# Patient Record
Sex: MALE | Race: WHITE | Employment: OTHER | URBAN - METROPOLITAN AREA
[De-identification: names, ages, dates, MRNs, and addresses within clinical notes are randomized per-mention and may not be internally consistent; named-entity substitution may affect disease eponyms.]

---

## 2017-01-04 ENCOUNTER — TELEPHONE (OUTPATIENT)
Dept: FAMILY MEDICINE CLINIC | Age: 47
End: 2017-01-04

## 2017-01-04 PROBLEM — K80.20 CALCULUS OF GALLBLADDER WITHOUT CHOLECYSTITIS: Status: ACTIVE | Noted: 2017-01-04

## 2017-01-04 PROBLEM — K80.50 BILIARY COLIC: Status: ACTIVE | Noted: 2017-01-04

## 2017-01-09 ENCOUNTER — CARE COORDINATION (OUTPATIENT)
Dept: CARE COORDINATION | Age: 47
End: 2017-01-09

## 2017-02-02 ENCOUNTER — TELEPHONE (OUTPATIENT)
Dept: CARDIOLOGY CLINIC | Age: 47
End: 2017-02-02

## 2017-02-08 ENCOUNTER — TELEPHONE (OUTPATIENT)
Dept: PULMONOLOGY | Age: 47
End: 2017-02-08

## 2017-02-09 ENCOUNTER — TELEPHONE (OUTPATIENT)
Dept: CARDIOLOGY CLINIC | Age: 47
End: 2017-02-09

## 2017-02-10 ENCOUNTER — CARE COORDINATION (OUTPATIENT)
Dept: CARE COORDINATION | Age: 47
End: 2017-02-10

## 2017-02-10 ENCOUNTER — TELEPHONE (OUTPATIENT)
Dept: CARDIAC REHAB | Age: 47
End: 2017-02-10

## 2017-02-14 ENCOUNTER — OFFICE VISIT (OUTPATIENT)
Dept: CARDIOLOGY CLINIC | Age: 47
End: 2017-02-14

## 2017-02-14 VITALS
DIASTOLIC BLOOD PRESSURE: 70 MMHG | WEIGHT: 224.2 LBS | SYSTOLIC BLOOD PRESSURE: 162 MMHG | BODY MASS INDEX: 33.98 KG/M2 | HEIGHT: 68 IN | OXYGEN SATURATION: 97 % | HEART RATE: 79 BPM

## 2017-02-14 DIAGNOSIS — J44.9 CHRONIC OBSTRUCTIVE PULMONARY DISEASE, UNSPECIFIED COPD TYPE (HCC): ICD-10-CM

## 2017-02-14 DIAGNOSIS — Z99.89 OSA ON CPAP: ICD-10-CM

## 2017-02-14 DIAGNOSIS — Q23.1 BICUSPID AORTIC VALVE: ICD-10-CM

## 2017-02-14 DIAGNOSIS — E78.2 MIXED HYPERLIPIDEMIA: ICD-10-CM

## 2017-02-14 DIAGNOSIS — I50.42 CHRONIC COMBINED SYSTOLIC AND DIASTOLIC CONGESTIVE HEART FAILURE (HCC): Primary | ICD-10-CM

## 2017-02-14 DIAGNOSIS — I11.0 HYPERTENSIVE HEART DISEASE WITH CONGESTIVE HEART FAILURE WITH PRESERVED LEFT VENTRICULAR FUNCTION (HCC): ICD-10-CM

## 2017-02-14 DIAGNOSIS — N18.4 CKD (CHRONIC KIDNEY DISEASE), STAGE 4 (SEVERE): ICD-10-CM

## 2017-02-14 DIAGNOSIS — I25.119 CORONARY ARTERY DISEASE INVOLVING NATIVE CORONARY ARTERY OF NATIVE HEART WITH ANGINA PECTORIS (HCC): ICD-10-CM

## 2017-02-14 DIAGNOSIS — I50.30 HYPERTENSIVE HEART DISEASE WITH CONGESTIVE HEART FAILURE WITH PRESERVED LEFT VENTRICULAR FUNCTION (HCC): ICD-10-CM

## 2017-02-14 DIAGNOSIS — G47.33 OSA ON CPAP: ICD-10-CM

## 2017-02-14 PROCEDURE — 99213 OFFICE O/P EST LOW 20 MIN: CPT | Performed by: NURSE PRACTITIONER

## 2017-02-16 ENCOUNTER — TELEPHONE (OUTPATIENT)
Dept: FAMILY MEDICINE CLINIC | Age: 47
End: 2017-02-16

## 2017-02-17 ENCOUNTER — TELEPHONE (OUTPATIENT)
Dept: FAMILY MEDICINE CLINIC | Age: 47
End: 2017-02-17

## 2017-02-17 PROBLEM — I16.0 HYPERTENSIVE URGENCY: Status: ACTIVE | Noted: 2017-02-17

## 2017-02-17 PROBLEM — R07.89 CHEST PRESSURE: Status: ACTIVE | Noted: 2017-02-17

## 2017-02-20 ENCOUNTER — CARE COORDINATION (OUTPATIENT)
Dept: CARE COORDINATION | Age: 47
End: 2017-02-20

## 2017-02-22 RX ORDER — DILTIAZEM HYDROCHLORIDE 180 MG/1
CAPSULE, EXTENDED RELEASE ORAL
Qty: 90 CAPSULE | Refills: 1 | Status: SHIPPED | OUTPATIENT
Start: 2017-02-22 | End: 2017-03-21 | Stop reason: SDUPTHER

## 2017-02-22 RX ORDER — IRBESARTAN 300 MG/1
TABLET ORAL
Qty: 90 TABLET | Refills: 1 | Status: SHIPPED | OUTPATIENT
Start: 2017-02-22 | End: 2017-03-21 | Stop reason: ALTCHOICE

## 2017-02-24 ENCOUNTER — TELEPHONE (OUTPATIENT)
Dept: FAMILY MEDICINE CLINIC | Age: 47
End: 2017-02-24

## 2017-02-27 ENCOUNTER — TELEPHONE (OUTPATIENT)
Dept: FAMILY MEDICINE CLINIC | Age: 47
End: 2017-02-27

## 2017-02-28 ENCOUNTER — TELEPHONE (OUTPATIENT)
Dept: FAMILY MEDICINE CLINIC | Age: 47
End: 2017-02-28

## 2017-03-07 ENCOUNTER — OFFICE VISIT (OUTPATIENT)
Dept: FAMILY MEDICINE CLINIC | Age: 47
End: 2017-03-07

## 2017-03-07 DIAGNOSIS — R19.7 DIARRHEA, UNSPECIFIED TYPE: Primary | ICD-10-CM

## 2017-03-07 DIAGNOSIS — N18.4 CRF (CHRONIC RENAL FAILURE), STAGE 4 (SEVERE) (HCC): ICD-10-CM

## 2017-03-07 PROBLEM — N18.9 CRF (CHRONIC RENAL FAILURE): Status: ACTIVE | Noted: 2017-03-07

## 2017-03-07 PROCEDURE — 99214 OFFICE O/P EST MOD 30 MIN: CPT | Performed by: FAMILY MEDICINE

## 2017-03-07 RX ORDER — HYDRALAZINE HYDROCHLORIDE 100 MG/1
100 TABLET, FILM COATED ORAL 3 TIMES DAILY
Qty: 90 TABLET | Refills: 1 | Status: SHIPPED | OUTPATIENT
Start: 2017-03-07 | End: 2017-03-21 | Stop reason: SDUPTHER

## 2017-03-07 RX ORDER — FERROUS SULFATE 325(65) MG
325 TABLET ORAL
Qty: 90 TABLET | Refills: 1 | Status: SHIPPED | OUTPATIENT
Start: 2017-03-07 | End: 2018-03-05 | Stop reason: SDUPTHER

## 2017-03-07 RX ORDER — FUROSEMIDE 40 MG/1
40 TABLET ORAL DAILY
Qty: 90 TABLET | Refills: 1 | Status: SHIPPED | OUTPATIENT
Start: 2017-03-07 | End: 2017-03-21 | Stop reason: SDUPTHER

## 2017-03-07 RX ORDER — GABAPENTIN 300 MG/1
300 CAPSULE ORAL 3 TIMES DAILY
Qty: 270 CAPSULE | Refills: 1 | Status: SHIPPED | OUTPATIENT
Start: 2017-03-07 | End: 2017-04-26 | Stop reason: SDUPTHER

## 2017-03-09 VITALS
HEART RATE: 104 BPM | HEIGHT: 69 IN | SYSTOLIC BLOOD PRESSURE: 140 MMHG | DIASTOLIC BLOOD PRESSURE: 92 MMHG | WEIGHT: 211 LBS | BODY MASS INDEX: 31.25 KG/M2 | OXYGEN SATURATION: 97 %

## 2017-03-09 ASSESSMENT — ENCOUNTER SYMPTOMS
CONSTIPATION: 0
ABDOMINAL PAIN: 1
SHORTNESS OF BREATH: 0
NAUSEA: 1
VOMITING: 0
DIARRHEA: 1
FLATUS: 1
BLOOD IN STOOL: 0
BLOATING: 0
CHEST TIGHTNESS: 0
ABDOMINAL DISTENTION: 0
COUGH: 0

## 2017-03-15 ENCOUNTER — TELEPHONE (OUTPATIENT)
Dept: FAMILY MEDICINE CLINIC | Age: 47
End: 2017-03-15

## 2017-03-21 ENCOUNTER — OFFICE VISIT (OUTPATIENT)
Dept: CARDIOLOGY CLINIC | Age: 47
End: 2017-03-21

## 2017-03-21 VITALS
HEART RATE: 92 BPM | DIASTOLIC BLOOD PRESSURE: 60 MMHG | WEIGHT: 215 LBS | OXYGEN SATURATION: 96 % | HEIGHT: 69 IN | BODY MASS INDEX: 31.84 KG/M2 | SYSTOLIC BLOOD PRESSURE: 100 MMHG

## 2017-03-21 DIAGNOSIS — I50.42 CHRONIC COMBINED SYSTOLIC AND DIASTOLIC CONGESTIVE HEART FAILURE (HCC): Primary | ICD-10-CM

## 2017-03-21 DIAGNOSIS — N18.4 CKD (CHRONIC KIDNEY DISEASE), STAGE 4 (SEVERE): ICD-10-CM

## 2017-03-21 DIAGNOSIS — J44.9 CHRONIC OBSTRUCTIVE PULMONARY DISEASE, UNSPECIFIED COPD TYPE (HCC): ICD-10-CM

## 2017-03-21 DIAGNOSIS — E78.2 MIXED HYPERLIPIDEMIA: ICD-10-CM

## 2017-03-21 DIAGNOSIS — I11.0 HYPERTENSIVE HEART DISEASE WITH CONGESTIVE HEART FAILURE WITH PRESERVED LEFT VENTRICULAR FUNCTION (HCC): ICD-10-CM

## 2017-03-21 DIAGNOSIS — G47.33 OSA ON CPAP: ICD-10-CM

## 2017-03-21 DIAGNOSIS — Z99.89 OSA ON CPAP: ICD-10-CM

## 2017-03-21 DIAGNOSIS — Q23.1 BICUSPID AORTIC VALVE: ICD-10-CM

## 2017-03-21 DIAGNOSIS — I50.30 HYPERTENSIVE HEART DISEASE WITH CONGESTIVE HEART FAILURE WITH PRESERVED LEFT VENTRICULAR FUNCTION (HCC): ICD-10-CM

## 2017-03-21 DIAGNOSIS — I25.119 CORONARY ARTERY DISEASE INVOLVING NATIVE CORONARY ARTERY OF NATIVE HEART WITH ANGINA PECTORIS (HCC): ICD-10-CM

## 2017-03-21 PROCEDURE — 99214 OFFICE O/P EST MOD 30 MIN: CPT | Performed by: NURSE PRACTITIONER

## 2017-03-21 RX ORDER — HYDRALAZINE HYDROCHLORIDE 100 MG/1
100 TABLET, FILM COATED ORAL 3 TIMES DAILY
Qty: 270 TABLET | Refills: 1 | Status: ON HOLD | OUTPATIENT
Start: 2017-03-21 | End: 2017-05-23 | Stop reason: HOSPADM

## 2017-03-21 RX ORDER — FUROSEMIDE 40 MG/1
40 TABLET ORAL DAILY PRN
Qty: 90 TABLET | Refills: 1 | Status: SHIPPED | OUTPATIENT
Start: 2017-03-21 | End: 2017-04-26 | Stop reason: SDUPTHER

## 2017-03-21 RX ORDER — ISOSORBIDE MONONITRATE 60 MG/1
60 TABLET, EXTENDED RELEASE ORAL DAILY
Qty: 90 TABLET | Refills: 1 | Status: SHIPPED | OUTPATIENT
Start: 2017-03-21 | End: 2017-05-05 | Stop reason: SDUPTHER

## 2017-03-21 RX ORDER — CARVEDILOL 25 MG/1
25 TABLET ORAL 2 TIMES DAILY WITH MEALS
Qty: 180 TABLET | Refills: 1 | Status: SHIPPED | OUTPATIENT
Start: 2017-03-21 | End: 2017-04-18 | Stop reason: SDUPTHER

## 2017-03-21 RX ORDER — DILTIAZEM HYDROCHLORIDE 180 MG/1
180 CAPSULE, COATED, EXTENDED RELEASE ORAL DAILY
Qty: 90 CAPSULE | Refills: 1 | Status: ON HOLD | OUTPATIENT
Start: 2017-03-21 | End: 2017-05-23 | Stop reason: HOSPADM

## 2017-03-24 ENCOUNTER — HOSPITAL ENCOUNTER (OUTPATIENT)
Dept: OTHER | Age: 47
Discharge: HOME | End: 2017-03-24
Attending: INTERNAL MEDICINE | Admitting: INTERNAL MEDICINE

## 2017-03-24 LAB
ALBUMIN SERPL-MCNC: 3.5 G/DL (ref 3.4–5)
ANION GAP SERPL CALCULATED.3IONS-SCNC: 13 MMOL/L (ref 3–16)
BACTERIA: ABNORMAL /HPF
BILIRUBIN URINE: NEGATIVE
BLOOD, URINE: NEGATIVE
BUN BLDV-MCNC: 48 MG/DL (ref 7–20)
CALCIUM SERPL-MCNC: 8.9 MG/DL (ref 8.3–10.6)
CHLORIDE BLD-SCNC: 98 MMOL/L (ref 99–110)
CLARITY: CLEAR
CO2: 25 MMOL/L (ref 21–32)
COLOR: YELLOW
CREAT SERPL-MCNC: 2.8 MG/DL (ref 0.9–1.3)
CREATININE URINE: 64.8 MG/DL (ref 39–259)
GFR AFRICAN AMERICAN: 29
GFR NON-AFRICAN AMERICAN: 24
GLUCOSE BLD-MCNC: 178 MG/DL (ref 70–99)
GLUCOSE URINE: NEGATIVE MG/DL
HCT VFR BLD CALC: 35 % (ref 40.5–52.5)
HEMOGLOBIN: 11.7 G/DL (ref 13.5–17.5)
KETONES, URINE: NEGATIVE MG/DL
LEUKOCYTE ESTERASE, URINE: NEGATIVE
MAGNESIUM: 2.4 MG/DL (ref 1.8–2.4)
MCH RBC QN AUTO: 29.8 PG (ref 26–34)
MCHC RBC AUTO-ENTMCNC: 33.3 G/DL (ref 31–36)
MCV RBC AUTO: 89.5 FL (ref 80–100)
MICROALBUMIN UR-MCNC: 97.1 MG/DL
MICROALBUMIN/CREAT UR-RTO: 1498.5 MG/G (ref 0–30)
MICROSCOPIC EXAMINATION: YES
NITRITE, URINE: NEGATIVE
PARATHYROID HORMONE INTACT: 58.8 PG/ML (ref 14–72)
PDW BLD-RTO: 14.6 % (ref 12.4–15.4)
PH UA: 5.5
PHOSPHORUS: 4 MG/DL (ref 2.5–4.9)
PLATELET # BLD: 325 K/UL (ref 135–450)
PMV BLD AUTO: 7.9 FL (ref 5–10.5)
POTASSIUM SERPL-SCNC: 4.3 MMOL/L (ref 3.5–5.1)
PROTEIN PROTEIN: 136 MG/DL
PROTEIN UA: 100 MG/DL
RBC # BLD: 3.91 M/UL (ref 4.2–5.9)
RBC UA: ABNORMAL /HPF (ref 0–2)
SODIUM BLD-SCNC: 136 MMOL/L (ref 136–145)
SPECIFIC GRAVITY UA: 1.01
URIC ACID, SERUM: 7.3 MG/DL (ref 3.5–7.2)
UROBILINOGEN, URINE: 0.2 E.U./DL
WBC # BLD: 11 K/UL (ref 4–11)
WBC UA: ABNORMAL /HPF (ref 0–5)

## 2017-03-29 RX ORDER — ISOSORBIDE MONONITRATE 30 MG/1
TABLET, EXTENDED RELEASE ORAL
Qty: 90 TABLET | Refills: 1 | Status: SHIPPED | OUTPATIENT
Start: 2017-03-29 | End: 2017-05-05 | Stop reason: SDUPTHER

## 2017-04-11 ENCOUNTER — TELEPHONE (OUTPATIENT)
Dept: FAMILY MEDICINE CLINIC | Age: 47
End: 2017-04-11

## 2017-04-18 RX ORDER — FUROSEMIDE 40 MG/1
TABLET ORAL
Qty: 90 TABLET | Refills: 1 | OUTPATIENT
Start: 2017-04-18

## 2017-04-19 RX ORDER — CARVEDILOL 25 MG/1
TABLET ORAL
Qty: 180 TABLET | Refills: 1 | Status: ON HOLD | OUTPATIENT
Start: 2017-04-19 | End: 2017-11-25 | Stop reason: HOSPADM

## 2017-04-19 RX ORDER — CLOPIDOGREL BISULFATE 75 MG/1
TABLET ORAL
Qty: 90 TABLET | Refills: 1 | Status: SHIPPED | OUTPATIENT
Start: 2017-04-19 | End: 2018-04-25 | Stop reason: SDUPTHER

## 2017-04-19 RX ORDER — TRAZODONE HYDROCHLORIDE 150 MG/1
TABLET ORAL
Qty: 90 TABLET | Refills: 1 | Status: SHIPPED | OUTPATIENT
Start: 2017-04-19 | End: 2017-06-02

## 2017-04-19 RX ORDER — GABAPENTIN 300 MG/1
CAPSULE ORAL
Qty: 270 CAPSULE | Refills: 1 | OUTPATIENT
Start: 2017-04-19

## 2017-04-19 RX ORDER — CITALOPRAM 20 MG/1
TABLET ORAL
Qty: 90 TABLET | Refills: 1 | Status: SHIPPED | OUTPATIENT
Start: 2017-04-19 | End: 2018-10-08 | Stop reason: SDUPTHER

## 2017-04-26 RX ORDER — FUROSEMIDE 40 MG/1
TABLET ORAL
Qty: 90 TABLET | Refills: 1 | Status: SHIPPED | OUTPATIENT
Start: 2017-04-26 | End: 2017-10-17 | Stop reason: ALTCHOICE

## 2017-04-26 RX ORDER — GABAPENTIN 300 MG/1
CAPSULE ORAL
Qty: 270 CAPSULE | Refills: 1 | Status: ON HOLD | OUTPATIENT
Start: 2017-04-26 | End: 2017-05-24 | Stop reason: HOSPADM

## 2017-05-05 ENCOUNTER — HOSPITAL ENCOUNTER (OUTPATIENT)
Dept: OTHER | Age: 47
Discharge: HOME | End: 2017-05-05
Attending: INTERNAL MEDICINE | Admitting: INTERNAL MEDICINE

## 2017-05-05 ENCOUNTER — OFFICE VISIT (OUTPATIENT)
Dept: CARDIOLOGY CLINIC | Age: 47
End: 2017-05-05

## 2017-05-05 VITALS
HEART RATE: 66 BPM | WEIGHT: 221 LBS | OXYGEN SATURATION: 93 % | BODY MASS INDEX: 32.73 KG/M2 | HEIGHT: 69 IN | SYSTOLIC BLOOD PRESSURE: 124 MMHG | DIASTOLIC BLOOD PRESSURE: 60 MMHG

## 2017-05-05 DIAGNOSIS — I50.30 HYPERTENSIVE HEART DISEASE WITH CONGESTIVE HEART FAILURE WITH PRESERVED LEFT VENTRICULAR FUNCTION (HCC): ICD-10-CM

## 2017-05-05 DIAGNOSIS — E78.2 MIXED HYPERLIPIDEMIA: ICD-10-CM

## 2017-05-05 DIAGNOSIS — J44.9 CHRONIC OBSTRUCTIVE PULMONARY DISEASE, UNSPECIFIED COPD TYPE (HCC): ICD-10-CM

## 2017-05-05 DIAGNOSIS — N18.4 CKD (CHRONIC KIDNEY DISEASE), STAGE 4 (SEVERE): ICD-10-CM

## 2017-05-05 DIAGNOSIS — I11.0 HYPERTENSIVE HEART DISEASE WITH CONGESTIVE HEART FAILURE WITH PRESERVED LEFT VENTRICULAR FUNCTION (HCC): ICD-10-CM

## 2017-05-05 DIAGNOSIS — I50.42 CHRONIC COMBINED SYSTOLIC AND DIASTOLIC CONGESTIVE HEART FAILURE (HCC): Primary | ICD-10-CM

## 2017-05-05 DIAGNOSIS — Z99.89 OSA ON CPAP: ICD-10-CM

## 2017-05-05 DIAGNOSIS — Q23.1 BICUSPID AORTIC VALVE: ICD-10-CM

## 2017-05-05 DIAGNOSIS — I25.119 CORONARY ARTERY DISEASE INVOLVING NATIVE CORONARY ARTERY OF NATIVE HEART WITH ANGINA PECTORIS (HCC): ICD-10-CM

## 2017-05-05 DIAGNOSIS — G47.33 OSA ON CPAP: ICD-10-CM

## 2017-05-05 LAB
ALBUMIN SERPL-MCNC: 3.5 G/DL (ref 3.4–5)
ANION GAP SERPL CALCULATED.3IONS-SCNC: 17 MMOL/L (ref 3–16)
BUN BLDV-MCNC: 42 MG/DL (ref 7–20)
CALCIUM SERPL-MCNC: 8.5 MG/DL (ref 8.3–10.6)
CHLORIDE BLD-SCNC: 104 MMOL/L (ref 99–110)
CO2: 18 MMOL/L (ref 21–32)
CREAT SERPL-MCNC: 2.5 MG/DL (ref 0.9–1.3)
GFR AFRICAN AMERICAN: 33
GFR NON-AFRICAN AMERICAN: 28
GLUCOSE BLD-MCNC: 208 MG/DL (ref 70–99)
PARATHYROID HORMONE INTACT: 64.4 PG/ML (ref 14–72)
PHOSPHORUS: 3.6 MG/DL (ref 2.5–4.9)
POTASSIUM SERPL-SCNC: 4.5 MMOL/L (ref 3.5–5.1)
SODIUM BLD-SCNC: 139 MMOL/L (ref 136–145)
VITAMIN D 25-HYDROXY: 9.5 NG/ML

## 2017-05-05 PROCEDURE — 99213 OFFICE O/P EST LOW 20 MIN: CPT | Performed by: NURSE PRACTITIONER

## 2017-05-05 RX ORDER — ISOSORBIDE MONONITRATE 60 MG/1
60 TABLET, EXTENDED RELEASE ORAL DAILY
Qty: 90 TABLET | Refills: 3 | Status: ON HOLD | OUTPATIENT
Start: 2017-05-05 | End: 2017-11-25 | Stop reason: HOSPADM

## 2017-05-21 PROBLEM — I63.9 CVA (CEREBRAL VASCULAR ACCIDENT) (HCC): Status: ACTIVE | Noted: 2017-05-21

## 2017-05-21 PROBLEM — Z72.0 TOBACCO ABUSE: Status: ACTIVE | Noted: 2017-05-21

## 2017-05-21 PROBLEM — E11.22 CKD STAGE 4 DUE TO TYPE 2 DIABETES MELLITUS (HCC): Status: ACTIVE | Noted: 2017-05-21

## 2017-05-21 PROBLEM — N18.4 CKD STAGE 4 DUE TO TYPE 2 DIABETES MELLITUS (HCC): Status: ACTIVE | Noted: 2017-05-21

## 2017-05-25 ENCOUNTER — CARE COORDINATION (OUTPATIENT)
Dept: CARE COORDINATION | Age: 47
End: 2017-05-25

## 2017-05-25 RX ORDER — LANCETS 30 GAUGE
EACH MISCELLANEOUS
Qty: 100 EACH | Refills: 3 | Status: ON HOLD | OUTPATIENT
Start: 2017-05-25 | End: 2021-04-27 | Stop reason: HOSPADM

## 2017-05-25 RX ORDER — BLOOD-GLUCOSE METER
1 KIT MISCELLANEOUS DAILY
Qty: 1 KIT | Refills: 0 | Status: SHIPPED | OUTPATIENT
Start: 2017-05-25 | End: 2019-08-19 | Stop reason: SDUPTHER

## 2017-05-26 ENCOUNTER — TELEPHONE (OUTPATIENT)
Dept: CARDIOLOGY CLINIC | Age: 47
End: 2017-05-26

## 2017-05-26 ENCOUNTER — TELEPHONE (OUTPATIENT)
Dept: FAMILY MEDICINE CLINIC | Age: 47
End: 2017-05-26

## 2017-05-30 ENCOUNTER — TELEPHONE (OUTPATIENT)
Dept: FAMILY MEDICINE CLINIC | Age: 47
End: 2017-05-30

## 2017-05-30 ENCOUNTER — TELEPHONE (OUTPATIENT)
Dept: CARDIOLOGY CLINIC | Age: 47
End: 2017-05-30

## 2017-05-31 ENCOUNTER — TELEPHONE (OUTPATIENT)
Dept: FAMILY MEDICINE CLINIC | Age: 47
End: 2017-05-31

## 2017-06-02 ENCOUNTER — OFFICE VISIT (OUTPATIENT)
Dept: FAMILY MEDICINE CLINIC | Age: 47
End: 2017-06-02

## 2017-06-02 VITALS
SYSTOLIC BLOOD PRESSURE: 136 MMHG | BODY MASS INDEX: 32.49 KG/M2 | OXYGEN SATURATION: 97 % | TEMPERATURE: 98.3 F | DIASTOLIC BLOOD PRESSURE: 72 MMHG | WEIGHT: 220 LBS | HEART RATE: 92 BPM

## 2017-06-02 DIAGNOSIS — Z72.0 TOBACCO ABUSE: Chronic | ICD-10-CM

## 2017-06-02 DIAGNOSIS — J44.9 CHRONIC OBSTRUCTIVE PULMONARY DISEASE, UNSPECIFIED COPD TYPE (HCC): ICD-10-CM

## 2017-06-02 DIAGNOSIS — I20.8 STABLE ANGINA (HCC): ICD-10-CM

## 2017-06-02 DIAGNOSIS — E11.43 DIABETIC AUTONOMIC NEUROPATHY ASSOCIATED WITH TYPE 2 DIABETES MELLITUS (HCC): ICD-10-CM

## 2017-06-02 DIAGNOSIS — E11.22 CKD STAGE 4 DUE TO TYPE 2 DIABETES MELLITUS (HCC): ICD-10-CM

## 2017-06-02 DIAGNOSIS — I42.9 CARDIOMYOPATHY (HCC): ICD-10-CM

## 2017-06-02 DIAGNOSIS — N18.4 CHRONIC KIDNEY DISEASE (CKD), STAGE IV (SEVERE) (HCC): ICD-10-CM

## 2017-06-02 DIAGNOSIS — E11.21 DIABETIC NEPHROPATHY ASSOCIATED WITH TYPE 2 DIABETES MELLITUS (HCC): ICD-10-CM

## 2017-06-02 DIAGNOSIS — N18.4 CKD STAGE 4 DUE TO TYPE 2 DIABETES MELLITUS (HCC): ICD-10-CM

## 2017-06-02 DIAGNOSIS — I63.9 CEREBROVASCULAR ACCIDENT (CVA), UNSPECIFIED MECHANISM (HCC): Primary | ICD-10-CM

## 2017-06-02 DIAGNOSIS — I73.9 PERIPHERAL VASCULAR DISEASE (HCC): ICD-10-CM

## 2017-06-02 PROCEDURE — 99214 OFFICE O/P EST MOD 30 MIN: CPT | Performed by: FAMILY MEDICINE

## 2017-06-02 RX ORDER — HYDROXYZINE PAMOATE 50 MG/1
50-100 CAPSULE ORAL NIGHTLY
Qty: 60 CAPSULE | Refills: 0 | Status: SHIPPED | OUTPATIENT
Start: 2017-06-02 | End: 2017-09-19 | Stop reason: SDUPTHER

## 2017-06-02 RX ORDER — VARENICLINE TARTRATE 0.5 MG/1
0.5 TABLET, FILM COATED ORAL DAILY
Qty: 30 TABLET | Refills: 2 | Status: SHIPPED | OUTPATIENT
Start: 2017-06-02 | End: 2017-07-11 | Stop reason: SDUPTHER

## 2017-06-05 ENCOUNTER — TELEPHONE (OUTPATIENT)
Dept: FAMILY MEDICINE CLINIC | Age: 47
End: 2017-06-05

## 2017-06-05 ASSESSMENT — ENCOUNTER SYMPTOMS
BLOOD IN STOOL: 0
ANAL BLEEDING: 0
ABDOMINAL PAIN: 0
EYE DISCHARGE: 0
CHEST TIGHTNESS: 0
COUGH: 0
DIARRHEA: 0
SHORTNESS OF BREATH: 0
ABDOMINAL DISTENTION: 0
CONSTIPATION: 0

## 2017-06-13 ENCOUNTER — TELEPHONE (OUTPATIENT)
Dept: FAMILY MEDICINE CLINIC | Age: 47
End: 2017-06-13

## 2017-06-15 ENCOUNTER — TELEPHONE (OUTPATIENT)
Dept: FAMILY MEDICINE CLINIC | Age: 47
End: 2017-06-15

## 2017-07-03 PROCEDURE — 93272 ECG/REVIEW INTERPRET ONLY: CPT | Performed by: INTERNAL MEDICINE

## 2017-07-11 ENCOUNTER — OFFICE VISIT (OUTPATIENT)
Dept: CARDIOLOGY CLINIC | Age: 47
End: 2017-07-11

## 2017-07-11 VITALS
HEIGHT: 69 IN | BODY MASS INDEX: 32.73 KG/M2 | OXYGEN SATURATION: 96 % | HEART RATE: 68 BPM | DIASTOLIC BLOOD PRESSURE: 78 MMHG | SYSTOLIC BLOOD PRESSURE: 126 MMHG | WEIGHT: 221 LBS

## 2017-07-11 DIAGNOSIS — Q23.1 BICUSPID AORTIC VALVE: ICD-10-CM

## 2017-07-11 DIAGNOSIS — I63.231 ARTERIAL ISCHEMIC STROKE, ICA, RIGHT, ACUTE (HCC): ICD-10-CM

## 2017-07-11 DIAGNOSIS — G47.33 OSA ON CPAP: ICD-10-CM

## 2017-07-11 DIAGNOSIS — Z99.89 OSA ON CPAP: ICD-10-CM

## 2017-07-11 DIAGNOSIS — I73.9 PVD (PERIPHERAL VASCULAR DISEASE) (HCC): ICD-10-CM

## 2017-07-11 DIAGNOSIS — J44.9 CHRONIC OBSTRUCTIVE PULMONARY DISEASE, UNSPECIFIED COPD TYPE (HCC): ICD-10-CM

## 2017-07-11 DIAGNOSIS — I25.119 CORONARY ARTERY DISEASE INVOLVING NATIVE CORONARY ARTERY OF NATIVE HEART WITH ANGINA PECTORIS (HCC): ICD-10-CM

## 2017-07-11 DIAGNOSIS — I11.0 HYPERTENSIVE HEART DISEASE WITH CONGESTIVE HEART FAILURE WITH PRESERVED LEFT VENTRICULAR FUNCTION (HCC): ICD-10-CM

## 2017-07-11 DIAGNOSIS — I50.30 HYPERTENSIVE HEART DISEASE WITH CONGESTIVE HEART FAILURE WITH PRESERVED LEFT VENTRICULAR FUNCTION (HCC): ICD-10-CM

## 2017-07-11 DIAGNOSIS — I25.5 ISCHEMIC CARDIOMYOPATHY: ICD-10-CM

## 2017-07-11 DIAGNOSIS — E78.2 MIXED HYPERLIPIDEMIA: ICD-10-CM

## 2017-07-11 DIAGNOSIS — I50.42 CHRONIC COMBINED SYSTOLIC AND DIASTOLIC CONGESTIVE HEART FAILURE (HCC): Primary | ICD-10-CM

## 2017-07-11 DIAGNOSIS — N18.4 CKD (CHRONIC KIDNEY DISEASE), STAGE 4 (SEVERE): ICD-10-CM

## 2017-07-11 PROCEDURE — 99214 OFFICE O/P EST MOD 30 MIN: CPT | Performed by: NURSE PRACTITIONER

## 2017-07-11 RX ORDER — VARENICLINE TARTRATE 1 MG/1
1 TABLET, FILM COATED ORAL 2 TIMES DAILY
Qty: 60 TABLET | Refills: 3 | Status: ON HOLD | OUTPATIENT
Start: 2017-08-11 | End: 2017-10-15 | Stop reason: HOSPADM

## 2017-07-11 RX ORDER — VARENICLINE TARTRATE 25 MG
KIT ORAL
Qty: 1 EACH | Refills: 0 | Status: ON HOLD | OUTPATIENT
Start: 2017-07-11 | End: 2017-10-15 | Stop reason: HOSPADM

## 2017-07-13 RX ORDER — SENNA AND DOCUSATE SODIUM 50; 8.6 MG/1; MG/1
TABLET, FILM COATED ORAL
Qty: 30 TABLET | Refills: 0 | Status: ON HOLD | OUTPATIENT
Start: 2017-07-13 | End: 2017-11-22

## 2017-07-18 DIAGNOSIS — I63.211 CEREBROVASCULAR ACCIDENT (CVA) DUE TO OCCLUSION OF RIGHT VERTEBRAL ARTERY (HCC): ICD-10-CM

## 2017-07-31 ENCOUNTER — TELEPHONE (OUTPATIENT)
Dept: CARDIOLOGY CLINIC | Age: 47
End: 2017-07-31

## 2017-08-04 PROBLEM — R19.7 DIARRHEA: Status: ACTIVE | Noted: 2017-08-04

## 2017-08-11 RX ORDER — DILTIAZEM HYDROCHLORIDE 180 MG/1
CAPSULE, EXTENDED RELEASE ORAL
Qty: 90 CAPSULE | Refills: 1 | Status: SHIPPED | OUTPATIENT
Start: 2017-08-11 | End: 2018-04-12 | Stop reason: SDUPTHER

## 2017-08-11 RX ORDER — IRBESARTAN 300 MG/1
TABLET ORAL
Qty: 90 TABLET | Refills: 1 | Status: ON HOLD | OUTPATIENT
Start: 2017-08-11 | End: 2017-10-15 | Stop reason: HOSPADM

## 2017-09-06 RX ORDER — GABAPENTIN 300 MG/1
CAPSULE ORAL
Qty: 270 CAPSULE | Refills: 0 | OUTPATIENT
Start: 2017-09-06

## 2017-09-06 RX ORDER — FUROSEMIDE 40 MG/1
TABLET ORAL
Qty: 90 TABLET | Refills: 0 | OUTPATIENT
Start: 2017-09-06

## 2017-09-06 RX ORDER — TRAZODONE HYDROCHLORIDE 150 MG/1
TABLET ORAL
Qty: 90 TABLET | Refills: 0 | OUTPATIENT
Start: 2017-09-06

## 2017-09-06 RX ORDER — CARVEDILOL 25 MG/1
TABLET ORAL
Qty: 180 TABLET | Refills: 0 | OUTPATIENT
Start: 2017-09-06

## 2017-09-06 RX ORDER — CLOPIDOGREL BISULFATE 75 MG/1
TABLET ORAL
Qty: 90 TABLET | Refills: 0 | OUTPATIENT
Start: 2017-09-06

## 2017-09-06 RX ORDER — CITALOPRAM 20 MG/1
TABLET ORAL
Qty: 90 TABLET | Refills: 0 | OUTPATIENT
Start: 2017-09-06

## 2017-09-11 ENCOUNTER — HOSPITAL ENCOUNTER (OUTPATIENT)
Dept: OTHER | Age: 47
Discharge: HOME | End: 2017-09-11
Attending: INTERNAL MEDICINE | Admitting: INTERNAL MEDICINE

## 2017-09-11 LAB
BACTERIA: ABNORMAL /HPF
BILIRUBIN URINE: NEGATIVE
BLOOD, URINE: ABNORMAL
CLARITY: CLEAR
COLOR: YELLOW
CREATININE URINE: 48.2 MG/DL (ref 39–259)
EPITHELIAL CELLS, UA: ABNORMAL /HPF
GLUCOSE URINE: 250 MG/DL
KETONES, URINE: NEGATIVE MG/DL
LEUKOCYTE ESTERASE, URINE: NEGATIVE
MICROALBUMIN UR-MCNC: 433.7 MG/DL
MICROALBUMIN/CREAT UR-RTO: 8997.9 MG/G (ref 0–30)
MICROSCOPIC EXAMINATION: YES
NITRITE, URINE: NEGATIVE
PH UA: 6
PROTEIN PROTEIN: 630 MG/DL
PROTEIN UA: >=300 MG/DL
RBC UA: ABNORMAL /HPF (ref 0–2)
SPECIFIC GRAVITY UA: 1.02
URINE TYPE: ABNORMAL
UROBILINOGEN, URINE: 0.2 E.U./DL
WBC UA: ABNORMAL /HPF (ref 0–5)

## 2017-09-12 ENCOUNTER — TELEPHONE (OUTPATIENT)
Dept: FAMILY MEDICINE CLINIC | Age: 47
End: 2017-09-12

## 2017-09-12 LAB
ALBUMIN SERPL-MCNC: 3.3 G/DL (ref 3.4–5)
ANION GAP SERPL CALCULATED.3IONS-SCNC: 13 MMOL/L (ref 3–16)
BUN BLDV-MCNC: 33 MG/DL (ref 7–20)
CALCIUM SERPL-MCNC: 9.1 MG/DL (ref 8.3–10.6)
CHLORIDE BLD-SCNC: 102 MMOL/L (ref 99–110)
CO2: 22 MMOL/L (ref 21–32)
CREAT SERPL-MCNC: 2.5 MG/DL (ref 0.9–1.3)
GFR AFRICAN AMERICAN: 33
GFR NON-AFRICAN AMERICAN: 28
GLUCOSE BLD-MCNC: 192 MG/DL (ref 70–99)
PHOSPHORUS: 4.2 MG/DL (ref 2.5–4.9)
POTASSIUM SERPL-SCNC: 4.6 MMOL/L (ref 3.5–5.1)
SODIUM BLD-SCNC: 137 MMOL/L (ref 136–145)

## 2017-09-19 RX ORDER — HYDROXYZINE PAMOATE 50 MG/1
CAPSULE ORAL
Qty: 60 CAPSULE | Refills: 0 | Status: SHIPPED | OUTPATIENT
Start: 2017-09-19 | End: 2017-10-18 | Stop reason: SDUPTHER

## 2017-10-11 ENCOUNTER — TELEPHONE (OUTPATIENT)
Dept: FAMILY MEDICINE CLINIC | Age: 47
End: 2017-10-11

## 2017-10-11 NOTE — TELEPHONE ENCOUNTER
Patient called and states that he has been trouble breathing for the last 3 days and he has gained 3 pounds yesterday and today. I let Dr. Sol Hampton know what was going on and he wants the patient to go to the ER. Patient is aware of this.

## 2017-10-14 PROBLEM — N18.5 CHRONIC RENAL FAILURE, STAGE 5 (HCC): Status: ACTIVE | Noted: 2017-03-07

## 2017-10-17 ENCOUNTER — OFFICE VISIT (OUTPATIENT)
Dept: CARDIOLOGY CLINIC | Age: 47
End: 2017-10-17

## 2017-10-17 VITALS
DIASTOLIC BLOOD PRESSURE: 64 MMHG | SYSTOLIC BLOOD PRESSURE: 110 MMHG | HEART RATE: 78 BPM | BODY MASS INDEX: 35.98 KG/M2 | OXYGEN SATURATION: 95 % | WEIGHT: 237.4 LBS | HEIGHT: 68 IN

## 2017-10-17 DIAGNOSIS — J44.9 CHRONIC OBSTRUCTIVE PULMONARY DISEASE, UNSPECIFIED COPD TYPE (HCC): ICD-10-CM

## 2017-10-17 DIAGNOSIS — I25.119 CORONARY ARTERY DISEASE INVOLVING NATIVE CORONARY ARTERY OF NATIVE HEART WITH ANGINA PECTORIS (HCC): ICD-10-CM

## 2017-10-17 DIAGNOSIS — Q23.1 BICUSPID AORTIC VALVE: ICD-10-CM

## 2017-10-17 DIAGNOSIS — I50.42 CHRONIC COMBINED SYSTOLIC AND DIASTOLIC CONGESTIVE HEART FAILURE (HCC): Primary | ICD-10-CM

## 2017-10-17 DIAGNOSIS — Z99.89 OSA ON CPAP: ICD-10-CM

## 2017-10-17 DIAGNOSIS — I11.0 HYPERTENSIVE HEART DISEASE WITH CONGESTIVE HEART FAILURE WITH PRESERVED LEFT VENTRICULAR FUNCTION (HCC): ICD-10-CM

## 2017-10-17 DIAGNOSIS — I63.231 ARTERIAL ISCHEMIC STROKE, ICA, RIGHT, ACUTE (HCC): ICD-10-CM

## 2017-10-17 DIAGNOSIS — N18.5 CHRONIC RENAL FAILURE, STAGE 5 (HCC): ICD-10-CM

## 2017-10-17 DIAGNOSIS — I25.5 ISCHEMIC CARDIOMYOPATHY: ICD-10-CM

## 2017-10-17 DIAGNOSIS — E78.2 MIXED HYPERLIPIDEMIA: ICD-10-CM

## 2017-10-17 DIAGNOSIS — G47.33 OSA ON CPAP: ICD-10-CM

## 2017-10-17 DIAGNOSIS — I73.9 PVD (PERIPHERAL VASCULAR DISEASE) (HCC): ICD-10-CM

## 2017-10-17 DIAGNOSIS — I50.30 HYPERTENSIVE HEART DISEASE WITH CONGESTIVE HEART FAILURE WITH PRESERVED LEFT VENTRICULAR FUNCTION (HCC): ICD-10-CM

## 2017-10-17 PROCEDURE — 1036F TOBACCO NON-USER: CPT | Performed by: NURSE PRACTITIONER

## 2017-10-17 PROCEDURE — G8598 ASA/ANTIPLAT THER USED: HCPCS | Performed by: NURSE PRACTITIONER

## 2017-10-17 PROCEDURE — 3023F SPIROM DOC REV: CPT | Performed by: NURSE PRACTITIONER

## 2017-10-17 PROCEDURE — 99214 OFFICE O/P EST MOD 30 MIN: CPT | Performed by: NURSE PRACTITIONER

## 2017-10-17 PROCEDURE — G8484 FLU IMMUNIZE NO ADMIN: HCPCS | Performed by: NURSE PRACTITIONER

## 2017-10-17 PROCEDURE — G8427 DOCREV CUR MEDS BY ELIG CLIN: HCPCS | Performed by: NURSE PRACTITIONER

## 2017-10-17 PROCEDURE — 1111F DSCHRG MED/CURRENT MED MERGE: CPT | Performed by: NURSE PRACTITIONER

## 2017-10-17 PROCEDURE — 3046F HEMOGLOBIN A1C LEVEL >9.0%: CPT | Performed by: NURSE PRACTITIONER

## 2017-10-17 PROCEDURE — G8926 SPIRO NO PERF OR DOC: HCPCS | Performed by: NURSE PRACTITIONER

## 2017-10-17 PROCEDURE — G8417 CALC BMI ABV UP PARAM F/U: HCPCS | Performed by: NURSE PRACTITIONER

## 2017-10-17 RX ORDER — TORSEMIDE 20 MG/1
20 TABLET ORAL DAILY
Qty: 30 TABLET | Refills: 3 | Status: SHIPPED | OUTPATIENT
Start: 2017-10-17 | End: 2017-10-17 | Stop reason: SDUPTHER

## 2017-10-17 RX ORDER — GABAPENTIN 300 MG/1
300 CAPSULE ORAL 3 TIMES DAILY
Status: ON HOLD | COMMUNITY
End: 2017-11-25 | Stop reason: HOSPADM

## 2017-10-17 RX ORDER — TORSEMIDE 20 MG/1
20 TABLET ORAL DAILY
Qty: 90 TABLET | Refills: 3 | Status: ON HOLD | OUTPATIENT
Start: 2017-10-17 | End: 2017-11-08 | Stop reason: HOSPADM

## 2017-10-17 NOTE — PATIENT INSTRUCTIONS
1. Stop the sodium bicarbonate completely  2. Stop the furosemide (Lasix)  3. Start instead torsemide (Demadex) 20 mg once daily in the AM  4. Make sure you are not taking \"sartan\" - irbesartan or losartan or olmesartan  5.  Follow up with me in 2-3 weeks

## 2017-10-17 NOTE — PROGRESS NOTES
insulin glargine (TOUJEO SOLOSTAR) 300 UNIT/ML injection pen Inject 40 Units into the skin nightly 6/2/17  Yes Soren Mcallister MD   Insulin Syringes, Disposable, U-100 1 ML MISC 1 each by Does not apply route daily 6/2/17  Yes Soren Mcallister MD   glucose blood VI test strips (FREESTYLE LITE) strip Daily As needed. 5/25/17  Yes Soren Mcallister MD   glucose monitoring kit (FREESTYLE) monitoring kit 1 kit by Does not apply route daily 5/25/17  Yes Soren Mcallister MD   Lancets MISC Test daily 5/25/17  Yes Soren Mcallister MD   atorvastatin (LIPITOR) 80 MG tablet Take 1 tablet by mouth daily 5/24/17  Yes Rock Cornejo MD   isosorbide mononitrate (IMDUR) 60 MG extended release tablet Take 1 tablet by mouth daily 5/5/17  Yes Antelmo Murphy NP   furosemide (LASIX) 40 MG tablet TAKE 1 TABLET EVERY DAY 4/26/17  Yes Soren Mcallister MD   clopidogrel (PLAVIX) 75 MG tablet TAKE 1 TABLET EVERY DAY 4/19/17  Yes Soren Mcallister MD   citalopram (CELEXA) 20 MG tablet TAKE 1 TABLET EVERY DAY 4/19/17  Yes Soren Mcallister MD   carvedilol (COREG) 25 MG tablet TAKE 1 TABLET TWICE DAILY WITH MEALS 4/19/17  Yes Soren Mcallister MD   cloNIDine (CATAPRES) 0.1 MG/24HR Place 1 patch onto the skin once a week 3/21/17  Yes Antelmo Murphy NP   ferrous sulfate 325 (65 FE) MG tablet Take 1 tablet by mouth daily (with breakfast) 3/7/17  Yes Soren Mcallister MD   fluticasone (FLONASE) 50 MCG/ACT nasal spray 4 sprays by Nasal route daily (2 SPRAYS UP EACH NOSTRIL)  Patient taking differently: 4 sprays by Nasal route as needed (2 SPRAYS UP EACH NOSTRIL) 10/14/16  Yes Soren Mcallister MD   nitroGLYCERIN (NITROSTAT) 0.4 MG SL tablet Place 1 tablet under the tongue every 5 minutes as needed for Chest pain 5/27/15  Yes Thea Beck MD   calcium carbonate (TUMS) 500 MG chewable tablet Take 1 tablet by mouth 3 times daily as needed for Heartburn.    Yes Historical Provider, MD   aspirin 81 MG chewable tablet Take 1 tablet by mouth daily. 5/14/13  Yes Teodora Delcid MD   insulin lispro (HUMALOG) 100 UNIT/ML injection vial **Low Dose Correction Algorithm**  Glucose:  Dose:     No Insulin  140-199  1 Unit  200-249  2 Units  250-299  3 Units  300-349  4 Units  350-399  5 Units  400 and above 6 Units 5/24/17   Jann Gardner MD   lidocaine (LIDODERM) 5 % Place 1 patch onto the skin daily as needed for Pain 12 hours on, 12 hours off. 5/24/17   Con MD Kendra        Allergies:  Review of patient's allergies indicates no known allergies. Review of Systems:   · Constitutional: there has been no unanticipated weight loss. · Eyes: No vision changes  · ENT: No Headaches, no nasal congestion. No mouth sores or sore throat. · Cardiovascular: Reviewed in HPI  · Respiratory: No cough or wheezing, no sputum production. · Gastrointestinal: No abdominal pain, no constipation or diarrhea  · Genitourinary: No dysuria, trouble voiding, or hematuria. · Musculoskeletal:  No weakness or joint complaints. · Integumentary: No rash or pruritis. · Neurological: No numbness or tingling. No weakness. No tremor. · Psychiatric: No anxiety, no depression. · Endocrine:  No excessive thirst or urination. · Hematologic/Lymphatic: No abnormal bruising or bleeding, blood clots or swollen lymph nodes.       Physical Examination:    Vitals:    10/17/17 1500   BP: 110/64   Pulse: 78   SpO2: 95%   Weight: 237 lb 6.4 oz (107.7 kg)   Height: 5' 8\" (1.727 m)        Constitutional and General Appearance: Warm and dry, no apparent distress, normal coloration  HEENT:  Normocephalic, atraumatic  Respiratory:  · Normal excursion and expansion without use of accessory muscles  · Resp Auscultation: Normal breath sounds without dullness  Cardiovascular:  · The apical impulses not displaced  · Heart tones are crisp and normal  · JVP 10 cm H2O  · Regular rate and rhythm, normal s1S2, 3/6 CAROL, no g/r/c  · Peripheral pulses are symmetrical and full  · There is no clubbing, cyanosis of the extremities.   · 1+ edema, R>L  · Pedal Pulses: 2+ and equal     Abdomen:  · No masses or tenderness  · Liver/Spleen: No Abnormalities Noted  Neurological/Psychiatric:  · Alert and oriented in all spheres  · Moves all extremities well  · Exhibits normal gait balance and coordination  · No abnormalities of mood, affect, memory, mentation, or behavior are noted    Lab Data:  CBC:   Lab Results   Component Value Date    WBC 12.4 10/14/2017    WBC 8.3 10/13/2017    WBC 12.2 10/12/2017    RBC 3.29 10/14/2017    RBC 3.22 10/13/2017    RBC 3.37 10/12/2017    HGB 10.2 10/14/2017    HGB 10.1 10/13/2017    HGB 10.3 10/12/2017    HCT 29.7 10/14/2017    HCT 29.5 10/13/2017    HCT 30.8 10/12/2017    MCV 90.3 10/14/2017    MCV 91.5 10/13/2017    MCV 91.3 10/12/2017    RDW 13.5 10/14/2017    RDW 13.8 10/13/2017    RDW 13.5 10/12/2017     10/14/2017     10/13/2017     10/12/2017     BMP:  Lab Results   Component Value Date     10/15/2017     10/14/2017     10/13/2017    K 5.6 10/15/2017    K 4.9 10/14/2017    K 5.0 10/13/2017    CL 93 10/15/2017    CL 97 10/14/2017    CL 99 10/13/2017    CO2 22 10/15/2017    CO2 21 10/14/2017    CO2 18 10/13/2017    PHOS 5.2 10/14/2017    PHOS 6.2 10/13/2017    PHOS 4.6 10/12/2017    BUN 53 10/15/2017    BUN 57 10/14/2017    BUN 55 10/13/2017    CREATININE 3.0 10/15/2017    CREATININE 3.1 10/14/2017    CREATININE 3.1 10/13/2017     BNP:   Lab Results   Component Value Date    PROBNP 22,037 10/11/2017    PROBNP 8,232 02/18/2017    PROBNP 16,037 02/17/2017     Troponin: No components found for: TROPONIN  Lipids:   Lab Results   Component Value Date    TRIG 107 05/22/2017    TRIG 118 01/22/2017    HDL 36 05/22/2017    HDL 40 01/22/2017    LDLCALC 72 05/22/2017    LDLCALC 130 01/22/2017    LDLDIRECT 199 09/04/2014    LDLDIRECT 172 03/07/2014     Cardiac Imaging:  Echo 1/24/2017:  Summary   Left ventricular systolic function is normal with an ejection fraction of 55-60%. No wall motion abnormalities noted. Mild concentric left ventricular hypertrophy. Grade II diastolic dysfunction with elevated filling pressure. Mildly dilated left atrium. Moderate aortic stenosis. Mild aortic regurgitation. Compared to previous study from 10/27/2016, aortic stenosis has not   progressed. R/LHC 1/23/2017:  LM <20%  LAD 30% w/ patent stent  Cx 20-30%  RCA 20-30%  LVEDP 19  RA 8, RV 41/10, PA 45/14/31, W 15      Nonobstructive CAD  Mild elevated right heart pressures      Echo Oct 2016:   Normal left ventricle size with mild concentric left ventricular hypertrophy.   Normal systolic function with an estimated ejection fraction of 55%.   No regional wall motion abnormalities are seen.   Elevated left ventricular diastolic filling pressure ( E/e' 21 ).  The aortic valve is thickened/calcified with decreased leaflet mobility. Cannot exclude a bicuspid valve.  The aortic valve area is calculated at 1.0 cm2 with a max velocity of 3.36 m/sec, maximum pressure gradient of 45 mmHg and a mean pressure gradient of 23 mmHg. This is c/w moderate aortic stenosis. Color flow demonstrates eccentric jet suggesting mild aortic regurgitation.   Trace mitral and tricuspid regurgitation. Systolic pulmonary artery pressure (SPAP) is normal and estimated at 22 mmHg (RA pressure 3 mmHg).   There is mild concentric left ventricular hypertrophy. MPI 6/18/15: There is a very small and mild fixed posterior-basal defect consistent with  fibrosis. There is no evidence for ischemia. Left ventricular function is reduced with and estimated LVEF of 40%. The LV is severely dilated. CATH: 5/26/15, Dr. Fili Hameed  LM <20%   LAD 70% mid.  FFR 0.77  D1 50% prox  Cx 20%  RCA 20%  LVEDP 22mmHg  RA 9, RV 42/10, PA 44/16/31, W 18  PA sat 63%    PTCA LAD  3.0 x 15 PATRICIA  prox portion post 3.5 x 8 NC balloon    DAPT, statin  Resume lasix and ARB at discharge provided Cr stable  Renal fxn will not tolerate higher dose of lasix than 40 daily  Needs smoking cessation, weight loss, exercise  Rec OP sleep eval       Echo 3/26/15:   Left ventricle size is normal.  Mild c LVH. EF 55 % to 60 %. No regional wall motion abnormalities are noted. Normal diastolic filling pattern for age. Mild mitral regurgitation is present. Mildly dilated left atrium by increased left atrial volume. Aortic valve is mildly thickened and calcified. The aortic valve area is calculated at 1.4 cm2 with a maximum gradient of 32 mmHg and a mean gradient of 18 mmHg. This is c/w mild aortic stenosis. Trace aortic regurgitation. Trivial tricuspid regurgitation. Systolic pulmonary artery pressure (SPAP) is normal and estimated at 14 mmHg (RA pressure 3 mm Hg). Echo 09/05/2014:    Normal left ventricle size with mild concentric left ventricular hypertrophy. Normal systolic function with an estimated ejection fraction of 55%. No regional wall motion abnormalities are seen. Diastolic filling parameters suggests grade I diastolic dysfunction. Mitral annular calcification is present. The right coronary cusp of the aortic valve appears appears calcified and thickened with decreased leaflet mobility. A bicuspid aortic valve cannot be excluded. The maximal transaortic velocity is 2.48m/s which gives peak pressure gradient= 25mmHg and mean pressure gradient= 14mmHg which is c/w mild aortic stenosis. Color flow demonstrates eccentric jet suggesting mild aorticregurgitation. Cardiac cath May 2013:  1. Mild non-obstructive CAD  2. Reduced LVEF at 40%  3. Mildly elevated LVEDP at 19    MPI Sept 2013: No definite or large zone of reversible ischemia. Small inferoapical defect on stress and rest may represent normal apical thinning or minimal fibrosis. Assessment:    1. Chronic combined systolic and diastolic congestive heart failure ([address]    2.  Coronary artery disease involving native coronary artery

## 2017-10-19 RX ORDER — HYDROXYZINE PAMOATE 50 MG/1
CAPSULE ORAL
Qty: 60 CAPSULE | Refills: 2 | Status: SHIPPED | OUTPATIENT
Start: 2017-10-19 | End: 2018-03-05 | Stop reason: SDUPTHER

## 2017-10-26 RX ORDER — GABAPENTIN 300 MG/1
CAPSULE ORAL
Qty: 270 CAPSULE | Refills: 1 | Status: SHIPPED | OUTPATIENT
Start: 2017-10-26 | End: 2017-11-08 | Stop reason: HOSPADM

## 2017-11-06 RX ORDER — ALBUTEROL SULFATE 90 UG/1
2 AEROSOL, METERED RESPIRATORY (INHALATION) EVERY 6 HOURS PRN
Qty: 1 INHALER | Refills: 5 | Status: ON HOLD | OUTPATIENT
Start: 2017-11-06 | End: 2018-03-24 | Stop reason: HOSPADM

## 2017-11-09 ENCOUNTER — TELEPHONE (OUTPATIENT)
Dept: CARDIAC REHAB | Age: 47
End: 2017-11-09

## 2017-11-10 ENCOUNTER — TELEPHONE (OUTPATIENT)
Dept: PULMONOLOGY | Age: 47
End: 2017-11-10

## 2017-11-10 NOTE — TELEPHONE ENCOUNTER
----- Message from Johnna Aguirre MD sent at 11/8/2017  1:03 PM EST -----  Follow up with me in 1 month.  DIGNA    AN

## 2017-11-13 NOTE — TELEPHONE ENCOUNTER
Can we reschedule Brady Holman from Tuesday to Monday 11/20/17 at 1:45? He has dialysis on Tuesday, Thursday and Saturday now.     Thanks, [employer]

## 2017-11-22 PROBLEM — Z99.2 ESRD (END STAGE RENAL DISEASE) ON DIALYSIS (HCC): Status: ACTIVE | Noted: 2017-11-22

## 2017-11-22 PROBLEM — M79.602 LEFT ARM PAIN: Status: ACTIVE | Noted: 2017-11-22

## 2017-11-22 PROBLEM — R42 DIZZINESS: Status: ACTIVE | Noted: 2017-11-22

## 2017-11-22 PROBLEM — I95.3 HEMODIALYSIS-ASSOCIATED HYPOTENSION: Status: ACTIVE | Noted: 2017-11-22

## 2017-11-22 PROBLEM — N18.6 ESRD (END STAGE RENAL DISEASE) ON DIALYSIS (HCC): Status: ACTIVE | Noted: 2017-11-22

## 2017-11-25 PROBLEM — I95.2 HYPOTENSION DUE TO DRUGS: Status: ACTIVE | Noted: 2017-11-25

## 2017-11-27 ENCOUNTER — TELEPHONE (OUTPATIENT)
Dept: MEDSURG UNIT | Age: 47
End: 2017-11-27

## 2017-11-27 NOTE — TELEPHONE ENCOUNTER
[address]    SYMPTOM ASSESSMENT: Post discharge follow-up phone call made to patient. Patient denies shortness of breath, chest pain, edema, or difficulty sleeping; stated he has been feeling \"okay\" since discharge. Patient's weight on his home scale immediately following discharge was 221 lbs; patient states his weight yesterday was 222 lbs; patient states he has not yet weighed himself today. Reviewed daily weights and call parameters; encouraged patient to call his nephrologist if his weight continues to increase. Patient states he has had significant urine output since discharge. Denies any acute complaints at the time of the call. MEDICATION REVIEW: Patient was able to fill all prescriptions and states he has been taking medications as prescribed. Reviewed patient medications; no discrepancies identified. FOLLOW-UP APPOINTMENT: Reminded patient of his appointment with Soren Mcallister MD scheduled for tomorrow, November 28 at 10:40 AM. Patient states he is \"working on transportation\" to his appointment. EDUCATION: Educated patient on sodium restriction of < 2,000 mg and fluid restriction of < 2,000 ml, daily weights, follow-up, and medication compliance. Stressed the importance of strictly following sodium and fluid restriction especially now since patient is no longer on dialysis. Patient states he has been \"going out of my way\" to avoid sodium, no longer eats processed foods and is avoiding restaurant meals. Patient states he has been preparing food based on the [address]. Reviewed recommended level of activity. Notified patient to call the doctor post discharge if he experiences shortness of breath, chest pain, swelling, cough, or weight gain or loss of 2-3 pounds in a day/five pounds in a week. Also notified patient to call the doctor if he feels dizzy, increased fatigue, decreased or difficulty urinating.     Pt verbalized understanding; stated he will call the doctor with any questions or signs of symptom worsening. No additional questions at this time. HF resource number made available for non-urgent questions.

## 2017-12-01 ENCOUNTER — OFFICE VISIT (OUTPATIENT)
Dept: FAMILY MEDICINE CLINIC | Age: 47
End: 2017-12-01

## 2017-12-01 VITALS
OXYGEN SATURATION: 98 % | HEART RATE: 84 BPM | BODY MASS INDEX: 32.05 KG/M2 | SYSTOLIC BLOOD PRESSURE: 120 MMHG | DIASTOLIC BLOOD PRESSURE: 76 MMHG | WEIGHT: 217 LBS

## 2017-12-01 DIAGNOSIS — E86.1 HYPOVOLEMIA: ICD-10-CM

## 2017-12-01 DIAGNOSIS — N18.6 END STAGE RENAL DISEASE (HCC): Primary | ICD-10-CM

## 2017-12-01 DIAGNOSIS — R55 SYNCOPE, UNSPECIFIED SYNCOPE TYPE: ICD-10-CM

## 2017-12-01 PROCEDURE — 1111F DSCHRG MED/CURRENT MED MERGE: CPT | Performed by: FAMILY MEDICINE

## 2017-12-01 PROCEDURE — 99496 TRANSJ CARE MGMT HIGH F2F 7D: CPT | Performed by: FAMILY MEDICINE

## 2017-12-01 RX ORDER — ZOLPIDEM TARTRATE 5 MG/1
5 TABLET ORAL NIGHTLY PRN
Qty: 30 TABLET | Refills: 0 | Status: SHIPPED | OUTPATIENT
Start: 2017-12-01 | End: 2018-03-05

## 2017-12-03 NOTE — PROGRESS NOTES
Post-Discharge Transitional Care Management Services      Michael Moralez   YOB: 1970    Date of Office Visit:  12/1/2017  Date of Hospital Admission: 11/24/17  Date of Hospital Discharge: 11/25/17  [facility] Risk Score [risk of hospital readmission >=10  medium risk (chance of readmission ~ 12%) >14  high risk (chance of readmission ~18%)]:Risk Score: 23.75    Care management risk score Rising risk (score 2-5) and Complex Care (Scores >=6): No Risk Score On File       Patient Active Problem List   Diagnosis    Acute respiratory failure with hypoxia and hypercapnia (HCC)    Chronic dCHF (grade 2 LVDD)    Chronic obstructive pulmonary disease ([address]    PVD (peripheral vascular disease) ([address]    Cardiomyopathy ([address]    Sleep apnea    Bicuspid aortic valve    Bilateral hilar adenopathy syndrome    Claudication in peripheral vascular disease ([address]    PVD (peripheral vascular disease) ([address]    Essential hypertension    Diabetic neuropathy (HCC)    Type 2 diabetes, uncontrolled, with neuropathy ([address]    Passive smoke exposure    Depression with anxiety    Pneumonia of right upper lobe due to infectious organism ([address]    DM (diabetes mellitus) ([address]    Hypertensive heart disease with congestive heart failure with preserved left ventricular function ([address]    CHF exacerbation ([address]    Chest pain    Coronary artery disease involving native coronary artery of native heart without angina pectoris    Obesity (BMI 30-39. 9)    ZAINAB on CPAP    Degeneration of lumbar or lumbosacral intervertebral disc    Lumbar radiculopathy    Lumbosacral spondylosis without myelopathy    Biliary colic    Symptomatic cholelithiasis    Pre-operative cardiovascular examination    Gastroparesis due to DM (HCC)    Elevated troponin    Angina, class IV (HCC)    Dyspnea    Elevated brain natriuretic peptide (BNP) level    Dyslipidemia    Non morbid obesity due to excess calories    Tobacco smoking 75 MG tablet  TAKE 1 TABLET EVERY DAY             ferrous sulfate 325 (65 FE) MG tablet  Take 1 tablet by mouth daily (with breakfast)             fluticasone (FLONASE) 50 MCG/ACT nasal spray  4 sprays by Nasal route daily (2 SPRAYS UP EACH NOSTRIL)             gabapentin (NEURONTIN) 100 MG capsule  Take 2 capsules by mouth 3 times daily             glucose blood VI test strips (FREESTYLE LITE) strip  Daily As needed. glucose monitoring kit (FREESTYLE) monitoring kit  1 kit by Does not apply route daily             HYDROcodone-acetaminophen (NORCO)  MG per tablet  TK 1 T PO  Q 8 H PRN             hydrOXYzine (VISTARIL) 50 MG capsule  TAKE 1 TO 2 CAPSULES BY MOUTH EVERY NIGHT             Insulin Syringes, Disposable, U-100 1 ML MISC  1 each by Does not apply route daily             ipratropium-albuterol (DUONEB) 0.5-2.5 (3) MG/3ML SOLN nebulizer solution  Inhale 3 mLs into the lungs every 6 hours as needed for Shortness of Breath             isosorbide mononitrate (IMDUR) 30 MG extended release tablet  Take 1 tablet by mouth daily             Lancets MISC  Test daily             nitroGLYCERIN (NITROSTAT) 0.4 MG SL tablet  Place 1 tablet under the tongue every 5 minutes as needed for Chest pain             torsemide (DEMADEX) 100 MG tablet  Take 1 tablet by mouth daily             traZODone (DESYREL) 150 MG tablet  Take 150 mg by mouth nightly             zolpidem (AMBIEN) 5 MG tablet  Take 1 tablet by mouth nightly as needed for Sleep . Medications marked \"taking\" at this time  Outpatient Prescriptions Marked as Taking for the 12/1/17 encounter (Office Visit) with Jaylyn Garner MD   Medication Sig Dispense Refill    zolpidem (AMBIEN) 5 MG tablet Take 1 tablet by mouth nightly as needed for Sleep .  30 tablet 0    isosorbide mononitrate (IMDUR) 30 MG extended release tablet Take 1 tablet by mouth daily 30 tablet 3    gabapentin (NEURONTIN) 100 MG capsule Take 2 capsules by mouth 3 times daily 90 capsule 3    carvedilol (COREG) 12.5 MG tablet Take 1 tablet by mouth 2 times daily (with meals) 60 tablet 3    HYDROcodone-acetaminophen (NORCO)  MG per tablet TK 1 T PO  Q 8 H PRN  0    torsemide (DEMADEX) 100 MG tablet Take 1 tablet by mouth daily 30 tablet 3    hydrOXYzine (VISTARIL) 50 MG capsule TAKE 1 TO 2 CAPSULES BY MOUTH EVERY NIGHT 60 capsule 2    ipratropium-albuterol (DUONEB) 0.5-2.5 (3) MG/3ML SOLN nebulizer solution Inhale 3 mLs into the lungs every 6 hours as needed for Shortness of Breath 360 mL 1    traZODone (DESYREL) 150 MG tablet Take 150 mg by mouth nightly      CARTIA  MG extended release capsule TAKE 1 CAPSULE EVERY DAY 90 capsule 1    Insulin Syringes, Disposable, U-100 1 ML MISC 1 each by Does not apply route daily 100 each 0    glucose blood VI test strips (FREESTYLE LITE) strip Daily As needed. 100 strip 3    glucose monitoring kit (FREESTYLE) monitoring kit 1 kit by Does not apply route daily 1 kit 0    Lancets MISC Test daily 100 each 3    atorvastatin (LIPITOR) 80 MG tablet Take 1 tablet by mouth daily 30 tablet 3    clopidogrel (PLAVIX) 75 MG tablet TAKE 1 TABLET EVERY DAY 90 tablet 1    citalopram (CELEXA) 20 MG tablet TAKE 1 TABLET EVERY DAY 90 tablet 1    ferrous sulfate 325 (65 FE) MG tablet Take 1 tablet by mouth daily (with breakfast) 90 tablet 1    fluticasone (FLONASE) 50 MCG/ACT nasal spray 4 sprays by Nasal route daily (2 SPRAYS UP EACH NOSTRIL) (Patient taking differently: 4 sprays by Nasal route as needed (2 SPRAYS UP EACH NOSTRIL)) 1 Bottle 3    nitroGLYCERIN (NITROSTAT) 0.4 MG SL tablet Place 1 tablet under the tongue every 5 minutes as needed for Chest pain 25 tablet 0    calcium carbonate (TUMS) 500 MG chewable tablet Take 1 tablet by mouth 3 times daily as needed for Heartburn.  aspirin 81 MG chewable tablet Take 1 tablet by mouth daily.  30 tablet 2        Medications patient taking as of now reconciled against medications ordered at time of hospital discharge     Vitals:    12/01/17 1533   BP: 120/76   Site: Right Arm   Position: Sitting   Pulse: 84   SpO2: 98%   Weight: 217 lb (98.4 kg)     Body mass index is 32.05 kg/m². Wt Readings from Last 3 Encounters:   12/01/17 217 lb (98.4 kg)   11/25/17 218 lb 9.6 oz (99.2 kg)   11/08/17 235 lb 3.7 oz (106.7 kg)     BP Readings from Last 3 Encounters:   12/01/17 120/76   11/25/17 137/65   11/08/17 120/64        Inpatient course: Discharge summary reviewed- see chart. Chief Complaint   Patient [place]   [address] [place] [facility]     Seen 11-24-17. HPI  Review of Systems    Non face to face  following discharge, date last encounter closed (first attempt may have been earlier): *No documented post hospital discharge outreach found in the last 14 days *No documented post hospital discharge outreach found in the last 14 days   There is a phone call from of [facility] nurse documenting this. See note on 11-27-17    Call initiated 2 business days of discharge: Yes    Interval history/Current status: Improved      Physical Exam        Assessment/Plan:  Karlos Nieto was seen today for follow-up from hospital.    Diagnoses and all orders for this visit:    End stage renal disease ([facility]  -     MI DISCHARGE MEDS RECONCILED W/ CURRENT OUTPATIENT MED LIST    Hypovolemia  -     MI DISCHARGE MEDS RECONCILED W/ CURRENT OUTPATIENT MED LIST    Syncope, unspecified syncope type  -     MI DISCHARGE MEDS RECONCILED W/ CURRENT OUTPATIENT MED LIST    Other orders  -     zolpidem (AMBIEN) 5 MG tablet; Take 1 tablet by mouth nightly as needed for Sleep . The patient has had multiple episodes of syncope after hemodialysis. Dr. Rubio is holding his dialysis for now. He is getting follow-up blood work on Monday. His creatinine has been holding stable at 4. Patient is still urinating on his own.     Medical Decision Making: high complexity

## 2018-01-05 RX ORDER — ISOSORBIDE MONONITRATE 30 MG/1
30 TABLET, EXTENDED RELEASE ORAL DAILY
Qty: 90 TABLET | Refills: 1 | Status: SHIPPED | OUTPATIENT
Start: 2018-01-05 | End: 2018-06-19 | Stop reason: ALTCHOICE

## 2018-01-05 NOTE — TELEPHONE ENCOUNTER
Last ov-10/17/17 with DD  Next ov- not scheduled  Last labs-11/24/17 Renal, glucose, CBC,BMP  Last EKG-11/22/17

## 2018-01-12 ENCOUNTER — HOSPITAL ENCOUNTER (OUTPATIENT)
Dept: OTHER | Age: 48
Discharge: HOME | End: 2018-01-12
Attending: INTERNAL MEDICINE | Admitting: INTERNAL MEDICINE

## 2018-01-12 ENCOUNTER — HOSPITAL ENCOUNTER (OUTPATIENT)
Dept: OTHER | Age: 48
Discharge: HOME | End: 2018-01-12
Attending: NURSE PRACTITIONER | Admitting: NURSE PRACTITIONER

## 2018-01-12 DIAGNOSIS — M51.26 DISPLACEMENT OF LUMBAR INTERVERTEBRAL DISC WITHOUT MYELOPATHY: ICD-10-CM

## 2018-01-12 LAB
ALBUMIN SERPL-MCNC: 3.7 G/DL (ref 3.4–5)
ANION GAP SERPL CALCULATED.3IONS-SCNC: 16 MMOL/L (ref 3–16)
BACTERIA: ABNORMAL /HPF
BILIRUBIN URINE: NEGATIVE
BLOOD, URINE: NEGATIVE
BUN BLDV-MCNC: 58 MG/DL (ref 7–20)
CALCIUM SERPL-MCNC: 9.6 MG/DL (ref 8.3–10.6)
CASTS 2: ABNORMAL /LPF
CASTS: ABNORMAL /LPF
CHLORIDE BLD-SCNC: 97 MMOL/L (ref 99–110)
CLARITY: CLEAR
CO2: 27 MMOL/L (ref 21–32)
COLOR: YELLOW
CREAT SERPL-MCNC: 3.8 MG/DL (ref 0.9–1.3)
CREATININE URINE: 93.3 MG/DL (ref 39–259)
GFR AFRICAN AMERICAN: 21
GFR NON-AFRICAN AMERICAN: 17
GLUCOSE BLD-MCNC: 200 MG/DL (ref 70–99)
GLUCOSE URINE: 100 MG/DL
HCT VFR BLD CALC: 35.8 % (ref 40.5–52.5)
HEMOGLOBIN: 12 G/DL (ref 13.5–17.5)
KETONES, URINE: NEGATIVE MG/DL
LEUKOCYTE ESTERASE, URINE: NEGATIVE
MAGNESIUM: 2.3 MG/DL (ref 1.8–2.4)
MCH RBC QN AUTO: 30.5 PG (ref 26–34)
MCHC RBC AUTO-ENTMCNC: 33.6 G/DL (ref 31–36)
MCV RBC AUTO: 90.8 FL (ref 80–100)
MICROALBUMIN UR-MCNC: 389.9 MG/DL
MICROALBUMIN/CREAT UR-RTO: 4179 MG/G (ref 0–30)
MICROSCOPIC EXAMINATION: YES
NITRITE, URINE: NEGATIVE
PARATHYROID HORMONE INTACT: 123 PG/ML (ref 14–72)
PDW BLD-RTO: 13.6 % (ref 12.4–15.4)
PH UA: 6
PHOSPHORUS: 4.5 MG/DL (ref 2.5–4.9)
PLATELET # BLD: 298 K/UL (ref 135–450)
PMV BLD AUTO: 7.9 FL (ref 5–10.5)
POTASSIUM SERPL-SCNC: 4.7 MMOL/L (ref 3.5–5.1)
PROTEIN PROTEIN: 575 MG/DL
PROTEIN UA: >=300 MG/DL
PROTEIN/CREAT RATIO: 6.2 MG/DL
RBC # BLD: 3.94 M/UL (ref 4.2–5.9)
RBC UA: ABNORMAL /HPF (ref 0–2)
SODIUM BLD-SCNC: 140 MMOL/L (ref 136–145)
SPECIFIC GRAVITY UA: 1.02
URIC ACID, SERUM: 7 MG/DL (ref 3.5–7.2)
UROBILINOGEN, URINE: 0.2 E.U./DL
WBC # BLD: 13.3 K/UL (ref 4–11)
WBC UA: ABNORMAL /HPF (ref 0–5)

## 2018-01-19 ENCOUNTER — OFFICE VISIT (OUTPATIENT)
Dept: FAMILY MEDICINE CLINIC | Age: 48
End: 2018-01-19

## 2018-01-19 VITALS
WEIGHT: 217 LBS | TEMPERATURE: 98.3 F | HEIGHT: 69 IN | OXYGEN SATURATION: 100 % | DIASTOLIC BLOOD PRESSURE: 77 MMHG | HEART RATE: 83 BPM | RESPIRATION RATE: 20 BRPM | SYSTOLIC BLOOD PRESSURE: 131 MMHG | BODY MASS INDEX: 32.14 KG/M2

## 2018-01-19 DIAGNOSIS — J44.1 COPD EXACERBATION (HCC): Primary | ICD-10-CM

## 2018-01-19 DIAGNOSIS — M75.42 IMPINGEMENT SYNDROME OF LEFT SHOULDER: ICD-10-CM

## 2018-01-19 PROCEDURE — 1036F TOBACCO NON-USER: CPT | Performed by: FAMILY MEDICINE

## 2018-01-19 PROCEDURE — 3023F SPIROM DOC REV: CPT | Performed by: FAMILY MEDICINE

## 2018-01-19 PROCEDURE — G8598 ASA/ANTIPLAT THER USED: HCPCS | Performed by: FAMILY MEDICINE

## 2018-01-19 PROCEDURE — G8417 CALC BMI ABV UP PARAM F/U: HCPCS | Performed by: FAMILY MEDICINE

## 2018-01-19 PROCEDURE — 99214 OFFICE O/P EST MOD 30 MIN: CPT | Performed by: FAMILY MEDICINE

## 2018-01-19 PROCEDURE — G8926 SPIRO NO PERF OR DOC: HCPCS | Performed by: FAMILY MEDICINE

## 2018-01-19 PROCEDURE — G8427 DOCREV CUR MEDS BY ELIG CLIN: HCPCS | Performed by: FAMILY MEDICINE

## 2018-01-19 PROCEDURE — G8484 FLU IMMUNIZE NO ADMIN: HCPCS | Performed by: FAMILY MEDICINE

## 2018-01-19 RX ORDER — NEPHROCAP 1 MG
CAP ORAL
Refills: 3 | COMMUNITY
Start: 2018-01-15 | End: 2019-05-30 | Stop reason: SDUPTHER

## 2018-01-19 RX ORDER — CYCLOBENZAPRINE HCL 10 MG
TABLET ORAL
Refills: 2 | Status: ON HOLD | COMMUNITY
Start: 2018-01-16 | End: 2019-04-16 | Stop reason: HOSPADM

## 2018-01-19 RX ORDER — LEVOFLOXACIN 500 MG/1
500 TABLET, FILM COATED ORAL DAILY
Qty: 7 TABLET | Refills: 0 | Status: SHIPPED | OUTPATIENT
Start: 2018-01-19 | End: 2018-01-26

## 2018-01-19 RX ORDER — METHYLPREDNISOLONE 4 MG/1
TABLET ORAL
Qty: 1 KIT | Refills: 0 | Status: SHIPPED | OUTPATIENT
Start: 2018-01-19 | End: 2018-01-25

## 2018-01-19 RX ORDER — DEXTROMETHORPHAN HYDROBROMIDE AND PROMETHAZINE HYDROCHLORIDE 15; 6.25 MG/5ML; MG/5ML
5 SYRUP ORAL 4 TIMES DAILY PRN
Qty: 240 ML | Refills: 0 | Status: SHIPPED | OUTPATIENT
Start: 2018-01-19 | End: 2018-01-26

## 2018-01-19 RX ORDER — IRBESARTAN 300 MG/1
TABLET ORAL
Status: ON HOLD | COMMUNITY
Start: 2017-10-20 | End: 2018-03-24 | Stop reason: HOSPADM

## 2018-01-21 ASSESSMENT — ENCOUNTER SYMPTOMS
HEMOPTYSIS: 0
RHINORRHEA: 1
WHEEZING: 1
COUGH: 1
CHEST TIGHTNESS: 1
SPUTUM PRODUCTION: 1
HEARTBURN: 0
TROUBLE SWALLOWING: 0
SHORTNESS OF BREATH: 1
SORE THROAT: 1

## 2018-01-21 ASSESSMENT — COPD QUESTIONNAIRES: COPD: 1

## 2018-02-27 ENCOUNTER — HOSPITAL ENCOUNTER (OUTPATIENT)
Dept: OTHER | Age: 48
Discharge: HOME | End: 2018-02-27
Attending: INTERNAL MEDICINE | Admitting: INTERNAL MEDICINE

## 2018-02-27 LAB
ALBUMIN SERPL-MCNC: 3.5 G/DL (ref 3.4–5)
ANION GAP SERPL CALCULATED.3IONS-SCNC: 17 MMOL/L (ref 3–16)
BUN BLDV-MCNC: 61 MG/DL (ref 7–20)
CALCIUM SERPL-MCNC: 8.4 MG/DL (ref 8.3–10.6)
CHLORIDE BLD-SCNC: 97 MMOL/L (ref 99–110)
CO2: 22 MMOL/L (ref 21–32)
CREAT SERPL-MCNC: 3.6 MG/DL (ref 0.9–1.3)
GFR AFRICAN AMERICAN: 22
GFR NON-AFRICAN AMERICAN: 18
GLUCOSE BLD-MCNC: 273 MG/DL (ref 70–99)
MAGNESIUM: 2.3 MG/DL (ref 1.8–2.4)
PHOSPHORUS: 3.9 MG/DL (ref 2.5–4.9)
POTASSIUM SERPL-SCNC: 3.8 MMOL/L (ref 3.5–5.1)
SODIUM BLD-SCNC: 136 MMOL/L (ref 136–145)

## 2018-03-05 ENCOUNTER — OFFICE VISIT (OUTPATIENT)
Dept: FAMILY MEDICINE CLINIC | Age: 48
End: 2018-03-05

## 2018-03-05 VITALS
BODY MASS INDEX: 31.55 KG/M2 | OXYGEN SATURATION: 97 % | SYSTOLIC BLOOD PRESSURE: 155 MMHG | HEIGHT: 69 IN | TEMPERATURE: 97.8 F | DIASTOLIC BLOOD PRESSURE: 85 MMHG | WEIGHT: 213 LBS | HEART RATE: 90 BPM

## 2018-03-05 DIAGNOSIS — E78.5 DYSLIPIDEMIA: Chronic | ICD-10-CM

## 2018-03-05 DIAGNOSIS — Z72.0 TOBACCO ABUSE: Chronic | ICD-10-CM

## 2018-03-05 DIAGNOSIS — G47.09 OTHER INSOMNIA: ICD-10-CM

## 2018-03-05 DIAGNOSIS — F41.9 ANXIETY: ICD-10-CM

## 2018-03-05 DIAGNOSIS — F32.89 OTHER DEPRESSION: Primary | ICD-10-CM

## 2018-03-05 DIAGNOSIS — I10 ESSENTIAL HYPERTENSION: Chronic | ICD-10-CM

## 2018-03-05 PROCEDURE — 4004F PT TOBACCO SCREEN RCVD TLK: CPT | Performed by: FAMILY MEDICINE

## 2018-03-05 PROCEDURE — G8417 CALC BMI ABV UP PARAM F/U: HCPCS | Performed by: FAMILY MEDICINE

## 2018-03-05 PROCEDURE — G8427 DOCREV CUR MEDS BY ELIG CLIN: HCPCS | Performed by: FAMILY MEDICINE

## 2018-03-05 PROCEDURE — 3046F HEMOGLOBIN A1C LEVEL >9.0%: CPT | Performed by: FAMILY MEDICINE

## 2018-03-05 PROCEDURE — 99214 OFFICE O/P EST MOD 30 MIN: CPT | Performed by: FAMILY MEDICINE

## 2018-03-05 PROCEDURE — G8598 ASA/ANTIPLAT THER USED: HCPCS | Performed by: FAMILY MEDICINE

## 2018-03-05 PROCEDURE — G8482 FLU IMMUNIZE ORDER/ADMIN: HCPCS | Performed by: FAMILY MEDICINE

## 2018-03-05 RX ORDER — CALCITONIN SALMON 200 [IU]/.09ML
SPRAY, METERED NASAL
Refills: 0 | Status: ON HOLD | COMMUNITY
Start: 2018-02-20 | End: 2018-03-24 | Stop reason: HOSPADM

## 2018-03-05 RX ORDER — FERROUS SULFATE 325(65) MG
325 TABLET ORAL
Qty: 90 TABLET | Refills: 1 | Status: SHIPPED | OUTPATIENT
Start: 2018-03-05 | End: 2018-06-20

## 2018-03-05 RX ORDER — HYDROXYZINE PAMOATE 50 MG/1
CAPSULE ORAL
Qty: 60 CAPSULE | Refills: 2 | Status: ON HOLD | OUTPATIENT
Start: 2018-03-05 | End: 2018-03-24 | Stop reason: HOSPADM

## 2018-03-05 RX ORDER — ZOLPIDEM TARTRATE 5 MG/1
5 TABLET ORAL NIGHTLY PRN
Qty: 30 TABLET | Refills: 0 | Status: CANCELLED | OUTPATIENT
Start: 2018-03-05

## 2018-03-05 ASSESSMENT — PATIENT HEALTH QUESTIONNAIRE - PHQ9
1. LITTLE INTEREST OR PLEASURE IN DOING THINGS: 1
SUM OF ALL RESPONSES TO PHQ QUESTIONS 1-9: 2
SUM OF ALL RESPONSES TO PHQ9 QUESTIONS 1 & 2: 2
2. FEELING DOWN, DEPRESSED OR HOPELESS: 1

## 2018-03-05 NOTE — PROGRESS NOTES
Chief Complaint   Patient presents with    Establish Care         HPI      50 y.o. male presents today to establish care. Previous patient of Dr. Hugo Mancilla. Follows with Dr. Oracio Sherman in pain management. Hx of CAD, combined systolic and diastolic CHF and bicuspid aortic valve follows with Cardiology. Follows with Dr. Lidia Parson regarding ESRD on dialysis previously. The patient has had multiple episodes of syncope after hemodialysis. Dr. Lidia Parson is holding his dialysis for now. He is getting follow-up blood work on Monday. His creatinine has been holding stable at 4. Patient is still urinating on his own    Hx of DM. Last A1C 11/2017 5.6. Not currently on medication. Hx of HTN. Did not take his BP medications  today. /85. He is asymptomatic. Hx of HLD on lipitor 80mg . Reports compliance without medication SE. Has insominia has tried trazadone in the past. Also has anxiety and depression and taking celexa. Denies SI/HI    Hx of COPD reports compliance with current regimen. He is still smoking and on review of records has frequent exacerbations. Tobacco use disorder X 30 years. Currently smoking 1/2 ppd.       Patient Active Problem List   Diagnosis    Acute respiratory failure with hypoxia and hypercapnia (HCC)    Chronic dCHF (grade 2 LVDD)    Chronic obstructive pulmonary disease (HCC)    PVD (peripheral vascular disease) ([address]    Cardiomyopathy ([address]    Sleep apnea    Bicuspid aortic valve    Bilateral hilar adenopathy syndrome    Claudication in peripheral vascular disease ([address]    PVD (peripheral vascular disease) ([address]    Essential hypertension    Diabetic neuropathy (HCC)    Type 2 diabetes, uncontrolled, with neuropathy ([address]    Passive smoke exposure    Depression with anxiety    Pneumonia of right upper lobe due to infectious organism ([address]    DM (diabetes mellitus) ([address]    Hypertensive heart disease with congestive heart failure with preserved left ventricular function ([address]    CHF exacerbation ([address]    Chest pain    Coronary artery disease involving native coronary artery of native heart without angina pectoris    Obesity (BMI 30-39. 9)    ZAINAB on CPAP    Degeneration of lumbar or lumbosacral intervertebral disc    Lumbar radiculopathy    Lumbosacral spondylosis without myelopathy    Biliary colic    Symptomatic cholelithiasis    Pre-operative cardiovascular examination    Gastroparesis due to DM ([facility])    Elevated troponin    Angina, class IV ([facility])    Dyspnea    Elevated brain natriuretic peptide (BNP) level    Dyslipidemia    Non morbid obesity due to excess calories    Tobacco smoking    Respiratory distress    Hypoxia    Chest pressure    Hypertensive urgency    Acute on chronic combined systolic and diastolic congestive heart failure (HCC)    Ischemic cardiomyopathy    Chronic renal failure, stage 5 ([facility])    Tobacco abuse    CKD stage 4 due to type 2 diabetes mellitus ([facility])    CVA (cerebral vascular accident) ([address]    Arterial ischemic stroke, ICA, right, acute ([address]    Type 2 diabetes mellitus without complication, without long-term current use of insulin ([address]    HTN (hypertension), benign    ZAINAB (obstructive sleep apnea)    Diarrhea    Pleural effusion    Chronic anemia    Nonrheumatic aortic valve stenosis    Hypervolemia    Hyperkalemia    Morbid obesity ([facility])    Mucus plugging of bronchi    Hemodialysis-associated hypotension    Left arm pain    Dizziness    ESRD (end stage renal disease) on dialysis ([facility])    Hypotension due to drugs    Acute diastolic CHF (congestive heart failure) ([facility])     Past Medical History:   Diagnosis Date    Arthritis     hands and hips    Asthma     Bilateral hilar adenopathy syndrome 6/3/2013    CAD (coronary artery disease)     CHF (congestive heart failure) ([facility])     COPD (chronic obstructive pulmonary disease) ([facility])     CRF (chronic renal failure) 3/7/2017    Depression     Diabetes mellitus [facility]     Emphysema of lung ([facility]     Gastric ulcer     GERD (gastroesophageal reflux disease)     Heart valve problem     Hemod[facility] patient [facility]     Hyperlipidemia     Hypertension     [facility]     Pneumonia     Sleep apnea     Uses CPAP    Stroke ([facility]     TIA (transient ischemic attack)     Unspecified diseases of blood and blood-forming organs        Past Surgical History:   Procedure Laterality Date    COLONOSCOPY      COLONOSCOPY  2/29/2015    WNL    CORONARY ANGIOPLASTY WITH STENT PLACEMENT  05/26/15    CYST REMOVAL  8-14-13    EXCISION CYSTS, NECK X2 AND ABDOMINAL     DIAGNOSTIC CARDIAC CATH LAB PROCEDURE      DIALYSIS FISTULA CREATION Left 10/30/2017    LEFT BRACHIAL CEPHALIC FISTULA    OTHER SURGICAL HISTORY  02/01/2017    laparoscopic cholecystectomy with intraoperative cholangiogram    TONSILLECTOMY      UPPER GASTROINTESTINAL ENDOSCOPY  01/06/2016    UPPER GASTROINTESTINAL ENDOSCOPY  01/29/2017    possible candida, otherwise normal appearing    VASCULAR SURGERY  aprx 2 years ago    2 stents placed, each side of groin     Most Recent Immunizations   Administered Date(s) Administered    Influenza Virus Vaccine 11/20/2015    Influenza, Intradermal, Quadrivalent, Preservative Free 11/16/2016    Influenza, MDCK Quadrivalent, Preservative Free 10/14/2017    Pneumococcal 13-valent Conjugate ([id number]) 10/14/2017    Pneumococcal Polysaccharide ([id number]) 10/07/2014        No current facility-administered medications for this visit. No current outpatient prescriptions on file.      Facility-Administered Medications Ordered in Other Visits   Medication Dose Route Frequency Provider Last Rate Last Dose    aspirin chewable tablet 81 mg  81 mg Oral Daily Yohana Fuller MD        atorvastatin (LIPITOR) tablet 80 mg  80 mg Oral Daily Yohana Fuller MD        calcium carbonate (TUMS) chewable tablet 500 mg  1 tablet Oral TID PRN Adin Hodgkin, MD        diltiazem (CARDIZEM CD) extended release capsule 180 mg  180 mg Oral Daily Adin Hodgkin, MD        carvedilol (COREG) tablet 12.5 mg  12.5 mg Oral BID WC Adin Hodgkin, MD        citalopram (CELEXA) tablet 20 mg  20 mg Oral Daily Adin Hodgkin, MD        clopidogrel (PLAVIX) tablet 75 mg  75 mg Oral Daily Adin Hodgkin, MD        ferrous sulfate tablet 325 mg  325 mg Oral Daily with breakfast Adin Hodgkin, MD        gabapentin (NEURONTIN) capsule 200 mg  200 mg Oral TID Adin Hodgkin, MD        losartan (COZAAR) tablet 100 mg  100 mg Oral Daily Adin Hodgkin, MD        isosorbide mononitrate (IMDUR) extended release tablet 30 mg  30 mg Oral Daily Adin Hodgkin, MD        nitroGLYCERIN (NITROSTAT) SL tablet 0.4 mg  0.4 mg Sublingual Q5 Min PRN Adin Hodgkin, MD        oxyCODONE-acetaminophen (PERCOCET) 7.5-325 MG per tablet 1 tablet  1 tablet Oral Q6H PRN Adin Hodgkin, MD        furosemide (LASIX) injection 60 mg  60 mg Intravenous BID Adin Hodgkin, MD        sodium chloride flush 0.9 % injection 10 mL  10 mL Intravenous 2 times per day Adin Hodgkin, MD        sodium chloride flush 0.9 % injection 10 mL  10 mL Intravenous PRN Adin Hodgkin, MD        acetaminophen (TYLENOL) tablet 650 mg  650 mg Oral Q4H PRN Adin Hodgkin, MD        magnesium hydroxide (MILK OF MAGNESIA) 400 MG/5ML suspension 30 mL  30 mL Oral Daily PRN Adin Hodgkin, MD        ondansetron [facility]) injection 4 mg  4 mg Intravenous Q6H PRN Adin Hodgkin, MD        heparin (porcine) injection 5,000 Units  5,000 Units Subcutaneous 3 times per day Adin Hodgkin, MD   5,000 Units at 03/18/18 0748    lidocaine (LIDODERM) 5 % 1 patch  1 patch Transdermal Daily Jen Treviño MD        ipratropium-albuterol (DUONEB) nebulizer solution 3 mL  1 vial Inhalation Q6H PRN Jen Treviño MD

## 2018-03-15 ENCOUNTER — HOSPITAL ENCOUNTER (OUTPATIENT)
Dept: MRI IMAGING | Age: 48
Discharge: HOME | End: 2018-03-15
Attending: PHYSICAL MEDICINE & REHABILITATION | Admitting: PHYSICAL MEDICINE & REHABILITATION

## 2018-03-15 DIAGNOSIS — S32.009A CLOSED FRACTURE OF LUMBAR VERTEBRA, UNSPECIFIED FRACTURE MORPHOLOGY, UNSPECIFIED LUMBAR VERTEBRAL LEVEL, INITIAL ENCOUNTER (HCC): ICD-10-CM

## 2018-03-15 DIAGNOSIS — S22.008S: ICD-10-CM

## 2018-03-18 PROBLEM — I50.31 ACUTE DIASTOLIC CHF (CONGESTIVE HEART FAILURE) (HCC): Status: ACTIVE | Noted: 2018-03-18

## 2018-03-18 ASSESSMENT — ENCOUNTER SYMPTOMS: SHORTNESS OF BREATH: 0

## 2018-03-26 ENCOUNTER — TELEPHONE (OUTPATIENT)
Dept: CARDIAC REHAB | Age: 48
End: 2018-03-26

## 2018-03-26 ENCOUNTER — TELEPHONE (OUTPATIENT)
Dept: ADMINISTRATIVE | Age: 48
End: 2018-03-26

## 2018-03-27 ENCOUNTER — CLINICAL DOCUMENTATION (OUTPATIENT)
Dept: SPIRITUAL SERVICES | Age: 48
End: 2018-03-27

## 2018-03-28 ENCOUNTER — CLINICAL DOCUMENTATION (OUTPATIENT)
Dept: SPIRITUAL SERVICES | Age: 48
End: 2018-03-28

## 2018-03-30 ENCOUNTER — CLINICAL DOCUMENTATION (OUTPATIENT)
Dept: SPIRITUAL SERVICES | Age: 48
End: 2018-03-30

## 2018-03-30 NOTE — PROGRESS NOTES
3/30/2018 1:30PM    Spiritual Care    Made follow up phone call attempt. Patient answered and shared that he was in dialysis. He agreed that he would like spiritual care follow up. We agreed to a follow up call the week of April 9th in the afternoon. I will call him that week and attempt to connect and offer support.   5 [facility], [address] [place] [address]

## 2018-04-02 ENCOUNTER — TELEPHONE (OUTPATIENT)
Dept: FAMILY MEDICINE CLINIC | Age: 48
End: 2018-04-02

## 2018-04-04 ENCOUNTER — TELEPHONE (OUTPATIENT)
Dept: FAMILY MEDICINE CLINIC | Age: 48
End: 2018-04-04

## 2018-04-05 ENCOUNTER — HOSPITAL ENCOUNTER (OUTPATIENT)
Dept: VASCULAR LAB | Age: 48
Discharge: HOME | End: 2018-04-05
Attending: EMERGENCY MEDICINE | Admitting: EMERGENCY MEDICINE

## 2018-04-12 RX ORDER — DILTIAZEM HYDROCHLORIDE 180 MG/1
CAPSULE, COATED, EXTENDED RELEASE ORAL
Qty: 90 CAPSULE | Refills: 1 | Status: ON HOLD | OUTPATIENT
Start: 2018-04-12 | End: 2018-09-25 | Stop reason: HOSPADM

## 2018-04-16 RX ORDER — CLOPIDOGREL BISULFATE 75 MG/1
75 TABLET ORAL DAILY
Qty: 30 TABLET | Refills: 3 | Status: SHIPPED | OUTPATIENT
Start: 2018-04-16 | End: 2018-06-12 | Stop reason: CLARIF

## 2018-04-16 RX ORDER — CARVEDILOL 12.5 MG/1
12.5 TABLET ORAL 2 TIMES DAILY WITH MEALS
Qty: 60 TABLET | Refills: 1 | Status: SHIPPED | OUTPATIENT
Start: 2018-04-16 | End: 2018-04-25 | Stop reason: SDUPTHER

## 2018-04-16 RX ORDER — HYDROXYZINE PAMOATE 50 MG/1
50 CAPSULE ORAL 3 TIMES DAILY PRN
Qty: 90 CAPSULE | Refills: 0 | OUTPATIENT
Start: 2018-04-16 | End: 2018-05-16

## 2018-04-16 RX ORDER — TORSEMIDE 100 MG/1
100 TABLET ORAL DAILY
Qty: 90 TABLET | Refills: 0 | Status: SHIPPED | OUTPATIENT
Start: 2018-04-16 | End: 2018-04-25 | Stop reason: SDUPTHER

## 2018-04-16 RX ORDER — GLUCOSAMINE HCL/CHONDROITIN SU 500-400 MG
CAPSULE ORAL
Qty: 100 STRIP | Refills: 3 | Status: SHIPPED | OUTPATIENT
Start: 2018-04-16 | End: 2018-04-25 | Stop reason: SDUPTHER

## 2018-04-16 RX ORDER — LANCETS
EACH MISCELLANEOUS
Qty: 300 EACH | Refills: 3 | Status: SHIPPED | OUTPATIENT
Start: 2018-04-16 | End: 2018-04-25 | Stop reason: SDUPTHER

## 2018-04-16 RX ORDER — TRAZODONE HYDROCHLORIDE 150 MG/1
150 TABLET ORAL NIGHTLY
Qty: 90 TABLET | Refills: 0 | Status: SHIPPED | OUTPATIENT
Start: 2018-04-16 | End: 2018-04-25 | Stop reason: SDUPTHER

## 2018-04-16 RX ORDER — GABAPENTIN 100 MG/1
100 CAPSULE ORAL 3 TIMES DAILY
Qty: 90 CAPSULE | Refills: 0 | Status: SHIPPED | OUTPATIENT
Start: 2018-04-16 | End: 2018-06-12 | Stop reason: CLARIF

## 2018-04-16 RX ORDER — DILTIAZEM HYDROCHLORIDE 180 MG/1
180 CAPSULE, EXTENDED RELEASE ORAL DAILY
Qty: 30 CAPSULE | Refills: 1 | Status: SHIPPED | OUTPATIENT
Start: 2018-04-16 | End: 2018-04-25 | Stop reason: SDUPTHER

## 2018-04-16 RX ORDER — TORSEMIDE 20 MG/1
TABLET ORAL
Refills: 3 | COMMUNITY
Start: 2018-04-04 | End: 2018-06-12 | Stop reason: CLARIF

## 2018-04-16 RX ORDER — BLOOD PRESSURE TEST KIT
KIT MISCELLANEOUS
Qty: 300 EACH | Refills: 3 | Status: SHIPPED | OUTPATIENT
Start: 2018-04-16 | End: 2018-04-25 | Stop reason: SDUPTHER

## 2018-04-16 RX ORDER — ATORVASTATIN CALCIUM 40 MG/1
TABLET, FILM COATED ORAL
Refills: 3 | Status: ON HOLD | COMMUNITY
Start: 2018-04-05 | End: 2018-09-25 | Stop reason: HOSPADM

## 2018-04-17 ENCOUNTER — TELEPHONE (OUTPATIENT)
Dept: FAMILY MEDICINE CLINIC | Age: 48
End: 2018-04-17

## 2018-04-18 ENCOUNTER — CLINICAL DOCUMENTATION (OUTPATIENT)
Dept: SPIRITUAL SERVICES | Age: 48
End: 2018-04-18

## 2018-04-25 DIAGNOSIS — G47.09 OTHER INSOMNIA: ICD-10-CM

## 2018-04-25 DIAGNOSIS — F41.9 ANXIETY: ICD-10-CM

## 2018-04-25 RX ORDER — DILTIAZEM HYDROCHLORIDE 180 MG/1
180 CAPSULE, EXTENDED RELEASE ORAL DAILY
Qty: 90 CAPSULE | Refills: 1 | Status: SHIPPED | OUTPATIENT
Start: 2018-04-25 | End: 2018-06-12 | Stop reason: CLARIF

## 2018-04-25 RX ORDER — GLUCOSAMINE HCL/CHONDROITIN SU 500-400 MG
CAPSULE ORAL
Qty: 100 STRIP | Refills: 3 | Status: SHIPPED | OUTPATIENT
Start: 2018-04-25 | End: 2022-02-10

## 2018-04-25 RX ORDER — GABAPENTIN 300 MG/1
CAPSULE ORAL
Qty: 270 CAPSULE | Refills: 1 | OUTPATIENT
Start: 2018-04-25 | End: 2018-07-25

## 2018-04-25 RX ORDER — TORSEMIDE 100 MG/1
100 TABLET ORAL DAILY
Qty: 90 TABLET | Refills: 1 | Status: SHIPPED | OUTPATIENT
Start: 2018-04-25 | End: 2018-06-12 | Stop reason: CLARIF

## 2018-04-25 RX ORDER — CARVEDILOL 12.5 MG/1
12.5 TABLET ORAL 2 TIMES DAILY WITH MEALS
Qty: 180 TABLET | Refills: 1 | Status: ON HOLD | OUTPATIENT
Start: 2018-04-25 | End: 2018-09-25

## 2018-04-25 RX ORDER — BLOOD PRESSURE TEST KIT
KIT MISCELLANEOUS
Qty: 300 EACH | Refills: 3 | Status: SHIPPED | OUTPATIENT
Start: 2018-04-25

## 2018-04-25 RX ORDER — TRAZODONE HYDROCHLORIDE 150 MG/1
150 TABLET ORAL NIGHTLY
Qty: 90 TABLET | Refills: 1 | Status: SHIPPED | OUTPATIENT
Start: 2018-04-25 | End: 2018-11-21 | Stop reason: SDUPTHER

## 2018-04-25 RX ORDER — CLOPIDOGREL BISULFATE 75 MG/1
TABLET ORAL
Qty: 90 TABLET | Refills: 1 | Status: SHIPPED | OUTPATIENT
Start: 2018-04-25 | End: 2018-11-21 | Stop reason: SDUPTHER

## 2018-04-25 RX ORDER — LANCETS
EACH MISCELLANEOUS
Qty: 300 EACH | Refills: 3 | Status: SHIPPED | OUTPATIENT
Start: 2018-04-25 | End: 2019-08-19 | Stop reason: SDUPTHER

## 2018-04-25 RX ORDER — HYDROXYZINE PAMOATE 50 MG/1
CAPSULE ORAL
Qty: 180 CAPSULE | Refills: 1 | OUTPATIENT
Start: 2018-04-25

## 2018-05-04 ENCOUNTER — CLINICAL DOCUMENTATION (OUTPATIENT)
Dept: SPIRITUAL SERVICES | Age: 48
End: 2018-05-04

## 2018-05-16 RX ORDER — HYDROXYZINE PAMOATE 50 MG/1
CAPSULE ORAL
Qty: 180 CAPSULE | Refills: 0 | Status: SHIPPED | OUTPATIENT
Start: 2018-05-16 | End: 2019-06-26

## 2018-06-07 ENCOUNTER — TELEPHONE (OUTPATIENT)
Dept: SURGERY | Age: 48
End: 2018-06-07

## 2018-06-07 ENCOUNTER — INITIAL CONSULT (OUTPATIENT)
Dept: SURGERY | Age: 48
End: 2018-06-07

## 2018-06-07 ENCOUNTER — TELEPHONE (OUTPATIENT)
Dept: CARDIOLOGY CLINIC | Age: 48
End: 2018-06-07

## 2018-06-07 VITALS
WEIGHT: 218.4 LBS | DIASTOLIC BLOOD PRESSURE: 80 MMHG | HEIGHT: 68 IN | SYSTOLIC BLOOD PRESSURE: 143 MMHG | BODY MASS INDEX: 33.1 KG/M2 | HEART RATE: 92 BPM

## 2018-06-07 DIAGNOSIS — N18.5 CHRONIC RENAL FAILURE, STAGE 5 (HCC): Primary | ICD-10-CM

## 2018-06-07 PROCEDURE — 99203 OFFICE O/P NEW LOW 30 MIN: CPT | Performed by: SURGERY

## 2018-06-07 PROCEDURE — G8427 DOCREV CUR MEDS BY ELIG CLIN: HCPCS | Performed by: SURGERY

## 2018-06-07 PROCEDURE — G8417 CALC BMI ABV UP PARAM F/U: HCPCS | Performed by: SURGERY

## 2018-06-07 PROCEDURE — 3017F COLORECTAL CA SCREEN DOC REV: CPT | Performed by: SURGERY

## 2018-06-11 ENCOUNTER — TELEPHONE (OUTPATIENT)
Dept: FAMILY MEDICINE CLINIC | Age: 48
End: 2018-06-11

## 2018-06-12 ENCOUNTER — OFFICE VISIT (OUTPATIENT)
Dept: FAMILY MEDICINE CLINIC | Age: 48
End: 2018-06-12

## 2018-06-12 VITALS
BODY MASS INDEX: 32.84 KG/M2 | HEART RATE: 72 BPM | DIASTOLIC BLOOD PRESSURE: 82 MMHG | WEIGHT: 216 LBS | TEMPERATURE: 98.6 F | SYSTOLIC BLOOD PRESSURE: 164 MMHG

## 2018-06-12 DIAGNOSIS — G70.9 NEUROMUSCULAR DISORDER (HCC): ICD-10-CM

## 2018-06-12 DIAGNOSIS — N18.6 ESRD (END STAGE RENAL DISEASE) (HCC): ICD-10-CM

## 2018-06-12 PROCEDURE — 4004F PT TOBACCO SCREEN RCVD TLK: CPT | Performed by: FAMILY MEDICINE

## 2018-06-12 PROCEDURE — 3017F COLORECTAL CA SCREEN DOC REV: CPT | Performed by: FAMILY MEDICINE

## 2018-06-12 PROCEDURE — G8427 DOCREV CUR MEDS BY ELIG CLIN: HCPCS | Performed by: FAMILY MEDICINE

## 2018-06-12 PROCEDURE — 3045F PR MOST RECENT HEMOGLOBIN A1C LEVEL 7.0-9.0%: CPT | Performed by: FAMILY MEDICINE

## 2018-06-12 PROCEDURE — 2022F DILAT RTA XM EVC RTNOPTHY: CPT | Performed by: FAMILY MEDICINE

## 2018-06-12 PROCEDURE — G8417 CALC BMI ABV UP PARAM F/U: HCPCS | Performed by: FAMILY MEDICINE

## 2018-06-12 PROCEDURE — 99214 OFFICE O/P EST MOD 30 MIN: CPT | Performed by: FAMILY MEDICINE

## 2018-06-12 PROCEDURE — G8598 ASA/ANTIPLAT THER USED: HCPCS | Performed by: FAMILY MEDICINE

## 2018-06-12 RX ORDER — TIZANIDINE 4 MG/1
TABLET ORAL
Refills: 1 | COMMUNITY
Start: 2018-04-24 | End: 2018-06-20

## 2018-06-12 RX ORDER — POLYETHYLENE GLYCOL 3350
GRANULES (GRAM) MISCELLANEOUS
Status: ON HOLD | COMMUNITY
Start: 2018-05-02 | End: 2022-04-27 | Stop reason: HOSPADM

## 2018-06-12 RX ORDER — MULTIVITAMINS WITH FLUORIDE 0.25 MG
TABLET,CHEWABLE ORAL
COMMUNITY
Start: 2018-05-02 | End: 2019-05-30 | Stop reason: SDUPTHER

## 2018-06-19 ENCOUNTER — OFFICE VISIT (OUTPATIENT)
Dept: CARDIOLOGY CLINIC | Age: 48
End: 2018-06-19

## 2018-06-19 DIAGNOSIS — I50.42 CHRONIC COMBINED SYSTOLIC AND DIASTOLIC CONGESTIVE HEART FAILURE (HCC): ICD-10-CM

## 2018-06-19 DIAGNOSIS — E78.2 MIXED HYPERLIPIDEMIA: ICD-10-CM

## 2018-06-19 DIAGNOSIS — I73.9 PVD (PERIPHERAL VASCULAR DISEASE) (HCC): ICD-10-CM

## 2018-06-19 DIAGNOSIS — I11.0 HYPERTENSIVE HEART DISEASE WITH CONGESTIVE HEART FAILURE WITH PRESERVED LEFT VENTRICULAR FUNCTION (HCC): ICD-10-CM

## 2018-06-19 DIAGNOSIS — Z99.2 ESRD (END STAGE RENAL DISEASE) ON DIALYSIS (HCC): ICD-10-CM

## 2018-06-19 DIAGNOSIS — I50.30 HYPERTENSIVE HEART DISEASE WITH CONGESTIVE HEART FAILURE WITH PRESERVED LEFT VENTRICULAR FUNCTION (HCC): ICD-10-CM

## 2018-06-19 DIAGNOSIS — Z01.818 PRE-OP EXAM: Primary | ICD-10-CM

## 2018-06-19 DIAGNOSIS — I25.5 ISCHEMIC CARDIOMYOPATHY: ICD-10-CM

## 2018-06-19 DIAGNOSIS — N18.6 ESRD (END STAGE RENAL DISEASE) ON DIALYSIS (HCC): ICD-10-CM

## 2018-06-19 DIAGNOSIS — I25.10 CORONARY ARTERY DISEASE INVOLVING NATIVE CORONARY ARTERY OF NATIVE HEART WITHOUT ANGINA PECTORIS: ICD-10-CM

## 2018-06-19 DIAGNOSIS — Q23.1 BICUSPID AORTIC VALVE: ICD-10-CM

## 2018-06-19 PROCEDURE — 2022F DILAT RTA XM EVC RTNOPTHY: CPT | Performed by: NURSE PRACTITIONER

## 2018-06-19 PROCEDURE — 93000 ELECTROCARDIOGRAM COMPLETE: CPT | Performed by: NURSE PRACTITIONER

## 2018-06-19 PROCEDURE — G8598 ASA/ANTIPLAT THER USED: HCPCS | Performed by: NURSE PRACTITIONER

## 2018-06-19 PROCEDURE — 3045F PR MOST RECENT HEMOGLOBIN A1C LEVEL 7.0-9.0%: CPT | Performed by: NURSE PRACTITIONER

## 2018-06-19 PROCEDURE — 3017F COLORECTAL CA SCREEN DOC REV: CPT | Performed by: NURSE PRACTITIONER

## 2018-06-19 PROCEDURE — 4004F PT TOBACCO SCREEN RCVD TLK: CPT | Performed by: NURSE PRACTITIONER

## 2018-06-19 PROCEDURE — G8417 CALC BMI ABV UP PARAM F/U: HCPCS | Performed by: NURSE PRACTITIONER

## 2018-06-19 PROCEDURE — G8427 DOCREV CUR MEDS BY ELIG CLIN: HCPCS | Performed by: NURSE PRACTITIONER

## 2018-06-19 PROCEDURE — 99214 OFFICE O/P EST MOD 30 MIN: CPT | Performed by: NURSE PRACTITIONER

## 2018-06-19 RX ORDER — PRAVASTATIN SODIUM 80 MG/1
80 TABLET ORAL DAILY
Qty: 90 TABLET | Refills: 1 | Status: SHIPPED | OUTPATIENT
Start: 2018-06-19 | End: 2018-11-21 | Stop reason: SDUPTHER

## 2018-06-19 NOTE — PROGRESS NOTES
Yes Mateo Vásquez MD   tiZANidine (ZANAFLEX) 4 MG tablet TK 1 T PO Q 8 H PRN 4/24/18   Historical Provider, MD   hydrOXYzine (VISTARIL) 50 MG capsule TAKE 1 TO 2 CAPSULES EVERY NIGHT 5/16/18   Christal Orlando MD   clopidogrel (PLAVIX) 75 MG tablet TAKE 1 TABLET EVERY DAY 4/25/18   Christal Orlando MD   atorvastatin (LIPITOR) 40 MG tablet TK 1 T PO D 4/5/18   Historical Provider, MD   ferrous sulfate 325 (65 Fe) MG tablet Take 1 tablet by mouth daily (with breakfast) 3/5/18   Christal Orlando MD   isosorbide mononitrate (IMDUR) 30 MG extended release tablet Take 1 tablet by mouth daily 1/5/18   JAY Keys - CNP   citalopram (CELEXA) 20 MG tablet TAKE 1 TABLET EVERY DAY 4/19/17   Yadiel Chatman MD        Allergies:  Patient has no known allergies. Review of Systems:   · Constitutional: there has been no unanticipated weight loss. There's been no change in energy level, sleep pattern, or activity level. · Eyes: No visual changes or diplopia. No scleral icterus. · ENT: No Headaches, hearing loss or vertigo. No mouth sores or sore throat. · Cardiovascular: Reviewed in HPI  · Respiratory: No cough or wheezing, no sputum production. No hematemesis. · Gastrointestinal: No abdominal pain, appetite loss, blood in stools. No change in bowel or bladder habits. · Genitourinary: No dysuria, trouble voiding, or hematuria. · Musculoskeletal:  No gait disturbance, weakness or joint complaints. · Integumentary: No rash or pruritis. · Neurological: No headache, diplopia, change in muscle strength, numbness or tingling. No change in gait, balance, coordination, mood, affect, memory, mentation, behavior. · Psychiatric: No anxiety, no depression. · Endocrine: No malaise, fatigue or temperature intolerance. No excessive thirst, fluid intake, or urination. No tremor. · Hematologic/Lymphatic: No abnormal bruising or bleeding, blood clots or swollen lymph nodes.   · Allergic/Immunologic: No nasal congestion or hives. Physical Examination:    Vitals:    06/19/18 1611   BP: (!) 154/80   Site: Right Arm   Position: Sitting   Cuff Size: Medium Adult   Weight: 219 lb 3.2 oz (99.4 kg)   Height: 5' 8\" (1.727 m)        Constitutional and General Appearance: Warm and dry, no apparent distress, normal coloration  HEENT:  Normocephalic, atraumatic  Respiratory:  · Normal excursion and expansion without use of accessory muscles  · Resp Auscultation: Normal breath sounds without dullness  Cardiovascular:  · The apical impulses not displaced  · Heart tones are crisp and normal  · JVP 8 cm H2O  · Regular rate and rhythm, normal S1S2, no m/g/r/c  · Peripheral pulses are symmetrical and full  · There is no clubbing, cyanosis of the extremities.   · Trace ankle edema bilat  · Pedal Pulses: 2+ and equal   Abdomen:  · No masses or tenderness  · Liver/Spleen: No Abnormalities Noted  Neurological/Psychiatric:  · Alert and oriented in all spheres  · Moves all extremities well  · Exhibits normal gait balance and coordination  · No abnormalities of mood, affect, memory, mentation, or behavior are noted    Lab Data:    CBC:   Lab Results   Component Value Date    WBC 16.0 04/10/2018    WBC 15.0 03/30/2018    WBC 17.6 03/24/2018    RBC 3.22 04/10/2018    RBC 3.53 03/30/2018    RBC 3.10 03/24/2018    HGB 9.6 04/10/2018    HGB 10.8 03/30/2018    HGB 9.5 03/24/2018    HCT 29.5 04/10/2018    HCT 31.5 03/30/2018    HCT 27.3 03/24/2018    MCV 91.6 04/10/2018    MCV 89.1 03/30/2018    MCV 88.2 03/24/2018    RDW 14.9 04/10/2018    RDW 14.2 03/30/2018    RDW 14.0 03/24/2018     04/10/2018     03/30/2018     03/24/2018     BMP:  Lab Results   Component Value Date     04/10/2018     03/30/2018     03/24/2018    K 5.1 04/10/2018    K 3.6 03/30/2018    K 6.0 03/24/2018    K 4.3 03/19/2018    CL 94 04/10/2018    CL 96 03/30/2018    CL 87 03/24/2018    CO2 25 04/10/2018    CO2 26 03/30/2018    CO2 20

## 2018-06-19 NOTE — PATIENT INSTRUCTIONS
1. Follow up with Dr. Feliciano Dennis regarding stents in legs and left upper arm AV fistula  2. Okay to proceed with surgery for PD catheter, okay to hold Plavix for 5 days before and after surgery  3. Stop the atorvastatin, start instead pravastatin 80 mg daily instead  4. Continue other current heart medicines  5.  Follow up with me in 6 months

## 2018-06-20 ENCOUNTER — PAT TELEPHONE (OUTPATIENT)
Dept: PREADMISSION TESTING | Age: 48
End: 2018-06-20

## 2018-06-20 VITALS
SYSTOLIC BLOOD PRESSURE: 154 MMHG | WEIGHT: 219.2 LBS | DIASTOLIC BLOOD PRESSURE: 80 MMHG | BODY MASS INDEX: 33.22 KG/M2 | HEIGHT: 68 IN

## 2018-06-20 VITALS — HEIGHT: 68 IN | BODY MASS INDEX: 33.04 KG/M2 | WEIGHT: 218 LBS

## 2018-06-22 ENCOUNTER — OFFICE VISIT (OUTPATIENT)
Dept: VASCULAR SURGERY | Age: 48
End: 2018-06-22

## 2018-06-22 VITALS
BODY MASS INDEX: 32.14 KG/M2 | HEIGHT: 69 IN | DIASTOLIC BLOOD PRESSURE: 80 MMHG | SYSTOLIC BLOOD PRESSURE: 120 MMHG | WEIGHT: 217 LBS

## 2018-06-22 DIAGNOSIS — Z99.2 ENCOUNTER REGARDING VASCULAR ACCESS FOR DIALYSIS FOR ESRD (HCC): Primary | ICD-10-CM

## 2018-06-22 DIAGNOSIS — N18.6 ENCOUNTER REGARDING VASCULAR ACCESS FOR DIALYSIS FOR ESRD (HCC): Primary | ICD-10-CM

## 2018-06-22 PROCEDURE — 4004F PT TOBACCO SCREEN RCVD TLK: CPT | Performed by: SURGERY

## 2018-06-22 PROCEDURE — G8427 DOCREV CUR MEDS BY ELIG CLIN: HCPCS | Performed by: SURGERY

## 2018-06-22 PROCEDURE — 3017F COLORECTAL CA SCREEN DOC REV: CPT | Performed by: SURGERY

## 2018-06-22 PROCEDURE — G8598 ASA/ANTIPLAT THER USED: HCPCS | Performed by: SURGERY

## 2018-06-22 PROCEDURE — 99214 OFFICE O/P EST MOD 30 MIN: CPT | Performed by: SURGERY

## 2018-06-22 PROCEDURE — G8417 CALC BMI ABV UP PARAM F/U: HCPCS | Performed by: SURGERY

## 2018-06-22 RX ORDER — ISOSORBIDE MONONITRATE 30 MG/1
TABLET, EXTENDED RELEASE ORAL
Qty: 90 TABLET | Refills: 1 | Status: SHIPPED | OUTPATIENT
Start: 2018-06-22 | End: 2018-11-28 | Stop reason: SDUPTHER

## 2018-06-26 ENCOUNTER — HOSPITAL ENCOUNTER (OUTPATIENT)
Dept: SURGERY | Age: 48
Discharge: HOME | End: 2018-06-26
Attending: SURGERY | Admitting: SURGERY

## 2018-06-26 VITALS
DIASTOLIC BLOOD PRESSURE: 67 MMHG | SYSTOLIC BLOOD PRESSURE: 141 MMHG | TEMPERATURE: 97.4 F | RESPIRATION RATE: 20 BRPM | BODY MASS INDEX: 31.4 KG/M2 | OXYGEN SATURATION: 99 % | WEIGHT: 212 LBS | HEIGHT: 69 IN | HEART RATE: 85 BPM

## 2018-06-26 DIAGNOSIS — R52 PAIN: ICD-10-CM

## 2018-06-26 LAB
ANION GAP SERPL CALCULATED.3IONS-SCNC: 14 MMOL/L (ref 3–16)
BUN BLDV-MCNC: 31 MG/DL (ref 7–20)
CALCIUM SERPL-MCNC: 9.1 MG/DL (ref 8.3–10.6)
CHLORIDE BLD-SCNC: 96 MMOL/L (ref 99–110)
CO2: 25 MMOL/L (ref 21–32)
CREAT SERPL-MCNC: 3.2 MG/DL (ref 0.9–1.3)
GFR AFRICAN AMERICAN: 25
GFR NON-AFRICAN AMERICAN: 21
GLUCOSE BLD-MCNC: 135 MG/DL (ref 70–99)
GLUCOSE BLD-MCNC: 140 MG/DL (ref 70–99)
GLUCOSE BLD-MCNC: 148 MG/DL (ref 70–99)
PERFORMED ON: ABNORMAL
PERFORMED ON: ABNORMAL
POTASSIUM REFLEX MAGNESIUM: 3.8 MMOL/L (ref 3.5–5.1)
SODIUM BLD-SCNC: 135 MMOL/L (ref 136–145)

## 2018-06-26 PROCEDURE — 49324 LAP INSERT TUNNEL IP CATH: CPT | Performed by: SURGERY

## 2018-06-26 RX ORDER — OXYCODONE HYDROCHLORIDE 5 MG/1
5 TABLET ORAL EVERY 6 HOURS PRN
Qty: 20 TABLET | Refills: 0 | Status: SHIPPED | OUTPATIENT
Start: 2018-06-26 | End: 2018-07-01

## 2018-06-26 RX ORDER — FENTANYL CITRATE 50 UG/ML
50 INJECTION, SOLUTION INTRAMUSCULAR; INTRAVENOUS EVERY 5 MIN PRN
Status: DISCONTINUED | OUTPATIENT
Start: 2018-06-26 | End: 2018-06-27 | Stop reason: HOSPADM

## 2018-06-26 RX ORDER — FENTANYL CITRATE 50 UG/ML
25 INJECTION, SOLUTION INTRAMUSCULAR; INTRAVENOUS EVERY 5 MIN PRN
Status: DISCONTINUED | OUTPATIENT
Start: 2018-06-26 | End: 2018-06-27 | Stop reason: HOSPADM

## 2018-06-26 RX ORDER — MIDAZOLAM HYDROCHLORIDE 1 MG/ML
2 INJECTION INTRAMUSCULAR; INTRAVENOUS ONCE
Status: COMPLETED | OUTPATIENT
Start: 2018-06-26 | End: 2018-06-26

## 2018-06-26 RX ORDER — ONDANSETRON 2 MG/ML
4 INJECTION INTRAMUSCULAR; INTRAVENOUS
Status: COMPLETED | OUTPATIENT
Start: 2018-06-26 | End: 2018-06-26

## 2018-06-26 RX ORDER — OXYCODONE HYDROCHLORIDE AND ACETAMINOPHEN 5; 325 MG/1; MG/1
TABLET ORAL
Status: COMPLETED
Start: 2018-06-26 | End: 2018-06-26

## 2018-06-26 RX ORDER — SODIUM CHLORIDE, SODIUM LACTATE, POTASSIUM CHLORIDE, CALCIUM CHLORIDE 600; 310; 30; 20 MG/100ML; MG/100ML; MG/100ML; MG/100ML
INJECTION, SOLUTION INTRAVENOUS CONTINUOUS
Status: DISCONTINUED | OUTPATIENT
Start: 2018-06-26 | End: 2018-06-27 | Stop reason: HOSPADM

## 2018-06-26 RX ORDER — MIDAZOLAM HYDROCHLORIDE 1 MG/ML
INJECTION INTRAMUSCULAR; INTRAVENOUS
Status: COMPLETED
Start: 2018-06-26 | End: 2018-06-26

## 2018-06-26 RX ORDER — OXYCODONE HYDROCHLORIDE AND ACETAMINOPHEN 5; 325 MG/1; MG/1
1 TABLET ORAL
Status: COMPLETED | OUTPATIENT
Start: 2018-06-26 | End: 2018-06-26

## 2018-06-26 RX ORDER — LIDOCAINE HYDROCHLORIDE 10 MG/ML
1 INJECTION, SOLUTION EPIDURAL; INFILTRATION; INTRACAUDAL; PERINEURAL
Status: ACTIVE | OUTPATIENT
Start: 2018-06-26 | End: 2018-06-26

## 2018-06-26 RX ORDER — DEXAMETHASONE SODIUM PHOSPHATE 4 MG/ML
4 INJECTION, SOLUTION INTRA-ARTICULAR; INTRALESIONAL; INTRAMUSCULAR; INTRAVENOUS; SOFT TISSUE
Status: ACTIVE | OUTPATIENT
Start: 2018-06-26 | End: 2018-06-26

## 2018-06-26 RX ADMIN — ONDANSETRON 4 MG: 2 INJECTION INTRAMUSCULAR; INTRAVENOUS at 13:10

## 2018-06-26 RX ADMIN — Medication 0.25 MG: at 13:31

## 2018-06-26 RX ADMIN — FENTANYL CITRATE 50 MCG: 50 INJECTION, SOLUTION INTRAMUSCULAR; INTRAVENOUS at 13:00

## 2018-06-26 RX ADMIN — OXYCODONE HYDROCHLORIDE AND ACETAMINOPHEN 1 TABLET: 5; 325 TABLET ORAL at 14:36

## 2018-06-26 RX ADMIN — MIDAZOLAM HYDROCHLORIDE 2 MG: 1 INJECTION INTRAMUSCULAR; INTRAVENOUS at 10:45

## 2018-06-26 RX ADMIN — MIDAZOLAM HYDROCHLORIDE 2 MG: 1 INJECTION INTRAMUSCULAR; INTRAVENOUS at 09:55

## 2018-06-26 RX ADMIN — FENTANYL CITRATE 50 MCG: 50 INJECTION, SOLUTION INTRAMUSCULAR; INTRAVENOUS at 13:11

## 2018-06-26 ASSESSMENT — PAIN DESCRIPTION - LOCATION
LOCATION: ABDOMEN
LOCATION: ABDOMEN

## 2018-06-26 ASSESSMENT — PAIN SCALES - GENERAL
PAINLEVEL_OUTOF10: 8
PAINLEVEL_OUTOF10: 6
PAINLEVEL_OUTOF10: 8
PAINLEVEL_OUTOF10: 6
PAINLEVEL_OUTOF10: 8
PAINLEVEL_OUTOF10: 6
PAINLEVEL_OUTOF10: 6

## 2018-06-26 ASSESSMENT — PAIN DESCRIPTION - PROGRESSION
CLINICAL_PROGRESSION: GRADUALLY IMPROVING
CLINICAL_PROGRESSION: GRADUALLY WORSENING

## 2018-06-26 ASSESSMENT — PAIN DESCRIPTION - ORIENTATION: ORIENTATION: ANTERIOR

## 2018-06-26 ASSESSMENT — COPD QUESTIONNAIRES: CAT_SEVERITY: MODERATE

## 2018-06-26 ASSESSMENT — PAIN DESCRIPTION - DESCRIPTORS
DESCRIPTORS: DISCOMFORT
DESCRIPTORS: CONSTANT

## 2018-06-26 ASSESSMENT — PAIN - FUNCTIONAL ASSESSMENT: PAIN_FUNCTIONAL_ASSESSMENT: 0-10

## 2018-06-26 ASSESSMENT — LIFESTYLE VARIABLES: SMOKING_STATUS: 1

## 2018-06-26 ASSESSMENT — PAIN DESCRIPTION - PAIN TYPE
TYPE: SURGICAL PAIN
TYPE: SURGICAL PAIN

## 2018-06-26 ASSESSMENT — PAIN DESCRIPTION - FREQUENCY: FREQUENCY: CONTINUOUS

## 2018-06-26 ASSESSMENT — PAIN DESCRIPTION - ONSET: ONSET: ON-GOING

## 2018-06-28 ENCOUNTER — CLINICAL DOCUMENTATION (OUTPATIENT)
Dept: SPIRITUAL SERVICES | Age: 48
End: 2018-06-28

## 2018-06-29 ENCOUNTER — TELEPHONE (OUTPATIENT)
Dept: SURGERY | Age: 48
End: 2018-06-29

## 2018-07-12 ENCOUNTER — OFFICE VISIT (OUTPATIENT)
Dept: SURGERY | Age: 48
End: 2018-07-12

## 2018-07-12 VITALS
HEART RATE: 94 BPM | SYSTOLIC BLOOD PRESSURE: 81 MMHG | DIASTOLIC BLOOD PRESSURE: 51 MMHG | HEIGHT: 69 IN | WEIGHT: 214.2 LBS | BODY MASS INDEX: 31.73 KG/M2

## 2018-07-12 DIAGNOSIS — N18.5 CHRONIC RENAL FAILURE, STAGE 5 (HCC): Primary | ICD-10-CM

## 2018-07-12 PROCEDURE — 99024 POSTOP FOLLOW-UP VISIT: CPT | Performed by: SURGERY

## 2018-07-12 NOTE — PATIENT INSTRUCTIONS
Patient Education        Stopping Smoking: Care Instructions  Your Care Instructions  Cigarette smokers crave the nicotine in cigarettes. Giving it up is much harder than simply changing a habit. Your body has to stop craving the nicotine. It is hard to quit, but you can do it. There are many tools that people use to quit smoking. You may find that combining tools works best for you. There are several steps to quitting. First you get ready to quit. Then you get support to help you. After that, you learn new skills and behaviors to become a nonsmoker. For many people, a necessary step is getting and using medicine. Your doctor will help you set up the plan that best meets your needs. You may want to attend a smoking cessation program to help you quit smoking. When you choose a program, look for one that has proven success. Ask your doctor for ideas. You will greatly increase your chances of success if you take medicine as well as get counseling or join a cessation program.  Some of the changes you feel when you first quit tobacco are uncomfortable. Your body will miss the nicotine at first, and you may feel short-tempered and grumpy. You may have trouble sleeping or concentrating. Medicine can help you deal with these symptoms. You may struggle with changing your smoking habits and rituals. The last step is the tricky one: Be prepared for the smoking urge to continue for a time. This is a lot to deal with, but keep at it. You will feel better. Follow-up care is a key part of your treatment and safety. Be sure to make and go to all appointments, and call your doctor if you are having problems. It's also a good idea to know your test results and keep a list of the medicines you take. How can you care for yourself at home? · Ask your family, friends, and coworkers for support. You have a better chance of quitting if you have help and support.   · Join a support group, such as Nicotine Anonymous, for people who are nicotine. · Be prepared to keep trying. Most people are not successful the first few times they try to quit. Do not get mad at yourself if you smoke again. Make a list of things you learned and think about when you want to try again, such as next week, next month, or next year. Where can you learn more? Go to https://Secretpeking.Oneflare. org and sign in to your [employer] account. Enter F806 in the Adyuka box to learn more about \"Stopping Smoking: Care Instructions. \"     If you do not have an account, please click on the \"Sign Up Now\" link. Current as of: November 29, 2017  Content Version: 11.6  © 8879-0519 [employer]. Care instructions adapted under license by [facility][note 3] [address] If you have questions about a medical condition or this instruction, always ask your healthcare professional. [address] any warranty or liability for your use of this information.

## 2018-07-12 NOTE — PROGRESS NOTES
HPI: Nursing notes reviewed. Patient is a 47 yo who underwent laparoscopic PD catheter insertion at [facility].   Reports minimal pain and no nausea. Energy is near normal    ROS:  10 point review of systems performed with pertinent positives in HPI    Phys:    Abd - soft, appropriately tender, nondistended, pd catheter secure   Incisions - no erythema or drainage    Assesment: 47 yo s/p laparoscopic PD catheter insertion    Plan: 1. Doing well postop with minimal pain and no nausea   2. No activity limitations   3.   Call with concerns

## 2018-07-24 ENCOUNTER — CLINICAL DOCUMENTATION (OUTPATIENT)
Dept: SPIRITUAL SERVICES | Age: 48
End: 2018-07-24

## 2018-07-30 ENCOUNTER — TELEPHONE (OUTPATIENT)
Dept: SURGERY | Age: 48
End: 2018-07-30

## 2018-07-30 NOTE — TELEPHONE ENCOUNTER
Nurse from [facility] called stating Dr. Nicholson Early would like for Dr. Darlene Martinez to look at PD cath site. Patient has been having some drainage around the site and he noticed a bulge a few inches from the catheter.

## 2018-07-31 ENCOUNTER — CLINICAL DOCUMENTATION (OUTPATIENT)
Dept: SPIRITUAL SERVICES | Age: 48
End: 2018-07-31

## 2018-08-02 ENCOUNTER — OFFICE VISIT (OUTPATIENT)
Dept: SURGERY | Age: 48
End: 2018-08-02

## 2018-08-02 VITALS
HEIGHT: 69 IN | DIASTOLIC BLOOD PRESSURE: 76 MMHG | SYSTOLIC BLOOD PRESSURE: 142 MMHG | WEIGHT: 212.6 LBS | BODY MASS INDEX: 31.49 KG/M2 | HEART RATE: 87 BPM

## 2018-08-02 DIAGNOSIS — N18.5 CHRONIC RENAL FAILURE, STAGE 5 (HCC): Primary | ICD-10-CM

## 2018-08-02 PROCEDURE — 99024 POSTOP FOLLOW-UP VISIT: CPT | Performed by: SURGERY

## 2018-08-02 NOTE — PATIENT INSTRUCTIONS
Patient Education        Stopping Smoking: Care Instructions  Your Care Instructions  Cigarette smokers crave the nicotine in cigarettes. Giving it up is much harder than simply changing a habit. Your body has to stop craving the nicotine. It is hard to quit, but you can do it. There are many tools that people use to quit smoking. You may find that combining tools works best for you. There are several steps to quitting. First you get ready to quit. Then you get support to help you. After that, you learn new skills and behaviors to become a nonsmoker. For many people, a necessary step is getting and using medicine. Your doctor will help you set up the plan that best meets your needs. You may want to attend a smoking cessation program to help you quit smoking. When you choose a program, look for one that has proven success. Ask your doctor for ideas. You will greatly increase your chances of success if you take medicine as well as get counseling or join a cessation program.  Some of the changes you feel when you first quit tobacco are uncomfortable. Your body will miss the nicotine at first, and you may feel short-tempered and grumpy. You may have trouble sleeping or concentrating. Medicine can help you deal with these symptoms. You may struggle with changing your smoking habits and rituals. The last step is the tricky one: Be prepared for the smoking urge to continue for a time. This is a lot to deal with, but keep at it. You will feel better. Follow-up care is a key part of your treatment and safety. Be sure to make and go to all appointments, and call your doctor if you are having problems. It's also a good idea to know your test results and keep a list of the medicines you take. How can you care for yourself at home? · Ask your family, friends, and coworkers for support. You have a better chance of quitting if you have help and support.   · Join a support group, such as Nicotine Anonymous, for people who are nicotine. · Be prepared to keep trying. Most people are not successful the first few times they try to quit. Do not get mad at yourself if you smoke again. Make a list of things you learned and think about when you want to try again, such as next week, next month, or next year. Where can you learn more? Go to https://Twenty20.compepicewvikas.BootstrapLabs. org and sign in to your [employer] account. Enter B381 in the CatchMe! box to learn more about \"Stopping Smoking: Care Instructions. \"     If you do not have an account, please click on the \"Sign Up Now\" link. Current as of: November 29, 2017  Content Version: 11.6  © 6492-4864 [employer]. Care instructions adapted under license by [facility] ([facility]). If you have questions about a medical condition or this instruction, always ask your healthcare professional. [address] any warranty or liability for your use of this information.

## 2018-08-09 ENCOUNTER — APPOINTMENT (OUTPATIENT)
Dept: GENERAL RADIOLOGY | Age: 48
End: 2018-08-09
Payer: MEDICARE

## 2018-08-09 ENCOUNTER — HOSPITAL ENCOUNTER (EMERGENCY)
Age: 48
Discharge: HOME | End: 2018-08-10
Attending: EMERGENCY MEDICINE
Payer: MEDICARE

## 2018-08-09 DIAGNOSIS — I50.9 ACUTE ON CHRONIC CONGESTIVE HEART FAILURE, UNSPECIFIED HEART FAILURE TYPE (HCC): Primary | ICD-10-CM

## 2018-08-09 DIAGNOSIS — Z99.2 HEMODIALYSIS PATIENT (HCC): ICD-10-CM

## 2018-08-09 DIAGNOSIS — N18.5 CHRONIC RENAL FAILURE, STAGE 5 (HCC): ICD-10-CM

## 2018-08-09 LAB
A/G RATIO: 1.3 (ref 1.1–2.2)
ALBUMIN SERPL-MCNC: 4 G/DL (ref 3.4–5)
ALP BLD-CCNC: 76 U/L (ref 40–129)
ALT SERPL-CCNC: 9 U/L (ref 10–40)
ANION GAP SERPL CALCULATED.3IONS-SCNC: 14 MMOL/L (ref 3–16)
AST SERPL-CCNC: 9 U/L (ref 15–37)
BASOPHILS ABSOLUTE: 0.1 K/UL (ref 0–0.2)
BASOPHILS RELATIVE PERCENT: 0.8 %
BILIRUB SERPL-MCNC: 0.3 MG/DL (ref 0–1)
BUN BLDV-MCNC: 38 MG/DL (ref 7–20)
CALCIUM SERPL-MCNC: 9.1 MG/DL (ref 8.3–10.6)
CHLORIDE BLD-SCNC: 93 MMOL/L (ref 99–110)
CO2: 26 MMOL/L (ref 21–32)
CREAT SERPL-MCNC: 3.6 MG/DL (ref 0.9–1.3)
EOSINOPHILS ABSOLUTE: 0.4 K/UL (ref 0–0.6)
EOSINOPHILS RELATIVE PERCENT: 2.9 %
GFR AFRICAN AMERICAN: 22
GFR NON-AFRICAN AMERICAN: 18
GLOBULIN: 3 G/DL
GLUCOSE BLD-MCNC: 186 MG/DL (ref 70–99)
HCT VFR BLD CALC: 32.2 % (ref 40.5–52.5)
HEMOGLOBIN: 10.9 G/DL (ref 13.5–17.5)
LACTIC ACID: 1.1 MMOL/L (ref 0.4–2)
LYMPHOCYTES ABSOLUTE: 1 K/UL (ref 1–5.1)
LYMPHOCYTES RELATIVE PERCENT: 6.4 %
MCH RBC QN AUTO: 29.9 PG (ref 26–34)
MCHC RBC AUTO-ENTMCNC: 33.8 G/DL (ref 31–36)
MCV RBC AUTO: 88.6 FL (ref 80–100)
MONOCYTES ABSOLUTE: 0.7 K/UL (ref 0–1.3)
MONOCYTES RELATIVE PERCENT: 4.8 %
NEUTROPHILS ABSOLUTE: 13 K/UL (ref 1.7–7.7)
NEUTROPHILS RELATIVE PERCENT: 85.1 %
PDW BLD-RTO: 15.4 % (ref 12.4–15.4)
PLATELET # BLD: 343 K/UL (ref 135–450)
PMV BLD AUTO: 7.3 FL (ref 5–10.5)
POTASSIUM SERPL-SCNC: 4.1 MMOL/L (ref 3.5–5.1)
PRO-BNP: ABNORMAL PG/ML (ref 0–124)
RBC # BLD: 3.64 M/UL (ref 4.2–5.9)
SODIUM BLD-SCNC: 133 MMOL/L (ref 136–145)
TOTAL PROTEIN: 7 G/DL (ref 6.4–8.2)
WBC # BLD: 15.3 K/UL (ref 4–11)

## 2018-08-09 PROCEDURE — 94640 AIRWAY INHALATION TREATMENT: CPT

## 2018-08-09 PROCEDURE — 83605 ASSAY OF LACTIC ACID: CPT

## 2018-08-09 PROCEDURE — 71046 X-RAY EXAM CHEST 2 VIEWS: CPT

## 2018-08-09 PROCEDURE — 6360000002 HC RX W HCPCS: Performed by: NURSE PRACTITIONER

## 2018-08-09 PROCEDURE — 94664 DEMO&/EVAL PT USE INHALER: CPT

## 2018-08-09 PROCEDURE — 93005 ELECTROCARDIOGRAM TRACING: CPT | Performed by: NURSE PRACTITIONER

## 2018-08-09 PROCEDURE — 85025 COMPLETE CBC W/AUTO DIFF WBC: CPT

## 2018-08-09 PROCEDURE — 6370000000 HC RX 637 (ALT 250 FOR IP): Performed by: NURSE PRACTITIONER

## 2018-08-09 PROCEDURE — 94761 N-INVAS EAR/PLS OXIMETRY MLT: CPT

## 2018-08-09 PROCEDURE — 96375 TX/PRO/DX INJ NEW DRUG ADDON: CPT

## 2018-08-09 PROCEDURE — 83880 ASSAY OF NATRIURETIC PEPTIDE: CPT

## 2018-08-09 PROCEDURE — 99285 EMERGENCY DEPT VISIT HI MDM: CPT

## 2018-08-09 PROCEDURE — 6370000000 HC RX 637 (ALT 250 FOR IP): Performed by: PHYSICIAN ASSISTANT

## 2018-08-09 PROCEDURE — 96374 THER/PROPH/DIAG INJ IV PUSH: CPT

## 2018-08-09 PROCEDURE — 80053 COMPREHEN METABOLIC PANEL: CPT

## 2018-08-09 RX ORDER — OXYCODONE AND ACETAMINOPHEN 7.5; 325 MG/1; MG/1
1 TABLET ORAL ONCE
Status: COMPLETED | OUTPATIENT
Start: 2018-08-09 | End: 2018-08-09

## 2018-08-09 RX ORDER — HYDROCODONE BITARTRATE AND ACETAMINOPHEN 7.5; 325 MG/1; MG/1
1 TABLET ORAL ONCE
Status: DISCONTINUED | OUTPATIENT
Start: 2018-08-09 | End: 2018-08-09

## 2018-08-09 RX ORDER — IPRATROPIUM BROMIDE AND ALBUTEROL SULFATE 2.5; .5 MG/3ML; MG/3ML
1 SOLUTION RESPIRATORY (INHALATION) ONCE
Status: COMPLETED | OUTPATIENT
Start: 2018-08-09 | End: 2018-08-09

## 2018-08-09 RX ORDER — ALBUTEROL SULFATE 2.5 MG/3ML
5 SOLUTION RESPIRATORY (INHALATION) ONCE
Status: COMPLETED | OUTPATIENT
Start: 2018-08-09 | End: 2018-08-09

## 2018-08-09 RX ORDER — METHYLPREDNISOLONE SODIUM SUCCINATE 125 MG/2ML
125 INJECTION, POWDER, LYOPHILIZED, FOR SOLUTION INTRAMUSCULAR; INTRAVENOUS ONCE
Status: COMPLETED | OUTPATIENT
Start: 2018-08-09 | End: 2018-08-09

## 2018-08-09 RX ORDER — FUROSEMIDE 10 MG/ML
40 INJECTION INTRAMUSCULAR; INTRAVENOUS ONCE
Status: COMPLETED | OUTPATIENT
Start: 2018-08-09 | End: 2018-08-09

## 2018-08-09 RX ADMIN — FUROSEMIDE 40 MG: 10 INJECTION, SOLUTION INTRAMUSCULAR; INTRAVENOUS at 23:39

## 2018-08-09 RX ADMIN — ALBUTEROL SULFATE 5 MG: 2.5 SOLUTION RESPIRATORY (INHALATION) at 21:01

## 2018-08-09 RX ADMIN — METHYLPREDNISOLONE SODIUM SUCCINATE 125 MG: 125 INJECTION, POWDER, FOR SOLUTION INTRAMUSCULAR; INTRAVENOUS at 20:56

## 2018-08-09 RX ADMIN — IPRATROPIUM BROMIDE AND ALBUTEROL SULFATE 1 AMPULE: .5; 3 SOLUTION RESPIRATORY (INHALATION) at 21:01

## 2018-08-09 RX ADMIN — OXYCODONE HYDROCHLORIDE AND ACETAMINOPHEN 1 TABLET: 7.5; 325 TABLET ORAL at 23:01

## 2018-08-09 ASSESSMENT — ENCOUNTER SYMPTOMS
BLOOD IN STOOL: 0
SHORTNESS OF BREATH: 1
DIARRHEA: 0
NAUSEA: 0
CHEST TIGHTNESS: 1
COUGH: 1
ABDOMINAL PAIN: 0
ABDOMINAL DISTENTION: 0
WHEEZING: 1
VOMITING: 0
BACK PAIN: 0
ALLERGIC/IMMUNOLOGIC NEGATIVE: 1
CONSTIPATION: 0
EYES NEGATIVE: 1

## 2018-08-09 ASSESSMENT — PAIN DESCRIPTION - LOCATION: LOCATION: BACK

## 2018-08-09 ASSESSMENT — PAIN DESCRIPTION - PAIN TYPE: TYPE: CHRONIC PAIN

## 2018-08-09 ASSESSMENT — PAIN SCALES - GENERAL
PAINLEVEL_OUTOF10: 8
PAINLEVEL_OUTOF10: 8

## 2018-08-10 VITALS
SYSTOLIC BLOOD PRESSURE: 149 MMHG | DIASTOLIC BLOOD PRESSURE: 79 MMHG | OXYGEN SATURATION: 95 % | WEIGHT: 209 LBS | HEIGHT: 69 IN | RESPIRATION RATE: 22 BRPM | BODY MASS INDEX: 30.96 KG/M2 | TEMPERATURE: 98.8 F | HEART RATE: 88 BPM

## 2018-08-10 LAB
EKG ATRIAL RATE: 89 BPM
EKG DIAGNOSIS: NORMAL
EKG P AXIS: -23 DEGREES
EKG P-R INTERVAL: 150 MS
EKG Q-T INTERVAL: 394 MS
EKG QRS DURATION: 94 MS
EKG QTC CALCULATION (BAZETT): 479 MS
EKG R AXIS: 18 DEGREES
EKG T AXIS: 60 DEGREES
EKG VENTRICULAR RATE: 89 BPM

## 2018-08-10 PROCEDURE — 93010 ELECTROCARDIOGRAM REPORT: CPT | Performed by: INTERNAL MEDICINE

## 2018-08-10 NOTE — ED NOTES
Pt ambulated approx 30ft with pulse ox. SpO2 ranged between 92-95 and pulse ranged between 90-96. RN notified.        Heike Sung  08/09/18 [id number]

## 2018-08-10 NOTE — ED NOTES
Attempted to lay patient back and see how his oxygen did. Pt did not tolerate well at all; immediately asked to be raised back up. Pt family states he never lays down and that he sleeps in a raised position.      Alvaro Carlos RN  08/09/18 [id number]

## 2018-08-10 NOTE — ED NOTES
Bed: 07  Expected date:   Expected time:   Means of arrival:   Comments:  [address], [facility]  08/09/18 2023

## 2018-08-10 NOTE — ED PROVIDER NOTES
[address] [facility]  ED  eMERGENCY dEPARTMENT eNCOUnter        Pt Name: Nick Pacheco  MRN: [id number]  [place] 1970  Date of evaluation: 8/9/2018  Provider: JAY Mcleod Asa - CNP  PCP: Amy Horvath MD  ED Attending: Zainab Harding DO    CHIEF COMPLAINT       Chief Complaint   Patient presents with    Shortness of Breath     started yesterday. States worse when lying down        HISTORY OF PRESENT ILLNESS   (Location/Symptom, Timing/Onset, Context/Setting, Quality, Duration, Modifying Factors, Severity)  Note limiting factors. Nick Pacheco is a 46 y.o. male who presents to the ED with shortness of breath and productive cough since yesterday. States a history of COPD and CHF, but states this is much worse than normal and he is unable to even take a few steps without being short of breath. States he was at dialysis today and the NP prescribed him Doxy for his cough but has only taken one dose of the medication. Patient denies any fevers, nausea, vomiting, chest pain, or abdominal pain. Rates pain a 8/10. Patient states he is a hemodialysis patient with the last treatment today and is supposed to start peritoneal dialysis tomorrow. Nursing Notes were all reviewed and agreed with or any disagreements were addressed  in the HPI. REVIEW OF SYSTEMS    (2-9 systems for level 4, 10 or more for level 5)     Review of Systems   Constitutional: Positive for fatigue. Negative for activity change, appetite change, chills, diaphoresis and fever. HENT: Negative. Eyes: Negative. Respiratory: Positive for cough, chest tightness, shortness of breath and wheezing. Cardiovascular: Negative for chest pain, palpitations and leg swelling. Gastrointestinal: Negative for abdominal distention, abdominal pain, blood in stool, constipation, diarrhea, nausea and vomiting. Endocrine: Negative. Genitourinary: Negative for dysuria, flank pain and hematuria.    Musculoskeletal: DIALYSIS FISTULA CREATION Left 10/30/2017    LEFT BRACHIAL CEPHALIC FISTULA    OTHER SURGICAL HISTORY  02/01/2017    laparoscopic cholecystectomy with intraoperative cholangiogram    OTHER SURGICAL HISTORY  2018    PORT PLACEMENT  - vas cath    OTHER SURGICAL HISTORY Bilateral 06/26/2018    laprascopic peritoneal dialysis catheter placement    TONSILLECTOMY      UPPER GASTROINTESTINAL ENDOSCOPY  01/06/2016    UPPER GASTROINTESTINAL ENDOSCOPY  01/29/2017    possible candida, otherwise normal appearing    VASCULAR SURGERY  aprx 2 years ago    2 stents placed, each side of groin         CURRENT MEDICATIONS       Previous Medications    ACCU-CHEK FASTCLIX LANCETS MISC    TEST 3-4 TIMES DAILY, AS DIRECTED    ALBUTEROL SULFATE  (90 BASE) MCG/ACT INHALER    Inhale 2 puffs into the lungs every 6 hours as needed for Wheezing    ALCOHOL SWABS PADS    USE AS DIRECTED    ASPIRIN 81 MG CHEWABLE TABLET    Take 1 tablet by mouth daily. ATORVASTATIN (LIPITOR) 40 MG TABLET    TK 1 T PO D    B COMPLEX-C-FOLIC ACID (VIRT-CAPS) 1 MG CAPS    TK ONE C PO  QD    BLOOD GLUCOSE MONITORING SUPPL ADAM    USE AS DIRECTED. CALCIUM CARBONATE (TUMS) 500 MG CHEWABLE TABLET    Take 1 tablet by mouth 3 times daily as needed for Heartburn. CARVEDILOL (COREG) 12.5 MG TABLET    Take 1 tablet by mouth 2 times daily (with meals)    CITALOPRAM (CELEXA) 20 MG TABLET    TAKE 1 TABLET EVERY DAY    CLOPIDOGREL (PLAVIX) 75 MG TABLET    TAKE 1 TABLET EVERY DAY    CYCLOBENZAPRINE (FLEXERIL) 10 MG TABLET    TK 1 T PO Q 8 H PRN    DILTIAZEM (CARTIA XT) 180 MG EXTENDED RELEASE CAPSULE    TAKE 1 CAPSULE EVERY DAY    GABAPENTIN (NEURONTIN) 100 MG CAPSULE    Take 2 capsules by mouth 3 times daily    GLUCOSE BLOOD (BLOOD GLUCOSE TEST STRIPS) STRP    TEST 3-4 TIMES DAILY, AS DIRECTED    GLUCOSE BLOOD VI TEST STRIPS (FREESTYLE LITE) STRIP    Daily As needed.     GLUCOSE MONITORING KIT (FREESTYLE) MONITORING KIT    1 kit by Does not apply route daily HYDROXYZINE (VISTARIL) 50 MG CAPSULE    TAKE 1 TO 2 CAPSULES EVERY NIGHT    INSULIN SYRINGES, DISPOSABLE, U-100 1 ML MISC    1 each by Does not apply route daily    IPRATROPIUM-ALBUTEROL (DUONEB) 0.5-2.5 (3) MG/3ML SOLN NEBULIZER SOLUTION    Inhale 3 mLs into the lungs every 6 hours as needed for Shortness of Breath    ISOSORBIDE MONONITRATE (IMDUR) 30 MG EXTENDED RELEASE TABLET    TAKE 1 TABLET EVERY DAY    LANCETS MISC    Test daily    MAGNESIUM HYDROXIDE (MILK OF MAGNESIA PO)        NITROGLYCERIN (NITROSTAT) 0.4 MG SL TABLET    Place 1 tablet under the tongue every 5 minutes as needed for Chest pain    PEDIATRIC MULTIVITAMINS-FL (MULTI VIT/FL) 0.25 MG CHEW        POLYETHYLENE GLYCOL 3350 GRAN        PRAVASTATIN (PRAVACHOL) 80 MG TABLET    Take 1 tablet by mouth daily    TORSEMIDE (DEMADEX) 100 MG TABLET    Take 1 tablet by mouth daily    TRAZODONE (DESYREL) 150 MG TABLET    Take 1 tablet by mouth nightly         ALLERGIES     Morphine    FAMILY HISTORY       Family History   Problem Relation Age of Onset    Diabetes Mother     Heart Disease Father     Kidney Disease Sister     Cancer Sister     Heart Disease Sister     Obesity Sister     Cancer Sister     Heart Disease Sister     Obesity Sister           SOCIAL HISTORY       Social History     Social History    Marital status:      Spouse name: N/A    Number of children: N/A    Years of education: N/A     Social History Main Topics    Smoking status: Current Every Day Smoker     Packs/day: 0.25     Years: 33.00     Types: Cigarettes    Smokeless tobacco: Never Used      Comment: TRYING TO QUIT    Alcohol use No    Drug use: No    Sexual activity: Yes     Partners: Female      Comment:      Other Topics Concern    None     Social History Narrative    None       SCREENINGS             PHYSICAL EXAM    (up to 7 for level 4, 8 or more for level 5)     ED Triage Vitals [08/09/18 2026]   BP Temp Temp src Pulse Resp SpO2 Height Weight AST 9 (*)     All other components within normal limits    Narrative:     Performed at:  [facility]) - [address],  [place], [address] [employer]   Phone [phone] - Abnormal; Notable for the following:     Pro-BNP 30,704 (*)     All other components within normal limits    Narrative:     Performed at:  [facility]) - [address], [address]   Phone [phone]   LACTIC ACID, PLASMA    Narrative:     Performed at:  [facility]) - [address], [facility]   Phone [phone]       All other labs were within normal range or not returned as of this dictation. EKG: All EKG's are interpreted by the Emergency Department Physician who either signs or Co-signs this chart in the absence of a cardiologist.  Please see their note for interpretation of EKG. RADIOLOGY:   Non-plain film images such as CT, Ultrasound and MRI are read by the radiologist. Plain radiographic images are visualized and preliminarily interpreted by the  ED Provider with the below findings:        Interpretation per the Radiologist below, if available at the time of this note:    XR CHEST STANDARD (2 VW)   Final Result   Pulmonary vascular congestion along with hazy opacity within the mid to lower   lungs suggests possible pulmonary edema. Correlate with any clinical   evidence of superimposed pneumonia. No results found.       PROCEDURES   Unless otherwise noted below, none     Procedures    CRITICAL CARE TIME   N/A    CONSULTS:  IP CONSULT TO NEPHROLOGY      EMERGENCY DEPARTMENT COURSE and DIFFERENTIAL DIAGNOSIS/MDM:   Vitals:    Vitals:    08/09/18 2026 08/09/18 2101 08/09/18 2201 08/09/18 2234   BP: (!) 138/104  138/71    Pulse: 93  88    Resp: 22 23 22 22   Temp: 98.8 °F (37.1 °C)      SpO2: 94% 95% 94% 93%   Weight: 209 lb (94.8 kg)      Height: 5' 9\" (1.753 m)

## 2018-08-24 ENCOUNTER — CLINICAL DOCUMENTATION (OUTPATIENT)
Dept: SPIRITUAL SERVICES | Age: 48
End: 2018-08-24

## 2018-09-14 ENCOUNTER — APPOINTMENT (OUTPATIENT)
Dept: GENERAL RADIOLOGY | Age: 48
DRG: 981 | End: 2018-09-14
Payer: MEDICARE

## 2018-09-14 ENCOUNTER — HOSPITAL ENCOUNTER (INPATIENT)
Age: 48
LOS: 11 days | Discharge: HOME HEALTH | DRG: 981 | End: 2018-09-25
Attending: EMERGENCY MEDICINE | Admitting: INTERNAL MEDICINE
Payer: MEDICARE

## 2018-09-14 DIAGNOSIS — Z99.2 ESRD ON PERITONEAL DIALYSIS (HCC): ICD-10-CM

## 2018-09-14 DIAGNOSIS — I50.9 ACUTE ON CHRONIC CONGESTIVE HEART FAILURE, UNSPECIFIED HEART FAILURE TYPE (HCC): Primary | ICD-10-CM

## 2018-09-14 DIAGNOSIS — N18.6 ESRD ON PERITONEAL DIALYSIS (HCC): ICD-10-CM

## 2018-09-14 PROBLEM — I50.41 ACUTE COMBINED SYSTOLIC AND DIASTOLIC CHF, NYHA CLASS 4 (HCC): Status: ACTIVE | Noted: 2018-09-14

## 2018-09-14 LAB
A/G RATIO: 1.3 (ref 1.1–2.2)
ALBUMIN SERPL-MCNC: 3.5 G/DL (ref 3.4–5)
ALP BLD-CCNC: 65 U/L (ref 40–129)
ALT SERPL-CCNC: 15 U/L (ref 10–40)
ANION GAP SERPL CALCULATED.3IONS-SCNC: 12 MMOL/L (ref 3–16)
AST SERPL-CCNC: 9 U/L (ref 15–37)
BASOPHILS ABSOLUTE: 0.1 K/UL (ref 0–0.2)
BASOPHILS RELATIVE PERCENT: 0.5 %
BILIRUB SERPL-MCNC: 0.5 MG/DL (ref 0–1)
BUN BLDV-MCNC: 64 MG/DL (ref 7–20)
CALCIUM SERPL-MCNC: 8.8 MG/DL (ref 8.3–10.6)
CHLORIDE BLD-SCNC: 97 MMOL/L (ref 99–110)
CO2: 28 MMOL/L (ref 21–32)
CREAT SERPL-MCNC: 3.2 MG/DL (ref 0.9–1.3)
EOSINOPHILS ABSOLUTE: 0.4 K/UL (ref 0–0.6)
EOSINOPHILS RELATIVE PERCENT: 2.2 %
GFR AFRICAN AMERICAN: 25
GFR NON-AFRICAN AMERICAN: 21
GLOBULIN: 2.7 G/DL
GLUCOSE BLD-MCNC: 225 MG/DL (ref 70–99)
HCT VFR BLD CALC: 35.4 % (ref 40.5–52.5)
HEMOGLOBIN: 11.6 G/DL (ref 13.5–17.5)
LYMPHOCYTES ABSOLUTE: 1.4 K/UL (ref 1–5.1)
LYMPHOCYTES RELATIVE PERCENT: 8.8 %
MCH RBC QN AUTO: 29.3 PG (ref 26–34)
MCHC RBC AUTO-ENTMCNC: 32.9 G/DL (ref 31–36)
MCV RBC AUTO: 89.1 FL (ref 80–100)
MONOCYTES ABSOLUTE: 0.8 K/UL (ref 0–1.3)
MONOCYTES RELATIVE PERCENT: 5.1 %
NEUTROPHILS ABSOLUTE: 13.3 K/UL (ref 1.7–7.7)
NEUTROPHILS RELATIVE PERCENT: 83.4 %
PDW BLD-RTO: 15.5 % (ref 12.4–15.4)
PLATELET # BLD: 376 K/UL (ref 135–450)
PMV BLD AUTO: 7.4 FL (ref 5–10.5)
POTASSIUM SERPL-SCNC: 3.3 MMOL/L (ref 3.5–5.1)
PRO-BNP: ABNORMAL PG/ML (ref 0–124)
RBC # BLD: 3.97 M/UL (ref 4.2–5.9)
SODIUM BLD-SCNC: 137 MMOL/L (ref 136–145)
TOTAL PROTEIN: 6.2 G/DL (ref 6.4–8.2)
TROPONIN: 0.27 NG/ML
WBC # BLD: 15.9 K/UL (ref 4–11)

## 2018-09-14 PROCEDURE — 6370000000 HC RX 637 (ALT 250 FOR IP): Performed by: INTERNAL MEDICINE

## 2018-09-14 PROCEDURE — 83880 ASSAY OF NATRIURETIC PEPTIDE: CPT

## 2018-09-14 PROCEDURE — 93005 ELECTROCARDIOGRAM TRACING: CPT | Performed by: EMERGENCY MEDICINE

## 2018-09-14 PROCEDURE — 99285 EMERGENCY DEPT VISIT HI MDM: CPT

## 2018-09-14 PROCEDURE — 96374 THER/PROPH/DIAG INJ IV PUSH: CPT

## 2018-09-14 PROCEDURE — 6360000002 HC RX W HCPCS: Performed by: EMERGENCY MEDICINE

## 2018-09-14 PROCEDURE — 84484 ASSAY OF TROPONIN QUANT: CPT

## 2018-09-14 PROCEDURE — 1200000000 HC SEMI PRIVATE

## 2018-09-14 PROCEDURE — 36415 COLL VENOUS BLD VENIPUNCTURE: CPT

## 2018-09-14 PROCEDURE — 71046 X-RAY EXAM CHEST 2 VIEWS: CPT

## 2018-09-14 PROCEDURE — 85025 COMPLETE CBC W/AUTO DIFF WBC: CPT

## 2018-09-14 PROCEDURE — 80053 COMPREHEN METABOLIC PANEL: CPT

## 2018-09-14 RX ORDER — ONDANSETRON 2 MG/ML
4 INJECTION INTRAMUSCULAR; INTRAVENOUS EVERY 6 HOURS PRN
Status: DISCONTINUED | OUTPATIENT
Start: 2018-09-14 | End: 2018-09-25 | Stop reason: HOSPADM

## 2018-09-14 RX ORDER — HEPARIN SODIUM 5000 [USP'U]/ML
5000 INJECTION, SOLUTION INTRAVENOUS; SUBCUTANEOUS EVERY 8 HOURS SCHEDULED
Status: DISCONTINUED | OUTPATIENT
Start: 2018-09-14 | End: 2018-09-25 | Stop reason: HOSPADM

## 2018-09-14 RX ORDER — NICOTINE POLACRILEX 4 MG
15 LOZENGE BUCCAL PRN
Status: DISCONTINUED | OUTPATIENT
Start: 2018-09-14 | End: 2018-09-25 | Stop reason: HOSPADM

## 2018-09-14 RX ORDER — ACETAMINOPHEN 325 MG/1
650 TABLET ORAL EVERY 4 HOURS PRN
Status: DISCONTINUED | OUTPATIENT
Start: 2018-09-14 | End: 2018-09-17

## 2018-09-14 RX ORDER — IPRATROPIUM BROMIDE AND ALBUTEROL SULFATE 2.5; .5 MG/3ML; MG/3ML
1 SOLUTION RESPIRATORY (INHALATION) EVERY 6 HOURS PRN
Status: DISCONTINUED | OUTPATIENT
Start: 2018-09-14 | End: 2018-09-15

## 2018-09-14 RX ORDER — CALCIUM CARBONATE 200(500)MG
1 TABLET,CHEWABLE ORAL 3 TIMES DAILY PRN
Status: DISCONTINUED | OUTPATIENT
Start: 2018-09-14 | End: 2018-09-25 | Stop reason: HOSPADM

## 2018-09-14 RX ORDER — ALBUTEROL SULFATE 90 UG/1
2 AEROSOL, METERED RESPIRATORY (INHALATION) EVERY 6 HOURS PRN
Status: DISCONTINUED | OUTPATIENT
Start: 2018-09-14 | End: 2018-09-25 | Stop reason: HOSPADM

## 2018-09-14 RX ORDER — ATORVASTATIN CALCIUM 40 MG/1
40 TABLET, FILM COATED ORAL NIGHTLY
Status: DISCONTINUED | OUTPATIENT
Start: 2018-09-14 | End: 2018-09-17

## 2018-09-14 RX ORDER — CITALOPRAM 20 MG/1
20 TABLET ORAL DAILY
Status: DISCONTINUED | OUTPATIENT
Start: 2018-09-15 | End: 2018-09-25 | Stop reason: HOSPADM

## 2018-09-14 RX ORDER — OXYCODONE AND ACETAMINOPHEN 7.5; 325 MG/1; MG/1
1 TABLET ORAL EVERY 4 HOURS PRN
Status: ON HOLD | COMMUNITY
End: 2020-05-05 | Stop reason: SDUPTHER

## 2018-09-14 RX ORDER — FUROSEMIDE 10 MG/ML
60 INJECTION INTRAMUSCULAR; INTRAVENOUS 2 TIMES DAILY
Status: DISCONTINUED | OUTPATIENT
Start: 2018-09-15 | End: 2018-09-19

## 2018-09-14 RX ORDER — SODIUM CHLORIDE 0.9 % (FLUSH) 0.9 %
10 SYRINGE (ML) INJECTION EVERY 12 HOURS SCHEDULED
Status: DISCONTINUED | OUTPATIENT
Start: 2018-09-14 | End: 2018-09-25 | Stop reason: HOSPADM

## 2018-09-14 RX ORDER — DEXTROSE MONOHYDRATE 50 MG/ML
100 INJECTION, SOLUTION INTRAVENOUS PRN
Status: DISCONTINUED | OUTPATIENT
Start: 2018-09-14 | End: 2018-09-25 | Stop reason: HOSPADM

## 2018-09-14 RX ORDER — ISOSORBIDE MONONITRATE 30 MG/1
30 TABLET, EXTENDED RELEASE ORAL DAILY
Status: DISCONTINUED | OUTPATIENT
Start: 2018-09-15 | End: 2018-09-25 | Stop reason: HOSPADM

## 2018-09-14 RX ORDER — CARVEDILOL 6.25 MG/1
12.5 TABLET ORAL 2 TIMES DAILY WITH MEALS
Status: DISCONTINUED | OUTPATIENT
Start: 2018-09-15 | End: 2018-09-16

## 2018-09-14 RX ORDER — TRAZODONE HYDROCHLORIDE 50 MG/1
150 TABLET ORAL NIGHTLY
Status: DISCONTINUED | OUTPATIENT
Start: 2018-09-14 | End: 2018-09-25 | Stop reason: HOSPADM

## 2018-09-14 RX ORDER — ASPIRIN 81 MG/1
81 TABLET, CHEWABLE ORAL DAILY
Status: DISCONTINUED | OUTPATIENT
Start: 2018-09-15 | End: 2018-09-18

## 2018-09-14 RX ORDER — FUROSEMIDE 10 MG/ML
40 INJECTION INTRAMUSCULAR; INTRAVENOUS ONCE
Status: COMPLETED | OUTPATIENT
Start: 2018-09-14 | End: 2018-09-14

## 2018-09-14 RX ORDER — DILTIAZEM HYDROCHLORIDE 180 MG/1
180 CAPSULE, COATED, EXTENDED RELEASE ORAL DAILY
Status: DISCONTINUED | OUTPATIENT
Start: 2018-09-15 | End: 2018-09-20

## 2018-09-14 RX ORDER — SODIUM CHLORIDE 0.9 % (FLUSH) 0.9 %
10 SYRINGE (ML) INJECTION PRN
Status: DISCONTINUED | OUTPATIENT
Start: 2018-09-14 | End: 2018-09-25 | Stop reason: HOSPADM

## 2018-09-14 RX ORDER — DEXTROSE MONOHYDRATE 25 G/50ML
12.5 INJECTION, SOLUTION INTRAVENOUS PRN
Status: DISCONTINUED | OUTPATIENT
Start: 2018-09-14 | End: 2018-09-25 | Stop reason: HOSPADM

## 2018-09-14 RX ORDER — CLOPIDOGREL BISULFATE 75 MG/1
75 TABLET ORAL DAILY
Status: DISCONTINUED | OUTPATIENT
Start: 2018-09-15 | End: 2018-09-17

## 2018-09-14 RX ORDER — NITROGLYCERIN 0.4 MG/1
0.4 TABLET SUBLINGUAL EVERY 5 MIN PRN
Status: DISCONTINUED | OUTPATIENT
Start: 2018-09-14 | End: 2018-09-25 | Stop reason: HOSPADM

## 2018-09-14 RX ADMIN — FUROSEMIDE 40 MG: 10 INJECTION, SOLUTION INTRAMUSCULAR; INTRAVENOUS at 22:56

## 2018-09-14 ASSESSMENT — PAIN SCALES - GENERAL: PAINLEVEL_OUTOF10: 9

## 2018-09-15 LAB
ANION GAP SERPL CALCULATED.3IONS-SCNC: 12 MMOL/L (ref 3–16)
BASOPHILS ABSOLUTE: 0.1 K/UL (ref 0–0.2)
BASOPHILS RELATIVE PERCENT: 0.4 %
BUN BLDV-MCNC: 63 MG/DL (ref 7–20)
CALCIUM SERPL-MCNC: 8.3 MG/DL (ref 8.3–10.6)
CHLORIDE BLD-SCNC: 101 MMOL/L (ref 99–110)
CO2: 27 MMOL/L (ref 21–32)
CREAT SERPL-MCNC: 3.2 MG/DL (ref 0.9–1.3)
EOSINOPHILS ABSOLUTE: 0.4 K/UL (ref 0–0.6)
EOSINOPHILS RELATIVE PERCENT: 2.8 %
GFR AFRICAN AMERICAN: 25
GFR NON-AFRICAN AMERICAN: 21
GLUCOSE BLD-MCNC: 274 MG/DL (ref 70–99)
HCT VFR BLD CALC: 33.1 % (ref 40.5–52.5)
HEMOGLOBIN: 11.1 G/DL (ref 13.5–17.5)
LV EF: 43 %
LVEF MODALITY: NORMAL
LYMPHOCYTES ABSOLUTE: 1.3 K/UL (ref 1–5.1)
LYMPHOCYTES RELATIVE PERCENT: 9.5 %
MAGNESIUM: 2.3 MG/DL (ref 1.8–2.4)
MCH RBC QN AUTO: 30.1 PG (ref 26–34)
MCHC RBC AUTO-ENTMCNC: 33.4 G/DL (ref 31–36)
MCV RBC AUTO: 90.1 FL (ref 80–100)
MONOCYTES ABSOLUTE: 1.1 K/UL (ref 0–1.3)
MONOCYTES RELATIVE PERCENT: 8.1 %
NEUTROPHILS ABSOLUTE: 10.8 K/UL (ref 1.7–7.7)
NEUTROPHILS RELATIVE PERCENT: 79.2 %
PDW BLD-RTO: 15.5 % (ref 12.4–15.4)
PLATELET # BLD: 326 K/UL (ref 135–450)
PMV BLD AUTO: 7.9 FL (ref 5–10.5)
POTASSIUM REFLEX MAGNESIUM: 3.2 MMOL/L (ref 3.5–5.1)
RBC # BLD: 3.68 M/UL (ref 4.2–5.9)
SODIUM BLD-SCNC: 140 MMOL/L (ref 136–145)
TROPONIN: 0.24 NG/ML
TROPONIN: 0.26 NG/ML
WBC # BLD: 13.6 K/UL (ref 4–11)

## 2018-09-15 PROCEDURE — 6370000000 HC RX 637 (ALT 250 FOR IP): Performed by: INTERNAL MEDICINE

## 2018-09-15 PROCEDURE — 94640 AIRWAY INHALATION TREATMENT: CPT

## 2018-09-15 PROCEDURE — 94664 DEMO&/EVAL PT USE INHALER: CPT

## 2018-09-15 PROCEDURE — 85025 COMPLETE CBC W/AUTO DIFF WBC: CPT

## 2018-09-15 PROCEDURE — 83735 ASSAY OF MAGNESIUM: CPT

## 2018-09-15 PROCEDURE — 6360000004 HC RX CONTRAST MEDICATION: Performed by: INTERNAL MEDICINE

## 2018-09-15 PROCEDURE — 6360000002 HC RX W HCPCS: Performed by: INTERNAL MEDICINE

## 2018-09-15 PROCEDURE — 99223 1ST HOSP IP/OBS HIGH 75: CPT | Performed by: INTERNAL MEDICINE

## 2018-09-15 PROCEDURE — G0008 ADMIN INFLUENZA VIRUS VAC: HCPCS | Performed by: INTERNAL MEDICINE

## 2018-09-15 PROCEDURE — 2580000003 HC RX 258: Performed by: INTERNAL MEDICINE

## 2018-09-15 PROCEDURE — 84484 ASSAY OF TROPONIN QUANT: CPT

## 2018-09-15 PROCEDURE — 93010 ELECTROCARDIOGRAM REPORT: CPT | Performed by: INTERNAL MEDICINE

## 2018-09-15 PROCEDURE — 90686 IIV4 VACC NO PRSV 0.5 ML IM: CPT | Performed by: INTERNAL MEDICINE

## 2018-09-15 PROCEDURE — 2060000000 HC ICU INTERMEDIATE R&B

## 2018-09-15 PROCEDURE — C8929 TTE W OR WO FOL WCON,DOPPLER: HCPCS | Performed by: INTERNAL MEDICINE

## 2018-09-15 PROCEDURE — 94150 VITAL CAPACITY TEST: CPT

## 2018-09-15 PROCEDURE — 80048 BASIC METABOLIC PNL TOTAL CA: CPT

## 2018-09-15 PROCEDURE — 36415 COLL VENOUS BLD VENIPUNCTURE: CPT

## 2018-09-15 RX ORDER — ZOLPIDEM TARTRATE 5 MG/1
5 TABLET ORAL ONCE
Status: COMPLETED | OUTPATIENT
Start: 2018-09-15 | End: 2018-09-15

## 2018-09-15 RX ORDER — OXYCODONE AND ACETAMINOPHEN 7.5; 325 MG/1; MG/1
1 TABLET ORAL EVERY 4 HOURS PRN
Status: DISCONTINUED | OUTPATIENT
Start: 2018-09-15 | End: 2018-09-25 | Stop reason: HOSPADM

## 2018-09-15 RX ORDER — SODIUM CHLORIDE, SODIUM LACTATE, CALCIUM CHLORIDE, MAGNESIUM CHLORIDE AND DEXTROSE 2.5; 538; 448; 18.3; 5.08 G/100ML; MG/100ML; MG/100ML; MG/100ML; MG/100ML
INJECTION, SOLUTION INTRAPERITONEAL EVERY 4 HOURS
Status: DISCONTINUED | OUTPATIENT
Start: 2018-09-15 | End: 2018-09-18

## 2018-09-15 RX ORDER — ZOLPIDEM TARTRATE 5 MG/1
5 TABLET ORAL NIGHTLY PRN
Status: ON HOLD | COMMUNITY
End: 2018-09-25 | Stop reason: HOSPADM

## 2018-09-15 RX ORDER — IPRATROPIUM BROMIDE AND ALBUTEROL SULFATE 2.5; .5 MG/3ML; MG/3ML
1 SOLUTION RESPIRATORY (INHALATION) EVERY 4 HOURS
Status: DISCONTINUED | OUTPATIENT
Start: 2018-09-15 | End: 2018-09-16

## 2018-09-15 RX ORDER — OXYCODONE AND ACETAMINOPHEN 7.5; 325 MG/1; MG/1
1 TABLET ORAL EVERY 8 HOURS PRN
Status: DISCONTINUED | OUTPATIENT
Start: 2018-09-15 | End: 2018-09-15

## 2018-09-15 RX ADMIN — IPRATROPIUM BROMIDE AND ALBUTEROL SULFATE 3 ML: .5; 3 SOLUTION RESPIRATORY (INHALATION) at 08:23

## 2018-09-15 RX ADMIN — CITALOPRAM HYDROBROMIDE 20 MG: 20 TABLET ORAL at 10:53

## 2018-09-15 RX ADMIN — OXYCODONE HYDROCHLORIDE AND ACETAMINOPHEN 1 TABLET: 7.5; 325 TABLET ORAL at 09:31

## 2018-09-15 RX ADMIN — TRAZODONE HYDROCHLORIDE 150 MG: 50 TABLET ORAL at 22:24

## 2018-09-15 RX ADMIN — CARVEDILOL 12.5 MG: 6.25 TABLET, FILM COATED ORAL at 10:54

## 2018-09-15 RX ADMIN — IPRATROPIUM BROMIDE AND ALBUTEROL SULFATE 3 ML: .5; 3 SOLUTION RESPIRATORY (INHALATION) at 19:53

## 2018-09-15 RX ADMIN — IPRATROPIUM BROMIDE AND ALBUTEROL SULFATE 3 ML: .5; 3 SOLUTION RESPIRATORY (INHALATION) at 16:06

## 2018-09-15 RX ADMIN — IPRATROPIUM BROMIDE AND ALBUTEROL SULFATE 3 ML: .5; 3 SOLUTION RESPIRATORY (INHALATION) at 03:47

## 2018-09-15 RX ADMIN — ASPIRIN 81 MG 81 MG: 81 TABLET ORAL at 10:54

## 2018-09-15 RX ADMIN — Medication 10 ML: at 00:52

## 2018-09-15 RX ADMIN — FUROSEMIDE 60 MG: 10 INJECTION, SOLUTION INTRAMUSCULAR; INTRAVENOUS at 19:22

## 2018-09-15 RX ADMIN — ISOSORBIDE MONONITRATE 30 MG: 30 TABLET, EXTENDED RELEASE ORAL at 10:53

## 2018-09-15 RX ADMIN — ZOLPIDEM TARTRATE 5 MG: 5 TABLET ORAL at 00:50

## 2018-09-15 RX ADMIN — OXYCODONE HYDROCHLORIDE AND ACETAMINOPHEN 1 TABLET: 7.5; 325 TABLET ORAL at 22:24

## 2018-09-15 RX ADMIN — FUROSEMIDE 60 MG: 10 INJECTION, SOLUTION INTRAMUSCULAR; INTRAVENOUS at 10:57

## 2018-09-15 RX ADMIN — OXYCODONE HYDROCHLORIDE AND ACETAMINOPHEN 1 TABLET: 7.5; 325 TABLET ORAL at 00:50

## 2018-09-15 RX ADMIN — PERFLUTREN 2.42 MG: 6.52 INJECTION, SUSPENSION INTRAVENOUS at 11:44

## 2018-09-15 RX ADMIN — OXYCODONE HYDROCHLORIDE AND ACETAMINOPHEN 1 TABLET: 7.5; 325 TABLET ORAL at 15:56

## 2018-09-15 RX ADMIN — ATORVASTATIN CALCIUM 40 MG: 40 TABLET, FILM COATED ORAL at 22:24

## 2018-09-15 RX ADMIN — IPRATROPIUM BROMIDE AND ALBUTEROL SULFATE 3 ML: .5; 3 SOLUTION RESPIRATORY (INHALATION) at 23:47

## 2018-09-15 RX ADMIN — CLOPIDOGREL BISULFATE 75 MG: 75 TABLET ORAL at 10:54

## 2018-09-15 RX ADMIN — TRAZODONE HYDROCHLORIDE 150 MG: 50 TABLET ORAL at 00:50

## 2018-09-15 RX ADMIN — INFLUENZA A VIRUS A/MICHIGAN/45/2015 X-275 (H1N1) ANTIGEN (FORMALDEHYDE INACTIVATED), INFLUENZA A VIRUS A/SINGAPORE/INFIMH-16-0019/2016 IVR-186 (H3N2) ANTIGEN (FORMALDEHYDE INACTIVATED), INFLUENZA B VIRUS B/PHUKET/3073/2013 ANTIGEN (FORMALDEHYDE INACTIVATED), AND INFLUENZA B VIRUS B/MARYLAND/15/2016 BX-69A ANTIGEN (FORMALDEHYDE INACTIVATED) 0.5 ML: 15; 15; 15; 15 INJECTION, SUSPENSION INTRAMUSCULAR at 11:46

## 2018-09-15 RX ADMIN — HEPARIN SODIUM 5000 UNITS: 5000 INJECTION INTRAVENOUS; SUBCUTANEOUS at 14:49

## 2018-09-15 RX ADMIN — CALCIUM CARBONATE 500 MG: 500 TABLET, CHEWABLE ORAL at 23:32

## 2018-09-15 RX ADMIN — HEPARIN SODIUM 5000 UNITS: 5000 INJECTION INTRAVENOUS; SUBCUTANEOUS at 06:23

## 2018-09-15 RX ADMIN — IPRATROPIUM BROMIDE AND ALBUTEROL SULFATE 3 ML: .5; 3 SOLUTION RESPIRATORY (INHALATION) at 00:19

## 2018-09-15 RX ADMIN — CARVEDILOL 12.5 MG: 6.25 TABLET, FILM COATED ORAL at 19:22

## 2018-09-15 RX ADMIN — Medication 10 ML: at 22:25

## 2018-09-15 RX ADMIN — DILTIAZEM HYDROCHLORIDE 180 MG: 180 CAPSULE, COATED, EXTENDED RELEASE ORAL at 10:54

## 2018-09-15 RX ADMIN — Medication 10 ML: at 10:58

## 2018-09-15 RX ADMIN — ATORVASTATIN CALCIUM 40 MG: 40 TABLET, FILM COATED ORAL at 00:50

## 2018-09-15 RX ADMIN — IPRATROPIUM BROMIDE AND ALBUTEROL SULFATE 3 ML: .5; 3 SOLUTION RESPIRATORY (INHALATION) at 12:08

## 2018-09-15 RX ADMIN — HEPARIN SODIUM 5000 UNITS: 5000 INJECTION INTRAVENOUS; SUBCUTANEOUS at 22:30

## 2018-09-15 ASSESSMENT — PAIN DESCRIPTION - ONSET: ONSET: PROGRESSIVE

## 2018-09-15 ASSESSMENT — PAIN DESCRIPTION - PAIN TYPE
TYPE: CHRONIC PAIN

## 2018-09-15 ASSESSMENT — PAIN DESCRIPTION - ORIENTATION
ORIENTATION: LOWER
ORIENTATION: LOWER;MID

## 2018-09-15 ASSESSMENT — PAIN DESCRIPTION - LOCATION
LOCATION: BACK
LOCATION: BACK;HIP;LEG
LOCATION: BACK

## 2018-09-15 ASSESSMENT — ENCOUNTER SYMPTOMS
CONSTIPATION: 0
ABDOMINAL DISTENTION: 0
SHORTNESS OF BREATH: 1
COUGH: 0
BACK PAIN: 0
SORE THROAT: 0
EYE PAIN: 0
EYE REDNESS: 0

## 2018-09-15 ASSESSMENT — PAIN SCALES - GENERAL
PAINLEVEL_OUTOF10: 8
PAINLEVEL_OUTOF10: 9

## 2018-09-15 ASSESSMENT — PAIN DESCRIPTION - FREQUENCY: FREQUENCY: CONTINUOUS

## 2018-09-15 ASSESSMENT — PAIN DESCRIPTION - DESCRIPTORS: DESCRIPTORS: CONSTANT

## 2018-09-15 NOTE — PROGRESS NOTES
Pt takes Percocet 7.5 mg every four to six hours at home. Current orders are for every eight. May this be changed to every four, please? Thank you!  Sent to Dr. Sally Mae
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
## 2018-09-15 NOTE — PROGRESS NOTES
laparoscopic cholecystectomy with intraoperative cholangiogram    OTHER SURGICAL HISTORY  2018    PORT PLACEMENT  - vas cath    OTHER SURGICAL HISTORY Bilateral 06/26/2018    laprascopic peritoneal dialysis catheter placement    TONSILLECTOMY      UPPER GASTROINTESTINAL ENDOSCOPY  01/06/2016    UPPER GASTROINTESTINAL ENDOSCOPY  01/29/2017    possible candida, otherwise normal appearing    VASCULAR SURGERY  aprx 2 years ago    2 stents placed, each side of groin       Level of Consciousness: Alert, Oriented, and Cooperative = 0    Level of Activity: Walking unassisted = 0    Respiratory Pattern: Dyspnea with exertion;Irregular pattern;or RR less than 6 = 2    Breath Sounds: Diminshed bilaterally and/or crackles = 2    Sputum   ,  ,    Cough: Strong, spontaneous, non-productive = 0    Vital Signs   BP (!) 177/91   Pulse 96   Temp 99.2 °F (37.3 °C) (Oral)   Resp 18   Ht 5' 8\" (1.727 m)   Wt 205 lb 6.4 oz (93.2 kg)   SpO2 98%   BMI 31.23 kg/m²   SPO2 (COPD values may differ): Greater than or equal to 92% on room air = 0    Peak Flow (asthma only): not applicable = 0    RSI: 5-6 = Q4hr PRN (every four hours as needed) for dyspnea        Plan       Goals: medication delivery, mobilize retained secretions, volume expansion and improve oxygenation    Patient/caregiver was educated on the proper method of use for Respiratory Care Devices:  Yes      Level of patient/caregiver understanding able to:   [x] Verbalize understanding   [x] Demonstrate understanding       [] Teach back        [] Needs reinforcement       []  No available caregiver               []  Other:     Response to education:  Good     Is patient being placed on Home Treatment Regimen? Yes     Does the patient have everything they need prior to discharge? NA     Comments: Pt assessed and meds reviewed, refused tobacco cessation. Requests HHN Q4h.     Plan of Care: Duoneb Q4h, Albuterol MDI PRN    Electronically signed by Maria Alejandra Allred RCP on 9/15/2018 at 12:22 AM    Respiratory Protocol Guidelines     1. Assessment and treatment by Respiratory Therapy will be initiated for medication and therapeutic interventions upon initiation of aerosolized medication. 2. Physician will be contacted for respiratory rate (RR) greater than 35 breaths per minute. Therapy will be held for heart rate (HR) greater than 140 beats per minute, pending direction from physician. 3. Bronchodilators will be administered via Metered Dose Inhaler (MDI) with spacer when the following criteria are met:  a. Alert and cooperative     b. HR < 140 bpm  c. RR < 30 bpm                d. Can demonstrate a 2-3 second inspiratory hold  4. Bronchodilators will be administered via Hand Held Nebulizer [facility]) to patients when ANY of the following criteria are met  a. Incognizant or uncooperative          b. Patients treated with HHN at Home        c. Unable to demonstrate proper use of MDI with spacer     d. RR > 30 bpm   5. Bronchodilators will be delivered via Metered Dose Inhaler (MDI), HHN, Aerogen to intubated patients on mechanical ventilation. 6. Inhalation medication orders will be delivered and/or substituted as outlined below. Aerosolized Medications Ordering and Administration Guidelines:    1. All Medications will be ordered by a physician, and their frequency and/or modality will be adjusted as defined by the patients Respiratory Severity Index (RSI) score. 2. If the patient does not have documented COPD, consider discontinuing anticholinergics when RSI is less than 9.  3. If the bronchospasm worsens (increased RSI), then the bronchodilator frequency can be increased to a maximum of every 4 hours. If greater than every 4 hours is required, the physician will be contacted. 4. If the bronchospasm improves, the frequency of the bronchodilator can be decreased, based on the patient's RSI, but not less than home treatment regimen frequency.   5. Bronchodilator(s) will be discontinued if patient has a RSI less than 9 and has received no scheduled or as needed treatment for 72  Hrs. Patients Ordered on a Mucolytic Agent:    1. Must always be administered with a bronchodilator. 2. Discontinue if patient experiences worsened bronchospasm, or secretions have lessened to the point that the patient is able to clear them with a cough. Anti-inflammatory and Combination Medications:    1. If the patient lacks prior history of lung disease, is not using inhaled anti-inflammatory medication at home, and lacks wheezing by examination or by history for at least 24 hours, contact physician for possible discontinuation.

## 2018-09-15 NOTE — ED NOTES
During triage, pt reported he still produces urine. Pt states he is unable to void at this time. Pt is drinking a bottle of Sprite.      Rl Streeter RN  09/14/18 [id number]

## 2018-09-15 NOTE — ED NOTES
Bedside report given to Caroline Rojas while pt still in ER. Pt transferred to floor via wheelchair in stable condition.      Jeannine Gallegos RN  09/14/18 0175

## 2018-09-15 NOTE — PROGRESS NOTES

## 2018-09-16 LAB
ANION GAP SERPL CALCULATED.3IONS-SCNC: 12 MMOL/L (ref 3–16)
BUN BLDV-MCNC: 50 MG/DL (ref 7–20)
CALCIUM SERPL-MCNC: 8.4 MG/DL (ref 8.3–10.6)
CHLORIDE BLD-SCNC: 97 MMOL/L (ref 99–110)
CO2: 28 MMOL/L (ref 21–32)
CREAT SERPL-MCNC: 3 MG/DL (ref 0.9–1.3)
GFR AFRICAN AMERICAN: 27
GFR NON-AFRICAN AMERICAN: 22
GLUCOSE BLD-MCNC: 268 MG/DL (ref 70–99)
GLUCOSE BLD-MCNC: 279 MG/DL (ref 70–99)
GLUCOSE BLD-MCNC: 316 MG/DL (ref 70–99)
GLUCOSE BLD-MCNC: 348 MG/DL (ref 70–99)
GLUCOSE BLD-MCNC: 390 MG/DL (ref 70–99)
MAGNESIUM: 2 MG/DL (ref 1.8–2.4)
PERFORMED ON: ABNORMAL
POTASSIUM REFLEX MAGNESIUM: 3.4 MMOL/L (ref 3.5–5.1)
SODIUM BLD-SCNC: 137 MMOL/L (ref 136–145)

## 2018-09-16 PROCEDURE — 6360000002 HC RX W HCPCS: Performed by: INTERNAL MEDICINE

## 2018-09-16 PROCEDURE — 94761 N-INVAS EAR/PLS OXIMETRY MLT: CPT

## 2018-09-16 PROCEDURE — 2060000000 HC ICU INTERMEDIATE R&B

## 2018-09-16 PROCEDURE — 6370000000 HC RX 637 (ALT 250 FOR IP): Performed by: NURSE PRACTITIONER

## 2018-09-16 PROCEDURE — 6370000000 HC RX 637 (ALT 250 FOR IP): Performed by: INTERNAL MEDICINE

## 2018-09-16 PROCEDURE — 94640 AIRWAY INHALATION TREATMENT: CPT

## 2018-09-16 PROCEDURE — 99233 SBSQ HOSP IP/OBS HIGH 50: CPT | Performed by: INTERNAL MEDICINE

## 2018-09-16 PROCEDURE — 2580000003 HC RX 258: Performed by: INTERNAL MEDICINE

## 2018-09-16 PROCEDURE — 80048 BASIC METABOLIC PNL TOTAL CA: CPT

## 2018-09-16 PROCEDURE — 2700000000 HC OXYGEN THERAPY PER DAY

## 2018-09-16 PROCEDURE — 83735 ASSAY OF MAGNESIUM: CPT

## 2018-09-16 PROCEDURE — 3E1M39Z IRRIGATION OF PERITONEAL CAVITY USING DIALYSATE, PERCUTANEOUS APPROACH: ICD-10-PCS | Performed by: INTERNAL MEDICINE

## 2018-09-16 PROCEDURE — 94664 DEMO&/EVAL PT USE INHALER: CPT

## 2018-09-16 PROCEDURE — 94660 CPAP INITIATION&MGMT: CPT

## 2018-09-16 PROCEDURE — 36415 COLL VENOUS BLD VENIPUNCTURE: CPT

## 2018-09-16 RX ORDER — CARVEDILOL 25 MG/1
25 TABLET ORAL 2 TIMES DAILY WITH MEALS
Status: DISCONTINUED | OUTPATIENT
Start: 2018-09-17 | End: 2018-09-19

## 2018-09-16 RX ORDER — CARVEDILOL 6.25 MG/1
18.75 TABLET ORAL 2 TIMES DAILY WITH MEALS
Status: DISCONTINUED | OUTPATIENT
Start: 2018-09-16 | End: 2018-09-16

## 2018-09-16 RX ORDER — LOSARTAN POTASSIUM 25 MG/1
50 TABLET ORAL DAILY
Status: DISCONTINUED | OUTPATIENT
Start: 2018-09-17 | End: 2018-09-20

## 2018-09-16 RX ORDER — LOSARTAN POTASSIUM 25 MG/1
25 TABLET ORAL DAILY
Status: DISCONTINUED | OUTPATIENT
Start: 2018-09-16 | End: 2018-09-16

## 2018-09-16 RX ORDER — PANTOPRAZOLE SODIUM 40 MG/1
40 TABLET, DELAYED RELEASE ORAL
Status: DISCONTINUED | OUTPATIENT
Start: 2018-09-16 | End: 2018-09-25 | Stop reason: HOSPADM

## 2018-09-16 RX ORDER — LOSARTAN POTASSIUM 100 MG/1
100 TABLET ORAL DAILY
Status: DISCONTINUED | OUTPATIENT
Start: 2018-09-17 | End: 2018-09-16

## 2018-09-16 RX ORDER — LOSARTAN POTASSIUM 25 MG/1
50 TABLET ORAL DAILY
Status: DISCONTINUED | OUTPATIENT
Start: 2018-09-17 | End: 2018-09-16

## 2018-09-16 RX ORDER — POTASSIUM CHLORIDE 20 MEQ/1
20 TABLET, EXTENDED RELEASE ORAL 2 TIMES DAILY WITH MEALS
Status: DISCONTINUED | OUTPATIENT
Start: 2018-09-16 | End: 2018-09-19

## 2018-09-16 RX ORDER — CARVEDILOL 25 MG/1
25 TABLET ORAL 2 TIMES DAILY WITH MEALS
Status: DISCONTINUED | OUTPATIENT
Start: 2018-09-16 | End: 2018-09-16

## 2018-09-16 RX ORDER — DOBUTAMINE HYDROCHLORIDE 100 MG/100ML
10 INJECTION INTRAVENOUS CONTINUOUS
Status: DISCONTINUED | OUTPATIENT
Start: 2018-09-16 | End: 2018-09-16 | Stop reason: SDUPTHER

## 2018-09-16 RX ADMIN — CALCIUM CARBONATE 500 MG: 500 TABLET, CHEWABLE ORAL at 19:53

## 2018-09-16 RX ADMIN — FUROSEMIDE 60 MG: 10 INJECTION, SOLUTION INTRAMUSCULAR; INTRAVENOUS at 09:24

## 2018-09-16 RX ADMIN — POTASSIUM CHLORIDE 20 MEQ: 20 TABLET, EXTENDED RELEASE ORAL at 18:32

## 2018-09-16 RX ADMIN — SODIUM CHLORIDE, SODIUM LACTATE, CALCIUM CHLORIDE, MAGNESIUM CHLORIDE AND DEXTROSE: 2.5; 538; 448; 18.3; 5.08 INJECTION, SOLUTION INTRAPERITONEAL at 13:58

## 2018-09-16 RX ADMIN — SODIUM CHLORIDE, SODIUM LACTATE, CALCIUM CHLORIDE, MAGNESIUM CHLORIDE AND DEXTROSE: 2.5; 538; 448; 18.3; 5.08 INJECTION, SOLUTION INTRAPERITONEAL at 18:11

## 2018-09-16 RX ADMIN — PANTOPRAZOLE SODIUM 40 MG: 40 TABLET, DELAYED RELEASE ORAL at 20:37

## 2018-09-16 RX ADMIN — IPRATROPIUM BROMIDE AND ALBUTEROL SULFATE 3 ML: .5; 3 SOLUTION RESPIRATORY (INHALATION) at 04:08

## 2018-09-16 RX ADMIN — INSULIN LISPRO 4 UNITS: 100 INJECTION, SOLUTION INTRAVENOUS; SUBCUTANEOUS at 11:38

## 2018-09-16 RX ADMIN — OXYCODONE HYDROCHLORIDE AND ACETAMINOPHEN 1 TABLET: 7.5; 325 TABLET ORAL at 14:06

## 2018-09-16 RX ADMIN — DILTIAZEM HYDROCHLORIDE 180 MG: 180 CAPSULE, COATED, EXTENDED RELEASE ORAL at 09:22

## 2018-09-16 RX ADMIN — OXYCODONE HYDROCHLORIDE AND ACETAMINOPHEN 1 TABLET: 7.5; 325 TABLET ORAL at 22:22

## 2018-09-16 RX ADMIN — Medication 10 ML: at 09:25

## 2018-09-16 RX ADMIN — INSULIN LISPRO 2 UNITS: 100 INJECTION, SOLUTION INTRAVENOUS; SUBCUTANEOUS at 22:22

## 2018-09-16 RX ADMIN — ASPIRIN 81 MG 81 MG: 81 TABLET ORAL at 09:22

## 2018-09-16 RX ADMIN — CLOPIDOGREL BISULFATE 75 MG: 75 TABLET ORAL at 09:23

## 2018-09-16 RX ADMIN — Medication 10 ML: at 22:22

## 2018-09-16 RX ADMIN — LOSARTAN POTASSIUM 25 MG: 25 TABLET, FILM COATED ORAL at 09:23

## 2018-09-16 RX ADMIN — INSULIN LISPRO 5 UNITS: 100 INJECTION, SOLUTION INTRAVENOUS; SUBCUTANEOUS at 09:31

## 2018-09-16 RX ADMIN — ATORVASTATIN CALCIUM 40 MG: 40 TABLET, FILM COATED ORAL at 22:22

## 2018-09-16 RX ADMIN — IPRATROPIUM BROMIDE AND ALBUTEROL SULFATE 3 ML: .5; 3 SOLUTION RESPIRATORY (INHALATION) at 07:57

## 2018-09-16 RX ADMIN — SODIUM CHLORIDE, SODIUM LACTATE, CALCIUM CHLORIDE, MAGNESIUM CHLORIDE AND DEXTROSE: 2.5; 538; 448; 18.3; 5.08 INJECTION, SOLUTION INTRAPERITONEAL at 22:15

## 2018-09-16 RX ADMIN — SODIUM CHLORIDE, SODIUM LACTATE, CALCIUM CHLORIDE, MAGNESIUM CHLORIDE AND DEXTROSE: 2.5; 538; 448; 18.3; 5.08 INJECTION, SOLUTION INTRAPERITONEAL at 02:13

## 2018-09-16 RX ADMIN — CITALOPRAM HYDROBROMIDE 20 MG: 20 TABLET ORAL at 09:23

## 2018-09-16 RX ADMIN — SODIUM CHLORIDE, SODIUM LACTATE, CALCIUM CHLORIDE, MAGNESIUM CHLORIDE AND DEXTROSE: 2.5; 538; 448; 18.3; 5.08 INJECTION, SOLUTION INTRAPERITONEAL at 10:17

## 2018-09-16 RX ADMIN — ISOSORBIDE MONONITRATE 30 MG: 30 TABLET, EXTENDED RELEASE ORAL at 09:22

## 2018-09-16 RX ADMIN — HEPARIN SODIUM 5000 UNITS: 5000 INJECTION INTRAVENOUS; SUBCUTANEOUS at 22:22

## 2018-09-16 RX ADMIN — INSULIN LISPRO 3 UNITS: 100 INJECTION, SOLUTION INTRAVENOUS; SUBCUTANEOUS at 18:34

## 2018-09-16 RX ADMIN — HEPARIN SODIUM 5000 UNITS: 5000 INJECTION INTRAVENOUS; SUBCUTANEOUS at 14:06

## 2018-09-16 RX ADMIN — SODIUM CHLORIDE, SODIUM LACTATE, CALCIUM CHLORIDE, MAGNESIUM CHLORIDE AND DEXTROSE: 2.5; 538; 448; 18.3; 5.08 INJECTION, SOLUTION INTRAPERITONEAL at 06:00

## 2018-09-16 RX ADMIN — FUROSEMIDE 60 MG: 10 INJECTION, SOLUTION INTRAMUSCULAR; INTRAVENOUS at 18:32

## 2018-09-16 RX ADMIN — OXYCODONE HYDROCHLORIDE AND ACETAMINOPHEN 1 TABLET: 7.5; 325 TABLET ORAL at 09:23

## 2018-09-16 RX ADMIN — TRAZODONE HYDROCHLORIDE 150 MG: 50 TABLET ORAL at 22:22

## 2018-09-16 RX ADMIN — IPRATROPIUM BROMIDE AND ALBUTEROL SULFATE 3 ML: .5; 3 SOLUTION RESPIRATORY (INHALATION) at 12:14

## 2018-09-16 RX ADMIN — CALCIUM CARBONATE 500 MG: 500 TABLET, CHEWABLE ORAL at 07:01

## 2018-09-16 RX ADMIN — HEPARIN SODIUM 5000 UNITS: 5000 INJECTION INTRAVENOUS; SUBCUTANEOUS at 06:42

## 2018-09-16 ASSESSMENT — PAIN DESCRIPTION - LOCATION
LOCATION: BACK
LOCATION: BACK

## 2018-09-16 ASSESSMENT — PAIN DESCRIPTION - PAIN TYPE
TYPE: CHRONIC PAIN
TYPE: CHRONIC PAIN

## 2018-09-16 ASSESSMENT — PAIN SCALES - GENERAL
PAINLEVEL_OUTOF10: 8

## 2018-09-16 ASSESSMENT — PAIN DESCRIPTION - ORIENTATION: ORIENTATION: LOWER

## 2018-09-16 NOTE — PROGRESS NOTES
VSS - afebrile. Pt is alert and oriented x 4 with no history of falls. Assessment completed as charted. Bed is in lowest position with 2/4 bed rails raised, wheels locked and call light within reach - patient wearing non-skid socks and verbalizes understanding to call out for assistance. No further requests at this time. Will continue to monitor.

## 2018-09-16 NOTE — PROGRESS NOTES
Department of Internal Medicine  Nephrology Progress Note        SUBJECTIVE:    We are following this patient for ESRD. The patient was seen and examined; he feels well today with no CP, SOB, nausea or vomiting. ROS: No fever or chills. Social: No family at bedside. Physical Exam:    VITALS:  BP (!) 177/71   Pulse 81   Temp 98 °F (36.7 °C) (Oral)   Resp 16   Ht 5' 8\" (1.727 m)   Wt 205 lb (93 kg)   SpO2 94%   BMI 31.17 kg/m²     General appearance: Seems comfortable, no acute distress. Neck: Trachea midline, thyroid normal.   Lungs:  Non labored breathing, CTA to anterior auscultation. Heart:  S1S2 normal, rub or gallop. + peripheral edema. Abdomen: Soft, non-tender, no organomegaly. Skin: No lesions or rashes, warm to touch. DATA:    CBC:   Lab Results   Component Value Date    WBC 13.6 09/15/2018    RBC 3.68 09/15/2018    HGB 11.1 09/15/2018    HCT 33.1 09/15/2018    MCV 90.1 09/15/2018    MCH 30.1 09/15/2018    MCHC 33.4 09/15/2018    RDW 15.5 09/15/2018     09/15/2018    MPV 7.9 09/15/2018     BMP:    Lab Results   Component Value Date     09/15/2018    K 3.2 09/15/2018     09/15/2018    CO2 27 09/15/2018    BUN 63 09/15/2018    LABALBU 3.5 09/14/2018    CREATININE 3.2 09/15/2018    CALCIUM 8.3 09/15/2018    GFRAA 25 09/15/2018    GFRAA >60 05/17/2013    LABGLOM 21 09/15/2018    GLUCOSE 274 09/15/2018       IMPRESSION/RECOMMENDATIONS:      1- ESRD: We will continue CAPD using 2.5% Dianeal every 4 hours. 2- Leaking Tenckhoff catheter: Pending evaluation by Dr. Antonio Garcia from surgery. 3- Hypokalemia: S/p PRN potassium replacement. 4- HTN: Blood pressure remains elevated, we will increase Coreg and Cozaar for better control. 5- Anemia: Hemoglobin above target for ESRD, hold DAVON and monitor.

## 2018-09-16 NOTE — PROGRESS NOTES
carbohydrate diet. 9. Obstructive sleep apnea treated with CPAP from home. 10. Hypertension continued on home meds. 11. Nicotine dependence:  Declined nicotine replacement therapy at this time.       Advance Directive: Full code, confirmed by patient. DVT prophylaxis with heparin 5,000 unit sub-Q three times per day. Discharge planning: Will require at least another two days inpatient status.       Gurvinder Meraz MD  Rounding Hospitalist

## 2018-09-16 NOTE — PROGRESS NOTES
weight gain of three pounds in a day/five pounds in a week. Also notified patient to call the doctor with dizziness, increased fatigue, decreased or difficulty urinating. Pt verbalized understanding. No additional questions at this time.     Education Time: 30 Minutes

## 2018-09-16 NOTE — PROGRESS NOTES
Cardiovascular Progress Note      Chief Complaint:   Chief Complaint   Patient presents with    Shortness of Breath     x 3 days,      Impression/Recommendations:    Mr. Isabel Guzman is a 46 y.o. male patient last seen in the office in June 2018:      Acute Combined Systolic and Diastolic HF (28-46% by 2/29/88 TTE; Moderate AS (bicuspid aortic valve)) in setting of incomplete dialysis   Elevated troponins, plateau at 0.63, in setting of ESRD and volume overload   CAD (patent LAD stent and mild residual nonobstructive disease by January 2017 Cath)   COPD  ESRD, recently switched to PD   PVD  ZAINAB  DM  Nicotine dependence  HTN, accelerated   HLD        Continue home DAPT/Statin as well as BB/CCB/Long acting nitrate. Discussed dietary indiscretions and tobacco cessation at length. General Surgery for further evaluation of his leaking peritoneal dialysis catheter; will need sustainable means to dialyze and maintain dry weight    EF newly reduced- dobutamine stress echo to assess for low flow low gradient aortic stenosis. Interval History:   Felipe Rios is still not at baseline overnight he feels. On RA without conversational dyspnea. Dwelling. Denies CP. Discussed AS status with newly reduced LVEF. Agreeable to further evaluation.      Tele: SR 70s       Medications:    carvedilol (COREG) tablet 25 mg BID WC   [START ON 9/17/2018] losartan (COZAAR) tablet 50 mg Daily   ipratropium-albuterol (DUONEB) nebulizer solution 3 mL Q4H   dianeal lo-trever 2.5% CAPD Q4H   oxyCODONE-acetaminophen (PERCOCET) 7.5-325 MG per tablet 1 tablet Q4H PRN   albuterol sulfate  (90 Base) MCG/ACT inhaler 2 puff Q6H PRN   aspirin chewable tablet 81 mg Daily   atorvastatin (LIPITOR) tablet 40 mg Nightly   calcium carbonate (TUMS) chewable tablet 500 mg TID PRN   citalopram (CELEXA) tablet 20 mg Daily   clopidogrel (PLAVIX) tablet 75 mg Daily   diltiazem (CARDIZEM CD) extended release capsule 180 mg Daily   isosorbide mononitrate

## 2018-09-16 NOTE — PROGRESS NOTES
Received bedside report for Cynthia Kaur RN; pt is alert and oriented; pt is a PD pt; pt states he has been empty for 1 week and that he was told to not fill. Pt complains of sharp pain in abd. This RN read the notes and nephro and hospitalist both state the do Q4 PD. Pt states he does follow any fluid restrictions and that he does not allow for BS checks and insuline for coverage. Pt was encouraged to allow that process.  BP (!) 186/76   Pulse 77   Temp 97.8 °F (36.6 °C) (Oral)   Resp 18   Ht 5' 8\" (1.727 m)   Wt 205 lb (93 kg)   SpO2 97%   BMI 31.17 kg/m²

## 2018-09-17 LAB
ANION GAP SERPL CALCULATED.3IONS-SCNC: 11 MMOL/L (ref 3–16)
BASOPHILS ABSOLUTE: 0 K/UL (ref 0–0.2)
BASOPHILS RELATIVE PERCENT: 0.4 %
BUN BLDV-MCNC: 43 MG/DL (ref 7–20)
CALCIUM SERPL-MCNC: 8.3 MG/DL (ref 8.3–10.6)
CHLORIDE BLD-SCNC: 95 MMOL/L (ref 99–110)
CO2: 29 MMOL/L (ref 21–32)
CREAT SERPL-MCNC: 3.1 MG/DL (ref 0.9–1.3)
EOSINOPHILS ABSOLUTE: 0.3 K/UL (ref 0–0.6)
EOSINOPHILS RELATIVE PERCENT: 3 %
GFR AFRICAN AMERICAN: 26
GFR NON-AFRICAN AMERICAN: 21
GLUCOSE BLD-MCNC: 207 MG/DL (ref 70–99)
GLUCOSE BLD-MCNC: 235 MG/DL (ref 70–99)
GLUCOSE BLD-MCNC: 277 MG/DL (ref 70–99)
GLUCOSE BLD-MCNC: 278 MG/DL (ref 70–99)
GLUCOSE BLD-MCNC: 300 MG/DL (ref 70–99)
HCT VFR BLD CALC: 31.6 % (ref 40.5–52.5)
HEMOGLOBIN: 10.6 G/DL (ref 13.5–17.5)
LYMPHOCYTES ABSOLUTE: 1.4 K/UL (ref 1–5.1)
LYMPHOCYTES RELATIVE PERCENT: 12.5 %
MAGNESIUM: 1.8 MG/DL (ref 1.8–2.4)
MCH RBC QN AUTO: 30.1 PG (ref 26–34)
MCHC RBC AUTO-ENTMCNC: 33.6 G/DL (ref 31–36)
MCV RBC AUTO: 89.7 FL (ref 80–100)
MONOCYTES ABSOLUTE: 0.9 K/UL (ref 0–1.3)
MONOCYTES RELATIVE PERCENT: 7.6 %
NEUTROPHILS ABSOLUTE: 8.8 K/UL (ref 1.7–7.7)
NEUTROPHILS RELATIVE PERCENT: 76.5 %
PDW BLD-RTO: 15.7 % (ref 12.4–15.4)
PERFORMED ON: ABNORMAL
PLATELET # BLD: 255 K/UL (ref 135–450)
PMV BLD AUTO: 7.8 FL (ref 5–10.5)
POTASSIUM REFLEX MAGNESIUM: 3.2 MMOL/L (ref 3.5–5.1)
RBC # BLD: 3.53 M/UL (ref 4.2–5.9)
SODIUM BLD-SCNC: 135 MMOL/L (ref 136–145)
WBC # BLD: 11.5 K/UL (ref 4–11)

## 2018-09-17 PROCEDURE — 6360000002 HC RX W HCPCS: Performed by: INTERNAL MEDICINE

## 2018-09-17 PROCEDURE — 6370000000 HC RX 637 (ALT 250 FOR IP): Performed by: INTERNAL MEDICINE

## 2018-09-17 PROCEDURE — 6370000000 HC RX 637 (ALT 250 FOR IP): Performed by: HOSPITALIST

## 2018-09-17 PROCEDURE — 99222 1ST HOSP IP/OBS MODERATE 55: CPT | Performed by: SURGERY

## 2018-09-17 PROCEDURE — 2060000000 HC ICU INTERMEDIATE R&B

## 2018-09-17 PROCEDURE — 2700000000 HC OXYGEN THERAPY PER DAY

## 2018-09-17 PROCEDURE — 2580000003 HC RX 258: Performed by: INTERNAL MEDICINE

## 2018-09-17 PROCEDURE — 94761 N-INVAS EAR/PLS OXIMETRY MLT: CPT

## 2018-09-17 PROCEDURE — 6370000000 HC RX 637 (ALT 250 FOR IP): Performed by: NURSE PRACTITIONER

## 2018-09-17 PROCEDURE — 85025 COMPLETE CBC W/AUTO DIFF WBC: CPT

## 2018-09-17 PROCEDURE — 36415 COLL VENOUS BLD VENIPUNCTURE: CPT

## 2018-09-17 PROCEDURE — C8928 TTE W OR W/O FOL W/CON,STRES: HCPCS

## 2018-09-17 PROCEDURE — 94660 CPAP INITIATION&MGMT: CPT

## 2018-09-17 PROCEDURE — 99233 SBSQ HOSP IP/OBS HIGH 50: CPT | Performed by: NURSE PRACTITIONER

## 2018-09-17 PROCEDURE — 80048 BASIC METABOLIC PNL TOTAL CA: CPT

## 2018-09-17 PROCEDURE — 83735 ASSAY OF MAGNESIUM: CPT

## 2018-09-17 RX ORDER — ATROPINE SULFATE 0.1 MG/ML
1 INJECTION INTRAVENOUS ONCE
Status: COMPLETED | OUTPATIENT
Start: 2018-09-17 | End: 2018-09-17

## 2018-09-17 RX ORDER — DOCUSATE SODIUM 100 MG/1
100 CAPSULE, LIQUID FILLED ORAL 2 TIMES DAILY
Status: DISCONTINUED | OUTPATIENT
Start: 2018-09-17 | End: 2018-09-25 | Stop reason: HOSPADM

## 2018-09-17 RX ORDER — POLYETHYLENE GLYCOL 3350 17 G/17G
17 POWDER, FOR SOLUTION ORAL DAILY
Status: DISCONTINUED | OUTPATIENT
Start: 2018-09-17 | End: 2018-09-25 | Stop reason: HOSPADM

## 2018-09-17 RX ORDER — ACETAMINOPHEN 500 MG
1000 TABLET ORAL EVERY 8 HOURS PRN
Status: DISCONTINUED | OUTPATIENT
Start: 2018-09-17 | End: 2018-09-25 | Stop reason: HOSPADM

## 2018-09-17 RX ORDER — PRAVASTATIN SODIUM 40 MG
40 TABLET ORAL NIGHTLY
Status: DISCONTINUED | OUTPATIENT
Start: 2018-09-17 | End: 2018-09-25 | Stop reason: HOSPADM

## 2018-09-17 RX ADMIN — CLOPIDOGREL BISULFATE 75 MG: 75 TABLET ORAL at 11:24

## 2018-09-17 RX ADMIN — CITALOPRAM HYDROBROMIDE 20 MG: 20 TABLET ORAL at 11:24

## 2018-09-17 RX ADMIN — SODIUM CHLORIDE, SODIUM LACTATE, CALCIUM CHLORIDE, MAGNESIUM CHLORIDE AND DEXTROSE: 2.5; 538; 448; 18.3; 5.08 INJECTION, SOLUTION INTRAPERITONEAL at 18:10

## 2018-09-17 RX ADMIN — HEPARIN SODIUM 5000 UNITS: 5000 INJECTION INTRAVENOUS; SUBCUTANEOUS at 21:58

## 2018-09-17 RX ADMIN — SODIUM CHLORIDE, SODIUM LACTATE, CALCIUM CHLORIDE, MAGNESIUM CHLORIDE AND DEXTROSE: 2.5; 538; 448; 18.3; 5.08 INJECTION, SOLUTION INTRAPERITONEAL at 10:54

## 2018-09-17 RX ADMIN — DILTIAZEM HYDROCHLORIDE 180 MG: 180 CAPSULE, COATED, EXTENDED RELEASE ORAL at 11:24

## 2018-09-17 RX ADMIN — ATROPINE SULFATE 1 MG: 0.1 INJECTION, SOLUTION ENDOTRACHEAL; INTRAMUSCULAR; INTRAVENOUS; SUBCUTANEOUS at 09:52

## 2018-09-17 RX ADMIN — FUROSEMIDE 60 MG: 10 INJECTION, SOLUTION INTRAMUSCULAR; INTRAVENOUS at 18:20

## 2018-09-17 RX ADMIN — FUROSEMIDE 60 MG: 10 INJECTION, SOLUTION INTRAMUSCULAR; INTRAVENOUS at 11:21

## 2018-09-17 RX ADMIN — TRAZODONE HYDROCHLORIDE 150 MG: 50 TABLET ORAL at 21:56

## 2018-09-17 RX ADMIN — PRAVASTATIN SODIUM 40 MG: 40 TABLET ORAL at 21:55

## 2018-09-17 RX ADMIN — DOCUSATE SODIUM 100 MG: 100 CAPSULE, LIQUID FILLED ORAL at 21:55

## 2018-09-17 RX ADMIN — SODIUM CHLORIDE, SODIUM LACTATE, CALCIUM CHLORIDE, MAGNESIUM CHLORIDE AND DEXTROSE: 2.5; 538; 448; 18.3; 5.08 INJECTION, SOLUTION INTRAPERITONEAL at 14:19

## 2018-09-17 RX ADMIN — OXYCODONE HYDROCHLORIDE AND ACETAMINOPHEN 1 TABLET: 7.5; 325 TABLET ORAL at 21:55

## 2018-09-17 RX ADMIN — SODIUM CHLORIDE, SODIUM LACTATE, CALCIUM CHLORIDE, MAGNESIUM CHLORIDE AND DEXTROSE: 2.5; 538; 448; 18.3; 5.08 INJECTION, SOLUTION INTRAPERITONEAL at 06:25

## 2018-09-17 RX ADMIN — Medication 10 ML: at 22:18

## 2018-09-17 RX ADMIN — POTASSIUM CHLORIDE 20 MEQ: 20 TABLET, EXTENDED RELEASE ORAL at 18:19

## 2018-09-17 RX ADMIN — ASPIRIN 81 MG 81 MG: 81 TABLET ORAL at 11:24

## 2018-09-17 RX ADMIN — LOSARTAN POTASSIUM 50 MG: 25 TABLET, FILM COATED ORAL at 11:24

## 2018-09-17 RX ADMIN — HEPARIN SODIUM 5000 UNITS: 5000 INJECTION INTRAVENOUS; SUBCUTANEOUS at 14:26

## 2018-09-17 RX ADMIN — Medication 10 ML: at 11:25

## 2018-09-17 RX ADMIN — SODIUM CHLORIDE, SODIUM LACTATE, CALCIUM CHLORIDE, MAGNESIUM CHLORIDE AND DEXTROSE: 2.5; 538; 448; 18.3; 5.08 INJECTION, SOLUTION INTRAPERITONEAL at 02:21

## 2018-09-17 RX ADMIN — INSULIN LISPRO 3 UNITS: 100 INJECTION, SOLUTION INTRAVENOUS; SUBCUTANEOUS at 11:28

## 2018-09-17 RX ADMIN — POTASSIUM CHLORIDE 20 MEQ: 20 TABLET, EXTENDED RELEASE ORAL at 11:24

## 2018-09-17 RX ADMIN — SODIUM CHLORIDE, SODIUM LACTATE, CALCIUM CHLORIDE, MAGNESIUM CHLORIDE AND DEXTROSE: 2.5; 538; 448; 18.3; 5.08 INJECTION, SOLUTION INTRAPERITONEAL at 22:10

## 2018-09-17 RX ADMIN — OXYCODONE HYDROCHLORIDE AND ACETAMINOPHEN 1 TABLET: 7.5; 325 TABLET ORAL at 10:54

## 2018-09-17 RX ADMIN — INSULIN LISPRO 2 UNITS: 100 INJECTION, SOLUTION INTRAVENOUS; SUBCUTANEOUS at 18:22

## 2018-09-17 RX ADMIN — DOBUTAMINE 10 MCG/KG/MIN: 12.5 INJECTION, SOLUTION, CONCENTRATE INTRAVENOUS at 09:15

## 2018-09-17 RX ADMIN — CARVEDILOL 25 MG: 25 TABLET, FILM COATED ORAL at 18:19

## 2018-09-17 RX ADMIN — OXYCODONE HYDROCHLORIDE AND ACETAMINOPHEN 1 TABLET: 7.5; 325 TABLET ORAL at 18:19

## 2018-09-17 RX ADMIN — ISOSORBIDE MONONITRATE 30 MG: 30 TABLET, EXTENDED RELEASE ORAL at 11:24

## 2018-09-17 RX ADMIN — INSULIN LISPRO 1 UNITS: 100 INJECTION, SOLUTION INTRAVENOUS; SUBCUTANEOUS at 21:58

## 2018-09-17 ASSESSMENT — PAIN SCALES - GENERAL
PAINLEVEL_OUTOF10: 8
PAINLEVEL_OUTOF10: 8
PAINLEVEL_OUTOF10: 9
PAINLEVEL_OUTOF10: 7
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
## 2018-09-17 NOTE — PROGRESS NOTES
A Dobutamine echo stress test was completed on this patient as ordered. The patient tolerated the procedure well.

## 2018-09-17 NOTE — PLAN OF CARE
Problem: Serum Glucose Level - Abnormal:  Goal: Ability to maintain appropriate glucose levels will improve  Ability to maintain appropriate glucose levels will improve   Outcome: Ongoing  Reviewed with patient the importance of checking his blood sugars and taking his insulin appropriately. Patient is resistant to home diabetic medications. Further education is needed. Will continue to reinforce.

## 2018-09-17 NOTE — PROGRESS NOTES
09/16/18 2324   NIV Type   NIV Started Yes   Equipment Type v60   Mode CPAP   Mask Type Full face mask   Mask Size Medium   Settings/Measurements   Comfort Level Good   Using Accessory Muscles No   CPAP 10 cmH2O   Resp 14   SpO2 98   FiO2  30 %   Vt Exhaled 661 mL   Mask Leak (lpm) 43 lpm   Skin Protection for O2 Device Yes   Alarm Settings   Alarms On Y   Press Low Alarm 6 cmH2O   High Pressure Alarm 20 cmH2O   Apnea (secs) 20 secs   Resp Rate Low Alarm 45   High Respiratory Rate 6 br/min

## 2018-09-17 NOTE — PROGRESS NOTES
2015   -most recent [address] [facility] Jan 2017 with patent stent   -chest pain not consistent with angina, re-evaluate after diuresis   -elevated troponin but is setting of ESRD not having received any dialysis (HD or PD) for 1 week   -continue ASA, statin, BB   -okay to hold Plavix for 5 days for PD catheter placement (last dose 9/17/18)  ~HTN   -sub-optimal   -coreg dose increased (has not yet received due to dobt stress echo)   -losartan dose increased per nephrology   -monitor  ~ESRD   -transitioned from HD to PD about 1 month ago, PD catheter leaking   -to have TD catheter placed for HD tomorrow   -per nephrology  ~HLD   -atorvastatin changed to pravastatin due to diarrhea  ~Nicotine dependence   -non-smoker X8 days, denies need for replacement or assistance med      Discussed with patient at length results of testing, options of further testing and treatment. At this time he wishes to pursue PD catheter replacement and diuresis. Will re-assess symptoms. EF and aortic valve after diuresis and BP control. Okay to hold plavix for 5 days for PD catheter surgery. Will follow along.     Marta Wiggins, JAY - CNP, 9/17/2018, 3:58 PM

## 2018-09-17 NOTE — PROGRESS NOTES
Hospitalist Progress Note  9/17/2018 1:28 PM  Subjective:   Admit Date: 9/14/2018  PCP: Bertha Shea MD Status: Inpatient  Interval History: Hospital Day: 4, admitted with dyspnea and pulmonary edema. Less dyspnea. Difficulty sleeping without home CPAP. Appetite reduced. Dobutamine stress test pending in the morning. History of present illness:   46 y.o. male with ESRD who had been on hemodialysis since earlier in the year (does not remember exactly) who transitioned to peritoneal dialysis a month ago with increased leaking of dialysis catheter. Renal failure apparently a result of contrast nephropathy and diabetes. Right chest tunneled catheter removed a week ago. He also has a left arm fistula that is not functioning. He experienced progressive dyspnea with swelling over the past week. History of COPD diagnosed five years ago and followed by Dr. Jesu Mix. He quit smoking a week ago after 60 pk/yr history of smoking. He is on multiple inhalers and nebulizer at home but does not remember exactly which ones. Appears to be Duonebs and albuterol MDI. History of type 2 diabetes and he has not been on insulin or any other medication since starting hemodilalysis    DIET CARB CONTROL; Low Sodium (2 GM);  Daily Fluid Restriction: 1500 ml - NPO after midnight  Medications:     carvedilol  25 mg Oral BID WC   losartan  50 mg Oral Daily   ipratropium-albuterol  1 vial Inhalation Q4H   dianeal lo-trever 2.5%   Intraperitoneal Q4H   aspirin  81 mg Oral Daily   atorvastatin  40 mg Oral Nightly   citalopram  20 mg Oral Daily   clopidogrel  75 mg Oral Daily   diltiazem  180 mg Oral Daily   isosorbide mononitrate  30 mg Oral Daily   traZODone  150 mg Oral Nightly   insulin lispro SSI   0-6 Units Subcutaneous TID WC / HS   furosemide  60 mg Intravenous BID (9/14, day #3)   heparin (porcine)  5,000 Units Subcutaneous 3 times per day     Recent Labs      09/14/18   2023  09/15/18   0721  09/17/18   0506   WBC  15.9* No rashes or lesions  HEENT: Head: Normocephalic, no lesions, without obvious abnormality. Neck: no adenopathy, no carotid bruit, no JVD, supple, symmetrical, trachea midline and thyroid not enlarged, symmetric, no tenderness/mass/nodules  Lungs: clear to auscultation bilaterally  Heart: regular rate and rhythm, S1, S2 normal, no murmur, click, rub or gallop  Abdomen: protuberent, left lower quadrant peritoneal dialysis catherter noted, audible bowel sounds  Extremities: 2+ pitting edema to mid shin, neurovascular status intact  Neurologic: Mental status: Alert, oriented, thought content appropriate    Assessment and Plan:   1. Combined systolic and diastolic heart failure, NYHA class 4:  Volume retention due to ESRD and inadequate peritoneal dialysis admitted to inpatient status. Intravenous furosemide 60 mg IV BID and nephrology evaluation initiated. 2. ESRD: Resume 2.5% Dianeal CAPD every 4 hours. Will need to transfer to  for CAPD. Surgery evaluation of leaking Tenckhoff catheter. He has stopped smoking in order to get on the kidney transplant list. Plan for Tunnel cath and changing PD catheter, need to hold Plavix, if OK with Cardiology. 3. Hypokalemia with normal magnesium:  Oral potassium replacement, monitor magnesium and potassium. 4. Chronic anemia of renal failure:  Above target for DAVON. 5. Acute Diastolic Heart Failure (HFpEF) and now heart failure with reduced ejection fraction (40-45%) with moderate aortic stenosis. 6. History of CAD with elevated troponin (patent LAD stent and mild residual nonobstructive disease by January 2017 Cath) continues on aspirin 81 mg, Plavix 75 mg, carvedilol 12.5 mg BID, and atorvastatin 40 mg nightly. Appreciate cardiology evaluation. 7. Aortic stenosis with bicuspid aortic valve. TTE artic valve area is calculated at 0.95 cm2 with a maximum pressure  gradient of 61 mmHg and a mean pressure gradient of 27 mmHg.   CT surgery evaluation pending

## 2018-09-17 NOTE — PROGRESS NOTES
89.7 09/17/2018    MCH 30.1 09/17/2018    MCHC 33.6 09/17/2018    RDW 15.7 09/17/2018     09/17/2018    MPV 7.8 09/17/2018     BMP:    Lab Results   Component Value Date     09/17/2018    K 3.2 09/17/2018    CL 95 09/17/2018    CO2 29 09/17/2018    BUN 43 09/17/2018    LABALBU 3.5 09/14/2018    CREATININE 3.1 09/17/2018    CALCIUM 8.3 09/17/2018    GFRAA 26 09/17/2018    GFRAA >60 05/17/2013    LABGLOM 21 09/17/2018    GLUCOSE 277 09/17/2018       IMPRESSION/RECOMMENDATIONS:      1- ESRD: We will continue CAPD using 2.5% Dianeal every 4 hours--> hold PD   - TDC in am and HD transition   -OP HDU - CCM consulted  2- Leaking Tenckhoff catheter: seen by Dr. Antonio Garcia from surgery and plans for new catheter once off plavix  3- Hypokalemia: S/p PRN potassium replacement. 4- HTN: Blood pressure remains elevated, we will increase Coreg and Cozaar for better control. 5- Anemia: Hemoglobin above target for ESRD, hold DAVON and monitor.

## 2018-09-18 ENCOUNTER — APPOINTMENT (OUTPATIENT)
Dept: INTERVENTIONAL RADIOLOGY/VASCULAR | Age: 48
DRG: 981 | End: 2018-09-18
Payer: MEDICARE

## 2018-09-18 LAB
ALBUMIN SERPL-MCNC: 2.8 G/DL (ref 3.4–5)
ANION GAP SERPL CALCULATED.3IONS-SCNC: 11 MMOL/L (ref 3–16)
BUN BLDV-MCNC: 39 MG/DL (ref 7–20)
CALCIUM SERPL-MCNC: 8.2 MG/DL (ref 8.3–10.6)
CHLORIDE BLD-SCNC: 96 MMOL/L (ref 99–110)
CHOLESTEROL, TOTAL: 118 MG/DL (ref 0–199)
CO2: 28 MMOL/L (ref 21–32)
CREAT SERPL-MCNC: 3.5 MG/DL (ref 0.9–1.3)
GFR AFRICAN AMERICAN: 23
GFR NON-AFRICAN AMERICAN: 19
GLUCOSE BLD-MCNC: 152 MG/DL (ref 70–99)
GLUCOSE BLD-MCNC: 162 MG/DL (ref 70–99)
GLUCOSE BLD-MCNC: 269 MG/DL (ref 70–99)
GLUCOSE BLD-MCNC: 312 MG/DL (ref 70–99)
GLUCOSE BLD-MCNC: 330 MG/DL (ref 70–99)
HAV IGM SER IA-ACNC: NORMAL
HBV SURFACE AB TITR SER: 550.6 MIU/ML
HDLC SERPL-MCNC: 41 MG/DL (ref 40–60)
HEPATITIS B CORE IGM ANTIBODY: NORMAL
HEPATITIS B SURFACE ANTIGEN INTERPRETATION: NORMAL
HEPATITIS C ANTIBODY INTERPRETATION: NORMAL
INR BLD: 1.03 (ref 0.86–1.14)
LDL CHOLESTEROL CALCULATED: 55 MG/DL
PERFORMED ON: ABNORMAL
PHOSPHORUS: 2.8 MG/DL (ref 2.5–4.9)
POTASSIUM SERPL-SCNC: 3.8 MMOL/L (ref 3.5–5.1)
PROTHROMBIN TIME: 11.7 SEC (ref 9.8–13)
SODIUM BLD-SCNC: 135 MMOL/L (ref 136–145)
TRIGL SERPL-MCNC: 110 MG/DL (ref 0–150)
VLDLC SERPL CALC-MCNC: 22 MG/DL

## 2018-09-18 PROCEDURE — 2580000003 HC RX 258: Performed by: INTERNAL MEDICINE

## 2018-09-18 PROCEDURE — 85610 PROTHROMBIN TIME: CPT

## 2018-09-18 PROCEDURE — 6360000002 HC RX W HCPCS: Performed by: INTERNAL MEDICINE

## 2018-09-18 PROCEDURE — 6360000002 HC RX W HCPCS: Performed by: NURSE PRACTITIONER

## 2018-09-18 PROCEDURE — 6370000000 HC RX 637 (ALT 250 FOR IP): Performed by: NURSE PRACTITIONER

## 2018-09-18 PROCEDURE — 99233 SBSQ HOSP IP/OBS HIGH 50: CPT | Performed by: NURSE PRACTITIONER

## 2018-09-18 PROCEDURE — 80074 ACUTE HEPATITIS PANEL: CPT

## 2018-09-18 PROCEDURE — 6370000000 HC RX 637 (ALT 250 FOR IP): Performed by: INTERNAL MEDICINE

## 2018-09-18 PROCEDURE — 99231 SBSQ HOSP IP/OBS SF/LOW 25: CPT | Performed by: SURGERY

## 2018-09-18 PROCEDURE — C1752 CATH,HEMODIALYSIS,SHORT-TERM: HCPCS

## 2018-09-18 PROCEDURE — 6370000000 HC RX 637 (ALT 250 FOR IP): Performed by: HOSPITALIST

## 2018-09-18 PROCEDURE — 2060000000 HC ICU INTERMEDIATE R&B

## 2018-09-18 PROCEDURE — 05HM33Z INSERTION OF INFUSION DEVICE INTO RIGHT INTERNAL JUGULAR VEIN, PERCUTANEOUS APPROACH: ICD-10-PCS | Performed by: RADIOLOGY

## 2018-09-18 PROCEDURE — B543ZZA ULTRASONOGRAPHY OF RIGHT JUGULAR VEINS, GUIDANCE: ICD-10-PCS | Performed by: RADIOLOGY

## 2018-09-18 PROCEDURE — 36556 INSERT NON-TUNNEL CV CATH: CPT

## 2018-09-18 PROCEDURE — 86706 HEP B SURFACE ANTIBODY: CPT

## 2018-09-18 PROCEDURE — 80069 RENAL FUNCTION PANEL: CPT

## 2018-09-18 PROCEDURE — 36415 COLL VENOUS BLD VENIPUNCTURE: CPT

## 2018-09-18 PROCEDURE — 76937 US GUIDE VASCULAR ACCESS: CPT

## 2018-09-18 PROCEDURE — 94660 CPAP INITIATION&MGMT: CPT

## 2018-09-18 PROCEDURE — 80061 LIPID PANEL: CPT

## 2018-09-18 PROCEDURE — 5A1D70Z PERFORMANCE OF URINARY FILTRATION, INTERMITTENT, LESS THAN 6 HOURS PER DAY: ICD-10-PCS | Performed by: INTERNAL MEDICINE

## 2018-09-18 PROCEDURE — 77001 FLUOROGUIDE FOR VEIN DEVICE: CPT

## 2018-09-18 PROCEDURE — 90937 HEMODIALYSIS REPEATED EVAL: CPT

## 2018-09-18 PROCEDURE — P9047 ALBUMIN (HUMAN), 25%, 50ML: HCPCS | Performed by: INTERNAL MEDICINE

## 2018-09-18 RX ORDER — HEPARIN SODIUM 1000 [USP'U]/ML
2600 INJECTION, SOLUTION INTRAVENOUS; SUBCUTANEOUS PRN
Status: DISCONTINUED | OUTPATIENT
Start: 2018-09-18 | End: 2018-09-25 | Stop reason: HOSPADM

## 2018-09-18 RX ORDER — MORPHINE SULFATE 2 MG/ML
2 INJECTION, SOLUTION INTRAMUSCULAR; INTRAVENOUS ONCE
Status: DISCONTINUED | OUTPATIENT
Start: 2018-09-18 | End: 2018-09-18

## 2018-09-18 RX ORDER — FENTANYL CITRATE 50 UG/ML
25 INJECTION, SOLUTION INTRAMUSCULAR; INTRAVENOUS ONCE
Status: COMPLETED | OUTPATIENT
Start: 2018-09-18 | End: 2018-09-18

## 2018-09-18 RX ORDER — ALBUMIN (HUMAN) 12.5 G/50ML
25 SOLUTION INTRAVENOUS PRN
Status: DISCONTINUED | OUTPATIENT
Start: 2018-09-18 | End: 2018-09-25 | Stop reason: HOSPADM

## 2018-09-18 RX ORDER — ASPIRIN 81 MG/1
81 TABLET ORAL DAILY
Status: DISCONTINUED | OUTPATIENT
Start: 2018-09-18 | End: 2018-09-18

## 2018-09-18 RX ORDER — INSULIN GLARGINE 100 [IU]/ML
10 INJECTION, SOLUTION SUBCUTANEOUS NIGHTLY
Status: DISCONTINUED | OUTPATIENT
Start: 2018-09-18 | End: 2018-09-25 | Stop reason: HOSPADM

## 2018-09-18 RX ADMIN — DOCUSATE SODIUM 100 MG: 100 CAPSULE, LIQUID FILLED ORAL at 10:11

## 2018-09-18 RX ADMIN — PRAVASTATIN SODIUM 40 MG: 40 TABLET ORAL at 21:42

## 2018-09-18 RX ADMIN — ASPIRIN 81 MG 81 MG: 81 TABLET ORAL at 10:12

## 2018-09-18 RX ADMIN — INSULIN LISPRO 4 UNITS: 100 INJECTION, SOLUTION INTRAVENOUS; SUBCUTANEOUS at 13:43

## 2018-09-18 RX ADMIN — LOSARTAN POTASSIUM 50 MG: 25 TABLET, FILM COATED ORAL at 10:10

## 2018-09-18 RX ADMIN — SODIUM CHLORIDE, SODIUM LACTATE, CALCIUM CHLORIDE, MAGNESIUM CHLORIDE AND DEXTROSE: 2.5; 538; 448; 18.3; 5.08 INJECTION, SOLUTION INTRAPERITONEAL at 06:00

## 2018-09-18 RX ADMIN — CARVEDILOL 25 MG: 25 TABLET, FILM COATED ORAL at 10:11

## 2018-09-18 RX ADMIN — HEPARIN SODIUM 5000 UNITS: 5000 INJECTION INTRAVENOUS; SUBCUTANEOUS at 06:44

## 2018-09-18 RX ADMIN — DILTIAZEM HYDROCHLORIDE 180 MG: 180 CAPSULE, COATED, EXTENDED RELEASE ORAL at 10:10

## 2018-09-18 RX ADMIN — SODIUM CHLORIDE, SODIUM LACTATE, CALCIUM CHLORIDE, MAGNESIUM CHLORIDE AND DEXTROSE: 2.5; 538; 448; 18.3; 5.08 INJECTION, SOLUTION INTRAPERITONEAL at 02:14

## 2018-09-18 RX ADMIN — SODIUM CHLORIDE, SODIUM LACTATE, CALCIUM CHLORIDE, MAGNESIUM CHLORIDE AND DEXTROSE: 2.5; 538; 448; 18.3; 5.08 INJECTION, SOLUTION INTRAPERITONEAL at 10:10

## 2018-09-18 RX ADMIN — ONDANSETRON HYDROCHLORIDE 4 MG: 2 INJECTION, SOLUTION INTRAMUSCULAR; INTRAVENOUS at 21:46

## 2018-09-18 RX ADMIN — ALBUMIN (HUMAN) 25 G: 0.25 INJECTION, SOLUTION INTRAVENOUS at 17:45

## 2018-09-18 RX ADMIN — INSULIN LISPRO 4 UNITS: 100 INJECTION, SOLUTION INTRAVENOUS; SUBCUTANEOUS at 10:20

## 2018-09-18 RX ADMIN — Medication 10 ML: at 10:26

## 2018-09-18 RX ADMIN — ISOSORBIDE MONONITRATE 30 MG: 30 TABLET, EXTENDED RELEASE ORAL at 10:11

## 2018-09-18 RX ADMIN — ALBUMIN (HUMAN) 25 G: 0.25 INJECTION, SOLUTION INTRAVENOUS at 18:30

## 2018-09-18 RX ADMIN — POLYETHYLENE GLYCOL 3350 17 G: 17 POWDER, FOR SOLUTION ORAL at 10:19

## 2018-09-18 RX ADMIN — CITALOPRAM HYDROBROMIDE 20 MG: 20 TABLET ORAL at 10:11

## 2018-09-18 RX ADMIN — OXYCODONE HYDROCHLORIDE AND ACETAMINOPHEN 1 TABLET: 7.5; 325 TABLET ORAL at 19:03

## 2018-09-18 RX ADMIN — Medication 10 ML: at 21:12

## 2018-09-18 RX ADMIN — PANTOPRAZOLE SODIUM 40 MG: 40 TABLET, DELAYED RELEASE ORAL at 06:44

## 2018-09-18 RX ADMIN — OXYCODONE HYDROCHLORIDE AND ACETAMINOPHEN 1 TABLET: 7.5; 325 TABLET ORAL at 10:27

## 2018-09-18 RX ADMIN — INSULIN GLARGINE 10 UNITS: 100 INJECTION, SOLUTION SUBCUTANEOUS at 21:02

## 2018-09-18 RX ADMIN — HEPARIN SODIUM 2600 UNITS: 1000 INJECTION INTRAVENOUS; SUBCUTANEOUS at 20:00

## 2018-09-18 RX ADMIN — HEPARIN SODIUM 5000 UNITS: 5000 INJECTION INTRAVENOUS; SUBCUTANEOUS at 21:01

## 2018-09-18 RX ADMIN — FENTANYL CITRATE 25 MCG: 50 INJECTION, SOLUTION INTRAMUSCULAR; INTRAVENOUS at 21:43

## 2018-09-18 RX ADMIN — POTASSIUM CHLORIDE 20 MEQ: 20 TABLET, EXTENDED RELEASE ORAL at 10:11

## 2018-09-18 RX ADMIN — TRAZODONE HYDROCHLORIDE 150 MG: 50 TABLET ORAL at 21:42

## 2018-09-18 RX ADMIN — FUROSEMIDE 60 MG: 10 INJECTION, SOLUTION INTRAMUSCULAR; INTRAVENOUS at 10:11

## 2018-09-18 ASSESSMENT — PAIN SCALES - GENERAL
PAINLEVEL_OUTOF10: 10
PAINLEVEL_OUTOF10: 9
PAINLEVEL_OUTOF10: 8
PAINLEVEL_OUTOF10: 9

## 2018-09-18 NOTE — PLAN OF CARE
Problem: Falls - Risk of:  Goal: Will remain free from falls  Will remain free from falls   Outcome: Ongoing  Pt high fall risk. Instructed to use call light before getting out of bed. Call light within reach. Bed in low position. Bed alarm on. Will continue to monitor. Problem: Pain:  Goal: Pain level will decrease  Pain level will decrease   Outcome: Ongoing  Pt states he undestands the 0-10 numeric pain scale. Pt also states that his pain is being adequately treated and will reach out to report changes. Goal: Control of acute pain  Control of acute pain   Pt states he undestands the 0-10 numeric pain scale. Pt also states that his pain is being adequately treated and will reach out to report changes.

## 2018-09-18 NOTE — PROGRESS NOTES
89.7 09/17/2018    MCH 30.1 09/17/2018    MCHC 33.6 09/17/2018    RDW 15.7 09/17/2018     09/17/2018    MPV 7.8 09/17/2018     BMP:    Lab Results   Component Value Date     09/18/2018    K 3.8 09/18/2018    K 3.2 09/17/2018    CL 96 09/18/2018    CO2 28 09/18/2018    BUN 39 09/18/2018    LABALBU 2.8 09/18/2018    CREATININE 3.5 09/18/2018    CALCIUM 8.2 09/18/2018    GFRAA 23 09/18/2018    GFRAA >60 05/17/2013    LABGLOM 19 09/18/2018    GLUCOSE 269 09/18/2018       IMPRESSION/RECOMMENDATIONS:      1- ESRD: We will continue CAPD using 2.5% Dianeal every 4 hours--> hold PD 9/18   - TDC in am and HD transition, will do temp line as pt's last dose of plavix was 9/17   -OP HDU - CCM consulted   -HD for 2.5h on 9/18  2- Leaking Tenckhoff catheter: seen by Dr. Terra Hand from surgery and plans for new catheter once off plavix  3- Hypokalemia: S/p PRN potassium replacement. 4- HTN: Blood pressure remains elevated, we will increase Coreg and Cozaar for better control. 5- Anemia: will start DAVON and monitor.

## 2018-09-18 NOTE — PROGRESS NOTES
[facility]    PATIENT NAME: Jasson Palomares     TODAY'S DATE: 9/18/2018    CHIEF COMPLAINT: none    INTERVAL HISTORY/HPI:    Pt with minimal drainage around PD cath. No abd pain or nausea. No fevers or chills. REVIEW OF SYSTEMS:  Pertinent positives and negatives as per interval history section    OBJECTIVE:  VITALS:  /61   Pulse 69   Temp 98.6 °F (37 °C) (Oral)   Resp 16   Ht 5' 8\" (1.727 m)   Wt 216 lb 12.8 oz (98.3 kg) Comment: dwelling  SpO2 94%   BMI 32.96 kg/m²     INTAKE/OUTPUT:    I/O last 3 completed shifts: In: 47336 [P.O.:1780; HROKA:66558]  Out: 06454 [Urine:580; ZFZGK:08787]  I/O this shift:  In: 2000 [Other:2000]  Out: 2300 [Other:2300]    CONSTITUTIONAL:  awake and alert  ABDOMEN:  normal bowel sounds, soft, non-distended, non-tender, PD cath secure    Data:  CBC:   Recent Labs      09/17/18   0506   WBC  11.5*   HGB  10.6*   HCT  31.6*   PLT  255     BMP:    Recent Labs      09/16/18   1634  09/17/18   0506  09/18/18   0454   NA  137  135*  135*   K  3.4*  3.2*  3.8   CL  97*  95*  96*   CO2  28  29  28   BUN  50*  43*  39*   CREATININE  3.0*  3.1*  3.5*   GLUCOSE  279*  277*  269*     Hepatic: No results for input(s): AST, ALT, ALB, BILITOT, ALKPHOS in the last 72 hours. Mag:      Recent Labs      09/16/18   1634  09/17/18   0506   MG  2.00  1.80      Phos:     Recent Labs      09/18/18   0454   PHOS  2.8      INR:   Recent Labs      09/18/18   1244   INR  1.03       Radiology Review:  *Imaging personally reviewed by me. NA      ASSESSMENT AND PLAN:  45 yo with leaking around PD catheter  1. Continue to hold plavix  2. PD catheter placement after HD catheter placed.     [facility]   [id number]

## 2018-09-18 NOTE — PROGRESS NOTES
Diabetes education provided today:    Nutrition as a mainstream of diabetes therapy. [place] about label reading. Carbs: good carbs and bad carbs, importance of carb counting, incorporation of protein with each meal to reduce Glycemic index, importance of portions, Carb/insulin ratio. Managing high and low sugar readings.

## 2018-09-18 NOTE — PROGRESS NOTES
Vitals:    09/18/18 0809   BP: 122/61   Pulse: 73   Resp: 16   Temp: 98.6 °F (37 °C)   SpO2: 94%     Pt resting quietly in bed alert and oriented. Pt had no acute events overnight. Pt having pain rated 8/10 at this time and denies having further needs. Patient is PD patient and currently dwelling. Bed locked in lowest position, call light within reach, bedside table within reach. Will continue to monitor.

## 2018-09-18 NOTE — CARE COORDINATION
Referral made to [facility] for OP HD Preferable schedule MW,F, afternoons. Will follow for chair time.

## 2018-09-18 NOTE — PROGRESS NOTES
09/18/18 0000   NIV Type   Equipment Type V60   Mode CPAP   Mask Type Full face mask   Mask Size Medium   Settings/Measurements   Comfort Level Good   Using Accessory Muscles No   CPAP 10 cmH2O   Resp 20   SpO2 97   FiO2  97 %   Vt Exhaled 618 mL   Mask Leak (lpm) 31 lpm   Skin Protection for O2 Device Yes   Breath Sounds   Right Upper Lobe Clear;Diminished   Right Middle Lobe Diminished   Right Lower Lobe Diminished   Left Upper Lobe Clear;Diminished   Left Lower Lobe Diminished   Patient Observation   Observations mepilex on nose   Alarm Settings   Alarms On Y   Press Low Alarm 6 cmH2O   High Pressure Alarm 20 cmH2O   Apnea (secs) 20 secs   Resp Rate Low Alarm 6   High Respiratory Rate 45 br/min

## 2018-09-18 NOTE — PROGRESS NOTES
lesions, without obvious abnormality. Neck: no adenopathy, no carotid bruit, no JVD, supple, symmetrical, trachea midline and thyroid not enlarged, symmetric, no tenderness/mass/nodules  Lungs: clear to auscultation bilaterally  Heart: regular rate and rhythm, S1, S2 normal, no murmur, click, rub or gallop  Abdomen: protuberent, left lower quadrant peritoneal dialysis catherter noted, audible bowel sounds  Extremities: 2+ pitting edema to mid shin, neurovascular status intact  Neurologic: Mental status: Alert, oriented, thought content appropriate    Assessment and Plan:   1. Combined systolic and diastolic heart failure, NYHA class 4:  Volume retention due to ESRD and inadequate peritoneal dialysis admitted to inpatient status. Intravenous furosemide 60 mg IV BID and nephrology evaluation initiated. 2. ESRD: Resume 2.5% Dianeal CAPD every 4 hours. Will need to transfer to  for CAPD. Surgery evaluation of leaking Tenckhoff catheter. He has stopped smoking in order to get on the kidney transplant list. Plan for Tunnel cath and changing PD catheter, need to hold Plavix, if OK with Cardiology. Holding Plavix for plan for Tunnel catheter and repositioning of the peritoneal dialysis catheter. 3. Hypokalemia with normal magnesium:  Oral potassium replacement, monitor magnesium and potassium. 4. Chronic anemia of renal failure:  Above target for DAVON. 5. Acute Diastolic Heart Failure (HFpEF) and now heart failure with reduced ejection fraction (40-45%) with moderate aortic stenosis. 6. History of CAD with elevated troponin (patent LAD stent and mild residual nonobstructive disease by January 2017 Cath) continues on aspirin 81 mg, Plavix 75 mg, carvedilol 12.5 mg BID, and atorvastatin 40 mg nightly. Appreciate cardiology evaluation. 7. Aortic stenosis with bicuspid aortic valve.     TTE artic valve area is calculated at 0.95 cm2 with a maximum pressure  gradient of 61 mmHg and a mean pressure gradient of 27

## 2018-09-18 NOTE — BRIEF OP NOTE
Brief Postoperative Note    Jared Ortiz  YOB: 1970  [id number]    Pre-operative Diagnosis: renal failure. Peritoneal dialysis catheter not function. Recent plavix and aspirin so inability to tunnel catheter. Post-operative Diagnosis: Same    Procedure: US and fluoro RIJ temporary hemodialysis catheter placement    Anesthesia: Local    Surgeons/Assistants: Dr. Jordana Milan    Estimated Blood Loss: less than 5ml     Complications: None    Specimens: Was Not Obtained    Findings: 16cm RIJ temporary HD catheter with tip in upper right atirum. Aspirated, flushed and locked with heparin. Okay to use.     Electronically signed by Asif Long MD on 9/18/2018 at 3:51 PM

## 2018-09-18 NOTE — PROGRESS NOTES
potassium chloride  20 mEq Oral BID WC    pantoprazole  40 mg Oral QAM AC    dianeal lo-trever 2.5%   Intraperitoneal Q4H    citalopram  20 mg Oral Daily    diltiazem  180 mg Oral Daily    isosorbide mononitrate  30 mg Oral Daily    traZODone  150 mg Oral Nightly    insulin lispro  0-6 Units Subcutaneous TID WC    insulin lispro  0-3 Units Subcutaneous Nightly    furosemide  60 mg Intravenous BID    sodium chloride flush  10 mL Intravenous 2 times per day    heparin (porcine)  5,000 Units Subcutaneous 3 times per day      dextrose         Lab Data:  CBC:   Recent Labs      09/17/18   0506   WBC  11.5*   HGB  10.6*   PLT  255     BMP:    Recent Labs      09/16/18   1634  09/17/18   0506  09/18/18   0454   NA  137  135*  135*   K  3.4*  3.2*  3.8   CO2  28  29  28   BUN  50*  43*  39*   CREATININE  3.0*  3.1*  3.5*     INR:  No results for input(s): INR in the last 72 hours. BNP:    No results for input(s): PROBNP in the last 72 hours. Cardiac Enzymes:   No results for input(s): TROPONINI in the last 72 hours. Lipids:   Lab Results   Component Value Date    TRIG 107 05/22/2017    TRIG 118 01/22/2017    HDL 36 05/22/2017    HDL 40 01/22/2017    LDLCALC 72 05/22/2017    LDLCALC 130 01/22/2017    LDLDIRECT 199 09/04/2014    LDLDIRECT 172 03/07/2014       Cardiac Imaging:   Dobutamine stress echo 9/17/18:    Baseline nonspecific ST changes. PVC's during infusion. Patient developed bilateral jaw tightness 4/10, likely related to Dobutamine. Symptoms resolved with rest.   No EKG changes of stress induced left ventricular ischemia. Dobutamine stress echo shows normal baseline EF at 55%. No regional wall motion abnormality at rest or post dobutamine infusion. Normal hyperdynamic response to dobutamine stress. Definity contrast is used. Normal dobutamine stress echocardiogram.    Echo 9/15/18: The left ventricular systolic function is mildly reduced with an ejection fraction of 40 - 45 %.     There is 2015   -most recent [address] [facility] Jan 2017 with patent stent   -chest pain not consistent with angina, re-evaluate after diuresis   -elevated troponin but is setting of ESRD not having received any dialysis (HD or PD) for 1 week   -continue ASA, statin, BB   -okay to hold Plavix for 5 days for HD catheter and PD catheter placement (last dose 9/17/18)  ~HTN   -improved   -coreg and losartan dose increased   -monitor  ~ESRD   -transitioned from HD to PD about 1 month ago, PD catheter leaking   -to have TD catheter placed for HD    -per nephrology  ~HLD   -atorvastatin changed to pravastatin due to diarrhea  ~Nicotine dependence   -non-smoker X8 days, denies need for replacement or assistance med    Okay to hold plavix for 5 days for HD +/or PD catheter placement. IR requested aspirin to be held for 5 days prior to HD catheter placement. Recommend continue ASA 81 mg daily. Benefit of continuing low dose aspirin outweighs risk in this low risk procedure.        JAY Saravia - CNP, 9/18/2018, 10:35 AM

## 2018-09-19 LAB
ALBUMIN SERPL-MCNC: 3.2 G/DL (ref 3.4–5)
ANION GAP SERPL CALCULATED.3IONS-SCNC: 9 MMOL/L (ref 3–16)
BUN BLDV-MCNC: 23 MG/DL (ref 7–20)
CALCIUM SERPL-MCNC: 8.3 MG/DL (ref 8.3–10.6)
CHLORIDE BLD-SCNC: 98 MMOL/L (ref 99–110)
CO2: 26 MMOL/L (ref 21–32)
CREAT SERPL-MCNC: 2.9 MG/DL (ref 0.9–1.3)
GFR AFRICAN AMERICAN: 28
GFR NON-AFRICAN AMERICAN: 23
GLUCOSE BLD-MCNC: 143 MG/DL (ref 70–99)
GLUCOSE BLD-MCNC: 145 MG/DL (ref 70–99)
GLUCOSE BLD-MCNC: 177 MG/DL (ref 70–99)
GLUCOSE BLD-MCNC: 184 MG/DL (ref 70–99)
GLUCOSE BLD-MCNC: 227 MG/DL (ref 70–99)
PERFORMED ON: ABNORMAL
PHOSPHORUS: 2.5 MG/DL (ref 2.5–4.9)
POTASSIUM SERPL-SCNC: 4.5 MMOL/L (ref 3.5–5.1)
SODIUM BLD-SCNC: 133 MMOL/L (ref 136–145)

## 2018-09-19 PROCEDURE — 6370000000 HC RX 637 (ALT 250 FOR IP): Performed by: HOSPITALIST

## 2018-09-19 PROCEDURE — 80069 RENAL FUNCTION PANEL: CPT

## 2018-09-19 PROCEDURE — 6360000002 HC RX W HCPCS: Performed by: INTERNAL MEDICINE

## 2018-09-19 PROCEDURE — 6370000000 HC RX 637 (ALT 250 FOR IP): Performed by: INTERNAL MEDICINE

## 2018-09-19 PROCEDURE — 6370000000 HC RX 637 (ALT 250 FOR IP): Performed by: NURSE PRACTITIONER

## 2018-09-19 PROCEDURE — 90937 HEMODIALYSIS REPEATED EVAL: CPT

## 2018-09-19 PROCEDURE — 99232 SBSQ HOSP IP/OBS MODERATE 35: CPT | Performed by: SURGERY

## 2018-09-19 PROCEDURE — 99232 SBSQ HOSP IP/OBS MODERATE 35: CPT | Performed by: NURSE PRACTITIONER

## 2018-09-19 PROCEDURE — 36415 COLL VENOUS BLD VENIPUNCTURE: CPT

## 2018-09-19 PROCEDURE — 2060000000 HC ICU INTERMEDIATE R&B

## 2018-09-19 PROCEDURE — P9047 ALBUMIN (HUMAN), 25%, 50ML: HCPCS | Performed by: INTERNAL MEDICINE

## 2018-09-19 PROCEDURE — 2580000003 HC RX 258: Performed by: INTERNAL MEDICINE

## 2018-09-19 RX ORDER — SENNA PLUS 8.6 MG/1
1 TABLET ORAL NIGHTLY
Status: DISCONTINUED | OUTPATIENT
Start: 2018-09-19 | End: 2018-09-21

## 2018-09-19 RX ORDER — TORSEMIDE 20 MG/1
20 TABLET ORAL DAILY
Status: DISCONTINUED | OUTPATIENT
Start: 2018-09-19 | End: 2018-09-25 | Stop reason: HOSPADM

## 2018-09-19 RX ORDER — CARVEDILOL 6.25 MG/1
12.5 TABLET ORAL 2 TIMES DAILY WITH MEALS
Status: DISCONTINUED | OUTPATIENT
Start: 2018-09-19 | End: 2018-09-20

## 2018-09-19 RX ADMIN — CARVEDILOL 12.5 MG: 6.25 TABLET, FILM COATED ORAL at 18:32

## 2018-09-19 RX ADMIN — POLYETHYLENE GLYCOL 3350 17 G: 17 POWDER, FOR SOLUTION ORAL at 08:56

## 2018-09-19 RX ADMIN — OXYCODONE HYDROCHLORIDE AND ACETAMINOPHEN 1 TABLET: 7.5; 325 TABLET ORAL at 05:53

## 2018-09-19 RX ADMIN — OXYCODONE HYDROCHLORIDE AND ACETAMINOPHEN 1 TABLET: 7.5; 325 TABLET ORAL at 23:32

## 2018-09-19 RX ADMIN — OXYCODONE HYDROCHLORIDE AND ACETAMINOPHEN 1 TABLET: 7.5; 325 TABLET ORAL at 10:00

## 2018-09-19 RX ADMIN — Medication 10 ML: at 21:19

## 2018-09-19 RX ADMIN — ALBUMIN (HUMAN) 25 G: 0.25 INJECTION, SOLUTION INTRAVENOUS at 13:30

## 2018-09-19 RX ADMIN — DILTIAZEM HYDROCHLORIDE 180 MG: 180 CAPSULE, COATED, EXTENDED RELEASE ORAL at 08:55

## 2018-09-19 RX ADMIN — TORSEMIDE 20 MG: 20 TABLET ORAL at 21:19

## 2018-09-19 RX ADMIN — PANTOPRAZOLE SODIUM 40 MG: 40 TABLET, DELAYED RELEASE ORAL at 05:53

## 2018-09-19 RX ADMIN — CARVEDILOL 25 MG: 25 TABLET, FILM COATED ORAL at 08:56

## 2018-09-19 RX ADMIN — CITALOPRAM HYDROBROMIDE 20 MG: 20 TABLET ORAL at 08:55

## 2018-09-19 RX ADMIN — INSULIN GLARGINE 10 UNITS: 100 INJECTION, SOLUTION SUBCUTANEOUS at 21:17

## 2018-09-19 RX ADMIN — PRAVASTATIN SODIUM 40 MG: 40 TABLET ORAL at 21:16

## 2018-09-19 RX ADMIN — ISOSORBIDE MONONITRATE 30 MG: 30 TABLET, EXTENDED RELEASE ORAL at 08:55

## 2018-09-19 RX ADMIN — HEPARIN SODIUM 5000 UNITS: 5000 INJECTION INTRAVENOUS; SUBCUTANEOUS at 05:53

## 2018-09-19 RX ADMIN — HEPARIN SODIUM 5000 UNITS: 5000 INJECTION INTRAVENOUS; SUBCUTANEOUS at 21:17

## 2018-09-19 RX ADMIN — POTASSIUM CHLORIDE 20 MEQ: 20 TABLET, EXTENDED RELEASE ORAL at 08:56

## 2018-09-19 RX ADMIN — ONDANSETRON HYDROCHLORIDE 4 MG: 2 INJECTION, SOLUTION INTRAMUSCULAR; INTRAVENOUS at 11:30

## 2018-09-19 RX ADMIN — TRAZODONE HYDROCHLORIDE 150 MG: 50 TABLET ORAL at 21:16

## 2018-09-19 RX ADMIN — HEPARIN SODIUM 2600 UNITS: 1000 INJECTION INTRAVENOUS; SUBCUTANEOUS at 18:38

## 2018-09-19 RX ADMIN — HEPARIN SODIUM 5000 UNITS: 5000 INJECTION INTRAVENOUS; SUBCUTANEOUS at 18:31

## 2018-09-19 RX ADMIN — DOCUSATE SODIUM 100 MG: 100 CAPSULE, LIQUID FILLED ORAL at 21:16

## 2018-09-19 RX ADMIN — FUROSEMIDE 60 MG: 10 INJECTION, SOLUTION INTRAMUSCULAR; INTRAVENOUS at 08:55

## 2018-09-19 RX ADMIN — OXYCODONE HYDROCHLORIDE AND ACETAMINOPHEN 1 TABLET: 7.5; 325 TABLET ORAL at 18:35

## 2018-09-19 RX ADMIN — LOSARTAN POTASSIUM 50 MG: 25 TABLET, FILM COATED ORAL at 08:56

## 2018-09-19 RX ADMIN — INSULIN LISPRO 1 UNITS: 100 INJECTION, SOLUTION INTRAVENOUS; SUBCUTANEOUS at 21:18

## 2018-09-19 RX ADMIN — Medication 10 ML: at 08:56

## 2018-09-19 RX ADMIN — SENNOSIDES 8.6 MG: 8.6 TABLET, FILM COATED ORAL at 21:16

## 2018-09-19 RX ADMIN — DOCUSATE SODIUM 100 MG: 100 CAPSULE, LIQUID FILLED ORAL at 08:55

## 2018-09-19 ASSESSMENT — PAIN DESCRIPTION - DESCRIPTORS
DESCRIPTORS: THROBBING;SHOOTING;STABBING
DESCRIPTORS: SHOOTING;THROBBING
DESCRIPTORS: SHOOTING;THROBBING

## 2018-09-19 ASSESSMENT — PAIN DESCRIPTION - ONSET
ONSET: OTHER (COMMENT)

## 2018-09-19 ASSESSMENT — PAIN DESCRIPTION - PAIN TYPE
TYPE: ACUTE PAIN;CHRONIC PAIN
TYPE: ACUTE PAIN;CHRONIC PAIN;SURGICAL PAIN
TYPE: CHRONIC PAIN
TYPE: ACUTE PAIN
TYPE: CHRONIC PAIN

## 2018-09-19 ASSESSMENT — PAIN SCALES - GENERAL
PAINLEVEL_OUTOF10: 8
PAINLEVEL_OUTOF10: 8
PAINLEVEL_OUTOF10: 9
PAINLEVEL_OUTOF10: 8
PAINLEVEL_OUTOF10: 9
PAINLEVEL_OUTOF10: 10
PAINLEVEL_OUTOF10: 10
PAINLEVEL_OUTOF10: 3
PAINLEVEL_OUTOF10: 8
PAINLEVEL_OUTOF10: 10

## 2018-09-19 ASSESSMENT — PAIN DESCRIPTION - FREQUENCY
FREQUENCY: CONTINUOUS

## 2018-09-19 ASSESSMENT — PAIN DESCRIPTION - LOCATION
LOCATION: NECK
LOCATION: BACK

## 2018-09-19 ASSESSMENT — PAIN DESCRIPTION - PROGRESSION: CLINICAL_PROGRESSION: NOT CHANGED

## 2018-09-19 NOTE — PROGRESS NOTES
dizziness, increased fatigue, decreased or difficulty urinating. Pt verbalizes understanding. No additional questions at this time.     Education Time: 15 Minutes

## 2018-09-19 NOTE — PROGRESS NOTES
Hospitalist Progress Note  9/19/2018 2:51 PM  Subjective:   Admit Date: 9/14/2018  PCP: Felipe Jj MD Status: Inpatient  Interval History: Hospital Day: 6, admitted with dyspnea and pulmonary edema. Less dyspnea. Difficulty sleeping without home CPAP. Appetite reduced. Plan for temporary hemodialysis catheter, holding  Plavix for tunneled cath and repositioning of peritoneal dialysis. History of present illness:   46 y.o. male with ESRD who had been on hemodialysis since earlier in the year (does not remember exactly) who transitioned to peritoneal dialysis a month ago with increased leaking of dialysis catheter. Renal failure apparently a result of contrast nephropathy and diabetes. Right chest tunneled catheter removed a week ago. He also has a left arm fistula that is not functioning. He experienced progressive dyspnea with swelling over the past week. History of COPD diagnosed five years ago and followed by Dr. Edgar Mcarthur. He quit smoking a week ago after 60 pk/yr history of smoking. He is on multiple inhalers and nebulizer at home but does not remember exactly which ones. Appears to be Duonebs and albuterol MDI. History of type 2 diabetes and he has not been on insulin or any other medication since starting hemodilalysis    DIET CARB CONTROL; Low Sodium (2 GM);  Daily Fluid Restriction: 1500 ml - NPO after midnight  Medications:     carvedilol  25 mg Oral BID WC   losartan  50 mg Oral Daily   ipratropium-albuterol  1 vial Inhalation Q4H   dianeal lo-trever 2.5%   Intraperitoneal Q4H   aspirin  81 mg Oral Daily   atorvastatin  40 mg Oral Nightly   citalopram  20 mg Oral Daily   clopidogrel  75 mg Oral Daily   diltiazem  180 mg Oral Daily   isosorbide mononitrate  30 mg Oral Daily   traZODone  150 mg Oral Nightly   insulin lispro SSI   0-6 Units Subcutaneous TID WC / HS   furosemide  60 mg Intravenous BID (9/14, day #3)   heparin (porcine)  5,000 Units Subcutaneous 3 times per day     Recent Labs      09/17/18   0506   WBC  11.5*   HGB  10.6*   PLT  255   MCV  89.7     Recent Labs      09/17/18   0506  09/18/18   0454  09/19/18   0515   NA  135*  135*  133*   K  3.2*  3.8  4.5   CL  95*  96*  98*   CO2  29  28  26   BUN  43*  39*  23*   CREATININE  3.1*  3.5*  2.9*   GLUCOSE  277*  269*  143*     Troponin T:   No results for input(s): TROPONINI in the last 72 hours. Magnesium: 2.30 m/gdl    Echocardiogram:  The left ventricular systolic function is mildly reduced with an ejection fraction of 40-45%.   There is global hypokinesis of the left ventricle. Normal left ventricular size with mild concentric left ventricular hypertrophy. Grade II diastolic dysfunction with elevated filling pressure. Changes noted from previous echo on 10-. Mild mitral regurgitation. The left atrium is moderately dilated. The aortic valve is thickened/calcified with decreased leaflet mobility consistent with aortic stenosis. The aortic valve area is calculated at 0.95 cm2 with a maximum pressure  gradient of 61 mmHg and a mean pressure gradient of 27 mmHg. This is c/w moderate aortic stenosis. Mild aortic regurgitation. Consider dobutamine stress echo to assess for low flow low gradient aortic stenosis.     PA/lat CXR: Cardiomediastinal silhouette is unchanged. No pneumothorax or effusion. Mild patchy opacities in the left peripheral base. No acute osseous abnormality. Mild left basilar airspace disease could represent atelectasis or pneumonia.      EKG: Normal sinus rhythm. Prolonged QTc 474msec.  hen compared with ECG of 09-AUG-2018 20:57, Sinus rhythm has replaced Ectopic atrial rhythm [facility] [employer]).       Objective:   Vitals:  /76   Pulse 93   Temp 97.7 °F (oral)   Resp 16   Ht 5' 8\" (1.727 m)   Wt 205 lb (93 kg)   SpO2 94%   BMI 31.17 kg/m²   General appearance: alert, appears stated age and cooperative  Skin: Skin color, texture, turgor normal. No rashes or lesions  HEENT: Head: Normocephalic, no lesions, without obvious abnormality. Neck: no adenopathy, no carotid bruit, no JVD, supple, symmetrical, trachea midline and thyroid not enlarged, symmetric, no tenderness/mass/nodules  Lungs: clear to auscultation bilaterally  Heart: regular rate and rhythm, S1, S2 normal, no murmur, click, rub or gallop  Abdomen: protuberent, left lower quadrant peritoneal dialysis catherter noted, audible bowel sounds  Extremities: 2+ pitting edema to mid shin, neurovascular status intact  Neurologic: Mental status: Alert, oriented, thought content appropriate    Assessment and Plan:   1. Combined systolic and diastolic heart failure, NYHA class 4:  Volume retention due to ESRD and inadequate peritoneal dialysis admitted to inpatient status. Intravenous furosemide 60 mg IV BID and nephrology evaluation initiated. 2. ESRD: Resume 2.5% Dianeal CAPD every 4 hours. Will need to transfer to  for CAPD. Surgery evaluation of leaking Tenckhoff catheter. He has stopped smoking in order to get on the kidney transplant list. Plan for Tunnel cath and changing PD catheter, need to hold Plavix, if OK with Cardiology. Holding Plavix for plan for Tunnel catheter and repositioning of the peritoneal dialysis catheter. Patient had a temporary central catheter placed for emergent hemodialysis need. 3. Hypokalemia with normal magnesium:  Oral potassium replacement, monitor magnesium and potassium. 4. Chronic anemia of renal failure:  Above target for DAVON. 5. Acute Diastolic Heart Failure (HFpEF) and now heart failure with reduced ejection fraction (40-45%) with moderate aortic stenosis. 6. History of CAD with elevated troponin (patent LAD stent and mild residual nonobstructive disease by January 2017 Cath) continues on aspirin 81 mg, Plavix 75 mg, carvedilol 12.5 mg BID, and atorvastatin 40 mg nightly. Appreciate cardiology evaluation. 7. Aortic stenosis with bicuspid aortic valve.     TTE artic valve area is calculated at 0.95 cm2 with a maximum pressure  gradient of 61 mmHg and a mean pressure gradient of 27 mmHg. CT surgery evaluation pending dobutamine stress test.   8. Type 2 Diabetes (uncontrolled, HgbA1c 7.4% 3/19/18): Cover with a \"sliding scale\" lispro moderate scale prandial correction insulin. Controlled carbohydrate diet. 9. Obstructive sleep apnea treated with CPAP from home. 10. Hypertension continued on home meds. 11. Nicotine dependence:  Declined nicotine replacement therapy at this time.       Advance Directive: Full code, confirmed by patient. DVT prophylaxis with heparin 5,000 unit sub-Q three times per day. Discharge planning: Will require at least another two days inpatient status.       Rafaela Horton MD  [facility]

## 2018-09-19 NOTE — PROGRESS NOTES
moderate aortic stenosis. Color flow demonstrates eccentric jet suggesting mild aortic regurgitation.   Trace mitral and tricuspid regurgitation. Systolic pulmonary artery pressure (SPAP) is normal and estimated at 22 mmHg (RA pressure 3 mmHg).   There is mild concentric left ventricular hypertrophy. MPI 6/18/15: There is a very small and mild fixed posterior-basal defect consistent with  fibrosis. There is no evidence for ischemia. Left ventricular function is reduced with and estimated LVEF of 40%. The LV is severely dilated. CATH: 5/26/15, Dr. Ghazala Singh  LM <20%   LAD 70% mid. FFR 0.77  D1 50% prox  Cx 20%  RCA 20%  LVEDP 22mmHg  RA 9, RV 42/10, PA 44/16/31, W 18  PA sat 63%    PTCA LAD  3.0 x 15 PATRICIA  prox portion post 3.5 x 8 NC balloon    DAPT, statin  Resume lasix and ARB at discharge provided Cr stable  Renal fxn will not tolerate higher dose of lasix than 40 daily  Needs smoking cessation, weight loss, exercise  Rec OP sleep eval         Principal Problem:    Acute on chronic combined systolic and diastolic congestive heart failure (HCC)  Active Problems:    Acute combined systolic and diastolic CHF, NYHA class 4 (HCC)    Renovascular hypertension    Mixed hyperlipidemia    Cigarette nicotine dependence in remission  Resolved Problems:    * No resolved hospital problems.  *      Plan:  ~CHF, acute on chronic diastolic, new systolic/ LV dysfunction   -remains fluid overloaded- improving   -currently undergoing PD continuously, to transition to HD when able to get HD catheter placed   -also on IV Lasix with fair urine output   -1500 ml fluid restriction, no added salt diet, daily weights, i/o, daily renal panel, CHF nurse consult   ~Aortic stenosis   -moderate by recent echo, to be further evaluated for low flow low gradient   -Dobt stress echo - study not done for AoV evaluation   -can consider repeating dobt stress echo for aortic valve evaluation but doubt significant difference with mild LV dysfunction  ~CAD, s/p PCI to mid LAD 2015   -most recent [facility] Jan 2017 with patent stent   -chest pain not consistent with angina, re-evaluate after diuresis   -elevated troponin but is setting of ESRD not having received any dialysis (HD or PD) for 1 week   -continue ASA, statin, BB   -okay to hold Plavix for 5 days for HD catheter and PD catheter placement (last dose 9/17/18)  ~HTN   -improved and now with hypotension on HD   -coreg and losartan   -monitor  ~ESRD   -transitioned from HD to PD about 1 month ago, PD catheter leaking   -had TD catheter placed for HD    -per nephrology  ~HLD   -atorvastatin changed to pravastatin due to diarrhea  ~Nicotine dependence   -non-smoker X9 days    Okay to hold plavix for 5 days for HD +/or PD catheter placement. IR requested aspirin to be held for 5 days prior to HD catheter placement. Recommend to continue ASA 81 mg daily. Benefit of continuing low dose aspirin outweighs risk in this low risk procedure.      JAY Olguin - CNP, 9/19/2018, 4:36 PM

## 2018-09-19 NOTE — PROGRESS NOTES
[facility] SURGERY    PATIENT NAME: Hema Martinez     TODAY'S DATE: 9/19/2018    CHIEF COMPLAINT: leaking pd cath, edema    INTERVAL HISTORY/HPI:    Pt with BLE edema, pain at HD cath site, mild leakage around PD cath no abd pain. REVIEW OF SYSTEMS:  Pertinent positives and negatives as per interval history section    OBJECTIVE:  VITALS:  /61   Pulse 89   Temp 99.3 °F (37.4 °C) (Oral)   Resp 16   Ht 5' 8\" (1.727 m)   Wt 205 lb 12.8 oz (93.4 kg)   SpO2 92%   BMI 31.29 kg/m²     INTAKE/OUTPUT:    I/O last 3 completed shifts: In: 2880 [P.O.:480; Other:2000]  Out: 5600 [Urine:300; Other:2300]  No intake/output data recorded. CONSTITUTIONAL:  awake and alert  LUNGS:  Respirations easy and unlabored  CARD:  regular rate and rhythm  ABDOMEN:  normal bowel sounds, soft, non-distended, non-tender     Data:  CBC:   Recent Labs      09/17/18   0506   WBC  11.5*   HGB  10.6*   HCT  31.6*   PLT  255     BMP:    Recent Labs      09/17/18   0506  09/18/18   0454  09/19/18   0515   NA  135*  135*  133*   K  3.2*  3.8  4.5   CL  95*  96*  98*   CO2  29  28  26   BUN  43*  39*  23*   CREATININE  3.1*  3.5*  2.9*   GLUCOSE  277*  269*  143*     Hepatic: No results for input(s): AST, ALT, ALB, BILITOT, ALKPHOS in the last 72 hours. Mag:      Recent Labs      09/16/18   1634  09/17/18   0506   MG  2.00  1.80      Phos:     Recent Labs      09/18/18   0454  09/19/18   0515   PHOS  2.8  2.5      INR:   Recent Labs      09/18/18   1244   INR  1.03       Radiology Review:  *Imaging personally reviewed by me. NA      ASSESSMENT AND PLAN:  47 yo with leaking around PD catheter  1. Plan for PD catheter replacement Friday  2.  Continue to hold plavix    [facility]   [id number]

## 2018-09-19 NOTE — PROGRESS NOTES
Department of Internal Medicine  Nephrology Progress Note        We are following this patient for ESRD. Sub/interval history  HD later today,pt c/o tightness on his legs    HD on 9/18 done, c/b by hypotension     ROS: No chest pain/shortness of breath/fever/nausea/vomiting  PSFH: No visitor    Scheduled Meds:   [START ON 9/21/2018] ceFAZolin  2 g Intravenous On Call to OR    senna  1 tablet Oral Nightly    insulin glargine  10 Units Subcutaneous Nightly    pravastatin  40 mg Oral Nightly    polyethylene glycol  17 g Oral Daily    docusate sodium  100 mg Oral BID    carvedilol  25 mg Oral BID WC    losartan  50 mg Oral Daily    potassium chloride  20 mEq Oral BID WC    pantoprazole  40 mg Oral QAM AC    citalopram  20 mg Oral Daily    diltiazem  180 mg Oral Daily    isosorbide mononitrate  30 mg Oral Daily    traZODone  150 mg Oral Nightly    insulin lispro  0-6 Units Subcutaneous TID WC    insulin lispro  0-3 Units Subcutaneous Nightly    furosemide  60 mg Intravenous BID    sodium chloride flush  10 mL Intravenous 2 times per day    heparin (porcine)  5,000 Units Subcutaneous 3 times per day     Continuous Infusions:   dextrose       PRN Meds:.albumin human, heparin (porcine), perflutren lipid microspheres, acetaminophen, oxyCODONE-acetaminophen, albuterol sulfate HFA, calcium carbonate, glucose, dextrose, glucagon (rDNA), dextrose, nitroGLYCERIN, sodium chloride flush, ondansetron    Physical Exam:    VITALS:  /71   Pulse 83   Temp 99.6 °F (37.6 °C) (Oral)   Resp 16   Ht 5' 8\" (1.727 m)   Wt 205 lb 12.8 oz (93.4 kg)   SpO2 92%   BMI 31.29 kg/m²     General appearance: Seems comfortable, no acute distress. Neck: Trachea midline, thyroid normal.   Lungs:  Non labored breathing, CTA to anterior auscultation. Heart:  S1S2 normal, rub or gallop. + peripheral edema. Abdomen: Soft, non-tender, no organomegaly. Skin: No lesions or rashes, warm to touch.      DATA:    CBC:   Lab Results   Component Value Date    WBC 11.5 09/17/2018    RBC 3.53 09/17/2018    HGB 10.6 09/17/2018    HCT 31.6 09/17/2018    MCV 89.7 09/17/2018    MCH 30.1 09/17/2018    MCHC 33.6 09/17/2018    RDW 15.7 09/17/2018     09/17/2018    MPV 7.8 09/17/2018     BMP:    Lab Results   Component Value Date     09/19/2018    K 4.5 09/19/2018    K 3.2 09/17/2018    CL 98 09/19/2018    CO2 26 09/19/2018    BUN 23 09/19/2018    LABALBU 3.2 09/19/2018    CREATININE 2.9 09/19/2018    CALCIUM 8.3 09/19/2018    GFRAA 28 09/19/2018    GFRAA >60 05/17/2013    LABGLOM 23 09/19/2018    GLUCOSE 143 09/19/2018       IMPRESSION/RECOMMENDATIONS:      1- ESRD: We will continue CAPD using 2.5% Dianeal every 4 hours--> hold PD 9/18   -s/p temp line on 9/18 as pt's last dose of plavix was 9/17   -OP HDU - CCM consulted   -HD initiated from 9/18/18   -HD on 9/19 for 4h  2- Leaking Tenckhoff catheter: seen by Dr. Thuy Wharton from surgery and plans for new catheter once off plavix on 9/21/18 noted  3- Hypokalemia: S/p PRN potassium replacement. 4- HTN: bP low, meds adjusted and hold before HD  5- Anemia: DAVON and monitor.

## 2018-09-20 LAB
ALBUMIN SERPL-MCNC: 3.4 G/DL (ref 3.4–5)
ANION GAP SERPL CALCULATED.3IONS-SCNC: 9 MMOL/L (ref 3–16)
BASOPHILS ABSOLUTE: 0.1 K/UL (ref 0–0.2)
BASOPHILS RELATIVE PERCENT: 0.8 %
BUN BLDV-MCNC: 20 MG/DL (ref 7–20)
CALCIUM SERPL-MCNC: 8.3 MG/DL (ref 8.3–10.6)
CHLORIDE BLD-SCNC: 99 MMOL/L (ref 99–110)
CO2: 28 MMOL/L (ref 21–32)
CREAT SERPL-MCNC: 2.6 MG/DL (ref 0.9–1.3)
EOSINOPHILS ABSOLUTE: 0.2 K/UL (ref 0–0.6)
EOSINOPHILS RELATIVE PERCENT: 2.1 %
GFR AFRICAN AMERICAN: 32
GFR NON-AFRICAN AMERICAN: 26
GLUCOSE BLD-MCNC: 144 MG/DL (ref 70–99)
GLUCOSE BLD-MCNC: 153 MG/DL (ref 70–99)
GLUCOSE BLD-MCNC: 159 MG/DL (ref 70–99)
GLUCOSE BLD-MCNC: 207 MG/DL (ref 70–99)
GLUCOSE BLD-MCNC: 214 MG/DL (ref 70–99)
HCT VFR BLD CALC: 27.2 % (ref 40.5–52.5)
HCT VFR BLD CALC: 28.6 % (ref 40.5–52.5)
HEMOGLOBIN: 9 G/DL (ref 13.5–17.5)
HEMOGLOBIN: 9.7 G/DL (ref 13.5–17.5)
LYMPHOCYTES ABSOLUTE: 1.3 K/UL (ref 1–5.1)
LYMPHOCYTES RELATIVE PERCENT: 14 %
MCH RBC QN AUTO: 30.3 PG (ref 26–34)
MCH RBC QN AUTO: 30.8 PG (ref 26–34)
MCHC RBC AUTO-ENTMCNC: 33.2 G/DL (ref 31–36)
MCHC RBC AUTO-ENTMCNC: 33.9 G/DL (ref 31–36)
MCV RBC AUTO: 90.9 FL (ref 80–100)
MCV RBC AUTO: 91.4 FL (ref 80–100)
MONOCYTES ABSOLUTE: 1 K/UL (ref 0–1.3)
MONOCYTES RELATIVE PERCENT: 11.1 %
NEUTROPHILS ABSOLUTE: 6.7 K/UL (ref 1.7–7.7)
NEUTROPHILS RELATIVE PERCENT: 72 %
PDW BLD-RTO: 15.4 % (ref 12.4–15.4)
PDW BLD-RTO: 15.4 % (ref 12.4–15.4)
PERFORMED ON: ABNORMAL
PHOSPHORUS: 2.7 MG/DL (ref 2.5–4.9)
PLATELET # BLD: 136 K/UL (ref 135–450)
PLATELET # BLD: 143 K/UL (ref 135–450)
PMV BLD AUTO: 8.6 FL (ref 5–10.5)
PMV BLD AUTO: 8.7 FL (ref 5–10.5)
POTASSIUM SERPL-SCNC: 4.9 MMOL/L (ref 3.5–5.1)
RBC # BLD: 2.98 M/UL (ref 4.2–5.9)
RBC # BLD: 3.15 M/UL (ref 4.2–5.9)
SODIUM BLD-SCNC: 136 MMOL/L (ref 136–145)
WBC # BLD: 10.8 K/UL (ref 4–11)
WBC # BLD: 9.3 K/UL (ref 4–11)

## 2018-09-20 PROCEDURE — P9047 ALBUMIN (HUMAN), 25%, 50ML: HCPCS | Performed by: INTERNAL MEDICINE

## 2018-09-20 PROCEDURE — 36415 COLL VENOUS BLD VENIPUNCTURE: CPT

## 2018-09-20 PROCEDURE — 6370000000 HC RX 637 (ALT 250 FOR IP): Performed by: INTERNAL MEDICINE

## 2018-09-20 PROCEDURE — 2060000000 HC ICU INTERMEDIATE R&B

## 2018-09-20 PROCEDURE — 94640 AIRWAY INHALATION TREATMENT: CPT

## 2018-09-20 PROCEDURE — 6360000002 HC RX W HCPCS: Performed by: INTERNAL MEDICINE

## 2018-09-20 PROCEDURE — 2700000000 HC OXYGEN THERAPY PER DAY

## 2018-09-20 PROCEDURE — 6370000000 HC RX 637 (ALT 250 FOR IP): Performed by: NURSE PRACTITIONER

## 2018-09-20 PROCEDURE — 6370000000 HC RX 637 (ALT 250 FOR IP): Performed by: HOSPITALIST

## 2018-09-20 PROCEDURE — 94660 CPAP INITIATION&MGMT: CPT

## 2018-09-20 PROCEDURE — 80069 RENAL FUNCTION PANEL: CPT

## 2018-09-20 PROCEDURE — 85027 COMPLETE CBC AUTOMATED: CPT

## 2018-09-20 PROCEDURE — 94761 N-INVAS EAR/PLS OXIMETRY MLT: CPT

## 2018-09-20 PROCEDURE — 99232 SBSQ HOSP IP/OBS MODERATE 35: CPT | Performed by: NURSE PRACTITIONER

## 2018-09-20 PROCEDURE — 85025 COMPLETE CBC W/AUTO DIFF WBC: CPT

## 2018-09-20 PROCEDURE — 94664 DEMO&/EVAL PT USE INHALER: CPT

## 2018-09-20 PROCEDURE — 2580000003 HC RX 258: Performed by: INTERNAL MEDICINE

## 2018-09-20 RX ORDER — HEPARIN SODIUM 1000 [USP'U]/ML
3000 INJECTION, SOLUTION INTRAVENOUS; SUBCUTANEOUS
Status: DISCONTINUED | OUTPATIENT
Start: 2018-09-20 | End: 2018-09-25 | Stop reason: HOSPADM

## 2018-09-20 RX ORDER — POLYETHYLENE GLYCOL 3350 17 G/17G
17 POWDER, FOR SOLUTION ORAL ONCE
Status: COMPLETED | OUTPATIENT
Start: 2018-09-20 | End: 2018-09-20

## 2018-09-20 RX ORDER — CARVEDILOL 6.25 MG/1
6.25 TABLET ORAL 2 TIMES DAILY WITH MEALS
Status: DISCONTINUED | OUTPATIENT
Start: 2018-09-20 | End: 2018-09-25 | Stop reason: HOSPADM

## 2018-09-20 RX ORDER — DILTIAZEM HYDROCHLORIDE 120 MG/1
120 CAPSULE, COATED, EXTENDED RELEASE ORAL DAILY
Status: DISCONTINUED | OUTPATIENT
Start: 2018-09-20 | End: 2018-09-25 | Stop reason: HOSPADM

## 2018-09-20 RX ORDER — LOSARTAN POTASSIUM 25 MG/1
25 TABLET ORAL NIGHTLY
Status: DISCONTINUED | OUTPATIENT
Start: 2018-09-21 | End: 2018-09-24

## 2018-09-20 RX ADMIN — PANTOPRAZOLE SODIUM 40 MG: 40 TABLET, DELAYED RELEASE ORAL at 06:04

## 2018-09-20 RX ADMIN — DILTIAZEM HYDROCHLORIDE 120 MG: 120 CAPSULE, COATED, EXTENDED RELEASE ORAL at 18:00

## 2018-09-20 RX ADMIN — INSULIN LISPRO 1 UNITS: 100 INJECTION, SOLUTION INTRAVENOUS; SUBCUTANEOUS at 22:05

## 2018-09-20 RX ADMIN — POLYETHYLENE GLYCOL (3350) 17 G: 17 POWDER, FOR SOLUTION ORAL at 16:35

## 2018-09-20 RX ADMIN — INSULIN LISPRO 1 UNITS: 100 INJECTION, SOLUTION INTRAVENOUS; SUBCUTANEOUS at 17:50

## 2018-09-20 RX ADMIN — ISOSORBIDE MONONITRATE 30 MG: 30 TABLET, EXTENDED RELEASE ORAL at 18:00

## 2018-09-20 RX ADMIN — Medication 2 PUFF: at 00:28

## 2018-09-20 RX ADMIN — HEPARIN SODIUM 5000 UNITS: 5000 INJECTION INTRAVENOUS; SUBCUTANEOUS at 17:50

## 2018-09-20 RX ADMIN — DOCUSATE SODIUM 100 MG: 100 CAPSULE, LIQUID FILLED ORAL at 09:47

## 2018-09-20 RX ADMIN — HEPARIN SODIUM 2000 UNITS: 1000 INJECTION INTRAVENOUS; SUBCUTANEOUS at 14:01

## 2018-09-20 RX ADMIN — Medication 10 ML: at 22:18

## 2018-09-20 RX ADMIN — OXYCODONE HYDROCHLORIDE AND ACETAMINOPHEN 1 TABLET: 7.5; 325 TABLET ORAL at 09:47

## 2018-09-20 RX ADMIN — CITALOPRAM HYDROBROMIDE 20 MG: 20 TABLET ORAL at 09:47

## 2018-09-20 RX ADMIN — OXYCODONE HYDROCHLORIDE AND ACETAMINOPHEN 1 TABLET: 7.5; 325 TABLET ORAL at 22:05

## 2018-09-20 RX ADMIN — HEPARIN SODIUM 5000 UNITS: 5000 INJECTION INTRAVENOUS; SUBCUTANEOUS at 21:59

## 2018-09-20 RX ADMIN — INSULIN LISPRO 1 UNITS: 100 INJECTION, SOLUTION INTRAVENOUS; SUBCUTANEOUS at 09:51

## 2018-09-20 RX ADMIN — Medication 2 PUFF: at 21:46

## 2018-09-20 RX ADMIN — HEPARIN SODIUM 5000 UNITS: 5000 INJECTION INTRAVENOUS; SUBCUTANEOUS at 06:04

## 2018-09-20 RX ADMIN — OXYCODONE HYDROCHLORIDE AND ACETAMINOPHEN 1 TABLET: 7.5; 325 TABLET ORAL at 18:00

## 2018-09-20 RX ADMIN — ALBUMIN (HUMAN) 25 G: 0.25 INJECTION, SOLUTION INTRAVENOUS at 14:01

## 2018-09-20 RX ADMIN — OXYCODONE HYDROCHLORIDE AND ACETAMINOPHEN 1 TABLET: 7.5; 325 TABLET ORAL at 06:04

## 2018-09-20 RX ADMIN — INSULIN GLARGINE 10 UNITS: 100 INJECTION, SOLUTION SUBCUTANEOUS at 22:05

## 2018-09-20 RX ADMIN — TRAZODONE HYDROCHLORIDE 150 MG: 50 TABLET ORAL at 21:58

## 2018-09-20 RX ADMIN — Medication 10 ML: at 09:40

## 2018-09-20 RX ADMIN — TORSEMIDE 20 MG: 20 TABLET ORAL at 18:00

## 2018-09-20 RX ADMIN — ALBUMIN (HUMAN) 25 G: 0.25 INJECTION, SOLUTION INTRAVENOUS at 16:03

## 2018-09-20 RX ADMIN — DOCUSATE SODIUM 100 MG: 100 CAPSULE, LIQUID FILLED ORAL at 21:58

## 2018-09-20 RX ADMIN — POLYETHYLENE GLYCOL 3350 17 G: 17 POWDER, FOR SOLUTION ORAL at 09:47

## 2018-09-20 RX ADMIN — SENNOSIDES 8.6 MG: 8.6 TABLET, FILM COATED ORAL at 21:58

## 2018-09-20 RX ADMIN — HEPARIN SODIUM 2600 UNITS: 1000 INJECTION INTRAVENOUS; SUBCUTANEOUS at 17:00

## 2018-09-20 RX ADMIN — PRAVASTATIN SODIUM 40 MG: 40 TABLET ORAL at 21:58

## 2018-09-20 RX ADMIN — CALCIUM CARBONATE 500 MG: 500 TABLET, CHEWABLE ORAL at 16:35

## 2018-09-20 RX ADMIN — CARVEDILOL 6.25 MG: 6.25 TABLET, FILM COATED ORAL at 18:00

## 2018-09-20 RX ADMIN — ONDANSETRON HYDROCHLORIDE 4 MG: 2 INJECTION, SOLUTION INTRAMUSCULAR; INTRAVENOUS at 09:40

## 2018-09-20 ASSESSMENT — PAIN SCALES - GENERAL
PAINLEVEL_OUTOF10: 10
PAINLEVEL_OUTOF10: 9
PAINLEVEL_OUTOF10: 10
PAINLEVEL_OUTOF10: 8
PAINLEVEL_OUTOF10: 9
PAINLEVEL_OUTOF10: 10

## 2018-09-20 ASSESSMENT — PAIN DESCRIPTION - LOCATION
LOCATION: BACK;NECK
LOCATION: BACK;NECK

## 2018-09-20 ASSESSMENT — PAIN DESCRIPTION - PAIN TYPE
TYPE: CHRONIC PAIN
TYPE: CHRONIC PAIN

## 2018-09-20 ASSESSMENT — PAIN DESCRIPTION - DESCRIPTORS: DESCRIPTORS: ACHING

## 2018-09-20 NOTE — PROGRESS NOTES
Hospitalist Progress Note  9/20/2018 2:52 PM  Subjective:   Admit Date: 9/14/2018  PCP: Fausto Garrison MD Status: Inpatient  Interval History: Hospital Day: 7, admitted with dyspnea and pulmonary edema. Less dyspnea. Plan for temporary hemodialysis catheter, holding  Plavix for tunneled cath and repositioning of peritoneal dialysis and Tunnel Vas cath placement. History of present illness:   46 y.o. male with ESRD who had been on hemodialysis since earlier in the year (does not remember exactly) who transitioned to peritoneal dialysis a month ago with increased leaking of dialysis catheter. Renal failure apparently a result of contrast nephropathy and diabetes. Right chest tunneled catheter removed a week ago. He also has a left arm fistula that is not functioning. He experienced progressive dyspnea with swelling over the past week. History of COPD diagnosed five years ago and followed by Dr. Odell Wade. He quit smoking a week ago after 60 pk/yr history of smoking. He is on multiple inhalers and nebulizer at home but does not remember exactly which ones. Appears to be Duonebs and albuterol MDI. History of type 2 diabetes and he has not been on insulin or any other medication since starting hemodilalysis    DIET CARB CONTROL; Low Sodium (2 GM);  Daily Fluid Restriction: 1500 ml - NPO after midnight  Medications:     carvedilol  25 mg Oral BID WC   losartan  50 mg Oral Daily   ipratropium-albuterol  1 vial Inhalation Q4H   dianeal lo-trever 2.5%   Intraperitoneal Q4H   aspirin  81 mg Oral Daily   atorvastatin  40 mg Oral Nightly   citalopram  20 mg Oral Daily   clopidogrel  75 mg Oral Daily   diltiazem  180 mg Oral Daily   isosorbide mononitrate  30 mg Oral Daily   traZODone  150 mg Oral Nightly   insulin lispro SSI   0-6 Units Subcutaneous TID WC / HS   furosemide  60 mg Intravenous BID (9/14, day #3)   heparin (porcine)  5,000 Units Subcutaneous 3 times per day     Recent Labs      09/20/18   0505   WBC abnormality. Neck: no adenopathy, no carotid bruit, no JVD, supple, symmetrical, trachea midline and thyroid not enlarged, symmetric, no tenderness/mass/nodules  Lungs: clear to auscultation bilaterally  Heart: regular rate and rhythm, S1, S2 normal, no murmur, click, rub or gallop  Abdomen: protuberent, left lower quadrant peritoneal dialysis catherter noted, audible bowel sounds  Extremities: 2+ pitting edema to mid shin, neurovascular status intact  Neurologic: Mental status: Alert, oriented, thought content appropriate    Assessment and Plan:   1. Combined systolic and diastolic heart failure, NYHA class 4:  Volume retention due to ESRD and inadequate peritoneal dialysis admitted to inpatient status. Intravenous furosemide 60 mg IV BID and nephrology evaluation initiated. 2. ESRD: Resume 2.5% Dianeal CAPD every 4 hours. Will need to transfer to  for CAPD. Surgery evaluation of leaking Tenckhoff catheter. He has stopped smoking in order to get on the kidney transplant list. Plan for Tunnel cath and changing PD catheter, need to hold Plavix, if OK with Cardiology. Holding Plavix for plan for Tunnel catheter and repositioning of the peritoneal dialysis catheter and Tunnel Vas Cath for HD. Patient had a temporary central catheter placed for emergent hemodialysis need. 3. Hypokalemia with normal magnesium:  Oral potassium replacement, monitor magnesium and potassium. 4. Chronic anemia of renal failure:  Above target for DAVON. 5. Acute Diastolic Heart Failure (HFpEF) and now heart failure with reduced ejection fraction (40-45%) with moderate aortic stenosis. 6. History of CAD with elevated troponin (patent LAD stent and mild residual nonobstructive disease by January 2017 Cath) continues on aspirin 81 mg, Plavix 75 mg, carvedilol 12.5 mg BID, and atorvastatin 40 mg nightly. Appreciate cardiology evaluation. 7. Aortic stenosis with bicuspid aortic valve.     TTE artic valve area is calculated at 0.95 cm2

## 2018-09-20 NOTE — PROGRESS NOTES
lesions or rashes, warm to touch. DATA:    CBC:   Lab Results   Component Value Date    WBC 11.5 09/17/2018    RBC 3.53 09/17/2018    HGB 10.6 09/17/2018    HCT 31.6 09/17/2018    MCV 89.7 09/17/2018    MCH 30.1 09/17/2018    MCHC 33.6 09/17/2018    RDW 15.7 09/17/2018     09/17/2018    MPV 7.8 09/17/2018     BMP:    Lab Results   Component Value Date     09/20/2018    K 4.9 09/20/2018    K 3.2 09/17/2018    CL 99 09/20/2018    CO2 28 09/20/2018    BUN 20 09/20/2018    LABALBU 3.4 09/20/2018    CREATININE 2.6 09/20/2018    CALCIUM 8.3 09/20/2018    GFRAA 32 09/20/2018    GFRAA >60 05/17/2013    LABGLOM 26 09/20/2018    GLUCOSE 144 09/20/2018       IMPRESSION/RECOMMENDATIONS:      1- ESRD: We will continue CAPD using 2.5% Dianeal every 4 hours--> stopped PD 9/18   -s/p temp line on 9/18 as pt's last dose of plavix was 9/17, will need TDC- d/w IR nurse   -OP HDU - CCM consulted   -HD initiated from 9/18/18   -HD on 9/20 and then back on 9/21  2- Leaking Tenckhoff catheter: seen by Dr. Birdie Perez from surgery and plans for new catheter once off plavix on 9/21/18 noted  3- Hypokalemia: S/p PRN potassium replacement. 4- HTN: bP low, meds adjusted and hold before HD-decreased coreg, cozaar,   5- Anemia: DAVON and monitor.

## 2018-09-20 NOTE — PROGRESS NOTES
Once per day on Tue Thu Sat    [START ON 9/21/2018] ceFAZolin  2 g Intravenous On Call to OR    senna  1 tablet Oral Nightly    torsemide  20 mg Oral Daily    insulin glargine  10 Units Subcutaneous Nightly    pravastatin  40 mg Oral Nightly    polyethylene glycol  17 g Oral Daily    docusate sodium  100 mg Oral BID    pantoprazole  40 mg Oral QAM AC    citalopram  20 mg Oral Daily    isosorbide mononitrate  30 mg Oral Daily    traZODone  150 mg Oral Nightly    insulin lispro  0-6 Units Subcutaneous TID WC    insulin lispro  0-3 Units Subcutaneous Nightly    sodium chloride flush  10 mL Intravenous 2 times per day    heparin (porcine)  5,000 Units Subcutaneous 3 times per day      dextrose         Lab Data:  CBC:   Recent Labs      09/20/18   0505   WBC  9.3   HGB  9.0*   PLT  143     BMP:    Recent Labs      09/18/18   0454  09/19/18   0515  09/20/18   0505   NA  135*  133*  136   K  3.8  4.5  4.9   CO2  28  26  28   BUN  39*  23*  20   CREATININE  3.5*  2.9*  2.6*     INR:    Recent Labs      09/18/18   1244   INR  1.03     BNP:    No results for input(s): PROBNP in the last 72 hours. Cardiac Enzymes:   No results for input(s): TROPONINI in the last 72 hours. Lipids:   Lab Results   Component Value Date    TRIG 110 09/18/2018    TRIG 107 05/22/2017    HDL 41 09/18/2018    HDL 36 05/22/2017    LDLCALC 55 09/18/2018    LDLCALC 72 05/22/2017    LDLDIRECT 199 09/04/2014    LDLDIRECT 172 03/07/2014       Cardiac Imaging:   Dobutamine stress echo 9/17/18:    Baseline nonspecific ST changes. PVC's during infusion. Patient developed bilateral jaw tightness 4/10, likely related to Dobutamine. Symptoms resolved with rest.   No EKG changes of stress induced left ventricular ischemia. Dobutamine stress echo shows normal baseline EF at 55%. No regional wall motion abnormality at rest or post dobutamine infusion. Normal hyperdynamic response to dobutamine stress. Definity contrast is used.    Normal

## 2018-09-20 NOTE — PROGRESS NOTES
Doc Flowsheet for more detail.      Electronically signed by Miko Farmer, [address],  on 9/20/18 at 4:08 PM    Contact Number: [phone]

## 2018-09-20 NOTE — FLOWSHEET NOTE
Treatment time: 3 hours  Net UF: 3000 ml    Pre weight: 97.3 kg   Post weight: 94.2 kg  EDW: 92.2 kg -PD    Access used: right IJ  Access function: good with  ml/min    Medications or blood products given: Heparin, Albumin given x 2 times    Regular outpatient schedule: T.T.S    Summary of response to treatment: hypotension and Albumin given for BP support.     Copy of dialysis treatment record placed in chart, to be scanned into EMR.     09/20/18 1336 09/20/18 1839   Vital Signs   /62 --    Temp 99.4 °F (37.4 °C) --    Pulse 77 --    Weight 214 lb 8.1 oz (97.3 kg) --    Weight Method Actual;Standing scale --    Percent Weight Change 1.66 --    Post-Hemodialysis Assessment   Post-Treatment Procedures --  Blood returned;Catheter capped, clamped and heparinized x 2 ports   Machine Disinfection Process --  Acid/Vinegar Clean;Heat Disinfect   Rinseback Volume (ml) --  400 ml   Total Liters Processed (l/min) --  60.7 l/min   Dialyzer Clearance --  Moderately streaked   Duration of Treatment (minutes) --  180 minutes   Heparin amount administered during treatment (units) --  2000 units   Hemodialysis Intake (ml) --  600 ml   Hemodialysis Output (ml) --  3600 ml   NET Removed (ml) --  3000 ml   Tolerated Treatment --  Fair   Bilateral Breath Sounds --  Clear;Diminished   RLE Edema Trace Trace   LLE Edema +1;+2;Pitting +1;+2;Pitting

## 2018-09-21 ENCOUNTER — ANESTHESIA EVENT (OUTPATIENT)
Dept: OPERATING ROOM | Age: 48
DRG: 981 | End: 2018-09-21
Payer: MEDICARE

## 2018-09-21 ENCOUNTER — ANESTHESIA (OUTPATIENT)
Dept: OPERATING ROOM | Age: 48
DRG: 981 | End: 2018-09-21
Payer: MEDICARE

## 2018-09-21 VITALS
DIASTOLIC BLOOD PRESSURE: 59 MMHG | TEMPERATURE: 98.6 F | SYSTOLIC BLOOD PRESSURE: 103 MMHG | RESPIRATION RATE: 10 BRPM | OXYGEN SATURATION: 100 %

## 2018-09-21 LAB
ALBUMIN SERPL-MCNC: 3.9 G/DL (ref 3.4–5)
ANION GAP SERPL CALCULATED.3IONS-SCNC: 11 MMOL/L (ref 3–16)
BUN BLDV-MCNC: 21 MG/DL (ref 7–20)
CALCIUM SERPL-MCNC: 8.9 MG/DL (ref 8.3–10.6)
CHLORIDE BLD-SCNC: 99 MMOL/L (ref 99–110)
CO2: 25 MMOL/L (ref 21–32)
CREAT SERPL-MCNC: 2.9 MG/DL (ref 0.9–1.3)
GFR AFRICAN AMERICAN: 28
GFR NON-AFRICAN AMERICAN: 23
GLUCOSE BLD-MCNC: 121 MG/DL (ref 70–99)
GLUCOSE BLD-MCNC: 125 MG/DL (ref 70–99)
GLUCOSE BLD-MCNC: 130 MG/DL (ref 70–99)
GLUCOSE BLD-MCNC: 131 MG/DL (ref 70–99)
GLUCOSE BLD-MCNC: 165 MG/DL (ref 70–99)
GLUCOSE BLD-MCNC: 174 MG/DL (ref 70–99)
PERFORMED ON: ABNORMAL
PHOSPHORUS: 2.9 MG/DL (ref 2.5–4.9)
POTASSIUM SERPL-SCNC: 4.7 MMOL/L (ref 3.5–5.1)
SODIUM BLD-SCNC: 135 MMOL/L (ref 136–145)

## 2018-09-21 PROCEDURE — 6360000002 HC RX W HCPCS: Performed by: INTERNAL MEDICINE

## 2018-09-21 PROCEDURE — 80069 RENAL FUNCTION PANEL: CPT

## 2018-09-21 PROCEDURE — 6370000000 HC RX 637 (ALT 250 FOR IP): Performed by: INTERNAL MEDICINE

## 2018-09-21 PROCEDURE — 1200000000 HC SEMI PRIVATE

## 2018-09-21 PROCEDURE — 7100000001 HC PACU RECOVERY - ADDTL 15 MIN: Performed by: SURGERY

## 2018-09-21 PROCEDURE — 2500000003 HC RX 250 WO HCPCS: Performed by: SURGERY

## 2018-09-21 PROCEDURE — 94660 CPAP INITIATION&MGMT: CPT

## 2018-09-21 PROCEDURE — 2500000003 HC RX 250 WO HCPCS: Performed by: NURSE ANESTHETIST, CERTIFIED REGISTERED

## 2018-09-21 PROCEDURE — 6360000002 HC RX W HCPCS: Performed by: ANESTHESIOLOGY

## 2018-09-21 PROCEDURE — 99232 SBSQ HOSP IP/OBS MODERATE 35: CPT | Performed by: NURSE PRACTITIONER

## 2018-09-21 PROCEDURE — 3700000000 HC ANESTHESIA ATTENDED CARE: Performed by: SURGERY

## 2018-09-21 PROCEDURE — 2709999900 HC NON-CHARGEABLE SUPPLY: Performed by: SURGERY

## 2018-09-21 PROCEDURE — 6370000000 HC RX 637 (ALT 250 FOR IP): Performed by: HOSPITALIST

## 2018-09-21 PROCEDURE — 6370000000 HC RX 637 (ALT 250 FOR IP): Performed by: NURSE PRACTITIONER

## 2018-09-21 PROCEDURE — 0WPG43Z REMOVAL OF INFUSION DEVICE FROM PERITONEAL CAVITY, PERCUTANEOUS ENDOSCOPIC APPROACH: ICD-10-PCS | Performed by: SURGERY

## 2018-09-21 PROCEDURE — 36415 COLL VENOUS BLD VENIPUNCTURE: CPT

## 2018-09-21 PROCEDURE — 3600000004 HC SURGERY LEVEL 4 BASE: Performed by: SURGERY

## 2018-09-21 PROCEDURE — 94640 AIRWAY INHALATION TREATMENT: CPT

## 2018-09-21 PROCEDURE — 6360000002 HC RX W HCPCS: Performed by: SURGERY

## 2018-09-21 PROCEDURE — 7100000000 HC PACU RECOVERY - FIRST 15 MIN: Performed by: SURGERY

## 2018-09-21 PROCEDURE — 2720000010 HC SURG SUPPLY STERILE: Performed by: SURGERY

## 2018-09-21 PROCEDURE — 3700000001 HC ADD 15 MINUTES (ANESTHESIA): Performed by: SURGERY

## 2018-09-21 PROCEDURE — 49324 LAP INSERT TUNNEL IP CATH: CPT | Performed by: SURGERY

## 2018-09-21 PROCEDURE — 2580000003 HC RX 258: Performed by: INTERNAL MEDICINE

## 2018-09-21 PROCEDURE — 3600000014 HC SURGERY LEVEL 4 ADDTL 15MIN: Performed by: SURGERY

## 2018-09-21 PROCEDURE — 0WHG43Z INSERTION OF INFUSION DEVICE INTO PERITONEAL CAVITY, PERCUTANEOUS ENDOSCOPIC APPROACH: ICD-10-PCS | Performed by: SURGERY

## 2018-09-21 PROCEDURE — 2580000003 HC RX 258: Performed by: NURSE ANESTHETIST, CERTIFIED REGISTERED

## 2018-09-21 PROCEDURE — 6360000002 HC RX W HCPCS: Performed by: NURSE ANESTHETIST, CERTIFIED REGISTERED

## 2018-09-21 PROCEDURE — C1750 CATH, HEMODIALYSIS,LONG-TERM: HCPCS | Performed by: SURGERY

## 2018-09-21 PROCEDURE — 49422 REMOVE TUNNELED IP CATH: CPT | Performed by: SURGERY

## 2018-09-21 RX ORDER — OXYCODONE HYDROCHLORIDE AND ACETAMINOPHEN 5; 325 MG/1; MG/1
2 TABLET ORAL PRN
Status: DISCONTINUED | OUTPATIENT
Start: 2018-09-21 | End: 2018-09-21 | Stop reason: HOSPADM

## 2018-09-21 RX ORDER — BISACODYL 10 MG
10 SUPPOSITORY, RECTAL RECTAL DAILY PRN
Status: DISCONTINUED | OUTPATIENT
Start: 2018-09-21 | End: 2018-09-25 | Stop reason: HOSPADM

## 2018-09-21 RX ORDER — ROCURONIUM BROMIDE 10 MG/ML
INJECTION, SOLUTION INTRAVENOUS PRN
Status: DISCONTINUED | OUTPATIENT
Start: 2018-09-21 | End: 2018-09-21 | Stop reason: SDUPTHER

## 2018-09-21 RX ORDER — HYDRALAZINE HYDROCHLORIDE 20 MG/ML
5 INJECTION INTRAMUSCULAR; INTRAVENOUS EVERY 10 MIN PRN
Status: DISCONTINUED | OUTPATIENT
Start: 2018-09-21 | End: 2018-09-21 | Stop reason: HOSPADM

## 2018-09-21 RX ORDER — LABETALOL HYDROCHLORIDE 5 MG/ML
5 INJECTION, SOLUTION INTRAVENOUS EVERY 10 MIN PRN
Status: DISCONTINUED | OUTPATIENT
Start: 2018-09-21 | End: 2018-09-21 | Stop reason: HOSPADM

## 2018-09-21 RX ORDER — MORPHINE SULFATE 4 MG/ML
1 INJECTION, SOLUTION INTRAMUSCULAR; INTRAVENOUS EVERY 5 MIN PRN
Status: DISCONTINUED | OUTPATIENT
Start: 2018-09-21 | End: 2018-09-21 | Stop reason: HOSPADM

## 2018-09-21 RX ORDER — PROMETHAZINE HYDROCHLORIDE 25 MG/ML
6.25 INJECTION, SOLUTION INTRAMUSCULAR; INTRAVENOUS
Status: DISCONTINUED | OUTPATIENT
Start: 2018-09-21 | End: 2018-09-21 | Stop reason: HOSPADM

## 2018-09-21 RX ORDER — SODIUM CHLORIDE, SODIUM LACTATE, POTASSIUM CHLORIDE, CALCIUM CHLORIDE 600; 310; 30; 20 MG/100ML; MG/100ML; MG/100ML; MG/100ML
INJECTION, SOLUTION INTRAVENOUS CONTINUOUS PRN
Status: DISCONTINUED | OUTPATIENT
Start: 2018-09-21 | End: 2018-09-21 | Stop reason: SDUPTHER

## 2018-09-21 RX ORDER — MEPERIDINE HYDROCHLORIDE 50 MG/ML
12.5 INJECTION INTRAMUSCULAR; INTRAVENOUS; SUBCUTANEOUS EVERY 5 MIN PRN
Status: DISCONTINUED | OUTPATIENT
Start: 2018-09-21 | End: 2018-09-21 | Stop reason: HOSPADM

## 2018-09-21 RX ORDER — ONDANSETRON 2 MG/ML
4 INJECTION INTRAMUSCULAR; INTRAVENOUS PRN
Status: DISCONTINUED | OUTPATIENT
Start: 2018-09-21 | End: 2018-09-21 | Stop reason: HOSPADM

## 2018-09-21 RX ORDER — ONDANSETRON 2 MG/ML
INJECTION INTRAMUSCULAR; INTRAVENOUS PRN
Status: DISCONTINUED | OUTPATIENT
Start: 2018-09-21 | End: 2018-09-21 | Stop reason: SDUPTHER

## 2018-09-21 RX ORDER — MORPHINE SULFATE 4 MG/ML
2 INJECTION, SOLUTION INTRAMUSCULAR; INTRAVENOUS EVERY 5 MIN PRN
Status: DISCONTINUED | OUTPATIENT
Start: 2018-09-21 | End: 2018-09-21 | Stop reason: HOSPADM

## 2018-09-21 RX ORDER — FENTANYL CITRATE 50 UG/ML
INJECTION, SOLUTION INTRAMUSCULAR; INTRAVENOUS PRN
Status: DISCONTINUED | OUTPATIENT
Start: 2018-09-21 | End: 2018-09-21 | Stop reason: SDUPTHER

## 2018-09-21 RX ORDER — ALBUTEROL SULFATE 2.5 MG/3ML
2.5 SOLUTION RESPIRATORY (INHALATION) EVERY 6 HOURS PRN
Status: DISCONTINUED | OUTPATIENT
Start: 2018-09-21 | End: 2018-09-25 | Stop reason: HOSPADM

## 2018-09-21 RX ORDER — PROPOFOL 10 MG/ML
INJECTION, EMULSION INTRAVENOUS CONTINUOUS PRN
Status: DISCONTINUED | OUTPATIENT
Start: 2018-09-21 | End: 2018-09-21 | Stop reason: SDUPTHER

## 2018-09-21 RX ORDER — MIDAZOLAM HYDROCHLORIDE 1 MG/ML
INJECTION INTRAMUSCULAR; INTRAVENOUS PRN
Status: DISCONTINUED | OUTPATIENT
Start: 2018-09-21 | End: 2018-09-21 | Stop reason: SDUPTHER

## 2018-09-21 RX ORDER — PROPOFOL 10 MG/ML
INJECTION, EMULSION INTRAVENOUS PRN
Status: DISCONTINUED | OUTPATIENT
Start: 2018-09-21 | End: 2018-09-21 | Stop reason: SDUPTHER

## 2018-09-21 RX ORDER — OXYCODONE HYDROCHLORIDE AND ACETAMINOPHEN 5; 325 MG/1; MG/1
1 TABLET ORAL PRN
Status: DISCONTINUED | OUTPATIENT
Start: 2018-09-21 | End: 2018-09-21 | Stop reason: HOSPADM

## 2018-09-21 RX ORDER — DIPHENHYDRAMINE HYDROCHLORIDE 50 MG/ML
12.5 INJECTION INTRAMUSCULAR; INTRAVENOUS
Status: DISCONTINUED | OUTPATIENT
Start: 2018-09-21 | End: 2018-09-21 | Stop reason: HOSPADM

## 2018-09-21 RX ORDER — SENNA PLUS 8.6 MG/1
1 TABLET ORAL 2 TIMES DAILY
Status: DISCONTINUED | OUTPATIENT
Start: 2018-09-21 | End: 2018-09-25 | Stop reason: HOSPADM

## 2018-09-21 RX ORDER — BUPIVACAINE HYDROCHLORIDE AND EPINEPHRINE 5; 5 MG/ML; UG/ML
INJECTION, SOLUTION PERINEURAL PRN
Status: DISCONTINUED | OUTPATIENT
Start: 2018-09-21 | End: 2018-09-21 | Stop reason: HOSPADM

## 2018-09-21 RX ORDER — LIDOCAINE HYDROCHLORIDE 20 MG/ML
INJECTION, SOLUTION INFILTRATION; PERINEURAL PRN
Status: DISCONTINUED | OUTPATIENT
Start: 2018-09-21 | End: 2018-09-21 | Stop reason: SDUPTHER

## 2018-09-21 RX ADMIN — DOCUSATE SODIUM 100 MG: 100 CAPSULE, LIQUID FILLED ORAL at 21:57

## 2018-09-21 RX ADMIN — INSULIN GLARGINE 10 UNITS: 100 INJECTION, SOLUTION SUBCUTANEOUS at 21:57

## 2018-09-21 RX ADMIN — FENTANYL CITRATE 50 MCG: 50 INJECTION INTRAMUSCULAR; INTRAVENOUS at 11:47

## 2018-09-21 RX ADMIN — CITALOPRAM HYDROBROMIDE 20 MG: 20 TABLET ORAL at 15:12

## 2018-09-21 RX ADMIN — Medication 10 ML: at 22:09

## 2018-09-21 RX ADMIN — PRAVASTATIN SODIUM 40 MG: 40 TABLET ORAL at 21:57

## 2018-09-21 RX ADMIN — DOCUSATE SODIUM 100 MG: 100 CAPSULE, LIQUID FILLED ORAL at 15:12

## 2018-09-21 RX ADMIN — LOSARTAN POTASSIUM 25 MG: 25 TABLET, FILM COATED ORAL at 21:57

## 2018-09-21 RX ADMIN — CALCIUM CARBONATE 500 MG: 500 TABLET, CHEWABLE ORAL at 18:17

## 2018-09-21 RX ADMIN — PROPOFOL 50 MG: 10 INJECTION, EMULSION INTRAVENOUS at 11:45

## 2018-09-21 RX ADMIN — ISOSORBIDE MONONITRATE 30 MG: 30 TABLET, EXTENDED RELEASE ORAL at 15:12

## 2018-09-21 RX ADMIN — CALCIUM CARBONATE 500 MG: 500 TABLET, CHEWABLE ORAL at 09:52

## 2018-09-21 RX ADMIN — LIDOCAINE HYDROCHLORIDE 40 MG: 20 INJECTION, SOLUTION INFILTRATION; PERINEURAL at 11:01

## 2018-09-21 RX ADMIN — SUGAMMADEX 200 MG: 100 INJECTION, SOLUTION INTRAVENOUS at 11:51

## 2018-09-21 RX ADMIN — HYDROMORPHONE HYDROCHLORIDE 0.5 MG: 1 INJECTION, SOLUTION INTRAMUSCULAR; INTRAVENOUS; SUBCUTANEOUS at 12:48

## 2018-09-21 RX ADMIN — SENNOSIDES 8.6 MG: 8.6 TABLET, FILM COATED ORAL at 21:57

## 2018-09-21 RX ADMIN — HEPARIN SODIUM 5000 UNITS: 5000 INJECTION INTRAVENOUS; SUBCUTANEOUS at 21:56

## 2018-09-21 RX ADMIN — ALBUTEROL SULFATE 2.5 MG: 2.5 SOLUTION RESPIRATORY (INHALATION) at 20:14

## 2018-09-21 RX ADMIN — ROCURONIUM BROMIDE 50 MG: 10 SOLUTION INTRAVENOUS at 11:01

## 2018-09-21 RX ADMIN — CARVEDILOL 6.25 MG: 6.25 TABLET, FILM COATED ORAL at 18:12

## 2018-09-21 RX ADMIN — HEPARIN SODIUM 5000 UNITS: 5000 INJECTION INTRAVENOUS; SUBCUTANEOUS at 15:55

## 2018-09-21 RX ADMIN — PROPOFOL 120 MG: 10 INJECTION, EMULSION INTRAVENOUS at 11:01

## 2018-09-21 RX ADMIN — HYDROMORPHONE HYDROCHLORIDE 0.5 MG: 1 INJECTION, SOLUTION INTRAMUSCULAR; INTRAVENOUS; SUBCUTANEOUS at 12:59

## 2018-09-21 RX ADMIN — PROPOFOL 150 MCG/KG/MIN: 10 INJECTION, EMULSION INTRAVENOUS at 11:06

## 2018-09-21 RX ADMIN — ROCURONIUM BROMIDE 20 MG: 10 SOLUTION INTRAVENOUS at 11:45

## 2018-09-21 RX ADMIN — SODIUM CHLORIDE, POTASSIUM CHLORIDE, SODIUM LACTATE AND CALCIUM CHLORIDE: 600; 310; 30; 20 INJECTION, SOLUTION INTRAVENOUS at 10:48

## 2018-09-21 RX ADMIN — OXYCODONE HYDROCHLORIDE AND ACETAMINOPHEN 1 TABLET: 7.5; 325 TABLET ORAL at 21:57

## 2018-09-21 RX ADMIN — POLYETHYLENE GLYCOL 3350 17 G: 17 POWDER, FOR SOLUTION ORAL at 15:12

## 2018-09-21 RX ADMIN — BISACODYL 10 MG: 10 SUPPOSITORY RECTAL at 22:10

## 2018-09-21 RX ADMIN — ROCURONIUM BROMIDE 10 MG: 10 SOLUTION INTRAVENOUS at 11:18

## 2018-09-21 RX ADMIN — CALCIUM CARBONATE 500 MG: 500 TABLET, CHEWABLE ORAL at 22:10

## 2018-09-21 RX ADMIN — PANTOPRAZOLE SODIUM 40 MG: 40 TABLET, DELAYED RELEASE ORAL at 05:20

## 2018-09-21 RX ADMIN — TRAZODONE HYDROCHLORIDE 150 MG: 50 TABLET ORAL at 21:57

## 2018-09-21 RX ADMIN — FENTANYL CITRATE 100 MCG: 50 INJECTION INTRAMUSCULAR; INTRAVENOUS at 11:01

## 2018-09-21 RX ADMIN — Medication 10 ML: at 15:13

## 2018-09-21 RX ADMIN — ONDANSETRON 4 MG: 2 INJECTION INTRAMUSCULAR; INTRAVENOUS at 11:35

## 2018-09-21 RX ADMIN — PHENYLEPHRINE HYDROCHLORIDE 100 MCG: 10 INJECTION INTRAVENOUS at 11:09

## 2018-09-21 RX ADMIN — PHENYLEPHRINE HYDROCHLORIDE 100 MCG: 10 INJECTION INTRAVENOUS at 11:06

## 2018-09-21 RX ADMIN — Medication 2 G: at 11:07

## 2018-09-21 RX ADMIN — TORSEMIDE 20 MG: 20 TABLET ORAL at 15:12

## 2018-09-21 RX ADMIN — PHENYLEPHRINE HYDROCHLORIDE 100 MCG: 10 INJECTION INTRAVENOUS at 11:01

## 2018-09-21 RX ADMIN — PROPOFOL 50 MG: 10 INJECTION, EMULSION INTRAVENOUS at 12:02

## 2018-09-21 RX ADMIN — MIDAZOLAM HYDROCHLORIDE 2 MG: 2 INJECTION, SOLUTION INTRAMUSCULAR; INTRAVENOUS at 10:51

## 2018-09-21 RX ADMIN — OXYCODONE HYDROCHLORIDE AND ACETAMINOPHEN 1 TABLET: 7.5; 325 TABLET ORAL at 15:14

## 2018-09-21 RX ADMIN — INSULIN LISPRO 1 UNITS: 100 INJECTION, SOLUTION INTRAVENOUS; SUBCUTANEOUS at 18:13

## 2018-09-21 ASSESSMENT — PULMONARY FUNCTION TESTS
PIF_VALUE: 19
PIF_VALUE: 35
PIF_VALUE: 1
PIF_VALUE: 40
PIF_VALUE: 26
PIF_VALUE: 25
PIF_VALUE: 19
PIF_VALUE: 39
PIF_VALUE: 38
PIF_VALUE: 39
PIF_VALUE: 25
PIF_VALUE: 1
PIF_VALUE: 2
PIF_VALUE: 19
PIF_VALUE: 19
PIF_VALUE: 32
PIF_VALUE: 25
PIF_VALUE: 1
PIF_VALUE: 27
PIF_VALUE: 19
PIF_VALUE: 30
PIF_VALUE: 16
PIF_VALUE: 1
PIF_VALUE: 19
PIF_VALUE: 1
PIF_VALUE: 20
PIF_VALUE: 36
PIF_VALUE: 19
PIF_VALUE: 4
PIF_VALUE: 20
PIF_VALUE: 19
PIF_VALUE: 39
PIF_VALUE: 8
PIF_VALUE: 41
PIF_VALUE: 29
PIF_VALUE: 33
PIF_VALUE: 39
PIF_VALUE: 19
PIF_VALUE: 1
PIF_VALUE: 20
PIF_VALUE: 5
PIF_VALUE: 21
PIF_VALUE: 19
PIF_VALUE: 19
PIF_VALUE: 30
PIF_VALUE: 24
PIF_VALUE: 19
PIF_VALUE: 25
PIF_VALUE: 39
PIF_VALUE: 20
PIF_VALUE: 19
PIF_VALUE: 19
PIF_VALUE: 39
PIF_VALUE: 6
PIF_VALUE: 20
PIF_VALUE: 35
PIF_VALUE: 1
PIF_VALUE: 26
PIF_VALUE: 19
PIF_VALUE: 24
PIF_VALUE: 27
PIF_VALUE: 20
PIF_VALUE: 19
PIF_VALUE: 39
PIF_VALUE: 19
PIF_VALUE: 40
PIF_VALUE: 19
PIF_VALUE: 39
PIF_VALUE: 19
PIF_VALUE: 6
PIF_VALUE: 19
PIF_VALUE: 20
PIF_VALUE: 7
PIF_VALUE: 19
PIF_VALUE: 21
PIF_VALUE: 24
PIF_VALUE: 25

## 2018-09-21 ASSESSMENT — PAIN SCALES - GENERAL
PAINLEVEL_OUTOF10: 10
PAINLEVEL_OUTOF10: 8
PAINLEVEL_OUTOF10: 8
PAINLEVEL_OUTOF10: 10
PAINLEVEL_OUTOF10: 9
PAINLEVEL_OUTOF10: 5

## 2018-09-21 ASSESSMENT — COPD QUESTIONNAIRES: CAT_SEVERITY: SEVERE

## 2018-09-21 ASSESSMENT — PAIN DESCRIPTION - LOCATION
LOCATION: ABDOMEN
LOCATION: BACK

## 2018-09-21 ASSESSMENT — PAIN DESCRIPTION - PAIN TYPE
TYPE: CHRONIC PAIN
TYPE: SURGICAL PAIN
TYPE: CHRONIC PAIN

## 2018-09-21 ASSESSMENT — PAIN DESCRIPTION - PROGRESSION: CLINICAL_PROGRESSION: NOT CHANGED

## 2018-09-21 ASSESSMENT — ENCOUNTER SYMPTOMS: SHORTNESS OF BREATH: 1

## 2018-09-21 NOTE — PROGRESS NOTES
09/21/18 0057   NIV Type   Equipment Type V60   Mode CPAP   Mask Type Full face mask   Mask Size Medium   Settings/Measurements   Comfort Level Good   Using Accessory Muscles No   CPAP 10 cmH2O   Resp 17   SpO2 98   FiO2  25 %   Vt Exhaled 447 mL   Mask Leak (lpm) 0 lpm   Skin Protection for O2 Device Yes   Breath Sounds   Right Upper Lobe Diminished   Right Middle Lobe Diminished   Right Lower Lobe Diminished   Left Upper Lobe Diminished   Left Lower Lobe Diminished   Alarm Settings   Alarms On Y   Press Low Alarm 6 cmH2O   High Pressure Alarm 20 cmH2O   Apnea (secs) 20 secs   Resp Rate Low Alarm 6   High Respiratory Rate 40 br/min

## 2018-09-21 NOTE — PROGRESS NOTES
Charge nurse Jennyfer Sheriff received report from pacu while I went to e.r. To get another pt. Pt settled into room 432, his previous room, by charge [employer], as well. Spoke with radiology an hour ago, update received then, just received another call from same radiology staff member Monroe Forbes remains the same on the standpoint of the interventionist radiologist. Pt is scheduled for tunneled catheter hd placement Monday morning at 9 a.m. Their recs were the pt stay and get hd tomorrow, have temp cath removed, pt to go home, then return Monday to have tunneled catheter placed and then go straight to hd from there. Will update nephro. Keenan-avinash called back, he is aware of the above, he recs pt will stay until after Monday.

## 2018-09-21 NOTE — PLAN OF CARE
Problem: Serum Glucose Level - Abnormal:  Goal: Ability to maintain appropriate glucose levels will improve  Ability to maintain appropriate glucose levels will improve   Outcome: Ongoing  Pat BS at  was 214,Lispro and Lantus  provided as per parameter,    Problem: OXYGENATION/RESPIRATORY FUNCTION  Goal: Patient will maintain patent airway    Intervention: MONITOR INTAKE AND OUTPUT  Patient's EF (Ejection Fraction) is greater than 40%    Patient has a past medical history of Aortic stenosis; Arthritis; Asthma; Bilateral hilar adenopathy syndrome; CAD (coronary artery disease); CHF (congestive heart failure) ([facility]); COPD (chronic obstructive pulmonary disease) ([facility] CRF (chronic renal failure); Depression; Diabetes mellitus ([facility] Emphysema of lung ([facility] Gastric ulcer; GERD (gastroesophageal reflux disease); Heart valve problem; Hemodialysis patient [facility]); Hyperlipidemia; Hypertension; Malignant hyperthermia; Neuromuscular disorder ([facility] Numbness and tingling in left arm; Pneumonia; PONV (postoperative nausea and vomiting); Prolonged emergence from general anesthesia; Sleep apnea; Stroke [facility]); TIA (transient ischemic attack); and Unspecified diseases of blood and blood-forming organs. Comorbidities reviewed and education provided. Patient and family's stated goal of care: reduce lower extremity edema prior to discharge    Patient's current functional capacity:  Unable to carry on any physical activity without discomfort. Symptoms of heart failure at rest.    Pt resting in bed at this time on room air. Pt denies shortness of breath. Pt with pitting lower extremity edema.  Patient's weights and intake/output reviewed:    Patient Vitals for the past 96 hrs (Last 3 readings):   Weight   09/20/18 1718 207 lb 10.8 oz (94.2 kg)   09/20/18 1336 214 lb 8.1 oz (97.3 kg)   09/20/18 0534 211 lb (95.7 kg)       Intake/Output Summary (Last 24 hours) at 09/21/18 0356  Last data filed at 09/20/18 1803   Gross per 24 hour Intake             1770 ml   Output             3725 ml   Net            -1955 ml       Patient provided with education on CHF signs/symptoms, causes, discharge medications, daily weights, low sodium diet, activity, and follow-up. Notified patient to call the doctor post discharge if patient experiences shortness of breath, chest pain, swelling, cough, or weight gain of three pounds in a day/five pounds in a week. Also notified patient to call the doctor with dizziness, increased fatigue, decreased or difficulty urinating. Pt verbalized understanding. No additional questions at this time.     Education Time: 5 Minutes

## 2018-09-21 NOTE — PROGRESS NOTES
Hospitalist Progress Note  9/21/2018 4:25 PM  Subjective:   Admit Date: 9/14/2018  PCP: Jessica Hitchcock MD Status: Inpatient  Interval History: Hospital Day: 8, admitted with dyspnea and pulmonary edema. Less dyspnea. Plan for temporary hemodialysis catheter, holding  Plavix for tunneled cath and repositioning of peritoneal dialysis and Tunnel Vas cath placement. History of present illness:   46 y.o. male with ESRD who had been on hemodialysis since earlier in the year (does not remember exactly) who transitioned to peritoneal dialysis a month ago with increased leaking of dialysis catheter. Renal failure apparently a result of contrast nephropathy and diabetes. Right chest tunneled catheter removed a week ago. He also has a left arm fistula that is not functioning. He experienced progressive dyspnea with swelling over the past week. History of COPD diagnosed five years ago and followed by Dr. Sowmya Saldivar. He quit smoking a week ago after 60 pk/yr history of smoking. He is on multiple inhalers and nebulizer at home but does not remember exactly which ones. Appears to be Duonebs and albuterol MDI. History of type 2 diabetes and he has not been on insulin or any other medication since starting hemodilalysis      Pt s/p replacement of PD catheter today  Feels wiped out  Planned removal of R temp HD catheter in am    DIET CARB CONTROL; Low Sodium (2 GM);  Daily Fluid Restriction: 1500 ml - NPO after midnight  Medications:     carvedilol  25 mg Oral BID WC   losartan  50 mg Oral Daily   ipratropium-albuterol  1 vial Inhalation Q4H   dianeal lo-trever 2.5%   Intraperitoneal Q4H   aspirin  81 mg Oral Daily   atorvastatin  40 mg Oral Nightly   citalopram  20 mg Oral Daily   clopidogrel  75 mg Oral Daily   diltiazem  180 mg Oral Daily   isosorbide mononitrate  30 mg Oral Daily   traZODone  150 mg Oral Nightly   insulin lispro SSI   0-6 Units Subcutaneous TID WC / HS   furosemide  60 mg Intravenous BID (9/14, day #3)

## 2018-09-21 NOTE — ANESTHESIA POSTPROCEDURE EVALUATION
Department of Anesthesiology  Postprocedure Note    Patient: Marie Cuevas  MRN: [id number]  YOB: 1970  Date of evaluation: 9/21/2018  Time:  2:17 PM     Procedure Summary     Date:  09/21/18 Room / Location:  [facility] [place] OR 06 / [facility] [place] OR    Anesthesia Start:  0051 Anesthesia Stop:  8102    Procedure:  LAPAROSCOPIC PERITONEAL DIALYSIS CATHETER REPLACEMENT (N/A Abdomen) Diagnosis:  (-)    Surgeon:  Amador Talbot MD Responsible Provider:  Celena Acuna MD    Anesthesia Type:  general ASA Status:  3          Anesthesia Type: No value filed. Ernesto Phase I: Ernesto Score: 8    Ernesto Phase II: Ernesto Score: 8    Last vitals: Reviewed and per EMR flowsheets.        Anesthesia Post Evaluation    Patient location during evaluation: PACU  Patient participation: complete - patient participated  Level of consciousness: awake and alert  Pain score: 0  Airway patency: patent  Nausea & Vomiting: no nausea and no vomiting  Complications: no  Cardiovascular status: blood pressure returned to baseline  Respiratory status: acceptable  Hydration status: stable

## 2018-09-21 NOTE — PLAN OF CARE
Problem: Serum Glucose Level - Abnormal:  Intervention: Monitor blood glucose measurement  Pt is jie and med for bg levels wnl      Problem: OXYGENATION/RESPIRATORY FUNCTION  Goal: Patient will maintain patent airway    Intervention: ASSESS BREATH SOUNDS IN ALL LOBES  Patient's EF (Ejection Fraction) is greater than 40%    Patient has a past medical history of Aortic stenosis; Arthritis; Asthma; Bilateral hilar adenopathy syndrome; CAD (coronary artery disease); CHF (congestive heart failure) ([facility]); COPD (chronic obstructive pulmonary disease) ([facility] CRF (chronic renal failure); Depression; Diabetes mellitus ([facility] Emphysema of lung ([facility] Gastric ulcer; GERD (gastroesophageal reflux disease); Heart valve problem; Hemodialysis patient [facility]); Hyperlipidemia; Hypertension; Neuromuscular disorder ([facility] Numbness and tingling in left arm; Pneumonia; PONV (postoperative nausea and vomiting); Prolonged emergence from general anesthesia; Sleep apnea; Stroke [facility]); TIA (transient ischemic attack); and Unspecified diseases of blood and blood-forming organs. Comorbidities reviewed and education provided. Patient and family's stated goal of care: better understand heart failure and disease management prior to discharge    Patient's current functional capacity:  No limitation of physical activity. Ordinary physical activity does not cause undue fatigue, palpitation, dyspnea. Pt resting in bed at this time on room air. Pt denies shortness of breath. Pt with pitting lower extremity edema.  Patient's weights and intake/output reviewed:    Patient Vitals for the past 96 hrs (Last 3 readings):   Weight   09/21/18 0521 210 lb 3.2 oz (95.3 kg)   09/20/18 1718 207 lb 10.8 oz (94.2 kg)   09/20/18 1336 214 lb 8.1 oz (97.3 kg)       Intake/Output Summary (Last 24 hours) at 09/21/18 1857  Last data filed at 09/21/18 1513   Gross per 24 hour   Intake              810 ml   Output                0 ml   Net              810 ml Patient provided with education on CHF signs/symptoms, causes, discharge medications, daily weights, low sodium diet, activity, and follow-up. Notified patient to call the doctor post discharge if patient experiences shortness of breath, chest pain, swelling, cough, or weight gain of three pounds in a day/five pounds in a week. Also notified patient to call the doctor with dizziness, increased fatigue, decreased or difficulty urinating. Pt verbalized understanding. No additional questions at this time.     Education Time: 30 Minutes

## 2018-09-21 NOTE — PROGRESS NOTES
[address] [place] [address] or Facility: [facility] From: [facility] RE: henok reeves 1970 RM: [id number] pt needs bm. requesting suppository or something. on miralax and docusate. had extra miralax yesterday. no success yet. pt uncomfortable. please review and advise ty [facility]4 PROGRESSIVE CARE    Medications added by dr Douglas Dakin crosscover md to aid in elimination of stool. Pt updated and will request meds as he feels is needed. Pt has not had a bm in about 9 days. Pt eats and drinks.

## 2018-09-21 NOTE — PROGRESS NOTES
Bedside report given to [facility]. Skin assessment completed. Loretta Pro
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
## 2018-09-21 NOTE — PLAN OF CARE
-1375 ml       Patient provided with education on CHF signs/symptoms, causes, discharge medications, daily weights, low sodium diet, activity, and follow-up. Notified patient to call the doctor post discharge if patient experiences shortness of breath, chest pain, swelling, cough, or weight gain of three pounds in a day/five pounds in a week. Also notified patient to call the doctor with dizziness, increased fatigue, decreased or difficulty urinating. Pt verbalized understanding. No additional questions at this time.     Education Time: 10 Minutes

## 2018-09-21 NOTE — PROGRESS NOTES
[facility]   Daily Progress Note    Admit Date:  9/14/2018  HPI:    Chief Complaint   Patient presents with    Shortness of Breath     x 3 days,       Admitted with shortness of breath, weight gain and edema. This was associated with chest pain that was not his usual chest pain - starts as a sharp pain then eases up, random, not necessarily with exertion. Troponin's elevated but felt to be due to CKD/ ESRD on PD.  ECG without ischemic changes. Echo with EF drop to 40-45%, moderate AS (bicuspid AoV). Dobutamine stress echo completed with evaluation of wall motion for ischemia, not able to further evaluate AS - poor images, definity used. PD catheter leaking, to be replaced after off Plavix for 5 days. To have TD catheter place with initiation of HD. Subjective:  Mr. Rashard Ag lying in bed, npo for PD catheter placement today. Reports had a \"toothache\" pain in chest yesterday during dialysis but then resolved post.  Denies any chest pain or shortness of breath today. complains of constipation, still no BM, eating okay.      Objective:   /60   Pulse 78   Temp 99.4 °F (37.4 °C)   Resp 16   Ht 5' 8\" (1.727 m)   Wt 210 lb 3.2 oz (95.3 kg)   SpO2 91%   BMI 31.96 kg/m²       Intake/Output Summary (Last 24 hours) at 09/21/18 1006  Last data filed at 09/21/18 1571   Gross per 24 hour   Intake              600 ml   Output             3725 ml   Net            -3125 ml     Wt Readings from Last 3 Encounters:   09/21/18 210 lb 3.2 oz (95.3 kg)   08/09/18 209 lb (94.8 kg)   08/02/18 212 lb 9.6 oz (96.4 kg)       Telemetry: SR 70-80's  NYHA: IV    Physical Exam:  General:  Awake, alert, NAD  Skin:  Warm and dry  Neck:  Line in place  Chest: few scattered wheezes, diminished   Cardiovascular:  RRR, normal S1S2, + 3/6 CAROL, no g/r  Abdomen:  Soft, nontender, +bowel sounds  Extremities:  Trace RLE edema, 1+ LLE edema    Medications:    losartan  25 mg Oral Nightly    carvedilol  6.25 mg Oral infusion. Normal hyperdynamic response to dobutamine stress. Definity contrast is used. Normal dobutamine stress echocardiogram.    Echo 9/15/18: The left ventricular systolic function is mildly reduced with an ejection fraction of 40 - 45 %. There is global hypokinesis of the left ventricle. Normal left ventricular size with mild concentric left ventricular hypertrophy. Grade II diastolic dysfunction with elevated filling pressure. Changes noted from previous echo on 10-. Mild mitral regurgitation. The left atrium is moderately dilated. The aortic valve is thickened/calcified with decreased leaflet mobility consistent with aortic stenosis. The aortic valve area is calculated at 0.95 cm2 with a maximum pressure gradient of 61 mmHg and a mean pressure gradient of 27 mmHg. This is c/w moderate aortic stenosis. Mild aortic regurgitation. Consider dobutamine stress echo to assess for low flow low gradient aortic stenosis. Echo 1/24/2017:  Summary   Left ventricular systolic function is normal with an ejection fraction of   55-60%. No wall motion abnormalities noted. Mild concentric left ventricular hypertrophy. Grade II diastolic dysfunction with elevated filling pressure. Mildly dilated left atrium. Moderate aortic stenosis. Mild aortic regurgitation. Compared to previous study from 10/27/2016, aortic stenosis has not   progressed. R/LHC 1/23/2017:  LM <20%  LAD 30% w/ patent stent  Cx 20-30%  RCA 20-30%  LVEDP 19  RA 8, RV 41/10, PA 45/14/31, W 15      Nonobstructive CAD  Mild elevated right heart pressures      Echo Oct 2016:   Normal left ventricle size with mild concentric left ventricular hypertrophy.   Normal systolic function with an estimated ejection fraction of 55%.   No regional wall motion abnormalities are seen.   Elevated left ventricular diastolic filling pressure ( E/e' 21 ).    The aortic valve is thickened/calcified with decreased leaflet

## 2018-09-21 NOTE — PROGRESS NOTES
Department of Internal Medicine  Nephrology Progress Note        We are following this patient for ESRD. Sub/interval history  New PD catheter on 9/21  C/o constipation    HD on 9/20 for 3h w  3l UF and post wt 94.2 kg , needed iv albumin times 2  HD on 9/19 for 4h w 1.3 l UF and had episodes of low BP  HD on 9/18 done, c/b by hypotension     ROS: No chest pain/shortness of breath/fever  PSFH: No visitor    Scheduled Meds:   losartan  25 mg Oral Nightly    carvedilol  6.25 mg Oral BID WC    diltiazem  120 mg Oral Daily    heparin (porcine)  3,000 Units Intravenous Once per day on Tue Thu Sat    senna  1 tablet Oral Nightly    torsemide  20 mg Oral Daily    insulin glargine  10 Units Subcutaneous Nightly    pravastatin  40 mg Oral Nightly    polyethylene glycol  17 g Oral Daily    docusate sodium  100 mg Oral BID    pantoprazole  40 mg Oral QAM AC    citalopram  20 mg Oral Daily    isosorbide mononitrate  30 mg Oral Daily    traZODone  150 mg Oral Nightly    insulin lispro  0-6 Units Subcutaneous TID WC    insulin lispro  0-3 Units Subcutaneous Nightly    sodium chloride flush  10 mL Intravenous 2 times per day    heparin (porcine)  5,000 Units Subcutaneous 3 times per day     Continuous Infusions:   dextrose       PRN Meds:. HYDROmorphone, HYDROmorphone, morphine, morphine, oxyCODONE-acetaminophen **OR** oxyCODONE-acetaminophen, diphenhydrAMINE, ondansetron, promethazine, labetalol, hydrALAZINE, meperidine, bupivacaine-EPINEPHrine, albumin human, heparin (porcine), perflutren lipid microspheres, acetaminophen, oxyCODONE-acetaminophen, albuterol sulfate HFA, calcium carbonate, glucose, dextrose, glucagon (rDNA), dextrose, nitroGLYCERIN, sodium chloride flush, ondansetron    Physical Exam:    VITALS:  /72   Pulse 72   Temp 98.6 °F (37 °C) (Temporal)   Resp 13   Ht 5' 8\" (1.727 m)   Wt 210 lb 3.2 oz (95.3 kg)   SpO2 96%   BMI 31.96 kg/m²     General appearance: Seems comfortable, no acute distress. Neck: Trachea midline, thyroid normal.   Lungs:  Non labored breathing, CTA to anterior auscultation. Heart:  S1S2 normal, rub or gallop. + peripheral edema. Abdomen: Soft, non-tender, no organomegaly. Skin: No lesions or rashes, warm to touch. DATA:    CBC:   Lab Results   Component Value Date    WBC 10.8 09/20/2018    RBC 3.15 09/20/2018    HGB 9.7 09/20/2018    HCT 28.6 09/20/2018    MCV 90.9 09/20/2018    MCH 30.8 09/20/2018    MCHC 33.9 09/20/2018    RDW 15.4 09/20/2018     09/20/2018    MPV 8.6 09/20/2018     BMP:    Lab Results   Component Value Date     09/21/2018    K 4.7 09/21/2018    K 3.2 09/17/2018    CL 99 09/21/2018    CO2 25 09/21/2018    BUN 21 09/21/2018    LABALBU 3.9 09/21/2018    CREATININE 2.9 09/21/2018    CALCIUM 8.9 09/21/2018    GFRAA 28 09/21/2018    GFRAA >60 05/17/2013    LABGLOM 23 09/21/2018    GLUCOSE 121 09/21/2018       IMPRESSION/RECOMMENDATIONS:      1- ESRD: We will continue CAPD using 2.5% Dianeal every 4 hours--> stopped PD 9/18   -s/p temp line on 9/18 as pt's last dose of plavix was 9/17, will need TDC- d/w IR nurse   -OP HDU -arranged at [facility] on MWF renae 1115   -HD initiated from 9/18/18   -HD on 9/22 and then MWF schedule   -ok to dc pt once his tunneled dialysis catheter is placed and dialysis on 9/22  2- Leaking Tenckhoff catheter: seen by Dr. Ciro Agustin from surgery and new catheter on 9/21/18   3- Hypokalemia: S/p PRN potassium replacement. 4- HTN: bP low, meds adjusted and hold before HD-decreased coreg, cozaar,   5- Anemia: DAVON and monitor.

## 2018-09-22 LAB
ALBUMIN SERPL-MCNC: 3.6 G/DL (ref 3.4–5)
ANION GAP SERPL CALCULATED.3IONS-SCNC: 11 MMOL/L (ref 3–16)
BASOPHILS ABSOLUTE: 0.1 K/UL (ref 0–0.2)
BASOPHILS RELATIVE PERCENT: 0.8 %
BUN BLDV-MCNC: 33 MG/DL (ref 7–20)
CALCIUM SERPL-MCNC: 8.8 MG/DL (ref 8.3–10.6)
CHLORIDE BLD-SCNC: 96 MMOL/L (ref 99–110)
CO2: 24 MMOL/L (ref 21–32)
CREAT SERPL-MCNC: 4 MG/DL (ref 0.9–1.3)
EOSINOPHILS ABSOLUTE: 0.2 K/UL (ref 0–0.6)
EOSINOPHILS RELATIVE PERCENT: 2.2 %
GFR AFRICAN AMERICAN: 19
GFR NON-AFRICAN AMERICAN: 16
GLUCOSE BLD-MCNC: 108 MG/DL (ref 70–99)
GLUCOSE BLD-MCNC: 140 MG/DL (ref 70–99)
GLUCOSE BLD-MCNC: 169 MG/DL (ref 70–99)
GLUCOSE BLD-MCNC: 91 MG/DL (ref 70–99)
HCT VFR BLD CALC: 26.9 % (ref 40.5–52.5)
HEMOGLOBIN: 9 G/DL (ref 13.5–17.5)
LYMPHOCYTES ABSOLUTE: 1.2 K/UL (ref 1–5.1)
LYMPHOCYTES RELATIVE PERCENT: 13 %
MCH RBC QN AUTO: 30.4 PG (ref 26–34)
MCHC RBC AUTO-ENTMCNC: 33.4 G/DL (ref 31–36)
MCV RBC AUTO: 91 FL (ref 80–100)
MONOCYTES ABSOLUTE: 0.7 K/UL (ref 0–1.3)
MONOCYTES RELATIVE PERCENT: 7 %
NEUTROPHILS ABSOLUTE: 7.1 K/UL (ref 1.7–7.7)
NEUTROPHILS RELATIVE PERCENT: 77 %
PDW BLD-RTO: 14.7 % (ref 12.4–15.4)
PERFORMED ON: ABNORMAL
PERFORMED ON: ABNORMAL
PERFORMED ON: NORMAL
PHOSPHORUS: 3.9 MG/DL (ref 2.5–4.9)
PLATELET # BLD: 134 K/UL (ref 135–450)
PMV BLD AUTO: 8.8 FL (ref 5–10.5)
POTASSIUM SERPL-SCNC: 5.2 MMOL/L (ref 3.5–5.1)
RBC # BLD: 2.96 M/UL (ref 4.2–5.9)
SODIUM BLD-SCNC: 131 MMOL/L (ref 136–145)
WBC # BLD: 9.3 K/UL (ref 4–11)

## 2018-09-22 PROCEDURE — 80069 RENAL FUNCTION PANEL: CPT

## 2018-09-22 PROCEDURE — 6370000000 HC RX 637 (ALT 250 FOR IP): Performed by: NURSE PRACTITIONER

## 2018-09-22 PROCEDURE — P9047 ALBUMIN (HUMAN), 25%, 50ML: HCPCS | Performed by: INTERNAL MEDICINE

## 2018-09-22 PROCEDURE — 6360000002 HC RX W HCPCS: Performed by: INTERNAL MEDICINE

## 2018-09-22 PROCEDURE — 36415 COLL VENOUS BLD VENIPUNCTURE: CPT

## 2018-09-22 PROCEDURE — 6370000000 HC RX 637 (ALT 250 FOR IP): Performed by: INTERNAL MEDICINE

## 2018-09-22 PROCEDURE — 99232 SBSQ HOSP IP/OBS MODERATE 35: CPT | Performed by: NURSE PRACTITIONER

## 2018-09-22 PROCEDURE — 94660 CPAP INITIATION&MGMT: CPT

## 2018-09-22 PROCEDURE — 90937 HEMODIALYSIS REPEATED EVAL: CPT

## 2018-09-22 PROCEDURE — 2580000003 HC RX 258: Performed by: INTERNAL MEDICINE

## 2018-09-22 PROCEDURE — 2700000000 HC OXYGEN THERAPY PER DAY

## 2018-09-22 PROCEDURE — 85025 COMPLETE CBC W/AUTO DIFF WBC: CPT

## 2018-09-22 PROCEDURE — 1200000000 HC SEMI PRIVATE

## 2018-09-22 PROCEDURE — 94761 N-INVAS EAR/PLS OXIMETRY MLT: CPT

## 2018-09-22 RX ORDER — FENTANYL CITRATE 50 UG/ML
25 INJECTION, SOLUTION INTRAMUSCULAR; INTRAVENOUS NIGHTLY
Status: COMPLETED | OUTPATIENT
Start: 2018-09-22 | End: 2018-09-24

## 2018-09-22 RX ADMIN — INSULIN GLARGINE 10 UNITS: 100 INJECTION, SOLUTION SUBCUTANEOUS at 21:02

## 2018-09-22 RX ADMIN — HEPARIN SODIUM 2600 UNITS: 1000 INJECTION INTRAVENOUS; SUBCUTANEOUS at 11:54

## 2018-09-22 RX ADMIN — ONDANSETRON HYDROCHLORIDE 4 MG: 2 INJECTION, SOLUTION INTRAMUSCULAR; INTRAVENOUS at 14:26

## 2018-09-22 RX ADMIN — ALBUMIN (HUMAN) 25 G: 0.25 INJECTION, SOLUTION INTRAVENOUS at 08:41

## 2018-09-22 RX ADMIN — PRAVASTATIN SODIUM 40 MG: 40 TABLET ORAL at 20:59

## 2018-09-22 RX ADMIN — FENTANYL CITRATE 25 MCG: 50 INJECTION, SOLUTION INTRAMUSCULAR; INTRAVENOUS at 22:14

## 2018-09-22 RX ADMIN — DARBEPOETIN ALFA 40 MCG: 40 INJECTION, SOLUTION INTRAVENOUS; SUBCUTANEOUS at 11:39

## 2018-09-22 RX ADMIN — Medication 10 ML: at 13:18

## 2018-09-22 RX ADMIN — HEPARIN SODIUM 5000 UNITS: 5000 INJECTION INTRAVENOUS; SUBCUTANEOUS at 21:03

## 2018-09-22 RX ADMIN — OXYCODONE HYDROCHLORIDE AND ACETAMINOPHEN 1 TABLET: 7.5; 325 TABLET ORAL at 21:00

## 2018-09-22 RX ADMIN — TRAZODONE HYDROCHLORIDE 150 MG: 50 TABLET ORAL at 20:58

## 2018-09-22 RX ADMIN — OXYCODONE HYDROCHLORIDE AND ACETAMINOPHEN 1 TABLET: 7.5; 325 TABLET ORAL at 05:46

## 2018-09-22 RX ADMIN — Medication 10 ML: at 20:59

## 2018-09-22 RX ADMIN — ALBUMIN (HUMAN) 25 G: 0.25 INJECTION, SOLUTION INTRAVENOUS at 10:26

## 2018-09-22 RX ADMIN — CARVEDILOL 6.25 MG: 6.25 TABLET, FILM COATED ORAL at 16:48

## 2018-09-22 RX ADMIN — HEPARIN SODIUM 3000 UNITS: 1000 INJECTION INTRAVENOUS; SUBCUTANEOUS at 07:59

## 2018-09-22 RX ADMIN — HEPARIN SODIUM 5000 UNITS: 5000 INJECTION INTRAVENOUS; SUBCUTANEOUS at 05:46

## 2018-09-22 RX ADMIN — OXYCODONE HYDROCHLORIDE AND ACETAMINOPHEN 1 TABLET: 7.5; 325 TABLET ORAL at 13:18

## 2018-09-22 RX ADMIN — PANTOPRAZOLE SODIUM 40 MG: 40 TABLET, DELAYED RELEASE ORAL at 05:46

## 2018-09-22 RX ADMIN — HEPARIN SODIUM 5000 UNITS: 5000 INJECTION INTRAVENOUS; SUBCUTANEOUS at 14:15

## 2018-09-22 RX ADMIN — CITALOPRAM HYDROBROMIDE 20 MG: 20 TABLET ORAL at 13:18

## 2018-09-22 RX ADMIN — LOSARTAN POTASSIUM 25 MG: 25 TABLET, FILM COATED ORAL at 20:58

## 2018-09-22 RX ADMIN — OXYCODONE HYDROCHLORIDE AND ACETAMINOPHEN 1 TABLET: 7.5; 325 TABLET ORAL at 01:33

## 2018-09-22 ASSESSMENT — PAIN SCALES - GENERAL
PAINLEVEL_OUTOF10: 9
PAINLEVEL_OUTOF10: 8
PAINLEVEL_OUTOF10: 9
PAINLEVEL_OUTOF10: 10
PAINLEVEL_OUTOF10: 9
PAINLEVEL_OUTOF10: 7

## 2018-09-22 ASSESSMENT — PAIN DESCRIPTION - PAIN TYPE: TYPE: ACUTE PAIN

## 2018-09-22 ASSESSMENT — PAIN DESCRIPTION - LOCATION: LOCATION: BACK;NECK

## 2018-09-22 NOTE — FLOWSHEET NOTE
09/21/18 2000   Assessment   Charting Type Shift assessment   Neurological   Neuro (WDL) (awake but drowsy)   Level of Consciousness 0   Orientation Level Oriented X4   Language Clear; Appropriate for developmental age   Cognition Appropriate judgement; Appropriate safety awareness; Appropriate attention/concentration; Appropriate for developmental age; Follows commands   [place] Coma Scale   Eye Opening 4   Best Verbal Response 5   Best Motor Response 6   Robert Coma Scale Score 15   HEENT   HEENT (WDL) WDL   Respiratory   Respiratory (WDL) X   Respiratory Pattern Regular   Respiratory Depth Normal   Respiratory Quality/Effort Unlabored   Chest Assessment Chest expansion symmetrical   L Breath Sounds Diminished   R Breath Sounds Diminished   Breath Sounds   Right Upper Lobe Diminished   Right Middle Lobe Diminished   Right Lower Lobe Diminished   Left Upper Lobe Diminished   Left Lower Lobe Diminished   Cough/Sputum   Cough Productive   Cardiac   Cardiac (WDL) WDL   Cardiac Regularity Regular   Heart Sounds S1, S2   Cardiac Rhythm NSR   Cardiac Monitor   Telemetry Monitor On Yes   Telemetry Audible Yes   Telemetry Alarms Set Yes   Telemetry Box Number [id number]   Gastrointestinal   Abdominal (WDL) (PD cath surgical incision)   GI Symptoms Constipation   Nausea Precipitating Factors Medication   Abdomen Inspection Rotund; Soft   RUQ Bowel Sounds Hypoactive   LUQ Bowel Sounds Hypoactive   RLQ Bowel Sounds Hypoactive   LLQ Bowel Sounds Hypoactive   Peripheral Vascular   Peripheral Vascular (WDL) (PPP x 4)   Edema Right lower extremity   RLE Edema Trace   LLE Edema +1;Pitting   Sensation RUE Full sensation   Sensation LUE Numbness;Tingling   Sensation RLE Full sensation;Pain;Tingling   Sensation LLE Numbness;Pain;Tingling   Skin Color/Condition   Skin Color/Condition (WDL) WDL   Skin Integrity   Skin Integrity (WDL) X  (pd cath removed and new one placed)   Skin Integrity Bruising   Location scattered    Skin Integrity Site 2 Skin Integrity Location 2 Other (Comment)  (PD site )   Location 2 Abd   Musculoskeletal   Musculoskeletal (WDL) (post op weakness)   RUE Full movement   LUE Weakness   RL Extremity Swelling;Walking boot   LL Extremity Swelling   Genitourinary   Genitourinary (WDL) X  (no void)   Flank Tenderness No   Suprapubic Tenderness No   Dysuria No   Urine Assessment   Incontinence No   Urine Color Yellow/straw   Anus/Rectum   Anus/Rectum (WDL) WDL   Incision 09/21/18 Abdomen   Date First Assessed/Time First Assessed: 09/21/18 1157   Location: Abdomen   Wound Assessment Clean;Dry; Intact   [place]-wound Assessment Dry;Clean; Intact   Closure Surgical glue   Odor None   Dressing/Treatment Surgical glue   Dressing Status Clean;Dry; Intact   Psychosocial   Psychosocial (WDL) (calm)

## 2018-09-22 NOTE — PLAN OF CARE
Problem: Falls - Risk of:  Goal: Will remain free from falls  Will remain free from falls   Outcome: Ongoing  patient alert to call out for assistance, will continue to monitor for risk of falls on my shift.   Khushi Orozco RN

## 2018-09-22 NOTE — PROGRESS NOTES
Definity contrast is used. Normal dobutamine stress echocardiogram.    Echo 9/15/18: The left ventricular systolic function is mildly reduced with an ejection fraction of 40 - 45 %. There is global hypokinesis of the left ventricle. Normal left ventricular size with mild concentric left ventricular hypertrophy. Grade II diastolic dysfunction with elevated filling pressure. Changes noted from previous echo on 10-. Mild mitral regurgitation. The left atrium is moderately dilated. The aortic valve is thickened/calcified with decreased leaflet mobility consistent with aortic stenosis. The aortic valve area is calculated at 0.95 cm2 with a maximum pressure gradient of 61 mmHg and a mean pressure gradient of 27 mmHg. This is c/w moderate aortic stenosis. Mild aortic regurgitation. Consider dobutamine stress echo to assess for low flow low gradient aortic stenosis. R/LHC 1/23/2017:  LM <20%  LAD 30% w/ patent stent  Cx 20-30%  RCA 20-30%  LVEDP 19  RA 8, RV 41/10, PA 45/14/31, W 15      Nonobstructive CAD  Mild elevated right heart pressures        Assessment and Plan:    1. Acute on chronic combined systolic and diastolic heart failure   - fluid management per dialysis   - continue daily weights, sodium/fluid restriction     2. CAD/chest pain/elevated troponin   - hx stent to LAD (2015)   - elevated troponin likely secondary to renal failure   - chest pain possibly secondary to volume overload, resolved   - normal dobutamine stress echo this admission    - resume asa and Plavix when okay with general surgery (s/p replacement PD cath yesterday)    3. Hypertension   - controlled   - continue current management    4. Aortic stenosis   - bicuspid AV   - moderate on recent echo    5.  ESRD   - recently transitioned for HD to PD    - s/p PD cath replacement yesterday    - nephrology managing      Thank you very much for allowing me to participate in the care of your patient      Jordin Mayer Sedrick, JAY - CNP, 9/22/2018, 1:20 PM

## 2018-09-22 NOTE — PROGRESS NOTES
Pt returned to floor from dialysis. Pt calm, alert and oriented. Pt VS obtained. Pt in bed watching tv. No new orders.

## 2018-09-22 NOTE — PLAN OF CARE
post discharge if patient experiences shortness of breath, chest pain, swelling, cough, or weight gain of three pounds in a day/five pounds in a week. Also notified patient to call the doctor with dizziness, increased fatigue, decreased or difficulty urinating. Pt verbalized understanding. No additional questions at this time.     Education Time: 10 Minutes

## 2018-09-23 LAB
ALBUMIN SERPL-MCNC: 3.7 G/DL (ref 3.4–5)
ANION GAP SERPL CALCULATED.3IONS-SCNC: 11 MMOL/L (ref 3–16)
BUN BLDV-MCNC: 24 MG/DL (ref 7–20)
CALCIUM SERPL-MCNC: 8.4 MG/DL (ref 8.3–10.6)
CHLORIDE BLD-SCNC: 95 MMOL/L (ref 99–110)
CO2: 27 MMOL/L (ref 21–32)
CREAT SERPL-MCNC: 3.6 MG/DL (ref 0.9–1.3)
GFR AFRICAN AMERICAN: 22
GFR NON-AFRICAN AMERICAN: 18
GLUCOSE BLD-MCNC: 108 MG/DL (ref 70–99)
GLUCOSE BLD-MCNC: 114 MG/DL (ref 70–99)
GLUCOSE BLD-MCNC: 153 MG/DL (ref 70–99)
GLUCOSE BLD-MCNC: 156 MG/DL (ref 70–99)
GLUCOSE BLD-MCNC: 198 MG/DL (ref 70–99)
PERFORMED ON: ABNORMAL
PHOSPHORUS: 3.1 MG/DL (ref 2.5–4.9)
POTASSIUM SERPL-SCNC: 4.3 MMOL/L (ref 3.5–5.1)
SODIUM BLD-SCNC: 133 MMOL/L (ref 136–145)

## 2018-09-23 PROCEDURE — 6370000000 HC RX 637 (ALT 250 FOR IP): Performed by: NURSE PRACTITIONER

## 2018-09-23 PROCEDURE — 6360000002 HC RX W HCPCS: Performed by: INTERNAL MEDICINE

## 2018-09-23 PROCEDURE — 2700000000 HC OXYGEN THERAPY PER DAY

## 2018-09-23 PROCEDURE — 6370000000 HC RX 637 (ALT 250 FOR IP): Performed by: INTERNAL MEDICINE

## 2018-09-23 PROCEDURE — 80069 RENAL FUNCTION PANEL: CPT

## 2018-09-23 PROCEDURE — 6370000000 HC RX 637 (ALT 250 FOR IP): Performed by: HOSPITALIST

## 2018-09-23 PROCEDURE — 2580000003 HC RX 258: Performed by: INTERNAL MEDICINE

## 2018-09-23 PROCEDURE — 1200000000 HC SEMI PRIVATE

## 2018-09-23 PROCEDURE — 94660 CPAP INITIATION&MGMT: CPT

## 2018-09-23 PROCEDURE — 99232 SBSQ HOSP IP/OBS MODERATE 35: CPT | Performed by: INTERNAL MEDICINE

## 2018-09-23 PROCEDURE — 94761 N-INVAS EAR/PLS OXIMETRY MLT: CPT

## 2018-09-23 PROCEDURE — 36415 COLL VENOUS BLD VENIPUNCTURE: CPT

## 2018-09-23 RX ADMIN — SENNOSIDES 8.6 MG: 8.6 TABLET, FILM COATED ORAL at 09:00

## 2018-09-23 RX ADMIN — CARVEDILOL 6.25 MG: 6.25 TABLET, FILM COATED ORAL at 18:25

## 2018-09-23 RX ADMIN — HEPARIN SODIUM 5000 UNITS: 5000 INJECTION INTRAVENOUS; SUBCUTANEOUS at 06:16

## 2018-09-23 RX ADMIN — HEPARIN SODIUM 5000 UNITS: 5000 INJECTION INTRAVENOUS; SUBCUTANEOUS at 13:46

## 2018-09-23 RX ADMIN — OXYCODONE HYDROCHLORIDE AND ACETAMINOPHEN 1 TABLET: 7.5; 325 TABLET ORAL at 09:21

## 2018-09-23 RX ADMIN — DILTIAZEM HYDROCHLORIDE 120 MG: 120 CAPSULE, COATED, EXTENDED RELEASE ORAL at 09:00

## 2018-09-23 RX ADMIN — TRAZODONE HYDROCHLORIDE 150 MG: 50 TABLET ORAL at 20:42

## 2018-09-23 RX ADMIN — OXYCODONE HYDROCHLORIDE AND ACETAMINOPHEN 1 TABLET: 7.5; 325 TABLET ORAL at 16:29

## 2018-09-23 RX ADMIN — FENTANYL CITRATE 25 MCG: 50 INJECTION, SOLUTION INTRAMUSCULAR; INTRAVENOUS at 20:53

## 2018-09-23 RX ADMIN — Medication 10 ML: at 09:01

## 2018-09-23 RX ADMIN — CARVEDILOL 6.25 MG: 6.25 TABLET, FILM COATED ORAL at 09:00

## 2018-09-23 RX ADMIN — Medication 10 ML: at 20:43

## 2018-09-23 RX ADMIN — CITALOPRAM HYDROBROMIDE 20 MG: 20 TABLET ORAL at 09:01

## 2018-09-23 RX ADMIN — PANTOPRAZOLE SODIUM 40 MG: 40 TABLET, DELAYED RELEASE ORAL at 06:16

## 2018-09-23 RX ADMIN — INSULIN GLARGINE 10 UNITS: 100 INJECTION, SOLUTION SUBCUTANEOUS at 20:45

## 2018-09-23 RX ADMIN — TORSEMIDE 20 MG: 20 TABLET ORAL at 09:00

## 2018-09-23 RX ADMIN — DOCUSATE SODIUM 100 MG: 100 CAPSULE, LIQUID FILLED ORAL at 09:00

## 2018-09-23 RX ADMIN — HEPARIN SODIUM 5000 UNITS: 5000 INJECTION INTRAVENOUS; SUBCUTANEOUS at 20:55

## 2018-09-23 RX ADMIN — ISOSORBIDE MONONITRATE 30 MG: 30 TABLET, EXTENDED RELEASE ORAL at 09:01

## 2018-09-23 RX ADMIN — OXYCODONE HYDROCHLORIDE AND ACETAMINOPHEN 1 TABLET: 7.5; 325 TABLET ORAL at 20:43

## 2018-09-23 RX ADMIN — LOSARTAN POTASSIUM 25 MG: 25 TABLET, FILM COATED ORAL at 20:43

## 2018-09-23 RX ADMIN — PRAVASTATIN SODIUM 40 MG: 40 TABLET ORAL at 20:42

## 2018-09-23 ASSESSMENT — PAIN SCALES - GENERAL
PAINLEVEL_OUTOF10: 8
PAINLEVEL_OUTOF10: 8
PAINLEVEL_OUTOF10: 6
PAINLEVEL_OUTOF10: 9
PAINLEVEL_OUTOF10: 9

## 2018-09-23 ASSESSMENT — PAIN DESCRIPTION - LOCATION: LOCATION: BACK

## 2018-09-23 NOTE — PROGRESS NOTES
Cardiovascular Progress Note      Chief Complaint:   Chief Complaint   Patient presents with    Shortness of Breath     x 3 days,      Impression/Recommendations:    Acute Combined Systolic and Diastolic HF (53-56% by 7/98/74 TTE; Moderate AS (bicuspid aortic valve))  Elevated troponins, plateau at 8.65, in setting of ESRD and volume overload   CAD (patent LAD stent and mild residual nonobstructive disease by January 2017 Cath)   COPD  ESRD, s/p replacement PD cath   PVD  ZAINAB  DM  Nicotine dependence  HTN, accelerated   HLD        Continue home Statin as well as BB/CCB/Long acting nitrate. Ok to dc pt once his tunneled dialysis catheter is placed tomorrow and DAPT restarted per surgeon's OK. Please call if can be of further assistance. Interval History:  Lebron Cortes has been without chest pain since day 2 and volume better controlled. Temporary HD line in right nexk and new PD cath. Awaiting procedure tomorrow. No complaints. Tele: SR 70s no events  Dobutamine stress echo 9/17/18:    Baseline nonspecific ST changes.   PVC's during infusion.   Patient developed bilateral jaw tightness 4/10, likely related to Dobutamine. Symptoms resolved with rest.   No EKG changes of stress induced left ventricular ischemia.   Dobutamine stress echo shows normal baseline EF at 55%. No regional wall motion abnormality at rest or post dobutamine infusion.    Normal hyperdynamic response to dobutamine stress.  Definity contrast is used.   Normal dobutamine stress echocardiogram.    Medications:    darbepoetin siddharth-polysorbate (ARANESP) injection 40 mcg Weekly - Monday   fentaNYL (SUBLIMAZE) injection 25 mcg Nightly   senna (SENOKOT) tablet 8.6 mg BID   bisacodyl (DULCOLAX) suppository 10 mg Daily PRN   albuterol (PROVENTIL) nebulizer solution 2.5 mg Q6H PRN   losartan (COZAAR) tablet 25 mg Nightly   carvedilol (COREG) tablet 6.25 mg BID WC   diltiazem (CARDIZEM CD) extended release capsule 120 mg Daily   heparin (porcine) GENERAL: Well developed, well nourished, no acute distress  NEUROLOGICAL: Alert and oriented x3  PSYCH: Normal mood and affect   SKIN: Warm and dry, without lesions  HEENT: Normocephalic, atraumatic, Sclera non-icteric, mucous membranes moist  NECK: supple, JVP normal, thyroid not enlarged   CAROTID: Normal upstroke, no bruits  CARDIAC: Normal PMI, regular rate and rhythm, normal S1S2, no murmur, rub  RESPIRATORY: Normal respiratory effort, clear to auscultation bilaterally  EXTREMITIES: No cyanosis, clubbing or edema, palpable pulses bilaterally   MUSCULOSKELETAL: No joint swelling or tenderness, no chest wall tenderness  GASTROINTESTINAL:  soft, non-tender, no bruit      Data Review:    CBC:   Recent Labs      09/20/18   1745  09/22/18   0507   WBC  10.8  9.3   HGB  9.7*  9.0*   HCT  28.6*  26.9*   MCV  90.9  91.0   PLT  136  134*     BMP:   Recent Labs      09/21/18   0506  09/22/18   0507  09/23/18   0432   NA  135*  131*  133*   K  4.7  5.2*  4.3   CL  99  96*  95*   CO2  25  24  27   PHOS  2.9  3.9  3.1   BUN  21*  33*  24*   CREATININE  2.9*  4.0*  3.6*   GFRAA  28*  19*  22*       Vianey Gross D.O., [facility] [place]  Interventional Cardiology     o: [phone]  [facility] [address] [place], [address]      NOTE:  This report was transcribed using voice recognition software. Every effort was made to ensure accuracy; however, inadvertent computerized transcription errors may be present.

## 2018-09-23 NOTE — PROGRESS NOTES
[facility] [address] [place] [address] [facility] and Tereso Bowen performed complete skin assessment on transfer. Assessment revealed no new skin issues. Upon transferring patient to ordered level of care, patients medications were gathered from the following locations and given to receiving nurse during bedside report:      Lock Box in room :     [x] Yes   [] No   Pyxis Bin:       [x] Yes   [] No      Pyxis Refrigerator:      [x] Yes   [] No   Tube System:       [x] Yes   [] No   LDA's documented:  [x] Yes   [] No          The following paperwork was transferred with patient:    Blue medication book:       [x] Yes   [] No      12 hour chart check:       [x] Yes   [] No   Patient belongings:       [x] Yes   [] No      Continuous pulse ox:   [x] Yes   [] No      [x] N/A      Tele assigned to patient and placed on patient prior to transfer. CMU Notified at Transfer: [x] Yes   [] No     Name of [address] [place] [address] Staff:  Mariela____________________________       MD notified via Perfect Serve:   [x] Yes   [] No    Family notified of transfer:    [x] Yes   [] No    Spoke with: Wife at bedside.  ___________________________________

## 2018-09-23 NOTE — PROGRESS NOTES
Hospitalist Progress Note  9/23/2018 1:27 PM  Subjective:   Admit Date: 9/14/2018  PCP: Ofelia Solomon MD  Status: Inpatient  Interval History: Hospital Day: 10, comfortable and slept better overnight with Fentanyl and used CPAP. Off oxygen. He has not been smoking. Does not require a nicotine patch. History of present illness:   48 y. o. male with ESRD who had been on hemodialysis since earlier in the year (does not remember exactly) who transitioned to peritoneal dialysis a month ago with increased leaking of dialysis catheter.  Renal failure apparently a result of contrast nephropathy and diabetes.  Right chest tunneled catheter removed a week ago. [facility] also has a left arm fistula that is not functioning.  He experienced progressive dyspnea with swelling over the past week.  History of COPD diagnosed five years ago and followed by Dr. Kar Jordan quit smoking a week ago after 61 pk/yr history of smoking. [facility] is on multiple inhalers and nebulizer at home but does not remember exactly which ones.  Appears to be Duonebs and albuterol MDI.  History of type 2 diabetes and he has not been on insulin or any other medication since starting hemodilalysis. He is concerned about ability to sleep with CPAP. 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  He received a one time dose of Fentanyl 25 mcg on 9/18 which worked very well. Less dyspnea. No need for supplemental oxygen. Surgery planned for Monday. DIET RENAL; Low Sodium (2 GM);  Daily Fluid Restriction: 1500 ml; Renal, Low Potassium  New peritoneal dialysis catheter (9/21)  Hemodialysis 9/18, 9/19, 9/20, 9/22, pending 9/24 after surgery    Medications:     darbepoetin siddharth  40 mcg Intravenous Weekly - Monday   fentanNYL  25 mcg Intravenous Nightly   senna  1 tablet Oral BID   losartan  25 mg Oral Nightly   carvedilol  6.25 mg Oral BID WC   diltiazem  120 mg Oral Daily   torsemide  20 mg Oral (40-45%) with moderate aortic stenosis.  stable   6. History of CAD with elevated troponin (patent LAD stent and mild residual nonobstructive disease by January 2017 Cath) continues on aspirin 81 mg, Plavix 75 mg, carvedilol 12.5 mg BID, and atorvastatin 40 mg nightly.  Appreciate cardiology evaluation. 7. Aortic stenosis with bicuspid aortic valve.    TTE artic valve area is calculated at 0.95 cm2 with a maximum pressure  gradient of 61 mmHg and a mean pressure gradient of 27 mmHg.  CT surgery evaluation pending dobutamine stress test.   8. Type 2 Diabetes (uncontrolled, HgbA1c 7.4% 3/19/18):  Cover with a \"sliding scale\" lispro moderate scale prandial correction insulin.   Controlled carbohydrate diet. 9. Obstructive sleep apnea treated with CPAP from home. 10. Hypertension continued on home meds. 11. Nicotine dependence:  Declined nicotine replacement therapy at this time.       Advance Directive: Full code, confirmed by patient. DVT prophylaxis with heparin 5,000 unit sub-Q three times per day. Follow platelets, decreasing but stable. Discharge planning: [facility] till possibly Monday for Tunnel Cath placement followed by hemodialysis. Plan for discharge on Tuesday, Sept 26 or possibly late Monday, Sept 25.         Nicole Ott MD  [facility]

## 2018-09-23 NOTE — PROGRESS NOTES
4 Eyes Skin Assessment     The patient is being assess for  Admission    I agree that 2 RN's have performed a thorough Head to Toe Skin Assessment on the patient. ALL assessment sites listed below have been assessed. Areas assessed by both nurses: Carla and Kyara  [x]   Head, Face, and Ears   [x]   Shoulders, Back, and Chest  [x]   Arms, Elbows, and Hands   [x]   Coccyx, Sacrum, and Ischum  [x]   Legs, Feet, and Heels        Does the Patient have Skin Breakdown?   No         Isaac Prevention initiated:  No   Wound Care Orders initiated:  No      [facility] nurse consulted for Pressure Injury (Stage 3,4, Unstageable, DTI, NWPT, and Complex wounds):  No      Nurse 1 eSignature: Electronically signed by Hafsa Irwin RN on 9/23/18 at 5:48 PM    **SHARE this note so that the co-signing nurse is able to place an eSignature**    Nurse 2 eSignature: Electronically signed by Veronica Jack RN on 9/23/18 at 5:58 PM

## 2018-09-23 NOTE — PROGRESS NOTES
Department of Internal Medicine  Nephrology Progress Note        We are following this patient for ESRD.       Sub/interval history  Feels better    HD on 9/22 w 3l UF , 99.6--->95.2 kg; 50 g iv albumin required  HD on 9/20 for 3h w  3l UF and post wt 94.2 kg , needed iv albumin times 2  HD on 9/19 for 4h w 1.3 l UF and had episodes of low BP  HD on 9/18 done, c/b by hypotension     New PD catheter on 9/21    ROS: No chest pain/shortness of breath/fever  PSFH: No visitor    Scheduled Meds:   darbepoetin siddharth-polysorbate  40 mcg Intravenous Weekly - Monday    fentanNYL  25 mcg Intravenous Nightly    senna  1 tablet Oral BID    losartan  25 mg Oral Nightly    carvedilol  6.25 mg Oral BID WC    diltiazem  120 mg Oral Daily    heparin (porcine)  3,000 Units Intravenous Once per day on Tue Thu Sat    torsemide  20 mg Oral Daily    insulin glargine  10 Units Subcutaneous Nightly    pravastatin  40 mg Oral Nightly    polyethylene glycol  17 g Oral Daily    docusate sodium  100 mg Oral BID    pantoprazole  40 mg Oral QAM AC    citalopram  20 mg Oral Daily    isosorbide mononitrate  30 mg Oral Daily    traZODone  150 mg Oral Nightly    insulin lispro  0-6 Units Subcutaneous TID WC    insulin lispro  0-3 Units Subcutaneous Nightly    sodium chloride flush  10 mL Intravenous 2 times per day    heparin (porcine)  5,000 Units Subcutaneous 3 times per day     Continuous Infusions:   dextrose       PRN Meds:.bisacodyl, albuterol, albumin human, heparin (porcine), perflutren lipid microspheres, acetaminophen, oxyCODONE-acetaminophen, albuterol sulfate HFA, calcium carbonate, glucose, dextrose, glucagon (rDNA), dextrose, nitroGLYCERIN, sodium chloride flush, ondansetron    Physical Exam:    VITALS:  BP (!) 142/74   Pulse 79   Temp 99.1 °F (37.3 °C) (Oral)   Resp 16   Ht 5' 9\" (1.753 m)   Wt 211 lb (95.7 kg)   SpO2 (!) 88%   BMI 31.16 kg/m²     General appearance: Seems comfortable, no acute

## 2018-09-23 NOTE — PROGRESS NOTES
4 Eyes Skin Assessment     The patient is being assess for  Admission    I agree that 2 RN's have performed a thorough Head to Toe Skin Assessment on the patient. ALL assessment sites listed below have been assessed. Areas assessed by both nurses:   Carla and Kyara  [x]   Head, Face, and Ears   [x]   Shoulders, Back, and Chest  [x]   Arms, Elbows, and Hands   [x]   Coccyx, Sacrum, and Ischum  [x]   Legs, Feet, and Heels        Does the Patient have Skin Breakdown?   No         Isaac Prevention initiated:  No   Wound Care Orders initiated:  No      [facility] nurse consulted for Pressure Injury (Stage 3,4, Unstageable, DTI, NWPT, and Complex wounds):  No      Nurse 1 eSignature: Electronically signed by Atif Powell RN on 9/23/18 at 5:50 PM    **SHARE this note so that the co-signing nurse is able to place an eSignature**    Nurse 2 eSignature: {Esignature:[id number]}

## 2018-09-23 NOTE — FLOWSHEET NOTE
Pt is up AO, stated he is having some lower back pain. Pt said overall he is feeling okay. VS obtained. Call light is within reach.

## 2018-09-23 NOTE — PLAN OF CARE
signs/symptoms, causes, discharge medications, daily weights, low sodium diet, activity, and follow-up. Notified patient to call the doctor post discharge if patient experiences shortness of breath, chest pain, swelling, cough, or weight gain of three pounds in a day/five pounds in a week. Also notified patient to call the doctor with dizziness, increased fatigue, decreased or difficulty urinating. Pt demonstrates understanding. No additional questions at this time.     Education Time: 10 Minutes

## 2018-09-24 LAB
ALBUMIN SERPL-MCNC: 3.4 G/DL (ref 3.4–5)
ANION GAP SERPL CALCULATED.3IONS-SCNC: 11 MMOL/L (ref 3–16)
BUN BLDV-MCNC: 39 MG/DL (ref 7–20)
CALCIUM SERPL-MCNC: 8.5 MG/DL (ref 8.3–10.6)
CHLORIDE BLD-SCNC: 91 MMOL/L (ref 99–110)
CO2: 26 MMOL/L (ref 21–32)
CREAT SERPL-MCNC: 4.7 MG/DL (ref 0.9–1.3)
GFR AFRICAN AMERICAN: 16
GFR NON-AFRICAN AMERICAN: 13
GLUCOSE BLD-MCNC: 103 MG/DL (ref 70–99)
GLUCOSE BLD-MCNC: 114 MG/DL (ref 70–99)
GLUCOSE BLD-MCNC: 124 MG/DL (ref 70–99)
GLUCOSE BLD-MCNC: 156 MG/DL (ref 70–99)
PERFORMED ON: ABNORMAL
PHOSPHORUS: 3.9 MG/DL (ref 2.5–4.9)
POTASSIUM SERPL-SCNC: 4.3 MMOL/L (ref 3.5–5.1)
SODIUM BLD-SCNC: 128 MMOL/L (ref 136–145)

## 2018-09-24 PROCEDURE — 6360000002 HC RX W HCPCS: Performed by: INTERNAL MEDICINE

## 2018-09-24 PROCEDURE — 80069 RENAL FUNCTION PANEL: CPT

## 2018-09-24 PROCEDURE — 6370000000 HC RX 637 (ALT 250 FOR IP): Performed by: INTERNAL MEDICINE

## 2018-09-24 PROCEDURE — 6370000000 HC RX 637 (ALT 250 FOR IP): Performed by: NURSE PRACTITIONER

## 2018-09-24 PROCEDURE — 36415 COLL VENOUS BLD VENIPUNCTURE: CPT

## 2018-09-24 PROCEDURE — 90937 HEMODIALYSIS REPEATED EVAL: CPT

## 2018-09-24 PROCEDURE — 2580000003 HC RX 258: Performed by: INTERNAL MEDICINE

## 2018-09-24 PROCEDURE — 1200000000 HC SEMI PRIVATE

## 2018-09-24 PROCEDURE — 99232 SBSQ HOSP IP/OBS MODERATE 35: CPT | Performed by: NURSE PRACTITIONER

## 2018-09-24 PROCEDURE — 94660 CPAP INITIATION&MGMT: CPT

## 2018-09-24 RX ADMIN — Medication 10 ML: at 22:21

## 2018-09-24 RX ADMIN — HEPARIN SODIUM 5000 UNITS: 5000 INJECTION INTRAVENOUS; SUBCUTANEOUS at 06:06

## 2018-09-24 RX ADMIN — CARVEDILOL 6.25 MG: 6.25 TABLET, FILM COATED ORAL at 18:56

## 2018-09-24 RX ADMIN — PANTOPRAZOLE SODIUM 40 MG: 40 TABLET, DELAYED RELEASE ORAL at 06:06

## 2018-09-24 RX ADMIN — TRAZODONE HYDROCHLORIDE 150 MG: 50 TABLET ORAL at 22:12

## 2018-09-24 RX ADMIN — OXYCODONE HYDROCHLORIDE AND ACETAMINOPHEN 1 TABLET: 7.5; 325 TABLET ORAL at 18:53

## 2018-09-24 RX ADMIN — OXYCODONE HYDROCHLORIDE AND ACETAMINOPHEN 1 TABLET: 7.5; 325 TABLET ORAL at 06:09

## 2018-09-24 RX ADMIN — DARBEPOETIN ALFA 40 MCG: 40 INJECTION, SOLUTION INTRAVENOUS; SUBCUTANEOUS at 14:16

## 2018-09-24 RX ADMIN — HEPARIN SODIUM 2600 UNITS: 1000 INJECTION INTRAVENOUS; SUBCUTANEOUS at 17:53

## 2018-09-24 RX ADMIN — OXYCODONE HYDROCHLORIDE AND ACETAMINOPHEN 1 TABLET: 7.5; 325 TABLET ORAL at 22:13

## 2018-09-24 RX ADMIN — FENTANYL CITRATE 25 MCG: 50 INJECTION, SOLUTION INTRAMUSCULAR; INTRAVENOUS at 22:59

## 2018-09-24 RX ADMIN — HEPARIN SODIUM 3000 UNITS: 1000 INJECTION INTRAVENOUS; SUBCUTANEOUS at 14:14

## 2018-09-24 RX ADMIN — Medication 10 ML: at 09:00

## 2018-09-24 RX ADMIN — PRAVASTATIN SODIUM 40 MG: 40 TABLET ORAL at 22:12

## 2018-09-24 RX ADMIN — INSULIN GLARGINE 10 UNITS: 100 INJECTION, SOLUTION SUBCUTANEOUS at 22:12

## 2018-09-24 ASSESSMENT — PAIN SCALES - GENERAL
PAINLEVEL_OUTOF10: 10
PAINLEVEL_OUTOF10: 9
PAINLEVEL_OUTOF10: 10
PAINLEVEL_OUTOF10: 9
PAINLEVEL_OUTOF10: 10
PAINLEVEL_OUTOF10: 10
PAINLEVEL_OUTOF10: 5
PAINLEVEL_OUTOF10: 10

## 2018-09-24 ASSESSMENT — PAIN DESCRIPTION - PAIN TYPE
TYPE: CHRONIC PAIN
TYPE: ACUTE PAIN
TYPE: ACUTE PAIN

## 2018-09-24 ASSESSMENT — PAIN DESCRIPTION - DESCRIPTORS
DESCRIPTORS: ACHING

## 2018-09-24 ASSESSMENT — PAIN DESCRIPTION - LOCATION
LOCATION: BACK

## 2018-09-24 ASSESSMENT — PAIN DESCRIPTION - ORIENTATION
ORIENTATION: LOWER

## 2018-09-24 ASSESSMENT — PAIN DESCRIPTION - FREQUENCY
FREQUENCY: CONTINUOUS

## 2018-09-24 NOTE — PLAN OF CARE
Problem: Nutrition  Goal: Optimal nutrition therapy  Outcome: Ongoing  Nutrition Problem: Inadequate oral intake  Intervention: Food and/or Nutrient Delivery: Continue current diet, Continue current ONS  Nutritional Goals: Pt will have PO intakes of 76% or more this admission

## 2018-09-24 NOTE — PROGRESS NOTES
Shift assessment complete, VSS, medications given (see MAR). Pt complained of body pain 9 out of 10 (scale of 0-10). Pt given PRN percocet and nightly fentanyl. Will reassess pt satisfaction of intervention. Pt currently denies any other needs at this time. Call light within reach, wheels locked, side rails up x2.  Will continue to monitor and assess

## 2018-09-24 NOTE — PLAN OF CARE
Patient's EF (Ejection Fraction) is less than 40%    Patient has a past medical history of Aortic stenosis; Arthritis; Asthma; Bilateral hilar adenopathy syndrome; CAD (coronary artery disease); CHF (congestive heart failure) ([facility]); COPD (chronic obstructive pulmonary disease) ([facility] CRF (chronic renal failure); Depression; Diabetes mellitus ([facility] Emphysema of lung ([facility] Gastric ulcer; GERD (gastroesophageal reflux disease); Heart valve problem; Hemodialysis patient [facility]); Hyperlipidemia; Hypertension; Neuromuscular disorder ([facility] Numbness and tingling in left arm; Pneumonia; PONV (postoperative nausea and vomiting); Prolonged emergence from general anesthesia; Sleep apnea; Stroke [facility]); TIA (transient ischemic attack); and Unspecified diseases of blood and blood-forming organs. Comorbidities reviewed and education provided. Patient and family's stated goal of care: reduce lower extremity edema prior to discharge    Patient's current functional capacity:  No limitation of physical activity. Ordinary physical activity does not cause undue fatigue, palpitation, dyspnea. Pt resting in bed at this time on room air. Pt denies shortness of breath. Pt with nonpitting lower extremity edema. Patient's weights and intake/output reviewed:    Patient Vitals for the past 96 hrs (Last 3 readings):   Weight   09/24/18 0609 223 lb 1.7 oz (101.2 kg)   09/23/18 0435 211 lb (95.7 kg)   09/22/18 1223 209 lb 14.1 oz (95.2 kg)       Intake/Output Summary (Last 24 hours) at 09/24/18 1227  Last data filed at 09/24/18 1043   Gross per 24 hour   Intake              840 ml   Output              180 ml   Net              660 ml       Patient provided with education on CHF signs/symptoms, causes, discharge medications, daily weights, low sodium diet, activity, and follow-up.  Notified patient to call the doctor post discharge if patient experiences shortness of breath, chest pain, swelling, cough, or weight gain of three pounds in a

## 2018-09-24 NOTE — PROGRESS NOTES
leak.   New PD catheter placed on 9/24  - He has a nice working left upper arm AVF. Steal symptoms.   Now refusing to let us cannulate it  - Seen on dialysis, now with a temp line awaiting TDC placement     Hypertension:  Low with dialysis, d/c losartan     Anemia of chronic disease-CKD:  Hb at target     Plan/     Seen on HD  UF as tolerated  Needs TDC, then he is ready for discharge  It is very unfortunate that he refuses to let us cannulate a perfectly good fistula ( he did not tell us this prior to getting it placed, back then he did nothing but complain about the [facility] and wanted it out )  -----------------------------  Shruthi Teresa M.D.   [facility]

## 2018-09-24 NOTE — PROGRESS NOTES
Hospitalist Progress Note  9/24/2018 2:41 PM  Subjective:   Admit Date: 9/14/2018  PCP: Giana Rees MD  Status: Inpatient  Interval History: Hospital Day: 11,  with Fentanyl and used CPAP. No chest pain or shortness of breath. Unable to place tunneled dialysis catheter today as he was inadvertently given a dose of scheduled heparin this morning prior to the procedure. History of present illness:   48 y. o. male with ESRD who had been on hemodialysis since earlier in the year (does not remember exactly) who transitioned to peritoneal dialysis a month ago with increased leaking of dialysis catheter.  Renal failure apparently a result of contrast nephropathy and diabetes.  Right chest tunneled catheter removed a week ago. Adam Larios also has a left arm fistula but due to steal syndrome symptoms he has not been allowing them to cannulate it. Adam Larios experienced progressive dyspnea with swelling over the past week.        DIET RENAL; Low Sodium (2 GM);  Daily Fluid Restriction: 1500 ml; Renal, Low Potassium  New peritoneal dialysis catheter (9/21)  Hemodialysis 9/18, 9/19, 9/20, 9/22, pending 9/24 after surgery    Medications:     darbepoetin siddharth  40 mcg Intravenous Weekly - Monday   fentanNYL  25 mcg Intravenous Nightly   senna  1 tablet Oral BID   losartan  25 mg Oral Nightly   carvedilol  6.25 mg Oral BID WC   diltiazem  120 mg Oral Daily   torsemide  20 mg Oral Daily   insulin glargine  10 Units Subcutaneous Nightly   pravastatin  40 mg Oral Nightly   polyethylene glycol  17 g Oral Daily   docusate sodium  100 mg Oral BID   pantoprazole  40 mg Oral QAM AC   citalopram  20 mg Oral Daily   isosorbide mononitrate  30 mg Oral Daily   traZODone  150 mg Oral Nightly   insulin lispro SSI  0-6 Units Subcutaneous TID WC / HS   heparin (porcine)  5,000 Units Subcutaneous 3 times per day     Recent Labs      09/22/18   0507   WBC  9.3   HGB  9.0*   PLT  134*   MCV  91.0     Recent Labs      09/22/18   0507  09/23/18   0432 (patent LAD stent and mild residual nonobstructive disease by January 2017 Cath) continues on aspirin 81 mg, Plavix 75 mg, carvedilol 12.5 mg BID, and atorvastatin 40 mg nightly.  Appreciate cardiology evaluation. 6. Aortic stenosis with bicuspid aortic valve.   Moderate by echo. 7. Type 2 Diabetes (uncontrolled, HgbA1c 7.4% 3/19/18):  Cover with a \"sliding scale\" lispro moderate scale prandial correction insulin.   Controlled carbohydrate diet. 8. Obstructive sleep apnea treated with CPAP from home. 9. Hypertension continued on home meds. 10. Nicotine dependence:  Declined nicotine replacement therapy at this time.       Advance Directive: Full code, confirmed by patient. DVT prophylaxis with heparin 5,000 unit sub-Q three times per day. Discharge planning:  Possible discharge on 9/25/2018 after tunneled dialysis catheter placement.       Alirio Hernandez MD  [facility] Hospitalist

## 2018-09-24 NOTE — PROGRESS NOTES
(g): 84-91    Estimated Intake vs Estimated Needs: Intake Meets Needs    Nutrition Risk Level: Moderate    Nutrition Interventions:   Continue current diet, Continue current ONS  Continued Inpatient Monitoring    Nutrition Evaluation:   · Evaluation: Progressing toward goals   · Goals: Pt will have PO intakes of 76% or more this admission    · Monitoring: Meal Intake, Supplement Intake, Pertinent Labs    See Adult Nutrition Doc Flowsheet for more detail.      Electronically signed by Ata Lopez RD, LD on 9/24/18 at 11:24 AM    Contact Number: [phone]

## 2018-09-24 NOTE — PROGRESS NOTES
Araceli   Daily Progress Note    Admit Date:  9/14/2018  HPI:    Chief Complaint   Patient presents with    Shortness of Breath     x 3 days,       Admitted with shortness of breath, weight gain and edema. This was associated with chest pain that was not his usual chest pain - starts as a sharp pain then eases up, random, not necessarily with exertion. Troponin's elevated but felt to be due to CKD/ ESRD on PD.  ECG without ischemic changes. Echo with EF drop to 40-45%, moderate AS (bicuspid AoV). Dobutamine stress echo completed with evaluation of wall motion for ischemia, not able to further evaluate AS - poor images, definity used. PD catheter replaced on Friday 2/29/69 without complication  Tunneled HD catheter to be placed today but patient received heparin sq (no orders for procedure nor to hold heparin)    Subjective:  Mr. Rashard Ag seen during dialysis, tolerating well. BP stable. He complains of shortness of breath and abdominal distention. Abdomen remains sore since surgery on Friday.      Objective:   BP (!) 93/51   Pulse 71   Temp 96.2 °F (35.7 °C)   Resp 16   Ht 5' 9\" (1.753 m)   Wt 214 lb 11.7 oz (97.4 kg)   SpO2 96%   BMI 31.71 kg/m²       Intake/Output Summary (Last 24 hours) at 09/24/18 1607  Last data filed at 09/24/18 1243   Gross per 24 hour   Intake              920 ml   Output              180 ml   Net              740 ml     Wt Readings from Last 3 Encounters:   09/24/18 214 lb 11.7 oz (97.4 kg)   08/09/18 209 lb (94.8 kg)   08/02/18 212 lb 9.6 oz (96.4 kg)       Telemetry: SR 70's  NYHA: IV    Physical Exam:  General:  Awake, alert, NAD  Skin:  Warm and dry  Neck:  Line in place  Chest: few scattered wheezes, diminished   Cardiovascular:  RRR, normal S1S2, + 3/6 CAROL, no g/r  Abdomen:  Distended, + bs, tender to light palpation  Extremities:  Trace RLE edema, 1+ LLE edema    Medications:    darbepoetin siddharth-polysorbate        darbepoetin siddharth-polysorbate  40 regional wall motion abnormality at rest or post dobutamine infusion. Normal hyperdynamic response to dobutamine stress. Definity contrast is used. Normal dobutamine stress echocardiogram.    Echo 9/15/18: The left ventricular systolic function is mildly reduced with an ejection fraction of 40 - 45 %. There is global hypokinesis of the left ventricle. Normal left ventricular size with mild concentric left ventricular hypertrophy. Grade II diastolic dysfunction with elevated filling pressure. Changes noted from previous echo on 10-. Mild mitral regurgitation. The left atrium is moderately dilated. The aortic valve is thickened/calcified with decreased leaflet mobility consistent with aortic stenosis. The aortic valve area is calculated at 0.95 cm2 with a maximum pressure gradient of 61 mmHg and a mean pressure gradient of 27 mmHg. This is c/w moderate aortic stenosis. Mild aortic regurgitation. Consider dobutamine stress echo to assess for low flow low gradient aortic stenosis. Echo 1/24/2017:  Summary   Left ventricular systolic function is normal with an ejection fraction of   55-60%. No wall motion abnormalities noted. Mild concentric left ventricular hypertrophy. Grade II diastolic dysfunction with elevated filling pressure. Mildly dilated left atrium. Moderate aortic stenosis. Mild aortic regurgitation. Compared to previous study from 10/27/2016, aortic stenosis has not   progressed. R/LHC 1/23/2017:  LM <20%  LAD 30% w/ patent stent  Cx 20-30%  RCA 20-30%  LVEDP 19  RA 8, RV 41/10, PA 45/14/31, W 15      Nonobstructive CAD  Mild elevated right heart pressures      Echo Oct 2016:   Normal left ventricle size with mild concentric left ventricular hypertrophy.   Normal systolic function with an estimated ejection fraction of 55%.   No regional wall motion abnormalities are seen.   Elevated left ventricular diastolic filling pressure ( E/e' 21 ).    The

## 2018-09-24 NOTE — PROGRESS NOTES
09/23/18 2100   NIV Type   NIV Started Yes   Equipment Type V60   Mode CPAP   Mask Type Full face mask   Mask Size Medium   Settings/Measurements   Comfort Level Good   Using Accessory Muscles No   CPAP 10 cmH2O   Resp 17   FiO2  25 %   Vt Exhaled 619 mL   Mask Leak (lpm) 58 lpm   Skin Protection for O2 Device Yes

## 2018-09-24 NOTE — PROGRESS NOTES
09/24/18 0320   NIV Type   Equipment Type V60   Mode CPAP   Mask Type Full face mask   Mask Size Medium   Settings/Measurements   Comfort Level Good   Using Accessory Muscles No   CPAP 10 cmH2O   Resp 17   SpO2 95   FiO2  25 %   Vt Exhaled 379 mL   Mask Leak (lpm) 33 lpm   Skin Protection for O2 Device Yes   Breath Sounds   Right Upper Lobe Diminished   Right Middle Lobe Diminished   Right Lower Lobe Diminished   Left Upper Lobe Diminished   Left Lower Lobe Diminished   Alarm Settings   Alarms On Y   Press Low Alarm 6 cmH2O   High Pressure Alarm 28 cmH2O   Delay Alarm 20 sec(s)   Resp Rate Low Alarm 6   High Respiratory Rate 40 br/min

## 2018-09-24 NOTE — FLOWSHEET NOTE
09/24/18 1300 09/24/18 1800   Treatment   Time On 1317 --    Time Off --  1757   Treatment Goal 2500 --    Observations & Evaluations   Level of Consciousness 0 0   Pulse 71 86   Heart Rhythm Regular Regular   Resp 16 18   Respiratory Quality/Effort Unlabored Unlabored   O2 Device None (Room air) None (Room air)   Vital Signs   BP (!) 93/51 118/68   Temp 96.2 °F (35.7 °C) 96.1 °F (35.6 °C)   Weight 214 lb 11.7 oz (97.4 kg) 209 lb 7 oz (95 kg)   Weight Method Standing scale Estimated   Percent Weight Change -3.75 -2.46   Treatment Initiation   Dialyze Hours 4 --    Post-Hemodialysis Assessment   Rinseback Volume (ml) --  400 ml   Total Liters Processed (l/min) --  78.7 l/min   Dialyzer Clearance --  Lightly streaked   Duration of Treatment (minutes) --  240 minutes   Heparin amount administered during treatment (units) --  3000 units   Hemodialysis Intake (ml) --  100 ml   Hemodialysis Output (ml) --  3000 ml   NET Removed (ml) --  2500 ml   Tolerated Treatment --  Good   Tolerated 4 hour hd tx without difficulty, net UF 2.5 Liters. No albumin needed for BP support. Recd heparin 1000 unit bolus and 500 units per hour throughout tx. Right IJ TDC runs well and at ordered speed but will be replaced with tunneled line tomorrow. Aranspt 40mcg given as ordered. Report to floor RN and tx record placed in chart for scan into the EMR.

## 2018-09-24 NOTE — PROGRESS NOTES
Per notes pt was to have HD in AM through temporary [facility] and [facility] placed afterwards. No orders in place for [facility] placement or for the need to hold heparin. Heparin was administered with morning medications. Radiology called for report on patient as they had them scheduled for [facility] placement at [address] [facility] explained the situation, pt is NPO at this time. Radiology will call day shift RN with the plans for the day. Will sign off care.

## 2018-09-25 ENCOUNTER — HOSPITAL ENCOUNTER (OUTPATIENT)
Dept: INTERVENTIONAL RADIOLOGY/VASCULAR | Age: 48
Discharge: HOME | DRG: 981 | End: 2018-09-25
Payer: MEDICARE

## 2018-09-25 VITALS
RESPIRATION RATE: 18 BRPM | SYSTOLIC BLOOD PRESSURE: 92 MMHG | WEIGHT: 214.8 LBS | BODY MASS INDEX: 31.81 KG/M2 | HEIGHT: 69 IN | DIASTOLIC BLOOD PRESSURE: 56 MMHG | OXYGEN SATURATION: 94 % | HEART RATE: 67 BPM | TEMPERATURE: 97.8 F

## 2018-09-25 LAB
ALBUMIN SERPL-MCNC: 3.5 G/DL (ref 3.4–5)
ANION GAP SERPL CALCULATED.3IONS-SCNC: 10 MMOL/L (ref 3–16)
BUN BLDV-MCNC: 21 MG/DL (ref 7–20)
CALCIUM SERPL-MCNC: 8.6 MG/DL (ref 8.3–10.6)
CHLORIDE BLD-SCNC: 97 MMOL/L (ref 99–110)
CO2: 26 MMOL/L (ref 21–32)
CREAT SERPL-MCNC: 3.4 MG/DL (ref 0.9–1.3)
GFR AFRICAN AMERICAN: 23
GFR NON-AFRICAN AMERICAN: 19
GLUCOSE BLD-MCNC: 92 MG/DL (ref 70–99)
GLUCOSE BLD-MCNC: 97 MG/DL (ref 70–99)
PERFORMED ON: NORMAL
PHOSPHORUS: 2.9 MG/DL (ref 2.5–4.9)
POTASSIUM SERPL-SCNC: 4.7 MMOL/L (ref 3.5–5.1)
SODIUM BLD-SCNC: 133 MMOL/L (ref 136–145)

## 2018-09-25 PROCEDURE — 99232 SBSQ HOSP IP/OBS MODERATE 35: CPT | Performed by: NURSE PRACTITIONER

## 2018-09-25 PROCEDURE — 6370000000 HC RX 637 (ALT 250 FOR IP): Performed by: NURSE PRACTITIONER

## 2018-09-25 PROCEDURE — 97165 OT EVAL LOW COMPLEX 30 MIN: CPT

## 2018-09-25 PROCEDURE — 94761 N-INVAS EAR/PLS OXIMETRY MLT: CPT

## 2018-09-25 PROCEDURE — 2580000003 HC RX 258: Performed by: INTERNAL MEDICINE

## 2018-09-25 PROCEDURE — 94660 CPAP INITIATION&MGMT: CPT

## 2018-09-25 PROCEDURE — G8988 SELF CARE GOAL STATUS: HCPCS

## 2018-09-25 PROCEDURE — 97161 PT EVAL LOW COMPLEX 20 MIN: CPT

## 2018-09-25 PROCEDURE — 6370000000 HC RX 637 (ALT 250 FOR IP): Performed by: INTERNAL MEDICINE

## 2018-09-25 PROCEDURE — 99152 MOD SED SAME PHYS/QHP 5/>YRS: CPT

## 2018-09-25 PROCEDURE — 77001 FLUOROGUIDE FOR VEIN DEVICE: CPT

## 2018-09-25 PROCEDURE — G8979 MOBILITY GOAL STATUS: HCPCS

## 2018-09-25 PROCEDURE — C1881 DIALYSIS ACCESS SYSTEM: HCPCS

## 2018-09-25 PROCEDURE — 94640 AIRWAY INHALATION TREATMENT: CPT

## 2018-09-25 PROCEDURE — 2700000000 HC OXYGEN THERAPY PER DAY

## 2018-09-25 PROCEDURE — 6370000000 HC RX 637 (ALT 250 FOR IP): Performed by: HOSPITALIST

## 2018-09-25 PROCEDURE — 80069 RENAL FUNCTION PANEL: CPT

## 2018-09-25 PROCEDURE — 97535 SELF CARE MNGMENT TRAINING: CPT

## 2018-09-25 PROCEDURE — 94664 DEMO&/EVAL PT USE INHALER: CPT

## 2018-09-25 PROCEDURE — 6360000002 HC RX W HCPCS: Performed by: INTERNAL MEDICINE

## 2018-09-25 PROCEDURE — 97116 GAIT TRAINING THERAPY: CPT

## 2018-09-25 PROCEDURE — 6360000002 HC RX W HCPCS: Performed by: RADIOLOGY

## 2018-09-25 PROCEDURE — 36558 INSERT TUNNELED CV CATH: CPT

## 2018-09-25 PROCEDURE — G8987 SELF CARE CURRENT STATUS: HCPCS

## 2018-09-25 PROCEDURE — 36415 COLL VENOUS BLD VENIPUNCTURE: CPT

## 2018-09-25 PROCEDURE — G8978 MOBILITY CURRENT STATUS: HCPCS

## 2018-09-25 PROCEDURE — 76937 US GUIDE VASCULAR ACCESS: CPT

## 2018-09-25 RX ORDER — MIDAZOLAM HYDROCHLORIDE 5 MG/ML
INJECTION INTRAMUSCULAR; INTRAVENOUS
Status: COMPLETED | OUTPATIENT
Start: 2018-09-25 | End: 2018-09-25

## 2018-09-25 RX ORDER — FENTANYL CITRATE 50 UG/ML
INJECTION, SOLUTION INTRAMUSCULAR; INTRAVENOUS
Status: COMPLETED | OUTPATIENT
Start: 2018-09-25 | End: 2018-09-25

## 2018-09-25 RX ORDER — CLOPIDOGREL BISULFATE 75 MG/1
75 TABLET ORAL DAILY
Status: DISCONTINUED | OUTPATIENT
Start: 2018-09-25 | End: 2018-09-25 | Stop reason: HOSPADM

## 2018-09-25 RX ORDER — ASPIRIN 81 MG/1
81 TABLET ORAL DAILY
Status: DISCONTINUED | OUTPATIENT
Start: 2018-09-25 | End: 2018-09-25 | Stop reason: HOSPADM

## 2018-09-25 RX ORDER — CARVEDILOL 12.5 MG/1
6.25 TABLET ORAL 2 TIMES DAILY WITH MEALS
Qty: 180 TABLET | Refills: 1 | Status: ON HOLD
Start: 2018-09-25 | End: 2018-11-13 | Stop reason: HOSPADM

## 2018-09-25 RX ORDER — INSULIN GLARGINE 100 [IU]/ML
10 INJECTION, SOLUTION SUBCUTANEOUS NIGHTLY
Qty: 3 VIAL | Refills: 3 | Status: SHIPPED | OUTPATIENT
Start: 2018-09-25 | End: 2019-05-30 | Stop reason: SDUPTHER

## 2018-09-25 RX ADMIN — CITALOPRAM HYDROBROMIDE 20 MG: 20 TABLET ORAL at 10:23

## 2018-09-25 RX ADMIN — FENTANYL CITRATE 50 MCG: 50 INJECTION INTRAMUSCULAR; INTRAVENOUS at 08:39

## 2018-09-25 RX ADMIN — ALBUTEROL SULFATE 2.5 MG: 2.5 SOLUTION RESPIRATORY (INHALATION) at 12:30

## 2018-09-25 RX ADMIN — OXYCODONE HYDROCHLORIDE AND ACETAMINOPHEN 1 TABLET: 7.5; 325 TABLET ORAL at 06:00

## 2018-09-25 RX ADMIN — Medication 2 G: at 08:34

## 2018-09-25 RX ADMIN — CARVEDILOL 6.25 MG: 6.25 TABLET, FILM COATED ORAL at 10:22

## 2018-09-25 RX ADMIN — MIDAZOLAM HYDROCHLORIDE 1 MG: 5 INJECTION, SOLUTION INTRAMUSCULAR; INTRAVENOUS at 08:39

## 2018-09-25 RX ADMIN — CLOPIDOGREL BISULFATE 75 MG: 75 TABLET ORAL at 13:15

## 2018-09-25 RX ADMIN — Medication 10 ML: at 10:25

## 2018-09-25 RX ADMIN — ISOSORBIDE MONONITRATE 30 MG: 30 TABLET, EXTENDED RELEASE ORAL at 10:22

## 2018-09-25 RX ADMIN — ASPIRIN 81 MG: 81 TABLET ORAL at 13:15

## 2018-09-25 RX ADMIN — FENTANYL CITRATE 50 MCG: 50 INJECTION INTRAMUSCULAR; INTRAVENOUS at 08:31

## 2018-09-25 RX ADMIN — OXYCODONE HYDROCHLORIDE AND ACETAMINOPHEN 1 TABLET: 7.5; 325 TABLET ORAL at 10:26

## 2018-09-25 RX ADMIN — MIDAZOLAM HYDROCHLORIDE 1 MG: 5 INJECTION, SOLUTION INTRAMUSCULAR; INTRAVENOUS at 08:37

## 2018-09-25 RX ADMIN — MIDAZOLAM HYDROCHLORIDE 1 MG: 5 INJECTION, SOLUTION INTRAMUSCULAR; INTRAVENOUS at 08:31

## 2018-09-25 RX ADMIN — DILTIAZEM HYDROCHLORIDE 120 MG: 120 CAPSULE, COATED, EXTENDED RELEASE ORAL at 10:23

## 2018-09-25 RX ADMIN — TORSEMIDE 20 MG: 20 TABLET ORAL at 08:40

## 2018-09-25 RX ADMIN — FENTANYL CITRATE 50 MCG: 50 INJECTION INTRAMUSCULAR; INTRAVENOUS at 08:37

## 2018-09-25 ASSESSMENT — PAIN SCALES - GENERAL
PAINLEVEL_OUTOF10: 10
PAINLEVEL_OUTOF10: 6

## 2018-09-25 NOTE — PROGRESS NOTES
Progress Note    HISTORY     CC:  Shortness of breath       Subjective/   HPI:  Had TDC placed. No complications. In the past he says it was painful but seems this one is doing better. He is still very hopeful that he will be able to do PD. No edema    ROS:  Constitutional:  No fevers, No Chills  Cardiovascular:  No palpations, no edema  Respiratory:  No wheezing, no cough  Skin:  No rash, no itching  :  No hematuria, no dysuria     Social Hx:    - No family at bedside     Past Medical and Surgical History:  - Reviewed, no changes     EXAM       Objective/     Vitals:    09/25/18 0845 09/25/18 0849 09/25/18 0853 09/25/18 1220   BP: (!) 97/58 (!) 117/59 108/61 (!) 92/56   Pulse: 76 77 77 67   Resp: 12 27 20 18   Temp:    97.8 °F (36.6 °C)   TempSrc:    Oral   SpO2: 91% 92% 92% 94%   Weight:       Height:         24HR INTAKE/OUTPUT:      Intake/Output Summary (Last 24 hours) at 09/25/18 1515  Last data filed at 09/25/18 1130   Gross per 24 hour   Intake              940 ml   Output             3100 ml   Net            -2160 ml     Constitutional:  Alert, awake, no apparent distress  Eyes:  Pupils reactive, sclera clear   Neck:  Normal thyroid, no masses   Cardiovascular:  Regular, no rub  Respiratory:  No distress, no wheezing  Psychiatry:  Appropriate mood/affect, alert  Abdomen: +bs, soft, nt, no masses   Musculoskeletal: no LE edema, no clubbing   Lymphatics:  No LAD in neck, no supraclavicular nodes       MEDICAL DECISION MAKING       Data/  No results for input(s): WBC, HGB, HCT, MCV, PLT in the last 72 hours. Recent Labs      09/23/18   0432  09/24/18   0737  09/25/18   0742   NA  133*  128*  133*   K  4.3  4.3  4.7   CL  95*  91*  97*   CO2  27  26  26   GLUCOSE  108*  114*  92   PHOS  3.1  3.9  2.9   BUN  24*  39*  21*   CREATININE  3.6*  4.7*  3.4*   LABGLOM  18*  13*  19*   GFRAA  22*  16*  23*       Assessment/     ESRD:  - PD catheter placed, initially did well. Then started to leak. New PD catheter placed on 9/24  - He has a nice working left upper arm AVF. Steal symptoms. Now refusing to let us cannulate it  - Has TDC in place, he is ready for discharge     Hypertension:  Low with dialysis, d/c losartan     Anemia of chronic disease-CKD:  Hb at target     Plan/     It is very unfortunate that he refuses to let us cannulate a perfectly good fistula ( he did not tell us this prior to getting it placed, back then he did nothing but complain about the [facility] [place] and wanted it out )  Lei Sees for discharge  Scheduled at [facility], MWF shift. I will follow him there.   Advised to carefully monitor his fluid intake   -----------------------------  Eloise Lee M.D.   [facility]

## 2018-09-25 NOTE — PROGRESS NOTES
Physical Therapy    Facility/Department: [facility] A2 CARD TELEMETRY  Initial Assessment    NAME: Crystal Diamond  : 1970  MRN: [id number]    Date of Service: 2018    Discharge Recommendations:  Home with assist PRN, Home with Home health PT   PT Equipment Recommendations  Equipment Needed: Yes  Mobility Devices: Ceclia Stall: Rollator (4 Wheeled) (recommend 9SK given pt's decreased endurance and need for frequent seated rest breaks while ambulating)    Patient Diagnosis(es): The primary encounter diagnosis was Acute on chronic congestive heart failure, unspecified heart failure type ([facility] A diagnosis of ESRD on peritoneal dialysis [facility]) was also pertinent to this visit. has a past medical history of Aortic stenosis; Arthritis; Asthma; Bilateral hilar adenopathy syndrome; CAD (coronary artery disease); CHF (congestive heart failure) ([facility]); COPD (chronic obstructive pulmonary disease) ([facility] CRF (chronic renal failure); Depression; Diabetes mellitus ([facility].); Emphysema of lung ([facility] Gastric ulcer; GERD (gastroesophageal reflux disease); Heart valve problem; Hemodialysis patient [facility]); Hyperlipidemia; Hypertension; Neuromuscular disorder ([facility] Numbness and tingling in left arm; Pneumonia; PONV (postoperative nausea and vomiting); Prolonged emergence from general anesthesia; Sleep apnea; Stroke [facility]); TIA (transient ischemic attack); and Unspecified diseases of blood and blood-forming organs. has a past surgical history that includes Tonsillectomy; cyst removal (13); Colonoscopy; Coronary angioplasty with stent (05/26/15); vascular surgery (aprx 2 years ago); Colonoscopy (); Upper gastrointestinal endoscopy (2016); Upper gastrointestinal endoscopy (2017); other surgical history (2017); Dialysis fistula creation (Left, 10/30/2017);  Diagnostic Cardiac Cath Lab Procedure; other surgical history (); other surgical history (Bilateral, 2018); and pr lap insertion tunneled intraperitoneal catheter (N/A, 9/21/2018). Restrictions  Restrictions/Precautions  Restrictions/Precautions: General Precautions  Position Activity Restriction  Other position/activity restrictions: Low fall risk per nursing assessment, up as tolerated, telemetry, dialysis fistula LUE, peritoneal dialysis cathether  Vision/Hearing  Vision: Impaired  Vision Exceptions: Wears glasses for reading  Hearing: Within functional limits     Subjective  General  Chart Reviewed: Yes  Patient assessed for rehabilitation services?: Yes  Additional Pertinent Hx: Hx CVA's with residual L-sided weakness  Family / Caregiver Present: No  Referring Practitioner: Dr. Yung Warren  Referral Date : 09/25/18  Diagnosis: Acute-on-chronic CHF  Follows Commands: Within Functional Limits  General Comment  Comments: Pt sitting EOB upon entry of therapy staff  Subjective  Subjective: Pt agreeable to work with PT/OT this morning, pleasant and cooperative. Says he fatigues very quickly with ambulation and requires frequent seated rest breaks when grocery shopping. \"I feel like I need a walker with a seat because I never know when my legs are going to give out. \"  Pain Screening  Patient Currently in Pain: Denies  Intervention List: Patient able to continue with treatment    Orientation  Orientation  Overall Orientation Status: Within Normal Limits    Social/Functional History  Social/Functional History  Lives With: Spouse  Type of Home: House  Home Layout: One level  Home Access: Ramped entrance  Bathroom Shower/Tub: Walk-in shower  Bathroom Toilet: Standard  Bathroom Equipment: Shower chair  Home Equipment:  (no home equipment)  ADL Assistance: Independent (with increased time)  Homemaking Assistance: Independent  Homemaking Responsibilities: Yes  Meal Prep Responsibility: Secondary (small meals)  Laundry Responsibility: No (daughter)  Cleaning Responsibility: No  Shopping Responsibility: Secondary (with wife)  Ambulation Assistance: ambulation. LE's appear increasingly weak, shaky, and unsteady while ambulating short distances in room with need for frequent seated rest breaks. Recommend pt return home with assist PRN from family and home PT to continue to build strength/endurance in home environment. Pt will also require 4WW with seat given decreased endurance and need for frequent seated rest breaks while amb. Treatment Diagnosis: Decreased LE strength and (I) with mobility  Specific instructions for Next Treatment: Progress ther ex and mobility as marcia, amb with 4WW  Prognosis: Good  Decision Making: Low Complexity  Patient Education: Role of PT, benefits of mobility, safety in transfers/amb with RW, home DME & D/C recs - pt verbalizes understanding  REQUIRES PT FOLLOW UP: Yes  Activity Tolerance: Patient Tolerated treatment well;Patient limited by fatigue;Patient limited by endurance       Plan   Times per week: 3-5x/week in acute care  Times per day: Daily  Specific instructions for Next Treatment: Progress ther ex and mobility as marcia, amb with 9HJ  Current Treatment Recommendations: Strengthening, Balance Training, Endurance Training, Functional Mobility Training, Transfer Training, Gait Training, Equipment Evaluation, Education, & procurement  Safety Devices: All fall risk precautions in place, Left in chair, Call light within reach, Chair alarm in place, Nurse notified, Gait belt, Patient at risk for falls    G-Code  PT G-Codes  Functional Assessment Tool Used: AM-PAC Mobility without Stair Climbing  Score: 18  Functional Limitation: Mobility: Walking and moving around  Mobility: Walking and Moving Around Current Status (): At least 20 percent but less than 40 percent impaired, limited or restricted  Mobility: Walking and Moving Around Goal Status ():  At least 1 percent but less than 20 percent impaired, limited or restricted    AM-PAC Score  AM-PAC Inpatient Mobility without Stair Climbing Raw Score : 18  AM-PAC Inpatient without Stair Climbing T-Scale Score : 51.97  Mobility Inpatient CMS 0-100% Score: 23.26  Mobility Inpatient without Stair CMS G-Code Modifier : CJ    Goals  Short term goals  Time Frame for Short term goals: 1 week (unless otherwise specified)  Short term goal 1: Pt will transfer supine <-> sit with (I)  Short term goal 2: Pt will transfer sit <-> stand and bed>chair using RW/4WW with modified(I)  Short term goal 3: Pt will ambulate x 100 feet using RW/4WW with supervision  Short term goal 4: By 9/27/18: Pt will tolerate 12-15 reps BLE exercise for strengthening, endurance, and balance  Patient Goals   Patient goals :  \"To go home and walk better\"       Therapy Time   Individual Concurrent Group Co-treatment   Time In 1050         Time Out 1117         Minutes 27         Timed Code Treatment Minutes: Mónica Feldman [address], [address], [place] #[id number]

## 2018-09-25 NOTE — DISCHARGE SUMMARY
atorvastatin 40 mg nightly. Cardiology following. 5. Aortic stenosis with bicuspid aortic valve.   Moderate by echo. Follow-up with Cardiology   6. Type 2 Diabetes (uncontrolled, HgbA1c 7.4% 3/19/18):  Controlled. Resume home regimen. 7. Obstructive sleep apnea treated with CPAP from home. 8. Hypertension continued on home meds. 9. Nicotine dependence:  Declined nicotine replacement therapy at this time.          Exam:   BP (!) 92/56   Pulse 67   Temp 97.8 °F (36.6 °C) (Oral)   Resp 18   Ht 5' 9\" (1.753 m)   Wt 214 lb 12.8 oz (97.4 kg)   SpO2 94%   BMI 31.72 kg/m²   General appearance: No apparent distress, appears stated age and cooperative. HEENT: Pupils equal, round, and reactive to light. Conjunctivae/corneas clear. Neck: Supple, no jugular venous distention. Trachea midline with full range of motion. Respiratory:  Normal respiratory effort. Clear to auscultation, bilaterally without Rales/Wheezes/Rhonchi. Cardiovascular: Regular rate and rhythm with normal S1/S2 without murmurs, rubs or gallops. Abdomen: new peritoneal dialysis catheter right side, soft, mildly tender in the area of the catheter, normal bowel sounds  Musculoskelatal: No clubbing, cyanosis or edema bilaterally. Full range of motion without deformity. Neurologic:  Neurovascularly intact without any focal sensory/motor deficits. Cranial nerves: II-XII intact, grossly non-focal.  Psychiatric: Alert and oriented, thought content appropriate, normal insight  Skin: Skin color, texture, turgor normal.  No rashes or lesions.   Capillary Refill: Brisk,< 3 seconds   Peripheral Pulses: +2 palpable, equal bilaterally       Patient Discharge Instructions:   FOLLOW-UP APPOINTMENT  A hospital follow-up appointment is scheduled with Cathyann Riedel CNP (associate of Tristan Bagley MD) for Tuesday, October 2 at 2:00 PM.       Discharge Medications:   Discharge Medication List as of 9/25/2018 12:48 PM      START taking these medications Details   insulin glargine (LANTUS) 100 UNIT/ML injection vial Inject 10 Units into the skin nightly, Disp-3 vial, R-3Print      Insulin Syringe-Needle U-100 30G X 1/2\" 0.5 ML MISC DAILY Starting Tue 9/25/2018, Disp-100 each, R-3, Print           Discharge Medication List as of 9/25/2018 12:48 PM      CONTINUE these medications which have CHANGED    Details   carvedilol (COREG) 12.5 MG tablet Take 0.5 tablets by mouth 2 times daily (with meals), Disp-180 tablet, R-1NO PRINT           Discharge Medication List as of 9/25/2018 12:48 PM      CONTINUE these medications which have NOT CHANGED    Details   oxyCODONE-acetaminophen (PERCOCET) 7.5-325 MG per tablet Take 1 tablet by mouth every 4 hours as needed for Pain. Chetna Mansfield Historical Med      isosorbide mononitrate (IMDUR) 30 MG extended release tablet TAKE 1 TABLET EVERY DAY, Disp-90 tablet, R-1Normal      pravastatin (PRAVACHOL) 80 MG tablet Take 1 tablet by mouth daily, Disp-90 tablet, R-1Normal      Magnesium Hydroxide (MILK OF MAGNESIA PO) Historical Med      Pediatric Multivitamins-Fl (MULTI VIT/FL) 0.25 MG CHEW Historical Med      Polyethylene Glycol 3350 GRAN Starting Wed 5/2/2018, Historical Med      hydrOXYzine (VISTARIL) 50 MG capsule TAKE 1 TO 2 CAPSULES EVERY NIGHT, Disp-180 capsule, R-0Normal      traZODone (DESYREL) 150 MG tablet Take 1 tablet by mouth nightly, Disp-90 tablet, R-1Normal      clopidogrel (PLAVIX) 75 MG tablet TAKE 1 TABLET EVERY DAY, Disp-90 tablet, R-1Normal      !! Glucose Blood (BLOOD GLUCOSE TEST STRIPS) STRP TEST 3-4 TIMES DAILY, AS DIRECTED, Disp-100 strip, R-3Normal      !! ACCU-CHEK FASTCLIX LANCETS MISC Disp-300 each, R-3, NormalTEST 3-4 TIMES DAILY, AS DIRECTED      Blood Glucose Monitoring Suppl ADAM Disp-1 Device, R-0, NormalUSE AS DIRECTED.       Alcohol Swabs PADS Disp-300 each, R-3, NormalUSE AS DIRECTED      torsemide (DEMADEX) 100 MG tablet Take 1 tablet by mouth daily, Disp-30 tablet, R-3NO PRINT      B Complex-C-Folic Acid good blood return. The catheter was sutured to the skin. The venotomy site was closed with skin adhesive. The catheter was locked with the appropriate volume of heparin. The patient tolerated the procedure well and there were no immediate complications. FINDINGS: Initial fluoroscopy image demonstrates a temporary hemodialysis catheter in place. Final fluoroscopy images demonstrate a tunneled hemodialysis catheter in place with tip projecting over the right atrium. Successful fluoroscopy guided conversion of a temporary catheter tunneled catheter. The patient currently has a 23 cm tip to cuff catheter in place via the right internal jugular vein. The catheter is ready for immediate use. Ir Nontunneled Vascular Catheter > 5 Years    Result Date: 9/18/2018  PROCEDURE: ULTRASOUND GUIDED VASCULAR ACCESS. FLUOROSCOPY GUIDED PLACEMENT OF A NON-TUNNELED CATHETER. 9/18/2018. HISTORY: ORDERING SYSTEM PROVIDED HISTORY: for hemodialysis TECHNOLOGIST PROVIDED HISTORY: Reason for exam:->for hemodialysis Ordering Physician Provided Reason for Exam: HD Acuity: Unknown Type of Exam: Unknown Additional signs and symptoms: 16cm, 12.5 Fr triple lumen temporary dialysis catheter, Rt IJ, US/Fluoro guidance, 48 seconds of fluoro Relevant Medical/Surgical History: 16cm, 12.5 Fr triple lumen temporary dialysis catheter, Rt IJ, US/Fluoro guidance, 48 seconds of fluoro SEDATION: None. Local only FLUOROSCOPY DOSE AND TYPE OR TIME AND EXPOSURES: 48 seconds, 2 images, 3 mGy TECHNIQUE: Informed consent was obtained after a detailed explanation of the procedure including risks, benefits, and alternatives. Universal protocol was observed. The right neck and chest were prepped and draped in sterile fashion using maximum sterile barrier technique. Local anesthesia was achieved with lidocaine. A micropuncture needle was used to access the right internal jugular vein using ultrasound guidance.   An ultrasound image demonstrating patency of the vein with needle tip located within it. An image was obtained and stored in PACs. A 0.035 guidewire was used to place a 16 cm temporary hemodialysis catheter using fluoroscopic guidance with tip in the right atrium after fascial tract dilation. The catheter flushed easily and there was a good blood return. The catheter was sutured to the skin. The catheter central lumen was locked with heparinized saline and the dialysis lumens were locked with heparin. The patient tolerated the procedure well and there were no immediate complications. FINDINGS: Fluoroscopic image demonstrates the tip of the catheter in the right atrium. Successful ultrasound and fluoroscopy guided right internal jugular 16 cm temporary hemodialysis catheter placement. Consults:     IP CONSULT TO HOSPITALIST  IP CONSULT TO CARDIOLOGY  IP CONSULT TO NEPHROLOGY  IP CONSULT TO GENERAL SURGERY  IP CONSULT TO CASE MANAGEMENT  IP CONSULT TO DIETITIAN  IP CONSULT TO HOME CARE NEEDS    Labs:  For convenience and continuity at follow-up the following most recent labs are provided:    Lab Results   Component Value Date    WBC 9.3 09/22/2018    HGB 9.0 09/22/2018    HCT 26.9 09/22/2018    MCV 91.0 09/22/2018     09/22/2018     09/25/2018    K 4.7 09/25/2018    K 3.2 09/17/2018    CL 97 09/25/2018    CO2 26 09/25/2018    BUN 21 09/25/2018    CREATININE 3.4 09/25/2018    CALCIUM 8.6 09/25/2018    PHOS 2.9 09/25/2018    BNP 72 09/09/2013    TROPONINI 0.24 09/15/2018    ALKPHOS 65 09/14/2018    ALT 15 09/14/2018    AST 9 09/14/2018    BILITOT 0.5 09/14/2018    BILIDIR <0.2 01/26/2017    LABALBU 3.5 09/25/2018    LDLCALC 55 09/18/2018    TRIG 110 09/18/2018    LABA1C 7.4 03/19/2018     Lab Results   Component Value Date    INR 1.03 09/18/2018    INR 1.04 03/30/2018    INR 0.96 03/22/2018         The patient was seen and examined on day of discharge and this discharge summary is in conjunction with any daily progress note from day

## 2018-09-25 NOTE — PRE SEDATION
catheter (N/A, 9/21/2018). Medications:   Scheduled Meds:   Continuous Infusions:   PRN Meds:   Home Meds:   Prior to Admission medications    Medication Sig Start Date End Date Taking? Authorizing Provider   zolpidem (AMBIEN) 5 MG tablet Take 5 mg by mouth nightly as needed for Sleep. Charisse Simmons Historical Provider, MD   oxyCODONE-acetaminophen (PERCOCET) 7.5-325 MG per tablet Take 1 tablet by mouth every 4 hours as needed for Pain. Charisse Simmons     Historical Provider, MD   isosorbide mononitrate (IMDUR) 30 MG extended release tablet TAKE 1 TABLET EVERY DAY 6/22/18   JAY Gibson - CNP   pravastatin (PRAVACHOL) 80 MG tablet Take 1 tablet by mouth daily 6/19/18   JAY Gibson - CNP   Magnesium Hydroxide (MILK OF MAGNESIA PO)  5/2/18   Historical Provider, MD   Pediatric Multivitamins-Fl (MULTI VIT/FL) 0.25 MG CHEW  5/2/18   Historical Provider, MD   Polyethylene Glycol 3350 GRAN  5/2/18   Historical Provider, MD   hydrOXYzine (VISTARIL) 50 MG capsule TAKE 1 TO 2 CAPSULES EVERY NIGHT 5/16/18   Tristan Bagley MD   carvedilol (COREG) 12.5 MG tablet Take 1 tablet by mouth 2 times daily (with meals) 4/25/18   Tristan Bagley MD   traZODone (DESYREL) 150 MG tablet Take 1 tablet by mouth nightly 4/25/18   Tristan Bagley MD   clopidogrel (PLAVIX) 75 MG tablet TAKE 1 TABLET EVERY DAY 4/25/18   Tristan Bagley MD   Glucose Blood (BLOOD GLUCOSE TEST STRIPS) STRP TEST 3-4 TIMES DAILY, AS DIRECTED 4/25/18   Tristan Bagley MD   ACCU-CHEK FASTCLIX LANCETS MISC TEST 3-4 TIMES DAILY, AS DIRECTED 4/25/18   Tristan Bagley MD   Blood Glucose Monitoring Suppl ADAM USE AS DIRECTED. 4/25/18   Tristan Bagley MD   Alcohol Swabs PADS USE AS DIRECTED 4/25/18   Tristan Bagley MD   atorvastatin (LIPITOR) 40 MG tablet TK 1 T PO D 4/5/18   Historical Provider, MD   diltiazem (CARTIA XT) 180 MG extended release capsule TAKE 1 CAPSULE EVERY DAY  Patient taking differently: nightly TAKE 1 CAPSULE EVERY DAY 4/12/18 Marta Wiggins, APRN - CNP   torsemide [facility]) 100 MG tablet Take 1 tablet by mouth daily 3/25/18   Tejal York MD   B Complex-C-Folic Acid (VIRT-CAPS) 1 MG CAPS TK ONE C PO  QD 1/15/18   Historical Provider, MD   cyclobenzaprine (FLEXERIL) 10 MG tablet TK 1 T PO Q 8 H PRN 1/16/18   Historical Provider, MD   albuterol sulfate  (90 Base) MCG/ACT inhaler Inhale 2 puffs into the lungs every 6 hours as needed for Wheezing 11/8/17   Elias Ponce MD   ipratropium-albuterol (DUONEB) 0.5-2.5 (3) MG/3ML SOLN nebulizer solution Inhale 3 mLs into the lungs every 6 hours as needed for Shortness of Breath 10/15/17   No Patel MD   Insulin Syringes, Disposable, U-100 1 ML MISC 1 each by Does not apply route daily 6/2/17   Ximena Cage MD   glucose blood VI test strips (FREESTYLE LITE) strip Daily As needed. 5/25/17   Ximena Cage MD   glucose monitoring kit (FREESTYLE) monitoring kit 1 kit by Does not apply route daily 5/25/17   Ximena Cage MD   Lancets MISC Test daily 5/25/17   Ximena Cage MD   citalopram (CELEXA) 20 MG tablet TAKE 1 TABLET EVERY DAY 4/19/17   Ximena Cage MD   nitroGLYCERIN (NITROSTAT) 0.4 MG SL tablet Place 1 tablet under the tongue every 5 minutes as needed for Chest pain 5/27/15   Angelika Bonilla MD   calcium carbonate (TUMS) 500 MG chewable tablet Take 1 tablet by mouth 3 times daily as needed for Heartburn. Historical Provider, MD   aspirin 81 MG chewable tablet Take 1 tablet by mouth daily. 5/14/13   Aura Isbell MD     Coumadin Use Last 7 Days:  no  Antiplatelet drug therapy use last 7 days: yes - asa/plavix  Other anticoagulant use last 7 days: yes - subq heparin  Additional Medication Information:  n/a      Pre-Sedation Documentation and Exam:   I have reviewed the patient's history and review of systems.     Mallampati Airway Assessment:  Mallampati Class II - (soft palate, fauces & uvula are visible)    Prior History of Anesthesia

## 2018-09-25 NOTE — PROGRESS NOTES
Low fall risk per nursing assessment, up as tolerated, telemetry, dialysis fistula LUE, peritoneal dialysis cathether    Subjective   General  Chart Reviewed: Yes  Patient assessed for rehabilitation services?: Yes  Family / Caregiver Present: No  Referring Practitioner: Cari Sosa MD 9/25/18  Diagnosis: Acute-on-chronic CHF  Subjective  Subjective: Pt. pleasant and agreeable to OT evaluation, interested in a toilet raiser, educated on different types and costs. Patient hoping to have insurance pay for it.   Pain Assessment  Patient Currently in Pain: Denies    Oxygen Therapy  SpO2: 94 %  O2 Device: None (Room air)  Social/Functional History  Social/Functional History  Lives With: Spouse  Type of Home: House  Home Layout: One level  Home Access: Ramped entrance  Bathroom Shower/Tub: Walk-in shower  Bathroom Toilet: Standard  Bathroom Equipment: Shower chair  Home Equipment:  (no home equipment)  ADL Assistance: Independent (with increased time)  Homemaking Assistance: Independent  Homemaking Responsibilities: Yes  Meal Prep Responsibility: Secondary (small meals)  Laundry Responsibility: No (daughter)  Cleaning Responsibility: No  Shopping Responsibility: Secondary (with wife)  Ambulation Assistance: Independent (without device)  Transfer Assistance: Independent  Active : Yes  Occupation: On disability  Type of occupation:   Leisure & Hobbies: watch tv       Objective   Vision: Impaired  Vision Exceptions: Wears glasses for reading  Hearing: Within functional limits    Orientation  Overall Orientation Status: Within Functional Limits  Observation/Palpation  Posture: Good     Balance  Sitting Balance: Supervision  Standing Balance: Contact guard assistance (with LE clothing mgmt, unsteady initially \"My leg feels like its asleep\")  Standing Balance  Sit to stand: Contact guard assistance (up to RW)  Stand to sit: Contact guard assistance    Functional Mobility  Functional - Mobility Device: Rolling

## 2018-09-25 NOTE — PLAN OF CARE
Problem: OXYGENATION/RESPIRATORY FUNCTION  Goal: Patient will maintain patent airway  Patient's EF (Ejection Fraction) is less than 40%    Patient has a past medical history of Aortic stenosis; Arthritis; Asthma; Bilateral hilar adenopathy syndrome; CAD (coronary artery disease); CHF (congestive heart failure) ([facility]); COPD (chronic obstructive pulmonary disease) ([facility] CRF (chronic renal failure); Depression; Diabetes mellitus ([facility] Emphysema of lung ([facility] Gastric ulcer; GERD (gastroesophageal reflux disease); Heart valve problem; Hemodialysis patient [facility]); Hyperlipidemia; Hypertension; Neuromuscular disorder ([facility] Numbness and tingling in left arm; Pneumonia; PONV (postoperative nausea and vomiting); Prolonged emergence from general anesthesia; Sleep apnea; Stroke [facility]); TIA (transient ischemic attack); and Unspecified diseases of blood and blood-forming organs. Comorbidities reviewed and education provided. Patient and family's stated goal of care: be more comfortable prior to discharge    Patient's current functional capacity:  Slight limitation of physical activity. Comfortable at rest. Ordinary physical activity results in fatigue, palpitation, dyspnea. Pt resting in bed at this time on room air. Pt denies shortness of breath. Pt with nonpitting lower extremity edema. Patient's weights and intake/output reviewed:    Patient Vitals for the past 96 hrs (Last 3 readings):   Weight   09/24/18 1800 209 lb 7 oz (95 kg)   09/24/18 1300 214 lb 11.7 oz (97.4 kg)   09/24/18 0609 223 lb 1.7 oz (101.2 kg)       Intake/Output Summary (Last 24 hours) at 09/25/18 0031  Last data filed at 09/24/18 2039   Gross per 24 hour   Intake              900 ml   Output             3150 ml   Net            -2250 ml       Patient provided with education on CHF signs/symptoms, causes, discharge medications, daily weights, low sodium diet, activity, and follow-up.  Notified patient to call the doctor post discharge if patient

## 2018-09-25 NOTE — SEDATION DOCUMENTATION
14.5 Armenian 23 cm tunneled dialysis catheter placed over wire from temporary dialysis catheter. Patient tolerated procedure well. [facility] Report called to [address] [place] [address]. Returned to A2 per bed.

## 2018-09-25 NOTE — PROGRESS NOTES
09/24/18 2309   NIV Type   $NIV $Daily Charge   Equipment Type v60   Mode CPAP   Mask Type Full face mask   Mask Size Medium   Settings/Measurements   Comfort Level Good   Using Accessory Muscles No   CPAP 10 cmH2O   Resp 18   SpO2 96   FiO2  25 %   Vt Exhaled 410 mL   Mask Leak (lpm) 28 lpm   Skin Protection for O2 Device Yes   Alarm Settings   Alarms On Y   Press Low Alarm 6 cmH2O   High Pressure Alarm 28 cmH2O   Resp Rate Low Alarm 6   High Respiratory Rate 40 br/min

## 2018-09-25 NOTE — HOME CARE
Jared Ortiz will require the following home care treatments or therapies: Vital signs, medication compliance and education, PT/OT, wound care, teaching and management of medical conditions, etc.  Home care will be necessary because of Deconditioning, ESRD, CHF. The patient is in agreement to receiving home care.

## 2018-09-25 NOTE — PROGRESS NOTES
Jose Angel Simon was evaluated today and a DME order was entered for a wheeled walker with seat because he requires this to successfully complete daily living tasks of toileting, personal cares and ambulating. A wheeled walker with seat is necessary due to the patient's unsteady gait, upper body weakness, inability to  and ambulation device, ambulating only short distances by pushing a walker, and the need to sit for a short time before resuming ambulation. These tasks cannot be completed with a lesser ambulation device such as a cane, crutch, or standard walker. The need for this equipment was discussed with the patient and he understands and is in agreement.

## 2018-09-26 ENCOUNTER — TELEPHONE (OUTPATIENT)
Dept: CARDIAC REHAB | Age: 48
End: 2018-09-26

## 2018-09-26 LAB
EKG ATRIAL RATE: 88 BPM
EKG DIAGNOSIS: NORMAL
EKG P AXIS: 66 DEGREES
EKG P-R INTERVAL: 152 MS
EKG Q-T INTERVAL: 392 MS
EKG QRS DURATION: 94 MS
EKG QTC CALCULATION (BAZETT): 474 MS
EKG R AXIS: -3 DEGREES
EKG T AXIS: 47 DEGREES
EKG VENTRICULAR RATE: 88 BPM

## 2018-10-02 ENCOUNTER — TELEPHONE (OUTPATIENT)
Dept: FAMILY MEDICINE CLINIC | Age: 48
End: 2018-10-02

## 2018-10-02 NOTE — TELEPHONE ENCOUNTER
Aden Milan is requesting a verbal for OT 1x1w then 2x3w. She is also requesting orders for a HHA  2x4w to assist with personal care.

## 2018-10-04 ENCOUNTER — TELEPHONE (OUTPATIENT)
Dept: CARDIOLOGY CLINIC | Age: 48
End: 2018-10-04

## 2018-10-04 ENCOUNTER — OFFICE VISIT (OUTPATIENT)
Dept: SURGERY | Age: 48
End: 2018-10-04

## 2018-10-04 VITALS
SYSTOLIC BLOOD PRESSURE: 126 MMHG | DIASTOLIC BLOOD PRESSURE: 62 MMHG | WEIGHT: 210.4 LBS | HEIGHT: 68 IN | HEART RATE: 83 BPM | BODY MASS INDEX: 31.89 KG/M2

## 2018-10-04 DIAGNOSIS — N18.5 CHRONIC RENAL FAILURE, STAGE 5 (HCC): Primary | ICD-10-CM

## 2018-10-04 PROCEDURE — 99024 POSTOP FOLLOW-UP VISIT: CPT | Performed by: SURGERY

## 2018-10-04 NOTE — PROGRESS NOTES
HPI: Nursing notes reviewed. Patient reports drainage around catheter stopped several days ago. Pain improving. No fevers or chills. ROS:  10 point review of systems performed with pertinent positives in HPI    Phys:   Abd - soft, appropriately tender, nondistended, PD catheter secure   Incisions - no erythema, no drainage    Assesment: 45 yo s/p lap PD catheter placement    Plan: 1. Drainage around PD catheter likely related to generalized fluid overload. Had ascites at time of placement. 2.  Continue with HD for another two weeks. If drainage around PD catheter remains stopped over the next two weeks then could start using then.

## 2018-10-05 ENCOUNTER — TELEPHONE (OUTPATIENT)
Dept: FAMILY MEDICINE CLINIC | Age: 48
End: 2018-10-05

## 2018-10-08 ENCOUNTER — OFFICE VISIT (OUTPATIENT)
Dept: FAMILY MEDICINE CLINIC | Age: 48
End: 2018-10-08
Payer: MEDICARE

## 2018-10-08 VITALS
BODY MASS INDEX: 32.26 KG/M2 | SYSTOLIC BLOOD PRESSURE: 134 MMHG | DIASTOLIC BLOOD PRESSURE: 82 MMHG | WEIGHT: 212.2 LBS | OXYGEN SATURATION: 98 % | HEART RATE: 75 BPM

## 2018-10-08 DIAGNOSIS — Z12.11 COLON CANCER SCREENING: ICD-10-CM

## 2018-10-08 DIAGNOSIS — F32.A DEPRESSION, UNSPECIFIED DEPRESSION TYPE: ICD-10-CM

## 2018-10-08 DIAGNOSIS — I50.41 ACUTE COMBINED SYSTOLIC AND DIASTOLIC CHF, NYHA CLASS 4 (HCC): Primary | ICD-10-CM

## 2018-10-08 DIAGNOSIS — G62.9 NEUROPATHY: ICD-10-CM

## 2018-10-08 PROCEDURE — 1111F DSCHRG MED/CURRENT MED MERGE: CPT | Performed by: NURSE PRACTITIONER

## 2018-10-08 PROCEDURE — G0444 DEPRESSION SCREEN ANNUAL: HCPCS | Performed by: NURSE PRACTITIONER

## 2018-10-08 PROCEDURE — 99495 TRANSJ CARE MGMT MOD F2F 14D: CPT | Performed by: NURSE PRACTITIONER

## 2018-10-08 RX ORDER — GABAPENTIN 100 MG/1
100 CAPSULE ORAL 3 TIMES DAILY
Qty: 270 CAPSULE | Refills: 0 | Status: SHIPPED | OUTPATIENT
Start: 2018-10-08 | End: 2018-11-19 | Stop reason: SDUPTHER

## 2018-10-08 RX ORDER — CITALOPRAM 40 MG/1
TABLET ORAL
Qty: 90 TABLET | Refills: 1 | Status: SHIPPED | OUTPATIENT
Start: 2018-10-08 | End: 2019-05-30 | Stop reason: SDUPTHER

## 2018-10-08 ASSESSMENT — PATIENT HEALTH QUESTIONNAIRE - PHQ9
9. THOUGHTS THAT YOU WOULD BE BETTER OFF DEAD, OR OF HURTING YOURSELF: 0
2. FEELING DOWN, DEPRESSED OR HOPELESS: 3
3. TROUBLE FALLING OR STAYING ASLEEP: 3
SUM OF ALL RESPONSES TO PHQ QUESTIONS 1-9: 24
10. IF YOU CHECKED OFF ANY PROBLEMS, HOW DIFFICULT HAVE THESE PROBLEMS MADE IT FOR YOU TO DO YOUR WORK, TAKE CARE OF THINGS AT HOME, OR GET ALONG WITH OTHER PEOPLE: 3
SUM OF ALL RESPONSES TO PHQ QUESTIONS 1-9: 24
5. POOR APPETITE OR OVEREATING: 3
4. FEELING TIRED OR HAVING LITTLE ENERGY: 3
6. FEELING BAD ABOUT YOURSELF - OR THAT YOU ARE A FAILURE OR HAVE LET YOURSELF OR YOUR FAMILY DOWN: 3
7. TROUBLE CONCENTRATING ON THINGS, SUCH AS READING THE NEWSPAPER OR WATCHING TELEVISION: 3
SUM OF ALL RESPONSES TO PHQ9 QUESTIONS 1 & 2: 6
1. LITTLE INTEREST OR PLEASURE IN DOING THINGS: 3
8. MOVING OR SPEAKING SO SLOWLY THAT OTHER PEOPLE COULD HAVE NOTICED. OR THE OPPOSITE, BEING SO FIGETY OR RESTLESS THAT YOU HAVE BEEN MOVING AROUND A LOT MORE THAN USUAL: 3

## 2018-10-08 ASSESSMENT — ENCOUNTER SYMPTOMS
COUGH: 0
SHORTNESS OF BREATH: 1

## 2018-10-08 NOTE — PROGRESS NOTES
Post-Discharge Transitional Care Management Services or Hospital Follow Up      John Collier   YOB: 1970    Date of Office Visit:  10/8/2018  Date of Hospital Admission: 9/14/18  Date of Hospital Discharge: 9/25/18  Readmission Risk Score(high >=14%. Medium >=10%):Readmission Risk Score: 47    Care management risk score Rising risk (score 2-5) and Complex Care (Scores >=6): 8     Non face to face  following discharge, date last encounter closed (first attempt may have been earlier): *No documented post hospital discharge outreach found in the last 14 days *No documented post hospital discharge outreach found in the last 14 days    Call initiated 2 business days of discharge: *No response recorded in the last 14 days     Patient Active Problem List   Diagnosis    Acute respiratory failure with hypoxia and hypercapnia (HCC)    Chronic dCHF (grade 2 LVDD)    Chronic obstructive pulmonary disease ([address]    PVD (peripheral vascular disease) ([address]    Cardiomyopathy ([address]    Sleep apnea    Bicuspid aortic valve    Bilateral hilar adenopathy syndrome    Claudication in peripheral vascular disease ([address]    PVD (peripheral vascular disease) ([address]    Essential hypertension    Diabetic neuropathy ([address]    Type 2 diabetes, uncontrolled, with neuropathy ([address]    Passive smoke exposure    Depression with anxiety    Pneumonia of right upper lobe due to infectious organism ([address]    DM (diabetes mellitus) ([address]    Hypertensive heart disease with congestive heart failure with preserved left ventricular function ([address]    CHF exacerbation ([address]    Chest pain    Coronary artery disease involving native coronary artery of native heart with angina pectoris ([address]    Obesity (BMI 30-39. 9)    ZAINAB on CPAP    Degeneration of lumbar or lumbosacral intervertebral disc    Lumbar radiculopathy    Lumbosacral spondylosis without myelopathy    Biliary colic    Symptomatic cholelithiasis    Complex-C-Folic Acid (VIRT-CAPS) 1 MG CAPS  TK ONE C PO  QD             Blood Glucose Monitoring Suppl ADAM  USE AS DIRECTED.             calcium carbonate (TUMS) 500 MG chewable tablet  Take 1 tablet by mouth 3 times daily as needed for Heartburn. carvedilol (COREG) 12.5 MG tablet  Take 0.5 tablets by mouth 2 times daily (with meals)             citalopram (CELEXA) 40 MG tablet  TAKE 1 TABLET EVERY DAY             clopidogrel (PLAVIX) 75 MG tablet  TAKE 1 TABLET EVERY DAY             cyclobenzaprine (FLEXERIL) 10 MG tablet  TK 1 T PO Q 8 H PRN             gabapentin (NEURONTIN) 100 MG capsule  Take 1 capsule by mouth 3 times daily for 90 days. .             Glucose Blood (BLOOD GLUCOSE TEST STRIPS) STRP  TEST 3-4 TIMES DAILY, AS DIRECTED             glucose blood VI test strips (FREESTYLE LITE) strip  Daily As needed. glucose monitoring kit (FREESTYLE) monitoring kit  1 kit by Does not apply route daily             hydrOXYzine (VISTARIL) 50 MG capsule  TAKE 1 TO 2 CAPSULES EVERY NIGHT             insulin glargine (LANTUS) 100 UNIT/ML injection vial  Inject 10 Units into the skin nightly             Insulin Syringe-Needle U-100 30G X 1/2\" 0.5 ML MISC  1 each by Does not apply route daily             ipratropium-albuterol (DUONEB) 0.5-2.5 (3) MG/3ML SOLN nebulizer solution  Inhale 3 mLs into the lungs every 6 hours as needed for Shortness of Breath             isosorbide mononitrate (IMDUR) 30 MG extended release tablet  TAKE 1 TABLET EVERY DAY             Lancets MISC  Test daily             Magnesium Hydroxide (MILK OF MAGNESIA PO)               nitroGLYCERIN (NITROSTAT) 0.4 MG SL tablet  Place 1 tablet under the tongue every 5 minutes as needed for Chest pain             oxyCODONE-acetaminophen (PERCOCET) 7.5-325 MG per tablet  Take 1 tablet by mouth every 4 hours as needed for Pain. .             Pediatric Multivitamins-Fl (MULTI VIT/FL) 0.25 MG CHEW               Polyethylene Glycol 3350

## 2018-10-19 ENCOUNTER — TELEPHONE (OUTPATIENT)
Dept: FAMILY MEDICINE CLINIC | Age: 48
End: 2018-10-19
Payer: MEDICARE

## 2018-10-19 DIAGNOSIS — I13.2 HYPERTENSIVE HEART AND CHRONIC KIDNEY DISEASE WITH HEART FAILURE AND WITH STAGE 5 CHRONIC KIDNEY DISEASE, OR END STAGE RENAL DISEASE (HCC): Primary | ICD-10-CM

## 2018-10-19 PROCEDURE — G0180 MD CERTIFICATION HHA PATIENT: HCPCS | Performed by: FAMILY MEDICINE

## 2018-10-23 ENCOUNTER — TELEPHONE (OUTPATIENT)
Dept: FAMILY MEDICINE CLINIC | Age: 48
End: 2018-10-23

## 2018-10-23 NOTE — TELEPHONE ENCOUNTER
Deandre Unger is calling to report a fall for the patient. Pt was dizzy yesterday and fell getting out of bed. He hurt his L knee, stated it was a little sore yesterday. Today it is a little better, no redness, no open sores. Patient is still having dizziness and lightheaded today. No SOB. Per Celestino Mijares advised the patient he should go to the ER for evaluation. Patient declined and said he will just wait for his appt on Nov 5th.

## 2018-10-24 ENCOUNTER — TELEPHONE (OUTPATIENT)
Dept: FAMILY MEDICINE CLINIC | Age: 48
End: 2018-10-24

## 2018-10-25 ENCOUNTER — OFFICE VISIT (OUTPATIENT)
Dept: SURGERY | Age: 48
End: 2018-10-25

## 2018-10-25 ENCOUNTER — TELEPHONE (OUTPATIENT)
Dept: SURGERY | Age: 48
End: 2018-10-25

## 2018-10-25 VITALS
BODY MASS INDEX: 30.92 KG/M2 | SYSTOLIC BLOOD PRESSURE: 119 MMHG | HEIGHT: 68 IN | WEIGHT: 204 LBS | DIASTOLIC BLOOD PRESSURE: 71 MMHG | HEART RATE: 80 BPM

## 2018-10-25 DIAGNOSIS — N18.5 CHRONIC RENAL FAILURE, STAGE 5 (HCC): Primary | ICD-10-CM

## 2018-10-25 PROCEDURE — 99024 POSTOP FOLLOW-UP VISIT: CPT | Performed by: SURGERY

## 2018-10-25 NOTE — TELEPHONE ENCOUNTER
Patient was seen at Dr. Chani Meneses' office yesterday and BP was good, do you still want to get him into the office before 11/5/18.

## 2018-11-02 ENCOUNTER — TELEPHONE (OUTPATIENT)
Dept: FAMILY MEDICINE CLINIC | Age: 48
End: 2018-11-02

## 2018-11-05 ENCOUNTER — OFFICE VISIT (OUTPATIENT)
Dept: FAMILY MEDICINE CLINIC | Age: 48
End: 2018-11-05
Payer: MEDICARE

## 2018-11-05 VITALS
DIASTOLIC BLOOD PRESSURE: 88 MMHG | WEIGHT: 209 LBS | OXYGEN SATURATION: 97 % | BODY MASS INDEX: 31.78 KG/M2 | SYSTOLIC BLOOD PRESSURE: 142 MMHG | HEART RATE: 78 BPM

## 2018-11-05 DIAGNOSIS — I50.9 CONGESTIVE HEART FAILURE, UNSPECIFIED HF CHRONICITY, UNSPECIFIED HEART FAILURE TYPE (HCC): ICD-10-CM

## 2018-11-05 DIAGNOSIS — J44.9 CHRONIC OBSTRUCTIVE PULMONARY DISEASE, UNSPECIFIED COPD TYPE (HCC): Primary | Chronic | ICD-10-CM

## 2018-11-05 PROCEDURE — 3017F COLORECTAL CA SCREEN DOC REV: CPT | Performed by: NURSE PRACTITIONER

## 2018-11-05 PROCEDURE — G8598 ASA/ANTIPLAT THER USED: HCPCS | Performed by: NURSE PRACTITIONER

## 2018-11-05 PROCEDURE — 4004F PT TOBACCO SCREEN RCVD TLK: CPT | Performed by: NURSE PRACTITIONER

## 2018-11-05 PROCEDURE — 94640 AIRWAY INHALATION TREATMENT: CPT | Performed by: NURSE PRACTITIONER

## 2018-11-05 PROCEDURE — 3023F SPIROM DOC REV: CPT | Performed by: NURSE PRACTITIONER

## 2018-11-05 PROCEDURE — G8926 SPIRO NO PERF OR DOC: HCPCS | Performed by: NURSE PRACTITIONER

## 2018-11-05 PROCEDURE — G8417 CALC BMI ABV UP PARAM F/U: HCPCS | Performed by: NURSE PRACTITIONER

## 2018-11-05 PROCEDURE — G8482 FLU IMMUNIZE ORDER/ADMIN: HCPCS | Performed by: NURSE PRACTITIONER

## 2018-11-05 PROCEDURE — G8427 DOCREV CUR MEDS BY ELIG CLIN: HCPCS | Performed by: NURSE PRACTITIONER

## 2018-11-05 PROCEDURE — 99214 OFFICE O/P EST MOD 30 MIN: CPT | Performed by: NURSE PRACTITIONER

## 2018-11-05 RX ORDER — IPRATROPIUM BROMIDE AND ALBUTEROL SULFATE 2.5; .5 MG/3ML; MG/3ML
1 SOLUTION RESPIRATORY (INHALATION) ONCE
Status: DISCONTINUED | OUTPATIENT
Start: 2018-11-05 | End: 2018-11-09

## 2018-11-05 RX ORDER — IPRATROPIUM BROMIDE AND ALBUTEROL SULFATE 2.5; .5 MG/3ML; MG/3ML
1 SOLUTION RESPIRATORY (INHALATION) ONCE
Status: COMPLETED | OUTPATIENT
Start: 2018-11-05 | End: 2018-11-05

## 2018-11-05 RX ORDER — METHYLPREDNISOLONE 4 MG/1
TABLET ORAL
Qty: 1 KIT | Refills: 0 | Status: ON HOLD | OUTPATIENT
Start: 2018-11-05 | End: 2018-11-13 | Stop reason: HOSPADM

## 2018-11-05 RX ORDER — DOXYCYCLINE HYCLATE 100 MG/1
100 CAPSULE ORAL 2 TIMES DAILY
Qty: 14 CAPSULE | Refills: 0 | Status: ON HOLD | OUTPATIENT
Start: 2018-11-05 | End: 2018-11-13 | Stop reason: HOSPADM

## 2018-11-05 RX ADMIN — IPRATROPIUM BROMIDE AND ALBUTEROL SULFATE 1 AMPULE: 2.5; .5 SOLUTION RESPIRATORY (INHALATION) at 10:13

## 2018-11-05 ASSESSMENT — ENCOUNTER SYMPTOMS
SHORTNESS OF BREATH: 1
CONSTIPATION: 1
RHINORRHEA: 0
SPUTUM PRODUCTION: 1
DIARRHEA: 0
COUGH: 1
CHEST TIGHTNESS: 1
WHEEZING: 1
ABDOMINAL PAIN: 1
BACK PAIN: 1
EYES NEGATIVE: 1
NAUSEA: 0

## 2018-11-05 ASSESSMENT — PATIENT HEALTH QUESTIONNAIRE - PHQ9
SUM OF ALL RESPONSES TO PHQ QUESTIONS 1-9: 0
2. FEELING DOWN, DEPRESSED OR HOPELESS: 0
SUM OF ALL RESPONSES TO PHQ QUESTIONS 1-9: 0
SUM OF ALL RESPONSES TO PHQ9 QUESTIONS 1 & 2: 0
1. LITTLE INTEREST OR PLEASURE IN DOING THINGS: 0

## 2018-11-05 NOTE — PROGRESS NOTES
2018     Candelaria Beyer (:  1970) is a 46 y.o. male, here for evaluation of the following medical concerns:  He is short of breath with talking; worsened over the weekend. He saw his home health on Friday, she didn't think he was severe enough to go. He is Stage 5 renal failure, does dialysis at home. Needs new CPAP machine- stopped working about a month ago. He says it too expensive to see the pulmonologist.   He sees nephrologist, pulmonologist, and cardiologist.   He is not taking diabetic medication daily. Non-compliant. Congestive Heart Failure   Presents for follow-up visit. Associated symptoms include abdominal pain, chest pressure, edema (at socks), fatigue, paroxysmal nocturnal dyspnea, shortness of breath and unexpected weight change. Pertinent negatives include no chest pain (tightness). The symptoms have been worsening. The pain is at a severity of 8/10. The pain is severe. The pain is present in the epigastric region and substernal region. The quality of the pain is described as pressure. The pain does not radiate. Chest pain occurs with exertion and at rest.   Cough   This is a recurrent problem. The current episode started in the past 7 days. The problem has been gradually worsening. The problem occurs every few minutes. The cough is productive of sputum (white). Associated symptoms include chills, a fever (102 yesterday), headaches (different headache yesterday- top of head), shortness of breath and wheezing. Pertinent negatives include no chest pain (tightness), myalgias, rhinorrhea or sweats. The symptoms are aggravated by lying down (movement). Risk factors for lung disease include smoking/tobacco exposure. He has tried body position changes (prescribed medications- using nebulizer every 2-3 hours at home) for the symptoms. The treatment provided mild relief. His past medical history is significant for COPD and emphysema.    Shortness of Breath   This is a recurrent

## 2018-11-06 ENCOUNTER — HOSPITAL ENCOUNTER (OUTPATIENT)
Dept: GENERAL RADIOLOGY | Age: 48
Discharge: HOME | DRG: 291 | End: 2018-11-06
Payer: MEDICARE

## 2018-11-06 DIAGNOSIS — I50.9 CONGESTIVE HEART FAILURE, UNSPECIFIED HF CHRONICITY, UNSPECIFIED HEART FAILURE TYPE (HCC): ICD-10-CM

## 2018-11-06 PROCEDURE — 71046 X-RAY EXAM CHEST 2 VIEWS: CPT

## 2018-11-08 ENCOUNTER — TELEPHONE (OUTPATIENT)
Dept: FAMILY MEDICINE CLINIC | Age: 48
End: 2018-11-08

## 2018-11-09 ENCOUNTER — HOSPITAL ENCOUNTER (INPATIENT)
Age: 48
LOS: 4 days | Discharge: HOME HEALTH | DRG: 291 | End: 2018-11-13
Attending: EMERGENCY MEDICINE | Admitting: INTERNAL MEDICINE
Payer: MEDICARE

## 2018-11-09 ENCOUNTER — APPOINTMENT (OUTPATIENT)
Dept: GENERAL RADIOLOGY | Age: 48
DRG: 291 | End: 2018-11-09
Payer: MEDICARE

## 2018-11-09 DIAGNOSIS — J44.9 CHRONIC OBSTRUCTIVE PULMONARY DISEASE, UNSPECIFIED COPD TYPE (HCC): Chronic | ICD-10-CM

## 2018-11-09 DIAGNOSIS — R06.03 RESPIRATORY DISTRESS: ICD-10-CM

## 2018-11-09 DIAGNOSIS — I50.9 ACUTE ON CHRONIC CONGESTIVE HEART FAILURE, UNSPECIFIED HEART FAILURE TYPE (HCC): Primary | ICD-10-CM

## 2018-11-09 DIAGNOSIS — R06.09 DYSPNEA ON EXERTION: ICD-10-CM

## 2018-11-09 PROBLEM — J96.00 ACUTE RESPIRATORY FAILURE (HCC): Status: ACTIVE | Noted: 2018-11-09

## 2018-11-09 PROBLEM — E87.70 FLUID OVERLOAD: Status: ACTIVE | Noted: 2018-11-09

## 2018-11-09 PROBLEM — Z99.2 PERITONEAL DIALYSIS STATUS (HCC): Status: ACTIVE | Noted: 2018-11-09

## 2018-11-09 PROBLEM — J81.1 PULMONARY EDEMA: Status: ACTIVE | Noted: 2018-11-09

## 2018-11-09 LAB
A/G RATIO: 1.3 (ref 1.1–2.2)
ALBUMIN SERPL-MCNC: 3.8 G/DL (ref 3.4–5)
ALP BLD-CCNC: 60 U/L (ref 40–129)
ALT SERPL-CCNC: 29 U/L (ref 10–40)
ANION GAP SERPL CALCULATED.3IONS-SCNC: 15 MMOL/L (ref 3–16)
AST SERPL-CCNC: 16 U/L (ref 15–37)
BASOPHILS ABSOLUTE: 0 K/UL (ref 0–0.2)
BASOPHILS RELATIVE PERCENT: 0.3 %
BILIRUB SERPL-MCNC: 0.3 MG/DL (ref 0–1)
BUN BLDV-MCNC: 78 MG/DL (ref 7–20)
CALCIUM SERPL-MCNC: 9.3 MG/DL (ref 8.3–10.6)
CHLORIDE BLD-SCNC: 94 MMOL/L (ref 99–110)
CO2: 28 MMOL/L (ref 21–32)
CREAT SERPL-MCNC: 4.6 MG/DL (ref 0.9–1.3)
EKG ATRIAL RATE: 69 BPM
EKG ATRIAL RATE: 79 BPM
EKG DIAGNOSIS: NORMAL
EKG DIAGNOSIS: NORMAL
EKG P AXIS: -29 DEGREES
EKG P AXIS: 82 DEGREES
EKG P-R INTERVAL: 148 MS
EKG P-R INTERVAL: 174 MS
EKG Q-T INTERVAL: 384 MS
EKG Q-T INTERVAL: 464 MS
EKG QRS DURATION: 112 MS
EKG QRS DURATION: 98 MS
EKG QTC CALCULATION (BAZETT): 441 MS
EKG QTC CALCULATION (BAZETT): 497 MS
EKG R AXIS: 30 DEGREES
EKG R AXIS: 95 DEGREES
EKG T AXIS: 36 DEGREES
EKG T AXIS: 42 DEGREES
EKG VENTRICULAR RATE: 69 BPM
EKG VENTRICULAR RATE: 79 BPM
EOSINOPHILS ABSOLUTE: 0.1 K/UL (ref 0–0.6)
EOSINOPHILS RELATIVE PERCENT: 0.4 %
GFR AFRICAN AMERICAN: 16
GFR NON-AFRICAN AMERICAN: 14
GLOBULIN: 2.9 G/DL
GLUCOSE BLD-MCNC: 303 MG/DL (ref 70–99)
GLUCOSE BLD-MCNC: 418 MG/DL (ref 70–99)
GLUCOSE BLD-MCNC: 499 MG/DL (ref 70–99)
GLUCOSE BLD-MCNC: 506 MG/DL (ref 70–99)
GLUCOSE BLD-MCNC: 559 MG/DL (ref 70–99)
HCT VFR BLD CALC: 36.1 % (ref 40.5–52.5)
HEMOGLOBIN: 12.1 G/DL (ref 13.5–17.5)
LACTIC ACID: 1 MMOL/L (ref 0.4–2)
LYMPHOCYTES ABSOLUTE: 0.8 K/UL (ref 1–5.1)
LYMPHOCYTES RELATIVE PERCENT: 5.3 %
MAGNESIUM: 1.8 MG/DL (ref 1.8–2.4)
MAGNESIUM: 1.9 MG/DL (ref 1.8–2.4)
MCH RBC QN AUTO: 30 PG (ref 26–34)
MCHC RBC AUTO-ENTMCNC: 33.6 G/DL (ref 31–36)
MCV RBC AUTO: 89.4 FL (ref 80–100)
MONOCYTES ABSOLUTE: 0.7 K/UL (ref 0–1.3)
MONOCYTES RELATIVE PERCENT: 4.8 %
NEUTROPHILS ABSOLUTE: 13.9 K/UL (ref 1.7–7.7)
NEUTROPHILS RELATIVE PERCENT: 89.2 %
PDW BLD-RTO: 15.5 % (ref 12.4–15.4)
PERFORMED ON: ABNORMAL
PLATELET # BLD: 361 K/UL (ref 135–450)
PMV BLD AUTO: 7.7 FL (ref 5–10.5)
POTASSIUM REFLEX MAGNESIUM: 4.2 MMOL/L (ref 3.5–5.1)
PROCALCITONIN: 0.07 NG/ML (ref 0–0.15)
RBC # BLD: 4.04 M/UL (ref 4.2–5.9)
SODIUM BLD-SCNC: 137 MMOL/L (ref 136–145)
SPECIMEN STATUS: NORMAL
TOTAL PROTEIN: 6.7 G/DL (ref 6.4–8.2)
TROPONIN: 0.02 NG/ML
TROPONIN: 0.03 NG/ML
WBC # BLD: 15.6 K/UL (ref 4–11)

## 2018-11-09 PROCEDURE — 6360000002 HC RX W HCPCS: Performed by: EMERGENCY MEDICINE

## 2018-11-09 PROCEDURE — 94664 DEMO&/EVAL PT USE INHALER: CPT

## 2018-11-09 PROCEDURE — 99285 EMERGENCY DEPT VISIT HI MDM: CPT

## 2018-11-09 PROCEDURE — 96375 TX/PRO/DX INJ NEW DRUG ADDON: CPT

## 2018-11-09 PROCEDURE — 87040 BLOOD CULTURE FOR BACTERIA: CPT

## 2018-11-09 PROCEDURE — 94660 CPAP INITIATION&MGMT: CPT

## 2018-11-09 PROCEDURE — 2580000003 HC RX 258: Performed by: INTERNAL MEDICINE

## 2018-11-09 PROCEDURE — 83605 ASSAY OF LACTIC ACID: CPT

## 2018-11-09 PROCEDURE — 83735 ASSAY OF MAGNESIUM: CPT

## 2018-11-09 PROCEDURE — 6360000002 HC RX W HCPCS: Performed by: INTERNAL MEDICINE

## 2018-11-09 PROCEDURE — 36415 COLL VENOUS BLD VENIPUNCTURE: CPT

## 2018-11-09 PROCEDURE — 84145 PROCALCITONIN (PCT): CPT

## 2018-11-09 PROCEDURE — 2580000003 HC RX 258: Performed by: EMERGENCY MEDICINE

## 2018-11-09 PROCEDURE — 2060000000 HC ICU INTERMEDIATE R&B

## 2018-11-09 PROCEDURE — 6370000000 HC RX 637 (ALT 250 FOR IP): Performed by: INTERNAL MEDICINE

## 2018-11-09 PROCEDURE — 6370000000 HC RX 637 (ALT 250 FOR IP): Performed by: EMERGENCY MEDICINE

## 2018-11-09 PROCEDURE — 93010 ELECTROCARDIOGRAM REPORT: CPT | Performed by: INTERNAL MEDICINE

## 2018-11-09 PROCEDURE — 93005 ELECTROCARDIOGRAM TRACING: CPT | Performed by: INTERNAL MEDICINE

## 2018-11-09 PROCEDURE — 84484 ASSAY OF TROPONIN QUANT: CPT

## 2018-11-09 PROCEDURE — 85025 COMPLETE CBC W/AUTO DIFF WBC: CPT

## 2018-11-09 PROCEDURE — 96374 THER/PROPH/DIAG INJ IV PUSH: CPT

## 2018-11-09 PROCEDURE — 99406 BEHAV CHNG SMOKING 3-10 MIN: CPT

## 2018-11-09 PROCEDURE — 83036 HEMOGLOBIN GLYCOSYLATED A1C: CPT

## 2018-11-09 PROCEDURE — 80053 COMPREHEN METABOLIC PANEL: CPT

## 2018-11-09 PROCEDURE — 99223 1ST HOSP IP/OBS HIGH 75: CPT | Performed by: INTERNAL MEDICINE

## 2018-11-09 PROCEDURE — 93005 ELECTROCARDIOGRAM TRACING: CPT | Performed by: EMERGENCY MEDICINE

## 2018-11-09 PROCEDURE — 94640 AIRWAY INHALATION TREATMENT: CPT

## 2018-11-09 PROCEDURE — 71046 X-RAY EXAM CHEST 2 VIEWS: CPT

## 2018-11-09 RX ORDER — TRAZODONE HYDROCHLORIDE 50 MG/1
150 TABLET ORAL NIGHTLY
Status: DISCONTINUED | OUTPATIENT
Start: 2018-11-09 | End: 2018-11-13 | Stop reason: HOSPADM

## 2018-11-09 RX ORDER — GABAPENTIN 100 MG/1
100 CAPSULE ORAL 3 TIMES DAILY
Status: DISCONTINUED | OUTPATIENT
Start: 2018-11-09 | End: 2018-11-09

## 2018-11-09 RX ORDER — SODIUM CHLORIDE 0.9 % (FLUSH) 0.9 %
10 SYRINGE (ML) INJECTION EVERY 12 HOURS SCHEDULED
Status: DISCONTINUED | OUTPATIENT
Start: 2018-11-09 | End: 2018-11-13 | Stop reason: HOSPADM

## 2018-11-09 RX ORDER — SODIUM CHLORIDE, SODIUM LACTATE, CALCIUM CHLORIDE, MAGNESIUM CHLORIDE AND DEXTROSE 2.5; 538; 448; 18.3; 5.08 G/100ML; MG/100ML; MG/100ML; MG/100ML; MG/100ML
2000 INJECTION, SOLUTION INTRAPERITONEAL EVERY 4 HOURS
Status: DISCONTINUED | OUTPATIENT
Start: 2018-11-09 | End: 2018-11-10

## 2018-11-09 RX ORDER — INSULIN GLARGINE 100 [IU]/ML
16 INJECTION, SOLUTION SUBCUTANEOUS NIGHTLY
Status: DISCONTINUED | OUTPATIENT
Start: 2018-11-09 | End: 2018-11-10 | Stop reason: DRUGHIGH

## 2018-11-09 RX ORDER — DEXTROSE MONOHYDRATE 25 G/50ML
12.5 INJECTION, SOLUTION INTRAVENOUS PRN
Status: DISCONTINUED | OUTPATIENT
Start: 2018-11-09 | End: 2018-11-09 | Stop reason: SDUPTHER

## 2018-11-09 RX ORDER — NICOTINE POLACRILEX 4 MG
15 LOZENGE BUCCAL PRN
Status: DISCONTINUED | OUTPATIENT
Start: 2018-11-09 | End: 2018-11-09 | Stop reason: SDUPTHER

## 2018-11-09 RX ORDER — TORSEMIDE 100 MG/1
100 TABLET ORAL DAILY
Status: DISCONTINUED | OUTPATIENT
Start: 2018-11-09 | End: 2018-11-13 | Stop reason: HOSPADM

## 2018-11-09 RX ORDER — CARVEDILOL 6.25 MG/1
6.25 TABLET ORAL 2 TIMES DAILY WITH MEALS
Status: DISCONTINUED | OUTPATIENT
Start: 2018-11-09 | End: 2018-11-10

## 2018-11-09 RX ORDER — PROMETHAZINE HYDROCHLORIDE 25 MG/ML
12.5 INJECTION, SOLUTION INTRAMUSCULAR; INTRAVENOUS EVERY 6 HOURS PRN
Status: DISCONTINUED | OUTPATIENT
Start: 2018-11-09 | End: 2018-11-10

## 2018-11-09 RX ORDER — HEPARIN SODIUM 5000 [USP'U]/ML
5000 INJECTION, SOLUTION INTRAVENOUS; SUBCUTANEOUS EVERY 8 HOURS SCHEDULED
Status: DISCONTINUED | OUTPATIENT
Start: 2018-11-09 | End: 2018-11-13 | Stop reason: HOSPADM

## 2018-11-09 RX ORDER — IPRATROPIUM BROMIDE AND ALBUTEROL SULFATE 2.5; .5 MG/3ML; MG/3ML
1 SOLUTION RESPIRATORY (INHALATION) ONCE
Status: COMPLETED | OUTPATIENT
Start: 2018-11-09 | End: 2018-11-09

## 2018-11-09 RX ORDER — INSULIN GLARGINE 100 [IU]/ML
10 INJECTION, SOLUTION SUBCUTANEOUS NIGHTLY
Status: DISCONTINUED | OUTPATIENT
Start: 2018-11-09 | End: 2018-11-09

## 2018-11-09 RX ORDER — DEXTROSE MONOHYDRATE 50 MG/ML
100 INJECTION, SOLUTION INTRAVENOUS PRN
Status: DISCONTINUED | OUTPATIENT
Start: 2018-11-09 | End: 2018-11-09 | Stop reason: SDUPTHER

## 2018-11-09 RX ORDER — GABAPENTIN 100 MG/1
100 CAPSULE ORAL NIGHTLY
Status: DISCONTINUED | OUTPATIENT
Start: 2018-11-10 | End: 2018-11-13 | Stop reason: HOSPADM

## 2018-11-09 RX ORDER — CALCIUM CARBONATE 200(500)MG
500 TABLET,CHEWABLE ORAL 3 TIMES DAILY PRN
Status: DISCONTINUED | OUTPATIENT
Start: 2018-11-09 | End: 2018-11-13 | Stop reason: HOSPADM

## 2018-11-09 RX ORDER — CYCLOBENZAPRINE HCL 10 MG
10 TABLET ORAL 3 TIMES DAILY PRN
Status: DISCONTINUED | OUTPATIENT
Start: 2018-11-09 | End: 2018-11-13 | Stop reason: HOSPADM

## 2018-11-09 RX ORDER — DEXAMETHASONE SODIUM PHOSPHATE 4 MG/ML
6 INJECTION, SOLUTION INTRA-ARTICULAR; INTRALESIONAL; INTRAMUSCULAR; INTRAVENOUS; SOFT TISSUE ONCE
Status: COMPLETED | OUTPATIENT
Start: 2018-11-09 | End: 2018-11-09

## 2018-11-09 RX ORDER — DEXTROSE MONOHYDRATE 25 G/50ML
12.5 INJECTION, SOLUTION INTRAVENOUS PRN
Status: DISCONTINUED | OUTPATIENT
Start: 2018-11-09 | End: 2018-11-13 | Stop reason: HOSPADM

## 2018-11-09 RX ORDER — NICOTINE POLACRILEX 4 MG
15 LOZENGE BUCCAL PRN
Status: DISCONTINUED | OUTPATIENT
Start: 2018-11-09 | End: 2018-11-13 | Stop reason: HOSPADM

## 2018-11-09 RX ORDER — OXYCODONE AND ACETAMINOPHEN 7.5; 325 MG/1; MG/1
1 TABLET ORAL EVERY 4 HOURS PRN
Status: DISCONTINUED | OUTPATIENT
Start: 2018-11-09 | End: 2018-11-13 | Stop reason: HOSPADM

## 2018-11-09 RX ORDER — ALBUTEROL SULFATE 90 UG/1
2 AEROSOL, METERED RESPIRATORY (INHALATION) EVERY 6 HOURS PRN
Status: DISCONTINUED | OUTPATIENT
Start: 2018-11-09 | End: 2018-11-13 | Stop reason: HOSPADM

## 2018-11-09 RX ORDER — CLOPIDOGREL BISULFATE 75 MG/1
75 TABLET ORAL DAILY
Status: DISCONTINUED | OUTPATIENT
Start: 2018-11-09 | End: 2018-11-13 | Stop reason: HOSPADM

## 2018-11-09 RX ORDER — IPRATROPIUM BROMIDE AND ALBUTEROL SULFATE 2.5; .5 MG/3ML; MG/3ML
1 SOLUTION RESPIRATORY (INHALATION) EVERY 6 HOURS PRN
Status: DISCONTINUED | OUTPATIENT
Start: 2018-11-09 | End: 2018-11-13 | Stop reason: HOSPADM

## 2018-11-09 RX ORDER — NITROGLYCERIN 0.4 MG/1
0.4 TABLET SUBLINGUAL EVERY 5 MIN PRN
Status: DISCONTINUED | OUTPATIENT
Start: 2018-11-09 | End: 2018-11-13 | Stop reason: HOSPADM

## 2018-11-09 RX ORDER — ONDANSETRON 2 MG/ML
4 INJECTION INTRAMUSCULAR; INTRAVENOUS ONCE
Status: COMPLETED | OUTPATIENT
Start: 2018-11-09 | End: 2018-11-09

## 2018-11-09 RX ORDER — ASPIRIN 81 MG/1
81 TABLET, CHEWABLE ORAL DAILY
Status: DISCONTINUED | OUTPATIENT
Start: 2018-11-09 | End: 2018-11-13 | Stop reason: HOSPADM

## 2018-11-09 RX ORDER — ISOSORBIDE MONONITRATE 30 MG/1
30 TABLET, EXTENDED RELEASE ORAL DAILY
Status: DISCONTINUED | OUTPATIENT
Start: 2018-11-09 | End: 2018-11-13 | Stop reason: HOSPADM

## 2018-11-09 RX ORDER — INSULIN GLARGINE 100 [IU]/ML
10 INJECTION, SOLUTION SUBCUTANEOUS NIGHTLY
Status: DISCONTINUED | OUTPATIENT
Start: 2018-11-09 | End: 2018-11-10

## 2018-11-09 RX ORDER — DEXTROSE MONOHYDRATE 50 MG/ML
100 INJECTION, SOLUTION INTRAVENOUS PRN
Status: DISCONTINUED | OUTPATIENT
Start: 2018-11-09 | End: 2018-11-13 | Stop reason: HOSPADM

## 2018-11-09 RX ORDER — CITALOPRAM 20 MG/1
40 TABLET ORAL DAILY
Status: DISCONTINUED | OUTPATIENT
Start: 2018-11-09 | End: 2018-11-13 | Stop reason: HOSPADM

## 2018-11-09 RX ORDER — ONDANSETRON 2 MG/ML
4 INJECTION INTRAMUSCULAR; INTRAVENOUS EVERY 6 HOURS PRN
Status: DISCONTINUED | OUTPATIENT
Start: 2018-11-09 | End: 2018-11-09 | Stop reason: SINTOL

## 2018-11-09 RX ORDER — PRAVASTATIN SODIUM 80 MG/1
80 TABLET ORAL DAILY
Status: DISCONTINUED | OUTPATIENT
Start: 2018-11-09 | End: 2018-11-13 | Stop reason: HOSPADM

## 2018-11-09 RX ORDER — SODIUM CHLORIDE 0.9 % (FLUSH) 0.9 %
10 SYRINGE (ML) INJECTION PRN
Status: DISCONTINUED | OUTPATIENT
Start: 2018-11-09 | End: 2018-11-13 | Stop reason: HOSPADM

## 2018-11-09 RX ADMIN — PIPERACILLIN SODIUM,TAZOBACTAM SODIUM 2.25 G: 2; .25 INJECTION, POWDER, FOR SOLUTION INTRAVENOUS at 11:30

## 2018-11-09 RX ADMIN — TRAZODONE HYDROCHLORIDE 150 MG: 50 TABLET ORAL at 20:22

## 2018-11-09 RX ADMIN — CARVEDILOL 6.25 MG: 6.25 TABLET, FILM COATED ORAL at 17:20

## 2018-11-09 RX ADMIN — INSULIN LISPRO 18 UNITS: 100 INJECTION, SOLUTION INTRAVENOUS; SUBCUTANEOUS at 17:20

## 2018-11-09 RX ADMIN — HEPARIN SODIUM 5000 UNITS: 5000 INJECTION INTRAVENOUS; SUBCUTANEOUS at 22:04

## 2018-11-09 RX ADMIN — HEPARIN SODIUM 5000 UNITS: 5000 INJECTION INTRAVENOUS; SUBCUTANEOUS at 08:17

## 2018-11-09 RX ADMIN — IPRATROPIUM BROMIDE AND ALBUTEROL SULFATE 1 AMPULE: .5; 3 SOLUTION RESPIRATORY (INHALATION) at 03:13

## 2018-11-09 RX ADMIN — CLOPIDOGREL BISULFATE 75 MG: 75 TABLET ORAL at 08:09

## 2018-11-09 RX ADMIN — INSULIN LISPRO 9 UNITS: 100 INJECTION, SOLUTION INTRAVENOUS; SUBCUTANEOUS at 21:40

## 2018-11-09 RX ADMIN — CITALOPRAM HYDROBROMIDE 40 MG: 20 TABLET ORAL at 08:09

## 2018-11-09 RX ADMIN — PROMETHAZINE HYDROCHLORIDE 12.5 MG: 25 INJECTION INTRAMUSCULAR; INTRAVENOUS at 12:51

## 2018-11-09 RX ADMIN — OXYCODONE AND ACETAMINOPHEN 1 TABLET: 7.5; 325 TABLET ORAL at 20:21

## 2018-11-09 RX ADMIN — OXYCODONE AND ACETAMINOPHEN 1 TABLET: 7.5; 325 TABLET ORAL at 16:08

## 2018-11-09 RX ADMIN — HEPARIN SODIUM 5000 UNITS: 5000 INJECTION INTRAVENOUS; SUBCUTANEOUS at 12:52

## 2018-11-09 RX ADMIN — VANCOMYCIN HYDROCHLORIDE 1000 MG: 1 INJECTION, POWDER, LYOPHILIZED, FOR SOLUTION INTRAVENOUS at 08:10

## 2018-11-09 RX ADMIN — SODIUM CHLORIDE, PRESERVATIVE FREE 10 ML: 5 INJECTION INTRAVENOUS at 20:22

## 2018-11-09 RX ADMIN — ASPIRIN 81 MG 81 MG: 81 TABLET ORAL at 08:09

## 2018-11-09 RX ADMIN — PIPERACILLIN SODIUM,TAZOBACTAM SODIUM 3.38 G: 3; .375 INJECTION, POWDER, FOR SOLUTION INTRAVENOUS at 06:06

## 2018-11-09 RX ADMIN — ONDANSETRON 4 MG: 2 INJECTION INTRAMUSCULAR; INTRAVENOUS at 04:03

## 2018-11-09 RX ADMIN — ISOSORBIDE MONONITRATE 30 MG: 30 TABLET, EXTENDED RELEASE ORAL at 08:10

## 2018-11-09 RX ADMIN — ANTACID TABLETS 500 MG: 500 TABLET, CHEWABLE ORAL at 17:25

## 2018-11-09 RX ADMIN — HYDROMORPHONE HYDROCHLORIDE 1 MG: 1 INJECTION, SOLUTION INTRAMUSCULAR; INTRAVENOUS; SUBCUTANEOUS at 04:03

## 2018-11-09 RX ADMIN — OXYCODONE AND ACETAMINOPHEN 1 TABLET: 7.5; 325 TABLET ORAL at 08:10

## 2018-11-09 RX ADMIN — SODIUM CHLORIDE, SODIUM LACTATE, CALCIUM CHLORIDE, MAGNESIUM CHLORIDE AND DEXTROSE 2000 ML: 2.5; 538; 448; 18.3; 5.08 INJECTION, SOLUTION INTRAPERITONEAL at 16:10

## 2018-11-09 RX ADMIN — CYCLOBENZAPRINE HYDROCHLORIDE 10 MG: 10 TABLET, FILM COATED ORAL at 17:20

## 2018-11-09 RX ADMIN — NITROGLYCERIN 1 INCH: 20 OINTMENT TOPICAL at 03:37

## 2018-11-09 RX ADMIN — SODIUM CHLORIDE, PRESERVATIVE FREE 10 ML: 5 INJECTION INTRAVENOUS at 08:10

## 2018-11-09 RX ADMIN — DEXAMETHASONE SODIUM PHOSPHATE 6 MG: 4 INJECTION, SOLUTION INTRAMUSCULAR; INTRAVENOUS at 03:37

## 2018-11-09 RX ADMIN — PRAVASTATIN SODIUM 80 MG: 80 TABLET ORAL at 08:09

## 2018-11-09 RX ADMIN — CARVEDILOL 6.25 MG: 6.25 TABLET, FILM COATED ORAL at 08:10

## 2018-11-09 RX ADMIN — TORSEMIDE 100 MG: 100 TABLET ORAL at 08:09

## 2018-11-09 RX ADMIN — GABAPENTIN 100 MG: 100 CAPSULE ORAL at 08:10

## 2018-11-09 ASSESSMENT — ENCOUNTER SYMPTOMS
CHEST TIGHTNESS: 1
NAUSEA: 0
WHEEZING: 1
COLOR CHANGE: 0
DIARRHEA: 0
STRIDOR: 0
VOMITING: 0
SHORTNESS OF BREATH: 1
BACK PAIN: 1
COUGH: 1
ABDOMINAL PAIN: 1

## 2018-11-09 ASSESSMENT — PAIN DESCRIPTION - PAIN TYPE
TYPE: ACUTE PAIN
TYPE: CHRONIC PAIN

## 2018-11-09 ASSESSMENT — PAIN DESCRIPTION - DESCRIPTORS: DESCRIPTORS: TIGHTNESS

## 2018-11-09 ASSESSMENT — PAIN SCALES - GENERAL
PAINLEVEL_OUTOF10: 6
PAINLEVEL_OUTOF10: 10
PAINLEVEL_OUTOF10: 10
PAINLEVEL_OUTOF10: 8
PAINLEVEL_OUTOF10: 8
PAINLEVEL_OUTOF10: 6
PAINLEVEL_OUTOF10: 8

## 2018-11-09 ASSESSMENT — PAIN DESCRIPTION - LOCATION
LOCATION: BACK
LOCATION: CHEST
LOCATION: BACK
LOCATION: BACK

## 2018-11-09 ASSESSMENT — PAIN DESCRIPTION - PROGRESSION: CLINICAL_PROGRESSION: NOT CHANGED

## 2018-11-09 ASSESSMENT — PAIN DESCRIPTION - ONSET: ONSET: ON-GOING

## 2018-11-09 ASSESSMENT — PAIN DESCRIPTION - FREQUENCY: FREQUENCY: CONTINUOUS

## 2018-11-09 NOTE — PROGRESS NOTES
Pt set up for PD for an exchange and pt not flowing and needs PD flushed. Dr. Corinne So has been messaged.

## 2018-11-09 NOTE — CONSULTS
cardiomyopathy    Chronic renal failure, stage 5 ([facility]    Tobacco abuse    CKD stage 4 due to type 2 diabetes mellitus ([facility]    CVA (cerebral vascular accident) ([facility]    Arterial ischemic stroke, ICA, right, acute [facility]    Type 2 diabetes mellitus without complication, without long-term current use of insulin ([facility]    HTN (hypertension), benign    ZAINAB (obstructive sleep apnea)    Diarrhea    Pleural effusion    Chronic anemia    Aortic valve stenosis    Hypervolemia    Hyperkalemia    Morbid obesity ([facility])    Mucus plugging of bronchi    Hemodialysis-associated hypotension    Left arm pain    Dizziness    ESRD (end stage renal disease) on dialysis (HCC)    Hypotension due to drugs    Acute diastolic CHF (congestive heart failure) ([facility])    Neuromuscular disorder ([facility])    Acute combined systolic and diastolic CHF, NYHA class 4 ([facility])    Renovascular hypertension    Mixed hyperlipidemia    Cigarette nicotine dependence in remission    [facility] status [facility]    Acute respiratory failure ([facility])    Pulmonary edema    Fluid overload       Allergies:  Morphine     Temp max: 98.3 here, but pt reports 100.7 at home prior to admission. Recent Labs      11/09/18   0255   BUN  78*       Recent Labs      11/09/18   0255   CREATININE  4.6*       Recent Labs      11/09/18   0255   WBC  15.6*         Intake/Output Summary (Last 24 hours) at 11/09/18 1016  Last data filed at 11/09/18 0528   Gross per 24 hour   Intake 0 ml   Output 0 ml   Net 0 ml   Pt makes little to no urine. Culture Date      Source                       Results  11/9 blood cultures ordered, unsure if completed    Ht Readings from Last 1 Encounters:   11/09/18 5' 9\" (1.753 m)        Wt Readings from Last 1 Encounters:   11/09/18 212 lb 14.4 oz (96.6 kg)         Body mass index is 31.44 kg/m². Estimated Creatinine Clearance: 22 mL/min (A) (based on SCr of 4.6 mg/dL (H)).     Goal Trough Level: 15--20 mcg/mL    Assessment/Plan:  Will initiate vancomycin 1000 mg iv for one dose and get trough level in am on 11/10. Changed base to Saline on antibiotics due to patient's glucose readings and being Diabetic. Timing of trough level will be determined based on culture results, renal function, and clinical response. Trough on 11/10 at 10am.     Thank you for the consult. Will continue to follow.     Maximino Chew, PharmD, [address] [employer] [address] [phone]

## 2018-11-09 NOTE — ED PROVIDER NOTES
[address] [facility], [facility] [place]   Phone [phone]   TROPONIN - Abnormal; Notable for the following:     Troponin 0.03 (*)     All other components within normal limits    Narrative:     Performed at:  [address], [facility] [place]   Phone [phone]   CULTURE BLOOD #1   CULTURE BLOOD #2   SAMPLE POSSIBLE BLOOD BANK TESTING    Narrative:     Performed at:  [facility]) [place] [address] [place]   Phone [phone]   LACTIC ACID, PLASMA    Narrative:     Performed at:  [place] [address] [place]   Phone [phone]   MAGNESIUM    Narrative:     Performed at:  [place] [address] [place]   Phone [phone]       All other labs were within normal range or not returned asof this dictation. EKG: All EKG's are interpreted by the Emergency Department Physician who either signs or Co-signs this chart in the absence of a cardiologist.    The Ekg interpreted by me shows  normal sinus rhythm with a rate of 79  Axis is   Normal  QTc is  prolonged QT  Intervals and Durations are unremarkable.       ST Segments: no acute change and nonspecific changes  No significant change from prior EKG dated - 9/14/18  No STEMI           RADIOLOGY:   Non-plain film images such as CT, Ultrasound and MRI are read by the radiologist. [facility] images are visualized and preliminarily interpreted by the  ED Provider with the belowfindings:        Interpretation per the Radiologist below, if available at the time of this note:    XR CHEST STANDARD (2 VW)   Preliminary Result   Increased pulmonary vascular congestion and interstitial opacities suggest   increase of fluid overload from prior exam               PROCEDURES   Unless otherwise noted below, none     Procedures    CRITICAL CARE TIME N/A        CONSULTS: Spoke with Dr. Anahy James at [phone] for admission. IP CONSULT TO HOSPITALIST  IP CONSULT TO RESPIRATORY CARE  IP CONSULT TO NEPHROLOGY    EMERGENCY DEPARTMENT COURSE and DIFFERENTIAL DIAGNOSIS/MDM:   Vitals:    Vitals:    11/09/18 0313 11/09/18 0420 11/09/18 0525 11/09/18 0528   BP:   (!) 152/78    Pulse:   69    Resp: 18 17 15    Temp:   96.4 °F (35.8 °C)    TempSrc:   Axillary    SpO2: 93%  98%    Weight:    212 lb 14.4 oz (96.6 kg)   Height:           Patient was given the following medications:  Medications   vancomycin 1000 mg IVPB in 250 mL D5W addavial (not administered)   ipratropium-albuterol (DUONEB) nebulizer solution 1 ampule (1 ampule Inhalation Given 11/9/18 0313)   nitroglycerin (NITRO-BID) 2 % ointment 1 inch (1 inch Topical Given 11/9/18 0337)   dexamethasone (DECADRON) injection 6 mg (6 mg Intravenous Given 11/9/18 0337)   HYDROmorphone (DILAUDID) injection 1 mg (1 mg Intravenous Given 11/9/18 0403)   ondansetron (ZOFRAN) injection 4 mg (4 mg Intravenous Given 11/9/18 0403)   piperacillin-tazobactam (ZOSYN) 3.375 g in dextrose 5 % 50 mL IVPB (mini-bag) (3.375 g Intravenous New Bag 11/9/18 0606)     Patient was evaluated due to concern for worsening respiratory status associated with distress. X-ray was concerning for congestive heart failure exacerbation versus fluid overload from chronic renal insufficiency. He was placed on BiPAP and this helped greatly with his breathing. Since he had a fever yesterday, I did start him on vancomycin and Zosyn in case there is a component of pneumonia with his shortness of breath. Otherwise, hospitalist plans to call nephrology for urgent dialysis in the hospital.  He is stable for stepdown unit at this time and was in no acute distress while on BiPAP at time of admission. The patient tolerated their visit well. The patient and / or the family were informed of the results of any tests, a time was given to answer questions.     FINAL

## 2018-11-09 NOTE — H&P
APRN - CNP   carvedilol (COREG) 12.5 MG tablet Take 0.5 tablets by mouth 2 times daily (with meals)  Patient taking differently: Take 12.5 mg by mouth 2 times daily (with meals)  9/25/18  Yes Dalia Davis MD   oxyCODONE-acetaminophen (PERCOCET) 7.5-325 MG per tablet Take 1 tablet by mouth every 4 hours as needed for Pain. .   Yes Historical Provider, MD   isosorbide mononitrate (IMDUR) 30 MG extended release tablet TAKE 1 TABLET EVERY DAY 6/22/18  Yes JAY Gonzales CNP   pravastatin (PRAVACHOL) 80 MG tablet Take 1 tablet by mouth daily 6/19/18  Yes JAY Gonzales CNP   Pediatric Multivitamins-Fl (MULTI VIT/FL) 0.25 MG CHEW  5/2/18  Yes Historical Provider, MD   traZODone (DESYREL) 150 MG tablet Take 1 tablet by mouth nightly 4/25/18  Yes Yoseph Canales MD   clopidogrel (PLAVIX) 75 MG tablet TAKE 1 TABLET EVERY DAY 4/25/18  Yes Yoseph Canales MD   torsemide [facility]) 100 MG tablet Take 1 tablet by mouth daily 3/25/18  Yes Kasey Barry MD   B Complex-C-Folic Acid (VIRT-CAPS) 1 MG CAPS TK ONE C PO  QD 1/15/18  Yes Historical Provider, MD   cyclobenzaprine (FLEXERIL) 10 MG tablet TK 1 T PO Q 8 H PRN 1/16/18  Yes Historical Provider, MD   albuterol sulfate  (90 Base) MCG/ACT inhaler Inhale 2 puffs into the lungs every 6 hours as needed for Wheezing 11/8/17  Yes Theresa Galaviz MD   aspirin 81 MG chewable tablet Take 1 tablet by mouth daily. 5/14/13  Yes Judi Davis MD   gabapentin (NEURONTIN) 100 MG capsule Take 1 capsule by mouth 3 times daily for 90 days. . 10/8/18 1/6/19  JAY Gibbs CNP   insulin glargine (LANTUS) 100 UNIT/ML injection vial Inject 10 Units into the skin nightly 9/25/18 10/25/18  Dalia Davis MD   Insulin Syringe-Needle U-100 30G X 1/2\" 0.5 ML MISC 1 each by Does not apply route daily 9/25/18   Dalia Davis MD   Magnesium Hydroxide (MILK OF MAGNESIA PO)  5/2/18   Historical Provider, MD   Polyethylene Glycol 3350 GRAN  5/2/18 Historical Provider, MD   hydrOXYzine (VISTARIL) 50 MG capsule TAKE 1 TO 2 CAPSULES EVERY NIGHT 5/16/18   Rip Manzanares MD   Glucose Blood (BLOOD GLUCOSE TEST STRIPS) STRP TEST 3-4 TIMES DAILY, AS DIRECTED 4/25/18   Rip Manzanares MD   ACCU-CHEK FASTCLIX LANCETS MISC TEST 3-4 TIMES DAILY, AS DIRECTED 4/25/18   Rip Manzanares MD   Blood Glucose Monitoring Suppl ADAM USE AS DIRECTED. 4/25/18   Rip Manzanares MD   Alcohol Swabs PADS USE AS DIRECTED 4/25/18   Rip Manzanares MD   ipratropium-albuterol (DUONEB) 0.5-2.5 (3) MG/3ML SOLN nebulizer solution Inhale 3 mLs into the lungs every 6 hours as needed for Shortness of Breath 10/15/17   Estefani Rosas MD   glucose blood VI test strips (FREESTYLE LITE) strip Daily As needed. 5/25/17   Hemanth Costello MD   glucose monitoring kit (FREESTYLE) monitoring kit 1 kit by Does not apply route daily 5/25/17   Hemanth Costello MD   Lancets MISC Test daily 5/25/17   Hemanth Costello MD   nitroGLYCERIN (NITROSTAT) 0.4 MG SL tablet Place 1 tablet under the tongue every 5 minutes as needed for Chest pain 5/27/15   Gonzalo Pichardo MD   calcium carbonate (TUMS) 500 MG chewable tablet Take 1 tablet by mouth 3 times daily as needed for Heartburn. Historical Provider, MD       Allergies:  Morphine    Social History:      The patient currently lives at home   TOBACCO:   reports that he has been smoking Cigarettes. He has a 8.25 pack-year smoking history. He has never used smokeless tobacco.  ETOH:   reports that he does not drink alcohol. Family History:       Positive as follows:    Family History   Problem Relation Age of Onset    Diabetes Mother     Heart Disease Father     Kidney Disease Sister    Yoselin Cozier Sister     Heart Disease Sister     Obesity Sister     Cancer Sister     Heart Disease Sister     Obesity Sister        REVIEW OF SYSTEMS:  Constipation X5 days which he had been taking laxatives and had BM's yesterday.   Pertinent

## 2018-11-09 NOTE — CARE COORDINATION
[facility]    Writer received notification from CM regarding pt's admission. Pt is active with [facility]. Pt is scheduled to have SN services with [facility] until 11/25, longer if needed. Pt has been dc'ed from PT/OT prior to admission. Writer made aware pt is to have an outpt sleep study and then hopefully will be getting a new CPAP. I will continue to follow patient for needs and arrange for [facility] with [facility] upon [address] home. Should pt dc over the weekend please fax facesheet, order, and CELINE to [facility].      Aurora Bloch RN CTN with [facility]  LPQ:[phone]

## 2018-11-10 LAB
ANION GAP SERPL CALCULATED.3IONS-SCNC: 15 MMOL/L (ref 3–16)
BASOPHILS ABSOLUTE: 0 K/UL (ref 0–0.2)
BASOPHILS RELATIVE PERCENT: 0 %
BUN BLDV-MCNC: 89 MG/DL (ref 7–20)
CALCIUM SERPL-MCNC: 8.6 MG/DL (ref 8.3–10.6)
CHLORIDE BLD-SCNC: 90 MMOL/L (ref 99–110)
CO2: 28 MMOL/L (ref 21–32)
CREAT SERPL-MCNC: 4.6 MG/DL (ref 0.9–1.3)
EOSINOPHILS ABSOLUTE: 0 K/UL (ref 0–0.6)
EOSINOPHILS RELATIVE PERCENT: 0.2 %
GFR AFRICAN AMERICAN: 16
GFR NON-AFRICAN AMERICAN: 14
GLUCOSE BLD-MCNC: 204 MG/DL (ref 70–99)
GLUCOSE BLD-MCNC: 205 MG/DL (ref 70–99)
GLUCOSE BLD-MCNC: 220 MG/DL (ref 70–99)
GLUCOSE BLD-MCNC: 279 MG/DL (ref 70–99)
HCT VFR BLD CALC: 30.8 % (ref 40.5–52.5)
HEMOGLOBIN: 10.7 G/DL (ref 13.5–17.5)
LYMPHOCYTES ABSOLUTE: 0.9 K/UL (ref 1–5.1)
LYMPHOCYTES RELATIVE PERCENT: 7.8 %
MCH RBC QN AUTO: 30.5 PG (ref 26–34)
MCHC RBC AUTO-ENTMCNC: 34.7 G/DL (ref 31–36)
MCV RBC AUTO: 87.9 FL (ref 80–100)
MONOCYTES ABSOLUTE: 0.7 K/UL (ref 0–1.3)
MONOCYTES RELATIVE PERCENT: 6.1 %
NEUTROPHILS ABSOLUTE: 10.1 K/UL (ref 1.7–7.7)
NEUTROPHILS RELATIVE PERCENT: 85.9 %
PDW BLD-RTO: 15.2 % (ref 12.4–15.4)
PERFORMED ON: ABNORMAL
PLATELET # BLD: 287 K/UL (ref 135–450)
PMV BLD AUTO: 8.1 FL (ref 5–10.5)
POTASSIUM REFLEX MAGNESIUM: 3.9 MMOL/L (ref 3.5–5.1)
RBC # BLD: 3.51 M/UL (ref 4.2–5.9)
SODIUM BLD-SCNC: 133 MMOL/L (ref 136–145)
WBC # BLD: 11.8 K/UL (ref 4–11)

## 2018-11-10 PROCEDURE — 6360000002 HC RX W HCPCS: Performed by: INTERNAL MEDICINE

## 2018-11-10 PROCEDURE — 36415 COLL VENOUS BLD VENIPUNCTURE: CPT

## 2018-11-10 PROCEDURE — 2580000003 HC RX 258: Performed by: INTERNAL MEDICINE

## 2018-11-10 PROCEDURE — 3E1M39Z IRRIGATION OF PERITONEAL CAVITY USING DIALYSATE, PERCUTANEOUS APPROACH: ICD-10-PCS | Performed by: INTERNAL MEDICINE

## 2018-11-10 PROCEDURE — 2060000000 HC ICU INTERMEDIATE R&B

## 2018-11-10 PROCEDURE — 94664 DEMO&/EVAL PT USE INHALER: CPT

## 2018-11-10 PROCEDURE — 90937 HEMODIALYSIS REPEATED EVAL: CPT

## 2018-11-10 PROCEDURE — 94640 AIRWAY INHALATION TREATMENT: CPT

## 2018-11-10 PROCEDURE — 6370000000 HC RX 637 (ALT 250 FOR IP): Performed by: INTERNAL MEDICINE

## 2018-11-10 PROCEDURE — 94660 CPAP INITIATION&MGMT: CPT

## 2018-11-10 PROCEDURE — 80048 BASIC METABOLIC PNL TOTAL CA: CPT

## 2018-11-10 PROCEDURE — 99406 BEHAV CHNG SMOKING 3-10 MIN: CPT

## 2018-11-10 PROCEDURE — 85025 COMPLETE CBC W/AUTO DIFF WBC: CPT

## 2018-11-10 PROCEDURE — 99233 SBSQ HOSP IP/OBS HIGH 50: CPT | Performed by: INTERNAL MEDICINE

## 2018-11-10 RX ORDER — INSULIN GLARGINE 100 [IU]/ML
20 INJECTION, SOLUTION SUBCUTANEOUS NIGHTLY
Status: DISCONTINUED | OUTPATIENT
Start: 2018-11-10 | End: 2018-11-13

## 2018-11-10 RX ORDER — SODIUM CHLORIDE, SODIUM LACTATE, CALCIUM CHLORIDE, MAGNESIUM CHLORIDE AND DEXTROSE 2.5; 538; 448; 18.3; 5.08 G/100ML; MG/100ML; MG/100ML; MG/100ML; MG/100ML
2000 INJECTION, SOLUTION INTRAPERITONEAL EVERY 4 HOURS
Status: DISCONTINUED | OUTPATIENT
Start: 2018-11-10 | End: 2018-11-13 | Stop reason: HOSPADM

## 2018-11-10 RX ORDER — LOSARTAN POTASSIUM 25 MG/1
50 TABLET ORAL DAILY
Status: DISCONTINUED | OUTPATIENT
Start: 2018-11-10 | End: 2018-11-13 | Stop reason: HOSPADM

## 2018-11-10 RX ORDER — CARVEDILOL 6.25 MG/1
12.5 TABLET ORAL 2 TIMES DAILY WITH MEALS
Status: DISCONTINUED | OUTPATIENT
Start: 2018-11-10 | End: 2018-11-13 | Stop reason: HOSPADM

## 2018-11-10 RX ORDER — PROMETHAZINE HYDROCHLORIDE 25 MG/ML
25 INJECTION, SOLUTION INTRAMUSCULAR; INTRAVENOUS EVERY 6 HOURS PRN
Status: DISCONTINUED | OUTPATIENT
Start: 2018-11-10 | End: 2018-11-13 | Stop reason: HOSPADM

## 2018-11-10 RX ORDER — HEPARIN SODIUM 1000 [USP'U]/ML
3800 INJECTION, SOLUTION INTRAVENOUS; SUBCUTANEOUS PRN
Status: DISCONTINUED | OUTPATIENT
Start: 2018-11-10 | End: 2018-11-13 | Stop reason: HOSPADM

## 2018-11-10 RX ADMIN — INSULIN LISPRO 6 UNITS: 100 INJECTION, SOLUTION INTRAVENOUS; SUBCUTANEOUS at 13:14

## 2018-11-10 RX ADMIN — ANTACID TABLETS 500 MG: 500 TABLET, CHEWABLE ORAL at 07:53

## 2018-11-10 RX ADMIN — CARVEDILOL 6.25 MG: 6.25 TABLET, FILM COATED ORAL at 08:11

## 2018-11-10 RX ADMIN — CLOPIDOGREL BISULFATE 75 MG: 75 TABLET ORAL at 08:10

## 2018-11-10 RX ADMIN — PROMETHAZINE HYDROCHLORIDE 25 MG: 25 INJECTION INTRAMUSCULAR; INTRAVENOUS at 08:11

## 2018-11-10 RX ADMIN — INSULIN LISPRO 3 UNITS: 100 INJECTION, SOLUTION INTRAVENOUS; SUBCUTANEOUS at 21:55

## 2018-11-10 RX ADMIN — ISOSORBIDE MONONITRATE 30 MG: 30 TABLET, EXTENDED RELEASE ORAL at 08:11

## 2018-11-10 RX ADMIN — TORSEMIDE 100 MG: 100 TABLET ORAL at 08:10

## 2018-11-10 RX ADMIN — HEPARIN SODIUM 5000 UNITS: 5000 INJECTION INTRAVENOUS; SUBCUTANEOUS at 05:24

## 2018-11-10 RX ADMIN — OXYCODONE AND ACETAMINOPHEN 1 TABLET: 7.5; 325 TABLET ORAL at 05:29

## 2018-11-10 RX ADMIN — ASPIRIN 81 MG 81 MG: 81 TABLET ORAL at 08:11

## 2018-11-10 RX ADMIN — INSULIN GLARGINE 20 UNITS: 100 INJECTION, SOLUTION SUBCUTANEOUS at 21:55

## 2018-11-10 RX ADMIN — OXYCODONE AND ACETAMINOPHEN 1 TABLET: 7.5; 325 TABLET ORAL at 23:05

## 2018-11-10 RX ADMIN — TRAZODONE HYDROCHLORIDE 150 MG: 50 TABLET ORAL at 21:53

## 2018-11-10 RX ADMIN — SODIUM CHLORIDE, SODIUM LACTATE, CALCIUM CHLORIDE, MAGNESIUM CHLORIDE AND DEXTROSE 2000 ML: 2.5; 538; 448; 18.3; 5.08 INJECTION, SOLUTION INTRAPERITONEAL at 00:00

## 2018-11-10 RX ADMIN — GABAPENTIN 100 MG: 100 CAPSULE ORAL at 21:53

## 2018-11-10 RX ADMIN — INSULIN LISPRO 9 UNITS: 100 INJECTION, SOLUTION INTRAVENOUS; SUBCUTANEOUS at 08:12

## 2018-11-10 RX ADMIN — PIPERACILLIN SODIUM,TAZOBACTAM SODIUM 2.25 G: 2; .25 INJECTION, POWDER, FOR SOLUTION INTRAVENOUS at 00:31

## 2018-11-10 RX ADMIN — HEPARIN SODIUM 3800 UNITS: 1000 INJECTION INTRAVENOUS; SUBCUTANEOUS at 17:32

## 2018-11-10 RX ADMIN — SODIUM CHLORIDE, PRESERVATIVE FREE 10 ML: 5 INJECTION INTRAVENOUS at 08:12

## 2018-11-10 RX ADMIN — ANTACID TABLETS 500 MG: 500 TABLET, CHEWABLE ORAL at 00:30

## 2018-11-10 RX ADMIN — OXYCODONE AND ACETAMINOPHEN 1 TABLET: 7.5; 325 TABLET ORAL at 13:22

## 2018-11-10 RX ADMIN — OXYCODONE AND ACETAMINOPHEN 1 TABLET: 7.5; 325 TABLET ORAL at 18:37

## 2018-11-10 RX ADMIN — OXYCODONE AND ACETAMINOPHEN 1 TABLET: 7.5; 325 TABLET ORAL at 00:30

## 2018-11-10 RX ADMIN — IPRATROPIUM BROMIDE AND ALBUTEROL SULFATE 3 ML: .5; 3 SOLUTION RESPIRATORY (INHALATION) at 08:50

## 2018-11-10 RX ADMIN — SODIUM CHLORIDE, SODIUM LACTATE, CALCIUM CHLORIDE, MAGNESIUM CHLORIDE AND DEXTROSE 2000 ML: 2.5; 538; 448; 18.3; 5.08 INJECTION, SOLUTION INTRAPERITONEAL at 04:26

## 2018-11-10 RX ADMIN — HEPARIN SODIUM 5000 UNITS: 5000 INJECTION INTRAVENOUS; SUBCUTANEOUS at 13:10

## 2018-11-10 RX ADMIN — PRAVASTATIN SODIUM 80 MG: 80 TABLET ORAL at 08:10

## 2018-11-10 RX ADMIN — CITALOPRAM HYDROBROMIDE 40 MG: 20 TABLET ORAL at 08:10

## 2018-11-10 RX ADMIN — SODIUM CHLORIDE, PRESERVATIVE FREE 10 ML: 5 INJECTION INTRAVENOUS at 22:00

## 2018-11-10 RX ADMIN — HEPARIN SODIUM 5000 UNITS: 5000 INJECTION INTRAVENOUS; SUBCUTANEOUS at 21:56

## 2018-11-10 RX ADMIN — SODIUM CHLORIDE, SODIUM LACTATE, CALCIUM CHLORIDE, MAGNESIUM CHLORIDE AND DEXTROSE 2000 ML: 2.5; 538; 448; 18.3; 5.08 INJECTION, SOLUTION INTRAPERITONEAL at 10:18

## 2018-11-10 ASSESSMENT — PAIN SCALES - GENERAL
PAINLEVEL_OUTOF10: 7
PAINLEVEL_OUTOF10: 0
PAINLEVEL_OUTOF10: 7
PAINLEVEL_OUTOF10: 6
PAINLEVEL_OUTOF10: 9
PAINLEVEL_OUTOF10: 9
PAINLEVEL_OUTOF10: 6
PAINLEVEL_OUTOF10: 0
PAINLEVEL_OUTOF10: 8

## 2018-11-10 ASSESSMENT — PAIN DESCRIPTION - PAIN TYPE
TYPE: CHRONIC PAIN
TYPE: CHRONIC PAIN

## 2018-11-10 ASSESSMENT — PAIN DESCRIPTION - LOCATION
LOCATION: BACK
LOCATION: BACK

## 2018-11-10 NOTE — PROGRESS NOTES
Department of Internal Medicine  Nephrology Progress Note        SUBJECTIVE:    We are following this patient for ESRD management. The patient was seen and examined; he feels well today with no CP, SOB, nausea or vomiting. ROS: No fever or chills. Social: No family at bedside. Physical Exam:    VITALS:  BP (!) 176/82   Pulse 75   Temp 97.4 °F (36.3 °C) (Oral)   Resp 16   Ht 5' 9\" (1.753 m)   Wt 212 lb 14.4 oz (96.6 kg)   SpO2 94%   BMI 31.44 kg/m²     General appearance: Seems comfortable, no acute distress. Neck: Trachea midline, thyroid normal.   Lungs:  Non labored breathing, CTA to anterior auscultation. Heart:  S1S2 normal, rub or gallop. No peripheral edema. Abdomen: Soft, non-tender, no organomegaly. Skin: No lesions or rashes, warm to touch. DATA:    CBC:   Lab Results   Component Value Date    WBC 11.8 11/10/2018    RBC 3.51 11/10/2018    HGB 10.7 11/10/2018    HCT 30.8 11/10/2018    MCV 87.9 11/10/2018    MCH 30.5 11/10/2018    MCHC 34.7 11/10/2018    RDW 15.2 11/10/2018     11/10/2018    MPV 8.1 11/10/2018     BMP:    Lab Results   Component Value Date     11/10/2018    K 3.9 11/10/2018    CL 90 11/10/2018    CO2 28 11/10/2018    BUN 89 11/10/2018    LABALBU 3.8 11/09/2018    CREATININE 4.6 11/10/2018    CALCIUM 8.6 11/10/2018    GFRAA 16 11/10/2018    GFRAA >60 05/17/2013    LABGLOM 14 11/10/2018    GLUCOSE 205 11/10/2018       IMPRESSION/RECOMMENDATIONS:      1. ESRD  -continue CAPD regimen, manual exchanges, 6 exchanges/day, 2L 2.5% Dianeal  -will arrange for 3 hours of hemodialysis today for further fluid removal    2. Dyspnea secondary to pulmonary edema  - will attempt further ultrafiltration with dialysis    3. COPD, ZAINAB  -pulmonary following    4. HTN  -will adjust his antihypertensive regimen

## 2018-11-10 NOTE — PLAN OF CARE
Problem: Pain:  Goal: Pain level will decrease  Pain level will decrease   Outcome: Ongoing  Pt states he undestands the 0-10 numeric pain scale. Pt also states that his pain is being adequately treated and will reach out to report changes. Problem: Falls - Risk of:  Goal: Will remain free from falls  Will remain free from falls   Outcome: Ongoing  Pt high fall risk. Instructed to use call light before getting out of bed. Call light within reach. Bed in low position. Bed alarm on. Will continue to monitor.

## 2018-11-10 NOTE — PROGRESS NOTES
Pt taken off bipap and now on RA. PD currently dwelling. 2000 mls drained and 2000 mls instilled at 0500. VSS. PRN Percocet given for chronic back pain. No other complaints or concerns at this time.

## 2018-11-10 NOTE — PROGRESS NOTES
Vitals:    11/10/18 0752   BP: (!) 176/82   Pulse: 75   Resp: 16   Temp: 97.4 °F (36.3 °C)   SpO2: 94%     Pt sitting quietly in bed alert and oriented. Pt had no acute events overnight. Pt having heartburn pain at this time and denies having further needs. Bed locked in lowest position, call light within reach, bedside table within reach. Will continue to monitor.

## 2018-11-10 NOTE — PROGRESS NOTES
Hospitalist Progress Note      PCP: Yamilet Doe, APRN - CNP    Date of Admission: 11/9/2018    Chief Complaint: SOB     Hospital Course: admitted with acute resp failure due to volume overload. Pt with hx of ESRD on peritoneal HD      Subjective:     Pt used BIPAP for a few hrs last night. SOB fairly unchanged and denied any chest pain.  Currently sating well on RA       Medications:  Reviewed    Infusion Medications    dextrose       Scheduled Medications    dianeal lo-trever 2.5%  2,000 mL Intraperitoneal Q4H    aspirin  81 mg Oral Daily    carvedilol  6.25 mg Oral BID WC    citalopram  40 mg Oral Daily    clopidogrel  75 mg Oral Daily    insulin glargine  10 Units Subcutaneous Nightly    isosorbide mononitrate  30 mg Oral Daily    pravastatin  80 mg Oral Daily    torsemide  100 mg Oral Daily    traZODone  150 mg Oral Nightly    sodium chloride flush  10 mL Intravenous 2 times per day    heparin (porcine)  5,000 Units Subcutaneous 3 times per day    vancomycin (VANCOCIN) intermittent dosing (placeholder)   Other RX Placeholder    piperacillin-tazobactam (ZOSYN) 2.25 g in sodium chloride 0.9% IVPB (mini-bag)  2.25 g Intravenous Q12H    vancomycin 1000mg in  mL  1,000 mg Intravenous Once    gabapentin  100 mg Oral Nightly    insulin lispro  0-18 Units Subcutaneous TID WC    insulin lispro  0-9 Units Subcutaneous Nightly    insulin glargine  16 Units Subcutaneous Nightly    insulin regular  5 Units Subcutaneous Once     PRN Meds: promethazine, albuterol sulfate HFA, calcium carbonate, ipratropium-albuterol, nitroGLYCERIN, oxyCODONE-acetaminophen, sodium chloride flush, magnesium hydroxide, glucose, dextrose, glucagon (rDNA), dextrose, cyclobenzaprine      Intake/Output Summary (Last 24 hours) at 11/10/18 0932  Last data filed at 11/10/18 0626   Gross per 24 hour   Intake             7090 ml   Output             4300 ml   Net             2790 ml       Physical Exam Performed:    BP

## 2018-11-11 LAB
ALBUMIN SERPL-MCNC: 3.1 G/DL (ref 3.4–5)
ANION GAP SERPL CALCULATED.3IONS-SCNC: 13 MMOL/L (ref 3–16)
BASOPHILS ABSOLUTE: 0 K/UL (ref 0–0.2)
BASOPHILS RELATIVE PERCENT: 0.2 %
BUN BLDV-MCNC: 62 MG/DL (ref 7–20)
CALCIUM SERPL-MCNC: 8.5 MG/DL (ref 8.3–10.6)
CHLORIDE BLD-SCNC: 94 MMOL/L (ref 99–110)
CO2: 26 MMOL/L (ref 21–32)
CREAT SERPL-MCNC: 3.9 MG/DL (ref 0.9–1.3)
EOSINOPHILS ABSOLUTE: 0.5 K/UL (ref 0–0.6)
EOSINOPHILS RELATIVE PERCENT: 4.8 %
ESTIMATED AVERAGE GLUCOSE: 194.4 MG/DL
GFR AFRICAN AMERICAN: 20
GFR NON-AFRICAN AMERICAN: 16
GLUCOSE BLD-MCNC: 134 MG/DL (ref 70–99)
GLUCOSE BLD-MCNC: 134 MG/DL (ref 70–99)
GLUCOSE BLD-MCNC: 163 MG/DL (ref 70–99)
GLUCOSE BLD-MCNC: 219 MG/DL (ref 70–99)
GLUCOSE BLD-MCNC: 259 MG/DL (ref 70–99)
HBA1C MFR BLD: 8.4 %
HCT VFR BLD CALC: 32.5 % (ref 40.5–52.5)
HEMOGLOBIN: 11.1 G/DL (ref 13.5–17.5)
LYMPHOCYTES ABSOLUTE: 1.7 K/UL (ref 1–5.1)
LYMPHOCYTES RELATIVE PERCENT: 16.5 %
MCH RBC QN AUTO: 30.5 PG (ref 26–34)
MCHC RBC AUTO-ENTMCNC: 34.1 G/DL (ref 31–36)
MCV RBC AUTO: 89.7 FL (ref 80–100)
MONOCYTES ABSOLUTE: 0.7 K/UL (ref 0–1.3)
MONOCYTES RELATIVE PERCENT: 6.7 %
NEUTROPHILS ABSOLUTE: 7.5 K/UL (ref 1.7–7.7)
NEUTROPHILS RELATIVE PERCENT: 71.8 %
PDW BLD-RTO: 15.2 % (ref 12.4–15.4)
PERFORMED ON: ABNORMAL
PHOSPHORUS: 4.3 MG/DL (ref 2.5–4.9)
PLATELET # BLD: 265 K/UL (ref 135–450)
PMV BLD AUTO: 8.1 FL (ref 5–10.5)
POTASSIUM SERPL-SCNC: 4.1 MMOL/L (ref 3.5–5.1)
RBC # BLD: 3.62 M/UL (ref 4.2–5.9)
SODIUM BLD-SCNC: 133 MMOL/L (ref 136–145)
WBC # BLD: 10.5 K/UL (ref 4–11)

## 2018-11-11 PROCEDURE — 6370000000 HC RX 637 (ALT 250 FOR IP): Performed by: INTERNAL MEDICINE

## 2018-11-11 PROCEDURE — 99233 SBSQ HOSP IP/OBS HIGH 50: CPT | Performed by: INTERNAL MEDICINE

## 2018-11-11 PROCEDURE — 2580000003 HC RX 258: Performed by: INTERNAL MEDICINE

## 2018-11-11 PROCEDURE — 2060000000 HC ICU INTERMEDIATE R&B

## 2018-11-11 PROCEDURE — 6360000002 HC RX W HCPCS: Performed by: INTERNAL MEDICINE

## 2018-11-11 PROCEDURE — 2700000000 HC OXYGEN THERAPY PER DAY

## 2018-11-11 PROCEDURE — 94660 CPAP INITIATION&MGMT: CPT

## 2018-11-11 PROCEDURE — 36415 COLL VENOUS BLD VENIPUNCTURE: CPT

## 2018-11-11 PROCEDURE — 94761 N-INVAS EAR/PLS OXIMETRY MLT: CPT

## 2018-11-11 PROCEDURE — 85025 COMPLETE CBC W/AUTO DIFF WBC: CPT

## 2018-11-11 PROCEDURE — 80069 RENAL FUNCTION PANEL: CPT

## 2018-11-11 RX ORDER — PANTOPRAZOLE SODIUM 40 MG/1
40 TABLET, DELAYED RELEASE ORAL
Status: DISCONTINUED | OUTPATIENT
Start: 2018-11-12 | End: 2018-11-13 | Stop reason: HOSPADM

## 2018-11-11 RX ORDER — POLYETHYLENE GLYCOL 3350 17 G/17G
17 POWDER, FOR SOLUTION ORAL DAILY
Status: DISCONTINUED | OUTPATIENT
Start: 2018-11-11 | End: 2018-11-12 | Stop reason: SDUPTHER

## 2018-11-11 RX ADMIN — OXYCODONE AND ACETAMINOPHEN 1 TABLET: 7.5; 325 TABLET ORAL at 04:36

## 2018-11-11 RX ADMIN — SODIUM CHLORIDE, PRESERVATIVE FREE 10 ML: 5 INJECTION INTRAVENOUS at 22:21

## 2018-11-11 RX ADMIN — CARVEDILOL 12.5 MG: 6.25 TABLET, FILM COATED ORAL at 08:26

## 2018-11-11 RX ADMIN — SODIUM CHLORIDE, PRESERVATIVE FREE 10 ML: 5 INJECTION INTRAVENOUS at 08:29

## 2018-11-11 RX ADMIN — INSULIN LISPRO 3 UNITS: 100 INJECTION, SOLUTION INTRAVENOUS; SUBCUTANEOUS at 21:59

## 2018-11-11 RX ADMIN — OXYCODONE AND ACETAMINOPHEN 1 TABLET: 7.5; 325 TABLET ORAL at 17:21

## 2018-11-11 RX ADMIN — ASPIRIN 81 MG 81 MG: 81 TABLET ORAL at 08:27

## 2018-11-11 RX ADMIN — HEPARIN SODIUM 5000 UNITS: 5000 INJECTION INTRAVENOUS; SUBCUTANEOUS at 22:00

## 2018-11-11 RX ADMIN — POLYETHYLENE GLYCOL 3350 17 G: 17 POWDER, FOR SOLUTION ORAL at 08:27

## 2018-11-11 RX ADMIN — ISOSORBIDE MONONITRATE 30 MG: 30 TABLET, EXTENDED RELEASE ORAL at 08:26

## 2018-11-11 RX ADMIN — SODIUM CHLORIDE, SODIUM LACTATE, CALCIUM CHLORIDE, MAGNESIUM CHLORIDE AND DEXTROSE 2000 ML: 2.5; 538; 448; 18.3; 5.08 INJECTION, SOLUTION INTRAPERITONEAL at 22:02

## 2018-11-11 RX ADMIN — INSULIN LISPRO 3 UNITS: 100 INJECTION, SOLUTION INTRAVENOUS; SUBCUTANEOUS at 17:22

## 2018-11-11 RX ADMIN — PROMETHAZINE HYDROCHLORIDE 25 MG: 25 INJECTION INTRAMUSCULAR; INTRAVENOUS at 11:49

## 2018-11-11 RX ADMIN — PRAVASTATIN SODIUM 80 MG: 80 TABLET ORAL at 08:26

## 2018-11-11 RX ADMIN — ANTACID TABLETS 500 MG: 500 TABLET, CHEWABLE ORAL at 08:25

## 2018-11-11 RX ADMIN — HEPARIN SODIUM 5000 UNITS: 5000 INJECTION INTRAVENOUS; SUBCUTANEOUS at 14:07

## 2018-11-11 RX ADMIN — OXYCODONE AND ACETAMINOPHEN 1 TABLET: 7.5; 325 TABLET ORAL at 08:36

## 2018-11-11 RX ADMIN — CLOPIDOGREL BISULFATE 75 MG: 75 TABLET ORAL at 08:26

## 2018-11-11 RX ADMIN — GABAPENTIN 100 MG: 100 CAPSULE ORAL at 21:54

## 2018-11-11 RX ADMIN — INSULIN GLARGINE 20 UNITS: 100 INJECTION, SOLUTION SUBCUTANEOUS at 22:00

## 2018-11-11 RX ADMIN — CARVEDILOL 12.5 MG: 6.25 TABLET, FILM COATED ORAL at 17:21

## 2018-11-11 RX ADMIN — OXYCODONE AND ACETAMINOPHEN 1 TABLET: 7.5; 325 TABLET ORAL at 21:54

## 2018-11-11 RX ADMIN — CITALOPRAM HYDROBROMIDE 40 MG: 20 TABLET ORAL at 08:26

## 2018-11-11 RX ADMIN — TORSEMIDE 100 MG: 100 TABLET ORAL at 08:26

## 2018-11-11 RX ADMIN — TRAZODONE HYDROCHLORIDE 150 MG: 50 TABLET ORAL at 21:54

## 2018-11-11 RX ADMIN — INSULIN LISPRO 9 UNITS: 100 INJECTION, SOLUTION INTRAVENOUS; SUBCUTANEOUS at 11:28

## 2018-11-11 RX ADMIN — SODIUM CHLORIDE, SODIUM LACTATE, CALCIUM CHLORIDE, MAGNESIUM CHLORIDE AND DEXTROSE 2000 ML: 2.5; 538; 448; 18.3; 5.08 INJECTION, SOLUTION INTRAPERITONEAL at 08:36

## 2018-11-11 RX ADMIN — SODIUM CHLORIDE, SODIUM LACTATE, CALCIUM CHLORIDE, MAGNESIUM CHLORIDE AND DEXTROSE 2000 ML: 2.5; 538; 448; 18.3; 5.08 INJECTION, SOLUTION INTRAPERITONEAL at 12:30

## 2018-11-11 RX ADMIN — LOSARTAN POTASSIUM 50 MG: 25 TABLET, FILM COATED ORAL at 08:26

## 2018-11-11 RX ADMIN — HEPARIN SODIUM 5000 UNITS: 5000 INJECTION INTRAVENOUS; SUBCUTANEOUS at 05:46

## 2018-11-11 RX ADMIN — PROMETHAZINE HYDROCHLORIDE 25 MG: 25 INJECTION INTRAMUSCULAR; INTRAVENOUS at 22:22

## 2018-11-11 RX ADMIN — OXYCODONE AND ACETAMINOPHEN 1 TABLET: 7.5; 325 TABLET ORAL at 12:40

## 2018-11-11 RX ADMIN — SODIUM CHLORIDE, PRESERVATIVE FREE 10 ML: 5 INJECTION INTRAVENOUS at 08:30

## 2018-11-11 RX ADMIN — SODIUM CHLORIDE, SODIUM LACTATE, CALCIUM CHLORIDE, MAGNESIUM CHLORIDE AND DEXTROSE 2000 ML: 2.5; 538; 448; 18.3; 5.08 INJECTION, SOLUTION INTRAPERITONEAL at 17:16

## 2018-11-11 ASSESSMENT — PAIN SCALES - GENERAL
PAINLEVEL_OUTOF10: 8
PAINLEVEL_OUTOF10: 9
PAINLEVEL_OUTOF10: 8
PAINLEVEL_OUTOF10: 8
PAINLEVEL_OUTOF10: 9
PAINLEVEL_OUTOF10: 8

## 2018-11-11 ASSESSMENT — PAIN DESCRIPTION - PAIN TYPE: TYPE: CHRONIC PAIN

## 2018-11-11 ASSESSMENT — PAIN DESCRIPTION - LOCATION: LOCATION: BACK

## 2018-11-11 NOTE — PLAN OF CARE
Problem: Falls - Risk of:  Goal: Will remain free from falls  Will remain free from falls   Outcome: Ongoing  Patient instructed to call if assistance is needed. Call light within reach and patient understands how to use.

## 2018-11-11 NOTE — PROGRESS NOTES
11/11/18 0326   NIV Type   Equipment Type V60   Mode BIPAP   Mask Type Full face mask   Mask Size Medium   Settings/Measurements   Comfort Level Good   Using Accessory Muscles No   IPAP 12 cmH20   EPAP 6 cmH2O   Resp 14   SpO2 95   FiO2  30 %   Vt Exhaled 478 mL   Mask Leak (lpm) 12 lpm   Skin Protection for O2 Device Yes   Breath Sounds   Right Upper Lobe Diminished   Right Middle Lobe Diminished   Right Lower Lobe Diminished   Left Upper Lobe Diminished   Left Lower Lobe Diminished   Alarm Settings   Alarms On Y   Press Low Alarm 6 cmH2O   High Pressure Alarm 30 cmH2O   Delay Alarm 20 sec(s)   Resp Rate Low Alarm 6   High Respiratory Rate 45 br/min

## 2018-11-11 NOTE — PROGRESS NOTES
Department of Internal Medicine  Nephrology Progress Note        SUBJECTIVE:    We are following this patient for ESRD management. The patient was seen and examined; he feels well today with no CP, SOB, nausea or vomiting. ROS: No fever or chills. Social: No family at bedside. Physical Exam:    VITALS:  /78   Pulse 73   Temp 97.7 °F (36.5 °C) (Oral)   Resp 16   Ht 5' 9\" (1.753 m)   Wt 213 lb 12.8 oz (97 kg)   SpO2 94%   BMI 31.57 kg/m²     General appearance: Seems comfortable, no acute distress. Neck: Trachea midline, thyroid normal.   Lungs:  Non labored breathing, CTA to anterior auscultation. Heart:  S1S2 normal, rub or gallop. No peripheral edema. Abdomen: Soft, non-tender, no organomegaly. Skin: No lesions or rashes, warm to touch. DATA:    CBC:   Lab Results   Component Value Date    WBC 10.5 11/11/2018    RBC 3.62 11/11/2018    HGB 11.1 11/11/2018    HCT 32.5 11/11/2018    MCV 89.7 11/11/2018    MCH 30.5 11/11/2018    MCHC 34.1 11/11/2018    RDW 15.2 11/11/2018     11/11/2018    MPV 8.1 11/11/2018     BMP:    Lab Results   Component Value Date     11/11/2018    K 4.1 11/11/2018    K 3.9 11/10/2018    CL 94 11/11/2018    CO2 26 11/11/2018    BUN 62 11/11/2018    LABALBU 3.1 11/11/2018    CREATININE 3.9 11/11/2018    CALCIUM 8.5 11/11/2018    GFRAA 20 11/11/2018    GFRAA >60 05/17/2013    LABGLOM 16 11/11/2018    GLUCOSE 134 11/11/2018       IMPRESSION/RECOMMENDATIONS:      1. ESRD  -continue CAPD regimen, manual exchanges, 6 exchanges/day, 2L 2.5% Dianeal  -s/p 3 hours of hemodialysis yesterday with 3.6 liters of ultrafiltration  -will arrange for another ultrafiltration treatment tomorrow    2. Dyspnea secondary to pulmonary edema    3. COPD, ZAINAB  -pulmonary following    4. HTN  -blood pressure within acceptable range

## 2018-11-11 NOTE — PROGRESS NOTES
Paged on call Nephrologist to specify weather they wanted PD overnight due to patient just returning from HD dialysis. Dr Raciel Grace called this writer back and advised not to do PD overnight due to patient having HD dialysis today.      Electronically signed by Aicha Sterling RN on 11/10/2018 at 8:25 PM

## 2018-11-11 NOTE — PROGRESS NOTES
appearance: No apparent distress, appears stated age and cooperative. HEENT: Pupils equal, round, Conjunctivae/corneas clear. Neck: Supple, with full range of motion. No jugular venous distention. Trachea midline. Respiratory:  Normal respiratory effort. Bibasilar crackles with no overt wheeze . Cardiovascular: Regular rate and rhythm with normal S1/S2 without murmurs, rubs or gallops. Abdomen: Soft,  Mildly distended, minimal tenderness, with normal bowel sounds. Peritoneal catheter in place   Musculoskeletal: No clubbing, cyanosis or edema bilaterally. Skin: Skin color, texture, turgor normal.  No rashes or lesions. Neurologic:  Alert, speech clear with no overt facial droop  Psychiatric: Alert and oriented, thought content appropriate, normal insight  Capillary Refill: Brisk,< 3 seconds   Peripheral Pulses: +2 palpable, equal bilaterally       Labs:   Recent Labs      11/09/18   0255  11/10/18   0426  11/11/18   0734   WBC  15.6*  11.8*  10.5   HGB  12.1*  10.7*  11.1*   HCT  36.1*  30.8*  32.5*   PLT  361  287  265     Recent Labs      11/09/18 0255  11/10/18   0426  11/11/18   0734   NA  137  133*  133*   K  4.2  3.9  4.1   CL  94*  90*  94*   CO2  28  28  26   BUN  78*  89*  62*   CREATININE  4.6*  4.6*  3.9*   CALCIUM  9.3  8.6  8.5   PHOS   --    --   4.3     Recent Labs      11/09/18   0255   AST  16   ALT  29   BILITOT  0.3   ALKPHOS  60     No results for input(s): INR in the last 72 hours.   Recent Labs      11/09/18   0255  11/09/18   1132   TROPONINI  0.03*  0.02*       Urinalysis:      Lab Results   Component Value Date    NITRU Negative 01/12/2018    WBCUA 0-2 01/12/2018    BACTERIA Rare 01/12/2018    RBCUA 3-5 01/12/2018    BLOODU Negative 01/12/2018    SPECGRAV 1.025 01/12/2018    GLUCOSEU 100 01/12/2018       Radiology:  XR CHEST STANDARD (2 VW)   Final Result   Increased pulmonary vascular congestion and interstitial opacities suggest   increase of fluid overload from prior exam.

## 2018-11-12 ENCOUNTER — APPOINTMENT (OUTPATIENT)
Dept: GENERAL RADIOLOGY | Age: 48
DRG: 291 | End: 2018-11-12
Payer: MEDICARE

## 2018-11-12 PROBLEM — I51.89 DIASTOLIC DYSFUNCTION: Status: ACTIVE | Noted: 2018-11-12

## 2018-11-12 PROBLEM — D63.8 ANEMIA OF CHRONIC DISEASE: Status: ACTIVE | Noted: 2018-11-12

## 2018-11-12 LAB
ALBUMIN SERPL-MCNC: 3.2 G/DL (ref 3.4–5)
ANION GAP SERPL CALCULATED.3IONS-SCNC: 9 MMOL/L (ref 3–16)
BUN BLDV-MCNC: 66 MG/DL (ref 7–20)
CALCIUM SERPL-MCNC: 8.4 MG/DL (ref 8.3–10.6)
CHLORIDE BLD-SCNC: 95 MMOL/L (ref 99–110)
CO2: 29 MMOL/L (ref 21–32)
CREAT SERPL-MCNC: 4.2 MG/DL (ref 0.9–1.3)
GFR AFRICAN AMERICAN: 18
GFR NON-AFRICAN AMERICAN: 15
GLUCOSE BLD-MCNC: 118 MG/DL (ref 70–99)
GLUCOSE BLD-MCNC: 160 MG/DL (ref 70–99)
GLUCOSE BLD-MCNC: 184 MG/DL (ref 70–99)
GLUCOSE BLD-MCNC: 224 MG/DL (ref 70–99)
PERFORMED ON: ABNORMAL
PHOSPHORUS: 4 MG/DL (ref 2.5–4.9)
POTASSIUM SERPL-SCNC: 4 MMOL/L (ref 3.5–5.1)
SODIUM BLD-SCNC: 133 MMOL/L (ref 136–145)

## 2018-11-12 PROCEDURE — 90937 HEMODIALYSIS REPEATED EVAL: CPT

## 2018-11-12 PROCEDURE — 94664 DEMO&/EVAL PT USE INHALER: CPT

## 2018-11-12 PROCEDURE — 36415 COLL VENOUS BLD VENIPUNCTURE: CPT

## 2018-11-12 PROCEDURE — 2580000003 HC RX 258: Performed by: INTERNAL MEDICINE

## 2018-11-12 PROCEDURE — 6370000000 HC RX 637 (ALT 250 FOR IP): Performed by: INTERNAL MEDICINE

## 2018-11-12 PROCEDURE — 71045 X-RAY EXAM CHEST 1 VIEW: CPT

## 2018-11-12 PROCEDURE — 94640 AIRWAY INHALATION TREATMENT: CPT

## 2018-11-12 PROCEDURE — 99233 SBSQ HOSP IP/OBS HIGH 50: CPT | Performed by: INTERNAL MEDICINE

## 2018-11-12 PROCEDURE — 2060000000 HC ICU INTERMEDIATE R&B

## 2018-11-12 PROCEDURE — 6370000000 HC RX 637 (ALT 250 FOR IP): Performed by: NURSE PRACTITIONER

## 2018-11-12 PROCEDURE — 94660 CPAP INITIATION&MGMT: CPT

## 2018-11-12 PROCEDURE — 94761 N-INVAS EAR/PLS OXIMETRY MLT: CPT

## 2018-11-12 PROCEDURE — 6360000002 HC RX W HCPCS: Performed by: INTERNAL MEDICINE

## 2018-11-12 PROCEDURE — 2700000000 HC OXYGEN THERAPY PER DAY

## 2018-11-12 PROCEDURE — 80069 RENAL FUNCTION PANEL: CPT

## 2018-11-12 RX ORDER — POLYETHYLENE GLYCOL 3350 17 G/17G
17 POWDER, FOR SOLUTION ORAL DAILY
Status: DISCONTINUED | OUTPATIENT
Start: 2018-11-12 | End: 2018-11-13 | Stop reason: HOSPADM

## 2018-11-12 RX ORDER — IPRATROPIUM BROMIDE AND ALBUTEROL SULFATE 2.5; .5 MG/3ML; MG/3ML
1 SOLUTION RESPIRATORY (INHALATION)
Status: DISCONTINUED | OUTPATIENT
Start: 2018-11-12 | End: 2018-11-13 | Stop reason: HOSPADM

## 2018-11-12 RX ADMIN — CARVEDILOL 12.5 MG: 6.25 TABLET, FILM COATED ORAL at 11:56

## 2018-11-12 RX ADMIN — CITALOPRAM HYDROBROMIDE 40 MG: 20 TABLET ORAL at 11:56

## 2018-11-12 RX ADMIN — IPRATROPIUM BROMIDE AND ALBUTEROL SULFATE 1 AMPULE: .5; 3 SOLUTION RESPIRATORY (INHALATION) at 11:51

## 2018-11-12 RX ADMIN — POLYETHYLENE GLYCOL (3350) 17 G: 17 POWDER, FOR SOLUTION ORAL at 22:26

## 2018-11-12 RX ADMIN — TORSEMIDE 100 MG: 100 TABLET ORAL at 11:55

## 2018-11-12 RX ADMIN — HEPARIN SODIUM 3800 UNITS: 1000 INJECTION INTRAVENOUS; SUBCUTANEOUS at 10:38

## 2018-11-12 RX ADMIN — CARVEDILOL 12.5 MG: 6.25 TABLET, FILM COATED ORAL at 17:40

## 2018-11-12 RX ADMIN — ANTACID TABLETS 500 MG: 500 TABLET, CHEWABLE ORAL at 01:57

## 2018-11-12 RX ADMIN — IPRATROPIUM BROMIDE AND ALBUTEROL SULFATE 1 AMPULE: .5; 3 SOLUTION RESPIRATORY (INHALATION) at 19:34

## 2018-11-12 RX ADMIN — OXYCODONE AND ACETAMINOPHEN 1 TABLET: 7.5; 325 TABLET ORAL at 06:01

## 2018-11-12 RX ADMIN — SODIUM CHLORIDE, SODIUM LACTATE, CALCIUM CHLORIDE, MAGNESIUM CHLORIDE AND DEXTROSE 2000 ML: 2.5; 538; 448; 18.3; 5.08 INJECTION, SOLUTION INTRAPERITONEAL at 05:48

## 2018-11-12 RX ADMIN — INSULIN LISPRO 6 UNITS: 100 INJECTION, SOLUTION INTRAVENOUS; SUBCUTANEOUS at 17:37

## 2018-11-12 RX ADMIN — PRAVASTATIN SODIUM 80 MG: 80 TABLET ORAL at 11:56

## 2018-11-12 RX ADMIN — OXYCODONE AND ACETAMINOPHEN 1 TABLET: 7.5; 325 TABLET ORAL at 22:08

## 2018-11-12 RX ADMIN — SODIUM CHLORIDE, PRESERVATIVE FREE 10 ML: 5 INJECTION INTRAVENOUS at 21:58

## 2018-11-12 RX ADMIN — SODIUM CHLORIDE, SODIUM LACTATE, CALCIUM CHLORIDE, MAGNESIUM CHLORIDE AND DEXTROSE 2000 ML: 2.5; 538; 448; 18.3; 5.08 INJECTION, SOLUTION INTRAPERITONEAL at 21:58

## 2018-11-12 RX ADMIN — CLOPIDOGREL BISULFATE 75 MG: 75 TABLET ORAL at 11:55

## 2018-11-12 RX ADMIN — ASPIRIN 81 MG 81 MG: 81 TABLET ORAL at 11:56

## 2018-11-12 RX ADMIN — INSULIN GLARGINE 20 UNITS: 100 INJECTION, SOLUTION SUBCUTANEOUS at 21:58

## 2018-11-12 RX ADMIN — TRAZODONE HYDROCHLORIDE 150 MG: 50 TABLET ORAL at 21:58

## 2018-11-12 RX ADMIN — SODIUM CHLORIDE, SODIUM LACTATE, CALCIUM CHLORIDE, MAGNESIUM CHLORIDE AND DEXTROSE 2000 ML: 2.5; 538; 448; 18.3; 5.08 INJECTION, SOLUTION INTRAPERITONEAL at 01:28

## 2018-11-12 RX ADMIN — IPRATROPIUM BROMIDE AND ALBUTEROL SULFATE 1 AMPULE: .5; 3 SOLUTION RESPIRATORY (INHALATION) at 15:56

## 2018-11-12 RX ADMIN — OXYCODONE AND ACETAMINOPHEN 1 TABLET: 7.5; 325 TABLET ORAL at 17:40

## 2018-11-12 RX ADMIN — SODIUM CHLORIDE, SODIUM LACTATE, CALCIUM CHLORIDE, MAGNESIUM CHLORIDE AND DEXTROSE 2000 ML: 2.5; 538; 448; 18.3; 5.08 INJECTION, SOLUTION INTRAPERITONEAL at 13:19

## 2018-11-12 RX ADMIN — PROMETHAZINE HYDROCHLORIDE 25 MG: 25 INJECTION INTRAMUSCULAR; INTRAVENOUS at 17:38

## 2018-11-12 RX ADMIN — SODIUM CHLORIDE, SODIUM LACTATE, CALCIUM CHLORIDE, MAGNESIUM CHLORIDE AND DEXTROSE 2000 ML: 2.5; 538; 448; 18.3; 5.08 INJECTION, SOLUTION INTRAPERITONEAL at 17:38

## 2018-11-12 RX ADMIN — HEPARIN SODIUM 5000 UNITS: 5000 INJECTION INTRAVENOUS; SUBCUTANEOUS at 21:57

## 2018-11-12 RX ADMIN — HEPARIN SODIUM 5000 UNITS: 5000 INJECTION INTRAVENOUS; SUBCUTANEOUS at 13:19

## 2018-11-12 RX ADMIN — SODIUM CHLORIDE, PRESERVATIVE FREE 10 ML: 5 INJECTION INTRAVENOUS at 11:57

## 2018-11-12 RX ADMIN — PANTOPRAZOLE SODIUM 40 MG: 40 TABLET, DELAYED RELEASE ORAL at 06:01

## 2018-11-12 RX ADMIN — INSULIN LISPRO 2 UNITS: 100 INJECTION, SOLUTION INTRAVENOUS; SUBCUTANEOUS at 21:58

## 2018-11-12 RX ADMIN — OXYCODONE AND ACETAMINOPHEN 1 TABLET: 7.5; 325 TABLET ORAL at 11:55

## 2018-11-12 RX ADMIN — POLYETHYLENE GLYCOL 3350 17 G: 17 POWDER, FOR SOLUTION ORAL at 11:55

## 2018-11-12 RX ADMIN — HEPARIN SODIUM 5000 UNITS: 5000 INJECTION INTRAVENOUS; SUBCUTANEOUS at 05:48

## 2018-11-12 RX ADMIN — LOSARTAN POTASSIUM 50 MG: 25 TABLET, FILM COATED ORAL at 11:56

## 2018-11-12 RX ADMIN — GABAPENTIN 100 MG: 100 CAPSULE ORAL at 21:58

## 2018-11-12 RX ADMIN — OXYCODONE AND ACETAMINOPHEN 1 TABLET: 7.5; 325 TABLET ORAL at 01:57

## 2018-11-12 RX ADMIN — ISOSORBIDE MONONITRATE 30 MG: 30 TABLET, EXTENDED RELEASE ORAL at 11:56

## 2018-11-12 ASSESSMENT — PAIN SCALES - GENERAL
PAINLEVEL_OUTOF10: 6
PAINLEVEL_OUTOF10: 10
PAINLEVEL_OUTOF10: 9
PAINLEVEL_OUTOF10: 9
PAINLEVEL_OUTOF10: 8
PAINLEVEL_OUTOF10: 9
PAINLEVEL_OUTOF10: 8
PAINLEVEL_OUTOF10: 10
PAINLEVEL_OUTOF10: 10
PAINLEVEL_OUTOF10: 8
PAINLEVEL_OUTOF10: 8

## 2018-11-12 ASSESSMENT — PAIN DESCRIPTION - PAIN TYPE
TYPE: CHRONIC PAIN

## 2018-11-12 ASSESSMENT — PAIN DESCRIPTION - LOCATION
LOCATION: BACK
LOCATION: BACK;HIP
LOCATION: BACK

## 2018-11-12 NOTE — PROGRESS NOTES
Range: 3 - 16     9   GFR Non- Latest Ref Range: >60     15 (A)   GFR  Latest Ref Range: >60     18 (A)   Glucose Latest Ref Range: 70 - 99 mg/dL    160 (H)   POC Glucose Latest Ref Range: 70 - 99 mg/dl 259 (H) 163 (H) 219 (H)    Calcium Latest Ref Range: 8.3 - 10.6 mg/dL    8.4   Phosphorus Latest Ref Range: 2.5 - 4.9 mg/dL    4.0     Results for Garcia Hopson (MRN [id number]) as of 11/12/2018 09:00   Ref. Range 9/22/2018 05:07 11/9/2018 02:55 11/10/2018 04:26 11/11/2018 07:34   WBC Latest Ref Range: 4.0 - 11.0 K/uL 9.3 15.6 (H) 11.8 (H) 10.5   RBC Latest Ref Range: 4.20 - 5.90 M/uL 2.96 (L) 4.04 (L) 3.51 (L) 3.62 (L)   Hemoglobin Quant Latest Ref Range: 13.5 - 17.5 g/dL 9.0 (L) 12.1 (L) 10.7 (L) 11.1 (L)   Hematocrit Latest Ref Range: 40.5 - 52.5 % 26.9 (L) 36.1 (L) 30.8 (L) 32.5 (L)   MCV Latest Ref Range: 80.0 - 100.0 fL 91.0 89.4 87.9 89.7   MCH Latest Ref Range: 26.0 - 34.0 pg 30.4 30.0 30.5 30.5   MCHC Latest Ref Range: 31.0 - 36.0 g/dL 33.4 33.6 34.7 34.1   MPV Latest Ref Range: 5.0 - 10.5 fL 8.8 7.7 8.1 8.1   RDW Latest Ref Range: 12.4 - 15.4 % 14.7 15.5 (H) 15.2 15.2   Platelet Count Latest Ref Range: 135 - 450 K/uL 134 (L) 361 287 265   Neutrophils % Latest Units: % 77.0 89.2 85.9 71.8   Lymphocyte % Latest Units: % 13.0 5.3 7.8 16.5   Monocytes % Latest Units: % 7.0 4.8 6.1 6.7   Eosinophils % Latest Units: % 2.2 0.4 0.2 4.8     Results for Garcia Hopson (MRN [id number]) as of 11/12/2018 09:00   Ref.  Range 11/3/2017 01:55 11/3/2017 05:59 11/3/2017 11:41 11/4/2017 05:35 11/5/2017 06:00   O2 Therapy Unknown    Unknown Unknown   Hemoglobin, Art, Extended Latest Ref Range: 13.5 - 17.5 g/dL    9.3 (L) 8.9 (L)   pH, Arterial Latest Ref Range: 7.350 - 7.450  7.233 (L) 7.264 (L)  7.239 (L) 7.339 (L)   pCO2, Arterial Latest Ref Range: 35.0 - 45.0 mmHg 49 (H) 48 (H)  51.9 (H) 43.6   pO2, Arterial Latest Ref Range: 75.0 - 108.0 mmHg 77 86  43.0 (L) 45.4 (L)   HCO3, Arterial

## 2018-11-12 NOTE — PROGRESS NOTES
From PD exchange 2,275 out, clear, pale, yellow.   [facility]  11/12/2018

## 2018-11-12 NOTE — FLOWSHEET NOTE
Treatment time: 3 hours  Net UF: 4000 ml     Pre weight: 98.9 kg (standing scale)  Post weight: 94.4 kg (same)  EDW: 92.2 kg      Access used: RIJ TDC  Access function: Good     Medications or blood products given: Heparin for HD cath dwells     Regular outpatient schedule: PD     Summary of response to treatment:   Tolerated and completed tx. Report called to Digna.   Copy of dialysis treatment record placed in chart, to be scanned into EMR.     11/12/18 0601 11/12/18 0753 11/12/18 1110   Vital Signs   BP --  124/84 132/69   Temp --  97.8 °F (36.6 °C) 97.7 °F (36.5 °C)   Weight 218 lb 3.2 oz (99 kg) --  208 lb 1.8 oz (94.4 kg)   Weight Method Actual;Standing scale --  Actual;Standing scale   Dry Weight 203 lb 4.2 oz (92.2 kg) --  --

## 2018-11-12 NOTE — PLAN OF CARE
Problem: OXYGENATION/RESPIRATORY FUNCTION  Goal: Patient will maintain patent airway  Outcome: Ongoing  Patient's EF (Ejection Fraction) is greater than 40%    Patient has a past medical history of Aortic stenosis; Arthritis; Asthma; Bilateral hilar adenopathy syndrome; CAD (coronary artery disease); CHF (congestive heart failure) ([facility] Chronic renal failure; COPD (chronic obstructive pulmonary disease) ([facility]); CRF (chronic renal failure); Depression; Diabetes mellitus ([facility] Emphysema of lung ([facility] Gastric ulcer; GERD (gastroesophageal reflux disease); Heart valve problem; Hemodialysis patient [facility]); Hyperlipidemia; Hypertension; Neuromuscular disorder ([facility] Numbness and tingling in left arm; Pneumonia; PONV (postoperative nausea and vomiting); Prolonged emergence from general anesthesia; Sleep apnea; Stroke [facility]); TIA (transient ischemic attack); and Unspecified diseases of blood and blood-forming organs. Comorbidities reviewed and education provided. Patient and family's stated goal of care: reduce shortness of breath prior to discharge    Patient's current functional capacity:  Slight limitation of physical activity. Comfortable at rest. Ordinary physical activity results in fatigue, palpitation, dyspnea. Pt resting in bed at this time on room air. Pt denies shortness of breath. Pt with nonpitting lower extremity edema. Patient's weights and intake/output reviewed:    Patient Vitals for the past 96 hrs (Last 3 readings):   Weight   11/11/18 0436 213 lb 12.8 oz (97 kg)   11/10/18 1713 208 lb 8.9 oz (94.6 kg)   11/10/18 1427 221 lb 9 oz (100.5 kg)       Intake/Output Summary (Last 24 hours) at 11/12/18 0225  Last data filed at 11/12/18 0151   Gross per 24 hour   Intake            01478 ml   Output            47933 ml   Net              850 ml       Patient provided with education on CHF signs/symptoms, causes, discharge medications, daily weights, low sodium diet, activity, and follow-up.  Notified patient

## 2018-11-12 NOTE — CARE COORDINATION
11/12/18 Referral made to [facility] for OP HD at [facility] location. Pt requesting MWF.

## 2018-11-12 NOTE — PROGRESS NOTES
BP (!) 145/77   Pulse 68   Temp 97.8 °F (36.6 °C) (Oral)   Resp 18   Ht 5' 9\" (1.753 m)   Wt 218 lb 3.2 oz (99 kg)   SpO2 93%   BMI 32.22 kg/m²   On room air. Pt resting quietly in bed. Pt denies shortness of breath at this time. Pt complains of chronic back/hip pain \"8\", see MAR for prn percocet given at 601. Transport at bedside. Pt being transported to dialysis. CMU made aware, batteries replaced in tele box.   [facility]  11/12/2018

## 2018-11-12 NOTE — PROGRESS NOTES
BP (!) 141/85   Pulse 75   Temp 97.8 °F (36.6 °C) (Oral)   Resp 18   Ht 5' 9\" (1.753 m)   Wt 208 lb 1.8 oz (94.4 kg)   SpO2 97%   BMI 30.73 kg/m²  on room air. Pt complains of chronic back pain \"10\", prn percocet given per pt request.  Intermittent dyspnea at rest and with exertion. Lungs diminished. NSR on tele. Pt denies any other needs at this time. Bedside table and call light within reach. Pt instructed to call out for needs and assistance. Will continue to monitor.   [facility]  11/12/2018

## 2018-11-12 NOTE — PROGRESS NOTES
kg/m²     General appearance: No apparent distress, appears stated age and cooperative. HEENT: Pupils equal, round, Conjunctivae/corneas clear. Neck: Supple, with full range of motion. No jugular venous distention. Trachea midline. Respiratory:  Normal respiratory effort. Bibasilar crackles with no overt wheeze . Cardiovascular: Regular rate and rhythm with normal S1/S2 without murmurs, rubs or gallops. Abdomen: Soft,  Mildly distended, minimal tenderness, with normal bowel sounds. Peritoneal catheter in place   Musculoskeletal: No clubbing, cyanosis or edema bilaterally. Skin: Skin color, texture, turgor normal.  No rashes or lesions. Neurologic:  Alert, speech clear with no overt facial droop  Psychiatric: Alert and oriented, thought content appropriate, normal insight  Capillary Refill: Brisk,< 3 seconds   Peripheral Pulses: +2 palpable, equal bilaterally       Labs:   Recent Labs      11/10/18   0426  11/11/18   0734   WBC  11.8*  10.5   HGB  10.7*  11.1*   HCT  30.8*  32.5*   PLT  287  265     Recent Labs      11/10/18   0426  11/11/18   0734  11/12/18   0440   NA  133*  133*  133*   K  3.9  4.1  4.0   CL  90*  94*  95*   CO2  28  26  29   BUN  89*  62*  66*   CREATININE  4.6*  3.9*  4.2*   CALCIUM  8.6  8.5  8.4   PHOS   --   4.3  4.0     No results for input(s): AST, ALT, BILIDIR, BILITOT, ALKPHOS in the last 72 hours. No results for input(s): INR in the last 72 hours.   Recent Labs      11/09/18   1132   TROPONINI  0.02*       Urinalysis:      Lab Results   Component Value Date    NITRU Negative 01/12/2018    WBCUA 0-2 01/12/2018    BACTERIA Rare 01/12/2018    RBCUA 3-5 01/12/2018    BLOODU Negative 01/12/2018    SPECGRAV 1.025 01/12/2018    GLUCOSEU 100 01/12/2018       Radiology:  XR CHEST STANDARD (2 VW)   Final Result   Increased pulmonary vascular congestion and interstitial opacities suggest   increase of fluid overload from prior exam.         XR CHEST PORTABLE    (Results Pending)

## 2018-11-13 ENCOUNTER — TELEPHONE (OUTPATIENT)
Dept: PULMONOLOGY | Age: 48
End: 2018-11-13

## 2018-11-13 VITALS
HEART RATE: 75 BPM | WEIGHT: 207.5 LBS | OXYGEN SATURATION: 95 % | BODY MASS INDEX: 30.73 KG/M2 | RESPIRATION RATE: 18 BRPM | DIASTOLIC BLOOD PRESSURE: 68 MMHG | SYSTOLIC BLOOD PRESSURE: 120 MMHG | TEMPERATURE: 98.3 F | HEIGHT: 69 IN

## 2018-11-13 LAB
ALBUMIN SERPL-MCNC: 3.2 G/DL (ref 3.4–5)
ANION GAP SERPL CALCULATED.3IONS-SCNC: 11 MMOL/L (ref 3–16)
BUN BLDV-MCNC: 37 MG/DL (ref 7–20)
CALCIUM SERPL-MCNC: 8.4 MG/DL (ref 8.3–10.6)
CHLORIDE BLD-SCNC: 93 MMOL/L (ref 99–110)
CO2: 27 MMOL/L (ref 21–32)
CREAT SERPL-MCNC: 3.5 MG/DL (ref 0.9–1.3)
GFR AFRICAN AMERICAN: 22
GFR NON-AFRICAN AMERICAN: 19
GLUCOSE BLD-MCNC: 120 MG/DL (ref 70–99)
GLUCOSE BLD-MCNC: 169 MG/DL (ref 70–99)
GLUCOSE BLD-MCNC: 207 MG/DL (ref 70–99)
HCT VFR BLD CALC: 37.8 % (ref 40.5–52.5)
HEMOGLOBIN: 12.3 G/DL (ref 13.5–17.5)
MCH RBC QN AUTO: 29.4 PG (ref 26–34)
MCHC RBC AUTO-ENTMCNC: 32.6 G/DL (ref 31–36)
MCV RBC AUTO: 90.4 FL (ref 80–100)
PDW BLD-RTO: 16 % (ref 12.4–15.4)
PERFORMED ON: ABNORMAL
PERFORMED ON: ABNORMAL
PHOSPHORUS: 3.1 MG/DL (ref 2.5–4.9)
PLATELET # BLD: 246 K/UL (ref 135–450)
PMV BLD AUTO: 8 FL (ref 5–10.5)
POTASSIUM REFLEX MAGNESIUM: 3.9 MMOL/L (ref 3.5–5.1)
POTASSIUM SERPL-SCNC: 3.9 MMOL/L (ref 3.5–5.1)
RBC # BLD: 4.18 M/UL (ref 4.2–5.9)
SODIUM BLD-SCNC: 131 MMOL/L (ref 136–145)
WBC # BLD: 12.9 K/UL (ref 4–11)

## 2018-11-13 PROCEDURE — 85027 COMPLETE CBC AUTOMATED: CPT

## 2018-11-13 PROCEDURE — 80069 RENAL FUNCTION PANEL: CPT

## 2018-11-13 PROCEDURE — 6370000000 HC RX 637 (ALT 250 FOR IP): Performed by: INTERNAL MEDICINE

## 2018-11-13 PROCEDURE — 2580000003 HC RX 258: Performed by: INTERNAL MEDICINE

## 2018-11-13 PROCEDURE — 6360000002 HC RX W HCPCS: Performed by: INTERNAL MEDICINE

## 2018-11-13 PROCEDURE — 6370000000 HC RX 637 (ALT 250 FOR IP): Performed by: NURSE PRACTITIONER

## 2018-11-13 PROCEDURE — 99232 SBSQ HOSP IP/OBS MODERATE 35: CPT | Performed by: INTERNAL MEDICINE

## 2018-11-13 PROCEDURE — 36415 COLL VENOUS BLD VENIPUNCTURE: CPT

## 2018-11-13 PROCEDURE — 94640 AIRWAY INHALATION TREATMENT: CPT

## 2018-11-13 RX ORDER — CARVEDILOL 12.5 MG/1
12.5 TABLET ORAL 2 TIMES DAILY WITH MEALS
Qty: 60 TABLET | Refills: 3 | Status: SHIPPED | OUTPATIENT
Start: 2018-11-13 | End: 2019-05-30 | Stop reason: SDUPTHER

## 2018-11-13 RX ORDER — PANTOPRAZOLE SODIUM 40 MG/1
40 TABLET, DELAYED RELEASE ORAL
Qty: 30 TABLET | Refills: 3 | Status: SHIPPED | OUTPATIENT
Start: 2018-11-14 | End: 2019-03-28 | Stop reason: SDUPTHER

## 2018-11-13 RX ORDER — LOSARTAN POTASSIUM 50 MG/1
50 TABLET ORAL DAILY
Qty: 30 TABLET | Refills: 3 | Status: SHIPPED | OUTPATIENT
Start: 2018-11-14 | End: 2019-05-30 | Stop reason: SDUPTHER

## 2018-11-13 RX ORDER — INSULIN GLARGINE 100 [IU]/ML
25 INJECTION, SOLUTION SUBCUTANEOUS NIGHTLY
Status: DISCONTINUED | OUTPATIENT
Start: 2018-11-13 | End: 2018-11-13 | Stop reason: HOSPADM

## 2018-11-13 RX ADMIN — SODIUM CHLORIDE, SODIUM LACTATE, CALCIUM CHLORIDE, MAGNESIUM CHLORIDE AND DEXTROSE 2000 ML: 2.5; 538; 448; 18.3; 5.08 INJECTION, SOLUTION INTRAPERITONEAL at 01:47

## 2018-11-13 RX ADMIN — SODIUM CHLORIDE, SODIUM LACTATE, CALCIUM CHLORIDE, MAGNESIUM CHLORIDE AND DEXTROSE 2000 ML: 2.5; 538; 448; 18.3; 5.08 INJECTION, SOLUTION INTRAPERITONEAL at 10:02

## 2018-11-13 RX ADMIN — OXYCODONE AND ACETAMINOPHEN 1 TABLET: 7.5; 325 TABLET ORAL at 06:25

## 2018-11-13 RX ADMIN — OXYCODONE AND ACETAMINOPHEN 1 TABLET: 7.5; 325 TABLET ORAL at 10:25

## 2018-11-13 RX ADMIN — PRAVASTATIN SODIUM 80 MG: 80 TABLET ORAL at 08:53

## 2018-11-13 RX ADMIN — PANTOPRAZOLE SODIUM 40 MG: 40 TABLET, DELAYED RELEASE ORAL at 06:25

## 2018-11-13 RX ADMIN — IPRATROPIUM BROMIDE AND ALBUTEROL SULFATE 1 AMPULE: .5; 3 SOLUTION RESPIRATORY (INHALATION) at 07:17

## 2018-11-13 RX ADMIN — SODIUM CHLORIDE, SODIUM LACTATE, CALCIUM CHLORIDE, MAGNESIUM CHLORIDE AND DEXTROSE 2000 ML: 2.5; 538; 448; 18.3; 5.08 INJECTION, SOLUTION INTRAPERITONEAL at 06:26

## 2018-11-13 RX ADMIN — HEPARIN SODIUM 5000 UNITS: 5000 INJECTION INTRAVENOUS; SUBCUTANEOUS at 06:25

## 2018-11-13 RX ADMIN — INSULIN LISPRO 6 UNITS: 100 INJECTION, SOLUTION INTRAVENOUS; SUBCUTANEOUS at 08:57

## 2018-11-13 RX ADMIN — TORSEMIDE 100 MG: 100 TABLET ORAL at 08:52

## 2018-11-13 RX ADMIN — IPRATROPIUM BROMIDE AND ALBUTEROL SULFATE 1 AMPULE: .5; 3 SOLUTION RESPIRATORY (INHALATION) at 11:26

## 2018-11-13 RX ADMIN — POLYETHYLENE GLYCOL (3350) 17 G: 17 POWDER, FOR SOLUTION ORAL at 08:51

## 2018-11-13 RX ADMIN — ANTACID TABLETS 500 MG: 500 TABLET, CHEWABLE ORAL at 08:53

## 2018-11-13 RX ADMIN — CLOPIDOGREL BISULFATE 75 MG: 75 TABLET ORAL at 08:52

## 2018-11-13 RX ADMIN — CARVEDILOL 12.5 MG: 6.25 TABLET, FILM COATED ORAL at 08:53

## 2018-11-13 RX ADMIN — LOSARTAN POTASSIUM 50 MG: 25 TABLET, FILM COATED ORAL at 08:52

## 2018-11-13 RX ADMIN — ASPIRIN 81 MG 81 MG: 81 TABLET ORAL at 08:53

## 2018-11-13 RX ADMIN — CITALOPRAM HYDROBROMIDE 40 MG: 20 TABLET ORAL at 08:52

## 2018-11-13 RX ADMIN — ISOSORBIDE MONONITRATE 30 MG: 30 TABLET, EXTENDED RELEASE ORAL at 08:53

## 2018-11-13 RX ADMIN — SODIUM CHLORIDE, PRESERVATIVE FREE 10 ML: 5 INJECTION INTRAVENOUS at 08:53

## 2018-11-13 RX ADMIN — OXYCODONE AND ACETAMINOPHEN 1 TABLET: 7.5; 325 TABLET ORAL at 02:16

## 2018-11-13 ASSESSMENT — PAIN SCALES - GENERAL
PAINLEVEL_OUTOF10: 8
PAINLEVEL_OUTOF10: 8
PAINLEVEL_OUTOF10: 9
PAINLEVEL_OUTOF10: 10

## 2018-11-13 ASSESSMENT — PAIN DESCRIPTION - LOCATION: LOCATION: BACK

## 2018-11-13 ASSESSMENT — PAIN DESCRIPTION - PAIN TYPE: TYPE: CHRONIC PAIN

## 2018-11-13 NOTE — PROGRESS NOTES
11/12/18 4904   NIV Type   $NIV $Daily Charge   Equipment Type v60   Mode BIPAP   Mask Type Full face mask   Mask Size Medium   Settings/Measurements   Using Accessory Muscles Yes   IPAP 12 cmH20   EPAP 6 cmH2O   Rate Ordered 10   Resp 13   FiO2  30 %   Vt Exhaled 633 mL   Mask Leak (lpm) 12 lpm   Alarm Settings   Alarms On Y   Press Low Alarm 6 cmH2O   High Pressure Alarm 30 cmH2O   Delay Alarm 20 sec(s)   Resp Rate Low Alarm 6   High Respiratory Rate 45 br/min

## 2018-11-13 NOTE — PROGRESS NOTES
Patient discharged to home on this date at 1330. Patient set up for outpatient hemodialysis on 11/15/18. Writer attempted to schedule follow up appointment with Pulmonology for patient ut patient declined. Number given to patient and patient states that he will schedule the appointment himself. Writer escorted patient to outpatient pharmacy and then to await for ride with han.

## 2018-11-13 NOTE — PROGRESS NOTES
exam:->dyspnea Ordering Physician Provided Reason for Exam: worsening dyspnea lasting 1 wk Relevant Medical/Surgical History: CHF FINDINGS: The heart is borderline enlarged but stable. There is mild ill definition of the pulmonary vascularity. There is subtle patchy airspace disease in the left lung base. There are no pleural effusions. There is a right internal jugular dialysis catheter in place. Borderline cardiomegaly and pulmonary venous congestion. Xr Chest Portable    Result Date: 11/12/2018  EXAMINATION: SINGLE XRAY VIEW OF THE CHEST 11/12/2018 5:56 am COMPARISON: 11/09/2018 HISTORY: ORDERING SYSTEM PROVIDED HISTORY: resp failure, pulm edema TECHNOLOGIST PROVIDED HISTORY: Reason for exam:->resp failure, pulm edema Ordering Physician Provided Reason for Exam: resp failure, pulm edema Type of Exam: Subsequent/Follow-up FINDINGS: Right internal jugular central venous catheter is seen. Prominence of pulmonary vascularity again noted. No evidence of significant pleural effusion. Negative for pneumothorax. Cardiac and mediastinal silhouettes are unchanged. Persistent pulmonary vascular congestion. Results for Ana María Zuleta (MRN [id number]) as of 11/13/2018 11:37   Ref.  Range 11/12/2018 04:40 11/12/2018 11:50 11/12/2018 17:12 11/12/2018 21:20 11/13/2018 04:25   Sodium Latest Ref Range: 136 - 145 mmol/L 133 (L)    131 (L)   Potassium Latest Ref Range: 3.5 - 5.1 mmol/L 4.0    3.9   Chloride Latest Ref Range: 99 - 110 mmol/L 95 (L)    93 (L)   CO2 Latest Ref Range: 21 - 32 mmol/L 29    27   BUN Latest Ref Range: 7 - 20 mg/dL 66 (H)    37 (H)   Creatinine Latest Ref Range: 0.9 - 1.3 mg/dL 4.2 (H)    3.5 (H)   Anion Gap Latest Ref Range: 3 - 16  9    11   GFR Non- Latest Ref Range: >60  15 (A)    19 (A)   GFR  Latest Ref Range: >60  18 (A)    22 (A)   Glucose Latest Ref Range: 70 - 99 mg/dL 160 (H)    169 (H)   POC Glucose Latest Ref Range: 70 - 99 mg/dl  118

## 2018-11-13 NOTE — DISCHARGE SUMMARY
ESRD : on peritoneal dialysis . Has dialysis catheter from previous admission. S/p HD on 11/10 and 11/12. Continue fluid removal per nephro     Elevated trop : chronic per chart review likely related to ESRD. Had normal stress dobutamine echo in 9/2018. Denied any chest pain. ACS unlikely. Trop downtrended     IDDM:  BG improved- continue insulin regimen     COPD: stable, does not seem in acute exacerbation as no wheezing currently. Continue bronchodilators PRN.    CAD : denied any chest pain. Continue aspirin, plavix, imdur  NTG PRN      ZAINAB: home CPAP faulty and has not been using it. SW to assist with getting new device at d/c with outpatient f/u with pulm .      Hyperlipidemia: continue statin      Prolonged QTc on EKG : mag normal. QTc 497. Avoid QTc prolonging meds including zofran      GERD : started on protonix         Physical Exam Performed:     /68   Pulse 75   Temp 98.3 °F (36.8 °C) (Oral)   Resp 18   Ht 5' 9\" (1.753 m)   Wt 207 lb 8 oz (94.1 kg)   SpO2 94%   BMI 30.64 kg/m²       General appearance:  No apparent distress, appears stated age and cooperative. HEENT:  Normal cephalic, atraumatic without obvious deformity. Pupils equal, round, and reactive to light. Extra ocular muscles intact. Conjunctivae/corneas clear. Neck: Supple, with full range of motion. No jugular venous distention. Trachea midline. Respiratory:  Normal respiratory effort. Clear to auscultation, bilaterally without Rales/Wheezes/Rhonchi. Cardiovascular:  Regular rate and rhythm with normal S1/S2 without murmurs, rubs or gallops. Abdomen: Soft, non-tender, non-distended with normal bowel sounds. PD catheter in place. Musculoskeletal:  No clubbing, cyanosis or edema bilaterally. Full range of motion without deformity. Skin: Skin color, texture, turgor normal.  No rashes or lesions. Neurologic:  Neurovascularly intact without any focal sensory/motor deficits.  Cranial nerves: II-XII intact, grossly DIRECTED  Qty: 300 each, Refills: 3      Blood Glucose Monitoring Suppl ADAM USE AS DIRECTED. Qty: 1 Device, Refills: 0    Comments: WITH CONTROL SOLUTION. Alcohol Swabs PADS USE AS DIRECTED  Qty: 300 each, Refills: 3      ipratropium-albuterol (DUONEB) 0.5-2.5 (3) MG/3ML SOLN nebulizer solution Inhale 3 mLs into the lungs every 6 hours as needed for Shortness of Breath  Qty: 360 mL, Refills: 1      !! glucose blood VI test strips (FREESTYLE LITE) strip Daily As needed. Qty: 100 strip, Refills: 3      glucose monitoring kit (FREESTYLE) monitoring kit 1 kit by Does not apply route daily  Qty: 1 kit, Refills: 0      !! Lancets MISC Test daily  Qty: 100 each, Refills: 3      nitroGLYCERIN (NITROSTAT) 0.4 MG SL tablet Place 1 tablet under the tongue every 5 minutes as needed for Chest pain  Qty: 25 tablet, Refills: 0      calcium carbonate (TUMS) 500 MG chewable tablet Take 1 tablet by mouth 3 times daily as needed for Heartburn. !! - Potential duplicate medications found. Please discuss with provider. Time Spent on discharge is more than 30 minutes in the examination, evaluation, counseling and review of medications and discharge plan. Signed:    Tsering Richardson MD   11/13/2018      Thank you JAY Sanchez - ILAN for the opportunity to be involved in this patient's care. If you have any questions or concerns please feel free to contact me at [phone].

## 2018-11-13 NOTE — PROGRESS NOTES
Hospitalist Progress Note      PCP: JAY Oleary - CNP    Date of Admission: 11/9/2018    Chief Complaint: SOB     Hospital Course: admitted with acute resp failure due to volume overload. Pt with hx of ESRD on peritoneal dialysis. Subjective:   Lost 11 lbs since yesterday. Feels better.        Medications:  Reviewed    Infusion Medications    dextrose       Scheduled Medications    ipratropium-albuterol  1 ampule Inhalation Q4H WA    polyethylene glycol  17 g Oral Daily    pantoprazole  40 mg Oral QAM AC    dianeal lo-trever 2.5%  2,000 mL Intraperitoneal Q4H    insulin glargine  20 Units Subcutaneous Nightly    carvedilol  12.5 mg Oral BID WC    losartan  50 mg Oral Daily    aspirin  81 mg Oral Daily    citalopram  40 mg Oral Daily    clopidogrel  75 mg Oral Daily    isosorbide mononitrate  30 mg Oral Daily    pravastatin  80 mg Oral Daily    torsemide  100 mg Oral Daily    traZODone  150 mg Oral Nightly    sodium chloride flush  10 mL Intravenous 2 times per day    heparin (porcine)  5,000 Units Subcutaneous 3 times per day    gabapentin  100 mg Oral Nightly    insulin lispro  0-18 Units Subcutaneous TID WC    insulin lispro  0-9 Units Subcutaneous Nightly    insulin regular  5 Units Subcutaneous Once     PRN Meds: promethazine, heparin (porcine), albuterol sulfate HFA, calcium carbonate, ipratropium-albuterol, nitroGLYCERIN, oxyCODONE-acetaminophen, sodium chloride flush, magnesium hydroxide, glucose, dextrose, glucagon (rDNA), dextrose, cyclobenzaprine      Intake/Output Summary (Last 24 hours) at 11/13/18 0832  Last data filed at 11/13/18 0651   Gross per 24 hour   Intake            12316 ml   Output            62650 ml   Net            -5225 ml       Physical Exam Performed:    BP (!) 114/56   Pulse 69   Temp 98.1 °F (36.7 °C) (Oral)   Resp 18   Ht 5' 9\" (1.753 m)   Wt 207 lb 8 oz (94.1 kg)   SpO2 98%   BMI 30.64 kg/m²     General appearance: No apparent distress, Assessment/Plan:    Active Hospital Problems    Diagnosis Date Noted    Tobacco smoking [Z72.0]      Priority: High    Type 2 diabetes, uncontrolled, with neuropathy ([facility] [address] [E11.40, E11.65] 03/04/2014     Priority: High    Diastolic dysfunction [E62.9] 11/12/2018    Anemia of chronic disease [D63.8] 11/12/2018    Peritoneal dialysis status ([facility] [address] [Z99.2] 11/09/2018    Acute respiratory failure ([facility] [J96.00] 11/09/2018    Pulmonary edema [J81.1] 11/09/2018    Fluid overload [E87.70] 11/09/2018    ESRD (end stage renal disease) on dialysis ([facility] [N18.6, Z99.2] 11/22/2017    Aortic valve stenosis [I35.0]     ZAINAB on CPAP [G47.33, Z99.89] 02/11/2016    Cardiomyopathy ([facility] [I42.9] 05/22/2013         Acute resp failure : likely due to vol overload. Pulm assisting. Pneumonia ruled out per pulm, procalcitonin was negative. Empiric abx d/gautam. Sating well on RA. Continue BIPAP QHS.    Acute on chronic systolic CHF : Ef 90-02% on recent echo in 9/2018. On peritoneal dialysis at home and does not make much urine. Nephro assisting. S/p HD on 11/10. Continue fluid removal per nephro. Monitor vol status with strict I's and O's, daily weights       ESRD : on peritoneal dialysis . Has dialysis catheter from previous admission. S/p HD on 11/10 and 11/12. Continue fluid removal per nephro     Elevated trop : chronic per chart review likely related to ESRD. Had normal stress dobutamine echo in 9/2018. Denied any chest pain. ACS unlikely. Trop downtrended     IDDM:  BG improved- continue lantus 20units QHS and high dose SSI     COPD: stable, does not seem in acute exacerbation as no wheezing currently. Continue bronchodilators PRN.    CAD : denied any chest pain. Continue aspirin, plavix, imdur  NTG PRN      ZAINAB: home CPAP faulty and has not been using it.  SW to assist with getting new device at d/c with outpatient f/u with pulm .      Hyperlipidemia: continue statin      Prolonged QTc on EKG : mag normal.

## 2018-11-14 ENCOUNTER — TELEPHONE (OUTPATIENT)
Dept: TELEMETRY | Age: 48
End: 2018-11-14

## 2018-11-14 LAB
BLOOD CULTURE, ROUTINE: NORMAL
CULTURE, BLOOD 2: NORMAL

## 2018-11-14 NOTE — TELEPHONE ENCOUNTER
tolerated.     Paula Cristina HF BSN-RN  Heart Failure Clinical Educator  [facility]  [place]ildardo Byars  [phone]

## 2018-11-19 ENCOUNTER — OFFICE VISIT (OUTPATIENT)
Dept: FAMILY MEDICINE CLINIC | Age: 48
End: 2018-11-19
Payer: MEDICARE

## 2018-11-19 ENCOUNTER — TELEPHONE (OUTPATIENT)
Dept: FAMILY MEDICINE CLINIC | Age: 48
End: 2018-11-19

## 2018-11-19 VITALS
RESPIRATION RATE: 16 BRPM | OXYGEN SATURATION: 98 % | WEIGHT: 215 LBS | HEIGHT: 68 IN | SYSTOLIC BLOOD PRESSURE: 120 MMHG | HEART RATE: 65 BPM | BODY MASS INDEX: 32.58 KG/M2 | DIASTOLIC BLOOD PRESSURE: 60 MMHG

## 2018-11-19 DIAGNOSIS — J96.02 ACUTE RESPIRATORY FAILURE WITH HYPOXIA AND HYPERCAPNIA (HCC): Primary | ICD-10-CM

## 2018-11-19 DIAGNOSIS — I50.43 ACUTE ON CHRONIC COMBINED SYSTOLIC AND DIASTOLIC HEART FAILURE (HCC): ICD-10-CM

## 2018-11-19 DIAGNOSIS — J96.01 ACUTE RESPIRATORY FAILURE WITH HYPOXIA AND HYPERCAPNIA (HCC): Primary | ICD-10-CM

## 2018-11-19 DIAGNOSIS — G62.9 NEUROPATHY: ICD-10-CM

## 2018-11-19 PROCEDURE — 1111F DSCHRG MED/CURRENT MED MERGE: CPT | Performed by: NURSE PRACTITIONER

## 2018-11-19 PROCEDURE — 99495 TRANSJ CARE MGMT MOD F2F 14D: CPT | Performed by: NURSE PRACTITIONER

## 2018-11-19 RX ORDER — GABAPENTIN 100 MG/1
100 CAPSULE ORAL 3 TIMES DAILY
Qty: 270 CAPSULE | Refills: 0 | Status: SHIPPED | OUTPATIENT
Start: 2018-11-19 | End: 2018-12-10 | Stop reason: SDUPTHER

## 2018-11-19 ASSESSMENT — ENCOUNTER SYMPTOMS
ABDOMINAL DISTENTION: 1
WHEEZING: 1
COUGH: 1
SHORTNESS OF BREATH: 1
RHINORRHEA: 1

## 2018-11-19 NOTE — PROGRESS NOTES
6 hours as needed for Wheezing             Alcohol Swabs PADS  USE AS DIRECTED             aspirin 81 MG chewable tablet  Take 1 tablet by mouth daily. B Complex-C-Folic Acid (VIRT-CAPS) 1 MG CAPS  TK ONE C PO  QD             Blood Glucose Monitoring Suppl ADAM  USE AS DIRECTED.             calcium carbonate (TUMS) 500 MG chewable tablet  Take 1 tablet by mouth 3 times daily as needed for Heartburn. carvedilol (COREG) 12.5 MG tablet  Take 1 tablet by mouth 2 times daily (with meals)             citalopram (CELEXA) 40 MG tablet  TAKE 1 TABLET EVERY DAY             clopidogrel (PLAVIX) 75 MG tablet  TAKE 1 TABLET EVERY DAY             cyclobenzaprine (FLEXERIL) 10 MG tablet  TK 1 T PO Q 8 H PRN             gabapentin (NEURONTIN) 100 MG capsule  Take 1 capsule by mouth 3 times daily for 90 days. .             Glucose Blood (BLOOD GLUCOSE TEST STRIPS) STRP  TEST 3-4 TIMES DAILY, AS DIRECTED             glucose blood VI test strips (FREESTYLE LITE) strip  Daily As needed.              glucose monitoring kit (FREESTYLE) monitoring kit  1 kit by Does not apply route daily             hydrOXYzine (VISTARIL) 50 MG capsule  TAKE 1 TO 2 CAPSULES EVERY NIGHT             insulin glargine (LANTUS) 100 UNIT/ML injection vial  Inject 10 Units into the skin nightly             Insulin Syringe-Needle U-100 30G X 1/2\" 0.5 ML MISC  1 each by Does not apply route daily             ipratropium-albuterol (DUONEB) 0.5-2.5 (3) MG/3ML SOLN nebulizer solution  Inhale 3 mLs into the lungs every 6 hours as needed for Shortness of Breath             isosorbide mononitrate (IMDUR) 30 MG extended release tablet  TAKE 1 TABLET EVERY DAY             Lancets MISC  Test daily             losartan (COZAAR) 50 MG tablet  Take 1 tablet by mouth daily             Magnesium Hydroxide (MILK OF MAGNESIA PO)               nitroGLYCERIN (NITROSTAT) 0.4 MG SL tablet  Place 1 tablet under the tongue every 5 minutes as needed for Chest pain             oxyCODONE-acetaminophen (PERCOCET) 7.5-325 MG per tablet  Take 1 tablet by mouth every 4 hours as needed for Pain. .             pantoprazole (PROTONIX) 40 MG tablet  Take 1 tablet by mouth every morning (before breakfast)             Pediatric Multivitamins-Fl (MULTI VIT/FL) 0.25 MG CHEW               Polyethylene Glycol 3350 GRAN               pravastatin (PRAVACHOL) 80 MG tablet  Take 1 tablet by mouth daily             torsemide (DEMADEX) 100 MG tablet  Take 1 tablet by mouth daily             traZODone (DESYREL) 150 MG tablet  Take 1 tablet by mouth nightly                   Medications marked \"taking\" at this time  Outpatient Prescriptions Marked as Taking for the 11/19/18 encounter (Office Visit) with JAY Harrison - CNP   Medication Sig Dispense Refill    gabapentin (NEURONTIN) 100 MG capsule Take 1 capsule by mouth 3 times daily for 90 days. . 270 capsule 0    losartan (COZAAR) 50 MG tablet Take 1 tablet by mouth daily 30 tablet 3    carvedilol (COREG) 12.5 MG tablet Take 1 tablet by mouth 2 times daily (with meals) 60 tablet 3    pantoprazole (PROTONIX) 40 MG tablet Take 1 tablet by mouth every morning (before breakfast) 30 tablet 3    citalopram (CELEXA) 40 MG tablet TAKE 1 TABLET EVERY DAY 90 tablet 1    Insulin Syringe-Needle U-100 30G X 1/2\" 0.5 ML MISC 1 each by Does not apply route daily 100 each 3    oxyCODONE-acetaminophen (PERCOCET) 7.5-325 MG per tablet Take 1 tablet by mouth every 4 hours as needed for Pain. Kurtis November isosorbide mononitrate (IMDUR) 30 MG extended release tablet TAKE 1 TABLET EVERY DAY 90 tablet 1    pravastatin (PRAVACHOL) 80 MG tablet Take 1 tablet by mouth daily 90 tablet 1    Magnesium Hydroxide (MILK OF MAGNESIA PO)       Pediatric Multivitamins-Fl (MULTI VIT/FL) 0.25 MG CHEW       Polyethylene Glycol 3350 GRAN       hydrOXYzine (VISTARIL) 50 MG capsule TAKE 1 TO 2 CAPSULES EVERY NIGHT 180 capsule 0    traZODone (DESYREL) 150 MG tablet Take

## 2018-11-26 RX ORDER — CLOPIDOGREL BISULFATE 75 MG/1
TABLET ORAL
Qty: 90 TABLET | Refills: 1 | Status: SHIPPED | OUTPATIENT
Start: 2018-11-26 | End: 2019-04-09 | Stop reason: SDUPTHER

## 2018-11-26 RX ORDER — TORSEMIDE 100 MG/1
TABLET ORAL
Qty: 90 TABLET | Refills: 1 | Status: SHIPPED | OUTPATIENT
Start: 2018-11-26 | End: 2019-03-27 | Stop reason: SDUPTHER

## 2018-11-26 RX ORDER — TRAZODONE HYDROCHLORIDE 150 MG/1
TABLET ORAL
Qty: 90 TABLET | Refills: 1 | Status: SHIPPED | OUTPATIENT
Start: 2018-11-26 | End: 2019-05-30 | Stop reason: SDUPTHER

## 2018-11-26 RX ORDER — PRAVASTATIN SODIUM 80 MG/1
TABLET ORAL
Qty: 90 TABLET | Refills: 1 | Status: SHIPPED | OUTPATIENT
Start: 2018-11-26 | End: 2019-04-09 | Stop reason: SDUPTHER

## 2018-11-26 RX ORDER — DILTIAZEM HYDROCHLORIDE 180 MG/1
CAPSULE, EXTENDED RELEASE ORAL
Qty: 90 CAPSULE | Refills: 1 | Status: SHIPPED | OUTPATIENT
Start: 2018-11-26 | End: 2019-05-30 | Stop reason: SDUPTHER

## 2018-11-29 RX ORDER — ISOSORBIDE MONONITRATE 30 MG/1
TABLET, EXTENDED RELEASE ORAL
Qty: 90 TABLET | Refills: 1 | Status: SHIPPED | OUTPATIENT
Start: 2018-11-29 | End: 2019-03-27 | Stop reason: SDUPTHER

## 2018-11-30 ENCOUNTER — OFFICE VISIT (OUTPATIENT)
Dept: PULMONOLOGY | Age: 48
End: 2018-11-30
Payer: MEDICARE

## 2018-11-30 VITALS
HEIGHT: 68 IN | SYSTOLIC BLOOD PRESSURE: 165 MMHG | HEART RATE: 92 BPM | OXYGEN SATURATION: 99 % | TEMPERATURE: 98.9 F | RESPIRATION RATE: 18 BRPM | WEIGHT: 208 LBS | BODY MASS INDEX: 31.52 KG/M2 | DIASTOLIC BLOOD PRESSURE: 91 MMHG

## 2018-11-30 DIAGNOSIS — G47.33 OSA (OBSTRUCTIVE SLEEP APNEA): Primary | ICD-10-CM

## 2018-11-30 DIAGNOSIS — F17.200 SMOKER: ICD-10-CM

## 2018-11-30 DIAGNOSIS — Z87.891 PERSONAL HISTORY OF NICOTINE DEPENDENCE: ICD-10-CM

## 2018-11-30 DIAGNOSIS — E66.9 OBESITY (BMI 30-39.9): ICD-10-CM

## 2018-11-30 DIAGNOSIS — J30.89 NON-SEASONAL ALLERGIC RHINITIS, UNSPECIFIED TRIGGER: ICD-10-CM

## 2018-11-30 DIAGNOSIS — J44.9 CHRONIC OBSTRUCTIVE PULMONARY DISEASE, UNSPECIFIED COPD TYPE (HCC): ICD-10-CM

## 2018-11-30 PROCEDURE — 99213 OFFICE O/P EST LOW 20 MIN: CPT | Performed by: INTERNAL MEDICINE

## 2018-11-30 PROCEDURE — 4004F PT TOBACCO SCREEN RCVD TLK: CPT | Performed by: INTERNAL MEDICINE

## 2018-11-30 PROCEDURE — G8598 ASA/ANTIPLAT THER USED: HCPCS | Performed by: INTERNAL MEDICINE

## 2018-11-30 PROCEDURE — 3017F COLORECTAL CA SCREEN DOC REV: CPT | Performed by: INTERNAL MEDICINE

## 2018-11-30 PROCEDURE — 1111F DSCHRG MED/CURRENT MED MERGE: CPT | Performed by: INTERNAL MEDICINE

## 2018-11-30 PROCEDURE — G8482 FLU IMMUNIZE ORDER/ADMIN: HCPCS | Performed by: INTERNAL MEDICINE

## 2018-11-30 PROCEDURE — 3023F SPIROM DOC REV: CPT | Performed by: INTERNAL MEDICINE

## 2018-11-30 PROCEDURE — G8926 SPIRO NO PERF OR DOC: HCPCS | Performed by: INTERNAL MEDICINE

## 2018-11-30 PROCEDURE — G8427 DOCREV CUR MEDS BY ELIG CLIN: HCPCS | Performed by: INTERNAL MEDICINE

## 2018-11-30 PROCEDURE — G8417 CALC BMI ABV UP PARAM F/U: HCPCS | Performed by: INTERNAL MEDICINE
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
## 2018-12-02 ASSESSMENT — ENCOUNTER SYMPTOMS
RHINORRHEA: 1
ABDOMINAL DISTENTION: 1
SHORTNESS OF BREATH: 1

## 2018-12-03 ENCOUNTER — TELEPHONE (OUTPATIENT)
Dept: CASE MANAGEMENT | Age: 48
End: 2018-12-03

## 2018-12-07 ENCOUNTER — TELEPHONE (OUTPATIENT)
Dept: FAMILY MEDICINE CLINIC | Age: 48
End: 2018-12-07
Payer: MEDICARE

## 2018-12-07 DIAGNOSIS — I13.2 HYPERTENSIVE HEART AND CHRONIC KIDNEY DISEASE WITH HEART FAILURE AND WITH STAGE 5 CHRONIC KIDNEY DISEASE, OR END STAGE RENAL DISEASE (HCC): Primary | ICD-10-CM

## 2018-12-07 PROCEDURE — G0180 MD CERTIFICATION HHA PATIENT: HCPCS | Performed by: FAMILY MEDICINE

## 2018-12-10 ENCOUNTER — HOSPITAL ENCOUNTER (OUTPATIENT)
Dept: PULMONOLOGY | Age: 48
Discharge: HOME | End: 2018-12-10
Payer: MEDICARE

## 2018-12-10 ENCOUNTER — HOSPITAL ENCOUNTER (OUTPATIENT)
Dept: CT IMAGING | Age: 48
Discharge: HOME | End: 2018-12-10
Payer: MEDICARE

## 2018-12-10 ENCOUNTER — OFFICE VISIT (OUTPATIENT)
Dept: FAMILY MEDICINE CLINIC | Age: 48
End: 2018-12-10
Payer: MEDICARE

## 2018-12-10 VITALS
WEIGHT: 213 LBS | SYSTOLIC BLOOD PRESSURE: 130 MMHG | OXYGEN SATURATION: 98 % | DIASTOLIC BLOOD PRESSURE: 86 MMHG | BODY MASS INDEX: 32.39 KG/M2 | HEART RATE: 78 BPM

## 2018-12-10 DIAGNOSIS — F17.200 SMOKER: ICD-10-CM

## 2018-12-10 DIAGNOSIS — J44.9 CHRONIC OBSTRUCTIVE PULMONARY DISEASE, UNSPECIFIED COPD TYPE (HCC): ICD-10-CM

## 2018-12-10 DIAGNOSIS — K59.04 CHRONIC IDIOPATHIC CONSTIPATION: ICD-10-CM

## 2018-12-10 DIAGNOSIS — I73.9 PVD (PERIPHERAL VASCULAR DISEASE) (HCC): Chronic | ICD-10-CM

## 2018-12-10 DIAGNOSIS — Z87.891 PERSONAL HISTORY OF NICOTINE DEPENDENCE: ICD-10-CM

## 2018-12-10 DIAGNOSIS — I73.9 CLAUDICATION (HCC): Primary | ICD-10-CM

## 2018-12-10 DIAGNOSIS — I42.9 CARDIOMYOPATHY, UNSPECIFIED TYPE (HCC): ICD-10-CM

## 2018-12-10 DIAGNOSIS — R00.2 PALPITATIONS: ICD-10-CM

## 2018-12-10 DIAGNOSIS — I25.10 CORONARY ARTERY DISEASE INVOLVING NATIVE HEART WITHOUT ANGINA PECTORIS, UNSPECIFIED VESSEL OR LESION TYPE: ICD-10-CM

## 2018-12-10 DIAGNOSIS — G62.9 NEUROPATHY: ICD-10-CM

## 2018-12-10 PROCEDURE — G8598 ASA/ANTIPLAT THER USED: HCPCS | Performed by: FAMILY MEDICINE

## 2018-12-10 PROCEDURE — 3017F COLORECTAL CA SCREEN DOC REV: CPT | Performed by: FAMILY MEDICINE

## 2018-12-10 PROCEDURE — 94618 PULMONARY STRESS TESTING: CPT

## 2018-12-10 PROCEDURE — 4004F PT TOBACCO SCREEN RCVD TLK: CPT | Performed by: FAMILY MEDICINE

## 2018-12-10 PROCEDURE — G8482 FLU IMMUNIZE ORDER/ADMIN: HCPCS | Performed by: FAMILY MEDICINE

## 2018-12-10 PROCEDURE — 94729 DIFFUSING CAPACITY: CPT

## 2018-12-10 PROCEDURE — 3045F PR MOST RECENT HEMOGLOBIN A1C LEVEL 7.0-9.0%: CPT | Performed by: FAMILY MEDICINE

## 2018-12-10 PROCEDURE — 99214 OFFICE O/P EST MOD 30 MIN: CPT | Performed by: FAMILY MEDICINE

## 2018-12-10 PROCEDURE — G8427 DOCREV CUR MEDS BY ELIG CLIN: HCPCS | Performed by: FAMILY MEDICINE

## 2018-12-10 PROCEDURE — G0297 LDCT FOR LUNG CA SCREEN: HCPCS

## 2018-12-10 PROCEDURE — 94010 BREATHING CAPACITY TEST: CPT

## 2018-12-10 PROCEDURE — 1111F DSCHRG MED/CURRENT MED MERGE: CPT | Performed by: FAMILY MEDICINE

## 2018-12-10 PROCEDURE — 2022F DILAT RTA XM EVC RTNOPTHY: CPT | Performed by: FAMILY MEDICINE

## 2018-12-10 PROCEDURE — 94726 PLETHYSMOGRAPHY LUNG VOLUMES: CPT

## 2018-12-10 PROCEDURE — G8417 CALC BMI ABV UP PARAM F/U: HCPCS | Performed by: FAMILY MEDICINE

## 2018-12-10 RX ORDER — GABAPENTIN 100 MG/1
100 CAPSULE ORAL 3 TIMES DAILY
Qty: 270 CAPSULE | Refills: 0 | Status: SHIPPED | OUTPATIENT
Start: 2018-12-10 | End: 2019-03-25

## 2018-12-10 RX ORDER — NITROGLYCERIN 0.4 MG/1
0.4 TABLET SUBLINGUAL EVERY 5 MIN PRN
Qty: 25 TABLET | Refills: 3 | Status: SHIPPED | OUTPATIENT
Start: 2018-12-10 | End: 2019-05-30 | Stop reason: SDUPTHER

## 2018-12-10 RX ORDER — NITROGLYCERIN 0.4 MG/1
0.4 TABLET SUBLINGUAL EVERY 5 MIN PRN
Qty: 25 TABLET | Refills: 3 | Status: SHIPPED | OUTPATIENT
Start: 2018-12-10 | End: 2019-01-07 | Stop reason: SDUPTHER

## 2018-12-10 NOTE — PROGRESS NOTES
He is oriented to person, place, and time. He appears well-developed and well-nourished. HENT:   Head: Normocephalic and atraumatic. Right Ear: External ear normal.   Left Ear: External ear normal.   Nose: Nose normal.   Mouth/Throat: Oropharynx is clear and moist.   Eyes: Pupils are equal, round, and reactive to light. Conjunctivae and EOM are normal.   Neck: Normal range of motion. Neck supple. Cardiovascular: Normal rate, regular rhythm, normal heart sounds and intact distal pulses. Exam reveals no gallop and no friction rub. No murmur heard. Pulmonary/Chest: Effort normal and breath sounds normal. No respiratory distress. He has no wheezes. Abdominal: Soft. Bowel sounds are normal. He exhibits no distension. There is no tenderness. Musculoskeletal: Normal range of motion. He exhibits edema. He exhibits no tenderness. Neurological: He is alert and oriented to person, place, and time. He has normal reflexes. Skin: Skin is warm and dry. Psychiatric: He has a normal mood and affect. His behavior is normal. Judgment and thought content normal.   Nursing note and vitals reviewed. ASSESSMENT/PLAN:  1. Claudication ([address]    - US DESTINEY LIDIA LIMITED 1-2 LEVELS    2. PVD (peripheral vascular disease) ([address]      3. Type 2 diabetes, uncontrolled, with neuropathy ([facility])      4. Cardiomyopathy, unspecified type ([address]      5. Coronary artery disease involving native heart without angina pectoris, unspecified vessel or lesion type    - nitroGLYCERIN (NITROSTAT) 0.4 MG SL tablet; Place 1 tablet under the tongue every 5 minutes as needed for Chest pain up to max of 3 total doses. If no relief after 1 dose, call 911. Dispense: 25 tablet; Refill: 3  - nitroGLYCERIN (NITROSTAT) 0.4 MG SL tablet; Place 1 tablet under the tongue every 5 minutes as needed for Chest pain up to max of 3 total doses. If no relief after 1 dose, call 911. Dispense: 25 tablet; Refill: 3    6.  Palpitations    - Holter Monitor 48 Hour;

## 2018-12-19 ENCOUNTER — HOSPITAL ENCOUNTER (OUTPATIENT)
Dept: NON INVASIVE DIAGNOSTICS | Age: 48
Discharge: HOME | End: 2018-12-19
Payer: MEDICARE

## 2018-12-19 ENCOUNTER — HOSPITAL ENCOUNTER (OUTPATIENT)
Dept: VASCULAR LAB | Age: 48
Discharge: HOME | End: 2018-12-19
Payer: MEDICARE

## 2018-12-19 DIAGNOSIS — R00.2 PALPITATIONS: ICD-10-CM

## 2018-12-19 PROCEDURE — 93225 XTRNL ECG REC<48 HRS REC: CPT

## 2018-12-19 PROCEDURE — 93922 UPR/L XTREMITY ART 2 LEVELS: CPT

## 2018-12-24 ENCOUNTER — HOSPITAL ENCOUNTER (INPATIENT)
Age: 48
LOS: 4 days | Discharge: HOME HEALTH | DRG: 919 | End: 2018-12-28
Attending: EMERGENCY MEDICINE | Admitting: INTERNAL MEDICINE
Payer: MEDICARE

## 2018-12-24 ENCOUNTER — APPOINTMENT (OUTPATIENT)
Dept: GENERAL RADIOLOGY | Age: 48
DRG: 919 | End: 2018-12-24
Payer: MEDICARE

## 2018-12-24 DIAGNOSIS — J44.1 COPD EXACERBATION (HCC): Primary | ICD-10-CM

## 2018-12-24 DIAGNOSIS — I50.9 ACUTE ON CHRONIC CONGESTIVE HEART FAILURE, UNSPECIFIED HEART FAILURE TYPE (HCC): ICD-10-CM

## 2018-12-24 DIAGNOSIS — R77.8 ELEVATED TROPONIN: ICD-10-CM

## 2018-12-24 PROBLEM — R06.02 SOB (SHORTNESS OF BREATH): Status: ACTIVE | Noted: 2018-12-24

## 2018-12-24 LAB
A/G RATIO: 0.9 (ref 1.1–2.2)
ALBUMIN SERPL-MCNC: 3.4 G/DL (ref 3.4–5)
ALP BLD-CCNC: 68 U/L (ref 40–129)
ALT SERPL-CCNC: 12 U/L (ref 10–40)
ANION GAP SERPL CALCULATED.3IONS-SCNC: 15 MMOL/L (ref 3–16)
ANION GAP SERPL CALCULATED.3IONS-SCNC: 15 MMOL/L (ref 3–16)
AST SERPL-CCNC: 29 U/L (ref 15–37)
BASE EXCESS ARTERIAL: 6.8 MMOL/L (ref -3–3)
BASOPHILS ABSOLUTE: 0.1 K/UL (ref 0–0.2)
BASOPHILS RELATIVE PERCENT: 0.6 %
BILIRUB SERPL-MCNC: <0.2 MG/DL (ref 0–1)
BUN BLDV-MCNC: 50 MG/DL (ref 7–20)
BUN BLDV-MCNC: 51 MG/DL (ref 7–20)
CALCIUM SERPL-MCNC: 8.8 MG/DL (ref 8.3–10.6)
CALCIUM SERPL-MCNC: 8.8 MG/DL (ref 8.3–10.6)
CARBOXYHEMOGLOBIN ARTERIAL: 3.3 % (ref 0–1.5)
CHLORIDE BLD-SCNC: 91 MMOL/L (ref 99–110)
CHLORIDE BLD-SCNC: 94 MMOL/L (ref 99–110)
CO2: 28 MMOL/L (ref 21–32)
CO2: 28 MMOL/L (ref 21–32)
CREAT SERPL-MCNC: 3.8 MG/DL (ref 0.9–1.3)
CREAT SERPL-MCNC: 3.9 MG/DL (ref 0.9–1.3)
EKG ATRIAL RATE: 80 BPM
EKG DIAGNOSIS: NORMAL
EKG P AXIS: 84 DEGREES
EKG P-R INTERVAL: 162 MS
EKG Q-T INTERVAL: 432 MS
EKG QRS DURATION: 94 MS
EKG QTC CALCULATION (BAZETT): 498 MS
EKG R AXIS: 17 DEGREES
EKG T AXIS: 80 DEGREES
EKG VENTRICULAR RATE: 80 BPM
EOSINOPHILS ABSOLUTE: 0.6 K/UL (ref 0–0.6)
EOSINOPHILS RELATIVE PERCENT: 3.5 %
GFR AFRICAN AMERICAN: 20
GFR AFRICAN AMERICAN: 20
GFR NON-AFRICAN AMERICAN: 16
GFR NON-AFRICAN AMERICAN: 17
GLOBULIN: 3.6 G/DL
GLUCOSE BLD-MCNC: 146 MG/DL (ref 70–99)
GLUCOSE BLD-MCNC: 147 MG/DL (ref 70–99)
GLUCOSE BLD-MCNC: 200 MG/DL (ref 70–99)
GLUCOSE BLD-MCNC: 260 MG/DL (ref 70–99)
GLUCOSE BLD-MCNC: 306 MG/DL (ref 70–99)
GLUCOSE BLD-MCNC: 454 MG/DL (ref 70–99)
GLUCOSE BLD-MCNC: 465 MG/DL (ref 70–99)
HCO3 ARTERIAL: 30.3 MMOL/L (ref 21–29)
HCT VFR BLD CALC: 38.6 % (ref 40.5–52.5)
HEMOGLOBIN, ART, EXTENDED: 11.8 G/DL (ref 13.5–17.5)
HEMOGLOBIN: 12.5 G/DL (ref 13.5–17.5)
INR BLD: 1.06 (ref 0.86–1.14)
LYMPHOCYTES ABSOLUTE: 1.4 K/UL (ref 1–5.1)
LYMPHOCYTES RELATIVE PERCENT: 8.4 %
MCH RBC QN AUTO: 30 PG (ref 26–34)
MCHC RBC AUTO-ENTMCNC: 32.4 G/DL (ref 31–36)
MCV RBC AUTO: 92.5 FL (ref 80–100)
METHEMOGLOBIN ARTERIAL: 0.4 %
MONOCYTES ABSOLUTE: 0.9 K/UL (ref 0–1.3)
MONOCYTES RELATIVE PERCENT: 5.9 %
NEUTROPHILS ABSOLUTE: 13.1 K/UL (ref 1.7–7.7)
NEUTROPHILS RELATIVE PERCENT: 81.6 %
O2 CONTENT ARTERIAL: 16 ML/DL
O2 SAT, ARTERIAL: 98.4 %
O2 THERAPY: ABNORMAL
PCO2 ARTERIAL: 38.7 MMHG (ref 35–45)
PDW BLD-RTO: 16.3 % (ref 12.4–15.4)
PERFORMED ON: ABNORMAL
PH ARTERIAL: 7.51 (ref 7.35–7.45)
PLATELET # BLD: 312 K/UL (ref 135–450)
PMV BLD AUTO: 7.7 FL (ref 5–10.5)
PO2 ARTERIAL: 189.9 MMHG (ref 75–108)
POTASSIUM REFLEX MAGNESIUM: 3.7 MMOL/L (ref 3.5–5.1)
POTASSIUM SERPL-SCNC: 5.1 MMOL/L (ref 3.5–5.1)
PROTHROMBIN TIME: 12.1 SEC (ref 9.8–13)
RBC # BLD: 4.17 M/UL (ref 4.2–5.9)
SODIUM BLD-SCNC: 134 MMOL/L (ref 136–145)
SODIUM BLD-SCNC: 137 MMOL/L (ref 136–145)
TCO2 ARTERIAL: 31.4 MMOL/L
TOTAL PROTEIN: 7 G/DL (ref 6.4–8.2)
TROPONIN: 0.02 NG/ML
TROPONIN: 0.03 NG/ML
TROPONIN: 0.03 NG/ML
WBC # BLD: 16.1 K/UL (ref 4–11)

## 2018-12-24 PROCEDURE — 71045 X-RAY EXAM CHEST 1 VIEW: CPT

## 2018-12-24 PROCEDURE — 6360000002 HC RX W HCPCS: Performed by: NURSE PRACTITIONER

## 2018-12-24 PROCEDURE — 82803 BLOOD GASES ANY COMBINATION: CPT

## 2018-12-24 PROCEDURE — 90937 HEMODIALYSIS REPEATED EVAL: CPT

## 2018-12-24 PROCEDURE — 2700000000 HC OXYGEN THERAPY PER DAY

## 2018-12-24 PROCEDURE — 85025 COMPLETE CBC W/AUTO DIFF WBC: CPT

## 2018-12-24 PROCEDURE — 36600 WITHDRAWAL OF ARTERIAL BLOOD: CPT

## 2018-12-24 PROCEDURE — 2580000003 HC RX 258: Performed by: INTERNAL MEDICINE

## 2018-12-24 PROCEDURE — 6360000002 HC RX W HCPCS: Performed by: INTERNAL MEDICINE

## 2018-12-24 PROCEDURE — 83036 HEMOGLOBIN GLYCOSYLATED A1C: CPT

## 2018-12-24 PROCEDURE — 2060000000 HC ICU INTERMEDIATE R&B

## 2018-12-24 PROCEDURE — 94660 CPAP INITIATION&MGMT: CPT

## 2018-12-24 PROCEDURE — 94761 N-INVAS EAR/PLS OXIMETRY MLT: CPT

## 2018-12-24 PROCEDURE — 80053 COMPREHEN METABOLIC PANEL: CPT

## 2018-12-24 PROCEDURE — 89051 BODY FLUID CELL COUNT: CPT

## 2018-12-24 PROCEDURE — 6370000000 HC RX 637 (ALT 250 FOR IP): Performed by: INTERNAL MEDICINE

## 2018-12-24 PROCEDURE — 94150 VITAL CAPACITY TEST: CPT

## 2018-12-24 PROCEDURE — 6370000000 HC RX 637 (ALT 250 FOR IP): Performed by: EMERGENCY MEDICINE

## 2018-12-24 PROCEDURE — 94664 DEMO&/EVAL PT USE INHALER: CPT

## 2018-12-24 PROCEDURE — 93010 ELECTROCARDIOGRAM REPORT: CPT | Performed by: INTERNAL MEDICINE

## 2018-12-24 PROCEDURE — 85610 PROTHROMBIN TIME: CPT

## 2018-12-24 PROCEDURE — 5A1D70Z PERFORMANCE OF URINARY FILTRATION, INTERMITTENT, LESS THAN 6 HOURS PER DAY: ICD-10-PCS | Performed by: INTERNAL MEDICINE

## 2018-12-24 PROCEDURE — 93005 ELECTROCARDIOGRAM TRACING: CPT | Performed by: EMERGENCY MEDICINE

## 2018-12-24 PROCEDURE — 96375 TX/PRO/DX INJ NEW DRUG ADDON: CPT

## 2018-12-24 PROCEDURE — 6360000002 HC RX W HCPCS: Performed by: EMERGENCY MEDICINE

## 2018-12-24 PROCEDURE — 94640 AIRWAY INHALATION TREATMENT: CPT

## 2018-12-24 PROCEDURE — 36415 COLL VENOUS BLD VENIPUNCTURE: CPT

## 2018-12-24 PROCEDURE — 96374 THER/PROPH/DIAG INJ IV PUSH: CPT

## 2018-12-24 PROCEDURE — 99285 EMERGENCY DEPT VISIT HI MDM: CPT

## 2018-12-24 PROCEDURE — 84484 ASSAY OF TROPONIN QUANT: CPT

## 2018-12-24 RX ORDER — CARVEDILOL 6.25 MG/1
12.5 TABLET ORAL 2 TIMES DAILY WITH MEALS
Status: DISCONTINUED | OUTPATIENT
Start: 2018-12-24 | End: 2018-12-28 | Stop reason: HOSPADM

## 2018-12-24 RX ORDER — HEPARIN SODIUM 1000 [USP'U]/ML
3800 INJECTION, SOLUTION INTRAVENOUS; SUBCUTANEOUS PRN
Status: DISCONTINUED | OUTPATIENT
Start: 2018-12-24 | End: 2018-12-28 | Stop reason: HOSPADM

## 2018-12-24 RX ORDER — DEXTROSE MONOHYDRATE 25 G/50ML
12.5 INJECTION, SOLUTION INTRAVENOUS PRN
Status: DISCONTINUED | OUTPATIENT
Start: 2018-12-24 | End: 2018-12-28 | Stop reason: HOSPADM

## 2018-12-24 RX ORDER — LOSARTAN POTASSIUM 25 MG/1
50 TABLET ORAL DAILY
Status: DISCONTINUED | OUTPATIENT
Start: 2018-12-24 | End: 2018-12-28 | Stop reason: HOSPADM

## 2018-12-24 RX ORDER — SODIUM CHLORIDE 0.9 % (FLUSH) 0.9 %
10 SYRINGE (ML) INJECTION PRN
Status: DISCONTINUED | OUTPATIENT
Start: 2018-12-24 | End: 2018-12-28 | Stop reason: HOSPADM

## 2018-12-24 RX ORDER — SODIUM CHLORIDE 0.9 % (FLUSH) 0.9 %
10 SYRINGE (ML) INJECTION EVERY 12 HOURS SCHEDULED
Status: DISCONTINUED | OUTPATIENT
Start: 2018-12-24 | End: 2018-12-28 | Stop reason: HOSPADM

## 2018-12-24 RX ORDER — PANTOPRAZOLE SODIUM 40 MG/1
40 TABLET, DELAYED RELEASE ORAL
Status: DISCONTINUED | OUTPATIENT
Start: 2018-12-24 | End: 2018-12-28 | Stop reason: HOSPADM

## 2018-12-24 RX ORDER — CYCLOBENZAPRINE HCL 10 MG
10 TABLET ORAL 3 TIMES DAILY PRN
Status: DISCONTINUED | OUTPATIENT
Start: 2018-12-24 | End: 2018-12-28 | Stop reason: HOSPADM

## 2018-12-24 RX ORDER — ONDANSETRON 2 MG/ML
4 INJECTION INTRAMUSCULAR; INTRAVENOUS EVERY 6 HOURS PRN
Status: DISCONTINUED | OUTPATIENT
Start: 2018-12-24 | End: 2018-12-28 | Stop reason: HOSPADM

## 2018-12-24 RX ORDER — ASPIRIN 325 MG
325 TABLET ORAL ONCE
Status: COMPLETED | OUTPATIENT
Start: 2018-12-24 | End: 2018-12-24

## 2018-12-24 RX ORDER — FUROSEMIDE 10 MG/ML
40 INJECTION INTRAMUSCULAR; INTRAVENOUS ONCE
Status: COMPLETED | OUTPATIENT
Start: 2018-12-24 | End: 2018-12-24

## 2018-12-24 RX ORDER — DILTIAZEM HYDROCHLORIDE 180 MG/1
180 CAPSULE, COATED, EXTENDED RELEASE ORAL DAILY
Status: DISCONTINUED | OUTPATIENT
Start: 2018-12-24 | End: 2018-12-28 | Stop reason: HOSPADM

## 2018-12-24 RX ORDER — ASPIRIN 81 MG/1
81 TABLET, CHEWABLE ORAL DAILY
Status: DISCONTINUED | OUTPATIENT
Start: 2018-12-24 | End: 2018-12-28 | Stop reason: HOSPADM

## 2018-12-24 RX ORDER — TRAZODONE HYDROCHLORIDE 50 MG/1
100 TABLET ORAL NIGHTLY
Status: DISCONTINUED | OUTPATIENT
Start: 2018-12-24 | End: 2018-12-28 | Stop reason: HOSPADM

## 2018-12-24 RX ORDER — INSULIN GLARGINE 100 [IU]/ML
10 INJECTION, SOLUTION SUBCUTANEOUS NIGHTLY
Status: DISCONTINUED | OUTPATIENT
Start: 2018-12-24 | End: 2018-12-28 | Stop reason: HOSPADM

## 2018-12-24 RX ORDER — HEPARIN SODIUM 5000 [USP'U]/ML
5000 INJECTION, SOLUTION INTRAVENOUS; SUBCUTANEOUS EVERY 8 HOURS SCHEDULED
Status: DISCONTINUED | OUTPATIENT
Start: 2018-12-24 | End: 2018-12-28 | Stop reason: HOSPADM

## 2018-12-24 RX ORDER — DEXTROSE MONOHYDRATE 50 MG/ML
100 INJECTION, SOLUTION INTRAVENOUS PRN
Status: DISCONTINUED | OUTPATIENT
Start: 2018-12-24 | End: 2018-12-28 | Stop reason: HOSPADM

## 2018-12-24 RX ORDER — IPRATROPIUM BROMIDE AND ALBUTEROL SULFATE 2.5; .5 MG/3ML; MG/3ML
1 SOLUTION RESPIRATORY (INHALATION)
Status: DISCONTINUED | OUTPATIENT
Start: 2018-12-24 | End: 2018-12-28 | Stop reason: HOSPADM

## 2018-12-24 RX ORDER — TRAZODONE HYDROCHLORIDE 50 MG/1
150 TABLET ORAL NIGHTLY
Status: DISCONTINUED | OUTPATIENT
Start: 2018-12-24 | End: 2018-12-28 | Stop reason: HOSPADM

## 2018-12-24 RX ORDER — PRAVASTATIN SODIUM 40 MG
40 TABLET ORAL DAILY
Status: DISCONTINUED | OUTPATIENT
Start: 2018-12-24 | End: 2018-12-28 | Stop reason: HOSPADM

## 2018-12-24 RX ORDER — OXYCODONE AND ACETAMINOPHEN 7.5; 325 MG/1; MG/1
1 TABLET ORAL EVERY 4 HOURS PRN
Status: DISCONTINUED | OUTPATIENT
Start: 2018-12-24 | End: 2018-12-28 | Stop reason: HOSPADM

## 2018-12-24 RX ORDER — TORSEMIDE 100 MG/1
100 TABLET ORAL DAILY
Status: DISCONTINUED | OUTPATIENT
Start: 2018-12-24 | End: 2018-12-28 | Stop reason: HOSPADM

## 2018-12-24 RX ORDER — IPRATROPIUM BROMIDE AND ALBUTEROL SULFATE 2.5; .5 MG/3ML; MG/3ML
1 SOLUTION RESPIRATORY (INHALATION) ONCE
Status: COMPLETED | OUTPATIENT
Start: 2018-12-24 | End: 2018-12-24

## 2018-12-24 RX ORDER — ISOSORBIDE MONONITRATE 30 MG/1
30 TABLET, EXTENDED RELEASE ORAL DAILY
Status: DISCONTINUED | OUTPATIENT
Start: 2018-12-24 | End: 2018-12-28 | Stop reason: HOSPADM

## 2018-12-24 RX ORDER — GABAPENTIN 100 MG/1
100 CAPSULE ORAL 3 TIMES DAILY
Status: DISCONTINUED | OUTPATIENT
Start: 2018-12-24 | End: 2018-12-26

## 2018-12-24 RX ORDER — METHYLPREDNISOLONE SODIUM SUCCINATE 125 MG/2ML
125 INJECTION, POWDER, LYOPHILIZED, FOR SOLUTION INTRAMUSCULAR; INTRAVENOUS ONCE
Status: COMPLETED | OUTPATIENT
Start: 2018-12-24 | End: 2018-12-24

## 2018-12-24 RX ORDER — SODIUM CHLORIDE, SODIUM LACTATE, CALCIUM CHLORIDE, MAGNESIUM CHLORIDE AND DEXTROSE 2.5; 538; 448; 18.3; 5.08 G/100ML; MG/100ML; MG/100ML; MG/100ML; MG/100ML
2000 INJECTION, SOLUTION INTRAPERITONEAL EVERY 4 HOURS
Status: DISCONTINUED | OUTPATIENT
Start: 2018-12-24 | End: 2018-12-28 | Stop reason: HOSPADM

## 2018-12-24 RX ORDER — NICOTINE POLACRILEX 4 MG
15 LOZENGE BUCCAL PRN
Status: DISCONTINUED | OUTPATIENT
Start: 2018-12-24 | End: 2018-12-28 | Stop reason: HOSPADM

## 2018-12-24 RX ORDER — CITALOPRAM 20 MG/1
40 TABLET ORAL DAILY
Status: DISCONTINUED | OUTPATIENT
Start: 2018-12-24 | End: 2018-12-28 | Stop reason: HOSPADM

## 2018-12-24 RX ORDER — FUROSEMIDE 10 MG/ML
80 INJECTION INTRAMUSCULAR; INTRAVENOUS ONCE
Status: COMPLETED | OUTPATIENT
Start: 2018-12-24 | End: 2018-12-24

## 2018-12-24 RX ORDER — NITROGLYCERIN 0.4 MG/1
0.4 TABLET SUBLINGUAL EVERY 5 MIN PRN
Status: DISCONTINUED | OUTPATIENT
Start: 2018-12-24 | End: 2018-12-28 | Stop reason: HOSPADM

## 2018-12-24 RX ORDER — CLOPIDOGREL BISULFATE 75 MG/1
75 TABLET ORAL DAILY
Status: DISCONTINUED | OUTPATIENT
Start: 2018-12-24 | End: 2018-12-28 | Stop reason: HOSPADM

## 2018-12-24 RX ADMIN — SODIUM CHLORIDE, SODIUM LACTATE, CALCIUM CHLORIDE, MAGNESIUM CHLORIDE AND DEXTROSE 2000 ML: 2.5; 538; 448; 18.3; 5.08 INJECTION, SOLUTION INTRAPERITONEAL at 10:07

## 2018-12-24 RX ADMIN — OXYCODONE HYDROCHLORIDE AND ACETAMINOPHEN 1 TABLET: 7.5; 325 TABLET ORAL at 18:00

## 2018-12-24 RX ADMIN — INSULIN LISPRO 18 UNITS: 100 INJECTION, SOLUTION INTRAVENOUS; SUBCUTANEOUS at 12:02

## 2018-12-24 RX ADMIN — METHYLPREDNISOLONE SODIUM SUCCINATE 125 MG: 125 INJECTION, POWDER, FOR SOLUTION INTRAMUSCULAR; INTRAVENOUS at 02:13

## 2018-12-24 RX ADMIN — Medication 10 ML: at 22:18

## 2018-12-24 RX ADMIN — ASPIRIN 325 MG: 325 TABLET, COATED ORAL at 03:50

## 2018-12-24 RX ADMIN — CARVEDILOL 12.5 MG: 6.25 TABLET, FILM COATED ORAL at 09:06

## 2018-12-24 RX ADMIN — ISOSORBIDE MONONITRATE 30 MG: 30 TABLET, EXTENDED RELEASE ORAL at 09:06

## 2018-12-24 RX ADMIN — GABAPENTIN 100 MG: 100 CAPSULE ORAL at 09:07

## 2018-12-24 RX ADMIN — IPRATROPIUM BROMIDE AND ALBUTEROL SULFATE 1 AMPULE: .5; 3 SOLUTION RESPIRATORY (INHALATION) at 08:22

## 2018-12-24 RX ADMIN — FUROSEMIDE 40 MG: 10 INJECTION, SOLUTION INTRAMUSCULAR; INTRAVENOUS at 03:50

## 2018-12-24 RX ADMIN — OXYCODONE HYDROCHLORIDE AND ACETAMINOPHEN 1 TABLET: 7.5; 325 TABLET ORAL at 04:22

## 2018-12-24 RX ADMIN — IPRATROPIUM BROMIDE AND ALBUTEROL SULFATE 1 AMPULE: .5; 3 SOLUTION RESPIRATORY (INHALATION) at 02:39

## 2018-12-24 RX ADMIN — OXYCODONE HYDROCHLORIDE AND ACETAMINOPHEN 1 TABLET: 7.5; 325 TABLET ORAL at 09:07

## 2018-12-24 RX ADMIN — ONDANSETRON 4 MG: 2 INJECTION INTRAMUSCULAR; INTRAVENOUS at 18:34

## 2018-12-24 RX ADMIN — CYCLOBENZAPRINE HYDROCHLORIDE 10 MG: 10 TABLET, FILM COATED ORAL at 07:57

## 2018-12-24 RX ADMIN — CARVEDILOL 12.5 MG: 6.25 TABLET, FILM COATED ORAL at 17:59

## 2018-12-24 RX ADMIN — IPRATROPIUM BROMIDE AND ALBUTEROL SULFATE 1 AMPULE: .5; 3 SOLUTION RESPIRATORY (INHALATION) at 11:56

## 2018-12-24 RX ADMIN — FUROSEMIDE 80 MG: 10 INJECTION, SOLUTION INTRAMUSCULAR; INTRAVENOUS at 09:23

## 2018-12-24 RX ADMIN — GABAPENTIN 100 MG: 100 CAPSULE ORAL at 22:18

## 2018-12-24 RX ADMIN — PROCHLORPERAZINE EDISYLATE 10 MG: 5 INJECTION INTRAMUSCULAR; INTRAVENOUS at 20:28

## 2018-12-24 RX ADMIN — LOSARTAN POTASSIUM 50 MG: 25 TABLET, FILM COATED ORAL at 09:06

## 2018-12-24 RX ADMIN — ASPIRIN 81 MG 81 MG: 81 TABLET ORAL at 09:06

## 2018-12-24 RX ADMIN — NITROGLYCERIN 1 INCH: 20 OINTMENT TOPICAL at 03:50

## 2018-12-24 RX ADMIN — INSULIN LISPRO 3 UNITS: 100 INJECTION, SOLUTION INTRAVENOUS; SUBCUTANEOUS at 18:00

## 2018-12-24 RX ADMIN — TRAZODONE HYDROCHLORIDE 150 MG: 50 TABLET ORAL at 22:18

## 2018-12-24 RX ADMIN — CLOPIDOGREL BISULFATE 75 MG: 75 TABLET ORAL at 09:06

## 2018-12-24 RX ADMIN — ONDANSETRON 4 MG: 2 INJECTION INTRAMUSCULAR; INTRAVENOUS at 09:31

## 2018-12-24 RX ADMIN — OXYCODONE HYDROCHLORIDE AND ACETAMINOPHEN 1 TABLET: 7.5; 325 TABLET ORAL at 22:17

## 2018-12-24 RX ADMIN — INSULIN GLARGINE 10 UNITS: 100 INJECTION, SOLUTION SUBCUTANEOUS at 22:25

## 2018-12-24 RX ADMIN — PRAVASTATIN SODIUM 40 MG: 40 TABLET ORAL at 09:06

## 2018-12-24 RX ADMIN — CITALOPRAM HYDROBROMIDE 40 MG: 20 TABLET ORAL at 09:06

## 2018-12-24 RX ADMIN — HEPARIN SODIUM 5000 UNITS: 5000 INJECTION INTRAVENOUS; SUBCUTANEOUS at 09:07

## 2018-12-24 RX ADMIN — OXYCODONE HYDROCHLORIDE AND ACETAMINOPHEN 1 TABLET: 7.5; 325 TABLET ORAL at 13:43

## 2018-12-24 RX ADMIN — IPRATROPIUM BROMIDE AND ALBUTEROL SULFATE 1 AMPULE: .5; 3 SOLUTION RESPIRATORY (INHALATION) at 20:59

## 2018-12-24 RX ADMIN — INSULIN LISPRO 6 UNITS: 100 INJECTION, SOLUTION INTRAVENOUS; SUBCUTANEOUS at 22:25

## 2018-12-24 RX ADMIN — DILTIAZEM HYDROCHLORIDE 180 MG: 180 CAPSULE, COATED, EXTENDED RELEASE ORAL at 09:06

## 2018-12-24 RX ADMIN — TORSEMIDE 100 MG: 100 TABLET ORAL at 09:06

## 2018-12-24 RX ADMIN — ALBUTEROL SULFATE 2.5 MG: 2.5 SOLUTION RESPIRATORY (INHALATION) at 02:15

## 2018-12-24 RX ADMIN — HEPARIN SODIUM 5000 UNITS: 5000 INJECTION INTRAVENOUS; SUBCUTANEOUS at 22:18

## 2018-12-24 RX ADMIN — Medication 10 ML: at 09:38

## 2018-12-24 ASSESSMENT — PAIN DESCRIPTION - PAIN TYPE: TYPE: ACUTE PAIN

## 2018-12-24 ASSESSMENT — PAIN SCALES - GENERAL
PAINLEVEL_OUTOF10: 9
PAINLEVEL_OUTOF10: 10
PAINLEVEL_OUTOF10: 8
PAINLEVEL_OUTOF10: 7
PAINLEVEL_OUTOF10: 4
PAINLEVEL_OUTOF10: 10
PAINLEVEL_OUTOF10: 0
PAINLEVEL_OUTOF10: 7

## 2018-12-24 ASSESSMENT — PAIN DESCRIPTION - LOCATION: LOCATION: CHEST

## 2018-12-25 LAB
ALBUMIN SERPL-MCNC: 3.1 G/DL (ref 3.4–5)
ANION GAP SERPL CALCULATED.3IONS-SCNC: 9 MMOL/L (ref 3–16)
APPEARANCE FLUID: CLEAR
BUN BLDV-MCNC: 36 MG/DL (ref 7–20)
CALCIUM SERPL-MCNC: 8.6 MG/DL (ref 8.3–10.6)
CELL COUNT FLUID TYPE: NORMAL
CHLORIDE BLD-SCNC: 97 MMOL/L (ref 99–110)
CLOT EVALUATION: NORMAL
CO2: 27 MMOL/L (ref 21–32)
COLOR FLUID: NORMAL
CREAT SERPL-MCNC: 3.3 MG/DL (ref 0.9–1.3)
ESTIMATED AVERAGE GLUCOSE: 200.1 MG/DL
GFR AFRICAN AMERICAN: 24
GFR NON-AFRICAN AMERICAN: 20
GLUCOSE BLD-MCNC: 166 MG/DL (ref 70–99)
GLUCOSE BLD-MCNC: 185 MG/DL (ref 70–99)
GLUCOSE BLD-MCNC: 193 MG/DL (ref 70–99)
GLUCOSE BLD-MCNC: 225 MG/DL (ref 70–99)
GLUCOSE BLD-MCNC: 225 MG/DL (ref 70–99)
HBA1C MFR BLD: 8.6 %
HCT VFR BLD CALC: 32.1 % (ref 40.5–52.5)
HEMOGLOBIN: 10.4 G/DL (ref 13.5–17.5)
LYMPHOCYTES, BODY FLUID: 2 %
MCH RBC QN AUTO: 30.2 PG (ref 26–34)
MCHC RBC AUTO-ENTMCNC: 32.4 G/DL (ref 31–36)
MCV RBC AUTO: 93.1 FL (ref 80–100)
MONOCYTE, FLUID: 6 %
NEUTROPHIL, FLUID: 92 %
NUCLEATED CELLS FLUID: 320 /CUMM
NUMBER OF CELLS COUNTED FLUID: 100
PDW BLD-RTO: 16.1 % (ref 12.4–15.4)
PERFORMED ON: ABNORMAL
PHOSPHORUS: 4.1 MG/DL (ref 2.5–4.9)
PLATELET # BLD: 223 K/UL (ref 135–450)
PMV BLD AUTO: 8 FL (ref 5–10.5)
POTASSIUM SERPL-SCNC: 4.9 MMOL/L (ref 3.5–5.1)
RBC # BLD: 3.45 M/UL (ref 4.2–5.9)
RBC FLUID: 61 /CUMM
SODIUM BLD-SCNC: 133 MMOL/L (ref 136–145)
WBC # BLD: 12.3 K/UL (ref 4–11)

## 2018-12-25 PROCEDURE — 2580000003 HC RX 258: Performed by: INTERNAL MEDICINE

## 2018-12-25 PROCEDURE — 2060000000 HC ICU INTERMEDIATE R&B

## 2018-12-25 PROCEDURE — 6370000000 HC RX 637 (ALT 250 FOR IP): Performed by: INTERNAL MEDICINE

## 2018-12-25 PROCEDURE — 2700000000 HC OXYGEN THERAPY PER DAY

## 2018-12-25 PROCEDURE — 36415 COLL VENOUS BLD VENIPUNCTURE: CPT

## 2018-12-25 PROCEDURE — 85027 COMPLETE CBC AUTOMATED: CPT

## 2018-12-25 PROCEDURE — 94761 N-INVAS EAR/PLS OXIMETRY MLT: CPT

## 2018-12-25 PROCEDURE — 6360000002 HC RX W HCPCS: Performed by: INTERNAL MEDICINE

## 2018-12-25 PROCEDURE — 6370000000 HC RX 637 (ALT 250 FOR IP): Performed by: NURSE PRACTITIONER

## 2018-12-25 PROCEDURE — 80069 RENAL FUNCTION PANEL: CPT

## 2018-12-25 PROCEDURE — 94640 AIRWAY INHALATION TREATMENT: CPT

## 2018-12-25 RX ORDER — SODIUM CHLORIDE 0.9 % (FLUSH) 0.9 %
SYRINGE (ML) INJECTION
Status: DISPENSED
Start: 2018-12-25 | End: 2018-12-26

## 2018-12-25 RX ORDER — SENNA PLUS 8.6 MG/1
1 TABLET ORAL DAILY
Status: DISCONTINUED | OUTPATIENT
Start: 2018-12-25 | End: 2018-12-28 | Stop reason: HOSPADM

## 2018-12-25 RX ORDER — CALCIUM CARBONATE 200(500)MG
500 TABLET,CHEWABLE ORAL 3 TIMES DAILY PRN
Status: DISCONTINUED | OUTPATIENT
Start: 2018-12-25 | End: 2018-12-28 | Stop reason: HOSPADM

## 2018-12-25 RX ADMIN — ONDANSETRON 4 MG: 2 INJECTION INTRAMUSCULAR; INTRAVENOUS at 08:22

## 2018-12-25 RX ADMIN — HEPARIN SODIUM 5000 UNITS: 5000 INJECTION INTRAVENOUS; SUBCUTANEOUS at 06:03

## 2018-12-25 RX ADMIN — HEPARIN SODIUM 5000 UNITS: 5000 INJECTION INTRAVENOUS; SUBCUTANEOUS at 21:13

## 2018-12-25 RX ADMIN — INSULIN LISPRO 3 UNITS: 100 INJECTION, SOLUTION INTRAVENOUS; SUBCUTANEOUS at 21:13

## 2018-12-25 RX ADMIN — LOSARTAN POTASSIUM 50 MG: 25 TABLET, FILM COATED ORAL at 08:28

## 2018-12-25 RX ADMIN — SODIUM CHLORIDE, SODIUM LACTATE, CALCIUM CHLORIDE, MAGNESIUM CHLORIDE AND DEXTROSE 2000 ML: 2.5; 538; 448; 18.3; 5.08 INJECTION, SOLUTION INTRAPERITONEAL at 18:05

## 2018-12-25 RX ADMIN — CARVEDILOL 12.5 MG: 6.25 TABLET, FILM COATED ORAL at 16:52

## 2018-12-25 RX ADMIN — CITALOPRAM HYDROBROMIDE 40 MG: 20 TABLET ORAL at 08:28

## 2018-12-25 RX ADMIN — Medication 10 ML: at 22:20

## 2018-12-25 RX ADMIN — ISOSORBIDE MONONITRATE 30 MG: 30 TABLET, EXTENDED RELEASE ORAL at 08:28

## 2018-12-25 RX ADMIN — IPRATROPIUM BROMIDE AND ALBUTEROL SULFATE 1 AMPULE: .5; 3 SOLUTION RESPIRATORY (INHALATION) at 09:01

## 2018-12-25 RX ADMIN — INSULIN LISPRO 3 UNITS: 100 INJECTION, SOLUTION INTRAVENOUS; SUBCUTANEOUS at 13:49

## 2018-12-25 RX ADMIN — GABAPENTIN 100 MG: 100 CAPSULE ORAL at 08:28

## 2018-12-25 RX ADMIN — TRAZODONE HYDROCHLORIDE 150 MG: 50 TABLET ORAL at 21:12

## 2018-12-25 RX ADMIN — ASPIRIN 81 MG 81 MG: 81 TABLET ORAL at 08:28

## 2018-12-25 RX ADMIN — ANTACID TABLETS 500 MG: 500 TABLET, CHEWABLE ORAL at 18:37

## 2018-12-25 RX ADMIN — OXYCODONE HYDROCHLORIDE AND ACETAMINOPHEN 1 TABLET: 7.5; 325 TABLET ORAL at 13:48

## 2018-12-25 RX ADMIN — INSULIN LISPRO 3 UNITS: 100 INJECTION, SOLUTION INTRAVENOUS; SUBCUTANEOUS at 08:29

## 2018-12-25 RX ADMIN — LIDOCAINE HYDROCHLORIDE: 20 SOLUTION ORAL; TOPICAL at 23:05

## 2018-12-25 RX ADMIN — Medication 10 ML: at 11:00

## 2018-12-25 RX ADMIN — GABAPENTIN 100 MG: 100 CAPSULE ORAL at 21:12

## 2018-12-25 RX ADMIN — INSULIN LISPRO 6 UNITS: 100 INJECTION, SOLUTION INTRAVENOUS; SUBCUTANEOUS at 17:28

## 2018-12-25 RX ADMIN — DILTIAZEM HYDROCHLORIDE 180 MG: 180 CAPSULE, COATED, EXTENDED RELEASE ORAL at 08:28

## 2018-12-25 RX ADMIN — TORSEMIDE 100 MG: 100 TABLET ORAL at 08:28

## 2018-12-25 RX ADMIN — HEPARIN SODIUM 5000 UNITS: 5000 INJECTION INTRAVENOUS; SUBCUTANEOUS at 16:51

## 2018-12-25 RX ADMIN — CARVEDILOL 12.5 MG: 6.25 TABLET, FILM COATED ORAL at 08:28

## 2018-12-25 RX ADMIN — IPRATROPIUM BROMIDE AND ALBUTEROL SULFATE 1 AMPULE: .5; 3 SOLUTION RESPIRATORY (INHALATION) at 16:38

## 2018-12-25 RX ADMIN — ANTACID TABLETS 500 MG: 500 TABLET, CHEWABLE ORAL at 13:48

## 2018-12-25 RX ADMIN — OXYCODONE HYDROCHLORIDE AND ACETAMINOPHEN 1 TABLET: 7.5; 325 TABLET ORAL at 19:50

## 2018-12-25 RX ADMIN — GABAPENTIN 100 MG: 100 CAPSULE ORAL at 16:52

## 2018-12-25 RX ADMIN — PRAVASTATIN SODIUM 40 MG: 40 TABLET ORAL at 08:28

## 2018-12-25 RX ADMIN — INSULIN GLARGINE 10 UNITS: 100 INJECTION, SOLUTION SUBCUTANEOUS at 23:53

## 2018-12-25 RX ADMIN — SODIUM CHLORIDE, SODIUM LACTATE, CALCIUM CHLORIDE, MAGNESIUM CHLORIDE AND DEXTROSE 2000 ML: 2.5; 538; 448; 18.3; 5.08 INJECTION, SOLUTION INTRAPERITONEAL at 13:55

## 2018-12-25 RX ADMIN — IPRATROPIUM BROMIDE AND ALBUTEROL SULFATE 1 AMPULE: .5; 3 SOLUTION RESPIRATORY (INHALATION) at 13:07

## 2018-12-25 RX ADMIN — CLOPIDOGREL BISULFATE 75 MG: 75 TABLET ORAL at 08:28

## 2018-12-25 RX ADMIN — SODIUM CHLORIDE, SODIUM LACTATE, CALCIUM CHLORIDE, MAGNESIUM CHLORIDE AND DEXTROSE 2000 ML: 2.5; 538; 448; 18.3; 5.08 INJECTION, SOLUTION INTRAPERITONEAL at 22:11

## 2018-12-25 RX ADMIN — PANTOPRAZOLE SODIUM 40 MG: 40 TABLET, DELAYED RELEASE ORAL at 06:02

## 2018-12-25 RX ADMIN — ANTACID TABLETS 500 MG: 500 TABLET, CHEWABLE ORAL at 19:48

## 2018-12-25 ASSESSMENT — PAIN SCALES - GENERAL
PAINLEVEL_OUTOF10: 9
PAINLEVEL_OUTOF10: 8
PAINLEVEL_OUTOF10: 8
PAINLEVEL_OUTOF10: 7
PAINLEVEL_OUTOF10: 7

## 2018-12-26 LAB
ACQUISITION DURATION: NORMAL S
ALBUMIN SERPL-MCNC: 3.5 G/DL (ref 3.4–5)
ANION GAP SERPL CALCULATED.3IONS-SCNC: 12 MMOL/L (ref 3–16)
AVERAGE HEART RATE: 76 BPM
BUN BLDV-MCNC: 55 MG/DL (ref 7–20)
CALCIUM SERPL-MCNC: 9 MG/DL (ref 8.3–10.6)
CHLORIDE BLD-SCNC: 91 MMOL/L (ref 99–110)
CO2: 27 MMOL/L (ref 21–32)
CREAT SERPL-MCNC: 4.1 MG/DL (ref 0.9–1.3)
EKG ATRIAL RATE: 74 BPM
EKG DIAGNOSIS: NORMAL
EKG DIAGNOSIS: NORMAL
EKG P AXIS: 112 DEGREES
EKG P-R INTERVAL: 156 MS
EKG Q-T INTERVAL: 450 MS
EKG QRS DURATION: 100 MS
EKG QTC CALCULATION (BAZETT): 499 MS
EKG R AXIS: 14 DEGREES
EKG T AXIS: 83 DEGREES
EKG VENTRICULAR RATE: 74 BPM
GFR AFRICAN AMERICAN: 19
GFR NON-AFRICAN AMERICAN: 15
GLUCOSE BLD-MCNC: 133 MG/DL (ref 70–99)
GLUCOSE BLD-MCNC: 162 MG/DL (ref 70–99)
GLUCOSE BLD-MCNC: 196 MG/DL (ref 70–99)
GLUCOSE BLD-MCNC: 242 MG/DL (ref 70–99)
GLUCOSE BLD-MCNC: 255 MG/DL (ref 70–99)
HCT VFR BLD CALC: 34.3 % (ref 40.5–52.5)
HEMOGLOBIN: 11.2 G/DL (ref 13.5–17.5)
HOLTER MAX HEART RATE: 93 BPM
HOOKUP DATE: NORMAL
HOOKUP TIME: NORMAL
MAX HEART RATE TIME/DATE: NORMAL
MCH RBC QN AUTO: 30.2 PG (ref 26–34)
MCHC RBC AUTO-ENTMCNC: 32.7 G/DL (ref 31–36)
MCV RBC AUTO: 92.3 FL (ref 80–100)
MIN HEART RATE TIME/DATE: NORMAL
MIN HEART RATE: 64 BPM
NUMBER OF QRS COMPLEXES: NORMAL
NUMBER OF SUPRAVENTRICULAR BEATS IN RUNS: 0
NUMBER OF SUPRAVENTRICULAR COUPLETS: 0
NUMBER OF SUPRAVENTRICULAR ECTOPICS: 246
NUMBER OF SUPRAVENTRICULAR ISOLATED BEATS: 246
NUMBER OF SUPRAVENTRICULAR RUNS: 0
NUMBER OF VENTRICULAR BEATS IN RUNS: 0
NUMBER OF VENTRICULAR BIGEMINAL CYCLES: 14
NUMBER OF VENTRICULAR COUPLETS: 0
NUMBER OF VENTRICULAR ECTOPICS: 173
NUMBER OF VENTRICULAR ISOLATED BEATS: 172
NUMBER OF VENTRICULAR RUNS: 0
PDW BLD-RTO: 16.2 % (ref 12.4–15.4)
PERFORMED ON: ABNORMAL
PHOSPHORUS: 4.3 MG/DL (ref 2.5–4.9)
PLATELET # BLD: 260 K/UL (ref 135–450)
PMV BLD AUTO: 8 FL (ref 5–10.5)
POTASSIUM SERPL-SCNC: 5.2 MMOL/L (ref 3.5–5.1)
RBC # BLD: 3.72 M/UL (ref 4.2–5.9)
SODIUM BLD-SCNC: 130 MMOL/L (ref 136–145)
TROPONIN: 0.03 NG/ML
TROPONIN: 0.04 NG/ML
WBC # BLD: 11.6 K/UL (ref 4–11)

## 2018-12-26 PROCEDURE — 93010 ELECTROCARDIOGRAM REPORT: CPT | Performed by: INTERNAL MEDICINE

## 2018-12-26 PROCEDURE — 94761 N-INVAS EAR/PLS OXIMETRY MLT: CPT

## 2018-12-26 PROCEDURE — 6360000002 HC RX W HCPCS: Performed by: INTERNAL MEDICINE

## 2018-12-26 PROCEDURE — 36415 COLL VENOUS BLD VENIPUNCTURE: CPT

## 2018-12-26 PROCEDURE — 6370000000 HC RX 637 (ALT 250 FOR IP): Performed by: INTERNAL MEDICINE

## 2018-12-26 PROCEDURE — 80069 RENAL FUNCTION PANEL: CPT

## 2018-12-26 PROCEDURE — 90937 HEMODIALYSIS REPEATED EVAL: CPT

## 2018-12-26 PROCEDURE — 87070 CULTURE OTHR SPECIMN AEROBIC: CPT

## 2018-12-26 PROCEDURE — 93005 ELECTROCARDIOGRAM TRACING: CPT | Performed by: NURSE PRACTITIONER

## 2018-12-26 PROCEDURE — 94664 DEMO&/EVAL PT USE INHALER: CPT

## 2018-12-26 PROCEDURE — 6370000000 HC RX 637 (ALT 250 FOR IP): Performed by: NURSE PRACTITIONER

## 2018-12-26 PROCEDURE — 94640 AIRWAY INHALATION TREATMENT: CPT

## 2018-12-26 PROCEDURE — 2580000003 HC RX 258: Performed by: INTERNAL MEDICINE

## 2018-12-26 PROCEDURE — 84484 ASSAY OF TROPONIN QUANT: CPT

## 2018-12-26 PROCEDURE — 2060000000 HC ICU INTERMEDIATE R&B

## 2018-12-26 PROCEDURE — 2700000000 HC OXYGEN THERAPY PER DAY

## 2018-12-26 PROCEDURE — 87205 SMEAR GRAM STAIN: CPT

## 2018-12-26 PROCEDURE — 85027 COMPLETE CBC AUTOMATED: CPT

## 2018-12-26 RX ORDER — NITROGLYCERIN 0.4 MG/1
0.4 TABLET SUBLINGUAL EVERY 5 MIN PRN
Status: DISCONTINUED | OUTPATIENT
Start: 2018-12-26 | End: 2018-12-26 | Stop reason: SDUPTHER

## 2018-12-26 RX ORDER — GABAPENTIN 100 MG/1
100 CAPSULE ORAL DAILY
Status: DISCONTINUED | OUTPATIENT
Start: 2018-12-27 | End: 2018-12-28 | Stop reason: HOSPADM

## 2018-12-26 RX ORDER — SENNA PLUS 8.6 MG/1
1 TABLET ORAL NIGHTLY
Status: DISCONTINUED | OUTPATIENT
Start: 2018-12-26 | End: 2018-12-28 | Stop reason: HOSPADM

## 2018-12-26 RX ADMIN — CITALOPRAM HYDROBROMIDE 40 MG: 20 TABLET ORAL at 09:15

## 2018-12-26 RX ADMIN — OXYCODONE HYDROCHLORIDE AND ACETAMINOPHEN 1 TABLET: 7.5; 325 TABLET ORAL at 21:44

## 2018-12-26 RX ADMIN — HEPARIN SODIUM 5000 UNITS: 5000 INJECTION INTRAVENOUS; SUBCUTANEOUS at 14:58

## 2018-12-26 RX ADMIN — SODIUM CHLORIDE, SODIUM LACTATE, CALCIUM CHLORIDE, MAGNESIUM CHLORIDE AND DEXTROSE 2000 ML: 2.5; 538; 448; 18.3; 5.08 INJECTION, SOLUTION INTRAPERITONEAL at 06:01

## 2018-12-26 RX ADMIN — OXYCODONE HYDROCHLORIDE AND ACETAMINOPHEN 1 TABLET: 7.5; 325 TABLET ORAL at 09:26

## 2018-12-26 RX ADMIN — INSULIN LISPRO 9 UNITS: 100 INJECTION, SOLUTION INTRAVENOUS; SUBCUTANEOUS at 09:37

## 2018-12-26 RX ADMIN — HEPARIN SODIUM 5000 UNITS: 5000 INJECTION INTRAVENOUS; SUBCUTANEOUS at 21:35

## 2018-12-26 RX ADMIN — NITROGLYCERIN 0.4 MG: 0.4 TABLET, ORALLY DISINTEGRATING SUBLINGUAL at 06:32

## 2018-12-26 RX ADMIN — INSULIN LISPRO 2 UNITS: 100 INJECTION, SOLUTION INTRAVENOUS; SUBCUTANEOUS at 21:36

## 2018-12-26 RX ADMIN — LIDOCAINE HYDROCHLORIDE: 20 SOLUTION ORAL; TOPICAL at 09:27

## 2018-12-26 RX ADMIN — CLOPIDOGREL BISULFATE 75 MG: 75 TABLET ORAL at 09:15

## 2018-12-26 RX ADMIN — Medication 10 ML: at 21:45

## 2018-12-26 RX ADMIN — HEPARIN SODIUM 3800 UNITS: 1000 INJECTION INTRAVENOUS; SUBCUTANEOUS at 14:20

## 2018-12-26 RX ADMIN — HEPARIN SODIUM 5000 UNITS: 5000 INJECTION INTRAVENOUS; SUBCUTANEOUS at 06:12

## 2018-12-26 RX ADMIN — ASPIRIN 81 MG 81 MG: 81 TABLET ORAL at 09:15

## 2018-12-26 RX ADMIN — NITROGLYCERIN 0.4 MG: 0.4 TABLET, ORALLY DISINTEGRATING SUBLINGUAL at 06:21

## 2018-12-26 RX ADMIN — TRAZODONE HYDROCHLORIDE 150 MG: 50 TABLET ORAL at 21:35

## 2018-12-26 RX ADMIN — SENNOSIDES 8.6 MG: 8.6 TABLET, FILM COATED ORAL at 09:15

## 2018-12-26 RX ADMIN — NITROGLYCERIN 0.4 MG: 0.4 TABLET, ORALLY DISINTEGRATING SUBLINGUAL at 03:32

## 2018-12-26 RX ADMIN — SENNOSIDES 8.6 MG: 8.6 TABLET, FILM COATED ORAL at 02:27

## 2018-12-26 RX ADMIN — PRAVASTATIN SODIUM 40 MG: 40 TABLET ORAL at 09:15

## 2018-12-26 RX ADMIN — INSULIN LISPRO 3 UNITS: 100 INJECTION, SOLUTION INTRAVENOUS; SUBCUTANEOUS at 18:32

## 2018-12-26 RX ADMIN — GABAPENTIN 100 MG: 100 CAPSULE ORAL at 09:30

## 2018-12-26 RX ADMIN — OXYCODONE HYDROCHLORIDE AND ACETAMINOPHEN 1 TABLET: 7.5; 325 TABLET ORAL at 15:04

## 2018-12-26 RX ADMIN — IPRATROPIUM BROMIDE AND ALBUTEROL SULFATE 1 AMPULE: .5; 3 SOLUTION RESPIRATORY (INHALATION) at 08:03

## 2018-12-26 RX ADMIN — Medication 10 ML: at 09:34

## 2018-12-26 RX ADMIN — NITROGLYCERIN 0.4 MG: 0.4 TABLET, ORALLY DISINTEGRATING SUBLINGUAL at 03:17

## 2018-12-26 RX ADMIN — PANTOPRAZOLE SODIUM 40 MG: 40 TABLET, DELAYED RELEASE ORAL at 09:40

## 2018-12-26 RX ADMIN — CEFEPIME HYDROCHLORIDE 2 G: 2 INJECTION, POWDER, FOR SOLUTION INTRAVENOUS at 13:45

## 2018-12-26 RX ADMIN — CARVEDILOL 12.5 MG: 6.25 TABLET, FILM COATED ORAL at 18:33

## 2018-12-26 RX ADMIN — ANTACID TABLETS 500 MG: 500 TABLET, CHEWABLE ORAL at 15:04

## 2018-12-26 RX ADMIN — IPRATROPIUM BROMIDE AND ALBUTEROL SULFATE 1 AMPULE: .5; 3 SOLUTION RESPIRATORY (INHALATION) at 20:54

## 2018-12-26 RX ADMIN — OXYCODONE HYDROCHLORIDE AND ACETAMINOPHEN 1 TABLET: 7.5; 325 TABLET ORAL at 02:22

## 2018-12-26 RX ADMIN — LIDOCAINE HYDROCHLORIDE: 20 SOLUTION ORAL; TOPICAL at 18:33

## 2018-12-26 RX ADMIN — INSULIN GLARGINE 10 UNITS: 100 INJECTION, SOLUTION SUBCUTANEOUS at 21:36

## 2018-12-26 RX ADMIN — ANTACID TABLETS 500 MG: 500 TABLET, CHEWABLE ORAL at 02:25

## 2018-12-26 RX ADMIN — SODIUM CHLORIDE, SODIUM LACTATE, CALCIUM CHLORIDE, MAGNESIUM CHLORIDE AND DEXTROSE 2000 ML: 2.5; 538; 448; 18.3; 5.08 INJECTION, SOLUTION INTRAPERITONEAL at 02:16

## 2018-12-26 RX ADMIN — VANCOMYCIN HYDROCHLORIDE 2000 MG: 1 INJECTION, POWDER, LYOPHILIZED, FOR SOLUTION INTRAVENOUS at 13:01

## 2018-12-26 ASSESSMENT — PAIN DESCRIPTION - PAIN TYPE
TYPE: ACUTE PAIN;CHRONIC PAIN
TYPE: CHRONIC PAIN

## 2018-12-26 ASSESSMENT — PAIN SCALES - GENERAL
PAINLEVEL_OUTOF10: 9
PAINLEVEL_OUTOF10: 4
PAINLEVEL_OUTOF10: 8
PAINLEVEL_OUTOF10: 8
PAINLEVEL_OUTOF10: 4
PAINLEVEL_OUTOF10: 8
PAINLEVEL_OUTOF10: 7
PAINLEVEL_OUTOF10: 8

## 2018-12-26 ASSESSMENT — PAIN DESCRIPTION - LOCATION
LOCATION: BACK
LOCATION: BACK;CHEST

## 2018-12-26 ASSESSMENT — PAIN DESCRIPTION - PROGRESSION
CLINICAL_PROGRESSION: NOT CHANGED

## 2018-12-27 LAB
ALBUMIN SERPL-MCNC: 3 G/DL (ref 3.4–5)
ANION GAP SERPL CALCULATED.3IONS-SCNC: 11 MMOL/L (ref 3–16)
BUN BLDV-MCNC: 35 MG/DL (ref 7–20)
CALCIUM SERPL-MCNC: 8.3 MG/DL (ref 8.3–10.6)
CHLORIDE BLD-SCNC: 100 MMOL/L (ref 99–110)
CO2: 22 MMOL/L (ref 21–32)
CREAT SERPL-MCNC: 3.3 MG/DL (ref 0.9–1.3)
GFR AFRICAN AMERICAN: 24
GFR NON-AFRICAN AMERICAN: 20
GLUCOSE BLD-MCNC: 112 MG/DL (ref 70–99)
GLUCOSE BLD-MCNC: 156 MG/DL (ref 70–99)
GLUCOSE BLD-MCNC: 161 MG/DL (ref 70–99)
GLUCOSE BLD-MCNC: 185 MG/DL (ref 70–99)
GLUCOSE BLD-MCNC: 240 MG/DL (ref 70–99)
GLUCOSE BLD-MCNC: 278 MG/DL (ref 70–99)
HCT VFR BLD CALC: 32.5 % (ref 40.5–52.5)
HEMOGLOBIN: 10.7 G/DL (ref 13.5–17.5)
MCH RBC QN AUTO: 30.7 PG (ref 26–34)
MCHC RBC AUTO-ENTMCNC: 33 G/DL (ref 31–36)
MCV RBC AUTO: 92.8 FL (ref 80–100)
PDW BLD-RTO: 16.2 % (ref 12.4–15.4)
PERFORMED ON: ABNORMAL
PHOSPHORUS: 2.8 MG/DL (ref 2.5–4.9)
PLATELET # BLD: 215 K/UL (ref 135–450)
PMV BLD AUTO: 7.9 FL (ref 5–10.5)
POTASSIUM SERPL-SCNC: 4.8 MMOL/L (ref 3.5–5.1)
RBC # BLD: 3.5 M/UL (ref 4.2–5.9)
SODIUM BLD-SCNC: 133 MMOL/L (ref 136–145)
VANCOMYCIN RANDOM: 10.8 UG/ML
WBC # BLD: 9.9 K/UL (ref 4–11)

## 2018-12-27 PROCEDURE — 94660 CPAP INITIATION&MGMT: CPT

## 2018-12-27 PROCEDURE — 6370000000 HC RX 637 (ALT 250 FOR IP): Performed by: INTERNAL MEDICINE

## 2018-12-27 PROCEDURE — 94761 N-INVAS EAR/PLS OXIMETRY MLT: CPT

## 2018-12-27 PROCEDURE — 2700000000 HC OXYGEN THERAPY PER DAY

## 2018-12-27 PROCEDURE — 80202 ASSAY OF VANCOMYCIN: CPT

## 2018-12-27 PROCEDURE — 6360000002 HC RX W HCPCS: Performed by: INTERNAL MEDICINE

## 2018-12-27 PROCEDURE — 36415 COLL VENOUS BLD VENIPUNCTURE: CPT

## 2018-12-27 PROCEDURE — 6370000000 HC RX 637 (ALT 250 FOR IP): Performed by: NURSE PRACTITIONER

## 2018-12-27 PROCEDURE — 94640 AIRWAY INHALATION TREATMENT: CPT

## 2018-12-27 PROCEDURE — 80069 RENAL FUNCTION PANEL: CPT

## 2018-12-27 PROCEDURE — 85027 COMPLETE CBC AUTOMATED: CPT

## 2018-12-27 PROCEDURE — 2580000003 HC RX 258: Performed by: INTERNAL MEDICINE

## 2018-12-27 PROCEDURE — 2060000000 HC ICU INTERMEDIATE R&B

## 2018-12-27 RX ADMIN — HEPARIN SODIUM 5000 UNITS: 5000 INJECTION INTRAVENOUS; SUBCUTANEOUS at 13:58

## 2018-12-27 RX ADMIN — IPRATROPIUM BROMIDE AND ALBUTEROL SULFATE 1 AMPULE: .5; 3 SOLUTION RESPIRATORY (INHALATION) at 20:58

## 2018-12-27 RX ADMIN — CITALOPRAM HYDROBROMIDE 40 MG: 20 TABLET ORAL at 11:30

## 2018-12-27 RX ADMIN — OXYCODONE HYDROCHLORIDE AND ACETAMINOPHEN 1 TABLET: 7.5; 325 TABLET ORAL at 15:32

## 2018-12-27 RX ADMIN — OXYCODONE HYDROCHLORIDE AND ACETAMINOPHEN 1 TABLET: 7.5; 325 TABLET ORAL at 20:42

## 2018-12-27 RX ADMIN — Medication 10 ML: at 11:39

## 2018-12-27 RX ADMIN — ONDANSETRON 4 MG: 2 INJECTION INTRAMUSCULAR; INTRAVENOUS at 20:42

## 2018-12-27 RX ADMIN — ONDANSETRON 4 MG: 2 INJECTION INTRAMUSCULAR; INTRAVENOUS at 13:26

## 2018-12-27 RX ADMIN — CARVEDILOL 12.5 MG: 6.25 TABLET, FILM COATED ORAL at 11:32

## 2018-12-27 RX ADMIN — CLOPIDOGREL BISULFATE 75 MG: 75 TABLET ORAL at 11:32

## 2018-12-27 RX ADMIN — TORSEMIDE 100 MG: 100 TABLET ORAL at 11:30

## 2018-12-27 RX ADMIN — TRAZODONE HYDROCHLORIDE 150 MG: 50 TABLET ORAL at 22:27

## 2018-12-27 RX ADMIN — LOSARTAN POTASSIUM 50 MG: 25 TABLET, FILM COATED ORAL at 11:31

## 2018-12-27 RX ADMIN — SENNOSIDES 8.6 MG: 8.6 TABLET, FILM COATED ORAL at 11:31

## 2018-12-27 RX ADMIN — DILTIAZEM HYDROCHLORIDE 180 MG: 180 CAPSULE, COATED, EXTENDED RELEASE ORAL at 11:32

## 2018-12-27 RX ADMIN — IPRATROPIUM BROMIDE AND ALBUTEROL SULFATE 1 AMPULE: .5; 3 SOLUTION RESPIRATORY (INHALATION) at 16:02

## 2018-12-27 RX ADMIN — INSULIN LISPRO 3 UNITS: 100 INJECTION, SOLUTION INTRAVENOUS; SUBCUTANEOUS at 11:40

## 2018-12-27 RX ADMIN — GABAPENTIN 100 MG: 100 CAPSULE ORAL at 11:32

## 2018-12-27 RX ADMIN — HEPARIN SODIUM 3800 UNITS: 1000 INJECTION INTRAVENOUS; SUBCUTANEOUS at 10:40

## 2018-12-27 RX ADMIN — SENNOSIDES 8.6 MG: 8.6 TABLET, FILM COATED ORAL at 22:27

## 2018-12-27 RX ADMIN — OXYCODONE HYDROCHLORIDE AND ACETAMINOPHEN 1 TABLET: 7.5; 325 TABLET ORAL at 11:32

## 2018-12-27 RX ADMIN — PRAVASTATIN SODIUM 40 MG: 40 TABLET ORAL at 11:31

## 2018-12-27 RX ADMIN — ISOSORBIDE MONONITRATE 30 MG: 30 TABLET, EXTENDED RELEASE ORAL at 11:31

## 2018-12-27 RX ADMIN — INSULIN LISPRO 6 UNITS: 100 INJECTION, SOLUTION INTRAVENOUS; SUBCUTANEOUS at 17:39

## 2018-12-27 RX ADMIN — HEPARIN SODIUM 5000 UNITS: 5000 INJECTION INTRAVENOUS; SUBCUTANEOUS at 22:27

## 2018-12-27 RX ADMIN — ASPIRIN 81 MG 81 MG: 81 TABLET ORAL at 11:32

## 2018-12-27 RX ADMIN — VANCOMYCIN HYDROCHLORIDE 1000 MG: 1 INJECTION, POWDER, LYOPHILIZED, FOR SOLUTION INTRAVENOUS at 09:38

## 2018-12-27 RX ADMIN — OXYCODONE HYDROCHLORIDE AND ACETAMINOPHEN 1 TABLET: 7.5; 325 TABLET ORAL at 06:08

## 2018-12-27 RX ADMIN — HEPARIN SODIUM 5000 UNITS: 5000 INJECTION INTRAVENOUS; SUBCUTANEOUS at 06:08

## 2018-12-27 RX ADMIN — CEFEPIME HYDROCHLORIDE 2 G: 2 INJECTION, POWDER, FOR SOLUTION INTRAVENOUS at 10:11

## 2018-12-27 RX ADMIN — PANTOPRAZOLE SODIUM 40 MG: 40 TABLET, DELAYED RELEASE ORAL at 06:08

## 2018-12-27 ASSESSMENT — PAIN DESCRIPTION - PAIN TYPE
TYPE: CHRONIC PAIN

## 2018-12-27 ASSESSMENT — PAIN DESCRIPTION - LOCATION
LOCATION: BACK

## 2018-12-27 ASSESSMENT — ACTIVITIES OF DAILY LIVING (ADL): EFFECT OF PAIN ON DAILY ACTIVITIES: QUALITY OF LIFE

## 2018-12-27 ASSESSMENT — PAIN SCALES - GENERAL
PAINLEVEL_OUTOF10: 10
PAINLEVEL_OUTOF10: 9
PAINLEVEL_OUTOF10: 10
PAINLEVEL_OUTOF10: 8
PAINLEVEL_OUTOF10: 9
PAINLEVEL_OUTOF10: 9
PAINLEVEL_OUTOF10: 8
PAINLEVEL_OUTOF10: 9

## 2018-12-27 ASSESSMENT — PAIN DESCRIPTION - FREQUENCY
FREQUENCY: INTERMITTENT
FREQUENCY: CONTINUOUS
FREQUENCY: INTERMITTENT

## 2018-12-27 ASSESSMENT — PAIN DESCRIPTION - DESCRIPTORS
DESCRIPTORS: ACHING
DESCRIPTORS: ACHING;DISCOMFORT
DESCRIPTORS: ACHING

## 2018-12-27 ASSESSMENT — PAIN DESCRIPTION - ONSET: ONSET: ON-GOING

## 2018-12-27 ASSESSMENT — PAIN DESCRIPTION - ORIENTATION: ORIENTATION: LOWER

## 2018-12-27 ASSESSMENT — PAIN DESCRIPTION - PROGRESSION: CLINICAL_PROGRESSION: NOT CHANGED

## 2018-12-28 VITALS
RESPIRATION RATE: 18 BRPM | HEART RATE: 72 BPM | SYSTOLIC BLOOD PRESSURE: 132 MMHG | DIASTOLIC BLOOD PRESSURE: 70 MMHG | BODY MASS INDEX: 30.79 KG/M2 | TEMPERATURE: 98.2 F | WEIGHT: 207.89 LBS | OXYGEN SATURATION: 95 % | HEIGHT: 69 IN

## 2018-12-28 LAB
ALBUMIN SERPL-MCNC: 3.1 G/DL (ref 3.4–5)
ANION GAP SERPL CALCULATED.3IONS-SCNC: 13 MMOL/L (ref 3–16)
BUN BLDV-MCNC: 40 MG/DL (ref 7–20)
CALCIUM SERPL-MCNC: 8.5 MG/DL (ref 8.3–10.6)
CHLORIDE BLD-SCNC: 90 MMOL/L (ref 99–110)
CO2: 25 MMOL/L (ref 21–32)
CREAT SERPL-MCNC: 3.7 MG/DL (ref 0.9–1.3)
GFR AFRICAN AMERICAN: 21
GFR NON-AFRICAN AMERICAN: 17
GLUCOSE BLD-MCNC: 128 MG/DL (ref 70–99)
GLUCOSE BLD-MCNC: 173 MG/DL (ref 70–99)
HCT VFR BLD CALC: 32.5 % (ref 40.5–52.5)
HEMOGLOBIN: 10.9 G/DL (ref 13.5–17.5)
MCH RBC QN AUTO: 30.7 PG (ref 26–34)
MCHC RBC AUTO-ENTMCNC: 33.4 G/DL (ref 31–36)
MCV RBC AUTO: 91.7 FL (ref 80–100)
PDW BLD-RTO: 15.8 % (ref 12.4–15.4)
PERFORMED ON: ABNORMAL
PHOSPHORUS: 3.1 MG/DL (ref 2.5–4.9)
PLATELET # BLD: 214 K/UL (ref 135–450)
PMV BLD AUTO: 7.9 FL (ref 5–10.5)
POTASSIUM SERPL-SCNC: 4 MMOL/L (ref 3.5–5.1)
RBC # BLD: 3.54 M/UL (ref 4.2–5.9)
SODIUM BLD-SCNC: 128 MMOL/L (ref 136–145)
VANCOMYCIN RANDOM: 13.4 UG/ML
WBC # BLD: 10.4 K/UL (ref 4–11)

## 2018-12-28 PROCEDURE — 80069 RENAL FUNCTION PANEL: CPT

## 2018-12-28 PROCEDURE — 36415 COLL VENOUS BLD VENIPUNCTURE: CPT

## 2018-12-28 PROCEDURE — 94761 N-INVAS EAR/PLS OXIMETRY MLT: CPT

## 2018-12-28 PROCEDURE — 2700000000 HC OXYGEN THERAPY PER DAY

## 2018-12-28 PROCEDURE — 85027 COMPLETE CBC AUTOMATED: CPT

## 2018-12-28 PROCEDURE — 6370000000 HC RX 637 (ALT 250 FOR IP): Performed by: INTERNAL MEDICINE

## 2018-12-28 PROCEDURE — 6360000002 HC RX W HCPCS: Performed by: INTERNAL MEDICINE

## 2018-12-28 PROCEDURE — 6370000000 HC RX 637 (ALT 250 FOR IP): Performed by: NURSE PRACTITIONER

## 2018-12-28 PROCEDURE — 80202 ASSAY OF VANCOMYCIN: CPT

## 2018-12-28 PROCEDURE — 2580000003 HC RX 258: Performed by: INTERNAL MEDICINE

## 2018-12-28 PROCEDURE — 90937 HEMODIALYSIS REPEATED EVAL: CPT

## 2018-12-28 PROCEDURE — 94660 CPAP INITIATION&MGMT: CPT

## 2018-12-28 RX ADMIN — SENNOSIDES 8.6 MG: 8.6 TABLET, FILM COATED ORAL at 08:17

## 2018-12-28 RX ADMIN — GABAPENTIN 100 MG: 100 CAPSULE ORAL at 08:18

## 2018-12-28 RX ADMIN — CITALOPRAM HYDROBROMIDE 40 MG: 20 TABLET ORAL at 08:18

## 2018-12-28 RX ADMIN — HEPARIN SODIUM 3800 UNITS: 1000 INJECTION INTRAVENOUS; SUBCUTANEOUS at 13:04

## 2018-12-28 RX ADMIN — INSULIN GLARGINE 10 UNITS: 100 INJECTION, SOLUTION SUBCUTANEOUS at 01:32

## 2018-12-28 RX ADMIN — PANTOPRAZOLE SODIUM 40 MG: 40 TABLET, DELAYED RELEASE ORAL at 08:03

## 2018-12-28 RX ADMIN — Medication 10 ML: at 08:22

## 2018-12-28 RX ADMIN — OXYCODONE HYDROCHLORIDE AND ACETAMINOPHEN 1 TABLET: 7.5; 325 TABLET ORAL at 08:02

## 2018-12-28 RX ADMIN — HEPARIN SODIUM 5000 UNITS: 5000 INJECTION INTRAVENOUS; SUBCUTANEOUS at 07:40

## 2018-12-28 RX ADMIN — HEPARIN SODIUM 5000 UNITS: 5000 INJECTION INTRAVENOUS; SUBCUTANEOUS at 13:54

## 2018-12-28 RX ADMIN — OXYCODONE HYDROCHLORIDE AND ACETAMINOPHEN 1 TABLET: 7.5; 325 TABLET ORAL at 01:25

## 2018-12-28 RX ADMIN — ASPIRIN 81 MG 81 MG: 81 TABLET ORAL at 08:18

## 2018-12-28 RX ADMIN — CLOPIDOGREL BISULFATE 75 MG: 75 TABLET ORAL at 08:18

## 2018-12-28 RX ADMIN — Medication 10 ML: at 01:35

## 2018-12-28 RX ADMIN — PRAVASTATIN SODIUM 40 MG: 40 TABLET ORAL at 08:18

## 2018-12-28 ASSESSMENT — PAIN SCALES - GENERAL
PAINLEVEL_OUTOF10: 9
PAINLEVEL_OUTOF10: 0
PAINLEVEL_OUTOF10: 8
PAINLEVEL_OUTOF10: 9

## 2018-12-28 ASSESSMENT — PAIN DESCRIPTION - ORIENTATION
ORIENTATION: LOWER
ORIENTATION: LOWER

## 2018-12-28 ASSESSMENT — PAIN DESCRIPTION - LOCATION
LOCATION: BACK
LOCATION: BACK

## 2018-12-28 ASSESSMENT — PAIN DESCRIPTION - PAIN TYPE
TYPE: CHRONIC PAIN
TYPE: CHRONIC PAIN

## 2018-12-28 ASSESSMENT — PAIN DESCRIPTION - FREQUENCY: FREQUENCY: INTERMITTENT

## 2018-12-28 ASSESSMENT — PAIN DESCRIPTION - PROGRESSION: CLINICAL_PROGRESSION: NOT CHANGED

## 2018-12-28 ASSESSMENT — PAIN DESCRIPTION - ONSET: ONSET: ON-GOING

## 2018-12-28 ASSESSMENT — PAIN DESCRIPTION - DESCRIPTORS: DESCRIPTORS: ACHING;DISCOMFORT

## 2018-12-28 ASSESSMENT — ACTIVITIES OF DAILY LIVING (ADL): EFFECT OF PAIN ON DAILY ACTIVITIES: QUALITY OF LIFE

## 2018-12-29 LAB
BODY FLUID CULTURE, STERILE: NORMAL
GRAM STAIN RESULT: NORMAL

## 2018-12-31 ENCOUNTER — TELEPHONE (OUTPATIENT)
Dept: TELEMETRY | Age: 48
End: 2018-12-31

## 2019-01-02 ENCOUNTER — TELEPHONE (OUTPATIENT)
Dept: FAMILY MEDICINE CLINIC | Age: 49
End: 2019-01-02

## 2019-01-07 ENCOUNTER — OFFICE VISIT (OUTPATIENT)
Dept: FAMILY MEDICINE CLINIC | Age: 49
End: 2019-01-07
Payer: MEDICARE

## 2019-01-07 VITALS
OXYGEN SATURATION: 99 % | HEART RATE: 84 BPM | BODY MASS INDEX: 31.6 KG/M2 | DIASTOLIC BLOOD PRESSURE: 66 MMHG | SYSTOLIC BLOOD PRESSURE: 146 MMHG | WEIGHT: 214 LBS

## 2019-01-07 DIAGNOSIS — F32.A DEPRESSION, UNSPECIFIED DEPRESSION TYPE: ICD-10-CM

## 2019-01-07 DIAGNOSIS — F32.A DEPRESSION, UNSPECIFIED DEPRESSION TYPE: Primary | ICD-10-CM

## 2019-01-07 PROCEDURE — 4004F PT TOBACCO SCREEN RCVD TLK: CPT | Performed by: FAMILY MEDICINE

## 2019-01-07 PROCEDURE — G8417 CALC BMI ABV UP PARAM F/U: HCPCS | Performed by: FAMILY MEDICINE

## 2019-01-07 PROCEDURE — G8482 FLU IMMUNIZE ORDER/ADMIN: HCPCS | Performed by: FAMILY MEDICINE

## 2019-01-07 PROCEDURE — 99213 OFFICE O/P EST LOW 20 MIN: CPT | Performed by: FAMILY MEDICINE

## 2019-01-07 PROCEDURE — G8598 ASA/ANTIPLAT THER USED: HCPCS | Performed by: FAMILY MEDICINE

## 2019-01-07 PROCEDURE — 1111F DSCHRG MED/CURRENT MED MERGE: CPT | Performed by: FAMILY MEDICINE

## 2019-01-07 PROCEDURE — G8427 DOCREV CUR MEDS BY ELIG CLIN: HCPCS | Performed by: FAMILY MEDICINE

## 2019-01-07 PROCEDURE — 3017F COLORECTAL CA SCREEN DOC REV: CPT | Performed by: FAMILY MEDICINE

## 2019-01-07 RX ORDER — DULOXETIN HYDROCHLORIDE 30 MG/1
30 CAPSULE, DELAYED RELEASE ORAL DAILY
Qty: 30 CAPSULE | Refills: 5 | Status: SHIPPED | OUTPATIENT
Start: 2019-01-07 | End: 2019-01-07 | Stop reason: SDUPTHER

## 2019-01-07 RX ORDER — DULOXETIN HYDROCHLORIDE 30 MG/1
30 CAPSULE, DELAYED RELEASE ORAL DAILY
Qty: 90 CAPSULE | Refills: 5 | Status: SHIPPED | OUTPATIENT
Start: 2019-01-07 | End: 2019-03-28 | Stop reason: SDUPTHER

## 2019-01-17 ENCOUNTER — TELEPHONE (OUTPATIENT)
Dept: PULMONOLOGY | Age: 49
End: 2019-01-17

## 2019-01-22 ENCOUNTER — OFFICE VISIT (OUTPATIENT)
Dept: PULMONOLOGY | Age: 49
End: 2019-01-22
Payer: MEDICARE

## 2019-01-22 VITALS
BODY MASS INDEX: 32.29 KG/M2 | WEIGHT: 218 LBS | HEART RATE: 81 BPM | HEIGHT: 69 IN | SYSTOLIC BLOOD PRESSURE: 125 MMHG | TEMPERATURE: 98.2 F | DIASTOLIC BLOOD PRESSURE: 70 MMHG | RESPIRATION RATE: 16 BRPM | OXYGEN SATURATION: 97 %

## 2019-01-22 DIAGNOSIS — Z99.89 OSA ON CPAP: Chronic | ICD-10-CM

## 2019-01-22 DIAGNOSIS — Z99.2 ESRD ON PERITONEAL DIALYSIS (HCC): ICD-10-CM

## 2019-01-22 DIAGNOSIS — R91.1 LUNG NODULE: ICD-10-CM

## 2019-01-22 DIAGNOSIS — N18.6 ESRD ON PERITONEAL DIALYSIS (HCC): ICD-10-CM

## 2019-01-22 DIAGNOSIS — J98.4 RESTRICTIVE LUNG DISEASE: ICD-10-CM

## 2019-01-22 DIAGNOSIS — E66.9 OBESITY (BMI 30-39.9): ICD-10-CM

## 2019-01-22 DIAGNOSIS — J44.9 CHRONIC OBSTRUCTIVE PULMONARY DISEASE, UNSPECIFIED COPD TYPE (HCC): ICD-10-CM

## 2019-01-22 DIAGNOSIS — G47.33 OSA ON CPAP: Chronic | ICD-10-CM

## 2019-01-22 DIAGNOSIS — J98.6 PARALYZED HEMIDIAPHRAGM: Primary | ICD-10-CM

## 2019-01-22 DIAGNOSIS — F17.200 SMOKER: ICD-10-CM

## 2019-01-22 PROCEDURE — G8926 SPIRO NO PERF OR DOC: HCPCS | Performed by: INTERNAL MEDICINE

## 2019-01-22 PROCEDURE — 99214 OFFICE O/P EST MOD 30 MIN: CPT | Performed by: INTERNAL MEDICINE

## 2019-01-22 PROCEDURE — G8427 DOCREV CUR MEDS BY ELIG CLIN: HCPCS | Performed by: INTERNAL MEDICINE

## 2019-01-22 PROCEDURE — G8417 CALC BMI ABV UP PARAM F/U: HCPCS | Performed by: INTERNAL MEDICINE

## 2019-01-22 PROCEDURE — 3023F SPIROM DOC REV: CPT | Performed by: INTERNAL MEDICINE

## 2019-01-22 PROCEDURE — 3017F COLORECTAL CA SCREEN DOC REV: CPT | Performed by: INTERNAL MEDICINE

## 2019-01-22 PROCEDURE — 1111F DSCHRG MED/CURRENT MED MERGE: CPT | Performed by: INTERNAL MEDICINE

## 2019-01-22 PROCEDURE — G8482 FLU IMMUNIZE ORDER/ADMIN: HCPCS | Performed by: INTERNAL MEDICINE

## 2019-01-22 PROCEDURE — 4004F PT TOBACCO SCREEN RCVD TLK: CPT | Performed by: INTERNAL MEDICINE

## 2019-01-22 PROCEDURE — G8598 ASA/ANTIPLAT THER USED: HCPCS | Performed by: INTERNAL MEDICINE

## 2019-01-22 ASSESSMENT — SLEEP AND FATIGUE QUESTIONNAIRES
HOW LIKELY ARE YOU TO NOD OFF OR FALL ASLEEP WHEN YOU ARE A PASSENGER IN A CAR FOR AN HOUR WITHOUT A BREAK: 0
HOW LIKELY ARE YOU TO NOD OFF OR FALL ASLEEP WHILE SITTING QUIETLY AFTER LUNCH WITHOUT ALCOHOL: 0
HOW LIKELY ARE YOU TO NOD OFF OR FALL ASLEEP IN A CAR, WHILE STOPPED FOR A FEW MINUTES IN TRAFFIC: 0
HOW LIKELY ARE YOU TO NOD OFF OR FALL ASLEEP WHILE SITTING AND READING: 1
NECK CIRCUMFERENCE (INCHES): 16.5
HOW LIKELY ARE YOU TO NOD OFF OR FALL ASLEEP WHILE SITTING AND TALKING TO SOMEONE: 0
ESS TOTAL SCORE: 6
HOW LIKELY ARE YOU TO NOD OFF OR FALL ASLEEP WHILE WATCHING TV: 1
HOW LIKELY ARE YOU TO NOD OFF OR FALL ASLEEP WHILE SITTING INACTIVE IN A PUBLIC PLACE: 1
HOW LIKELY ARE YOU TO NOD OFF OR FALL ASLEEP WHILE LYING DOWN TO REST IN THE AFTERNOON WHEN CIRCUMSTANCES PERMIT: 3

## 2019-01-22 ASSESSMENT — ENCOUNTER SYMPTOMS
RHINORRHEA: 1
ABDOMINAL DISTENTION: 1
SHORTNESS OF BREATH: 1

## 2019-02-06 ENCOUNTER — TELEPHONE (OUTPATIENT)
Dept: FAMILY MEDICINE CLINIC | Age: 49
End: 2019-02-06

## 2019-02-06 RX ORDER — BENZONATATE 100 MG/1
100-200 CAPSULE ORAL 3 TIMES DAILY PRN
Qty: 60 CAPSULE | Refills: 0 | Status: SHIPPED | OUTPATIENT
Start: 2019-02-06 | End: 2019-02-13

## 2019-02-06 RX ORDER — DOXYCYCLINE HYCLATE 100 MG
100 TABLET ORAL 2 TIMES DAILY
Qty: 20 TABLET | Refills: 0 | Status: SHIPPED | OUTPATIENT
Start: 2019-02-06 | End: 2019-02-16

## 2019-02-15 ENCOUNTER — TELEPHONE (OUTPATIENT)
Dept: PULMONOLOGY | Age: 49
End: 2019-02-15

## 2019-02-15 DIAGNOSIS — G47.33 OSA ON CPAP: Primary | Chronic | ICD-10-CM

## 2019-02-15 DIAGNOSIS — Z99.89 OSA ON CPAP: Primary | Chronic | ICD-10-CM

## 2019-02-19 DIAGNOSIS — G47.33 OBSTRUCTIVE SLEEP APNEA (ADULT) (PEDIATRIC): Primary | ICD-10-CM

## 2019-02-19 DIAGNOSIS — Z99.89 DEPENDENCE ON ENABLING MACHINE: ICD-10-CM

## 2019-02-20 ENCOUNTER — TELEPHONE (OUTPATIENT)
Dept: PULMONOLOGY | Age: 49
End: 2019-02-20

## 2019-02-26 ENCOUNTER — HOSPITAL ENCOUNTER (OUTPATIENT)
Dept: GENERAL RADIOLOGY | Age: 49
Discharge: HOME | End: 2019-02-26
Payer: COMMERCIAL

## 2019-02-26 DIAGNOSIS — Z99.89 OSA ON CPAP: Chronic | ICD-10-CM

## 2019-02-26 DIAGNOSIS — J98.6 PARALYZED HEMIDIAPHRAGM: ICD-10-CM

## 2019-02-26 DIAGNOSIS — G47.33 OSA ON CPAP: Chronic | ICD-10-CM

## 2019-02-26 PROCEDURE — 76000 FLUOROSCOPY <1 HR PHYS/QHP: CPT

## 2019-02-27 ENCOUNTER — OFFICE VISIT (OUTPATIENT)
Dept: PULMONOLOGY | Age: 49
End: 2019-02-27
Payer: COMMERCIAL

## 2019-02-27 VITALS
HEART RATE: 77 BPM | HEIGHT: 69 IN | RESPIRATION RATE: 16 BRPM | WEIGHT: 218 LBS | SYSTOLIC BLOOD PRESSURE: 116 MMHG | BODY MASS INDEX: 32.29 KG/M2 | DIASTOLIC BLOOD PRESSURE: 72 MMHG | TEMPERATURE: 98.1 F | OXYGEN SATURATION: 96 %

## 2019-02-27 DIAGNOSIS — N18.6 ESRD ON PERITONEAL DIALYSIS (HCC): ICD-10-CM

## 2019-02-27 DIAGNOSIS — Z99.2 ESRD ON PERITONEAL DIALYSIS (HCC): ICD-10-CM

## 2019-02-27 DIAGNOSIS — G47.33 OBSTRUCTIVE SLEEP APNEA (ADULT) (PEDIATRIC): ICD-10-CM

## 2019-02-27 DIAGNOSIS — J44.9 CHRONIC OBSTRUCTIVE PULMONARY DISEASE, UNSPECIFIED COPD TYPE (HCC): ICD-10-CM

## 2019-02-27 DIAGNOSIS — J98.4 RESTRICTIVE LUNG DISEASE: Primary | ICD-10-CM

## 2019-02-27 DIAGNOSIS — E66.9 OBESITY (BMI 30-39.9): ICD-10-CM

## 2019-02-27 DIAGNOSIS — G47.33 OSA (OBSTRUCTIVE SLEEP APNEA): ICD-10-CM

## 2019-02-27 PROCEDURE — G8926 SPIRO NO PERF OR DOC: HCPCS | Performed by: INTERNAL MEDICINE

## 2019-02-27 PROCEDURE — G8598 ASA/ANTIPLAT THER USED: HCPCS | Performed by: INTERNAL MEDICINE

## 2019-02-27 PROCEDURE — G8427 DOCREV CUR MEDS BY ELIG CLIN: HCPCS | Performed by: INTERNAL MEDICINE

## 2019-02-27 PROCEDURE — 3017F COLORECTAL CA SCREEN DOC REV: CPT | Performed by: INTERNAL MEDICINE

## 2019-02-27 PROCEDURE — 99213 OFFICE O/P EST LOW 20 MIN: CPT | Performed by: INTERNAL MEDICINE

## 2019-02-27 PROCEDURE — 4004F PT TOBACCO SCREEN RCVD TLK: CPT | Performed by: INTERNAL MEDICINE

## 2019-02-27 PROCEDURE — G8482 FLU IMMUNIZE ORDER/ADMIN: HCPCS | Performed by: INTERNAL MEDICINE

## 2019-02-27 PROCEDURE — G8417 CALC BMI ABV UP PARAM F/U: HCPCS | Performed by: INTERNAL MEDICINE

## 2019-02-27 PROCEDURE — 3023F SPIROM DOC REV: CPT | Performed by: INTERNAL MEDICINE

## 2019-02-27 RX ORDER — METAXALONE 800 MG/1
TABLET ORAL
Refills: 2 | COMMUNITY
Start: 2019-02-03 | End: 2019-05-30 | Stop reason: SDUPTHER

## 2019-02-27 ASSESSMENT — SLEEP AND FATIGUE QUESTIONNAIRES
HOW LIKELY ARE YOU TO NOD OFF OR FALL ASLEEP WHILE SITTING INACTIVE IN A PUBLIC PLACE: 0
HOW LIKELY ARE YOU TO NOD OFF OR FALL ASLEEP WHILE WATCHING TV: 2
HOW LIKELY ARE YOU TO NOD OFF OR FALL ASLEEP WHILE SITTING QUIETLY AFTER LUNCH WITHOUT ALCOHOL: 3
HOW LIKELY ARE YOU TO NOD OFF OR FALL ASLEEP WHEN YOU ARE A PASSENGER IN A CAR FOR AN HOUR WITHOUT A BREAK: 2
HOW LIKELY ARE YOU TO NOD OFF OR FALL ASLEEP WHILE SITTING AND READING: 0
NECK CIRCUMFERENCE (INCHES): 16.5
ESS TOTAL SCORE: 10
HOW LIKELY ARE YOU TO NOD OFF OR FALL ASLEEP WHILE LYING DOWN TO REST IN THE AFTERNOON WHEN CIRCUMSTANCES PERMIT: 3
HOW LIKELY ARE YOU TO NOD OFF OR FALL ASLEEP IN A CAR, WHILE STOPPED FOR A FEW MINUTES IN TRAFFIC: 0
HOW LIKELY ARE YOU TO NOD OFF OR FALL ASLEEP WHILE SITTING AND TALKING TO SOMEONE: 0

## 2019-02-27 ASSESSMENT — ENCOUNTER SYMPTOMS
RHINORRHEA: 1
SHORTNESS OF BREATH: 1
ABDOMINAL DISTENTION: 1

## 2019-03-13 ENCOUNTER — TELEPHONE (OUTPATIENT)
Dept: PULMONOLOGY | Age: 49
End: 2019-03-13

## 2019-03-22 ENCOUNTER — OFFICE VISIT (OUTPATIENT)
Dept: VASCULAR SURGERY | Age: 49
End: 2019-03-22
Payer: COMMERCIAL

## 2019-03-22 VITALS
HEIGHT: 69 IN | SYSTOLIC BLOOD PRESSURE: 148 MMHG | BODY MASS INDEX: 33.33 KG/M2 | DIASTOLIC BLOOD PRESSURE: 68 MMHG | WEIGHT: 225 LBS

## 2019-03-22 DIAGNOSIS — T82.898D STEAL SYNDROME AS COMPLICATION OF DIALYSIS ACCESS, SUBSEQUENT ENCOUNTER: Primary | ICD-10-CM

## 2019-03-22 PROCEDURE — 4004F PT TOBACCO SCREEN RCVD TLK: CPT | Performed by: SURGERY

## 2019-03-22 PROCEDURE — G8417 CALC BMI ABV UP PARAM F/U: HCPCS | Performed by: SURGERY

## 2019-03-22 PROCEDURE — 99213 OFFICE O/P EST LOW 20 MIN: CPT | Performed by: SURGERY

## 2019-03-22 PROCEDURE — G8427 DOCREV CUR MEDS BY ELIG CLIN: HCPCS | Performed by: SURGERY

## 2019-03-22 PROCEDURE — G8598 ASA/ANTIPLAT THER USED: HCPCS | Performed by: SURGERY

## 2019-03-22 PROCEDURE — G8482 FLU IMMUNIZE ORDER/ADMIN: HCPCS | Performed by: SURGERY

## 2019-03-22 PROCEDURE — 3017F COLORECTAL CA SCREEN DOC REV: CPT | Performed by: SURGERY

## 2019-03-26 ENCOUNTER — TELEPHONE (OUTPATIENT)
Dept: VASCULAR SURGERY | Age: 49
End: 2019-03-26

## 2019-03-26 ENCOUNTER — SURG/PROC ORDERS (OUTPATIENT)
Dept: VASCULAR SURGERY | Age: 49
End: 2019-03-26

## 2019-03-26 ENCOUNTER — ANESTHESIA EVENT (OUTPATIENT)
Dept: OPERATING ROOM | Age: 49
End: 2019-03-26
Payer: COMMERCIAL

## 2019-03-26 DIAGNOSIS — Z01.818 PRE-OP TESTING: Primary | ICD-10-CM

## 2019-03-26 ASSESSMENT — LIFESTYLE VARIABLES: SMOKING_STATUS: 1

## 2019-03-26 ASSESSMENT — COPD QUESTIONNAIRES: CAT_SEVERITY: MODERATE

## 2019-03-27 ENCOUNTER — HOSPITAL ENCOUNTER (OUTPATIENT)
Age: 49
Setting detail: OUTPATIENT SURGERY
Discharge: HOME | End: 2019-03-27
Attending: SURGERY | Admitting: SURGERY
Payer: COMMERCIAL

## 2019-03-27 ENCOUNTER — ANESTHESIA (OUTPATIENT)
Dept: OPERATING ROOM | Age: 49
End: 2019-03-27
Payer: COMMERCIAL

## 2019-03-27 VITALS
BODY MASS INDEX: 31.32 KG/M2 | HEART RATE: 72 BPM | DIASTOLIC BLOOD PRESSURE: 72 MMHG | SYSTOLIC BLOOD PRESSURE: 128 MMHG | WEIGHT: 211.5 LBS | HEIGHT: 69 IN | RESPIRATION RATE: 16 BRPM | TEMPERATURE: 97.5 F | OXYGEN SATURATION: 95 %

## 2019-03-27 VITALS
DIASTOLIC BLOOD PRESSURE: 71 MMHG | SYSTOLIC BLOOD PRESSURE: 123 MMHG | OXYGEN SATURATION: 100 % | RESPIRATION RATE: 12 BRPM

## 2019-03-27 DIAGNOSIS — F32.A DEPRESSION, UNSPECIFIED DEPRESSION TYPE: ICD-10-CM

## 2019-03-27 DIAGNOSIS — G62.9 NEUROPATHY: ICD-10-CM

## 2019-03-27 DIAGNOSIS — K59.04 CHRONIC IDIOPATHIC CONSTIPATION: ICD-10-CM

## 2019-03-27 LAB
ANION GAP SERPL CALCULATED.3IONS-SCNC: 13 MMOL/L (ref 3–16)
BUN BLDV-MCNC: 51 MG/DL (ref 7–20)
CALCIUM SERPL-MCNC: 9.3 MG/DL (ref 8.3–10.6)
CHLORIDE BLD-SCNC: 95 MMOL/L (ref 99–110)
CO2: 29 MMOL/L (ref 21–32)
CREAT SERPL-MCNC: 4.3 MG/DL (ref 0.9–1.3)
GFR AFRICAN AMERICAN: 18
GFR NON-AFRICAN AMERICAN: 15
GLUCOSE BLD-MCNC: 167 MG/DL (ref 70–99)
GLUCOSE BLD-MCNC: 193 MG/DL (ref 70–99)
PERFORMED ON: ABNORMAL
POTASSIUM REFLEX MAGNESIUM: 3.8 MMOL/L (ref 3.5–5.1)
SODIUM BLD-SCNC: 137 MMOL/L (ref 136–145)

## 2019-03-27 PROCEDURE — 3700000000 HC ANESTHESIA ATTENDED CARE: Performed by: SURGERY

## 2019-03-27 PROCEDURE — 6360000002 HC RX W HCPCS

## 2019-03-27 PROCEDURE — 2500000003 HC RX 250 WO HCPCS: Performed by: SURGERY

## 2019-03-27 PROCEDURE — 2580000003 HC RX 258: Performed by: NURSE ANESTHETIST, CERTIFIED REGISTERED

## 2019-03-27 PROCEDURE — 7100000011 HC PHASE II RECOVERY - ADDTL 15 MIN: Performed by: SURGERY

## 2019-03-27 PROCEDURE — 37607 LIG/BANDING ANGIOACS AV FSTL: CPT | Performed by: SURGERY

## 2019-03-27 PROCEDURE — 3600000006 HC SURGERY LEVEL 6 BASE: Performed by: SURGERY

## 2019-03-27 PROCEDURE — 7100000010 HC PHASE II RECOVERY - FIRST 15 MIN: Performed by: SURGERY

## 2019-03-27 PROCEDURE — 3600000016 HC SURGERY LEVEL 6 ADDTL 15MIN: Performed by: SURGERY

## 2019-03-27 PROCEDURE — 80048 BASIC METABOLIC PNL TOTAL CA: CPT

## 2019-03-27 PROCEDURE — 6360000002 HC RX W HCPCS: Performed by: ANESTHESIOLOGY

## 2019-03-27 PROCEDURE — 6360000002 HC RX W HCPCS: Performed by: SURGERY

## 2019-03-27 PROCEDURE — 6360000002 HC RX W HCPCS: Performed by: NURSE ANESTHETIST, CERTIFIED REGISTERED

## 2019-03-27 PROCEDURE — 3700000001 HC ADD 15 MINUTES (ANESTHESIA): Performed by: SURGERY

## 2019-03-27 PROCEDURE — 2709999900 HC NON-CHARGEABLE SUPPLY: Performed by: SURGERY

## 2019-03-27 RX ORDER — FENTANYL CITRATE 50 UG/ML
25 INJECTION, SOLUTION INTRAMUSCULAR; INTRAVENOUS EVERY 5 MIN PRN
Status: DISCONTINUED | OUTPATIENT
Start: 2019-03-27 | End: 2019-03-27 | Stop reason: HOSPADM

## 2019-03-27 RX ORDER — MIDAZOLAM HYDROCHLORIDE 1 MG/ML
2 INJECTION INTRAMUSCULAR; INTRAVENOUS ONCE
Status: COMPLETED | OUTPATIENT
Start: 2019-03-27 | End: 2019-03-27

## 2019-03-27 RX ORDER — SODIUM CHLORIDE 9 MG/ML
INJECTION, SOLUTION INTRAVENOUS CONTINUOUS PRN
Status: DISCONTINUED | OUTPATIENT
Start: 2019-03-27 | End: 2019-03-27 | Stop reason: SDUPTHER

## 2019-03-27 RX ORDER — MIDAZOLAM HYDROCHLORIDE 1 MG/ML
INJECTION INTRAMUSCULAR; INTRAVENOUS
Status: DISCONTINUED
Start: 2019-03-27 | End: 2019-03-27 | Stop reason: HOSPADM

## 2019-03-27 RX ORDER — METOCLOPRAMIDE HYDROCHLORIDE 5 MG/ML
10 INJECTION INTRAMUSCULAR; INTRAVENOUS ONCE
Status: COMPLETED | OUTPATIENT
Start: 2019-03-27 | End: 2019-03-27

## 2019-03-27 RX ORDER — OXYCODONE HYDROCHLORIDE AND ACETAMINOPHEN 5; 325 MG/1; MG/1
2 TABLET ORAL PRN
Status: DISCONTINUED | OUTPATIENT
Start: 2019-03-27 | End: 2019-03-27 | Stop reason: HOSPADM

## 2019-03-27 RX ORDER — ONDANSETRON 2 MG/ML
4 INJECTION INTRAMUSCULAR; INTRAVENOUS
Status: DISCONTINUED | OUTPATIENT
Start: 2019-03-27 | End: 2019-03-27 | Stop reason: HOSPADM

## 2019-03-27 RX ORDER — LIDOCAINE HYDROCHLORIDE 10 MG/ML
1 INJECTION, SOLUTION EPIDURAL; INFILTRATION; INTRACAUDAL; PERINEURAL
Status: DISCONTINUED | OUTPATIENT
Start: 2019-03-27 | End: 2019-03-27 | Stop reason: HOSPADM

## 2019-03-27 RX ORDER — APREPITANT 40 MG/1
40 CAPSULE ORAL ONCE
Status: COMPLETED | OUTPATIENT
Start: 2019-03-27 | End: 2019-03-27

## 2019-03-27 RX ORDER — SODIUM CHLORIDE 0.9 % (FLUSH) 0.9 %
10 SYRINGE (ML) INJECTION EVERY 12 HOURS SCHEDULED
Status: DISCONTINUED | OUTPATIENT
Start: 2019-03-27 | End: 2019-03-27 | Stop reason: HOSPADM

## 2019-03-27 RX ORDER — MEPERIDINE HYDROCHLORIDE 50 MG/ML
12.5 INJECTION INTRAMUSCULAR; INTRAVENOUS; SUBCUTANEOUS EVERY 5 MIN PRN
Status: DISCONTINUED | OUTPATIENT
Start: 2019-03-27 | End: 2019-03-27 | Stop reason: HOSPADM

## 2019-03-27 RX ORDER — SODIUM CHLORIDE 0.9 % (FLUSH) 0.9 %
10 SYRINGE (ML) INJECTION PRN
Status: DISCONTINUED | OUTPATIENT
Start: 2019-03-27 | End: 2019-03-27 | Stop reason: HOSPADM

## 2019-03-27 RX ORDER — HYDRALAZINE HYDROCHLORIDE 20 MG/ML
5 INJECTION INTRAMUSCULAR; INTRAVENOUS EVERY 10 MIN PRN
Status: DISCONTINUED | OUTPATIENT
Start: 2019-03-27 | End: 2019-03-27 | Stop reason: HOSPADM

## 2019-03-27 RX ORDER — MIDAZOLAM HYDROCHLORIDE 1 MG/ML
INJECTION INTRAMUSCULAR; INTRAVENOUS PRN
Status: DISCONTINUED | OUTPATIENT
Start: 2019-03-27 | End: 2019-03-27 | Stop reason: SDUPTHER

## 2019-03-27 RX ORDER — SODIUM CHLORIDE, SODIUM LACTATE, POTASSIUM CHLORIDE, CALCIUM CHLORIDE 600; 310; 30; 20 MG/100ML; MG/100ML; MG/100ML; MG/100ML
INJECTION, SOLUTION INTRAVENOUS CONTINUOUS
Status: DISCONTINUED | OUTPATIENT
Start: 2019-03-27 | End: 2019-03-27 | Stop reason: HOSPADM

## 2019-03-27 RX ORDER — LIDOCAINE HYDROCHLORIDE 10 MG/ML
INJECTION, SOLUTION EPIDURAL; INFILTRATION; INTRACAUDAL; PERINEURAL PRN
Status: DISCONTINUED | OUTPATIENT
Start: 2019-03-27 | End: 2019-03-27 | Stop reason: ALTCHOICE

## 2019-03-27 RX ORDER — FENTANYL CITRATE 50 UG/ML
INJECTION, SOLUTION INTRAMUSCULAR; INTRAVENOUS PRN
Status: DISCONTINUED | OUTPATIENT
Start: 2019-03-27 | End: 2019-03-27 | Stop reason: SDUPTHER

## 2019-03-27 RX ORDER — OXYCODONE HYDROCHLORIDE AND ACETAMINOPHEN 5; 325 MG/1; MG/1
1 TABLET ORAL PRN
Status: DISCONTINUED | OUTPATIENT
Start: 2019-03-27 | End: 2019-03-27 | Stop reason: HOSPADM

## 2019-03-27 RX ADMIN — HYDROMORPHONE HYDROCHLORIDE 1 MG: 1 INJECTION, SOLUTION INTRAMUSCULAR; INTRAVENOUS; SUBCUTANEOUS at 10:06

## 2019-03-27 RX ADMIN — MIDAZOLAM HYDROCHLORIDE 2 MG: 2 INJECTION, SOLUTION INTRAMUSCULAR; INTRAVENOUS at 14:23

## 2019-03-27 RX ADMIN — SODIUM CHLORIDE: 9 INJECTION, SOLUTION INTRAVENOUS at 14:16

## 2019-03-27 RX ADMIN — FENTANYL CITRATE 50 MCG: 50 INJECTION INTRAMUSCULAR; INTRAVENOUS at 14:29

## 2019-03-27 RX ADMIN — FENTANYL CITRATE 50 MCG: 50 INJECTION INTRAMUSCULAR; INTRAVENOUS at 14:23

## 2019-03-27 RX ADMIN — APREPITANT 40 MG: 40 CAPSULE ORAL at 09:08

## 2019-03-27 RX ADMIN — Medication 2 G: at 14:16

## 2019-03-27 RX ADMIN — METOCLOPRAMIDE 10 MG: 5 INJECTION, SOLUTION INTRAMUSCULAR; INTRAVENOUS at 09:52

## 2019-03-27 RX ADMIN — SODIUM CHLORIDE: 9 INJECTION, SOLUTION INTRAVENOUS at 14:27

## 2019-03-27 RX ADMIN — MIDAZOLAM 2 MG: 1 INJECTION INTRAMUSCULAR; INTRAVENOUS at 13:30

## 2019-03-27 RX ADMIN — HYDROMORPHONE HYDROCHLORIDE 0.5 MG: 1 INJECTION, SOLUTION INTRAMUSCULAR; INTRAVENOUS; SUBCUTANEOUS at 09:09

## 2019-03-27 ASSESSMENT — PULMONARY FUNCTION TESTS
PIF_VALUE: 42
PIF_VALUE: 0
PIF_VALUE: 1
PIF_VALUE: 40
PIF_VALUE: 0

## 2019-03-27 ASSESSMENT — PAIN - FUNCTIONAL ASSESSMENT: PAIN_FUNCTIONAL_ASSESSMENT: 0-10

## 2019-03-27 ASSESSMENT — PAIN SCALES - GENERAL
PAINLEVEL_OUTOF10: 9
PAINLEVEL_OUTOF10: 9

## 2019-03-28 RX ORDER — GABAPENTIN 100 MG/1
100 CAPSULE ORAL 3 TIMES DAILY
Qty: 270 CAPSULE | Refills: 0 | OUTPATIENT
Start: 2019-03-28 | End: 2019-06-26

## 2019-03-28 RX ORDER — PANTOPRAZOLE SODIUM 40 MG/1
40 TABLET, DELAYED RELEASE ORAL
Qty: 30 TABLET | Refills: 3 | Status: SHIPPED | OUTPATIENT
Start: 2019-03-28 | End: 2019-05-30 | Stop reason: SDUPTHER

## 2019-03-28 RX ORDER — TORSEMIDE 100 MG/1
TABLET ORAL
Qty: 90 TABLET | Refills: 1 | Status: ON HOLD | OUTPATIENT
Start: 2019-03-28 | End: 2019-10-23 | Stop reason: HOSPADM

## 2019-03-28 RX ORDER — DULOXETIN HYDROCHLORIDE 30 MG/1
30 CAPSULE, DELAYED RELEASE ORAL DAILY
Qty: 90 CAPSULE | Refills: 5 | Status: ON HOLD | OUTPATIENT
Start: 2019-03-28 | End: 2020-02-04 | Stop reason: HOSPADM

## 2019-03-28 RX ORDER — ISOSORBIDE MONONITRATE 30 MG/1
TABLET, EXTENDED RELEASE ORAL
Qty: 90 TABLET | Refills: 1 | Status: SHIPPED | OUTPATIENT
Start: 2019-03-28 | End: 2019-09-12 | Stop reason: SDUPTHER

## 2019-04-01 RX ORDER — ALBUTEROL SULFATE 2.5 MG/3ML
2.5 SOLUTION RESPIRATORY (INHALATION) EVERY 6 HOURS PRN
Qty: 120 EACH | Refills: 3 | Status: SHIPPED | OUTPATIENT
Start: 2019-04-01 | End: 2019-05-30 | Stop reason: SDUPTHER

## 2019-04-03 NOTE — PROGRESS NOTES
[address]   Cardiac Followup    Referring Provider:  Jason Car MD     Chief Complaint   Patient presents with    New Patient    Chest Pain     upper left side of chest and down the left arm         History of Present Illness:  Mr. Yung Lovelace is here for follow up for nonobstructive CAD, mild AS, HTN, and preoperative cardiac evaluation for kidney transplant. Today he states he has been having chest pain that started 3 months ago. This feels like a tightness on the left side of his chest and can feel like a spasm in his shoulder. His chest pain occurs at rest and when he is walking. It occurs 1-3 times a week. The pain last for 1 minute to 30 minutes and resolves. He also has bee having leg numbness. Patient currently denies any weight gain, edema, palpitations, shortness of breath, dizziness, and syncope. Past Medical History:   has a past medical history of Aortic stenosis, Arthritis, Asthma, Bilateral hilar adenopathy syndrome, CAD (coronary artery disease), CHF (congestive heart failure) ([address] COPD (chronic obstructive pulmonary disease) ([address] Depression, Diabetes mellitus ([address] Difficult intravenous access, Emphysema of lung ([address] ESRD on peritoneal dialysis ([address] Fear of needles, Gastric ulcer, GERD (gastroesophageal reflux disease), Heart valve problem, Hemodialysis patient ([address] Hx of blood clots, Hyperlipidemia, Hypertension, Neuromuscular disorder ([address] Numbness and tingling in left arm, Pneumonia, PONV (postoperative nausea and vomiting), Prolonged emergence from general anesthesia, Sleep apnea, Stroke ([address] TIA (transient ischemic attack), and Unspecified diseases of blood and blood-forming organs. Surgical History:   has a past surgical history that includes Tonsillectomy; cyst removal (8-14-13); Colonoscopy; Coronary angioplasty with stent (05/26/15); vascular surgery (aprx 2 years ago); Colonoscopy (2/29/2015); Upper gastrointestinal endoscopy (01/06/2016);  Upper gastrointestinal endoscopy (01/29/2017); other surgical history (02/01/2017); Dialysis fistula creation (Left, 10/30/2017); Diagnostic Cardiac Cath Lab Procedure; other surgical history (2018); other surgical history (Bilateral, 06/26/2018); pr lap insertion tunneled intraperitoneal catheter (N/A, 9/21/2018); other surgical history (Right, 09/2018); and Dialysis fistula creation (Left, 3/27/2019). Social History:   reports that he has been smoking cigarettes. He has a 16.50 pack-year smoking history. He has never used smokeless tobacco. He reports that he does not drink alcohol or use drugs. Family History:  family history includes Alcohol Abuse in his brother; Cancer in his sister and sister; Diabetes in his mother; Heart Disease in his father, sister, and sister; Kidney Disease in his sister; Obesity in his sister and sister. Home Medications:  Prior to Admission medications    Medication Sig Start Date End Date Taking?  Authorizing Provider   albuterol (PROVENTIL) (2.5 MG/3ML) 0.083% nebulizer solution Take 3 mLs by nebulization every 6 hours as needed for Wheezing 4/1/19  Yes Coretta Duff MD   pantoprazole (PROTONIX) 40 MG tablet Take 1 tablet by mouth every morning (before breakfast) 3/28/19  Yes José Chase, APRN - CNP   linaclotide (LINZESS) 145 MCG capsule Take 1 capsule by mouth every morning (before breakfast) 3/28/19  Yes José Chase, APRN - CNP   isosorbide mononitrate (IMDUR) 30 MG extended release tablet TAKE 1 TABLET EVERY DAY 3/28/19  Yes José Chase, APRN - CNP   DULoxetine (CYMBALTA) 30 MG extended release capsule Take 1 capsule by mouth daily 3/28/19  Yes José Chase, APRN - CNP   torsemide (DEMADEX) 100 MG tablet TAKE 1 TABLET EVERY DAY 3/28/19  Yes José Chase, APRN - CNP   metaxalone (SKELAXIN) 800 MG tablet TK ONE T PO Q 8 H PRN 2/3/19  Yes Historical Provider, MD   nitroGLYCERIN (NITROSTAT) 0.4 MG SL tablet Place 1 tablet under the tongue every 5 minutes as needed for Chest pain up to max of 3 total doses. If no relief after 1 dose, call 911. 12/10/18  Yes Margie Pineda MD   clopidogrel (PLAVIX) 75 MG tablet TAKE 1 TABLET EVERY DAY 11/26/18  Yes JAY Carrillo CNP   traZODone (DESYREL) 150 MG tablet TAKE 1 TABLET EVERY NIGHT 11/26/18  Yes JAY Carrillo CNP   CARTIA  MG extended release capsule TAKE 1 CAPSULE EVERY DAY 11/26/18  Yes JAY Carrillo CNP   pravastatin (PRAVACHOL) 80 MG tablet TAKE 1 TABLET EVERY DAY 11/26/18  Yes JAY Henry CNP   losartan (COZAAR) 50 MG tablet Take 1 tablet by mouth daily 11/14/18  Yes Shanell Pedroza MD   carvedilol (COREG) 12.5 MG tablet Take 1 tablet by mouth 2 times daily (with meals) 11/13/18  Yes Shanell Pedroza MD   citalopram (CELEXA) 40 MG tablet TAKE 1 TABLET EVERY DAY 10/8/18  Yes JAY Carrillo CNP   Insulin Syringe-Needle U-100 30G X 1/2\" 0.5 ML MISC 1 each by Does not apply route daily 9/25/18  Yes Blanka Aguero MD   oxyCODONE-acetaminophen (PERCOCET) 7.5-325 MG per tablet Take 1 tablet by mouth every 4 hours as needed for Pain. Lito Gray Historical Provider, MD   Pediatric Multivitamins-Fl (MULTI VIT/FL) 0.25 MG CHEW  5/2/18  Yes Historical Provider, MD   Polyethylene Glycol 3350 GRAN  5/2/18  Yes Historical Provider, MD   hydrOXYzine (VISTARIL) 50 MG capsule TAKE 1 TO 2 CAPSULES EVERY NIGHT 5/16/18  Yes Cirilo Foster MD   Glucose Blood (BLOOD GLUCOSE TEST STRIPS) STRP TEST 3-4 TIMES DAILY, AS DIRECTED 4/25/18  Yes Cirilo Foster MD   ACCU-CHEK FASTCLIX LANCETS MISC TEST 3-4 TIMES DAILY, AS DIRECTED 4/25/18  Yes Cirilo Foster MD   Blood Glucose Monitoring Suppl ADAM USE AS DIRECTED. 4/25/18  Yes Cirilo Foster MD   Alcohol Swabs PADS USE AS DIRECTED 4/25/18  Yes MD JEANNIE James Complex-C-Folic Acid (VIRT-CAPS) 1 MG CAPS TK ONE C PO  QD 1/15/18  Yes Historical Provider, MD   cyclobenzaprine (FLEXERIL) 10 MG tablet TK 1 T PO Q 8 H PRN 1/16/18  Yes Historical Provider, MD   albuterol sulfate  (90 Base) MCG/ACT inhaler Inhale 2 puffs into the lungs every 6 hours as needed for Wheezing 11/8/17  Yes Bebe Clarke MD   ipratropium-albuterol (DUONEB) 0.5-2.5 (3) MG/3ML SOLN nebulizer solution Inhale 3 mLs into the lungs every 6 hours as needed for Shortness of Breath 10/15/17  Yes Jean Pierre Gloria MD   glucose blood VI test strips (FREESTYLE LITE) strip Daily As needed. 5/25/17  Yes Myla Plata MD   glucose monitoring kit (FREESTYLE) monitoring kit 1 kit by Does not apply route daily 5/25/17  Yes Myla Plata MD   Lancets MISC Test daily 5/25/17  Yes Myla Plata MD   calcium carbonate (TUMS) 500 MG chewable tablet Take 1 tablet by mouth 3 times daily as needed for Heartburn. Yes Historical Provider, MD   aspirin 81 MG chewable tablet Take 1 tablet by mouth daily. 5/14/13  Yes Juju Silva MD   insulin glargine (LANTUS) 100 UNIT/ML injection vial Inject 10 Units into the skin nightly  Patient taking differently: Inject 10-15 Units into the skin nightly  9/25/18 1/7/19  Karen Samayoa MD        Allergies:  Morphine     Review of Systems:   · Constitutional: there has been no unanticipated weight loss. There's been no change in energy level, sleep pattern, or activity level. · Eyes: No visual changes or diplopia. No scleral icterus. · ENT: No Headaches, hearing loss or vertigo. No mouth sores or sore throat. · Cardiovascular: Reviewed in HPI  · Respiratory: No cough or wheezing, no sputum production. No hematemesis. · Gastrointestinal: No abdominal pain, appetite loss, blood in stools. No change in bowel or bladder habits. · Genitourinary: No dysuria, trouble voiding, or hematuria. · Musculoskeletal:  No gait disturbance, weakness or joint complaints. · Integumentary: No rash or pruritis. · Neurological: No headache, diplopia, change in muscle strength, numbness or tingling.  No change in gait, balance, coordination, mood, affect, memory, mentation, behavior. · Psychiatric: No anxiety, no depression. · Endocrine: No malaise, fatigue or temperature intolerance. No excessive thirst, fluid intake, or urination. No tremor. · Hematologic/Lymphatic: No abnormal bruising or bleeding, blood clots or swollen lymph nodes. · Allergic/Immunologic: No nasal congestion or hives. Physical Examination:    Vitals:    04/04/19 1359   BP: (!) 140/70   Pulse: 77   SpO2: 99%        Constitutional and General Appearance: NAD   Respiratory:  · Normal excursion and expansion without use of accessory muscles  · Resp Auscultation: Normal breath sounds without dullness  Cardiovascular:  · The apical impulses not displaced  · Heart tones are crisp and normal  · Cervical veins are not engorged  · The carotid upstroke is normal in amplitude and contour without delay. Right carotid bruit   · Normal S1S2, No S3, 2/6 murmur  · Peripheral pulses are symmetrical and full  · There is no clubbing, cyanosis of the extremities. · Trace edema  · Femoral Arteries: 2+ and equal  · Pedal Pulses: 2+ and equal   Abdomen:  · No masses or tenderness  · Liver/Spleen: No Abnormalities Noted  Neurological/Psychiatric:  · Alert and oriented in all spheres  · Moves all extremities well  · Exhibits normal gait balance and coordination  · No abnormalities of mood, affect, memory, mentation, or behavior are noted    cardiac catheterization May 2013, which showed mild nonobstructive disease. Cath 5/2015  LM <20%  LAD 70% mid. FFR 0.77  D1 50% prox  Cx 20%  RCA 20%  LVEDP 22mmHg  RA 9, RV 42/10, PA 44/16/31, W 18  PA sat 63%  PTCA LAD  3.0 x 15 PATRICIA  prox portion post 3.5 x 8 NC balloon    Myoview 6/2015  There is a very small and mild fixed posterior-basal defect consistent with   fibrosis. There is no evidence for ischemia. Left ventricular function is reduced with and estimated LVEF of 40%. The LV is severely dilated.      Cardiac cath 1/23/17  LM <20%  LAD 30% w/ patent stent  Cx 20-30%  RCA 20-30%  LVEDP 19  RA 8,  RV 41/10,  PA 45/14/31,  W 15  Nonobstructive CAD  Mild elevated right heart pressures    Stress Echocardiogram 9/17/18  Baseline nonspecific ST changes. PVC's during infusion. Patient developed bilateral jaw tightness 4/10, likely related to   Dobutamine. Symptoms resolved with rest.   No EKG changes of stress induced left ventricular ischemia. Dobutamine stress echo shows normal baseline EF at 55%. No regional wall   motion abnormality at rest or post dobutamine infusion. Normal hyperdynamic response to dobutamine stress. Definity contrast is   used. Normal dobutamine stress echocardiogram.       Assessment:   1. Coronary disease- nonobstructive by cath 1/23/17  2. Chronic systolic and diastolic congestive heart failure. He reportedly had an  ejection fraction of 40% in 2013 at the time of his cardiac catheterization. He has subsequently had at least 2 echocardiograms, most recently 3/2018, which showed an ejection fraction of 55% to 60%. 3. Mild aortic stenosis. 4. Left ventricular hypertrophy. 5. Diabetes. 6. Hypertension. 7. Chronic obstructive pulmonary disease. 8. Peripheral vascular disease. He is status post stents to his lower extremities. 9. Possible sleep apnea - has sleep study tonight  10. Preoperative cardiac evaluation for kidney transplant    11. Chest pain - possible angina. Normal stress test 9/2018 but symptoms new since then  12. Leg pain - possible claudication  Pre-op kidney txp - needs ischemic eval prior    Plan:  Stress test-Denise unable to walk on treadmill   Carotid dopplers   Arterial US lower extremities  Continue current medications   Check blood pressure at home weekly  Regular exercise and following a healthy diet encouraged   Follow up with NP in 6 months     Scribe's attestation:   This note was scribed in the presence of Dr. Jose Francisco Reaves M.D. By Primo Scott RN     The scribes docuementation has been prepared under my direction and personally reviewed by me in its entirety. I confirm that the note above accurately reflects all work, treatment, procedures, and medical decision making performed by me. Dr. Wu Fagan MD      Thank you for allowing me to participate in the care of this individual.      Eden Lewis M.D., [place]

## 2019-04-04 ENCOUNTER — OFFICE VISIT (OUTPATIENT)
Dept: CARDIOLOGY CLINIC | Age: 49
End: 2019-04-04
Payer: COMMERCIAL

## 2019-04-04 VITALS
OXYGEN SATURATION: 99 % | WEIGHT: 217.8 LBS | DIASTOLIC BLOOD PRESSURE: 70 MMHG | SYSTOLIC BLOOD PRESSURE: 140 MMHG | HEIGHT: 69 IN | BODY MASS INDEX: 32.26 KG/M2 | HEART RATE: 77 BPM

## 2019-04-04 DIAGNOSIS — Z01.810 PREOPERATIVE CARDIOVASCULAR EXAMINATION: ICD-10-CM

## 2019-04-04 DIAGNOSIS — R09.89 BILATERAL CAROTID BRUITS: ICD-10-CM

## 2019-04-04 DIAGNOSIS — I73.9 CLAUDICATION (HCC): ICD-10-CM

## 2019-04-04 DIAGNOSIS — I25.119 CORONARY ARTERY DISEASE INVOLVING NATIVE CORONARY ARTERY OF NATIVE HEART WITH ANGINA PECTORIS (HCC): Chronic | ICD-10-CM

## 2019-04-04 DIAGNOSIS — Z72.0 TOBACCO ABUSE: Chronic | ICD-10-CM

## 2019-04-04 DIAGNOSIS — E78.5 DYSLIPIDEMIA: Chronic | ICD-10-CM

## 2019-04-04 DIAGNOSIS — I10 ESSENTIAL HYPERTENSION: Chronic | ICD-10-CM

## 2019-04-04 DIAGNOSIS — Z01.818 PRE-OPERATIVE CLEARANCE: Primary | ICD-10-CM

## 2019-04-04 DIAGNOSIS — R07.2 PRECORDIAL PAIN: ICD-10-CM

## 2019-04-04 PROCEDURE — G8417 CALC BMI ABV UP PARAM F/U: HCPCS | Performed by: INTERNAL MEDICINE

## 2019-04-04 PROCEDURE — 3017F COLORECTAL CA SCREEN DOC REV: CPT | Performed by: INTERNAL MEDICINE

## 2019-04-04 PROCEDURE — 99214 OFFICE O/P EST MOD 30 MIN: CPT | Performed by: INTERNAL MEDICINE

## 2019-04-04 PROCEDURE — G8598 ASA/ANTIPLAT THER USED: HCPCS | Performed by: INTERNAL MEDICINE

## 2019-04-04 PROCEDURE — 93000 ELECTROCARDIOGRAM COMPLETE: CPT | Performed by: INTERNAL MEDICINE

## 2019-04-04 PROCEDURE — 4004F PT TOBACCO SCREEN RCVD TLK: CPT | Performed by: INTERNAL MEDICINE

## 2019-04-04 PROCEDURE — G8427 DOCREV CUR MEDS BY ELIG CLIN: HCPCS | Performed by: INTERNAL MEDICINE

## 2019-04-08 DIAGNOSIS — G47.33 OSA (OBSTRUCTIVE SLEEP APNEA): ICD-10-CM

## 2019-04-08 NOTE — TELEPHONE ENCOUNTER
[facility] is requesting a new prescription for clopidogrel (PLAVIX) 75 MG tablet  & pravastatin (PRAVACHOL) 80 MG tablet. Pls call to advise. Thank you.

## 2019-04-09 RX ORDER — CLOPIDOGREL BISULFATE 75 MG/1
TABLET ORAL
Qty: 90 TABLET | Refills: 1 | Status: SHIPPED | OUTPATIENT
Start: 2019-04-09 | End: 2019-09-12 | Stop reason: SDUPTHER

## 2019-04-09 RX ORDER — PRAVASTATIN SODIUM 80 MG/1
TABLET ORAL
Qty: 90 TABLET | Refills: 1 | Status: SHIPPED | OUTPATIENT
Start: 2019-04-09 | End: 2019-09-12 | Stop reason: SDUPTHER

## 2019-04-09 NOTE — TELEPHONE ENCOUNTER
Scripts pending.      4/4/19 Dr. Platt Ip:  Stress test-Denise unable to walk on treadmill   Carotid dopplers   Arterial US lower extremities  Continue current medications   Check blood pressure at home weekly  Regular exercise and following a healthy diet encouraged   Follow up with NP in 6 months

## 2019-04-10 ENCOUNTER — TELEPHONE (OUTPATIENT)
Dept: PULMONOLOGY | Age: 49
End: 2019-04-10

## 2019-04-10 NOTE — TELEPHONE ENCOUNTER
Reviewed home sleep study done on 4/5/19. There were 53 hypopneas, 5 apneas, giving an AHI of 11.6 per hour, RDI 24.9 per hour. I spoke to the patient, he is going to  his CPAP machine today. He should be scheduled for CPAP compliance follow-up.

## 2019-04-12 ENCOUNTER — APPOINTMENT (OUTPATIENT)
Dept: GENERAL RADIOLOGY | Age: 49
DRG: 189 | End: 2019-04-12
Payer: COMMERCIAL

## 2019-04-12 ENCOUNTER — APPOINTMENT (OUTPATIENT)
Dept: NUCLEAR MEDICINE | Age: 49
DRG: 189 | End: 2019-04-12
Payer: COMMERCIAL

## 2019-04-12 ENCOUNTER — HOSPITAL ENCOUNTER (INPATIENT)
Age: 49
LOS: 4 days | Discharge: HOME | DRG: 189 | End: 2019-04-16
Attending: EMERGENCY MEDICINE | Admitting: INTERNAL MEDICINE
Payer: COMMERCIAL

## 2019-04-12 DIAGNOSIS — D72.829 LEUKOCYTOSIS, UNSPECIFIED TYPE: ICD-10-CM

## 2019-04-12 DIAGNOSIS — Z86.79: ICD-10-CM

## 2019-04-12 DIAGNOSIS — Z87.09 HISTORY OF COPD: ICD-10-CM

## 2019-04-12 DIAGNOSIS — N18.6 CKD (CHRONIC KIDNEY DISEASE) REQUIRING CHRONIC DIALYSIS (HCC): ICD-10-CM

## 2019-04-12 DIAGNOSIS — R77.8 ELEVATED TROPONIN: ICD-10-CM

## 2019-04-12 DIAGNOSIS — R06.02 SHORTNESS OF BREATH: Primary | ICD-10-CM

## 2019-04-12 DIAGNOSIS — E83.39 HYPOPHOSPHATEMIA: ICD-10-CM

## 2019-04-12 DIAGNOSIS — R14.0 ABDOMINAL DISTENSION: ICD-10-CM

## 2019-04-12 DIAGNOSIS — Z99.2 CKD (CHRONIC KIDNEY DISEASE) REQUIRING CHRONIC DIALYSIS (HCC): ICD-10-CM

## 2019-04-12 DIAGNOSIS — Z99.2 PERITONEAL DIALYSIS CATHETER IN PLACE (HCC): ICD-10-CM

## 2019-04-12 LAB
A/G RATIO: 1.2 (ref 1.1–2.2)
ALBUMIN SERPL-MCNC: 3.8 G/DL (ref 3.4–5)
ALP BLD-CCNC: 81 U/L (ref 40–129)
ALT SERPL-CCNC: 14 U/L (ref 10–40)
ANION GAP SERPL CALCULATED.3IONS-SCNC: 16 MMOL/L (ref 3–16)
AST SERPL-CCNC: 13 U/L (ref 15–37)
ATYPICAL LYMPHOCYTE RELATIVE PERCENT: 3 % (ref 0–6)
BANDED NEUTROPHILS RELATIVE PERCENT: 4 % (ref 0–7)
BASOPHILS ABSOLUTE: 0 K/UL (ref 0–0.2)
BASOPHILS RELATIVE PERCENT: 0 %
BILIRUB SERPL-MCNC: <0.2 MG/DL (ref 0–1)
BUN BLDV-MCNC: 75 MG/DL (ref 7–20)
CALCIUM SERPL-MCNC: 9.6 MG/DL (ref 8.3–10.6)
CHLORIDE BLD-SCNC: 92 MMOL/L (ref 99–110)
CO2: 26 MMOL/L (ref 21–32)
CREAT SERPL-MCNC: 5.6 MG/DL (ref 0.9–1.3)
EKG ATRIAL RATE: 78 BPM
EKG DIAGNOSIS: NORMAL
EKG P AXIS: 73 DEGREES
EKG P-R INTERVAL: 164 MS
EKG Q-T INTERVAL: 444 MS
EKG QRS DURATION: 92 MS
EKG QTC CALCULATION (BAZETT): 506 MS
EKG R AXIS: 20 DEGREES
EKG T AXIS: 107 DEGREES
EKG VENTRICULAR RATE: 78 BPM
EOSINOPHILS ABSOLUTE: 0.6 K/UL (ref 0–0.6)
EOSINOPHILS RELATIVE PERCENT: 4 %
GFR AFRICAN AMERICAN: 13
GFR NON-AFRICAN AMERICAN: 11
GLOBULIN: 3.3 G/DL
GLUCOSE BLD-MCNC: 207 MG/DL (ref 70–99)
GLUCOSE BLD-MCNC: 232 MG/DL (ref 70–99)
GLUCOSE BLD-MCNC: 240 MG/DL (ref 70–99)
GLUCOSE BLD-MCNC: 243 MG/DL (ref 70–99)
GLUCOSE BLD-MCNC: 268 MG/DL (ref 70–99)
HCT VFR BLD CALC: 36.7 % (ref 40.5–52.5)
HEMOGLOBIN: 12.7 G/DL (ref 13.5–17.5)
INR BLD: 0.97 (ref 0.86–1.14)
LACTIC ACID, SEPSIS: 1.7 MMOL/L (ref 0.4–1.9)
LYMPHOCYTES ABSOLUTE: 2.4 K/UL (ref 1–5.1)
LYMPHOCYTES RELATIVE PERCENT: 14 %
MAGNESIUM: 1.8 MG/DL (ref 1.8–2.4)
MCH RBC QN AUTO: 32.1 PG (ref 26–34)
MCHC RBC AUTO-ENTMCNC: 34.8 G/DL (ref 31–36)
MCV RBC AUTO: 92.3 FL (ref 80–100)
MONOCYTES ABSOLUTE: 0.6 K/UL (ref 0–1.3)
MONOCYTES RELATIVE PERCENT: 4 %
NEUTROPHILS ABSOLUTE: 10.6 K/UL (ref 1.7–7.7)
NEUTROPHILS RELATIVE PERCENT: 71 %
PDW BLD-RTO: 14.9 % (ref 12.4–15.4)
PERFORMED ON: ABNORMAL
PHOSPHORUS: 2.1 MG/DL (ref 2.5–4.9)
PLATELET # BLD: 325 K/UL (ref 135–450)
PMV BLD AUTO: 7.6 FL (ref 5–10.5)
POTASSIUM REFLEX MAGNESIUM: 4.1 MMOL/L (ref 3.5–5.1)
PRO-BNP: 5624 PG/ML (ref 0–124)
PROTHROMBIN TIME: 11.1 SEC (ref 9.8–13)
RBC # BLD: 3.97 M/UL (ref 4.2–5.9)
RBC # BLD: NORMAL 10*6/UL
SODIUM BLD-SCNC: 134 MMOL/L (ref 136–145)
TOTAL PROTEIN: 7.1 G/DL (ref 6.4–8.2)
TROPONIN: 0.04 NG/ML
WBC # BLD: 14.1 K/UL (ref 4–11)

## 2019-04-12 PROCEDURE — 94640 AIRWAY INHALATION TREATMENT: CPT

## 2019-04-12 PROCEDURE — 2580000003 HC RX 258: Performed by: EMERGENCY MEDICINE

## 2019-04-12 PROCEDURE — 83605 ASSAY OF LACTIC ACID: CPT

## 2019-04-12 PROCEDURE — 83880 ASSAY OF NATRIURETIC PEPTIDE: CPT

## 2019-04-12 PROCEDURE — 6360000002 HC RX W HCPCS: Performed by: INTERNAL MEDICINE

## 2019-04-12 PROCEDURE — 2580000003 HC RX 258: Performed by: INTERNAL MEDICINE

## 2019-04-12 PROCEDURE — 6370000000 HC RX 637 (ALT 250 FOR IP): Performed by: INTERNAL MEDICINE

## 2019-04-12 PROCEDURE — 87205 SMEAR GRAM STAIN: CPT

## 2019-04-12 PROCEDURE — 93005 ELECTROCARDIOGRAM TRACING: CPT | Performed by: EMERGENCY MEDICINE

## 2019-04-12 PROCEDURE — 94660 CPAP INITIATION&MGMT: CPT

## 2019-04-12 PROCEDURE — 71046 X-RAY EXAM CHEST 2 VIEWS: CPT

## 2019-04-12 PROCEDURE — 6360000002 HC RX W HCPCS: Performed by: EMERGENCY MEDICINE

## 2019-04-12 PROCEDURE — 80053 COMPREHEN METABOLIC PANEL: CPT

## 2019-04-12 PROCEDURE — 83735 ASSAY OF MAGNESIUM: CPT

## 2019-04-12 PROCEDURE — A9540 TC99M MAA: HCPCS | Performed by: INTERNAL MEDICINE

## 2019-04-12 PROCEDURE — 84100 ASSAY OF PHOSPHORUS: CPT

## 2019-04-12 PROCEDURE — 87040 BLOOD CULTURE FOR BACTERIA: CPT

## 2019-04-12 PROCEDURE — 83036 HEMOGLOBIN GLYCOSYLATED A1C: CPT

## 2019-04-12 PROCEDURE — 78582 LUNG VENTILAT&PERFUS IMAGING: CPT

## 2019-04-12 PROCEDURE — 99285 EMERGENCY DEPT VISIT HI MDM: CPT

## 2019-04-12 PROCEDURE — 87070 CULTURE OTHR SPECIMN AEROBIC: CPT

## 2019-04-12 PROCEDURE — 2060000000 HC ICU INTERMEDIATE R&B

## 2019-04-12 PROCEDURE — 85025 COMPLETE CBC W/AUTO DIFF WBC: CPT

## 2019-04-12 PROCEDURE — A9558 XE133 XENON 10MCI: HCPCS | Performed by: INTERNAL MEDICINE

## 2019-04-12 PROCEDURE — 3430000000 HC RX DIAGNOSTIC RADIOPHARMACEUTICAL: Performed by: INTERNAL MEDICINE

## 2019-04-12 PROCEDURE — 96374 THER/PROPH/DIAG INJ IV PUSH: CPT

## 2019-04-12 PROCEDURE — 93010 ELECTROCARDIOGRAM REPORT: CPT | Performed by: INTERNAL MEDICINE

## 2019-04-12 PROCEDURE — 84484 ASSAY OF TROPONIN QUANT: CPT

## 2019-04-12 PROCEDURE — 94150 VITAL CAPACITY TEST: CPT

## 2019-04-12 PROCEDURE — 85610 PROTHROMBIN TIME: CPT

## 2019-04-12 PROCEDURE — 99223 1ST HOSP IP/OBS HIGH 75: CPT | Performed by: INTERNAL MEDICINE

## 2019-04-12 PROCEDURE — 2700000000 HC OXYGEN THERAPY PER DAY

## 2019-04-12 PROCEDURE — 36415 COLL VENOUS BLD VENIPUNCTURE: CPT

## 2019-04-12 PROCEDURE — 94761 N-INVAS EAR/PLS OXIMETRY MLT: CPT

## 2019-04-12 PROCEDURE — 96375 TX/PRO/DX INJ NEW DRUG ADDON: CPT

## 2019-04-12 RX ORDER — CLOPIDOGREL BISULFATE 75 MG/1
75 TABLET ORAL NIGHTLY
Status: DISCONTINUED | OUTPATIENT
Start: 2019-04-12 | End: 2019-04-12

## 2019-04-12 RX ORDER — DILTIAZEM HYDROCHLORIDE 180 MG/1
180 CAPSULE, COATED, EXTENDED RELEASE ORAL DAILY
Status: DISCONTINUED | OUTPATIENT
Start: 2019-04-12 | End: 2019-04-12

## 2019-04-12 RX ORDER — NICOTINE POLACRILEX 4 MG
15 LOZENGE BUCCAL PRN
Status: DISCONTINUED | OUTPATIENT
Start: 2019-04-12 | End: 2019-04-16 | Stop reason: HOSPADM

## 2019-04-12 RX ORDER — PANTOPRAZOLE SODIUM 40 MG/1
40 TABLET, DELAYED RELEASE ORAL
Status: DISCONTINUED | OUTPATIENT
Start: 2019-04-12 | End: 2019-04-16 | Stop reason: HOSPADM

## 2019-04-12 RX ORDER — PRAVASTATIN SODIUM 80 MG/1
80 TABLET ORAL DAILY
Status: DISCONTINUED | OUTPATIENT
Start: 2019-04-12 | End: 2019-04-12

## 2019-04-12 RX ORDER — CITALOPRAM 20 MG/1
40 TABLET ORAL NIGHTLY
Status: DISCONTINUED | OUTPATIENT
Start: 2019-04-12 | End: 2019-04-16 | Stop reason: HOSPADM

## 2019-04-12 RX ORDER — ISOSORBIDE MONONITRATE 30 MG/1
30 TABLET, EXTENDED RELEASE ORAL DAILY
Status: DISCONTINUED | OUTPATIENT
Start: 2019-04-12 | End: 2019-04-12

## 2019-04-12 RX ORDER — SODIUM CHLORIDE 0.9 % (FLUSH) 0.9 %
10 SYRINGE (ML) INJECTION EVERY 12 HOURS SCHEDULED
Status: DISCONTINUED | OUTPATIENT
Start: 2019-04-12 | End: 2019-04-16 | Stop reason: HOSPADM

## 2019-04-12 RX ORDER — LOSARTAN POTASSIUM 25 MG/1
50 TABLET ORAL NIGHTLY
Status: DISCONTINUED | OUTPATIENT
Start: 2019-04-12 | End: 2019-04-16 | Stop reason: HOSPADM

## 2019-04-12 RX ORDER — DILTIAZEM HYDROCHLORIDE 180 MG/1
180 CAPSULE, COATED, EXTENDED RELEASE ORAL NIGHTLY
Status: DISCONTINUED | OUTPATIENT
Start: 2019-04-12 | End: 2019-04-16 | Stop reason: HOSPADM

## 2019-04-12 RX ORDER — ALBUTEROL SULFATE 90 UG/1
2 AEROSOL, METERED RESPIRATORY (INHALATION) 4 TIMES DAILY
Status: DISCONTINUED | OUTPATIENT
Start: 2019-04-12 | End: 2019-04-16 | Stop reason: HOSPADM

## 2019-04-12 RX ORDER — HEPARIN SODIUM 5000 [USP'U]/ML
5000 INJECTION, SOLUTION INTRAVENOUS; SUBCUTANEOUS EVERY 8 HOURS SCHEDULED
Status: DISCONTINUED | OUTPATIENT
Start: 2019-04-12 | End: 2019-04-16 | Stop reason: HOSPADM

## 2019-04-12 RX ORDER — IPRATROPIUM BROMIDE AND ALBUTEROL SULFATE 2.5; .5 MG/3ML; MG/3ML
1 SOLUTION RESPIRATORY (INHALATION) EVERY 6 HOURS PRN
Status: DISCONTINUED | OUTPATIENT
Start: 2019-04-12 | End: 2019-04-16 | Stop reason: HOSPADM

## 2019-04-12 RX ORDER — LOSARTAN POTASSIUM 25 MG/1
50 TABLET ORAL DAILY
Status: DISCONTINUED | OUTPATIENT
Start: 2019-04-12 | End: 2019-04-12

## 2019-04-12 RX ORDER — ONDANSETRON 2 MG/ML
4 INJECTION INTRAMUSCULAR; INTRAVENOUS ONCE
Status: COMPLETED | OUTPATIENT
Start: 2019-04-12 | End: 2019-04-12

## 2019-04-12 RX ORDER — CARVEDILOL 6.25 MG/1
12.5 TABLET ORAL 2 TIMES DAILY WITH MEALS
Status: DISCONTINUED | OUTPATIENT
Start: 2019-04-12 | End: 2019-04-16 | Stop reason: HOSPADM

## 2019-04-12 RX ORDER — DEXTROSE MONOHYDRATE 25 G/50ML
12.5 INJECTION, SOLUTION INTRAVENOUS PRN
Status: DISCONTINUED | OUTPATIENT
Start: 2019-04-12 | End: 2019-04-16 | Stop reason: HOSPADM

## 2019-04-12 RX ORDER — ASPIRIN 81 MG/1
81 TABLET, CHEWABLE ORAL DAILY
Status: DISCONTINUED | OUTPATIENT
Start: 2019-04-12 | End: 2019-04-12

## 2019-04-12 RX ORDER — OXYCODONE AND ACETAMINOPHEN 7.5; 325 MG/1; MG/1
1 TABLET ORAL EVERY 6 HOURS PRN
Status: DISCONTINUED | OUTPATIENT
Start: 2019-04-12 | End: 2019-04-16 | Stop reason: HOSPADM

## 2019-04-12 RX ORDER — DEXTROSE MONOHYDRATE 50 MG/ML
100 INJECTION, SOLUTION INTRAVENOUS PRN
Status: DISCONTINUED | OUTPATIENT
Start: 2019-04-12 | End: 2019-04-16 | Stop reason: HOSPADM

## 2019-04-12 RX ORDER — CLOPIDOGREL BISULFATE 75 MG/1
75 TABLET ORAL DAILY
Status: DISCONTINUED | OUTPATIENT
Start: 2019-04-12 | End: 2019-04-12

## 2019-04-12 RX ORDER — ASPIRIN 81 MG/1
81 TABLET, CHEWABLE ORAL NIGHTLY
Status: DISCONTINUED | OUTPATIENT
Start: 2019-04-12 | End: 2019-04-12

## 2019-04-12 RX ORDER — PRAVASTATIN SODIUM 80 MG/1
80 TABLET ORAL NIGHTLY
Status: DISCONTINUED | OUTPATIENT
Start: 2019-04-12 | End: 2019-04-16 | Stop reason: HOSPADM

## 2019-04-12 RX ORDER — SODIUM CHLORIDE 0.9 % (FLUSH) 0.9 %
10 SYRINGE (ML) INJECTION PRN
Status: DISCONTINUED | OUTPATIENT
Start: 2019-04-12 | End: 2019-04-16 | Stop reason: HOSPADM

## 2019-04-12 RX ORDER — FENTANYL CITRATE 50 UG/ML
50 INJECTION, SOLUTION INTRAMUSCULAR; INTRAVENOUS ONCE
Status: COMPLETED | OUTPATIENT
Start: 2019-04-12 | End: 2019-04-12

## 2019-04-12 RX ORDER — TORSEMIDE 100 MG/1
100 TABLET ORAL DAILY
Status: DISCONTINUED | OUTPATIENT
Start: 2019-04-12 | End: 2019-04-16 | Stop reason: HOSPADM

## 2019-04-12 RX ORDER — ISOSORBIDE MONONITRATE 30 MG/1
30 TABLET, EXTENDED RELEASE ORAL NIGHTLY
Status: DISCONTINUED | OUTPATIENT
Start: 2019-04-12 | End: 2019-04-16 | Stop reason: HOSPADM

## 2019-04-12 RX ORDER — ALBUTEROL SULFATE 2.5 MG/3ML
2.5 SOLUTION RESPIRATORY (INHALATION) EVERY 6 HOURS PRN
Status: DISCONTINUED | OUTPATIENT
Start: 2019-04-12 | End: 2019-04-16 | Stop reason: HOSPADM

## 2019-04-12 RX ORDER — INSULIN GLARGINE 100 [IU]/ML
10 INJECTION, SOLUTION SUBCUTANEOUS NIGHTLY
Status: DISCONTINUED | OUTPATIENT
Start: 2019-04-12 | End: 2019-04-16 | Stop reason: HOSPADM

## 2019-04-12 RX ORDER — CYCLOBENZAPRINE HCL 10 MG
10 TABLET ORAL 3 TIMES DAILY PRN
Status: DISCONTINUED | OUTPATIENT
Start: 2019-04-12 | End: 2019-04-16 | Stop reason: HOSPADM

## 2019-04-12 RX ORDER — CITALOPRAM 20 MG/1
40 TABLET ORAL DAILY
Status: DISCONTINUED | OUTPATIENT
Start: 2019-04-12 | End: 2019-04-12

## 2019-04-12 RX ORDER — ONDANSETRON 2 MG/ML
4 INJECTION INTRAMUSCULAR; INTRAVENOUS EVERY 6 HOURS PRN
Status: DISCONTINUED | OUTPATIENT
Start: 2019-04-12 | End: 2019-04-12

## 2019-04-12 RX ADMIN — ISOSORBIDE MONONITRATE 30 MG: 30 TABLET, EXTENDED RELEASE ORAL at 20:12

## 2019-04-12 RX ADMIN — Medication 10 ML: at 20:12

## 2019-04-12 RX ADMIN — LOSARTAN POTASSIUM 50 MG: 25 TABLET, FILM COATED ORAL at 20:12

## 2019-04-12 RX ADMIN — TORSEMIDE 100 MG: 100 TABLET ORAL at 11:01

## 2019-04-12 RX ADMIN — CEFTRIAXONE SODIUM 1 G: 1 INJECTION, POWDER, FOR SOLUTION INTRAMUSCULAR; INTRAVENOUS at 07:58

## 2019-04-12 RX ADMIN — Medication 2 PUFF: at 11:38

## 2019-04-12 RX ADMIN — CARVEDILOL 12.5 MG: 6.25 TABLET, FILM COATED ORAL at 11:01

## 2019-04-12 RX ADMIN — Medication 28 MILLICURIE: at 13:09

## 2019-04-12 RX ADMIN — INSULIN GLARGINE 10 UNITS: 100 INJECTION, SOLUTION SUBCUTANEOUS at 20:13

## 2019-04-12 RX ADMIN — OXYCODONE AND ACETAMINOPHEN 1 TABLET: 7.5; 325 TABLET ORAL at 19:39

## 2019-04-12 RX ADMIN — PANTOPRAZOLE SODIUM 40 MG: 40 TABLET, DELAYED RELEASE ORAL at 11:29

## 2019-04-12 RX ADMIN — INSULIN LISPRO 4 UNITS: 100 INJECTION, SOLUTION INTRAVENOUS; SUBCUTANEOUS at 11:34

## 2019-04-12 RX ADMIN — INSULIN LISPRO 2 UNITS: 100 INJECTION, SOLUTION INTRAVENOUS; SUBCUTANEOUS at 20:13

## 2019-04-12 RX ADMIN — OXYCODONE AND ACETAMINOPHEN 1 TABLET: 7.5; 325 TABLET ORAL at 11:29

## 2019-04-12 RX ADMIN — FENTANYL CITRATE 100 MCG: 50 INJECTION, SOLUTION INTRAMUSCULAR; INTRAVENOUS at 05:37

## 2019-04-12 RX ADMIN — CYCLOBENZAPRINE HYDROCHLORIDE 10 MG: 10 TABLET, FILM COATED ORAL at 11:30

## 2019-04-12 RX ADMIN — DILTIAZEM HYDROCHLORIDE 180 MG: 180 CAPSULE, COATED, EXTENDED RELEASE ORAL at 20:12

## 2019-04-12 RX ADMIN — CITALOPRAM HYDROBROMIDE 40 MG: 20 TABLET ORAL at 20:12

## 2019-04-12 RX ADMIN — ONDANSETRON 4 MG: 2 INJECTION INTRAMUSCULAR; INTRAVENOUS at 05:37

## 2019-04-12 RX ADMIN — PRAVASTATIN SODIUM 80 MG: 80 TABLET ORAL at 20:12

## 2019-04-12 RX ADMIN — HEPARIN SODIUM 5000 UNITS: 5000 INJECTION INTRAVENOUS; SUBCUTANEOUS at 20:13

## 2019-04-12 RX ADMIN — INSULIN LISPRO 4 UNITS: 100 INJECTION, SOLUTION INTRAVENOUS; SUBCUTANEOUS at 18:13

## 2019-04-12 RX ADMIN — CARVEDILOL 12.5 MG: 6.25 TABLET, FILM COATED ORAL at 20:28

## 2019-04-12 RX ADMIN — Medication 6 MILLICURIE: at 13:16

## 2019-04-12 RX ADMIN — HEPARIN SODIUM 5000 UNITS: 5000 INJECTION INTRAVENOUS; SUBCUTANEOUS at 15:40

## 2019-04-12 ASSESSMENT — PAIN DESCRIPTION - PAIN TYPE
TYPE: CHRONIC PAIN
TYPE: CHRONIC PAIN
TYPE: ACUTE PAIN;CHRONIC PAIN

## 2019-04-12 ASSESSMENT — ENCOUNTER SYMPTOMS
VOICE CHANGE: 0
CONSTIPATION: 0
DIARRHEA: 0
ABDOMINAL PAIN: 0
EYE ITCHING: 0
SORE THROAT: 0
CHOKING: 0
COUGH: 0
SHORTNESS OF BREATH: 1
EYE DISCHARGE: 0
EYE PAIN: 0

## 2019-04-12 ASSESSMENT — PAIN SCALES - GENERAL
PAINLEVEL_OUTOF10: 5
PAINLEVEL_OUTOF10: 5
PAINLEVEL_OUTOF10: 7
PAINLEVEL_OUTOF10: 8
PAINLEVEL_OUTOF10: 9
PAINLEVEL_OUTOF10: 10
PAINLEVEL_OUTOF10: 9
PAINLEVEL_OUTOF10: 9

## 2019-04-12 ASSESSMENT — PAIN DESCRIPTION - ORIENTATION: ORIENTATION: LOWER

## 2019-04-12 ASSESSMENT — PAIN DESCRIPTION - LOCATION
LOCATION: BACK

## 2019-04-12 NOTE — PROGRESS NOTES
04/12/19 0609   NIV Type   $NIV $Daily Charge   NIV Started Yes   Equipment Type v60   Mode BIPAP   Mask Type Full face mask   Mask Size Medium   Settings/Measurements   Comfort Level Good   Using Accessory Muscles No   IPAP 12 cmH20   EPAP 6 cmH2O   Rate Ordered 14   Resp 15   SpO2 100   FiO2  40 %   Vt Exhaled 862 mL   Mask Leak (lpm) 62 lpm  (facial hair)   Skin Protection for O2 Device Yes   Breath Sounds   Right Upper Lobe Diminished   Right Middle Lobe Diminished   Right Lower Lobe Diminished   Left Upper Lobe Diminished   Left Lower Lobe Diminished   Patient Observation   Observations mepilex on nose   Alarm Settings   Alarms On Y   Press Low Alarm 6 cmH2O   High Pressure Alarm 25 cmH2O   Apnea (secs) 20 secs   Resp Rate Low Alarm 6   High Respiratory Rate 40 br/min

## 2019-04-12 NOTE — ED NOTES
Pt transported on 3 liters of oxygen and nurse pushing bipap machine to floor.      Irma Morales RN  04/12/19 0614

## 2019-04-12 NOTE — ED NOTES
Pt is refusing abg at this time and floor nurse at bedside to get report on pt.       Heriberto Covarrubias RN  04/12/19 9168

## 2019-04-12 NOTE — PROGRESS NOTES
Perfect served Dr. Shell Rangel @ [phone] 4/12/19 re: pulmonary consult. -[phone] [facility]

## 2019-04-12 NOTE — PROGRESS NOTES
[facility] or Facility: [facility] From: [facility], [facility] RE: henok lala 1970 RM: [phone] pt requesting pain meds. states home 7.5 Percocet Q 4 and flexural Q 8 hours. prn. please review and advise.  ty Need Callback: NO CALLBACK REQ C4 PROGRESSIVE CARE
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
## 2019-04-12 NOTE — PROGRESS NOTES
RESPIRATORY THERAPY ASSESSMENT    Name:  Juan Ramon Brown Record Number:  [id number]  Age: 44 y.o. Gender: male  : 1970  Today's Date:  2019  Room:  [facility]042-    Assessment     Is the patient being admitted for a COPD or Asthma exacerbation? No   (If yes the patient will be seen every 4 hours for the first 24 hours and then reassessed)    Patient Admission Diagnosis      Allergies  Allergies   Allergen Reactions    Morphine Nausea And Vomiting       Minimum Predicted Vital Capacity:     1060          Actual Vital Capacity:      4500              Pulmonary History:COPD and CHF/Pulmonary Edema  Home Oxygen Therapy:  room air  Home Respiratory Therapy:Albuterol and Albuterol/Ipratropium Bromide HHN   Current Respiratory Therapy:  Atrovent Albuterol mdi QID  Treatment Type: MDI, IS  Medications: Ipratropium Bromide    Respiratory Severity Index(RSI)   Patients with orders for inhalation medications, oxygen, or any therapeutic treatment modality will be placed on Respiratory Protocol. They will be assessed with the first treatment and at least every 72 hours thereafter. The following severity scale will be used to determine frequency of treatment intervention.     Smoking History: Pulmonary Disease or Smoking History, Greater than 15 pack year = 2    Social History  Social History     Tobacco Use    Smoking status: Current Every Day Smoker     Packs/day: 0.50     Years: 33.00     Pack years: 16.50     Types: Cigarettes    Smokeless tobacco: Never Used    Tobacco comment: TRYING TO QUIT 3-7 a day   Substance Use Topics    Alcohol use: No     Alcohol/week: 0.0 oz    Drug use: No       Recent Surgical History: None = 0  Past Surgical History  Past Surgical History:   Procedure Laterality Date    COLONOSCOPY      COLONOSCOPY      WNL    CORONARY ANGIOPLASTY WITH STENT PLACEMENT  05/26/15    CYST REMOVAL  13    EXCISION CYSTS, NECK X2 AND ABDOMINAL     DIAGNOSTIC CARDIAC CATH LAB PROCEDURE      DIALYSIS FISTULA CREATION Left 10/30/2017    LEFT BRACHIAL CEPHALIC FISTULA    DIALYSIS FISTULA CREATION Left 3/27/2019    LIGATION  AV FISTULA performed by Bogdan Fisher MD at [place] [address]  02/01/2017    laparoscopic cholecystectomy with intraoperative cholangiogram    OTHER SURGICAL HISTORY  2018    PORT PLACEMENT  - vas cath    OTHER SURGICAL HISTORY Bilateral 06/26/2018    laprascopic peritoneal dialysis catheter placement    OTHER SURGICAL HISTORY Right 09/2018    peritoneal dialysis port placed on right side of abdomen    LA LAP INSERTION TUNNELED INTRAPERITONEAL CATHETER N/A 9/21/2018    LAPAROSCOPIC PERITONEAL DIALYSIS CATHETER REPLACEMENT performed by Xiomara Malone MD at [address] [place] [address]  01/06/2016    UPPER GASTROINTESTINAL ENDOSCOPY  01/29/2017    possible candida, otherwise normal appearing    VASCULAR SURGERY  aprx 2 years ago    2 stents placed, each side of groin       Level of Consciousness: Alert, Oriented, and Cooperative = 0    Level of Activity: Mostly sedentary, minimal walking = 2    Respiratory Pattern: Regular Pattern; RR 8-20 = 0    Breath Sounds: Diminshed bilaterally and/or crackles = 2    Sputum   ,  , Sputum How Obtained: None  Cough: Strong, spontaneous, non-productive = 0    Vital Signs   /63   Pulse 66   Temp 97.4 °F (36.3 °C)   Resp 16   Ht 5' 9\" (1.753 m)   Wt 210 lb (95.3 kg)   SpO2 98%   BMI 31.01 kg/m²   SPO2 (COPD values may differ): 88-89% on room air or greater than 92% on FiO2 28- 35% = 2    Peak Flow (asthma only): not applicable = 0    RSI: 7-8 = BID and Q4HPRN (every four hours as needed) for dyspnea        Plan       Goals: medication delivery and improve oxygenation    Patient/caregiver was educated on the proper method of use for Respiratory Care Devices:  Yes      Level of patient/caregiver understanding able to:   ? Verbalize understanding   ? Demonstrate understanding       ? Teach back        ? Needs reinforcement       ? No available caregiver               ? Other:     Response to education:  Good     Is patient being placed on Home Treatment Regimen? Yes     Does the patient have everything they need prior to discharge? NA     Comments: chart reviewed and pt assessed    Plan of Care: home regimen    Electronically signed by Zandra Chatman RCP on 4/12/2019 at 5:32 PM    Respiratory Protocol Guidelines     1. Assessment and treatment by Respiratory Therapy will be initiated for medication and therapeutic interventions upon initiation of aerosolized medication. 2. Physician will be contacted for respiratory rate (RR) greater than 35 breaths per minute. Therapy will be held for heart rate (HR) greater than 140 beats per minute, pending direction from physician. 3. Bronchodilators will be administered via Metered Dose Inhaler (MDI) with spacer when the following criteria are met:  a. Alert and cooperative     b. HR < 140 bpm  c. RR < 30 bpm                d. Can demonstrate a 2-3 second inspiratory hold  4. Bronchodilators will be administered via Hand Held Nebulizer [facility]) to patients when ANY of the following criteria are met  a. Incognizant or uncooperative          b. Patients treated with HHN at Home        c. Unable to demonstrate proper use of MDI with spacer     d. RR > 30 bpm   5. Bronchodilators will be delivered via Metered Dose Inhaler (MDI), HHN, Aerogen to intubated patients on mechanical ventilation. 6. Inhalation medication orders will be delivered and/or substituted as outlined below. Aerosolized Medications Ordering and Administration Guidelines:    1. All Medications will be ordered by a physician, and their frequency and/or modality will be adjusted as defined by the patients Respiratory Severity Index (RSI) score.   2. If the patient does not have documented COPD, consider discontinuing anticholinergics when RSI is less than 9.  3. If the bronchospasm worsens (increased RSI), then the bronchodilator frequency can be increased to a maximum of every 4 hours. If greater than every 4 hours is required, the physician will be contacted. 4. If the bronchospasm improves, the frequency of the bronchodilator can be decreased, based on the patient's RSI, but not less than home treatment regimen frequency. 5. Bronchodilator(s) will be discontinued if patient has a RSI less than 9 and has received no scheduled or as needed treatment for 72  Hrs. Patients Ordered on a Mucolytic Agent:    1. Must always be administered with a bronchodilator. 2. Discontinue if patient experiences worsened bronchospasm, or secretions have lessened to the point that the patient is able to clear them with a cough. Anti-inflammatory and Combination Medications:    1. If the patient lacks prior history of lung disease, is not using inhaled anti-inflammatory medication at home, and lacks wheezing by examination or by history for at least 24 hours, contact physician for possible discontinuation.

## 2019-04-12 NOTE — PROGRESS NOTES
[facility] or Facility: [facility] From: [facility], [facility] RE: henok reeves 1970 RM: [phone] pt needs his home pain medications please. pt has chronic pain. takes Percocet 7.5 every 4 hours and flexiril every 8 hours. pt has been asking since admission. please review and advise.  ty Need Callback: NO CALLBACK REQ C4 PROGRESSIVE CARE

## 2019-04-12 NOTE — PLAN OF CARE
Problem: Pain:  Goal: Pain level will decrease  Description  Pain level will decrease  Outcome: Ongoing  Goal: Control of acute pain  Description  Control of acute pain  Outcome: Ongoing  Goal: Control of chronic pain  Description  Control of chronic pain  Outcome: Ongoing     Problem: Falls - Risk of:  Goal: Will remain free from falls  Description  Will remain free from falls  Outcome: Ongoing  Goal: Absence of physical injury  Description  Absence of physical injury  Outcome: Ongoing     Problem: OXYGENATION/RESPIRATORY FUNCTION  Goal: Patient will maintain patent airway  Outcome: Ongoing  Note:   Patient's EF (Ejection Fraction) is greater than 40%    Patient has a past medical history of Aortic stenosis, Arthritis, Asthma, Bilateral hilar adenopathy syndrome, CAD (coronary artery disease), CHF (congestive heart failure) ([facility] COPD (chronic obstructive pulmonary disease) ([facility] [address] [address] Depression, Diabetes mellitus ([address] Difficult intravenous access, Emphysema of lung ([address] ESRD on peritoneal dialysis ([facility] [address] Fear of needles, Gastric ulcer, GERD (gastroesophageal reflux disease), Heart valve problem, Hemodialysis patient ([address] Hx of blood clots, Hyperlipidemia, Hypertension, Neuromuscular disorder ([address] Numbness and tingling in left arm, Pneumonia, PONV (postoperative nausea and vomiting), Prolonged emergence from general anesthesia, Sleep apnea, Stroke ([address] TIA (transient ischemic attack), and Unspecified diseases of blood and blood-forming organs. Comorbidities reviewed and education provided. Patient and family's stated goal of care: reduce shortness of breath, increase activity tolerance, better understand heart failure and disease management, be more comfortable and reduce lower extremity edema prior to discharge    Patient's current functional capacity:  Slight limitation of physical activity. Comfortable at rest. Ordinary physical activity results in fatigue, palpitation, dyspnea.     Pt resting in bed at this time on 2 L O2. Pt denies shortness of breath. Pt without lower extremity edema.  Patient's weights and intake/output reviewed:    Patient Vitals for the past 96 hrs (Last 3 readings):   Weight   04/12/19 0402 210 lb (95.3 kg)       Intake/Output Summary (Last 24 hours) at 4/12/2019 1340  Last data filed at 4/12/2019 0934  Gross per 24 hour   Intake 120 ml   Output --   Net 120 ml         >>For CHF and Comorbidity documentation on Education Time and Topics, please see Education Tab     Goal: Patient will achieve/maintain normal respiratory rate/effort  Description  Respiratory rate and effort will be within normal limits for the patient  Outcome: Ongoing     Problem: HEMODYNAMIC STATUS  Goal: Patient has stable vital signs and fluid balance  Outcome: Ongoing     Problem: FLUID AND ELECTROLYTE IMBALANCE  Goal: Fluid and electrolyte balance are achieved/maintained  Outcome: Ongoing     Problem: ACTIVITY INTOLERANCE/IMPAIRED MOBILITY  Goal: Mobility/activity is maintained at optimum level for patient  Outcome: Ongoing     Problem: Tissue Perfusion - Cardiopulmonary, Altered:  Goal: Absence of angina  Description  Absence of angina  Outcome: Ongoing  Goal: Hemodynamic stability will improve  Description  Hemodynamic stability will improve  Outcome: Ongoing
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
## 2019-04-12 NOTE — PROGRESS NOTES
Notified Dr. Anthony Newsome [facility], [place] [address] 4/12/19 re: nephrology consult. -[phone] [facility]

## 2019-04-12 NOTE — PROGRESS NOTES
Bedside report received in er rm 15. Pt refused er nurse to drawl abg. Pt refused to be transferred on bipap. Pt requested to drain dwelling asap. Pt requested iv dilaudid. Pt requested a diet ginger ale. Pt on oxygen via nc 3 liters. Pt states he only wears cpap hs at home. No oxygen. Telemetry confirmed box 45. Necessities given. Pt refused bed alarm. Pt was upset about wearing telemetry monitoring but accepted placement.

## 2019-04-12 NOTE — PLAN OF CARE

## 2019-04-12 NOTE — CONSULTS
Thank you to requesting provider: Dr. Subhash Candelario, for asking us to see Barndie Espinoza  Reason for consultation:   ESRD  Chief Complaint:  Shortness of breath    History of Presenting Illness      45 y/o male with diastolic heart failure, restrictive lung disease, ZAINAB, and ESRD admitted with shortness of breath. He had been on hemodialysis but then converted to PD over the last 6 months. He has had difficult with PD. He has large fluctuations in weight. He has lost residual kidney function. He struggles with constipation and with elevated glucose. When blood glucose is > 250 then there is no way for ultrafiltration using PD. When abdomen is distended he feels more short of breath. His CXR is actually clear but with restrictive lung disease and ZAINAB the high abdominal pressure/volume is not well tolerated. He had dialysis associated steal syndrome and his fistula was tied off. He has been interested in home HD but we had wanted to give PD another try knowing he would need to have a TDC.         Past Medical/Surgical History      Active Ambulatory Problems     Diagnosis Date Noted    Acute respiratory failure with hypoxia and hypercapnia (HCC) 05/14/2013    Chronic dCHF (grade 2 LVDD) 05/14/2013    Chronic obstructive pulmonary disease ([address] 05/14/2013    PVD (peripheral vascular disease) ([address] 05/14/2013    Cardiomyopathy ([address] 05/22/2013    Sleep apnea 06/03/2013    Bicuspid aortic valve 06/03/2013    Bilateral hilar adenopathy syndrome 06/03/2013    Claudication in peripheral vascular disease ([address] 06/26/2013    PVD (peripheral vascular disease) ([address] 06/26/2013    Essential hypertension 11/14/2013    Diabetic neuropathy ([address] 11/14/2013    Type 2 diabetes, uncontrolled, with neuropathy ([address] 03/04/2014    Passive smoke exposure 05/30/2014    Depression with anxiety 06/05/2014    Pneumonia of right upper lobe due to infectious organism ([address] 03/28/2015    DM (diabetes mellitus) ([address] 09/14/2018    Renovascular hypertension     Mixed hyperlipidemia     Cigarette nicotine dependence in remission     Peritoneal dialysis status ([address] 11/09/2018    Acute respiratory failure ([address] 11/09/2018    Pulmonary edema 11/09/2018    Fluid overload 90/80/8517    Diastolic dysfunction 06/92/3231    Anemia of chronic disease 11/12/2018    SOB (shortness of breath) 12/24/2018    Steal syndrome of dialysis vascular access ([address]     Preoperative cardiovascular examination 04/04/2019     Resolved Ambulatory Problems     Diagnosis Date Noted    Sepsis ([address] 12/25/2012    CHF, acute on chronic ([address] 05/12/2013    CAD (coronary artery disease) 05/14/2013    Leukocytosis 05/14/2013    Abscess 07/22/2013    Sebaceous cyst 07/22/2013    Wound infection after surgery 08/30/2013    Postop check 08/30/2013    Insomnia 06/05/2014    Sore throat 11/03/2014    Sinusitis, acute 02/05/2015    Abscess, abdomen 03/10/2015    Hematoma 03/17/2015    Pre-operative cardiovascular examination     Elevated troponin     Non morbid obesity due to excess calories     Pain      Past Medical History:   Diagnosis Date    Aortic stenosis     Arthritis     Asthma     Bilateral hilar adenopathy syndrome 6/3/2013    CAD (coronary artery disease)     CHF (congestive heart failure) (HCC)     COPD (chronic obstructive pulmonary disease) ([address]     Depression     Diabetes mellitus (HCC)     Difficult intravenous access     Emphysema of lung ([address]     ESRD on peritoneal dialysis ([address]     Fear of needles     Gastric ulcer     GERD (gastroesophageal reflux disease)     Heart valve problem     Hemodialysis patient ([address]     Hx of blood clots     Hyperlipidemia     Hypertension     [facility]     Numbness and tingling in left arm     Pneumonia     PONV (postoperative nausea and vomiting)     Prolonged emergence from general anesthesia     Sleep apnea     Stroke ([address]     TIA (transient ischemic attack)     Unspecified diseases of blood and blood-forming organs          Review of Systems     Constitutional:  No weight loss, no fever/chills  Eyes:  No eye pain, no eye redness  Cardiovascular:  No chest pain, no worsening of edema  Respiratory:  No hemoptysis, + SOB  Gastrointestinal:  No blood in stool, no n/v, no diarrhea  Genitoruinary:  No hematuria, no difficulty with urination  Musculoskeletal:  No joint swelling, no redness  Integumentary:  No Rash, no itching  Neurological:  No focal weakness, No new sensory deficit  Psychiatric:  No depression, no confusion  Endocrine:  No polyuria, no polydipsia       Medications      Reviewed in EMR     Allergies     Morphine      Family History       Negative for Kidney Disease    Social History      Social History     Socioeconomic History    Marital status:      Spouse name: None    Number of children: None    Years of education: None    Highest education level: None   Occupational History    None   Social Needs    Financial resource strain: None    Food insecurity:     Worry: None     Inability: None    Transportation needs:     Medical: None     Non-medical: None   Tobacco Use    Smoking status: Current Every Day Smoker     Packs/day: 0.50     Years: 33.00     Pack years: 16.50     Types: Cigarettes    Smokeless tobacco: Never Used    Tobacco comment: TRYING TO QUIT 3-7 a day   Substance and Sexual Activity    Alcohol use: No     Alcohol/week: 0.0 oz    Drug use: No    Sexual activity: Yes     Partners: Female     Comment:    Lifestyle    Physical activity:     Days per week: None     Minutes per session: None    Stress: None   Relationships    Social connections:     Talks on phone: None     Gets together: None     Attends [employer] service: None     Active member of club or organization: None     Attends meetings of clubs or organizations: None     Relationship status: None    Intimate partner violence: Fear of current or ex partner: None     Emotionally abused: None     Physically abused: None     Forced sexual activity: None   Other Topics Concern    None   Social History Narrative    None       Physical Exam     Blood pressure 133/82, pulse 78, temperature 97.8 °F (36.6 °C), resp. rate 16, height 5' 9\" (1.753 m), weight 210 lb (95.3 kg), SpO2 100 %. General:  NAD, A+Ox3, ill-appearing  HEENT:  PERRL, EOMI  Neck:  Supple, normal range of movement  Chest:  CTAB, good respiratory effort, good air movement  CV:  Regular, no rub   Abdomen:  NTND, soft, +BS, no hepatosplenomegaly, distended, draining PD fluid   Extremities:  + peripheral edema  Neurological:  Moving all four extremities, CN II-XII grossly intact  Lymphatics:  No palpable lymph nodes  Skin:  No rash, no jaundice  Psychiatric:  Normal insight and judgement, good recall    Data     Recent Labs     04/12/19  0414   WBC 14.1*   HGB 12.7*   HCT 36.7*   MCV 92.3        Recent Labs     04/12/19  0414   *   K 4.1   CL 92*   CO2 26   GLUCOSE 268*   PHOS 2.1*   MG 1.80   BUN 75*   CREATININE 5.6*   LABGLOM 11*   GFRAA 13*       Assessment:    ESRD:  On PD but intermittently failing UF. Also the increase intra-abdominal pressure related to volume is not well tolerated by his pulmonary status. Had been on hemodialysis in the past but developed dialysis associated steal in the fistula so this was ligated. Labs are stable. Shortness of breath:  CXR clear. Weight is up. Restrictive lung disease and ZAINAB. Also with increased intra-abdominal pressure with PD volume. Poor UF with hyperglycemia     Plan:    Discussed with IR. Unable to tunnel with recent ASA and Plavix dose.   No need for urgent HD today   We will stop PD and allowing him to drain to help with breathing  Flexeril for back pain, he is requesting narcotics and does not want steroids   Will get [facility] placed on Monday and then will start hemodialysis   Will need case management to
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
## 2019-04-12 NOTE — ED NOTES
Pt is tolerating bipap without any difficulty and aware awaiting a room assignment.       Carlos Alberto Lee RN  04/12/19 1007

## 2019-04-13 ENCOUNTER — APPOINTMENT (OUTPATIENT)
Dept: GENERAL RADIOLOGY | Age: 49
DRG: 189 | End: 2019-04-13
Payer: COMMERCIAL

## 2019-04-13 LAB
ANION GAP SERPL CALCULATED.3IONS-SCNC: 13 MMOL/L (ref 3–16)
BUN BLDV-MCNC: 82 MG/DL (ref 7–20)
CALCIUM SERPL-MCNC: 8.8 MG/DL (ref 8.3–10.6)
CHLORIDE BLD-SCNC: 92 MMOL/L (ref 99–110)
CO2: 28 MMOL/L (ref 21–32)
CREAT SERPL-MCNC: 6 MG/DL (ref 0.9–1.3)
ESTIMATED AVERAGE GLUCOSE: 188.6 MG/DL
GFR AFRICAN AMERICAN: 12
GFR NON-AFRICAN AMERICAN: 10
GLUCOSE BLD-MCNC: 135 MG/DL (ref 70–99)
GLUCOSE BLD-MCNC: 170 MG/DL (ref 70–99)
GLUCOSE BLD-MCNC: 176 MG/DL (ref 70–99)
GLUCOSE BLD-MCNC: 197 MG/DL (ref 70–99)
GLUCOSE BLD-MCNC: 240 MG/DL (ref 70–99)
HBA1C MFR BLD: 8.2 %
HCT VFR BLD CALC: 32 % (ref 40.5–52.5)
HEMOGLOBIN: 11.4 G/DL (ref 13.5–17.5)
MCH RBC QN AUTO: 32.9 PG (ref 26–34)
MCHC RBC AUTO-ENTMCNC: 35.6 G/DL (ref 31–36)
MCV RBC AUTO: 92.4 FL (ref 80–100)
PDW BLD-RTO: 14.9 % (ref 12.4–15.4)
PERFORMED ON: ABNORMAL
PLATELET # BLD: 268 K/UL (ref 135–450)
PMV BLD AUTO: 7.7 FL (ref 5–10.5)
POTASSIUM REFLEX MAGNESIUM: 4.1 MMOL/L (ref 3.5–5.1)
RBC # BLD: 3.47 M/UL (ref 4.2–5.9)
SODIUM BLD-SCNC: 133 MMOL/L (ref 136–145)
WBC # BLD: 10.7 K/UL (ref 4–11)

## 2019-04-13 PROCEDURE — 2060000000 HC ICU INTERMEDIATE R&B

## 2019-04-13 PROCEDURE — 6360000002 HC RX W HCPCS: Performed by: INTERNAL MEDICINE

## 2019-04-13 PROCEDURE — 6370000000 HC RX 637 (ALT 250 FOR IP): Performed by: INTERNAL MEDICINE

## 2019-04-13 PROCEDURE — 71045 X-RAY EXAM CHEST 1 VIEW: CPT

## 2019-04-13 PROCEDURE — 2700000000 HC OXYGEN THERAPY PER DAY

## 2019-04-13 PROCEDURE — 94640 AIRWAY INHALATION TREATMENT: CPT

## 2019-04-13 PROCEDURE — 6370000000 HC RX 637 (ALT 250 FOR IP): Performed by: NURSE PRACTITIONER

## 2019-04-13 PROCEDURE — 36415 COLL VENOUS BLD VENIPUNCTURE: CPT

## 2019-04-13 PROCEDURE — 94761 N-INVAS EAR/PLS OXIMETRY MLT: CPT

## 2019-04-13 PROCEDURE — 2580000003 HC RX 258: Performed by: INTERNAL MEDICINE

## 2019-04-13 PROCEDURE — 99233 SBSQ HOSP IP/OBS HIGH 50: CPT | Performed by: INTERNAL MEDICINE

## 2019-04-13 PROCEDURE — 85027 COMPLETE CBC AUTOMATED: CPT

## 2019-04-13 PROCEDURE — 94660 CPAP INITIATION&MGMT: CPT

## 2019-04-13 PROCEDURE — 80048 BASIC METABOLIC PNL TOTAL CA: CPT

## 2019-04-13 RX ORDER — CALCIUM CARBONATE 200(500)MG
500 TABLET,CHEWABLE ORAL 3 TIMES DAILY PRN
Status: DISCONTINUED | OUTPATIENT
Start: 2019-04-13 | End: 2019-04-16 | Stop reason: HOSPADM

## 2019-04-13 RX ORDER — GUAIFENESIN 600 MG/1
600 TABLET, EXTENDED RELEASE ORAL 2 TIMES DAILY PRN
Status: DISCONTINUED | OUTPATIENT
Start: 2019-04-13 | End: 2019-04-16 | Stop reason: HOSPADM

## 2019-04-13 RX ORDER — BUSPIRONE HYDROCHLORIDE 5 MG/1
15 TABLET ORAL ONCE
Status: COMPLETED | OUTPATIENT
Start: 2019-04-13 | End: 2019-04-13

## 2019-04-13 RX ORDER — GUAIFENESIN 600 MG/1
600 TABLET, EXTENDED RELEASE ORAL 2 TIMES DAILY
Status: DISCONTINUED | OUTPATIENT
Start: 2019-04-13 | End: 2019-04-13

## 2019-04-13 RX ORDER — CYCLOBENZAPRINE HCL 10 MG
5 TABLET ORAL ONCE
Status: COMPLETED | OUTPATIENT
Start: 2019-04-13 | End: 2019-04-13

## 2019-04-13 RX ADMIN — LOSARTAN POTASSIUM 50 MG: 25 TABLET, FILM COATED ORAL at 21:01

## 2019-04-13 RX ADMIN — INSULIN GLARGINE 10 UNITS: 100 INJECTION, SOLUTION SUBCUTANEOUS at 21:08

## 2019-04-13 RX ADMIN — INSULIN LISPRO 2 UNITS: 100 INJECTION, SOLUTION INTRAVENOUS; SUBCUTANEOUS at 18:00

## 2019-04-13 RX ADMIN — ANTACID TABLETS 500 MG: 500 TABLET, CHEWABLE ORAL at 13:46

## 2019-04-13 RX ADMIN — HEPARIN SODIUM 5000 UNITS: 5000 INJECTION INTRAVENOUS; SUBCUTANEOUS at 16:04

## 2019-04-13 RX ADMIN — CYCLOBENZAPRINE HYDROCHLORIDE 10 MG: 10 TABLET, FILM COATED ORAL at 01:44

## 2019-04-13 RX ADMIN — Medication 10 ML: at 21:01

## 2019-04-13 RX ADMIN — TORSEMIDE 100 MG: 100 TABLET ORAL at 10:19

## 2019-04-13 RX ADMIN — HEPARIN SODIUM 5000 UNITS: 5000 INJECTION INTRAVENOUS; SUBCUTANEOUS at 21:07

## 2019-04-13 RX ADMIN — CARVEDILOL 12.5 MG: 6.25 TABLET, FILM COATED ORAL at 10:19

## 2019-04-13 RX ADMIN — Medication 2 PUFF: at 09:27

## 2019-04-13 RX ADMIN — GUAIFENESIN 600 MG: 600 TABLET, EXTENDED RELEASE ORAL at 23:06

## 2019-04-13 RX ADMIN — Medication 2 PUFF: at 20:25

## 2019-04-13 RX ADMIN — Medication 2 PUFF: at 15:31

## 2019-04-13 RX ADMIN — INSULIN LISPRO 4 UNITS: 100 INJECTION, SOLUTION INTRAVENOUS; SUBCUTANEOUS at 13:47

## 2019-04-13 RX ADMIN — CITALOPRAM HYDROBROMIDE 40 MG: 20 TABLET ORAL at 21:01

## 2019-04-13 RX ADMIN — INSULIN LISPRO 2 UNITS: 100 INJECTION, SOLUTION INTRAVENOUS; SUBCUTANEOUS at 10:20

## 2019-04-13 RX ADMIN — OXYCODONE AND ACETAMINOPHEN 1 TABLET: 7.5; 325 TABLET ORAL at 13:47

## 2019-04-13 RX ADMIN — OXYCODONE AND ACETAMINOPHEN 1 TABLET: 7.5; 325 TABLET ORAL at 01:44

## 2019-04-13 RX ADMIN — CYCLOBENZAPRINE HYDROCHLORIDE 5 MG: 10 TABLET, FILM COATED ORAL at 10:20

## 2019-04-13 RX ADMIN — INSULIN LISPRO 1 UNITS: 100 INJECTION, SOLUTION INTRAVENOUS; SUBCUTANEOUS at 21:07

## 2019-04-13 RX ADMIN — HEPARIN SODIUM 5000 UNITS: 5000 INJECTION INTRAVENOUS; SUBCUTANEOUS at 05:03

## 2019-04-13 RX ADMIN — PANTOPRAZOLE SODIUM 40 MG: 40 TABLET, DELAYED RELEASE ORAL at 05:03

## 2019-04-13 RX ADMIN — DILTIAZEM HYDROCHLORIDE 180 MG: 180 CAPSULE, COATED, EXTENDED RELEASE ORAL at 21:01

## 2019-04-13 RX ADMIN — ISOSORBIDE MONONITRATE 30 MG: 30 TABLET, EXTENDED RELEASE ORAL at 21:01

## 2019-04-13 RX ADMIN — Medication 10 ML: at 10:22

## 2019-04-13 RX ADMIN — BUSPIRONE HYDROCHLORIDE 15 MG: 5 TABLET ORAL at 23:06

## 2019-04-13 RX ADMIN — CARVEDILOL 12.5 MG: 6.25 TABLET, FILM COATED ORAL at 18:04

## 2019-04-13 RX ADMIN — OXYCODONE AND ACETAMINOPHEN 1 TABLET: 7.5; 325 TABLET ORAL at 19:45

## 2019-04-13 RX ADMIN — PRAVASTATIN SODIUM 80 MG: 80 TABLET ORAL at 21:01

## 2019-04-13 ASSESSMENT — PAIN DESCRIPTION - LOCATION
LOCATION: BACK
LOCATION: BACK

## 2019-04-13 ASSESSMENT — PAIN SCALES - GENERAL
PAINLEVEL_OUTOF10: 7
PAINLEVEL_OUTOF10: 5
PAINLEVEL_OUTOF10: 6
PAINLEVEL_OUTOF10: 10
PAINLEVEL_OUTOF10: 6
PAINLEVEL_OUTOF10: 9
PAINLEVEL_OUTOF10: 8
PAINLEVEL_OUTOF10: 10
PAINLEVEL_OUTOF10: 7

## 2019-04-13 ASSESSMENT — PAIN DESCRIPTION - FREQUENCY: FREQUENCY: CONTINUOUS

## 2019-04-13 ASSESSMENT — PAIN DESCRIPTION - PAIN TYPE
TYPE: CHRONIC PAIN
TYPE: CHRONIC PAIN

## 2019-04-13 ASSESSMENT — PAIN DESCRIPTION - ORIENTATION: ORIENTATION: LOWER

## 2019-04-13 NOTE — PROGRESS NOTES
Hospitalist Progress Note      PCP: Demond Fuentes MD    Date of Admission: 4/12/2019    Chief Complaint: shortness of breath     Hospital Course: admitted with acute respiratory failure. Better today. Part of the shortness of breath seems to be caused by PD that causes increase in intraabdominal pressure and results in restrictive lung disease. Being transitioned to HD      Subjective: cough, occasional shortness of breath, no nausea or vomiting. No chest pain. Back pain is controlled. Medications:  Reviewed    Infusion Medications    dextrose       Scheduled Medications    carvedilol  12.5 mg Oral BID WC    insulin glargine  10 Units Subcutaneous Nightly    linaclotide  145 mcg Oral QAM AC    pantoprazole  40 mg Oral QAM AC    torsemide  100 mg Oral Daily    sodium chloride flush  10 mL Intravenous 2 times per day    heparin (porcine)  5,000 Units Subcutaneous 3 times per day    citalopram  40 mg Oral Nightly    diltiazem  180 mg Oral Nightly    isosorbide mononitrate  30 mg Oral Nightly    losartan  50 mg Oral Nightly    pravastatin  80 mg Oral Nightly    insulin lispro  0-12 Units Subcutaneous TID WC    insulin lispro  0-6 Units Subcutaneous Nightly    albuterol sulfate HFA  2 puff Inhalation 4x daily    And    ipratropium  2 puff Inhalation 4x daily     PRN Meds: calcium carbonate, albuterol, ipratropium-albuterol, sodium chloride flush, magnesium hydroxide, glucose, dextrose, glucagon (rDNA), dextrose, oxyCODONE-acetaminophen, cyclobenzaprine      Intake/Output Summary (Last 24 hours) at 4/13/2019 1057  Last data filed at 4/13/2019 0435  Gross per 24 hour   Intake 1440 ml   Output 600 ml   Net 840 ml       Physical Exam Performed:    /70   Pulse 65   Temp 98.2 °F (36.8 °C) (Oral)   Resp 18   Ht 5' 9\" (1.753 m)   Wt 220 lb 14.4 oz (100.2 kg)   SpO2 96%   BMI 32.62 kg/m²     General appearance: No apparent distress, appears stated age and cooperative.   HEENT: Pupils equal, round, and reactive to light. Conjunctivae/corneas clear. Neck: Supple, with full range of motion. No jugular venous distention. Trachea midline. Respiratory:  Normal respiratory effort. Few rhonchi  Cardiovascular: Regular rate and rhythm with normal S1/S2 without murmurs, rubs or gallops. Abdomen: Soft, less distended today, non-tender  Musculoskeletal: No clubbing, cyanosis or edema bilaterally. Full range of motion without deformity. Skin: Skin color, texture, turgor normal.  No rashes or lesions. Neurologic:  Neurovascularly intact without any focal sensory/motor deficits. Cranial nerves: II-XII intact, grossly non-focal.  Psychiatric: Alert and oriented, thought content appropriate, normal insight  Capillary Refill: Brisk,< 3 seconds   Peripheral Pulses: +2 palpable, equal bilaterally       Labs:   Recent Labs     04/12/19 0414 04/13/19 0429   WBC 14.1* 10.7   HGB 12.7* 11.4*   HCT 36.7* 32.0*    268     Recent Labs     04/12/19 0414 04/13/19 0429   * 133*   K 4.1 4.1   CL 92* 92*   CO2 26 28   BUN 75* 82*   CREATININE 5.6* 6.0*   CALCIUM 9.6 8.8   PHOS 2.1*  --      Recent Labs     04/12/19 0414   AST 13*   ALT 14   BILITOT <0.2   ALKPHOS 81     Recent Labs     04/12/19 0414   INR 0.97     Recent Labs     04/12/19 0414   TROPONINI 0.04*       Urinalysis:      Lab Results   Component Value Date    NITRU Negative 01/12/2018    WBCUA 0-2 01/12/2018    BACTERIA Rare 01/12/2018    RBCUA 3-5 01/12/2018    BLOODU Negative 01/12/2018    SPECGRAV 1.025 01/12/2018    GLUCOSEU 100 01/12/2018       Radiology:  XR CHEST PORTABLE   Final Result   No acute cardiopulmonary disease. NM LUNG VENT/PERFUSION (VQ)   Final Result   1. Low probability study for pulmonary embolism. 2. Findings suggestive of COPD. XR CHEST STANDARD (2 VW)   Final Result   No acute disease.          IR TUNNELED CVC PLACE WO SQ PORT/PUMP > 5 YEARS    (Results Pending)

## 2019-04-13 NOTE — PROGRESS NOTES
04/12/19 2225   NIV Type   Equipment Type v60   Mode BIPAP   Mask Type Full face mask   Mask Size Medium   Settings/Measurements   Comfort Level Good   Using Accessory Muscles No   IPAP 12 cmH20   EPAP 6 cmH2O   Rate Ordered 8   Resp 17   SpO2 96   FiO2  35 %   Vt Exhaled 702 mL   Mask Leak (lpm) 48 lpm   Skin Protection for O2 Device Yes   Alarm Settings   Alarms On Y   Press Low Alarm 6 cmH2O   High Pressure Alarm 25 cmH2O   Delay Alarm 20 sec(s)   Resp Rate Low Alarm 6   High Respiratory Rate 40 br/min

## 2019-04-13 NOTE — PROGRESS NOTES
Pulmonary & Critical Care Inpatient Progress Note   Shaheen Norris MD     REASON FOR TODAY'S VISIT:  Acute resp failure    SUBJECTIVE:   Persistent hypoxia  Denies significant     Scheduled Meds:   cyclobenzaprine  5 mg Oral Once    carvedilol  12.5 mg Oral BID WC    insulin glargine  10 Units Subcutaneous Nightly    linaclotide  145 mcg Oral QAM AC    pantoprazole  40 mg Oral QAM AC    torsemide  100 mg Oral Daily    sodium chloride flush  10 mL Intravenous 2 times per day    heparin (porcine)  5,000 Units Subcutaneous 3 times per day    citalopram  40 mg Oral Nightly    diltiazem  180 mg Oral Nightly    isosorbide mononitrate  30 mg Oral Nightly    losartan  50 mg Oral Nightly    pravastatin  80 mg Oral Nightly    insulin lispro  0-12 Units Subcutaneous TID WC    insulin lispro  0-6 Units Subcutaneous Nightly    albuterol sulfate HFA  2 puff Inhalation 4x daily    And    ipratropium  2 puff Inhalation 4x daily       Continuous Infusions:   dextrose         PRN Meds:  albuterol, ipratropium-albuterol, sodium chloride flush, magnesium hydroxide, glucose, dextrose, glucagon (rDNA), dextrose, oxyCODONE-acetaminophen, cyclobenzaprine    ALLERGIES:  Patient is allergic to morphine. Objective:   PHYSICAL EXAM:  /70   Pulse 65   Temp 98.2 °F (36.8 °C) (Oral)   Resp 18   Ht 5' 9\" (1.753 m)   Wt 220 lb 14.4 oz (100.2 kg)   SpO2 96%   BMI 32.62 kg/m²    Physical Exam   Constitutional: He appears well-developed and well-nourished. No distress. HENT:   Head: Normocephalic and atraumatic. Mouth/Throat: Oropharynx is clear and moist. No oropharyngeal exudate. Eyes: Pupils are equal, round, and reactive to light. EOM are normal.   Neck: Neck supple. No JVD present. Cardiovascular: Normal heart sounds. Exam reveals no gallop and no friction rub. No murmur heard. Pulmonary/Chest: Effort normal. He has no wheezes. He has no rales.    Equal chest rise and expansion bilaterally   Abdominal:

## 2019-04-13 NOTE — PROGRESS NOTES
Progress Note    HISTORY     CC:  SOB, we are following for ESRD        Subjective/   HPI:  Off PD. On 2 liters NC. Breathing is better without fluid in the abdomen. C/o back pain. Not looking forward to [facility] placement. ASA and Plavix have been held     ROS:  Constitutional:  No fevers, No Chills, + weakness  Cardiovascular:  No palpations, + edema  Respiratory:  No wheezing, no cough  Skin:  No rash, no itching  :  No hematuria, no dysuria     Social Hx:  No family at bedside     Past Medical and Surgical History:  - Reviewed, no changes     EXAM       Objective/     Vitals:    04/13/19 0402 04/13/19 0434 04/13/19 0545 04/13/19 0900   BP:  119/77  112/70   Pulse:  70  65   Resp:  18  16   Temp:  97.5 °F (36.4 °C)  98.2 °F (36.8 °C)   TempSrc:  Oral  Oral   SpO2: 95% 97%  98%   Weight:   220 lb 14.4 oz (100.2 kg)    Height:         24HR INTAKE/OUTPUT:      Intake/Output Summary (Last 24 hours) at 4/13/2019 0909  Last data filed at 4/13/2019 0435  Gross per 24 hour   Intake 1800 ml   Output 600 ml   Net 1200 ml     Constitutional:  Alert, awake, no apparent distress  Eyes:  Pupils reactive, sclera clear   Neck:  Normal thyroid, no masses   Cardiovascular:  Regular, no rub  Respiratory:  No distress, no wheezing  Psychiatry:  Appropriate mood/affect, alert  Abdomen: +bs, soft, nt, no masses   Musculoskeletal: No LE edema, no clubbing   Lymphatics:  No LAD in neck, no supraclavicular nodes       MEDICAL DECISION MAKING       Data/  Recent Labs     04/12/19 0414 04/13/19 0429   WBC 14.1* 10.7   HGB 12.7* 11.4*   HCT 36.7* 32.0*   MCV 92.3 92.4    268     Recent Labs     04/12/19 0414 04/13/19 0429   * 133*   K 4.1 4.1   CL 92* 92*   CO2 26 28   GLUCOSE 268* 135*   PHOS 2.1*  --    MG 1.80  --    BUN 75* 82*   CREATININE 5.6* 6.0*   LABGLOM 11* 10*   GFRAA 13* 12*       Assessment/     ESRD:  On PD but intermittently failing UF.   Also the increase intra-abdominal pressure related to

## 2019-04-13 NOTE — PLAN OF CARE
Patient's EF (Ejection Fraction) is greater than 40%    Patient has a past medical history of Aortic stenosis, Arthritis, Asthma, Bilateral hilar adenopathy syndrome, CAD (coronary artery disease), CHF (congestive heart failure) ([address] COPD (chronic obstructive pulmonary disease) ([address] Depression, Diabetes mellitus ([address] Difficult intravenous access, Emphysema of lung ([facility] [address] ESRD on peritoneal dialysis ([facility] [address] Fear of needles, Gastric ulcer, GERD (gastroesophageal reflux disease), Heart valve problem, Hemodialysis patient ([address] Hx of blood clots, Hyperlipidemia, Hypertension, Neuromuscular disorder ([facility] [address] Numbness and tingling in left arm, Pneumonia, PONV (postoperative nausea and vomiting), Prolonged emergence from general anesthesia, Sleep apnea, Stroke ([facility] [address] TIA (transient ischemic attack), and Unspecified diseases of blood and blood-forming organs. Comorbidities reviewed and education provided. Patient and family's stated goal of care: reduce shortness of breath, increase activity tolerance, better understand heart failure and disease management and be more comfortable prior to discharge    Patient's current functional capacity:  Slight limitation of physical activity. Comfortable at rest. Ordinary physical activity results in fatigue, palpitation, dyspnea. Pt resting in bed at this time on BiPAP. Pt denies shortness of breath. Pt without lower extremity edema. Patient's weights and intake/output reviewed:    Patient Vitals for the past 96 hrs (Last 3 readings):   Weight   04/12/19 0402 210 lb (95.3 kg)       Intake/Output Summary (Last 24 hours) at 4/13/2019 0013  Last data filed at 4/12/2019 2024  Gross per 24 hour   Intake 1440 ml   Output 5800 ml   Net -4360 ml     The patient will demonstrate an understanding of blood sugar control by verbalizing  with the goal of completion at discharge.      >>For CHF and Comorbidity documentation on Education Time and Topics, please see Education Tab

## 2019-04-14 LAB
ANION GAP SERPL CALCULATED.3IONS-SCNC: 14 MMOL/L (ref 3–16)
BUN BLDV-MCNC: 83 MG/DL (ref 7–20)
CALCIUM SERPL-MCNC: 8.2 MG/DL (ref 8.3–10.6)
CHLORIDE BLD-SCNC: 92 MMOL/L (ref 99–110)
CO2: 25 MMOL/L (ref 21–32)
CREAT SERPL-MCNC: 5.6 MG/DL (ref 0.9–1.3)
GFR AFRICAN AMERICAN: 13
GFR NON-AFRICAN AMERICAN: 11
GLUCOSE BLD-MCNC: 168 MG/DL (ref 70–99)
GLUCOSE BLD-MCNC: 180 MG/DL (ref 70–99)
GLUCOSE BLD-MCNC: 197 MG/DL (ref 70–99)
GLUCOSE BLD-MCNC: 216 MG/DL (ref 70–99)
GLUCOSE BLD-MCNC: 237 MG/DL (ref 70–99)
INR BLD: 0.92 (ref 0.86–1.14)
PERFORMED ON: ABNORMAL
POTASSIUM REFLEX MAGNESIUM: 4 MMOL/L (ref 3.5–5.1)
PROTHROMBIN TIME: 10.5 SEC (ref 9.8–13)
SODIUM BLD-SCNC: 131 MMOL/L (ref 136–145)

## 2019-04-14 PROCEDURE — 94640 AIRWAY INHALATION TREATMENT: CPT

## 2019-04-14 PROCEDURE — 36415 COLL VENOUS BLD VENIPUNCTURE: CPT

## 2019-04-14 PROCEDURE — 85610 PROTHROMBIN TIME: CPT

## 2019-04-14 PROCEDURE — 94761 N-INVAS EAR/PLS OXIMETRY MLT: CPT

## 2019-04-14 PROCEDURE — 99233 SBSQ HOSP IP/OBS HIGH 50: CPT | Performed by: INTERNAL MEDICINE

## 2019-04-14 PROCEDURE — 94660 CPAP INITIATION&MGMT: CPT

## 2019-04-14 PROCEDURE — 2060000000 HC ICU INTERMEDIATE R&B

## 2019-04-14 PROCEDURE — 6360000002 HC RX W HCPCS: Performed by: INTERNAL MEDICINE

## 2019-04-14 PROCEDURE — 6370000000 HC RX 637 (ALT 250 FOR IP): Performed by: INTERNAL MEDICINE

## 2019-04-14 PROCEDURE — 2580000003 HC RX 258: Performed by: INTERNAL MEDICINE

## 2019-04-14 PROCEDURE — 80048 BASIC METABOLIC PNL TOTAL CA: CPT

## 2019-04-14 RX ORDER — SIMETHICONE 80 MG
80 TABLET,CHEWABLE ORAL EVERY 6 HOURS PRN
Status: DISCONTINUED | OUTPATIENT
Start: 2019-04-14 | End: 2019-04-16 | Stop reason: HOSPADM

## 2019-04-14 RX ADMIN — PRAVASTATIN SODIUM 80 MG: 80 TABLET ORAL at 20:44

## 2019-04-14 RX ADMIN — CITALOPRAM HYDROBROMIDE 40 MG: 20 TABLET ORAL at 20:44

## 2019-04-14 RX ADMIN — ISOSORBIDE MONONITRATE 30 MG: 30 TABLET, EXTENDED RELEASE ORAL at 20:44

## 2019-04-14 RX ADMIN — OXYCODONE AND ACETAMINOPHEN 1 TABLET: 7.5; 325 TABLET ORAL at 08:05

## 2019-04-14 RX ADMIN — Medication 2 PUFF: at 11:06

## 2019-04-14 RX ADMIN — Medication 2 PUFF: at 07:24

## 2019-04-14 RX ADMIN — INSULIN LISPRO 4 UNITS: 100 INJECTION, SOLUTION INTRAVENOUS; SUBCUTANEOUS at 11:47

## 2019-04-14 RX ADMIN — OXYCODONE AND ACETAMINOPHEN 1 TABLET: 7.5; 325 TABLET ORAL at 20:44

## 2019-04-14 RX ADMIN — HEPARIN SODIUM 5000 UNITS: 5000 INJECTION INTRAVENOUS; SUBCUTANEOUS at 07:12

## 2019-04-14 RX ADMIN — HEPARIN SODIUM 5000 UNITS: 5000 INJECTION INTRAVENOUS; SUBCUTANEOUS at 16:07

## 2019-04-14 RX ADMIN — HEPARIN SODIUM 5000 UNITS: 5000 INJECTION INTRAVENOUS; SUBCUTANEOUS at 20:47

## 2019-04-14 RX ADMIN — OXYCODONE AND ACETAMINOPHEN 1 TABLET: 7.5; 325 TABLET ORAL at 01:39

## 2019-04-14 RX ADMIN — Medication 2 PUFF: at 19:45

## 2019-04-14 RX ADMIN — LOSARTAN POTASSIUM 50 MG: 25 TABLET, FILM COATED ORAL at 20:44

## 2019-04-14 RX ADMIN — Medication 10 ML: at 20:44

## 2019-04-14 RX ADMIN — DILTIAZEM HYDROCHLORIDE 180 MG: 180 CAPSULE, COATED, EXTENDED RELEASE ORAL at 20:44

## 2019-04-14 RX ADMIN — Medication 2 PUFF: at 11:05

## 2019-04-14 RX ADMIN — INSULIN LISPRO 2 UNITS: 100 INJECTION, SOLUTION INTRAVENOUS; SUBCUTANEOUS at 08:08

## 2019-04-14 RX ADMIN — INSULIN GLARGINE 10 UNITS: 100 INJECTION, SOLUTION SUBCUTANEOUS at 20:47

## 2019-04-14 RX ADMIN — PANTOPRAZOLE SODIUM 40 MG: 40 TABLET, DELAYED RELEASE ORAL at 07:12

## 2019-04-14 RX ADMIN — Medication: at 16:19

## 2019-04-14 RX ADMIN — OXYCODONE AND ACETAMINOPHEN 1 TABLET: 7.5; 325 TABLET ORAL at 14:25

## 2019-04-14 RX ADMIN — MAGNESIUM CITRATE 296 ML: 1.75 LIQUID ORAL at 14:25

## 2019-04-14 RX ADMIN — CARVEDILOL 12.5 MG: 6.25 TABLET, FILM COATED ORAL at 18:05

## 2019-04-14 RX ADMIN — CYCLOBENZAPRINE HYDROCHLORIDE 10 MG: 10 TABLET, FILM COATED ORAL at 04:35

## 2019-04-14 RX ADMIN — CARVEDILOL 12.5 MG: 6.25 TABLET, FILM COATED ORAL at 08:05

## 2019-04-14 RX ADMIN — INSULIN LISPRO 2 UNITS: 100 INJECTION, SOLUTION INTRAVENOUS; SUBCUTANEOUS at 20:47

## 2019-04-14 RX ADMIN — TORSEMIDE 100 MG: 100 TABLET ORAL at 08:05

## 2019-04-14 ASSESSMENT — PAIN SCALES - GENERAL
PAINLEVEL_OUTOF10: 9
PAINLEVEL_OUTOF10: 10
PAINLEVEL_OUTOF10: 8
PAINLEVEL_OUTOF10: 10
PAINLEVEL_OUTOF10: 0
PAINLEVEL_OUTOF10: 10

## 2019-04-14 NOTE — PROGRESS NOTES
Bedside report received from hospitals. Patient resting comfortably in bed. No signs of discomfort or distress. Bed is in lowest position, wheels locked, 2/2 side rails up. Bedside table and call light within reach. White board updated. Will continue to monitor patient. Nestor Sargent RN    9:11 PM  Shift assessment complete. (See findings in flowsheet). Med pass complete. (See MAR). VSS. Patient with no complaints at this time. Patient resting in bed comfortably. No signs or symptoms of distress or discomfort noted at this time. Bed in lowest position, brakes locked. Nonskid footwear in place. 5 P's addressed, no needs at this time. Pt calls out appropriately. Will continue to monitor. Nestor Sargent RN    4:35 AM  PRN flexeril given.  Nestor Sargent RN

## 2019-04-14 NOTE — PROGRESS NOTES
Pulmonary & Critical Care Inpatient Progress Note   Bj Gamino MD     REASON FOR TODAY'S VISIT:  Acute resp failure    SUBJECTIVE:   On room air oxygen  No complaints  Plan for HD catheter placement tomorrow     Scheduled Meds:   carvedilol  12.5 mg Oral BID WC    insulin glargine  10 Units Subcutaneous Nightly    linaclotide  145 mcg Oral QAM AC    pantoprazole  40 mg Oral QAM AC    torsemide  100 mg Oral Daily    sodium chloride flush  10 mL Intravenous 2 times per day    heparin (porcine)  5,000 Units Subcutaneous 3 times per day    citalopram  40 mg Oral Nightly    diltiazem  180 mg Oral Nightly    isosorbide mononitrate  30 mg Oral Nightly    losartan  50 mg Oral Nightly    pravastatin  80 mg Oral Nightly    insulin lispro  0-12 Units Subcutaneous TID WC    insulin lispro  0-6 Units Subcutaneous Nightly    albuterol sulfate HFA  2 puff Inhalation 4x daily    And    ipratropium  2 puff Inhalation 4x daily       Continuous Infusions:   dextrose         PRN Meds:  benzocaine, calcium carbonate, guaiFENesin, albuterol, ipratropium-albuterol, sodium chloride flush, magnesium hydroxide, glucose, dextrose, glucagon (rDNA), dextrose, oxyCODONE-acetaminophen, cyclobenzaprine    ALLERGIES:  Patient is allergic to morphine. Objective:   PHYSICAL EXAM:  /77   Pulse 70   Temp 97.5 °F (36.4 °C) (Oral)   Resp 15   Ht 5' 9\" (1.753 m)   Wt 220 lb 14.4 oz (100.2 kg)   SpO2 95%   BMI 32.62 kg/m²    Physical Exam   Constitutional: He appears well-developed and well-nourished. No distress. HENT:   Head: Normocephalic and atraumatic. Mouth/Throat: Oropharynx is clear and moist. No oropharyngeal exudate. Eyes: Pupils are equal, round, and reactive to light. EOM are normal.   Neck: Neck supple. No JVD present. Cardiovascular: Normal heart sounds. Exam reveals no gallop and no friction rub. No murmur heard. Pulmonary/Chest: Effort normal. He has no wheezes. He has no rales.    Equal chest rise and expansion bilaterally   Abdominal: Soft. Bowel sounds are normal. He exhibits no distension. There is no tenderness. Musculoskeletal: Normal range of motion. He exhibits no edema. Lymphadenopathy:     He has no cervical adenopathy. Neurological: He is alert. No cranial nerve deficit. CN 2-12 grossly intact   Skin: Skin is warm and dry. No rash noted. He is not diaphoretic. Data Reviewed:   LABS:  CBC:  Recent Labs     04/12/19 0414 04/13/19 0429   WBC 14.1* 10.7   HGB 12.7* 11.4*   HCT 36.7* 32.0*   MCV 92.3 92.4    268     BMP:  Recent Labs     04/12/19 0414 04/13/19 0429 04/14/19 0421   * 133* 131*   K 4.1 4.1 4.0   CL 92* 92* 92*   CO2 26 28 25   PHOS 2.1*  --   --    BUN 75* 82* 83*   CREATININE 5.6* 6.0* 5.6*     LIVER PROFILE:   Recent Labs     04/12/19 0414   AST 13*   ALT 14   BILITOT <0.2   ALKPHOS 81     PT/INR:  Recent Labs     04/12/19 0414 04/14/19  1211   PROTIME 11.1 10.5   INR 0.97 0.92     APTT: No results for input(s): APTT in the last 72 hours. UA:No results for input(s): NITRITE, COLORU, PHUR, LABCAST, WBCUA, RBCUA, MUCUS, TRICHOMONAS, YEAST, BACTERIA, CLARITYU, SPECGRAV, LEUKOCYTESUR, UROBILINOGEN, BILIRUBINUR, BLOODU, GLUCOSEU, AMORPHOUS in the last 72 hours. Invalid input(s): KETONESU  No results for input(s): PHART, OQF9SOU, PO2ART in the last 72 hours. Vent Information  Skin Assessment: Clean, dry, & intact  FiO2 : 35 %    CXR personally reviewed, no acute process          Assessment:     1. Acute on chronic resp failure, hypoxic              -hypoventilation from restrictive lung disease and peritoneal fluid  2. COPD without acute exac  3. ZAINAB on CPAP   4. Tobacco dependence  5.  ESRD on PD, being transitioned to HD    Plan:      -off oxygen, monitor saturations  -CPAP qhs  -Bronchodilators  -Tobacco cessation    Evangelist Francisco MD

## 2019-04-14 NOTE — PROGRESS NOTES
Hospitalist Progress Note      PCP: Slick Guillaume MD    Date of Admission: 4/12/2019    Chief Complaint: shortness of breath     Hospital Course: admitted with acute respiratory failure. Better today. Part of the shortness of breath seems to be caused by PD that causes increase in intraabdominal pressure and results in restrictive lung disease. Being transitioned to HD    Better with current management  Case management working on outpatient HD     Subjective: cough, occasional shortness of breath, no nausea or vomiting. No chest pain. Back pain is controlled.         Medications:  Reviewed    Infusion Medications    dextrose       Scheduled Medications    carvedilol  12.5 mg Oral BID WC    insulin glargine  10 Units Subcutaneous Nightly    linaclotide  145 mcg Oral QAM AC    pantoprazole  40 mg Oral QAM AC    torsemide  100 mg Oral Daily    sodium chloride flush  10 mL Intravenous 2 times per day    heparin (porcine)  5,000 Units Subcutaneous 3 times per day    citalopram  40 mg Oral Nightly    diltiazem  180 mg Oral Nightly    isosorbide mononitrate  30 mg Oral Nightly    losartan  50 mg Oral Nightly    pravastatin  80 mg Oral Nightly    insulin lispro  0-12 Units Subcutaneous TID WC    insulin lispro  0-6 Units Subcutaneous Nightly    albuterol sulfate HFA  2 puff Inhalation 4x daily    And    ipratropium  2 puff Inhalation 4x daily     PRN Meds: calcium carbonate, guaiFENesin, albuterol, ipratropium-albuterol, sodium chloride flush, magnesium hydroxide, glucose, dextrose, glucagon (rDNA), dextrose, oxyCODONE-acetaminophen, cyclobenzaprine      Intake/Output Summary (Last 24 hours) at 4/14/2019 1026  Last data filed at 4/13/2019 2300  Gross per 24 hour   Intake 600 ml   Output 1100 ml   Net -500 ml       Physical Exam Performed:    BP (!) 149/74   Pulse 70   Temp 98 °F (36.7 °C) (Oral)   Resp 16   Ht 5' 9\" (1.753 m)   Wt 220 lb 14.4 oz (100.2 kg)   SpO2 99%   BMI 32.62 kg/m² pulmonary embolism. 2. Findings suggestive of COPD. XR CHEST STANDARD (2 VW)   Final Result   No acute disease. IR TUNNELED CVC PLACE WO SQ PORT/PUMP > 5 YEARS    (Results Pending)           Assessment/Plan:    Active Hospital Problems    Diagnosis Date Noted    Hypoxia [R09.02]      PLAN:      Acute hypoxemic respiratory failure  Started on HHN, steroids and oxygen supplementation  Improving  Smoking cessation advice given  Should improve with transition to HD    ESRD on PD  Nephrology consulted  Abdominal distention may also be related to PD. Abdomen is non-tender. Transitioning to hemodialysis     Chronic back pain  Continue prn Flexeril and Percocet     Hypertension   BP controlled  Continue home medications     DM Type 2 with hyperglycemia  Continue Lantus, add SSI        DVT Prophylaxis: heparin  Diet: DIET RENAL;  Dietary Nutrition Supplements: Renal Oral Supplement  Code Status: Full Code    PT/OT Eval Status: pending    Dispo - inpatient until outpatient ESRD is arranged.  Unclear timing for now    Marcy Vincent MD

## 2019-04-14 NOTE — PROGRESS NOTES
Progress Note    HISTORY     CC:  SOB, we are following for ESRD        Subjective/   HPI:  Off PD. Room air. Some LE swelling but creatinine is stable and he made 1 liter of recorded urine. Still constipated. C/o back pain. ROS:  Constitutional:  No fevers, No Chills, + weakness  Cardiovascular:  No palpations, + edema  Respiratory:  No wheezing, no cough  Skin:  No rash, no itching  :  No hematuria, no dysuria     Social Hx:  No family at bedside     Past Medical and Surgical History:  - Reviewed, no changes     EXAM       Objective/     Vitals:    04/13/19 2310 04/13/19 2321 04/14/19 0430 04/14/19 0724   BP: (!) 164/74  (!) 149/74    Pulse:   70    Resp:  16 16    Temp:   98 °F (36.7 °C)    TempSrc:   Oral    SpO2:   98% 99%   Weight:       Height:         24HR INTAKE/OUTPUT:      Intake/Output Summary (Last 24 hours) at 4/14/2019 1130  Last data filed at 4/14/2019 1026  Gross per 24 hour   Intake 960 ml   Output 1100 ml   Net -140 ml     Constitutional:  Alert, awake, no apparent distress  Eyes:  Pupils reactive, sclera clear   Neck:  Normal thyroid, no masses   Cardiovascular:  Regular, no rub  Respiratory:  No distress, no wheezing  Psychiatry:  Appropriate mood/affect, alert  Abdomen: +bs, soft, nt, no masses   Musculoskeletal: No LE edema, no clubbing   Lymphatics:  No LAD in neck, no supraclavicular nodes       MEDICAL DECISION MAKING       Data/  Recent Labs     04/12/19 0414 04/13/19 0429   WBC 14.1* 10.7   HGB 12.7* 11.4*   HCT 36.7* 32.0*   MCV 92.3 92.4    268     Recent Labs     04/12/19  0414 04/13/19  0429 04/14/19  0421   * 133* 131*   K 4.1 4.1 4.0   CL 92* 92* 92*   CO2 26 28 25   GLUCOSE 268* 135* 168*   PHOS 2.1*  --   --    MG 1.80  --   --    BUN 75* 82* 83*   CREATININE 5.6* 6.0* 5.6*   LABGLOM 11* 10* 11*   GFRAA 13* 12* 13*       Assessment/     ESRD:  On PD but intermittently failing UF.   Also the increase intra-abdominal pressure related to volume is not well tolerated by his pulmonary status. Had been on hemodialysis in the past but developed dialysis associated steal in the fistula so this was ligated. Labs are stable.      Shortness of breath:  CXR clear. Weight is up. Restrictive lung disease and ZAINAB. Also with increased intra-abdominal pressure with PD volume. Poor UF with hyperglycemia    Plan/     Discussed with IR. On schedule for Monday morning TDC and then will get dialysis after  NPO at midnight  Mag Citrate    Will need case management to work on outpatient dialysis spot under \"ESRD\".   He did go to [place] in the past  Long term we will try to get him to home HD  -----------------------------  Monica Humphries M.D.   [facility]

## 2019-04-14 NOTE — PROGRESS NOTES
Bedside report received from [facility]. Patient resting comfortably in bed watching television. Patient c/o \"terrible gas\", requesting something to help. Message sent to provider. No signs of discomfort or distress. Bed is in lowest position, wheels locked, 2/2 side rails up. Bedside table and call light within reach. White board updated. Will continue to monitor patient. Bailee Zaragoza RN    Shift assessment complete. (See findings in flowsheet). Morning med pass complete. (See MAR). VSS. PRN percocet given for pain. Patient resting in bed comfortably. Bed in lowest position, brakes locked. Nonskid footwear in place. 5 P's addressed, no needs at this time. Pt calls out appropriately. Will continue to monitor. Bailee Zaragoza RN    2:55 AM  PRN percocet given for pain. Bailee Zaragoza RN    6:27 AM  Consent signed for vasc cath placement, patient has been NPO, morning dose of heparin and PO meds held.  Bailee Zaragoza RN

## 2019-04-15 ENCOUNTER — APPOINTMENT (OUTPATIENT)
Dept: INTERVENTIONAL RADIOLOGY/VASCULAR | Age: 49
DRG: 189 | End: 2019-04-15
Payer: COMMERCIAL

## 2019-04-15 ENCOUNTER — APPOINTMENT (OUTPATIENT)
Dept: GENERAL RADIOLOGY | Age: 49
DRG: 189 | End: 2019-04-15
Payer: COMMERCIAL

## 2019-04-15 PROBLEM — F11.90 CHRONIC, CONTINUOUS USE OF OPIOIDS: Status: ACTIVE | Noted: 2019-04-15

## 2019-04-15 LAB
ALBUMIN SERPL-MCNC: 3.2 G/DL (ref 3.4–5)
ANION GAP SERPL CALCULATED.3IONS-SCNC: 12 MMOL/L (ref 3–16)
BODY FLUID CULTURE, STERILE: NORMAL
BUN BLDV-MCNC: 83 MG/DL (ref 7–20)
CALCIUM SERPL-MCNC: 8.9 MG/DL (ref 8.3–10.6)
CHLORIDE BLD-SCNC: 92 MMOL/L (ref 99–110)
CO2: 29 MMOL/L (ref 21–32)
CREAT SERPL-MCNC: 5.3 MG/DL (ref 0.9–1.3)
GFR AFRICAN AMERICAN: 14
GFR NON-AFRICAN AMERICAN: 11
GLUCOSE BLD-MCNC: 122 MG/DL (ref 70–99)
GLUCOSE BLD-MCNC: 148 MG/DL (ref 70–99)
GLUCOSE BLD-MCNC: 158 MG/DL (ref 70–99)
GLUCOSE BLD-MCNC: 228 MG/DL (ref 70–99)
GLUCOSE BLD-MCNC: 248 MG/DL (ref 70–99)
GRAM STAIN RESULT: NORMAL
HCT VFR BLD CALC: 31 % (ref 40.5–52.5)
HEMOGLOBIN: 11 G/DL (ref 13.5–17.5)
MCH RBC QN AUTO: 32.6 PG (ref 26–34)
MCHC RBC AUTO-ENTMCNC: 35.6 G/DL (ref 31–36)
MCV RBC AUTO: 91.6 FL (ref 80–100)
PDW BLD-RTO: 14.5 % (ref 12.4–15.4)
PERFORMED ON: ABNORMAL
PHOSPHORUS: 3.6 MG/DL (ref 2.5–4.9)
PLATELET # BLD: 281 K/UL (ref 135–450)
PMV BLD AUTO: 7.9 FL (ref 5–10.5)
POTASSIUM REFLEX MAGNESIUM: 4.1 MMOL/L (ref 3.5–5.1)
POTASSIUM SERPL-SCNC: 4.1 MMOL/L (ref 3.5–5.1)
RBC # BLD: 3.39 M/UL (ref 4.2–5.9)
SODIUM BLD-SCNC: 133 MMOL/L (ref 136–145)
WBC # BLD: 11.2 K/UL (ref 4–11)

## 2019-04-15 PROCEDURE — 71045 X-RAY EXAM CHEST 1 VIEW: CPT

## 2019-04-15 PROCEDURE — 76937 US GUIDE VASCULAR ACCESS: CPT

## 2019-04-15 PROCEDURE — 6360000002 HC RX W HCPCS: Performed by: RADIOLOGY

## 2019-04-15 PROCEDURE — 80069 RENAL FUNCTION PANEL: CPT

## 2019-04-15 PROCEDURE — 77001 FLUOROGUIDE FOR VEIN DEVICE: CPT

## 2019-04-15 PROCEDURE — 90935 HEMODIALYSIS ONE EVALUATION: CPT

## 2019-04-15 PROCEDURE — 36558 INSERT TUNNELED CV CATH: CPT

## 2019-04-15 PROCEDURE — 2580000003 HC RX 258: Performed by: INTERNAL MEDICINE

## 2019-04-15 PROCEDURE — 5A1D70Z PERFORMANCE OF URINARY FILTRATION, INTERMITTENT, LESS THAN 6 HOURS PER DAY: ICD-10-PCS | Performed by: INTERNAL MEDICINE

## 2019-04-15 PROCEDURE — 6360000002 HC RX W HCPCS: Performed by: INTERNAL MEDICINE

## 2019-04-15 PROCEDURE — 94660 CPAP INITIATION&MGMT: CPT

## 2019-04-15 PROCEDURE — C1881 DIALYSIS ACCESS SYSTEM: HCPCS

## 2019-04-15 PROCEDURE — 6370000000 HC RX 637 (ALT 250 FOR IP): Performed by: INTERNAL MEDICINE

## 2019-04-15 PROCEDURE — 85027 COMPLETE CBC AUTOMATED: CPT

## 2019-04-15 PROCEDURE — 94761 N-INVAS EAR/PLS OXIMETRY MLT: CPT

## 2019-04-15 PROCEDURE — 36415 COLL VENOUS BLD VENIPUNCTURE: CPT

## 2019-04-15 PROCEDURE — 02H633Z INSERTION OF INFUSION DEVICE INTO RIGHT ATRIUM, PERCUTANEOUS APPROACH: ICD-10-PCS | Performed by: INTERNAL MEDICINE

## 2019-04-15 PROCEDURE — 99233 SBSQ HOSP IP/OBS HIGH 50: CPT | Performed by: INTERNAL MEDICINE

## 2019-04-15 PROCEDURE — 94640 AIRWAY INHALATION TREATMENT: CPT

## 2019-04-15 PROCEDURE — 2700000000 HC OXYGEN THERAPY PER DAY

## 2019-04-15 PROCEDURE — 2060000000 HC ICU INTERMEDIATE R&B

## 2019-04-15 RX ORDER — MIDAZOLAM HYDROCHLORIDE 5 MG/ML
INJECTION INTRAMUSCULAR; INTRAVENOUS
Status: COMPLETED | OUTPATIENT
Start: 2019-04-15 | End: 2019-04-15

## 2019-04-15 RX ORDER — FENTANYL CITRATE 50 UG/ML
INJECTION, SOLUTION INTRAMUSCULAR; INTRAVENOUS
Status: COMPLETED | OUTPATIENT
Start: 2019-04-15 | End: 2019-04-15

## 2019-04-15 RX ORDER — HYDROMORPHONE HCL 110MG/55ML
0.5 PATIENT CONTROLLED ANALGESIA SYRINGE INTRAVENOUS EVERY 4 HOURS PRN
Status: DISCONTINUED | OUTPATIENT
Start: 2019-04-15 | End: 2019-04-16 | Stop reason: HOSPADM

## 2019-04-15 RX ORDER — HEPARIN SODIUM 1000 [USP'U]/ML
4200 INJECTION, SOLUTION INTRAVENOUS; SUBCUTANEOUS PRN
Status: DISCONTINUED | OUTPATIENT
Start: 2019-04-15 | End: 2019-04-16 | Stop reason: HOSPADM

## 2019-04-15 RX ORDER — HYDROMORPHONE HCL 110MG/55ML
1 PATIENT CONTROLLED ANALGESIA SYRINGE INTRAVENOUS EVERY 4 HOURS PRN
Status: DISCONTINUED | OUTPATIENT
Start: 2019-04-15 | End: 2019-04-16 | Stop reason: HOSPADM

## 2019-04-15 RX ADMIN — HEPARIN SODIUM 4200 UNITS: 1000 INJECTION, SOLUTION INTRAVENOUS; SUBCUTANEOUS at 16:31

## 2019-04-15 RX ADMIN — INSULIN LISPRO 2 UNITS: 100 INJECTION, SOLUTION INTRAVENOUS; SUBCUTANEOUS at 20:39

## 2019-04-15 RX ADMIN — INSULIN LISPRO 4 UNITS: 100 INJECTION, SOLUTION INTRAVENOUS; SUBCUTANEOUS at 18:06

## 2019-04-15 RX ADMIN — PRAVASTATIN SODIUM 80 MG: 80 TABLET ORAL at 20:38

## 2019-04-15 RX ADMIN — MIDAZOLAM HYDROCHLORIDE 2 MG: 5 INJECTION, SOLUTION INTRAMUSCULAR; INTRAVENOUS at 08:30

## 2019-04-15 RX ADMIN — MIDAZOLAM HYDROCHLORIDE 1 MG: 5 INJECTION, SOLUTION INTRAMUSCULAR; INTRAVENOUS at 08:45

## 2019-04-15 RX ADMIN — LOSARTAN POTASSIUM 50 MG: 25 TABLET, FILM COATED ORAL at 20:39

## 2019-04-15 RX ADMIN — OXYCODONE AND ACETAMINOPHEN 1 TABLET: 7.5; 325 TABLET ORAL at 09:30

## 2019-04-15 RX ADMIN — HYDROMORPHONE HYDROCHLORIDE 1 MG: 2 INJECTION INTRAMUSCULAR; INTRAVENOUS; SUBCUTANEOUS at 18:03

## 2019-04-15 RX ADMIN — ISOSORBIDE MONONITRATE 30 MG: 30 TABLET, EXTENDED RELEASE ORAL at 20:38

## 2019-04-15 RX ADMIN — Medication 10 ML: at 20:39

## 2019-04-15 RX ADMIN — Medication 2 PUFF: at 07:26

## 2019-04-15 RX ADMIN — FENTANYL CITRATE 50 MCG: 50 INJECTION INTRAMUSCULAR; INTRAVENOUS at 08:45

## 2019-04-15 RX ADMIN — CITALOPRAM HYDROBROMIDE 40 MG: 20 TABLET ORAL at 20:38

## 2019-04-15 RX ADMIN — Medication 2 PUFF: at 19:19

## 2019-04-15 RX ADMIN — OXYCODONE AND ACETAMINOPHEN 1 TABLET: 7.5; 325 TABLET ORAL at 15:30

## 2019-04-15 RX ADMIN — INSULIN LISPRO 2 UNITS: 100 INJECTION, SOLUTION INTRAVENOUS; SUBCUTANEOUS at 08:02

## 2019-04-15 RX ADMIN — INSULIN GLARGINE 10 UNITS: 100 INJECTION, SOLUTION SUBCUTANEOUS at 20:39

## 2019-04-15 RX ADMIN — OXYCODONE AND ACETAMINOPHEN 1 TABLET: 7.5; 325 TABLET ORAL at 02:54

## 2019-04-15 RX ADMIN — Medication 2 G: at 08:25

## 2019-04-15 RX ADMIN — OXYCODONE AND ACETAMINOPHEN 1 TABLET: 7.5; 325 TABLET ORAL at 21:37

## 2019-04-15 RX ADMIN — FENTANYL CITRATE 100 MCG: 50 INJECTION INTRAMUSCULAR; INTRAVENOUS at 08:30

## 2019-04-15 RX ADMIN — DILTIAZEM HYDROCHLORIDE 180 MG: 180 CAPSULE, COATED, EXTENDED RELEASE ORAL at 20:38

## 2019-04-15 RX ADMIN — HEPARIN SODIUM 5000 UNITS: 5000 INJECTION INTRAVENOUS; SUBCUTANEOUS at 20:39

## 2019-04-15 RX ADMIN — CARVEDILOL 12.5 MG: 6.25 TABLET, FILM COATED ORAL at 17:23

## 2019-04-15 ASSESSMENT — PAIN DESCRIPTION - PAIN TYPE
TYPE: CHRONIC PAIN
TYPE: ACUTE PAIN;CHRONIC PAIN;SURGICAL PAIN
TYPE: CHRONIC PAIN

## 2019-04-15 ASSESSMENT — PAIN SCALES - GENERAL
PAINLEVEL_OUTOF10: 5
PAINLEVEL_OUTOF10: 8
PAINLEVEL_OUTOF10: 10
PAINLEVEL_OUTOF10: 8
PAINLEVEL_OUTOF10: 10
PAINLEVEL_OUTOF10: 9
PAINLEVEL_OUTOF10: 10
PAINLEVEL_OUTOF10: 8

## 2019-04-15 ASSESSMENT — PAIN DESCRIPTION - DESCRIPTORS
DESCRIPTORS: ACHING
DESCRIPTORS: ACHING

## 2019-04-15 ASSESSMENT — PAIN DESCRIPTION - LOCATION
LOCATION: BACK
LOCATION: BACK

## 2019-04-15 ASSESSMENT — PAIN DESCRIPTION - ORIENTATION
ORIENTATION: LOWER
ORIENTATION: LOWER

## 2019-04-15 ASSESSMENT — PAIN DESCRIPTION - FREQUENCY
FREQUENCY: CONTINUOUS

## 2019-04-15 ASSESSMENT — PAIN DESCRIPTION - ONSET: ONSET: ON-GOING

## 2019-04-15 ASSESSMENT — PAIN DESCRIPTION - PROGRESSION: CLINICAL_PROGRESSION: NOT CHANGED

## 2019-04-15 NOTE — PROGRESS NOTES
(1.753 m)   Wt 224 lb 3.3 oz (101.7 kg)   SpO2 96%   BMI 33.11 kg/m²     General appearance: No apparent distress, appears stated age and cooperative. HEENT: Pupils equal, round, and reactive to light. Conjunctivae/corneas clear. Neck: Supple, with full range of motion. No jugular venous distention. Trachea midline. Respiratory:  Normal respiratory effort. Few rhonchi  Cardiovascular: Regular rate and rhythm with normal S1/S2 without murmurs, rubs or gallops. Abdomen: Soft, less distended today, non-tender  Musculoskeletal: No clubbing, cyanosis or edema bilaterally. Full range of motion without deformity. Skin: Skin color, texture, turgor normal.  No rashes or lesions. Neurologic:  Neurovascularly intact without any focal sensory/motor deficits. Cranial nerves: II-XII intact, grossly non-focal.  Psychiatric: Alert and oriented, thought content appropriate, normal insight  Capillary Refill: Brisk,< 3 seconds   Peripheral Pulses: +2 palpable, equal bilaterally     I examined the patient today (04/15/19). Physical exam is same as yesterday (4/13)    Labs:   Recent Labs     04/13/19  0429 04/15/19  0427   WBC 10.7 11.2*   HGB 11.4* 11.0*   HCT 32.0* 31.0*    281     Recent Labs     04/13/19  0429 04/14/19  0421 04/15/19  0427   * 131* 133*   K 4.1 4.0 4.1  4.1   CL 92* 92* 92*   CO2 28 25 29   BUN 82* 83* 83*   CREATININE 6.0* 5.6* 5.3*   CALCIUM 8.8 8.2* 8.9   PHOS  --   --  3.6     No results for input(s): AST, ALT, BILIDIR, BILITOT, ALKPHOS in the last 72 hours. Recent Labs     04/14/19  1211   INR 0.92     No results for input(s): Regan Gal in the last 72 hours.     Urinalysis:      Lab Results   Component Value Date    NITRU Negative 01/12/2018    WBCUA 0-2 01/12/2018    BACTERIA Rare 01/12/2018    RBCUA 3-5 01/12/2018    BLOODU Negative 01/12/2018    SPECGRAV 1.025 01/12/2018    GLUCOSEU 100 01/12/2018       Radiology:  XR CHEST PORTABLE   Final Result   Satisfactory position, left tunneled IJ catheter. No acute abnormality. IR TUNNELED CVC PLACE WO SQ PORT/PUMP > 5 YEARS   Preliminary Result   1. Unsuccessful attempt at placement of right internal jugular dialysis   catheter due to inability to advance the guidewire from the right internal   jugular vein into the SVC. 2. Status post successful ultrasound/fluoroscopically guided placement of   left internal jugular tunneled dialysis catheter as described above. XR CHEST PORTABLE   Final Result   No acute cardiopulmonary disease. NM LUNG VENT/PERFUSION (VQ)   Final Result   1. Low probability study for pulmonary embolism. 2. Findings suggestive of COPD. XR CHEST STANDARD (2 VW)   Final Result   No acute disease. Assessment/Plan:    Active Hospital Problems    Diagnosis Date Noted    Hypoxia [R09.02]      PLAN:      Acute hypoxemic respiratory failure  Started on HHN, steroids and oxygen supplementation  Improving  Smoking cessation advice given  Should improve with transition to HD    ESRD on PD  Nephrology consulted  Abdominal distention may also be related to PD. Abdomen is non-tender. Transitioning to hemodialysis  Tolerating hemodialysis well.     Chronic back pain  Continue prn Flexeril and Percocet     Hypertension   BP controlled  Continue home medications     DM Type 2 with hyperglycemia  Continue Lantus, add SSI        DVT Prophylaxis: heparin  Diet: DIET RENAL;  Code Status: Full Code    PT/OT Eval Status: pending    Dispo - inpatient until outpatient ESRD is arranged.  Unclear timing for now    Michael Rogers MD

## 2019-04-15 NOTE — PROGRESS NOTES
04/15/19 1923   NIV Type   NIV Started Yes   Equipment Type v60   Mode BIPAP   Mask Type Full face mask   Mask Size Medium   Settings/Measurements   Comfort Level Good   IPAP 12 cmH20   EPAP 6 cmH2O   Rate Ordered 8   Resp 17   SpO2 99   FiO2  30 %   Vt Exhaled 573 mL   Mask Leak (lpm) 43 lpm   Skin Protection for O2 Device No   Breath Sounds   Right Upper Lobe Diminished   Right Middle Lobe Diminished   Right Lower Lobe Diminished   Left Upper Lobe Diminished   Left Lower Lobe Diminished   Patient Observation   Observations mepilex refused by pt   Alarm Settings   Alarms On Y   Press Low Alarm 6 cmH2O   High Pressure Alarm 25 cmH2O   Delay Alarm 20 sec(s)   Apnea (secs) 20 secs   Resp Rate Low Alarm 6   High Respiratory Rate 40 br/min

## 2019-04-15 NOTE — PROGRESS NOTES
Pt called RN to room to report bleeding at vas cath site. Minimal bleeding from under biopatch observed. RN placed gauze and tape and informed pt to remain upright as much as possible. RN informed Teresita Dukes in [employer]. Pt addressed RN as Teena Senters" on multiple occasions. RN requested that pt use given name. Will continue to monitor.

## 2019-04-15 NOTE — CARE COORDINATION
Sw will need to fax HD flow sheets to [id number] when able. Pt can have an oupt spot at [facility]     M,W,F at 11:15am  Or  T,TH,Sat 11:00am     Sw will follow and will call [facility] directly at [phone]  if pt discharges as Ashy in intake is leaving for the day.

## 2019-04-15 NOTE — PROGRESS NOTES
INPATIENT PULMONARY CRITICAL CARE PROGRESS NOTE      Reason for visit    acute hypoxic resp failure        SUBJECTIVE:  Patient underwent Vas-Cath placement today, patient when seen was in HD, patient was crying in pain, patient says that the dialysis catheter site is hurting him along with that patient also says that he broke her tooth yesterday which is causing him to have severe pain and also patient says that because of this patient was also having increased shortness of breath, patient is not having any increased cough or expectoration, patient was not having any wheezing, patient did not want to give any other objective review of systems because of the pain, patient when seen was afebrile, patient was hemodynamically maintained, patient's blood sugars were not controlled, patient was on room air oxygen with saturation of 97% when seen, patient had normal sinus rhythm on the monitor, no other pertinent review of system of concern       Physical Exam:  Blood pressure 139/74, pulse 68, temperature 97.6 °F (36.4 °C), temperature source Oral, resp. rate 18, height 5' 9\" (1.753 m), weight 217 lb 2.5 oz (98.5 kg), SpO2 97 %.'   Constitutional:  No acute distress. in pain  HENT:  Oropharynx is clear and moist. No thyromegaly. Eyes:  Conjunctivae are normal. Pupils equal, round, and reactive to light. No scleral icterus. Neck: . No tracheal deviation present. No obvious thyroid mass. Shot enlarged neck  Cardiovascular: Normal rate, regular rhythm, normal heart sounds. No right ventricular heave. No lower extremity edema. Pulmonary/Chest: No wheezes. Bibasilar rales. Chest wall is not dull to percussion. No accessory muscle usage or stridor. East of breath 160  Abdominal: Soft. Bowel sounds present. No distension or hernia. No tenderness. Obese   Musculoskeletal: No cyanosis. No clubbing. No obvious joint deformity. Lymphadenopathy: No cervical or supraclavicular adenopathy. Skin: Skin is warm and dry. No rash or nodules on the exposed extremities. Psychiatric: Normal mood and affect. Behavior is normal.  No anxiety. Neurologic: Alert, awake and oriented. PERRL. Speech fluent        Results:  CBC:   Recent Labs     04/13/19  0429 04/15/19  0427   WBC 10.7 11.2*   HGB 11.4* 11.0*   HCT 32.0* 31.0*   MCV 92.4 91.6    281     BMP:   Recent Labs     04/13/19  0429 04/14/19  0421 04/15/19  0427   * 131* 133*   K 4.1 4.0 4.1  4.1   CL 92* 92* 92*   CO2 28 25 29   PHOS  --   --  3.6   BUN 82* 83* 83*   CREATININE 6.0* 5.6* 5.3*     LIVER PROFILE: No results for input(s): AST, ALT, LIPASE, BILIDIR, BILITOT, ALKPHOS in the last 72 hours. Invalid input(s): AMYLASE,  ALB  PT/INR:   Recent Labs     04/14/19  1211   PROTIME 10.5   INR 0.92       Imaging:  I have reviewed radiology images personally. XR CHEST PORTABLE   Final Result   Satisfactory position, left tunneled IJ catheter. No acute abnormality. IR TUNNELED CVC PLACE WO SQ PORT/PUMP > 5 YEARS   Preliminary Result   1. Unsuccessful attempt at placement of right internal jugular dialysis   catheter due to inability to advance the guidewire from the right internal   jugular vein into the SVC. 2. Status post successful ultrasound/fluoroscopically guided placement of   left internal jugular tunneled dialysis catheter as described above. XR CHEST PORTABLE   Final Result   No acute cardiopulmonary disease. NM LUNG VENT/PERFUSION (VQ)   Final Result   1. Low probability study for pulmonary embolism. 2. Findings suggestive of COPD. XR CHEST STANDARD (2 VW)   Final Result   No acute disease.            Xr Chest Standard (2 Vw)    Result Date: 4/12/2019  EXAMINATION: TWO VIEWS OF THE CHEST 4/12/2019 4:13 am COMPARISON: 12/24/2018 and CT from 12/10/2018 HISTORY: ORDERING SYSTEM PROVIDED HISTORY: CHF, SOB TECHNOLOGIST PROVIDED HISTORY: Reason for exam:->CHF, SOB Ordering Physician Provided Reason for Exam: SOB, COPD position, left tunneled IJ catheter. No acute abnormality. Xr Chest Portable    Result Date: 4/13/2019  EXAMINATION: SINGLE XRAY VIEW OF THE CHEST 4/13/2019 9:52 am COMPARISON: 04/12/2019. HISTORY: ORDERING SYSTEM PROVIDED HISTORY: hypoxia TECHNOLOGIST PROVIDED HISTORY: Reason for exam:->hypoxia Ordering Physician Provided Reason for Exam: hypoxia Acuity: Acute Type of Exam: Initial FINDINGS: The cardiomediastinal silhouette is unremarkable. Nodule at the left lung base is stable, consistent with a calcified nodule or pleural plaque on previous CT imaging. The lungs are otherwise clear with no infiltrate, pleural fluid or evidence of overt failure. No acute cardiopulmonary disease. Ir Tunneled Cvc Place Wo Sq Port/pump > 5 Years    Result Date: 4/15/2019  PROCEDURE: ULTRASOUND GUIDED VASCULAR ACCESS. FLUOROSCOPY GUIDED PLACEMENT OF A TUNNELED CATHETER. MODERATE CONSCIOUS SEDATION 4/15/2019. HISTORY: ORDERING SYSTEM PROVIDED HISTORY: Tunnel Dialysis Catheter. Patient failed PD, he is ESRD TECHNOLOGIST PROVIDED HISTORY: Reason for exam:->Tunnel Dialysis Catheter. Patient failed PD, he is ESRD SEDATION: Versed 2 mg IV and Fentanyl 100 mcg IV were administered. Following administration of the medications, patient was continuously monitored throughout the procedure by the Radiology Specials nurse with radiologist in attendance. Total monitoring time was approximately 35 minutes. FLUOROSCOPY DOSE AND TYPE OR TIME AND EXPOSURES: Fluoroscopic time: 1 minute 36 seconds. Number of fluoroscopic images obtained:  0. Fluoroscopic images that were obtained were generated using last image hold technique. PROCEDURE NOTE: DIALYSIS CATHETER: 14.5 [place] x 28 cm Palindrome Chronic Dual Lumen Catheter. ANTIBIOTICS: Ancef 2 grams IV. Informed consent was obtained prior to initiation of the procedure. Patient was placed on the fluoroscopic table in supine position. Time-out was performed.  Limited ultrasonographic was placed. Sheath dilator and guidewire were then removed. Distal tip of the dialysis catheter was placed through the peel-away sheath with distal tip advanced into the region of the right atrium under fluoroscopic guidance. Peel-away sheath was removed. Catheter ports flushed without difficulty. Following this, catheter ports were locked with heparin solution (1000 units/ml). Neck incision was closed using Dermabond. Proximal aspect of the catheter was transfixed to the patient's skin using two 2-0 silk sutures. Patient tolerated the procedure well. 1. Unsuccessful attempt at placement of right internal jugular dialysis catheter due to inability to advance the guidewire from the right internal jugular vein into the SVC. 2. Status post successful ultrasound/fluoroscopically guided placement of left internal jugular tunneled dialysis catheter as described above. Results for Tiffany Lutz (MRN [id number]) as of 4/15/2019 18:59   Ref.  Range 4/14/2019 04:21 4/14/2019 07:23 4/14/2019 11:21 4/14/2019 16:05 4/14/2019 20:21 4/15/2019 04:27 4/15/2019 04:27 4/15/2019 09:34   Sodium Latest Ref Range: 136 - 145 mmol/L 131 (L)      133 (L)    Potassium Latest Ref Range: 3.5 - 5.1 mmol/L 4.0     4.1 4.1    Chloride Latest Ref Range: 99 - 110 mmol/L 92 (L)      92 (L)    CO2 Latest Ref Range: 21 - 32 mmol/L 25      29    BUN Latest Ref Range: 7 - 20 mg/dL 83 (HH)      83 (HH)    Creatinine Latest Ref Range: 0.9 - 1.3 mg/dL 5.6 (HH)      5.3 (HH)    Anion Gap Latest Ref Range: 3 - 16  14      12    GFR Non- Latest Ref Range: >60  11 (A)      11 (A)    GFR  Latest Ref Range: >60  13 (A)      14 (A)    Glucose Latest Ref Range: 70 - 99 mg/dL 168 (H)      122 (H)    POC Glucose Latest Ref Range: 70 - 99 mg/dl  180 (H) 237 (H) 197 (H) 216 (H)   158 (H)   Calcium Latest Ref Range: 8.3 - 10.6 mg/dL 8.2 (L)      8.9    Phosphorus Latest Ref Range: 2.5 - 4.9 mg/dL      3.6     Albumin Latest Ref Range: 3.4 - 5.0 g/dL      3.2 (L)       Results for Iqra Bobo (MRN [id number]) as of 4/15/2019 18:59   Ref. Range 4/12/2019 04:14 4/13/2019 04:29 4/15/2019 04:27   WBC Latest Ref Range: 4.0 - 11.0 K/uL 14.1 (H) 10.7 11.2 (H)   RBC Latest Ref Range: 4.20 - 5.90 M/uL 3.97 (L) 3.47 (L) 3.39 (L)   Hemoglobin Quant Latest Ref Range: 13.5 - 17.5 g/dL 12.7 (L) 11.4 (L) 11.0 (L)   Hematocrit Latest Ref Range: 40.5 - 52.5 % 36.7 (L) 32.0 (L) 31.0 (L)   MCV Latest Ref Range: 80.0 - 100.0 fL 92.3 92.4 91.6   MCH Latest Ref Range: 26.0 - 34.0 pg 32.1 32.9 32.6   MCHC Latest Ref Range: 31.0 - 36.0 g/dL 34.8 35.6 35.6   MPV Latest Ref Range: 5.0 - 10.5 fL 7.6 7.7 7.9   RDW Latest Ref Range: 12.4 - 15.4 % 14.9 14.9 14.5   Platelet Count Latest Ref Range: 135 - 450 K/uL 325 268 281   Neutrophils % Latest Units: % 71.0       SINGLE XRAY VIEW OF THE CHEST       4/15/2019 9:52 am       COMPARISON:   None.       HISTORY:   ORDERING SYSTEM PROVIDED HISTORY: status post left tunneled HD catheter   placement   TECHNOLOGIST PROVIDED HISTORY:   Reason for exam:->status post left tunneled HD catheter placement       FINDINGS:   A left tunneled dialysis catheter is seen, tip extending to the upper 3rd of   the right atrium.  There is no pneumothorax.  There is no pleural effusion. The heart is upper normal size.  Lungs are clear.  Pulmonary vessels are   normal.           Impression   Satisfactory position, left tunneled IJ catheter.       No acute abnormality. PFT in 2016-Severe OAD with RAD     Assessment:  Active Problems:    Tobacco smoking    Chronic obstructive pulmonary disease (HCC)    Obesity (BMI 30-39.9)    ZAINAB on CPAP    Hypoxia    ESRD (end stage renal disease) on dialysis (HCC)    SOB (shortness of breath)    Chronic, continuous use of opioids  Resolved Problems:    * No resolved hospital problems.  *          Plan:   · Oxygen supplementation, if required, to keep saturation between 90 and 94% only  · Pulmonary toilet  · Noninvasive ventilation at nighttime/while taking naps in the daytime and on when necessary basis  · Patient is on chronic narcotic therapy and needs to monitor for any hypoventilation  · Bronchodilators  · No need of any antibiotics or prednisone from pulmonary standpoint of view at this time  · Hemodialysis as per nephrology  · Patient needs to stop smoking otherwise he will have increasing morbidity and mortality  and can be a respiratory cripple  · Cardiac medications as per IM  · Lantus insulin to be titrated as per blood glucose monitoring-may have to be increased as patient's blood sugars are not controlled-to be decided upon by IM team  · Blood glucose monitoring with slight scale insulin  · PUD and DVT prophylaxis    Case discussed with nursing and hemodialysis team             Electronically signed by:  Lisy Beth MD    4/15/2019    7:08 PM.

## 2019-04-15 NOTE — CARE COORDINATION
Sw notes order re: OP HDU for ESRD. Sw called and faxed information to [facility] to arrange. Pt has been active with [facility] in the past.   Sw will follow.

## 2019-04-15 NOTE — PROGRESS NOTES
Pt is not ready to wear BIPAP at this time, pt resting comfortably on RA. RT will continue to monitor.

## 2019-04-15 NOTE — PROGRESS NOTES
Progress Note    HISTORY     CC:  SOB, we are following for ESRD          Subjective/   HPI  Getting TDC on 4/15, feels fine    ROS:  Constitutional:  No fevers, No Chills, + weakness  Cardiovascular:  No palpations, + edema  Respiratory:  No wheezing, no cough  Skin:  No rash, no itching  :  No hematuria, no dysuria     Social Hx:  No family at bedside     Past Medical and Surgical History:  - Reviewed, no changes     EXAM       Objective/     Vitals:    04/14/19 2330 04/15/19 0029 04/15/19 0615 04/15/19 0726   BP: 125/75  125/83    Pulse: 73  72    Resp: 16 14 16 16   Temp: 98.8 °F (37.1 °C)  98.9 °F (37.2 °C)    TempSrc: Oral  Oral    SpO2:   97% 97%   Weight:       Height:         24HR INTAKE/OUTPUT:      Intake/Output Summary (Last 24 hours) at 4/15/2019 0749  Last data filed at 4/15/2019 0115  Gross per 24 hour   Intake 600 ml   Output 1100 ml   Net -500 ml     Constitutional:  Alert, awake, no apparent distress  Eyes:  Pupils reactive, sclera clear   Neck:  Normal thyroid, no masses   Cardiovascular:  Regular, no rub  Respiratory:  No distress, no wheezing  Psychiatry:  Appropriate mood/affect, alert  Abdomen: +bs, soft, nt, no masses   Musculoskeletal: No LE edema, no clubbing   Lymphatics:  No LAD in neck, no supraclavicular nodes       MEDICAL DECISION MAKING       Data/  Recent Labs     04/13/19  0429 04/15/19  0427   WBC 10.7 11.2*   HGB 11.4* 11.0*   HCT 32.0* 31.0*   MCV 92.4 91.6    281     Recent Labs     04/13/19  0429 04/14/19  0421 04/15/19  0427   * 131* 133*   K 4.1 4.0 4.1  4.1   CL 92* 92* 92*   CO2 28 25 29   GLUCOSE 135* 168* 122*   PHOS  --   --  3.6   BUN 82* 83* 83*   CREATININE 6.0* 5.6* 5.3*   LABGLOM 10* 11* 11*   GFRAA 12* 13* 14*       Assessment/     ESRD:  On PD but intermittently failing UF. Also the increase intra-abdominal pressure related to volume is not well tolerated by his pulmonary status.    Had been on hemodialysis in the past but developed dialysis associated steal in the fistula so this was ligated. Labs are stable.    -tdc on 4/15/19 with initiation of HD      Shortness of breath:  CXR clear. Weight is up. Restrictive lung disease and ZAINAB. Also with increased intra-abdominal pressure with PD volume. Poor UF with hyperglycemia    Plan/     -HD from 4/15/19 as ordered w 2-4 l as tolerated   - consulted case management to work on outpatient dialysis spot under \"ESRD\".   He did go to [place] in the past  -Long term we will try to get him to home HD    -----------------------------  Nicolás Rubio MD  [facility]  Office: [phone]  Fax:    [phone]

## 2019-04-15 NOTE — PROGRESS NOTES
Hemodialysis Intake (ml)  --  400 ml   Hemodialysis Output (ml)  --  3400 ml   NET Removed (ml)  --  3000 ml   Tolerated Treatment  --  Good   Bilateral Breath Sounds Diminished Clear;Diminished   Edema None None

## 2019-04-16 ENCOUNTER — TELEPHONE (OUTPATIENT)
Dept: PULMONOLOGY | Age: 49
End: 2019-04-16

## 2019-04-16 VITALS
SYSTOLIC BLOOD PRESSURE: 154 MMHG | OXYGEN SATURATION: 97 % | RESPIRATION RATE: 18 BRPM | BODY MASS INDEX: 32.81 KG/M2 | TEMPERATURE: 97.6 F | HEART RATE: 77 BPM | WEIGHT: 221.5 LBS | HEIGHT: 69 IN | DIASTOLIC BLOOD PRESSURE: 90 MMHG

## 2019-04-16 LAB
GLUCOSE BLD-MCNC: 139 MG/DL (ref 70–99)
PERFORMED ON: ABNORMAL

## 2019-04-16 PROCEDURE — 6360000002 HC RX W HCPCS: Performed by: INTERNAL MEDICINE

## 2019-04-16 PROCEDURE — 6370000000 HC RX 637 (ALT 250 FOR IP): Performed by: INTERNAL MEDICINE

## 2019-04-16 PROCEDURE — 94640 AIRWAY INHALATION TREATMENT: CPT

## 2019-04-16 PROCEDURE — 99232 SBSQ HOSP IP/OBS MODERATE 35: CPT | Performed by: INTERNAL MEDICINE

## 2019-04-16 PROCEDURE — 2580000003 HC RX 258: Performed by: INTERNAL MEDICINE

## 2019-04-16 RX ADMIN — OXYCODONE AND ACETAMINOPHEN 1 TABLET: 7.5; 325 TABLET ORAL at 11:12

## 2019-04-16 RX ADMIN — HEPARIN SODIUM 5000 UNITS: 5000 INJECTION INTRAVENOUS; SUBCUTANEOUS at 05:11

## 2019-04-16 RX ADMIN — Medication 2 PUFF: at 07:46

## 2019-04-16 RX ADMIN — HYDROMORPHONE HYDROCHLORIDE 1 MG: 2 INJECTION INTRAMUSCULAR; INTRAVENOUS; SUBCUTANEOUS at 12:18

## 2019-04-16 RX ADMIN — TORSEMIDE 100 MG: 100 TABLET ORAL at 09:09

## 2019-04-16 RX ADMIN — Medication 10 ML: at 09:46

## 2019-04-16 RX ADMIN — HYDROMORPHONE HYDROCHLORIDE 1 MG: 2 INJECTION INTRAMUSCULAR; INTRAVENOUS; SUBCUTANEOUS at 07:17

## 2019-04-16 RX ADMIN — HYDROMORPHONE HYDROCHLORIDE 1 MG: 2 INJECTION INTRAMUSCULAR; INTRAVENOUS; SUBCUTANEOUS at 01:00

## 2019-04-16 RX ADMIN — Medication 2 PUFF: at 07:43

## 2019-04-16 RX ADMIN — PANTOPRAZOLE SODIUM 40 MG: 40 TABLET, DELAYED RELEASE ORAL at 05:11

## 2019-04-16 RX ADMIN — CARVEDILOL 12.5 MG: 6.25 TABLET, FILM COATED ORAL at 09:09

## 2019-04-16 RX ADMIN — OXYCODONE AND ACETAMINOPHEN 1 TABLET: 7.5; 325 TABLET ORAL at 05:11

## 2019-04-16 ASSESSMENT — PAIN SCALES - GENERAL
PAINLEVEL_OUTOF10: 8
PAINLEVEL_OUTOF10: 10
PAINLEVEL_OUTOF10: 9
PAINLEVEL_OUTOF10: 10
PAINLEVEL_OUTOF10: 9

## 2019-04-16 NOTE — TELEPHONE ENCOUNTER
----- Message from Rodrigue Harvey MD sent at 4/16/2019 10:24 AM EDT -----  Follow with Dr Bere Palafox in 3-4 weeks  Patient needs to bring his CPAP machine along

## 2019-04-16 NOTE — PROGRESS NOTES
INPATIENT PULMONARY CRITICAL CARE PROGRESS NOTE      Reason for visit    acute hypoxic resp failure        SUBJECTIVE:  Patient when seen this morning was feeling better, patient was not having that much of pain in the last Site, patient still had dental pain, patient does not have any increasing  shortness of breath, patient is not having any increased cough or expectoration, patient was not having any wheezing, patient did not want to give any other objective review of systems because of the pain, patient when seen was afebrile, patient was hemodynamically maintained, patient's blood sugars were  controlled, patient was on room air oxygen with saturation of 97% when seen, patient had normal sinus rhythm on the monitor, patient has a home CPAP machine which was given to him recently and he has not used it at home so far, no other pertinent review of system of concern       Physical Exam:  Blood pressure (!) 155/80, pulse 68, temperature 98.4 °F (36.9 °C), temperature source Oral, resp. rate 16, height 5' 9\" (1.753 m), weight 221 lb 8 oz (100.5 kg), SpO2 97 %.'   Constitutional:  No acute distress. in pain  HENT:  Oropharynx is clear and moist. No thyromegaly. Eyes:  Conjunctivae are normal. Pupils equal, round, and reactive to light. No scleral icterus. Neck: . No tracheal deviation present. No obvious thyroid mass. Shot enlarged neck  Cardiovascular: Normal rate, regular rhythm, normal heart sounds. No right ventricular heave. No lower extremity edema. Pulmonary/Chest: No wheezes. Minimal Bibasilar rales. Chest wall is not dull to percussion. No accessory muscle usage or stridor. East of breath 160  Abdominal: Soft. Bowel sounds present. No distension or hernia. No tenderness. Obese   Musculoskeletal: No cyanosis. No clubbing. No obvious joint deformity. Lymphadenopathy: No cervical or supraclavicular adenopathy. Skin: Skin is warm and dry.  No rash or nodules on the exposed ESRD (end stage renal disease) on dialysis [facility])  Active Problems:    Tobacco smoking    Chronic obstructive pulmonary disease (HCC)    Obesity (BMI 30-39.9)    ZAINAB on CPAP    Hypoxia    SOB (shortness of breath)    Chronic, continuous use of opioids  Resolved Problems:    * No resolved hospital problems. *          Plan:   · Oxygen supplementation, if required, to keep saturation between 90 and 94% only-patient is on RA  · Pulmonary toilet  · Noninvasive ventilation at nighttime/while taking naps in the daytime and on when necessary basis-needs to be compliant about using his CPAP machine at home   · Patient is on chronic narcotic therapy and needs to monitor for any hypoventilation  · Bronchodilators  · No need of any antibiotics or prednisone from pulmonary standpoint of view at this time  · Hemodialysis as per nephrology-Case management has already arranged for the O/P HD   · PD catheter may be removed today   · Patient needs to stop smoking otherwise he will have increasing morbidity and mortality  and can be a respiratory cripple  · Cardiac medications as per IM  · Insulins as per IM   · Blood glucose monitoring with slight scale insulin  · PUD and DVT prophylaxis    Case discussed with Case management and Dr Jean Presumparker    ? Discharge planning    If discharged,patient to follow Dr Heidi Lozano in 3-4 weeks with the CPAP machine for review of efficacy and compliance       Electronically signed by:  Dorothy Macias MD    4/16/2019    10:19 AM.

## 2019-04-16 NOTE — CARE COORDINATION
Adamaris pt is all set for OP HD. Pt will be on the MWF at 11:15 schedule. Pt is all set up to start tomorrow Wednesday the 17th for his first run. Pt to be at the HD location at 10:30 tomorrow.  Pt expressed interest in [facility] per his request a referral was made to [facility] as he has been active with them in the past.

## 2019-04-16 NOTE — DISCHARGE SUMMARY
Hospital Medicine Discharge Summary    Patient ID: Jude Lubin      Patient's PCP: Nesha Oseguera MD    Admit Date: 4/12/2019     Discharge Date:   4/16/19    Admitting Physician: Moriah Duke MD     Discharge Physician: Cora Cordoba MD     Discharge Diagnoses: Active Hospital Problems    Diagnosis Date Noted    ESRD (end stage renal disease) on dialysis ([facility] [N18.6, Z99.2] 11/22/2017     Priority: High    Tobacco smoking [Z72.0]      Priority: High    Chronic obstructive pulmonary disease ([facility] [address] [J44.9] 05/14/2013     Priority: Medium    Chronic, continuous use of opioids [F11.90] 04/15/2019    SOB (shortness of breath) [R06.02] 12/24/2018    Hypoxia [R09.02]     ZAINAB on CPAP [G47.33, Z99.89] 02/11/2016    Obesity (BMI 30-39. 9) [E66.9]        The patient was seen and examined on day of discharge and this discharge summary is in conjunction with any daily progress note from day of discharge. Hospital Course:     Acute hypoxemic respiratory failure  Started on HHN, steroids and oxygen supplementation  Improving  Smoking cessation advice given  Should improve with transition to HD     ESRD on PD  Nephrology consulted  Abdominal distention may also be related to PD. Abdomen is non-tender. Transitioning to hemodialysis  Tolerating hemodialysis well.     Chronic back pain  Continue prn Flexeril and Percocet     Hypertension   BP controlled  Continue home medications     DM Type 2 with hyperglycemia  Continue Lantus, add SSI    Plan for dc PD catheter as outpatient. Physical Exam Performed:     BP (!) 155/80   Pulse 68   Temp 98.4 °F (36.9 °C) (Oral)   Resp 16   Ht 5' 9\" (1.753 m)   Wt 221 lb 8 oz (100.5 kg)   SpO2 97%   BMI 32.71 kg/m²       General appearance:  No apparent distress, appears stated age and cooperative. HEENT:  Normal cephalic, atraumatic without obvious deformity. Pupils equal, round, and reactive to light. Extra ocular muscles intact. tongue every 5 minutes as needed for Chest pain up to max of 3 total doses. If no relief after 1 dose, call 911. Qty: 25 tablet, Refills: 3    Associated Diagnoses: Coronary artery disease involving native heart without angina pectoris, unspecified vessel or lesion type      traZODone (DESYREL) 150 MG tablet TAKE 1 TABLET EVERY NIGHT  Qty: 90 tablet, Refills: 1      CARTIA  MG extended release capsule TAKE 1 CAPSULE EVERY DAY  Qty: 90 capsule, Refills: 1      losartan (COZAAR) 50 MG tablet Take 1 tablet by mouth daily  Qty: 30 tablet, Refills: 3      carvedilol (COREG) 12.5 MG tablet Take 1 tablet by mouth 2 times daily (with meals)  Qty: 60 tablet, Refills: 3      citalopram (CELEXA) 40 MG tablet TAKE 1 TABLET EVERY DAY  Qty: 90 tablet, Refills: 1    Associated Diagnoses: Depression, unspecified depression type      insulin glargine (LANTUS) 100 UNIT/ML injection vial Inject 10 Units into the skin nightly  Qty: 3 vial, Refills: 3      Insulin Syringe-Needle U-100 30G X 1/2\" 0.5 ML MISC 1 each by Does not apply route daily  Qty: 100 each, Refills: 3      oxyCODONE-acetaminophen (PERCOCET) 7.5-325 MG per tablet Take 1 tablet by mouth every 4 hours as needed for Pain. .      Pediatric Multivitamins-Fl (MULTI VIT/FL) 0.25 MG CHEW       Polyethylene Glycol 3350 GRAN       hydrOXYzine (VISTARIL) 50 MG capsule TAKE 1 TO 2 CAPSULES EVERY NIGHT  Qty: 180 capsule, Refills: 0      !! Glucose Blood (BLOOD GLUCOSE TEST STRIPS) STRP TEST 3-4 TIMES DAILY, AS DIRECTED  Qty: 100 strip, Refills: 3      !! ACCU-CHEK FASTCLIX LANCETS MISC TEST 3-4 TIMES DAILY, AS DIRECTED  Qty: 300 each, Refills: 3      Blood Glucose Monitoring Suppl ADAM USE AS DIRECTED. Qty: 1 Device, Refills: 0    Comments: WITH CONTROL SOLUTION.       Alcohol Swabs PADS USE AS DIRECTED  Qty: 300 each, Refills: 3      B Complex-C-Folic Acid (VIRT-CAPS) 1 MG CAPS TK ONE C PO  QD  Refills: 3      albuterol sulfate  (90 Base) MCG/ACT inhaler Inhale 2 puffs into the lungs every 6 hours as needed for Wheezing  Qty: 1 Inhaler, Refills: 3      ipratropium-albuterol (DUONEB) 0.5-2.5 (3) MG/3ML SOLN nebulizer solution Inhale 3 mLs into the lungs every 6 hours as needed for Shortness of Breath  Qty: 360 mL, Refills: 1      !! glucose blood VI test strips (FREESTYLE LITE) strip Daily As needed. Qty: 100 strip, Refills: 3      glucose monitoring kit (FREESTYLE) monitoring kit 1 kit by Does not apply route daily  Qty: 1 kit, Refills: 0      !! Lancets MISC Test daily  Qty: 100 each, Refills: 3      calcium carbonate (TUMS) 500 MG chewable tablet Take 1 tablet by mouth 3 times daily as needed for Heartburn. aspirin 81 MG chewable tablet Take 1 tablet by mouth daily. Qty: 30 tablet, Refills: 2       !! - Potential duplicate medications found. Please discuss with provider. Time Spent on discharge is more than 30 minutes in the examination, evaluation, counseling and review of medications and discharge plan. Signed:    Lorelei Krishnamurthy MD   4/16/2019      Thank you Bola Barry MD for the opportunity to be involved in this patient's care. If you have any questions or concerns please feel free to contact me at [phone].

## 2019-04-16 NOTE — CARE COORDINATION
[facility]    Referral received from ELVIA Kramer RN CM with request for [address] services. Pt was doing PD and will be doing HD upon DC- on a MWF schedule. Per notes pt's chair time is 1115 am at [facility]. Pt is known to [facility]. Writer has reviewed H&P, most recent MD progress note, Nephrology and Pulnmology note. Pt was admitted for dx of acute respiratory failure. Pt had a vac cath placed 4/15, to start HD. Writer has also noted the pt has a hx of COPD. Will assess if pt is appropriate for/ agreeable to [place] [id number] Program for Management of COPD. I will continue to follow patient for needs, and arrange [address] services prior to DC. Should pt dc over the weekend please fax facesheet, order, CELINE, and H&P to [facility]. Update 10:30- Spoke with pt at bedside about [facility], all questions answered. Pt states he is very familiar with home care, as he has had it in the past. Pt is agreeable to SN visits, pt was not seen by PT/OT at [facility]. Pt states he has the following DME at home: walker and shower chair. Pt is aware of his new HD schedule. Writer discussed HCV's for Management of COPD, pt states he is agreeable and will use his smart phone. Pt aware agency will call to set up [facility] by 5p or tomorrow mid-morning/afternoon. Pt is agreeable to having a SOC within 2 days of DC. Demographics verified, pt lives in a mobile home with his wife, mobile home has a ramp. Orders faxed to [facility].   Doreen Bobo RN CTN with [address]  SNJ:[phone]

## 2019-04-16 NOTE — CARE COORDINATION
Case Management  Discharge Summary      [address] EQUIPMENT ORDERED: NEW OP HD at [facility] MWF     TRANSPORTATION: Family     ORDERS FAXED TO:  D/C Faxed and called to [facility] and [address]: N-A    PRE-CERTIFICATION OBTAINED: N-A    NURSE RESPONSIBLE FOR COMPLETION OF PAGE ONE OF CONTINUITY OF CARE (CELINE) FORM, RECONCILIATION OF MEDICATIONS, AND TO PLACE A COPY OF FORMS IN PATIENT'S HARD CHART. NURSE TO ENSURE THAT ANY WRITTEN PRESCRIPTIONS ARE GIVEN TO PT UPON DISCHARGE. Patient aware of and agreeable to all arrangements and states no further discharge planning needs.

## 2019-04-17 ENCOUNTER — OFFICE VISIT (OUTPATIENT)
Dept: FAMILY MEDICINE CLINIC | Age: 49
End: 2019-04-17
Payer: COMMERCIAL

## 2019-04-17 VITALS
WEIGHT: 225 LBS | DIASTOLIC BLOOD PRESSURE: 68 MMHG | HEART RATE: 84 BPM | OXYGEN SATURATION: 98 % | SYSTOLIC BLOOD PRESSURE: 164 MMHG | BODY MASS INDEX: 33.23 KG/M2

## 2019-04-17 DIAGNOSIS — G89.4 CHRONIC PAIN SYNDROME: ICD-10-CM

## 2019-04-17 DIAGNOSIS — Z76.82 KIDNEY TRANSPLANT CANDIDATE: ICD-10-CM

## 2019-04-17 DIAGNOSIS — K04.7 TOOTH ABSCESS: Primary | ICD-10-CM

## 2019-04-17 DIAGNOSIS — F41.9 ANXIETY: ICD-10-CM

## 2019-04-17 LAB
BLOOD CULTURE, ROUTINE: NORMAL
CULTURE, BLOOD 2: NORMAL

## 2019-04-17 PROCEDURE — 99214 OFFICE O/P EST MOD 30 MIN: CPT | Performed by: FAMILY MEDICINE

## 2019-04-17 PROCEDURE — G8598 ASA/ANTIPLAT THER USED: HCPCS | Performed by: FAMILY MEDICINE

## 2019-04-17 PROCEDURE — 4004F PT TOBACCO SCREEN RCVD TLK: CPT | Performed by: FAMILY MEDICINE

## 2019-04-17 PROCEDURE — 3017F COLORECTAL CA SCREEN DOC REV: CPT | Performed by: FAMILY MEDICINE

## 2019-04-17 PROCEDURE — G8427 DOCREV CUR MEDS BY ELIG CLIN: HCPCS | Performed by: FAMILY MEDICINE

## 2019-04-17 PROCEDURE — 1111F DSCHRG MED/CURRENT MED MERGE: CPT | Performed by: FAMILY MEDICINE

## 2019-04-17 PROCEDURE — G8417 CALC BMI ABV UP PARAM F/U: HCPCS | Performed by: FAMILY MEDICINE

## 2019-04-17 RX ORDER — GABAPENTIN 100 MG/1
100-200 CAPSULE ORAL 3 TIMES DAILY
Qty: 180 CAPSULE | Refills: 0 | Status: SHIPPED | OUTPATIENT
Start: 2019-04-17 | End: 2019-05-30 | Stop reason: SDUPTHER

## 2019-04-17 RX ORDER — BUPROPION HYDROCHLORIDE 150 MG/1
150 TABLET ORAL EVERY MORNING
Qty: 90 TABLET | Refills: 3 | Status: ON HOLD | OUTPATIENT
Start: 2019-04-17 | End: 2019-10-23 | Stop reason: HOSPADM

## 2019-04-17 RX ORDER — BUPROPION HYDROCHLORIDE 150 MG/1
150 TABLET ORAL EVERY MORNING
Qty: 30 TABLET | Refills: 3 | Status: SHIPPED | OUTPATIENT
Start: 2019-04-17 | End: 2019-04-17 | Stop reason: SDUPTHER

## 2019-04-17 RX ORDER — AMOXICILLIN AND CLAVULANATE POTASSIUM 875; 125 MG/1; MG/1
1 TABLET, FILM COATED ORAL 2 TIMES DAILY
Qty: 20 TABLET | Refills: 0 | Status: SHIPPED | OUTPATIENT
Start: 2019-04-17 | End: 2019-04-27

## 2019-04-19 ENCOUNTER — HOSPITAL ENCOUNTER (OUTPATIENT)
Dept: VASCULAR LAB | Age: 49
Discharge: HOME | End: 2019-04-19
Payer: COMMERCIAL

## 2019-04-19 ENCOUNTER — HOSPITAL ENCOUNTER (OUTPATIENT)
Dept: CARDIOLOGY | Age: 49
Discharge: HOME | End: 2019-04-19
Payer: COMMERCIAL

## 2019-04-19 DIAGNOSIS — I73.9 CLAUDICATION (HCC): ICD-10-CM

## 2019-04-19 DIAGNOSIS — R07.2 PRECORDIAL PAIN: ICD-10-CM

## 2019-04-19 DIAGNOSIS — R09.89 BILATERAL CAROTID BRUITS: ICD-10-CM

## 2019-04-19 LAB
LV EF: 43 %
LVEF MODALITY: NORMAL

## 2019-04-19 PROCEDURE — 3430000000 HC RX DIAGNOSTIC RADIOPHARMACEUTICAL: Performed by: INTERNAL MEDICINE

## 2019-04-19 PROCEDURE — A9502 TC99M TETROFOSMIN: HCPCS | Performed by: INTERNAL MEDICINE

## 2019-04-19 PROCEDURE — 6360000002 HC RX W HCPCS: Performed by: INTERNAL MEDICINE

## 2019-04-19 PROCEDURE — 93880 EXTRACRANIAL BILAT STUDY: CPT

## 2019-04-19 PROCEDURE — 93017 CV STRESS TEST TRACING ONLY: CPT

## 2019-04-19 PROCEDURE — 93925 LOWER EXTREMITY STUDY: CPT

## 2019-04-19 PROCEDURE — 78452 HT MUSCLE IMAGE SPECT MULT: CPT

## 2019-04-19 RX ADMIN — TETROFOSMIN 10.9 MILLICURIE: 0.23 INJECTION, POWDER, LYOPHILIZED, FOR SOLUTION INTRAVENOUS at 12:45

## 2019-04-19 RX ADMIN — REGADENOSON 0.4 MG: 0.08 INJECTION, SOLUTION INTRAVENOUS at 14:00

## 2019-04-22 ENCOUNTER — TELEPHONE (OUTPATIENT)
Dept: FAMILY MEDICINE CLINIC | Age: 49
End: 2019-04-22

## 2019-04-22 ENCOUNTER — TELEPHONE (OUTPATIENT)
Dept: CARDIOLOGY CLINIC | Age: 49
End: 2019-04-22

## 2019-04-22 DIAGNOSIS — R93.6 ABNORMAL ULTRASOUND OF LOWER EXTREMITY: Primary | ICD-10-CM

## 2019-04-22 NOTE — TELEPHONE ENCOUNTER
Test suggests blockage leg arteries   Refer to [place] with Shonda Vences, I relayed [facility] message regarding stress test and US of the legs. Pt verbalized understanding and is awaiting call from Dr. Hallie Calhoun office to schedule. I placed referral in [facility].

## 2019-04-22 NOTE — TELEPHONE ENCOUNTER
----- Message from Mariela Mack MD sent at 4/22/2019 12:14 PM EDT -----  Call  Stress test shows heart fxn mild reduced.  Similar to in past  No new blockage    No cardiac contraindication to kidney transplant

## 2019-04-28 NOTE — PROGRESS NOTES
APRN - CNP   metaxalone (SKELAXIN) 800 MG tablet TK ONE T PO Q 8 H PRN Yes Historical Provider, MD   traZODone (DESYREL) 150 MG tablet TAKE 1 TABLET EVERY NIGHT Yes JAY Motta CNP   CARTIA  MG extended release capsule TAKE 1 CAPSULE EVERY DAY Yes JAY Ling CNP   losartan (COZAAR) 50 MG tablet Take 1 tablet by mouth daily Yes Ava Serrano MD   carvedilol (COREG) 12.5 MG tablet Take 1 tablet by mouth 2 times daily (with meals) Yes Ava Serrano MD   citalopram (CELEXA) 40 MG tablet TAKE 1 TABLET EVERY DAY Yes JAY Ling CNP   insulin glargine (LANTUS) 100 UNIT/ML injection vial Inject 10 Units into the skin nightly  Patient taking differently: Inject 10-15 Units into the skin nightly  Yes Sp Bond MD   Insulin Syringe-Needle U-100 30G X 1/2\" 0.5 ML MISC 1 each by Does not apply route daily Yes Sp Bond MD   oxyCODONE-acetaminophen (PERCOCET) 7.5-325 MG per tablet Take 1 tablet by mouth every 4 hours as needed for Pain. . Yes Historical Provider, MD   Pediatric Multivitamins-Fl (MULTI VIT/FL) 0.25 MG CHEW  Yes Historical Provider, MD   Polyethylene Glycol 3350 GRAN  Yes Historical Provider, MD   hydrOXYzine (VISTARIL) 50 MG capsule TAKE 1 TO 2 CAPSULES EVERY NIGHT Yes Edith Jones MD   Glucose Blood (BLOOD GLUCOSE TEST STRIPS) STRP TEST 3-4 TIMES DAILY, AS DIRECTED Yes Edith Jones MD   ACCU-CHEK FASTCLIX LANCETS MISC TEST 3-4 TIMES DAILY, AS DIRECTED Yes Edith Jones MD   Blood Glucose Monitoring Suppl ADAM USE AS DIRECTED.  Yes Edith Jones MD   Alcohol Swabs PADS USE AS DIRECTED Yes Edith Jones MD   B Complex-C-Folic Acid (VIRT-CAPS) 1 MG CAPS TK ONE C PO  QD Yes Historical Provider, MD   albuterol sulfate  (90 Base) MCG/ACT inhaler Inhale 2 puffs into the lungs every 6 hours as needed for Wheezing Yes Shaista Berman MD   ipratropium-albuterol (DUONEB) 0.5-2.5 (3) MG/3ML SOLN nebulizer solution Inhale 3 mLs into the lungs every 6 hours as needed for Shortness of Breath Yes Hung Florez MD   glucose blood VI test strips (FREESTYLE LITE) strip Daily As needed. Yes David Messina MD   glucose monitoring kit (FREESTYLE) monitoring kit 1 kit by Does not apply route daily Yes David Messina MD   Lancets MISC Test daily Yes David Messina MD   calcium carbonate (TUMS) 500 MG chewable tablet Take 1 tablet by mouth 3 times daily as needed for Heartburn. Yes Historical Provider, MD   aspirin 81 MG chewable tablet Take 1 tablet by mouth daily. Yes Jordon Toledo MD   LINZESS 145 MCG capsule TAKE 1 CAPSULE BY MOUTH EVERY MORNING BEFORE BREAKFAST  Tawanna Yeager MD   buPROPion (WELLBUTRIN XL) 150 MG extended release tablet TAKE 1 TABLET BY MOUTH EVERY MORNING  Tawanna Yeager MD   albuterol (PROVENTIL) (2.5 MG/3ML) 0.083% nebulizer solution Take 3 mLs by nebulization every 6 hours as needed for Wheezing  Juan Antonio Wang MD   nitroGLYCERIN (NITROSTAT) 0.4 MG SL tablet Place 1 tablet under the tongue every 5 minutes as needed for Chest pain up to max of 3 total doses. If no relief after 1 dose, call 911. Tawanna Yeager MD        Social History     Tobacco Use    Smoking status: Current Every Day Smoker     Packs/day: 0.50     Years: 33.00     Pack years: 16.50     Types: Cigarettes    Smokeless tobacco: Never Used    Tobacco comment: TRYING TO QUIT 3-7 a day   Substance Use Topics    Alcohol use: No     Alcohol/week: 0.0 oz        Vitals:    04/17/19 0807   BP: (!) 164/68   Pulse: 84   SpO2: 98%   Weight: 225 lb (102.1 kg)     Estimated body mass index is 33.23 kg/m² as calculated from the following:    Height as of 4/12/19: 5' 9\" (1.753 m). Weight as of this encounter: 225 lb (102.1 kg). Physical Exam   Constitutional: He is oriented to person, place, and time. He appears well-developed and well-nourished. HENT:   Head: Normocephalic and atraumatic.    Right Ear: External ear normal.   Left Ear: External ear normal.   Nose: Nose normal.   Mouth/Throat: Oropharynx is clear and moist.   Swelling along upper gumline. Eyes: Pupils are equal, round, and reactive to light. Conjunctivae and EOM are normal.   Neck: Normal range of motion. Neck supple. Cardiovascular: Normal rate, regular rhythm, normal heart sounds and intact distal pulses. Exam reveals no gallop and no friction rub. No murmur heard. Pulmonary/Chest: Effort normal and breath sounds normal. No respiratory distress. He has no wheezes. Abdominal: Soft. Bowel sounds are normal. He exhibits no distension. There is no tenderness. Musculoskeletal: Normal range of motion. He exhibits no edema or tenderness. Neurological: He is alert and oriented to person, place, and time. He has normal reflexes. Skin: Skin is warm and dry. Psychiatric: He has a normal mood and affect. His behavior is normal. Judgment and thought content normal.   Nursing note and vitals reviewed. ASSESSMENT/PLAN:  1. Kidney transplant candidate    - Zuleika Hopkins DO, Dermatology, [facility]    2. Tooth abscess    - amoxicillin-clavulanate (AUGMENTIN) 875-125 MG per tablet; Take 1 tablet by mouth 2 times daily for 10 days  Dispense: 20 tablet; Refill: 0    3. Anxiety      4. Chronic pain syndrome    - gabapentin (NEURONTIN) 100 MG capsule; Take 1-2 capsules by mouth 3 times daily for 30 days. Intended supply: 30 days  Dispense: 180 capsule; Refill: 0      No follow-ups on file. An electronic signature was used to authenticate this note.     --Johnathan Soulier, MD on 4/28/2019 at 9:31 AM

## 2019-05-04 PROBLEM — Z01.810 PREOPERATIVE CARDIOVASCULAR EXAMINATION: Status: RESOLVED | Noted: 2019-04-04 | Resolved: 2019-05-04

## 2019-05-06 ENCOUNTER — OFFICE VISIT (OUTPATIENT)
Dept: FAMILY MEDICINE CLINIC | Age: 49
End: 2019-05-06
Payer: COMMERCIAL

## 2019-05-06 VITALS
HEART RATE: 94 BPM | DIASTOLIC BLOOD PRESSURE: 70 MMHG | BODY MASS INDEX: 32.49 KG/M2 | OXYGEN SATURATION: 97 % | WEIGHT: 220 LBS | SYSTOLIC BLOOD PRESSURE: 124 MMHG

## 2019-05-06 DIAGNOSIS — Z76.82 KIDNEY TRANSPLANT CANDIDATE: Primary | ICD-10-CM

## 2019-05-06 DIAGNOSIS — N18.6 ESRD (END STAGE RENAL DISEASE) (HCC): ICD-10-CM

## 2019-05-06 DIAGNOSIS — I73.9 PVD (PERIPHERAL VASCULAR DISEASE) (HCC): ICD-10-CM

## 2019-05-06 PROCEDURE — 4004F PT TOBACCO SCREEN RCVD TLK: CPT | Performed by: FAMILY MEDICINE

## 2019-05-06 PROCEDURE — G8427 DOCREV CUR MEDS BY ELIG CLIN: HCPCS | Performed by: FAMILY MEDICINE

## 2019-05-06 PROCEDURE — 99214 OFFICE O/P EST MOD 30 MIN: CPT | Performed by: FAMILY MEDICINE

## 2019-05-06 PROCEDURE — 3017F COLORECTAL CA SCREEN DOC REV: CPT | Performed by: FAMILY MEDICINE

## 2019-05-06 PROCEDURE — G8417 CALC BMI ABV UP PARAM F/U: HCPCS | Performed by: FAMILY MEDICINE

## 2019-05-06 PROCEDURE — G8598 ASA/ANTIPLAT THER USED: HCPCS | Performed by: FAMILY MEDICINE

## 2019-05-06 PROCEDURE — 1111F DSCHRG MED/CURRENT MED MERGE: CPT | Performed by: FAMILY MEDICINE

## 2019-05-15 NOTE — PROGRESS NOTES
2019     Jazlyn Gross (:  1970) is a 44 y.o. male, here for evaluation of the following medical concerns:    Jazlyn Gross is a 44 y.o. male. Patient presents with: Other: discuss cardiac testing       61-year-old male with multiple medical problems but most significantly kidney failure who presents for follow-up today. He is currently being evaluated for transplant. Patient recently had stress test with the following results:    Mildly reduced ejection fraction with no new blockages. Arterial Dopplers of the lower extremities suggestive some arterial disease and he has been seen by Dr. Shayy Savage in the future. The patients PMH, surgical history, family history, medications, allergies were all reviewed and updated as appropriate today. Other           Review of Systems    Prior to Visit Medications    Medication Sig Taking? Authorizing Provider   LINZESS 145 MCG capsule TAKE 1 CAPSULE BY MOUTH EVERY MORNING BEFORE BREAKFAST Yes Chelsi Anderson MD   gabapentin (NEURONTIN) 100 MG capsule Take 1-2 capsules by mouth 3 times daily for 30 days.  Intended supply: 30 days Yes Chelsi Anderson MD   buPROPion (WELLBUTRIN XL) 150 MG extended release tablet TAKE 1 TABLET BY MOUTH EVERY MORNING Yes Chelsi Anderson MD   clopidogrel (PLAVIX) 75 MG tablet TAKE 1 TABLET EVERY DAY Yes Evelin Corey MD   pravastatin (PRAVACHOL) 80 MG tablet TAKE 1 TABLET EVERY DAY Yes Evelin Corey MD   albuterol (PROVENTIL) (2.5 MG/3ML) 0.083% nebulizer solution Take 3 mLs by nebulization every 6 hours as needed for Wheezing Yes Radha Alejandre MD   pantoprazole (PROTONIX) 40 MG tablet Take 1 tablet by mouth every morning (before breakfast) Yes JAY Regalado CNP   isosorbide mononitrate (IMDUR) 30 MG extended release tablet TAKE 1 TABLET EVERY DAY Yes JAY Mendez CNP   DULoxetine (CYMBALTA) 30 MG extended release capsule Take 1 capsule by mouth daily Yes JAY Regalado CNP torsemide (DEMADEX) 100 MG tablet TAKE 1 TABLET EVERY DAY Yes JAY Mendez CNP   metaxalone (SKELAXIN) 800 MG tablet TK ONE T PO Q 8 H PRN Yes Historical Provider, MD   nitroGLYCERIN (NITROSTAT) 0.4 MG SL tablet Place 1 tablet under the tongue every 5 minutes as needed for Chest pain up to max of 3 total doses. If no relief after 1 dose, call 911. Yes Barb Roach MD   traZODone (DESYREL) 150 MG tablet TAKE 1 TABLET EVERY NIGHT Yes JAY Arceo CNP   CARTIA  MG extended release capsule TAKE 1 CAPSULE EVERY DAY Yes JAY Arceo CNP   losartan (COZAAR) 50 MG tablet Take 1 tablet by mouth daily Yes Azam Bansal MD   carvedilol (COREG) 12.5 MG tablet Take 1 tablet by mouth 2 times daily (with meals) Yes Azam Bansal MD   citalopram (CELEXA) 40 MG tablet TAKE 1 TABLET EVERY DAY Yes JAY Arceo CNP   insulin glargine (LANTUS) 100 UNIT/ML injection vial Inject 10 Units into the skin nightly  Patient taking differently: Inject 10-15 Units into the skin nightly  Yes Arnaldo Bolanos MD   Insulin Syringe-Needle U-100 30G X 1/2\" 0.5 ML MISC 1 each by Does not apply route daily Yes Arnaldo Bolanos MD   oxyCODONE-acetaminophen (PERCOCET) 7.5-325 MG per tablet Take 1 tablet by mouth every 4 hours as needed for Pain. . Yes Historical Provider, MD   Pediatric Multivitamins-Fl (MULTI VIT/FL) 0.25 MG CHEW  Yes Historical Provider, MD   Polyethylene Glycol 3350 GRAN  Yes Historical Provider, MD   hydrOXYzine (VISTARIL) 50 MG capsule TAKE 1 TO 2 CAPSULES EVERY NIGHT Yes Carlito Rajput MD   B Complex-C-Folic Acid (VIRT-CAPS) 1 MG CAPS TK ONE C PO  QD Yes Historical Provider, MD   albuterol sulfate  (90 Base) MCG/ACT inhaler Inhale 2 puffs into the lungs every 6 hours as needed for Wheezing Yes Leslie Mckay MD   ipratropium-albuterol (DUONEB) 0.5-2.5 (3) MG/3ML SOLN nebulizer solution Inhale 3 mLs into the lungs every 6 hours as needed for Shortness of Breath Yes Zelda Fuentes MD   calcium carbonate (TUMS) 500 MG chewable tablet Take 1 tablet by mouth 3 times daily as needed for Heartburn. Yes Historical Provider, MD   aspirin 81 MG chewable tablet Take 1 tablet by mouth daily. Yes Christoph Mueller MD   Glucose Blood (BLOOD GLUCOSE TEST STRIPS) STRP TEST 3-4 TIMES DAILY, AS DIRECTED  Sravanthi Mcallister MD   ACCU-CHEK FASTCLIX LANCETS MISC TEST 3-4 TIMES DAILY, AS DIRECTED  Sravanthi Mcallister MD   Blood Glucose Monitoring Suppl ADAM USE AS DIRECTED. Sravanthi Mcallister MD   Alcohol Swabs PADS USE AS DIRECTED  Sravanthi Mcallister MD   glucose blood VI test strips (FREESTYLE LITE) strip Daily As needed. Brigida Castro MD   glucose monitoring kit (FREESTYLE) monitoring kit 1 kit by Does not apply route daily  Brigida Castro MD   Lancets MISC Test daily  Brigida Castro MD        Social History     Tobacco Use    Smoking status: Current Every Day Smoker     Packs/day: 0.50     Years: 33.00     Pack years: 16.50     Types: Cigarettes    Smokeless tobacco: Never Used    Tobacco comment: TRYING TO QUIT 3-7 a day   Substance Use Topics    Alcohol use: No     Alcohol/week: 0.0 oz        Vitals:    05/06/19 1556   BP: 124/70   Pulse: 94   SpO2: 97%   Weight: 220 lb (99.8 kg)     Estimated body mass index is 32.49 kg/m² as calculated from the following:    Height as of 4/12/19: 5' 9\" (1.753 m). Weight as of this encounter: 220 lb (99.8 kg). Physical Exam   Constitutional: He is oriented to person, place, and time. He appears well-developed and well-nourished. HENT:   Head: Normocephalic and atraumatic. Right Ear: External ear normal.   Left Ear: External ear normal.   Nose: Nose normal.   Mouth/Throat: Oropharynx is clear and moist.   Eyes: Pupils are equal, round, and reactive to light. Conjunctivae and EOM are normal.   Neck: Normal range of motion. Neck supple.    Cardiovascular: Normal rate, regular rhythm, normal heart sounds and intact distal pulses. Exam reveals no gallop and no friction rub. No murmur heard. Pulmonary/Chest: Effort normal and breath sounds normal. No respiratory distress. He has no wheezes. Abdominal: Soft. Bowel sounds are normal. He exhibits no distension. There is no tenderness. Musculoskeletal: Normal range of motion. He exhibits no edema or tenderness. Neurological: He is alert and oriented to person, place, and time. He has normal reflexes. Skin: Skin is warm and dry. Psychiatric: He has a normal mood and affect. His behavior is normal. Judgment and thought content normal.   Nursing note and vitals reviewed. ASSESSMENT/PLAN:  1. Kidney transplant candidate  Plan is to have him back to do a complete physical exam.    2. PVD (peripheral vascular disease) ([facility] [address]  Patient is to follow-up with Dr. Walker Thornton    3. ESRD (end stage renal disease) ([facility] [address]  Continuing following with nephrology. Return in about 2 weeks (around 5/20/2019) for cpe-30 min. An electronic signature was used to authenticate this note.     --Gena Courtney MD on 5/15/2019 at 11:10 AM

## 2019-05-17 ENCOUNTER — OFFICE VISIT (OUTPATIENT)
Dept: VASCULAR SURGERY | Age: 49
End: 2019-05-17
Payer: COMMERCIAL

## 2019-05-17 VITALS
HEIGHT: 69 IN | WEIGHT: 224 LBS | BODY MASS INDEX: 33.18 KG/M2 | SYSTOLIC BLOOD PRESSURE: 120 MMHG | DIASTOLIC BLOOD PRESSURE: 80 MMHG

## 2019-05-17 DIAGNOSIS — I73.9 PVD (PERIPHERAL VASCULAR DISEASE) WITH CLAUDICATION (HCC): Primary | ICD-10-CM

## 2019-05-17 PROCEDURE — 3017F COLORECTAL CA SCREEN DOC REV: CPT | Performed by: SURGERY

## 2019-05-17 PROCEDURE — 4004F PT TOBACCO SCREEN RCVD TLK: CPT | Performed by: SURGERY

## 2019-05-17 PROCEDURE — 99214 OFFICE O/P EST MOD 30 MIN: CPT | Performed by: SURGERY

## 2019-05-17 PROCEDURE — G8417 CALC BMI ABV UP PARAM F/U: HCPCS | Performed by: SURGERY

## 2019-05-17 PROCEDURE — G8427 DOCREV CUR MEDS BY ELIG CLIN: HCPCS | Performed by: SURGERY

## 2019-05-17 PROCEDURE — G8598 ASA/ANTIPLAT THER USED: HCPCS | Performed by: SURGERY
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
## 2019-05-20 ENCOUNTER — TELEPHONE (OUTPATIENT)
Dept: FAMILY MEDICINE CLINIC | Age: 49
End: 2019-05-20

## 2019-05-21 ENCOUNTER — OFFICE VISIT (OUTPATIENT)
Dept: PULMONOLOGY | Age: 49
End: 2019-05-21
Payer: COMMERCIAL

## 2019-05-21 ENCOUNTER — TELEPHONE (OUTPATIENT)
Dept: PULMONOLOGY | Age: 49
End: 2019-05-21

## 2019-05-21 VITALS
DIASTOLIC BLOOD PRESSURE: 73 MMHG | HEIGHT: 69 IN | RESPIRATION RATE: 20 BRPM | WEIGHT: 220 LBS | BODY MASS INDEX: 32.58 KG/M2 | OXYGEN SATURATION: 98 % | HEART RATE: 77 BPM | SYSTOLIC BLOOD PRESSURE: 116 MMHG

## 2019-05-21 DIAGNOSIS — Z99.89 OSA ON CPAP: Primary | Chronic | ICD-10-CM

## 2019-05-21 DIAGNOSIS — R06.02 SOB (SHORTNESS OF BREATH): ICD-10-CM

## 2019-05-21 DIAGNOSIS — J41.0 SIMPLE CHRONIC BRONCHITIS (HCC): ICD-10-CM

## 2019-05-21 DIAGNOSIS — Z99.2 ESRD (END STAGE RENAL DISEASE) ON DIALYSIS (HCC): ICD-10-CM

## 2019-05-21 DIAGNOSIS — E66.9 OBESITY (BMI 30-39.9): ICD-10-CM

## 2019-05-21 DIAGNOSIS — Z72.0 TOBACCO ABUSE: ICD-10-CM

## 2019-05-21 DIAGNOSIS — R09.81 NASAL CONGESTION: ICD-10-CM

## 2019-05-21 DIAGNOSIS — N18.6 ESRD (END STAGE RENAL DISEASE) ON DIALYSIS (HCC): ICD-10-CM

## 2019-05-21 DIAGNOSIS — G47.33 OSA ON CPAP: Primary | Chronic | ICD-10-CM

## 2019-05-21 PROBLEM — J42 CHRONIC BRONCHITIS (HCC): Status: ACTIVE | Noted: 2019-05-21

## 2019-05-21 PROCEDURE — 3017F COLORECTAL CA SCREEN DOC REV: CPT | Performed by: INTERNAL MEDICINE

## 2019-05-21 PROCEDURE — G8417 CALC BMI ABV UP PARAM F/U: HCPCS | Performed by: INTERNAL MEDICINE

## 2019-05-21 PROCEDURE — 4004F PT TOBACCO SCREEN RCVD TLK: CPT | Performed by: INTERNAL MEDICINE

## 2019-05-21 PROCEDURE — 99215 OFFICE O/P EST HI 40 MIN: CPT | Performed by: INTERNAL MEDICINE

## 2019-05-21 PROCEDURE — 3023F SPIROM DOC REV: CPT | Performed by: INTERNAL MEDICINE

## 2019-05-21 PROCEDURE — G8598 ASA/ANTIPLAT THER USED: HCPCS | Performed by: INTERNAL MEDICINE

## 2019-05-21 PROCEDURE — G8427 DOCREV CUR MEDS BY ELIG CLIN: HCPCS | Performed by: INTERNAL MEDICINE

## 2019-05-21 PROCEDURE — G8926 SPIRO NO PERF OR DOC: HCPCS | Performed by: INTERNAL MEDICINE

## 2019-05-21 RX ORDER — FLUNISOLIDE 0.25 MG/ML
2 SOLUTION NASAL EVERY 12 HOURS
Qty: 2 BOTTLE | Refills: 1 | Status: CANCELLED | OUTPATIENT
Start: 2019-05-21

## 2019-05-21 RX ORDER — FLUTICASONE PROPIONATE 50 MCG
1 SPRAY, SUSPENSION (ML) NASAL DAILY
Qty: 2 BOTTLE | Refills: 1 | Status: SHIPPED | OUTPATIENT
Start: 2019-05-21 | End: 2019-05-22 | Stop reason: ALTCHOICE

## 2019-05-21 NOTE — PROGRESS NOTES
MA Communication:   The following orders are received by verbal communication from Conrado Weeks MD    Orders include:  3 mo fu scheduled 8/27/19       2 samples Stiolto given to pt
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
## 2019-05-22 RX ORDER — FLUNISOLIDE 0.25 MG/ML
2 SOLUTION NASAL EVERY 12 HOURS
Qty: 1 BOTTLE | Refills: 5 | Status: ON HOLD | OUTPATIENT
Start: 2019-05-22 | End: 2021-09-03 | Stop reason: HOSPADM

## 2019-05-28 ENCOUNTER — HOSPITAL ENCOUNTER (OUTPATIENT)
Dept: CARDIAC CATH/INVASIVE PROCEDURES | Age: 49
Discharge: HOME | End: 2019-05-28
Attending: SURGERY | Admitting: SURGERY
Payer: COMMERCIAL

## 2019-05-28 VITALS — HEIGHT: 69 IN | BODY MASS INDEX: 32.58 KG/M2 | WEIGHT: 220 LBS

## 2019-05-28 LAB
ANION GAP SERPL CALCULATED.3IONS-SCNC: 13 MMOL/L (ref 3–16)
BUN BLDV-MCNC: 48 MG/DL (ref 7–20)
CALCIUM SERPL-MCNC: 9.5 MG/DL (ref 8.3–10.6)
CHLORIDE BLD-SCNC: 91 MMOL/L (ref 99–110)
CO2: 29 MMOL/L (ref 21–32)
CREAT SERPL-MCNC: 5.2 MG/DL (ref 0.9–1.3)
GFR AFRICAN AMERICAN: 14
GFR NON-AFRICAN AMERICAN: 12
GLUCOSE BLD-MCNC: 207 MG/DL (ref 70–99)
HCT VFR BLD CALC: 32.9 % (ref 40.5–52.5)
HEMOGLOBIN: 11.5 G/DL (ref 13.5–17.5)
INR BLD: 1.04 (ref 0.86–1.14)
MCH RBC QN AUTO: 33.1 PG (ref 26–34)
MCHC RBC AUTO-ENTMCNC: 35.1 G/DL (ref 31–36)
MCV RBC AUTO: 94.5 FL (ref 80–100)
PDW BLD-RTO: 13.7 % (ref 12.4–15.4)
PLATELET # BLD: 291 K/UL (ref 135–450)
PMV BLD AUTO: 7.8 FL (ref 5–10.5)
POTASSIUM SERPL-SCNC: 4.9 MMOL/L (ref 3.5–5.1)
PROTHROMBIN TIME: 11.8 SEC (ref 9.8–13)
RBC # BLD: 3.48 M/UL (ref 4.2–5.9)
SODIUM BLD-SCNC: 133 MMOL/L (ref 136–145)
WBC # BLD: 12.6 K/UL (ref 4–11)

## 2019-05-28 PROCEDURE — 36221 PLACE CATH THORACIC AORTA: CPT

## 2019-05-28 PROCEDURE — 99152 MOD SED SAME PHYS/QHP 5/>YRS: CPT

## 2019-05-28 PROCEDURE — 37221 HC ILIAC TERRITORY PLASTY STENT: CPT

## 2019-05-28 PROCEDURE — C1760 CLOSURE DEV, VASC: HCPCS

## 2019-05-28 PROCEDURE — 80048 BASIC METABOLIC PNL TOTAL CA: CPT

## 2019-05-28 PROCEDURE — 85027 COMPLETE CBC AUTOMATED: CPT

## 2019-05-28 PROCEDURE — 75716 ARTERY X-RAYS ARMS/LEGS: CPT

## 2019-05-28 PROCEDURE — 6360000004 HC RX CONTRAST MEDICATION

## 2019-05-28 PROCEDURE — 99153 MOD SED SAME PHYS/QHP EA: CPT

## 2019-05-28 PROCEDURE — 75625 CONTRAST EXAM ABDOMINL AORTA: CPT | Performed by: SURGERY

## 2019-05-28 PROCEDURE — 85610 PROTHROMBIN TIME: CPT

## 2019-05-28 PROCEDURE — 37221 PR REVSC OPN/PRQ ILIAC ART W/STNT PLMT & ANGIOPLSTY: CPT | Performed by: SURGERY

## 2019-05-28 PROCEDURE — C1876 STENT, NON-COA/NON-COV W/DEL: HCPCS

## 2019-05-28 PROCEDURE — C1894 INTRO/SHEATH, NON-LASER: HCPCS

## 2019-05-28 PROCEDURE — 6360000002 HC RX W HCPCS

## 2019-05-28 PROCEDURE — 2580000003 HC RX 258

## 2019-05-28 PROCEDURE — 2709999900 HC NON-CHARGEABLE SUPPLY

## 2019-05-28 PROCEDURE — 75716 ARTERY X-RAYS ARMS/LEGS: CPT | Performed by: SURGERY

## 2019-05-28 PROCEDURE — 6370000000 HC RX 637 (ALT 250 FOR IP)

## 2019-05-28 RX ORDER — SODIUM CHLORIDE 9 MG/ML
1000 INJECTION, SOLUTION INTRAVENOUS CONTINUOUS
Status: DISCONTINUED | OUTPATIENT
Start: 2019-05-28 | End: 2019-05-28 | Stop reason: HOSPADM

## 2019-05-28 RX ORDER — FENTANYL CITRATE 50 UG/ML
INJECTION, SOLUTION INTRAMUSCULAR; INTRAVENOUS
Status: COMPLETED | OUTPATIENT
Start: 2019-05-28 | End: 2019-05-28

## 2019-05-28 RX ORDER — ASPIRIN 81 MG/1
81 TABLET, CHEWABLE ORAL ONCE
Status: DISCONTINUED | OUTPATIENT
Start: 2019-05-28 | End: 2019-05-28 | Stop reason: HOSPADM

## 2019-05-28 RX ORDER — HEPARIN SODIUM 1000 [USP'U]/ML
INJECTION, SOLUTION INTRAVENOUS; SUBCUTANEOUS
Status: COMPLETED | OUTPATIENT
Start: 2019-05-28 | End: 2019-05-28

## 2019-05-28 RX ORDER — MIDAZOLAM HYDROCHLORIDE 5 MG/ML
INJECTION INTRAMUSCULAR; INTRAVENOUS
Status: COMPLETED | OUTPATIENT
Start: 2019-05-28 | End: 2019-05-28

## 2019-05-28 RX ORDER — CLOPIDOGREL BISULFATE 75 MG/1
75 TABLET ORAL DAILY
Status: DISCONTINUED | OUTPATIENT
Start: 2019-05-28 | End: 2019-05-28 | Stop reason: HOSPADM

## 2019-05-28 RX ADMIN — FENTANYL CITRATE 50 MCG: 50 INJECTION, SOLUTION INTRAMUSCULAR; INTRAVENOUS at 11:01

## 2019-05-28 RX ADMIN — MIDAZOLAM HYDROCHLORIDE 2 MG: 5 INJECTION INTRAMUSCULAR; INTRAVENOUS at 10:57

## 2019-05-28 RX ADMIN — FENTANYL CITRATE 50 MCG: 50 INJECTION, SOLUTION INTRAMUSCULAR; INTRAVENOUS at 11:20

## 2019-05-28 RX ADMIN — HEPARIN SODIUM 5000 UNITS: 1000 INJECTION, SOLUTION INTRAVENOUS; SUBCUTANEOUS at 11:07

## 2019-05-28 RX ADMIN — FENTANYL CITRATE 50 MCG: 50 INJECTION, SOLUTION INTRAMUSCULAR; INTRAVENOUS at 10:56

## 2019-05-28 RX ADMIN — FENTANYL CITRATE 50 MCG: 50 INJECTION, SOLUTION INTRAMUSCULAR; INTRAVENOUS at 11:07

## 2019-05-28 NOTE — OP NOTE
[address] [address]                                OPERATIVE REPORT    PATIENT NAME: Iqra Bobo                  :        1970  MED REC NO:   [id number]                          ROOM:  ACCOUNT NO:   [de-identified]                           ADMIT DATE: 2019  PROVIDER:     Enrique Navarro MD    DATE OF PROCEDURE:  2019    PREOPERATIVE DIAGNOSES:  Claudication and peripheral vascular disease. POSTOPERATIVE DIAGNOSIS:  Claudication and peripheral vascular disease. PROCEDURE:  1. Ultrasound-guided right femoral arterial access. 2.  Insertion of catheter in the aorta. 3.  Abdominal aortogram.  4.  Bilateral lower extremity angiograms. 5.  Angioplasty and stenting high-grade right external iliac artery  stenosis. ANESTHESIA:  Local with monitored IV sedation with monitoring provided  for 25 minutes. INDICATIONS:  The patient is a 57-year-old male with history of prior  vascular interventions. He has a hip and thigh claudication  particularly on the right side. He is brought to the angio suite at  this time to undergo angiography with possible intervention. PROCEDURE:  The patient was brought to the angio suite, placed in supine  position. Monitors placed, including EKG, pulse oximetry and blood  pressure cuff. He was monitored by dedicated monitoring nurse  throughout the entire procedure for 25 minutes. The right femoral  region was prepped and draped in sterile fashion. Under ultrasound  guidance, the right common femoral artery was accessed in a retrograde  fashion. A 5-Finnish introducer sheath was placed. A digital copy of  the ultrasound image was saved and placed in the patient's record. The  wire was advanced into the abdominal aorta. Modified hook catheter was  advanced over the wire. Abdominal aortogram was then obtained.   The  catheter was pulled on just above the aortic bifurcation and bilateral  iliofemoral angiograms were obtained in multiple projections. With the  catheter just above the aortic bifurcation, bilateral lower extremity  angiogram was performed via bolus brad technique. Catheter was removed  over a Bentson wire and a 5-Cypriot sheath was removed and exchanged for  a 6-Cypriot introducer sheath. Angioplasty and stenting of high-grade  right external iliac artery stenosis was performed using a 6 mm x 57 mm  Express balloon expandable stent. Post stent angiograms demonstrated  widely patent stent with brisk flow into the common femoral level. The  sheath was removed over a 6-Cypriot Mynx closure device which was  deployed in standard fashion achieving excellent hemostasis. At the end  of procedure, the patient was transferred to the recovery area in stable  condition. ANGIOGRAPHIC FINDINGS:  The abdominal aorta demonstrates patent renal  arteries with a severe intrinsic disease within the arteries in the  distal branches. No main renal artery stenosis is identified. Bilateral common iliac arteries are patent. There is stents in both  external iliac arteries in the right at its origin, on the left there is  a much longer stent. The stents are widely patent. Several centimeters  beyond the iliac stent on the right there are several areas of  high-grade stenosis within the remaining external iliac artery. The  common femoral arteries bilaterally are normal as were the profunda  vessels. The right superficial femoral artery is patent. On the left,  there is a patent superficial femoral artery stent without significant  in-stent stenosis. Popliteal tibial arteries are patent bilaterally. The runoff below both lower extremities is via posterior tibial and  peroneal arteries. On the right side, these are remained widely patent. On the left, the posterior tibial is highly diseased vessel and however  does cross into the ankle.   Bilateral anterior tibial arteries are  occluded just beyond their origins.         Yuni Eden MD    D: 05/28/2019 11:33:54       T: 05/28/2019 11:39:02     [email or web address]  Job#: [id number]     Doc#: [id number]    CC:

## 2019-05-30 DIAGNOSIS — G89.4 CHRONIC PAIN SYNDROME: ICD-10-CM

## 2019-05-30 DIAGNOSIS — F32.A DEPRESSION, UNSPECIFIED DEPRESSION TYPE: ICD-10-CM

## 2019-05-30 DIAGNOSIS — I25.10 CORONARY ARTERY DISEASE INVOLVING NATIVE HEART WITHOUT ANGINA PECTORIS, UNSPECIFIED VESSEL OR LESION TYPE: ICD-10-CM

## 2019-05-30 RX ORDER — GABAPENTIN 100 MG/1
100-200 CAPSULE ORAL 3 TIMES DAILY
Qty: 540 CAPSULE | Refills: 1 | Status: ON HOLD | OUTPATIENT
Start: 2019-05-30 | End: 2019-10-23 | Stop reason: HOSPADM

## 2019-05-30 RX ORDER — HYDROXYZINE PAMOATE 50 MG/1
CAPSULE ORAL
Qty: 180 CAPSULE | Refills: 1 | Status: SHIPPED | OUTPATIENT
Start: 2019-05-30 | End: 2019-12-04 | Stop reason: SDUPTHER

## 2019-05-30 RX ORDER — DILTIAZEM HYDROCHLORIDE 180 MG/1
CAPSULE, COATED, EXTENDED RELEASE ORAL
Qty: 90 CAPSULE | Refills: 1 | Status: SHIPPED | OUTPATIENT
Start: 2019-05-30 | End: 2019-10-09 | Stop reason: SDUPTHER

## 2019-05-30 RX ORDER — NEPHROCAP 1 MG
CAP ORAL
Qty: 90 CAPSULE | Refills: 1 | Status: SHIPPED | OUTPATIENT
Start: 2019-05-30 | End: 2020-04-21 | Stop reason: SDUPTHER

## 2019-05-30 RX ORDER — LOSARTAN POTASSIUM 50 MG/1
50 TABLET ORAL DAILY
Qty: 90 TABLET | Refills: 1 | Status: ON HOLD | OUTPATIENT
Start: 2019-05-30 | End: 2019-10-23 | Stop reason: HOSPADM

## 2019-05-30 RX ORDER — PANTOPRAZOLE SODIUM 40 MG/1
40 TABLET, DELAYED RELEASE ORAL
Qty: 90 TABLET | Refills: 1 | Status: SHIPPED | OUTPATIENT
Start: 2019-05-30 | End: 2020-03-10

## 2019-05-30 RX ORDER — CITALOPRAM 40 MG/1
TABLET ORAL
Qty: 90 TABLET | Refills: 1 | Status: SHIPPED | OUTPATIENT
Start: 2019-05-30 | End: 2019-10-09 | Stop reason: SDUPTHER

## 2019-05-30 RX ORDER — INSULIN GLARGINE 100 [IU]/ML
10-15 INJECTION, SOLUTION SUBCUTANEOUS NIGHTLY
Qty: 6 VIAL | Refills: 1 | Status: ON HOLD | OUTPATIENT
Start: 2019-05-30 | End: 2020-02-04 | Stop reason: SDUPTHER

## 2019-05-30 RX ORDER — ALBUTEROL SULFATE 2.5 MG/3ML
2.5 SOLUTION RESPIRATORY (INHALATION) EVERY 6 HOURS PRN
Qty: 360 EACH | Refills: 1 | Status: SHIPPED | OUTPATIENT
Start: 2019-05-30 | End: 2019-06-20 | Stop reason: SDUPTHER

## 2019-05-30 RX ORDER — CARVEDILOL 12.5 MG/1
12.5 TABLET ORAL 2 TIMES DAILY WITH MEALS
Qty: 180 TABLET | Refills: 1 | Status: SHIPPED | OUTPATIENT
Start: 2019-05-30 | End: 2019-10-09 | Stop reason: SDUPTHER

## 2019-05-30 RX ORDER — TRAZODONE HYDROCHLORIDE 150 MG/1
TABLET ORAL
Qty: 90 TABLET | Refills: 1 | Status: SHIPPED | OUTPATIENT
Start: 2019-05-30 | End: 2019-10-09 | Stop reason: SDUPTHER

## 2019-05-30 RX ORDER — MULTIVITAMINS WITH FLUORIDE 0.25 MG
25 TABLET,CHEWABLE ORAL DAILY
Qty: 90 TABLET | Refills: 1 | Status: SHIPPED | OUTPATIENT
Start: 2019-05-30 | End: 2020-10-12

## 2019-05-30 RX ORDER — NITROGLYCERIN 0.4 MG/1
0.4 TABLET SUBLINGUAL EVERY 5 MIN PRN
Qty: 25 TABLET | Refills: 3 | Status: SHIPPED | OUTPATIENT
Start: 2019-05-30 | End: 2019-06-21 | Stop reason: SDUPTHER

## 2019-05-30 RX ORDER — METAXALONE 800 MG/1
TABLET ORAL
Qty: 270 TABLET | Refills: 1 | Status: ON HOLD | OUTPATIENT
Start: 2019-05-30 | End: 2019-10-23 | Stop reason: HOSPADM

## 2019-06-07 ENCOUNTER — TELEPHONE (OUTPATIENT)
Dept: FAMILY MEDICINE CLINIC | Age: 49
End: 2019-06-07

## 2019-06-07 RX ORDER — TIZANIDINE 4 MG/1
4 TABLET ORAL 3 TIMES DAILY
Qty: 90 TABLET | Refills: 0 | Status: SHIPPED | OUTPATIENT
Start: 2019-06-07 | End: 2019-06-21 | Stop reason: SDUPTHER

## 2019-06-07 NOTE — TELEPHONE ENCOUNTER
Nilson Guo is requesting an alternative for metaxalone (SKELAXIN) 800 MG tablet. It is not covered by the patient's insurance. Preferred alternatives are tizanidine and cyclobenzaprine.

## 2019-06-11 DIAGNOSIS — K59.04 CHRONIC IDIOPATHIC CONSTIPATION: ICD-10-CM

## 2019-06-12 ENCOUNTER — OFFICE VISIT (OUTPATIENT)
Dept: FAMILY MEDICINE CLINIC | Age: 49
End: 2019-06-12
Payer: COMMERCIAL

## 2019-06-12 VITALS
SYSTOLIC BLOOD PRESSURE: 110 MMHG | WEIGHT: 223 LBS | DIASTOLIC BLOOD PRESSURE: 60 MMHG | BODY MASS INDEX: 32.93 KG/M2 | OXYGEN SATURATION: 97 % | HEART RATE: 84 BPM

## 2019-06-12 DIAGNOSIS — N18.6 ESRD (END STAGE RENAL DISEASE) (HCC): Primary | ICD-10-CM

## 2019-06-12 DIAGNOSIS — Z76.82 KIDNEY TRANSPLANT CANDIDATE: ICD-10-CM

## 2019-06-12 PROCEDURE — G8417 CALC BMI ABV UP PARAM F/U: HCPCS | Performed by: FAMILY MEDICINE

## 2019-06-12 PROCEDURE — 1036F TOBACCO NON-USER: CPT | Performed by: FAMILY MEDICINE

## 2019-06-12 PROCEDURE — G8427 DOCREV CUR MEDS BY ELIG CLIN: HCPCS | Performed by: FAMILY MEDICINE

## 2019-06-12 PROCEDURE — G8598 ASA/ANTIPLAT THER USED: HCPCS | Performed by: FAMILY MEDICINE

## 2019-06-12 PROCEDURE — 3017F COLORECTAL CA SCREEN DOC REV: CPT | Performed by: FAMILY MEDICINE

## 2019-06-12 PROCEDURE — 99214 OFFICE O/P EST MOD 30 MIN: CPT | Performed by: FAMILY MEDICINE

## 2019-06-12 RX ORDER — QUETIAPINE FUMARATE 25 MG/1
25 TABLET, FILM COATED ORAL EVERY EVENING
Qty: 30 TABLET | Refills: 0 | Status: SHIPPED | OUTPATIENT
Start: 2019-06-12 | End: 2019-07-18 | Stop reason: SDUPTHER
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
## 2019-06-20 ENCOUNTER — OFFICE VISIT (OUTPATIENT)
Dept: SURGERY | Age: 49
End: 2019-06-20
Payer: COMMERCIAL

## 2019-06-20 VITALS
DIASTOLIC BLOOD PRESSURE: 68 MMHG | WEIGHT: 227 LBS | BODY MASS INDEX: 33.62 KG/M2 | HEIGHT: 69 IN | OXYGEN SATURATION: 100 % | HEART RATE: 64 BPM | SYSTOLIC BLOOD PRESSURE: 126 MMHG

## 2019-06-20 DIAGNOSIS — N18.5 CHRONIC RENAL FAILURE, STAGE 5 (HCC): Primary | ICD-10-CM

## 2019-06-20 PROCEDURE — 99212 OFFICE O/P EST SF 10 MIN: CPT | Performed by: SURGERY

## 2019-06-20 PROCEDURE — 3017F COLORECTAL CA SCREEN DOC REV: CPT | Performed by: SURGERY

## 2019-06-20 PROCEDURE — G8417 CALC BMI ABV UP PARAM F/U: HCPCS | Performed by: SURGERY

## 2019-06-20 PROCEDURE — G8598 ASA/ANTIPLAT THER USED: HCPCS | Performed by: SURGERY

## 2019-06-20 PROCEDURE — G8427 DOCREV CUR MEDS BY ELIG CLIN: HCPCS | Performed by: SURGERY

## 2019-06-20 PROCEDURE — 1036F TOBACCO NON-USER: CPT | Performed by: SURGERY

## 2019-06-20 RX ORDER — ALBUTEROL SULFATE 2.5 MG/3ML
2.5 SOLUTION RESPIRATORY (INHALATION) EVERY 6 HOURS PRN
Qty: 1080 EACH | Refills: 3 | Status: SHIPPED | OUTPATIENT
Start: 2019-06-20 | End: 2020-01-21

## 2019-06-21 ENCOUNTER — TELEPHONE (OUTPATIENT)
Dept: SURGERY | Age: 49
End: 2019-06-21

## 2019-06-21 DIAGNOSIS — I25.10 CORONARY ARTERY DISEASE INVOLVING NATIVE HEART WITHOUT ANGINA PECTORIS, UNSPECIFIED VESSEL OR LESION TYPE: ICD-10-CM

## 2019-06-21 RX ORDER — NITROGLYCERIN 0.4 MG/1
0.4 TABLET SUBLINGUAL EVERY 5 MIN PRN
Qty: 25 TABLET | Refills: 3 | Status: SHIPPED | OUTPATIENT
Start: 2019-06-21 | End: 2020-01-27 | Stop reason: SDUPTHER

## 2019-06-21 RX ORDER — TIZANIDINE 4 MG/1
4 TABLET ORAL 3 TIMES DAILY
Qty: 90 TABLET | Refills: 0 | Status: SHIPPED | OUTPATIENT
Start: 2019-06-21 | End: 2019-07-18 | Stop reason: SDUPTHER

## 2019-06-21 NOTE — TELEPHONE ENCOUNTER
Called and spoke to pt scheduled for Peritoneal dialysis catheter removal for Tues 7/2/19 advised 10:15 arrival noon case, advised NPO he has been instructed by pcp to stop all blood thinners 7 days prior to surgery. Has cardiac clearance. I reviewed his chart, medications and allergies. Gave all instructions and directions for surgery/ pt agrees.

## 2019-06-24 ENCOUNTER — HOSPITAL ENCOUNTER (EMERGENCY)
Age: 49
Discharge: HOME | End: 2019-06-24
Attending: EMERGENCY MEDICINE
Payer: COMMERCIAL

## 2019-06-24 ENCOUNTER — APPOINTMENT (OUTPATIENT)
Dept: GENERAL RADIOLOGY | Age: 49
End: 2019-06-24
Payer: COMMERCIAL

## 2019-06-24 VITALS
OXYGEN SATURATION: 100 % | HEIGHT: 69 IN | RESPIRATION RATE: 18 BRPM | HEART RATE: 78 BPM | SYSTOLIC BLOOD PRESSURE: 120 MMHG | WEIGHT: 234 LBS | BODY MASS INDEX: 34.66 KG/M2 | TEMPERATURE: 98.3 F | DIASTOLIC BLOOD PRESSURE: 60 MMHG

## 2019-06-24 DIAGNOSIS — R18.8 OTHER ASCITES: ICD-10-CM

## 2019-06-24 DIAGNOSIS — N18.6 STAGE 5 CHRONIC KIDNEY DISEASE ON CHRONIC DIALYSIS (HCC): ICD-10-CM

## 2019-06-24 DIAGNOSIS — R06.02 SHORTNESS OF BREATH: Primary | ICD-10-CM

## 2019-06-24 DIAGNOSIS — Z99.2 STAGE 5 CHRONIC KIDNEY DISEASE ON CHRONIC DIALYSIS (HCC): ICD-10-CM

## 2019-06-24 LAB
A/G RATIO: 1.2 (ref 1.1–2.2)
ALBUMIN SERPL-MCNC: 3.8 G/DL (ref 3.4–5)
ALP BLD-CCNC: 84 U/L (ref 40–129)
ALT SERPL-CCNC: 19 U/L (ref 10–40)
ANION GAP SERPL CALCULATED.3IONS-SCNC: 14 MMOL/L (ref 3–16)
AST SERPL-CCNC: 34 U/L (ref 15–37)
BASOPHILS ABSOLUTE: 0.1 K/UL (ref 0–0.2)
BASOPHILS RELATIVE PERCENT: 1.4 %
BILIRUB SERPL-MCNC: <0.2 MG/DL (ref 0–1)
BUN BLDV-MCNC: 68 MG/DL (ref 7–20)
CALCIUM SERPL-MCNC: 9.2 MG/DL (ref 8.3–10.6)
CHLORIDE BLD-SCNC: 91 MMOL/L (ref 99–110)
CO2: 30 MMOL/L (ref 21–32)
CREAT SERPL-MCNC: 5.2 MG/DL (ref 0.9–1.3)
EKG ATRIAL RATE: 70 BPM
EKG DIAGNOSIS: NORMAL
EKG P AXIS: 80 DEGREES
EKG P-R INTERVAL: 194 MS
EKG Q-T INTERVAL: 430 MS
EKG QRS DURATION: 98 MS
EKG QTC CALCULATION (BAZETT): 464 MS
EKG R AXIS: 24 DEGREES
EKG T AXIS: 92 DEGREES
EKG VENTRICULAR RATE: 70 BPM
EOSINOPHILS ABSOLUTE: 0.4 K/UL (ref 0–0.6)
EOSINOPHILS RELATIVE PERCENT: 4 %
GFR AFRICAN AMERICAN: 14
GFR NON-AFRICAN AMERICAN: 12
GLOBULIN: 3.3 G/DL
GLUCOSE BLD-MCNC: 181 MG/DL (ref 70–99)
HCT VFR BLD CALC: 29.8 % (ref 40.5–52.5)
HEMOGLOBIN: 10.3 G/DL (ref 13.5–17.5)
LYMPHOCYTES ABSOLUTE: 1.6 K/UL (ref 1–5.1)
LYMPHOCYTES RELATIVE PERCENT: 16.5 %
MCH RBC QN AUTO: 33.2 PG (ref 26–34)
MCHC RBC AUTO-ENTMCNC: 34.6 G/DL (ref 31–36)
MCV RBC AUTO: 95.8 FL (ref 80–100)
MONOCYTES ABSOLUTE: 0.7 K/UL (ref 0–1.3)
MONOCYTES RELATIVE PERCENT: 7.1 %
NEUTROPHILS ABSOLUTE: 7 K/UL (ref 1.7–7.7)
NEUTROPHILS RELATIVE PERCENT: 71 %
PDW BLD-RTO: 13.7 % (ref 12.4–15.4)
PLATELET # BLD: 262 K/UL (ref 135–450)
PMV BLD AUTO: 7.7 FL (ref 5–10.5)
POTASSIUM REFLEX MAGNESIUM: 5.4 MMOL/L (ref 3.5–5.1)
RBC # BLD: 3.11 M/UL (ref 4.2–5.9)
SODIUM BLD-SCNC: 135 MMOL/L (ref 136–145)
TOTAL PROTEIN: 7.1 G/DL (ref 6.4–8.2)
WBC # BLD: 9.8 K/UL (ref 4–11)

## 2019-06-24 PROCEDURE — 85025 COMPLETE CBC W/AUTO DIFF WBC: CPT

## 2019-06-24 PROCEDURE — 99285 EMERGENCY DEPT VISIT HI MDM: CPT

## 2019-06-24 PROCEDURE — 96374 THER/PROPH/DIAG INJ IV PUSH: CPT

## 2019-06-24 PROCEDURE — 93005 ELECTROCARDIOGRAM TRACING: CPT | Performed by: EMERGENCY MEDICINE

## 2019-06-24 PROCEDURE — 71045 X-RAY EXAM CHEST 1 VIEW: CPT

## 2019-06-24 PROCEDURE — 93010 ELECTROCARDIOGRAM REPORT: CPT | Performed by: INTERNAL MEDICINE

## 2019-06-24 PROCEDURE — 6360000002 HC RX W HCPCS: Performed by: EMERGENCY MEDICINE

## 2019-06-24 PROCEDURE — 6370000000 HC RX 637 (ALT 250 FOR IP): Performed by: EMERGENCY MEDICINE

## 2019-06-24 PROCEDURE — 80053 COMPREHEN METABOLIC PANEL: CPT

## 2019-06-24 RX ORDER — OXYCODONE AND ACETAMINOPHEN 7.5; 325 MG/1; MG/1
1 TABLET ORAL ONCE
Status: COMPLETED | OUTPATIENT
Start: 2019-06-24 | End: 2019-06-24

## 2019-06-24 RX ORDER — FUROSEMIDE 10 MG/ML
80 INJECTION INTRAMUSCULAR; INTRAVENOUS ONCE
Status: COMPLETED | OUTPATIENT
Start: 2019-06-24 | End: 2019-06-24

## 2019-06-24 RX ADMIN — FUROSEMIDE 80 MG: 10 INJECTION, SOLUTION INTRAMUSCULAR; INTRAVENOUS at 02:44

## 2019-06-24 RX ADMIN — OXYCODONE HYDROCHLORIDE AND ACETAMINOPHEN 1 TABLET: 7.5; 325 TABLET ORAL at 02:43

## 2019-06-24 ASSESSMENT — PAIN DESCRIPTION - PAIN TYPE: TYPE: CHRONIC PAIN

## 2019-06-24 ASSESSMENT — PAIN SCALES - GENERAL
PAINLEVEL_OUTOF10: 9
PAINLEVEL_OUTOF10: 6

## 2019-06-24 ASSESSMENT — PAIN DESCRIPTION - LOCATION: LOCATION: BACK

## 2019-06-24 NOTE — ED NOTES
Pt ambulated 100ft; O2 saturation remained at 100% RA HR 75; tolerated well.       Shaq Corral RN  06/24/19 [id number]
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
## 2019-06-26 RX ORDER — ERGOCALCIFEROL 1.25 MG/1
CAPSULE ORAL
Refills: 11 | COMMUNITY
Start: 2019-06-02

## 2019-06-26 RX ORDER — LACTULOSE 10 G/15ML
SOLUTION ORAL; RECTAL
Refills: 0 | Status: ON HOLD | COMMUNITY
Start: 2019-04-11 | End: 2019-10-23 | Stop reason: HOSPADM

## 2019-06-26 RX ORDER — CYCLOBENZAPRINE HCL 10 MG
TABLET ORAL
Refills: 2 | Status: ON HOLD | COMMUNITY
Start: 2019-05-24 | End: 2019-10-23 | Stop reason: HOSPADM

## 2019-06-26 NOTE — PROGRESS NOTES
Obstructive Sleep Apnea (ZAINAB) Screening     Patient:  Tianna Marie    YOB: 1970      Medical Record #:  [id number]                     Date:  6/26/2019     1. Are you a loud and/or regular snorer? []  Yes       [x] No    2. Have you been observed to gasp or stop breathing during sleep? []  Yes       [x] No    3. Do you feel tired or groggy upon awakening or do you awaken with a headache?           []  Yes       [x] No    4. Are you often tired or fatigued during the wake time hours? []  Yes       [x] No    5. Do you fall asleep sitting, reading, watching TV or driving? []  Yes       [x] No    6. Do you often have problems with memory or concentration? []  Yes       [x] No    **If patient's score is ? 3 they are considered high risk for ZAINAB. Notify the anesthesiologist of the high risk and document in focus note. Note:  If the patient's BMI is more than 35 kg m¯² , has neck circumference > 40 cm, and/or high blood pressure the risk is greater (© American Sleep Apnea Association, 2006). Wears CPAP-pt doesn't want to bring in.says won\"t need.

## 2019-06-27 NOTE — PROGRESS NOTES
HPI: Nursing notes reviewed. Patient reports issues with PD catheter working and wants removed. No pain or nausea. No fevers. ROS:  10 point review of systems performed with pertinent positives in HPI    Phys:    Abd - soft. Nontender, nondistended, pd cath secure       Assesment: 43 yo with PD catheter    Plan: 1. Plan PD catheter removal soon   2. Risks of procedure and recovery reviewed.

## 2019-07-02 ENCOUNTER — HOSPITAL ENCOUNTER (OUTPATIENT)
Age: 49
Setting detail: OUTPATIENT SURGERY
Discharge: HOME | End: 2019-07-02
Attending: SURGERY | Admitting: SURGERY
Payer: COMMERCIAL

## 2019-07-02 ENCOUNTER — ANESTHESIA (OUTPATIENT)
Dept: OPERATING ROOM | Age: 49
End: 2019-07-02
Payer: COMMERCIAL

## 2019-07-02 ENCOUNTER — ANESTHESIA EVENT (OUTPATIENT)
Dept: OPERATING ROOM | Age: 49
End: 2019-07-02
Payer: COMMERCIAL

## 2019-07-02 VITALS
HEIGHT: 69 IN | TEMPERATURE: 97.3 F | OXYGEN SATURATION: 95 % | SYSTOLIC BLOOD PRESSURE: 147 MMHG | BODY MASS INDEX: 34.38 KG/M2 | WEIGHT: 232.13 LBS | DIASTOLIC BLOOD PRESSURE: 84 MMHG | HEART RATE: 74 BPM | RESPIRATION RATE: 16 BRPM

## 2019-07-02 VITALS — OXYGEN SATURATION: 100 % | DIASTOLIC BLOOD PRESSURE: 72 MMHG | SYSTOLIC BLOOD PRESSURE: 114 MMHG

## 2019-07-02 DIAGNOSIS — N18.6 END-STAGE RENAL DISEASE (HCC): Primary | ICD-10-CM

## 2019-07-02 LAB
ANION GAP SERPL CALCULATED.3IONS-SCNC: 15 MMOL/L (ref 3–16)
APTT: 27.5 SEC (ref 26–36)
BUN BLDV-MCNC: 46 MG/DL (ref 7–20)
CALCIUM SERPL-MCNC: 9.3 MG/DL (ref 8.3–10.6)
CHLORIDE BLD-SCNC: 91 MMOL/L (ref 99–110)
CO2: 27 MMOL/L (ref 21–32)
CREAT SERPL-MCNC: 4.1 MG/DL (ref 0.9–1.3)
GFR AFRICAN AMERICAN: 19
GFR NON-AFRICAN AMERICAN: 15
GLUCOSE BLD-MCNC: 205 MG/DL (ref 70–99)
GLUCOSE BLD-MCNC: 214 MG/DL (ref 70–99)
GLUCOSE BLD-MCNC: 275 MG/DL (ref 70–99)
INR BLD: 0.93 (ref 0.86–1.14)
PERFORMED ON: ABNORMAL
PERFORMED ON: ABNORMAL
POTASSIUM SERPL-SCNC: 4.8 MMOL/L (ref 3.5–5.1)
PROTHROMBIN TIME: 10.6 SEC (ref 9.8–13)
SODIUM BLD-SCNC: 133 MMOL/L (ref 136–145)

## 2019-07-02 PROCEDURE — 85610 PROTHROMBIN TIME: CPT

## 2019-07-02 PROCEDURE — 2500000003 HC RX 250 WO HCPCS: Performed by: NURSE ANESTHETIST, CERTIFIED REGISTERED

## 2019-07-02 PROCEDURE — 3600000014 HC SURGERY LEVEL 4 ADDTL 15MIN: Performed by: SURGERY

## 2019-07-02 PROCEDURE — 7100000001 HC PACU RECOVERY - ADDTL 15 MIN: Performed by: SURGERY

## 2019-07-02 PROCEDURE — 2580000003 HC RX 258: Performed by: ANESTHESIOLOGY

## 2019-07-02 PROCEDURE — 2709999900 HC NON-CHARGEABLE SUPPLY: Performed by: SURGERY

## 2019-07-02 PROCEDURE — 6370000000 HC RX 637 (ALT 250 FOR IP)

## 2019-07-02 PROCEDURE — 80048 BASIC METABOLIC PNL TOTAL CA: CPT

## 2019-07-02 PROCEDURE — 7100000010 HC PHASE II RECOVERY - FIRST 15 MIN: Performed by: SURGERY

## 2019-07-02 PROCEDURE — 7100000011 HC PHASE II RECOVERY - ADDTL 15 MIN: Performed by: SURGERY

## 2019-07-02 PROCEDURE — 3600000004 HC SURGERY LEVEL 4 BASE: Performed by: SURGERY

## 2019-07-02 PROCEDURE — 3700000000 HC ANESTHESIA ATTENDED CARE: Performed by: SURGERY

## 2019-07-02 PROCEDURE — 6370000000 HC RX 637 (ALT 250 FOR IP): Performed by: ANESTHESIOLOGY

## 2019-07-02 PROCEDURE — 6360000002 HC RX W HCPCS: Performed by: NURSE ANESTHETIST, CERTIFIED REGISTERED

## 2019-07-02 PROCEDURE — 7100000000 HC PACU RECOVERY - FIRST 15 MIN: Performed by: SURGERY

## 2019-07-02 PROCEDURE — 2580000003 HC RX 258: Performed by: NURSE ANESTHETIST, CERTIFIED REGISTERED

## 2019-07-02 PROCEDURE — 6360000002 HC RX W HCPCS

## 2019-07-02 PROCEDURE — 85730 THROMBOPLASTIN TIME PARTIAL: CPT

## 2019-07-02 PROCEDURE — 2500000003 HC RX 250 WO HCPCS: Performed by: SURGERY

## 2019-07-02 PROCEDURE — 3700000001 HC ADD 15 MINUTES (ANESTHESIA): Performed by: SURGERY

## 2019-07-02 PROCEDURE — 2580000003 HC RX 258: Performed by: SURGERY

## 2019-07-02 PROCEDURE — 6360000002 HC RX W HCPCS: Performed by: ANESTHESIOLOGY

## 2019-07-02 PROCEDURE — 49422 REMOVE TUNNELED IP CATH: CPT | Performed by: SURGERY

## 2019-07-02 RX ORDER — CARVEDILOL 6.25 MG/1
12.5 TABLET ORAL ONCE
Status: COMPLETED | OUTPATIENT
Start: 2019-07-02 | End: 2019-07-02

## 2019-07-02 RX ORDER — BUPIVACAINE HYDROCHLORIDE AND EPINEPHRINE 5; 5 MG/ML; UG/ML
INJECTION, SOLUTION EPIDURAL; INTRACAUDAL; PERINEURAL PRN
Status: DISCONTINUED | OUTPATIENT
Start: 2019-07-02 | End: 2019-07-02 | Stop reason: ALTCHOICE

## 2019-07-02 RX ORDER — HYDRALAZINE HYDROCHLORIDE 20 MG/ML
5 INJECTION INTRAMUSCULAR; INTRAVENOUS EVERY 10 MIN PRN
Status: DISCONTINUED | OUTPATIENT
Start: 2019-07-02 | End: 2019-07-02 | Stop reason: HOSPADM

## 2019-07-02 RX ORDER — SODIUM CHLORIDE 9 MG/ML
INJECTION, SOLUTION INTRAVENOUS CONTINUOUS
Status: DISCONTINUED | OUTPATIENT
Start: 2019-07-02 | End: 2019-07-02 | Stop reason: HOSPADM

## 2019-07-02 RX ORDER — LABETALOL HYDROCHLORIDE 5 MG/ML
5 INJECTION, SOLUTION INTRAVENOUS EVERY 10 MIN PRN
Status: DISCONTINUED | OUTPATIENT
Start: 2019-07-02 | End: 2019-07-02 | Stop reason: HOSPADM

## 2019-07-02 RX ORDER — ONDANSETRON 2 MG/ML
INJECTION INTRAMUSCULAR; INTRAVENOUS PRN
Status: DISCONTINUED | OUTPATIENT
Start: 2019-07-02 | End: 2019-07-02 | Stop reason: SDUPTHER

## 2019-07-02 RX ORDER — MAGNESIUM HYDROXIDE 1200 MG/15ML
LIQUID ORAL CONTINUOUS PRN
Status: COMPLETED | OUTPATIENT
Start: 2019-07-02 | End: 2019-07-02

## 2019-07-02 RX ORDER — PROMETHAZINE HYDROCHLORIDE 25 MG/ML
6.25 INJECTION, SOLUTION INTRAMUSCULAR; INTRAVENOUS
Status: DISCONTINUED | OUTPATIENT
Start: 2019-07-02 | End: 2019-07-02 | Stop reason: HOSPADM

## 2019-07-02 RX ORDER — DEXAMETHASONE SODIUM PHOSPHATE 4 MG/ML
INJECTION, SOLUTION INTRA-ARTICULAR; INTRALESIONAL; INTRAMUSCULAR; INTRAVENOUS; SOFT TISSUE PRN
Status: DISCONTINUED | OUTPATIENT
Start: 2019-07-02 | End: 2019-07-02 | Stop reason: SDUPTHER

## 2019-07-02 RX ORDER — CARVEDILOL 6.25 MG/1
TABLET ORAL
Status: COMPLETED
Start: 2019-07-02 | End: 2019-07-02

## 2019-07-02 RX ORDER — ONDANSETRON 2 MG/ML
4 INJECTION INTRAMUSCULAR; INTRAVENOUS
Status: COMPLETED | OUTPATIENT
Start: 2019-07-02 | End: 2019-07-02

## 2019-07-02 RX ORDER — OXYCODONE HYDROCHLORIDE AND ACETAMINOPHEN 5; 325 MG/1; MG/1
1 TABLET ORAL EVERY 6 HOURS PRN
Qty: 16 TABLET | Refills: 0 | Status: SHIPPED | OUTPATIENT
Start: 2019-07-02 | End: 2019-07-07

## 2019-07-02 RX ORDER — FENTANYL CITRATE 50 UG/ML
INJECTION, SOLUTION INTRAMUSCULAR; INTRAVENOUS PRN
Status: DISCONTINUED | OUTPATIENT
Start: 2019-07-02 | End: 2019-07-02 | Stop reason: SDUPTHER

## 2019-07-02 RX ORDER — LIDOCAINE HYDROCHLORIDE 10 MG/ML
2 INJECTION, SOLUTION INFILTRATION; PERINEURAL
Status: DISCONTINUED | OUTPATIENT
Start: 2019-07-02 | End: 2019-07-02 | Stop reason: HOSPADM

## 2019-07-02 RX ORDER — CARVEDILOL 6.25 MG/1
TABLET ORAL
Status: DISCONTINUED
Start: 2019-07-02 | End: 2019-07-02 | Stop reason: HOSPADM

## 2019-07-02 RX ORDER — MORPHINE SULFATE 2 MG/ML
2 INJECTION, SOLUTION INTRAMUSCULAR; INTRAVENOUS EVERY 5 MIN PRN
Status: DISCONTINUED | OUTPATIENT
Start: 2019-07-02 | End: 2019-07-02 | Stop reason: HOSPADM

## 2019-07-02 RX ORDER — MORPHINE SULFATE 2 MG/ML
1 INJECTION, SOLUTION INTRAMUSCULAR; INTRAVENOUS EVERY 5 MIN PRN
Status: DISCONTINUED | OUTPATIENT
Start: 2019-07-02 | End: 2019-07-02 | Stop reason: HOSPADM

## 2019-07-02 RX ORDER — DIPHENHYDRAMINE HYDROCHLORIDE 50 MG/ML
12.5 INJECTION INTRAMUSCULAR; INTRAVENOUS
Status: DISCONTINUED | OUTPATIENT
Start: 2019-07-02 | End: 2019-07-02 | Stop reason: HOSPADM

## 2019-07-02 RX ORDER — ROCURONIUM BROMIDE 10 MG/ML
INJECTION, SOLUTION INTRAVENOUS PRN
Status: DISCONTINUED | OUTPATIENT
Start: 2019-07-02 | End: 2019-07-02 | Stop reason: SDUPTHER

## 2019-07-02 RX ORDER — OXYCODONE HYDROCHLORIDE AND ACETAMINOPHEN 5; 325 MG/1; MG/1
2 TABLET ORAL PRN
Status: COMPLETED | OUTPATIENT
Start: 2019-07-02 | End: 2019-07-02

## 2019-07-02 RX ORDER — MIDAZOLAM HYDROCHLORIDE 1 MG/ML
INJECTION INTRAMUSCULAR; INTRAVENOUS PRN
Status: DISCONTINUED | OUTPATIENT
Start: 2019-07-02 | End: 2019-07-02 | Stop reason: SDUPTHER

## 2019-07-02 RX ORDER — OXYCODONE HYDROCHLORIDE AND ACETAMINOPHEN 5; 325 MG/1; MG/1
1 TABLET ORAL PRN
Status: COMPLETED | OUTPATIENT
Start: 2019-07-02 | End: 2019-07-02

## 2019-07-02 RX ORDER — LIDOCAINE HYDROCHLORIDE 20 MG/ML
INJECTION, SOLUTION EPIDURAL; INFILTRATION; INTRACAUDAL; PERINEURAL PRN
Status: DISCONTINUED | OUTPATIENT
Start: 2019-07-02 | End: 2019-07-02 | Stop reason: SDUPTHER

## 2019-07-02 RX ORDER — MEPERIDINE HYDROCHLORIDE 50 MG/ML
12.5 INJECTION INTRAMUSCULAR; INTRAVENOUS; SUBCUTANEOUS EVERY 5 MIN PRN
Status: DISCONTINUED | OUTPATIENT
Start: 2019-07-02 | End: 2019-07-02 | Stop reason: HOSPADM

## 2019-07-02 RX ORDER — SUCCINYLCHOLINE CHLORIDE 20 MG/ML
INJECTION INTRAMUSCULAR; INTRAVENOUS PRN
Status: DISCONTINUED | OUTPATIENT
Start: 2019-07-02 | End: 2019-07-02 | Stop reason: SDUPTHER

## 2019-07-02 RX ORDER — SODIUM CHLORIDE 9 MG/ML
INJECTION, SOLUTION INTRAVENOUS CONTINUOUS PRN
Status: DISCONTINUED | OUTPATIENT
Start: 2019-07-02 | End: 2019-07-02 | Stop reason: SDUPTHER

## 2019-07-02 RX ORDER — PROPOFOL 10 MG/ML
INJECTION, EMULSION INTRAVENOUS PRN
Status: DISCONTINUED | OUTPATIENT
Start: 2019-07-02 | End: 2019-07-02 | Stop reason: SDUPTHER

## 2019-07-02 RX ADMIN — OXYCODONE AND ACETAMINOPHEN 1 TABLET: 5; 325 TABLET ORAL at 15:14

## 2019-07-02 RX ADMIN — ONDANSETRON 4 MG: 2 INJECTION INTRAMUSCULAR; INTRAVENOUS at 12:14

## 2019-07-02 RX ADMIN — ROCURONIUM BROMIDE 5 MG: 10 SOLUTION INTRAVENOUS at 11:59

## 2019-07-02 RX ADMIN — SUCCINYLCHOLINE CHLORIDE 140 MG: 20 INJECTION, SOLUTION INTRAMUSCULAR; INTRAVENOUS at 11:59

## 2019-07-02 RX ADMIN — FENTANYL CITRATE 50 MCG: 50 INJECTION INTRAMUSCULAR; INTRAVENOUS at 12:06

## 2019-07-02 RX ADMIN — CARVEDILOL 12.5 MG: 6.25 TABLET, FILM COATED ORAL at 11:45

## 2019-07-02 RX ADMIN — HYDROMORPHONE HYDROCHLORIDE 0.5 MG: 1 INJECTION, SOLUTION INTRAMUSCULAR; INTRAVENOUS; SUBCUTANEOUS at 13:06

## 2019-07-02 RX ADMIN — HYDROMORPHONE HYDROCHLORIDE 0.5 MG: 1 INJECTION, SOLUTION INTRAMUSCULAR; INTRAVENOUS; SUBCUTANEOUS at 14:08

## 2019-07-02 RX ADMIN — HYDROMORPHONE HYDROCHLORIDE 0.5 MG: 1 INJECTION, SOLUTION INTRAMUSCULAR; INTRAVENOUS; SUBCUTANEOUS at 12:58

## 2019-07-02 RX ADMIN — LIDOCAINE HYDROCHLORIDE 60 MG: 20 INJECTION, SOLUTION EPIDURAL; INFILTRATION; INTRACAUDAL; PERINEURAL at 11:59

## 2019-07-02 RX ADMIN — HYDROMORPHONE HYDROCHLORIDE 0.5 MG: 1 INJECTION, SOLUTION INTRAMUSCULAR; INTRAVENOUS; SUBCUTANEOUS at 13:19

## 2019-07-02 RX ADMIN — HYDROMORPHONE HYDROCHLORIDE 0.5 MG: 1 INJECTION, SOLUTION INTRAMUSCULAR; INTRAVENOUS; SUBCUTANEOUS at 11:42

## 2019-07-02 RX ADMIN — MIDAZOLAM HYDROCHLORIDE 2 MG: 2 INJECTION, SOLUTION INTRAMUSCULAR; INTRAVENOUS at 11:50

## 2019-07-02 RX ADMIN — HYDROMORPHONE HYDROCHLORIDE 0.5 MG: 1 INJECTION, SOLUTION INTRAMUSCULAR; INTRAVENOUS; SUBCUTANEOUS at 14:26

## 2019-07-02 RX ADMIN — Medication 0.5 MG: at 11:42

## 2019-07-02 RX ADMIN — SODIUM CHLORIDE: 9 INJECTION, SOLUTION INTRAVENOUS at 11:15

## 2019-07-02 RX ADMIN — DEXAMETHASONE SODIUM PHOSPHATE 4 MG: 4 INJECTION, SOLUTION INTRAMUSCULAR; INTRAVENOUS at 12:14

## 2019-07-02 RX ADMIN — HYDROMORPHONE HYDROCHLORIDE 0.5 MG: 1 INJECTION, SOLUTION INTRAMUSCULAR; INTRAVENOUS; SUBCUTANEOUS at 14:36

## 2019-07-02 RX ADMIN — FENTANYL CITRATE 25 MCG: 50 INJECTION INTRAMUSCULAR; INTRAVENOUS at 12:28

## 2019-07-02 RX ADMIN — SODIUM CHLORIDE: 9 INJECTION, SOLUTION INTRAVENOUS at 11:09

## 2019-07-02 RX ADMIN — ONDANSETRON 4 MG: 2 INJECTION INTRAMUSCULAR; INTRAVENOUS at 13:56

## 2019-07-02 RX ADMIN — HYDROMORPHONE HYDROCHLORIDE 0.5 MG: 1 INJECTION, SOLUTION INTRAMUSCULAR; INTRAVENOUS; SUBCUTANEOUS at 13:54

## 2019-07-02 RX ADMIN — CARVEDILOL 12.5 MG: 6.25 TABLET ORAL at 11:45

## 2019-07-02 RX ADMIN — PROPOFOL 200 MG: 10 INJECTION, EMULSION INTRAVENOUS at 11:59

## 2019-07-02 RX ADMIN — HYDROMORPHONE HYDROCHLORIDE 0.5 MG: 1 INJECTION, SOLUTION INTRAMUSCULAR; INTRAVENOUS; SUBCUTANEOUS at 13:36

## 2019-07-02 ASSESSMENT — PAIN DESCRIPTION - DESCRIPTORS
DESCRIPTORS: DISCOMFORT
DESCRIPTORS: DISCOMFORT
DESCRIPTORS: SHARP;STABBING

## 2019-07-02 ASSESSMENT — PULMONARY FUNCTION TESTS
PIF_VALUE: 24
PIF_VALUE: 1
PIF_VALUE: 1
PIF_VALUE: 16
PIF_VALUE: 0
PIF_VALUE: 36
PIF_VALUE: 0
PIF_VALUE: 23
PIF_VALUE: 24
PIF_VALUE: 3
PIF_VALUE: 11
PIF_VALUE: 24
PIF_VALUE: 25
PIF_VALUE: 24
PIF_VALUE: 25
PIF_VALUE: 28
PIF_VALUE: 20
PIF_VALUE: 23
PIF_VALUE: 23
PIF_VALUE: 2
PIF_VALUE: 23
PIF_VALUE: 37
PIF_VALUE: 20
PIF_VALUE: 1
PIF_VALUE: 23
PIF_VALUE: 11
PIF_VALUE: 13
PIF_VALUE: 1
PIF_VALUE: 0
PIF_VALUE: 10
PIF_VALUE: 0
PIF_VALUE: 30
PIF_VALUE: 23
PIF_VALUE: 20
PIF_VALUE: 22
PIF_VALUE: 0
PIF_VALUE: 24
PIF_VALUE: 13
PIF_VALUE: 24
PIF_VALUE: 3
PIF_VALUE: 24
PIF_VALUE: 23
PIF_VALUE: 20

## 2019-07-02 ASSESSMENT — PAIN SCALES - GENERAL
PAINLEVEL_OUTOF10: 8
PAINLEVEL_OUTOF10: 9
PAINLEVEL_OUTOF10: 7
PAINLEVEL_OUTOF10: 9
PAINLEVEL_OUTOF10: 5
PAINLEVEL_OUTOF10: 8
PAINLEVEL_OUTOF10: 10
PAINLEVEL_OUTOF10: 8
PAINLEVEL_OUTOF10: 8
PAINLEVEL_OUTOF10: 10
PAINLEVEL_OUTOF10: 9
PAINLEVEL_OUTOF10: 7
PAINLEVEL_OUTOF10: 7

## 2019-07-02 ASSESSMENT — PAIN DESCRIPTION - FREQUENCY: FREQUENCY: CONTINUOUS

## 2019-07-02 ASSESSMENT — PAIN DESCRIPTION - ORIENTATION
ORIENTATION: LOWER;RIGHT
ORIENTATION: RIGHT

## 2019-07-02 ASSESSMENT — PAIN DESCRIPTION - PAIN TYPE
TYPE: SURGICAL PAIN

## 2019-07-02 ASSESSMENT — PAIN DESCRIPTION - PROGRESSION
CLINICAL_PROGRESSION: GRADUALLY IMPROVING
CLINICAL_PROGRESSION: NOT CHANGED
CLINICAL_PROGRESSION: NOT CHANGED
CLINICAL_PROGRESSION: GRADUALLY IMPROVING
CLINICAL_PROGRESSION: NOT CHANGED
CLINICAL_PROGRESSION: GRADUALLY IMPROVING

## 2019-07-02 ASSESSMENT — PAIN - FUNCTIONAL ASSESSMENT
PAIN_FUNCTIONAL_ASSESSMENT: 0-10
PAIN_FUNCTIONAL_ASSESSMENT: PREVENTS OR INTERFERES WITH ALL ACTIVE AND SOME PASSIVE ACTIVITIES
PAIN_FUNCTIONAL_ASSESSMENT: PREVENTS OR INTERFERES SOME ACTIVE ACTIVITIES AND ADLS

## 2019-07-02 ASSESSMENT — PAIN DESCRIPTION - ONSET: ONSET: ON-GOING

## 2019-07-02 ASSESSMENT — PAIN DESCRIPTION - LOCATION
LOCATION: ABDOMEN

## 2019-07-02 ASSESSMENT — ENCOUNTER SYMPTOMS: SHORTNESS OF BREATH: 1

## 2019-07-02 NOTE — H&P
I have reviewed the history and physical and examined the patient. I find no relevant changes. I have reviewed with the patient and/or family members, during the preoperative office visit the risks, benefits, and alternatives to the procedure.     Ronnie Tran

## 2019-07-02 NOTE — ANESTHESIA PRE PROCEDURE
Department of Anesthesiology  Preprocedure Note       Name:  Yazan Hammonds   Age:  44 y.o.  :  1970                                          MRN:  [id number]         Date:  2019      Surgeon: Wendy Gaitan):  Raji Perea MD    Procedure: PERITONEAL DIALYSIS CATHETER REMOVAL (N/A )    Medications prior to admission:   Prior to Admission medications    Medication Sig Start Date End Date Taking? Authorizing Provider   cyclobenzaprine (FLEXERIL) 10 MG tablet TAKE (1) TABLET BY MOUTH EVERY 8 HOURS AS NEEDED 19  Yes Historical Provider, MD   vitamin D (ERGOCALCIFEROL) 00658 units CAPS capsule TK 1 C PO WEEKLY 19  Yes Historical Provider, MD   GENERLAC 10 GM/15ML SOLN solution TK 15 MLS PO TID PRF CONSTIPATION 19  Yes Historical Provider, MD   nitroGLYCERIN (NITROSTAT) 0.4 MG SL tablet Place 1 tablet under the tongue every 5 minutes as needed for Chest pain up to max of 3 total doses.  If no relief after 1 dose, call 911. 19  Yes JAY Delgado - CNP   albuterol (PROVENTIL) (2.5 MG/3ML) 0.083% nebulizer solution Take 3 mLs by nebulization every 6 hours as needed for Wheezing 19  Yes Ann Love MD   QUEtiapine (SEROQUEL) 25 MG tablet Take 1 tablet by mouth every evening 19  Yes Amor Cole MD   B Complex-C-Folic Acid (VIRT-CAPS) 1 MG CAPS TK ONE C PO  QD 19  Yes Amor Cole MD   diltiazem (CARTIA XT) 180 MG extended release capsule TAKE 1 CAPSULE EVERY DAY 19  Yes Amor Cole MD   carvedilol (COREG) 12.5 MG tablet Take 1 tablet by mouth 2 times daily (with meals) 19  Yes Amor Cole MD   citalopram (CELEXA) 40 MG tablet TAKE 1 TABLET EVERY DAY 19  Yes Amor Cole MD   insulin glargine (LANTUS) 100 UNIT/ML injection vial Inject 10-15 Units into the skin nightly 19 Yes Amor Cole MD   losartan (COZAAR) 50 MG tablet Take 1 tablet by mouth daily  Patient taking differently: Take 50 mg by mouth daily Indications: Dyslipidemia E78.5    Tobacco smoking Z72.0    Respiratory distress R06.03    Hypoxia R09.02    Chest pressure R07.89    Hypertensive urgency I16.0    Acute on chronic combined systolic and diastolic heart failure ([facility]) I50.43    Ischemic cardiomyopathy I25.5    Chronic renal failure, stage 5 ([facility]) N18.5    Tobacco abuse Z72.0    CKD stage 4 due to type 2 diabetes mellitus ([facility]) E11.22, N18.4    CVA (cerebral vascular accident) ([facility] I63.9    Arterial ischemic stroke, ICA, right, acute ([facility]) I63.231    Type 2 diabetes mellitus without complication, without long-term current use of insulin ([facility]) E11.9    HTN (hypertension), benign I10    ZAINAB (obstructive sleep apnea) G47.33    Diarrhea R19.7    Pleural effusion J90    Chronic anemia D64.9    Aortic valve stenosis I35.0    Hypervolemia E87.70    Hyperkalemia E87.5    Morbid obesity ([facility]) E66.01    Mucus plugging of bronchi J98.09    Hemodialysis-associated hypotension I95.3    Left arm pain M79.602    Dizziness R42    ESRD (end stage renal disease) on dialysis ([facility]) N18.6, Z99.2    Hypotension due to drugs I95.2    Acute diastolic CHF (congestive heart failure) ([facility]) I50.31    Neuromuscular disorder ([facility]) G70.9    Acute combined systolic and diastolic CHF, NYHA class 4 ([facility]) I50.41    Renovascular hypertension I15.0    Mixed hyperlipidemia E78.2    Cigarette nicotine dependence in remission F17.211    Peritoneal dialysis status ([facility]) Z99.2    Acute respiratory failure ([facility]) J96.00    Pulmonary edema J81.1    Fluid overload E70.06    Diastolic dysfunction X35.03    Anemia of chronic disease D63.8    SOB (shortness of breath) R06.02    Steal syndrome of dialysis vascular access ([facility]) T82.898A    Chronic, continuous use of opioids F11.90    Chronic bronchitis ([facility]) J42    Nasal congestion R09.81       Past Medical History:        Diagnosis Date    Ambulatory dysfunction     walker for long distances, SOB with distance   

## 2019-07-02 NOTE — PROGRESS NOTES
Discharge instructions explained to pt and pt family. All questions answered. Copy given to pt spouse with script sent to OP pharmacy.

## 2019-07-02 NOTE — PROGRESS NOTES
4 Eyes Skin Assessment     The patient is being assess for   Admission    I agree that 2 RN's have performed a thorough Head to Toe Skin Assessment on the patient. ALL assessment sites listed below have been assessed. Areas assessed by both nurses:   []   Head, Face, and Ears   []   Shoulders, Back, and Chest, Abdomen  []   Arms, Elbows, and Hands   []   Coccyx, Sacrum, and Ischium  []   Legs, Feet, and Heels        Skin free from breakdown    **SHARE this note so that the co-signing nurse is able to place an eSignature**    Co-signer eSignature: Electronically signed by Jermaine Skinner RN on 7/2/19 at 11:48 AM    Does the Patient have Skin Breakdown?   No          Isaac Prevention initiated:  No   Wound Care Orders initiated:  No      [facility] nurse consulted for Pressure Injury (Stage 3,4, Unstageable, DTI, NWPT, Complex wounds)and New or Established Ostomies:  No      Primary Nurse eSignature: Electronically signed by Neha Valera RN on 7/2/19 at 11:48 AM

## 2019-07-18 RX ORDER — ALBUTEROL SULFATE 2.5 MG/3ML
SOLUTION RESPIRATORY (INHALATION)
Qty: 360 ML | Refills: 5 | Status: SHIPPED | OUTPATIENT
Start: 2019-07-18 | End: 2019-12-10

## 2019-07-18 RX ORDER — TIZANIDINE 4 MG/1
TABLET ORAL
Qty: 90 TABLET | Refills: 10 | Status: ON HOLD | OUTPATIENT
Start: 2019-07-18 | End: 2019-10-23 | Stop reason: HOSPADM

## 2019-07-19 ENCOUNTER — TELEPHONE (OUTPATIENT)
Dept: CARDIOLOGY CLINIC | Age: 49
End: 2019-07-19

## 2019-07-19 NOTE — TELEPHONE ENCOUNTER
Left message asking for return call. Patient needs to schedule appointment for first available with Dr. Gibran Fuentes.

## 2019-07-19 NOTE — TELEPHONE ENCOUNTER
Pt has called the office in regards to the valve replacement information he received needing prior to a kidney transplant. Please contact pt to discuss and how he should proceed. Please contact pt at [phone]. [facility] next week, and next available is 8-28-19.

## 2019-07-20 ENCOUNTER — HOSPITAL ENCOUNTER (OUTPATIENT)
Dept: ULTRASOUND IMAGING | Age: 49
Discharge: HOME | End: 2019-07-20
Payer: COMMERCIAL

## 2019-07-20 DIAGNOSIS — R18.8 OTHER ASCITES: ICD-10-CM

## 2019-07-20 DIAGNOSIS — R06.00 DYSPNEA, UNSPECIFIED TYPE: ICD-10-CM

## 2019-07-20 PROCEDURE — 76700 US EXAM ABDOM COMPLETE: CPT

## 2019-07-29 ENCOUNTER — OFFICE VISIT (OUTPATIENT)
Dept: CARDIOLOGY CLINIC | Age: 49
End: 2019-07-29
Payer: COMMERCIAL

## 2019-07-29 VITALS
DIASTOLIC BLOOD PRESSURE: 58 MMHG | WEIGHT: 247.2 LBS | HEIGHT: 69 IN | OXYGEN SATURATION: 98 % | HEART RATE: 72 BPM | SYSTOLIC BLOOD PRESSURE: 116 MMHG | BODY MASS INDEX: 36.61 KG/M2

## 2019-07-29 DIAGNOSIS — I25.119 CORONARY ARTERY DISEASE INVOLVING NATIVE CORONARY ARTERY OF NATIVE HEART WITH ANGINA PECTORIS (HCC): Primary | Chronic | ICD-10-CM

## 2019-07-29 DIAGNOSIS — E78.5 DYSLIPIDEMIA: Chronic | ICD-10-CM

## 2019-07-29 DIAGNOSIS — I50.41 ACUTE COMBINED SYSTOLIC AND DIASTOLIC CHF, NYHA CLASS 4 (HCC): ICD-10-CM

## 2019-07-29 DIAGNOSIS — I10 HTN (HYPERTENSION), BENIGN: ICD-10-CM

## 2019-07-29 PROCEDURE — 3017F COLORECTAL CA SCREEN DOC REV: CPT | Performed by: INTERNAL MEDICINE

## 2019-07-29 PROCEDURE — 99214 OFFICE O/P EST MOD 30 MIN: CPT | Performed by: INTERNAL MEDICINE

## 2019-07-29 PROCEDURE — G8417 CALC BMI ABV UP PARAM F/U: HCPCS | Performed by: INTERNAL MEDICINE

## 2019-07-29 PROCEDURE — 1036F TOBACCO NON-USER: CPT | Performed by: INTERNAL MEDICINE

## 2019-07-29 PROCEDURE — G8427 DOCREV CUR MEDS BY ELIG CLIN: HCPCS | Performed by: INTERNAL MEDICINE

## 2019-07-29 PROCEDURE — G8598 ASA/ANTIPLAT THER USED: HCPCS | Performed by: INTERNAL MEDICINE

## 2019-07-29 NOTE — LETTER
Inhale 2 puffs into the lungs daily 5/21/19  Yes Molly Narvaez MD   buPROPion (WELLBUTRIN XL) 150 MG extended release tablet TAKE 1 TABLET BY MOUTH EVERY MORNING 4/17/19  Yes Rafael Grigsby MD   clopidogrel (PLAVIX) 75 MG tablet TAKE 1 TABLET EVERY DAY  Patient taking differently: Indications: stopped on 6/25 for surgery TAKE 1 TABLET EVERY DAY 4/9/19  Yes Erica Smith MD   pravastatin (PRAVACHOL) 80 MG tablet TAKE 1 TABLET EVERY DAY 4/9/19  Yes Erica Smith MD   isosorbide mononitrate (IMDUR) 30 MG extended release tablet TAKE 1 TABLET EVERY DAY 3/28/19  Yes JAY Hernandez CNP   DULoxetine (CYMBALTA) 30 MG extended release capsule Take 1 capsule by mouth daily 3/28/19  Yes JAY Hernandez CNP   torsemide (DEMADEX) 100 MG tablet TAKE 1 TABLET EVERY DAY 3/28/19  Yes JAY Hernandez CNP   oxyCODONE-acetaminophen (PERCOCET) 7.5-325 MG per tablet Take 1 tablet by mouth every 4 hours as needed for Pain. Kevin Gimenez Historical Provider, MD   Polyethylene Glycol 3350 GRAN  5/2/18  Yes Historical Provider, MD   albuterol sulfate  (90 Base) MCG/ACT inhaler Inhale 2 puffs into the lungs every 6 hours as needed for Wheezing 11/8/17  Yes Deboraha Osgood, MD   ipratropium-albuterol (DUONEB) 0.5-2.5 (3) MG/3ML SOLN nebulizer solution Inhale 3 mLs into the lungs every 6 hours as needed for Shortness of Breath 10/15/17  Yes Lisa Montenegro MD   calcium carbonate (TUMS) 500 MG chewable tablet Take 1 tablet by mouth 3 times daily as needed for Heartburn. Yes Historical Provider, MD   aspirin 81 MG chewable tablet Take 1 tablet by mouth daily.   Patient taking differently: Take 81 mg by mouth daily Indications: stopped on 6/25 for surgery  5/14/13  Yes Faby Winston MD   albuterol (PROVENTIL) (2.5 MG/3ML) 0.083% nebulizer solution INHALE 1 VIAL VIA NEBULIZER EVERY 6 HOURS AS NEEDED FOR WHEEZING 7/18/19   Molly Narvaez MD Barb Hill M.D. By Anthony Cunningham RN    The scribes docuementation has been prepared under my direction and personally reviewed by me in its entirety. I confirm that the note above accurately reflects all work, treatment, procedures, and medical decision making performed by me. Dr. Barb Hill MD            Thank you for allowing me to participate in the care of this individual.      Kalpesh Vernon.  Niki Quintanilla M.D., Scott He

## 2019-07-29 NOTE — PROGRESS NOTES
(01/06/2016); Upper gastrointestinal endoscopy (01/29/2017); other surgical history (02/01/2017); Dialysis fistula creation (Left, 10/30/2017); Diagnostic Cardiac Cath Lab Procedure; other surgical history (2018); other surgical history (Bilateral, 06/26/2018); pr lap insertion tunneled intraperitoneal catheter (N/A, 9/21/2018); other surgical history (Right, 09/2018); Dialysis fistula creation (Left, 3/27/2019); other surgical history (05/28/2019); Endoscopy, colon, diagnostic; and Catheter Removal (Right, 7/2/2019). Social History:   reports that he quit smoking about 7 weeks ago. His smoking use included cigarettes. He has a 3.30 pack-year smoking history. He has never used smokeless tobacco. He reports that he does not drink alcohol or use drugs. Family History:  family history includes Alcohol Abuse in his brother; Cancer in his sister and sister; Diabetes in his mother; Heart Disease in his father, sister, and sister; Kidney Disease in his sister; Obesity in his sister and sister. Home Medications:  Prior to Admission medications    Medication Sig Start Date End Date Taking? Authorizing Provider   QUEtiapine (SEROQUEL) 25 MG tablet TAKE 1 TABLET BY MOUTH EVERY EVENING 7/18/19  Yes Adrianne Oneal MD   tiZANidine (ZANAFLEX) 4 MG tablet TAKE 1 TABLET BY MOUTH THREE TIMES DAILY 7/18/19  Yes Adrianne Oneal MD   cyclobenzaprine (FLEXERIL) 10 MG tablet TAKE (1) TABLET BY MOUTH EVERY 8 HOURS AS NEEDED 5/24/19  Yes Historical Provider, MD   vitamin D (ERGOCALCIFEROL) 73374 units CAPS capsule TK 1 C PO WEEKLY 6/2/19  Yes Historical Provider, MD   GENERLAC 10 GM/15ML SOLN solution TK 15 MLS PO TID PRF CONSTIPATION 4/11/19  Yes Historical Provider, MD   nitroGLYCERIN (NITROSTAT) 0.4 MG SL tablet Place 1 tablet under the tongue every 5 minutes as needed for Chest pain up to max of 3 total doses.  If no relief after 1 dose, call 911. 6/21/19  Yes Oval Lean, APRN - CNP   albuterol (PROVENTIL) (2.5 MG/3ML) (WELLBUTRIN XL) 150 MG extended release tablet TAKE 1 TABLET BY MOUTH EVERY MORNING 4/17/19  Yes Julian Crain MD   clopidogrel (PLAVIX) 75 MG tablet TAKE 1 TABLET EVERY DAY  Patient taking differently: Indications: stopped on 6/25 for surgery TAKE 1 TABLET EVERY DAY 4/9/19  Yes Xi Alford MD   pravastatin (PRAVACHOL) 80 MG tablet TAKE 1 TABLET EVERY DAY 4/9/19  Yes Xi Alford MD   isosorbide mononitrate (IMDUR) 30 MG extended release tablet TAKE 1 TABLET EVERY DAY 3/28/19  Yes JAY Mart - CNP   DULoxetine (CYMBALTA) 30 MG extended release capsule Take 1 capsule by mouth daily 3/28/19  Yes JAY Mart CNP   torsemide (DEMADEX) 100 MG tablet TAKE 1 TABLET EVERY DAY 3/28/19  Yes JAY Mart CNP   oxyCODONE-acetaminophen (PERCOCET) 7.5-325 MG per tablet Take 1 tablet by mouth every 4 hours as needed for Pain. Keyonna Keyes Historical Provider, MD   Polyethylene Glycol 3350 GRAN  5/2/18  Yes Historical Provider, MD   albuterol sulfate  (90 Base) MCG/ACT inhaler Inhale 2 puffs into the lungs every 6 hours as needed for Wheezing 11/8/17  Yes Edd Shah MD   ipratropium-albuterol (DUONEB) 0.5-2.5 (3) MG/3ML SOLN nebulizer solution Inhale 3 mLs into the lungs every 6 hours as needed for Shortness of Breath 10/15/17  Yes Gabriel Reyes MD   calcium carbonate (TUMS) 500 MG chewable tablet Take 1 tablet by mouth 3 times daily as needed for Heartburn. Yes Historical Provider, MD   aspirin 81 MG chewable tablet Take 1 tablet by mouth daily.   Patient taking differently: Take 81 mg by mouth daily Indications: stopped on 6/25 for surgery  5/14/13  Yes Frances Peterson MD   albuterol (PROVENTIL) (2.5 MG/3ML) 0.083% nebulizer solution INHALE 1 VIAL VIA NEBULIZER EVERY 6 HOURS AS NEEDED FOR WHEEZING 7/18/19   Shay Shetty MD   Insulin Syringe-Needle U-100 30G X 1/2\" 0.5 ML MISC 1 each by Does not apply route daily 9/25/18   Adrienne Krabbe, MD   Glucose Blood (BLOOD Constitutional and General Appearance: NAD   Respiratory:  · Normal excursion and expansion without use of accessory muscles  · Resp Auscultation: Normal breath sounds without dullness  Cardiovascular:  · The apical impulses not displaced  · Heart tones are crisp and normal  · Cervical veins are not engorged  · The carotid upstroke is normal in amplitude and contour without delay. Right carotid bruit   · Normal S1S2, No S3, 2/6 murmur  · Peripheral pulses are symmetrical and full  · There is no clubbing, cyanosis of the extremities. · Trace edema  · Femoral Arteries: 2+ and equal  · Pedal Pulses: 2+ and equal   Abdomen:  · No masses or tenderness  · Liver/Spleen: No Abnormalities Noted  Neurological/Psychiatric:  · Alert and oriented in all spheres  · Moves all extremities well  · Exhibits normal gait balance and coordination  · No abnormalities of mood, affect, memory, mentation, or behavior are noted    cardiac catheterization May 2013, which showed mild nonobstructive disease. Cath 5/2015  LM <20%  LAD 70% mid. FFR 0.77  D1 50% prox  Cx 20%  RCA 20%  LVEDP 22mmHg  RA 9, RV 42/10, PA 44/16/31, W 18  PA sat 63%  PTCA LAD  3.0 x 15 PATRICIA  prox portion post 3.5 x 8 NC balloon    Myoview 6/2015  There is a very small and mild fixed posterior-basal defect consistent with   fibrosis. There is no evidence for ischemia. Left ventricular function is reduced with and estimated LVEF of 40%. The LV is severely dilated. Cardiac cath 1/23/17  LM <20%  LAD 30% w/ patent stent  Cx 20-30%  RCA 20-30%  LVEDP 19  RA 8,  RV 41/10,  PA 45/14/31,  W 15  Nonobstructive CAD  Mild elevated right heart pressures    Stress Echocardiogram 9/17/18  Baseline nonspecific ST changes. PVC's during infusion. Patient developed bilateral jaw tightness 4/10, likely related to   Dobutamine. Symptoms resolved with rest.   No EKG changes of stress induced left ventricular ischemia.    Dobutamine stress echo shows normal baseline EF at

## 2019-07-30 PROBLEM — E78.00 HYPERCHOLESTEREMIA: Status: ACTIVE | Noted: 2019-07-30

## 2019-08-06 ENCOUNTER — TELEPHONE (OUTPATIENT)
Dept: CARDIOLOGY CLINIC | Age: 49
End: 2019-08-06

## 2019-08-06 ENCOUNTER — OFFICE VISIT (OUTPATIENT)
Dept: CARDIOLOGY CLINIC | Age: 49
End: 2019-08-06
Payer: COMMERCIAL

## 2019-08-06 VITALS
SYSTOLIC BLOOD PRESSURE: 116 MMHG | WEIGHT: 243.4 LBS | OXYGEN SATURATION: 99 % | HEIGHT: 69 IN | DIASTOLIC BLOOD PRESSURE: 72 MMHG | HEART RATE: 70 BPM | BODY MASS INDEX: 36.05 KG/M2

## 2019-08-06 DIAGNOSIS — I25.10 CORONARY ARTERY DISEASE INVOLVING NATIVE CORONARY ARTERY OF NATIVE HEART WITHOUT ANGINA PECTORIS: ICD-10-CM

## 2019-08-06 DIAGNOSIS — I35.0 NONRHEUMATIC AORTIC VALVE STENOSIS: Primary | ICD-10-CM

## 2019-08-06 DIAGNOSIS — Z72.0 TOBACCO ABUSE: ICD-10-CM

## 2019-08-06 DIAGNOSIS — I10 ESSENTIAL HYPERTENSION: ICD-10-CM

## 2019-08-06 DIAGNOSIS — E78.00 HYPERCHOLESTEREMIA: ICD-10-CM

## 2019-08-06 PROCEDURE — G8417 CALC BMI ABV UP PARAM F/U: HCPCS | Performed by: INTERNAL MEDICINE

## 2019-08-06 PROCEDURE — G8598 ASA/ANTIPLAT THER USED: HCPCS | Performed by: INTERNAL MEDICINE

## 2019-08-06 PROCEDURE — 3017F COLORECTAL CA SCREEN DOC REV: CPT | Performed by: INTERNAL MEDICINE

## 2019-08-06 PROCEDURE — 99204 OFFICE O/P NEW MOD 45 MIN: CPT | Performed by: INTERNAL MEDICINE

## 2019-08-06 PROCEDURE — G8427 DOCREV CUR MEDS BY ELIG CLIN: HCPCS | Performed by: INTERNAL MEDICINE

## 2019-08-06 PROCEDURE — 1036F TOBACCO NON-USER: CPT | Performed by: INTERNAL MEDICINE

## 2019-08-06 NOTE — TELEPHONE ENCOUNTER
Patient was seen in office today with Dr. Isela Bassett. Patient is scheduled with Dr. Rustam Bae for Left Heart Cath (TAVR) on 8/22/19 at [facility], arrival time of 8:30am to the Cath Lab. Please have patient arrive to the main entrance of [facility] at 6:15am and check in with the registration desk. He will have lab work 1st for CTA Chest, ABD & Pelvis (scheduled @ 7:30am). Please put order in chart for BMP or Creatinine/BUN. Please call patient regarding medication instructions. Remind patient to be NPO after midnight (8 hours prior). Do not apply lotions/creams on skin the day of procedure. I will mail instructions after review. Laith Adkins.

## 2019-08-07 DIAGNOSIS — I50.43 ACUTE ON CHRONIC COMBINED SYSTOLIC AND DIASTOLIC CONGESTIVE HEART FAILURE (HCC): Primary | ICD-10-CM

## 2019-08-15 DIAGNOSIS — K59.04 CHRONIC IDIOPATHIC CONSTIPATION: ICD-10-CM

## 2019-08-15 RX ORDER — LINACLOTIDE 145 UG/1
CAPSULE, GELATIN COATED ORAL
Qty: 30 CAPSULE | Refills: 10 | Status: ON HOLD | OUTPATIENT
Start: 2019-08-15 | End: 2019-10-23 | Stop reason: HOSPADM

## 2019-08-16 ENCOUNTER — TELEPHONE (OUTPATIENT)
Dept: FAMILY MEDICINE CLINIC | Age: 49
End: 2019-08-16

## 2019-08-19 RX ORDER — BLOOD-GLUCOSE METER
1 KIT MISCELLANEOUS DAILY
Qty: 1 KIT | Refills: 0 | Status: ON HOLD | OUTPATIENT
Start: 2019-08-19 | End: 2022-01-17 | Stop reason: HOSPADM

## 2019-08-19 RX ORDER — LANCETS
EACH MISCELLANEOUS
Qty: 300 EACH | Refills: 3 | Status: SHIPPED | OUTPATIENT
Start: 2019-08-19 | End: 2020-10-12

## 2019-08-21 ENCOUNTER — OFFICE VISIT (OUTPATIENT)
Dept: FAMILY MEDICINE CLINIC | Age: 49
End: 2019-08-21
Payer: COMMERCIAL

## 2019-08-21 VITALS
OXYGEN SATURATION: 99 % | DIASTOLIC BLOOD PRESSURE: 60 MMHG | WEIGHT: 243 LBS | HEART RATE: 75 BPM | SYSTOLIC BLOOD PRESSURE: 130 MMHG | BODY MASS INDEX: 35.88 KG/M2

## 2019-08-21 DIAGNOSIS — E11.51 TYPE 2 DIABETES MELLITUS WITH DIABETIC PERIPHERAL ANGIOPATHY WITHOUT GANGRENE, WITH LONG-TERM CURRENT USE OF INSULIN (HCC): Primary | ICD-10-CM

## 2019-08-21 DIAGNOSIS — Z79.4 TYPE 2 DIABETES MELLITUS WITH DIABETIC PERIPHERAL ANGIOPATHY WITHOUT GANGRENE, WITH LONG-TERM CURRENT USE OF INSULIN (HCC): Primary | ICD-10-CM

## 2019-08-21 LAB — HBA1C MFR BLD: 8 %

## 2019-08-21 PROCEDURE — 83036 HEMOGLOBIN GLYCOSYLATED A1C: CPT | Performed by: FAMILY MEDICINE

## 2019-08-21 PROCEDURE — 3017F COLORECTAL CA SCREEN DOC REV: CPT | Performed by: FAMILY MEDICINE

## 2019-08-21 PROCEDURE — 1036F TOBACCO NON-USER: CPT | Performed by: FAMILY MEDICINE

## 2019-08-21 PROCEDURE — G8417 CALC BMI ABV UP PARAM F/U: HCPCS | Performed by: FAMILY MEDICINE

## 2019-08-21 PROCEDURE — 2022F DILAT RTA XM EVC RTNOPTHY: CPT | Performed by: FAMILY MEDICINE

## 2019-08-21 PROCEDURE — 3045F PR MOST RECENT HEMOGLOBIN A1C LEVEL 7.0-9.0%: CPT | Performed by: FAMILY MEDICINE

## 2019-08-21 PROCEDURE — G8598 ASA/ANTIPLAT THER USED: HCPCS | Performed by: FAMILY MEDICINE

## 2019-08-21 PROCEDURE — 99214 OFFICE O/P EST MOD 30 MIN: CPT | Performed by: FAMILY MEDICINE

## 2019-08-21 PROCEDURE — G8427 DOCREV CUR MEDS BY ELIG CLIN: HCPCS | Performed by: FAMILY MEDICINE

## 2019-08-22 ENCOUNTER — HOSPITAL ENCOUNTER (OUTPATIENT)
Dept: CT IMAGING | Age: 49
Discharge: HOME | End: 2019-08-22
Attending: INTERNAL MEDICINE
Payer: COMMERCIAL

## 2019-08-22 ENCOUNTER — HOSPITAL ENCOUNTER (OUTPATIENT)
Dept: CARDIAC CATH/INVASIVE PROCEDURES | Age: 49
Discharge: HOME | End: 2019-08-22
Attending: INTERNAL MEDICINE | Admitting: INTERNAL MEDICINE
Payer: COMMERCIAL

## 2019-08-22 VITALS — BODY MASS INDEX: 35.99 KG/M2 | WEIGHT: 243 LBS | HEIGHT: 69 IN

## 2019-08-22 DIAGNOSIS — I35.0 NONRHEUMATIC AORTIC VALVE STENOSIS: ICD-10-CM

## 2019-08-22 LAB
ANION GAP SERPL CALCULATED.3IONS-SCNC: 13 MMOL/L (ref 3–16)
BUN BLDV-MCNC: 41 MG/DL (ref 7–20)
CALCIUM SERPL-MCNC: 9.6 MG/DL (ref 8.3–10.6)
CHLORIDE BLD-SCNC: 95 MMOL/L (ref 99–110)
CHOLESTEROL, TOTAL: 170 MG/DL (ref 0–199)
CO2: 27 MMOL/L (ref 21–32)
CREAT SERPL-MCNC: 4.2 MG/DL (ref 0.9–1.3)
EKG ATRIAL RATE: 67 BPM
EKG DIAGNOSIS: NORMAL
EKG P AXIS: 67 DEGREES
EKG P-R INTERVAL: 190 MS
EKG Q-T INTERVAL: 468 MS
EKG QRS DURATION: 94 MS
EKG QTC CALCULATION (BAZETT): 494 MS
EKG R AXIS: -1 DEGREES
EKG T AXIS: 74 DEGREES
EKG VENTRICULAR RATE: 67 BPM
GFR AFRICAN AMERICAN: 18
GFR NON-AFRICAN AMERICAN: 15
GLUCOSE BLD-MCNC: 226 MG/DL (ref 70–99)
HCT VFR BLD CALC: 32.3 % (ref 40.5–52.5)
HDLC SERPL-MCNC: 49 MG/DL (ref 40–60)
HEMOGLOBIN: 11.1 G/DL (ref 13.5–17.5)
INR BLD: 0.98 (ref 0.86–1.14)
LDL CHOLESTEROL CALCULATED: 75 MG/DL
MCH RBC QN AUTO: 33.3 PG (ref 26–34)
MCHC RBC AUTO-ENTMCNC: 34.2 G/DL (ref 31–36)
MCV RBC AUTO: 97.4 FL (ref 80–100)
PDW BLD-RTO: 15 % (ref 12.4–15.4)
PLATELET # BLD: 283 K/UL (ref 135–450)
PMV BLD AUTO: 7.8 FL (ref 5–10.5)
POTASSIUM SERPL-SCNC: 4 MMOL/L (ref 3.5–5.1)
PROTHROMBIN TIME: 11.2 SEC (ref 9.8–13)
RBC # BLD: 3.32 M/UL (ref 4.2–5.9)
SODIUM BLD-SCNC: 135 MMOL/L (ref 136–145)
TRIGL SERPL-MCNC: 231 MG/DL (ref 0–150)
VLDLC SERPL CALC-MCNC: 46 MG/DL
WBC # BLD: 9 K/UL (ref 4–11)

## 2019-08-22 PROCEDURE — 2580000003 HC RX 258

## 2019-08-22 PROCEDURE — 93005 ELECTROCARDIOGRAM TRACING: CPT | Performed by: INTERNAL MEDICINE

## 2019-08-22 PROCEDURE — 85610 PROTHROMBIN TIME: CPT

## 2019-08-22 PROCEDURE — C1894 INTRO/SHEATH, NON-LASER: HCPCS

## 2019-08-22 PROCEDURE — 93010 ELECTROCARDIOGRAM REPORT: CPT | Performed by: INTERNAL MEDICINE

## 2019-08-22 PROCEDURE — 2709999900 HC NON-CHARGEABLE SUPPLY

## 2019-08-22 PROCEDURE — 6370000000 HC RX 637 (ALT 250 FOR IP)

## 2019-08-22 PROCEDURE — 93454 CORONARY ARTERY ANGIO S&I: CPT | Performed by: INTERNAL MEDICINE

## 2019-08-22 PROCEDURE — C1887 CATHETER, GUIDING: HCPCS

## 2019-08-22 PROCEDURE — 6360000004 HC RX CONTRAST MEDICATION: Performed by: INTERNAL MEDICINE

## 2019-08-22 PROCEDURE — 2500000003 HC RX 250 WO HCPCS

## 2019-08-22 PROCEDURE — 74174 CTA ABD&PLVS W/CONTRAST: CPT

## 2019-08-22 PROCEDURE — 99152 MOD SED SAME PHYS/QHP 5/>YRS: CPT

## 2019-08-22 PROCEDURE — 93454 CORONARY ARTERY ANGIO S&I: CPT

## 2019-08-22 PROCEDURE — 6360000004 HC RX CONTRAST MEDICATION

## 2019-08-22 PROCEDURE — C1769 GUIDE WIRE: HCPCS

## 2019-08-22 PROCEDURE — 99152 MOD SED SAME PHYS/QHP 5/>YRS: CPT | Performed by: INTERNAL MEDICINE

## 2019-08-22 PROCEDURE — 85027 COMPLETE CBC AUTOMATED: CPT

## 2019-08-22 PROCEDURE — 6360000002 HC RX W HCPCS

## 2019-08-22 PROCEDURE — 99153 MOD SED SAME PHYS/QHP EA: CPT

## 2019-08-22 PROCEDURE — 80048 BASIC METABOLIC PNL TOTAL CA: CPT

## 2019-08-22 PROCEDURE — 80061 LIPID PANEL: CPT

## 2019-08-22 RX ORDER — OXYCODONE AND ACETAMINOPHEN 7.5; 325 MG/1; MG/1
1 TABLET ORAL EVERY 4 HOURS PRN
Status: DISCONTINUED | OUTPATIENT
Start: 2019-08-22 | End: 2019-08-22 | Stop reason: HOSPADM

## 2019-08-22 RX ORDER — SODIUM CHLORIDE 0.9 % (FLUSH) 0.9 %
10 SYRINGE (ML) INJECTION EVERY 12 HOURS SCHEDULED
Status: DISCONTINUED | OUTPATIENT
Start: 2019-08-22 | End: 2019-08-22 | Stop reason: HOSPADM

## 2019-08-22 RX ORDER — ACETAMINOPHEN 325 MG/1
650 TABLET ORAL EVERY 4 HOURS PRN
Status: DISCONTINUED | OUTPATIENT
Start: 2019-08-22 | End: 2019-08-22 | Stop reason: HOSPADM

## 2019-08-22 RX ORDER — CLOPIDOGREL BISULFATE 75 MG/1
75 TABLET ORAL ONCE
Status: COMPLETED | OUTPATIENT
Start: 2019-08-22 | End: 2019-08-22

## 2019-08-22 RX ORDER — MIDAZOLAM HYDROCHLORIDE 5 MG/ML
INJECTION INTRAMUSCULAR; INTRAVENOUS
Status: COMPLETED | OUTPATIENT
Start: 2019-08-22 | End: 2019-08-22

## 2019-08-22 RX ORDER — ONDANSETRON 2 MG/ML
4 INJECTION INTRAMUSCULAR; INTRAVENOUS EVERY 6 HOURS PRN
Status: DISCONTINUED | OUTPATIENT
Start: 2019-08-22 | End: 2019-08-22 | Stop reason: HOSPADM

## 2019-08-22 RX ORDER — FENTANYL CITRATE 50 UG/ML
INJECTION, SOLUTION INTRAMUSCULAR; INTRAVENOUS
Status: COMPLETED | OUTPATIENT
Start: 2019-08-22 | End: 2019-08-22

## 2019-08-22 RX ORDER — SODIUM CHLORIDE 9 MG/ML
INJECTION, SOLUTION INTRAVENOUS CONTINUOUS
Status: DISCONTINUED | OUTPATIENT
Start: 2019-08-22 | End: 2019-08-22 | Stop reason: HOSPADM

## 2019-08-22 RX ORDER — ASPIRIN 81 MG/1
81 TABLET, CHEWABLE ORAL ONCE
Status: COMPLETED | OUTPATIENT
Start: 2019-08-22 | End: 2019-08-22

## 2019-08-22 RX ORDER — SODIUM CHLORIDE 0.9 % (FLUSH) 0.9 %
10 SYRINGE (ML) INJECTION PRN
Status: DISCONTINUED | OUTPATIENT
Start: 2019-08-22 | End: 2019-08-22 | Stop reason: HOSPADM

## 2019-08-22 RX ADMIN — IOPAMIDOL 150 ML: 755 INJECTION, SOLUTION INTRAVENOUS at 08:26

## 2019-08-22 RX ADMIN — MIDAZOLAM HYDROCHLORIDE 1 MG: 5 INJECTION INTRAMUSCULAR; INTRAVENOUS at 11:54

## 2019-08-22 RX ADMIN — MIDAZOLAM HYDROCHLORIDE 2 MG: 5 INJECTION INTRAMUSCULAR; INTRAVENOUS at 11:39

## 2019-08-22 RX ADMIN — OXYCODONE AND ACETAMINOPHEN 1 TABLET: 7.5; 325 TABLET ORAL at 13:57

## 2019-08-22 RX ADMIN — ASPIRIN 81 MG: 81 TABLET, CHEWABLE ORAL at 07:29

## 2019-08-22 RX ADMIN — FENTANYL CITRATE 50 MCG: 50 INJECTION, SOLUTION INTRAMUSCULAR; INTRAVENOUS at 11:54

## 2019-08-22 RX ADMIN — FENTANYL CITRATE 50 MCG: 50 INJECTION, SOLUTION INTRAMUSCULAR; INTRAVENOUS at 11:38

## 2019-08-22 RX ADMIN — FENTANYL CITRATE 50 MCG: 50 INJECTION, SOLUTION INTRAMUSCULAR; INTRAVENOUS at 11:47

## 2019-08-22 RX ADMIN — MIDAZOLAM HYDROCHLORIDE 1 MG: 5 INJECTION INTRAMUSCULAR; INTRAVENOUS at 11:41

## 2019-08-22 RX ADMIN — CLOPIDOGREL BISULFATE 75 MG: 75 TABLET ORAL at 07:29

## 2019-08-22 RX ADMIN — SODIUM CHLORIDE: 9 INJECTION, SOLUTION INTRAVENOUS at 07:29

## 2019-08-22 RX ADMIN — OXYCODONE AND ACETAMINOPHEN 1 TABLET: 7.5; 325 TABLET ORAL at 09:36

## 2019-08-22 ASSESSMENT — PAIN SCALES - GENERAL
PAINLEVEL_OUTOF10: 10
PAINLEVEL_OUTOF10: 7

## 2019-08-22 NOTE — H&P
[facility]   Cardiac Followup     Referring Provider:  Celia Arevalo MD           Chief Complaint   Patient presents with    Follow-up    Chest Pain       pressure once in awhile         History of Present Illness:  Mr. Jeovany Beltran is here for follow up for nonobstructive CAD, AS, HTN, and preoperative cardiac evaluation for kidney transplant. Today he states he has been feeling well overall. He has chest pain that occurs with walking short distances. The pain feels like a pressure that resolves when he stops and rest. He has chest pressure at rest and when he lays down. He has associated SOB. His chest pain radiates into his left arm and back. He attends HD 3 days a week. Patient currently denies any weight gain, edema, palpitations, dizziness, and syncope.           Past Medical History:   has a past medical history of Ambulatory dysfunction, Aortic stenosis, Arthritis, Asthma, Bilateral hilar adenopathy syndrome, CAD (coronary artery disease), Cardiomyopathy ([address] CHF (congestive heart failure) ([address] COPD (chronic obstructive pulmonary disease) ([address] Depression, Diabetes mellitus ([address] Difficult intravenous access, Emphysema of lung ([address] ESRD on peritoneal dialysis ([address] Fear of needles, Gastric ulcer, GERD (gastroesophageal reflux disease), Heart valve problem, Hemodialysis patient ([address] Hx of blood clots, Hyperlipidemia, Hypertension, MI (myocardial infarction) ([address] Neuromuscular disorder ([address] Numbness and tingling in left arm, Pneumonia, PONV (postoperative nausea and vomiting), Prolonged emergence from general anesthesia, Sleep apnea, Stroke ([address] TIA (transient ischemic attack), and Unspecified diseases of blood and blood-forming organs.     Surgical History:   has a past surgical history that includes Tonsillectomy; cyst removal (8-14-13); Colonoscopy; Coronary angioplasty with stent (05/26/15); vascular surgery (aprx 2 years ago); Colonoscopy (2/29/2015);  Upper gastrointestinal albuterol (PROVENTIL) (2.5 MG/3ML) 0.083% nebulizer solution Take 3 mLs by nebulization every 6 hours as needed for Wheezing 6/20/19   Yes Sakina Loyd MD   linaclotide [facility]) 145 MCG capsule Take 1 capsule by mouth every morning (before breakfast) 6/11/19   Yes Jose Almanzar MD   B Complex-C-Folic Acid (VIRT-CAPS) 1 MG CAPS TK ONE C PO  QD 5/30/19   Yes Jose Almanzar MD   diltiazem (CARTIA XT) 180 MG extended release capsule TAKE 1 CAPSULE EVERY DAY 5/30/19   Yes Jose Almanzar MD   carvedilol (COREG) 12.5 MG tablet Take 1 tablet by mouth 2 times daily (with meals) 5/30/19   Yes Jose Almanzar MD   citalopram (CELEXA) 40 MG tablet TAKE 1 TABLET EVERY DAY 5/30/19   Yes Jose Almanzar MD   insulin glargine (LANTUS) 100 UNIT/ML injection vial Inject 10-15 Units into the skin nightly 5/30/19 7/29/19 Yes Joes Almanzar MD   losartan (COZAAR) 50 MG tablet Take 1 tablet by mouth daily  Patient taking differently: Take 50 mg by mouth daily Indications: stopped on 6/25 for surgery  5/30/19   Yes Jose Almanzar MD   Pediatric Multivitamins-Fl (MULTI VIT/FL) 0.25 MG CHEW Take 25 mg by mouth daily 5/30/19   Yes Jose Almanzar MD   pantoprazole (PROTONIX) 40 MG tablet Take 1 tablet by mouth every morning (before breakfast) 5/30/19   Yes Jose Amlanzar MD   metaxalone (SKELAXIN) 800 MG tablet TK ONE T PO Q 8 H PRN 5/30/19   Yes Jose Almanzar MD   traZODone (DESYREL) 150 MG tablet TAKE 1 TABLET EVERY NIGHT 5/30/19   Yes Jose Almanzar MD   hydrOXYzine (VISTARIL) 50 MG capsule Take 1-2 capsules nightly 5/30/19   Yes Jose Almanzar MD   gabapentin (NEURONTIN) 100 MG capsule Take 1-2 capsules by mouth 3 times daily for 90 days.  Intended supply: 30 days 5/30/19 8/28/19 Yes Jose Almanzar MD   flunisolide (NASALIDE) 25 MCG/ACT (0.025%) SOLN Inhale 2 sprays into the lungs every 12 hours 5/22/19   Yes Jeanne Zambrano MD   Tiotropium Bromide-Olodaterol (STIOLTO RESPIMAT) 2.5-2.5 MCG/ACT AERS Inhale 2 puffs into the lungs daily (grade 2 diastolic dysfunction). The stroke volume is    118ml. The stroke index is 54ml/m^2. - Aortic valve: Marked diffuse thickening and calcification.    Transvalvular velocity is increased. There is severe stenosis.    There is mild to moderate regurgitation. The mean systolic    gradient is 88HR Hg. The peak systolic gradient is 61SH Hg. The    LVOT to aortic valve VTI ratio is 0.22. The valve area by the    velocity-time integral method is 1.0cm^2. The valve area index by    the velocity-time integral method is 0.46cm^2/m^2. - Mitral valve: The annulus is moderately calcified. - Inferior vena cava: The vessel was normal in size; the    respirophasic diameter changes were in the normal range (&gt;= 50%);    findings are consistent with normal central venous pressure.     Assessment:   1. Coronary disease- nonobstructive by cath 1/23/17  2. Chronic systolic and diastolic congestive heart failure. He reportedly had an  ejection fraction of 40% in 2013 at the time of his cardiac catheterization. He has subsequently had at least 2 echocardiograms, most recently 3/2018, which showed an ejection fraction of 55% to 60%. 3. Aortic stenosis- severe by echo 7/1/19    4. Left ventricular hypertrophy. 5. Diabetes. 6. Hypertension. 7. Chronic obstructive pulmonary disease. 8. Peripheral vascular disease. He is status post stents to his lower extremities. 9. Sleep apnea   10. Preoperative cardiac evaluation for kidney transplant    11.  Chest pain - suspect due to AS  12. Leg pain - possible claudication  Pre-op kidney txp - delay until AS addressed         Plan:  Referral to the TAVR clinic next Tuesday with Dr. Frankie Siu   Continue current medications   Check blood pressure at home weekly  Regular exercise and following a healthy diet encouraged         Scribe's attestation:  This note was scribed in the presence of Dr. Lara Glynn M.D. By Sharla Scott RN     The scribes docuementation has been prepared under my direction and personally reviewed by me in its entirety.  I confirm that the note above accurately reflects all work, treatment, procedures, and medical decision making performed by me.    Dr. Mark Cisse MD                 Thank you for allowing me to participate in the care of this individual.        Cecilia Arora.  Christen Matos M.D., Bakari Pina    Addendum  No change to above  Presents for cath pre TAVR
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
## 2019-08-27 ENCOUNTER — OFFICE VISIT (OUTPATIENT)
Dept: PULMONOLOGY | Age: 49
End: 2019-08-27
Payer: COMMERCIAL

## 2019-08-27 VITALS
HEIGHT: 69 IN | OXYGEN SATURATION: 97 % | SYSTOLIC BLOOD PRESSURE: 163 MMHG | HEART RATE: 83 BPM | WEIGHT: 243 LBS | RESPIRATION RATE: 16 BRPM | BODY MASS INDEX: 35.99 KG/M2 | DIASTOLIC BLOOD PRESSURE: 83 MMHG

## 2019-08-27 DIAGNOSIS — Z99.89 OSA ON CPAP: Primary | Chronic | ICD-10-CM

## 2019-08-27 DIAGNOSIS — I35.0 AORTIC VALVE STENOSIS, ETIOLOGY OF CARDIAC VALVE DISEASE UNSPECIFIED: ICD-10-CM

## 2019-08-27 DIAGNOSIS — J41.0 SIMPLE CHRONIC BRONCHITIS (HCC): ICD-10-CM

## 2019-08-27 DIAGNOSIS — G47.33 OSA ON CPAP: Primary | Chronic | ICD-10-CM

## 2019-08-27 DIAGNOSIS — E66.9 OBESITY (BMI 30-39.9): ICD-10-CM

## 2019-08-27 PROCEDURE — G8598 ASA/ANTIPLAT THER USED: HCPCS | Performed by: INTERNAL MEDICINE

## 2019-08-27 PROCEDURE — 1036F TOBACCO NON-USER: CPT | Performed by: INTERNAL MEDICINE

## 2019-08-27 PROCEDURE — 3023F SPIROM DOC REV: CPT | Performed by: INTERNAL MEDICINE

## 2019-08-27 PROCEDURE — 99214 OFFICE O/P EST MOD 30 MIN: CPT | Performed by: INTERNAL MEDICINE

## 2019-08-27 PROCEDURE — 3017F COLORECTAL CA SCREEN DOC REV: CPT | Performed by: INTERNAL MEDICINE

## 2019-08-27 PROCEDURE — G8417 CALC BMI ABV UP PARAM F/U: HCPCS | Performed by: INTERNAL MEDICINE

## 2019-08-27 PROCEDURE — G8926 SPIRO NO PERF OR DOC: HCPCS | Performed by: INTERNAL MEDICINE

## 2019-08-27 PROCEDURE — G8427 DOCREV CUR MEDS BY ELIG CLIN: HCPCS | Performed by: INTERNAL MEDICINE

## 2019-08-27 ASSESSMENT — SLEEP AND FATIGUE QUESTIONNAIRES
HOW LIKELY ARE YOU TO NOD OFF OR FALL ASLEEP WHILE SITTING QUIETLY AFTER LUNCH WITHOUT ALCOHOL: 2
HOW LIKELY ARE YOU TO NOD OFF OR FALL ASLEEP WHEN YOU ARE A PASSENGER IN A CAR FOR AN HOUR WITHOUT A BREAK: 3
HOW LIKELY ARE YOU TO NOD OFF OR FALL ASLEEP IN A CAR, WHILE STOPPED FOR A FEW MINUTES IN TRAFFIC: 0
HOW LIKELY ARE YOU TO NOD OFF OR FALL ASLEEP WHILE SITTING AND TALKING TO SOMEONE: 0
HOW LIKELY ARE YOU TO NOD OFF OR FALL ASLEEP WHILE WATCHING TV: 0
HOW LIKELY ARE YOU TO NOD OFF OR FALL ASLEEP WHILE SITTING INACTIVE IN A PUBLIC PLACE: 0
ESS TOTAL SCORE: 8
HOW LIKELY ARE YOU TO NOD OFF OR FALL ASLEEP WHILE SITTING AND READING: 0
HOW LIKELY ARE YOU TO NOD OFF OR FALL ASLEEP WHILE LYING DOWN TO REST IN THE AFTERNOON WHEN CIRCUMSTANCES PERMIT: 3

## 2019-08-27 NOTE — PROGRESS NOTES
MA Communication: The following orders are received by verbal communication from Karthikeyan Koenig MD    Orders include:   Anoro samples given to patient       3 mo fu scheduled 11/26/19
is identified in the abdomen or pelvis       Cardiac cath-Procedures: Cor angio, sedation     LM <20%  LAD patent stent  Cx <20%  RCA 30%     Severe AS by echo  Proceed w/ TAVR  Assessment:    1. Simple chronic bronchitis ([address]      2. ZAINAB on CPAP      3. Nasal congestion      4. ESRD (end stage renal disease) on dialysis ([address]      5. Obesity (BMI 30-39.9)      6. SOB (shortness of breath)      7.  Tobacco abuse          Plan:   · Patient was told about the clinical findings and auscultatory findings along with implications  · Patient's PFT from 2016 Was Reviewed along with Interpretation, Implications  · Patient was shown the CT scan of the chest which was done as a part of the TAVR evaluation and the findings along with differential diagnosis and implications discussed and patient does not have any acute cardiopulmonary disease of concern  · Patient is to be more careful about keeping the CPAP mask intact and patient was told about measures how to decrease the blistering of the skin  · Patient has had improvement in his symptoms with Stiolto Respimat which he was getting as samples but unfortunately I do not have any Stiolto in the office today as samples for that reason patient was given an oral for a few weeks time and patient was told to call for a few days to see if we have samples of Stiolto or if the Anoro is working you to go to than we can give [employer]  · Patient was told to take albuterol inhaler 2 puffs every 6 on whenever necessary basis and patient was taught how to take it properly and patient exhibits understanding  · Patient was given some saline nasal rinse from the office and was told to take twice a day  · Patient was also given Flonase nasal spray 2 puffs each nostril once a day and was told how to take it properly in order to avoid epistaxis  · Patient to continue dialysis as per nephrology will need to determine his optimal dry weight-patient has gained a lot of weight and patient needs

## 2019-08-29 NOTE — PROGRESS NOTES
(05/26/15); vascular surgery (aprx 2 years ago); Colonoscopy (2/29/2015); Upper gastrointestinal endoscopy (01/06/2016); Upper gastrointestinal endoscopy (01/29/2017); other surgical history (02/01/2017); Dialysis fistula creation (Left, 10/30/2017); Diagnostic Cardiac Cath Lab Procedure; other surgical history (2018); other surgical history (Bilateral, 06/26/2018); pr lap insertion tunneled intraperitoneal catheter (N/A, 9/21/2018); other surgical history (Right, 09/2018); Dialysis fistula creation (Left, 3/27/2019); other surgical history (05/28/2019); Endoscopy, colon, diagnostic; and Catheter Removal (Right, 7/2/2019). SocHx:   reports that he quit smoking about 2 months ago. His smoking use included cigarettes. He has a 3.30 pack-year smoking history. He has never used smokeless tobacco. He reports that he does not drink alcohol or use drugs. FamHx: family history includes Alcohol Abuse in his brother; Cancer in his sister and sister; Diabetes in his mother; Heart Disease in his father, sister, and sister; Kidney Disease in his sister; Obesity in his sister and sister. Allergies: Morphine   ROS:  [x]Full ROS obtained and negative except as mentioned in HPI     MEDICATIONS      Current Outpatient Medications   Medication Sig Dispense Refill    umeclidinium-vilanterol (ANORO ELLIPTA) 62.5-25 MCG/INH AEPB inhaler Inhale 1 puff into the lungs daily 1 each 0    ACCU-CHEK FASTCLIX LANCETS MISC TEST 3-4 TIMES DAILY, AS DIRECTED 300 each 3    blood glucose test strips (FREESTYLE LITE) strip Daily As needed.  100 strip 3    glucose monitoring kit (FREESTYLE) monitoring kit 1 kit by Does not apply route daily 1 kit 0    LINZESS 145 MCG capsule TAKE 1 CAPSULE BY MOUTH EVERY MORNING (BEFORE BREAKFAST) 30 capsule 10    QUEtiapine (SEROQUEL) 25 MG tablet TAKE 1 TABLET BY MOUTH EVERY EVENING 30 tablet 5    tiZANidine (ZANAFLEX) 4 MG tablet TAKE 1 TABLET BY MOUTH THREE TIMES DAILY 90 tablet 10    albuterol MCG/ACT (0.025%) SOLN Inhale 2 sprays into the lungs every 12 hours 1 Bottle 5    Tiotropium Bromide-Olodaterol (STIOLTO RESPIMAT) 2.5-2.5 MCG/ACT AERS Inhale 2 puffs into the lungs daily 2 Inhaler 0    buPROPion (WELLBUTRIN XL) 150 MG extended release tablet TAKE 1 TABLET BY MOUTH EVERY MORNING 90 tablet 3    clopidogrel (PLAVIX) 75 MG tablet TAKE 1 TABLET EVERY DAY (Patient taking differently: Indications: stopped on 6/25 for surgery TAKE 1 TABLET EVERY DAY) 90 tablet 1    pravastatin (PRAVACHOL) 80 MG tablet TAKE 1 TABLET EVERY DAY 90 tablet 1    isosorbide mononitrate (IMDUR) 30 MG extended release tablet TAKE 1 TABLET EVERY DAY 90 tablet 1    DULoxetine (CYMBALTA) 30 MG extended release capsule Take 1 capsule by mouth daily 90 capsule 5    torsemide (DEMADEX) 100 MG tablet TAKE 1 TABLET EVERY DAY 90 tablet 1    Insulin Syringe-Needle U-100 30G X 1/2\" 0.5 ML MISC 1 each by Does not apply route daily 100 each 3    oxyCODONE-acetaminophen (PERCOCET) 7.5-325 MG per tablet Take 1 tablet by mouth every 4 hours as needed for Pain. Loralyn Rattler Polyethylene Glycol 3350 GRAN       Glucose Blood (BLOOD GLUCOSE TEST STRIPS) STRP TEST 3-4 TIMES DAILY, AS DIRECTED 100 strip 3    Blood Glucose Monitoring Suppl ADAM USE AS DIRECTED. 1 Device 0    Alcohol Swabs PADS USE AS DIRECTED 300 each 3    albuterol sulfate  (90 Base) MCG/ACT inhaler Inhale 2 puffs into the lungs every 6 hours as needed for Wheezing 1 Inhaler 3    ipratropium-albuterol (DUONEB) 0.5-2.5 (3) MG/3ML SOLN nebulizer solution Inhale 3 mLs into the lungs every 6 hours as needed for Shortness of Breath 360 mL 1    Lancets MISC Test daily 100 each 3    calcium carbonate (TUMS) 500 MG chewable tablet Take 1 tablet by mouth 3 times daily as needed for Heartburn.  aspirin 81 MG chewable tablet Take 1 tablet by mouth daily.  (Patient taking differently: Take 81 mg by mouth daily Indications: stopped on 6/25 for surgery ) 30 tablet 2     No doses above   ~Hyperchol  Goal LDL <70   Counseling Counseled on diet, exercise and ideal body weight   Plan PCP liver/lipid surveillance, continue current meds at doses above   ~ESRD  Catheter based dialysis at this time  Very eager for txp however quit smoking only 3 months ago  ~Tobacco  Status Quit several months ago   Counseling Counseled on risks, cessation therapies discussed   PFTs 2018: FEV1 39%   Plan Continued avoidance of first and second hand smoke   ~Followup  Interval: After meeting with [address] [place] [address], Gena Avila, am scribing for and in the presence of Patricia Sanford MD.   Signed, Gena Avila 08/29/19 4:02 PM   Provider Felisa Jackson is working as a scribe for and in the presence of me (Patricia Sanford MD). Working as a scribe, Gena Avila may have prepopulated components of this note with my historical  intellectual property under my direct supervision. Any additions to this intellectual property were performed in my presence and at my direction.   Furthermore, the content and accuracy of this note have been reviewed by me Patricia Sanford MD).  9/3/2019 7:52 AM

## 2019-09-03 ENCOUNTER — OFFICE VISIT (OUTPATIENT)
Dept: CARDIOLOGY CLINIC | Age: 49
End: 2019-09-03
Payer: COMMERCIAL

## 2019-09-03 VITALS
SYSTOLIC BLOOD PRESSURE: 140 MMHG | WEIGHT: 251 LBS | BODY MASS INDEX: 37.18 KG/M2 | OXYGEN SATURATION: 97 % | HEART RATE: 78 BPM | DIASTOLIC BLOOD PRESSURE: 44 MMHG | HEIGHT: 69 IN

## 2019-09-03 DIAGNOSIS — I25.10 CORONARY ARTERY DISEASE INVOLVING NATIVE CORONARY ARTERY OF NATIVE HEART WITHOUT ANGINA PECTORIS: ICD-10-CM

## 2019-09-03 DIAGNOSIS — I35.0 NONRHEUMATIC AORTIC VALVE STENOSIS: Primary | ICD-10-CM

## 2019-09-03 DIAGNOSIS — E78.00 HYPERCHOLESTEREMIA: ICD-10-CM

## 2019-09-03 DIAGNOSIS — I10 ESSENTIAL HYPERTENSION: ICD-10-CM

## 2019-09-03 DIAGNOSIS — Z72.0 TOBACCO ABUSE: ICD-10-CM

## 2019-09-03 PROCEDURE — 99214 OFFICE O/P EST MOD 30 MIN: CPT | Performed by: INTERNAL MEDICINE

## 2019-09-03 PROCEDURE — G8417 CALC BMI ABV UP PARAM F/U: HCPCS | Performed by: INTERNAL MEDICINE

## 2019-09-03 PROCEDURE — 1036F TOBACCO NON-USER: CPT | Performed by: INTERNAL MEDICINE

## 2019-09-03 PROCEDURE — 3017F COLORECTAL CA SCREEN DOC REV: CPT | Performed by: INTERNAL MEDICINE

## 2019-09-03 PROCEDURE — G8427 DOCREV CUR MEDS BY ELIG CLIN: HCPCS | Performed by: INTERNAL MEDICINE

## 2019-09-03 PROCEDURE — G8598 ASA/ANTIPLAT THER USED: HCPCS | Performed by: INTERNAL MEDICINE

## 2019-09-03 NOTE — LETTER
[facility] Cardiology - [address] [facility] [address] [facility]  Phone: [phone]  Fax: [phone]    Jun Spann MD        September 6, 2019     Felicia Church MD  [address] [facility]. [address] [facility] [address]    Patient: Santiago Oakes  MR Number: [id number]  YOB: 1970  Date of Visit: 9/3/2019    Dear Dr. Felicia Church:      [address]     H+P // CONSULT // OUTPATIENT VISIT // Alonna Gear     Referring Doctor Felicia Church MD   Encounter Type FU     CHIEF COMPLAINT     Visit Type Acute   Symptoms None   Problems AS, CAD, HTN, CHOL     HISTORY OF PRESENT ILLNESS     ? GEN - Doing fair. Continued sob. CTA reviewed and possible access through femoral and subclavian. Focused longterm on kidney transplant. ? AS - Recent echo with severe AS. Reports increasing sob. Denies cp, dizziness, syncope. ? CAD - Denies cp, dizziness, syncope, palpitations. ? HTN - Ambulatory BP readings in good range  ? CHOL - Last cholesterol reviewed and in good range.   ? MED - Compliant with CV meds listed below without notable side effects     COMPLIANCE     Category Meds Diet Salt Exercise Tobacco Alcohol Drugs   Compliant [x] [] [] [] [x] [x] [x]   [x]Counseling given on all above above categories    HISTORY/ALLERGIES/ROS     MedHx:  has a past medical history of Ambulatory dysfunction, Aortic stenosis, Arthritis, Asthma, Bilateral hilar adenopathy syndrome, CAD (coronary artery disease), Cardiomyopathy ([address] CHF (congestive heart failure) ([address] COPD (chronic obstructive pulmonary disease) ([address] Depression, Diabetes mellitus ([address] Difficult intravenous access, Emphysema of lung ([address] ESRD on peritoneal dialysis ([address] Fear of needles, Gastric ulcer, GERD (gastroesophageal reflux disease), Heart valve problem, Hemodialysis patient ([address] Hx of blood clots, Hyperlipidemia, Hypertension, MI (myocardial infarction) ([address]  blood glucose test strips (FREESTYLE LITE) strip Daily As needed. 100 strip 3    glucose monitoring kit (FREESTYLE) monitoring kit 1 kit by Does not apply route daily 1 kit 0    LINZESS 145 MCG capsule TAKE 1 CAPSULE BY MOUTH EVERY MORNING (BEFORE BREAKFAST) 30 capsule 10    QUEtiapine (SEROQUEL) 25 MG tablet TAKE 1 TABLET BY MOUTH EVERY EVENING 30 tablet 5    tiZANidine (ZANAFLEX) 4 MG tablet TAKE 1 TABLET BY MOUTH THREE TIMES DAILY 90 tablet 10    albuterol (PROVENTIL) (2.5 MG/3ML) 0.083% nebulizer solution INHALE 1 VIAL VIA NEBULIZER EVERY 6 HOURS AS NEEDED FOR WHEEZING 360 mL 5    cyclobenzaprine (FLEXERIL) 10 MG tablet TAKE (1) TABLET BY MOUTH EVERY 8 HOURS AS NEEDED  2    vitamin D (ERGOCALCIFEROL) 62748 units CAPS capsule TK 1 C PO WEEKLY  11    GENERLAC 10 GM/15ML SOLN solution TK 15 MLS PO TID PRF CONSTIPATION  0    nitroGLYCERIN (NITROSTAT) 0.4 MG SL tablet Place 1 tablet under the tongue every 5 minutes as needed for Chest pain up to max of 3 total doses.  If no relief after 1 dose, call 911. 25 tablet 3    albuterol (PROVENTIL) (2.5 MG/3ML) 0.083% nebulizer solution Take 3 mLs by nebulization every 6 hours as needed for Wheezing 1080 each 3    B Complex-C-Folic Acid (VIRT-CAPS) 1 MG CAPS TK ONE C PO  QD 90 capsule 1    diltiazem (CARTIA XT) 180 MG extended release capsule TAKE 1 CAPSULE EVERY DAY 90 capsule 1    carvedilol (COREG) 12.5 MG tablet Take 1 tablet by mouth 2 times daily (with meals) 180 tablet 1    citalopram (CELEXA) 40 MG tablet TAKE 1 TABLET EVERY DAY 90 tablet 1    insulin glargine (LANTUS) 100 UNIT/ML injection vial Inject 10-15 Units into the skin nightly 6 vial 1    losartan (COZAAR) 50 MG tablet Take 1 tablet by mouth daily (Patient taking differently: Take 50 mg by mouth daily Indications: stopped on 6/25 for surgery ) 90 tablet 1    Pediatric Multivitamins-Fl (MULTI VIT/FL) 0.25 MG CHEW Take 25 mg by mouth daily 90 tablet 1 2)[facility] SAVR - higher risk with lung dz, most durable  3)[facility] SAVR - clear increased perioperative risk compared to TAVR, no longterm advantage to TAVR  Await surgical opinion   ~CAD   Date EF Results   Sx   sob   Hx   SVPCI   [facility] 5/15  1/17  8/19  PCI of LAD ([place])  Nonobstructive CAD (Ste[place])  Nonobstructive CAD ([place])   MPI 4/19 43% No ischemia   Plan   Continue aggressive medical treatment at doses above   ~HTN  Today BP Controlled   Counseling Counseled on diet/salt, exercise and ideal body weight   Plan Continue current meds at doses above   ~Hyperchol  Goal LDL <70   Counseling Counseled on diet, exercise and ideal body weight   Plan PCP liver/lipid surveillance, continue current meds at doses above   ~ESRD  Catheter based dialysis at this time  Very eager for txp however quit smoking only 3 months ago  ~Tobacco  Status Quit several months ago   Counseling Counseled on risks, cessation therapies discussed   PFTs 2018: FEV1 39%   Plan Continued avoidance of first and second hand smoke   ~Followup  Interval: After meeting with [address] [place] [address], Wero Garsia am scribing for and in the presence of Silvina Hoffmann MD.   Signed, Wero Garsia 08/29/19 4:02 PM   Provider Dolores Locke is working as a scribe for and in the presence of me (Silvina Hoffmann MD). Working as a scribe, Wero Garsia may have prepopulated components of this note with my historical  intellectual property under my direct supervision. Any additions to this intellectual property were performed in my presence and at my direction. Furthermore, the content and accuracy of this note have been reviewed by me Silvina Hoffmann MD).  9/3/2019 7:52 AM      If you have questions, please do not hesitate to call me. I look forward to following Ludy Gudino along with you.     Sincerely,        Betzaida Menon MD

## 2019-09-12 RX ORDER — PRAVASTATIN SODIUM 80 MG/1
TABLET ORAL
Qty: 90 TABLET | Refills: 1 | Status: CANCELLED | OUTPATIENT
Start: 2019-09-12

## 2019-09-12 RX ORDER — CLOPIDOGREL BISULFATE 75 MG/1
TABLET ORAL
Qty: 90 TABLET | Refills: 1 | Status: CANCELLED | OUTPATIENT
Start: 2019-09-12

## 2019-09-13 RX ORDER — PRAVASTATIN SODIUM 80 MG/1
TABLET ORAL
Qty: 90 TABLET | Refills: 3 | Status: SHIPPED | OUTPATIENT
Start: 2019-09-13 | End: 2019-09-18 | Stop reason: SDUPTHER

## 2019-09-13 RX ORDER — CLOPIDOGREL BISULFATE 75 MG/1
TABLET ORAL
Qty: 90 TABLET | Refills: 3 | Status: SHIPPED | OUTPATIENT
Start: 2019-09-13 | End: 2019-09-18 | Stop reason: SDUPTHER

## 2019-09-13 NOTE — TELEPHONE ENCOUNTER
7/29/19 [facility]  Plan:  Referral to the TAVR clinic next Tuesday with Dr. Darling Nearing current medications   Check blood pressure at home weekly  Regular exercise and following a healthy diet encouraged

## 2019-09-17 NOTE — TELEPHONE ENCOUNTER
[facility] pharmacy called the office requesting the refills for pt's clopidogrel, pravastatin, and isosorbide mononitrate medications. According to pt's chart the three medications were sent to that pharmacy as of 9-13-19. I tried calling [facility] pharmacy but could not get through, possibly dialed wrong ext. Please advise. The pharmacy gave # [phone].

## 2019-09-18 ENCOUNTER — TELEPHONE (OUTPATIENT)
Dept: FAMILY MEDICINE CLINIC | Age: 49
End: 2019-09-18

## 2019-09-18 RX ORDER — ISOSORBIDE MONONITRATE 30 MG/1
TABLET, EXTENDED RELEASE ORAL
Qty: 90 TABLET | Refills: 3 | Status: SHIPPED
Start: 2019-09-18 | End: 2020-03-10 | Stop reason: SINTOL

## 2019-09-18 RX ORDER — PRAVASTATIN SODIUM 80 MG/1
TABLET ORAL
Qty: 90 TABLET | Refills: 3 | Status: SHIPPED | OUTPATIENT
Start: 2019-09-18 | End: 2020-01-10 | Stop reason: SDUPTHER

## 2019-09-18 RX ORDER — CLOPIDOGREL BISULFATE 75 MG/1
TABLET ORAL
Qty: 90 TABLET | Refills: 3 | Status: SHIPPED | OUTPATIENT
Start: 2019-09-18 | End: 2020-01-10 | Stop reason: SDUPTHER

## 2019-09-26 ENCOUNTER — OFFICE VISIT (OUTPATIENT)
Dept: CARDIOTHORACIC SURGERY | Age: 49
End: 2019-09-26
Payer: COMMERCIAL

## 2019-09-26 VITALS
HEIGHT: 69 IN | DIASTOLIC BLOOD PRESSURE: 60 MMHG | TEMPERATURE: 98.2 F | HEART RATE: 80 BPM | OXYGEN SATURATION: 98 % | WEIGHT: 247 LBS | BODY MASS INDEX: 36.58 KG/M2 | SYSTOLIC BLOOD PRESSURE: 100 MMHG

## 2019-09-26 DIAGNOSIS — Z99.89 OSA ON CPAP: ICD-10-CM

## 2019-09-26 DIAGNOSIS — I25.5 ISCHEMIC CARDIOMYOPATHY: ICD-10-CM

## 2019-09-26 DIAGNOSIS — I35.0 NONRHEUMATIC AORTIC VALVE STENOSIS: Primary | ICD-10-CM

## 2019-09-26 DIAGNOSIS — I10 ESSENTIAL HYPERTENSION: ICD-10-CM

## 2019-09-26 DIAGNOSIS — Q23.1 BICUSPID AORTIC VALVE: ICD-10-CM

## 2019-09-26 DIAGNOSIS — J44.9 CHRONIC OBSTRUCTIVE PULMONARY DISEASE, UNSPECIFIED COPD TYPE (HCC): ICD-10-CM

## 2019-09-26 DIAGNOSIS — N18.6 END-STAGE RENAL DISEASE (HCC): ICD-10-CM

## 2019-09-26 DIAGNOSIS — I73.9 PVD (PERIPHERAL VASCULAR DISEASE) (HCC): ICD-10-CM

## 2019-09-26 DIAGNOSIS — G47.33 OSA ON CPAP: ICD-10-CM

## 2019-09-26 DIAGNOSIS — E78.5 DYSLIPIDEMIA: ICD-10-CM

## 2019-09-26 PROCEDURE — 1036F TOBACCO NON-USER: CPT | Performed by: THORACIC SURGERY (CARDIOTHORACIC VASCULAR SURGERY)

## 2019-09-26 PROCEDURE — 3023F SPIROM DOC REV: CPT | Performed by: THORACIC SURGERY (CARDIOTHORACIC VASCULAR SURGERY)

## 2019-09-26 PROCEDURE — G8417 CALC BMI ABV UP PARAM F/U: HCPCS | Performed by: THORACIC SURGERY (CARDIOTHORACIC VASCULAR SURGERY)

## 2019-09-26 PROCEDURE — 3017F COLORECTAL CA SCREEN DOC REV: CPT | Performed by: THORACIC SURGERY (CARDIOTHORACIC VASCULAR SURGERY)

## 2019-09-26 PROCEDURE — 3045F PR MOST RECENT HEMOGLOBIN A1C LEVEL 7.0-9.0%: CPT | Performed by: THORACIC SURGERY (CARDIOTHORACIC VASCULAR SURGERY)

## 2019-09-26 PROCEDURE — 2022F DILAT RTA XM EVC RTNOPTHY: CPT | Performed by: THORACIC SURGERY (CARDIOTHORACIC VASCULAR SURGERY)

## 2019-09-26 PROCEDURE — G8598 ASA/ANTIPLAT THER USED: HCPCS | Performed by: THORACIC SURGERY (CARDIOTHORACIC VASCULAR SURGERY)

## 2019-09-26 PROCEDURE — G8427 DOCREV CUR MEDS BY ELIG CLIN: HCPCS | Performed by: THORACIC SURGERY (CARDIOTHORACIC VASCULAR SURGERY)

## 2019-09-26 PROCEDURE — 99204 OFFICE O/P NEW MOD 45 MIN: CPT | Performed by: THORACIC SURGERY (CARDIOTHORACIC VASCULAR SURGERY)

## 2019-09-26 PROCEDURE — G8926 SPIRO NO PERF OR DOC: HCPCS | Performed by: THORACIC SURGERY (CARDIOTHORACIC VASCULAR SURGERY)

## 2019-09-26 NOTE — PROGRESS NOTES
vomiting)     Prolonged emergence from general anesthesia     States requires more medication than most people    Sleep apnea     Uses CPAP    Stroke ([facility]     7mm thalamic cva 2017 deficts left side, left side weakness    TIA (transient ischemic attack)     Unspecified diseases of blood and blood-forming organs        Past Surgical History:        Procedure Laterality Date    CATHETER REMOVAL Right 7/2/2019    PERITONEAL DIALYSIS CATHETER REMOVAL performed by Jhonny Matos MD at [facility] COLONOSCOPY  2/29/2015    WNL    CORONARY ANGIOPLASTY WITH STENT PLACEMENT  05/26/15    CYST REMOVAL  08/14/2013    EXCISION CYSTS, NECK X2 AND ABDOMINAL benign    DIAGNOSTIC CARDIAC CATH LAB PROCEDURE      DIALYSIS FISTULA CREATION Left 10/30/2017    LEFT BRACHIAL CEPHALIC FISTULA    DIALYSIS FISTULA CREATION Left 3/27/2019    LIGATION  AV FISTULA performed by Elmer Clark MD at [address] [place] [address], [address] [place] [address], [facility]      OTHER SURGICAL HISTORY  02/01/2017    laparoscopic cholecystectomy with intraoperative cholangiogram    OTHER SURGICAL HISTORY  2018    PORT PLACEMENT  - vas cath    OTHER SURGICAL HISTORY Bilateral 06/26/2018    laprascopic peritoneal dialysis catheter placement    OTHER SURGICAL HISTORY Right 09/2018    peritoneal dialysis port placed on right side of abdomen    OTHER SURGICAL HISTORY  05/28/2019    PTA/Stenting R External Iliac artery    IA LAP INSERTION TUNNELED INTRAPERITONEAL CATHETER N/A 9/21/2018    LAPAROSCOPIC PERITONEAL DIALYSIS CATHETER REPLACEMENT performed by Jhonny Matos MD at [address] [place] [address]  01/06/2016    UPPER GASTROINTESTINAL ENDOSCOPY  01/29/2017    possible candida, otherwise normal appearing    VASCULAR SURGERY  aprx 2 years ago    2 stents placed, each side of groin       Medications:   Current Outpatient Medications   Medication Sig Dispense Refill    clopidogrel (PLAVIX)

## 2019-10-01 ENCOUNTER — TELEPHONE (OUTPATIENT)
Dept: FAMILY MEDICINE CLINIC | Age: 49
End: 2019-10-01
Payer: COMMERCIAL

## 2019-10-01 DIAGNOSIS — E11.22 TYPE 2 DIABETES MELLITUS WITH DIABETIC CHRONIC KIDNEY DISEASE, UNSPECIFIED CKD STAGE, UNSPECIFIED WHETHER LONG TERM INSULIN USE (HCC): Primary | ICD-10-CM

## 2019-10-01 PROCEDURE — G0179 MD RECERTIFICATION HHA PT: HCPCS | Performed by: FAMILY MEDICINE

## 2019-10-03 ENCOUNTER — TELEPHONE (OUTPATIENT)
Dept: CARDIOLOGY CLINIC | Age: 49
End: 2019-10-03

## 2019-10-03 ENCOUNTER — TELEPHONE (OUTPATIENT)
Dept: FAMILY MEDICINE CLINIC | Age: 49
End: 2019-10-03

## 2019-10-07 ENCOUNTER — TELEPHONE (OUTPATIENT)
Dept: CARDIOLOGY CLINIC | Age: 49
End: 2019-10-07

## 2019-10-09 DIAGNOSIS — F32.A DEPRESSION, UNSPECIFIED DEPRESSION TYPE: ICD-10-CM

## 2019-10-09 RX ORDER — TRAZODONE HYDROCHLORIDE 150 MG/1
TABLET ORAL
Qty: 90 TABLET | Refills: 10 | Status: SHIPPED | OUTPATIENT
Start: 2019-10-09 | End: 2020-11-04

## 2019-10-09 RX ORDER — CARVEDILOL 12.5 MG/1
TABLET ORAL
Qty: 180 TABLET | Refills: 10 | Status: ON HOLD | OUTPATIENT
Start: 2019-10-09 | End: 2019-10-23 | Stop reason: HOSPADM

## 2019-10-09 RX ORDER — DILTIAZEM HYDROCHLORIDE 180 MG/1
CAPSULE, COATED, EXTENDED RELEASE ORAL
Qty: 90 CAPSULE | Refills: 10 | Status: ON HOLD | OUTPATIENT
Start: 2019-10-09 | End: 2019-10-23 | Stop reason: HOSPADM

## 2019-10-09 RX ORDER — CITALOPRAM 40 MG/1
TABLET ORAL
Qty: 90 TABLET | Refills: 10 | Status: SHIPPED | OUTPATIENT
Start: 2019-10-09 | End: 2020-04-21

## 2019-10-10 ENCOUNTER — OFFICE VISIT (OUTPATIENT)
Dept: CARDIOLOGY CLINIC | Age: 49
End: 2019-10-10
Payer: COMMERCIAL

## 2019-10-10 ENCOUNTER — TELEPHONE (OUTPATIENT)
Dept: CARDIOLOGY CLINIC | Age: 49
End: 2019-10-10

## 2019-10-10 ENCOUNTER — ANESTHESIA EVENT (OUTPATIENT)
Dept: OPERATING ROOM | Age: 49
DRG: 266 | End: 2019-10-10
Payer: COMMERCIAL

## 2019-10-10 ENCOUNTER — HOSPITAL ENCOUNTER (OUTPATIENT)
Dept: GENERAL RADIOLOGY | Age: 49
Discharge: HOME | End: 2019-10-10
Payer: COMMERCIAL

## 2019-10-10 ENCOUNTER — HOSPITAL ENCOUNTER (OUTPATIENT)
Dept: PREADMISSION TESTING | Age: 49
Discharge: HOME | End: 2019-10-14
Payer: COMMERCIAL

## 2019-10-10 VITALS
BODY MASS INDEX: 36.58 KG/M2 | HEART RATE: 74 BPM | SYSTOLIC BLOOD PRESSURE: 147 MMHG | RESPIRATION RATE: 16 BRPM | WEIGHT: 247 LBS | DIASTOLIC BLOOD PRESSURE: 59 MMHG | TEMPERATURE: 97.5 F | OXYGEN SATURATION: 97 % | HEIGHT: 69 IN

## 2019-10-10 VITALS
BODY MASS INDEX: 38.95 KG/M2 | HEART RATE: 74 BPM | DIASTOLIC BLOOD PRESSURE: 70 MMHG | WEIGHT: 263 LBS | SYSTOLIC BLOOD PRESSURE: 130 MMHG | OXYGEN SATURATION: 95 % | HEIGHT: 69 IN

## 2019-10-10 DIAGNOSIS — Z72.0 TOBACCO ABUSE: ICD-10-CM

## 2019-10-10 DIAGNOSIS — E78.00 HYPERCHOLESTEREMIA: ICD-10-CM

## 2019-10-10 DIAGNOSIS — I35.0 NONRHEUMATIC AORTIC VALVE STENOSIS: ICD-10-CM

## 2019-10-10 DIAGNOSIS — I10 ESSENTIAL HYPERTENSION: ICD-10-CM

## 2019-10-10 DIAGNOSIS — I25.10 CORONARY ARTERY DISEASE INVOLVING NATIVE CORONARY ARTERY OF NATIVE HEART WITHOUT ANGINA PECTORIS: Primary | ICD-10-CM

## 2019-10-10 LAB
ABO/RH: NORMAL
ALBUMIN SERPL-MCNC: 3.5 G/DL (ref 3.4–5)
ALP BLD-CCNC: 100 U/L (ref 40–129)
ALT SERPL-CCNC: 16 U/L (ref 10–40)
ANION GAP SERPL CALCULATED.3IONS-SCNC: 16 MMOL/L (ref 3–16)
ANTIBODY SCREEN: NORMAL
AST SERPL-CCNC: 12 U/L (ref 15–37)
BASOPHILS ABSOLUTE: 0.1 K/UL (ref 0–0.2)
BASOPHILS RELATIVE PERCENT: 0.6 %
BILIRUB SERPL-MCNC: <0.2 MG/DL (ref 0–1)
BILIRUBIN DIRECT: <0.2 MG/DL (ref 0–0.3)
BILIRUBIN, INDIRECT: ABNORMAL MG/DL (ref 0–1)
BUN BLDV-MCNC: 79 MG/DL (ref 7–20)
CALCIUM SERPL-MCNC: 9.1 MG/DL (ref 8.3–10.6)
CHLORIDE BLD-SCNC: 95 MMOL/L (ref 99–110)
CO2: 24 MMOL/L (ref 21–32)
CREAT SERPL-MCNC: 5.4 MG/DL (ref 0.9–1.3)
EOSINOPHILS ABSOLUTE: 0.4 K/UL (ref 0–0.6)
EOSINOPHILS RELATIVE PERCENT: 3.5 %
GFR AFRICAN AMERICAN: 14
GFR NON-AFRICAN AMERICAN: 11
GLUCOSE BLD-MCNC: 397 MG/DL (ref 70–99)
HCT VFR BLD CALC: 32.2 % (ref 40.5–52.5)
HEMOGLOBIN: 10.7 G/DL (ref 13.5–17.5)
INR BLD: 0.94 (ref 0.86–1.14)
LYMPHOCYTES ABSOLUTE: 1 K/UL (ref 1–5.1)
LYMPHOCYTES RELATIVE PERCENT: 10 %
MAGNESIUM: 1.8 MG/DL (ref 1.8–2.4)
MCH RBC QN AUTO: 32 PG (ref 26–34)
MCHC RBC AUTO-ENTMCNC: 33.3 G/DL (ref 31–36)
MCV RBC AUTO: 96.1 FL (ref 80–100)
MONOCYTES ABSOLUTE: 0.5 K/UL (ref 0–1.3)
MONOCYTES RELATIVE PERCENT: 5.3 %
NEUTROPHILS ABSOLUTE: 8 K/UL (ref 1.7–7.7)
NEUTROPHILS RELATIVE PERCENT: 80.6 %
PDW BLD-RTO: 14.1 % (ref 12.4–15.4)
PLATELET # BLD: 212 K/UL (ref 135–450)
PMV BLD AUTO: 8.1 FL (ref 5–10.5)
POTASSIUM SERPL-SCNC: 4.3 MMOL/L (ref 3.5–5.1)
PROTHROMBIN TIME: 10.7 SEC (ref 9.8–13)
RBC # BLD: 3.35 M/UL (ref 4.2–5.9)
SODIUM BLD-SCNC: 135 MMOL/L (ref 136–145)
TOTAL PROTEIN: 6.7 G/DL (ref 6.4–8.2)
WBC # BLD: 9.9 K/UL (ref 4–11)

## 2019-10-10 PROCEDURE — 85610 PROTHROMBIN TIME: CPT

## 2019-10-10 PROCEDURE — 99214 OFFICE O/P EST MOD 30 MIN: CPT | Performed by: INTERNAL MEDICINE

## 2019-10-10 PROCEDURE — 83735 ASSAY OF MAGNESIUM: CPT

## 2019-10-10 PROCEDURE — 86900 BLOOD TYPING SEROLOGIC ABO: CPT

## 2019-10-10 PROCEDURE — 80048 BASIC METABOLIC PNL TOTAL CA: CPT

## 2019-10-10 PROCEDURE — 1036F TOBACCO NON-USER: CPT | Performed by: INTERNAL MEDICINE

## 2019-10-10 PROCEDURE — 80076 HEPATIC FUNCTION PANEL: CPT

## 2019-10-10 PROCEDURE — G8417 CALC BMI ABV UP PARAM F/U: HCPCS | Performed by: INTERNAL MEDICINE

## 2019-10-10 PROCEDURE — G8484 FLU IMMUNIZE NO ADMIN: HCPCS | Performed by: INTERNAL MEDICINE

## 2019-10-10 PROCEDURE — 93005 ELECTROCARDIOGRAM TRACING: CPT | Performed by: INTERNAL MEDICINE

## 2019-10-10 PROCEDURE — 3017F COLORECTAL CA SCREEN DOC REV: CPT | Performed by: INTERNAL MEDICINE

## 2019-10-10 PROCEDURE — 86901 BLOOD TYPING SEROLOGIC RH(D): CPT

## 2019-10-10 PROCEDURE — 71046 X-RAY EXAM CHEST 2 VIEWS: CPT

## 2019-10-10 PROCEDURE — G8598 ASA/ANTIPLAT THER USED: HCPCS | Performed by: INTERNAL MEDICINE

## 2019-10-10 PROCEDURE — 86850 RBC ANTIBODY SCREEN: CPT

## 2019-10-10 PROCEDURE — 85025 COMPLETE CBC W/AUTO DIFF WBC: CPT

## 2019-10-10 PROCEDURE — G8427 DOCREV CUR MEDS BY ELIG CLIN: HCPCS | Performed by: INTERNAL MEDICINE

## 2019-10-10 PROCEDURE — 36415 COLL VENOUS BLD VENIPUNCTURE: CPT

## 2019-10-10 ASSESSMENT — ENCOUNTER SYMPTOMS: SHORTNESS OF BREATH: 1

## 2019-10-11 LAB
EKG ATRIAL RATE: 72 BPM
EKG DIAGNOSIS: NORMAL
EKG P AXIS: 57 DEGREES
EKG P-R INTERVAL: 190 MS
EKG Q-T INTERVAL: 436 MS
EKG QRS DURATION: 92 MS
EKG QTC CALCULATION (BAZETT): 477 MS
EKG R AXIS: -23 DEGREES
EKG T AXIS: 103 DEGREES
EKG VENTRICULAR RATE: 72 BPM

## 2019-10-11 PROCEDURE — 93010 ELECTROCARDIOGRAM REPORT: CPT | Performed by: INTERNAL MEDICINE

## 2019-10-14 ENCOUNTER — HOSPITAL ENCOUNTER (OUTPATIENT)
Dept: INTERVENTIONAL RADIOLOGY/VASCULAR | Age: 49
Discharge: HOME | End: 2019-10-14
Payer: COMMERCIAL

## 2019-10-14 VITALS
OXYGEN SATURATION: 91 % | TEMPERATURE: 97.2 F | BODY MASS INDEX: 35.7 KG/M2 | WEIGHT: 241 LBS | HEART RATE: 74 BPM | RESPIRATION RATE: 20 BRPM | DIASTOLIC BLOOD PRESSURE: 63 MMHG | HEIGHT: 69 IN | SYSTOLIC BLOOD PRESSURE: 133 MMHG

## 2019-10-14 DIAGNOSIS — Z49.01: ICD-10-CM

## 2019-10-14 PROCEDURE — 36581 REPLACE TUNNELED CV CATH: CPT

## 2019-10-14 PROCEDURE — 77001 FLUOROGUIDE FOR VEIN DEVICE: CPT

## 2019-10-14 PROCEDURE — 7100000011 HC PHASE II RECOVERY - ADDTL 15 MIN

## 2019-10-14 PROCEDURE — 6360000002 HC RX W HCPCS: Performed by: RADIOLOGY

## 2019-10-14 PROCEDURE — 7100000010 HC PHASE II RECOVERY - FIRST 15 MIN

## 2019-10-14 PROCEDURE — C1881 DIALYSIS ACCESS SYSTEM: HCPCS

## 2019-10-14 RX ORDER — FENTANYL CITRATE 50 UG/ML
INJECTION, SOLUTION INTRAMUSCULAR; INTRAVENOUS
Status: COMPLETED | OUTPATIENT
Start: 2019-10-14 | End: 2019-10-14

## 2019-10-14 RX ORDER — MIDAZOLAM HYDROCHLORIDE 5 MG/ML
INJECTION INTRAMUSCULAR; INTRAVENOUS
Status: COMPLETED | OUTPATIENT
Start: 2019-10-14 | End: 2019-10-14

## 2019-10-14 RX ADMIN — Medication 50 MCG: at 08:44

## 2019-10-14 RX ADMIN — Medication 2 G: at 08:33

## 2019-10-14 RX ADMIN — Medication 50 MCG: at 08:47

## 2019-10-14 RX ADMIN — MIDAZOLAM HYDROCHLORIDE 1 MG: 5 INJECTION, SOLUTION INTRAMUSCULAR; INTRAVENOUS at 08:47

## 2019-10-14 RX ADMIN — MIDAZOLAM HYDROCHLORIDE 1 MG: 5 INJECTION, SOLUTION INTRAMUSCULAR; INTRAVENOUS at 08:44

## 2019-10-14 ASSESSMENT — PAIN - FUNCTIONAL ASSESSMENT: PAIN_FUNCTIONAL_ASSESSMENT: 0-10

## 2019-10-15 ENCOUNTER — ANESTHESIA (OUTPATIENT)
Dept: OPERATING ROOM | Age: 49
DRG: 266 | End: 2019-10-15
Payer: COMMERCIAL

## 2019-10-15 ENCOUNTER — HOSPITAL ENCOUNTER (INPATIENT)
Age: 49
LOS: 1 days | Discharge: HOME | DRG: 266 | End: 2019-10-16
Attending: INTERNAL MEDICINE | Admitting: INTERNAL MEDICINE
Payer: COMMERCIAL

## 2019-10-15 VITALS
OXYGEN SATURATION: 93 % | DIASTOLIC BLOOD PRESSURE: 85 MMHG | RESPIRATION RATE: 3 BRPM | SYSTOLIC BLOOD PRESSURE: 162 MMHG

## 2019-10-15 LAB
ABO/RH: NORMAL
ANTIBODY SCREEN: NORMAL
BASE EXCESS ARTERIAL: -1 (ref -3–3)
BASE EXCESS ARTERIAL: -1 (ref -3–3)
BASE EXCESS ARTERIAL: 1 (ref -3–3)
CALCIUM IONIZED: 1.13 MMOL/L (ref 1.12–1.32)
CALCIUM IONIZED: 1.15 MMOL/L (ref 1.12–1.32)
CALCIUM IONIZED: 1.17 MMOL/L (ref 1.12–1.32)
GLUCOSE BLD-MCNC: 278 MG/DL (ref 70–99)
GLUCOSE BLD-MCNC: 283 MG/DL (ref 70–99)
GLUCOSE BLD-MCNC: 284 MG/DL (ref 70–99)
GLUCOSE BLD-MCNC: 296 MG/DL (ref 70–99)
GLUCOSE BLD-MCNC: 391 MG/DL (ref 70–99)
HCO3 ARTERIAL: 26.8 MMOL/L (ref 21–29)
HCO3 ARTERIAL: 27 MMOL/L (ref 21–29)
HCO3 ARTERIAL: 29.2 MMOL/L (ref 21–29)
HEMOGLOBIN: 10.3 GM/DL (ref 13.5–17.5)
HEMOGLOBIN: 10.9 GM/DL (ref 13.5–17.5)
HEMOGLOBIN: 11 GM/DL (ref 13.5–17.5)
LACTATE: 0.46 MMOL/L (ref 0.4–2)
LACTATE: 0.69 MMOL/L (ref 0.4–2)
LACTATE: 0.78 MMOL/L (ref 0.4–2)
O2 SAT, ARTERIAL: 100 % (ref 93–100)
O2 SAT, ARTERIAL: 99 % (ref 93–100)
O2 SAT, ARTERIAL: 99 % (ref 93–100)
PCO2 ARTERIAL: 64.1 MM HG (ref 35–45)
PCO2 ARTERIAL: 67.6 MM HG (ref 35–45)
PCO2 ARTERIAL: 72.8 MM HG (ref 35–45)
PERFORMED ON: ABNORMAL
PH ARTERIAL: 7.21 (ref 7.35–7.45)
PH ARTERIAL: 7.21 (ref 7.35–7.45)
PH ARTERIAL: 7.23 (ref 7.35–7.45)
PO2 ARTERIAL: 143.2 MM HG (ref 75–108)
PO2 ARTERIAL: 149.6 MM HG (ref 75–108)
PO2 ARTERIAL: 215.1 MM HG (ref 75–108)
POC HEMATOCRIT: 30 % (ref 40.5–52.5)
POC HEMATOCRIT: 32 % (ref 40.5–52.5)
POC HEMATOCRIT: 32 % (ref 40.5–52.5)
POC POTASSIUM: 4.1 MMOL/L (ref 3.5–5.1)
POC POTASSIUM: 4.3 MMOL/L (ref 3.5–5.1)
POC POTASSIUM: 4.4 MMOL/L (ref 3.5–5.1)
POC SAMPLE TYPE: ABNORMAL
POC SODIUM: 136 MMOL/L (ref 136–145)
POC SODIUM: 136 MMOL/L (ref 136–145)
POC SODIUM: 138 MMOL/L (ref 136–145)
POTASSIUM SERPL-SCNC: 5 MMOL/L (ref 3.5–5.1)
PRO-BNP: 7601 PG/ML (ref 0–124)
TCO2 ARTERIAL: 29 MMOL/L
TCO2 ARTERIAL: 29 MMOL/L
TCO2 ARTERIAL: 31 MMOL/L
TROPONIN: 0.04 NG/ML

## 2019-10-15 PROCEDURE — 83880 ASSAY OF NATRIURETIC PEPTIDE: CPT

## 2019-10-15 PROCEDURE — 02RF38Z REPLACEMENT OF AORTIC VALVE WITH ZOOPLASTIC TISSUE, PERCUTANEOUS APPROACH: ICD-10-PCS | Performed by: INTERNAL MEDICINE

## 2019-10-15 PROCEDURE — 85347 COAGULATION TIME ACTIVATED: CPT

## 2019-10-15 PROCEDURE — C1751 CATH, INF, PER/CENT/MIDLINE: HCPCS | Performed by: THORACIC SURGERY (CARDIOTHORACIC VASCULAR SURGERY)

## 2019-10-15 PROCEDURE — 2500000003 HC RX 250 WO HCPCS: Performed by: THORACIC SURGERY (CARDIOTHORACIC VASCULAR SURGERY)

## 2019-10-15 PROCEDURE — 2580000003 HC RX 258: Performed by: INTERNAL MEDICINE

## 2019-10-15 PROCEDURE — 2500000003 HC RX 250 WO HCPCS: Performed by: ANESTHESIOLOGY

## 2019-10-15 PROCEDURE — 84484 ASSAY OF TROPONIN QUANT: CPT

## 2019-10-15 PROCEDURE — 82947 ASSAY GLUCOSE BLOOD QUANT: CPT

## 2019-10-15 PROCEDURE — 6360000004 HC RX CONTRAST MEDICATION

## 2019-10-15 PROCEDURE — C1725 CATH, TRANSLUMIN NON-LASER: HCPCS

## 2019-10-15 PROCEDURE — 84295 ASSAY OF SERUM SODIUM: CPT

## 2019-10-15 PROCEDURE — 2580000003 HC RX 258: Performed by: THORACIC SURGERY (CARDIOTHORACIC VASCULAR SURGERY)

## 2019-10-15 PROCEDURE — 84132 ASSAY OF SERUM POTASSIUM: CPT

## 2019-10-15 PROCEDURE — 36415 COLL VENOUS BLD VENIPUNCTURE: CPT

## 2019-10-15 PROCEDURE — 86900 BLOOD TYPING SEROLOGIC ABO: CPT

## 2019-10-15 PROCEDURE — 2709999900 HC NON-CHARGEABLE SUPPLY

## 2019-10-15 PROCEDURE — 6370000000 HC RX 637 (ALT 250 FOR IP): Performed by: INTERNAL MEDICINE

## 2019-10-15 PROCEDURE — C1769 GUIDE WIRE: HCPCS

## 2019-10-15 PROCEDURE — 2720000010 HC SURG SUPPLY STERILE

## 2019-10-15 PROCEDURE — 2780000006 HC MISC HEART VALVE

## 2019-10-15 PROCEDURE — 86923 COMPATIBILITY TEST ELECTRIC: CPT

## 2019-10-15 PROCEDURE — 85014 HEMATOCRIT: CPT

## 2019-10-15 PROCEDURE — 82330 ASSAY OF CALCIUM: CPT

## 2019-10-15 PROCEDURE — 02HV33Z INSERTION OF INFUSION DEVICE INTO SUPERIOR VENA CAVA, PERCUTANEOUS APPROACH: ICD-10-PCS | Performed by: INTERNAL MEDICINE

## 2019-10-15 PROCEDURE — 2709999900 HC NON-CHARGEABLE SUPPLY: Performed by: THORACIC SURGERY (CARDIOTHORACIC VASCULAR SURGERY)

## 2019-10-15 PROCEDURE — 2140000000 HC CCU INTERMEDIATE R&B

## 2019-10-15 PROCEDURE — 33361 REPLACE AORTIC VALVE PERQ: CPT | Performed by: THORACIC SURGERY (CARDIOTHORACIC VASCULAR SURGERY)

## 2019-10-15 PROCEDURE — 3700000001 HC ADD 15 MINUTES (ANESTHESIA): Performed by: THORACIC SURGERY (CARDIOTHORACIC VASCULAR SURGERY)

## 2019-10-15 PROCEDURE — 6360000002 HC RX W HCPCS: Performed by: THORACIC SURGERY (CARDIOTHORACIC VASCULAR SURGERY)

## 2019-10-15 PROCEDURE — 2580000003 HC RX 258: Performed by: ANESTHESIOLOGY

## 2019-10-15 PROCEDURE — 6360000002 HC RX W HCPCS

## 2019-10-15 PROCEDURE — 82803 BLOOD GASES ANY COMBINATION: CPT

## 2019-10-15 PROCEDURE — 6360000002 HC RX W HCPCS: Performed by: ANESTHESIOLOGY

## 2019-10-15 PROCEDURE — C1894 INTRO/SHEATH, NON-LASER: HCPCS

## 2019-10-15 PROCEDURE — 33361 REPLACE AORTIC VALVE PERQ: CPT | Performed by: INTERNAL MEDICINE

## 2019-10-15 PROCEDURE — 93005 ELECTROCARDIOGRAM TRACING: CPT | Performed by: INTERNAL MEDICINE

## 2019-10-15 PROCEDURE — 83605 ASSAY OF LACTIC ACID: CPT

## 2019-10-15 PROCEDURE — 86850 RBC ANTIBODY SCREEN: CPT

## 2019-10-15 PROCEDURE — 86901 BLOOD TYPING SEROLOGIC RH(D): CPT

## 2019-10-15 PROCEDURE — 2500000003 HC RX 250 WO HCPCS

## 2019-10-15 PROCEDURE — B41G1ZZ FLUOROSCOPY OF LEFT LOWER EXTREMITY ARTERIES USING LOW OSMOLAR CONTRAST: ICD-10-PCS | Performed by: INTERNAL MEDICINE

## 2019-10-15 PROCEDURE — 3700000000 HC ANESTHESIA ATTENDED CARE: Performed by: THORACIC SURGERY (CARDIOTHORACIC VASCULAR SURGERY)

## 2019-10-15 RX ORDER — OXYCODONE AND ACETAMINOPHEN 7.5; 325 MG/1; MG/1
1 TABLET ORAL EVERY 4 HOURS PRN
Status: DISCONTINUED | OUTPATIENT
Start: 2019-10-15 | End: 2019-10-16 | Stop reason: HOSPADM

## 2019-10-15 RX ORDER — ALBUTEROL SULFATE 2.5 MG/3ML
2.5 SOLUTION RESPIRATORY (INHALATION) EVERY 6 HOURS PRN
Status: DISCONTINUED | OUTPATIENT
Start: 2019-10-15 | End: 2019-10-15 | Stop reason: SDUPTHER

## 2019-10-15 RX ORDER — SODIUM CHLORIDE 0.9 % (FLUSH) 0.9 %
10 SYRINGE (ML) INJECTION EVERY 12 HOURS SCHEDULED
Status: DISCONTINUED | OUTPATIENT
Start: 2019-10-15 | End: 2019-10-16 | Stop reason: HOSPADM

## 2019-10-15 RX ORDER — TRAZODONE HYDROCHLORIDE 50 MG/1
150 TABLET ORAL NIGHTLY
Status: DISCONTINUED | OUTPATIENT
Start: 2019-10-15 | End: 2019-10-16 | Stop reason: HOSPADM

## 2019-10-15 RX ORDER — ASPIRIN 81 MG/1
81 TABLET ORAL DAILY
Status: DISCONTINUED | OUTPATIENT
Start: 2019-10-15 | End: 2019-10-16 | Stop reason: HOSPADM

## 2019-10-15 RX ORDER — BUPROPION HYDROCHLORIDE 150 MG/1
150 TABLET ORAL EVERY MORNING
Status: DISCONTINUED | OUTPATIENT
Start: 2019-10-16 | End: 2019-10-16 | Stop reason: HOSPADM

## 2019-10-15 RX ORDER — CITALOPRAM 20 MG/1
40 TABLET ORAL DAILY
Status: DISCONTINUED | OUTPATIENT
Start: 2019-10-15 | End: 2019-10-16 | Stop reason: HOSPADM

## 2019-10-15 RX ORDER — SODIUM CHLORIDE 0.9 % (FLUSH) 0.9 %
10 SYRINGE (ML) INJECTION PRN
Status: DISCONTINUED | OUTPATIENT
Start: 2019-10-15 | End: 2019-10-16 | Stop reason: HOSPADM

## 2019-10-15 RX ORDER — OXYCODONE HYDROCHLORIDE AND ACETAMINOPHEN 5; 325 MG/1; MG/1
2 TABLET ORAL EVERY 4 HOURS PRN
Status: DISCONTINUED | OUTPATIENT
Start: 2019-10-15 | End: 2019-10-16 | Stop reason: HOSPADM

## 2019-10-15 RX ORDER — CLOPIDOGREL BISULFATE 75 MG/1
75 TABLET ORAL DAILY
Status: DISCONTINUED | OUTPATIENT
Start: 2019-10-15 | End: 2019-10-16 | Stop reason: HOSPADM

## 2019-10-15 RX ORDER — ATROPINE SULFATE 0.4 MG/ML
0.5 AMPUL (ML) INJECTION
Status: ACTIVE | OUTPATIENT
Start: 2019-10-15 | End: 2019-10-15

## 2019-10-15 RX ORDER — DIPHENHYDRAMINE HYDROCHLORIDE 50 MG/ML
INJECTION INTRAMUSCULAR; INTRAVENOUS PRN
Status: DISCONTINUED | OUTPATIENT
Start: 2019-10-15 | End: 2019-10-15 | Stop reason: SDUPTHER

## 2019-10-15 RX ORDER — PROPOFOL 10 MG/ML
INJECTION, EMULSION INTRAVENOUS CONTINUOUS PRN
Status: DISCONTINUED | OUTPATIENT
Start: 2019-10-15 | End: 2019-10-15 | Stop reason: SDUPTHER

## 2019-10-15 RX ORDER — SODIUM CHLORIDE 9 MG/ML
INJECTION, SOLUTION INTRAVENOUS CONTINUOUS PRN
Status: DISCONTINUED | OUTPATIENT
Start: 2019-10-15 | End: 2019-10-15 | Stop reason: SDUPTHER

## 2019-10-15 RX ORDER — DILTIAZEM HYDROCHLORIDE 180 MG/1
180 CAPSULE, COATED, EXTENDED RELEASE ORAL DAILY
Status: DISCONTINUED | OUTPATIENT
Start: 2019-10-15 | End: 2019-10-16 | Stop reason: HOSPADM

## 2019-10-15 RX ORDER — LIDOCAINE HYDROCHLORIDE 20 MG/ML
INJECTION, SOLUTION INFILTRATION; PERINEURAL PRN
Status: DISCONTINUED | OUTPATIENT
Start: 2019-10-15 | End: 2019-10-15 | Stop reason: SDUPTHER

## 2019-10-15 RX ORDER — NITROGLYCERIN 20 MG/100ML
5 INJECTION INTRAVENOUS CONTINUOUS
Status: DISCONTINUED | OUTPATIENT
Start: 2019-10-15 | End: 2019-10-16 | Stop reason: HOSPADM

## 2019-10-15 RX ORDER — ALBUTEROL SULFATE 2.5 MG/3ML
2.5 SOLUTION RESPIRATORY (INHALATION) EVERY 6 HOURS PRN
Status: DISCONTINUED | OUTPATIENT
Start: 2019-10-15 | End: 2019-10-16 | Stop reason: HOSPADM

## 2019-10-15 RX ORDER — CYCLOBENZAPRINE HCL 10 MG
10 TABLET ORAL 3 TIMES DAILY PRN
Status: DISCONTINUED | OUTPATIENT
Start: 2019-10-15 | End: 2019-10-16 | Stop reason: HOSPADM

## 2019-10-15 RX ORDER — NITROGLYCERIN 20 MG/100ML
INJECTION INTRAVENOUS CONTINUOUS PRN
Status: DISCONTINUED | OUTPATIENT
Start: 2019-10-15 | End: 2019-10-15 | Stop reason: SDUPTHER

## 2019-10-15 RX ORDER — PANTOPRAZOLE SODIUM 40 MG/1
40 TABLET, DELAYED RELEASE ORAL
Status: DISCONTINUED | OUTPATIENT
Start: 2019-10-16 | End: 2019-10-16 | Stop reason: HOSPADM

## 2019-10-15 RX ORDER — INSULIN GLARGINE 100 [IU]/ML
15 INJECTION, SOLUTION SUBCUTANEOUS NIGHTLY
Status: DISCONTINUED | OUTPATIENT
Start: 2019-10-15 | End: 2019-10-16 | Stop reason: HOSPADM

## 2019-10-15 RX ORDER — FENTANYL CITRATE 50 UG/ML
INJECTION, SOLUTION INTRAMUSCULAR; INTRAVENOUS PRN
Status: DISCONTINUED | OUTPATIENT
Start: 2019-10-15 | End: 2019-10-15 | Stop reason: SDUPTHER

## 2019-10-15 RX ORDER — LABETALOL HYDROCHLORIDE 5 MG/ML
INJECTION, SOLUTION INTRAVENOUS PRN
Status: DISCONTINUED | OUTPATIENT
Start: 2019-10-15 | End: 2019-10-15 | Stop reason: SDUPTHER

## 2019-10-15 RX ORDER — IPRATROPIUM BROMIDE AND ALBUTEROL SULFATE 2.5; .5 MG/3ML; MG/3ML
1 SOLUTION RESPIRATORY (INHALATION) EVERY 6 HOURS PRN
Status: DISCONTINUED | OUTPATIENT
Start: 2019-10-15 | End: 2019-10-16 | Stop reason: HOSPADM

## 2019-10-15 RX ORDER — TIZANIDINE 4 MG/1
4 TABLET ORAL 3 TIMES DAILY
Status: DISCONTINUED | OUTPATIENT
Start: 2019-10-15 | End: 2019-10-16 | Stop reason: HOSPADM

## 2019-10-15 RX ORDER — CEFAZOLIN SODIUM 1 G/3ML
INJECTION, POWDER, FOR SOLUTION INTRAMUSCULAR; INTRAVENOUS PRN
Status: DISCONTINUED | OUTPATIENT
Start: 2019-10-15 | End: 2019-10-15 | Stop reason: SDUPTHER

## 2019-10-15 RX ORDER — SODIUM CHLORIDE 9 MG/ML
INJECTION, SOLUTION INTRAVENOUS ONCE
Status: COMPLETED | OUTPATIENT
Start: 2019-10-15 | End: 2019-10-15

## 2019-10-15 RX ORDER — METAXALONE 800 MG/1
800 TABLET ORAL 3 TIMES DAILY PRN
Status: DISCONTINUED | OUTPATIENT
Start: 2019-10-15 | End: 2019-10-16 | Stop reason: HOSPADM

## 2019-10-15 RX ORDER — PRAVASTATIN SODIUM 80 MG/1
80 TABLET ORAL NIGHTLY
Status: DISCONTINUED | OUTPATIENT
Start: 2019-10-15 | End: 2019-10-16 | Stop reason: HOSPADM

## 2019-10-15 RX ORDER — NITROGLYCERIN 5 MG/ML
INJECTION, SOLUTION INTRAVENOUS PRN
Status: DISCONTINUED | OUTPATIENT
Start: 2019-10-15 | End: 2019-10-15 | Stop reason: SDUPTHER

## 2019-10-15 RX ORDER — QUETIAPINE FUMARATE 25 MG/1
25 TABLET, FILM COATED ORAL EVERY EVENING
Status: DISCONTINUED | OUTPATIENT
Start: 2019-10-15 | End: 2019-10-16 | Stop reason: HOSPADM

## 2019-10-15 RX ORDER — TORSEMIDE 100 MG/1
100 TABLET ORAL DAILY
Status: DISCONTINUED | OUTPATIENT
Start: 2019-10-15 | End: 2019-10-16 | Stop reason: HOSPADM

## 2019-10-15 RX ORDER — CARVEDILOL 6.25 MG/1
12.5 TABLET ORAL 2 TIMES DAILY WITH MEALS
Status: DISCONTINUED | OUTPATIENT
Start: 2019-10-15 | End: 2019-10-16 | Stop reason: HOSPADM

## 2019-10-15 RX ORDER — HYDROXYZINE HYDROCHLORIDE 10 MG/1
50 TABLET, FILM COATED ORAL NIGHTLY PRN
Status: DISCONTINUED | OUTPATIENT
Start: 2019-10-15 | End: 2019-10-16 | Stop reason: HOSPADM

## 2019-10-15 RX ORDER — ACETAMINOPHEN 325 MG/1
650 TABLET ORAL EVERY 4 HOURS PRN
Status: DISCONTINUED | OUTPATIENT
Start: 2019-10-15 | End: 2019-10-16 | Stop reason: HOSPADM

## 2019-10-15 RX ORDER — ALBUTEROL SULFATE 90 UG/1
2 AEROSOL, METERED RESPIRATORY (INHALATION) EVERY 6 HOURS PRN
Status: DISCONTINUED | OUTPATIENT
Start: 2019-10-15 | End: 2019-10-16 | Stop reason: HOSPADM

## 2019-10-15 RX ORDER — ONDANSETRON 2 MG/ML
4 INJECTION INTRAMUSCULAR; INTRAVENOUS EVERY 6 HOURS PRN
Status: DISCONTINUED | OUTPATIENT
Start: 2019-10-15 | End: 2019-10-16 | Stop reason: HOSPADM

## 2019-10-15 RX ORDER — ONDANSETRON 2 MG/ML
INJECTION INTRAMUSCULAR; INTRAVENOUS PRN
Status: DISCONTINUED | OUTPATIENT
Start: 2019-10-15 | End: 2019-10-15 | Stop reason: SDUPTHER

## 2019-10-15 RX ORDER — LOSARTAN POTASSIUM 25 MG/1
50 TABLET ORAL DAILY
Status: DISCONTINUED | OUTPATIENT
Start: 2019-10-15 | End: 2019-10-16 | Stop reason: HOSPADM

## 2019-10-15 RX ORDER — KETAMINE HYDROCHLORIDE 10 MG/ML
INJECTION, SOLUTION INTRAMUSCULAR; INTRAVENOUS PRN
Status: DISCONTINUED | OUTPATIENT
Start: 2019-10-15 | End: 2019-10-15 | Stop reason: SDUPTHER

## 2019-10-15 RX ORDER — GLYCOPYRROLATE 0.2 MG/ML
INJECTION INTRAMUSCULAR; INTRAVENOUS PRN
Status: DISCONTINUED | OUTPATIENT
Start: 2019-10-15 | End: 2019-10-15 | Stop reason: SDUPTHER

## 2019-10-15 RX ORDER — DULOXETIN HYDROCHLORIDE 30 MG/1
30 CAPSULE, DELAYED RELEASE ORAL DAILY
Status: DISCONTINUED | OUTPATIENT
Start: 2019-10-15 | End: 2019-10-16 | Stop reason: HOSPADM

## 2019-10-15 RX ORDER — HYDRALAZINE HYDROCHLORIDE 20 MG/ML
10 INJECTION INTRAMUSCULAR; INTRAVENOUS EVERY 6 HOURS PRN
Status: DISCONTINUED | OUTPATIENT
Start: 2019-10-15 | End: 2019-10-16 | Stop reason: HOSPADM

## 2019-10-15 RX ORDER — LIDOCAINE 4 G/G
1 PATCH TOPICAL ONCE
Status: COMPLETED | OUTPATIENT
Start: 2019-10-15 | End: 2019-10-16

## 2019-10-15 RX ORDER — ISOSORBIDE MONONITRATE 30 MG/1
30 TABLET, EXTENDED RELEASE ORAL DAILY
Status: DISCONTINUED | OUTPATIENT
Start: 2019-10-15 | End: 2019-10-16 | Stop reason: HOSPADM

## 2019-10-15 RX ORDER — 0.9 % SODIUM CHLORIDE 0.9 %
500 INTRAVENOUS SOLUTION INTRAVENOUS PRN
Status: DISCONTINUED | OUTPATIENT
Start: 2019-10-15 | End: 2019-10-16 | Stop reason: HOSPADM

## 2019-10-15 RX ADMIN — LABETALOL HYDROCHLORIDE 5 MG: 5 INJECTION, SOLUTION INTRAVENOUS at 14:29

## 2019-10-15 RX ADMIN — HYDROCORTISONE SODIUM SUCCINATE 100 MG: 100 INJECTION, POWDER, FOR SOLUTION INTRAMUSCULAR; INTRAVENOUS at 14:31

## 2019-10-15 RX ADMIN — KETAMINE HYDROCHLORIDE 20 MG: 10 INJECTION, SOLUTION INTRAMUSCULAR; INTRAVENOUS at 14:30

## 2019-10-15 RX ADMIN — LIDOCAINE HYDROCHLORIDE 40 MG: 20 INJECTION, SOLUTION INFILTRATION; PERINEURAL at 14:29

## 2019-10-15 RX ADMIN — NITROGLYCERIN 200 MCG: 5 INJECTION, SOLUTION INTRAVENOUS at 15:41

## 2019-10-15 RX ADMIN — QUETIAPINE FUMARATE 25 MG: 25 TABLET ORAL at 19:10

## 2019-10-15 RX ADMIN — CLOPIDOGREL 75 MG: 75 TABLET, FILM COATED ORAL at 20:58

## 2019-10-15 RX ADMIN — PHENYLEPHRINE HYDROCHLORIDE 100 MCG: 10 INJECTION INTRAVENOUS at 15:33

## 2019-10-15 RX ADMIN — INSULIN LISPRO 3 UNITS: 100 INJECTION, SOLUTION INTRAVENOUS; SUBCUTANEOUS at 19:05

## 2019-10-15 RX ADMIN — SODIUM CHLORIDE: 9 INJECTION, SOLUTION INTRAVENOUS at 13:34

## 2019-10-15 RX ADMIN — PHENYLEPHRINE HYDROCHLORIDE 100 MCG: 10 INJECTION INTRAVENOUS at 15:34

## 2019-10-15 RX ADMIN — INSULIN GLARGINE 15 UNITS: 100 INJECTION, SOLUTION SUBCUTANEOUS at 22:07

## 2019-10-15 RX ADMIN — TORSEMIDE 100 MG: 100 TABLET ORAL at 19:13

## 2019-10-15 RX ADMIN — NITROGLYCERIN 200 MCG: 5 INJECTION, SOLUTION INTRAVENOUS at 14:28

## 2019-10-15 RX ADMIN — DILTIAZEM HYDROCHLORIDE 180 MG: 180 CAPSULE, COATED, EXTENDED RELEASE ORAL at 19:10

## 2019-10-15 RX ADMIN — ISOSORBIDE MONONITRATE 30 MG: 30 TABLET, EXTENDED RELEASE ORAL at 19:10

## 2019-10-15 RX ADMIN — FAMOTIDINE 20 MG: 10 INJECTION INTRAVENOUS at 14:59

## 2019-10-15 RX ADMIN — INSULIN LISPRO 2 UNITS: 100 INJECTION, SOLUTION INTRAVENOUS; SUBCUTANEOUS at 22:08

## 2019-10-15 RX ADMIN — OXYCODONE HYDROCHLORIDE AND ACETAMINOPHEN 2 TABLET: 5; 325 TABLET ORAL at 22:06

## 2019-10-15 RX ADMIN — LOSARTAN POTASSIUM 50 MG: 25 TABLET ORAL at 19:09

## 2019-10-15 RX ADMIN — TIZANIDINE 4 MG: 4 TABLET ORAL at 22:21

## 2019-10-15 RX ADMIN — LABETALOL HYDROCHLORIDE 10 MG: 5 INJECTION, SOLUTION INTRAVENOUS at 15:24

## 2019-10-15 RX ADMIN — DULOXETINE HYDROCHLORIDE 30 MG: 30 CAPSULE, DELAYED RELEASE ORAL at 19:09

## 2019-10-15 RX ADMIN — NITROGLYCERIN 200 MCG: 5 INJECTION, SOLUTION INTRAVENOUS at 15:36

## 2019-10-15 RX ADMIN — LABETALOL HYDROCHLORIDE 10 MG: 5 INJECTION, SOLUTION INTRAVENOUS at 15:21

## 2019-10-15 RX ADMIN — PRAVASTATIN SODIUM 80 MG: 80 TABLET ORAL at 20:58

## 2019-10-15 RX ADMIN — SODIUM CHLORIDE: 9 INJECTION, SOLUTION INTRAVENOUS at 14:00

## 2019-10-15 RX ADMIN — GLYCOPYRROLATE 0.2 MG: 0.2 INJECTION INTRAMUSCULAR; INTRAVENOUS at 14:35

## 2019-10-15 RX ADMIN — KETAMINE HYDROCHLORIDE 20 MG: 10 INJECTION, SOLUTION INTRAMUSCULAR; INTRAVENOUS at 13:34

## 2019-10-15 RX ADMIN — ONDANSETRON 8 MG: 2 INJECTION INTRAMUSCULAR; INTRAVENOUS at 14:31

## 2019-10-15 RX ADMIN — KETAMINE HYDROCHLORIDE 20 MG: 10 INJECTION, SOLUTION INTRAMUSCULAR; INTRAVENOUS at 15:16

## 2019-10-15 RX ADMIN — OXYCODONE HYDROCHLORIDE AND ACETAMINOPHEN 1 TABLET: 7.5; 325 TABLET ORAL at 22:21

## 2019-10-15 RX ADMIN — FENTANYL CITRATE 100 MCG: 50 INJECTION, SOLUTION INTRAMUSCULAR; INTRAVENOUS at 13:34

## 2019-10-15 RX ADMIN — METAXALONE 800 MG: 800 TABLET ORAL at 22:21

## 2019-10-15 RX ADMIN — CARVEDILOL 12.5 MG: 6.25 TABLET, FILM COATED ORAL at 19:10

## 2019-10-15 RX ADMIN — CYCLOBENZAPRINE HYDROCHLORIDE 10 MG: 10 TABLET, FILM COATED ORAL at 22:05

## 2019-10-15 RX ADMIN — ASPIRIN 81 MG: 81 TABLET, COATED ORAL at 20:58

## 2019-10-15 RX ADMIN — CITALOPRAM HYDROBROMIDE 40 MG: 20 TABLET ORAL at 19:09

## 2019-10-15 RX ADMIN — DIPHENHYDRAMINE HYDROCHLORIDE 25 MG: 50 INJECTION, SOLUTION INTRAMUSCULAR; INTRAVENOUS at 14:31

## 2019-10-15 RX ADMIN — TRAZODONE HYDROCHLORIDE 150 MG: 50 TABLET ORAL at 22:05

## 2019-10-15 RX ADMIN — OXYCODONE HYDROCHLORIDE AND ACETAMINOPHEN 2 TABLET: 5; 325 TABLET ORAL at 17:29

## 2019-10-15 RX ADMIN — LABETALOL HYDROCHLORIDE 5 MG: 5 INJECTION, SOLUTION INTRAVENOUS at 15:10

## 2019-10-15 RX ADMIN — NITROGLYCERIN 200 MCG: 5 INJECTION, SOLUTION INTRAVENOUS at 15:22

## 2019-10-15 RX ADMIN — PROPOFOL 55 MCG/KG/MIN: 10 INJECTION, EMULSION INTRAVENOUS at 14:28

## 2019-10-15 RX ADMIN — FENTANYL CITRATE 100 MCG: 50 INJECTION, SOLUTION INTRAMUSCULAR; INTRAVENOUS at 14:15

## 2019-10-15 RX ADMIN — TIZANIDINE 4 MG: 4 TABLET ORAL at 22:05

## 2019-10-15 RX ADMIN — CEFAZOLIN 2000 MG: 1 INJECTION, POWDER, FOR SOLUTION INTRAVENOUS at 14:23

## 2019-10-15 RX ADMIN — NITROGLYCERIN 50 MCG/MIN: 20 INJECTION INTRAVENOUS at 14:28

## 2019-10-15 RX ADMIN — Medication 10 ML: at 21:03

## 2019-10-15 ASSESSMENT — PULMONARY FUNCTION TESTS
PIF_VALUE: 5
PIF_VALUE: 4
PIF_VALUE: 3
PIF_VALUE: 5
PIF_VALUE: 5
PIF_VALUE: 4
PIF_VALUE: 4
PIF_VALUE: 10
PIF_VALUE: 4
PIF_VALUE: 4
PIF_VALUE: 5
PIF_VALUE: 2
PIF_VALUE: 4
PIF_VALUE: 5
PIF_VALUE: 5
PIF_VALUE: 10
PIF_VALUE: 4
PIF_VALUE: 9
PIF_VALUE: 6
PIF_VALUE: 0
PIF_VALUE: 5
PIF_VALUE: 4
PIF_VALUE: 3
PIF_VALUE: 2
PIF_VALUE: 5
PIF_VALUE: 4
PIF_VALUE: 0
PIF_VALUE: 3
PIF_VALUE: 9
PIF_VALUE: 6
PIF_VALUE: 3
PIF_VALUE: 7
PIF_VALUE: 4
PIF_VALUE: 10
PIF_VALUE: 6
PIF_VALUE: 3
PIF_VALUE: 5
PIF_VALUE: 9
PIF_VALUE: 5
PIF_VALUE: 5
PIF_VALUE: 4
PIF_VALUE: 11
PIF_VALUE: 11
PIF_VALUE: 4
PIF_VALUE: 5
PIF_VALUE: 9
PIF_VALUE: 4
PIF_VALUE: 6
PIF_VALUE: 3
PIF_VALUE: 5
PIF_VALUE: 3
PIF_VALUE: 4
PIF_VALUE: 4
PIF_VALUE: 13
PIF_VALUE: 5
PIF_VALUE: 6
PIF_VALUE: 10
PIF_VALUE: 5
PIF_VALUE: 8
PIF_VALUE: 5
PIF_VALUE: 12
PIF_VALUE: 3
PIF_VALUE: 5
PIF_VALUE: 3
PIF_VALUE: 7
PIF_VALUE: 4
PIF_VALUE: 12
PIF_VALUE: 10
PIF_VALUE: 4
PIF_VALUE: 3
PIF_VALUE: 11
PIF_VALUE: 4
PIF_VALUE: 9
PIF_VALUE: 5
PIF_VALUE: 4
PIF_VALUE: 5
PIF_VALUE: 0
PIF_VALUE: 6
PIF_VALUE: 5
PIF_VALUE: 4
PIF_VALUE: 9
PIF_VALUE: 5
PIF_VALUE: 4
PIF_VALUE: 6
PIF_VALUE: 5
PIF_VALUE: 0
PIF_VALUE: 2
PIF_VALUE: 5
PIF_VALUE: 0
PIF_VALUE: 4
PIF_VALUE: 0
PIF_VALUE: 10
PIF_VALUE: 5
PIF_VALUE: 12
PIF_VALUE: 9
PIF_VALUE: 5
PIF_VALUE: 6
PIF_VALUE: 9
PIF_VALUE: 4
PIF_VALUE: 2
PIF_VALUE: 2
PIF_VALUE: 4
PIF_VALUE: 4
PIF_VALUE: 6
PIF_VALUE: 7
PIF_VALUE: 17
PIF_VALUE: 1
PIF_VALUE: 5

## 2019-10-15 ASSESSMENT — ENCOUNTER SYMPTOMS: SHORTNESS OF BREATH: 1

## 2019-10-15 ASSESSMENT — PAIN DESCRIPTION - FREQUENCY: FREQUENCY: CONTINUOUS

## 2019-10-15 ASSESSMENT — PAIN DESCRIPTION - ORIENTATION: ORIENTATION: MID;LOWER

## 2019-10-15 ASSESSMENT — PAIN SCALES - GENERAL
PAINLEVEL_OUTOF10: 9
PAINLEVEL_OUTOF10: 6
PAINLEVEL_OUTOF10: 9
PAINLEVEL_OUTOF10: 8
PAINLEVEL_OUTOF10: 0

## 2019-10-15 ASSESSMENT — PAIN DESCRIPTION - DESCRIPTORS: DESCRIPTORS: DISCOMFORT;CONSTANT

## 2019-10-15 ASSESSMENT — PAIN DESCRIPTION - PROGRESSION: CLINICAL_PROGRESSION: NOT CHANGED

## 2019-10-15 ASSESSMENT — PAIN DESCRIPTION - LOCATION: LOCATION: BACK

## 2019-10-15 ASSESSMENT — PAIN DESCRIPTION - ONSET: ONSET: ON-GOING

## 2019-10-15 ASSESSMENT — PAIN DESCRIPTION - PAIN TYPE: TYPE: CHRONIC PAIN

## 2019-10-16 VITALS
OXYGEN SATURATION: 90 % | WEIGHT: 256.17 LBS | DIASTOLIC BLOOD PRESSURE: 99 MMHG | RESPIRATION RATE: 18 BRPM | HEART RATE: 98 BPM | TEMPERATURE: 97 F | SYSTOLIC BLOOD PRESSURE: 169 MMHG | HEIGHT: 69 IN | BODY MASS INDEX: 37.94 KG/M2

## 2019-10-16 LAB
ACTIVATED CLOTTING TIME: 126 SEC (ref 99–130)
ACTIVATED CLOTTING TIME: 371 SEC (ref 99–130)
ACTIVATED CLOTTING TIME: 96 SEC (ref 99–130)
ANION GAP SERPL CALCULATED.3IONS-SCNC: 13 MMOL/L (ref 3–16)
BUN BLDV-MCNC: 64 MG/DL (ref 7–20)
CALCIUM SERPL-MCNC: 8.7 MG/DL (ref 8.3–10.6)
CHLORIDE BLD-SCNC: 96 MMOL/L (ref 99–110)
CO2: 24 MMOL/L (ref 21–32)
CREAT SERPL-MCNC: 5.3 MG/DL (ref 0.9–1.3)
EKG ATRIAL RATE: 83 BPM
EKG DIAGNOSIS: NORMAL
EKG P AXIS: 53 DEGREES
EKG P-R INTERVAL: 176 MS
EKG Q-T INTERVAL: 406 MS
EKG QRS DURATION: 96 MS
EKG QTC CALCULATION (BAZETT): 477 MS
EKG R AXIS: 10 DEGREES
EKG T AXIS: 75 DEGREES
EKG VENTRICULAR RATE: 83 BPM
GFR AFRICAN AMERICAN: 14
GFR NON-AFRICAN AMERICAN: 11
GLUCOSE BLD-MCNC: 212 MG/DL (ref 70–99)
GLUCOSE BLD-MCNC: 233 MG/DL (ref 70–99)
GLUCOSE BLD-MCNC: 287 MG/DL (ref 70–99)
HCT VFR BLD CALC: 30.8 % (ref 40.5–52.5)
HEMOGLOBIN: 10.3 G/DL (ref 13.5–17.5)
LEFT VENTRICULAR EJECTION FRACTION MODE: NORMAL
LV EF: 55 %
LV EF: 55 %
LVEF MODALITY: NORMAL
MCH RBC QN AUTO: 32.5 PG (ref 26–34)
MCHC RBC AUTO-ENTMCNC: 33.5 G/DL (ref 31–36)
MCV RBC AUTO: 97.1 FL (ref 80–100)
PDW BLD-RTO: 14.2 % (ref 12.4–15.4)
PERFORMED ON: ABNORMAL
PERFORMED ON: ABNORMAL
PLATELET # BLD: 199 K/UL (ref 135–450)
PMV BLD AUTO: 8 FL (ref 5–10.5)
POTASSIUM REFLEX MAGNESIUM: 4.8 MMOL/L (ref 3.5–5.1)
RBC # BLD: 3.18 M/UL (ref 4.2–5.9)
SODIUM BLD-SCNC: 133 MMOL/L (ref 136–145)
WBC # BLD: 9.4 K/UL (ref 4–11)

## 2019-10-16 PROCEDURE — 6360000002 HC RX W HCPCS: Performed by: INTERNAL MEDICINE

## 2019-10-16 PROCEDURE — G0008 ADMIN INFLUENZA VIRUS VAC: HCPCS | Performed by: INTERNAL MEDICINE

## 2019-10-16 PROCEDURE — 93010 ELECTROCARDIOGRAM REPORT: CPT | Performed by: INTERNAL MEDICINE

## 2019-10-16 PROCEDURE — 5A1D70Z PERFORMANCE OF URINARY FILTRATION, INTERMITTENT, LESS THAN 6 HOURS PER DAY: ICD-10-PCS | Performed by: INTERNAL MEDICINE

## 2019-10-16 PROCEDURE — 3600000007 HC SURGERY HYBRID BASE

## 2019-10-16 PROCEDURE — 6370000000 HC RX 637 (ALT 250 FOR IP): Performed by: INTERNAL MEDICINE

## 2019-10-16 PROCEDURE — 99239 HOSP IP/OBS DSCHRG MGMT >30: CPT | Performed by: INTERNAL MEDICINE

## 2019-10-16 PROCEDURE — 3600000017 HC SURGERY HYBRID ADDL 15MIN

## 2019-10-16 PROCEDURE — 2580000003 HC RX 258: Performed by: INTERNAL MEDICINE

## 2019-10-16 PROCEDURE — 90686 IIV4 VACC NO PRSV 0.5 ML IM: CPT | Performed by: INTERNAL MEDICINE

## 2019-10-16 PROCEDURE — 7100000010 HC PHASE II RECOVERY - FIRST 15 MIN

## 2019-10-16 PROCEDURE — 80048 BASIC METABOLIC PNL TOTAL CA: CPT

## 2019-10-16 PROCEDURE — 90935 HEMODIALYSIS ONE EVALUATION: CPT

## 2019-10-16 PROCEDURE — 7100000011 HC PHASE II RECOVERY - ADDTL 15 MIN

## 2019-10-16 PROCEDURE — 93306 TTE W/DOPPLER COMPLETE: CPT

## 2019-10-16 PROCEDURE — 85027 COMPLETE CBC AUTOMATED: CPT

## 2019-10-16 RX ADMIN — PANTOPRAZOLE SODIUM 40 MG: 40 TABLET, DELAYED RELEASE ORAL at 09:02

## 2019-10-16 RX ADMIN — INSULIN LISPRO 2 UNITS: 100 INJECTION, SOLUTION INTRAVENOUS; SUBCUTANEOUS at 14:57

## 2019-10-16 RX ADMIN — CARVEDILOL 12.5 MG: 6.25 TABLET, FILM COATED ORAL at 14:52

## 2019-10-16 RX ADMIN — INSULIN LISPRO 2 UNITS: 100 INJECTION, SOLUTION INTRAVENOUS; SUBCUTANEOUS at 09:05

## 2019-10-16 RX ADMIN — ISOSORBIDE MONONITRATE 30 MG: 30 TABLET, EXTENDED RELEASE ORAL at 14:52

## 2019-10-16 RX ADMIN — DILTIAZEM HYDROCHLORIDE 180 MG: 180 CAPSULE, COATED, EXTENDED RELEASE ORAL at 14:53

## 2019-10-16 RX ADMIN — Medication 10 ML: at 09:02

## 2019-10-16 RX ADMIN — CITALOPRAM HYDROBROMIDE 40 MG: 20 TABLET ORAL at 14:53

## 2019-10-16 RX ADMIN — OXYCODONE HYDROCHLORIDE AND ACETAMINOPHEN 1 TABLET: 7.5; 325 TABLET ORAL at 16:17

## 2019-10-16 RX ADMIN — TORSEMIDE 100 MG: 100 TABLET ORAL at 14:52

## 2019-10-16 RX ADMIN — BUPROPION HYDROCHLORIDE 150 MG: 150 TABLET, FILM COATED, EXTENDED RELEASE ORAL at 14:53

## 2019-10-16 RX ADMIN — INFLUENZA A VIRUS A/BRISBANE/02/2018 IVR-190 (H1N1) ANTIGEN (PROPIOLACTONE INACTIVATED), INFLUENZA A VIRUS A/KANSAS/14/2017 X-327 (H3N2) ANTIGEN (PROPIOLACTONE INACTIVATED), INFLUENZA B VIRUS B/MARYLAND/15/2016 ANTIGEN (PROPIOLACTONE INACTIVATED), INFLUENZA B VIRUS B/PHUKET/3073/2013 BVR-1B ANTIGEN (PROPIOLACTONE INACTIVATED) 0.5 ML: 15; 15; 15; 15 INJECTION, SUSPENSION INTRAMUSCULAR at 09:06

## 2019-10-16 RX ADMIN — LOSARTAN POTASSIUM 50 MG: 25 TABLET ORAL at 14:53

## 2019-10-16 RX ADMIN — CLOPIDOGREL 75 MG: 75 TABLET, FILM COATED ORAL at 14:53

## 2019-10-16 RX ADMIN — OXYCODONE HYDROCHLORIDE AND ACETAMINOPHEN 2 TABLET: 5; 325 TABLET ORAL at 02:48

## 2019-10-16 RX ADMIN — DULOXETINE HYDROCHLORIDE 30 MG: 30 CAPSULE, DELAYED RELEASE ORAL at 14:53

## 2019-10-16 RX ADMIN — ASPIRIN 81 MG: 81 TABLET, COATED ORAL at 14:53

## 2019-10-16 RX ADMIN — TIZANIDINE 4 MG: 4 TABLET ORAL at 14:52

## 2019-10-16 ASSESSMENT — PAIN SCALES - GENERAL
PAINLEVEL_OUTOF10: 0
PAINLEVEL_OUTOF10: 8
PAINLEVEL_OUTOF10: 0
PAINLEVEL_OUTOF10: 7
PAINLEVEL_OUTOF10: 0

## 2019-10-17 ENCOUNTER — TELEPHONE (OUTPATIENT)
Dept: FAMILY MEDICINE CLINIC | Age: 49
End: 2019-10-17

## 2019-10-17 NOTE — TELEPHONE ENCOUNTER
Call from [facility] stating patient was admitted at [facility] 10/15-10/16 for TAVR procedure. Discharged home.       Last visit:  8/21/19

## 2019-10-18 ENCOUNTER — APPOINTMENT (OUTPATIENT)
Dept: CT IMAGING | Age: 49
DRG: 308 | End: 2019-10-18
Payer: COMMERCIAL

## 2019-10-18 ENCOUNTER — HOSPITAL ENCOUNTER (INPATIENT)
Age: 49
LOS: 1 days | Discharge: HOME | DRG: 308 | End: 2019-10-23
Attending: EMERGENCY MEDICINE | Admitting: INTERNAL MEDICINE
Payer: COMMERCIAL

## 2019-10-18 DIAGNOSIS — N18.9 CHRONIC KIDNEY DISEASE, UNSPECIFIED CKD STAGE: ICD-10-CM

## 2019-10-18 DIAGNOSIS — I48.91 ATRIAL FIBRILLATION, UNSPECIFIED TYPE (HCC): ICD-10-CM

## 2019-10-18 DIAGNOSIS — R55 SYNCOPE AND COLLAPSE: Primary | ICD-10-CM

## 2019-10-18 DIAGNOSIS — R55 SYNCOPE, UNSPECIFIED SYNCOPE TYPE: ICD-10-CM

## 2019-10-18 LAB
A/G RATIO: 1.3 (ref 1.1–2.2)
ALBUMIN SERPL-MCNC: 3.8 G/DL (ref 3.4–5)
ALP BLD-CCNC: 109 U/L (ref 40–129)
ALT SERPL-CCNC: <5 U/L (ref 10–40)
ANION GAP SERPL CALCULATED.3IONS-SCNC: 16 MMOL/L (ref 3–16)
AST SERPL-CCNC: 11 U/L (ref 15–37)
BASOPHILS ABSOLUTE: 0 K/UL (ref 0–0.2)
BASOPHILS RELATIVE PERCENT: 0.4 %
BILIRUB SERPL-MCNC: <0.2 MG/DL (ref 0–1)
BUN BLDV-MCNC: 65 MG/DL (ref 7–20)
CALCIUM SERPL-MCNC: 8.8 MG/DL (ref 8.3–10.6)
CHLORIDE BLD-SCNC: 87 MMOL/L (ref 99–110)
CO2: 24 MMOL/L (ref 21–32)
CREAT SERPL-MCNC: 7 MG/DL (ref 0.9–1.3)
EOSINOPHILS ABSOLUTE: 0.3 K/UL (ref 0–0.6)
EOSINOPHILS RELATIVE PERCENT: 3 %
GFR AFRICAN AMERICAN: 10
GFR NON-AFRICAN AMERICAN: 8
GLOBULIN: 3 G/DL
GLUCOSE BLD-MCNC: 273 MG/DL (ref 70–99)
HCT VFR BLD CALC: 27.4 % (ref 40.5–52.5)
HEMOGLOBIN: 9.1 G/DL (ref 13.5–17.5)
LYMPHOCYTES ABSOLUTE: 0.8 K/UL (ref 1–5.1)
LYMPHOCYTES RELATIVE PERCENT: 7 %
MCH RBC QN AUTO: 31.9 PG (ref 26–34)
MCHC RBC AUTO-ENTMCNC: 33.2 G/DL (ref 31–36)
MCV RBC AUTO: 95.9 FL (ref 80–100)
MONOCYTES ABSOLUTE: 0.8 K/UL (ref 0–1.3)
MONOCYTES RELATIVE PERCENT: 7.2 %
NEUTROPHILS ABSOLUTE: 9.5 K/UL (ref 1.7–7.7)
NEUTROPHILS RELATIVE PERCENT: 82.4 %
PDW BLD-RTO: 14.3 % (ref 12.4–15.4)
PLATELET # BLD: 165 K/UL (ref 135–450)
PMV BLD AUTO: 8.2 FL (ref 5–10.5)
POTASSIUM SERPL-SCNC: 5.5 MMOL/L (ref 3.5–5.1)
PRO-BNP: ABNORMAL PG/ML (ref 0–124)
RBC # BLD: 2.85 M/UL (ref 4.2–5.9)
SODIUM BLD-SCNC: 127 MMOL/L (ref 136–145)
TOTAL PROTEIN: 6.8 G/DL (ref 6.4–8.2)
TROPONIN: 0.06 NG/ML
WBC # BLD: 11.5 K/UL (ref 4–11)

## 2019-10-18 PROCEDURE — 94761 N-INVAS EAR/PLS OXIMETRY MLT: CPT

## 2019-10-18 PROCEDURE — 96374 THER/PROPH/DIAG INJ IV PUSH: CPT

## 2019-10-18 PROCEDURE — 96375 TX/PRO/DX INJ NEW DRUG ADDON: CPT

## 2019-10-18 PROCEDURE — 6360000002 HC RX W HCPCS

## 2019-10-18 PROCEDURE — 99285 EMERGENCY DEPT VISIT HI MDM: CPT

## 2019-10-18 PROCEDURE — 83880 ASSAY OF NATRIURETIC PEPTIDE: CPT

## 2019-10-18 PROCEDURE — 72131 CT LUMBAR SPINE W/O DYE: CPT

## 2019-10-18 PROCEDURE — 84484 ASSAY OF TROPONIN QUANT: CPT

## 2019-10-18 PROCEDURE — 85025 COMPLETE CBC W/AUTO DIFF WBC: CPT

## 2019-10-18 PROCEDURE — 94640 AIRWAY INHALATION TREATMENT: CPT

## 2019-10-18 PROCEDURE — 6360000002 HC RX W HCPCS: Performed by: NURSE PRACTITIONER

## 2019-10-18 PROCEDURE — 6360000002 HC RX W HCPCS: Performed by: EMERGENCY MEDICINE

## 2019-10-18 PROCEDURE — 70450 CT HEAD/BRAIN W/O DYE: CPT

## 2019-10-18 PROCEDURE — 6370000000 HC RX 637 (ALT 250 FOR IP): Performed by: NURSE PRACTITIONER

## 2019-10-18 PROCEDURE — 36415 COLL VENOUS BLD VENIPUNCTURE: CPT

## 2019-10-18 PROCEDURE — 80053 COMPREHEN METABOLIC PANEL: CPT

## 2019-10-18 PROCEDURE — 71250 CT THORAX DX C-: CPT

## 2019-10-18 PROCEDURE — 2700000000 HC OXYGEN THERAPY PER DAY

## 2019-10-18 PROCEDURE — 93005 ELECTROCARDIOGRAM TRACING: CPT | Performed by: EMERGENCY MEDICINE

## 2019-10-18 RX ORDER — IPRATROPIUM BROMIDE AND ALBUTEROL SULFATE 2.5; .5 MG/3ML; MG/3ML
1 SOLUTION RESPIRATORY (INHALATION) ONCE
Status: COMPLETED | OUTPATIENT
Start: 2019-10-18 | End: 2019-10-18

## 2019-10-18 RX ORDER — ONDANSETRON 2 MG/ML
INJECTION INTRAMUSCULAR; INTRAVENOUS
Status: COMPLETED
Start: 2019-10-18 | End: 2019-10-18

## 2019-10-18 RX ORDER — METHYLPREDNISOLONE SODIUM SUCCINATE 125 MG/2ML
125 INJECTION, POWDER, LYOPHILIZED, FOR SOLUTION INTRAMUSCULAR; INTRAVENOUS ONCE
Status: COMPLETED | OUTPATIENT
Start: 2019-10-18 | End: 2019-10-18

## 2019-10-18 RX ORDER — ONDANSETRON 2 MG/ML
4 INJECTION INTRAMUSCULAR; INTRAVENOUS ONCE
Status: COMPLETED | OUTPATIENT
Start: 2019-10-18 | End: 2019-10-18

## 2019-10-18 RX ADMIN — IPRATROPIUM BROMIDE AND ALBUTEROL SULFATE 1 AMPULE: .5; 3 SOLUTION RESPIRATORY (INHALATION) at 20:28

## 2019-10-18 RX ADMIN — METHYLPREDNISOLONE SODIUM SUCCINATE 125 MG: 125 INJECTION, POWDER, FOR SOLUTION INTRAMUSCULAR; INTRAVENOUS at 20:51

## 2019-10-18 RX ADMIN — HYDROMORPHONE HYDROCHLORIDE 1 MG: 1 INJECTION, SOLUTION INTRAMUSCULAR; INTRAVENOUS; SUBCUTANEOUS at 20:53

## 2019-10-18 RX ADMIN — ONDANSETRON 4 MG: 2 INJECTION INTRAMUSCULAR; INTRAVENOUS at 20:52

## 2019-10-18 ASSESSMENT — PAIN SCALES - GENERAL
PAINLEVEL_OUTOF10: 6
PAINLEVEL_OUTOF10: 3
PAINLEVEL_OUTOF10: 5

## 2019-10-18 ASSESSMENT — PAIN DESCRIPTION - ORIENTATION: ORIENTATION: RIGHT;LEFT

## 2019-10-18 ASSESSMENT — PAIN DESCRIPTION - LOCATION: LOCATION: KNEE

## 2019-10-19 PROBLEM — R00.1 BRADYCARDIA: Status: ACTIVE | Noted: 2019-10-19

## 2019-10-19 PROBLEM — R55 SYNCOPE AND COLLAPSE: Status: ACTIVE | Noted: 2019-10-19

## 2019-10-19 PROBLEM — R55 SYNCOPE: Status: ACTIVE | Noted: 2019-10-19

## 2019-10-19 PROBLEM — Z95.3 S/P TAVR (TRANSCATHETER AORTIC VALVE REPLACEMENT), BIOPROSTHETIC: Status: ACTIVE | Noted: 2019-10-19

## 2019-10-19 LAB
ANION GAP SERPL CALCULATED.3IONS-SCNC: 19 MMOL/L (ref 3–16)
BASOPHILS ABSOLUTE: 0 K/UL (ref 0–0.2)
BASOPHILS RELATIVE PERCENT: 0.2 %
BLOOD BANK DISPENSE STATUS: NORMAL
BLOOD BANK PRODUCT CODE: NORMAL
BPU ID: NORMAL
BUN BLDV-MCNC: 76 MG/DL (ref 7–20)
CALCIUM SERPL-MCNC: 8.5 MG/DL (ref 8.3–10.6)
CHLORIDE BLD-SCNC: 85 MMOL/L (ref 99–110)
CO2: 18 MMOL/L (ref 21–32)
CREAT SERPL-MCNC: 7 MG/DL (ref 0.9–1.3)
DESCRIPTION BLOOD BANK: NORMAL
EKG ATRIAL RATE: 41 BPM
EKG DIAGNOSIS: NORMAL
EKG Q-T INTERVAL: 480 MS
EKG QRS DURATION: 146 MS
EKG QTC CALCULATION (BAZETT): 463 MS
EKG R AXIS: 133 DEGREES
EKG T AXIS: 0 DEGREES
EKG VENTRICULAR RATE: 56 BPM
EOSINOPHILS ABSOLUTE: 0 K/UL (ref 0–0.6)
EOSINOPHILS RELATIVE PERCENT: 0.1 %
GFR AFRICAN AMERICAN: 10
GFR NON-AFRICAN AMERICAN: 8
GLUCOSE BLD-MCNC: 236 MG/DL (ref 70–99)
GLUCOSE BLD-MCNC: 275 MG/DL (ref 70–99)
GLUCOSE BLD-MCNC: 287 MG/DL (ref 70–99)
GLUCOSE BLD-MCNC: 412 MG/DL (ref 70–99)
GLUCOSE BLD-MCNC: 536 MG/DL (ref 70–99)
GLUCOSE BLD-MCNC: 540 MG/DL (ref 70–99)
GLUCOSE BLD-MCNC: 576 MG/DL (ref 70–99)
GLUCOSE BLD-MCNC: 636 MG/DL (ref 70–99)
GLUCOSE BLD-MCNC: >600 MG/DL (ref 70–99)
GLUCOSE BLD-MCNC: >600 MG/DL (ref 70–99)
HCT VFR BLD CALC: 29 % (ref 40.5–52.5)
HEMOGLOBIN: 9.8 G/DL (ref 13.5–17.5)
LYMPHOCYTES ABSOLUTE: 0.3 K/UL (ref 1–5.1)
LYMPHOCYTES RELATIVE PERCENT: 2.9 %
MAGNESIUM: 2.1 MG/DL (ref 1.8–2.4)
MCH RBC QN AUTO: 33 PG (ref 26–34)
MCHC RBC AUTO-ENTMCNC: 33.7 G/DL (ref 31–36)
MCV RBC AUTO: 97.9 FL (ref 80–100)
MONOCYTES ABSOLUTE: 0.1 K/UL (ref 0–1.3)
MONOCYTES RELATIVE PERCENT: 1 %
NEUTROPHILS ABSOLUTE: 10.9 K/UL (ref 1.7–7.7)
NEUTROPHILS RELATIVE PERCENT: 95.8 %
PDW BLD-RTO: 14.1 % (ref 12.4–15.4)
PERFORMED ON: ABNORMAL
PHOSPHORUS: 4.7 MG/DL (ref 2.5–4.9)
PLATELET # BLD: 162 K/UL (ref 135–450)
PMV BLD AUTO: 8.1 FL (ref 5–10.5)
POTASSIUM SERPL-SCNC: 6.4 MMOL/L (ref 3.5–5.1)
RBC # BLD: 2.96 M/UL (ref 4.2–5.9)
SODIUM BLD-SCNC: 122 MMOL/L (ref 136–145)
WBC # BLD: 11.4 K/UL (ref 4–11)

## 2019-10-19 PROCEDURE — 6370000000 HC RX 637 (ALT 250 FOR IP): Performed by: INTERNAL MEDICINE

## 2019-10-19 PROCEDURE — G0378 HOSPITAL OBSERVATION PER HR: HCPCS

## 2019-10-19 PROCEDURE — 90935 HEMODIALYSIS ONE EVALUATION: CPT

## 2019-10-19 PROCEDURE — 83735 ASSAY OF MAGNESIUM: CPT

## 2019-10-19 PROCEDURE — 94640 AIRWAY INHALATION TREATMENT: CPT

## 2019-10-19 PROCEDURE — 2580000003 HC RX 258: Performed by: INTERNAL MEDICINE

## 2019-10-19 PROCEDURE — 2700000000 HC OXYGEN THERAPY PER DAY

## 2019-10-19 PROCEDURE — 94761 N-INVAS EAR/PLS OXIMETRY MLT: CPT

## 2019-10-19 PROCEDURE — 6370000000 HC RX 637 (ALT 250 FOR IP)

## 2019-10-19 PROCEDURE — 85025 COMPLETE CBC W/AUTO DIFF WBC: CPT

## 2019-10-19 PROCEDURE — 84100 ASSAY OF PHOSPHORUS: CPT

## 2019-10-19 PROCEDURE — 93010 ELECTROCARDIOGRAM REPORT: CPT | Performed by: INTERNAL MEDICINE

## 2019-10-19 PROCEDURE — 99222 1ST HOSP IP/OBS MODERATE 55: CPT | Performed by: INTERNAL MEDICINE

## 2019-10-19 PROCEDURE — 99233 SBSQ HOSP IP/OBS HIGH 50: CPT | Performed by: NURSE PRACTITIONER

## 2019-10-19 PROCEDURE — 80048 BASIC METABOLIC PNL TOTAL CA: CPT

## 2019-10-19 PROCEDURE — 5A1D70Z PERFORMANCE OF URINARY FILTRATION, INTERMITTENT, LESS THAN 6 HOURS PER DAY: ICD-10-PCS | Performed by: INTERNAL MEDICINE

## 2019-10-19 PROCEDURE — 6370000000 HC RX 637 (ALT 250 FOR IP): Performed by: NURSE PRACTITIONER

## 2019-10-19 PROCEDURE — 6360000002 HC RX W HCPCS: Performed by: INTERNAL MEDICINE

## 2019-10-19 PROCEDURE — 82947 ASSAY GLUCOSE BLOOD QUANT: CPT

## 2019-10-19 PROCEDURE — 36415 COLL VENOUS BLD VENIPUNCTURE: CPT

## 2019-10-19 RX ORDER — ACETAMINOPHEN 160 MG/5ML
650 SOLUTION ORAL EVERY 4 HOURS PRN
Status: DISCONTINUED | OUTPATIENT
Start: 2019-10-19 | End: 2019-10-23 | Stop reason: HOSPADM

## 2019-10-19 RX ORDER — INSULIN GLARGINE 100 [IU]/ML
24 INJECTION, SOLUTION SUBCUTANEOUS DAILY
Status: DISCONTINUED | OUTPATIENT
Start: 2019-10-19 | End: 2019-10-23 | Stop reason: HOSPADM

## 2019-10-19 RX ORDER — DULOXETIN HYDROCHLORIDE 30 MG/1
30 CAPSULE, DELAYED RELEASE ORAL DAILY
Status: DISCONTINUED | OUTPATIENT
Start: 2019-10-19 | End: 2019-10-23 | Stop reason: HOSPADM

## 2019-10-19 RX ORDER — ACETAMINOPHEN 650 MG/1
650 SUPPOSITORY RECTAL EVERY 4 HOURS PRN
Status: DISCONTINUED | OUTPATIENT
Start: 2019-10-19 | End: 2019-10-23 | Stop reason: HOSPADM

## 2019-10-19 RX ORDER — PROMETHAZINE HYDROCHLORIDE 6.25 MG/5ML
12.5 SYRUP ORAL EVERY 4 HOURS PRN
Status: DISCONTINUED | OUTPATIENT
Start: 2019-10-19 | End: 2019-10-23 | Stop reason: HOSPADM

## 2019-10-19 RX ORDER — OXYCODONE HYDROCHLORIDE AND ACETAMINOPHEN 5; 325 MG/1; MG/1
1 TABLET ORAL ONCE
Status: COMPLETED | OUTPATIENT
Start: 2019-10-19 | End: 2019-10-19

## 2019-10-19 RX ORDER — DEXTROSE MONOHYDRATE 50 MG/ML
100 INJECTION, SOLUTION INTRAVENOUS PRN
Status: DISCONTINUED | OUTPATIENT
Start: 2019-10-19 | End: 2019-10-23 | Stop reason: HOSPADM

## 2019-10-19 RX ORDER — CLOPIDOGREL BISULFATE 75 MG/1
75 TABLET ORAL DAILY
Status: DISCONTINUED | OUTPATIENT
Start: 2019-10-19 | End: 2019-10-23 | Stop reason: HOSPADM

## 2019-10-19 RX ORDER — SENNA AND DOCUSATE SODIUM 50; 8.6 MG/1; MG/1
1 TABLET, FILM COATED ORAL 2 TIMES DAILY
Status: DISCONTINUED | OUTPATIENT
Start: 2019-10-19 | End: 2019-10-23 | Stop reason: HOSPADM

## 2019-10-19 RX ORDER — SODIUM CHLORIDE 0.9 % (FLUSH) 0.9 %
10 SYRINGE (ML) INJECTION PRN
Status: DISCONTINUED | OUTPATIENT
Start: 2019-10-19 | End: 2019-10-23 | Stop reason: HOSPADM

## 2019-10-19 RX ORDER — ACETAMINOPHEN 325 MG/1
650 TABLET ORAL EVERY 4 HOURS PRN
Status: DISCONTINUED | OUTPATIENT
Start: 2019-10-19 | End: 2019-10-23 | Stop reason: HOSPADM

## 2019-10-19 RX ORDER — PRAVASTATIN SODIUM 80 MG/1
80 TABLET ORAL DAILY
Status: DISCONTINUED | OUTPATIENT
Start: 2019-10-19 | End: 2019-10-23 | Stop reason: HOSPADM

## 2019-10-19 RX ORDER — CALCIUM CARBONATE 200(500)MG
500 TABLET,CHEWABLE ORAL 3 TIMES DAILY PRN
Status: DISCONTINUED | OUTPATIENT
Start: 2019-10-19 | End: 2019-10-23 | Stop reason: HOSPADM

## 2019-10-19 RX ORDER — ISOSORBIDE MONONITRATE 30 MG/1
30 TABLET, EXTENDED RELEASE ORAL DAILY
Status: DISCONTINUED | OUTPATIENT
Start: 2019-10-19 | End: 2019-10-23 | Stop reason: HOSPADM

## 2019-10-19 RX ORDER — HEPARIN SODIUM 1000 [USP'U]/ML
4200 INJECTION, SOLUTION INTRAVENOUS; SUBCUTANEOUS PRN
Status: DISCONTINUED | OUTPATIENT
Start: 2019-10-19 | End: 2019-10-23 | Stop reason: HOSPADM

## 2019-10-19 RX ORDER — DEXTROSE MONOHYDRATE 25 G/50ML
12.5 INJECTION, SOLUTION INTRAVENOUS PRN
Status: DISCONTINUED | OUTPATIENT
Start: 2019-10-19 | End: 2019-10-23 | Stop reason: HOSPADM

## 2019-10-19 RX ORDER — CARVEDILOL 3.12 MG/1
12.5 TABLET ORAL 2 TIMES DAILY WITH MEALS
Status: DISCONTINUED | OUTPATIENT
Start: 2019-10-19 | End: 2019-10-21

## 2019-10-19 RX ORDER — PROMETHAZINE HYDROCHLORIDE 25 MG/1
12.5 TABLET ORAL EVERY 4 HOURS PRN
Status: DISCONTINUED | OUTPATIENT
Start: 2019-10-19 | End: 2019-10-23 | Stop reason: HOSPADM

## 2019-10-19 RX ORDER — ONDANSETRON 4 MG/1
4 TABLET, ORALLY DISINTEGRATING ORAL EVERY 4 HOURS PRN
Status: DISCONTINUED | OUTPATIENT
Start: 2019-10-19 | End: 2019-10-23 | Stop reason: HOSPADM

## 2019-10-19 RX ORDER — SIMETHICONE 80 MG
80 TABLET,CHEWABLE ORAL EVERY 6 HOURS PRN
Status: DISCONTINUED | OUTPATIENT
Start: 2019-10-19 | End: 2019-10-23 | Stop reason: HOSPADM

## 2019-10-19 RX ORDER — SODIUM CHLORIDE 0.9 % (FLUSH) 0.9 %
10 SYRINGE (ML) INJECTION 2 TIMES DAILY
Status: DISCONTINUED | OUTPATIENT
Start: 2019-10-19 | End: 2019-10-23 | Stop reason: HOSPADM

## 2019-10-19 RX ORDER — NICOTINE POLACRILEX 4 MG
15 LOZENGE BUCCAL PRN
Status: DISCONTINUED | OUTPATIENT
Start: 2019-10-19 | End: 2019-10-23 | Stop reason: HOSPADM

## 2019-10-19 RX ORDER — CITALOPRAM 20 MG/1
40 TABLET ORAL DAILY
Status: DISCONTINUED | OUTPATIENT
Start: 2019-10-19 | End: 2019-10-23 | Stop reason: HOSPADM

## 2019-10-19 RX ORDER — ASPIRIN 81 MG/1
81 TABLET, CHEWABLE ORAL DAILY
Status: DISCONTINUED | OUTPATIENT
Start: 2019-10-19 | End: 2019-10-23 | Stop reason: HOSPADM

## 2019-10-19 RX ORDER — MAGNESIUM SULFATE 1 G/100ML
1 INJECTION INTRAVENOUS PRN
Status: DISCONTINUED | OUTPATIENT
Start: 2019-10-19 | End: 2019-10-23 | Stop reason: HOSPADM

## 2019-10-19 RX ORDER — OXYCODONE HYDROCHLORIDE AND ACETAMINOPHEN 5; 325 MG/1; MG/1
1 TABLET ORAL EVERY 4 HOURS PRN
Status: DISCONTINUED | OUTPATIENT
Start: 2019-10-19 | End: 2019-10-23 | Stop reason: HOSPADM

## 2019-10-19 RX ORDER — PANTOPRAZOLE SODIUM 40 MG/1
40 TABLET, DELAYED RELEASE ORAL
Status: DISCONTINUED | OUTPATIENT
Start: 2019-10-19 | End: 2019-10-23 | Stop reason: HOSPADM

## 2019-10-19 RX ORDER — ONDANSETRON 2 MG/ML
4 INJECTION INTRAMUSCULAR; INTRAVENOUS EVERY 4 HOURS PRN
Status: DISCONTINUED | OUTPATIENT
Start: 2019-10-19 | End: 2019-10-23 | Stop reason: HOSPADM

## 2019-10-19 RX ORDER — PROMETHAZINE HYDROCHLORIDE 25 MG/ML
12.5 INJECTION, SOLUTION INTRAMUSCULAR; INTRAVENOUS EVERY 4 HOURS PRN
Status: DISCONTINUED | OUTPATIENT
Start: 2019-10-19 | End: 2019-10-23 | Stop reason: HOSPADM

## 2019-10-19 RX ORDER — POTASSIUM CHLORIDE 20 MEQ/1
40 TABLET, EXTENDED RELEASE ORAL PRN
Status: DISCONTINUED | OUTPATIENT
Start: 2019-10-19 | End: 2019-10-22

## 2019-10-19 RX ORDER — POTASSIUM CHLORIDE 7.45 MG/ML
10 INJECTION INTRAVENOUS PRN
Status: DISCONTINUED | OUTPATIENT
Start: 2019-10-19 | End: 2019-10-22

## 2019-10-19 RX ORDER — OXYCODONE HYDROCHLORIDE AND ACETAMINOPHEN 5; 325 MG/1; MG/1
TABLET ORAL
Status: COMPLETED
Start: 2019-10-19 | End: 2019-10-19

## 2019-10-19 RX ADMIN — GLYCOPYRROLATE AND FORMOTEROL FUMARATE 2 PUFF: 9; 4.8 AEROSOL, METERED RESPIRATORY (INHALATION) at 20:03

## 2019-10-19 RX ADMIN — INSULIN LISPRO 3 UNITS: 100 INJECTION, SOLUTION INTRAVENOUS; SUBCUTANEOUS at 20:46

## 2019-10-19 RX ADMIN — ISOSORBIDE MONONITRATE 30 MG: 30 TABLET, EXTENDED RELEASE ORAL at 08:29

## 2019-10-19 RX ADMIN — CLOPIDOGREL BISULFATE 75 MG: 75 TABLET ORAL at 08:28

## 2019-10-19 RX ADMIN — PRAVASTATIN SODIUM 80 MG: 80 TABLET ORAL at 08:28

## 2019-10-19 RX ADMIN — INSULIN GLARGINE 24 UNITS: 100 INJECTION, SOLUTION SUBCUTANEOUS at 05:58

## 2019-10-19 RX ADMIN — DARBEPOETIN ALFA 60 MCG: 60 INJECTION, SOLUTION INTRAVENOUS; SUBCUTANEOUS at 14:27

## 2019-10-19 RX ADMIN — DULOXETINE HYDROCHLORIDE 30 MG: 30 CAPSULE, DELAYED RELEASE ORAL at 08:28

## 2019-10-19 RX ADMIN — INSULIN LISPRO 20 UNITS: 100 INJECTION, SOLUTION INTRAVENOUS; SUBCUTANEOUS at 10:45

## 2019-10-19 RX ADMIN — INSULIN LISPRO 7 UNITS: 100 INJECTION, SOLUTION INTRAVENOUS; SUBCUTANEOUS at 08:24

## 2019-10-19 RX ADMIN — OXYCODONE HYDROCHLORIDE AND ACETAMINOPHEN 1 TABLET: 5; 325 TABLET ORAL at 09:38

## 2019-10-19 RX ADMIN — OXYCODONE HYDROCHLORIDE AND ACETAMINOPHEN 1 TABLET: 5; 325 TABLET ORAL at 03:17

## 2019-10-19 RX ADMIN — INSULIN LISPRO 6 UNITS: 100 INJECTION, SOLUTION INTRAVENOUS; SUBCUTANEOUS at 17:48

## 2019-10-19 RX ADMIN — INSULIN LISPRO 12 UNITS: 100 INJECTION, SOLUTION INTRAVENOUS; SUBCUTANEOUS at 08:25

## 2019-10-19 RX ADMIN — ANTACID TABLETS 500 MG: 500 TABLET, CHEWABLE ORAL at 08:46

## 2019-10-19 RX ADMIN — SENNOSIDES, DOCUSATE SODIUM 1 TABLET: 50; 8.6 TABLET, FILM COATED ORAL at 22:17

## 2019-10-19 RX ADMIN — OXYCODONE HYDROCHLORIDE AND ACETAMINOPHEN 1 TABLET: 5; 325 TABLET ORAL at 15:28

## 2019-10-19 RX ADMIN — Medication 10 ML: at 20:49

## 2019-10-19 RX ADMIN — HEPARIN SODIUM 4200 UNITS: 1000 INJECTION, SOLUTION INTRAVENOUS; SUBCUTANEOUS at 14:29

## 2019-10-19 RX ADMIN — OXYCODONE HYDROCHLORIDE AND ACETAMINOPHEN 1 TABLET: 5; 325 TABLET ORAL at 22:16

## 2019-10-19 RX ADMIN — ASPIRIN 81 MG 81 MG: 81 TABLET ORAL at 08:28

## 2019-10-19 RX ADMIN — INSULIN LISPRO 7 UNITS: 100 INJECTION, SOLUTION INTRAVENOUS; SUBCUTANEOUS at 17:46

## 2019-10-19 RX ADMIN — INSULIN LISPRO 7 UNITS: 100 INJECTION, SOLUTION INTRAVENOUS; SUBCUTANEOUS at 15:17

## 2019-10-19 RX ADMIN — INSULIN LISPRO 4 UNITS: 100 INJECTION, SOLUTION INTRAVENOUS; SUBCUTANEOUS at 15:18

## 2019-10-19 RX ADMIN — GLYCOPYRROLATE AND FORMOTEROL FUMARATE 2 PUFF: 9; 4.8 AEROSOL, METERED RESPIRATORY (INHALATION) at 09:31

## 2019-10-19 RX ADMIN — PANTOPRAZOLE SODIUM 40 MG: 40 TABLET, DELAYED RELEASE ORAL at 08:28

## 2019-10-19 RX ADMIN — CITALOPRAM HYDROBROMIDE 40 MG: 20 TABLET ORAL at 08:28

## 2019-10-19 ASSESSMENT — PAIN DESCRIPTION - PROGRESSION: CLINICAL_PROGRESSION: GRADUALLY IMPROVING

## 2019-10-19 ASSESSMENT — PAIN DESCRIPTION - LOCATION
LOCATION: BACK

## 2019-10-19 ASSESSMENT — PAIN DESCRIPTION - ORIENTATION
ORIENTATION: LOWER;MID
ORIENTATION: LOWER
ORIENTATION: LOWER

## 2019-10-19 ASSESSMENT — PAIN SCALES - GENERAL
PAINLEVEL_OUTOF10: 8
PAINLEVEL_OUTOF10: 8
PAINLEVEL_OUTOF10: 9
PAINLEVEL_OUTOF10: 8
PAINLEVEL_OUTOF10: 7
PAINLEVEL_OUTOF10: 7
PAINLEVEL_OUTOF10: 0
PAINLEVEL_OUTOF10: 8

## 2019-10-19 ASSESSMENT — PAIN DESCRIPTION - DESCRIPTORS: DESCRIPTORS: DISCOMFORT;STABBING

## 2019-10-19 ASSESSMENT — PAIN DESCRIPTION - ONSET: ONSET: ON-GOING

## 2019-10-19 ASSESSMENT — PAIN DESCRIPTION - PAIN TYPE
TYPE: CHRONIC PAIN
TYPE: CHRONIC PAIN

## 2019-10-19 ASSESSMENT — PAIN DESCRIPTION - FREQUENCY: FREQUENCY: CONTINUOUS

## 2019-10-20 LAB
ANION GAP SERPL CALCULATED.3IONS-SCNC: 22 MMOL/L (ref 3–16)
BASOPHILS ABSOLUTE: 0 K/UL (ref 0–0.2)
BASOPHILS RELATIVE PERCENT: 0.2 %
BUN BLDV-MCNC: 58 MG/DL (ref 7–20)
CALCIUM SERPL-MCNC: 10.1 MG/DL (ref 8.3–10.6)
CHLORIDE BLD-SCNC: 81 MMOL/L (ref 99–110)
CO2: 21 MMOL/L (ref 21–32)
CREAT SERPL-MCNC: 5.5 MG/DL (ref 0.9–1.3)
EOSINOPHILS ABSOLUTE: 0 K/UL (ref 0–0.6)
EOSINOPHILS RELATIVE PERCENT: 0.2 %
GFR AFRICAN AMERICAN: 13
GFR NON-AFRICAN AMERICAN: 11
GLUCOSE BLD-MCNC: 274 MG/DL (ref 70–99)
GLUCOSE BLD-MCNC: 301 MG/DL (ref 70–99)
GLUCOSE BLD-MCNC: 357 MG/DL (ref 70–99)
GLUCOSE BLD-MCNC: 374 MG/DL (ref 70–99)
GLUCOSE BLD-MCNC: 390 MG/DL (ref 70–99)
HCT VFR BLD CALC: 37.8 % (ref 40.5–52.5)
HEMOGLOBIN: 12.8 G/DL (ref 13.5–17.5)
LYMPHOCYTES ABSOLUTE: 1.1 K/UL (ref 1–5.1)
LYMPHOCYTES RELATIVE PERCENT: 4.5 %
MAGNESIUM: 2.1 MG/DL (ref 1.8–2.4)
MCH RBC QN AUTO: 32.3 PG (ref 26–34)
MCHC RBC AUTO-ENTMCNC: 33.9 G/DL (ref 31–36)
MCV RBC AUTO: 95.3 FL (ref 80–100)
MONOCYTES ABSOLUTE: 1.5 K/UL (ref 0–1.3)
MONOCYTES RELATIVE PERCENT: 6.2 %
NEUTROPHILS ABSOLUTE: 21.3 K/UL (ref 1.7–7.7)
NEUTROPHILS RELATIVE PERCENT: 88.9 %
PDW BLD-RTO: 14.4 % (ref 12.4–15.4)
PERFORMED ON: ABNORMAL
PHOSPHORUS: 4.9 MG/DL (ref 2.5–4.9)
PLATELET # BLD: 274 K/UL (ref 135–450)
PMV BLD AUTO: 7.7 FL (ref 5–10.5)
POTASSIUM SERPL-SCNC: 4.6 MMOL/L (ref 3.5–5.1)
RBC # BLD: 3.97 M/UL (ref 4.2–5.9)
SODIUM BLD-SCNC: 124 MMOL/L (ref 136–145)
WBC # BLD: 23.9 K/UL (ref 4–11)

## 2019-10-20 PROCEDURE — 2580000003 HC RX 258

## 2019-10-20 PROCEDURE — 96365 THER/PROPH/DIAG IV INF INIT: CPT

## 2019-10-20 PROCEDURE — 83735 ASSAY OF MAGNESIUM: CPT

## 2019-10-20 PROCEDURE — 2500000003 HC RX 250 WO HCPCS: Performed by: INTERNAL MEDICINE

## 2019-10-20 PROCEDURE — 6360000002 HC RX W HCPCS: Performed by: INTERNAL MEDICINE

## 2019-10-20 PROCEDURE — G0378 HOSPITAL OBSERVATION PER HR: HCPCS

## 2019-10-20 PROCEDURE — 96376 TX/PRO/DX INJ SAME DRUG ADON: CPT

## 2019-10-20 PROCEDURE — 6370000000 HC RX 637 (ALT 250 FOR IP): Performed by: INTERNAL MEDICINE

## 2019-10-20 PROCEDURE — 80048 BASIC METABOLIC PNL TOTAL CA: CPT

## 2019-10-20 PROCEDURE — 94761 N-INVAS EAR/PLS OXIMETRY MLT: CPT

## 2019-10-20 PROCEDURE — 2580000003 HC RX 258: Performed by: INTERNAL MEDICINE

## 2019-10-20 PROCEDURE — 84100 ASSAY OF PHOSPHORUS: CPT

## 2019-10-20 PROCEDURE — 99232 SBSQ HOSP IP/OBS MODERATE 35: CPT | Performed by: NURSE PRACTITIONER

## 2019-10-20 PROCEDURE — 36415 COLL VENOUS BLD VENIPUNCTURE: CPT

## 2019-10-20 PROCEDURE — 2700000000 HC OXYGEN THERAPY PER DAY

## 2019-10-20 PROCEDURE — 96367 TX/PROPH/DG ADDL SEQ IV INF: CPT

## 2019-10-20 PROCEDURE — 85025 COMPLETE CBC W/AUTO DIFF WBC: CPT

## 2019-10-20 PROCEDURE — 96366 THER/PROPH/DIAG IV INF ADDON: CPT

## 2019-10-20 RX ORDER — CIPROFLOXACIN 2 MG/ML
200 INJECTION, SOLUTION INTRAVENOUS EVERY 12 HOURS
Status: DISCONTINUED | OUTPATIENT
Start: 2019-10-20 | End: 2019-10-21

## 2019-10-20 RX ORDER — SODIUM CHLORIDE 9 MG/ML
INJECTION, SOLUTION INTRAVENOUS
Status: COMPLETED
Start: 2019-10-20 | End: 2019-10-20

## 2019-10-20 RX ADMIN — PANTOPRAZOLE SODIUM 40 MG: 40 TABLET, DELAYED RELEASE ORAL at 06:25

## 2019-10-20 RX ADMIN — Medication 10 ML: at 21:12

## 2019-10-20 RX ADMIN — PROMETHAZINE HYDROCHLORIDE 12.5 MG: 25 TABLET ORAL at 06:25

## 2019-10-20 RX ADMIN — SODIUM CHLORIDE 250 ML: 9 INJECTION, SOLUTION INTRAVENOUS at 13:35

## 2019-10-20 RX ADMIN — INSULIN GLARGINE 24 UNITS: 100 INJECTION, SOLUTION SUBCUTANEOUS at 11:14

## 2019-10-20 RX ADMIN — METRONIDAZOLE 500 MG: 500 INJECTION, SOLUTION INTRAVENOUS at 13:35

## 2019-10-20 RX ADMIN — METRONIDAZOLE 500 MG: 500 INJECTION, SOLUTION INTRAVENOUS at 21:12

## 2019-10-20 RX ADMIN — Medication 10 ML: at 20:55

## 2019-10-20 RX ADMIN — Medication 10 ML: at 10:04

## 2019-10-20 RX ADMIN — INSULIN LISPRO 3 UNITS: 100 INJECTION, SOLUTION INTRAVENOUS; SUBCUTANEOUS at 20:50

## 2019-10-20 RX ADMIN — CIPROFLOXACIN 200 MG: 2 INJECTION, SOLUTION INTRAVENOUS at 14:45

## 2019-10-20 RX ADMIN — ONDANSETRON 4 MG: 2 INJECTION INTRAMUSCULAR; INTRAVENOUS at 09:58

## 2019-10-20 RX ADMIN — Medication 2 MG: at 01:18

## 2019-10-20 ASSESSMENT — PAIN SCALES - GENERAL
PAINLEVEL_OUTOF10: 4
PAINLEVEL_OUTOF10: 5
PAINLEVEL_OUTOF10: 6
PAINLEVEL_OUTOF10: 5

## 2019-10-20 ASSESSMENT — PAIN DESCRIPTION - DESCRIPTORS: DESCRIPTORS: CRAMPING;DISCOMFORT

## 2019-10-20 ASSESSMENT — PAIN DESCRIPTION - PAIN TYPE
TYPE: CHRONIC PAIN;ACUTE PAIN
TYPE: CHRONIC PAIN

## 2019-10-20 ASSESSMENT — PAIN DESCRIPTION - LOCATION
LOCATION: BACK;ABDOMEN
LOCATION: ABDOMEN;BACK

## 2019-10-20 ASSESSMENT — PAIN DESCRIPTION - ORIENTATION: ORIENTATION: LOWER;MID

## 2019-10-20 ASSESSMENT — PAIN DESCRIPTION - FREQUENCY: FREQUENCY: CONTINUOUS

## 2019-10-21 LAB
ANION GAP SERPL CALCULATED.3IONS-SCNC: 19 MMOL/L (ref 3–16)
BASOPHILS ABSOLUTE: 0 K/UL (ref 0–0.2)
BASOPHILS RELATIVE PERCENT: 0.3 %
BUN BLDV-MCNC: 75 MG/DL (ref 7–20)
CALCIUM SERPL-MCNC: 8.8 MG/DL (ref 8.3–10.6)
CHLORIDE BLD-SCNC: 88 MMOL/L (ref 99–110)
CO2: 22 MMOL/L (ref 21–32)
CREAT SERPL-MCNC: 5.9 MG/DL (ref 0.9–1.3)
EOSINOPHILS ABSOLUTE: 0.3 K/UL (ref 0–0.6)
EOSINOPHILS RELATIVE PERCENT: 2.1 %
GFR AFRICAN AMERICAN: 12
GFR NON-AFRICAN AMERICAN: 10
GLUCOSE BLD-MCNC: 187 MG/DL (ref 70–99)
GLUCOSE BLD-MCNC: 206 MG/DL (ref 70–99)
GLUCOSE BLD-MCNC: 344 MG/DL (ref 70–99)
GLUCOSE BLD-MCNC: 361 MG/DL (ref 70–99)
HCT VFR BLD CALC: 35.2 % (ref 40.5–52.5)
HEMOGLOBIN: 12.3 G/DL (ref 13.5–17.5)
LYMPHOCYTES ABSOLUTE: 0.8 K/UL (ref 1–5.1)
LYMPHOCYTES RELATIVE PERCENT: 5.9 %
MAGNESIUM: 1.9 MG/DL (ref 1.8–2.4)
MCH RBC QN AUTO: 32.4 PG (ref 26–34)
MCHC RBC AUTO-ENTMCNC: 34.8 G/DL (ref 31–36)
MCV RBC AUTO: 92.9 FL (ref 80–100)
MONOCYTES ABSOLUTE: 1 K/UL (ref 0–1.3)
MONOCYTES RELATIVE PERCENT: 7.2 %
NEUTROPHILS ABSOLUTE: 11.9 K/UL (ref 1.7–7.7)
NEUTROPHILS RELATIVE PERCENT: 84.5 %
PDW BLD-RTO: 14.2 % (ref 12.4–15.4)
PERFORMED ON: ABNORMAL
PHOSPHORUS: 5.5 MG/DL (ref 2.5–4.9)
PLATELET # BLD: 215 K/UL (ref 135–450)
PMV BLD AUTO: 7.4 FL (ref 5–10.5)
POTASSIUM SERPL-SCNC: 3.9 MMOL/L (ref 3.5–5.1)
RBC # BLD: 3.79 M/UL (ref 4.2–5.9)
SODIUM BLD-SCNC: 129 MMOL/L (ref 136–145)
WBC # BLD: 14.1 K/UL (ref 4–11)

## 2019-10-21 PROCEDURE — 94761 N-INVAS EAR/PLS OXIMETRY MLT: CPT

## 2019-10-21 PROCEDURE — 36415 COLL VENOUS BLD VENIPUNCTURE: CPT

## 2019-10-21 PROCEDURE — 2700000000 HC OXYGEN THERAPY PER DAY

## 2019-10-21 PROCEDURE — 6370000000 HC RX 637 (ALT 250 FOR IP): Performed by: INTERNAL MEDICINE

## 2019-10-21 PROCEDURE — 6360000002 HC RX W HCPCS: Performed by: INTERNAL MEDICINE

## 2019-10-21 PROCEDURE — 96376 TX/PRO/DX INJ SAME DRUG ADON: CPT

## 2019-10-21 PROCEDURE — 90935 HEMODIALYSIS ONE EVALUATION: CPT

## 2019-10-21 PROCEDURE — 2500000003 HC RX 250 WO HCPCS: Performed by: INTERNAL MEDICINE

## 2019-10-21 PROCEDURE — 94640 AIRWAY INHALATION TREATMENT: CPT

## 2019-10-21 PROCEDURE — 93925 LOWER EXTREMITY STUDY: CPT

## 2019-10-21 PROCEDURE — 84100 ASSAY OF PHOSPHORUS: CPT

## 2019-10-21 PROCEDURE — 80048 BASIC METABOLIC PNL TOTAL CA: CPT

## 2019-10-21 PROCEDURE — G0378 HOSPITAL OBSERVATION PER HR: HCPCS

## 2019-10-21 PROCEDURE — 6360000002 HC RX W HCPCS: Performed by: NURSE PRACTITIONER

## 2019-10-21 PROCEDURE — 83735 ASSAY OF MAGNESIUM: CPT

## 2019-10-21 PROCEDURE — 99223 1ST HOSP IP/OBS HIGH 75: CPT | Performed by: INTERNAL MEDICINE

## 2019-10-21 PROCEDURE — 2580000003 HC RX 258: Performed by: INTERNAL MEDICINE

## 2019-10-21 PROCEDURE — 96366 THER/PROPH/DIAG IV INF ADDON: CPT

## 2019-10-21 PROCEDURE — 85025 COMPLETE CBC W/AUTO DIFF WBC: CPT

## 2019-10-21 RX ORDER — HYDROMORPHONE HCL 110MG/55ML
0.5 PATIENT CONTROLLED ANALGESIA SYRINGE INTRAVENOUS
Status: DISCONTINUED | OUTPATIENT
Start: 2019-10-21 | End: 2019-10-23 | Stop reason: HOSPADM

## 2019-10-21 RX ORDER — CIPROFLOXACIN 2 MG/ML
400 INJECTION, SOLUTION INTRAVENOUS EVERY 24 HOURS
Status: DISCONTINUED | OUTPATIENT
Start: 2019-10-22 | End: 2019-10-23

## 2019-10-21 RX ADMIN — METRONIDAZOLE 500 MG: 500 INJECTION, SOLUTION INTRAVENOUS at 14:11

## 2019-10-21 RX ADMIN — METRONIDAZOLE 500 MG: 500 INJECTION, SOLUTION INTRAVENOUS at 22:36

## 2019-10-21 RX ADMIN — HYDROMORPHONE HYDROCHLORIDE 0.5 MG: 2 INJECTION, SOLUTION INTRAMUSCULAR; INTRAVENOUS; SUBCUTANEOUS at 22:39

## 2019-10-21 RX ADMIN — OXYCODONE HYDROCHLORIDE AND ACETAMINOPHEN 1 TABLET: 5; 325 TABLET ORAL at 14:11

## 2019-10-21 RX ADMIN — CIPROFLOXACIN 200 MG: 2 INJECTION, SOLUTION INTRAVENOUS at 02:02

## 2019-10-21 RX ADMIN — ONDANSETRON 4 MG: 2 INJECTION INTRAMUSCULAR; INTRAVENOUS at 11:51

## 2019-10-21 RX ADMIN — HYDROMORPHONE HYDROCHLORIDE 0.5 MG: 2 INJECTION, SOLUTION INTRAMUSCULAR; INTRAVENOUS; SUBCUTANEOUS at 17:32

## 2019-10-21 RX ADMIN — INSULIN LISPRO 6 UNITS: 100 INJECTION, SOLUTION INTRAVENOUS; SUBCUTANEOUS at 17:38

## 2019-10-21 RX ADMIN — INSULIN LISPRO 4 UNITS: 100 INJECTION, SOLUTION INTRAVENOUS; SUBCUTANEOUS at 22:42

## 2019-10-21 RX ADMIN — HYDROMORPHONE HYDROCHLORIDE 0.5 MG: 2 INJECTION, SOLUTION INTRAMUSCULAR; INTRAVENOUS; SUBCUTANEOUS at 12:34

## 2019-10-21 RX ADMIN — GLYCOPYRROLATE AND FORMOTEROL FUMARATE 2 PUFF: 9; 4.8 AEROSOL, METERED RESPIRATORY (INHALATION) at 20:18

## 2019-10-21 RX ADMIN — OXYCODONE HYDROCHLORIDE AND ACETAMINOPHEN 1 TABLET: 5; 325 TABLET ORAL at 19:42

## 2019-10-21 RX ADMIN — Medication 10 ML: at 19:43

## 2019-10-21 RX ADMIN — INSULIN GLARGINE 24 UNITS: 100 INJECTION, SOLUTION SUBCUTANEOUS at 14:26

## 2019-10-21 RX ADMIN — ONDANSETRON 4 MG: 2 INJECTION INTRAMUSCULAR; INTRAVENOUS at 19:42

## 2019-10-21 RX ADMIN — PANTOPRAZOLE SODIUM 40 MG: 40 TABLET, DELAYED RELEASE ORAL at 05:55

## 2019-10-21 RX ADMIN — Medication 10 ML: at 15:48

## 2019-10-21 RX ADMIN — SIMETHICONE 80 MG: 80 TABLET, CHEWABLE ORAL at 12:38

## 2019-10-21 RX ADMIN — METRONIDAZOLE 500 MG: 500 INJECTION, SOLUTION INTRAVENOUS at 05:55

## 2019-10-21 ASSESSMENT — PAIN SCALES - GENERAL
PAINLEVEL_OUTOF10: 10
PAINLEVEL_OUTOF10: 10
PAINLEVEL_OUTOF10: 0
PAINLEVEL_OUTOF10: 0
PAINLEVEL_OUTOF10: 10
PAINLEVEL_OUTOF10: 0
PAINLEVEL_OUTOF10: 9
PAINLEVEL_OUTOF10: 10

## 2019-10-21 ASSESSMENT — PAIN DESCRIPTION - LOCATION
LOCATION: BACK
LOCATION: BACK

## 2019-10-21 ASSESSMENT — PAIN DESCRIPTION - DESCRIPTORS
DESCRIPTORS: ACHING
DESCRIPTORS: ACHING

## 2019-10-21 ASSESSMENT — PAIN DESCRIPTION - ORIENTATION
ORIENTATION: LOWER
ORIENTATION: LOWER

## 2019-10-21 ASSESSMENT — PAIN DESCRIPTION - ONSET: ONSET: ON-GOING

## 2019-10-21 ASSESSMENT — PAIN DESCRIPTION - FREQUENCY
FREQUENCY: CONTINUOUS
FREQUENCY: CONTINUOUS

## 2019-10-21 ASSESSMENT — PAIN DESCRIPTION - PAIN TYPE
TYPE: CHRONIC PAIN
TYPE: CHRONIC PAIN

## 2019-10-21 ASSESSMENT — PAIN - FUNCTIONAL ASSESSMENT: PAIN_FUNCTIONAL_ASSESSMENT: ACTIVITIES ARE NOT PREVENTED

## 2019-10-22 ENCOUNTER — APPOINTMENT (OUTPATIENT)
Dept: GENERAL RADIOLOGY | Age: 49
DRG: 308 | End: 2019-10-22
Payer: COMMERCIAL

## 2019-10-22 ENCOUNTER — APPOINTMENT (OUTPATIENT)
Dept: CT IMAGING | Age: 49
DRG: 308 | End: 2019-10-22
Payer: COMMERCIAL

## 2019-10-22 ENCOUNTER — APPOINTMENT (OUTPATIENT)
Dept: ULTRASOUND IMAGING | Age: 49
DRG: 308 | End: 2019-10-22
Payer: COMMERCIAL

## 2019-10-22 LAB
ANION GAP SERPL CALCULATED.3IONS-SCNC: 18 MMOL/L (ref 3–16)
BASOPHILS ABSOLUTE: 0 K/UL (ref 0–0.2)
BASOPHILS RELATIVE PERCENT: 0.4 %
BUN BLDV-MCNC: 59 MG/DL (ref 7–20)
CALCIUM SERPL-MCNC: 8.6 MG/DL (ref 8.3–10.6)
CHLORIDE BLD-SCNC: 87 MMOL/L (ref 99–110)
CO2: 24 MMOL/L (ref 21–32)
CREAT SERPL-MCNC: 5 MG/DL (ref 0.9–1.3)
EOSINOPHILS ABSOLUTE: 0.3 K/UL (ref 0–0.6)
EOSINOPHILS RELATIVE PERCENT: 2.6 %
GFR AFRICAN AMERICAN: 15
GFR NON-AFRICAN AMERICAN: 12
GLUCOSE BLD-MCNC: 177 MG/DL (ref 70–99)
GLUCOSE BLD-MCNC: 207 MG/DL (ref 70–99)
GLUCOSE BLD-MCNC: 210 MG/DL (ref 70–99)
GLUCOSE BLD-MCNC: 212 MG/DL (ref 70–99)
GLUCOSE BLD-MCNC: 268 MG/DL (ref 70–99)
HCT VFR BLD CALC: 34.8 % (ref 40.5–52.5)
HEMOGLOBIN: 12.1 G/DL (ref 13.5–17.5)
LYMPHOCYTES ABSOLUTE: 1.2 K/UL (ref 1–5.1)
LYMPHOCYTES RELATIVE PERCENT: 9.6 %
MAGNESIUM: 2 MG/DL (ref 1.8–2.4)
MCH RBC QN AUTO: 32.2 PG (ref 26–34)
MCHC RBC AUTO-ENTMCNC: 34.7 G/DL (ref 31–36)
MCV RBC AUTO: 92.8 FL (ref 80–100)
MONOCYTES ABSOLUTE: 1 K/UL (ref 0–1.3)
MONOCYTES RELATIVE PERCENT: 8.6 %
NEUTROPHILS ABSOLUTE: 9.6 K/UL (ref 1.7–7.7)
NEUTROPHILS RELATIVE PERCENT: 78.8 %
PDW BLD-RTO: 14 % (ref 12.4–15.4)
PERFORMED ON: ABNORMAL
PHOSPHORUS: 5.8 MG/DL (ref 2.5–4.9)
PLATELET # BLD: 231 K/UL (ref 135–450)
PMV BLD AUTO: 7.5 FL (ref 5–10.5)
POTASSIUM SERPL-SCNC: 3.6 MMOL/L (ref 3.5–5.1)
RBC # BLD: 3.75 M/UL (ref 4.2–5.9)
SODIUM BLD-SCNC: 129 MMOL/L (ref 136–145)
WBC # BLD: 12.2 K/UL (ref 4–11)

## 2019-10-22 PROCEDURE — 6360000002 HC RX W HCPCS: Performed by: INTERNAL MEDICINE

## 2019-10-22 PROCEDURE — 6360000004 HC RX CONTRAST MEDICATION: Performed by: NURSE PRACTITIONER

## 2019-10-22 PROCEDURE — 2500000003 HC RX 250 WO HCPCS: Performed by: INTERNAL MEDICINE

## 2019-10-22 PROCEDURE — 6370000000 HC RX 637 (ALT 250 FOR IP): Performed by: INTERNAL MEDICINE

## 2019-10-22 PROCEDURE — 76705 ECHO EXAM OF ABDOMEN: CPT

## 2019-10-22 PROCEDURE — 85025 COMPLETE CBC W/AUTO DIFF WBC: CPT

## 2019-10-22 PROCEDURE — 93005 ELECTROCARDIOGRAM TRACING: CPT | Performed by: NURSE PRACTITIONER

## 2019-10-22 PROCEDURE — 80048 BASIC METABOLIC PNL TOTAL CA: CPT

## 2019-10-22 PROCEDURE — 74177 CT ABD & PELVIS W/CONTRAST: CPT

## 2019-10-22 PROCEDURE — 99233 SBSQ HOSP IP/OBS HIGH 50: CPT | Performed by: INTERNAL MEDICINE

## 2019-10-22 PROCEDURE — 6370000000 HC RX 637 (ALT 250 FOR IP): Performed by: NURSE PRACTITIONER

## 2019-10-22 PROCEDURE — 96366 THER/PROPH/DIAG IV INF ADDON: CPT

## 2019-10-22 PROCEDURE — 96376 TX/PRO/DX INJ SAME DRUG ADON: CPT

## 2019-10-22 PROCEDURE — 6360000002 HC RX W HCPCS: Performed by: NURSE PRACTITIONER

## 2019-10-22 PROCEDURE — 6360000004 HC RX CONTRAST MEDICATION: Performed by: INTERNAL MEDICINE

## 2019-10-22 PROCEDURE — 84100 ASSAY OF PHOSPHORUS: CPT

## 2019-10-22 PROCEDURE — 2580000003 HC RX 258: Performed by: INTERNAL MEDICINE

## 2019-10-22 PROCEDURE — 83735 ASSAY OF MAGNESIUM: CPT

## 2019-10-22 PROCEDURE — 74018 RADEX ABDOMEN 1 VIEW: CPT

## 2019-10-22 PROCEDURE — 1200000000 HC SEMI PRIVATE

## 2019-10-22 RX ORDER — HYDRALAZINE HYDROCHLORIDE 50 MG/1
50 TABLET, FILM COATED ORAL EVERY 8 HOURS SCHEDULED
Status: DISCONTINUED | OUTPATIENT
Start: 2019-10-22 | End: 2019-10-23 | Stop reason: HOSPADM

## 2019-10-22 RX ORDER — HYDRALAZINE HYDROCHLORIDE 20 MG/ML
20 INJECTION INTRAMUSCULAR; INTRAVENOUS EVERY 6 HOURS PRN
Status: DISCONTINUED | OUTPATIENT
Start: 2019-10-22 | End: 2019-10-23 | Stop reason: HOSPADM

## 2019-10-22 RX ADMIN — PRAVASTATIN SODIUM 80 MG: 80 TABLET ORAL at 10:26

## 2019-10-22 RX ADMIN — INSULIN GLARGINE 24 UNITS: 100 INJECTION, SOLUTION SUBCUTANEOUS at 10:26

## 2019-10-22 RX ADMIN — CLOPIDOGREL BISULFATE 75 MG: 75 TABLET ORAL at 10:27

## 2019-10-22 RX ADMIN — CITALOPRAM HYDROBROMIDE 40 MG: 20 TABLET ORAL at 10:27

## 2019-10-22 RX ADMIN — OXYCODONE HYDROCHLORIDE AND ACETAMINOPHEN 1 TABLET: 5; 325 TABLET ORAL at 01:50

## 2019-10-22 RX ADMIN — OXYCODONE HYDROCHLORIDE AND ACETAMINOPHEN 1 TABLET: 5; 325 TABLET ORAL at 17:10

## 2019-10-22 RX ADMIN — HYDRALAZINE HYDROCHLORIDE 50 MG: 50 TABLET, FILM COATED ORAL at 12:49

## 2019-10-22 RX ADMIN — HYDROMORPHONE HYDROCHLORIDE 0.5 MG: 2 INJECTION, SOLUTION INTRAMUSCULAR; INTRAVENOUS; SUBCUTANEOUS at 15:03

## 2019-10-22 RX ADMIN — HYDROMORPHONE HYDROCHLORIDE 0.5 MG: 2 INJECTION, SOLUTION INTRAMUSCULAR; INTRAVENOUS; SUBCUTANEOUS at 08:58

## 2019-10-22 RX ADMIN — CIPROFLOXACIN 400 MG: 2 INJECTION, SOLUTION INTRAVENOUS at 06:35

## 2019-10-22 RX ADMIN — INSULIN LISPRO 7 UNITS: 100 INJECTION, SOLUTION INTRAVENOUS; SUBCUTANEOUS at 12:53

## 2019-10-22 RX ADMIN — INSULIN LISPRO 6 UNITS: 100 INJECTION, SOLUTION INTRAVENOUS; SUBCUTANEOUS at 12:53

## 2019-10-22 RX ADMIN — IOHEXOL 50 ML: 240 INJECTION, SOLUTION INTRATHECAL; INTRAVASCULAR; INTRAVENOUS; ORAL at 16:15

## 2019-10-22 RX ADMIN — INSULIN LISPRO 4 UNITS: 100 INJECTION, SOLUTION INTRAVENOUS; SUBCUTANEOUS at 09:04

## 2019-10-22 RX ADMIN — METRONIDAZOLE 500 MG: 500 INJECTION, SOLUTION INTRAVENOUS at 05:39

## 2019-10-22 RX ADMIN — IOPAMIDOL 75 ML: 755 INJECTION, SOLUTION INTRAVENOUS at 19:23

## 2019-10-22 RX ADMIN — ISOSORBIDE MONONITRATE 30 MG: 30 TABLET, EXTENDED RELEASE ORAL at 10:27

## 2019-10-22 RX ADMIN — HYDROMORPHONE HYDROCHLORIDE 0.5 MG: 2 INJECTION, SOLUTION INTRAMUSCULAR; INTRAVENOUS; SUBCUTANEOUS at 20:19

## 2019-10-22 RX ADMIN — METRONIDAZOLE 500 MG: 500 INJECTION, SOLUTION INTRAVENOUS at 15:03

## 2019-10-22 RX ADMIN — Medication 10 ML: at 10:35

## 2019-10-22 RX ADMIN — INSULIN LISPRO 7 UNITS: 100 INJECTION, SOLUTION INTRAVENOUS; SUBCUTANEOUS at 09:05

## 2019-10-22 RX ADMIN — OXYCODONE HYDROCHLORIDE AND ACETAMINOPHEN 1 TABLET: 5; 325 TABLET ORAL at 12:17

## 2019-10-22 RX ADMIN — ONDANSETRON 4 MG: 2 INJECTION INTRAMUSCULAR; INTRAVENOUS at 16:29

## 2019-10-22 RX ADMIN — DULOXETINE HYDROCHLORIDE 30 MG: 30 CAPSULE, DELAYED RELEASE ORAL at 10:26

## 2019-10-22 RX ADMIN — SENNOSIDES, DOCUSATE SODIUM 1 TABLET: 50; 8.6 TABLET, FILM COATED ORAL at 10:27

## 2019-10-22 RX ADMIN — ONDANSETRON 4 MG: 2 INJECTION INTRAMUSCULAR; INTRAVENOUS at 08:58

## 2019-10-22 RX ADMIN — PANTOPRAZOLE SODIUM 40 MG: 40 TABLET, DELAYED RELEASE ORAL at 05:39

## 2019-10-22 RX ADMIN — METRONIDAZOLE 500 MG: 500 INJECTION, SOLUTION INTRAVENOUS at 22:22

## 2019-10-22 RX ADMIN — ASPIRIN 81 MG 81 MG: 81 TABLET ORAL at 10:27

## 2019-10-22 RX ADMIN — Medication 10 ML: at 22:22

## 2019-10-22 ASSESSMENT — PAIN SCALES - GENERAL
PAINLEVEL_OUTOF10: 10
PAINLEVEL_OUTOF10: 0
PAINLEVEL_OUTOF10: 5
PAINLEVEL_OUTOF10: 9
PAINLEVEL_OUTOF10: 9
PAINLEVEL_OUTOF10: 8
PAINLEVEL_OUTOF10: 8
PAINLEVEL_OUTOF10: 10

## 2019-10-22 ASSESSMENT — PAIN DESCRIPTION - PAIN TYPE
TYPE: ACUTE PAIN
TYPE: CHRONIC PAIN

## 2019-10-22 ASSESSMENT — PAIN DESCRIPTION - LOCATION
LOCATION: CHEST
LOCATION: BACK;HIP

## 2019-10-22 ASSESSMENT — PAIN DESCRIPTION - ORIENTATION: ORIENTATION: LOWER

## 2019-10-23 VITALS
HEART RATE: 78 BPM | HEIGHT: 69 IN | BODY MASS INDEX: 36.41 KG/M2 | WEIGHT: 245.81 LBS | SYSTOLIC BLOOD PRESSURE: 141 MMHG | RESPIRATION RATE: 16 BRPM | TEMPERATURE: 97.7 F | OXYGEN SATURATION: 94 % | DIASTOLIC BLOOD PRESSURE: 91 MMHG

## 2019-10-23 LAB
ANION GAP SERPL CALCULATED.3IONS-SCNC: 18 MMOL/L (ref 3–16)
BASOPHILS ABSOLUTE: 0 K/UL (ref 0–0.2)
BASOPHILS RELATIVE PERCENT: 0.3 %
BUN BLDV-MCNC: 67 MG/DL (ref 7–20)
CALCIUM SERPL-MCNC: 8.2 MG/DL (ref 8.3–10.6)
CHLORIDE BLD-SCNC: 85 MMOL/L (ref 99–110)
CO2: 24 MMOL/L (ref 21–32)
CREAT SERPL-MCNC: 5 MG/DL (ref 0.9–1.3)
EKG ATRIAL RATE: 94 BPM
EKG DIAGNOSIS: NORMAL
EKG P AXIS: 55 DEGREES
EKG P-R INTERVAL: 194 MS
EKG Q-T INTERVAL: 402 MS
EKG QRS DURATION: 112 MS
EKG QTC CALCULATION (BAZETT): 502 MS
EKG R AXIS: -10 DEGREES
EKG T AXIS: 82 DEGREES
EKG VENTRICULAR RATE: 94 BPM
EOSINOPHILS ABSOLUTE: 0.4 K/UL (ref 0–0.6)
EOSINOPHILS RELATIVE PERCENT: 3.4 %
GFR AFRICAN AMERICAN: 15
GFR NON-AFRICAN AMERICAN: 12
GLUCOSE BLD-MCNC: 127 MG/DL (ref 70–99)
GLUCOSE BLD-MCNC: 163 MG/DL (ref 70–99)
GLUCOSE BLD-MCNC: 229 MG/DL (ref 70–99)
HCT VFR BLD CALC: 32.9 % (ref 40.5–52.5)
HEMOGLOBIN: 11.5 G/DL (ref 13.5–17.5)
LYMPHOCYTES ABSOLUTE: 1.1 K/UL (ref 1–5.1)
LYMPHOCYTES RELATIVE PERCENT: 8.4 %
MAGNESIUM: 1.7 MG/DL (ref 1.8–2.4)
MCH RBC QN AUTO: 32.7 PG (ref 26–34)
MCHC RBC AUTO-ENTMCNC: 35 G/DL (ref 31–36)
MCV RBC AUTO: 93.4 FL (ref 80–100)
MONOCYTES ABSOLUTE: 1 K/UL (ref 0–1.3)
MONOCYTES RELATIVE PERCENT: 7.7 %
NEUTROPHILS ABSOLUTE: 10.1 K/UL (ref 1.7–7.7)
NEUTROPHILS RELATIVE PERCENT: 80.2 %
PDW BLD-RTO: 14.3 % (ref 12.4–15.4)
PERFORMED ON: ABNORMAL
PERFORMED ON: ABNORMAL
PHOSPHORUS: 5.7 MG/DL (ref 2.5–4.9)
PLATELET # BLD: 246 K/UL (ref 135–450)
PMV BLD AUTO: 7.8 FL (ref 5–10.5)
POTASSIUM SERPL-SCNC: 3.5 MMOL/L (ref 3.5–5.1)
RBC # BLD: 3.53 M/UL (ref 4.2–5.9)
SODIUM BLD-SCNC: 127 MMOL/L (ref 136–145)
WBC # BLD: 12.6 K/UL (ref 4–11)

## 2019-10-23 PROCEDURE — 6370000000 HC RX 637 (ALT 250 FOR IP): Performed by: NURSE PRACTITIONER

## 2019-10-23 PROCEDURE — 96376 TX/PRO/DX INJ SAME DRUG ADON: CPT

## 2019-10-23 PROCEDURE — 90935 HEMODIALYSIS ONE EVALUATION: CPT

## 2019-10-23 PROCEDURE — 6360000002 HC RX W HCPCS: Performed by: NURSE PRACTITIONER

## 2019-10-23 PROCEDURE — 6360000002 HC RX W HCPCS: Performed by: INTERNAL MEDICINE

## 2019-10-23 PROCEDURE — 2580000003 HC RX 258: Performed by: INTERNAL MEDICINE

## 2019-10-23 PROCEDURE — 85025 COMPLETE CBC W/AUTO DIFF WBC: CPT

## 2019-10-23 PROCEDURE — 36415 COLL VENOUS BLD VENIPUNCTURE: CPT

## 2019-10-23 PROCEDURE — 80048 BASIC METABOLIC PNL TOTAL CA: CPT

## 2019-10-23 PROCEDURE — 83735 ASSAY OF MAGNESIUM: CPT

## 2019-10-23 PROCEDURE — 96366 THER/PROPH/DIAG IV INF ADDON: CPT

## 2019-10-23 PROCEDURE — 6370000000 HC RX 637 (ALT 250 FOR IP): Performed by: INTERNAL MEDICINE

## 2019-10-23 PROCEDURE — 93010 ELECTROCARDIOGRAM REPORT: CPT | Performed by: INTERNAL MEDICINE

## 2019-10-23 PROCEDURE — 2500000003 HC RX 250 WO HCPCS: Performed by: INTERNAL MEDICINE

## 2019-10-23 PROCEDURE — 84100 ASSAY OF PHOSPHORUS: CPT

## 2019-10-23 RX ORDER — HYDRALAZINE HYDROCHLORIDE 50 MG/1
50 TABLET, FILM COATED ORAL EVERY 8 HOURS SCHEDULED
Qty: 90 TABLET | Refills: 3 | Status: SHIPPED | OUTPATIENT
Start: 2019-10-23 | End: 2020-03-10 | Stop reason: SDUPTHER

## 2019-10-23 RX ADMIN — Medication 10 ML: at 10:58

## 2019-10-23 RX ADMIN — CLOPIDOGREL BISULFATE 75 MG: 75 TABLET ORAL at 10:52

## 2019-10-23 RX ADMIN — PRAVASTATIN SODIUM 80 MG: 80 TABLET ORAL at 10:52

## 2019-10-23 RX ADMIN — INSULIN GLARGINE 24 UNITS: 100 INJECTION, SOLUTION SUBCUTANEOUS at 10:53

## 2019-10-23 RX ADMIN — CITALOPRAM HYDROBROMIDE 40 MG: 20 TABLET ORAL at 10:52

## 2019-10-23 RX ADMIN — OXYCODONE HYDROCHLORIDE AND ACETAMINOPHEN 1 TABLET: 5; 325 TABLET ORAL at 10:58

## 2019-10-23 RX ADMIN — PANTOPRAZOLE SODIUM 40 MG: 40 TABLET, DELAYED RELEASE ORAL at 08:00

## 2019-10-23 RX ADMIN — CALCIUM ACETATE 667 MG: 667 CAPSULE ORAL at 13:12

## 2019-10-23 RX ADMIN — INSULIN LISPRO 7 UNITS: 100 INJECTION, SOLUTION INTRAVENOUS; SUBCUTANEOUS at 08:00

## 2019-10-23 RX ADMIN — HYDROMORPHONE HYDROCHLORIDE 0.5 MG: 2 INJECTION, SOLUTION INTRAMUSCULAR; INTRAVENOUS; SUBCUTANEOUS at 02:58

## 2019-10-23 RX ADMIN — SENNOSIDES, DOCUSATE SODIUM 1 TABLET: 50; 8.6 TABLET, FILM COATED ORAL at 10:52

## 2019-10-23 RX ADMIN — ISOSORBIDE MONONITRATE 30 MG: 30 TABLET, EXTENDED RELEASE ORAL at 10:52

## 2019-10-23 RX ADMIN — HYDRALAZINE HYDROCHLORIDE 50 MG: 50 TABLET, FILM COATED ORAL at 05:55

## 2019-10-23 RX ADMIN — INSULIN LISPRO 4 UNITS: 100 INJECTION, SOLUTION INTRAVENOUS; SUBCUTANEOUS at 08:00

## 2019-10-23 RX ADMIN — ANTACID TABLETS 500 MG: 500 TABLET, CHEWABLE ORAL at 03:04

## 2019-10-23 RX ADMIN — METRONIDAZOLE 500 MG: 500 INJECTION, SOLUTION INTRAVENOUS at 06:06

## 2019-10-23 RX ADMIN — HYDROMORPHONE HYDROCHLORIDE 0.5 MG: 2 INJECTION, SOLUTION INTRAMUSCULAR; INTRAVENOUS; SUBCUTANEOUS at 13:11

## 2019-10-23 RX ADMIN — ONDANSETRON 4 MG: 2 INJECTION INTRAMUSCULAR; INTRAVENOUS at 13:12

## 2019-10-23 RX ADMIN — CIPROFLOXACIN 400 MG: 2 INJECTION, SOLUTION INTRAVENOUS at 06:08

## 2019-10-23 RX ADMIN — ASPIRIN 81 MG 81 MG: 81 TABLET ORAL at 10:52

## 2019-10-23 RX ADMIN — DULOXETINE HYDROCHLORIDE 30 MG: 30 CAPSULE, DELAYED RELEASE ORAL at 10:52

## 2019-10-23 RX ADMIN — PROMETHAZINE HYDROCHLORIDE 12.5 MG: 25 TABLET ORAL at 14:17

## 2019-10-23 ASSESSMENT — PAIN DESCRIPTION - PROGRESSION
CLINICAL_PROGRESSION: NOT CHANGED

## 2019-10-23 ASSESSMENT — PAIN SCALES - GENERAL
PAINLEVEL_OUTOF10: 4
PAINLEVEL_OUTOF10: 5
PAINLEVEL_OUTOF10: 9
PAINLEVEL_OUTOF10: 10
PAINLEVEL_OUTOF10: 0
PAINLEVEL_OUTOF10: 0

## 2019-10-23 ASSESSMENT — PAIN DESCRIPTION - ORIENTATION: ORIENTATION: LOWER

## 2019-10-23 ASSESSMENT — PAIN DESCRIPTION - DESCRIPTORS: DESCRIPTORS: CONSTANT

## 2019-10-23 ASSESSMENT — PAIN DESCRIPTION - PAIN TYPE: TYPE: CHRONIC PAIN

## 2019-10-23 ASSESSMENT — PAIN DESCRIPTION - LOCATION: LOCATION: BACK

## 2019-10-23 ASSESSMENT — PAIN DESCRIPTION - ONSET: ONSET: ON-GOING

## 2019-10-23 ASSESSMENT — PAIN DESCRIPTION - FREQUENCY: FREQUENCY: CONTINUOUS

## 2019-10-24 ENCOUNTER — TELEPHONE (OUTPATIENT)
Dept: CARDIOLOGY | Age: 49
End: 2019-10-24

## 2019-10-24 DIAGNOSIS — R55 SYNCOPE, UNSPECIFIED SYNCOPE TYPE: Primary | ICD-10-CM

## 2019-10-25 ENCOUNTER — TELEPHONE (OUTPATIENT)
Dept: FAMILY MEDICINE CLINIC | Age: 49
End: 2019-10-25

## 2019-10-29 ENCOUNTER — TELEPHONE (OUTPATIENT)
Dept: CARDIOLOGY CLINIC | Age: 49
End: 2019-10-29

## 2019-10-31 ENCOUNTER — APPOINTMENT (OUTPATIENT)
Dept: CT IMAGING | Age: 49
End: 2019-10-31
Payer: COMMERCIAL

## 2019-10-31 ENCOUNTER — TELEPHONE (OUTPATIENT)
Dept: FAMILY MEDICINE CLINIC | Age: 49
End: 2019-10-31

## 2019-10-31 ENCOUNTER — APPOINTMENT (OUTPATIENT)
Dept: GENERAL RADIOLOGY | Age: 49
End: 2019-10-31
Payer: COMMERCIAL

## 2019-10-31 ENCOUNTER — HOSPITAL ENCOUNTER (EMERGENCY)
Age: 49
Discharge: HOME | End: 2019-10-31
Attending: EMERGENCY MEDICINE
Payer: COMMERCIAL

## 2019-10-31 VITALS
TEMPERATURE: 97.8 F | OXYGEN SATURATION: 98 % | HEART RATE: 91 BPM | SYSTOLIC BLOOD PRESSURE: 175 MMHG | BODY MASS INDEX: 37.03 KG/M2 | RESPIRATION RATE: 16 BRPM | DIASTOLIC BLOOD PRESSURE: 84 MMHG | WEIGHT: 250 LBS | HEIGHT: 69 IN

## 2019-10-31 DIAGNOSIS — R06.02 SHORTNESS OF BREATH: Primary | ICD-10-CM

## 2019-10-31 LAB
A/G RATIO: 1.2 (ref 1.1–2.2)
ALBUMIN SERPL-MCNC: 3.8 G/DL (ref 3.4–5)
ALP BLD-CCNC: 113 U/L (ref 40–129)
ALT SERPL-CCNC: 10 U/L (ref 10–40)
ANION GAP SERPL CALCULATED.3IONS-SCNC: 17 MMOL/L (ref 3–16)
AST SERPL-CCNC: 13 U/L (ref 15–37)
BASOPHILS ABSOLUTE: 0.1 K/UL (ref 0–0.2)
BASOPHILS RELATIVE PERCENT: 1.1 %
BILIRUB SERPL-MCNC: <0.2 MG/DL (ref 0–1)
BUN BLDV-MCNC: 52 MG/DL (ref 7–20)
CALCIUM SERPL-MCNC: 8.8 MG/DL (ref 8.3–10.6)
CHLORIDE BLD-SCNC: 92 MMOL/L (ref 99–110)
CO2: 25 MMOL/L (ref 21–32)
CREAT SERPL-MCNC: 3.7 MG/DL (ref 0.9–1.3)
EKG ATRIAL RATE: 93 BPM
EKG DIAGNOSIS: NORMAL
EKG P AXIS: 74 DEGREES
EKG P-R INTERVAL: 186 MS
EKG Q-T INTERVAL: 392 MS
EKG QRS DURATION: 96 MS
EKG QTC CALCULATION (BAZETT): 487 MS
EKG R AXIS: -35 DEGREES
EKG T AXIS: 81 DEGREES
EKG VENTRICULAR RATE: 93 BPM
EOSINOPHILS ABSOLUTE: 0.1 K/UL (ref 0–0.6)
EOSINOPHILS RELATIVE PERCENT: 1.6 %
GFR AFRICAN AMERICAN: 21
GFR NON-AFRICAN AMERICAN: 17
GLOBULIN: 3.2 G/DL
GLUCOSE BLD-MCNC: 402 MG/DL (ref 70–99)
HCT VFR BLD CALC: 31.3 % (ref 40.5–52.5)
HEMOGLOBIN: 10.8 G/DL (ref 13.5–17.5)
LYMPHOCYTES ABSOLUTE: 1 K/UL (ref 1–5.1)
LYMPHOCYTES RELATIVE PERCENT: 11.1 %
MCH RBC QN AUTO: 32.6 PG (ref 26–34)
MCHC RBC AUTO-ENTMCNC: 34.5 G/DL (ref 31–36)
MCV RBC AUTO: 94.7 FL (ref 80–100)
MONOCYTES ABSOLUTE: 0.7 K/UL (ref 0–1.3)
MONOCYTES RELATIVE PERCENT: 7.8 %
NEUTROPHILS ABSOLUTE: 6.7 K/UL (ref 1.7–7.7)
NEUTROPHILS RELATIVE PERCENT: 78.4 %
PDW BLD-RTO: 14.3 % (ref 12.4–15.4)
PLATELET # BLD: 236 K/UL (ref 135–450)
PMV BLD AUTO: 7.7 FL (ref 5–10.5)
POTASSIUM SERPL-SCNC: 3.3 MMOL/L (ref 3.5–5.1)
PRO-BNP: 4929 PG/ML (ref 0–124)
RBC # BLD: 3.3 M/UL (ref 4.2–5.9)
SODIUM BLD-SCNC: 134 MMOL/L (ref 136–145)
TOTAL PROTEIN: 7 G/DL (ref 6.4–8.2)
TROPONIN: 0.12 NG/ML
TROPONIN: 0.12 NG/ML
WBC # BLD: 8.6 K/UL (ref 4–11)

## 2019-10-31 PROCEDURE — 6360000002 HC RX W HCPCS: Performed by: NURSE PRACTITIONER

## 2019-10-31 PROCEDURE — 99285 EMERGENCY DEPT VISIT HI MDM: CPT

## 2019-10-31 PROCEDURE — 96375 TX/PRO/DX INJ NEW DRUG ADDON: CPT

## 2019-10-31 PROCEDURE — 94640 AIRWAY INHALATION TREATMENT: CPT

## 2019-10-31 PROCEDURE — 85025 COMPLETE CBC W/AUTO DIFF WBC: CPT

## 2019-10-31 PROCEDURE — 71260 CT THORAX DX C+: CPT

## 2019-10-31 PROCEDURE — 6370000000 HC RX 637 (ALT 250 FOR IP): Performed by: NURSE PRACTITIONER

## 2019-10-31 PROCEDURE — 93005 ELECTROCARDIOGRAM TRACING: CPT | Performed by: NURSE PRACTITIONER

## 2019-10-31 PROCEDURE — 96374 THER/PROPH/DIAG INJ IV PUSH: CPT

## 2019-10-31 PROCEDURE — 84484 ASSAY OF TROPONIN QUANT: CPT

## 2019-10-31 PROCEDURE — 80053 COMPREHEN METABOLIC PANEL: CPT

## 2019-10-31 PROCEDURE — 71046 X-RAY EXAM CHEST 2 VIEWS: CPT

## 2019-10-31 PROCEDURE — 93010 ELECTROCARDIOGRAM REPORT: CPT | Performed by: INTERNAL MEDICINE

## 2019-10-31 PROCEDURE — 83880 ASSAY OF NATRIURETIC PEPTIDE: CPT

## 2019-10-31 PROCEDURE — 6360000004 HC RX CONTRAST MEDICATION: Performed by: EMERGENCY MEDICINE

## 2019-10-31 RX ORDER — OXYCODONE HYDROCHLORIDE AND ACETAMINOPHEN 5; 325 MG/1; MG/1
1 TABLET ORAL ONCE
Status: COMPLETED | OUTPATIENT
Start: 2019-10-31 | End: 2019-10-31

## 2019-10-31 RX ORDER — IPRATROPIUM BROMIDE AND ALBUTEROL SULFATE 2.5; .5 MG/3ML; MG/3ML
1 SOLUTION RESPIRATORY (INHALATION) ONCE
Status: COMPLETED | OUTPATIENT
Start: 2019-10-31 | End: 2019-10-31

## 2019-10-31 RX ORDER — ONDANSETRON 2 MG/ML
4 INJECTION INTRAMUSCULAR; INTRAVENOUS ONCE
Status: COMPLETED | OUTPATIENT
Start: 2019-10-31 | End: 2019-10-31

## 2019-10-31 RX ADMIN — IPRATROPIUM BROMIDE AND ALBUTEROL SULFATE 1 AMPULE: .5; 3 SOLUTION RESPIRATORY (INHALATION) at 14:38

## 2019-10-31 RX ADMIN — IOPAMIDOL 75 ML: 755 INJECTION, SOLUTION INTRAVENOUS at 16:31

## 2019-10-31 RX ADMIN — OXYCODONE HYDROCHLORIDE AND ACETAMINOPHEN 1 TABLET: 5; 325 TABLET ORAL at 17:13

## 2019-10-31 RX ADMIN — HYDROMORPHONE HYDROCHLORIDE 1 MG: 1 INJECTION, SOLUTION INTRAMUSCULAR; INTRAVENOUS; SUBCUTANEOUS at 14:52

## 2019-10-31 RX ADMIN — ONDANSETRON 4 MG: 2 INJECTION INTRAMUSCULAR; INTRAVENOUS at 14:52

## 2019-10-31 ASSESSMENT — PAIN SCALES - GENERAL
PAINLEVEL_OUTOF10: 7
PAINLEVEL_OUTOF10: 8
PAINLEVEL_OUTOF10: 7
PAINLEVEL_OUTOF10: 10

## 2019-10-31 NOTE — TELEPHONE ENCOUNTER
Call from [facility] stating patient will be receiving Case management. Forms will be faxed to office requesting information.

## 2019-11-04 ENCOUNTER — TELEPHONE (OUTPATIENT)
Dept: CARDIOLOGY CLINIC | Age: 49
End: 2019-11-04

## 2019-11-06 ENCOUNTER — HOSPITAL ENCOUNTER (EMERGENCY)
Age: 49
Discharge: HOME | End: 2019-11-06
Attending: EMERGENCY MEDICINE
Payer: COMMERCIAL

## 2019-11-06 ENCOUNTER — APPOINTMENT (OUTPATIENT)
Dept: GENERAL RADIOLOGY | Age: 49
End: 2019-11-06
Payer: COMMERCIAL

## 2019-11-06 ENCOUNTER — APPOINTMENT (OUTPATIENT)
Dept: CT IMAGING | Age: 49
End: 2019-11-06
Payer: COMMERCIAL

## 2019-11-06 VITALS
HEIGHT: 69 IN | WEIGHT: 250 LBS | RESPIRATION RATE: 18 BRPM | SYSTOLIC BLOOD PRESSURE: 153 MMHG | TEMPERATURE: 98 F | DIASTOLIC BLOOD PRESSURE: 92 MMHG | BODY MASS INDEX: 37.03 KG/M2 | OXYGEN SATURATION: 99 % | HEART RATE: 77 BPM

## 2019-11-06 DIAGNOSIS — R14.0 ABDOMINAL BLOATING: ICD-10-CM

## 2019-11-06 DIAGNOSIS — R73.9 HYPERGLYCEMIA: ICD-10-CM

## 2019-11-06 DIAGNOSIS — Z99.2 CHRONIC KIDNEY DISEASE REQUIRING CHRONIC DIALYSIS (HCC): ICD-10-CM

## 2019-11-06 DIAGNOSIS — R11.0 NAUSEA: ICD-10-CM

## 2019-11-06 DIAGNOSIS — R06.02 SHORTNESS OF BREATH: Primary | ICD-10-CM

## 2019-11-06 DIAGNOSIS — N18.6 CHRONIC KIDNEY DISEASE REQUIRING CHRONIC DIALYSIS (HCC): ICD-10-CM

## 2019-11-06 DIAGNOSIS — G89.4 CHRONIC PAIN SYNDROME: ICD-10-CM

## 2019-11-06 LAB
A/G RATIO: 1.2 (ref 1.1–2.2)
ALBUMIN SERPL-MCNC: 3.7 G/DL (ref 3.4–5)
ALP BLD-CCNC: 135 U/L (ref 40–129)
ALT SERPL-CCNC: 14 U/L (ref 10–40)
ANION GAP SERPL CALCULATED.3IONS-SCNC: 14 MMOL/L (ref 3–16)
AST SERPL-CCNC: 18 U/L (ref 15–37)
BASE EXCESS VENOUS: 2.7 MMOL/L (ref -3–3)
BASOPHILS ABSOLUTE: 0.1 K/UL (ref 0–0.2)
BASOPHILS RELATIVE PERCENT: 0.9 %
BILIRUB SERPL-MCNC: <0.2 MG/DL (ref 0–1)
BUN BLDV-MCNC: 48 MG/DL (ref 7–20)
CALCIUM SERPL-MCNC: 8.9 MG/DL (ref 8.3–10.6)
CARBOXYHEMOGLOBIN: 2.3 % (ref 0–1.5)
CHLORIDE BLD-SCNC: 89 MMOL/L (ref 99–110)
CO2: 26 MMOL/L (ref 21–32)
CREAT SERPL-MCNC: 3.8 MG/DL (ref 0.9–1.3)
EOSINOPHILS ABSOLUTE: 0.2 K/UL (ref 0–0.6)
EOSINOPHILS RELATIVE PERCENT: 3.2 %
GFR AFRICAN AMERICAN: 20
GFR NON-AFRICAN AMERICAN: 17
GLOBULIN: 3.2 G/DL
GLUCOSE BLD-MCNC: 265 MG/DL (ref 70–99)
HCO3 VENOUS: 28.3 MMOL/L (ref 23–29)
HCT VFR BLD CALC: 32.2 % (ref 40.5–52.5)
HEMOGLOBIN: 10.8 G/DL (ref 13.5–17.5)
LYMPHOCYTES ABSOLUTE: 0.6 K/UL (ref 1–5.1)
LYMPHOCYTES RELATIVE PERCENT: 7.9 %
MCH RBC QN AUTO: 32 PG (ref 26–34)
MCHC RBC AUTO-ENTMCNC: 33.6 G/DL (ref 31–36)
MCV RBC AUTO: 95.3 FL (ref 80–100)
METHEMOGLOBIN VENOUS: 0.6 %
MONOCYTES ABSOLUTE: 0.4 K/UL (ref 0–1.3)
MONOCYTES RELATIVE PERCENT: 5.5 %
NEUTROPHILS ABSOLUTE: 6.3 K/UL (ref 1.7–7.7)
NEUTROPHILS RELATIVE PERCENT: 82.5 %
O2 CONTENT, VEN: 10 VOL %
O2 SAT, VEN: 65 %
O2 THERAPY: ABNORMAL
PCO2, VEN: 48.3 MMHG (ref 40–50)
PDW BLD-RTO: 14.4 % (ref 12.4–15.4)
PH VENOUS: 7.39 (ref 7.35–7.45)
PLATELET # BLD: 224 K/UL (ref 135–450)
PMV BLD AUTO: 8.4 FL (ref 5–10.5)
PO2, VEN: 32.5 MMHG (ref 25–40)
POTASSIUM SERPL-SCNC: 4.5 MMOL/L (ref 3.5–5.1)
PRO-BNP: 2654 PG/ML (ref 0–124)
RBC # BLD: 3.37 M/UL (ref 4.2–5.9)
SODIUM BLD-SCNC: 129 MMOL/L (ref 136–145)
SPECIMEN STATUS: NORMAL
TCO2 CALC VENOUS: 30 MMOL/L
TOTAL PROTEIN: 6.9 G/DL (ref 6.4–8.2)
TROPONIN: 0.07 NG/ML
WBC # BLD: 7.6 K/UL (ref 4–11)

## 2019-11-06 PROCEDURE — 80053 COMPREHEN METABOLIC PANEL: CPT

## 2019-11-06 PROCEDURE — 82803 BLOOD GASES ANY COMBINATION: CPT

## 2019-11-06 PROCEDURE — 83880 ASSAY OF NATRIURETIC PEPTIDE: CPT

## 2019-11-06 PROCEDURE — 93005 ELECTROCARDIOGRAM TRACING: CPT | Performed by: EMERGENCY MEDICINE

## 2019-11-06 PROCEDURE — 99285 EMERGENCY DEPT VISIT HI MDM: CPT

## 2019-11-06 PROCEDURE — 71046 X-RAY EXAM CHEST 2 VIEWS: CPT

## 2019-11-06 PROCEDURE — 85025 COMPLETE CBC W/AUTO DIFF WBC: CPT

## 2019-11-06 PROCEDURE — 84484 ASSAY OF TROPONIN QUANT: CPT

## 2019-11-06 PROCEDURE — 6370000000 HC RX 637 (ALT 250 FOR IP): Performed by: PHYSICIAN ASSISTANT

## 2019-11-06 PROCEDURE — 74176 CT ABD & PELVIS W/O CONTRAST: CPT

## 2019-11-06 RX ORDER — OXYCODONE AND ACETAMINOPHEN 7.5; 325 MG/1; MG/1
1 TABLET ORAL ONCE
Status: COMPLETED | OUTPATIENT
Start: 2019-11-06 | End: 2019-11-06

## 2019-11-06 RX ORDER — ONDANSETRON 4 MG/1
4 TABLET, ORALLY DISINTEGRATING ORAL EVERY 8 HOURS PRN
Qty: 15 TABLET | Refills: 0 | Status: SHIPPED | OUTPATIENT
Start: 2019-11-06 | End: 2020-08-05 | Stop reason: SDUPTHER

## 2019-11-06 RX ORDER — GABAPENTIN 100 MG/1
CAPSULE ORAL
Qty: 540 CAPSULE | Refills: 1 | Status: SHIPPED | OUTPATIENT
Start: 2019-11-06 | End: 2020-05-26

## 2019-11-06 RX ADMIN — OXYCODONE HYDROCHLORIDE AND ACETAMINOPHEN 1 TABLET: 7.5; 325 TABLET ORAL at 15:34

## 2019-11-06 ASSESSMENT — ENCOUNTER SYMPTOMS
SHORTNESS OF BREATH: 1
EYES NEGATIVE: 1
COUGH: 0
ABDOMINAL PAIN: 1
BACK PAIN: 1
NAUSEA: 1
VOMITING: 0
ABDOMINAL DISTENTION: 1
COLOR CHANGE: 0
DIARRHEA: 0

## 2019-11-06 ASSESSMENT — PAIN DESCRIPTION - PAIN TYPE: TYPE: ACUTE PAIN

## 2019-11-06 ASSESSMENT — PAIN SCALES - GENERAL
PAINLEVEL_OUTOF10: 10
PAINLEVEL_OUTOF10: 10
PAINLEVEL_OUTOF10: 7

## 2019-11-06 ASSESSMENT — PAIN DESCRIPTION - LOCATION: LOCATION: BACK;HIP

## 2019-11-08 DIAGNOSIS — I35.0 NONRHEUMATIC AORTIC VALVE STENOSIS: Primary | ICD-10-CM

## 2019-11-08 LAB
EKG ATRIAL RATE: 74 BPM
EKG DIAGNOSIS: NORMAL
EKG P AXIS: 69 DEGREES
EKG P-R INTERVAL: 230 MS
EKG Q-T INTERVAL: 414 MS
EKG QRS DURATION: 100 MS
EKG QTC CALCULATION (BAZETT): 459 MS
EKG R AXIS: 6 DEGREES
EKG T AXIS: 113 DEGREES
EKG VENTRICULAR RATE: 74 BPM

## 2019-11-08 PROCEDURE — 93010 ELECTROCARDIOGRAM REPORT: CPT | Performed by: INTERNAL MEDICINE

## 2019-11-19 ENCOUNTER — OFFICE VISIT (OUTPATIENT)
Dept: PULMONOLOGY | Age: 49
End: 2019-11-19
Payer: COMMERCIAL

## 2019-11-19 VITALS
HEART RATE: 89 BPM | SYSTOLIC BLOOD PRESSURE: 144 MMHG | OXYGEN SATURATION: 97 % | RESPIRATION RATE: 20 BRPM | HEIGHT: 69 IN | TEMPERATURE: 98.6 F | BODY MASS INDEX: 36.82 KG/M2 | WEIGHT: 248.6 LBS | DIASTOLIC BLOOD PRESSURE: 89 MMHG

## 2019-11-19 DIAGNOSIS — I50.32 CHRONIC DIASTOLIC CONGESTIVE HEART FAILURE (HCC): Chronic | ICD-10-CM

## 2019-11-19 DIAGNOSIS — Z95.3 S/P TAVR (TRANSCATHETER AORTIC VALVE REPLACEMENT), BIOPROSTHETIC: ICD-10-CM

## 2019-11-19 DIAGNOSIS — N18.6 END STAGE RENAL DISEASE ON DIALYSIS (HCC): ICD-10-CM

## 2019-11-19 DIAGNOSIS — Z23 NEED FOR INFLUENZA VACCINATION: Primary | ICD-10-CM

## 2019-11-19 DIAGNOSIS — R06.02 SOB (SHORTNESS OF BREATH): ICD-10-CM

## 2019-11-19 DIAGNOSIS — Z99.2 END STAGE RENAL DISEASE ON DIALYSIS (HCC): ICD-10-CM

## 2019-11-19 DIAGNOSIS — G47.33 OSA ON CPAP: Chronic | ICD-10-CM

## 2019-11-19 DIAGNOSIS — Z23 NEED FOR PNEUMOCOCCAL VACCINATION: ICD-10-CM

## 2019-11-19 DIAGNOSIS — Z87.891 FORMER SMOKER: ICD-10-CM

## 2019-11-19 DIAGNOSIS — Z99.89 OSA ON CPAP: Chronic | ICD-10-CM

## 2019-11-19 DIAGNOSIS — J44.9 CHRONIC OBSTRUCTIVE PULMONARY DISEASE, UNSPECIFIED COPD TYPE (HCC): Chronic | ICD-10-CM

## 2019-11-19 PROCEDURE — 1036F TOBACCO NON-USER: CPT | Performed by: INTERNAL MEDICINE

## 2019-11-19 PROCEDURE — G8926 SPIRO NO PERF OR DOC: HCPCS | Performed by: INTERNAL MEDICINE

## 2019-11-19 PROCEDURE — G8417 CALC BMI ABV UP PARAM F/U: HCPCS | Performed by: INTERNAL MEDICINE

## 2019-11-19 PROCEDURE — G0009 ADMIN PNEUMOCOCCAL VACCINE: HCPCS | Performed by: INTERNAL MEDICINE

## 2019-11-19 PROCEDURE — 3023F SPIROM DOC REV: CPT | Performed by: INTERNAL MEDICINE

## 2019-11-19 PROCEDURE — 3017F COLORECTAL CA SCREEN DOC REV: CPT | Performed by: INTERNAL MEDICINE

## 2019-11-19 PROCEDURE — G8482 FLU IMMUNIZE ORDER/ADMIN: HCPCS | Performed by: INTERNAL MEDICINE

## 2019-11-19 PROCEDURE — G8598 ASA/ANTIPLAT THER USED: HCPCS | Performed by: INTERNAL MEDICINE

## 2019-11-19 PROCEDURE — G8427 DOCREV CUR MEDS BY ELIG CLIN: HCPCS | Performed by: INTERNAL MEDICINE

## 2019-11-19 PROCEDURE — 90732 PPSV23 VACC 2 YRS+ SUBQ/IM: CPT | Performed by: INTERNAL MEDICINE

## 2019-11-19 PROCEDURE — 99214 OFFICE O/P EST MOD 30 MIN: CPT | Performed by: INTERNAL MEDICINE

## 2019-11-19 PROCEDURE — 1111F DSCHRG MED/CURRENT MED MERGE: CPT | Performed by: INTERNAL MEDICINE

## 2019-12-04 RX ORDER — HYDROXYZINE PAMOATE 50 MG/1
CAPSULE ORAL
Qty: 180 CAPSULE | Refills: 10 | Status: SHIPPED | OUTPATIENT
Start: 2019-12-04 | End: 2020-10-12 | Stop reason: SDUPTHER

## 2019-12-09 ENCOUNTER — TELEPHONE (OUTPATIENT)
Dept: FAMILY MEDICINE CLINIC | Age: 49
End: 2019-12-09

## 2019-12-10 ENCOUNTER — HOSPITAL ENCOUNTER (OUTPATIENT)
Dept: CARDIOLOGY | Age: 49
Discharge: HOME | End: 2019-12-10
Payer: COMMERCIAL

## 2019-12-10 ENCOUNTER — OFFICE VISIT (OUTPATIENT)
Dept: CARDIOLOGY CLINIC | Age: 49
End: 2019-12-10
Payer: COMMERCIAL

## 2019-12-10 VITALS
HEIGHT: 69 IN | HEART RATE: 87 BPM | OXYGEN SATURATION: 95 % | DIASTOLIC BLOOD PRESSURE: 82 MMHG | WEIGHT: 256 LBS | BODY MASS INDEX: 37.92 KG/M2 | SYSTOLIC BLOOD PRESSURE: 136 MMHG

## 2019-12-10 DIAGNOSIS — I47.1 SVT (SUPRAVENTRICULAR TACHYCARDIA) (HCC): ICD-10-CM

## 2019-12-10 DIAGNOSIS — I73.9 BILATERAL CLAUDICATION OF LOWER LIMB (HCC): ICD-10-CM

## 2019-12-10 DIAGNOSIS — I35.0 NONRHEUMATIC AORTIC VALVE STENOSIS: ICD-10-CM

## 2019-12-10 DIAGNOSIS — I10 HTN (HYPERTENSION), BENIGN: ICD-10-CM

## 2019-12-10 DIAGNOSIS — Z95.2 S/P TAVR (TRANSCATHETER AORTIC VALVE REPLACEMENT): Primary | ICD-10-CM

## 2019-12-10 DIAGNOSIS — I25.10 CORONARY ARTERY DISEASE INVOLVING NATIVE CORONARY ARTERY OF NATIVE HEART WITHOUT ANGINA PECTORIS: ICD-10-CM

## 2019-12-10 DIAGNOSIS — E78.00 HYPERCHOLESTEREMIA: ICD-10-CM

## 2019-12-10 DIAGNOSIS — I10 ESSENTIAL HYPERTENSION: ICD-10-CM

## 2019-12-10 LAB
LV EF: 55 %
LVEF MODALITY: NORMAL

## 2019-12-10 PROCEDURE — 1036F TOBACCO NON-USER: CPT | Performed by: INTERNAL MEDICINE

## 2019-12-10 PROCEDURE — G8482 FLU IMMUNIZE ORDER/ADMIN: HCPCS | Performed by: INTERNAL MEDICINE

## 2019-12-10 PROCEDURE — G8417 CALC BMI ABV UP PARAM F/U: HCPCS | Performed by: INTERNAL MEDICINE

## 2019-12-10 PROCEDURE — 93306 TTE W/DOPPLER COMPLETE: CPT

## 2019-12-10 PROCEDURE — 3017F COLORECTAL CA SCREEN DOC REV: CPT | Performed by: INTERNAL MEDICINE

## 2019-12-10 PROCEDURE — G8427 DOCREV CUR MEDS BY ELIG CLIN: HCPCS | Performed by: INTERNAL MEDICINE

## 2019-12-10 PROCEDURE — G8598 ASA/ANTIPLAT THER USED: HCPCS | Performed by: INTERNAL MEDICINE

## 2019-12-10 PROCEDURE — 99214 OFFICE O/P EST MOD 30 MIN: CPT | Performed by: INTERNAL MEDICINE

## 2019-12-10 RX ORDER — METOPROLOL SUCCINATE 25 MG/1
25 TABLET, EXTENDED RELEASE ORAL DAILY
Qty: 30 TABLET | Refills: 5 | Status: ON HOLD | OUTPATIENT
Start: 2019-12-10 | End: 2020-05-04

## 2019-12-11 ENCOUNTER — OFFICE VISIT (OUTPATIENT)
Dept: FAMILY MEDICINE CLINIC | Age: 49
End: 2019-12-11
Payer: COMMERCIAL

## 2019-12-11 VITALS
WEIGHT: 254 LBS | SYSTOLIC BLOOD PRESSURE: 138 MMHG | DIASTOLIC BLOOD PRESSURE: 80 MMHG | BODY MASS INDEX: 37.51 KG/M2 | HEART RATE: 89 BPM | OXYGEN SATURATION: 97 %

## 2019-12-11 DIAGNOSIS — E11.51 TYPE 2 DIABETES MELLITUS WITH DIABETIC PERIPHERAL ANGIOPATHY WITHOUT GANGRENE, WITH LONG-TERM CURRENT USE OF INSULIN (HCC): Primary | ICD-10-CM

## 2019-12-11 DIAGNOSIS — Z79.4 TYPE 2 DIABETES MELLITUS WITH DIABETIC PERIPHERAL ANGIOPATHY WITHOUT GANGRENE, WITH LONG-TERM CURRENT USE OF INSULIN (HCC): Primary | ICD-10-CM

## 2019-12-11 LAB — HBA1C MFR BLD: 11 %

## 2019-12-11 PROCEDURE — G8598 ASA/ANTIPLAT THER USED: HCPCS | Performed by: FAMILY MEDICINE

## 2019-12-11 PROCEDURE — 2022F DILAT RTA XM EVC RTNOPTHY: CPT | Performed by: FAMILY MEDICINE

## 2019-12-11 PROCEDURE — G8427 DOCREV CUR MEDS BY ELIG CLIN: HCPCS | Performed by: FAMILY MEDICINE

## 2019-12-11 PROCEDURE — 3017F COLORECTAL CA SCREEN DOC REV: CPT | Performed by: FAMILY MEDICINE

## 2019-12-11 PROCEDURE — G8417 CALC BMI ABV UP PARAM F/U: HCPCS | Performed by: FAMILY MEDICINE

## 2019-12-11 PROCEDURE — G8482 FLU IMMUNIZE ORDER/ADMIN: HCPCS | Performed by: FAMILY MEDICINE

## 2019-12-11 PROCEDURE — 3046F HEMOGLOBIN A1C LEVEL >9.0%: CPT | Performed by: FAMILY MEDICINE

## 2019-12-11 PROCEDURE — 99213 OFFICE O/P EST LOW 20 MIN: CPT | Performed by: FAMILY MEDICINE

## 2019-12-11 PROCEDURE — 1036F TOBACCO NON-USER: CPT | Performed by: FAMILY MEDICINE

## 2019-12-11 PROCEDURE — 83036 HEMOGLOBIN GLYCOSYLATED A1C: CPT | Performed by: FAMILY MEDICINE

## 2019-12-11 RX ORDER — METOPROLOL SUCCINATE 25 MG/1
25 TABLET, EXTENDED RELEASE ORAL DAILY
Qty: 90 TABLET | Refills: 3 | OUTPATIENT
Start: 2019-12-11

## 2019-12-13 PROCEDURE — 93228 REMOTE 30 DAY ECG REV/REPORT: CPT | Performed by: INTERNAL MEDICINE

## 2019-12-17 ENCOUNTER — TELEPHONE (OUTPATIENT)
Dept: NON INVASIVE DIAGNOSTICS | Age: 49
End: 2019-12-17

## 2019-12-17 DIAGNOSIS — R55 SYNCOPE, UNSPECIFIED SYNCOPE TYPE: ICD-10-CM

## 2020-01-10 RX ORDER — PRAVASTATIN SODIUM 80 MG/1
TABLET ORAL
Qty: 90 TABLET | Refills: 3 | Status: SHIPPED | OUTPATIENT
Start: 2020-01-10 | End: 2020-07-13 | Stop reason: SDUPTHER

## 2020-01-10 RX ORDER — CLOPIDOGREL BISULFATE 75 MG/1
TABLET ORAL
Qty: 90 TABLET | Refills: 3 | Status: SHIPPED | OUTPATIENT
Start: 2020-01-10 | End: 2020-07-13 | Stop reason: SDUPTHER

## 2020-01-21 RX ORDER — ALBUTEROL SULFATE 2.5 MG/3ML
SOLUTION RESPIRATORY (INHALATION)
Qty: 100 EACH | Refills: 1 | Status: SHIPPED | OUTPATIENT
Start: 2020-01-21 | End: 2020-03-10

## 2020-01-22 ENCOUNTER — TELEPHONE (OUTPATIENT)
Dept: FAMILY MEDICINE CLINIC | Age: 50
End: 2020-01-22

## 2020-01-27 RX ORDER — NITROGLYCERIN 0.4 MG/1
0.4 TABLET SUBLINGUAL EVERY 5 MIN PRN
Qty: 25 TABLET | Refills: 3 | Status: SHIPPED | OUTPATIENT
Start: 2020-01-27 | End: 2020-02-07

## 2020-02-03 ENCOUNTER — APPOINTMENT (OUTPATIENT)
Dept: GENERAL RADIOLOGY | Age: 50
End: 2020-02-03
Payer: COMMERCIAL

## 2020-02-03 ENCOUNTER — OFFICE VISIT (OUTPATIENT)
Dept: FAMILY MEDICINE CLINIC | Age: 50
End: 2020-02-03
Payer: COMMERCIAL

## 2020-02-03 ENCOUNTER — HOSPITAL ENCOUNTER (OUTPATIENT)
Age: 50
Setting detail: OBSERVATION
Discharge: HOME | End: 2020-02-04
Attending: EMERGENCY MEDICINE | Admitting: INTERNAL MEDICINE
Payer: COMMERCIAL

## 2020-02-03 ENCOUNTER — APPOINTMENT (OUTPATIENT)
Dept: CT IMAGING | Age: 50
End: 2020-02-03
Payer: COMMERCIAL

## 2020-02-03 VITALS
BODY MASS INDEX: 38.99 KG/M2 | WEIGHT: 264 LBS | SYSTOLIC BLOOD PRESSURE: 138 MMHG | HEART RATE: 80 BPM | OXYGEN SATURATION: 95 % | DIASTOLIC BLOOD PRESSURE: 80 MMHG

## 2020-02-03 LAB
A/G RATIO: 1.3 (ref 1.1–2.2)
ALBUMIN SERPL-MCNC: 3.6 G/DL (ref 3.4–5)
ALP BLD-CCNC: 117 U/L (ref 40–129)
ALT SERPL-CCNC: 14 U/L (ref 10–40)
ANION GAP SERPL CALCULATED.3IONS-SCNC: 16 MMOL/L (ref 3–16)
AST SERPL-CCNC: 13 U/L (ref 15–37)
BASOPHILS ABSOLUTE: 0.1 K/UL (ref 0–0.2)
BASOPHILS RELATIVE PERCENT: 0.7 %
BILIRUB SERPL-MCNC: <0.2 MG/DL (ref 0–1)
BUN BLDV-MCNC: 55 MG/DL (ref 7–20)
CALCIUM SERPL-MCNC: 8.3 MG/DL (ref 8.3–10.6)
CHLORIDE BLD-SCNC: 85 MMOL/L (ref 99–110)
CO2: 21 MMOL/L (ref 21–32)
CREAT SERPL-MCNC: 5.3 MG/DL (ref 0.9–1.3)
EOSINOPHILS ABSOLUTE: 0.3 K/UL (ref 0–0.6)
EOSINOPHILS RELATIVE PERCENT: 3.5 %
GFR AFRICAN AMERICAN: 14
GFR NON-AFRICAN AMERICAN: 11
GLOBULIN: 2.7 G/DL
GLUCOSE BLD-MCNC: 596 MG/DL (ref 70–99)
HCT VFR BLD CALC: 30.9 % (ref 40.5–52.5)
HEMOGLOBIN: 10.3 G/DL (ref 13.5–17.5)
INR BLD: 0.89 (ref 0.86–1.14)
LYMPHOCYTES ABSOLUTE: 0.9 K/UL (ref 1–5.1)
LYMPHOCYTES RELATIVE PERCENT: 10.9 %
MAGNESIUM: 1.8 MG/DL (ref 1.8–2.4)
MCH RBC QN AUTO: 31.7 PG (ref 26–34)
MCHC RBC AUTO-ENTMCNC: 33.4 G/DL (ref 31–36)
MCV RBC AUTO: 94.9 FL (ref 80–100)
MONOCYTES ABSOLUTE: 0.5 K/UL (ref 0–1.3)
MONOCYTES RELATIVE PERCENT: 5.9 %
NEUTROPHILS ABSOLUTE: 6.8 K/UL (ref 1.7–7.7)
NEUTROPHILS RELATIVE PERCENT: 79 %
PDW BLD-RTO: 14.4 % (ref 12.4–15.4)
PLATELET # BLD: 204 K/UL (ref 135–450)
PMV BLD AUTO: 8.6 FL (ref 5–10.5)
POTASSIUM SERPL-SCNC: 4.2 MMOL/L (ref 3.5–5.1)
PROTHROMBIN TIME: 10.3 SEC (ref 10–13.2)
RBC # BLD: 3.25 M/UL (ref 4.2–5.9)
SODIUM BLD-SCNC: 122 MMOL/L (ref 136–145)
TOTAL PROTEIN: 6.3 G/DL (ref 6.4–8.2)
TROPONIN: 0.04 NG/ML
WBC # BLD: 8.6 K/UL (ref 4–11)

## 2020-02-03 PROCEDURE — G8417 CALC BMI ABV UP PARAM F/U: HCPCS | Performed by: FAMILY MEDICINE

## 2020-02-03 PROCEDURE — 96374 THER/PROPH/DIAG INJ IV PUSH: CPT

## 2020-02-03 PROCEDURE — 96375 TX/PRO/DX INJ NEW DRUG ADDON: CPT

## 2020-02-03 PROCEDURE — 84484 ASSAY OF TROPONIN QUANT: CPT

## 2020-02-03 PROCEDURE — 94761 N-INVAS EAR/PLS OXIMETRY MLT: CPT

## 2020-02-03 PROCEDURE — 99214 OFFICE O/P EST MOD 30 MIN: CPT | Performed by: FAMILY MEDICINE

## 2020-02-03 PROCEDURE — 70450 CT HEAD/BRAIN W/O DYE: CPT

## 2020-02-03 PROCEDURE — 80053 COMPREHEN METABOLIC PANEL: CPT

## 2020-02-03 PROCEDURE — 1036F TOBACCO NON-USER: CPT | Performed by: FAMILY MEDICINE

## 2020-02-03 PROCEDURE — G8482 FLU IMMUNIZE ORDER/ADMIN: HCPCS | Performed by: FAMILY MEDICINE

## 2020-02-03 PROCEDURE — 6360000002 HC RX W HCPCS: Performed by: EMERGENCY MEDICINE

## 2020-02-03 PROCEDURE — 36415 COLL VENOUS BLD VENIPUNCTURE: CPT

## 2020-02-03 PROCEDURE — 85610 PROTHROMBIN TIME: CPT

## 2020-02-03 PROCEDURE — 99285 EMERGENCY DEPT VISIT HI MDM: CPT

## 2020-02-03 PROCEDURE — 71046 X-RAY EXAM CHEST 2 VIEWS: CPT

## 2020-02-03 PROCEDURE — 85025 COMPLETE CBC W/AUTO DIFF WBC: CPT

## 2020-02-03 PROCEDURE — 93000 ELECTROCARDIOGRAM COMPLETE: CPT | Performed by: FAMILY MEDICINE

## 2020-02-03 PROCEDURE — 3017F COLORECTAL CA SCREEN DOC REV: CPT | Performed by: FAMILY MEDICINE

## 2020-02-03 PROCEDURE — 93005 ELECTROCARDIOGRAM TRACING: CPT | Performed by: EMERGENCY MEDICINE

## 2020-02-03 PROCEDURE — 83735 ASSAY OF MAGNESIUM: CPT

## 2020-02-03 PROCEDURE — G8427 DOCREV CUR MEDS BY ELIG CLIN: HCPCS | Performed by: FAMILY MEDICINE

## 2020-02-03 RX ORDER — ONDANSETRON 2 MG/ML
4 INJECTION INTRAMUSCULAR; INTRAVENOUS ONCE
Status: COMPLETED | OUTPATIENT
Start: 2020-02-03 | End: 2020-02-03

## 2020-02-03 RX ADMIN — ONDANSETRON 4 MG: 2 INJECTION INTRAMUSCULAR; INTRAVENOUS at 23:43

## 2020-02-03 RX ADMIN — HYDROMORPHONE HYDROCHLORIDE 1 MG: 1 INJECTION, SOLUTION INTRAMUSCULAR; INTRAVENOUS; SUBCUTANEOUS at 23:43

## 2020-02-03 ASSESSMENT — PAIN SCALES - GENERAL
PAINLEVEL_OUTOF10: 8
PAINLEVEL_OUTOF10: 8

## 2020-02-03 ASSESSMENT — PAIN DESCRIPTION - PAIN TYPE: TYPE: ACUTE PAIN

## 2020-02-03 ASSESSMENT — ENCOUNTER SYMPTOMS
BACK PAIN: 0
NAUSEA: 0
ABDOMINAL PAIN: 0
COUGH: 0
SHORTNESS OF BREATH: 0

## 2020-02-03 ASSESSMENT — PAIN DESCRIPTION - DESCRIPTORS: DESCRIPTORS: SHARP

## 2020-02-03 ASSESSMENT — PAIN DESCRIPTION - LOCATION: LOCATION: CHEST

## 2020-02-03 NOTE — PROGRESS NOTES
Joni Ormond was evaluated today and a DME order was entered for a motorized wheelchair because he requires this to successfully complete daily living tasks of eating, bathing, toileting, personal cares and ambulating. A motorized wheelchair is necessary due to the patient's impaired ambulation and mobility restrictions. The patient would not be able to resolve these daily living tasks using a cane or walker or manual wheelchair. Patient does have hand dexterity, but lacks upper arm strength to self propel. He can maneuver within his home with adequate access. The patient cannot self-propel in a standard wheelchair. The need for this equipment was discussed with the patient and he understands, is in agreement, and has not expressed an unwillingness to use the wheelchair. 2/3/2020     Joni Ormond (:  1970) is a 44 y.o. male, here for evaluation of the following medical concerns:    Joni Ormond is a 44 y.o. male. Patient presents with: Other: wheel chair evaluation   Shoulder Pain: BL to finger tingling         The patients PMH, surgical history, family history, medications, allergies were all reviewed and updated as appropriate today. Chest Pain    This is a new problem. The current episode started today. The onset quality is sudden. The problem occurs constantly. The problem has been rapidly worsening. The pain is present in the substernal region and lateral region. The pain is moderate. The quality of the pain is described as numbness, pressure and tightness. The pain radiates to the right shoulder. Associated symptoms include claudication and malaise/fatigue. Pertinent negatives include no abdominal pain, back pain, cough, nausea, near-syncope or shortness of breath. The pain is aggravated by nothing. He has tried analgesics for the symptoms. The treatment provided no relief.  Risk factors include sedentary lifestyle, smoking/tobacco exposure, lack of exercise, male gender and obesity. His past medical history is significant for CAD. Review of Systems   Constitutional: Positive for malaise/fatigue. Respiratory: Negative for cough and shortness of breath. Cardiovascular: Positive for chest pain and claudication. Negative for near-syncope. Gastrointestinal: Negative for abdominal pain and nausea. Musculoskeletal: Negative for back pain. Prior to Visit Medications    Medication Sig Taking? Authorizing Provider   nitroGLYCERIN (NITROSTAT) 0.4 MG SL tablet Place 1 tablet under the tongue every 5 minutes as needed for Chest pain up to max of 3 total doses. If no relief after 1 dose, call 911.  Yes Baldo Arce MD   albuterol (PROVENTIL) (2.5 MG/3ML) 0.083% nebulizer solution INHALE 1 VIAL VIA NEBULIZER EVERY 6 HOURS AS NEEDED FOR WHEEZING Yes Baldo Arce MD   pravastatin (PRAVACHOL) 80 MG tablet TAKE 1 TABLET BY MOUTH ONCE DAILY Yes Crystal Ye MD   clopidogrel (PLAVIX) 75 MG tablet TAKE 1 TABLET BY MOUTH ONCE DAILY Yes Crystal Ye MD   metoprolol succinate (TOPROL XL) 25 MG extended release tablet Take 1 tablet by mouth daily Yes Chela Abreu MD   hydrOXYzine (VISTARIL) 50 MG capsule TAKE 1 TO 2 CAPSULES BY MOUTH NIGHTLY Yes Baldo Arce MD   Tiotropium Bromide-Olodaterol (STIOLTO RESPIMAT) 2.5-2.5 MCG/ACT AERS Inhale 2 puffs into the lungs daily Yes Yas Anton MD   fluticasone-umeclidin-vilant (TRELEGY ELLIPTA) 100-62.5-25 MCG/INH AEPB Inhale 1 puff into the lungs daily Yes Yas Anton MD   ondansetron (ZOFRAN ODT) 4 MG disintegrating tablet Take 1 tablet by mouth every 8 hours as needed for Nausea Yes Yonathan Poisson, PA   hydrALAZINE (APRESOLINE) 50 MG tablet Take 1 tablet by mouth every 8 hours Yes JAY Reynolds CNP   calcium acetate (PHOSLO) 667 MG capsule Take 1 capsule by mouth 3 times daily (with meals) Yes JAY Reynolds CNP   traZODone (DESYREL) 150 MG tablet TAKE (1) TABLET BY MOUTH NIGHTLY Yes Tameka Coley MD   citalopram (CELEXA) 40 MG tablet TAKE (1) TABLET BY MOUTH DAILY Yes Tameka Coley MD   isosorbide mononitrate (IMDUR) 30 MG extended release tablet TAKE 1 TABLET BY MOUTH EVERY DAY Yes Usama Valencia MD   umeclidinium-vilanterol (ANORO ELLIPTA) 62.5-25 MCG/INH AEPB inhaler Inhale 1 puff into the lungs daily Yes Luis Alford MD   ACCU-CHEK FASTCLIX LANCETS MISC TEST 3-4 TIMES DAILY, AS DIRECTED Yes Tameka Coley MD   blood glucose test strips (FREESTYLE LITE) strip Daily As needed. Yes Tameka Coley MD   glucose monitoring kit (FREESTYLE) monitoring kit 1 kit by Does not apply route daily Yes Tmaeka Coley MD   vitamin D (ERGOCALCIFEROL) 27312 units CAPS capsule TK 1 C PO WEEKLY Yes Historical Provider, MD   B Complex-C-Folic Acid (VIRT-CAPS) 1 MG CAPS TK ONE C PO  QD Yes Tameka Coley MD   Pediatric Multivitamins-Fl (MULTI VIT/FL) 0.25 MG CHEW Take 25 mg by mouth daily Yes Tameka Coley MD   pantoprazole (PROTONIX) 40 MG tablet Take 1 tablet by mouth every morning (before breakfast) Yes Tameka Coley MD   flunisolide (NASALIDE) 25 MCG/ACT (0.025%) SOLN Inhale 2 sprays into the lungs every 12 hours Yes Luis Alford MD   Tiotropium Bromide-Olodaterol (STIOLTO RESPIMAT) 2.5-2.5 MCG/ACT AERS Inhale 2 puffs into the lungs daily Yes Luis Alford MD   DULoxetine (CYMBALTA) 30 MG extended release capsule Take 1 capsule by mouth daily Yes JAY Ortiz - CNP   Insulin Syringe-Needle U-100 30G X 1/2\" 0.5 ML MISC 1 each by Does not apply route daily Yes Srinath Porter MD   oxyCODONE-acetaminophen (PERCOCET) 7.5-325 MG per tablet Take 1 tablet by mouth every 4 hours as needed for Pain. . Yes Historical Provider, MD   Polyethylene Glycol 3350 GRAN  Yes Historical Provider, MD   Glucose Blood (BLOOD GLUCOSE TEST STRIPS) STRP TEST 3-4 TIMES DAILY, AS DIRECTED Yes Ulices Rdz MD   Blood Glucose Monitoring Suppl ADAM USE AS DIRECTED.  Yes Ulices Rdz MD   Alcohol Swabs PADS USE AS DIRECTED Yes Annie Wadsworth MD   albuterol sulfate  (90 Base) MCG/ACT inhaler Inhale 2 puffs into the lungs every 6 hours as needed for Wheezing Yes Margarita Carson MD   ipratropium-albuterol (DUONEB) 0.5-2.5 (3) MG/3ML SOLN nebulizer solution Inhale 3 mLs into the lungs every 6 hours as needed for Shortness of Breath Yes Ghazala Tobin MD   Lancets MISC Test daily Yes Jade Caruso MD   calcium carbonate (TUMS) 500 MG chewable tablet Take 1 tablet by mouth 3 times daily as needed for Heartburn. Yes Historical Provider, MD   aspirin 81 MG chewable tablet Take 1 tablet by mouth daily. Patient taking differently: Take 81 mg by mouth daily Indications: stopped on  for surgery  Yes Charlotte Shirley MD   gabapentin (NEURONTIN) 100 MG capsule TAKE 1 TO 2 CAPSULES BY MOUTH THREE TIMES A DAY  Karolyn Solorio MD   insulin glargine (LANTUS) 100 UNIT/ML injection vial Inject 10-15 Units into the skin nightly  Karolyn Solorio MD        Social History     Tobacco Use    Smoking status: Former Smoker     Packs/day: 0.10     Years: 33.00     Pack years: 3.30     Types: Cigarettes     Last attempt to quit: 2019     Years since quittin.6    Smokeless tobacco: Never Used    Tobacco comment: TRYING TO QUIT 3-7 a day   Substance Use Topics    Alcohol use: No     Alcohol/week: 0.0 standard drinks        Vitals:    20 1743 20 1752   BP: (!) 142/72 138/80   Pulse: 80    SpO2: 95%    Weight: 264 lb (119.7 kg)      Estimated body mass index is 38.99 kg/m² as calculated from the following:    Height as of 12/10/19: 5' 9\" (1.753 m). Weight as of this encounter: 264 lb (119.7 kg). Physical Exam  Vitals signs and nursing note reviewed. Constitutional:       Appearance: He is well-developed. HENT:      Head: Normocephalic and atraumatic.       Right Ear: External ear normal.      Left Ear: External ear normal.      Nose: Nose normal.   Eyes:      Conjunctiva/sclera: Conjunctivae

## 2020-02-04 VITALS
DIASTOLIC BLOOD PRESSURE: 50 MMHG | TEMPERATURE: 97.6 F | HEART RATE: 74 BPM | WEIGHT: 261.02 LBS | SYSTOLIC BLOOD PRESSURE: 123 MMHG | RESPIRATION RATE: 16 BRPM | BODY MASS INDEX: 38.66 KG/M2 | OXYGEN SATURATION: 96 % | HEIGHT: 69 IN

## 2020-02-04 PROBLEM — R53.1 GENERALIZED WEAKNESS: Status: ACTIVE | Noted: 2020-02-04

## 2020-02-04 PROBLEM — N18.6 ESRD ON PERITONEAL DIALYSIS (HCC): Status: ACTIVE | Noted: 2018-11-09

## 2020-02-04 LAB
ANION GAP SERPL CALCULATED.3IONS-SCNC: 15 MMOL/L (ref 3–16)
BASOPHILS ABSOLUTE: 0.1 K/UL (ref 0–0.2)
BASOPHILS RELATIVE PERCENT: 0.6 %
BUN BLDV-MCNC: 62 MG/DL (ref 7–20)
CALCIUM SERPL-MCNC: 8.9 MG/DL (ref 8.3–10.6)
CHLORIDE BLD-SCNC: 91 MMOL/L (ref 99–110)
CO2: 23 MMOL/L (ref 21–32)
CREAT SERPL-MCNC: 5.5 MG/DL (ref 0.9–1.3)
EKG ATRIAL RATE: 67 BPM
EKG DIAGNOSIS: NORMAL
EKG P AXIS: 69 DEGREES
EKG P-R INTERVAL: 202 MS
EKG Q-T INTERVAL: 450 MS
EKG QRS DURATION: 92 MS
EKG QTC CALCULATION (BAZETT): 475 MS
EKG R AXIS: -9 DEGREES
EKG T AXIS: 89 DEGREES
EKG VENTRICULAR RATE: 67 BPM
EOSINOPHILS ABSOLUTE: 0.3 K/UL (ref 0–0.6)
EOSINOPHILS RELATIVE PERCENT: 3 %
ESTIMATED AVERAGE GLUCOSE: 337.9 MG/DL
GFR AFRICAN AMERICAN: 13
GFR NON-AFRICAN AMERICAN: 11
GLUCOSE BLD-MCNC: 321 MG/DL (ref 70–99)
GLUCOSE BLD-MCNC: 326 MG/DL (ref 70–99)
GLUCOSE BLD-MCNC: 402 MG/DL (ref 70–99)
GLUCOSE BLD-MCNC: 450 MG/DL (ref 70–99)
HBA1C MFR BLD: 13.4 %
HCT VFR BLD CALC: 33.1 % (ref 40.5–52.5)
HEMOGLOBIN: 11.3 G/DL (ref 13.5–17.5)
LYMPHOCYTES ABSOLUTE: 1 K/UL (ref 1–5.1)
LYMPHOCYTES RELATIVE PERCENT: 8.7 %
MCH RBC QN AUTO: 31.8 PG (ref 26–34)
MCHC RBC AUTO-ENTMCNC: 34.1 G/DL (ref 31–36)
MCV RBC AUTO: 93.1 FL (ref 80–100)
MONOCYTES ABSOLUTE: 0.8 K/UL (ref 0–1.3)
MONOCYTES RELATIVE PERCENT: 7.3 %
NEUTROPHILS ABSOLUTE: 8.8 K/UL (ref 1.7–7.7)
NEUTROPHILS RELATIVE PERCENT: 80.4 %
PDW BLD-RTO: 14.2 % (ref 12.4–15.4)
PERFORMED ON: ABNORMAL
PLATELET # BLD: 223 K/UL (ref 135–450)
PMV BLD AUTO: 8.4 FL (ref 5–10.5)
POTASSIUM REFLEX MAGNESIUM: 4.5 MMOL/L (ref 3.5–5.1)
RBC # BLD: 3.55 M/UL (ref 4.2–5.9)
SODIUM BLD-SCNC: 129 MMOL/L (ref 136–145)
TROPONIN: 0.03 NG/ML
TROPONIN: 0.04 NG/ML
WBC # BLD: 10.9 K/UL (ref 4–11)

## 2020-02-04 PROCEDURE — G0378 HOSPITAL OBSERVATION PER HR: HCPCS

## 2020-02-04 PROCEDURE — 80048 BASIC METABOLIC PNL TOTAL CA: CPT

## 2020-02-04 PROCEDURE — 6360000002 HC RX W HCPCS: Performed by: NURSE PRACTITIONER

## 2020-02-04 PROCEDURE — 94640 AIRWAY INHALATION TREATMENT: CPT

## 2020-02-04 PROCEDURE — 84484 ASSAY OF TROPONIN QUANT: CPT

## 2020-02-04 PROCEDURE — 6370000000 HC RX 637 (ALT 250 FOR IP): Performed by: EMERGENCY MEDICINE

## 2020-02-04 PROCEDURE — 96376 TX/PRO/DX INJ SAME DRUG ADON: CPT

## 2020-02-04 PROCEDURE — 83036 HEMOGLOBIN GLYCOSYLATED A1C: CPT

## 2020-02-04 PROCEDURE — 97162 PT EVAL MOD COMPLEX 30 MIN: CPT

## 2020-02-04 PROCEDURE — 6370000000 HC RX 637 (ALT 250 FOR IP): Performed by: INTERNAL MEDICINE

## 2020-02-04 PROCEDURE — 90935 HEMODIALYSIS ONE EVALUATION: CPT

## 2020-02-04 PROCEDURE — 6360000002 HC RX W HCPCS: Performed by: INTERNAL MEDICINE

## 2020-02-04 PROCEDURE — 6370000000 HC RX 637 (ALT 250 FOR IP): Performed by: NURSE PRACTITIONER

## 2020-02-04 PROCEDURE — 97110 THERAPEUTIC EXERCISES: CPT

## 2020-02-04 PROCEDURE — 97116 GAIT TRAINING THERAPY: CPT

## 2020-02-04 PROCEDURE — 85025 COMPLETE CBC W/AUTO DIFF WBC: CPT

## 2020-02-04 PROCEDURE — 36415 COLL VENOUS BLD VENIPUNCTURE: CPT

## 2020-02-04 PROCEDURE — 2580000003 HC RX 258: Performed by: NURSE PRACTITIONER

## 2020-02-04 PROCEDURE — 99214 OFFICE O/P EST MOD 30 MIN: CPT | Performed by: INTERNAL MEDICINE

## 2020-02-04 PROCEDURE — 96375 TX/PRO/DX INJ NEW DRUG ADDON: CPT

## 2020-02-04 RX ORDER — PANTOPRAZOLE SODIUM 40 MG/1
40 TABLET, DELAYED RELEASE ORAL
Status: DISCONTINUED | OUTPATIENT
Start: 2020-02-04 | End: 2020-02-04

## 2020-02-04 RX ORDER — GABAPENTIN 100 MG/1
100 CAPSULE ORAL 3 TIMES DAILY
Status: DISCONTINUED | OUTPATIENT
Start: 2020-02-04 | End: 2020-02-04

## 2020-02-04 RX ORDER — OXYCODONE AND ACETAMINOPHEN 7.5; 325 MG/1; MG/1
1 TABLET ORAL EVERY 4 HOURS PRN
Status: DISCONTINUED | OUTPATIENT
Start: 2020-02-04 | End: 2020-02-04 | Stop reason: HOSPADM

## 2020-02-04 RX ORDER — DULOXETIN HYDROCHLORIDE 30 MG/1
30 CAPSULE, DELAYED RELEASE ORAL DAILY
Status: DISCONTINUED | OUTPATIENT
Start: 2020-02-04 | End: 2020-02-04

## 2020-02-04 RX ORDER — SODIUM CHLORIDE 0.9 % (FLUSH) 0.9 %
10 SYRINGE (ML) INJECTION EVERY 12 HOURS SCHEDULED
Status: DISCONTINUED | OUTPATIENT
Start: 2020-02-04 | End: 2020-02-04 | Stop reason: HOSPADM

## 2020-02-04 RX ORDER — CLOPIDOGREL BISULFATE 75 MG/1
75 TABLET ORAL DAILY
Status: DISCONTINUED | OUTPATIENT
Start: 2020-02-04 | End: 2020-02-04

## 2020-02-04 RX ORDER — SENNA AND DOCUSATE SODIUM 50; 8.6 MG/1; MG/1
1 TABLET, FILM COATED ORAL DAILY
Status: DISCONTINUED | OUTPATIENT
Start: 2020-02-04 | End: 2020-02-04 | Stop reason: HOSPADM

## 2020-02-04 RX ORDER — HYDRALAZINE HYDROCHLORIDE 50 MG/1
50 TABLET, FILM COATED ORAL 2 TIMES DAILY
Status: DISCONTINUED | OUTPATIENT
Start: 2020-02-04 | End: 2020-02-04 | Stop reason: HOSPADM

## 2020-02-04 RX ORDER — CITALOPRAM 20 MG/1
40 TABLET ORAL NIGHTLY
Status: DISCONTINUED | OUTPATIENT
Start: 2020-02-04 | End: 2020-02-04 | Stop reason: HOSPADM

## 2020-02-04 RX ORDER — METOPROLOL SUCCINATE 25 MG/1
25 TABLET, EXTENDED RELEASE ORAL DAILY
Status: DISCONTINUED | OUTPATIENT
Start: 2020-02-04 | End: 2020-02-04

## 2020-02-04 RX ORDER — METOPROLOL SUCCINATE 25 MG/1
25 TABLET, EXTENDED RELEASE ORAL DAILY
Status: DISCONTINUED | OUTPATIENT
Start: 2020-02-05 | End: 2020-02-04 | Stop reason: HOSPADM

## 2020-02-04 RX ORDER — INSULIN GLARGINE 100 [IU]/ML
15 INJECTION, SOLUTION SUBCUTANEOUS ONCE
Status: COMPLETED | OUTPATIENT
Start: 2020-02-04 | End: 2020-02-04

## 2020-02-04 RX ORDER — IPRATROPIUM BROMIDE AND ALBUTEROL SULFATE 2.5; .5 MG/3ML; MG/3ML
1 SOLUTION RESPIRATORY (INHALATION) EVERY 6 HOURS PRN
Status: DISCONTINUED | OUTPATIENT
Start: 2020-02-04 | End: 2020-02-04 | Stop reason: HOSPADM

## 2020-02-04 RX ORDER — OXYCODONE AND ACETAMINOPHEN 7.5; 325 MG/1; MG/1
1 TABLET ORAL ONCE
Status: COMPLETED | OUTPATIENT
Start: 2020-02-04 | End: 2020-02-04

## 2020-02-04 RX ORDER — TRAZODONE HYDROCHLORIDE 50 MG/1
150 TABLET ORAL NIGHTLY
Status: DISCONTINUED | OUTPATIENT
Start: 2020-02-04 | End: 2020-02-04 | Stop reason: HOSPADM

## 2020-02-04 RX ORDER — ASPIRIN 81 MG/1
81 TABLET, CHEWABLE ORAL DAILY
Status: DISCONTINUED | OUTPATIENT
Start: 2020-02-04 | End: 2020-02-04 | Stop reason: HOSPADM

## 2020-02-04 RX ORDER — ALBUTEROL SULFATE 90 UG/1
2 AEROSOL, METERED RESPIRATORY (INHALATION) EVERY 6 HOURS PRN
Status: DISCONTINUED | OUTPATIENT
Start: 2020-02-04 | End: 2020-02-04 | Stop reason: HOSPADM

## 2020-02-04 RX ORDER — SODIUM CHLORIDE 0.9 % (FLUSH) 0.9 %
10 SYRINGE (ML) INJECTION PRN
Status: DISCONTINUED | OUTPATIENT
Start: 2020-02-04 | End: 2020-02-04 | Stop reason: HOSPADM

## 2020-02-04 RX ORDER — DULOXETIN HYDROCHLORIDE 30 MG/1
30 CAPSULE, DELAYED RELEASE ORAL NIGHTLY
Status: DISCONTINUED | OUTPATIENT
Start: 2020-02-04 | End: 2020-02-04

## 2020-02-04 RX ORDER — PRAVASTATIN SODIUM 80 MG/1
80 TABLET ORAL NIGHTLY
Status: DISCONTINUED | OUTPATIENT
Start: 2020-02-04 | End: 2020-02-04 | Stop reason: HOSPADM

## 2020-02-04 RX ORDER — GABAPENTIN 300 MG/1
300 CAPSULE ORAL 3 TIMES DAILY
Status: DISCONTINUED | OUTPATIENT
Start: 2020-02-04 | End: 2020-02-04 | Stop reason: HOSPADM

## 2020-02-04 RX ORDER — MIDODRINE HYDROCHLORIDE 5 MG/1
10 TABLET ORAL PRN
Status: DISCONTINUED | OUTPATIENT
Start: 2020-02-04 | End: 2020-02-04 | Stop reason: HOSPADM

## 2020-02-04 RX ORDER — PRAVASTATIN SODIUM 80 MG/1
80 TABLET ORAL DAILY
Status: DISCONTINUED | OUTPATIENT
Start: 2020-02-04 | End: 2020-02-04

## 2020-02-04 RX ORDER — PANTOPRAZOLE SODIUM 40 MG/1
40 TABLET, DELAYED RELEASE ORAL NIGHTLY
Status: DISCONTINUED | OUTPATIENT
Start: 2020-02-04 | End: 2020-02-04 | Stop reason: HOSPADM

## 2020-02-04 RX ORDER — HYDROXYZINE PAMOATE 25 MG/1
50 CAPSULE ORAL NIGHTLY
Status: DISCONTINUED | OUTPATIENT
Start: 2020-02-04 | End: 2020-02-04

## 2020-02-04 RX ORDER — INSULIN GLARGINE 100 [IU]/ML
25 INJECTION, SOLUTION SUBCUTANEOUS NIGHTLY
Qty: 6 VIAL | Refills: 1
Start: 2020-02-04 | End: 2020-03-04 | Stop reason: SDUPTHER

## 2020-02-04 RX ORDER — ALBUTEROL SULFATE 2.5 MG/3ML
2.5 SOLUTION RESPIRATORY (INHALATION) EVERY 4 HOURS PRN
Status: DISCONTINUED | OUTPATIENT
Start: 2020-02-04 | End: 2020-02-04 | Stop reason: HOSPADM

## 2020-02-04 RX ORDER — HYDRALAZINE HYDROCHLORIDE 50 MG/1
50 TABLET, FILM COATED ORAL EVERY 8 HOURS SCHEDULED
Status: DISCONTINUED | OUTPATIENT
Start: 2020-02-04 | End: 2020-02-04

## 2020-02-04 RX ORDER — ONDANSETRON 2 MG/ML
4 INJECTION INTRAMUSCULAR; INTRAVENOUS EVERY 6 HOURS PRN
Status: DISCONTINUED | OUTPATIENT
Start: 2020-02-04 | End: 2020-02-04 | Stop reason: HOSPADM

## 2020-02-04 RX ORDER — CLOPIDOGREL BISULFATE 75 MG/1
75 TABLET ORAL NIGHTLY
Status: DISCONTINUED | OUTPATIENT
Start: 2020-02-04 | End: 2020-02-04 | Stop reason: HOSPADM

## 2020-02-04 RX ORDER — SENNA AND DOCUSATE SODIUM 50; 8.6 MG/1; MG/1
1 TABLET, FILM COATED ORAL DAILY
Qty: 10 TABLET | Refills: 0 | Status: SHIPPED | OUTPATIENT
Start: 2020-02-04 | End: 2020-08-05 | Stop reason: ALTCHOICE

## 2020-02-04 RX ORDER — INSULIN GLARGINE 100 [IU]/ML
15 INJECTION, SOLUTION SUBCUTANEOUS NIGHTLY
Status: DISCONTINUED | OUTPATIENT
Start: 2020-02-04 | End: 2020-02-04

## 2020-02-04 RX ORDER — HEPARIN SODIUM 1000 [USP'U]/ML
4000 INJECTION, SOLUTION INTRAVENOUS; SUBCUTANEOUS PRN
Status: DISCONTINUED | OUTPATIENT
Start: 2020-02-04 | End: 2020-02-04 | Stop reason: HOSPADM

## 2020-02-04 RX ORDER — HYDROXYZINE PAMOATE 25 MG/1
50 CAPSULE ORAL NIGHTLY
Status: DISCONTINUED | OUTPATIENT
Start: 2020-02-04 | End: 2020-02-04 | Stop reason: HOSPADM

## 2020-02-04 RX ORDER — NICOTINE POLACRILEX 4 MG
15 LOZENGE BUCCAL PRN
Status: DISCONTINUED | OUTPATIENT
Start: 2020-02-04 | End: 2020-02-04 | Stop reason: HOSPADM

## 2020-02-04 RX ORDER — CITALOPRAM 20 MG/1
40 TABLET ORAL DAILY
Status: DISCONTINUED | OUTPATIENT
Start: 2020-02-04 | End: 2020-02-04

## 2020-02-04 RX ORDER — INSULIN GLARGINE 100 [IU]/ML
15 INJECTION, SOLUTION SUBCUTANEOUS NIGHTLY
Status: DISCONTINUED | OUTPATIENT
Start: 2020-02-05 | End: 2020-02-04 | Stop reason: HOSPADM

## 2020-02-04 RX ORDER — DEXTROSE MONOHYDRATE 25 G/50ML
12.5 INJECTION, SOLUTION INTRAVENOUS PRN
Status: DISCONTINUED | OUTPATIENT
Start: 2020-02-04 | End: 2020-02-04 | Stop reason: HOSPADM

## 2020-02-04 RX ORDER — DEXTROSE MONOHYDRATE 50 MG/ML
100 INJECTION, SOLUTION INTRAVENOUS PRN
Status: DISCONTINUED | OUTPATIENT
Start: 2020-02-04 | End: 2020-02-04 | Stop reason: HOSPADM

## 2020-02-04 RX ADMIN — OXYCODONE HYDROCHLORIDE AND ACETAMINOPHEN 1 TABLET: 7.5; 325 TABLET ORAL at 14:27

## 2020-02-04 RX ADMIN — OXYCODONE HYDROCHLORIDE AND ACETAMINOPHEN 1 TABLET: 7.5; 325 TABLET ORAL at 08:15

## 2020-02-04 RX ADMIN — OXYCODONE HYDROCHLORIDE AND ACETAMINOPHEN 1 TABLET: 7.5; 325 TABLET ORAL at 02:52

## 2020-02-04 RX ADMIN — GABAPENTIN 100 MG: 100 CAPSULE ORAL at 03:14

## 2020-02-04 RX ADMIN — HYDROXYZINE PAMOATE 50 MG: 25 CAPSULE ORAL at 03:14

## 2020-02-04 RX ADMIN — Medication 10 ML: at 08:20

## 2020-02-04 RX ADMIN — ONDANSETRON 4 MG: 2 INJECTION INTRAMUSCULAR; INTRAVENOUS at 10:32

## 2020-02-04 RX ADMIN — INSULIN HUMAN 8 UNITS: 100 INJECTION, SOLUTION PARENTERAL at 00:00

## 2020-02-04 RX ADMIN — GLYCOPYRROLATE AND FORMOTEROL FUMARATE 2 PUFF: 9; 4.8 AEROSOL, METERED RESPIRATORY (INHALATION) at 08:59

## 2020-02-04 RX ADMIN — HEPARIN SODIUM 4000 UNITS: 1000 INJECTION, SOLUTION INTRAVENOUS; SUBCUTANEOUS at 13:10

## 2020-02-04 RX ADMIN — MIDODRINE HYDROCHLORIDE 10 MG: 5 TABLET ORAL at 11:00

## 2020-02-04 RX ADMIN — OXYCODONE HYDROCHLORIDE AND ACETAMINOPHEN 1 TABLET: 7.5; 325 TABLET ORAL at 01:13

## 2020-02-04 RX ADMIN — CALCIUM ACETATE 667 MG: 667 CAPSULE ORAL at 08:15

## 2020-02-04 RX ADMIN — INSULIN GLARGINE 15 UNITS: 100 INJECTION, SOLUTION SUBCUTANEOUS at 10:48

## 2020-02-04 RX ADMIN — ASPIRIN 81 MG 81 MG: 81 TABLET ORAL at 08:15

## 2020-02-04 RX ADMIN — MIDODRINE HYDROCHLORIDE 10 MG: 5 TABLET ORAL at 13:08

## 2020-02-04 RX ADMIN — INSULIN LISPRO 12 UNITS: 100 INJECTION, SOLUTION INTRAVENOUS; SUBCUTANEOUS at 08:15

## 2020-02-04 RX ADMIN — GABAPENTIN 100 MG: 100 CAPSULE ORAL at 08:15

## 2020-02-04 RX ADMIN — TRAZODONE HYDROCHLORIDE 150 MG: 50 TABLET ORAL at 02:52

## 2020-02-04 ASSESSMENT — PAIN DESCRIPTION - PAIN TYPE
TYPE: ACUTE PAIN;CHRONIC PAIN
TYPE: CHRONIC PAIN

## 2020-02-04 ASSESSMENT — PAIN SCALES - GENERAL
PAINLEVEL_OUTOF10: 8
PAINLEVEL_OUTOF10: 6
PAINLEVEL_OUTOF10: 8
PAINLEVEL_OUTOF10: 8
PAINLEVEL_OUTOF10: 2
PAINLEVEL_OUTOF10: 6
PAINLEVEL_OUTOF10: 8
PAINLEVEL_OUTOF10: 9

## 2020-02-04 ASSESSMENT — PAIN DESCRIPTION - LOCATION
LOCATION: BACK;HIP;CHEST
LOCATION: BACK
LOCATION: CHEST
LOCATION: BACK;HIP

## 2020-02-04 ASSESSMENT — ENCOUNTER SYMPTOMS
SHORTNESS OF BREATH: 1
DIARRHEA: 0
ABDOMINAL PAIN: 0
NAUSEA: 0
VOMITING: 0
BACK PAIN: 1
ABDOMINAL DISTENTION: 1
COLOR CHANGE: 0
CHEST TIGHTNESS: 1

## 2020-02-04 NOTE — PROGRESS NOTES
Patient admitted to room from ED 0130. Bedside report received. Patient oriented to room, call light, bed rails, phone, lights and bathroom. Patient instructed about fire schedule of the day including:  Vital sign, frequency, lab draws, possible tests, frequency of MD and staff rounds. Patient instructed about precribed diet, how/when to call for meal service, and television. Telemetry box in place, patient aware of placement and reason. Bed locked, in lowest position, side rails up 2/4, call light within reach.

## 2020-02-04 NOTE — ED NOTES
Nurse to room, explained to patient about medications to be given with expected benefits, patient verbalizes understanding. Patient medicated per orders. Explained to patient about ambulation with POX, patient states \" unable to walk, part of my problem\". Patient states after he fell last week, states ambulation has \" gotten worse\" and is having more difficulty raising from a sitting/lying position and more difficulty moving/ambulating and is getting worse daily.   [facility] aware of patient's concern     Mary Brand RN  02/03/20 1143

## 2020-02-04 NOTE — CONSULTS
[facility]  Consult Note           Reason for Consult:  End stage renal disease  Requesting Physician:  Dr. Jennyfer Dacosta    Chief Complaint:  Chest pain  History Obtained From:  patient, electronic medical record    History of Present Ilness:    44year old male with ESRD on HD MWF, CAD, DM, s/p AVR admitted with chest pain. We have been asked to assist in further dialysis care. Last had HD on 2/3, cut short by one hour due to mid-sternal chest pain with radiation to neck and down arms. No sob, abdominal pain, or reduced intake.     Past Medical History:        Diagnosis Date    Ambulatory dysfunction     walker for long distances, SOB with distance    Aortic stenosis     echo 2017    Arthritis     hands and hips    Asthma     Bilateral hilar adenopathy syndrome 6/3/2013    CAD (coronary artery disease)     Dr. Lady Smith [facility]) 04/19/2019    EF= 43%    CH[facility]congest[facility] heart failure) ([facility])     Chronic pain     COPD (chronic obstructive pulmonary disease) ([address]     pulmonology Dr. Emilee Leung    Depression     Diabetes mellitus ([address]     borderline    Difficult intravenous access     Emphysema of lung ([address]     ESRD (end stage renal disease) on dialysis ([address]     MWF    Fear of needles     Gastric ulcer     GERD (gastroesophageal reflux disease)     Heart valve problem     bicuspic valve    Hemodialysis patient ([address]     History of spinal fracture     work incident    Hx of blood clots     Bilateral lower extremities; stents in place    Hyperlipidemia     Hypertension     MI (myocardial infarction) ([address] 2019    has had 9 MIs. 2019 was the last    Neuromuscular disorder ([address]     due to CVA    Numbness and tingling in left arm     from fistula    Pneumonia     PONV (postoperative nausea and vomiting)     Prolonged emergence from general anesthesia     States requires more medication than most people    Sleep apnea     Uses CPAP    Stroke ([address] 7mm thalamic cva 2017 deficts left side, left side weakness    TIA (transient ischemic attack)     Unspecified diseases of blood and blood-forming organs        Past Surgical History:        Procedure Laterality Date    AORTIC VALVE REPLACEMENT N/A 10/15/2019    TRANSCATHETER AORTIC VALVE REPLACEMENT FEMORAL APPROACH performed by Brittaney Gamez MD at [address] Right 7/2/2019    PERITONEAL DIALYSIS CATHETER REMOVAL performed by Cordelia Gonzales MD at [facility] COLONOSCOPY  2/29/2015    WN    CORONARY ANGIOPLASTY WITH STENT PLACEMENT  05/26/15    CYST REMOVAL  08/14/2013    EXCISION CYSTS, NECK X2 AND ABDOMINAL benign    DIAGNOSTIC CARDIAC CATH LAB PROCEDURE      DIALYSIS FISTULA CREATION Left 10/30/2017    LEFT BRACHIAL CEPHALIC FISTULA    DIALYSIS FISTULA CREATION Left 3/27/2019    LIGATION  AV FISTULA performed by Corinne Dalton, MD at [facility], COLON, DIAGNOSTIC      OTHER SURGICAL HISTORY  02/01/2017    laparoscopic cholecystectomy with intraoperative cholangiogram    OTHER SURGICAL HISTORY  2018    PORT PLACEMENT  - vas cath    OTHER SURGICAL HISTORY Bilateral 06/26/2018    laprascopic peritoneal dialysis catheter placement    OTHER SURGICAL HISTORY Right 09/2018    peritoneal dialysis port placed on right side of abdomen    OTHER SURGICAL HISTORY  05/28/2019    PTA/Stenting R External Iliac artery    IA LAP INSERTION TUNNELED INTRAPERITONEAL CATHETER N/A 9/21/2018    LAPAROSCOPIC PERITONEAL DIALYSIS CATHETER REPLACEMENT performed by Cordelia Gonzales MD at [address] [place] [address]  01/06/2016    UPPER GASTROINTESTINAL ENDOSCOPY  01/29/2017    possible candida, otherwise normal appearing    VASCULAR SURGERY  aprx 2 years ago    2 stents placed, each side of groin       Home Medications:    No current facility-administered medications on file prior to encounter.       Current Outpatient Medications on File Prior to Encounter   Medication Sig Dispense Refill    nitroGLYCERIN (NITROSTAT) 0.4 MG SL tablet Place 1 tablet under the tongue every 5 minutes as needed for Chest pain up to max of 3 total doses. If no relief after 1 dose, call 911. 25 tablet 3    albuterol (PROVENTIL) (2.5 MG/3ML) 0.083% nebulizer solution INHALE 1 VIAL VIA NEBULIZER EVERY 6 HOURS AS NEEDED FOR WHEEZING 100 each 1    pravastatin (PRAVACHOL) 80 MG tablet TAKE 1 TABLET BY MOUTH ONCE DAILY 90 tablet 3    clopidogrel (PLAVIX) 75 MG tablet TAKE 1 TABLET BY MOUTH ONCE DAILY 90 tablet 3    metoprolol succinate (TOPROL XL) 25 MG extended release tablet Take 1 tablet by mouth daily 30 tablet 5    hydrOXYzine (VISTARIL) 50 MG capsule TAKE 1 TO 2 CAPSULES BY MOUTH NIGHTLY 180 capsule 10    fluticasone-umeclidin-vilant (TRELEGY ELLIPTA) 100-62.5-25 MCG/INH AEPB Inhale 1 puff into the lungs daily 2 each 0    gabapentin (NEURONTIN) 100 MG capsule TAKE 1 TO 2 CAPSULES BY MOUTH THREE TIMES A  capsule 1    ondansetron (ZOFRAN ODT) 4 MG disintegrating tablet Take 1 tablet by mouth every 8 hours as needed for Nausea 15 tablet 0    hydrALAZINE (APRESOLINE) 50 MG tablet Take 1 tablet by mouth every 8 hours 90 tablet 3    calcium acetate (PHOSLO) 667 MG capsule Take 1 capsule by mouth 3 times daily (with meals) 60 capsule 3    traZODone (DESYREL) 150 MG tablet TAKE (1) TABLET BY MOUTH NIGHTLY 90 tablet 10    citalopram (CELEXA) 40 MG tablet TAKE (1) TABLET BY MOUTH DAILY 90 tablet 10    isosorbide mononitrate (IMDUR) 30 MG extended release tablet TAKE 1 TABLET BY MOUTH EVERY DAY 90 tablet 3    ACCU-CHEK FASTCLIX LANCETS MISC TEST 3-4 TIMES DAILY, AS DIRECTED 300 each 3    blood glucose test strips (FREESTYLE LITE) strip Daily As needed.  100 strip 3    glucose monitoring kit (FREESTYLE) monitoring kit 1 kit by Does not apply route daily 1 kit 0    vitamin D (ERGOCALCIFEROL) 61857 units CAPS capsule TK 1 C PO HD  - [facility]      Thank you for the consultation. Please do not hesitate to call with questions.     [employer]

## 2020-02-04 NOTE — ED PROVIDER NOTES
[facility] OR    TONSILLECTOMY      UPPER GASTROINTESTINAL ENDOSCOPY  01/06/2016    UPPER GASTROINTESTINAL ENDOSCOPY  01/29/2017    possible candida, otherwise normal appearing    VASCULAR SURGERY  aprx 2 years ago    2 stents placed, each side of groin         CURRENT MEDICATIONS       Previous Medications    ACCU-CHEK FASTCLIX LANCETS MISC    TEST 3-4 TIMES DAILY, AS DIRECTED    ALBUTEROL (PROVENTIL) (2.5 MG/3ML) 0.083% NEBULIZER SOLUTION    INHALE 1 VIAL VIA NEBULIZER EVERY 6 HOURS AS NEEDED FOR WHEEZING    ALBUTEROL SULFATE  (90 BASE) MCG/ACT INHALER    Inhale 2 puffs into the lungs every 6 hours as needed for Wheezing    ALCOHOL SWABS PADS    USE AS DIRECTED    ASPIRIN 81 MG CHEWABLE TABLET    Take 1 tablet by mouth daily. B COMPLEX-C-FOLIC ACID (VIRT-CAPS) 1 MG CAPS    TK ONE C PO  QD    BLOOD GLUCOSE MONITORING SUPPL ADAM    USE AS DIRECTED.    BLOOD GLUCOSE TEST STRIPS (FREESTYLE LITE) STRIP    Daily As needed. CALCIUM ACETATE (PHOSLO) 667 MG CAPSULE    Take 1 capsule by mouth 3 times daily (with meals)    CALCIUM CARBONATE (TUMS) 500 MG CHEWABLE TABLET    Take 1 tablet by mouth 3 times daily as needed for Heartburn.     CITALOPRAM (CELEXA) 40 MG TABLET    TAKE (1) TABLET BY MOUTH DAILY    CLOPIDOGREL (PLAVIX) 75 MG TABLET    TAKE 1 TABLET BY MOUTH ONCE DAILY    DULOXETINE (CYMBALTA) 30 MG EXTENDED RELEASE CAPSULE    Take 1 capsule by mouth daily    FLUNISOLIDE (NASALIDE) 25 MCG/ACT (0.025%) SOLN    Inhale 2 sprays into the lungs every 12 hours    FLUTICASONE-UMECLIDIN-VILANT (TRELEGY ELLIPTA) 100-62.5-25 MCG/INH AEPB    Inhale 1 puff into the lungs daily    GABAPENTIN (NEURONTIN) 100 MG CAPSULE    TAKE 1 TO 2 CAPSULES BY MOUTH THREE TIMES A DAY    GLUCOSE BLOOD (BLOOD GLUCOSE TEST STRIPS) STRP    TEST 3-4 TIMES DAILY, AS DIRECTED    GLUCOSE MONITORING KIT (FREESTYLE) MONITORING KIT    1 kit by Does not apply route daily    HYDRALAZINE (APRESOLINE) 50 MG TABLET    Take 1 tablet by mouth every 8 intact. No cranial nerve deficit, dysarthria or facial asymmetry. Motor: Weakness (right hand  strength slightly less compared to right, otherwise no focal deficits) present. No tremor, atrophy, abnormal muscle tone, seizure activity or pronator drift. Gait: Gait abnormal (significant difficulty ambulating per nursing staff when attempted). Comments: Bilateral hands with tingling, but no numbness in upper arms on right versus left   Psychiatric:         Mood and Affect: Mood is anxious. Speech: Speech normal.         Behavior: Behavior is cooperative.              DIAGNOSTIC RESULTS   :    Labs Reviewed   CBC WITH AUTO DIFFERENTIAL - Abnormal; Notable for the following components:       Result Value    RBC 3.25 (*)     Hemoglobin 10.3 (*)     Hematocrit 30.9 (*)     Lymphocytes Absolute 0.9 (*)     All other components within normal limits    Narrative:     Performed at:  [place] [address] [address]   Phone [phone]   COMPREHENSIVE METABOLIC PANEL - Abnormal; Notable for the following components:    Sodium 122 (*)     Chloride 85 (*)     Glucose 596 (*)     BUN 55 (*)     CREATININE 5.3 (*)     GFR Non- 11 (*)     GFR  14 (*)     Total Protein 6.3 (*)     AST 13 (*)     All other components within normal limits    Narrative:     Linda Murray tel. [phone],  Chemistry results called to and read back by Smita García RN, 02/03/2020  20:40, by [facility]  Performed at:  [place] [address] [address]   Phone [phone]   TROPONIN - Abnormal; Notable for the following components:    Troponin 0.04 (*)     All other components within normal limits    Narrative:     Performed at:  [place] [address] [address]   Phone [phone]   PROTIME-INR    Narrative:     Performed at:  [facility]) - [address]343)   insulin regular (HUMULIN R;NOVOLIN R) injection 8 Units (8 Units Intravenous Given 2/4/20 0000)     Patient was evaluated after reportedly waking up with right arm weakness this morning after sleeping along with developing increasing shortness of breath along with chest discomfort. He also is complaining of persistent right-sided neck pain since this morning. No midline tenderness on exam.  No obvious focal neuro deficits other than slightly decreased  strength in the right hand. Story not suggestive of carotid dissection. Troponin was mildly elevated although this is most likely from dialysis and is normally elevated. Story not suggestive of acute coronary syndrome and he had recent coronary catheterization which was reassuring. Story not suggestive of pulmonary embolism. Due to poorly controlled diabetes with possibility of acute stroke although not a candidate for TPA or thrombectomy, he will need further evaluation in the hospital with possible brain MRI in further evaluation. He was stable for the floor with telemetry. The patient tolerated their visit well. The patient and / or the family were informed of the results of any tests, a time was given to answer questions. FINAL IMPRESSION      1. Right arm weakness    2. Weight gain    3. Shortness of breath    4. Chronic bilateral low back pain without sciatica    5. Numbness and tingling in both hands    6. Neck pain on right side    7. ESRD (end stage renal disease) ([facility]    8. Uncontrolled type 2 diabetes mellitus with hyperglycemia ([facility]    9. Hyponatremia    10. Frequent falls          DISPOSITION/PLAN   DISPOSITION Admitted 02/04/2020 12:24:33 AM      PATIENT REFERRED TO:  Karolyn Solorio MD  [address] [facility].   [address] [facility] [address]  [phone]            DISCHARGEMEDICATIONS:  New Prescriptions    No medications on file       DISCONTINUED MEDICATIONS:  Discontinued Medications    No medications on file              (Please

## 2020-02-04 NOTE — PROGRESS NOTES
Physical Therapy    Facility/Department: [facility] B3 - MED SURG  Initial Assessment and treatment    NAME: Michael Berman  : 1970  MRN: [id number]    Date of Service: 2020    Discharge Recommendations:  Home with assist PRN   PT Equipment Recommendations  Equipment Needed: No    Assessment   Body structures, Functions, Activity limitations: Decreased functional mobility ; Decreased endurance;Decreased balance; Increased pain  Assessment: Pt referred for PT evaluation during current hospital stay with a diagnosis of generalized weakness, came into hospital with chest pain and numbness in BUE's. Pt currently not c/o UE numbness. Pt required SBA for transfers, SBA to [facility] for gait with RW, and is mod I for bed mobility. Pt is functioning very near baseline but would benefit from 1-2 additional sessions to progress ambulation distance and quality of gait. Recommend home with PRN sup/ assist at [place] from acute setting   Treatment Diagnosis: decreased indep with mobility  Prognosis: Good  Decision Making: Medium Complexity  PT Education: Goals;Transfer Training;Equipment;PT Role;Functional Mobility Training;Plan of Care;Gait Training  Patient Education: pt verbalized understanding  Barriers to Learning: no  REQUIRES PT FOLLOW UP: Yes  Activity Tolerance  Activity Tolerance: Patient Tolerated treatment well;Patient limited by pain       Patient Diagnosis(es): The primary encounter diagnosis was Right arm weakness. Diagnoses of Weight gain, Shortness of breath, Chronic bilateral low back pain without sciatica, Numbness and tingling in both hands, Neck pain on right side, ESRD (end stage renal disease) ([facility] Uncontrolled type 2 diabetes mellitus with hyperglycemia ([facility] Hyponatremia, and Frequent falls were also pertinent to this visit.      has a past medical history of Ambulatory dysfunction, Aortic stenosis, Arthritis, Asthma, Bilateral hilar adenopathy syndrome, CAD (coronary artery disease), Cardiomyopathy No  Referring Practitioner: Laura Tyler CNP  Referral Date : 02/03/20  Diagnosis: generalized weakness  Follows Commands: Within Functional Limits  General Comment  Comments: Pt resting in bed upon entry, sidelying due to back/leg pain  Subjective  Subjective: Pt agreeable to PT, reporting 9/10 pain  Pain Screening  Patient Currently in Pain: Yes  Pain Assessment  Pain Assessment: 0-10  Patient's Stated Pain Goal: 9  Pain Type: Chronic pain  Pain Location: Back  Non-Pharmaceutical Pain Intervention(s): Ambulation/Increased Activity; Therapeutic presence;Repositioned  Vital Signs  Patient Currently in Pain: Yes  Pre Treatment Pain Screening  Intervention List: Patient able to continue with treatment    Orientation  Orientation  Overall Orientation Status: Within Normal Limits  Social/Functional History  Social/Functional History  Lives With: Spouse  Type of Home: Mobile home  Home Layout: One level  Home Access: Ramped entrance  Bathroom Shower/Tub: Tub/Shower unit, Walk-in shower, Shower chair with back  Bathroom Toilet: Standard  Bathroom Equipment: Grab bars in [address]: 4 wheeled walkerArmani 49 bed  ADL Assistance: Independent  Homemaking Responsibilities: Yes  Meal Prep Responsibility: Primary  Laundry Responsibility: Secondary  Cleaning Responsibility: Secondary  Shopping Responsibility: Secondary(pt and wife share, but wife is disabled and they are having difficulties withgrocery shopping)  Ambulation Assistance: Independent(with rollator, household distances)  Transfer Assistance: Independent  Active : Yes  Occupation: On disability  Type of occupation:   Leisure & Hobbies: fishing and hunting, unable to do this past year         Objective          AROM RLE (degrees)  RLE AROM: WFL  AROM LLE (degrees)  LLE AROM : WFL  Strength RLE  Strength RLE: WFL  Comment: grossly 4 to 4+/5 throughout  Strength LLE  Strength LLE: WFL  Comment: grossly 4 to 4+/5 throughout

## 2020-02-04 NOTE — ED NOTES
[facility]  RE: esrd, right arm weakness, severe difficulty ambulating per Dr Bety Pisano spoke with G[facility]a at [facility]  02/04/20 0032

## 2020-02-04 NOTE — CONSULTS
[address] [facility]   Cardiovascular Evaluation    PATIENT: Jasiel Armstrong  DATE: 2020  MRN: [id number]  CSN: [id number]  : 1970    Primary Care Doctor/Referring provider: Kristi York MD    Reason for evaluation/Chief complaint:   Chest Pain (Pt reports chest pain started today, pt reports woke up with bilateral arm numbness/tingling, then about an hour after onset chest pain started, constant since onset. Pt reports pain as sharp in nature. )      Subjective:    History of present illness on initial date of evaluation:   Jasiel Armstrong is a 44 y.o. patient who presents with a several day history of feeling poorly. Patient states that he has had increasing lower extremity edema, weakness, dizziness and at times chills. He was able to see his primary care provider Dr. Gia Gallego yesterday. At that veronika,e the patient complained of right shoulder pain radiating into the chest and neck area. He was eventually seen in the emergency room and admitted to the hospital for concerns of possible acute coronary syndrome. We have been asked to see the patient for further evaluation and management. The patient has an extensive history of cardiovascular illness. He underwent a transaortic valvular procedure late last year in 2019 in the setting of severe symptomatic aortic valvular stenosis. The patient at that time did undergo a screening coronary angiogram which demonstrated nonobstructive coronary artery disease and preserved left ventricular function. The patient does have chronic kidney disease and is on hemodialysis. The patient states that the main symptoms that led him to see his primary care provider revolved around bilateral arm and hand tingling. Patient states that he mentioned right shoulder pain that did go into the chest area. The pain is worsened with motion of the right shoulder. He states that the range of motion in his right shoulder has been deteriorated.   He Taking? Authorizing Provider   nitroGLYCERIN (NITROSTAT) 0.4 MG SL tablet Place 1 tablet under the tongue every 5 minutes as needed for Chest pain up to max of 3 total doses.  If no relief after 1 dose, call 911. 1/27/20  Yes Josiah House MD   albuterol (PROVENTIL) (2.5 MG/3ML) 0.083% nebulizer solution INHALE 1 VIAL VIA NEBULIZER EVERY 6 HOURS AS NEEDED FOR WHEEZING 1/21/20  Yes Josiah House MD   pravastatin (PRAVACHOL) 80 MG tablet TAKE 1 TABLET BY MOUTH ONCE DAILY 1/10/20  Yes Leonetta Hatchet, MD   clopidogrel (PLAVIX) 75 MG tablet TAKE 1 TABLET BY MOUTH ONCE DAILY 1/10/20  Yes Leonetta Hatchet, MD   metoprolol succinate (TOPROL XL) 25 MG extended release tablet Take 1 tablet by mouth daily 12/10/19  Yes Juarez Doss MD   hydrOXYzine (VISTARIL) 50 MG capsule TAKE 1 TO 2 CAPSULES BY MOUTH NIGHTLY 12/4/19  Yes Josiah House MD   fluticasone-umeclidin-vilant (TRELEGY ELLIPTA) 100-62.5-25 MCG/INH AEPB Inhale 1 puff into the lungs daily 11/19/19  Yes Liliya Oliver MD   gabapentin (NEURONTIN) 100 MG capsule TAKE 1 TO 2 CAPSULES BY MOUTH THREE TIMES A DAY 11/6/19 2/4/20 Yes Josiah House MD   ondansetron (ZOFRAN ODT) 4 MG disintegrating tablet Take 1 tablet by mouth every 8 hours as needed for Nausea 11/6/19  Yes JASE Hansen   hydrALAZINE (APRESOLINE) 50 MG tablet Take 1 tablet by mouth every 8 hours 10/23/19  Yes JAY Murguia CNP   calcium acetate (PHOSLO) 667 MG capsule Take 1 capsule by mouth 3 times daily (with meals) 10/23/19  Yes JAY Murguia CNP   traZODone (DESYREL) 150 MG tablet TAKE (1) TABLET BY MOUTH NIGHTLY 10/9/19  Yes Josiah House MD   citalopram (CELEXA) 40 MG tablet TAKE (1) TABLET BY MOUTH DAILY 10/9/19  Yes Josiah House MD   isosorbide mononitrate (IMDUR) 30 MG extended release tablet TAKE 1 TABLET BY MOUTH EVERY DAY 9/18/19  Yes Leonetta Hatchet, MD   ACCU-CHEK FASTCLIX LANCETS MISC TEST 3-4 TIMES DAILY, AS DIRECTED 8/19/19  Yes Josiah House MD (120.3 kg)     Wt Readings from Last 3 Encounters:   02/04/20 265 lb 4.8 oz (120.3 kg)   02/03/20 264 lb (119.7 kg)   12/11/19 254 lb (115.2 kg)       Intake/Output Summary (Last 24 hours) at 2/4/2020 1017  Last data filed at 2/4/2020 0956  Gross per 24 hour   Intake 961 ml   Output --   Net 961 ml       General Appearance:  Alert, cooperative, mild distress, appears older than stated age   Head:  Normocephalic, without obvious abnormality, atraumatic   Eyes:  PERRL, conjunctiva red;corneas clear       Nose: Nares normal, no drainage or sinus tenderness   Throat: Lips, mucosa, and tongue normal   Neck: Supple, symmetrical, trachea midline, no adenopathy, thyroid: not enlarged, symmetric, no tenderness/mass/nodules, no carotid bruit. ? JVD       Lungs:   Reduced to auscultation bilaterally, respirations mildly labored   Chest Wall:  No tenderness or deformity. Left subclavian dialysis catheter in place. Heart:  Regular rhythm and normal rate; heart sounds. No obvious diastolic murmur noted. Abdomen:   Soft, obese, non-tender, bowel sounds active all four quadrants,  no masses, no organomegaly           Extremities: Extremities normal, atraumatic, no cyanosis. + edema   Pulses: 2+ and symmetric   Skin: Skin color, texture, turgor normal, no rashes or lesions   Pysch: Fatigue mood and unusual affect   Neurologic: Normal gross motor and sensory exam.         Labs  Recent Labs     02/03/20 2002 02/04/20  0802   WBC 8.6 10.9   HGB 10.3* 11.3*   HCT 30.9* 33.1*   MCV 94.9 93.1    223     Recent Labs     02/03/20 2002 02/04/20  0802   CREATININE 5.3* 5.5*   BUN 55* 62*   * 129*   K 4.2 4.5   CL 85* 91*   CO2 21 23     Recent Labs     02/03/20 2002   INR 0.89   PROTIME 10.3     Recent Labs     02/03/20 2002 02/04/20  0234 02/04/20  0801   TROPONINI 0.04* 0.04* 0.03*     Invalid input(s): PRO-BNP  No results for input(s): CHOL, HDL in the last 72 hours. Invalid input(s): LDL, TG      Imaging:   I

## 2020-02-04 NOTE — PROGRESS NOTES
RESPIRATORY THERAPY ASSESSMENT    Name:  Juan Ramon Brown Record Number:  [id number]  Age: 44 y.o. Gender: male  : 1970  Today's Date:  2020  Room:  [facility]0370-01    Assessment     Is the patient being admitted for a COPD or Asthma exacerbation? No   (If yes the patient will be seen every 4 hours for the first 24 hours and then reassessed)    Patient Admission Diagnosis      Allergies  Allergies   Allergen Reactions    Morphine Nausea And Vomiting       Minimum Predicted Vital Capacity:     NA          Actual Vital Capacity:      NA              Pulmonary History:COPD  Home Oxygen Therapy:  room air  Home Respiratory Therapy:Albuterol/Ipratropium Bromide HHN PRN, Albuterol PRN  Current Respiratory Therapy:  Duoneb PRN          Respiratory Severity Index(RSI)   Patients with orders for inhalation medications, oxygen, or any therapeutic treatment modality will be placed on Respiratory Protocol. They will be assessed with the first treatment and at least every 72 hours thereafter. The following severity scale will be used to determine frequency of treatment intervention. Smoking History: Mild Exacerbation = 3    Social History  Social History     Tobacco Use    Smoking status: Former Smoker     Packs/day: 0.10     Years: 33.00     Pack years: 3.30     Types: Cigarettes     Last attempt to quit: 2019     Years since quittin.6    Smokeless tobacco: Never Used    Tobacco comment: TRYING TO QUIT 3-7 a day   Substance Use Topics    Alcohol use:  Yes     Alcohol/week: 0.0 standard drinks     Comment: occ    Drug use: No       Recent Surgical History: None = 0  Past Surgical History  Past Surgical History:   Procedure Laterality Date    AORTIC VALVE REPLACEMENT N/A 10/15/2019    TRANSCATHETER AORTIC VALVE REPLACEMENT FEMORAL APPROACH performed by Ana Adkins MD at [address] Right 2019    PERITONEAL DIALYSIS CATHETER REMOVAL performed by Ana Adkins Evy Anthony MD at [facility] COLONOSCOPY  2/29/2015    WNL    CORONARY ANGIOPLASTY WITH STENT PLACEMENT  05/26/15    CYST REMOVAL  08/14/2013    EXCISION CYSTS, NECK X2 AND ABDOMINAL benign    DIAGNOSTIC CARDIAC CATH LAB PROCEDURE      DIALYSIS FISTULA CREATION Left 10/30/2017    LEFT BRACHIAL CEPHALIC FISTULA    DIALYSIS FISTULA CREATION Left 3/27/2019    LIGATION  AV FISTULA performed by Vazquez Murphy MD at [address] [place] [address] Ave, COLON, DIAGNOSTIC      OTHER SURGICAL HISTORY  02/01/2017    laparoscopic cholecystectomy with intraoperative cholangiogram    OTHER SURGICAL HISTORY  2018    PORT PLACEMENT  - vas cath    OTHER SURGICAL HISTORY Bilateral 06/26/2018    laprascopic peritoneal dialysis catheter placement    OTHER SURGICAL HISTORY Right 09/2018    peritoneal dialysis port placed on right side of abdomen    OTHER SURGICAL HISTORY  05/28/2019    PTA/Stenting R External Iliac artery    [place] LAP INSERTION TUNNELED INTRAPERITONEAL CATHETER N/A 9/21/2018    LAPAROSCOPIC PERITONEAL DIALYSIS CATHETER REPLACEMENT performed by Flaquito Smallwood MD at [address] [place] [address]  01/06/2016    UPPER GASTROINTESTINAL ENDOSCOPY  01/29/2017    possible candida, otherwise normal appearing    VASCULAR SURGERY  aprx 2 years ago    2 stents placed, each side of groin       Level of Consciousness: Alert, Oriented, and Cooperative = 0    Level of Activity: Mostly sedentary, minimal walking = 2    Respiratory Pattern: Regular Pattern; RR 8-20 = 0    Breath Sounds: Clear = 0    Sputum   ,  ,    Cough: Strong, spontaneous, non-productive = 0    Vital Signs   /73   Pulse 77   Temp 98.5 °F (36.9 °C) (Oral)   Resp 14   Ht 5' 9\" (1.753 m)   Wt 265 lb 4.8 oz (120.3 kg)   SpO2 97%   BMI 39.18 kg/m²   SPO2 (COPD values may differ): Greater than or equal to 92% on room air = 0    Peak Flow (asthma only): not applicable = 0    RSI: 0-4 = See once and

## 2020-02-04 NOTE — DISCHARGE SUMMARY
Hospital Medicine Discharge Summary    Patient ID: Jelena Murray      Patient's PCP: Tameka Coley MD    Admit Date: 2/3/2020     Discharge Date:   02/04/20     Admitting Physician: Stephany Fritz MD     Discharge Physician: Felicia San MD     Discharge Diagnoses: Active Hospital Problems    Diagnosis    Generalized weakness [R53.1]    Atrial fibrillation (HCC) [I48.91]    ESRD on peritoneal dialysis ([facility] [N18.6, Z99.2]    HTN (hypertension), benign [I10]    Obesity (BMI 30-39. 9) [E66.9]       The patient was seen and examined on day of discharge and this discharge summary is in conjunction with any daily progress note from day of discharge. Hospital Course:  44 Y M with a h/o HTN, HLD, poorly controlled DM2, and ESRD on HD presents with worsening painful bilateral hand paresthesias and atypical chest pain. - The paresthesias seemed to be due to diabetic neuropathy. Adjusted his insulin regimen. He admits to missing many doses of insulin. A1c 13. He did well with glargine 25 qhs + SSI on a previous prolonged admission. He says he gets low blood sugars about twice per week at home when he takes 20 u qhs. I think this is unlikely. Discharged with glargine 25 qhs and encouraged better compliance. He is not ready for SSI. F/u soon with PCP. He clearly had no arm weakness. - cardiology determined he does not need a LHC or stress test.  He had a TTE 2 months ago which looked good. Defer to cardiology as to whether he needs to wait here for another TTE. I do not suspect PE or acute aortic pathology   - nephrology performed HD on 2/4 prior to discharge.  - anxiety and depression are greatly affecting the way he perceives his symptoms. Also affecting his ability to provide medical care for himself. No suicidal or homicidal ideation. Had to stop duloxetine based on renal function, continue other meds, f/u with psych as outpatient.            Physical Exam Performed:     BP CONSULT TO NEPHROLOGY  IP CONSULT TO CARDIOLOGY    Disposition:  home     Condition at Discharge: Stable    Discharge Instructions/Follow-up:  Follow up with PCP within 1-2 weeks. Take your lantus insulin every night! Code Status:  Full Code     Activity: activity as tolerated    Diet: diabetic diet      Discharge Medications:     Current Discharge Medication List           Details   sennosides-docusate sodium (SENOKOT-S) 8.6-50 MG tablet Take 1 tablet by mouth daily  Qty: 10 tablet, Refills: 0              Details   insulin glargine (LANTUS) 100 UNIT/ML injection vial Inject 25 Units into the skin nightly  Qty: 6 vial, Refills: 1              Details   nitroGLYCERIN (NITROSTAT) 0.4 MG SL tablet Place 1 tablet under the tongue every 5 minutes as needed for Chest pain up to max of 3 total doses. If no relief after 1 dose, call 911. Qty: 25 tablet, Refills: 3    Associated Diagnoses: Coronary artery disease involving native heart without angina pectoris, unspecified vessel or lesion type      albuterol (PROVENTIL) (2.5 MG/3ML) 0.083% nebulizer solution INHALE 1 VIAL VIA NEBULIZER EVERY 6 HOURS AS NEEDED FOR WHEEZING  Qty: 100 each, Refills: 1      pravastatin (PRAVACHOL) 80 MG tablet TAKE 1 TABLET BY MOUTH ONCE DAILY  Qty: 90 tablet, Refills: 3      clopidogrel (PLAVIX) 75 MG tablet TAKE 1 TABLET BY MOUTH ONCE DAILY  Qty: 90 tablet, Refills: 3      metoprolol succinate (TOPROL XL) 25 MG extended release tablet Take 1 tablet by mouth daily  Qty: 30 tablet, Refills: 5      hydrOXYzine (VISTARIL) 50 MG capsule TAKE 1 TO 2 CAPSULES BY MOUTH NIGHTLY  Qty: 180 capsule, Refills: 10      fluticasone-umeclidin-vilant (TRELEGY ELLIPTA) 100-62.5-25 MCG/INH AEPB Inhale 1 puff into the lungs daily  Qty: 2 each, Refills: 0    Comments: 2 SAMPLES GIVEN.  Lot- [id number] exp-1/21      gabapentin (NEURONTIN) 100 MG capsule TAKE 1 TO 2 CAPSULES BY MOUTH THREE TIMES A DAY  Qty: 540 capsule, Refills: 1    Associated Diagnoses: Chronic pain syndrome      ondansetron (ZOFRAN ODT) 4 MG disintegrating tablet Take 1 tablet by mouth every 8 hours as needed for Nausea  Qty: 15 tablet, Refills: 0      hydrALAZINE (APRESOLINE) 50 MG tablet Take 1 tablet by mouth every 8 hours  Qty: 90 tablet, Refills: 3      calcium acetate (PHOSLO) 667 MG capsule Take 1 capsule by mouth 3 times daily (with meals)  Qty: 60 capsule, Refills: 3      traZODone (DESYREL) 150 MG tablet TAKE (1) TABLET BY MOUTH NIGHTLY  Qty: 90 tablet, Refills: 10      citalopram (CELEXA) 40 MG tablet TAKE (1) TABLET BY MOUTH DAILY  Qty: 90 tablet, Refills: 10    Associated Diagnoses: Depression, unspecified depression type      isosorbide mononitrate (IMDUR) 30 MG extended release tablet TAKE 1 TABLET BY MOUTH EVERY DAY  Qty: 90 tablet, Refills: 3      !! ACCU-CHEK FASTCLIX LANCETS MISC TEST 3-4 TIMES DAILY, AS DIRECTED  Qty: 300 each, Refills: 3    Comments: Please dispense what is covered by insurance      !! blood glucose test strips (FREESTYLE LITE) strip Daily As needed. Qty: 100 strip, Refills: 3    Comments: Please dispense what is covered by insurance      glucose monitoring kit (FREESTYLE) monitoring kit 1 kit by Does not apply route daily  Qty: 1 kit, Refills: 0    Comments: Please dispense what is covered by insurance      vitamin D (ERGOCALCIFEROL) 71992 units CAPS capsule TK 1 C PO WEEKLY  Refills: 11      B Complex-C-Folic Acid (VIRT-CAPS) 1 MG CAPS TK ONE C PO  QD  Qty: 90 capsule, Refills: 1      Pediatric Multivitamins-Fl (MULTI VIT/FL) 0.25 MG CHEW Take 25 mg by mouth daily  Qty: 90 tablet, Refills: 1      pantoprazole (PROTONIX) 40 MG tablet Take 1 tablet by mouth every morning (before breakfast)  Qty: 90 tablet, Refills: 1      Insulin Syringe-Needle U-100 30G X 1/2\" 0.5 ML MISC 1 each by Does not apply route daily  Qty: 100 each, Refills: 3      oxyCODONE-acetaminophen (PERCOCET) 7.5-325 MG per tablet Take 1 tablet by mouth every 4 hours as needed for Pain. Jeremi Polk Polyethylene Glycol 3350 GRAN       !! Glucose Blood (BLOOD GLUCOSE TEST STRIPS) STRP TEST 3-4 TIMES DAILY, AS DIRECTED  Qty: 100 strip, Refills: 3      Blood Glucose Monitoring Suppl ADAM USE AS DIRECTED. Qty: 1 Device, Refills: 0    Comments: WITH CONTROL SOLUTION. Alcohol Swabs PADS USE AS DIRECTED  Qty: 300 each, Refills: 3      albuterol sulfate  (90 Base) MCG/ACT inhaler Inhale 2 puffs into the lungs every 6 hours as needed for Wheezing  Qty: 1 Inhaler, Refills: 3      !! Lancets MISC Test daily  Qty: 100 each, Refills: 3      calcium carbonate (TUMS) 500 MG chewable tablet Take 1 tablet by mouth 3 times daily as needed for Heartburn. aspirin 81 MG chewable tablet Take 1 tablet by mouth daily. Qty: 30 tablet, Refills: 2      umeclidinium-vilanterol (ANORO ELLIPTA) 62.5-25 MCG/INH AEPB inhaler Inhale 1 puff into the lungs daily  Qty: 1 each, Refills: 0    Comments: 2 samples given:  Lot #[id number], Exp. 1/21 and Lot #[id number], Exp. 1/20      flunisolide (NASALIDE) 25 MCG/ACT (0.025%) SOLN Inhale 2 sprays into the lungs every 12 hours  Qty: 1 Bottle, Refills: 5      Tiotropium Bromide-Olodaterol (STIOLTO RESPIMAT) 2.5-2.5 MCG/ACT AERS Inhale 2 puffs into the lungs daily  Qty: 2 Inhaler, Refills: 0    Comments: 2 samples given:  Lot #[id number], Exp 7/21 & Lot #[id number], Exp 7/21      ipratropium-albuterol (DUONEB) 0.5-2.5 (3) MG/3ML SOLN nebulizer solution Inhale 3 mLs into the lungs every 6 hours as needed for Shortness of Breath  Qty: 360 mL, Refills: 1       !! - Potential duplicate medications found. Please discuss with provider. Time Spent on discharge is more than 30 minutes in the examination, evaluation, counseling and review of medications and discharge plan. Signed:    Dawson Alarcon MD   2/4/2020      Thank you Johnathan Spatz, MD for the opportunity to be involved in this patient's care.  If you have any questions or concerns please feel free to contact me at ([phone])

## 2020-02-04 NOTE — H&P
Hospital Medicine History & Physical      PCP: Peter Murphy MD    Date of Admission: 2/3/2020    Date of Service: Pt seen/examined on 2/4/2020 and Admitted to observation with expected LOS less than two midnights due to medical therapy. Chief Complaint: Bilateral hand tingling and chest pain    History Of Present Illness:      44 y.o. male, with past medical history of hypertension, CAD, obesity, diabetes and end-stage renal disease, who presented to [facility] with bilateral hand tingling and chest pain. History obtained from the patient and review of EMR. The patient stated he woke up this morning and began having bilateral hand numbness/tingling. He stated he does have ESRD and does dialysis on [address]. The patient stated day he had to have the dialysis nurse cut his dialysis short due to not feeling well. He stated roughly an hour after stopping dialysis, he began to experience midsternal chest pain that radiated across his chest and up the right side of his neck. The patient described the pain as sharp in nature. He stated he made an appointment with his PCP that he did go and see this afternoon and was referred to the emergency room. In the emergency room the patient did have an elevated troponin of 0.04. However, that appears to be chronic. His EKG appeared to be nonischemic. The patient will be admitted for further evaluation. He denied any other associated symptoms as well as any aggravating and/or alleviating factors. At the time of this assessment, the patient was resting comfortably in bed. He currently denies any chest pain, back pain, abdominal pain, shortness of breath, numbness, tingling, N/V/C/D, fever and/or chills.      Past Medical History:          Diagnosis Date    Ambulatory dysfunction     walker for long distances, SOB with distance    Aortic stenosis     echo 2017    Arthritis     hands and hips    Asthma     Bilateral hilar adenopathy syndrome 6/3/2013    CAD (coronary artery disease)     Dr. Gerardo Soto [facility]) 04/19/2019    EF= 43%    CHF (congestive heart failure) ([address]     Chronic pain     COPD (chronic obstructive pulmonary disease) ([address]     pulmonology Dr. Verner Alberts    Depression     Diabetes mellitus ([address]     borderline    Difficult intravenous access     Emphysema of lung ([address]     ESRD on peritoneal dialysis ([address]     Dr. Jane Llanos Fear of needles     Gastric ulcer     GERD (gastroesophageal reflux disease)     Heart valve problem     bicuspic valve    Hemodialysis patient ([address]     History of spinal fracture     work incident    Hx of blood clots     Bilateral lower extremities; stents in place    Hyperlipidemia     Hypertension     MI (myocardial infarction) ([address] 2019    has had 9 MIs. 2019 was the last    Neuromuscular disorder ([address]     due to CVA    Numbness and tingling in left arm     from fistula    Pneumonia     PONV (postoperative nausea and vomiting)     Prolonged emergence from general anesthesia     States requires more medication than most people    Sleep apnea     Uses CPAP    Stroke ([address]     7mm thalamic cva 2017 deficts left side, left side weakness    TIA (transient ischemic attack)     Unspecified diseases of blood and blood-forming organs      Past Surgical History:          Procedure Laterality Date    AORTIC VALVE REPLACEMENT N/A 10/15/2019    TRANSCATHETER AORTIC VALVE REPLACEMENT FEMORAL APPROACH performed by Queen Roberta MD at [address] [place] [address] Right 7/2/2019    PERITONEAL DIALYSIS CATHETER REMOVAL performed by Yon Ochoa MD at [facility] COLONOSCOPY  2/29/2015    WN    CORONARY ANGIOPLASTY WITH STENT PLACEMENT  05/26/15    CYST REMOVAL  08/14/2013    EXCISION CYSTS, NECK X2 AND ABDOMINAL benign    DIAGNOSTIC CARDIAC CATH LAB PROCEDURE      DIALYSIS FISTULA CREATION Left 10/30/2017    LEFT BRACHIAL CEPHALIC Анна Stevenson MD   Tiotropium Bromide-Olodaterol (STIOLTO RESPIMAT) 2.5-2.5 MCG/ACT AERS Inhale 2 puffs into the lungs daily 11/19/19   Jerson Kim MD   fluticasone-umeclidin-vilant (TRELEGY ELLIPTA) 100-62.5-25 MCG/INH AEPB Inhale 1 puff into the lungs daily 11/19/19   Jerson Kim MD   gabapentin (NEURONTIN) 100 MG capsule TAKE 1 TO 2 CAPSULES BY MOUTH THREE TIMES A DAY 11/6/19 12/6/19  Анна Stevenson MD   ondansetron (ZOFRAN ODT) 4 MG disintegrating tablet Take 1 tablet by mouth every 8 hours as needed for Nausea 11/6/19   JASE Oliveira   hydrALAZINE (APRESOLINE) 50 MG tablet Take 1 tablet by mouth every 8 hours 10/23/19   JAY Herr - CNP   calcium acetate (PHOSLO) 667 MG capsule Take 1 capsule by mouth 3 times daily (with meals) 10/23/19   JAY Herr CNP   traZODone (DESYREL) 150 MG tablet TAKE (1) TABLET BY MOUTH NIGHTLY 10/9/19   Анна Stevenson MD   citalopram (CELEXA) 40 MG tablet TAKE (1) TABLET BY MOUTH DAILY 10/9/19   Анна Stevenson MD   isosorbide mononitrate (IMDUR) 30 MG extended release tablet TAKE 1 TABLET BY MOUTH EVERY DAY 9/18/19   Luis Olivia MD   umeclidinium-vilanterol (ANORO ELLIPTA) 62.5-25 MCG/INH AEPB inhaler Inhale 1 puff into the lungs daily 8/27/19   Jerson Kim MD   ACCU-CHEK FASTCLIX LANCETS MISC TEST 3-4 TIMES DAILY, AS DIRECTED 8/19/19   Анна Stevenson MD   blood glucose test strips (FREESTYLE LITE) strip Daily As needed.  8/19/19   Анна Stevenson MD   glucose monitoring kit (FREESTYLE) monitoring kit 1 kit by Does not apply route daily 8/19/19   Анна Stevenson MD   vitamin D (ERGOCALCIFEROL) 32980 units CAPS capsule TK 1 C PO WEEKLY 6/2/19   Historical Provider, MD DENNIS Complex-C-Folic Acid (VIRT-CAPS) 1 MG CAPS TK ONE C PO  QD 5/30/19   Анна Stevenson MD   insulin glargine (LANTUS) 100 UNIT/ML injection vial Inject 10-15 Units into the skin nightly 5/30/19 11/6/19  Анна Stevenson MD   Pediatric Multivitamins-Fl (MULTI VIT/FL) 0.25 MG CHEW Take 25 mg by mouth daily 5/30/19   Josiah House MD   pantoprazole (PROTONIX) 40 MG tablet Take 1 tablet by mouth every morning (before breakfast) 5/30/19   Josiah House MD   flunisolide (NASALIDE) 25 MCG/ACT (0.025%) SOLN Inhale 2 sprays into the lungs every 12 hours 5/22/19   Liliya lOiver MD   Tiotropium Bromide-Olodaterol (STIOLTO RESPIMAT) 2.5-2.5 MCG/ACT AERS Inhale 2 puffs into the lungs daily 5/21/19   Liliya Oliver MD   DULoxetine (CYMBALTA) 30 MG extended release capsule Take 1 capsule by mouth daily 3/28/19   JAY Fitzgerald - CNP   Insulin Syringe-Needle U-100 30G X 1/2\" 0.5 ML MISC 1 each by Does not apply route daily 9/25/18   Watson Sandifer, MD   oxyCODONE-acetaminophen (PERCOCET) 7.5-325 MG per tablet Take 1 tablet by mouth every 4 hours as needed for Pain. Alvina Chicas Historical Provider, MD   Polyethylene Glycol 3350 GRAN  5/2/18   Historical Provider, MD   Glucose Blood (BLOOD GLUCOSE TEST STRIPS) STRP TEST 3-4 TIMES DAILY, AS DIRECTED 4/25/18   Ron Patel MD   Blood Glucose Monitoring Suppl ADAM USE AS DIRECTED. 4/25/18   Ron Patel MD   Alcohol Swabs PADS USE AS DIRECTED 4/25/18   Ron Patel MD   albuterol sulfate  (90 Base) MCG/ACT inhaler Inhale 2 puffs into the lungs every 6 hours as needed for Wheezing 11/8/17   Kiana Roger MD   ipratropium-albuterol (DUONEB) 0.5-2.5 (3) MG/3ML SOLN nebulizer solution Inhale 3 mLs into the lungs every 6 hours as needed for Shortness of Breath 10/15/17   Prashanth Ruiz MD   Lancets MISC Test daily 5/25/17   Michael Dickinson MD   calcium carbonate (TUMS) 500 MG chewable tablet Take 1 tablet by mouth 3 times daily as needed for Heartburn. Historical Provider, MD   aspirin 81 MG chewable tablet Take 1 tablet by mouth daily.   Patient taking differently: Take 81 mg by mouth daily Indications: stopped on 6/25 for surgery  5/14/13   Miguelito Campos MD     Allergies:  Morphine    Social History:      The patient currently lives at home with his wife    TOBACCO:   reports that he quit smoking about 7 months ago. His smoking use included cigarettes. He has a 3.30 pack-year smoking history. He has never used smokeless tobacco.  ETOH:   reports current alcohol use. E-Cigarettes Vaping or Juuling     Questions Responses    E-Cigarette Use Never User    Start Date     Does device contain nicotine? Never    Quit Date     E-Cigarette Type         Family History:      Reviewed in detail. Positive as follows:        Problem Relation Age of Onset    Diabetes Mother     Heart Disease Father     Kidney Disease Sister         stage 4-kidney failure    Cancer Sister     Heart Disease Sister     Obesity Sister     Cancer Sister     Heart Disease Sister     Obesity Sister     Alcohol Abuse Brother      REVIEW OF SYSTEMS:   Pertinent positives as noted in the HPI. All other systems reviewed and negative. PHYSICAL EXAM PERFORMED:    /78   Pulse 75   Temp 98 °F (36.7 °C) (Oral)   Resp 14   Ht 5' 9\" (1.753 m)   Wt 251 lb (113.9 kg)   SpO2 97%   BMI 37.07 kg/m²     General appearance: Pleasant obese male in no apparent distress, appears stated age and cooperative. HEENT:  Pupils equal, round, and reactive to light. Extra ocular muscles intact. Conjunctivae/corneas clear. Neck: Supple, with full range of motion. No jugular venous distention. Trachea midline. Respiratory:  Normal respiratory effort. Clear to auscultation, bilaterally without Rales/Wheezes/Rhonchi. Cardiovascular:  Regular rate and rhythm with normal S1/S2 without murmurs, rubs or gallops. Abdomen: Soft, obese, round non-tender, non-distended with normal bowel sounds. Musculoskeletal:  No clubbing, cyanosis or edema bilaterally. Full range of motion without deformity. Skin: Skin color, texture, turgor normal.  No significant rashes or lesions. Neurologic:  Neurovascularly intact.  Cranial nerves: II-XII intact, grossly non-focal.  Psychiatric:

## 2020-02-05 ENCOUNTER — TELEPHONE (OUTPATIENT)
Dept: FAMILY MEDICINE CLINIC | Age: 50
End: 2020-02-05

## 2020-02-05 NOTE — TELEPHONE ENCOUNTER
[address] Transitions Initial Follow Up Call    Outreach made within 2 business days of discharge: Yes    Patient: Jaimie Caraballo Patient : 1970   MRN: [id number]  Reason for Admission: There are no discharge diagnoses documented for the most recent discharge.   Discharge Date: 20       Spoke with: voicemail    Discharge department/facility: [facility]    Scheduled appointment with PCP within 7-14 days    Follow Up  Future Appointments   Date Time Provider Nelly Darby   3/10/2020  9:30 AM MD Yesica Carrillo   2020  1:20 PM Sumit Gudino MD AND [facility]       Russel Akers MA

## 2020-02-07 RX ORDER — NITROGLYCERIN 0.4 MG/1
TABLET SUBLINGUAL
Qty: 25 TABLET | Refills: 10 | Status: SHIPPED | OUTPATIENT
Start: 2020-02-07 | End: 2021-01-07

## 2020-02-10 RX ORDER — QUETIAPINE FUMARATE 25 MG/1
25 TABLET, FILM COATED ORAL EVERY EVENING
Qty: 30 TABLET | Refills: 5 | Status: SHIPPED | OUTPATIENT
Start: 2020-02-10 | End: 2020-04-21 | Stop reason: SDUPTHER

## 2020-02-21 ENCOUNTER — TELEPHONE (OUTPATIENT)
Dept: FAMILY MEDICINE CLINIC | Age: 50
End: 2020-02-21

## 2020-03-04 ENCOUNTER — TELEPHONE (OUTPATIENT)
Dept: FAMILY MEDICINE CLINIC | Age: 50
End: 2020-03-04

## 2020-03-04 ENCOUNTER — OFFICE VISIT (OUTPATIENT)
Dept: FAMILY MEDICINE CLINIC | Age: 50
End: 2020-03-04
Payer: COMMERCIAL

## 2020-03-04 VITALS
OXYGEN SATURATION: 98 % | DIASTOLIC BLOOD PRESSURE: 80 MMHG | HEART RATE: 92 BPM | WEIGHT: 258 LBS | SYSTOLIC BLOOD PRESSURE: 144 MMHG | BODY MASS INDEX: 38.1 KG/M2

## 2020-03-04 PROCEDURE — 1036F TOBACCO NON-USER: CPT | Performed by: FAMILY MEDICINE

## 2020-03-04 PROCEDURE — 99214 OFFICE O/P EST MOD 30 MIN: CPT | Performed by: FAMILY MEDICINE

## 2020-03-04 PROCEDURE — G8482 FLU IMMUNIZE ORDER/ADMIN: HCPCS | Performed by: FAMILY MEDICINE

## 2020-03-04 PROCEDURE — G8417 CALC BMI ABV UP PARAM F/U: HCPCS | Performed by: FAMILY MEDICINE

## 2020-03-04 PROCEDURE — 3017F COLORECTAL CA SCREEN DOC REV: CPT | Performed by: FAMILY MEDICINE

## 2020-03-04 PROCEDURE — G8427 DOCREV CUR MEDS BY ELIG CLIN: HCPCS | Performed by: FAMILY MEDICINE

## 2020-03-04 PROCEDURE — 2022F DILAT RTA XM EVC RTNOPTHY: CPT | Performed by: FAMILY MEDICINE

## 2020-03-04 PROCEDURE — 3046F HEMOGLOBIN A1C LEVEL >9.0%: CPT | Performed by: FAMILY MEDICINE

## 2020-03-04 RX ORDER — INSULIN GLARGINE 100 [IU]/ML
30 INJECTION, SOLUTION SUBCUTANEOUS NIGHTLY
Qty: 6 VIAL | Refills: 1 | Status: ON HOLD | OUTPATIENT
Start: 2020-03-04 | End: 2020-04-28 | Stop reason: HOSPADM

## 2020-03-04 RX ORDER — FLUCONAZOLE 150 MG/1
150 TABLET ORAL ONCE
Qty: 1 TABLET | Refills: 0 | Status: SHIPPED | OUTPATIENT
Start: 2020-03-04 | End: 2020-05-14

## 2020-03-04 RX ORDER — NYSTATIN 100000 U/G
CREAM TOPICAL
Qty: 60 G | Refills: 3 | Status: SHIPPED | OUTPATIENT
Start: 2020-03-04 | End: 2021-05-07 | Stop reason: ALTCHOICE

## 2020-03-04 NOTE — TELEPHONE ENCOUNTER
Jordan Bear with [employer] called and is requesting a change in the 2 prescription that were sent over. Jordan Bear states that patient's insurance prefers Novalog and Jodeane Greener. Jordan Bear states that the patient is okay with changing them as long as Dr. Arnold Rodriguez is. Please advise.

## 2020-03-04 NOTE — PROGRESS NOTES
Baldo Arce MD   pantoprazole (PROTONIX) 40 MG tablet Take 1 tablet by mouth every morning (before breakfast) Yes Baldo Arce MD   flunisolide (NASALIDE) 25 MCG/ACT (0.025%) SOLN Inhale 2 sprays into the lungs every 12 hours Yes Yas Anton MD   Tiotropium Bromide-Olodaterol (STIOLTO RESPIMAT) 2.5-2.5 MCG/ACT AERS Inhale 2 puffs into the lungs daily Yes Yas Anton MD   Insulin Syringe-Needle U-100 30G X 1/2\" 0.5 ML MISC 1 each by Does not apply route daily Yes Stan Forman MD   oxyCODONE-acetaminophen (PERCOCET) 7.5-325 MG per tablet Take 1 tablet by mouth every 4 hours as needed for Pain. . Yes Historical Provider, MD   Polyethylene Glycol 3350 GRAN  Yes Historical Provider, MD   Glucose Blood (BLOOD GLUCOSE TEST STRIPS) STRP TEST 3-4 TIMES DAILY, AS DIRECTED Yes Puma Nieves MD   Blood Glucose Monitoring Suppl ADAM USE AS DIRECTED. Yes Puma Nieves MD   Alcohol Swabs PADS USE AS DIRECTED Yes Puma Nieves MD   albuterol sulfate  (90 Base) MCG/ACT inhaler Inhale 2 puffs into the lungs every 6 hours as needed for Wheezing Yes Roxanne Espinosa MD   ipratropium-albuterol (DUONEB) 0.5-2.5 (3) MG/3ML SOLN nebulizer solution Inhale 3 mLs into the lungs every 6 hours as needed for Shortness of Breath Yes Janina Espino MD   Lancets MISC Test daily Yes Gildardo Quiles MD   calcium carbonate (TUMS) 500 MG chewable tablet Take 1 tablet by mouth 3 times daily as needed for Heartburn. Yes Historical Provider, MD   aspirin 81 MG chewable tablet Take 1 tablet by mouth daily.   Patient taking differently: Take 81 mg by mouth daily Indications: stopped on  for surgery  Yes Nichole Brownlee MD        Social History     Tobacco Use    Smoking status: Former Smoker     Packs/day: 0.10     Years: 33.00     Pack years: 3.30     Types: Cigarettes     Last attempt to quit: 2019     Years since quittin.7    Smokeless tobacco: Never Used    Tobacco comment: TRYING TO QUIT 3-7 a day   Substance Use Topics    Alcohol use: Yes     Alcohol/week: 0.0 standard drinks     Comment: occ        Vitals:    03/04/20 0802   BP: (!) 144/80   Pulse: 92   SpO2: 98%   Weight: 258 lb (117 kg)     Estimated body mass index is 38.1 kg/m² as calculated from the following:    Height as of 2/4/20: 5' 9\" (1.753 m). Weight as of this encounter: 258 lb (117 kg). Physical Exam  Vitals signs and nursing note reviewed. Constitutional:       Appearance: He is well-developed. HENT:      Head: Normocephalic and atraumatic. Right Ear: External ear normal.      Left Ear: External ear normal.      Nose: Nose normal.   Eyes:      Conjunctiva/sclera: Conjunctivae normal.      Pupils: Pupils are equal, round, and reactive to light. Neck:      Musculoskeletal: Normal range of motion and neck supple. Cardiovascular:      Rate and Rhythm: Normal rate and regular rhythm. Heart sounds: Normal heart sounds. No murmur. No friction rub. No gallop. Pulmonary:      Effort: Pulmonary effort is normal. No respiratory distress. Breath sounds: Normal breath sounds. No wheezing. Abdominal:      General: Bowel sounds are normal. There is no distension. Palpations: Abdomen is soft. Tenderness: There is no abdominal tenderness. Musculoskeletal: Normal range of motion. General: No tenderness. Skin:     General: Skin is warm and dry. Neurological:      Mental Status: He is alert and oriented to person, place, and time. Deep Tendon Reflexes: Reflexes are normal and symmetric. Psychiatric:         Behavior: Behavior normal.         Thought Content: Thought content normal.         Judgment: Judgment normal.         ASSESSMENT/PLAN:  1. Yeast dermatitis    - fluconazole (DIFLUCAN) 150 MG tablet; Take 1 tablet by mouth once for 1 dose  Dispense: 1 tablet; Refill: 0  - nystatin (MYCOSTATIN) 756266 UNIT/GM cream; Apply topically 2 times daily. Dispense: 60 g; Refill: 3    2.  Type 2 diabetes mellitus with diabetic nephropathy, with long-term current use of insulin (HCC)  - insulin glargine (LANTUS) 100 UNIT/ML injection vial; Inject 30 Units into the skin nightly  Dispense: 6 vial; Refill: 1  - insulin lispro (HUMALOG) 100 UNIT/ML injection vial; Inject 10 Units into the skin 3 times daily (before meals)  Dispense: 1 vial; Refill: 5      No follow-ups on file. An electronic signature was used to authenticate this note.     --Janiya Santos MD on 3/4/2020 at 8:09 AM

## 2020-03-09 NOTE — PROGRESS NOTES
[address]   Cardiac Followup    Referring Provider:  Lindsay Rodriguez MD     Chief Complaint   Patient presents with    3 Month Follow-Up    Congestive Heart Failure    Atrial Fibrillation    Cardiomyopathy    Coronary Artery Disease    Hypertension    Hyperlipidemia    Chest Pain     discomfort not pain    Shortness of Breath    Leg Pain     left worse than right    Edema     legs, feet        History of Present Illness:   Tia Espinoza is here for follow up for aortic stenosis- TAVR with 29 mm S3 in 10/2019, Coronary disease- nonobstructive by cath 1/23/17, PVD, hypertension, and end stage renal disease. He was hospitalized in 2/2020 for chest pain (occurred during HD) , arm pain, tingling and weakness. Symptoms were not felt to be cardiac. Today he states he has been having left leg pain when he walks. (HX of stents with Dr. Sam Lopez). Pain resolves when he stops and rest. Happens daily. Lasts few mins. Getting worse. He states he is currently off the kidney transplant list. He is on HD 3 days per week. He is tolerating his medications. He has tiago having dizziness and feels light headed today. His blood pressure has been labile. He has arm and hand tingling. Denies edema, palpitations and syncope.                 Past Medical History:   has a past medical history of Ambulatory dysfunction, Aortic stenosis, Arthritis, Asthma, Bilateral hilar adenopathy syndrome, CAD (coronary artery disease), Cardiomyopathy ([address] CHF (congestive heart failure) ([address] Chronic pain, COPD (chronic obstructive pulmonary disease) ([address] Depression, Diabetes mellitus ([address] Difficult intravenous access, Emphysema of lung ([address] ESRD (end stage renal disease) on dialysis ([address] Fear of needles, Gastric ulcer, GERD (gastroesophageal reflux disease), Heart valve problem, Hemodialysis patient ([address] History of spinal fracture, Hx of blood clots, Hyperlipidemia, Hypertension, MI (myocardial infarction) ([address] Neuromuscular disorder (HCC), Numbness and tingling in left arm, Pneumonia, PONV (postoperative nausea and vomiting), Prolonged emergence from general anesthesia, Sleep apnea, Stroke ([address] TIA (transient ischemic attack), and Unspecified diseases of blood and blood-forming organs. Surgical History:   has a past surgical history that includes Tonsillectomy; cyst removal (08/14/2013); Colonoscopy; Coronary angioplasty with stent (05/26/15); vascular surgery (aprx 2 years ago); Colonoscopy (2/29/2015); Upper gastrointestinal endoscopy (01/06/2016); Upper gastrointestinal endoscopy (01/29/2017); other surgical history (02/01/2017); Dialysis fistula creation (Left, 10/30/2017); Diagnostic Cardiac Cath Lab Procedure; other surgical history (2018); other surgical history (Bilateral, 06/26/2018); pr lap insertion tunneled intraperitoneal catheter (N/A, 9/21/2018); other surgical history (Right, 09/2018); Dialysis fistula creation (Left, 3/27/2019); other surgical history (05/28/2019); Endoscopy, colon, diagnostic; Catheter Removal (Right, 7/2/2019); and Aortic valve replacement (N/A, 10/15/2019). Social History:   reports that he has quit smoking. His smoking use included cigarettes. He has a 3.30 pack-year smoking history. He has never used smokeless tobacco. He reports current alcohol use. He reports that he does not use drugs. Family History:  family history includes Alcohol Abuse in his brother; Cancer in his sister and sister; Diabetes in his mother; Heart Disease in his father, sister, and sister; Kidney Disease in his sister; Obesity in his sister and sister. Home Medications:  Prior to Admission medications    Medication Sig Start Date End Date Taking?  Authorizing Provider   losartan (COZAAR) 50 MG tablet TAKE (1) TABLET BY MOUTH DAILY 3/10/20  Yes Vy Villa MD   pantoprazole (PROTONIX) 40 MG tablet TAKE (1) TABLET BY MOUTH EACH MORNING BEFORE BREAKFAST 3/10/20  Yes Vy Villa MD   albuterol (PROVENTIL) (2.5 MG/3ML) 0.083% nebulizer solution INHALE 1 VIAL VIA NEBULIZER EVERY 6 HOURS AS NEEDED FOR WHEEZING 3/10/20  Yes Yamileth Dumas MD   nystatin (MYCOSTATIN) 330924 UNIT/GM cream Apply topically 2 times daily. 3/4/20  Yes Yamileth Dumas MD   insulin glargine (LANTUS) 100 UNIT/ML injection vial Inject 30 Units into the skin nightly 3/4/20 4/3/20 Yes Yamileth Dumas MD   insulin lispro (HUMALOG) 100 UNIT/ML injection vial Inject 10 Units into the skin 3 times daily (before meals) 3/4/20  Yes Yamileth Dumas MD   Insulin Syringe-Needle U-100 30G X 1/2\" 0.5 ML MISC 1 each by Does not apply route daily 3/4/20  Yes Yamileth Dumas MD   insulin glargine Sumit Dimas) 100 UNIT/ML injection pen Inject 30 Units into the skin nightly 3/4/20  Yes Yamileth Dumas MD   insulin aspart (NOVOLOG FLEXPEN) 100 UNIT/ML injection pen Inject 10 Units into the skin 3 times daily (before meals) 3/4/20  Yes Yamileth Dumas MD   QUEtiapine (SEROQUEL) 25 MG tablet TAKE 1 TABLET BY MOUTH EVERY EVENING 2/10/20  Yes Yamileth Dumas MD   nitroGLYCERIN (NITROSTAT) 0.4 MG SL tablet DISSOLVE 1 TABLET UNDER THE TONGUE AS NEEDED FOR CHEST PAIN EVERY 5 MINUTES UP TO 3 TIMES.  IF NO RELIEF CALL 911. 2/7/20  Yes Yamileth Dumas MD   sennosides-docusate sodium (SENOKOT-S) 8.6-50 MG tablet Take 1 tablet by mouth daily 2/4/20  Yes Maureen Davidson MD   pravastatin (PRAVACHOL) 80 MG tablet TAKE 1 TABLET BY MOUTH ONCE DAILY 1/10/20  Yes Charly Soler MD   clopidogrel (PLAVIX) 75 MG tablet TAKE 1 TABLET BY MOUTH ONCE DAILY 1/10/20  Yes Charly Soler MD   metoprolol succinate (TOPROL XL) 25 MG extended release tablet Take 1 tablet by mouth daily 12/10/19  Yes Garcia Lopes MD   hydrOXYzine (VISTARIL) 50 MG capsule TAKE 1 TO 2 CAPSULES BY MOUTH NIGHTLY 12/4/19  Yes Yamileth Dumas MD   fluticasone-umeclidin-vilant (TRELEGY ELLIPTA) 674-24.7-83 MCG/INH AEPB Inhale 1 puff into the lungs daily 11/19/19  Yes Marie Bland MD   ondansetron (ZOFRAN ODT) 4 MG disintegrating tablet Take 1 tablet by mouth every 8 hours as needed for Nausea 11/6/19  Yes JASE Rivas   hydrALAZINE (APRESOLINE) 50 MG tablet Take 1 tablet by mouth every 8 hours 10/23/19  Yes JAY Ricks CNP   calcium acetate (PHOSLO) 667 MG capsule Take 1 capsule by mouth 3 times daily (with meals) 10/23/19  Yes JAY Ricks CNP   traZODone (DESYREL) 150 MG tablet TAKE (1) TABLET BY MOUTH NIGHTLY 10/9/19  Yes Vy Villa MD   citalopram (CELEXA) 40 MG tablet TAKE (1) TABLET BY MOUTH DAILY 10/9/19  Yes Vy Villa MD   isosorbide mononitrate (IMDUR) 30 MG extended release tablet TAKE 1 TABLET BY MOUTH EVERY DAY 9/18/19  Yes Tanna Gore MD   umeclidinium-vilanterol (ANORO ELLIPTA) 62.5-25 MCG/INH AEPB inhaler Inhale 1 puff into the lungs daily 8/27/19  Yes Veronica Early MD   ACCU-CHEK FASTCLIX LANCETS MISC TEST 3-4 TIMES DAILY, AS DIRECTED 8/19/19  Yes Vy Villa MD   blood glucose test strips (FREESTYLE LITE) strip Daily As needed. 8/19/19  Yes Vy Villa MD   glucose monitoring kit (FREESTYLE) monitoring kit 1 kit by Does not apply route daily 8/19/19  Yes Vy Villa MD   vitamin D (ERGOCALCIFEROL) 59633 units CAPS capsule TK 1 C PO WEEKLY 6/2/19  Yes Gerson Velazquez MD   B Complex-C-Folic Acid (VIRT-CAPS) 1 MG CAPS TK ONE C PO  QD 5/30/19  Yes Vy Villa MD   Pediatric Multivitamins-Fl (MULTI VIT/FL) 0.25 MG CHEW Take 25 mg by mouth daily 5/30/19  Yes Vy Villa MD   flunisolide (NASALIDE) 25 MCG/ACT (0.025%) SOLN Inhale 2 sprays into the lungs every 12 hours 5/22/19  Yes Veronica Early MD   Tiotropium Bromide-Olodaterol (STIOLTO RESPIMAT) 2.5-2.5 MCG/ACT AERS Inhale 2 puffs into the lungs daily 5/21/19  Yes Veronica Early MD   oxyCODONE-acetaminophen (PERCOCET) 7.5-325 MG per tablet Take 1 tablet by mouth every 4 hours as needed for Pain. Osiel Solis Historical Provider, MD   Polyethylene Glycol 3350 GRAN  5/2/18  Yes Historical Provider, MD   Glucose Blood (BLOOD GLUCOSE TEST STRIPS) STRP TEST 3-4 TIMES DAILY, AS DIRECTED 4/25/18  Yes Liz Becerra MD   Blood Glucose Monitoring Suppl ADAM USE AS DIRECTED. 4/25/18  Yes Liz Becerra MD   Alcohol Swabs PADS USE AS DIRECTED 4/25/18  Yes Liz Becerra MD   albuterol sulfate  (90 Base) MCG/ACT inhaler Inhale 2 puffs into the lungs every 6 hours as needed for Wheezing 11/8/17  Yes Precious Jain MD   ipratropium-albuterol (DUONEB) 0.5-2.5 (3) MG/3ML SOLN nebulizer solution Inhale 3 mLs into the lungs every 6 hours as needed for Shortness of Breath 10/15/17  Yes Hernando Santos MD   Lancets MISC Test daily 5/25/17  Yes Amish Plaza MD   calcium carbonate (TUMS) 500 MG chewable tablet Take 1 tablet by mouth 3 times daily as needed for Heartburn. Yes Historical Provider, MD   aspirin 81 MG chewable tablet Take 1 tablet by mouth daily. Patient taking differently: Take 81 mg by mouth daily Indications: stopped on 6/25 for surgery  5/14/13  Yes Facundo Howard MD   gabapentin (NEURONTIN) 100 MG capsule TAKE 1 TO 2 CAPSULES BY MOUTH THREE TIMES A DAY 11/6/19 3/4/20  Gabrielle Rod MD        Allergies:  Morphine     Review of Systems:   · Constitutional: there has been no unanticipated weight loss. There's been no change in energy level, sleep pattern, or activity level. · Eyes: No visual changes or diplopia. No scleral icterus. · ENT: No Headaches, hearing loss or vertigo. No mouth sores or sore throat. · Cardiovascular: Reviewed in HPI  · Respiratory: No cough or wheezing, no sputum production. No hematemesis. · Gastrointestinal: No abdominal pain, appetite loss, blood in stools. No change in bowel or bladder habits. · Genitourinary: No dysuria, trouble voiding, or hematuria. · Musculoskeletal:  No gait disturbance, weakness or joint complaints. · Integumentary: No rash or pruritis.   · Neurological: No headache, diplopia, change in muscle strength, numbness or tingling. No change in gait, balance, coordination, mood, affect, memory, mentation, behavior. · Psychiatric: No anxiety, no depression. · Endocrine: No malaise, fatigue or temperature intolerance. No excessive thirst, fluid intake, or urination. No tremor. · Hematologic/Lymphatic: No abnormal bruising or bleeding, blood clots or swollen lymph nodes. · Allergic/Immunologic: No nasal congestion or hives. Physical Examination:    Vitals:    03/10/20 0916   BP: 110/62   Pulse: 60   SpO2: 93%        Constitutional and General Appearance: NAD   Respiratory:  · Normal excursion and expansion without use of accessory muscles  · Resp Auscultation: Normal breath sounds without dullness  Cardiovascular:  · The apical impulses not displaced  · Heart tones are crisp and normal  · Cervical veins are not engorged  · The carotid upstroke is normal in amplitude and contour without delay. Right carotid bruit   · Normal S1S2, No S3, 2/6 murmur  · Peripheral pulses are symmetrical and full  · There is no clubbing, cyanosis of the extremities. · Trace edema  · Femoral Arteries: 2+ and equal  · Pedal Pulses: 2+ and equal   Abdomen:  · No masses or tenderness  · Liver/Spleen: No Abnormalities Noted  Neurological/Psychiatric:  · Alert and oriented in all spheres  · Moves all extremities well  · Exhibits normal gait balance and coordination  · No abnormalities of mood, affect, memory, mentation, or behavior are noted    cardiac catheterization May 2013, which showed mild nonobstructive disease. Cath 5/2015  LM <20%  LAD 70% mid. FFR 0.77  D1 50% prox  Cx 20%  RCA 20%  LVEDP 22mmHg  RA 9, RV 42/10, PA 44/16/31, W 18  PA sat 63%  PTCA LAD  3.0 x 15 PATRICIA  prox portion post 3.5 x 8 NC balloon    Myoview 6/2015  There is a very small and mild fixed posterior-basal defect consistent with   fibrosis. There is no evidence for ischemia. Left ventricular function is reduced with and estimated LVEF of 40%.    The LV is severely dilated. Cardiac cath 1/23/17  LM <20%  LAD 30% w/ patent stent  Cx 20-30%  RCA 20-30%  LVEDP 19  RA 8,  RV 41/10,  PA 45/14/31,  W 15  Nonobstructive CAD  Mild elevated right heart pressures    Stress Echocardiogram 9/17/18  Baseline nonspecific ST changes. PVC's during infusion. Patient developed bilateral jaw tightness 4/10, likely related to   Dobutamine. Symptoms resolved with rest.   No EKG changes of stress induced left ventricular ischemia. Dobutamine stress echo shows normal baseline EF at 55%. No regional wall   motion abnormality at rest or post dobutamine infusion. Normal hyperdynamic response to dobutamine stress. Definity contrast is   used. Normal dobutamine stress echocardiogram.      Stress test 4/19/19  Summary  Abnormal low to moderate risk myocardial perfusion study. There is a mild inferior and apical defect consistent with attenuation  artifact versus prior subendocardial infarction. There is no evidence for ischemia. The estimated left ventricular function is 43%. The left ventricular size is mildly dilated. Carotid dopplers 4/19/19  Summary    1. There is minimal plaque seen in the right internal carotid artery with an  estimated diameter reduction of <50%. 2. There is minimal plaque seen in the left internal carotid artery with an  estimated diameter reduction of <50%. 3. The right vertebral artery is patent with antegrade flow. The left  vertebral artery was not seen due to limitations     Arterial dopplers 4/19/19  Summary    1. Limited exam due to nondiagnostic ankle brachial indices. There is  evidence of arterial disease involving the right lower extremity. There is  evidence of inflow and calf vessel disease. 2. Limited exam due to nondiagnostic ankle brachial indices. There is  evidence to suggest a 50-74% stenosis of the proximal common femoral artery  and an occlusion of the mid posterior tibial artery of the left lower  extremity.        Echo

## 2020-03-10 ENCOUNTER — OFFICE VISIT (OUTPATIENT)
Dept: CARDIOLOGY CLINIC | Age: 50
End: 2020-03-10
Payer: COMMERCIAL

## 2020-03-10 ENCOUNTER — HOSPITAL ENCOUNTER (EMERGENCY)
Age: 50
Discharge: HOME | End: 2020-03-10
Payer: COMMERCIAL

## 2020-03-10 ENCOUNTER — APPOINTMENT (OUTPATIENT)
Dept: GENERAL RADIOLOGY | Age: 50
End: 2020-03-10
Payer: COMMERCIAL

## 2020-03-10 VITALS
BODY MASS INDEX: 37.47 KG/M2 | HEART RATE: 60 BPM | DIASTOLIC BLOOD PRESSURE: 62 MMHG | OXYGEN SATURATION: 93 % | SYSTOLIC BLOOD PRESSURE: 110 MMHG | WEIGHT: 253 LBS | HEIGHT: 69 IN

## 2020-03-10 VITALS
HEART RATE: 52 BPM | WEIGHT: 250 LBS | SYSTOLIC BLOOD PRESSURE: 123 MMHG | DIASTOLIC BLOOD PRESSURE: 67 MMHG | BODY MASS INDEX: 37.03 KG/M2 | OXYGEN SATURATION: 97 % | RESPIRATION RATE: 16 BRPM | TEMPERATURE: 98.1 F | HEIGHT: 69 IN

## 2020-03-10 LAB
A/G RATIO: 1.1 (ref 1.1–2.2)
ALBUMIN SERPL-MCNC: 3.7 G/DL (ref 3.4–5)
ALP BLD-CCNC: 112 U/L (ref 40–129)
ALT SERPL-CCNC: 12 U/L (ref 10–40)
AMORPHOUS: ABNORMAL /HPF
ANION GAP SERPL CALCULATED.3IONS-SCNC: 17 MMOL/L (ref 3–16)
AST SERPL-CCNC: 22 U/L (ref 15–37)
BACTERIA: ABNORMAL /HPF
BASOPHILS ABSOLUTE: 0.1 K/UL (ref 0–0.2)
BASOPHILS RELATIVE PERCENT: 0.8 %
BILIRUB SERPL-MCNC: <0.2 MG/DL (ref 0–1)
BILIRUBIN URINE: NEGATIVE
BLOOD, URINE: NEGATIVE
BUN BLDV-MCNC: 46 MG/DL (ref 7–20)
CALCIUM SERPL-MCNC: 9 MG/DL (ref 8.3–10.6)
CASTS 2: ABNORMAL /LPF
CASTS UA: ABNORMAL /LPF
CASTS: ABNORMAL /LPF
CHLORIDE BLD-SCNC: 89 MMOL/L (ref 99–110)
CLARITY: ABNORMAL
CO2: 22 MMOL/L (ref 21–32)
COLOR: YELLOW
CREAT SERPL-MCNC: 4 MG/DL (ref 0.9–1.3)
EKG ATRIAL RATE: 52 BPM
EKG DIAGNOSIS: NORMAL
EKG P AXIS: 49 DEGREES
EKG P-R INTERVAL: 218 MS
EKG Q-T INTERVAL: 486 MS
EKG QRS DURATION: 90 MS
EKG QTC CALCULATION (BAZETT): 451 MS
EKG R AXIS: -1 DEGREES
EKG T AXIS: 130 DEGREES
EKG VENTRICULAR RATE: 52 BPM
EOSINOPHILS ABSOLUTE: 0.3 K/UL (ref 0–0.6)
EOSINOPHILS RELATIVE PERCENT: 2.2 %
EPITHELIAL CELLS, UA: ABNORMAL /HPF (ref 0–5)
GFR AFRICAN AMERICAN: 19
GFR NON-AFRICAN AMERICAN: 16
GLOBULIN: 3.4 G/DL
GLUCOSE BLD-MCNC: 306 MG/DL (ref 70–99)
GLUCOSE URINE: 100 MG/DL
HCT VFR BLD CALC: 31.9 % (ref 40.5–52.5)
HEMOGLOBIN: 10.6 G/DL (ref 13.5–17.5)
KETONES, URINE: ABNORMAL MG/DL
LEUKOCYTE ESTERASE, URINE: NEGATIVE
LYMPHOCYTES ABSOLUTE: 1.6 K/UL (ref 1–5.1)
LYMPHOCYTES RELATIVE PERCENT: 10.6 %
MCH RBC QN AUTO: 30.8 PG (ref 26–34)
MCHC RBC AUTO-ENTMCNC: 33.3 G/DL (ref 31–36)
MCV RBC AUTO: 92.4 FL (ref 80–100)
MICROSCOPIC EXAMINATION: YES
MONOCYTES ABSOLUTE: 0.8 K/UL (ref 0–1.3)
MONOCYTES RELATIVE PERCENT: 5.1 %
NEUTROPHILS ABSOLUTE: 12.6 K/UL (ref 1.7–7.7)
NEUTROPHILS RELATIVE PERCENT: 81.3 %
NITRITE, URINE: NEGATIVE
PDW BLD-RTO: 13.4 % (ref 12.4–15.4)
PH UA: 5 (ref 5–8)
PLATELET # BLD: 258 K/UL (ref 135–450)
PLATELET SLIDE REVIEW: ADEQUATE
PMV BLD AUTO: 9 FL (ref 5–10.5)
POTASSIUM REFLEX MAGNESIUM: 5 MMOL/L (ref 3.5–5.1)
PROTEIN UA: >=300 MG/DL
RBC # BLD: 3.45 M/UL (ref 4.2–5.9)
RBC UA: ABNORMAL /HPF (ref 0–4)
RENAL EPITHELIAL, UA: ABNORMAL /HPF (ref 0–1)
SLIDE REVIEW: ABNORMAL
SODIUM BLD-SCNC: 128 MMOL/L (ref 136–145)
SPECIFIC GRAVITY UA: >=1.03 (ref 1–1.03)
TOTAL PROTEIN: 7.1 G/DL (ref 6.4–8.2)
TROPONIN: 0.04 NG/ML
URINE TYPE: ABNORMAL
UROBILINOGEN, URINE: 0.2 E.U./DL
WBC # BLD: 15.5 K/UL (ref 4–11)
WBC UA: ABNORMAL /HPF (ref 0–5)

## 2020-03-10 PROCEDURE — G8417 CALC BMI ABV UP PARAM F/U: HCPCS | Performed by: INTERNAL MEDICINE

## 2020-03-10 PROCEDURE — 93010 ELECTROCARDIOGRAM REPORT: CPT | Performed by: INTERNAL MEDICINE

## 2020-03-10 PROCEDURE — 3017F COLORECTAL CA SCREEN DOC REV: CPT | Performed by: INTERNAL MEDICINE

## 2020-03-10 PROCEDURE — G8482 FLU IMMUNIZE ORDER/ADMIN: HCPCS | Performed by: INTERNAL MEDICINE

## 2020-03-10 PROCEDURE — 71046 X-RAY EXAM CHEST 2 VIEWS: CPT

## 2020-03-10 PROCEDURE — 93005 ELECTROCARDIOGRAM TRACING: CPT | Performed by: NURSE PRACTITIONER

## 2020-03-10 PROCEDURE — 84484 ASSAY OF TROPONIN QUANT: CPT

## 2020-03-10 PROCEDURE — 81001 URINALYSIS AUTO W/SCOPE: CPT

## 2020-03-10 PROCEDURE — 80053 COMPREHEN METABOLIC PANEL: CPT

## 2020-03-10 PROCEDURE — 99214 OFFICE O/P EST MOD 30 MIN: CPT | Performed by: INTERNAL MEDICINE

## 2020-03-10 PROCEDURE — G8427 DOCREV CUR MEDS BY ELIG CLIN: HCPCS | Performed by: INTERNAL MEDICINE

## 2020-03-10 PROCEDURE — 85025 COMPLETE CBC W/AUTO DIFF WBC: CPT

## 2020-03-10 PROCEDURE — 99285 EMERGENCY DEPT VISIT HI MDM: CPT

## 2020-03-10 PROCEDURE — 1036F TOBACCO NON-USER: CPT | Performed by: INTERNAL MEDICINE

## 2020-03-10 RX ORDER — PANTOPRAZOLE SODIUM 40 MG/1
TABLET, DELAYED RELEASE ORAL
Qty: 90 TABLET | Refills: 10 | Status: SHIPPED | OUTPATIENT
Start: 2020-03-10 | End: 2021-03-09 | Stop reason: SDUPTHER

## 2020-03-10 RX ORDER — LOSARTAN POTASSIUM 50 MG/1
TABLET ORAL
Qty: 90 TABLET | Refills: 10 | Status: SHIPPED | OUTPATIENT
Start: 2020-03-10 | End: 2021-03-25

## 2020-03-10 RX ORDER — ALBUTEROL SULFATE 2.5 MG/3ML
SOLUTION RESPIRATORY (INHALATION)
Qty: 300 ML | Refills: 10 | Status: SHIPPED | OUTPATIENT
Start: 2020-03-10 | End: 2021-06-02 | Stop reason: SDUPTHER

## 2020-03-10 RX ORDER — HYDRALAZINE HYDROCHLORIDE 50 MG/1
25 TABLET, FILM COATED ORAL EVERY 8 HOURS SCHEDULED
Qty: 90 TABLET | Refills: 0 | Status: SHIPPED | OUTPATIENT
Start: 2020-03-10 | End: 2020-09-28

## 2020-03-10 RX ORDER — OXYCODONE AND ACETAMINOPHEN 7.5; 325 MG/1; MG/1
1 TABLET ORAL ONCE
Status: DISCONTINUED | OUTPATIENT
Start: 2020-03-10 | End: 2020-03-10 | Stop reason: HOSPADM

## 2020-03-10 ASSESSMENT — PAIN DESCRIPTION - LOCATION
LOCATION: BACK
LOCATION: BACK

## 2020-03-10 ASSESSMENT — PAIN SCALES - GENERAL
PAINLEVEL_OUTOF10: 10
PAINLEVEL_OUTOF10: 10

## 2020-03-10 ASSESSMENT — PAIN DESCRIPTION - PAIN TYPE
TYPE: CHRONIC PAIN
TYPE: CHRONIC PAIN

## 2020-03-10 ASSESSMENT — PAIN DESCRIPTION - ORIENTATION: ORIENTATION: LOWER

## 2020-03-10 NOTE — ED NOTES
Jarocho Martínez NP in room assessing pt and assessing pulses with doppler.      Dolores Alegre, RN  03/10/20 0143

## 2020-03-10 NOTE — ED NOTES
Pain medication not administered at this time as pt states he does not have a ride home. Pt informed of hospital policy requiring a ride home after opioid administration. Pt agreeable to delay. Kath Marks NP informed of delay.      Naida Asencio RN  03/10/20 [id number]

## 2020-03-10 NOTE — LETTER
infarction) ([facility] [address] Neuromuscular disorder ([facility] [address] Numbness and tingling in left arm, Pneumonia, PONV (postoperative nausea and vomiting), Prolonged emergence from general anesthesia, Sleep apnea, Stroke ([facility] [address] TIA (transient ischemic attack), and Unspecified diseases of blood and blood-forming organs. Surgical History:   has a past surgical history that includes Tonsillectomy; cyst removal (08/14/2013); Colonoscopy; Coronary angioplasty with stent (05/26/15); vascular surgery (aprx 2 years ago); Colonoscopy (2/29/2015); Upper gastrointestinal endoscopy (01/06/2016); Upper gastrointestinal endoscopy (01/29/2017); other surgical history (02/01/2017); Dialysis fistula creation (Left, 10/30/2017); Diagnostic Cardiac Cath Lab Procedure; other surgical history (2018); other surgical history (Bilateral, 06/26/2018); pr lap insertion tunneled intraperitoneal catheter (N/A, 9/21/2018); other surgical history (Right, 09/2018); Dialysis fistula creation (Left, 3/27/2019); other surgical history (05/28/2019); Endoscopy, colon, diagnostic; Catheter Removal (Right, 7/2/2019); and Aortic valve replacement (N/A, 10/15/2019). Social History:   reports that he has quit smoking. His smoking use included cigarettes. He has a 3.30 pack-year smoking history. He has never used smokeless tobacco. He reports current alcohol use. He reports that he does not use drugs. Family History:  family history includes Alcohol Abuse in his brother; Cancer in his sister and sister; Diabetes in his mother; Heart Disease in his father, sister, and sister; Kidney Disease in his sister; Obesity in his sister and sister. Home Medications:  Prior to Admission medications    Medication Sig Start Date End Date Taking?  Authorizing Provider   losartan (COZAAR) 50 MG tablet TAKE (1) TABLET BY MOUTH DAILY 3/10/20  Yes Vy Villa MD   pantoprazole (PROTONIX) 40 MG tablet TAKE (1) TABLET BY MOUTH EACH MORNING Inhale 1 puff into the lungs daily 11/19/19  Yes Rui Perez MD   ondansetron (ZOFRAN ODT) 4 MG disintegrating tablet Take 1 tablet by mouth every 8 hours as needed for Nausea 11/6/19  Yes JASE Murry   hydrALAZINE (APRESOLINE) 50 MG tablet Take 1 tablet by mouth every 8 hours 10/23/19  Yes JAY Ramos CNP   calcium acetate (PHOSLO) 667 MG capsule Take 1 capsule by mouth 3 times daily (with meals) 10/23/19  Yes JAY Ramos CNP   traZODone (DESYREL) 150 MG tablet TAKE (1) TABLET BY MOUTH NIGHTLY 10/9/19  Yes Alexandria Ochoa MD   citalopram (CELEXA) 40 MG tablet TAKE (1) TABLET BY MOUTH DAILY 10/9/19  Yes Alexandria Ochoa MD   isosorbide mononitrate (IMDUR) 30 MG extended release tablet TAKE 1 TABLET BY MOUTH EVERY DAY 9/18/19  Yes Peter Irizarry MD   umeclidinium-vilanterol (ANORO ELLIPTA) 62.5-25 MCG/INH AEPB inhaler Inhale 1 puff into the lungs daily 8/27/19  Yes Rui Perez MD   ACCU-CHEK FASTCLIX LANCETS MISC TEST 3-4 TIMES DAILY, AS DIRECTED 8/19/19  Yes Alexandria Ochoa MD   blood glucose test strips (FREESTYLE LITE) strip Daily As needed.  8/19/19  Yes Alexandria Ochoa MD   glucose monitoring kit (FREESTYLE) monitoring kit 1 kit by Does not apply route daily 8/19/19  Yes Alexandria Ochoa MD   vitamin D (ERGOCALCIFEROL) 26154 units CAPS capsule TK 1 C PO WEEKLY 6/2/19  Yes Gerson Velazquez MD   B Complex-C-Folic Acid (VIRT-CAPS) 1 MG CAPS TK ONE C PO  QD 5/30/19  Yes Alexandria Ochoa MD   Pediatric Multivitamins-Fl (MULTI VIT/FL) 0.25 MG CHEW Take 25 mg by mouth daily 5/30/19  Yes Alexandria Ochoa MD   flunisolide (NASALIDE) 25 MCG/ACT (0.025%) SOLN Inhale 2 sprays into the lungs every 12 hours 5/22/19  Yes Rui Perez MD   Tiotropium Bromide-Olodaterol (STIOLTO RESPIMAT) 2.5-2.5 MCG/ACT AERS Inhale 2 puffs into the lungs daily 5/21/19  Yes Rui Perez MD   oxyCODONE-acetaminophen (PERCOCET) 7.5-325 MG per tablet Take 1 tablet by mouth every 4 hours as needed for Pain. Josiah Brown Historical Provider, MD   Polyethylene Glycol 3350 GRAN  5/2/18  Yes Historical Provider, MD   Glucose Blood (BLOOD GLUCOSE TEST STRIPS) STRP TEST 3-4 TIMES DAILY, AS DIRECTED 4/25/18  Yes Puma Nieves MD   Blood Glucose Monitoring Suppl ADAM USE AS DIRECTED. 4/25/18  Yes Puma Nieves MD   Alcohol Swabs PADS USE AS DIRECTED 4/25/18  Yes Puma Nieves MD   albuterol sulfate  (90 Base) MCG/ACT inhaler Inhale 2 puffs into the lungs every 6 hours as needed for Wheezing 11/8/17  Yes Roxanne Espinosa MD   ipratropium-albuterol (DUONEB) 0.5-2.5 (3) MG/3ML SOLN nebulizer solution Inhale 3 mLs into the lungs every 6 hours as needed for Shortness of Breath 10/15/17  Yes Janina Espino MD   Lancets MISC Test daily 5/25/17  Yes Gildardo Quiles MD   calcium carbonate (TUMS) 500 MG chewable tablet Take 1 tablet by mouth 3 times daily as needed for Heartburn. Yes Historical Provider, MD   aspirin 81 MG chewable tablet Take 1 tablet by mouth daily. Patient taking differently: Take 81 mg by mouth daily Indications: stopped on 6/25 for surgery  5/14/13  Yes Nichole Brownlee MD   gabapentin (NEURONTIN) 100 MG capsule TAKE 1 TO 2 CAPSULES BY MOUTH THREE TIMES A DAY 11/6/19 3/4/20  Baldo Arce MD        Allergies:  Morphine     Review of Systems:   · Constitutional: there has been no unanticipated weight loss. There's been no change in energy level, sleep pattern, or activity level. · Eyes: No visual changes or diplopia. No scleral icterus. · ENT: No Headaches, hearing loss or vertigo. No mouth sores or sore throat. · Cardiovascular: Reviewed in HPI  · Respiratory: No cough or wheezing, no sputum production. No hematemesis. · Gastrointestinal: No abdominal pain, appetite loss, blood in stools. No change in bowel or bladder habits. · Genitourinary: No dysuria, trouble voiding, or hematuria. which showed an ejection fraction of 55% to 60%. 3. Aortic stenosis- sresolved post TAVR   4. Left ventricular hypertrophy. 5. Diabetes. Hypertension BP low. Pt reports labile   Chronic obstructive pulmonary disease. Peripheral vascular disease. He is status post stents to his lower extremities. Sleep apnea   Chest pain - little change post TAVR. Noncardiac component   Pre-op kidney txp - ok to proceed from cardiac standpoint  Dizziness - likely exacerbated by labile BP  Left leg pain c/w claudication    Plan:  Arterial doppler left leg   Follow up with Dr. Varinder Bob blood pressure with nephology   Stop Imdur   Check blood pressure at home   Diet and exercise encouraged   Follow up with me in 1 year     Thank you for allowing me to participate in the care of this individual.      Mami Johnson.  Minnie Scott M.D., Crystal Rivas

## 2020-03-10 NOTE — ED PROVIDER NOTES
Evaluated by [id number] [facility] Provider    [address] [facility]  ED    CHIEF COMPLAINT  Dizziness (c/o feeling lightheaded and dizzy for the last couple days. c/o left leg cramping when walking for a couple weeks. c/o bilat arms and hand numbness for a couple weeks. had a routine appt at cardio today and was sent over for further eval due to these s/s. ); Leg Pain; and Numbness    HISTORY OF PRESENT ILLNESS  Agustín Rebolledo is a 44 y.o. male who presents to the ED complaining of dizziness. If he walks he gets light headed and dizzy, when he does walk his left leg cramps up he has to stop and rest, chest feels tight as well. Chronic back and hip issues, has not had a pain pill since last night and having pain currently. With 10 steps his left leg cramps bad in the upper thigh, has stents in his groin area, been there a year or two. Has had cramping prior to stents being placed-it was done here, unsure who did this procedure. This has been going on for about 1 month, gradually worsening. His whole leg tingles. Dizziness/light headed when walking, can not focus on anything, things start to move and spin around. This starts with only a couple of steps. It happens at rest as well. Very much worsens with change of positions. This has been present for about 1 month, gradually worsening. Chest feels tight, this has been over a day or two. Reports BP all over the place. Tightness is in the middle of chest and does not radiate. It has not completely gone away since it started. Neck does feel tight as well. Reports SOB. Riaz associated nausea, vomiting, diaphoresis. Currently both arms and hands are tingling. History of TAVR, on dialysis, CAD, COPD, CHF    The patient is currently rating their pain as 10/10 and describes it as a aching type of pain. The patient denies other complaints, modifying factors or associated symptoms. The patient arrived to the ED via private car.  Patient wheeled over by cardiology for further evaluation.     PAST MEDICAL HISTORY    Past Medical History:   Diagnosis Date    Ambulatory dysfunction     walker for long distances, SOB with distance    Aortic stenosis     echo 2017    Arthritis     hands and hips    Asthma     Bilateral hilar adenopathy syndrome 6/3/2013    CAD (coronary artery disease)     Dr. Marybeth Catherine [facility]) 04/19/2019    EF= 43%    CHF (congestive heart failure) (HCC)     Chronic pain     COPD (chronic obstructive pulmonary disease) ([address]     pulmonology Dr. Selma Cruz    Depression     Diabetes mellitus ([address]     borderline    Difficult intravenous access     Emphysema of lung ([address]     ESRD (end stage renal disease) on dialysis ([address]     MWF    Fear of needles     Gastric ulcer     GERD (gastroesophageal reflux disease)     Heart valve problem     bicuspic valve    Hemodialysis patient ([address]     History of spinal fracture     work incident    Hx of blood clots     Bilateral lower extremities; stents in place    Hyperlipidemia     Hypertension     MI (myocardial infarction) ([address] 2019    has had 9 MIs. 2019 was the last    Neuromuscular disorder ([address]     due to CVA    Numbness and tingling in left arm     from fistula    Pneumonia     PONV (postoperative nausea and vomiting)     Prolonged emergence from general anesthesia     States requires more medication than most people    Sleep apnea     Uses CPAP    Stroke ([address]     7mm thalamic cva 2017 deficts left side, left side weakness    TIA (transient ischemic attack)     Unspecified diseases of blood and blood-forming organs        SURGICAL HISTORY    Past Surgical History:   Procedure Laterality Date    AORTIC VALVE REPLACEMENT N/A 10/15/2019    TRANSCATHETER AORTIC VALVE REPLACEMENT FEMORAL APPROACH performed by Nitin Schumacher MD at [address] Right 7/2/2019    PERITONEAL DIALYSIS CATHETER REMOVAL performed by Nitin Schumacher Kingsley Gonzalez MD at [address] [place] [address]  2/29/2015    WNL    CORONARY ANGIOPLASTY WITH STENT PLACEMENT  05/26/15    CYST REMOVAL  08/14/2013    EXCISION CYSTS, NECK X2 AND ABDOMINAL benign    DIAGNOSTIC CARDIAC CATH LAB PROCEDURE      DIALYSIS FISTULA CREATION Left 10/30/2017    LEFT BRACHIAL CEPHALIC FISTULA    DIALYSIS FISTULA CREATION Left 3/27/2019    LIGATION  AV FISTULA performed by Lela Mitchell MD at [facility], COLON, DIAGNOSTIC      OTHER SURGICAL HISTORY  02/01/2017    laparoscopic cholecystectomy with intraoperative cholangiogram    OTHER SURGICAL HISTORY  2018    PORT PLACEMENT  - vas cath    OTHER SURGICAL HISTORY Bilateral 06/26/2018    laprascopic peritoneal dialysis catheter placement    OTHER SURGICAL HISTORY Right 09/2018    peritoneal dialysis port placed on right side of abdomen    OTHER SURGICAL HISTORY  05/28/2019    PTA/Stenting R External Iliac artery    [place] LAP INSERTION TUNNELED INTRAPERITONEAL CATHETER N/A 9/21/2018    LAPAROSCOPIC PERITONEAL DIALYSIS CATHETER REPLACEMENT performed by Neptali Boston MD at [address] [place] [address]  01/06/2016    UPPER GASTROINTESTINAL ENDOSCOPY  01/29/2017    possible candida, otherwise normal appearing    VASCULAR SURGERY  aprx 2 years ago    2 stents placed, each side of groin       CURRENT MEDICATIONS    Current Outpatient Rx   Medication Sig Dispense Refill    hydrALAZINE (APRESOLINE) 50 MG tablet Take 0.5 tablets by mouth every 8 hours 90 tablet 0    losartan (COZAAR) 50 MG tablet TAKE (1) TABLET BY MOUTH DAILY 90 tablet 10    pantoprazole (PROTONIX) 40 MG tablet TAKE (1) TABLET BY MOUTH EACH MORNING BEFORE BREAKFAST 90 tablet 10    albuterol (PROVENTIL) (2.5 MG/3ML) 0.083% nebulizer solution INHALE 1 VIAL VIA NEBULIZER EVERY 6 HOURS AS NEEDED FOR WHEEZING 300 mL 10    nystatin (MYCOSTATIN) 618208 UNIT/GM cream Apply topically 2 times daily.  60 g 3    FAMILY HISTORY    Family History   Problem Relation Age of Onset    Diabetes Mother     Heart Disease Father     Kidney Disease Sister         stage 4-kidney failure    Cancer Sister     Heart Disease Sister     Obesity Sister     Cancer Sister     Heart Disease Sister     Obesity Sister     Alcohol Abuse Brother        SOCIAL HISTORY    Social History     Socioeconomic History    Marital status:      Spouse name: None    Number of children: None    Years of education: None    Highest education level: None   Occupational History    None   Social Needs    Financial resource strain: None    Food insecurity     Worry: None     Inability: None    Transportation needs     Medical: None     Non-medical: None   Tobacco Use    Smoking status: Current Some Day Smoker     Packs/day: 0.10     Years: 33.00     Pack years: 3.30     Types: Cigarettes    Smokeless tobacco: Never Used    Tobacco comment: TRYING TO QUIT 3-7 a day   Substance and Sexual Activity    Alcohol use: Not Currently     Alcohol/week: 0.0 standard drinks     Comment: occ    Drug use: No    Sexual activity: Yes     Partners: Female     Comment:    Lifestyle    Physical activity     Days per week: None     Minutes per session: None    Stress: None   Relationships    Social connections     Talks on phone: None     Gets together: None     Attends Faith service: None     Active member of club or organization: None     Attends meetings of clubs or organizations: None     Relationship status: None    Intimate partner violence     Fear of current or ex partner: None     Emotionally abused: None     Physically abused: None     Forced sexual activity: None   Other Topics Concern    None   Social History Narrative    None       REVIEW OF SYSTEMS    10 systems reviewed, pertinent positives per HPI otherwise noted to be negative    PHYSICAL EXAM  Vitals:    03/10/20 1436   BP: 123/67   Pulse: 52   Resp: 16   Temp: testing reviewed and results discussed. I have evaluated this patient. My supervising physician was available for consultation. Tesha Thakur presented to the ED today with above noted complaints. Physical exam without adventitious breath sounds. Patient is well saturated on room air. He does report symptoms consistent with lower extremity claudication. Left lower extremity is distally neurovascularly intact. Pulses fleeting upon palpation, I was able to obtain Doppler pulses, monophasic on the right, biphasic on the left. Bilateral lower extremities are equal in color and temperature. Capillary refill brisk in both lower extremities. Patient did report dizziness and orthostatic blood pressures obtained and negative. CBC shows leukocytosis is WBC elevated at 15.5, absolute neutrophils elevated at 12. 6. No further differential shift. Stable anemia. CMP does show hyponatremia but this is consistent with patient's prior lab results. BUN and creatinine elevated at 46/4 this is expected given his history of end-stage renal disease and hemodialysis. He is due for treatment tomorrow. No liver dysfunction. EKG sinus bradycardia with first-degree AV block. Tropon elevated at 0.04. Patient has had prior elevations in the past and this is likely due to his chronic kidney disease. CXR without acute findings. UA without evidence of infection. Trace ketones. Patient's blood pressure did elevate without intervention here in the ED. I did consult cardiology and spoke with Dr. Tenzin Velasquez regarding the patient as he was sent from that office. Dr. Tenzin Velasquez stated that after he had seen the patient as he was leaving he became dizzy and blood pressure was noted to be 35J systolic. He had already recommended to stop his Imdur, he stated to me to have the patient decrease his hydralazine in half as he is apparently needing Middaugh drain after his dialysis runs.   He stated to have the patient follow-up with nephrology tomorrow at dialysis for further direction on blood pressure medicine and control. I did discuss this with the patient and specifically told him that he needs to cut the hydralazine dose in half and to discuss his blood pressure issues with his nephrologist.  If he cannot do it tomorrow at dialysis then to call the office for follow-up. In regards to his multiple other presenting complaints I advised him to follow-up with his primary care provider. Patient has a scheduled appointment with cardiology on 3/2 6. At this point I do not feel the patient requires further work up and it is reasonable to discharge the patient. Please refer to AVS for further details regarding discharge instructions. A discussion was had with the patient regarding diagnosis, diagnostic testing results, treatment/ plan of care, and follow up. All questions were answered. Patient will follow up as directed for further evaluation/treatment. The patient was given strict return precautions as we discussed symptoms that would necessitate return to the ED. Patient will return to ED for new/worsening symptoms. The patient verbalized their understanding and agreement with the above plan. I estimate there is LOW risk for ACUTE CORONARY SYNDROME, INTRACRANIAL HEMORRHAGE, MALIGNANT DYSRHYTHMIA or HYPERTENSION, PULMONARY EMBOLISM, SEPSIS, SUBARACHNOID HEMORRHAGE, SUBDURAL HEMATOMA, STROKE, or THORACIC AORTIC DISSECTION, thus I consider the discharge disposition reasonable. Cisco Lopes and I have discussed the diagnosis and risks, and we agree with discharging home to follow-up with their primary doctor. We also discussed returning to the Emergency Department immediately if new or worsening symptoms occur. We have discussed the symptoms which are most concerning (e.g., bloody sputum, fever, worsening pain or shortness of breath, vomiting, weakness) that necessitate immediate return.      Patient was sent home with a prescription for below medication/s. I did [place] patient on appropriate use of these medication. Discharge Medication List as of 3/10/2020  3:00 PM          CLINICAL IMPRESSION    1. Hypotension, unspecified hypotension type    2. Claudication ([address]    3. Dizziness    4. Chest tightness    5. Chronic pain syndrome           Discharge Vitals:  Blood pressure 123/67, pulse 52, temperature 98.1 °F (36.7 °C), temperature source Oral, resp. rate 16, height 5' 9\" (1.753 m), weight 250 lb (113.4 kg), SpO2 97 %. FOLLOW UP  Jasmin Hamilton MD  [address] [facility]. [facility] [address]  [phone]    Call in 1 day  For further evaluation    Sav Gerard MD  [address] [place]  [phone]    Call in 1 day  For further evaluation-schedule an appointment for leg pain    Tim Oliver MD  [place] [address]  [phone]    Call in 1 day  For further evaluation    Moose Castillo MD  [address] [place] [id number] [phone]    Go on 3/26/2020  For further evaluation    [facility] [address] [phone]  Go to   If symptoms worsen      DISPOSITION  Patient was discharged to home in good condition. Comment: Please note this report has been produced using speech recognition software and may contain errors related to that system including errors in grammar, punctuation, and spelling, as well as words and phrases that may be inappropriate. If there are any questions or concerns please feel free to contact the dictating provider for clarification.        JAY Costa - ILAN  03/10/20 2045

## 2020-03-16 ENCOUNTER — TELEPHONE (OUTPATIENT)
Dept: FAMILY MEDICINE CLINIC | Age: 50
End: 2020-03-16

## 2020-03-16 RX ORDER — FLUCONAZOLE 100 MG/1
100 TABLET ORAL DAILY
Qty: 7 TABLET | Refills: 0 | Status: SHIPPED | OUTPATIENT
Start: 2020-03-16 | End: 2020-03-23

## 2020-03-16 NOTE — TELEPHONE ENCOUNTER
Did he also take the diflucan tablet? I can send in a longer course of the diflucan tablets to see if that is beneficial. This is common in diabetes.

## 2020-03-25 PROBLEM — Z72.0 TOBACCO ABUSE: Status: RESOLVED | Noted: 2020-03-25 | Resolved: 2020-03-24

## 2020-04-03 RX ORDER — DULOXETIN HYDROCHLORIDE 30 MG/1
CAPSULE, DELAYED RELEASE ORAL
Qty: 90 CAPSULE | Refills: 10 | Status: ON HOLD | OUTPATIENT
Start: 2020-04-03 | End: 2021-04-27

## 2020-04-10 ENCOUNTER — HOSPITAL ENCOUNTER (EMERGENCY)
Age: 50
Discharge: HOME | End: 2020-04-10
Attending: EMERGENCY MEDICINE
Payer: COMMERCIAL

## 2020-04-10 ENCOUNTER — APPOINTMENT (OUTPATIENT)
Dept: GENERAL RADIOLOGY | Age: 50
End: 2020-04-10
Payer: COMMERCIAL

## 2020-04-10 VITALS
HEIGHT: 68 IN | OXYGEN SATURATION: 99 % | SYSTOLIC BLOOD PRESSURE: 154 MMHG | RESPIRATION RATE: 14 BRPM | DIASTOLIC BLOOD PRESSURE: 87 MMHG | WEIGHT: 251 LBS | HEART RATE: 80 BPM | TEMPERATURE: 98 F | BODY MASS INDEX: 38.04 KG/M2

## 2020-04-10 LAB
A/G RATIO: 1.2 (ref 1.1–2.2)
ALBUMIN SERPL-MCNC: 4.2 G/DL (ref 3.4–5)
ALP BLD-CCNC: 119 U/L (ref 40–129)
ALT SERPL-CCNC: 13 U/L (ref 10–40)
ANION GAP SERPL CALCULATED.3IONS-SCNC: 13 MMOL/L (ref 3–16)
AST SERPL-CCNC: 13 U/L (ref 15–37)
BASOPHILS ABSOLUTE: 0.1 K/UL (ref 0–0.2)
BASOPHILS RELATIVE PERCENT: 0.7 %
BILIRUB SERPL-MCNC: <0.2 MG/DL (ref 0–1)
BUN BLDV-MCNC: 39 MG/DL (ref 7–20)
CALCIUM SERPL-MCNC: 9.5 MG/DL (ref 8.3–10.6)
CHLORIDE BLD-SCNC: 90 MMOL/L (ref 99–110)
CO2: 26 MMOL/L (ref 21–32)
CREAT SERPL-MCNC: 3.4 MG/DL (ref 0.9–1.3)
EKG ATRIAL RATE: 82 BPM
EKG DIAGNOSIS: NORMAL
EKG P AXIS: 32 DEGREES
EKG P-R INTERVAL: 168 MS
EKG Q-T INTERVAL: 384 MS
EKG QRS DURATION: 84 MS
EKG QTC CALCULATION (BAZETT): 448 MS
EKG R AXIS: 33 DEGREES
EKG T AXIS: 86 DEGREES
EKG VENTRICULAR RATE: 82 BPM
EOSINOPHILS ABSOLUTE: 0.2 K/UL (ref 0–0.6)
EOSINOPHILS RELATIVE PERCENT: 2.1 %
GFR AFRICAN AMERICAN: 23
GFR NON-AFRICAN AMERICAN: 19
GLOBULIN: 3.6 G/DL
GLUCOSE BLD-MCNC: 344 MG/DL (ref 70–99)
GLUCOSE BLD-MCNC: 358 MG/DL (ref 70–99)
HCT VFR BLD CALC: 36.5 % (ref 40.5–52.5)
HEMOGLOBIN: 12.3 G/DL (ref 13.5–17.5)
LACTIC ACID: 2 MMOL/L (ref 0.4–2)
LYMPHOCYTES ABSOLUTE: 0.9 K/UL (ref 1–5.1)
LYMPHOCYTES RELATIVE PERCENT: 8.2 %
MCH RBC QN AUTO: 31 PG (ref 26–34)
MCHC RBC AUTO-ENTMCNC: 33.6 G/DL (ref 31–36)
MCV RBC AUTO: 92.4 FL (ref 80–100)
MONOCYTES ABSOLUTE: 0.5 K/UL (ref 0–1.3)
MONOCYTES RELATIVE PERCENT: 4.5 %
NEUTROPHILS ABSOLUTE: 9 K/UL (ref 1.7–7.7)
NEUTROPHILS RELATIVE PERCENT: 84.5 %
PDW BLD-RTO: 14.4 % (ref 12.4–15.4)
PERFORMED ON: ABNORMAL
PLATELET # BLD: 268 K/UL (ref 135–450)
PMV BLD AUTO: 8.2 FL (ref 5–10.5)
POTASSIUM SERPL-SCNC: 4.6 MMOL/L (ref 3.5–5.1)
PRO-BNP: 3866 PG/ML (ref 0–124)
RAPID INFLUENZA  B AGN: NEGATIVE
RAPID INFLUENZA A AGN: NEGATIVE
RBC # BLD: 3.96 M/UL (ref 4.2–5.9)
SODIUM BLD-SCNC: 129 MMOL/L (ref 136–145)
SPECIMEN STATUS: NORMAL
TOTAL PROTEIN: 7.8 G/DL (ref 6.4–8.2)
TROPONIN: 0.04 NG/ML
WBC # BLD: 10.7 K/UL (ref 4–11)

## 2020-04-10 PROCEDURE — 85025 COMPLETE CBC W/AUTO DIFF WBC: CPT

## 2020-04-10 PROCEDURE — 99285 EMERGENCY DEPT VISIT HI MDM: CPT

## 2020-04-10 PROCEDURE — 93005 ELECTROCARDIOGRAM TRACING: CPT

## 2020-04-10 PROCEDURE — 87804 INFLUENZA ASSAY W/OPTIC: CPT

## 2020-04-10 PROCEDURE — 83880 ASSAY OF NATRIURETIC PEPTIDE: CPT

## 2020-04-10 PROCEDURE — 6360000002 HC RX W HCPCS: Performed by: EMERGENCY MEDICINE

## 2020-04-10 PROCEDURE — 84484 ASSAY OF TROPONIN QUANT: CPT

## 2020-04-10 PROCEDURE — 93010 ELECTROCARDIOGRAM REPORT: CPT | Performed by: INTERNAL MEDICINE

## 2020-04-10 PROCEDURE — 83605 ASSAY OF LACTIC ACID: CPT

## 2020-04-10 PROCEDURE — 71045 X-RAY EXAM CHEST 1 VIEW: CPT

## 2020-04-10 PROCEDURE — 94640 AIRWAY INHALATION TREATMENT: CPT

## 2020-04-10 PROCEDURE — 80053 COMPREHEN METABOLIC PANEL: CPT

## 2020-04-10 PROCEDURE — 96374 THER/PROPH/DIAG INJ IV PUSH: CPT

## 2020-04-10 PROCEDURE — 6370000000 HC RX 637 (ALT 250 FOR IP): Performed by: EMERGENCY MEDICINE

## 2020-04-10 RX ORDER — IPRATROPIUM BROMIDE AND ALBUTEROL SULFATE 2.5; .5 MG/3ML; MG/3ML
1 SOLUTION RESPIRATORY (INHALATION)
Status: DISCONTINUED | OUTPATIENT
Start: 2020-04-10 | End: 2020-04-10

## 2020-04-10 RX ORDER — PREDNISONE 20 MG/1
40 TABLET ORAL DAILY
Qty: 10 TABLET | Refills: 0 | Status: SHIPPED | OUTPATIENT
Start: 2020-04-10 | End: 2020-04-15

## 2020-04-10 RX ORDER — IPRATROPIUM BROMIDE AND ALBUTEROL SULFATE 2.5; .5 MG/3ML; MG/3ML
1 SOLUTION RESPIRATORY (INHALATION) ONCE
Status: COMPLETED | OUTPATIENT
Start: 2020-04-10 | End: 2020-04-10

## 2020-04-10 RX ORDER — METHYLPREDNISOLONE SODIUM SUCCINATE 125 MG/2ML
60 INJECTION, POWDER, LYOPHILIZED, FOR SOLUTION INTRAMUSCULAR; INTRAVENOUS ONCE
Status: COMPLETED | OUTPATIENT
Start: 2020-04-10 | End: 2020-04-10

## 2020-04-10 RX ADMIN — IPRATROPIUM BROMIDE AND ALBUTEROL SULFATE 1 AMPULE: .5; 3 SOLUTION RESPIRATORY (INHALATION) at 16:30

## 2020-04-10 RX ADMIN — METHYLPREDNISOLONE SODIUM SUCCINATE 60 MG: 125 INJECTION, POWDER, FOR SOLUTION INTRAMUSCULAR; INTRAVENOUS at 15:24

## 2020-04-10 ASSESSMENT — ENCOUNTER SYMPTOMS
DIARRHEA: 0
NAUSEA: 1
SORE THROAT: 0
ABDOMINAL PAIN: 0
COUGH: 1
VOMITING: 0
SHORTNESS OF BREATH: 1
BACK PAIN: 0
CONSTIPATION: 0

## 2020-04-10 NOTE — ED PROVIDER NOTES
diseases of blood and blood-forming organs          SURGICAL HISTORY       Past Surgical History:   Procedure Laterality Date    AORTIC VALVE REPLACEMENT N/A 10/15/2019    TRANSCATHETER AORTIC VALVE REPLACEMENT FEMORAL APPROACH performed by Donold Osgood, MD at [address] [place] [address] Ave Right 7/2/2019    PERITONEAL DIALYSIS CATHETER REMOVAL performed by Rodrigo Holt MD at [facility] COLONOSCOPY  2/29/2015    WN    CORONARY ANGIOPLASTY WITH STENT PLACEMENT  05/26/15    CYST REMOVAL  08/14/2013    EXCISION CYSTS, NECK X2 AND ABDOMINAL benign    DIAGNOSTIC CARDIAC CATH LAB PROCEDURE      DIALYSIS FISTULA CREATION Left 10/30/2017    LEFT BRACHIAL CEPHALIC FISTULA    DIALYSIS FISTULA CREATION Left 3/27/2019    LIGATION  AV FISTULA performed by Gwen Gonzales MD at [facility]. [address], COLON, DIAGNOSTIC      OTHER SURGICAL HISTORY  02/01/2017    laparoscopic cholecystectomy with intraoperative cholangiogram    OTHER SURGICAL HISTORY  2018    PORT PLACEMENT  - vas cath    OTHER SURGICAL HISTORY Bilateral 06/26/2018    laprascopic peritoneal dialysis catheter placement    OTHER SURGICAL HISTORY Right 09/2018    peritoneal dialysis port placed on right side of abdomen    OTHER SURGICAL HISTORY  05/28/2019    PTA/Stenting R External Iliac artery    IL LAP INSERTION TUNNELED INTRAPERITONEAL CATHETER N/A 9/21/2018    LAPAROSCOPIC PERITONEAL DIALYSIS CATHETER REPLACEMENT performed by Rodrigo Holt MD at [address]  01/06/2016    UPPER GASTROINTESTINAL ENDOSCOPY  01/29/2017    possible candida, otherwise normal appearing    VASCULAR SURGERY  aprx 2 years ago    2 stents placed, each side of groin         CURRENT MEDICATIONS       Discharge Medication List as of 4/10/2020  6:13 PM      CONTINUE these medications which have NOT CHANGED    Details   DULoxetine (CYMBALTA) 30 MG extended release capsule TAKE 1 CAPSULE BY MOUTH EVERY DAY, Disp-90 capsule, R-10Normal      nystatin-triamcinolone (MYCOLOG II) 360786-0.1 UNIT/GM-% cream Apply topically 2 times daily. , Disp-60 g, R-2, Normal      losartan (COZAAR) 50 MG tablet TAKE (1) TABLET BY MOUTH DAILY, Disp-90 tablet, R-10Normal      pantoprazole (PROTONIX) 40 MG tablet TAKE (1) TABLET BY MOUTH EACH MORNING BEFORE BREAKFAST, Disp-90 tablet, R-10Normal      albuterol (PROVENTIL) (2.5 MG/3ML) 0.083% nebulizer solution INHALE 1 VIAL VIA NEBULIZER EVERY 6 HOURS AS NEEDED FOR WHEEZING, Disp-300 mL, R-10Normal      hydrALAZINE (APRESOLINE) 50 MG tablet Take 0.5 tablets by mouth every 8 hours, Disp-90 tablet, R-0Print      nystatin (MYCOSTATIN) 754951 UNIT/GM cream Apply topically 2 times daily. , Disp-60 g, R-3, Normal      insulin glargine (LANTUS) 100 UNIT/ML injection vial Inject 30 Units into the skin nightly, Disp-6 vial, R-1Normal      insulin lispro (HUMALOG) 100 UNIT/ML injection vial Inject 10 Units into the skin 3 times daily (before meals), Disp-1 vial, R-5Normal      Insulin Syringe-Needle U-100 30G X 1/2\" 0.5 ML MISC DAILY Starting Wed 3/4/2020, Disp-100 each, R-3, Normal      insulin glargine (BASAGLAR KWIKPEN) 100 UNIT/ML injection pen Inject 30 Units into the skin nightly, Disp-15 pen, R-1Normal      insulin aspart (NOVOLOG FLEXPEN) 100 UNIT/ML injection pen Inject 10 Units into the skin 3 times daily (before meals), Disp-5 pen, R-3Normal      QUEtiapine (SEROQUEL) 25 MG tablet TAKE 1 TABLET BY MOUTH EVERY EVENING, Disp-30 tablet, R-5Normal      nitroGLYCERIN (NITROSTAT) 0.4 MG SL tablet DISSOLVE 1 TABLET UNDER THE TONGUE AS NEEDED FOR CHEST PAIN EVERY 5 MINUTES UP TO 3 TIMES.  IF NO RELIEF CALL 911., Disp-25 tablet, R-10Normal      sennosides-docusate sodium (SENOKOT-S) 8.6-50 MG tablet Take 1 tablet by mouth daily, Disp-10 tablet, R-0Normal      pravastatin (PRAVACHOL) 80 MG tablet TAKE 1 TABLET BY MOUTH ONCE DAILY, Disp-90 tablet, R-3Normal      clopidogrel sprays into the lungs every 12 hours, Disp-1 Bottle, R-5Normal      Tiotropium Bromide-Olodaterol (STIOLTO RESPIMAT) 2.5-2.5 MCG/ACT AERS Inhale 2 puffs into the lungs daily, Disp-2 Inhaler, R-02 samples given:  Lot #[id number], Exp 7/21 & Lot #[id number], Exp 7/21NO PRINT      oxyCODONE-acetaminophen (PERCOCET) 7.5-325 MG per tablet Take 1 tablet by mouth every 4 hours as needed for Pain. Vernestine Ket Historical Med      Polyethylene Glycol 3350 GRAN Starting Wed 5/2/2018, Historical Med      !! Glucose Blood (BLOOD GLUCOSE TEST STRIPS) STRP TEST 3-4 TIMES DAILY, AS DIRECTED, Disp-100 strip, R-3Normal      Blood Glucose Monitoring Suppl ADAM Disp-1 Device, R-0, NormalUSE AS DIRECTED. Alcohol Swabs PADS Disp-300 each, R-3, NormalUSE AS DIRECTED      albuterol sulfate  (90 Base) MCG/ACT inhaler Inhale 2 puffs into the lungs every 6 hours as needed for Wheezing, Disp-1 Inhaler, R-3Print      ipratropium-albuterol (DUONEB) 0.5-2.5 (3) MG/3ML SOLN nebulizer solution Inhale 3 mLs into the lungs every 6 hours as needed for Shortness of Breath, Disp-360 mL, R-1Print      !! Lancets MISC Disp-100 each, R-3, PrintTest daily      calcium carbonate (TUMS) 500 MG chewable tablet Take 1 tablet by mouth 3 times daily as needed for Heartburn. aspirin 81 MG chewable tablet Take 1 tablet by mouth daily. , Disp-30 tablet, R-2       !! - Potential duplicate medications found. Please discuss with provider.           ALLERGIES     Morphine    FAMILY HISTORY       Family History   Problem Relation Age of Onset    Diabetes Mother     Heart Disease Father     Kidney Disease Sister         stage 4-kidney failure    Cancer Sister     Heart Disease Sister     Obesity Sister     Cancer Sister     Heart Disease Sister     Obesity Sister     Alcohol Abuse Brother           SOCIAL HISTORY       Social History     Socioeconomic History    Marital status:      Spouse name: None    Number of children: None    Years of education: None  Highest education level: None   Occupational History    None   Social Needs    Financial resource strain: None    Food insecurity     Worry: None     Inability: None    Transportation needs     Medical: None     Non-medical: None   Tobacco Use    Smoking status: Current Some Day Smoker     Packs/day: 0.10     Years: 33.00     Pack years: 3.30     Types: Cigarettes    Smokeless tobacco: Never Used    Tobacco comment: TRYING TO QUIT 3-7 a day   Substance and Sexual Activity    Alcohol use: Not Currently     Alcohol/week: 0.0 standard drinks     Comment: occ    Drug use: No    Sexual activity: Yes     Partners: Female     Comment:    Lifestyle    Physical activity     Days per week: None     Minutes per session: None    Stress: None   Relationships    Social connections     Talks on phone: None     Gets together: None     Attends Anglican service: None     Active member of club or organization: None     Attends meetings of clubs or organizations: None     Relationship status: None    Intimate partner violence     Fear of current or ex partner: None     Emotionally abused: None     Physically abused: None     Forced sexual activity: None   Other Topics Concern    None   Social History Narrative    None       SCREENINGS               PHYSICAL EXAM    (up to 7 for level 4, 8 or more for level 5)     ED Triage Vitals   BP Temp Temp src Pulse Resp SpO2 Height Weight   -- -- -- -- -- -- -- --       Physical Exam  Vitals signs and nursing note reviewed. Constitutional:       General: He is not in acute distress. Appearance: Normal appearance. HENT:      Head: Normocephalic and atraumatic. Nose: Nose normal. No congestion. Mouth/Throat:      Mouth: Mucous membranes are moist.   Eyes:      Conjunctiva/sclera: Conjunctivae normal.   Neck:      Musculoskeletal: Normal range of motion and neck supple. Cardiovascular:      Rate and Rhythm: Normal rate and regular rhythm.       Pulses: Normal pulses. Heart sounds: Normal heart sounds. Pulmonary:      Effort: Pulmonary effort is normal.      Breath sounds: Decreased air movement present. Wheezing present. Abdominal:      General: There is no distension. Palpations: Abdomen is soft. Tenderness: There is no abdominal tenderness. Musculoskeletal: Normal range of motion. General: No swelling or deformity. Skin:     General: Skin is warm and dry. Neurological:      General: No focal deficit present. Mental Status: He is alert and oriented to person, place, and time. DIAGNOSTIC RESULTS     EKG: All EKG's are interpreted by the Emergency Department Physician who either signs or Co-signs this chart in the absence of a cardiologist.    Sinus rhythm, rate 82, normal intervals, no STEMI    RADIOLOGY:     Interpretation per the Radiologist below, if available at the time of this note:    XR CHEST PORTABLE   Final Result   No acute cardiac or pulmonary disease.                LABS:  Labs Reviewed   CBC WITH AUTO DIFFERENTIAL - Abnormal; Notable for the following components:       Result Value    RBC 3.96 (*)     Hemoglobin 12.3 (*)     Hematocrit 36.5 (*)     Neutrophils Absolute 9.0 (*)     Lymphocytes Absolute 0.9 (*)     All other components within normal limits    Narrative:     Performed at:  [place] [address] [facility]   Phone [phone]   COMPREHENSIVE METABOLIC PANEL - Abnormal; Notable for the following components:    Sodium 129 (*)     Chloride 90 (*)     Glucose 358 (*)     BUN 39 (*)     CREATININE 3.4 (*)     GFR Non- 19 (*)     GFR  23 (*)     AST 13 (*)     All other components within normal limits    Narrative:     Performed at:  [facility]) [place] [address] [facility]   Phone [phone]   TROPONIN - Abnormal; Notable for the following components:    Troponin 0.04 expiratory wheezes. Differential includes COPD exacerbation, CHF exacerbation, vasovagal episode, electrolyte abnormality. Lab work-up notable for white blood cell count within normal limits, hyponatremia but similar to previous, elevated BNP similar to previous, flu negative, troponin elevated similar to previous. Chest x-ray without acute cardiopulmonary abnormality. Patient is given dose of IV steroids and DuoNeb treatment. On reevaluation patient feels significantly improved and lung exam is also significantly improved. Patient has maintained a normal blood pressure while in the emergency department. He believes that he finished all but an hour of his current dialysis treatment and will be fine until he follows up on Monday for his next treatment. We will send him on a short course of steroids and encouraged him to do nebulizer treatments at home. Patient is amenable with this plan. Patient discharged home. Return precautions explained. CONSULTS:  None    PROCEDURES:  Unless otherwise noted below, none     Procedures      FINAL IMPRESSION      1. COPD exacerbation ([address]    2. Shortness of breath          DISPOSITION/PLAN   DISPOSITION        PATIENT REFERRED TO:  Armin Ferrera MD  [address] [place] [address]. [address] [place] [address]  [phone]    Schedule an appointment as soon as possible for a visit         DISCHARGE MEDICATIONS:  Discharge Medication List as of 4/10/2020  6:13 PM      START taking these medications    Details   predniSONE (DELTASONE) 20 MG tablet Take 2 tablets by mouth daily for 5 days, Disp-10 tablet, R-0Print           Controlled Substances Monitoring:     RX Monitoring 8/4/2017   Attestation The Prescription Monitoring Report for this patient was reviewed today. Periodic Controlled Substance Monitoring No signs of potential drug abuse or diversion identified.        (Please note that portions of this note were completed with a voice recognition program.  Efforts were made to edit

## 2020-04-10 NOTE — PROGRESS NOTES
04/10/20 1631   Treatment   Treatment Type N   $Treatment Type $Inhaled Therapy/Meds   Medications Albuterol/Ipratropium   Pre-Tx Pulse 78   Pre-Tx Resps 14   Breath Sounds Pre-Tx PRAKASH Diminished   Breath Sounds Pre-Tx LLL Diminished   Breath Sounds Pre-Tx RUL Diminished   Breath Sounds Pre-Tx RML Diminished   Breath Sounds Pre-Tx RLL Diminished   Breath Sounds Post-Tx PRAKASH Diminished   Breath Sounds Post-Tx LLL Diminished   Breath Sounds Post-Tx RUL Diminished   Breath Sounds Post-Tx RML Diminished   Breath Sounds Post-Tx RLL Diminished   Post-Tx Pulse 78   Post-Tx Resps 14   Delivery Source Oxygen   Position Sitting   Tx Tolerance Well   Is patient on O2?  N   Oxygen Therapy/Pulse Ox   O2 Therapy Room air   O2 Device None (Room air)   Resp 14   SpO2 98 %

## 2020-04-11 ENCOUNTER — CARE COORDINATION (OUTPATIENT)
Dept: CARE COORDINATION | Age: 50
End: 2020-04-11

## 2020-04-13 ENCOUNTER — CARE COORDINATION (OUTPATIENT)
Dept: CARE COORDINATION | Age: 50
End: 2020-04-13

## 2020-04-13 NOTE — CARE COORDINATION
ACM completed ED follow up call. Patient was able to go to dialysis today and tolerating. No new flu-like symptoms. He has a cough and shortness of breath but considers that his baseline. ED visit on 4/10 for COPD exacerbation. Using a steroid taper currently. COVID-19 Screening Initial Follow-up Note    Patient contacted regarding Keo Hammonds. Care Transition Nurse/ Ambulatory Care Manager contacted the patient by telephone to perform post discharge assessment. Verified name and  with patient as identifiers. Provided introduction to self, and explanation of the CTN/ACM role, and reason for call due to risk factors for infection and/or exposure to COVID-19. Symptoms reviewed with patient who verbalized the following symptoms: fatigue, pain or aching joints, cough, shortness of breath and no new/worsening symptoms       Due to no new or worsening symptoms encounter was not routed to provider for escalation. Patient has following risk factors of: COPD, dialysis patient. CTN/ACM reviewed discharge instructions, medical action plan and red flags such as increased shortness of breath, increasing fever and signs of decompensation with patient who verbalized understanding. Discussed exposure protocols and quarantine with CDC Guidelines What to do if you are sick with coronavirus disease 2019.  Patient was given an opportunity for questions and concerns. The patient agrees to contact the [employer] exposure line [phone], local [facility] department [place] Department [facility]: ([phone]) and PCP office for questions related to their healthcare. CTN/ACM provided contact information for future reference.     Reviewed and educated patient on any new and changed medications related to discharge diagnosis     Patient/family/caregiver given information for [employer] Loop and agrees to enroll no  Patient's preferred e-mail: does not have one    Patient's preferred phone number: see chart  Based on Loop alert triggers, patient will be contacted by nurse care manager for worsening symptoms. Plan for follow-up call in 5-7 days based on severity of symptoms and risk factors.     Plan      Follow up call in 1 week

## 2020-04-17 ENCOUNTER — CARE COORDINATION (OUTPATIENT)
Dept: CARE COORDINATION | Age: 50
End: 2020-04-17

## 2020-04-17 NOTE — CARE COORDINATION
which is about two arm lengths) with people who are sick.  Put distance between yourself and other people if COVID-19 is spreading in your community.  Clean and disinfect frequently touched surfaces.  Avoid all cruise travel and non-essential air travel.  Call your healthcare professional if you have concerns about COVID-19 and your underlying condition or if you are sick. For more information on steps you can take to protect yourself, see CDC's How to [address] [place] [address] for follow-up call in 1-2 days based on severity of symptoms and risk factors. Scheduled virtual visit   Escalated to provider.

## 2020-04-21 ENCOUNTER — VIRTUAL VISIT (OUTPATIENT)
Dept: FAMILY MEDICINE CLINIC | Age: 50
End: 2020-04-21
Payer: COMMERCIAL

## 2020-04-21 PROCEDURE — 3017F COLORECTAL CA SCREEN DOC REV: CPT | Performed by: FAMILY MEDICINE

## 2020-04-21 PROCEDURE — 99214 OFFICE O/P EST MOD 30 MIN: CPT | Performed by: FAMILY MEDICINE

## 2020-04-21 PROCEDURE — G8427 DOCREV CUR MEDS BY ELIG CLIN: HCPCS | Performed by: FAMILY MEDICINE

## 2020-04-21 RX ORDER — QUETIAPINE FUMARATE 50 MG/1
50 TABLET, FILM COATED ORAL EVERY EVENING
Qty: 30 TABLET | Refills: 5 | Status: SHIPPED | OUTPATIENT
Start: 2020-04-21 | End: 2020-09-28

## 2020-04-21 RX ORDER — NEPHROCAP 1 MG
CAP ORAL
Qty: 90 CAPSULE | Refills: 1 | Status: SHIPPED | OUTPATIENT
Start: 2020-04-21 | End: 2020-11-18 | Stop reason: SDUPTHER

## 2020-04-21 RX ORDER — PREDNISONE 10 MG/1
TABLET ORAL
Qty: 54 TABLET | Refills: 0 | Status: ON HOLD | OUTPATIENT
Start: 2020-04-21 | End: 2020-04-28 | Stop reason: HOSPADM

## 2020-04-21 NOTE — PROGRESS NOTES
meals)  Eric Read MD   QUEtiapine (SEROQUEL) 25 MG tablet TAKE 1 TABLET BY MOUTH EVERY EVENING  Eric Read MD   nitroGLYCERIN (NITROSTAT) 0.4 MG SL tablet DISSOLVE 1 TABLET UNDER THE TONGUE AS NEEDED FOR CHEST PAIN EVERY 5 MINUTES UP TO 3 TIMES. IF NO RELIEF CALL 911. Eric Read MD   sennosides-docusate sodium (SENOKOT-S) 8.6-50 MG tablet Take 1 tablet by mouth daily  Evaristo Zhang MD   pravastatin (PRAVACHOL) 80 MG tablet TAKE 1 TABLET BY MOUTH ONCE DAILY  Noni Carson MD   clopidogrel (PLAVIX) 75 MG tablet TAKE 1 TABLET BY MOUTH ONCE DAILY  Noni Carson MD   metoprolol succinate (TOPROL XL) 25 MG extended release tablet Take 1 tablet by mouth daily  Binh Damian MD   hydrOXYzine (VISTARIL) 50 MG capsule TAKE 1 TO 2 CAPSULES BY MOUTH NIGHTLY  Eric Read MD   fluticasone-umeclidin-vilant (TRELEGY ELLIPTA) 100-62.5-25 MCG/INH AEPB Inhale 1 puff into the lungs daily  Natali Anthony MD   gabapentin (NEURONTIN) 100 MG capsule TAKE 1 TO 2 CAPSULES BY MOUTH THREE TIMES A DAY  Eric Read MD   ondansetron (ZOFRAN ODT) 4 MG disintegrating tablet Take 1 tablet by mouth every 8 hours as needed for Nausea  JASE Whittington   calcium acetate (PHOSLO) 667 MG capsule Take 1 capsule by mouth 3 times daily (with meals)  JAY Bernstein - CNP   traZODone (DESYREL) 150 MG tablet TAKE (1) TABLET BY MOUTH NIGHTLY  Eric Read MD   citalopram (CELEXA) 40 MG tablet TAKE (1) TABLET BY MOUTH DAILY  Eric Read MD   umeclidinium-vilanterol (ANORO ELLIPTA) 62.5-25 MCG/INH AEPB inhaler Inhale 1 puff into the lungs daily  Natali Anthony MD   ACCU-CHEK FASTCLIX LANCETS MISC TEST 3-4 TIMES DAILY, AS DIRECTED  Eric Read MD   blood glucose test strips (FREESTYLE LITE) strip Daily As needed.   Eric Read MD   glucose monitoring kit (FREESTYLE) monitoring kit 1 kit by Does not apply route daily  Eric Read MD   vitamin D (ERGOCALCIFEROL) 58719 units CAPS capsule TK 1 C PO WEEKLY Historical Provider, MD   B Complex-C-Folic Acid (VIRT-CAPS) 1 MG CAPS TK ONE C PO  QD  Rebeca Starkey MD   Pediatric Multivitamins-Fl (MULTI VIT/FL) 0.25 MG CHEW Take 25 mg by mouth daily  Rebeca Starkey MD   flunisolide (NASALIDE) 25 MCG/ACT (0.025%) SOLN Inhale 2 sprays into the lungs every 12 hours  Pao Pittman MD   Tiotropium Bromide-Olodaterol (STIOLTO RESPIMAT) 2.5-2.5 MCG/ACT AERS Inhale 2 puffs into the lungs daily  Pao Pittman MD   oxyCODONE-acetaminophen (PERCOCET) 7.5-325 MG per tablet Take 1 tablet by mouth every 4 hours as needed for Pain. Alf Matthews Historical Provider, MD   Polyethylene Glycol 3350 GRAN   Historical Provider, MD   Glucose Blood (BLOOD GLUCOSE TEST STRIPS) STRP TEST 3-4 TIMES DAILY, AS DIRECTED  Tiny Rashid MD   Blood Glucose Monitoring Suppl ADAM USE AS DIRECTED. Tiny Rashid MD   Alcohol Swabs PADS USE AS DIRECTED  Tiny Rashid MD   albuterol sulfate  (90 Base) MCG/ACT inhaler Inhale 2 puffs into the lungs every 6 hours as needed for Wheezing  Victor Manuel Oviedo MD   ipratropium-albuterol (DUONEB) 0.5-2.5 (3) MG/3ML SOLN nebulizer solution Inhale 3 mLs into the lungs every 6 hours as needed for Shortness of Breath  Edith Mcgrath MD   Lancets MISC Test daily  Jessica Stacy MD   calcium carbonate (TUMS) 500 MG chewable tablet Take 1 tablet by mouth 3 times daily as needed for Heartburn. Historical Provider, MD   aspirin 81 MG chewable tablet Take 1 tablet by mouth daily. Patient taking differently: Take 81 mg by mouth daily Indications: stopped on 6/25 for surgery   Carlito Jimenez MD       Social History     Tobacco Use    Smoking status: Current Some Day Smoker     Packs/day: 0.10     Years: 33.00     Pack years: 3.30     Types: Cigarettes    Smokeless tobacco: Never Used    Tobacco comment: TRYING TO QUIT 3-7 a day   Substance Use Topics    Alcohol use: Not Currently     Alcohol/week: 0.0 standard drinks     Comment: occ    Drug use:  No Allergies   Allergen Reactions    Morphine Nausea And Vomiting   ,   Past Medical History:   Diagnosis Date    Ambulatory dysfunction     walker for long distances, SOB with distance    Aortic stenosis     echo 2017    Arthritis     hands and hips    Asthma     Bilateral hilar adenopathy syndrome 6/3/2013    CAD (coronary artery disease)     Dr. Jona Morales [facility]) 04/19/2019    EF= 43%    CHF (congestive heart failure) (HCC)     Chronic pain     COPD (chronic obstructive pulmonary disease) ([address]     pulmonology Dr. Gilbert Sharif    Depression     Diabetes mellitus ([address]     borderline    Difficult intravenous access     Emphysema of lung ([address]     ESRD (end stage renal disease) on dialysis ([address]     MWF    Fear of needles     Gastric ulcer     GERD (gastroesophageal reflux disease)     Heart valve problem     bicuspic valve    Hemodialysis patient ([address]     History of spinal fracture     work incident    Hx of blood clots     Bilateral lower extremities; stents in place    Hyperlipidemia     Hypertension     MI (myocardial infarction) ([address] 2019    has had 9 MIs. 2019 was the last    Neuromuscular disorder ([address]     due to CVA    Numbness and tingling in left arm     from fistula    Pneumonia     PONV (postoperative nausea and vomiting)     Prolonged emergence from general anesthesia     States requires more medication than most people    Sleep apnea     Uses CPAP    Stroke ([address]     7mm thalamic cva 2017 deficts left side, left side weakness    TIA (transient ischemic attack)     Unspecified diseases of blood and blood-forming organs    ,   Past Surgical History:   Procedure Laterality Date    AORTIC VALVE REPLACEMENT N/A 10/15/2019    TRANSCATHETER AORTIC VALVE REPLACEMENT FEMORAL APPROACH performed by Estefania Aldridge MD at [address] Right 7/2/2019    PERITONEAL DIALYSIS CATHETER REMOVAL performed by Nicholas Silverman MD at systems was limited: Vitals/Constitutional/EENT/Resp/CV/GI//MS/Neuro/Skin/Heme-Lymph-Imm. Pursuant to the emergency declaration under the [address] [place] [address] and the [employer] Act, this Virtual Visit was conducted with patient's (and/or legal guardian's) consent, to reduce the patient's risk of exposure to COVID-19 and provide necessary medical care. The patient (and/or legal guardian) has also been advised to contact this office for worsening conditions or problems, and seek emergency medical treatment and/or call 911 if deemed necessary. Services were provided through a video synchronous discussion virtually to substitute for in-person clinic visit. Patient and provider were located at their individual homes. --Abby Camargo MD on 4/21/2020 at 1:14 PM    An electronic signature was used to authenticate this note.

## 2020-04-25 ENCOUNTER — HOSPITAL ENCOUNTER (INPATIENT)
Age: 50
LOS: 11 days | Discharge: NURSING, REHAB OR LONG-TERM CARE | DRG: 637 | End: 2020-05-06
Attending: EMERGENCY MEDICINE | Admitting: INTERNAL MEDICINE
Payer: COMMERCIAL

## 2020-04-25 ENCOUNTER — APPOINTMENT (OUTPATIENT)
Dept: MRI IMAGING | Age: 50
DRG: 637 | End: 2020-04-25
Payer: COMMERCIAL

## 2020-04-25 ENCOUNTER — APPOINTMENT (OUTPATIENT)
Dept: GENERAL RADIOLOGY | Age: 50
DRG: 637 | End: 2020-04-25
Payer: COMMERCIAL

## 2020-04-25 ENCOUNTER — APPOINTMENT (OUTPATIENT)
Dept: CT IMAGING | Age: 50
DRG: 637 | End: 2020-04-25
Payer: COMMERCIAL

## 2020-04-25 PROBLEM — E10.10 DKA, TYPE 1, NOT AT GOAL (HCC): Status: ACTIVE | Noted: 2020-04-25

## 2020-04-25 LAB
A/G RATIO: 1.2 (ref 1.1–2.2)
ALBUMIN SERPL-MCNC: 3.8 G/DL (ref 3.4–5)
ALP BLD-CCNC: 95 U/L (ref 40–129)
ALT SERPL-CCNC: 15 U/L (ref 10–40)
ANION GAP SERPL CALCULATED.3IONS-SCNC: 19 MMOL/L (ref 3–16)
ANION GAP SERPL CALCULATED.3IONS-SCNC: 20 MMOL/L (ref 3–16)
AST SERPL-CCNC: 16 U/L (ref 15–37)
BASE EXCESS VENOUS: -3.5 MMOL/L (ref -3–3)
BASOPHILS ABSOLUTE: 0 K/UL (ref 0–0.2)
BASOPHILS RELATIVE PERCENT: 0.1 %
BILIRUB SERPL-MCNC: <0.2 MG/DL (ref 0–1)
BILIRUBIN URINE: NEGATIVE
BLOOD, URINE: NEGATIVE
BUN BLDV-MCNC: 81 MG/DL (ref 7–20)
BUN BLDV-MCNC: 82 MG/DL (ref 7–20)
CALCIUM SERPL-MCNC: 9 MG/DL (ref 8.3–10.6)
CALCIUM SERPL-MCNC: 9 MG/DL (ref 8.3–10.6)
CARBOXYHEMOGLOBIN: 5.4 % (ref 0–1.5)
CHLORIDE BLD-SCNC: 86 MMOL/L (ref 99–110)
CHLORIDE BLD-SCNC: 94 MMOL/L (ref 99–110)
CLARITY: CLEAR
CO2: 18 MMOL/L (ref 21–32)
CO2: 19 MMOL/L (ref 21–32)
COLOR: YELLOW
CREAT SERPL-MCNC: 4.3 MG/DL (ref 0.9–1.3)
CREAT SERPL-MCNC: 4.3 MG/DL (ref 0.9–1.3)
EOSINOPHILS ABSOLUTE: 0 K/UL (ref 0–0.6)
EOSINOPHILS RELATIVE PERCENT: 0 %
EPITHELIAL CELLS, UA: NORMAL /HPF (ref 0–5)
GFR AFRICAN AMERICAN: 18
GFR AFRICAN AMERICAN: 18
GFR NON-AFRICAN AMERICAN: 15
GFR NON-AFRICAN AMERICAN: 15
GLOBULIN: 3.1 G/DL
GLUCOSE BLD-MCNC: 188 MG/DL (ref 70–99)
GLUCOSE BLD-MCNC: 225 MG/DL (ref 70–99)
GLUCOSE BLD-MCNC: 293 MG/DL (ref 70–99)
GLUCOSE BLD-MCNC: 388 MG/DL (ref 70–99)
GLUCOSE BLD-MCNC: 421 MG/DL (ref 70–99)
GLUCOSE BLD-MCNC: 487 MG/DL (ref 70–99)
GLUCOSE BLD-MCNC: 503 MG/DL (ref 70–99)
GLUCOSE BLD-MCNC: 688 MG/DL (ref 70–99)
GLUCOSE BLD-MCNC: 758 MG/DL (ref 70–99)
GLUCOSE BLD-MCNC: >600 MG/DL (ref 70–99)
GLUCOSE URINE: >=1000 MG/DL
HCO3 VENOUS: 22.9 MMOL/L (ref 23–29)
HCT VFR BLD CALC: 34.2 % (ref 40.5–52.5)
HEMOGLOBIN: 11.1 G/DL (ref 13.5–17.5)
INR BLD: 0.91 (ref 0.86–1.14)
KETONES, URINE: NEGATIVE MG/DL
LACTIC ACID: 2.7 MMOL/L (ref 0.4–2)
LEUKOCYTE ESTERASE, URINE: NEGATIVE
LYMPHOCYTES ABSOLUTE: 0.4 K/UL (ref 1–5.1)
LYMPHOCYTES RELATIVE PERCENT: 2.6 %
MAGNESIUM: 2.2 MG/DL (ref 1.8–2.4)
MCH RBC QN AUTO: 30.9 PG (ref 26–34)
MCHC RBC AUTO-ENTMCNC: 32.5 G/DL (ref 31–36)
MCV RBC AUTO: 95.3 FL (ref 80–100)
METHEMOGLOBIN VENOUS: 0.1 %
MICROSCOPIC EXAMINATION: YES
MONOCYTES ABSOLUTE: 0.3 K/UL (ref 0–1.3)
MONOCYTES RELATIVE PERCENT: 1.8 %
NEUTROPHILS ABSOLUTE: 14.8 K/UL (ref 1.7–7.7)
NEUTROPHILS RELATIVE PERCENT: 95.5 %
NITRITE, URINE: NEGATIVE
O2 CONTENT, VEN: 13 VOL %
O2 SAT, VEN: 85 %
O2 THERAPY: ABNORMAL
PCO2, VEN: 46.8 MMHG (ref 40–50)
PDW BLD-RTO: 14.9 % (ref 12.4–15.4)
PERFORMED ON: ABNORMAL
PH UA: 5.5 (ref 5–8)
PH VENOUS: 7.31 (ref 7.35–7.45)
PHOSPHORUS: 5.4 MG/DL (ref 2.5–4.9)
PLATELET # BLD: 195 K/UL (ref 135–450)
PMV BLD AUTO: 9.2 FL (ref 5–10.5)
PO2, VEN: 51.1 MMHG (ref 25–40)
POTASSIUM SERPL-SCNC: 4.8 MMOL/L (ref 3.5–5.1)
POTASSIUM SERPL-SCNC: 5.8 MMOL/L (ref 3.5–5.1)
PROTEIN UA: 100 MG/DL
PROTHROMBIN TIME: 10.6 SEC (ref 10–13.2)
RBC # BLD: 3.59 M/UL (ref 4.2–5.9)
RBC UA: NORMAL /HPF (ref 0–4)
SODIUM BLD-SCNC: 124 MMOL/L (ref 136–145)
SODIUM BLD-SCNC: 132 MMOL/L (ref 136–145)
SPECIFIC GRAVITY UA: 1.02 (ref 1–1.03)
TCO2 CALC VENOUS: 24 MMOL/L
TOTAL PROTEIN: 6.9 G/DL (ref 6.4–8.2)
TROPONIN: 0.02 NG/ML
URINE TYPE: ABNORMAL
UROBILINOGEN, URINE: 0.2 E.U./DL
WBC # BLD: 15.5 K/UL (ref 4–11)
WBC UA: NORMAL /HPF (ref 0–5)

## 2020-04-25 PROCEDURE — 74176 CT ABD & PELVIS W/O CONTRAST: CPT

## 2020-04-25 PROCEDURE — 85610 PROTHROMBIN TIME: CPT

## 2020-04-25 PROCEDURE — 82803 BLOOD GASES ANY COMBINATION: CPT

## 2020-04-25 PROCEDURE — 83930 ASSAY OF BLOOD OSMOLALITY: CPT

## 2020-04-25 PROCEDURE — 2000000000 HC ICU R&B

## 2020-04-25 PROCEDURE — 80053 COMPREHEN METABOLIC PANEL: CPT

## 2020-04-25 PROCEDURE — 96375 TX/PRO/DX INJ NEW DRUG ADDON: CPT

## 2020-04-25 PROCEDURE — 51798 US URINE CAPACITY MEASURE: CPT

## 2020-04-25 PROCEDURE — 99285 EMERGENCY DEPT VISIT HI MDM: CPT

## 2020-04-25 PROCEDURE — 36415 COLL VENOUS BLD VENIPUNCTURE: CPT

## 2020-04-25 PROCEDURE — 94640 AIRWAY INHALATION TREATMENT: CPT

## 2020-04-25 PROCEDURE — 6370000000 HC RX 637 (ALT 250 FOR IP): Performed by: INTERNAL MEDICINE

## 2020-04-25 PROCEDURE — 6360000002 HC RX W HCPCS: Performed by: EMERGENCY MEDICINE

## 2020-04-25 PROCEDURE — C9113 INJ PANTOPRAZOLE SODIUM, VIA: HCPCS | Performed by: INTERNAL MEDICINE

## 2020-04-25 PROCEDURE — 6370000000 HC RX 637 (ALT 250 FOR IP): Performed by: EMERGENCY MEDICINE

## 2020-04-25 PROCEDURE — 81001 URINALYSIS AUTO W/SCOPE: CPT

## 2020-04-25 PROCEDURE — 6360000002 HC RX W HCPCS: Performed by: INTERNAL MEDICINE

## 2020-04-25 PROCEDURE — 73560 X-RAY EXAM OF KNEE 1 OR 2: CPT

## 2020-04-25 PROCEDURE — 83605 ASSAY OF LACTIC ACID: CPT

## 2020-04-25 PROCEDURE — 73562 X-RAY EXAM OF KNEE 3: CPT

## 2020-04-25 PROCEDURE — 82947 ASSAY GLUCOSE BLOOD QUANT: CPT

## 2020-04-25 PROCEDURE — 84484 ASSAY OF TROPONIN QUANT: CPT

## 2020-04-25 PROCEDURE — 94660 CPAP INITIATION&MGMT: CPT

## 2020-04-25 PROCEDURE — 85025 COMPLETE CBC W/AUTO DIFF WBC: CPT

## 2020-04-25 PROCEDURE — 2580000003 HC RX 258: Performed by: EMERGENCY MEDICINE

## 2020-04-25 PROCEDURE — 72148 MRI LUMBAR SPINE W/O DYE: CPT

## 2020-04-25 PROCEDURE — 96374 THER/PROPH/DIAG INJ IV PUSH: CPT

## 2020-04-25 PROCEDURE — 84100 ASSAY OF PHOSPHORUS: CPT

## 2020-04-25 PROCEDURE — 83735 ASSAY OF MAGNESIUM: CPT

## 2020-04-25 PROCEDURE — 3209999900 CT LUMBAR SPINE TRAUMA RECONSTRUCTION

## 2020-04-25 PROCEDURE — 72125 CT NECK SPINE W/O DYE: CPT

## 2020-04-25 PROCEDURE — 70450 CT HEAD/BRAIN W/O DYE: CPT

## 2020-04-25 PROCEDURE — 93005 ELECTROCARDIOGRAM TRACING: CPT | Performed by: EMERGENCY MEDICINE

## 2020-04-25 RX ORDER — 0.9 % SODIUM CHLORIDE 0.9 %
500 INTRAVENOUS SOLUTION INTRAVENOUS ONCE
Status: COMPLETED | OUTPATIENT
Start: 2020-04-25 | End: 2020-04-25

## 2020-04-25 RX ORDER — DEXTROSE AND SODIUM CHLORIDE 5; .45 G/100ML; G/100ML
INJECTION, SOLUTION INTRAVENOUS CONTINUOUS PRN
Status: DISCONTINUED | OUTPATIENT
Start: 2020-04-25 | End: 2020-04-26

## 2020-04-25 RX ORDER — HEPARIN SODIUM 5000 [USP'U]/ML
5000 INJECTION, SOLUTION INTRAVENOUS; SUBCUTANEOUS EVERY 8 HOURS SCHEDULED
Status: DISCONTINUED | OUTPATIENT
Start: 2020-04-26 | End: 2020-05-06 | Stop reason: HOSPADM

## 2020-04-25 RX ORDER — DEXTROSE MONOHYDRATE 25 G/50ML
12.5 INJECTION, SOLUTION INTRAVENOUS PRN
Status: DISCONTINUED | OUTPATIENT
Start: 2020-04-25 | End: 2020-04-25 | Stop reason: SDUPTHER

## 2020-04-25 RX ORDER — PANTOPRAZOLE SODIUM 40 MG/10ML
40 INJECTION, POWDER, LYOPHILIZED, FOR SOLUTION INTRAVENOUS DAILY
Status: DISCONTINUED | OUTPATIENT
Start: 2020-04-25 | End: 2020-04-27

## 2020-04-25 RX ORDER — CLOPIDOGREL BISULFATE 75 MG/1
75 TABLET ORAL DAILY
Status: DISCONTINUED | OUTPATIENT
Start: 2020-04-25 | End: 2020-05-06 | Stop reason: HOSPADM

## 2020-04-25 RX ORDER — OXYCODONE AND ACETAMINOPHEN 7.5; 325 MG/1; MG/1
1 TABLET ORAL EVERY 4 HOURS PRN
Status: DISCONTINUED | OUTPATIENT
Start: 2020-04-25 | End: 2020-05-06 | Stop reason: HOSPADM

## 2020-04-25 RX ORDER — MAGNESIUM SULFATE 1 G/100ML
1 INJECTION INTRAVENOUS PRN
Status: DISCONTINUED | OUTPATIENT
Start: 2020-04-25 | End: 2020-04-26

## 2020-04-25 RX ORDER — DEXTROSE MONOHYDRATE 50 MG/ML
100 INJECTION, SOLUTION INTRAVENOUS PRN
Status: DISCONTINUED | OUTPATIENT
Start: 2020-04-25 | End: 2020-04-26 | Stop reason: SDUPTHER

## 2020-04-25 RX ORDER — HYDRALAZINE HYDROCHLORIDE 25 MG/1
25 TABLET, FILM COATED ORAL EVERY 8 HOURS SCHEDULED
Status: DISCONTINUED | OUTPATIENT
Start: 2020-04-25 | End: 2020-05-06 | Stop reason: HOSPADM

## 2020-04-25 RX ORDER — PRAVASTATIN SODIUM 80 MG/1
80 TABLET ORAL DAILY
Status: DISCONTINUED | OUTPATIENT
Start: 2020-04-26 | End: 2020-05-06 | Stop reason: HOSPADM

## 2020-04-25 RX ORDER — LOSARTAN POTASSIUM 25 MG/1
50 TABLET ORAL DAILY
Status: DISCONTINUED | OUTPATIENT
Start: 2020-04-25 | End: 2020-05-06 | Stop reason: HOSPADM

## 2020-04-25 RX ORDER — POTASSIUM CHLORIDE 7.45 MG/ML
10 INJECTION INTRAVENOUS PRN
Status: DISCONTINUED | OUTPATIENT
Start: 2020-04-25 | End: 2020-04-26

## 2020-04-25 RX ORDER — METOPROLOL SUCCINATE 25 MG/1
25 TABLET, EXTENDED RELEASE ORAL DAILY
Status: DISCONTINUED | OUTPATIENT
Start: 2020-04-25 | End: 2020-05-06 | Stop reason: HOSPADM

## 2020-04-25 RX ORDER — ONDANSETRON 2 MG/ML
4 INJECTION INTRAMUSCULAR; INTRAVENOUS ONCE
Status: COMPLETED | OUTPATIENT
Start: 2020-04-25 | End: 2020-04-25

## 2020-04-25 RX ORDER — QUETIAPINE FUMARATE 25 MG/1
50 TABLET, FILM COATED ORAL EVERY EVENING
Status: DISCONTINUED | OUTPATIENT
Start: 2020-04-25 | End: 2020-05-06 | Stop reason: HOSPADM

## 2020-04-25 RX ORDER — DEXTROSE MONOHYDRATE 25 G/50ML
12.5 INJECTION, SOLUTION INTRAVENOUS PRN
Status: DISCONTINUED | OUTPATIENT
Start: 2020-04-25 | End: 2020-04-26 | Stop reason: SDUPTHER

## 2020-04-25 RX ORDER — SODIUM CHLORIDE 9 MG/ML
INJECTION, SOLUTION INTRAVENOUS CONTINUOUS
Status: DISCONTINUED | OUTPATIENT
Start: 2020-04-25 | End: 2020-04-26

## 2020-04-25 RX ORDER — ASPIRIN 81 MG/1
81 TABLET, CHEWABLE ORAL DAILY
Status: DISCONTINUED | OUTPATIENT
Start: 2020-04-25 | End: 2020-05-06 | Stop reason: HOSPADM

## 2020-04-25 RX ORDER — NICOTINE POLACRILEX 4 MG
15 LOZENGE BUCCAL PRN
Status: DISCONTINUED | OUTPATIENT
Start: 2020-04-25 | End: 2020-04-26 | Stop reason: SDUPTHER

## 2020-04-25 RX ADMIN — SODIUM CHLORIDE 0.1 UNITS/KG/HR: 9 INJECTION, SOLUTION INTRAVENOUS at 14:55

## 2020-04-25 RX ADMIN — HYDRALAZINE HYDROCHLORIDE 25 MG: 25 TABLET, FILM COATED ORAL at 22:29

## 2020-04-25 RX ADMIN — GLYCOPYRROLATE AND FORMOTEROL FUMARATE 2 PUFF: 9; 4.8 AEROSOL, METERED RESPIRATORY (INHALATION) at 21:27

## 2020-04-25 RX ADMIN — QUETIAPINE FUMARATE 50 MG: 25 TABLET ORAL at 22:28

## 2020-04-25 RX ADMIN — HYDROMORPHONE HYDROCHLORIDE 1 MG: 1 INJECTION, SOLUTION INTRAMUSCULAR; INTRAVENOUS; SUBCUTANEOUS at 16:57

## 2020-04-25 RX ADMIN — METOPROLOL SUCCINATE 25 MG: 25 TABLET, EXTENDED RELEASE ORAL at 22:28

## 2020-04-25 RX ADMIN — HYDROMORPHONE HYDROCHLORIDE 1 MG: 1 INJECTION, SOLUTION INTRAMUSCULAR; INTRAVENOUS; SUBCUTANEOUS at 12:51

## 2020-04-25 RX ADMIN — ONDANSETRON 4 MG: 2 INJECTION INTRAMUSCULAR; INTRAVENOUS at 12:53

## 2020-04-25 RX ADMIN — SODIUM CHLORIDE 500 ML: 9 INJECTION, SOLUTION INTRAVENOUS at 14:55

## 2020-04-25 RX ADMIN — PANTOPRAZOLE SODIUM 40 MG: 40 INJECTION, POWDER, LYOPHILIZED, FOR SOLUTION INTRAVENOUS at 22:28

## 2020-04-25 ASSESSMENT — PAIN DESCRIPTION - ORIENTATION
ORIENTATION: LEFT
ORIENTATION: LEFT

## 2020-04-25 ASSESSMENT — ENCOUNTER SYMPTOMS
DIARRHEA: 0
VOMITING: 0
COLOR CHANGE: 0
BACK PAIN: 1
ABDOMINAL PAIN: 0
STRIDOR: 0
SHORTNESS OF BREATH: 1
NAUSEA: 0
COUGH: 0

## 2020-04-25 ASSESSMENT — PAIN DESCRIPTION - LOCATION
LOCATION: HIP
LOCATION: HIP

## 2020-04-25 ASSESSMENT — PAIN SCALES - GENERAL
PAINLEVEL_OUTOF10: 6
PAINLEVEL_OUTOF10: 10
PAINLEVEL_OUTOF10: 7
PAINLEVEL_OUTOF10: 7

## 2020-04-25 ASSESSMENT — PAIN DESCRIPTION - FREQUENCY: FREQUENCY: CONTINUOUS

## 2020-04-25 ASSESSMENT — PAIN DESCRIPTION - PAIN TYPE
TYPE: ACUTE PAIN
TYPE: ACUTE PAIN

## 2020-04-25 ASSESSMENT — PAIN DESCRIPTION - PROGRESSION
CLINICAL_PROGRESSION: GRADUALLY IMPROVING
CLINICAL_PROGRESSION: GRADUALLY IMPROVING

## 2020-04-25 NOTE — ED NOTES
Bladder scanned pt,. Pt showed to have 27mL of urine in bladder. Physician notified.       Amy Lane  04/25/20 0780

## 2020-04-25 NOTE — ED PROVIDER NOTES
and vomiting)     Prolonged emergence from general anesthesia     States requires more medication than most people    Sleep apnea     Uses CPAP    Stroke ([facility] [address]     7mm thalamic cva 2017 deficts left side, left side weakness    TIA (transient ischemic attack)     Unspecified diseases of blood and blood-forming organs          SURGICAL HISTORY       Past Surgical History:   Procedure Laterality Date    AORTIC VALVE REPLACEMENT N/A 10/15/2019    TRANSCATHETER AORTIC VALVE REPLACEMENT FEMORAL APPROACH performed by Kera Vivar MD at [address] [place] [address] Ave Right 7/2/2019    PERITONEAL DIALYSIS CATHETER REMOVAL performed by Tate Restrepo MD at [facility] COLONOSCOPY  2/29/2015    WN    CORONARY ANGIOPLASTY WITH STENT PLACEMENT  05/26/15    CYST REMOVAL  08/14/2013    EXCISION CYSTS, NECK X2 AND ABDOMINAL benign    DIAGNOSTIC CARDIAC CATH LAB PROCEDURE      DIALYSIS FISTULA CREATION Left 10/30/2017    LEFT BRACHIAL CEPHALIC FISTULA    DIALYSIS FISTULA CREATION Left 3/27/2019    LIGATION  AV FISTULA performed by Magaly Germain MD at [facility], COLON, DIAGNOSTIC      OTHER SURGICAL HISTORY  02/01/2017    laparoscopic cholecystectomy with intraoperative cholangiogram    OTHER SURGICAL HISTORY  2018    PORT PLACEMENT  - vas cath    OTHER SURGICAL HISTORY Bilateral 06/26/2018    laprascopic peritoneal dialysis catheter placement    OTHER SURGICAL HISTORY Right 09/2018    peritoneal dialysis port placed on right side of abdomen    OTHER SURGICAL HISTORY  05/28/2019    PTA/Stenting R External Iliac artery    MN LAP INSERTION TUNNELED INTRAPERITONEAL CATHETER N/A 9/21/2018    LAPAROSCOPIC PERITONEAL DIALYSIS CATHETER REPLACEMENT performed by Tate Restrepo MD at [address]  01/06/2016    UPPER GASTROINTESTINAL ENDOSCOPY  01/29/2017    possible candida, otherwise normal appearing    VASCULAR meetings of clubs or organizations: None     Relationship status: None    Intimate partner violence     Fear of current or ex partner: None     Emotionally abused: None     Physically abused: None     Forced sexual activity: None   Other Topics Concern    None   Social History Narrative    None       SCREENINGS    Robert Coma Scale  Eye Opening: Spontaneous  Best Verbal Response: Oriented  Best Motor Response: Obeys commands  Robert Coma Scale Score: 15           PHYSICAL EXAM    (up to 7 for level 4, 8 or more for level 5)     ED Triage Vitals [04/25/20 1218]   BP Temp Temp Source Pulse Resp SpO2 Height Weight   (!) 139/105 98 °F (36.7 °C) Oral 62 18 -- 5' 9\" (1.753 m) 256 lb (116.1 kg)       Physical Exam  Vitals signs and nursing note reviewed. Constitutional:       General: He is in acute distress (mild). Appearance: Normal appearance. He is well-developed. He is not ill-appearing, toxic-appearing or diaphoretic. HENT:      Head: Normocephalic and atraumatic. Right Ear: External ear normal.      Left Ear: External ear normal.      Nose: Nose normal.   Eyes:      General: No scleral icterus. Right eye: No discharge. Left eye: No discharge. Pupils: Pupils are equal, round, and reactive to light. Neck:      Musculoskeletal: Full passive range of motion without pain, normal range of motion and neck supple. Normal range of motion. No edema, erythema, neck rigidity, crepitus, injury, pain with movement, torticollis, spinous process tenderness or muscular tenderness. Trachea: Trachea and phonation normal. No tracheal deviation. Cardiovascular:      Rate and Rhythm: Normal rate and regular rhythm. Pulses: Normal pulses. Pulmonary:      Effort: Pulmonary effort is normal. No accessory muscle usage or respiratory distress. Breath sounds: Normal breath sounds. No stridor. No decreased breath sounds, wheezing, rhonchi or rales.    Chest:      Chest wall: No tenderness. Abdominal:      General: Bowel sounds are normal. There is no distension. Palpations: Abdomen is soft. Tenderness: There is no abdominal tenderness. There is no right CVA tenderness, left CVA tenderness, guarding or rebound. Musculoskeletal:         General: Tenderness (left lumbar region on back exam) present. No deformity or signs of injury. Right shoulder: He exhibits normal range of motion, no tenderness, no bony tenderness and no deformity. Left shoulder: He exhibits normal range of motion, no tenderness, no bony tenderness and no deformity. Right elbow: He exhibits normal range of motion. No tenderness found. Left elbow: He exhibits normal range of motion. No tenderness found. Right wrist: He exhibits normal range of motion, no tenderness and no bony tenderness. Left wrist: He exhibits normal range of motion, no tenderness and no bony tenderness. Right hip: He exhibits normal range of motion, normal strength, no tenderness and no bony tenderness. Left hip: He exhibits decreased range of motion and tenderness. Right knee: He exhibits normal range of motion. No tenderness found. Left knee: Tenderness found. Right ankle: He exhibits normal range of motion. No tenderness. Left ankle: He exhibits normal range of motion. No tenderness. Cervical back: He exhibits normal range of motion, no tenderness and no bony tenderness. Thoracic back: He exhibits normal range of motion, no tenderness and no bony tenderness. Lumbar back: He exhibits tenderness (mainly left lumbar region but some midline tenderness is present), bony tenderness and pain. He exhibits no swelling, no edema, no deformity and no laceration. Right lower leg: No edema. Left lower leg: No edema. Skin:     General: Skin is warm and dry. Coloration: Skin is not cyanotic, jaundiced or pale.       Findings: No bruising, ecchymosis, erythema, signs of injury, laceration, lesion, petechiae, rash or wound. Neurological:      Mental Status: He is alert and oriented to person, place, and time. GCS: GCS eye subscore is 4. GCS verbal subscore is 5. GCS motor subscore is 6. Cranial Nerves: No cranial nerve deficit. Sensory: No sensory deficit. Motor: Weakness (bilateral legs with strength 3+/5 with leg extension, able to wiggle toes bilaterally, normal  strength bilaterally) present. No tremor, atrophy, abnormal muscle tone or seizure activity. Gait: Gait abnormal (not tested due to weakness). Psychiatric:         Mood and Affect: Mood is anxious. Speech: Speech normal.         Behavior: Behavior normal. Behavior is cooperative.              DIAGNOSTIC RESULTS   :    Labs Reviewed   CBC WITH AUTO DIFFERENTIAL - Abnormal; Notable for the following components:       Result Value    WBC 15.5 (*)     RBC 3.59 (*)     Hemoglobin 11.1 (*)     Hematocrit 34.2 (*)     Neutrophils Absolute 14.8 (*)     Lymphocytes Absolute 0.4 (*)     All other components within normal limits    Narrative:     Performed at:  [place] [address] [employer]   Phone [phone]   COMPREHENSIVE METABOLIC PANEL - Abnormal; Notable for the following components:    Sodium 124 (*)     Potassium 5.8 (*)     Chloride 86 (*)     CO2 18 (*)     Anion Gap 20 (*)     Glucose 758 (*)     BUN 81 (*)     CREATININE 4.3 (*)     GFR Non- 15 (*)     GFR  18 (*)     All other components within normal limits    Narrative:     Evelina Munoz tel. [id number],  Chemistry results called to and read back by Thu Rojas RN, 04/25/2020  13:16[facility]  Chemistry results called to and read back by Thu Rojas RN, 04/25/2020  13:15, [facility]  Performed at:  [place] [address] [employer] [address]   Phone [phone]   TROPONIN - Radiologist below, if available at the time of this note:    XR KNEE LEFT (1-2 VIEWS)   Final Result   1. No acute abnormality. CT LUMBAR SPINE TRAUMA RECONSTRUCTION   Preliminary Result   1. No evidence of traumatic injury to the chest.  No acute pulmonary findings. 2. Atherosclerotic calcification in the aorta and coronary circulation. 3. No evidence of traumatic abdominal or pelvic visceral injury. 4. No evidence of traumatic injury to the lumbar spine. Stable mild   compression fractures of the superior endplate of X49 and O35. CT CHEST ABDOMEN PELVIS WO CONTRAST   Preliminary Result   1. No evidence of traumatic injury to the chest.  No acute pulmonary findings. 2. Atherosclerotic calcification in the aorta and coronary circulation. 3. No evidence of traumatic abdominal or pelvic visceral injury. 4. No evidence of traumatic injury to the lumbar spine. Stable mild   compression fractures of the superior endplate of K05 and N61. CT Head WO Contrast   Final Result   1. No acute intracranial abnormality. CT Cervical Spine WO Contrast   Final Result   No acute abnormality of the cervical spine.          MRI LUMBAR SPINE WO CONTRAST    (Results Pending)         PROCEDURES   Unless otherwise noted below, none     Procedures    CRITICAL CARE TIME   N/A    CONSULTS:  None    EMERGENCY DEPARTMENT COURSE and DIFFERENTIAL DIAGNOSIS/MDM:   Vitals:    Vitals:    04/25/20 1218 04/25/20 1431   BP: (!) 139/105 138/85   Pulse: 62 58   Resp: 18 13   Temp: 98 °F (36.7 °C)    TempSrc: Oral    SpO2: 95% 96%   Weight: 256 lb (116.1 kg)    Height: 5' 9\" (1.753 m)        Patient was given the following medications:  Medications   0.9 % sodium chloride bolus (500 mLs Intravenous New Bag 4/25/20 1455)   glucose (GLUTOSE) 40 % oral gel 15 g (has no administration in time range)   dextrose 50 % IV solution (has no administration in time range)   glucagon (rDNA) injection 1 mg (has no

## 2020-04-25 NOTE — ED PROVIDER NOTES
initial encounter    5. Generalized weakness    6. DKA, type 2, not at goal [facility])        Blood pressure (!) 175/62, pulse 63, temperature 98 °F (36.7 °C), temperature source Oral, resp. rate 16, height 5' 9\" (1.753 m), weight 256 lb (116.1 kg), SpO2 97 %. DISPOSITION  Admit    Luis Amado    Note: This chart was created using voice recognition dictation software. Efforts were made by me to ensure accuracy, however some errors may be present due to limitations of this technology and occasionally words are not transcribed correctly.        Luis Amado MD  04/25/20 1598

## 2020-04-25 NOTE — H&P
lispro (HUMALOG) 100 UNIT/ML injection vial Inject 10 Units into the skin 3 times daily (before meals) 3/4/20   Fely Roman MD   Insulin Syringe-Needle U-100 30G X 1/2\" 0.5 ML MISC 1 each by Does not apply route daily 3/4/20   Fely Roman MD   insulin glargine [facility]) 100 UNIT/ML injection pen Inject 30 Units into the skin nightly 3/4/20   Fely Roman MD   insulin aspart (NOVOLOG FLEXPEN) 100 UNIT/ML injection pen Inject 10 Units into the skin 3 times daily (before meals) 3/4/20   Fely Roman MD   nitroGLYCERIN (NITROSTAT) 0.4 MG SL tablet DISSOLVE 1 TABLET UNDER THE TONGUE AS NEEDED FOR CHEST PAIN EVERY 5 MINUTES UP TO 3 TIMES.  IF NO RELIEF CALL 911. 2/7/20   Fely Roman MD   sennosides-docusate sodium (SENOKOT-S) 8.6-50 MG tablet Take 1 tablet by mouth daily 2/4/20   Jossie Boone MD   pravastatin (PRAVACHOL) 80 MG tablet TAKE 1 TABLET BY MOUTH ONCE DAILY 1/10/20   Noa Mendez MD   clopidogrel (PLAVIX) 75 MG tablet TAKE 1 TABLET BY MOUTH ONCE DAILY 1/10/20   Noa Mendez MD   metoprolol succinate (TOPROL XL) 25 MG extended release tablet Take 1 tablet by mouth daily 12/10/19   Yogesh Williamson MD   hydrOXYzine (VISTARIL) 50 MG capsule TAKE 1 TO 2 CAPSULES BY MOUTH NIGHTLY 12/4/19   Fely Roman MD   fluticasone-umeclidin-vilant (TRELEGY ELLIPTA) 550-97.9-43 MCG/INH AEPB Inhale 1 puff into the lungs daily 11/19/19   Marcus Sparks MD   gabapentin (NEURONTIN) 100 MG capsule TAKE 1 TO 2 CAPSULES BY MOUTH THREE TIMES A DAY 11/6/19 3/4/20  Fely Roman MD   ondansetron (ZOFRAN ODT) 4 MG disintegrating tablet Take 1 tablet by mouth every 8 hours as needed for Nausea 11/6/19   JASE David   calcium acetate (PHOSLO) 667 MG capsule Take 1 capsule by mouth 3 times daily (with meals) 10/23/19   JYA Wallace - CNP   traZODone (DESYREL) 150 MG tablet TAKE (1) TABLET BY MOUTH NIGHTLY 10/9/19   Fely Roman MD   umeclidinium-vilanterol [facility] ELLIP) 62.5-25 without deformity. Skin: Skin color, texture, turgor normal.  No rashes or lesions. Neurologic:  Neurovascularly intact without any focal sensory/motor deficits. Cranial nerves: II-XII intact, grossly non-focal.  Psychiatric:  Alert and oriented, thought content appropriate, normal insight  Capillary Refill: Brisk,< 3 seconds   Peripheral Pulses: +2 palpable, equal bilaterally       Labs:     Recent Labs     04/25/20  1238   WBC 15.5*   HGB 11.1*   HCT 34.2*        Recent Labs     04/25/20  1238   *   K 5.8*   CL 86*   CO2 18*   BUN 81*   CREATININE 4.3*   CALCIUM 9.0     Recent Labs     04/25/20  1238   AST 16   ALT 15   BILITOT <0.2   ALKPHOS 95     Recent Labs     04/25/20  1238   INR 0.91     Recent Labs     04/25/20  1238   TROPONINI 0.02*       Urinalysis:      Lab Results   Component Value Date    NITRU Negative 04/25/2020    WBCUA 3-5 04/25/2020    BACTERIA 1+ 03/10/2020    RBCUA None seen 04/25/2020    BLOODU Negative 04/25/2020    SPECGRAV 1.020 04/25/2020    GLUCOSEU >=1000 04/25/2020       Radiology:     CXR: I have reviewed the CXR with the following interpretation:   EKG:  I have reviewed the EKG with the following interpretation: Sinus bradycardia 57/min no acute ST changes    MRI LUMBAR SPINE WO CONTRAST   Final Result   Lumbar spine visualized starting at the level of the inferior endplate of B07. No acute abnormality in the lumbar spine. Degenerative disc disease without spinal canal narrowing. Foraminal narrowing, moderate at right L5-S1. XR KNEE LEFT (1-2 VIEWS)   Final Result   1. No acute abnormality. CT LUMBAR SPINE TRAUMA RECONSTRUCTION   Preliminary Result   1. No evidence of traumatic injury to the chest.  No acute pulmonary findings. 2. Atherosclerotic calcification in the aorta and coronary circulation. 3. No evidence of traumatic abdominal or pelvic visceral injury. 4. No evidence of traumatic injury to the lumbar spine.   Stable mild compression fractures of the superior endplate of L14 and T66. CT CHEST ABDOMEN PELVIS WO CONTRAST   Preliminary Result   1. No evidence of traumatic injury to the chest.  No acute pulmonary findings. 2. Atherosclerotic calcification in the aorta and coronary circulation. 3. No evidence of traumatic abdominal or pelvic visceral injury. 4. No evidence of traumatic injury to the lumbar spine. Stable mild   compression fractures of the superior endplate of F56 and K16. CT Head WO Contrast   Final Result   1. No acute intracranial abnormality. CT Cervical Spine WO Contrast   Final Result   No acute abnormality of the cervical spine.              ASSESSMENT:    Active Hospital Problems    Diagnosis Date Noted    DKA, type 1, not at goal [facility]) [E10.10] 04/25/2020         PLAN:  DKA-admit to ICU, IV insulin, judicious IV fluids, monitor BMP magnesium phosphorus every 4 hours, n.p.o.    ESRD on hemodialysis-Monday Wednesday Friday, waiting for extra dialysis today he could not make it-currently no evidence of fluid overload, he urinate but not that much-nephrology evaluation    Hyponatremia-pseudo-secondary to elevated blood sugar    Hyperkalemia-secondary to acidosis-monitor with IV insulin    Leukocytosis-most probably secondary to hemoconcentration no evidence of infection at this time monitor closely    S/p fall and subjective feeling of bilateral lower extremity weakness-secondary to above there is no apparent injuries    Chronic compression fracture of T12 and T11    Chronic diastolic CHF-no evidence of fluid overload now    Obstructive sleep apnea-verify the CPAP use    COPD-stable    Hypertension-continue home medication , hydralazine and losartan       CAD-aspirin statin and beta-blocker      DVT Prophylaxis: IV heparin  Diet: N.p.o.   code Status: Full code    PT/OT Eval Status:     Dispo -admitted to Sue Christensen MD    Thank you Kalpana Cortez MD for the opportunity to be involved in this patient's care. If you have any questions or concerns please feel free to contact me at [phone].

## 2020-04-26 LAB
ANION GAP SERPL CALCULATED.3IONS-SCNC: 16 MMOL/L (ref 3–16)
BUN BLDV-MCNC: 83 MG/DL (ref 7–20)
BUN BLDV-MCNC: 84 MG/DL (ref 7–20)
BUN BLDV-MCNC: 85 MG/DL (ref 7–20)
CALCIUM SERPL-MCNC: 8.8 MG/DL (ref 8.3–10.6)
CALCIUM SERPL-MCNC: 8.9 MG/DL (ref 8.3–10.6)
CALCIUM SERPL-MCNC: 9 MG/DL (ref 8.3–10.6)
CHLORIDE BLD-SCNC: 96 MMOL/L (ref 99–110)
CHLORIDE BLD-SCNC: 97 MMOL/L (ref 99–110)
CHLORIDE BLD-SCNC: 97 MMOL/L (ref 99–110)
CO2: 24 MMOL/L (ref 21–32)
CREAT SERPL-MCNC: 4.5 MG/DL (ref 0.9–1.3)
CREAT SERPL-MCNC: 4.6 MG/DL (ref 0.9–1.3)
CREAT SERPL-MCNC: 4.7 MG/DL (ref 0.9–1.3)
EKG ATRIAL RATE: 57 BPM
EKG DIAGNOSIS: NORMAL
EKG P AXIS: 42 DEGREES
EKG P-R INTERVAL: 196 MS
EKG Q-T INTERVAL: 476 MS
EKG QRS DURATION: 90 MS
EKG QTC CALCULATION (BAZETT): 463 MS
EKG R AXIS: -9 DEGREES
EKG T AXIS: 79 DEGREES
EKG VENTRICULAR RATE: 57 BPM
GFR AFRICAN AMERICAN: 16
GFR AFRICAN AMERICAN: 16
GFR AFRICAN AMERICAN: 17
GFR NON-AFRICAN AMERICAN: 13
GFR NON-AFRICAN AMERICAN: 13
GFR NON-AFRICAN AMERICAN: 14
GLUCOSE BLD-MCNC: 100 MG/DL (ref 70–99)
GLUCOSE BLD-MCNC: 100 MG/DL (ref 70–99)
GLUCOSE BLD-MCNC: 101 MG/DL (ref 70–99)
GLUCOSE BLD-MCNC: 106 MG/DL (ref 70–99)
GLUCOSE BLD-MCNC: 114 MG/DL (ref 70–99)
GLUCOSE BLD-MCNC: 130 MG/DL (ref 70–99)
GLUCOSE BLD-MCNC: 133 MG/DL (ref 70–99)
GLUCOSE BLD-MCNC: 143 MG/DL (ref 70–99)
GLUCOSE BLD-MCNC: 166 MG/DL (ref 70–99)
GLUCOSE BLD-MCNC: 291 MG/DL (ref 70–99)
GLUCOSE BLD-MCNC: 355 MG/DL (ref 70–99)
GLUCOSE BLD-MCNC: 404 MG/DL (ref 70–99)
GLUCOSE BLD-MCNC: 405 MG/DL (ref 70–99)
GLUCOSE BLD-MCNC: 448 MG/DL (ref 70–99)
MAGNESIUM: 2.1 MG/DL (ref 1.8–2.4)
OSMOLALITY: 338 MOSM/KG (ref 275–295)
PERFORMED ON: ABNORMAL
PHOSPHORUS: 5.3 MG/DL (ref 2.5–4.9)
PHOSPHORUS: 5.9 MG/DL (ref 2.5–4.9)
PHOSPHORUS: 6 MG/DL (ref 2.5–4.9)
POTASSIUM SERPL-SCNC: 4 MMOL/L (ref 3.5–5.1)
POTASSIUM SERPL-SCNC: 4 MMOL/L (ref 3.5–5.1)
POTASSIUM SERPL-SCNC: 4.3 MMOL/L (ref 3.5–5.1)
SODIUM BLD-SCNC: 136 MMOL/L (ref 136–145)
SODIUM BLD-SCNC: 137 MMOL/L (ref 136–145)
SODIUM BLD-SCNC: 137 MMOL/L (ref 136–145)

## 2020-04-26 PROCEDURE — 6370000000 HC RX 637 (ALT 250 FOR IP): Performed by: INTERNAL MEDICINE

## 2020-04-26 PROCEDURE — 80048 BASIC METABOLIC PNL TOTAL CA: CPT

## 2020-04-26 PROCEDURE — 36415 COLL VENOUS BLD VENIPUNCTURE: CPT

## 2020-04-26 PROCEDURE — C9113 INJ PANTOPRAZOLE SODIUM, VIA: HCPCS | Performed by: INTERNAL MEDICINE

## 2020-04-26 PROCEDURE — 84100 ASSAY OF PHOSPHORUS: CPT

## 2020-04-26 PROCEDURE — 83036 HEMOGLOBIN GLYCOSYLATED A1C: CPT

## 2020-04-26 PROCEDURE — 1200000000 HC SEMI PRIVATE

## 2020-04-26 PROCEDURE — 94640 AIRWAY INHALATION TREATMENT: CPT

## 2020-04-26 PROCEDURE — 6360000002 HC RX W HCPCS: Performed by: INTERNAL MEDICINE

## 2020-04-26 PROCEDURE — 2700000000 HC OXYGEN THERAPY PER DAY

## 2020-04-26 PROCEDURE — 83735 ASSAY OF MAGNESIUM: CPT

## 2020-04-26 PROCEDURE — 94660 CPAP INITIATION&MGMT: CPT

## 2020-04-26 PROCEDURE — 93010 ELECTROCARDIOGRAM REPORT: CPT | Performed by: INTERNAL MEDICINE

## 2020-04-26 PROCEDURE — 94761 N-INVAS EAR/PLS OXIMETRY MLT: CPT

## 2020-04-26 RX ORDER — INSULIN GLARGINE 100 [IU]/ML
10 INJECTION, SOLUTION SUBCUTANEOUS ONCE
Status: COMPLETED | OUTPATIENT
Start: 2020-04-26 | End: 2020-04-26

## 2020-04-26 RX ORDER — NICOTINE POLACRILEX 4 MG
15 LOZENGE BUCCAL PRN
Status: DISCONTINUED | OUTPATIENT
Start: 2020-04-26 | End: 2020-05-06 | Stop reason: HOSPADM

## 2020-04-26 RX ORDER — INSULIN GLARGINE 100 [IU]/ML
30 INJECTION, SOLUTION SUBCUTANEOUS NIGHTLY
Status: DISCONTINUED | OUTPATIENT
Start: 2020-04-26 | End: 2020-04-27

## 2020-04-26 RX ORDER — CHOLECALCIFEROL (VITAMIN D3) 125 MCG
5 CAPSULE ORAL NIGHTLY PRN
Status: DISCONTINUED | OUTPATIENT
Start: 2020-04-26 | End: 2020-05-06 | Stop reason: HOSPADM

## 2020-04-26 RX ORDER — DEXTROSE MONOHYDRATE 50 MG/ML
100 INJECTION, SOLUTION INTRAVENOUS PRN
Status: DISCONTINUED | OUTPATIENT
Start: 2020-04-26 | End: 2020-05-06 | Stop reason: HOSPADM

## 2020-04-26 RX ORDER — DEXTROSE MONOHYDRATE 25 G/50ML
12.5 INJECTION, SOLUTION INTRAVENOUS PRN
Status: DISCONTINUED | OUTPATIENT
Start: 2020-04-26 | End: 2020-05-06 | Stop reason: HOSPADM

## 2020-04-26 RX ADMIN — PANTOPRAZOLE SODIUM 40 MG: 40 INJECTION, POWDER, LYOPHILIZED, FOR SOLUTION INTRAVENOUS at 12:19

## 2020-04-26 RX ADMIN — INSULIN GLARGINE 30 UNITS: 100 INJECTION, SOLUTION SUBCUTANEOUS at 12:11

## 2020-04-26 RX ADMIN — INSULIN LISPRO 9 UNITS: 100 INJECTION, SOLUTION INTRAVENOUS; SUBCUTANEOUS at 23:50

## 2020-04-26 RX ADMIN — INSULIN GLARGINE 10 UNITS: 100 INJECTION, SOLUTION SUBCUTANEOUS at 20:28

## 2020-04-26 RX ADMIN — INSULIN LISPRO 10 UNITS: 100 INJECTION, SOLUTION INTRAVENOUS; SUBCUTANEOUS at 20:27

## 2020-04-26 RX ADMIN — INSULIN LISPRO 15 UNITS: 100 INJECTION, SOLUTION INTRAVENOUS; SUBCUTANEOUS at 16:45

## 2020-04-26 RX ADMIN — MELATONIN TAB 5 MG 5 MG: 5 TAB at 21:52

## 2020-04-26 RX ADMIN — PRAVASTATIN SODIUM 80 MG: 80 TABLET ORAL at 15:39

## 2020-04-26 RX ADMIN — LOSARTAN POTASSIUM 50 MG: 25 TABLET, FILM COATED ORAL at 12:19

## 2020-04-26 RX ADMIN — OXYCODONE HYDROCHLORIDE AND ACETAMINOPHEN 1 TABLET: 7.5; 325 TABLET ORAL at 12:06

## 2020-04-26 RX ADMIN — ASPIRIN 81 MG 81 MG: 81 TABLET ORAL at 12:19

## 2020-04-26 RX ADMIN — HEPARIN SODIUM 5000 UNITS: 5000 INJECTION INTRAVENOUS; SUBCUTANEOUS at 21:53

## 2020-04-26 RX ADMIN — OXYCODONE HYDROCHLORIDE AND ACETAMINOPHEN 1 TABLET: 7.5; 325 TABLET ORAL at 17:42

## 2020-04-26 RX ADMIN — OXYCODONE HYDROCHLORIDE AND ACETAMINOPHEN 1 TABLET: 7.5; 325 TABLET ORAL at 21:52

## 2020-04-26 RX ADMIN — HEPARIN SODIUM 5000 UNITS: 5000 INJECTION INTRAVENOUS; SUBCUTANEOUS at 15:41

## 2020-04-26 RX ADMIN — GLYCOPYRROLATE AND FORMOTEROL FUMARATE 2 PUFF: 9; 4.8 AEROSOL, METERED RESPIRATORY (INHALATION) at 20:13

## 2020-04-26 RX ADMIN — CLOPIDOGREL BISULFATE 75 MG: 75 TABLET ORAL at 12:19

## 2020-04-26 RX ADMIN — CALCIUM ACETATE 667 MG: 667 CAPSULE ORAL at 17:42

## 2020-04-26 RX ADMIN — HEPARIN SODIUM 5000 UNITS: 5000 INJECTION INTRAVENOUS; SUBCUTANEOUS at 06:51

## 2020-04-26 RX ADMIN — HYDRALAZINE HYDROCHLORIDE 25 MG: 25 TABLET, FILM COATED ORAL at 15:40

## 2020-04-26 RX ADMIN — HYDRALAZINE HYDROCHLORIDE 25 MG: 25 TABLET, FILM COATED ORAL at 22:05

## 2020-04-26 RX ADMIN — GLYCOPYRROLATE AND FORMOTEROL FUMARATE 2 PUFF: 9; 4.8 AEROSOL, METERED RESPIRATORY (INHALATION) at 12:14

## 2020-04-26 ASSESSMENT — PAIN SCALES - GENERAL
PAINLEVEL_OUTOF10: 5
PAINLEVEL_OUTOF10: 0
PAINLEVEL_OUTOF10: 9
PAINLEVEL_OUTOF10: 8
PAINLEVEL_OUTOF10: 7
PAINLEVEL_OUTOF10: 0

## 2020-04-26 NOTE — PROGRESS NOTES
04/25/20 2245   NIV Type   Skin Assessment Clean, dry, & intact   Skin Protection for O2 Device Yes   NIV Started/Stopped On   Equipment Type v60   Mode CPAP   Mask Type Full face mask   Mask Size Medium   Settings/Measurements   CPAP/EPAP 12 cmH2O   Resp 11   FiO2  21 %   Vt Exhaled 773 mL   Minute Volume 9.6 Liters   Mask Leak (lpm) 30 lpm   Comfort Level Good   Using Accessory Muscles No   SpO2 97   Alarm Settings   Alarms On Y   Press Low Alarm 6 cmH2O   High Pressure Alarm 30 cmH2O   Delay Alarm 20 sec(s)   Resp Rate Low Alarm 6   High Respiratory Rate 40 br/min

## 2020-04-26 NOTE — PROGRESS NOTES
04/26/20 0021   NIV Type   Skin Protection for O2 Device Yes   Equipment Type v60   Mode CPAP   Mask Type Full face mask   Mask Size Medium   Settings/Measurements   CPAP/EPAP 12 cmH2O   Resp 14   FiO2  21 %   Vt Exhaled 399 mL   Minute Volume 5.2 Liters   Mask Leak (lpm) 16 lpm   Comfort Level Good   Using Accessory Muscles No   SpO2 92   Alarm Settings   Alarms On Y   Press Low Alarm 6 cmH2O   High Pressure Alarm 30 cmH2O   Delay Alarm 20 sec(s)   Resp Rate Low Alarm 6   High Respiratory Rate 40 br/min

## 2020-04-26 NOTE — PROGRESS NOTES
sounds. Musculoskeletal: No clubbing, cyanosis or edema bilaterally. Full range of motion without deformity. Skin: Skin color, texture, turgor normal.  No rashes or lesions. Neurologic:  Neurovascularly intact without any focal sensory/motor deficits. Cranial nerves: II-XII intact, grossly non-focal.  Psychiatric: Alert and oriented, thought content appropriate, normal insight  Capillary Refill: Brisk,< 3 seconds   Peripheral Pulses: +2 palpable, equal bilaterally       Labs:   Recent Labs     04/25/20  1238   WBC 15.5*   HGB 11.1*   HCT 34.2*        Recent Labs     04/26/20  0315 04/26/20  0633 04/26/20  1016    137 136   K 4.0 4.0 4.3   CL 97* 97* 96*   CO2 24 24 24   BUN 83* 84* 85*   CREATININE 4.5* 4.7* 4.6*   CALCIUM 8.8 8.9 9.0   PHOS 5.3* 5.9* 6.0*     Recent Labs     04/25/20  1238   AST 16   ALT 15   BILITOT <0.2   ALKPHOS 95     Recent Labs     04/25/20  1238   INR 0.91     Recent Labs     04/25/20  1238   TROPONINI 0.02*       Urinalysis:      Lab Results   Component Value Date    NITRU Negative 04/25/2020    WBCUA 3-5 04/25/2020    BACTERIA 1+ 03/10/2020    RBCUA None seen 04/25/2020    BLOODU Negative 04/25/2020    SPECGRAV 1.020 04/25/2020    GLUCOSEU >=1000 04/25/2020       Consults:    IP CONSULT TO HOSPITALIST  IP CONSULT TO NEPHROLOGY      Assessment/Plan:    Active Hospital Problems    Diagnosis    S/p TAVR (transcatheter aortic valve replacement), bioprosthetic [Z95.3]     Priority: Medium    DKA, type 1, not at goal ([facility] [address] [E10.10]    Hypercholesteremia [E78.00]    End stage renal disease on dialysis ([facility] [address] [N18.6, Z99.2]    HTN (hypertension), benign [I10]    ZAINAB (obstructive sleep apnea) [G47.33]    Obesity (BMI 30-39. 9) [E66.9]    DM (diabetes mellitus) ([facility] [address] [E11.9]       DKA - admitted toICU on IV insulin, now off and transitioned to Lantus/FSBS/SSI     ESRD - on hemodialysis M/W/F.   Nephrology consulted and appreciated.      HTN/CAD - w/ known CAD but no evidence of active signs/sxs of ischemia/failure. Currently controlled on home meds w/ vitals reviewed and documented as above. CHF - chronic diastolic failure w/ reduced/preserved EF 55% by Echo dated Dec 2019 w/ LVH and grade 1 diastolic dysfunction. No evidence of acute decompensation. Continue current medical mgt. HypoNatremia - likely pseudohyponatremia 2nd to elevated FSBS along w/ hypovolemia. Follow serial labs on IVF. Reviewed and documented as above. HyperKalemia - etiology clinically unable to determine. Follow serial labs. Reviewed and documented as above.     S/p fall and subjective feeling of bilateral lower extremity weakness-secondary to above there is no apparent injuries     Chronic compression fracture of T12 and T11      COPD - w/out chronic respiratory failure on no baseline home O2. Controlled on home medication regimen - continued. Obesity -  With Body mass index is 37.8 kg/m². Complicating assessment and treatment. Placing patient at risk for multiple co-morbidities as well as early death and contributing to the patient's presentation. Counseled on weight loss. ZAINAB - likely 2nd to obesity. Controlled on home CPAP/BiPap - continued, w/ outpatient f/u as previously arranged.          DVT Prophylaxis: SQ Heparin  Diet: DIET GENERAL;  Code Status: Full Code      PT/OT Eval Status: not yet ordered. Dispo - OK out of ICU AM 26 April, likely to home Monday 27 April pending continued clinical improvement.      Chris Parks MD

## 2020-04-26 NOTE — CONSULTS
current facility-administered medications on file prior to encounter. Current Outpatient Medications on File Prior to Encounter   Medication Sig Dispense Refill    oxyCODONE-acetaminophen (PERCOCET) 7.5-325 MG per tablet Take 1 tablet by mouth every 4 hours as needed for Pain. Migue DENNIS Complex-C-Folic Acid (VIRT-CAPS) 1 MG CAPS TK ONE C PO  QD 90 capsule 1    predniSONE (DELTASONE) 10 MG tablet Take 5 tablets by mouth daily for 3 days, THEN 4 tablets daily for 3 days, THEN 3 tablets daily for 3 days, THEN 3 tablets daily for 3 days, THEN 2 tablets daily for 3 days, THEN 1 tablet daily for 3 days. 54 tablet 0    QUEtiapine (SEROQUEL) 50 MG tablet Take 1 tablet by mouth every evening 30 tablet 5    DULoxetine (CYMBALTA) 30 MG extended release capsule TAKE 1 CAPSULE BY MOUTH EVERY DAY 90 capsule 10    nystatin-triamcinolone (MYCOLOG II) 533014-8.1 UNIT/GM-% cream Apply topically 2 times daily. 60 g 2    losartan (COZAAR) 50 MG tablet TAKE (1) TABLET BY MOUTH DAILY 90 tablet 10    pantoprazole (PROTONIX) 40 MG tablet TAKE (1) TABLET BY MOUTH EACH MORNING BEFORE BREAKFAST 90 tablet 10    albuterol (PROVENTIL) (2.5 MG/3ML) 0.083% nebulizer solution INHALE 1 VIAL VIA NEBULIZER EVERY 6 HOURS AS NEEDED FOR WHEEZING 300 mL 10    hydrALAZINE (APRESOLINE) 50 MG tablet Take 0.5 tablets by mouth every 8 hours 90 tablet 0    nystatin (MYCOSTATIN) 876289 UNIT/GM cream Apply topically 2 times daily.  60 g 3    insulin glargine (LANTUS) 100 UNIT/ML injection vial Inject 30 Units into the skin nightly 6 vial 1    insulin lispro (HUMALOG) 100 UNIT/ML injection vial Inject 10 Units into the skin 3 times daily (before meals) 1 vial 5    Insulin Syringe-Needle U-100 30G X 1/2\" 0.5 ML MISC 1 each by Does not apply route daily 100 each 3    insulin glargine (BASAGLAR KWIKPEN) 100 UNIT/ML injection pen Inject 30 Units into the skin nightly 15 pen 1    insulin aspart (NOVOLOG FLEXPEN) 100 UNIT/ML injection pen Inject 10 Not on file     Minutes per session: Not on file    Stress: Not on file   Relationships    Social connections     Talks on phone: Not on file     Gets together: Not on file     Attends Sabianism service: Not on file     Active member of club or organization: Not on file     Attends meetings of clubs or organizations: Not on file     Relationship status: Not on file    Intimate partner violence     Fear of current or ex partner: Not on file     Emotionally abused: Not on file     Physically abused: Not on file     Forced sexual activity: Not on file   Other Topics Concern    Not on file   Social History Narrative    Not on file       Family History:   Family History   Problem Relation Age of Onset    Diabetes Mother     Heart Disease Father     Kidney Disease Sister         stage 4-kidney failure    Cancer Sister     Heart Disease Sister     Obesity Sister     Cancer Sister     Heart Disease Sister     Obesity Sister     Alcohol Abuse Brother        Review of Systems:   Pertinent positives stated above in HPI. Remainder of 10 point review of systems were reviewed and were negative.     Physical exam:   Constitutional:  VITALS:  BP (!) 119/55   Pulse 58   Temp (!) 35.6 °F (2 °C)   Resp 12   Ht 5' 9\" (1.753 m)   Wt 256 lb (116.1 kg)   SpO2 91%   BMI 37.80 kg/m²   Gen: alert, awake, ill-appearing  Skin: no rash, turgor wnl  Heent:  eomi, mmm  Neck: no bruits or jvd noted, thyroid normal  Cardiovascular:  S1, S2 without m/r/g  Respiratory: CTA B without w/r/r; respiratory effort normal  Abdomen:  +bs, soft, nt, nd, no hepatosplenomegaly  Ext: + lower extremity edema  Access: L IJ TDC c/d/i  Psychiatric: mood and affect appropriate; judgement and insight intact  Musculoskeletal:  Rom, muscular strength limited; digits, nails normal    Data/  CBC:   Lab Results   Component Value Date    WBC 15.5 04/25/2020    RBC 3.59 04/25/2020    HGB 11.1 04/25/2020    HCT 34.2 04/25/2020    MCV 95.3 04/25/2020

## 2020-04-27 LAB
ALBUMIN SERPL-MCNC: 3.3 G/DL (ref 3.4–5)
ANION GAP SERPL CALCULATED.3IONS-SCNC: 17 MMOL/L (ref 3–16)
BUN BLDV-MCNC: 94 MG/DL (ref 7–20)
CALCIUM SERPL-MCNC: 8.5 MG/DL (ref 8.3–10.6)
CHLORIDE BLD-SCNC: 91 MMOL/L (ref 99–110)
CO2: 23 MMOL/L (ref 21–32)
CREAT SERPL-MCNC: 4.9 MG/DL (ref 0.9–1.3)
ESTIMATED AVERAGE GLUCOSE: 300.6 MG/DL
GFR AFRICAN AMERICAN: 15
GFR NON-AFRICAN AMERICAN: 13
GLUCOSE BLD-MCNC: 172 MG/DL (ref 70–99)
GLUCOSE BLD-MCNC: 203 MG/DL (ref 70–99)
GLUCOSE BLD-MCNC: 266 MG/DL (ref 70–99)
GLUCOSE BLD-MCNC: 324 MG/DL (ref 70–99)
GLUCOSE BLD-MCNC: 416 MG/DL (ref 70–99)
GLUCOSE BLD-MCNC: 445 MG/DL (ref 70–99)
GLUCOSE BLD-MCNC: 458 MG/DL (ref 70–99)
HBA1C MFR BLD: 12.1 %
HCT VFR BLD CALC: 33.9 % (ref 40.5–52.5)
HEMOGLOBIN: 11.2 G/DL (ref 13.5–17.5)
MCH RBC QN AUTO: 31 PG (ref 26–34)
MCHC RBC AUTO-ENTMCNC: 33.1 G/DL (ref 31–36)
MCV RBC AUTO: 93.9 FL (ref 80–100)
PDW BLD-RTO: 15.2 % (ref 12.4–15.4)
PERFORMED ON: ABNORMAL
PHOSPHORUS: 6.3 MG/DL (ref 2.5–4.9)
PLATELET # BLD: 204 K/UL (ref 135–450)
PMV BLD AUTO: 8.8 FL (ref 5–10.5)
POTASSIUM SERPL-SCNC: 4.3 MMOL/L (ref 3.5–5.1)
RBC # BLD: 3.61 M/UL (ref 4.2–5.9)
SODIUM BLD-SCNC: 131 MMOL/L (ref 136–145)
WBC # BLD: 11.4 K/UL (ref 4–11)

## 2020-04-27 PROCEDURE — 6370000000 HC RX 637 (ALT 250 FOR IP): Performed by: INTERNAL MEDICINE

## 2020-04-27 PROCEDURE — 6360000002 HC RX W HCPCS: Performed by: INTERNAL MEDICINE

## 2020-04-27 PROCEDURE — 36415 COLL VENOUS BLD VENIPUNCTURE: CPT

## 2020-04-27 PROCEDURE — 94761 N-INVAS EAR/PLS OXIMETRY MLT: CPT

## 2020-04-27 PROCEDURE — 85027 COMPLETE CBC AUTOMATED: CPT

## 2020-04-27 PROCEDURE — 80069 RENAL FUNCTION PANEL: CPT

## 2020-04-27 PROCEDURE — 1200000000 HC SEMI PRIVATE

## 2020-04-27 PROCEDURE — 94660 CPAP INITIATION&MGMT: CPT

## 2020-04-27 PROCEDURE — 5A1D70Z PERFORMANCE OF URINARY FILTRATION, INTERMITTENT, LESS THAN 6 HOURS PER DAY: ICD-10-PCS | Performed by: INTERNAL MEDICINE

## 2020-04-27 PROCEDURE — 2700000000 HC OXYGEN THERAPY PER DAY

## 2020-04-27 PROCEDURE — 90935 HEMODIALYSIS ONE EVALUATION: CPT

## 2020-04-27 PROCEDURE — 94640 AIRWAY INHALATION TREATMENT: CPT

## 2020-04-27 RX ORDER — INSULIN GLARGINE 100 [IU]/ML
30 INJECTION, SOLUTION SUBCUTANEOUS 2 TIMES DAILY
Status: DISCONTINUED | OUTPATIENT
Start: 2020-04-27 | End: 2020-04-29

## 2020-04-27 RX ORDER — HEPARIN SODIUM 1000 [USP'U]/ML
3950 INJECTION, SOLUTION INTRAVENOUS; SUBCUTANEOUS PRN
Status: DISCONTINUED | OUTPATIENT
Start: 2020-04-27 | End: 2020-05-06 | Stop reason: HOSPADM

## 2020-04-27 RX ORDER — HEPARIN SODIUM 1000 [USP'U]/ML
4000 INJECTION, SOLUTION INTRAVENOUS; SUBCUTANEOUS PRN
Status: DISCONTINUED | OUTPATIENT
Start: 2020-04-27 | End: 2020-05-06 | Stop reason: HOSPADM

## 2020-04-27 RX ORDER — FAMOTIDINE 20 MG/1
20 TABLET, FILM COATED ORAL DAILY
Status: DISCONTINUED | OUTPATIENT
Start: 2020-04-27 | End: 2020-05-06 | Stop reason: HOSPADM

## 2020-04-27 RX ORDER — PANTOPRAZOLE SODIUM 40 MG/1
40 TABLET, DELAYED RELEASE ORAL
Status: DISCONTINUED | OUTPATIENT
Start: 2020-04-27 | End: 2020-05-06 | Stop reason: HOSPADM

## 2020-04-27 RX ADMIN — OXYCODONE HYDROCHLORIDE AND ACETAMINOPHEN 1 TABLET: 7.5; 325 TABLET ORAL at 08:19

## 2020-04-27 RX ADMIN — HEPARIN SODIUM 5000 UNITS: 5000 INJECTION INTRAVENOUS; SUBCUTANEOUS at 06:10

## 2020-04-27 RX ADMIN — OXYCODONE HYDROCHLORIDE AND ACETAMINOPHEN 1 TABLET: 7.5; 325 TABLET ORAL at 22:26

## 2020-04-27 RX ADMIN — MELATONIN TAB 5 MG 5 MG: 5 TAB at 22:26

## 2020-04-27 RX ADMIN — MUPIROCIN: 20 OINTMENT TOPICAL at 20:34

## 2020-04-27 RX ADMIN — PRAVASTATIN SODIUM 80 MG: 80 TABLET ORAL at 14:12

## 2020-04-27 RX ADMIN — INSULIN LISPRO 3 UNITS: 100 INJECTION, SOLUTION INTRAVENOUS; SUBCUTANEOUS at 17:11

## 2020-04-27 RX ADMIN — LOSARTAN POTASSIUM 50 MG: 25 TABLET, FILM COATED ORAL at 14:12

## 2020-04-27 RX ADMIN — OXYCODONE HYDROCHLORIDE AND ACETAMINOPHEN 1 TABLET: 7.5; 325 TABLET ORAL at 18:16

## 2020-04-27 RX ADMIN — ASPIRIN 81 MG 81 MG: 81 TABLET ORAL at 14:12

## 2020-04-27 RX ADMIN — CALCIUM ACETATE 667 MG: 667 CAPSULE ORAL at 17:10

## 2020-04-27 RX ADMIN — HEPARIN SODIUM 4000 UNITS: 1000 INJECTION INTRAVENOUS; SUBCUTANEOUS at 11:00

## 2020-04-27 RX ADMIN — GLYCOPYRROLATE AND FORMOTEROL FUMARATE 2 PUFF: 9; 4.8 AEROSOL, METERED RESPIRATORY (INHALATION) at 07:57

## 2020-04-27 RX ADMIN — HYDRALAZINE HYDROCHLORIDE 25 MG: 25 TABLET, FILM COATED ORAL at 22:26

## 2020-04-27 RX ADMIN — CALCIUM ACETATE 667 MG: 667 CAPSULE ORAL at 14:12

## 2020-04-27 RX ADMIN — HEPARIN SODIUM 5000 UNITS: 5000 INJECTION INTRAVENOUS; SUBCUTANEOUS at 22:22

## 2020-04-27 RX ADMIN — PANTOPRAZOLE SODIUM 40 MG: 40 TABLET, DELAYED RELEASE ORAL at 14:11

## 2020-04-27 RX ADMIN — INSULIN GLARGINE 30 UNITS: 100 INJECTION, SOLUTION SUBCUTANEOUS at 22:21

## 2020-04-27 RX ADMIN — INSULIN LISPRO 10 UNITS: 100 INJECTION, SOLUTION INTRAVENOUS; SUBCUTANEOUS at 08:07

## 2020-04-27 RX ADMIN — OXYCODONE HYDROCHLORIDE AND ACETAMINOPHEN 1 TABLET: 7.5; 325 TABLET ORAL at 14:12

## 2020-04-27 RX ADMIN — GLYCOPYRROLATE AND FORMOTEROL FUMARATE 2 PUFF: 9; 4.8 AEROSOL, METERED RESPIRATORY (INHALATION) at 20:38

## 2020-04-27 RX ADMIN — INSULIN LISPRO 10 UNITS: 100 INJECTION, SOLUTION INTRAVENOUS; SUBCUTANEOUS at 14:16

## 2020-04-27 RX ADMIN — HEPARIN SODIUM 3900 UNITS: 1000 INJECTION INTRAVENOUS; SUBCUTANEOUS at 10:11

## 2020-04-27 RX ADMIN — INSULIN GLARGINE 30 UNITS: 100 INJECTION, SOLUTION SUBCUTANEOUS at 08:05

## 2020-04-27 RX ADMIN — INSULIN LISPRO 10 UNITS: 100 INJECTION, SOLUTION INTRAVENOUS; SUBCUTANEOUS at 17:13

## 2020-04-27 RX ADMIN — CLOPIDOGREL BISULFATE 75 MG: 75 TABLET ORAL at 14:13

## 2020-04-27 RX ADMIN — INSULIN LISPRO 18 UNITS: 100 INJECTION, SOLUTION INTRAVENOUS; SUBCUTANEOUS at 08:04

## 2020-04-27 RX ADMIN — INSULIN LISPRO 6 UNITS: 100 INJECTION, SOLUTION INTRAVENOUS; SUBCUTANEOUS at 22:21

## 2020-04-27 RX ADMIN — INSULIN LISPRO 6 UNITS: 100 INJECTION, SOLUTION INTRAVENOUS; SUBCUTANEOUS at 04:23

## 2020-04-27 RX ADMIN — HEPARIN SODIUM 5000 UNITS: 5000 INJECTION INTRAVENOUS; SUBCUTANEOUS at 14:14

## 2020-04-27 RX ADMIN — FAMOTIDINE 20 MG: 20 TABLET, FILM COATED ORAL at 18:53

## 2020-04-27 RX ADMIN — OXYCODONE HYDROCHLORIDE AND ACETAMINOPHEN 1 TABLET: 7.5; 325 TABLET ORAL at 04:22

## 2020-04-27 RX ADMIN — INSULIN LISPRO 6 UNITS: 100 INJECTION, SOLUTION INTRAVENOUS; SUBCUTANEOUS at 14:10

## 2020-04-27 RX ADMIN — QUETIAPINE FUMARATE 50 MG: 25 TABLET ORAL at 17:10

## 2020-04-27 ASSESSMENT — PAIN DESCRIPTION - LOCATION
LOCATION: BACK
LOCATION: HIP;LEG
LOCATION: BACK

## 2020-04-27 ASSESSMENT — PAIN SCALES - GENERAL
PAINLEVEL_OUTOF10: 4
PAINLEVEL_OUTOF10: 8
PAINLEVEL_OUTOF10: 6
PAINLEVEL_OUTOF10: 5
PAINLEVEL_OUTOF10: 5
PAINLEVEL_OUTOF10: 9
PAINLEVEL_OUTOF10: 5
PAINLEVEL_OUTOF10: 8

## 2020-04-27 ASSESSMENT — PAIN DESCRIPTION - PAIN TYPE
TYPE: CHRONIC PAIN
TYPE: ACUTE PAIN
TYPE: CHRONIC PAIN

## 2020-04-27 ASSESSMENT — PAIN DESCRIPTION - ORIENTATION
ORIENTATION: LOWER
ORIENTATION: LOWER

## 2020-04-27 ASSESSMENT — PAIN DESCRIPTION - FREQUENCY: FREQUENCY: CONTINUOUS

## 2020-04-27 NOTE — CARE COORDINATION
CASE MANAGEMENT INITIAL ASSESSMENT      Reviewed chart and completed assessment via telephone with: 56year old male  Explained Case Management role/services. Primary contact information: Jammie Francisco [phone]    Admit date/status: 4/25/20  Diagnosis: DKA  Is this a Readmission?:  No    Insurance: Asif Benitez  Precert required for SNF - yes   3 night stay required - no    Living arrangements, Adls, care needs, prior to admission: pt lives in a mobile home with his wife. Ramped entry. Transportation: private    [address] [place] [address] at home: Walker_X-rollator_Cane__RTS__ BSC__Shower Chair__  02__ HHN_X_ CPAP_X_  BiPap__  Hospital Bed__ W/C___ Other__________    Services in the home and/or outpatient, prior to admission: monthly nurse from insurance [employer],     Dialysis Facility (if applicable)   · Name: [facility]   · Address:  · Dialysis Schedule: MWF  · Phone:  · Fax:    PT/OT recs: none    Hospital Exemption Notification (HEN): not initiated    Barriers to discharge: none    Plan/comments:   Spoke to patient regarding the above via phone. Per patient he has been working with his PCP to get a motorized wheelchair. This is still pending. Denies any additional needs.       ECOC on chart for MD signature Gwynne Harada, RN

## 2020-04-27 NOTE — PROGRESS NOTES
Perfect serve to Dr Arnav Montejo:  Patient requesting something for pain. Has Percocet ordered but states he needs something else for his L left leg pain radiating from groin down. Also has Seroquel on board, but per dayshift held due to QTc. Can I have something for sleep for him.  Thanks\"

## 2020-04-27 NOTE — PROGRESS NOTES
Treatment time: 195    Net UF: 3000    Pre weight: 117.9  Post weight: 115  EDW: 115    Access used: L CVC  Access function: qwll no issues    Medications or blood products given: none    Regular outpatient schedule: MWF    Summary of response to treatment: tolerated treatment well      Copy of dialysis treatment record placed in chart, to be scanned into EMR.

## 2020-04-27 NOTE — PROGRESS NOTES
gallops. Abdomen: Soft, non-tender, non-distended with normal bowel sounds. Musculoskeletal: No clubbing, cyanosis or edema bilaterally. Full range of motion without deformity. Skin: Skin color, texture, turgor normal.  No rashes or lesions. Neurologic:  Neurovascularly intact without any focal sensory/motor deficits. Cranial nerves: II-XII intact, grossly non-focal.  Psychiatric: Alert and oriented, thought content appropriate, normal insight  Capillary Refill: Brisk,< 3 seconds   Peripheral Pulses: +2 palpable, equal bilaterally       Labs:   Recent Labs     04/25/20  1238 04/27/20  0418   WBC 15.5* 11.4*   HGB 11.1* 11.2*   HCT 34.2* 33.9*    204     Recent Labs     04/26/20  0633 04/26/20  1016 04/27/20  0418    136 131*   K 4.0 4.3 4.3   CL 97* 96* 91*   CO2 24 24 23   BUN 84* 85* 94*   CREATININE 4.7* 4.6* 4.9*   CALCIUM 8.9 9.0 8.5   PHOS 5.9* 6.0* 6.3*     Recent Labs     04/25/20  1238   AST 16   ALT 15   BILITOT <0.2   ALKPHOS 95     Recent Labs     04/25/20  1238   INR 0.91     Recent Labs     04/25/20  1238   TROPONINI 0.02*       Urinalysis:      Lab Results   Component Value Date    NITRU Negative 04/25/2020    WBCUA 3-5 04/25/2020    BACTERIA 1+ 03/10/2020    RBCUA None seen 04/25/2020    BLOODU Negative 04/25/2020    SPECGRAV 1.020 04/25/2020    GLUCOSEU >=1000 04/25/2020       Consults:    IP CONSULT TO HOSPITALIST  IP CONSULT TO NEPHROLOGY      Assessment/Plan:    Active Hospital Problems    Diagnosis    S/p TAVR (transcatheter aortic valve replacement), bioprosthetic [Z95.3]     Priority: Medium    DKA, type 1, not at goal ([address] [E10.10]    Hypercholesteremia [E78.00]    End stage renal disease on dialysis ([facility] [N18.6, Z99.2]    HTN (hypertension), benign [I10]    ZAINAB (obstructive sleep apnea) [G47.33]    Obesity (BMI 30-39. 9) [E66.9]    DM [facility]diabetes mellitus[facility] [E11.9]       DKA - admitted to ICU on IV insulin, now off and transitioned to Lantus/FSBS/SSI.   Titrated

## 2020-04-28 ENCOUNTER — APPOINTMENT (OUTPATIENT)
Dept: GENERAL RADIOLOGY | Age: 50
DRG: 637 | End: 2020-04-28
Payer: COMMERCIAL

## 2020-04-28 LAB
ALBUMIN SERPL-MCNC: 3.2 G/DL (ref 3.4–5)
ANION GAP SERPL CALCULATED.3IONS-SCNC: 14 MMOL/L (ref 3–16)
BUN BLDV-MCNC: 55 MG/DL (ref 7–20)
CALCIUM SERPL-MCNC: 8.7 MG/DL (ref 8.3–10.6)
CHLORIDE BLD-SCNC: 96 MMOL/L (ref 99–110)
CO2: 20 MMOL/L (ref 21–32)
CREAT SERPL-MCNC: 3.6 MG/DL (ref 0.9–1.3)
GFR AFRICAN AMERICAN: 22
GFR NON-AFRICAN AMERICAN: 18
GLUCOSE BLD-MCNC: 218 MG/DL (ref 70–99)
GLUCOSE BLD-MCNC: 289 MG/DL (ref 70–99)
GLUCOSE BLD-MCNC: 302 MG/DL (ref 70–99)
GLUCOSE BLD-MCNC: 324 MG/DL (ref 70–99)
GLUCOSE BLD-MCNC: 74 MG/DL (ref 70–99)
GLUCOSE BLD-MCNC: 89 MG/DL (ref 70–99)
GLUCOSE BLD-MCNC: 97 MG/DL (ref 70–99)
HCT VFR BLD CALC: 32.7 % (ref 40.5–52.5)
HEMOGLOBIN: 11 G/DL (ref 13.5–17.5)
MCH RBC QN AUTO: 31.1 PG (ref 26–34)
MCHC RBC AUTO-ENTMCNC: 33.6 G/DL (ref 31–36)
MCV RBC AUTO: 92.6 FL (ref 80–100)
PDW BLD-RTO: 15 % (ref 12.4–15.4)
PERFORMED ON: ABNORMAL
PERFORMED ON: NORMAL
PHOSPHORUS: 5.1 MG/DL (ref 2.5–4.9)
PLATELET # BLD: 170 K/UL (ref 135–450)
PMV BLD AUTO: 8.2 FL (ref 5–10.5)
POTASSIUM SERPL-SCNC: 4.8 MMOL/L (ref 3.5–5.1)
RBC # BLD: 3.53 M/UL (ref 4.2–5.9)
SODIUM BLD-SCNC: 130 MMOL/L (ref 136–145)
WBC # BLD: 10.5 K/UL (ref 4–11)

## 2020-04-28 PROCEDURE — 6360000002 HC RX W HCPCS: Performed by: INTERNAL MEDICINE

## 2020-04-28 PROCEDURE — 6370000000 HC RX 637 (ALT 250 FOR IP): Performed by: INTERNAL MEDICINE

## 2020-04-28 PROCEDURE — 1200000000 HC SEMI PRIVATE

## 2020-04-28 PROCEDURE — 85027 COMPLETE CBC AUTOMATED: CPT

## 2020-04-28 PROCEDURE — 73521 X-RAY EXAM HIPS BI 2 VIEWS: CPT

## 2020-04-28 PROCEDURE — 73552 X-RAY EXAM OF FEMUR 2/>: CPT

## 2020-04-28 PROCEDURE — 6360000002 HC RX W HCPCS

## 2020-04-28 PROCEDURE — 80069 RENAL FUNCTION PANEL: CPT

## 2020-04-28 PROCEDURE — 94660 CPAP INITIATION&MGMT: CPT

## 2020-04-28 RX ORDER — HYDROMORPHONE HCL 110MG/55ML
1 PATIENT CONTROLLED ANALGESIA SYRINGE INTRAVENOUS ONCE
Status: COMPLETED | OUTPATIENT
Start: 2020-04-28 | End: 2020-04-28

## 2020-04-28 RX ORDER — CYCLOBENZAPRINE HCL 10 MG
10 TABLET ORAL 3 TIMES DAILY PRN
Status: DISCONTINUED | OUTPATIENT
Start: 2020-04-28 | End: 2020-05-06 | Stop reason: HOSPADM

## 2020-04-28 RX ORDER — PROMETHAZINE HYDROCHLORIDE 25 MG/ML
25 INJECTION, SOLUTION INTRAMUSCULAR; INTRAVENOUS EVERY 4 HOURS PRN
Status: DISCONTINUED | OUTPATIENT
Start: 2020-04-28 | End: 2020-05-06 | Stop reason: HOSPADM

## 2020-04-28 RX ORDER — HYDROMORPHONE HCL 110MG/55ML
PATIENT CONTROLLED ANALGESIA SYRINGE INTRAVENOUS
Status: COMPLETED
Start: 2020-04-28 | End: 2020-04-28

## 2020-04-28 RX ORDER — HYDROMORPHONE HCL 110MG/55ML
1 PATIENT CONTROLLED ANALGESIA SYRINGE INTRAVENOUS EVERY 4 HOURS PRN
Status: DISCONTINUED | OUTPATIENT
Start: 2020-04-28 | End: 2020-05-02

## 2020-04-28 RX ORDER — SODIUM CHLORIDE 9 MG/ML
INJECTION, SOLUTION INTRAVENOUS CONTINUOUS
Status: DISCONTINUED | OUTPATIENT
Start: 2020-04-28 | End: 2020-04-28

## 2020-04-28 RX ORDER — INSULIN GLARGINE 100 [IU]/ML
30 INJECTION, SOLUTION SUBCUTANEOUS 2 TIMES DAILY
Qty: 18 ML | Refills: 0 | Status: SHIPPED | OUTPATIENT
Start: 2020-04-28 | End: 2020-06-01

## 2020-04-28 RX ADMIN — Medication 1 MG: at 13:37

## 2020-04-28 RX ADMIN — OXYCODONE HYDROCHLORIDE AND ACETAMINOPHEN 1 TABLET: 7.5; 325 TABLET ORAL at 07:01

## 2020-04-28 RX ADMIN — FAMOTIDINE 20 MG: 20 TABLET, FILM COATED ORAL at 09:20

## 2020-04-28 RX ADMIN — HEPARIN SODIUM 5000 UNITS: 5000 INJECTION INTRAVENOUS; SUBCUTANEOUS at 05:45

## 2020-04-28 RX ADMIN — HEPARIN SODIUM 5000 UNITS: 5000 INJECTION INTRAVENOUS; SUBCUTANEOUS at 15:28

## 2020-04-28 RX ADMIN — INSULIN LISPRO 10 UNITS: 100 INJECTION, SOLUTION INTRAVENOUS; SUBCUTANEOUS at 13:23

## 2020-04-28 RX ADMIN — CALCIUM ACETATE 667 MG: 667 CAPSULE ORAL at 18:15

## 2020-04-28 RX ADMIN — INSULIN LISPRO 12 UNITS: 100 INJECTION, SOLUTION INTRAVENOUS; SUBCUTANEOUS at 09:21

## 2020-04-28 RX ADMIN — ASPIRIN 81 MG 81 MG: 81 TABLET ORAL at 09:21

## 2020-04-28 RX ADMIN — LOSARTAN POTASSIUM 50 MG: 25 TABLET, FILM COATED ORAL at 09:20

## 2020-04-28 RX ADMIN — OXYCODONE HYDROCHLORIDE AND ACETAMINOPHEN 1 TABLET: 7.5; 325 TABLET ORAL at 20:34

## 2020-04-28 RX ADMIN — HYDRALAZINE HYDROCHLORIDE 25 MG: 25 TABLET, FILM COATED ORAL at 15:29

## 2020-04-28 RX ADMIN — INSULIN LISPRO 10 UNITS: 100 INJECTION, SOLUTION INTRAVENOUS; SUBCUTANEOUS at 09:41

## 2020-04-28 RX ADMIN — OXYCODONE HYDROCHLORIDE AND ACETAMINOPHEN 1 TABLET: 7.5; 325 TABLET ORAL at 03:00

## 2020-04-28 RX ADMIN — HYDROMORPHONE HYDROCHLORIDE 1 MG: 2 INJECTION, SOLUTION INTRAMUSCULAR; INTRAVENOUS; SUBCUTANEOUS at 14:15

## 2020-04-28 RX ADMIN — INSULIN LISPRO 12 UNITS: 100 INJECTION, SOLUTION INTRAVENOUS; SUBCUTANEOUS at 13:29

## 2020-04-28 RX ADMIN — HYDRALAZINE HYDROCHLORIDE 25 MG: 25 TABLET, FILM COATED ORAL at 22:02

## 2020-04-28 RX ADMIN — CALCIUM ACETATE 667 MG: 667 CAPSULE ORAL at 13:37

## 2020-04-28 RX ADMIN — INSULIN GLARGINE 30 UNITS: 100 INJECTION, SOLUTION SUBCUTANEOUS at 22:02

## 2020-04-28 RX ADMIN — CALCIUM ACETATE 667 MG: 667 CAPSULE ORAL at 09:21

## 2020-04-28 RX ADMIN — OXYCODONE HYDROCHLORIDE AND ACETAMINOPHEN 1 TABLET: 7.5; 325 TABLET ORAL at 10:57

## 2020-04-28 RX ADMIN — METOPROLOL SUCCINATE 25 MG: 25 TABLET, EXTENDED RELEASE ORAL at 09:21

## 2020-04-28 RX ADMIN — HYDRALAZINE HYDROCHLORIDE 25 MG: 25 TABLET, FILM COATED ORAL at 05:45

## 2020-04-28 RX ADMIN — CYCLOBENZAPRINE HYDROCHLORIDE 10 MG: 10 TABLET, FILM COATED ORAL at 15:29

## 2020-04-28 RX ADMIN — PROMETHAZINE HYDROCHLORIDE 25 MG: 25 INJECTION INTRAMUSCULAR; INTRAVENOUS at 15:28

## 2020-04-28 RX ADMIN — HYDROMORPHONE HYDROCHLORIDE 1 MG: 2 INJECTION, SOLUTION INTRAMUSCULAR; INTRAVENOUS; SUBCUTANEOUS at 18:15

## 2020-04-28 RX ADMIN — PANTOPRAZOLE SODIUM 40 MG: 40 TABLET, DELAYED RELEASE ORAL at 05:45

## 2020-04-28 RX ADMIN — HYDROMORPHONE HYDROCHLORIDE 1 MG: 2 INJECTION, SOLUTION INTRAMUSCULAR; INTRAVENOUS; SUBCUTANEOUS at 22:18

## 2020-04-28 RX ADMIN — INSULIN GLARGINE 30 UNITS: 100 INJECTION, SOLUTION SUBCUTANEOUS at 10:56

## 2020-04-28 RX ADMIN — INSULIN LISPRO 3 UNITS: 100 INJECTION, SOLUTION INTRAVENOUS; SUBCUTANEOUS at 22:02

## 2020-04-28 RX ADMIN — CLOPIDOGREL BISULFATE 75 MG: 75 TABLET ORAL at 09:21

## 2020-04-28 RX ADMIN — PRAVASTATIN SODIUM 80 MG: 80 TABLET ORAL at 09:21

## 2020-04-28 RX ADMIN — HEPARIN SODIUM 5000 UNITS: 5000 INJECTION INTRAVENOUS; SUBCUTANEOUS at 22:01

## 2020-04-28 RX ADMIN — HYDROMORPHONE HYDROCHLORIDE 1 MG: 2 INJECTION, SOLUTION INTRAMUSCULAR; INTRAVENOUS; SUBCUTANEOUS at 13:37

## 2020-04-28 RX ADMIN — QUETIAPINE FUMARATE 50 MG: 25 TABLET ORAL at 18:15

## 2020-04-28 ASSESSMENT — PAIN SCALES - GENERAL
PAINLEVEL_OUTOF10: 8
PAINLEVEL_OUTOF10: 6
PAINLEVEL_OUTOF10: 10
PAINLEVEL_OUTOF10: 10
PAINLEVEL_OUTOF10: 0
PAINLEVEL_OUTOF10: 8
PAINLEVEL_OUTOF10: 10
PAINLEVEL_OUTOF10: 8
PAINLEVEL_OUTOF10: 10

## 2020-04-28 ASSESSMENT — PAIN DESCRIPTION - DESCRIPTORS: DESCRIPTORS: SHARP;STABBING

## 2020-04-28 ASSESSMENT — PAIN DESCRIPTION - LOCATION: LOCATION: HIP;GROIN

## 2020-04-28 ASSESSMENT — PAIN DESCRIPTION - PAIN TYPE: TYPE: ACUTE PAIN

## 2020-04-28 ASSESSMENT — PAIN DESCRIPTION - FREQUENCY: FREQUENCY: CONTINUOUS

## 2020-04-28 ASSESSMENT — PAIN DESCRIPTION - ORIENTATION: ORIENTATION: RIGHT

## 2020-04-28 NOTE — PROGRESS NOTES
Hospitalist Progress Note      PCP: Sal Prieto MD    Date of Admission: 4/25/2020    Chief Complaint: Bilateral lower extremity weakness fall    Subjective: no new c/o. FSBS have remained high since transition off insulin gtt. Medications:  Reviewed    Infusion Medications    dextrose       Scheduled Medications    insulin glargine  30 Units Subcutaneous BID    insulin lispro  10 Units Subcutaneous TID WC    pantoprazole  40 mg Oral QAM AC    mupirocin   Nasal BID    famotidine  20 mg Oral Daily    insulin lispro  0-18 Units Subcutaneous TID WC    insulin lispro  0-9 Units Subcutaneous Nightly    calcium acetate  667 mg Oral TID WC    aspirin  81 mg Oral Daily    clopidogrel  75 mg Oral Daily    hydrALAZINE  25 mg Oral 3 times per day    metoprolol succinate  25 mg Oral Daily    pravastatin  80 mg Oral Daily    QUEtiapine  50 mg Oral QPM    glycopyrrolate-formoterol  2 puff Inhalation BID    heparin (porcine)  5,000 Units Subcutaneous 3 times per day    losartan  50 mg Oral Daily     PRN Meds: heparin (porcine), heparin (porcine), glucose, dextrose, glucagon (rDNA), dextrose, melatonin, oxyCODONE-acetaminophen      Intake/Output Summary (Last 24 hours) at 4/28/2020 1014  Last data filed at 4/28/2020 4037  Gross per 24 hour   Intake 2920 ml   Output 3685 ml   Net -765 ml       Physical Exam Performed:    BP (!) 141/83   Pulse 80   Temp 97.7 °F (36.5 °C) (Oral)   Resp 20   Ht 5' 9\" (1.753 m)   Wt 253 lb 8.5 oz (115 kg)   SpO2 98%   BMI 37.44 kg/m²     General appearance: No apparent distress, appears stated age and cooperative. HEENT: Pupils equal, round, and reactive to light. Conjunctivae/corneas clear. Neck: Supple, with full range of motion. No jugular venous distention. Trachea midline. Respiratory:  Normal respiratory effort. Clear to auscultation, bilaterally without Rales/Wheezes/Rhonchi.   Cardiovascular: Regular rate and rhythm with normal S1/S2 without murmurs, rubs or gallops. Abdomen: Soft, non-tender, non-distended with normal bowel sounds. Musculoskeletal: No clubbing, cyanosis or edema bilaterally. Full range of motion without deformity. Skin: Skin color, texture, turgor normal.  No rashes or lesions. Neurologic:  Neurovascularly intact without any focal sensory/motor deficits. Cranial nerves: II-XII intact, grossly non-focal.  Psychiatric: Alert and oriented, thought content appropriate, normal insight  Capillary Refill: Brisk,< 3 seconds   Peripheral Pulses: +2 palpable, equal bilaterally       Labs:   Recent Labs     04/25/20  1238 04/27/20  0418 04/28/20  0425   WBC 15.5* 11.4* 10.5   HGB 11.1* 11.2* 11.0*   HCT 34.2* 33.9* 32.7*    204 170     Recent Labs     04/26/20  1016 04/27/20  0418 04/28/20  0426    131* 130*   K 4.3 4.3 4.8   CL 96* 91* 96*   CO2 24 23 20*   BUN 85* 94* 55*   CREATININE 4.6* 4.9* 3.6*   CALCIUM 9.0 8.5 8.7   PHOS 6.0* 6.3* 5.1*     Recent Labs     04/25/20  1238   AST 16   ALT 15   BILITOT <0.2   ALKPHOS 95     Recent Labs     04/25/20  1238   INR 0.91     Recent Labs     04/25/20  1238   TROPONINI 0.02*       Urinalysis:      Lab Results   Component Value Date    NITRU Negative 04/25/2020    WBCUA 3-5 04/25/2020    BACTERIA 1+ 03/10/2020    RBCUA None seen 04/25/2020    BLOODU Negative 04/25/2020    SPECGRAV 1.020 04/25/2020    GLUCOSEU >=1000 04/25/2020       Consults:    IP CONSULT TO HOSPITALIST  IP CONSULT TO NEPHROLOGY      Assessment/Plan:    Active Hospital Problems    Diagnosis    S/p TAVR (transcatheter aortic valve replacement), bioprosthetic [Z95.3]     Priority: Medium    DKA, type 1, not at goal ([address] [E10.10]    Hypercholesteremia [E78.00]    End stage renal disease on dialysis ([address] [N18.6, Z99.2]    HTN (hypertension), benign [I10]    ZAINAB (obstructive sleep apnea) [G47.33]    Obesity (BMI 30-39. 9) [E66.9]    DM (diabetes mellitus) [facility] [E11.9]       DKA - admitted to ICU on IV insulin, now off

## 2020-04-28 NOTE — PROGRESS NOTES
mg Oral Daily    pravastatin  80 mg Oral Daily    QUEtiapine  50 mg Oral QPM    glycopyrrolate-formoterol  2 puff Inhalation BID    heparin (porcine)  5,000 Units Subcutaneous 3 times per day    losartan  50 mg Oral Daily         Labs:  Recent Labs     04/27/20 0418 04/28/20  0425   WBC 11.4* 10.5   HGB 11.2* 11.0*   HCT 33.9* 32.7*   MCV 93.9 92.6    170     Recent Labs     04/26/20  0315 04/26/20  0633 04/26/20  1016 04/27/20 0418 04/28/20  0426    137 136 131* 130*   K 4.0 4.0 4.3 4.3 4.8   CL 97* 97* 96* 91* 96*   CO2 24 24 24 23 20*   GLUCOSE 143* 100* 101* 445* 289*   PHOS 5.3* 5.9* 6.0* 6.3* 5.1*   MG 2.10 2.10 2.10  --   --    BUN 83* 84* 85* 94* 55*   CREATININE 4.5* 4.7* 4.6* 4.9* 3.6*   LABGLOM 14* 13* 13* 13* 18*   GFRAA 17* 16* 16* 15* 22*           Assessment/Plan:    - End stage renal disease - on HD Mon-Wed-Fri.     - DM - mgmt per Admitting team.     - Hypertension - controlled.     - Anemia of chronic disease - Hb at target. Possible discharge home today. Please do not hesitate to contact me at [phone] if with questions. Thank you!     Yon Martin MD  4/28/2020  [facility]

## 2020-04-28 NOTE — PROGRESS NOTES
04/27/20 2240   NIV Type   Skin Protection for O2 Device Yes   NIV Started/Stopped On   Equipment Type v60   Mode CPAP   Mask Type Full face mask   Mask Size Medium   Settings/Measurements   CPAP/EPAP 12 cmH2O   Resp 13   FiO2  25 %   Vt Exhaled 529 mL   Minute Volume 7.1 Liters   Mask Leak (lpm) 32 lpm   Comfort Level Good   Using Accessory Muscles No   SpO2 99   Alarm Settings   Alarms On Y   Press Low Alarm 6 cmH2O   High Pressure Alarm 30 cmH2O   Apnea (secs) 20 secs   Resp Rate Low Alarm 6   High Respiratory Rate 40 br/min

## 2020-04-28 NOTE — PROGRESS NOTES
Pt wife Haja Cardoza called. Pt found on floor and was calling out for help, Physician notifed and awaiting for orders. Pt giovanna lifted to bed. [place]

## 2020-04-28 NOTE — PLAN OF CARE
Problem: Falls - Risk of:  Goal: Will remain free from falls  Description: Will remain free from falls  4/28/2020 0601 by Jada Aguirre RN  Note: Pt has non-skid socks on and ambulates w/o staff assistance. 4/28/2020 0538 by Alfredo Sánchez RN  Outcome: Ongoing  Goal: Absence of physical injury  Description: Absence of physical injury  4/28/2020 0538 by Alfredo Sánchez RN  Outcome: Ongoing     Problem: Pain:  Goal: Pain level will decrease  Description: Pain level will decrease  4/28/2020 0601 by Jada Aguirre RN  Note: Pt follows w/ pain clinic outp/t and is receiving his PRN Perc per MD order. 4/28/2020 0538 by Alfredo Sánchez RN  Outcome: Ongoing  Goal: Control of chronic pain  Description: Control of chronic pain  4/28/2020 0538 by Alfredo Sánchez RN  Outcome: Ongoing     Problem: Serum Glucose Level - Abnormal:  Goal: Ability to maintain appropriate glucose levels will improve  Description: Ability to maintain appropriate glucose levels will improve  Note: Pt was educated on carb control diet and BS checks- no evidence of learning was observed. Problem: Sensory Perception - Impaired:  Goal: Ability to maintain a stable neurologic state will improve  Description: Ability to maintain a stable neurologic state will improve  Note: Pts diabetic neuropathy was discussed and he agreed to call for staff assistance if numbness occurs in BLE.

## 2020-04-29 LAB
ALBUMIN SERPL-MCNC: 3.4 G/DL (ref 3.4–5)
ANION GAP SERPL CALCULATED.3IONS-SCNC: 15 MMOL/L (ref 3–16)
BANDED NEUTROPHILS RELATIVE PERCENT: 3 % (ref 0–7)
BASOPHILS ABSOLUTE: 0.1 K/UL (ref 0–0.2)
BASOPHILS RELATIVE PERCENT: 1 %
BUN BLDV-MCNC: 65 MG/DL (ref 7–20)
CALCIUM SERPL-MCNC: 8.8 MG/DL (ref 8.3–10.6)
CHLORIDE BLD-SCNC: 88 MMOL/L (ref 99–110)
CO2: 23 MMOL/L (ref 21–32)
CREAT SERPL-MCNC: 4.5 MG/DL (ref 0.9–1.3)
EOSINOPHILS ABSOLUTE: 0.9 K/UL (ref 0–0.6)
EOSINOPHILS RELATIVE PERCENT: 7 %
GFR AFRICAN AMERICAN: 17
GFR NON-AFRICAN AMERICAN: 14
GLUCOSE BLD-MCNC: 140 MG/DL (ref 70–99)
GLUCOSE BLD-MCNC: 209 MG/DL (ref 70–99)
GLUCOSE BLD-MCNC: 221 MG/DL (ref 70–99)
GLUCOSE BLD-MCNC: 223 MG/DL (ref 70–99)
GLUCOSE BLD-MCNC: 224 MG/DL (ref 70–99)
GLUCOSE BLD-MCNC: 226 MG/DL (ref 70–99)
HCT VFR BLD CALC: 27.8 % (ref 40.5–52.5)
HEMOGLOBIN: 9.4 G/DL (ref 13.5–17.5)
LYMPHOCYTES ABSOLUTE: 1.6 K/UL (ref 1–5.1)
LYMPHOCYTES RELATIVE PERCENT: 12 %
MCH RBC QN AUTO: 31.1 PG (ref 26–34)
MCHC RBC AUTO-ENTMCNC: 33.7 G/DL (ref 31–36)
MCV RBC AUTO: 92.3 FL (ref 80–100)
MONOCYTES ABSOLUTE: 0.5 K/UL (ref 0–1.3)
MONOCYTES RELATIVE PERCENT: 4 %
MYELOCYTE PERCENT: 2 %
NEUTROPHILS ABSOLUTE: 10 K/UL (ref 1.7–7.7)
NEUTROPHILS RELATIVE PERCENT: 71 %
PDW BLD-RTO: 14.9 % (ref 12.4–15.4)
PERFORMED ON: ABNORMAL
PHOSPHORUS: 5.6 MG/DL (ref 2.5–4.9)
PLATELET # BLD: 152 K/UL (ref 135–450)
PLATELET SLIDE REVIEW: ADEQUATE
PMV BLD AUTO: 9.1 FL (ref 5–10.5)
POLYCHROMASIA: ABNORMAL
POTASSIUM SERPL-SCNC: 5.5 MMOL/L (ref 3.5–5.1)
RBC # BLD: 3.01 M/UL (ref 4.2–5.9)
SLIDE REVIEW: ABNORMAL
SODIUM BLD-SCNC: 126 MMOL/L (ref 136–145)
WBC # BLD: 13.2 K/UL (ref 4–11)

## 2020-04-29 PROCEDURE — 36415 COLL VENOUS BLD VENIPUNCTURE: CPT

## 2020-04-29 PROCEDURE — 6360000002 HC RX W HCPCS: Performed by: INTERNAL MEDICINE

## 2020-04-29 PROCEDURE — 6370000000 HC RX 637 (ALT 250 FOR IP): Performed by: INTERNAL MEDICINE

## 2020-04-29 PROCEDURE — 80069 RENAL FUNCTION PANEL: CPT

## 2020-04-29 PROCEDURE — 1200000000 HC SEMI PRIVATE

## 2020-04-29 PROCEDURE — 90935 HEMODIALYSIS ONE EVALUATION: CPT

## 2020-04-29 PROCEDURE — 85025 COMPLETE CBC W/AUTO DIFF WBC: CPT

## 2020-04-29 PROCEDURE — 97162 PT EVAL MOD COMPLEX 30 MIN: CPT

## 2020-04-29 PROCEDURE — 97530 THERAPEUTIC ACTIVITIES: CPT

## 2020-04-29 PROCEDURE — 97166 OT EVAL MOD COMPLEX 45 MIN: CPT

## 2020-04-29 RX ORDER — CALCIUM CARBONATE 200(500)MG
500 TABLET,CHEWABLE ORAL 3 TIMES DAILY PRN
Status: DISCONTINUED | OUTPATIENT
Start: 2020-04-29 | End: 2020-05-06 | Stop reason: HOSPADM

## 2020-04-29 RX ORDER — INSULIN GLARGINE 100 [IU]/ML
25 INJECTION, SOLUTION SUBCUTANEOUS 2 TIMES DAILY
Status: DISCONTINUED | OUTPATIENT
Start: 2020-04-29 | End: 2020-04-30

## 2020-04-29 RX ADMIN — ANTACID TABLETS 500 MG: 500 TABLET, CHEWABLE ORAL at 18:12

## 2020-04-29 RX ADMIN — CYCLOBENZAPRINE HYDROCHLORIDE 10 MG: 10 TABLET, FILM COATED ORAL at 15:16

## 2020-04-29 RX ADMIN — OXYCODONE HYDROCHLORIDE AND ACETAMINOPHEN 1 TABLET: 7.5; 325 TABLET ORAL at 04:40

## 2020-04-29 RX ADMIN — HYDROMORPHONE HYDROCHLORIDE 1 MG: 2 INJECTION, SOLUTION INTRAMUSCULAR; INTRAVENOUS; SUBCUTANEOUS at 09:43

## 2020-04-29 RX ADMIN — HYDRALAZINE HYDROCHLORIDE 25 MG: 25 TABLET, FILM COATED ORAL at 15:16

## 2020-04-29 RX ADMIN — INSULIN LISPRO 10 UNITS: 100 INJECTION, SOLUTION INTRAVENOUS; SUBCUTANEOUS at 17:37

## 2020-04-29 RX ADMIN — PRAVASTATIN SODIUM 80 MG: 80 TABLET ORAL at 11:18

## 2020-04-29 RX ADMIN — OXYCODONE HYDROCHLORIDE AND ACETAMINOPHEN 1 TABLET: 7.5; 325 TABLET ORAL at 22:01

## 2020-04-29 RX ADMIN — CYCLOBENZAPRINE HYDROCHLORIDE 10 MG: 10 TABLET, FILM COATED ORAL at 20:24

## 2020-04-29 RX ADMIN — HYDROMORPHONE HYDROCHLORIDE 1 MG: 2 INJECTION, SOLUTION INTRAMUSCULAR; INTRAVENOUS; SUBCUTANEOUS at 15:17

## 2020-04-29 RX ADMIN — HYDRALAZINE HYDROCHLORIDE 25 MG: 25 TABLET, FILM COATED ORAL at 20:24

## 2020-04-29 RX ADMIN — INSULIN LISPRO 2 UNITS: 100 INJECTION, SOLUTION INTRAVENOUS; SUBCUTANEOUS at 20:36

## 2020-04-29 RX ADMIN — INSULIN LISPRO 2 UNITS: 100 INJECTION, SOLUTION INTRAVENOUS; SUBCUTANEOUS at 17:37

## 2020-04-29 RX ADMIN — HYDRALAZINE HYDROCHLORIDE 25 MG: 25 TABLET, FILM COATED ORAL at 05:17

## 2020-04-29 RX ADMIN — MELATONIN TAB 5 MG 5 MG: 5 TAB at 22:01

## 2020-04-29 RX ADMIN — FAMOTIDINE 20 MG: 20 TABLET, FILM COATED ORAL at 11:18

## 2020-04-29 RX ADMIN — CALCIUM ACETATE 667 MG: 667 CAPSULE ORAL at 11:18

## 2020-04-29 RX ADMIN — OXYCODONE HYDROCHLORIDE AND ACETAMINOPHEN 1 TABLET: 7.5; 325 TABLET ORAL at 00:36

## 2020-04-29 RX ADMIN — CYCLOBENZAPRINE HYDROCHLORIDE 10 MG: 10 TABLET, FILM COATED ORAL at 01:48

## 2020-04-29 RX ADMIN — CALCIUM ACETATE 667 MG: 667 CAPSULE ORAL at 17:35

## 2020-04-29 RX ADMIN — INSULIN GLARGINE 30 UNITS: 100 INJECTION, SOLUTION SUBCUTANEOUS at 09:45

## 2020-04-29 RX ADMIN — CLOPIDOGREL BISULFATE 75 MG: 75 TABLET ORAL at 11:18

## 2020-04-29 RX ADMIN — HYDROMORPHONE HYDROCHLORIDE 1 MG: 2 INJECTION, SOLUTION INTRAMUSCULAR; INTRAVENOUS; SUBCUTANEOUS at 20:24

## 2020-04-29 RX ADMIN — INSULIN LISPRO 4 UNITS: 100 INJECTION, SOLUTION INTRAVENOUS; SUBCUTANEOUS at 11:22

## 2020-04-29 RX ADMIN — INSULIN GLARGINE 25 UNITS: 100 INJECTION, SOLUTION SUBCUTANEOUS at 20:37

## 2020-04-29 RX ADMIN — HEPARIN SODIUM 5000 UNITS: 5000 INJECTION INTRAVENOUS; SUBCUTANEOUS at 20:24

## 2020-04-29 RX ADMIN — PANTOPRAZOLE SODIUM 40 MG: 40 TABLET, DELAYED RELEASE ORAL at 05:17

## 2020-04-29 RX ADMIN — METOPROLOL SUCCINATE 25 MG: 25 TABLET, EXTENDED RELEASE ORAL at 11:17

## 2020-04-29 RX ADMIN — OXYCODONE HYDROCHLORIDE AND ACETAMINOPHEN 1 TABLET: 7.5; 325 TABLET ORAL at 17:35

## 2020-04-29 RX ADMIN — INSULIN LISPRO 10 UNITS: 100 INJECTION, SOLUTION INTRAVENOUS; SUBCUTANEOUS at 09:45

## 2020-04-29 RX ADMIN — HYDROMORPHONE HYDROCHLORIDE 1 MG: 2 INJECTION, SOLUTION INTRAMUSCULAR; INTRAVENOUS; SUBCUTANEOUS at 03:48

## 2020-04-29 RX ADMIN — HEPARIN SODIUM 5000 UNITS: 5000 INJECTION INTRAVENOUS; SUBCUTANEOUS at 05:17

## 2020-04-29 RX ADMIN — HEPARIN SODIUM 5000 UNITS: 5000 INJECTION INTRAVENOUS; SUBCUTANEOUS at 15:17

## 2020-04-29 RX ADMIN — QUETIAPINE FUMARATE 50 MG: 25 TABLET ORAL at 17:35

## 2020-04-29 RX ADMIN — ASPIRIN 81 MG 81 MG: 81 TABLET ORAL at 11:18

## 2020-04-29 RX ADMIN — INSULIN LISPRO 6 UNITS: 100 INJECTION, SOLUTION INTRAVENOUS; SUBCUTANEOUS at 09:46

## 2020-04-29 RX ADMIN — INSULIN LISPRO 10 UNITS: 100 INJECTION, SOLUTION INTRAVENOUS; SUBCUTANEOUS at 11:22

## 2020-04-29 RX ADMIN — OXYCODONE HYDROCHLORIDE AND ACETAMINOPHEN 1 TABLET: 7.5; 325 TABLET ORAL at 11:21

## 2020-04-29 RX ADMIN — LOSARTAN POTASSIUM 50 MG: 25 TABLET, FILM COATED ORAL at 11:18

## 2020-04-29 ASSESSMENT — PAIN SCALES - GENERAL
PAINLEVEL_OUTOF10: 10
PAINLEVEL_OUTOF10: 10
PAINLEVEL_OUTOF10: 9
PAINLEVEL_OUTOF10: 10
PAINLEVEL_OUTOF10: 10
PAINLEVEL_OUTOF10: 8
PAINLEVEL_OUTOF10: 10

## 2020-04-29 ASSESSMENT — PAIN DESCRIPTION - FREQUENCY: FREQUENCY: INTERMITTENT

## 2020-04-29 ASSESSMENT — PAIN DESCRIPTION - DESCRIPTORS: DESCRIPTORS: SHARP;STABBING

## 2020-04-29 ASSESSMENT — PAIN DESCRIPTION - PAIN TYPE: TYPE: ACUTE PAIN

## 2020-04-29 ASSESSMENT — PAIN DESCRIPTION - ORIENTATION
ORIENTATION: POSTERIOR;RIGHT
ORIENTATION: RIGHT

## 2020-04-29 ASSESSMENT — PAIN DESCRIPTION - LOCATION
LOCATION: HIP;GROIN;LEG
LOCATION: HIP;GROIN;LEG

## 2020-04-29 NOTE — DISCHARGE INSTR - COC
Continuity of Care Form    Patient Name: Lanny Martinez   :  1970  MRN:  [id number]    Admit date:  2020  Discharge date:  2020    Code Status Order: Full Code   Advance Directives:     Admitting Physician:  Azalia Mcghee MD  PCP: Joy Ayoub MD    Discharging Nurse: [address] Unit/Room#: 0211/0211-01  Discharging Unit Phone Number: [phone]    Emergency Contact:   Extended Emergency Contact Information  Primary Emergency Contact: Crystal Ann  Address: [address] [facility] [facility] [facility] [address]  Phone: [phone]  Mobile Phone: [phone]  Relation: Spouse    Past Surgical History:  Past Surgical History:   Procedure Laterality Date    AORTIC VALVE REPLACEMENT N/A 10/15/2019    TRANSCATHETER AORTIC VALVE REPLACEMENT FEMORAL APPROACH performed by Ingrid Martel MD at [address] [place] [address] Right 2019    PERITONEAL DIALYSIS CATHETER REMOVAL performed by Get Iverson MD at [address] [place] [address]      WN    CORONARY ANGIOPLASTY WITH STENT PLACEMENT  05/26/15    CYST REMOVAL  2013    EXCISION CYSTS, NECK X2 AND ABDOMINAL benign    DIAGNOSTIC CARDIAC CATH LAB PROCEDURE      DIALYSIS FISTULA CREATION Left 10/30/2017    LEFT BRACHIAL CEPHALIC FISTULA    DIALYSIS FISTULA CREATION Left 3/27/2019    LIGATION  AV FISTULA performed by Jordyn Cole [facility] at [facility], [facility]      OTHER SURGICAL HISTORY  2017    laparoscopic cholecystectomy with intraoperative cholangiogram    OTHER SURGICAL HISTORY  2018    PORT PLACEMENT  - vas cath    OTHER SURGICAL HISTORY Bilateral 2018    laprascopic peritoneal dialysis catheter placement    OTHER SURGICAL HISTORY Right 2018    peritoneal dialysis port placed on right side of abdomen    OTHER SURGICAL HISTORY  2019    PTA/Stenting R External Iliac artery    OH Vt Exhaled 631 mL   Minute Volume 8.2 Liters   Mask Leak (lpm) 23 lpm   Comfort Level Good   Using Accessory Muscles No   SpO2 96   Breath Sounds   Right Upper Lobe Diminished   Right Middle Lobe Diminished   Right Lower Lobe Diminished   Left Upper Lobe Diminished   Left Lower Lobe Diminished   Alarm Settings   Alarms On Y   Press Low Alarm 6 cmH2O   High Pressure Alarm 30 cmH2O   Delay Alarm 20 sec(s)   Resp Rate Low Alarm 6   High Respiratory Rate 40 br/min                             Physician Certification: I certify the above information and transfer of Aysha You  is necessary for the continuing treatment of the diagnosis listed and that he requires [facility] for less 30 days.      Update Admission H&P: No change in H&P    PHYSICIAN SIGNATURE:  Electronically signed by Kamila Perez MD on 5/6/2020 at 9:59 AM

## 2020-04-29 NOTE — PROGRESS NOTES
Nephrology Progress Note   [email or web address]      This patient is a 44year old male whom we are following for ESRD. Subjective: The patient was seen and examined. Had HD this morning but did not finish the treatment because of pain in his R thigh. Gabe Snowball yesterday. Family History: No family at bedside  ROS: No nausea or vomiting      Vitals:  BP (!) 171/54   Pulse 87   Temp 98 °F (36.7 °C)   Resp 20   Ht 5' 9\" (1.753 m)   Wt 255 lb 11.7 oz (116 kg)   SpO2 97%   BMI 37.77 kg/m²   I/O last 3 completed shifts: In: 0121 [P.O.:1790]  Out: 250 [Urine:250]  I/O this shift: In: 800 [P.O.:400]  Out: 1996     Physical Exam:  Physical Exam  Vitals signs reviewed. Constitutional:       General: He is not in acute distress. Appearance: Normal appearance. HENT:      Head: Normocephalic and atraumatic. Mouth/Throat:      Mouth: Mucous membranes are moist.   Eyes:      General: No scleral icterus. Conjunctiva/sclera: Conjunctivae normal.   Cardiovascular:      Rate and Rhythm: Normal rate and regular rhythm. Heart sounds: No friction rub. Pulmonary:      Effort: Pulmonary effort is normal. No respiratory distress. Abdominal:      General: Bowel sounds are normal. There is no distension. Tenderness: There is no abdominal tenderness. Musculoskeletal:      Right lower leg: No edema. Left lower leg: No edema. Skin:     General: Skin is warm. Neurological:      Mental Status: He is alert.        Access: [facility] TDC      Medications:   insulin lispro  0-12 Units Subcutaneous TID WC    insulin lispro  0-6 Units Subcutaneous Nightly    insulin glargine  25 Units Subcutaneous BID    insulin lispro  10 Units Subcutaneous TID WC    pantoprazole  40 mg Oral QAM AC    mupirocin   Nasal BID    famotidine  20 mg Oral Daily    calcium acetate  667 mg Oral TID WC    aspirin  81 mg Oral Daily    clopidogrel  75 mg Oral Daily    hydrALAZINE  25 mg Oral 3 times per day    metoprolol

## 2020-04-29 NOTE — PROGRESS NOTES
Patient is alert and oriented and seen in dialysis since pt was taken off unit early this a.m.;  VSS;  Shift assessment completed; Bed locked and in lowest position. Pt requesting to stand at bedside with this RN, when he had already been told repeatedly by dialysis RN that he could not stand at bedside. Bedside table and call light within reach;  Pain meds given. Will continue to monitor.

## 2020-04-29 NOTE — CONSULTS
than most people    Sleep apnea     Uses CPAP    Stroke ([facility] [address]     7mm thalamic cva 2017 deficts left side, left side weakness    TIA (transient ischemic attack)     Unspecified diseases of blood and blood-forming organs      Past Surgical History:        Procedure Laterality Date    AORTIC VALVE REPLACEMENT N/A 10/15/2019    TRANSCATHETER AORTIC VALVE REPLACEMENT FEMORAL APPROACH performed by Keira Dennison MD at [address] Right 7/2/2019    PERITONEAL DIALYSIS CATHETER REMOVAL performed by Sarah Obrien MD at [facility] COLONOSCOPY  2/29/2015    WN    CORONARY ANGIOPLASTY WITH STENT PLACEMENT  05/26/15    CYST REMOVAL  08/14/2013    EXCISION CYSTS, NECK X2 AND ABDOMINAL benign    DIAGNOSTIC CARDIAC CATH LAB PROCEDURE      DIALYSIS FISTULA CREATION Left 10/30/2017    LEFT BRACHIAL CEPHALIC FISTULA    DIALYSIS FISTULA CREATION Left 3/27/2019    LIGATION  AV FISTULA performed by Saadia Delgado MD at [address], COLON, DIAGNOSTIC      OTHER SURGICAL HISTORY  02/01/2017    laparoscopic cholecystectomy with intraoperative cholangiogram    OTHER SURGICAL HISTORY  2018    PORT PLACEMENT  - vas cath    OTHER SURGICAL HISTORY Bilateral 06/26/2018    laprascopic peritoneal dialysis catheter placement    OTHER SURGICAL HISTORY Right 09/2018    peritoneal dialysis port placed on right side of abdomen    OTHER SURGICAL HISTORY  05/28/2019    PTA/Stenting R External Iliac artery    ID LAP INSERTION TUNNELED INTRAPERITONEAL CATHETER N/A 9/21/2018    LAPAROSCOPIC PERITONEAL DIALYSIS CATHETER REPLACEMENT performed by Sarah Obrien MD at [address] [place] [address]  01/06/2016    UPPER GASTROINTESTINAL ENDOSCOPY  01/29/2017    possible candida, otherwise normal appearing    VASCULAR SURGERY  aprx 2 years ago    2 stents placed, each side of groin         Medications Prior to Admission:   Prior to Historical Provider, MD   Glucose Blood (BLOOD GLUCOSE TEST STRIPS) STRP TEST 3-4 TIMES DAILY, AS DIRECTED 4/25/18   Angie Conte MD   Blood Glucose Monitoring Suppl ADAM USE AS DIRECTED. 4/25/18   Angie Conte MD   Alcohol Swabs PADS USE AS DIRECTED 4/25/18   Angie Conte MD   albuterol sulfate  (90 Base) MCG/ACT inhaler Inhale 2 puffs into the lungs every 6 hours as needed for Wheezing 11/8/17   Antonella Barroso MD   ipratropium-albuterol (DUONEB) 0.5-2.5 (3) MG/3ML SOLN nebulizer solution Inhale 3 mLs into the lungs every 6 hours as needed for Shortness of Breath 10/15/17   Aditi Estevez MD   Lancets MISC Test daily 5/25/17   Isabel Santiago MD   calcium carbonate (TUMS) 500 MG chewable tablet Take 1 tablet by mouth 3 times daily as needed for Heartburn. Historical Provider, MD   aspirin 81 MG chewable tablet Take 1 tablet by mouth daily. Patient taking differently: Take 81 mg by mouth daily Indications: stopped on 6/25 for surgery  5/14/13   Joseph Ndiaye MD       Allergies:  Morphine    Social History:    Tobacco:  reports that he has been smoking cigarettes. He has a 33.00 pack-year smoking history. He has never used smokeless tobacco.   Alcohol:  reports previous alcohol use. Illicit Drug: No  Family History:       Problem Relation Age of Onset    Diabetes Mother     Heart Disease Father     Kidney Disease Sister         stage 4-kidney failure    Cancer Sister     Heart Disease Sister     Obesity Sister     Cancer Sister     Heart Disease Sister     Obesity Sister     Alcohol Abuse Brother        REVIEW OF SYSTEMS:    CONSTITUTIONAL:  negative  MUSCULOSKELETAL:  positive for  pain  All other systems reviewed and negative    PHYSICAL EXAM:    awake, alert, cooperative, no apparent distress, and appears stated age  MUSCULOSKELETAL:  There is no redness, warmth, or swelling of the joints. Full range of motion noted.   Motor strength is 5 out of 5 all extremities bilaterally. Tone is normal.  Extremely tender R hamstring in proximal muscular junction, pain with resisted knee flexion and extension, no pain gentle ER/IR Hip, nml motor at foot    DATA:    CBC:   Recent Labs     04/27/20 0418 04/28/20 0425 04/29/20  0800   WBC 11.4* 10.5 13.2*   HGB 11.2* 11.0* 9.4*    170 152     BMP:    Recent Labs     04/27/20 0418 04/28/20  0426 04/29/20  0800   * 130* 126*   K 4.3 4.8 5.5*   CL 91* 96* 88*   CO2 23 20* 23   BUN 94* 55* 65*   CREATININE 4.9* 3.6* 4.5*   GLUCOSE 445* 289* 223*     INR: No results for input(s): INR in the last 72 hours. Radiology:   XR FEMUR RIGHT (MIN 2 VIEWS)   Final Result   1. No acute osseous abnormality of the pelvis or right femur identified. 2. Atherosclerotic disease. 3. Mild degenerative changes of the bilateral hips. XR HIP BILATERAL W AP PELVIS (2 VIEWS)   Final Result   1. No acute osseous abnormality of the pelvis or right femur identified. 2. Atherosclerotic disease. 3. Mild degenerative changes of the bilateral hips. MRI LUMBAR SPINE WO CONTRAST   Final Result   Lumbar spine visualized starting at the level of the inferior endplate of L99. No acute abnormality in the lumbar spine. Degenerative disc disease without spinal canal narrowing. Foraminal narrowing, moderate at right L5-S1. XR KNEE LEFT (1-2 VIEWS)   Final Result   1. No acute abnormality. CT LUMBAR SPINE TRAUMA RECONSTRUCTION   Final Result   1. No evidence of traumatic injury to the chest.  No acute pulmonary findings. 2. Atherosclerotic calcification in the aorta and coronary circulation. 3. No evidence of traumatic abdominal or pelvic visceral injury. 4. No evidence of traumatic injury to the lumbar spine. Stable mild   compression fractures of the superior endplate of B41 and Q77. CT CHEST ABDOMEN PELVIS WO CONTRAST   Final Result   1.  No evidence of traumatic injury to the chest.  No acute

## 2020-04-29 NOTE — PLAN OF CARE
Problem: Falls - Risk of:  Goal: Will remain free from falls  Description: Will remain free from falls  4/28/2020 2251 by Bryon Bonilla RN  Outcome: Ongoing  Note: Pt did have fall todau- Xrays show no severe injury but pt is in excruciating pain. Bed alarm is on and non-skid socks are on. Pt agrre to have staff assistance w/ ambulation. Will continue to monitor closely. 4/28/2020 1923 by Dax Harrison RN  Outcome: Ongoing  Goal: Absence of physical injury  Description: Absence of physical injury  4/28/2020 2251 by Bryon Bonilla RN  Outcome: Ongoing  4/28/2020 1923 by Dax Harrison RN  Outcome: Ongoing     Problem: Pain:  Goal: Pain level will decrease  Description: Pain level will decrease  4/28/2020 2251 by Bryon Bonilla RN  Outcome: Ongoing  Note: Pt is receiving his Percocet for his chronic back pain. He also rates his leg pain from his fall as a 10/10- PRN Dilaudid and Flexeril was given per Md order. 4/28/2020 1923 by Dax Harrison RN  Outcome: Ongoing  Goal: Control of acute pain  Description: Control of acute pain  4/28/2020 2251 by Bryon Bonilla RN  Outcome: Ongoing  4/28/2020 1923 by Dax Harrison RN  Outcome: Ongoing  Goal: Control of chronic pain  Description: Control of chronic pain  4/28/2020 2251 by Bryon Bonilla RN  Outcome: Ongoing  4/28/2020 1923 by Dax Harrison RN  Outcome: Ongoing     Problem: Serum Glucose Level - Abnormal:  Goal: Ability to maintain appropriate glucose levels will improve  Description: Ability to maintain appropriate glucose levels will improve  4/28/2020 2251 by Bryon Bonilla RN  Outcome: Ongoing  Note: Pt was instructed on gluocse/insulin regimen and agrees to be compliant w/ treatment.    4/28/2020 1923 by Dax Harrison RN  Outcome: Ongoing     Problem: Sensory Perception - Impaired:  Goal: Ability to maintain a stable neurologic state will improve  Description: Ability to maintain a stable neurologic state will

## 2020-04-29 NOTE — PROGRESS NOTES
rhythm with normal S1/S2 without murmurs, rubs or gallops. Abdomen: Soft, non-tender, non-distended with normal bowel sounds. Musculoskeletal: No clubbing, cyanosis or edema bilaterally. Full range of motion without deformity. Skin: Skin color, texture, turgor normal.  No rashes or lesions. Neurologic:  Neurovascularly intact without any focal sensory/motor deficits. Cranial nerves: II-XII intact, grossly non-focal.  Psychiatric: Alert and oriented, thought content appropriate, normal insight  Capillary Refill: Brisk,< 3 seconds   Peripheral Pulses: +2 palpable, equal bilaterally       Labs:   Recent Labs     04/27/20 0418 04/28/20  0425 04/29/20  0800   WBC 11.4* 10.5 13.2*   HGB 11.2* 11.0* 9.4*   HCT 33.9* 32.7* 27.8*    170 152     Recent Labs     04/27/20 0418 04/28/20  0426 04/29/20  0800   * 130* 126*   K 4.3 4.8 5.5*   CL 91* 96* 88*   CO2 23 20* 23   BUN 94* 55* 65*   CREATININE 4.9* 3.6* 4.5*   CALCIUM 8.5 8.7 8.8   PHOS 6.3* 5.1* 5.6*     No results for input(s): AST, ALT, BILIDIR, BILITOT, ALKPHOS in the last 72 hours. No results for input(s): INR in the last 72 hours. No results for input(s): Joanna Safer in the last 72 hours. Urinalysis:      Lab Results   Component Value Date    NITRU Negative 04/25/2020    WBCUA 3-5 04/25/2020    BACTERIA 1+ 03/10/2020    RBCUA None seen 04/25/2020    BLOODU Negative 04/25/2020    SPECGRAV 1.020 04/25/2020    GLUCOSEU >=1000 04/25/2020       Consults:    IP CONSULT TO HOSPITALIST  IP CONSULT TO NEPHROLOGY      Assessment/Plan:    Active Hospital Problems    Diagnosis    S/p TAVR (transcatheter aortic valve replacement), bioprosthetic [Z95.3]     Priority: Medium    DKA, type 1, not at goal ([address] [E10.10]    Hypercholesteremia [E78.00]    End stage renal disease on dialysis ([facility] [address] [N18.6, Z99.2]    HTN (hypertension), benign [I10]    ZAINAB (obstructive sleep apnea) [G47.33]    Obesity (BMI 30-39. 9) [E66.9]    DM (diabetes mellitus) ([facility] [address] [E11.9]       DKA - admitted to ICU on IV insulin, now off and transitioned to Lantus/FSBS/SSI. Titrated up 27 April w/ continued FSBS elevation - now improved and titrated down slightly 29 April. Recently stared on Medrol dose-carlos. Last HbA1c 12.1% this admission, indicative of poor baseline control.      ESRD - on hemodialysis M/W/F. Nephrology consulted and appreciated.      HTN/CAD - w/ known CAD but no evidence of active signs/sxs of ischemia/failure. Currently controlled on home meds w/ vitals reviewed and documented as above. CHF - chronic diastolic failure w/ reduced/preserved EF 55% by Echo dated Dec 2019 w/ LVH and grade 1 diastolic dysfunction. No evidence of acute decompensation. Continue current medical mgt. HypoNatremia - likely pseudohyponatremia 2nd to elevated FSBS along w/ hypovolemia. Follow serial labs on IVF. Reviewed and documented as above. HyperKalemia - etiology clinically unable to determine. Follow serial labs. Reviewed and documented as above.     S/P fall and subjective feeling of bilateral lower extremity weakness-secondary to above there is no apparent injuries     Chronic compression fracture of T12 and T11      COPD - w/out chronic respiratory failure on no baseline home O2. Controlled on home medication regimen - continued. Obesity -  With Body mass index is 41.51 kg/m². Complicating assessment and treatment. Placing patient at risk for multiple co-morbidities as well as early death and contributing to the patient's presentation. Counseled on weight loss. ZAINAB - likely 2nd to obesity. Controlled on home CPAP/BiPap - continued, w/ outpatient f/u as previously arranged.       Hip/Leg pain - s/p mechanical fall 28 April. Films negative for fx/dislocation. Pain med PRN.      DVT Prophylaxis: SQ Heparin  Diet: DIET GENERAL;  Code Status: Full Code      PT/OT Eval Status: not yet ordered. Dispo - OK out of ICU AM 26 April - order written.   Likely to home Wed 29 April after HD pending continued clinical improvement.      Juliet Pearson MD

## 2020-04-29 NOTE — PROGRESS NOTES
Pt stated he wanted to go home. Pt was unable to stand and bear weight on R foot r/t numbness, tingling and pain;  Pt stated he would stay and talk to the ortho doc. Pt repositioned; Will continue to monitor.

## 2020-04-29 NOTE — PROGRESS NOTES
obstructive pulmonary disease) ([address] Depression, Diabetes mellitus ([address] Difficult intravenous access, Emphysema of lung ([address] ESRD (end stage renal disease) on dialysis ([address] Fear of needles, Gastric ulcer, GERD (gastroesophageal reflux disease), Heart valve problem, Hemodialysis patient ([address] History of spinal fracture, Hx of blood clots, Hyperlipidemia, Hypertension, MI (myocardial infarction) ([address] Neuromuscular disorder ([address] Numbness and tingling in left arm, Pneumonia, PONV (postoperative nausea and vomiting), Prolonged emergence from general anesthesia, Sleep apnea, Stroke ([address] TIA (transient ischemic attack), and Unspecified diseases of blood and blood-forming organs. has a past surgical history that includes Tonsillectomy; cyst removal (08/14/2013); Colonoscopy; Coronary angioplasty with stent (05/26/15); vascular surgery (aprx 2 years ago); Colonoscopy (2/29/2015); Upper gastrointestinal endoscopy (01/06/2016); Upper gastrointestinal endoscopy (01/29/2017); other surgical history (02/01/2017); Dialysis fistula creation (Left, 10/30/2017); Diagnostic Cardiac Cath Lab Procedure; other surgical history (2018); other surgical history (Bilateral, 06/26/2018); pr lap insertion tunneled intraperitoneal catheter (N/A, 9/21/2018); other surgical history (Right, 09/2018); Dialysis fistula creation (Left, 3/27/2019); other surgical history (05/28/2019); Endoscopy, colon, diagnostic; Catheter Removal (Right, 7/2/2019); and Aortic valve replacement (N/A, 10/15/2019).            Restrictions  Restrictions/Precautions  Restrictions/Precautions: General Precautions, Fall Risk  Position Activity Restriction  Other position/activity restrictions: High fall risk per nursing assessment, up with assistance    Subjective   General  Chart Reviewed: Yes  Patient assessed for rehabilitation services?: Yes  Additional Pertinent Hx: fell getting into car from dialysis and fell again coming out of the bathroom on 4/28/20 Maximum assistance  Toileting: Modified independent (urinal)     Tone RUE  RUE Tone: Normotonic  Tone LUE  LUE Tone: Normotonic  Coordination  Movements Are Fluid And Coordinated: Yes     Bed mobility  Supine to Sit: Stand by assistance(to L with HOB elevated and use of bed rails)  Sit to Supine: Unable to assess(up to chair at end of session)     Transfers  Stand Step Transfers: 2 Person assistance; Moderate assistance(bed to chair to L with SW)  Sit to stand: 2 Person assistance; Moderate assistance(up to SW)  Stand to sit: 2 Person assistance; Moderate assistance     Cognition  Overall Cognitive Status: Exceptions  Safety Judgement: Decreased awareness of need for safety;Decreased awareness of need for assistance     LUE AROM (degrees)  LUE AROM : WFL  RUE AROM (degrees)  RUE AROM : WFL  LUE Strength  Gross LUE Strength: WFL  RUE Strength  Gross RUE Strength: WFL         Plan   Plan  Times per week: 3-5x's a week while in acute care      AM-PAC Score        AM-[facility] Inpatient Daily Activity Raw Score: 16 (04/29/20 1728)  AM-PAC Inpatient ADL T-Scale Score : 35.96 (04/29/20 1728)  ADL Inpatient CMS 0-100% Score: 53.32 (04/29/20 1728)  ADL Inpatient CMS G-Code Modifier : CK (04/29/20 1728)    Goals  Short term goals  Time Frame for Short term goals: 1 week unless otherwise specified 5/6  Short term goal 1: Pt will complete LE dressing with SBA  Short term goal 2: Pt will complete toilet with SBA by 5/4  Short term goal 3: Pt will complete standing level ADLs with SBA for balance  Patient Goals   Patient goals : \"to be able to walk without pain\"       Therapy Time   Individual Concurrent Group Co-treatment   Time In 1450         Time Out 1530         Minutes 40         Timed Code Treatment Minutes: 30 Minutes       SHANE Messina/АННА

## 2020-04-29 NOTE — PROGRESS NOTES
Physical Therapy    Facility/Department: [facility] A2 CARD TELEMETRY  Initial Assessment    NAME: Demetria Santana  : 1970  MRN: [id number]    Date of Service: 2020    Discharge Recommendations:  IP Rehab   PT Equipment Recommendations  Equipment Needed: No(TBD at rehab facility)    Assessment   Body structures, Functions, Activity limitations: Decreased functional mobility ; Decreased ROM; Decreased strength;Decreased balance; Increased pain;Decreased posture  Assessment: Pt referred for PT evaluation during current hospital stay following a fall with RLE grade II hamstring tear. Pt currently functioning well below his reported baseline, limited by severe RLE pain and requiring modA x 2 for sit<>stand and bed>chair transfers. Pt in too much pain/too weak in LE's to safely attempt amb today. Given pt's current deficits and young age, recommend IP rehab at D/C. Treatment Diagnosis: RLE pain s/p fall with R hamstring tear  Specific instructions for Next Treatment: Progress ther ex and mobility as tolerated, progress to amb with walker as able  Prognosis: Guarded;Good  Decision Making: Medium Complexity  PT Education: Goals;PT Role;Plan of Care;Precautions;Transfer Training;Functional Mobility Training;Disease Specific Education;Gait Training;General Safety; Injury Prevention;Home Exercise Program;Equipment(importance of RICE to help during acute injury phase)  Patient Education: Pt verbalizes understanding. Barriers to Learning: Pain  REQUIRES PT FOLLOW UP: Yes  Activity Tolerance: Patient limited by pain       Patient Diagnosis(es): The primary encounter diagnosis was Bilateral leg numbness. Diagnoses of Hyperglycemia, Acute left-sided low back pain with left-sided sciatica, Fall, initial encounter, Generalized weakness, DKA, type 2, not at goal [facility]), and Type 2 diabetes mellitus with diabetic nephropathy, with long-term current use of insulin ([facility] [address] were also pertinent to this visit.      has a past medical history of Ambulatory dysfunction, Aortic stenosis, Arthritis, Asthma, Bilateral hilar adenopathy syndrome, CAD (coronary artery disease), Cardiomyopathy ([address] CHF (congestive heart failure) ([address] Chronic pain, COPD (chronic obstructive pulmonary disease) ([address] Depression, Diabetes mellitus ([address] Difficult intravenous access, Emphysema of lung ([address] ESRD (end stage renal disease) on dialysis ([address] Fear of needles, Gastric ulcer, GERD (gastroesophageal reflux disease), Heart valve problem, Hemodialysis patient ([address] History of spinal fracture, Hx of blood clots, Hyperlipidemia, Hypertension, MI (myocardial infarction) ([address] Neuromuscular disorder ([address] Numbness and tingling in left arm, Pneumonia, PONV (postoperative nausea and vomiting), Prolonged emergence from general anesthesia, Sleep apnea, Stroke ([address] TIA (transient ischemic attack), and Unspecified diseases of blood and blood-forming organs. has a past surgical history that includes Tonsillectomy; cyst removal (08/14/2013); Colonoscopy; Coronary angioplasty with stent (05/26/15); vascular surgery (aprx 2 years ago); Colonoscopy (2/29/2015); Upper gastrointestinal endoscopy (01/06/2016); Upper gastrointestinal endoscopy (01/29/2017); other surgical history (02/01/2017); Dialysis fistula creation (Left, 10/30/2017); Diagnostic Cardiac Cath Lab Procedure; other surgical history (2018); other surgical history (Bilateral, 06/26/2018); pr lap insertion tunneled intraperitoneal catheter (N/A, 9/21/2018); other surgical history (Right, 09/2018); Dialysis fistula creation (Left, 3/27/2019); other surgical history (05/28/2019); Endoscopy, colon, diagnostic; Catheter Removal (Right, 7/2/2019); and Aortic valve replacement (N/A, 10/15/2019).     Restrictions  Restrictions/Precautions  Restrictions/Precautions: General Precautions, Fall Risk  Position Activity Restriction  Other position/activity restrictions: High fall risk per nursing assessment, up with Assistance: Independent  Active : Yes  Occupation: On disability  Type of occupation:   Leisure & Hobbies: watch TV    Objective  Observation/Palpation  Posture: Poor  Observation: Pt with kyphotic trunk and difficulty keeping trunk in midline due to severe RLE pain, located mainly on posterior aspect of leg near hamstring    RLE General AROM: WFL ankle, decreased 50-75% in hip & knee due to severe pain near hamstring and muscle guarding  LLE AROM : WFL  Strength RLE: Not formally strength tested due to severe pain following fall; appears at least 3/5 in ankle through observation, less than 3/5 in hip & knee  Strength LLE: Not formally strength tested due to pain; appears at least 3+/5 to 4-/5 throughout through observation     Bed mobility  Supine to Sit: Stand by assistance(moving toward L side, HOB elevated ~45 degrees, pt using L bedrail)  Scooting: Stand by assistance(to scoot forward to EOB)     Transfers  Sit to Stand: 2 Person Assistance(modA x 2 from EOB to std. walker)  Stand to sit: 2 Person Assistance(modA x 2 from std. walker to chair)  Bed to Chair: 2 Person Assistance(modA x 2 moving toward L side, using std. walker)     Ambulation  Ambulation?: No(pt in too much pain to safely attempt amb today)     Balance  Posture: Poor  Sitting - Static: Good;-  Sitting - Dynamic: Fair;-  Standing - Static: Fair;-  Standing - Dynamic: Poor    Plan   Times per week: 3-5x/week in acute care  Times per day: Daily  Specific instructions for Next Treatment: Progress ther ex and mobility as tolerated, progress to amb with walker as able  Current Treatment Recommendations: Strengthening, Gait Training, Balance Training, Functional Mobility Training, Transfer Training, Safety Education & Training, Patient/Caregiver Education & Training, Equipment Evaluation, Education, & procurement, Positioning, Home Exercise Program, Pain Management, ROM  Safety Devices:  All fall risk precautions in place, Left in chair,

## 2020-04-29 NOTE — CARE COORDINATION
Chart reviewed day 4. D/c aborted yesterday. Patient fell. xrays completed and pain. Lives in home with wife, has rollator. CPAP and HHN. Active with [facility]. Therapy eval would be helpful. Will continue to follow.  Eddie Troncoso RN

## 2020-04-30 LAB
ALBUMIN SERPL-MCNC: 3.5 G/DL (ref 3.4–5)
ANION GAP SERPL CALCULATED.3IONS-SCNC: 17 MMOL/L (ref 3–16)
BUN BLDV-MCNC: 48 MG/DL (ref 7–20)
CALCIUM SERPL-MCNC: 8.9 MG/DL (ref 8.3–10.6)
CHLORIDE BLD-SCNC: 88 MMOL/L (ref 99–110)
CO2: 20 MMOL/L (ref 21–32)
CREAT SERPL-MCNC: 3.8 MG/DL (ref 0.9–1.3)
GFR AFRICAN AMERICAN: 20
GFR NON-AFRICAN AMERICAN: 17
GLUCOSE BLD-MCNC: 107 MG/DL (ref 70–99)
GLUCOSE BLD-MCNC: 82 MG/DL (ref 70–99)
GLUCOSE BLD-MCNC: 83 MG/DL (ref 70–99)
GLUCOSE BLD-MCNC: 86 MG/DL (ref 70–99)
GLUCOSE BLD-MCNC: 90 MG/DL (ref 70–99)
GLUCOSE BLD-MCNC: 93 MG/DL (ref 70–99)
PERFORMED ON: ABNORMAL
PERFORMED ON: NORMAL
PHOSPHORUS: 5.4 MG/DL (ref 2.5–4.9)
POTASSIUM SERPL-SCNC: 5.1 MMOL/L (ref 3.5–5.1)
SODIUM BLD-SCNC: 125 MMOL/L (ref 136–145)

## 2020-04-30 PROCEDURE — 94660 CPAP INITIATION&MGMT: CPT

## 2020-04-30 PROCEDURE — 80069 RENAL FUNCTION PANEL: CPT

## 2020-04-30 PROCEDURE — 6370000000 HC RX 637 (ALT 250 FOR IP): Performed by: INTERNAL MEDICINE

## 2020-04-30 PROCEDURE — 6360000002 HC RX W HCPCS: Performed by: INTERNAL MEDICINE

## 2020-04-30 PROCEDURE — 94640 AIRWAY INHALATION TREATMENT: CPT

## 2020-04-30 PROCEDURE — 36415 COLL VENOUS BLD VENIPUNCTURE: CPT

## 2020-04-30 PROCEDURE — 1200000000 HC SEMI PRIVATE

## 2020-04-30 RX ORDER — INSULIN GLARGINE 100 [IU]/ML
20 INJECTION, SOLUTION SUBCUTANEOUS 2 TIMES DAILY
Status: DISCONTINUED | OUTPATIENT
Start: 2020-04-30 | End: 2020-05-06 | Stop reason: HOSPADM

## 2020-04-30 RX ADMIN — OXYCODONE HYDROCHLORIDE AND ACETAMINOPHEN 1 TABLET: 7.5; 325 TABLET ORAL at 04:13

## 2020-04-30 RX ADMIN — INSULIN GLARGINE 25 UNITS: 100 INJECTION, SOLUTION SUBCUTANEOUS at 09:33

## 2020-04-30 RX ADMIN — MELATONIN TAB 5 MG 5 MG: 5 TAB at 21:17

## 2020-04-30 RX ADMIN — INSULIN LISPRO 10 UNITS: 100 INJECTION, SOLUTION INTRAVENOUS; SUBCUTANEOUS at 09:35

## 2020-04-30 RX ADMIN — QUETIAPINE FUMARATE 50 MG: 25 TABLET ORAL at 17:49

## 2020-04-30 RX ADMIN — HYDROMORPHONE HYDROCHLORIDE 1 MG: 2 INJECTION, SOLUTION INTRAMUSCULAR; INTRAVENOUS; SUBCUTANEOUS at 16:40

## 2020-04-30 RX ADMIN — FAMOTIDINE 20 MG: 20 TABLET, FILM COATED ORAL at 09:22

## 2020-04-30 RX ADMIN — HYDRALAZINE HYDROCHLORIDE 25 MG: 25 TABLET, FILM COATED ORAL at 16:34

## 2020-04-30 RX ADMIN — PANTOPRAZOLE SODIUM 40 MG: 40 TABLET, DELAYED RELEASE ORAL at 09:31

## 2020-04-30 RX ADMIN — PRAVASTATIN SODIUM 80 MG: 80 TABLET ORAL at 09:22

## 2020-04-30 RX ADMIN — HYDROMORPHONE HYDROCHLORIDE 1 MG: 2 INJECTION, SOLUTION INTRAMUSCULAR; INTRAVENOUS; SUBCUTANEOUS at 23:09

## 2020-04-30 RX ADMIN — CLOPIDOGREL BISULFATE 75 MG: 75 TABLET ORAL at 09:21

## 2020-04-30 RX ADMIN — OXYCODONE HYDROCHLORIDE AND ACETAMINOPHEN 1 TABLET: 7.5; 325 TABLET ORAL at 09:22

## 2020-04-30 RX ADMIN — HYDROMORPHONE HYDROCHLORIDE 1 MG: 2 INJECTION, SOLUTION INTRAMUSCULAR; INTRAVENOUS; SUBCUTANEOUS at 05:08

## 2020-04-30 RX ADMIN — CALCIUM ACETATE 667 MG: 667 CAPSULE ORAL at 16:34

## 2020-04-30 RX ADMIN — HEPARIN SODIUM 5000 UNITS: 5000 INJECTION INTRAVENOUS; SUBCUTANEOUS at 16:34

## 2020-04-30 RX ADMIN — GLYCOPYRROLATE AND FORMOTEROL FUMARATE 2 PUFF: 9; 4.8 AEROSOL, METERED RESPIRATORY (INHALATION) at 08:18

## 2020-04-30 RX ADMIN — HEPARIN SODIUM 5000 UNITS: 5000 INJECTION INTRAVENOUS; SUBCUTANEOUS at 06:36

## 2020-04-30 RX ADMIN — HEPARIN SODIUM 5000 UNITS: 5000 INJECTION INTRAVENOUS; SUBCUTANEOUS at 21:15

## 2020-04-30 RX ADMIN — OXYCODONE HYDROCHLORIDE AND ACETAMINOPHEN 1 TABLET: 7.5; 325 TABLET ORAL at 17:52

## 2020-04-30 RX ADMIN — CYCLOBENZAPRINE HYDROCHLORIDE 10 MG: 10 TABLET, FILM COATED ORAL at 21:17

## 2020-04-30 RX ADMIN — INSULIN GLARGINE 20 UNITS: 100 INJECTION, SOLUTION SUBCUTANEOUS at 21:15

## 2020-04-30 RX ADMIN — METOPROLOL SUCCINATE 25 MG: 25 TABLET, EXTENDED RELEASE ORAL at 09:22

## 2020-04-30 RX ADMIN — CALCIUM ACETATE 667 MG: 667 CAPSULE ORAL at 09:22

## 2020-04-30 RX ADMIN — ASPIRIN 81 MG 81 MG: 81 TABLET ORAL at 09:22

## 2020-04-30 RX ADMIN — HYDRALAZINE HYDROCHLORIDE 25 MG: 25 TABLET, FILM COATED ORAL at 21:17

## 2020-04-30 RX ADMIN — LOSARTAN POTASSIUM 50 MG: 25 TABLET, FILM COATED ORAL at 09:22

## 2020-04-30 RX ADMIN — HYDRALAZINE HYDROCHLORIDE 25 MG: 25 TABLET, FILM COATED ORAL at 06:36

## 2020-04-30 ASSESSMENT — PAIN SCALES - GENERAL
PAINLEVEL_OUTOF10: 7
PAINLEVEL_OUTOF10: 9
PAINLEVEL_OUTOF10: 8
PAINLEVEL_OUTOF10: 10
PAINLEVEL_OUTOF10: 7

## 2020-04-30 ASSESSMENT — PAIN DESCRIPTION - LOCATION: LOCATION: LEG

## 2020-04-30 ASSESSMENT — PAIN DESCRIPTION - PAIN TYPE: TYPE: ACUTE PAIN

## 2020-04-30 ASSESSMENT — PAIN DESCRIPTION - ORIENTATION: ORIENTATION: RIGHT

## 2020-04-30 NOTE — PROGRESS NOTES
Nephrology Progress Note   [email or web address]      This patient is a 44year old male whom we are following for ESRD. Subjective: The patient was seen and examined. Still complaining of pain in his R thigh. Family History: No family at bedside  ROS: No nausea or vomiting      Vitals:  BP (!) 182/83   Pulse 87   Temp 98.9 °F (37.2 °C) (Oral)   Resp 18   Ht 5' 9\" (1.753 m)   Wt 264 lb 15.9 oz (120.2 kg)   SpO2 94%   BMI 39.13 kg/m²   I/O last 3 completed shifts: In: 1160 [P.O.:760]  Out: 2421 [Urine:425]  I/O this shift:  In: 360 [P.O.:360]  Out: -     Physical Exam:  Physical Exam  Vitals signs reviewed. Constitutional:       General: He is not in acute distress. Appearance: Normal appearance. HENT:      Head: Normocephalic and atraumatic. Mouth/Throat:      Mouth: Mucous membranes are moist.   Eyes:      General: No scleral icterus. Conjunctiva/sclera: Conjunctivae normal.   Cardiovascular:      Rate and Rhythm: Normal rate and regular rhythm. Heart sounds: No friction rub. Pulmonary:      Effort: Pulmonary effort is normal. No respiratory distress. Abdominal:      General: Bowel sounds are normal. There is no distension. Tenderness: There is no abdominal tenderness. Musculoskeletal:      Right lower leg: No edema. Left lower leg: No edema. Skin:     General: Skin is warm. Neurological:      Mental Status: He is alert.        Access: J TDC      Medications:   insulin lispro  0-12 Units Subcutaneous TID WC    insulin lispro  0-6 Units Subcutaneous Nightly    insulin glargine  25 Units Subcutaneous BID    insulin lispro  10 Units Subcutaneous TID WC    pantoprazole  40 mg Oral QAM AC    mupirocin   Nasal BID    famotidine  20 mg Oral Daily    calcium acetate  667 mg Oral TID WC    aspirin  81 mg Oral Daily    clopidogrel  75 mg Oral Daily    hydrALAZINE  25 mg Oral 3 times per day    metoprolol succinate  25 mg Oral Daily    pravastatin  80 mg

## 2020-04-30 NOTE — CONSULTS
04/30/2020    K 5.1 04/30/2020    CL 88 (L) 04/30/2020    CO2 20 (L) 04/30/2020    BUN 48 (H) 04/30/2020    CREATININE 3.8 (H) 04/30/2020     Lab Results   Component Value Date    ALT 15 04/25/2020    AST 16 04/25/2020    ALKPHOS 95 04/25/2020    BILITOT <0.2 04/25/2020     MRI lumbar spine - 4/25  Impression   Lumbar spine visualized starting at the level of the inferior endplate of B76.       No acute abnormality in the lumbar spine.       Degenerative disc disease without spinal canal narrowing.       Foraminal narrowing, moderate at right L5-S1. Most recent EKG on 4/25 revealed sinus bradycardia. Assessment:  1. DM1 w/ DKA  2. Bilateral LE weakness  3. Frequent falls  4. Debility  5. HTN / CAD  6. Aortic stenosis S/p TAVR  7. ESRD on HD    Recommendations:  1. Patient is good candidate for inpatient rehab once medically cleared   2. Will follow progress w/ PT/OT  3. Thanks for consult. JAY Aiken - CNP  4/30/2020, 7:27 PM    The patient was seen in conjunction with the nurse practitioner with independent history, evaluation and examination performed on the same day as her encounter. I agree with the note which has been adjusted to reflect my findings. I have had face to face contact with the patient and performed a substantive portion of the E/M visit. In summary, good candidate for rehab; precert with Yuki Sharp can be a challenge. Ciro Nguyen MD 4/30/2020, 8:07 PM

## 2020-04-30 NOTE — PROGRESS NOTES
Obesity (BMI 30-39. 9) [E66.9]    DM (diabetes mellitus) ([facility] [address] [E11.9]       DKA - admitted to ICU on IV insulin, now off and transitioned to Lantus/FSBS/SSI. Titrated up 27 April w/ continued FSBS elevation - now improved and titrated down slightly 29 April. Recently stared on Medrol dose-carlos. Last HbA1c 12.1% this admission, indicative of poor baseline control.      ESRD - on hemodialysis M/W/F. Nephrology consulted and appreciated.      HTN/CAD - w/ known CAD but no evidence of active signs/sxs of ischemia/failure. Currently controlled on home meds w/ vitals reviewed and documented as above. CHF - chronic diastolic failure w/ reduced/preserved EF 55% by Echo dated Dec 2019 w/ LVH and grade 1 diastolic dysfunction. No evidence of acute decompensation. Continue current medical mgt. HypoNatremia - likely pseudohyponatremia 2nd to elevated FSBS along w/ hypovolemia. Follow serial labs on IVF. Reviewed and documented as above. HyperKalemia - etiology clinically unable to determine. Follow serial labs. Reviewed and documented as above.     S/P fall and subjective feeling of bilateral lower extremity weakness-secondary to above there is no apparent injuries     Chronic compression fracture of T12 and T11      COPD - w/out chronic respiratory failure on no baseline home O2. Controlled on home medication regimen - continued. Obesity -  With Body mass index is 39.13 kg/m². Complicating assessment and treatment. Placing patient at risk for multiple co-morbidities as well as early death and contributing to the patient's presentation. Counseled on weight loss. ZAINAB - likely 2nd to obesity. Controlled on home CPAP/BiPap - continued, w/ outpatient f/u as previously arranged.       Hip/Leg pain - s/p mechanical fall 28 April. Films negative for fx/dislocation.   Pain med PRN.        DVT Prophylaxis: SQ Heparin  Diet: DIET GENERAL;  Code Status: Full Code      PT/OT Eval Status: seen w/ recs for inpatient rehab. Dispo -  Likely medically stable for discharge 30 April pending continued clinical improvement and placement decision.      Jessica Alexis MD

## 2020-04-30 NOTE — PLAN OF CARE
Pt remained free from falls during shift. Pt has skid resistant footwear on feet. Pt is alert and oriented, but refusing to be compliant. Pt repeatedly sits on the edge of the bed,stating his leg hurts. Pt has an immobilizer on and ice on leg; Pt refuses AVASYS. Pt told repeatedly that he needs staff in room when attempting to ambulate. Pt is unable to stand without assistance. Pt has call light and bedside table within reach. Pt is checked on every hour by staff and  has been moved closer to the nurses station.

## 2020-04-30 NOTE — PROGRESS NOTES
Pt refusing to use urinal, bedside commode and Stedy. Pt states that his leg feels numb when he stands and that he still has pain.   This RN had assistance with RN manager and pt was able to walk to bathroom and use the urinal with assistance x2;  Pt ambulated with assistance x2 back to bed and clothing changed;

## 2020-05-01 LAB
ALBUMIN SERPL-MCNC: 3.1 G/DL (ref 3.4–5)
ANION GAP SERPL CALCULATED.3IONS-SCNC: 16 MMOL/L (ref 3–16)
BASOPHILS ABSOLUTE: 0 K/UL (ref 0–0.2)
BASOPHILS RELATIVE PERCENT: 0.5 %
BUN BLDV-MCNC: 53 MG/DL (ref 7–20)
CALCIUM SERPL-MCNC: 8.6 MG/DL (ref 8.3–10.6)
CHLORIDE BLD-SCNC: 88 MMOL/L (ref 99–110)
CO2: 19 MMOL/L (ref 21–32)
CREAT SERPL-MCNC: 4.5 MG/DL (ref 0.9–1.3)
EOSINOPHILS ABSOLUTE: 0.3 K/UL (ref 0–0.6)
EOSINOPHILS RELATIVE PERCENT: 2.9 %
GFR AFRICAN AMERICAN: 17
GFR NON-AFRICAN AMERICAN: 14
GLUCOSE BLD-MCNC: 103 MG/DL (ref 70–99)
GLUCOSE BLD-MCNC: 115 MG/DL (ref 70–99)
GLUCOSE BLD-MCNC: 88 MG/DL (ref 70–99)
GLUCOSE BLD-MCNC: 88 MG/DL (ref 70–99)
GLUCOSE BLD-MCNC: 92 MG/DL (ref 70–99)
GLUCOSE BLD-MCNC: 92 MG/DL (ref 70–99)
HCT VFR BLD CALC: 23.9 % (ref 40.5–52.5)
HEMOGLOBIN: 8 G/DL (ref 13.5–17.5)
LYMPHOCYTES ABSOLUTE: 0.7 K/UL (ref 1–5.1)
LYMPHOCYTES RELATIVE PERCENT: 8 %
MCH RBC QN AUTO: 31.2 PG (ref 26–34)
MCHC RBC AUTO-ENTMCNC: 33.6 G/DL (ref 31–36)
MCV RBC AUTO: 92.7 FL (ref 80–100)
MONOCYTES ABSOLUTE: 0.8 K/UL (ref 0–1.3)
MONOCYTES RELATIVE PERCENT: 8.7 %
NEUTROPHILS ABSOLUTE: 7.3 K/UL (ref 1.7–7.7)
NEUTROPHILS RELATIVE PERCENT: 79.9 %
PDW BLD-RTO: 14.8 % (ref 12.4–15.4)
PERFORMED ON: ABNORMAL
PERFORMED ON: ABNORMAL
PERFORMED ON: NORMAL
PHOSPHORUS: 6.3 MG/DL (ref 2.5–4.9)
PLATELET # BLD: 156 K/UL (ref 135–450)
PMV BLD AUTO: 8.6 FL (ref 5–10.5)
POTASSIUM SERPL-SCNC: 5.2 MMOL/L (ref 3.5–5.1)
RBC # BLD: 2.58 M/UL (ref 4.2–5.9)
SODIUM BLD-SCNC: 123 MMOL/L (ref 136–145)
WBC # BLD: 9.2 K/UL (ref 4–11)

## 2020-05-01 PROCEDURE — 6370000000 HC RX 637 (ALT 250 FOR IP): Performed by: INTERNAL MEDICINE

## 2020-05-01 PROCEDURE — 85025 COMPLETE CBC W/AUTO DIFF WBC: CPT

## 2020-05-01 PROCEDURE — 6360000002 HC RX W HCPCS: Performed by: INTERNAL MEDICINE

## 2020-05-01 PROCEDURE — 97530 THERAPEUTIC ACTIVITIES: CPT

## 2020-05-01 PROCEDURE — 2580000003 HC RX 258

## 2020-05-01 PROCEDURE — 36415 COLL VENOUS BLD VENIPUNCTURE: CPT

## 2020-05-01 PROCEDURE — 97110 THERAPEUTIC EXERCISES: CPT

## 2020-05-01 PROCEDURE — 97116 GAIT TRAINING THERAPY: CPT

## 2020-05-01 PROCEDURE — 80069 RENAL FUNCTION PANEL: CPT

## 2020-05-01 PROCEDURE — 94660 CPAP INITIATION&MGMT: CPT

## 2020-05-01 PROCEDURE — 1200000000 HC SEMI PRIVATE

## 2020-05-01 PROCEDURE — 94640 AIRWAY INHALATION TREATMENT: CPT

## 2020-05-01 PROCEDURE — 90935 HEMODIALYSIS ONE EVALUATION: CPT

## 2020-05-01 RX ORDER — SODIUM CHLORIDE 0.9 % (FLUSH) 0.9 %
SYRINGE (ML) INJECTION
Status: COMPLETED
Start: 2020-05-01 | End: 2020-05-01

## 2020-05-01 RX ORDER — SODIUM CHLORIDE 9 MG/ML
INJECTION, SOLUTION INTRAVENOUS
Status: DISPENSED
Start: 2020-05-01 | End: 2020-05-02

## 2020-05-01 RX ADMIN — CLOPIDOGREL BISULFATE 75 MG: 75 TABLET ORAL at 10:10

## 2020-05-01 RX ADMIN — HYDROMORPHONE HYDROCHLORIDE 1 MG: 2 INJECTION, SOLUTION INTRAMUSCULAR; INTRAVENOUS; SUBCUTANEOUS at 13:26

## 2020-05-01 RX ADMIN — PRAVASTATIN SODIUM 80 MG: 80 TABLET ORAL at 10:09

## 2020-05-01 RX ADMIN — INSULIN GLARGINE 20 UNITS: 100 INJECTION, SOLUTION SUBCUTANEOUS at 10:11

## 2020-05-01 RX ADMIN — HYDROMORPHONE HYDROCHLORIDE 1 MG: 2 INJECTION, SOLUTION INTRAMUSCULAR; INTRAVENOUS; SUBCUTANEOUS at 22:35

## 2020-05-01 RX ADMIN — MELATONIN TAB 5 MG 5 MG: 5 TAB at 22:41

## 2020-05-01 RX ADMIN — PANTOPRAZOLE SODIUM 40 MG: 40 TABLET, DELAYED RELEASE ORAL at 05:03

## 2020-05-01 RX ADMIN — CYCLOBENZAPRINE HYDROCHLORIDE 10 MG: 10 TABLET, FILM COATED ORAL at 22:41

## 2020-05-01 RX ADMIN — ASPIRIN 81 MG 81 MG: 81 TABLET ORAL at 10:10

## 2020-05-01 RX ADMIN — CALCIUM ACETATE 667 MG: 667 CAPSULE ORAL at 10:09

## 2020-05-01 RX ADMIN — HYDRALAZINE HYDROCHLORIDE 25 MG: 25 TABLET, FILM COATED ORAL at 22:41

## 2020-05-01 RX ADMIN — HYDROMORPHONE HYDROCHLORIDE 1 MG: 2 INJECTION, SOLUTION INTRAMUSCULAR; INTRAVENOUS; SUBCUTANEOUS at 09:19

## 2020-05-01 RX ADMIN — HEPARIN SODIUM 5000 UNITS: 5000 INJECTION INTRAVENOUS; SUBCUTANEOUS at 05:02

## 2020-05-01 RX ADMIN — FAMOTIDINE 20 MG: 20 TABLET, FILM COATED ORAL at 10:10

## 2020-05-01 RX ADMIN — OXYCODONE HYDROCHLORIDE AND ACETAMINOPHEN 1 TABLET: 7.5; 325 TABLET ORAL at 05:03

## 2020-05-01 RX ADMIN — CYCLOBENZAPRINE HYDROCHLORIDE 10 MG: 10 TABLET, FILM COATED ORAL at 05:03

## 2020-05-01 RX ADMIN — LOSARTAN POTASSIUM 50 MG: 25 TABLET, FILM COATED ORAL at 10:10

## 2020-05-01 RX ADMIN — QUETIAPINE FUMARATE 50 MG: 25 TABLET ORAL at 17:20

## 2020-05-01 RX ADMIN — HYDROMORPHONE HYDROCHLORIDE 1 MG: 2 INJECTION, SOLUTION INTRAMUSCULAR; INTRAVENOUS; SUBCUTANEOUS at 18:21

## 2020-05-01 RX ADMIN — Medication 10 ML: at 22:36

## 2020-05-01 RX ADMIN — INSULIN GLARGINE 20 UNITS: 100 INJECTION, SOLUTION SUBCUTANEOUS at 21:35

## 2020-05-01 RX ADMIN — HEPARIN SODIUM 5000 UNITS: 5000 INJECTION INTRAVENOUS; SUBCUTANEOUS at 21:35

## 2020-05-01 RX ADMIN — CALCIUM ACETATE 667 MG: 667 CAPSULE ORAL at 17:20

## 2020-05-01 RX ADMIN — HYDRALAZINE HYDROCHLORIDE 25 MG: 25 TABLET, FILM COATED ORAL at 05:03

## 2020-05-01 RX ADMIN — OXYCODONE HYDROCHLORIDE AND ACETAMINOPHEN 1 TABLET: 7.5; 325 TABLET ORAL at 17:20

## 2020-05-01 RX ADMIN — METOPROLOL SUCCINATE 25 MG: 25 TABLET, EXTENDED RELEASE ORAL at 10:10

## 2020-05-01 RX ADMIN — GLYCOPYRROLATE AND FORMOTEROL FUMARATE 2 PUFF: 9; 4.8 AEROSOL, METERED RESPIRATORY (INHALATION) at 08:31

## 2020-05-01 ASSESSMENT — PAIN SCALES - GENERAL
PAINLEVEL_OUTOF10: 9
PAINLEVEL_OUTOF10: 10
PAINLEVEL_OUTOF10: 6
PAINLEVEL_OUTOF10: 10
PAINLEVEL_OUTOF10: 8
PAINLEVEL_OUTOF10: 10

## 2020-05-01 ASSESSMENT — PAIN DESCRIPTION - PAIN TYPE: TYPE: ACUTE PAIN

## 2020-05-01 ASSESSMENT — PAIN DESCRIPTION - ORIENTATION
ORIENTATION: RIGHT
ORIENTATION: RIGHT

## 2020-05-01 ASSESSMENT — PAIN DESCRIPTION - LOCATION
LOCATION: LEG
LOCATION: LEG

## 2020-05-01 NOTE — PROGRESS NOTES
Risk  Position Activity Restriction  Other position/activity restrictions: High fall risk per nursing assessment, up with assistance, KI for comfort PRN  Subjective   General  Chart Reviewed: Yes  Response To Previous Treatment: Patient reporting fatigue but able to participate. Family / Caregiver Present: No  Referring Practitioner: Dr. Ernst Souza  Subjective  Subjective: Pt agreeable to work with PT/OT this morning with mod encouragement needed to attempt OOB activity. \"I can't do too much because my legs are numb. \"   General Comment  Comments: Pt resting in bed upon entry of therapy staff  Pain Screening  Patient Currently in Pain: Yes  Pain Assessment  Pain Assessment: 0-10  Pain Level: 10  Pain Location: Leg  Pain Orientation: Right  Non-Pharmaceutical Pain Intervention(s): Ambulation/Increased Activity;Repositioned; Therapeutic presence;Relaxation techniques  Vital Signs  Patient Currently in Pain: Yes       Orientation  Orientation  Overall Orientation Status: Within Normal Limits        Objective   Bed mobility  Supine to Sit: 2 Person assistance(mod A x1, Manny x1)  Sit to Supine: Stand by assistance  Scooting: Maximal assistance(to center of bed in sidelying, Manny to EOB in seated position)  Transfers  Sit to Stand: 2 Person Assistance(max A x2)  Stand to sit: 2 Person Assistance(Manny x2)  Bed to Chair: Unable to assess(pt refusing up to chair)  Ambulation  Ambulation? : (pt took a few lateral steps to HOB, approx 2 ft with Manny x 2 and SW)     Balance  Posture: Poor  Sitting - Static: Good;-  Sitting - Dynamic: Fair  Standing - Static: Fair;-  Standing - Dynamic: Fair;-  Exercises  Quad Sets: x 20 B  Heelslides: x 20 LLE  Gluteal Sets: x 20   Ankle Pumps: x 20 B with tactile cues on LLE                          AM-PAC Score  AM-PAC Inpatient Mobility Raw Score : 11 (05/01/20 1022)  AM-PAC Inpatient T-Scale Score : 33.86 (05/01/20 1022)  Mobility Inpatient CMS 0-100% Score: 72.57 (05/01/20 1022)  Mobility Inpatient CMS G-Code Modifier : CL (05/01/20 1022)          Goals  Short term goals  Time Frame for Short term goals: 1 week, 5/06/20 (unless otherwise specified)  Short term goal 1: Pt will transfer supine <-> sit with modified(I); modA x 1 and Manny x1 supine to sit on 5/1/20  Short term goal 2: Pt will transfer sit <-> stand and bed>chair using walker or least AD with CGA x 1; max A x2 for sit to stand 5/1/20  Short term goal 3: Pt will ambulate x 50 feet using walker or least AD with min/CGA x 1; Manny x 2 for 2 ft lateral steps to [facility] on 5/1/20  Short term goal 4: By 5/01/20: Pt will tolerate 12-15 reps BLE exercise for ROM/strengthening; progressing on 5/1/20  Patient Goals   Patient goals : \"To get back to being able to walk. \"    Plan    Plan  Times per week: 3-5x/week in acute care  Times per day: Daily  Specific instructions for Next Treatment: Progress ther ex and mobility as tolerated, progress to amb with walker as able  Current Treatment Recommendations: Strengthening, Gait Training, Balance Training, Functional Mobility Training, Transfer Training, Safety Education & Training, Patient/Caregiver Education & Training, Equipment Evaluation, Education, & procurement, Positioning, Home Exercise Program, Pain Management, ROM  Safety Devices  Type of devices: All fall risk precautions in place, Call light within reach, Nurse notified, Gait belt, Patient at risk for falls, Bed alarm in place, Left in bed     Therapy Time   Individual Concurrent Group Co-treatment   Time In 0834         Time Out 0913         Minutes 44              If pt is discharged prior to next therapy session, this note will serve as discharge summary.     Katheryn Jules, PT

## 2020-05-01 NOTE — PROGRESS NOTES
awareness of need for assistance     Type of ROM/Therapeutic Exercise  Comment: pt in too much pain to tolerate BUE therex    Plan   Plan  Times per week: 3-5x's a week while in acute care    AM-PAC Score  AM-PAC Inpatient Daily Activity Raw Score: 15 (05/01/20 1029)  AM-PAC Inpatient ADL T-Scale Score : 34.69 (05/01/20 1029)  ADL Inpatient CMS 0-100% Score: 56.46 (05/01/20 1029)  ADL Inpatient CMS G-Code Modifier : CK (05/01/20 1029)    Goals  Short term goals  Time Frame for Short term goals: 1 week unless otherwise specified 5/6  Short term goal 1: Pt will complete LE dressing with SBA-- ongoing  Short term goal 2: Pt will complete toilet with SBA by 5/4-- ongoing  Short term goal 3: Pt will complete standing level ADLs with SBA for balance-- ongoing  Patient Goals   Patient goals : \"to be able to walk without pain\"       Therapy Time   Individual Concurrent Group Co-treatment   Time In 0834         Time Out 0912         Minutes [address] [facility], SETHI/L

## 2020-05-01 NOTE — PROGRESS NOTES
Oral 3 times per day    metoprolol succinate  25 mg Oral Daily    pravastatin  80 mg Oral Daily    QUEtiapine  50 mg Oral QPM    glycopyrrolate-formoterol  2 puff Inhalation BID    heparin (porcine)  5,000 Units Subcutaneous 3 times per day    losartan  50 mg Oral Daily         Labs:  Recent Labs     04/29/20  0800   WBC 13.2*   HGB 9.4*   HCT 27.8*   MCV 92.3        Recent Labs     04/29/20  0800 04/30/20  0730   * 125*   K 5.5* 5.1   CL 88* 88*   CO2 23 20*   GLUCOSE 223* 82   PHOS 5.6* 5.4*   BUN 65* 48*   CREATININE 4.5* 3.8*   LABGLOM 14* 17*   GFRAA 17* 20*           Assessment/Plan:    - End stage renal disease - on HD Mon-Wed-Fri.     - DM - mgmt per Admitting team.     - Hypertension - BP is reasonable.     - Anemia of chronic disease - Aranesp 25mcg qweekly with HD.    - R thigh pain S/P fall - Ortho following, conservative management. - Hyperkalemia/Hyponatremia - From ESRD and hyperglycemia. Please do not hesitate to contact me at [phone] if with questions. Thank you!     Aden Montanez MD  5/1/2020  [facility]

## 2020-05-01 NOTE — PROGRESS NOTES
· Goals: Pt will consume greater than 50% of meals and ONS this admission    · Monitoring: Meal Intake, Supplement Intake, Weight, Pertinent Labs      Electronically signed by Oracio Berry MS, RD, LD on 5/1/20 at 12:24 PM EDT    Contact Number: Office: [phone]

## 2020-05-01 NOTE — PROGRESS NOTES
respiratory effort. Clear to auscultation, bilaterally without Rales/Wheezes/Rhonchi. Cardiovascular: Regular rate and rhythm with normal S1/S2 without murmurs, rubs or gallops. Abdomen: Soft, non-tender, non-distended with normal bowel sounds. Musculoskeletal: No clubbing, cyanosis or edema bilaterally. Full range of motion without deformity. Skin: Skin color, texture, turgor normal.  No rashes or lesions. Neurologic:  Neurovascularly intact without any focal sensory/motor deficits. Cranial nerves: II-XII intact, grossly non-focal.  Psychiatric: Alert and oriented, thought content appropriate, normal insight  Capillary Refill: Brisk,< 3 seconds   Peripheral Pulses: +2 palpable, equal bilaterally       Labs:   Recent Labs     04/29/20  0800   WBC 13.2*   HGB 9.4*   HCT 27.8*        Recent Labs     04/29/20  0800 04/30/20  0730   * 125*   K 5.5* 5.1   CL 88* 88*   CO2 23 20*   BUN 65* 48*   CREATININE 4.5* 3.8*   CALCIUM 8.8 8.9   PHOS 5.6* 5.4*     No results for input(s): AST, ALT, BILIDIR, BILITOT, ALKPHOS in the last 72 hours. No results for input(s): INR in the last 72 hours. No results for input(s): Valentin Seip in the last 72 hours.     Urinalysis:      Lab Results   Component Value Date    NITRU Negative 04/25/2020    WBCUA 3-5 04/25/2020    BACTERIA 1+ 03/10/2020    RBCUA None seen 04/25/2020    BLOODU Negative 04/25/2020    SPECGRAV 1.020 04/25/2020    GLUCOSEU >=1000 04/25/2020       Consults:    IP CONSULT TO HOSPITALIST  IP CONSULT TO NEPHROLOGY  IP CONSULT TO ORTHOPEDIC SURGERY  IP CONSULT TO PHYSICAL MEDICINE REHAB      Assessment/Plan:    Active Hospital Problems    Diagnosis    S/p TAVR (transcatheter aortic valve replacement), bioprosthetic [Z95.3]     Priority: Medium    DKA, type 1, not at goal ([facility] [address] [E10.10]    Hypercholesteremia [E78.00]    End stage renal disease on dialysis ([facility] [address] [N18.6, Z99.2]    HTN (hypertension), benign [I10]    ZAINAB (obstructive sleep apnea) [G47.33]    Obesity (BMI 30-39. 9) [E66.9]    DM (diabetes mellitus) ([facility] [address] [E11.9]       DKA - admitted to ICU on IV insulin, now off and transitioned to Lantus/FSBS/SSI. Titrated up 27 April w/ continued FSBS elevation - now improved and titrated down slightly 29 and again 30 April. Recently stared on Medrol dose-carlos but HbA1c 12.1% this admission, indicative of poor baseline control likely unrelated to Medrol, raising the suspicion of medication non-compliance.      ESRD - on hemodialysis M/W/F. Nephrology consulted and appreciated.      HTN/CAD - w/ known CAD but no evidence of active signs/sxs of ischemia/failure. Currently controlled on home meds w/ vitals reviewed and documented as above. CHF - chronic diastolic failure w/ reduced/preserved EF 55% by Echo dated Dec 2019 w/ LVH and grade 1 diastolic dysfunction. No evidence of acute decompensation. Continue current medical mgt. HypoNatremia - likely pseudohyponatremia 2nd to elevated FSBS along w/ hypovolemia. Follow serial labs on IVF. Reviewed and documented as above. HyperKalemia - etiology clinically unable to determine. Follow serial labs. Reviewed and documented as above.     S/P fall and subjective feeling of bilateral lower extremity weakness-secondary to above there is no apparent injuries     Chronic compression fracture of T12 and T11      COPD - w/out chronic respiratory failure on no baseline home O2. Controlled on home medication regimen - continued. Obesity -  With Body mass index is 41.67 kg/m². Complicating assessment and treatment. Placing patient at risk for multiple co-morbidities as well as early death and contributing to the patient's presentation. Counseled on weight loss. ZAINAB - likely 2nd to obesity. Controlled on home CPAP/BiPap - continued, w/ outpatient f/u as previously arranged.       Hip/Leg pain - s/p mechanical fall 28 April. Films negative for fx/dislocation.   Pain med PRN.        DVT Prophylaxis: SQ Heparin  Diet: DIET GENERAL;  Code Status: Full Code      PT/OT Eval Status: seen w/ recs for inpatient rehab. Dispo -  Medically stable for discharge 30 April pending continued clinical improvement and placement decision.      Karyna Delcid MD

## 2020-05-01 NOTE — PROGRESS NOTES
kg/m². Assessment:  1. DM1 w/ DKA  2. Bilateral LE weakness  3. Frequent falls  4. Debility  5. HTN / CAD  6. Aortic stenosis S/p TAVR  7. ESRD on HD     Recommendations:  1. Patient is good candidate for inpatient rehab   2. Precert pending  3. Will follow    Ghazal Rivera MD 5/1/2020, 10:38 AM

## 2020-05-01 NOTE — PROGRESS NOTES
Shift assessment completed per documentation. Pt A&Ox4. VSS. Pt awake in bed. Pt c/o acute RLE pain 7/10. Bed locked and in lowest position. Bed alarm on. Nonskid slippers on. Side rails up 2/4. Call light and personal belongings within reach. Will continue to monitor.

## 2020-05-01 NOTE — CARE COORDINATION
Received call from [facility] [place], patient was denied ARU admission. Reason: Patient can be managed at SNF level.   Electronically signed by Natalia Irizarry RCP on 5/1/2020 at 4:29 PM

## 2020-05-02 LAB
ANION GAP SERPL CALCULATED.3IONS-SCNC: 15 MMOL/L (ref 3–16)
BUN BLDV-MCNC: 30 MG/DL (ref 7–20)
CALCIUM SERPL-MCNC: 8.7 MG/DL (ref 8.3–10.6)
CHLORIDE BLD-SCNC: 93 MMOL/L (ref 99–110)
CO2: 21 MMOL/L (ref 21–32)
CREAT SERPL-MCNC: 3.4 MG/DL (ref 0.9–1.3)
GFR AFRICAN AMERICAN: 23
GFR NON-AFRICAN AMERICAN: 19
GLUCOSE BLD-MCNC: 100 MG/DL (ref 70–99)
GLUCOSE BLD-MCNC: 123 MG/DL (ref 70–99)
GLUCOSE BLD-MCNC: 129 MG/DL (ref 70–99)
GLUCOSE BLD-MCNC: 136 MG/DL (ref 70–99)
GLUCOSE BLD-MCNC: 172 MG/DL (ref 70–99)
GLUCOSE BLD-MCNC: 90 MG/DL (ref 70–99)
PERFORMED ON: ABNORMAL
PERFORMED ON: NORMAL
POTASSIUM SERPL-SCNC: 4.4 MMOL/L (ref 3.5–5.1)
SODIUM BLD-SCNC: 129 MMOL/L (ref 136–145)

## 2020-05-02 PROCEDURE — 6370000000 HC RX 637 (ALT 250 FOR IP): Performed by: INTERNAL MEDICINE

## 2020-05-02 PROCEDURE — 6360000002 HC RX W HCPCS: Performed by: INTERNAL MEDICINE

## 2020-05-02 PROCEDURE — 94660 CPAP INITIATION&MGMT: CPT

## 2020-05-02 PROCEDURE — 94640 AIRWAY INHALATION TREATMENT: CPT

## 2020-05-02 PROCEDURE — 97530 THERAPEUTIC ACTIVITIES: CPT

## 2020-05-02 PROCEDURE — 80048 BASIC METABOLIC PNL TOTAL CA: CPT

## 2020-05-02 PROCEDURE — 36415 COLL VENOUS BLD VENIPUNCTURE: CPT

## 2020-05-02 PROCEDURE — 1200000000 HC SEMI PRIVATE

## 2020-05-02 RX ADMIN — CALCIUM ACETATE 667 MG: 667 CAPSULE ORAL at 18:02

## 2020-05-02 RX ADMIN — ASPIRIN 81 MG 81 MG: 81 TABLET ORAL at 09:35

## 2020-05-02 RX ADMIN — INSULIN LISPRO 1 UNITS: 100 INJECTION, SOLUTION INTRAVENOUS; SUBCUTANEOUS at 21:51

## 2020-05-02 RX ADMIN — CYCLOBENZAPRINE HYDROCHLORIDE 10 MG: 10 TABLET, FILM COATED ORAL at 18:01

## 2020-05-02 RX ADMIN — CALCIUM ACETATE 667 MG: 667 CAPSULE ORAL at 09:35

## 2020-05-02 RX ADMIN — HYDRALAZINE HYDROCHLORIDE 25 MG: 25 TABLET, FILM COATED ORAL at 21:48

## 2020-05-02 RX ADMIN — GLYCOPYRROLATE AND FORMOTEROL FUMARATE 2 PUFF: 9; 4.8 AEROSOL, METERED RESPIRATORY (INHALATION) at 20:11

## 2020-05-02 RX ADMIN — OXYCODONE HYDROCHLORIDE AND ACETAMINOPHEN 1 TABLET: 7.5; 325 TABLET ORAL at 09:35

## 2020-05-02 RX ADMIN — CLOPIDOGREL BISULFATE 75 MG: 75 TABLET ORAL at 09:35

## 2020-05-02 RX ADMIN — QUETIAPINE FUMARATE 50 MG: 25 TABLET ORAL at 18:02

## 2020-05-02 RX ADMIN — CALCIUM ACETATE 667 MG: 667 CAPSULE ORAL at 12:48

## 2020-05-02 RX ADMIN — PANTOPRAZOLE SODIUM 40 MG: 40 TABLET, DELAYED RELEASE ORAL at 06:25

## 2020-05-02 RX ADMIN — HEPARIN SODIUM 5000 UNITS: 5000 INJECTION INTRAVENOUS; SUBCUTANEOUS at 21:50

## 2020-05-02 RX ADMIN — PRAVASTATIN SODIUM 80 MG: 80 TABLET ORAL at 09:35

## 2020-05-02 RX ADMIN — HYDRALAZINE HYDROCHLORIDE 25 MG: 25 TABLET, FILM COATED ORAL at 06:25

## 2020-05-02 RX ADMIN — FAMOTIDINE 20 MG: 20 TABLET, FILM COATED ORAL at 09:35

## 2020-05-02 RX ADMIN — HYDRALAZINE HYDROCHLORIDE 25 MG: 25 TABLET, FILM COATED ORAL at 16:08

## 2020-05-02 RX ADMIN — OXYCODONE HYDROCHLORIDE AND ACETAMINOPHEN 1 TABLET: 7.5; 325 TABLET ORAL at 21:48

## 2020-05-02 RX ADMIN — OXYCODONE HYDROCHLORIDE AND ACETAMINOPHEN 1 TABLET: 7.5; 325 TABLET ORAL at 16:09

## 2020-05-02 RX ADMIN — METOPROLOL SUCCINATE 25 MG: 25 TABLET, EXTENDED RELEASE ORAL at 09:35

## 2020-05-02 RX ADMIN — INSULIN GLARGINE 20 UNITS: 100 INJECTION, SOLUTION SUBCUTANEOUS at 12:48

## 2020-05-02 RX ADMIN — LOSARTAN POTASSIUM 50 MG: 25 TABLET, FILM COATED ORAL at 09:35

## 2020-05-02 RX ADMIN — HEPARIN SODIUM 5000 UNITS: 5000 INJECTION INTRAVENOUS; SUBCUTANEOUS at 06:25

## 2020-05-02 RX ADMIN — HEPARIN SODIUM 5000 UNITS: 5000 INJECTION INTRAVENOUS; SUBCUTANEOUS at 16:08

## 2020-05-02 RX ADMIN — INSULIN GLARGINE 20 UNITS: 100 INJECTION, SOLUTION SUBCUTANEOUS at 21:50

## 2020-05-02 ASSESSMENT — PAIN SCALES - GENERAL
PAINLEVEL_OUTOF10: 10
PAINLEVEL_OUTOF10: 10
PAINLEVEL_OUTOF10: 8
PAINLEVEL_OUTOF10: 10
PAINLEVEL_OUTOF10: 10

## 2020-05-02 ASSESSMENT — PAIN DESCRIPTION - PAIN TYPE
TYPE: ACUTE PAIN
TYPE: ACUTE PAIN

## 2020-05-02 ASSESSMENT — PAIN DESCRIPTION - LOCATION
LOCATION: LEG
LOCATION: LEG

## 2020-05-02 ASSESSMENT — PAIN DESCRIPTION - DESCRIPTORS: DESCRIPTORS: ACHING

## 2020-05-02 ASSESSMENT — PAIN DESCRIPTION - ORIENTATION
ORIENTATION: RIGHT
ORIENTATION: RIGHT

## 2020-05-02 NOTE — PROGRESS NOTES
Nephrology Progress Note   [email or web address]      This patient is a 44year old male whom we are following for ESRD. Subjective: The patient was seen and examined. Still complaining of pain in his R thigh. S/p HD yesterday    Family History: No family at bedside  ROS: No nausea or vomiting      Vitals:  /69   Pulse 92   Temp 98.9 °F (37.2 °C) (Oral)   Resp 16   Ht 5' 9\" (1.753 m)   Wt 270 lb 1 oz (122.5 kg)   SpO2 95%   BMI 39.88 kg/m²   I/O last 3 completed shifts: In: 9006 [P.O.:1490; I.V.:20]  Out: 4000 [Urine:200]  No intake/output data recorded. Physical Exam:  Physical Exam  Vitals signs reviewed. Constitutional:       General: He is not in acute distress. Appearance: Normal appearance. HENT:      Head: Normocephalic and atraumatic. Mouth/Throat:      Mouth: Mucous membranes are moist.   Eyes:      General: No scleral icterus. Conjunctiva/sclera: Conjunctivae normal.   Cardiovascular:      Rate and Rhythm: Normal rate and regular rhythm. Heart sounds: No friction rub. Pulmonary:      Effort: Pulmonary effort is normal. No respiratory distress. Abdominal:      General: Bowel sounds are normal. There is no distension. Tenderness: There is no abdominal tenderness. Musculoskeletal:      Right lower leg: No edema. Left lower leg: No edema. Skin:     General: Skin is warm. Neurological:      Mental Status: He is alert.        Access: [facility] TDC      Medications:   insulin glargine  20 Units Subcutaneous BID    insulin lispro  0-12 Units Subcutaneous TID WC    insulin lispro  0-6 Units Subcutaneous Nightly    insulin lispro  10 Units Subcutaneous TID WC    pantoprazole  40 mg Oral QAM AC    famotidine  20 mg Oral Daily    calcium acetate  667 mg Oral TID WC    aspirin  81 mg Oral Daily    clopidogrel  75 mg Oral Daily    hydrALAZINE  25 mg Oral 3 times per day    metoprolol succinate  25 mg Oral Daily    pravastatin  80 mg Oral Daily    QUEtiapine  50 mg Oral QPM    glycopyrrolate-formoterol  2 puff Inhalation BID    heparin (porcine)  5,000 Units Subcutaneous 3 times per day    losartan  50 mg Oral Daily         Labs:  Recent Labs     05/01/20  1315   WBC 9.2   HGB 8.0*   HCT 23.9*   MCV 92.7        Recent Labs     04/30/20  0730 05/01/20  1315 05/02/20  0902   * 123* 129*   K 5.1 5.2* 4.4   CL 88* 88* 93*   CO2 20* 19* 21   GLUCOSE 82 88 100*   PHOS 5.4* 6.3*  --    BUN 48* 53* 30*   CREATININE 3.8* 4.5* 3.4*   LABGLOM 17* 14* 19*   GFRAA 20* 17* 23*           Assessment/Plan:    - End stage renal disease - on HD Mon-Wed-Fri.  -next HD on Monday  -challenge DW    - DM - mgmt per Admitting team.     - Hypertension - BP is reasonable.     - Anemia of chronic disease - Aranesp 25mcg qweekly with HD.    - R thigh pain S/P fall - Ortho following, conservative management. - Hyperkalemia/Hyponatremia - From ESRD and hyperglycemia. -FR 1.5L per day    Please do not hesitate to contact me at [phone] if with questions. Thank you!     See Ball MD  5/2/2020  [facility]

## 2020-05-02 NOTE — PROGRESS NOTES
Shift assessments completed and charted. Pt alert & oriented, VSS, on RA, not in distress. C/o BLE numbness and tingling sensation along with B/L hip pain rated at 9 out 10. Medicated as per order, will continue to monitor and re-assess as needed.

## 2020-05-02 NOTE — FLOWSHEET NOTE
05/01/20 1300 05/01/20 1640   Vital Signs   /75 129/74   Temp 98.5 °F (36.9 °C) 97.8 °F (36.6 °C)   Pulse 91 110   Resp  --  24   Weight 272 lb 14.9 oz (123.8 kg) 251 lb 1.7 oz (113.9 kg)   Weight Method Bed scale Standing scale   Percent Weight Change -3.28 0   Dry Weight 253 lb 8.5 oz (115 kg)  --    Treatment Initiation   Dialyze Hours 3.25  --    Dialysis Bath   K+ (Potassium) 2  --    Ca+ (Calcium) 2.5  --    Na+ (Sodium) 138  --    HCO3 (Bicarb) 32  --    Post-Hemodialysis Assessment   Rinseback Volume (ml)  --  400 ml   Total Liters Processed (l/min)  --  60.9 l/min   Dialyzer Clearance  --  Moderately streaked   Duration of Treatment (minutes)  --  196 minutes   Heparin amount administered during treatment (units)  --  0 units   Hemodialysis Intake (ml)  --  300 ml   Hemodialysis Output (ml)  --  3800 ml   NET Removed (ml)  --  3100 ml   Tolerated Treatment  --  Good   Tolerated 3.25 hour hd tx well, net UF 3.1Liters. No meds given. Report to floor RN and tx record placed in chart for scan into the EMR.

## 2020-05-02 NOTE — PROGRESS NOTES
Aortic stenosis, Arthritis, Asthma, Bilateral hilar adenopathy syndrome, CAD (coronary artery disease), Cardiomyopathy ([address] CHF (congestive heart failure) ([address] Chronic pain, COPD (chronic obstructive pulmonary disease) ([address] Depression, Diabetes mellitus ([address] Difficult intravenous access, Emphysema of lung ([address] ESRD (end stage renal disease) on dialysis ([address] Fear of needles, Gastric ulcer, GERD (gastroesophageal reflux disease), Heart valve problem, Hemodialysis patient ([address] History of spinal fracture, Hx of blood clots, Hyperlipidemia, Hypertension, MI (myocardial infarction) ([address] Neuromuscular disorder ([address] Numbness and tingling in left arm, Pneumonia, PONV (postoperative nausea and vomiting), Prolonged emergence from general anesthesia, Sleep apnea, Stroke ([address] TIA (transient ischemic attack), and Unspecified diseases of blood and blood-forming organs. has a past surgical history that includes Tonsillectomy; cyst removal (08/14/2013); Colonoscopy; Coronary angioplasty with stent (05/26/15); vascular surgery (aprx 2 years ago); Colonoscopy (2/29/2015); Upper gastrointestinal endoscopy (01/06/2016); Upper gastrointestinal endoscopy (01/29/2017); other surgical history (02/01/2017); Dialysis fistula creation (Left, 10/30/2017); Diagnostic Cardiac Cath Lab Procedure; other surgical history (2018); other surgical history (Bilateral, 06/26/2018); pr lap insertion tunneled intraperitoneal catheter (N/A, 9/21/2018); other surgical history (Right, 09/2018); Dialysis fistula creation (Left, 3/27/2019); other surgical history (05/28/2019); Endoscopy, colon, diagnostic; Catheter Removal (Right, 7/2/2019); and Aortic valve replacement (N/A, 10/15/2019).     Restrictions  Restrictions/Precautions  Restrictions/Precautions: General Precautions, Fall Risk  Position Activity Restriction  Other position/activity restrictions: High fall risk per nursing assessment, up with assistance, KI for comfort steps to [facility] on 5/1/20  Short term goal 4: By 5/01/20: Pt will tolerate 12-15 reps BLE exercise for ROM/strengthening; progressing on 5/1/20  Patient Goals   Patient goals : \"To get back to being able to walk. \"    Plan    Plan  Times per week: 3-5x/week in acute care  Times per day: Daily  Specific instructions for Next Treatment: Progress ther ex and mobility as tolerated, progress to amb with walker as able  Current Treatment Recommendations: Strengthening, Gait Training, Balance Training, Functional Mobility Training, Transfer Training, Safety Education & Training, Patient/Caregiver Education & Training, Equipment Evaluation, Education, & procurement, Positioning, Home Exercise Program, Pain Management, ROM  Safety Devices  Type of devices: Call light within reach, Nurse notified, Patient at risk for falls, Left in bed(pt refusing bed alarm)  Restraints  Initially in place: No     Therapy Time   Individual Concurrent Group Co-treatment   Time In 1045         Time Out 1109         Minutes 24         Timed Code Treatment Minutes: 24 Minutes       Melina Valdez, PT

## 2020-05-02 NOTE — PLAN OF CARE
Pt a/o, rates pain appropriately using 0-10 pain scale; pt calls out as needed for pain intervention; Pt encouraged to use alternative pain relievers, such as ice and heat;  will continue to monitor and administer intervention as ordered and requested. Sandeep Wiley

## 2020-05-02 NOTE — CARE COORDINATION
Per [facility] for ARU, pt has been denied for ARU by Marie Curry. She will reach out with info for P2P Monday if desired. Pt rec'd for SNF but with dialysis requirements and being established [facility] pt [facility] would likely be only snf choice unless transport was worked out with facility of choice.

## 2020-05-02 NOTE — PROGRESS NOTES
Occupational Therapy  Facility/Department: [facility] A2 CARD TELEMETRY  Daily Treatment Note  NAME: Letty Krueger  : 1970  MRN: [id number]    Date of Service: 2020    Discharge Recommendations:  Subacute/Skilled Nursing Facility       Assessment   Performance deficits / Impairments: Decreased functional mobility ; Decreased ADL status; Decreased safe awareness;Decreased balance;Decreased strength    Assessment: Pt continues to be self limiting and not participate with therapy. Pt required max cues for education on importance of OOB activity and increased movement. Pt only agreeable to bed mobility and increased time and rest breaks. Pt able to lift leg for repositioning with cold applied. Pt required pillows in bed to prop on side. Pt would not tolerate 3 hours of therapy at this time. Cont with POC. Prognosis: Good  OT Education: OT Role;Plan of Care;Transfer Training;Equipment  REQUIRES OT FOLLOW UP: Yes  Activity Tolerance  Activity Tolerance: Patient limited by pain  Activity Tolerance: Treatment limited pt lack of participation  Safety Devices  Safety Devices in place: Yes  Type of devices: Left in bed;Call light within reach;Nurse notified         Patient Diagnosis(es): The primary encounter diagnosis was Bilateral leg numbness. Diagnoses of Hyperglycemia, Acute left-sided low back pain with left-sided sciatica, Fall, initial encounter, Generalized weakness, DKA, type 2, not at goal [facility]), and Type 2 diabetes mellitus with diabetic nephropathy, with long-term current use of insulin ([address] were also pertinent to this visit.       has a past medical history of Ambulatory dysfunction, Aortic stenosis, Arthritis, Asthma, Bilateral hilar adenopathy syndrome, CAD (coronary artery disease), Cardiomyopathy ([address] CHF (congestive heart failure) ([address] Chronic pain, COPD (chronic obstructive pulmonary disease) ([address] Depression, Diabetes mellitus ([address] Difficult intravenous access, Emphysema of lung ([address] ESRD (end stage renal disease) on dialysis ([address] Fear of needles, Gastric ulcer, GERD (gastroesophageal reflux disease), Heart valve problem, Hemodialysis patient ([address] History of spinal fracture, Hx of blood clots, Hyperlipidemia, Hypertension, MI (myocardial infarction) ([address] Neuromuscular disorder ([address] Numbness and tingling in left arm, Pneumonia, PONV (postoperative nausea and vomiting), Prolonged emergence from general anesthesia, Sleep apnea, Stroke ([address] TIA (transient ischemic attack), and Unspecified diseases of blood and blood-forming organs. has a past surgical history that includes Tonsillectomy; cyst removal (08/14/2013); Colonoscopy; Coronary angioplasty with stent (05/26/15); vascular surgery (aprx 2 years ago); Colonoscopy (2/29/2015); Upper gastrointestinal endoscopy (01/06/2016); Upper gastrointestinal endoscopy (01/29/2017); other surgical history (02/01/2017); Dialysis fistula creation (Left, 10/30/2017); Diagnostic Cardiac Cath Lab Procedure; other surgical history (2018); other surgical history (Bilateral, 06/26/2018); pr lap insertion tunneled intraperitoneal catheter (N/A, 9/21/2018); other surgical history (Right, 09/2018); Dialysis fistula creation (Left, 3/27/2019); other surgical history (05/28/2019); Endoscopy, colon, diagnostic; Catheter Removal (Right, 7/2/2019); and Aortic valve replacement (N/A, 10/15/2019). Restrictions  Restrictions/Precautions  Restrictions/Precautions: General Precautions, Fall Risk  Position Activity Restriction  Other position/activity restrictions: High fall risk per nursing assessment, up with assistance, KI for comfort PRN     Subjective   General  Chart Reviewed: Yes  Patient assessed for rehabilitation services?: Yes  Additional Pertinent Hx: fell getting into car from dialysis and fell again coming out of the bathroom on 4/28/20 in hospital room.    Response to previous treatment: Patient with no complaints from previous session  Family / Caregiver Present: No  Referring Practitioner: Genie Murcia MD 4/29/20  Diagnosis: falls  Subjective  Subjective: Pt agreeable to minimal therapy with max cues for encouragement  General Comment  Comments: RN notified that pt refusing bed alarm  Pain Assessment  Pain Assessment: 0-10  Pain Level: 10  Pain Type: Acute pain  Pain Location: Leg  Pain Orientation: Right  Non-Pharmaceutical Pain Intervention(s): Repositioned;Cold applied  Vital Signs  Patient Currently in Pain: Yes     Orientation  Orientation  Overall Orientation Status: Within Functional Limits     Objective    ADL  Additional Comments: pt declined all adls         Balance  Sitting Balance: (pt refusing to sit at this time)  Bed mobility  Rolling to Left: Stand by assistance  Rolling to Right: Stand by assistance  Scooting: Stand by assistance(to HOB)  Comment: trials of rolling in bed for increased comfort with max cues to attempt sitting, pt adamantly refusing  Transfers  Transfer Comments: pt refused standing at this time due to pain                       Cognition  Overall Cognitive Status: Exceptions  Safety Judgement: Decreased awareness of need for safety;Decreased awareness of need for assistance                                         Plan   Plan  Times per week: 3-5x's a week while in acute care    AM-PAC Score        -[facility] Inpatient Daily Activity Raw Score: 15 (05/02/20 1218)  AM-PAC Inpatient ADL T-Scale Score : 34.69 (05/02/20 1218)  ADL Inpatient CMS 0-100% Score: 56.46 (05/02/20 1218)  ADL Inpatient CMS G-Code Modifier : CK (05/02/20 1218)    Goals  Short term goals  Time Frame for Short term goals: 1 week unless otherwise specified 5/6  Short term goal 1: Pt will complete LE dressing with SBA-- ongoing  Short term goal 2: Pt will complete toilet with SBA by 5/4-- ongoing  Short term goal 3: Pt will complete standing level ADLs with SBA for balance-- ongoing  Patient Goals   Patient goals : \"to be able to walk without pain\" Therapy Time   Individual Concurrent Group Co-treatment   Time In 1045         Time Out 1109         Minutes 24         Timed Code Treatment Minutes: 24 Minutes       Jeannie Hoyt OTR/L

## 2020-05-02 NOTE — PROGRESS NOTES
05/02/20 0352   NIV Type   $NIV $Daily Charge   NIV Started/Stopped On   Equipment Type v60   Mode CPAP   Mask Type Full face mask   Mask Size Medium   Settings/Measurements   CPAP/EPAP 12 cmH2O   Resp 16   FiO2  25 %   Vt Exhaled 741 mL   Minute Volume 7 Liters   Mask Leak (lpm) 9.5 lpm   Comfort Level Good   Using Accessory Muscles No   SpO2 95   Breath Sounds   Right Upper Lobe Diminished   Right Middle Lobe Diminished   Right Lower Lobe Diminished   Left Upper Lobe Diminished   Left Lower Lobe Diminished   Alarm Settings   Alarms On Y   Press Low Alarm 6 cmH2O   High Pressure Alarm 30 cmH2O   Apnea (secs) 20 secs   Resp Rate Low Alarm 6   High Respiratory Rate 40 br/min

## 2020-05-03 LAB
GLUCOSE BLD-MCNC: 130 MG/DL (ref 70–99)
GLUCOSE BLD-MCNC: 141 MG/DL (ref 70–99)
GLUCOSE BLD-MCNC: 185 MG/DL (ref 70–99)
GLUCOSE BLD-MCNC: 215 MG/DL (ref 70–99)
GLUCOSE BLD-MCNC: 71 MG/DL (ref 70–99)
GLUCOSE BLD-MCNC: 72 MG/DL (ref 70–99)
GLUCOSE BLD-MCNC: 89 MG/DL (ref 70–99)
PERFORMED ON: ABNORMAL
PERFORMED ON: NORMAL

## 2020-05-03 PROCEDURE — 6370000000 HC RX 637 (ALT 250 FOR IP): Performed by: INTERNAL MEDICINE

## 2020-05-03 PROCEDURE — 94761 N-INVAS EAR/PLS OXIMETRY MLT: CPT

## 2020-05-03 PROCEDURE — 94660 CPAP INITIATION&MGMT: CPT

## 2020-05-03 PROCEDURE — 1200000000 HC SEMI PRIVATE

## 2020-05-03 PROCEDURE — 2700000000 HC OXYGEN THERAPY PER DAY

## 2020-05-03 PROCEDURE — 94640 AIRWAY INHALATION TREATMENT: CPT

## 2020-05-03 PROCEDURE — 6360000002 HC RX W HCPCS: Performed by: INTERNAL MEDICINE

## 2020-05-03 RX ORDER — METHOCARBAMOL 500 MG/1
500 TABLET, FILM COATED ORAL 3 TIMES DAILY
Status: DISCONTINUED | OUTPATIENT
Start: 2020-05-03 | End: 2020-05-06 | Stop reason: HOSPADM

## 2020-05-03 RX ADMIN — FAMOTIDINE 20 MG: 20 TABLET, FILM COATED ORAL at 09:29

## 2020-05-03 RX ADMIN — CALCIUM ACETATE 667 MG: 667 CAPSULE ORAL at 12:13

## 2020-05-03 RX ADMIN — PRAVASTATIN SODIUM 80 MG: 80 TABLET ORAL at 09:29

## 2020-05-03 RX ADMIN — HEPARIN SODIUM 5000 UNITS: 5000 INJECTION INTRAVENOUS; SUBCUTANEOUS at 21:07

## 2020-05-03 RX ADMIN — INSULIN LISPRO 10 UNITS: 100 INJECTION, SOLUTION INTRAVENOUS; SUBCUTANEOUS at 12:16

## 2020-05-03 RX ADMIN — METHOCARBAMOL TABLETS 500 MG: 500 TABLET, COATED ORAL at 21:05

## 2020-05-03 RX ADMIN — ASPIRIN 81 MG 81 MG: 81 TABLET ORAL at 09:29

## 2020-05-03 RX ADMIN — HEPARIN SODIUM 5000 UNITS: 5000 INJECTION INTRAVENOUS; SUBCUTANEOUS at 15:52

## 2020-05-03 RX ADMIN — LOSARTAN POTASSIUM 50 MG: 25 TABLET, FILM COATED ORAL at 09:29

## 2020-05-03 RX ADMIN — OXYCODONE HYDROCHLORIDE AND ACETAMINOPHEN 1 TABLET: 7.5; 325 TABLET ORAL at 01:53

## 2020-05-03 RX ADMIN — METHOCARBAMOL TABLETS 500 MG: 500 TABLET, COATED ORAL at 18:24

## 2020-05-03 RX ADMIN — INSULIN LISPRO 10 UNITS: 100 INJECTION, SOLUTION INTRAVENOUS; SUBCUTANEOUS at 09:36

## 2020-05-03 RX ADMIN — ANTACID TABLETS 500 MG: 500 TABLET, CHEWABLE ORAL at 10:01

## 2020-05-03 RX ADMIN — INSULIN GLARGINE 20 UNITS: 100 INJECTION, SOLUTION SUBCUTANEOUS at 12:13

## 2020-05-03 RX ADMIN — INSULIN LISPRO 1 UNITS: 100 INJECTION, SOLUTION INTRAVENOUS; SUBCUTANEOUS at 21:06

## 2020-05-03 RX ADMIN — METOPROLOL SUCCINATE 25 MG: 25 TABLET, EXTENDED RELEASE ORAL at 09:29

## 2020-05-03 RX ADMIN — CLOPIDOGREL BISULFATE 75 MG: 75 TABLET ORAL at 09:29

## 2020-05-03 RX ADMIN — INSULIN LISPRO 2 UNITS: 100 INJECTION, SOLUTION INTRAVENOUS; SUBCUTANEOUS at 12:15

## 2020-05-03 RX ADMIN — HYDRALAZINE HYDROCHLORIDE 25 MG: 25 TABLET, FILM COATED ORAL at 21:22

## 2020-05-03 RX ADMIN — QUETIAPINE FUMARATE 50 MG: 25 TABLET ORAL at 18:23

## 2020-05-03 RX ADMIN — OXYCODONE HYDROCHLORIDE AND ACETAMINOPHEN 1 TABLET: 7.5; 325 TABLET ORAL at 21:22

## 2020-05-03 RX ADMIN — HYDRALAZINE HYDROCHLORIDE 25 MG: 25 TABLET, FILM COATED ORAL at 05:07

## 2020-05-03 RX ADMIN — INSULIN LISPRO 4 UNITS: 100 INJECTION, SOLUTION INTRAVENOUS; SUBCUTANEOUS at 09:36

## 2020-05-03 RX ADMIN — CYCLOBENZAPRINE HYDROCHLORIDE 10 MG: 10 TABLET, FILM COATED ORAL at 03:36

## 2020-05-03 RX ADMIN — OXYCODONE HYDROCHLORIDE AND ACETAMINOPHEN 1 TABLET: 7.5; 325 TABLET ORAL at 09:29

## 2020-05-03 RX ADMIN — CALCIUM ACETATE 667 MG: 667 CAPSULE ORAL at 18:23

## 2020-05-03 RX ADMIN — INSULIN GLARGINE 20 UNITS: 100 INJECTION, SOLUTION SUBCUTANEOUS at 21:05

## 2020-05-03 RX ADMIN — HYDRALAZINE HYDROCHLORIDE 25 MG: 25 TABLET, FILM COATED ORAL at 15:52

## 2020-05-03 RX ADMIN — GLYCOPYRROLATE AND FORMOTEROL FUMARATE 2 PUFF: 9; 4.8 AEROSOL, METERED RESPIRATORY (INHALATION) at 08:27

## 2020-05-03 RX ADMIN — HEPARIN SODIUM 5000 UNITS: 5000 INJECTION INTRAVENOUS; SUBCUTANEOUS at 05:07

## 2020-05-03 RX ADMIN — CALCIUM ACETATE 667 MG: 667 CAPSULE ORAL at 09:29

## 2020-05-03 RX ADMIN — PANTOPRAZOLE SODIUM 40 MG: 40 TABLET, DELAYED RELEASE ORAL at 05:07

## 2020-05-03 RX ADMIN — METHOCARBAMOL TABLETS 500 MG: 500 TABLET, COATED ORAL at 12:13

## 2020-05-03 ASSESSMENT — PAIN SCALES - GENERAL
PAINLEVEL_OUTOF10: 10
PAINLEVEL_OUTOF10: 10
PAINLEVEL_OUTOF10: 8

## 2020-05-03 NOTE — PROGRESS NOTES
Patient is alert and oriented;  BP's elevated, other VSS; Shift assessment completed; Bed locked and in lowest position. Pt is easily agitated; Bedside table and call light within reach; Pt denies further needs. Will continue to monitor.

## 2020-05-03 NOTE — PROGRESS NOTES
Nephrology Progress Note   [email or web address]      This patient is a 44year old male whom we are following for ESRD. Subjective: The patient was seen and examined. Awaiting placement  S/p HD yesterday    Family History: No family at bedside  ROS: No nausea or vomiting      Vitals:  BP (!) 152/92   Pulse 90   Temp 98.3 °F (36.8 °C) (Axillary)   Resp 20   Ht 5' 9\" (1.753 m)   Wt 262 lb (118.8 kg)   SpO2 91%   BMI 38.69 kg/m²   I/O last 3 completed shifts: In: 1080 [P.O.:1080]  Out: -   No intake/output data recorded. Physical Exam:  Physical Exam  Vitals signs reviewed. Constitutional:       General: He is not in acute distress. Appearance: Normal appearance. HENT:      Head: Normocephalic and atraumatic. Mouth/Throat:      Mouth: Mucous membranes are moist.   Eyes:      General: No scleral icterus. Conjunctiva/sclera: Conjunctivae normal.   Cardiovascular:      Rate and Rhythm: Normal rate and regular rhythm. Heart sounds: No friction rub. Pulmonary:      Effort: Pulmonary effort is normal. No respiratory distress. Abdominal:      General: Bowel sounds are normal. There is no distension. Tenderness: There is no abdominal tenderness. Musculoskeletal:      Right lower leg: No edema. Left lower leg: No edema. Skin:     General: Skin is warm. Neurological:      Mental Status: He is alert.        Access: [facility] TDC      Medications:   insulin glargine  20 Units Subcutaneous BID    insulin lispro  0-12 Units Subcutaneous TID WC    insulin lispro  0-6 Units Subcutaneous Nightly    insulin lispro  10 Units Subcutaneous TID WC    pantoprazole  40 mg Oral QAM AC    famotidine  20 mg Oral Daily    calcium acetate  667 mg Oral TID WC    aspirin  81 mg Oral Daily    clopidogrel  75 mg Oral Daily    hydrALAZINE  25 mg Oral 3 times per day    metoprolol succinate  25 mg Oral Daily    pravastatin  80 mg Oral Daily    QUEtiapine  50 mg Oral QPM   

## 2020-05-03 NOTE — PROGRESS NOTES
Patient awake, rating right leg pain 10/10, given prn pain medication per mar. Requesting a cigarette. This RN told patient he could have a nicotine patch or gum, he refused saying if he can't smoke then he wants to go home. This RN apologized to patient reiterating that he is not to go outside and smoke. Patient upset by this, but understands. PCA brought him something to eat. bedcheck on for safety. Will continue to monitor.

## 2020-05-03 NOTE — PROGRESS NOTES
Spoke with patients wife on the phone for updates.  Patient keeps calling her saying he doesn't know why hes here or why we are \"keeping him away from her\"    5 minutes prior to phone call the patient was able to answer orientation questions and talked about how \" the doctor discontinued his dilaudid earlier that that\"

## 2020-05-03 NOTE — PROGRESS NOTES
Assessment completed and documented. VSS. A/ox4. C/o 8/10 right leg pain, given prn pain medication per mar. cpap while sleeping. x1 assist to bathroom. Bed alarm on for safety. Calm and cooperative. Lungs diminished. Denies further needs at this time. Bed locked and in lowest position. Bedside table and call light within reach. Will continue to monitor.

## 2020-05-04 LAB
A/G RATIO: 1 (ref 1.1–2.2)
ALBUMIN SERPL-MCNC: 2.9 G/DL (ref 3.4–5)
ALP BLD-CCNC: 77 U/L (ref 40–129)
ALT SERPL-CCNC: 48 U/L (ref 10–40)
ANION GAP SERPL CALCULATED.3IONS-SCNC: 14 MMOL/L (ref 3–16)
AST SERPL-CCNC: 73 U/L (ref 15–37)
BILIRUB SERPL-MCNC: 0.3 MG/DL (ref 0–1)
BUN BLDV-MCNC: 39 MG/DL (ref 7–20)
CALCIUM SERPL-MCNC: 8.7 MG/DL (ref 8.3–10.6)
CHLORIDE BLD-SCNC: 95 MMOL/L (ref 99–110)
CO2: 21 MMOL/L (ref 21–32)
CREAT SERPL-MCNC: 4 MG/DL (ref 0.9–1.3)
GFR AFRICAN AMERICAN: 19
GFR NON-AFRICAN AMERICAN: 16
GLOBULIN: 2.9 G/DL
GLUCOSE BLD-MCNC: 101 MG/DL (ref 70–99)
GLUCOSE BLD-MCNC: 107 MG/DL (ref 70–99)
GLUCOSE BLD-MCNC: 114 MG/DL (ref 70–99)
GLUCOSE BLD-MCNC: 174 MG/DL (ref 70–99)
GLUCOSE BLD-MCNC: 176 MG/DL (ref 70–99)
GLUCOSE BLD-MCNC: 92 MG/DL (ref 70–99)
HCT VFR BLD CALC: 22.9 % (ref 40.5–52.5)
HEMOGLOBIN: 7.7 G/DL (ref 13.5–17.5)
MCH RBC QN AUTO: 31.3 PG (ref 26–34)
MCHC RBC AUTO-ENTMCNC: 33.7 G/DL (ref 31–36)
MCV RBC AUTO: 92.9 FL (ref 80–100)
PDW BLD-RTO: 14.8 % (ref 12.4–15.4)
PERFORMED ON: ABNORMAL
PERFORMED ON: NORMAL
PLATELET # BLD: 205 K/UL (ref 135–450)
PMV BLD AUTO: 7.9 FL (ref 5–10.5)
POTASSIUM SERPL-SCNC: 4.4 MMOL/L (ref 3.5–5.1)
RBC # BLD: 2.46 M/UL (ref 4.2–5.9)
SARS-COV-2, PCR: NOT DETECTED
SODIUM BLD-SCNC: 130 MMOL/L (ref 136–145)
TOTAL PROTEIN: 5.8 G/DL (ref 6.4–8.2)
WBC # BLD: 8.3 K/UL (ref 4–11)

## 2020-05-04 PROCEDURE — 6370000000 HC RX 637 (ALT 250 FOR IP): Performed by: INTERNAL MEDICINE

## 2020-05-04 PROCEDURE — 6360000002 HC RX W HCPCS: Performed by: INTERNAL MEDICINE

## 2020-05-04 PROCEDURE — 85027 COMPLETE CBC AUTOMATED: CPT

## 2020-05-04 PROCEDURE — 2580000003 HC RX 258

## 2020-05-04 PROCEDURE — 94640 AIRWAY INHALATION TREATMENT: CPT

## 2020-05-04 PROCEDURE — 97535 SELF CARE MNGMENT TRAINING: CPT

## 2020-05-04 PROCEDURE — 97116 GAIT TRAINING THERAPY: CPT

## 2020-05-04 PROCEDURE — 90935 HEMODIALYSIS ONE EVALUATION: CPT

## 2020-05-04 PROCEDURE — 97530 THERAPEUTIC ACTIVITIES: CPT

## 2020-05-04 PROCEDURE — 80053 COMPREHEN METABOLIC PANEL: CPT

## 2020-05-04 PROCEDURE — 1200000000 HC SEMI PRIVATE

## 2020-05-04 PROCEDURE — U0003 INFECTIOUS AGENT DETECTION BY NUCLEIC ACID (DNA OR RNA); SEVERE ACUTE RESPIRATORY SYNDROME CORONAVIRUS 2 (SARS-COV-2) (CORONAVIRUS DISEASE [COVID-19]), AMPLIFIED PROBE TECHNIQUE, MAKING USE OF HIGH THROUGHPUT TECHNOLOGIES AS DESCRIBED BY CMS-2020-01-R: HCPCS

## 2020-05-04 PROCEDURE — 2700000000 HC OXYGEN THERAPY PER DAY

## 2020-05-04 PROCEDURE — 94761 N-INVAS EAR/PLS OXIMETRY MLT: CPT

## 2020-05-04 RX ORDER — SODIUM CHLORIDE 9 MG/ML
INJECTION, SOLUTION INTRAVENOUS
Status: COMPLETED
Start: 2020-05-04 | End: 2020-05-04

## 2020-05-04 RX ORDER — METOPROLOL SUCCINATE 25 MG/1
25 TABLET, EXTENDED RELEASE ORAL DAILY
Qty: 90 TABLET | Refills: 3 | Status: SHIPPED | OUTPATIENT
Start: 2020-05-04 | End: 2020-05-05 | Stop reason: HOSPADM

## 2020-05-04 RX ADMIN — HEPARIN SODIUM 5000 UNITS: 5000 INJECTION INTRAVENOUS; SUBCUTANEOUS at 15:22

## 2020-05-04 RX ADMIN — PRAVASTATIN SODIUM 80 MG: 80 TABLET ORAL at 12:57

## 2020-05-04 RX ADMIN — INSULIN LISPRO 1 UNITS: 100 INJECTION, SOLUTION INTRAVENOUS; SUBCUTANEOUS at 22:09

## 2020-05-04 RX ADMIN — GLYCOPYRROLATE AND FORMOTEROL FUMARATE 2 PUFF: 9; 4.8 AEROSOL, METERED RESPIRATORY (INHALATION) at 14:05

## 2020-05-04 RX ADMIN — FAMOTIDINE 20 MG: 20 TABLET, FILM COATED ORAL at 12:58

## 2020-05-04 RX ADMIN — OXYCODONE HYDROCHLORIDE AND ACETAMINOPHEN 1 TABLET: 7.5; 325 TABLET ORAL at 13:13

## 2020-05-04 RX ADMIN — PANTOPRAZOLE SODIUM 40 MG: 40 TABLET, DELAYED RELEASE ORAL at 05:41

## 2020-05-04 RX ADMIN — LOSARTAN POTASSIUM 50 MG: 25 TABLET, FILM COATED ORAL at 12:58

## 2020-05-04 RX ADMIN — CALCIUM ACETATE 667 MG: 667 CAPSULE ORAL at 12:57

## 2020-05-04 RX ADMIN — HYDRALAZINE HYDROCHLORIDE 25 MG: 25 TABLET, FILM COATED ORAL at 05:41

## 2020-05-04 RX ADMIN — INSULIN LISPRO 10 UNITS: 100 INJECTION, SOLUTION INTRAVENOUS; SUBCUTANEOUS at 13:09

## 2020-05-04 RX ADMIN — INSULIN LISPRO 2 UNITS: 100 INJECTION, SOLUTION INTRAVENOUS; SUBCUTANEOUS at 13:02

## 2020-05-04 RX ADMIN — METOPROLOL SUCCINATE 25 MG: 25 TABLET, EXTENDED RELEASE ORAL at 12:58

## 2020-05-04 RX ADMIN — INSULIN GLARGINE 20 UNITS: 100 INJECTION, SOLUTION SUBCUTANEOUS at 13:04

## 2020-05-04 RX ADMIN — CLOPIDOGREL BISULFATE 75 MG: 75 TABLET ORAL at 12:58

## 2020-05-04 RX ADMIN — CALCIUM ACETATE 667 MG: 667 CAPSULE ORAL at 18:23

## 2020-05-04 RX ADMIN — HEPARIN SODIUM 5000 UNITS: 5000 INJECTION INTRAVENOUS; SUBCUTANEOUS at 22:09

## 2020-05-04 RX ADMIN — HYDRALAZINE HYDROCHLORIDE 25 MG: 25 TABLET, FILM COATED ORAL at 22:09

## 2020-05-04 RX ADMIN — QUETIAPINE FUMARATE 50 MG: 25 TABLET ORAL at 18:24

## 2020-05-04 RX ADMIN — METHOCARBAMOL TABLETS 500 MG: 500 TABLET, COATED ORAL at 22:09

## 2020-05-04 RX ADMIN — METHOCARBAMOL TABLETS 500 MG: 500 TABLET, COATED ORAL at 12:58

## 2020-05-04 RX ADMIN — HEPARIN SODIUM 5000 UNITS: 5000 INJECTION INTRAVENOUS; SUBCUTANEOUS at 05:41

## 2020-05-04 RX ADMIN — SODIUM CHLORIDE: 9 INJECTION, SOLUTION INTRAVENOUS at 13:32

## 2020-05-04 RX ADMIN — HYDRALAZINE HYDROCHLORIDE 25 MG: 25 TABLET, FILM COATED ORAL at 15:22

## 2020-05-04 RX ADMIN — HEPARIN SODIUM 4000 UNITS: 1000 INJECTION INTRAVENOUS; SUBCUTANEOUS at 11:43

## 2020-05-04 RX ADMIN — OXYCODONE HYDROCHLORIDE AND ACETAMINOPHEN 1 TABLET: 7.5; 325 TABLET ORAL at 03:12

## 2020-05-04 RX ADMIN — DARBEPOETIN ALFA 40 MCG: 40 INJECTION, SOLUTION INTRAVENOUS; SUBCUTANEOUS at 11:39

## 2020-05-04 RX ADMIN — GLYCOPYRROLATE AND FORMOTEROL FUMARATE 2 PUFF: 9; 4.8 AEROSOL, METERED RESPIRATORY (INHALATION) at 20:32

## 2020-05-04 RX ADMIN — INSULIN GLARGINE 20 UNITS: 100 INJECTION, SOLUTION SUBCUTANEOUS at 22:09

## 2020-05-04 RX ADMIN — ASPIRIN 81 MG 81 MG: 81 TABLET ORAL at 12:57

## 2020-05-04 ASSESSMENT — PAIN DESCRIPTION - PAIN TYPE
TYPE: ACUTE PAIN
TYPE: ACUTE PAIN

## 2020-05-04 ASSESSMENT — PAIN DESCRIPTION - ORIENTATION
ORIENTATION: RIGHT;LOWER
ORIENTATION: RIGHT;LOWER

## 2020-05-04 ASSESSMENT — PAIN DESCRIPTION - LOCATION
LOCATION: LEG;BACK
LOCATION: BACK;LEG

## 2020-05-04 ASSESSMENT — PAIN SCALES - GENERAL
PAINLEVEL_OUTOF10: 10
PAINLEVEL_OUTOF10: 9
PAINLEVEL_OUTOF10: 10
PAINLEVEL_OUTOF10: 10

## 2020-05-04 ASSESSMENT — PAIN DESCRIPTION - DESCRIPTORS: DESCRIPTORS: ACHING

## 2020-05-04 NOTE — PROGRESS NOTES
Dialysis nurse called for report. Transport put in for 0800. Notified patient.  Needs to eat before None known

## 2020-05-04 NOTE — PROGRESS NOTES
Assessment completed and documented. VSS. A/ox4, minor confusion with what hospital he is at, annoyed by orientation questions. C/o right leg pain 8/10, given prn pain medication per mar. x1 assist with walker. Can be easily agitated at times. Lungs diminished. Denies further needs at this time. Bed locked and in lowest position. Bedside table and call light within reach. Will continue to monitor.

## 2020-05-04 NOTE — PROGRESS NOTES
81 mg Oral Daily    clopidogrel  75 mg Oral Daily    hydrALAZINE  25 mg Oral 3 times per day    metoprolol succinate  25 mg Oral Daily    pravastatin  80 mg Oral Daily    QUEtiapine  50 mg Oral QPM    glycopyrrolate-formoterol  2 puff Inhalation BID    heparin (porcine)  5,000 Units Subcutaneous 3 times per day    losartan  50 mg Oral Daily         Labs:  Recent Labs     05/01/20  1315 05/04/20  0242   WBC 9.2 8.3   HGB 8.0* 7.7*   HCT 23.9* 22.9*   MCV 92.7 92.9    205     Recent Labs     05/01/20  1315 05/02/20  0902 05/04/20  0242   * 129* 130*   K 5.2* 4.4 4.4   CL 88* 93* 95*   CO2 19* 21 21   GLUCOSE 88 100* 101*   PHOS 6.3*  --   --    BUN 53* 30* 39*   CREATININE 4.5* 3.4* 4.0*   LABGLOM 14* 19* 16*   GFRAA 17* 23* 19*           Assessment/Plan:    - End stage renal disease - on HD Mon-Wed-Fri.  -Seen on HD today   -Try to get to 115 kg   - Agree with placement     - DM - mgmt per Admitting team.     - Hypertension - BP is reasonable.     - Anemia of chronic disease - Aranesp 25mcg qweekly with HD.    - R thigh pain S/P fall - Ortho following, conservative management. - Hyperkalemia/Hyponatremia - From ESRD and hyperglycemia. -FR 1.5L per day    Please do not hesitate to contact me at [phone] if with questions. Thank you!     Selma Rodriguez MD  5/4/2020  [facility]

## 2020-05-04 NOTE — PROGRESS NOTES
Short term goals: 1 week, 5/06/20 (unless otherwise specified)  Short term goal 1: Pt will transfer supine <-> sit with modified(I)-- progressing, pt completes bed mobility with supervision-SBA 5/4  Short term goal 2: Pt will transfer sit <-> stand and bed>chair using walker or least AD with CGA x 1-- GOAL MET 5/4  Short term goal 3: Pt will ambulate x 50 feet using walker or least AD with min/CGA x 1-- progressing, pt ambulates 15ft with SBA uisng RW 5/4  Short term goal 4: By 5/01/20: Pt will tolerate 12-15 reps BLE exercise for ROM/strengthening; progressing on 5/1/20  Patient Goals   Patient goals : \"To get back to being able to walk. \"    Plan    Plan  Times per week: 3-5x/week in acute care  Times per day: Daily  Specific instructions for Next Treatment: Progress ther ex and mobility as tolerated, progress to amb with walker as able  Current Treatment Recommendations: Strengthening, Gait Training, Balance Training, Functional Mobility Training, Transfer Training, Safety Education & Training, Patient/Caregiver Education & Training, Equipment Evaluation, Education, & procurement, Positioning, Home Exercise Program, Pain Management, ROM  Safety Devices  Type of devices:  All fall risk precautions in place, Call light within reach, Bed alarm in place, Left in bed, Nurse notified  Restraints  Initially in place: No     Therapy Time   Individual Concurrent Group Co-treatment   Time In 1335         Time Out 1400         Minutes 25         Timed Code Treatment Minutes: [address], [address] [facility], [address] [facility]

## 2020-05-04 NOTE — PROGRESS NOTES
05/03/20 2331   NIV Type   $NIV $Daily Charge   NIV Started/Stopped On   Equipment Type V60   Mode CPAP   Mask Type Full face mask   Mask Size Medium   Settings/Measurements   CPAP/EPAP 12 cmH2O   Resp 14   FiO2  25 %   Vt Exhaled 631 mL   Minute Volume 8.2 Liters   Mask Leak (lpm) 23 lpm   Comfort Level Good   Using Accessory Muscles No   SpO2 96   Breath Sounds   Right Upper Lobe Diminished   Right Middle Lobe Diminished   Right Lower Lobe Diminished   Left Upper Lobe Diminished   Left Lower Lobe Diminished   Alarm Settings   Alarms On Y   Press Low Alarm 6 cmH2O   High Pressure Alarm 30 cmH2O   Delay Alarm 20 sec(s)   Resp Rate Low Alarm 6   High Respiratory Rate 40 br/min

## 2020-05-04 NOTE — PROGRESS NOTES
Hospitalist Progress Note      PCP: Joy Ayoub MD    Date of Admission: 4/25/2020    Chief Complaint: Bilateral lower extremity weakness fall    Subjective:   Patient is up in bed, comfortable, not in distress. Complaining of right thigh pain. Denies any chest pain or shortness of breath. No new event overnight noted. Medications:  Reviewed    Infusion Medications    sodium chloride      dextrose       Scheduled Medications    darbepoetin siddharth-polysorbate  40 mcg Intravenous Weekly - Monday    methocarbamol  500 mg Oral TID    insulin glargine  20 Units Subcutaneous BID    insulin lispro  0-12 Units Subcutaneous TID WC    insulin lispro  0-6 Units Subcutaneous Nightly    insulin lispro  10 Units Subcutaneous TID WC    pantoprazole  40 mg Oral QAM AC    famotidine  20 mg Oral Daily    calcium acetate  667 mg Oral TID WC    aspirin  81 mg Oral Daily    clopidogrel  75 mg Oral Daily    hydrALAZINE  25 mg Oral 3 times per day    metoprolol succinate  25 mg Oral Daily    pravastatin  80 mg Oral Daily    QUEtiapine  50 mg Oral QPM    glycopyrrolate-formoterol  2 puff Inhalation BID    heparin (porcine)  5,000 Units Subcutaneous 3 times per day    losartan  50 mg Oral Daily     PRN Meds: calcium carbonate, promethazine, cyclobenzaprine, heparin (porcine), heparin (porcine), glucose, dextrose, glucagon (rDNA), dextrose, melatonin, oxyCODONE-acetaminophen      Intake/Output Summary (Last 24 hours) at 5/4/2020 1153  Last data filed at 5/3/2020 1420  Gross per 24 hour   Intake 240 ml   Output --   Net 240 ml       Physical Exam Performed:    /81   Pulse 94   Temp 97.6 °F (36.4 °C)   Resp 18   Ht 5' 9\" (1.753 m)   Wt 262 lb 5.6 oz (119 kg) Comment: Standing wt from floor this am  SpO2 97%   BMI 38.74 kg/m²     General appearance: No apparent distress, appears stated age and cooperative. HEENT: Pupils equal, round, and reactive to light. Conjunctivae/corneas clear.   Neck: Supple, [G47.33]    Obesity (BMI 30-39. 9) [E66.9]    DM (diabetes mellitus) ([facility] [E11.9]       DKA - admitted to ICU on IV insulin, now off and transitioned to Lantus/FSBS/SSI. Titrated up 27 April w/ continued FSBS elevation - now improved and titrated down slightly 29 and again 30 April. Recently stared on Medrol dose-carlos but HbA1c 12.1% this admission, indicative of poor baseline control likely unrelated to Medrol, raising the suspicion of medication non-compliance.      ESRD - on hemodialysis M/W/F. Nephrology consulted and appreciated.      HTN/CAD - w/ known CAD but no evidence of active signs/sxs of ischemia/failure. Currently controlled on home meds w/ vitals reviewed and documented as above. CHF - chronic diastolic failure w/ reduced/preserved EF 55% by Echo dated Dec 2019 w/ LVH and grade 1 diastolic dysfunction. No evidence of acute decompensation. Continue current medical mgt. HypoNatremia - likely pseudohyponatremia 2nd to elevated FSBS along w/ hypovolemia. Follow serial labs on IVF. Reviewed and documented as above. HyperKalemia - etiology clinically unable to determine. Follow serial labs. Reviewed and documented as above.     S/P fall and subjective feeling of bilateral lower extremity weakness-secondary to above there is no apparent injuries     Chronic compression fracture of T12 and T11      COPD - w/out chronic respiratory failure on no baseline home O2. Controlled on home medication regimen - continued. Obesity -  With Body mass index is 38.74 kg/m². Complicating assessment and treatment. Placing patient at risk for multiple co-morbidities as well as early death and contributing to the patient's presentation. Counseled on weight loss. ZAINAB - likely 2nd to obesity. Controlled on home CPAP/BiPap - continued, w/ outpatient f/u as previously arranged.       Hip/Leg pain - s/p mechanical fall 28 April. Films negative for fx/dislocation.   Pain med PRN.        DVT Prophylaxis: SQ Heparin  Diet: Dietary Nutrition Supplements: Diabetic Oral Supplement  DIET CARB CONTROL;  Code Status: Full Code      PT/OT Eval Status: seen w/ recs for inpatient rehab. Dispo -  Medically stable for discharge 30 April pending continued clinical improvement and placement decision.      Vida Stout MD

## 2020-05-04 NOTE — PROGRESS NOTES
Kathy Torrez  5/4/2020  [id number]    Chief Complaint: DKA, type 1, not at goal [facility])    Subjective:   Patient resting in bed this afternoon  No new changes  Patient's insurance denied ARU admission    ROS: no n/v, cp, sob, f/c  Objective:  Patient Vitals for the past 24 hrs:   BP Temp Temp src Pulse Resp SpO2 Weight   05/04/20 1406 -- -- -- -- -- 99 % --   05/04/20 1230 (!) 155/78 98 °F (36.7 °C) Oral 113 20 97 % --   05/04/20 1155 (!) 141/100 98.2 °F (36.8 °C) -- 108 18 -- 251 lb 15.8 oz (114.3 kg)   05/04/20 0821 117/81 97.6 °F (36.4 °C) -- 94 18 -- 262 lb 5.6 oz (119 kg)   05/04/20 0758 (!) 146/73 98.2 °F (36.8 °C) Oral 97 16 -- --   05/04/20 0410 -- -- -- -- -- -- 262 lb 6.4 oz (119 kg)   05/03/20 2331 -- -- -- -- 14 -- --   05/03/20 2112 (!) 155/78 97.7 °F (36.5 °C) Oral 91 16 97 % --     Gen: No distress  HEENT: Normocephalic, atraumatic. CV: Regular rate and rhythm. Resp: No respiratory distress. Abd: Soft, nontender   Ext: No edema. Neuro: Alert, oriented, appropriately interactive.    Wt Readings from Last 3 Encounters:   05/04/20 251 lb 15.8 oz (114.3 kg)   04/10/20 251 lb (113.9 kg)   03/10/20 250 lb (113.4 kg)       Laboratory data:   Lab Results   Component Value Date    WBC 8.3 05/04/2020    HGB 7.7 (L) 05/04/2020    HCT 22.9 (L) 05/04/2020    MCV 92.9 05/04/2020     05/04/2020       Lab Results   Component Value Date     05/04/2020    K 4.4 05/04/2020    K 5.0 03/10/2020    CL 95 05/04/2020    CO2 21 05/04/2020    BUN 39 05/04/2020    CREATININE 4.0 05/04/2020    GLUCOSE 101 05/04/2020    CALCIUM 8.7 05/04/2020        Therapy progress:  PT  Position Activity Restriction  Other position/activity restrictions: High fall risk per nursing assessment, up with assistance, KI for comfort PRN, IV  Objective     Sit to Stand: Unable to assess  Stand to sit: Unable to assess  Bed to Chair: Unable to assess     OT  PT Equipment Recommendations  Equipment Needed: No  Toilet - Technique:

## 2020-05-04 NOTE — PROGRESS NOTES
not at goal [facility]), and Type 2 diabetes mellitus with diabetic nephropathy, with long-term current use of insulin ([address] were also pertinent to this visit. has a past medical history of Ambulatory dysfunction, Aortic stenosis, Arthritis, Asthma, Bilateral hilar adenopathy syndrome, CAD (coronary artery disease), Cardiomyopathy ([address] CHF (congestive heart failure) ([address] Chronic pain, COPD (chronic obstructive pulmonary disease) ([address] Depression, Diabetes mellitus ([address] Difficult intravenous access, Emphysema of lung ([address] ESRD (end stage renal disease) on dialysis ([address] Fear of needles, Gastric ulcer, GERD (gastroesophageal reflux disease), Heart valve problem, Hemodialysis patient ([address] History of spinal fracture, Hx of blood clots, Hyperlipidemia, Hypertension, MI (myocardial infarction) ([address] Neuromuscular disorder ([address] Numbness and tingling in left arm, Pneumonia, PONV (postoperative nausea and vomiting), Prolonged emergence from general anesthesia, Sleep apnea, Stroke ([address] TIA (transient ischemic attack), and Unspecified diseases of blood and blood-forming organs. has a past surgical history that includes Tonsillectomy; cyst removal (08/14/2013); Colonoscopy; Coronary angioplasty with stent (05/26/15); vascular surgery (aprx 2 years ago); Colonoscopy (2/29/2015); Upper gastrointestinal endoscopy (01/06/2016); Upper gastrointestinal endoscopy (01/29/2017); other surgical history (02/01/2017); Dialysis fistula creation (Left, 10/30/2017); Diagnostic Cardiac Cath Lab Procedure; other surgical history (2018); other surgical history (Bilateral, 06/26/2018); pr lap insertion tunneled intraperitoneal catheter (N/A, 9/21/2018); other surgical history (Right, 09/2018); Dialysis fistula creation (Left, 3/27/2019); other surgical history (05/28/2019);  Endoscopy, colon, diagnostic; Catheter Removal (Right, 7/2/2019); and Aortic valve replacement (N/A, 10/15/2019). Restrictions  Restrictions/Precautions  Restrictions/Precautions: General Precautions, Fall Risk  Position Activity Restriction  Other position/activity restrictions: High fall risk per nursing assessment, up with assistance, KI for comfort PRN, IV  Subjective   General  Chart Reviewed: Yes  Patient assessed for rehabilitation services?: Yes  Additional Pertinent Hx: fell getting into car from dialysis and fell again coming out of the bathroom on 4/28/20 in hospital room. Response to previous treatment: Patient with no complaints from previous session  Family / Caregiver Present: No  Referring Practitioner: Genie Murcia MD 4/29/20  Diagnosis: falls  Subjective  Subjective: Pt in bed, agreeable with encouragement due to severe pain in RLE, wanting to get up \"to show that I can do something\". General Comment  Comments: RN notified that pt refusing bed alarm  Pain Assessment  Pain Assessment: 0-10  Pain Level: 10  Pain Type: Acute pain  Pain Location: Leg;Back  Pain Orientation: Right; Lower(R hamstring area, lower back)  Pain Descriptors: Aching  Non-Pharmaceutical Pain Intervention(s): Ambulation/Increased Activity; Emotional support;Repositioned  Response to Pain Intervention: Patient Satisfied  Vital Signs  Patient Currently in Pain: Yes   Orientation  Orientation  Overall Orientation Status: Within Functional Limits  Objective    ADL  Grooming: Supervision(to stand at sink to wash hands)  Toileting:  Moderate assistance(to manage pants up from ankles in stance, able to complete bowel hygiene in stance with supervision)        Balance  Sitting Balance: Independent  Standing Balance: Stand by assistance(with RW)  Standing Balance  Time: 30 seconds x2, 1-2 minutes  Activity: stand pivot bed>chair, transfer to/from bathroom, standing to complete bowel hygiene and grooming at sink  Comment: with RW  Functional Mobility  Functional - Mobility Device: Rolling Walker  Activity: To/from bathroom  Assist

## 2020-05-05 LAB
GLUCOSE BLD-MCNC: 104 MG/DL (ref 70–99)
GLUCOSE BLD-MCNC: 112 MG/DL (ref 70–99)
GLUCOSE BLD-MCNC: 115 MG/DL (ref 70–99)
GLUCOSE BLD-MCNC: 148 MG/DL (ref 70–99)
GLUCOSE BLD-MCNC: 157 MG/DL (ref 70–99)
PERFORMED ON: ABNORMAL

## 2020-05-05 PROCEDURE — 94640 AIRWAY INHALATION TREATMENT: CPT

## 2020-05-05 PROCEDURE — 6370000000 HC RX 637 (ALT 250 FOR IP): Performed by: INTERNAL MEDICINE

## 2020-05-05 PROCEDURE — 2700000000 HC OXYGEN THERAPY PER DAY

## 2020-05-05 PROCEDURE — 97110 THERAPEUTIC EXERCISES: CPT

## 2020-05-05 PROCEDURE — 1200000000 HC SEMI PRIVATE

## 2020-05-05 PROCEDURE — 94660 CPAP INITIATION&MGMT: CPT

## 2020-05-05 PROCEDURE — 94761 N-INVAS EAR/PLS OXIMETRY MLT: CPT

## 2020-05-05 PROCEDURE — 6360000002 HC RX W HCPCS: Performed by: INTERNAL MEDICINE

## 2020-05-05 PROCEDURE — 97530 THERAPEUTIC ACTIVITIES: CPT

## 2020-05-05 RX ORDER — OXYCODONE AND ACETAMINOPHEN 7.5; 325 MG/1; MG/1
1 TABLET ORAL EVERY 4 HOURS PRN
Qty: 18 TABLET | Refills: 0 | Status: SHIPPED | OUTPATIENT
Start: 2020-05-05 | End: 2020-05-08

## 2020-05-05 RX ORDER — METHOCARBAMOL 500 MG/1
500 TABLET, FILM COATED ORAL 3 TIMES DAILY
Qty: 30 TABLET | Refills: 0 | Status: SHIPPED | OUTPATIENT
Start: 2020-05-05 | End: 2020-05-15

## 2020-05-05 RX ADMIN — METHOCARBAMOL TABLETS 500 MG: 500 TABLET, COATED ORAL at 21:09

## 2020-05-05 RX ADMIN — METOPROLOL SUCCINATE 25 MG: 25 TABLET, EXTENDED RELEASE ORAL at 10:13

## 2020-05-05 RX ADMIN — INSULIN LISPRO 2 UNITS: 100 INJECTION, SOLUTION INTRAVENOUS; SUBCUTANEOUS at 12:12

## 2020-05-05 RX ADMIN — HEPARIN SODIUM 5000 UNITS: 5000 INJECTION INTRAVENOUS; SUBCUTANEOUS at 05:44

## 2020-05-05 RX ADMIN — PRAVASTATIN SODIUM 80 MG: 80 TABLET ORAL at 10:12

## 2020-05-05 RX ADMIN — INSULIN GLARGINE 20 UNITS: 100 INJECTION, SOLUTION SUBCUTANEOUS at 10:13

## 2020-05-05 RX ADMIN — MELATONIN TAB 5 MG 5 MG: 5 TAB at 00:40

## 2020-05-05 RX ADMIN — CALCIUM ACETATE 667 MG: 667 CAPSULE ORAL at 18:10

## 2020-05-05 RX ADMIN — GLYCOPYRROLATE AND FORMOTEROL FUMARATE 2 PUFF: 9; 4.8 AEROSOL, METERED RESPIRATORY (INHALATION) at 21:10

## 2020-05-05 RX ADMIN — PANTOPRAZOLE SODIUM 40 MG: 40 TABLET, DELAYED RELEASE ORAL at 05:44

## 2020-05-05 RX ADMIN — HEPARIN SODIUM 5000 UNITS: 5000 INJECTION INTRAVENOUS; SUBCUTANEOUS at 16:11

## 2020-05-05 RX ADMIN — OXYCODONE HYDROCHLORIDE AND ACETAMINOPHEN 1 TABLET: 7.5; 325 TABLET ORAL at 10:19

## 2020-05-05 RX ADMIN — INSULIN LISPRO 10 UNITS: 100 INJECTION, SOLUTION INTRAVENOUS; SUBCUTANEOUS at 18:10

## 2020-05-05 RX ADMIN — INSULIN LISPRO 10 UNITS: 100 INJECTION, SOLUTION INTRAVENOUS; SUBCUTANEOUS at 12:12

## 2020-05-05 RX ADMIN — CLOPIDOGREL BISULFATE 75 MG: 75 TABLET ORAL at 10:13

## 2020-05-05 RX ADMIN — CALCIUM ACETATE 667 MG: 667 CAPSULE ORAL at 10:12

## 2020-05-05 RX ADMIN — CALCIUM ACETATE 667 MG: 667 CAPSULE ORAL at 12:11

## 2020-05-05 RX ADMIN — QUETIAPINE FUMARATE 50 MG: 25 TABLET ORAL at 18:09

## 2020-05-05 RX ADMIN — FAMOTIDINE 20 MG: 20 TABLET, FILM COATED ORAL at 10:13

## 2020-05-05 RX ADMIN — HEPARIN SODIUM 5000 UNITS: 5000 INJECTION INTRAVENOUS; SUBCUTANEOUS at 21:06

## 2020-05-05 RX ADMIN — LOSARTAN POTASSIUM 50 MG: 25 TABLET, FILM COATED ORAL at 10:12

## 2020-05-05 RX ADMIN — HYDRALAZINE HYDROCHLORIDE 25 MG: 25 TABLET, FILM COATED ORAL at 21:09

## 2020-05-05 RX ADMIN — INSULIN LISPRO 10 UNITS: 100 INJECTION, SOLUTION INTRAVENOUS; SUBCUTANEOUS at 10:14

## 2020-05-05 RX ADMIN — OXYCODONE HYDROCHLORIDE AND ACETAMINOPHEN 1 TABLET: 7.5; 325 TABLET ORAL at 00:40

## 2020-05-05 RX ADMIN — HYDRALAZINE HYDROCHLORIDE 25 MG: 25 TABLET, FILM COATED ORAL at 05:44

## 2020-05-05 RX ADMIN — ASPIRIN 81 MG 81 MG: 81 TABLET ORAL at 10:12

## 2020-05-05 RX ADMIN — GLYCOPYRROLATE AND FORMOTEROL FUMARATE 2 PUFF: 9; 4.8 AEROSOL, METERED RESPIRATORY (INHALATION) at 08:08

## 2020-05-05 RX ADMIN — METHOCARBAMOL TABLETS 500 MG: 500 TABLET, COATED ORAL at 16:11

## 2020-05-05 RX ADMIN — HYDRALAZINE HYDROCHLORIDE 25 MG: 25 TABLET, FILM COATED ORAL at 16:11

## 2020-05-05 RX ADMIN — INSULIN GLARGINE 20 UNITS: 100 INJECTION, SOLUTION SUBCUTANEOUS at 21:06

## 2020-05-05 RX ADMIN — METHOCARBAMOL TABLETS 500 MG: 500 TABLET, COATED ORAL at 10:12

## 2020-05-05 ASSESSMENT — PAIN SCALES - GENERAL
PAINLEVEL_OUTOF10: 10
PAINLEVEL_OUTOF10: 7
PAINLEVEL_OUTOF10: 9
PAINLEVEL_OUTOF10: 8

## 2020-05-05 ASSESSMENT — PAIN DESCRIPTION - LOCATION
LOCATION: BACK;LEG
LOCATION: BACK

## 2020-05-05 ASSESSMENT — PAIN DESCRIPTION - ORIENTATION: ORIENTATION: RIGHT;LOWER

## 2020-05-05 ASSESSMENT — PAIN DESCRIPTION - PAIN TYPE: TYPE: ACUTE PAIN

## 2020-05-05 NOTE — PROGRESS NOTES
Nephrology Progress Note   [email or web address]      This patient is a 44year old male whom we are following for ESRD. Subjective: The patient was seen and examined. He is wanting to get out of the hospital  Depressed about needing therapy  No issues with dialysis. Considering [facility]     Family History: No family at bedside  ROS: No nausea or vomiting      Vitals:  /67   Pulse 100   Temp 98.3 °F (36.8 °C) (Oral)   Resp 18   Ht 5' 9\" (1.753 m)   Wt 256 lb 6.4 oz (116.3 kg)   SpO2 96%   BMI 37.86 kg/m²   I/O last 3 completed shifts: In: 1320 [P.O.:920]  Out: 4400   No intake/output data recorded. Physical Exam:  Physical Exam  Vitals signs reviewed. Constitutional:       General: He is not in acute distress. Appearance: Normal appearance. HENT:      Head: Normocephalic and atraumatic. Mouth/Throat:      Mouth: Mucous membranes are moist.   Eyes:      General: No scleral icterus. Conjunctiva/sclera: Conjunctivae normal.   Cardiovascular:      Rate and Rhythm: Normal rate and regular rhythm. Heart sounds: No friction rub. Pulmonary:      Effort: Pulmonary effort is normal. No respiratory distress. Abdominal:      General: Bowel sounds are normal. There is no distension. Tenderness: There is no abdominal tenderness. Musculoskeletal:      Right lower leg: No edema. Left lower leg: No edema. Skin:     General: Skin is warm. Neurological:      Mental Status: He is alert.        Access: [facility] TDC      Medications:   darbepoetin siddharth-polysorbate  40 mcg Intravenous Weekly - Monday    methocarbamol  500 mg Oral TID    insulin glargine  20 Units Subcutaneous BID    insulin lispro  0-12 Units Subcutaneous TID WC    insulin lispro  0-6 Units Subcutaneous Nightly    insulin lispro  10 Units Subcutaneous TID WC    pantoprazole  40 mg Oral QAM AC    famotidine  20 mg Oral Daily    calcium acetate  667 mg Oral TID WC    aspirin  81 mg Oral Daily    clopidogrel

## 2020-05-05 NOTE — PROGRESS NOTES
Pt assessment completed and charted. VSS. Pt a/o, but can be forgetful at times. Pt wants to get our of here. At start pf shift pt wanted to leave but after talking to pt. Pt agreed to stay. Pt states \" he misses his wife and feels he doesn't need to be here\". Pt denies any other needs at this time. Pt calls out appropriately. Will continue to monitor. Pt is a fall risk;  -Bed in lowest position and wheels locked. -Call light within reach.   -Bedside table within reach.   -Non-skid footwear in place.  -bed check in place.

## 2020-05-05 NOTE — PROGRESS NOTES
Physical Therapy  Facility/Department: [facility] A2 CARD TELEMETRY  Daily Treatment Note  NAME: Bijal Sosa  : 1970  MRN: [id number]    Date of Service: 2020    Discharge Recommendations:  24 hour supervision or assist, Home with Home health PT   PT Equipment Recommendations  Equipment Needed: Yes  Mobility Devices: [place]: Rolling    Assessment   Body structures, Functions, Activity limitations: Decreased functional mobility ; Decreased ROM; Decreased strength;Decreased balance; Increased pain;Decreased posture  Assessment: Pt demo's decreased participation this date compared to previous session. Only agreeable to sitting EOB and completing LE exercises. Pt with reports of increased pain, which limits tolerance to OOB activity and participation. Declines to ambulate or transfer OOB d/t pain (states \"I already walked today\"). Will continue to progress functional mobility as appropriate and as pt's pain allows. Treatment Diagnosis: RLE pain s/p fall with R hamstring tear  Prognosis: Good  Decision Making: Medium Complexity  PT Education: Goals;PT Role;Plan of Care;Precautions; Functional Mobility Training;Disease Specific Education;General Safety; Injury Prevention  Patient Education: pt verbalizes understanding, will require reinforcement  Barriers to Learning: pain  REQUIRES PT FOLLOW UP: Yes  Activity Tolerance  Activity Tolerance: Patient limited by pain     Patient Diagnosis(es): The primary encounter diagnosis was Bilateral leg numbness. Diagnoses of Hyperglycemia, Acute left-sided low back pain with left-sided sciatica, Fall, initial encounter, Generalized weakness, DKA, type 2, not at goal [facility]), and Type 2 diabetes mellitus with diabetic nephropathy, with long-term current use of insulin ([address] were also pertinent to this visit.      has a past medical history of Ambulatory dysfunction, Aortic stenosis, Arthritis, Asthma, Bilateral hilar adenopathy syndrome, CAD (coronary artery disease), walker or least AD with CGA x 1-- GOAL MET 5/4  Short term goal 3: Pt will ambulate x 50 feet using walker or least AD with min/CGA x 1-- progressing, pt ambulates 15ft with SBA using RW 5/4  Short term goal 4: By 5/01/20: Pt will tolerate 12-15 reps BLE exercise for ROM/strengthening-- GOAL MET 5/5, ongoing  Patient Goals   Patient goals : \"To get back to being able to walk. \"    Plan    Plan  Times per week: 3-5x/week in acute care  Times per day: Daily  Specific instructions for Next Treatment: Progress ther ex and mobility as tolerated, progress to amb with walker as able  Current Treatment Recommendations: Strengthening, Gait Training, Balance Training, Functional Mobility Training, Transfer Training, Safety Education & Training, Patient/Caregiver Education & Training, Equipment Evaluation, Education, & procurement, Positioning, Home Exercise Program, Pain Management, ROM  Safety Devices  Type of devices:  All fall risk precautions in place, Call light within reach, Bed alarm in place, Left in bed, Nurse notified  Restraints  Initially in place: No     Therapy Time   Individual Concurrent Group Co-treatment   Time In 1026         Time Out 1049         Minutes 23         Timed Code Treatment Minutes: [address] [place], [place], [address] [facility]

## 2020-05-05 NOTE — PROGRESS NOTES
April w/ continued FSBS elevation - now improved and titrated down slightly 29 and again 30 April. Recently stared on Medrol dose-carlos but HbA1c 12.1% this admission, indicative of poor baseline control likely unrelated to Medrol, raising the suspicion of medication non-compliance.      ESRD - on hemodialysis M/W/F. Nephrology consulted and appreciated.      HTN/CAD - w/ known CAD but no evidence of active signs/sxs of ischemia/failure. Currently controlled on home meds w/ vitals reviewed and documented as above. CHF - chronic diastolic failure w/ reduced/preserved EF 55% by Echo dated Dec 2019 w/ LVH and grade 1 diastolic dysfunction. No evidence of acute decompensation. Continue current medical mgt. HypoNatremia - likely pseudohyponatremia 2nd to elevated FSBS along w/ hypovolemia. Follow serial labs on IVF. Reviewed and documented as above. HyperKalemia - etiology clinically unable to determine. Follow serial labs. Reviewed and documented as above.     S/P fall and subjective feeling of bilateral lower extremity weakness-secondary to above there is no apparent injuries     Chronic compression fracture of T12 and T11      COPD - w/out chronic respiratory failure on no baseline home O2. Controlled on home medication regimen - continued. Obesity -  With Body mass index is 37.86 kg/m². Complicating assessment and treatment. Placing patient at risk for multiple co-morbidities as well as early death and contributing to the patient's presentation. Counseled on weight loss. ZAINAB - likely 2nd to obesity. Controlled on home CPAP/BiPap - continued, w/ outpatient f/u as previously arranged.       Hip/Leg pain - s/p mechanical fall 28 April. Films negative for fx/dislocation. Pain med PRN.        DVT Prophylaxis: SQ Heparin  Diet: Dietary Nutrition Supplements: Diabetic Oral Supplement  DIET CARB CONTROL;  Code Status: Full Code      PT/OT Eval Status: seen w/ recs for inpatient rehab.      Dispo -

## 2020-05-06 VITALS
SYSTOLIC BLOOD PRESSURE: 161 MMHG | OXYGEN SATURATION: 98 % | TEMPERATURE: 97.8 F | WEIGHT: 264.99 LBS | HEIGHT: 69 IN | DIASTOLIC BLOOD PRESSURE: 81 MMHG | HEART RATE: 104 BPM | BODY MASS INDEX: 39.25 KG/M2 | RESPIRATION RATE: 20 BRPM

## 2020-05-06 LAB
ALBUMIN SERPL-MCNC: 3 G/DL (ref 3.4–5)
ANION GAP SERPL CALCULATED.3IONS-SCNC: 12 MMOL/L (ref 3–16)
BUN BLDV-MCNC: 36 MG/DL (ref 7–20)
CALCIUM SERPL-MCNC: 9.1 MG/DL (ref 8.3–10.6)
CHLORIDE BLD-SCNC: 96 MMOL/L (ref 99–110)
CO2: 24 MMOL/L (ref 21–32)
CREAT SERPL-MCNC: 4.7 MG/DL (ref 0.9–1.3)
GFR AFRICAN AMERICAN: 16
GFR NON-AFRICAN AMERICAN: 13
GLUCOSE BLD-MCNC: 110 MG/DL (ref 70–99)
GLUCOSE BLD-MCNC: 110 MG/DL (ref 70–99)
GLUCOSE BLD-MCNC: 125 MG/DL (ref 70–99)
GLUCOSE BLD-MCNC: 174 MG/DL (ref 70–99)
HCT VFR BLD CALC: 22.7 % (ref 40.5–52.5)
HEMOGLOBIN: 7.6 G/DL (ref 13.5–17.5)
MCH RBC QN AUTO: 31.2 PG (ref 26–34)
MCHC RBC AUTO-ENTMCNC: 33.6 G/DL (ref 31–36)
MCV RBC AUTO: 92.7 FL (ref 80–100)
PDW BLD-RTO: 15.2 % (ref 12.4–15.4)
PERFORMED ON: ABNORMAL
PHOSPHORUS: 3.9 MG/DL (ref 2.5–4.9)
PLATELET # BLD: 224 K/UL (ref 135–450)
PMV BLD AUTO: 7.4 FL (ref 5–10.5)
POTASSIUM SERPL-SCNC: 4.4 MMOL/L (ref 3.5–5.1)
RBC # BLD: 2.45 M/UL (ref 4.2–5.9)
SODIUM BLD-SCNC: 132 MMOL/L (ref 136–145)
WBC # BLD: 6.9 K/UL (ref 4–11)

## 2020-05-06 PROCEDURE — 36415 COLL VENOUS BLD VENIPUNCTURE: CPT

## 2020-05-06 PROCEDURE — 94660 CPAP INITIATION&MGMT: CPT

## 2020-05-06 PROCEDURE — 6370000000 HC RX 637 (ALT 250 FOR IP): Performed by: INTERNAL MEDICINE

## 2020-05-06 PROCEDURE — 6360000002 HC RX W HCPCS: Performed by: INTERNAL MEDICINE

## 2020-05-06 PROCEDURE — 85027 COMPLETE CBC AUTOMATED: CPT

## 2020-05-06 PROCEDURE — 2700000000 HC OXYGEN THERAPY PER DAY

## 2020-05-06 PROCEDURE — 80069 RENAL FUNCTION PANEL: CPT

## 2020-05-06 PROCEDURE — 94640 AIRWAY INHALATION TREATMENT: CPT

## 2020-05-06 PROCEDURE — 90935 HEMODIALYSIS ONE EVALUATION: CPT

## 2020-05-06 PROCEDURE — 94761 N-INVAS EAR/PLS OXIMETRY MLT: CPT

## 2020-05-06 RX ADMIN — METOPROLOL SUCCINATE 25 MG: 25 TABLET, EXTENDED RELEASE ORAL at 13:16

## 2020-05-06 RX ADMIN — FAMOTIDINE 20 MG: 20 TABLET, FILM COATED ORAL at 13:15

## 2020-05-06 RX ADMIN — HEPARIN SODIUM 5000 UNITS: 5000 INJECTION INTRAVENOUS; SUBCUTANEOUS at 06:08

## 2020-05-06 RX ADMIN — PANTOPRAZOLE SODIUM 40 MG: 40 TABLET, DELAYED RELEASE ORAL at 06:08

## 2020-05-06 RX ADMIN — LOSARTAN POTASSIUM 50 MG: 25 TABLET, FILM COATED ORAL at 13:15

## 2020-05-06 RX ADMIN — INSULIN GLARGINE 20 UNITS: 100 INJECTION, SOLUTION SUBCUTANEOUS at 13:07

## 2020-05-06 RX ADMIN — INSULIN LISPRO 2 UNITS: 100 INJECTION, SOLUTION INTRAVENOUS; SUBCUTANEOUS at 13:05

## 2020-05-06 RX ADMIN — CLOPIDOGREL BISULFATE 75 MG: 75 TABLET ORAL at 13:17

## 2020-05-06 RX ADMIN — HEPARIN SODIUM 5000 UNITS: 5000 INJECTION INTRAVENOUS; SUBCUTANEOUS at 13:28

## 2020-05-06 RX ADMIN — DARBEPOETIN ALFA 100 MCG: 100 INJECTION, SOLUTION INTRAVENOUS; SUBCUTANEOUS at 11:18

## 2020-05-06 RX ADMIN — PRAVASTATIN SODIUM 80 MG: 80 TABLET ORAL at 13:15

## 2020-05-06 RX ADMIN — INSULIN LISPRO 10 UNITS: 100 INJECTION, SOLUTION INTRAVENOUS; SUBCUTANEOUS at 13:04

## 2020-05-06 RX ADMIN — METHOCARBAMOL TABLETS 500 MG: 500 TABLET, COATED ORAL at 13:16

## 2020-05-06 RX ADMIN — ASPIRIN 81 MG 81 MG: 81 TABLET ORAL at 13:17

## 2020-05-06 RX ADMIN — OXYCODONE HYDROCHLORIDE AND ACETAMINOPHEN 1 TABLET: 7.5; 325 TABLET ORAL at 13:29

## 2020-05-06 RX ADMIN — GLYCOPYRROLATE AND FORMOTEROL FUMARATE 2 PUFF: 9; 4.8 AEROSOL, METERED RESPIRATORY (INHALATION) at 08:21

## 2020-05-06 RX ADMIN — CALCIUM ACETATE 667 MG: 667 CAPSULE ORAL at 13:16

## 2020-05-06 RX ADMIN — HYDRALAZINE HYDROCHLORIDE 25 MG: 25 TABLET, FILM COATED ORAL at 06:13

## 2020-05-06 RX ADMIN — OXYCODONE HYDROCHLORIDE AND ACETAMINOPHEN 1 TABLET: 7.5; 325 TABLET ORAL at 00:35

## 2020-05-06 RX ADMIN — MELATONIN TAB 5 MG 5 MG: 5 TAB at 00:53

## 2020-05-06 RX ADMIN — HYDRALAZINE HYDROCHLORIDE 25 MG: 25 TABLET, FILM COATED ORAL at 13:29

## 2020-05-06 ASSESSMENT — PAIN SCALES - GENERAL
PAINLEVEL_OUTOF10: 9
PAINLEVEL_OUTOF10: 9
PAINLEVEL_OUTOF10: 1
PAINLEVEL_OUTOF10: 9

## 2020-05-06 NOTE — PROGRESS NOTES
Physical Therapy  PT attempted to see pt this morning however pt off the floor at dialysis. Will follow up with pt as schedule allows and as appropriate.   Thank you,  Dana Arevalo, PT, DPT

## 2020-05-06 NOTE — PROGRESS NOTES
05/06/20 0349   NIV Type   Skin Protection for O2 Device Yes   Equipment Type v60   Mode CPAP   Mask Type Full face mask   Mask Size Medium   Settings/Measurements   CPAP/EPAP 12 cmH2O   Resp 18   FiO2  25 %   Vt Exhaled 416 mL   Minute Volume 8 Liters   Mask Leak (lpm) 23 lpm   Comfort Level Good   Using Accessory Muscles No   SpO2 97   Alarm Settings   Alarms On Y   Press Low Alarm 6 cmH2O   High Pressure Alarm 30 cmH2O   Apnea (secs) 20 secs   Resp Rate Low Alarm 6   High Respiratory Rate 40 br/min

## 2020-05-06 NOTE — PLAN OF CARE
Problem: Falls - Risk of:  Goal: Will remain free from falls  Description: Will remain free from falls  Outcome: Ongoing  Pt remains free from falls during this shift. Pt a/o and calls out appropriately. Bed in lowest position and wheels locked. Call light within reach. Bedside table within reach. Pt educated to call out when needing assistance. Pt has bed check in place. Pt needs reinforced teaching, due to pt wants to get up on own but is not stable on feet. Pt use a walker to ambulate. Will continue to monitor.

## 2020-05-06 NOTE — FLOWSHEET NOTE
05/06/20 1215   Encounter Summary   Services provided to: Patient not available   Continue Visiting   (5/6 Tel-no answer on room phone)

## 2020-05-06 NOTE — PROGRESS NOTES
05/06/20 0145   NIV Type   $NIV $Daily Charge   Skin Protection for O2 Device Yes   NIV Started/Stopped On   Equipment Type v60   Mode CPAP   Mask Type Full face mask   Mask Size Medium   Settings/Measurements   CPAP/EPAP 12 cmH2O   Resp 14   FiO2  25 %   Vt Exhaled 782 mL   Minute Volume 11.1 Liters   Mask Leak (lpm) 20 lpm   Comfort Level Good   Using Accessory Muscles No   SpO2 98   Breath Sounds   Right Upper Lobe Diminished;Clear   Right Middle Lobe Diminished   Right Lower Lobe Diminished;Clear   Left Upper Lobe Diminished   Left Lower Lobe Diminished   Alarm Settings   Alarms On Y   Press Low Alarm 6 cmH2O   High Pressure Alarm 30 cmH2O   Apnea (secs) 20 secs   Resp Rate Low Alarm 6   High Respiratory Rate 40 br/min

## 2020-05-06 NOTE — DISCHARGE SUMMARY
thought content appropriate, normal insight  Capillary Refill: Brisk,< 3 seconds   Peripheral Pulses: +2 palpable, equal bilaterally       Labs: For convenience and continuity at follow-up the following most recent labs are provided:      CBC:    Lab Results   Component Value Date    WBC 6.9 05/06/2020    HGB 7.6 05/06/2020    HCT 22.7 05/06/2020     05/06/2020       Renal:    Lab Results   Component Value Date     05/06/2020    K 4.4 05/06/2020    K 5.0 03/10/2020    CL 96 05/06/2020    CO2 24 05/06/2020    BUN 36 05/06/2020    CREATININE 4.7 05/06/2020    CALCIUM 9.1 05/06/2020    PHOS 3.9 05/06/2020         Significant Diagnostic Studies    Radiology:   XR FEMUR RIGHT (MIN 2 VIEWS)   Final Result   1. No acute osseous abnormality of the pelvis or right femur identified. 2. Atherosclerotic disease. 3. Mild degenerative changes of the bilateral hips. XR HIP BILATERAL W AP PELVIS (2 VIEWS)   Final Result   1. No acute osseous abnormality of the pelvis or right femur identified. 2. Atherosclerotic disease. 3. Mild degenerative changes of the bilateral hips. MRI LUMBAR SPINE WO CONTRAST   Final Result   Lumbar spine visualized starting at the level of the inferior endplate of F50. No acute abnormality in the lumbar spine. Degenerative disc disease without spinal canal narrowing. Foraminal narrowing, moderate at right L5-S1. XR KNEE LEFT (1-2 VIEWS)   Final Result   1. No acute abnormality. CT LUMBAR SPINE TRAUMA RECONSTRUCTION   Final Result   1. No evidence of traumatic injury to the chest.  No acute pulmonary findings. 2. Atherosclerotic calcification in the aorta and coronary circulation. 3. No evidence of traumatic abdominal or pelvic visceral injury. 4. No evidence of traumatic injury to the lumbar spine. Stable mild   compression fractures of the superior endplate of U78 and M64.          CT CHEST ABDOMEN PELVIS WO CONTRAST   Final Result 1. No evidence of traumatic injury to the chest.  No acute pulmonary findings. 2. Atherosclerotic calcification in the aorta and coronary circulation. 3. No evidence of traumatic abdominal or pelvic visceral injury. 4. No evidence of traumatic injury to the lumbar spine. Stable mild   compression fractures of the superior endplate of K79 and E86. CT Head WO Contrast   Final Result   1. No acute intracranial abnormality. CT Cervical Spine WO Contrast   Final Result   No acute abnormality of the cervical spine. Consults:     IP CONSULT TO HOSPITALIST  IP CONSULT TO NEPHROLOGY  IP CONSULT TO ORTHOPEDIC SURGERY  IP CONSULT TO PHYSICAL MEDICINE REHAB    Disposition:  SNF     Condition at Discharge: Stable    Discharge Instructions/Follow-up:  PCP    Code Status:  Full Code     Activity: activity as tolerated    Diet: diabetic diet and renal diet      Discharge Medications:     Current Discharge Medication List           Details   methocarbamol (ROBAXIN) 500 MG tablet Take 1 tablet by mouth 3 times daily for 10 days  Qty: 30 tablet, Refills: 0              Details   oxyCODONE-acetaminophen (PERCOCET) 7.5-325 MG per tablet Take 1 tablet by mouth every 4 hours as needed for Pain for up to 3 days.   Qty: 18 tablet, Refills: 0    Comments: Reduce doses taken as pain becomes manageable  Associated Diagnoses: Degeneration of lumbar or lumbosacral intervertebral disc      insulin glargine (BASAGLAR KWIKPEN) 100 UNIT/ML injection pen Inject 30 Units into the skin 2 times daily  Qty: 18 mL, Refills: 0              Details   B Complex-C-Folic Acid (VIRT-CAPS) 1 MG CAPS TK ONE C PO  QD  Qty: 90 capsule, Refills: 1      QUEtiapine (SEROQUEL) 50 MG tablet Take 1 tablet by mouth every evening  Qty: 30 tablet, Refills: 5    Associated Diagnoses: Insomnia, unspecified type; Mood disorder (HCC)      DULoxetine (CYMBALTA) 30 MG extended release capsule TAKE 1 CAPSULE BY MOUTH EVERY DAY  Qty: 90 tablet TAKE 1 TABLET BY MOUTH ONCE DAILY  Qty: 90 tablet, Refills: 3      hydrOXYzine (VISTARIL) 50 MG capsule TAKE 1 TO 2 CAPSULES BY MOUTH NIGHTLY  Qty: 180 capsule, Refills: 10      gabapentin (NEURONTIN) 100 MG capsule TAKE 1 TO 2 CAPSULES BY MOUTH THREE TIMES A DAY  Qty: 540 capsule, Refills: 1    Associated Diagnoses: Chronic pain syndrome      ondansetron (ZOFRAN ODT) 4 MG disintegrating tablet Take 1 tablet by mouth every 8 hours as needed for Nausea  Qty: 15 tablet, Refills: 0      calcium acetate (PHOSLO) 667 MG capsule Take 1 capsule by mouth 3 times daily (with meals)  Qty: 60 capsule, Refills: 3      traZODone (DESYREL) 150 MG tablet TAKE (1) TABLET BY MOUTH NIGHTLY  Qty: 90 tablet, Refills: 10      !! ACCU-CHEK FASTCLIX LANCETS MISC TEST 3-4 TIMES DAILY, AS DIRECTED  Qty: 300 each, Refills: 3    Comments: Please dispense what is covered by insurance      !! blood glucose test strips (FREESTYLE LITE) strip Daily As needed.   Qty: 100 strip, Refills: 3    Comments: Please dispense what is covered by insurance      glucose monitoring kit (FREESTYLE) monitoring kit 1 kit by Does not apply route daily  Qty: 1 kit, Refills: 0    Comments: Please dispense what is covered by insurance      vitamin D (ERGOCALCIFEROL) 47399 units CAPS capsule TK 1 C PO WEEKLY  Refills: 11      Pediatric Multivitamins-Fl (MULTI VIT/FL) 0.25 MG CHEW Take 25 mg by mouth daily  Qty: 90 tablet, Refills: 1      flunisolide (NASALIDE) 25 MCG/ACT (0.025%) SOLN Inhale 2 sprays into the lungs every 12 hours  Qty: 1 Bottle, Refills: 5      Tiotropium Bromide-Olodaterol (STIOLTO RESPIMAT) 2.5-2.5 MCG/ACT AERS Inhale 2 puffs into the lungs daily  Qty: 2 Inhaler, Refills: 0    Comments: 2 samples given:  Lot #[id number], Exp 7/21 & Lot #[id number], Exp 7/21      Polyethylene Glycol 3350 GRAN       !! Glucose Blood (BLOOD GLUCOSE TEST STRIPS) STRP TEST 3-4 TIMES DAILY, AS DIRECTED  Qty: 100 strip, Refills: 3      Blood Glucose Monitoring Suppl ADAM USE AS DIRECTED. Qty: 1 Device, Refills: 0    Comments: WITH CONTROL SOLUTION. Alcohol Swabs PADS USE AS DIRECTED  Qty: 300 each, Refills: 3      albuterol sulfate  (90 Base) MCG/ACT inhaler Inhale 2 puffs into the lungs every 6 hours as needed for Wheezing  Qty: 1 Inhaler, Refills: 3      ipratropium-albuterol (DUONEB) 0.5-2.5 (3) MG/3ML SOLN nebulizer solution Inhale 3 mLs into the lungs every 6 hours as needed for Shortness of Breath  Qty: 360 mL, Refills: 1      !! Lancets MISC Test daily  Qty: 100 each, Refills: 3      calcium carbonate (TUMS) 500 MG chewable tablet Take 1 tablet by mouth 3 times daily as needed for Heartburn. aspirin 81 MG chewable tablet Take 1 tablet by mouth daily. Qty: 30 tablet, Refills: 2       !! - Potential duplicate medications found. Please discuss with provider. Time Spent on discharge is more than 30 minutes in the examination, evaluation, counseling and review of medications and discharge plan. Signed:    Denys Duffy MD   5/6/2020      Thank you Dorota Vargas MD for the opportunity to be involved in this patient's care. If you have any questions or concerns please feel free to contact me at [phone].

## 2020-05-07 NOTE — CARE COORDINATION
CM received message from Carolinamilagros Smith with [email or web address]. Reported patient left AMA within an hour of admission to facility.  Liam Whiteside RN

## 2020-05-13 ENCOUNTER — TELEPHONE (OUTPATIENT)
Dept: FAMILY MEDICINE CLINIC | Age: 50
End: 2020-05-13

## 2020-05-14 RX ORDER — FLUCONAZOLE 150 MG/1
TABLET ORAL
Qty: 1 TABLET | Refills: 0 | Status: ON HOLD | OUTPATIENT
Start: 2020-05-14 | End: 2020-12-18 | Stop reason: HOSPADM

## 2020-05-15 ENCOUNTER — TELEPHONE (OUTPATIENT)
Dept: FAMILY MEDICINE CLINIC | Age: 50
End: 2020-05-15

## 2020-05-15 RX ORDER — FLUCONAZOLE 150 MG/1
150 TABLET ORAL DAILY
Qty: 1 TABLET | Refills: 0 | Status: SHIPPED | OUTPATIENT
Start: 2020-05-15 | End: 2020-05-16

## 2020-05-26 ENCOUNTER — VIRTUAL VISIT (OUTPATIENT)
Dept: CARDIOLOGY CLINIC | Age: 50
End: 2020-05-26
Payer: COMMERCIAL

## 2020-05-26 VITALS
HEIGHT: 69 IN | HEART RATE: 99 BPM | DIASTOLIC BLOOD PRESSURE: 71 MMHG | SYSTOLIC BLOOD PRESSURE: 136 MMHG | WEIGHT: 265 LBS | BODY MASS INDEX: 39.25 KG/M2

## 2020-05-26 PROCEDURE — 99442 PR PHYS/QHP TELEPHONE EVALUATION 11-20 MIN: CPT | Performed by: INTERNAL MEDICINE

## 2020-05-26 RX ORDER — METOPROLOL SUCCINATE 25 MG/1
25 TABLET, EXTENDED RELEASE ORAL DAILY
Qty: 90 TABLET | Refills: 3 | Status: SHIPPED | OUTPATIENT
Start: 2020-05-26 | End: 2020-11-25

## 2020-05-26 RX ORDER — GABAPENTIN 100 MG/1
CAPSULE ORAL
Qty: 540 CAPSULE | Refills: 5 | Status: SHIPPED | OUTPATIENT
Start: 2020-05-26 | End: 2020-05-27

## 2020-05-26 NOTE — LETTER
Summary    1. There is minimal plaque seen in the right internal carotid artery with an  estimated diameter reduction of <50%.  2. There is minimal plaque seen in the left internal carotid artery with an  estimated diameter reduction of <50%.  3. The right vertebral artery is patent with antegrade flow. The left  vertebral artery was not seen due to limitations     Arterial dopplers 4/19/19  Summary    1. Limited exam due to nondiagnostic ankle brachial indices. There is  evidence of arterial disease involving the right lower extremity. There is  evidence of inflow and calf vessel disease.  2. Limited exam due to nondiagnostic ankle brachial indices. There is  evidence to suggest a 50-74% stenosis of the proximal common femoral artery  and an occlusion of the mid posterior tibial artery of the left lower  extremity.        Echo 7/1/19 [facility]  Study Conclusions    - Left ventricle: The cavity size is normal. There is mild    concentric hypertrophy. Systolic function was normal. The    calculated ejection fraction was 60%. Wall motion was normal;    there were no regional wall motion abnormalities. Features are    consistent with a pseudonormal left ventricular filling pattern,    with concomitant abnormal relaxation and increased filling    pressure (grade 2 diastolic dysfunction). The stroke volume is    118ml. The stroke index is 54ml/m^2. - Aortic valve: Marked diffuse thickening and calcification.    Transvalvular velocity is increased. There is severe stenosis.    There is mild to moderate regurgitation. The mean systolic    gradient is 03WV Hg. The peak systolic gradient is 94TP Hg. The    LVOT to aortic valve VTI ratio is 0.22. The valve area by the    velocity-time integral method is 1.0cm^2. The valve area index by    the velocity-time integral method is 0.46cm^2/m^2. - Mitral valve: The annulus is moderately calcified. - Inferior vena cava:  The vessel was normal in size; the

## 2020-05-26 NOTE — PROGRESS NOTES
2020    No chief complaint on file. Patient is not able to do a VV. TELEHEALTH EVALUATION -- Audio (During NTQCM-20 public health emergency)    HPI: Tessie Gonzales is a 44 y.o. male who is having a telephone visit today for follow up for a history of aortic stenosis- TAVR with 29 mm S3 in 10/2019, Coronary disease- nonobstructive by cath 17, PVD, hypertension, and end stage renal disease. He was hospitalized in 2020 for chest pain (occurred during HD) , arm pain, tingling and weakness. Symptoms were not felt to be cardiac. He was admitted to the hospital again from -2020 with weakness and a fall after HD. Found to be in DKA and admitted to ICU. Today he states he has random chest pain that is unchanged. He thinks his breathing is the same as it was prior to his TAVR. His main concern is weakness in his legs. He follows with nephrology weekly in HD. Patient currently denies any weight gain, edema, palpitations, shortness of breath, dizziness, and syncope. Tessie Gonzales (:  1970) has requested an audio/video evaluation for the following concern(s): follow up S/P TAVR,chest pain, leg weakness. [x] Medications and dosages reviewed. ROS:  [x]Full ROS obtained and negative except as mentioned in HPI    Prior to Visit Medications    Medication Sig Taking?  Authorizing Provider   gabapentin (NEURONTIN) 100 MG capsule TAKE 1 TO 2 CAPSULES BY MOUTH THREE TIMES A DAY  Abby Camargo MD   fluconazole (DIFLUCAN) 150 MG tablet TAKE 1 TABLET BY MOUTH 1 TIME FOR 1 DOSE  Abby Camargo MD   insulin glargine (BASAGLAR KWIKPEN) 100 UNIT/ML injection pen Inject 30 Units into the skin 2 times daily  Jessica Alexis MD   B Complex-C-Folic Acid (VIRT-CAPS) 1 MG CAPS TK ONE C PO  QD  Abby Camargo MD   QUEtiapine (SEROQUEL) 50 MG tablet Take 1 tablet by mouth every evening  Abby Camargo MD   DULoxetine (CYMBALTA) 30 MG extended release capsule TAKE 1 CAPSULE BY MOUTH EVERY DAY JAY Moreno CNP   nystatin-triamcinolone (MYCOLOG II) 065627-0.1 UNIT/GM-% cream Apply topically 2 times daily. JAY Weiss CNP   losartan (COZAAR) 50 MG tablet TAKE (1) TABLET BY MOUTH DAILY  Fely Roman MD   pantoprazole (PROTONIX) 40 MG tablet TAKE (1) TABLET BY MOUTH EACH MORNING BEFORE BREAKFAST  Fely Roman MD   albuterol (PROVENTIL) (2.5 MG/3ML) 0.083% nebulizer solution INHALE 1 VIAL VIA NEBULIZER EVERY 6 HOURS AS NEEDED FOR WHEEZING  Fely Roman MD   hydrALAZINE (APRESOLINE) 50 MG tablet Take 0.5 tablets by mouth every 8 hours  JAY Ferrera CNP   nystatin (MYCOSTATIN) 134729 UNIT/GM cream Apply topically 2 times daily. Fely Roman MD   insulin lispro (HUMALOG) 100 UNIT/ML injection vial Inject 10 Units into the skin 3 times daily (before meals)  Fely Roman MD   Insulin Syringe-Needle U-100 30G X 1/2\" 0.5 ML MISC 1 each by Does not apply route daily  Fely Roman MD   insulin aspart (NOVOLOG FLEXPEN) 100 UNIT/ML injection pen Inject 10 Units into the skin 3 times daily (before meals)  Fely Roman MD   nitroGLYCERIN (NITROSTAT) 0.4 MG SL tablet DISSOLVE 1 TABLET UNDER THE TONGUE AS NEEDED FOR CHEST PAIN EVERY 5 MINUTES UP TO 3 TIMES. IF NO RELIEF CALL 911.   Fely Roman MD   sennosides-docusate sodium (SENOKOT-S) 8.6-50 MG tablet Take 1 tablet by mouth daily  Jossie Boone MD   pravastatin (PRAVACHOL) 80 MG tablet TAKE 1 TABLET BY MOUTH ONCE DAILY  Noa Mendez MD   clopidogrel (PLAVIX) 75 MG tablet TAKE 1 TABLET BY MOUTH ONCE DAILY  Nao Mendez MD   hydrOXYzine (VISTARIL) 50 MG capsule TAKE 1 TO 2 CAPSULES BY MOUTH NIGHTLY  Fely Roman MD   ondansetron (ZOFRAN ODT) 4 MG disintegrating tablet Take 1 tablet by mouth every 8 hours as needed for Nausea  JASE David   calcium acetate (PHOSLO) 667 MG capsule Take 1 capsule by mouth 3 times daily (with meals)  JAY Wallace - CNP   traZODone (DESYREL) 150 MG tablet TAKE (1) Tobacco comment: TRYING TO QUIT 3-7 a day   Substance Use Topics    Alcohol use: Not Currently     Alcohol/week: 0.0 standard drinks     Comment: occ    Drug use: No          Allergies   Allergen Reactions    Morphine Nausea And Vomiting   ,   Past Medical History:   Diagnosis Date    Ambulatory dysfunction     walker for long distances, SOB with distance    Aortic stenosis     echo 2017    Arthritis     hands and hips    Asthma     Bilateral hilar adenopathy syndrome 6/3/2013    CAD (coronary artery disease)     Dr. Rivas Carpio [facility]) 04/19/2019    EF= 43%    CH[facility]congest[facility] heart failure) ([facility])     Chronic pain     COPD (chronic obstructive pulmonary disease) ([address]     pulmonology Dr. Franca Stein    Depression     Diabetes mellitus ([address]     borderline    Difficult intravenous access     Emphysema of lung ([address]     ESRD (end stage renal disease) on dialysis ([address]     MWF    Fear of needles     Gastric ulcer     GERD (gastroesophageal reflux disease)     Heart valve problem     bicuspic valve    Hemodialysis patient ([address]     History of spinal fracture     work incident    Hx of blood clots     Bilateral lower extremities; stents in place    Hyperlipidemia     Hypertension     MI (myocardial infarction) ([address] 2019    has had 9 MIs. 2019 was the last    Neuromuscular disorder ([address]     due to CVA    Numbness and tingling in left arm     from fistula    Pneumonia     PONV (postoperative nausea and vomiting)     Prolonged emergence from general anesthesia     States requires more medication than most people    Sleep apnea     Uses CPAP    Stroke ([address]     7mm thalamic cva 2017 deficts left side, left side weakness    TIA (transient ischemic attack)     Unspecified diseases of blood and blood-forming organs    ,   Past Surgical History:   Procedure Laterality Date    AORTIC VALVE REPLACEMENT N/A 10/15/2019    TRANSCATHETER AORTIC VALVE REPLACEMENT FEMORAL APPROACH performed by Kellie Garcia MD at [address] Right 2019    PERITONEAL DIALYSIS CATHETER [facility]      WNL    CORONARY ANGIOPLASTY WITH STENT PLACEMENT  05/26/15    CYST REMOVAL  2013    EXCISION CYSTS, NECK X2 AND ABDOMINAL benign    DIAGNOSTIC CARDIAC CATH LAB PROCEDURE      DIALYSIS FISTULA CREATION Left 10/30/2017    LEFT BRACHIAL CEPHALIC FISTULA    DIALYSIS FISTULA CREATION Left 3/27/2019    LIGATION  AV FISTULA performed by Hodan Bob MD at [address] [facility], DIAGNOSTIC      OTHER SURGICAL HISTORY  2017    laparoscopic cholecystectomy with intraoperative cholangiogram    OTHER SURGICAL HISTORY  2018    PORT PLACEMENT  - vas cath    OTHER SURGICAL HISTORY Bilateral 2018    laprascopic peritoneal dialysis catheter placement    OTHER SURGICAL HISTORY Right 2018    peritoneal dialysis port placed on right side of abdomen    OTHER SURGICAL HISTORY  2019    PTA/Stenting R External Iliac artery    [place] LAP INSERTION TUNNELED INTRAPERITONEAL CATHETER N/A 2018    LAPAROSCOPIC PERITONEAL DIALYSIS CATHETER REPLACEMENT performed by Ce Nicole MD at [address]  2016    UPPER GASTROINTESTINAL ENDOSCOPY  2017    possible candida, otherwise normal appearing    VASCULAR SURGERY  aprx 2 years ago    2 stents placed, each side of groin   ,   Social History     Tobacco Use    Smoking status: Former Smoker     Packs/day: 1.00     Years: 33.00     Pack years: 33.00     Types: Cigarettes     Last attempt to quit: 2020     Years since quittin.0    Smokeless tobacco: Never Used    Tobacco comment: TRYING TO QUIT 3-7 a day   Substance Use Topics    Alcohol use: Not Currently     Alcohol/week: 0.0 standard drinks     Comment: occ    Drug use: No   ,   Family

## 2020-05-27 RX ORDER — GABAPENTIN 100 MG/1
CAPSULE ORAL
Qty: 540 CAPSULE | Refills: 10 | Status: ON HOLD | OUTPATIENT
Start: 2020-05-27 | End: 2020-12-18

## 2020-06-04 ENCOUNTER — APPOINTMENT (OUTPATIENT)
Dept: GENERAL RADIOLOGY | Age: 50
DRG: 640 | End: 2020-06-04
Payer: COMMERCIAL

## 2020-06-04 ENCOUNTER — HOSPITAL ENCOUNTER (INPATIENT)
Age: 50
LOS: 6 days | Discharge: HOME HEALTH | DRG: 640 | End: 2020-06-10
Attending: EMERGENCY MEDICINE | Admitting: PEDIATRICS
Payer: COMMERCIAL

## 2020-06-04 ENCOUNTER — APPOINTMENT (OUTPATIENT)
Dept: CT IMAGING | Age: 50
DRG: 640 | End: 2020-06-04
Payer: COMMERCIAL

## 2020-06-04 LAB
A/G RATIO: 1.2 (ref 1.1–2.2)
ALBUMIN SERPL-MCNC: 3.6 G/DL (ref 3.4–5)
ALP BLD-CCNC: 105 U/L (ref 40–129)
ALT SERPL-CCNC: 15 U/L (ref 10–40)
ANION GAP SERPL CALCULATED.3IONS-SCNC: 12 MMOL/L (ref 3–16)
AST SERPL-CCNC: 29 U/L (ref 15–37)
BASOPHILS ABSOLUTE: 0.1 K/UL (ref 0–0.2)
BASOPHILS RELATIVE PERCENT: 0.4 %
BILIRUB SERPL-MCNC: <0.2 MG/DL (ref 0–1)
BUN BLDV-MCNC: 89 MG/DL (ref 7–20)
CALCIUM SERPL-MCNC: 8.6 MG/DL (ref 8.3–10.6)
CHLORIDE BLD-SCNC: 85 MMOL/L (ref 99–110)
CO2: 24 MMOL/L (ref 21–32)
CREAT SERPL-MCNC: 6.3 MG/DL (ref 0.9–1.3)
EOSINOPHILS ABSOLUTE: 0.3 K/UL (ref 0–0.6)
EOSINOPHILS RELATIVE PERCENT: 2.2 %
GFR AFRICAN AMERICAN: 11
GFR NON-AFRICAN AMERICAN: 9
GLOBULIN: 2.9 G/DL
GLUCOSE BLD-MCNC: 361 MG/DL
GLUCOSE BLD-MCNC: 361 MG/DL (ref 70–99)
GLUCOSE BLD-MCNC: 476 MG/DL (ref 70–99)
GLUCOSE BLD-MCNC: 493 MG/DL (ref 70–99)
GLUCOSE BLD-MCNC: 503 MG/DL
GLUCOSE BLD-MCNC: 503 MG/DL (ref 70–99)
HCT VFR BLD CALC: 31.4 % (ref 40.5–52.5)
HEMOGLOBIN: 10.2 G/DL (ref 13.5–17.5)
LYMPHOCYTES ABSOLUTE: 1.1 K/UL (ref 1–5.1)
LYMPHOCYTES RELATIVE PERCENT: 8.7 %
MCH RBC QN AUTO: 30.6 PG (ref 26–34)
MCHC RBC AUTO-ENTMCNC: 32.4 G/DL (ref 31–36)
MCV RBC AUTO: 94.4 FL (ref 80–100)
MONOCYTES ABSOLUTE: 0.6 K/UL (ref 0–1.3)
MONOCYTES RELATIVE PERCENT: 4.7 %
NEUTROPHILS ABSOLUTE: 10.4 K/UL (ref 1.7–7.7)
NEUTROPHILS RELATIVE PERCENT: 84 %
PDW BLD-RTO: 17.5 % (ref 12.4–15.4)
PERFORMED ON: ABNORMAL
PLATELET # BLD: 307 K/UL (ref 135–450)
PMV BLD AUTO: 9.1 FL (ref 5–10.5)
POTASSIUM REFLEX MAGNESIUM: 7.1 MMOL/L (ref 3.5–5.1)
RBC # BLD: 3.33 M/UL (ref 4.2–5.9)
REASON FOR REJECTION: NORMAL
REJECTED TEST: NORMAL
SODIUM BLD-SCNC: 121 MMOL/L (ref 136–145)
TOTAL PROTEIN: 6.5 G/DL (ref 6.4–8.2)
TROPONIN: 0.1 NG/ML
WBC # BLD: 12.4 K/UL (ref 4–11)

## 2020-06-04 PROCEDURE — 6370000000 HC RX 637 (ALT 250 FOR IP): Performed by: PHYSICIAN ASSISTANT

## 2020-06-04 PROCEDURE — 96374 THER/PROPH/DIAG INJ IV PUSH: CPT

## 2020-06-04 PROCEDURE — 2700000000 HC OXYGEN THERAPY PER DAY

## 2020-06-04 PROCEDURE — 6370000000 HC RX 637 (ALT 250 FOR IP): Performed by: EMERGENCY MEDICINE

## 2020-06-04 PROCEDURE — 5A1D70Z PERFORMANCE OF URINARY FILTRATION, INTERMITTENT, LESS THAN 6 HOURS PER DAY: ICD-10-PCS | Performed by: INTERNAL MEDICINE

## 2020-06-04 PROCEDURE — 71045 X-RAY EXAM CHEST 1 VIEW: CPT

## 2020-06-04 PROCEDURE — 73560 X-RAY EXAM OF KNEE 1 OR 2: CPT

## 2020-06-04 PROCEDURE — 6360000002 HC RX W HCPCS: Performed by: PHYSICIAN ASSISTANT

## 2020-06-04 PROCEDURE — 2500000003 HC RX 250 WO HCPCS: Performed by: PHYSICIAN ASSISTANT

## 2020-06-04 PROCEDURE — 85025 COMPLETE CBC W/AUTO DIFF WBC: CPT

## 2020-06-04 PROCEDURE — 72131 CT LUMBAR SPINE W/O DYE: CPT

## 2020-06-04 PROCEDURE — 99291 CRITICAL CARE FIRST HOUR: CPT

## 2020-06-04 PROCEDURE — 93005 ELECTROCARDIOGRAM TRACING: CPT | Performed by: PHYSICIAN ASSISTANT

## 2020-06-04 PROCEDURE — 96375 TX/PRO/DX INJ NEW DRUG ADDON: CPT

## 2020-06-04 PROCEDURE — 80053 COMPREHEN METABOLIC PANEL: CPT

## 2020-06-04 PROCEDURE — 87040 BLOOD CULTURE FOR BACTERIA: CPT

## 2020-06-04 PROCEDURE — 2580000003 HC RX 258: Performed by: PHYSICIAN ASSISTANT

## 2020-06-04 PROCEDURE — 84484 ASSAY OF TROPONIN QUANT: CPT

## 2020-06-04 PROCEDURE — 94761 N-INVAS EAR/PLS OXIMETRY MLT: CPT

## 2020-06-04 PROCEDURE — 2060000000 HC ICU INTERMEDIATE R&B

## 2020-06-04 PROCEDURE — 94640 AIRWAY INHALATION TREATMENT: CPT

## 2020-06-04 RX ORDER — ONDANSETRON 2 MG/ML
4 INJECTION INTRAMUSCULAR; INTRAVENOUS
Status: ACTIVE | OUTPATIENT
Start: 2020-06-04 | End: 2020-06-04

## 2020-06-04 RX ORDER — ASPIRIN 81 MG/1
324 TABLET, CHEWABLE ORAL ONCE
Status: COMPLETED | OUTPATIENT
Start: 2020-06-04 | End: 2020-06-04

## 2020-06-04 RX ORDER — MIDODRINE HYDROCHLORIDE 5 MG/1
5 TABLET ORAL
Status: DISCONTINUED | OUTPATIENT
Start: 2020-06-04 | End: 2020-06-10 | Stop reason: HOSPADM

## 2020-06-04 RX ORDER — DEXTROSE MONOHYDRATE 25 G/50ML
25 INJECTION, SOLUTION INTRAVENOUS ONCE
Status: DISCONTINUED | OUTPATIENT
Start: 2020-06-04 | End: 2020-06-04

## 2020-06-04 RX ORDER — NICOTINE POLACRILEX 4 MG
15 LOZENGE BUCCAL PRN
Status: DISCONTINUED | OUTPATIENT
Start: 2020-06-04 | End: 2020-06-05 | Stop reason: SDUPTHER

## 2020-06-04 RX ORDER — DEXTROSE MONOHYDRATE 25 G/50ML
12.5 INJECTION, SOLUTION INTRAVENOUS PRN
Status: DISCONTINUED | OUTPATIENT
Start: 2020-06-04 | End: 2020-06-05 | Stop reason: SDUPTHER

## 2020-06-04 RX ORDER — CALCIUM GLUCONATE 94 MG/ML
1 INJECTION, SOLUTION INTRAVENOUS ONCE
Status: COMPLETED | OUTPATIENT
Start: 2020-06-04 | End: 2020-06-04

## 2020-06-04 RX ORDER — HEPARIN SODIUM 1000 [USP'U]/ML
2000 INJECTION, SOLUTION INTRAVENOUS; SUBCUTANEOUS ONCE
Status: DISCONTINUED | OUTPATIENT
Start: 2020-06-04 | End: 2020-06-10 | Stop reason: HOSPADM

## 2020-06-04 RX ORDER — MIDODRINE HYDROCHLORIDE 5 MG/1
5 TABLET ORAL ONCE
Status: COMPLETED | OUTPATIENT
Start: 2020-06-04 | End: 2020-06-05

## 2020-06-04 RX ORDER — TIZANIDINE 4 MG/1
TABLET ORAL
Qty: 90 TABLET | Refills: 10 | Status: ON HOLD | OUTPATIENT
Start: 2020-06-04 | End: 2021-04-27

## 2020-06-04 RX ORDER — DEXTROSE MONOHYDRATE 50 MG/ML
100 INJECTION, SOLUTION INTRAVENOUS PRN
Status: DISCONTINUED | OUTPATIENT
Start: 2020-06-04 | End: 2020-06-05 | Stop reason: SDUPTHER

## 2020-06-04 RX ORDER — 0.9 % SODIUM CHLORIDE 0.9 %
250 INTRAVENOUS SOLUTION INTRAVENOUS ONCE
Status: COMPLETED | OUTPATIENT
Start: 2020-06-04 | End: 2020-06-04

## 2020-06-04 RX ORDER — HEPARIN SODIUM 1000 [USP'U]/ML
500 INJECTION, SOLUTION INTRAVENOUS; SUBCUTANEOUS ONCE
Status: DISCONTINUED | OUTPATIENT
Start: 2020-06-04 | End: 2020-06-10 | Stop reason: HOSPADM

## 2020-06-04 RX ADMIN — INSULIN HUMAN 10 UNITS: 100 INJECTION, SOLUTION PARENTERAL at 21:23

## 2020-06-04 RX ADMIN — SODIUM BICARBONATE 50 MEQ: 84 INJECTION INTRAVENOUS at 21:23

## 2020-06-04 RX ADMIN — ASPIRIN 81 MG 324 MG: 81 TABLET ORAL at 22:18

## 2020-06-04 RX ADMIN — SODIUM CHLORIDE 250 ML: 9 INJECTION, SOLUTION INTRAVENOUS at 21:23

## 2020-06-04 RX ADMIN — CALCIUM GLUCONATE 1 G: 98 INJECTION, SOLUTION INTRAVENOUS at 21:23

## 2020-06-04 RX ADMIN — ALBUTEROL SULFATE 10 MG: 2.5 SOLUTION RESPIRATORY (INHALATION) at 22:40

## 2020-06-04 RX ADMIN — HYDROMORPHONE HYDROCHLORIDE 0.5 MG: 1 INJECTION, SOLUTION INTRAMUSCULAR; INTRAVENOUS; SUBCUTANEOUS at 20:36

## 2020-06-04 ASSESSMENT — PAIN SCALES - GENERAL
PAINLEVEL_OUTOF10: 10
PAINLEVEL_OUTOF10: 10
PAINLEVEL_OUTOF10: 7
PAINLEVEL_OUTOF10: 8

## 2020-06-04 ASSESSMENT — PAIN DESCRIPTION - PAIN TYPE: TYPE: ACUTE PAIN

## 2020-06-04 ASSESSMENT — PAIN DESCRIPTION - LOCATION: LOCATION: GENERALIZED

## 2020-06-04 NOTE — ED NOTES
Bed: 06  Expected date:   Expected time:   Means of arrival:   Comments:  28 sil Boss RN  06/04/20 1283

## 2020-06-05 PROBLEM — N18.6 ESRD ON PERITONEAL DIALYSIS (HCC): Status: RESOLVED | Noted: 2018-11-09 | Resolved: 2020-06-05

## 2020-06-05 PROBLEM — Z99.2 ESRD ON PERITONEAL DIALYSIS (HCC): Status: RESOLVED | Noted: 2018-11-09 | Resolved: 2020-06-05

## 2020-06-05 LAB
ANION GAP SERPL CALCULATED.3IONS-SCNC: 10 MMOL/L (ref 3–16)
BACTERIA: ABNORMAL /HPF
BASOPHILS ABSOLUTE: 0.1 K/UL (ref 0–0.2)
BASOPHILS RELATIVE PERCENT: 0.7 %
BILIRUBIN URINE: NEGATIVE
BLOOD, URINE: ABNORMAL
BUN BLDV-MCNC: 42 MG/DL (ref 7–20)
CALCIUM SERPL-MCNC: 8.7 MG/DL (ref 8.3–10.6)
CHLORIDE BLD-SCNC: 94 MMOL/L (ref 99–110)
CLARITY: CLEAR
CO2: 29 MMOL/L (ref 21–32)
COLOR: YELLOW
CREAT SERPL-MCNC: 3.2 MG/DL (ref 0.9–1.3)
EKG ATRIAL RATE: 0 BPM
EKG DIAGNOSIS: NORMAL
EKG Q-T INTERVAL: 462 MS
EKG QRS DURATION: 98 MS
EKG QTC CALCULATION (BAZETT): 417 MS
EKG R AXIS: 51 DEGREES
EKG T AXIS: 72 DEGREES
EKG VENTRICULAR RATE: 49 BPM
EOSINOPHILS ABSOLUTE: 0.2 K/UL (ref 0–0.6)
EOSINOPHILS RELATIVE PERCENT: 1.8 %
ESTIMATED AVERAGE GLUCOSE: 257.5 MG/DL
GFR AFRICAN AMERICAN: 25
GFR NON-AFRICAN AMERICAN: 20
GLUCOSE BLD-MCNC: 196 MG/DL (ref 70–99)
GLUCOSE BLD-MCNC: 242 MG/DL (ref 70–99)
GLUCOSE BLD-MCNC: 291 MG/DL (ref 70–99)
GLUCOSE BLD-MCNC: 364 MG/DL (ref 70–99)
GLUCOSE BLD-MCNC: 379 MG/DL (ref 70–99)
GLUCOSE BLD-MCNC: 383 MG/DL
GLUCOSE BLD-MCNC: 383 MG/DL (ref 70–99)
GLUCOSE BLD-MCNC: 388 MG/DL (ref 70–99)
GLUCOSE URINE: 250 MG/DL
HBA1C MFR BLD: 10.6 %
HBV SURFACE AB TITR SER: 5.16 MIU/ML
HCT VFR BLD CALC: 30 % (ref 40.5–52.5)
HEMOGLOBIN: 10.2 G/DL (ref 13.5–17.5)
HEPATITIS B SURFACE ANTIGEN INTERPRETATION: NORMAL
KETONES, URINE: NEGATIVE MG/DL
LEUKOCYTE ESTERASE, URINE: NEGATIVE
LYMPHOCYTES ABSOLUTE: 0.9 K/UL (ref 1–5.1)
LYMPHOCYTES RELATIVE PERCENT: 8.8 %
MCH RBC QN AUTO: 31.4 PG (ref 26–34)
MCHC RBC AUTO-ENTMCNC: 34 G/DL (ref 31–36)
MCV RBC AUTO: 92.3 FL (ref 80–100)
MICROSCOPIC EXAMINATION: YES
MONOCYTES ABSOLUTE: 0.6 K/UL (ref 0–1.3)
MONOCYTES RELATIVE PERCENT: 5.2 %
NEUTROPHILS ABSOLUTE: 9 K/UL (ref 1.7–7.7)
NEUTROPHILS RELATIVE PERCENT: 83.5 %
NITRITE, URINE: NEGATIVE
PDW BLD-RTO: 17 % (ref 12.4–15.4)
PERFORMED ON: ABNORMAL
PH UA: 5.5 (ref 5–8)
PLATELET # BLD: 280 K/UL (ref 135–450)
PMV BLD AUTO: 8.5 FL (ref 5–10.5)
POTASSIUM REFLEX MAGNESIUM: 3.7 MMOL/L (ref 3.5–5.1)
PROTEIN UA: 100 MG/DL
RBC # BLD: 3.25 M/UL (ref 4.2–5.9)
RBC UA: ABNORMAL /HPF (ref 0–4)
SODIUM BLD-SCNC: 133 MMOL/L (ref 136–145)
SPECIFIC GRAVITY UA: 1.02 (ref 1–1.03)
TOTAL CK: 167 U/L (ref 39–308)
TROPONIN: 0.09 NG/ML
TSH SERPL DL<=0.05 MIU/L-ACNC: 0.9 UIU/ML (ref 0.27–4.2)
URINE REFLEX TO CULTURE: YES
URINE TYPE: ABNORMAL
UROBILINOGEN, URINE: 0.2 E.U./DL
WBC # BLD: 10.7 K/UL (ref 4–11)
WBC UA: ABNORMAL /HPF (ref 0–5)

## 2020-06-05 PROCEDURE — 6360000002 HC RX W HCPCS: Performed by: NURSE PRACTITIONER

## 2020-06-05 PROCEDURE — 6370000000 HC RX 637 (ALT 250 FOR IP): Performed by: NURSE PRACTITIONER

## 2020-06-05 PROCEDURE — 84443 ASSAY THYROID STIM HORMONE: CPT

## 2020-06-05 PROCEDURE — 2580000003 HC RX 258: Performed by: NURSE PRACTITIONER

## 2020-06-05 PROCEDURE — 6370000000 HC RX 637 (ALT 250 FOR IP): Performed by: INTERNAL MEDICINE

## 2020-06-05 PROCEDURE — 87086 URINE CULTURE/COLONY COUNT: CPT

## 2020-06-05 PROCEDURE — 81001 URINALYSIS AUTO W/SCOPE: CPT

## 2020-06-05 PROCEDURE — 84484 ASSAY OF TROPONIN QUANT: CPT

## 2020-06-05 PROCEDURE — 90935 HEMODIALYSIS ONE EVALUATION: CPT

## 2020-06-05 PROCEDURE — 94150 VITAL CAPACITY TEST: CPT

## 2020-06-05 PROCEDURE — 36415 COLL VENOUS BLD VENIPUNCTURE: CPT

## 2020-06-05 PROCEDURE — 6360000002 HC RX W HCPCS: Performed by: INTERNAL MEDICINE

## 2020-06-05 PROCEDURE — 2700000000 HC OXYGEN THERAPY PER DAY

## 2020-06-05 PROCEDURE — 93010 ELECTROCARDIOGRAM REPORT: CPT | Performed by: INTERNAL MEDICINE

## 2020-06-05 PROCEDURE — 94761 N-INVAS EAR/PLS OXIMETRY MLT: CPT

## 2020-06-05 PROCEDURE — 97162 PT EVAL MOD COMPLEX 30 MIN: CPT

## 2020-06-05 PROCEDURE — 86706 HEP B SURFACE ANTIBODY: CPT

## 2020-06-05 PROCEDURE — 1200000000 HC SEMI PRIVATE

## 2020-06-05 PROCEDURE — 87340 HEPATITIS B SURFACE AG IA: CPT

## 2020-06-05 PROCEDURE — 85025 COMPLETE CBC W/AUTO DIFF WBC: CPT

## 2020-06-05 PROCEDURE — 80048 BASIC METABOLIC PNL TOTAL CA: CPT

## 2020-06-05 PROCEDURE — 83036 HEMOGLOBIN GLYCOSYLATED A1C: CPT

## 2020-06-05 PROCEDURE — 94640 AIRWAY INHALATION TREATMENT: CPT

## 2020-06-05 PROCEDURE — 82550 ASSAY OF CK (CPK): CPT

## 2020-06-05 PROCEDURE — 97530 THERAPEUTIC ACTIVITIES: CPT

## 2020-06-05 PROCEDURE — 99222 1ST HOSP IP/OBS MODERATE 55: CPT | Performed by: INTERNAL MEDICINE

## 2020-06-05 PROCEDURE — 6370000000 HC RX 637 (ALT 250 FOR IP): Performed by: PEDIATRICS

## 2020-06-05 PROCEDURE — 97166 OT EVAL MOD COMPLEX 45 MIN: CPT

## 2020-06-05 PROCEDURE — 94660 CPAP INITIATION&MGMT: CPT

## 2020-06-05 RX ORDER — ALBUTEROL SULFATE 2.5 MG/3ML
2.5 SOLUTION RESPIRATORY (INHALATION) EVERY 4 HOURS PRN
Status: DISCONTINUED | OUTPATIENT
Start: 2020-06-05 | End: 2020-06-05

## 2020-06-05 RX ORDER — TRAZODONE HYDROCHLORIDE 50 MG/1
150 TABLET ORAL NIGHTLY
Status: DISCONTINUED | OUTPATIENT
Start: 2020-06-05 | End: 2020-06-10 | Stop reason: HOSPADM

## 2020-06-05 RX ORDER — LOSARTAN POTASSIUM 100 MG/1
50 TABLET ORAL DAILY
Status: DISCONTINUED | OUTPATIENT
Start: 2020-06-05 | End: 2020-06-10 | Stop reason: HOSPADM

## 2020-06-05 RX ORDER — SODIUM CHLORIDE 0.9 % (FLUSH) 0.9 %
10 SYRINGE (ML) INJECTION PRN
Status: DISCONTINUED | OUTPATIENT
Start: 2020-06-05 | End: 2020-06-10 | Stop reason: HOSPADM

## 2020-06-05 RX ORDER — PRAVASTATIN SODIUM 80 MG/1
80 TABLET ORAL DAILY
Status: DISCONTINUED | OUTPATIENT
Start: 2020-06-05 | End: 2020-06-10 | Stop reason: HOSPADM

## 2020-06-05 RX ORDER — HYDRALAZINE HYDROCHLORIDE 25 MG/1
25 TABLET, FILM COATED ORAL EVERY 8 HOURS SCHEDULED
Status: DISCONTINUED | OUTPATIENT
Start: 2020-06-05 | End: 2020-06-10 | Stop reason: HOSPADM

## 2020-06-05 RX ORDER — INSULIN GLARGINE 100 [IU]/ML
30 INJECTION, SOLUTION SUBCUTANEOUS NIGHTLY
Status: DISCONTINUED | OUTPATIENT
Start: 2020-06-05 | End: 2020-06-10 | Stop reason: HOSPADM

## 2020-06-05 RX ORDER — PANTOPRAZOLE SODIUM 40 MG/1
40 TABLET, DELAYED RELEASE ORAL
Status: DISCONTINUED | OUTPATIENT
Start: 2020-06-05 | End: 2020-06-10 | Stop reason: HOSPADM

## 2020-06-05 RX ORDER — HEPARIN SODIUM 5000 [USP'U]/ML
5000 INJECTION, SOLUTION INTRAVENOUS; SUBCUTANEOUS EVERY 8 HOURS SCHEDULED
Status: DISCONTINUED | OUTPATIENT
Start: 2020-06-05 | End: 2020-06-10 | Stop reason: HOSPADM

## 2020-06-05 RX ORDER — SODIUM CHLORIDE 0.9 % (FLUSH) 0.9 %
10 SYRINGE (ML) INJECTION EVERY 12 HOURS SCHEDULED
Status: DISCONTINUED | OUTPATIENT
Start: 2020-06-05 | End: 2020-06-10 | Stop reason: HOSPADM

## 2020-06-05 RX ORDER — NYSTATIN 100000 U/G
CREAM TOPICAL 2 TIMES DAILY
Status: DISCONTINUED | OUTPATIENT
Start: 2020-06-05 | End: 2020-06-10 | Stop reason: HOSPADM

## 2020-06-05 RX ORDER — DEXTROSE MONOHYDRATE 50 MG/ML
100 INJECTION, SOLUTION INTRAVENOUS PRN
Status: DISCONTINUED | OUTPATIENT
Start: 2020-06-05 | End: 2020-06-10 | Stop reason: HOSPADM

## 2020-06-05 RX ORDER — QUETIAPINE FUMARATE 25 MG/1
50 TABLET, FILM COATED ORAL EVERY EVENING
Status: DISCONTINUED | OUTPATIENT
Start: 2020-06-05 | End: 2020-06-10 | Stop reason: HOSPADM

## 2020-06-05 RX ORDER — CLOPIDOGREL BISULFATE 75 MG/1
75 TABLET ORAL DAILY
Status: DISCONTINUED | OUTPATIENT
Start: 2020-06-05 | End: 2020-06-10 | Stop reason: HOSPADM

## 2020-06-05 RX ORDER — ALBUTEROL SULFATE 2.5 MG/3ML
2.5 SOLUTION RESPIRATORY (INHALATION)
Status: DISCONTINUED | OUTPATIENT
Start: 2020-06-05 | End: 2020-06-10 | Stop reason: HOSPADM

## 2020-06-05 RX ORDER — HEPARIN SODIUM 1000 [USP'U]/ML
4000 INJECTION, SOLUTION INTRAVENOUS; SUBCUTANEOUS PRN
Status: DISCONTINUED | OUTPATIENT
Start: 2020-06-05 | End: 2020-06-10 | Stop reason: HOSPADM

## 2020-06-05 RX ORDER — OXYCODONE AND ACETAMINOPHEN 7.5; 325 MG/1; MG/1
1 TABLET ORAL EVERY 4 HOURS PRN
Status: DISCONTINUED | OUTPATIENT
Start: 2020-06-05 | End: 2020-06-10 | Stop reason: HOSPADM

## 2020-06-05 RX ORDER — NICOTINE POLACRILEX 4 MG
15 LOZENGE BUCCAL PRN
Status: DISCONTINUED | OUTPATIENT
Start: 2020-06-05 | End: 2020-06-10 | Stop reason: HOSPADM

## 2020-06-05 RX ORDER — ACETAMINOPHEN 650 MG/1
650 SUPPOSITORY RECTAL EVERY 6 HOURS PRN
Status: DISCONTINUED | OUTPATIENT
Start: 2020-06-05 | End: 2020-06-10 | Stop reason: HOSPADM

## 2020-06-05 RX ORDER — POLYETHYLENE GLYCOL 3350 17 G/17G
17 POWDER, FOR SOLUTION ORAL DAILY PRN
Status: DISCONTINUED | OUTPATIENT
Start: 2020-06-05 | End: 2020-06-10 | Stop reason: HOSPADM

## 2020-06-05 RX ORDER — ONDANSETRON 2 MG/ML
4 INJECTION INTRAMUSCULAR; INTRAVENOUS EVERY 6 HOURS PRN
Status: DISCONTINUED | OUTPATIENT
Start: 2020-06-05 | End: 2020-06-10 | Stop reason: HOSPADM

## 2020-06-05 RX ORDER — DEXTROSE MONOHYDRATE 25 G/50ML
12.5 INJECTION, SOLUTION INTRAVENOUS PRN
Status: DISCONTINUED | OUTPATIENT
Start: 2020-06-05 | End: 2020-06-10 | Stop reason: HOSPADM

## 2020-06-05 RX ORDER — GABAPENTIN 100 MG/1
100 CAPSULE ORAL NIGHTLY
Status: DISCONTINUED | OUTPATIENT
Start: 2020-06-06 | End: 2020-06-10 | Stop reason: HOSPADM

## 2020-06-05 RX ORDER — METOPROLOL SUCCINATE 25 MG/1
25 TABLET, EXTENDED RELEASE ORAL DAILY
Status: DISCONTINUED | OUTPATIENT
Start: 2020-06-05 | End: 2020-06-10 | Stop reason: HOSPADM

## 2020-06-05 RX ORDER — ASPIRIN 81 MG/1
81 TABLET, CHEWABLE ORAL DAILY
Status: DISCONTINUED | OUTPATIENT
Start: 2020-06-05 | End: 2020-06-10 | Stop reason: HOSPADM

## 2020-06-05 RX ORDER — PROMETHAZINE HYDROCHLORIDE 25 MG/1
12.5 TABLET ORAL EVERY 6 HOURS PRN
Status: DISCONTINUED | OUTPATIENT
Start: 2020-06-05 | End: 2020-06-10 | Stop reason: HOSPADM

## 2020-06-05 RX ORDER — ACETAMINOPHEN 325 MG/1
650 TABLET ORAL EVERY 6 HOURS PRN
Status: DISCONTINUED | OUTPATIENT
Start: 2020-06-05 | End: 2020-06-10 | Stop reason: HOSPADM

## 2020-06-05 RX ORDER — MAGNESIUM HYDROXIDE/ALUMINUM HYDROXICE/SIMETHICONE 120; 1200; 1200 MG/30ML; MG/30ML; MG/30ML
30 SUSPENSION ORAL ONCE
Status: COMPLETED | OUTPATIENT
Start: 2020-06-05 | End: 2020-06-05

## 2020-06-05 RX ORDER — HYDROMORPHONE HCL 110MG/55ML
1 PATIENT CONTROLLED ANALGESIA SYRINGE INTRAVENOUS ONCE
Status: COMPLETED | OUTPATIENT
Start: 2020-06-05 | End: 2020-06-05

## 2020-06-05 RX ORDER — OXYCODONE AND ACETAMINOPHEN 7.5; 325 MG/1; MG/1
7.5 TABLET ORAL EVERY 4 HOURS PRN
Status: ON HOLD | COMMUNITY
End: 2020-06-10 | Stop reason: HOSPADM

## 2020-06-05 RX ORDER — CALCIUM CARBONATE 200(500)MG
500 TABLET,CHEWABLE ORAL 3 TIMES DAILY PRN
Status: DISCONTINUED | OUTPATIENT
Start: 2020-06-05 | End: 2020-06-10 | Stop reason: HOSPADM

## 2020-06-05 RX ORDER — GABAPENTIN 100 MG/1
100 CAPSULE ORAL 3 TIMES DAILY
Status: DISCONTINUED | OUTPATIENT
Start: 2020-06-05 | End: 2020-06-05

## 2020-06-05 RX ORDER — DULOXETIN HYDROCHLORIDE 30 MG/1
30 CAPSULE, DELAYED RELEASE ORAL DAILY
Status: DISCONTINUED | OUTPATIENT
Start: 2020-06-05 | End: 2020-06-10 | Stop reason: HOSPADM

## 2020-06-05 RX ADMIN — Medication 10 ML: at 08:56

## 2020-06-05 RX ADMIN — GLYCOPYRROLATE AND FORMOTEROL FUMARATE 2 PUFF: 9; 4.8 AEROSOL, METERED RESPIRATORY (INHALATION) at 20:08

## 2020-06-05 RX ADMIN — HEPARIN SODIUM 5000 UNITS: 5000 INJECTION INTRAVENOUS; SUBCUTANEOUS at 15:05

## 2020-06-05 RX ADMIN — GLYCOPYRROLATE AND FORMOTEROL FUMARATE 2 PUFF: 9; 4.8 AEROSOL, METERED RESPIRATORY (INHALATION) at 08:40

## 2020-06-05 RX ADMIN — HEPARIN SODIUM 4000 UNITS: 1000 INJECTION INTRAVENOUS; SUBCUTANEOUS at 03:48

## 2020-06-05 RX ADMIN — DULOXETINE HYDROCHLORIDE 30 MG: 30 CAPSULE, DELAYED RELEASE ORAL at 08:56

## 2020-06-05 RX ADMIN — TRAZODONE HYDROCHLORIDE 150 MG: 50 TABLET ORAL at 01:38

## 2020-06-05 RX ADMIN — LOSARTAN POTASSIUM 50 MG: 100 TABLET, FILM COATED ORAL at 08:56

## 2020-06-05 RX ADMIN — OXYCODONE HYDROCHLORIDE AND ACETAMINOPHEN 1 TABLET: 7.5; 325 TABLET ORAL at 08:56

## 2020-06-05 RX ADMIN — PANTOPRAZOLE SODIUM 40 MG: 40 TABLET, DELAYED RELEASE ORAL at 17:21

## 2020-06-05 RX ADMIN — Medication 10 ML: at 21:24

## 2020-06-05 RX ADMIN — CALCIUM ACETATE 667 MG: 667 CAPSULE ORAL at 11:58

## 2020-06-05 RX ADMIN — OXYCODONE HYDROCHLORIDE AND ACETAMINOPHEN 1 TABLET: 7.5; 325 TABLET ORAL at 03:00

## 2020-06-05 RX ADMIN — GABAPENTIN 100 MG: 100 CAPSULE ORAL at 08:56

## 2020-06-05 RX ADMIN — INSULIN LISPRO 15 UNITS: 100 INJECTION, SOLUTION INTRAVENOUS; SUBCUTANEOUS at 17:22

## 2020-06-05 RX ADMIN — HEPARIN SODIUM 5000 UNITS: 5000 INJECTION INTRAVENOUS; SUBCUTANEOUS at 20:11

## 2020-06-05 RX ADMIN — INSULIN LISPRO 7 UNITS: 100 INJECTION, SOLUTION INTRAVENOUS; SUBCUTANEOUS at 20:11

## 2020-06-05 RX ADMIN — INSULIN GLARGINE 30 UNITS: 100 INJECTION, SOLUTION SUBCUTANEOUS at 20:11

## 2020-06-05 RX ADMIN — HYDROMORPHONE HYDROCHLORIDE 1 MG: 2 INJECTION, SOLUTION INTRAMUSCULAR; INTRAVENOUS; SUBCUTANEOUS at 21:25

## 2020-06-05 RX ADMIN — ALBUTEROL SULFATE 2.5 MG: 2.5 SOLUTION RESPIRATORY (INHALATION) at 15:39

## 2020-06-05 RX ADMIN — CALCIUM ACETATE 667 MG: 667 CAPSULE ORAL at 08:56

## 2020-06-05 RX ADMIN — HYDRALAZINE HYDROCHLORIDE 25 MG: 25 TABLET, FILM COATED ORAL at 15:03

## 2020-06-05 RX ADMIN — MIDODRINE HYDROCHLORIDE 5 MG: 5 TABLET ORAL at 04:03

## 2020-06-05 RX ADMIN — ALBUTEROL SULFATE 2.5 MG: 2.5 SOLUTION RESPIRATORY (INHALATION) at 11:33

## 2020-06-05 RX ADMIN — MIDODRINE HYDROCHLORIDE 5 MG: 5 TABLET ORAL at 00:06

## 2020-06-05 RX ADMIN — ALBUTEROL SULFATE 2.5 MG: 2.5 SOLUTION RESPIRATORY (INHALATION) at 20:08

## 2020-06-05 RX ADMIN — ALUMINUM HYDROXIDE, MAGNESIUM HYDROXIDE, AND SIMETHICONE 30 ML: 200; 200; 20 SUSPENSION ORAL at 01:38

## 2020-06-05 RX ADMIN — CLOPIDOGREL BISULFATE 75 MG: 75 TABLET ORAL at 08:56

## 2020-06-05 RX ADMIN — ALBUTEROL SULFATE 2.5 MG: 2.5 SOLUTION RESPIRATORY (INHALATION) at 08:36

## 2020-06-05 RX ADMIN — OXYCODONE HYDROCHLORIDE AND ACETAMINOPHEN 1 TABLET: 7.5; 325 TABLET ORAL at 20:06

## 2020-06-05 RX ADMIN — NYSTATIN: 100000 CREAM TOPICAL at 20:13

## 2020-06-05 RX ADMIN — HYDRALAZINE HYDROCHLORIDE 25 MG: 25 TABLET, FILM COATED ORAL at 20:11

## 2020-06-05 RX ADMIN — INSULIN LISPRO 6 UNITS: 100 INJECTION, SOLUTION INTRAVENOUS; SUBCUTANEOUS at 01:18

## 2020-06-05 RX ADMIN — PANTOPRAZOLE SODIUM 40 MG: 40 TABLET, DELAYED RELEASE ORAL at 06:03

## 2020-06-05 RX ADMIN — INSULIN LISPRO 15 UNITS: 100 INJECTION, SOLUTION INTRAVENOUS; SUBCUTANEOUS at 11:58

## 2020-06-05 RX ADMIN — METOPROLOL SUCCINATE 25 MG: 50 TABLET, EXTENDED RELEASE ORAL at 08:56

## 2020-06-05 RX ADMIN — NYSTATIN: 100000 CREAM TOPICAL at 11:58

## 2020-06-05 RX ADMIN — ASPIRIN 81 MG 81 MG: 81 TABLET ORAL at 08:56

## 2020-06-05 RX ADMIN — ANTACID TABLETS 500 MG: 500 TABLET, CHEWABLE ORAL at 00:36

## 2020-06-05 RX ADMIN — PRAVASTATIN SODIUM 80 MG: 80 TABLET ORAL at 17:21

## 2020-06-05 RX ADMIN — TRAZODONE HYDROCHLORIDE 150 MG: 50 TABLET ORAL at 20:10

## 2020-06-05 RX ADMIN — HEPARIN SODIUM 5000 UNITS: 5000 INJECTION INTRAVENOUS; SUBCUTANEOUS at 06:03

## 2020-06-05 RX ADMIN — QUETIAPINE FUMARATE 50 MG: 25 TABLET ORAL at 17:21

## 2020-06-05 RX ADMIN — OXYCODONE HYDROCHLORIDE AND ACETAMINOPHEN 1 TABLET: 7.5; 325 TABLET ORAL at 15:03

## 2020-06-05 RX ADMIN — INSULIN LISPRO 9 UNITS: 100 INJECTION, SOLUTION INTRAVENOUS; SUBCUTANEOUS at 08:57

## 2020-06-05 RX ADMIN — CALCIUM ACETATE 667 MG: 667 CAPSULE ORAL at 17:21

## 2020-06-05 ASSESSMENT — PAIN DESCRIPTION - LOCATION
LOCATION: BACK;HIP

## 2020-06-05 ASSESSMENT — PAIN SCALES - GENERAL
PAINLEVEL_OUTOF10: 10
PAINLEVEL_OUTOF10: 8
PAINLEVEL_OUTOF10: 8
PAINLEVEL_OUTOF10: 9
PAINLEVEL_OUTOF10: 10
PAINLEVEL_OUTOF10: 7
PAINLEVEL_OUTOF10: 9
PAINLEVEL_OUTOF10: 10
PAINLEVEL_OUTOF10: 10
PAINLEVEL_OUTOF10: 9
PAINLEVEL_OUTOF10: 7
PAINLEVEL_OUTOF10: 9
PAINLEVEL_OUTOF10: 8
PAINLEVEL_OUTOF10: 9

## 2020-06-05 ASSESSMENT — ENCOUNTER SYMPTOMS
EYES NEGATIVE: 1
BACK PAIN: 1
COLOR CHANGE: 0
ABDOMINAL PAIN: 0
SHORTNESS OF BREATH: 1
COUGH: 0

## 2020-06-05 ASSESSMENT — PAIN DESCRIPTION - DESCRIPTORS: DESCRIPTORS: ACHING

## 2020-06-05 ASSESSMENT — PAIN - FUNCTIONAL ASSESSMENT: PAIN_FUNCTIONAL_ASSESSMENT: PREVENTS OR INTERFERES SOME ACTIVE ACTIVITIES AND ADLS

## 2020-06-05 ASSESSMENT — PAIN DESCRIPTION - PAIN TYPE
TYPE: ACUTE PAIN;CHRONIC PAIN

## 2020-06-05 ASSESSMENT — PAIN SCALES - WONG BAKER: WONGBAKER_NUMERICALRESPONSE: 6

## 2020-06-05 ASSESSMENT — PAIN DESCRIPTION - ORIENTATION
ORIENTATION: RIGHT;LEFT
ORIENTATION: RIGHT;LEFT;LOWER
ORIENTATION: RIGHT;LEFT

## 2020-06-05 NOTE — H&P
Hospital Medicine History & Physical      PCP: Belinda Barnes MD    Date of Admission: 6/4/2020    Date of Service: Pt seen/examined on 6/4/2020 and Admitted to Inpatient with expected LOS greater than two midnights due to medical therapy. Chief Complaint:  fall      History Of Present Illness:      44 y.o. male, with PMH of ESRD, obesity, DM, HTN and CAD, who presented to [facility] with a fall. History obtained from the patient and review of EMR. The patient stated he has not been feeling well for the last few days with generalized weakness. He stated he cannot walk more than 5 feet and he falls due to his knees buckling under him. He states that he does use a walker while ambulating, but he feels so weak and loses his balance. The patient denies hitting his head and/or loss of consciousness. He stated he does have end-stage renal disease and receives HD on Monday, Wednesday and Friday. The patient stated he did skip on Wednesday due to his weakness and because he had a dentist appointment. Throughout the patient's work-up in the emergency room he was found to have a potassium of 7.1. He was given D50, insulin, calcium and bicarb. The patient did have a CT of his lumbar spine that revealed no acute abnormality. He was also complaining of left knee pain and had an x-ray that showed no acute osseous abnormality. Nephrology was consulted in the emergency room and the patient will receive emergent dialysis. He will be admitted for further evaluation. The patient denied any other associated symptoms as well as any aggravating and/or alleviating factors. At the time of this assessment, the patient was resting comfortably in bed. He currently denies any chest pain, back pain, abdominal pain, shortness of breath, numbness, tingling, N/V/C/D, fever and/or chills.      Past Medical History:          Diagnosis Date    Ambulatory dysfunction     walker for long distances, SOB with distance    Aortic  DIAGNOSTIC CARDIAC CATH LAB PROCEDURE      DIALYSIS FISTULA CREATION Left 10/30/2017    LEFT BRACHIAL CEPHALIC FISTULA    DIALYSIS FISTULA CREATION Left 3/27/2019    LIGATION  AV FISTULA performed by Sarkis Galeana MD at [facility]. [address], COLON, DIAGNOSTIC      OTHER SURGICAL HISTORY  02/01/2017    laparoscopic cholecystectomy with intraoperative cholangiogram    OTHER SURGICAL HISTORY  2018    PORT PLACEMENT  - vas cath    OTHER SURGICAL HISTORY Bilateral 06/26/2018    laprascopic peritoneal dialysis catheter placement    OTHER SURGICAL HISTORY Right 09/2018    peritoneal dialysis port placed on right side of abdomen    OTHER SURGICAL HISTORY  05/28/2019    PTA/Stenting R External Iliac artery    [place] LAP INSERTION TUNNELED INTRAPERITONEAL CATHETER N/A 9/21/2018    LAPAROSCOPIC PERITONEAL DIALYSIS CATHETER REPLACEMENT performed by Stephanie Carrillo MD at [address] [place] [address]  01/06/2016    UPPER GASTROINTESTINAL ENDOSCOPY  01/29/2017    possible candida, otherwise normal appearing    VASCULAR SURGERY  aprx 2 years ago    2 stents placed, each side of groin     Medications Prior to Admission:      Prior to Admission medications    Medication Sig Start Date End Date Taking? Authorizing Provider   oxyCODONE-acetaminophen (PERCOCET) 7.5-325 MG per tablet Take 7.5 mg by mouth every 4 hours as needed.     Historical Provider, MD   tiZANidine (ZANAFLEX) 4 MG tablet TAKE 1 TABLET BY MOUTH THREE TIMES DAILY 6/4/20   Itzel Alvarez MD   BASAGLAR KWIKPEN 100 UNIT/ML injection pen INJECT 30 UNITS INTO THE SKIN EVERY NIGHT 6/1/20   Itzel Alvarez MD   gabapentin (NEURONTIN) 100 MG capsule TAKE 1 TO 2 CAPSULES BY MOUTH THREE TIMES A DAY 5/27/20 6/26/20  Itzel Alvarez MD   metoprolol succinate (TOPROL XL) 25 MG extended release tablet Take 1 tablet by mouth daily 5/26/20   Michele Tran MD   fluconazole (DIFLUCAN) 150 MG tablet TAKE 1 TABLET BY MOUTH 1 TIME FOR 1 DOSE 5/14/20   Joseluis Townsend MD   B Complex-C-Folic Acid (VIRT-CAPS) 1 MG CAPS TK ONE C PO  QD 4/21/20   Joseluis Townsend MD   QUEtiapine (SEROQUEL) 50 MG tablet Take 1 tablet by mouth every evening 4/21/20   Joseluis Townsend MD   DULoxetine (CYMBALTA) 30 MG extended release capsule TAKE 1 CAPSULE BY MOUTH EVERY DAY 4/3/20   Ponce Ahumada, APRN - CNP   nystatin-triamcinolone (MYCOLOG II) 568817-4.3 UNIT/GM-% cream Apply topically 2 times daily. 3/16/20   JAY Louis CNP   losartan (COZAAR) 50 MG tablet TAKE (1) TABLET BY MOUTH DAILY 3/10/20   Joseluis Townsend MD   pantoprazole (PROTONIX) 40 MG tablet TAKE (1) TABLET BY MOUTH EACH MORNING BEFORE BREAKFAST 3/10/20   Joseluis Townsend MD   albuterol (PROVENTIL) (2.5 MG/3ML) 0.083% nebulizer solution INHALE 1 VIAL VIA NEBULIZER EVERY 6 HOURS AS NEEDED FOR WHEEZING 3/10/20   Joseluis Townsend MD   hydrALAZINE (APRESOLINE) 50 MG tablet Take 0.5 tablets by mouth every 8 hours 3/10/20   JAY Evans CNP   nystatin (MYCOSTATIN) 368260 UNIT/GM cream Apply topically 2 times daily. 3/4/20   Joseluis Townsend MD   insulin lispro (HUMALOG) 100 UNIT/ML injection vial Inject 10 Units into the skin 3 times daily (before meals)  Patient not taking: Reported on 5/26/2020 3/4/20   Joseluis Townsend MD   Insulin Syringe-Needle U-100 30G X 1/2\" 0.5 ML MISC 1 each by Does not apply route daily 3/4/20   Joseluis Townsend MD   insulin aspart (NOVOLOG FLEXPEN) 100 UNIT/ML injection pen Inject 10 Units into the skin 3 times daily (before meals) 3/4/20   Joseluis Townsend MD   nitroGLYCERIN (NITROSTAT) 0.4 MG SL tablet DISSOLVE 1 TABLET UNDER THE TONGUE AS NEEDED FOR CHEST PAIN EVERY 5 MINUTES UP TO 3 TIMES.  IF NO RELIEF CALL 911. 2/7/20   Medora Pry, MD   sennosides-docusate sodium (SENOKOT-S) 8.6-50 MG tablet Take 1 tablet by mouth daily 2/4/20   Nabil Oneill MD   pravastatin (PRAVACHOL) 80 MG tablet TAKE 1 TABLET BY MOUTH ONCE DAILY 1/10/20   Mary Ann Min MD clopidogrel (PLAVIX) 75 MG tablet TAKE 1 TABLET BY MOUTH ONCE DAILY 1/10/20   Cynthia Borrego MD   hydrOXYzine (VISTARIL) 50 MG capsule TAKE 1 TO 2 CAPSULES BY MOUTH NIGHTLY 12/4/19   Sal Prieto MD   ondansetron (ZOFRAN ODT) 4 MG disintegrating tablet Take 1 tablet by mouth every 8 hours as needed for Nausea 11/6/19   JASE Wilson   calcium acetate (PHOSLO) 667 MG capsule Take 1 capsule by mouth 3 times daily (with meals) 10/23/19   JAY Black - CNP   traZODone (DESYREL) 150 MG tablet TAKE (1) TABLET BY MOUTH NIGHTLY 10/9/19   Sal Prieto MD   ACCU-CHEK FASTCLIX LANCETS MISC TEST 3-4 TIMES DAILY, AS DIRECTED 8/19/19   Sal Prieto MD   blood glucose test strips (FREESTYLE LITE) strip Daily As needed.  8/19/19   Sal Prieto MD   glucose monitoring kit (FREESTYLE) monitoring kit 1 kit by Does not apply route daily 8/19/19   Sal Prieto MD   vitamin D (ERGOCALCIFEROL) 14168 units CAPS capsule TK 1 C PO WEEKLY 6/2/19   Historical Provider, MD   Pediatric Multivitamins-Fl (MULTI VIT/FL) 0.25 MG CHEW Take 25 mg by mouth daily 5/30/19   Sal Prieto MD   flunisolide (NASALIDE) 25 MCG/ACT (0.025%) SOLN Inhale 2 sprays into the lungs every 12 hours 5/22/19   Becky Vicente MD   Tiotropium Bromide-Olodaterol (STIOLTO RESPIMAT) 2.5-2.5 MCG/ACT AERS Inhale 2 puffs into the lungs daily 5/21/19   Becky Vicente MD   Polyethylene Glycol 3350 GRAN  5/2/18   Historical Provider, MD   Glucose Blood (BLOOD GLUCOSE TEST STRIPS) STRP TEST 3-4 TIMES DAILY, AS DIRECTED 4/25/18   Juanjose Flores MD   Blood Glucose Monitoring Suppl ADAM USE AS DIRECTED. 4/25/18   Juanjose Flores MD   Alcohol Swabs PADS USE AS DIRECTED 4/25/18   Juanjose Flores MD   albuterol sulfate  (90 Base) MCG/ACT inhaler Inhale 2 puffs into the lungs every 6 hours as needed for Wheezing 11/8/17   Tamara Hernandes MD   ipratropium-albuterol (DUONEB) 0.5-2.5 (3) MG/3ML SOLN nebulizer solution Inhale 3 mLs into the lungs every 6 hours as needed for Shortness of Breath 10/15/17   Malcolm Becerril MD   Lancets MISC Test daily 5/25/17   Gen Jimenes MD   calcium carbonate (TUMS) 500 MG chewable tablet Take 1 tablet by mouth 3 times daily as needed for Heartburn. Historical Provider, MD   aspirin 81 MG chewable tablet Take 1 tablet by mouth daily. Patient taking differently: Take 81 mg by mouth daily Indications: stopped on 6/25 for surgery  5/14/13   Silviano Puga MD     Allergies:  Morphine    Social History:      The patient currently lives at home    TOBACCO:   reports that he quit smoking about 5 weeks ago. His smoking use included cigarettes. He has a 33.00 pack-year smoking history. He has never used smokeless tobacco.  ETOH:   reports previous alcohol use. E-Cigarettes Vaping or Juuling     Questions Responses    Vaping Use Never User    Start Date     Does device contain nicotine? Never    Quit Date     Vaping Type         Family History:      Reviewed in detail. Positive as follows:        Problem Relation Age of Onset    Diabetes Mother     Heart Disease Father     Kidney Disease Sister         stage 4-kidney failure    Cancer Sister     Heart Disease Sister     Obesity Sister     Cancer Sister     Heart Disease Sister     Obesity Sister     Alcohol Abuse Brother      REVIEW OF SYSTEMS:   Pertinent positives as noted in the HPI. All other systems reviewed and negative. PHYSICAL EXAM PERFORMED:    /75   Pulse 54   Temp 98 °F (36.7 °C) (Oral)   Resp 16   Ht 5' 9\" (1.753 m)   Wt 253 lb (114.8 kg)   SpO2 96%   BMI 37.36 kg/m²     General appearance:  Pleasant, obese male in no apparent distress, appears stated age and cooperative. HEENT:  Pupils equal, round, and reactive to light. Extra ocular muscles intact. Conjunctivae/corneas clear. Neck: Supple, with full range of motion. No jugular venous distention. Trachea midline. Respiratory:  Normal respiratory effort.  Clear to

## 2020-06-05 NOTE — PROGRESS NOTES
Occupational Therapy   Occupational Therapy Initial Assessment    Date: 2020   Patient Name: Roseanne Anderson  MRN: [id number]     : 1970    Date of Service: 2020    Discharge Recommendations:  IP Rehab(pending progress with safe transfers)  OT Equipment Recommendations  Equipment Needed: Yes  Mobility Devices: ADL Assistive Devices; Wheelchair  Wheelchair: (electric scooter/w/c due to cardiorespiratory dysfunction )  ADL Assistive Devices: Sock-Aid Hard;Long-handled Shoe Horn;Reacher;Elastic Shoe Laces    Assessment   Performance deficits / Impairments: Decreased functional mobility ; Decreased balance;Decreased ADL status; Decreased high-level IADLs;Decreased endurance  Assessment: pt normally independent with basic ADL's & functional mobility with 4 WW, now with frequent falls & poor tolerance sitting; would benefit from skilled OT services while in acute care setting  OT Education: OT Role;Precautions  REQUIRES OT FOLLOW UP: Yes  Activity Tolerance  Activity Tolerance: Patient limited by pain  Safety Devices  Safety Devices in place: Yes  Type of devices: Call light within reach; Left in bed           Patient Diagnosis(es): The primary encounter diagnosis was Hyperkalemia. Diagnoses of End stage renal disease on dialysis [facility]), Generalized weakness, Frequent falls, Abrasion, left knee, initial encounter, Contusion of left knee, initial encounter, Acute midline low back pain without sciatica, and Elevated troponin were also pertinent to this visit.      has a past medical history of Ambulatory dysfunction, Aortic stenosis, Arthritis, Asthma, Bilateral hilar adenopathy syndrome, CAD (coronary artery disease), Cardiomyopathy ([address] CHF (congestive heart failure) ([address] Chronic pain, COPD (chronic obstructive pulmonary disease) ([address] Depression, Diabetes mellitus ([address] Difficult intravenous access, Emphysema of lung ([address] ESRD (end stage renal disease) on dialysis ([address] Fear of needles, Gastric Normotonic    Coordination  Movements Are Fluid And Coordinated: Yes     Bed mobility  Supine to Sit: Minimal assistance  Sit to Supine: Minimal assistance  Scooting: Contact guard assistance  Transfers  Sit to stand: Minimal assistance  Stand to sit: Contact guard assistance with use of bed rails for support    Vision - Basic Assessment  Prior Vision: Wears glasses only for reading     Cognition  Overall Cognitive Status: WFL    LUE AROM : WFL  RUE AROM : WFL    Gross LUE Strength: WFL  Gross RUE Strength: WFL       Plan   Plan  Times per week: 3-5x/ week   Current Treatment Recommendations: Strengthening, Balance Training, Wheelchair Mobility Training, Safety Education & Training, Self-Care / ADL, Endurance Training, Patient/Caregiver Education & Training, Equipment Evaluation, Education, & procurement, Functional Mobility Training      AM-PAC Score        AM-PAC Inpatient Daily Activity Raw Score: 16 (06/05/20 0849)  AM-PAC Inpatient ADL T-Scale Score : 35.96 (06/05/20 0849)  ADL Inpatient CMS 0-100% Score: 53.32 (06/05/20 0849)  ADL Inpatient CMS G-Code Modifier : CK (06/05/20 0849)    Goals  Short term goals  Time Frame for Short term goals: 1 week(6-12-20)  Short term goal 1: SBA with w/c transfers by 6-12-20  Short term goal 2: set up only for LE dressing with AE by 6-10-20  Short term goal 3: tolerate 15 - 20 reps UE strengthening  Short term goal 4: tolerate sitting in chair for 30 minutes at a time for ADL's by 6-12-20  Patient Goals   Patient goals : less pain to get around & find out what's wrong       Therapy Time   Individual Concurrent Group Co-treatment   Time In [facility] Tanya 13         Time Out 1233 [address]         Minutes 12[address]

## 2020-06-05 NOTE — ED NOTES
Called [facility] @ 2120  RE: chronic kidney failure, hyperkalemia, hyponatremia  Dr. Hoyt Sample called back @ 2131       Shahid River  06/04/20 2140

## 2020-06-05 NOTE — ED PROVIDER NOTES
[facility]  ED  EMERGENCY DEPARTMENT ENCOUNTER        Pt Name: Jaden Echevarria  MRN: [id number]  [place] 1970  Date of evaluation: 6/4/2020  Provider: Javon Toledo PA-C  PCP: Loulou Saldaña MD  ED Attending: Leander Dodson DO      This patient was seen by the attending provider Leander Dodson DO    History provided by the patient    CHIEF COMPLAINT:     Chief Complaint   Patient presents with   Donnal Moat     states he cant walk more than 5 feet and he falls. states has fallen 5 times in one week. pt states takes blood thinner. denies hitting head, hips, lower back, right thigh pain        HISTORY OF PRESENT ILLNESS:      Jaden Echevarria is a 44 y.o. male who arrives to the ED by [facility]. The patient is here reporting progressive weakness and frequent falls over the last 2 weeks. He states he cannot take more than a few steps before his legs buckle under him. He is fallen several times in the last week. He reports low back pain and left knee pain from falls. He denies any head injury or neck injury. He denies any chest pain or abdominal pain. Patient does report some shortness of breath. He is a dialysis patient. He typically goes every Monday, Wednesday and Friday. He skipped Wednesday this week because he had a dentist appointment. Nursing Notes were reviewed     REVIEW OF SYSTEMS:     Review of Systems   Constitutional: Negative for appetite change and fever. HENT: Negative. Eyes: Negative. Respiratory: Positive for shortness of breath. Negative for cough. Cardiovascular: Negative for chest pain. Gastrointestinal: Negative for abdominal pain. Genitourinary: Negative. Musculoskeletal: Positive for arthralgias, back pain, gait problem, myalgias and neck pain. Skin: Negative for color change. Neurological: Negative for dizziness and headaches. All other systems reviewed and are negative.       Except as noted above in the ROS, all other systems Geovani Montes MD at [address] [place] [address]  2/29/2015    WNL    CORONARY ANGIOPLASTY WITH STENT PLACEMENT  05/26/15    CYST REMOVAL  08/14/2013    EXCISION CYSTS, NECK X2 AND ABDOMINAL benign    DIAGNOSTIC CARDIAC CATH LAB PROCEDURE      DIALYSIS FISTULA CREATION Left 10/30/2017    LEFT BRACHIAL CEPHALIC FISTULA    DIALYSIS FISTULA CREATION Left 3/27/2019    LIGATION  AV FISTULA performed by Meghan Timmons MD at [facility], COLON, DIAGNOSTIC      OTHER SURGICAL HISTORY  02/01/2017    laparoscopic cholecystectomy with intraoperative cholangiogram    OTHER SURGICAL HISTORY  2018    PORT PLACEMENT  - vas cath    OTHER SURGICAL HISTORY Bilateral 06/26/2018    laprascopic peritoneal dialysis catheter placement    OTHER SURGICAL HISTORY Right 09/2018    peritoneal dialysis port placed on right side of abdomen    OTHER SURGICAL HISTORY  05/28/2019    PTA/Stenting R External Iliac artery    CA LAP INSERTION TUNNELED INTRAPERITONEAL CATHETER N/A 9/21/2018    LAPAROSCOPIC PERITONEAL DIALYSIS CATHETER REPLACEMENT performed by Livia Nieves MD at [address] [place] [address]  01/06/2016    UPPER GASTROINTESTINAL ENDOSCOPY  01/29/2017    possible candida, otherwise normal appearing    VASCULAR SURGERY  aprx 2 years ago    2 stents placed, each side of groin         CURRENT MEDICATIONS:       Previous Medications    ACCU-CHEK FASTCLIX LANCETS MISC    TEST 3-4 TIMES DAILY, AS DIRECTED    ALBUTEROL (PROVENTIL) (2.5 MG/3ML) 0.083% NEBULIZER SOLUTION    INHALE 1 VIAL VIA NEBULIZER EVERY 6 HOURS AS NEEDED FOR WHEEZING    ALBUTEROL SULFATE  (90 BASE) MCG/ACT INHALER    Inhale 2 puffs into the lungs every 6 hours as needed for Wheezing    ALCOHOL SWABS PADS    USE AS DIRECTED    ASPIRIN 81 MG CHEWABLE TABLET    Take 1 tablet by mouth daily.     B COMPLEX-C-FOLIC ACID (VIRT-CAPS) 1 MG CAPS    TK ONE C PO  QD    BASAGLAR KWIKPEN 100 UNIT/ML Medical: None     Non-medical: None   Tobacco Use    Smoking status: Former Smoker     Packs/day: 1.00     Years: 33.00     Pack years: 33.00     Types: Cigarettes     Last attempt to quit: 2020     Years since quittin.1    Smokeless tobacco: Never Used    Tobacco comment: TRYING TO QUIT 3-7 a day   Substance and Sexual Activity    Alcohol use: Not Currently     Alcohol/week: 0.0 standard drinks     Comment: occ    Drug use: No    Sexual activity: Yes     Partners: Female     Comment:    Lifestyle    Physical activity     Days per week: None     Minutes per session: None    Stress: None   Relationships    Social connections     Talks on phone: None     Gets together: None     Attends Sabianist service: None     Active member of club or organization: None     Attends meetings of clubs or organizations: None     Relationship status: None    Intimate partner violence     Fear of current or ex partner: None     Emotionally abused: None     Physically abused: None     Forced sexual activity: None   Other Topics Concern    None   Social History Narrative    None       SCREENINGS:    Robert Coma Scale  Eye Opening: Spontaneous  Best Verbal Response: Oriented  Best Motor Response: Obeys commands  [place] Coma Scale Score: 15        PHYSICAL EXAM:       ED Triage Vitals   BP Temp Temp Source Pulse Resp SpO2 Height Weight   20 1857 20 1857 20 1857 20 18520 18520 185 -- 20 1855   (!) 83/51 98.6 °F (37 °C) Oral (!) 42 16 95 %  253 lb (114.8 kg)     Body mass index is 37.36 kg/m². Physical Exam    CONSTITUTIONAL: Awake and alert. Cooperative. Well-developed. Well-nourished. Non-toxic. No acute distress. HENT: Normocephalic. Atraumatic. External ears normal, without discharge. No nasal discharge. Oropharynx clear. Mucous membranes moist.  EYES: Conjunctiva non-injected. No scleral icterus. PERRL. EOM's grossly intact. NECK: Supple. Normal ROM. American 9 (A) >60    GFR  11 (A) >60    Calcium 8.6 8.3 - 10.6 mg/dL    Total Protein 6.5 6.4 - 8.2 g/dL    Alb 3.6 3.4 - 5.0 g/dL    Albumin/Globulin Ratio 1.2 1.1 - 2.2    Total Bilirubin <0.2 0.0 - 1.0 mg/dL    Alkaline Phosphatase 105 40 - 129 U/L    ALT 15 10 - 40 U/L    AST 29 15 - 37 U/L    Globulin 2.9 g/dL   Troponin   Result Value Ref Range    Troponin 0.10 (H) <0.01 ng/mL   POCT Glucose   Result Value Ref Range    POC Glucose 493 (H) 70 - 99 mg/dl    Performed on ACCU-CHEK    POCT Glucose   Result Value Ref Range    Glucose 503 mg/dL   POCT Glucose   Result Value Ref Range    Glucose 361 mg/dL   POCT Glucose   Result Value Ref Range    Glucose 383 mg/dL   POCT Glucose   Result Value Ref Range    POC Glucose 503 (H) 70 - 99 mg/dl    Performed on ACCU-CHEK    POCT Glucose   Result Value Ref Range    POC Glucose 361 (H) 70 - 99 mg/dl    Performed on ACCU-CHEK    POCT Glucose   Result Value Ref Range    POC Glucose 383 (H) 70 - 99 mg/dl    Performed on ACCU-CHEK          RADIOLOGY:  All x-ray studies areviewed/reviewed by me. Formal interpretations per the radiologist are as follows:      Xr Knee Left (1-2 Views)    Result Date: 6/4/2020  EXAMINATION: XRAY VIEWS OF THE LEFT KNEE 6/4/2020 9:55 pm COMPARISON: Knee x-rays dated 04/25/2020. HISTORY: ORDERING SYSTEM PROVIDED HISTORY: fall, pain TECHNOLOGIST PROVIDED HISTORY: Reason for exam:->fall, pain Reason for Exam: pain Acuity: Acute Type of Exam: Initial FINDINGS: There is no evidence of acute fracture or dislocation. There is normal bone mineralization. There is normal alignment. There is no joint effusion. There is no focal soft tissue swelling. Vascular stent is noted. No acute osseous abnormality.      Ct Lumbar Spine Wo Contrast    Result Date: 6/4/2020  EXAMINATION: CT OF THE LUMBAR SPINE WITHOUT CONTRAST  6/4/2020 TECHNIQUE: CT of the lumbar spine was performed without the administration of intravenous contrast. Multiplanar hyperkalemia requiring emergent dialysis, I spent 32 minutes providing critical care. This time is excluding time spent performing procedures. CONSULTS:  IP CONSULT TO NEPHROLOGY  IP CONSULT TO HOSPITALIST  IP CONSULT TO NEPHROLOGY      EMERGENCY DEPARTMENT COURSE and DIFFERENTIAL DIAGNOSIS/MDM:   Vitals:    Vitals:    06/04/20 2319 06/04/20 2333 06/04/20 2342 06/04/20 2347   BP: (!) 101/38 84/74 (!) 68/50 94/71   Pulse: 53 56 54 56   Resp: 18 18 17 17   Temp:       TempSrc:       SpO2: 95% 95% 98% 96%   Weight:           Patient was given the following medications:  Medications   glucose (GLUTOSE) 40 % oral gel 15 g (has no administration in time range)   dextrose 50 % IV solution (has no administration in time range)   glucagon (rDNA) injection 1 mg (has no administration in time range)   dextrose 5 % solution (has no administration in time range)   heparin (porcine) injection 2,000 Units (has no administration in time range)   heparin (porcine) injection 500 Units (has no administration in time range)   midodrine (PROAMATINE) tablet 5 mg (has no administration in time range)   ondansetron (ZOFRAN) injection 4 mg (has no administration in time range)   HYDROmorphone (DILAUDID) injection 0.5 mg (0.5 mg Intravenous Given 6/4/20 2036)   albuterol (PROVENTIL) nebulizer solution 10 mg (10 mg Nebulization Given 6/4/20 2240)   calcium gluconate 10 % injection 1 g (1 g Intravenous Given 6/4/20 2123)   sodium bicarbonate 8.4 % injection 50 mEq (50 mEq Intravenous Given 6/4/20 2123)   insulin regular (HUMULIN R;NOVOLIN R) injection 10 Units (10 Units Intravenous Given 6/4/20 2123)   0.9 % sodium chloride bolus (0 mLs Intravenous Stopped 6/4/20 2246)   aspirin chewable tablet 324 mg (324 mg Oral Given 6/4/20 2218)   midodrine (PROAMATINE) tablet 5 mg (5 mg Oral Given 6/5/20 0006)         Patient was evaluated by both myself and Adrian Abreu DO. Old records were reviewed.    Patient arrives by ambulance

## 2020-06-05 NOTE — PROGRESS NOTES
may differ): Greater than or equal to 92% on room air = 0    Peak Flow (asthma only): not applicable = 0    RSI: 7-8 = BID and Q4HPRN (every four hours as needed) for dyspnea        Plan       Goals: medication delivery and volume expansion    Patient/caregiver was educated on the proper method of use for Respiratory Care Devices:  Yes      Level of patient/caregiver understanding able to:   ? Verbalize understanding   ? Demonstrate understanding       ? Teach back        ? Needs reinforcement       ? No available caregiver               ? Other:     Response to education:  Good     Is patient being placed on Home Treatment Regimen? Yes     Does the patient have everything they need prior to discharge? NA     Comments: Patient assessed    Plan of Care: Albuterol Q4 Wa    Electronically signed by Roxane Dias RCP on 6/5/2020 at 4:11 AM    Respiratory Protocol Guidelines     1. Assessment and treatment by Respiratory Therapy will be initiated for medication and therapeutic interventions upon initiation of aerosolized medication. 2. Physician will be contacted for respiratory rate (RR) greater than 35 breaths per minute. Therapy will be held for heart rate (HR) greater than 140 beats per minute, pending direction from physician. 3. Bronchodilators will be administered via Metered Dose Inhaler (MDI) with spacer when the following criteria are met:  a. Alert and cooperative     b. HR < 140 bpm  c. RR < 30 bpm                d. Can demonstrate a 2-3 second inspiratory hold  4. Bronchodilators will be administered via Hand Held Nebulizer [facility]) to patients when ANY of the following criteria are met  a. Incognizant or uncooperative          b. Patients treated with HHN at Home        c. Unable to demonstrate proper use of MDI with spacer     d. RR > 30 bpm   5. Bronchodilators will be delivered via Metered Dose Inhaler (MDI), HHN, Aerogen to intubated patients on mechanical ventilation.   6. Inhalation medication

## 2020-06-05 NOTE — PROGRESS NOTES
2900 ml   Net -2000 ml       Physical Exam Performed:    /77   Pulse 85   Temp 97.4 °F (36.3 °C) (Oral)   Resp 18   Ht 5' 9\" (1.753 m)   Wt 274 lb (124.3 kg)   SpO2 96%   BMI 40.46 kg/m²     General appearance: No apparent distress, appears stated age and cooperative. HEENT: Pupils equal, round, and reactive to light. Conjunctivae/corneas clear. Neck: Supple, with full range of motion. No jugular venous distention. Trachea midline. Respiratory:  Normal respiratory effort. Clear to auscultation, bilaterally without Rales/Wheezes/Rhonchi. Cardiovascular: Regular rate and rhythm with normal S1/S2 without murmurs, rubs or gallops. Abdomen: Soft, non-tender, non-distended with normal bowel sounds. Musculoskeletal: No clubbing, cyanosis or edema bilaterally. Full range of motion without deformity. Skin: Skin color, texture, turgor normal.  No rashes or lesions. Neurologic:  Neurovascularly intact without any focal sensory/motor deficits. Cranial nerves: II-XII intact, grossly non-focal.  Psychiatric: Alert and oriented, thought content appropriate, normal insight  Capillary Refill: Brisk,< 3 seconds   Peripheral Pulses: +2 palpable, equal bilaterally       Labs:   Recent Labs     06/04/20  1947 06/05/20  0440   WBC 12.4* 10.7   HGB 10.2* 10.2*   HCT 31.4* 30.0*    280     Recent Labs     06/04/20 2033 06/05/20  0440   * 133*   K 7.1* 3.7   CL 85* 94*   CO2 24 29   BUN 89* 42*   CREATININE 6.3* 3.2*   CALCIUM 8.6 8.7     Recent Labs     06/04/20 2033   AST 29   ALT 15   BILITOT <0.2   ALKPHOS 105     No results for input(s): INR in the last 72 hours.   Recent Labs     06/04/20 2033 06/05/20  0924   CKTOTAL  --  167   TROPONINI 0.10* 0.09*       Urinalysis:      Lab Results   Component Value Date    NITRU Negative 04/25/2020    WBCUA 3-5 04/25/2020    BACTERIA 1+ 03/10/2020    RBCUA None seen 04/25/2020    BLOODU Negative 04/25/2020    SPECGRAV 1.020 04/25/2020    GLUCOSEU >=1000 No signs of infection  White blood cell count normal today. Most likely reactive. Nothing further. Discussed with the patient, questions answered      DVT Prophylaxis: Subcutaneous heparin  Diet: DIET CARB CONTROL;  Code Status: Full Code    PT/OT Eval Status: Requested and in process    Dispo -awaiting cardiology consult and PT OT recommendations. Probably discharge over the next day or 2, once more information is available.     Karly Cervantes MD

## 2020-06-05 NOTE — ED NOTES
Patient arrives via EMS after falling at home. States his legs \"went out\". Has multitude of different complaints ranging from numbness in legs, buttocks pain, dizziness, shortness of breath. Patient requested to be put on nasal cannula. On 1L at this time. Pt is dialysis. States normal days MWF, did not go yesterday states he was planning to go Th and F.       Azul Willingham RN  06/04/20 2023

## 2020-06-05 NOTE — PLAN OF CARE
Problem: Falls - Risk of:  Goal: Will remain free from falls  Description: Will remain free from falls  6/5/2020 0836 by Blanka Astudillo RN  Outcome: Ongoing     Problem: Falls - Risk of:  Goal: Absence of physical injury  Description: Absence of physical injury  6/5/2020 0836 by Blanka Astudillo RN  Outcome: Ongoing     Problem: Pain:  Goal: Control of chronic pain  Description: Control of chronic pain  Outcome: Ongoing  Patient instructed to use call light if assistance is needed. Call light within reach. Bed alarm on.

## 2020-06-05 NOTE — FLOWSHEET NOTE
06/05/20 0203 06/05/20 0540   Vital Signs   BP (!) 137/50 119/64   Temp 98 °F (36.7 °C) 97.4 °F (36.3 °C)   Pulse 56 76   Resp 18 18   Weight 274 lb 11.1 oz (124.6 kg) 267 lb 13.7 oz (121.5 kg)   Weight Method Bed scale  (1 pillow, 1 sheet  and 2 cover sheet left. ) Bed scale  (1 pillow and 1 sheet left.)   Treatment time: 3 hours 17mins. (ordered 3.5hrs.)  Net UF: 2000 ml     Pre weight: 124.6 kg   Post weight: 121.5 kg     Access used: Rt. Chest wall. TDC. Access function: good  with  ml/min     Medications or blood products given: midodrine 5m given. Heparin used only line care. Summary of response to treatment: Tx terminated 13 mins earlier d/t system started to clotted. Low BP in the middle of tx. BP improved after reduced ufg and Midodrine 5mg given. Copy of dialysis treatment record placed in chart, to be scanned into EMR.

## 2020-06-05 NOTE — PROGRESS NOTES
Physical Therapy    Facility/Department: [facility] C4 PCU  Initial Assessment    NAME: Bijal Sosa  : 1970  MRN: [id number]    Date of Service: 2020    Discharge Recommendations:  IP Rehab   PT Equipment Recommendations  Equipment Needed: No(defer)    Assessment    Body structures, Functions, Activity limitations: Decreased functional mobility ; Decreased safe awareness;Decreased balance;Decreased ADL status; Decreased ROM; Decreased endurance; Increased pain;Decreased strength;Decreased posture  Assessment: Patient seen for PT evaluation and transfer training. Patient cleared by RN for therapy participation this date. Patient agreeable to therapy. Patient admitted for falls and hyperkalemia. Patient completed sit<>stand with SW and min A with increased time and cues for hand placement. Pt stood with min A and SW for ~5 seconds, then reported increased dizziness and requested to sit, BP stable. Pt limited by R hip pain and dizziness this session. P.T .will continue to follow throughout LOS. Recommending DC to IP Rehab pending continued progress with therapy as pt requiring increased assistance from baseline and has had several recent falls. Treatment Diagnosis: decreased independencew with transfers  Specific instructions for Next Treatment: progress mobility as tolerated  Prognosis: Good  Decision Making: Medium Complexity  PT Education: Goals;Precautions;Orientation; Functional Mobility Training;PT Role;Transfer Training;Plan of Care;General Safety  Patient Education: pt verbalized understanding  REQUIRES PT FOLLOW UP: Yes  Activity Tolerance  Activity Tolerance: Patient Tolerated treatment well;Patient limited by endurance; Patient limited by fatigue;Patient limited by pain  Activity Tolerance: Pt reported dizziness sitting EOB, /63. BP increased to 173/96 following standing. Patient Diagnosis(es): The primary encounter diagnosis was Hyperkalemia.  Diagnoses of End stage renal disease on Aortic valve replacement (N/A, 10/15/2019). Restrictions  Restrictions/Precautions  Restrictions/Precautions: General Precautions, Fall Risk  Position Activity Restriction  Other position/activity restrictions: High fall risk per nursing assessment, Up as tolerated; telemetry  Vision/Hearing  Vision: Impaired  Vision Exceptions: Wears glasses for reading  Hearing: Within functional limits     Subjective  General  Chart Reviewed: Yes  Patient assessed for rehabilitation services?: Yes  Response To Previous Treatment: Not applicable  Family / Caregiver Present: No  Referring Practitioner: tanya  Referral Date : 06/05/20  Diagnosis: falls, hyperkalemia  Follows Commands: Within Functional Limits  Subjective  Subjective: pt in bed, agreeable to therapy  Pain Screening  Patient Currently in Pain: Yes  Pain Assessment  Pain Assessment: 0-10  Pain Level: 9  Pain Type: Acute pain;Chronic pain  Pain Location: Back; Hip  Pain Orientation: Right;Left  Pain Descriptors: Aching  Vital Signs  Patient Currently in Pain: Yes       Orientation  Orientation  Overall Orientation Status: Within Normal Limits  Social/Functional History  Social/Functional History  Lives With: Spouse  Type of Home: House  Home Layout: One level  Home Access: Ramped entrance  Bathroom Shower/Tub: Walk-in shower, Shower chair with back  Bathroom Toilet: Standard  Bathroom Equipment: Grab bars in shower  Bathroom Accessibility: Wheelchair accessible  Home Equipment: 4 wheeled walker, Standard walker  ADL Assistance: Independent  Homemaking Responsibilities: Yes  Meal Prep Responsibility: Primary  Laundry Responsibility: Primary  Cleaning Responsibility: Secondary  Ambulation Assistance: Independent  Transfer Assistance: Independent(with walker)  Active : No  Occupation: On disability  Type of occupation:   Leisure & Hobbies: fishing  Additional Comments: pt reports at least 10 falls within last 3 mos  Cognition   Cognition  Overall

## 2020-06-05 NOTE — CONSULTS
 Obesity (BMI 30-39. 9)     ZAINAB on CPAP 02/11/2016    Degeneration of lumbar or lumbosacral intervertebral disc 03/18/2016    Lumbar radiculopathy 03/18/2016    Lumbosacral spondylosis without myelopathy 07/48/9551    Biliary colic 85/94/2888    Symptomatic cholelithiasis 01/04/2017    Gastroparesis due to DM ([address]     Angina, class IV (HCC)     Dyspnea     Elevated brain natriuretic peptide (BNP) level     Dyslipidemia     Respiratory distress     Hypoxia     Chest pressure 02/17/2017    Hypertensive urgency 02/17/2017    Acute on chronic combined systolic and diastolic heart failure (HCC)     Ischemic cardiomyopathy     Chronic renal failure, stage 5 ([address] 03/07/2017    Tobacco abuse 05/21/2017    CKD stage 4 due to type 2 diabetes mellitus ([address] 05/21/2017    CVA (cerebral vascular accident) ([address] 05/21/2017    Arterial ischemic stroke, ICA, right, acute ([address]     Type 2 diabetes mellitus without complication, without long-term current use of insulin ([address]     HTN (hypertension), benign     ZAINAB (obstructive sleep apnea)     Diarrhea 08/04/2017    Pleural effusion     Chronic anemia     Nonrheumatic aortic valve stenosis     Hypervolemia     Hyperkalemia     Morbid obesity (HCC)     Mucus plugging of bronchi     Hemodialysis-associated hypotension 11/22/2017    Left arm pain 11/22/2017    Dizziness 11/22/2017    End stage renal disease on dialysis ([address] 11/22/2017    Hypotension due to drugs 11/25/2017    Acute diastolic CHF (congestive heart failure) ([address] 03/18/2018    Neuromuscular disorder (HCC)     Acute combined systolic and diastolic CHF, NYHA class 4 ([address] 09/14/2018    Renovascular hypertension     Mixed hyperlipidemia     Cigarette nicotine dependence in remission     ESRD on peritoneal dialysis ([address] 11/09/2018    Acute respiratory failure ([address] 11/09/2018    Pulmonary edema 11/09/2018    Fluid overload 85/80/9896    Diastolic dysfunction 15/94/5283   

## 2020-06-05 NOTE — CONSULTS
tablets by mouth every 8 hours 90 tablet 0    nystatin (MYCOSTATIN) 356675 UNIT/GM cream Apply topically 2 times daily. 60 g 3    insulin lispro (HUMALOG) 100 UNIT/ML injection vial Inject 10 Units into the skin 3 times daily (before meals) (Patient not taking: Reported on 5/26/2020) 1 vial 5    Insulin Syringe-Needle U-100 30G X 1/2\" 0.5 ML MISC 1 each by Does not apply route daily 100 each 3    insulin aspart (NOVOLOG FLEXPEN) 100 UNIT/ML injection pen Inject 10 Units into the skin 3 times daily (before meals) 5 pen 3    nitroGLYCERIN (NITROSTAT) 0.4 MG SL tablet DISSOLVE 1 TABLET UNDER THE TONGUE AS NEEDED FOR CHEST PAIN EVERY 5 MINUTES UP TO 3 TIMES. IF NO RELIEF CALL 911. 25 tablet 10    sennosides-docusate sodium (SENOKOT-S) 8.6-50 MG tablet Take 1 tablet by mouth daily 10 tablet 0    pravastatin (PRAVACHOL) 80 MG tablet TAKE 1 TABLET BY MOUTH ONCE DAILY 90 tablet 3    clopidogrel (PLAVIX) 75 MG tablet TAKE 1 TABLET BY MOUTH ONCE DAILY 90 tablet 3    hydrOXYzine (VISTARIL) 50 MG capsule TAKE 1 TO 2 CAPSULES BY MOUTH NIGHTLY 180 capsule 10    ondansetron (ZOFRAN ODT) 4 MG disintegrating tablet Take 1 tablet by mouth every 8 hours as needed for Nausea 15 tablet 0    calcium acetate (PHOSLO) 667 MG capsule Take 1 capsule by mouth 3 times daily (with meals) 60 capsule 3    traZODone (DESYREL) 150 MG tablet TAKE (1) TABLET BY MOUTH NIGHTLY 90 tablet 10    ACCU-CHEK FASTCLIX LANCETS MISC TEST 3-4 TIMES DAILY, AS DIRECTED 300 each 3    blood glucose test strips (FREESTYLE LITE) strip Daily As needed.  100 strip 3    glucose monitoring kit (FREESTYLE) monitoring kit 1 kit by Does not apply route daily 1 kit 0    vitamin D (ERGOCALCIFEROL) 92871 units CAPS capsule TK 1 C PO WEEKLY  11    Pediatric Multivitamins-Fl (MULTI VIT/FL) 0.25 MG CHEW Take 25 mg by mouth daily 90 tablet 1    flunisolide (NASALIDE) 25 MCG/ACT (0.025%) SOLN Inhale 2 sprays into the lungs every 12 hours 1 Bottle 5    Tiotropium

## 2020-06-05 NOTE — PROGRESS NOTES
Notified [address], nurse with [facility], @ [phone] 6/5/20 for cardiac consult. -[phone] [facility]

## 2020-06-05 NOTE — CONSULTS
other review of symptoms findings as below.      Review of Systems - History obtained from the patient  General ROS: negative for - chills, fever or night sweats  Psychological ROS: negative for - disorientation or hallucinations  Ophthalmic ROS: negative for - dry eyes, eye pain or loss of vision  ENT ROS: negative for - nasal discharge or sore throat  Allergy and Immunology ROS: negative for - hives or itchy/watery eyes  Hematological and Lymphatic ROS: negative for - jaundice or night sweats  Endocrine ROS: negative for - mood swings or temperature intolerance  Breast ROS: deferred  Respiratory ROS: negative for - hemoptysis or stridor  Gastrointestinal ROS: no abdominal pain, change in bowel habits, or black or bloody stools  Genito-Urinary ROS: no dysuria, trouble voiding, or hematuria  Musculoskeletal ROS: negative for - gait disturbance, joint pain or joint stiffness  Neurological ROS: negative for - seizures or speech problems  Dermatological ROS: negative for - rash or skin lesion changes      Physical Examination:    [unfilled]  BP (!) 165/83   Pulse 77   Temp 97.7 °F (36.5 °C) (Oral)   Resp 18   Ht 5' 9\" (1.753 m)   Wt 274 lb (124.3 kg)   SpO2 99%   BMI 40.46 kg/m²    Weight: 274 lb (124.3 kg)     Wt Readings from Last 3 Encounters:   06/05/20 274 lb (124.3 kg)   05/26/20 265 lb (120.2 kg)   05/06/20 264 lb 15.9 oz (120.2 kg)       Intake/Output Summary (Last 24 hours) at 6/5/2020 1542  Last data filed at 6/5/2020 1458  Gross per 24 hour   Intake 900 ml   Output 3400 ml   Net -2500 ml         General Appearance:  Alert, cooperative, minimal distress, appears stated age   Head:  Normocephalic, without obvious abnormality, atraumatic   Eyes:  PERRL, conjunctiva/corneas clear       Nose: Nares normal, no drainage or sinus tenderness   Throat: Lips, mucosa, and tongue normal   Neck: Supple, symmetrical, trachea midline, no adenopathy, thyroid: not enlarged, symmetric, no tenderness/mass/nodules, no carotid office  [phone] Main central  6/5/2020  3:42 PM

## 2020-06-05 NOTE — ED PROVIDER NOTES
extremity is normal 5/5.  5/5 plantarflexion/dorsiflexion of the right lower extremity. Abrasion noted of the right knee. Tetanus is up-to-date. X-ray of the knee is negative. CT lumbar spine ordered is negative. No red flags to suggest cauda equina syndrome. Cardiac work-up initiated. Patient with worsening kidney failure and hyperkalemia. Hyperkalemia treatment initiated. Nephrology consulted for dialysis tonight. Troponin is elevated so aspirin is initiated. Plan for admission. CRITICAL CARE TIME: 31 minutes excluding billable procedure time: Acute treatment of hyperkalemia, emergent consultation of nephrology, complex work-up, potential deterioration. EKG: EKG interpretation by ED physician: What appears to be junctional rhythm at 49 bpm.  No obvious ischemic ST changes appreciated. FINAL IMPRESSION     1. Hyperkalemia    2. End stage renal disease on dialysis ([facility] [address]    3. Generalized weakness    4. Frequent falls    5. Abrasion, left knee, initial encounter    6. Contusion of left knee, initial encounter    7. Acute midline low back pain without sciatica    8. Elevated troponin            Electronically signed by:   Tani Kamara DO  06/04/20 0646

## 2020-06-06 ENCOUNTER — APPOINTMENT (OUTPATIENT)
Dept: CT IMAGING | Age: 50
DRG: 640 | End: 2020-06-06
Payer: COMMERCIAL

## 2020-06-06 LAB
ANION GAP SERPL CALCULATED.3IONS-SCNC: 13 MMOL/L (ref 3–16)
BASOPHILS ABSOLUTE: 0.1 K/UL (ref 0–0.2)
BASOPHILS RELATIVE PERCENT: 0.7 %
BUN BLDV-MCNC: 47 MG/DL (ref 7–20)
CALCIUM SERPL-MCNC: 9 MG/DL (ref 8.3–10.6)
CHLORIDE BLD-SCNC: 92 MMOL/L (ref 99–110)
CO2: 23 MMOL/L (ref 21–32)
CREAT SERPL-MCNC: 4.1 MG/DL (ref 0.9–1.3)
EOSINOPHILS ABSOLUTE: 0.4 K/UL (ref 0–0.6)
EOSINOPHILS RELATIVE PERCENT: 3.9 %
GFR AFRICAN AMERICAN: 19
GFR NON-AFRICAN AMERICAN: 15
GLUCOSE BLD-MCNC: 181 MG/DL (ref 70–99)
GLUCOSE BLD-MCNC: 196 MG/DL (ref 70–99)
GLUCOSE BLD-MCNC: 203 MG/DL (ref 70–99)
GLUCOSE BLD-MCNC: 270 MG/DL (ref 70–99)
GLUCOSE BLD-MCNC: 339 MG/DL (ref 70–99)
HCT VFR BLD CALC: 32.2 % (ref 40.5–52.5)
HEMOGLOBIN: 10.5 G/DL (ref 13.5–17.5)
LYMPHOCYTES ABSOLUTE: 0.9 K/UL (ref 1–5.1)
LYMPHOCYTES RELATIVE PERCENT: 10 %
MCH RBC QN AUTO: 30.8 PG (ref 26–34)
MCHC RBC AUTO-ENTMCNC: 32.6 G/DL (ref 31–36)
MCV RBC AUTO: 94.7 FL (ref 80–100)
MONOCYTES ABSOLUTE: 0.7 K/UL (ref 0–1.3)
MONOCYTES RELATIVE PERCENT: 7.2 %
NEUTROPHILS ABSOLUTE: 7.1 K/UL (ref 1.7–7.7)
NEUTROPHILS RELATIVE PERCENT: 78.2 %
PDW BLD-RTO: 16.9 % (ref 12.4–15.4)
PERFORMED ON: ABNORMAL
PLATELET # BLD: 259 K/UL (ref 135–450)
PMV BLD AUTO: 8.4 FL (ref 5–10.5)
POTASSIUM SERPL-SCNC: 5 MMOL/L (ref 3.5–5.1)
RBC # BLD: 3.4 M/UL (ref 4.2–5.9)
SODIUM BLD-SCNC: 128 MMOL/L (ref 136–145)
WBC # BLD: 9.1 K/UL (ref 4–11)

## 2020-06-06 PROCEDURE — 6370000000 HC RX 637 (ALT 250 FOR IP): Performed by: NURSE PRACTITIONER

## 2020-06-06 PROCEDURE — 94640 AIRWAY INHALATION TREATMENT: CPT

## 2020-06-06 PROCEDURE — 6360000002 HC RX W HCPCS: Performed by: INTERNAL MEDICINE

## 2020-06-06 PROCEDURE — 80048 BASIC METABOLIC PNL TOTAL CA: CPT

## 2020-06-06 PROCEDURE — 6370000000 HC RX 637 (ALT 250 FOR IP): Performed by: PEDIATRICS

## 2020-06-06 PROCEDURE — 6360000002 HC RX W HCPCS: Performed by: NURSE PRACTITIONER

## 2020-06-06 PROCEDURE — 94660 CPAP INITIATION&MGMT: CPT

## 2020-06-06 PROCEDURE — 73700 CT LOWER EXTREMITY W/O DYE: CPT

## 2020-06-06 PROCEDURE — 93005 ELECTROCARDIOGRAM TRACING: CPT | Performed by: INTERNAL MEDICINE

## 2020-06-06 PROCEDURE — 6370000000 HC RX 637 (ALT 250 FOR IP): Performed by: INTERNAL MEDICINE

## 2020-06-06 PROCEDURE — 99232 SBSQ HOSP IP/OBS MODERATE 35: CPT | Performed by: NURSE PRACTITIONER

## 2020-06-06 PROCEDURE — 1200000000 HC SEMI PRIVATE

## 2020-06-06 PROCEDURE — 90935 HEMODIALYSIS ONE EVALUATION: CPT

## 2020-06-06 PROCEDURE — 85025 COMPLETE CBC W/AUTO DIFF WBC: CPT

## 2020-06-06 PROCEDURE — 2580000003 HC RX 258: Performed by: NURSE PRACTITIONER

## 2020-06-06 RX ORDER — HYDROMORPHONE HCL 110MG/55ML
0.5 PATIENT CONTROLLED ANALGESIA SYRINGE INTRAVENOUS EVERY 4 HOURS PRN
Status: DISCONTINUED | OUTPATIENT
Start: 2020-06-06 | End: 2020-06-10 | Stop reason: HOSPADM

## 2020-06-06 RX ADMIN — PANTOPRAZOLE SODIUM 40 MG: 40 TABLET, DELAYED RELEASE ORAL at 07:38

## 2020-06-06 RX ADMIN — PRAVASTATIN SODIUM 80 MG: 80 TABLET ORAL at 12:35

## 2020-06-06 RX ADMIN — GLYCOPYRROLATE AND FORMOTEROL FUMARATE 2 PUFF: 9; 4.8 AEROSOL, METERED RESPIRATORY (INHALATION) at 08:04

## 2020-06-06 RX ADMIN — INSULIN LISPRO 2 UNITS: 100 INJECTION, SOLUTION INTRAVENOUS; SUBCUTANEOUS at 20:26

## 2020-06-06 RX ADMIN — HYDRALAZINE HYDROCHLORIDE 25 MG: 25 TABLET, FILM COATED ORAL at 20:24

## 2020-06-06 RX ADMIN — HEPARIN SODIUM 5000 UNITS: 5000 INJECTION INTRAVENOUS; SUBCUTANEOUS at 05:31

## 2020-06-06 RX ADMIN — ALBUTEROL SULFATE 2.5 MG: 2.5 SOLUTION RESPIRATORY (INHALATION) at 08:03

## 2020-06-06 RX ADMIN — DULOXETINE HYDROCHLORIDE 30 MG: 30 CAPSULE, DELAYED RELEASE ORAL at 12:35

## 2020-06-06 RX ADMIN — CALCIUM ACETATE 667 MG: 667 CAPSULE ORAL at 16:27

## 2020-06-06 RX ADMIN — METOPROLOL SUCCINATE 25 MG: 50 TABLET, EXTENDED RELEASE ORAL at 12:35

## 2020-06-06 RX ADMIN — CLOPIDOGREL BISULFATE 75 MG: 75 TABLET ORAL at 12:35

## 2020-06-06 RX ADMIN — Medication 10 ML: at 12:35

## 2020-06-06 RX ADMIN — GABAPENTIN 100 MG: 100 CAPSULE ORAL at 20:24

## 2020-06-06 RX ADMIN — NYSTATIN: 100000 CREAM TOPICAL at 20:23

## 2020-06-06 RX ADMIN — ALBUTEROL SULFATE 2.5 MG: 2.5 SOLUTION RESPIRATORY (INHALATION) at 15:51

## 2020-06-06 RX ADMIN — QUETIAPINE FUMARATE 50 MG: 25 TABLET ORAL at 16:28

## 2020-06-06 RX ADMIN — TRAZODONE HYDROCHLORIDE 150 MG: 50 TABLET ORAL at 20:24

## 2020-06-06 RX ADMIN — OXYCODONE HYDROCHLORIDE AND ACETAMINOPHEN 1 TABLET: 7.5; 325 TABLET ORAL at 07:39

## 2020-06-06 RX ADMIN — HYDRALAZINE HYDROCHLORIDE 25 MG: 25 TABLET, FILM COATED ORAL at 13:12

## 2020-06-06 RX ADMIN — ALBUTEROL SULFATE 2.5 MG: 2.5 SOLUTION RESPIRATORY (INHALATION) at 19:42

## 2020-06-06 RX ADMIN — HEPARIN SODIUM 5000 UNITS: 5000 INJECTION INTRAVENOUS; SUBCUTANEOUS at 21:32

## 2020-06-06 RX ADMIN — ASPIRIN 81 MG 81 MG: 81 TABLET ORAL at 12:34

## 2020-06-06 RX ADMIN — INSULIN LISPRO 12 UNITS: 100 INJECTION, SOLUTION INTRAVENOUS; SUBCUTANEOUS at 16:28

## 2020-06-06 RX ADMIN — INSULIN GLARGINE 30 UNITS: 100 INJECTION, SOLUTION SUBCUTANEOUS at 20:26

## 2020-06-06 RX ADMIN — ONDANSETRON 4 MG: 2 INJECTION INTRAMUSCULAR; INTRAVENOUS at 13:18

## 2020-06-06 RX ADMIN — GLYCOPYRROLATE AND FORMOTEROL FUMARATE 2 PUFF: 9; 4.8 AEROSOL, METERED RESPIRATORY (INHALATION) at 19:42

## 2020-06-06 RX ADMIN — HEPARIN SODIUM 4000 UNITS: 1000 INJECTION INTRAVENOUS; SUBCUTANEOUS at 12:26

## 2020-06-06 RX ADMIN — NYSTATIN: 100000 CREAM TOPICAL at 13:12

## 2020-06-06 RX ADMIN — LOSARTAN POTASSIUM 50 MG: 100 TABLET, FILM COATED ORAL at 12:34

## 2020-06-06 RX ADMIN — OXYCODONE HYDROCHLORIDE AND ACETAMINOPHEN 1 TABLET: 7.5; 325 TABLET ORAL at 12:35

## 2020-06-06 RX ADMIN — HEPARIN SODIUM 5000 UNITS: 5000 INJECTION INTRAVENOUS; SUBCUTANEOUS at 13:12

## 2020-06-06 RX ADMIN — HYDROMORPHONE HYDROCHLORIDE 0.5 MG: 2 INJECTION, SOLUTION INTRAMUSCULAR; INTRAVENOUS; SUBCUTANEOUS at 17:30

## 2020-06-06 RX ADMIN — Medication 10 ML: at 21:32

## 2020-06-06 RX ADMIN — HYDROMORPHONE HYDROCHLORIDE 0.5 MG: 2 INJECTION, SOLUTION INTRAMUSCULAR; INTRAVENOUS; SUBCUTANEOUS at 21:32

## 2020-06-06 RX ADMIN — OXYCODONE HYDROCHLORIDE AND ACETAMINOPHEN 1 TABLET: 7.5; 325 TABLET ORAL at 16:36

## 2020-06-06 RX ADMIN — HYDRALAZINE HYDROCHLORIDE 25 MG: 25 TABLET, FILM COATED ORAL at 07:38

## 2020-06-06 RX ADMIN — PANTOPRAZOLE SODIUM 40 MG: 40 TABLET, DELAYED RELEASE ORAL at 15:18

## 2020-06-06 RX ADMIN — INSULIN LISPRO 9 UNITS: 100 INJECTION, SOLUTION INTRAVENOUS; SUBCUTANEOUS at 13:12

## 2020-06-06 RX ADMIN — ALBUTEROL SULFATE 2.5 MG: 2.5 SOLUTION RESPIRATORY (INHALATION) at 11:47

## 2020-06-06 RX ADMIN — HYDROMORPHONE HYDROCHLORIDE 0.5 MG: 2 INJECTION, SOLUTION INTRAMUSCULAR; INTRAVENOUS; SUBCUTANEOUS at 13:27

## 2020-06-06 RX ADMIN — CALCIUM ACETATE 667 MG: 667 CAPSULE ORAL at 12:34

## 2020-06-06 RX ADMIN — OXYCODONE HYDROCHLORIDE AND ACETAMINOPHEN 1 TABLET: 7.5; 325 TABLET ORAL at 20:32

## 2020-06-06 ASSESSMENT — ENCOUNTER SYMPTOMS
CHEST TIGHTNESS: 0
WHEEZING: 0

## 2020-06-06 ASSESSMENT — PAIN DESCRIPTION - ONSET: ONSET: ON-GOING

## 2020-06-06 ASSESSMENT — PAIN DESCRIPTION - FREQUENCY: FREQUENCY: INTERMITTENT

## 2020-06-06 ASSESSMENT — PAIN DESCRIPTION - PROGRESSION: CLINICAL_PROGRESSION: GRADUALLY WORSENING

## 2020-06-06 ASSESSMENT — PAIN SCALES - GENERAL
PAINLEVEL_OUTOF10: 8
PAINLEVEL_OUTOF10: 10
PAINLEVEL_OUTOF10: 8
PAINLEVEL_OUTOF10: 10
PAINLEVEL_OUTOF10: 10
PAINLEVEL_OUTOF10: 4
PAINLEVEL_OUTOF10: 10

## 2020-06-06 ASSESSMENT — PAIN DESCRIPTION - PAIN TYPE
TYPE: CHRONIC PAIN
TYPE: CHRONIC PAIN

## 2020-06-06 ASSESSMENT — PAIN DESCRIPTION - LOCATION
LOCATION: BACK;HIP
LOCATION: BACK;HIP

## 2020-06-06 NOTE — PROGRESS NOTES
hemodialysis      Diabetes mellitus type 2 with hyperglycemia  Noncompliant with diet  Continue basal bolus insulin as well as sliding scale  Proceed with carb controlled diet  Blood sugar fluctuates. Diabetic neuropathy  Continue gabapentin     Morbid obesity  Body mass index is 46.51 kg/m². Complicating assessment and treatment. Placing patient at risk for multiple co-morbidities as well as early death and contributing to the patient's presentation. Counseled on weight loss     Essential hypertension  Continue metoprolol, losartan and hydralazine    Coronary artery disease  Continue aspirin and Plavix    Chronic normocytic anemia secondary to chronic kidney disease  Monitor hemoglobin. Currently no need for transfusion    Leukocytosis on admission   White blood cell count normal today  Most likely reactive, though there is a suspicion of a focus of the right thigh.   Further work-up as above    Discussed with nursing  Discussed with the patient, questions answered      DVT Prophylaxis: Subcutaneous heparin  Diet: DIET CARB CONTROL;  Code Status: Full Code    PT/OT Eval Status: Requested and in process    Dispo -probably will stay over the weekend, pending further work-up and fluid removal with dialysis    Brain MD Alyson

## 2020-06-06 NOTE — PROGRESS NOTES
06/06/20 0336   NIV Type   $NIV $Daily Charge   NIV Started/Stopped On   Equipment Type v60   Mode CPAP   Mask Type Full face mask   Mask Size Medium   Settings/Measurements   CPAP/EPAP 10 cmH2O   FiO2  21 %   Vt Exhaled 544 mL   Mask Leak (lpm) 39 lpm   Comfort Level Good   Using Accessory Muscles No   Breath Sounds   Right Upper Lobe Diminished   Right Middle Lobe Diminished   Right Lower Lobe Diminished   Left Upper Lobe Diminished   Left Lower Lobe Diminished   Alarm Settings   Alarms On Y   Press Low Alarm 6 cmH2O   High Pressure Alarm 30 cmH2O   Resp Rate Low Alarm 6   High Respiratory Rate 40 br/min

## 2020-06-06 NOTE — PROGRESS NOTES
06/05/20 5402   NIV Type   NIV Started/Stopped On   Equipment Type v60   Mode CPAP   Mask Type Full face mask   Mask Size Medium   Settings/Measurements   CPAP/EPAP 10 cmH2O   Resp 14   FiO2  21 %   Vt Exhaled 645 mL   Minute Volume 8.4 Liters   Mask Leak (lpm) 46 lpm   Comfort Level Good   Using Accessory Muscles No   Breath Sounds   Right Upper Lobe Diminished   Right Middle Lobe Diminished   Right Lower Lobe Diminished   Left Upper Lobe Diminished   Left Lower Lobe Diminished   Alarm Settings   Alarms On Y   Press Low Alarm 6 cmH2O   High Pressure Alarm 30 cmH2O   Resp Rate Low Alarm 6   High Respiratory Rate 40 br/min

## 2020-06-06 NOTE — PROGRESS NOTES
Progress Note    HISTORY     CC:  Fall/Weakness           We are following for ESRD       Subjective/   HPI:  Seen on dialysis. Tolerating 2 kg UF. BP initially did drop but no cramps. No new complaints. The catheter is working well.       ROS:  Constitutional:  No fevers, No Chills, + weakness  Cardiovascular:  No palpations, + edema  Respiratory:  No wheezing, no cough  Skin:  No rash, no itching  :  No hematuria, not making much urine     Social Hx:  No family history     Past Medical and Surgical History:  - Reviewed, no changes     EXAM       Objective/     Vitals:    06/05/20 2310 06/05/20 2312 06/06/20 0536 06/06/20 0833   BP: (!) 163/83  (!) 161/83    Pulse: 84  88    Resp: 16 14 19 18   Temp: 98.1 °F (36.7 °C)  98 °F (36.7 °C)    TempSrc:       SpO2: 95%  97% 97%   Weight:       Height:         24HR INTAKE/OUTPUT:      Intake/Output Summary (Last 24 hours) at 6/6/2020 1052  Last data filed at 6/6/2020 0533  Gross per 24 hour   Intake --   Output 1000 ml   Net -1000 ml     Constitutional:  Alert, awake, no apparent distress  Eyes:  Pupils reactive, sclera clear   Neck:  Normal thyroid, no masses   Cardiovascular:  Regular, no rub  Respiratory:  No distress, no wheezing  Psychiatry:  Appropriate mood/affect, alert  Abdomen: +bs, soft, nt, no masses   Musculoskeletal: + LE edema, no clubbing   Lymphatics:  No LAD in neck, no supraclavicular nodes   Access:  Left [facility]       MEDICAL DECISION MAKING       Data/  Recent Labs     06/04/20 1947 06/05/20 0440 06/06/20  0521   WBC 12.4* 10.7 9.1   HGB 10.2* 10.2* 10.5*   HCT 31.4* 30.0* 32.2*   MCV 94.4 92.3 94.7    280 259     Recent Labs     06/04/20 2033 06/05/20  0008 06/05/20 0440 06/06/20  0521   *  --   --  133* 128*   K 7.1*  --   --  3.7 5.0   CL 85*  --   --  94* 92*   CO2 24  --   --  29 23   GLUCOSE 476*   < > 383 196* 181*   BUN 89*  --   --  42* 47*   CREATININE 6.3*  --   --  3.2* 4.1*   LABGLOM 9*  --   --  20* 15* GFRAA 11*  --   --  25* 19*    < > = values in this interval not displayed. Assessment/     ESRD:  Schedule MWF, has working [facility]. Fistula ligated due to vascular access associated steal syndrome. Failed PD for volume issues due to elevated glucose.       Fall:  Progressive weakness without clear anatomic etiology. Left rehab AMA. On low dose gabapentin. Certainly, could be elevated potassium. Anemia:  At target of hb 10 to 11     Diabetes:  Denied on kidney transplant list due to poorly controlled diabetes    Plan/     HD today, UF to target.   Try for 2-3 kg as tolerated  Follow labs  Next HD Monday  PT/OT   -----------------------------  Fabi Rahman M.D.   [facility]

## 2020-06-06 NOTE — PROGRESS NOTES
Corinn Bale, APRN - CNP        glucagon (rDNA) injection 1 mg  1 mg Intramuscular PRN Ara Hammans, APRN - CNP        dextrose 5 % solution  100 mL/hr Intravenous PRN Ara Hammans, APRN - CNP        aspirin chewable tablet 81 mg  81 mg Oral Daily Ara Hammans, APRN - CNP   81 mg at 06/06/20 1234    calcium acetate (PHOSLO) capsule 667 mg  667 mg Oral TID WC Ara Hammans, APRN - CNP   667 mg at 06/06/20 1627    clopidogrel (PLAVIX) tablet 75 mg  75 mg Oral Daily Ara Hammans, APRN - CNP   75 mg at 06/06/20 1235    DULoxetine (CYMBALTA) extended release capsule 30 mg  30 mg Oral Daily Ara Hammans, APRN - CNP   30 mg at 06/06/20 1235    hydrALAZINE (APRESOLINE) tablet 25 mg  25 mg Oral 3 times per day Ara Hammans, APRN - CNP   25 mg at 06/06/20 1312    losartan (COZAAR) tablet 50 mg  50 mg Oral Daily Ara Hammans, APRN - CNP   50 mg at 06/06/20 1234    metoprolol succinate (TOPROL XL) extended release tablet 25 mg  25 mg Oral Daily Ara Hammans, APRN - CNP   25 mg at 06/06/20 1235    nystatin (MYCOSTATIN) cream   Topical BID Ara Hammans, APRN - CNP        pantoprazole (PROTONIX) tablet 40 mg  40 mg Oral BID AC Ara Hammans, APRN - CNP   40 mg at 06/06/20 1518    QUEtiapine (SEROQUEL) tablet 50 mg  50 mg Oral QPM Ara Hammans, APRN - CNP   50 mg at 06/06/20 1628    glycopyrrolate-formoterol (BEVESPI) 9-4.8 MCG/ACT inhaler 2 puff  2 puff Inhalation BID Ara Hammans, APRN - CNP   2 puff at 06/06/20 0804    traZODone (DESYREL) tablet 150 mg  150 mg Oral Nightly Ara Hammans, APRN - CNP   150 mg at 06/05/20 2010    sodium chloride flush 0.9 % injection 10 mL  10 mL Intravenous 2 times per day Ara Hammans, APRN - CNP   10 mL at 06/06/20 1235    sodium chloride flush 0.9 % injection 10 mL  10 mL Intravenous PRN Ara Hammans, APRN - CNP        acetaminophen (TYLENOL) tablet 650 mg  650 mg Oral Q6H PRN Ara Hammans, APRN - CNP        Or    acetaminophen (TYLENOL) suppository 650 mg  650 mg Rectal Q6H PRN Jade Schmitt Psychiatric/Behavioral: Negative for sleep disturbance. Objective:     Telemetry monitor: 70-90's occasional PVCs a large amount of artifact on telemetry-    Physical Exam:  Constitutional:  Comfortable and alert, NAD, appears older than stated age  Eyes: PERRL, sclera nonicteric  Neck:  Supple, no masses, no thyroidmegaly, no JVD- TDC upper chest left  Skin:  Warm and dry; no rash or lesions  Heart:  Regular, normal apex, S1 and S2 normal, 2/6 CAROL no G/R  Lungs:  Normal respiratory effort; CTA, no rhonchi/rales/wheezes  Abdomen: soft, non tender, + bowel sounds  Extremities:  Trace BLE  Neuro: alert and oriented, moves legs and arms equally, normal mood and unusual affect      Data Reviewed:  Echo 12/10/2019:   Summary   Left ventricular systolic function is normal with estimated EF 55%. No   regional wall motion abnormality. There is mild left ventricular hypertrophy. Left ventricle size is normal.   Grade I diastolic dysfunction with normal filling pressure. A bioprosthetic artificial aortic valve appears well seated with a maximum   velocity of 2.27m/s and a mean gradient of 8mmHg. No evidence of aortic valve regurgitation. Normal functioning aortic valve   bioprosthesis. Echo 10/16/2019:  Beth Crockett  Left ventricle - moderate concentric LVH, normal size and function with EF of 55%  Aortic valve - well seated TAVR valve with mean gradient of 13mmHg, no regurgitation.   Tricuspid valve - trivial regurgitation with PASP of 25mmHg   Pericardium - trivial effusion   Right atrium - catheter noted.     TAVR procedure 10/15/2019:  Interventional Cardiology TF-TAVR Operative Report    Physicians Present:  Interventional Cardiology: Dr. Delmis Beth, Dr. Liset Ochoa  Cardiothoracic Surgery: Dr. Micky Garrett  NYHA: 3        STS Mortality: 6.51     Procedures:  Temporary transvenous pacemaker placement  Central line insertion  Balloon aortic valvuloplasty  Transcutaneous aortic valve replacement (29 mm Leyda S3 pressures            Lab Reviewed:     Renal Profile:  Lab Results   Component Value Date    CREATININE 4.1 06/06/2020    BUN 47 06/06/2020     06/06/2020    K 5.0 06/06/2020    K 3.7 06/05/2020    CL 92 06/06/2020    CO2 23 06/06/2020     CBC:    Lab Results   Component Value Date    WBC 9.1 06/06/2020    RBC 3.40 06/06/2020    HGB 10.5 06/06/2020    HCT 32.2 06/06/2020    MCV 94.7 06/06/2020    RDW 16.9 06/06/2020     06/06/2020     BNP:    Lab Results   Component Value Date    PROBNP 3,866 04/10/2020    PROBNP 2,654 11/06/2019    PROBNP 4,929 10/31/2019    PROBNP 10,514 10/18/2019    PROBNP 7,601 10/15/2019     Fasting Lipid Panel:    Lab Results   Component Value Date    CHOL 170 08/22/2019    HDL 49 08/22/2019    TRIG 231 08/22/2019     Cardiac Enzymes:  CK/MbTroponin  Lab Results   Component Value Date    CKTOTAL 167 06/05/2020    TROPONINI 0.09 06/05/2020     PT/ INR   Lab Results   Component Value Date    INR 0.91 04/25/2020    INR 0.89 02/03/2020    INR 0.94 10/10/2019    PROTIME 10.6 04/25/2020    PROTIME 10.3 02/03/2020    PROTIME 10.7 10/10/2019     PTT No results found for: PTT   Lab Results   Component Value Date    MG 2.10 04/26/2020      Lab Results   Component Value Date    TSH 0.90 06/05/2020       All labs and imaging reviewed today    Assessment:  1. Elevated troponin: flat- due to poor renal clearance  2. Junctional Rhythm: resolved- ? R/t hyperkalemia on admit   3. CAD: nonobstructive by cath 2019 with patent stents  4. Aortic stenosis: s/p TAVR  5. HTN: control varies  6. ESRD: on dialysis  7. Leg weakness  8. Hyperkalemia: improved with dialysis  9. Hyponatremia   10. DM  11. Obesity       Plan:   1. EKG today  2. Arterial doppler evaluation of lower extremities to confirm vessel patency  3. No other cardiac testing at this time  4. Continue to monitor telemetry   5.  Cardiology will sign off, please call with any questions or concerns       Moira Urbano

## 2020-06-07 LAB
ANION GAP SERPL CALCULATED.3IONS-SCNC: 7 MMOL/L (ref 3–16)
BUN BLDV-MCNC: 27 MG/DL (ref 7–20)
CALCIUM SERPL-MCNC: 8.5 MG/DL (ref 8.3–10.6)
CHLORIDE BLD-SCNC: 93 MMOL/L (ref 99–110)
CO2: 26 MMOL/L (ref 21–32)
CREAT SERPL-MCNC: 3.3 MG/DL (ref 0.9–1.3)
EKG ATRIAL RATE: 87 BPM
EKG DIAGNOSIS: NORMAL
EKG P AXIS: 65 DEGREES
EKG P-R INTERVAL: 190 MS
EKG Q-T INTERVAL: 400 MS
EKG QRS DURATION: 90 MS
EKG QTC CALCULATION (BAZETT): 481 MS
EKG R AXIS: -8 DEGREES
EKG T AXIS: 39 DEGREES
EKG VENTRICULAR RATE: 87 BPM
GFR AFRICAN AMERICAN: 24
GFR NON-AFRICAN AMERICAN: 20
GLUCOSE BLD-MCNC: 179 MG/DL (ref 70–99)
GLUCOSE BLD-MCNC: 181 MG/DL (ref 70–99)
GLUCOSE BLD-MCNC: 195 MG/DL (ref 70–99)
GLUCOSE BLD-MCNC: 203 MG/DL (ref 70–99)
GLUCOSE BLD-MCNC: 240 MG/DL (ref 70–99)
PERFORMED ON: ABNORMAL
POTASSIUM SERPL-SCNC: 4.7 MMOL/L (ref 3.5–5.1)
SODIUM BLD-SCNC: 126 MMOL/L (ref 136–145)

## 2020-06-07 PROCEDURE — 6370000000 HC RX 637 (ALT 250 FOR IP): Performed by: INTERNAL MEDICINE

## 2020-06-07 PROCEDURE — 1200000000 HC SEMI PRIVATE

## 2020-06-07 PROCEDURE — 6360000002 HC RX W HCPCS: Performed by: NURSE PRACTITIONER

## 2020-06-07 PROCEDURE — 6360000002 HC RX W HCPCS: Performed by: INTERNAL MEDICINE

## 2020-06-07 PROCEDURE — 2580000003 HC RX 258: Performed by: NURSE PRACTITIONER

## 2020-06-07 PROCEDURE — 6370000000 HC RX 637 (ALT 250 FOR IP): Performed by: PEDIATRICS

## 2020-06-07 PROCEDURE — 94640 AIRWAY INHALATION TREATMENT: CPT

## 2020-06-07 PROCEDURE — 6370000000 HC RX 637 (ALT 250 FOR IP): Performed by: NURSE PRACTITIONER

## 2020-06-07 PROCEDURE — 80048 BASIC METABOLIC PNL TOTAL CA: CPT

## 2020-06-07 PROCEDURE — 94761 N-INVAS EAR/PLS OXIMETRY MLT: CPT

## 2020-06-07 PROCEDURE — 36415 COLL VENOUS BLD VENIPUNCTURE: CPT

## 2020-06-07 PROCEDURE — 93010 ELECTROCARDIOGRAM REPORT: CPT | Performed by: INTERNAL MEDICINE

## 2020-06-07 RX ORDER — HYDROMORPHONE HCL 110MG/55ML
2 PATIENT CONTROLLED ANALGESIA SYRINGE INTRAVENOUS ONCE
Status: COMPLETED | OUTPATIENT
Start: 2020-06-07 | End: 2020-06-07

## 2020-06-07 RX ADMIN — ALBUTEROL SULFATE 2.5 MG: 2.5 SOLUTION RESPIRATORY (INHALATION) at 08:22

## 2020-06-07 RX ADMIN — INSULIN GLARGINE 30 UNITS: 100 INJECTION, SOLUTION SUBCUTANEOUS at 21:22

## 2020-06-07 RX ADMIN — HYDRALAZINE HYDROCHLORIDE 25 MG: 25 TABLET, FILM COATED ORAL at 05:31

## 2020-06-07 RX ADMIN — INSULIN LISPRO 3 UNITS: 100 INJECTION, SOLUTION INTRAVENOUS; SUBCUTANEOUS at 08:14

## 2020-06-07 RX ADMIN — CALCIUM ACETATE 667 MG: 667 CAPSULE ORAL at 11:43

## 2020-06-07 RX ADMIN — HEPARIN SODIUM 5000 UNITS: 5000 INJECTION INTRAVENOUS; SUBCUTANEOUS at 14:12

## 2020-06-07 RX ADMIN — ALBUTEROL SULFATE 2.5 MG: 2.5 SOLUTION RESPIRATORY (INHALATION) at 15:37

## 2020-06-07 RX ADMIN — GLYCOPYRROLATE AND FORMOTEROL FUMARATE 2 PUFF: 9; 4.8 AEROSOL, METERED RESPIRATORY (INHALATION) at 08:22

## 2020-06-07 RX ADMIN — PRAVASTATIN SODIUM 80 MG: 80 TABLET ORAL at 08:16

## 2020-06-07 RX ADMIN — OXYCODONE HYDROCHLORIDE AND ACETAMINOPHEN 1 TABLET: 7.5; 325 TABLET ORAL at 18:13

## 2020-06-07 RX ADMIN — HEPARIN SODIUM 5000 UNITS: 5000 INJECTION INTRAVENOUS; SUBCUTANEOUS at 21:22

## 2020-06-07 RX ADMIN — DULOXETINE HYDROCHLORIDE 30 MG: 30 CAPSULE, DELAYED RELEASE ORAL at 08:16

## 2020-06-07 RX ADMIN — CLOPIDOGREL BISULFATE 75 MG: 75 TABLET ORAL at 08:17

## 2020-06-07 RX ADMIN — INSULIN LISPRO 3 UNITS: 100 INJECTION, SOLUTION INTRAVENOUS; SUBCUTANEOUS at 21:23

## 2020-06-07 RX ADMIN — PANTOPRAZOLE SODIUM 40 MG: 40 TABLET, DELAYED RELEASE ORAL at 05:30

## 2020-06-07 RX ADMIN — HYDROMORPHONE HYDROCHLORIDE 2 MG: 2 INJECTION, SOLUTION INTRAMUSCULAR; INTRAVENOUS; SUBCUTANEOUS at 21:23

## 2020-06-07 RX ADMIN — HYDROMORPHONE HYDROCHLORIDE 0.5 MG: 2 INJECTION, SOLUTION INTRAMUSCULAR; INTRAVENOUS; SUBCUTANEOUS at 17:00

## 2020-06-07 RX ADMIN — PANTOPRAZOLE SODIUM 40 MG: 40 TABLET, DELAYED RELEASE ORAL at 14:12

## 2020-06-07 RX ADMIN — GABAPENTIN 100 MG: 100 CAPSULE ORAL at 21:22

## 2020-06-07 RX ADMIN — OXYCODONE HYDROCHLORIDE AND ACETAMINOPHEN 1 TABLET: 7.5; 325 TABLET ORAL at 05:31

## 2020-06-07 RX ADMIN — TRAZODONE HYDROCHLORIDE 150 MG: 50 TABLET ORAL at 21:23

## 2020-06-07 RX ADMIN — HYDROMORPHONE HYDROCHLORIDE 0.5 MG: 2 INJECTION, SOLUTION INTRAMUSCULAR; INTRAVENOUS; SUBCUTANEOUS at 12:54

## 2020-06-07 RX ADMIN — HYDROMORPHONE HYDROCHLORIDE 0.5 MG: 2 INJECTION, SOLUTION INTRAMUSCULAR; INTRAVENOUS; SUBCUTANEOUS at 08:17

## 2020-06-07 RX ADMIN — HYDRALAZINE HYDROCHLORIDE 25 MG: 25 TABLET, FILM COATED ORAL at 14:37

## 2020-06-07 RX ADMIN — NYSTATIN: 100000 CREAM TOPICAL at 08:17

## 2020-06-07 RX ADMIN — HEPARIN SODIUM 5000 UNITS: 5000 INJECTION INTRAVENOUS; SUBCUTANEOUS at 05:31

## 2020-06-07 RX ADMIN — LOSARTAN POTASSIUM 50 MG: 100 TABLET, FILM COATED ORAL at 08:16

## 2020-06-07 RX ADMIN — Medication 10 ML: at 21:22

## 2020-06-07 RX ADMIN — QUETIAPINE FUMARATE 50 MG: 25 TABLET ORAL at 16:59

## 2020-06-07 RX ADMIN — INSULIN LISPRO 6 UNITS: 100 INJECTION, SOLUTION INTRAVENOUS; SUBCUTANEOUS at 11:43

## 2020-06-07 RX ADMIN — OXYCODONE HYDROCHLORIDE AND ACETAMINOPHEN 1 TABLET: 7.5; 325 TABLET ORAL at 14:12

## 2020-06-07 RX ADMIN — Medication 10 ML: at 08:18

## 2020-06-07 RX ADMIN — HYDROMORPHONE HYDROCHLORIDE 0.5 MG: 2 INJECTION, SOLUTION INTRAMUSCULAR; INTRAVENOUS; SUBCUTANEOUS at 02:19

## 2020-06-07 RX ADMIN — CALCIUM ACETATE 667 MG: 667 CAPSULE ORAL at 08:16

## 2020-06-07 RX ADMIN — HYDRALAZINE HYDROCHLORIDE 25 MG: 25 TABLET, FILM COATED ORAL at 21:22

## 2020-06-07 RX ADMIN — ALBUTEROL SULFATE 2.5 MG: 2.5 SOLUTION RESPIRATORY (INHALATION) at 19:26

## 2020-06-07 RX ADMIN — GLYCOPYRROLATE AND FORMOTEROL FUMARATE 2 PUFF: 9; 4.8 AEROSOL, METERED RESPIRATORY (INHALATION) at 19:32

## 2020-06-07 RX ADMIN — OXYCODONE HYDROCHLORIDE AND ACETAMINOPHEN 1 TABLET: 7.5; 325 TABLET ORAL at 10:03

## 2020-06-07 RX ADMIN — CALCIUM ACETATE 667 MG: 667 CAPSULE ORAL at 17:00

## 2020-06-07 RX ADMIN — ASPIRIN 81 MG 81 MG: 81 TABLET ORAL at 08:16

## 2020-06-07 RX ADMIN — OXYCODONE HYDROCHLORIDE AND ACETAMINOPHEN 1 TABLET: 7.5; 325 TABLET ORAL at 00:26

## 2020-06-07 RX ADMIN — INSULIN LISPRO 3 UNITS: 100 INJECTION, SOLUTION INTRAVENOUS; SUBCUTANEOUS at 17:00

## 2020-06-07 RX ADMIN — METOPROLOL SUCCINATE 25 MG: 50 TABLET, EXTENDED RELEASE ORAL at 08:16

## 2020-06-07 ASSESSMENT — PAIN DESCRIPTION - LOCATION: LOCATION: HIP;BACK;LEG

## 2020-06-07 ASSESSMENT — PAIN SCALES - GENERAL
PAINLEVEL_OUTOF10: 9
PAINLEVEL_OUTOF10: 10
PAINLEVEL_OUTOF10: 8
PAINLEVEL_OUTOF10: 10
PAINLEVEL_OUTOF10: 9
PAINLEVEL_OUTOF10: 10

## 2020-06-07 ASSESSMENT — PAIN DESCRIPTION - DESCRIPTORS: DESCRIPTORS: ACHING

## 2020-06-07 ASSESSMENT — PAIN DESCRIPTION - PAIN TYPE: TYPE: CHRONIC PAIN;ACUTE PAIN

## 2020-06-07 ASSESSMENT — PAIN DESCRIPTION - FREQUENCY: FREQUENCY: CONTINUOUS

## 2020-06-07 ASSESSMENT — PAIN DESCRIPTION - ONSET: ONSET: ON-GOING

## 2020-06-07 NOTE — CONSULTS
tablets by mouth every 8 hours 3/10/20   JAY Roach CNP   nystatin (MYCOSTATIN) 472471 UNIT/GM cream Apply topically 2 times daily. 3/4/20   Itzel Alvarez MD   insulin lispro (HUMALOG) 100 UNIT/ML injection vial Inject 10 Units into the skin 3 times daily (before meals)  Patient not taking: Reported on 5/26/2020 3/4/20   Itzel Alvarez MD   Insulin Syringe-Needle U-100 30G X 1/2\" 0.5 ML MISC 1 each by Does not apply route daily 3/4/20   Itzel Alvarez MD   insulin aspart (NOVOLOG FLEXPEN) 100 UNIT/ML injection pen Inject 10 Units into the skin 3 times daily (before meals) 3/4/20   Itzel Alvarez MD   nitroGLYCERIN (NITROSTAT) 0.4 MG SL tablet DISSOLVE 1 TABLET UNDER THE TONGUE AS NEEDED FOR CHEST PAIN EVERY 5 MINUTES UP TO 3 TIMES. IF NO RELIEF CALL 911. 2/7/20   Itzel Alvarez MD   sennosides-docusate sodium (SENOKOT-S) 8.6-50 MG tablet Take 1 tablet by mouth daily 2/4/20   Marcie Sepulveda MD   pravastatin (PRAVACHOL) 80 MG tablet TAKE 1 TABLET BY MOUTH ONCE DAILY 1/10/20   Tashia Adan MD   clopidogrel (PLAVIX) 75 MG tablet TAKE 1 TABLET BY MOUTH ONCE DAILY 1/10/20   Tashia Adan MD   hydrOXYzine (VISTARIL) 50 MG capsule TAKE 1 TO 2 CAPSULES BY MOUTH NIGHTLY 12/4/19   Itzel Alvarez MD   ondansetron (ZOFRAN ODT) 4 MG disintegrating tablet Take 1 tablet by mouth every 8 hours as needed for Nausea 11/6/19   JASE Tirado   calcium acetate (PHOSLO) 667 MG capsule Take 1 capsule by mouth 3 times daily (with meals) 10/23/19   JAY Pate CNP   traZODone (DESYREL) 150 MG tablet TAKE (1) TABLET BY MOUTH NIGHTLY 10/9/19   Itzel Alvarez MD   ACCU-CHEK FASTCLIX LANCETS MISC TEST 3-4 TIMES DAILY, AS DIRECTED 8/19/19   Itzel Alvarez MD   blood glucose test strips (FREESTYLE LITE) strip Daily As needed.  8/19/19   Itzel Alvarez MD   glucose monitoring kit (FREESTYLE) monitoring kit 1 kit by Does not apply route daily 8/19/19   Itzel Alvarez MD   vitamin D (ERGOCALCIFEROL) 14188 units

## 2020-06-07 NOTE — PROGRESS NOTES
06/06/20 2205   NIV Type   NIV Started/Stopped On   Equipment Type v60   Mode CPAP   Mask Type Full face mask   Mask Size Medium   Settings/Measurements   CPAP/EPAP 10 cmH2O   FiO2  21 %   Vt Exhaled 674 mL   Minute Volume 908 Liters   Mask Leak (lpm) 42 lpm   Comfort Level Good   Using Accessory Muscles No   Breath Sounds   Right Upper Lobe Diminished   Right Middle Lobe Diminished   Right Lower Lobe Diminished   Left Upper Lobe Diminished   Left Lower Lobe Diminished   Alarm Settings   Alarms On Y   Press Low Alarm 6 cmH2O   High Pressure Alarm 30 cmH2O   Resp Rate Low Alarm 6   High Respiratory Rate 40 br/min

## 2020-06-07 NOTE — PROGRESS NOTES
Hospitalist Progress Note      PCP: Samara Remy MD    Date of Admission: 6/4/2020    Chief Complaint: Weakness, fall    Hospital Course: Admitted with several falls caused by weakness. Unclear reason at this time. There is no acute infection. No fractures noted. Patient is noncompliant with diet and medical recommendations. Hemodialysis patient. Admitted with critically elevated potassium. Improved with medical management,.had hemodialysis. Complained of right thigh pain. Noted CT scan of the right femur resulted showing a large posttraumatic hematoma. Subjective: Weakness. No chest pain, no shortness of breath, no nausea, no vomiting. Right posterior thigh pain remains severe. Responds to pain medications.       Medications:  Reviewed    Infusion Medications    dextrose       Scheduled Medications    insulin lispro  0-18 Units Subcutaneous TID WC    insulin lispro  0-9 Units Subcutaneous Nightly    aspirin  81 mg Oral Daily    calcium acetate  667 mg Oral TID WC    clopidogrel  75 mg Oral Daily    DULoxetine  30 mg Oral Daily    hydrALAZINE  25 mg Oral 3 times per day    losartan  50 mg Oral Daily    metoprolol succinate  25 mg Oral Daily    nystatin   Topical BID    pantoprazole  40 mg Oral BID AC    QUEtiapine  50 mg Oral QPM    glycopyrrolate-formoterol  2 puff Inhalation BID    traZODone  150 mg Oral Nightly    sodium chloride flush  10 mL Intravenous 2 times per day    heparin (porcine)  5,000 Units Subcutaneous 3 times per day    albuterol  2.5 mg Nebulization Q4H WA    insulin glargine  30 Units Subcutaneous Nightly    pravastatin  80 mg Oral Daily    gabapentin  100 mg Oral Nightly    heparin (porcine)  2,000 Units Intravenous Once    heparin (porcine)  500 Units Intravenous Once     PRN Meds: HYDROmorphone, glucose, dextrose, glucagon (rDNA), dextrose, sodium chloride flush, acetaminophen **OR** acetaminophen, polyethylene glycol, promethazine **OR** ondansetron, calcium carbonate, oxyCODONE-acetaminophen, heparin (porcine), midodrine      Intake/Output Summary (Last 24 hours) at 6/7/2020 1012  Last data filed at 6/7/2020 0743  Gross per 24 hour   Intake 1600 ml   Output 3100 ml   Net -1500 ml       Physical Exam Performed:    /79   Pulse 88   Temp 98.3 °F (36.8 °C) (Oral)   Resp 16   Ht 5' 9\" (1.753 m)   Wt 272 lb 14.9 oz (123.8 kg)   SpO2 97%   BMI 40.30 kg/m²     General appearance: No apparent distress, appears stated age and cooperative. HEENT: Pupils equal, round, and reactive to light. Conjunctivae/corneas clear. Neck: Supple, with full range of motion. No jugular venous distention. Trachea midline. Respiratory:  Normal respiratory effort. Clear to auscultation, bilaterally without Rales/Wheezes/Rhonchi. Cardiovascular: Regular rate and rhythm with normal S1/S2 without murmurs, rubs or gallops. Abdomen: Soft, non-tender, non-distended with normal bowel sounds. Musculoskeletal: No clubbing, cyanosis or edema bilaterally. Right thigh proximal flexion side is tender and shows significant induration within the area suspicious for fluctuance. Skin: Skin color, texture, turgor normal.  No rashes or lesions. Neurologic:  Neurovascularly intact without any focal sensory/motor deficits. Cranial nerves: II-XII intact, grossly non-focal.  Psychiatric: Alert and oriented, thought content appropriate, normal insight  Capillary Refill: Brisk,< 3 seconds   Peripheral Pulses: +2 palpable, equal bilaterally     I have examined the patient today (06/07/20).   Physical exam is same as yesterday (6/6)    Labs:   Recent Labs     06/04/20  1947 06/05/20 0440 06/06/20  0521   WBC 12.4* 10.7 9.1   HGB 10.2* 10.2* 10.5*   HCT 31.4* 30.0* 32.2*    280 259     Recent Labs     06/05/20 0440 06/06/20  0521 06/07/20  0531   * 128* 126*   K 3.7 5.0 4.7   CL 94* 92* 93*   CO2 29 23 26   BUN 42* 47* 27*   CREATININE 3.2* 4.1* 3.3*   CALCIUM 8.7 9.0 8.5 Recent Labs     06/04/20 2033   AST 29   ALT 15   BILITOT <0.2   ALKPHOS 105     No results for input(s): INR in the last 72 hours. Recent Labs     06/04/20 2033 06/05/20  0924   CKTOTAL  --  167   TROPONINI 0.10* 0.09*       Urinalysis:      Lab Results   Component Value Date    NITRU Negative 06/05/2020    WBCUA 10-20 06/05/2020    BACTERIA 2+ 06/05/2020    RBCUA 0-2 06/05/2020    BLOODU TRACE-INTACT 06/05/2020    SPECGRAV 1.020 06/05/2020    GLUCOSEU 250 06/05/2020       Radiology:  CT FEMUR RIGHT WO CONTRAST   Final Result   1. Intramuscular edema and blood products involving the semimembranosus   muscle along its mid aspect consistent with intramuscular hematoma and   high-grade muscular strain. The hematoma is difficult to accurately measure   given the interdigitation of muscle fibers but spans approximately 12 cm in   cranial caudal dimension and measures approximately 3.5 x 4.7 cm in greatest   axial dimension. Surrounding subcutaneous edema. 2. No acute osseous abnormality identified. CT LUMBAR SPINE WO CONTRAST   Final Result   No acute abnormality identified. XR KNEE LEFT (1-2 VIEWS)   Final Result   No acute osseous abnormality. XR CHEST PORTABLE   Final Result   No acute abnormality.          VL DUP LOWER EXTREMITY ARTERIES BILATERAL    (Results Pending)           Assessment/Plan:    Active Hospital Problems    Diagnosis    S/p TAVR (transcatheter aortic valve replacement), bioprosthetic [Z95.3]     Priority: Medium    End stage renal disease on dialysis ([facility] [address] [N18.6, Z99.2]    Hyperkalemia [E87.5]    Morbid obesity ([address] [E66.01]    HTN (hypertension), benign [I10]    Acute on chronic combined systolic and diastolic heart failure (HCC) [I50.43]    Elevated troponin [R79.89]    Type 2 diabetes, uncontrolled, with neuropathy (HCC) [E11.40, E11.65]    CAD (coronary artery disease) [I25.10]     PLAN:    Traumatic hematoma of the right side  Probably not infected  Symptomatic  Appears large enough to cause significant dysfunction especially with ambulation. I will consult orthopedic surgery for further evaluation and recommendations. Fall in setting of generalized weakness  Does not seem to have any acute infection or cardiac condition. CK normal.  No rhabdomyolysis. However, fall seems to have caused a right thigh hematoma as explained above. PT OT consulted. Elevated troponin  Seen by cardiology. Elevated troponin determined to be secondary to poor cardiac clearance. Nothing further needed, unless new symptoms arise. .     End-stage renal disease on hemodialysis  Nephrology to oversee hemodialysis  Tolerating hemodialysis well    Hyperkalemia  Resolved with medical management. Proceed with hemodialysis      Diabetes mellitus type 2 with hyperglycemia  Noncompliant with diet  Continue basal bolus insulin as well as sliding scale  Proceed with carb controlled diet  Blood sugar fluctuates. Diabetic neuropathy  Continue gabapentin     Morbid obesity  Body mass index is 27.2 kg/m². Complicating assessment and treatment. Placing patient at risk for multiple co-morbidities as well as early death and contributing to the patient's presentation. Counseled on weight loss     Essential hypertension  Continue metoprolol, losartan and hydralazine    Coronary artery disease  Continue aspirin and Plavix for now. If these need to be stopped for treatment of hematoma, we will need to ask cardiology input. Chronic normocytic anemia secondary to chronic kidney disease  Monitor hemoglobin. Currently no need for transfusion    Leukocytosis on admission   White blood cell count down to normal.  Most likely reactive  Continue to monitor.       Discussed with the patient, questions answered      DVT Prophylaxis: Subcutaneous heparin  Diet: DIET CARB CONTROL;  Code Status: Full Code    PT/OT Eval Status: Requested and in process    Dispo -probably will stay over the weekend, pending further work-up and fluid removal with dialysis    Francisca Caballero MD

## 2020-06-08 ENCOUNTER — APPOINTMENT (OUTPATIENT)
Dept: VASCULAR LAB | Age: 50
DRG: 640 | End: 2020-06-08
Payer: COMMERCIAL

## 2020-06-08 LAB
A/G RATIO: 1.3 (ref 1.1–2.2)
ALBUMIN SERPL-MCNC: 3.6 G/DL (ref 3.4–5)
ALP BLD-CCNC: 95 U/L (ref 40–129)
ALT SERPL-CCNC: 13 U/L (ref 10–40)
ANION GAP SERPL CALCULATED.3IONS-SCNC: 12 MMOL/L (ref 3–16)
AST SERPL-CCNC: 17 U/L (ref 15–37)
BILIRUB SERPL-MCNC: <0.2 MG/DL (ref 0–1)
BLOOD CULTURE, ROUTINE: NORMAL
BUN BLDV-MCNC: 34 MG/DL (ref 7–20)
CALCIUM SERPL-MCNC: 8.6 MG/DL (ref 8.3–10.6)
CHLORIDE BLD-SCNC: 84 MMOL/L (ref 99–110)
CO2: 25 MMOL/L (ref 21–32)
CREAT SERPL-MCNC: 3.8 MG/DL (ref 0.9–1.3)
CULTURE, BLOOD 2: NORMAL
GFR AFRICAN AMERICAN: 20
GFR NON-AFRICAN AMERICAN: 17
GLOBULIN: 2.8 G/DL
GLUCOSE BLD-MCNC: 144 MG/DL (ref 70–99)
GLUCOSE BLD-MCNC: 180 MG/DL (ref 70–99)
GLUCOSE BLD-MCNC: 200 MG/DL (ref 70–99)
GLUCOSE BLD-MCNC: 218 MG/DL (ref 70–99)
GLUCOSE BLD-MCNC: 233 MG/DL (ref 70–99)
HCT VFR BLD CALC: 29.5 % (ref 40.5–52.5)
HEMOGLOBIN: 9.7 G/DL (ref 13.5–17.5)
MCH RBC QN AUTO: 31.1 PG (ref 26–34)
MCHC RBC AUTO-ENTMCNC: 33 G/DL (ref 31–36)
MCV RBC AUTO: 94.3 FL (ref 80–100)
PDW BLD-RTO: 16.6 % (ref 12.4–15.4)
PERFORMED ON: ABNORMAL
PHOSPHORUS: 4.4 MG/DL (ref 2.5–4.9)
PLATELET # BLD: 272 K/UL (ref 135–450)
PMV BLD AUTO: 8.2 FL (ref 5–10.5)
POTASSIUM SERPL-SCNC: 4.6 MMOL/L (ref 3.5–5.1)
RBC # BLD: 3.12 M/UL (ref 4.2–5.9)
SODIUM BLD-SCNC: 121 MMOL/L (ref 136–145)
TOTAL PROTEIN: 6.4 G/DL (ref 6.4–8.2)
WBC # BLD: 7.9 K/UL (ref 4–11)

## 2020-06-08 PROCEDURE — 36415 COLL VENOUS BLD VENIPUNCTURE: CPT

## 2020-06-08 PROCEDURE — 80053 COMPREHEN METABOLIC PANEL: CPT

## 2020-06-08 PROCEDURE — 94660 CPAP INITIATION&MGMT: CPT

## 2020-06-08 PROCEDURE — 6370000000 HC RX 637 (ALT 250 FOR IP): Performed by: NURSE PRACTITIONER

## 2020-06-08 PROCEDURE — 94640 AIRWAY INHALATION TREATMENT: CPT

## 2020-06-08 PROCEDURE — 6360000002 HC RX W HCPCS: Performed by: NURSE PRACTITIONER

## 2020-06-08 PROCEDURE — 97530 THERAPEUTIC ACTIVITIES: CPT

## 2020-06-08 PROCEDURE — 2580000003 HC RX 258: Performed by: NURSE PRACTITIONER

## 2020-06-08 PROCEDURE — 6360000002 HC RX W HCPCS: Performed by: INTERNAL MEDICINE

## 2020-06-08 PROCEDURE — 6370000000 HC RX 637 (ALT 250 FOR IP): Performed by: INTERNAL MEDICINE

## 2020-06-08 PROCEDURE — 93925 LOWER EXTREMITY STUDY: CPT

## 2020-06-08 PROCEDURE — 6370000000 HC RX 637 (ALT 250 FOR IP): Performed by: PEDIATRICS

## 2020-06-08 PROCEDURE — 85027 COMPLETE CBC AUTOMATED: CPT

## 2020-06-08 PROCEDURE — 84100 ASSAY OF PHOSPHORUS: CPT

## 2020-06-08 PROCEDURE — 1200000000 HC SEMI PRIVATE

## 2020-06-08 PROCEDURE — 97110 THERAPEUTIC EXERCISES: CPT

## 2020-06-08 PROCEDURE — 90935 HEMODIALYSIS ONE EVALUATION: CPT

## 2020-06-08 RX ORDER — CHOLECALCIFEROL (VITAMIN D3) 10 MCG
1 TABLET ORAL DAILY
Status: DISCONTINUED | OUTPATIENT
Start: 2020-06-08 | End: 2020-06-10 | Stop reason: HOSPADM

## 2020-06-08 RX ORDER — HEPARIN SODIUM 1000 [USP'U]/ML
INJECTION, SOLUTION INTRAVENOUS; SUBCUTANEOUS
Status: DISPENSED
Start: 2020-06-08 | End: 2020-06-09

## 2020-06-08 RX ADMIN — INSULIN LISPRO 3 UNITS: 100 INJECTION, SOLUTION INTRAVENOUS; SUBCUTANEOUS at 21:10

## 2020-06-08 RX ADMIN — NYSTATIN: 100000 CREAM TOPICAL at 21:31

## 2020-06-08 RX ADMIN — ALBUTEROL SULFATE 2.5 MG: 2.5 SOLUTION RESPIRATORY (INHALATION) at 11:38

## 2020-06-08 RX ADMIN — Medication 10 ML: at 21:10

## 2020-06-08 RX ADMIN — CALCIUM ACETATE 667 MG: 667 CAPSULE ORAL at 09:30

## 2020-06-08 RX ADMIN — PANTOPRAZOLE SODIUM 40 MG: 40 TABLET, DELAYED RELEASE ORAL at 18:22

## 2020-06-08 RX ADMIN — GLYCOPYRROLATE AND FORMOTEROL FUMARATE 2 PUFF: 9; 4.8 AEROSOL, METERED RESPIRATORY (INHALATION) at 19:29

## 2020-06-08 RX ADMIN — OXYCODONE HYDROCHLORIDE AND ACETAMINOPHEN 1 TABLET: 7.5; 325 TABLET ORAL at 05:02

## 2020-06-08 RX ADMIN — LOSARTAN POTASSIUM 50 MG: 100 TABLET, FILM COATED ORAL at 09:30

## 2020-06-08 RX ADMIN — Medication 10 ML: at 09:30

## 2020-06-08 RX ADMIN — METOPROLOL SUCCINATE 25 MG: 50 TABLET, EXTENDED RELEASE ORAL at 09:30

## 2020-06-08 RX ADMIN — INSULIN GLARGINE 30 UNITS: 100 INJECTION, SOLUTION SUBCUTANEOUS at 21:10

## 2020-06-08 RX ADMIN — NYSTATIN: 100000 CREAM TOPICAL at 09:34

## 2020-06-08 RX ADMIN — CALCIUM ACETATE 667 MG: 667 CAPSULE ORAL at 11:58

## 2020-06-08 RX ADMIN — GABAPENTIN 100 MG: 100 CAPSULE ORAL at 21:10

## 2020-06-08 RX ADMIN — DULOXETINE HYDROCHLORIDE 30 MG: 30 CAPSULE, DELAYED RELEASE ORAL at 09:30

## 2020-06-08 RX ADMIN — NEPHROCAP 1 MG: 1 CAP ORAL at 18:26

## 2020-06-08 RX ADMIN — HEPARIN SODIUM 5000 UNITS: 5000 INJECTION INTRAVENOUS; SUBCUTANEOUS at 21:10

## 2020-06-08 RX ADMIN — OXYCODONE HYDROCHLORIDE AND ACETAMINOPHEN 1 TABLET: 7.5; 325 TABLET ORAL at 13:28

## 2020-06-08 RX ADMIN — TRAZODONE HYDROCHLORIDE 150 MG: 50 TABLET ORAL at 21:31

## 2020-06-08 RX ADMIN — HYDRALAZINE HYDROCHLORIDE 25 MG: 25 TABLET, FILM COATED ORAL at 05:02

## 2020-06-08 RX ADMIN — PANTOPRAZOLE SODIUM 40 MG: 40 TABLET, DELAYED RELEASE ORAL at 05:01

## 2020-06-08 RX ADMIN — HEPARIN SODIUM 5000 UNITS: 5000 INJECTION INTRAVENOUS; SUBCUTANEOUS at 05:02

## 2020-06-08 RX ADMIN — CLOPIDOGREL BISULFATE 75 MG: 75 TABLET ORAL at 09:30

## 2020-06-08 RX ADMIN — HYDRALAZINE HYDROCHLORIDE 25 MG: 25 TABLET, FILM COATED ORAL at 18:22

## 2020-06-08 RX ADMIN — ASPIRIN 81 MG 81 MG: 81 TABLET ORAL at 09:30

## 2020-06-08 RX ADMIN — INSULIN LISPRO 3 UNITS: 100 INJECTION, SOLUTION INTRAVENOUS; SUBCUTANEOUS at 09:34

## 2020-06-08 RX ADMIN — QUETIAPINE FUMARATE 50 MG: 25 TABLET ORAL at 18:22

## 2020-06-08 RX ADMIN — HYDRALAZINE HYDROCHLORIDE 25 MG: 25 TABLET, FILM COATED ORAL at 21:10

## 2020-06-08 RX ADMIN — CALCIUM ACETATE 667 MG: 667 CAPSULE ORAL at 18:22

## 2020-06-08 RX ADMIN — ALBUTEROL SULFATE 2.5 MG: 2.5 SOLUTION RESPIRATORY (INHALATION) at 19:26

## 2020-06-08 RX ADMIN — GLYCOPYRROLATE AND FORMOTEROL FUMARATE 2 PUFF: 9; 4.8 AEROSOL, METERED RESPIRATORY (INHALATION) at 08:04

## 2020-06-08 RX ADMIN — PRAVASTATIN SODIUM 80 MG: 80 TABLET ORAL at 09:30

## 2020-06-08 RX ADMIN — OXYCODONE HYDROCHLORIDE AND ACETAMINOPHEN 1 TABLET: 7.5; 325 TABLET ORAL at 09:30

## 2020-06-08 RX ADMIN — OXYCODONE HYDROCHLORIDE AND ACETAMINOPHEN 1 TABLET: 7.5; 325 TABLET ORAL at 18:22

## 2020-06-08 RX ADMIN — ALBUTEROL SULFATE 2.5 MG: 2.5 SOLUTION RESPIRATORY (INHALATION) at 08:04

## 2020-06-08 RX ADMIN — INSULIN LISPRO 3 UNITS: 100 INJECTION, SOLUTION INTRAVENOUS; SUBCUTANEOUS at 18:27

## 2020-06-08 RX ADMIN — HYDROMORPHONE HYDROCHLORIDE 0.5 MG: 2 INJECTION, SOLUTION INTRAMUSCULAR; INTRAVENOUS; SUBCUTANEOUS at 21:11

## 2020-06-08 RX ADMIN — INSULIN LISPRO 6 UNITS: 100 INJECTION, SOLUTION INTRAVENOUS; SUBCUTANEOUS at 11:59

## 2020-06-08 ASSESSMENT — PAIN SCALES - GENERAL
PAINLEVEL_OUTOF10: 10
PAINLEVEL_OUTOF10: 9
PAINLEVEL_OUTOF10: 5
PAINLEVEL_OUTOF10: 10
PAINLEVEL_OUTOF10: 10
PAINLEVEL_OUTOF10: 5
PAINLEVEL_OUTOF10: 5
PAINLEVEL_OUTOF10: 10
PAINLEVEL_OUTOF10: 9

## 2020-06-08 ASSESSMENT — PAIN DESCRIPTION - LOCATION
LOCATION: BACK
LOCATION: LEG

## 2020-06-08 ASSESSMENT — PAIN DESCRIPTION - PAIN TYPE
TYPE: ACUTE PAIN
TYPE: ACUTE PAIN;CHRONIC PAIN

## 2020-06-08 ASSESSMENT — PAIN DESCRIPTION - DESCRIPTORS: DESCRIPTORS: DISCOMFORT

## 2020-06-08 ASSESSMENT — PAIN DESCRIPTION - ORIENTATION
ORIENTATION: RIGHT
ORIENTATION: RIGHT;POSTERIOR;UPPER

## 2020-06-08 ASSESSMENT — PAIN DESCRIPTION - PROGRESSION
CLINICAL_PROGRESSION: NOT CHANGED
CLINICAL_PROGRESSION: GRADUALLY IMPROVING
CLINICAL_PROGRESSION: NOT CHANGED
CLINICAL_PROGRESSION: GRADUALLY IMPROVING
CLINICAL_PROGRESSION: GRADUALLY IMPROVING

## 2020-06-08 ASSESSMENT — PAIN - FUNCTIONAL ASSESSMENT: PAIN_FUNCTIONAL_ASSESSMENT: PREVENTS OR INTERFERES WITH MANY ACTIVE NOT PASSIVE ACTIVITIES

## 2020-06-08 ASSESSMENT — PAIN DESCRIPTION - FREQUENCY: FREQUENCY: CONTINUOUS

## 2020-06-08 ASSESSMENT — PAIN DESCRIPTION - ONSET: ONSET: ON-GOING

## 2020-06-08 NOTE — PROGRESS NOTES
I called hd and they stated estimated time for treatment to be around 1 pm. I called vascular lab to update them, pt has doppler orders. Pt updated.

## 2020-06-08 NOTE — PROGRESS NOTES
Added Dr. Ken Bills to pt's treatmetn team @ [phone] 6/8/20 for physical medicine rehab consult. -[phone]

## 2020-06-08 NOTE — PROGRESS NOTES
Progress Note    HISTORY     CC:  Fall/Weakness           We are following for ESRD         Subjective/     The patient was seen and examined during dialysis; he feels well today with no CP, SOB, nausea or vomiting. ROS: No fever or chills. Social: No family at bedside. EXAM       Objective/     Vitals:    06/08/20 0451 06/08/20 0458 06/08/20 0922 06/08/20 1156   BP:  134/68 (!) 143/82 (!) 154/89   Pulse:  87 87 95   Resp:  14 16 16   Temp:  98 °F (36.7 °C) 97.9 °F (36.6 °C) 98 °F (36.7 °C)   TempSrc:       SpO2:  98% 94% 97%   Weight: 269 lb 12.8 oz (122.4 kg)      Height:         24HR INTAKE/OUTPUT:      Intake/Output Summary (Last 24 hours) at 6/8/2020 1257  Last data filed at 6/8/2020 0930  Gross per 24 hour   Intake 970 ml   Output 1000 ml   Net -30 ml     Constitutional:  Alert, awake, no apparent distress  Neck:  Normal thyroid, no masses   Cardiovascular:  Regular, no rub  Respiratory:  No distress, no wheezing  Abdomen: +bs, soft, nt, no masses   Musculoskeletal: + LE edema, no clubbing   Access:  Left [facility]     MEDICAL DECISION MAKING       Data/  Recent Labs     06/06/20  0521 06/08/20  1126   WBC 9.1 7.9   HGB 10.5* 9.7*   HCT 32.2* 29.5*   MCV 94.7 94.3    272     Recent Labs     06/06/20  0521 06/07/20  0531 06/08/20  1126   * 126* 121*   K 5.0 4.7 4.6   CL 92* 93* 84*   CO2 23 26 25   GLUCOSE 181* 179* 200*   BUN 47* 27* 34*   CREATININE 4.1* 3.3* 3.8*   LABGLOM 15* 20* 17*   GFRAA 19* 24* 20*       Assessment/     ESRD:  Schedule MWF, has working [facility]. Fistula ligated due to vascular access associated steal syndrome. Failed PD for volume issues due to elevated glucose.       Fall:  Progressive weakness without clear anatomic etiology. Left rehab AMA. On low dose gabapentin. Certainly, could be elevated potassium. CT of the leg shows right thigh hematoma. No surgical intervention required.       Anemia:  At target of hb 10 to 11     Diabetes:  Denied on kidney

## 2020-06-08 NOTE — DISCHARGE INSTR - COC
Hemodialysis-associated hypotension I95.3    Left arm pain M79.602    Dizziness R42    End stage renal disease on dialysis ([facility]) N18.6, Z99.2    Hypotension due to drugs I95.2    Acute diastolic CHF (congestive heart failure) ([facility]) I50.31    Neuromuscular disorder ([facility]) G70.9    Acute combined systolic and diastolic CHF, NYHA class 4 ([facility]) I50.41    Renovascular hypertension I15.0    Mixed hyperlipidemia E78.2    Cigarette nicotine dependence in remission F17.211    Acute respiratory failure ([facility]) J96.00    Pulmonary edema J81.1    Fluid overload O02.27    Diastolic dysfunction I26.50    Anemia of chronic disease D63.8    SOB (shortness of breath) R06.02    Steal syndrome of dialysis vascular access ([facility]) T82.898A    Chronic, continuous use of opioids F11.90    Chronic bronchitis ([facility]) J42    Nasal congestion R09.81    Hypercholesteremia E78.00    Syncope R55    Bradycardia R00.1    S/p TAVR (transcatheter aortic valve replacement), bioprosthetic Z95.3    Syncope and collapse R55    Atrial fibrillation ([address] I48.91    Former smoker Z87.891    Generalized weakness R53.1    DKA, type 1, not at goal [facility]) E10.10       Isolation/Infection:   Isolation          No Isolation        Patient Infection Status     Infection Onset Added Last Indicated Last Indicated By Review Planned Expiration Resolved Resolved By    None active    Resolved    COVID-19 Rule Out 05/04/20 05/04/20 05/04/20 COVID-19 (Ordered)   05/04/20 Rule-Out Test Resulted          Nurse Assessment:  Last Vital Signs: BP (!) 143/82   Pulse 87   Temp 97.9 °F (36.6 °C)   Resp 16   Ht 5' 9\" (1.753 m)   Wt 269 lb 12.8 oz (122.4 kg)   SpO2 94%   BMI 39.84 kg/m²     Last documented pain score (0-10 scale): Pain Level: 9  Last Weight:   Wt Readings from Last 1 Encounters:   06/08/20 269 lb 12.8 oz (122.4 kg)     Mental Status:  {IP PT MENTAL STATUS:20030}    IV Access:  {[facility] IV ACCESS:[id number]}    Nursing

## 2020-06-08 NOTE — PROGRESS NOTES
with full range of motion. No jugular venous distention. Trachea midline. Respiratory:  Normal respiratory effort. Clear to auscultation, bilaterally without Rales/Wheezes/Rhonchi. Cardiovascular: Regular rate and rhythm with normal S1/S2 without murmurs, rubs or gallops. Abdomen: Soft, non-tender, non-distended with normal bowel sounds. Musculoskeletal: No clubbing, cyanosis or edema bilaterally. Full range of motion without deformity. Skin: Skin color, texture, turgor normal.  No rashes or lesions. Neurologic:  Neurovascularly intact without any focal sensory/motor deficits. Cranial nerves: II-XII intact, grossly non-focal.  Psychiatric: Alert and oriented, thought content appropriate, normal insight  Capillary Refill: Brisk,< 3 seconds   Peripheral Pulses: +2 palpable, equal bilaterally       Labs:   Recent Labs     06/06/20  0521   WBC 9.1   HGB 10.5*   HCT 32.2*        Recent Labs     06/06/20  0521 06/07/20  0531   * 126*   K 5.0 4.7   CL 92* 93*   CO2 23 26   BUN 47* 27*   CREATININE 4.1* 3.3*   CALCIUM 9.0 8.5     No results for input(s): AST, ALT, BILIDIR, BILITOT, ALKPHOS in the last 72 hours. No results for input(s): INR in the last 72 hours.   Recent Labs     06/05/20  0924   CKTOTAL 167   TROPONINI 0.09*       Urinalysis:      Lab Results   Component Value Date    NITRU Negative 06/05/2020    WBCUA 10-20 06/05/2020    BACTERIA 2+ 06/05/2020    RBCUA 0-2 06/05/2020    BLOODU TRACE-INTACT 06/05/2020    SPECGRAV 1.020 06/05/2020    GLUCOSEU 250 06/05/2020       Consults:    IP CONSULT TO NEPHROLOGY  IP CONSULT TO HOSPITALIST  IP CONSULT TO NEPHROLOGY  IP CONSULT TO CARDIOLOGY  IP CONSULT TO ORTHOPEDIC SURGERY      Assessment/Plan:    Active Hospital Problems    Diagnosis    Acute on chronic combined systolic and diastolic heart failure (HCC) [I50.43]     Priority: High    Type 2 diabetes, uncontrolled, with neuropathy (HCC) [E11.40, E11.65]     Priority: High    CAD (coronary artery disease) [I25.10]     Priority: High    S/p TAVR (transcatheter aortic valve replacement), bioprosthetic [Z95.3]     Priority: Medium    Atrial fibrillation (HCC) [I48.91]    End stage renal disease on dialysis ([facility] [address] [N18.6, Z99.2]    Hyperkalemia [E87.5]    Obesity [E66.9]    HTN (hypertension), benign [I10]    Elevated troponin [R79.89]    Hematoma [T14. 8XXA]         Traumatic hematoma of the right side  Probably not infected  Symptomatic  Appears large enough to cause significant dysfunction especially with ambulation. I will consult orthopedic surgery for further evaluation and recommendations.     Fall in setting of generalized weakness  Does not seem to have any acute infection or cardiac condition. CK normal.  No rhabdomyolysis. However, fall seems to have caused a right thigh hematoma as explained above. PT OT consulted.     Elevated troponin  Seen by cardiology. Elevated troponin determined to be secondary to poor cardiac clearance. Nothing further needed, unless new symptoms arise. .     End-stage renal disease on hemodialysis  Nephrology to oversee hemodialysis  Tolerating hemodialysis well     Hyperkalemia  Resolved with medical management. Proceed with hemodialysis       Diabetes mellitus type 2 with hyperglycemia  Noncompliant with diet  Continue basal bolus insulin as well as sliding scale  Proceed with carb controlled diet  Blood sugar fluctuates.     Diabetic neuropathy  Continue gabapentin      HTN/CAD - w/ known CAD but no evidence of active signs/sxs of ischemia/failure. Currently controlled on home meds w/ vitals reviewed and documented as above. Chronic normocytic anemia secondary to chronic kidney disease  Monitor hemoglobin. Currently no need for transfusion     Leukocytosis on admission   White blood cell count down to normal.  Most likely reactive  Continue to monitor.     Obesity -  With Body mass index is 39.84 kg/m². Complicating assessment and treatment.  Placing

## 2020-06-09 LAB
ALBUMIN SERPL-MCNC: 3.5 G/DL (ref 3.4–5)
ANION GAP SERPL CALCULATED.3IONS-SCNC: 12 MMOL/L (ref 3–16)
BUN BLDV-MCNC: 21 MG/DL (ref 7–20)
CALCIUM SERPL-MCNC: 9.1 MG/DL (ref 8.3–10.6)
CHLORIDE BLD-SCNC: 92 MMOL/L (ref 99–110)
CO2: 24 MMOL/L (ref 21–32)
CREAT SERPL-MCNC: 3 MG/DL (ref 0.9–1.3)
GFR AFRICAN AMERICAN: 27
GFR NON-AFRICAN AMERICAN: 22
GLUCOSE BLD-MCNC: 167 MG/DL (ref 70–99)
GLUCOSE BLD-MCNC: 173 MG/DL (ref 70–99)
GLUCOSE BLD-MCNC: 174 MG/DL (ref 70–99)
GLUCOSE BLD-MCNC: 234 MG/DL (ref 70–99)
GLUCOSE BLD-MCNC: 236 MG/DL (ref 70–99)
HCT VFR BLD CALC: 30.6 % (ref 40.5–52.5)
HEMOGLOBIN: 10.2 G/DL (ref 13.5–17.5)
MCH RBC QN AUTO: 31.6 PG (ref 26–34)
MCHC RBC AUTO-ENTMCNC: 33.5 G/DL (ref 31–36)
MCV RBC AUTO: 94.1 FL (ref 80–100)
PDW BLD-RTO: 16.5 % (ref 12.4–15.4)
PERFORMED ON: ABNORMAL
PHOSPHORUS: 3.6 MG/DL (ref 2.5–4.9)
PLATELET # BLD: 308 K/UL (ref 135–450)
PMV BLD AUTO: 7.9 FL (ref 5–10.5)
POTASSIUM SERPL-SCNC: 4.7 MMOL/L (ref 3.5–5.1)
RBC # BLD: 3.25 M/UL (ref 4.2–5.9)
SODIUM BLD-SCNC: 128 MMOL/L (ref 136–145)
WBC # BLD: 7 K/UL (ref 4–11)

## 2020-06-09 PROCEDURE — 1200000000 HC SEMI PRIVATE

## 2020-06-09 PROCEDURE — 97110 THERAPEUTIC EXERCISES: CPT

## 2020-06-09 PROCEDURE — 6370000000 HC RX 637 (ALT 250 FOR IP): Performed by: INTERNAL MEDICINE

## 2020-06-09 PROCEDURE — 6360000002 HC RX W HCPCS: Performed by: NURSE PRACTITIONER

## 2020-06-09 PROCEDURE — 94640 AIRWAY INHALATION TREATMENT: CPT

## 2020-06-09 PROCEDURE — 85027 COMPLETE CBC AUTOMATED: CPT

## 2020-06-09 PROCEDURE — 2580000003 HC RX 258: Performed by: NURSE PRACTITIONER

## 2020-06-09 PROCEDURE — 97116 GAIT TRAINING THERAPY: CPT

## 2020-06-09 PROCEDURE — 80069 RENAL FUNCTION PANEL: CPT

## 2020-06-09 PROCEDURE — 6360000002 HC RX W HCPCS: Performed by: INTERNAL MEDICINE

## 2020-06-09 PROCEDURE — 36415 COLL VENOUS BLD VENIPUNCTURE: CPT

## 2020-06-09 PROCEDURE — 6370000000 HC RX 637 (ALT 250 FOR IP): Performed by: PEDIATRICS

## 2020-06-09 PROCEDURE — 6370000000 HC RX 637 (ALT 250 FOR IP): Performed by: NURSE PRACTITIONER

## 2020-06-09 RX ADMIN — HEPARIN SODIUM 5000 UNITS: 5000 INJECTION INTRAVENOUS; SUBCUTANEOUS at 21:48

## 2020-06-09 RX ADMIN — LOSARTAN POTASSIUM 50 MG: 100 TABLET, FILM COATED ORAL at 11:11

## 2020-06-09 RX ADMIN — TRAZODONE HYDROCHLORIDE 150 MG: 50 TABLET ORAL at 20:55

## 2020-06-09 RX ADMIN — CLOPIDOGREL BISULFATE 75 MG: 75 TABLET ORAL at 11:12

## 2020-06-09 RX ADMIN — HYDRALAZINE HYDROCHLORIDE 25 MG: 25 TABLET, FILM COATED ORAL at 21:48

## 2020-06-09 RX ADMIN — PANTOPRAZOLE SODIUM 40 MG: 40 TABLET, DELAYED RELEASE ORAL at 04:17

## 2020-06-09 RX ADMIN — GLYCOPYRROLATE AND FORMOTEROL FUMARATE 2 PUFF: 9; 4.8 AEROSOL, METERED RESPIRATORY (INHALATION) at 07:51

## 2020-06-09 RX ADMIN — INSULIN LISPRO 3 UNITS: 100 INJECTION, SOLUTION INTRAVENOUS; SUBCUTANEOUS at 11:14

## 2020-06-09 RX ADMIN — OXYCODONE HYDROCHLORIDE AND ACETAMINOPHEN 1 TABLET: 7.5; 325 TABLET ORAL at 04:17

## 2020-06-09 RX ADMIN — QUETIAPINE FUMARATE 50 MG: 25 TABLET ORAL at 17:30

## 2020-06-09 RX ADMIN — INSULIN LISPRO 3 UNITS: 100 INJECTION, SOLUTION INTRAVENOUS; SUBCUTANEOUS at 21:48

## 2020-06-09 RX ADMIN — Medication 10 ML: at 20:56

## 2020-06-09 RX ADMIN — NEPHROCAP 1 MG: 1 CAP ORAL at 11:11

## 2020-06-09 RX ADMIN — METOPROLOL SUCCINATE 25 MG: 50 TABLET, EXTENDED RELEASE ORAL at 11:11

## 2020-06-09 RX ADMIN — ASPIRIN 81 MG 81 MG: 81 TABLET ORAL at 11:10

## 2020-06-09 RX ADMIN — PRAVASTATIN SODIUM 80 MG: 80 TABLET ORAL at 11:11

## 2020-06-09 RX ADMIN — HEPARIN SODIUM 5000 UNITS: 5000 INJECTION INTRAVENOUS; SUBCUTANEOUS at 04:17

## 2020-06-09 RX ADMIN — HYDRALAZINE HYDROCHLORIDE 25 MG: 25 TABLET, FILM COATED ORAL at 15:07

## 2020-06-09 RX ADMIN — PANTOPRAZOLE SODIUM 40 MG: 40 TABLET, DELAYED RELEASE ORAL at 15:07

## 2020-06-09 RX ADMIN — PROMETHAZINE HYDROCHLORIDE 12.5 MG: 25 TABLET ORAL at 20:53

## 2020-06-09 RX ADMIN — NYSTATIN: 100000 CREAM TOPICAL at 11:12

## 2020-06-09 RX ADMIN — ALBUTEROL SULFATE 2.5 MG: 2.5 SOLUTION RESPIRATORY (INHALATION) at 12:42

## 2020-06-09 RX ADMIN — CALCIUM ACETATE 667 MG: 667 CAPSULE ORAL at 17:30

## 2020-06-09 RX ADMIN — GLYCOPYRROLATE AND FORMOTEROL FUMARATE 2 PUFF: 9; 4.8 AEROSOL, METERED RESPIRATORY (INHALATION) at 19:37

## 2020-06-09 RX ADMIN — CALCIUM ACETATE 667 MG: 667 CAPSULE ORAL at 11:10

## 2020-06-09 RX ADMIN — OXYCODONE HYDROCHLORIDE AND ACETAMINOPHEN 1 TABLET: 7.5; 325 TABLET ORAL at 20:54

## 2020-06-09 RX ADMIN — ALBUTEROL SULFATE 2.5 MG: 2.5 SOLUTION RESPIRATORY (INHALATION) at 15:40

## 2020-06-09 RX ADMIN — DULOXETINE HYDROCHLORIDE 30 MG: 30 CAPSULE, DELAYED RELEASE ORAL at 11:12

## 2020-06-09 RX ADMIN — ALBUTEROL SULFATE 2.5 MG: 2.5 SOLUTION RESPIRATORY (INHALATION) at 19:32

## 2020-06-09 RX ADMIN — HYDROMORPHONE HYDROCHLORIDE 0.5 MG: 2 INJECTION, SOLUTION INTRAMUSCULAR; INTRAVENOUS; SUBCUTANEOUS at 18:42

## 2020-06-09 RX ADMIN — Medication 10 ML: at 18:42

## 2020-06-09 RX ADMIN — HEPARIN SODIUM 5000 UNITS: 5000 INJECTION INTRAVENOUS; SUBCUTANEOUS at 15:08

## 2020-06-09 RX ADMIN — Medication 10 ML: at 15:08

## 2020-06-09 RX ADMIN — CALCIUM ACETATE 667 MG: 667 CAPSULE ORAL at 12:53

## 2020-06-09 RX ADMIN — INSULIN GLARGINE 30 UNITS: 100 INJECTION, SOLUTION SUBCUTANEOUS at 21:48

## 2020-06-09 RX ADMIN — GABAPENTIN 100 MG: 100 CAPSULE ORAL at 20:54

## 2020-06-09 RX ADMIN — OXYCODONE HYDROCHLORIDE AND ACETAMINOPHEN 1 TABLET: 7.5; 325 TABLET ORAL at 15:07

## 2020-06-09 RX ADMIN — INSULIN LISPRO 6 UNITS: 100 INJECTION, SOLUTION INTRAVENOUS; SUBCUTANEOUS at 17:33

## 2020-06-09 RX ADMIN — NYSTATIN: 100000 CREAM TOPICAL at 20:56

## 2020-06-09 RX ADMIN — OXYCODONE HYDROCHLORIDE AND ACETAMINOPHEN 1 TABLET: 7.5; 325 TABLET ORAL at 11:11

## 2020-06-09 RX ADMIN — ALBUTEROL SULFATE 2.5 MG: 2.5 SOLUTION RESPIRATORY (INHALATION) at 07:51

## 2020-06-09 RX ADMIN — HYDRALAZINE HYDROCHLORIDE 25 MG: 25 TABLET, FILM COATED ORAL at 04:16

## 2020-06-09 ASSESSMENT — PAIN DESCRIPTION - FREQUENCY
FREQUENCY: CONTINUOUS
FREQUENCY: CONTINUOUS

## 2020-06-09 ASSESSMENT — PAIN DESCRIPTION - PAIN TYPE
TYPE: ACUTE PAIN

## 2020-06-09 ASSESSMENT — PAIN SCALES - GENERAL
PAINLEVEL_OUTOF10: 6
PAINLEVEL_OUTOF10: 8
PAINLEVEL_OUTOF10: 10
PAINLEVEL_OUTOF10: 8
PAINLEVEL_OUTOF10: 9
PAINLEVEL_OUTOF10: 0
PAINLEVEL_OUTOF10: 9
PAINLEVEL_OUTOF10: 0
PAINLEVEL_OUTOF10: 10
PAINLEVEL_OUTOF10: 7
PAINLEVEL_OUTOF10: 7
PAINLEVEL_OUTOF10: 9

## 2020-06-09 ASSESSMENT — PAIN DESCRIPTION - ORIENTATION
ORIENTATION: RIGHT;POSTERIOR;UPPER
ORIENTATION: RIGHT
ORIENTATION: RIGHT

## 2020-06-09 ASSESSMENT — PAIN DESCRIPTION - LOCATION
LOCATION: LEG

## 2020-06-09 ASSESSMENT — PAIN DESCRIPTION - DESCRIPTORS
DESCRIPTORS: ACHING
DESCRIPTORS: ACHING

## 2020-06-09 ASSESSMENT — PAIN DESCRIPTION - ONSET
ONSET: ON-GOING
ONSET: ON-GOING

## 2020-06-09 ASSESSMENT — PAIN - FUNCTIONAL ASSESSMENT
PAIN_FUNCTIONAL_ASSESSMENT: ACTIVITIES ARE NOT PREVENTED
PAIN_FUNCTIONAL_ASSESSMENT: ACTIVITIES ARE NOT PREVENTED

## 2020-06-09 ASSESSMENT — PAIN DESCRIPTION - PROGRESSION
CLINICAL_PROGRESSION: GRADUALLY IMPROVING
CLINICAL_PROGRESSION: NOT CHANGED

## 2020-06-09 NOTE — PROGRESS NOTES
of Ambulatory dysfunction, Aortic stenosis, Arthritis, Asthma, Bilateral hilar adenopathy syndrome, CAD (coronary artery disease), Cardiomyopathy ([address] CHF (congestive heart failure) ([address] Chronic pain, COPD (chronic obstructive pulmonary disease) ([address] Depression, Diabetes mellitus ([address] Difficult intravenous access, Emphysema of lung ([address] ESRD (end stage renal disease) on dialysis ([address] Fear of needles, Gastric ulcer, GERD (gastroesophageal reflux disease), Heart valve problem, Hemodialysis patient ([address] History of spinal fracture, Hx of blood clots, Hyperlipidemia, Hypertension, MI (myocardial infarction) ([address] Neuromuscular disorder ([address] Numbness and tingling in left arm, Pneumonia, PONV (postoperative nausea and vomiting), Prolonged emergence from general anesthesia, Sleep apnea, Stroke ([address] TIA (transient ischemic attack), and Unspecified diseases of blood and blood-forming organs. has a past surgical history that includes Tonsillectomy; cyst removal (08/14/2013); Colonoscopy; Coronary angioplasty with stent (05/26/15); vascular surgery (aprx 2 years ago); Colonoscopy (2/29/2015); Upper gastrointestinal endoscopy (01/06/2016); Upper gastrointestinal endoscopy (01/29/2017); other surgical history (02/01/2017); Dialysis fistula creation (Left, 10/30/2017); Diagnostic Cardiac Cath Lab Procedure; other surgical history (2018); other surgical history (Bilateral, 06/26/2018); pr lap insertion tunneled intraperitoneal catheter (N/A, 9/21/2018); other surgical history (Right, 09/2018); Dialysis fistula creation (Left, 3/27/2019); other surgical history (05/28/2019); Endoscopy, colon, diagnostic; Catheter Removal (Right, 7/2/2019); and Aortic valve replacement (N/A, 10/15/2019).     Restrictions  Restrictions/Precautions  Restrictions/Precautions: General Precautions, Fall Risk  Position Activity Restriction  Other position/activity restrictions: High fall risk per nursing assessment, Up as tolerated; 1217)       Goals  Short term goals  Time Frame for Short term goals: 6/12/20  Short term goal 1: Pt will complete bed mobility with supervision. ; 6/9 SBA  Short term goal 2: Pt will complete sit<>stand with LRAD and supervision. ; 6/9 SBA  Short term goal 3: Pt will complete bed<>chair with LRAD and min A.; goal met 6/9 bed to chair with RW and SBA  Short term goal 4: Pt will ambulate 10 ft with LRAD and min A.; goal met 6/9 65 ft with RW  Short term goal 5: Pt will tolerate 10-15 reps of LE Exercises for strengthening and balance by 6/7/20.; goal met 6/9 for 4 LE exercises; continue  Patient Goals   Patient goals : \"get stronger\"    Plan    Plan  Times per week: 3-5x/week  Times per day: Daily  Plan weeks: 6/12/20  Specific instructions for Next Treatment: progress mobility as tolerated  Current Treatment Recommendations: Strengthening, Transfer Training, Endurance Training, Neuromuscular Re-education, Patient/Caregiver Education & Training, Balance Training, Gait Training, Home Exercise Program, Functional Mobility Training, Safety Education & Training  Safety Devices  Type of devices: All fall risk precautions in place, Gait belt, Patient at risk for falls, Nurse notified, Call light within reach, Left in chair, Chair alarm in place  Restraints  Initially in place: No     Therapy Time   Individual Concurrent Group Co-treatment   Time In 3421         Time Out 1155         Minutes 24              If pt is discharged prior to next therapy session, this note will serve as discharge summary.     Brad Castellanos, PT

## 2020-06-09 NOTE — PROGRESS NOTES
for transfusion. Follow serial labs. Reviewed and documented as above.     Leukocytosis - POArrival, likely reactive w/out evidence of infection.      Obesity -  With Body mass index is 40.99 kg/m². Complicating assessment and treatment. Placing patient at risk for multiple co-morbidities as well as early death and contributing to the patient's presentation. Counseled on weight loss.          DVT Prophylaxis: SQ Heparin  Diet: DIET CARB CONTROL; Daily Fluid Restriction: 1000 ml  Code Status: Full Code      PT/OT Eval Status: seen w/ recs for Inpatient Rehab.      Dispo - Likely Tues/Wed 9/10 [place] at the earliest.     Roseanne Tanner MD

## 2020-06-10 ENCOUNTER — TELEPHONE (OUTPATIENT)
Dept: ADMINISTRATIVE | Age: 50
End: 2020-06-10

## 2020-06-10 VITALS
SYSTOLIC BLOOD PRESSURE: 119 MMHG | WEIGHT: 263.67 LBS | OXYGEN SATURATION: 92 % | BODY MASS INDEX: 39.05 KG/M2 | HEIGHT: 69 IN | HEART RATE: 89 BPM | RESPIRATION RATE: 16 BRPM | DIASTOLIC BLOOD PRESSURE: 64 MMHG | TEMPERATURE: 98 F

## 2020-06-10 LAB
ALBUMIN SERPL-MCNC: 3.3 G/DL (ref 3.4–5)
ANION GAP SERPL CALCULATED.3IONS-SCNC: 10 MMOL/L (ref 3–16)
BUN BLDV-MCNC: 32 MG/DL (ref 7–20)
CALCIUM SERPL-MCNC: 8.8 MG/DL (ref 8.3–10.6)
CHLORIDE BLD-SCNC: 95 MMOL/L (ref 99–110)
CO2: 25 MMOL/L (ref 21–32)
CREAT SERPL-MCNC: 3.8 MG/DL (ref 0.9–1.3)
GFR AFRICAN AMERICAN: 20
GFR NON-AFRICAN AMERICAN: 17
GLUCOSE BLD-MCNC: 114 MG/DL (ref 70–99)
GLUCOSE BLD-MCNC: 164 MG/DL (ref 70–99)
GLUCOSE BLD-MCNC: 165 MG/DL (ref 70–99)
GLUCOSE BLD-MCNC: 177 MG/DL (ref 70–99)
HCT VFR BLD CALC: 27.4 % (ref 40.5–52.5)
HEMOGLOBIN: 9 G/DL (ref 13.5–17.5)
MCH RBC QN AUTO: 30.8 PG (ref 26–34)
MCHC RBC AUTO-ENTMCNC: 32.8 G/DL (ref 31–36)
MCV RBC AUTO: 93.9 FL (ref 80–100)
PDW BLD-RTO: 16.5 % (ref 12.4–15.4)
PERFORMED ON: ABNORMAL
PHOSPHORUS: 3.3 MG/DL (ref 2.5–4.9)
PLATELET # BLD: 306 K/UL (ref 135–450)
PMV BLD AUTO: 8.1 FL (ref 5–10.5)
POTASSIUM SERPL-SCNC: 4.5 MMOL/L (ref 3.5–5.1)
RBC # BLD: 2.92 M/UL (ref 4.2–5.9)
SODIUM BLD-SCNC: 130 MMOL/L (ref 136–145)
WBC # BLD: 8.4 K/UL (ref 4–11)

## 2020-06-10 PROCEDURE — 6370000000 HC RX 637 (ALT 250 FOR IP): Performed by: INTERNAL MEDICINE

## 2020-06-10 PROCEDURE — 6370000000 HC RX 637 (ALT 250 FOR IP): Performed by: NURSE PRACTITIONER

## 2020-06-10 PROCEDURE — 94660 CPAP INITIATION&MGMT: CPT

## 2020-06-10 PROCEDURE — 6360000002 HC RX W HCPCS: Performed by: INTERNAL MEDICINE

## 2020-06-10 PROCEDURE — 94640 AIRWAY INHALATION TREATMENT: CPT

## 2020-06-10 PROCEDURE — 90935 HEMODIALYSIS ONE EVALUATION: CPT

## 2020-06-10 PROCEDURE — 85027 COMPLETE CBC AUTOMATED: CPT

## 2020-06-10 PROCEDURE — 36415 COLL VENOUS BLD VENIPUNCTURE: CPT

## 2020-06-10 PROCEDURE — 6370000000 HC RX 637 (ALT 250 FOR IP): Performed by: PEDIATRICS

## 2020-06-10 PROCEDURE — 6360000002 HC RX W HCPCS: Performed by: NURSE PRACTITIONER

## 2020-06-10 PROCEDURE — 80069 RENAL FUNCTION PANEL: CPT

## 2020-06-10 PROCEDURE — 2580000003 HC RX 258: Performed by: NURSE PRACTITIONER

## 2020-06-10 PROCEDURE — 2580000003 HC RX 258

## 2020-06-10 RX ORDER — OXYCODONE HYDROCHLORIDE AND ACETAMINOPHEN 5; 325 MG/1; MG/1
1 TABLET ORAL EVERY 6 HOURS PRN
Qty: 28 TABLET | Refills: 0 | Status: SHIPPED | OUTPATIENT
Start: 2020-06-10 | End: 2020-06-17

## 2020-06-10 RX ORDER — SODIUM CHLORIDE 9 MG/ML
INJECTION, SOLUTION INTRAVENOUS
Status: COMPLETED
Start: 2020-06-10 | End: 2020-06-10

## 2020-06-10 RX ADMIN — HYDRALAZINE HYDROCHLORIDE 25 MG: 25 TABLET, FILM COATED ORAL at 06:07

## 2020-06-10 RX ADMIN — OXYCODONE HYDROCHLORIDE AND ACETAMINOPHEN 1 TABLET: 7.5; 325 TABLET ORAL at 16:29

## 2020-06-10 RX ADMIN — PANTOPRAZOLE SODIUM 40 MG: 40 TABLET, DELAYED RELEASE ORAL at 16:55

## 2020-06-10 RX ADMIN — HEPARIN SODIUM 5000 UNITS: 5000 INJECTION INTRAVENOUS; SUBCUTANEOUS at 16:29

## 2020-06-10 RX ADMIN — INSULIN LISPRO 3 UNITS: 100 INJECTION, SOLUTION INTRAVENOUS; SUBCUTANEOUS at 11:58

## 2020-06-10 RX ADMIN — CLOPIDOGREL BISULFATE 75 MG: 75 TABLET ORAL at 09:11

## 2020-06-10 RX ADMIN — HEPARIN SODIUM 5000 UNITS: 5000 INJECTION INTRAVENOUS; SUBCUTANEOUS at 06:08

## 2020-06-10 RX ADMIN — HEPARIN SODIUM 4000 UNITS: 1000 INJECTION INTRAVENOUS; SUBCUTANEOUS at 15:51

## 2020-06-10 RX ADMIN — CALCIUM ACETATE 667 MG: 667 CAPSULE ORAL at 09:11

## 2020-06-10 RX ADMIN — GLYCOPYRROLATE AND FORMOTEROL FUMARATE 2 PUFF: 9; 4.8 AEROSOL, METERED RESPIRATORY (INHALATION) at 07:58

## 2020-06-10 RX ADMIN — METOPROLOL SUCCINATE 25 MG: 50 TABLET, EXTENDED RELEASE ORAL at 09:11

## 2020-06-10 RX ADMIN — SODIUM CHLORIDE 1000 ML: 9 INJECTION, SOLUTION INTRAVENOUS at 15:52

## 2020-06-10 RX ADMIN — CALCIUM ACETATE 667 MG: 667 CAPSULE ORAL at 16:29

## 2020-06-10 RX ADMIN — INSULIN LISPRO 3 UNITS: 100 INJECTION, SOLUTION INTRAVENOUS; SUBCUTANEOUS at 09:10

## 2020-06-10 RX ADMIN — PRAVASTATIN SODIUM 80 MG: 80 TABLET ORAL at 09:11

## 2020-06-10 RX ADMIN — Medication 10 ML: at 09:11

## 2020-06-10 RX ADMIN — ASPIRIN 81 MG 81 MG: 81 TABLET ORAL at 09:11

## 2020-06-10 RX ADMIN — OXYCODONE HYDROCHLORIDE AND ACETAMINOPHEN 1 TABLET: 7.5; 325 TABLET ORAL at 06:08

## 2020-06-10 RX ADMIN — CALCIUM ACETATE 667 MG: 667 CAPSULE ORAL at 12:16

## 2020-06-10 RX ADMIN — ALBUTEROL SULFATE 2.5 MG: 2.5 SOLUTION RESPIRATORY (INHALATION) at 07:58

## 2020-06-10 RX ADMIN — NYSTATIN: 100000 CREAM TOPICAL at 09:19

## 2020-06-10 RX ADMIN — HYDRALAZINE HYDROCHLORIDE 25 MG: 25 TABLET, FILM COATED ORAL at 16:29

## 2020-06-10 RX ADMIN — PANTOPRAZOLE SODIUM 40 MG: 40 TABLET, DELAYED RELEASE ORAL at 06:07

## 2020-06-10 RX ADMIN — NEPHROCAP 1 MG: 1 CAP ORAL at 09:11

## 2020-06-10 RX ADMIN — OXYCODONE HYDROCHLORIDE AND ACETAMINOPHEN 1 TABLET: 7.5; 325 TABLET ORAL at 10:30

## 2020-06-10 RX ADMIN — DULOXETINE HYDROCHLORIDE 30 MG: 30 CAPSULE, DELAYED RELEASE ORAL at 09:11

## 2020-06-10 RX ADMIN — LOSARTAN POTASSIUM 50 MG: 100 TABLET, FILM COATED ORAL at 09:11

## 2020-06-10 ASSESSMENT — PAIN DESCRIPTION - DESCRIPTORS: DESCRIPTORS: ACHING

## 2020-06-10 ASSESSMENT — PAIN DESCRIPTION - PROGRESSION
CLINICAL_PROGRESSION: GRADUALLY WORSENING

## 2020-06-10 ASSESSMENT — PAIN SCALES - GENERAL
PAINLEVEL_OUTOF10: 8
PAINLEVEL_OUTOF10: 0
PAINLEVEL_OUTOF10: 8
PAINLEVEL_OUTOF10: 0
PAINLEVEL_OUTOF10: 8
PAINLEVEL_OUTOF10: 7
PAINLEVEL_OUTOF10: 8
PAINLEVEL_OUTOF10: 4

## 2020-06-10 ASSESSMENT — PAIN DESCRIPTION - ORIENTATION
ORIENTATION: RIGHT
ORIENTATION: RIGHT

## 2020-06-10 ASSESSMENT — PAIN DESCRIPTION - PAIN TYPE
TYPE: ACUTE PAIN
TYPE: ACUTE PAIN

## 2020-06-10 ASSESSMENT — PAIN DESCRIPTION - LOCATION
LOCATION: LEG
LOCATION: LEG

## 2020-06-10 ASSESSMENT — PAIN DESCRIPTION - ONSET: ONSET: ON-GOING

## 2020-06-10 ASSESSMENT — PAIN - FUNCTIONAL ASSESSMENT: PAIN_FUNCTIONAL_ASSESSMENT: ACTIVITIES ARE NOT PREVENTED

## 2020-06-10 ASSESSMENT — PAIN DESCRIPTION - FREQUENCY: FREQUENCY: CONTINUOUS

## 2020-06-10 NOTE — CARE COORDINATION
[facility]    DC order noted, all docs needed have been faxed to [facility] for home care services.     Home care to see patient within 24-48 hrs  Maria Duckworth RN, BSN CTN  [facility] [phone]

## 2020-06-10 NOTE — PROGRESS NOTES
06/09/20 2310   NIV Type   NIV Started/Stopped On   Equipment Type v60   Mode CPAP   Mask Type Full face mask   Mask Size Medium   Settings/Measurements   CPAP/EPAP 10 cmH2O   Resp 15   FiO2  21 %   Vt Exhaled 816 mL   Mask Leak (lpm) 10 lpm   Comfort Level Good   Using Accessory Muscles No   Breath Sounds   Right Upper Lobe Diminished   Right Middle Lobe Diminished   Right Lower Lobe Diminished   Left Upper Lobe Diminished   Left Lower Lobe Diminished   Patient Observation   Observations mepilex on nose   Alarm Settings   Alarms On Y   Press Low Alarm 6 cmH2O   High Pressure Alarm 30 cmH2O   Resp Rate Low Alarm 6   High Respiratory Rate 40 br/min

## 2020-06-10 NOTE — PROGRESS NOTES
Hospitalist Progress Note      PCP: Loye Curling, MD    Date of Admission: 6/4/2020    Chief Complaint: Weakness/Fall    Subjective: no new c/o. Medications:  Reviewed    Infusion Medications    dextrose       Scheduled Medications    b complex-C-folic acid  1 capsule Oral Daily    insulin lispro  0-18 Units Subcutaneous TID WC    insulin lispro  0-9 Units Subcutaneous Nightly    aspirin  81 mg Oral Daily    calcium acetate  667 mg Oral TID WC    clopidogrel  75 mg Oral Daily    DULoxetine  30 mg Oral Daily    hydrALAZINE  25 mg Oral 3 times per day    losartan  50 mg Oral Daily    metoprolol succinate  25 mg Oral Daily    nystatin   Topical BID    pantoprazole  40 mg Oral BID AC    QUEtiapine  50 mg Oral QPM    glycopyrrolate-formoterol  2 puff Inhalation BID    traZODone  150 mg Oral Nightly    sodium chloride flush  10 mL Intravenous 2 times per day    heparin (porcine)  5,000 Units Subcutaneous 3 times per day    albuterol  2.5 mg Nebulization Q4H WA    insulin glargine  30 Units Subcutaneous Nightly    pravastatin  80 mg Oral Daily    gabapentin  100 mg Oral Nightly    heparin (porcine)  2,000 Units Intravenous Once    heparin (porcine)  500 Units Intravenous Once     PRN Meds: HYDROmorphone, glucose, dextrose, glucagon (rDNA), dextrose, sodium chloride flush, acetaminophen **OR** acetaminophen, polyethylene glycol, promethazine **OR** ondansetron, calcium carbonate, oxyCODONE-acetaminophen, heparin (porcine), midodrine      Intake/Output Summary (Last 24 hours) at 6/10/2020 0907  Last data filed at 6/10/2020 0746  Gross per 24 hour   Intake 910 ml   Output 975 ml   Net -65 ml       Physical Exam Performed:    /81   Pulse 95   Temp 97.7 °F (36.5 °C) (Axillary)   Resp 13   Ht 5' 9\" (1.753 m)   Wt 253 lb 12 oz (115.1 kg)   SpO2 94%   BMI 37.47 kg/m²     General appearance: No apparent distress, appears stated age and cooperative.   HEENT: Pupils equal, round, and [I50.43]     Priority: High    Type 2 diabetes, uncontrolled, with neuropathy (HCC) [E11.40, E11.65]     Priority: High    CAD (coronary artery disease) [I25.10]     Priority: High    S/p TAVR (transcatheter aortic valve replacement), bioprosthetic [Z95.3]     Priority: Medium    Atrial fibrillation (HCC) [I48.91]    End stage renal disease on dialysis ([facility] [address] [N18.6, Z99.2]    Hyperkalemia [E87.5]    Obesity [E66.9]    HTN (hypertension), benign [I10]    Elevated troponin [R79.89]    Hematoma [T14. 8XXA]         Traumatic hematoma - R thigh, w/out evidence of infection. Symptomatic w/ pain and appears large enough to cause significant dysfunction especially with ambulation. Orthopedic surgery consulted and appreciated     Fall - 2nd to generalized weakness w/out focal deficits.      Troponin elevation - of unclear clinical significance w/ etiology clinically unable to determine, w/out signs/sxs of active ischemia. Followed serial troponins. Reviewed and documented as above. Seen by Cardiology - appreciated w/out recs for further w/up.     ESRD - on HD M/W/F. Nephrology consulted and appreciated. HyperKalemia - Likely 2nd to above. Follow serial labs. Reviewed and documented as above.     HypoNatremia - etiology clinically unable to determine but likely hypovolemic. Follow serial labs on IVF. Reviewed and documented as above. DM2 - uncontrolled on home Insulin - continued. Non-compliant w/ diet. Follow FSBS/SSI high regimen. Last HbA1c 10.6% dated this admission. Anticipate resuming/continuing home regimen at discharge.       HTN/CAD - w/ known CAD but no evidence of active signs/sxs of ischemia/failure. Currently controlled on home meds w/ vitals reviewed and documented as above. Anemia - likely 2nd to CKD, w/out evidence of active bleeding/hemolysis. Asymptomatic w/out indication for transfusion. Follow serial labs.   Reviewed and documented as above.     Leukocytosis - POArrival, likely

## 2020-06-10 NOTE — PROGRESS NOTES
Occupational Therapy  Pt is currently off of the floor for HD. OT will continue to follow as pt is willing and schedule allows.     Selena Acuna, [address] [facility]

## 2020-06-11 ENCOUNTER — TELEPHONE (OUTPATIENT)
Dept: FAMILY MEDICINE CLINIC | Age: 50
End: 2020-06-11

## 2020-06-11 NOTE — DISCHARGE SUMMARY
pantoprazole (PROTONIX) 40 MG tablet TAKE (1) TABLET BY MOUTH EACH MORNING BEFORE BREAKFAST, Disp-90 tablet, R-10Normal      albuterol (PROVENTIL) (2.5 MG/3ML) 0.083% nebulizer solution INHALE 1 VIAL VIA NEBULIZER EVERY 6 HOURS AS NEEDED FOR WHEEZING, Disp-300 mL, R-10Normal      hydrALAZINE (APRESOLINE) 50 MG tablet Take 0.5 tablets by mouth every 8 hours, Disp-90 tablet, R-0Print      nystatin (MYCOSTATIN) 434353 UNIT/GM cream Apply topically 2 times daily. , Disp-60 g, R-3, Normal      insulin lispro (HUMALOG) 100 UNIT/ML injection vial Inject 10 Units into the skin 3 times daily (before meals), Disp-1 vial, R-5Normal      Insulin Syringe-Needle U-100 30G X 1/2\" 0.5 ML MISC DAILY Starting Wed 3/4/2020, Disp-100 each, R-3, Normal      insulin aspart (NOVOLOG FLEXPEN) 100 UNIT/ML injection pen Inject 10 Units into the skin 3 times daily (before meals), Disp-5 pen, R-3Normal      nitroGLYCERIN (NITROSTAT) 0.4 MG SL tablet DISSOLVE 1 TABLET UNDER THE TONGUE AS NEEDED FOR CHEST PAIN EVERY 5 MINUTES UP TO 3 TIMES.  IF NO RELIEF CALL 911., Disp-25 tablet, R-10Normal      sennosides-docusate sodium (SENOKOT-S) 8.6-50 MG tablet Take 1 tablet by mouth daily, Disp-10 tablet, R-0Normal      pravastatin (PRAVACHOL) 80 MG tablet TAKE 1 TABLET BY MOUTH ONCE DAILY, Disp-90 tablet, R-3Normal      clopidogrel (PLAVIX) 75 MG tablet TAKE 1 TABLET BY MOUTH ONCE DAILY, Disp-90 tablet, R-3Normal      hydrOXYzine (VISTARIL) 50 MG capsule TAKE 1 TO 2 CAPSULES BY MOUTH NIGHTLY, Disp-180 capsule, R-10Normal      ondansetron (ZOFRAN ODT) 4 MG disintegrating tablet Take 1 tablet by mouth every 8 hours as needed for Nausea, Disp-15 tablet, R-0Print      calcium acetate (PHOSLO) 667 MG capsule Take 1 capsule by mouth 3 times daily (with meals), Disp-60 capsule, R-3Normal      traZODone (DESYREL) 150 MG tablet TAKE (1) TABLET BY MOUTH NIGHTLY, Disp-90 tablet, R-10Normal      !! ACCU-CHEK FASTCLIX LANCETS MISC Disp-300 each, R-3, NormalTEST 3-4 TIMES DAILY, AS DIRECTED      !! blood glucose test strips (FREESTYLE LITE) strip Disp-100 strip, R-3, NormalDaily As needed. glucose monitoring kit (FREESTYLE) monitoring kit DAILY Starting Mon 8/19/2019, Disp-1 kit, R-0, Normal      vitamin D (ERGOCALCIFEROL) 73131 units CAPS capsule TK 1 C PO WEEKLY, R-11Historical Med      Pediatric Multivitamins-Fl (MULTI VIT/FL) 0.25 MG CHEW Take 25 mg by mouth daily, Disp-90 tablet, R-1Normal      flunisolide (NASALIDE) 25 MCG/ACT (0.025%) SOLN Inhale 2 sprays into the lungs every 12 hours, Disp-1 Bottle, R-5Normal      Tiotropium Bromide-Olodaterol (STIOLTO RESPIMAT) 2.5-2.5 MCG/ACT AERS Inhale 2 puffs into the lungs daily, Disp-2 Inhaler, R-02 samples given:  Lot #[id number], Exp 7/21 & Lot #[id number], Exp 7/21NO PRINT      Polyethylene Glycol 3350 GRAN Starting Wed 5/2/2018, Historical Med      !! Glucose Blood (BLOOD GLUCOSE TEST STRIPS) STRP TEST 3-4 TIMES DAILY, AS DIRECTED, Disp-100 strip, R-3Normal      Blood Glucose Monitoring Suppl ADAM Disp-1 Device, R-0, NormalUSE AS DIRECTED. Alcohol Swabs PADS Disp-300 each, R-3, NormalUSE AS DIRECTED      albuterol sulfate  (90 Base) MCG/ACT inhaler Inhale 2 puffs into the lungs every 6 hours as needed for Wheezing, Disp-1 Inhaler, R-3Print      ipratropium-albuterol (DUONEB) 0.5-2.5 (3) MG/3ML SOLN nebulizer solution Inhale 3 mLs into the lungs every 6 hours as needed for Shortness of Breath, Disp-360 mL, R-1Print      !! Lancets MISC Disp-100 each, R-3, PrintTest daily      calcium carbonate (TUMS) 500 MG chewable tablet Take 1 tablet by mouth 3 times daily as needed for Heartburn. aspirin 81 MG chewable tablet Take 1 tablet by mouth daily. , Disp-30 tablet, R-2       !! - Potential duplicate medications found. Please discuss with provider. Time Spent on discharge is more than 30 minutes in the examination, evaluation, counseling and review of medications and discharge plan.       Signed:    Nic Souza

## 2020-06-12 ENCOUNTER — TELEPHONE (OUTPATIENT)
Dept: FAMILY MEDICINE CLINIC | Age: 50
End: 2020-06-12

## 2020-06-12 NOTE — TELEPHONE ENCOUNTER
[address] [facility] Initial Follow Up Call    Outreach made within 2 business days of discharge: Yes    Patient: Ziggy Mo Patient : 1970   MRN: [id number]  Reason for Admission: There are no discharge diagnoses documented for the most recent discharge.   Discharge Date: 6/10/20       Spoke with: voicemail    Discharge department/facility: [facility]    Scheduled appointment with PCP within 7-14 days    Follow Up  Future Appointments   Date Time Provider Nelly Darby   2020  1:30 PM [address] [place] [address]

## 2020-06-16 ENCOUNTER — TELEPHONE (OUTPATIENT)
Dept: FAMILY MEDICINE CLINIC | Age: 50
End: 2020-06-16

## 2020-06-18 ENCOUNTER — OFFICE VISIT (OUTPATIENT)
Dept: FAMILY MEDICINE CLINIC | Age: 50
End: 2020-06-18
Payer: COMMERCIAL

## 2020-06-18 VITALS
HEART RATE: 85 BPM | TEMPERATURE: 97 F | OXYGEN SATURATION: 99 % | WEIGHT: 256 LBS | BODY MASS INDEX: 37.8 KG/M2 | SYSTOLIC BLOOD PRESSURE: 138 MMHG | DIASTOLIC BLOOD PRESSURE: 84 MMHG

## 2020-06-18 PROCEDURE — G8926 SPIRO NO PERF OR DOC: HCPCS | Performed by: FAMILY MEDICINE

## 2020-06-18 PROCEDURE — 3023F SPIROM DOC REV: CPT | Performed by: FAMILY MEDICINE

## 2020-06-18 PROCEDURE — 1111F DSCHRG MED/CURRENT MED MERGE: CPT | Performed by: FAMILY MEDICINE

## 2020-06-18 PROCEDURE — 3017F COLORECTAL CA SCREEN DOC REV: CPT | Performed by: FAMILY MEDICINE

## 2020-06-18 PROCEDURE — 99214 OFFICE O/P EST MOD 30 MIN: CPT | Performed by: FAMILY MEDICINE

## 2020-06-18 PROCEDURE — G8417 CALC BMI ABV UP PARAM F/U: HCPCS | Performed by: FAMILY MEDICINE

## 2020-06-18 PROCEDURE — 2022F DILAT RTA XM EVC RTNOPTHY: CPT | Performed by: FAMILY MEDICINE

## 2020-06-18 PROCEDURE — 3046F HEMOGLOBIN A1C LEVEL >9.0%: CPT | Performed by: FAMILY MEDICINE

## 2020-06-18 PROCEDURE — G8427 DOCREV CUR MEDS BY ELIG CLIN: HCPCS | Performed by: FAMILY MEDICINE

## 2020-06-18 PROCEDURE — 1036F TOBACCO NON-USER: CPT | Performed by: FAMILY MEDICINE

## 2020-06-18 RX ORDER — INSULIN GLARGINE 100 [IU]/ML
44 INJECTION, SOLUTION SUBCUTANEOUS NIGHTLY
Qty: 15 ML | Refills: 5 | Status: SHIPPED | OUTPATIENT
Start: 2020-06-18 | End: 2020-10-12 | Stop reason: SDUPTHER

## 2020-06-18 NOTE — PROGRESS NOTES
(DESYREL) 150 MG tablet TAKE (1) TABLET BY MOUTH NIGHTLY 90 tablet 10    blood glucose test strips (FREESTYLE LITE) strip Daily As needed. 100 strip 3    glucose monitoring kit (FREESTYLE) monitoring kit 1 kit by Does not apply route daily 1 kit 0    vitamin D (ERGOCALCIFEROL) 05590 units CAPS capsule TK 1 C PO WEEKLY  11    Pediatric Multivitamins-Fl (MULTI VIT/FL) 0.25 MG CHEW Take 25 mg by mouth daily 90 tablet 1    flunisolide (NASALIDE) 25 MCG/ACT (0.025%) SOLN Inhale 2 sprays into the lungs every 12 hours 1 Bottle 5    Tiotropium Bromide-Olodaterol (STIOLTO RESPIMAT) 2.5-2.5 MCG/ACT AERS Inhale 2 puffs into the lungs daily 2 Inhaler 0    Polyethylene Glycol 3350 GRAN       Glucose Blood (BLOOD GLUCOSE TEST STRIPS) STRP TEST 3-4 TIMES DAILY, AS DIRECTED 100 strip 3    Blood Glucose Monitoring Suppl ADAM USE AS DIRECTED. 1 Device 0    Alcohol Swabs PADS USE AS DIRECTED 300 each 3    albuterol sulfate  (90 Base) MCG/ACT inhaler Inhale 2 puffs into the lungs every 6 hours as needed for Wheezing 1 Inhaler 3    ipratropium-albuterol (DUONEB) 0.5-2.5 (3) MG/3ML SOLN nebulizer solution Inhale 3 mLs into the lungs every 6 hours as needed for Shortness of Breath 360 mL 1    Lancets MISC Test daily 100 each 3    calcium carbonate (TUMS) 500 MG chewable tablet Take 1 tablet by mouth 3 times daily as needed for Heartburn.  aspirin 81 MG chewable tablet Take 1 tablet by mouth daily. (Patient taking differently: Take 81 mg by mouth daily Indications: stopped on 6/25 for surgery ) 30 tablet 2    ACCU-CHEK FASTCLIX LANCETS MISC TEST 3-4 TIMES DAILY, AS DIRECTED (Patient not taking: Reported on 6/18/2020) 300 each 3     No current facility-administered medications for this visit.         Allergies   Allergen Reactions    Morphine Nausea And Vomiting       Review of Systems (report of symptoms present in the last month?):  Unexplained weight loss/gain: denies symptoms  Night pain and/or sleep

## 2020-06-29 ENCOUNTER — CARE COORDINATION (OUTPATIENT)
Dept: CARE COORDINATION | Age: 50
End: 2020-06-29

## 2020-06-29 ENCOUNTER — TELEPHONE (OUTPATIENT)
Dept: FAMILY MEDICINE CLINIC | Age: 50
End: 2020-06-29
Payer: COMMERCIAL

## 2020-06-29 PROCEDURE — G0180 MD CERTIFICATION HHA PATIENT: HCPCS | Performed by: FAMILY MEDICINE

## 2020-07-06 ENCOUNTER — TELEPHONE (OUTPATIENT)
Dept: FAMILY MEDICINE CLINIC | Age: 50
End: 2020-07-06

## 2020-07-06 RX ORDER — LINACLOTIDE 145 UG/1
CAPSULE, GELATIN COATED ORAL
Qty: 30 CAPSULE | Refills: 10 | Status: SHIPPED | OUTPATIENT
Start: 2020-07-06 | End: 2021-05-25

## 2020-07-06 NOTE — TELEPHONE ENCOUNTER
Ansley with [facility] called and needs a call back regarding the mobility evaluation. Please call.

## 2020-07-08 NOTE — TELEPHONE ENCOUNTER
Needs the prescription to state PT/OT evaluation. They also need documentation of his stroke. The note needs to include information for how he is going to use the wheelchair and needs to address r/o what he has tried and why it didn't work for him. She is going to fax everything back so you can do an addendum to answer the questions that medicare requires in your note on 6/18. I received the paperwork today can you addend your note?

## 2020-07-09 ENCOUNTER — HOSPITAL ENCOUNTER (OUTPATIENT)
Dept: CARDIOLOGY | Age: 50
Discharge: HOME | End: 2020-07-09
Payer: COMMERCIAL

## 2020-07-09 LAB
LV EF: 50 %
LVEF MODALITY: NORMAL

## 2020-07-09 PROCEDURE — 93306 TTE W/DOPPLER COMPLETE: CPT

## 2020-07-10 ENCOUNTER — TELEPHONE (OUTPATIENT)
Dept: CARDIOLOGY CLINIC | Age: 50
End: 2020-07-10

## 2020-07-10 NOTE — TELEPHONE ENCOUNTER
----- Message from Morteza Xavier MD sent at 7/9/2020  4:52 PM EDT -----  Call  Echo looks good  Valve fxn well

## 2020-07-13 ENCOUNTER — TELEPHONE (OUTPATIENT)
Dept: FAMILY MEDICINE CLINIC | Age: 50
End: 2020-07-13

## 2020-07-13 RX ORDER — PRAVASTATIN SODIUM 80 MG/1
TABLET ORAL
Qty: 90 TABLET | Refills: 3 | Status: SHIPPED | OUTPATIENT
Start: 2020-07-13 | End: 2020-09-29

## 2020-07-13 RX ORDER — CLOPIDOGREL BISULFATE 75 MG/1
TABLET ORAL
Qty: 90 TABLET | Refills: 3 | Status: SHIPPED | OUTPATIENT
Start: 2020-07-13 | End: 2020-10-02 | Stop reason: SDUPTHER

## 2020-07-13 NOTE — TELEPHONE ENCOUNTER
Ansley informed and she states that the insurance will not pay for this. She states that she will send all the information but she believes it will be denied because he needs it for outside use instead of inside and out.

## 2020-07-13 NOTE — TELEPHONE ENCOUNTER
Ansley with [facility] called and stated that they have a specialist and a therapist at the patient's home now to do a power wheel chair evaluation and the patient has decided he wants a scooter. Catrachita Perla stated that the patient is refusing to get a power wheel chair. Catrachita Perla states that she does not advise that the patient get a scooter because of his medical conditions and because it would be for outside use only. Catrachita Perla needs Dr. Gabriel Herrmann help on this. Please advise.

## 2020-07-24 RX ORDER — QUETIAPINE FUMARATE 25 MG/1
25 TABLET, FILM COATED ORAL EVERY EVENING
Qty: 30 TABLET | Refills: 5 | OUTPATIENT
Start: 2020-07-24

## 2020-08-05 ENCOUNTER — OFFICE VISIT (OUTPATIENT)
Dept: FAMILY MEDICINE CLINIC | Age: 50
End: 2020-08-05
Payer: COMMERCIAL

## 2020-08-05 VITALS
WEIGHT: 263.4 LBS | SYSTOLIC BLOOD PRESSURE: 138 MMHG | OXYGEN SATURATION: 97 % | BODY MASS INDEX: 38.9 KG/M2 | TEMPERATURE: 97.7 F | DIASTOLIC BLOOD PRESSURE: 86 MMHG | HEART RATE: 96 BPM

## 2020-08-05 PROCEDURE — G8417 CALC BMI ABV UP PARAM F/U: HCPCS | Performed by: FAMILY MEDICINE

## 2020-08-05 PROCEDURE — G8427 DOCREV CUR MEDS BY ELIG CLIN: HCPCS | Performed by: FAMILY MEDICINE

## 2020-08-05 PROCEDURE — 99214 OFFICE O/P EST MOD 30 MIN: CPT | Performed by: FAMILY MEDICINE

## 2020-08-05 PROCEDURE — 2022F DILAT RTA XM EVC RTNOPTHY: CPT | Performed by: FAMILY MEDICINE

## 2020-08-05 PROCEDURE — 3017F COLORECTAL CA SCREEN DOC REV: CPT | Performed by: FAMILY MEDICINE

## 2020-08-05 PROCEDURE — G8926 SPIRO NO PERF OR DOC: HCPCS | Performed by: FAMILY MEDICINE

## 2020-08-05 PROCEDURE — 3023F SPIROM DOC REV: CPT | Performed by: FAMILY MEDICINE

## 2020-08-05 PROCEDURE — 3046F HEMOGLOBIN A1C LEVEL >9.0%: CPT | Performed by: FAMILY MEDICINE

## 2020-08-05 PROCEDURE — 1036F TOBACCO NON-USER: CPT | Performed by: FAMILY MEDICINE

## 2020-08-05 RX ORDER — PREGABALIN 25 MG/1
25 CAPSULE ORAL 3 TIMES DAILY
Qty: 90 CAPSULE | Refills: 3 | Status: ON HOLD | OUTPATIENT
Start: 2020-08-05 | End: 2020-12-18

## 2020-08-05 RX ORDER — ONDANSETRON 4 MG/1
4 TABLET, ORALLY DISINTEGRATING ORAL EVERY 8 HOURS PRN
Qty: 60 TABLET | Refills: 0 | Status: SHIPPED | OUTPATIENT
Start: 2020-08-05 | End: 2021-10-26

## 2020-08-05 RX ORDER — TORSEMIDE 100 MG/1
100-200 TABLET ORAL DAILY
Qty: 180 TABLET | Refills: 3 | Status: ON HOLD | OUTPATIENT
Start: 2020-08-05 | End: 2021-04-27 | Stop reason: HOSPADM

## 2020-08-10 ENCOUNTER — TELEPHONE (OUTPATIENT)
Dept: FAMILY MEDICINE CLINIC | Age: 50
End: 2020-08-10

## 2020-08-10 NOTE — TELEPHONE ENCOUNTER
----- Message from Kamille Ledezma sent at 8/10/2020 12:25 PM EDT -----  Subject: Message to Provider    QUESTIONS  Information for Provider? lisa with [facility] pharmacy prior authorization   the pregabalin 25 mg ph. [de-identified]  ---------------------------------------------------------------------------  --------------  CALL BACK INFO  What is the best way for the office to contact you? OK to leave message on   voicemail  Preferred Call Back Phone Number? [phone]  ---------------------------------------------------------------------------  --------------  SCRIPT ANSWERS  Relationship to Patient?  Self

## 2020-08-13 NOTE — PROGRESS NOTES
2020     Garrison Chery (:  1970) is a 44 y.o. male, here for evaluation of the following medical concerns:    Garrison Chery is a 44 y.o. male. Patient presents with:  Diabetes      Patient has been having increased neuropathic pain in his feet. Has tried and failed neurontin in th past because he could not tolerate. Has also tried and failed amitriptyline. Patient notes increasing numbness. The pain often keeps him up at night and it worsens. Lab Results       Component                Value               Date                       LABA1C                   10.6                2020            Lab Results       Component                Value               Date                       EAG                      257.5               2020            Patient notes that his breathing has been stable, but is easily short of breath with activity. Notes no chest pain. The patients PMH, surgical history, family history, medications, allergies were all reviewed and updated as appropriate today. Diabetes   He presents for his follow-up diabetic visit. He has type 2 diabetes mellitus. His disease course has been stable. There are no hypoglycemic associated symptoms. There are no diabetic associated symptoms. There are no hypoglycemic complications. Symptoms are stable. There are no diabetic complications. There are no known risk factors for coronary artery disease. Current diabetic treatment includes insulin injections. His weight is stable. He is following a diabetic diet. When asked about meal planning, he reported none. He has had a previous visit with a dietitian. His home blood glucose trend is fluctuating minimally. An ACE inhibitor/angiotensin II receptor blocker is being taken. He does not see a podiatrist.Eye exam is current. Review of Systems    Prior to Visit Medications    Medication Sig Taking?  Authorizing Provider   torsemide (DEMADEX) 100 MG tablet Take 1-2 tablets by mouth daily Yes Pallavi Hand MD   ondansetron (ZOFRAN ODT) 4 MG disintegrating tablet Take 1 tablet by mouth every 8 hours as needed for Nausea Yes Pallavi Hand MD   pregabalin (LYRICA) 25 MG capsule Take 1 capsule by mouth 3 times daily for 30 days. Yes Pallavi Hand MD   clopidogrel (PLAVIX) 75 MG tablet TAKE 1 TABLET BY MOUTH ONCE DAILY Yes Ginny Hoffman MD   pravastatin (PRAVACHOL) 80 MG tablet TAKE 1 TABLET BY MOUTH ONCE DAILY Yes Ginny Hoffman MD   LINZESS 145 MCG capsule TAKE 1 CAPSULE BY MOUTH EVERY MORNING BEFORE BREAKFAST Yes Pallavi Hand MD   Calcium Acetate, Phos Binder, 667 MG CAPS TAKE 1 CAPSULE BY MOUTH THREE TIMES DAILY WITH MEALS Yes Pallavi Hand MD   insulin glargine (BASAGLAR KWIKPEN) 100 UNIT/ML injection pen Inject 44 Units into the skin nightly Yes Pallavi Hand MD   tiZANidine (ZANAFLEX) 4 MG tablet TAKE 1 TABLET BY MOUTH THREE TIMES DAILY Yes Pallavi Hand MD   gabapentin (NEURONTIN) 100 MG capsule TAKE 1 TO 2 CAPSULES BY MOUTH THREE TIMES A DAY Yes Pallavi Hand MD   metoprolol succinate (TOPROL XL) 25 MG extended release tablet Take 1 tablet by mouth daily Yes Ginny Hoffman MD   B Complex-C-Folic Acid (VIRT-CAPS) 1 MG CAPS TK ONE C PO  QD Yes Pallavi Hand MD   QUEtiapine (SEROQUEL) 50 MG tablet Take 1 tablet by mouth every evening Yes Pallavi Hand MD   DULoxetine (CYMBALTA) 30 MG extended release capsule TAKE 1 CAPSULE BY MOUTH EVERY DAY Yes JAY Mendez - CNP   nystatin-triamcinolone (MYCOLOG II) 472218-3.1 UNIT/GM-% cream Apply topically 2 times daily.  Yes JAY Asher CNP   pantoprazole (PROTONIX) 40 MG tablet TAKE (1) TABLET BY MOUTH EACH MORNING BEFORE BREAKFAST Yes Pallavi Hand MD   albuterol (PROVENTIL) (2.5 MG/3ML) 0.083% nebulizer solution INHALE 1 VIAL VIA NEBULIZER EVERY 6 HOURS AS NEEDED FOR WHEEZING Yes Pallavi Hand MD   hydrALAZINE (APRESOLINE) 50 MG tablet Take 0.5 tablets by mouth every 8 hours Yes Bal Chan, APRN - CNP   nystatin (MYCOSTATIN) 412358 UNIT/GM cream Apply topically 2 times daily. Yes Parish Upton MD   Insulin Syringe-Needle U-100 30G X 1/2\" 0.5 ML MISC 1 each by Does not apply route daily Yes Parish Upton MD   insulin aspart (NOVOLOG FLEXPEN) 100 UNIT/ML injection pen Inject 10 Units into the skin 3 times daily (before meals) Yes Parish Upton MD   hydrOXYzine (VISTARIL) 50 MG capsule TAKE 1 TO 2 CAPSULES BY MOUTH NIGHTLY Yes Parish Upton MD   traZODone (DESYREL) 150 MG tablet TAKE (1) TABLET BY MOUTH NIGHTLY Yes Parish Upton MD   blood glucose test strips (FREESTYLE LITE) strip Daily As needed. Yes Parish Upton MD   glucose monitoring kit (FREESTYLE) monitoring kit 1 kit by Does not apply route daily Yes Parish Upton MD   vitamin D (ERGOCALCIFEROL) 18995 units CAPS capsule TK 1 C PO WEEKLY Yes Historical Provider, MD   Pediatric Multivitamins-Fl (MULTI VIT/FL) 0.25 MG CHEW Take 25 mg by mouth daily Yes Parish Upton MD   flunisolide (NASALIDE) 25 MCG/ACT (0.025%) SOLN Inhale 2 sprays into the lungs every 12 hours Yes Molina Brandt MD   Tiotropium Bromide-Olodaterol (STIOLTO RESPIMAT) 2.5-2.5 MCG/ACT AERS Inhale 2 puffs into the lungs daily Yes Molina Brandt MD   Polyethylene Glycol 3350 GRAN  Yes Historical Provider, MD   Glucose Blood (BLOOD GLUCOSE TEST STRIPS) STRP TEST 3-4 TIMES DAILY, AS DIRECTED Yes Jeremiah Hankins MD   Blood Glucose Monitoring Suppl ADAM USE AS DIRECTED. Yes Jeremiah Hankins MD   albuterol sulfate  (90 Base) MCG/ACT inhaler Inhale 2 puffs into the lungs every 6 hours as needed for Wheezing Yes Alexa Villafuerte MD   ipratropium-albuterol (DUONEB) 0.5-2.5 (3) MG/3ML SOLN nebulizer solution Inhale 3 mLs into the lungs every 6 hours as needed for Shortness of Breath Yes Jorge Levi MD   Lancets MISC Test daily Yes Chetna Woodruff MD   calcium carbonate (TUMS) 500 MG chewable tablet Take 1 tablet by mouth 3 times daily as needed for Heartburn.  Yes equal, round, and reactive to light. Neck:      Musculoskeletal: Normal range of motion and neck supple. Cardiovascular:      Rate and Rhythm: Normal rate and regular rhythm. Heart sounds: Normal heart sounds. No murmur. No friction rub. No gallop. Pulmonary:      Effort: Pulmonary effort is normal. No respiratory distress. Breath sounds: Normal breath sounds. No wheezing. Abdominal:      General: Bowel sounds are normal. There is no distension. Palpations: Abdomen is soft. Tenderness: There is no abdominal tenderness. Musculoskeletal: Normal range of motion. General: No tenderness. Skin:     General: Skin is warm and dry. Neurological:      Mental Status: He is alert and oriented to person, place, and time. Deep Tendon Reflexes: Reflexes are normal and symmetric. Comments: Decreased sensation in feet with pain with palpations. Psychiatric:         Behavior: Behavior normal.         Thought Content: Thought content normal.         Judgment: Judgment normal.         ASSESSMENT/PLAN:  1. Neuropathy  Failed gabapentin and amitriptyline  - pregabalin (LYRICA) 25 MG capsule; Take 1 capsule by mouth 3 times daily for 30 days. Dispense: 90 capsule; Refill: 3    2. Hypertensive heart disease with heart failure and stage 5 chronic kidney disease, not on chronic dialysis, unspecified heart failure type ([address]  Stable, continue current meds    3. Type 2 diabetes mellitus with diabetic nephropathy, with long-term current use of insulin (HCC)  Stable, lowering, doing well. 4. Cardiomyopathy, unspecified type ([address]      5. Chronic obstructive pulmonary disease, unspecified COPD type ([address]  Stable, continue current meds. No follow-ups on file. An electronic signature was used to authenticate this note.     --Royce Cardenas MD on 8/13/2020 at 7:43 AM

## 2020-08-14 NOTE — TELEPHONE ENCOUNTER
Received APPROVAL for Pregabalin 25MG capsules through 08/13/2021. Please notify patient. Thank you.

## 2020-09-01 RX ORDER — ISOSORBIDE MONONITRATE 30 MG/1
TABLET, EXTENDED RELEASE ORAL
Qty: 90 TABLET | Refills: 10 | Status: SHIPPED | OUTPATIENT
Start: 2020-09-01 | End: 2021-09-03

## 2020-09-28 RX ORDER — QUETIAPINE FUMARATE 50 MG/1
50 TABLET, FILM COATED ORAL EVERY EVENING
Qty: 30 TABLET | Refills: 5 | Status: SHIPPED | OUTPATIENT
Start: 2020-09-28 | End: 2021-03-25 | Stop reason: SDUPTHER

## 2020-09-28 RX ORDER — HYDRALAZINE HYDROCHLORIDE 50 MG/1
TABLET, FILM COATED ORAL
Qty: 90 TABLET | Refills: 0 | Status: SHIPPED | OUTPATIENT
Start: 2020-09-28 | End: 2020-11-24

## 2020-09-29 RX ORDER — PRAVASTATIN SODIUM 80 MG/1
TABLET ORAL
Qty: 90 TABLET | Refills: 3 | Status: ON HOLD | OUTPATIENT
Start: 2020-09-29 | End: 2021-04-27 | Stop reason: HOSPADM

## 2020-10-02 RX ORDER — CLOPIDOGREL BISULFATE 75 MG/1
TABLET ORAL
Qty: 90 TABLET | Refills: 3 | Status: ON HOLD | OUTPATIENT
Start: 2020-10-02 | End: 2021-04-27 | Stop reason: HOSPADM

## 2020-10-08 ENCOUNTER — TELEPHONE (OUTPATIENT)
Dept: FAMILY MEDICINE CLINIC | Age: 50
End: 2020-10-08

## 2020-10-08 NOTE — TELEPHONE ENCOUNTER
----- Message from [facility] sent at 10/8/2020  1:47 PM EDT -----  Subject: Appointment Request    Reason for Call: Urgent (Patient Request) Eye Problem    QUESTIONS  Type of Appointment? Established Patient  Reason for appointment request? No appointments available during search  Additional Information for Provider? Patient stated he is hearing a loud   popping sound when he rubs right eye. Sound is loud enough that others are   able to hear the sound also. Sugar level is high in upper 400's.   ---------------------------------------------------------------------------  --------------  CALL BACK INFO  What is the best way for the office to contact you? OK to leave message on   voicemail  Preferred Call Back Phone Number? [phone]  ---------------------------------------------------------------------------  --------------  SCRIPT ANSWERS  Relationship to Patient? Self  Appointment reason? Symptomatic  Select script based on patient symptoms? Adult Eye Problem [Pink Eye   Conjunctivitis   Glaucoma   Macular Degeneration]  Are you having eye pain? No  Have you had an injury or trauma? No  Have you had loss of vision? No  Are you having a headache? No  (Is the patient requesting to be seen urgently for their symptoms?)? Yes  Do you have a chronic eye condition? No  Have you seen a provider for these symptoms within the last 14 days? No  Have you been diagnosed with   tested for   or told that you are suspected of having COVID-19 (Coronavirus)? No  Have you had a fever or taken medication to treat a fever within the past   3 days? No  Have you had a cough   shortness of breath or flu-like symptoms within the past 3 days? No  Do you currently have flu-like symptoms including fever or chills   cough   shortness of breath   or difficulty breathing   or new loss of taste or smell? No  (Service Expert  click yes below to proceed with [facility] As Usual   Scheduling)?  Yes

## 2020-10-09 NOTE — TELEPHONE ENCOUNTER
Patient is on schedule for VV today. Would probably be better to be seen in person to have his eye and ear examined.   Please call to reschedule in person today with NP

## 2020-10-09 NOTE — TELEPHONE ENCOUNTER
Left voicemail asking patient to call the office so we can reschedule him to come into the office.  Has appt at 10.30 for virtual

## 2020-10-12 ENCOUNTER — OFFICE VISIT (OUTPATIENT)
Dept: FAMILY MEDICINE CLINIC | Age: 50
End: 2020-10-12
Payer: COMMERCIAL

## 2020-10-12 VITALS
HEART RATE: 94 BPM | BODY MASS INDEX: 38.4 KG/M2 | DIASTOLIC BLOOD PRESSURE: 70 MMHG | TEMPERATURE: 97.4 F | WEIGHT: 260 LBS | OXYGEN SATURATION: 95 % | SYSTOLIC BLOOD PRESSURE: 130 MMHG

## 2020-10-12 PROCEDURE — G8417 CALC BMI ABV UP PARAM F/U: HCPCS | Performed by: FAMILY MEDICINE

## 2020-10-12 PROCEDURE — G8484 FLU IMMUNIZE NO ADMIN: HCPCS | Performed by: FAMILY MEDICINE

## 2020-10-12 PROCEDURE — 99214 OFFICE O/P EST MOD 30 MIN: CPT | Performed by: FAMILY MEDICINE

## 2020-10-12 PROCEDURE — G8427 DOCREV CUR MEDS BY ELIG CLIN: HCPCS | Performed by: FAMILY MEDICINE

## 2020-10-12 PROCEDURE — 1036F TOBACCO NON-USER: CPT | Performed by: FAMILY MEDICINE

## 2020-10-12 PROCEDURE — 3017F COLORECTAL CA SCREEN DOC REV: CPT | Performed by: FAMILY MEDICINE

## 2020-10-12 PROCEDURE — 3046F HEMOGLOBIN A1C LEVEL >9.0%: CPT | Performed by: FAMILY MEDICINE

## 2020-10-12 PROCEDURE — 2022F DILAT RTA XM EVC RTNOPTHY: CPT | Performed by: FAMILY MEDICINE

## 2020-10-12 RX ORDER — INSULIN GLARGINE 100 [IU]/ML
60 INJECTION, SOLUTION SUBCUTANEOUS NIGHTLY
Qty: 15 ML | Refills: 5 | Status: SHIPPED | OUTPATIENT
Start: 2020-10-12 | End: 2020-11-10 | Stop reason: SDUPTHER

## 2020-10-12 RX ORDER — INSULIN ASPART 100 [IU]/ML
20 INJECTION, SOLUTION INTRAVENOUS; SUBCUTANEOUS
Qty: 15 PEN | Refills: 5 | Status: ON HOLD | OUTPATIENT
Start: 2020-10-12 | End: 2022-04-27 | Stop reason: SDUPTHER

## 2020-10-12 RX ORDER — HYDROXYZINE PAMOATE 50 MG/1
CAPSULE ORAL
Qty: 180 CAPSULE | Refills: 10 | Status: SHIPPED | OUTPATIENT
Start: 2020-10-12 | End: 2020-12-21

## 2020-10-15 ENCOUNTER — TELEPHONE (OUTPATIENT)
Dept: ADMINISTRATIVE | Age: 50
End: 2020-10-15

## 2020-10-25 NOTE — PROGRESS NOTES
10/12/2020     Geraldo Ervin (:  1970) is a 44 y.o. male, here for evaluation of the following medical concerns:    Geraldo Ervin is a 44 y.o. male. Patient presents with:  Discuss Labs  Diabetes        The patients PMH, surgical history, family history, medications, allergies were all reviewed and updated as appropriate today. Diabetes   He presents for his follow-up diabetic visit. He has type 2 diabetes mellitus. His disease course has been stable. There are no hypoglycemic associated symptoms. There are no diabetic associated symptoms. There are no hypoglycemic complications. Symptoms are stable. There are no diabetic complications. Risk factors for coronary artery disease include diabetes mellitus, dyslipidemia and hypertension. Current diabetic treatment includes insulin injections. He is compliant with treatment all of the time. His weight is fluctuating minimally. He is following a diabetic diet. When asked about meal planning, he reported none. He rarely participates in exercise. His home blood glucose trend is fluctuating minimally. An ACE inhibitor/angiotensin II receptor blocker is being taken. He does not see a podiatrist.Eye exam is not current. Other           Review of Systems    Prior to Visit Medications    Medication Sig Taking?  Authorizing Provider   hydrOXYzine (VISTARIL) 50 MG capsule TAKE 1 TO 2 CAPSULES BY MOUTH NIGHTLY Yes Darcie Millan MD   insulin glargine (BASAGLAR KWIKPEN) 100 UNIT/ML injection pen Inject 60 Units into the skin nightly Yes Darcie Millan MD   insulin aspart (NOVOLOG FLEXPEN) 100 UNIT/ML injection pen Inject 20 Units into the skin 3 times daily (before meals) Yes Darcie Millan MD   clopidogrel (PLAVIX) 75 MG tablet TAKE 1 TABLET BY MOUTH ONCE DAILY Yes Jarad Pitts MD   pravastatin (PRAVACHOL) 80 MG tablet TAKE (1) TABLET BY MOUTH ONCE DAILY Yes Jarad Pitts MD   QUEtiapine (SEROQUEL) 50 MG tablet TAKE 1 TABLET BY MOUTH EVERY EVENING Yes Arya Tobin MD   isosorbide mononitrate (IMDUR) 30 MG extended release tablet TAKE 1 TABLET BY MOUTH EVERY DAY Yes Arya Tobin MD   torsemide (DEMADEX) 100 MG tablet Take 1-2 tablets by mouth daily Yes Arya Tobin MD   ondansetron (ZOFRAN ODT) 4 MG disintegrating tablet Take 1 tablet by mouth every 8 hours as needed for Nausea Yes Arya Tobin MD   pregabalin (LYRICA) 25 MG capsule Take 1 capsule by mouth 3 times daily for 30 days. Yes Arya Tobin MD   LINZESS 145 MCG capsule TAKE 1 CAPSULE BY MOUTH EVERY MORNING BEFORE BREAKFAST Yes Arya Tobin MD   Calcium Acetate, Phos Binder, 667 MG CAPS TAKE 1 CAPSULE BY MOUTH THREE TIMES DAILY WITH MEALS Yes Arya Tobin MD   tiZANidine (ZANAFLEX) 4 MG tablet TAKE 1 TABLET BY MOUTH THREE TIMES DAILY Yes Arya Tobin MD   metoprolol succinate (TOPROL XL) 25 MG extended release tablet Take 1 tablet by mouth daily Yes Nissa Mari MD   B Complex-C-Folic Acid (VIRT-CAPS) 1 MG CAPS TK ONE C PO  QD Yes Arya Tobin MD   DULoxetine (CYMBALTA) 30 MG extended release capsule TAKE 1 CAPSULE BY MOUTH EVERY DAY Yes JAY Mendez CNP   nystatin-triamcinolone (MYCOLOG II) 169356-7.9 UNIT/GM-% cream Apply topically 2 times daily. Yes JAY Cobian CNP   pantoprazole (PROTONIX) 40 MG tablet TAKE (1) TABLET BY MOUTH EACH MORNING BEFORE BREAKFAST Yes Arya Tobin MD   albuterol (PROVENTIL) (2.5 MG/3ML) 0.083% nebulizer solution INHALE 1 VIAL VIA NEBULIZER EVERY 6 HOURS AS NEEDED FOR WHEEZING Yes Arya Tobin MD   nystatin (MYCOSTATIN) 579721 UNIT/GM cream Apply topically 2 times daily. Yes Arya Tobin MD   Insulin Syringe-Needle U-100 30G X 1/2\" 0.5 ML MISC 1 each by Does not apply route daily Yes Arya Tobin MD   traZODone (DESYREL) 150 MG tablet TAKE (1) TABLET BY MOUTH NIGHTLY Yes Arya Tobin MD   blood glucose test strips (FREESTYLE LITE) strip Daily As needed.  Yes Arya Tobin MD   glucose monitoring kit (FREESTYLE) monitoring kit 1 kit by Does not apply route daily Yes Darcie Millan MD   vitamin D (ERGOCALCIFEROL) 26524 units CAPS capsule TK 1 C PO WEEKLY Yes Historical Provider, MD   flunisolide (NASALIDE) 25 MCG/ACT (0.025%) SOLN Inhale 2 sprays into the lungs every 12 hours Yes Pablo Hurst MD   Tiotropium Bromide-Olodaterol (STIOLTO RESPIMAT) 2.5-2.5 MCG/ACT AERS Inhale 2 puffs into the lungs daily Yes Pablo Hurst MD   Polyethylene Glycol 3350 GRAN  Yes Historical Provider, MD   Glucose Blood (BLOOD GLUCOSE TEST STRIPS) STRP TEST 3-4 TIMES DAILY, AS DIRECTED Yes Jayson Eaton MD   Blood Glucose Monitoring Suppl ADAM USE AS DIRECTED. Yes Jayson Eaton MD   Alcohol Swabs PADS USE AS DIRECTED Yes Jayson Eaton MD   albuterol sulfate  (90 Base) MCG/ACT inhaler Inhale 2 puffs into the lungs every 6 hours as needed for Wheezing Yes Dulce Gonzalez MD   ipratropium-albuterol (DUONEB) 0.5-2.5 (3) MG/3ML SOLN nebulizer solution Inhale 3 mLs into the lungs every 6 hours as needed for Shortness of Breath Yes Cici Ferraro MD   Lancets MISC Test daily Yes Gómez Arana MD   calcium carbonate (TUMS) 500 MG chewable tablet Take 1 tablet by mouth 3 times daily as needed for Heartburn. Yes Historical Provider, MD   aspirin 81 MG chewable tablet Take 1 tablet by mouth daily.   Patient taking differently: Take 81 mg by mouth daily Indications: stopped on 6/25 for surgery  Yes Loy Marsh MD   hydrALAZINE (APRESOLINE) 50 MG tablet TAKE 1/2 TABLET BY MOUTH EVERY 8 HOURS  Patient not taking: Reported on 10/12/2020  Darcie Millan MD   gabapentin (NEURONTIN) 100 MG capsule TAKE 1 TO 2 CAPSULES BY MOUTH THREE TIMES A DAY  Darcie Millan MD   fluconazole (DIFLUCAN) 150 MG tablet TAKE 1 TABLET BY MOUTH 1 TIME FOR 1 DOSE  Patient not taking: Reported on 10/12/2020  Darcie Millan MD   losartan (COZAAR) 50 MG tablet TAKE (1) TABLET BY MOUTH DAILY  Patient not taking: Reported on 8/5/2020  Reagan Huff MD [place]   nitroGLYCERIN (NITROSTAT) 0.4 MG SL tablet DISSOLVE 1 TABLET UNDER THE TONGUE AS NEEDED FOR CHEST PAIN EVERY 5 MINUTES UP TO 3 TIMES. IF NO RELIEF CALL 911. Patient not taking: Reported on 2020  Morelia Hamilton MD        Social History     Tobacco Use    Smoking status: Former Smoker     Packs/day: 1.00     Years: 33.00     Pack years: 33.00     Types: Cigarettes     Last attempt to quit: 2020     Years since quittin.4    Smokeless tobacco: Never Used    Tobacco comment: TRYING TO QUIT 3-7 a day   Substance Use Topics    Alcohol use: Not Currently     Alcohol/week: 0.0 standard drinks     Comment: occ        Vitals:    10/12/20 1527 10/12/20 1541   BP: (!) 146/90 130/70   Pulse: 94    Temp: 97.4 °F (36.3 °C)    TempSrc: Temporal    SpO2: 95%    Weight: 260 lb (117.9 kg)      Estimated body mass index is 38.4 kg/m² as calculated from the following:    Height as of 20: 5' 9\" (1.753 m). Weight as of this encounter: 260 lb (117.9 kg). Physical Exam  Vitals signs and nursing note reviewed. Constitutional:       Appearance: Normal appearance. He is well-developed. HENT:      Head: Normocephalic and atraumatic. Right Ear: External ear normal.      Left Ear: External ear normal.      Nose: Nose normal.   Eyes:      Conjunctiva/sclera: Conjunctivae normal.      Pupils: Pupils are equal, round, and reactive to light. Neck:      Musculoskeletal: Normal range of motion and neck supple. Cardiovascular:      Rate and Rhythm: Normal rate and regular rhythm. Heart sounds: Normal heart sounds. No murmur. No friction rub. No gallop. Pulmonary:      Effort: Pulmonary effort is normal. No respiratory distress. Breath sounds: Normal breath sounds. No wheezing. Abdominal:      General: Abdomen is flat. Bowel sounds are normal. There is no distension. Palpations: Abdomen is soft. Tenderness: There is no abdominal tenderness.    Musculoskeletal: Normal range of motion. General: No tenderness. Skin:     General: Skin is warm and dry. Neurological:      Mental Status: He is alert and oriented to person, place, and time. Deep Tendon Reflexes: Reflexes are normal and symmetric. Psychiatric:         Behavior: Behavior normal.         Thought Content: Thought content normal.         Judgment: Judgment normal.         ASSESSMENT/PLAN:  1. Type 2 diabetes mellitus with diabetic peripheral angiopathy without gangrene, with long-term current use of insulin ([address]  Lab Results   Component Value Date    LABA1C 10.6 06/05/2020     Lab Results   Component Value Date    .5 06/05/2020     Not well controlled, increase dose of basaglar.  - POCT glycosylated hemoglobin (Hb A1C)      No follow-ups on file. An electronic signature was used to authenticate this note.     --Tomeka Villa MD on 10/24/2020 at 8:33 PM

## 2020-11-03 PROBLEM — E66.9 OBESITY: Status: RESOLVED | Noted: 2020-11-03 | Resolved: 2020-11-03

## 2020-11-03 PROBLEM — R55 SYNCOPE: Status: RESOLVED | Noted: 2019-10-19 | Resolved: 2020-11-03

## 2020-11-04 RX ORDER — CARVEDILOL 12.5 MG/1
TABLET ORAL
Qty: 60 TABLET | Refills: 10 | Status: ON HOLD | OUTPATIENT
Start: 2020-11-04 | End: 2020-12-18 | Stop reason: HOSPADM

## 2020-11-04 RX ORDER — DILTIAZEM HYDROCHLORIDE 180 MG/1
CAPSULE, COATED, EXTENDED RELEASE ORAL
Qty: 30 CAPSULE | Refills: 10 | Status: ON HOLD | OUTPATIENT
Start: 2020-11-04 | End: 2020-12-18 | Stop reason: HOSPADM

## 2020-11-04 RX ORDER — CITALOPRAM 40 MG/1
TABLET ORAL
Qty: 30 TABLET | Refills: 10 | Status: SHIPPED | OUTPATIENT
Start: 2020-11-04 | End: 2021-09-29

## 2020-11-04 RX ORDER — TRAZODONE HYDROCHLORIDE 150 MG/1
TABLET ORAL
Qty: 30 TABLET | Refills: 10 | Status: SHIPPED | OUTPATIENT
Start: 2020-11-04 | End: 2021-03-31 | Stop reason: SDUPTHER

## 2020-11-10 ENCOUNTER — TELEPHONE (OUTPATIENT)
Dept: FAMILY MEDICINE CLINIC | Age: 50
End: 2020-11-10

## 2020-11-10 ENCOUNTER — OFFICE VISIT (OUTPATIENT)
Dept: FAMILY MEDICINE CLINIC | Age: 50
End: 2020-11-10
Payer: COMMERCIAL

## 2020-11-10 VITALS
SYSTOLIC BLOOD PRESSURE: 144 MMHG | WEIGHT: 263 LBS | TEMPERATURE: 98 F | OXYGEN SATURATION: 99 % | BODY MASS INDEX: 38.84 KG/M2 | HEART RATE: 65 BPM | DIASTOLIC BLOOD PRESSURE: 62 MMHG

## 2020-11-10 LAB — HBA1C MFR BLD: 14 %

## 2020-11-10 PROCEDURE — G8417 CALC BMI ABV UP PARAM F/U: HCPCS | Performed by: FAMILY MEDICINE

## 2020-11-10 PROCEDURE — G8482 FLU IMMUNIZE ORDER/ADMIN: HCPCS | Performed by: FAMILY MEDICINE

## 2020-11-10 PROCEDURE — 2022F DILAT RTA XM EVC RTNOPTHY: CPT | Performed by: FAMILY MEDICINE

## 2020-11-10 PROCEDURE — G8427 DOCREV CUR MEDS BY ELIG CLIN: HCPCS | Performed by: FAMILY MEDICINE

## 2020-11-10 PROCEDURE — 99214 OFFICE O/P EST MOD 30 MIN: CPT | Performed by: FAMILY MEDICINE

## 2020-11-10 PROCEDURE — 83036 HEMOGLOBIN GLYCOSYLATED A1C: CPT | Performed by: FAMILY MEDICINE

## 2020-11-10 PROCEDURE — 3017F COLORECTAL CA SCREEN DOC REV: CPT | Performed by: FAMILY MEDICINE

## 2020-11-10 PROCEDURE — 3046F HEMOGLOBIN A1C LEVEL >9.0%: CPT | Performed by: FAMILY MEDICINE

## 2020-11-10 PROCEDURE — 1036F TOBACCO NON-USER: CPT | Performed by: FAMILY MEDICINE

## 2020-11-10 RX ORDER — FLASH GLUCOSE SENSOR
1 KIT MISCELLANEOUS
Qty: 6 EACH | Refills: 3 | Status: SHIPPED | OUTPATIENT
Start: 2020-11-10 | End: 2021-05-07 | Stop reason: ALTCHOICE

## 2020-11-10 RX ORDER — INSULIN GLARGINE 100 [IU]/ML
70 INJECTION, SOLUTION SUBCUTANEOUS NIGHTLY
Qty: 15 ML | Refills: 5 | Status: ON HOLD | OUTPATIENT
Start: 2020-11-10 | End: 2021-07-29

## 2020-11-10 NOTE — TELEPHONE ENCOUNTER
Call from pharmacy  Requesting clarification for FreeStyle meter and strips. Is the RX for the freestyle regular mi? Per MA the RX was for the regular mi.       Verbal given

## 2020-11-13 ENCOUNTER — HOSPITAL ENCOUNTER (OUTPATIENT)
Age: 50
Discharge: HOME | End: 2020-11-13
Payer: MEDICARE

## 2020-11-13 ENCOUNTER — HOSPITAL ENCOUNTER (OUTPATIENT)
Dept: GENERAL RADIOLOGY | Age: 50
Discharge: HOME | End: 2020-11-13
Payer: MEDICARE

## 2020-11-13 PROCEDURE — 71046 X-RAY EXAM CHEST 2 VIEWS: CPT

## 2020-11-18 RX ORDER — NEPHROCAP 1 MG
CAP ORAL
Qty: 90 CAPSULE | Refills: 1 | Status: SHIPPED | OUTPATIENT
Start: 2020-11-18 | End: 2021-09-20

## 2020-11-18 NOTE — TELEPHONE ENCOUNTER
Jeimy in [facility] is asking for a refill on [facility] - caps capsules. I did not see it on his med list to pend. Pt's last ov was 11/10/20. Please advise. Is this something he gets from Dr. Pablo Palomino?

## 2020-11-22 NOTE — PROGRESS NOTES
11/10/2020     April Vo (:  1970) is a 44 y.o. male, here for evaluation of the following medical concerns:    April Vo is a 44 y.o. male. Patient presents with:  Discuss Labs  Diabetes        The patients PMH, surgical history, family history, medications, allergies were all reviewed and updated as appropriate today. Diabetes   He presents for his follow-up diabetic visit. He has type 2 diabetes mellitus. His disease course has been stable. There are no hypoglycemic associated symptoms. There are no diabetic associated symptoms. There are no hypoglycemic complications. Symptoms are stable. There are no diabetic complications. Risk factors for coronary artery disease include diabetes mellitus, dyslipidemia and hypertension. Current diabetic treatment includes insulin injections. He is compliant with treatment all of the time. His weight is fluctuating minimally. He is following a diabetic diet. When asked about meal planning, he reported none. He rarely participates in exercise. His home blood glucose trend is fluctuating minimally. An ACE inhibitor/angiotensin II receptor blocker is being taken. He does not see a podiatrist.Eye exam is not current. Other           Review of Systems    Prior to Visit Medications    Medication Sig Taking?  Authorizing Provider   Continuous Blood Gluc Sensor (FREESTYLE STEPHANY 14 DAY SENSOR) MISC 1 each by Does not apply route every 14 days Yes Lea Mahan MD   insulin glargine (BASAGLAR KWIKPEN) 100 UNIT/ML injection pen Inject 70 Units into the skin nightly Yes Lea Mahan MD   dilTIAZem (CARDIZEM CD) 180 MG extended release capsule TAKE 1 CAPSULE BY MOUTH DAILY Yes Lea Mahan MD   traZODone (DESYREL) 150 MG tablet TAKE (1) TABLET BY MOUTH NIGHTLY Yes Lea Mahan MD   citalopram (CELEXA) 40 MG tablet TAKE (1) TABLET BY MOUTH DAILY Yes Lea Mahan MD   carvedilol (COREG) 12.5 MG tablet TAKE (1) TABLET BY MOUTH TWICE DAILY WITH MEALS Yes Alea Aguilar MD   hydrOXYzine (VISTARIL) 50 MG capsule TAKE 1 TO 2 CAPSULES BY MOUTH NIGHTLY Yes Alea Aguilar MD   insulin aspart (NOVOLOG FLEXPEN) 100 UNIT/ML injection pen Inject 20 Units into the skin 3 times daily (before meals) Yes Alea Aguilar MD   clopidogrel (PLAVIX) 75 MG tablet TAKE 1 TABLET BY MOUTH ONCE DAILY Yes Sherri Giraldo MD   pravastatin (PRAVACHOL) 80 MG tablet TAKE (1) TABLET BY MOUTH ONCE DAILY Yes Sherri Giraldo MD   QUEtiapine (SEROQUEL) 50 MG tablet TAKE 1 TABLET BY MOUTH EVERY EVENING Yes Alea Aguilar MD   hydrALAZINE (APRESOLINE) 50 MG tablet TAKE 1/2 TABLET BY MOUTH EVERY 8 HOURS Yes Alea Aguilar MD   isosorbide mononitrate (IMDUR) 30 MG extended release tablet TAKE 1 TABLET BY MOUTH EVERY DAY Yes Alea Aguilar MD   torsemide (DEMADEX) 100 MG tablet Take 1-2 tablets by mouth daily Yes Alea Aguilar MD   ondansetron (ZOFRAN ODT) 4 MG disintegrating tablet Take 1 tablet by mouth every 8 hours as needed for Nausea Yes Alea Aguilar MD   pregabalin (LYRICA) 25 MG capsule Take 1 capsule by mouth 3 times daily for 30 days. Yes Alea Aguilar MD   LINZESS 145 MCG capsule TAKE 1 CAPSULE BY MOUTH EVERY MORNING BEFORE BREAKFAST Yes Alea Aguilar MD   Calcium Acetate, Phos Binder, 667 MG CAPS TAKE 1 CAPSULE BY MOUTH THREE TIMES DAILY WITH MEALS Yes Alea Aguilar MD   tiZANidine (ZANAFLEX) 4 MG tablet TAKE 1 TABLET BY MOUTH THREE TIMES DAILY Yes Alea Aguilar MD   metoprolol succinate (TOPROL XL) 25 MG extended release tablet Take 1 tablet by mouth daily Yes Sherri Giraldo MD   fluconazole (DIFLUCAN) 150 MG tablet TAKE 1 TABLET BY MOUTH 1 TIME FOR 1 DOSE Yes Alea Aguilar MD   DULoxetine (CYMBALTA) 30 MG extended release capsule TAKE 1 CAPSULE BY MOUTH EVERY DAY Yes JAY Mendez CNP   nystatin-triamcinolone (MYCOLOG II) 970982-2.2 UNIT/GM-% cream Apply topically 2 times daily.  Yes JAY Patterson CNP   losartan (COZAAR) 50 MG tablet TAKE (1) TABLET BY MOUTH DAILY Yes Natali Tripp MD   pantoprazole (PROTONIX) 40 MG tablet TAKE (1) TABLET BY MOUTH EACH MORNING BEFORE BREAKFAST Yes Natali Tripp MD   albuterol (PROVENTIL) (2.5 MG/3ML) 0.083% nebulizer solution INHALE 1 VIAL VIA NEBULIZER EVERY 6 HOURS AS NEEDED FOR WHEEZING Yes Natali Tripp MD   nystatin (MYCOSTATIN) 921677 UNIT/GM cream Apply topically 2 times daily. Yes Natali Tripp MD   Insulin Syringe-Needle U-100 30G X 1/2\" 0.5 ML MISC 1 each by Does not apply route daily Yes Natali Tripp MD   vitamin D (ERGOCALCIFEROL) 33358 units CAPS capsule TK 1 C PO WEEKLY Yes Historical Provider, MD   flunisolide (NASALIDE) 25 MCG/ACT (0.025%) SOLN Inhale 2 sprays into the lungs every 12 hours Yes Mike Bassett MD   Tiotropium Bromide-Olodaterol (STIOLTO RESPIMAT) 2.5-2.5 MCG/ACT AERS Inhale 2 puffs into the lungs daily Yes Mike Bassett MD   Polyethylene Glycol 3350 GRAN  Yes Historical Provider, MD   Alcohol Swabs PADS USE AS DIRECTED Yes Juan Francisco Enriquez MD   albuterol sulfate  (90 Base) MCG/ACT inhaler Inhale 2 puffs into the lungs every 6 hours as needed for Wheezing Yes Fabio Jackson MD   ipratropium-albuterol (DUONEB) 0.5-2.5 (3) MG/3ML SOLN nebulizer solution Inhale 3 mLs into the lungs every 6 hours as needed for Shortness of Breath Yes Arlie Hashimoto, MD   calcium carbonate (TUMS) 500 MG chewable tablet Take 1 tablet by mouth 3 times daily as needed for Heartburn. Yes Historical Provider, MD   aspirin 81 MG chewable tablet Take 1 tablet by mouth daily.   Patient taking differently: Take 81 mg by mouth daily Indications: stopped on 6/25 for surgery  Yes Meredith De MD   B Complex-C-Folic Acid (VIRT-CAPS) 1 MG CAPS TK ONE C PO  QD  Natali Tripp MD   gabapentin (NEURONTIN) 100 MG capsule TAKE 1 TO 2 CAPSULES BY MOUTH THREE TIMES A DAY  Patient not taking: Reported on 11/10/2020  Natali Tripp MD   nitroGLYCERIN (NITROSTAT) 0.4 MG SL tablet DISSOLVE 1 TABLET UNDER THE TONGUE of motion and neck supple. Cardiovascular:      Rate and Rhythm: Normal rate and regular rhythm. Heart sounds: Normal heart sounds. No murmur. No friction rub. No gallop. Pulmonary:      Effort: Pulmonary effort is normal. No respiratory distress. Breath sounds: Normal breath sounds. No wheezing. Abdominal:      General: Abdomen is flat. Bowel sounds are normal. There is no distension. Palpations: Abdomen is soft. Tenderness: There is no abdominal tenderness. Musculoskeletal: Normal range of motion. General: No tenderness. Skin:     General: Skin is warm and dry. Neurological:      Mental Status: He is alert and oriented to person, place, and time. Deep Tendon Reflexes: Reflexes are normal and symmetric. Psychiatric:         Behavior: Behavior normal.         Thought Content: Thought content normal.         Judgment: Judgment normal.         ASSESSMENT/PLAN:  1. Type 2 diabetes mellitus with diabetic peripheral angiopathy without gangrene, with long-term current use of insulin (HCC)  Poorly controlled. - Continuous Blood Gluc  (FREESTYLE STEPHANY 2 READER SYSTM) ADAM; 1 each by Does not apply route once for 1 dose  Dispense: 1 Device; Refill: 0  - Continuous Blood Gluc Sensor (FREESTYLE STEPHANY 14 DAY SENSOR) MISC; 1 each by Does not apply route every 14 days  Dispense: 6 each; Refill: 3  - POCT glycosylated hemoglobin (Hb A1C)  - Yanique Demarco CNP, [facility]    2. Type 2 diabetes mellitus with diabetic nephropathy, with long-term current use of insulin (HCC)    - insulin glargine (BASAGLAR KWIKPEN) 100 UNIT/ML injection pen; Inject 70 Units into the skin nightly  Dispense: 15 mL; Refill: 5      No follow-ups on file. An electronic signature was used to authenticate this note.     --Chuyita Swan MD on 11/22/2020 at 10:34 AM

## 2020-11-24 ENCOUNTER — OFFICE VISIT (OUTPATIENT)
Dept: CARDIOLOGY CLINIC | Age: 50
End: 2020-11-24
Payer: COMMERCIAL

## 2020-11-24 VITALS
DIASTOLIC BLOOD PRESSURE: 70 MMHG | HEART RATE: 93 BPM | BODY MASS INDEX: 37.62 KG/M2 | SYSTOLIC BLOOD PRESSURE: 112 MMHG | OXYGEN SATURATION: 99 % | WEIGHT: 254 LBS | HEIGHT: 69 IN

## 2020-11-24 PROCEDURE — G8417 CALC BMI ABV UP PARAM F/U: HCPCS | Performed by: NURSE PRACTITIONER

## 2020-11-24 PROCEDURE — 99214 OFFICE O/P EST MOD 30 MIN: CPT | Performed by: NURSE PRACTITIONER

## 2020-11-24 PROCEDURE — 3017F COLORECTAL CA SCREEN DOC REV: CPT | Performed by: NURSE PRACTITIONER

## 2020-11-24 PROCEDURE — G8482 FLU IMMUNIZE ORDER/ADMIN: HCPCS | Performed by: NURSE PRACTITIONER

## 2020-11-24 PROCEDURE — 4004F PT TOBACCO SCREEN RCVD TLK: CPT | Performed by: NURSE PRACTITIONER

## 2020-11-24 PROCEDURE — G8427 DOCREV CUR MEDS BY ELIG CLIN: HCPCS | Performed by: NURSE PRACTITIONER

## 2020-11-24 RX ORDER — OXYCODONE AND ACETAMINOPHEN 7.5; 325 MG/1; MG/1
1 TABLET ORAL EVERY 4 HOURS PRN
Status: ON HOLD | COMMUNITY
End: 2020-12-18 | Stop reason: HOSPADM

## 2020-11-24 ASSESSMENT — ENCOUNTER SYMPTOMS: SHORTNESS OF BREATH: 1

## 2020-11-24 NOTE — PATIENT INSTRUCTIONS
Heart ultrasound and leg test, call [address] [place], [place] [address]LATANYA to schedule  Continue current medications  Try to increase activity  Follow up in 3 months

## 2020-11-24 NOTE — PROGRESS NOTES
[address]   Cardiology Note              Date:  November 24, 2020  Patientname: Caryn Christy  YOB: 1970    Primary Care physician: Brandan Jain MD    HISTORY OF PRESENT ILLNESS: Caryn Christy is a 44 y.o. male CAD, AS, HTN, PVD, ESRD, CVA, ZAINAB. He has a history of PATRICIA LAD in 5/2015. [facility] 2017 showed nonobstructive CAD and patent stent. He had PTA/stent R external iliac 5/2019. Echo 6/2019 showed EF 55%, bicuspid aortic valve with severe stenosis. [facility] 8/2019 showed patent LAD stent. He had TAVR 10/2019. Cardiac monitor 12/2019 no significant arrhythmia. Echo 7/2020 showed EF 50% normally functioning bioprosthetic aortic valve. Today he presents for follow up for CAD, HTN, AS post TAVR. He has chronic shortness of breath but feels it is worse over the last month. He has random chest pain that is unchanged, not associated with exertion, not similar to symptoms with PCI. He has leg pain with minimal walking, R>L, that is similar to pain prior to R iliac PTA. BP stable at home although he has hypotension with HD. He states he is not currently a kidney transplant candidate due to uncontrolled DM.     Past Medical History:   has a past medical history of Ambulatory dysfunction, Aortic stenosis, Arthritis, Asthma, Bilateral hilar adenopathy syndrome, CAD (coronary artery disease), Cardiomyopathy ([address] CHF (congestive heart failure) ([address] Chronic pain, COPD (chronic obstructive pulmonary disease) ([address] Depression, Diabetes mellitus ([address] Difficult intravenous access, Emphysema of lung ([address] ESRD (end stage renal disease) on dialysis ([address] Fear of needles, Gastric ulcer, GERD (gastroesophageal reflux disease), Heart valve problem, Hemodialysis patient ([address] History of spinal fracture, Hx of blood clots, Hyperlipidemia, Hypertension, MI (myocardial infarction) ([address] Neuromuscular disorder ([address] Numbness and tingling in left arm, Pneumonia, PONV (postoperative nausea and vomiting), Prolonged emergence from general anesthesia, Sleep apnea, Stroke ([address] TIA (transient ischemic attack), and Unspecified diseases of blood and blood-forming organs. Past Surgical History:   has a past surgical history that includes Tonsillectomy; cyst removal (08/14/2013); Colonoscopy; Coronary angioplasty with stent (05/26/15); vascular surgery (aprx 2 years ago); Colonoscopy (2/29/2015); Upper gastrointestinal endoscopy (01/06/2016); Upper gastrointestinal endoscopy (01/29/2017); other surgical history (02/01/2017); Dialysis fistula creation (Left, 10/30/2017); Diagnostic Cardiac Cath Lab Procedure; other surgical history (2018); other surgical history (Bilateral, 06/26/2018); pr lap insertion tunneled intraperitoneal catheter (N/A, 9/21/2018); other surgical history (Right, 09/2018); Dialysis fistula creation (Left, 3/27/2019); other surgical history (05/28/2019); Endoscopy, colon, diagnostic; Catheter Removal (Right, 7/2/2019); and Aortic valve replacement (N/A, 10/15/2019). Home Medications:    Prior to Admission medications    Medication Sig Start Date End Date Taking?  Authorizing Provider   B Complex-C-Folic Acid (VIRT-CAPS) 1 MG CAPS TK ONE C PO  QD 11/18/20   Milla Giraldo MD   Continuous Blood Gluc Sensor (FREESTYLE STEPHANY 14 DAY SENSOR) MISC 1 each by Does not apply route every 14 days 11/10/20   Milla Giraldo MD   insulin glargine [facility]) 100 UNIT/ML injection pen Inject 70 Units into the skin nightly 11/10/20   Milla Giraldo MD   dilTIAZem (CARDIZEM CD) 180 MG extended release capsule TAKE 1 CAPSULE BY MOUTH DAILY 11/4/20   Milla Giraldo MD   traZODone (DESYREL) 150 MG tablet TAKE (1) TABLET BY MOUTH NIGHTLY 11/4/20   Milla Giraldo MD   citalopram (CELEXA) 40 MG tablet TAKE (1) TABLET BY MOUTH DAILY 11/4/20   Milla Giraldo MD   carvedilol (COREG) 12.5 MG tablet TAKE (1) TABLET BY MOUTH TWICE DAILY WITH MEALS 11/4/20   Milla Giraldo MD   hydrOXYzine (VISTARIL) 50 MG capsule TAKE 1 TO 2 CAPSULES BY MOUTH NIGHTLY 10/12/20   Jaden Gross MD   insulin aspart (NOVOLOG FLEXPEN) 100 UNIT/ML injection pen Inject 20 Units into the skin 3 times daily (before meals) 10/12/20   Jaden Gross MD   clopidogrel (PLAVIX) 75 MG tablet TAKE 1 TABLET BY MOUTH ONCE DAILY 10/2/20   Mirta Barros MD   pravastatin (PRAVACHOL) 80 MG tablet TAKE (1) TABLET BY MOUTH ONCE DAILY 9/29/20   Mirta Barros MD   QUEtiapine (SEROQUEL) 50 MG tablet TAKE 1 TABLET BY MOUTH EVERY EVENING 9/28/20   Jaden Gross MD   hydrALAZINE (APRESOLINE) 50 MG tablet TAKE 1/2 TABLET BY MOUTH EVERY 8 HOURS 9/28/20   Jaden Gross MD   isosorbide mononitrate (IMDUR) 30 MG extended release tablet TAKE 1 TABLET BY MOUTH EVERY DAY 9/1/20   Jaden Gross MD   torsemide (DEMADEX) 100 MG tablet Take 1-2 tablets by mouth daily 8/5/20   Jaden Gross MD   ondansetron (ZOFRAN ODT) 4 MG disintegrating tablet Take 1 tablet by mouth every 8 hours as needed for Nausea 8/5/20   Jaden Gross MD   pregabalin (LYRICA) 25 MG capsule Take 1 capsule by mouth 3 times daily for 30 days.  8/5/20 11/10/20  Jaden Gross MD   LINZESS 145 MCG capsule TAKE 1 CAPSULE BY MOUTH EVERY MORNING BEFORE BREAKFAST 7/6/20   Jaden Gross MD   Calcium Acetate, Phos Binder, 667 MG CAPS TAKE 1 CAPSULE BY MOUTH THREE TIMES DAILY WITH MEALS 6/29/20   Jaden Gross MD   tiZANidine (ZANAFLEX) 4 MG tablet TAKE 1 TABLET BY MOUTH THREE TIMES DAILY 6/4/20   Jaden Gross MD   gabapentin (NEURONTIN) 100 MG capsule TAKE 1 TO 2 CAPSULES BY MOUTH THREE TIMES A DAY  Patient not taking: Reported on 11/10/2020 5/27/20 11/10/20  Jaden Gross MD   metoprolol succinate (TOPROL XL) 25 MG extended release tablet Take 1 tablet by mouth daily 5/26/20   Mirta Barros MD   fluconazole (DIFLUCAN) 150 MG tablet TAKE 1 TABLET BY MOUTH 1 TIME FOR 1 DOSE 5/14/20   Jaden Gross MD   DULoxetine (CYMBALTA) 30 MG extended release capsule TAKE 1 CAPSULE BY MOUTH EVERY DAY 4/3/20   Jeff JAY Ramírez - ILAN   nystatin-triamcinolone (MYCOLOG II) 821452-5.1 UNIT/GM-% cream Apply topically 2 times daily. 3/16/20   SaharaJAY Joshi CNP   losartan (COZAAR) 50 MG tablet TAKE (1) TABLET BY MOUTH DAILY 3/10/20   Rafy Dover MD   pantoprazole (PROTONIX) 40 MG tablet TAKE (1) TABLET BY MOUTH EACH MORNING BEFORE BREAKFAST 3/10/20   Rafy Dover MD   albuterol (PROVENTIL) (2.5 MG/3ML) 0.083% nebulizer solution INHALE 1 VIAL VIA NEBULIZER EVERY 6 HOURS AS NEEDED FOR WHEEZING 3/10/20   Rafy Dover MD   nystatin (MYCOSTATIN) 804923 UNIT/GM cream Apply topically 2 times daily. 3/4/20   Rafy Dover MD   Insulin Syringe-Needle U-100 30G X 1/2\" 0.5 ML MISC 1 each by Does not apply route daily 3/4/20   Rafy Dover MD   nitroGLYCERIN (NITROSTAT) 0.4 MG SL tablet DISSOLVE 1 TABLET UNDER THE TONGUE AS NEEDED FOR CHEST PAIN EVERY 5 MINUTES UP TO 3 TIMES. IF NO RELIEF CALL 911. Patient not taking: Reported on 8/5/2020 2/7/20   Rafy Dover MD   blood glucose test strips (FREESTYLE LITE) strip Daily As needed. Patient not taking: Reported on 11/10/2020 8/19/19   Rafy Dover MD   glucose monitoring kit (FREESTYLE) monitoring kit 1 kit by Does not apply route daily  Patient not taking: Reported on 11/10/2020 8/19/19   Rafy Dover MD   vitamin D (ERGOCALCIFEROL) 89567 units CAPS capsule TK 1 C PO WEEKLY 6/2/19   Historical Provider, MD   flunisolide (NASALIDE) 25 MCG/ACT (0.025%) SOLN Inhale 2 sprays into the lungs every 12 hours 5/22/19   Mel Davis MD   Tiotropium Bromide-Olodaterol (STIOLTO RESPIMAT) 2.5-2.5 MCG/ACT AERS Inhale 2 puffs into the lungs daily 5/21/19   Mel Davis MD   Polyethylene Glycol 3350 GRAN  5/2/18   Historical Provider, MD   Glucose Blood (BLOOD GLUCOSE TEST STRIPS) STRP TEST 3-4 TIMES DAILY, AS DIRECTED  Patient not taking: Reported on 11/10/2020 4/25/18   Rachel Sidhu MD   Blood Glucose Monitoring Suppl ADAM USE AS DIRECTED.   Patient not taking: Reported on 11/10/2020 4/25/18   Kelly Santos MD   Alcohol Swabs PADS USE AS DIRECTED 4/25/18   Kelly Santos MD   albuterol sulfate  (90 Base) MCG/ACT inhaler Inhale 2 puffs into the lungs every 6 hours as needed for Wheezing 11/8/17   Gina Ayon MD   ipratropium-albuterol (DUONEB) 0.5-2.5 (3) MG/3ML SOLN nebulizer solution Inhale 3 mLs into the lungs every 6 hours as needed for Shortness of Breath 10/15/17   Daniel Ferguson MD   Lancets MISC Test daily  Patient not taking: Reported on 11/10/2020 5/25/17   Desi Farias MD   calcium carbonate (TUMS) 500 MG chewable tablet Take 1 tablet by mouth 3 times daily as needed for Heartburn. Historical Provider, MD   aspirin 81 MG chewable tablet Take 1 tablet by mouth daily. Patient taking differently: Take 81 mg by mouth daily Indications: stopped on 6/25 for surgery  5/14/13   Merlin Dust, MD       Allergies:  Morphine    Social History:   reports that he quit smoking about 6 months ago. His smoking use included cigarettes. He has a 33.00 pack-year smoking history. He has never used smokeless tobacco. He reports previous alcohol use. He reports that he does not use drugs. Family History: family history includes Alcohol Abuse in his brother; Cancer in his sister and sister; Diabetes in his mother; Heart Disease in his father, sister, and sister; Kidney Disease in his sister; Obesity in his sister and sister. Review of Systems   Review of Systems   Constitutional: Negative. Respiratory: Positive for shortness of breath. Cardiovascular: Positive for chest pain and leg swelling. Negative for palpitations. Neurological: Negative.       OBJECTIVE:    Vital signs:    /70   Pulse 93   Ht 5' 9\" (1.753 m)   Wt 254 lb (115.2 kg)   SpO2 99%   BMI 37.51 kg/m²      Physical Exam:  Constitutional:  Comfortable and alert, NAD, appears older than stated age, chronically ill  Eyes: PERRL, sclera nonicteric  Neck: Supple, no masses, no thyroidmegaly, no JVD  Skin:  Warm and dry; no rash or lesions  Heart: Regular, normal apex, S1 and S2 normal, +murmur  Lungs:  Normal respiratory effort; clear; no wheezing/rhonchi/rales  Abdomen: soft, non tender, + bowel sounds  Extremities:  No edema or cyanosis; no clubbing  Neuro: alert and oriented, moves legs and arms equally, normal mood and affect    Data Reviewed: ABIs 6/8/2020:  DESTINEY's indicate mild bilateral lower extremity arterial disease.     <50% stenoses in the bilateral common, superficial femoral and popliteal     arteries. Tibial vessels not well visualized. Echo 7/9/2020:  Low Normal systolic function with an estimated ejection fraction of 50%. Left ventricular function and wall motion is difficult to estimate due to   poor endocardial visualization. Grade I diastolic dysfunction with normal filing pressure. Normally functioning TAVR 29 mm Langford Leyda S3 bioprosthetic valve in   aortic position appears well seated with a max velocity of 2.11 m/sec,   maximum gradient of 18 mmHg and a mean gradient of 8 mmHg. There is no evidence of aortic regurgitation. Coronary angiogram 8/22/2019:  LM <20%  LAD patent stent  Cx <20%  RCA 30%  Severe AS by echo  Proceed w/ TAVR    Cardiology Labs Reviewed:   CBC: No results for input(s): WBC, HGB, HCT, PLT in the last 72 hours. BMP:No results for input(s): NA, K, CO2, BUN, CREATININE, LABGLOM, GLUCOSE in the last 72 hours. PT/INR: No results for input(s): PROTIME, INR in the last 72 hours. APTT:No results for input(s): APTT in the last 72 hours. FASTING LIPID PANEL:  Lab Results   Component Value Date    HDL 49 08/22/2019    LDLDIRECT 199 09/04/2014    LDLCALC 75 08/22/2019    TRIG 231 08/22/2019     LIVER PROFILE:No results for input(s): AST, ALT, ALB in the last 72 hours.   BNP:   Lab Results   Component Value Date    PROBNP 3,866 04/10/2020    PROBNP 2,654 11/06/2019    PROBNP 4,929 10/31/2019    PROBNP 10,514

## 2020-11-25 ENCOUNTER — TELEPHONE (OUTPATIENT)
Dept: CARDIOLOGY | Age: 50
End: 2020-11-25

## 2020-11-30 ENCOUNTER — TELEPHONE (OUTPATIENT)
Dept: CARDIOLOGY CLINIC | Age: 50
End: 2020-11-30

## 2020-11-30 NOTE — TELEPHONE ENCOUNTER
Pt returned call to office. Relayed message per LER regarding metoprolol, carvedilol and monitor BP at home and to call us or nephrology if BP is elevated.

## 2020-12-01 ENCOUNTER — TELEPHONE (OUTPATIENT)
Dept: FAMILY MEDICINE CLINIC | Age: 50
End: 2020-12-01

## 2020-12-01 NOTE — TELEPHONE ENCOUNTER
FYI Patient states that he is not taking metoprolol. He is only taking the carvedilol. He will continue this.

## 2020-12-02 ENCOUNTER — TELEPHONE (OUTPATIENT)
Dept: ADMINISTRATIVE | Age: 50
End: 2020-12-02

## 2020-12-04 ENCOUNTER — TELEPHONE (OUTPATIENT)
Dept: ADMINISTRATIVE | Age: 50
End: 2020-12-04

## 2020-12-04 ENCOUNTER — APPOINTMENT (OUTPATIENT)
Dept: MRI IMAGING | Age: 50
DRG: 555 | End: 2020-12-04
Payer: COMMERCIAL

## 2020-12-04 ENCOUNTER — APPOINTMENT (OUTPATIENT)
Dept: CT IMAGING | Age: 50
DRG: 555 | End: 2020-12-04
Payer: COMMERCIAL

## 2020-12-04 ENCOUNTER — HOSPITAL ENCOUNTER (INPATIENT)
Age: 50
LOS: 17 days | Discharge: HOME HEALTH | DRG: 555 | End: 2020-12-21
Attending: EMERGENCY MEDICINE | Admitting: INTERNAL MEDICINE
Payer: COMMERCIAL

## 2020-12-04 DIAGNOSIS — G89.4 CHRONIC PAIN SYNDROME: ICD-10-CM

## 2020-12-04 DIAGNOSIS — I45.5 SINUS PAUSE: ICD-10-CM

## 2020-12-04 DIAGNOSIS — R20.2 NUMBNESS AND TINGLING OF BOTH LEGS: ICD-10-CM

## 2020-12-04 DIAGNOSIS — Z99.2 ESRD (END STAGE RENAL DISEASE) ON DIALYSIS (HCC): ICD-10-CM

## 2020-12-04 DIAGNOSIS — R20.0 NUMBNESS AND TINGLING OF BOTH LEGS: ICD-10-CM

## 2020-12-04 DIAGNOSIS — G62.9 NEUROPATHY: ICD-10-CM

## 2020-12-04 DIAGNOSIS — R29.898 BILATERAL LEG WEAKNESS: Primary | ICD-10-CM

## 2020-12-04 DIAGNOSIS — N18.6 ESRD (END STAGE RENAL DISEASE) ON DIALYSIS (HCC): ICD-10-CM

## 2020-12-04 LAB
A/G RATIO: 1.1 (ref 1.1–2.2)
ALBUMIN SERPL-MCNC: 3.7 G/DL (ref 3.4–5)
ALP BLD-CCNC: 96 U/L (ref 40–129)
ALT SERPL-CCNC: 18 U/L (ref 10–40)
ANION GAP SERPL CALCULATED.3IONS-SCNC: 14 MMOL/L (ref 3–16)
AST SERPL-CCNC: 35 U/L (ref 15–37)
BASOPHILS ABSOLUTE: 0.1 K/UL (ref 0–0.2)
BASOPHILS RELATIVE PERCENT: 0.4 %
BILIRUB SERPL-MCNC: <0.2 MG/DL (ref 0–1)
BUN BLDV-MCNC: 90 MG/DL (ref 7–20)
C-REACTIVE PROTEIN: 34 MG/L (ref 0–5.1)
CALCIUM SERPL-MCNC: 9.2 MG/DL (ref 8.3–10.6)
CHLORIDE BLD-SCNC: 84 MMOL/L (ref 99–110)
CO2: 25 MMOL/L (ref 21–32)
CREAT SERPL-MCNC: 6.9 MG/DL (ref 0.9–1.3)
EOSINOPHILS ABSOLUTE: 0.3 K/UL (ref 0–0.6)
EOSINOPHILS RELATIVE PERCENT: 2.8 %
GFR AFRICAN AMERICAN: 10
GFR NON-AFRICAN AMERICAN: 8
GLOBULIN: 3.4 G/DL
GLUCOSE BLD-MCNC: 256 MG/DL (ref 70–99)
GLUCOSE BLD-MCNC: 269 MG/DL (ref 70–99)
HCT VFR BLD CALC: 29.2 % (ref 40.5–52.5)
HEMOGLOBIN: 9.7 G/DL (ref 13.5–17.5)
LYMPHOCYTES ABSOLUTE: 1 K/UL (ref 1–5.1)
LYMPHOCYTES RELATIVE PERCENT: 7.9 %
MCH RBC QN AUTO: 29.5 PG (ref 26–34)
MCHC RBC AUTO-ENTMCNC: 33.3 G/DL (ref 31–36)
MCV RBC AUTO: 88.7 FL (ref 80–100)
MONOCYTES ABSOLUTE: 0.6 K/UL (ref 0–1.3)
MONOCYTES RELATIVE PERCENT: 4.6 %
NEUTROPHILS ABSOLUTE: 10.1 K/UL (ref 1.7–7.7)
NEUTROPHILS RELATIVE PERCENT: 84.3 %
PDW BLD-RTO: 13.5 % (ref 12.4–15.4)
PERFORMED ON: ABNORMAL
PLATELET # BLD: 258 K/UL (ref 135–450)
PMV BLD AUTO: 7.7 FL (ref 5–10.5)
POTASSIUM REFLEX MAGNESIUM: 5.2 MMOL/L (ref 3.5–5.1)
POTASSIUM SERPL-SCNC: 4.5 MMOL/L (ref 3.5–5.1)
RBC # BLD: 3.29 M/UL (ref 4.2–5.9)
SEDIMENTATION RATE, ERYTHROCYTE: 79 MM/HR (ref 0–20)
SODIUM BLD-SCNC: 123 MMOL/L (ref 136–145)
TOTAL PROTEIN: 7.1 G/DL (ref 6.4–8.2)
WBC # BLD: 12 K/UL (ref 4–11)

## 2020-12-04 PROCEDURE — 74176 CT ABD & PELVIS W/O CONTRAST: CPT

## 2020-12-04 PROCEDURE — 96376 TX/PRO/DX INJ SAME DRUG ADON: CPT

## 2020-12-04 PROCEDURE — 85025 COMPLETE CBC W/AUTO DIFF WBC: CPT

## 2020-12-04 PROCEDURE — 99285 EMERGENCY DEPT VISIT HI MDM: CPT

## 2020-12-04 PROCEDURE — 80053 COMPREHEN METABOLIC PANEL: CPT

## 2020-12-04 PROCEDURE — 72148 MRI LUMBAR SPINE W/O DYE: CPT

## 2020-12-04 PROCEDURE — 2580000003 HC RX 258: Performed by: INTERNAL MEDICINE

## 2020-12-04 PROCEDURE — 94660 CPAP INITIATION&MGMT: CPT

## 2020-12-04 PROCEDURE — 6370000000 HC RX 637 (ALT 250 FOR IP): Performed by: INTERNAL MEDICINE

## 2020-12-04 PROCEDURE — 6360000002 HC RX W HCPCS: Performed by: INTERNAL MEDICINE

## 2020-12-04 PROCEDURE — 96374 THER/PROPH/DIAG INJ IV PUSH: CPT

## 2020-12-04 PROCEDURE — 1200000000 HC SEMI PRIVATE

## 2020-12-04 PROCEDURE — 85652 RBC SED RATE AUTOMATED: CPT

## 2020-12-04 PROCEDURE — 86140 C-REACTIVE PROTEIN: CPT

## 2020-12-04 PROCEDURE — 96375 TX/PRO/DX INJ NEW DRUG ADDON: CPT

## 2020-12-04 PROCEDURE — 84132 ASSAY OF SERUM POTASSIUM: CPT

## 2020-12-04 PROCEDURE — 6360000002 HC RX W HCPCS: Performed by: NURSE PRACTITIONER

## 2020-12-04 RX ORDER — PANTOPRAZOLE SODIUM 40 MG/1
40 TABLET, DELAYED RELEASE ORAL
Status: DISCONTINUED | OUTPATIENT
Start: 2020-12-05 | End: 2020-12-14

## 2020-12-04 RX ORDER — TIZANIDINE 4 MG/1
2 TABLET ORAL 3 TIMES DAILY
Status: DISCONTINUED | OUTPATIENT
Start: 2020-12-04 | End: 2020-12-21 | Stop reason: HOSPADM

## 2020-12-04 RX ORDER — TORSEMIDE 100 MG/1
100 TABLET ORAL DAILY
Status: DISCONTINUED | OUTPATIENT
Start: 2020-12-05 | End: 2020-12-21 | Stop reason: HOSPADM

## 2020-12-04 RX ORDER — OXYCODONE AND ACETAMINOPHEN 7.5; 325 MG/1; MG/1
1 TABLET ORAL EVERY 4 HOURS PRN
Status: DISCONTINUED | OUTPATIENT
Start: 2020-12-04 | End: 2020-12-06

## 2020-12-04 RX ORDER — CLOPIDOGREL BISULFATE 75 MG/1
75 TABLET ORAL DAILY
Status: DISCONTINUED | OUTPATIENT
Start: 2020-12-05 | End: 2020-12-21 | Stop reason: HOSPADM

## 2020-12-04 RX ORDER — ONDANSETRON 2 MG/ML
4 INJECTION INTRAMUSCULAR; INTRAVENOUS EVERY 6 HOURS PRN
Status: DISCONTINUED | OUTPATIENT
Start: 2020-12-04 | End: 2020-12-21 | Stop reason: HOSPADM

## 2020-12-04 RX ORDER — ACETAMINOPHEN 325 MG/1
650 TABLET ORAL EVERY 6 HOURS PRN
Status: DISCONTINUED | OUTPATIENT
Start: 2020-12-04 | End: 2020-12-21 | Stop reason: HOSPADM

## 2020-12-04 RX ORDER — HEPARIN SODIUM 5000 [USP'U]/ML
5000 INJECTION, SOLUTION INTRAVENOUS; SUBCUTANEOUS EVERY 8 HOURS SCHEDULED
Status: DISCONTINUED | OUTPATIENT
Start: 2020-12-04 | End: 2020-12-21 | Stop reason: HOSPADM

## 2020-12-04 RX ORDER — GABAPENTIN 100 MG/1
100 CAPSULE ORAL 3 TIMES DAILY
Status: DISCONTINUED | OUTPATIENT
Start: 2020-12-04 | End: 2020-12-21 | Stop reason: HOSPADM

## 2020-12-04 RX ORDER — TRAZODONE HYDROCHLORIDE 50 MG/1
150 TABLET ORAL NIGHTLY
Status: DISCONTINUED | OUTPATIENT
Start: 2020-12-04 | End: 2020-12-21 | Stop reason: HOSPADM

## 2020-12-04 RX ORDER — DILTIAZEM HYDROCHLORIDE 180 MG/1
180 CAPSULE, COATED, EXTENDED RELEASE ORAL DAILY
Status: DISCONTINUED | OUTPATIENT
Start: 2020-12-05 | End: 2020-12-14

## 2020-12-04 RX ORDER — ACETAMINOPHEN 650 MG/1
650 SUPPOSITORY RECTAL EVERY 6 HOURS PRN
Status: DISCONTINUED | OUTPATIENT
Start: 2020-12-04 | End: 2020-12-21 | Stop reason: HOSPADM

## 2020-12-04 RX ORDER — ALBUTEROL SULFATE 90 UG/1
2 AEROSOL, METERED RESPIRATORY (INHALATION) EVERY 6 HOURS PRN
Status: DISCONTINUED | OUTPATIENT
Start: 2020-12-04 | End: 2020-12-05

## 2020-12-04 RX ORDER — ASPIRIN 81 MG/1
81 TABLET, CHEWABLE ORAL DAILY
Status: DISCONTINUED | OUTPATIENT
Start: 2020-12-05 | End: 2020-12-21 | Stop reason: HOSPADM

## 2020-12-04 RX ORDER — PRAVASTATIN SODIUM 80 MG/1
80 TABLET ORAL NIGHTLY
Status: DISCONTINUED | OUTPATIENT
Start: 2020-12-04 | End: 2020-12-21 | Stop reason: HOSPADM

## 2020-12-04 RX ORDER — CITALOPRAM 20 MG/1
40 TABLET ORAL DAILY
Status: DISCONTINUED | OUTPATIENT
Start: 2020-12-05 | End: 2020-12-04

## 2020-12-04 RX ORDER — PROMETHAZINE HYDROCHLORIDE 25 MG/1
12.5 TABLET ORAL EVERY 6 HOURS PRN
Status: DISCONTINUED | OUTPATIENT
Start: 2020-12-04 | End: 2020-12-21 | Stop reason: HOSPADM

## 2020-12-04 RX ORDER — HYDRALAZINE HYDROCHLORIDE 25 MG/1
25 TABLET, FILM COATED ORAL EVERY 8 HOURS SCHEDULED
Status: DISCONTINUED | OUTPATIENT
Start: 2020-12-04 | End: 2020-12-07

## 2020-12-04 RX ORDER — SODIUM CHLORIDE 0.9 % (FLUSH) 0.9 %
10 SYRINGE (ML) INJECTION PRN
Status: DISCONTINUED | OUTPATIENT
Start: 2020-12-04 | End: 2020-12-21 | Stop reason: HOSPADM

## 2020-12-04 RX ORDER — ONDANSETRON 2 MG/ML
4 INJECTION INTRAMUSCULAR; INTRAVENOUS EVERY 30 MIN PRN
Status: DISCONTINUED | OUTPATIENT
Start: 2020-12-04 | End: 2020-12-04

## 2020-12-04 RX ORDER — CARVEDILOL 6.25 MG/1
12.5 TABLET ORAL 2 TIMES DAILY WITH MEALS
Status: DISCONTINUED | OUTPATIENT
Start: 2020-12-05 | End: 2020-12-14

## 2020-12-04 RX ORDER — POLYETHYLENE GLYCOL 3350 17 G/17G
17 POWDER, FOR SOLUTION ORAL DAILY PRN
Status: DISCONTINUED | OUTPATIENT
Start: 2020-12-04 | End: 2020-12-15

## 2020-12-04 RX ORDER — INSULIN GLARGINE 100 [IU]/ML
70 INJECTION, SOLUTION SUBCUTANEOUS NIGHTLY
Status: DISCONTINUED | OUTPATIENT
Start: 2020-12-04 | End: 2020-12-21 | Stop reason: HOSPADM

## 2020-12-04 RX ORDER — SODIUM CHLORIDE 0.9 % (FLUSH) 0.9 %
10 SYRINGE (ML) INJECTION EVERY 12 HOURS SCHEDULED
Status: DISCONTINUED | OUTPATIENT
Start: 2020-12-04 | End: 2020-12-21 | Stop reason: HOSPADM

## 2020-12-04 RX ORDER — ISOSORBIDE MONONITRATE 30 MG/1
30 TABLET, EXTENDED RELEASE ORAL DAILY
Status: DISCONTINUED | OUTPATIENT
Start: 2020-12-05 | End: 2020-12-21 | Stop reason: HOSPADM

## 2020-12-04 RX ORDER — LOSARTAN POTASSIUM 25 MG/1
50 TABLET ORAL DAILY
Status: DISCONTINUED | OUTPATIENT
Start: 2020-12-05 | End: 2020-12-21 | Stop reason: HOSPADM

## 2020-12-04 RX ORDER — QUETIAPINE FUMARATE 25 MG/1
50 TABLET, FILM COATED ORAL EVERY EVENING
Status: DISCONTINUED | OUTPATIENT
Start: 2020-12-04 | End: 2020-12-21 | Stop reason: HOSPADM

## 2020-12-04 RX ORDER — DULOXETIN HYDROCHLORIDE 30 MG/1
30 CAPSULE, DELAYED RELEASE ORAL DAILY
Status: DISCONTINUED | OUTPATIENT
Start: 2020-12-05 | End: 2020-12-21 | Stop reason: HOSPADM

## 2020-12-04 RX ORDER — PREGABALIN 25 MG/1
25 CAPSULE ORAL 3 TIMES DAILY
Status: DISCONTINUED | OUTPATIENT
Start: 2020-12-04 | End: 2020-12-21 | Stop reason: HOSPADM

## 2020-12-04 RX ADMIN — INSULIN GLARGINE 70 UNITS: 100 INJECTION, SOLUTION SUBCUTANEOUS at 22:35

## 2020-12-04 RX ADMIN — TIZANIDINE 2 MG: 4 TABLET ORAL at 22:35

## 2020-12-04 RX ADMIN — SODIUM CHLORIDE, PRESERVATIVE FREE 10 ML: 5 INJECTION INTRAVENOUS at 22:41

## 2020-12-04 RX ADMIN — HYDRALAZINE HYDROCHLORIDE 25 MG: 25 TABLET, FILM COATED ORAL at 22:34

## 2020-12-04 RX ADMIN — PREGABALIN 25 MG: 25 CAPSULE ORAL at 22:34

## 2020-12-04 RX ADMIN — OXYCODONE HYDROCHLORIDE AND ACETAMINOPHEN 1 TABLET: 7.5; 325 TABLET ORAL at 20:43

## 2020-12-04 RX ADMIN — ONDANSETRON 4 MG: 2 INJECTION INTRAMUSCULAR; INTRAVENOUS at 15:05

## 2020-12-04 RX ADMIN — PRAVASTATIN SODIUM 80 MG: 80 TABLET ORAL at 22:35

## 2020-12-04 RX ADMIN — HYDROMORPHONE HYDROCHLORIDE 0.5 MG: 1 INJECTION, SOLUTION INTRAMUSCULAR; INTRAVENOUS; SUBCUTANEOUS at 18:41

## 2020-12-04 RX ADMIN — HYDROMORPHONE HYDROCHLORIDE 1 MG: 1 INJECTION, SOLUTION INTRAMUSCULAR; INTRAVENOUS; SUBCUTANEOUS at 15:05

## 2020-12-04 RX ADMIN — HEPARIN SODIUM 5000 UNITS: 5000 INJECTION INTRAVENOUS; SUBCUTANEOUS at 22:35

## 2020-12-04 RX ADMIN — INSULIN LISPRO 2 UNITS: 100 INJECTION, SOLUTION INTRAVENOUS; SUBCUTANEOUS at 22:36

## 2020-12-04 RX ADMIN — QUETIAPINE FUMARATE 50 MG: 25 TABLET ORAL at 22:34

## 2020-12-04 RX ADMIN — GABAPENTIN 100 MG: 100 CAPSULE ORAL at 22:34

## 2020-12-04 RX ADMIN — TRAZODONE HYDROCHLORIDE 150 MG: 50 TABLET ORAL at 22:34

## 2020-12-04 ASSESSMENT — PAIN DESCRIPTION - PAIN TYPE
TYPE: CHRONIC PAIN
TYPE: CHRONIC PAIN

## 2020-12-04 ASSESSMENT — PAIN DESCRIPTION - DESCRIPTORS: DESCRIPTORS: CONSTANT

## 2020-12-04 ASSESSMENT — PAIN SCALES - GENERAL
PAINLEVEL_OUTOF10: 10
PAINLEVEL_OUTOF10: 6

## 2020-12-04 ASSESSMENT — PAIN DESCRIPTION - ORIENTATION: ORIENTATION: RIGHT;LEFT

## 2020-12-04 NOTE — ED PROVIDER NOTES
transport. Nursing notes reviewed.    Past Medical History:   Diagnosis Date    Ambulatory dysfunction     walker for long distances, SOB with distance    Aortic stenosis     echo 2017    Arthritis     hands and hips    Asthma     Bilateral hilar adenopathy syndrome 6/3/2013    CAD (coronary artery disease)     Dr. Shola Hernandez [facility]) 04/19/2019    EF= 43%    CHF (congestive heart failure) (HCC)     Chronic pain     COPD (chronic obstructive pulmonary disease) ([address]     pulmonology Dr. Pro Duran     Diabetes mellitus ([address]     borderline    Difficult intravenous access     Emphysema of lung ([address]     ESRD (end stage renal disease) on dialysis ([address]     MWF    Fear of needles     Gastric ulcer     GERD (gastroesophageal reflux disease)     Heart valve problem     bicuspic valve    Hemodialysis patient ([address]     History of spinal fracture     work incident    Hx of blood clots     Bilateral lower extremities; stents in place    Hyperlipidemia     Hypertension     MI (myocardial infarction) ([address] 2019    has had 9 MIs. 2019 was the last    Neuromuscular disorder ([address]     due to CVA    Numbness and tingling in left arm     from fistula    Pneumonia     PONV (postoperative nausea and vomiting)     Prolonged emergence from general anesthesia     States requires more medication than most people    Sleep apnea     Uses CPAP    Stroke ([address]     7mm thalamic cva 2017 deficts left side, left side weakness    TIA (transient ischemic attack)     Unspecified diseases of blood and blood-forming organs      Past Surgical History:   Procedure Laterality Date    AORTIC VALVE REPLACEMENT N/A 10/15/2019    TRANSCATHETER AORTIC VALVE REPLACEMENT FEMORAL APPROACH performed by Gilberto Miner MD at [facility] [address] [address] Right 7/2/2019    PERITONEAL DIALYSIS CATHETER REMOVAL performed by Sandy Schultz MD at [facility] COLONOSCOPY  2/29/2015    WNL    CORONARY ANGIOPLASTY WITH STENT PLACEMENT  05/26/15    CYST REMOVAL  08/14/2013    EXCISION CYSTS, NECK X2 AND ABDOMINAL benign    DIAGNOSTIC CARDIAC CATH LAB PROCEDURE      DIALYSIS FISTULA CREATION Left 10/30/2017    LEFT BRACHIAL CEPHALIC FISTULA    DIALYSIS FISTULA CREATION Left 3/27/2019    LIGATION  AV FISTULA performed by Niharika Dooley MD at [facility]. [address], COLON, DIAGNOSTIC      OTHER SURGICAL HISTORY  02/01/2017    laparoscopic cholecystectomy with intraoperative cholangiogram    OTHER SURGICAL HISTORY  2018    PORT PLACEMENT  - vas cath    OTHER SURGICAL HISTORY Bilateral 06/26/2018    laprascopic peritoneal dialysis catheter placement    OTHER SURGICAL HISTORY Right 09/2018    peritoneal dialysis port placed on right side of abdomen    OTHER SURGICAL HISTORY  05/28/2019    PTA/Stenting R External Iliac artery    AR LAP INSERTION TUNNELED INTRAPERITONEAL CATHETER N/A 9/21/2018    LAPAROSCOPIC PERITONEAL DIALYSIS CATHETER REPLACEMENT performed by Hossein Marmolejo MD at [address] [place] [address]  01/06/2016    UPPER GASTROINTESTINAL ENDOSCOPY  01/29/2017    possible candida, otherwise normal appearing    VASCULAR SURGERY  aprx 2 years ago    2 stents placed, each side of groin     Family History   Problem Relation Age of Onset    Diabetes Mother     Heart Disease Father     Kidney Disease Sister         stage 4-kidney failure    Cancer Sister     Heart Disease Sister     Obesity Sister     Cancer Sister     Heart Disease Sister     Obesity Sister     Alcohol Abuse Brother      Social History     Socioeconomic History    Marital status:      Spouse name: Not on file    Number of children: Not on file    Years of education: Not on file    Highest education level: Not on file   Occupational History    Not on file   Social Needs    Financial resource strain: Not on file    Food insecurity Worry: Not on file     Inability: Not on file    Transportation needs     Medical: Not on file     Non-medical: Not on file   Tobacco Use    Smoking status: Current Every Day Smoker     Packs/day: 1.00     Years: 33.00     Pack years: 33.00     Types: Cigarettes     Last attempt to quit: 2020     Years since quittin.6    Smokeless tobacco: Never Used    Tobacco comment: TRYING TO QUIT 3-7 a day   Substance and Sexual Activity    Alcohol use: Not Currently     Alcohol/week: 0.0 standard drinks     Comment: occ    Drug use: No    Sexual activity: Yes     Partners: Female     Comment:    Lifestyle    Physical activity     Days per week: Not on file     Minutes per session: Not on file    Stress: Not on file   Relationships    Social connections     Talks on phone: Not on file     Gets together: Not on file     Attends [employer] service: Not on file     Active member of club or organization: Not on file     Attends meetings of clubs or organizations: Not on file     Relationship status: Not on file    Intimate partner violence     Fear of current or ex partner: Not on file     Emotionally abused: Not on file     Physically abused: Not on file     Forced sexual activity: Not on file   Other Topics Concern    Not on file   Social History Narrative    Not on file     Current Facility-Administered Medications   Medication Dose Route Frequency Provider Last Rate Last Dose    ondansetron (ZOFRAN) injection 4 mg  4 mg Intravenous Q30 Min PRN JAY Carter - CNP   4 mg at 20 1505     Current Outpatient Medications   Medication Sig Dispense Refill    Insulin Pen Needle 31G X 5 MM MISC 1 each by Does not apply route daily 100 each 3    oxyCODONE-acetaminophen (PERCOCET) 7.5-325 MG per tablet Take 1 tablet by mouth every 4 hours as needed for Pain.       hydrALAZINE (APRESOLINE) 50 MG tablet TAKE 1/2 TABLET BY MOUTH EVERY 8 HOURS 90 tablet 2    B Complex-C-Folic Acid (VIRT-CAPS) 1 MG CAPS TK ONE C PO  QD 90 capsule 1    Continuous Blood Gluc Sensor (FREESTYLE STEPHANY 14 DAY SENSOR) MISC 1 each by Does not apply route every 14 days 6 each 3    insulin glargine (BASAGLAR KWIKPEN) 100 UNIT/ML injection pen Inject 70 Units into the skin nightly 15 mL 5    dilTIAZem (CARDIZEM CD) 180 MG extended release capsule TAKE 1 CAPSULE BY MOUTH DAILY 30 capsule 10    traZODone (DESYREL) 150 MG tablet TAKE (1) TABLET BY MOUTH NIGHTLY 30 tablet 10    citalopram (CELEXA) 40 MG tablet TAKE (1) TABLET BY MOUTH DAILY 30 tablet 10    carvedilol (COREG) 12.5 MG tablet TAKE (1) TABLET BY MOUTH TWICE DAILY WITH MEALS 60 tablet 10    hydrOXYzine (VISTARIL) 50 MG capsule TAKE 1 TO 2 CAPSULES BY MOUTH NIGHTLY 180 capsule 10    insulin aspart (NOVOLOG FLEXPEN) 100 UNIT/ML injection pen Inject 20 Units into the skin 3 times daily (before meals) 15 pen 5    clopidogrel (PLAVIX) 75 MG tablet TAKE 1 TABLET BY MOUTH ONCE DAILY 90 tablet 3    pravastatin (PRAVACHOL) 80 MG tablet TAKE (1) TABLET BY MOUTH ONCE DAILY 90 tablet 3    QUEtiapine (SEROQUEL) 50 MG tablet TAKE 1 TABLET BY MOUTH EVERY EVENING 30 tablet 5    isosorbide mononitrate (IMDUR) 30 MG extended release tablet TAKE 1 TABLET BY MOUTH EVERY DAY 90 tablet 10    torsemide (DEMADEX) 100 MG tablet Take 1-2 tablets by mouth daily 180 tablet 3    ondansetron (ZOFRAN ODT) 4 MG disintegrating tablet Take 1 tablet by mouth every 8 hours as needed for Nausea 60 tablet 0    pregabalin (LYRICA) 25 MG capsule Take 1 capsule by mouth 3 times daily for 30 days.  90 capsule 3    LINZESS 145 MCG capsule TAKE 1 CAPSULE BY MOUTH EVERY MORNING BEFORE BREAKFAST 30 capsule 10    Calcium Acetate, Phos Binder, 667 MG CAPS TAKE 1 CAPSULE BY MOUTH THREE TIMES DAILY WITH MEALS 90 capsule 3    tiZANidine (ZANAFLEX) 4 MG tablet TAKE 1 TABLET BY MOUTH THREE TIMES DAILY 90 tablet 10    gabapentin (NEURONTIN) 100 MG capsule TAKE 1 TO 2 CAPSULES BY MOUTH THREE TIMES A DAY (Patient not taking: Reported on 11/10/2020) 540 capsule 10    fluconazole (DIFLUCAN) 150 MG tablet TAKE 1 TABLET BY MOUTH 1 TIME FOR 1 DOSE 1 tablet 0    DULoxetine (CYMBALTA) 30 MG extended release capsule TAKE 1 CAPSULE BY MOUTH EVERY DAY 90 capsule 10    nystatin-triamcinolone (MYCOLOG II) 769554-3.1 UNIT/GM-% cream Apply topically 2 times daily. 60 g 2    losartan (COZAAR) 50 MG tablet TAKE (1) TABLET BY MOUTH DAILY 90 tablet 10    pantoprazole (PROTONIX) 40 MG tablet TAKE (1) TABLET BY MOUTH EACH MORNING BEFORE BREAKFAST 90 tablet 10    albuterol (PROVENTIL) (2.5 MG/3ML) 0.083% nebulizer solution INHALE 1 VIAL VIA NEBULIZER EVERY 6 HOURS AS NEEDED FOR WHEEZING 300 mL 10    nystatin (MYCOSTATIN) 689016 UNIT/GM cream Apply topically 2 times daily. 60 g 3    Insulin Syringe-Needle U-100 30G X 1/2\" 0.5 ML MISC 1 each by Does not apply route daily 100 each 3    nitroGLYCERIN (NITROSTAT) 0.4 MG SL tablet DISSOLVE 1 TABLET UNDER THE TONGUE AS NEEDED FOR CHEST PAIN EVERY 5 MINUTES UP TO 3 TIMES. IF NO RELIEF CALL 911. 25 tablet 10    blood glucose test strips (FREESTYLE LITE) strip Daily As needed. (Patient not taking: Reported on 11/10/2020) 100 strip 3    glucose monitoring kit (FREESTYLE) monitoring kit 1 kit by Does not apply route daily (Patient not taking: Reported on 11/10/2020) 1 kit 0    vitamin D (ERGOCALCIFEROL) 79592 units CAPS capsule TK 1 C PO WEEKLY  11    flunisolide (NASALIDE) 25 MCG/ACT (0.025%) SOLN Inhale 2 sprays into the lungs every 12 hours 1 Bottle 5    Tiotropium Bromide-Olodaterol (STIOLTO RESPIMAT) 2.5-2.5 MCG/ACT AERS Inhale 2 puffs into the lungs daily 2 Inhaler 0    Polyethylene Glycol 3350 GRAN       Glucose Blood (BLOOD GLUCOSE TEST STRIPS) STRP TEST 3-4 TIMES DAILY, AS DIRECTED (Patient not taking: Reported on 11/10/2020) 100 strip 3    Blood Glucose Monitoring Suppl ADAM USE AS DIRECTED.  (Patient not taking: Reported on 11/10/2020) 1 Device 0    Alcohol Swabs PADS USE AS DIRECTED 300 each 3    albuterol sulfate  (90 Base) MCG/ACT inhaler Inhale 2 puffs into the lungs every 6 hours as needed for Wheezing 1 Inhaler 3    ipratropium-albuterol (DUONEB) 0.5-2.5 (3) MG/3ML SOLN nebulizer solution Inhale 3 mLs into the lungs every 6 hours as needed for Shortness of Breath 360 mL 1    Lancets MISC Test daily (Patient not taking: Reported on 11/10/2020) 100 each 3    calcium carbonate (TUMS) 500 MG chewable tablet Take 1 tablet by mouth 3 times daily as needed for Heartburn.  aspirin 81 MG chewable tablet Take 1 tablet by mouth daily. (Patient taking differently: Take 81 mg by mouth daily Indications: stopped on 6/25 for surgery ) 30 tablet 2     Allergies   Allergen Reactions    Morphine Nausea And Vomiting       REVIEW OF SYSTEMS    10 systems reviewed, pertinent positives per HPI otherwise noted to be negative      PHYSICAL EXAM  BP (!) 152/76   Pulse 70   Temp 97.7 °F (36.5 °C) (Oral)   Resp 18   Ht 5' 9\" (1.753 m)   Wt 260 lb (117.9 kg)   SpO2 97%   BMI 38.40 kg/m²   GENERAL APPEARANCE: Patient is well-developed, well-nourished. Awake and alert. Morbidly obese  HEENT: Normocephalic, atraumatic. Conjunctiva appear normal. Sclera is non-icteric. External ears are normal.  Mucous membranes moist.  EYES: EOM's grossly intact. NECK: Supple with normal ROM. Trachea midline   CARDIOVASCULAR:  Regular rate and rhythm. Brisk capillary refill. S1//2 is normal.  No murmurs, rubs, or gallops upon exam.  LUNGS: Equal symmetric chest rise. Breathing is unlabored. Speaking comfortably in full sentences. Breath sounds are clear throughout without wheezing, rales, rhonchi upon exam.  Abdomen: Soft, Nondistended, nontender to palpation. There is no pulsatile mass to palpation. No masses or hepatosplenomegaly. EXTREMITIES: To the upper extremities: Normal ROM, no edema, no tenderness, or deformity. Distal pulses palpable.   To the bilateral lower extremities there is significant weakness, patient cannot lift left or right leg up off the bed, can slightly flex the knee but this does take great effort, this is bilateral.  Cannot performed straight leg raise. Unable to adduct legs due to weakness. Dorsiflexion of ankles, feet, toes is decreased as well. Sensation limited to light touch to the lower extremities. BACK: On exam of lumbar spine, there is no swelling, bruising, or color change noted. There is no lumbar midline bony tenderness, without crepitus, deformity, or step off. Patient exhibits tenderness of paraspinal musculature to both sides of midline. There is no point tenderness over the SI Joint. SKIN: Warm and dry. Skin is intact. No rashes/lesions noted. NEUROLOGICAL: Alert and oriented. Normal strength (5/5 in all extremities) and sensation is intact. Patellar deep tendon reflexes are not obtainable bilaterally. PSYCHIATRIC: Mood and affect appropriate. Speech is clear and articulate.     LABS  Results for orders placed or performed during the hospital encounter of 12/04/20   CBC Auto Differential   Result Value Ref Range    WBC 12.0 (H) 4.0 - 11.0 K/uL    RBC 3.29 (L) 4.20 - 5.90 M/uL    Hemoglobin 9.7 (L) 13.5 - 17.5 g/dL    Hematocrit 29.2 (L) 40.5 - 52.5 %    MCV 88.7 80.0 - 100.0 fL    MCH 29.5 26.0 - 34.0 pg    MCHC 33.3 31.0 - 36.0 g/dL    RDW 13.5 12.4 - 15.4 %    Platelets 962 311 - 565 K/uL    MPV 7.7 5.0 - 10.5 fL    Neutrophils % 84.3 %    Lymphocytes % 7.9 %    Monocytes % 4.6 %    Eosinophils % 2.8 %    Basophils % 0.4 %    Neutrophils Absolute 10.1 (H) 1.7 - 7.7 K/uL    Lymphocytes Absolute 1.0 1.0 - 5.1 K/uL    Monocytes Absolute 0.6 0.0 - 1.3 K/uL    Eosinophils Absolute 0.3 0.0 - 0.6 K/uL    Basophils Absolute 0.1 0.0 - 0.2 K/uL   Comprehensive Metabolic Panel w/ Reflex to MG   Result Value Ref Range    Sodium 123 (L) 136 - 145 mmol/L    Potassium reflex Magnesium 5.2 (H) 3.5 - 5.1 mmol/L    Chloride 84 (L) 99 - 110 mmol/L    CO2 25 21 - 32 adrenal adenoma requires no additional evaluation or follow-up. ED COURSE/MDM  Patient seen and evaluated. Old records reviewed. Diagnostic testing reviewed and results discussed. I have seen and evaluated this patient with supervising physician: Donnel Severe, DO. We thoroughly discussed the history, physical exam, diagnostic testing, emergency department course, plan and disposition. Miah Rivera presented to the ED today with above noted complaints. Physical exam does reveal significant lower extremity weakness as patient is unable to move his legs across the bed without great effort and decreased sensation. This is new over the past week. There is no lumbar midline spine tenderness to palpation. There is tenderness noted to the paraspinal musculature to both sides of midline. The patient was reporting abdominal pain in addition to the above symptoms. In the ER he is afebrile and hemodynamically stable. He is well saturated on room air. There is a leukocytosis is WBC elevated at 12, absolute neutrophils elevated at 10.1. No further differential shift. Stable anemia. He does have a hyponatremia sodium is low at 123. BUN and creatinine are elevated at 90/6.9. This is expected as patient has not had dialysis for his last 2 treatments. Glucose elevated at 256 but there is no elevation of the anion gap. Potassium was elevated at 5.2 but specimen had hemolyzed. Repeat is normal at 4.5. No evidence of transaminitis. Sed rate elevated at 79. CRP is pending. CT abdomen pelvis was obtained and shows no evidence of acute intra-abdominal or intrapelvic process. No acute findings to suggest appendicitis, no ureter calculi or hydronephrosis. Probable reactive lymph nodes in the right upper quadrant that is unchanged from prior study. Mild extrahepatic bile duct dilatation status post cholecystectomy that is typical of reservoir effect. Calcific atherosclerotic disease aorta.   Small fat-containing umbilical hernia. Benign left adrenal adenoma that requires no additional evaluation or follow-up. MRI of the lumbar spine shows mild L1-2 and L5-S1 degenerative disc height loss, moderate right L5 neural foraminal narrowing secondary to a right foraminal L5-S1 disc protrusion. Mild L1-2 spinal canal stenosis. I do feel patient will require admission for further evaluation and management of his lower extremity weakness and decreased sensation. I did talk to the admitting hospitalist but she wanted to have the MRI results prior to admitting. She has been paged and alerted to MRI results showing no acute findings. I do feel patient would benefit from admission at this time. While in ED patient received   Medications   ondansetron (ZOFRAN) injection 4 mg (4 mg Intravenous Given 12/4/20 1505)   HYDROmorphone (DILAUDID) injection 1 mg (1 mg Intravenous Given 12/4/20 1505)   HYDROmorphone (DILAUDID) injection 0.5 mg (0.5 mg Intravenous Given 12/4/20 1841)     I spoke with Dr. Jamal Ng. We thoroughly discussed the history, physical exam, laboratory and imaging studies, as well as, emergency department course. Based upon that discussion, we've decided to admit Concetta Joseph for further observation and evaluation of Alyse Ann's lower extremity weakness. As I have deemed necessary from their history, physical, and studies, I have considered and evaluated Concetta Joseph for the following diagnoses: ABDOMINAL AORTIC ANEURYSM, CAUDA EQUINA SYNDROME, EPIDURAL MASS LESION, SPINAL STENOSIS, OR HERNIATED DISK CAUSING SEVERE STENOSIS      Cinical Impression    1. Bilateral leg weakness    2. Numbness and tingling of both legs    3. ESRD (end stage renal disease) on dialysis ([facility]      DISPOSITION  To be admitted.     Comment: Please note this report has been produced using speech recognition software and may contain errors related to that system including errors in grammar, punctuation, and spelling, as well as words and phrases that may be inappropriate. If there are any questions or concerns please feel free to contact the dictating provider for clarification.        JAY Marr - ILAN  12/04/20 [id number]

## 2020-12-04 NOTE — ED PROVIDER NOTES
Patient was seen and evaluated by myself in conjunction with nurse practitioner. History and physical exam were independently performed on Tatianna Moreno. All diagnostic, treatment, and disposition decisions were made by myself in conjunction with nurse practitioner. Please see note completed by Sabino Goodrich NP for full details of patient's visit. Patient presents to the emergency department complaining of frequent falls and leg pain. Patient reports that over the last several weeks he has noted increasing weakness of bilateral lower extremities stating that he is having a hard time ambulating. Patient reports chronic low back pain that is essentially unchanged and rated as 10/10. Patient denies fevers, chills, or sweats. No cough, congestion. Patient denies urinary incontinence. Physical exam: General: No acute distress. Head: Normocephalic/atraumatic. Heart: Regular rate rhythm. Normal S1-S2. Lungs: Clear to auscultation bilaterally. No wheezes, rales, rhonchi. Abdomen: Soft. Nontender nondistended. Spine: Diffuse lower abdominal tenderness to palpation. No step-offs, crepitus. No overlying contusions. Neurologic: Muscle strength 4/5 in bilateral lower extremities. Upper extremities 5/5. No saddle anesthesia or urinary incontinence. No pronator drift. Assessment/plan:   1. Bilateral leg weakness    2. Numbness and tingling of both legs    3.  ESRD (end stage renal disease) on dialysis [facility])           Amrita Guy, DO  12/04/20 [id number] Gerardo Win, DO  12/04/20 2371

## 2020-12-04 NOTE — ED NOTES
Patient identified as a positive fall risk on the ED triage fall screening. Patient placed in fall precautions which includes:  yellow fall risk bracelet on wrist, yellow socks at bedside, \"Be Safe\" sign placed on patient's door, and bed alarm placed under patient/alarm turned on. Patient instructed on importance of not getting out of bed or ambulating without assistance for safety.           Óscar Larson RN  12/04/20 Nick Collier RN  12/04/20 [id number]

## 2020-12-04 NOTE — ED NOTES
Bed: 09  Expected date:   Expected time:   Means of arrival:   Comments:  [address], [address] [place] [address]  12/04/20 1929

## 2020-12-04 NOTE — ED NOTES
[address] [facility] lower extremity weakness, ESRD  [address] Kumari called back     Evi Velazquez  12/04/20 4046

## 2020-12-04 NOTE — ED NOTES
Fall risk screening completed. Fall risk bracelet applied to patient. Non-skid socks provided and placed on patient. The fall risk is indicated using  dome light . Based on score, a bed alarm was indicated and applied. The call light is within the patient's reach, and instructions for use were provided. The bed is in the lowest position with wheels locked. The patient has been advised to notify staff, using the call light, if there is a need to get up or use restroom. The patient verbalized understanding of safety precautions and how to contact staff for assistance.        Sang Nolasco RN  12/04/20 4735

## 2020-12-05 ENCOUNTER — APPOINTMENT (OUTPATIENT)
Dept: MRI IMAGING | Age: 50
DRG: 555 | End: 2020-12-05
Payer: COMMERCIAL

## 2020-12-05 LAB
ANION GAP SERPL CALCULATED.3IONS-SCNC: 15 MMOL/L (ref 3–16)
BASOPHILS ABSOLUTE: 0 K/UL (ref 0–0.2)
BASOPHILS RELATIVE PERCENT: 0.5 %
BUN BLDV-MCNC: 96 MG/DL (ref 7–20)
CALCIUM SERPL-MCNC: 8.8 MG/DL (ref 8.3–10.6)
CHLORIDE BLD-SCNC: 89 MMOL/L (ref 99–110)
CO2: 25 MMOL/L (ref 21–32)
CREAT SERPL-MCNC: 7.6 MG/DL (ref 0.9–1.3)
EOSINOPHILS ABSOLUTE: 0.4 K/UL (ref 0–0.6)
EOSINOPHILS RELATIVE PERCENT: 3.6 %
GFR AFRICAN AMERICAN: 9
GFR NON-AFRICAN AMERICAN: 8
GLUCOSE BLD-MCNC: 168 MG/DL (ref 70–99)
GLUCOSE BLD-MCNC: 178 MG/DL (ref 70–99)
GLUCOSE BLD-MCNC: 237 MG/DL (ref 70–99)
GLUCOSE BLD-MCNC: 309 MG/DL (ref 70–99)
HCT VFR BLD CALC: 29.4 % (ref 40.5–52.5)
HEMOGLOBIN: 10 G/DL (ref 13.5–17.5)
LYMPHOCYTES ABSOLUTE: 1.2 K/UL (ref 1–5.1)
LYMPHOCYTES RELATIVE PERCENT: 11.4 %
MCH RBC QN AUTO: 29.8 PG (ref 26–34)
MCHC RBC AUTO-ENTMCNC: 33.9 G/DL (ref 31–36)
MCV RBC AUTO: 87.9 FL (ref 80–100)
MONOCYTES ABSOLUTE: 0.6 K/UL (ref 0–1.3)
MONOCYTES RELATIVE PERCENT: 6.3 %
NEUTROPHILS ABSOLUTE: 7.9 K/UL (ref 1.7–7.7)
NEUTROPHILS RELATIVE PERCENT: 78.2 %
PDW BLD-RTO: 13.2 % (ref 12.4–15.4)
PERFORMED ON: ABNORMAL
PLATELET # BLD: 250 K/UL (ref 135–450)
PMV BLD AUTO: 7.9 FL (ref 5–10.5)
POTASSIUM REFLEX MAGNESIUM: 3.8 MMOL/L (ref 3.5–5.1)
RBC # BLD: 3.35 M/UL (ref 4.2–5.9)
SODIUM BLD-SCNC: 129 MMOL/L (ref 136–145)
WBC # BLD: 10.1 K/UL (ref 4–11)

## 2020-12-05 PROCEDURE — 94660 CPAP INITIATION&MGMT: CPT

## 2020-12-05 PROCEDURE — 83036 HEMOGLOBIN GLYCOSYLATED A1C: CPT

## 2020-12-05 PROCEDURE — 6370000000 HC RX 637 (ALT 250 FOR IP): Performed by: INTERNAL MEDICINE

## 2020-12-05 PROCEDURE — 1200000000 HC SEMI PRIVATE

## 2020-12-05 PROCEDURE — 94640 AIRWAY INHALATION TREATMENT: CPT

## 2020-12-05 PROCEDURE — 36415 COLL VENOUS BLD VENIPUNCTURE: CPT

## 2020-12-05 PROCEDURE — 5A1D70Z PERFORMANCE OF URINARY FILTRATION, INTERMITTENT, LESS THAN 6 HOURS PER DAY: ICD-10-PCS | Performed by: INTERNAL MEDICINE

## 2020-12-05 PROCEDURE — 90935 HEMODIALYSIS ONE EVALUATION: CPT

## 2020-12-05 PROCEDURE — 99221 1ST HOSP IP/OBS SF/LOW 40: CPT | Performed by: PHYSICIAN ASSISTANT

## 2020-12-05 PROCEDURE — 72146 MRI CHEST SPINE W/O DYE: CPT

## 2020-12-05 PROCEDURE — 80048 BASIC METABOLIC PNL TOTAL CA: CPT

## 2020-12-05 PROCEDURE — 87340 HEPATITIS B SURFACE AG IA: CPT

## 2020-12-05 PROCEDURE — 6370000000 HC RX 637 (ALT 250 FOR IP): Performed by: NURSE PRACTITIONER

## 2020-12-05 PROCEDURE — 72141 MRI NECK SPINE W/O DYE: CPT

## 2020-12-05 PROCEDURE — 99223 1ST HOSP IP/OBS HIGH 75: CPT | Performed by: PSYCHIATRY & NEUROLOGY

## 2020-12-05 PROCEDURE — 2580000003 HC RX 258: Performed by: INTERNAL MEDICINE

## 2020-12-05 PROCEDURE — 86706 HEP B SURFACE ANTIBODY: CPT

## 2020-12-05 PROCEDURE — 87350 HEPATITIS BE AG IA: CPT

## 2020-12-05 PROCEDURE — 85025 COMPLETE CBC W/AUTO DIFF WBC: CPT

## 2020-12-05 PROCEDURE — 6360000002 HC RX W HCPCS: Performed by: INTERNAL MEDICINE

## 2020-12-05 RX ORDER — HEPARIN SODIUM 1000 [USP'U]/ML
4000 INJECTION, SOLUTION INTRAVENOUS; SUBCUTANEOUS PRN
Status: DISCONTINUED | OUTPATIENT
Start: 2020-12-05 | End: 2020-12-21 | Stop reason: HOSPADM

## 2020-12-05 RX ORDER — ALBUTEROL SULFATE 90 UG/1
2 AEROSOL, METERED RESPIRATORY (INHALATION) EVERY 6 HOURS PRN
Status: DISCONTINUED | OUTPATIENT
Start: 2020-12-05 | End: 2020-12-21 | Stop reason: HOSPADM

## 2020-12-05 RX ORDER — LIDOCAINE 4 G/G
1 PATCH TOPICAL DAILY
Status: DISCONTINUED | OUTPATIENT
Start: 2020-12-05 | End: 2020-12-21 | Stop reason: HOSPADM

## 2020-12-05 RX ADMIN — HYDRALAZINE HYDROCHLORIDE 25 MG: 25 TABLET, FILM COATED ORAL at 06:29

## 2020-12-05 RX ADMIN — INSULIN GLARGINE 70 UNITS: 100 INJECTION, SOLUTION SUBCUTANEOUS at 20:14

## 2020-12-05 RX ADMIN — PANTOPRAZOLE SODIUM 40 MG: 40 TABLET, DELAYED RELEASE ORAL at 06:29

## 2020-12-05 RX ADMIN — INSULIN LISPRO 2 UNITS: 100 INJECTION, SOLUTION INTRAVENOUS; SUBCUTANEOUS at 20:15

## 2020-12-05 RX ADMIN — TORSEMIDE 100 MG: 100 TABLET ORAL at 12:43

## 2020-12-05 RX ADMIN — PREGABALIN 25 MG: 25 CAPSULE ORAL at 15:36

## 2020-12-05 RX ADMIN — TIZANIDINE 2 MG: 4 TABLET ORAL at 15:36

## 2020-12-05 RX ADMIN — TIZANIDINE 2 MG: 4 TABLET ORAL at 20:13

## 2020-12-05 RX ADMIN — INSULIN LISPRO 2 UNITS: 100 INJECTION, SOLUTION INTRAVENOUS; SUBCUTANEOUS at 18:06

## 2020-12-05 RX ADMIN — TRAZODONE HYDROCHLORIDE 150 MG: 50 TABLET ORAL at 20:13

## 2020-12-05 RX ADMIN — HYDRALAZINE HYDROCHLORIDE 25 MG: 25 TABLET, FILM COATED ORAL at 15:34

## 2020-12-05 RX ADMIN — DULOXETINE HYDROCHLORIDE 30 MG: 30 CAPSULE, DELAYED RELEASE ORAL at 12:43

## 2020-12-05 RX ADMIN — DILTIAZEM HYDROCHLORIDE 180 MG: 180 CAPSULE, COATED, EXTENDED RELEASE ORAL at 15:33

## 2020-12-05 RX ADMIN — CLOPIDOGREL BISULFATE 75 MG: 75 TABLET ORAL at 12:43

## 2020-12-05 RX ADMIN — PRAVASTATIN SODIUM 80 MG: 80 TABLET ORAL at 20:13

## 2020-12-05 RX ADMIN — OXYCODONE HYDROCHLORIDE AND ACETAMINOPHEN 1 TABLET: 7.5; 325 TABLET ORAL at 18:34

## 2020-12-05 RX ADMIN — LOSARTAN POTASSIUM 50 MG: 25 TABLET, FILM COATED ORAL at 12:43

## 2020-12-05 RX ADMIN — PREGABALIN 25 MG: 25 CAPSULE ORAL at 20:13

## 2020-12-05 RX ADMIN — SODIUM CHLORIDE, PRESERVATIVE FREE 10 ML: 5 INJECTION INTRAVENOUS at 20:14

## 2020-12-05 RX ADMIN — OXYCODONE HYDROCHLORIDE AND ACETAMINOPHEN 1 TABLET: 7.5; 325 TABLET ORAL at 14:23

## 2020-12-05 RX ADMIN — OXYCODONE HYDROCHLORIDE AND ACETAMINOPHEN 1 TABLET: 7.5; 325 TABLET ORAL at 06:36

## 2020-12-05 RX ADMIN — GLYCOPYRROLATE AND FORMOTEROL FUMARATE 2 PUFF: 9; 4.8 AEROSOL, METERED RESPIRATORY (INHALATION) at 08:26

## 2020-12-05 RX ADMIN — HEPARIN SODIUM 5000 UNITS: 5000 INJECTION INTRAVENOUS; SUBCUTANEOUS at 20:14

## 2020-12-05 RX ADMIN — ISOSORBIDE MONONITRATE 30 MG: 30 TABLET, EXTENDED RELEASE ORAL at 12:43

## 2020-12-05 RX ADMIN — HEPARIN SODIUM 5000 UNITS: 5000 INJECTION INTRAVENOUS; SUBCUTANEOUS at 06:29

## 2020-12-05 RX ADMIN — OXYCODONE HYDROCHLORIDE AND ACETAMINOPHEN 1 TABLET: 7.5; 325 TABLET ORAL at 22:21

## 2020-12-05 RX ADMIN — GLYCOPYRROLATE AND FORMOTEROL FUMARATE 2 PUFF: 9; 4.8 AEROSOL, METERED RESPIRATORY (INHALATION) at 19:39

## 2020-12-05 RX ADMIN — CARVEDILOL 12.5 MG: 6.25 TABLET, FILM COATED ORAL at 18:06

## 2020-12-05 RX ADMIN — GABAPENTIN 100 MG: 100 CAPSULE ORAL at 20:13

## 2020-12-05 RX ADMIN — GABAPENTIN 100 MG: 100 CAPSULE ORAL at 14:23

## 2020-12-05 RX ADMIN — QUETIAPINE FUMARATE 50 MG: 25 TABLET ORAL at 18:06

## 2020-12-05 ASSESSMENT — PAIN SCALES - GENERAL
PAINLEVEL_OUTOF10: 8
PAINLEVEL_OUTOF10: 10
PAINLEVEL_OUTOF10: 8
PAINLEVEL_OUTOF10: 10
PAINLEVEL_OUTOF10: 0
PAINLEVEL_OUTOF10: 8
PAINLEVEL_OUTOF10: 9
PAINLEVEL_OUTOF10: 8

## 2020-12-05 ASSESSMENT — ENCOUNTER SYMPTOMS
GASTROINTESTINAL NEGATIVE: 1
RESPIRATORY NEGATIVE: 1

## 2020-12-05 NOTE — CONSULTS
[facility] Orthopedic Surgery  Consult Note        Reason for Consult:  Lower extremity numbness and weakness    CHIEF COMPLAINT:  Lower extremity numbness and weakness    History Obtained From:  patient, electronic medical record    HISTORY OF PRESENT ILLNESS:    The patient is a 44 y.o. male with a PMH significant for CVA, sleep apnea, hx blood clots, MI, GERD, ESRD, COPD, diabetes with neuropathy, CHF and CAD, who presents with acute lower extremity numbness and weakness. Patient was seen and examined while receiving dialysis. He reports chronic neuropathy in his feet, but feels he developed increased numbness throughout entire legs. His symptoms began about 1 week ago and has gradually progressed since then. He notes significant difficulty with ambulation over the past 3 days. He states he can stand but cannot feel his legs while standing, then falls when he goes to take a step. He notes new tingling in his hands bilaterally. He denies other upper extremity pain or numbness. He notes chronic weakness of his left arm and leg since his previous CVA. He denies saddle numbness or bowel or bladder dysfunction. He feels he may have intermittent rectal numbness when wiping after a BM. He denies penile numbness. He notes low back pain that is severe with intermittent right greater than left buttock pain. He denies neck or thoracic back pain. He frequently asks for dilaudid for pain control upon my assessment.      Past Medical History:        Diagnosis Date    Ambulatory dysfunction     walker for long distances, SOB with distance    Aortic stenosis     echo 2017    Arthritis     hands and hips    Asthma     Bilateral hilar adenopathy syndrome 6/3/2013    CAD (coronary artery disease)     Dr. Nya Garcia [facility]) 04/19/2019    EF= 43%    CH[facility]congestive heart failure) ([facility])     Chronic pain     COPD (chronic obstructive pulmonary disease) [facility])     pulmonology Dr. Parkinson Malathi TID  QUEtiapine (SEROQUEL) tablet 50 mg, 50 mg, Oral, QPM  glycopyrrolate-formoterol (BEVESPI) 9-4.8 MCG/ACT inhaler 2 puff, 2 puff, Inhalation, BID  tiZANidine (ZANAFLEX) tablet 2 mg, 2 mg, Oral, TID  torsemide (DEMADEX) tablet 100 mg, 100 mg, Oral, Daily  traZODone (DESYREL) tablet 150 mg, 150 mg, Oral, Nightly  sodium chloride flush 0.9 % injection 10 mL, 10 mL, Intravenous, 2 times per day  sodium chloride flush 0.9 % injection 10 mL, 10 mL, Intravenous, PRN  acetaminophen (TYLENOL) tablet 650 mg, 650 mg, Oral, Q6H PRN **OR** acetaminophen (TYLENOL) suppository 650 mg, 650 mg, Rectal, Q6H PRN  polyethylene glycol (GLYCOLAX) packet 17 g, 17 g, Oral, Daily PRN  promethazine (PHENERGAN) tablet 12.5 mg, 12.5 mg, Oral, Q6H PRN **OR** ondansetron (ZOFRAN) injection 4 mg, 4 mg, Intravenous, Q6H PRN  heparin (porcine) injection 5,000 Units, 5,000 Units, Subcutaneous, 3 times per day  insulin lispro (HUMALOG) injection vial 0-6 Units, 0-6 Units, Subcutaneous, TID WC  insulin lispro (HUMALOG) injection vial 0-3 Units, 0-3 Units, Subcutaneous, Nightly    Allergies:  Morphine    REVIEW OF SYSTEMS:    CONSTITUTIONAL:  negative for  fevers, chills and sweats  MUSCULOSKELETAL:  positive for  pain and decreased range of motion    All other systems reviewed and are negative. PHYSICAL EXAM:    VITALS:  BP (!) 150/80   Pulse 88   Temp 98.1 °F (36.7 °C)   Resp 18   Ht 5' 9\" (1.753 m)   Wt 272 lb 4.3 oz (123.5 kg)   SpO2 96%   BMI 40.21 kg/m²     PULMONARY: Respirations deep and easy  SKIN: warm and dry  MUSCULOSKELETAL:    CERVICAL EXAM: Gait was not assessed. Patient was examined while lying in bed receiving dialysis. His has no tenderness over his cervical spine and no obvious muscle spasm. The skin over his cervical spine is normal. He has 4/5 left deltoid and shoulder abduction, otherwise bilateral upper extremities with 5/5 motor strength throughout.   Sensation is intact to light touchfrom C6 to C8 with exception of mild tingling in his fingers. He has no clonus and negative Whittington's bilaterally. LUMBAR EXAM. He has mild to moderate tenderness over his lumbar spine without obvious muscle spasm, no tenderness with palpation to thoracic spine. His lower extremity exam is weak but inconsistent. Right lower extremity foot DF was 2/5 at times 3/5, 4/5 plantar flexion, 3/5 hip flexion, 4/5 knee flexion and extension. Left lower extremity foot dorsiflexion 3/5 and plantar flexion 4/5, 3-4/5 left hip flexion, knee flexion and extension. Bilateral hip range of motion resulted in low back and buttock pain, but minimal pain into hips. Achilles and quadriceps reflexes are 1+. Absent sensation to light touch in right leg below the knee. NEUROLOGIC:   Sensory:    Touch:                     Right Upper Extremity:  normal                   Left Upper Extremity:  normal                  Right Lower Extremity:  abnormal - absent sensation to light touch in right shin, calf, and foot throughout                  Left Lower Extremity:  abnormal - sensation intact to light touch throughout, but noted to be mildly decreased in left foot. DATA:    CBC:   Lab Results   Component Value Date    WBC 10.1 12/05/2020    RBC 3.35 12/05/2020    HGB 10.0 12/05/2020    HCT 29.4 12/05/2020    MCV 87.9 12/05/2020    MCH 29.8 12/05/2020    MCHC 33.9 12/05/2020    RDW 13.2 12/05/2020     12/05/2020    MPV 7.9 12/05/2020     WBC:    Lab Results   Component Value Date    WBC 10.1 12/05/2020     PT/INR:    Lab Results   Component Value Date    PROTIME 10.6 04/25/2020    INR 0.91 04/25/2020     PTT:    Lab Results   Component Value Date    APTT 27.5 07/02/2019   [APTT    Sed rate:79    CRP:34    Radiology Review:    I reviewed MRI images of his lumbar spine from 12/4/20:  Impression    1. Mild L1-2 and L5-S1 degenerative disc height loss. 2. Moderate right L5 neural foraminal narrowing secondary to a right    foraminal L5-S1 disc protrusion.     3. Mild L1-2 spinal canal stenosis. IMPRESSION/RECOMMENDATIONS:  MRI lumbar spine does not explain lower extremity weakness and numbness. With his elevated ESR and CRP, recommend cervical and thoracic MRI to be completed today for possible epidural abscess. Discussed with Dr. Erlinda Stanley.      Marine Blair  12/5/2020  10:40 AM

## 2020-12-05 NOTE — PROGRESS NOTES
12/05/20 @ 6587 -Nephrology consult called in and sent out to Dr. Tracie Zhao at this time. RNs number given for callback if needed.     Josemanuel Garcia, PCA

## 2020-12-05 NOTE — CONSULTS
In patient Neurology consult        [facility]      Radhika Mcintyre MD      [employer]  1970    Date of Service: 12/5/2020    Referring Physician: Rosana Rosenberg MD      Reason for the consult and CC: Acute leg weakness, numbness and recurrent falling. HPI:   The patient is a 44y.o.  years old male with multiple medical problem including history of diabetes with polyneuropathy, chronic back pain, end-stage renal disease on hemodialysis and other medical issues admitted to the hospital yesterday with acute lower leg weakness, paresthesia and recurrent falling. Symptoms started 3 to 5 days ago. She describes sudden onset of numbness and tingling affecting his legs from his knees down to his feet bilaterally. Symptoms are waxing and waning but persistent. Other associated symptoms including progressive weakness in his legs to the point that he fell several times at home. No similar symptoms in his arms or hands. No recent fever or chills. No significant head trauma or worsening of his back or neck pain. Patient's symptoms progressed to the point that he was unable to function at home. He decided to come to the ER where he was eventually admitted. Initial work-up in the ED revealed hyponatremia sodium 139 potassium 3.8. Creatinine 7.6. White count is 10. Patient had a lumbar MRI yesterday which showed mild to moderate disc protrusion and DJD. The patient today denies any new symptoms. No headache, dysphagia or dysarthria or breathing difficulties. The same chronic back pain. No bladder or bowel issues. Other review of system was unremarkable.       Family History   Problem Relation Age of Onset    Diabetes Mother     Heart Disease Father     Kidney Disease Sister         stage 4-kidney failure    Cancer Sister     Heart Disease Sister     Obesity Sister     Cancer Sister     Heart Disease Sister     Obesity Sister     Alcohol Abuse Brother      Past Surgical History:   Procedure Laterality Date    AORTIC VALVE REPLACEMENT N/A 10/15/2019    TRANSCATHETER AORTIC VALVE REPLACEMENT FEMORAL APPROACH performed by Radha Baca MD at [address] Right 7/2/2019    PERITONEAL DIALYSIS CATHETER REMOVAL performed by Viola Riley MD at [facility] COLONOSCOPY  2/29/2015    WN    CORONARY ANGIOPLASTY WITH STENT PLACEMENT  05/26/15    CYST REMOVAL  08/14/2013    EXCISION CYSTS, NECK X2 AND ABDOMINAL benign    DIAGNOSTIC CARDIAC CATH LAB PROCEDURE      DIALYSIS FISTULA CREATION Left 10/30/2017    LEFT BRACHIAL CEPHALIC FISTULA    DIALYSIS FISTULA CREATION Left 3/27/2019    LIGATION  AV FISTULA performed by Tiago Weston MD at [address] [place] [address], COLON, DIAGNOSTIC      OTHER SURGICAL HISTORY  02/01/2017    laparoscopic cholecystectomy with intraoperative cholangiogram    OTHER SURGICAL HISTORY  2018    PORT PLACEMENT  - vas cath    OTHER SURGICAL HISTORY Bilateral 06/26/2018    laprascopic peritoneal dialysis catheter placement    OTHER SURGICAL HISTORY Right 09/2018    peritoneal dialysis port placed on right side of abdomen    OTHER SURGICAL HISTORY  05/28/2019    PTA/Stenting R External Iliac artery    [place] LAP INSERTION TUNNELED INTRAPERITONEAL CATHETER N/A 9/21/2018    LAPAROSCOPIC PERITONEAL DIALYSIS CATHETER REPLACEMENT performed by Viola Riley MD at [id number] [place] [address]  01/06/2016    UPPER GASTROINTESTINAL ENDOSCOPY  01/29/2017    possible candida, otherwise normal appearing    VASCULAR SURGERY  aprx 2 years ago    2 stents placed, each side of groin        Past Medical History:   Diagnosis Date    Ambulatory dysfunction     walker for long distances, SOB with distance    Aortic stenosis     echo 2017    Arthritis     hands and hips    Asthma     Bilateral hilar adenopathy syndrome 6/3/2013    CAD (coronary artery disease)     Dr. Mariama Padilla Heart    Cardiomyopathy ([facility] 2019    EF= 43%    [facility]est[facility])     Chronic pain     COPD (chronic obstructive pulmonary disease) ([facility])     pulmonology Dr. Aguero Peer    Depression     Diabetes mellitus ([facility]     borderline    Difficult intravenous access     Emphysema of lung ([facility]     ESRD (end stage renal disease) on dialysis ([facility]     MWF    Fear of needles     Gastric ulcer     GERD (gastroesophageal reflux disease)     Heart valve problem     bicuspic valve    Hemodialysis patient ([facility]     History of spinal fracture     work incident    Hx of blood clots     Bilateral lower extremities; stents in place    Hyperlipidemia     Hypertension     MI (myocardial infarction) ([facility] 2019    has had 9 MIs. 2019 was the last    Neuromuscular disorder ([facility]     due to CVA    Numbness and tingling in left arm     from fistula    Pneumonia     PONV (postoperative nausea and vomiting)     Prolonged emergence from general anesthesia     States requires more medication than most people    Sleep apnea     Uses CPAP    Stroke ([facility]     7mm thalamic cva 2017 deficts left side, left side weakness    TIA (transient ischemic attack)     Unspecified diseases of blood and blood-forming organs      Social History     Tobacco Use    Smoking status: Current Every Day Smoker     Packs/day: 1.00     Years: 33.00     Pack years: 33.00     Types: Cigarettes     Last attempt to quit: 2020     Years since quittin.6    Smokeless tobacco: Never Used    Tobacco comment: TRYING TO QUIT 3-7 a day   Substance Use Topics    Alcohol use: Not Currently     Alcohol/week: 0.0 standard drinks     Comment: occ    Drug use: No     Allergies   Allergen Reactions    Morphine Nausea And Vomiting     Current Facility-Administered Medications   Medication Dose Route Frequency Provider Last Rate Last Dose    albuterol sulfate  (90 Base) MCG/ACT inhaler 2 puff  2 puff Inhalation Q6H PRN Jake Aguayo MD        heparin (porcine) injection 4,000 Units  4,000 Units Intracatheter PRN Barb Calles MD        aspirin chewable tablet 81 mg  81 mg Oral Daily Jake Aguayo MD   Stopped at 12/05/20 1243    carvedilol (COREG) tablet 12.5 mg  12.5 mg Oral BID WC Jake Aguayo MD   Stopped at 12/05/20 1243    clopidogrel (PLAVIX) tablet 75 mg  75 mg Oral Daily Jake Aguayo MD   Stopped at 12/05/20 1243    dilTIAZem (CARDIZEM CD) extended release capsule 180 mg  180 mg Oral Daily Jake Aguayo MD   Stopped at 12/05/20 1243    DULoxetine (CYMBALTA) extended release capsule 30 mg  30 mg Oral Daily Jake Aguayo MD   Stopped at 12/05/20 1243    gabapentin (NEURONTIN) capsule 100 mg  100 mg Oral TID Jake Aguayo MD   Stopped at 12/05/20 1243    hydrALAZINE (APRESOLINE) tablet 25 mg  25 mg Oral 3 times per day Jake Aguayo MD   25 mg at 12/05/20 0629    insulin glargine (LANTUS) injection vial 70 Units  70 Units Subcutaneous Nightly Jake Aguayo MD   70 Units at 12/04/20 2235    isosorbide mononitrate (IMDUR) extended release tablet 30 mg  30 mg Oral Daily Jake Aguayo MD   Stopped at 12/05/20 1243    linaclotide (LINZESS) capsule 145 mcg  145 mcg Oral QAM AC Jake Aguayo MD        losartan (COZAAR) tablet 50 mg  50 mg Oral Daily Jake Aguayo MD   Stopped at 12/05/20 1243    oxyCODONE-acetaminophen (PERCOCET) 7.5-325 MG per tablet 1 tablet  1 tablet Oral Q4H PRN Jake Aguayo MD   1 tablet at 12/05/20 0636    pantoprazole (PROTONIX) tablet 40 mg  40 mg Oral QAM AC Jake Aguayo MD   40 mg at 12/05/20 0629    pravastatin (PRAVACHOL) tablet 80 mg  80 mg Oral Nightly Jake Aguayo MD   80 mg at 12/04/20 2235    pregabalin (LYRICA) capsule 25 mg  25 mg Oral TID Jake Aguayo MD   Stopped at 12/05/20 1243    QUEtiapine (SEROQUEL) tablet 50 mg  50 mg Oral QPM Jake Aguayo MD   50 mg at 12/04/20 6384    glycopyrrolate-formoterol (BEVESPI) 9-4.8 MCG/ACT inhaler 2 puff  2 puff Inhalation BID Roxanne Saleh MD   2 puff at 12/05/20 0826    tiZANidine (ZANAFLEX) tablet 2 mg  2 mg Oral TID Roxanne Saleh MD   Stopped at 12/05/20 1243    torsemide (DEMADEX) tablet 100 mg  100 mg Oral Daily Roxanne Saleh MD   Stopped at 12/05/20 1243    traZODone (DESYREL) tablet 150 mg  150 mg Oral Nightly Roxanne Saleh MD   150 mg at 12/04/20 2234    sodium chloride flush 0.9 % injection 10 mL  10 mL Intravenous 2 times per day Roxanne Saleh MD   Stopped at 12/05/20 1243    sodium chloride flush 0.9 % injection 10 mL  10 mL Intravenous PRN Roxanne Saleh MD        acetaminophen (TYLENOL) tablet 650 mg  650 mg Oral Q6H PRN Roxanne Saleh MD        Or   [facility] acetaminophen (TYLENOL) suppository 650 mg  650 mg Rectal Q6H PRN Roxanne Saleh MD        polyethylene glycol (GLYCOLAX) packet 17 g  17 g Oral Daily PRN Roxanne Saleh MD        promethazine (PHENERGAN) tablet 12.5 mg  12.5 mg Oral Q6H PRN Roxanne Saleh MD        Or    ondansetron (ZOFRAN) injection 4 mg  4 mg Intravenous Q6H PRN Roxanne Saleh MD        heparin (porcine) injection 5,000 Units  5,000 Units Subcutaneous 3 times per day Roxanne Saleh MD   5,000 Units at 12/05/20 0629    insulin lispro (HUMALOG) injection vial 0-6 Units  0-6 Units Subcutaneous TID WC Roxanne Saleh MD   Stopped at 12/05/20 1243    insulin lispro (HUMALOG) injection vial 0-3 Units  0-3 Units Subcutaneous Nightly Roxanne Saleh MD   2 Units at 12/04/20 2236       ROS : A 10-14 system review of constitutional, cardiovascular, respiratory, eyes, musculoskeletal, endocrine, GI, ENT, skin, hematological, genitourinary, psychiatric and neurologic systems was obtained and updated today and is unremarkable except as mentioned in my HPI      Exam:     Constitutional:   Vitals:    12/05/20 0337 12/05/20 0831 12/05/20 0909 12/05/20 0945   BP:   138/61 (!) 150/80   Pulse:   90 88 12/05/2020     Lab Results   Component Value Date    WBC 10.1 12/05/2020    RBC 3.35 12/05/2020    HGB 10.0 12/05/2020    HCT 29.4 12/05/2020    MCV 87.9 12/05/2020    RDW 13.2 12/05/2020     12/05/2020     Lab Results   Component Value Date    INR 0.91 04/25/2020    PROTIME 10.6 04/25/2020       Neuroimaging were independently reviewed by myself and discussed results with the patient  Reviewed notes from different physicians  Reviewed lab and blood testing    Impression:  Acute lower extremity weakness, paresthesia and recurrent falling. Severe. So far no specific etiology. Could be combination of chronic diabetic polyneuropathy, lumbar DJD and radiculopathy in addition to multiple metabolic derangement. It is hard based on examination to differentiate if the patient has acute postinflammatory demyelinating polyneuropathy superimposed on his chronic findings. If he is not better by Monday, would recommend LP under fluoroscopy to rule out such possibility. It is always difficult to make such a diagnosis especially in patient with diabetic polyneuropathy. Diabetes, poorly controlled with underlying polyneuropathy. Recent A1c 14. Chronic back pain secondary lumbar DJD  Hyponatremia  End-stage renal disease  Obesity  Hyperlipidemia  CAD    Recommendation:     Continue current supportive care  Continue scheduled hemodialysis  Gentle hydration follow sodium level and CMP  PT and OT  DVT and GI prophylaxis  Insulin sliding scale  Blood sugar control and monitor  Continue current blood pressure medications  Neurochecks  Blood pressure monitor  He is on aspirin and Plavix  Statin  Continue home SSRI  Continue to go for diabetic polyneuropathy  If symptoms are not better by Monday, would recommend LP under IR  Falling precaution  MDM: High      Thank you for referring such patient. If you have any questions regarding my consult note, please don't hesitate to call me.      Dionna Carlin MD  [phone]    This dictation was generated by voice recognition computer software.  Although all attempts are made to edit the dictation for accuracy, there may be errors in the  transcription that are not intended

## 2020-12-05 NOTE — PROGRESS NOTES
12/04/20 8160   NIV Type   Skin Assessment Clean, dry, & intact   Skin Protection for O2 Device No  (pt refused)   NIV Started/Stopped On   Equipment Type v60   Mode CPAP   Mask Type Full face mask   Mask Size Medium   Settings/Measurements   CPAP/EPAP 10 cmH2O   Resp 14   FiO2  21 %   Vt Exhaled 720 mL   Minute Volume 10.2 Liters   Mask Leak (lpm) 41 lpm   Comfort Level Good   Using Accessory Muscles No   SpO2 96   Alarm Settings   Alarms On Y   Press Low Alarm 6 cmH2O   High Pressure Alarm 30 cmH2O   Delay Alarm 20 sec(s)   Resp Rate Low Alarm 6   High Respiratory Rate 40 br/min

## 2020-12-05 NOTE — PROGRESS NOTES
RESPIRATORY THERAPY ASSESSMENT    Name:  Juan Ramon Brown Record Number:  [id number]  Age: 44 y.o. Gender: male  : 1970  Today's Date:  2020  Room:  0536/0536-01    Assessment     Is the patient being admitted for a COPD or Asthma exacerbation? No   (If yes the patient will be seen every 4 hours for the first 24 hours and then reassessed)    Patient Admission Diagnosis      Allergies  Allergies   Allergen Reactions    Morphine Nausea And Vomiting       Minimum Predicted Vital Capacity:     1065          Actual Vital Capacity:      RONY              Pulmonary History:COPD and CHF/Pulmonary Edema  Home Oxygen Therapy:  room air  Home Respiratory Therapy:Albuterol and Tiotropium bromide/Olodaterol   Current Respiratory Therapy:  MDI Albuterol PRN, MDI Bevespi BID          Respiratory Severity Index(RSI)   Patients with orders for inhalation medications, oxygen, or any therapeutic treatment modality will be placed on Respiratory Protocol. They will be assessed with the first treatment and at least every 72 hours thereafter. The following severity scale will be used to determine frequency of treatment intervention.     Smoking History: Pulmonary Disease or Smoking History, Greater than 15 pack year = 2    Social History  Social History     Tobacco Use    Smoking status: Current Every Day Smoker     Packs/day: 1.00     Years: 33.00     Pack years: 33.00     Types: Cigarettes     Last attempt to quit: 2020     Years since quittin.6    Smokeless tobacco: Never Used    Tobacco comment: TRYING TO QUIT 3-7 a day   Substance Use Topics    Alcohol use: Not Currently     Alcohol/week: 0.0 standard drinks     Comment: occ    Drug use: No       Recent Surgical History: None = 0  Past Surgical History  Past Surgical History:   Procedure Laterality Date    AORTIC VALVE REPLACEMENT N/A 10/15/2019    TRANSCATHETER AORTIC VALVE REPLACEMENT FEMORAL APPROACH performed by Dae Garcia MD at [facility]    CATHETER REMOVAL Right 7/2/2019    PERITONEAL DIALYSIS CATHETER REMOVAL performed by Angel Clifford MD at [address]  2/29/2015    WNL    CORONARY ANGIOPLASTY WITH STENT PLACEMENT  05/26/15    CYST REMOVAL  08/14/2013    EXCISION CYSTS, NECK X2 AND ABDOMINAL benign    DIAGNOSTIC CARDIAC CATH LAB PROCEDURE      DIALYSIS FISTULA CREATION Left 10/30/2017    LEFT BRACHIAL CEPHALIC FISTULA    DIALYSIS FISTULA CREATION Left 3/27/2019    LIGATION  AV FISTULA performed by Isidro Carter MD at  [address], COLON, DIAGNOSTIC      OTHER SURGICAL HISTORY  02/01/2017    laparoscopic cholecystectomy with intraoperative cholangiogram    OTHER SURGICAL HISTORY  2018    PORT PLACEMENT  - vas cath    OTHER SURGICAL HISTORY Bilateral 06/26/2018    laprascopic peritoneal dialysis catheter placement    OTHER SURGICAL HISTORY Right 09/2018    peritoneal dialysis port placed on right side of abdomen    OTHER SURGICAL HISTORY  05/28/2019    PTA/Stenting R External Iliac artery    [place] LAP INSERTION TUNNELED INTRAPERITONEAL CATHETER N/A 9/21/2018    LAPAROSCOPIC PERITONEAL DIALYSIS CATHETER REPLACEMENT performed by Angel Clifford MD at [address] [place] [address]  01/06/2016    UPPER GASTROINTESTINAL ENDOSCOPY  01/29/2017    possible candida, otherwise normal appearing    VASCULAR SURGERY  aprx 2 years ago    2 stents placed, each side of groin       Level of Consciousness: Alert, Oriented, and Cooperative = 0    Level of Activity: Walking unassisted = 0    Respiratory Pattern: Regular Pattern; RR 8-20 = 0    Breath Sounds: Diminshed bilaterally and/or crackles = 2    Sputum   ,  ,    Cough: Strong, spontaneous, non-productive = 0    Vital Signs   /74   Pulse 75   Temp 97.8 °F (36.6 °C) (Axillary)   Resp 20   Ht 5' 9\" (1.753 m)   Wt 260 lb (117.9 kg)   SpO2 92%   BMI 38.40 kg/m²   SPO2 (COPD values may differ): Greater than or equal to 92% on room air = 0    Peak Flow (asthma only): not applicable = 0    RSI: 0-4 = See once and convert to home regimen or discontinue        Plan       Goals: medication delivery, mobilize retained secretions, volume expansion and improve oxygenation    Patient/caregiver was educated on the proper method of use for Respiratory Care Devices:  Yes      Level of patient/caregiver understanding able to:   ? Verbalize understanding   ? Demonstrate understanding       ? Teach back        ? Needs reinforcement       ? No available caregiver               ? Other:     Response to education:  Good     Is patient being placed on Home Treatment Regimen? Yes     Does the patient have everything they need prior to discharge? NA     Comments: Patient admits with leg weakness. Plan of Care: MDI Albuterol PRN, MDI Bevespi BID    Electronically signed by Baljinder Farmer RCP on 12/5/2020 at 1:15 AM    Respiratory Protocol Guidelines     1. Assessment and treatment by Respiratory Therapy will be initiated for medication and therapeutic interventions upon initiation of aerosolized medication. 2. Physician will be contacted for respiratory rate (RR) greater than 35 breaths per minute. Therapy will be held for heart rate (HR) greater than 140 beats per minute, pending direction from physician. 3. Bronchodilators will be administered via Metered Dose Inhaler (MDI) with spacer when the following criteria are met:  a. Alert and cooperative     b. HR < 140 bpm  c. RR < 30 bpm                d. Can demonstrate a 2-3 second inspiratory hold  4. Bronchodilators will be administered via Hand Held Nebulizer PÉREZ [facility] to patients when ANY of the following criteria are met  a. Incognizant or uncooperative          b. Patients treated with HHN at Home        c. Unable to demonstrate proper use of MDI with spacer     d. RR > 30 bpm   5.  Bronchodilators will be delivered via Metered Dose Inhaler (MDI), HHN, Aerogen to intubated patients on mechanical ventilation. 6. Inhalation medication orders will be delivered and/or substituted as outlined below. Aerosolized Medications Ordering and Administration Guidelines:    1. All Medications will be ordered by a physician, and their frequency and/or modality will be adjusted as defined by the patients Respiratory Severity Index (RSI) score. 2. If the patient does not have documented COPD, consider discontinuing anticholinergics when RSI is less than 9.  3. If the bronchospasm worsens (increased RSI), then the bronchodilator frequency can be increased to a maximum of every 4 hours. If greater than every 4 hours is required, the physician will be contacted. 4. If the bronchospasm improves, the frequency of the bronchodilator can be decreased, based on the patient's RSI, but not less than home treatment regimen frequency. 5. Bronchodilator(s) will be discontinued if patient has a RSI less than 9 and has received no scheduled or as needed treatment for 72  Hrs. Patients Ordered on a Mucolytic Agent:    1. Must always be administered with a bronchodilator. 2. Discontinue if patient experiences worsened bronchospasm, or secretions have lessened to the point that the patient is able to clear them with a cough. Anti-inflammatory and Combination Medications:    1. If the patient lacks prior history of lung disease, is not using inhaled anti-inflammatory medication at home, and lacks wheezing by examination or by history for at least 24 hours, contact physician for possible discontinuation.

## 2020-12-05 NOTE — PROGRESS NOTES
12/05/20 0337   NIV Type   Equipment Type v60   Mode CPAP   Mask Type Full face mask   Mask Size Medium   Settings/Measurements   CPAP/EPAP 10 cmH2O   Resp 14   FiO2  21 %   Vt Exhaled 478 mL   Minute Volume 7.1 Liters   Mask Leak (lpm) 24 lpm   Comfort Level Good   Using Accessory Muscles No   Alarm Settings   Alarms On Y   Press Low Alarm 6 cmH2O   High Pressure Alarm 30 cmH2O   Delay Alarm 20 sec(s)   Resp Rate Low Alarm 6   High Respiratory Rate 40 br/min English

## 2020-12-05 NOTE — FLOWSHEET NOTE
Treatment time: 3 hrs  Net UF:3500 ml     Pre weight: 123.5 kg (bed scale)     Post weight:   EDW: 117.0 kg     Access used: LCW TDC  Access function: Good with   ml /min     Medications or blood products given: none     Regular outpatient schedule: Demond HERNÁNDEZ [facility]    Summary of response to treatment: Tolerated  treatment well.  No HD complications noted, 3.5 L removed     Copy of dialysis treatment record placed in chart, to be scanned into EMR    Report given to: Mark       12/05/20 0945 12/05/20 1011 12/05/20 1245   Treatment   Time On 0945  --   --    Time Off  --   --  1245   Vital Signs   BP (!) 150/80  --  (!) 113/93   Temp 98.1 °F (36.7 °C)  --  98 °F (36.7 °C)   Pulse 88  --  93   Resp 18  --  18   SpO2 96 %  --  94 %   Weight 272 lb 4.3 oz (123.5 kg)  --  264 lb 5.3 oz (119.9 kg)   Weight Method Bed scale  --  Bed scale   Dialysis Bath   K+ (Potassium)  --  4  --    Ca+ (Calcium)  --  2.25  --    Na+ (Sodium)  --  138  --    HCO3 (Bicarb)  --  32  --

## 2020-12-05 NOTE — CONSULTS
Nephrology Consult Note  [email or web address]        Reason for Consult: ESRD    HISTORY OF PRESENT ILLNESS:      The patientis a 44 y.o. male with significant past medical history of DM, CHF, COPD, CAD, GERD, HTN and HLD who presents with bilateral LE weakness. Patient complained of difficulty with ambulation because of weakness the past few days. He had ESRD and goes to [facility]. Last HD was Monday. He reports not being able to go to the outpatient HDU because of weakness. On admission, his K+ was 5.2mmol? With sodium of 123mmol/L. We were consulted for routine HD management.     Past Medical History:        Diagnosis Date    Ambulatory dysfunction     walker for long distances, SOB with distance    Aortic stenosis     echo 2017    Arthritis     hands and hips    Asthma     Bilateral hilar adenopathy syndrome 6/3/2013    CAD (coronary artery disease)     Dr. Yobany Bettencourt [facility]) 04/19/2019    EF= 43%    CHF (congestive heart failure) (HCC)     Chronic pain     COPD (chronic obstructive pulmonary disease) ([address]     pulmonology Dr. Reji Guzman    Depression     Diabetes mellitus ([address]     borderline    Difficult intravenous access     Emphysema of lung ([address]     ESRD (end stage renal disease) on dialysis ([address]     MWF    Fear of needles     Gastric ulcer     GERD (gastroesophageal reflux disease)     Heart valve problem     bicuspic valve    Hemodialysis patient ([address]     History of spinal fracture     work incident    Hx of blood clots     Bilateral lower extremities; stents in place    Hyperlipidemia     Hypertension     MI (myocardial infarction) ([address] 2019    has had 9 MIs. 2019 was the last    Neuromuscular disorder ([address]     due to CVA    Numbness and tingling in left arm     from fistula    Pneumonia     PONV (postoperative nausea and vomiting)     Prolonged emergence from general anesthesia     States requires more medication than most people    Sleep apnea     Uses CPAP    Stroke ([facility]     7mm thalamic cva 2017 deficts left side, left side weakness    TIA (transient ischemic attack)     Unspecified diseases of blood and blood-forming organs        Past Surgical History:        Procedure Laterality Date    AORTIC VALVE REPLACEMENT N/A 10/15/2019    TRANSCATHETER AORTIC VALVE REPLACEMENT FEMORAL APPROACH performed by Elise Pritchett MD at [address] Right 7/2/2019    PERITONEAL DIALYSIS CATHETER REMOVAL performed by Varun Serna MD at [facility] COLONOSCOPY  2/29/2015    WN    CORONARY ANGIOPLASTY WITH STENT PLACEMENT  05/26/15    CYST REMOVAL  08/14/2013    EXCISION CYSTS, NECK X2 AND ABDOMINAL benign    DIAGNOSTIC CARDIAC CATH LAB PROCEDURE      DIALYSIS FISTULA CREATION Left 10/30/2017    LEFT BRACHIAL CEPHALIC FISTULA    DIALYSIS FISTULA CREATION Left 3/27/2019    LIGATION  AV FISTULA performed by Josh Mccormick MD at [facility], COLON, DIAGNOSTIC      OTHER SURGICAL HISTORY  02/01/2017    laparoscopic cholecystectomy with intraoperative cholangiogram    OTHER SURGICAL HISTORY  2018    PORT PLACEMENT  - vas cath    OTHER SURGICAL HISTORY Bilateral 06/26/2018    laprascopic peritoneal dialysis catheter placement    OTHER SURGICAL HISTORY Right 09/2018    peritoneal dialysis port placed on right side of abdomen    OTHER SURGICAL HISTORY  05/28/2019    PTA/Stenting R External Iliac artery    [place] LAP INSERTION TUNNELED INTRAPERITONEAL CATHETER N/A 9/21/2018    LAPAROSCOPIC PERITONEAL DIALYSIS CATHETER REPLACEMENT performed by Varun Serna MD at [address] [place] [address]  01/06/2016    UPPER GASTROINTESTINAL ENDOSCOPY  01/29/2017    possible candida, otherwise normal appearing    VASCULAR SURGERY  aprx 2 years ago    2 stents placed, each side of groin       Current Medications:    No current facility-administered medications on file prior to encounter. Current Outpatient Medications on File Prior to Encounter   Medication Sig Dispense Refill    Insulin Pen Needle 31G X 5 MM MISC 1 each by Does not apply route daily 100 each 3    oxyCODONE-acetaminophen (PERCOCET) 7.5-325 MG per tablet Take 1 tablet by mouth every 4 hours as needed for Pain.       hydrALAZINE (APRESOLINE) 50 MG tablet TAKE 1/2 TABLET BY MOUTH EVERY 8 HOURS 90 tablet 2    B Complex-C-Folic Acid (VIRT-CAPS) 1 MG CAPS TK ONE C PO  QD 90 capsule 1    Continuous Blood Gluc Sensor (FREESTYLE STEPHANY 14 DAY SENSOR) MISC 1 each by Does not apply route every 14 days 6 each 3    insulin glargine (BASAGLAR KWIKPEN) 100 UNIT/ML injection pen Inject 70 Units into the skin nightly 15 mL 5    dilTIAZem (CARDIZEM CD) 180 MG extended release capsule TAKE 1 CAPSULE BY MOUTH DAILY 30 capsule 10    traZODone (DESYREL) 150 MG tablet TAKE (1) TABLET BY MOUTH NIGHTLY 30 tablet 10    citalopram (CELEXA) 40 MG tablet TAKE (1) TABLET BY MOUTH DAILY 30 tablet 10    carvedilol (COREG) 12.5 MG tablet TAKE (1) TABLET BY MOUTH TWICE DAILY WITH MEALS 60 tablet 10    hydrOXYzine (VISTARIL) 50 MG capsule TAKE 1 TO 2 CAPSULES BY MOUTH NIGHTLY 180 capsule 10    insulin aspart (NOVOLOG FLEXPEN) 100 UNIT/ML injection pen Inject 20 Units into the skin 3 times daily (before meals) 15 pen 5    clopidogrel (PLAVIX) 75 MG tablet TAKE 1 TABLET BY MOUTH ONCE DAILY 90 tablet 3    pravastatin (PRAVACHOL) 80 MG tablet TAKE (1) TABLET BY MOUTH ONCE DAILY 90 tablet 3    QUEtiapine (SEROQUEL) 50 MG tablet TAKE 1 TABLET BY MOUTH EVERY EVENING 30 tablet 5    isosorbide mononitrate (IMDUR) 30 MG extended release tablet TAKE 1 TABLET BY MOUTH EVERY DAY 90 tablet 10    torsemide (DEMADEX) 100 MG tablet Take 1-2 tablets by mouth daily 180 tablet 3    ondansetron (ZOFRAN ODT) 4 MG disintegrating tablet Take 1 tablet by mouth every 8 hours as needed for Nausea 60 tablet 0    pregabalin (LYRICA) 25 MG capsule Take 1 capsule by mouth 3 times daily for 30 days. 90 capsule 3    LINZESS 145 MCG capsule TAKE 1 CAPSULE BY MOUTH EVERY MORNING BEFORE BREAKFAST 30 capsule 10    Calcium Acetate, Phos Binder, 667 MG CAPS TAKE 1 CAPSULE BY MOUTH THREE TIMES DAILY WITH MEALS 90 capsule 3    tiZANidine (ZANAFLEX) 4 MG tablet TAKE 1 TABLET BY MOUTH THREE TIMES DAILY 90 tablet 10    gabapentin (NEURONTIN) 100 MG capsule TAKE 1 TO 2 CAPSULES BY MOUTH THREE TIMES A DAY (Patient not taking: Reported on 11/10/2020) 540 capsule 10    fluconazole (DIFLUCAN) 150 MG tablet TAKE 1 TABLET BY MOUTH 1 TIME FOR 1 DOSE 1 tablet 0    DULoxetine (CYMBALTA) 30 MG extended release capsule TAKE 1 CAPSULE BY MOUTH EVERY DAY 90 capsule 10    nystatin-triamcinolone (MYCOLOG II) 099014-2.1 UNIT/GM-% cream Apply topically 2 times daily. 60 g 2    losartan (COZAAR) 50 MG tablet TAKE (1) TABLET BY MOUTH DAILY 90 tablet 10    pantoprazole (PROTONIX) 40 MG tablet TAKE (1) TABLET BY MOUTH EACH MORNING BEFORE BREAKFAST 90 tablet 10    albuterol (PROVENTIL) (2.5 MG/3ML) 0.083% nebulizer solution INHALE 1 VIAL VIA NEBULIZER EVERY 6 HOURS AS NEEDED FOR WHEEZING 300 mL 10    nystatin (MYCOSTATIN) 651949 UNIT/GM cream Apply topically 2 times daily. 60 g 3    Insulin Syringe-Needle U-100 30G X 1/2\" 0.5 ML MISC 1 each by Does not apply route daily 100 each 3    nitroGLYCERIN (NITROSTAT) 0.4 MG SL tablet DISSOLVE 1 TABLET UNDER THE TONGUE AS NEEDED FOR CHEST PAIN EVERY 5 MINUTES UP TO 3 TIMES. IF NO RELIEF CALL 911. 25 tablet 10    blood glucose test strips (FREESTYLE LITE) strip Daily As needed.  (Patient not taking: Reported on 11/10/2020) 100 strip 3    glucose monitoring kit (FREESTYLE) monitoring kit 1 kit by Does not apply route daily (Patient not taking: Reported on 11/10/2020) 1 kit 0    vitamin D (ERGOCALCIFEROL) 43770 units CAPS capsule TK 1 C PO WEEKLY  11    flunisolide (NASALIDE) 25 MCG/ACT (0.025%) SOLN Inhale 2 sprays into the lungs every 12 hours 1 Bottle 5    Tiotropium Bromide-Olodaterol (STIOLTO RESPIMAT) 2.5-2.5 MCG/ACT AERS Inhale 2 puffs into the lungs daily 2 Inhaler 0    Polyethylene Glycol 3350 GRAN       Glucose Blood (BLOOD GLUCOSE TEST STRIPS) STRP TEST 3-4 TIMES DAILY, AS DIRECTED (Patient not taking: Reported on 11/10/2020) 100 strip 3    Blood Glucose Monitoring Suppl ADAM USE AS DIRECTED. (Patient not taking: Reported on 11/10/2020) 1 Device 0    Alcohol Swabs PADS USE AS DIRECTED 300 each 3    albuterol sulfate  (90 Base) MCG/ACT inhaler Inhale 2 puffs into the lungs every 6 hours as needed for Wheezing 1 Inhaler 3    ipratropium-albuterol (DUONEB) 0.5-2.5 (3) MG/3ML SOLN nebulizer solution Inhale 3 mLs into the lungs every 6 hours as needed for Shortness of Breath 360 mL 1    Lancets MISC Test daily (Patient not taking: Reported on 11/10/2020) 100 each 3    calcium carbonate (TUMS) 500 MG chewable tablet Take 1 tablet by mouth 3 times daily as needed for Heartburn.  aspirin 81 MG chewable tablet Take 1 tablet by mouth daily.  (Patient taking differently: Take 81 mg by mouth daily Indications: stopped on  for surgery ) 30 tablet 2       Allergies:  Morphine    Social History:    Social History     Socioeconomic History    Marital status:      Spouse name: Not on file    Number of children: Not on file    Years of education: Not on file    Highest education level: Not on file   Occupational History    Not on file   Social Needs    Financial resource strain: Not on file    Food insecurity     Worry: Not on file     Inability: Not on file    Transportation needs     Medical: Not on file     Non-medical: Not on file   Tobacco Use    Smoking status: Current Every Day Smoker     Packs/day: 1.00     Years: 33.00     Pack years: 33.00     Types: Cigarettes     Last attempt to quit: 2020     Years since quittin.6    Smokeless tobacco: Never Used    Tobacco comment: TRYING TO QUIT 3-7 a day   Substance Normal appearance. HENT:      Head: Normocephalic and atraumatic. Eyes:      General: No scleral icterus. Conjunctiva/sclera: Conjunctivae normal.   Cardiovascular:      Rate and Rhythm: Normal rate. Heart sounds: No friction rub. Pulmonary:      Effort: Pulmonary effort is normal. No respiratory distress. Abdominal:      General: There is no distension. Tenderness: There is no abdominal tenderness. Musculoskeletal:      Right lower leg: Edema present. Left lower leg: Edema present. Neurological:      Mental Status: He is alert. DATA:    Recent Labs     12/04/20  1501 12/05/20  0743   WBC 12.0* 10.1   HGB 9.7* 10.0*   HCT 29.2* 29.4*   MCV 88.7 87.9    250     Recent Labs     12/04/20  1501 12/04/20  1614 12/05/20  0742   *  --  129*   K 5.2* 4.5 3.8   CL 84*  --  89*   CO2 25  --  25   GLUCOSE 256*  --  178*   BUN 90*  --  96*   CREATININE 6.9*  --  7.6*   LABGLOM 8*  --  8*   GFRAA 10*  --  9*           IMPRESSION/RECOMMENDATIONS:      ESRD.  - On HD MWF at [facility]. Vascular access: TDC.  - Last HD was 11/30/20. HD today per outpatient orders. Hyperkalemia, mild. - Better with lab work this morning. Hyponatremia. - Likely from fluid in an ESRD patient. - UF with HD. Fluid restriction. Hypertension.  - BP is acceptable. - On Torsemide, Carvedilol, Diltiazem, Hydralazine, Imdur and Losartan. Anemia.  - Continue DAVON with HD.  - Hgb 10g/dL. Bilateral LE Weakness.  - MRI of the lumbar spine result reviewed. - Neurology consulted. Thank you for allowing me to participate in the care of this patient. Please do not hesitate to contact me at [phone] with questions.     Diamond Medel MD  12/5/2020  [facility]

## 2020-12-05 NOTE — FLOWSHEET NOTE
Treatment time: 3 hrs  Net UF:3500 ml     Pre weight: 123.5 kg (bed scale)     Post weight:   EDW: 117.0 kg     Access used: LCW TDC  Access function: Good with   ml /min     Medications or blood products given: none     Regular outpatient schedule: Demond HERNÁNDEZ [facility]    Summary of response to treatment: Tolerated  treatment well.  No HD complications noted, 3.5 L removed     Copy of dialysis treatment record placed in chart, to be scanned into EMR    Report given to: Mark       12/05/20 0945 12/05/20 1011 12/05/20 1245   Treatment   Time On 0945  --   --    Time Off  --   --  1245   Vital Signs   BP (!) 150/80  --  (!) 113/93   Temp 98.1 °F (36.7 °C)  --  98 °F (36.7 °C)   Pulse 88  --  93   Resp 18  --  18   SpO2 96 %  --  94 %   Weight 272 lb 4.3 oz (123.5 kg)  --  264 lb 5.3 oz (119.9 kg)   Weight Method Bed scale  --  Bed scale   Dialysis Bath   K+ (Potassium)  --  4  --    Ca+ (Calcium)  --  2.25  --    Na+ (Sodium)  --  138  --    HCO3 (Bicarb)  --  32  --

## 2020-12-05 NOTE — PROGRESS NOTES
12/05/20 @ 7275 -Neurosurgery consult called in at this time and sent out to Dr. Luis Armando Charles. RNs number given for callback if needed.     Josemanuel Garcia, PCA

## 2020-12-06 LAB
ANION GAP SERPL CALCULATED.3IONS-SCNC: 14 MMOL/L (ref 3–16)
BASOPHILS ABSOLUTE: 0.1 K/UL (ref 0–0.2)
BASOPHILS RELATIVE PERCENT: 0.7 %
BUN BLDV-MCNC: 60 MG/DL (ref 7–20)
CALCIUM SERPL-MCNC: 9 MG/DL (ref 8.3–10.6)
CHLORIDE BLD-SCNC: 91 MMOL/L (ref 99–110)
CO2: 23 MMOL/L (ref 21–32)
CREAT SERPL-MCNC: 6.3 MG/DL (ref 0.9–1.3)
EOSINOPHILS ABSOLUTE: 0.4 K/UL (ref 0–0.6)
EOSINOPHILS RELATIVE PERCENT: 3.7 %
ESTIMATED AVERAGE GLUCOSE: 266.1 MG/DL
GFR AFRICAN AMERICAN: 11
GFR NON-AFRICAN AMERICAN: 9
GLUCOSE BLD-MCNC: 235 MG/DL (ref 70–99)
GLUCOSE BLD-MCNC: 243 MG/DL (ref 70–99)
GLUCOSE BLD-MCNC: 278 MG/DL (ref 70–99)
GLUCOSE BLD-MCNC: 292 MG/DL (ref 70–99)
HBA1C MFR BLD: 10.9 %
HBV SURFACE AB TITR SER: <3.5 MIU/ML
HCT VFR BLD CALC: 31.7 % (ref 40.5–52.5)
HEMOGLOBIN: 10.5 G/DL (ref 13.5–17.5)
LYMPHOCYTES ABSOLUTE: 1.2 K/UL (ref 1–5.1)
LYMPHOCYTES RELATIVE PERCENT: 12.2 %
MCH RBC QN AUTO: 29.8 PG (ref 26–34)
MCHC RBC AUTO-ENTMCNC: 33.3 G/DL (ref 31–36)
MCV RBC AUTO: 89.5 FL (ref 80–100)
MONOCYTES ABSOLUTE: 0.6 K/UL (ref 0–1.3)
MONOCYTES RELATIVE PERCENT: 6.4 %
NEUTROPHILS ABSOLUTE: 7.6 K/UL (ref 1.7–7.7)
NEUTROPHILS RELATIVE PERCENT: 77 %
PDW BLD-RTO: 13.5 % (ref 12.4–15.4)
PERFORMED ON: ABNORMAL
PLATELET # BLD: 277 K/UL (ref 135–450)
PMV BLD AUTO: 8.2 FL (ref 5–10.5)
POTASSIUM SERPL-SCNC: 4.9 MMOL/L (ref 3.5–5.1)
RBC # BLD: 3.54 M/UL (ref 4.2–5.9)
SODIUM BLD-SCNC: 128 MMOL/L (ref 136–145)
TROPONIN: 0.04 NG/ML
WBC # BLD: 9.8 K/UL (ref 4–11)

## 2020-12-06 PROCEDURE — 84484 ASSAY OF TROPONIN QUANT: CPT

## 2020-12-06 PROCEDURE — 6370000000 HC RX 637 (ALT 250 FOR IP): Performed by: NURSE PRACTITIONER

## 2020-12-06 PROCEDURE — 2580000003 HC RX 258: Performed by: INTERNAL MEDICINE

## 2020-12-06 PROCEDURE — 6360000002 HC RX W HCPCS: Performed by: INTERNAL MEDICINE

## 2020-12-06 PROCEDURE — 93005 ELECTROCARDIOGRAM TRACING: CPT | Performed by: NURSE PRACTITIONER

## 2020-12-06 PROCEDURE — 94761 N-INVAS EAR/PLS OXIMETRY MLT: CPT

## 2020-12-06 PROCEDURE — 85025 COMPLETE CBC W/AUTO DIFF WBC: CPT

## 2020-12-06 PROCEDURE — 2700000000 HC OXYGEN THERAPY PER DAY

## 2020-12-06 PROCEDURE — 94640 AIRWAY INHALATION TREATMENT: CPT

## 2020-12-06 PROCEDURE — 6370000000 HC RX 637 (ALT 250 FOR IP): Performed by: INTERNAL MEDICINE

## 2020-12-06 PROCEDURE — 94660 CPAP INITIATION&MGMT: CPT

## 2020-12-06 PROCEDURE — 36415 COLL VENOUS BLD VENIPUNCTURE: CPT

## 2020-12-06 PROCEDURE — 1200000000 HC SEMI PRIVATE

## 2020-12-06 PROCEDURE — 80048 BASIC METABOLIC PNL TOTAL CA: CPT

## 2020-12-06 RX ORDER — CALCIUM CARBONATE 200(500)MG
500 TABLET,CHEWABLE ORAL 3 TIMES DAILY PRN
Status: DISCONTINUED | OUTPATIENT
Start: 2020-12-06 | End: 2020-12-21 | Stop reason: HOSPADM

## 2020-12-06 RX ORDER — OXYCODONE AND ACETAMINOPHEN 10; 325 MG/1; MG/1
1 TABLET ORAL EVERY 6 HOURS PRN
Status: DISCONTINUED | OUTPATIENT
Start: 2020-12-06 | End: 2020-12-21 | Stop reason: HOSPADM

## 2020-12-06 RX ADMIN — TRAZODONE HYDROCHLORIDE 150 MG: 50 TABLET ORAL at 21:04

## 2020-12-06 RX ADMIN — INSULIN LISPRO 6 UNITS: 100 INJECTION, SOLUTION INTRAVENOUS; SUBCUTANEOUS at 12:32

## 2020-12-06 RX ADMIN — TIZANIDINE 2 MG: 4 TABLET ORAL at 21:04

## 2020-12-06 RX ADMIN — PREGABALIN 25 MG: 25 CAPSULE ORAL at 08:49

## 2020-12-06 RX ADMIN — PREGABALIN 25 MG: 25 CAPSULE ORAL at 13:38

## 2020-12-06 RX ADMIN — OXYCODONE HYDROCHLORIDE AND ACETAMINOPHEN 1 TABLET: 7.5; 325 TABLET ORAL at 03:13

## 2020-12-06 RX ADMIN — SODIUM CHLORIDE, PRESERVATIVE FREE 10 ML: 5 INJECTION INTRAVENOUS at 20:07

## 2020-12-06 RX ADMIN — OXYCODONE HYDROCHLORIDE AND ACETAMINOPHEN 1 TABLET: 10; 325 TABLET ORAL at 20:06

## 2020-12-06 RX ADMIN — HYDRALAZINE HYDROCHLORIDE 25 MG: 25 TABLET, FILM COATED ORAL at 13:38

## 2020-12-06 RX ADMIN — HYDRALAZINE HYDROCHLORIDE 25 MG: 25 TABLET, FILM COATED ORAL at 21:04

## 2020-12-06 RX ADMIN — HEPARIN SODIUM 5000 UNITS: 5000 INJECTION INTRAVENOUS; SUBCUTANEOUS at 21:04

## 2020-12-06 RX ADMIN — TIZANIDINE 2 MG: 4 TABLET ORAL at 08:48

## 2020-12-06 RX ADMIN — PRAVASTATIN SODIUM 80 MG: 80 TABLET ORAL at 20:06

## 2020-12-06 RX ADMIN — TORSEMIDE 100 MG: 100 TABLET ORAL at 08:48

## 2020-12-06 RX ADMIN — OXYCODONE HYDROCHLORIDE AND ACETAMINOPHEN 1 TABLET: 7.5; 325 TABLET ORAL at 13:44

## 2020-12-06 RX ADMIN — INSULIN GLARGINE 70 UNITS: 100 INJECTION, SOLUTION SUBCUTANEOUS at 21:04

## 2020-12-06 RX ADMIN — GLYCOPYRROLATE AND FORMOTEROL FUMARATE 2 PUFF: 9; 4.8 AEROSOL, METERED RESPIRATORY (INHALATION) at 19:07

## 2020-12-06 RX ADMIN — INSULIN LISPRO 3 UNITS: 100 INJECTION, SOLUTION INTRAVENOUS; SUBCUTANEOUS at 21:05

## 2020-12-06 RX ADMIN — INSULIN LISPRO 4 UNITS: 100 INJECTION, SOLUTION INTRAVENOUS; SUBCUTANEOUS at 08:50

## 2020-12-06 RX ADMIN — QUETIAPINE FUMARATE 50 MG: 25 TABLET ORAL at 19:52

## 2020-12-06 RX ADMIN — CLOPIDOGREL BISULFATE 75 MG: 75 TABLET ORAL at 08:48

## 2020-12-06 RX ADMIN — OXYCODONE HYDROCHLORIDE AND ACETAMINOPHEN 1 TABLET: 7.5; 325 TABLET ORAL at 08:49

## 2020-12-06 RX ADMIN — ONDANSETRON 4 MG: 2 INJECTION INTRAMUSCULAR; INTRAVENOUS at 18:56

## 2020-12-06 RX ADMIN — TIZANIDINE 2 MG: 4 TABLET ORAL at 13:38

## 2020-12-06 RX ADMIN — PANTOPRAZOLE SODIUM 40 MG: 40 TABLET, DELAYED RELEASE ORAL at 06:10

## 2020-12-06 RX ADMIN — ANTACID TABLETS 500 MG: 500 TABLET, CHEWABLE ORAL at 20:06

## 2020-12-06 RX ADMIN — DILTIAZEM HYDROCHLORIDE 180 MG: 180 CAPSULE, COATED, EXTENDED RELEASE ORAL at 08:49

## 2020-12-06 RX ADMIN — GABAPENTIN 100 MG: 100 CAPSULE ORAL at 08:49

## 2020-12-06 RX ADMIN — ISOSORBIDE MONONITRATE 30 MG: 30 TABLET, EXTENDED RELEASE ORAL at 08:48

## 2020-12-06 RX ADMIN — HYDRALAZINE HYDROCHLORIDE 25 MG: 25 TABLET, FILM COATED ORAL at 06:10

## 2020-12-06 RX ADMIN — HEPARIN SODIUM 5000 UNITS: 5000 INJECTION INTRAVENOUS; SUBCUTANEOUS at 06:10

## 2020-12-06 RX ADMIN — Medication 2 PUFF: at 19:02

## 2020-12-06 RX ADMIN — GABAPENTIN 100 MG: 100 CAPSULE ORAL at 13:38

## 2020-12-06 RX ADMIN — ASPIRIN 81 MG CHEWABLE TABLET 81 MG: 81 TABLET CHEWABLE at 08:48

## 2020-12-06 RX ADMIN — SODIUM CHLORIDE, PRESERVATIVE FREE 10 ML: 5 INJECTION INTRAVENOUS at 08:49

## 2020-12-06 RX ADMIN — CARVEDILOL 12.5 MG: 6.25 TABLET, FILM COATED ORAL at 08:48

## 2020-12-06 RX ADMIN — GLYCOPYRROLATE AND FORMOTEROL FUMARATE 2 PUFF: 9; 4.8 AEROSOL, METERED RESPIRATORY (INHALATION) at 08:23

## 2020-12-06 RX ADMIN — PREGABALIN 25 MG: 25 CAPSULE ORAL at 20:06

## 2020-12-06 RX ADMIN — GABAPENTIN 100 MG: 100 CAPSULE ORAL at 20:06

## 2020-12-06 RX ADMIN — LOSARTAN POTASSIUM 50 MG: 25 TABLET, FILM COATED ORAL at 08:48

## 2020-12-06 RX ADMIN — DULOXETINE HYDROCHLORIDE 30 MG: 30 CAPSULE, DELAYED RELEASE ORAL at 08:48

## 2020-12-06 RX ADMIN — INSULIN LISPRO 6 UNITS: 100 INJECTION, SOLUTION INTRAVENOUS; SUBCUTANEOUS at 19:53

## 2020-12-06 RX ADMIN — HEPARIN SODIUM 5000 UNITS: 5000 INJECTION INTRAVENOUS; SUBCUTANEOUS at 13:38

## 2020-12-06 ASSESSMENT — PAIN SCALES - GENERAL
PAINLEVEL_OUTOF10: 6
PAINLEVEL_OUTOF10: 10
PAINLEVEL_OUTOF10: 10
PAINLEVEL_OUTOF10: 8
PAINLEVEL_OUTOF10: 6

## 2020-12-06 ASSESSMENT — PAIN DESCRIPTION - LOCATION
LOCATION: BACK

## 2020-12-06 NOTE — PROGRESS NOTES
12/05/20 2233   NIV Type   Skin Protection for O2 Device Yes   Equipment Type V60   Mode CPAP   Mask Type Full face mask   Mask Size Medium   Settings/Measurements   CPAP/EPAP 10 cmH2O   Resp 14   FiO2  21 %   Vt Exhaled 1202 mL   Minute Volume 15 Liters   Mask Leak (lpm) 23 lpm   Comfort Level Good   Using Accessory Muscles No   SpO2 98

## 2020-12-06 NOTE — PROGRESS NOTES
Nephrology Progress Note   [email or web address]      This patient is a 44year old male whom we are following for ESRD. Subjective: The patient was seen and examined. Still complaining that his R leg is \"burning\". Had HD yesterday and said it was terrible. Net UF of 3.5L. Family History: No family at bedside  ROS: No SOB or chest pain      Vitals:  /67   Pulse 83   Temp 97.9 °F (36.6 °C) (Oral)   Resp 16   Ht 5' 9\" (1.753 m)   Wt 264 lb 5.3 oz (119.9 kg)   SpO2 94%   BMI 39.03 kg/m²   I/O last 3 completed shifts: In: 400   Out: 1 [Urine:600]  I/O this shift:  In: -   Out: 50 [Urine:50]    Physical Exam:  Physical Exam  Vitals signs reviewed. Constitutional:       General: He is not in acute distress. Appearance: Normal appearance. HENT:      Head: Normocephalic and atraumatic. Eyes:      General: No scleral icterus. Conjunctiva/sclera: Conjunctivae normal.   Cardiovascular:      Rate and Rhythm: Normal rate. Heart sounds: No friction rub. Pulmonary:      Effort: Pulmonary effort is normal. No respiratory distress. Breath sounds: No wheezing. Abdominal:      General: Bowel sounds are normal. There is no distension. Tenderness: There is no abdominal tenderness. Musculoskeletal:      Right lower leg: No edema. Left lower leg: No edema. Neurological:      Mental Status: He is alert.        Access: TDC      Medications:   lidocaine  1 patch Transdermal Daily    insulin lispro  0-12 Units Subcutaneous TID WC    insulin lispro  0-6 Units Subcutaneous Nightly    aspirin  81 mg Oral Daily    carvedilol  12.5 mg Oral BID WC    clopidogrel  75 mg Oral Daily    dilTIAZem  180 mg Oral Daily    DULoxetine  30 mg Oral Daily    gabapentin  100 mg Oral TID    hydrALAZINE  25 mg Oral 3 times per day    insulin glargine  70 Units Subcutaneous Nightly    isosorbide mononitrate  30 mg Oral Daily    linaclotide  145 mcg Oral QAM AC    losartan  50 mg Oral Daily

## 2020-12-06 NOTE — PROGRESS NOTES
Hospitalist Progress Note      PCP: Ethel Crisostomo MD    Date of Admission: 12/4/2020    Chief Complaint: Bilateral lower extremity weakness, numbness, and inability to walk for 2 days. Hospital Course: 44 y.o. male who presented to [facility] with above complaint. He has a history of diabetes and neuropathy. He does have lower back pain for long time. He has been feeling more numbness for last 2 days and today he could not get up and walk. He felt like he does not have leg. He fell couple of times and there is no apparent injuries. Currently he does not feel below the mid calf bilateral lower extremities. And he is able to move some extent lower extremities but unable to walk. Subjective:  Limited BLE movement. Can stand at side of the bed, but not walk  C/o lower back pain-chronic. Requesting increase in pain meds.        Medications:  Reviewed    Infusion Medications   Scheduled Medications    lidocaine  1 patch Transdermal Daily    insulin lispro  0-12 Units Subcutaneous TID WC    insulin lispro  0-6 Units Subcutaneous Nightly    aspirin  81 mg Oral Daily    carvedilol  12.5 mg Oral BID WC    clopidogrel  75 mg Oral Daily    dilTIAZem  180 mg Oral Daily    DULoxetine  30 mg Oral Daily    gabapentin  100 mg Oral TID    hydrALAZINE  25 mg Oral 3 times per day    insulin glargine  70 Units Subcutaneous Nightly    isosorbide mononitrate  30 mg Oral Daily    linaclotide  145 mcg Oral QAM AC    losartan  50 mg Oral Daily    pantoprazole  40 mg Oral QAM AC    pravastatin  80 mg Oral Nightly    pregabalin  25 mg Oral TID    QUEtiapine  50 mg Oral QPM    glycopyrrolate-formoterol  2 puff Inhalation BID    tiZANidine  2 mg Oral TID    torsemide  100 mg Oral Daily    traZODone  150 mg Oral Nightly    sodium chloride flush  10 mL Intravenous 2 times per day    heparin (porcine)  5,000 Units Subcutaneous 3 times per day     PRN Meds: oxyCODONE-acetaminophen, albuterol sulfate HFA, heparin (porcine), sodium chloride flush, acetaminophen **OR** acetaminophen, polyethylene glycol, promethazine **OR** ondansetron      Intake/Output Summary (Last 24 hours) at 12/6/2020 1348  Last data filed at 12/6/2020 0809  Gross per 24 hour   Intake --   Output 50 ml   Net -50 ml       Physical Exam Performed:    /67   Pulse 83   Temp 97.9 °F (36.6 °C) (Oral)   Resp 16   Ht 5' 9\" (1.753 m)   Wt 264 lb 5.3 oz (119.9 kg)   SpO2 94%   BMI 39.03 kg/m²     General appearance: No apparent distress, appears stated age and cooperative. HEENT: Pupils equal, round, and reactive to light. Conjunctivae/corneas clear. Neck: Supple, with full range of motion. No jugular venous distention. Trachea midline. Respiratory:  Normal respiratory effort. Clear to auscultation, bilaterally without Rales/Wheezes/Rhonchi. Cardiovascular: Regular rate and rhythm with normal S1/S2 without murmurs, rubs or gallops. Abdomen: Soft, non-tender, non-distended with normal bowel sounds. Musculoskeletal: No clubbing, cyanosis or edema bilaterally. Full range of motion without deformity. Skin: Skin color, texture, turgor normal.  No rashes or lesions. Neurologic:  Abnormal sensation below the knee bilaterally. 3/5 weakness BLE.    Psychiatric: Alert and oriented, thought content appropriate, normal insight  Capillary Refill: Brisk,< 3 seconds   Peripheral Pulses: +2 palpable, equal bilaterally       Labs:   Recent Labs     12/04/20  1501 12/05/20  0743 12/06/20  0930   WBC 12.0* 10.1 9.8   HGB 9.7* 10.0* 10.5*   HCT 29.2* 29.4* 31.7*    250 277     Recent Labs     12/04/20  1501 12/04/20  1614 12/05/20  0742 12/06/20  0930   *  --  129* 128*   K 5.2* 4.5 3.8 4.9   CL 84*  --  89* 91*   CO2 25  --  25 23   BUN 90*  --  96* 60*   CREATININE 6.9*  --  7.6* 6.3*   CALCIUM 9.2  --  8.8 9.0     Recent Labs     12/04/20  1501   AST 35   ALT 18   BILITOT <0.2   ALKPHOS 96     No results for input(s): INR in the last 72 hours. No results for input(s): Dolores Rodriguez in the last 72 hours. Urinalysis:      Lab Results   Component Value Date    NITRU Negative 06/05/2020    WBCUA 10-20 06/05/2020    BACTERIA 2+ 06/05/2020    RBCUA 0-2 06/05/2020    BLOODU TRACE-INTACT 06/05/2020    SPECGRAV 1.020 06/05/2020    GLUCOSEU 250 06/05/2020       Radiology:  MRI THORACIC SPINE WO CONTRAST   Final Result   No acute thoracic spine abnormality. No thoracic spinal canal or neural   foraminal stenosis. MRI CERVICAL SPINE WO CONTRAST   Final Result   No acute cervical spine abnormality. Mild degenerative spondylosis without significant cervical spinal canal   stenosis or high-grade neural foraminal stenosis. Mild left C4-C5 and right C6-C7 neural foraminal stenosis. MRI LUMBAR SPINE WO CONTRAST   Final Result   1. Mild L1-2 and L5-S1 degenerative disc height loss. 2. Moderate right L5 neural foraminal narrowing secondary to a right   foraminal L5-S1 disc protrusion. 3. Mild L1-2 spinal canal stenosis. CT ABDOMEN PELVIS WO CONTRAST   Final Result   1. No CT evidence of an acute intra-abdominal or intrapelvic process. 2.  No findings to suggest acute appendicitis; no ureter calculus or   hydronephrosis. 3. Probably reactive lymph nodes in the right upper quadrant, unchanged from   prior study. 4.  Mild extrahepatic bile duct dilatation status post cholecystectomy   typical of reservoir effect. 5.  Calcific atherosclerotic disease aorta. 6. Small, fat containing umbilical hernia. 7. Benign left adrenal adenoma requires no additional evaluation or follow-up.                  Assessment/Plan:    Active Hospital Problems    Diagnosis    GBS (Guillain-Bethany syndrome) ([facility]) [G61.0]    Bilateral leg weakness [R29.898]    ESRD (end stage renal disease) on dialysis ([facility] [address] [N18.6, Z99.2]    DM (diabetes mellitus), secondary, uncontrolled, w/neurologic complic ([facility] [address] [Y77.63, Q50.71]     Bilateral lower extremity weakness numbness - unclear etiology. Exam seems inconsistent.  - MRI shows L5-S1 disc protrusion.  - discussed with Ortho Spine - recommended MRI cervical and thoracic spine to rule out epidural abscess, given elevated ESR and CRP. This was WNL  - neurology consulted and follwing     ESRD on HD   - nephrology consulted and following.  - planning for HD today as he missed two HD treatments.     Chronic back pain   - continue home medication and muscle relaxant  - temp increase to Percocet from 7.5 to 10mg/325. Discussed that we will not provide this change for him at d/c since he follows with pain management .     DM2 with neuropathy  - continue Lantus and medium dose SSI. - continue gabapentin and Cymbalta. Hyponatremia   - nephrology following.  - hold Celexa.     Anemia - likely due to ESRD.     Chronic diastolic CHF - no evidence of fluid overload     COPD - stable.  Continue home inhalers     CAD -  Continue Coreg, aspirin, Plavix and Pravachol      DVT Prophylaxis: Subcutaneous Heparin  Diet: DIET CARB CONTROL; Daily Fluid Restriction: 2000 ml  Code Status: Full Code    PT/OT Eval Status: ordered    Dispo - likely 1-3 days inpatient    Rochester Spatz, APRN - CNP

## 2020-12-06 NOTE — PROGRESS NOTES
MRIs of Mr. Angely Bryant' cervical, thoracic and lumbar spines show no significant spinal cord or nerve root compression. I cannot explain his lower extremity symptoms. I will defer further treatment recommendations to Dr. Daysi Delong.

## 2020-12-06 NOTE — PROGRESS NOTES
Hospitalist Progress Note      PCP: Claudetta Benes, MD    Date of Admission: 12/4/2020    Chief Complaint: Bilateral lower extremity weakness, numbness, and inability to walk for 2 days. Hospital Course: 44 y.o. male who presented to [facility] with above complaint. He has a history of diabetes and neuropathy. He does have lower back pain for long time. He has been feeling more numbness for last 2 days and today he could not get up and walk. He felt like he does not have leg. He fell couple of times and there is no apparent injuries. Currently he does not feel below the mid calf bilateral lower extremities. And he is able to move some extent lower extremities but unable to walk. Subjective: Planning for HD today. Discussed case with Ortho Spine.       Medications:  Reviewed    Infusion Medications   Scheduled Medications    lidocaine  1 patch Transdermal Daily    aspirin  81 mg Oral Daily    carvedilol  12.5 mg Oral BID WC    clopidogrel  75 mg Oral Daily    dilTIAZem  180 mg Oral Daily    DULoxetine  30 mg Oral Daily    gabapentin  100 mg Oral TID    hydrALAZINE  25 mg Oral 3 times per day    insulin glargine  70 Units Subcutaneous Nightly    isosorbide mononitrate  30 mg Oral Daily    linaclotide  145 mcg Oral QAM AC    losartan  50 mg Oral Daily    pantoprazole  40 mg Oral QAM AC    pravastatin  80 mg Oral Nightly    pregabalin  25 mg Oral TID    QUEtiapine  50 mg Oral QPM    glycopyrrolate-formoterol  2 puff Inhalation BID    tiZANidine  2 mg Oral TID    torsemide  100 mg Oral Daily    traZODone  150 mg Oral Nightly    sodium chloride flush  10 mL Intravenous 2 times per day    heparin (porcine)  5,000 Units Subcutaneous 3 times per day    insulin lispro  0-6 Units Subcutaneous TID     insulin lispro  0-3 Units Subcutaneous Nightly     PRN Meds: albuterol sulfate HFA, heparin (porcine), oxyCODONE-acetaminophen, sodium chloride flush, acetaminophen **OR** acetaminophen, polyethylene glycol, promethazine **OR** ondansetron      Intake/Output Summary (Last 24 hours) at 12/5/2020 2041  Last data filed at 12/5/2020 1245  Gross per 24 hour   Intake 400 ml   Output 4750 ml   Net -4350 ml       Physical Exam Performed:    BP (!) 82/56   Pulse 85   Temp 98.4 °F (36.9 °C) (Oral)   Resp 18   Ht 5' 9\" (1.753 m)   Wt 264 lb 5.3 oz (119.9 kg)   SpO2 96%   BMI 39.03 kg/m²     General appearance: No apparent distress, appears stated age and cooperative. HEENT: Pupils equal, round, and reactive to light. Conjunctivae/corneas clear. Neck: Supple, with full range of motion. No jugular venous distention. Trachea midline. Respiratory:  Normal respiratory effort. Clear to auscultation, bilaterally without Rales/Wheezes/Rhonchi. Cardiovascular: Regular rate and rhythm with normal S1/S2 without murmurs, rubs or gallops. Abdomen: Soft, non-tender, non-distended with normal bowel sounds. Musculoskeletal: No clubbing, cyanosis or edema bilaterally. Full range of motion without deformity. Skin: Skin color, texture, turgor normal.  No rashes or lesions. Neurologic:  Abnormal sensation below the knee bilaterally. 3/5 weakness BLE. Psychiatric: Alert and oriented, thought content appropriate, normal insight  Capillary Refill: Brisk,< 3 seconds   Peripheral Pulses: +2 palpable, equal bilaterally       Labs:   Recent Labs     12/04/20  1501 12/05/20  0743   WBC 12.0* 10.1   HGB 9.7* 10.0*   HCT 29.2* 29.4*    250     Recent Labs     12/04/20  1501 12/04/20  1614 12/05/20  0742   *  --  129*   K 5.2* 4.5 3.8   CL 84*  --  89*   CO2 25  --  25   BUN 90*  --  96*   CREATININE 6.9*  --  7.6*   CALCIUM 9.2  --  8.8     Recent Labs     12/04/20  1501   AST 35   ALT 18   BILITOT <0.2   ALKPHOS 96     No results for input(s): INR in the last 72 hours. No results for input(s): Teryl Drown in the last 72 hours.     Urinalysis:      Lab Results   Component Value Date NITRU Negative 06/05/2020    WBCUA 10-20 06/05/2020    BACTERIA 2+ 06/05/2020    RBCUA 0-2 06/05/2020    BLOODU TRACE-INTACT 06/05/2020    SPECGRAV 1.020 06/05/2020    GLUCOSEU 250 06/05/2020       Radiology:  MRI THORACIC SPINE WO CONTRAST   Final Result   No acute thoracic spine abnormality. No thoracic spinal canal or neural   foraminal stenosis. MRI CERVICAL SPINE WO CONTRAST   Final Result   No acute cervical spine abnormality. Mild degenerative spondylosis without significant cervical spinal canal   stenosis or high-grade neural foraminal stenosis. Mild left C4-C5 and right C6-C7 neural foraminal stenosis. MRI LUMBAR SPINE WO CONTRAST   Final Result   1. Mild L1-2 and L5-S1 degenerative disc height loss. 2. Moderate right L5 neural foraminal narrowing secondary to a right   foraminal L5-S1 disc protrusion. 3. Mild L1-2 spinal canal stenosis. CT ABDOMEN PELVIS WO CONTRAST   Final Result   1. No CT evidence of an acute intra-abdominal or intrapelvic process. 2.  No findings to suggest acute appendicitis; no ureter calculus or   hydronephrosis. 3. Probably reactive lymph nodes in the right upper quadrant, unchanged from   prior study. 4.  Mild extrahepatic bile duct dilatation status post cholecystectomy   typical of reservoir effect. 5.  Calcific atherosclerotic disease aorta. 6. Small, fat containing umbilical hernia. 7. Benign left adrenal adenoma requires no additional evaluation or follow-up. Assessment/Plan:    Active Hospital Problems    Diagnosis    GBS (Guillain-Sagamore syndrome) ([facility]) [G61.0]    Bilateral leg weakness [R29.898]    ESRD (end stage renal disease) on dialysis ([facility] [address] [N18.6, Z99.2]    DM (diabetes mellitus), secondary, uncontrolled, w/neurologic complic ([facility] [address] [C03.30, F12.33]     Bilateral lower extremity weakness numbness - unclear etiology.   Exam seems inconsistent.  - MRI shows L5-S1 disc protrusion.  - discussed with Ortho Spine - recommended MRI cervical and thoracic spine to rule out epidural abscess, given elevated ESR and CRP. - neurology consulted.     ESRD on HD   - nephrology consulted and following.  - planning for HD today as he missed two HD treatments.     Chronic back pain   - continue home medication and muscle relaxant     DM2 with neuropathy  - continue Lantus and medium dose SSI. - continue gabapentin and Cymbalta. Hyponatremia   - nephrology following.  - hold Celexa.     Anemia - likely due to ESRD.     Chronic diastolic CHF - no evidence of fluid overload     COPD - stable.  Continue home inhalers     CAD -  Continue Coreg, aspirin, Plavix and Pravachol      DVT Prophylaxis: Subcutaneous Heparin  Diet: DIET CARB CONTROL; Daily Fluid Restriction: 2000 ml  Code Status: Full Code    PT/OT Eval Status: ordered    Dispo - likely 1-3 days inpatient    JAY Gallagher - CNP

## 2020-12-06 NOTE — CARE COORDINATION
CASE MANAGEMENT INITIAL ASSESSMENT      Reviewed chart and completed assessment via telephone with: pt  Explained Case Management role/services. Primary contact information: spouse Dorian Lamb:  Who do you trust or have selected to make healthcare decisions for you? Name:  roshni Alarcon   Phone  Number: [phone]    Admit date/status: 12/4  Diagnosis: Onofre leg weakness  Is this a Readmission?:  Yes      [address] required for SNF: Yes       3 night stay required: No    Living arrangements, Adls, care needs, prior to admission:Pt lives with spouse in one story home with ramp in. Pt at baseline uses rollator to move around home, able to perform person ADLS. Can at times drive when necessary. Transportation: tbd, spouse or cab if to home    [facility] at home:  Walker__Canex__RTS__ BSC__Shower Chair__  02__ HHN_x_ CPAP_x [facility]WOMEN) Bed__ W/C___ Other_rollator_________    Services in the home and/or outpatient, prior to admission: Aides 4 hrs day 2/week thru [employer] for housekeeping    Dialysis Facility (if applicable)   · Name: [place]  · Address:  · Dialysis Schedule: [facility] [address]  · Phone:  · Fax:    PT/OT recs none available    Hospital Exemption Notification (HEN): not initiated    Barriers to discharge: none    Plan/comments: Pt has been to [facility] in and would not want to return. Has used [address] in past and would use again. Pt is not sure what he will need at d/c, is concerned that he cannot feel his legs or walk. Await PT/OT when appropriate. DCP following.      ECOC on chart for MD signature

## 2020-12-06 NOTE — PROGRESS NOTES
Perfect serve Dr. Liz Joshi c/o tightness in chest, dizziness, SOB. HX multiple MI's and strokes. EKG ordered. Ordering albuterol treatment now.

## 2020-12-06 NOTE — PROGRESS NOTES
12/05/20 2350   NIV Type   NIV Started/Stopped On   Equipment Type v60   Mode CPAP   Mask Type Full face mask   Mask Size Medium   Settings/Measurements   CPAP/EPAP 10 cmH2O   Resp 16   FiO2  21 %   Vt Exhaled 554 mL   Mask Leak (lpm) 47 lpm   Comfort Level Good   Using Accessory Muscles No   Alarm Settings   Alarms On Y   Press Low Alarm 6 cmH2O   High Pressure Alarm 30 cmH2O   Resp Rate Low Alarm 6   High Respiratory Rate 40 br/min

## 2020-12-07 ENCOUNTER — APPOINTMENT (OUTPATIENT)
Dept: CT IMAGING | Age: 50
DRG: 555 | End: 2020-12-07
Payer: COMMERCIAL

## 2020-12-07 LAB
ANION GAP SERPL CALCULATED.3IONS-SCNC: 14 MMOL/L (ref 3–16)
BASOPHILS ABSOLUTE: 0.1 K/UL (ref 0–0.2)
BASOPHILS RELATIVE PERCENT: 0.7 %
BUN BLDV-MCNC: 71 MG/DL (ref 7–20)
CALCIUM SERPL-MCNC: 9 MG/DL (ref 8.3–10.6)
CHLORIDE BLD-SCNC: 89 MMOL/L (ref 99–110)
CO2: 22 MMOL/L (ref 21–32)
CREAT SERPL-MCNC: 7.1 MG/DL (ref 0.9–1.3)
EKG ATRIAL RATE: 58 BPM
EKG DIAGNOSIS: NORMAL
EKG P AXIS: 73 DEGREES
EKG P-R INTERVAL: 240 MS
EKG Q-T INTERVAL: 470 MS
EKG QRS DURATION: 92 MS
EKG QTC CALCULATION (BAZETT): 461 MS
EKG R AXIS: 56 DEGREES
EKG T AXIS: 65 DEGREES
EKG VENTRICULAR RATE: 58 BPM
EOSINOPHILS ABSOLUTE: 0.4 K/UL (ref 0–0.6)
EOSINOPHILS RELATIVE PERCENT: 3.2 %
GFR AFRICAN AMERICAN: 10
GFR NON-AFRICAN AMERICAN: 8
GLUCOSE BLD-MCNC: 177 MG/DL (ref 70–99)
GLUCOSE BLD-MCNC: 209 MG/DL (ref 70–99)
GLUCOSE BLD-MCNC: 220 MG/DL (ref 70–99)
GLUCOSE BLD-MCNC: 225 MG/DL (ref 70–99)
HCT VFR BLD CALC: 30 % (ref 40.5–52.5)
HEMOGLOBIN: 10.1 G/DL (ref 13.5–17.5)
HEPATITIS B SURFACE ANTIGEN INTERPRETATION: NORMAL
HEPATITIS BE ANTIGEN: NEGATIVE
INR BLD: 1 (ref 0.86–1.14)
LACTIC ACID: 0.5 MMOL/L (ref 0.4–2)
LYMPHOCYTES ABSOLUTE: 1.2 K/UL (ref 1–5.1)
LYMPHOCYTES RELATIVE PERCENT: 10.5 %
MCH RBC QN AUTO: 30.3 PG (ref 26–34)
MCHC RBC AUTO-ENTMCNC: 33.7 G/DL (ref 31–36)
MCV RBC AUTO: 89.9 FL (ref 80–100)
MONOCYTES ABSOLUTE: 0.6 K/UL (ref 0–1.3)
MONOCYTES RELATIVE PERCENT: 5.6 %
NEUTROPHILS ABSOLUTE: 8.9 K/UL (ref 1.7–7.7)
NEUTROPHILS RELATIVE PERCENT: 80 %
PDW BLD-RTO: 13.2 % (ref 12.4–15.4)
PERFORMED ON: ABNORMAL
PLATELET # BLD: 246 K/UL (ref 135–450)
PMV BLD AUTO: 7.4 FL (ref 5–10.5)
POTASSIUM REFLEX MAGNESIUM: 5 MMOL/L (ref 3.5–5.1)
PROCALCITONIN: 0.6 NG/ML (ref 0–0.15)
PROTHROMBIN TIME: 11.6 SEC (ref 10–13.2)
RBC # BLD: 3.33 M/UL (ref 4.2–5.9)
SODIUM BLD-SCNC: 125 MMOL/L (ref 136–145)
TROPONIN: 0.04 NG/ML
TROPONIN: 0.04 NG/ML
WBC # BLD: 11.2 K/UL (ref 4–11)

## 2020-12-07 PROCEDURE — 84145 PROCALCITONIN (PCT): CPT

## 2020-12-07 PROCEDURE — 6360000002 HC RX W HCPCS: Performed by: INTERNAL MEDICINE

## 2020-12-07 PROCEDURE — 94660 CPAP INITIATION&MGMT: CPT

## 2020-12-07 PROCEDURE — 36415 COLL VENOUS BLD VENIPUNCTURE: CPT

## 2020-12-07 PROCEDURE — 90935 HEMODIALYSIS ONE EVALUATION: CPT

## 2020-12-07 PROCEDURE — 1200000000 HC SEMI PRIVATE

## 2020-12-07 PROCEDURE — 94640 AIRWAY INHALATION TREATMENT: CPT

## 2020-12-07 PROCEDURE — 2700000000 HC OXYGEN THERAPY PER DAY

## 2020-12-07 PROCEDURE — 6370000000 HC RX 637 (ALT 250 FOR IP): Performed by: NURSE PRACTITIONER

## 2020-12-07 PROCEDURE — 2580000003 HC RX 258: Performed by: INTERNAL MEDICINE

## 2020-12-07 PROCEDURE — 85025 COMPLETE CBC W/AUTO DIFF WBC: CPT

## 2020-12-07 PROCEDURE — 84484 ASSAY OF TROPONIN QUANT: CPT

## 2020-12-07 PROCEDURE — 6360000002 HC RX W HCPCS: Performed by: NURSE PRACTITIONER

## 2020-12-07 PROCEDURE — 83605 ASSAY OF LACTIC ACID: CPT

## 2020-12-07 PROCEDURE — 6360000004 HC RX CONTRAST MEDICATION: Performed by: NURSE PRACTITIONER

## 2020-12-07 PROCEDURE — 94761 N-INVAS EAR/PLS OXIMETRY MLT: CPT

## 2020-12-07 PROCEDURE — 6370000000 HC RX 637 (ALT 250 FOR IP): Performed by: INTERNAL MEDICINE

## 2020-12-07 PROCEDURE — 85610 PROTHROMBIN TIME: CPT

## 2020-12-07 PROCEDURE — 93010 ELECTROCARDIOGRAM REPORT: CPT | Performed by: INTERNAL MEDICINE

## 2020-12-07 PROCEDURE — 99233 SBSQ HOSP IP/OBS HIGH 50: CPT | Performed by: PSYCHIATRY & NEUROLOGY

## 2020-12-07 PROCEDURE — 80048 BASIC METABOLIC PNL TOTAL CA: CPT

## 2020-12-07 PROCEDURE — 74177 CT ABD & PELVIS W/CONTRAST: CPT

## 2020-12-07 RX ORDER — PROMETHAZINE HYDROCHLORIDE 25 MG/ML
INJECTION, SOLUTION INTRAMUSCULAR; INTRAVENOUS
Status: DISPENSED
Start: 2020-12-07 | End: 2020-12-07

## 2020-12-07 RX ORDER — PROCHLORPERAZINE EDISYLATE 5 MG/ML
5 INJECTION INTRAMUSCULAR; INTRAVENOUS EVERY 6 HOURS PRN
Status: DISCONTINUED | OUTPATIENT
Start: 2020-12-07 | End: 2020-12-21 | Stop reason: HOSPADM

## 2020-12-07 RX ORDER — PROMETHAZINE HYDROCHLORIDE 25 MG/ML
12.5 INJECTION, SOLUTION INTRAMUSCULAR; INTRAVENOUS ONCE
Status: COMPLETED | OUTPATIENT
Start: 2020-12-07 | End: 2020-12-07

## 2020-12-07 RX ADMIN — TRAZODONE HYDROCHLORIDE 150 MG: 50 TABLET ORAL at 23:01

## 2020-12-07 RX ADMIN — PROMETHAZINE HYDROCHLORIDE 12.5 MG: 25 TABLET ORAL at 16:53

## 2020-12-07 RX ADMIN — INSULIN LISPRO 4 UNITS: 100 INJECTION, SOLUTION INTRAVENOUS; SUBCUTANEOUS at 18:12

## 2020-12-07 RX ADMIN — PREGABALIN 25 MG: 25 CAPSULE ORAL at 23:00

## 2020-12-07 RX ADMIN — ONDANSETRON 4 MG: 2 INJECTION INTRAMUSCULAR; INTRAVENOUS at 09:09

## 2020-12-07 RX ADMIN — OXYCODONE HYDROCHLORIDE AND ACETAMINOPHEN 1 TABLET: 10; 325 TABLET ORAL at 03:14

## 2020-12-07 RX ADMIN — INSULIN LISPRO 4 UNITS: 100 INJECTION, SOLUTION INTRAVENOUS; SUBCUTANEOUS at 13:42

## 2020-12-07 RX ADMIN — INSULIN GLARGINE 70 UNITS: 100 INJECTION, SOLUTION SUBCUTANEOUS at 21:28

## 2020-12-07 RX ADMIN — HEPARIN SODIUM 5000 UNITS: 5000 INJECTION INTRAVENOUS; SUBCUTANEOUS at 05:57

## 2020-12-07 RX ADMIN — ONDANSETRON 4 MG: 2 INJECTION INTRAMUSCULAR; INTRAVENOUS at 02:58

## 2020-12-07 RX ADMIN — OXYCODONE HYDROCHLORIDE AND ACETAMINOPHEN 1 TABLET: 10; 325 TABLET ORAL at 09:09

## 2020-12-07 RX ADMIN — PRAVASTATIN SODIUM 80 MG: 80 TABLET ORAL at 23:00

## 2020-12-07 RX ADMIN — GABAPENTIN 100 MG: 100 CAPSULE ORAL at 23:00

## 2020-12-07 RX ADMIN — PROMETHAZINE HYDROCHLORIDE 12.5 MG: 25 INJECTION INTRAMUSCULAR; INTRAVENOUS at 11:59

## 2020-12-07 RX ADMIN — SODIUM CHLORIDE, PRESERVATIVE FREE 10 ML: 5 INJECTION INTRAVENOUS at 21:28

## 2020-12-07 RX ADMIN — GLYCOPYRROLATE AND FORMOTEROL FUMARATE 2 PUFF: 9; 4.8 AEROSOL, METERED RESPIRATORY (INHALATION) at 20:34

## 2020-12-07 RX ADMIN — HEPARIN SODIUM 5000 UNITS: 5000 INJECTION INTRAVENOUS; SUBCUTANEOUS at 23:01

## 2020-12-07 RX ADMIN — OXYCODONE HYDROCHLORIDE AND ACETAMINOPHEN 1 TABLET: 10; 325 TABLET ORAL at 20:54

## 2020-12-07 RX ADMIN — HYDRALAZINE HYDROCHLORIDE 25 MG: 25 TABLET, FILM COATED ORAL at 05:57

## 2020-12-07 RX ADMIN — ANTACID TABLETS 500 MG: 500 TABLET, CHEWABLE ORAL at 03:12

## 2020-12-07 RX ADMIN — PROCHLORPERAZINE EDISYLATE 5 MG: 5 INJECTION INTRAMUSCULAR; INTRAVENOUS at 21:28

## 2020-12-07 RX ADMIN — TIZANIDINE 2 MG: 4 TABLET ORAL at 23:00

## 2020-12-07 RX ADMIN — IOPAMIDOL 75 ML: 755 INJECTION, SOLUTION INTRAVENOUS at 12:57

## 2020-12-07 RX ADMIN — PANTOPRAZOLE SODIUM 40 MG: 40 TABLET, DELAYED RELEASE ORAL at 05:58

## 2020-12-07 ASSESSMENT — PAIN SCALES - GENERAL
PAINLEVEL_OUTOF10: 10
PAINLEVEL_OUTOF10: 10

## 2020-12-07 NOTE — FLOWSHEET NOTE
12/07/20 0725 12/07/20 1116   Treatment   Time On 0733  --    Time Off  --  1109   Treatment Goal 4000 3500   Vital Signs   /70 (!) 126/92   Temp 97.2 °F (36.2 °C) 98.2 °F (36.8 °C)   Pulse 81 81   Resp 18 18   Weight 270 lb 4.5 oz (122.6 kg) 262 lb 5.6 oz (119 kg)   Weight Method Bed scale Bed scale   Percent Weight Change 2.25 -2.94   Dry Weight 257 lb 15 oz (117 kg)  --    Treatment Initiation   Dialyze Hours 3.5  --    Dialysis Bath   K+ (Potassium) 2  --    Ca+ (Calcium) 2.5  --    Na+ (Sodium) 138  --    HCO3 (Bicarb) 32  --    Post-Hemodialysis Assessment   Rinseback Volume (ml)  --  400 ml   Total Liters Processed (l/min)  --  64.7 l/min   Dialyzer Clearance  --  Lightly streaked   Duration of Treatment (minutes)  --  210 minutes   Heparin amount administered during treatment (units)  --  0 units   Hemodialysis Intake (ml)  --  0 ml   Hemodialysis Output (ml)  --  4026 ml   NET Removed (ml)  --  3626 ml   Tolerated Treatment  --  Good   Tolerated 3.5 hour HD tx well, net UF 3.6L. Unable to determine refill, hct not recorded but most of tx in profile B. Aranesp not in [employer], pharmacy called to reschedule. Report to floor RN and tx record placed in chart for scan into the EMR.

## 2020-12-07 NOTE — PROGRESS NOTES
Grover Worley  Neurology Follow-up  [facility] Neurology    Date of Service: 12/7/2020    Subjective:   CC: Follow up today regarding: lower leg weakness and falling. Events noted. Chart and lab reviewed. The patient denies any new symptoms today. He continues to feel weak in his legs with distal paresthesia. No severe back pain. He had MRI of his spine over the weekend which showed mild to moderate DJD but no significant spinal stenosis or disc protrusions. He is complaining of nausea and vomiting today. No headache, dysphagia, dysarthria, weakness in the arms or numbness or tingling. No breathing difficulties or double vision. Other review of system was unremarkable. ROS : A 10-12 system review obtained and updated today and is unremarkable except as mentioned  in my interval history. family history includes Alcohol Abuse in his brother; Cancer in his sister and sister; Diabetes in his mother; Heart Disease in his father, sister, and sister; Kidney Disease in his sister; Obesity in his sister and sister.     Past Medical History:   Diagnosis Date    Ambulatory dysfunction     walker for long distances, SOB with distance    Aortic stenosis     echo 2017    Arthritis     hands and hips    Asthma     Bilateral hilar adenopathy syndrome 6/3/2013    CAD (coronary artery disease)     Dr. Lizbet Grigsby [facility]) 04/19/2019    EF= 43%    CHF (congestive heart failure) (HCC)     Chronic pain     COPD (chronic obstructive pulmonary disease) ([address]     pulmonology Dr. Celeste Adan    Depression     Diabetes mellitus ([address]     borderline    Difficult intravenous access     Emphysema of lung ([address]     ESRD (end stage renal disease) on dialysis ([address]     MWF    Fear of needles     Gastric ulcer     GERD (gastroesophageal reflux disease)     Heart valve problem     bicuspic valve    Hemodialysis patient ([address]     History of spinal fracture     work incident    Hx of blood clots     Bilateral lower extremities; stents in place    Hyperlipidemia     Hypertension     MI (myocardial infarction) ([facility] 2019    has had 9 MIs. 2019 was the last    Neuromuscular disorder ([facility]     due to CVA    Numbness and tingling in left arm     from fistula    Pneumonia     PONV (postoperative nausea and vomiting)     Prolonged emergence from general anesthesia     States requires more medication than most people    Sleep apnea     Uses CPAP    Stroke ([facility]     7mm thalamic cva 2017 deficts left side, left side weakness    TIA (transient ischemic attack)     Unspecified diseases of blood and blood-forming organs      Current Facility-Administered Medications   Medication Dose Route Frequency Provider Last Rate Last Dose    promethazine (PHENERGAN) 25 MG/ML injection             oxyCODONE-acetaminophen (PERCOCET)  MG per tablet 1 tablet  1 tablet Oral G7C PRN JAY Padilla CNP   1 tablet at 12/07/20 0909    calcium carbonate (TUMS) chewable tablet 500 mg  500 mg Oral TID PRN JAY Saleh CNP   500 mg at 12/07/20 0312    albuterol sulfate  (90 Base) MCG/ACT inhaler 2 puff  2 puff Inhalation Q6H PRN Norma Brooks MD   2 puff at 12/06/20 1902    heparin (porcine) injection 4,000 Units  4,000 Units Intracatheter PRN Cooper Betancourt MD        lidocaine 4 % external patch 1 patch  1 patch Transdermal Daily JAY Saleh CNP   1 patch at 12/07/20 1202    insulin lispro (HUMALOG) injection vial 0-12 Units  0-12 Units Subcutaneous TID  JAY Bui CNP   4 Units at 12/07/20 1342    insulin lispro (HUMALOG) injection vial 0-6 Units  0-6 Units Subcutaneous Nightly JAY Bui CNP   3 Units at 12/06/20 2105    aspirin chewable tablet 81 mg  81 mg Oral Daily Norma Brooks MD   81 mg at 12/06/20 0848    carvedilol (COREG) tablet 12.5 mg  12.5 mg Oral BID  Norma Brooks MD   12.5 mg at 12/06/20 0848    clopidogrel (PLAVIX) tablet 75 mg  75 mg Oral Daily Edgar Palmer MD   75 mg at 12/06/20 0848    dilTIAZem (CARDIZEM CD) extended release capsule 180 mg  180 mg Oral Daily Edgar Palmer MD   180 mg at 12/06/20 0849    DULoxetine (CYMBALTA) extended release capsule 30 mg  30 mg Oral Daily Edgar Palmer MD   30 mg at 12/06/20 0848    gabapentin (NEURONTIN) capsule 100 mg  100 mg Oral TID Edgar Palmer MD   100 mg at 12/06/20 2006    insulin glargine (LANTUS) injection vial 70 Units  70 Units Subcutaneous Nightly Edgar Palmer MD   70 Units at 12/06/20 2104    isosorbide mononitrate (IMDUR) extended release tablet 30 mg  30 mg Oral Daily Edgar Palmer MD   30 mg at 12/06/20 0848    linaclotide (LINZESS) capsule 145 mcg  145 mcg Oral QAM AC Edgar Palmer MD        losartan (COZAAR) tablet 50 mg  50 mg Oral Daily Edgar Palmer MD   50 mg at 12/06/20 0848    pantoprazole (PROTONIX) tablet 40 mg  40 mg Oral QAM AC Edgar Palmer MD   40 mg at 12/07/20 0558    pravastatin (PRAVACHOL) tablet 80 mg  80 mg Oral Nightly Edgar Palmer MD   80 mg at 12/06/20 2006    pregabalin (LYRICA) capsule 25 mg  25 mg Oral TID Edgar Palmer MD   25 mg at 12/06/20 2006    QUEtiapine (SEROQUEL) tablet 50 mg  50 mg Oral QPM Edgar Palmer MD   50 mg at 12/06/20 1952    glycopyrrolate-formoterol (BEVESPI) 9-4.8 MCG/ACT inhaler 2 puff  2 puff Inhalation BID Edgar Palmer MD   2 puff at 12/06/20 1907    tiZANidine (ZANAFLEX) tablet 2 mg  2 mg Oral TID Edgar Palmer MD   2 mg at 12/06/20 2104    torsemide (DEMADEX) tablet 100 mg  100 mg Oral Daily Edgar Palmer MD   100 mg at 12/06/20 0848    traZODone (DESYREL) tablet 150 mg  150 mg Oral Nightly Edgar Palmer MD   150 mg at 12/06/20 2104    sodium chloride flush 0.9 % injection 10 mL  10 mL Intravenous 2 times per day Edgar Palmer MD   10 mL at 12/06/20 2007    sodium chloride flush 0.9 % injection 10 mL  10 mL Intravenous PRN Solo Brink MD        acetaminophen (TYLENOL) tablet 650 mg  650 mg Oral Q6H PRN Solo Brink MD        Or    acetaminophen (TYLENOL) suppository 650 mg  650 mg Rectal Q6H PRN Solo Brink MD        polyethylene glycol (GLYCOLAX) packet 17 g  17 g Oral Daily PRN Solo Brink MD        promethazine (PHENERGAN) tablet 12.5 mg  12.5 mg Oral Q6H PRN Solo Brink MD        Or    ondansetron (ZOFRAN) injection 4 mg  4 mg Intravenous Q6H PRN Solo Brink MD   4 mg at 12/07/20 0909    heparin (porcine) injection 5,000 Units  5,000 Units Subcutaneous 3 times per day Solo Brink MD   5,000 Units at 12/07/20 0557     Allergies   Allergen Reactions    Morphine Nausea And Vomiting      reports that he has been smoking cigarettes. He has a 33.00 pack-year smoking history. He has never used smokeless tobacco. He reports previous alcohol use. He reports that he does not use drugs. Objective:  Exam:   Constitutional:   Vitals:    12/07/20 0557 12/07/20 0725 12/07/20 1116 12/07/20 1206   BP: (!) 155/79 125/70 (!) 126/92 (!) 145/83   Pulse:  81 81 94   Resp:  18 18 18   Temp:  97.2 °F (36.2 °C) 98.2 °F (36.8 °C) 97.6 °F (36.4 °C)   TempSrc:    Oral   SpO2:    92%   Weight:  270 lb 4.5 oz (122.6 kg) 262 lb 5.6 oz (119 kg)    Height:         General appearance:  Normal development and appear in no acute distress. Eye: No icterus. Neck: supple  Cardiovascular:  No lower leg edema with good pulsation. Mental Status:   Oriented to person, place, problem, and time. Memory: Good immediate recall. Intact remote memory  Normal attention span and concentration. Language: intact naming, repeating and fluency   Good fund of Knowledge. Cranial Nerves:   II: Visual fields: Full. Pupils: equal, round, reactive to light  III,IV,VI: Extra Ocular Movements are intact.  No nystagmus  V: Facial sensation is intact  VII: Facial strength and movements: intact and symmetric  IX: Palate polyneuropathy  Hyponatremia  Hyperlipidemia  Obesity  End-stage renal disease      Recommendation  LP under IR. Hold aspirin and Plavix for LP  Continue PT and OT  Insulin sliding scale  Blood sugar control  Continue scheduled HD  Telemetry  Blood pressure monitor  Continue SSRI  Hydration and monitor lytes  Fall precaution  DVT and GI prophylaxis  We will follow. Fernando Cm MD   [phone]      This dictation was generated by voice recognition computer software. Although all attempts are made to edit the dictation for accuracy, there may be errors in the transcription that are not intended.

## 2020-12-07 NOTE — PROGRESS NOTES
12/07/20 0413   NIV Type   Equipment Type v60   Mode CPAP   Mask Type Full face mask   Mask Size Medium   Settings/Measurements   CPAP/EPAP 10 cmH2O   Resp 11   FiO2  21 %   Vt Exhaled 582 mL   Minute Volume 6.4 Liters   Mask Leak (lpm) 43 lpm   Comfort Level Good   Using Accessory Muscles No   Alarm Settings   Alarms On Y   Press Low Alarm 6 cmH2O   High Pressure Alarm 30 cmH2O   Delay Alarm 20 sec(s)   Resp Rate Low Alarm 6   High Respiratory Rate 40 br/min

## 2020-12-07 NOTE — PROGRESS NOTES
Nephrology Progress Note   [email or web address]      This patient is a 44year old male whom we are following for ESRD. Subjective: The patient was seen and examined on dialysis. Significant weight gain over the weekend. BP borderline low. Complaining of nausea and vomiting. Family History: No family at bedside  ROS: No SOB or chest pain      Vitals:  BP (!) 155/79   Pulse 78   Temp 97.9 °F (36.6 °C) (Oral)   Resp 11   Ht 5' 9\" (1.753 m)   Wt 264 lb 5.3 oz (119.9 kg)   SpO2 94%   BMI 39.03 kg/m²   I/O last 3 completed shifts: In: 240 [P.O.:240]  Out: 300 [Urine:300]  No intake/output data recorded. Physical Exam:  Physical Exam  Vitals signs reviewed. Constitutional:       General: He is not in acute distress. Appearance: Normal appearance. HENT:      Head: Normocephalic and atraumatic. Eyes:      General: No scleral icterus. Conjunctiva/sclera: Conjunctivae normal.   Cardiovascular:      Rate and Rhythm: Normal rate. Heart sounds: No friction rub. Pulmonary:      Effort: Pulmonary effort is normal. No respiratory distress. Breath sounds: No wheezing. Abdominal:      General: Bowel sounds are normal. There is no distension. Tenderness: There is no abdominal tenderness. Musculoskeletal:      Right lower leg: No edema. Left lower leg: No edema. Neurological:      Mental Status: He is alert.        Access: TDC      Medications:   lidocaine  1 patch Transdermal Daily    insulin lispro  0-12 Units Subcutaneous TID WC    insulin lispro  0-6 Units Subcutaneous Nightly    aspirin  81 mg Oral Daily    carvedilol  12.5 mg Oral BID WC    clopidogrel  75 mg Oral Daily    dilTIAZem  180 mg Oral Daily    DULoxetine  30 mg Oral Daily    gabapentin  100 mg Oral TID    hydrALAZINE  25 mg Oral 3 times per day    insulin glargine  70 Units Subcutaneous Nightly    isosorbide mononitrate  30 mg Oral Daily    linaclotide  145 mcg Oral QAM AC    losartan  50 mg Oral Daily    pantoprazole  40 mg Oral QAM AC    pravastatin  80 mg Oral Nightly    pregabalin  25 mg Oral TID    QUEtiapine  50 mg Oral QPM    glycopyrrolate-formoterol  2 puff Inhalation BID    tiZANidine  2 mg Oral TID    torsemide  100 mg Oral Daily    traZODone  150 mg Oral Nightly    sodium chloride flush  10 mL Intravenous 2 times per day    heparin (porcine)  5,000 Units Subcutaneous 3 times per day         Labs:  Recent Labs     12/05/20  0743 12/06/20 0930 12/07/20 0624   WBC 10.1 9.8 11.2*   HGB 10.0* 10.5* 10.1*   HCT 29.4* 31.7* 30.0*   MCV 87.9 89.5 89.9    277 246     Recent Labs     12/05/20  0742 12/06/20 0930 12/07/20 0624   * 128* 125*   K 3.8 4.9 5.0   CL 89* 91* 89*   CO2 25 23 22   GLUCOSE 178* 243* 225*   BUN 96* 60* 71*   CREATININE 7.6* 6.3* 7.1*   LABGLOM 8* 9* 8*   GFRAA 9* 11* 10*           Assessment/Plan:    ESRD.  - On HD MWF at [facility]. Vascular access: TDC.     Hyponatremia. - Likely from fluid in an ESRD patient. - UF with HD. Fluid restriction.     Hypertension.  - BP is low normal.  - On Torsemide, Carvedilol, Diltiazem, Imdur and Losartan. - Will D/C Hydralazine.     Anemia.  - Continue DAVON with HD.  - Hgb 10g/dL.     Bilateral LE Weakness.  - MRI of the lumbar/cervical/thoracic spine result reviewed. - Neurology following. Please do not hesitate to contact me at [phone] if with questions. Thank you!     Ok Lorenzana MD  12/7/2020  [facility]

## 2020-12-07 NOTE — CARE COORDINATION
Chart review day 3:  Pt followed by IM, Nephro, Ortho w/new consult for Neuro. Pt here for new onset lower leg weakness. At baseline, pt uses a rollator and able to move around home;  CM made request for PT/OT and will assist w/needs, including possible SNF. Pt has been active w/[facility] in the past and has been to [facility] and does not want to return. Pt currently MWF [facility].   Codie Miller RN

## 2020-12-07 NOTE — PROGRESS NOTES
Perfect serve Dr. Yenni Davsi: Pt troponin came back at 0.4. No further c/o of chest tightness of SOB at this time.  Thanks

## 2020-12-07 NOTE — PROGRESS NOTES
12/06/20 2300   NIV Type   Skin Assessment Clean, dry, & intact   Skin Protection for O2 Device No  (pt refused)   NIV Started/Stopped On   Equipment Type v60   Mode CPAP   Mask Type Full face mask   Mask Size Medium   Settings/Measurements   CPAP/EPAP 10 cmH2O   Resp 21   FiO2  21 %   Vt Exhaled 679 mL   Minute Volume 14.3 Liters   Mask Leak (lpm) 44 lpm   Comfort Level Good   Using Accessory Muscles No   Alarm Settings   Alarms On Y   Press Low Alarm 6 cmH2O   High Pressure Alarm 30 cmH2O   Delay Alarm 20 sec(s)   Resp Rate Low Alarm 6   High Respiratory Rate 40 br/min

## 2020-12-07 NOTE — PROGRESS NOTES
Notified Dr. Dori Doran:     Dialysis called again and said patient is still vomiting and having significant pain in his back. Can a one time dose of something be ordered? Thanks.

## 2020-12-07 NOTE — PROGRESS NOTES
Notified Dr. Margarito Stroud via perfect serve:    Patient is in dialysis and is actively vomiting and having c/o pain. Patient is unable to get PRN Zofran or Percocet due to admin time being to close from last dose. Please advise.     Awaiting response

## 2020-12-08 ENCOUNTER — APPOINTMENT (OUTPATIENT)
Dept: INTERVENTIONAL RADIOLOGY/VASCULAR | Age: 50
DRG: 555 | End: 2020-12-08
Payer: COMMERCIAL

## 2020-12-08 LAB
ANION GAP SERPL CALCULATED.3IONS-SCNC: 12 MMOL/L (ref 3–16)
BASOPHILS ABSOLUTE: 0 K/UL (ref 0–0.2)
BASOPHILS RELATIVE PERCENT: 0.4 %
BUN BLDV-MCNC: 40 MG/DL (ref 7–20)
CALCIUM SERPL-MCNC: 9 MG/DL (ref 8.3–10.6)
CHLORIDE BLD-SCNC: 91 MMOL/L (ref 99–110)
CO2: 23 MMOL/L (ref 21–32)
CREAT SERPL-MCNC: 5.1 MG/DL (ref 0.9–1.3)
EOSINOPHILS ABSOLUTE: 0.4 K/UL (ref 0–0.6)
EOSINOPHILS RELATIVE PERCENT: 3.1 %
GFR AFRICAN AMERICAN: 14
GFR NON-AFRICAN AMERICAN: 12
GLUCOSE BLD-MCNC: 121 MG/DL (ref 70–99)
GLUCOSE BLD-MCNC: 124 MG/DL (ref 70–99)
GLUCOSE BLD-MCNC: 130 MG/DL (ref 70–99)
GLUCOSE BLD-MCNC: 145 MG/DL (ref 70–99)
GLUCOSE BLD-MCNC: 197 MG/DL (ref 70–99)
HCT VFR BLD CALC: 28.6 % (ref 40.5–52.5)
HEMOGLOBIN: 9.6 G/DL (ref 13.5–17.5)
LYMPHOCYTES ABSOLUTE: 1.3 K/UL (ref 1–5.1)
LYMPHOCYTES RELATIVE PERCENT: 12.1 %
MCH RBC QN AUTO: 29.6 PG (ref 26–34)
MCHC RBC AUTO-ENTMCNC: 33.5 G/DL (ref 31–36)
MCV RBC AUTO: 88.1 FL (ref 80–100)
MONOCYTES ABSOLUTE: 0.8 K/UL (ref 0–1.3)
MONOCYTES RELATIVE PERCENT: 7 %
NEUTROPHILS ABSOLUTE: 8.7 K/UL (ref 1.7–7.7)
NEUTROPHILS RELATIVE PERCENT: 77.4 %
PDW BLD-RTO: 13.2 % (ref 12.4–15.4)
PERFORMED ON: ABNORMAL
PLATELET # BLD: 273 K/UL (ref 135–450)
PMV BLD AUTO: 7.3 FL (ref 5–10.5)
POTASSIUM REFLEX MAGNESIUM: 4.5 MMOL/L (ref 3.5–5.1)
RBC # BLD: 3.25 M/UL (ref 4.2–5.9)
SODIUM BLD-SCNC: 126 MMOL/L (ref 136–145)
WBC # BLD: 11.2 K/UL (ref 4–11)

## 2020-12-08 PROCEDURE — 1200000000 HC SEMI PRIVATE

## 2020-12-08 PROCEDURE — 94660 CPAP INITIATION&MGMT: CPT

## 2020-12-08 PROCEDURE — 94640 AIRWAY INHALATION TREATMENT: CPT

## 2020-12-08 PROCEDURE — 97530 THERAPEUTIC ACTIVITIES: CPT

## 2020-12-08 PROCEDURE — 6360000002 HC RX W HCPCS: Performed by: INTERNAL MEDICINE

## 2020-12-08 PROCEDURE — 6370000000 HC RX 637 (ALT 250 FOR IP): Performed by: NURSE PRACTITIONER

## 2020-12-08 PROCEDURE — 97166 OT EVAL MOD COMPLEX 45 MIN: CPT

## 2020-12-08 PROCEDURE — 97162 PT EVAL MOD COMPLEX 30 MIN: CPT

## 2020-12-08 PROCEDURE — 36415 COLL VENOUS BLD VENIPUNCTURE: CPT

## 2020-12-08 PROCEDURE — 6360000002 HC RX W HCPCS: Performed by: NURSE PRACTITIONER

## 2020-12-08 PROCEDURE — 2700000000 HC OXYGEN THERAPY PER DAY

## 2020-12-08 PROCEDURE — 80048 BASIC METABOLIC PNL TOTAL CA: CPT

## 2020-12-08 PROCEDURE — 97116 GAIT TRAINING THERAPY: CPT

## 2020-12-08 PROCEDURE — 94761 N-INVAS EAR/PLS OXIMETRY MLT: CPT

## 2020-12-08 PROCEDURE — 6370000000 HC RX 637 (ALT 250 FOR IP): Performed by: INTERNAL MEDICINE

## 2020-12-08 PROCEDURE — 85025 COMPLETE CBC W/AUTO DIFF WBC: CPT

## 2020-12-08 PROCEDURE — 99233 SBSQ HOSP IP/OBS HIGH 50: CPT | Performed by: PSYCHIATRY & NEUROLOGY

## 2020-12-08 PROCEDURE — 2580000003 HC RX 258: Performed by: INTERNAL MEDICINE

## 2020-12-08 RX ORDER — LORAZEPAM 2 MG/ML
0.5 INJECTION INTRAMUSCULAR ONCE
Status: COMPLETED | OUTPATIENT
Start: 2020-12-08 | End: 2020-12-08

## 2020-12-08 RX ADMIN — DULOXETINE HYDROCHLORIDE 30 MG: 30 CAPSULE, DELAYED RELEASE ORAL at 09:29

## 2020-12-08 RX ADMIN — GABAPENTIN 100 MG: 100 CAPSULE ORAL at 15:40

## 2020-12-08 RX ADMIN — OXYCODONE HYDROCHLORIDE AND ACETAMINOPHEN 1 TABLET: 10; 325 TABLET ORAL at 09:34

## 2020-12-08 RX ADMIN — ISOSORBIDE MONONITRATE 30 MG: 30 TABLET, EXTENDED RELEASE ORAL at 09:29

## 2020-12-08 RX ADMIN — TIZANIDINE 2 MG: 4 TABLET ORAL at 09:29

## 2020-12-08 RX ADMIN — PRAVASTATIN SODIUM 80 MG: 80 TABLET ORAL at 21:38

## 2020-12-08 RX ADMIN — PREGABALIN 25 MG: 25 CAPSULE ORAL at 21:38

## 2020-12-08 RX ADMIN — GABAPENTIN 100 MG: 100 CAPSULE ORAL at 09:29

## 2020-12-08 RX ADMIN — ANTACID TABLETS 500 MG: 500 TABLET, CHEWABLE ORAL at 23:45

## 2020-12-08 RX ADMIN — GABAPENTIN 100 MG: 100 CAPSULE ORAL at 21:37

## 2020-12-08 RX ADMIN — OXYCODONE HYDROCHLORIDE AND ACETAMINOPHEN 1 TABLET: 10; 325 TABLET ORAL at 15:40

## 2020-12-08 RX ADMIN — LORAZEPAM 0.5 MG: 2 INJECTION, SOLUTION INTRAMUSCULAR; INTRAVENOUS at 12:08

## 2020-12-08 RX ADMIN — TORSEMIDE 100 MG: 100 TABLET ORAL at 15:40

## 2020-12-08 RX ADMIN — QUETIAPINE FUMARATE 50 MG: 25 TABLET ORAL at 18:43

## 2020-12-08 RX ADMIN — OXYCODONE HYDROCHLORIDE AND ACETAMINOPHEN 1 TABLET: 10; 325 TABLET ORAL at 21:37

## 2020-12-08 RX ADMIN — CARVEDILOL 12.5 MG: 6.25 TABLET, FILM COATED ORAL at 09:29

## 2020-12-08 RX ADMIN — PREGABALIN 25 MG: 25 CAPSULE ORAL at 09:29

## 2020-12-08 RX ADMIN — INSULIN LISPRO 1 UNITS: 100 INJECTION, SOLUTION INTRAVENOUS; SUBCUTANEOUS at 21:39

## 2020-12-08 RX ADMIN — TRAZODONE HYDROCHLORIDE 150 MG: 50 TABLET ORAL at 21:37

## 2020-12-08 RX ADMIN — GLYCOPYRROLATE AND FORMOTEROL FUMARATE 2 PUFF: 9; 4.8 AEROSOL, METERED RESPIRATORY (INHALATION) at 19:42

## 2020-12-08 RX ADMIN — INSULIN GLARGINE 70 UNITS: 100 INJECTION, SOLUTION SUBCUTANEOUS at 21:38

## 2020-12-08 RX ADMIN — HEPARIN SODIUM 5000 UNITS: 5000 INJECTION INTRAVENOUS; SUBCUTANEOUS at 15:40

## 2020-12-08 RX ADMIN — PREGABALIN 25 MG: 25 CAPSULE ORAL at 15:40

## 2020-12-08 RX ADMIN — CARVEDILOL 12.5 MG: 6.25 TABLET, FILM COATED ORAL at 18:43

## 2020-12-08 RX ADMIN — SODIUM CHLORIDE, PRESERVATIVE FREE 10 ML: 5 INJECTION INTRAVENOUS at 09:29

## 2020-12-08 RX ADMIN — DILTIAZEM HYDROCHLORIDE 180 MG: 180 CAPSULE, COATED, EXTENDED RELEASE ORAL at 09:29

## 2020-12-08 RX ADMIN — TIZANIDINE 2 MG: 4 TABLET ORAL at 21:38

## 2020-12-08 RX ADMIN — GLYCOPYRROLATE AND FORMOTEROL FUMARATE 2 PUFF: 9; 4.8 AEROSOL, METERED RESPIRATORY (INHALATION) at 08:10

## 2020-12-08 RX ADMIN — ONDANSETRON 4 MG: 2 INJECTION INTRAMUSCULAR; INTRAVENOUS at 22:17

## 2020-12-08 RX ADMIN — LOSARTAN POTASSIUM 50 MG: 25 TABLET, FILM COATED ORAL at 09:28

## 2020-12-08 RX ADMIN — TIZANIDINE 2 MG: 4 TABLET ORAL at 15:40

## 2020-12-08 ASSESSMENT — PAIN SCALES - GENERAL
PAINLEVEL_OUTOF10: 8
PAINLEVEL_OUTOF10: 6
PAINLEVEL_OUTOF10: 7
PAINLEVEL_OUTOF10: 8

## 2020-12-08 ASSESSMENT — PAIN DESCRIPTION - DESCRIPTORS: DESCRIPTORS: STABBING

## 2020-12-08 ASSESSMENT — PAIN DESCRIPTION - LOCATION
LOCATION: BACK

## 2020-12-08 ASSESSMENT — PAIN DESCRIPTION - PAIN TYPE
TYPE: CHRONIC PAIN

## 2020-12-08 ASSESSMENT — PAIN - FUNCTIONAL ASSESSMENT: PAIN_FUNCTIONAL_ASSESSMENT: PREVENTS OR INTERFERES SOME ACTIVE ACTIVITIES AND ADLS

## 2020-12-08 ASSESSMENT — PAIN DESCRIPTION - PROGRESSION: CLINICAL_PROGRESSION: NOT CHANGED

## 2020-12-08 ASSESSMENT — PAIN DESCRIPTION - ORIENTATION: ORIENTATION: LOWER

## 2020-12-08 ASSESSMENT — PAIN DESCRIPTION - FREQUENCY: FREQUENCY: CONTINUOUS

## 2020-12-08 NOTE — PROGRESS NOTES
Hospitalist Progress Note      PCP: Dipti Rios MD    Date of Admission: 12/4/2020    Chief Complaint: Bilateral lower extremity weakness, numbness, and inability to walk for 2 days. Hospital Course: 44 y.o. male who presented to Health system with above complaint. He has a history of diabetes and neuropathy. He does have lower back pain for long time. He has been feeling more numbness for last 2 days and today he could not get up and walk. He felt like he does not have leg. He fell couple of times and there is no apparent injuries. Currently he does not feel below the mid calf bilateral lower extremities. And he is able to move some extent lower extremities but unable to walk. Subjective:    Sitting on the side of the bed. Pending LP this AM. He is nervous. Nausea has resolved, no abd pain this am. CT results reviewed with him.        Medications:  Reviewed    Infusion Medications   Scheduled Medications    lidocaine  1 patch Transdermal Daily    insulin lispro  0-12 Units Subcutaneous TID WC    insulin lispro  0-6 Units Subcutaneous Nightly    aspirin  81 mg Oral Daily    carvedilol  12.5 mg Oral BID WC    clopidogrel  75 mg Oral Daily    dilTIAZem  180 mg Oral Daily    DULoxetine  30 mg Oral Daily    gabapentin  100 mg Oral TID    insulin glargine  70 Units Subcutaneous Nightly    isosorbide mononitrate  30 mg Oral Daily    linaclotide  145 mcg Oral QAM AC    losartan  50 mg Oral Daily    pantoprazole  40 mg Oral QAM AC    pravastatin  80 mg Oral Nightly    pregabalin  25 mg Oral TID    QUEtiapine  50 mg Oral QPM    glycopyrrolate-formoterol  2 puff Inhalation BID    tiZANidine  2 mg Oral TID    torsemide  100 mg Oral Daily    traZODone  150 mg Oral Nightly    sodium chloride flush  10 mL Intravenous 2 times per day    heparin (porcine)  5,000 Units Subcutaneous 3 times per day     PRN Meds: prochlorperazine, oxyCODONE-acetaminophen, calcium carbonate, albuterol sulfate HFA, heparin (porcine), sodium chloride flush, acetaminophen **OR** acetaminophen, polyethylene glycol, promethazine **OR** ondansetron      Intake/Output Summary (Last 24 hours) at 12/8/2020 1221  Last data filed at 12/8/2020 1139  Gross per 24 hour   Intake 110 ml   Output --   Net 110 ml       Physical Exam Performed:    BP (!) 179/63   Pulse 83   Temp 97.7 °F (36.5 °C) (Oral)   Resp 16   Ht 5' 9\" (1.753 m)   Wt 262 lb 5.6 oz (119 kg)   SpO2 96%   BMI 38.74 kg/m²     General appearance: No apparent distress, appears stated age and cooperative. HEENT: Pupils equal, round, and reactive to light. Conjunctivae/corneas clear. Neck: Supple, with full range of motion. No jugular venous distention. Trachea midline. Respiratory:  Normal respiratory effort. Clear to auscultation, bilaterally without Rales/Wheezes/Rhonchi. Cardiovascular: Regular rate and rhythm with normal S1/S2 without murmurs, rubs or gallops. Abdomen: Soft, non-tender, non-distended with normal bowel sounds. Musculoskeletal: No clubbing, cyanosis or edema bilaterally. Full range of motion without deformity. Skin: Skin color, texture, turgor normal.  No rashes or lesions. Neurologic:  Abnormal sensation below the knee bilaterally. 3/5 weakness BLE. Psychiatric: Alert and oriented, thought content appropriate, normal insight  Capillary Refill: Brisk,< 3 seconds   Peripheral Pulses: +2 palpable, equal bilaterally       Labs:   Recent Labs     12/06/20  0930 12/07/20  0624 12/08/20  0649   WBC 9.8 11.2* 11.2*   HGB 10.5* 10.1* 9.6*   HCT 31.7* 30.0* 28.6*    246 273     Recent Labs     12/06/20  0930 12/07/20  0624 12/08/20  0649   * 125* 126*   K 4.9 5.0 4.5   CL 91* 89* 91*   CO2 23 22 23   BUN 60* 71* 40*   CREATININE 6.3* 7.1* 5.1*   CALCIUM 9.0 9.0 9.0     No results for input(s): AST, ALT, BILIDIR, BILITOT, ALKPHOS in the last 72 hours.   Recent Labs     12/07/20  1536   INR 1.00     Recent Labs 12/06/20  1935 12/07/20  0056 12/07/20  0624   TROPONINI 0.04* 0.04* 0.04*       Urinalysis:      Lab Results   Component Value Date    NITRU Negative 06/05/2020    WBCUA 10-20 06/05/2020    BACTERIA 2+ 06/05/2020    RBCUA 0-2 06/05/2020    BLOODU TRACE-INTACT 06/05/2020    SPECGRAV 1.020 06/05/2020    GLUCOSEU 250 06/05/2020       Radiology:  CT ABDOMEN PELVIS W IV CONTRAST Additional Contrast? Radiologist Recommendation   Final Result   No acute abdominopelvic abnormality. MRI THORACIC SPINE WO CONTRAST   Final Result   No acute thoracic spine abnormality. No thoracic spinal canal or neural   foraminal stenosis. MRI CERVICAL SPINE WO CONTRAST   Final Result   No acute cervical spine abnormality. Mild degenerative spondylosis without significant cervical spinal canal   stenosis or high-grade neural foraminal stenosis. Mild left C4-C5 and right C6-C7 neural foraminal stenosis. MRI LUMBAR SPINE WO CONTRAST   Final Result   1. Mild L1-2 and L5-S1 degenerative disc height loss. 2. Moderate right L5 neural foraminal narrowing secondary to a right   foraminal L5-S1 disc protrusion. 3. Mild L1-2 spinal canal stenosis. CT ABDOMEN PELVIS WO CONTRAST   Final Result   1. No CT evidence of an acute intra-abdominal or intrapelvic process. 2.  No findings to suggest acute appendicitis; no ureter calculus or   hydronephrosis. 3. Probably reactive lymph nodes in the right upper quadrant, unchanged from   prior study. 4.  Mild extrahepatic bile duct dilatation status post cholecystectomy   typical of reservoir effect. 5.  Calcific atherosclerotic disease aorta. 6. Small, fat containing umbilical hernia. 7. Benign left adrenal adenoma requires no additional evaluation or follow-up.          IR LUMBAR PUNCTURE FOR DIAGNOSIS    (Results Pending)           Assessment/Plan:    Active Hospital Problems    Diagnosis    GBS (Guillain-Talmage syndrome) ([facility] [G61.0]    Bilateral leg weakness [R29.898]    ESRD (end stage renal disease) on dialysis ([address] [N18.6, Z99.2]    DM (diabetes mellitus), secondary, uncontrolled, w/neurologic complic ([address] [H20.77, D75.62]     Bilateral lower extremity weakness numbness - unclear etiology. Exam seems inconsistent.  - MRI shows L5-S1 disc protrusion.  - discussed with Ortho Spine - recommended MRI cervical and thoracic spine to rule out epidural abscess, given elevated ESR and CRP. This was WNL  - neurology consulted and following     ESRD on HD   - nephrology consulted and following.  - planning for HD today as he missed two HD treatments.     Chronic back pain   - continue home medication and muscle relaxant  - temp increase to Percocet from 7.5 to 10mg/325. Discussed that we will not provide this change for him at d/c since he follows with pain management .     DM2 with neuropathy  - continue Lantus and medium dose SSI. - continue gabapentin and Cymbalta. Hyponatremia   - nephrology following.  - hold Celexa.     Anemia - likely due to ESRD.     Chronic diastolic CHF - no evidence of fluid overload     COPD - stable.  Continue home inhalers     CAD -  Continue Coreg, aspirin, Plavix and Pravachol      DVT Prophylaxis: Subcutaneous Heparin  Diet: Diet NPO Effective Now Exceptions are: Ice Chips, Sips with Meds  Code Status: Full Code    PT/OT Eval Status: ordered    Dispo - likely 1-3 days inpatient    JAY Ray - CNP

## 2020-12-08 NOTE — PROGRESS NOTES
Anh Coronado  Neurology Follow-up  [facility] Neurology    Date of Service: 12/8/2020    Subjective:   CC: Follow up today regarding: lower weakness and falling. Events noted. Chart and lab reviewed. The patient is about the same. Awaiting LP. He denies any new symptoms. The same chronic leg weakness with recent worsening. No severe dysesthesia. No weakness in the arms, dysphagia or dysarthria or breathing difficulties. No headache or chest pain. Other review of system was unremarkable. ROS : A 10-12 system review obtained and updated today and is unremarkable except as mentioned  in my interval history. family history includes Alcohol Abuse in his brother; Cancer in his sister and sister; Diabetes in his mother; Heart Disease in his father, sister, and sister; Kidney Disease in his sister; Obesity in his sister and sister.     Past Medical History:   Diagnosis Date    Ambulatory dysfunction     walker for long distances, SOB with distance    Aortic stenosis     echo 2017    Arthritis     hands and hips    Asthma     Bilateral hilar adenopathy syndrome 6/3/2013    CAD (coronary artery disease)     Dr. Louann Haile [facility]) 04/19/2019    EF= 43%    CHF (congestive heart failure) (HCC)     Chronic pain     COPD (chronic obstructive pulmonary disease) ([address]     pulmonology [facility] [employer]    Depression     Diabetes mellitus ([address]     borderline    Difficult intravenous access     Emphysema of lung ([address]     ESRD (end stage renal disease) on dialysis ([address]     MWF    Fear of needles     Gastric ulcer     GERD (gastroesophageal reflux disease)     Heart valve problem     bicuspic valve    Hemodialysis patient ([address]     History of spinal fracture     work incident    Hx of blood clots     Bilateral lower extremities; stents in place    Hyperlipidemia     Hypertension     MI (myocardial infarction) ([address] 2019    has had 9 MIs. 2019 was the last    extended release capsule 180 mg  180 mg Oral Daily Deonna Lewis MD   180 mg at 12/06/20 0849    DULoxetine (CYMBALTA) extended release capsule 30 mg  30 mg Oral Daily Deonna Lewis MD   30 mg at 12/06/20 0848    gabapentin (NEURONTIN) capsule 100 mg  100 mg Oral TID Deonna Lewis MD   100 mg at 12/07/20 2300    insulin glargine (LANTUS) injection vial 70 Units  70 Units Subcutaneous Nightly Deonna Lewis MD   70 Units at 12/07/20 2128    isosorbide mononitrate (IMDUR) extended release tablet 30 mg  30 mg Oral Daily Deonna Lewis MD   30 mg at 12/06/20 0848    linaclotide (LINZESS) capsule 145 mcg  145 mcg Oral QAM AC Deonna Lewis MD        losartan (COZAAR) tablet 50 mg  50 mg Oral Daily Deonna Lewis MD   50 mg at 12/06/20 0848    pantoprazole (PROTONIX) tablet 40 mg  40 mg Oral QAM AC Deonna Lewis MD   40 mg at 12/07/20 0558    pravastatin (PRAVACHOL) tablet 80 mg  80 mg Oral Nightly Deonna Lewis MD   80 mg at 12/07/20 2300    pregabalin (LYRICA) capsule 25 mg  25 mg Oral TID Deonna Lewis MD   25 mg at 12/07/20 2300    QUEtiapine (SEROQUEL) tablet 50 mg  50 mg Oral QPM Deonna Lewis MD   50 mg at 12/06/20 1952    glycopyrrolate-formoterol (BEVESPI) 9-4.8 MCG/ACT inhaler 2 puff  2 puff Inhalation BID Deonna Lewis MD   2 puff at 12/08/20 0810    tiZANidine (ZANAFLEX) tablet 2 mg  2 mg Oral TID Deonna Lewis MD   2 mg at 12/07/20 2300    torsemide (DEMADEX) tablet 100 mg  100 mg Oral Daily Deonna Lewis MD   100 mg at 12/06/20 0848    traZODone (DESYREL) tablet 150 mg  150 mg Oral Nightly Deonna Lewis MD   150 mg at 12/07/20 2301    sodium chloride flush 0.9 % injection 10 mL  10 mL Intravenous 2 times per day Deonna Lewis MD   10 mL at 12/07/20 2128    sodium chloride flush 0.9 % injection 10 mL  10 mL Intravenous PRN Deonna Lewis MD        acetaminophen (TYLENOL) tablet 650 mg  650 mg Oral Q6H PRN Deonna Lewis MD Or    acetaminophen (TYLENOL) suppository 650 mg  650 mg Rectal Q6H PRN Ketty Carney MD        polyethylene glycol (GLYCOLAX) packet 17 g  17 g Oral Daily PRN Ketty Carney MD        promethazine (PHENERGAN) tablet 12.5 mg  12.5 mg Oral Q6H PRN Ketty Carney MD   12.5 mg at 12/07/20 1653    Or    ondansetron (ZOFRAN) injection 4 mg  4 mg Intravenous Q6H PRN Ketty Carney MD   4 mg at 12/07/20 0909    heparin (porcine) injection 5,000 Units  5,000 Units Subcutaneous 3 times per day Ketty Carney MD   5,000 Units at 12/07/20 2301     Allergies   Allergen Reactions    Morphine Nausea And Vomiting      reports that he has been smoking cigarettes. He has a 33.00 pack-year smoking history. He has never used smokeless tobacco. He reports previous alcohol use. He reports that he does not use drugs. Objective:  Exam:   Constitutional:   Vitals:    12/08/20 0329 12/08/20 0332 12/08/20 0811 12/08/20 0915   BP: (!) 142/75      Pulse: 91   92   Resp: 12 12 16 16   Temp: 98.4 °F (36.9 °C)   98.3 °F (36.8 °C)   TempSrc:    Oral   SpO2: 94%  93% 93%   Weight:       Height:         General appearance:  Normal development and appear in no acute distress. Eye: No icterus. Neck: supple  Cardiovascular:  No lower leg edema with good pulsation. Mental Status:   Oriented to person, place, problem, and time. Memory: Good immediate recall. Intact remote memory  Normal attention span and concentration. Language: intact naming, repeating and fluency   Good fund of Knowledge. Cranial Nerves:   II: Visual fields: Full. Pupils: equal, round, reactive to light  III,IV,VI: Extra Ocular Movements are intact. No nystagmus  V: Facial sensation is intact  VII: Facial strength and movements: intact and symmetric  IX: Palate elevation is symmetric  XI: Shoulder shrug is intact  XII: Tongue movements are normal  Musculoskeletal: No change compared to yesterday.   The same inconsistent leg weakness but at least 4/5. No weakness in the arms  Tone: Normal tone. Reflexes are symmetric  No tremors on coordination  Sensory is unremarkable, no sensory loss  Gait cannot be tested. Data:  LABS:   Lab Results   Component Value Date     12/08/2020    K 4.5 12/08/2020    CL 91 12/08/2020    CO2 23 12/08/2020    BUN 40 12/08/2020    CREATININE 5.1 12/08/2020    GFRAA 14 12/08/2020    GFRAA >60 05/17/2013    LABGLOM 12 12/08/2020    GLUCOSE 130 12/08/2020    PHOS 3.3 06/10/2020    MG 2.10 04/26/2020    CALCIUM 9.0 12/08/2020     Lab Results   Component Value Date    WBC 11.2 12/08/2020    RBC 3.25 12/08/2020    HGB 9.6 12/08/2020    HCT 28.6 12/08/2020    MCV 88.1 12/08/2020    RDW 13.2 12/08/2020     12/08/2020     Lab Results   Component Value Date    INR 1.00 12/07/2020    PROTIME 11.6 12/07/2020       Neuroimaging was independently reviewed by me and discussed results with the patient  I reviewed blood testing and other test results and discussed results with the patient      Impression: No changes. Acute lower extremity weakness with paresthesia and recurrent falling. About the same. Could be combination of chronic diabetic polyneuropathy and chronic lumbar DJD. So far his exam and history are not consistent. We will proceed with LP under IR to rule out possibility of acute emanating polyneuropathy although seems less likely to me. This will be difficult diagnosis to make giving underlying diabetic polyneuropathy.   Diabetes, poorly controlled with underlying polyneuropathy  Hyponatremia  Hyperlipidemia  Obesity  End-stage renal disease      Recommendation     LP under IR  Get PT and OT to assess safety before discharge  Continue current conservative measures  Sugar control on insulin sliding scale  Continue current blood pressure medications  DVT and GI prophylaxis  Telemetry  ASA  Statin  Continue scheduled dialysis  DC planning depending on safety and evaluation from PT and OT  I am not considering starting IVIG or other immunosuppressive agents at this point  Discussed with his nurse  MDM: Jose Alfredo Issa MD   [phone]      This dictation was generated by voice recognition computer software. Although all attempts are made to edit the dictation for accuracy, there may be errors in the transcription that are not intended.

## 2020-12-08 NOTE — PROGRESS NOTES
12/08/20 0332   NIV Type   Skin Protection for O2 Device No  (pt states that he does not want mepilex)   Equipment Type v60   Mode CPAP   Mask Type Full face mask   Mask Size Medium   Settings/Measurements   CPAP/EPAP 10 cmH2O   Resp 12   FiO2  21 %   Vt Exhaled 503 mL   Mask Leak (lpm) 50 lpm  (best  seal obtained)   Comfort Level Good   Using Accessory Muscles No   SpO2 88   Alarm Settings   Alarms On Y   Press Low Alarm 6 cmH2O   High Pressure Alarm 25 cmH2O   Apnea (secs) 20 secs   Resp Rate Low Alarm 6   High Respiratory Rate 40 br/min

## 2020-12-08 NOTE — PROGRESS NOTES
Physical Therapy    Facility/Department: [facility] - MED SURG/ORTHO  Initial Assessment/Treatment Session    NAME: Candace Garcia  : 1970  MRN: [id number]    Date of Service: 2020    Discharge Recommendations:  Subacute/Skilled Nursing Facility   PT Equipment Recommendations  Equipment Needed: No  Other: Defer to facility    Assessment   Body structures, Functions, Activity limitations: Decreased functional mobility ; Decreased strength;Decreased balance;Decreased sensation;Decreased endurance  Assessment: Pt is a 44year old male with PMH including CVA, HTN, HLD, DM with polyneuropathy, COPD, CHF, CAD, OA, ESRD and chronic back pain who presented  with BLE weakness, numbness and inability to walk x 2 days with multiple falls at home. MRI L spine demonstrated mod stenosis L1-2 and mild L5 disc protrusion. (-) for epidural abscess. Upon evaluation, pt presents with above deficits most notably impaired sensation from L4-S1 dermatomes, impaired coordination and proprioception. Pt has fair strength in BLEs but poor endurance, reports legs giving out at home as cause for fall. Pt reports he feels like his legs are not there but regularly uses them without assistance to roll over, perform bed mobility and even stand to use urinal without assistance. There were also inconsistencies in his reports of numbness, for example initially stated he could feel light touch at his left calf, upon random retesting reported he could not feel it at all. Pt ultimately required Ax2 to transfer bed>chair with use of SW. Due to concern for buckling, required use of Sera Plus with forearm and sling support to ambulate 5ft before needing to sit down secondary to fatigue. Pt will benefit from continued skilled PT while admitted and at discharge to progress functional mobility and safety. Rec SNF. Will continue to follow. Treatment Diagnosis: Impaired sensation and mobility.   Prognosis: Fair  Decision Making: Medium Complexity  PT Education: Goals;PT Role;Plan of Care;General Safety;Transfer Training;Gait Training;Functional Mobility Training;Disease Specific Education  Patient Education: Pt was educated regarding safety with OOB mobility secondary to high fall risk - verbalized understanding. REQUIRES PT FOLLOW UP: Yes  Activity Tolerance  Activity Tolerance: Patient limited by fatigue;Patient limited by endurance  Activity Tolerance: BP initially elevated after activity, improved with rest. No c/o chest pain, SOB or dizziness. Patient Diagnosis(es): The primary encounter diagnosis was Bilateral leg weakness. Diagnoses of Numbness and tingling of both legs and ESRD (end stage renal disease) on dialysis [facility]) were also pertinent to this visit. has a past medical history of Ambulatory dysfunction, Aortic stenosis, Arthritis, Asthma, Bilateral hilar adenopathy syndrome, CAD (coronary artery disease), Cardiomyopathy ([address] CHF (congestive heart failure) ([address] Chronic pain, COPD (chronic obstructive pulmonary disease) ([address] Depression, Diabetes mellitus ([address] Difficult intravenous access, Emphysema of lung ([address] ESRD (end stage renal disease) on dialysis ([address] Fear of needles, Gastric ulcer, GERD (gastroesophageal reflux disease), Heart valve problem, Hemodialysis patient ([address] History of spinal fracture, Hx of blood clots, Hyperlipidemia, Hypertension, MI (myocardial infarction) ([address] Neuromuscular disorder ([address] Numbness and tingling in left arm, Pneumonia, PONV (postoperative nausea and vomiting), Prolonged emergence from general anesthesia, Sleep apnea, Stroke ([address] TIA (transient ischemic attack), and Unspecified diseases of blood and blood-forming organs. has a past surgical history that includes Tonsillectomy; cyst removal (08/14/2013); Colonoscopy; Coronary angioplasty with stent (05/26/15); vascular surgery (aprx 2 years ago); Colonoscopy (2/29/2015);  Upper gastrointestinal endoscopy (01/06/2016); Upper gastrointestinal endoscopy (01/29/2017); other surgical history (02/01/2017); Dialysis fistula creation (Left, 10/30/2017); Diagnostic Cardiac Cath Lab Procedure; other surgical history (2018); other surgical history (Bilateral, 06/26/2018); pr lap insertion tunneled intraperitoneal catheter (N/A, 9/21/2018); other surgical history (Right, 09/2018); Dialysis fistula creation (Left, 3/27/2019); other surgical history (05/28/2019); Endoscopy, colon, diagnostic; Catheter Removal (Right, 7/2/2019); and Aortic valve replacement (N/A, 10/15/2019). Restrictions  Restrictions/Precautions  Restrictions/Precautions: Fall Risk  Required Braces or Orthoses?: No  Position Activity Restriction  Other position/activity restrictions: Up with assist  Vision/Hearing  Vision: Impaired  Vision Exceptions: Wears glasses for reading  Hearing: Within functional limits     Subjective  General  Chart Reviewed: Yes  Patient assessed for rehabilitation services?: Yes  Response To Previous Treatment: Not applicable  Family / Caregiver Present: No  Referring Practitioner: JAY Elder CNP  Referral Date : 12/08/20  Diagnosis: Weakness of both legs. Follows Commands: Within Functional Limits  General Comment  Comments: RN cleared for therapy  Subjective  Subjective: Pt lying in bed upon arrival, agreeable to PT/OT evaluation. Pain Screening  Patient Currently in Pain: Yes  Pain Assessment  Pain Assessment: 0-10  Pain Level: 7  Pain Type: Chronic pain  Pain Location: Back  Pain Orientation: Lower  Pain Descriptors: Stabbing  Pain Frequency: Continuous  Pain Onset: On-going  Clinical Progression: Not changed  Functional Pain Assessment: Prevents or interferes some active activities and ADLs  Non-Pharmaceutical Pain Intervention(s): Ambulation/Increased Activity; Distraction; Emotional support;Repositioned  Response to Pain Intervention: Patient Satisfied  Vital Signs  BP: (!) 147/73  BP Location: Right lower arm  Patient Position: Sitting  Patient Currently in Pain: Yes  Pre Treatment Pain Screening  Intervention List: Patient able to continue with treatment;Nurse called to administer meds;Nurse/physician notified       Social/Functional History  Social/Functional History  Lives With: Spouse  Type of Home: Trailer  Home Layout: One level  Home Access: Ramped entrance  Bathroom Shower/Tub: Walk-in shower, Shower chair with back  Bathroom Toilet: Standard  Bathroom Equipment: Grab bars around toilet, Shower chair  Home Equipment: Electric scooter, 4 wheeled walker  ADL Assistance: [address] [place] [address]: Independent  Homemaking Responsibilities: No(pt states having HHA 3x/week)  Ambulation Assistance: Independent  Transfer Assistance: Independent  Active : No  Additional Comments: Pt reports having over 30 falls the last 3 months       Objective     Observation/Palpation  Posture: Fair    PROM RLE (degrees)  RLE PROM: WFL  AROM RLE (degrees)  RLE AROM: WFL  PROM LLE (degrees)  LLE PROM: WFL  AROM LLE (degrees)  LLE AROM : WFL  Strength RLE  Strength RLE: WFL  Comment: On MMT testing, ankle dorsiflexors 4/5, knee extensors 4+/5, hip flexors 4/5  Strength LLE  Strength LLE: WFL  Comment: On MMT testing, ankle dorsiflexors 4/5, knee extensors 4+/5, hip flexors 4/5  Tone RLE  RLE Tone: Normotonic(No clonus noted)  Tone LLE  LLE Tone: Normotonic(No clonus noted)  Motor Control  Gross Motor?: WFL  Coordination  Rapid Alternating Movements: Dysdiadokinesia  Heel to [place]: Abnormal(Difficulty performing secondary to back pain.)  Sensation  Overall Sensation Status: Impaired  Light Touch: Partial deficits in the RUE;Partial deficits in the LUE;Severe deficits in the LLE; Severe deficits in the RLE(N/T in BUE fingertips. Pt reports BLE sensation deficits at baseline but worsened at this time. Upon testing, unable to sense light touch and deep pressure L4-S1 dermatomes.  Impaired proprioception tested at big toe bilaterally.)  Bed mobility  Rolling to Left: Supervision  Rolling to Right: Supervision  Supine to Sit: Supervision  Sit to Supine: Supervision  Scooting: Stand by assistance  Comment: Use of side rails with HOB slightly elevated. Transfers  Sit to Stand: Moderate Assistance;Minimal Assistance  Stand to sit: Moderate Assistance;Minimal Assistance  Bed to Chair: 2 Person Assistance; Moderate assistance;Minimal assistance  Comment: Initial sit<>stand transfer from EOB up to RW required mod A x 2. Stand stepping transfer to L with use of SW and mod A x 2 with CGA at B knees in case of buckling. Pt maintains wide ALANNAH throughout. Second sit<>stand trial pt performed prior to PT/OT being ready and ultimately required min A x 1, then able to take a couple of steps without AD with min A x 2. Ambulation  Ambulation?: Yes  Ambulation 1  Surface: level tile  Device: (Use of SeraPlus with foot plate removed.)  Assistance: Maximum assistance;2 Person assistance  Quality of Gait: Utilized SeraPlus for forearm support, sling to maintain hip extension, and knee plate for B knee block as needed. Removed foot plate so pt was able to take a few steps with chair follow for max safety. Required min A from PT at hips providing balance and weight shift, OT directing SeraPlus. Pt demonstrates continued wide ALANNAH, ataxic step asking \"is my foot on the ground? \" throughout. No overt buckling noted, pt limited by fatigue requesting to sit. Distance: 5ft  Stairs/Curb  Stairs?: No     Balance  Posture: Fair  Sitting - Static: Good  Sitting - Dynamic: Good  Standing - Static: Fair;-  Standing - Dynamic: Poor  Comments: Pt reports he has been able to stand with BLE braced on EOB while utilizing urinal but has \"fallen\" twice back into the bed attempting to do so. Educated regarding need for assistance any time pt is attempting to stand/get out of bed.         Plan   Plan  Times per week: 3-5x/wk  Times per day: Daily  Current Treatment Recommendations: Strengthening, Balance Training, Functional Mobility Training, Gait Training, Endurance Training, Safety Education & Training, Patient/Caregiver Education & Training  Safety Devices  Type of devices: All fall risk precautions in place, Bed alarm in place, Gait belt, Nurse notified, Left in bed, Patient at risk for falls  Restraints  Initially in place: No    AM-PAC Score     AM-PAC Inpatient Mobility without Stair Climbing Raw Score : 12 (12/08/20 1708)  AM-PAC Inpatient without Stair Climbing T-Scale Score : 37.26 (12/08/20 1708)  Mobility Inpatient CMS 0-100% Score: 63.03 (12/08/20 1708)  Mobility Inpatient without Stair CMS G-Code Modifier : CL (12/08/20 1708)       Goals  Short term goals  Time Frame for Short term goals: 1 week, 12/15/20 unless otherwise noted. Short term goal 1: Pt will perform bed mobility with independence  Short term goal 2: Pt will perform sit<>stand transfer with min A  Short term goal 3: Pt will perform bed<>chair transfer with LRAD and min A  Short term goal 4: Pt will ambulate 20ft with LRAD and min A  Short term goal 5: By 12/11 pt will tolerate x 10-15 reps BLE exercise to assist with functional transfers and ambulation. Patient Goals   Patient goals : \"To feel my legs again. \"       Therapy Time   Individual Concurrent Group Co-treatment   Time In 4891         Time Out 1619         Minutes 41         Timed Code Treatment Minutes: 31 Minutes(10 minute evaluation)       Dain Tamayo PT     If pt is unable to be seen after this session, please let this note serve as discharge summary. Please see case management note for discharge disposition. Thank you.

## 2020-12-08 NOTE — PROGRESS NOTES
Nephrology Progress Note   [email or web address]      This patient is a 44year old male whom we are following for ESRD. Subjective: The patient was seen and examined. No acute events overnight. Family History: No family at bedside  ROS: No SOB or chest pain      Vitals:  /72   Pulse 82   Temp 97.7 °F (36.5 °C) (Oral)   Resp 16   Ht 5' 9\" (1.753 m)   Wt 262 lb 5.6 oz (119 kg)   SpO2 93%   BMI 38.74 kg/m²   I/O last 3 completed shifts: In: 100 [P.O.:100]  Out: 4026   I/O this shift:  In: 10 [I.V.:10]  Out: 200 [Urine:200]    Physical Exam:  Physical Exam  Vitals signs reviewed. Constitutional:       General: He is not in acute distress. Appearance: Normal appearance. HENT:      Head: Normocephalic and atraumatic. Eyes:      General: No scleral icterus. Conjunctiva/sclera: Conjunctivae normal.   Cardiovascular:      Rate and Rhythm: Normal rate. Heart sounds: No friction rub. Pulmonary:      Effort: Pulmonary effort is normal. No respiratory distress. Breath sounds: No wheezing. Abdominal:      General: Bowel sounds are normal. There is no distension. Tenderness: There is no abdominal tenderness. Musculoskeletal:      Right lower leg: No edema. Left lower leg: No edema. Neurological:      Mental Status: He is alert.        Access: [facility]      Medications:   lidocaine  1 patch Transdermal Daily    insulin lispro  0-12 Units Subcutaneous TID WC    insulin lispro  0-6 Units Subcutaneous Nightly    aspirin  81 mg Oral Daily    carvedilol  12.5 mg Oral BID WC    [Held by provider] clopidogrel  75 mg Oral Daily    dilTIAZem  180 mg Oral Daily    DULoxetine  30 mg Oral Daily    gabapentin  100 mg Oral TID    insulin glargine  70 Units Subcutaneous Nightly    isosorbide mononitrate  30 mg Oral Daily    linaclotide  145 mcg Oral QAM AC    losartan  50 mg Oral Daily    pantoprazole  40 mg Oral QAM AC    pravastatin  80 mg Oral Nightly    pregabalin  25 mg

## 2020-12-08 NOTE — PROGRESS NOTES
Pt refusing LP unless he can be \"knocked out\" for the procedure. Consent not obtained at this time.

## 2020-12-08 NOTE — PROGRESS NOTES
Occupational Therapy   Occupational Therapy Initial Assessment  Date: 2020   Patient Name: Pramod Melton  MRN: [id number]     : 1970    Date of Service: 2020    Discharge Recommendations:  Subacute/Skilled Nursing Facility       Assessment   Performance deficits / Impairments: Decreased functional mobility ; Decreased ADL status; Decreased sensation;Decreased balance;Decreased strength;Decreased safe awareness;Decreased cognition  Assessment: Pt presents to [facility] with increased low back pain and numbness/tingling of BLEs. Pt reports he has had low back pain for several years but has experienced intense LE numbness, he reports not being able to feel the ground beneath his feet. Pt demos poor proprioceptive sense of BLEs and which is effecting pt's gait. Trialed pt using sabine plus and pt able to manage short distance with use of sabine steady. Pt able to bear weight through BLEs and maintain upright position with use of sabine plus. Despite pt demo'ng signs of BLE numbness and weakness, pt does demo inconsistencies during various tests. Pt will require further evaluation while in hospital, but at this time pt will benefit from increased OT skilled therapy in order to achieve therapy goals  Treatment Diagnosis: Weaknes  Prognosis: Fair  Decision Making: Medium Complexity  OT Education: OT Role;ADL Adaptive Strategies;Transfer Training;Family Education;Orientation  Activity Tolerance  Activity Tolerance: Patient Tolerated treatment well  Activity Tolerance: Pt tolerated treatment  Safety Devices  Safety Devices in place: Yes  Type of devices: Left in bed;Nurse notified;Call light within reach; Patient at risk for falls; Bed alarm in place           Patient Diagnosis(es): The primary encounter diagnosis was Bilateral leg weakness. Diagnoses of Numbness and tingling of both legs and ESRD (end stage renal disease) on dialysis [facility]) were also pertinent to this visit.      has a past medical history of Ambulatory dysfunction, Aortic stenosis, Arthritis, Asthma, Bilateral hilar adenopathy syndrome, CAD (coronary artery disease), Cardiomyopathy ([address] CHF (congestive heart failure) ([address] Chronic pain, COPD (chronic obstructive pulmonary disease) ([address] Depression, Diabetes mellitus ([address] Difficult intravenous access, Emphysema of lung ([address] ESRD (end stage renal disease) on dialysis ([address] Fear of needles, Gastric ulcer, GERD (gastroesophageal reflux disease), Heart valve problem, Hemodialysis patient ([address] History of spinal fracture, Hx of blood clots, Hyperlipidemia, Hypertension, MI (myocardial infarction) ([address] Neuromuscular disorder ([address] Numbness and tingling in left arm, Pneumonia, PONV (postoperative nausea and vomiting), Prolonged emergence from general anesthesia, Sleep apnea, Stroke ([address] TIA (transient ischemic attack), and Unspecified diseases of blood and blood-forming organs. has a past surgical history that includes Tonsillectomy; cyst removal (08/14/2013); Colonoscopy; Coronary angioplasty with stent (05/26/15); vascular surgery (aprx 2 years ago); Colonoscopy (2/29/2015); Upper gastrointestinal endoscopy (01/06/2016); Upper gastrointestinal endoscopy (01/29/2017); other surgical history (02/01/2017); Dialysis fistula creation (Left, 10/30/2017); Diagnostic Cardiac Cath Lab Procedure; other surgical history (2018); other surgical history (Bilateral, 06/26/2018); pr lap insertion tunneled intraperitoneal catheter (N/A, 9/21/2018); other surgical history (Right, 09/2018); Dialysis fistula creation (Left, 3/27/2019); other surgical history (05/28/2019); Endoscopy, colon, diagnostic; Catheter Removal (Right, 7/2/2019); and Aortic valve replacement (N/A, 10/15/2019). Treatment Diagnosis: Weaknes      Restrictions  Restrictions/Precautions  Restrictions/Precautions: Fall Risk  Required Braces or Orthoses?: No  Position Activity Restriction  Other position/activity restrictions:  Up with assist    Subjective   General  Patient assessed for rehabilitation services?: Yes  Pain Assessment  Pain Assessment: Respiratory Rate >10(asleep)  Pain Level: 8  Response to Pain Intervention: Asleep with RR greater than 10  Social/Functional History  Social/Functional History  Lives With: Spouse  Type of Home: Trailer  Home Layout: One level  Home Access: Ramped entrance  Bathroom Shower/Tub: Walk-in shower, Shower chair with back  Bathroom Toilet: Standard  Bathroom Equipment: Grab bars around toilet, Shower chair  Home Equipment: Electric scooter, 4 wheeled walker  ADL Assistance: [address] [place] [address]: Independent  Homemaking Responsibilities: No(pt states having HHA 3x/week)  Ambulation Assistance: Independent  Transfer Assistance: Independent  Active : No  Additional Comments: Pt reports having over 30 falls the last 3 months       Objective   Vision: Impaired  Vision Exceptions: Wears glasses for reading  Hearing: Within functional limits    Orientation  Overall Orientation Status: Within Functional Limits     Balance  Sitting Balance: Supervision  Standing Balance: Minimal assistance(Ax2 in order to manage standing, transfers)  Standing Balance  Time: 2-3 minutes  Activity: With use of sabine plus.  Ambulated several steps,  Comment: Pt requires cues for reassurance that BLEs are touching floor due to numbness/tinlging of BLE  Functional Mobility  Functional - Mobility Device: Other(Sabine Plus)  Activity: Other(ambultion within room)  Assist Level: Dependent/Total  Functional Mobility Comments: with use of sabine plus in order to maintain upright positioning and standing  ADL  Feeding: Independent  Toileting: Supervision(use of urinal)  Tone RUE  RUE Tone: Normotonic  Tone LUE  LUE Tone: Normotonic     Bed mobility  Rolling to Left: Supervision(with use of bed rails)  Rolling to Right: Supervision(with use of bedrails)  Supine to Sit: Supervision  Sit to Supine: Supervision  Scooting: Stand by assistance  Transfers  Stand Pivot Transfers: 2 Person assistance;Contact guard assistance;Minimal assistance  Sit to stand: Contact guard assistance;Minimal assistance;2 Person assistance  Stand to sit: Contact guard assistance;Minimal assistance;2 Person assistance  Transfer Comments: Pt fluctuating assistance with CGA-MIN A x 2     Cognition  Overall Cognitive Status: WFL        Sensation  Overall Sensation Status: Impaired(nubmness/tingling BLEs/BUEs)        LUE PROM (degrees)  LUE PROM: WFL  LUE AROM (degrees)  LUE AROM : WFL  Left Hand PROM (degrees)  Left Hand PROM: WFL  Left Hand AROM (degrees)  Left Hand AROM: WFL  RUE PROM (degrees)  RUE PROM: WFL  RUE AROM (degrees)  RUE AROM : WFL  Right Hand PROM (degrees)  Right Hand PROM: WFL  Right Hand AROM (degrees)  Right Hand AROM: WFL  LUE Strength  Gross LUE Strength: WFL  RUE Strength  Gross RUE Strength: WFL                   Plan   Plan  Times per week: 3-5x  Current Treatment Recommendations: Strengthening, Balance Training, Functional Mobility Training, Endurance Training, Safety Education & Training, Neuromuscular Re-education, Self-Care / ADL      AM-PAC Score        AM-[facility] Inpatient Daily Activity Raw Score: 17 (12/08/20 1647)  AM-PAC Inpatient ADL T-Scale Score : 37.26 (12/08/20 1647)  ADL Inpatient CMS 0-100% Score: 50.11 (12/08/20 1647)  ADL Inpatient CMS G-Code Modifier : CK (12/08/20 1647)    Goals  Short term goals  Time Frame for Short term goals: 7 days 12/15/20  Short term goal 1: Pt will complete toilet transfer with MIN A  Short term goal 2: Pt will complete LB dressing with MIN A with use of AE as needed  Short term goal 3: Pt will complete short house hold distacne with RW with MIN A  Patient Goals   Patient goals :  Will complete ADLs with independent       Therapy Time   Individual Concurrent Group Co-treatment   Time In 1538         Time Out 1620         Minutes 42         Timed Code Treatment Minutes: 32 Minutes(10 minutes for eval) [facility] Misael, OT

## 2020-12-08 NOTE — PROGRESS NOTES
RADIOLOGY:  Patient brought to the Radiology Department for scheduled lumbar puncture. Patient's comorbidities combined with recent Plavix administration takes low risk procedure/lumbar puncture to high risk category for bleeding. With this in mind, the procedure was placed on hold.

## 2020-12-08 NOTE — CARE COORDINATION
CM spoke to [facility] and updated that, pt unable to have LP today. Per Radiology,\"Patient brought to the Radiology Department for scheduled lumbar puncture. Patient's comorbidities combined with recent Plavix administration takes low risk procedure/lumbar puncture to high risk category for bleeding. With this in mind, the procedure was placed on hold. \"  RN is reaching out to BRIANDA Marin and Neuro. Also discussed with RN getting PT/OT eval and treat to help with DCP.  CM following-Daxa Pastrana RN

## 2020-12-09 LAB
ANION GAP SERPL CALCULATED.3IONS-SCNC: 15 MMOL/L (ref 3–16)
BASOPHILS ABSOLUTE: 0.1 K/UL (ref 0–0.2)
BASOPHILS RELATIVE PERCENT: 0.5 %
BUN BLDV-MCNC: 54 MG/DL (ref 7–20)
CALCIUM SERPL-MCNC: 8.7 MG/DL (ref 8.3–10.6)
CHLORIDE BLD-SCNC: 90 MMOL/L (ref 99–110)
CO2: 21 MMOL/L (ref 21–32)
CREAT SERPL-MCNC: 7.2 MG/DL (ref 0.9–1.3)
EOSINOPHILS ABSOLUTE: 0.4 K/UL (ref 0–0.6)
EOSINOPHILS RELATIVE PERCENT: 3.1 %
GFR AFRICAN AMERICAN: 10
GFR NON-AFRICAN AMERICAN: 8
GLUCOSE BLD-MCNC: 103 MG/DL (ref 70–99)
GLUCOSE BLD-MCNC: 142 MG/DL (ref 70–99)
GLUCOSE BLD-MCNC: 192 MG/DL (ref 70–99)
GLUCOSE BLD-MCNC: 227 MG/DL (ref 70–99)
HCT VFR BLD CALC: 29.1 % (ref 40.5–52.5)
HEMOGLOBIN: 9.7 G/DL (ref 13.5–17.5)
LYMPHOCYTES ABSOLUTE: 1.2 K/UL (ref 1–5.1)
LYMPHOCYTES RELATIVE PERCENT: 9.1 %
MCH RBC QN AUTO: 29.4 PG (ref 26–34)
MCHC RBC AUTO-ENTMCNC: 33.3 G/DL (ref 31–36)
MCV RBC AUTO: 88.4 FL (ref 80–100)
MONOCYTES ABSOLUTE: 0.9 K/UL (ref 0–1.3)
MONOCYTES RELATIVE PERCENT: 7.1 %
NEUTROPHILS ABSOLUTE: 10.2 K/UL (ref 1.7–7.7)
NEUTROPHILS RELATIVE PERCENT: 80.2 %
PDW BLD-RTO: 13.2 % (ref 12.4–15.4)
PERFORMED ON: ABNORMAL
PLATELET # BLD: 285 K/UL (ref 135–450)
PMV BLD AUTO: 7.2 FL (ref 5–10.5)
POTASSIUM REFLEX MAGNESIUM: 4.9 MMOL/L (ref 3.5–5.1)
RBC # BLD: 3.29 M/UL (ref 4.2–5.9)
SODIUM BLD-SCNC: 126 MMOL/L (ref 136–145)
WBC # BLD: 12.7 K/UL (ref 4–11)

## 2020-12-09 PROCEDURE — 6370000000 HC RX 637 (ALT 250 FOR IP): Performed by: NURSE PRACTITIONER

## 2020-12-09 PROCEDURE — 6370000000 HC RX 637 (ALT 250 FOR IP): Performed by: INTERNAL MEDICINE

## 2020-12-09 PROCEDURE — 1200000000 HC SEMI PRIVATE

## 2020-12-09 PROCEDURE — 2580000003 HC RX 258: Performed by: INTERNAL MEDICINE

## 2020-12-09 PROCEDURE — 97530 THERAPEUTIC ACTIVITIES: CPT

## 2020-12-09 PROCEDURE — 94761 N-INVAS EAR/PLS OXIMETRY MLT: CPT

## 2020-12-09 PROCEDURE — 90935 HEMODIALYSIS ONE EVALUATION: CPT

## 2020-12-09 PROCEDURE — 36415 COLL VENOUS BLD VENIPUNCTURE: CPT

## 2020-12-09 PROCEDURE — 80048 BASIC METABOLIC PNL TOTAL CA: CPT

## 2020-12-09 PROCEDURE — 6360000002 HC RX W HCPCS: Performed by: INTERNAL MEDICINE

## 2020-12-09 PROCEDURE — 85025 COMPLETE CBC W/AUTO DIFF WBC: CPT

## 2020-12-09 PROCEDURE — 2700000000 HC OXYGEN THERAPY PER DAY

## 2020-12-09 PROCEDURE — 99233 SBSQ HOSP IP/OBS HIGH 50: CPT | Performed by: PSYCHIATRY & NEUROLOGY

## 2020-12-09 PROCEDURE — 97110 THERAPEUTIC EXERCISES: CPT

## 2020-12-09 PROCEDURE — 94640 AIRWAY INHALATION TREATMENT: CPT

## 2020-12-09 PROCEDURE — 94660 CPAP INITIATION&MGMT: CPT

## 2020-12-09 PROCEDURE — 97116 GAIT TRAINING THERAPY: CPT

## 2020-12-09 RX ADMIN — DULOXETINE HYDROCHLORIDE 30 MG: 30 CAPSULE, DELAYED RELEASE ORAL at 13:52

## 2020-12-09 RX ADMIN — HEPARIN SODIUM 5000 UNITS: 5000 INJECTION INTRAVENOUS; SUBCUTANEOUS at 13:55

## 2020-12-09 RX ADMIN — SODIUM CHLORIDE, PRESERVATIVE FREE 10 ML: 5 INJECTION INTRAVENOUS at 00:00

## 2020-12-09 RX ADMIN — PREGABALIN 25 MG: 25 CAPSULE ORAL at 20:12

## 2020-12-09 RX ADMIN — GABAPENTIN 100 MG: 100 CAPSULE ORAL at 20:13

## 2020-12-09 RX ADMIN — ANTACID TABLETS 500 MG: 500 TABLET, CHEWABLE ORAL at 08:18

## 2020-12-09 RX ADMIN — TORSEMIDE 100 MG: 100 TABLET ORAL at 13:52

## 2020-12-09 RX ADMIN — INSULIN GLARGINE 70 UNITS: 100 INJECTION, SOLUTION SUBCUTANEOUS at 21:17

## 2020-12-09 RX ADMIN — ONDANSETRON 4 MG: 2 INJECTION INTRAMUSCULAR; INTRAVENOUS at 08:28

## 2020-12-09 RX ADMIN — SODIUM CHLORIDE, PRESERVATIVE FREE 10 ML: 5 INJECTION INTRAVENOUS at 20:14

## 2020-12-09 RX ADMIN — INSULIN LISPRO 2 UNITS: 100 INJECTION, SOLUTION INTRAVENOUS; SUBCUTANEOUS at 17:09

## 2020-12-09 RX ADMIN — OXYCODONE HYDROCHLORIDE AND ACETAMINOPHEN 1 TABLET: 10; 325 TABLET ORAL at 20:13

## 2020-12-09 RX ADMIN — TRAZODONE HYDROCHLORIDE 150 MG: 50 TABLET ORAL at 21:17

## 2020-12-09 RX ADMIN — DILTIAZEM HYDROCHLORIDE 180 MG: 180 CAPSULE, COATED, EXTENDED RELEASE ORAL at 13:52

## 2020-12-09 RX ADMIN — GABAPENTIN 100 MG: 100 CAPSULE ORAL at 13:52

## 2020-12-09 RX ADMIN — PANTOPRAZOLE SODIUM 40 MG: 40 TABLET, DELAYED RELEASE ORAL at 05:17

## 2020-12-09 RX ADMIN — TIZANIDINE 2 MG: 4 TABLET ORAL at 21:26

## 2020-12-09 RX ADMIN — PREGABALIN 25 MG: 25 CAPSULE ORAL at 13:52

## 2020-12-09 RX ADMIN — QUETIAPINE FUMARATE 50 MG: 25 TABLET ORAL at 17:06

## 2020-12-09 RX ADMIN — PRAVASTATIN SODIUM 80 MG: 80 TABLET ORAL at 20:13

## 2020-12-09 RX ADMIN — ONDANSETRON 4 MG: 2 INJECTION INTRAMUSCULAR; INTRAVENOUS at 13:44

## 2020-12-09 RX ADMIN — OXYCODONE HYDROCHLORIDE AND ACETAMINOPHEN 1 TABLET: 10; 325 TABLET ORAL at 13:52

## 2020-12-09 RX ADMIN — HEPARIN SODIUM 4000 UNITS: 1000 INJECTION INTRAVENOUS; SUBCUTANEOUS at 12:24

## 2020-12-09 RX ADMIN — GLYCOPYRROLATE AND FORMOTEROL FUMARATE 2 PUFF: 9; 4.8 AEROSOL, METERED RESPIRATORY (INHALATION) at 20:26

## 2020-12-09 RX ADMIN — SODIUM CHLORIDE, PRESERVATIVE FREE 10 ML: 5 INJECTION INTRAVENOUS at 08:30

## 2020-12-09 RX ADMIN — INSULIN LISPRO 2 UNITS: 100 INJECTION, SOLUTION INTRAVENOUS; SUBCUTANEOUS at 21:17

## 2020-12-09 RX ADMIN — CARVEDILOL 12.5 MG: 6.25 TABLET, FILM COATED ORAL at 17:06

## 2020-12-09 RX ADMIN — GLYCOPYRROLATE AND FORMOTEROL FUMARATE 2 PUFF: 9; 4.8 AEROSOL, METERED RESPIRATORY (INHALATION) at 08:42

## 2020-12-09 RX ADMIN — TIZANIDINE 2 MG: 4 TABLET ORAL at 13:52

## 2020-12-09 RX ADMIN — OXYCODONE HYDROCHLORIDE AND ACETAMINOPHEN 1 TABLET: 10; 325 TABLET ORAL at 05:17

## 2020-12-09 RX ADMIN — HEPARIN SODIUM 5000 UNITS: 5000 INJECTION INTRAVENOUS; SUBCUTANEOUS at 21:17

## 2020-12-09 RX ADMIN — DARBEPOETIN ALFA 25 MCG: 25 INJECTION, SOLUTION INTRAVENOUS; SUBCUTANEOUS at 12:24

## 2020-12-09 RX ADMIN — ONDANSETRON 4 MG: 2 INJECTION INTRAMUSCULAR; INTRAVENOUS at 23:15

## 2020-12-09 ASSESSMENT — PAIN DESCRIPTION - ORIENTATION: ORIENTATION: LOWER

## 2020-12-09 ASSESSMENT — PAIN SCALES - GENERAL
PAINLEVEL_OUTOF10: 10
PAINLEVEL_OUTOF10: 9

## 2020-12-09 ASSESSMENT — PAIN DESCRIPTION - LOCATION
LOCATION: BACK
LOCATION: BACK

## 2020-12-09 ASSESSMENT — PAIN DESCRIPTION - FREQUENCY: FREQUENCY: CONTINUOUS

## 2020-12-09 NOTE — PROGRESS NOTES
Physical Therapy  Facility/Department: [place] [address] [facility]  Daily Treatment Note  NAME: Elmer Barnhart  : 1970  MRN: [id number]    Date of Service: 2020    Discharge Recommendations:  Subacute/Skilled Nursing Facility   PT Equipment Recommendations  Equipment Needed: No  Other: Defer to facility    Assessment   Body structures, Functions, Activity limitations: Decreased functional mobility ; Decreased strength;Decreased balance;Decreased sensation;Decreased endurance  Assessment: Pt is a 44year old male with PMH including CVA, HTN, HLD, DM with polyneuropathy, COPD, CHF, CAD, OA, ESRD and chronic back pain who presented  with BLE weakness, numbness and inability to walk x 2 days with multiple falls at home. MRI L spine demonstrated mod stenosis L1-2 and mild L5 disc protrusion. (-) for epidural abscess. Pt reports legs giving out at home as cause for fall. Due to concern for buckling, required use of Jaydon Nicolás Plus with forearm and sling support to ambulate 15' + 30 ft with seated rest. Pt will benefit from continued skilled PT while admitted and at discharge to progress functional mobility and safety. Rec SNF. Will continue to follow. Treatment Diagnosis: Impaired sensation and mobility. Prognosis: Fair  Decision Making: Medium Complexity  PT Education: Goals;PT Role;Plan of Care;General Safety;Transfer Training;Gait Training;Functional Mobility Training;Disease Specific Education  Patient Education: Pt was educated regarding safety with OOB mobility secondary to high fall risk - verbalized understanding. Activity Tolerance  Activity Tolerance: Patient limited by fatigue;Patient limited by endurance     Patient Diagnosis(es): The primary encounter diagnosis was Bilateral leg weakness. Diagnoses of Numbness and tingling of both legs and ESRD (end stage renal disease) on dialysis [facility]) were also pertinent to this visit.      has a past medical history of Ambulatory dysfunction, Aortic stenosis, Arthritis, Asthma, Bilateral hilar adenopathy syndrome, CAD (coronary artery disease), Cardiomyopathy ([address] CHF (congestive heart failure) ([address] Chronic pain, COPD (chronic obstructive pulmonary disease) ([address] Depression, Diabetes mellitus ([address] Difficult intravenous access, Emphysema of lung ([address] ESRD (end stage renal disease) on dialysis ([address] Fear of needles, Gastric ulcer, GERD (gastroesophageal reflux disease), Heart valve problem, Hemodialysis patient ([address] History of spinal fracture, Hx of blood clots, Hyperlipidemia, Hypertension, MI (myocardial infarction) ([address] Neuromuscular disorder ([address] Numbness and tingling in left arm, Pneumonia, PONV (postoperative nausea and vomiting), Prolonged emergence from general anesthesia, Sleep apnea, Stroke ([address] TIA (transient ischemic attack), and Unspecified diseases of blood and blood-forming organs. has a past surgical history that includes Tonsillectomy; cyst removal (08/14/2013); Colonoscopy; Coronary angioplasty with stent (05/26/15); vascular surgery (aprx 2 years ago); Colonoscopy (2/29/2015); Upper gastrointestinal endoscopy (01/06/2016); Upper gastrointestinal endoscopy (01/29/2017); other surgical history (02/01/2017); Dialysis fistula creation (Left, 10/30/2017); Diagnostic Cardiac Cath Lab Procedure; other surgical history (2018); other surgical history (Bilateral, 06/26/2018); pr lap insertion tunneled intraperitoneal catheter (N/A, 9/21/2018); other surgical history (Right, 09/2018); Dialysis fistula creation (Left, 3/27/2019); other surgical history (05/28/2019); Endoscopy, colon, diagnostic; Catheter Removal (Right, 7/2/2019); and Aortic valve replacement (N/A, 10/15/2019). Restrictions  Restrictions/Precautions  Restrictions/Precautions: Fall Risk  Required Braces or Orthoses?: No  Position Activity Restriction  Other position/activity restrictions:  Up with assist     Subjective   General  Chart Reviewed: Yes  Response To Previous Treatment: Patient with no complaints from previous session. Family / Caregiver Present: No  Referring Practitioner: JAY Rowley CNP  Subjective  Subjective: Pt lying in bed upon arrival, agreeable to PT/OT  General Comment  Comments: RN cleared for therapy  Pain Screening  Patient Currently in Pain: Yes(BLE, not rated)  Vital Signs  Patient Currently in Pain: Yes(BLE, not rated)       Orientation  Orientation  Overall Orientation Status: Within Functional Limits  Cognition      Objective   Bed mobility  Rolling to Right: Supervision  Supine to Sit: Contact guard assistance(CGA to LE's)  Transfers  Sit to Stand: Dependent/Total(SABINE plus)  Stand to sit: Dependent/Total(SABINE plus)  Ambulation  Ambulation?: Yes  Ambulation 1  Surface: level tile  Device: (SABINE plus)  Assistance: Stand by assistance;Contact guard assistance(+ 1 to navigate the equipment (sabine plus))  Quality of Gait: Utilized SeraPlus for forearm support, sling to maintain hip extension. Removed foot plate so pt was able walk. No overt buckling noted, pt limited by fatigue. Gait Deviations: Slow Ольга; Increased ALANNAH; Decreased step length;Decreased step height  Distance: 15', 30' with seated rest between bouts        Exercises  Knee Long Arc Quad: X 10 BLE at EOB  Ankle Pumps: HR/TR at EOB X 12-15  Comments: Pt reports numbness B LE from mid calf down and in RLE in lateral side or quadricep                  AM-PAC Score     AM-PAC Inpatient Mobility without Stair Climbing Raw Score : 13 (12/09/20 1604)  AM-PAC Inpatient without Stair Climbing T-Scale Score : 38.96 (12/09/20 1604)  Mobility Inpatient CMS 0-100% Score: 58.44 (12/09/20 1604)  Mobility Inpatient without Stair CMS G-Code Modifier : CK (12/09/20 1604)       Goals  Short term goals  Time Frame for Short term goals: 1 week, 12/15/20 unless otherwise noted.   Short term goal 1: Pt will perform bed mobility with independence; 12/9 supervision  Short term goal 2: Pt will perform sit<>stand transfer with min A; 12/9 HERIBERTO plus  Short term goal 3: Pt will perform bed<>chair transfer with LRAD and min A; 12/9 HERIBERTO plus for all mobility  Short term goal 4: Pt will ambulate 20ft with LRAD and min A; 12/9 HERIBERTO plus with A of 2  Short term goal 5: By 12/11 pt will tolerate x 10-15 reps BLE exercise to assist with functional transfers and ambulation. ; 12/9 progressing  Patient Goals   Patient goals : \"To feel my legs again. \"    Plan    Plan  Times per week: 3-5x/wk  Times per day: Daily  Current Treatment Recommendations: Strengthening, Balance Training, Functional Mobility Training, Gait Training, Endurance Training, Safety Education & Training, Patient/Caregiver Education & Training  Safety Devices  Type of devices:  All fall risk precautions in place, Call light within reach, Bed alarm in place, Left in bed, Nurse notified  Restraints  Initially in place: No     Therapy Time   Individual Concurrent Group Co-treatment   Time In [address]          Time Out 1520         Minutes [address], [address] [facility]

## 2020-12-09 NOTE — PROGRESS NOTES
12/09/20 0319   NIV Type   Skin Protection for O2 Device No  (pt does not want mepilex)   Equipment Type v60   Mode CPAP   Mask Type Full face mask   Mask Size Medium   Settings/Measurements   CPAP/EPAP 10 cmH2O   Resp 13   FiO2  60 %   Vt Exhaled 560 mL   Mask Leak (lpm) 35 lpm   Comfort Level Good   Using Accessory Muscles No   SpO2 96   Alarm Settings   Alarms On Y   Press Low Alarm 6 cmH2O   High Pressure Alarm 25 cmH2O   Apnea (secs) 20 secs   Resp Rate Low Alarm 6   High Respiratory Rate 40 br/min

## 2020-12-09 NOTE — CARE COORDINATION
CM notes therapy recs for SNF, attempted to meet with pt and discuss SNF choices and if pt is in agreement. Pt is currently off the floor and in dialysis. Spoke to [facility] and updated that pt will transfer to [address] after dialysis today. CM will reach out again later to discuss SNF.  Minor Caro RN

## 2020-12-09 NOTE — PROGRESS NOTES
[facility]       Progress Note           Subjective/   44y.o. year old male who we are seeing in consultation for ESRD. HPI:  HD today with 2.8 liters removed, post-weight of 118.6 kg. No sob. ROS:  Intake reduced, +weak. Objective/   GEN:  Chronically ill, /86   Pulse 82   Temp 97.9 °F (36.6 °C)   Resp 18   Ht 5' 9\" (1.753 m)   Wt 261 lb 7.5 oz (118.6 kg)   SpO2 96%   BMI 38.61 kg/m²   HEENT: non-icteric, no JVD  CV: S1, S2 without m/r/g; no LE edema  RESP: CTA B without w/r/r; breathing wnl  ABD: +bs, soft, nt, no hsm  SKIN: warm, no rashes  ACCESS: L [facility]    Data/  Recent Labs     12/07/20  0624 12/08/20  0649 12/09/20  0808   WBC 11.2* 11.2* 12.7*   HGB 10.1* 9.6* 9.7*   HCT 30.0* 28.6* 29.1*   MCV 89.9 88.1 88.4    273 285     Recent Labs     12/07/20  0624 12/08/20  0649 12/09/20  0808   * 126* 126*   K 5.0 4.5 4.9   CL 89* 91* 90*   CO2 22 23 21   GLUCOSE 225* 130* 103*   BUN 71* 40* 54*   CREATININE 7.1* 5.1* 7.2*   LABGLOM 8* 12* 8*   GFRAA 10* 14* 10*       Assessment/Plan     ESRD.  - On HD MWF at [facility]. Vascular access: TDC.     Hyponatremia. - Likely from fluid in an ESRD patient. - UF with HD. Fluid restriction.     Hypertension.  - BP readings better after stopping Hydralazine.  - On Torsemide, Carvedilol, Diltiazem, Imdur and Losartan.     Anemia.  - Aranesp 25mcg qweekly with HD qWednesday.   - Hgb 9.7 g/dL.     Bilateral LE Weakness.  - Plans per Neurology

## 2020-12-09 NOTE — PROGRESS NOTES
Occupational Therapy  Facility/Department: [place] [address] REMOTE TELEMETRY  Daily Treatment Note  NAME: Jordyn Hager  : 1970  MRN: [id number]    Date of Service: 2020    Discharge Recommendations:  Subacute/Skilled Nursing Facility     Assessment   Performance deficits / Impairments: Decreased functional mobility ; Decreased ADL status; Decreased sensation;Decreased balance;Decreased strength;Decreased safe awareness;Decreased cognition  Assessment: Pt pleasant and cooperative, pt continues to report numbness in BLE. Pt with inconsistent reporting at times, reports he is unable to dorsiflex RLE at writer's entrance, however no drop foot noted with functional mobility; pt also reports numbness and no feeling but when chair pulled up under pt for seated rest break pt reports able to feel chair on back of calves. Pt requiring use of [place] PLUS for functional transfers and mobility and wouuld benefit from continued skilled OT to address the above noted occupational performance deficits. Prognosis: Fair  OT Education: OT Role;Transfer Training;Equipment  REQUIRES OT FOLLOW UP: Yes  Activity Tolerance  Activity Tolerance: Patient Tolerated treatment well  Safety Devices  Safety Devices in place: Yes  Type of devices: Left in bed;Nurse notified;Call light within reach; Patient at risk for falls         Patient Diagnosis(es): The primary encounter diagnosis was Bilateral leg weakness. Diagnoses of Numbness and tingling of both legs and ESRD (end stage renal disease) on dialysis [facility]) were also pertinent to this visit.       has a past medical history of Ambulatory dysfunction, Aortic stenosis, Arthritis, Asthma, Bilateral hilar adenopathy syndrome, CAD (coronary artery disease), Cardiomyopathy ([address] CHF (congestive heart failure) ([address] Chronic pain, COPD (chronic obstructive pulmonary disease) ([address] Depression, Diabetes mellitus ([address] Difficult intravenous access, Emphysema of lung ([address] ESRD (end stage renal disease) on dialysis [facility]), Fear of needles, Gastric ulcer, GERD (gastroesophageal reflux disease), Heart valve problem, Hemodialysis patient ([address] History of spinal fracture, Hx of blood clots, Hyperlipidemia, Hypertension, MI (myocardial infarction) ([address] Neuromuscular disorder ([address] Numbness and tingling in left arm, Pneumonia, PONV (postoperative nausea and vomiting), Prolonged emergence from general anesthesia, Sleep apnea, Stroke ([address] TIA (transient ischemic attack), and Unspecified diseases of blood and blood-forming organs. has a past surgical history that includes Tonsillectomy; cyst removal (08/14/2013); Colonoscopy; Coronary angioplasty with stent (05/26/15); vascular surgery (aprx 2 years ago); Colonoscopy (2/29/2015); Upper gastrointestinal endoscopy (01/06/2016); Upper gastrointestinal endoscopy (01/29/2017); other surgical history (02/01/2017); Dialysis fistula creation (Left, 10/30/2017); Diagnostic Cardiac Cath Lab Procedure; other surgical history (2018); other surgical history (Bilateral, 06/26/2018); pr lap insertion tunneled intraperitoneal catheter (N/A, 9/21/2018); other surgical history (Right, 09/2018); Dialysis fistula creation (Left, 3/27/2019); other surgical history (05/28/2019); Endoscopy, colon, diagnostic; Catheter Removal (Right, 7/2/2019); and Aortic valve replacement (N/A, 10/15/2019). Restrictions  Restrictions/Precautions  Restrictions/Precautions: Fall Risk  Required Braces or Orthoses?: No  Position Activity Restriction  Other position/activity restrictions: Up with assist     Subjective   General  Patient assessed for rehabilitation services?: Yes  Response to previous treatment: Patient with no complaints from previous session  Family / Caregiver Present: No    Subjective  Subjective: Pt longsitting at approach, agreeable to OT treatment.     Pain Assessment  Non-Pharmaceutical Pain Intervention(s): Ambulation/Increased Activity;Repositioned  Pre Treatment Pain Screening  Intervention List: Patient able to continue with treatment  Vital Signs  Patient Currently in Pain: Yes(reports numbness/tingling pain in BLE, not rated)     Orientation  Orientation  Overall Orientation Status: Within Normal Limits     Objective    ADL  Feeding: Independent  Additional Comments: Pt declined further ADLs. Balance  Sitting Balance: Supervision(EOB)  Standing Balance: Dependent/Total([place] PLUS used for all standing)  Standing Balance  Activity: functional mobility trial x2 with use of [place] PLUS (foot plate removed for ambulation)    Functional Mobility  Functional - Mobility Device: ([place] PLUS)  Activity: Other  Assist Level: Dependent/Total    Bed mobility  Supine to Sit: Modified independent  Sit to Supine: Contact guard assistance(pt requesting assist, for LLE, once writer lightly touched pt able to lift on his own, CGA provided)     Transfers  Sit to stand: Dependent/Total  Stand to sit: Dependent/Total  Transfer Comments: Stefani Enter used for all transfers     Plan   Plan  Times per week: 3-5x  Current Treatment Recommendations: Strengthening, Balance Training, Functional Mobility Training, Endurance Training, Safety Education & Training, Neuromuscular Re-education, Self-Care / ADL    AM-[facility] Score  -[facility] Inpatient Daily Activity Raw Score: 18 (12/09/20 1528)  AM-[facility] Inpatient ADL T-Scale Score : 38.66 (12/09/20 1528)  ADL Inpatient CMS 0-100% Score: 46.65 (12/09/20 1528)  ADL Inpatient CMS G-Code Modifier : CK (12/09/20 1528)    Goals  Short term goals  Time Frame for Short term goals: 7 days 12/15/20  Short term goal 1: Pt will complete toilet transfer with MIN A-- ongoing 12/9/20  Short term goal 2: Pt will complete LB dressing with MIN A with use of AE as needed-- ongoing 12/9/20  Short term goal 3: Pt will complete short house hold distacne with RW with MIN A-- ongoing 12/9/20  Patient Goals   Patient goals :  Will complete ADLs with independent       Therapy Time   Individual Concurrent Group Co-treatment   Time In 1438         Time Out 1516         Minutes [address], [place]

## 2020-12-09 NOTE — PROGRESS NOTES
Hospitalist Progress Note      PCP: Denzel Gao MD    Date of Admission: 12/4/2020    Chief Complaint: Bilateral lower extremity weakness, numbness, and inability to walk for 2 days. Hospital Course: 44 y.o. male who presented to [facility] with above complaint. He has a history of diabetes and neuropathy. He does have lower back pain for long time. He has been feeling more numbness for last 2 days and today he could not get up and walk. He felt like he does not have leg. He fell couple of times and there is no apparent injuries. Currently he does not feel below the mid calf bilateral lower extremities. And he is able to move some extent lower extremities but unable to walk. Subjective:    LP postponed due to Plavix. Will need to be off for 5 days. IN HD this AM- does not feel ready to go home before LP done.      Medications:  Reviewed    Infusion Medications   Scheduled Medications    darbepoetin siddharth-polysorbate  25 mcg Intravenous Q7 Days    lidocaine  1 patch Transdermal Daily    insulin lispro  0-12 Units Subcutaneous TID WC    insulin lispro  0-6 Units Subcutaneous Nightly    aspirin  81 mg Oral Daily    carvedilol  12.5 mg Oral BID WC    [Held by provider] clopidogrel  75 mg Oral Daily    dilTIAZem  180 mg Oral Daily    DULoxetine  30 mg Oral Daily    gabapentin  100 mg Oral TID    insulin glargine  70 Units Subcutaneous Nightly    isosorbide mononitrate  30 mg Oral Daily    linaclotide  145 mcg Oral QAM AC    losartan  50 mg Oral Daily    pantoprazole  40 mg Oral QAM AC    pravastatin  80 mg Oral Nightly    pregabalin  25 mg Oral TID    QUEtiapine  50 mg Oral QPM    glycopyrrolate-formoterol  2 puff Inhalation BID    tiZANidine  2 mg Oral TID    torsemide  100 mg Oral Daily    traZODone  150 mg Oral Nightly    sodium chloride flush  10 mL Intravenous 2 times per day    heparin (porcine)  5,000 Units Subcutaneous 3 times per day     PRN Meds: prochlorperazine, oxyCODONE-acetaminophen, calcium carbonate, albuterol sulfate HFA, heparin (porcine), sodium chloride flush, acetaminophen **OR** acetaminophen, polyethylene glycol, promethazine **OR** ondansetron      Intake/Output Summary (Last 24 hours) at 12/9/2020 1624  Last data filed at 12/9/2020 1342  Gross per 24 hour   Intake 957 ml   Output 2100 ml   Net -1143 ml       Physical Exam Performed:    /86   Pulse 82   Temp 97.9 °F (36.6 °C)   Resp 18   Ht 5' 9\" (1.753 m)   Wt 261 lb 7.5 oz (118.6 kg)   SpO2 96%   BMI 38.61 kg/m²     General appearance: No apparent distress, appears stated age and cooperative. HEENT: Pupils equal, round, and reactive to light. Conjunctivae/corneas clear. Neck: Supple, with full range of motion. No jugular venous distention. Trachea midline. Respiratory:  Normal respiratory effort. Clear to auscultation, bilaterally without Rales/Wheezes/Rhonchi. Cardiovascular: Regular rate and rhythm with normal S1/S2 without murmurs, rubs or gallops. Abdomen: Soft, non-tender, non-distended with normal bowel sounds. Musculoskeletal: No clubbing, cyanosis or edema bilaterally. Full range of motion without deformity. Skin: Skin color, texture, turgor normal.  No rashes or lesions. Neurologic:  Abnormal sensation below the knee bilaterally. 3/5 weakness BLE.    Psychiatric: Alert and oriented, thought content appropriate, normal insight  Capillary Refill: Brisk,< 3 seconds   Peripheral Pulses: +2 palpable, equal bilaterally       Labs:   Recent Labs     12/07/20  0624 12/08/20  0649 12/09/20  0808   WBC 11.2* 11.2* 12.7*   HGB 10.1* 9.6* 9.7*   HCT 30.0* 28.6* 29.1*    273 285     Recent Labs     12/07/20  0624 12/08/20  0649 12/09/20  0808   * 126* 126*   K 5.0 4.5 4.9   CL 89* 91* 90*   CO2 22 23 21   BUN 71* 40* 54*   CREATININE 7.1* 5.1* 7.2*   CALCIUM 9.0 9.0 8.7     No results for input(s): AST, ALT, BILIDIR, BILITOT, ALKPHOS in the last 72 Diagnosis    GBS (Guillain-Wor[place] syndrome) ([facility]) [G61.0]    Bilateral leg weakness [R29.898]    ESRD (end stage renal disease) on dialysis ([facility] [N18.6, Z99.2]    DM (diabetes mellitus), secondary, uncontrolled, w/neurologic complic ([facility] [R83.62, N62.81]     Bilateral lower extremity weakness numbness - unclear etiology. Exam seems inconsistent.  - MRI shows L5-S1 disc protrusion.  - discussed with Ortho Spine - recommended MRI cervical and thoracic spine to rule out epidural abscess, given elevated ESR and CRP. This was WNL  - neurology consulted and following  - Pending LP, Plavix held day 3     ESRD on HD   - nephrology consulted and following.  - MWF HD     Chronic back pain   - continue home medication and muscle relaxant  - temp increase to Percocet from 7.5 to 10mg/325. Discussed that we will not provide this change for him at d/c since he follows with pain management .     DM2 with neuropathy  - continue Lantus and medium dose SSI. - continue gabapentin and Cymbalta. Hyponatremia   - nephrology following.  - hold Celexa.     Anemia - likely due to ESRD.     Chronic diastolic CHF - no evidence of fluid overload     COPD - stable.  Continue home inhalers     CAD -  Continue Coreg, aspirin, Plavix and Pravachol      DVT Prophylaxis: Subcutaneous Heparin  Diet: DIET CARB CONTROL; Daily Fluid Restriction: 2000 ml  Code Status: Full Code    PT/OT Eval Status: ordered    Dispo - LP friday    JAY Gallegos - CNP

## 2020-12-09 NOTE — PROGRESS NOTES
[facility]  Neurology Follow-up  [facility] Neurology    Date of Service: 12/9/2020    Subjective:   CC: Follow up today regarding: lower weakness and falling. Events noted. Chart and lab reviewed. The patient is about the same. Denies any new symptoms today. LP was not done as the patient was on aspirin Plavix. IR wants to do LP within 2 to 3 days. He continues to feel weak in his legs with distal numbness and tingling. No severe back pain or bladder or bowel issues. No breathing difficulties, dysphagia or dysarthria. Other review of system was unremarkable. ROS : A 10-12 system review obtained and updated today and is unremarkable except as mentioned  in my interval history. family history includes Alcohol Abuse in his brother; Cancer in his sister and sister; Diabetes in his mother; Heart Disease in his father, sister, and sister; Kidney Disease in his sister; Obesity in his sister and sister.     Past Medical History:   Diagnosis Date    Ambulatory dysfunction     walker for long distances, SOB with distance    Aortic stenosis     echo 2017    Arthritis     hands and hips    Asthma     Bilateral hilar adenopathy syndrome 6/3/2013    CAD (coronary artery disease)     Dr. Negar Lui [facility]) 04/19/2019    EF= 43%    CHF (congestive heart failure) (HCC)     Chronic pain     COPD (chronic obstructive pulmonary disease) ([address]     pulmonology Dr. Jose A Rivers    Depression     Diabetes mellitus ([address]     borderline    Difficult intravenous access     Emphysema of lung ([address]     ESRD (end stage renal disease) on dialysis ([address]     MWF    Fear of needles     Gastric ulcer     GERD (gastroesophageal reflux disease)     Heart valve problem     bicuspic valve    Hemodialysis patient ([address]     History of spinal fracture     work incident    Hx of blood clots     Bilateral lower extremities; stents in place    Hyperlipidemia     Hypertension     BID  Ny Ewing MD   12.5 mg at 12/08/20 1843    [Held by provider] clopidogrel (PLAVIX) tablet 75 mg  75 mg Oral Daily Ny Ewing MD   75 mg at 12/06/20 0848    dilTIAZem (CARDIZEM CD) extended release capsule 180 mg  180 mg Oral Daily Ny Ewing MD   180 mg at 12/08/20 0929    DULoxetine (CYMBALTA) extended release capsule 30 mg  30 mg Oral Daily Ny Ewing MD   30 mg at 12/08/20 0929    gabapentin (NEURONTIN) capsule 100 mg  100 mg Oral TID Ny Ewing MD   100 mg at 12/08/20 2137    insulin glargine (LANTUS) injection vial 70 Units  70 Units Subcutaneous Nightly Ny Ewing MD   70 Units at 12/08/20 2138    isosorbide mononitrate (IMDUR) extended release tablet 30 mg  30 mg Oral Daily Ny Ewing MD   30 mg at 12/08/20 0929    linaclotide (LINZESS) capsule 145 mcg  145 mcg Oral QAM AC Ny Ewing MD        losartan (COZAAR) tablet 50 mg  50 mg Oral Daily Ny Ewing MD   50 mg at 12/08/20 0928    pantoprazole (PROTONIX) tablet 40 mg  40 mg Oral QAM AC Ny Ewing MD   40 mg at 12/09/20 0517    pravastatin (PRAVACHOL) tablet 80 mg  80 mg Oral Nightly Ny Ewing MD   80 mg at 12/08/20 2138    pregabalin (LYRICA) capsule 25 mg  25 mg Oral TID Ny Ewing MD   25 mg at 12/08/20 2138    QUEtiapine (SEROQUEL) tablet 50 mg  50 mg Oral QPM Ny Ewing MD   50 mg at 12/08/20 1843    glycopyrrolate-formoterol (BEVESPI) 9-4.8 MCG/ACT inhaler 2 puff  2 puff Inhalation BID Ny Ewing MD   2 puff at 12/09/20 0842    tiZANidine (ZANAFLEX) tablet 2 mg  2 mg Oral TID Ny Ewing MD   2 mg at 12/08/20 2138    torsemide (DEMADEX) tablet 100 mg  100 mg Oral Daily Ny Ewing MD   100 mg at 12/08/20 1540    traZODone (DESYREL) tablet 150 mg  150 mg Oral Nightly Ny Ewing MD   150 mg at 12/08/20 3364    sodium chloride flush 0.9 % injection 10 mL  10 mL Intravenous 2 times per day Ny Ewing MD   10 intact and symmetric  IX: Palate elevation is symmetric  XI: Shoulder shrug is intact  XII: Tongue movements are normal  Musculoskeletal: No weakness in the upper extremity. The same inconsistent leg weakness but at least 4/5. Tone: Normal tone. Reflexes are symmetric  No tremors on coordination  Sensory is unremarkable, no sensory loss  Gait cannot be tested during dialysis. Overall exam is unchanged. Data:  LABS:   Lab Results   Component Value Date     12/09/2020    K 4.9 12/09/2020    CL 90 12/09/2020    CO2 21 12/09/2020    BUN 54 12/09/2020    CREATININE 7.2 12/09/2020    GFRAA 10 12/09/2020    GFRAA >60 05/17/2013    LABGLOM 8 12/09/2020    GLUCOSE 103 12/09/2020    PHOS 3.3 06/10/2020    MG 2.10 04/26/2020    CALCIUM 8.7 12/09/2020     Lab Results   Component Value Date    WBC 12.7 12/09/2020    RBC 3.29 12/09/2020    HGB 9.7 12/09/2020    HCT 29.1 12/09/2020    MCV 88.4 12/09/2020    RDW 13.2 12/09/2020     12/09/2020     Lab Results   Component Value Date    INR 1.00 12/07/2020    PROTIME 11.6 12/07/2020       Neuroimaging was independently reviewed by me and discussed results with the patient  I reviewed blood testing and other test results and discussed results with the patient      Impression:   Acute lower extremity weakness with paresthesia and recurrent falling. About the same. Could be combination of chronic diabetic polyneuropathy and chronic lumbar DJD. So far his exam and history are not consistent. We will proceed with LP under IR to rule out possibility of acute emanating polyneuropathy although seems less likely to me. This will be difficult diagnosis to make giving underlying diabetic polyneuropathy. Diabetes, poorly controlled with underlying polyneuropathy  Hyponatremia  Hyperlipidemia  Obesity  End-stage renal disease      Recommendation     The patient is about the same. LP was not done as the patient was on aspirin and Plavix.   The same leg weakness with paresthesia however it was somewhat inconsistent. The patient would like to go to rehab for few days before discharge. He does not feel comfortable going home with his ataxia. ARU consultation  Can proceed with LP in two days if he remain in this hospital   ISS  HD  PT and OT  DVT and GI prophylaxis  Telemetry  Neurochecks  Continue current blood pressure medications  Aspirin for stroke prevention  Discussed with primary team  MDM: [address], [place]   [phone]      This dictation was generated by voice recognition computer software. Although all attempts are made to edit the dictation for accuracy, there may be errors in the transcription that are not intended.

## 2020-12-09 NOTE — PROGRESS NOTES
12/09/20 0000   NIV Type   $NIV $Daily Charge   Equipment Type v60   Mode CPAP   Mask Type Full face mask   Mask Size Medium   Settings/Measurements   CPAP/EPAP 10 cmH2O   Resp 13   FiO2  30 %   Vt Exhaled 722 mL   Mask Leak (lpm) 45 lpm   Comfort Level Good   Using Accessory Muscles No   SpO2 95   Alarm Settings   Alarms On Y   Press Low Alarm 6 cmH2O   High Pressure Alarm 25 cmH2O   Apnea (secs) 20 secs   Resp Rate Low Alarm 6   High Respiratory Rate 40 br/min

## 2020-12-09 NOTE — FLOWSHEET NOTE
Patient tolerated HD tx well. Removed 2800mL of fluid, vital signs remained stable the majority of the treatment. Patient did experience asymptomatic hypotension and UF goal was decreased by 200mL. Aranesp administered at end of treatment. Attempted to call report to [facility] on C5. Unable to reach. Report given to NAVJOT Gao on A1, nurse from patient's new room. Heparin dwell instilled. 12/09/20 1013 12/09/20 1342   Treatment   Time On 0922  --    Time Off  --  1257   Treatment Goal 3400  (as tolerated)  --    Vital Signs   BP (!) 129/59 119/86   Temp 97.9 °F (36.6 °C) 97.9 °F (36.6 °C)   Pulse 77 82   Resp 18 18   Weight 265 lb 6.9 oz (120.4 kg) 261 lb 7.5 oz (118.6 kg)   Weight Method Bed scale Bed scale   Percent Weight Change 2.88 -1.49   Treatment Initiation   Dialyze Hours 3.5  --    Treatment  Initiation Universal Precautions maintained;Lines secured to patient; Connections secured;Prime given;Venous Parameters set; Arterial Parameters set; Air foam detector engaged; Hemosafe Device; Saline line double clamped; Hemo-Safe Applied;Dialyzer;F180  --    Dialysis Bath   K+ (Potassium) 3  --    Ca+ (Calcium) 2.5  --    Na+ (Sodium) 138  --    HCO3 (Bicarb) 32  --    Hemodialysis Central Access Internal jugular Left Neck   Placement Date/Time: 10/14/19 0905   Pre-existing: Yes  Timeout: Patient;Procedure;Site/Side;Consent Confirmed; Appropriate Equipment;Sterile Technique using full body drape;Site prepped with chlorhexidine;Sterile drsg with biopatch; Correct Position  I... Site Assessment Clean;Dry; Intact Clean;Dry; Intact   Access Interventions Aseptic Technique Aseptic Technique   Dressing Intervention Dressing changed New   Dressing Status Clean;Dry; Intact Clean;Dry; Intact   Dressing Change Due 12/16/20  --    Post-Hemodialysis Assessment   Post-Treatment Procedures  --  Blood returned;Catheter capped, clamped and heparinized x 2 ports   Machine Disinfection Process  --  Acid/Vinegar Clean;Heat Disinfect; Exterior Machine Disinfection   Rinseback Volume (ml)  --  300 ml   Total Liters Processed (l/min)  --  79.1 l/min   Dialyzer Clearance  --  Lightly streaked   Duration of Treatment (minutes)  --  210 minutes   Hemodialysis Intake (ml)  --  300 ml   Hemodialysis Output (ml)  --  2100 ml   NET Removed (ml)  --  2800 ml   Tolerated Treatment  --  Good

## 2020-12-10 LAB
ANION GAP SERPL CALCULATED.3IONS-SCNC: 11 MMOL/L (ref 3–16)
ANION GAP SERPL CALCULATED.3IONS-SCNC: 8 MMOL/L (ref 3–16)
BANDED NEUTROPHILS RELATIVE PERCENT: 13 % (ref 0–7)
BASOPHILS ABSOLUTE: 0.2 K/UL (ref 0–0.2)
BASOPHILS RELATIVE PERCENT: 2 %
BUN BLDV-MCNC: 30 MG/DL (ref 7–20)
BUN BLDV-MCNC: 35 MG/DL (ref 7–20)
CALCIUM SERPL-MCNC: 10.2 MG/DL (ref 8.3–10.6)
CALCIUM SERPL-MCNC: 9.5 MG/DL (ref 8.3–10.6)
CHLORIDE BLD-SCNC: 87 MMOL/L (ref 99–110)
CHLORIDE BLD-SCNC: 91 MMOL/L (ref 99–110)
CO2: 28 MMOL/L (ref 21–32)
CO2: 28 MMOL/L (ref 21–32)
CREAT SERPL-MCNC: 4.7 MG/DL (ref 0.9–1.3)
CREAT SERPL-MCNC: 5.8 MG/DL (ref 0.9–1.3)
EOSINOPHILS ABSOLUTE: 0.2 K/UL (ref 0–0.6)
EOSINOPHILS RELATIVE PERCENT: 2 %
GFR AFRICAN AMERICAN: 12
GFR AFRICAN AMERICAN: 16
GFR NON-AFRICAN AMERICAN: 10
GFR NON-AFRICAN AMERICAN: 13
GLUCOSE BLD-MCNC: 117 MG/DL (ref 70–99)
GLUCOSE BLD-MCNC: 128 MG/DL (ref 70–99)
GLUCOSE BLD-MCNC: 156 MG/DL (ref 70–99)
GLUCOSE BLD-MCNC: 164 MG/DL (ref 70–99)
GLUCOSE BLD-MCNC: 213 MG/DL (ref 70–99)
GLUCOSE BLD-MCNC: 217 MG/DL (ref 70–99)
HCT VFR BLD CALC: 32 % (ref 40.5–52.5)
HEMOGLOBIN: 10.6 G/DL (ref 13.5–17.5)
LYMPHOCYTES ABSOLUTE: 1.3 K/UL (ref 1–5.1)
LYMPHOCYTES RELATIVE PERCENT: 12 %
MCH RBC QN AUTO: 29.9 PG (ref 26–34)
MCHC RBC AUTO-ENTMCNC: 33.1 G/DL (ref 31–36)
MCV RBC AUTO: 90.3 FL (ref 80–100)
MONOCYTES ABSOLUTE: 0.4 K/UL (ref 0–1.3)
MONOCYTES RELATIVE PERCENT: 4 %
MYELOCYTE PERCENT: 1 %
NEUTROPHILS ABSOLUTE: 8.6 K/UL (ref 1.7–7.7)
NEUTROPHILS RELATIVE PERCENT: 66 %
PDW BLD-RTO: 13.1 % (ref 12.4–15.4)
PERFORMED ON: ABNORMAL
PLATELET # BLD: 279 K/UL (ref 135–450)
PLATELET SLIDE REVIEW: ADEQUATE
PMV BLD AUTO: 7.1 FL (ref 5–10.5)
POTASSIUM REFLEX MAGNESIUM: 4.6 MMOL/L (ref 3.5–5.1)
POTASSIUM REFLEX MAGNESIUM: 5.5 MMOL/L (ref 3.5–5.1)
RBC # BLD: 3.54 M/UL (ref 4.2–5.9)
SLIDE REVIEW: ABNORMAL
SODIUM BLD-SCNC: 123 MMOL/L (ref 136–145)
SODIUM BLD-SCNC: 130 MMOL/L (ref 136–145)
WBC # BLD: 10.8 K/UL (ref 4–11)

## 2020-12-10 PROCEDURE — 6360000002 HC RX W HCPCS: Performed by: INTERNAL MEDICINE

## 2020-12-10 PROCEDURE — 6370000000 HC RX 637 (ALT 250 FOR IP): Performed by: INTERNAL MEDICINE

## 2020-12-10 PROCEDURE — 6370000000 HC RX 637 (ALT 250 FOR IP): Performed by: NURSE PRACTITIONER

## 2020-12-10 PROCEDURE — 94761 N-INVAS EAR/PLS OXIMETRY MLT: CPT

## 2020-12-10 PROCEDURE — 6360000002 HC RX W HCPCS: Performed by: NURSE PRACTITIONER

## 2020-12-10 PROCEDURE — 1200000000 HC SEMI PRIVATE

## 2020-12-10 PROCEDURE — 94660 CPAP INITIATION&MGMT: CPT

## 2020-12-10 PROCEDURE — 85025 COMPLETE CBC W/AUTO DIFF WBC: CPT

## 2020-12-10 PROCEDURE — 97110 THERAPEUTIC EXERCISES: CPT

## 2020-12-10 PROCEDURE — 97530 THERAPEUTIC ACTIVITIES: CPT

## 2020-12-10 PROCEDURE — 2700000000 HC OXYGEN THERAPY PER DAY

## 2020-12-10 PROCEDURE — 93005 ELECTROCARDIOGRAM TRACING: CPT | Performed by: NURSE PRACTITIONER

## 2020-12-10 PROCEDURE — 2580000003 HC RX 258: Performed by: INTERNAL MEDICINE

## 2020-12-10 PROCEDURE — 94640 AIRWAY INHALATION TREATMENT: CPT

## 2020-12-10 PROCEDURE — 99232 SBSQ HOSP IP/OBS MODERATE 35: CPT | Performed by: PSYCHIATRY & NEUROLOGY

## 2020-12-10 PROCEDURE — 80048 BASIC METABOLIC PNL TOTAL CA: CPT

## 2020-12-10 PROCEDURE — 36415 COLL VENOUS BLD VENIPUNCTURE: CPT

## 2020-12-10 RX ORDER — 0.9 % SODIUM CHLORIDE 0.9 %
500 INTRAVENOUS SOLUTION INTRAVENOUS ONCE
Status: DISCONTINUED | OUTPATIENT
Start: 2020-12-10 | End: 2020-12-13

## 2020-12-10 RX ADMIN — ONDANSETRON 4 MG: 2 INJECTION INTRAMUSCULAR; INTRAVENOUS at 04:26

## 2020-12-10 RX ADMIN — PANTOPRAZOLE SODIUM 40 MG: 40 TABLET, DELAYED RELEASE ORAL at 05:58

## 2020-12-10 RX ADMIN — TRAZODONE HYDROCHLORIDE 150 MG: 50 TABLET ORAL at 22:34

## 2020-12-10 RX ADMIN — PROCHLORPERAZINE EDISYLATE 5 MG: 5 INJECTION INTRAMUSCULAR; INTRAVENOUS at 13:17

## 2020-12-10 RX ADMIN — PRAVASTATIN SODIUM 80 MG: 80 TABLET ORAL at 22:38

## 2020-12-10 RX ADMIN — TIZANIDINE 2 MG: 4 TABLET ORAL at 13:17

## 2020-12-10 RX ADMIN — HEPARIN SODIUM 5000 UNITS: 5000 INJECTION INTRAVENOUS; SUBCUTANEOUS at 04:21

## 2020-12-10 RX ADMIN — HEPARIN SODIUM 5000 UNITS: 5000 INJECTION INTRAVENOUS; SUBCUTANEOUS at 22:35

## 2020-12-10 RX ADMIN — SODIUM CHLORIDE, PRESERVATIVE FREE 10 ML: 5 INJECTION INTRAVENOUS at 08:50

## 2020-12-10 RX ADMIN — HEPARIN SODIUM 5000 UNITS: 5000 INJECTION INTRAVENOUS; SUBCUTANEOUS at 13:17

## 2020-12-10 RX ADMIN — CARVEDILOL 12.5 MG: 6.25 TABLET, FILM COATED ORAL at 08:50

## 2020-12-10 RX ADMIN — PREGABALIN 25 MG: 25 CAPSULE ORAL at 08:50

## 2020-12-10 RX ADMIN — SODIUM CHLORIDE, PRESERVATIVE FREE 10 ML: 5 INJECTION INTRAVENOUS at 22:38

## 2020-12-10 RX ADMIN — LOSARTAN POTASSIUM 50 MG: 25 TABLET, FILM COATED ORAL at 08:50

## 2020-12-10 RX ADMIN — ANTACID TABLETS 500 MG: 500 TABLET, CHEWABLE ORAL at 13:23

## 2020-12-10 RX ADMIN — TIZANIDINE 2 MG: 4 TABLET ORAL at 22:33

## 2020-12-10 RX ADMIN — INSULIN LISPRO 2 UNITS: 100 INJECTION, SOLUTION INTRAVENOUS; SUBCUTANEOUS at 17:07

## 2020-12-10 RX ADMIN — OXYCODONE HYDROCHLORIDE AND ACETAMINOPHEN 1 TABLET: 10; 325 TABLET ORAL at 17:11

## 2020-12-10 RX ADMIN — DILTIAZEM HYDROCHLORIDE 180 MG: 180 CAPSULE, COATED, EXTENDED RELEASE ORAL at 08:50

## 2020-12-10 RX ADMIN — GABAPENTIN 100 MG: 100 CAPSULE ORAL at 13:18

## 2020-12-10 RX ADMIN — PREGABALIN 25 MG: 25 CAPSULE ORAL at 22:33

## 2020-12-10 RX ADMIN — PREGABALIN 25 MG: 25 CAPSULE ORAL at 13:17

## 2020-12-10 RX ADMIN — OXYCODONE HYDROCHLORIDE AND ACETAMINOPHEN 1 TABLET: 10; 325 TABLET ORAL at 04:21

## 2020-12-10 RX ADMIN — INSULIN GLARGINE 70 UNITS: 100 INJECTION, SOLUTION SUBCUTANEOUS at 22:35

## 2020-12-10 RX ADMIN — ONDANSETRON 4 MG: 2 INJECTION INTRAMUSCULAR; INTRAVENOUS at 17:11

## 2020-12-10 RX ADMIN — INSULIN LISPRO 2 UNITS: 100 INJECTION, SOLUTION INTRAVENOUS; SUBCUTANEOUS at 22:36

## 2020-12-10 RX ADMIN — GLYCOPYRROLATE AND FORMOTEROL FUMARATE 2 PUFF: 9; 4.8 AEROSOL, METERED RESPIRATORY (INHALATION) at 08:13

## 2020-12-10 RX ADMIN — DULOXETINE HYDROCHLORIDE 30 MG: 30 CAPSULE, DELAYED RELEASE ORAL at 08:50

## 2020-12-10 RX ADMIN — INSULIN LISPRO 2 UNITS: 100 INJECTION, SOLUTION INTRAVENOUS; SUBCUTANEOUS at 08:56

## 2020-12-10 RX ADMIN — GABAPENTIN 100 MG: 100 CAPSULE ORAL at 22:34

## 2020-12-10 RX ADMIN — TORSEMIDE 100 MG: 100 TABLET ORAL at 08:50

## 2020-12-10 RX ADMIN — QUETIAPINE FUMARATE 50 MG: 25 TABLET ORAL at 17:07

## 2020-12-10 RX ADMIN — ONDANSETRON 4 MG: 2 INJECTION INTRAMUSCULAR; INTRAVENOUS at 10:33

## 2020-12-10 RX ADMIN — OXYCODONE HYDROCHLORIDE AND ACETAMINOPHEN 1 TABLET: 10; 325 TABLET ORAL at 10:33

## 2020-12-10 RX ADMIN — GABAPENTIN 100 MG: 100 CAPSULE ORAL at 08:50

## 2020-12-10 RX ADMIN — POLYETHYLENE GLYCOL 3350 17 G: 17 POWDER, FOR SOLUTION ORAL at 08:50

## 2020-12-10 RX ADMIN — ISOSORBIDE MONONITRATE 30 MG: 30 TABLET, EXTENDED RELEASE ORAL at 08:50

## 2020-12-10 RX ADMIN — TIZANIDINE 2 MG: 4 TABLET ORAL at 08:50

## 2020-12-10 ASSESSMENT — PAIN SCALES - GENERAL
PAINLEVEL_OUTOF10: 8
PAINLEVEL_OUTOF10: 8
PAINLEVEL_OUTOF10: 10

## 2020-12-10 ASSESSMENT — PAIN DESCRIPTION - PAIN TYPE
TYPE: CHRONIC PAIN

## 2020-12-10 ASSESSMENT — PAIN DESCRIPTION - ONSET: ONSET: ON-GOING

## 2020-12-10 ASSESSMENT — PAIN DESCRIPTION - LOCATION
LOCATION: BACK;LEG
LOCATION: BACK
LOCATION: BACK;LEG

## 2020-12-10 ASSESSMENT — PAIN DESCRIPTION - FREQUENCY: FREQUENCY: CONTINUOUS

## 2020-12-10 ASSESSMENT — PAIN DESCRIPTION - ORIENTATION
ORIENTATION: LOWER
ORIENTATION: LOWER

## 2020-12-10 NOTE — PROGRESS NOTES
12/10/20 0037   NIV Type   $NIV $Daily Charge   Skin Assessment Clean, dry, & intact   Skin Protection for O2 Device Yes   NIV Started/Stopped On   Equipment Type V60   Mode CPAP   Mask Type Full face mask   Mask Size Medium   Settings/Measurements   CPAP/EPAP 10 cmH2O   Resp 16   FiO2  30 %   Vt Exhaled 528 mL   Minute Volume 5.9 Liters   Mask Leak (lpm) 14 lpm   Comfort Level Good   Using Accessory Muscles No   SpO2 95   Breath Sounds   Right Upper Lobe Diminished   Right Middle Lobe Diminished   Right Lower Lobe Diminished   Left Upper Lobe Diminished   Left Lower Lobe Diminished   Alarm Settings   Alarms On Y   Press Low Alarm 6 cmH2O   High Pressure Alarm 25 cmH2O   Delay Alarm 20 sec(s)   Resp Rate Low Alarm 6   High Respiratory Rate 40 br/min

## 2020-12-10 NOTE — CONSULTS
Patient: Claudia Lee  [id number]  Date: 12/10/2020      Chief Complaint: leg weakness    History of Present Illness/Hospital Course:  Mr. Eddy Marie is a 44year old male admitted 12/4/2020 with worsening weakness and numbness in both legs. Imaging of the C and T spine was WNL; imaging of the L spine revealed L5-S1 disc protrusion. Neurology following. LP planned if he remains in house. Patient voicing interest in acute rehab.      has a past medical history of Ambulatory dysfunction, Aortic stenosis, Arthritis, Asthma, Bilateral hilar adenopathy syndrome, CAD (coronary artery disease), Cardiomyopathy ([address] CHF (congestive heart failure) ([address] Chronic pain, COPD (chronic obstructive pulmonary disease) ([address] Depression, Diabetes mellitus ([address] Difficult intravenous access, Emphysema of lung ([address] ESRD (end stage renal disease) on dialysis ([address] Fear of needles, Gastric ulcer, GERD (gastroesophageal reflux disease), Heart valve problem, Hemodialysis patient ([address] History of spinal fracture, Hx of blood clots, Hyperlipidemia, Hypertension, MI (myocardial infarction) ([address] Neuromuscular disorder ([address] Numbness and tingling in left arm, Pneumonia, PONV (postoperative nausea and vomiting), Prolonged emergence from general anesthesia, Sleep apnea, Stroke ([address] TIA (transient ischemic attack), and Unspecified diseases of blood and blood-forming organs. has a past surgical history that includes Tonsillectomy; cyst removal (08/14/2013); Colonoscopy; Coronary angioplasty with stent (05/26/15); vascular surgery (aprx 2 years ago); Colonoscopy (2/29/2015); Upper gastrointestinal endoscopy (01/06/2016); Upper gastrointestinal endoscopy (01/29/2017); other surgical history (02/01/2017); Dialysis fistula creation (Left, 10/30/2017);  Diagnostic Cardiac Cath Lab Procedure; other surgical history (2018); other surgical history (Bilateral, 06/26/2018); pr lap insertion tunneled intraperitoneal catheter (N/A, 9/21/2018); other surgical history (Right, 09/2018); Dialysis fistula creation (Left, 3/27/2019); other surgical history (05/28/2019); Endoscopy, colon, diagnostic; Catheter Removal (Right, 7/2/2019); and Aortic valve replacement (N/A, 10/15/2019). reports that he has been smoking cigarettes. He has a 33.00 pack-year smoking history. He has never used smokeless tobacco. He reports previous alcohol use. He reports that he does not use drugs. family history includes Alcohol Abuse in his brother; Cancer in his sister and sister; Diabetes in his mother; Heart Disease in his father, sister, and sister; Kidney Disease in his sister; Obesity in his sister and sister. REVIEW OF SYSTEMS:   CONSTITUTIONAL: negative for fevers, chills, diaphoresis, activity change, appetite change, fatigue, night sweats and unexpected weight change.    EYES: negative for blurred vision, eye discharge, visual disturbance and icterus  HEENT: negative for hearing loss, tinnitus, ear drainage, sinus pressure, nasal congestion, epistaxis and snoring  RESPIRATORY: Negative for hemoptysis, cough, sputum production  CARDIOVASCULAR: negative for chest pain, palpitations, exertional chest pressure/discomfort, edema, syncope  GASTROINTESTINAL: negative for nausea, vomiting, diarrhea, constipation, blood in stool and abdominal pain  GENITOURINARY: negative for frequency, dysuria, urinary incontinence, decreased urine volume, and hematuria  HEMATOLOGIC/LYMPHATIC: negative for easy bruising, bleeding and lymphadenopathy  ALLERGIC/IMMUNOLOGIC: negative for recurrent infections, angioedema, anaphylaxis and drug reactions  ENDOCRINE: negative for weight changes and diabetic symptoms including polyuria, polydipsia and polyphagia  MUSCULOSKELETAL: negative for pain, joint swelling, decreased range of motion and muscle weakness  NEUROLOGICAL: negative for headaches, slurred speech, unilateral weakness  PSYCHIATRIC/BEHAVIORAL: negative for hallucinations, behavioral problems, confusion and agitation. Physical Examination:  Vitals:   Patient Vitals for the past 24 hrs:   BP Temp Temp src Pulse Resp SpO2 Weight   12/10/20 0845 119/66 97.8 °F (36.6 °C) Oral 84 17 97 % --   12/10/20 0823 -- -- -- -- -- 91 % --   12/10/20 0431 -- -- -- -- 13 -- --   12/10/20 0421 117/61 98.4 °F (36.9 °C) -- 73 -- 98 % 261 lb 3.9 oz (118.5 kg)   12/10/20 0037 -- -- -- -- 16 -- --   12/10/20 0021 114/62 98.4 °F (36.9 °C) Axillary 71 17 94 % --   12/09/20 2140 -- -- -- -- -- 95 % --   12/09/20 2029 -- -- -- -- -- 93 % --   12/09/20 1930 112/77 98.2 °F (36.8 °C) Oral 84 14 92 % --   12/09/20 1814 111/66 98.4 °F (36.9 °C) Oral 90 18 91 % --   12/09/20 1342 119/86 97.9 °F (36.6 °C) -- 82 18 -- 261 lb 7.5 oz (118.6 kg)   12/09/20 1330 138/87 97.6 °F (36.4 °C) Oral 90 18 96 % --     Mood: Stable  Const: No distress  ENT: Oral mucosa moist  Eyes: No discharge or injection  CV: Regular rate and rhythm, no murmur rub or gallop noted  Resp: Lungs clear to auscultation bilaterally, no rales wheezes or ronchi  GI: Soft, nontender, nondistended. Normal bowel sounds. Neuro: Alert, oriented, appropriate. No cranial nerve deficits appreciated. 4/5 strength BLEs diffusely  Skin: No lesions or rashes noted. MSK: No joint abnormalities noted.        Lab Results   Component Value Date    WBC 10.8 12/10/2020    HGB 10.6 (L) 12/10/2020    HCT 32.0 (L) 12/10/2020    MCV 90.3 12/10/2020     12/10/2020     Lab Results   Component Value Date    INR 1.00 12/07/2020    INR 0.91 04/25/2020    INR 0.89 02/03/2020    PROTIME 11.6 12/07/2020    PROTIME 10.6 04/25/2020    PROTIME 10.3 02/03/2020     Lab Results   Component Value Date    CREATININE 4.7 (H) 12/10/2020    BUN 30 (H) 12/10/2020     (L) 12/10/2020    K 4.6 12/10/2020    CL 91 (L) 12/10/2020    CO2 28 12/10/2020     Lab Results   Component Value Date    ALT 18 12/04/2020    AST 35 12/04/2020    ALKPHOS 96 12/04/2020    BILITOT <0.2 12/04/2020     Impression    1. Mild L1-2 and L5-S1 degenerative disc height loss. 2. Moderate right L5 neural foraminal narrowing secondary to a right    foraminal L5-S1 disc protrusion. 3. Mild L1-2 spinal canal stenosis. Assessment:  1. BLE weakness   2. ESRD   3. Paresthesias BLE      Recommendations:  Patient with new functional deficits and ongoing medical complexity. Demonstrates ability to tolerate 3 hours therapy/day. He is a good candidate for acute inpatient rehab if approved by [facility]. Thank you for this consult. Please contact me with any questions or concerns. Ciro Jarquin MD 12/10/2020, 11:44 AM

## 2020-12-10 NOTE — CARE COORDINATION
RN CM received message from Nati Rosales[address] [phone], requesting update on Pt status and discharge plans. RN CM called back and update left on VM with return phone number for any additional needs. Follow.

## 2020-12-10 NOTE — PROGRESS NOTES
[facility]  [facility]rology Follow-[facility]  [facility] Neurology    Date of Service: 12/10/2020    Subjective:   CC: Follow up today regarding: lower weakness and falling. Events noted. Chart and lab reviewed. The patient denies any new symptoms today. Awaiting LP likely tomorrow  Continues to be unsteady his legs with occasional distal paresthesia. No difficulty with swallowing, speaking or breathing. No headache, weakness or numbness in his arms. No chest pain. Other review of system was unremarkable. ROS : A 10-12 system review obtained and updated today and is unremarkable except as mentioned  in my interval history. family history includes Alcohol Abuse in his brother; Cancer in his sister and sister; Diabetes in his mother; Heart Disease in his father, sister, and sister; Kidney Disease in his sister; Obesity in his sister and sister.     Past Medical History:   Diagnosis Date    Ambulatory dysfunction     walker for long distances, SOB with distance    Aortic stenosis     echo 2017    Arthritis     hands and hips    Asthma     Bilateral hilar adenopathy syndrome 6/3/2013    CAD (coronary artery disease)     Dr. [facility]) 04/19/2019    EF= 43%    CHF (congestive heart failure) (HCC)     Chronic pain     COPD (chronic obstructive pulmonary disease) ([address]     pulmonology Dr. Toño Chery    Depression     Diabetes mellitus ([address]     borderline    Difficult intravenous access     Emphysema of lung ([address]     ESRD (end stage renal disease) on dialysis ([address]     MWF    Fear of needles     Gastric ulcer     GERD (gastroesophageal reflux disease)     Heart valve problem     bicuspic valve    Hemodialysis patient ([address]     History of spinal fracture     work incident    Hx of blood clots     Bilateral lower extremities; stents in place    Hyperlipidemia     Hypertension     MI (myocardial infarction) ([address] 2019    has had 9 MIs. 2019 was the last    Neuromuscular disorder ([facility]     due to CVA    Numbness and tingling in left arm     from fistula    Pneumonia     PONV (postoperative nausea and vomiting)     Prolonged emergence from general anesthesia     States requires more medication than most people    Sleep apnea     Uses CPAP    Stroke ([facility]     7mm thalamic cva 2017 deficts left side, left side weakness    TIA (transient ischemic attack)     Unspecified diseases of blood and blood-forming organs      Current Facility-Administered Medications   Medication Dose Route Frequency Provider Last Rate Last Dose    darbepoetin siddharth-polysorbate (ARANESP) injection 25 mcg  25 mcg Intravenous Q7 Days Virgilio Mitchell MD   25 mcg at 12/09/20 1224    prochlorperazine (COMPAZINE) injection 5 mg  5 mg Intravenous Q6H PRN JAY Ritter NP   5 mg at 12/07/20 2128    oxyCODONE-acetaminophen (PERCOCET)  MG per tablet 1 tablet  1 tablet Oral W2M PRN JAY Morales CNP   1 tablet at 12/10/20 1033    calcium carbonate (TUMS) chewable tablet 500 mg  500 mg Oral TID PRN JAY Glaser CNP   500 mg at 12/09/20 0818    albuterol sulfate  (90 Base) MCG/ACT inhaler 2 puff  2 puff Inhalation Q6H PRN Lauralee Kussmaul, MD   2 puff at 12/06/20 1902    heparin (porcine) injection 4,000 Units  4,000 Units Intracatheter PRN Virgilio Mitchell MD   4,000 Units at 12/09/20 1224    lidocaine 4 % external patch 1 patch  1 patch Transdermal Daily JAY Glaser CNP   1 patch at 12/10/20 0850    insulin lispro (HUMALOG) injection vial 0-12 Units  0-12 Units Subcutaneous TID  JAY Bianchi CNP   2 Units at 12/10/20 0856    insulin lispro (HUMALOG) injection vial 0-6 Units  0-6 Units Subcutaneous Nightly JAY Bianchi CNP   2 Units at 12/09/20 2117    aspirin chewable tablet 81 mg  81 mg Oral Daily Lauralee Kussmaul, MD   81 mg at 12/06/20 0848    carvedilol (COREG) tablet 12.5 mg  12.5 mg Oral BID MARLENE Pettit Rishi De Jesus MD   12.5 mg at 12/10/20 0850    [Held by provider] clopidogrel (PLAVIX) tablet 75 mg  75 mg Oral Daily Mateo Reaves MD   75 mg at 12/06/20 0848    dilTIAZem (CARDIZEM CD) extended release capsule 180 mg  180 mg Oral Daily Mateo Reaves MD   180 mg at 12/10/20 0850    DULoxetine (CYMBALTA) extended release capsule 30 mg  30 mg Oral Daily Mateo Reaves MD   30 mg at 12/10/20 0850    gabapentin (NEURONTIN) capsule 100 mg  100 mg Oral TID Mateo Reaves MD   100 mg at 12/10/20 0850    insulin glargine (LANTUS) injection vial 70 Units  70 Units Subcutaneous Nightly Mateo Reaves MD   70 Units at 12/09/20 2117    isosorbide mononitrate (IMDUR) extended release tablet 30 mg  30 mg Oral Daily Mateo Reaves MD   30 mg at 12/10/20 0850    linaclotide (LINZESS) capsule 145 mcg  145 mcg Oral QAM AC Mateo Reaves MD        losartan (COZAAR) tablet 50 mg  50 mg Oral Daily Mateo Reaves MD   50 mg at 12/10/20 0850    pantoprazole (PROTONIX) tablet 40 mg  40 mg Oral QAM AC Mateo Reaves MD   40 mg at 12/10/20 0558    pravastatin (PRAVACHOL) tablet 80 mg  80 mg Oral Nightly Mateo Reaves MD   80 mg at 12/09/20 2013    pregabalin (LYRICA) capsule 25 mg  25 mg Oral TID Mateo Reaves MD   25 mg at 12/10/20 0850    QUEtiapine (SEROQUEL) tablet 50 mg  50 mg Oral QPM Mateo Reaves MD   50 mg at 12/09/20 1706    glycopyrrolate-formoterol (BEVESPI) 9-4.8 MCG/ACT inhaler 2 puff  2 puff Inhalation BID Mateo Reaves MD   2 puff at 12/10/20 0813    tiZANidine (ZANAFLEX) tablet 2 mg  2 mg Oral TID Mateo Reaves MD   2 mg at 12/10/20 0850    torsemide (DEMADEX) tablet 100 mg  100 mg Oral Daily Mateo Reaves MD   100 mg at 12/10/20 0850    traZODone (DESYREL) tablet 150 mg  150 mg Oral Nightly Mateo Reaves MD   150 mg at 12/09/20 2117    sodium chloride flush 0.9 % injection 10 mL  10 mL Intravenous 2 times per day Mateo Reaves MD   10 mL at 12/10/20 4597    sodium chloride flush 0.9 % injection 10 mL  10 mL Intravenous PRN Ketty Carney MD        acetaminophen (TYLENOL) tablet 650 mg  650 mg Oral Q6H PRN Ketty Carney MD        Or    acetaminophen (TYLENOL) suppository 650 mg  650 mg Rectal Q6H PRN Ketty Carney MD        polyethylene glycol (GLYCOLAX) packet 17 g  17 g Oral Daily PRN Ketty Carney MD   17 g at 12/10/20 0850    promethazine (PHENERGAN) tablet 12.5 mg  12.5 mg Oral Q6H PRN Ketty Carney MD   12.5 mg at 12/07/20 1653    Or    ondansetron (ZOFRAN) injection 4 mg  4 mg Intravenous Q6H PRN Ketty Carney MD   4 mg at 12/10/20 1033    heparin (porcine) injection 5,000 Units  5,000 Units Subcutaneous 3 times per day Ketty Carney MD   5,000 Units at 12/10/20 0421     Allergies   Allergen Reactions    Morphine Nausea And Vomiting      reports that he has been smoking cigarettes. He has a 33.00 pack-year smoking history. He has never used smokeless tobacco. He reports previous alcohol use. He reports that he does not use drugs. Objective:  Exam:   Constitutional:   Vitals:    12/10/20 0421 12/10/20 0431 12/10/20 0823 12/10/20 0845   BP: 117/61   119/66   Pulse: 73   84   Resp:  13  17   Temp: 98.4 °F (36.9 °C)   97.8 °F (36.6 °C)   TempSrc:    Oral   SpO2: 98%  91% 97%   Weight: 261 lb 3.9 oz (118.5 kg)      Height:         General appearance:  Normal development and appear in no acute distress. Eye: No icterus. Neck: supple  Cardiovascular:  No lower leg edema with good pulsation. Mental Status:   Oriented to person, place, problem, and time. Memory: Good immediate recall. Intact remote memory  Normal attention span and concentration. Language: intact naming, repeating and fluency   Good fund of Knowledge. Cranial Nerves:   II:  Pupils: equal, round, reactive to light  III,IV,VI: Extra Ocular Movements are intact.  No nystagmus  V: Facial sensation is intact  VII: Facial strength and movements: intact and symmetric  IX: Palate elevation is symmetric  XI: Shoulder shrug is intact  XII: Tongue movements are normal  Musculoskeletal: No weakness in the upper extremity. The same inconsistent leg weakness but at least 4/5. Tone: Normal tone. Reflexes are symmetric in his upper extremity and 1+ in his lower extremity  No tremors on coordination  Sensory is unremarkable, no sensory loss  Gait the patient was unable to stand today without assistant. He was unsteady with retropulsion. Overall exam is unchanged. Data:  LABS:   Lab Results   Component Value Date     12/10/2020    K 4.6 12/10/2020    CL 91 12/10/2020    CO2 28 12/10/2020    BUN 30 12/10/2020    CREATININE 4.7 12/10/2020    GFRAA 16 12/10/2020    GFRAA >60 05/17/2013    LABGLOM 13 12/10/2020    GLUCOSE 117 12/10/2020    PHOS 3.3 06/10/2020    MG 2.10 04/26/2020    CALCIUM 10.2 12/10/2020     Lab Results   Component Value Date    WBC 10.8 12/10/2020    RBC 3.54 12/10/2020    HGB 10.6 12/10/2020    HCT 32.0 12/10/2020    MCV 90.3 12/10/2020    RDW 13.1 12/10/2020     12/10/2020     Lab Results   Component Value Date    INR 1.00 12/07/2020    PROTIME 11.6 12/07/2020       Neuroimaging was independently reviewed by me and discussed results with the patient  I reviewed blood testing and other test results and discussed results with the patient      Impression: No change today. Acute lower extremity weakness with paresthesia and recurrent falling. About the same. Could be combination of chronic diabetic polyneuropathy and chronic lumbar DJD. Superimposed acute demyelinating or axonal sensorimotor polyneuropathy cannot be further excluded. Awaiting LP tomorrow.     Diabetes, poorly controlled with underlying polyneuropathy  Hyponatremia  Hyperlipidemia  Obesity  End-stage renal disease      Recommendation     Continue current supportive care  LP under IR  Continue insulin sliding scale  PT OT  Telemetry  DVT and GI prophylaxis  Inpatient rehab consultation  Continue blood pressure monitor and current medication  Continue hemodialysis and follow kidney function test  SSRI  Discussed with primary team      Mahin Adams MD   [phone]      This dictation was generated by voice recognition computer software. Although all attempts are made to edit the dictation for accuracy, there may be errors in the transcription that are not intended.

## 2020-12-10 NOTE — PROGRESS NOTES
Hospitalist Progress Note      PCP: Desirae Mays MD    Date of Admission: 12/4/2020    Chief Complaint: Bilateral lower extremity weakness, numbness, and inability to walk for 2 days. Hospital Course: 44 y.o. male who presented to [facility] with above complaint. He has a history of diabetes and neuropathy. He does have lower back pain for long time. He has been feeling more numbness for last 2 days and today he could not get up and walk. He felt like he does not have leg. He fell couple of times and there is no apparent injuries. Currently he does not feel below the mid calf bilateral lower extremities. And he is able to move some extent lower extremities but unable to walk. Subjective:  Pt is in bed. Working with therapy.    Continued BLE weakness  IR LP tomorrow    Medications:  Reviewed    Infusion Medications   Scheduled Medications    darbepoetin siddharth-polysorbate  25 mcg Intravenous Q7 Days    lidocaine  1 patch Transdermal Daily    insulin lispro  0-12 Units Subcutaneous TID WC    insulin lispro  0-6 Units Subcutaneous Nightly    aspirin  81 mg Oral Daily    carvedilol  12.5 mg Oral BID WC    [Held by provider] clopidogrel  75 mg Oral Daily    dilTIAZem  180 mg Oral Daily    DULoxetine  30 mg Oral Daily    gabapentin  100 mg Oral TID    insulin glargine  70 Units Subcutaneous Nightly    isosorbide mononitrate  30 mg Oral Daily    linaclotide  145 mcg Oral QAM AC    losartan  50 mg Oral Daily    pantoprazole  40 mg Oral QAM AC    pravastatin  80 mg Oral Nightly    pregabalin  25 mg Oral TID    QUEtiapine  50 mg Oral QPM    glycopyrrolate-formoterol  2 puff Inhalation BID    tiZANidine  2 mg Oral TID    torsemide  100 mg Oral Daily    traZODone  150 mg Oral Nightly    sodium chloride flush  10 mL Intravenous 2 times per day    heparin (porcine)  5,000 Units Subcutaneous 3 times per day     PRN Meds: prochlorperazine, oxyCODONE-acetaminophen, calcium carbonate, albuterol sulfate HFA, heparin (porcine), sodium chloride flush, acetaminophen **OR** acetaminophen, polyethylene glycol, promethazine **OR** ondansetron      Intake/Output Summary (Last 24 hours) at 12/10/2020 0853  Last data filed at 12/10/2020 0850  Gross per 24 hour   Intake 1500 ml   Output 2200 ml   Net -700 ml       Physical Exam Performed:    /61   Pulse 73   Temp 98.4 °F (36.9 °C)   Resp 13   Ht 5' 9\" (1.753 m)   Wt 261 lb 3.9 oz (118.5 kg)   SpO2 91%   BMI 38.58 kg/m²     General appearance: No apparent distress, appears stated age and cooperative. HEENT: Pupils equal, round, and reactive to light. Conjunctivae/corneas clear. Neck: Supple, with full range of motion. No jugular venous distention. Trachea midline. Respiratory:  Normal respiratory effort. Clear to auscultation, bilaterally without Rales/Wheezes/Rhonchi. Cardiovascular: Regular rate and rhythm with normal S1/S2 without murmurs, rubs or gallops. Abdomen: Soft, non-tender, non-distended with normal bowel sounds. Musculoskeletal: No clubbing, cyanosis or edema bilaterally. Full range of motion without deformity. Skin: Skin color, texture, turgor normal.  No rashes or lesions. Neurologic:  Abnormal sensation below the knee bilaterally. 3/5 weakness BLE. Psychiatric: Alert and oriented, thought content appropriate, normal insight  Capillary Refill: Brisk,< 3 seconds   Peripheral Pulses: +2 palpable, equal bilaterally       Labs:   Recent Labs     12/08/20  0649 12/09/20  0808   WBC 11.2* 12.7*   HGB 9.6* 9.7*   HCT 28.6* 29.1*    285     Recent Labs     12/08/20  0649 12/09/20  0808   * 126*   K 4.5 4.9   CL 91* 90*   CO2 23 21   BUN 40* 54*   CREATININE 5.1* 7.2*   CALCIUM 9.0 8.7     No results for input(s): AST, ALT, BILIDIR, BILITOT, ALKPHOS in the last 72 hours. Recent Labs     12/07/20  1536   INR 1.00     No results for input(s): Onetha José in the last 72 hours.     Urinalysis:      Lab Results mellitus), secondary, uncontrolled, w/neurologic complic ([facility] [E49.12, B53.79]     Bilateral lower extremity weakness numbness - unclear etiology. Exam seems inconsistent.  - MRI shows L5-S1 disc protrusion.  - discussed with Ortho Spine - recommended MRI cervical and thoracic spine to rule out epidural abscess, given elevated ESR and CRP. This was WNL  - neurology consulted and following  - Pending LP, Plavix held , last dose 12/6/2020     ESRD on HD   - nephrology consulted and following.  - MWF HD     Chronic back pain   - continue home medication and muscle relaxant  - temp increase to Percocet from 7.5 to 10mg/325. Discussed that we will not provide this change for him at d/c since he follows with pain management .     DM2 with neuropathy  - continue Lantus and medium dose SSI. - continue gabapentin and Cymbalta. Hyponatremia   - nephrology following.  - hold Celexa.     Anemia - likely due to ESRD.     Chronic diastolic CHF - no evidence of fluid overload     COPD - stable.  Continue home inhalers     CAD -  Continue Coreg, aspirin, Plavix and Pravachol      DVT Prophylaxis: Subcutaneous Heparin  Diet: DIET CARB CONTROL; Daily Fluid Restriction: 2000 ml  Code Status: Full Code    PT/OT Eval Status: ordered    Dispo - LP friday    JAY Amaral - ILAN

## 2020-12-10 NOTE — CARE COORDINATION
After review, this patient is felt to be:       []  Appropriate for Acute Inpatient Rehab    [x]  Appropriate for Acute Inpatient Rehab Pending Insurance Authorization    []  Not appropriate for Acute Inpatient Rehab    []  Not appropriate at this time, however evaluation ongoing           Precert initiated [id number] for ARU admission. Pt in agreement per  conversation with pt this AM. Will notify DCP with further updates.  Thank you for the referral.  Electronically signed by Braden Latif RCP on 12/10/2020 at 9:12 AM

## 2020-12-10 NOTE — PLAN OF CARE
Problem: Falls - Risk of:  Goal: Will remain free from falls  Description: Will remain free from falls  Outcome: Ongoing  Note: Pt high fall risk. Instructed to use call light before getting out of bed. Call light within reach. Bed in low position. Bed alarm on. Will continue to monitor. Problem: Skin Integrity:  Goal: Will show no infection signs and symptoms  Description: Will show no infection signs and symptoms  Outcome: Ongoing  Note: Patient's skin assessed. See flowsheet. Patient turned in bed. Pressure ulcer prevention in place. No issues with skin integrity this shift. Will continue to monitor. Problem: Pain:  Goal: Pain level will decrease  Description: Pain level will decrease  Outcome: Ongoing  Note: Patient's pain level controlled at this time. Will continue to monitor. Problem: Serum Glucose Level - Abnormal:  Goal: Ability to maintain appropriate glucose levels will improve  Description: Ability to maintain appropriate glucose levels will improve  Outcome: Ongoing  Note: Diabetes education provided today:    Metabolic syndrome: association of diabetes with dyslipidemia, HTN and obesity. Diabetic Neuropathy: signs and therapy. Foot care: advised to wash feet daily, pat dry and apply lotion at night, avoiding between toes. Need to look at feet daily and report to a physician any signs of inflammation or skin damage. Discussed diabetes shoes and socks. Diabetic nephropathy: Kidney function, microalbumin as a sign of diabetic nephropathy. Stages of kidney disease. Nutrition as a mainstream of diabetes therapy. [place] about label reading. Carbs: good carbs and bad carbs, importance of carb counting, incorporation of protein with each meal to reduce Glycemic index, importance of portions, Carb/insulin ratio. Fats: Good fats and bad fats, meal planning and supplements.   Physical activity: advised to exercise 5-7 days a week 30-60 mins at least. Discussed how it affects BS readings. Managing high and low sugar readings. Problem: HEMODYNAMIC STATUS  Goal: Patient has stable vital signs and fluid balance  Outcome: Ongoing  Note:   Patient's EF (Ejection Fraction) is less than 40%    Heart Failure Medications:  Diuretics[de-identified] Torsemide    (One of the following REQUIRED for EF <40%/SYSTOLIC FAILURE but MAY be used in EF% >40%/DIASTOLIC FAILURE)        ACE[de-identified] None        ARB[de-identified] None         ARNI[de-identified] None    (Beta Blockers)  NON- Evidenced Based Beta Blocker (for EF% >40%/DIASTOLIC FAILURE): None    Evidenced Based Beta Blocker::(REQUIRED for EF% <40%/SYSTOLIC FAILURE) Carvedilol- Coreg  . .................................................................................................................................................. Patient's weights and intake/output reviewed: Yes    Patient's Last Weight: 261 lbs obtained by bed scale. Difference of 0 lbs less than last documented weight.       Intake/Output Summary (Last 24 hours) at 12/10/2020 1216  Last data filed at 12/10/2020 1121  Gross per 24 hour   Intake 1980 ml   Output 2200 ml   Net -220 ml       Comorbidities Reviewed Yes    Patient has a past medical history of Ambulatory dysfunction, Aortic stenosis, Arthritis, Asthma, Bilateral hilar adenopathy syndrome, CAD (coronary artery disease), Cardiomyopathy ([address] CHF (congestive heart failure) ([address] Chronic pain, COPD (chronic obstructive pulmonary disease) ([address] Depression, Diabetes mellitus ([address] Difficult intravenous access, Emphysema of lung ([address] ESRD (end stage renal disease) on dialysis ([address] Fear of needles, Gastric ulcer, GERD (gastroesophageal reflux disease), Heart valve problem, Hemodialysis patient ([address] History of spinal fracture, Hx of blood clots, Hyperlipidemia, Hypertension, MI (myocardial infarction) ([address] Neuromuscular disorder ([address] Numbness and tingling in left arm, Pneumonia, PONV (postoperative nausea and vomiting), Prolonged emergence from general anesthesia, Sleep apnea, Stroke ([facility] TIA (transient ischemic attack), and Unspecified diseases of blood and blood-forming organs. >>For CHF and Comorbidity documentation on Education Time and Topics, please see Education Tab    Progressive Mobility Assessment:  What is this patient's Current Level of Mobility?: Ambulatory- with Assistance  How was this patient Mobilized today?: Edge of Bed, Up to Chair, Bedside Commode,  Up to Toilet/Shower, and Up in Room                 With Whom? Nurse, PCA, PT, and OT                 Level of Difficulty/Assistance: 2x Assist     Pt resting in bed at this time on  2 L O2. Pt denies shortness of breath. Pt with pitting lower extremity edema.      Patient and/or Family's stated Goal of Care this Admission: reduce shortness of breath, increase activity tolerance, better understand heart failure and disease management, be more comfortable, and reduce lower extremity edema prior to discharge        :

## 2020-12-10 NOTE — PROGRESS NOTES
Physician Progress Note      PATIENT:               Josefa Hairston  [facility] #:                  [id number]  :                       1970  ADMIT DATE:       2020 1:42 PM  [address] [place] DATE:  RESPONDING  PROVIDER #:        Garfield Hu CNP          QUERY TEXT:    Dear attending provider    Pt admitted  with acute lower extremity weakness. Noted documentation   , and  of weakness possibly due to combination of chronic diabetic   polyneuropathy and chronic lumbar DJD by Neurology consultant. If possible,   please document in progress notes and discharge summary:    The medical record reflects the following:  Risk Factors: 44 y. o. male with ESRD, hyponatremia, chronic lumbar DJD,   diabetic polyneuropathy  Clinical Indicators: Presented with bilateral lower extremity weakness and   numbness  Treatment: Neurology consult, pain management, OT/PT, safety precautions, fall   risk precautions  Options provided:  -- Etiology of presenting acute lower extremity weakness likely due to   diabetic polyneuropathy and chronic lumbar DJD  -- Presenting acute lower extremity weakness not likely due to diabetic   polyneuropathy and chronic lumbar DJD  -- Other - I will add my own diagnosis  -- Disagree - Not applicable / Not valid  -- Disagree - Clinically unable to determine / Unknown  -- Refer to Clinical Documentation Reviewer    PROVIDER RESPONSE TEXT:    The etiology of acute lower extremity weakness not likely due to diabetic   polyneuropathy and chronic lumbar DJD.     Query created by: Nickie Alex on 2020 6:51 PM      Electronically signed by:  Garfield Hu CNP 12/10/2020 8:52 AM

## 2020-12-10 NOTE — PROGRESS NOTES
renal disease) on dialysis ([facility] Fear of needles, Gastric ulcer, GERD (gastroesophageal reflux disease), Heart valve problem, Hemodialysis patient ([facility] History of spinal fracture, Hx of blood clots, Hyperlipidemia, Hypertension, MI (myocardial infarction) ([facility] Neuromuscular disorder ([facility] Numbness and tingling in left arm, Pneumonia, PONV (postoperative nausea and vomiting), Prolonged emergence from general anesthesia, Sleep apnea, Stroke ([facility] TIA (transient ischemic attack), and Unspecified diseases of blood and blood-forming organs. has a past surgical history that includes Tonsillectomy; cyst removal (08/14/2013); Colonoscopy; Coronary angioplasty with stent (05/26/15); vascular surgery (aprx 2 years ago); Colonoscopy (2/29/2015); Upper gastrointestinal endoscopy (01/06/2016); Upper gastrointestinal endoscopy (01/29/2017); other surgical history (02/01/2017); Dialysis fistula creation (Left, 10/30/2017); Diagnostic Cardiac Cath Lab Procedure; other surgical history (2018); other surgical history (Bilateral, 06/26/2018); pr lap insertion tunneled intraperitoneal catheter (N/A, 9/21/2018); other surgical history (Right, 09/2018); Dialysis fistula creation (Left, 3/27/2019); other surgical history (05/28/2019); Endoscopy, colon, diagnostic; Catheter Removal (Right, 7/2/2019); and Aortic valve replacement (N/A, 10/15/2019). Restrictions  Restrictions/Precautions: Fall Risk, Up as Tolerated, General Precautions  Required Braces or Orthoses?: No  Other position/activity restrictions: Up with assist  Subjective   Chart Reviewed: Yes  Response To Previous Treatment: Patient with no complaints from previous session. Family / Caregiver Present: No  Referring Practitioner: JAY Esquivel - CNP  Subjective: pt agrees to therapy, cannot feel his legs today.  Pt states he does not feel well, has not had BM in days  Comments: Pt semi sitting on EOB on approach  Pain Screening  Patient Currently in Pain: assessed  Short term goal 4: Pt will ambulate 20ft with LRAD and min A; 12/9 HERIBERTO plus with A of 2, 12/10 not assessed  Short term goal 5: By 12/11 pt will tolerate x 10-15 reps BLE exercise to assist with functional transfers and ambulation. ; 12/10 progressing  Patient Goals   Patient goals : \"To feel my legs again. \"    Plan    Times per week: 3-5x/wk  Times per day: Daily  Current Treatment Recommendations: Strengthening, Balance Training, Functional Mobility Training, Gait Training, Endurance Training, Safety Education & Training, Patient/Caregiver Education & Training  Safety Devices  Type of devices: All fall risk precautions in place, Patient at risk for falls, Nurse notified, Call light within reach, Left in bed(pt left sitting EOB to eat. Pt declines alarm, voices understanding of fall risk guidelines/up with staff assist/use of call light.  RN aware)  Restraints  Initially in place: No     Therapy Time   Individual Concurrent Group Co-treatment   Time In 0930         Time Out 1010         Minutes 2001 Sirisha Solorio, PT

## 2020-12-10 NOTE — PROGRESS NOTES
12/10/20 0431   NIV Type   Skin Assessment Clean, dry, & intact   Skin Protection for O2 Device Yes   NIV Started/Stopped On   Equipment Type V60   Mode CPAP   Mask Type Full face mask   Mask Size Medium   Settings/Measurements   CPAP/EPAP 10 cmH2O   Resp 13   FiO2  30 %   Vt Exhaled 537 mL   Minute Volume 5.8 Liters   Mask Leak (lpm) 27 lpm   Comfort Level Good   Using Accessory Muscles No   SpO2 98   Breath Sounds   Right Upper Lobe Diminished   Right Middle Lobe Diminished   Right Lower Lobe Diminished   Left Upper Lobe Diminished   Left Lower Lobe Diminished   Alarm Settings   Alarms On Y

## 2020-12-10 NOTE — CARE COORDINATION
Call reviewed from [facility], able to accept Patient as long as agreeable to isolate for 14 days prior to being able to join smokers group. Facility reports can offer sensation patch until able to join smoker group. Minal Parisi, [facility]       Addendum 1317pm: This RN CM spoke with the Pt regarding [facility] acceptance in event ARU is denied. Pt aware of requirements for [facility] admission and is contemplating choice at this time. Advised will f/u to discuss once ARU decision is made.  Jewel Loving RN, BSN,

## 2020-12-10 NOTE — CARE COORDINATION
Chart review day 6: Patient on A1 re Bilateral lower extremity weakness followed by IM and Nephrology. Patient with LP scheduled for Friday. Patient with referral to ARU pending precert, reviewed  SNF recs as anticipate to ARU to be denied, Patient is agreeable requesting smoking facility, referral sent. SW will follow for DCP needs. [employer], [address]: Writer placed return call to [address] ph [phone]  Updated on precert pending for [address] Sol[address].  [employer], LSW

## 2020-12-10 NOTE — PLAN OF CARE
Problem: Serum Glucose Level - Abnormal:  Intervention: Monitor blood glucose measurement  Note: Educated on the importance of monitoring glucose level  and following carb control diet with the diagnosis of CHF. Taught about signs and symptoms of hypo and hyperglycemia and when to seek medical attention if blood sugar is too high or too low. Problem: HEMODYNAMIC STATUS  Goal: Patient has stable vital signs and fluid balance  Intervention: MONITOR PATIENT'S WEIGHT  Note: Pt instructed about the importance of taking PO demadex in order to rid him body of excess fluid. Pt also educated on the importance of weighing himself daily to see if there is any weight gain of 2-3lbs overnight or 5-7lbs in  week and to notify MD.      Patient's EF (Ejection Fraction) is less than 40%    Heart Failure Medications:  Diuretics[de-identified] Torsemide    (One of the following REQUIRED for EF <40%/SYSTOLIC FAILURE but MAY be used in EF% >40%/DIASTOLIC FAILURE)        ACE[de-identified] None        ARB[de-identified] Losartan         ARNI[de-identified] None    (Beta Blockers)  NON- Evidenced Based Beta Blocker (for EF% >40%/DIASTOLIC FAILURE): None    Evidenced Based Beta Blocker::(REQUIRED for EF% <40%/SYSTOLIC FAILURE) Carvedilol- Coreg  . .................................................................................................................................................. Patient's weights and intake/output reviewed: Yes    Patient's Last Weight: 261 lbs obtained by standing scale. Difference of 4 lbs less than last documented weight.       Intake/Output Summary (Last 24 hours) at 12/10/2020 0400  Last data filed at 12/9/2020 2320  Gross per 24 hour   Intake 1197 ml   Output 2200 ml   Net -1003 ml       Comorbidities Reviewed Yes    Patient has a past medical history of Ambulatory dysfunction, Aortic stenosis, Arthritis, Asthma, Bilateral hilar adenopathy syndrome, CAD (coronary artery disease), Cardiomyopathy ([address] CHF (congestive heart failure) ([facility] Chronic pain, COPD (chronic obstructive pulmonary disease) ([address] Depression, Diabetes mellitus ([address] Difficult intravenous access, Emphysema of lung ([address] ESRD (end stage renal disease) on dialysis ([address] Fear of needles, Gastric ulcer, GERD (gastroesophageal reflux disease), Heart valve problem, Hemodialysis patient ([address] History of spinal fracture, Hx of blood clots, Hyperlipidemia, Hypertension, MI (myocardial infarction) ([address] Neuromuscular disorder ([address] Numbness and tingling in left arm, Pneumonia, PONV (postoperative nausea and vomiting), Prolonged emergence from general anesthesia, Sleep apnea, Stroke ([address] TIA (transient ischemic attack), and Unspecified diseases of blood and blood-forming organs. >>For CHF and Comorbidity documentation on Education Time and Topics, please see Education Tab    Progressive Mobility Assessment:  What is this patient's Current Level of Mobility?: Requires Bed Rest  How was this patient Mobilized today?: Edge of Bed and Unable to Mobilize                 With Whom? Nurse and PCA                 Level of Difficulty/Assistance: 2x Assist     Pt resting in bed at this time on room air. Pt denies shortness of breath. Pt with pitting lower extremity edema.      Patient and/or Family's stated Goal of Care this Admission: reduce shortness of breath, increase activity tolerance, better understand heart failure and disease management, be more comfortable, and reduce lower extremity edema prior to discharge        :

## 2020-12-10 NOTE — PROGRESS NOTES
Patient is awake, alert and oriented x4. Assessment is complete, see flow sheet. Bed in lowest position, wheels locked, call light is within reach. Patient denies any further needs at the moment. Will continue to monitor.   Vitals:    12/10/20 0845   BP: 119/66   Pulse: 84   Resp: 17   Temp: 97.8 °F (36.6 °C)   SpO2: 97%     Pain 8/10 - lower back - lidocaine patch applied with heat pack  On 2L O2  Glucose 156

## 2020-12-10 NOTE — PROGRESS NOTES
[facility]       Progress Note           Subjective/   44y.o. year old male who we are seeing in consultation for ESRD. HPI:  HD last on 12/9 with 2.8 liters removed, post-weight of 118.6 kg. No sob. ROS:  Intake reduced, +weak. Objective/   GEN:  Chronically ill, BP (!) 107/58   Pulse 84   Temp 97.7 °F (36.5 °C) (Axillary)   Resp 17   Ht 5' 9\" (1.753 m)   Wt 261 lb 3.9 oz (118.5 kg)   SpO2 95%   BMI 38.58 kg/m²   HEENT: non-icteric, no JVD  CV: S1, S2 without m/r/g; no LE edema  RESP: CTA B without w/r/r; breathing wnl  ABD: +bs, soft, nt, no hsm  SKIN: warm, no rashes  ACCESS: L [facility]    Data/  Recent Labs     12/08/20  0649 12/09/20  0808 12/10/20  0922   WBC 11.2* 12.7* 10.8   HGB 9.6* 9.7* 10.6*   HCT 28.6* 29.1* 32.0*   MCV 88.1 88.4 90.3    285 279     Recent Labs     12/08/20  0649 12/09/20  0808 12/10/20  0922   * 126* 130*   K 4.5 4.9 4.6   CL 91* 90* 91*   CO2 23 21 28   GLUCOSE 130* 103* 117*   BUN 40* 54* 30*   CREATININE 5.1* 7.2* 4.7*   LABGLOM 12* 8* 13*   GFRAA 14* 10* 16*       Assessment/Plan     ESRD.  - On HD MWF at [facility]. - Vascular access: TDC.     Hyponatremia. - Likely from fluid in an ESRD patient. - UF with HD. Fluid restriction.     Hypertension.  - BP readings better after stopping Hydralazine.  - On Torsemide, Carvedilol, Diltiazem, Imdur and Losartan.     Anemia.  - Aranesp 25mcg qweekly with HD qWednesday.   - Hgb 10.6 g/dL.     Bilateral LE Weakness.  - Plans per Neurology

## 2020-12-10 NOTE — PROGRESS NOTES
End of shift report given to [employer], RN. Bed in lowest position with wheels locked. Call light within reach.  No further needs at this time. [facility]

## 2020-12-11 ENCOUNTER — APPOINTMENT (OUTPATIENT)
Dept: INTERVENTIONAL RADIOLOGY/VASCULAR | Age: 50
DRG: 555 | End: 2020-12-11
Payer: COMMERCIAL

## 2020-12-11 LAB
ANION GAP SERPL CALCULATED.3IONS-SCNC: 10 MMOL/L (ref 3–16)
APPEARANCE CSF: CLEAR
BANDED NEUTROPHILS RELATIVE PERCENT: 24 % (ref 0–7)
BASOPHILIC STIPPLING: ABNORMAL
BASOPHILS ABSOLUTE: 0.1 K/UL (ref 0–0.2)
BASOPHILS RELATIVE PERCENT: 1 %
BUN BLDV-MCNC: 39 MG/DL (ref 7–20)
CALCIUM SERPL-MCNC: 9.1 MG/DL (ref 8.3–10.6)
CHLORIDE BLD-SCNC: 89 MMOL/L (ref 99–110)
CLOT EVALUATION CSF: NORMAL
CLOT EVALUATION CSF: NORMAL
CO2: 28 MMOL/L (ref 21–32)
COLOR CSF: COLORLESS
COLOR CSF: COLORLESS
CREAT SERPL-MCNC: 6.4 MG/DL (ref 0.9–1.3)
EKG ATRIAL RATE: 60 BPM
EKG DIAGNOSIS: NORMAL
EKG P AXIS: 66 DEGREES
EKG P-R INTERVAL: 242 MS
EKG Q-T INTERVAL: 460 MS
EKG QRS DURATION: 98 MS
EKG QTC CALCULATION (BAZETT): 460 MS
EKG R AXIS: -21 DEGREES
EKG T AXIS: 76 DEGREES
EKG VENTRICULAR RATE: 60 BPM
EOSINOPHILS ABSOLUTE: 0.5 K/UL (ref 0–0.6)
EOSINOPHILS RELATIVE PERCENT: 5 %
GFR AFRICAN AMERICAN: 11
GFR NON-AFRICAN AMERICAN: 9
GLUCOSE BLD-MCNC: 152 MG/DL (ref 70–99)
GLUCOSE BLD-MCNC: 174 MG/DL (ref 70–99)
GLUCOSE BLD-MCNC: 179 MG/DL (ref 70–99)
GLUCOSE BLD-MCNC: 184 MG/DL (ref 70–99)
GLUCOSE BLD-MCNC: 200 MG/DL (ref 70–99)
GLUCOSE, CSF: 107 MG/DL (ref 40–80)
HCT VFR BLD CALC: 27.1 % (ref 40.5–52.5)
HEMOGLOBIN: 9.1 G/DL (ref 13.5–17.5)
LYMPHOCYTES ABSOLUTE: 2.3 K/UL (ref 1–5.1)
LYMPHOCYTES RELATIVE PERCENT: 21 %
MCH RBC QN AUTO: 30.2 PG (ref 26–34)
MCHC RBC AUTO-ENTMCNC: 33.7 G/DL (ref 31–36)
MCV RBC AUTO: 89.8 FL (ref 80–100)
MONOCYTES ABSOLUTE: 0.2 K/UL (ref 0–1.3)
MONOCYTES RELATIVE PERCENT: 2 %
MYELOCYTE PERCENT: 1 %
NEUTROPHILS ABSOLUTE: 7.7 K/UL (ref 1.7–7.7)
NEUTROPHILS RELATIVE PERCENT: 46 %
NO DIFFERENTIAL CSF: NORMAL
NO DIFFERENTIAL CSF: NORMAL
PDW BLD-RTO: 13.3 % (ref 12.4–15.4)
PERFORMED ON: ABNORMAL
PLATELET # BLD: 243 K/UL (ref 135–450)
PLATELET SLIDE REVIEW: ADEQUATE
PMV BLD AUTO: 7.5 FL (ref 5–10.5)
POLYCHROMASIA: ABNORMAL
POTASSIUM REFLEX MAGNESIUM: 4.9 MMOL/L (ref 3.5–5.1)
PROTEIN CSF: 44 MG/DL (ref 15–45)
RBC # BLD: 3.02 M/UL (ref 4.2–5.9)
RBC CSF: 0 /CUMM
RBC CSF: 0 /CUMM
SLIDE REVIEW: ABNORMAL
SODIUM BLD-SCNC: 127 MMOL/L (ref 136–145)
TUBE NUMBER CSF: NORMAL
TUBE NUMBER CSF: NORMAL
WBC # BLD: 10.8 K/UL (ref 4–11)
WBC CSF: 2 /CUMM (ref 0–5)
WBC CSF: 3 /CUMM (ref 0–5)

## 2020-12-11 PROCEDURE — 6370000000 HC RX 637 (ALT 250 FOR IP): Performed by: INTERNAL MEDICINE

## 2020-12-11 PROCEDURE — 6370000000 HC RX 637 (ALT 250 FOR IP): Performed by: NURSE PRACTITIONER

## 2020-12-11 PROCEDURE — 87205 SMEAR GRAM STAIN: CPT

## 2020-12-11 PROCEDURE — 6360000002 HC RX W HCPCS: Performed by: INTERNAL MEDICINE

## 2020-12-11 PROCEDURE — 94761 N-INVAS EAR/PLS OXIMETRY MLT: CPT

## 2020-12-11 PROCEDURE — 85025 COMPLETE CBC W/AUTO DIFF WBC: CPT

## 2020-12-11 PROCEDURE — 87070 CULTURE OTHR SPECIMN AEROBIC: CPT

## 2020-12-11 PROCEDURE — 009U3ZX DRAINAGE OF SPINAL CANAL, PERCUTANEOUS APPROACH, DIAGNOSTIC: ICD-10-PCS | Performed by: RADIOLOGY

## 2020-12-11 PROCEDURE — 94640 AIRWAY INHALATION TREATMENT: CPT

## 2020-12-11 PROCEDURE — 88112 CYTOPATH CELL ENHANCE TECH: CPT

## 2020-12-11 PROCEDURE — 36415 COLL VENOUS BLD VENIPUNCTURE: CPT

## 2020-12-11 PROCEDURE — 84157 ASSAY OF PROTEIN OTHER: CPT

## 2020-12-11 PROCEDURE — 89050 BODY FLUID CELL COUNT: CPT

## 2020-12-11 PROCEDURE — 99232 SBSQ HOSP IP/OBS MODERATE 35: CPT | Performed by: PSYCHIATRY & NEUROLOGY

## 2020-12-11 PROCEDURE — 93010 ELECTROCARDIOGRAM REPORT: CPT | Performed by: INTERNAL MEDICINE

## 2020-12-11 PROCEDURE — 62328 DX LMBR SPI PNXR W/FLUOR/CT: CPT

## 2020-12-11 PROCEDURE — 82945 GLUCOSE OTHER FLUID: CPT

## 2020-12-11 PROCEDURE — 6360000002 HC RX W HCPCS: Performed by: NURSE PRACTITIONER

## 2020-12-11 PROCEDURE — 90935 HEMODIALYSIS ONE EVALUATION: CPT

## 2020-12-11 PROCEDURE — 2700000000 HC OXYGEN THERAPY PER DAY

## 2020-12-11 PROCEDURE — 80048 BASIC METABOLIC PNL TOTAL CA: CPT

## 2020-12-11 PROCEDURE — 2580000003 HC RX 258: Performed by: INTERNAL MEDICINE

## 2020-12-11 PROCEDURE — 1200000000 HC SEMI PRIVATE

## 2020-12-11 RX ORDER — LORAZEPAM 2 MG/ML
0.5 INJECTION INTRAMUSCULAR ONCE
Status: COMPLETED | OUTPATIENT
Start: 2020-12-11 | End: 2020-12-11

## 2020-12-11 RX ADMIN — GABAPENTIN 100 MG: 100 CAPSULE ORAL at 22:46

## 2020-12-11 RX ADMIN — TRAZODONE HYDROCHLORIDE 150 MG: 50 TABLET ORAL at 22:46

## 2020-12-11 RX ADMIN — GLYCOPYRROLATE AND FORMOTEROL FUMARATE 2 PUFF: 9; 4.8 AEROSOL, METERED RESPIRATORY (INHALATION) at 20:12

## 2020-12-11 RX ADMIN — ISOSORBIDE MONONITRATE 30 MG: 30 TABLET, EXTENDED RELEASE ORAL at 08:45

## 2020-12-11 RX ADMIN — ASPIRIN 81 MG CHEWABLE TABLET 81 MG: 81 TABLET CHEWABLE at 08:45

## 2020-12-11 RX ADMIN — OXYCODONE HYDROCHLORIDE AND ACETAMINOPHEN 1 TABLET: 10; 325 TABLET ORAL at 00:09

## 2020-12-11 RX ADMIN — INSULIN LISPRO 2 UNITS: 100 INJECTION, SOLUTION INTRAVENOUS; SUBCUTANEOUS at 17:37

## 2020-12-11 RX ADMIN — PANTOPRAZOLE SODIUM 40 MG: 40 TABLET, DELAYED RELEASE ORAL at 06:08

## 2020-12-11 RX ADMIN — TIZANIDINE 2 MG: 4 TABLET ORAL at 22:47

## 2020-12-11 RX ADMIN — PREGABALIN 25 MG: 25 CAPSULE ORAL at 17:26

## 2020-12-11 RX ADMIN — INSULIN LISPRO 1 UNITS: 100 INJECTION, SOLUTION INTRAVENOUS; SUBCUTANEOUS at 22:48

## 2020-12-11 RX ADMIN — PREGABALIN 25 MG: 25 CAPSULE ORAL at 22:46

## 2020-12-11 RX ADMIN — HEPARIN SODIUM 5000 UNITS: 5000 INJECTION INTRAVENOUS; SUBCUTANEOUS at 22:46

## 2020-12-11 RX ADMIN — HEPARIN SODIUM 5000 UNITS: 5000 INJECTION INTRAVENOUS; SUBCUTANEOUS at 17:41

## 2020-12-11 RX ADMIN — OXYCODONE HYDROCHLORIDE AND ACETAMINOPHEN 1 TABLET: 10; 325 TABLET ORAL at 12:09

## 2020-12-11 RX ADMIN — QUETIAPINE FUMARATE 50 MG: 25 TABLET ORAL at 17:34

## 2020-12-11 RX ADMIN — TORSEMIDE 100 MG: 100 TABLET ORAL at 08:45

## 2020-12-11 RX ADMIN — ONDANSETRON 4 MG: 2 INJECTION INTRAMUSCULAR; INTRAVENOUS at 17:48

## 2020-12-11 RX ADMIN — ANTACID TABLETS 500 MG: 500 TABLET, CHEWABLE ORAL at 00:11

## 2020-12-11 RX ADMIN — TIZANIDINE 2 MG: 4 TABLET ORAL at 17:26

## 2020-12-11 RX ADMIN — OXYCODONE HYDROCHLORIDE AND ACETAMINOPHEN 1 TABLET: 10; 325 TABLET ORAL at 17:48

## 2020-12-11 RX ADMIN — GABAPENTIN 100 MG: 100 CAPSULE ORAL at 17:26

## 2020-12-11 RX ADMIN — GABAPENTIN 100 MG: 100 CAPSULE ORAL at 08:45

## 2020-12-11 RX ADMIN — DILTIAZEM HYDROCHLORIDE 180 MG: 180 CAPSULE, COATED, EXTENDED RELEASE ORAL at 08:45

## 2020-12-11 RX ADMIN — OXYCODONE HYDROCHLORIDE AND ACETAMINOPHEN 1 TABLET: 10; 325 TABLET ORAL at 06:09

## 2020-12-11 RX ADMIN — ANTACID TABLETS 500 MG: 500 TABLET, CHEWABLE ORAL at 08:43

## 2020-12-11 RX ADMIN — TIZANIDINE 2 MG: 4 TABLET ORAL at 08:45

## 2020-12-11 RX ADMIN — ANTACID TABLETS 500 MG: 500 TABLET, CHEWABLE ORAL at 17:24

## 2020-12-11 RX ADMIN — SODIUM CHLORIDE, PRESERVATIVE FREE 10 ML: 5 INJECTION INTRAVENOUS at 08:47

## 2020-12-11 RX ADMIN — SODIUM CHLORIDE, PRESERVATIVE FREE 10 ML: 5 INJECTION INTRAVENOUS at 22:48

## 2020-12-11 RX ADMIN — LOSARTAN POTASSIUM 50 MG: 25 TABLET, FILM COATED ORAL at 08:45

## 2020-12-11 RX ADMIN — INSULIN GLARGINE 70 UNITS: 100 INJECTION, SOLUTION SUBCUTANEOUS at 22:47

## 2020-12-11 RX ADMIN — CARVEDILOL 12.5 MG: 6.25 TABLET, FILM COATED ORAL at 08:45

## 2020-12-11 RX ADMIN — CARVEDILOL 12.5 MG: 6.25 TABLET, FILM COATED ORAL at 17:26

## 2020-12-11 RX ADMIN — DULOXETINE HYDROCHLORIDE 30 MG: 30 CAPSULE, DELAYED RELEASE ORAL at 08:47

## 2020-12-11 RX ADMIN — PRAVASTATIN SODIUM 80 MG: 80 TABLET ORAL at 22:45

## 2020-12-11 RX ADMIN — PROCHLORPERAZINE EDISYLATE 5 MG: 5 INJECTION INTRAMUSCULAR; INTRAVENOUS at 23:01

## 2020-12-11 RX ADMIN — PREGABALIN 25 MG: 25 CAPSULE ORAL at 08:45

## 2020-12-11 RX ADMIN — ANTACID TABLETS 500 MG: 500 TABLET, CHEWABLE ORAL at 15:20

## 2020-12-11 RX ADMIN — LORAZEPAM 0.5 MG: 2 INJECTION, SOLUTION INTRAMUSCULAR; INTRAVENOUS at 10:59

## 2020-12-11 ASSESSMENT — PAIN DESCRIPTION - PAIN TYPE: TYPE: CHRONIC PAIN

## 2020-12-11 ASSESSMENT — PAIN SCALES - GENERAL
PAINLEVEL_OUTOF10: 8
PAINLEVEL_OUTOF10: 0

## 2020-12-11 NOTE — CARE COORDINATION
RN ETHAN made aware that insurance denied ARU. Per CNP, no P2P. Will pursue SNF placement. RN ETHAN will talk with Pt to confirm 1st, 2nd, and 3rd choice. Addendum 1016am: After discussing DC plan with Pt he is agreeable to going to [address] [place] [address]. Message left for Ashish Monroe in admissions to start pre cert. Addendum 1130am: Message received from Ashish Monroe that she is starting pre cert today. Will follow. Addendum 1530pm: This RN CM received phone call from Ashish Monroe with [facility] who states that pre cert was approved and they can accept the Pt tomorrow 12/12. Attempted to update Pt, Pt BELEN in HD . Message to MD with update.

## 2020-12-11 NOTE — PROGRESS NOTES
12/10/20 1935   NIV Type   Skin Assessment Clean, dry, & intact   Skin Protection for O2 Device Yes   Location Nose   NIV Started/Stopped On   Equipment Type V60   Mode CPAP   Mask Type Full face mask   Mask Size Medium   Settings/Measurements   CPAP/EPAP 10 cmH2O   Resp 12   FiO2  30 %   Vt Exhaled 514 mL   Minute Volume 4.3 Liters   Mask Leak (lpm) 34 lpm   Comfort Level Good   Using Accessory Muscles No   SpO2 98   Breath Sounds   Right Upper Lobe Diminished   Right Middle Lobe Diminished   Right Lower Lobe Diminished   Left Upper Lobe Diminished   Left Lower Lobe Diminished   Alarm Settings   Alarms On Y   Press Low Alarm 6 cmH2O   High Pressure Alarm 25 cmH2O   Delay Alarm 20 sec(s)   Resp Rate Low Alarm 6   High Respiratory Rate 40 br/min   Oxygen Therapy/Pulse Ox   O2 Therapy Oxygen   O2 Device PAP (positive airway pressure)   SpO2 98 %

## 2020-12-11 NOTE — CARE COORDINATION
Patient was unfortunately denied by [employer]. Their reasoning is that his needs could be met at SNF level or at home with help. P2P was offered and can be completed within 60 days from 8 am to 8 pm.  The number is [phone]. Will follow up with CM.     Electronically signed by Garcia Soares RCP on 12/11/2020 at 8:28 AM

## 2020-12-11 NOTE — FLOWSHEET NOTE
Treatment time: 3:10     Net UF: 900 mls     Pre weight: 119.9 kg   Post weight: 119kg  EDW: tbd     Access used: R TDC    Access function: good     Medications or blood products given: none     Regular outpatient schedule: [facility]    Summary of response to treatment: 900 mls removed. Pt tolerated tx well. No meds given.  Post  VSS     Copy of dialysis treatment record placed in chart, to be scanned into EMR.     12/11/20 1252 12/11/20 1610   Vital Signs   BP (!) 178/134 124/74   Temp 98 °F (36.7 °C) 98 °F (36.7 °C)   Pulse 74 85   Weight 264 lb 5.3 oz (119.9 kg) 262 lb 5.6 oz (119 kg)   Weight Method Bed scale Bed scale   Post-Hemodialysis Assessment   Hemodialysis Intake (ml)  --  450 ml   Hemodialysis Output (ml)  --  1350 ml   NET Removed (ml)  --  900 ml   Tolerated Treatment  --  Good

## 2020-12-11 NOTE — OP NOTE
Brief Postoperative Note    Tatianna Moreno  YOB: 1970  [id number]    Pre-operative Diagnosis: numbness in legs    Post-operative Diagnosis: Same    Procedure: fluoro guided LP    Anesthesia: Local    Surgeons/Assistants: Dr. Maurice Toledo    Estimated Blood Loss: less than 5ml    Complications: None    Specimens: Was Obtained: CSF    Findings: clear colorless CSF    Electronically signed by Jackie Peña MD on 12/11/2020 at 11:44 AM

## 2020-12-11 NOTE — PROGRESS NOTES
[facility]       Progress Note           Subjective/   44y.o. year old male who we are seeing in consultation for ESRD. HPI:  HD last on 12/10 with 900 ml removed, post-weight of 119 kg. No sob. ROS:  Intake better, +weak. Objective/   GEN:  Chronically ill, /79   Pulse 86   Temp 97.4 °F (36.3 °C) (Oral)   Resp 16   Ht 5' 9\" (1.753 m)   Wt 262 lb 5.6 oz (119 kg)   SpO2 94%   BMI 38.74 kg/m²   HEENT: non-icteric, no JVD  CV: S1, S2 without m/r/g; no LE edema  RESP: CTA B without w/r/r; breathing wnl  ABD: +bs, soft, nt, no hsm  SKIN: warm, no rashes  ACCESS: L [facility] [place]    Data/  Recent Labs     12/09/20  0808 12/10/20  0922 12/11/20  0449   WBC 12.7* 10.8 10.8   HGB 9.7* 10.6* 9.1*   HCT 29.1* 32.0* 27.1*   MCV 88.4 90.3 89.8    279 243     Recent Labs     12/10/20  0922 12/10/20  2044 12/11/20  0449   * 123* 127*   K 4.6 5.5* 4.9   CL 91* 87* 89*   CO2 28 28 28   GLUCOSE 117* 213* 179*   BUN 30* 35* 39*   CREATININE 4.7* 5.8* 6.4*   LABGLOM 13* 10* 9*   GFRAA 16* 12* 11*       Assessment/Plan     ESRD.  - On HD MWF at [facility]. - Vascular access: TDC.     Hyponatremia. - Likely from fluid in an ESRD patient. - UF with HD. Fluid restriction.     Hypertension.  - BP readings better after stopping Hydralazine.  - On Torsemide, Carvedilol, Diltiazem, Imdur and Losartan.     Anemia.  - Aranesp 25mcg qweekly with HD qWednesday.   - Hgb 9.1 g/dL.     Bilateral LE Weakness.  - Plans per Neurology, s/p LP on 12/11

## 2020-12-11 NOTE — PROGRESS NOTES
End of shift report given to [employer], RN. Bed in lowest position with wheels locked. Call light within reach.  No further needs at this time. [facility]

## 2020-12-11 NOTE — PROGRESS NOTES
Physical Therapy  Attempted to see pt for PT f/u, pt currently off floor for HD. Will re-attempt as schedule permits. Thanks.    Ryan Rajput PT, DPT

## 2020-12-11 NOTE — PROGRESS NOTES
Lumbar puncture procedure reviewed with the patient and written informed consent has been obtained. All questions answered. Consent placed in chart.

## 2020-12-11 NOTE — PROGRESS NOTES
Hospitalist Progress Note      PCP: Emeli Shaver MD    Date of Admission: 12/4/2020    Chief Complaint: Bilateral lower extremity weakness, numbness, and inability to walk for 2 days. Hospital Course: 44 y.o. male who presented to [facility] with above complaint. He has a history of diabetes and neuropathy. He does have lower back pain for long time. He has been feeling more numbness for last 2 days and today he could not get up and walk. He felt like he does not have leg. He fell couple of times and there is no apparent injuries. Currently he does not feel below the mid calf bilateral lower extremities. And he is able to move some extent lower extremities but unable to walk. Subjective:    Pt is in bed, pending LP this AM.  He is anxious. Able to stand at the bedside, not walking.      Medications:  Reviewed    Infusion Medications   Scheduled Medications    LORazepam  0.5 mg Intravenous Once    sodium chloride  500 mL Intravenous Once    darbepoetin siddharth-polysorbate  25 mcg Intravenous Q7 Days    lidocaine  1 patch Transdermal Daily    insulin lispro  0-12 Units Subcutaneous TID WC    insulin lispro  0-6 Units Subcutaneous Nightly    aspirin  81 mg Oral Daily    carvedilol  12.5 mg Oral BID WC    [Held by provider] clopidogrel  75 mg Oral Daily    dilTIAZem  180 mg Oral Daily    DULoxetine  30 mg Oral Daily    gabapentin  100 mg Oral TID    insulin glargine  70 Units Subcutaneous Nightly    isosorbide mononitrate  30 mg Oral Daily    linaclotide  145 mcg Oral QAM AC    losartan  50 mg Oral Daily    pantoprazole  40 mg Oral QAM AC    pravastatin  80 mg Oral Nightly    pregabalin  25 mg Oral TID    QUEtiapine  50 mg Oral QPM    glycopyrrolate-formoterol  2 puff Inhalation BID    tiZANidine  2 mg Oral TID    torsemide  100 mg Oral Daily    traZODone  150 mg Oral Nightly    sodium chloride flush  10 mL Intravenous 2 times per day    heparin (porcine)  5,000 Units BILIDIR, BILITOT, ALKPHOS in the last 72 hours. No results for input(s): INR in the last 72 hours. No results for input(s): Melton Bacliio in the last 72 hours. Urinalysis:      Lab Results   Component Value Date    NITRU Negative 06/05/2020    WBCUA 10-20 06/05/2020    BACTERIA 2+ 06/05/2020    RBCUA 0-2 06/05/2020    BLOODU TRACE-INTACT 06/05/2020    SPECGRAV 1.020 06/05/2020    GLUCOSEU 250 06/05/2020       Radiology:  CT ABDOMEN PELVIS W IV CONTRAST Additional Contrast? Radiologist Recommendation   Final Result   No acute abdominopelvic abnormality. MRI THORACIC SPINE WO CONTRAST   Final Result   No acute thoracic spine abnormality. No thoracic spinal canal or neural   foraminal stenosis. MRI CERVICAL SPINE WO CONTRAST   Final Result   No acute cervical spine abnormality. Mild degenerative spondylosis without significant cervical spinal canal   stenosis or high-grade neural foraminal stenosis. Mild left C4-C5 and right C6-C7 neural foraminal stenosis. MRI LUMBAR SPINE WO CONTRAST   Final Result   1. Mild L1-2 and L5-S1 degenerative disc height loss. 2. Moderate right L5 neural foraminal narrowing secondary to a right   foraminal L5-S1 disc protrusion. 3. Mild L1-2 spinal canal stenosis. CT ABDOMEN PELVIS WO CONTRAST   Final Result   1. No CT evidence of an acute intra-abdominal or intrapelvic process. 2.  No findings to suggest acute appendicitis; no ureter calculus or   hydronephrosis. 3. Probably reactive lymph nodes in the right upper quadrant, unchanged from   prior study. 4.  Mild extrahepatic bile duct dilatation status post cholecystectomy   typical of reservoir effect. 5.  Calcific atherosclerotic disease aorta. 6. Small, fat containing umbilical hernia. 7. Benign left adrenal adenoma requires no additional evaluation or follow-up.          IR LUMBAR PUNCTURE FOR DIAGNOSIS    (Results Pending)   IR LUMBAR PUNCTURE FOR DIAGNOSIS (Results Pending)           Assessment/Plan:    Active Hospital Problems    Diagnosis    GBS (Guillain-Galt syndrome) ([facility]) [G61.0]    Bilateral leg weakness [R29.898]    ESRD (end stage renal disease) on dialysis ([facility] [N18.6, Z99.2]    DM (diabetes mellitus), secondary, uncontrolled, w/neurologic complic ([facility] [address] [X35.45, L04.82]     Bilateral lower extremity weakness numbness - unclear etiology. Exam seems inconsistent.  - MRI shows L5-S1 disc protrusion.  - discussed with Ortho Spine - recommended MRI cervical and thoracic spine to rule out epidural abscess, given elevated ESR and CRP. This was WNL  - neurology consulted and following  - Pending LP, Plavix held , last dose 12/6/2020     ESRD on HD   - nephrology consulted and following.  - MWF HD     Chronic back pain   - continue home medication and muscle relaxant  - temp increase to Percocet from 7.5 to 10mg/325. Discussed that we will not provide this change for him at d/c since he follows with pain management .     DM2 with neuropathy  - continue Lantus and medium dose SSI. - continue gabapentin and Cymbalta. Hyponatremia   - nephrology following.  - hold Celexa.     Anemia - likely due to ESRD.     Chronic diastolic CHF - no evidence of fluid overload     COPD - stable.  Continue home inhalers     CAD -  Continue Coreg, aspirin, Plavix and Pravachol      DVT Prophylaxis: Subcutaneous Heparin  Diet: Diet NPO, After Midnight Exceptions are: Ice Chips, Sips with Meds  Code Status: Full Code    PT/OT Eval Status: ordered    Dispo - LP today, precert pending for [facility]    JAY Rowley - CNP

## 2020-12-11 NOTE — PROGRESS NOTES
Son Vivar  Neurology Follow-up  [facility] Neurology    Date of Service: 12/11/2020    Subjective:   CC: Follow up today regarding: lower leg weakness and falling. Events noted. Chart and lab reviewed. The patient just came from his LP. Results are pending. No new symptoms today. The same distal paresthesia and leg weakness. Denies any headache, neck or back pain, numbness or tingling in his hands. Other review of system was unremarkable. ROS : A 10-12 system review obtained and updated today and is unremarkable except as mentioned  in my interval history. family history includes Alcohol Abuse in his brother; Cancer in his sister and sister; Diabetes in his mother; Heart Disease in his father, sister, and sister; Kidney Disease in his sister; Obesity in his sister and sister.     Past Medical History:   Diagnosis Date    Ambulatory dysfunction     walker for long distances, SOB with distance    Aortic stenosis     echo 2017    Arthritis     hands and hips    Asthma     Bilateral hilar adenopathy syndrome 6/3/2013    CAD (coronary artery disease)     Dr. Coty Raymundo [facility]) 04/19/2019    EF= 43%    CHF (congestive heart failure) (HCC)     Chronic pain     COPD (chronic obstructive pulmonary disease) ([address]     pulmonology Dr. Jarocho Gordillo    Depression     Diabetes mellitus ([address]     borderline    Difficult intravenous access     Emphysema of lung ([address]     ESRD (end stage renal disease) on dialysis ([address]     MWF    Fear of needles     Gastric ulcer     GERD (gastroesophageal reflux disease)     Heart valve problem     bicuspic valve    Hemodialysis patient ([address]     History of spinal fracture     work incident    Hx of blood clots     Bilateral lower extremities; stents in place    Hyperlipidemia     Hypertension     MI (myocardial infarction) ([address] 2019    has had 9 MIs. 2019 was the last    Neuromuscular disorder ([address]     due to CVA    Numbness and tingling in left arm     from fistula    Pneumonia     PONV (postoperative nausea and vomiting)     Prolonged emergence from general anesthesia     States requires more medication than most people    Sleep apnea     Uses CPAP    Stroke ([facility] [address]     7mm thalamic cva 2017 deficts left side, left side weakness    TIA (transient ischemic attack)     Unspecified diseases of blood and blood-forming organs      Current Facility-Administered Medications   Medication Dose Route Frequency Provider Last Rate Last Dose    0.9 % sodium chloride bolus  500 mL Intravenous Once Beecher Spurling, APRN - CNP        darbepoetin siddharth-polysorbate (ARANESP) injection 25 mcg  25 mcg Intravenous Q7 Days Lisa River MD   25 mcg at 12/09/20 1224    prochlorperazine (COMPAZINE) injection 5 mg  5 mg Intravenous Q6H PRN JAY Kahn NP   5 mg at 12/10/20 1317    oxyCODONE-acetaminophen (PERCOCET)  MG per tablet 1 tablet  1 tablet Oral V9X PRN ZolJAY Gaitan CNP   1 tablet at 12/11/20 0609    calcium carbonate (TUMS) chewable tablet 500 mg  500 mg Oral TID PRN Beecher Spurling, APRN - CNP   500 mg at 12/11/20 0843    albuterol sulfate  (90 Base) MCG/ACT inhaler 2 puff  2 puff Inhalation Q6H PRN Sherif Jurado MD   2 puff at 12/06/20 1902    heparin (porcine) injection 4,000 Units  4,000 Units Intracatheter PRN Lisa River MD   4,000 Units at 12/09/20 1224    lidocaine 4 % external patch 1 patch  1 patch Transdermal Daily Beecher Spurling, APRN - CNP   1 patch at 12/10/20 0850    insulin lispro (HUMALOG) injection vial 0-12 Units  0-12 Units Subcutaneous TID WC JAY Bradshaw CNP   2 Units at 12/10/20 1707    insulin lispro (HUMALOG) injection vial 0-6 Units  0-6 Units Subcutaneous Nightly JAY Bradshaw CNP   2 Units at 12/10/20 2236    aspirin chewable tablet 81 mg  81 mg Oral Daily Sherif Jurado MD   81 mg at 12/11/20 0845    carvedilol (COREG) tablet 12.5 mg 12.5 mg Oral BID WC Solo Brink MD   12.5 mg at 12/11/20 0845    [Held by provider] clopidogrel (PLAVIX) tablet 75 mg  75 mg Oral Daily Solo Brink MD   75 mg at 12/06/20 0848    dilTIAZem (CARDIZEM CD) extended release capsule 180 mg  180 mg Oral Daily Solo Brink MD   180 mg at 12/11/20 0845    DULoxetine (CYMBALTA) extended release capsule 30 mg  30 mg Oral Daily Solo Brink MD   30 mg at 12/11/20 0847    gabapentin (NEURONTIN) capsule 100 mg  100 mg Oral TID Solo Brink MD   100 mg at 12/11/20 0845    insulin glargine (LANTUS) injection vial 70 Units  70 Units Subcutaneous Nightly Solo Brink MD   70 Units at 12/10/20 2235    isosorbide mononitrate (IMDUR) extended release tablet 30 mg  30 mg Oral Daily Solo Brink MD   30 mg at 12/11/20 0845    linaclotide (LINZESS) capsule 145 mcg  145 mcg Oral QAM AC Solo Brink MD        losartan (COZAAR) tablet 50 mg  50 mg Oral Daily Solo Brink MD   50 mg at 12/11/20 0845    pantoprazole (PROTONIX) tablet 40 mg  40 mg Oral QAM AC Solo Brink MD   40 mg at 12/11/20 0608    pravastatin (PRAVACHOL) tablet 80 mg  80 mg Oral Nightly Solo Brink MD   80 mg at 12/10/20 2238    pregabalin (LYRICA) capsule 25 mg  25 mg Oral TID Solo Brink MD   25 mg at 12/11/20 0845    QUEtiapine (SEROQUEL) tablet 50 mg  50 mg Oral QPM Solo Brink MD   50 mg at 12/10/20 1707    glycopyrrolate-formoterol (BEVESPI) 9-4.8 MCG/ACT inhaler 2 puff  2 puff Inhalation BID Solo Brink MD   2 puff at 12/10/20 0813    tiZANidine (ZANAFLEX) tablet 2 mg  2 mg Oral TID Solo Brink MD   2 mg at 12/11/20 0845    torsemide (DEMADEX) tablet 100 mg  100 mg Oral Daily Solo Brink MD   100 mg at 12/11/20 0845    traZODone (DESYREL) tablet 150 mg  150 mg Oral Nightly Solo Brink MD   150 mg at 12/10/20 2234    sodium chloride flush 0.9 % injection 10 mL  10 mL Intravenous 2 times per day Heaven Padilla Clemente Frye MD   10 mL at 12/11/20 0847    sodium chloride flush 0.9 % injection 10 mL  10 mL Intravenous PRN Edgar Palmer MD        acetaminophen (TYLENOL) tablet 650 mg  650 mg Oral Q6H PRN Edgar Palmer MD        Or    acetaminophen (TYLENOL) suppository 650 mg  650 mg Rectal Q6H PRN Edgar Palmer MD        polyethylene glycol (GLYCOLAX) packet 17 g  17 g Oral Daily PRN Edgar Palmer MD   17 g at 12/10/20 0850    promethazine (PHENERGAN) tablet 12.5 mg  12.5 mg Oral Q6H PRN Edgar Palmer MD   12.5 mg at 12/07/20 1653    Or    ondansetron (ZOFRAN) injection 4 mg  4 mg Intravenous Q6H PRN Edgar Palmer MD   4 mg at 12/10/20 1711    heparin (porcine) injection 5,000 Units  5,000 Units Subcutaneous 3 times per day Edgar Palmer MD   Stopped at 12/11/20 0606     Allergies   Allergen Reactions    Morphine Nausea And Vomiting      reports that he has been smoking cigarettes. He has a 33.00 pack-year smoking history. He has never used smokeless tobacco. He reports previous alcohol use. He reports that he does not use drugs. Objective:  Exam:   Constitutional:   Vitals:    12/11/20 0016 12/11/20 0021 12/11/20 0600 12/11/20 0823   BP: (!) 149/77  (!) 131/45 (!) 161/72   Pulse: 99  83 91   Resp: 16   16   Temp: 97.9 °F (36.6 °C)  98.2 °F (36.8 °C) 97.2 °F (36.2 °C)   TempSrc: Oral   Oral   SpO2: 96%  100% 91%   Weight:  260 lb 9.3 oz (118.2 kg)     Height:         General appearance:  Normal development and appear in no acute distress. Eye: No icterus. Neck: supple  Cardiovascular:  No lower leg edema with good pulsation. Mental Status:   Oriented to person, place, problem, and time. Memory: Good immediate recall. Intact remote memory  Normal attention span and concentration. Language: intact naming, repeating and fluency   Good fund of Knowledge. Cranial Nerves:   II:  Pupils: equal, round, reactive to light  III,IV,VI: Extra Ocular Movements are intact.  No nystagmus  V: Facial sensation is intact  VII: Facial strength and movements: intact and symmetric  IX: Palate elevation is symmetric  XI: Shoulder shrug is intact  XII: Tongue movements are normal  Musculoskeletal: No weakness in the upper extremity. The same inconsistent leg weakness but at least 4/5. Tone: Normal tone. Reflexes are symmetric in his upper extremity and 1+ in his lower extremity  No tremors. Sensory no changes compared to yesterday  Gait cannot be tested as he just came from LP    Data:  LABS:   Lab Results   Component Value Date     12/11/2020    K 4.9 12/11/2020    CL 89 12/11/2020    CO2 28 12/11/2020    BUN 39 12/11/2020    CREATININE 6.4 12/11/2020    GFRAA 11 12/11/2020    GFRAA >60 05/17/2013    LABGLOM 9 12/11/2020    GLUCOSE 179 12/11/2020    PHOS 3.3 06/10/2020    MG 2.10 04/26/2020    CALCIUM 9.1 12/11/2020     Lab Results   Component Value Date    WBC 10.8 12/11/2020    RBC 3.02 12/11/2020    HGB 9.1 12/11/2020    HCT 27.1 12/11/2020    MCV 89.8 12/11/2020    RDW 13.3 12/11/2020     12/11/2020     Lab Results   Component Value Date    INR 1.00 12/07/2020    PROTIME 11.6 12/07/2020       Neuroimaging was independently reviewed by me and discussed results with the patient  I reviewed blood testing and other test results and discussed results with the patient      Impression: The same. Acute lower extremity weakness with paresthesia and recurrent falling. About the same. Could be combination of chronic diabetic polyneuropathy and chronic lumbar DJD. Superimposed acute demyelinating or axonal sensorimotor polyneuropathy cannot be further excluded.   Awaiting LP results  Diabetes, poorly controlled with underlying polyneuropathy  Hyponatremia  Hyperlipidemia  Obesity  End-stage renal disease      Recommendation  Follow LP results  Continue current supportive care  DVT and GI prophylaxis  Continue hemodialysis  Hydration and follow sodium level  Telemetry  Insulin sliding scale  Blood sugar monitor  Continue current blood pressure medications  DC planning to rehab when medically stable  No further recommendation  We will follow as needed or if  CSF showed marked elevated protein. Marycarmen Broderick MD   [phone]      This dictation was generated by voice recognition computer software. Although all attempts are made to edit the dictation for accuracy, there may be errors in the transcription that are not intended.

## 2020-12-11 NOTE — PROGRESS NOTES
Comprehensive Nutrition Assessment    Type and Reason for Visit:  Initial, RD Nutrition Re-Screen/LOS    Nutrition Recommendations/Plan:   1. Advance diet as medically feasible to carb control  2. Add Glucerna BID when diet advances   3. Consider addition of bowel regimen - no BM this admission, c/o abd pain and bloating  4. Monitor nutrition adequacy, pertinent labs, bowel habits, wt changes, and clinical progress    Nutrition Assessment:  Pt admitted with bilateral leg weakness. Currently NPO, plans for LP today. Pt nutritionally compromised AEB fair appetite and nausea following PO intakes. Pt reports occasional emesis this admission. States only consuming about 1 meal daily. Pt c/o constipation with abdominal pain and bloating this date, no BM this admission. Consider addition of bowel regimen. Pt favorable to adding ONS following diet advancement, drinks at home. Discussed importance of nutrition for healing and maintaining LBM. Will continue to monitor. Malnutrition Assessment:  Malnutrition Status:   At risk for malnutrition (Comment)      Estimated Daily Nutrient Needs:  Energy (kcal):  4065-5375 kcal; Weight Used for Energy Requirements:  Ideal(73 kg)     Protein (g):  73-88 g; Weight Used for Protein Requirements:  Ideal(1.0-1.2 g/kg)        Fluid (ml/day):   ; Method Used for Fluid Requirements:  1 ml/kcal      Nutrition Related Findings:  +nausea, emesis on 12/7, +1 BLE edema, no BM this admission      Wounds:  None       Current Nutrition Therapies:    Diet NPO, After Midnight Exceptions are: Ice Chips, Sips with Meds    Anthropometric Measures:  · Height: 5' 9\" (175.3 cm)  · Current Body Weight: 260 lb (117.9 kg)   · Admission Body Weight: 272 lb (123.4 kg)(bed scale)    · Usual Body Weight: 263 lb (119.3 kg)(bed scale 6/10/20)     · Ideal Body Weight: 160 lbs; % Ideal Body Weight 162.5 %   · BMI: 38.4  · BMI Categories: Obese Class 2 (BMI 35.0 -39.9)       Nutrition Diagnosis:   · Inadequate oral intake related to inadequate protein-energy intake as evidenced by intake 0-25%, nausea, vomiting, constipation      Nutrition Interventions:   Food and/or Nutrient Delivery:  Start Oral Diet, Start Oral Nutrition Supplement  Nutrition Education/Counseling:  No recommendation at this time   Coordination of Nutrition Care:  Continue to monitor while inpatient    Goals:  Pt will consume greater than 50% of meals and ONS w/o GI distrubances       Nutrition Monitoring and Evaluation:   Food/Nutrient Intake Outcomes:  Food and Nutrient Intake, Supplement Intake  Physical Signs/Symptoms Outcomes:  Biochemical Data, Constipation, Nausea or Vomiting, Weight     Discharge Planning:    Continue current diet, Continue Oral Nutrition Supplement     Electronically signed by Steve Paiz MS, RD, LD on 12/11/20 at 10:11 AM EST    Contact: [phone]

## 2020-12-11 NOTE — PLAN OF CARE
Problem: Falls - Risk of:  Goal: Will remain free from falls  Description: Will remain free from falls  Outcome: Ongoing     Problem: Pain:  Goal: Pain level will decrease  Description: Pain level will decrease  12/11/2020 1610 by Pramod You RN  Outcome: Ongoing  1

## 2020-12-11 NOTE — PROGRESS NOTES
Occupational Therapy    Chart reviewed, attempted to see pt for follow up treatment this date; out of room, pt unavailable.     Víctor Loza

## 2020-12-12 LAB
ANION GAP SERPL CALCULATED.3IONS-SCNC: 10 MMOL/L (ref 3–16)
BASOPHILS ABSOLUTE: 0 K/UL (ref 0–0.2)
BASOPHILS RELATIVE PERCENT: 0.4 %
BUN BLDV-MCNC: 25 MG/DL (ref 7–20)
CALCIUM SERPL-MCNC: 9 MG/DL (ref 8.3–10.6)
CHLORIDE BLD-SCNC: 94 MMOL/L (ref 99–110)
CO2: 24 MMOL/L (ref 21–32)
CREAT SERPL-MCNC: 4.5 MG/DL (ref 0.9–1.3)
EOSINOPHILS ABSOLUTE: 0.4 K/UL (ref 0–0.6)
EOSINOPHILS RELATIVE PERCENT: 3.5 %
GFR AFRICAN AMERICAN: 17
GFR NON-AFRICAN AMERICAN: 14
GLUCOSE BLD-MCNC: 154 MG/DL (ref 70–99)
GLUCOSE BLD-MCNC: 172 MG/DL (ref 70–99)
GLUCOSE BLD-MCNC: 175 MG/DL (ref 70–99)
GLUCOSE BLD-MCNC: 192 MG/DL (ref 70–99)
GLUCOSE BLD-MCNC: 228 MG/DL (ref 70–99)
HCT VFR BLD CALC: 28.6 % (ref 40.5–52.5)
HEMOGLOBIN: 9.4 G/DL (ref 13.5–17.5)
LYMPHOCYTES ABSOLUTE: 1.5 K/UL (ref 1–5.1)
LYMPHOCYTES RELATIVE PERCENT: 13.6 %
MCH RBC QN AUTO: 30 PG (ref 26–34)
MCHC RBC AUTO-ENTMCNC: 33 G/DL (ref 31–36)
MCV RBC AUTO: 90.9 FL (ref 80–100)
MONOCYTES ABSOLUTE: 0.8 K/UL (ref 0–1.3)
MONOCYTES RELATIVE PERCENT: 7.3 %
NEUTROPHILS ABSOLUTE: 8.2 K/UL (ref 1.7–7.7)
NEUTROPHILS RELATIVE PERCENT: 75.2 %
PDW BLD-RTO: 13.7 % (ref 12.4–15.4)
PERFORMED ON: ABNORMAL
PLATELET # BLD: 264 K/UL (ref 135–450)
PMV BLD AUTO: 7.3 FL (ref 5–10.5)
POTASSIUM REFLEX MAGNESIUM: 4.8 MMOL/L (ref 3.5–5.1)
RBC # BLD: 3.15 M/UL (ref 4.2–5.9)
SODIUM BLD-SCNC: 128 MMOL/L (ref 136–145)
WBC # BLD: 10.9 K/UL (ref 4–11)

## 2020-12-12 PROCEDURE — 6360000002 HC RX W HCPCS: Performed by: INTERNAL MEDICINE

## 2020-12-12 PROCEDURE — 36415 COLL VENOUS BLD VENIPUNCTURE: CPT

## 2020-12-12 PROCEDURE — 99232 SBSQ HOSP IP/OBS MODERATE 35: CPT | Performed by: PSYCHIATRY & NEUROLOGY

## 2020-12-12 PROCEDURE — 2580000003 HC RX 258: Performed by: INTERNAL MEDICINE

## 2020-12-12 PROCEDURE — 6360000002 HC RX W HCPCS: Performed by: NURSE PRACTITIONER

## 2020-12-12 PROCEDURE — 6370000000 HC RX 637 (ALT 250 FOR IP): Performed by: NURSE PRACTITIONER

## 2020-12-12 PROCEDURE — 2700000000 HC OXYGEN THERAPY PER DAY

## 2020-12-12 PROCEDURE — 6370000000 HC RX 637 (ALT 250 FOR IP): Performed by: INTERNAL MEDICINE

## 2020-12-12 PROCEDURE — 94660 CPAP INITIATION&MGMT: CPT

## 2020-12-12 PROCEDURE — 1200000000 HC SEMI PRIVATE

## 2020-12-12 PROCEDURE — 85025 COMPLETE CBC W/AUTO DIFF WBC: CPT

## 2020-12-12 PROCEDURE — 80048 BASIC METABOLIC PNL TOTAL CA: CPT

## 2020-12-12 PROCEDURE — 94640 AIRWAY INHALATION TREATMENT: CPT

## 2020-12-12 PROCEDURE — 94761 N-INVAS EAR/PLS OXIMETRY MLT: CPT

## 2020-12-12 RX ADMIN — HEPARIN SODIUM 5000 UNITS: 5000 INJECTION INTRAVENOUS; SUBCUTANEOUS at 14:26

## 2020-12-12 RX ADMIN — DILTIAZEM HYDROCHLORIDE 180 MG: 180 CAPSULE, COATED, EXTENDED RELEASE ORAL at 09:50

## 2020-12-12 RX ADMIN — ASPIRIN 81 MG CHEWABLE TABLET 81 MG: 81 TABLET CHEWABLE at 09:50

## 2020-12-12 RX ADMIN — INSULIN GLARGINE 70 UNITS: 100 INJECTION, SOLUTION SUBCUTANEOUS at 21:11

## 2020-12-12 RX ADMIN — ONDANSETRON 4 MG: 2 INJECTION INTRAMUSCULAR; INTRAVENOUS at 21:25

## 2020-12-12 RX ADMIN — PREGABALIN 25 MG: 25 CAPSULE ORAL at 21:22

## 2020-12-12 RX ADMIN — TORSEMIDE 100 MG: 100 TABLET ORAL at 09:50

## 2020-12-12 RX ADMIN — TRAZODONE HYDROCHLORIDE 150 MG: 50 TABLET ORAL at 21:21

## 2020-12-12 RX ADMIN — PROCHLORPERAZINE EDISYLATE 5 MG: 5 INJECTION INTRAMUSCULAR; INTRAVENOUS at 06:18

## 2020-12-12 RX ADMIN — ONDANSETRON 4 MG: 2 INJECTION INTRAMUSCULAR; INTRAVENOUS at 09:56

## 2020-12-12 RX ADMIN — QUETIAPINE FUMARATE 50 MG: 25 TABLET ORAL at 21:21

## 2020-12-12 RX ADMIN — PRAVASTATIN SODIUM 80 MG: 80 TABLET ORAL at 21:21

## 2020-12-12 RX ADMIN — TIZANIDINE 2 MG: 4 TABLET ORAL at 21:25

## 2020-12-12 RX ADMIN — INSULIN LISPRO 1 UNITS: 100 INJECTION, SOLUTION INTRAVENOUS; SUBCUTANEOUS at 21:11

## 2020-12-12 RX ADMIN — ISOSORBIDE MONONITRATE 30 MG: 30 TABLET, EXTENDED RELEASE ORAL at 09:50

## 2020-12-12 RX ADMIN — ONDANSETRON 4 MG: 2 INJECTION INTRAMUSCULAR; INTRAVENOUS at 03:07

## 2020-12-12 RX ADMIN — GLYCOPYRROLATE AND FORMOTEROL FUMARATE 2 PUFF: 9; 4.8 AEROSOL, METERED RESPIRATORY (INHALATION) at 19:31

## 2020-12-12 RX ADMIN — GABAPENTIN 100 MG: 100 CAPSULE ORAL at 09:50

## 2020-12-12 RX ADMIN — PREGABALIN 25 MG: 25 CAPSULE ORAL at 09:51

## 2020-12-12 RX ADMIN — GABAPENTIN 100 MG: 100 CAPSULE ORAL at 14:25

## 2020-12-12 RX ADMIN — GABAPENTIN 100 MG: 100 CAPSULE ORAL at 21:22

## 2020-12-12 RX ADMIN — INSULIN LISPRO 2 UNITS: 100 INJECTION, SOLUTION INTRAVENOUS; SUBCUTANEOUS at 09:52

## 2020-12-12 RX ADMIN — SODIUM CHLORIDE, PRESERVATIVE FREE 10 ML: 5 INJECTION INTRAVENOUS at 09:51

## 2020-12-12 RX ADMIN — CARVEDILOL 12.5 MG: 6.25 TABLET, FILM COATED ORAL at 09:50

## 2020-12-12 RX ADMIN — OXYCODONE HYDROCHLORIDE AND ACETAMINOPHEN 1 TABLET: 10; 325 TABLET ORAL at 09:55

## 2020-12-12 RX ADMIN — OXYCODONE HYDROCHLORIDE AND ACETAMINOPHEN 1 TABLET: 10; 325 TABLET ORAL at 15:57

## 2020-12-12 RX ADMIN — PREGABALIN 25 MG: 25 CAPSULE ORAL at 14:25

## 2020-12-12 RX ADMIN — HEPARIN SODIUM 5000 UNITS: 5000 INJECTION INTRAVENOUS; SUBCUTANEOUS at 21:10

## 2020-12-12 RX ADMIN — TIZANIDINE 2 MG: 4 TABLET ORAL at 14:25

## 2020-12-12 RX ADMIN — DULOXETINE HYDROCHLORIDE 30 MG: 30 CAPSULE, DELAYED RELEASE ORAL at 09:50

## 2020-12-12 RX ADMIN — SODIUM CHLORIDE, PRESERVATIVE FREE 10 ML: 5 INJECTION INTRAVENOUS at 21:11

## 2020-12-12 RX ADMIN — LOSARTAN POTASSIUM 50 MG: 25 TABLET, FILM COATED ORAL at 09:50

## 2020-12-12 RX ADMIN — PANTOPRAZOLE SODIUM 40 MG: 40 TABLET, DELAYED RELEASE ORAL at 06:18

## 2020-12-12 RX ADMIN — OXYCODONE HYDROCHLORIDE AND ACETAMINOPHEN 1 TABLET: 10; 325 TABLET ORAL at 22:00

## 2020-12-12 RX ADMIN — OXYCODONE HYDROCHLORIDE AND ACETAMINOPHEN 1 TABLET: 10; 325 TABLET ORAL at 03:07

## 2020-12-12 RX ADMIN — TIZANIDINE 2 MG: 4 TABLET ORAL at 09:51

## 2020-12-12 RX ADMIN — INSULIN LISPRO 4 UNITS: 100 INJECTION, SOLUTION INTRAVENOUS; SUBCUTANEOUS at 14:26

## 2020-12-12 RX ADMIN — GLYCOPYRROLATE AND FORMOTEROL FUMARATE 2 PUFF: 9; 4.8 AEROSOL, METERED RESPIRATORY (INHALATION) at 11:30

## 2020-12-12 RX ADMIN — PROCHLORPERAZINE EDISYLATE 5 MG: 5 INJECTION INTRAMUSCULAR; INTRAVENOUS at 15:57

## 2020-12-12 RX ADMIN — HEPARIN SODIUM 5000 UNITS: 5000 INJECTION INTRAVENOUS; SUBCUTANEOUS at 06:18

## 2020-12-12 ASSESSMENT — PAIN DESCRIPTION - FREQUENCY
FREQUENCY: CONTINUOUS
FREQUENCY: CONTINUOUS

## 2020-12-12 ASSESSMENT — PAIN DESCRIPTION - DESCRIPTORS
DESCRIPTORS: ACHING;DISCOMFORT
DESCRIPTORS: ACHING;DISCOMFORT

## 2020-12-12 ASSESSMENT — PAIN DESCRIPTION - PAIN TYPE
TYPE: CHRONIC PAIN

## 2020-12-12 ASSESSMENT — PAIN SCALES - GENERAL
PAINLEVEL_OUTOF10: 6
PAINLEVEL_OUTOF10: 10
PAINLEVEL_OUTOF10: 8
PAINLEVEL_OUTOF10: 0
PAINLEVEL_OUTOF10: 7

## 2020-12-12 ASSESSMENT — PAIN DESCRIPTION - PROGRESSION: CLINICAL_PROGRESSION: NOT CHANGED

## 2020-12-12 ASSESSMENT — PAIN DESCRIPTION - ONSET: ONSET: ON-GOING

## 2020-12-12 ASSESSMENT — PAIN DESCRIPTION - LOCATION
LOCATION: BACK
LOCATION: BACK

## 2020-12-12 NOTE — PROGRESS NOTES
Patient was noted to have been in SB on the monitor with a HR of 30. The patient was asleep in bed with his bipap on and was easy to arouse. Shortly after bedside checking, CMU notified writer of a 4 second pause also noted on the monitor. However the patient remains asleep and asymptomatic. MD made aware.  Electronically signed by Art Lewis RN on 12/12/2020 at 5:54 PM      Cardiology consult to be ordered by MD

## 2020-12-12 NOTE — PROGRESS NOTES
Hospitalist Progress Note      PCP: Leobardo Negrete MD    Date of Admission: 12/4/2020    Chief Complaint: Bilateral lower extremity weakness, numbness, and inability to walk for 2 days. Hospital Course: 44 y.o. male who presented to [facility] with above complaint. He has a history of diabetes and neuropathy. He does have lower back pain for long time. He has been feeling more numbness for last 2 days and today he could not get up and walk. He felt like he does not have leg. He fell couple of times and there is no apparent injuries. Currently he does not feel below the mid calf bilateral lower extremities. And he is able to move some extent lower extremities but unable to walk. Subjective:    Sitting bedside, asking about discharge planning. No sig change in his BLE weakness.    Denied for ARU, approved for SNF    Medications:  Reviewed    Infusion Medications   Scheduled Medications    sodium chloride  500 mL Intravenous Once    darbepoetin siddharth-polysorbate  25 mcg Intravenous Q7 Days    lidocaine  1 patch Transdermal Daily    insulin lispro  0-12 Units Subcutaneous TID WC    insulin lispro  0-6 Units Subcutaneous Nightly    aspirin  81 mg Oral Daily    carvedilol  12.5 mg Oral BID WC    [Held by provider] clopidogrel  75 mg Oral Daily    dilTIAZem  180 mg Oral Daily    DULoxetine  30 mg Oral Daily    gabapentin  100 mg Oral TID    insulin glargine  70 Units Subcutaneous Nightly    isosorbide mononitrate  30 mg Oral Daily    linaclotide  145 mcg Oral QAM AC    losartan  50 mg Oral Daily    pantoprazole  40 mg Oral QAM AC    pravastatin  80 mg Oral Nightly    pregabalin  25 mg Oral TID    QUEtiapine  50 mg Oral QPM    glycopyrrolate-formoterol  2 puff Inhalation BID    tiZANidine  2 mg Oral TID    torsemide  100 mg Oral Daily    traZODone  150 mg Oral Nightly    sodium chloride flush  10 mL Intravenous 2 times per day    heparin (porcine)  5,000 Units Subcutaneous 3 times per day     PRN Meds: prochlorperazine, oxyCODONE-acetaminophen, calcium carbonate, albuterol sulfate HFA, heparin (porcine), sodium chloride flush, acetaminophen **OR** acetaminophen, polyethylene glycol, promethazine **OR** ondansetron      Intake/Output Summary (Last 24 hours) at 12/12/2020 1115  Last data filed at 12/12/2020 0620  Gross per 24 hour   Intake 1170 ml   Output 1350 ml   Net -180 ml       Physical Exam Performed:    BP (!) 149/77   Pulse 84   Temp 98.2 °F (36.8 °C)   Resp 18   Ht 5' 9\" (1.753 m)   Wt 274 lb 0.5 oz (124.3 kg)   SpO2 91%   BMI 40.47 kg/m²     General appearance: No apparent distress, appears stated age and cooperative. HEENT: Pupils equal, round, and reactive to light. Conjunctivae/corneas clear. Neck: Supple, with full range of motion. No jugular venous distention. Trachea midline. Respiratory:  Normal respiratory effort. Clear to auscultation, bilaterally without Rales/Wheezes/Rhonchi. Cardiovascular: Regular rate and rhythm with normal S1/S2 without murmurs, rubs or gallops. Abdomen: Soft, non-tender, non-distended with normal bowel sounds. Musculoskeletal: No clubbing, cyanosis or edema bilaterally. Full range of motion without deformity. Skin: Skin color, texture, turgor normal.  No rashes or lesions. Neurologic:  Abnormal sensation below the knee bilaterally. 3/5 weakness BLE.    Psychiatric: Alert and oriented, thought content appropriate, normal insight  Capillary Refill: Brisk,< 3 seconds   Peripheral Pulses: +2 palpable, equal bilaterally       Labs:   Recent Labs     12/10/20  0922 12/11/20 0449 12/12/20  0944   WBC 10.8 10.8 10.9   HGB 10.6* 9.1* 9.4*   HCT 32.0* 27.1* 28.6*    243 264     Recent Labs     12/10/20  2044 12/11/20  0449 12/12/20  0944   * 127* 128*   K 5.5* 4.9 4.8   CL 87* 89* 94*   CO2 28 28 24   BUN 35* 39* 25*   CREATININE 5.8* 6.4* 4.5*   CALCIUM 9.5 9.1 9.0     No results for input(s): AST, ALT, BILIDIR, BILITOT, ALKPHOS in the last 72 hours. No results for input(s): INR in the last 72 hours. No results for input(s): Negin Ripper in the last 72 hours. Urinalysis:      Lab Results   Component Value Date    NITRU Negative 06/05/2020    WBCUA 10-20 06/05/2020    BACTERIA 2+ 06/05/2020    RBCUA 0-2 06/05/2020    BLOODU TRACE-INTACT 06/05/2020    SPECGRAV 1.020 06/05/2020    GLUCOSEU 250 06/05/2020       Radiology:  IR LUMBAR PUNCTURE FOR DIAGNOSIS   Final Result   Successful fluoroscopic-guided lumbar puncture. CT ABDOMEN PELVIS W IV CONTRAST Additional Contrast? Radiologist Recommendation   Final Result   No acute abdominopelvic abnormality. MRI THORACIC SPINE WO CONTRAST   Final Result   No acute thoracic spine abnormality. No thoracic spinal canal or neural   foraminal stenosis. MRI CERVICAL SPINE WO CONTRAST   Final Result   No acute cervical spine abnormality. Mild degenerative spondylosis without significant cervical spinal canal   stenosis or high-grade neural foraminal stenosis. Mild left C4-C5 and right C6-C7 neural foraminal stenosis. MRI LUMBAR SPINE WO CONTRAST   Final Result   1. Mild L1-2 and L5-S1 degenerative disc height loss. 2. Moderate right L5 neural foraminal narrowing secondary to a right   foraminal L5-S1 disc protrusion. 3. Mild L1-2 spinal canal stenosis. CT ABDOMEN PELVIS WO CONTRAST   Final Result   1. No CT evidence of an acute intra-abdominal or intrapelvic process. 2.  No findings to suggest acute appendicitis; no ureter calculus or   hydronephrosis. 3. Probably reactive lymph nodes in the right upper quadrant, unchanged from   prior study. 4.  Mild extrahepatic bile duct dilatation status post cholecystectomy   typical of reservoir effect. 5.  Calcific atherosclerotic disease aorta. 6. Small, fat containing umbilical hernia. 7. Benign left adrenal adenoma requires no additional evaluation or follow-up.          IR LUMBAR PUNCTURE FOR DIAGNOSIS    (Results Pending)           Assessment/Plan:    Active Hospital Problems    Diagnosis    GBS (Guillain-[place] syndrome) ([facility]) [G61.0]    Bilateral leg weakness [R29.898]    ESRD (end stage renal disease) on dialysis ([facility] [N18.6, Z99.2]    DM (diabetes mellitus), secondary, uncontrolled, w/neurologic complic ([facility] [R21.18, Q63.57]     Bilateral lower extremity weakness numbness - unclear etiology. Exam seems inconsistent.  - MRI shows L5-S1 disc protrusion.  - discussed with Ortho Spine - recommended MRI cervical and thoracic spine to rule out epidural abscess, given elevated ESR and CRP. This was WNL  - neurology consulted and following  - s/p LP 12/11/2020- resume Plavix     ESRD on HD   - nephrology consulted and following.  - MWF HD     Chronic back pain   - continue home medication and muscle relaxant  - temp increase to Percocet from 7.5 to 10mg/325. Discussed that we will not provide this change for him at d/c since he follows with pain management .     DM2 with neuropathy  - continue Lantus and medium dose SSI. - continue gabapentin and Cymbalta. Hyponatremia   - nephrology following.  - hold Celexa.     Anemia - likely due to ESRD.     Chronic diastolic CHF - no evidence of fluid overload     COPD - stable. Continue home inhalers     CAD -  Continue Coreg, aspirin, Plavix and Pravachol      DVT Prophylaxis: Subcutaneous Heparin  Diet: DIET GENERAL; Daily Fluid Restriction: 2000 ml  Code Status: Full Code    PT/OT Eval Status: ordered    Dispo - Pending neuro recs. Poss later today or tomorrow.      Yoly Dejesus, APRN - CNP

## 2020-12-12 NOTE — PROGRESS NOTES
12/12/20 0115   NIV Type   $NIV $Daily Charge   Skin Assessment Clean, dry, & intact   Skin Protection for O2 Device Yes   NIV Started/Stopped On   Equipment Type V60   Mode CPAP   Mask Type Full face mask   Mask Size Medium   Settings/Measurements   CPAP/EPAP 10 cmH2O   Resp 18   FiO2  30 %   Vt Exhaled 661 mL   Minute Volume 11.1 Liters   Mask Leak (lpm) 12 lpm   Comfort Level Good   Using Accessory Muscles No

## 2020-12-12 NOTE — PROGRESS NOTES
Zachariah Muñoz  Neurology Follow-up  [facility]    Date of Service: 12/12/2020    Subjective:   CC: Follow up today regarding: lower leg weakness and falling. Events noted. Chart and lab reviewed. The patient had his LP which was unremarkable. No loculation of CSF protein. He feels better. No new symptoms today. Slightly weak in his leg. Awaiting discharge to rehab. Other review of system was unremarkable. ROS : A 10-12 system review obtained and updated today and is unremarkable except as mentioned  in my interval history. family history includes Alcohol Abuse in his brother; Cancer in his sister and sister; Diabetes in his mother; Heart Disease in his father, sister, and sister; Kidney Disease in his sister; Obesity in his sister and sister.     Past Medical History:   Diagnosis Date    Ambulatory dysfunction     walker for long distances, SOB with distance    Aortic stenosis     echo 2017    Arthritis     hands and hips    Asthma     Bilateral hilar adenopathy syndrome 6/3/2013    CAD (coronary artery disease)     Dr. Pablo Segovia [facility]) 04/19/2019    EF= 43%    CH[facility] (congestive heart failure) ([facility])     Chronic pain     COPD (chronic obstructive pulmonary disease) ([address]     pulmonology Dr. Jeff Thomas    Depression     Diabetes mellitus ([address]     borderline    Difficult intravenous access     Emphysema of lung ([address]     ESRD (end stage renal disease) on dialysis ([address]     MWF    Fear of needles     Gastric ulcer     GERD (gastroesophageal reflux disease)     Heart valve problem     bicuspic valve    Hemodialysis patient ([address]     History of spinal fracture     work incident    Hx of blood clots     Bilateral lower extremities; stents in place    Hyperlipidemia     Hypertension     MI (myocardial infarction) ([address] 2019    has had 9 MIs. 2019 was the last    Neuromuscular disorder ([address]     due to CVA    Numbness and tingling in left arm     from fistula    Pneumonia     PONV (postoperative nausea and vomiting)     Prolonged emergence from general anesthesia     States requires more medication than most people    Sleep apnea     Uses CPAP    Stroke ([facility]     7mm thalamic cva 2017 deficts left side, left side weakness    TIA (transient ischemic attack)     Unspecified diseases of blood and blood-forming organs      Current Facility-Administered Medications   Medication Dose Route Frequency Provider Last Rate Last Admin    0.9 % sodium chloride bolus  500 mL Intravenous Once JAY Lee CNP        darbepoetin siddharth-polysorbate (ARANESP) injection 25 mcg  25 mcg Intravenous Q7 Days Ji Ortiz MD   25 mcg at 12/09/20 1224    prochlorperazine (COMPAZINE) injection 5 mg  5 mg Intravenous Q6H PRN JYA Kumari NP   5 mg at 12/12/20 0618    oxyCODONE-acetaminophen (PERCOCET)  MG per tablet 1 tablet  1 tablet Oral N6J PRN JAY Barreto CNP   1 tablet at 12/12/20 0955    calcium carbonate (TUMS) chewable tablet 500 mg  500 mg Oral TID PRN JAY Lee CNP   500 mg at 12/11/20 1724    albuterol sulfate  (90 Base) MCG/ACT inhaler 2 puff  2 puff Inhalation Q6H PRN Pepe Valadez MD   2 puff at 12/06/20 1902    heparin (porcine) injection 4,000 Units  4,000 Units Intracatheter PRN Ji Ortiz MD   4,000 Units at 12/09/20 1224    lidocaine 4 % external patch 1 patch  1 patch Transdermal Daily JAY Lee CNP   1 patch at 12/12/20 0950    insulin lispro (HUMALOG) injection vial 0-12 Units  0-12 Units Subcutaneous TID  JAY Zabala CNP   2 Units at 12/12/20 4162    insulin lispro (HUMALOG) injection vial 0-6 Units  0-6 Units Subcutaneous Nightly JAY Zabala CNP   1 Units at 12/11/20 2248    aspirin chewable tablet 81 mg  81 mg Oral Daily Pepe Valadez MD   81 mg at 12/12/20 0950    carvedilol (COREG) tablet 12.5 mg  12.5 mg Oral BID  Hodan Yamila Fagan MD   12.5 mg at 12/12/20 0950    clopidogrel (PLAVIX) tablet 75 mg  75 mg Oral Daily Nicholas Sanford MD   75 mg at 12/06/20 0848    dilTIAZem (CARDIZEM CD) extended release capsule 180 mg  180 mg Oral Daily Nicholas Sanford MD   180 mg at 12/12/20 0950    DULoxetine (CYMBALTA) extended release capsule 30 mg  30 mg Oral Daily Nicholas Sanford MD   30 mg at 12/12/20 0950    gabapentin (NEURONTIN) capsule 100 mg  100 mg Oral TID Nicholas Sanford MD   100 mg at 12/12/20 0950    insulin glargine (LANTUS) injection vial 70 Units  70 Units Subcutaneous Nightly Nicholas Sanford MD   70 Units at 12/11/20 2247    isosorbide mononitrate (IMDUR) extended release tablet 30 mg  30 mg Oral Daily Nicholas Sanford MD   30 mg at 12/12/20 0950    linaclotide (LINZESS) capsule 145 mcg  145 mcg Oral QAM AC Nicholas Sanford MD        losartan (COZAAR) tablet 50 mg  50 mg Oral Daily Nicholas Sanford MD   50 mg at 12/12/20 0950    pantoprazole (PROTONIX) tablet 40 mg  40 mg Oral QAM AC Nicholas Sanford MD   40 mg at 12/12/20 0618    pravastatin (PRAVACHOL) tablet 80 mg  80 mg Oral Nightly Nicholas Sanford MD   80 mg at 12/11/20 2245    pregabalin (LYRICA) capsule 25 mg  25 mg Oral TID Nicholas Sanford MD   25 mg at 12/12/20 0951    QUEtiapine (SEROQUEL) tablet 50 mg  50 mg Oral QPM Nicholas Sanford MD   50 mg at 12/11/20 1734    glycopyrrolate-formoterol (BEVESPI) 9-4.8 MCG/ACT inhaler 2 puff  2 puff Inhalation BID Nicholas Sanford MD   2 puff at 12/12/20 1130    tiZANidine (ZANAFLEX) tablet 2 mg  2 mg Oral TID Nicholas Sanford MD   2 mg at 12/12/20 0951    torsemide (DEMADEX) tablet 100 mg  100 mg Oral Daily Nicholas Sanford MD   100 mg at 12/12/20 0950    traZODone (DESYREL) tablet 150 mg  150 mg Oral Nightly Nicholas Sanford MD   150 mg at 12/11/20 2246    sodium chloride flush 0.9 % injection 10 mL  10 mL Intravenous 2 times per day Nicholas Sanford MD   10 mL at 12/11/20 0046    sodium symmetric  XII: Tongue movements are normal  Musculoskeletal: No new weakness today. No new sensory loss  Normal tone      Data:  LABS:   Lab Results   Component Value Date     12/12/2020    K 4.8 12/12/2020    CL 94 12/12/2020    CO2 24 12/12/2020    BUN 25 12/12/2020    CREATININE 4.5 12/12/2020    GFRAA 17 12/12/2020    GFRAA >60 05/17/2013    LABGLOM 14 12/12/2020    GLUCOSE 154 12/12/2020    PHOS 3.3 06/10/2020    MG 2.10 04/26/2020    CALCIUM 9.0 12/12/2020     Lab Results   Component Value Date    WBC 10.9 12/12/2020    RBC 3.15 12/12/2020    HGB 9.4 12/12/2020    HCT 28.6 12/12/2020    MCV 90.9 12/12/2020    RDW 13.7 12/12/2020     12/12/2020     Lab Results   Component Value Date    INR 1.00 12/07/2020    PROTIME 11.6 12/07/2020       I reviewed blood testing and other test results and discussed results with the patient      Impression: No change  Acute lower extremity weakness with paresthesia and recurrent falling. About the same. Could be combination of chronic diabetic polyneuropathy and chronic lumbar DJD. CSF is not consistent with severe demyelinating polyneuropathy. Diabetes, poorly controlled with underlying polyneuropathy  Hyponatremia  Hyperlipidemia  Obesity  End-stage renal disease      Recommendation    Continue current supportive care  Agree with rehab  Insulin sliding scale  Blood sugar control  Follow-up with endocrinology outpatient for diabetic management  Aspirin  Statin  Plavix  Continue current blood pressure medications  No further recommendation  We will sign off. Hari Escobedo MD   [phone]      This dictation was generated by voice recognition computer software. Although all attempts are made to edit the dictation for accuracy, there may be errors in the transcription that are not intended.

## 2020-12-12 NOTE — PROGRESS NOTES
12/11/20 2019   NIV Type   Skin Protection for O2 Device Yes   Equipment Type V60   Mode CPAP   Mask Type Full face mask   Mask Size Medium   Settings/Measurements   CPAP/EPAP 10 cmH2O   Resp 9   FiO2  30 %   Vt Exhaled 589 mL   Minute Volume 5.7 Liters   Mask Leak (lpm) 39 lpm   Comfort Level Good   Using Accessory Muscles No   SpO2 96

## 2020-12-13 PROBLEM — Z95.2 S/P TAVR (TRANSCATHETER AORTIC VALVE REPLACEMENT): Status: ACTIVE | Noted: 2019-10-19

## 2020-12-13 LAB
ANION GAP SERPL CALCULATED.3IONS-SCNC: 10 MMOL/L (ref 3–16)
ANISOCYTOSIS: ABNORMAL
ATYPICAL LYMPHOCYTE RELATIVE PERCENT: 1 % (ref 0–6)
BANDED NEUTROPHILS RELATIVE PERCENT: 6 % (ref 0–7)
BASOPHILS ABSOLUTE: 0 K/UL (ref 0–0.2)
BASOPHILS RELATIVE PERCENT: 0 %
BUN BLDV-MCNC: 36 MG/DL (ref 7–20)
CALCIUM SERPL-MCNC: 9 MG/DL (ref 8.3–10.6)
CHLORIDE BLD-SCNC: 88 MMOL/L (ref 99–110)
CO2: 25 MMOL/L (ref 21–32)
CREAT SERPL-MCNC: 5.6 MG/DL (ref 0.9–1.3)
EOSINOPHILS ABSOLUTE: 0.6 K/UL (ref 0–0.6)
EOSINOPHILS RELATIVE PERCENT: 6 %
GFR AFRICAN AMERICAN: 13
GFR NON-AFRICAN AMERICAN: 11
GLUCOSE BLD-MCNC: 112 MG/DL (ref 70–99)
GLUCOSE BLD-MCNC: 186 MG/DL (ref 70–99)
GLUCOSE BLD-MCNC: 188 MG/DL (ref 70–99)
GLUCOSE BLD-MCNC: 191 MG/DL (ref 70–99)
GLUCOSE BLD-MCNC: 200 MG/DL (ref 70–99)
HCT VFR BLD CALC: 30.2 % (ref 40.5–52.5)
HEMOGLOBIN: 10 G/DL (ref 13.5–17.5)
LYMPHOCYTES ABSOLUTE: 1.1 K/UL (ref 1–5.1)
LYMPHOCYTES RELATIVE PERCENT: 10 %
MCH RBC QN AUTO: 29.8 PG (ref 26–34)
MCHC RBC AUTO-ENTMCNC: 32.9 G/DL (ref 31–36)
MCV RBC AUTO: 90.4 FL (ref 80–100)
METAMYELOCYTES RELATIVE PERCENT: 2 %
MONOCYTES ABSOLUTE: 0.6 K/UL (ref 0–1.3)
MONOCYTES RELATIVE PERCENT: 6 %
MYELOCYTE PERCENT: 0 %
NEUTROPHILS ABSOLUTE: 8 K/UL (ref 1.7–7.7)
NEUTROPHILS RELATIVE PERCENT: 69 %
PDW BLD-RTO: 13.5 % (ref 12.4–15.4)
PERFORMED ON: ABNORMAL
PLATELET # BLD: 273 K/UL (ref 135–450)
PLATELET SLIDE REVIEW: ADEQUATE
PMV BLD AUTO: 7.1 FL (ref 5–10.5)
POLYCHROMASIA: ABNORMAL
POTASSIUM REFLEX MAGNESIUM: 4.6 MMOL/L (ref 3.5–5.1)
RBC # BLD: 3.34 M/UL (ref 4.2–5.9)
SLIDE REVIEW: ABNORMAL
SODIUM BLD-SCNC: 123 MMOL/L (ref 136–145)
WBC # BLD: 10.4 K/UL (ref 4–11)

## 2020-12-13 PROCEDURE — 2580000003 HC RX 258: Performed by: INTERNAL MEDICINE

## 2020-12-13 PROCEDURE — 6370000000 HC RX 637 (ALT 250 FOR IP): Performed by: NURSE PRACTITIONER

## 2020-12-13 PROCEDURE — 99223 1ST HOSP IP/OBS HIGH 75: CPT | Performed by: INTERNAL MEDICINE

## 2020-12-13 PROCEDURE — 97110 THERAPEUTIC EXERCISES: CPT

## 2020-12-13 PROCEDURE — 6360000002 HC RX W HCPCS: Performed by: INTERNAL MEDICINE

## 2020-12-13 PROCEDURE — 94660 CPAP INITIATION&MGMT: CPT

## 2020-12-13 PROCEDURE — 1200000000 HC SEMI PRIVATE

## 2020-12-13 PROCEDURE — 36415 COLL VENOUS BLD VENIPUNCTURE: CPT

## 2020-12-13 PROCEDURE — 94761 N-INVAS EAR/PLS OXIMETRY MLT: CPT

## 2020-12-13 PROCEDURE — 97116 GAIT TRAINING THERAPY: CPT

## 2020-12-13 PROCEDURE — 94640 AIRWAY INHALATION TREATMENT: CPT

## 2020-12-13 PROCEDURE — 97530 THERAPEUTIC ACTIVITIES: CPT

## 2020-12-13 PROCEDURE — 85025 COMPLETE CBC W/AUTO DIFF WBC: CPT

## 2020-12-13 PROCEDURE — 80048 BASIC METABOLIC PNL TOTAL CA: CPT

## 2020-12-13 PROCEDURE — 6370000000 HC RX 637 (ALT 250 FOR IP): Performed by: INTERNAL MEDICINE

## 2020-12-13 PROCEDURE — 6360000002 HC RX W HCPCS: Performed by: NURSE PRACTITIONER

## 2020-12-13 PROCEDURE — 2700000000 HC OXYGEN THERAPY PER DAY

## 2020-12-13 RX ADMIN — ANTACID TABLETS 500 MG: 500 TABLET, CHEWABLE ORAL at 02:14

## 2020-12-13 RX ADMIN — GLYCOPYRROLATE AND FORMOTEROL FUMARATE 2 PUFF: 9; 4.8 AEROSOL, METERED RESPIRATORY (INHALATION) at 19:38

## 2020-12-13 RX ADMIN — INSULIN LISPRO 2 UNITS: 100 INJECTION, SOLUTION INTRAVENOUS; SUBCUTANEOUS at 13:10

## 2020-12-13 RX ADMIN — INSULIN GLARGINE 70 UNITS: 100 INJECTION, SOLUTION SUBCUTANEOUS at 20:43

## 2020-12-13 RX ADMIN — CLOPIDOGREL BISULFATE 75 MG: 75 TABLET ORAL at 08:45

## 2020-12-13 RX ADMIN — TRAZODONE HYDROCHLORIDE 150 MG: 50 TABLET ORAL at 20:42

## 2020-12-13 RX ADMIN — PROCHLORPERAZINE EDISYLATE 5 MG: 5 INJECTION INTRAMUSCULAR; INTRAVENOUS at 02:14

## 2020-12-13 RX ADMIN — TIZANIDINE 2 MG: 4 TABLET ORAL at 20:42

## 2020-12-13 RX ADMIN — POLYETHYLENE GLYCOL 3350 17 G: 17 POWDER, FOR SOLUTION ORAL at 18:10

## 2020-12-13 RX ADMIN — OXYCODONE HYDROCHLORIDE AND ACETAMINOPHEN 1 TABLET: 10; 325 TABLET ORAL at 05:16

## 2020-12-13 RX ADMIN — PREGABALIN 25 MG: 25 CAPSULE ORAL at 20:42

## 2020-12-13 RX ADMIN — GABAPENTIN 100 MG: 100 CAPSULE ORAL at 13:09

## 2020-12-13 RX ADMIN — SODIUM CHLORIDE, PRESERVATIVE FREE 10 ML: 5 INJECTION INTRAVENOUS at 20:43

## 2020-12-13 RX ADMIN — ISOSORBIDE MONONITRATE 30 MG: 30 TABLET, EXTENDED RELEASE ORAL at 08:45

## 2020-12-13 RX ADMIN — INSULIN LISPRO 1 UNITS: 100 INJECTION, SOLUTION INTRAVENOUS; SUBCUTANEOUS at 20:43

## 2020-12-13 RX ADMIN — ONDANSETRON 4 MG: 2 INJECTION INTRAMUSCULAR; INTRAVENOUS at 05:16

## 2020-12-13 RX ADMIN — HEPARIN SODIUM 5000 UNITS: 5000 INJECTION INTRAVENOUS; SUBCUTANEOUS at 05:16

## 2020-12-13 RX ADMIN — PROCHLORPERAZINE EDISYLATE 5 MG: 5 INJECTION INTRAMUSCULAR; INTRAVENOUS at 13:17

## 2020-12-13 RX ADMIN — TIZANIDINE 2 MG: 4 TABLET ORAL at 13:08

## 2020-12-13 RX ADMIN — SODIUM CHLORIDE, PRESERVATIVE FREE 10 ML: 5 INJECTION INTRAVENOUS at 08:45

## 2020-12-13 RX ADMIN — ANTACID TABLETS 500 MG: 500 TABLET, CHEWABLE ORAL at 14:34

## 2020-12-13 RX ADMIN — PREGABALIN 25 MG: 25 CAPSULE ORAL at 13:08

## 2020-12-13 RX ADMIN — GLYCOPYRROLATE AND FORMOTEROL FUMARATE 2 PUFF: 9; 4.8 AEROSOL, METERED RESPIRATORY (INHALATION) at 08:03

## 2020-12-13 RX ADMIN — GABAPENTIN 100 MG: 100 CAPSULE ORAL at 20:41

## 2020-12-13 RX ADMIN — ONDANSETRON 4 MG: 2 INJECTION INTRAMUSCULAR; INTRAVENOUS at 20:41

## 2020-12-13 RX ADMIN — PREGABALIN 25 MG: 25 CAPSULE ORAL at 08:45

## 2020-12-13 RX ADMIN — INSULIN LISPRO 2 UNITS: 100 INJECTION, SOLUTION INTRAVENOUS; SUBCUTANEOUS at 18:04

## 2020-12-13 RX ADMIN — ASPIRIN 81 MG CHEWABLE TABLET 81 MG: 81 TABLET CHEWABLE at 08:45

## 2020-12-13 RX ADMIN — OXYCODONE HYDROCHLORIDE AND ACETAMINOPHEN 1 TABLET: 10; 325 TABLET ORAL at 20:42

## 2020-12-13 RX ADMIN — HEPARIN SODIUM 5000 UNITS: 5000 INJECTION INTRAVENOUS; SUBCUTANEOUS at 20:41

## 2020-12-13 RX ADMIN — PANTOPRAZOLE SODIUM 40 MG: 40 TABLET, DELAYED RELEASE ORAL at 05:16

## 2020-12-13 RX ADMIN — GABAPENTIN 100 MG: 100 CAPSULE ORAL at 08:45

## 2020-12-13 RX ADMIN — PRAVASTATIN SODIUM 80 MG: 80 TABLET ORAL at 20:43

## 2020-12-13 RX ADMIN — TIZANIDINE 2 MG: 4 TABLET ORAL at 08:45

## 2020-12-13 RX ADMIN — LOSARTAN POTASSIUM 50 MG: 25 TABLET, FILM COATED ORAL at 08:45

## 2020-12-13 RX ADMIN — DULOXETINE HYDROCHLORIDE 30 MG: 30 CAPSULE, DELAYED RELEASE ORAL at 08:45

## 2020-12-13 RX ADMIN — QUETIAPINE FUMARATE 50 MG: 25 TABLET ORAL at 20:54

## 2020-12-13 RX ADMIN — TORSEMIDE 100 MG: 100 TABLET ORAL at 08:45

## 2020-12-13 RX ADMIN — OXYCODONE HYDROCHLORIDE AND ACETAMINOPHEN 1 TABLET: 10; 325 TABLET ORAL at 13:17

## 2020-12-13 ASSESSMENT — PAIN DESCRIPTION - PAIN TYPE
TYPE: CHRONIC PAIN
TYPE: CHRONIC PAIN

## 2020-12-13 ASSESSMENT — PAIN DESCRIPTION - LOCATION
LOCATION: BACK
LOCATION: BACK;LEG
LOCATION: BACK;LEG

## 2020-12-13 ASSESSMENT — PAIN SCALES - GENERAL
PAINLEVEL_OUTOF10: 10
PAINLEVEL_OUTOF10: 0
PAINLEVEL_OUTOF10: 8

## 2020-12-13 ASSESSMENT — PAIN DESCRIPTION - PROGRESSION
CLINICAL_PROGRESSION: NOT CHANGED

## 2020-12-13 ASSESSMENT — PAIN - FUNCTIONAL ASSESSMENT: PAIN_FUNCTIONAL_ASSESSMENT: PREVENTS OR INTERFERES SOME ACTIVE ACTIVITIES AND ADLS

## 2020-12-13 ASSESSMENT — PAIN DESCRIPTION - ORIENTATION: ORIENTATION: RIGHT

## 2020-12-13 NOTE — PROGRESS NOTES
[facility]       Progress Note           Subjective/   44y.o. year old male who we are seeing in consultation for ESRD. The patient was seen and examined; he was resting comfortably today with no acute events noted overnight. ROS: No fever or chills. Social: No family at bedside. Objective/   GEN:  Chronically ill, BP (!) 158/58   Pulse 86   Temp 97.9 °F (36.6 °C)   Resp 14   Ht 5' 9\" (1.753 m)   Wt 264 lb 12.4 oz (120.1 kg)   SpO2 92%   BMI 39.10 kg/m²      HEENT: non-icteric, no JVD  CV: S1, S2 without m/r/g; no LE edema  RESP: CTA B without w/r/r; breathing wnl  ABD: +bs, soft, nt, no hsm  SKIN: warm, no rashes  ACCESS: [facility]    Data/  Recent Labs     12/11/20  0449 12/12/20  0944   WBC 10.8 10.9   HGB 9.1* 9.4*   HCT 27.1* 28.6*   MCV 89.8 90.9    264     Recent Labs     12/10/20  2044 12/11/20  0449 12/12/20  0944   * 127* 128*   K 5.5* 4.9 4.8   CL 87* 89* 94*   CO2 28 28 24   GLUCOSE 213* 179* 154*   BUN 35* 39* 25*   CREATININE 5.8* 6.4* 4.5*   LABGLOM 10* 9* 14*   GFRAA 12* 11* 17*       Assessment/Plan     ESRD.  - On HD MWF at [facility]. - Vascular access: TDC.     Hyponatremia. - Likely from fluid in an ESRD patient. - UF with HD. Fluid restriction.     Hypertension.  - BP readings better after stopping Hydralazine.  - On Torsemide, Carvedilol, Diltiazem, Imdur and Losartan.     Anemia.  - Aranesp 25mcg qweekly with HD qWednesday.   - Hgb 9.4 g/dL.     Bilateral LE Weakness.  - Plans per Neurology, s/p LP on 12/11

## 2020-12-13 NOTE — PROGRESS NOTES
12/13/20 0456   NIV Type   Equipment Type V60   Mode CPAP   Mask Type Full face mask   Mask Size Medium   Settings/Measurements   CPAP/EPAP 10 cmH2O   Resp 11   FiO2  30 %   Vt Exhaled 503 mL   Minute Volume 4.1 Liters   Mask Leak (lpm) 47 lpm   Comfort Level Good   Using Accessory Muscles No

## 2020-12-13 NOTE — PROGRESS NOTES
Occupational Therapy  Facility/Department: [place] [address] [facility]  Daily Treatment Note  NAME: Grover Worley  : 1970  MRN: [id number]    Date of Service: 2020    Discharge Recommendations:  Subacute/Skilled Nursing Facility       Assessment   Assessment: continues to complain of decreased sensation Onofre LE & chronic bakc & LE pain at rest; requires mod assist of 2 with max verbal cues for safety with transfers, poor safety awareness/impulsive; continues to benefit from skilled OT services  OT Education: Precautions;OT Role  Patient Education: disease specific: importance of calling nursing for assist with transfers/standing to use urinal  due to decreased LE sensation, falls, weakness  Activity Tolerance  Activity Tolerance: Patient Tolerated treatment well  Activity Tolerance: vitals stable  Safety Devices  Safety Devices in place: Yes  Type of devices: Call light within reach; Chair alarm in place; Left in chair;Nurse notified         Patient Diagnosis(es): The primary encounter diagnosis was Bilateral leg weakness. Diagnoses of Numbness and tingling of both legs and ESRD (end stage renal disease) on dialysis [facility]) were also pertinent to this visit.       has a past medical history of Ambulatory dysfunction, Aortic stenosis, Arthritis, Asthma, Bilateral hilar adenopathy syndrome, CAD (coronary artery disease), Cardiomyopathy ([address] CHF (congestive heart failure) ([address] Chronic pain, COPD (chronic obstructive pulmonary disease) ([address] Depression, Diabetes mellitus ([address] Difficult intravenous access, Emphysema of lung ([address] ESRD (end stage renal disease) on dialysis ([address] Fear of needles, Gastric ulcer, GERD (gastroesophageal reflux disease), Heart valve problem, Hemodialysis patient ([address] History of spinal fracture, Hx of blood clots, Hyperlipidemia, Hypertension, MI (myocardial infarction) ([address] Neuromuscular disorder ([address] Numbness and tingling in left arm, Pneumonia, PONV (postoperative nausea and vomiting), Prolonged emergence from general anesthesia, Sleep apnea, Stroke ([address] TIA (transient ischemic attack), and Unspecified diseases of blood and blood-forming organs. has a past surgical history that includes Tonsillectomy; cyst removal (08/14/2013); Colonoscopy; Coronary angioplasty with stent (05/26/15); vascular surgery (aprx 2 years ago); Colonoscopy (2/29/2015); Upper gastrointestinal endoscopy (01/06/2016); Upper gastrointestinal endoscopy (01/29/2017); other surgical history (02/01/2017); Dialysis fistula creation (Left, 10/30/2017); Diagnostic Cardiac Cath Lab Procedure; other surgical history (2018); other surgical history (Bilateral, 06/26/2018); pr lap insertion tunneled intraperitoneal catheter (N/A, 9/21/2018); other surgical history (Right, 09/2018); Dialysis fistula creation (Left, 3/27/2019); other surgical history (05/28/2019); Endoscopy, colon, diagnostic; Catheter Removal (Right, 7/2/2019); and Aortic valve replacement (N/A, 10/15/2019). Restrictions  Restrictions/Precautions  Restrictions/Precautions: Fall Risk, Up as Tolerated, General Precautions  Required Braces or Orthoses?: No  Position Activity Restriction  Other position/activity restrictions: Up with assist, telemetry  Subjective   General  Chart Reviewed: Yes  Patient assessed for rehabilitation services?: Yes  Response to previous treatment: Patient with no complaints from previous session  Family / Caregiver Present: No  Subjective  Subjective: pt sitting EOB Left Leg crossed under RIght LE  Pain Assessment  Pain Assessment: 0-10  Pain Level: 10  Pain Type: Chronic pain  Pain Location: Back;Leg  Pain Orientation: Right;Left;Mid;Lower  Functional Pain Assessment: Prevents or interferes some active activities and ADLs  Non-Pharmaceutical Pain Intervention(s): Ambulation/Increased Activity; Therapeutic presence;Repositioned  Pre Treatment Pain Screening  Intervention List: Patient able to continue with treatment  Vital Signs  Patient Currently in Pain: Yes   Orientation  Orientation  Overall Orientation Status: Within Functional Limits  Objective    ADL  Feeding: Independent        Balance  Sitting Balance: Supervision  Standing Balance: Dependent/Total(max assist of 2 due c/o decreased sensation, tremors Onofre LE)  Standing Balance  Time: 1 minute x 2  Activity: sit<-->stand & stand pivot bed-->chair  Comment: mod assist to manage RW for safe sit<-->stand transfers & stand-pivot bed-->chair to Right  Bed mobility  Supine to Sit: Unable to assess(already sitting EOB)  Sit to Supine: Unable to assess(Left up in chair for breakfast, pt agreeable)  Scooting: Stand by assistance  Transfers  Stand Pivot Transfers: 2 Person assistance(mod assist of 2 to right with RW)  Sit to stand: 2 Person assistance(mod assist of 2 with RW)  Stand to sit: 2 Person assistance(mod assist of 2)  Transfer Comments: cues for safe hand placement & to manage RW to chair                       Cognition  Overall Cognitive Status: Exceptions  Arousal/Alertness: Appropriate responses to stimuli  Following Commands: Follows one step commands consistently  Attention Span: Attends with cues to redirect  Memory: Decreased recall of precautions  Safety Judgement: Decreased awareness of need for safety;Decreased awareness of need for assistance  Problem Solving: Assistance required to identify errors made;Assistance required to implement solutions;Decreased awareness of errors;Assistance required to correct errors made;Assistance required to generate solutions  Insights: Decreased awareness of deficits  Initiation: Requires cues for some  Sequencing: Requires cues for some  Cognition Comment: patient requires frequent cueing for safety. patient is extremely impulsive and minimizes the risks of attempting to stand without assistance.        Type of ROM/Therapeutic Exercise: AROM BUE  Comment: seated EOB  Hand flex/ext: x   15 Reps  Elbow flex/ext:  x  15 Reps  Forearm sup/pron:  x  15  Reps  Shld flex/ext:  x 15   Reps    LUE AROM : WFL  RUE AROM : WFL     Plan   Plan  Times per week: 3-5x  Current Treatment Recommendations: Strengthening, Balance Training, Functional Mobility Training, Endurance Training, Safety Education & Training, Neuromuscular Re-education, Self-Care / ADL    AM-PAC Score        AM-PAC Inpatient Daily Activity Raw Score: 16 (12/13/20 0944)  AM-PAC Inpatient ADL T-Scale Score : 35.96 (12/13/20 0944)  ADL Inpatient CMS 0-100% Score: 53.32 (12/13/20 0944)  ADL Inpatient CMS G-Code Modifier : CK (12/13/20 0944)    Goals  Short term goals  Time Frame for Short term goals: 7 days 12/15/20  Short term goal 1: Pt will complete toilet transfer with MIN A--12/13 mod assist of 2 with functional transfers  Short term goal 2: Pt will complete LB dressing with MIN A with use of AE as needed--  Short term goal 3: Pt will complete short house hold distacne with RW with MIN A-- 12/13 mod assist of 2 stand-pivot transfers with RW  Patient Goals   Patient goals :  Will complete ADLs with independent       Therapy Time   Individual Concurrent Group Co-treatment   Time In 0810         Time Out 0835         Minutes [address], [place]

## 2020-12-13 NOTE — PROGRESS NOTES
Physical Therapy  Facility/Department: [facility] TE[facility]  Daily Treatment Note  NAME: Sukumar De Leon  : 1970  MRN: [id number]    Date of Service: 2020    Discharge Recommendations:  Subacute/Skilled Nursing Facility   PT Equipment Recommendations  Equipment Needed: No  Other: Defer to facility  If pt is unable to be seen after this session, please let this note serve as discharge summary. Please see case management note for discharge disposition. Thank you. Barriers to home discharge:   [x] Other: Per chart patient has had 30+ falls in the past 3 months. Assessment   Body structures, Functions, Activity limitations: Decreased functional mobility ; Decreased strength;Decreased balance;Decreased sensation;Decreased endurance;Decreased ADL status; Decreased posture;Decreased cognition;Decreased safe awareness;Decreased high-level IADLs; Increased pain  Assessment: Patient is making slow progress with his therapy goals. Patient today was able to perform transfers with max assist x 2 and ambulate 3 feet from bed to chair with RW and max assist x 2. Patient was impulsive during the session and was educated again on the importance of chair alarms and calling for assistance before trying to stand on his own. Patient continues to present with the therapy deficits listed above. PT again recommends that this patient receive skilled PT in the SNF setting, when medically stable, to address these deficits and to help him maximize his safety and independence with all functional mobility. PT to continue to follow. Treatment Diagnosis: Impaired sensation and mobility, weakness  Prognosis: Fair  Decision Making: Medium Complexity  PT Education: Goals;PT Role;Plan of Care;General Safety;Transfer Training;Gait Training;Functional Mobility Training;Disease Specific Education; Injury Prevention  Patient Education: Patient educated on importance of fall risk precautions (calling for assistance, importance of patient is extremely impulsive and minimizes the risks of attempting to stand without assistance. Objective   Bed mobility  Supine to Sit: Supervision  Sit to Supine: Unable to assess(patient in chair at end of therapy session.)  Transfers  Sit to Stand: Maximum Assistance;2 Person Assistance  Stand to sit: Maximum Assistance;2 Person Assistance  Bed to Chair: Maximum assistance;2 Person Assistance  Comment: with RW  Ambulation  Ambulation?: Yes  More Ambulation?: No  Ambulation 1  Surface: level tile  Device: Rolling Walker  Assistance: Maximum assistance;2 Person assistance  Quality of Gait: ataxic gait pattern. wide base of support. cueing to correct. max assist to help turn rolling walker. Gait Deviations: Slow Ольга; Increased ALANNAH; Decreased step length;Decreased step height  Distance: bed to chair ~3 feet. Comments: No complaints of SOB, chest pain. Mild lightheadedness that improved once he sat down. Balance  Posture: Fair  Sitting - Static: Good  Sitting - Dynamic: Good  Standing - Static: Poor  Standing - Dynamic: Poor  Comments: with RW  Exercises  Hip Flexion: 1 x 15  Hip Abduction: 1 x 10 (with pillow between knees)  Knee Long Arc Quad: 1 x 10  Ankle Pumps: 2 x 10  Upper Extremity: see OT note  Comments: cueing for sequencing and technique  Other exercises  Other exercises?: No                        AM-PAC Score     AM-PAC Inpatient Mobility without Stair Climbing Raw Score : 12 (12/13/20 0933)  AM-PAC Inpatient without Stair Climbing T-Scale Score : 37.26 (12/13/20 0933)  Mobility Inpatient CMS 0-100% Score: 63.03 (12/13/20 0933)  Mobility Inpatient without Stair CMS G-Code Modifier : CL (12/13/20 0933)       Goals  Short term goals  Time Frame for Short term goals: 1 week, 12/15/20 unless otherwise noted.   Short term goal 1: Pt will perform bed mobility with independence; 12/13 Supervision  Short term goal 2: Pt will perform sit<>stand transfer with min A; 12/9 HERIBERTO plus, 12/13 max assist x 2 with RW  Short term goal 3: Pt will perform bed<>chair transfer with LRAD and min A; 12/9 HERIBERTO plus for all mobility, 12/13 max assist x 2 with RW  Short term goal 4: Pt will ambulate 20ft with LRAD and min A; 12/9 HERIBERTO plus with A of 2, 12/13 3 feet max assist x 2 with RW  Short term goal 5: By 12/11 pt will tolerate x 10-15 reps BLE exercise to assist with functional transfers and ambulation. ; 12/13 progressing  Patient Goals   Patient goals : \"To feel my legs again. \"    Plan    Plan  Times per week: 3-5x/wk  Times per day: Daily  Current Treatment Recommendations: Strengthening, Balance Training, Functional Mobility Training, Gait Training, Endurance Training, Safety Education & Training, Patient/Caregiver Education & Training  Safety Devices  Type of devices:  All fall risk precautions in place, Patient at risk for falls, Nurse notified, Call light within reach, Left in chair, Gait belt  Restraints  Initially in place: No     Therapy Time   Individual Concurrent Group Co-treatment   Time In 0809         Time Out 0835         Minutes 26         Timed Code Treatment Minutes: [address], PT

## 2020-12-13 NOTE — PROGRESS NOTES
12/13/20 0032   NIV Type   $NIV $Daily Charge   Skin Assessment Clean, dry, & intact   NIV Started/Stopped On   Equipment Type v60   Mode CPAP   Mask Type Full face mask   Mask Size Medium   Settings/Measurements   CPAP/EPAP 10 cmH2O   Resp 10   FiO2  30 %   Vt Exhaled 403 mL   Minute Volume 4.3 Liters   Mask Leak (lpm) 10 lpm   Comfort Level Good   Using Accessory Muscles No

## 2020-12-13 NOTE — CONSULTS
[facility]  [phone]      Attending Physician: Viktor Gracia MD  Reason for Consultation/Chief Complaint: sinus pause    Subjective   History of Present Illness:  Michael Disla is a 44 y.o. patient who presented to the hospital with complaints of BLe leg weakness and falls. Seen by neuro and felt to be due to Dm neuropathy. Pt in house yesterday non telel noted to have mulltiple nae episodes with pauses up to 4.04sec. all appears to be sinus arrest with narrow escape QRS. Occurred while pt was on bipap asleep. He does states that no overt LOC but has had episodes of \"spacing out\" where wife is talking to him ot yelling at him\" and then he remembers her yelling at him. States some atypical CP and no SOB. Past Medical History:   has a past medical history of Ambulatory dysfunction, Aortic stenosis, Arthritis, Asthma, Bilateral hilar adenopathy syndrome, CAD (coronary artery disease), Cardiomyopathy ([address] CHF (congestive heart failure) ([address] Chronic pain, COPD (chronic obstructive pulmonary disease) ([address] Depression, Diabetes mellitus ([address] Difficult intravenous access, Emphysema of lung ([address] ESRD (end stage renal disease) on dialysis ([address] Fear of needles, Gastric ulcer, GERD (gastroesophageal reflux disease), Heart valve problem, Hemodialysis patient ([address] History of spinal fracture, Hx of blood clots, Hyperlipidemia, Hypertension, MI (myocardial infarction) ([address] Neuromuscular disorder ([address] Numbness and tingling in left arm, Pneumonia, PONV (postoperative nausea and vomiting), Prolonged emergence from general anesthesia, Sleep apnea, Stroke ([address] TIA (transient ischemic attack), and Unspecified diseases of blood and blood-forming organs. Surgical History:   has a past surgical history that includes Tonsillectomy; cyst removal (08/14/2013); Colonoscopy; Coronary angioplasty with stent (05/26/15); vascular surgery (aprx 2 years ago);  Colonoscopy (2/29/2015); Upper gastrointestinal endoscopy (01/06/2016); Upper gastrointestinal endoscopy (01/29/2017); other surgical history (02/01/2017); Dialysis fistula creation (Left, 10/30/2017); Diagnostic Cardiac Cath Lab Procedure; other surgical history (2018); other surgical history (Bilateral, 06/26/2018); pr lap insertion tunneled intraperitoneal catheter (N/A, 9/21/2018); other surgical history (Right, 09/2018); Dialysis fistula creation (Left, 3/27/2019); other surgical history (05/28/2019); Endoscopy, colon, diagnostic; Catheter Removal (Right, 7/2/2019); and Aortic valve replacement (N/A, 10/15/2019). Social History:   reports that he has been smoking cigarettes. He has a 33.00 pack-year smoking history. He has never used smokeless tobacco. He reports previous alcohol use. He reports that he does not use drugs. Family History:  family history includes Alcohol Abuse in his brother; Cancer in his sister and sister; Diabetes in his mother; Heart Disease in his father, sister, and sister; Kidney Disease in his sister; Obesity in his sister and sister. Home Medications:  Were reviewed and are listed in nursing record and/or below  Prior to Admission medications    Medication Sig Start Date End Date Taking? Authorizing Provider   Insulin Pen Needle 31G X 5 MM MISC 1 each by Does not apply route daily 12/1/20   Denzel Gao MD   oxyCODONE-acetaminophen (PERCOCET) 7.5-325 MG per tablet Take 1 tablet by mouth every 4 hours as needed for Pain.     Historical Provider, MD   hydrALAZINE (APRESOLINE) 50 MG tablet TAKE 1/2 TABLET BY MOUTH EVERY 8 HOURS 11/24/20   Denzel Gao MD   B Complex-C-Folic Acid (VIRT-CAPS) 1 MG CAPS TK ONE C PO  QD 11/18/20   Denzel Gao MD   Continuous Blood Gluc Sensor (FREESTYLE STEPHANY 14 DAY SENSOR) MISC 1 each by Does not apply route every 14 days 11/10/20   Denzel Gao MD   insulin glargine [facility]) 100 UNIT/ML injection pen Inject 70 Units into the skin nightly 11/10/20   Migdalia Villar MD   dilTIAZem (CARDIZEM CD) 180 MG extended release capsule TAKE 1 CAPSULE BY MOUTH DAILY 11/4/20   Migdalia Villar MD   traZODone (DESYREL) 150 MG tablet TAKE (1) TABLET BY MOUTH NIGHTLY 11/4/20   Migdalia Villar MD   citalopram (CELEXA) 40 MG tablet TAKE (1) TABLET BY MOUTH DAILY 11/4/20   Migdalia Villar MD   carvedilol (COREG) 12.5 MG tablet TAKE (1) TABLET BY MOUTH TWICE DAILY WITH MEALS 11/4/20   Migdalia Villar MD   hydrOXYzine (VISTARIL) 50 MG capsule TAKE 1 TO 2 CAPSULES BY MOUTH NIGHTLY 10/12/20   Migdalia Villar MD   insulin aspart (NOVOLOG FLEXPEN) 100 UNIT/ML injection pen Inject 20 Units into the skin 3 times daily (before meals) 10/12/20   Migdalia Villar MD   clopidogrel (PLAVIX) 75 MG tablet TAKE 1 TABLET BY MOUTH ONCE DAILY 10/2/20   Vaughn West MD   pravastatin (PRAVACHOL) 80 MG tablet TAKE (1) TABLET BY MOUTH ONCE DAILY 9/29/20   Vaughn West MD   QUEtiapine (SEROQUEL) 50 MG tablet TAKE 1 TABLET BY MOUTH EVERY EVENING 9/28/20   Migdalia Villar MD   isosorbide mononitrate (IMDUR) 30 MG extended release tablet TAKE 1 TABLET BY MOUTH EVERY DAY 9/1/20   Migdalia Villar MD   torsemide (DEMADEX) 100 MG tablet Take 1-2 tablets by mouth daily 8/5/20   Migdalia Villar MD   ondansetron (ZOFRAN ODT) 4 MG disintegrating tablet Take 1 tablet by mouth every 8 hours as needed for Nausea 8/5/20   Migdalia Villar MD   pregabalin (LYRICA) 25 MG capsule Take 1 capsule by mouth 3 times daily for 30 days.  8/5/20 11/10/20  Migdalia Villar MD   LINZESS 145 MCG capsule TAKE 1 CAPSULE BY MOUTH EVERY MORNING BEFORE BREAKFAST 7/6/20   Migdalia Villar MD   Calcium Acetate, Phos Binder, 667 MG CAPS TAKE 1 CAPSULE BY MOUTH THREE TIMES DAILY WITH MEALS 6/29/20   Migdalia Villar MD   tiZANidine (ZANAFLEX) 4 MG tablet TAKE 1 TABLET BY MOUTH THREE TIMES DAILY 6/4/20   Migdalia Villar MD   gabapentin (NEURONTIN) 100 MG capsule TAKE 1 TO 2 CAPSULES BY MOUTH THREE TIMES A DAY  Patient not taking: Reported on 11/10/2020 5/27/20 11/10/20  Curt Acevedo MD   fluconazole (DIFLUCAN) 150 MG tablet TAKE 1 TABLET BY MOUTH 1 TIME FOR 1 DOSE 5/14/20   Curt Acevedo MD   DULoxetine (CYMBALTA) 30 MG extended release capsule TAKE 1 CAPSULE BY MOUTH EVERY DAY 4/3/20   JAY Zhu - CNP   nystatin-triamcinolone (MYCOLOG II) 483975-1.2 UNIT/GM-% cream Apply topically 2 times daily. 3/16/20   JAY London - CNP   losartan (COZAAR) 50 MG tablet TAKE (1) TABLET BY MOUTH DAILY 3/10/20   Curt Acevedo MD   pantoprazole (PROTONIX) 40 MG tablet TAKE (1) TABLET BY MOUTH EACH MORNING BEFORE BREAKFAST 3/10/20   Curt Acevedo MD   albuterol (PROVENTIL) (2.5 MG/3ML) 0.083% nebulizer solution INHALE 1 VIAL VIA NEBULIZER EVERY 6 HOURS AS NEEDED FOR WHEEZING 3/10/20   Curt Acevedo MD   nystatin (MYCOSTATIN) 352247 UNIT/GM cream Apply topically 2 times daily. 3/4/20   Curt Acevedo MD   Insulin Syringe-Needle U-100 30G X 1/2\" 0.5 ML MISC 1 each by Does not apply route daily 3/4/20   Curt Acevedo MD   nitroGLYCERIN (NITROSTAT) 0.4 MG SL tablet DISSOLVE 1 TABLET UNDER THE TONGUE AS NEEDED FOR CHEST PAIN EVERY 5 MINUTES UP TO 3 TIMES. IF NO RELIEF CALL 911. 2/7/20   Curt Acevedo MD   blood glucose test strips (FREESTYLE LITE) strip Daily As needed.   Patient not taking: Reported on 11/10/2020 8/19/19   Curt Acevedo MD   glucose monitoring kit (FREESTYLE) monitoring kit 1 kit by Does not apply route daily  Patient not taking: Reported on 11/10/2020 8/19/19   Curt Acevedo MD   vitamin D (ERGOCALCIFEROL) 27884 units CAPS capsule TK 1 C PO WEEKLY 6/2/19   Historical Provider, MD   flunisolide (NASALIDE) 25 MCG/ACT (0.025%) SOLN Inhale 2 sprays into the lungs every 12 hours 5/22/19   Maged Alford MD   Tiotropium Bromide-Olodaterol (STIOLTO RESPIMAT) 2.5-2.5 MCG/ACT AERS Inhale 2 puffs into the lungs daily 5/21/19   Maged Alford MD   Polyethylene Glycol 3350 GRAN  5/2/18   Historical Provider, MD   Glucose Blood (BLOOD Daily      DULoxetine (CYMBALTA) extended release capsule 30 mg, Daily      gabapentin (NEURONTIN) capsule 100 mg, TID      insulin glargine (LANTUS) injection vial 70 Units, Nightly      isosorbide mononitrate (IMDUR) extended release tablet 30 mg, Daily      linaclotide (LINZESS) capsule 145 mcg, QAM AC      losartan (COZAAR) tablet 50 mg, Daily      pantoprazole (PROTONIX) tablet 40 mg, QAM AC      pravastatin (PRAVACHOL) tablet 80 mg, Nightly      pregabalin (LYRICA) capsule 25 mg, TID      QUEtiapine (SEROQUEL) tablet 50 mg, QPM      glycopyrrolate-formoterol (BEVESPI) 9-4.8 MCG/ACT inhaler 2 puff, BID      tiZANidine (ZANAFLEX) tablet 2 mg, TID      torsemide (DEMADEX) tablet 100 mg, Daily      traZODone (DESYREL) tablet 150 mg, Nightly      sodium chloride flush 0.9 % injection 10 mL, 2 times per day      sodium chloride flush 0.9 % injection 10 mL, PRN      acetaminophen (TYLENOL) tablet 650 mg, Q6H PRN    Or      acetaminophen (TYLENOL) suppository 650 mg, Q6H PRN      polyethylene glycol (GLYCOLAX) packet 17 g, Daily PRN      promethazine (PHENERGAN) tablet 12.5 mg, Q6H PRN    Or      ondansetron (ZOFRAN) injection 4 mg, Q6H PRN      heparin (porcine) injection 5,000 Units, 3 times per day        Allergies:  Morphine     Review of Systems:   A 14 point review of symptoms completed. Pertinent positives identified in the HPI, all other review of symptoms negative as below.       Objective   PHYSICAL EXAM:    Vitals:    12/13/20 0524   BP: (!) 166/70   Pulse: 79   Resp: 16   Temp: 97.5 °F (36.4 °C)   SpO2: 93%    Weight: 264 lb 12.4 oz (120.1 kg)         General Appearance:  Alert, cooperative, no distress, appears stated age, overweight   Head:  Normocephalic, without obvious abnormality, atraumatic   Eyes:  PERRL, conjunctiva/corneas clear   Nose: Nares normal, no drainage or sinus tenderness   Throat: Lips, mucosa, and tongue normal   Neck: Supple, symmetrical, trachea midline, no adenopathy, thyroid: not enlarged, symmetric, no tenderness/mass/nodules, no carotid bruit or JVD   Lungs:   Clear to auscultation bilaterally, respirations unlabored   Chest Wall:  No deformity or tenderness   Heart:  Regular rate and rhythm, S1, S2 normal, no murmur, rub or gallop   Abdomen:   Soft, non-tender, bowel sounds active all four quadrants,  no masses, no organomegaly   Extremities: Extremities normal, atraumatic, no cyanosis or edema   Pulses: 2+ and symmetric   Skin: Skin color, texture, turgor normal, no rashes or lesions   Pysch: Normal mood and affect   Neurologic: Normal gross motor and sensory exam.         Labs   CBC:   Lab Results   Component Value Date    WBC 10.9 2020    RBC 3.15 2020    HGB 9.4 2020    HCT 28.6 2020    MCV 90.9 2020    RDW 13.7 2020     2020     CMP:  Lab Results   Component Value Date     2020    K 4.8 2020    CL 94 2020    CO2 24 2020    BUN 25 2020    CREATININE 4.5 2020    GFRAA 17 2020    GFRAA >60 2013    AGRATIO 1.1 2020    LABGLOM 14 2020    GLUCOSE 154 2020    PROT 7.1 2020    PROT 6.7 2012    CALCIUM 9.0 2020    BILITOT <0.2 2020    ALKPHOS 96 2020    AST 35 2020    ALT 18 2020     PT/INR:  No results found for: PTINR  HgBA1c:  Lab Results   Component Value Date    LABA1C 10.9 2020     Lab Results   Component Value Date    CKTOTAL 167 2020    TROPONINI 0.04 (H) 2020         Cardiac Data     Last EK/10/2020 NSR at 60, 1st AVB, septal infarct, possible inferior infarct    Echo: 2020   Low Normal systolic function with an estimated ejection fraction of 50%. Left ventricular function and wall motion is difficult to estimate due to   poor endocardial visualization. Grade I diastolic dysfunction with normal filing pressure.    Normally functioning TAVR 29 mm Langford Leyda S3 bioprosthetic valve in   aortic position appears well seated with a max velocity of 2.11 m/sec,   maximum gradient of 18 mmHg and a mean gradient of 8 mmHg. There is no evidence of aortic regurgitation. Stress Test:  Stress Echocardiogram 9/17/18  Baseline nonspecific ST changes.   PVC's during infusion.   Patient developed bilateral jaw tightness 4/10, likely related to   Dobutamine. Symptoms resolved with rest.   No EKG changes of stress induced left ventricular ischemia.   Dobutamine stress echo shows normal baseline EF at 55%. No regional wall   motion abnormality at rest or post dobutamine infusion.   Normal hyperdynamic response to dobutamine stress. Definity contrast is   used.   Normal dobutamine stress echocardiogram.        Stress test 4/19/19  Summary  Abnormal low to moderate risk myocardial perfusion study. Ave Going is a mild inferior and apical defect consistent with attenuation  artifact versus prior subendocardial infarction. Ave Going is no evidence for ischemia.  The estimated left ventricular function is 43%.  The left ventricular size is mildly dilated. Cath:  Cath 8/2019  Procedures: Cor angio, sedation     LM <20%  LAD patent stent  Cx <20%  RCA 30%     Severe AS by echo  Proceed w/ TAVR    Studies:     Assessment and Plan      1. Bradycardia, pause  2. Hx of Syncope and Heart block:   - resolved off gladys agents. 3. Bicuspid AV/AS   10/2019 s/p TAVR  4. CAD: Nonobstructive on 2019 cath  5. Nonischemic cardiomyopathy: LVEF 50%  6. PVD  7. HTN  8. Acute BLE wekness and frequent falls: neuro following. 9. DM with neuropathy  10 . ESRD  11 Chronic hyponatremia  12Normocytic anemia      PLAN  1. Several sinus pauses, longest 4.04sec  2. Hold Dilt and coreg at this time  3. tx BP as needed  4.compensated but likely fluid overloaded, diuresis per Nephrology  5.  EP to See in AM. Previously seen by Dr. Beth Oropeza   - was prelim discussions regarding pacer   - will need BB for CAD and CHF      Patient Active Problem List   Diagnosis    Acute respiratory failure with hypoxia and hypercapnia (HCC)    Chronic dCHF (grade 2 LVDD)    Chronic obstructive pulmonary disease ([address]    CAD (coronary artery disease)    PVD (peripheral vascular disease) [facility]    Cardiomyopathy ([address]    Sleep apnea    Bicuspid aortic valve    Bilateral hilar adenopathy syndrome    Claudication in peripheral vascular disease ([address]    Essential hypertension    Diabetic neuropathy (HCC)    Type 2 diabetes, uncontrolled, with neuropathy ([address]    Passive smoke exposure    Depression with anxiety    Hematoma    Pneumonia of right upper lobe due to infectious organism    DM (diabetes mellitus), secondary, uncontrolled, w/neurologic complic ([address]    Hypertensive heart disease with congestive heart failure with preserved left ventricular function ([address]    CHF exacerbation ([facility]    Chest pain    Coronary artery disease involving native coronary artery of native heart without angina pectoris    Obesity (BMI 30-39. 9)    ZAINAB on CPAP    Degeneration of lumbar or lumbosacral intervertebral disc    Lumbar radiculopathy    Lumbosacral spondylosis without myelopathy    Biliary colic    Symptomatic cholelithiasis    Gastroparesis due to DM (HCC)    Angina, class IV ([facility])    Dyspnea    Elevated brain natriuretic peptide (BNP) level    Dyslipidemia    Respiratory distress    Hypoxia    Chest pressure    Hypertensive urgency    Acute on chronic combined systolic and diastolic heart failure (HCC)    Ischemic cardiomyopathy    Chronic renal failure, stage 5 (HCC)    Tobacco abuse    CKD stage 4 due to type 2 diabetes mellitus (HCC)    CVA (cerebral vascular accident) ([address]    Arterial ischemic stroke, ICA, right, acute ([facility]    Type 2 diabetes mellitus without complication, without long-term current use of insulin ([address]    HTN (hypertension), benign    ZAINAB (obstructive sleep apnea)    Diarrhea    Pleural effusion    Chronic anemia    Nonrheumatic aortic valve stenosis    Hypervolemia    Hyperkalemia    Mucus plugging of bronchi    Hemodialysis-associated hypotension    Left arm pain    Dizziness    ESRD (end stage renal disease) on dialysis (HCC)    Hypotension due to drugs    Acute diastolic CHF (congestive heart failure) (HCC)    Neuromuscular disorder (HCC)    Acute combined systolic and diastolic CHF, NYHA class 4 (HCC)    Renovascular hypertension    Mixed hyperlipidemia    Cigarette nicotine dependence in remission    Acute respiratory failure (HCC)    Pulmonary edema    Fluid overload    Diastolic dysfunction    Anemia of chronic disease    SOB (shortness of breath)    Steal syndrome of dialysis vascular access (HCC)    Chronic, continuous use of opioids    Chronic bronchitis (HCC)    Nasal congestion    Hypercholesteremia    Bradycardia    S/p TAVR (transcatheter aortic valve replacement), bioprosthetic    Syncope and collapse    Atrial fibrillation ([facility]    Former smoker    Generalized weakness    DKA, type 1, not at goal [facility])    Bilateral leg weakness    GBS (Guillain-Somerset syndrome) ([facility]           Thank you for allowing us to participate in the care of [place]. Please call me with any questions [phone]. Steve Bartlett MD, [address] [facility] Cardiologist  [facility], [place]  [phone] [facility]  [phone] [address] [place] [address]  12/13/2020 7:44 AM    I will address the patient's cardiac risk factors and adjusted pharmacologic treatment as needed. In addition, I have reinforced the need for patient directed risk factor modification. All questions and concerns were addressed to the patient/family. Alternatives to my treatment were discussed. The note was completed using EMR. Every effort was made to ensure accuracy; however, inadvertent computerized transcription errors may be present.

## 2020-12-14 LAB
ANION GAP SERPL CALCULATED.3IONS-SCNC: 9 MMOL/L (ref 3–16)
BASOPHILS ABSOLUTE: 0.1 K/UL (ref 0–0.2)
BASOPHILS RELATIVE PERCENT: 0.6 %
BUN BLDV-MCNC: 43 MG/DL (ref 7–20)
CALCIUM SERPL-MCNC: 9.1 MG/DL (ref 8.3–10.6)
CHLORIDE BLD-SCNC: 91 MMOL/L (ref 99–110)
CO2: 27 MMOL/L (ref 21–32)
CREAT SERPL-MCNC: 5.9 MG/DL (ref 0.9–1.3)
EOSINOPHILS ABSOLUTE: 0.5 K/UL (ref 0–0.6)
EOSINOPHILS RELATIVE PERCENT: 4.7 %
GFR AFRICAN AMERICAN: 12
GFR NON-AFRICAN AMERICAN: 10
GLUCOSE BLD-MCNC: 139 MG/DL (ref 70–99)
GLUCOSE BLD-MCNC: 156 MG/DL (ref 70–99)
GLUCOSE BLD-MCNC: 198 MG/DL (ref 70–99)
GLUCOSE BLD-MCNC: 88 MG/DL (ref 70–99)
HCT VFR BLD CALC: 30.8 % (ref 40.5–52.5)
HEMOGLOBIN: 10.3 G/DL (ref 13.5–17.5)
LYMPHOCYTES ABSOLUTE: 1.3 K/UL (ref 1–5.1)
LYMPHOCYTES RELATIVE PERCENT: 11.6 %
MCH RBC QN AUTO: 29.6 PG (ref 26–34)
MCHC RBC AUTO-ENTMCNC: 33.4 G/DL (ref 31–36)
MCV RBC AUTO: 88.8 FL (ref 80–100)
MONOCYTES ABSOLUTE: 0.9 K/UL (ref 0–1.3)
MONOCYTES RELATIVE PERCENT: 8.3 %
NEUTROPHILS ABSOLUTE: 8.3 K/UL (ref 1.7–7.7)
NEUTROPHILS RELATIVE PERCENT: 74.8 %
PDW BLD-RTO: 13.4 % (ref 12.4–15.4)
PERFORMED ON: ABNORMAL
PLATELET # BLD: 314 K/UL (ref 135–450)
PMV BLD AUTO: 7.2 FL (ref 5–10.5)
POTASSIUM REFLEX MAGNESIUM: 4.5 MMOL/L (ref 3.5–5.1)
RBC # BLD: 3.47 M/UL (ref 4.2–5.9)
SODIUM BLD-SCNC: 127 MMOL/L (ref 136–145)
WBC # BLD: 11.1 K/UL (ref 4–11)

## 2020-12-14 PROCEDURE — 80048 BASIC METABOLIC PNL TOTAL CA: CPT

## 2020-12-14 PROCEDURE — 6360000002 HC RX W HCPCS: Performed by: INTERNAL MEDICINE

## 2020-12-14 PROCEDURE — 6370000000 HC RX 637 (ALT 250 FOR IP): Performed by: NURSE PRACTITIONER

## 2020-12-14 PROCEDURE — 94640 AIRWAY INHALATION TREATMENT: CPT

## 2020-12-14 PROCEDURE — 6370000000 HC RX 637 (ALT 250 FOR IP): Performed by: INTERNAL MEDICINE

## 2020-12-14 PROCEDURE — 1200000000 HC SEMI PRIVATE

## 2020-12-14 PROCEDURE — 36415 COLL VENOUS BLD VENIPUNCTURE: CPT

## 2020-12-14 PROCEDURE — 85025 COMPLETE CBC W/AUTO DIFF WBC: CPT

## 2020-12-14 PROCEDURE — 99223 1ST HOSP IP/OBS HIGH 75: CPT | Performed by: INTERNAL MEDICINE

## 2020-12-14 PROCEDURE — 90935 HEMODIALYSIS ONE EVALUATION: CPT

## 2020-12-14 PROCEDURE — 2700000000 HC OXYGEN THERAPY PER DAY

## 2020-12-14 PROCEDURE — 94660 CPAP INITIATION&MGMT: CPT

## 2020-12-14 PROCEDURE — 2580000003 HC RX 258: Performed by: INTERNAL MEDICINE

## 2020-12-14 RX ORDER — PANTOPRAZOLE SODIUM 40 MG/1
40 TABLET, DELAYED RELEASE ORAL
Status: DISCONTINUED | OUTPATIENT
Start: 2020-12-14 | End: 2020-12-21 | Stop reason: HOSPADM

## 2020-12-14 RX ORDER — CARVEDILOL 6.25 MG/1
6.25 TABLET ORAL 2 TIMES DAILY WITH MEALS
Status: DISCONTINUED | OUTPATIENT
Start: 2020-12-14 | End: 2020-12-21 | Stop reason: HOSPADM

## 2020-12-14 RX ADMIN — TORSEMIDE 100 MG: 100 TABLET ORAL at 11:42

## 2020-12-14 RX ADMIN — PANTOPRAZOLE SODIUM 40 MG: 40 TABLET, DELAYED RELEASE ORAL at 06:57

## 2020-12-14 RX ADMIN — SODIUM CHLORIDE, PRESERVATIVE FREE 10 ML: 5 INJECTION INTRAVENOUS at 11:42

## 2020-12-14 RX ADMIN — ANTACID TABLETS 500 MG: 500 TABLET, CHEWABLE ORAL at 10:03

## 2020-12-14 RX ADMIN — OXYCODONE HYDROCHLORIDE AND ACETAMINOPHEN 1 TABLET: 10; 325 TABLET ORAL at 19:54

## 2020-12-14 RX ADMIN — HEPARIN SODIUM 5000 UNITS: 5000 INJECTION INTRAVENOUS; SUBCUTANEOUS at 14:38

## 2020-12-14 RX ADMIN — INSULIN LISPRO 0 UNITS: 100 INJECTION, SOLUTION INTRAVENOUS; SUBCUTANEOUS at 21:21

## 2020-12-14 RX ADMIN — PANTOPRAZOLE SODIUM 40 MG: 40 TABLET, DELAYED RELEASE ORAL at 18:14

## 2020-12-14 RX ADMIN — PRAVASTATIN SODIUM 80 MG: 80 TABLET ORAL at 21:21

## 2020-12-14 RX ADMIN — INSULIN LISPRO 2 UNITS: 100 INJECTION, SOLUTION INTRAVENOUS; SUBCUTANEOUS at 18:14

## 2020-12-14 RX ADMIN — INSULIN GLARGINE 70 UNITS: 100 INJECTION, SOLUTION SUBCUTANEOUS at 21:41

## 2020-12-14 RX ADMIN — TIZANIDINE 2 MG: 4 TABLET ORAL at 21:21

## 2020-12-14 RX ADMIN — GLYCOPYRROLATE AND FORMOTEROL FUMARATE 2 PUFF: 9; 4.8 AEROSOL, METERED RESPIRATORY (INHALATION) at 19:43

## 2020-12-14 RX ADMIN — GABAPENTIN 100 MG: 100 CAPSULE ORAL at 11:42

## 2020-12-14 RX ADMIN — INSULIN LISPRO 2 UNITS: 100 INJECTION, SOLUTION INTRAVENOUS; SUBCUTANEOUS at 14:38

## 2020-12-14 RX ADMIN — HEPARIN SODIUM 5000 UNITS: 5000 INJECTION INTRAVENOUS; SUBCUTANEOUS at 06:57

## 2020-12-14 RX ADMIN — TIZANIDINE 2 MG: 4 TABLET ORAL at 11:42

## 2020-12-14 RX ADMIN — SODIUM CHLORIDE, PRESERVATIVE FREE 10 ML: 5 INJECTION INTRAVENOUS at 21:28

## 2020-12-14 RX ADMIN — PREGABALIN 25 MG: 25 CAPSULE ORAL at 11:43

## 2020-12-14 RX ADMIN — CARVEDILOL 6.25 MG: 6.25 TABLET, FILM COATED ORAL at 21:24

## 2020-12-14 RX ADMIN — ONDANSETRON 4 MG: 2 INJECTION INTRAMUSCULAR; INTRAVENOUS at 19:54

## 2020-12-14 RX ADMIN — OXYCODONE HYDROCHLORIDE AND ACETAMINOPHEN 1 TABLET: 10; 325 TABLET ORAL at 13:05

## 2020-12-14 RX ADMIN — ISOSORBIDE MONONITRATE 30 MG: 30 TABLET, EXTENDED RELEASE ORAL at 11:43

## 2020-12-14 RX ADMIN — OXYCODONE HYDROCHLORIDE AND ACETAMINOPHEN 1 TABLET: 10; 325 TABLET ORAL at 07:04

## 2020-12-14 RX ADMIN — GABAPENTIN 100 MG: 100 CAPSULE ORAL at 21:21

## 2020-12-14 RX ADMIN — QUETIAPINE FUMARATE 50 MG: 25 TABLET ORAL at 18:14

## 2020-12-14 RX ADMIN — DULOXETINE HYDROCHLORIDE 30 MG: 30 CAPSULE, DELAYED RELEASE ORAL at 11:42

## 2020-12-14 RX ADMIN — LOSARTAN POTASSIUM 50 MG: 25 TABLET, FILM COATED ORAL at 11:43

## 2020-12-14 RX ADMIN — HEPARIN SODIUM 5000 UNITS: 5000 INJECTION INTRAVENOUS; SUBCUTANEOUS at 21:21

## 2020-12-14 RX ADMIN — ONDANSETRON 4 MG: 2 INJECTION INTRAMUSCULAR; INTRAVENOUS at 07:04

## 2020-12-14 RX ADMIN — TRAZODONE HYDROCHLORIDE 150 MG: 50 TABLET ORAL at 21:21

## 2020-12-14 RX ADMIN — PREGABALIN 25 MG: 25 CAPSULE ORAL at 21:21

## 2020-12-14 RX ADMIN — ONDANSETRON 4 MG: 2 INJECTION INTRAMUSCULAR; INTRAVENOUS at 13:08

## 2020-12-14 ASSESSMENT — PAIN SCALES - GENERAL
PAINLEVEL_OUTOF10: 9
PAINLEVEL_OUTOF10: 0

## 2020-12-14 NOTE — PROGRESS NOTES
Hospitalist Progress Note      PCP: Claudetta Benes, MD    Date of Admission: 12/4/2020    Chief Complaint: Bilateral lower extremity weakness, numbness, and inability to walk for 2 days. Hospital Course: 44 y.o. male who presented to [facility] with above complaint. He has a history of diabetes and neuropathy. He does have lower back pain for long time. He has been feeling more numbness for last 2 days and today he could not get up and walk. He felt like he does not have leg. He fell couple of times and there is no apparent injuries. Currently he does not feel below the mid calf bilateral lower extremities. And he is able to move some extent lower extremities but unable to walk. Subjective:  OOB to chair. Apparently had some notable pauses on tele overnight. Cardiology consulted.       Medications:  Reviewed    Infusion Medications   Scheduled Medications    darbepoetin siddharth-polysorbate  25 mcg Intravenous Q7 Days    lidocaine  1 patch Transdermal Daily    insulin lispro  0-12 Units Subcutaneous TID WC    insulin lispro  0-6 Units Subcutaneous Nightly    aspirin  81 mg Oral Daily    carvedilol  12.5 mg Oral BID WC    clopidogrel  75 mg Oral Daily    dilTIAZem  180 mg Oral Daily    DULoxetine  30 mg Oral Daily    gabapentin  100 mg Oral TID    insulin glargine  70 Units Subcutaneous Nightly    isosorbide mononitrate  30 mg Oral Daily    linaclotide  145 mcg Oral QAM AC    losartan  50 mg Oral Daily    pantoprazole  40 mg Oral QAM AC    pravastatin  80 mg Oral Nightly    pregabalin  25 mg Oral TID    QUEtiapine  50 mg Oral QPM    glycopyrrolate-formoterol  2 puff Inhalation BID    tiZANidine  2 mg Oral TID    torsemide  100 mg Oral Daily    traZODone  150 mg Oral Nightly    sodium chloride flush  10 mL Intravenous 2 times per day    heparin (porcine)  5,000 Units Subcutaneous 3 times per day     PRN Meds: prochlorperazine, oxyCODONE-acetaminophen, calcium carbonate, results for input(s): Dewayne Perea in the last 72 hours. Urinalysis:      Lab Results   Component Value Date    NITRU Negative 06/05/2020    WBCUA 10-20 06/05/2020    BACTERIA 2+ 06/05/2020    RBCUA 0-2 06/05/2020    BLOODU TRACE-INTACT 06/05/2020    SPECGRAV 1.020 06/05/2020    GLUCOSEU 250 06/05/2020       Radiology:  IR LUMBAR PUNCTURE FOR DIAGNOSIS   Final Result   Successful fluoroscopic-guided lumbar puncture. CT ABDOMEN PELVIS W IV CONTRAST Additional Contrast? Radiologist Recommendation   Final Result   No acute abdominopelvic abnormality. MRI THORACIC SPINE WO CONTRAST   Final Result   No acute thoracic spine abnormality. No thoracic spinal canal or neural   foraminal stenosis. MRI CERVICAL SPINE WO CONTRAST   Final Result   No acute cervical spine abnormality. Mild degenerative spondylosis without significant cervical spinal canal   stenosis or high-grade neural foraminal stenosis. Mild left C4-C5 and right C6-C7 neural foraminal stenosis. MRI LUMBAR SPINE WO CONTRAST   Final Result   1. Mild L1-2 and L5-S1 degenerative disc height loss. 2. Moderate right L5 neural foraminal narrowing secondary to a right   foraminal L5-S1 disc protrusion. 3. Mild L1-2 spinal canal stenosis. CT ABDOMEN PELVIS WO CONTRAST   Final Result   1. No CT evidence of an acute intra-abdominal or intrapelvic process. 2.  No findings to suggest acute appendicitis; no ureter calculus or   hydronephrosis. 3. Probably reactive lymph nodes in the right upper quadrant, unchanged from   prior study. 4.  Mild extrahepatic bile duct dilatation status post cholecystectomy   typical of reservoir effect. 5.  Calcific atherosclerotic disease aorta. 6. Small, fat containing umbilical hernia. 7. Benign left adrenal adenoma requires no additional evaluation or follow-up.          IR LUMBAR PUNCTURE FOR DIAGNOSIS    (Results Pending)           Assessment/Plan:    Active Hospital Problems    Diagnosis    S/P TAVR (transcatheter aortic valve replacement) [Z95.2]     Priority: Medium    Sinus pause [I45.5]    GBS (Guillain-Kennett syndrome) ([facility]) [G61.0]    Bilateral leg weakness [R29.898]    ESRD (end stage renal disease) on dialysis [facility] [N18.6, Z99.2]    DM (diabetes mellitus), secondary, uncontrolled, w/neurologic complic ([facility] [A16.13, J54.32]    Nonischemic cardiomyopathy ([facility] [I42.8]     Bilateral lower extremity weakness numbness - unclear etiology. Exam seems inconsistent.  - MRI shows L5-S1 disc protrusion.  - discussed with Ortho Spine - recommended MRI cervical and thoracic spine to rule out epidural abscess, given elevated ESR and CRP. This was WNL  - neurology consulted and now signed off.  - s/p LP 12/11/2020, which was unrevealing.     ESRD on HD   - nephrology consulted and following.  - MWF HD     Chronic back pain   - continue home medication and muscle relaxant  - increase to Percocet from 7.5 to 10mg/325.   - follows with pain management as outpatient.     DM2 with neuropathy  - continue Lantus and medium dose SSI. - continue gabapentin and Cymbalta. Hyponatremia   - nephrology following.  - hold Celexa.     Anemia - likely due to ESRD.     Chronic diastolic CHF - no evidence of fluid overload     COPD - stable. Continue home inhalers     CAD -  Continue Pravachol. Holding ASA, Plavix, and Coreg for possible pacemaker. Bradycardia with pauses  - cardiology consulted and following.  - holding home ASA, Plavix, Coreg, and Diltiazem. - EP consulted for possible pacer placement    DVT Prophylaxis: Subcutaneous Heparin  Diet: DIET GENERAL; Daily Fluid Restriction: 1000 ml  Code Status: Full Code    PT/OT Eval Status: recommend ARU, but insurance denied. Planning for [facility].     Dispo - pending EP JAY Trujillo - CNP

## 2020-12-14 NOTE — PROGRESS NOTES
Occupational Therapy  OT follow up attempted. Pt is currently off the floor at this time.      Ad Rob, [address] [facility]

## 2020-12-14 NOTE — FLOWSHEET NOTE
12/14/20 0728 12/14/20 1100   Treatment   Time On 0728  --    Time Off  --  1100   Treatment Goal 4400 [id number]   Observations & Evaluations   Level of Consciousness Alert (0) Alert (0)   Oriented X 3 3   Vital Signs   BP (!) 174/91 (!) 166/57   Temp 97.8 °F (36.6 °C) 98 °F (36.7 °C)   Pulse 88 102   Resp 18 18   Weight 268 lb 4.8 oz (121.7 kg) 260 lb 12.9 oz (118.3 kg)   Percent Weight Change 1.33 -2.79   Pain Assessment   Pain Assessment 0-10 0-10   Pain Level 0 0   Technical Checks   RO Machine Log Sheet Completed Yes  --    Machine Alarm Self Test Completed  --    Machine Autotest Completed  --    Air Foam Detector Tested  --    Extracorporeal Circuit Tested for Integrity Yes  --    Treatment Initiation   Dialyze Hours 3.5  --    Treatment  Initiation Universal Precautions maintained;Lines secured to patient; Connections secured;Prime given;Venous Parameters set; Arterial Parameters set; Air foam detector engaged; Hemosafe Device; Saline line double clamped; Hemo-Safe Applied;Dialyzer;F180  --    During Hemodialysis Assessment   Blood Flow Rate (ml/min) 400 ml/min  --    Ultrafiltration Rate (ml/hr) 380 ml/hr  --    Arterial Pressure (mmHg) -160 mmHg  --    Venous Pressure (mmHg) 180  --    TMP 20  --    Access Visible Yes  --    Dialysis Bath   K+ (Potassium) 3  --    Ca+ (Calcium) 2.5  --    Na+ (Sodium) 138  --    HCO3 (Bicarb) 32  --    Hemodialysis Central Access Internal jugular Left Neck   Placement Date/Time: 10/14/19 0905   Pre-existing: Yes  Timeout: Patient;Procedure;Site/Side;Consent Confirmed; Appropriate Equipment;Sterile Technique using full body drape;Site prepped with chlorhexidine;Sterile drsg with biopatch; Correct Position  I... Site Assessment Clean;Dry; Intact  --    Access Interventions Aseptic Technique  --    Dressing Intervention Dressing reinforced  --    Dressing Status Clean;Dry; Intact  --    Post-Hemodialysis Assessment   Post-Treatment Procedures  --  Blood returned;Catheter capped, clamped and heparinized x 2 ports   Machine Disinfection Process  --  Acid/Vinegar Clean;Heat Disinfect; Exterior Machine Disinfection   Rinseback Volume (ml)  --  400 ml   Total Liters Processed (l/min)  --  69.4 l/min   Dialyzer Clearance  --  Lightly streaked   Duration of Treatment (minutes)  --  210 minutes   Hemodialysis Intake (ml)  --  400 ml   Hemodialysis Output (ml)  --  3810 ml   NET Removed (ml)  --  3410 ml   Tolerated Treatment  --  Good

## 2020-12-14 NOTE — PROGRESS NOTES
[facility]       Progress Note           Subjective/   44y.o. year old male who we are seeing in consultation for ESRD. The patient was seen and examined on dialysis; access is working. He is starting to cramp. He says he usually can handle about 3.5 kg UF. He says he cannot feel his legs. He tried to work for 1 hour with therapy yesterday but then just hurt all night. ROS: No fever or chills. Social: No family at bedside. Objective/   GEN:  Chronically ill, BP (!) 174/91   Pulse 88   Temp 97.8 °F (36.6 °C)   Resp 18   Ht 5' 9\" (1.753 m)   Wt 268 lb 4.8 oz (121.7 kg)   SpO2 92%   BMI 39.62 kg/m²      HEENT: non-icteric, no JVD  CV: S1, S2 without m/r/g; no LE edema  RESP: CTA B without w/r/r; breathing wnl  ABD: +bs, soft, nt, no hsm  SKIN: warm, no rashes  ACCESS: L [facility]    Data/  Recent Labs     12/12/20  0944 12/13/20  1134 12/14/20  0725   WBC 10.9 10.4 11.1*   HGB 9.4* 10.0* 10.3*   HCT 28.6* 30.2* 30.8*   MCV 90.9 90.4 88.8    273 314     Recent Labs     12/12/20  0944 12/13/20  1134 12/14/20  0725   * 123* 127*   K 4.8 4.6 4.5   CL 94* 88* 91*   CO2 24 25 27   GLUCOSE 154* 200* 88   BUN 25* 36* 43*   CREATININE 4.5* 5.6* 5.9*   LABGLOM 14* 11* 10*   GFRAA 17* 13* 12*       Assessment/Plan     ESRD.  - On HD MWF at [facility]. - Vascular access: TDC.     Hyponatremia. - Likely from fluid in an ESRD patient. - UF with HD. Fluid restriction.     Hypertension.  - BP readings better after stopping Hydralazine.  - On Torsemide, Carvedilol, Diltiazem, Imdur and Losartan.     Anemia.  - Aranesp 25mcg qweekly with HD qWednesday.   - Hgb 9.4 g/dL.     Bilateral LE Weakness.  - Plans per Neurology, s/p LP on 12/11

## 2020-12-14 NOTE — CONSULTS
[address] [address]                                  CONSULTATION    PATIENT NAME: Kike Dwyer                  :        1970  MED REC NO:   [id number]                          ROOM:       0114  ACCOUNT NO:   [de-identified]                           ADMIT DATE: 2020  PROVIDER:     Raquel Vieira MD    CONSULT DATE:  2020    CARDIAC ELECTROPHYSIOLOGY CONSULTATION    REASON FOR CONSULTATION:  Bradycardia. HISTORY OF PRESENT ILLNESS:  The patient is a 75-year-old man with  history of poorly-controlled diabetes mellitus, coronary artery disease,  status post TAVR, who was admitted on 2020 with abrupt onset lower  extremity weakness and paresthesias. He has undergone an extensive  evaluation, but no compelling cause for the lower extremity symptoms has  yet been identified. The patient reports that these symptoms were  relatively abrupt in onset and since at that time had been consistent  and unremitting. In the evening hours of 2020, the patient had a  brief period of transient sinus bradycardia with sinus pauses  interrupted by junctional escape beats. He has had brief episodes of  sinus node dysfunction in the past.  He underwent ambulatory event  monitoring for 2 weeks on 10/23/2019. This demonstrated sinus rhythm  with an average heart rate of 94 beats per minute. The lowest heart  rate recorded during the 2 week period is 54 beats per minute. He has  had 20 ECGs over the last 2 years. Only two of these demonstrated _____  episodes of sinus slowing. ECG telemetry monitoring from 2020 to  the present demonstrates only a 31 minute episode from 05:18 p.m. to  5:49 p.m., on 2020 with episodic sinus slowing and junctional  escape beats. The patient had been on Coreg at this time of admission  for his cardiomyopathy.   He was also on diltiazem for reasons, which are unclear. He has not had episodes of near syncope or syncope. He has  had recent falls, which he attributes to the lower extremity weakness. The last echo from 07/09/2020 demonstrates ejection fraction of 50% with  normal positioning and functioning of prosthetic aortic valve. PAST MEDICAL HISTORY:  1. Poorly-controlled diabetes mellitus. 2.  Stenotic bicuspid aortic valve, status post TAVR. 3.  Coronary artery disease, status post LAD stent. 4.  End-stage renal disease, on hemodialysis. 5.  Sleep apnea. MEDICATIONS:  At the time of admission include aspirin 81 mg p.o. daily,  calcium acetate 667 mg t.i.d., Coreg 12.5 mg p.o. b.i.d., Celexa 40 mg  p.o. q.d., Plavix 75 mg p.o. q.d. long-acting diltiazem 180 mg p.o.  q.d., Cymbalta 30 mg p.o. q.d., fluconazole 150 mg p.o. for one dose,  hydralazine 25 mg p.o. q.8 hours, isosorbide mononitrate 30 mg p.o.  q.d., Linzess 145 mcg p.o. daily, Losartan 50 mg p.o. daily, Percocet  one p.o. q.4 hours as needed for pain, Protonix 40 mg p.o. q.d.,  pravastatin 80 mg p.o. q.d., Lyrica 25 mg p.o. t.i.d., Seroquel 50 mg  p.o. q.h.s., Zanaflex 4 mg p.o. t.i.d., torsemide 100 mg to 200 mg p.o.  daily, trazodone 150 mg p.o. q.h.s., and insulin as directed. ALLERGIES:  Include MORPHINE. SOCIAL HISTORY:  The patient is a current everyday cigarette smoker. He  does not consume alcohol at this time. FAMILY HISTORY:  Remarkable for diabetes in the mother. There is  history of heart disease in the father. There is a history of heart  disease in two female siblings. REVIEW OF SYSTEMS:  Demonstrates no recent change in appetite or change  in weight. The patient has not had recent fever or chills. He has not  had near syncope or syncope. He does describe some epigastric burning  type discomfort intermittently over the past few weeks. He describes  the lower extremity weakness, which immediately preceded this admission. The discrete nature of the apparent sinus node dysfunction would suggest  a secondary cause. He has history of sleep apnea and he may have had  transient hypoxemia during that 31 minute period of time. He had been  receiving Coreg at the time of admission for history of mild  cardiomyopathy. It is unclear why he was taking the diltiazem. I would  be inclined to discontinue the diltiazem and restart the Coreg in a  lower dose. His primary issue at this time is the lower extremity  weakness and paresthesias and his consequent inability to walk. It  seems unlikely that the transient sinus node dysfunction would be  significantly contributed to this. He also has vascular access issues  related to his dialysis. He has failed AV fistula in the left arm and  currently he has a tunneled catheter in the left subclavian vein. If he  requires cardiac pacing, this would require a transvenous pacemaker from  the right subclavicular area. His bradycardia episodes are related to  sinus node dysfunction, so currently a leadless pacemaker, which  provides exclusively ventricular pacing would be less desirable. RECOMMENDATIONS:  1. Discontinue diltiazem. 2.  Resume Coreg at 6.25 mg p.o. b.i.d.  3.  Continue ECG telemetry monitoring. 4.  If symptomatic sinus bradycardia is identified, would pursue  atrial-based pacing through right subclavicular approach. Thanks for the opportunity to assist in the care of the patient. Please  contact me if you have any questions regarding his evaluation.         Rissa Lorenzo MD    D: 12/14/2020 10:04:22       T: 12/14/2020 12:26:06     [email or web address]  Job#: [id number]     Doc#: [id number]    CC:

## 2020-12-15 LAB
ANION GAP SERPL CALCULATED.3IONS-SCNC: 10 MMOL/L (ref 3–16)
BASOPHILS ABSOLUTE: 0.1 K/UL (ref 0–0.2)
BASOPHILS RELATIVE PERCENT: 0.6 %
BUN BLDV-MCNC: 29 MG/DL (ref 7–20)
CALCIUM SERPL-MCNC: 9.3 MG/DL (ref 8.3–10.6)
CHLORIDE BLD-SCNC: 95 MMOL/L (ref 99–110)
CO2: 24 MMOL/L (ref 21–32)
CREAT SERPL-MCNC: 5.5 MG/DL (ref 0.9–1.3)
EOSINOPHILS ABSOLUTE: 0.4 K/UL (ref 0–0.6)
EOSINOPHILS RELATIVE PERCENT: 3.1 %
GFR AFRICAN AMERICAN: 13
GFR NON-AFRICAN AMERICAN: 11
GLUCOSE BLD-MCNC: 125 MG/DL (ref 70–99)
GLUCOSE BLD-MCNC: 131 MG/DL (ref 70–99)
GLUCOSE BLD-MCNC: 159 MG/DL (ref 70–99)
GLUCOSE BLD-MCNC: 186 MG/DL (ref 70–99)
GLUCOSE BLD-MCNC: 83 MG/DL (ref 70–99)
HCT VFR BLD CALC: 29.7 % (ref 40.5–52.5)
HEMOGLOBIN: 9.6 G/DL (ref 13.5–17.5)
LYMPHOCYTES ABSOLUTE: 1.4 K/UL (ref 1–5.1)
LYMPHOCYTES RELATIVE PERCENT: 12.4 %
MCH RBC QN AUTO: 29.1 PG (ref 26–34)
MCHC RBC AUTO-ENTMCNC: 32.2 G/DL (ref 31–36)
MCV RBC AUTO: 90.4 FL (ref 80–100)
MONOCYTES ABSOLUTE: 0.9 K/UL (ref 0–1.3)
MONOCYTES RELATIVE PERCENT: 7.9 %
NEUTROPHILS ABSOLUTE: 8.8 K/UL (ref 1.7–7.7)
NEUTROPHILS RELATIVE PERCENT: 76 %
PDW BLD-RTO: 13.6 % (ref 12.4–15.4)
PERFORMED ON: ABNORMAL
PERFORMED ON: NORMAL
PLATELET # BLD: 280 K/UL (ref 135–450)
PMV BLD AUTO: 7.4 FL (ref 5–10.5)
POTASSIUM REFLEX MAGNESIUM: 5 MMOL/L (ref 3.5–5.1)
RBC # BLD: 3.29 M/UL (ref 4.2–5.9)
SODIUM BLD-SCNC: 129 MMOL/L (ref 136–145)
WBC # BLD: 11.6 K/UL (ref 4–11)

## 2020-12-15 PROCEDURE — 97116 GAIT TRAINING THERAPY: CPT

## 2020-12-15 PROCEDURE — 80048 BASIC METABOLIC PNL TOTAL CA: CPT

## 2020-12-15 PROCEDURE — 2700000000 HC OXYGEN THERAPY PER DAY

## 2020-12-15 PROCEDURE — 99232 SBSQ HOSP IP/OBS MODERATE 35: CPT | Performed by: NURSE PRACTITIONER

## 2020-12-15 PROCEDURE — 6370000000 HC RX 637 (ALT 250 FOR IP): Performed by: NURSE PRACTITIONER

## 2020-12-15 PROCEDURE — 6370000000 HC RX 637 (ALT 250 FOR IP): Performed by: INTERNAL MEDICINE

## 2020-12-15 PROCEDURE — 1200000000 HC SEMI PRIVATE

## 2020-12-15 PROCEDURE — 6360000002 HC RX W HCPCS: Performed by: INTERNAL MEDICINE

## 2020-12-15 PROCEDURE — 94761 N-INVAS EAR/PLS OXIMETRY MLT: CPT

## 2020-12-15 PROCEDURE — 94660 CPAP INITIATION&MGMT: CPT

## 2020-12-15 PROCEDURE — 85025 COMPLETE CBC W/AUTO DIFF WBC: CPT

## 2020-12-15 PROCEDURE — 94640 AIRWAY INHALATION TREATMENT: CPT

## 2020-12-15 PROCEDURE — 36415 COLL VENOUS BLD VENIPUNCTURE: CPT

## 2020-12-15 PROCEDURE — 2580000003 HC RX 258: Performed by: INTERNAL MEDICINE

## 2020-12-15 PROCEDURE — 97530 THERAPEUTIC ACTIVITIES: CPT

## 2020-12-15 RX ORDER — HYDROMORPHONE HCL 110MG/55ML
0.5 PATIENT CONTROLLED ANALGESIA SYRINGE INTRAVENOUS ONCE
Status: DISCONTINUED | OUTPATIENT
Start: 2020-12-15 | End: 2020-12-15

## 2020-12-15 RX ORDER — POLYETHYLENE GLYCOL 3350 17 G/17G
17 POWDER, FOR SOLUTION ORAL 2 TIMES DAILY
Status: DISCONTINUED | OUTPATIENT
Start: 2020-12-15 | End: 2020-12-21 | Stop reason: HOSPADM

## 2020-12-15 RX ORDER — BISACODYL 10 MG
10 SUPPOSITORY, RECTAL RECTAL ONCE
Status: DISCONTINUED | OUTPATIENT
Start: 2020-12-15 | End: 2020-12-15

## 2020-12-15 RX ORDER — HYDROMORPHONE HCL 110MG/55ML
1 PATIENT CONTROLLED ANALGESIA SYRINGE INTRAVENOUS ONCE
Status: COMPLETED | OUTPATIENT
Start: 2020-12-15 | End: 2020-12-15

## 2020-12-15 RX ADMIN — OXYCODONE HYDROCHLORIDE AND ACETAMINOPHEN 1 TABLET: 10; 325 TABLET ORAL at 22:06

## 2020-12-15 RX ADMIN — PREGABALIN 25 MG: 25 CAPSULE ORAL at 21:48

## 2020-12-15 RX ADMIN — SODIUM CHLORIDE, PRESERVATIVE FREE 10 ML: 5 INJECTION INTRAVENOUS at 21:56

## 2020-12-15 RX ADMIN — QUETIAPINE FUMARATE 50 MG: 25 TABLET ORAL at 18:42

## 2020-12-15 RX ADMIN — OXYCODONE HYDROCHLORIDE AND ACETAMINOPHEN 1 TABLET: 10; 325 TABLET ORAL at 14:27

## 2020-12-15 RX ADMIN — BISACODYL 5 MG: 5 TABLET, COATED ORAL at 14:27

## 2020-12-15 RX ADMIN — GLYCOPYRROLATE AND FORMOTEROL FUMARATE 2 PUFF: 9; 4.8 AEROSOL, METERED RESPIRATORY (INHALATION) at 08:11

## 2020-12-15 RX ADMIN — TIZANIDINE 2 MG: 4 TABLET ORAL at 14:27

## 2020-12-15 RX ADMIN — SODIUM CHLORIDE, PRESERVATIVE FREE 10 ML: 5 INJECTION INTRAVENOUS at 08:19

## 2020-12-15 RX ADMIN — OXYCODONE HYDROCHLORIDE AND ACETAMINOPHEN 1 TABLET: 10; 325 TABLET ORAL at 02:06

## 2020-12-15 RX ADMIN — OXYCODONE HYDROCHLORIDE AND ACETAMINOPHEN 1 TABLET: 10; 325 TABLET ORAL at 08:18

## 2020-12-15 RX ADMIN — INSULIN LISPRO 2 UNITS: 100 INJECTION, SOLUTION INTRAVENOUS; SUBCUTANEOUS at 18:41

## 2020-12-15 RX ADMIN — TORSEMIDE 100 MG: 100 TABLET ORAL at 08:18

## 2020-12-15 RX ADMIN — INSULIN LISPRO 2 UNITS: 100 INJECTION, SOLUTION INTRAVENOUS; SUBCUTANEOUS at 12:01

## 2020-12-15 RX ADMIN — LOSARTAN POTASSIUM 50 MG: 25 TABLET, FILM COATED ORAL at 08:18

## 2020-12-15 RX ADMIN — TIZANIDINE 2 MG: 4 TABLET ORAL at 21:48

## 2020-12-15 RX ADMIN — GABAPENTIN 100 MG: 100 CAPSULE ORAL at 21:48

## 2020-12-15 RX ADMIN — CARVEDILOL 6.25 MG: 6.25 TABLET, FILM COATED ORAL at 08:18

## 2020-12-15 RX ADMIN — HEPARIN SODIUM 5000 UNITS: 5000 INJECTION INTRAVENOUS; SUBCUTANEOUS at 21:49

## 2020-12-15 RX ADMIN — PREGABALIN 25 MG: 25 CAPSULE ORAL at 08:18

## 2020-12-15 RX ADMIN — PANTOPRAZOLE SODIUM 40 MG: 40 TABLET, DELAYED RELEASE ORAL at 14:27

## 2020-12-15 RX ADMIN — PRAVASTATIN SODIUM 80 MG: 80 TABLET ORAL at 21:56

## 2020-12-15 RX ADMIN — GABAPENTIN 100 MG: 100 CAPSULE ORAL at 08:18

## 2020-12-15 RX ADMIN — ISOSORBIDE MONONITRATE 30 MG: 30 TABLET, EXTENDED RELEASE ORAL at 08:18

## 2020-12-15 RX ADMIN — POLYETHYLENE GLYCOL 3350 17 G: 17 POWDER, FOR SOLUTION ORAL at 08:18

## 2020-12-15 RX ADMIN — TIZANIDINE 2 MG: 4 TABLET ORAL at 08:18

## 2020-12-15 RX ADMIN — INSULIN GLARGINE 70 UNITS: 100 INJECTION, SOLUTION SUBCUTANEOUS at 21:49

## 2020-12-15 RX ADMIN — DULOXETINE HYDROCHLORIDE 30 MG: 30 CAPSULE, DELAYED RELEASE ORAL at 08:18

## 2020-12-15 RX ADMIN — POLYETHYLENE GLYCOL 3350 17 G: 17 POWDER, FOR SOLUTION ORAL at 21:48

## 2020-12-15 RX ADMIN — TRAZODONE HYDROCHLORIDE 150 MG: 50 TABLET ORAL at 21:48

## 2020-12-15 RX ADMIN — PREGABALIN 25 MG: 25 CAPSULE ORAL at 14:27

## 2020-12-15 RX ADMIN — HEPARIN SODIUM 5000 UNITS: 5000 INJECTION INTRAVENOUS; SUBCUTANEOUS at 06:43

## 2020-12-15 RX ADMIN — HEPARIN SODIUM 5000 UNITS: 5000 INJECTION INTRAVENOUS; SUBCUTANEOUS at 14:27

## 2020-12-15 RX ADMIN — HYDROMORPHONE HYDROCHLORIDE 1 MG: 2 INJECTION INTRAMUSCULAR; INTRAVENOUS; SUBCUTANEOUS at 22:07

## 2020-12-15 RX ADMIN — CARVEDILOL 6.25 MG: 6.25 TABLET, FILM COATED ORAL at 18:48

## 2020-12-15 RX ADMIN — GABAPENTIN 100 MG: 100 CAPSULE ORAL at 14:27

## 2020-12-15 ASSESSMENT — ENCOUNTER SYMPTOMS
GASTROINTESTINAL NEGATIVE: 1
RESPIRATORY NEGATIVE: 1

## 2020-12-15 ASSESSMENT — PAIN SCALES - GENERAL
PAINLEVEL_OUTOF10: 9
PAINLEVEL_OUTOF10: 9

## 2020-12-15 ASSESSMENT — PAIN DESCRIPTION - LOCATION: LOCATION: BACK

## 2020-12-15 NOTE — PROGRESS NOTES
Physical Therapy  Facility/Department: [place] [address] REMOTE TELEMETRY  Daily Treatment Note  NAME: Pramod Melton  : 1970  MRN: [id number]    Date of Service: 12/15/2020    Discharge Recommendations:  Subacute/Skilled Nursing Facility   PT Equipment Recommendations  Other: Defer to facility    Assessment   Assessment Pt continues to progress but limited by motivation and generalized deconditioning. Questionable ataxia noted during gait training; pt c/o severe pain in BLE limiting gait progression. Treatment Diagnosis: Impaired sensation and mobility, weakness  Prognosis: Fair  Decision Making: Medium Complexity  Patient Education: OOB activities  Barriers to Learning: none  REQUIRES PT FOLLOW UP: Yes     Patient Diagnosis(es): The primary encounter diagnosis was Bilateral leg weakness. Diagnoses of Numbness and tingling of both legs and ESRD (end stage renal disease) on dialysis [facility] were also pertinent to this visit. has a past medical history of Ambulatory dysfunction, Aortic stenosis, Arthritis, Asthma, Bilateral hilar adenopathy syndrome, CAD (coronary artery disease), Cardiomyopathy ([address] CHF (congestive heart failure) ([address] Chronic pain, COPD (chronic obstructive pulmonary disease) ([address] Depression, Diabetes mellitus ([address] Difficult intravenous access, Emphysema of lung ([address] ESRD (end stage renal disease) on dialysis ([address] Fear of needles, Gastric ulcer, GERD (gastroesophageal reflux disease), Heart valve problem, Hemodialysis patient ([address] History of spinal fracture, Hx of blood clots, Hyperlipidemia, Hypertension, MI (myocardial infarction) ([address] Neuromuscular disorder ([address] Numbness and tingling in left arm, Pneumonia, PONV (postoperative nausea and vomiting), Prolonged emergence from general anesthesia, Sleep apnea, Stroke ([address] TIA (transient ischemic attack), and Unspecified diseases of blood and blood-forming organs.    has a past surgical history that includes Tonsillectomy; cyst removal (08/14/2013); Colonoscopy; Coronary angioplasty with stent (05/26/15); vascular surgery (aprx 2 years ago); Colonoscopy (2/29/2015); Upper gastrointestinal endoscopy (01/06/2016); Upper gastrointestinal endoscopy (01/29/2017); other surgical history (02/01/2017); Dialysis fistula creation (Left, 10/30/2017); Diagnostic Cardiac Cath Lab Procedure; other surgical history (2018); other surgical history (Bilateral, 06/26/2018); pr lap insertion tunneled intraperitoneal catheter (N/A, 9/21/2018); other surgical history (Right, 09/2018); Dialysis fistula creation (Left, 3/27/2019); other surgical history (05/28/2019); Endoscopy, colon, diagnostic; Catheter Removal (Right, 7/2/2019); and Aortic valve replacement (N/A, 10/15/2019). Restrictions  Restrictions/Precautions  Restrictions/Precautions: Fall Risk, Up as Tolerated, General Precautions  Required Braces or Orthoses?: No  Position Activity Restriction  Other position/activity restrictions: Up with assist, telemetry  Subjective   General  Chart Reviewed: Yes  Referring Practitioner: JAY Chacko CNP  Subjective  Subjective: Pt was in bed requiring encouragement to participate  Pain Screening  Patient Currently in Pain: Yes  Pain Assessment  Pain Assessment: Faces  Pain Level: 0  Pain Type: Chronic pain  Pain Location: Back  Vital Signs  Patient Currently in Pain: Yes       Orientation  Orientation  Overall Orientation Status: Within Functional Limits  Objective   Bed mobility  Supine to Sit: Supervision  Scooting: Supervision  Transfers  Sit to Stand: Contact guard assistance;2 Person Assistance  Stand to sit: Contact guard assistance;2 Person Assistance  Bed to Chair: Contact guard assistance;2 Person Assistance  Ambulation  Ambulation?: Yes  Ambulation 1  Surface: level tile  Device: Rolling Walker  Assistance: Contact guard assistance  Quality of Gait: inconsistent pattern  Gait Deviations: Slow Ольга; Increased ALANNAH; Decreased step length;Shuffles     Balance  Sitting - Static: Good  Sitting - Dynamic: Good  Standing - Static: Fair;-  Standing - Dynamic: Fair;-  Exercises  Comments: sitting EOB for approx 8 mins     AM-PAC Score     AM-PAC Inpatient Mobility without Stair Climbing Raw Score : 16 (12/15/20 1145)  AM-PAC Inpatient without Stair Climbing T-Scale Score : 45.54 (12/15/20 1145)  Mobility Inpatient CMS 0-100% Score: 40.64 (12/15/20 1145)  Mobility Inpatient without Stair CMS G-Code Modifier : CK (12/15/20 1145)       Goals  Short term goals  Time Frame for Short term goals: 1 week, 12/15/20 unless otherwise noted. Short term goal 1: Pt will perform bed mobility with independence; 12/13 Supervision  Short term goal 2: Pt will perform sit<>stand transfer with min A; 12/9 HERIBERTO plus, 12/13 max assist x 2 with RW  Short term goal 3: Pt will perform bed<>chair transfer with LRAD and min A; 12/9 HERIBERTO plus for all mobility, 12/13 max assist x 2 with RW  Short term goal 4: Pt will ambulate 20ft with LRAD and min A; 12/9 HERIBERTO plus with A of 2, 12/13 3 feet max assist x 2 with RW  Short term goal 5: By 12/11 pt will tolerate x 10-15 reps BLE exercise to assist with functional transfers and ambulation. ; 12/13 progressing  Patient Goals   Patient goals : \"To feel my legs again. \"    Plan    Plan  Times per week: 3-5x/wk  Times per day: Daily  Current Treatment Recommendations: Strengthening, Balance Training, Functional Mobility Training, Gait Training, Endurance Training, Safety Education & Training, Patient/Caregiver Education & Training  Safety Devices  Type of devices:  All fall risk precautions in place, Patient at risk for falls, Nurse notified, Call light within reach, Left in chair, Gait belt, Chair alarm in place  Restraints  Initially in place: No     Therapy Time   Individual Concurrent Group Co-treatment   Time In       1110   Time Out       1133   Minutes       23   Timed Code Treatment Minutes: 121 Leeanne Solorio, PTA

## 2020-12-15 NOTE — PROGRESS NOTES
Hospitalist Progress Note      PCP: Suzan Parra MD    Date of Admission: 12/4/2020    Chief Complaint: Bilateral lower extremity weakness, numbness, and inability to walk for 2 days. Hospital Course: 44 y.o. male who presented to [facility] [facility] with above complaint. He has a history of diabetes and neuropathy. He does have lower back pain for long time. He has been feeling more numbness for last 2 days and today he could not get up and walk. He felt like he does not have leg. He fell couple of times and there is no apparent injuries. Currently he does not feel below the mid calf bilateral lower extremities. And he is able to move some extent lower extremities but unable to walk. Subjective:  States he doesn't feel well. Nonspecific complaints. HR in low 100s.       Medications:  Reviewed    Infusion Medications   Scheduled Medications    pantoprazole  40 mg Oral BID AC    darbepoetin siddharth-polysorbate  25 mcg Intravenous Q7 Days    lidocaine  1 patch Transdermal Daily    insulin lispro  0-12 Units Subcutaneous TID WC    insulin lispro  0-6 Units Subcutaneous Nightly    [Held by provider] aspirin  81 mg Oral Daily    [Held by provider] carvedilol  12.5 mg Oral BID WC    [Held by provider] clopidogrel  75 mg Oral Daily    [Held by provider] dilTIAZem  180 mg Oral Daily    DULoxetine  30 mg Oral Daily    gabapentin  100 mg Oral TID    insulin glargine  70 Units Subcutaneous Nightly    isosorbide mononitrate  30 mg Oral Daily    linaclotide  145 mcg Oral QAM AC    losartan  50 mg Oral Daily    pravastatin  80 mg Oral Nightly    pregabalin  25 mg Oral TID    QUEtiapine  50 mg Oral QPM    glycopyrrolate-formoterol  2 puff Inhalation BID    tiZANidine  2 mg Oral TID    torsemide  100 mg Oral Daily    traZODone  150 mg Oral Nightly    sodium chloride flush  10 mL Intravenous 2 times per day    heparin (porcine)  5,000 Units Subcutaneous 3 times per day     PRN Meds: prochlorperazine, oxyCODONE-acetaminophen, calcium carbonate, albuterol sulfate HFA, heparin (porcine), sodium chloride flush, acetaminophen **OR** acetaminophen, polyethylene glycol, promethazine **OR** ondansetron      Intake/Output Summary (Last 24 hours) at 12/14/2020 1916  Last data filed at 12/14/2020 1906  Gross per 24 hour   Intake 880 ml   Output 4660 ml   Net -3780 ml       Physical Exam Performed:    /65   Pulse 98   Temp 97.6 °F (36.4 °C) (Oral)   Resp 16   Ht 5' 9\" (1.753 m)   Wt 260 lb 12.9 oz (118.3 kg)   SpO2 92%   BMI 38.51 kg/m²     General appearance: No apparent distress, appears stated age and cooperative. HEENT: Pupils equal, round, and reactive to light. Conjunctivae/corneas clear. Neck: Supple, with full range of motion. No jugular venous distention. Trachea midline. Respiratory:  Normal respiratory effort. Clear to auscultation, bilaterally without Rales/Wheezes/Rhonchi. Cardiovascular: Regular rate and rhythm with normal S1/S2 without murmurs, rubs or gallops. Abdomen: Soft, non-tender, non-distended with normal bowel sounds. Musculoskeletal: No clubbing, cyanosis or edema bilaterally. Full range of motion without deformity. Skin: Skin color, texture, turgor normal.  No rashes or lesions. Neurologic:  Abnormal sensation below the knee bilaterally. 3/5 weakness BLE.    Psychiatric: Alert and oriented, thought content appropriate, normal insight  Capillary Refill: Brisk,< 3 seconds   Peripheral Pulses: +2 palpable, equal bilaterally       Labs:   Recent Labs     12/12/20 0944 12/13/20  1134 12/14/20  0725   WBC 10.9 10.4 11.1*   HGB 9.4* 10.0* 10.3*   HCT 28.6* 30.2* 30.8*    273 314     Recent Labs     12/12/20  0944 12/13/20  1134 12/14/20  0725   * 123* 127*   K 4.8 4.6 4.5   CL 94* 88* 91*   CO2 24 25 27   BUN 25* 36* 43*   CREATININE 4.5* 5.6* 5.9*   CALCIUM 9.0 9.0 9.1     No results for input(s): AST, ALT, BILIDIR, BILITOT, ALKPHOS in the last 72 hours. No results for input(s): INR in the last 72 hours. No results for input(s): Shanta Chaney in the last 72 hours. Urinalysis:      Lab Results   Component Value Date    NITRU Negative 06/05/2020    WBCUA 10-20 06/05/2020    BACTERIA 2+ 06/05/2020    RBCUA 0-2 06/05/2020    BLOODU TRACE-INTACT 06/05/2020    SPECGRAV 1.020 06/05/2020    GLUCOSEU 250 06/05/2020       Radiology:  IR LUMBAR PUNCTURE FOR DIAGNOSIS   Final Result   Successful fluoroscopic-guided lumbar puncture. CT ABDOMEN PELVIS W IV CONTRAST Additional Contrast? Radiologist Recommendation   Final Result   No acute abdominopelvic abnormality. MRI THORACIC SPINE WO CONTRAST   Final Result   No acute thoracic spine abnormality. No thoracic spinal canal or neural   foraminal stenosis. MRI CERVICAL SPINE WO CONTRAST   Final Result   No acute cervical spine abnormality. Mild degenerative spondylosis without significant cervical spinal canal   stenosis or high-grade neural foraminal stenosis. Mild left C4-C5 and right C6-C7 neural foraminal stenosis. MRI LUMBAR SPINE WO CONTRAST   Final Result   1. Mild L1-2 and L5-S1 degenerative disc height loss. 2. Moderate right L5 neural foraminal narrowing secondary to a right   foraminal L5-S1 disc protrusion. 3. Mild L1-2 spinal canal stenosis. CT ABDOMEN PELVIS WO CONTRAST   Final Result   1. No CT evidence of an acute intra-abdominal or intrapelvic process. 2.  No findings to suggest acute appendicitis; no ureter calculus or   hydronephrosis. 3. Probably reactive lymph nodes in the right upper quadrant, unchanged from   prior study. 4.  Mild extrahepatic bile duct dilatation status post cholecystectomy   typical of reservoir effect. 5.  Calcific atherosclerotic disease aorta. 6. Small, fat containing umbilical hernia. 7. Benign left adrenal adenoma requires no additional evaluation or follow-up.                  Assessment/Plan:    Active Hospital Problems    Diagnosis    S/P TAVR (transcatheter aortic valve replacement) [Z95.2]     Priority: Medium    Sinus pause [I45.5]    GBS (Guillain-Antioch syndrome) ([facility]) [G61.0]    Bilateral leg weakness [R29.898]    ESRD (end stage renal disease) on dialysis ([facility] [N18.6, Z99.2]    DM (diabetes mellitus), secondary, uncontrolled, w/neurologic complic ([facility] [W87.71, K81.70]    Nonischemic cardiomyopathy ([facility] [I42.8]     Bilateral lower extremity weakness numbness - unclear etiology. Exam seems inconsistent.  - MRI shows L5-S1 disc protrusion.  - discussed with Ortho Spine - recommended MRI cervical and thoracic spine to rule out epidural abscess, given elevated ESR and CRP. This was WNL  - neurology consulted and now signed off.  - s/p LP 12/11/2020, which was unrevealing.     ESRD on HD   - nephrology consulted and following.  - MWF HD     Chronic back pain   - continue home medication and muscle relaxant  - increase to Percocet from 7.5 to 10mg/325.   - follows with pain management as outpatient.     DM2 with neuropathy  - continue Lantus and medium dose SSI. - continue gabapentin and Cymbalta. Hyponatremia   - nephrology following.  - hold Celexa.     Anemia - likely due to ESRD.     Chronic diastolic CHF - no evidence of fluid overload     COPD - stable. Continue home inhalers     CAD -  Continue Pravachol and BB. Continue to hold ASA and Plavix overnight in case of need for pacer. Bradycardia with pauses  - cardiology consulted and following.  - discontinue diltiazem. Resume Coreg at 6.25 mg.    - EP consulted for possible pacer placement    DVT Prophylaxis: Subcutaneous Heparin  Diet: DIET RENAL; Daily Fluid Restriction: 1000 ml  Dietary Nutrition Supplements: Diabetic Oral Supplement  Code Status: Full Code    PT/OT Eval Status: recommend ARU, but insurance denied. Planning for [facility].     Dispo - possibly tomorrow if HR stable    JAY Washington - CNP

## 2020-12-15 NOTE — PROGRESS NOTES
[address]  Cardiology  Progress Note    Admission date:  2020    Reason for follow up visit: Bradycardia    HPI/CC: Candace Garcia is a 44 y.o. male who was admitted 2020 for lower extremity weakness. Cardiology consulted 2020 for sinus bradycardia/pauses. Diltiazem stopped. Rhythm has been sinus 70s. Subjective: Denies chest pain, shortness of breath, palpitations and dizziness. Vitals:  Blood pressure 111/62, pulse 83, temperature 98.5 °F (36.9 °C), temperature source Oral, resp. rate 18, height 5' 9\" (1.753 m), weight 257 lb 4.4 oz (116.7 kg), SpO2 95 %.   Temp  Av.9 °F (36.6 °C)  Min: 97.6 °F (36.4 °C)  Max: 98.5 °F (36.9 °C)  Pulse  Av  Min: 83  Max: 98  BP  Min: 103/65  Max: 145/72  SpO2  Av.9 %  Min: 90 %  Max: 96 %  FiO2   Av %  Min: 30 %  Max: 30 %    24 hour I/O    Intake/Output Summary (Last 24 hours) at 12/15/2020 1555  Last data filed at 12/15/2020 1207  Gross per 24 hour   Intake 840 ml   Output 325 ml   Net 515 ml     Current Facility-Administered Medications   Medication Dose Route Frequency Provider Last Rate Last Admin    polyethylene glycol (GLYCOLAX) packet 17 g  17 g Oral BID Aram Distance, APRN - CNP        HYDROmorphone (DILAUDID) injection 1 mg  1 mg Intravenous Once Wendy Olivas MD        pantoprazole (PROTONIX) tablet 40 mg  40 mg Oral BID AC Aram Distance, APRN - CNP   40 mg at 12/15/20 1427    carvedilol (COREG) tablet 6.25 mg  6.25 mg Oral BID WC Aram Distance, APRN - CNP   6.25 mg at 12/15/20 0818    darbepoetin siddharth-polysorbate (ARANESP) injection 25 mcg  25 mcg Intravenous Q7 Days Laurel Chong MD   25 mcg at 20 1224    prochlorperazine (COMPAZINE) injection 5 mg  5 mg Intravenous Q6H PRN JAY Dill NP   5 mg at 20 1317    oxyCODONE-acetaminophen (PERCOCET)  MG per tablet 1 tablet  1 tablet Oral A1P PRN JAY Baum - CNP   1 tablet at 12/15/20 1427    calcium carbonate (TUMS) chewable tablet 500 mg  500 mg Oral TID PRN Willim Day, APRN - CNP   500 mg at 12/14/20 1003    albuterol sulfate  (90 Base) MCG/ACT inhaler 2 puff  2 puff Inhalation Q6H PRN Myesha Cloud MD   2 puff at 12/06/20 1902    heparin (porcine) injection 4,000 Units  4,000 Units Intracatheter PRN Junior Garcia MD   4,000 Units at 12/09/20 1224    lidocaine 4 % external patch 1 patch  1 patch Transdermal Daily Willim Day, APRN - CNP   1 patch at 12/15/20 0818    insulin lispro (HUMALOG) injection vial 0-12 Units  0-12 Units Subcutaneous TID WC JAY Chi - CNP   2 Units at 12/15/20 1201    insulin lispro (HUMALOG) injection vial 0-6 Units  0-6 Units Subcutaneous Nightly JAY Chi CNP   0 Units at 12/14/20 2121    aspirin chewable tablet 81 mg  81 mg Oral Daily Myesha Cloud MD   81 mg at 12/13/20 0845    clopidogrel (PLAVIX) tablet 75 mg  75 mg Oral Daily Myesha Cloud MD   75 mg at 12/13/20 0845    DULoxetine (CYMBALTA) extended release capsule 30 mg  30 mg Oral Daily Myesha Cloud MD   30 mg at 12/15/20 0818    gabapentin (NEURONTIN) capsule 100 mg  100 mg Oral TID Myesha Cloud MD   100 mg at 12/15/20 1427    insulin glargine (LANTUS) injection vial 70 Units  70 Units Subcutaneous Nightly Myesha Cloud MD   70 Units at 12/14/20 2141    isosorbide mononitrate (IMDUR) extended release tablet 30 mg  30 mg Oral Daily Myesha Cloud MD   30 mg at 12/15/20 0818    linaclotide (LINZESS) capsule 145 mcg  145 mcg Oral QAM AC Myesha Cloud MD        losartan (COZAAR) tablet 50 mg  50 mg Oral Daily Myesha Cloud MD   50 mg at 12/15/20 0818    pravastatin (PRAVACHOL) tablet 80 mg  80 mg Oral Nightly Myesha Cloud MD   80 mg at 12/14/20 2121    pregabalin (LYRICA) capsule 25 mg  25 mg Oral TID Myesha Cloud MD   25 mg at 12/15/20 1427    QUEtiapine (SEROQUEL) tablet 50 mg  50 mg Oral QPM Myesha Cloud MD   50 mg at 12/14/20 1814    glycopyrrolate-formoterol (BEVESPI) 9-4.8 MCG/ACT inhaler 2 puff  2 puff Inhalation BID Luis Enrique Sifuentes MD   2 puff at 12/15/20 0811    tiZANidine (ZANAFLEX) tablet 2 mg  2 mg Oral TID Luis Enrique Sifuentes MD   2 mg at 12/15/20 1427    torsemide (DEMADEX) tablet 100 mg  100 mg Oral Daily Luis Enrique Sifuentes MD   100 mg at 12/15/20 0818    traZODone (DESYREL) tablet 150 mg  150 mg Oral Nightly Luis Enrique Sifuentes MD   150 mg at 12/14/20 2121    sodium chloride flush 0.9 % injection 10 mL  10 mL Intravenous 2 times per day Luis Enrique Sifuentes MD   10 mL at 12/15/20 0819    sodium chloride flush 0.9 % injection 10 mL  10 mL Intravenous PRN Luis Enrique Sifuentes MD        acetaminophen (TYLENOL) tablet 650 mg  650 mg Oral Q6H PRN Luis Enrique Sifuentes MD        Or    acetaminophen (TYLENOL) suppository 650 mg  650 mg Rectal Q6H PRN Luis Enrique Sifuentes MD        promethazine (PHENERGAN) tablet 12.5 mg  12.5 mg Oral Q6H PRN Luis Enrique Sifuentes MD   12.5 mg at 12/07/20 1653    Or    ondansetron (ZOFRAN) injection 4 mg  4 mg Intravenous Q6H PRN Luis Enrique Sifuentes MD   4 mg at 12/14/20 1954    heparin (porcine) injection 5,000 Units  5,000 Units Subcutaneous 3 times per day Luis Enrique Sifuentes MD   5,000 Units at 12/15/20 1427     Review of Systems   Constitutional: Negative. Respiratory: Negative. Cardiovascular: Negative. Gastrointestinal: Negative. Neurological: Negative.       Objective:     Telemetry monitor: SR    Physical Exam:  Constitutional:  Comfortable and alert, NAD, appears older than stated age  Eyes: PERRL, sclera nonicteric  Neck:  Supple, no masses, no thyroidmegaly, no JVD  Skin:  Warm and dry; no rash or lesions  Heart: Regular, normal apex, S1 and S2 normal, +murmur  Lungs:  Normal respiratory effort; clear; no wheezing/rhonchi/rales  Abdomen: soft, non tender, + bowel sounds  Extremities:  No edema or cyanosis; no clubbing  Neuro: alert and oriented, moves legs and arms equally, normal mood and affect    Data Reviewed: ABIs 6/8/2020:  DESTINEY's indicate mild bilateral lower extremity arterial disease.     <50% stenoses in the bilateral common, superficial femoral and popliteal     arteries.  Tibial vessels not well visualized.       Echo 7/9/2020:  Low Normal systolic function with an estimated ejection fraction of 50%.   Left ventricular function and wall motion is difficult to estimate due to   poor endocardial visualization.   Grade I diastolic dysfunction with normal filing pressure.   Normally functioning TAVR 29 mm Langford Leyda S3 bioprosthetic valve in   aortic position appears well seated with a max velocity of 2.11 m/sec,   maximum gradient of 18 mmHg and a mean gradient of 8 mmHg.   There is no evidence of aortic regurgitation.     Coronary angiogram 8/22/2019:  LM <20%  LAD patent stent  Cx <20%  RCA 30%  Severe AS by echo  Proceed w/ TAVR    Lab Reviewed:     Renal Profile:  Lab Results   Component Value Date    CREATININE 5.5 12/15/2020    BUN 29 12/15/2020     12/15/2020    K 5.0 12/15/2020    CL 95 12/15/2020    CO2 24 12/15/2020     CBC:    Lab Results   Component Value Date    WBC 11.6 12/15/2020    RBC 3.29 12/15/2020    HGB 9.6 12/15/2020    HCT 29.7 12/15/2020    MCV 90.4 12/15/2020    RDW 13.6 12/15/2020     12/15/2020     BNP:    Lab Results   Component Value Date    PROBNP 3,866 04/10/2020    PROBNP 2,654 11/06/2019    PROBNP 4,929 10/31/2019    PROBNP 10,514 10/18/2019    PROBNP 7,601 10/15/2019     Fasting Lipid Panel:    Lab Results   Component Value Date    CHOL 170 08/22/2019    HDL 49 08/22/2019    TRIG 231 08/22/2019     Cardiac Enzymes:  CK/MbTroponin  Lab Results   Component Value Date    CKTOTAL 167 06/05/2020    TROPONINI 0.04 12/07/2020     PT/ INR   Lab Results   Component Value Date    INR 1.00 12/07/2020    INR 0.91 04/25/2020    INR 0.89 02/03/2020    PROTIME 11.6 12/07/2020    PROTIME 10.6 04/25/2020    PROTIME 10.3 02/03/2020     PTT No results found for: PTT   Lab Results   Component Value Date    MG 2.10 04/26/2020      Lab Results   Component Value Date    TSH 0.90 06/05/2020     All labs and imaging reviewed today    Assessment:  Bradycardia/transient sinus pauses: noted 12/12/2020, no recurrence  CAD: no angina; patent stent on angiogram 8/2019; s/p PATRICIA LAD 2015  Bicuspid AV/severe AS: s/p TAVR 10/2019  PVD: s/p PTA/stent R external iliac 5/2019  HTN: stable  HLD: close to goal, LDL 75, statin  ESRD: on HD MWF; follows with Dr. Mark Sahu  DM: uncontrolled, hgb a1c 14  Anemia  ZAINAB  History of CVA    Plan:   1. Holding diltiazem due to bradycardia  2. Continue carvedilol 6.25 mg BID  3. Consider pacing if symptomatic bradycardia noted  4. Cardiac monitor at discharge  5.  Discharge planning for tomorrow    Theresa Quevedo, APRN-CNP  [address]  [phone]

## 2020-12-15 NOTE — CARE COORDINATION
Writer spoke with Wenceslao/admissions at [facility], they can accept pt today. Ane Bidding states she will need to restart precert if does not go today, but will not hold up discharge to wait for full precert since pt has a dual plan and can go under Medicaid first.  Will need rapid Covid test day of discharge. Will continue to follow and coordinate discharge arrangements.   REINALDO Harman-NAVJOT

## 2020-12-15 NOTE — PROGRESS NOTES
Comprehensive Nutrition Assessment    Type and Reason for Visit:  Reassess    Nutrition Recommendations/Plan:   1. Continue renal diet with 1000 mL FR  2. Decrease ONS to once daily d/t FR  3. Consider addition of bowel regimen - no BM x10 days  4. Monitor nutrition adequacy, pertinent labs, bowel habits, wt changes, and clinical progress    Nutrition Assessment:  Follow up: S/p LP - unrevealing. Diet modified to renal with 1000 mL FR. Pt nutritionally improving AEB improving appetite. Pt reports consuming greater than 1 meal daily at this time, noted % PO intakes per EMR. Pt continues to complain of abdominal pain and constipation. Need to consider addition of bowel regimen, no BM x11 days. ONS added on 12/14, will decrease to once daily d/t FR. Pt declined nutrition education this date, refused all handouts. Will continue to monitor. Malnutrition Assessment:  Malnutrition Status:   At risk for malnutrition (Comment)      Estimated Daily Nutrient Needs:  Energy (kcal):  7277-5458 kcal; Weight Used for Energy Requirements:  Ideal(73 kg)     Protein (g):  73-88 g; Weight Used for Protein Requirements:  Ideal(1.0-1.2 g/kg)        Fluid (ml/day):   ; Method Used for Fluid Requirements:  1 ml/kcal      Nutrition Related Findings:  -11 L per I&Os, +2 generalized edema, constipation, active BS, A1c of 10.5% on 12/5/20      Wounds:  None       Current Nutrition Therapies:    DIET RENAL; Daily Fluid Restriction: 1000 ml  Dietary Nutrition Supplements: Diabetic Oral Supplement    Anthropometric Measures:  · Height: 5' 9\" (175.3 cm)  · Current Body Weight: 257 lb (116.6 kg)   · Admission Body Weight: 272 lb (123.4 kg)(bed scale)    · Usual Body Weight: 263 lb (119.3 kg)(bed scale 6/10/20)     · Ideal Body Weight: 160 lbs; % Ideal Body Weight 162.5 %   · BMI: 37.9  · BMI Categories: Obese Class 2 (BMI 35.0 -39.9)       Nutrition Diagnosis:   · Inadequate oral intake related to inadequate protein-energy intake as evidenced by intake 0-25%, nausea, vomiting, constipation      Nutrition Interventions:   Food and/or Nutrient Delivery:  Continue Current Diet, Modify Oral Nutrition Supplement  Nutrition Education/Counseling:  Education declined   Coordination of Nutrition Care:  Continue to monitor while inpatient    Goals:  Pt will consume greater than 50% of meals and ONS w/o GI distrubances       Nutrition Monitoring and Evaluation:   Food/Nutrient Intake Outcomes:  Food and Nutrient Intake, Supplement Intake  Physical Signs/Symptoms Outcomes:  Biochemical Data, Constipation, Weight     Discharge Planning:    Continue current diet, Continue Oral Nutrition Supplement     Electronically signed by oRse Lopez MS, RD, LD on 12/15/20 at 12:33 PM EST    Contact: [phone]

## 2020-12-15 NOTE — PROGRESS NOTES
[facility]       Progress Note           Subjective/   44y.o. year old male who we are seeing in consultation for ESRD. Patient is upset about legs. He is not sleeping at night due to pain. No issues with dialysis. He is considering rehab options     ROS: No fever or chills. Having back pain. Social: No family at bedside. Objective/   GEN:  Chronically ill, /62   Pulse 83   Temp 98.5 °F (36.9 °C) (Oral)   Resp 18   Ht 5' 9\" (1.753 m)   Wt 257 lb 4.4 oz (116.7 kg)   SpO2 95%   BMI 37.99 kg/m²      HEENT: non-icteric, no JVD  CV: S1, S2 without m/r/g; no LE edema  RESP: CTA B without w/r/r; breathing wnl  ABD: +bs, soft, nt, no hsm  SKIN: warm, no rashes  ACCESS: [facility]    Data/  Recent Labs     12/13/20  1134 12/14/20  0725 12/15/20  1147   WBC 10.4 11.1* 11.6*   HGB 10.0* 10.3* 9.6*   HCT 30.2* 30.8* 29.7*   MCV 90.4 88.8 90.4    314 280     Recent Labs     12/13/20  1134 12/14/20  0725 12/15/20  1147   * 127* 129*   K 4.6 4.5 5.0   CL 88* 91* 95*   CO2 25 27 24   GLUCOSE 200* 88 125*   BUN 36* 43* 29*   CREATININE 5.6* 5.9* 5.5*   LABGLOM 11* 10* 11*   GFRAA 13* 12* 13*       Assessment/Plan     ESRD.  - On HD MWF at [facility]. - Vascular access: TDC.     Hyponatremia. - Likely from fluid in an ESRD patient. - UF with HD. Fluid restriction.     Hypertension.  - BP readings better after stopping Hydralazine.  - On Torsemide, Carvedilol, Diltiazem, Imdur and Losartan.     Anemia.  - Aranesp 25mcg qweekly with HD qWednesday. - Hgb 9.6 g/dL.     Bilateral LE Weakness. - Appears to be peripheral neuropathy.   Poor chance of improvement

## 2020-12-15 NOTE — CARE COORDINATION
PT/OT informed writer that pt does not want to go to [facility] as it's too far for his wife to drive. Writer met with pt, he wants to go to [facility]. Writer faxed referral and spoke with Kandi/HUDSON, she agrees to review and has a bed available. Pt states he would need transport to HD MWF as his wife cannot drive him, but pt can go via wheelchair. Will await response from [facility]. Writer spoke with Willian Wolfe, cancelled referral.  REINALDO Mcdonald-RN     6028 Addendum:  [facility] can accept but pt's insurance does not cover HD transport until 1/1 and they called pt's wife to see if she can transport. Then, Hailey Hernandez NP notified Rachel Israel that pt actually wants to go to [facility] now. Writer met with pt, he states he forgot the name of [facility] but yes that is where he wants to go. Writer explained they cannot accept smokers, pt states he hasnt smoked now for 11days and he doesn't need to smoke anymore. Writer spoke with [facility], she agreed to review and hope to have bed tomorrow afternoon, after pt's HD. Writer will await acceptance by [facility] and Jose R Keyes NP.   LORETTA Mcdonald

## 2020-12-15 NOTE — PROGRESS NOTES
12/15/20 0333   NIV Type   Skin Protection for O2 Device Yes   Equipment Type v60   Mode CPAP   Mask Type Full face mask   Mask Size Medium   Settings/Measurements   CPAP/EPAP 10 cmH2O   Resp 15   FiO2  30 %   Vt Exhaled 639 mL   Mask Leak (lpm) 22 lpm   Comfort Level Good   Using Accessory Muscles No   SpO2 97   Alarm Settings   Alarms On Y   Press Low Alarm 6 cmH2O   High Pressure Alarm 25 cmH2O   Apnea (secs) 20 secs   Resp Rate Low Alarm 6   High Respiratory Rate 40 br/min

## 2020-12-15 NOTE — CARE COORDINATION
[facility] think they will be able to accept pt tomorrow afternoon. Writer updated pt, he is happy to stay overnight.   REINALDO Ortega-RN

## 2020-12-15 NOTE — PROGRESS NOTES
Occupational Therapy  Facility/Department: [place] [address] REMOTE TELEMETRY  Daily Treatment Note  NAME: Agustin De Santiago  : 1970  MRN: [id number]    Date of Service: 12/15/2020    Discharge Recommendations:  Subacute/Skilled Nursing Facility          Assessment   Performance deficits / Impairments: Decreased functional mobility ; Decreased ADL status; Decreased sensation;Decreased balance;Decreased strength;Decreased safe awareness;Decreased cognition  Assessment: Pt continues to require assistance x2 for transfers and mobility due to decreased sensation and strength in BLEs. Pt would benefit from ongoing OT in order to increase function status. OT Education: Precautions;OT Role;ADL Adaptive Strategies;Transfer Training;Plan of Care  Patient Education: disease specific: importance of calling nursing for assist with transfers/standing to use urinal  due to decreased LE sensation, falls, weakness  REQUIRES OT FOLLOW UP: Yes  Activity Tolerance  Activity Tolerance: Patient Tolerated treatment well  Activity Tolerance: Pt initially declining therapy due to back pain, but agreeable to participate. Pt able to conversate during therapy with no signs or symptoms of pain this date  Safety Devices  Type of devices: Call light within reach; Chair alarm in place; Left in chair;Nurse notified; All fall risk precautions in place         Patient Diagnosis(es): The primary encounter diagnosis was Bilateral leg weakness. Diagnoses of Numbness and tingling of both legs and ESRD (end stage renal disease) on dialysis [facility]) were also pertinent to this visit.       has a past medical history of Ambulatory dysfunction, Aortic stenosis, Arthritis, Asthma, Bilateral hilar adenopathy syndrome, CAD (coronary artery disease), Cardiomyopathy ([address] CHF (congestive heart failure) ([address] Chronic pain, COPD (chronic obstructive pulmonary disease) ([address] Depression, Diabetes mellitus ([address] Difficult intravenous access, Emphysema of lung ([address] ESRD (end stage renal disease) on dialysis ([address] Fear of needles, Gastric ulcer, GERD (gastroesophageal reflux disease), Heart valve problem, Hemodialysis patient ([address] History of spinal fracture, Hx of blood clots, Hyperlipidemia, Hypertension, MI (myocardial infarction) ([address] Neuromuscular disorder ([address] Numbness and tingling in left arm, Pneumonia, PONV (postoperative nausea and vomiting), Prolonged emergence from general anesthesia, Sleep apnea, Stroke ([address] TIA (transient ischemic attack), and Unspecified diseases of blood and blood-forming organs. has a past surgical history that includes Tonsillectomy; cyst removal (08/14/2013); Colonoscopy; Coronary angioplasty with stent (05/26/15); vascular surgery (aprx 2 years ago); Colonoscopy (2/29/2015); Upper gastrointestinal endoscopy (01/06/2016); Upper gastrointestinal endoscopy (01/29/2017); other surgical history (02/01/2017); Dialysis fistula creation (Left, 10/30/2017); Diagnostic Cardiac Cath Lab Procedure; other surgical history (2018); other surgical history (Bilateral, 06/26/2018); pr lap insertion tunneled intraperitoneal catheter (N/A, 9/21/2018); other surgical history (Right, 09/2018); Dialysis fistula creation (Left, 3/27/2019); other surgical history (05/28/2019); Endoscopy, colon, diagnostic; Catheter Removal (Right, 7/2/2019); and Aortic valve replacement (N/A, 10/15/2019). Restrictions  Restrictions/Precautions  Restrictions/Precautions: Fall Risk, Up as Tolerated, General Precautions  Required Braces or Orthoses?: No  Position Activity Restriction  Other position/activity restrictions:  Up with assist, telemetry  Subjective   General  Chart Reviewed: Yes  Patient assessed for rehabilitation services?: Yes  Response to previous treatment: Patient with no complaints from previous session  Family / Caregiver Present: No  Subjective  Subjective: Pt agreeable to OT with encouragement  General Comment  Comments: OT/PT cotx to address deficits in Recommendations: Strengthening, Balance Training, Functional Mobility Training, Endurance Training, Safety Education & Training, Neuromuscular Re-education, Self-Care / ADL, Patient/Caregiver Education & Training  G-Code     OutComes Score                                                  AM-PAC Score        AM-PAC Inpatient Daily Activity Raw Score: 14 (12/15/20 1158)  AM-PAC Inpatient ADL T-Scale Score : 33.39 (12/15/20 1158)  ADL Inpatient CMS 0-100% Score: 59.67 (12/15/20 1158)  ADL Inpatient CMS G-Code Modifier : CK (12/15/20 1158)    Goals  Short term goals  Time Frame for Short term goals: 7 days 12/20/20  Short term goal 1: Pt will complete toilet transfer with MIN A--12/15 ongoing  Short term goal 2: Pt will complete LB dressing with MIN A with use of AE as needed--  Short term goal 3: Pt will complete short house hold distacne with RW with MIN A-- 12/15 ongoing  Patient Goals   Patient goals :  Will complete ADLs with independent       Therapy Time   Individual Concurrent Group Co-treatment   Time In       1110   Time Out       1133   Minutes       23   Timed Code Treatment Minutes: [address], OTR/L

## 2020-12-16 LAB
ANION GAP SERPL CALCULATED.3IONS-SCNC: 12 MMOL/L (ref 3–16)
BASOPHILS ABSOLUTE: 0.1 K/UL (ref 0–0.2)
BASOPHILS RELATIVE PERCENT: 0.6 %
BUN BLDV-MCNC: 40 MG/DL (ref 7–20)
CALCIUM SERPL-MCNC: 9.6 MG/DL (ref 8.3–10.6)
CHLORIDE BLD-SCNC: 92 MMOL/L (ref 99–110)
CO2: 26 MMOL/L (ref 21–32)
CREAT SERPL-MCNC: 6.6 MG/DL (ref 0.9–1.3)
EOSINOPHILS ABSOLUTE: 0.4 K/UL (ref 0–0.6)
EOSINOPHILS RELATIVE PERCENT: 4 %
GFR AFRICAN AMERICAN: 11
GFR NON-AFRICAN AMERICAN: 9
GLUCOSE BLD-MCNC: 118 MG/DL (ref 70–99)
GLUCOSE BLD-MCNC: 141 MG/DL (ref 70–99)
GLUCOSE BLD-MCNC: 168 MG/DL (ref 70–99)
GLUCOSE BLD-MCNC: 205 MG/DL (ref 70–99)
GLUCOSE BLD-MCNC: 95 MG/DL (ref 70–99)
HCT VFR BLD CALC: 31.1 % (ref 40.5–52.5)
HEMOGLOBIN: 10.1 G/DL (ref 13.5–17.5)
LYMPHOCYTES ABSOLUTE: 1.4 K/UL (ref 1–5.1)
LYMPHOCYTES RELATIVE PERCENT: 13.6 %
MCH RBC QN AUTO: 29.6 PG (ref 26–34)
MCHC RBC AUTO-ENTMCNC: 32.4 G/DL (ref 31–36)
MCV RBC AUTO: 91.5 FL (ref 80–100)
MONOCYTES ABSOLUTE: 0.9 K/UL (ref 0–1.3)
MONOCYTES RELATIVE PERCENT: 8.8 %
NEUTROPHILS ABSOLUTE: 7.5 K/UL (ref 1.7–7.7)
NEUTROPHILS RELATIVE PERCENT: 73 %
PDW BLD-RTO: 13.8 % (ref 12.4–15.4)
PERFORMED ON: ABNORMAL
PLATELET # BLD: 275 K/UL (ref 135–450)
PMV BLD AUTO: 7.4 FL (ref 5–10.5)
POTASSIUM REFLEX MAGNESIUM: 5.1 MMOL/L (ref 3.5–5.1)
RBC # BLD: 3.41 M/UL (ref 4.2–5.9)
SODIUM BLD-SCNC: 130 MMOL/L (ref 136–145)
WBC # BLD: 10.3 K/UL (ref 4–11)

## 2020-12-16 PROCEDURE — 99232 SBSQ HOSP IP/OBS MODERATE 35: CPT | Performed by: NURSE PRACTITIONER

## 2020-12-16 PROCEDURE — 94640 AIRWAY INHALATION TREATMENT: CPT

## 2020-12-16 PROCEDURE — 97530 THERAPEUTIC ACTIVITIES: CPT

## 2020-12-16 PROCEDURE — 6370000000 HC RX 637 (ALT 250 FOR IP): Performed by: INTERNAL MEDICINE

## 2020-12-16 PROCEDURE — 1200000000 HC SEMI PRIVATE

## 2020-12-16 PROCEDURE — 94761 N-INVAS EAR/PLS OXIMETRY MLT: CPT

## 2020-12-16 PROCEDURE — 97535 SELF CARE MNGMENT TRAINING: CPT

## 2020-12-16 PROCEDURE — 2700000000 HC OXYGEN THERAPY PER DAY

## 2020-12-16 PROCEDURE — 80048 BASIC METABOLIC PNL TOTAL CA: CPT

## 2020-12-16 PROCEDURE — 90935 HEMODIALYSIS ONE EVALUATION: CPT

## 2020-12-16 PROCEDURE — 6360000002 HC RX W HCPCS: Performed by: INTERNAL MEDICINE

## 2020-12-16 PROCEDURE — 85025 COMPLETE CBC W/AUTO DIFF WBC: CPT

## 2020-12-16 PROCEDURE — 6370000000 HC RX 637 (ALT 250 FOR IP): Performed by: NURSE PRACTITIONER

## 2020-12-16 PROCEDURE — 97110 THERAPEUTIC EXERCISES: CPT

## 2020-12-16 PROCEDURE — 94660 CPAP INITIATION&MGMT: CPT

## 2020-12-16 PROCEDURE — 36415 COLL VENOUS BLD VENIPUNCTURE: CPT

## 2020-12-16 PROCEDURE — 97116 GAIT TRAINING THERAPY: CPT

## 2020-12-16 RX ORDER — DOXYCYCLINE HYCLATE 100 MG
100 TABLET ORAL EVERY 12 HOURS SCHEDULED
Status: COMPLETED | OUTPATIENT
Start: 2020-12-16 | End: 2020-12-17

## 2020-12-16 RX ORDER — LACTULOSE 10 G/15ML
20 SOLUTION ORAL ONCE
Status: COMPLETED | OUTPATIENT
Start: 2020-12-16 | End: 2020-12-16

## 2020-12-16 RX ADMIN — MAGNESIUM CITRATE 296 ML: 1.75 LIQUID ORAL at 17:04

## 2020-12-16 RX ADMIN — HEPARIN SODIUM 5000 UNITS: 5000 INJECTION INTRAVENOUS; SUBCUTANEOUS at 20:40

## 2020-12-16 RX ADMIN — DOXYCYCLINE HYCLATE 100 MG: 100 TABLET, COATED ORAL at 20:38

## 2020-12-16 RX ADMIN — TRAZODONE HYDROCHLORIDE 150 MG: 50 TABLET ORAL at 20:38

## 2020-12-16 RX ADMIN — PANTOPRAZOLE SODIUM 40 MG: 40 TABLET, DELAYED RELEASE ORAL at 06:01

## 2020-12-16 RX ADMIN — OXYCODONE HYDROCHLORIDE AND ACETAMINOPHEN 1 TABLET: 10; 325 TABLET ORAL at 04:20

## 2020-12-16 RX ADMIN — DULOXETINE HYDROCHLORIDE 30 MG: 30 CAPSULE, DELAYED RELEASE ORAL at 08:14

## 2020-12-16 RX ADMIN — GLYCOPYRROLATE AND FORMOTEROL FUMARATE 2 PUFF: 9; 4.8 AEROSOL, METERED RESPIRATORY (INHALATION) at 19:59

## 2020-12-16 RX ADMIN — INSULIN LISPRO 4 UNITS: 100 INJECTION, SOLUTION INTRAVENOUS; SUBCUTANEOUS at 17:05

## 2020-12-16 RX ADMIN — PREGABALIN 25 MG: 25 CAPSULE ORAL at 20:39

## 2020-12-16 RX ADMIN — PANTOPRAZOLE SODIUM 40 MG: 40 TABLET, DELAYED RELEASE ORAL at 14:23

## 2020-12-16 RX ADMIN — HEPARIN SODIUM 5000 UNITS: 5000 INJECTION INTRAVENOUS; SUBCUTANEOUS at 06:01

## 2020-12-16 RX ADMIN — LACTULOSE 20 G: 20 SOLUTION ORAL at 15:14

## 2020-12-16 RX ADMIN — OXYCODONE HYDROCHLORIDE AND ACETAMINOPHEN 1 TABLET: 10; 325 TABLET ORAL at 14:22

## 2020-12-16 RX ADMIN — TIZANIDINE 2 MG: 4 TABLET ORAL at 20:39

## 2020-12-16 RX ADMIN — CARVEDILOL 6.25 MG: 6.25 TABLET, FILM COATED ORAL at 17:04

## 2020-12-16 RX ADMIN — PREGABALIN 25 MG: 25 CAPSULE ORAL at 08:15

## 2020-12-16 RX ADMIN — INSULIN LISPRO 1 UNITS: 100 INJECTION, SOLUTION INTRAVENOUS; SUBCUTANEOUS at 20:41

## 2020-12-16 RX ADMIN — HEPARIN SODIUM 5000 UNITS: 5000 INJECTION INTRAVENOUS; SUBCUTANEOUS at 14:21

## 2020-12-16 RX ADMIN — TIZANIDINE 2 MG: 4 TABLET ORAL at 14:23

## 2020-12-16 RX ADMIN — OXYCODONE HYDROCHLORIDE AND ACETAMINOPHEN 1 TABLET: 10; 325 TABLET ORAL at 20:39

## 2020-12-16 RX ADMIN — CLOPIDOGREL BISULFATE 75 MG: 75 TABLET ORAL at 08:15

## 2020-12-16 RX ADMIN — TIZANIDINE 2 MG: 4 TABLET ORAL at 08:14

## 2020-12-16 RX ADMIN — INSULIN GLARGINE 70 UNITS: 100 INJECTION, SOLUTION SUBCUTANEOUS at 20:40

## 2020-12-16 RX ADMIN — PREGABALIN 25 MG: 25 CAPSULE ORAL at 14:23

## 2020-12-16 RX ADMIN — POLYETHYLENE GLYCOL 3350 17 G: 17 POWDER, FOR SOLUTION ORAL at 08:15

## 2020-12-16 RX ADMIN — PROMETHAZINE HYDROCHLORIDE 12.5 MG: 25 TABLET ORAL at 16:28

## 2020-12-16 RX ADMIN — GABAPENTIN 100 MG: 100 CAPSULE ORAL at 14:23

## 2020-12-16 RX ADMIN — QUETIAPINE FUMARATE 50 MG: 25 TABLET ORAL at 17:04

## 2020-12-16 RX ADMIN — GLYCOPYRROLATE AND FORMOTEROL FUMARATE 2 PUFF: 9; 4.8 AEROSOL, METERED RESPIRATORY (INHALATION) at 07:53

## 2020-12-16 RX ADMIN — GABAPENTIN 100 MG: 100 CAPSULE ORAL at 20:39

## 2020-12-16 RX ADMIN — PRAVASTATIN SODIUM 80 MG: 80 TABLET ORAL at 22:14

## 2020-12-16 RX ADMIN — GABAPENTIN 100 MG: 100 CAPSULE ORAL at 08:14

## 2020-12-16 RX ADMIN — ASPIRIN 81 MG CHEWABLE TABLET 81 MG: 81 TABLET CHEWABLE at 08:15

## 2020-12-16 ASSESSMENT — ENCOUNTER SYMPTOMS
GASTROINTESTINAL NEGATIVE: 1
RESPIRATORY NEGATIVE: 1

## 2020-12-16 ASSESSMENT — PAIN SCALES - GENERAL
PAINLEVEL_OUTOF10: 8
PAINLEVEL_OUTOF10: 9
PAINLEVEL_OUTOF10: 9
PAINLEVEL_OUTOF10: 8
PAINLEVEL_OUTOF10: 9

## 2020-12-16 ASSESSMENT — PAIN DESCRIPTION - PAIN TYPE: TYPE: CHRONIC PAIN

## 2020-12-16 NOTE — PROGRESS NOTES
Occupational Therapy  Facility/Department: [place] [address] REMOTE TELEMETRY  Daily Treatment Note  NAME: Joseluis Juares  : 1970  MRN: [id number]    Date of Service: 2020    Discharge Recommendations:  Subacute/Skilled Nursing Facility     Assessment   Performance deficits / Impairments: Decreased functional mobility ; Decreased ADL status; Decreased sensation;Decreased balance;Decreased strength;Decreased safe awareness;Decreased cognition  Assessment: OT continues to recommend SNF for skilled OT services at d/c. Pt demo's impaired standing balance and safety awareness which increases risk for loss of balance. Cont skilled OT in acute care. Treatment Diagnosis: Weakness  OT Education: Precautions;OT Role;ADL Adaptive Strategies;Transfer Training;Plan of Care  Patient Education: disease specific: importance of assistance for all mobility; safe transfer technique  REQUIRES OT FOLLOW UP: Yes  Activity Tolerance  Activity Tolerance: Patient Tolerated treatment well  Safety Devices  Safety Devices in place: Yes  Type of devices: Call light within reach; Chair alarm in place; Left in chair;Nurse notified;Gait belt       Patient Diagnosis(es): The primary encounter diagnosis was Bilateral leg weakness. Diagnoses of Numbness and tingling of both legs, ESRD (end stage renal disease) on dialysis ([address] and Sinus pause were also pertinent to this visit.       has a past medical history of Ambulatory dysfunction, Aortic stenosis, Arthritis, Asthma, Bilateral hilar adenopathy syndrome, CAD (coronary artery disease), Cardiomyopathy ([address] CHF (congestive heart failure) ([address] Chronic pain, COPD (chronic obstructive pulmonary disease) ([address] Depression, Diabetes mellitus ([address] Difficult intravenous access, Emphysema of lung ([address] ESRD (end stage renal disease) on dialysis ([address] Fear of needles, Gastric ulcer, GERD (gastroesophageal reflux disease), Heart valve problem, Hemodialysis patient ([address] History of spinal fracture, Hx of blood clots, Hyperlipidemia, Hypertension, MI (myocardial infarction) ([address] Neuromuscular disorder ([address] Numbness and tingling in left arm, Pneumonia, PONV (postoperative nausea and vomiting), Prolonged emergence from general anesthesia, Sleep apnea, Stroke ([address] TIA (transient ischemic attack), and Unspecified diseases of blood and blood-forming organs. has a past surgical history that includes Tonsillectomy; cyst removal (08/14/2013); Colonoscopy; Coronary angioplasty with stent (05/26/15); vascular surgery (aprx 2 years ago); Colonoscopy (2/29/2015); Upper gastrointestinal endoscopy (01/06/2016); Upper gastrointestinal endoscopy (01/29/2017); other surgical history (02/01/2017); Dialysis fistula creation (Left, 10/30/2017); Diagnostic Cardiac Cath Lab Procedure; other surgical history (2018); other surgical history (Bilateral, 06/26/2018); pr lap insertion tunneled intraperitoneal catheter (N/A, 9/21/2018); other surgical history (Right, 09/2018); Dialysis fistula creation (Left, 3/27/2019); other surgical history (05/28/2019); Endoscopy, colon, diagnostic; Catheter Removal (Right, 7/2/2019); and Aortic valve replacement (N/A, 10/15/2019). Restrictions  Restrictions/Precautions  Restrictions/Precautions: Fall Risk, Up as Tolerated, General Precautions  Required Braces or Orthoses?: No  Position Activity Restriction  Other position/activity restrictions: Up with assist, telemetry  Subjective   General  Chart Reviewed: Yes  Patient assessed for rehabilitation services?: Yes  Response to previous treatment: Patient with no complaints from previous session  Family / Caregiver Present: No  Referring Practitioner: ROSSY Torres  Diagnosis: BLE weakness  Subjective  Subjective: Pt agreeable to OT  General Comment  Comments: RN approved therapy. Pt seen for co-tx with PT to safely progress mobility and ADLs.   Pain Assessment  Pain Level: 9  Pain Type: Chronic pain  Pain Location: Back  Non-Pharmaceutical Pain Intervention(s): Ambulation/Increased Activity;Repositioned; Therapeutic presence  Response to Pain Intervention: Patient Satisfied   Orientation  Orientation  Overall Orientation Status: Within Functional Limits  Objective    ADL  Feeding: Independent  LE Dressing: Moderate assistance(limited reach to BLE)  Toileting: Minimal assistance(assist for clothing on R side)  Additional Comments: vc's for safety     Balance  Sitting Balance: Supervision  Standing Balance: Minimal assistance(CGA/min x2)  Functional Mobility  Functional - Mobility Device: Rolling Walker  Activity: To/from bathroom  Assist Level: Dependent/Total(CGA x2)  Toilet Transfers  Toilet - Technique: Ambulating(RW)  Equipment Used: Raised toilet seat without rails  Toilet Transfer: Contact guard assistance;2 Person assistance  Toilet Transfers Comments: vc's for safety  Bed mobility  Supine to Sit: Supervision  Sit to Supine: Unable to assess  Transfers  Stand Pivot Transfers: Contact guard assistance;2 Person assistance(to chair with RW)  Sit to stand: Contact guard assistance;Minimal assistance;2 Person assistance(from bed)  Stand to sit: Minimal assistance            Cognition  Arousal/Alertness: Appropriate responses to stimuli  Following Commands:  Follows one step commands consistently  Attention Span: Attends with cues to redirect  Memory: Decreased recall of precautions  Safety Judgement: Decreased awareness of need for safety;Decreased awareness of need for assistance  Problem Solving: Assistance required to identify errors made;Assistance required to implement solutions;Decreased awareness of errors;Assistance required to correct errors made;Assistance required to generate solutions  Insights: Decreased awareness of deficits  Initiation: Requires cues for some  Sequencing: Requires cues for some  Cognition Comment: vc's for safety throughout session, demo's lack of insight into deficits         Type of ROM/Therapeutic Exercise  Comment: seated EOB  Exercises  Shoulder Flexion: 10x  Horizontal ABduction: 10x  Horizontal ADduction: 10x  Elbow Flexion: 10x  Elbow Extension: 10x  Grasp/Release: 10x       Plan   Plan  Times per week: 3-5x  Current Treatment Recommendations: Strengthening, Balance Training, Functional Mobility Training, Endurance Training, Safety Education & Training, Neuromuscular Re-education, Self-Care / ADL, Patient/Caregiver Education & Training  AM-PAC Score      AM-PAC Inpatient Daily Activity Raw Score: 15 (12/16/20 1647)  AM-PAC Inpatient ADL T-Scale Score : 34.69 (12/16/20 1647)  ADL Inpatient CMS 0-100% Score: 56.46 (12/16/20 1647)  ADL Inpatient CMS G-Code Modifier : CK (12/16/20 1647)  Goals  Short term goals  Time Frame for Short term goals: 7 days 12/20/20  Short term goal 1: Pt will complete toilet transfer with MIN A--partially met, CGAx2 12/16  Short term goal 2: Pt will complete LB dressing with MIN A with use of AE as needed---ongoing, max/D 12/16  Short term goal 3: Pt will complete short house hold distacne with RW with MIN A-- ongoing, CGA x2 bathroom mobility with RW 12/16  Patient Goals   Patient goals : Will complete ADLs with independent     Therapy Time   Individual Concurrent Group Co-treatment   Time In 1520         Time Out 1543         Minutes 23         Timed Code Treatment Minutes: 23 Minutes    If pt is discharged prior to next OT session, this note will serve as the discharge summary.   Naida Camargo OT

## 2020-12-16 NOTE — PROGRESS NOTES
[facility]       Progress Note           Subjective/   44y.o. year old male who we are seeing in consultation for ESRD. HD today. No issues with dialysis but having pain and redness around the exit site. He is constipated. Says he cannot go home since he cannot walk. ROS: No fever or chills. Having back pain. Social: No family at bedside. Objective/   GEN:  Chronically ill, /60   Pulse 86   Temp 98 °F (36.7 °C) (Oral)   Resp 18   Ht 5' 9\" (1.753 m)   Wt 263 lb 0.1 oz (119.3 kg)   SpO2 98%   BMI 38.84 kg/m²      HEENT: non-icteric, no JVD  CV: S1, S2 without m/r/g; no LE edema  RESP: CTA B without w/r/r; breathing wnl  ABD: +bs, soft, nt, no hsm  SKIN: warm, no rashes  ACCESS: L [address]    Data/  Recent Labs     12/14/20  0725 12/15/20  1147 12/16/20  0833   WBC 11.1* 11.6* 10.3   HGB 10.3* 9.6* 10.1*   HCT 30.8* 29.7* 31.1*   MCV 88.8 90.4 91.5    280 275     Recent Labs     12/14/20  0725 12/15/20  1147 12/16/20  0833   * 129* 130*   K 4.5 5.0 5.1   CL 91* 95* 92*   CO2 27 24 26   GLUCOSE 88 125* 95   BUN 43* 29* 40*   CREATININE 5.9* 5.5* 6.6*   LABGLOM 10* 11* 9*   GFRAA 12* 13* 11*       Assessment/Plan     ESRD.  - On HD MWF at [facility]. - Vascular access: TDC. Will start doxycycline for exit site infection and transition to IV vancomycin with dialysis ( no IV access ). Constipation:  Magnesium citrate      Hyponatremia. - Likely from fluid in an ESRD patient. - UF with HD. Fluid restriction.     Hypertension.  - BP readings better after stopping Hydralazine.  - On Torsemide, Carvedilol, Diltiazem, Imdur and Losartan.     Anemia.  - Aranesp 25mcg qweekly with HD qWednesday. - Hgb stable      Bilateral LE Weakness. - Appears to be peripheral neuropathy.   Poor chance of improvement

## 2020-12-16 NOTE — CARE COORDINATION
[facility]    Referral received from  to follow for home care services. I will follow for needs, and speak with patient to verify demos.     Zaida Perdue RN, BSN CTN  [facility] [phone]

## 2020-12-16 NOTE — PROGRESS NOTES
sulfate HFA, heparin (porcine), sodium chloride flush, acetaminophen **OR** acetaminophen, promethazine **OR** ondansetron      Intake/Output Summary (Last 24 hours) at 12/15/2020 2019  Last data filed at 12/15/2020 1847  Gross per 24 hour   Intake 840 ml   Output 625 ml   Net 215 ml       Physical Exam Performed:    /62   Pulse 94   Temp 97.9 °F (36.6 °C) (Oral)   Resp 18   Ht 5' 9\" (1.753 m)   Wt 257 lb 4.4 oz (116.7 kg)   SpO2 95%   BMI 37.99 kg/m²     General appearance: No apparent distress, appears stated age and cooperative. HEENT: Pupils equal, round, and reactive to light. Conjunctivae/corneas clear. Neck: Supple, with full range of motion. No jugular venous distention. Trachea midline. Respiratory:  Normal respiratory effort. Clear to auscultation, bilaterally without Rales/Wheezes/Rhonchi. Cardiovascular: Regular rate and rhythm with normal S1/S2 without murmurs, rubs or gallops. Abdomen: Soft, non-tender, non-distended with normal bowel sounds. Musculoskeletal: No clubbing, cyanosis or edema bilaterally. Full range of motion without deformity. Skin: Skin color, texture, turgor normal.  No rashes or lesions. Neurologic:  Abnormal sensation below the knee bilaterally. 3/5 weakness BLE. Psychiatric: Alert and oriented, thought content appropriate, normal insight  Capillary Refill: Brisk,< 3 seconds   Peripheral Pulses: +2 palpable, equal bilaterally       Labs:   Recent Labs     12/13/20  1134 12/14/20  0725 12/15/20  1147   WBC 10.4 11.1* 11.6*   HGB 10.0* 10.3* 9.6*   HCT 30.2* 30.8* 29.7*    314 280     Recent Labs     12/13/20  1134 12/14/20  0725 12/15/20  1147   * 127* 129*   K 4.6 4.5 5.0   CL 88* 91* 95*   CO2 25 27 24   BUN 36* 43* 29*   CREATININE 5.6* 5.9* 5.5*   CALCIUM 9.0 9.1 9.3     No results for input(s): AST, ALT, BILIDIR, BILITOT, ALKPHOS in the last 72 hours. No results for input(s): INR in the last 72 hours.   No results for input(s): CKTOTAL, TROPONINI in the last 72 hours. Urinalysis:      Lab Results   Component Value Date    NITRU Negative 06/05/2020    WBCUA 10-20 06/05/2020    BACTERIA 2+ 06/05/2020    RBCUA 0-2 06/05/2020    BLOODU TRACE-INTACT 06/05/2020    SPECGRAV 1.020 06/05/2020    GLUCOSEU 250 06/05/2020       Radiology:  IR LUMBAR PUNCTURE FOR DIAGNOSIS   Final Result   Successful fluoroscopic-guided lumbar puncture. CT ABDOMEN PELVIS W IV CONTRAST Additional Contrast? Radiologist Recommendation   Final Result   No acute abdominopelvic abnormality. MRI THORACIC SPINE WO CONTRAST   Final Result   No acute thoracic spine abnormality. No thoracic spinal canal or neural   foraminal stenosis. MRI CERVICAL SPINE WO CONTRAST   Final Result   No acute cervical spine abnormality. Mild degenerative spondylosis without significant cervical spinal canal   stenosis or high-grade neural foraminal stenosis. Mild left C4-C5 and right C6-C7 neural foraminal stenosis. MRI LUMBAR SPINE WO CONTRAST   Final Result   1. Mild L1-2 and L5-S1 degenerative disc height loss. 2. Moderate right L5 neural foraminal narrowing secondary to a right   foraminal L5-S1 disc protrusion. 3. Mild L1-2 spinal canal stenosis. CT ABDOMEN PELVIS WO CONTRAST   Final Result   1. No CT evidence of an acute intra-abdominal or intrapelvic process. 2.  No findings to suggest acute appendicitis; no ureter calculus or   hydronephrosis. 3. Probably reactive lymph nodes in the right upper quadrant, unchanged from   prior study. 4.  Mild extrahepatic bile duct dilatation status post cholecystectomy   typical of reservoir effect. 5.  Calcific atherosclerotic disease aorta. 6. Small, fat containing umbilical hernia. 7. Benign left adrenal adenoma requires no additional evaluation or follow-up.                  Assessment/Plan:    Active Hospital Problems    Diagnosis    S/P TAVR (transcatheter aortic valve replacement) [Z95.2] Priority: Medium    Sinus pause [I45.5]    GBS (Guillain[place] syndrome) ([facility]) [G61.0]    Bilateral leg weakness [R29.898]    ESRD (end stage renal disease) on dialysis ([facility] [N18.6, Z99.2]    DM (diabetes mellitus), secondary, uncontrolled, w/neurologic complic ([facility]) [P71.93, Z78.29]    Nonischemic cardiomyopathy ([facility] [I42.8]     Bilateral lower extremity weakness numbness - unclear etiology. Exam seems inconsistent.  - MRI shows L5-S1 disc protrusion.  - discussed with Ortho Spine - recommended MRI cervical and thoracic spine to rule out epidural abscess, given elevated ESR and CRP. This was WNL  - neurology consulted and now signed off.  - s/p LP 12/11/2020, which was unrevealing.     ESRD on HD   - nephrology consulted and following.  - MWF HD     Chronic back pain   - continue home medication and muscle relaxant  - increase to Percocet from 7.5 to 10mg/325.   - follows with pain management as outpatient.     DM2 with neuropathy  - continue Lantus and medium dose SSI. - continue gabapentin and Cymbalta. Hyponatremia   - nephrology following.  - hold Celexa.     Anemia - likely due to ESRD.     Chronic diastolic CHF - no evidence of fluid overload     COPD - stable. Continue home inhalers     CAD -  Continue Pravachol and BB. Continue to hold ASA and Plavix overnight in case of need for pacer. Bradycardia with pauses  - cardiology consulted and following.  - discontinue diltiazem. Resume Coreg at 6.25 mg.    - EP consulted for possible pacer placement    DVT Prophylaxis: Subcutaneous Heparin  Diet: DIET RENAL; Daily Fluid Restriction: 1000 ml  Dietary Nutrition Supplements: Diabetic Oral Supplement  Code Status: Full Code    PT/OT Eval Status: recommend ARU, but insurance denied.   Planning for [facility]    Dispo - hopefully tomorrow    JAY Rangel - CNP

## 2020-12-16 NOTE — CARE COORDINATION
Writer spoke with pt and Kristin NP at bedside, pt cannot walk and wants to go to [facility] now, will plan for discharge tomorrow. Writer spoke with [facility], they can accept pt as long as HD transportation has been arranged. Writer called [facility] , he told writer to contact [employer] directly because it is showing that pt is not eligible for [facility] to cover HD transport.   REINALDO Escamilla-RN

## 2020-12-16 NOTE — PROGRESS NOTES
Physical Therapy  Facility/Department: [place] [address] REMOTE TELEMETRY  Daily Treatment Note  NAME: Grover Worley  : 1970  MRN: [id number]    Date of Service: 2020    Discharge Recommendations:  Continue to assess pending progress   PT Equipment Recommendations  Equipment Needed: Yes  Mobility Devices: Santos Pedrot if pt d/c is to home  Walker: Rolling      Assessment   Body structures, Functions, Activity limitations: Decreased functional mobility ; Decreased strength;Decreased balance;Decreased sensation;Decreased endurance;Decreased ADL status; Decreased posture;Decreased cognition;Decreased safe awareness;Decreased high-level IADLs; Increased pain  Assessment: Patient is making  progress with his therapy goals. Patient today was able to perform transfers with cga/minx2 and ambulate 25' twice cga/min A. Patient continues to present with the therapy deficits listed above. Continue to assess per pt progressing. PT to continue per POC. Treatment Diagnosis: Impaired sensation and mobility, weakness  Prognosis: Fair  Decision Making: Medium Complexity  Patient Education: OOB activities  Barriers to Learning: none  REQUIRES PT FOLLOW UP: Yes  Activity Tolerance  Activity Tolerance: Patient Tolerated treatment well;Patient limited by endurance; Patient limited by pain     Patient Diagnosis(es): The primary encounter diagnosis was Bilateral leg weakness. Diagnoses of Numbness and tingling of both legs and ESRD (end stage renal disease) on dialysis [facility]) were also pertinent to this visit.      has a past medical history of Ambulatory dysfunction, Aortic stenosis, Arthritis, Asthma, Bilateral hilar adenopathy syndrome, CAD (coronary artery disease), Cardiomyopathy ([address] CHF (congestive heart failure) ([address] Chronic pain, COPD (chronic obstructive pulmonary disease) ([address] Depression, Diabetes mellitus ([address] Difficult intravenous access, Emphysema of lung ([address] ESRD (end stage renal disease) on dialysis ([address] Fear of needles, Gastric ulcer, GERD (gastroesophageal reflux disease), Heart valve problem, Hemodialysis patient ([address] History of spinal fracture, Hx of blood clots, Hyperlipidemia, Hypertension, MI (myocardial infarction) ([facility] Neuromuscular disorder ([facility] Numbness and tingling in left arm, Pneumonia, PONV (postoperative nausea and vomiting), Prolonged emergence from general anesthesia, Sleep apnea, Stroke ([facility] TIA (transient ischemic attack), and Unspecified diseases of blood and blood-forming organs. has a past surgical history that includes Tonsillectomy; cyst removal (08/14/2013); Colonoscopy; Coronary angioplasty with stent (05/26/15); vascular surgery (aprx 2 years ago); Colonoscopy (2/29/2015); Upper gastrointestinal endoscopy (01/06/2016); Upper gastrointestinal endoscopy (01/29/2017); other surgical history (02/01/2017); Dialysis fistula creation (Left, 10/30/2017); Diagnostic Cardiac Cath Lab Procedure; other surgical history (2018); other surgical history (Bilateral, 06/26/2018); pr lap insertion tunneled intraperitoneal catheter (N/A, 9/21/2018); other surgical history (Right, 09/2018); Dialysis fistula creation (Left, 3/27/2019); other surgical history (05/28/2019); Endoscopy, colon, diagnostic; Catheter Removal (Right, 7/2/2019); and Aortic valve replacement (N/A, 10/15/2019). Restrictions  Restrictions/Precautions  Restrictions/Precautions: Fall Risk, Up as Tolerated, General Precautions  Required Braces or Orthoses?: No  Position Activity Restriction  Other position/activity restrictions: Up with assist, telemetry  Subjective   General  Chart Reviewed: Yes  Response To Previous Treatment: Patient with no complaints from previous session.   Family / Caregiver Present: No  Referring Practitioner: JAY Alvarez - CNP  Subjective  Subjective: PT AGREEABLE TO pt  General Comment  Comments: Patient seated in chair at approach  Pain Screening  Patient Currently in Pain: Yes  Pain Assessment  Pain Assessment: 0-10  Pain Level: 9  Non-Pharmaceutical Pain Intervention(s): Ambulation/Increased Activity;Repositioned  Vital Signs  Patient Currently in Pain: Yes       Orientation  Orientation  Overall Orientation Status: Within Functional Limits  Cognition      Objective      Transfers  Sit to Stand: 2 Person Assistance;Minimal Assistance;Contact guard assistance  Stand to sit: Contact guard assistance;Minimal Assistance  Ambulation  Ambulation?: Yes  Ambulation 1  Surface: level tile  Device: Rolling Walker  Assistance: Contact guard assistance;Minimal assistance  Quality of Gait: inconsistent pattern; occasional assist to direct rw  Gait Deviations: Slow Ольга;Decreased step length;Shuffles  Distance: 25' x 2     Balance  Posture: Fair  Sitting - Static: Good  Sitting - Dynamic: Good  Standing - Static: Fair  Standing - Dynamic: Fair  Comments: with RW  Exercises  Quad Sets: 12 x B  Hip Flexion: 1 x 15  Knee Long Arc Quad: 1 x 10  Knee Passive Range of Motion: Hip IR/ER 12 x B assisted for full ROM  Ankle Pumps: 2 x 10  Comments: reviewed AP, LAQ, marching, hip abd. Pt reports doing LE on his own frequently, but states his legs are too sore to do any more this session. Comment: toilet transf min A. Standing balance at sink cga            AM-PAC Score     AM-PAC Inpatient Mobility without Stair Climbing Raw Score : 16 (12/16/20 1617)  AM-PAC Inpatient without Stair Climbing T-Scale Score : 45.54 (12/16/20 1617)  Mobility Inpatient CMS 0-100% Score: 40.64 (12/16/20 1617)  Mobility Inpatient without Stair CMS G-Code Modifier : CK (12/16/20 1617)       Goals  Short term goals  Time Frame for Short term goals: 1 week, 12/15/20 unless otherwise noted.   Short term goal 1: Pt will perform bed mobility with independence; 12/13 Supervision  Short term goal 2: Pt will perform sit<>stand transfer with min A; 12/9 HERIBERTO plus, 12/13 max assist x 2 with RW  Short term goal 3: Pt will perform bed<>chair transfer with LRAD and min A; 12/9 HERIBERTO plus for all mobility, 12/13 max assist x 2 with RW  Short term goal 4: Pt will ambulate 20ft with LRAD and min A; 12/9 HERIBERTO plus with A of 2, 12/13 3 feet max assist x 2 with RW  Short term goal 5: By 12/11 pt will tolerate x 10-15 reps BLE exercise to assist with functional transfers and ambulation. ; 12/13 progressing  Patient Goals   Patient goals : \"To feel my legs again. \"    Plan    Plan  Times per week: 3-5x/wk  Times per day: Daily  Current Treatment Recommendations: Strengthening, Balance Training, Functional Mobility Training, Gait Training, Endurance Training, Safety Education & Training, Patient/Caregiver Education & Training  Safety Devices  Type of devices:  All fall risk precautions in place, Patient at risk for falls, Nurse notified, Call light within reach, Left in chair, Gait belt, Chair alarm in place  Restraints  Initially in place: No     Therapy Time   Individual Concurrent Group Co-treatment   Time In 1516         Time Out 1540         Minutes 24         Timed Code Treatment Minutes: [address]

## 2020-12-16 NOTE — PROGRESS NOTES
[facility]  Cardiology  Progress Note    Admission date:  2020    Reason for follow up visit: Bradycardia    HPI/CC: Candace Garcia is a 44 y.o. male who was admitted 2020 for lower extremity weakness. Cardiology consulted 2020 for sinus bradycardia/pauses. Diltiazem stopped. Rhythm has been sinus 70s. Subjective: Denies chest pain, shortness of breath, palpitations and dizziness. Vitals:  Blood pressure 104/60, pulse 86, temperature 98 °F (36.7 °C), temperature source Oral, resp. rate 18, height 5' 9\" (1.753 m), weight 263 lb 0.1 oz (119.3 kg), SpO2 98 %.   Temp  Av.8 °F (36.6 °C)  Min: 97.4 °F (36.3 °C)  Max: 98.3 °F (36.8 °C)  Pulse  Av  Min: 75  Max: 94  BP  Min: 104/60  Max: 152/76  SpO2  Av.7 %  Min: 93 %  Max: 99 %  FiO2   Av %  Min: 30 %  Max: 30 %    24 hour I/O    Intake/Output Summary (Last 24 hours) at 2020 1514  Last data filed at 2020 1012  Gross per 24 hour   Intake 960 ml   Output 300 ml   Net 660 ml     Current Facility-Administered Medications   Medication Dose Route Frequency Provider Last Rate Last Admin    lactulose (CHRONULAC) 10 GM/15ML solution 20 g  20 g Oral Once Aram Distance, APRN - CNP        magnesium citrate solution 296 mL  296 mL Oral Once Wendy Olivas MD        doxycycline hyclate (VIBRA-TABS) tablet 100 mg  100 mg Oral 2 times per day Wendy Olivas MD        polyethylene glycol [facility]) packet 17 g  17 g Oral BID Aram Distance, APRN - CNP   17 g at 20 0815    pantoprazole (PROTONIX) tablet 40 mg  40 mg Oral BID AC Aram Distance, APRN - CNP   40 mg at 20 1423    carvedilol (COREG) tablet 6.25 mg  6.25 mg Oral BID WC Aram Distance, APRN - CNP   6.25 mg at 12/15/20 1848    darbepoetin siddharth-polysorbate (ARANESP) injection 25 mcg  25 mcg Intravenous Q7 Days Laurel Chong MD   25 mcg at 20 1224    prochlorperazine (COMPAZINE) injection 5 mg  5 mg Intravenous Q6H PRN Kisha SLAUGHTER JAY Silverman - NP   5 mg at 12/13/20 1317    oxyCODONE-acetaminophen (PERCOCET)  MG per tablet 1 tablet  1 tablet Oral N7O PRN Dayanara CisnerosJAY luke - CNP   1 tablet at 12/16/20 1422    calcium carbonate (TUMS) chewable tablet 500 mg  500 mg Oral TID PRN JAY Fofana CNP   500 mg at 12/14/20 1003    albuterol sulfate  (90 Base) MCG/ACT inhaler 2 puff  2 puff Inhalation Q6H PRN Denny Kirkland MD   2 puff at 12/06/20 1902    heparin (porcine) injection 4,000 Units  4,000 Units Intracatheter PRN Ashley Birch MD   4,000 Units at 12/09/20 1224    lidocaine 4 % external patch 1 patch  1 patch Transdermal Daily JAY Fofana CNP   1 patch at 12/15/20 0818    insulin lispro (HUMALOG) injection vial 0-12 Units  0-12 Units Subcutaneous TID WC Duwaine PencilJAY CNP   2 Units at 12/15/20 1841    insulin lispro (HUMALOG) injection vial 0-6 Units  0-6 Units Subcutaneous Nightly Duwaine PencilJAY - CNP   0 Units at 12/14/20 2121    aspirin chewable tablet 81 mg  81 mg Oral Daily Denny Kirkland MD   81 mg at 12/16/20 0815    clopidogrel (PLAVIX) tablet 75 mg  75 mg Oral Daily Denny Kirkland MD   75 mg at 12/16/20 0815    DULoxetine (CYMBALTA) extended release capsule 30 mg  30 mg Oral Daily Denny Kirkland MD   30 mg at 12/16/20 0814    gabapentin (NEURONTIN) capsule 100 mg  100 mg Oral TID Denny Kirkland MD   100 mg at 12/16/20 1423    insulin glargine (LANTUS) injection vial 70 Units  70 Units Subcutaneous Nightly Denny Kirkland MD   70 Units at 12/15/20 2149    isosorbide mononitrate (IMDUR) extended release tablet 30 mg  30 mg Oral Daily Denny Kirkland MD   30 mg at 12/15/20 0818    linaclotide (LINZESS) capsule 145 mcg  145 mcg Oral QAM AC Denny Kirkland MD        losartan (COZAAR) tablet 50 mg  50 mg Oral Daily Denny Kirkland MD   50 mg at 12/15/20 0818    pravastatin (PRAVACHOL) tablet 80 mg  80 mg Oral Nightly Denny Kirkland MD   80 mg at 12/15/20 2156    pregabalin (LYRICA) capsule 25 mg  25 mg Oral TID Ruddy Walker MD   25 mg at 12/16/20 1423    QUEtiapine (SEROQUEL) tablet 50 mg  50 mg Oral QPM Ruddy Walker MD   50 mg at 12/15/20 1842    glycopyrrolate-formoterol (BEVESPI) 9-4.8 MCG/ACT inhaler 2 puff  2 puff Inhalation BID Ruddy Walker MD   2 puff at 12/16/20 0753    tiZANidine (ZANAFLEX) tablet 2 mg  2 mg Oral TID Ruddy Walker MD   2 mg at 12/16/20 1423    torsemide (DEMADEX) tablet 100 mg  100 mg Oral Daily Ruddy Walker MD   100 mg at 12/15/20 0818    traZODone (DESYREL) tablet 150 mg  150 mg Oral Nightly Ruddy Walker MD   150 mg at 12/15/20 2148    sodium chloride flush 0.9 % injection 10 mL  10 mL Intravenous 2 times per day Ruddy Walker MD   10 mL at 12/15/20 2156    sodium chloride flush 0.9 % injection 10 mL  10 mL Intravenous PRN Ruddy Walker MD        acetaminophen (TYLENOL) tablet 650 mg  650 mg Oral Q6H PRN Ruddy Walker MD        Or    acetaminophen (TYLENOL) suppository 650 mg  650 mg Rectal Q6H PRN Ruddy Walker MD        promethazine (PHENERGAN) tablet 12.5 mg  12.5 mg Oral Q6H PRN Ruddy Walker MD   12.5 mg at 12/07/20 1653    Or    ondansetron (ZOFRAN) injection 4 mg  4 mg Intravenous Q6H PRN Ruddy Walker MD   4 mg at 12/14/20 1954    heparin (porcine) injection 5,000 Units  5,000 Units Subcutaneous 3 times per day Ruddy Walker MD   5,000 Units at 12/16/20 1421     Review of Systems   Constitutional: Negative. Respiratory: Negative. Cardiovascular: Negative. Gastrointestinal: Negative. Neurological: Negative.       Objective:     Telemetry monitor: SR 70s    Physical Exam:  Constitutional:  Comfortable and alert, NAD, appears older than stated age  Eyes: PERRL, sclera nonicteric  Neck:  Supple, no masses, no thyroidmegaly, no JVD  Skin:  Warm and dry; no rash or lesions  Heart: Regular, normal apex, S1 and S2 normal, +murmur  Lungs:  Normal respiratory effort; clear; no wheezing/rhonchi/rales  Abdomen: soft, non tender, + bowel sounds  Extremities:  No edema or cyanosis; no clubbing  Neuro: alert and oriented, moves legs and arms equally, normal mood and affect    Data Reviewed: ABIs 6/8/2020:  DESTINEY's indicate mild bilateral lower extremity arterial disease.     <50% stenoses in the bilateral common, superficial femoral and popliteal     arteries.  Tibial vessels not well visualized.       Echo 7/9/2020:  Low Normal systolic function with an estimated ejection fraction of 50%.   Left ventricular function and wall motion is difficult to estimate due to   poor endocardial visualization.   Grade I diastolic dysfunction with normal filing pressure.   Normally functioning TAVR 29 mm Langford Leyda S3 bioprosthetic valve in   aortic position appears well seated with a max velocity of 2.11 m/sec,   maximum gradient of 18 mmHg and a mean gradient of 8 mmHg.   There is no evidence of aortic regurgitation.     Coronary angiogram 8/22/2019:  LM <20%  LAD patent stent  Cx <20%  RCA 30%  Severe AS by echo  Proceed w/ TAVR    Lab Reviewed:     Renal Profile:  Lab Results   Component Value Date    CREATININE 6.6 12/16/2020    BUN 40 12/16/2020     12/16/2020    K 5.1 12/16/2020    CL 92 12/16/2020    CO2 26 12/16/2020     CBC:    Lab Results   Component Value Date    WBC 10.3 12/16/2020    RBC 3.41 12/16/2020    HGB 10.1 12/16/2020    HCT 31.1 12/16/2020    MCV 91.5 12/16/2020    RDW 13.8 12/16/2020     12/16/2020     BNP:    Lab Results   Component Value Date    PROBNP 3,866 04/10/2020    PROBNP 2,654 11/06/2019    PROBNP 4,929 10/31/2019    PROBNP 10,514 10/18/2019    PROBNP 7,601 10/15/2019     Fasting Lipid Panel:    Lab Results   Component Value Date    CHOL 170 08/22/2019    HDL 49 08/22/2019    TRIG 231 08/22/2019     Cardiac Enzymes:  CK/MbTroponin  Lab Results   Component Value Date    CKTOTAL 167 06/05/2020    TROPONINI 0.04 12/07/2020     PT/

## 2020-12-16 NOTE — CARE COORDINATION
[facility] is not accepting patients today or tomorrow because of new Covid cases. Pt currently in HD, but will need to discuss next SNF choice. Pt is medically stable for discharge and needs to leave today. Writer called [facility] to confirm they could still take him today, yesterday there was concern about not getting HD transport setup, they called his wife to see if she could transport for at least Friday's HD. Writer will discuss with pt, and [facility].   REINALDO Brunner-NAVJOT

## 2020-12-16 NOTE — PROGRESS NOTES
Treatment time: 210    Net UF: 3000    Pre weight: 119.3  Post weight: 116.2  EDW: 117    Access used: L CVC  Access function: well    Medications or blood products given: none    Regular outpatient schedule: MWF    Summary of response to treatment: tolerated well no issues    Copy of dialysis treatment record placed in chart, to be scanned into EMR.

## 2020-12-16 NOTE — PROGRESS NOTES
12/16/20 0332   NIV Type   $NIV $Daily Charge   NIV Started/Stopped On   Equipment Type v60   Mode CPAP   Mask Type Full face mask   Mask Size Medium   Settings/Measurements   CPAP/EPAP 10 cmH2O   Resp 15   FiO2  30 %   Vt Exhaled 390 mL   Mask Leak (lpm) 20 lpm   Comfort Level Good   Using Accessory Muscles No   Alarm Settings   Alarms On Y

## 2020-12-16 NOTE — CARE COORDINATION
Writer met with pt, he does not want to go to any other SNF and agrees with returning home with home care and HD transport through insurance as long as he poops first.  Writer sent perfect serve to [facility]. Writer left voicemail for Maru Larson dual  Bella Arnold. Pt states he has a rollator and walker at home, thinks he'll be ok. Pt had [facility] in the past, writer made referral to [facility] liaison.   Ursula Boast, ACM-RN

## 2020-12-17 LAB
ANION GAP SERPL CALCULATED.3IONS-SCNC: 10 MMOL/L (ref 3–16)
BASOPHILS ABSOLUTE: 0.1 K/UL (ref 0–0.2)
BASOPHILS RELATIVE PERCENT: 0.9 %
BUN BLDV-MCNC: 33 MG/DL (ref 7–20)
CALCIUM SERPL-MCNC: 9.5 MG/DL (ref 8.3–10.6)
CHLORIDE BLD-SCNC: 93 MMOL/L (ref 99–110)
CO2: 25 MMOL/L (ref 21–32)
CREAT SERPL-MCNC: 5 MG/DL (ref 0.9–1.3)
EOSINOPHILS ABSOLUTE: 0.3 K/UL (ref 0–0.6)
EOSINOPHILS RELATIVE PERCENT: 3.2 %
GFR AFRICAN AMERICAN: 15
GFR NON-AFRICAN AMERICAN: 12
GLUCOSE BLD-MCNC: 150 MG/DL (ref 70–99)
GLUCOSE BLD-MCNC: 154 MG/DL (ref 70–99)
GLUCOSE BLD-MCNC: 159 MG/DL (ref 70–99)
GLUCOSE BLD-MCNC: 193 MG/DL (ref 70–99)
GLUCOSE BLD-MCNC: 303 MG/DL (ref 70–99)
HCT VFR BLD CALC: 29.8 % (ref 40.5–52.5)
HEMOGLOBIN: 9.6 G/DL (ref 13.5–17.5)
LYMPHOCYTES ABSOLUTE: 1.1 K/UL (ref 1–5.1)
LYMPHOCYTES RELATIVE PERCENT: 11.8 %
MCH RBC QN AUTO: 29.4 PG (ref 26–34)
MCHC RBC AUTO-ENTMCNC: 32.2 G/DL (ref 31–36)
MCV RBC AUTO: 91.2 FL (ref 80–100)
MONOCYTES ABSOLUTE: 0.9 K/UL (ref 0–1.3)
MONOCYTES RELATIVE PERCENT: 8.9 %
NEUTROPHILS ABSOLUTE: 7.3 K/UL (ref 1.7–7.7)
NEUTROPHILS RELATIVE PERCENT: 75.2 %
PDW BLD-RTO: 13.6 % (ref 12.4–15.4)
PERFORMED ON: ABNORMAL
PLATELET # BLD: 260 K/UL (ref 135–450)
PMV BLD AUTO: 7.2 FL (ref 5–10.5)
POTASSIUM REFLEX MAGNESIUM: 5.2 MMOL/L (ref 3.5–5.1)
RBC # BLD: 3.26 M/UL (ref 4.2–5.9)
SODIUM BLD-SCNC: 128 MMOL/L (ref 136–145)
WBC # BLD: 9.8 K/UL (ref 4–11)

## 2020-12-17 PROCEDURE — 6370000000 HC RX 637 (ALT 250 FOR IP): Performed by: INTERNAL MEDICINE

## 2020-12-17 PROCEDURE — 97110 THERAPEUTIC EXERCISES: CPT

## 2020-12-17 PROCEDURE — 2700000000 HC OXYGEN THERAPY PER DAY

## 2020-12-17 PROCEDURE — 97530 THERAPEUTIC ACTIVITIES: CPT

## 2020-12-17 PROCEDURE — 97116 GAIT TRAINING THERAPY: CPT

## 2020-12-17 PROCEDURE — 94640 AIRWAY INHALATION TREATMENT: CPT

## 2020-12-17 PROCEDURE — 6370000000 HC RX 637 (ALT 250 FOR IP): Performed by: NURSE PRACTITIONER

## 2020-12-17 PROCEDURE — 36415 COLL VENOUS BLD VENIPUNCTURE: CPT

## 2020-12-17 PROCEDURE — 94660 CPAP INITIATION&MGMT: CPT

## 2020-12-17 PROCEDURE — 80048 BASIC METABOLIC PNL TOTAL CA: CPT

## 2020-12-17 PROCEDURE — 1200000000 HC SEMI PRIVATE

## 2020-12-17 PROCEDURE — 85025 COMPLETE CBC W/AUTO DIFF WBC: CPT

## 2020-12-17 PROCEDURE — 6360000002 HC RX W HCPCS: Performed by: INTERNAL MEDICINE

## 2020-12-17 RX ORDER — SIMETHICONE 80 MG
80 TABLET,CHEWABLE ORAL EVERY 6 HOURS PRN
Status: DISCONTINUED | OUTPATIENT
Start: 2020-12-17 | End: 2020-12-21 | Stop reason: HOSPADM

## 2020-12-17 RX ORDER — DOCUSATE SODIUM 100 MG/1
100 CAPSULE, LIQUID FILLED ORAL DAILY
Status: DISCONTINUED | OUTPATIENT
Start: 2020-12-17 | End: 2020-12-21 | Stop reason: HOSPADM

## 2020-12-17 RX ORDER — LACTULOSE 10 G/15ML
20 SOLUTION ORAL
Status: DISCONTINUED | OUTPATIENT
Start: 2020-12-17 | End: 2020-12-21 | Stop reason: HOSPADM

## 2020-12-17 RX ADMIN — INSULIN LISPRO 2 UNITS: 100 INJECTION, SOLUTION INTRAVENOUS; SUBCUTANEOUS at 18:00

## 2020-12-17 RX ADMIN — ISOSORBIDE MONONITRATE 30 MG: 30 TABLET, EXTENDED RELEASE ORAL at 09:02

## 2020-12-17 RX ADMIN — TORSEMIDE 100 MG: 100 TABLET ORAL at 09:02

## 2020-12-17 RX ADMIN — GLYCOPYRROLATE AND FORMOTEROL FUMARATE 2 PUFF: 9; 4.8 AEROSOL, METERED RESPIRATORY (INHALATION) at 09:04

## 2020-12-17 RX ADMIN — DOCUSATE SODIUM 100 MG: 100 CAPSULE, LIQUID FILLED ORAL at 15:04

## 2020-12-17 RX ADMIN — GABAPENTIN 100 MG: 100 CAPSULE ORAL at 09:02

## 2020-12-17 RX ADMIN — INSULIN LISPRO 1 UNITS: 100 INJECTION, SOLUTION INTRAVENOUS; SUBCUTANEOUS at 21:03

## 2020-12-17 RX ADMIN — INSULIN LISPRO 1 UNITS: 100 INJECTION, SOLUTION INTRAVENOUS; SUBCUTANEOUS at 08:54

## 2020-12-17 RX ADMIN — CARVEDILOL 6.25 MG: 6.25 TABLET, FILM COATED ORAL at 18:00

## 2020-12-17 RX ADMIN — TIZANIDINE 2 MG: 4 TABLET ORAL at 21:03

## 2020-12-17 RX ADMIN — PANTOPRAZOLE SODIUM 40 MG: 40 TABLET, DELAYED RELEASE ORAL at 06:15

## 2020-12-17 RX ADMIN — DOXYCYCLINE HYCLATE 100 MG: 100 TABLET, COATED ORAL at 09:02

## 2020-12-17 RX ADMIN — LOSARTAN POTASSIUM 50 MG: 25 TABLET, FILM COATED ORAL at 09:02

## 2020-12-17 RX ADMIN — ASPIRIN 81 MG CHEWABLE TABLET 81 MG: 81 TABLET CHEWABLE at 09:02

## 2020-12-17 RX ADMIN — GABAPENTIN 100 MG: 100 CAPSULE ORAL at 21:03

## 2020-12-17 RX ADMIN — QUETIAPINE FUMARATE 50 MG: 25 TABLET ORAL at 18:00

## 2020-12-17 RX ADMIN — LACTULOSE 20 G: 20 SOLUTION ORAL at 15:04

## 2020-12-17 RX ADMIN — OXYCODONE HYDROCHLORIDE AND ACETAMINOPHEN 1 TABLET: 10; 325 TABLET ORAL at 21:03

## 2020-12-17 RX ADMIN — POLYETHYLENE GLYCOL 3350 17 G: 17 POWDER, FOR SOLUTION ORAL at 09:02

## 2020-12-17 RX ADMIN — SIMETHICONE 80 MG: 80 TABLET, CHEWABLE ORAL at 20:09

## 2020-12-17 RX ADMIN — OXYCODONE HYDROCHLORIDE AND ACETAMINOPHEN 1 TABLET: 10; 325 TABLET ORAL at 15:03

## 2020-12-17 RX ADMIN — TRAZODONE HYDROCHLORIDE 150 MG: 50 TABLET ORAL at 21:03

## 2020-12-17 RX ADMIN — LACTULOSE 20 G: 20 SOLUTION ORAL at 09:43

## 2020-12-17 RX ADMIN — PREGABALIN 25 MG: 25 CAPSULE ORAL at 21:03

## 2020-12-17 RX ADMIN — PANTOPRAZOLE SODIUM 40 MG: 40 TABLET, DELAYED RELEASE ORAL at 15:03

## 2020-12-17 RX ADMIN — CARVEDILOL 6.25 MG: 6.25 TABLET, FILM COATED ORAL at 09:02

## 2020-12-17 RX ADMIN — CLOPIDOGREL BISULFATE 75 MG: 75 TABLET ORAL at 09:02

## 2020-12-17 RX ADMIN — HEPARIN SODIUM 5000 UNITS: 5000 INJECTION INTRAVENOUS; SUBCUTANEOUS at 06:15

## 2020-12-17 RX ADMIN — INSULIN GLARGINE 70 UNITS: 100 INJECTION, SOLUTION SUBCUTANEOUS at 21:04

## 2020-12-17 RX ADMIN — MAGNESIUM CITRATE 296 ML: 1.75 LIQUID ORAL at 16:02

## 2020-12-17 RX ADMIN — DULOXETINE HYDROCHLORIDE 30 MG: 30 CAPSULE, DELAYED RELEASE ORAL at 09:02

## 2020-12-17 RX ADMIN — PREGABALIN 25 MG: 25 CAPSULE ORAL at 12:58

## 2020-12-17 RX ADMIN — LACTULOSE 20 G: 20 SOLUTION ORAL at 12:58

## 2020-12-17 RX ADMIN — PRAVASTATIN SODIUM 80 MG: 80 TABLET ORAL at 21:18

## 2020-12-17 RX ADMIN — OXYCODONE HYDROCHLORIDE AND ACETAMINOPHEN 1 TABLET: 10; 325 TABLET ORAL at 09:02

## 2020-12-17 RX ADMIN — TIZANIDINE 2 MG: 4 TABLET ORAL at 12:58

## 2020-12-17 RX ADMIN — TIZANIDINE 2 MG: 4 TABLET ORAL at 09:01

## 2020-12-17 RX ADMIN — INSULIN LISPRO 8 UNITS: 100 INJECTION, SOLUTION INTRAVENOUS; SUBCUTANEOUS at 12:55

## 2020-12-17 RX ADMIN — GABAPENTIN 100 MG: 100 CAPSULE ORAL at 12:58

## 2020-12-17 RX ADMIN — PREGABALIN 25 MG: 25 CAPSULE ORAL at 09:02

## 2020-12-17 RX ADMIN — HEPARIN SODIUM 5000 UNITS: 5000 INJECTION INTRAVENOUS; SUBCUTANEOUS at 21:04

## 2020-12-17 ASSESSMENT — PAIN SCALES - GENERAL: PAINLEVEL_OUTOF10: 10

## 2020-12-17 NOTE — PROGRESS NOTES
Report given to [facility] SHEA. Call light and bedside table within reach. No needs voiced at this time. Bed in lowest position, brakes locked. Bed alarm on and in place.

## 2020-12-17 NOTE — CARE COORDINATION
12/17/20 There is an option to appeal the ARU denial. I called [phone] to try to appeal the denial. I was not able to reach the UR department. I received a call from pt's Gerhardt Angst CM that stated the phone lines were down and they were also having issues with email. I'm waiting for a return phone call from pt's ETHAN to discuss appeal options and transportation issues.

## 2020-12-17 NOTE — CARE COORDINATION
12/17/20 Pt's insurance will not cover transport to and from HD. Pt will be eligible for that benefit Jan. 1st. The pt does not have transport for HD and no SNF can provide that currently. Pt does not want to go to [facility]. Pt wants to appeal denial to ARU.  Call placed to ARU to explore options

## 2020-12-17 NOTE — CARE COORDINATION
12/17/20 Pt does not feel like he can go home but does not have transport to and from HD to go to a SNF. Pt does not have transport benefit under Medicaid until Jan 1st.  I tried to call Frieda to set up p2p for ARU denial. The phone lines are down [phone]. Pt's [place] CM Humberto Hodgkin  [phone] is working on transportation with waiver program so the pt can go to [facility] and have Medicaid pay for transport to and from [facility] MWF [phone].

## 2020-12-17 NOTE — PROGRESS NOTES
[facility]       Progress Note           Subjective/   44y.o. year old male who we are seeing in consultation for ESRD. Still constipated. Still having pain at the exit site. Not able to walk. Says he cannot go home. ROS: No fever or chills. Eating well  Social: No family at bedside. Objective/   GEN:  Chronically ill, /67   Pulse 73   Temp 98 °F (36.7 °C) (Oral)   Resp 14   Ht 5' 9\" (1.753 m)   Wt 260 lb 2.3 oz (118 kg) Comment: pt refused standing wt. RN notified  SpO2 94%   BMI 38.42 kg/m²      HEENT: non-icteric, no JVD  CV: S1, S2 without m/r/g; no LE edema  RESP: CTA B without w/r/r; breathing wnl  ABD: +bs, soft, nt, no hsm  SKIN: warm, no rashes  ACCESS: L [facility]    Data/  Recent Labs     12/15/20  1147 12/16/20  0833 12/17/20  1217   WBC 11.6* 10.3 9.8   HGB 9.6* 10.1* 9.6*   HCT 29.7* 31.1* 29.8*   MCV 90.4 91.5 91.2    275 260     Recent Labs     12/15/20  1147 12/16/20  0833 12/17/20  1217   * 130* 128*   K 5.0 5.1 5.2*   CL 95* 92* 93*   CO2 24 26 25   GLUCOSE 125* 95 193*   BUN 29* 40* 33*   CREATININE 5.5* 6.6* 5.0*   LABGLOM 11* 9* 12*   GFRAA 13* 11* 15*       Assessment/Plan     ESRD.  - On HD MWF at [facility]. - Vascular access: TDC. Started doxycycline for exit site infection and transition to IV vancomycin with dialysis ( no IV access ). Constipation:  Magnesium citrate, miralax, and colace      Hyponatremia. - Likely from fluid in an ESRD patient. - UF with HD. Fluid restriction.     Hypertension.  - BP readings better after stopping Hydralazine.  - On Torsemide, Carvedilol, Diltiazem, Imdur and Losartan.     Anemia.  - Aranesp 25mcg qweekly with HD qWednesday. - Hgb stable      Bilateral LE Weakness. - Appears to be peripheral neuropathy.   Poor chance of improvement   - Needing placement

## 2020-12-17 NOTE — PROGRESS NOTES
12/17/20 0340   NIV Type   $NIV $Daily Charge   NIV Started/Stopped On   Equipment Type v60   Mode CPAP   Mask Type Full face mask   Mask Size Medium   Settings/Measurements   CPAP/EPAP 10 cmH2O   Resp 19   FiO2  30 %   Vt Exhaled 599 mL   Mask Leak (lpm) 18 lpm   Comfort Level Good   Using Accessory Muscles No

## 2020-12-17 NOTE — PROGRESS NOTES
12/16/20 2326   NIV Type   NIV Started/Stopped On   Equipment Type v60   Mode CPAP   Mask Type Full face mask   Mask Size Medium   Settings/Measurements   CPAP/EPAP 10 cmH2O   Resp 15   FiO2  30 %   Vt Exhaled 530 mL   Mask Leak (lpm) 12 lpm   Comfort Level Good   Using Accessory Muscles No   Breath Sounds   Right Upper Lobe Diminished   Right Middle Lobe Diminished   Right Lower Lobe Diminished   Left Upper Lobe Diminished   Left Lower Lobe Diminished   Alarm Settings   Alarms On Y   Press Low Alarm 6 cmH2O   Resp Rate Low Alarm 6   High Respiratory Rate 40 br/min

## 2020-12-17 NOTE — PROGRESS NOTES
Hospitalist Progress Note      PCP: Don Veliz MD    Date of Admission: 12/4/2020    Chief Complaint: Bilateral lower extremity weakness, numbness, and inability to walk for 2 days. Hospital Course: 44 y.o. male who presented to [facility] with above complaint. He has a history of diabetes and neuropathy. He does have lower back pain for long time. He has been feeling more numbness for last 2 days and today he could not get up and walk. He felt like he does not have leg. He fell couple of times and there is no apparent injuries. Currently he does not feel below the mid calf bilateral lower extremities. And he is able to move some extent lower extremities but unable to walk. Subjective:  HR stable.   Still without BM,    Medications:  Reviewed    Infusion Medications   Scheduled Medications    doxycycline hyclate  100 mg Oral 2 times per day    polyethylene glycol  17 g Oral BID    pantoprazole  40 mg Oral BID AC    carvedilol  6.25 mg Oral BID WC    darbepoetin siddharth-polysorbate  25 mcg Intravenous Q7 Days    lidocaine  1 patch Transdermal Daily    insulin lispro  0-12 Units Subcutaneous TID WC    insulin lispro  0-6 Units Subcutaneous Nightly    aspirin  81 mg Oral Daily    clopidogrel  75 mg Oral Daily    DULoxetine  30 mg Oral Daily    gabapentin  100 mg Oral TID    insulin glargine  70 Units Subcutaneous Nightly    isosorbide mononitrate  30 mg Oral Daily    linaclotide  145 mcg Oral QAM AC    losartan  50 mg Oral Daily    pravastatin  80 mg Oral Nightly    pregabalin  25 mg Oral TID    QUEtiapine  50 mg Oral QPM    glycopyrrolate-formoterol  2 puff Inhalation BID    tiZANidine  2 mg Oral TID    torsemide  100 mg Oral Daily    traZODone  150 mg Oral Nightly    sodium chloride flush  10 mL Intravenous 2 times per day    heparin (porcine)  5,000 Units Subcutaneous 3 times per day     PRN Meds: prochlorperazine, oxyCODONE-acetaminophen, calcium carbonate, albuterol sulfate HFA, heparin (porcine), sodium chloride flush, acetaminophen **OR** acetaminophen, promethazine **OR** ondansetron      Intake/Output Summary (Last 24 hours) at 12/16/2020 1957  Last data filed at 12/16/2020 1632  Gross per 24 hour   Intake 1600 ml   Output 3000 ml   Net -1400 ml       Physical Exam Performed:    /67   Pulse 82   Temp 98.2 °F (36.8 °C) (Oral)   Resp 18   Ht 5' 9\" (1.753 m)   Wt 256 lb 2.8 oz (116.2 kg)   SpO2 93%   BMI 37.83 kg/m²     General appearance: No apparent distress, appears stated age and cooperative. HEENT: Pupils equal, round, and reactive to light. Conjunctivae/corneas clear. Neck: Supple, with full range of motion. No jugular venous distention. Trachea midline. Respiratory:  Normal respiratory effort. Clear to auscultation, bilaterally without Rales/Wheezes/Rhonchi. Cardiovascular: Regular rate and rhythm with normal S1/S2 without murmurs, rubs or gallops. Abdomen: Soft, non-tender, non-distended with normal bowel sounds. Musculoskeletal: No clubbing, cyanosis or edema bilaterally. Full range of motion without deformity. Skin: Skin color, texture, turgor normal.  No rashes or lesions. Neurologic:  Abnormal sensation below the knee bilaterally. 3/5 weakness BLE. Psychiatric: Alert and oriented, thought content appropriate, normal insight  Capillary Refill: Brisk,< 3 seconds   Peripheral Pulses: +2 palpable, equal bilaterally       Labs:   Recent Labs     12/14/20  0725 12/15/20  1147 12/16/20  0833   WBC 11.1* 11.6* 10.3   HGB 10.3* 9.6* 10.1*   HCT 30.8* 29.7* 31.1*    280 275     Recent Labs     12/14/20  0725 12/15/20  1147 12/16/20  0833   * 129* 130*   K 4.5 5.0 5.1   CL 91* 95* 92*   CO2 27 24 26   BUN 43* 29* 40*   CREATININE 5.9* 5.5* 6.6*   CALCIUM 9.1 9.3 9.6     No results for input(s): AST, ALT, BILIDIR, BILITOT, ALKPHOS in the last 72 hours. No results for input(s): INR in the last 72 hours.   No results for input(s): CKTOTAL, TROPONINI in the last 72 hours. Urinalysis:      Lab Results   Component Value Date    NITRU Negative 06/05/2020    WBCUA 10-20 06/05/2020    BACTERIA 2+ 06/05/2020    RBCUA 0-2 06/05/2020    BLOODU TRACE-INTACT 06/05/2020    SPECGRAV 1.020 06/05/2020    GLUCOSEU 250 06/05/2020       Radiology:  IR LUMBAR PUNCTURE FOR DIAGNOSIS   Final Result   Successful fluoroscopic-guided lumbar puncture. CT ABDOMEN PELVIS W IV CONTRAST Additional Contrast? Radiologist Recommendation   Final Result   No acute abdominopelvic abnormality. MRI THORACIC SPINE WO CONTRAST   Final Result   No acute thoracic spine abnormality. No thoracic spinal canal or neural   foraminal stenosis. MRI CERVICAL SPINE WO CONTRAST   Final Result   No acute cervical spine abnormality. Mild degenerative spondylosis without significant cervical spinal canal   stenosis or high-grade neural foraminal stenosis. Mild left C4-C5 and right C6-C7 neural foraminal stenosis. MRI LUMBAR SPINE WO CONTRAST   Final Result   1. Mild L1-2 and L5-S1 degenerative disc height loss. 2. Moderate right L5 neural foraminal narrowing secondary to a right   foraminal L5-S1 disc protrusion. 3. Mild L1-2 spinal canal stenosis. CT ABDOMEN PELVIS WO CONTRAST   Final Result   1. No CT evidence of an acute intra-abdominal or intrapelvic process. 2.  No findings to suggest acute appendicitis; no ureter calculus or   hydronephrosis. 3. Probably reactive lymph nodes in the right upper quadrant, unchanged from   prior study. 4.  Mild extrahepatic bile duct dilatation status post cholecystectomy   typical of reservoir effect. 5.  Calcific atherosclerotic disease aorta. 6. Small, fat containing umbilical hernia. 7. Benign left adrenal adenoma requires no additional evaluation or follow-up.                  Assessment/Plan:    Active Hospital Problems    Diagnosis    S/P TAVR (transcatheter aortic valve replacement) [Z95.2] Priority: Medium    Sinus pause [I45.5]    GBS (Guillain-Ventura syndrome) ([facility]) [G61.0]    Bilateral leg weakness [R29.898]    ESRD (end stage renal disease) on dialysis ([facility] [N18.6, Z99.2]    DM (diabetes mellitus), secondary, uncontrolled, w/neurologic complic ([facility]) [L25.28, K44.00]    Nonischemic cardiomyopathy ([facility] [I42.8]     Bilateral lower extremity weakness numbness - unclear etiology. Exam seems inconsistent.  - MRI shows L5-S1 disc protrusion.  - discussed with Ortho Spine - recommended MRI cervical and thoracic spine to rule out epidural abscess, given elevated ESR and CRP. This was WNL  - neurology consulted and now signed off.  - s/p LP 12/11/2020, which was unrevealing.     ESRD on HD   - nephrology consulted and following.  - MWF HD     Chronic back pain   - continue home medication and muscle relaxant  - increase to Percocet from 7.5 to 10mg/325.   - follows with pain management as outpatient.     DM2 with neuropathy  - continue Lantus and medium dose SSI. - continue gabapentin and Cymbalta. Hyponatremia   - nephrology following.  - hold Celexa.     Anemia - likely due to ESRD.     Chronic diastolic CHF - no evidence of fluid overload     COPD - stable. Continue home inhalers     CAD -  Continue Pravachol and BB. Continue to hold ASA and Plavix overnight in case of need for pacer. Bradycardia with pauses  - cardiology consulted and following.  - discontinue diltiazem. Resume Coreg at 6.25 mg.    - EP consulted for possible pacer placement    Constipation - likely due to immobility and narcotic use. - bowel regimen in place. DVT Prophylaxis: Subcutaneous Heparin  Diet: DIET RENAL; Daily Fluid Restriction: 1000 ml  Dietary Nutrition Supplements: Diabetic Oral Supplement  Code Status: Full Code    PT/OT Eval Status: recommend ARU, but insurance denied.   Planning for EGS    Dispo - hopefully tomorrow    Abdon Marcelo, APRN - CNP

## 2020-12-17 NOTE — PROGRESS NOTES
12/16/20 2054   NIV Type   NIV Started/Stopped On   Equipment Type v60   Mode CPAP   Mask Type Full face mask   Mask Size Medium   Settings/Measurements   CPAP/EPAP 10 cmH2O   Resp 30   FiO2  30 %   Vt Exhaled 506 mL   Mask Leak (lpm) 16 lpm   Comfort Level Good   Using Accessory Muscles No   SpO2 99   Breath Sounds   Right Upper Lobe Diminished   Right Middle Lobe Diminished   Right Lower Lobe Diminished   Left Upper Lobe Diminished   Left Lower Lobe Diminished   Patient Observation   Observations pt refused mepilex

## 2020-12-17 NOTE — PROGRESS NOTES
Physical Therapy  Facility/Department: [place] [address] [facility]  Daily Treatment Note  NAME: Giana Coles  : 1970  MRN: [id number]    Date of Service: 2020    Discharge Recommendations:  Subacute/Skilled Nursing Facility   PT Equipment Recommendations  Equipment Needed: Yes(If pt DC home)  Mobility Devices: Benito Apple: Rolling  Other: Defer to facility  If pt is unable to be seen after this session, please let this note serve as discharge summary. Please see case management note for discharge disposition. Thank you. Assessment   Body structures, Functions, Activity limitations: Decreased functional mobility ; Decreased strength;Decreased balance;Decreased sensation;Decreased endurance;Decreased ADL status; Decreased posture;Decreased cognition;Decreased safe awareness;Decreased high-level IADLs; Increased pain  Assessment: Goals met this date and progressed. Pt was able to ambulate 39' wtih RW with max encouragement. Pt with anxiety of knee buckling but only requried CGA. Pt continues to c/o numbness and high levels of pain. Requires encouragement and frequent rest breaks. Pt positinoed in chair at [facility]. Recommend SNF upon DC. Treatment Diagnosis: Impaired sensation and mobility, weakness  Prognosis: Fair  Decision Making: Medium Complexity  PT Education: Goals;PT Role;Plan of Care;General Safety;Transfer Training;Gait Training;Functional Mobility Training;Disease Specific Education; Injury Prevention  Patient Education: OOB activities. Pt expressed understanding  Barriers to Learning: none  REQUIRES PT FOLLOW UP: Yes  Activity Tolerance  Activity Tolerance: Patient Tolerated treatment well;Patient limited by endurance; Patient limited by pain     Patient Diagnosis(es): The primary encounter diagnosis was Bilateral leg weakness. Diagnoses of Numbness and tingling of both legs, ESRD (end stage renal disease) on dialysis ([facility] [address] and Sinus pause were also pertinent to this visit.      has a past medical history of Ambulatory dysfunction, Aortic stenosis, Arthritis, Asthma, Bilateral hilar adenopathy syndrome, CAD (coronary artery disease), Cardiomyopathy ([address] CHF (congestive heart failure) ([address] Chronic pain, COPD (chronic obstructive pulmonary disease) ([address] Depression, Diabetes mellitus ([address] Difficult intravenous access, Emphysema of lung ([address] ESRD (end stage renal disease) on dialysis ([address] Fear of needles, Gastric ulcer, GERD (gastroesophageal reflux disease), Heart valve problem, Hemodialysis patient ([address] History of spinal fracture, Hx of blood clots, Hyperlipidemia, Hypertension, MI (myocardial infarction) ([address] Neuromuscular disorder ([address] Numbness and tingling in left arm, Pneumonia, PONV (postoperative nausea and vomiting), Prolonged emergence from general anesthesia, Sleep apnea, Stroke ([address] TIA (transient ischemic attack), and Unspecified diseases of blood and blood-forming organs. has a past surgical history that includes Tonsillectomy; cyst removal (08/14/2013); Colonoscopy; Coronary angioplasty with stent (05/26/15); vascular surgery (aprx 2 years ago); Colonoscopy (2/29/2015); Upper gastrointestinal endoscopy (01/06/2016); Upper gastrointestinal endoscopy (01/29/2017); other surgical history (02/01/2017); Dialysis fistula creation (Left, 10/30/2017); Diagnostic Cardiac Cath Lab Procedure; other surgical history (2018); other surgical history (Bilateral, 06/26/2018); pr lap insertion tunneled intraperitoneal catheter (N/A, 9/21/2018); other surgical history (Right, 09/2018); Dialysis fistula creation (Left, 3/27/2019); other surgical history (05/28/2019); Endoscopy, colon, diagnostic; Catheter Removal (Right, 7/2/2019); and Aortic valve replacement (N/A, 10/15/2019).     Restrictions  Restrictions/Precautions  Restrictions/Precautions: Fall Risk, Up as Tolerated, General Precautions  Required Braces or Orthoses?: No  Position Activity Restriction  Other position/activity restrictions: Up with assist, telemetry  Subjective   General  Chart Reviewed: Yes  Response To Previous Treatment: Patient with no complaints from previous session. Family / Caregiver Present: No  Referring Practitioner: JAY Davila CNP  Subjective  Subjective: Pt resting in bed. c/o 10/10 pain in B LE. RN provided pain medication. Required encouragement to participate  General Comment  Comments: cleared by nursing          Orientation  Orientation  Overall Orientation Status: Within Functional Limits  Cognition      Objective   Bed mobility  Supine to Sit: Supervision  Sit to Supine: Unable to assess  Transfers  Sit to Stand: Contact guard assistance  Stand to sit: Contact guard assistance  Ambulation  Ambulation?: Yes  More Ambulation?: No  Ambulation 1  Surface: level tile  Device: Rolling Walker  Assistance: Contact guard assistance  Quality of Gait: inconsistent pattern; occasional assist to direct rw  Gait Deviations: Slow Ольга;Decreased step length;Shuffles  Distance: 39' + 40'  Stairs/Curb  Stairs?: No     Balance  Posture: Fair  Sitting - Static: Good  Sitting - Dynamic: Good  Standing - Static: Fair  Standing - Dynamic: Fair  Exercises  Hip Flexion: 1 x 15  Hip Abduction: 1 x 10 (with pillow between knees)  Knee Long Arc Quad: 1 x 10  Ankle Pumps: 2 x 10   PROM RLE (degrees)  RLE PROM: WFL  AROM RLE (degrees)  RLE AROM: WFL  PROM LLE (degrees)  LLE PROM: WFL  AROM LLE (degrees)  LLE AROM : WFL              Goals  Short term goals  Time Frame for Short term goals: 1 week, 12/15/20 unless otherwise noted. Short term goal 1: Pt will perform bed mobility with independence; 12/17: supervision, elevated HOB  Short term goal 2: Pt will perform sit<>stand transfer with min A; 12/17: CGA. Goal MET  Short term goal 3: Pt will perform bed<>chair transfer with LRAD and min A; 12/17: RW, CGA  Short term goal 4: Pt will ambulate 20ft with LRAD and min A; 12/17: goal met ambulating 39' with RW with CGA. Goal progressed to ambulation x 76' with RW with supervision  Short term goal 5: By 12/11 pt will tolerate x 10-15 reps BLE exercise to assist with functional transfers and ambulation. ; 12/17: MET, goal ongoing  Patient Goals   Patient goals : \"To feel my legs again. \"    Plan    Plan  Times per week: 3-5x/wk  Times per day: Daily  Current Treatment Recommendations: Strengthening, Balance Training, Functional Mobility Training, Gait Training, Endurance Training, Safety Education & Training, Patient/Caregiver Education & Training  Safety Devices  Type of devices:  All fall risk precautions in place, Patient at risk for falls, Nurse notified, Call light within reach, Left in chair, Gait belt, Chair alarm in place  Restraints  Initially in place: No     Therapy Time   Individual Concurrent Group Co-treatment   Time In 1434         Time Out 1513         Minutes [facility] Services[facility]an ramonF[place], [place]

## 2020-12-17 NOTE — PROGRESS NOTES
Occupational Therapy  Facility/Department: [place] [address] [facility]  Daily Treatment Note  NAME: Ata Sanford  : 1970  MRN: [id number]    Date of Service: 2020    Discharge Recommendations:  [address] scored a 15/24 on the AM-PAC ADL Inpatient form. Current research shows that an AM-PAC score of 17 or less is typically not associated with a discharge to the patient's home setting. Based on the patient's AM-PAC score and their current ADL deficits, it is recommended that the patient have 3-5 sessions per week of Occupational Therapy at d/c to increase the patient's independence. Please see assessment section for further patient specific details. If patient discharges prior to next session this note will serve as a discharge summary. Please see below for the latest assessment towards goals. Assessment   Performance deficits / Impairments: Decreased functional mobility ; Decreased ADL status; Decreased sensation;Decreased balance;Decreased strength;Decreased safe awareness;Decreased cognition  Assessment: OT continues to recommend SNF for skilled OT services at d/c. Pt demo's impaired standing balance and safety awareness which increases risk for loss of balance. Improvements this date in pt's ability to engage in functional mobility and increase standing tolerance. Cont skilled OT in acute care. Treatment Diagnosis: Weakness  Prognosis: Fair  OT Education: Precautions;OT Role;ADL Adaptive Strategies;Transfer Training;Plan of Care  Patient Education: disease specific: importance of assistance for all mobility; safe transfer technique  REQUIRES OT FOLLOW UP: Yes  Activity Tolerance  Activity Tolerance: Patient Tolerated treatment well;Patient limited by pain  Activity Tolerance: Pt able to conversate during therapy with no signs or symptoms of pain, but continuous to report 10/10.  Pt demonstrating fair tolerance for participating in intensive therapy. Safety Devices  Safety Devices in place: Yes  Type of devices: Call light within reach; Chair alarm in place; Left in chair;Nurse notified;Gait belt         Patient Diagnosis(es): The primary encounter diagnosis was Bilateral leg weakness. Diagnoses of Numbness and tingling of both legs, ESRD (end stage renal disease) on dialysis ([address] and Sinus pause were also pertinent to this visit. has a past medical history of Ambulatory dysfunction, Aortic stenosis, Arthritis, Asthma, Bilateral hilar adenopathy syndrome, CAD (coronary artery disease), Cardiomyopathy ([address] CHF (congestive heart failure) ([address] Chronic pain, COPD (chronic obstructive pulmonary disease) ([address] Depression, Diabetes mellitus ([address] Difficult intravenous access, Emphysema of lung ([address] ESRD (end stage renal disease) on dialysis ([address] Fear of needles, Gastric ulcer, GERD (gastroesophageal reflux disease), Heart valve problem, Hemodialysis patient ([address] History of spinal fracture, Hx of blood clots, Hyperlipidemia, Hypertension, MI (myocardial infarction) ([address] Neuromuscular disorder ([address] Numbness and tingling in left arm, Pneumonia, PONV (postoperative nausea and vomiting), Prolonged emergence from general anesthesia, Sleep apnea, Stroke ([address] TIA (transient ischemic attack), and Unspecified diseases of blood and blood-forming organs. has a past surgical history that includes Tonsillectomy; cyst removal (08/14/2013); Colonoscopy; Coronary angioplasty with stent (05/26/15); vascular surgery (aprx 2 years ago); Colonoscopy (2/29/2015); Upper gastrointestinal endoscopy (01/06/2016); Upper gastrointestinal endoscopy (01/29/2017); other surgical history (02/01/2017); Dialysis fistula creation (Left, 10/30/2017);  Diagnostic Cardiac Cath Lab Procedure; other surgical history (2018); other surgical history (Bilateral, 06/26/2018); pr lap insertion tunneled intraperitoneal catheter (N/A, 9/21/2018); other surgical history (Right, 09/2018); Dialysis fistula creation (Left, 3/27/2019); other surgical history (05/28/2019); Endoscopy, colon, diagnostic; Catheter Removal (Right, 7/2/2019); and Aortic valve replacement (N/A, 10/15/2019). Restrictions  Restrictions/Precautions  Restrictions/Precautions: Fall Risk, Up as Tolerated, General Precautions  Required Braces or Orthoses?: No  Position Activity Restriction  Other position/activity restrictions: Up with assist, telemetry  Subjective   General  Chart Reviewed: Yes  Patient assessed for rehabilitation services?: Yes  Response to previous treatment: Patient with no complaints from previous session  Family / Caregiver Present: No  Referring Practitioner: ROSSY Ricardo  Diagnosis: BLE weakness  Subjective  Subjective: Pt agreeable to OT/PT cotx with encouragement. Pt continues to report chronic pain issues with minimal agreeable to persist through challenging tasks. General Comment  Comments: RN approved therapy. Pt seen for co-tx with PT to safely progress mobility and ADLs. Pain Assessment  Pain Assessment: 0-10  Pre Treatment Pain Screening  Intervention List: Patient able to continue with treatment;Nurse/Physician notified; Patient reports medications are not effective Medical Staff notified  Vital Signs  Patient Currently in Pain: Yes   Orientation     Objective    ADL  Feeding: Independent  Additional Comments: declined the need for ADLs this date        Balance  Sitting Balance: Supervision  Standing Balance: Contact guard assistance  Standing Balance  Time: 1-2 minute x 2  Activity: 44ft x2  Comment: CGA; constant encouragement to persist  Functional Mobility  Functional - Mobility Device: Rolling Walker  Activity: Other(44ft x2)  Assist Level: Contact guard assistance  Functional Mobility Comments: CGA  Bed mobility  Supine to Sit: Supervision  Scooting: Supervision  Transfers  Sit to stand: Contact guard assistance  Stand to sit: Contact guard assistance  Transfer Comments: cues for safe hand placement & to manage RW to chair; pt sitting too soon to decend into chair                       Cognition  Overall Cognitive Status: Exceptions  Arousal/Alertness: Appropriate responses to stimuli  Following Commands: Follows one step commands consistently  Attention Span: Attends with cues to redirect  Memory: Decreased recall of precautions  Safety Judgement: Decreased awareness of need for safety;Decreased awareness of need for assistance  Problem Solving: Assistance required to identify errors made;Assistance required to implement solutions;Decreased awareness of errors;Assistance required to correct errors made;Assistance required to generate solutions                    Type of ROM/Therapeutic Exercise  Type of ROM/Therapeutic Exercise: AROM  Comment: seated on chair  Exercises  Shoulder Flexion: 10x  Horizontal ABduction: 10x  Horizontal ADduction: 10x  Elbow Flexion: 10x  Elbow Extension: 10x  Grasp/Release: 10x                    Plan   Plan  Times per week: 3-5x  Current Treatment Recommendations: Strengthening, Balance Training, Functional Mobility Training, Endurance Training, Safety Education & Training, Neuromuscular Re-education, Self-Care / ADL, Patient/Caregiver Education & Training           AM-PAC Score        AM-PAC Inpatient Daily Activity Raw Score: 15 (12/17/20 1604)  AM-PAC Inpatient ADL T-Scale Score : 34.69 (12/17/20 1604)  ADL Inpatient CMS 0-100% Score: 56.46 (12/17/20 1604)  ADL Inpatient CMS G-Code Modifier : CK (12/17/20 1604)    Goals  Short term goals  Time Frame for Short term goals: 7 days 12/20/20  Short term goal 1: Pt will complete toilet transfer with MIN A--GOAL MET 12/17; upgrade to supervision  Short term goal 2: Pt will complete LB dressing with MIN A with use of AE as needed---ongoing, 12/17  Short term goal 3: Pt will complete short house hold distacne with RW with MIN A-- ongoing, CGA x1 44ft 12/17  Patient Goals   Patient goals :  Will complete ADLs with independent       Therapy Time   Individual Concurrent Group Co-treatment   Time In 1434         Time Out 1515         Minutes 41         Timed Code Treatment Minutes: DESIREE AguilarR/L

## 2020-12-18 LAB
ANION GAP SERPL CALCULATED.3IONS-SCNC: 10 MMOL/L (ref 3–16)
ANISOCYTOSIS: ABNORMAL
ATYPICAL LYMPHOCYTE RELATIVE PERCENT: 4 % (ref 0–6)
BANDED NEUTROPHILS RELATIVE PERCENT: 9 % (ref 0–7)
BASOPHILIC STIPPLING: ABNORMAL
BASOPHILS ABSOLUTE: 0.1 K/UL (ref 0–0.2)
BASOPHILS RELATIVE PERCENT: 1 %
BUN BLDV-MCNC: 42 MG/DL (ref 7–20)
CALCIUM SERPL-MCNC: 9.4 MG/DL (ref 8.3–10.6)
CHLORIDE BLD-SCNC: 90 MMOL/L (ref 99–110)
CO2: 27 MMOL/L (ref 21–32)
CREAT SERPL-MCNC: 6.3 MG/DL (ref 0.9–1.3)
CSF CULTURE: NORMAL
EOSINOPHILS ABSOLUTE: 0.4 K/UL (ref 0–0.6)
EOSINOPHILS RELATIVE PERCENT: 4 %
GFR AFRICAN AMERICAN: 11
GFR NON-AFRICAN AMERICAN: 9
GLUCOSE BLD-MCNC: 102 MG/DL (ref 70–99)
GLUCOSE BLD-MCNC: 105 MG/DL (ref 70–99)
GLUCOSE BLD-MCNC: 125 MG/DL (ref 70–99)
GLUCOSE BLD-MCNC: 155 MG/DL (ref 70–99)
GLUCOSE BLD-MCNC: 263 MG/DL (ref 70–99)
GRAM STAIN RESULT: NORMAL
HCT VFR BLD CALC: 28.2 % (ref 40.5–52.5)
HEMOGLOBIN: 9.4 G/DL (ref 13.5–17.5)
LYMPHOCYTES ABSOLUTE: 1.9 K/UL (ref 1–5.1)
LYMPHOCYTES RELATIVE PERCENT: 16 %
MACROCYTES: ABNORMAL
MCH RBC QN AUTO: 29.8 PG (ref 26–34)
MCHC RBC AUTO-ENTMCNC: 33.2 G/DL (ref 31–36)
MCV RBC AUTO: 89.8 FL (ref 80–100)
MICROCYTES: ABNORMAL
MONOCYTES ABSOLUTE: 0.6 K/UL (ref 0–1.3)
MONOCYTES RELATIVE PERCENT: 6 %
NEUTROPHILS ABSOLUTE: 6.6 K/UL (ref 1.7–7.7)
NEUTROPHILS RELATIVE PERCENT: 60 %
PDW BLD-RTO: 13.7 % (ref 12.4–15.4)
PERFORMED ON: ABNORMAL
PLATELET # BLD: 263 K/UL (ref 135–450)
PLATELET SLIDE REVIEW: ADEQUATE
PMV BLD AUTO: 7.4 FL (ref 5–10.5)
POLYCHROMASIA: ABNORMAL
POTASSIUM REFLEX MAGNESIUM: 4.6 MMOL/L (ref 3.5–5.1)
RBC # BLD: 3.14 M/UL (ref 4.2–5.9)
SARS-COV-2: NOT DETECTED
SLIDE REVIEW: ABNORMAL
SODIUM BLD-SCNC: 127 MMOL/L (ref 136–145)
WBC # BLD: 9.5 K/UL (ref 4–11)

## 2020-12-18 PROCEDURE — 6370000000 HC RX 637 (ALT 250 FOR IP): Performed by: INTERNAL MEDICINE

## 2020-12-18 PROCEDURE — 85025 COMPLETE CBC W/AUTO DIFF WBC: CPT

## 2020-12-18 PROCEDURE — 6370000000 HC RX 637 (ALT 250 FOR IP): Performed by: NURSE PRACTITIONER

## 2020-12-18 PROCEDURE — 94660 CPAP INITIATION&MGMT: CPT

## 2020-12-18 PROCEDURE — 6360000002 HC RX W HCPCS: Performed by: INTERNAL MEDICINE

## 2020-12-18 PROCEDURE — 90935 HEMODIALYSIS ONE EVALUATION: CPT

## 2020-12-18 PROCEDURE — 36415 COLL VENOUS BLD VENIPUNCTURE: CPT

## 2020-12-18 PROCEDURE — 94640 AIRWAY INHALATION TREATMENT: CPT

## 2020-12-18 PROCEDURE — U0003 INFECTIOUS AGENT DETECTION BY NUCLEIC ACID (DNA OR RNA); SEVERE ACUTE RESPIRATORY SYNDROME CORONAVIRUS 2 (SARS-COV-2) (CORONAVIRUS DISEASE [COVID-19]), AMPLIFIED PROBE TECHNIQUE, MAKING USE OF HIGH THROUGHPUT TECHNOLOGIES AS DESCRIBED BY CMS-2020-01-R: HCPCS

## 2020-12-18 PROCEDURE — 2700000000 HC OXYGEN THERAPY PER DAY

## 2020-12-18 PROCEDURE — 80048 BASIC METABOLIC PNL TOTAL CA: CPT

## 2020-12-18 PROCEDURE — 1200000000 HC SEMI PRIVATE

## 2020-12-18 RX ORDER — CARVEDILOL 6.25 MG/1
6.25 TABLET ORAL 2 TIMES DAILY WITH MEALS
Qty: 60 TABLET | Refills: 3 | Status: ON HOLD
Start: 2020-12-18 | End: 2021-01-05 | Stop reason: HOSPADM

## 2020-12-18 RX ORDER — LIDOCAINE 4 G/G
1 PATCH TOPICAL DAILY
Qty: 30 PATCH | Refills: 0
Start: 2020-12-19 | End: 2021-05-07 | Stop reason: ALTCHOICE

## 2020-12-18 RX ORDER — OXYCODONE AND ACETAMINOPHEN 10; 325 MG/1; MG/1
1 TABLET ORAL EVERY 6 HOURS PRN
Qty: 20 TABLET | Refills: 0 | Status: SHIPPED | OUTPATIENT
Start: 2020-12-18 | End: 2020-12-23

## 2020-12-18 RX ORDER — GABAPENTIN 100 MG/1
100 CAPSULE ORAL 3 TIMES DAILY
Qty: 15 CAPSULE | Refills: 0 | Status: ON HOLD | OUTPATIENT
Start: 2020-12-18 | End: 2021-01-05 | Stop reason: HOSPADM

## 2020-12-18 RX ORDER — DOXYCYCLINE HYCLATE 100 MG
100 TABLET ORAL EVERY 12 HOURS SCHEDULED
Status: DISCONTINUED | OUTPATIENT
Start: 2020-12-18 | End: 2020-12-21 | Stop reason: HOSPADM

## 2020-12-18 RX ORDER — PREGABALIN 25 MG/1
25 CAPSULE ORAL 3 TIMES DAILY
Qty: 15 CAPSULE | Refills: 0 | Status: SHIPPED | OUTPATIENT
Start: 2020-12-18 | End: 2021-02-25 | Stop reason: ALTCHOICE

## 2020-12-18 RX ADMIN — TRAZODONE HYDROCHLORIDE 150 MG: 50 TABLET ORAL at 21:38

## 2020-12-18 RX ADMIN — TIZANIDINE 2 MG: 4 TABLET ORAL at 21:28

## 2020-12-18 RX ADMIN — QUETIAPINE FUMARATE 50 MG: 25 TABLET ORAL at 17:43

## 2020-12-18 RX ADMIN — GABAPENTIN 100 MG: 100 CAPSULE ORAL at 13:56

## 2020-12-18 RX ADMIN — OXYCODONE HYDROCHLORIDE AND ACETAMINOPHEN 1 TABLET: 10; 325 TABLET ORAL at 13:58

## 2020-12-18 RX ADMIN — TORSEMIDE 100 MG: 100 TABLET ORAL at 09:00

## 2020-12-18 RX ADMIN — PREGABALIN 25 MG: 25 CAPSULE ORAL at 13:58

## 2020-12-18 RX ADMIN — DOXYCYCLINE HYCLATE 100 MG: 100 TABLET, COATED ORAL at 21:27

## 2020-12-18 RX ADMIN — GABAPENTIN 100 MG: 100 CAPSULE ORAL at 21:27

## 2020-12-18 RX ADMIN — HEPARIN SODIUM 5000 UNITS: 5000 INJECTION INTRAVENOUS; SUBCUTANEOUS at 05:25

## 2020-12-18 RX ADMIN — INSULIN LISPRO 6 UNITS: 100 INJECTION, SOLUTION INTRAVENOUS; SUBCUTANEOUS at 17:40

## 2020-12-18 RX ADMIN — INSULIN LISPRO 1 UNITS: 100 INJECTION, SOLUTION INTRAVENOUS; SUBCUTANEOUS at 21:29

## 2020-12-18 RX ADMIN — PREGABALIN 25 MG: 25 CAPSULE ORAL at 21:27

## 2020-12-18 RX ADMIN — PANTOPRAZOLE SODIUM 40 MG: 40 TABLET, DELAYED RELEASE ORAL at 16:33

## 2020-12-18 RX ADMIN — LOSARTAN POTASSIUM 50 MG: 25 TABLET, FILM COATED ORAL at 09:00

## 2020-12-18 RX ADMIN — ASPIRIN 81 MG CHEWABLE TABLET 81 MG: 81 TABLET CHEWABLE at 09:00

## 2020-12-18 RX ADMIN — TIZANIDINE 2 MG: 4 TABLET ORAL at 14:09

## 2020-12-18 RX ADMIN — OXYCODONE HYDROCHLORIDE AND ACETAMINOPHEN 1 TABLET: 10; 325 TABLET ORAL at 21:27

## 2020-12-18 RX ADMIN — INSULIN GLARGINE 70 UNITS: 100 INJECTION, SOLUTION SUBCUTANEOUS at 21:29

## 2020-12-18 RX ADMIN — PRAVASTATIN SODIUM 80 MG: 80 TABLET ORAL at 21:38

## 2020-12-18 RX ADMIN — PANTOPRAZOLE SODIUM 40 MG: 40 TABLET, DELAYED RELEASE ORAL at 05:25

## 2020-12-18 RX ADMIN — HEPARIN SODIUM 5000 UNITS: 5000 INJECTION INTRAVENOUS; SUBCUTANEOUS at 13:59

## 2020-12-18 RX ADMIN — DULOXETINE HYDROCHLORIDE 30 MG: 30 CAPSULE, DELAYED RELEASE ORAL at 09:00

## 2020-12-18 RX ADMIN — GLYCOPYRROLATE AND FORMOTEROL FUMARATE 2 PUFF: 9; 4.8 AEROSOL, METERED RESPIRATORY (INHALATION) at 21:51

## 2020-12-18 RX ADMIN — CLOPIDOGREL BISULFATE 75 MG: 75 TABLET ORAL at 09:00

## 2020-12-18 RX ADMIN — HEPARIN SODIUM 5000 UNITS: 5000 INJECTION INTRAVENOUS; SUBCUTANEOUS at 21:29

## 2020-12-18 RX ADMIN — OXYCODONE HYDROCHLORIDE AND ACETAMINOPHEN 1 TABLET: 10; 325 TABLET ORAL at 05:25

## 2020-12-18 ASSESSMENT — PAIN DESCRIPTION - LOCATION: LOCATION: BACK

## 2020-12-18 ASSESSMENT — PAIN DESCRIPTION - ORIENTATION: ORIENTATION: RIGHT;LEFT

## 2020-12-18 ASSESSMENT — PAIN DESCRIPTION - PAIN TYPE
TYPE: ACUTE PAIN
TYPE: ACUTE PAIN

## 2020-12-18 ASSESSMENT — PAIN SCALES - GENERAL
PAINLEVEL_OUTOF10: 9
PAINLEVEL_OUTOF10: 10
PAINLEVEL_OUTOF10: 8

## 2020-12-18 NOTE — CARE COORDINATION
Call back from [facility] CM) . Patient has been approved for wc transport to and from [facility] for HD at [facility]. This writer then contacted [place] [id number] (liaison ) [facility]. She states pre cert for SNF will likely not come through until Monday 12/21. Spoke with Etta Quezada CNP about various approvals. Plan at this time is to complete HD here on Monday 12/21, and then transport to [facility]. Glory Benavidez from [facility] is also aware of the plan, and will notify HD transport service. Patient informed of plan and is in agreement. Will follow.       Wes Villarreal RN

## 2020-12-18 NOTE — PROGRESS NOTES
12/18/20 0029   NIV Type   $NIV $Daily Charge   NIV Started/Stopped On   Equipment Type V60   Mode CPAP   Mask Type Full face mask   Mask Size Medium   Settings/Measurements   CPAP/EPAP 10 cmH2O   Resp 12   FiO2  30 %   Vt Exhaled 484 mL   Minute Volume 4.26 Liters   Mask Leak (lpm) 21 lpm   Comfort Level Good   Using Accessory Muscles No   SpO2 98   Breath Sounds   Right Upper Lobe Diminished   Right Middle Lobe Diminished   Right Lower Lobe Diminished   Left Upper Lobe Diminished   Left Lower Lobe Diminished   Alarm Settings   Alarms On Y   Press Low Alarm 6 cmH2O   High Pressure Alarm 25 cmH2O   Delay Alarm 20 sec(s)   Resp Rate Low Alarm 6   High Respiratory Rate 40 br/min

## 2020-12-18 NOTE — PROGRESS NOTES
12/18/20 0327   NIV Type   NIV Started/Stopped On   Equipment Type V60   Mode CPAP   Mask Type Full face mask   Mask Size Medium   Settings/Measurements   CPAP/EPAP 10 cmH2O   Resp 11   FiO2  30 %   Vt Exhaled 537 mL   Minute Volume 5.7 Liters   Mask Leak (lpm) 32 lpm   Comfort Level Good   Using Accessory Muscles No   SpO2 97   Breath Sounds   Right Upper Lobe Diminished   Right Middle Lobe Diminished   Right Lower Lobe Diminished   Left Upper Lobe Diminished   Left Lower Lobe Diminished   Alarm Settings   Alarms On Y

## 2020-12-18 NOTE — PROGRESS NOTES
Hospitalist Progress Note      PCP: Radha Iniguez MD    Date of Admission: 12/4/2020    Chief Complaint: Bilateral lower extremity weakness, numbness, and inability to walk for 2 days. Hospital Course: 44 y.o. male who presented to [facility] with above complaint. He has a history of diabetes and neuropathy. He does have lower back pain for long time. He has been feeling more numbness for last 2 days and today he could not get up and walk. He felt like he does not have leg. He fell couple of times and there is no apparent injuries. Currently he does not feel below the mid calf bilateral lower extremities. And he is able to move some extent lower extremities but unable to walk. Subjective:  HR stable.   Still without BM,    Medications:  Reviewed    Infusion Medications   Scheduled Medications    lactulose  20 g Oral 6 times per day    docusate sodium  100 mg Oral Daily    polyethylene glycol  17 g Oral BID    pantoprazole  40 mg Oral BID AC    carvedilol  6.25 mg Oral BID WC    darbepoetin siddharth-polysorbate  25 mcg Intravenous Q7 Days    lidocaine  1 patch Transdermal Daily    insulin lispro  0-12 Units Subcutaneous TID WC    insulin lispro  0-6 Units Subcutaneous Nightly    aspirin  81 mg Oral Daily    clopidogrel  75 mg Oral Daily    DULoxetine  30 mg Oral Daily    gabapentin  100 mg Oral TID    insulin glargine  70 Units Subcutaneous Nightly    isosorbide mononitrate  30 mg Oral Daily    linaclotide  145 mcg Oral QAM AC    losartan  50 mg Oral Daily    pravastatin  80 mg Oral Nightly    pregabalin  25 mg Oral TID    QUEtiapine  50 mg Oral QPM    glycopyrrolate-formoterol  2 puff Inhalation BID    tiZANidine  2 mg Oral TID    torsemide  100 mg Oral Daily    traZODone  150 mg Oral Nightly    sodium chloride flush  10 mL Intravenous 2 times per day    heparin (porcine)  5,000 Units Subcutaneous 3 times per day     PRN Meds: simethicone, prochlorperazine, oxyCODONE-acetaminophen, calcium carbonate, albuterol sulfate HFA, heparin (porcine), sodium chloride flush, acetaminophen **OR** acetaminophen, promethazine **OR** ondansetron      Intake/Output Summary (Last 24 hours) at 12/17/2020 2034  Last data filed at 12/17/2020 1556  Gross per 24 hour   Intake 1080 ml   Output 0 ml   Net 1080 ml       Physical Exam Performed:    /70   Pulse 77   Temp 98.1 °F (36.7 °C) (Oral)   Resp 14   Ht 5' 9\" (1.753 m)   Wt 260 lb 2.3 oz (118 kg) Comment: pt refused standing wt. RN notified  SpO2 96%   BMI 38.42 kg/m²     General appearance: No apparent distress, appears stated age and cooperative. HEENT: Pupils equal, round, and reactive to light. Conjunctivae/corneas clear. Neck: Supple, with full range of motion. No jugular venous distention. Trachea midline. Respiratory:  Normal respiratory effort. Clear to auscultation, bilaterally without Rales/Wheezes/Rhonchi. Cardiovascular: Regular rate and rhythm with normal S1/S2 without murmurs, rubs or gallops. Abdomen: Soft, non-tender, non-distended with normal bowel sounds. Musculoskeletal: No clubbing, cyanosis or edema bilaterally. Full range of motion without deformity. Skin: Skin color, texture, turgor normal.  No rashes or lesions. Neurologic:  Abnormal sensation below the knee bilaterally. 3/5 weakness BLE.    Psychiatric: Alert and oriented, thought content appropriate, normal insight  Capillary Refill: Brisk,< 3 seconds   Peripheral Pulses: +2 palpable, equal bilaterally       Labs:   Recent Labs     12/15/20  1147 12/16/20  0833 12/17/20  1217   WBC 11.6* 10.3 9.8   HGB 9.6* 10.1* 9.6*   HCT 29.7* 31.1* 29.8*    275 260     Recent Labs     12/15/20  1147 12/16/20  0833 12/17/20  1217   * 130* 128*   K 5.0 5.1 5.2*   CL 95* 92* 93*   CO2 24 26 25   BUN 29* 40* 33*   CREATININE 5.5* 6.6* 5.0*   CALCIUM 9.3 9.6 9.5     No results for input(s): AST, ALT, BILIDIR, BILITOT, ALKPHOS in the last 72 hours. No results for input(s): INR in the last 72 hours. No results for input(s): Apoorva Hess in the last 72 hours. Urinalysis:      Lab Results   Component Value Date    NITRU Negative 06/05/2020    WBCUA 10-20 06/05/2020    BACTERIA 2+ 06/05/2020    RBCUA 0-2 06/05/2020    BLOODU TRACE-INTACT 06/05/2020    SPECGRAV 1.020 06/05/2020    GLUCOSEU 250 06/05/2020       Radiology:  IR LUMBAR PUNCTURE FOR DIAGNOSIS   Final Result   Successful fluoroscopic-guided lumbar puncture. CT ABDOMEN PELVIS W IV CONTRAST Additional Contrast? Radiologist Recommendation   Final Result   No acute abdominopelvic abnormality. MRI THORACIC SPINE WO CONTRAST   Final Result   No acute thoracic spine abnormality. No thoracic spinal canal or neural   foraminal stenosis. MRI CERVICAL SPINE WO CONTRAST   Final Result   No acute cervical spine abnormality. Mild degenerative spondylosis without significant cervical spinal canal   stenosis or high-grade neural foraminal stenosis. Mild left C4-C5 and right C6-C7 neural foraminal stenosis. MRI LUMBAR SPINE WO CONTRAST   Final Result   1. Mild L1-2 and L5-S1 degenerative disc height loss. 2. Moderate right L5 neural foraminal narrowing secondary to a right   foraminal L5-S1 disc protrusion. 3. Mild L1-2 spinal canal stenosis. CT ABDOMEN PELVIS WO CONTRAST   Final Result   1. No CT evidence of an acute intra-abdominal or intrapelvic process. 2.  No findings to suggest acute appendicitis; no ureter calculus or   hydronephrosis. 3. Probably reactive lymph nodes in the right upper quadrant, unchanged from   prior study. 4.  Mild extrahepatic bile duct dilatation status post cholecystectomy   typical of reservoir effect. 5.  Calcific atherosclerotic disease aorta. 6. Small, fat containing umbilical hernia. 7. Benign left adrenal adenoma requires no additional evaluation or follow-up.                  Assessment/Plan:    Active Hospital Problems    Diagnosis    S/P TAVR (transcatheter aortic valve replacement) [Z95.2]     Priority: Medium    Sinus pause [I45.5]    GBS (Guillain-[place] syndrome) ([facility]) [G61.0]    Bilateral leg weakness [R29.898]    ESRD (end stage renal disease) on [facility] ([facility] [N18.6, Z99.2]    DM (diabetes mellitus), secondary, uncontrolled, w/neurologic complic ([facility] [F39.56, D62.89]    Nonischemic cardiomyopathy ([facility] [I42.8]     Bilateral lower extremity weakness numbness - unclear etiology. Exam seems inconsistent.  - MRI shows L5-S1 disc protrusion.  - discussed with Ortho Spine - recommended MRI cervical and thoracic spine to rule out epidural abscess, given elevated ESR and CRP. This was WNL  - neurology consulted and now signed off.  - s/p LP 12/11/2020, which was unrevealing.     ESRD on HD   - nephrology consulted and following.  - MWF HD     Chronic back pain   - continue home medication and muscle relaxant  - increase to Percocet from 7.5 to 10mg/325.   - follows with pain management as outpatient.     DM2 with neuropathy  - continue Lantus and medium dose SSI. - continue gabapentin and Cymbalta. Hyponatremia   - nephrology following.  - hold Celexa.     Anemia - likely due to ESRD.     Chronic diastolic CHF - no evidence of fluid overload     COPD - stable. Continue home inhalers     CAD -  Continue Pravachol and BB. Continue to hold ASA and Plavix overnight in case of need for pacer. Bradycardia with pauses  - cardiology consulted and following.  - discontinue diltiazem. Resume Coreg at 6.25 mg.    - EP consulted for possible pacer placement    Constipation - likely due to immobility and narcotic use. - bowel regimen in place. DVT Prophylaxis: Subcutaneous Heparin  Diet: DIET RENAL; Daily Fluid Restriction: 1000 ml  Dietary Nutrition Supplements: Diabetic Oral Supplement  Code Status: Full Code    PT/OT Eval Status: recommend ARU, but insurance denied.   Planning for EGS    Dispo - medically stable for dc, but am having difficulty obtaining placement    Louann Saha, APRN - CNP

## 2020-12-18 NOTE — PROGRESS NOTES
Report given to Ellen Mckeon. Call light and bedside table within reach. No needs voiced at this time. Bed in lowest position, brakes locked. Bed alarm on and in place.

## 2020-12-18 NOTE — PROGRESS NOTES
Hospitalist Progress Note      PCP: Danielle Peoples MD    Date of Admission: 12/4/2020    Chief Complaint: Bilateral lower extremity weakness, numbness, and inability to walk for 2 days. Hospital Course: 44 y.o. male who presented to [facility] with above complaint. He has a history of diabetes and neuropathy. He does have lower back pain for long time. He has been feeling more numbness for last 2 days and today he could not get up and walk. He felt like he does not have leg. He fell couple of times and there is no apparent injuries. Currently he does not feel below the mid calf bilateral lower extremities. And he is able to move some extent lower extremities but unable to walk. Subjective: Bowels finally moved. Feels better. Waiting for insurance approval for transportation to .     Medications:  Reviewed    Infusion Medications   Scheduled Medications    lactulose  20 g Oral 6 times per day    docusate sodium  100 mg Oral Daily    polyethylene glycol  17 g Oral BID    pantoprazole  40 mg Oral BID AC    carvedilol  6.25 mg Oral BID WC    darbepoetin siddharth-polysorbate  25 mcg Intravenous Q7 Days    lidocaine  1 patch Transdermal Daily    insulin lispro  0-12 Units Subcutaneous TID WC    insulin lispro  0-6 Units Subcutaneous Nightly    aspirin  81 mg Oral Daily    clopidogrel  75 mg Oral Daily    DULoxetine  30 mg Oral Daily    gabapentin  100 mg Oral TID    insulin glargine  70 Units Subcutaneous Nightly    isosorbide mononitrate  30 mg Oral Daily    linaclotide  145 mcg Oral QAM AC    losartan  50 mg Oral Daily    pravastatin  80 mg Oral Nightly    pregabalin  25 mg Oral TID    QUEtiapine  50 mg Oral QPM    glycopyrrolate-formoterol  2 puff Inhalation BID    tiZANidine  2 mg Oral TID    torsemide  100 mg Oral Daily    traZODone  150 mg Oral Nightly    sodium chloride flush  10 mL Intravenous 2 times per day    heparin (porcine)  5,000 Units Subcutaneous 3 times 72 hours. No results for input(s): INR in the last 72 hours. No results for input(s): Sirisha Harsh in the last 72 hours. Urinalysis:      Lab Results   Component Value Date    NITRU Negative 06/05/2020    WBCUA 10-20 06/05/2020    BACTERIA 2+ 06/05/2020    RBCUA 0-2 06/05/2020    BLOODU TRACE-INTACT 06/05/2020    SPECGRAV 1.020 06/05/2020    GLUCOSEU 250 06/05/2020       Radiology:  IR LUMBAR PUNCTURE FOR DIAGNOSIS   Final Result   Successful fluoroscopic-guided lumbar puncture. CT ABDOMEN PELVIS W IV CONTRAST Additional Contrast? Radiologist Recommendation   Final Result   No acute abdominopelvic abnormality. MRI THORACIC SPINE WO CONTRAST   Final Result   No acute thoracic spine abnormality. No thoracic spinal canal or neural   foraminal stenosis. MRI CERVICAL SPINE WO CONTRAST   Final Result   No acute cervical spine abnormality. Mild degenerative spondylosis without significant cervical spinal canal   stenosis or high-grade neural foraminal stenosis. Mild left C4-C5 and right C6-C7 neural foraminal stenosis. MRI LUMBAR SPINE WO CONTRAST   Final Result   1. Mild L1-2 and L5-S1 degenerative disc height loss. 2. Moderate right L5 neural foraminal narrowing secondary to a right   foraminal L5-S1 disc protrusion. 3. Mild L1-2 spinal canal stenosis. CT ABDOMEN PELVIS WO CONTRAST   Final Result   1. No CT evidence of an acute intra-abdominal or intrapelvic process. 2.  No findings to suggest acute appendicitis; no ureter calculus or   hydronephrosis. 3. Probably reactive lymph nodes in the right upper quadrant, unchanged from   prior study. 4.  Mild extrahepatic bile duct dilatation status post cholecystectomy   typical of reservoir effect. 5.  Calcific atherosclerotic disease aorta. 6. Small, fat containing umbilical hernia. 7. Benign left adrenal adenoma requires no additional evaluation or follow-up. Assessment/Plan:    Active Hospital Problems    Diagnosis    S/P TAVR (transcatheter aortic valve replacement) [Z95.2]     Priority: Medium    Sinus pause [I45.5]    GBS (Guillain-[place] syndrome) ([facility]) [G61.0]    Bilateral leg weakness [R29.898]    ESRD (end stage renal disease) on dialysis ([facility] [N18.6, Z99.2]    DM (diabetes mellitus), secondary, uncontrolled, w/neurologic complic ([facility] [F05.17, Z04.54]    Nonischemic cardiomyopathy ([facility] [I42.8]     Bilateral lower extremity weakness numbness - unclear etiology. Exam seems inconsistent.  - MRI shows L5-S1 disc protrusion.  - discussed with Ortho Spine - recommended MRI cervical and thoracic spine to rule out epidural abscess, given elevated ESR and CRP. This was WNL  - neurology consulted and now signed off.  - s/p LP 12/11/2020, which was unrevealing.     ESRD on HD   - nephrology consulted and following.  - MWF HD     Chronic back pain   - continue home medication and muscle relaxant  - increase to Percocet from 7.5 to 10mg/325.   - follows with pain management as outpatient.     DM2 with neuropathy  - continue Lantus and medium dose SSI. - continue gabapentin and Cymbalta. Hyponatremia   - nephrology following.  - hold Celexa.     Anemia - likely due to ESRD.     Chronic diastolic CHF - no evidence of fluid overload     COPD - stable. Continue home inhalers     CAD -  Continue Pravachol and BB. Continue to hold ASA and Plavix overnight in case of need for pacer. Bradycardia with pauses  - cardiology consulted and following.  - discontinue diltiazem. Resume Coreg at 6.25 mg.    - EP consulted for possible pacer placement    Constipation - likely due to immobility and narcotic use. - bowel regimen in place. DVT Prophylaxis: Subcutaneous Heparin  Diet: DIET RENAL; Daily Fluid Restriction: 1000 ml  Dietary Nutrition Supplements: Diabetic Oral Supplement  Code Status: Full Code    PT/OT Eval Status: recommend ARU, but insurance denied. Planning for EGS    Dispo - medically stable for dc, but am having difficulty obtaining placement    Erica Davalos, APRN - CNP

## 2020-12-18 NOTE — DISCHARGE INSTR - COC
Continuity of Care Form    Patient Name: Michael Disla   :  1970  MRN:  [id number]    Admit date:  2020  Discharge date:  20    Code Status Order: Full Code   Advance Directives:   [facility] Documentation       Date/Time Healthcare Directive Type of Healthcare Directive Copy in [address] Agent's Name Healthcare Agent's Phone Number    [phone]  Yes, patient has an advance directive for healthcare treatment  Living will;Durable power of  for health care  Yes, copy in chart  Healthcare power of   Maeve Singletary  [phone]            Admitting Physician:  Jake Aguayo MD  PCP: Serenity Viera MD    Discharging Nurse: [facility] Unit/Room#: 3836/2876-62  Discharging Unit Phone Number: [phone]    Emergency Contact:   Extended Emergency Contact Information  Primary Emergency Contact: Crystal Ann  Address: [facility] [address] [facility], [address] [facility] Phone: [phone]  Mobile Phone: [phone]  Relation: Spouse    Past Surgical History:  Past Surgical History:   Procedure Laterality Date    AORTIC VALVE REPLACEMENT N/A 10/15/2019    TRANSCATHETER AORTIC VALVE REPLACEMENT FEMORAL APPROACH performed by Vida Styles MD at [facility] Right 2019    PERITONEAL DIALYSIS CATHETER REMOVAL performed by Salbador Berumen MD at [address] [place] [address]      WNL    CORONARY ANGIOPLASTY WITH STENT PLACEMENT  05/26/15    CYST REMOVAL  2013    EXCISION CYSTS, NECK X2 AND ABDOMINAL benign    DIAGNOSTIC CARDIAC CATH LAB PROCEDURE      DIALYSIS FISTULA CREATION Left 10/30/2017    LEFT BRACHIAL CEPHALIC FISTULA    DIALYSIS FISTULA CREATION Left 3/27/2019    LIGATION  AV FISTULA performed by Getachew Alford MD at [address] [place] [address], DIAGNOSTIC      OTHER SURGICAL HISTORY 02/01/2017    laparoscopic cholecystectomy with intraoperative cholangiogram    OTHER SURGICAL HISTORY  2018    PORT PLACEMENT  - vas cath    OTHER SURGICAL HISTORY Bilateral 06/26/2018    laprascopic peritoneal dialysis catheter placement    OTHER SURGICAL HISTORY Right 09/2018    peritoneal dialysis port placed on right side of abdomen    OTHER SURGICAL HISTORY  05/28/2019    PTA/Stenting R External Iliac artery    DE LAP INSERTION TUNNELED INTRAPERITONEAL CATHETER N/A 9/21/2018    LAPAROSCOPIC PERITONEAL DIALYSIS CATHETER REPLACEMENT performed by Eloise Cruz MD at [facility] ENDOSCOPY  01/06/2016    UPPER GASTROINTESTINAL ENDOSCOPY  01/29/2017    possible candida, otherwise normal appearing    VASCULAR SURGERY  aprx 2 years ago    2 stents placed, each side of groin       Immunization History:   Immunization History   Administered Date(s) Administered    Hepatitis B Adult (Engerix-B) 11/18/2017, 06/13/2018, 07/13/2018    Influenza Virus Vaccine 12/27/2012, 10/07/2014, 11/20/2015, 10/16/2019    Influenza, Intradermal, Quadrivalent, Preservative Free 11/16/2016    Influenza, MDCK Quadv, IM, PF (Flucelvax 4 yrs and older) 10/14/2017, 09/30/2020    Influenza, Cathren Maci, 6 mo and older, IM, PF (Flulaval, Fluarix) 09/15/2018    Influenza, Quadv, IM, PF (6 mo and older Fluzone, Flulaval, Fluarix, and 3 yrs and older Afluria) 10/16/2019    PPD Test 11/09/2017, 11/16/2017, 01/03/2019, 01/06/2020    Pneumococcal Conjugate 13-valent ([id number]) 10/14/2017    Pneumococcal Polysaccharide (Fofrcxohz74) 10/07/2014, 11/19/2019    Tdap (Boostrix, Adacel) 02/13/2020    Zoster Recombinant (Shingrix) 02/13/2020       Active Problems:  Patient Active Problem List   Diagnosis Code    Acute respiratory failure with hypoxia and hypercapnia (HCC) J96.01, J96.02    Chronic dCHF (grade 2 LVDD) I50.9    Chronic obstructive pulmonary disease (HCC) J44.9    CAD (coronary artery disease) I25.10    PVD (peripheral vascular disease) ([facility]) I73.9    Nonischemic cardiomyopathy ([facility]) I42.8    Sleep apnea G47.30    Bicuspid aortic valve Q23.1    Bilateral hilar adenopathy syndrome R59.0    Claudication in peripheral vascular disease (HCC) I73.9    Essential hypertension I10    Diabetic neuropathy (HCC) E11.40    Type 2 diabetes, uncontrolled, with neuropathy (HCC) E11.40, E11.65    Passive smoke exposure Z77.22    Depression with anxiety F41.8    Hematoma T14. 8XXA    Pneumonia of right upper lobe due to infectious organism J18.9    DM (diabetes mellitus), secondary, uncontrolled, w/neurologic complic ([facility] X36.27, T13.48    Hypertensive heart disease with congestive heart failure with preserved left ventricular function ([facility]) I11.0, I50.30    CHF exacerbation ([facility]) I50.9    Chest pain R07.9    Coronary artery disease involving native coronary artery of native heart without angina pectoris I25.10    Obesity (BMI 30-39. 9) E66.9    ZAINAB on CPAP G47.33, Z99.89    Degeneration of lumbar or lumbosacral intervertebral disc M51.37    Lumbar radiculopathy M54.16    Lumbosacral spondylosis without myelopathy O96.190    Biliary colic M43.57    Symptomatic cholelithiasis K80.20    Gastroparesis due to DM (HCC) E11.43, K31.84    Angina, class IV ([facility]) I20.9    Dyspnea R06.00    Elevated brain natriuretic peptide (BNP) level R79.89    Dyslipidemia E78.5    Respiratory distress R06.03    Hypoxia R09.02    Chest pressure R07.89    Hypertensive urgency I16.0    Acute on chronic combined systolic and diastolic heart failure (HCC) I50.43    Ischemic cardiomyopathy I25.5    Chronic renal failure, stage 5 ([facility]) N18.5    Tobacco abuse Z72.0    CKD stage 4 due to type 2 diabetes mellitus (HCC) E11.22, N18.4    CVA (cerebral vascular accident) ([facility] I63.9    Arterial ischemic stroke, ICA, right, acute ([facility]) I63.231    Type 2 diabetes mellitus without complication, without long-term current use of insulin ([facility]) E11.9    HTN (hypertension), benign I10    ZAINAB (obstructive sleep apnea) G47.33    Diarrhea R19.7    Pleural effusion J90    Chronic anemia D64.9    Nonrheumatic aortic valve stenosis I35.0    Hypervolemia E87.70    Hyperkalemia E87.5    Mucus plugging of bronchi J98.09    Hemodialysis-associated hypotension I95.3    Left arm pain M79.602    Dizziness R42    ESRD (end stage renal disease) on dialysis ([facility]) N18.6, Z99.2    Hypotension due to drugs I95.2    Acute diastolic CHF (congestive heart failure) ([facility]) I50.31    Neuromuscular disorder ([facility]) G70.9    Acute combined systolic and diastolic CHF, NYHA class 4 ([facility]) I50.41    Renovascular hypertension I15.0    Mixed hyperlipidemia E78.2    Cigarette nicotine dependence in remission F17.211    Acute respiratory failure ([facility]) J96.00    Pulmonary edema J81.1    Fluid overload Q90.91    Diastolic dysfunction U33.43    Anemia of chronic disease D63.8    SOB (shortness of breath) R06.02    Steal syndrome of dialysis vascular access ([facility]) T82.898A    Chronic, continuous use of opioids F11.90    Chronic bronchitis ([facility]) J42    Nasal congestion R09.81    Hypercholesteremia E78.00    Bradycardia R00.1    S/P TAVR (transcatheter aortic valve replacement) Z95.2    Syncope and collapse R55    Atrial fibrillation ([facility] I48.91    Former smoker Z87.891    Generalized weakness R53.1    DKA, type 1, not at goal [facility]) E10.10    Bilateral leg weakness R29.898    GBS (Guillain-[place] syndrome) ([facility]) G61.0    Sinus pause I45.5       Isolation/Infection:   Isolation            No Isolation          Patient Infection Status       Infection Onset Added Last Indicated Last Indicated By Review Planned Expiration Resolved Resolved By    COVID-19 Rule Out 12/18/20 12/18/20 12/18/20 COVID-19 (Ordered) 12/25/20 01/01/21      Resolved    COVID-19 Rule Out 05/04/20 05/04/20 05/04/20 COVID-19 (Ordered)   05/04/20 Rule-Out Test Resulted            Nurse Assessment:  Last Vital Signs: /88   Pulse 76   Temp 97.4 °F (36.3 °C) (Axillary)   Resp 18   Ht 5' 9\" (1.753 m)   Wt 257 lb 11.5 oz (116.9 kg)   SpO2 99%   BMI 38.06 kg/m²     Last documented pain score (0-10 scale): Pain Level: 9  Last Weight:   Wt Readings from Last 1 Encounters:   12/18/20 257 lb 11.5 oz (116.9 kg)     Mental Status:  oriented, alert, coherent, logical, thought processes intact and able to concentrate and follow conversation    IV Access:  - Dialysis Catheter  - site  left and subclavian, insertion date: 10/14/19    Nursing Mobility/ADLs:  Walking   Independent  Transfer  Independent  Bathing  Independent  Dressing  Independent  Toileting  Independent  Feeding  Independent  Med [address]  Assisted  Med Delivery   whole    Wound Care Documentation and Therapy:        Elimination:  Continence:   · Bowel: Yes  · Bladder: Yes  Urinary Catheter: None   Colostomy/Ileostomy/Ileal Conduit: No       Date of Last BM: 12/19/20    Intake/Output Summary (Last 24 hours) at 12/18/2020 1431  Last data filed at 12/18/2020 1305  Gross per 24 hour   Intake 1380 ml   Output 4000 ml   Net -2620 ml     I/O last 3 completed shifts:   In: 1080 [P.O.:1080]  Out: 0     Safety Concerns:     History of Falls (last 30 days) and At Risk for Falls    Impairments/Disabilities:      None    Nutrition Therapy:  Current Nutrition Therapy:   - Oral Diet:  Renal  - Oral Nutrition Supplement:  Diabetic  twice a day    Routes of Feeding: Oral  Liquids: No Restrictions  Daily Fluid Restriction: yes - amount 1000mL  Last Modified Barium Swallow with Video (Video Swallowing Test): not done    Treatments at the Time of Hospital Discharge:   Respiratory Treatments:   albuterol sulfate  (90 Base) MCG/ACT inhaler 2 puff  :  Dose 2 puff  :  Inhalation  :  EVERY 6 HOURS PRN  :  Wheezing         glycopyrrolate-formoterol (BEVESPI) 9-4.8 MCG/ACT inhaler 2 puff  :  Dose 2 puff  :  Inhalation  :  2 TIMES DAILY          Oxygen Therapy:  is not on home oxygen therapy.   Ventilator:    - CPAP   only when sleeping    Rehab Therapies: Nusre, Physical Therapy and Occupational Therapy  Weight Bearing Status/Restrictions: No weight bearing restirctions  Other Medical Equipment (for information only, NOT a DME order):  none  Other Treatments: Dialysis Monday Wednesday and Friday    HOME HEALTH CARE: LEVEL 3 SAFETY     Home health agency to establish plan of care for patient over 60 day period   Nursing  Initial home SN evaluation visit to occur within 24-48 hours for:  1)  medication management  2)  VS and clinical assessment  3)  S&S chronic disease exacerbation education + when to contact MD/NP  4)  care coordination  Medication Reconciliation during 1st SN visit  PT/OT/Speech   Evaluations in home within 24-48 hours of discharge to include DME and home safety   Frontload therapy 5 days, then 3x a week   OT to evaluate if patient has [address] needs for personal care    evaluation within 24-48 hours to evaluate resources & insurance for potential AL, [place], LTC, and Medicaid options   Palliative Care referral within 5 days of hospital discharge   PCP Visit scheduled within 3 - 7 days of hospital discharge [facility] [address] (If patient is agreeable and meets guidelines)      Patient's personal belongings (please select all that are sent with patient):  clothes    RN SIGNATURE:  Electronically signed by Alexis Montes RN on 12/21/20 at 10:29 AM EST    CASE MANAGEMENT/SOCIAL WORK SECTION    Inpatient Status Date: 12/4/20    Readmission Risk Assessment Score:  Readmission Risk              Risk of Unplanned Readmission:        54           Discharging to Facility/ Agency   · Name: [address]  · Address:  · Phone:[phone]  · Fax:[phone]    Dialysis Facility (if applicable)   · Name:[facility]  · Address:  · Dialysis Schedule:M-W-F  · Phone:[phone]  · Fax:[phone]    / signature: Electronically signed by Ave Padilla RN on 12/21/20 at 11:17 AM EST    PHYSICIAN SECTION    Prognosis: Good    Condition at Discharge: Stable    Rehab Potential (if transferring to Rehab): Good    Recommended Labs or Other Treatments After Discharge: Nursing care/PT/OT    Physician Certification: I certify the above information and transfer of Anh Coronado  is necessary for the continuing treatment of the diagnosis listed and that he requires Home Care for less 30 days.      Update Admission H&P: No change in H&P    PHYSICIAN SIGNATURE:  Electronically signed by JAY Goyal CNP on 12/18/20 at 2:32 PM EST

## 2020-12-18 NOTE — PROGRESS NOTES
Monitor company [employer]  Length of monitor 14 days  Monitor ordered by Morelia Encinas CNP  Code: [id number] Monitor ID: [id number]     Monitor placed at A 1 nurses station. Nurse will activate at the time of discharge.

## 2020-12-18 NOTE — CARE COORDINATION
Received a call from [address] [place] [address]) regarding transport from SNF ([facility]) to HD treatments. Waiver will not cover transport while he is inpatient at [facility]. [address] [place] [address] has two Supervisors at [employer] all transport possibilities. If transport cannot be arranged for HD, patient may have to consider [facility] as possible SNF choice. Will revisit this with him when we have a transport decision.       Saurabh Parsons RN

## 2020-12-19 LAB
ANION GAP SERPL CALCULATED.3IONS-SCNC: 10 MMOL/L (ref 3–16)
BANDED NEUTROPHILS RELATIVE PERCENT: 2 % (ref 0–7)
BASOPHILS ABSOLUTE: 0.1 K/UL (ref 0–0.2)
BASOPHILS RELATIVE PERCENT: 1 %
BUN BLDV-MCNC: 30 MG/DL (ref 7–20)
CALCIUM SERPL-MCNC: 9.4 MG/DL (ref 8.3–10.6)
CHLORIDE BLD-SCNC: 95 MMOL/L (ref 99–110)
CO2: 25 MMOL/L (ref 21–32)
CREAT SERPL-MCNC: 5 MG/DL (ref 0.9–1.3)
EOSINOPHILS ABSOLUTE: 0 K/UL (ref 0–0.6)
EOSINOPHILS RELATIVE PERCENT: 0 %
GFR AFRICAN AMERICAN: 15
GFR NON-AFRICAN AMERICAN: 12
GLUCOSE BLD-MCNC: 122 MG/DL (ref 70–99)
GLUCOSE BLD-MCNC: 124 MG/DL (ref 70–99)
GLUCOSE BLD-MCNC: 125 MG/DL (ref 70–99)
GLUCOSE BLD-MCNC: 135 MG/DL (ref 70–99)
GLUCOSE BLD-MCNC: 189 MG/DL (ref 70–99)
HCT VFR BLD CALC: 30.5 % (ref 40.5–52.5)
HEMOGLOBIN: 10.1 G/DL (ref 13.5–17.5)
LYMPHOCYTES ABSOLUTE: 0.7 K/UL (ref 1–5.1)
LYMPHOCYTES RELATIVE PERCENT: 7 %
MACROCYTES: ABNORMAL
MCH RBC QN AUTO: 29.8 PG (ref 26–34)
MCHC RBC AUTO-ENTMCNC: 33 G/DL (ref 31–36)
MCV RBC AUTO: 90.3 FL (ref 80–100)
MONOCYTES ABSOLUTE: 0.4 K/UL (ref 0–1.3)
MONOCYTES RELATIVE PERCENT: 4 %
NEUTROPHILS ABSOLUTE: 9 K/UL (ref 1.7–7.7)
NEUTROPHILS RELATIVE PERCENT: 86 %
PDW BLD-RTO: 14.1 % (ref 12.4–15.4)
PERFORMED ON: ABNORMAL
PLATELET # BLD: 289 K/UL (ref 135–450)
PLATELET SLIDE REVIEW: ADEQUATE
PMV BLD AUTO: 7.4 FL (ref 5–10.5)
POTASSIUM REFLEX MAGNESIUM: 4.7 MMOL/L (ref 3.5–5.1)
RBC # BLD: 3.38 M/UL (ref 4.2–5.9)
SLIDE REVIEW: ABNORMAL
SODIUM BLD-SCNC: 130 MMOL/L (ref 136–145)
WBC # BLD: 10.2 K/UL (ref 4–11)

## 2020-12-19 PROCEDURE — 36415 COLL VENOUS BLD VENIPUNCTURE: CPT

## 2020-12-19 PROCEDURE — 6370000000 HC RX 637 (ALT 250 FOR IP): Performed by: INTERNAL MEDICINE

## 2020-12-19 PROCEDURE — 1200000000 HC SEMI PRIVATE

## 2020-12-19 PROCEDURE — 6370000000 HC RX 637 (ALT 250 FOR IP): Performed by: NURSE PRACTITIONER

## 2020-12-19 PROCEDURE — 85025 COMPLETE CBC W/AUTO DIFF WBC: CPT

## 2020-12-19 PROCEDURE — 6360000002 HC RX W HCPCS: Performed by: INTERNAL MEDICINE

## 2020-12-19 PROCEDURE — 94660 CPAP INITIATION&MGMT: CPT

## 2020-12-19 PROCEDURE — 80048 BASIC METABOLIC PNL TOTAL CA: CPT

## 2020-12-19 PROCEDURE — 94640 AIRWAY INHALATION TREATMENT: CPT

## 2020-12-19 PROCEDURE — 2700000000 HC OXYGEN THERAPY PER DAY

## 2020-12-19 RX ADMIN — CARVEDILOL 6.25 MG: 6.25 TABLET, FILM COATED ORAL at 17:08

## 2020-12-19 RX ADMIN — GABAPENTIN 100 MG: 100 CAPSULE ORAL at 13:05

## 2020-12-19 RX ADMIN — PANTOPRAZOLE SODIUM 40 MG: 40 TABLET, DELAYED RELEASE ORAL at 17:08

## 2020-12-19 RX ADMIN — TIZANIDINE 2 MG: 4 TABLET ORAL at 09:57

## 2020-12-19 RX ADMIN — OXYCODONE HYDROCHLORIDE AND ACETAMINOPHEN 1 TABLET: 10; 325 TABLET ORAL at 13:04

## 2020-12-19 RX ADMIN — PANTOPRAZOLE SODIUM 40 MG: 40 TABLET, DELAYED RELEASE ORAL at 06:51

## 2020-12-19 RX ADMIN — PRAVASTATIN SODIUM 80 MG: 80 TABLET ORAL at 21:23

## 2020-12-19 RX ADMIN — DOXYCYCLINE HYCLATE 100 MG: 100 TABLET, COATED ORAL at 21:22

## 2020-12-19 RX ADMIN — PREGABALIN 25 MG: 25 CAPSULE ORAL at 13:05

## 2020-12-19 RX ADMIN — HEPARIN SODIUM 5000 UNITS: 5000 INJECTION INTRAVENOUS; SUBCUTANEOUS at 06:51

## 2020-12-19 RX ADMIN — CLOPIDOGREL BISULFATE 75 MG: 75 TABLET ORAL at 09:57

## 2020-12-19 RX ADMIN — TIZANIDINE 2 MG: 4 TABLET ORAL at 13:05

## 2020-12-19 RX ADMIN — LOSARTAN POTASSIUM 50 MG: 25 TABLET, FILM COATED ORAL at 10:06

## 2020-12-19 RX ADMIN — TIZANIDINE 2 MG: 4 TABLET ORAL at 21:23

## 2020-12-19 RX ADMIN — PREGABALIN 25 MG: 25 CAPSULE ORAL at 21:22

## 2020-12-19 RX ADMIN — CARVEDILOL 6.25 MG: 6.25 TABLET, FILM COATED ORAL at 10:07

## 2020-12-19 RX ADMIN — INSULIN GLARGINE 70 UNITS: 100 INJECTION, SOLUTION SUBCUTANEOUS at 21:23

## 2020-12-19 RX ADMIN — TRAZODONE HYDROCHLORIDE 150 MG: 50 TABLET ORAL at 21:22

## 2020-12-19 RX ADMIN — ISOSORBIDE MONONITRATE 30 MG: 30 TABLET, EXTENDED RELEASE ORAL at 10:07

## 2020-12-19 RX ADMIN — OXYCODONE HYDROCHLORIDE AND ACETAMINOPHEN 1 TABLET: 10; 325 TABLET ORAL at 06:51

## 2020-12-19 RX ADMIN — DULOXETINE HYDROCHLORIDE 30 MG: 30 CAPSULE, DELAYED RELEASE ORAL at 09:57

## 2020-12-19 RX ADMIN — PREGABALIN 25 MG: 25 CAPSULE ORAL at 09:57

## 2020-12-19 RX ADMIN — ASPIRIN 81 MG CHEWABLE TABLET 81 MG: 81 TABLET CHEWABLE at 09:57

## 2020-12-19 RX ADMIN — DOXYCYCLINE HYCLATE 100 MG: 100 TABLET, COATED ORAL at 09:57

## 2020-12-19 RX ADMIN — GABAPENTIN 100 MG: 100 CAPSULE ORAL at 09:57

## 2020-12-19 RX ADMIN — HEPARIN SODIUM 5000 UNITS: 5000 INJECTION INTRAVENOUS; SUBCUTANEOUS at 13:05

## 2020-12-19 RX ADMIN — GLYCOPYRROLATE AND FORMOTEROL FUMARATE 2 PUFF: 9; 4.8 AEROSOL, METERED RESPIRATORY (INHALATION) at 09:17

## 2020-12-19 RX ADMIN — TORSEMIDE 100 MG: 100 TABLET ORAL at 09:57

## 2020-12-19 RX ADMIN — GABAPENTIN 100 MG: 100 CAPSULE ORAL at 21:22

## 2020-12-19 RX ADMIN — HEPARIN SODIUM 5000 UNITS: 5000 INJECTION INTRAVENOUS; SUBCUTANEOUS at 21:23

## 2020-12-19 RX ADMIN — QUETIAPINE FUMARATE 50 MG: 25 TABLET ORAL at 17:10

## 2020-12-19 RX ADMIN — OXYCODONE HYDROCHLORIDE AND ACETAMINOPHEN 1 TABLET: 10; 325 TABLET ORAL at 21:27

## 2020-12-19 RX ADMIN — INSULIN LISPRO 2 UNITS: 100 INJECTION, SOLUTION INTRAVENOUS; SUBCUTANEOUS at 12:25

## 2020-12-19 ASSESSMENT — PAIN SCALES - GENERAL
PAINLEVEL_OUTOF10: 8
PAINLEVEL_OUTOF10: 9

## 2020-12-19 NOTE — PROGRESS NOTES
heparin (porcine)  5,000 Units Subcutaneous 3 times per day     PRN Meds: simethicone, prochlorperazine, oxyCODONE-acetaminophen, calcium carbonate, albuterol sulfate HFA, heparin (porcine), sodium chloride flush, acetaminophen **OR** acetaminophen, promethazine **OR** ondansetron      Intake/Output Summary (Last 24 hours) at 12/19/2020 0935  Last data filed at 12/18/2020 1934  Gross per 24 hour   Intake 1940 ml   Output 4000 ml   Net -2060 ml       Physical Exam Performed:    BP (!) 90/46   Pulse 78   Temp 97.5 °F (36.4 °C) (Oral)   Resp 18   Ht 5' 9\" (1.753 m)   Wt 257 lb 0.9 oz (116.6 kg)   SpO2 93%   BMI 37.96 kg/m²     General appearance: No apparent distress, appears stated age and cooperative. HEENT: Pupils equal, round, and reactive to light. Conjunctivae/corneas clear. Neck: Supple, with full range of motion. No jugular venous distention. Trachea midline. Respiratory:  Normal respiratory effort. Clear to auscultation, bilaterally without Rales/Wheezes/Rhonchi. Cardiovascular: Regular rate and rhythm with normal S1/S2 without murmurs, rubs or gallops. Abdomen: Soft, non-tender, non-distended with normal bowel sounds. Musculoskeletal: No clubbing, cyanosis or edema bilaterally. Full range of motion without deformity. Skin: Skin color, texture, turgor normal.  No rashes or lesions. Neurologic:  Abnormal sensation below the knee bilaterally. 3/5 weakness BLE.    Psychiatric: Alert and oriented, thought content appropriate, normal insight  Capillary Refill: Brisk,< 3 seconds   Peripheral Pulses: +2 palpable, equal bilaterally       Labs:   Recent Labs     12/17/20  1217 12/18/20  0920 12/19/20  0856   WBC 9.8 9.5 10.2   HGB 9.6* 9.4* 10.1*   HCT 29.8* 28.2* 30.5*    263 289     Recent Labs     12/17/20  1217 12/18/20  0920   * 127*   K 5.2* 4.6   CL 93* 90*   CO2 25 27   BUN 33* 42*   CREATININE 5.0* 6.3*   CALCIUM 9.5 9.4     No results for input(s): AST, ALT, BILIDIR, BILITOT, ALKPHOS in the last 72 hours. No results for input(s): INR in the last 72 hours. No results for input(s): Francies Gathers in the last 72 hours. Urinalysis:      Lab Results   Component Value Date    NITRU Negative 06/05/2020    WBCUA 10-20 06/05/2020    BACTERIA 2+ 06/05/2020    RBCUA 0-2 06/05/2020    BLOODU TRACE-INTACT 06/05/2020    SPECGRAV 1.020 06/05/2020    GLUCOSEU 250 06/05/2020       Radiology:  IR LUMBAR PUNCTURE FOR DIAGNOSIS   Final Result   Successful fluoroscopic-guided lumbar puncture. CT ABDOMEN PELVIS W IV CONTRAST Additional Contrast? Radiologist Recommendation   Final Result   No acute abdominopelvic abnormality. MRI THORACIC SPINE WO CONTRAST   Final Result   No acute thoracic spine abnormality. No thoracic spinal canal or neural   foraminal stenosis. MRI CERVICAL SPINE WO CONTRAST   Final Result   No acute cervical spine abnormality. Mild degenerative spondylosis without significant cervical spinal canal   stenosis or high-grade neural foraminal stenosis. Mild left C4-C5 and right C6-C7 neural foraminal stenosis. MRI LUMBAR SPINE WO CONTRAST   Final Result   1. Mild L1-2 and L5-S1 degenerative disc height loss. 2. Moderate right L5 neural foraminal narrowing secondary to a right   foraminal L5-S1 disc protrusion. 3. Mild L1-2 spinal canal stenosis. CT ABDOMEN PELVIS WO CONTRAST   Final Result   1. No CT evidence of an acute intra-abdominal or intrapelvic process. 2.  No findings to suggest acute appendicitis; no ureter calculus or   hydronephrosis. 3. Probably reactive lymph nodes in the right upper quadrant, unchanged from   prior study. 4.  Mild extrahepatic bile duct dilatation status post cholecystectomy   typical of reservoir effect. 5.  Calcific atherosclerotic disease aorta. 6. Small, fat containing umbilical hernia. 7. Benign left adrenal adenoma requires no additional evaluation or follow-up. Assessment/Plan:    Active Hospital Problems    Diagnosis    S/P TAVR (transcatheter aortic valve replacement) [Z95.2]     Priority: Medium    Sinus pause [I45.5]    GBS (Guillain-[place] syndrome) ([facility]) [G61.0]    Bilateral leg weakness [R29.898]    ESRD (end stage renal disease) on dialysis ([facility] [N18.6, Z99.2]    DM (diabetes mellitus), secondary, uncontrolled, w/neurologic complic ([facility] [G22.70, V06.92]    Nonischemic cardiomyopathy ([facility] [I42.8]     Bilateral lower extremity weakness numbness - unclear etiology. Exam seems inconsistent.  - MRI shows L5-S1 disc protrusion.  - discussed with Ortho Spine - recommended MRI cervical and thoracic spine to rule out epidural abscess, given elevated ESR and CRP. This was WNL  - neurology consulted and now signed off.  - s/p LP 12/11/2020, which was unrevealing.     ESRD on HD   - nephrology consulted and following.  - MWF HD     Chronic back pain   - continue home medication and muscle relaxant  - increase to Percocet from 7.5 to 10mg/325.   - follows with pain management as outpatient.     DM2 with neuropathy  - continue Lantus and medium dose SSI. - continue gabapentin and Cymbalta. Hyponatremia   - nephrology following.  - hold Celexa.     Anemia - likely due to ESRD.     Chronic diastolic CHF - no evidence of fluid overload     COPD - stable. Continue home inhalers     CAD -  Continue Pravachol and BB. Continue to hold ASA and Plavix overnight in case of need for pacer. Bradycardia with pauses  - cardiology consulted and following.  - discontinue diltiazem. Resume Coreg at 6.25 mg.    - EP consulted for possible pacer placement    Constipation - likely due to immobility and narcotic use. Resolved. DVT Prophylaxis: Subcutaneous Heparin  Diet: DIET RENAL; Daily Fluid Restriction: 1000 ml  Dietary Nutrition Supplements: Diabetic Oral Supplement  Code Status: Full Code    PT/OT Eval Status: recommend ARU, but insurance denied.   Planning for [facility]    Dispo - medically stable for dc, but pre-cert is still pending. Here through weekend. Patient is now saying he wishes to be discharged home. Will speak with CM to see if [facility] will provide transport to HD from the home.     Samy Watson, APRN - CNP

## 2020-12-19 NOTE — PROGRESS NOTES
Rhea Milan RN spoke with weekend  who said that transport for dialysis can be set up on Monday but not on the weekend for a new patient. Patient notified.

## 2020-12-19 NOTE — PROGRESS NOTES
[facility]       Progress Note           Subjective/   44y.o. year old male who we are seeing in consultation for ESRD. No issues with HD yesterday. Vascath site still sore. ROS: No fever or chills. Eating well  Social: No family at bedside. Objective/   GEN:  Chronically ill, /85   Pulse 81   Temp 98.3 °F (36.8 °C) (Oral)   Resp 18   Ht 5' 9\" (1.753 m)   Wt 257 lb 0.9 oz (116.6 kg)   SpO2 93%   BMI 37.96 kg/m²      HEENT: non-icteric, no JVD  CV: S1, S2 without m/r/g; no LE edema  RESP: CTA B without w/r/r; breathing wnl  ABD: +bs, soft, nt, no hsm  SKIN: warm, no rashes  ACCESS: [facility] [place]    Data/  Recent Labs     12/17/20  1217 12/18/20  0920 12/19/20  0856   WBC 9.8 9.5 10.2   HGB 9.6* 9.4* 10.1*   HCT 29.8* 28.2* 30.5*   MCV 91.2 89.8 90.3    263 289     Recent Labs     12/17/20  1217 12/18/20  0920 12/19/20  0856   * 127* 130*   K 5.2* 4.6 4.7   CL 93* 90* 95*   CO2 25 27 25   GLUCOSE 193* 102* 125*   BUN 33* 42* 30*   CREATININE 5.0* 6.3* 5.0*   LABGLOM 12* 9* 12*   GFRAA 15* 11* 15*       Assessment/Plan     ESRD.  - On HD M[facility] at [facility]. - Vascular access: TDC. Started doxycycline for exit site infection and transitioned to IV vancomycin with dialysis for 3 treatment ( no IV access ). - HD again Monday    Constipation:  Magnesium citrate, miralax, and colace-> Improved.     Hyponatremia. - Likely from fluid in an ESRD patient. - UF with HD. Fluid restriction.     Hypertension.  - BP readings better after stopping Hydralazine.  - On Torsemide, Carvedilol, Diltiazem, Imdur and Losartan.     Anemia.  - Aranesp 25mcg qweekly with HD qWednesday. - Hgb stable in 9's.     Bilateral LE Weakness. - Appears to be peripheral neuropathy.   Poor chance of improvement   - Needing placement

## 2020-12-19 NOTE — CARE COORDINATION
Late entry- called by RN to ask about dialysis transport as pt now wants to go home. Unfortunately writer unable to change or confirm w/c transport changes over the weekend. Best plan would be for pt to remain IP until after dialysis Monday and have the option of discharging to facility or changing w/c transportation to pickup from home for Wednesday and then discharging home.

## 2020-12-20 LAB
ANION GAP SERPL CALCULATED.3IONS-SCNC: 10 MMOL/L (ref 3–16)
ANISOCYTOSIS: ABNORMAL
BANDED NEUTROPHILS RELATIVE PERCENT: 8 % (ref 0–7)
BASOPHILS ABSOLUTE: 0 K/UL (ref 0–0.2)
BASOPHILS RELATIVE PERCENT: 0 %
BUN BLDV-MCNC: 44 MG/DL (ref 7–20)
CALCIUM SERPL-MCNC: 9.4 MG/DL (ref 8.3–10.6)
CHLORIDE BLD-SCNC: 95 MMOL/L (ref 99–110)
CO2: 24 MMOL/L (ref 21–32)
CREAT SERPL-MCNC: 5.8 MG/DL (ref 0.9–1.3)
EOSINOPHILS ABSOLUTE: 0.2 K/UL (ref 0–0.6)
EOSINOPHILS RELATIVE PERCENT: 2 %
GFR AFRICAN AMERICAN: 12
GFR NON-AFRICAN AMERICAN: 10
GLUCOSE BLD-MCNC: 159 MG/DL (ref 70–99)
GLUCOSE BLD-MCNC: 161 MG/DL (ref 70–99)
GLUCOSE BLD-MCNC: 164 MG/DL (ref 70–99)
GLUCOSE BLD-MCNC: 171 MG/DL (ref 70–99)
GLUCOSE BLD-MCNC: 175 MG/DL (ref 70–99)
HCT VFR BLD CALC: 30.3 % (ref 40.5–52.5)
HEMOGLOBIN: 10 G/DL (ref 13.5–17.5)
LYMPHOCYTES ABSOLUTE: 1.5 K/UL (ref 1–5.1)
LYMPHOCYTES RELATIVE PERCENT: 14 %
MCH RBC QN AUTO: 29.9 PG (ref 26–34)
MCHC RBC AUTO-ENTMCNC: 33.1 G/DL (ref 31–36)
MCV RBC AUTO: 90.3 FL (ref 80–100)
MONOCYTES ABSOLUTE: 0.7 K/UL (ref 0–1.3)
MONOCYTES RELATIVE PERCENT: 7 %
NEUTROPHILS ABSOLUTE: 8.2 K/UL (ref 1.7–7.7)
NEUTROPHILS RELATIVE PERCENT: 69 %
OVALOCYTES: ABNORMAL
PDW BLD-RTO: 13.8 % (ref 12.4–15.4)
PERFORMED ON: ABNORMAL
PLATELET # BLD: 284 K/UL (ref 135–450)
PLATELET SLIDE REVIEW: ADEQUATE
PMV BLD AUTO: 7.6 FL (ref 5–10.5)
POTASSIUM REFLEX MAGNESIUM: 4.9 MMOL/L (ref 3.5–5.1)
RBC # BLD: 3.36 M/UL (ref 4.2–5.9)
SLIDE REVIEW: ABNORMAL
SODIUM BLD-SCNC: 129 MMOL/L (ref 136–145)
WBC # BLD: 10.7 K/UL (ref 4–11)

## 2020-12-20 PROCEDURE — 6370000000 HC RX 637 (ALT 250 FOR IP): Performed by: NURSE PRACTITIONER

## 2020-12-20 PROCEDURE — 94761 N-INVAS EAR/PLS OXIMETRY MLT: CPT

## 2020-12-20 PROCEDURE — 2700000000 HC OXYGEN THERAPY PER DAY

## 2020-12-20 PROCEDURE — 6370000000 HC RX 637 (ALT 250 FOR IP): Performed by: INTERNAL MEDICINE

## 2020-12-20 PROCEDURE — 6360000002 HC RX W HCPCS: Performed by: INTERNAL MEDICINE

## 2020-12-20 PROCEDURE — 80048 BASIC METABOLIC PNL TOTAL CA: CPT

## 2020-12-20 PROCEDURE — 85025 COMPLETE CBC W/AUTO DIFF WBC: CPT

## 2020-12-20 PROCEDURE — 94640 AIRWAY INHALATION TREATMENT: CPT

## 2020-12-20 PROCEDURE — 36415 COLL VENOUS BLD VENIPUNCTURE: CPT

## 2020-12-20 PROCEDURE — 1200000000 HC SEMI PRIVATE

## 2020-12-20 PROCEDURE — 94660 CPAP INITIATION&MGMT: CPT

## 2020-12-20 RX ADMIN — INSULIN LISPRO 2 UNITS: 100 INJECTION, SOLUTION INTRAVENOUS; SUBCUTANEOUS at 09:59

## 2020-12-20 RX ADMIN — DOXYCYCLINE HYCLATE 100 MG: 100 TABLET, COATED ORAL at 09:53

## 2020-12-20 RX ADMIN — OXYCODONE HYDROCHLORIDE AND ACETAMINOPHEN 1 TABLET: 10; 325 TABLET ORAL at 19:58

## 2020-12-20 RX ADMIN — HEPARIN SODIUM 5000 UNITS: 5000 INJECTION INTRAVENOUS; SUBCUTANEOUS at 06:30

## 2020-12-20 RX ADMIN — INSULIN GLARGINE 70 UNITS: 100 INJECTION, SOLUTION SUBCUTANEOUS at 20:23

## 2020-12-20 RX ADMIN — INSULIN LISPRO 2 UNITS: 100 INJECTION, SOLUTION INTRAVENOUS; SUBCUTANEOUS at 18:27

## 2020-12-20 RX ADMIN — PANTOPRAZOLE SODIUM 40 MG: 40 TABLET, DELAYED RELEASE ORAL at 06:30

## 2020-12-20 RX ADMIN — CARVEDILOL 6.25 MG: 6.25 TABLET, FILM COATED ORAL at 09:55

## 2020-12-20 RX ADMIN — TORSEMIDE 100 MG: 100 TABLET ORAL at 09:53

## 2020-12-20 RX ADMIN — ASPIRIN 81 MG CHEWABLE TABLET 81 MG: 81 TABLET CHEWABLE at 09:53

## 2020-12-20 RX ADMIN — GABAPENTIN 100 MG: 100 CAPSULE ORAL at 13:19

## 2020-12-20 RX ADMIN — QUETIAPINE FUMARATE 50 MG: 25 TABLET ORAL at 17:28

## 2020-12-20 RX ADMIN — TRAZODONE HYDROCHLORIDE 150 MG: 50 TABLET ORAL at 20:22

## 2020-12-20 RX ADMIN — OXYCODONE HYDROCHLORIDE AND ACETAMINOPHEN 1 TABLET: 10; 325 TABLET ORAL at 04:47

## 2020-12-20 RX ADMIN — GABAPENTIN 100 MG: 100 CAPSULE ORAL at 20:22

## 2020-12-20 RX ADMIN — DULOXETINE HYDROCHLORIDE 30 MG: 30 CAPSULE, DELAYED RELEASE ORAL at 09:53

## 2020-12-20 RX ADMIN — GLYCOPYRROLATE AND FORMOTEROL FUMARATE 2 PUFF: 9; 4.8 AEROSOL, METERED RESPIRATORY (INHALATION) at 09:26

## 2020-12-20 RX ADMIN — PRAVASTATIN SODIUM 80 MG: 80 TABLET ORAL at 20:23

## 2020-12-20 RX ADMIN — PREGABALIN 25 MG: 25 CAPSULE ORAL at 09:53

## 2020-12-20 RX ADMIN — LOSARTAN POTASSIUM 50 MG: 25 TABLET, FILM COATED ORAL at 09:55

## 2020-12-20 RX ADMIN — PREGABALIN 25 MG: 25 CAPSULE ORAL at 20:22

## 2020-12-20 RX ADMIN — HEPARIN SODIUM 5000 UNITS: 5000 INJECTION INTRAVENOUS; SUBCUTANEOUS at 20:23

## 2020-12-20 RX ADMIN — OXYCODONE HYDROCHLORIDE AND ACETAMINOPHEN 1 TABLET: 10; 325 TABLET ORAL at 13:24

## 2020-12-20 RX ADMIN — CARVEDILOL 6.25 MG: 6.25 TABLET, FILM COATED ORAL at 17:28

## 2020-12-20 RX ADMIN — INSULIN LISPRO 2 UNITS: 100 INJECTION, SOLUTION INTRAVENOUS; SUBCUTANEOUS at 13:20

## 2020-12-20 RX ADMIN — TIZANIDINE 2 MG: 4 TABLET ORAL at 20:22

## 2020-12-20 RX ADMIN — PREGABALIN 25 MG: 25 CAPSULE ORAL at 13:20

## 2020-12-20 RX ADMIN — INSULIN LISPRO 1 UNITS: 100 INJECTION, SOLUTION INTRAVENOUS; SUBCUTANEOUS at 20:23

## 2020-12-20 RX ADMIN — TIZANIDINE 2 MG: 4 TABLET ORAL at 09:53

## 2020-12-20 RX ADMIN — DOXYCYCLINE HYCLATE 100 MG: 100 TABLET, COATED ORAL at 20:23

## 2020-12-20 RX ADMIN — ISOSORBIDE MONONITRATE 30 MG: 30 TABLET, EXTENDED RELEASE ORAL at 09:55

## 2020-12-20 RX ADMIN — TIZANIDINE 2 MG: 4 TABLET ORAL at 13:20

## 2020-12-20 RX ADMIN — HEPARIN SODIUM 5000 UNITS: 5000 INJECTION INTRAVENOUS; SUBCUTANEOUS at 13:20

## 2020-12-20 RX ADMIN — PANTOPRAZOLE SODIUM 40 MG: 40 TABLET, DELAYED RELEASE ORAL at 17:28

## 2020-12-20 RX ADMIN — CLOPIDOGREL BISULFATE 75 MG: 75 TABLET ORAL at 09:53

## 2020-12-20 RX ADMIN — GABAPENTIN 100 MG: 100 CAPSULE ORAL at 09:53

## 2020-12-20 ASSESSMENT — PAIN SCALES - GENERAL
PAINLEVEL_OUTOF10: 10
PAINLEVEL_OUTOF10: 8

## 2020-12-20 NOTE — PROGRESS NOTES
Hospitalist Progress Note      PCP: Carl Temple MD    Date of Admission: 12/4/2020    Chief Complaint: Bilateral lower extremity weakness, numbness, and inability to walk for 2 days. Hospital Course: 44 y.o. male who presented to [facility] with above complaint. He has a history of diabetes and neuropathy. He does have lower back pain for long time. He has been feeling more numbness for last 2 days and today he could not get up and walk. He felt like he does not have leg. He fell couple of times and there is no apparent injuries. Currently he does not feel below the mid calf bilateral lower extremities. And he is able to move some extent lower extremities but unable to walk. Subjective:  Apparently fell last night - his feet slipped out from underneath him. Luckily, he did not sustain any injuries.     Medications:  Reviewed    Infusion Medications   Scheduled Medications    doxycycline hyclate  100 mg Oral 2 times per day    lactulose  20 g Oral 6 times per day    docusate sodium  100 mg Oral Daily    polyethylene glycol  17 g Oral BID    pantoprazole  40 mg Oral BID AC    carvedilol  6.25 mg Oral BID WC    darbepoetin siddharth-polysorbate  25 mcg Intravenous Q7 Days    lidocaine  1 patch Transdermal Daily    insulin lispro  0-12 Units Subcutaneous TID WC    insulin lispro  0-6 Units Subcutaneous Nightly    aspirin  81 mg Oral Daily    clopidogrel  75 mg Oral Daily    DULoxetine  30 mg Oral Daily    gabapentin  100 mg Oral TID    insulin glargine  70 Units Subcutaneous Nightly    isosorbide mononitrate  30 mg Oral Daily    linaclotide  145 mcg Oral QAM AC    losartan  50 mg Oral Daily    pravastatin  80 mg Oral Nightly    pregabalin  25 mg Oral TID    QUEtiapine  50 mg Oral QPM    glycopyrrolate-formoterol  2 puff Inhalation BID    tiZANidine  2 mg Oral TID    torsemide  100 mg Oral Daily    traZODone  150 mg Oral Nightly    sodium chloride flush  10 mL Intravenous 2 ALT, BILIDIR, BILITOT, ALKPHOS in the last 72 hours. No results for input(s): INR in the last 72 hours. No results for input(s): Coralyn Aguilar in the last 72 hours. Urinalysis:      Lab Results   Component Value Date    NITRU Negative 06/05/2020    WBCUA 10-20 06/05/2020    BACTERIA 2+ 06/05/2020    RBCUA 0-2 06/05/2020    BLOODU TRACE-INTACT 06/05/2020    SPECGRAV 1.020 06/05/2020    GLUCOSEU 250 06/05/2020       Radiology:  IR LUMBAR PUNCTURE FOR DIAGNOSIS   Final Result   Successful fluoroscopic-guided lumbar puncture. CT ABDOMEN PELVIS W IV CONTRAST Additional Contrast? Radiologist Recommendation   Final Result   No acute abdominopelvic abnormality. MRI THORACIC SPINE WO CONTRAST   Final Result   No acute thoracic spine abnormality. No thoracic spinal canal or neural   foraminal stenosis. MRI CERVICAL SPINE WO CONTRAST   Final Result   No acute cervical spine abnormality. Mild degenerative spondylosis without significant cervical spinal canal   stenosis or high-grade neural foraminal stenosis. Mild left C4-C5 and right C6-C7 neural foraminal stenosis. MRI LUMBAR SPINE WO CONTRAST   Final Result   1. Mild L1-2 and L5-S1 degenerative disc height loss. 2. Moderate right L5 neural foraminal narrowing secondary to a right   foraminal L5-S1 disc protrusion. 3. Mild L1-2 spinal canal stenosis. CT ABDOMEN PELVIS WO CONTRAST   Final Result   1. No CT evidence of an acute intra-abdominal or intrapelvic process. 2.  No findings to suggest acute appendicitis; no ureter calculus or   hydronephrosis. 3. Probably reactive lymph nodes in the right upper quadrant, unchanged from   prior study. 4.  Mild extrahepatic bile duct dilatation status post cholecystectomy   typical of reservoir effect. 5.  Calcific atherosclerotic disease aorta. 6. Small, fat containing umbilical hernia.    7. Benign left adrenal adenoma requires no additional evaluation or follow-up. Assessment/Plan:    Active Hospital Problems    Diagnosis    S/P TAVR (transcatheter aortic valve replacement) [Z95.2]     Priority: Medium    Sinus pause [I45.5]    GBS (Guillain-Vincent syndrome) ([facility]) [G61.0]    Bilateral leg weakness [R29.898]    ESRD (end stage renal disease) on dialysis ([facility] [N18.6, Z99.2]    DM (diabetes mellitus), secondary, uncontrolled, w/neurologic complic ([facility] [address] [N03.30, A56.78]    Nonischemic cardiomyopathy ([facility] [address] [I42.8]     Bilateral lower extremity weakness numbness - unclear etiology. Exam seems inconsistent.  - MRI shows L5-S1 disc protrusion.  - discussed with Ortho Spine - recommended MRI cervical and thoracic spine to rule out epidural abscess, given elevated ESR and CRP. This was WNL  - neurology consulted and now signed off.  - s/p LP 12/11/2020, which was unrevealing.     ESRD on HD   - nephrology consulted and following.  - MWF HD     Chronic back pain   - continue home medication and muscle relaxant  - increase to Percocet from 7.5 to 10mg/325.   - follows with pain management as outpatient.     DM2 with neuropathy  - continue Lantus and medium dose SSI. - continue gabapentin and Cymbalta. Hyponatremia   - nephrology following.  - hold Celexa.     Anemia - likely due to ESRD.     Chronic diastolic CHF - no evidence of fluid overload     COPD - stable. Continue home inhalers     CAD -  Continue Pravachol and BB. Continue to hold ASA and Plavix overnight in case of need for pacer. Bradycardia with pauses  - cardiology consulted and following.  - discontinue diltiazem. Resume Coreg at 6.25 mg.    - EP consulted for possible pacer placement    Constipation - likely due to immobility and narcotic use. Resolved.     DVT Prophylaxis: Subcutaneous Heparin  Diet: DIET RENAL; Daily Fluid Restriction: 1000 ml  Dietary Nutrition Supplements: Diabetic Oral Supplement  Code Status: Full Code    PT/OT Eval Status: recommend ARU, but insurance denied. Planning for EGS    Dispo - medically stable for dc, but pre-cert is still pending. Here through weekend.   Patient is now saying he wishes to be discharged home - case management to see if they can arrange HD transportation on Monday     Lalita Silva, JAY - CNP

## 2020-12-20 NOTE — PROGRESS NOTES
12/19/20 2151   NIV Type   Equipment Type V60   Mode CPAP   Mask Type Full face mask   Mask Size Medium   Settings/Measurements   CPAP/EPAP 12 cmH2O   Resp 9   FiO2  30 %   Vt Exhaled 615 mL   Minute Volume 6.2 Liters   Mask Leak (lpm) 15 lpm   Comfort Level Good   Using Accessory Muscles No   SpO2 99

## 2020-12-20 NOTE — PROGRESS NOTES
Pt called out stating that he fell on his right hip. Pt was back in bed when staff arrived in room. Vital signs stable. Non skid socks placed on pt. Pt stated he turned the end of the bed alarm off. Pt refusing green box bed alarm. Safe care to be completed.      Pt allowed bed alarm to be placed

## 2020-12-20 NOTE — PROGRESS NOTES
12/20/20 0140   NIV Type   $NIV $Daily Charge   Equipment Type V60   Mode CPAP   Mask Type Full face mask   Mask Size Medium   Settings/Measurements   CPAP/EPAP 12 cmH2O   Resp 13   FiO2  30 %   Vt Exhaled 672 mL   Minute Volume 7 Liters   Mask Leak (lpm) 49 lpm   Comfort Level Good   Using Accessory Muscles No

## 2020-12-20 NOTE — PROGRESS NOTES
[facility]       Progress Note           Subjective/   44y.o. year old male who we are seeing in consultation for ESRD. No new issues. Vascath site less sore. ROS: No fever or chills. Eating well  Social: No family at bedside. Objective/   GEN:  Chronically ill, BP (!) 141/78   Pulse 79   Temp 97.5 °F (36.4 °C) (Oral)   Resp 16   Ht 5' 9\" (1.753 m)   Wt 259 lb 9.6 oz (117.8 kg)   SpO2 98%   BMI 38.34 kg/m²      HEENT: non-icteric, no JVD  CV: S1, S2 without m/r/g; no LE edema  RESP: CTA B without w/r/r; breathing wnl  ABD: +bs, soft, nt, no hsm  SKIN: warm, no rashes  ACCESS: L [facility]    Data/  Recent Labs     12/18/20  0920 12/19/20  0856 12/20/20  1121   WBC 9.5 10.2 10.7   HGB 9.4* 10.1* 10.0*   HCT 28.2* 30.5* 30.3*   MCV 89.8 90.3 90.3    289 284     Recent Labs     12/18/20  0920 12/19/20  0856 12/20/20  1121   * 130* 129*   K 4.6 4.7 4.9   CL 90* 95* 95*   CO2 27 25 24   GLUCOSE 102* 125* 164*   BUN 42* 30* 44*   CREATININE 6.3* 5.0* 5.8*   LABGLOM 9* 12* 10*   GFRAA 11* 15* 12*       Assessment/Plan     ESRD.  - On HD MWF at [facility]. - Vascular access: TDC. Started doxycycline for exit site infection and transitioned to IV vancomycin with dialysis for 3 treatment ( no IV access ). - HD again Monday    Constipation:  Magnesium citrate, miralax, and colace-> Improved.     Hyponatremia. - Likely from fluid in an ESRD patient. - UF with HD. Fluid restriction.     Hypertension.  - BP readings better after stopping Hydralazine.  - On Torsemide, Carvedilol, Diltiazem, Imdur and Losartan.     Anemia.  - Aranesp 25mcg qweekly with HD qWednesday. - Hgb stable in 9's, now 10.0 today.     Bilateral LE Weakness. - Appears to be peripheral neuropathy. Poor chance of improvement     Dispo Monday? Will plan on HD early Monday to facilitate discharge.

## 2020-12-21 VITALS
TEMPERATURE: 98.6 F | BODY MASS INDEX: 38.73 KG/M2 | DIASTOLIC BLOOD PRESSURE: 65 MMHG | HEART RATE: 91 BPM | OXYGEN SATURATION: 98 % | RESPIRATION RATE: 16 BRPM | SYSTOLIC BLOOD PRESSURE: 108 MMHG | HEIGHT: 69 IN | WEIGHT: 261.47 LBS

## 2020-12-21 LAB
ANION GAP SERPL CALCULATED.3IONS-SCNC: 11 MMOL/L (ref 3–16)
BANDED NEUTROPHILS RELATIVE PERCENT: 5 % (ref 0–7)
BASOPHILIC STIPPLING: ABNORMAL
BASOPHILS ABSOLUTE: 0 K/UL (ref 0–0.2)
BASOPHILS RELATIVE PERCENT: 0 %
BUN BLDV-MCNC: 60 MG/DL (ref 7–20)
CALCIUM SERPL-MCNC: 9.2 MG/DL (ref 8.3–10.6)
CHLORIDE BLD-SCNC: 93 MMOL/L (ref 99–110)
CO2: 24 MMOL/L (ref 21–32)
CREAT SERPL-MCNC: 6.7 MG/DL (ref 0.9–1.3)
EOSINOPHILS ABSOLUTE: 0.5 K/UL (ref 0–0.6)
EOSINOPHILS RELATIVE PERCENT: 5 %
GFR AFRICAN AMERICAN: 11
GFR NON-AFRICAN AMERICAN: 9
GLUCOSE BLD-MCNC: 127 MG/DL (ref 70–99)
GLUCOSE BLD-MCNC: 134 MG/DL (ref 70–99)
GLUCOSE BLD-MCNC: 136 MG/DL (ref 70–99)
HCT VFR BLD CALC: 27.6 % (ref 40.5–52.5)
HEMOGLOBIN: 9.1 G/DL (ref 13.5–17.5)
LYMPHOCYTES ABSOLUTE: 1.1 K/UL (ref 1–5.1)
LYMPHOCYTES RELATIVE PERCENT: 12 %
MCH RBC QN AUTO: 30 PG (ref 26–34)
MCHC RBC AUTO-ENTMCNC: 33 G/DL (ref 31–36)
MCV RBC AUTO: 90.9 FL (ref 80–100)
METAMYELOCYTES RELATIVE PERCENT: 1 %
MONOCYTES ABSOLUTE: 0.8 K/UL (ref 0–1.3)
MONOCYTES RELATIVE PERCENT: 8 %
NEUTROPHILS ABSOLUTE: 7.1 K/UL (ref 1.7–7.7)
NEUTROPHILS RELATIVE PERCENT: 69 %
PDW BLD-RTO: 13.8 % (ref 12.4–15.4)
PERFORMED ON: ABNORMAL
PERFORMED ON: ABNORMAL
PLATELET # BLD: 270 K/UL (ref 135–450)
PMV BLD AUTO: 7.4 FL (ref 5–10.5)
POLYCHROMASIA: ABNORMAL
POTASSIUM REFLEX MAGNESIUM: 5 MMOL/L (ref 3.5–5.1)
RBC # BLD: 3.03 M/UL (ref 4.2–5.9)
SODIUM BLD-SCNC: 128 MMOL/L (ref 136–145)
WBC # BLD: 9.5 K/UL (ref 4–11)

## 2020-12-21 PROCEDURE — 80048 BASIC METABOLIC PNL TOTAL CA: CPT

## 2020-12-21 PROCEDURE — 6370000000 HC RX 637 (ALT 250 FOR IP): Performed by: INTERNAL MEDICINE

## 2020-12-21 PROCEDURE — 2700000000 HC OXYGEN THERAPY PER DAY

## 2020-12-21 PROCEDURE — 85025 COMPLETE CBC W/AUTO DIFF WBC: CPT

## 2020-12-21 PROCEDURE — 6370000000 HC RX 637 (ALT 250 FOR IP): Performed by: NURSE PRACTITIONER

## 2020-12-21 PROCEDURE — 6360000002 HC RX W HCPCS: Performed by: INTERNAL MEDICINE

## 2020-12-21 PROCEDURE — 90935 HEMODIALYSIS ONE EVALUATION: CPT

## 2020-12-21 PROCEDURE — 94761 N-INVAS EAR/PLS OXIMETRY MLT: CPT

## 2020-12-21 PROCEDURE — 94660 CPAP INITIATION&MGMT: CPT

## 2020-12-21 RX ADMIN — OXYCODONE HYDROCHLORIDE AND ACETAMINOPHEN 1 TABLET: 10; 325 TABLET ORAL at 13:11

## 2020-12-21 RX ADMIN — ASPIRIN 81 MG CHEWABLE TABLET 81 MG: 81 TABLET CHEWABLE at 13:11

## 2020-12-21 RX ADMIN — TIZANIDINE 2 MG: 4 TABLET ORAL at 13:12

## 2020-12-21 RX ADMIN — DOXYCYCLINE HYCLATE 100 MG: 100 TABLET, COATED ORAL at 13:12

## 2020-12-21 RX ADMIN — HEPARIN SODIUM 5000 UNITS: 5000 INJECTION INTRAVENOUS; SUBCUTANEOUS at 13:14

## 2020-12-21 RX ADMIN — CLOPIDOGREL BISULFATE 75 MG: 75 TABLET ORAL at 13:12

## 2020-12-21 RX ADMIN — ISOSORBIDE MONONITRATE 30 MG: 30 TABLET, EXTENDED RELEASE ORAL at 13:11

## 2020-12-21 RX ADMIN — OXYCODONE HYDROCHLORIDE AND ACETAMINOPHEN 1 TABLET: 10; 325 TABLET ORAL at 03:58

## 2020-12-21 RX ADMIN — GABAPENTIN 100 MG: 100 CAPSULE ORAL at 13:11

## 2020-12-21 RX ADMIN — TORSEMIDE 100 MG: 100 TABLET ORAL at 13:11

## 2020-12-21 RX ADMIN — DULOXETINE HYDROCHLORIDE 30 MG: 30 CAPSULE, DELAYED RELEASE ORAL at 13:11

## 2020-12-21 RX ADMIN — LOSARTAN POTASSIUM 50 MG: 25 TABLET, FILM COATED ORAL at 13:12

## 2020-12-21 RX ADMIN — PREGABALIN 25 MG: 25 CAPSULE ORAL at 13:12

## 2020-12-21 RX ADMIN — PANTOPRAZOLE SODIUM 40 MG: 40 TABLET, DELAYED RELEASE ORAL at 06:50

## 2020-12-21 RX ADMIN — CARVEDILOL 6.25 MG: 6.25 TABLET, FILM COATED ORAL at 13:11

## 2020-12-21 ASSESSMENT — PAIN SCALES - GENERAL
PAINLEVEL_OUTOF10: 10
PAINLEVEL_OUTOF10: 0
PAINLEVEL_OUTOF10: 9

## 2020-12-21 NOTE — PROGRESS NOTES
[facility]       Progress Note           Subjective/   44y.o. year old male who we are seeing in consultation for ESRD. Doing well today and wants to go home  No new issues  HD tolerated well     ROS: No fever or chills. Eating well  Social: No family at bedside. Objective/   GEN:  Chronically ill, /65   Pulse 91   Temp 98.6 °F (37 °C) (Oral)   Resp 16   Ht 5' 9\" (1.753 m)   Wt 261 lb 7.5 oz (118.6 kg)   SpO2 98%   BMI 38.61 kg/m²    Gen: Resting , in no acute distress  Head: NC , AT  Neck: supple, trachea in midline, no overt thyromegaly noted  CV: no JVD appreciated  Lungs: noincreased WOB, no abdominal breathing pattern, not using accessory muscles  Neuro: alert and awake, no resting tremors  Skin: no rash/lesions on exposed skin of head and neck   ACCESS: L [facility]    Data/  Recent Labs     12/19/20  0856 12/20/20  1121 12/21/20  0845   WBC 10.2 10.7 9.5   HGB 10.1* 10.0* 9.1*   HCT 30.5* 30.3* 27.6*   MCV 90.3 90.3 90.9    284 270     Recent Labs     12/19/20  0856 12/20/20  1121 12/21/20  0845   * 129* 128*   K 4.7 4.9 5.0   CL 95* 95* 93*   CO2 25 24 24   GLUCOSE 125* 164* 127*   BUN 30* 44* 60*   CREATININE 5.0* 5.8* 6.7*   LABGLOM 12* 10* 9*   GFRAA 15* 12* 11*       Assessment/Plan     ESRD.  - On HD MWF at [facility]. - Vascular access: TDC. Started doxycycline for exit site infection and transitioned to IV vancomycin with dialysis for 3 treatment ( no IV access ). HD today tolerated well       Hyponatremia. - Likely from fluid in an ESRD patient. - UF with HD.  Fluid restriction.     Hypertension.  - BP readings better after stopping Hydralazine.     Anemia.  - Aranesp 25mcg qweekly with HD qWednesday.

## 2020-12-21 NOTE — CARE COORDINATION
Patient is now refusing SNF. Planning to discharge home with HD at [facility] M-W-F. This writer contacted Jad Phan) to confirm transport to and from HD. She will confirm plan and call back.     Rock Shane RN

## 2020-12-21 NOTE — CARE COORDINATION
[facility]    DC order noted, all docs needed have been faxed to [facility] for home care services.     Home care to see patient within 24-48 hrs    Bill Pierre RN, BSN CTN  [facility] [phone]

## 2020-12-21 NOTE — PROGRESS NOTES
12/20/20 2234   NIV Type   Skin Protection for O2 Device No  (pt does not wish to wear this for cpap use)   NIV Started/Stopped On   Equipment Type v60   Mode CPAP   Mask Type Full face mask   Settings/Measurements   CPAP/EPAP 12 cmH2O   Resp 16   FiO2  30 %   Vt Exhaled 764 mL   Minute Volume 8 Liters   Mask Leak (lpm) 21 lpm   Comfort Level Good   Using Accessory Muscles No   SpO2 98   Breath Sounds   Right Upper Lobe Diminished   Right Middle Lobe Diminished   Right Lower Lobe Diminished   Left Upper Lobe Diminished   Left Lower Lobe Diminished   Alarm Settings   Alarms On Y   Press Low Alarm 6 cmH2O   High Pressure Alarm 25 cmH2O   Delay Alarm 20 sec(s)   Resp Rate Low Alarm 6   High Respiratory Rate 40 br/min

## 2020-12-28 ENCOUNTER — TELEPHONE (OUTPATIENT)
Dept: ADMINISTRATIVE | Age: 50
End: 2020-12-28

## 2020-12-28 NOTE — TELEPHONE ENCOUNTER
----- Message from Meet Waite sent at 12/28/2020  1:04 PM EST -----  Subject: Refill Request    QUESTIONS  Name of Medication? Insulin Pen Needle 31G X 5 MM MISC  Patient-reported dosage and instructions? Inject once daily  How many days do you have left? 0  Preferred Pharmacy? [facility] #[id number]  Pharmacy phone number (if available)? [phone]  Additional Information for Provider? Pt needs a refill because he is   injecting 4 times daily   instead of once daily. Prescriptions needs to be changed and a refill   submitted.  ---------------------------------------------------------------------------  --------------  CALL BACK INFO  What is the best way for the office to contact you? OK to leave message on   voicemail  Preferred Call Back Phone Number?  [phone]

## 2020-12-28 NOTE — CARE COORDINATION
nonadmit [facility]  Patient refused    Candido Keyes RN, BSN CTN  [facility] [phone]

## 2020-12-29 ENCOUNTER — NURSE TRIAGE (OUTPATIENT)
Dept: OTHER | Facility: CLINIC | Age: 50
End: 2020-12-29

## 2020-12-29 NOTE — TELEPHONE ENCOUNTER
Received call from [address] in [facility]. Call soft transferred to [facility] to schedule appointment. Attention Provider: Thank you for allowing me to participate in the care of your patient. The  patient was connected to triage in response to information provided to the [facility]. Please do not respond through this encounter as the response is not directed to a shared pool. Reason for Disposition   Looks like a boil or deep ulcer    Answer Assessment - Initial Assessment Questions  1. SYMPTOM: \"What's the main symptom you're concerned about? \" (e.g., rash, sore, callus, drainage, numbness)      2 open sores on ankle and on three toes, describes as big gaping holes on both ankles, both legs. Toes are right leg, holes are both ankles     2. LOCATION: \"Where is the  Sores  located? \" (e.g., foot/toe, top/bottom, left/right)      Both legs     3. ONSET: \"When did the  sores  start? \"      4 days ago, are getting worse     4. PAIN: \"Is there any pain? \" If so, ask: \"How bad is it? \" (Scale: 1-10; mild, moderate, severe)      6/10    5. CAUSE: \"What do you think is causing the symptoms? \"      Diabetic ulcers     6. OTHER SYMPTOMS: \"Do you have any other symptoms? \" (e.g., fever, weakness)      Is on pain management program for lower back pain, so he doesn't always feel the pain     7. PREGNANCY: \"Is there any chance you are pregnant? \" \"When was your last menstrual period? \"      N/A    Protocols used: DIABETES - FOOT PROBLEMS AND QUESTIONS-ADULT-AH

## 2020-12-30 ENCOUNTER — APPOINTMENT (OUTPATIENT)
Dept: GENERAL RADIOLOGY | Age: 50
DRG: 073 | End: 2020-12-30
Payer: COMMERCIAL

## 2020-12-30 ENCOUNTER — APPOINTMENT (OUTPATIENT)
Dept: VASCULAR LAB | Age: 50
DRG: 073 | End: 2020-12-30
Payer: COMMERCIAL

## 2020-12-30 ENCOUNTER — HOSPITAL ENCOUNTER (INPATIENT)
Age: 50
LOS: 6 days | Discharge: HOME | DRG: 073 | End: 2021-01-05
Attending: EMERGENCY MEDICINE | Admitting: HOSPITALIST
Payer: COMMERCIAL
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
## 2020-12-30 DIAGNOSIS — E11.42 DIABETIC POLYNEUROPATHY ASSOCIATED WITH TYPE 2 DIABETES MELLITUS (HCC): ICD-10-CM

## 2020-12-30 PROBLEM — R00.1 SINUS BRADYCARDIA: Status: ACTIVE | Noted: 2020-12-30

## 2020-12-30 PROBLEM — R29.898 WEAKNESS OF BOTH LOWER EXTREMITIES: Status: ACTIVE | Noted: 2020-12-30

## 2020-12-30 LAB
ANION GAP SERPL CALCULATED.3IONS-SCNC: 11 MMOL/L (ref 3–16)
BASOPHILS ABSOLUTE: 0.2 K/UL (ref 0–0.2)
BASOPHILS RELATIVE PERCENT: 1.3 %
BUN BLDV-MCNC: 70 MG/DL (ref 7–20)
CALCIUM SERPL-MCNC: 9.4 MG/DL (ref 8.3–10.6)
CHLORIDE BLD-SCNC: 92 MMOL/L (ref 99–110)
CO2: 27 MMOL/L (ref 21–32)
CREAT SERPL-MCNC: 5.9 MG/DL (ref 0.9–1.3)
EKG ATRIAL RATE: 33 BPM
EKG DIAGNOSIS: NORMAL
EKG Q-T INTERVAL: 494 MS
EKG QRS DURATION: 96 MS
EKG QTC CALCULATION (BAZETT): 412 MS
EKG R AXIS: 38 DEGREES
EKG T AXIS: 72 DEGREES
EKG VENTRICULAR RATE: 42 BPM
EOSINOPHILS ABSOLUTE: 0.5 K/UL (ref 0–0.6)
EOSINOPHILS RELATIVE PERCENT: 4.5 %
GFR AFRICAN AMERICAN: 12
GFR NON-AFRICAN AMERICAN: 10
GLUCOSE BLD-MCNC: 139 MG/DL (ref 70–99)
GLUCOSE BLD-MCNC: 158 MG/DL (ref 70–99)
GLUCOSE BLD-MCNC: 165 MG/DL (ref 70–99)
GLUCOSE BLD-MCNC: 177 MG/DL (ref 70–99)
HCT VFR BLD CALC: 30.7 % (ref 40.5–52.5)
HEMOGLOBIN: 9.8 G/DL (ref 13.5–17.5)
LYMPHOCYTES ABSOLUTE: 2.1 K/UL (ref 1–5.1)
LYMPHOCYTES RELATIVE PERCENT: 17.4 %
MCH RBC QN AUTO: 29.4 PG (ref 26–34)
MCHC RBC AUTO-ENTMCNC: 32 G/DL (ref 31–36)
MCV RBC AUTO: 92 FL (ref 80–100)
MONOCYTES ABSOLUTE: 0.6 K/UL (ref 0–1.3)
MONOCYTES RELATIVE PERCENT: 5.4 %
NEUTROPHILS ABSOLUTE: 8.6 K/UL (ref 1.7–7.7)
NEUTROPHILS RELATIVE PERCENT: 71.4 %
PDW BLD-RTO: 14.8 % (ref 12.4–15.4)
PERFORMED ON: ABNORMAL
PLATELET # BLD: 263 K/UL (ref 135–450)
PMV BLD AUTO: 8.1 FL (ref 5–10.5)
POTASSIUM REFLEX MAGNESIUM: 6.8 MMOL/L (ref 3.5–5.1)
POTASSIUM SERPL-SCNC: 6.5 MMOL/L (ref 3.5–5.1)
RBC # BLD: 3.34 M/UL (ref 4.2–5.9)
REASON FOR REJECTION: NORMAL
REJECTED TEST: NORMAL
SODIUM BLD-SCNC: 130 MMOL/L (ref 136–145)
WBC # BLD: 12.1 K/UL (ref 4–11)

## 2020-12-30 PROCEDURE — 97530 THERAPEUTIC ACTIVITIES: CPT

## 2020-12-30 PROCEDURE — 84132 ASSAY OF SERUM POTASSIUM: CPT

## 2020-12-30 PROCEDURE — 99285 EMERGENCY DEPT VISIT HI MDM: CPT

## 2020-12-30 PROCEDURE — 6370000000 HC RX 637 (ALT 250 FOR IP): Performed by: EMERGENCY MEDICINE

## 2020-12-30 PROCEDURE — 1200000000 HC SEMI PRIVATE

## 2020-12-30 PROCEDURE — 6370000000 HC RX 637 (ALT 250 FOR IP): Performed by: HOSPITALIST

## 2020-12-30 PROCEDURE — 90935 HEMODIALYSIS ONE EVALUATION: CPT

## 2020-12-30 PROCEDURE — G0378 HOSPITAL OBSERVATION PER HR: HCPCS

## 2020-12-30 PROCEDURE — 96374 THER/PROPH/DIAG INJ IV PUSH: CPT

## 2020-12-30 PROCEDURE — 97166 OT EVAL MOD COMPLEX 45 MIN: CPT

## 2020-12-30 PROCEDURE — 2580000003 HC RX 258: Performed by: HOSPITALIST

## 2020-12-30 PROCEDURE — 99214 OFFICE O/P EST MOD 30 MIN: CPT | Performed by: INTERNAL MEDICINE

## 2020-12-30 PROCEDURE — 94640 AIRWAY INHALATION TREATMENT: CPT

## 2020-12-30 PROCEDURE — 80048 BASIC METABOLIC PNL TOTAL CA: CPT

## 2020-12-30 PROCEDURE — 6360000002 HC RX W HCPCS: Performed by: INTERNAL MEDICINE

## 2020-12-30 PROCEDURE — 5A1D70Z PERFORMANCE OF URINARY FILTRATION, INTERMITTENT, LESS THAN 6 HOURS PER DAY: ICD-10-PCS | Performed by: INTERNAL MEDICINE

## 2020-12-30 PROCEDURE — 72220 X-RAY EXAM SACRUM TAILBONE: CPT

## 2020-12-30 PROCEDURE — 2500000003 HC RX 250 WO HCPCS: Performed by: HOSPITALIST

## 2020-12-30 PROCEDURE — 93010 ELECTROCARDIOGRAM REPORT: CPT | Performed by: INTERNAL MEDICINE

## 2020-12-30 PROCEDURE — 6360000002 HC RX W HCPCS: Performed by: EMERGENCY MEDICINE

## 2020-12-30 PROCEDURE — 93005 ELECTROCARDIOGRAM TRACING: CPT | Performed by: EMERGENCY MEDICINE

## 2020-12-30 PROCEDURE — 85025 COMPLETE CBC W/AUTO DIFF WBC: CPT

## 2020-12-30 PROCEDURE — 97162 PT EVAL MOD COMPLEX 30 MIN: CPT

## 2020-12-30 PROCEDURE — 99223 1ST HOSP IP/OBS HIGH 75: CPT | Performed by: INTERNAL MEDICINE

## 2020-12-30 RX ORDER — CALCIUM ACETATE 667 MG/1
1 CAPSULE ORAL
Status: DISCONTINUED | OUTPATIENT
Start: 2020-12-30 | End: 2021-01-05 | Stop reason: HOSPADM

## 2020-12-30 RX ORDER — HEPARIN SODIUM 1000 [USP'U]/ML
4100 INJECTION, SOLUTION INTRAVENOUS; SUBCUTANEOUS
Status: DISCONTINUED | OUTPATIENT
Start: 2020-12-30 | End: 2021-01-02

## 2020-12-30 RX ORDER — DEXTROSE MONOHYDRATE 25 G/50ML
12.5 INJECTION, SOLUTION INTRAVENOUS PRN
Status: DISCONTINUED | OUTPATIENT
Start: 2020-12-30 | End: 2021-01-05 | Stop reason: HOSPADM

## 2020-12-30 RX ORDER — LOSARTAN POTASSIUM 25 MG/1
50 TABLET ORAL DAILY
Status: DISCONTINUED | OUTPATIENT
Start: 2020-12-30 | End: 2021-01-05 | Stop reason: HOSPADM

## 2020-12-30 RX ORDER — PANTOPRAZOLE SODIUM 40 MG/1
40 TABLET, DELAYED RELEASE ORAL
Status: DISCONTINUED | OUTPATIENT
Start: 2020-12-31 | End: 2021-01-05 | Stop reason: HOSPADM

## 2020-12-30 RX ORDER — INSULIN GLARGINE 100 [IU]/ML
70 INJECTION, SOLUTION SUBCUTANEOUS NIGHTLY
Status: DISCONTINUED | OUTPATIENT
Start: 2020-12-30 | End: 2021-01-05 | Stop reason: HOSPADM

## 2020-12-30 RX ORDER — OXYCODONE AND ACETAMINOPHEN 7.5; 325 MG/1; MG/1
1 TABLET ORAL ONCE
Status: COMPLETED | OUTPATIENT
Start: 2020-12-30 | End: 2020-12-30

## 2020-12-30 RX ORDER — CHOLECALCIFEROL (VITAMIN D3) 10 MCG
1 TABLET ORAL DAILY
Status: DISCONTINUED | OUTPATIENT
Start: 2020-12-30 | End: 2021-01-05 | Stop reason: HOSPADM

## 2020-12-30 RX ORDER — ACETAMINOPHEN 325 MG/1
650 TABLET ORAL EVERY 6 HOURS PRN
Status: DISCONTINUED | OUTPATIENT
Start: 2020-12-30 | End: 2021-01-05 | Stop reason: HOSPADM

## 2020-12-30 RX ORDER — PREGABALIN 75 MG/1
75 CAPSULE ORAL ONCE
Status: DISCONTINUED | OUTPATIENT
Start: 2020-12-30 | End: 2020-12-30

## 2020-12-30 RX ORDER — SODIUM CHLORIDE 0.9 % (FLUSH) 0.9 %
10 SYRINGE (ML) INJECTION EVERY 12 HOURS SCHEDULED
Status: DISCONTINUED | OUTPATIENT
Start: 2020-12-30 | End: 2021-01-05 | Stop reason: HOSPADM

## 2020-12-30 RX ORDER — NICOTINE POLACRILEX 4 MG
15 LOZENGE BUCCAL PRN
Status: DISCONTINUED | OUTPATIENT
Start: 2020-12-30 | End: 2021-01-05 | Stop reason: HOSPADM

## 2020-12-30 RX ORDER — HEPARIN SODIUM 1000 [USP'U]/ML
4000 INJECTION, SOLUTION INTRAVENOUS; SUBCUTANEOUS PRN
Status: DISCONTINUED | OUTPATIENT
Start: 2020-12-30 | End: 2021-01-02

## 2020-12-30 RX ORDER — DULOXETIN HYDROCHLORIDE 30 MG/1
30 CAPSULE, DELAYED RELEASE ORAL DAILY
Status: DISCONTINUED | OUTPATIENT
Start: 2020-12-30 | End: 2021-01-05 | Stop reason: HOSPADM

## 2020-12-30 RX ORDER — INSULIN GLARGINE 100 [IU]/ML
35 INJECTION, SOLUTION SUBCUTANEOUS ONCE
Status: COMPLETED | OUTPATIENT
Start: 2020-12-31 | End: 2020-12-31

## 2020-12-30 RX ORDER — QUETIAPINE FUMARATE 25 MG/1
50 TABLET, FILM COATED ORAL EVERY EVENING
Status: DISCONTINUED | OUTPATIENT
Start: 2020-12-30 | End: 2021-01-05 | Stop reason: HOSPADM

## 2020-12-30 RX ORDER — CALCIUM CARBONATE 200(500)MG
1 TABLET,CHEWABLE ORAL 3 TIMES DAILY PRN
Status: DISCONTINUED | OUTPATIENT
Start: 2020-12-30 | End: 2021-01-05 | Stop reason: HOSPADM

## 2020-12-30 RX ORDER — PREGABALIN 25 MG/1
25 CAPSULE ORAL ONCE
Status: COMPLETED | OUTPATIENT
Start: 2020-12-30 | End: 2021-01-01

## 2020-12-30 RX ORDER — PRAVASTATIN SODIUM 80 MG/1
80 TABLET ORAL NIGHTLY
Status: DISCONTINUED | OUTPATIENT
Start: 2020-12-30 | End: 2021-01-05 | Stop reason: HOSPADM

## 2020-12-30 RX ORDER — SODIUM CHLORIDE 0.9 % (FLUSH) 0.9 %
10 SYRINGE (ML) INJECTION PRN
Status: DISCONTINUED | OUTPATIENT
Start: 2020-12-30 | End: 2021-01-05 | Stop reason: HOSPADM

## 2020-12-30 RX ORDER — DEXTROSE MONOHYDRATE 25 G/50ML
25 INJECTION, SOLUTION INTRAVENOUS ONCE
Status: DISCONTINUED | OUTPATIENT
Start: 2020-12-30 | End: 2021-01-05 | Stop reason: HOSPADM

## 2020-12-30 RX ORDER — HYDROXYZINE PAMOATE 25 MG/1
50 CAPSULE ORAL NIGHTLY PRN
Status: DISCONTINUED | OUTPATIENT
Start: 2020-12-30 | End: 2021-01-05 | Stop reason: HOSPADM

## 2020-12-30 RX ORDER — CITALOPRAM 20 MG/1
40 TABLET ORAL DAILY
Status: DISCONTINUED | OUTPATIENT
Start: 2020-12-30 | End: 2021-01-05 | Stop reason: HOSPADM

## 2020-12-30 RX ORDER — HYDROCODONE BITARTRATE AND ACETAMINOPHEN 7.5; 325 MG/1; MG/1
1 TABLET ORAL ONCE
Status: COMPLETED | OUTPATIENT
Start: 2020-12-30 | End: 2020-12-30

## 2020-12-30 RX ORDER — OXYCODONE AND ACETAMINOPHEN 7.5; 325 MG/1; MG/1
1 TABLET ORAL EVERY 6 HOURS PRN
Status: DISCONTINUED | OUTPATIENT
Start: 2020-12-30 | End: 2021-01-05 | Stop reason: HOSPADM

## 2020-12-30 RX ORDER — PREGABALIN 25 MG/1
25 CAPSULE ORAL 3 TIMES DAILY
Status: DISCONTINUED | OUTPATIENT
Start: 2020-12-30 | End: 2021-01-05 | Stop reason: HOSPADM

## 2020-12-30 RX ORDER — PROMETHAZINE HYDROCHLORIDE 25 MG/1
12.5 TABLET ORAL EVERY 6 HOURS PRN
Status: DISCONTINUED | OUTPATIENT
Start: 2020-12-30 | End: 2021-01-05 | Stop reason: HOSPADM

## 2020-12-30 RX ORDER — GABAPENTIN 300 MG/1
300 CAPSULE ORAL ONCE
Status: COMPLETED | OUTPATIENT
Start: 2020-12-30 | End: 2020-12-30

## 2020-12-30 RX ORDER — ALBUTEROL SULFATE 2.5 MG/3ML
2.5 SOLUTION RESPIRATORY (INHALATION) EVERY 6 HOURS PRN
Status: DISCONTINUED | OUTPATIENT
Start: 2020-12-30 | End: 2021-01-05 | Stop reason: HOSPADM

## 2020-12-30 RX ORDER — CLOPIDOGREL BISULFATE 75 MG/1
75 TABLET ORAL DAILY
Status: DISCONTINUED | OUTPATIENT
Start: 2020-12-30 | End: 2021-01-05 | Stop reason: HOSPADM

## 2020-12-30 RX ORDER — FLUTICASONE PROPIONATE 50 MCG
1 SPRAY, SUSPENSION (ML) NASAL 2 TIMES DAILY
Status: DISCONTINUED | OUTPATIENT
Start: 2020-12-30 | End: 2021-01-05 | Stop reason: HOSPADM

## 2020-12-30 RX ORDER — HYDROCODONE BITARTRATE AND ACETAMINOPHEN 7.5; 325 MG/1; MG/1
1 TABLET ORAL EVERY 6 HOURS PRN
Qty: 14 TABLET | Refills: 0 | Status: SHIPPED | OUTPATIENT
Start: 2020-12-30 | End: 2021-01-05 | Stop reason: SDUPTHER

## 2020-12-30 RX ORDER — ACETAMINOPHEN 650 MG/1
650 SUPPOSITORY RECTAL EVERY 6 HOURS PRN
Status: DISCONTINUED | OUTPATIENT
Start: 2020-12-30 | End: 2021-01-05 | Stop reason: HOSPADM

## 2020-12-30 RX ORDER — CALCIUM GLUCONATE 20 MG/ML
1 INJECTION, SOLUTION INTRAVENOUS ONCE
Status: DISCONTINUED | OUTPATIENT
Start: 2020-12-30 | End: 2021-01-05 | Stop reason: HOSPADM

## 2020-12-30 RX ORDER — TORSEMIDE 100 MG/1
100 TABLET ORAL DAILY
Status: DISCONTINUED | OUTPATIENT
Start: 2020-12-30 | End: 2021-01-05 | Stop reason: HOSPADM

## 2020-12-30 RX ORDER — TRAZODONE HYDROCHLORIDE 50 MG/1
150 TABLET ORAL NIGHTLY
Status: DISCONTINUED | OUTPATIENT
Start: 2020-12-30 | End: 2021-01-05 | Stop reason: HOSPADM

## 2020-12-30 RX ORDER — TIZANIDINE 4 MG/1
4 TABLET ORAL 3 TIMES DAILY
Status: DISCONTINUED | OUTPATIENT
Start: 2020-12-30 | End: 2021-01-05 | Stop reason: HOSPADM

## 2020-12-30 RX ORDER — POLYETHYLENE GLYCOL 3350 17 G/17G
17 POWDER, FOR SOLUTION ORAL DAILY PRN
Status: DISCONTINUED | OUTPATIENT
Start: 2020-12-30 | End: 2021-01-05 | Stop reason: HOSPADM

## 2020-12-30 RX ORDER — DEXTROSE MONOHYDRATE 50 MG/ML
100 INJECTION, SOLUTION INTRAVENOUS PRN
Status: DISCONTINUED | OUTPATIENT
Start: 2020-12-30 | End: 2021-01-05 | Stop reason: HOSPADM

## 2020-12-30 RX ORDER — KETOROLAC TROMETHAMINE 30 MG/ML
30 INJECTION, SOLUTION INTRAMUSCULAR; INTRAVENOUS ONCE
Status: COMPLETED | OUTPATIENT
Start: 2020-12-30 | End: 2020-12-30

## 2020-12-30 RX ORDER — OXYCODONE AND ACETAMINOPHEN 7.5; 325 MG/1; MG/1
1 TABLET ORAL EVERY 6 HOURS PRN
Status: ON HOLD | COMMUNITY
End: 2021-01-05 | Stop reason: HOSPADM

## 2020-12-30 RX ORDER — ISOSORBIDE MONONITRATE 30 MG/1
30 TABLET, EXTENDED RELEASE ORAL DAILY
Status: DISCONTINUED | OUTPATIENT
Start: 2020-12-30 | End: 2021-01-05 | Stop reason: HOSPADM

## 2020-12-30 RX ORDER — ONDANSETRON 2 MG/ML
4 INJECTION INTRAMUSCULAR; INTRAVENOUS EVERY 6 HOURS PRN
Status: DISCONTINUED | OUTPATIENT
Start: 2020-12-30 | End: 2021-01-05 | Stop reason: HOSPADM

## 2020-12-30 RX ORDER — HEPARIN SODIUM 5000 [USP'U]/ML
5000 INJECTION, SOLUTION INTRAVENOUS; SUBCUTANEOUS EVERY 8 HOURS SCHEDULED
Status: DISCONTINUED | OUTPATIENT
Start: 2020-12-31 | End: 2021-01-02

## 2020-12-30 RX ORDER — ASPIRIN 81 MG/1
81 TABLET, CHEWABLE ORAL DAILY
Status: DISCONTINUED | OUTPATIENT
Start: 2020-12-30 | End: 2021-01-05 | Stop reason: HOSPADM

## 2020-12-30 RX ORDER — HYDRALAZINE HYDROCHLORIDE 25 MG/1
25 TABLET, FILM COATED ORAL EVERY 8 HOURS SCHEDULED
Status: DISCONTINUED | OUTPATIENT
Start: 2020-12-30 | End: 2021-01-05 | Stop reason: HOSPADM

## 2020-12-30 RX ADMIN — QUETIAPINE FUMARATE 50 MG: 25 TABLET ORAL at 21:49

## 2020-12-30 RX ADMIN — OXYCODONE HYDROCHLORIDE AND ACETAMINOPHEN 1 TABLET: 7.5; 325 TABLET ORAL at 09:27

## 2020-12-30 RX ADMIN — KETOROLAC TROMETHAMINE 30 MG: 30 INJECTION, SOLUTION INTRAMUSCULAR at 04:50

## 2020-12-30 RX ADMIN — INSULIN LISPRO 2 UNITS: 100 INJECTION, SOLUTION INTRAVENOUS; SUBCUTANEOUS at 18:12

## 2020-12-30 RX ADMIN — Medication 10 ML: at 21:50

## 2020-12-30 RX ADMIN — OXYCODONE HYDROCHLORIDE AND ACETAMINOPHEN 1 TABLET: 7.5; 325 TABLET ORAL at 17:22

## 2020-12-30 RX ADMIN — HEPARIN SODIUM 4000 UNITS: 1000 INJECTION INTRAVENOUS; SUBCUTANEOUS at 15:30

## 2020-12-30 RX ADMIN — GLYCOPYRROLATE AND FORMOTEROL FUMARATE 2 PUFF: 9; 4.8 AEROSOL, METERED RESPIRATORY (INHALATION) at 19:47

## 2020-12-30 RX ADMIN — HEPARIN SODIUM 4100 UNITS: 1000 INJECTION INTRAVENOUS; SUBCUTANEOUS at 12:17

## 2020-12-30 RX ADMIN — PREGABALIN 25 MG: 25 CAPSULE ORAL at 21:49

## 2020-12-30 RX ADMIN — TIZANIDINE 4 MG: 4 TABLET ORAL at 21:49

## 2020-12-30 RX ADMIN — TRAZODONE HYDROCHLORIDE 150 MG: 50 TABLET ORAL at 21:49

## 2020-12-30 RX ADMIN — DOXERCALCIFEROL 3.5 MCG: 2 INJECTION, SOLUTION INTRAVENOUS at 15:00

## 2020-12-30 RX ADMIN — CALCIUM ACETATE 667 MG: 667 CAPSULE ORAL at 17:22

## 2020-12-30 RX ADMIN — HYDROCODONE BITARTRATE AND ACETAMINOPHEN 1 TABLET: 7.5; 325 TABLET ORAL at 04:50

## 2020-12-30 RX ADMIN — PRAVASTATIN SODIUM 80 MG: 80 TABLET ORAL at 21:49

## 2020-12-30 RX ADMIN — INSULIN LISPRO 20 UNITS: 100 INJECTION, SOLUTION INTRAVENOUS; SUBCUTANEOUS at 18:12

## 2020-12-30 RX ADMIN — GABAPENTIN 300 MG: 300 CAPSULE ORAL at 04:50

## 2020-12-30 ASSESSMENT — PAIN DESCRIPTION - DESCRIPTORS
DESCRIPTORS: SHOOTING;STABBING
DESCRIPTORS: STABBING;SHARP
DESCRIPTORS: SHARP;STABBING

## 2020-12-30 ASSESSMENT — PAIN DESCRIPTION - DIRECTION: RADIATING_TOWARDS: HIP

## 2020-12-30 ASSESSMENT — PAIN SCALES - GENERAL
PAINLEVEL_OUTOF10: 9
PAINLEVEL_OUTOF10: 10
PAINLEVEL_OUTOF10: 8

## 2020-12-30 ASSESSMENT — PAIN DESCRIPTION - ONSET: ONSET: ON-GOING

## 2020-12-30 ASSESSMENT — PAIN DESCRIPTION - LOCATION
LOCATION: BACK;HIP;GENERALIZED
LOCATION: LEG

## 2020-12-30 ASSESSMENT — PAIN DESCRIPTION - ORIENTATION
ORIENTATION: RIGHT
ORIENTATION: RIGHT

## 2020-12-30 ASSESSMENT — PAIN DESCRIPTION - PAIN TYPE
TYPE: CHRONIC PAIN
TYPE: ACUTE PAIN;CHRONIC PAIN
TYPE: ACUTE PAIN
TYPE: ACUTE PAIN
TYPE: CHRONIC PAIN

## 2020-12-30 ASSESSMENT — PAIN - FUNCTIONAL ASSESSMENT: PAIN_FUNCTIONAL_ASSESSMENT: PREVENTS OR INTERFERES SOME ACTIVE ACTIVITIES AND ADLS

## 2020-12-30 NOTE — PROGRESS NOTES
[employer] called and they will upload into their system his report in 10-15 minutes. Dr. Kylie Valadez made aware.

## 2020-12-30 NOTE — ED NOTES
9327 - called vascular surgery   Re: BLLE pain and neuropathy, no doppler pulse LLE, warm foot  8450 - Dr Tejinder Messina called back to speak with Dr Malathi Nickerson  12/30/20 4119

## 2020-12-30 NOTE — ED PROVIDER NOTES
CHIEF COMPLAINT  Fall (pt states this morning ( 9 byron) started having numbness in legs and hands and progressively has gotten worse. pt got up and fell on his butt about an hour ago. )      HISTORY OF PRESENT ILLNESS  Candace Garcia is a 44 y.o. male who presents to the ED with numbness in his leg and's hands progressively getting worse since this morning. He got up and fell on his butt about an hour ago. Patient has known history of diabetic neuropathy which is not anything new he was supposed to be getting home physical therapy but said that he told the person to leave as he said they were not helping. Patient was supposed to be discharged here on a recent admission to a rehab/SNF but was ultimately discharged home with reportedly home health or PT. Has been having the symptoms since before he was admitted last time. No other complaints, modifying factors or associated symptoms. I have reviewed the following from the nursing documentation.     Past Medical History:   Diagnosis Date    Ambulatory dysfunction     walker for long distances, SOB with distance    Aortic stenosis     echo 2017    Arthritis     hands and hips    Asthma     Bilateral hilar adenopathy syndrome 6/3/2013    CAD (coronary artery disease)     Dr. Dey [facility]) 04/19/2019    EF= 43%    CHF (congestive heart failure) (HCC)     Chronic pain     COPD (chronic obstructive pulmonary disease) ([address]     pulmonology Dr. Frantz Rosales    Depression     Diabetes mellitus ([address]     borderline    Difficult intravenous access     Emphysema of lung ([address]     ESRD (end stage renal disease) on dialysis ([address]     MWF    Fear of needles     Gastric ulcer     GERD (gastroesophageal reflux disease)     Heart valve problem     bicuspic valve    Hemodialysis patient ([address]     History of spinal fracture     work incident    Hx of blood clots     Bilateral lower extremities; stents in place    Hyperlipidemia Alejandro Anthony MD at [facility] ENDOSCOPY  2016    UPPER GASTROINTESTINAL ENDOSCOPY  2017    possible candida, otherwise normal appearing    VASCULAR SURGERY  aprx 2 years ago    2 stents placed, each side of groin     Family History   Problem Relation Age of Onset    Diabetes Mother     Heart Disease Father     Kidney Disease Sister         stage 4-kidney failure    Cancer Sister     Heart Disease Sister     Obesity Sister     Cancer Sister     Heart Disease Sister     Obesity Sister     Alcohol Abuse Brother      Social History     Socioeconomic History    Marital status:      Spouse name: Not on file    Number of children: Not on file    Years of education: Not on file    Highest education level: Not on file   Occupational History    Not on file   Social Needs    Financial resource strain: Not on file    Food insecurity     Worry: Not on file     Inability: Not on file    Transportation needs     Medical: Not on file     Non-medical: Not on file   Tobacco Use    Smoking status: Current Every Day Smoker     Packs/day: 1.00     Years: 33.00     Pack years: 33.00     Types: Cigarettes     Last attempt to quit: 2020     Years since quittin.6    Smokeless tobacco: Never Used    Tobacco comment: TRYING TO QUIT 3-7 a day   Substance and Sexual Activity    Alcohol use: Not Currently     Alcohol/week: 0.0 standard drinks     Comment: occ    Drug use: No    Sexual activity: Yes     Partners: Female     Comment:    Lifestyle    Physical activity     Days per week: Not on file     Minutes per session: Not on file    Stress: Not on file   Relationships    Social connections     Talks on phone: Not on file     Gets together: Not on file     Attends Voodoo service: Not on file     Active member of club or organization: Not on file     Attends meetings of clubs or organizations: Not on file     Relationship status: Not on file    Intimate partner violence     Fear of current or ex partner: Not on file     Emotionally abused: Not on file     Physically abused: Not on file     Forced sexual activity: Not on file   Other Topics Concern    Not on file   Social History Narrative    Not on file     No current facility-administered medications for this encounter. Current Outpatient Medications   Medication Sig Dispense Refill    Insulin Pen Needle 31G X 5 MM MISC 1 each by Does not apply route 4 times daily 300 each 3    hydrOXYzine (VISTARIL) 50 MG capsule TAKE 1 TO 2 CAPSULES BY MOUTH NIGHTLY 60 capsule 5    gabapentin (NEURONTIN) 100 MG capsule Take 1 capsule by mouth 3 times daily for 5 days. 15 capsule 0    pregabalin (LYRICA) 25 MG capsule Take 1 capsule by mouth 3 times daily for 5 days.  15 capsule 0    carvedilol (COREG) 6.25 MG tablet Take 1 tablet by mouth 2 times daily (with meals) 60 tablet 3    lidocaine 4 % external patch Place 1 patch onto the skin daily 30 patch 0    hydrALAZINE (APRESOLINE) 50 MG tablet TAKE 1/2 TABLET BY MOUTH EVERY 8 HOURS 90 tablet 2    B Complex-C-Folic Acid (VIRT-CAPS) 1 MG CAPS TK ONE C PO  QD 90 capsule 1    Continuous Blood Gluc Sensor (FREESTYLE STEPHANY 14 DAY SENSOR) MISC 1 each by Does not apply route every 14 days 6 each 3    insulin glargine (BASAGLAR KWIKPEN) 100 UNIT/ML injection pen Inject 70 Units into the skin nightly 15 mL 5    traZODone (DESYREL) 150 MG tablet TAKE (1) TABLET BY MOUTH NIGHTLY 30 tablet 10    citalopram (CELEXA) 40 MG tablet TAKE (1) TABLET BY MOUTH DAILY 30 tablet 10    insulin aspart (NOVOLOG FLEXPEN) 100 UNIT/ML injection pen Inject 20 Units into the skin 3 times daily (before meals) 15 pen 5    clopidogrel (PLAVIX) 75 MG tablet TAKE 1 TABLET BY MOUTH ONCE DAILY 90 tablet 3    pravastatin (PRAVACHOL) 80 MG tablet TAKE (1) TABLET BY MOUTH ONCE DAILY 90 tablet 3    QUEtiapine (SEROQUEL) 50 MG tablet TAKE 1 TABLET BY MOUTH EVERY EVENING 30 tablet 5    isosorbide mononitrate (IMDUR) 30 MG extended release tablet TAKE 1 TABLET BY MOUTH EVERY DAY 90 tablet 10    torsemide (DEMADEX) 100 MG tablet Take 1-2 tablets by mouth daily 180 tablet 3    ondansetron (ZOFRAN ODT) 4 MG disintegrating tablet Take 1 tablet by mouth every 8 hours as needed for Nausea 60 tablet 0    LINZESS 145 MCG capsule TAKE 1 CAPSULE BY MOUTH EVERY MORNING BEFORE BREAKFAST 30 capsule 10    Calcium Acetate, Phos Binder, 667 MG CAPS TAKE 1 CAPSULE BY MOUTH THREE TIMES DAILY WITH MEALS 90 capsule 3    tiZANidine (ZANAFLEX) 4 MG tablet TAKE 1 TABLET BY MOUTH THREE TIMES DAILY 90 tablet 10    DULoxetine (CYMBALTA) 30 MG extended release capsule TAKE 1 CAPSULE BY MOUTH EVERY DAY 90 capsule 10    nystatin-triamcinolone (MYCOLOG II) 899982-7.1 UNIT/GM-% cream Apply topically 2 times daily. 60 g 2    losartan (COZAAR) 50 MG tablet TAKE (1) TABLET BY MOUTH DAILY 90 tablet 10    pantoprazole (PROTONIX) 40 MG tablet TAKE (1) TABLET BY MOUTH EACH MORNING BEFORE BREAKFAST 90 tablet 10    albuterol (PROVENTIL) (2.5 MG/3ML) 0.083% nebulizer solution INHALE 1 VIAL VIA NEBULIZER EVERY 6 HOURS AS NEEDED FOR WHEEZING 300 mL 10    nystatin (MYCOSTATIN) 817077 UNIT/GM cream Apply topically 2 times daily. 60 g 3    Insulin Syringe-Needle U-100 30G X 1/2\" 0.5 ML MISC 1 each by Does not apply route daily 100 each 3    nitroGLYCERIN (NITROSTAT) 0.4 MG SL tablet DISSOLVE 1 TABLET UNDER THE TONGUE AS NEEDED FOR CHEST PAIN EVERY 5 MINUTES UP TO 3 TIMES. IF NO RELIEF CALL 911. 25 tablet 10    blood glucose test strips (FREESTYLE LITE) strip Daily As needed.  (Patient not taking: Reported on 11/10/2020) 100 strip 3    glucose monitoring kit (FREESTYLE) monitoring kit 1 kit by Does not apply route daily (Patient not taking: Reported on 11/10/2020) 1 kit 0    vitamin D (ERGOCALCIFEROL) 73197 units CAPS capsule TK 1 C PO WEEKLY  11    flunisolide (NASALIDE) 25 MCG/ACT (0.025%) SOLN Inhale 2 sprays into the lungs every 12 hours 1 Bottle 5    Tiotropium Bromide-Olodaterol (STIOLTO RESPIMAT) 2.5-2.5 MCG/ACT AERS Inhale 2 puffs into the lungs daily 2 Inhaler 0    Polyethylene Glycol 3350 GRAN       Glucose Blood (BLOOD GLUCOSE TEST STRIPS) STRP TEST 3-4 TIMES DAILY, AS DIRECTED (Patient not taking: Reported on 11/10/2020) 100 strip 3    Blood Glucose Monitoring Suppl ADAM USE AS DIRECTED. (Patient not taking: Reported on 11/10/2020) 1 Device 0    Alcohol Swabs PADS USE AS DIRECTED 300 each 3    albuterol sulfate  (90 Base) MCG/ACT inhaler Inhale 2 puffs into the lungs every 6 hours as needed for Wheezing 1 Inhaler 3    ipratropium-albuterol (DUONEB) 0.5-2.5 (3) MG/3ML SOLN nebulizer solution Inhale 3 mLs into the lungs every 6 hours as needed for Shortness of Breath 360 mL 1    Lancets MISC Test daily (Patient not taking: Reported on 11/10/2020) 100 each 3    calcium carbonate (TUMS) 500 MG chewable tablet Take 1 tablet by mouth 3 times daily as needed for Heartburn.  aspirin 81 MG chewable tablet Take 1 tablet by mouth daily. (Patient taking differently: Take 81 mg by mouth daily Indications: stopped on 6/25 for surgery ) 30 tablet 2     Allergies   Allergen Reactions    Morphine Nausea And Vomiting       REVIEW OF SYSTEMS  10 systems reviewed, pertinent positives per HPI otherwise noted to be negative. PHYSICAL EXAM  /67   Pulse 52   Temp 98.2 °F (36.8 °C) (Oral)   Resp 16   Ht 5' 9\" (1.753 m)   Wt 254 lb (115.2 kg)   SpO2 96%   BMI 37.51 kg/m²   GENERAL APPEARANCE: Awake and alert. Cooperative. No acute distress. HEAD: Normocephalic. Atraumatic. EYES: PERRL. EOM's grossly intact. ENT: Mucous membranes are moist.   NECK: Supple. HEART: RRR. CHEST/LUNGS: Chest atraumatic, nontender, respirations unlabored. CTAB. Good air exchange. Speaking comfortably in full sentences. BACK: No midline spinal tenderness or step-off. ABDOMEN: Soft. Non-distended. Non-tender. No guarding or rebound. Normal bowel sounds. EXTREMITIES: No peripheral edema. Moves all extremities equally. All extremities neurovascularly intact. RECTAL/: Deferred  SKIN: Warm and dry. No acute rashes. NEUROLOGICAL: Alert and oriented. CN 2-12 intact, No gross facial drooping. Strength 5/5, sensation intact. Normal coordination. Gait normal.   PSYCHIATRIC: Normal mood and affect. LABS  I have reviewed all labs for this visit.    Results for orders placed or performed during the hospital encounter of 11/54/38   Basic Metabolic Panel w/ Reflex to MG   Result Value Ref Range    Sodium 130 (L) 136 - 145 mmol/L    Potassium reflex Magnesium 6.8 (HH) 3.5 - 5.1 mmol/L    Chloride 92 (L) 99 - 110 mmol/L    CO2 27 21 - 32 mmol/L    Anion Gap 11 3 - 16    Glucose 177 (H) 70 - 99 mg/dL    BUN 70 (H) 7 - 20 mg/dL    CREATININE 5.9 (HH) 0.9 - 1.3 mg/dL    GFR Non-African American 10 (A) >60    GFR  12 (A) >60    Calcium 9.4 8.3 - 10.6 mg/dL   SPECIMEN REJECTION   Result Value Ref Range    Rejected Test cbcwd     Reason for Rejection see below    Potassium   Result Value Ref Range    Potassium 6.5 (HH) 3.5 - 5.1 mmol/L   CBC Auto Differential   Result Value Ref Range    WBC 12.1 (H) 4.0 - 11.0 K/uL    RBC 3.34 (L) 4.20 - 5.90 M/uL    Hemoglobin 9.8 (L) 13.5 - 17.5 g/dL    Hematocrit 30.7 (L) 40.5 - 52.5 %    MCV 92.0 80.0 - 100.0 fL    MCH 29.4 26.0 - 34.0 pg    MCHC 32.0 31.0 - 36.0 g/dL    RDW 14.8 12.4 - 15.4 %    Platelets 921 701 - 793 K/uL    MPV 8.1 5.0 - 10.5 fL    Neutrophils % 71.4 %    Lymphocytes % 17.4 %    Monocytes % 5.4 %    Eosinophils % 4.5 %    Basophils % 1.3 %    Neutrophils Absolute 8.6 (H) 1.7 - 7.7 K/uL    Lymphocytes Absolute 2.1 1.0 - 5.1 K/uL    Monocytes Absolute 0.6 0.0 - 1.3 K/uL    Eosinophils Absolute 0.5 0.0 - 0.6 K/uL    Basophils Absolute 0.2 0.0 - 0.2 K/uL   Basic Metabolic Panel w/ Reflex to MG   Result Value Ref Range    Sodium 131 (L) 136 - 145 mmol/L Potassium reflex Magnesium 4.7 3.5 - 5.1 mmol/L    Chloride 92 (L) 99 - 110 mmol/L    CO2 27 21 - 32 mmol/L    Anion Gap 12 3 - 16    Glucose 132 (H) 70 - 99 mg/dL    BUN 45 (H) 7 - 20 mg/dL    CREATININE 5.5 (HH) 0.9 - 1.3 mg/dL    GFR Non- 11 (A) >60    GFR  13 (A) >60    Calcium 9.0 8.3 - 10.6 mg/dL   CBC auto differential   Result Value Ref Range    WBC 8.8 4.0 - 11.0 K/uL    RBC 3.45 (L) 4.20 - 5.90 M/uL    Hemoglobin 10.3 (L) 13.5 - 17.5 g/dL    Hematocrit 31.1 (L) 40.5 - 52.5 %    MCV 90.0 80.0 - 100.0 fL    MCH 29.7 26.0 - 34.0 pg    MCHC 33.0 31.0 - 36.0 g/dL    RDW 14.2 12.4 - 15.4 %    Platelets 136 982 - 762 K/uL    MPV 7.8 5.0 - 10.5 fL    Neutrophils % 75.8 %    Lymphocytes % 12.3 %    Monocytes % 6.5 %    Eosinophils % 4.6 %    Basophils % 0.8 %    Neutrophils Absolute 6.6 1.7 - 7.7 K/uL    Lymphocytes Absolute 1.1 1.0 - 5.1 K/uL    Monocytes Absolute 0.6 0.0 - 1.3 K/uL    Eosinophils Absolute 0.4 0.0 - 0.6 K/uL    Basophils Absolute 0.1 0.0 - 0.2 K/uL   Hemoglobin A1c   Result Value Ref Range    Hemoglobin A1C 8.6 See comment %    eAG 200.1 mg/dL   CBC Auto Differential   Result Value Ref Range    WBC 6.2 4.0 - 11.0 K/uL    RBC 3.09 (L) 4.20 - 5.90 M/uL    Hemoglobin 9.1 (L) 13.5 - 17.5 g/dL    Hematocrit 28.1 (L) 40.5 - 52.5 %    MCV 90.7 80.0 - 100.0 fL    MCH 29.4 26.0 - 34.0 pg    MCHC 32.5 31.0 - 36.0 g/dL    RDW 14.0 12.4 - 15.4 %    Platelets 210 362 - 338 K/uL    MPV 8.1 5.0 - 10.5 fL    Neutrophils % 70.7 %    Lymphocytes % 15.6 %    Monocytes % 7.6 %    Eosinophils % 5.0 %    Basophils % 1.1 %    Neutrophils Absolute 4.4 1.7 - 7.7 K/uL    Lymphocytes Absolute 1.0 1.0 - 5.1 K/uL    Monocytes Absolute 0.5 0.0 - 1.3 K/uL    Eosinophils Absolute 0.3 0.0 - 0.6 K/uL    Basophils Absolute 0.1 0.0 - 0.2 K/uL   Basic Metabolic Panel   Result Value Ref Range    Sodium 130 (L) 136 - 145 mmol/L    Potassium 4.5 3.5 - 5.1 mmol/L    Chloride 90 (L) 99 - 110 mmol/L CO2 28 21 - 32 mmol/L    Anion Gap 12 3 - 16    Glucose 146 (H) 70 - 99 mg/dL    BUN 57 (H) 7 - 20 mg/dL    CREATININE 6.0 (HH) 0.9 - 1.3 mg/dL    GFR Non-African American 10 (A) >60    GFR  12 (A) >60    Calcium 8.8 8.3 - 10.6 mg/dL   CBC   Result Value Ref Range    WBC 6.0 4.0 - 11.0 K/uL    RBC 3.05 (L) 4.20 - 5.90 M/uL    Hemoglobin 9.0 (L) 13.5 - 17.5 g/dL    Hematocrit 27.1 (L) 40.5 - 52.5 %    MCV 89.0 80.0 - 100.0 fL    MCH 29.6 26.0 - 34.0 pg    MCHC 33.3 31.0 - 36.0 g/dL    RDW 14.2 12.4 - 15.4 %    Platelets 950 580 - 954 K/uL    MPV 8.4 5.0 - 10.5 fL   Basic metabolic panel   Result Value Ref Range    Sodium 131 (L) 136 - 145 mmol/L    Potassium 4.3 3.5 - 5.1 mmol/L    Chloride 90 (L) 99 - 110 mmol/L    CO2 27 21 - 32 mmol/L    Anion Gap 14 3 - 16    Glucose 143 (H) 70 - 99 mg/dL    BUN 63 (H) 7 - 20 mg/dL    CREATININE 6.6 (HH) 0.9 - 1.3 mg/dL    GFR Non-African American 9 (A) >60    GFR  11 (A) >60    Calcium 8.7 8.3 - 10.6 mg/dL   POCT Glucose   Result Value Ref Range    POC Glucose 165 (H) 70 - 99 mg/dl    Performed on ACCU-CHEK    POCT Glucose   Result Value Ref Range    POC Glucose 158 (H) 70 - 99 mg/dl    Performed on ACCU-CHEK    POCT Glucose   Result Value Ref Range    POC Glucose 139 (H) 70 - 99 mg/dl    Performed on ACCU-CHEK    POCT Glucose   Result Value Ref Range    POC Glucose 121 (H) 70 - 99 mg/dl    Performed on ACCU-CHEK    POCT Glucose   Result Value Ref Range    POC Glucose 133 (H) 70 - 99 mg/dl    Performed on ACCU-CHEK    POCT Glucose   Result Value Ref Range    POC Glucose 227 (H) 70 - 99 mg/dl    Performed on ACCU-CHEK    POCT Glucose   Result Value Ref Range    POC Glucose 92 70 - 99 mg/dl    Performed on ACCU-CHEK    POCT Glucose   Result Value Ref Range    POC Glucose 212 (H) 70 - 99 mg/dl    Performed on ACCU-CHEK    POCT Glucose   Result Value Ref Range    POC Glucose 164 (H) 70 - 99 mg/dl    Performed on ACCU-CHEK    POCT Glucose   Result Value Ref Range    POC Glucose 139 (H) 70 - 99 mg/dl    Performed on ACCU-CHEK    POCT Glucose   Result Value Ref Range    POC Glucose 198 (H) 70 - 99 mg/dl    Performed on ACCU-CHEK    POCT Glucose   Result Value Ref Range    POC Glucose 177 (H) 70 - 99 mg/dl    Performed on ACCU-CHEK    POCT Glucose   Result Value Ref Range    POC Glucose 175 (H) 70 - 99 mg/dl    Performed on ACCU-CHEK    POCT Glucose   Result Value Ref Range    POC Glucose 98 70 - 99 mg/dl    Performed on ACCU-CHEK    POCT Glucose   Result Value Ref Range    POC Glucose 160 (H) 70 - 99 mg/dl    Performed on ACCU-CHEK    POCT Glucose   Result Value Ref Range    POC Glucose 243 (H) 70 - 99 mg/dl    Performed on ACCU-CHEK    POCT Glucose   Result Value Ref Range    POC Glucose 187 (H) 70 - 99 mg/dl    Performed on ACCU-CHEK    EKG 12 Lead   Result Value Ref Range    Ventricular Rate 42 BPM    Atrial Rate 33 BPM    QRS Duration 96 ms    Q-T Interval 494 ms    QTc Calculation (Bazett) 412 ms    R Axis 38 degrees    T Axis 72 degrees    Diagnosis       Junctional bradycardiaLow voltage in the limb leadsAbnormal ECGWhen compared with ECG of 10-DEC-2020 20:47,Junctional rhythm has replaced Sinus rhythmConfirmed by Wendy Hauser MD, Hope Lutz ([id number]) on 12/30/2020 5:32:20 PM       RADIOLOGY  X-RAYS:  I have reviewed radiologic plain film image(s). ALL OTHER NON-PLAIN FILM IMAGES SUCH AS CT, ULTRASOUND AND MRI HAVE BEEN READ BY THE RADIOLOGIST. CTA ABDOMINAL AORTA W BILAT RUNOFF W CONTRAST   Final Result   Vascular-      Aortoiliac inflow disease. On the right, the iliac stents are distally   occluded. On the left there is moderate stenosis the proximal common iliac   artery with patent distal stents. Femoropopliteal disease, multifocal bilaterally. On the right there are   multiple mild-to-moderate stenoses and severe stenosis adductor canal and   very distal popliteal.  On the left, there are multiple mild-to-moderate   stenoses.   There appears to system and may contain errors related to that system including errors in grammar, punctuation, and spelling, as well as words and phrases that may be inappropriate. When dictating, effort is made to correct spelling/grammar errors. If there are any questions or concerns please feel free to contact the dictating provider for clarification.      Aleksandar Tirado DO  ATTENDING, [id number] [facility],   01/03/21 [id number]

## 2020-12-30 NOTE — PROGRESS NOTES
RESPIRATORY THERAPY ASSESSMENT    Name:  Juan Ramon Brown Record Number:  [id number]  Age: 44 y.o. Gender: male  : 1970  Today's Date:  2020  Room:  0363/0363-02    Assessment     Is the patient being admitted for a COPD or Asthma exacerbation?  no  (If yes the patient will be seen every 4 hours for the first 24 hours and then reassessed)    Patient Admission Diagnosis      Allergies  Allergies   Allergen Reactions    Morphine Nausea And Vomiting       Minimum Predicted Vital Capacity:     0          Actual Vital Capacity:      0              Pulmonary History:Asthma  Home Oxygen Therapy:  room air  Home Respiratory Therapy:Albuterol   Current Respiratory Therapy:  Albuterol PRN and Bevespi BID          Respiratory Severity Index(RSI)   Patients with orders for inhalation medications, oxygen, or any therapeutic treatment modality will be placed on Respiratory Protocol. They will be assessed with the first treatment and at least every 72 hours thereafter. The following severity scale will be used to determine frequency of treatment intervention.     Smoking History: Pulmonary Disease or Smoking History, Greater than 15 pack year = 2    Social History  Social History     Tobacco Use    Smoking status: Current Every Day Smoker     Packs/day: 1.00     Years: 33.00     Pack years: 33.00     Types: Cigarettes     Last attempt to quit: 2020     Years since quittin.6    Smokeless tobacco: Never Used    Tobacco comment: TRYING TO QUIT 3-7 a day   Substance Use Topics    Alcohol use: Not Currently     Alcohol/week: 0.0 standard drinks     Comment: occ    Drug use: No       Recent Surgical History: None = 0  Past Surgical History  Past Surgical History:   Procedure Laterality Date    AORTIC VALVE REPLACEMENT N/A 10/15/2019    TRANSCATHETER AORTIC VALVE REPLACEMENT FEMORAL APPROACH performed by Sheri Coffman MD at [facility] Right 2019    PERITONEAL DIALYSIS CATHETER REMOVAL performed by Eloise Cruz MD at [facility] COLONOSCOPY  2/29/2015    WNL    CORONARY ANGIOPLASTY WITH STENT PLACEMENT  05/26/15    CYST REMOVAL  08/14/2013    EXCISION CYSTS, NECK X2 AND ABDOMINAL benign    DIAGNOSTIC CARDIAC CATH LAB PROCEDURE      DIALYSIS FISTULA CREATION Left 10/30/2017    LEFT BRACHIAL CEPHALIC FISTULA    DIALYSIS FISTULA CREATION Left 3/27/2019    LIGATION  AV FISTULA performed by Ruchi Izaguirre MD at [facility], COLON, DIAGNOSTIC      OTHER SURGICAL HISTORY  02/01/2017    laparoscopic cholecystectomy with intraoperative cholangiogram    OTHER SURGICAL HISTORY  2018    PORT PLACEMENT  - vas cath    OTHER SURGICAL HISTORY Bilateral 06/26/2018    laprascopic peritoneal dialysis catheter placement    OTHER SURGICAL HISTORY Right 09/2018    peritoneal dialysis port placed on right side of abdomen    OTHER SURGICAL HISTORY  05/28/2019    PTA/Stenting R External Iliac artery    ND LAP INSERTION TUNNELED INTRAPERITONEAL CATHETER N/A 9/21/2018    LAPAROSCOPIC PERITONEAL DIALYSIS CATHETER REPLACEMENT performed by Eloise Cruz MD at [address] [place] [address]  01/06/2016    UPPER GASTROINTESTINAL ENDOSCOPY  01/29/2017    possible candida, otherwise normal appearing    VASCULAR SURGERY  aprx 2 years ago    2 stents placed, each side of groin       Level of Consciousness: Alert, Oriented, and Cooperative = 0    Level of Activity: Walking unassisted = 0    Respiratory Pattern: Regular Pattern; RR 8-20 = 0    Breath Sounds: Diminshed bilaterally and/or crackles = 2    Sputum   ,  ,    Cough: Strong, spontaneous, non-productive = 0    Vital Signs   BP (!) 112/59   Pulse (!) 45   Temp 97.5 °F (36.4 °C) (Oral)   Resp 18   Ht 5' 9\" (1.753 m)   Wt 240 lb (108.9 kg)   SpO2 99%   BMI 35.44 kg/m²   SPO2 (COPD values may differ): Greater than or equal to 92% on room air = 0    Peak Flow (asthma only): not applicable = 0    RSI: 0-4 = See once and convert to home regimen or discontinue        Plan       Goals: medication delivery, mobilize retained secretions, volume expansion and improve oxygenation    Patient/caregiver was educated on the proper method of use for Respiratory Care Devices:  Yes      Level of patient/caregiver understanding able to:   ? Verbalize understanding   ? Demonstrate understanding       ? Teach back        ? Needs reinforcement       ? No available caregiver               ? Other:     Response to education:  Good     Is patient being placed on Home Treatment Regimen? Yes     Does the patient have everything they need prior to discharge? Yes     Comments: chart reviewed    Plan of Care: cont. Home regimen    Electronically signed by Francis Quintanilla RCP on 12/30/2020 at 12:29 PM    Respiratory Protocol Guidelines     1. Assessment and treatment by Respiratory Therapy will be initiated for medication and therapeutic interventions upon initiation of aerosolized medication. 2. Physician will be contacted for respiratory rate (RR) greater than 35 breaths per minute. Therapy will be held for heart rate (HR) greater than 140 beats per minute, pending direction from physician. 3. Bronchodilators will be administered via Metered Dose Inhaler (MDI) with spacer when the following criteria are met:  a. Alert and cooperative     b. HR < 140 bpm  c. RR < 30 bpm                d. Can demonstrate a 2-3 second inspiratory hold  4. Bronchodilators will be administered via Hand Held Nebulizer [facility]) to patients when ANY of the following criteria are met  a. Incognizant or uncooperative          b. Patients treated with HHN at Home        c. Unable to demonstrate proper use of MDI with spacer     d. RR > 30 bpm   5. Bronchodilators will be delivered via Metered Dose Inhaler (MDI), HHN, Aerogen to intubated patients on mechanical ventilation.   6. Inhalation medication orders will be delivered and/or substituted as outlined below. Aerosolized Medications Ordering and Administration Guidelines:    1. All Medications will be ordered by a physician, and their frequency and/or modality will be adjusted as defined by the patients Respiratory Severity Index (RSI) score. 2. If the patient does not have documented COPD, consider discontinuing anticholinergics when RSI is less than 9.  3. If the bronchospasm worsens (increased RSI), then the bronchodilator frequency can be increased to a maximum of every 4 hours. If greater than every 4 hours is required, the physician will be contacted. 4. If the bronchospasm improves, the frequency of the bronchodilator can be decreased, based on the patient's RSI, but not less than home treatment regimen frequency. 5. Bronchodilator(s) will be discontinued if patient has a RSI less than 9 and has received no scheduled or as needed treatment for 72  Hrs. Patients Ordered on a Mucolytic Agent:    1. Must always be administered with a bronchodilator. 2. Discontinue if patient experiences worsened bronchospasm, or secretions have lessened to the point that the patient is able to clear them with a cough. Anti-inflammatory and Combination Medications:    1. If the patient lacks prior history of lung disease, is not using inhaled anti-inflammatory medication at home, and lacks wheezing by examination or by history for at least 24 hours, contact physician for possible discontinuation.

## 2020-12-30 NOTE — PROGRESS NOTES
Patient admitted to room from ED. Bedside report received. Patient oriented to room, call light, bed rails, phone, lights and bathroom. Patient instructed about fire schedule of the day including:  Vital sign, frequency, lab draws, possible tests, frequency of MD and staff rounds. Patient instructed about precribed diet, how/when to call for meal service, and television. Telemetry box in place, patient aware of placement and reason. Bed locked, in lowest position, side rails up 2/4, call light within reach.

## 2020-12-30 NOTE — ED NOTES
Dr Diane Faust on phone with Vascular Dr Ladd Holter.  Ok to eat breakfast.     Elpidio Walton RN  12/30/20 0808

## 2020-12-30 NOTE — ED PROVIDER NOTES
Emergency Department Attending Provider Note  Location: [address] [place] [address]  ED  12/30/2020     Patient Identification  Michael Disla is a 44 y.o. male      Michael Disla was evaluated in the Emergency Department for leg pain and recurrent falls. Diabetic with diabetic neuropathy of the lower extremities. Reports he fell today and has sacral pain. Initially seen by my colleague see his note for initial documentation. Caroline Mixon Physical exam revealed:  Vital signs reviewed  Gen: Alert and oriented, no acute distress  Card: RRR, no murmurs, equal radial pulses  Resp: CBSBL, no wheezes rales or rhonchi  Abd: Soft nontender, nondistended abdomen, no guarding or rebound, no CVA tenderness  Ext: No deformities noted, feet are warm to palpation, capillary refill 3 seconds and equal  Neuro: Grossly normal moving extremities equally, +5 dorsal and plantar flexion bilaterally. EKG Interpretation  Junctional bradycardic rhythm rate 45, normal axis, no diagnostic ischemic changes noted, . Patient seen and evaluated. Relevant records reviewed. Patient was planned dischargedafter reassuring evaluation in the emergency department. Please see Gabe Barkley note. However patient now stating he is unable to stand or ambulate on his own. He lives at home alone. He is requesting nursing facility placement. He attempted to ambulate with assistance and is unable to do so. Therefore we will contact case management, PT OT to try and arrange placement. Additionally his neuropathy appears to be constant however he also endorses claudication-like pains in both extremities. He has positive Doppler pulses on the right side however unable to appreciate any Doppler pulses on the left side. He does have a warm extremity which is equal to the other side however there may be some underlying claudication as a factor to his symptoms today.   I discussed the case with Dr. Oscar Ortiz, who recommends arterial duplex while admitted today and he will follow up in consult. I am unable to calculate ABIs as he has a fistula on the left upper extremity. I have low concern for ischemic leg at this point. He is also bradycardic asymptomatic in the emergency department. His EKG here is now showing a junctional bradycardic rhythm which appears new from prior EKG approximately 2 weeks ago. May benefit from cardiology consult. We will plan to admit for further management. 8:55 AM    Total critical care time is 15 minutes, which excludes separately billable procedures and updating family. Time spent is specifically for management of the presenting complaint and symptoms initially, direct bedside care, reevaluation, review of records, and consultation. There was a high probability of clinically significant life-threatening deterioration in the patient's condition, which required my urgent intervention. Although initial history and physical exam information was obtained by JOSIANE/NPP/MD/ (who also dictated a record of this visit), I independently examined and evaluated this patient and made all diagnostic, treatment, and disposition decisions. This chart was generated in part by using Dragon Dictation system and may contain errors related to that system including errors in grammar, punctuation, and spelling, as well as words and phrases that may be inappropriate. If there are any questions or concerns please feel free to contact the dictating provider for clarification.      Mell Ha MD   [facility]   Progress note     6:31 AM       Mell Ha MD  12/30/20 0129       Mell aH MD  12/30/20 Henrey Jeans, MD  12/30/20 0850

## 2020-12-30 NOTE — ED NOTES
Bed: G20  Expected date: 7/1/18  Expected time: 10:25 AM  Means of arrival: [facility] ([phone]  Comments:  83 Y M coming from [facility] hospice. Needs [place] placement. Hospice RN unable to place. Pt abd is distended. Axo3 208/98 91 18 91RA gsc 14   Lab in to [facility]     Robert Mejia RN  12/30/20 0387

## 2020-12-30 NOTE — CONSULTS
Thank you to requesting provider:  Dr. Emerson Silva  , for asking us to see Juliana Romero  Reason for consultation:  ESRD and hyperkalemia  Chief Complaint:  Dizzy and weak    History of Presenting Illness      43 y/o with history of ESRD on dialysis with recent admission due to weakness and numbness in his legs. He was seen by neurology and he did have MRI as well as LP and diagnosis was more fitting with neuropathy. He was initially set to go to a nursing home but then says his feeling came back and he wanted to go home. Over the past week his weakness has returned. Yesterday he feel at home. Has been dizzy and weak today. C/o sores over his ankles from the socks. K is 6.8 and he is bradycardic.         Past Medical/Surgical History      Active Ambulatory Problems     Diagnosis Date Noted    Acute respiratory failure with hypoxia and hypercapnia (HCC) 05/14/2013    Chronic dCHF (grade 2 LVDD) 05/14/2013    Chronic obstructive pulmonary disease ([address] 05/14/2013    CAD (coronary artery disease) 05/14/2013    PVD (peripheral vascular disease) ([address] 05/14/2013    Nonischemic cardiomyopathy ([address] 05/22/2013    Sleep apnea 06/03/2013    Bicuspid aortic valve 06/03/2013    Bilateral hilar adenopathy syndrome 06/03/2013    Claudication in peripheral vascular disease ([address] 06/26/2013    Essential hypertension 11/14/2013    Diabetic neuropathy (HCC) 11/14/2013    Type 2 diabetes, uncontrolled, with neuropathy ([address] 03/04/2014    Passive smoke exposure 05/30/2014    Depression with anxiety 06/05/2014    Hematoma 03/17/2015    Pneumonia of right upper lobe due to infectious organism 03/28/2015    DM (diabetes mellitus), secondary, uncontrolled, w/neurologic complic ([address] 74/31/7692    Hypertensive heart disease with congestive heart failure with preserved left ventricular function ([address] 04/24/2015    CHF exacerbation ([address] 05/24/2015    Chest pain     Coronary artery disease involving native coronary artery of native heart without angina pectoris     Obesity (BMI 30-39. 9)     ZAINAB on CPAP 02/11/2016    Degeneration of lumbar or lumbosacral intervertebral disc 03/18/2016    Lumbar radiculopathy 03/18/2016    Lumbosacral spondylosis without myelopathy 17/94/6184    Biliary colic 25/41/6909    Symptomatic cholelithiasis 01/04/2017    Gastroparesis due to DM ([address]     Angina, class IV ([facility])     Dyspnea     Elevated brain natriuretic peptide (BNP) level     Dyslipidemia     Respiratory distress     Hypoxia     Chest pressure 02/17/2017    Hypertensive urgency 02/17/2017    Acute on chronic combined systolic and diastolic heart failure (HCC)     Ischemic cardiomyopathy     Chronic renal failure, stage 5 ([address] 03/07/2017    Tobacco abuse 05/21/2017    CKD stage 4 due to type 2 diabetes mellitus ([address] 05/21/2017    CVA (cerebral vascular accident) ([address] 05/21/2017    Arterial ischemic stroke, ICA, right, acute ([address]     Type 2 diabetes mellitus without complication, without long-term current use of insulin ([address]     HTN (hypertension), benign     ZAINAB (obstructive sleep apnea)     Diarrhea 08/04/2017    Pleural effusion     Chronic anemia     Nonrheumatic aortic valve stenosis     Hypervolemia     Hyperkalemia     Mucus plugging of bronchi     Hemodialysis-associated hypotension 11/22/2017    Left arm pain 11/22/2017    Dizziness 11/22/2017    ESRD (end stage renal disease) on dialysis ([address] 11/22/2017    Hypotension due to drugs 11/25/2017    Acute diastolic CHF (congestive heart failure) ([address] 03/18/2018    Neuromuscular disorder (HCC)     Acute combined systolic and diastolic CHF, NYHA class 4 ([address] 09/14/2018    Renovascular hypertension     Mixed hyperlipidemia     Cigarette nicotine dependence in remission     Acute respiratory failure ([address] 11/09/2018    Pulmonary edema 11/09/2018    Fluid overload 65/82/9108    Diastolic dysfunction 44/27/2352    Anemia of chronic disease 11/12/2018    SOB (shortness of breath) 12/24/2018    Steal syndrome of dialysis vascular access (HCC)     Chronic, continuous use of opioids 04/15/2019    Chronic bronchitis [facility] 05/21/2019    Nasal congestion 05/21/2019    Hypercholesteremia 07/30/2019    Bradycardia 10/19/2019    S/P TAVR (transcatheter aortic valve replacement) 10/19/2019    Syncope and collapse 10/19/2019    Atrial fibrillation ([facility]     Former smoker 11/19/2019    Generalized weakness 02/04/2020    DKA, type 1, not at goal [facility]) 04/25/2020    Bilateral leg weakness 12/04/2020    GBS (Guillain-[place] syndrome) ([address]     Sinus pause      Resolved Ambulatory Problems     Diagnosis Date Noted    Sepsis ([facility] 12/25/2012    CHF, acute on chronic ([facility] 05/12/2013    Leukocytosis 05/14/2013    PVD (peripheral vascular disease) ([facility] 06/26/2013    Abscess 07/22/2013    Sebaceous cyst 07/22/2013    Wound infection after surgery 08/30/2013    Postop check 08/30/2013    Insomnia 06/05/2014    Sore throat 11/03/2014    Sinusitis, acute 02/05/2015    Abscess, abdomen 03/10/2015    Pre-operative cardiovascular examination     Elevated troponin     Non morbid obesity due to excess calories     Tobacco abuse 03/25/2020    Obesity 11/03/2020    Pain     ESRD on peritoneal dialysis ([facility] 11/09/2018    Preoperative cardiovascular examination 04/04/2019    Syncope 10/19/2019     Past Medical History:   Diagnosis Date    Ambulatory dysfunction     Aortic stenosis     Arthritis     Asthma     Cardiomyopathy ([facility] 04/19/2019    Chronic pain     COPD (chronic obstructive pulmonary disease) (HCC)     Depression     Diabetes mellitus (HCC)     Difficult intravenous access     Emphysema of lung (HCC)     Fear of needles     Gastric ulcer     GERD (gastroesophageal reflux disease)     Heart valve problem     Hemodialysis patient ([facility]     History of spinal fracture     Hx of blood clots     Hyperlipidemia     Hypertension     MI (myocardial infarction) ([facility] 2019    Numbness and tingling in left arm     Pneumonia     PONV (postoperative nausea and vomiting)     Prolonged emergence from general anesthesia     Stroke ([facility]     TIA (transient ischemic attack)     Unspecified diseases of blood and blood-forming organs          Review of Systems     Constitutional:  No weight loss, no fever/chills  Eyes:  No eye pain, no eye redness  Cardiovascular:  No chest pain, + edema  Respiratory:  No hemoptysis, chronic CASH  Gastrointestinal:  No blood in stool, no n/v, no diarrhea  Genitoruinary:  No hematuria, not making much urine   Musculoskeletal:  No joint swelling, no redness  Integumentary:  No Rash, no itching  Neurological:  + LE weakness, Legs are numb   Psychiatric:  No depression, no confusion  Endocrine:  No polyuria, no polydipsia       Medications      Reviewed in EMR     Allergies     Morphine      Family History       Negative for Kidney Disease    Social History      Social History     Socioeconomic History    Marital status:      Spouse name: None    Number of children: None    Years of education: None    Highest education level: None   Occupational History    None   Social Needs    Financial resource strain: None    Food insecurity     Worry: None     Inability: None    Transportation needs     Medical: None     Non-medical: None   Tobacco Use    Smoking status: Current Every Day Smoker     Packs/day: 1.00     Years: 33.00     Pack years: 33.00     Types: Cigarettes     Last attempt to quit: 2020     Years since quittin.6    Smokeless tobacco: Never Used    Tobacco comment: TRYING TO QUIT 3-7 a day   Substance and Sexual Activity    Alcohol use: Not Currently     Alcohol/week: 0.0 standard drinks     Comment: occ    Drug use: No    Sexual activity: Yes     Partners: Female     Comment:    Lifestyle    Physical activity     Days per week: None ESRD and diet     Plan:    Urgent dialysis today with 1 k bath  Follow labs  See if HR and dizziness improve after dialysis.   Weakness may improve too but seems that he will need placement     -----------------------------  Debra Mello M.D.   [facility]

## 2020-12-30 NOTE — ED NOTES
In room to assess pt and assist with discharge. Pt states\" I'm unable to walk and won't be able to get around at my house. \" Pt states pain is still uncontrolled. Pt made mention of needing assistance r/t unable to care for himself. Dr Yodit Mendoza made aware.      Kodi Ha RN  12/30/20 0131

## 2020-12-30 NOTE — PROGRESS NOTES
Physical Therapy    Facility/Department: [facility]  ED  Initial Assessment/Treatment    NAME: Candace Garcia  : 1970  MRN: [id number]    Date of Service: 2020    Discharge Recommendations:  Subacute/Skilled Nursing Facility   PT Equipment Recommendations  Equipment Needed: No  Other: Defer to facility. Pt does own motorized scooter and rollator. Assessment   Body structures, Functions, Activity limitations: Decreased functional mobility ; Decreased strength;Decreased ROM; Decreased endurance;Decreased balance;Decreased sensation; Increased pain  Assessment: Pt is a 44year old male with PMH including CAD, AVR, ESRD on HD, CVA with residual L deficits, DM, CHF who presented to ED with progressive numbness in legs and hands leading to multiple falls at home and inability to ambulate. Pt was admitted to [facility] from - for similar symptoms. At that time, MRI of spine demonsrated L5-S1 disc protrusion, (-)epidural abscess, lumbar puncture was unremarkable and pt demonstrated improvement in mobility with therapy. It was recommended he d/c to SNF which he refused. Upon evaluation, pt continues to present with above deficits most notably impaired sensation B feet, weakness, and increased pain making pt high fall risk. Pt will benefit from skilled PT while admitted and at d/c when medically appropriate. Recommend SNF. Treatment Diagnosis: Impaired sensation and weakness BLEs. Prognosis: Fair  Decision Making: Medium Complexity  PT Education: Goals;PT Role;Plan of Care;Transfer Training;General Safety;Gait Training;Disease Specific Education; Functional Mobility Training  REQUIRES PT FOLLOW UP: Yes  Activity Tolerance  Activity Tolerance: Patient limited by pain; Patient limited by endurance  Activity Tolerance: BP supine 112/59, sitting 96/57 with +lightheadedness. Pt is limited by back pain that radiates into R hip and overall strength, endurance.  Bradycardic at rest (41-43bpm) which increased to 67bpm with activity. Patient Diagnosis(es): The primary encounter diagnosis was Fall, initial encounter. A diagnosis of Diabetic polyneuropathy associated with type 2 diabetes mellitus ([address] was also pertinent to this visit. has a past medical history of Ambulatory dysfunction, Aortic stenosis, Arthritis, Asthma, Bilateral hilar adenopathy syndrome, CAD (coronary artery disease), Cardiomyopathy ([address] CHF (congestive heart failure) ([address] Chronic pain, COPD (chronic obstructive pulmonary disease) ([address] Depression, Diabetes mellitus ([address] Difficult intravenous access, Emphysema of lung ([address] ESRD (end stage renal disease) on dialysis ([address] Fear of needles, Gastric ulcer, GERD (gastroesophageal reflux disease), Heart valve problem, Hemodialysis patient ([address] History of spinal fracture, Hx of blood clots, Hyperlipidemia, Hypertension, MI (myocardial infarction) ([address] Neuromuscular disorder ([address] Numbness and tingling in left arm, Pneumonia, PONV (postoperative nausea and vomiting), Prolonged emergence from general anesthesia, Sleep apnea, Stroke ([address] TIA (transient ischemic attack), and Unspecified diseases of blood and blood-forming organs. has a past surgical history that includes Tonsillectomy; cyst removal (08/14/2013); Colonoscopy; Coronary angioplasty with stent (05/26/15); vascular surgery (aprx 2 years ago); Colonoscopy (2/29/2015); Upper gastrointestinal endoscopy (01/06/2016); Upper gastrointestinal endoscopy (01/29/2017); other surgical history (02/01/2017); Dialysis fistula creation (Left, 10/30/2017); Diagnostic Cardiac Cath Lab Procedure; other surgical history (2018); other surgical history (Bilateral, 06/26/2018); pr lap insertion tunneled intraperitoneal catheter (N/A, 9/21/2018); other surgical history (Right, 09/2018); Dialysis fistula creation (Left, 3/27/2019); other surgical history (05/28/2019);  Endoscopy, colon, diagnostic; Catheter Removal (Right, 7/2/2019); 1  Surface: level tile  Device: Rolling Walker  Assistance: Moderate assistance;2 Person assistance  Quality of Gait: Pt reports he is unable to feel his feet on the floor, demonsrates wide ALANNAH, inc BUE reliance on RW, decreased step length and clearance bilaterally, sliding BLEs vs stepping. Distance: 2ft - 3 small steps at edge of bed to R. Stairs/Curb  Stairs?: No     Balance  Posture: Fair  Sitting - Static: Good;-  Sitting - Dynamic: Fair  Standing - Static: Poor  Standing - Dynamic: Poor  Comments: Pt tolerated ~30 seconds total standing with mod A x 2 and wide ALANNAH. Plan   Plan  Times per week: 3-5x/wk  Times per day: Daily  Current Treatment Recommendations: Strengthening, Balance Training, Transfer Training, Gait Training, Functional Mobility Training, Endurance Training, Safety Education & Training, Patient/Caregiver Education & Training  Safety Devices  Type of devices: All fall risk precautions in place, Bed alarm in place, Call light within reach, Gait belt, Patient at risk for falls, Left in bed, Nurse notified  Restraints  Initially in place: No    AM-PAC Score     AM-PAC Inpatient Mobility without Stair Climbing Raw Score : 8 (12/30/20 1003)  AM-PAC Inpatient without Stair Climbing T-Scale Score : 30.65 (12/30/20 1003)  Mobility Inpatient CMS 0-100% Score: 80.91 (12/30/20 1003)  Mobility Inpatient without Stair CMS G-Code Modifier : CM (12/30/20 1003)       Goals  Short term goals  Time Frame for Short term goals: 1 week, 1/6/21 unless otherwise noted. Short term goal 1: Pt will perform bed mobility with supervision  Short term goal 2: Pt will perform sit<>stand transfer with CGA  Short term goal 3: Pt will ambulate 25ft with LRAD and min A  Short term goal 4: By 1/2/21 pt will tolerate x10-12 reps BLE exercise to assist with functional transfers and ambulation. Patient Goals   Patient goals : \"To go to rehab and get stronger. \"       Therapy Time   Individual Concurrent Group Co-treatment Time In 0813         Time Out 0841         Minutes 28         Timed Code Treatment Minutes: 18 Minutes(10 minute evaluation)       Sarah Raymundo, PT    If pt is unable to be seen after this session, please let this note serve as discharge summary. Please see case management note for discharge disposition. Thank you.

## 2020-12-30 NOTE — PROGRESS NOTES
History of spinal fracture, Hx of blood clots, Hyperlipidemia, Hypertension, MI (myocardial infarction) [address] [address] Neuromuscular disorder (HCC), Numbness and tingling in left arm, Pneumonia, PONV (postoperative nausea and vomiting), Prolonged emergence from general anesthesia, Sleep apnea, Stroke ([facility] [address] TIA (transient ischemic attack), and Unspecified diseases of blood and blood-forming organs. has a past surgical history that includes Tonsillectomy; cyst removal (08/14/2013); Colonoscopy; Coronary angioplasty with stent (05/26/15); vascular surgery (aprx 2 years ago); Colonoscopy (2/29/2015); Upper gastrointestinal endoscopy (01/06/2016); Upper gastrointestinal endoscopy (01/29/2017); other surgical history (02/01/2017); Dialysis fistula creation (Left, 10/30/2017); Diagnostic Cardiac Cath Lab Procedure; other surgical history (2018); other surgical history (Bilateral, 06/26/2018); pr lap insertion tunneled intraperitoneal catheter (N/A, 9/21/2018); other surgical history (Right, 09/2018); Dialysis fistula creation (Left, 3/27/2019); other surgical history (05/28/2019); Endoscopy, colon, diagnostic; Catheter Removal (Right, 7/2/2019); and Aortic valve replacement (N/A, 10/15/2019). Treatment Diagnosis: LE weakness and impaired balance      Restrictions  Restrictions/Precautions  Restrictions/Precautions: Fall Risk    Subjective   General  Chart Reviewed: Yes  Patient assessed for rehabilitation services?: Yes  Referring Practitioner: Sam Pascual MD  Diagnosis: LE weakness, fall  Subjective  Subjective: \"My legs are noodles. \"  General Comment  Comments: RN approved therapy. Pt seen in ED. RN reports possible RLE claudication. Patient Currently in Pain: Yes  Pain Assessment  Pain Assessment: 0-10  Pain Level: 9  Pain Type: Acute pain  Pain Location: Leg  Pain Orientation: Right  Pain Descriptors: Stabbing; Sharp  Non-Pharmaceutical Pain Intervention(s): Repositioned; Therapeutic presence  Response to Pain Intervention: Patient Satisfied  Vital Signs  Pulse: (!) 42  Resp: 18  BP: (!) 154/0  Level of Consciousness: Alert (0)  Patient Currently in Pain: Yes  Oxygen Therapy  SpO2: 98 %  Social/Functional History  Social/Functional History  Lives With: Spouse(wife is disabled)  Type of Home: Trailer  Home Layout: One level  Home Access: Ramped entrance  Bathroom Shower/Tub: Walk-in shower  Bathroom Toilet: Standard  Bathroom Equipment: Shower chair, Grab bars in [id number] [place]: 4 wheeled walker, Rolling walker, Electric scooter(adjustable bed)  Receives Help From: Home health(4 hours/day for 5 days/week)  ADL Assistance: Needs assistance(Reports that he sponge bathes due to L chest port. Aide assists with socks and shoes)  Homemaking Assistance: Needs assistance(Aide assists with laundry and meals)  Ambulation Assistance: Needs assistance(Uses either scooter or scoots around while seated on rollator for mobility at home.)  Transfer Assistance: Needs assistance(Aide or family provides assistance as needed for transfers when they are available. Pt states that he has been stuck on toilet or couch because he cannot get up.)  Additional Comments: Reports that he falls 3-4x/day since he was discharged home from hospital last week. Objective   Vision: Impaired  Vision Exceptions: Wears glasses for reading  Hearing: Within functional limits    Orientation  Overall Orientation Status: Within Normal Limits     Balance  Sitting Balance: Stand by assistance  Standing Balance: Dependent/Total(mod x2 with RW)  Standing Balance  Activity: Took side steps to [facility] with RW and mod x2. Pt reports that he does not have feeling in B feet and c/o RLE pain. Returned to supine.   ADL  Feeding: Setup  Grooming: Setup(seated)  LE Dressing: Dependent/Total  Tone RUE  RUE Tone: Normotonic  Tone LUE  LUE Tone: Normotonic  Coordination  Movements Are Fluid And Coordinated: No  Coordination and Movement description: Decreased speed;Decreased accuracy; Left UE;Tremors  Quality of Movement Other  Comment: hx CVA with decreased LUE motor skills     Bed mobility  Supine to Sit: Moderate assistance  Sit to Supine: Moderate assistance;2 Person assistance  Transfers  Stand Step Transfers: Moderate assistance;2 Person assistance(side step to [facility] with RW (few steps))  Sit to stand: Moderate assistance;2 Person assistance  Stand to sit: Moderate assistance;2 Person assistance     Cognition  Overall Cognitive Status: Exceptions  Following Commands: Follows one step commands consistently  Problem Solving: Assistance required to correct errors made;Assistance required to identify errors made        Sensation  Overall Sensation Status: Impaired(Tingling in BUE but sensation intact. Impaired sensation BLE at B toes for deep pressure and light touch.  Lack of light touch in B feet.)      LUE AROM (degrees)  LUE General AROM: L shoulder 0-90*, L elbow to hand WFL  RUE AROM (degrees)  RUE AROM : WFL  LUE Strength  Gross LUE Strength: WFL  RUE Strength  Gross RUE Strength: WFL     Hand Dominance  Hand Dominance: Right      Plan   Plan  Times per week: 3-5x  Current Treatment Recommendations: Self-Care / ADL, Safety Education & Training, Functional Mobility Training, Balance Training, Endurance Training, Equipment Evaluation, Education, & procurement  AM-PAC Score        AM-[facility] Inpatient Daily Activity Raw Score: 13 (12/30/20 0916)  AM-PAC Inpatient ADL T-Scale Score : 32.03 (12/30/20 0916)  ADL Inpatient CMS 0-100% Score: 63.03 (12/30/20 0916)  ADL Inpatient CMS G-Code Modifier : CL (12/30/20 9792)    Goals  Short term goals  Time Frame for Short term goals: 1 week (1/6) unless noted  Short term goal 1: Perform functional transfer with mod A with AD  Short term goal 2: Perform toileting with min A  Short term goal 3: Perform UE exer 15x each to improve endurance by 1/4  Short term goal 4: Stand > 1 min with walker and mod A for ADL tasks  Patient Goals   Patient goals : Brie Ruiz able to walk\"       Therapy Time   Individual Concurrent Group Co-treatment   Time In 0740         Time Out 0800         Minutes 20         Timed Code Treatment Minutes: 10 Minutes(10 min eval)       Catherine Thibodeaux

## 2020-12-30 NOTE — ED NOTES
Attempted to ambulate pt in escobar with walker. Pt stood with assist and was unable to walk stating\"I'm to weak. I dont have a foundation under me, I feel like I'm going to fall. \" Pt assisted to bed call light in reach. Rn offered to call wife and update her. Pt stated he is able to update wife.      Marcela Tapia RN  12/30/20 4916

## 2020-12-30 NOTE — ED NOTES
In room to assess vascular status and DESTINEY. Unable to complete. Pt has dialysis graft In left arm. Left dp/pt doppler signal obtained. Right dp/pt doppler signal absent. Dr Laws Reason aware. OT/PT updated and in room to assess pt.      Sharifa Simons RN  12/30/20 2075

## 2020-12-30 NOTE — ED NOTES
Bed: 11  Expected date:   Expected time:   Means of arrival:   Comments:  NAVJOT [place]ama  12/30/20 6372

## 2020-12-30 NOTE — H&P
Hospital Medicine History & Physical      PCP: Emeli Shaver MD    Date of Admission: 12/30/2020    Date of Service: Pt seen/examined on 12/30/20 and  Placed in Observation. Chief Complaint: Progressive weakness, recurrent falls      History Of Present Illness:      44 y.o. male with history of ESRD on hemodialysis, morbid obesity, NICM, DM with severe peripheral neuropathy,PAD s/p stents, ZAINAB on CPAP presented to emergency room with progressive leg weakness, numbness and recurrent falls. .     The patient was recently admitted to this hospital for similar reason. .. Had extensive work-up by neurology-demyelinating disorder was ruled out, MRI lumbar thoracic/cervical/spine showed  moderate right L5 narrowing with L5 S1 disc protrusion with very mild DJD disease elsewhere. .  Over the last several days he has had progressive weakness in the lower legs with decreased sensation and recurrent falls. . He has been unable to ambulate today. [facility] has some numbness in the hands. . No urinary incontinence or retention. On presentation, BP was 105/67, respirations 16. [facility] Heart rate was 52 but dropped to the 40s, EKG showed junctional bradycardia. . His potassium was elevated at 6.5-6.8..  He is scheduled for hemodialysis today    Past Medical History:          Diagnosis Date    Ambulatory dysfunction     walker for long distances, SOB with distance    Aortic stenosis     echo 2017    Arthritis     hands and hips    Asthma     Bilateral hilar adenopathy syndrome 6/3/2013    CAD (coronary artery disease)     Dr. Nya Garcia [facility]) 04/19/2019    EF= 43%    CHF (congestive heart failure) (HCC)     Chronic pain     COPD (chronic obstructive pulmonary disease) ([address]     pulmonology Dr. Jessa Teague    Depression     Diabetes mellitus ([address]     borderline    Difficult intravenous access     Emphysema of lung ([address]     ESRD (end stage renal disease) on dialysis ([address]     MWF    Fear of needles     Gastric ulcer     GERD (gastroesophageal reflux disease)     Heart valve problem     bicuspic valve    Hemodialysis patient ([address]     History of spinal fracture     work incident    Hx of blood clots     Bilateral lower extremities; stents in place    Hyperlipidemia     Hypertension     MI (myocardial infarction) ([address] 2019    has had 9 MIs. 2019 was the last    Neuromuscular disorder ([address]     due to CVA    Numbness and tingling in left arm     from fistula    Pneumonia     PONV (postoperative nausea and vomiting)     Prolonged emergence from general anesthesia     States requires more medication than most people    Sleep apnea     Uses CPAP    Stroke ([address]     7mm thalamic cva 2017 deficts left side, left side weakness    TIA (transient ischemic attack)     Unspecified diseases of blood and blood-forming organs        Past Surgical History:          Procedure Laterality Date    AORTIC VALVE REPLACEMENT N/A 10/15/2019    TRANSCATHETER AORTIC VALVE REPLACEMENT FEMORAL APPROACH performed by Azul Robertson MD at [address] [place] [address] Right 7/2/2019    PERITONEAL DIALYSIS CATHETER REMOVAL performed by Immanuel Lock MD at [facility] COLONOSCOPY  2/29/2015    WNL    CORONARY ANGIOPLASTY WITH STENT PLACEMENT  05/26/15    CYST REMOVAL  08/14/2013    EXCISION CYSTS, NECK X2 AND ABDOMINAL benign    DIAGNOSTIC CARDIAC CATH LAB PROCEDURE      DIALYSIS FISTULA CREATION Left 10/30/2017    LEFT BRACHIAL CEPHALIC FISTULA    DIALYSIS FISTULA CREATION Left 3/27/2019    LIGATION  AV FISTULA performed by Bowen Dodson MD at [facility], COLON, DIAGNOSTIC      OTHER SURGICAL HISTORY  02/01/2017    laparoscopic cholecystectomy with intraoperative cholangiogram    OTHER SURGICAL HISTORY  2018    PORT PLACEMENT  - vas cath    OTHER SURGICAL HISTORY Bilateral 06/26/2018    laprascopic peritoneal dialysis catheter placement    OTHER SURGICAL HISTORY Right 09/2018    peritoneal dialysis port placed on right side of abdomen    OTHER SURGICAL HISTORY  05/28/2019    PTA/Stenting R External Iliac artery    MS LAP INSERTION TUNNELED INTRAPERITONEAL CATHETER N/A 9/21/2018    LAPAROSCOPIC PERITONEAL DIALYSIS CATHETER REPLACEMENT performed by Salbador Berumen MD at [address] ENDOSCOPY  01/06/2016    UPPER GASTROINTESTINAL ENDOSCOPY  01/29/2017    possible candida, otherwise normal appearing    VASCULAR SURGERY  aprx 2 years ago    2 stents placed, each side of groin       Medications Prior to Admission:      Prior to Admission medications    Medication Sig Start Date End Date Taking? Authorizing Provider   HYDROcodone-acetaminophen (NORCO) 7.5-325 MG per tablet Take 1 tablet by mouth every 6 hours as needed for Pain for up to 3 days. Intended supply: 30 days 12/30/20 1/2/21 Yes Matteo Garcia DO   oxyCODONE-acetaminophen (PERCOCET) 7.5-325 MG per tablet Take 1 tablet by mouth every 6 hours as needed for Pain.    Yes Historical Provider, MD   Insulin Pen Needle 31G X 5 MM MISC 1 each by Does not apply route 4 times daily 12/28/20  Yes JAY Joy CNP   hydrOXYzine (VISTARIL) 50 MG capsule TAKE 1 TO 2 CAPSULES BY MOUTH NIGHTLY 12/21/20  Yes Migue You MD   carvedilol (COREG) 6.25 MG tablet Take 1 tablet by mouth 2 times daily (with meals) 12/18/20  Yes JAY Angel CNP   lidocaine 4 % external patch Place 1 patch onto the skin daily 12/19/20  Yes JAY Angel CNP   hydrALAZINE (APRESOLINE) 50 MG tablet TAKE 1/2 TABLET BY MOUTH EVERY 8 HOURS 11/24/20  Yes Serenity Viera MD   B Complex-C-Folic Acid (VIRT-CAPS) 1 MG CAPS TK ONE C PO  QD 11/18/20  Yes Serenity Viera MD   Continuous Blood Gluc Sensor (FREESTYLE STEPHANY 14 DAY SENSOR) MISC 1 each by Does not apply route every 14 days 11/10/20  Yes Serenity Viera MD   insulin glargine (BASAGLAR KWIKPEN) 100 UNIT/ML injection pen Inject 70 Units into the skin nightly 11/10/20  Yes Migdalia Villar MD   traZODone (DESYREL) 150 MG tablet TAKE (1) TABLET BY MOUTH NIGHTLY 11/4/20  Yes Migdalia Villar MD   citalopram (CELEXA) 40 MG tablet TAKE (1) TABLET BY MOUTH DAILY 11/4/20  Yes Migdalia Villar MD   insulin aspart (NOVOLOG FLEXPEN) 100 UNIT/ML injection pen Inject 20 Units into the skin 3 times daily (before meals) 10/12/20  Yes Migdalia Villar MD   clopidogrel (PLAVIX) 75 MG tablet TAKE 1 TABLET BY MOUTH ONCE DAILY 10/2/20  Yes Vaughn West MD   pravastatin (PRAVACHOL) 80 MG tablet TAKE (1) TABLET BY MOUTH ONCE DAILY 9/29/20  Yes Vaughn West MD   QUEtiapine (SEROQUEL) 50 MG tablet TAKE 1 TABLET BY MOUTH EVERY EVENING 9/28/20  Yes Migdalia Villar MD   isosorbide mononitrate (IMDUR) 30 MG extended release tablet TAKE 1 TABLET BY MOUTH EVERY DAY 9/1/20  Yes Migdalia Villar MD   torsemide (DEMADEX) 100 MG tablet Take 1-2 tablets by mouth daily 8/5/20  Yes Migdalia Villar MD   ondansetron (ZOFRAN ODT) 4 MG disintegrating tablet Take 1 tablet by mouth every 8 hours as needed for Nausea 8/5/20  Yes Migdalia Villar MD   LINZESS 145 MCG capsule TAKE 1 CAPSULE BY MOUTH EVERY MORNING BEFORE BREAKFAST 7/6/20  Yes Migdalia Villar MD   Calcium Acetate, Phos Binder, 667 MG CAPS TAKE 1 CAPSULE BY MOUTH THREE TIMES DAILY WITH MEALS 6/29/20  Yes Migdalia Villar MD   tiZANidine (ZANAFLEX) 4 MG tablet TAKE 1 TABLET BY MOUTH THREE TIMES DAILY 6/4/20  Yes Migdalia Villar MD   DULoxetine (CYMBALTA) 30 MG extended release capsule TAKE 1 CAPSULE BY MOUTH EVERY DAY 4/3/20  Yes JAY Castellon CNP   nystatin-triamcinolone (MYCOLOG II) 065104-8.1 UNIT/GM-% cream Apply topically 2 times daily.  3/16/20  Yes Hal Darting, APRN - CNP   losartan (COZAAR) 50 MG tablet TAKE (1) TABLET BY MOUTH DAILY 3/10/20  Yes Migdalia Villar MD   pantoprazole (PROTONIX) 40 MG tablet TAKE (1) TABLET BY MOUTH EACH MORNING BEFORE BREAKFAST 3/10/20  Yes Migdalia Villar MD albuterol (PROVENTIL) (2.5 MG/3ML) 0.083% nebulizer solution INHALE 1 VIAL VIA NEBULIZER EVERY 6 HOURS AS NEEDED FOR WHEEZING 3/10/20  Yes Leobardo Negrete MD   nystatin (MYCOSTATIN) 785297 UNIT/GM cream Apply topically 2 times daily. 3/4/20  Yes Leobardo Negrete MD   Insulin Syringe-Needle U-100 30G X 1/2\" 0.5 ML MISC 1 each by Does not apply route daily 3/4/20  Yes Leobardo Negrete MD   nitroGLYCERIN (NITROSTAT) 0.4 MG SL tablet DISSOLVE 1 TABLET UNDER THE TONGUE AS NEEDED FOR CHEST PAIN EVERY 5 MINUTES UP TO 3 TIMES. IF NO RELIEF CALL 911. 2/7/20  Yes Leobardo Negrete MD   blood glucose test strips (FREESTYLE LITE) strip Daily As needed.  8/19/19  Yes Leobardo Negrete MD   glucose monitoring kit (FREESTYLE) monitoring kit 1 kit by Does not apply route daily 8/19/19  Yes Leobardo Negrete MD   vitamin D (ERGOCALCIFEROL) 19494 units CAPS capsule TK 1 C PO WEEKLY 6/2/19  Yes Historical Provider, MD   flunisolide (NASALIDE) 25 MCG/ACT (0.025%) SOLN Inhale 2 sprays into the lungs every 12 hours 5/22/19  Yes Vania Lopes MD   Tiotropium Bromide-Olodaterol (STIOLTO RESPIMAT) 2.5-2.5 MCG/ACT AERS Inhale 2 puffs into the lungs daily 5/21/19  Yes Vania Lopes MD   Polyethylene Glycol 3350 GRAN  5/2/18  Yes Historical Provider, MD   Glucose Blood (BLOOD GLUCOSE TEST STRIPS) STRP TEST 3-4 TIMES DAILY, AS DIRECTED 4/25/18  Yes Amor Mccormick MD   Blood Glucose Monitoring Suppl ADAM USE AS DIRECTED. 4/25/18  Yes Amor Mccormick MD   Alcohol Swabs PADS USE AS DIRECTED 4/25/18  Yes Amor Mccormick MD   albuterol sulfate  (90 Base) MCG/ACT inhaler Inhale 2 puffs into the lungs every 6 hours as needed for Wheezing 11/8/17  Yes Michelle Yuan MD   ipratropium-albuterol (DUONEB) 0.5-2.5 (3) MG/3ML SOLN nebulizer solution Inhale 3 mLs into the lungs every 6 hours as needed for Shortness of Breath 10/15/17  Yes Marcus Glover MD   Lancets MISC Test daily 5/25/17  Yes Honey Freire MD   calcium carbonate (TUMS) 500 MG chewable tablet Take 1 tablet by mouth 3 times daily as needed for Heartburn. Yes Historical Provider, MD   aspirin 81 MG chewable tablet Take 1 tablet by mouth daily. Patient taking differently: Take 81 mg by mouth daily Indications: stopped on 6/25 for surgery  5/14/13  Yes Percy Pierson MD   gabapentin (NEURONTIN) 100 MG capsule Take 1 capsule by mouth 3 times daily for 5 days. 12/18/20 12/23/20  JAY Monroy CNP   pregabalin (LYRICA) 25 MG capsule Take 1 capsule by mouth 3 times daily for 5 days. 12/18/20 12/23/20  JAY Monroy CNP       Allergies:  Morphine    Social History:      The patient currently lives     TOBACCO:   reports that he has been smoking cigarettes. He has a 33.00 pack-year smoking history. He has never used smokeless tobacco.  ETOH:   reports previous alcohol use. Family History:       Reviewed in detail and negative for DM, CAD, Cancer, CVA. Positive as follows:        Problem Relation Age of Onset    Diabetes Mother     Heart Disease Father     Kidney Disease Sister         stage 4-kidney failure    Cancer Sister     Heart Disease Sister     Obesity Sister     Cancer Sister     Heart Disease Sister     Obesity Sister     Alcohol Abuse Brother        REVIEW OF SYSTEMS:   Pertinent positives as noted in the HPI. All other systems reviewed and negative. PHYSICAL EXAM:    BP (!) 102/49   Pulse (!) 45   Temp 97.5 °F (36.4 °C)   Resp 18   Ht 5' 9\" (1.753 m)   Wt 264 lb 5.3 oz (119.9 kg)   SpO2 99%   BMI 39.03 kg/m²     General appearance:  No apparent distress, appears stated age and cooperative. HEENT:  Normal cephalic, atraumatic without obvious deformity. Pupils equal, round, and reactive to light. Extra ocular muscles intact. Conjunctivae/corneas clear. Neck: Supple, with full range of motion. No jugular venous distention. Trachea midline. Respiratory:  Normal respiratory effort.  Clear to auscultation, bilaterally without Rales/Wheezes/Rhonchi. Cardiovascular:  Regular rate and rhythm with normal S1/S2 without murmurs, rubs or gallops. Abdomen: Soft, non-tender, non-distended with normal bowel sounds. Musculoskeletal:  No clubbing, cyanosis or edema bilaterally. Full range of motion without deformity. Skin: Skin color, texture, turgor normal.  No rashes or lesions. Neurologic: No cranial nerve deficits. .. Decreased tactile sensation in the lower extremities bilaterally, decreased motor strength unable to raise leg against gravity, diminished reflexes  Psychiatric:  Alert and oriented, thought content appropriate, normal insight  Capillary Refill: Brisk,< 3 seconds   Peripheral Pulses: +2 palpable, equal bilaterally       CXR:  I have reviewed the CXR with the following interpretation:   EKG:  I have reviewed the EKG with the following interpretation:     Labs:     Recent Labs     12/30/20  1005   WBC 12.1*   HGB 9.8*   HCT 30.7*        Recent Labs     12/30/20  0847 12/30/20  1005   *  --    K 6.8* 6.5*   CL 92*  --    CO2 27  --    BUN 70*  --    CREATININE 5.9*  --    CALCIUM 9.4  --      No results for input(s): AST, ALT, BILIDIR, BILITOT, ALKPHOS in the last 72 hours. No results for input(s): INR in the last 72 hours. No results for input(s): Teryl Drown in the last 72 hours. Urinalysis:      Lab Results   Component Value Date    NITRU Negative 06/05/2020    WBCUA 10-20 06/05/2020    BACTERIA 2+ 06/05/2020    RBCUA 0-2 06/05/2020    BLOODU TRACE-INTACT 06/05/2020    SPECGRAV 1.020 06/05/2020    GLUCOSEU 250 06/05/2020         ASSESSMENT:    -Severe polyneuropathy--attributed to diabetic neuropathy . Recent thoracic, cervical and lumbar MRIs showed only moderate right L5 narrowing with L5 S1 disc protrusion. Recent work-up ruled out demyelinating disease unfortunately patient weakness has slightly progressed over the past few days and is unable to ambulate. .    -Recurrent falls with weakness/ambulatory dysfunction due to above-currently unable to ambulate      -Junctional bradycardia--heart rate in the 40s      -ESRD on hemodialysis MWF    -Severe hyperkalemia-potassium 6.8    -Hyponatremia-sodium 130    -Obstructive sleep apnea-on CPAP at bedtime    -Diabetes mellitus type 2, uncontrolled-insulin-dependent    -Peripheral vascular disease status post bilateral stents--diminished pulses currently    -Nonischemic cardiomyopathy/chronic systolic heart failure, EF 43%    -History of TAVR for aortic stenosis    -Asthma/COPD -stable on inhaled steroids and bronchodilators    PLAN:    -Patient is scheduled for hemodialysis today-nephrology consulted   -will give insulin, dextrose and calcium gluconate for hyperkalemia  -Hold Coreg and consult cardiology for bradycardia, observe on telemetry  -Continue home CPAP for ZAINAB  -Consult neurology for worsening polyneuropathy  -Arterial Dopplers of the lower extremity-vascular surgery consulted by ED for diminished pulses in extremities with history of PAD  -Consult PT/OT and case management-patient needs SNF    DVT Prophylaxis: Lovenox  Diet: DIET RENAL; Carb Control: 4 carb choices (60 gms)/meal  Code Status: Full Code           Gabbie Miranda MD    Thank you Ester Rosenthal MD for the opportunity to be involved in this patient's care. If you have any questions or concerns please feel free to contact me at [phone].

## 2020-12-30 NOTE — CONSULTS
[address] [facility]   Cardiovascular Evaluation    PATIENT: Sukumar De Leon  DATE: 2020  MRN: [id number]  CSN: [id number]  : 1970    Primary Care Doctor/Referring provider: Danielle Peoples MD, Greg Hawkins MD     Reason for evaluation/Chief complaint:   Fall (pt states this morning ( 9 byron) started having numbness in legs and hands and progressively has gotten worse. pt got up and fell on his butt about an hour ago. )    Subjective:    History of present illness on initial date of evaluation:   Sukumar De Leon is a 44 y.o. patient who presents for the evaluation of difficulty walking. Patient has an extensive history of peripheral vascular disease and cardiovascular illness. He states that he attempted to get out of bed this morning and unable to walk. He states that for the past several days he felt like his legs and feet had no feeling. Patient states that he was admitted with similar symptoms back here a few weeks ago. He states that at that time he was progressively improved and started regain feeling his labs and discharged home. He states that his legs now currently have very limited feeling. Although he can move his legs without too much difficulty, he sense that he does not have any real feeling in them. States that when he stands he feels like he is going to lose balance. He did attempt to then move around this morning and fell. There was no loss of consciousness. There was no chest pain. Patient was subsequently seen and evaluated in the emergency room. Due to his medical history, he was admitted to the hospital.  Patient does all have chronic Kidney disease and is on hemodialysis.       Patient Active Problem List   Diagnosis    Acute respiratory failure with hypoxia and hypercapnia (HCC)    Chronic dCHF (grade 2 LVDD)    Chronic obstructive pulmonary disease ([address]    CAD (coronary artery disease)    PVD (peripheral vascular disease) ([address]    Nonischemic cardiomyopathy (HCC)    Sleep apnea    Bicuspid aortic valve    Bilateral hilar adenopathy syndrome    Claudication in peripheral vascular disease ([address]    Essential hypertension    Diabetic neuropathy (HCC)    Type 2 diabetes, uncontrolled, with neuropathy (HCC)    Passive smoke exposure    Depression with anxiety    Hematoma    Pneumonia of right upper lobe due to infectious organism    DM (diabetes mellitus), secondary, uncontrolled, w/neurologic complic ([address]    Hypertensive heart disease with congestive heart failure with preserved left ventricular function ([address]    CHF exacerbation ([address]    Chest pain    Coronary artery disease involving native coronary artery of native heart without angina pectoris    Obesity (BMI 30-39. 9)    ZAINAB on CPAP    Degeneration of lumbar or lumbosacral intervertebral disc    Lumbar radiculopathy    Lumbosacral spondylosis without myelopathy    Biliary colic    Symptomatic cholelithiasis    Gastroparesis due to DM (HCC)    Angina, class IV (HCC)    Dyspnea    Elevated brain natriuretic peptide (BNP) level    Dyslipidemia    Respiratory distress    Hypoxia    Chest pressure    Hypertensive urgency    Acute on chronic combined systolic and diastolic heart failure (HCC)    Ischemic cardiomyopathy    Chronic renal failure, stage 5 ([facility])    Tobacco abuse    CKD stage 4 due to type 2 diabetes mellitus (HCC)    CVA (cerebral vascular accident) ([address]    Arterial ischemic stroke, ICA, right, acute ([address]    Type 2 diabetes mellitus without complication, without long-term current use of insulin ([address]    HTN (hypertension), benign    ZAINAB (obstructive sleep apnea)    Diarrhea    Pleural effusion    Chronic anemia    Nonrheumatic aortic valve stenosis    Hypervolemia    Hyperkalemia    Mucus plugging of bronchi    Hemodialysis-associated hypotension    Left arm pain    Dizziness    ESRD (end stage renal disease) on dialysis ([address]    Hypotension due to drugs    Acute diastolic CHF (congestive heart failure) ([facility])    Neuromuscular disorder ([facility])    Acute combined systolic and diastolic CHF, NYHA class 4 ([facility])    Renovascular hypertension    Mixed hyperlipidemia    Cigarette nicotine dependence in remission    Acute respiratory failure ([facility])    Pulmonary edema    Fluid overload    Diastolic dysfunction    Anemia of chronic disease    SOB (shortness of breath)    Steal syndrome of dialysis vascular access ([facility])    Chronic, continuous use of opioids    Chronic bronchitis ([facility])    Nasal congestion    Hypercholesteremia    Bradycardia    S/P TAVR (transcatheter aortic valve replacement)    Syncope and collapse    Atrial fibrillation ([address]    Former smoker    Generalized weakness    DKA, type 1, not at goal [facility])    Bilateral leg weakness    GBS (Guillain-[place] syndrome) ([facility])    Sinus pause    Weakness of both lower extremities         Cardiac Testing: I have reviewed the findings below.   EKG:  ECHO:   STRESS TEST:  CATH:  BYPASS:  VASCULAR:    Past Medical History:   has a past medical history of Ambulatory dysfunction, Aortic stenosis, Arthritis, Asthma, Bilateral hilar adenopathy syndrome, CAD (coronary artery disease), Cardiomyopathy ([address] CHF (congestive heart failure) ([address] Chronic pain, COPD (chronic obstructive pulmonary disease) ([address] Depression, Diabetes mellitus ([address] Difficult intravenous access, Emphysema of lung ([address] ESRD (end stage renal disease) on dialysis ([address] Fear of needles, Gastric ulcer, GERD (gastroesophageal reflux disease), Heart valve problem, Hemodialysis patient ([address] History of spinal fracture, Hx of blood clots, Hyperlipidemia, Hypertension, MI (myocardial infarction) ([address] Neuromuscular disorder ([address] Numbness and tingling in left arm, Pneumonia, PONV (postoperative nausea and vomiting), Prolonged emergence from general anesthesia, Sleep apnea, Stroke ([address] TIA (transient ischemic attack), and Unspecified diseases of blood and blood-forming organs. Surgical History:   has a past surgical history that includes Tonsillectomy; cyst removal (08/14/2013); Colonoscopy; Coronary angioplasty with stent (05/26/15); vascular surgery (aprx 2 years ago); Colonoscopy (2/29/2015); Upper gastrointestinal endoscopy (01/06/2016); Upper gastrointestinal endoscopy (01/29/2017); other surgical history (02/01/2017); Dialysis fistula creation (Left, 10/30/2017); Diagnostic Cardiac Cath Lab Procedure; other surgical history (2018); other surgical history (Bilateral, 06/26/2018); pr lap insertion tunneled intraperitoneal catheter (N/A, 9/21/2018); other surgical history (Right, 09/2018); Dialysis fistula creation (Left, 3/27/2019); other surgical history (05/28/2019); Endoscopy, colon, diagnostic; Catheter Removal (Right, 7/2/2019); and Aortic valve replacement (N/A, 10/15/2019). Social History:   reports that he has been smoking cigarettes. He has a 33.00 pack-year smoking history. He has never used smokeless tobacco. He reports previous alcohol use. He reports that he does not use drugs. Family History:  No evidence for sudden cardiac death or premature CAD    Medications:  Reviewed and are listed in nursing record. and/or listed below  Outpatient Medications:  Prior to Admission medications    Medication Sig Start Date End Date Taking? Authorizing Provider   HYDROcodone-acetaminophen (NORCO) 7.5-325 MG per tablet Take 1 tablet by mouth every 6 hours as needed for Pain for up to 3 days. Intended supply: 30 days 12/30/20 1/2/21 Yes Siegel Quiet, DO   oxyCODONE-acetaminophen (PERCOCET) 7.5-325 MG per tablet Take 1 tablet by mouth every 6 hours as needed for Pain.    Yes Historical Provider, MD   Insulin Pen Needle 31G X 5 MM MISC 1 each by Does not apply route 4 times daily 12/28/20  Yes Shahida Thomas, APRN - CNP   hydrOXYzine (VISTARIL) 50 MG capsule TAKE 1 TO 2 CAPSULES BY MOUTH NIGHTLY 12/21/20 Yes Beckie Israel MD   carvedilol (COREG) 6.25 MG tablet Take 1 tablet by mouth 2 times daily (with meals) 12/18/20  Yes JAY Merlos CNP   lidocaine 4 % external patch Place 1 patch onto the skin daily 12/19/20  Yes JAY Merlos CNP   hydrALAZINE (APRESOLINE) 50 MG tablet TAKE 1/2 TABLET BY MOUTH EVERY 8 HOURS 11/24/20  Yes Curt Acevedo MD   B Complex-C-Folic Acid (VIRT-CAPS) 1 MG CAPS TK ONE C PO  QD 11/18/20  Yes Curt Acevedo MD   Continuous Blood Gluc Sensor (FREESTYLE STEPHANY 14 DAY SENSOR) MISC 1 each by Does not apply route every 14 days 11/10/20  Yes Curt Acevedo MD   insulin glargine [facility]) 100 UNIT/ML injection pen Inject 70 Units into the skin nightly 11/10/20  Yes Curt Acevedo MD   traZODone (DESYREL) 150 MG tablet TAKE (1) TABLET BY MOUTH NIGHTLY 11/4/20  Yes Curt Acevedo MD   citalopram (CELEXA) 40 MG tablet TAKE (1) TABLET BY MOUTH DAILY 11/4/20  Yes Curt Acevedo MD   insulin aspart (NOVOLOG FLEXPEN) 100 UNIT/ML injection pen Inject 20 Units into the skin 3 times daily (before meals) 10/12/20  Yes Curt Acevedo MD   clopidogrel (PLAVIX) 75 MG tablet TAKE 1 TABLET BY MOUTH ONCE DAILY 10/2/20  Yes Radha Wang MD   pravastatin (PRAVACHOL) 80 MG tablet TAKE (1) TABLET BY MOUTH ONCE DAILY 9/29/20  Yes Radha Wang MD   QUEtiapine (SEROQUEL) 50 MG tablet TAKE 1 TABLET BY MOUTH EVERY EVENING 9/28/20  Yes Curt Acevedo MD   isosorbide mononitrate (IMDUR) 30 MG extended release tablet TAKE 1 TABLET BY MOUTH EVERY DAY 9/1/20  Yes Curt Acevedo MD   torsemide (DEMADEX) 100 MG tablet Take 1-2 tablets by mouth daily 8/5/20  Yes Curt Acevedo MD   ondansetron (ZOFRAN ODT) 4 MG disintegrating tablet Take 1 tablet by mouth every 8 hours as needed for Nausea 8/5/20  Yes Curt Acevedo MD   LINZESS 145 MCG capsule TAKE 1 CAPSULE BY MOUTH EVERY MORNING BEFORE BREAKFAST 7/6/20  Yes Curt Acevedo MD   Calcium Acetate, Phos Binder, 667 MG CAPS TAKE 1 CAPSULE BY MOUTH THREE TIMES DAILY WITH MEALS 6/29/20  Yes Dipti Rios MD   tiZANidine (ZANAFLEX) 4 MG tablet TAKE 1 TABLET BY MOUTH THREE TIMES DAILY 6/4/20  Yes Dipti Rios MD   DULoxetine (CYMBALTA) 30 MG extended release capsule TAKE 1 CAPSULE BY MOUTH EVERY DAY 4/3/20  Yes JAY Rey - CNP   nystatin-triamcinolone (MYCOLOG II) 482869-7.1 UNIT/GM-% cream Apply topically 2 times daily. 3/16/20  Yes JAY Richardson CNP   losartan (COZAAR) 50 MG tablet TAKE (1) TABLET BY MOUTH DAILY 3/10/20  Yes Dipti Rios MD   pantoprazole (PROTONIX) 40 MG tablet TAKE (1) TABLET BY MOUTH EACH MORNING BEFORE BREAKFAST 3/10/20  Yes Dipti Rios MD   albuterol (PROVENTIL) (2.5 MG/3ML) 0.083% nebulizer solution INHALE 1 VIAL VIA NEBULIZER EVERY 6 HOURS AS NEEDED FOR WHEEZING 3/10/20  Yes Dipti Rios MD   nystatin (MYCOSTATIN) 840266 UNIT/GM cream Apply topically 2 times daily. 3/4/20  Yes Dipti Rios MD   Insulin Syringe-Needle U-100 30G X 1/2\" 0.5 ML MISC 1 each by Does not apply route daily 3/4/20  Yes Dipti Rios MD   nitroGLYCERIN (NITROSTAT) 0.4 MG SL tablet DISSOLVE 1 TABLET UNDER THE TONGUE AS NEEDED FOR CHEST PAIN EVERY 5 MINUTES UP TO 3 TIMES. IF NO RELIEF CALL 911. 2/7/20  Yes Dipti Rios MD   blood glucose test strips (FREESTYLE LITE) strip Daily As needed.  8/19/19  Yes Dipti Rios MD   glucose monitoring kit (FREESTYLE) monitoring kit 1 kit by Does not apply route daily 8/19/19  Yes Dipti Rios MD   vitamin D (ERGOCALCIFEROL) 45463 units CAPS capsule TK 1 C PO WEEKLY 6/2/19  Yes Gerson Velazquez MD   flunisolide (NASALIDE) 25 MCG/ACT (0.025%) SOLN Inhale 2 sprays into the lungs every 12 hours 5/22/19  Yes Mahnaz Erickson MD   Tiotropium Bromide-Olodaterol (STIOLTO RESPIMAT) 2.5-2.5 MCG/ACT AERS Inhale 2 puffs into the lungs daily 5/21/19  Yes Mahnaz Erickson MD   Polyethylene Glycol 3350 GRAN  5/2/18  Yes Historical Provider, MD   Glucose Blood (BLOOD GLUCOSE TEST STRIPS) STRP TEST 3-4 TIMES DAILY, AS DIRECTED 4/25/18  Yes Chani Dang MD   Blood Glucose Monitoring Suppl ADAM USE AS DIRECTED. 4/25/18  Yes Chani Dang MD   Alcohol Swabs PADS USE AS DIRECTED 4/25/18  Yes Chani Dang MD   albuterol sulfate  (90 Base) MCG/ACT inhaler Inhale 2 puffs into the lungs every 6 hours as needed for Wheezing 11/8/17  Yes Logan Irving MD   ipratropium-albuterol (DUONEB) 0.5-2.5 (3) MG/3ML SOLN nebulizer solution Inhale 3 mLs into the lungs every 6 hours as needed for Shortness of Breath 10/15/17  Yes Latesha Ch MD   Lancets MISC Test daily 5/25/17  Yes Pedro Graham MD   calcium carbonate (TUMS) 500 MG chewable tablet Take 1 tablet by mouth 3 times daily as needed for Heartburn. Yes Historical Provider, MD   aspirin 81 MG chewable tablet Take 1 tablet by mouth daily. Patient taking differently: Take 81 mg by mouth daily Indications: stopped on 6/25 for surgery  5/14/13  Yes Mateo Santillan MD   gabapentin (NEURONTIN) 100 MG capsule Take 1 capsule by mouth 3 times daily for 5 days. 12/18/20 12/23/20  JAY Pastor CNP   pregabalin (LYRICA) 25 MG capsule Take 1 capsule by mouth 3 times daily for 5 days. 12/18/20 12/23/20  JAY Pastor CNP       In-patient schedule medications:   pregabalin  25 mg Oral Once    insulin regular  10 Units Intravenous Once    dextrose  25 g Intravenous Once    calcium gluconate IVPB  1 g Intravenous Once         Infusion Medications: Allergies:  Morphine     Review of Systems:   14 point review of systems unable to be completed due to patient condition/cooperation. All available positives mentioned in history of present illness.          Physical Examination:    [unfilled]  BP (!) 118/59   Pulse (!) 45   Temp 97.8 °F (36.6 °C) (Oral)   Resp 18   Ht 5' 9\" (1.753 m)   Wt 240 lb (108.9 kg)   SpO2 97%   BMI 35.44 kg/m²    Weight: 240 lb (108.9 kg)     Wt Readings from Last 3 Encounters:   12/30/20 240 lb (108.9 kg)   12/21/20 261 lb 7.5 oz (118.6 kg)   11/24/20 254 lb (115.2 kg)     No intake or output data in the 24 hours ending 12/30/20 1124    General Appearance:  Alert, cooperative, no distress, appears stated age   Head:  Normocephalic, without obvious abnormality, atraumatic   Eyes:  PERRL, conjunctiva/corneas clear       Nose: Nares normal, no drainage or sinus tenderness   Throat: Lips, mucosa, and tongue normal   Neck: Supple, symmetrical, trachea midline, no adenopathy, thyroid: not enlarged, symmetric, no tenderness/mass/nodules, no carotid bruit or JVD       Lungs:   Clear to auscultation bilaterally, respirations unlabored   Chest Wall:  No tenderness or deformity   Heart:  Regular rhythm and slow rate; S1, S2 are normal; no murmur noted; no rub or gallop   Abdomen:   Soft, non-tender, bowel sounds active all four quadrants,  no masses, no organomegaly           Extremities: Extremities normal, atraumatic, no cyanosis. + edema   Pulses: diminished   Skin: Skin color, texture, turgor normal, no rashes or lesions   Pysch: Normal mood and affect   Neurologic: Normal gross motor exam. Limited feeling in the feet and ankles. Labs  Recent Labs     12/30/20  1005   WBC 12.1*   HGB 9.8*   HCT 30.7*   MCV 92.0        Recent Labs     12/30/20  0847 12/30/20  1005   CREATININE 5.9*  --    BUN 70*  --    *  --    K 6.8* 6.5*   CL 92*  --    CO2 27  --      No results for input(s): INR, PROTIME in the last 72 hours. No results for input(s): TROPONINI in the last 72 hours. Invalid input(s): PRO-BNP  No results for input(s): CHOL, HDL in the last 72 hours. Invalid input(s): LDL, TG      Imaging:  I have reviewed the below testing personally and my interpretation is below. EKG:Junctional bradycardiaLow voltage QRSAbnormal ECGWhen compared with ECG of 10-DEC-2020 20:47,Junctional rhythm has replaced Sinus rhythm    CXR:      Assessment:  44 y.o. patient with:  1. Fall  2. Bradycardia  3.

## 2020-12-30 NOTE — CARE COORDINATION
Pt in HD at this time. Will attempt assessment later today as time allows. Of note: per previous CM notes 12/21/20, pt lives w/spouse, has aides thru [employer] 2 days/week for 4 hours and has hemodialysis at [facility] MWF at [phone]. CM team following.

## 2020-12-30 NOTE — PROGRESS NOTES
4 Eyes Skin Assessment     NAME:  Ronaldo Tipton OF BIRTH:  1970  MEDICAL RECORD NUMBER:  [id number]    The patient is being assess for  Admission    I agree that 2 RN's have performed a thorough Head to Toe Skin Assessment on the patient. ALL assessment sites listed below have been assessed. Areas assessed by both nurses:    Head, Face, Ears, Shoulders, Back, Chest, Arms, Elbows, Hands, Sacrum. Buttock, Coccyx, Ischium and Legs. Feet and Heels        Does the Patient have a Wound? Yes wound(s) were present on assessment.  LDA wound assessment was Initiated and completed        Isaac Prevention initiated:  Yes   Wound Care Orders initiated:  No    Pressure Injury (Stage 3,4, Unstageable, DTI, NWPT, and Complex wounds) if present place consult order under [de-identified] chronic BLE ankle ulcers, scabbed over    New and Established Ostomies if present place consult order under : NA      Nurse 1 eSignature: Electronically signed by Alejandra Gerber RN on 12/30/20 at 12:16 PM EST    **SHARE this note so that the co-signing nurse is able to place an eSignature**    Nurse 2 eSignature: Electronically signed by Anna Garcia RN on 12/30/20 at 2:32 PM EST

## 2020-12-30 NOTE — ED NOTES
Lab called with critical lab for crt and potassium with blood hemolyzed. Redraw for potassium and cbc unable to be obtained. Lab called for redraw.      Mardene Lundborg, RN  12/30/20 Ul. Brittani Sales RN  12/30/20 6217

## 2020-12-30 NOTE — ED NOTES
2380 - Dr Emerson Silva speaking with hospitalist at this time     Emerson Covarrubias  12/30/20 3594

## 2020-12-30 NOTE — ED NOTES

## 2020-12-31 LAB
ANION GAP SERPL CALCULATED.3IONS-SCNC: 12 MMOL/L (ref 3–16)
BASOPHILS ABSOLUTE: 0.1 K/UL (ref 0–0.2)
BASOPHILS RELATIVE PERCENT: 0.8 %
BUN BLDV-MCNC: 45 MG/DL (ref 7–20)
CALCIUM SERPL-MCNC: 9 MG/DL (ref 8.3–10.6)
CHLORIDE BLD-SCNC: 92 MMOL/L (ref 99–110)
CO2: 27 MMOL/L (ref 21–32)
CREAT SERPL-MCNC: 5.5 MG/DL (ref 0.9–1.3)
EOSINOPHILS ABSOLUTE: 0.4 K/UL (ref 0–0.6)
EOSINOPHILS RELATIVE PERCENT: 4.6 %
ESTIMATED AVERAGE GLUCOSE: 200.1 MG/DL
GFR AFRICAN AMERICAN: 13
GFR NON-AFRICAN AMERICAN: 11
GLUCOSE BLD-MCNC: 121 MG/DL (ref 70–99)
GLUCOSE BLD-MCNC: 132 MG/DL (ref 70–99)
GLUCOSE BLD-MCNC: 133 MG/DL (ref 70–99)
GLUCOSE BLD-MCNC: 212 MG/DL (ref 70–99)
GLUCOSE BLD-MCNC: 227 MG/DL (ref 70–99)
GLUCOSE BLD-MCNC: 92 MG/DL (ref 70–99)
HBA1C MFR BLD: 8.6 %
HCT VFR BLD CALC: 31.1 % (ref 40.5–52.5)
HEMOGLOBIN: 10.3 G/DL (ref 13.5–17.5)
LYMPHOCYTES ABSOLUTE: 1.1 K/UL (ref 1–5.1)
LYMPHOCYTES RELATIVE PERCENT: 12.3 %
MCH RBC QN AUTO: 29.7 PG (ref 26–34)
MCHC RBC AUTO-ENTMCNC: 33 G/DL (ref 31–36)
MCV RBC AUTO: 90 FL (ref 80–100)
MONOCYTES ABSOLUTE: 0.6 K/UL (ref 0–1.3)
MONOCYTES RELATIVE PERCENT: 6.5 %
NEUTROPHILS ABSOLUTE: 6.6 K/UL (ref 1.7–7.7)
NEUTROPHILS RELATIVE PERCENT: 75.8 %
PDW BLD-RTO: 14.2 % (ref 12.4–15.4)
PERFORMED ON: ABNORMAL
PERFORMED ON: NORMAL
PLATELET # BLD: 245 K/UL (ref 135–450)
PMV BLD AUTO: 7.8 FL (ref 5–10.5)
POTASSIUM REFLEX MAGNESIUM: 4.7 MMOL/L (ref 3.5–5.1)
RBC # BLD: 3.45 M/UL (ref 4.2–5.9)
SODIUM BLD-SCNC: 131 MMOL/L (ref 136–145)
WBC # BLD: 8.8 K/UL (ref 4–11)

## 2020-12-31 PROCEDURE — 97535 SELF CARE MNGMENT TRAINING: CPT

## 2020-12-31 PROCEDURE — G0378 HOSPITAL OBSERVATION PER HR: HCPCS

## 2020-12-31 PROCEDURE — 6360000002 HC RX W HCPCS: Performed by: HOSPITALIST

## 2020-12-31 PROCEDURE — 99221 1ST HOSP IP/OBS SF/LOW 40: CPT | Performed by: PSYCHIATRY & NEUROLOGY

## 2020-12-31 PROCEDURE — 2580000003 HC RX 258: Performed by: HOSPITALIST

## 2020-12-31 PROCEDURE — 6370000000 HC RX 637 (ALT 250 FOR IP): Performed by: HOSPITALIST

## 2020-12-31 PROCEDURE — 6370000000 HC RX 637 (ALT 250 FOR IP): Performed by: NURSE PRACTITIONER

## 2020-12-31 PROCEDURE — 80048 BASIC METABOLIC PNL TOTAL CA: CPT

## 2020-12-31 PROCEDURE — 83036 HEMOGLOBIN GLYCOSYLATED A1C: CPT

## 2020-12-31 PROCEDURE — 94640 AIRWAY INHALATION TREATMENT: CPT

## 2020-12-31 PROCEDURE — 85025 COMPLETE CBC W/AUTO DIFF WBC: CPT

## 2020-12-31 PROCEDURE — 2500000003 HC RX 250 WO HCPCS: Performed by: HOSPITALIST

## 2020-12-31 PROCEDURE — 1200000000 HC SEMI PRIVATE

## 2020-12-31 PROCEDURE — 97110 THERAPEUTIC EXERCISES: CPT

## 2020-12-31 PROCEDURE — 94660 CPAP INITIATION&MGMT: CPT

## 2020-12-31 PROCEDURE — 97530 THERAPEUTIC ACTIVITIES: CPT

## 2020-12-31 PROCEDURE — 99214 OFFICE O/P EST MOD 30 MIN: CPT | Performed by: SURGERY

## 2020-12-31 PROCEDURE — 2700000000 HC OXYGEN THERAPY PER DAY

## 2020-12-31 PROCEDURE — 99233 SBSQ HOSP IP/OBS HIGH 50: CPT | Performed by: INTERNAL MEDICINE

## 2020-12-31 PROCEDURE — 36415 COLL VENOUS BLD VENIPUNCTURE: CPT

## 2020-12-31 PROCEDURE — 94761 N-INVAS EAR/PLS OXIMETRY MLT: CPT

## 2020-12-31 RX ADMIN — OXYCODONE HYDROCHLORIDE AND ACETAMINOPHEN 1 TABLET: 7.5; 325 TABLET ORAL at 22:19

## 2020-12-31 RX ADMIN — ISOSORBIDE MONONITRATE 30 MG: 30 TABLET, EXTENDED RELEASE ORAL at 11:00

## 2020-12-31 RX ADMIN — HYDRALAZINE HYDROCHLORIDE 25 MG: 25 TABLET, FILM COATED ORAL at 13:05

## 2020-12-31 RX ADMIN — INSULIN LISPRO 20 UNITS: 100 INJECTION, SOLUTION INTRAVENOUS; SUBCUTANEOUS at 13:03

## 2020-12-31 RX ADMIN — OXYCODONE HYDROCHLORIDE AND ACETAMINOPHEN 1 TABLET: 7.5; 325 TABLET ORAL at 15:47

## 2020-12-31 RX ADMIN — CALCIUM ACETATE 667 MG: 667 CAPSULE ORAL at 18:11

## 2020-12-31 RX ADMIN — TIZANIDINE 4 MG: 4 TABLET ORAL at 13:06

## 2020-12-31 RX ADMIN — TORSEMIDE 100 MG: 100 TABLET ORAL at 11:02

## 2020-12-31 RX ADMIN — INSULIN LISPRO 6 UNITS: 100 INJECTION, SOLUTION INTRAVENOUS; SUBCUTANEOUS at 13:01

## 2020-12-31 RX ADMIN — TRAZODONE HYDROCHLORIDE 150 MG: 50 TABLET ORAL at 22:18

## 2020-12-31 RX ADMIN — HEPARIN SODIUM 5000 UNITS: 5000 INJECTION INTRAVENOUS; SUBCUTANEOUS at 13:03

## 2020-12-31 RX ADMIN — CALCIUM ACETATE 667 MG: 667 CAPSULE ORAL at 10:53

## 2020-12-31 RX ADMIN — PRAVASTATIN SODIUM 80 MG: 80 TABLET ORAL at 22:19

## 2020-12-31 RX ADMIN — OXYCODONE HYDROCHLORIDE AND ACETAMINOPHEN 1 TABLET: 7.5; 325 TABLET ORAL at 09:47

## 2020-12-31 RX ADMIN — PREGABALIN 25 MG: 25 CAPSULE ORAL at 13:06

## 2020-12-31 RX ADMIN — PANTOPRAZOLE SODIUM 40 MG: 40 TABLET, DELAYED RELEASE ORAL at 06:04

## 2020-12-31 RX ADMIN — INSULIN LISPRO 2 UNITS: 100 INJECTION, SOLUTION INTRAVENOUS; SUBCUTANEOUS at 22:19

## 2020-12-31 RX ADMIN — HYDRALAZINE HYDROCHLORIDE 25 MG: 25 TABLET, FILM COATED ORAL at 22:19

## 2020-12-31 RX ADMIN — PREGABALIN 25 MG: 25 CAPSULE ORAL at 10:55

## 2020-12-31 RX ADMIN — LOSARTAN POTASSIUM 50 MG: 25 TABLET, FILM COATED ORAL at 11:01

## 2020-12-31 RX ADMIN — DULOXETINE HYDROCHLORIDE 30 MG: 30 CAPSULE, DELAYED RELEASE ORAL at 11:00

## 2020-12-31 RX ADMIN — NEPHROCAP 1 MG: 1 CAP ORAL at 10:58

## 2020-12-31 RX ADMIN — Medication 10 ML: at 22:32

## 2020-12-31 RX ADMIN — CLOPIDOGREL BISULFATE 75 MG: 75 TABLET, FILM COATED ORAL at 10:59

## 2020-12-31 RX ADMIN — TIZANIDINE 4 MG: 4 TABLET ORAL at 10:54

## 2020-12-31 RX ADMIN — QUETIAPINE FUMARATE 50 MG: 25 TABLET ORAL at 22:19

## 2020-12-31 RX ADMIN — ASPIRIN 81 MG 81 MG: 81 TABLET ORAL at 10:57

## 2020-12-31 RX ADMIN — Medication 10 ML: at 10:57

## 2020-12-31 RX ADMIN — INSULIN GLARGINE 35 UNITS: 100 INJECTION, SOLUTION SUBCUTANEOUS at 01:15

## 2020-12-31 RX ADMIN — INSULIN GLARGINE 70 UNITS: 100 INJECTION, SOLUTION SUBCUTANEOUS at 22:20

## 2020-12-31 RX ADMIN — HEPARIN SODIUM 5000 UNITS: 5000 INJECTION INTRAVENOUS; SUBCUTANEOUS at 06:04

## 2020-12-31 RX ADMIN — GLYCOPYRROLATE AND FORMOTEROL FUMARATE 2 PUFF: 9; 4.8 AEROSOL, METERED RESPIRATORY (INHALATION) at 07:23

## 2020-12-31 RX ADMIN — CITALOPRAM HYDROBROMIDE 40 MG: 20 TABLET ORAL at 10:59

## 2020-12-31 RX ADMIN — TIZANIDINE 4 MG: 4 TABLET ORAL at 22:19

## 2020-12-31 RX ADMIN — PREGABALIN 25 MG: 25 CAPSULE ORAL at 22:19

## 2020-12-31 RX ADMIN — INSULIN LISPRO 20 UNITS: 100 INJECTION, SOLUTION INTRAVENOUS; SUBCUTANEOUS at 10:49

## 2020-12-31 RX ADMIN — CALCIUM ACETATE 667 MG: 667 CAPSULE ORAL at 13:06

## 2020-12-31 ASSESSMENT — PAIN DESCRIPTION - LOCATION: LOCATION: BACK;LEG

## 2020-12-31 ASSESSMENT — PAIN DESCRIPTION - ONSET: ONSET: ON-GOING

## 2020-12-31 ASSESSMENT — PAIN SCALES - GENERAL
PAINLEVEL_OUTOF10: 6
PAINLEVEL_OUTOF10: 8
PAINLEVEL_OUTOF10: 8

## 2020-12-31 ASSESSMENT — PAIN DESCRIPTION - PROGRESSION: CLINICAL_PROGRESSION: NOT CHANGED

## 2020-12-31 ASSESSMENT — PAIN DESCRIPTION - ORIENTATION: ORIENTATION: LOWER

## 2020-12-31 ASSESSMENT — PAIN DESCRIPTION - FREQUENCY: FREQUENCY: CONTINUOUS

## 2020-12-31 NOTE — PROGRESS NOTES
Occupational Therapy  Facility/Department: [facility] B3 - MED SURG  Daily Treatment Note  NAME: Candace Garcia  : 1970  MRN: [id number]    Date of Service: 2020    Discharge Recommendations:  Subacute/Skilled Nursing Facility       Assessment   Performance deficits / Impairments: Decreased functional mobility ; Decreased ADL status; Decreased safe awareness;Decreased balance;Decreased endurance  Assessment: Pt continues to need x2A for all mobility (limited due to B weakness and numbness). pt continues to need prolonged rest breaks and time due to fatigue. Pt only able to ambulate ~5 ft to recliner and then ~5 ft forward with recliner follow. Cont OT POC. Treatment Diagnosis: LE weakness and impaired balance  Prognosis: Fair  OT Education: OT Role;Plan of Care  Patient Education: disease specific ed:  role of OT, safe mobility with walker  REQUIRES OT FOLLOW UP: Yes  Activity Tolerance  Activity Tolerance: Patient limited by pain; Patient limited by fatigue  Safety Devices  Safety Devices in place: Yes  Type of devices: Chair alarm in place;Call light within reach;Nurse notified; Left in chair;Patient at risk for falls; All fall risk precautions in place;Gait belt  Restraints  Initially in place: No         Patient Diagnosis(es): The primary encounter diagnosis was Fall, initial encounter. A diagnosis of Diabetic polyneuropathy associated with type 2 diabetes mellitus ([address] was also pertinent to this visit.       has a past medical history of Ambulatory dysfunction, Aortic stenosis, Arthritis, Asthma, Bilateral hilar adenopathy syndrome, CAD (coronary artery disease), Cardiomyopathy ([address] CHF (congestive heart failure) ([address] Chronic pain, COPD (chronic obstructive pulmonary disease) ([address] Depression, Diabetes mellitus ([address] Difficult intravenous access, Emphysema of lung ([address] ESRD (end stage renal disease) on dialysis ([address] Fear of needles, Gastric ulcer, GERD (gastroesophageal reflux disease), On-going  Clinical Progression: Not changed  Functional Pain Assessment: Activities are not prevented  Non-Pharmaceutical Pain Intervention(s): Ambulation/Increased Activity  Response to Pain Intervention: Patient Satisfied  Pre Treatment Pain Screening  Intervention List: Patient able to continue with treatment  Vital Signs  Patient Currently in Pain: Yes   Orientation  Orientation  Overall Orientation Status: Within Normal Limits  Objective    ADL  Feeding: Supervision;Setup  Grooming: Stand by assistance;Verbal cueing; Increased time to complete  LE Dressing: Dependent/Total  Additional Comments: stood with RW to use urinal SBA        Balance  Sitting Balance: Stand by assistance  Standing Balance: Dependent/Total  Standing Balance  Activity: modA x2 sit <> stand and then SPT EOB to recliner, sit <> stand modA x2 with RW and then modA x2 for limited steps forwards  Functional Mobility  Functional - Mobility Device: Rolling Walker  Activity: Other  Assist Level: Dependent/Total  Functional Mobility Comments: modA x2  Bed mobility  Supine to Sit: Stand by assistance  Sit to Supine: Unable to assess  Scooting: Stand by assistance  Transfers  Stand Step Transfers: Moderate assistance;2 Person assistance  Sit to stand: Moderate assistance;2 Person assistance  Stand to sit: Moderate assistance;2 Person assistance        Coordination  Gross Motor: fair GM with sit <> stand  Fine Motor: fair FM coordination with manipulation of urinal              Cognition  Overall Cognitive Status: Exceptions  Following Commands:  Follows one step commands consistently  Problem Solving: Assistance required to correct errors made;Assistance required to identify errors made     Perception  Overall Perceptual Status: [facility]                                   Plan   Plan  Times per week: 3-5x  Current Treatment Recommendations: Self-Care / ADL, Safety Education & Training, Functional Mobility Training, Balance Training, Endurance Training, Equipment Evaluation, Education, & procurement           AM-PAC Score        AM-PAC Inpatient Daily Activity Raw Score: 15 (12/31/20 1000)  AM-PAC Inpatient ADL T-Scale Score : 34.69 (12/31/20 1000)  ADL Inpatient CMS 0-100% Score: 56.46 (12/31/20 1000)  ADL Inpatient CMS G-Code Modifier : CK (12/31/20 1000)    Goals  Short term goals  Time Frame for Short term goals: 1 week (1/6) unless noted  Short term goal 1: Perform functional transfer with mod A with AD  Short term goal 2: Perform toileting with min A  Short term goal 3: Perform UE exer 15x each to improve endurance by 1/4  Short term goal 4: Stand > 1 min with walker and mod A for ADL tasks  Patient Goals   Patient goals : \"be able to walk\"       Therapy Time   Individual Concurrent Group Co-treatment   Time In       [facility] 58   Time Out       0944   Minutes       26   Timed Code Treatment Minutes: 48 SHANE Lopez/L

## 2020-12-31 NOTE — CARE COORDINATION
[facility]    Referral received from  to follow for home care services. I will follow for needs, and speak with patient to verify demos.     Debra Mac RN, BSN CTN  [facility] [phone]

## 2020-12-31 NOTE — CONSULTS
[address] [address]                                  CONSULTATION    PATIENT NAME: Nile Roy                  :        1970  MED REC NO:   [id number]                          ROOM:       [id number]  ACCOUNT NO:   [de-identified]                           ADMIT DATE: 2020  PROVIDER:     Chapin Amado MD    CONSULT DATE:  2020    CARDIAC ELECTROPHYSIOLOGY CONSULTATION    REASON FOR CONSULTATION:  Bradycardia. HISTORY OF PRESENT ILLNESS:   The patient is a 66-year-old man with a  history of poorly controlled diabetes, end-stage renal disease - on  hemodialysis, coronary artery disease, and cardiomyopathy who presents  following a fall. The patient was evaluated about 2 weeks ago with  complaints of lower extremity weakness and paresthesias. He has  undergone an extensive evaluation for the lower extremity symptoms, but  no compelling cause has been identified. On his last admission, he had a brief episodes of sinus bradycardia on  2020 which was not associated with hyperkalemia. This transient  30-minute episode of bradycardia did not result in any clear symptoms. The patient was admitted with a fall which was thought related to the  lower extremity weakness. Ambulatory ECG monitoring from the last 2  weeks ending on 2020 demonstrates an average heart rate of 78  beats per minute with minimal transient bradycardia. At the time of admission, the patient was admitted following a fall. He  reports standing up from his wheelchair and was unable to make it more  than 1 or 2 steps before falling. He noted that his lower extremity  weakness has been worsening over the last few days and he has been very  concerned about ambulation.   In addition, he now describes some upper  extremity paresthesias of the hands and fingertips and the top of the scalp. At the time of evaluation, he had sinus bradycardia and  junctional rhythm with heart rates in the low to mid 40s. The potassium  level was elevated at 6.8 at that time. He then underwent dialysis and  has had return to sinus function with normal sinus rates. PAST MEDICAL HISTORY:  1. Poorly controlled diabetes. 2.  Status post transaortic valve replacement. 3.  Coronary artery disease, status post LAD stent. 4.  End-stage renal disease, on hemodialysis. 5.  Lower extremity paresthesias and weakness of unknown etiology. MEDICATIONS:  At the time of admission include aspirin 81 mg p.o. q.d.,  calcium acetate 667 mg t.i.d., calcium carbonate 500 mg p.o. t.i.d.,  Coreg 6.25 mg p.o. b.i.d., Plavix 75 mg p.o. q.d., Cymbalta 30 mg p.o.  q.d., Neurontin 100 mg p.o. t.i.d., hydralazine 25 mg p.o. q.8 hours,  isosorbide mononitrate 30 mg p.o. q.d., Linzess 145 mcg p.o. q.d.,  losartan 50 mg p.o. q.d., oxycodone and acetaminophen 7.5/325 1 p.o. q.6  hours as needed, Protonix 40 mg p.o. q.d., Pravachol 80 mg p.o. q.d.,  Lyrica 25 mg p.o. t.i.d., Seroquel 50 mg p.o. q.h.s., Zanaflex 4 mg p.o.  t.i.d., torsemide 100 mg p.o. q.d., trazodone 150 mg p.o. q.d. and  insulin as directed. ALLERGIES:  Include MORPHINE. SOCIAL HISTORY:  The patient is a current everyday cigarette smoker. He  does not consume alcohol at this time. FAMILY HISTORY:  Remarkable for diabetes in the mother. There is a  history of heart disease in the father. There is a history of heart  disease and kidney disease in multiple family members. REVIEW OF SYSTEMS:  Demonstrates no recent change in appetite or change  in weight. The patient has not had recent fever or chills. He  describes the above-mentioned lower extremity paresthesias and weakness. He does not describe palpitations. He has not had true syncope. He has  not had chest pain. All other components of the 14-system review are  negative.

## 2020-12-31 NOTE — FLOWSHEET NOTE
12/30/20 1210 12/30/20 1520   Vital Signs   Temp 97.5 °F (36.4 °C) 97.9 °F (36.6 °C)   Pulse (!) 45 82   BP (!) 102/49 (!) 174/76   Height and Weight   Weight 264 lb 5.3 oz (119.9 kg) 257 lb 8 oz (116.8 kg)     Stable tx. Net UF 3100ml.

## 2020-12-31 NOTE — CONSULTS
In patient Neurology consult        [facility]      Luc Isbell MD      Anamika Trujillo  1970    Date of Service: 12/31/2020    Referring Physician: Luisa López MD      Reason for the consult and CC: Recurrent falling and generalized weakness. HPI:   The patient is a 44y.o.  years old male with multiple medical problems, well-known to me from recent admission, who was admitted to the hospital yesterday with leg weakness, numbness and tingling with recurrent falling. Symptoms started few days ago. The patient has similar presentation at the beginning of this month. Please refer to my detailed consult note. Patient had extensive work-up recently including MRI of the spine and LP which was not consistent with inflammatory transverse myelitis or GBS. Patient today denies any new symptoms. Degree is moderate and duration is persistent. No headache or chest pain, dysphagia or dysarthria. Initial presentation revealed some hypotension and bradycardia. He was admitted. Other review of system was unremarkable.     Family History   Problem Relation Age of Onset    Diabetes Mother     Heart Disease Father     Kidney Disease Sister         stage 4-kidney failure    Cancer Sister     Heart Disease Sister     Obesity Sister     Cancer Sister     Heart Disease Sister     Obesity Sister     Alcohol Abuse Brother      Past Surgical History:   Procedure Laterality Date    AORTIC VALVE REPLACEMENT N/A 10/15/2019    TRANSCATHETER AORTIC VALVE REPLACEMENT FEMORAL APPROACH performed by Merlinda Saunas, MD at [address] [place] [address] Ave Right 7/2/2019    PERITONEAL DIALYSIS CATHETER REMOVAL performed by Lawyer Fransisco MD at [facility]  2/29/2015    WN    CORONARY ANGIOPLASTY WITH STENT PLACEMENT  05/26/15    CYST REMOVAL  08/14/2013    EXCISION CYSTS, NECK X2 AND ABDOMINAL benign    DIAGNOSTIC CARDIAC CATH LAB PROCEDURE      DIALYSIS at 12/30/20 1349    fluticasone (FLONASE) 50 MCG/ACT nasal spray 1 spray  1 spray Each Nostril BID Carmencita Arredondo MD        hydrALAZINE (APRESOLINE) tablet 25 mg  25 mg Oral 3 times per day Carmencita Arredondo MD        hydrOXYzine (VISTARIL) capsule 50 mg  50 mg Oral Nightly PRN Carmecnita Arredondo MD        insulin glargine (LANTUS) injection vial 70 Units  70 Units Subcutaneous Nightly Carmencita Arredondo MD        insulin lispro (HUMALOG) injection vial 20 Units  20 Units Subcutaneous TID WC Carmencita Arredondo MD   20 Units at 12/30/20 1812    isosorbide mononitrate (IMDUR) extended release tablet 30 mg  30 mg Oral Daily Carmencita Arredondo MD   Stopped at 12/30/20 1348    losartan (COZAAR) tablet 50 mg  50 mg Oral Daily Carmencita Arredondo MD   Stopped at 12/30/20 1349    pravastatin (PRAVACHOL) tablet 80 mg  80 mg Oral Nightly Carmencita Arredondo MD   80 mg at 12/30/20 2149    pantoprazole (PROTONIX) tablet 40 mg  40 mg Oral QAM AC Carmencita Arredondo MD   40 mg at 12/31/20 0604    QUEtiapine (SEROQUEL) tablet 50 mg  50 mg Oral QPM Carmencita Arredondo MD   50 mg at 12/30/20 2149    glycopyrrolate-formoterol (BEVESPI) 9-4.8 MCG/ACT inhaler 2 puff  2 puff Inhalation BID Carmencita Arredondo MD   2 puff at 12/31/20 5621    torsemide (DEMADEX) tablet 100 mg  100 mg Oral Daily Carmencita Arredondo MD   Stopped at 12/30/20 1350    traZODone (DESYREL) tablet 150 mg  150 mg Oral Nightly Carmencita Arredondo MD   150 mg at 12/30/20 2149    tiZANidine (ZANAFLEX) tablet 4 mg  4 mg Oral TID Carmencita Arredondo MD   4 mg at 12/30/20 2149    pregabalin (LYRICA) capsule 25 mg  25 mg Oral TID Carmencita Arredondo MD   25 mg at 12/30/20 2149    oxyCODONE-acetaminophen (PERCOCET) 7.5-325 MG per tablet 1 tablet  1 tablet Oral Q6H PRN Carmencita Arredondo MD   1 tablet at 12/30/20 1722    [START ON 1/1/2021] linaclotide (LINZESS) capsule 145 mcg  145 mcg Oral QAM AC Carmencita Arredondo MD        calcium carbonate (TUMS) chewable tablet 500 mg 1 tablet Oral TID PRN Ssuie Martinez MD        b complex-C-folic acid (NEPHROCAPS) capsule 1 mg  1 capsule Oral Daily Susie Martinez MD   Stopped at 12/30/20 1348    sodium chloride flush 0.9 % injection 10 mL  10 mL Intravenous 2 times per day Susie Martinez MD   10 mL at 12/30/20 2150    sodium chloride flush 0.9 % injection 10 mL  10 mL Intravenous PRN Susie Martinez MD        promethazine (PHENERGAN) tablet 12.5 mg  12.5 mg Oral Q6H PRN Susie Martinez MD        Or    ondansetron [facility]) injection 4 mg  4 mg Intravenous Q6H PRN Susie Martinez MD        polyethylene glycol [facility]) packet 17 g  17 g Oral Daily PRN Susie Martinez MD        acetaminophen (TYLENOL) tablet 650 mg  650 mg Oral Q6H PRN Susie Martinez MD        Or    acetaminophen (TYLENOL) suppository 650 mg  650 mg Rectal Q6H PRN Susie Martinez MD        glucose (GLUTOSE) 40 % oral gel 15 g  15 g Oral PRN Susie Martinez MD        dextrose 50 % IV solution  12.5 g Intravenous PRN Susie Martinez MD        glucagon (rDNA) injection 1 mg  1 mg Intramuscular PRN Susie Martinez MD        dextrose 5 % solution  100 mL/hr Intravenous PRN Susie Martinez MD        insulin lispro (HUMALOG) injection vial 0-12 Units  0-12 Units Subcutaneous TID WC Susie Martinez MD   2 Units at 12/30/20 1812    insulin lispro (HUMALOG) injection vial 0-6 Units  0-6 Units Subcutaneous Nightly Susie Martinez MD        insulin regular (HUMULIN R;NOVOLIN R) injection 10 Units  10 Units Intravenous Once Susie Martinez MD   Stopped at 12/30/20 1209    dextrose 50 % IV solution  25 g Intravenous Once Susie Martinez MD   Stopped at 12/30/20 1208    calcium gluconate 1 g in sodium chloride 50 mL  1 g Intravenous Once Susie Martinez MD        heparin (porcine) injection 4,100 Units  4,100 Units Intravenous Q MWF Nino Womack MD   4,100 Units at 12/30/20 1217    doxercalciferol (HECTOROL) injection 3.5 mcg  3.5 mcg Intravenous Q MWF Chase Ross MD   3.5 mcg at 12/30/20 1500    heparin (porcine) injection 5,000 Units  5,000 Units Subcutaneous 3 times per day Jarrell Betancourt MD   5,000 Units at 12/31/20 0604    heparin (porcine) injection 4,000 Units  4,000 Units Intracatheter PRN Chase Ross MD   4,000 Units at 12/30/20 1530       ROS : A 10-14 system review of constitutional, cardiovascular, respiratory, eyes, musculoskeletal, endocrine, GI, ENT, skin, hematological, genitourinary, psychiatric and neurologic systems was obtained and updated today and is unremarkable except as mentioned in my HPI      Exam:     Constitutional:   Vitals:    12/30/20 1542 12/30/20 2004 12/31/20 0545 12/31/20 0725   BP: (!) 177/74 (!) 117/54 129/78    Pulse: 85 84 83    Resp:  18 18    Temp: 97.6 °F (36.4 °C) 98 °F (36.7 °C) 98.4 °F (36.9 °C)    TempSrc: Axillary Oral Oral    SpO2: 98% 96% 93% 93%   Weight:       Height:           General appearance:  Normal development and appear in no acute distress. Eye: No icterus. Fundus: Funduscopic examination cannot be performed due to COVID19 restrictions and precautions. Neck: supple  Cardiovascular: No lower leg edema with good pulsation. Mental Status:   Oriented to person, place, problem, and time. Memory: Aware of recent and remote event. Good immediate recall. Intact remote memory  Normal attention span and concentration. Language: intact naming, repeating and fluency   Good fund of Knowledge. Aware of current events and vocabulary   Cranial Nerves:   II: Visual fields: Full. Pupils: equal, round, reactive to light  III,IV,VI: Extra Ocular Movements are intact. No nystagmus  V: Facial sensation is intact   VII: Facial strength and movements: intact and symmetric  VIII: Hearing: Intact  IX: Palate elevation is symmetric  XI: Shoulder shrug is intact  XII: Tongue movements are normal  Musculoskeletal: No focal weakness. Tone: Normal tone.    Reflexes: 2+ in the arms MD  [phone]    This dictation was generated by voice recognition computer software.  Although all attempts are made to edit the dictation for accuracy, there may be errors in the  transcription that are not intended

## 2020-12-31 NOTE — CARE COORDINATION
CASE MANAGEMENT INITIAL ASSESSMENT      Reviewed chart and completed assessment at bedside with patient. Explained Case Management role/services. yes    Primary contact information: spouse, [address] :   Primary Decision Maker: Crystal Ann - Spouse - [phone]        Can this person be reached and be able to respond quickly, such as within a few minutes or hours? Yes    Admit date/status: observation   Diagnosis: Weakness of lower extremities     Is this a Readmission?:  No      Insurance: [facility] required for SNF: No       3 night stay required: No    Living arrangements, Adls, care needs, prior to admission: lives with spouse in double wide trailer with ramped entrance. Level living area. Drives. IPTA    Transportation: D     [address] at home:  Walker__Cane__RTS__ BSC__Shower Chair__  02__ HHN_x_ CPAP_x_  BiPap__  Hospital Bed__ W/C___ Rollator, motorized scooter    Services in the home and/or outpatient, prior to admission: aides 3x/week for 4 hours thru [employer] for cleaning/laundry    Dialysis Facility (if applicable)   · Name: [place]  · Address:  · Dialysis Schedule: MWF @ 1110  · Phone:  · Fax:    PT/OT recs: SNF    Hospital Exemption Notification (HEN): not started    Barriers to discharge: none    Plan/comments: pt prefers to discharge home vs going to SNF. IS agreeable to home care - ref to [facility]. After further discussion, pt asked for referral to [facility] for potential admission IF he absolutely cannot return home. VM left for Kandi in admissions.      ECOC on chart for MD signature    Laura Duque RN

## 2020-12-31 NOTE — PROGRESS NOTES
Hospitalist Progress Note      PCP: Mell Santiago MD    Date of Admission: 12/30/2020      Subjective:   Patient states that the numbness persists but weakness in the lower legs is better today. . No new complaints      Medications:  Reviewed    Infusion Medications    dextrose       Scheduled Medications    pregabalin  25 mg Oral Once    aspirin  81 mg Oral Daily    Calcium Acetate (Phos Binder)  1 capsule Oral TID WC    citalopram  40 mg Oral Daily    clopidogrel  75 mg Oral Daily    DULoxetine  30 mg Oral Daily    fluticasone  1 spray Each Nostril BID    hydrALAZINE  25 mg Oral 3 times per day    insulin glargine  70 Units Subcutaneous Nightly    insulin lispro  20 Units Subcutaneous TID WC    isosorbide mononitrate  30 mg Oral Daily    losartan  50 mg Oral Daily    pravastatin  80 mg Oral Nightly    pantoprazole  40 mg Oral QAM AC    QUEtiapine  50 mg Oral QPM    glycopyrrolate-formoterol  2 puff Inhalation BID    torsemide  100 mg Oral Daily    traZODone  150 mg Oral Nightly    tiZANidine  4 mg Oral TID    pregabalin  25 mg Oral TID    [START ON 1/1/2021] linaclotide  145 mcg Oral QAM AC    b complex-C-folic acid  1 capsule Oral Daily    sodium chloride flush  10 mL Intravenous 2 times per day    insulin lispro  0-12 Units Subcutaneous TID WC    insulin lispro  0-6 Units Subcutaneous Nightly    insulin regular  10 Units Intravenous Once    dextrose  25 g Intravenous Once    calcium gluconate-NaCl  1 g Intravenous Once    heparin (porcine)  4,100 Units Intravenous Q MWF    doxercalciferol  3.5 mcg Intravenous Q MWF    heparin (porcine)  5,000 Units Subcutaneous 3 times per day     PRN Meds: albuterol, hydrOXYzine, oxyCODONE-acetaminophen, calcium carbonate, sodium chloride flush, promethazine **OR** ondansetron, polyethylene glycol, acetaminophen **OR** acetaminophen, glucose, dextrose, glucagon (rDNA), dextrose, heparin (porcine)      Intake/Output Summary (Last 24 hours) at 12/31/2020 0914  Last data filed at 12/31/2020 0834  Gross per 24 hour   Intake 640 ml   Output 3600 ml   Net -2960 ml       Exam:    /78   Pulse 83   Temp 98.4 °F (36.9 °C) (Oral)   Resp 18   Ht 5' 9\" (1.753 m)   Wt 257 lb 8 oz (116.8 kg)   SpO2 93%   BMI 38.03 kg/m²     General appearance: No apparent distress, appears stated age and cooperative. HEENT: Pupils equal, round, and reactive to light. Conjunctivae/corneas clear. Neck: Supple, with full range of motion. No jugular venous distention. Trachea midline. Respiratory:  Normal respiratory effort. Clear to auscultation, bilaterally without Rales/Wheezes/Rhonchi. Cardiovascular: Regular rate and rhythm with normal S1/S2 without murmurs, rubs or gallops. Abdomen: Soft, non-tender, non-distended with normal bowel sounds. Musculoskeletal: No clubbing, cyanosis or edema bilaterally. Full range of motion without deformity. Skin: Skin color, texture, turgor normal.  No rashes or lesions. Neurologic:  Neurovascularly intact without any focal sensory/motor deficits. Cranial nerves: II-XII intact, grossly non-focal.  Psychiatric: Alert and oriented, thought content appropriate, normal insight  Capillary Refill: Brisk,< 3 seconds   Peripheral Pulses: +2 palpable, equal bilaterally       Labs:   Recent Labs     12/30/20  1005 12/31/20  0745   WBC 12.1* 8.8   HGB 9.8* 10.3*   HCT 30.7* 31.1*    245     Recent Labs     12/30/20  0847 12/30/20  1005 12/31/20  0745   *  --  131*   K 6.8* 6.5* 4.7   CL 92*  --  92*   CO2 27  --  27   BUN 70*  --  45*   CREATININE 5.9*  --  5.5*   CALCIUM 9.4  --  9.0     No results for input(s): AST, ALT, BILIDIR, BILITOT, ALKPHOS in the last 72 hours. No results for input(s): INR in the last 72 hours. No results for input(s): Melton Bacilio in the last 72 hours. Assessment/Plan:    Severe polyneuropathy--attributed to diabetic neuropathy .   Recent thoracic, cervical and lumbar MRIs showed only moderate right L5 narrowing with L5 S1 disc protrusion.  Recent work-up ruled out demyelinating disease/GBS      -Recurrent falls with weakness/ambulatory dysfunction due to above-        -Junctional bradycardia--heart rate in the 40s--improved-currently on hold        -ESRD on hemodialysis MWF     -Severe hyperkalemia-potassium 6.8--- improved     -Hyponatremia-sodium 130-improving     -Obstructive sleep apnea-on CPAP at bedtime     -Diabetes mellitus type 2, uncontrolled-insulin-dependent     -Peripheral vascular disease status post bilateral stents--diminished pulses currently     -Nonischemic cardiomyopathy/chronic systolic heart failure, EF 43%     -History of TAVR for aortic stenosis     -Asthma/COPD -stable on inhaled steroids and bronchodilators     PLAN:     -Continue PT and OT  -Appreciate all consult services  -Continue current treatment  -Discharge planning to SNF     DVT Prophylaxis: Lovenox  Diet: DIET RENAL; Carb Control: 4 carb choices (60 gms)/meal  Code Status: Full Code           Bea Lay MD

## 2020-12-31 NOTE — CONSULTS
2017    Arthritis     hands and hips    Asthma     Bilateral hilar adenopathy syndrome 6/3/2013    CAD (coronary artery disease)     Dr. Tiago Dominguez [facility]) 04/19/2019    EF= 43%    CHF (congestive heart failure) (HCC)     Chronic pain     COPD (chronic obstructive pulmonary disease) ([address]     pulmonology Dr. Kaylene Ortega    Depression     Diabetes mellitus ([address]     borderline    Difficult intravenous access     Emphysema of lung ([address]     ESRD (end stage renal disease) on dialysis ([address]     MWF    Fear of needles     Gastric ulcer     GERD (gastroesophageal reflux disease)     Heart valve problem     bicuspic valve    Hemodialysis patient ([address]     History of spinal fracture     work incident    Hx of blood clots     Bilateral lower extremities; stents in place    Hyperlipidemia     Hypertension     MI (myocardial infarction) ([address] 2019    has had 9 MIs. 2019 was the last    Neuromuscular disorder ([address]     due to CVA    Numbness and tingling in left arm     from fistula    Pneumonia     PONV (postoperative nausea and vomiting)     Prolonged emergence from general anesthesia     States requires more medication than most people    Sleep apnea     Uses CPAP    Stroke ([address]     7mm thalamic cva 2017 deficts left side, left side weakness    TIA (transient ischemic attack)     Unspecified diseases of blood and blood-forming organs        PAST SURGICAL HISTORY    Past Surgical History:   Procedure Laterality Date    AORTIC VALVE REPLACEMENT N/A 10/15/2019    TRANSCATHETER AORTIC VALVE REPLACEMENT FEMORAL APPROACH performed by Vadim Valera MD at [address] [place] [address] Ave Right 7/2/2019    PERITONEAL DIALYSIS CATHETER REMOVAL performed by Rupinder Mauricio MD at [facility] COLONOSCOPY  2/29/2015    WN    CORONARY ANGIOPLASTY WITH STENT PLACEMENT  05/26/15    CYST REMOVAL  08/14/2013    EXCISION CYSTS, NECK X2 AND ABDOMINAL benign  DIAGNOSTIC CARDIAC CATH LAB PROCEDURE      DIALYSIS FISTULA CREATION Left 10/30/2017    LEFT BRACHIAL CEPHALIC FISTULA    DIALYSIS FISTULA CREATION Left 3/27/2019    LIGATION  AV FISTULA performed by Ronnie Wilkinson MD at [facility], COLON, DIAGNOSTIC      OTHER SURGICAL HISTORY  2017    laparoscopic cholecystectomy with intraoperative cholangiogram    OTHER SURGICAL HISTORY  2018    PORT PLACEMENT  - vas cath    OTHER SURGICAL HISTORY Bilateral 2018    laprascopic peritoneal dialysis catheter placement    OTHER SURGICAL HISTORY Right 2018    peritoneal dialysis port placed on right side of abdomen    OTHER SURGICAL HISTORY  2019    PTA/Stenting R External Iliac artery    [place] LAP INSERTION TUNNELED INTRAPERITONEAL CATHETER N/A 2018    LAPAROSCOPIC PERITONEAL DIALYSIS CATHETER REPLACEMENT performed by Cara Ford MD at [facility] ENDOSCOPY  2016    UPPER GASTROINTESTINAL ENDOSCOPY  2017    possible candida, otherwise normal appearing    VASCULAR SURGERY  aprx 2 years ago    2 stents placed, each side of groin       FAMILY HISTORY    Family History   Problem Relation Age of Onset    Diabetes Mother     Heart Disease Father     Kidney Disease Sister         stage 4-kidney failure    Cancer Sister     Heart Disease Sister     Obesity Sister     Cancer Sister     Heart Disease Sister     Obesity Sister     Alcohol Abuse Brother        SOCIAL HISTORY    Social History     Tobacco Use    Smoking status: Current Every Day Smoker     Packs/day: 1.00     Years: 33.00     Pack years: 33.00     Types: Cigarettes     Last attempt to quit: 2020     Years since quittin.6    Smokeless tobacco: Never Used    Tobacco comment: TRYING TO QUIT 3-7 a day   Substance Use Topics    Alcohol use: Not Currently     Alcohol/week: 0.0 standard drinks     Comment: occ    Drug use:  No ALLERGIES    Allergies   Allergen Reactions    Morphine Nausea And Vomiting       MEDICATIONS    No current facility-administered medications on file prior to encounter. Current Outpatient Medications on File Prior to Encounter   Medication Sig Dispense Refill    oxyCODONE-acetaminophen (PERCOCET) 7.5-325 MG per tablet Take 1 tablet by mouth every 6 hours as needed for Pain.       Insulin Pen Needle 31G X 5 MM MISC 1 each by Does not apply route 4 times daily 300 each 3    hydrOXYzine (VISTARIL) 50 MG capsule TAKE 1 TO 2 CAPSULES BY MOUTH NIGHTLY 60 capsule 5    carvedilol (COREG) 6.25 MG tablet Take 1 tablet by mouth 2 times daily (with meals) 60 tablet 3    lidocaine 4 % external patch Place 1 patch onto the skin daily 30 patch 0    hydrALAZINE (APRESOLINE) 50 MG tablet TAKE 1/2 TABLET BY MOUTH EVERY 8 HOURS 90 tablet 2    B Complex-C-Folic Acid (VIRT-CAPS) 1 MG CAPS TK ONE C PO  QD 90 capsule 1    Continuous Blood Gluc Sensor ([employer]STYLE STEPHANY 14 DAY SENSOR) MISC 1 each by Does not apply route every 14 days 6 each 3    insulin glargine (BASAGLAR KWIKPEN) 100 UNIT/ML injection pen Inject 70 Units into the skin nightly 15 mL 5    traZODone (DESYREL) 150 MG tablet TAKE (1) TABLET BY MOUTH NIGHTLY 30 tablet 10    citalopram (CELEXA) 40 MG tablet TAKE (1) TABLET BY MOUTH DAILY 30 tablet 10    insulin aspart (NOVOLOG FLEXPEN) 100 UNIT/ML injection pen Inject 20 Units into the skin 3 times daily (before meals) 15 pen 5    clopidogrel (PLAVIX) 75 MG tablet TAKE 1 TABLET BY MOUTH ONCE DAILY 90 tablet 3    pravastatin (PRAVACHOL) 80 MG tablet TAKE (1) TABLET BY MOUTH ONCE DAILY 90 tablet 3    QUEtiapine (SEROQUEL) 50 MG tablet TAKE 1 TABLET BY MOUTH EVERY EVENING 30 tablet 5    isosorbide mononitrate (IMDUR) 30 MG extended release tablet TAKE 1 TABLET BY MOUTH EVERY DAY 90 tablet 10    torsemide (DEMADEX) 100 MG tablet Take 1-2 tablets by mouth daily 180 tablet 3    ondansetron (ZOFRAN ODT) 4 MG Suppl ADAM USE AS DIRECTED. 1 Device 0    Alcohol Swabs PADS USE AS DIRECTED 300 each 3    albuterol sulfate  (90 Base) MCG/ACT inhaler Inhale 2 puffs into the lungs every 6 hours as needed for Wheezing 1 Inhaler 3    ipratropium-albuterol (DUONEB) 0.5-2.5 (3) MG/3ML SOLN nebulizer solution Inhale 3 mLs into the lungs every 6 hours as needed for Shortness of Breath 360 mL 1    Lancets MISC Test daily 100 each 3    calcium carbonate (TUMS) 500 MG chewable tablet Take 1 tablet by mouth 3 times daily as needed for Heartburn.  aspirin 81 MG chewable tablet Take 1 tablet by mouth daily. (Patient taking differently: Take 81 mg by mouth daily Indications: stopped on 6/25 for surgery ) 30 tablet 2    gabapentin (NEURONTIN) 100 MG capsule Take 1 capsule by mouth 3 times daily for 5 days. 15 capsule 0    pregabalin (LYRICA) 25 MG capsule Take 1 capsule by mouth 3 times daily for 5 days. 15 capsule 0       Objective: A/O; lying in bed; bilateral dorsal feet marked for pulse checks. Per the pt able to find a pulse on the R Foot but not the L Foot.      /69   Pulse 80   Temp 97.5 °F (36.4 °C)   Resp 16   Ht 5' 9\" (1.753 m)   Wt 257 lb 8 oz (116.8 kg)   SpO2 94%   BMI 38.03 kg/m²     LABS:  WBC:    Lab Results   Component Value Date    WBC 8.8 12/31/2020     H/H:    Lab Results   Component Value Date    HGB 10.3 12/31/2020    HCT 31.1 12/31/2020     PTT:    Lab Results   Component Value Date    APTT 27.5 07/02/2019   [APTT}  PT/INR:    Lab Results   Component Value Date    PROTIME 11.6 12/07/2020    INR 1.00 12/07/2020     HgBA1c:    Lab Results   Component Value Date    LABA1C 8.6 12/31/2020       Assessment: Bilateral Feet/Ankle - dry and intact eschar   Isaac Risk Score: Isaac Scale Score: 18    Patient Active Problem List   Diagnosis Code    Acute respiratory failure with hypoxia and hypercapnia (HCC) J96.01, J96.02    Chronic dCHF (grade 2 LVDD) I50.9    Chronic obstructive pulmonary disease ([facility] J44.9    CAD (coronary artery disease) I25.10    PVD (peripheral vascular disease) ([facility]) I73.9    Nonischemic cardiomyopathy ([facility]) I42.8    Sleep apnea G47.30    Bicuspid aortic valve Q23.1    Bilateral hilar adenopathy syndrome R59.0    Claudication in peripheral vascular disease ([facility]) I73.9    Essential hypertension I10    Diabetic neuropathy (HCC) E11.40    Type 2 diabetes, uncontrolled, with neuropathy ([facility]) E11.40, E11.65    Passive smoke exposure Z77.22    Depression with anxiety F41.8    Hematoma T14. 8XXA    Pneumonia of right upper lobe due to infectious organism J18.9    DM (diabetes mellitus), secondary, uncontrolled, w/neurologic complic ([facility] P44.88, D45.31    Hypertensive heart disease with congestive heart failure with preserved left ventricular function ([facility]) I11.0, I50.30    CHF exacerbation ([facility]) I50.9    Chest pain R07.9    Coronary artery disease involving native coronary artery of native heart without angina pectoris I25.10    Obesity (BMI 30-39. 9) E66.9    ZAINAB on CPAP G47.33, Z99.89    Degeneration of lumbar or lumbosacral intervertebral disc M51.37    Lumbar radiculopathy M54.16    Lumbosacral spondylosis without myelopathy T70.336    Biliary colic V63.55    Symptomatic cholelithiasis K80.20    Gastroparesis due to DM ([facility]) E11.43, K31.84    Angina, class IV ([facility]) I20.9    Dyspnea R06.00    Elevated brain natriuretic peptide (BNP) level R79.89    Dyslipidemia E78.5    Respiratory distress R06.03    Hypoxia R09.02    Chest pressure R07.89    Hypertensive urgency I16.0    Acute on chronic combined systolic and diastolic heart failure (HCC) I50.43    Ischemic cardiomyopathy I25.5    Chronic renal failure, stage 5 ([facility]) N18.5    Tobacco abuse Z72.0    CKD stage 4 due to type 2 diabetes mellitus (HCC) E11.22, N18.4    CVA (cerebral vascular accident) ([facility] I63.9    Arterial ischemic stroke, ICA, right, acute ([facility]) I63.231    Type 2 diabetes mellitus without complication, without long-term current use of insulin ([facility]) E11.9    HTN (hypertension), benign I10    ZAINAB (obstructive sleep apnea) G47.33    Diarrhea R19.7    Pleural effusion J90    Chronic anemia D64.9    Nonrheumatic aortic valve stenosis I35.0    Hypervolemia E87.70    Hyperkalemia E87.5    Mucus plugging of bronchi J98.09    Hemodialysis-associated hypotension I95.3    Left arm pain M79.602    Dizziness R42    ESRD (end stage renal disease) on dialysis ([facility]) N18.6, Z99.2    Hypotension due to drugs I95.2    Acute diastolic CHF (congestive heart failure) ([facility]) I50.31    Neuromuscular disorder ([facility]) G70.9    Acute combined systolic and diastolic CHF, NYHA class 4 ([facility]) I50.41    Renovascular hypertension I15.0    Mixed hyperlipidemia E78.2    Cigarette nicotine dependence in remission F17.211    Acute respiratory failure ([facility]) J96.00    Pulmonary edema J81.1    Fluid overload A07.12    Diastolic dysfunction L01.43    Anemia of chronic disease D63.8    SOB (shortness of breath) R06.02    Steal syndrome of dialysis vascular access ([facility]) T82.898A    Chronic, continuous use of opioids F11.90    Chronic bronchitis ([facility]) J42    Nasal congestion R09.81    Hypercholesteremia E78.00    Bradycardia R00.1    S/P TAVR (transcatheter aortic valve replacement) Z95.2    Syncope and collapse R55    Atrial fibrillation ([facility]) I48.91    Former smoker Z87.891    Generalized weakness R53.1    DKA, type 1, not at goal [facility]) E10.10    Bilateral leg weakness R29.898    GBS (Guillain-Foosland syndrome) ([facility]) G61.0    Sinus pause I45.5    Weakness of both lower extremities R29.898    Sinus bradycardia R00.1    Ataxia R27.0       Measurements:  Wound 12/31/20 Foot Left (Active)   Wound Image   12/31/20 1720   Wound Etiology Traumatic 12/31/20 1720   Dressing Status Dry; Intact 12/31/20 1720   Dressing/Treatment Betadine swabs/povidone iodine 12/31/20 1720   Dressing Change Due 01/01/21 12/31/20 1720   Wound Length (cm) 0.7 cm 12/31/20 1720   Wound Width (cm) 1.7 cm 12/31/20 1720   Wound Depth (cm) 0 cm 12/31/20 1720   Wound Surface Area (cm^2) 1.19 cm^2 12/31/20 1720   Wound Volume (cm^3) 0 cm^3 12/31/20 1720   Wound Assessment Eschar dry 12/31/20 1720   Drainage Amount None 12/31/20 1720   Odor None 12/31/20 1720   Liberty-wound Assessment Dry/flaky 12/31/20 1720   Margins Attached edges; Defined edges 12/31/20 1720   Number of days: 0    L Foot:         Wound 12/31/20 Foot Right (Active)   Wound Image   12/31/20 1720   Wound Etiology Traumatic 12/31/20 1720   Dressing Status Dry; Intact 12/31/20 1720   Dressing/Treatment Betadine swabs/povidone iodine 12/31/20 1720   Dressing Change Due 01/01/21 12/31/20 1720   Wound Length (cm) 1 cm 12/31/20 1720   Wound Width (cm) 3 cm 12/31/20 1720   Wound Depth (cm) 0 cm 12/31/20 1720   Wound Surface Area (cm^2) 3 cm^2 12/31/20 1720   Wound Volume (cm^3) 0 cm^3 12/31/20 1720   Wound Assessment Eschar dry 12/31/20 1720   Drainage Amount None 12/31/20 1720   Odor None 12/31/20 1720   Liberty-wound Assessment Dry/flaky; Blanchable erythema 12/31/20 1720   Margins Attached edges; Defined edges 12/31/20 1720   Number of days: 0   R Foot:      R Great Toe:      Some discoloration at the tip and appears to be a blister. Pt states no trauma to the toe. Concerned d/t vascular issues. Response to treatment:  Well tolerated by patient.      Pain Assessment:  Severity:  0 / 10  Quality of pain: N/A  Wound Pain Timing/Severity: none  Premedicated: No    Plan   Plan of Care: Wound 12/31/20 Foot Left-Dressing/Treatment: Betadine swabs/povidone iodine  Wound 12/31/20 Foot Right-Dressing/Treatment: Betadine swabs/povidone iodine   Recommendation: Bilateral Feet/Ankle - paint with betadine daily  Call Wound Care for deterioration [phone]    Specialty Bed Required : No   [] Low Air Loss   [] Pressure Redistribution  [] Fluid Immersion  [] Bariatric  [] Total Pressure Relief  [] Other:     Current Diet: DIET RENAL; Carb Control: 4 carb choices (60 gms)/meal  Dietician consult:  No    Discharge Plan:  Placement for patient upon discharge: home with support    Patient appropriate for Outpatient [address]: No    Referrals:  [x]  following  [] [address] [employer] [facility]  [] Supplies  [] Other    Patient/Caregiver Teaching:  Level of patient/caregiver understanding able to:   [x] Indicates understanding       [] Needs reinforcement  [] Unsuccessful      [] Verbal Understanding  [] Demonstrated understanding       [] No evidence of learning  [] Refused teaching         [] N/A       Electronically signed by Jacob James, RN, [place] Due on 12/31/2020 at 5:23 PM

## 2020-12-31 NOTE — PROGRESS NOTES
Progress Note    HISTORY     CC:  Fall and weakness           We are following for ESRD       Subjective/   HPI:  HD yesterday. BP is stable and HR has come up. Dizziness has improved but not his numbness in his legs. He did not sleep well last night. ROS:  Constitutional:  No fevers, No Chills, + weakness  Cardiovascular:  No palpations  Respiratory:  No wheezing, no cough  Skin:  No rash, no itching    Social Hx:  No family at bedside     Past Medical and Surgical History:  - Reviewed, no changes     EXAM       Objective/     Vitals:    12/30/20 1542 12/30/20 2004 12/31/20 0545 12/31/20 0725   BP: (!) 177/74 (!) 117/54 129/78    Pulse: 85 84 83    Resp:  18 18    Temp: 97.6 °F (36.4 °C) 98 °F (36.7 °C) 98.4 °F (36.9 °C)    TempSrc: Axillary Oral Oral    SpO2: 98% 96% 93% 93%   Weight:       Height:         24HR INTAKE/OUTPUT:      Intake/Output Summary (Last 24 hours) at 12/31/2020 0930  Last data filed at 12/31/2020 2685  Gross per 24 hour   Intake 640 ml   Output 3600 ml   Net -2960 ml     Constitutional:  NAD  Eyes:  Pupils reactive, sclera clear   Neck:  Normal thyroid, no masses   Cardiovascular:  Regular, no rub  Respiratory:  No distress, no wheezing  Psychiatry:  Flat mood/affect, alert  Abdomen: +bs, soft, nt, no masses   Musculoskeletal: No LE edema, no clubbing   Lymphatics:  No LAD in neck, no supraclavicular nodes   Access:  [facility]      MEDICAL DECISION MAKING       Data/  Recent Labs     12/30/20  1005 12/31/20  0745   WBC 12.1* 8.8   HGB 9.8* 10.3*   HCT 30.7* 31.1*   MCV 92.0 90.0    245     Recent Labs     12/30/20  0847 12/30/20  1005 12/31/20  0745   *  --  131*   K 6.8* 6.5* 4.7   CL 92*  --  92*   CO2 27  --  27   GLUCOSE 177*  --  132*   BUN 70*  --  45*   CREATININE 5.9*  --  5.5*   LABGLOM 10*  --  11*   GFRAA 12*  --  13*       Assessment/     ESRD:  On dialysis with a left TDC. Had a fistula but developed vascular access steal and failed PD.   Has been declined for kidney transplant due to medical conditions      Weakness:  Seen by neurology at last admission. Planning for placement at nursing home but then went home after some improvement. Had MRI and LP for LE numbness. Diagnosed with neuropathy     Anemia:  At target      LE pressure ulcers: Will need wound care      Dizziness/weakness/fall:  - Worse with hyperkalemia. Has improved but legs remain numb      Hyperkalemia:  Due to ESRD and diet.   Resolved with dialysis     Plan/     HD on Sat and Monday for holiday schedule unless urgent need develops  Follow labs    -----------------------------  Andie Croft M.D.   [facility]

## 2020-12-31 NOTE — PROGRESS NOTES
[facility]          Cardiology                                                                Progress Note    Admission date:  2020    Subjective:   CC bradycardia  HPI pt up in chair, feels better. Rhythm has been sinus with HRs in 80's. Vitals:  Blood pressure 117/79, pulse 84, temperature 98.1 °F (36.7 °C), temperature source Oral, resp. rate 16, height 5' 9\" (1.753 m), weight 257 lb 8 oz (116.8 kg), SpO2 96 %.   Temp  Av.9 °F (36.6 °C)  Min: 97.5 °F (36.4 °C)  Max: 98.4 °F (36.9 °C)  Pulse  Av.9  Min: 45  Max: 89  BP  Min: 102/49  Max: 177/74  SpO2  Av.3 %  Min: 93 %  Max: 98 %    24 hour I/O    Intake/Output Summary (Last 24 hours) at 2020 1200  Last data filed at 2020 1134  Gross per 24 hour   Intake 1000 ml   Output 3600 ml   Net -2600 ml       Objective:     Telemetry monitor: SR    Physical Exam:  General Appearance:  comfortable  HEENT: pupils equal and reactive, no scleral  icterus  Skin:  Warm and dry  Heart:  Regular, normal apex, S1 and S2 normal  Lungs:  Clear, no wheezing  Abd: obese,soft, non tender  Extremities:  No edema, no cyanosis  Psych: normal affect, oriented X 3      Lab Review     Renal Profile:   Lab Results   Component Value Date    CREATININE 5.5 2020    BUN 45 2020     2020    K 4.7 2020    CL 92 2020    CO2 27 2020     CBC:    Lab Results   Component Value Date    WBC 8.8 2020    RBC 3.45 2020    HGB 10.3 2020    HCT 31.1 2020    MCV 90.0 2020    RDW 14.2 2020     2020     BNP:    Lab Results   Component Value Date    BNP 72 2013     Fasting Lipid Panel:    Lab Results   Component Value Date    CHOL 170 2019    HDL 49 2019    TRIG 231 2019     Cardiac Enzymes:  CK/MbTroponin  Lab Results   Component Value Date    CKTOTAL 167 2020    TROPONINI 0.04 2020     PT/ INR   Lab Results   Component Value Date    INR 1.00 12/07/2020    INR 0.91 04/25/2020    INR 0.89 02/03/2020    PROTIME 11.6 12/07/2020    PROTIME 10.6 04/25/2020    PROTIME 10.3 02/03/2020     PTT No results found for: PTT   Lab Results   Component Value Date    MG 2.10 04/26/2020      Lab Results   Component Value Date    TSH 0.90 06/05/2020       Assessment:     1. SB- transient. He has brief episodes of sinus bradycardia which are likely asymptomatic. The episode 12/30 was associated with hyperkalemia, but this has not consistently been the case. 2. Gait instability related to bilateral LE weakness and paresthesias. 3. ESRD on HD  4. S/P TAVR- no evidence for AV block  5. CAD    Plan:     1. Hold Coreg  2. ECG telemetry monitoring  3. Careful attention to acid-base and electrolyte abnormalities  4.  No current plan for cardiac pacing    Doni Johnson MD

## 2020-12-31 NOTE — PROGRESS NOTES
Physical Therapy  Facility/Department: [facility] B3 - MED SURG  Daily Treatment Note  NAME: Heather Kraus  : 1970  MRN: [id number]    Date of Service: 2020    Discharge Recommendations:  Subacute/Skilled Nursing Facility   PT Equipment Recommendations  Equipment Needed: No  Other: Defer to facility. Pt does own motorized scooter and rollator. Assessment   Body structures, Functions, Activity limitations: Decreased functional mobility ; Decreased strength;Decreased ROM; Decreased endurance;Decreased balance;Decreased sensation; Increased pain  Assessment: PT tx session focused on functional transfers, gait training and overall activity tolerance. Pt required decreased assistance this date, min-mod A for stand pivot transfer and 4ft ambulation with RW. Pt does require W/C follow at this time secondary to BLE numbness and high fall risk. Pt will benefit from continued skilled PT while admitted to address deficits. Recommend SNF at discharge to progress functional independence and safety as pt is high fall risk with multiple falls at home after last admission. Treatment Diagnosis: Impaired sensation and weakness BLEs. Prognosis: Fair  Decision Making: Medium Complexity  PT Education: Goals;PT Role;Plan of Care;Transfer Training;General Safety;Gait Training;Disease Specific Education; Functional Mobility Training  Patient Education: Pt educated on importance of participating in therapy - verbalized understanding  REQUIRES PT FOLLOW UP: Yes  Activity Tolerance  Activity Tolerance: Patient limited by pain; Patient limited by endurance  Activity Tolerance: VSS throughout, no c/o chest pain or SOB. Distance limited by back pain and BLE fatigue. Patient Diagnosis(es): The primary encounter diagnosis was Fall, initial encounter. A diagnosis of Diabetic polyneuropathy associated with type 2 diabetes mellitus ([facility] [address] was also pertinent to this visit.      has a past medical history of Ambulatory dysfunction, Aortic fall risk. Distance: 4ft  Stairs/Curb  Stairs?: No     Balance  Posture: Fair  Sitting - Static: Good  Sitting - Dynamic: Good  Standing - Static: Good;-  Standing - Dynamic: Poor  Comments: Pt tolerated ~30 seconds standing with BLEs braced on bed with supervision while utilizing urinal.  Exercises  Hip Flexion: x10 B mini marches  Hip Abduction: x10 B  Knee Long Arc Quad: x10 B  Ankle Pumps: x10 B         AM-PAC Score     AM-PAC Inpatient Mobility without Stair Climbing Raw Score : 10 (12/31/20 1001)  AM-PAC Inpatient without Stair Climbing T-Scale Score : 34.07 (12/31/20 1001)  Mobility Inpatient CMS 0-100% Score: 71.66 (12/31/20 1001)  Mobility Inpatient without Stair CMS G-Code Modifier : CL (12/31/20 1001)       Goals  Short term goals  Time Frame for Short term goals: 1 week, 1/6/21 unless otherwise noted. Short term goal 1: Pt will perform bed mobility with supervision - 12/31: SBA  Short term goal 2: Pt will perform sit<>stand transfer with CGA - 12/31: mod A  Short term goal 3: Pt will ambulate 25ft with LRAD and min A - 12/31: 4ft with min A + chair follow. Short term goal 4: By 1/2/21 pt will tolerate x10-12 reps BLE exercise to assist with functional transfers and ambulation. - MET 12/31  Patient Goals   Patient goals : \"To go to rehab and get stronger. \"    Plan    Plan  Times per week: 3-5x/wk  Times per day: Daily  Current Treatment Recommendations: Strengthening, Balance Training, Transfer Training, Gait Training, Functional Mobility Training, Endurance Training, Safety Education & Training, Patient/Caregiver Education & Training  Safety Devices  Type of devices:  All fall risk precautions in place, Call light within reach, Chair alarm in place, Gait belt, Patient at risk for falls, Left in chair, Sitter present, Nurse notified  Restraints  Initially in place: No     Therapy Time   Individual Concurrent Group Co-treatment   Time In 0918         Time Out 0944         Minutes 26         Timed Code Treatment Minutes: [address], PT       If pt is unable to be seen after this session, please let this note serve as discharge summary. Please see case management note for discharge disposition. Thank you.

## 2021-01-01 ENCOUNTER — APPOINTMENT (OUTPATIENT)
Dept: CT IMAGING | Age: 51
DRG: 073 | End: 2021-01-01
Payer: COMMERCIAL

## 2021-01-01 LAB
ANION GAP SERPL CALCULATED.3IONS-SCNC: 12 MMOL/L (ref 3–16)
BASOPHILS ABSOLUTE: 0.1 K/UL (ref 0–0.2)
BASOPHILS RELATIVE PERCENT: 1.1 %
BUN BLDV-MCNC: 57 MG/DL (ref 7–20)
CALCIUM SERPL-MCNC: 8.8 MG/DL (ref 8.3–10.6)
CHLORIDE BLD-SCNC: 90 MMOL/L (ref 99–110)
CO2: 28 MMOL/L (ref 21–32)
CREAT SERPL-MCNC: 6 MG/DL (ref 0.9–1.3)
EOSINOPHILS ABSOLUTE: 0.3 K/UL (ref 0–0.6)
EOSINOPHILS RELATIVE PERCENT: 5 %
GFR AFRICAN AMERICAN: 12
GFR NON-AFRICAN AMERICAN: 10
GLUCOSE BLD-MCNC: 139 MG/DL (ref 70–99)
GLUCOSE BLD-MCNC: 146 MG/DL (ref 70–99)
GLUCOSE BLD-MCNC: 164 MG/DL (ref 70–99)
GLUCOSE BLD-MCNC: 177 MG/DL (ref 70–99)
GLUCOSE BLD-MCNC: 198 MG/DL (ref 70–99)
HCT VFR BLD CALC: 28.1 % (ref 40.5–52.5)
HEMOGLOBIN: 9.1 G/DL (ref 13.5–17.5)
LYMPHOCYTES ABSOLUTE: 1 K/UL (ref 1–5.1)
LYMPHOCYTES RELATIVE PERCENT: 15.6 %
MCH RBC QN AUTO: 29.4 PG (ref 26–34)
MCHC RBC AUTO-ENTMCNC: 32.5 G/DL (ref 31–36)
MCV RBC AUTO: 90.7 FL (ref 80–100)
MONOCYTES ABSOLUTE: 0.5 K/UL (ref 0–1.3)
MONOCYTES RELATIVE PERCENT: 7.6 %
NEUTROPHILS ABSOLUTE: 4.4 K/UL (ref 1.7–7.7)
NEUTROPHILS RELATIVE PERCENT: 70.7 %
PDW BLD-RTO: 14 % (ref 12.4–15.4)
PERFORMED ON: ABNORMAL
PLATELET # BLD: 218 K/UL (ref 135–450)
PMV BLD AUTO: 8.1 FL (ref 5–10.5)
POTASSIUM SERPL-SCNC: 4.5 MMOL/L (ref 3.5–5.1)
RBC # BLD: 3.09 M/UL (ref 4.2–5.9)
SODIUM BLD-SCNC: 130 MMOL/L (ref 136–145)
WBC # BLD: 6.2 K/UL (ref 4–11)

## 2021-01-01 PROCEDURE — 2580000003 HC RX 258: Performed by: HOSPITALIST

## 2021-01-01 PROCEDURE — 6370000000 HC RX 637 (ALT 250 FOR IP): Performed by: EMERGENCY MEDICINE

## 2021-01-01 PROCEDURE — 6370000000 HC RX 637 (ALT 250 FOR IP): Performed by: HOSPITALIST

## 2021-01-01 PROCEDURE — 80048 BASIC METABOLIC PNL TOTAL CA: CPT

## 2021-01-01 PROCEDURE — 2700000000 HC OXYGEN THERAPY PER DAY

## 2021-01-01 PROCEDURE — 36415 COLL VENOUS BLD VENIPUNCTURE: CPT

## 2021-01-01 PROCEDURE — 85025 COMPLETE CBC W/AUTO DIFF WBC: CPT

## 2021-01-01 PROCEDURE — 2500000003 HC RX 250 WO HCPCS: Performed by: HOSPITALIST

## 2021-01-01 PROCEDURE — 1200000000 HC SEMI PRIVATE

## 2021-01-01 PROCEDURE — 94660 CPAP INITIATION&MGMT: CPT

## 2021-01-01 PROCEDURE — 6360000002 HC RX W HCPCS: Performed by: HOSPITALIST

## 2021-01-01 PROCEDURE — G0378 HOSPITAL OBSERVATION PER HR: HCPCS

## 2021-01-01 PROCEDURE — 94640 AIRWAY INHALATION TREATMENT: CPT

## 2021-01-01 PROCEDURE — 6360000004 HC RX CONTRAST MEDICATION: Performed by: HOSPITALIST

## 2021-01-01 PROCEDURE — 94761 N-INVAS EAR/PLS OXIMETRY MLT: CPT

## 2021-01-01 PROCEDURE — 99231 SBSQ HOSP IP/OBS SF/LOW 25: CPT | Performed by: SURGERY

## 2021-01-01 PROCEDURE — 75635 CT ANGIO ABDOMINAL ARTERIES: CPT

## 2021-01-01 RX ADMIN — Medication 10 ML: at 08:00

## 2021-01-01 RX ADMIN — OXYCODONE HYDROCHLORIDE AND ACETAMINOPHEN 1 TABLET: 7.5; 325 TABLET ORAL at 23:04

## 2021-01-01 RX ADMIN — TIZANIDINE 4 MG: 4 TABLET ORAL at 23:04

## 2021-01-01 RX ADMIN — PREGABALIN 25 MG: 25 CAPSULE ORAL at 23:04

## 2021-01-01 RX ADMIN — QUETIAPINE FUMARATE 50 MG: 25 TABLET ORAL at 23:04

## 2021-01-01 RX ADMIN — DULOXETINE HYDROCHLORIDE 30 MG: 30 CAPSULE, DELAYED RELEASE ORAL at 07:58

## 2021-01-01 RX ADMIN — CLOPIDOGREL BISULFATE 75 MG: 75 TABLET, FILM COATED ORAL at 07:59

## 2021-01-01 RX ADMIN — OXYCODONE HYDROCHLORIDE AND ACETAMINOPHEN 1 TABLET: 7.5; 325 TABLET ORAL at 13:00

## 2021-01-01 RX ADMIN — HEPARIN SODIUM 5000 UNITS: 5000 INJECTION INTRAVENOUS; SUBCUTANEOUS at 23:03

## 2021-01-01 RX ADMIN — PREGABALIN 25 MG: 25 CAPSULE ORAL at 13:00

## 2021-01-01 RX ADMIN — HYDRALAZINE HYDROCHLORIDE 25 MG: 25 TABLET, FILM COATED ORAL at 05:04

## 2021-01-01 RX ADMIN — INSULIN LISPRO 20 UNITS: 100 INJECTION, SOLUTION INTRAVENOUS; SUBCUTANEOUS at 17:46

## 2021-01-01 RX ADMIN — GLYCOPYRROLATE AND FORMOTEROL FUMARATE 2 PUFF: 9; 4.8 AEROSOL, METERED RESPIRATORY (INHALATION) at 19:35

## 2021-01-01 RX ADMIN — TIZANIDINE 4 MG: 4 TABLET ORAL at 13:00

## 2021-01-01 RX ADMIN — PRAVASTATIN SODIUM 80 MG: 80 TABLET ORAL at 23:04

## 2021-01-01 RX ADMIN — HYDRALAZINE HYDROCHLORIDE 25 MG: 25 TABLET, FILM COATED ORAL at 23:13

## 2021-01-01 RX ADMIN — PREGABALIN 25 MG: 25 CAPSULE ORAL at 07:59

## 2021-01-01 RX ADMIN — PREGABALIN 25 MG: 25 CAPSULE ORAL at 23:14

## 2021-01-01 RX ADMIN — INSULIN LISPRO 2 UNITS: 100 INJECTION, SOLUTION INTRAVENOUS; SUBCUTANEOUS at 17:45

## 2021-01-01 RX ADMIN — CITALOPRAM HYDROBROMIDE 40 MG: 20 TABLET ORAL at 07:59

## 2021-01-01 RX ADMIN — HEPARIN SODIUM 5000 UNITS: 5000 INJECTION INTRAVENOUS; SUBCUTANEOUS at 05:04

## 2021-01-01 RX ADMIN — CALCIUM ACETATE 667 MG: 667 CAPSULE ORAL at 17:44

## 2021-01-01 RX ADMIN — TORSEMIDE 100 MG: 100 TABLET ORAL at 07:59

## 2021-01-01 RX ADMIN — HYDRALAZINE HYDROCHLORIDE 25 MG: 25 TABLET, FILM COATED ORAL at 13:00

## 2021-01-01 RX ADMIN — Medication 10 ML: at 23:13

## 2021-01-01 RX ADMIN — ASPIRIN 81 MG 81 MG: 81 TABLET ORAL at 07:59

## 2021-01-01 RX ADMIN — HEPARIN SODIUM 5000 UNITS: 5000 INJECTION INTRAVENOUS; SUBCUTANEOUS at 12:59

## 2021-01-01 RX ADMIN — TRAZODONE HYDROCHLORIDE 150 MG: 50 TABLET ORAL at 23:04

## 2021-01-01 RX ADMIN — CALCIUM ACETATE 667 MG: 667 CAPSULE ORAL at 13:00

## 2021-01-01 RX ADMIN — IOPAMIDOL 125 ML: 755 INJECTION, SOLUTION INTRAVENOUS at 11:55

## 2021-01-01 RX ADMIN — INSULIN GLARGINE 70 UNITS: 100 INJECTION, SOLUTION SUBCUTANEOUS at 23:05

## 2021-01-01 RX ADMIN — ISOSORBIDE MONONITRATE 30 MG: 30 TABLET, EXTENDED RELEASE ORAL at 08:00

## 2021-01-01 RX ADMIN — GLYCOPYRROLATE AND FORMOTEROL FUMARATE 2 PUFF: 9; 4.8 AEROSOL, METERED RESPIRATORY (INHALATION) at 08:05

## 2021-01-01 RX ADMIN — PANTOPRAZOLE SODIUM 40 MG: 40 TABLET, DELAYED RELEASE ORAL at 05:04

## 2021-01-01 RX ADMIN — LOSARTAN POTASSIUM 50 MG: 25 TABLET, FILM COATED ORAL at 07:59

## 2021-01-01 RX ADMIN — TIZANIDINE 4 MG: 4 TABLET ORAL at 07:59

## 2021-01-01 RX ADMIN — NEPHROCAP 1 MG: 1 CAP ORAL at 07:59

## 2021-01-01 RX ADMIN — INSULIN LISPRO 1 UNITS: 100 INJECTION, SOLUTION INTRAVENOUS; SUBCUTANEOUS at 23:05

## 2021-01-01 ASSESSMENT — PAIN SCALES - GENERAL
PAINLEVEL_OUTOF10: 9
PAINLEVEL_OUTOF10: 4

## 2021-01-01 NOTE — CARE COORDINATION
ETHAN spoke with [facility], about referral stated they can accept medically however, his insurance would have to cover cost of transportation to dialysis. Will also need cert. likely not able to d/c till next week if medically ready.  Sahra Stein RN

## 2021-01-01 NOTE — PROGRESS NOTES
Occupational /Physical Therapy  Attempt Note    Attempted to see pt for OT/PT treatment this date. Pt unable to participate at this time d/t pt off floor at HDU. Will re-attempt as schedule allows.      Michael Stauffer OTR/L #[id number]  Nayan Paez, PT

## 2021-01-01 NOTE — PROGRESS NOTES
Physical/Occupational Therapy    Physical/occupational therapy attempted to treat this patient. However the patient was off the floor and at radiology. PT/OT will re-attempt to treat this patient as able. Thank you.      Cristian Santos PT  Zoie Cardona OT

## 2021-01-01 NOTE — PROGRESS NOTES
VASCULAR    No new events or symptoms. VSS afeb  Exam unchanged    CTA pending    A/P: Leg weakness - not vascular by history and exam   R foot wounds with inflow disease - possible stent occlusion   Would continue heparin until CT completed. Will follow.      Lidia Moon

## 2021-01-01 NOTE — PROGRESS NOTES
12/31/20 2101   NIV Type   $NIV $Daily Charge   Skin Assessment Clean, dry, & intact   NIV Started/Stopped On   Mode CPAP   Mask Type Full face mask   Mask Size Large   Settings/Measurements   CPAP/EPAP 5 cmH2O   Resp 14   FiO2  25 %   Vt Exhaled 732 mL   Minute Volume 8.7 Liters   Mask Leak (lpm) 60 lpm   Comfort Level Good   Using Accessory Muscles No   SpO2 95   Breath Sounds   Right Upper Lobe Diminished   Right Middle Lobe Diminished   Right Lower Lobe Diminished   Left Upper Lobe Diminished   Left Lower Lobe Diminished   Alarm Settings   Alarms On Y   Press Low Alarm 5 cmH2O   High Pressure Alarm 25 cmH2O   Resp Rate Low Alarm 6   High Respiratory Rate 40 br/min

## 2021-01-01 NOTE — PROGRESS NOTES
01/01/21 0113   NIV Type   $NIV $Daily Charge   Mode CPAP   Mask Type Full face mask   Mask Size Large   Settings/Measurements   CPAP/EPAP 5 cmH2O   Resp 13   FiO2  25 %   Vt Exhaled 463 mL   Minute Volume 6 Liters   Mask Leak (lpm) 52 lpm   Comfort Level Good   Using Accessory Muscles No   Breath Sounds   Right Upper Lobe Diminished   Right Middle Lobe Diminished   Right Lower Lobe Diminished   Left Upper Lobe Diminished   Left Lower Lobe Diminished   Alarm Settings   Alarms On Y

## 2021-01-01 NOTE — CONSULTS
VASCULAR SURGERY CONSULTATION    Siria Reddy is a 44 y.o. male well known to my associate Saurabh Gonzalez MD who has dealt with his vascular disease for some time - most recently with placement of R iliac stent 5/28/2019. Admitted with B lower extremity weakness and numbness that prevents him from being able to walk any significant distances or without assistance. Also c/o back pain, leg spasms and near collapse with very distance walking and standing. Has noted areas of dry wounds on both ankles and tips of R toes. No rest pain. Asked to see pt by Dr. Ana María Howard for vascular evaluation and recommendations.     Past Medical History:   Diagnosis Date    Ambulatory dysfunction     walker for long distances, SOB with distance    Aortic stenosis     echo 2017    Arthritis     hands and hips    Asthma     Bilateral hilar adenopathy syndrome 6/3/2013    CAD (coronary artery disease)     Dr. Nya Garcia [facility]) 04/19/2019    EF= 43%    CHF (congestive heart failure) (HCC)     Chronic pain     COPD (chronic obstructive pulmonary disease) ([address]     pulmonology Dr. Jessa Teague    Depression     Diabetes mellitus ([address]     borderline    Difficult intravenous access     Emphysema of lung ([address]     ESRD (end stage renal disease) on dialysis ([address]     MWF    Fear of needles     Gastric ulcer     GERD (gastroesophageal reflux disease)     Heart valve problem     bicuspic valve    Hemodialysis patient ([address]     History of spinal fracture     work incident    Hx of blood clots     Bilateral lower extremities; stents in place    Hyperlipidemia     Hypertension     MI (myocardial infarction) ([address] 2019    has had 9 MIs. 2019 was the last    Neuromuscular disorder ([address]     due to CVA    Numbness and tingling in left arm     from fistula    Pneumonia     PONV (postoperative nausea and vomiting)     Prolonged emergence from general anesthesia     States requires more medication than most people    Sleep apnea     Uses CPAP    Stroke ([facility] [address]     7mm thalamic cva 2017 deficts left side, left side weakness    TIA (transient ischemic attack)     Unspecified diseases of blood and blood-forming organs      Past Surgical History:   Procedure Laterality Date    AORTIC VALVE REPLACEMENT N/A 10/15/2019    TRANSCATHETER AORTIC VALVE REPLACEMENT FEMORAL APPROACH performed by Kalyn Smith MD at [address] Right 7/2/2019    PERITONEAL DIALYSIS CATHETER REMOVAL performed by Pretty Chong MD at [facility] COLONOSCOPY  2/29/2015    WN    CORONARY ANGIOPLASTY WITH STENT PLACEMENT  05/26/15    CYST REMOVAL  08/14/2013    EXCISION CYSTS, NECK X2 AND ABDOMINAL benign    DIAGNOSTIC CARDIAC CATH LAB PROCEDURE      DIALYSIS FISTULA CREATION Left 10/30/2017    LEFT BRACHIAL CEPHALIC FISTULA    DIALYSIS FISTULA CREATION Left 3/27/2019    LIGATION  AV FISTULA performed by Gaby Campa MD at [facility], [place], DIAGNOSTIC      OTHER SURGICAL HISTORY  02/01/2017    laparoscopic cholecystectomy with intraoperative cholangiogram    OTHER SURGICAL HISTORY  2018    PORT PLACEMENT  - vas cath    OTHER SURGICAL HISTORY Bilateral 06/26/2018    laprascopic peritoneal dialysis catheter placement    OTHER SURGICAL HISTORY Right 09/2018    peritoneal dialysis port placed on right side of abdomen    OTHER SURGICAL HISTORY  05/28/2019    PTA/Stenting R External Iliac artery    AL LAP INSERTION TUNNELED INTRAPERITONEAL CATHETER N/A 9/21/2018    LAPAROSCOPIC PERITONEAL DIALYSIS CATHETER REPLACEMENT performed by Pretty Chong MD at [address] [place] [address]  01/06/2016    UPPER GASTROINTESTINAL ENDOSCOPY  01/29/2017    possible candida, otherwise normal appearing    VASCULAR SURGERY  aprx 2 years ago    2 stents placed, each side of groin     Family History   Problem Relation Age of Onset    Diabetes Mother     Heart Disease Father     Kidney Disease Sister         stage 4-kidney failure    Cancer Sister     Heart Disease Sister     Obesity Sister     Cancer Sister     Heart Disease Sister     Obesity Sister     Alcohol Abuse Brother      Social History     Socioeconomic History    Marital status:      Spouse name: None    Number of children: None    Years of education: None    Highest education level: None   Occupational History    None   Social Needs    Financial resource strain: None    Food insecurity     Worry: None     Inability: None    Transportation needs     Medical: None     Non-medical: None   Tobacco Use    Smoking status: Current Every Day Smoker     Packs/day: 1.00     Years: 33.00     Pack years: 33.00     Types: Cigarettes     Last attempt to quit: 2020     Years since quittin.6    Smokeless tobacco: Never Used    Tobacco comment: TRYING TO QUIT 3-7 a day   Substance and Sexual Activity    Alcohol use: Not Currently     Alcohol/week: 0.0 standard drinks     Comment: occ    Drug use: No    Sexual activity: Yes     Partners: Female     Comment:    Lifestyle    Physical activity     Days per week: None     Minutes per session: None    Stress: None   Relationships    Social connections     Talks on phone: None     Gets together: None     Attends [employer] service: None     Active member of club or organization: None     Attends meetings of clubs or organizations: None     Relationship status: None    Intimate partner violence     Fear of current or ex partner: None     Emotionally abused: None     Physically abused: None     Forced sexual activity: None   Other Topics Concern    None   Social History Narrative    None     Current Facility-Administered Medications   Medication Dose Route Frequency Provider Last Rate Last Admin    pregabalin (LYRICA) capsule 25 mg  25 mg Oral Once Cynthia Ana Laura V, DO        aspirin chewable tablet 81 mg  81 mg Oral Daily Shanika Holt MD   81 mg at 12/31/20 1057    albuterol (PROVENTIL) nebulizer solution 2.5 mg  2.5 mg Nebulization Q6H PRN Shanika Holt MD        Calcium Acetate (Phos Binder) CAPS 667 mg  1 capsule Oral TID  Shanika Holt MD   667 mg at 12/31/20 1811    citalopram (CELEXA) tablet 40 mg  40 mg Oral Daily Shanika Holt MD   40 mg at 12/31/20 1059    clopidogrel (PLAVIX) tablet 75 mg  75 mg Oral Daily Shanika Holt MD   75 mg at 12/31/20 1059    DULoxetine (CYMBALTA) extended release capsule 30 mg  30 mg Oral Daily Shanika Holt MD   30 mg at 12/31/20 1100    fluticasone (FLONASE) 50 MCG/ACT nasal spray 1 spray  1 spray Each Nostril BID Shanika Holt MD        hydrALAZINE (APRESOLINE) tablet 25 mg  25 mg Oral 3 times per day Shanika Holt MD   25 mg at 12/31/20 1305    hydrOXYzine (VISTARIL) capsule 50 mg  50 mg Oral Nightly PRN Shanika Holt MD        insulin glargine (LANTUS) injection vial 70 Units  70 Units Subcutaneous Nightly Shanika Holt MD        insulin lispro (HUMALOG) injection vial 20 Units  20 Units Subcutaneous TID  Shanika Holt MD   20 Units at 12/31/20 1303    isosorbide mononitrate (IMDUR) extended release tablet 30 mg  30 mg Oral Daily Shanika Holt MD   30 mg at 12/31/20 1100    losartan (COZAAR) tablet 50 mg  50 mg Oral Daily Shanika Holt MD   50 mg at 12/31/20 1101    pravastatin (PRAVACHOL) tablet 80 mg  80 mg Oral Nightly Shanika Holt MD   80 mg at 12/30/20 2149    pantoprazole (PROTONIX) tablet 40 mg  40 mg Oral QAM AC Shanika Holt MD   40 mg at 12/31/20 0604    QUEtiapine (SEROQUEL) tablet 50 mg  50 mg Oral QPM Shanika Holt MD   50 mg at 12/30/20 2149    glycopyrrolate-formoterol (BEVESPI) 9-4.8 MCG/ACT inhaler 2 puff  2 puff Inhalation BID Shanika Holt MD   2 puff at 12/31/20 0723    torsemide (DEMADEX) tablet 100 mg  100 mg Oral Daily Shanika Holt MD   100 mg at 12/31/20 110    traZODone (DESYREL) tablet 150 mg  150 mg Oral Nightly Tiffany Oconnor MD   150 mg at 12/30/20 2149    tiZANidine (ZANAFLEX) tablet 4 mg  4 mg Oral TID Tiffany Oconnor MD   4 mg at 12/31/20 1306    pregabalin (LYRICA) capsule 25 mg  25 mg Oral TID Tiffany Oconnor MD   25 mg at 12/31/20 1306    oxyCODONE-acetaminophen (PERCOCET) 7.5-325 MG per tablet 1 tablet  1 tablet Oral Q6H PRN Tiffany Oconnor MD   1 tablet at 12/31/20 1547    [START ON 1/1/2021] linaclotide (LINZESS) capsule 145 mcg  145 mcg Oral QAM AC Tiffany Oconnor MD        calcium carbonate (TUMS) chewable tablet 500 mg  1 tablet Oral TID PRN Tiffany Oconnor MD        b complex-C-folic acid (NEPHROCAPS) capsule 1 mg  1 capsule Oral Daily Tiffany Oconnor MD   1 mg at 12/31/20 1058    sodium chloride flush 0.9 % injection 10 mL  10 mL Intravenous 2 times per day Tiffany Oconnor MD   10 mL at 12/31/20 1057    sodium chloride flush 0.9 % injection 10 mL  10 mL Intravenous PRN Tiffany Oconnor MD        promethazine (PHENERGAN) tablet 12.5 mg  12.5 mg Oral Q6H PRN Tiffany Oconnor MD        Or    [facility]etron [facility]) injection 4 mg  4 mg Intravenous Q6H PRN Tiffany Oconnor MD        polyethylene glycol [facility]) packet 17 g  17 g Oral Daily PRN Tiffany Oconnor MD        acetaminophen (TYLENOL) tablet 650 mg  650 mg Oral Q6H PRN Tiffany Oconnor MD        Or    acetaminophen (TYLENOL) suppository 650 mg  650 mg Rectal Q6H PRN Tiffany Oconnor MD        glucose (GLUTOSE) 40 % oral gel 15 g  15 g Oral PRN Tiffany Oconnor MD        dextrose 50 % IV solution  12.5 g Intravenous PRN Tiffany Oconnor MD        glucagon (rDNA) injection 1 mg  1 mg Intramuscular PRN Tiffany Oconnor MD        dextrose 5 % solution  100 mL/hr Intravenous PRN Tiffany Oconnor MD        insulin lispro (HUMALOG) injection vial 0-12 Units  0-12 Units Subcutaneous TID  Tiffany Oconnor MD   6 Units at 12/31/20 1301    insulin lispro (HUMALOG) injection vial 0-6 Units  0-6 Units Subcutaneous Nightly Faby Gao MD        insulin regular (HUMULIN R;NOVOLIN R) injection 10 Units  10 Units Intravenous Once Faby Gao MD   Stopped at 12/30/20 1209    dextrose 50 % IV solution  25 g Intravenous Once Faby Gao MD   Stopped at 12/30/20 1208    calcium gluconate 1 g in sodium chloride 50 mL  1 g Intravenous Once Faby Gao MD        heparin (porcine) injection 4,100 Units  4,100 Units Intravenous Q [facility] Kojo Collado MD   4,100 Units at 12/30/20 1217    doxercalciferol (HECTOROL) injection 3.5 mcg  3.5 mcg Intravenous Q BETTY Collado MD   3.5 mcg at 12/30/20 1500    heparin (porcine) injection 5,000 Units  5,000 Units Subcutaneous 3 times per day Faby Gao MD   5,000 Units at 12/31/20 1303    heparin (porcine) injection 4,000 Units  4,000 Units Intracatheter PRN Kojo Collado MD   4,000 Units at 12/30/20 1530     No current facility-administered medications on file prior to encounter. Current Outpatient Medications on File Prior to Encounter   Medication Sig Dispense Refill    oxyCODONE-acetaminophen (PERCOCET) 7.5-325 MG per tablet Take 1 tablet by mouth every 6 hours as needed for Pain.       Insulin Pen Needle 31G X 5 MM MISC 1 each by Does not apply route 4 times daily 300 each 3    hydrOXYzine (VISTARIL) 50 MG capsule TAKE 1 TO 2 CAPSULES BY MOUTH NIGHTLY 60 capsule 5    carvedilol (COREG) 6.25 MG tablet Take 1 tablet by mouth 2 times daily (with meals) 60 tablet 3    lidocaine 4 % external patch Place 1 patch onto the skin daily 30 patch 0    hydrALAZINE (APRESOLINE) 50 MG tablet TAKE 1/2 TABLET BY MOUTH EVERY 8 HOURS 90 tablet 2    B Complex-C-Folic Acid (VIRT-CAPS) 1 MG CAPS TK ONE C PO  QD 90 capsule 1    Continuous Blood Gluc Sensor (FREESTYLE STEPHANY 14 DAY SENSOR) MISC 1 each by Does not apply route every 14 days 6 each 3    insulin glargine (BASAGLAR KWIKPEN) 100 UNIT/ML daily 100 each 3    nitroGLYCERIN (NITROSTAT) 0.4 MG SL tablet DISSOLVE 1 TABLET UNDER THE TONGUE AS NEEDED FOR CHEST PAIN EVERY 5 MINUTES UP TO 3 TIMES. IF NO RELIEF CALL 911. 25 tablet 10    blood glucose test strips (FREESTYLE LITE) strip Daily As needed. 100 strip 3    glucose monitoring kit (FREESTYLE) monitoring kit 1 kit by Does not apply route daily 1 kit 0    vitamin D (ERGOCALCIFEROL) 88606 units CAPS capsule TK 1 C PO WEEKLY  11    flunisolide (NASALIDE) 25 MCG/ACT (0.025%) SOLN Inhale 2 sprays into the lungs every 12 hours 1 Bottle 5    Tiotropium Bromide-Olodaterol (STIOLTO RESPIMAT) 2.5-2.5 MCG/ACT AERS Inhale 2 puffs into the lungs daily 2 Inhaler 0    Polyethylene Glycol 3350 GRAN       Glucose Blood (BLOOD GLUCOSE TEST STRIPS) STRP TEST 3-4 TIMES DAILY, AS DIRECTED 100 strip 3    Blood Glucose Monitoring Suppl ADAM USE AS DIRECTED. 1 Device 0    Alcohol Swabs PADS USE AS DIRECTED 300 each 3    albuterol sulfate  (90 Base) MCG/ACT inhaler Inhale 2 puffs into the lungs every 6 hours as needed for Wheezing 1 Inhaler 3    ipratropium-albuterol (DUONEB) 0.5-2.5 (3) MG/3ML SOLN nebulizer solution Inhale 3 mLs into the lungs every 6 hours as needed for Shortness of Breath 360 mL 1    Lancets MISC Test daily 100 each 3    calcium carbonate (TUMS) 500 MG chewable tablet Take 1 tablet by mouth 3 times daily as needed for Heartburn.  aspirin 81 MG chewable tablet Take 1 tablet by mouth daily. (Patient taking differently: Take 81 mg by mouth daily Indications: stopped on 6/25 for surgery ) 30 tablet 2    gabapentin (NEURONTIN) 100 MG capsule Take 1 capsule by mouth 3 times daily for 5 days. 15 capsule 0    pregabalin (LYRICA) 25 MG capsule Take 1 capsule by mouth 3 times daily for 5 days. 15 capsule 0     Allergies   Allergen Reactions    Morphine Nausea And Vomiting       Review of Systems  Pertinent items are noted in HPI.       Objective:     /69   Pulse 80   Temp 97.5 °F (36.4 °C)   Resp 16   Ht 5' 9\" (1.753 m)   Wt 257 lb 8 oz (116.8 kg)   SpO2 94%   BMI 38.03 kg/m²     General:  alert, appears stated age, cooperative and morbidly obese   Skin:  normal   Eyes: conjunctivae/corneas clear. PERRL, EOM's intact. Fundi benign. Mouth: MMM no lesions   Lymph Nodes:  Cervical, supraclavicular, and axillary nodes normal.   Lungs:  clear to auscultation bilaterally   Heart:  regular rate and rhythm, S1, S2 normal, no murmur, click, rub or gallop   Abdomen: soft, non-tender; bowel sounds normal; no masses,  no organomegaly; large pannus and abdominal girth   CVA:  absent   Genitourinary: defer exam   Extremities:  Cool bilateral feet; dry eshars B ankles at level of sock compression. Superficial wounds tips of R toes    Pulses:   R bruit  L bruit   2   carotid 2    2   brachial 2    2   radial 2    0   femoral 2    0   popliteal 0    0   posterior tibial 0    0   dorsalis pedis 0    na   bypass graft na       Neurologic:  Motor 4/5 B hip flexors and knee flexors   Psychiatric:  non focal       BLE arterial duplex scan 12/30/2020 - personally reviewed  Summary        Absent right DESTINEY suggestive of significant ischemia.    Normal left DESTINEY.        Proximal right leg arterial waveforms are suggestive of significant inflow    disease localized to the iliac arteries without significant right    femoropopliteal disease. Anterior tibial artery is patent as the single    runoff vessel to the right foot.        No evidence of significant infrainguinal arterial disease of the L leg.           Assessment:     1) Weakness BLE - not characteristic of arterial ischemia  2) Inflow arterial disease R leg - possible R iliac stent occlusion - no signs of limb threatening ischemia   3) BLE wounds - perfusion R leg likely inadequate for healing at this time  4) ESRD on HD  5) DM  6) H/O tobacco abuse     Rec:      Observation at this time.   No plans for intervention at this time since no limb threatening ischemia. CTA abd with runoff. Will decide on heparin drip continuation once CT reviewed. May need some form of revascularization for wound healing but not for lower extremity complaints. Will follow.     Nora Hedrick

## 2021-01-01 NOTE — DISCHARGE SUMMARY
Constipation - likely due to immobility and narcotic use. Resolved. Medications:  Reviewed    Infusion Medications   Scheduled Medications     PRN Meds:     No intake or output data in the 24 hours ending 01/01/21 0913    Physical Exam Performed:    /65   Pulse 91   Temp 98.6 °F (37 °C) (Oral)   Resp 16   Ht 5' 9\" (1.753 m)   Wt 261 lb 7.5 oz (118.6 kg)   SpO2 98%   BMI 38.61 kg/m²     General appearance: No apparent distress, appears stated age and cooperative. HEENT: Pupils equal, round, and reactive to light. Conjunctivae/corneas clear. Neck: Supple, with full range of motion. No jugular venous distention. Trachea midline. Respiratory:  Normal respiratory effort. Clear to auscultation, bilaterally without Rales/Wheezes/Rhonchi. Cardiovascular: Regular rate and rhythm with normal S1/S2 without murmurs, rubs or gallops. Abdomen: Soft, non-tender, non-distended with normal bowel sounds. Musculoskeletal: No clubbing, cyanosis or edema bilaterally. Full range of motion without deformity. Skin: Skin color, texture, turgor normal.  No rashes or lesions. Neurologic:  Abnormal sensation below the knee bilaterally. 3/5 weakness BLE. Psychiatric: Alert and oriented, thought content appropriate, normal insight  Capillary Refill: Brisk,< 3 seconds   Peripheral Pulses: +2 palpable, equal bilaterally       Labs:   Recent Labs     12/30/20  1005 12/31/20  0745   WBC 12.1* 8.8   HGB 9.8* 10.3*   HCT 30.7* 31.1*    245     Recent Labs     12/30/20  0847 12/30/20  1005 12/31/20  0745   *  --  131*   K 6.8* 6.5* 4.7   CL 92*  --  92*   CO2 27  --  27   BUN 70*  --  45*   CREATININE 5.9*  --  5.5*   CALCIUM 9.4  --  9.0     No results for input(s): AST, ALT, BILIDIR, BILITOT, ALKPHOS in the last 72 hours. No results for input(s): INR in the last 72 hours. No results for input(s): Sarah Baseman in the last 72 hours.     Urinalysis:      Lab Results   Component Value Date    NITRU Negative 06/05/2020    WBCUA 10-20 06/05/2020    BACTERIA 2+ 06/05/2020    RBCUA 0-2 06/05/2020    BLOODU TRACE-INTACT 06/05/2020    SPECGRAV 1.020 06/05/2020    GLUCOSEU 250 06/05/2020       Radiology:  IR LUMBAR PUNCTURE FOR DIAGNOSIS   Final Result   Successful fluoroscopic-guided lumbar puncture. CT ABDOMEN PELVIS W IV CONTRAST Additional Contrast? Radiologist Recommendation   Final Result   No acute abdominopelvic abnormality. MRI THORACIC SPINE WO CONTRAST   Final Result   No acute thoracic spine abnormality. No thoracic spinal canal or neural   foraminal stenosis. MRI CERVICAL SPINE WO CONTRAST   Final Result   No acute cervical spine abnormality. Mild degenerative spondylosis without significant cervical spinal canal   stenosis or high-grade neural foraminal stenosis. Mild left C4-C5 and right C6-C7 neural foraminal stenosis. MRI LUMBAR SPINE WO CONTRAST   Final Result   1. Mild L1-2 and L5-S1 degenerative disc height loss. 2. Moderate right L5 neural foraminal narrowing secondary to a right   foraminal L5-S1 disc protrusion. 3. Mild L1-2 spinal canal stenosis. CT ABDOMEN PELVIS WO CONTRAST   Final Result   1. No CT evidence of an acute intra-abdominal or intrapelvic process. 2.  No findings to suggest acute appendicitis; no ureter calculus or   hydronephrosis. 3. Probably reactive lymph nodes in the right upper quadrant, unchanged from   prior study. 4.  Mild extrahepatic bile duct dilatation status post cholecystectomy   typical of reservoir effect. 5.  Calcific atherosclerotic disease aorta. 6. Small, fat containing umbilical hernia. 7. Benign left adrenal adenoma requires no additional evaluation or follow-up.                  Assessment/Plan:    Active Hospital Problems    Diagnosis    S/P TAVR (transcatheter aortic valve replacement) [Z95.2]     Priority: Medium    Sinus pause [I45.5]    GBS (Guillain-Trenton syndrome) ([facility]) [G61.0]    Bilateral leg weakness [R29.898]    ESRD (end stage renal disease) on dialysis ([facility]) [N18.6, Z99.2]    DM (diabetes mellitus), secondary, uncontrolled, w/neurologic complic ([facility]) [W30.84, H80.20]    Nonischemic cardiomyopathy ([facility] [address] [I42.8]       Dispo: Home with PT/OT, declined SNF  Diet:Renal  Code Status: Full Code  Condition: Stable.   PT/OT Eval Status:home         JAY Osuna - CNP

## 2021-01-01 NOTE — PROGRESS NOTES
Hospitalist Progress Note      PCP: Suzan Parra MD    Date of Admission: 12/30/2020      Subjective:   Going for CT of the abdomen with runoff to assess lower extremity vessels. .. No new issues. ..  Still with some weakness and numbness in the legs    Medications:  Reviewed    Infusion Medications    dextrose       Scheduled Medications    pregabalin  25 mg Oral Once    aspirin  81 mg Oral Daily    Calcium Acetate (Phos Binder)  1 capsule Oral TID WC    citalopram  40 mg Oral Daily    clopidogrel  75 mg Oral Daily    DULoxetine  30 mg Oral Daily    fluticasone  1 spray Each Nostril BID    hydrALAZINE  25 mg Oral 3 times per day    insulin glargine  70 Units Subcutaneous Nightly    insulin lispro  20 Units Subcutaneous TID WC    isosorbide mononitrate  30 mg Oral Daily    losartan  50 mg Oral Daily    pravastatin  80 mg Oral Nightly    pantoprazole  40 mg Oral QAM AC    QUEtiapine  50 mg Oral QPM    glycopyrrolate-formoterol  2 puff Inhalation BID    torsemide  100 mg Oral Daily    traZODone  150 mg Oral Nightly    tiZANidine  4 mg Oral TID    pregabalin  25 mg Oral TID    linaclotide  145 mcg Oral QAM AC    b complex-C-folic acid  1 capsule Oral Daily    sodium chloride flush  10 mL Intravenous 2 times per day    insulin lispro  0-12 Units Subcutaneous TID WC    insulin lispro  0-6 Units Subcutaneous Nightly    insulin regular  10 Units Intravenous Once    dextrose  25 g Intravenous Once    calcium gluconate-NaCl  1 g Intravenous Once    heparin (porcine)  4,100 Units Intravenous Q MWF    doxercalciferol  3.5 mcg Intravenous Q MWF    heparin (porcine)  5,000 Units Subcutaneous 3 times per day     PRN Meds: albuterol, hydrOXYzine, oxyCODONE-acetaminophen, calcium carbonate, sodium chloride flush, promethazine **OR** ondansetron, polyethylene glycol, acetaminophen **OR** acetaminophen, glucose, dextrose, glucagon (rDNA), dextrose, heparin (porcine)      Intake/Output Summary (Last 24 hours) at 1/1/2021 1319  Last data filed at 1/1/2021 1118  Gross per 24 hour   Intake 240 ml   Output --   Net 240 ml       Exam:    BP (!) 106/53   Pulse 81   Temp 97.7 °F (36.5 °C) (Oral)   Resp 18   Ht 5' 9\" (1.753 m)   Wt 257 lb 8 oz (116.8 kg)   SpO2 98%   BMI 38.03 kg/m²     General appearance: No apparent distress, appears stated age and cooperative. HEENT: Pupils equal, round, and reactive to light. Conjunctivae/corneas clear. Neck: Supple, with full range of motion. No jugular venous distention. Trachea midline. Respiratory:  Normal respiratory effort. Clear to auscultation, bilaterally without Rales/Wheezes/Rhonchi. Cardiovascular: Regular rate and rhythm with normal S1/S2 without murmurs, rubs or gallops. Abdomen: Soft, non-tender, non-distended with normal bowel sounds. Musculoskeletal: No clubbing, cyanosis or edema bilaterally. Neurologic:  Cranial nerves: II-XII intact,   Psychiatric: Alert and oriented, thought content appropriate, normal insight  Capillary Refill: Brisk,< 3 seconds   Peripheral Pulses: +2 palpable, equal bilaterally       Labs:   Recent Labs     12/30/20  1005 12/31/20  0745 01/01/21  1112   WBC 12.1* 8.8 6.2   HGB 9.8* 10.3* 9.1*   HCT 30.7* 31.1* 28.1*    245 218     Recent Labs     12/30/20  0847 12/30/20  1005 12/31/20  0745 01/01/21  1112   *  --  131* 130*   K 6.8* 6.5* 4.7 4.5   CL 92*  --  92* 90*   CO2 27  --  27 28   BUN 70*  --  45* 57*   CREATININE 5.9*  --  5.5* 6.0*   CALCIUM 9.4  --  9.0 8.8     No results for input(s): AST, ALT, BILIDIR, BILITOT, ALKPHOS in the last 72 hours. No results for input(s): INR in the last 72 hours. No results for input(s): Tenna Pinta in the last 72 hours. Assessment/Plan:    Severe polyneuropathy--attributed to diabetic neuropathy . Recent thoracic, cervical and lumbar MRIs showed only moderate right L5 narrowing with L5 S1 disc protrusion.  Recent work-up ruled out demyelinating disease/GBS      -Recurrent falls with weakness/ambulatory dysfunction due to above-        -Junctional bradycardia--heart rate in the 40s--improved-Coreg held        -ESRD on hemodialysis MWF     -Severe hyperkalemia-potassium 6.8--- improved with dialysis     -Hyponatremia-sodium 130-stable     -Obstructive sleep apnea-on CPAP at bedtime     -Diabetes mellitus type 2, uncontrolled-insulin-dependent     -Peripheral vascular disease status post bilateral stents--diminished pulses currently     -Nonischemic cardiomyopathy/chronic systolic heart failure, EF 43%     -History of TAVR for aortic stenosis     -Asthma/COPD -stable on inhaled steroids and bronchodilators     PLAN:     -Continue PT and OT  -CT of the abdomen with runoff ordered by vascular surgeon concern for possible stent occlusion of the right leg  -Continue current treatment--hemodialysis tomorrow  -Discharge planning to SNF--needs precept     DVT Prophylaxis: Lovenox  Diet: DIET RENAL; Carb Control: 4 carb choices (60 gms)/meal  Code Status: Full Code           Tamiko Guzman MD

## 2021-01-01 NOTE — PROGRESS NOTES
Patient off floor getting CTA when came by for visit. Bradycardia appears to have resolved. Will re-evaluate tomorrow.     David Gutierrez, [address]

## 2021-01-02 PROBLEM — I73.9 PERIPHERAL VASCULAR OCCLUSIVE DISEASE (HCC): Status: ACTIVE | Noted: 2021-01-02

## 2021-01-02 LAB
ANION GAP SERPL CALCULATED.3IONS-SCNC: 14 MMOL/L (ref 3–16)
BUN BLDV-MCNC: 63 MG/DL (ref 7–20)
CALCIUM SERPL-MCNC: 8.7 MG/DL (ref 8.3–10.6)
CHLORIDE BLD-SCNC: 90 MMOL/L (ref 99–110)
CO2: 27 MMOL/L (ref 21–32)
CREAT SERPL-MCNC: 6.6 MG/DL (ref 0.9–1.3)
GFR AFRICAN AMERICAN: 11
GFR NON-AFRICAN AMERICAN: 9
GLUCOSE BLD-MCNC: 143 MG/DL (ref 70–99)
GLUCOSE BLD-MCNC: 160 MG/DL (ref 70–99)
GLUCOSE BLD-MCNC: 175 MG/DL (ref 70–99)
GLUCOSE BLD-MCNC: 243 MG/DL (ref 70–99)
GLUCOSE BLD-MCNC: 98 MG/DL (ref 70–99)
HCT VFR BLD CALC: 27.1 % (ref 40.5–52.5)
HEMOGLOBIN: 9 G/DL (ref 13.5–17.5)
MCH RBC QN AUTO: 29.6 PG (ref 26–34)
MCHC RBC AUTO-ENTMCNC: 33.3 G/DL (ref 31–36)
MCV RBC AUTO: 89 FL (ref 80–100)
PDW BLD-RTO: 14.2 % (ref 12.4–15.4)
PERFORMED ON: ABNORMAL
PERFORMED ON: NORMAL
PLATELET # BLD: 250 K/UL (ref 135–450)
PMV BLD AUTO: 8.4 FL (ref 5–10.5)
POTASSIUM SERPL-SCNC: 4.3 MMOL/L (ref 3.5–5.1)
RBC # BLD: 3.05 M/UL (ref 4.2–5.9)
SODIUM BLD-SCNC: 131 MMOL/L (ref 136–145)
WBC # BLD: 6 K/UL (ref 4–11)

## 2021-01-02 PROCEDURE — 94761 N-INVAS EAR/PLS OXIMETRY MLT: CPT

## 2021-01-02 PROCEDURE — G0378 HOSPITAL OBSERVATION PER HR: HCPCS

## 2021-01-02 PROCEDURE — 1200000000 HC SEMI PRIVATE

## 2021-01-02 PROCEDURE — 6360000002 HC RX W HCPCS: Performed by: INTERNAL MEDICINE

## 2021-01-02 PROCEDURE — 80048 BASIC METABOLIC PNL TOTAL CA: CPT

## 2021-01-02 PROCEDURE — 6360000002 HC RX W HCPCS: Performed by: HOSPITALIST

## 2021-01-02 PROCEDURE — 85027 COMPLETE CBC AUTOMATED: CPT

## 2021-01-02 PROCEDURE — 94660 CPAP INITIATION&MGMT: CPT

## 2021-01-02 PROCEDURE — 90935 HEMODIALYSIS ONE EVALUATION: CPT

## 2021-01-02 PROCEDURE — 94640 AIRWAY INHALATION TREATMENT: CPT

## 2021-01-02 PROCEDURE — 2500000003 HC RX 250 WO HCPCS: Performed by: HOSPITALIST

## 2021-01-02 PROCEDURE — 2580000003 HC RX 258: Performed by: HOSPITALIST

## 2021-01-02 PROCEDURE — 6370000000 HC RX 637 (ALT 250 FOR IP): Performed by: HOSPITALIST

## 2021-01-02 PROCEDURE — 99232 SBSQ HOSP IP/OBS MODERATE 35: CPT | Performed by: SURGERY

## 2021-01-02 PROCEDURE — 2700000000 HC OXYGEN THERAPY PER DAY

## 2021-01-02 PROCEDURE — 36415 COLL VENOUS BLD VENIPUNCTURE: CPT

## 2021-01-02 RX ADMIN — HEPARIN SODIUM 5000 UNITS: 5000 INJECTION INTRAVENOUS; SUBCUTANEOUS at 06:32

## 2021-01-02 RX ADMIN — GLYCOPYRROLATE AND FORMOTEROL FUMARATE 2 PUFF: 9; 4.8 AEROSOL, METERED RESPIRATORY (INHALATION) at 20:30

## 2021-01-02 RX ADMIN — HYDRALAZINE HYDROCHLORIDE 25 MG: 25 TABLET, FILM COATED ORAL at 06:33

## 2021-01-02 RX ADMIN — DOXERCALCIFEROL 3.5 MCG: 2 INJECTION, SOLUTION INTRAVENOUS at 11:53

## 2021-01-02 RX ADMIN — PREGABALIN 25 MG: 25 CAPSULE ORAL at 14:50

## 2021-01-02 RX ADMIN — DULOXETINE HYDROCHLORIDE 30 MG: 30 CAPSULE, DELAYED RELEASE ORAL at 12:26

## 2021-01-02 RX ADMIN — PREGABALIN 25 MG: 25 CAPSULE ORAL at 20:21

## 2021-01-02 RX ADMIN — CITALOPRAM HYDROBROMIDE 40 MG: 20 TABLET ORAL at 12:26

## 2021-01-02 RX ADMIN — LOSARTAN POTASSIUM 50 MG: 25 TABLET, FILM COATED ORAL at 12:26

## 2021-01-02 RX ADMIN — INSULIN GLARGINE 70 UNITS: 100 INJECTION, SOLUTION SUBCUTANEOUS at 20:20

## 2021-01-02 RX ADMIN — INSULIN LISPRO 2 UNITS: 100 INJECTION, SOLUTION INTRAVENOUS; SUBCUTANEOUS at 18:05

## 2021-01-02 RX ADMIN — TRAZODONE HYDROCHLORIDE 150 MG: 50 TABLET ORAL at 20:21

## 2021-01-02 RX ADMIN — INSULIN LISPRO 2 UNITS: 100 INJECTION, SOLUTION INTRAVENOUS; SUBCUTANEOUS at 20:21

## 2021-01-02 RX ADMIN — HYDRALAZINE HYDROCHLORIDE 25 MG: 25 TABLET, FILM COATED ORAL at 14:50

## 2021-01-02 RX ADMIN — Medication 10 ML: at 20:22

## 2021-01-02 RX ADMIN — CALCIUM ACETATE 667 MG: 667 CAPSULE ORAL at 18:04

## 2021-01-02 RX ADMIN — OXYCODONE HYDROCHLORIDE AND ACETAMINOPHEN 1 TABLET: 7.5; 325 TABLET ORAL at 12:30

## 2021-01-02 RX ADMIN — PRAVASTATIN SODIUM 80 MG: 80 TABLET ORAL at 20:21

## 2021-01-02 RX ADMIN — TORSEMIDE 100 MG: 100 TABLET ORAL at 12:27

## 2021-01-02 RX ADMIN — NEPHROCAP 1 MG: 1 CAP ORAL at 12:27

## 2021-01-02 RX ADMIN — ASPIRIN 81 MG 81 MG: 81 TABLET ORAL at 12:27

## 2021-01-02 RX ADMIN — TIZANIDINE 4 MG: 4 TABLET ORAL at 20:21

## 2021-01-02 RX ADMIN — QUETIAPINE FUMARATE 50 MG: 25 TABLET ORAL at 20:21

## 2021-01-02 RX ADMIN — PANTOPRAZOLE SODIUM 40 MG: 40 TABLET, DELAYED RELEASE ORAL at 06:33

## 2021-01-02 RX ADMIN — CLOPIDOGREL BISULFATE 75 MG: 75 TABLET, FILM COATED ORAL at 12:26

## 2021-01-02 RX ADMIN — ISOSORBIDE MONONITRATE 30 MG: 30 TABLET, EXTENDED RELEASE ORAL at 12:26

## 2021-01-02 RX ADMIN — TIZANIDINE 4 MG: 4 TABLET ORAL at 14:50

## 2021-01-02 RX ADMIN — OXYCODONE HYDROCHLORIDE AND ACETAMINOPHEN 1 TABLET: 7.5; 325 TABLET ORAL at 20:19

## 2021-01-02 RX ADMIN — CALCIUM ACETATE 667 MG: 667 CAPSULE ORAL at 12:26

## 2021-01-02 ASSESSMENT — PAIN SCALES - GENERAL
PAINLEVEL_OUTOF10: 8
PAINLEVEL_OUTOF10: 0

## 2021-01-02 ASSESSMENT — PAIN DESCRIPTION - PAIN TYPE: TYPE: CHRONIC PAIN

## 2021-01-02 NOTE — PROGRESS NOTES
Physical Therapy    Attempted session today both a.m. and p.m. but pt off floor for dialysis. Will con't to follow at later date.    [facility], JYL#[id number]

## 2021-01-02 NOTE — PROGRESS NOTES
Progress Note    HISTORY     CC:  Fall and weakness           We are following for ESRD       Subjective/   HPI:  Seen on dialysis. Tolerating well. Catheter is working. Good blood flow. 3 kg UF and no cramping. No new complaints. ROS:  Constitutional:  No fevers, No Chills, + weakness  Cardiovascular:  No palpations  Respiratory:  No wheezing, no cough  Skin:  No rash, no itching    Social Hx:  No family at bedside     Past Medical and Surgical History:  - Reviewed, no changes     EXAM       Objective/     Vitals:    01/02/21 0630 01/02/21 0742 01/02/21 0821 01/02/21 1221   BP: (!) 164/70 103/63 125/70 (!) 146/87   Pulse: 73 76 75 86   Resp: 16 16 20 18   Temp: 98.6 °F (37 °C) 98.3 °F (36.8 °C) 97.6 °F (36.4 °C) 97.8 °F (36.6 °C)   TempSrc: Oral Oral  Oral   SpO2: 96% 94%  98%   Weight:   264 lb 1.8 oz (119.8 kg)    Height:         24HR INTAKE/OUTPUT:      Intake/Output Summary (Last 24 hours) at 1/2/2021 1239  Last data filed at 1/1/2021 1549  Gross per 24 hour   Intake 240 ml   Output 600 ml   Net -360 ml     Constitutional:  NAD  Eyes:  Pupils reactive, sclera clear   Neck:  Normal thyroid, no masses   Cardiovascular:  Regular, no rub  Respiratory:  No distress, no wheezing  Psychiatry:  Flat mood/affect, alert  Abdomen: +bs, soft, nt, no masses   Musculoskeletal: No LE edema, no clubbing   Lymphatics:  No LAD in neck, no supraclavicular nodes   Access:  [facility]      MEDICAL DECISION MAKING       Data/  Recent Labs     12/31/20  0745 01/01/21  1112 01/02/21  0815   WBC 8.8 6.2 6.0   HGB 10.3* 9.1* 9.0*   HCT 31.1* 28.1* 27.1*   MCV 90.0 90.7 89.0    218 250     Recent Labs     12/31/20  0745 01/01/21  1112 01/02/21  0815   * 130* 131*   K 4.7 4.5 4.3   CL 92* 90* 90*   CO2 27 28 27   GLUCOSE 132* 146* 143*   BUN 45* 57* 63*   CREATININE 5.5* 6.0* 6.6*   LABGLOM 11* 10* 9*   GFRAA 13* 12* 11*       Assessment/     ESRD:  On dialysis with a left TDC.   Had a fistula but developed vascular access steal and failed PD. Has been declined for kidney transplant due to medical conditions      Weakness:  Seen by neurology at last admission. Planning for placement at nursing home but then went home after some improvement. Had MRI and LP for LE numbness. Diagnosed with neuropathy     Anemia:  Drifting below target,      LE pressure ulcers:  Wound care following. Vascular surgery following.       Dizziness/weakness/fall:  - Worse with hyperkalemia. Has improved but legs remain week     Hyperkalemia:  Due to ESRD and diet.   Resolved with dialysis     Plan/     HD on Sat and Monday for holiday schedule unless urgent need develops  Follow labs  Aranesp with next dialysis   -----------------------------  Robyn Oseugera M.D.   [facility]

## 2021-01-02 NOTE — PROGRESS NOTES
VASCULAR     No new events or symptoms.     VSS     afeb  Exam unchanged - wounds dry, intact without redness or purulence     CTA 1/12020 personally reviewed - agree with rad interpretation  Impression   Vascular-       Aortoiliac inflow disease.  On the right, the iliac stents are distally   occluded.  On the left there is moderate stenosis the proximal common iliac   artery with patent distal stents.       Femoropopliteal disease, multifocal bilaterally.  On the right there are   multiple mild-to-moderate stenoses and severe stenosis adductor canal and   very distal popliteal.  On the left, there are multiple mild-to-moderate   stenoses.  There appears to be a focal moderate stenosis in the mid SFA,   immediately proximal to the overlapping stents.       Trifurcation disease, multifocal bilaterally.  On the right, there is primary   runoff.  The posterior tibial.  On the left, there is three-vessel runoff,   although there appear to be focal high-grade stenoses in the proximal   anterior and posterior tibial arteries.       Splenic 2 x 1 cm hypodensity, variant enhancement versus embolic infarction. This appears new since 12/07/2020.       No acute inflammatory abnormalities identified in the abdomen or pelvis.        A/P:     Leg weakness - not vascular by history and exam              R foot wounds with inflow disease localized to EIA plus infrainguinal disease              Can DC heparin drip and follow. Dr. Jorge Luis Iniguez to see on Monday and decide on further vascular steps in management.      Lenny Richards

## 2021-01-02 NOTE — PROGRESS NOTES
Patient rhythm: sinus rhythm, no more episodes of sinus bradycardia. Will sign off, please call with any questions.

## 2021-01-02 NOTE — PROGRESS NOTES
01/02/21 0320   NIV Type   $NIV $Daily Charge   NIV Started/Stopped On   Equipment Type v60   Mode CPAP   Mask Type Full face mask   Mask Size Large   Settings/Measurements   CPAP/EPAP 5 cmH2O   Resp 16   FiO2  25 %   Vt Exhaled 525 mL   Mask Leak (lpm) 30 lpm   Comfort Level Good   Breath Sounds   Right Upper Lobe Diminished   Right Middle Lobe Diminished   Right Lower Lobe Diminished   Left Upper Lobe Diminished   Left Lower Lobe Diminished   Alarm Settings   Alarms On Y   Press Low Alarm 4 cmH2O   High Pressure Alarm 25 cmH2O   Resp Rate Low Alarm 6   High Respiratory Rate 40 br/min

## 2021-01-02 NOTE — FLOWSHEET NOTE
01/02/21 0821 01/02/21 1154   Treatment   Time On 0821  --    Time Off  --  1154   Treatment Goal 3000  --    Observations & Evaluations   Level of Consciousness Alert (0) Alert (0)   Oriented X 3 3   Vital Signs   /70 (!) 162/71   Temp 97.6 °F (36.4 °C) 97.6 °F (36.4 °C)   Pulse 75 84   Resp 20 20   Weight 264 lb 1.8 oz (119.8 kg) 256 lb 6.3 oz (116.3 kg)   Weight Method Bed scale Bed scale   Percent Weight Change 2.57 -2.92   Dry Weight 257 lb 15 oz (117 kg)  --    Pain Assessment   Pain Assessment 0-10 0-10   Pain Level 0 0   Technical Checks   RO Machine Log Sheet Completed Yes  --    Machine Alarm Self Test Completed  --    Machine Autotest Completed  --    Air Foam Detector Tested  --    Extracorporeal Circuit Tested for Integrity Yes  --    Treatment Initiation   Dialyze Hours 3.5  --    Treatment  Initiation Universal Precautions maintained;Lines secured to patient; Connections secured;Prime given;Venous Parameters set; Arterial Parameters set; Air foam detector engaged; Hemosafe Device; Saline line double clamped; Hemo-Safe Applied;Dialyzer;F180  --    During Hemodialysis Assessment   Blood Flow Rate (ml/min) 400 ml/min  --    Ultrafiltration Rate (ml/hr) 970 ml/hr  --    Arterial Pressure (mmHg) -220 mmHg  --    Venous Pressure (mmHg) 150  --    TMP 60  --      --    Access Visible Yes  --    Dialysis Bath   K+ (Potassium) 3  --    Ca+ (Calcium) 2.5  --    Na+ (Sodium) 138  --    HCO3 (Bicarb) 32  --    Hemodialysis Central Access Internal jugular Left Neck   Placement Date/Time: 10/14/19 0905   Pre-existing: Yes  Timeout: Patient;Procedure;Site/Side;Consent Confirmed; Appropriate Equipment;Sterile Technique using full body drape;Site prepped with chlorhexidine;Sterile drsg with biopatch; Correct Position  I... Site Assessment Clean;Dry; Intact Clean;Dry; Intact   Access Interventions Aseptic Technique Aseptic Technique   Dressing Intervention Dressing reinforced Dressing reinforced   Dressing

## 2021-01-02 NOTE — PROGRESS NOTES
01/01/21 8710   NIV Type   $NIV $Daily Charge   NIV Started/Stopped On   Equipment Type v60   Mode CPAP   Mask Type Full face mask   Mask Size Large   Settings/Measurements   CPAP/EPAP 5 cmH2O   Resp 17   FiO2  35 %   Vt Exhaled 499 mL   Mask Leak (lpm) 22 lpm   Comfort Level Good   Using Accessory Muscles No   SpO2 98   Breath Sounds   Right Upper Lobe Diminished   Right Middle Lobe Diminished   Right Lower Lobe Diminished   Left Upper Lobe Diminished   Left Lower Lobe Diminished   Patient Observation   Observations refused mepilex   Alarm Settings   Alarms On Y   Press Low Alarm 4 cmH2O   High Pressure Alarm 25 cmH2O   Resp Rate Low Alarm 6   High Respiratory Rate 40 br/min

## 2021-01-02 NOTE — PROGRESS NOTES
Hospitalist Progress Note      PCP: Suzan Parra MD    Date of Admission: 12/30/2020      Subjective:     Seen at dialysis this morning. No new issues. Main concern is numbness and some weakness.   Was alert ambulate with PT and OT yesterday    Medications:  Reviewed    Infusion Medications    dextrose       Scheduled Medications    aspirin  81 mg Oral Daily    Calcium Acetate (Phos Binder)  1 capsule Oral TID WC    citalopram  40 mg Oral Daily    clopidogrel  75 mg Oral Daily    DULoxetine  30 mg Oral Daily    fluticasone  1 spray Each Nostril BID    hydrALAZINE  25 mg Oral 3 times per day    insulin glargine  70 Units Subcutaneous Nightly    insulin lispro  20 Units Subcutaneous TID WC    isosorbide mononitrate  30 mg Oral Daily    losartan  50 mg Oral Daily    pravastatin  80 mg Oral Nightly    pantoprazole  40 mg Oral QAM AC    QUEtiapine  50 mg Oral QPM    glycopyrrolate-formoterol  2 puff Inhalation BID    torsemide  100 mg Oral Daily    traZODone  150 mg Oral Nightly    tiZANidine  4 mg Oral TID    pregabalin  25 mg Oral TID    linaclotide  145 mcg Oral QAM AC    b complex-C-folic acid  1 capsule Oral Daily    sodium chloride flush  10 mL Intravenous 2 times per day    insulin lispro  0-12 Units Subcutaneous TID WC    insulin lispro  0-6 Units Subcutaneous Nightly    insulin regular  10 Units Intravenous Once    dextrose  25 g Intravenous Once    calcium gluconate-NaCl  1 g Intravenous Once    heparin (porcine)  4,100 Units Intravenous Q MWF    doxercalciferol  3.5 mcg Intravenous Q MWF    heparin (porcine)  5,000 Units Subcutaneous 3 times per day     PRN Meds: albuterol, hydrOXYzine, oxyCODONE-acetaminophen, calcium carbonate, sodium chloride flush, promethazine **OR** ondansetron, polyethylene glycol, acetaminophen **OR** acetaminophen, glucose, dextrose, glucagon (rDNA), dextrose, heparin (porcine)      Intake/Output Summary (Last 24 hours) at 1/2/2021 3769  Last data filed at 1/1/2021 1549  Gross per 24 hour   Intake 240 ml   Output 600 ml   Net -360 ml       Exam:    /63   Pulse 76   Temp 98.3 °F (36.8 °C) (Oral)   Resp 16   Ht 5' 9\" (1.753 m)   Wt 257 lb 8 oz (116.8 kg)   SpO2 94%   BMI 38.03 kg/m²     General appearance: No apparent distress, appears stated age and cooperative. HEENT: Pupils equal, round, and reactive to light. Conjunctivae/corneas clear. Neck: Supple, with full range of motion. No jugular venous distention. Trachea midline. Respiratory:  Normal respiratory effort. Clear to auscultation, bilaterally without Rales/Wheezes/Rhonchi. Cardiovascular: Regular rate and rhythm with normal S1/S2 without murmurs, rubs or gallops. Abdomen: Soft, non-tender, non-distended with normal bowel sounds. Musculoskeletal: No clubbing, cyanosis or edema bilaterally. Neurologic:  Cranial nerves: II-XII intact,   Psychiatric: Alert and oriented, thought content appropriate, normal insight  Capillary Refill: Brisk,< 3 seconds   Peripheral Pulses: +2 palpable, equal bilaterally       Labs:   Recent Labs     12/30/20  1005 12/31/20  0745 01/01/21  1112   WBC 12.1* 8.8 6.2   HGB 9.8* 10.3* 9.1*   HCT 30.7* 31.1* 28.1*    245 218     Recent Labs     12/30/20  0847 12/30/20  1005 12/31/20  0745 01/01/21  1112   *  --  131* 130*   K 6.8* 6.5* 4.7 4.5   CL 92*  --  92* 90*   CO2 27  --  27 28   BUN 70*  --  45* 57*   CREATININE 5.9*  --  5.5* 6.0*   CALCIUM 9.4  --  9.0 8.8     No results for input(s): AST, ALT, BILIDIR, BILITOT, ALKPHOS in the last 72 hours. No results for input(s): INR in the last 72 hours. No results for input(s): Dolores Jennifer in the last 72 hours. Assessment/Plan:    -Severe polyneuropathy--attributed to diabetic neuropathy . Recent thoracic, cervical and lumbar MRIs showed only moderate right L5 narrowing with L5 S1 disc protrusion.  Recent work-up ruled out demyelinating disease/GBS      -Recurrent falls with weakness/ambulatory dysfunction due to above-        -Junctional bradycardia--heart rate in the 40s--resolved -Coreg held-seen by EP        -ESRD on hemodialysis MWF--follow with nephrology     -Severe hyperkalemia-potassium 6.8--- resolved with dialysis     -Hyponatremia-sodium 130-stable     -Obstructive sleep apnea-on CPAP at bedtime     -Diabetes mellitus type 2, uncontrolled-insulin-dependent     -Peripheral vascular disease status post bilateral stents--CTA/arterial Dopplers reviewed--follow-up with vascular surgery     -Nonischemic cardiomyopathy/chronic systolic heart failure, EF 43%     -History of TAVR for aortic stenosis     -Asthma/COPD -stable on inhaled steroids and bronchodilators    -2 x 1 cm splenic hypodensity on CT of the abdomen--- Per radiologist could be a variant enhancement versus embolic infarction     PLAN:     -Hemodialysis today  -Continue PT and OT  -Continue current treatment  -Obtain echocardiogram to rule out endocarditis/cardiac thrombus-given questionable splenic infarct on CT  -Discharge planning to SNF--needs precert     DVT Prophylaxis: Lovenox  Diet: DIET RENAL; Carb Control: 4 carb choices (60 gms)/meal  Code Status: Full Code           Kervin Cooper MD

## 2021-01-03 LAB
GLUCOSE BLD-MCNC: 131 MG/DL (ref 70–99)
GLUCOSE BLD-MCNC: 162 MG/DL (ref 70–99)
GLUCOSE BLD-MCNC: 187 MG/DL (ref 70–99)
GLUCOSE BLD-MCNC: 224 MG/DL (ref 70–99)
PERFORMED ON: ABNORMAL

## 2021-01-03 PROCEDURE — 6370000000 HC RX 637 (ALT 250 FOR IP): Performed by: HOSPITALIST

## 2021-01-03 PROCEDURE — 2500000003 HC RX 250 WO HCPCS: Performed by: HOSPITALIST

## 2021-01-03 PROCEDURE — 2580000003 HC RX 258: Performed by: HOSPITALIST

## 2021-01-03 PROCEDURE — 1200000000 HC SEMI PRIVATE

## 2021-01-03 PROCEDURE — 2700000000 HC OXYGEN THERAPY PER DAY

## 2021-01-03 PROCEDURE — 6360000002 HC RX W HCPCS: Performed by: HOSPITALIST

## 2021-01-03 PROCEDURE — 6370000000 HC RX 637 (ALT 250 FOR IP): Performed by: NURSE PRACTITIONER

## 2021-01-03 PROCEDURE — 94761 N-INVAS EAR/PLS OXIMETRY MLT: CPT

## 2021-01-03 PROCEDURE — 94640 AIRWAY INHALATION TREATMENT: CPT

## 2021-01-03 PROCEDURE — 94660 CPAP INITIATION&MGMT: CPT

## 2021-01-03 RX ORDER — DOCUSATE SODIUM 100 MG/1
100 CAPSULE, LIQUID FILLED ORAL DAILY
Status: DISCONTINUED | OUTPATIENT
Start: 2021-01-03 | End: 2021-01-05 | Stop reason: HOSPADM

## 2021-01-03 RX ORDER — HEPARIN SODIUM 5000 [USP'U]/ML
5000 INJECTION, SOLUTION INTRAVENOUS; SUBCUTANEOUS EVERY 8 HOURS SCHEDULED
Status: DISCONTINUED | OUTPATIENT
Start: 2021-01-03 | End: 2021-01-05 | Stop reason: HOSPADM

## 2021-01-03 RX ADMIN — CLOPIDOGREL BISULFATE 75 MG: 75 TABLET, FILM COATED ORAL at 09:52

## 2021-01-03 RX ADMIN — LOSARTAN POTASSIUM 50 MG: 25 TABLET, FILM COATED ORAL at 09:52

## 2021-01-03 RX ADMIN — INSULIN LISPRO 4 UNITS: 100 INJECTION, SOLUTION INTRAVENOUS; SUBCUTANEOUS at 16:46

## 2021-01-03 RX ADMIN — INSULIN LISPRO 2 UNITS: 100 INJECTION, SOLUTION INTRAVENOUS; SUBCUTANEOUS at 09:57

## 2021-01-03 RX ADMIN — INSULIN LISPRO 2 UNITS: 100 INJECTION, SOLUTION INTRAVENOUS; SUBCUTANEOUS at 12:24

## 2021-01-03 RX ADMIN — HEPARIN SODIUM 5000 UNITS: 5000 INJECTION INTRAVENOUS; SUBCUTANEOUS at 21:00

## 2021-01-03 RX ADMIN — HYDRALAZINE HYDROCHLORIDE 25 MG: 25 TABLET, FILM COATED ORAL at 14:43

## 2021-01-03 RX ADMIN — QUETIAPINE FUMARATE 50 MG: 25 TABLET ORAL at 20:14

## 2021-01-03 RX ADMIN — CITALOPRAM HYDROBROMIDE 40 MG: 20 TABLET ORAL at 09:52

## 2021-01-03 RX ADMIN — PREGABALIN 25 MG: 25 CAPSULE ORAL at 09:52

## 2021-01-03 RX ADMIN — ASPIRIN 81 MG 81 MG: 81 TABLET ORAL at 09:52

## 2021-01-03 RX ADMIN — TORSEMIDE 100 MG: 100 TABLET ORAL at 09:52

## 2021-01-03 RX ADMIN — TIZANIDINE 4 MG: 4 TABLET ORAL at 09:52

## 2021-01-03 RX ADMIN — TIZANIDINE 4 MG: 4 TABLET ORAL at 14:43

## 2021-01-03 RX ADMIN — PRAVASTATIN SODIUM 80 MG: 80 TABLET ORAL at 20:15

## 2021-01-03 RX ADMIN — PANTOPRAZOLE SODIUM 40 MG: 40 TABLET, DELAYED RELEASE ORAL at 06:12

## 2021-01-03 RX ADMIN — Medication 10 ML: at 20:15

## 2021-01-03 RX ADMIN — FLUTICASONE PROPIONATE 1 SPRAY: 50 SPRAY, METERED NASAL at 09:53

## 2021-01-03 RX ADMIN — GLYCOPYRROLATE AND FORMOTEROL FUMARATE 2 PUFF: 9; 4.8 AEROSOL, METERED RESPIRATORY (INHALATION) at 20:14

## 2021-01-03 RX ADMIN — CALCIUM ACETATE 667 MG: 667 CAPSULE ORAL at 09:55

## 2021-01-03 RX ADMIN — Medication 10 ML: at 09:53

## 2021-01-03 RX ADMIN — TRAZODONE HYDROCHLORIDE 150 MG: 50 TABLET ORAL at 20:15

## 2021-01-03 RX ADMIN — CALCIUM ACETATE 667 MG: 667 CAPSULE ORAL at 16:45

## 2021-01-03 RX ADMIN — HYDRALAZINE HYDROCHLORIDE 25 MG: 25 TABLET, FILM COATED ORAL at 21:01

## 2021-01-03 RX ADMIN — CALCIUM ACETATE 667 MG: 667 CAPSULE ORAL at 12:23

## 2021-01-03 RX ADMIN — HEPARIN SODIUM 5000 UNITS: 5000 INJECTION INTRAVENOUS; SUBCUTANEOUS at 16:46

## 2021-01-03 RX ADMIN — PREGABALIN 25 MG: 25 CAPSULE ORAL at 14:43

## 2021-01-03 RX ADMIN — DOCUSATE SODIUM 100 MG: 100 CAPSULE, LIQUID FILLED ORAL at 23:34

## 2021-01-03 RX ADMIN — INSULIN GLARGINE 70 UNITS: 100 INJECTION, SOLUTION SUBCUTANEOUS at 21:01

## 2021-01-03 RX ADMIN — NEPHROCAP 1 MG: 1 CAP ORAL at 09:52

## 2021-01-03 RX ADMIN — INSULIN LISPRO 20 UNITS: 100 INJECTION, SOLUTION INTRAVENOUS; SUBCUTANEOUS at 16:46

## 2021-01-03 RX ADMIN — DULOXETINE HYDROCHLORIDE 30 MG: 30 CAPSULE, DELAYED RELEASE ORAL at 09:52

## 2021-01-03 RX ADMIN — ISOSORBIDE MONONITRATE 30 MG: 30 TABLET, EXTENDED RELEASE ORAL at 09:52

## 2021-01-03 RX ADMIN — OXYCODONE HYDROCHLORIDE AND ACETAMINOPHEN 1 TABLET: 7.5; 325 TABLET ORAL at 12:21

## 2021-01-03 RX ADMIN — OXYCODONE HYDROCHLORIDE AND ACETAMINOPHEN 1 TABLET: 7.5; 325 TABLET ORAL at 20:14

## 2021-01-03 RX ADMIN — PREGABALIN 25 MG: 25 CAPSULE ORAL at 20:15

## 2021-01-03 RX ADMIN — TIZANIDINE 4 MG: 4 TABLET ORAL at 20:14

## 2021-01-03 ASSESSMENT — PAIN SCALES - GENERAL
PAINLEVEL_OUTOF10: 9
PAINLEVEL_OUTOF10: 0
PAINLEVEL_OUTOF10: 0

## 2021-01-03 NOTE — PROGRESS NOTES
01/02/21 2224   NIV Type   Equipment Type v60   Mode CPAP   Mask Type Full face mask   Mask Size Large   Settings/Measurements   CPAP/EPAP 5 cmH2O   Resp 16   FiO2  25 %   Vt Exhaled 761 mL   Minute Volume 8.9 Liters   Mask Leak (lpm) 15 lpm   Comfort Level Good   Using Accessory Muscles No   SpO2 98   Breath Sounds   Right Upper Lobe Diminished   Right Middle Lobe Diminished   Right Lower Lobe Diminished   Left Upper Lobe Diminished   Left Lower Lobe Diminished   Alarm Settings   Alarms On Y   Press Low Alarm 4 cmH2O   High Pressure Alarm 25 cmH2O   Resp Rate Low Alarm 6   High Respiratory Rate 40 br/min

## 2021-01-03 NOTE — PROGRESS NOTES
Progress Note    HISTORY     CC:  Fall and weakness           We are following for ESRD       Subjective/   HPI:  Says some of the feeling is back. He wants to go home with home health. No new issues today. About to eat dinner. ROS:  Constitutional:  No fevers, No Chills, + weakness  Cardiovascular:  No palpations  Respiratory:  No wheezing, no cough  Skin:  No rash, no itching    Social Hx:  No family at bedside     Past Medical and Surgical History:  - Reviewed, no changes     EXAM       Objective/     Vitals:    01/03/21 0600 01/03/21 0946 01/03/21 1202 01/03/21 1444   BP: 117/75 121/61 (!) 129/95 (!) 138/94   Pulse: 73 79 82 77   Resp: 13 16 18 16   Temp: 97.6 °F (36.4 °C) 97.7 °F (36.5 °C) 97.8 °F (36.6 °C) 97.9 °F (36.6 °C)   TempSrc: Axillary Axillary Oral Oral   SpO2: 93% 97% 98% 96%   Weight:       Height:         24HR INTAKE/OUTPUT:    No intake or output data in the 24 hours ending 01/03/21 1828  Constitutional:  NAD  Eyes:  Pupils reactive, sclera clear   Neck:  Normal thyroid, no masses   Cardiovascular:  Regular, no rub  Respiratory:  No distress, no wheezing  Psychiatry:  Flat mood/affect, alert  Abdomen: +bs, soft, nt, no masses   Musculoskeletal: No LE edema, no clubbing   Lymphatics:  No LAD in neck, no supraclavicular nodes   Access:  [facility]      MEDICAL DECISION MAKING       Data/  Recent Labs     01/01/21  1112 01/02/21  0815   WBC 6.2 6.0   HGB 9.1* 9.0*   HCT 28.1* 27.1*   MCV 90.7 89.0    250     Recent Labs     01/01/21  1112 01/02/21  0815   * 131*   K 4.5 4.3   CL 90* 90*   CO2 28 27   GLUCOSE 146* 143*   BUN 57* 63*   CREATININE 6.0* 6.6*   LABGLOM 10* 9*   GFRAA 12* 11*       Assessment/     ESRD:  On dialysis with a left TDC. Had a fistula but developed vascular access steal and failed PD. Has been declined for kidney transplant due to medical conditions      Weakness:  Seen by neurology at last admission.   Planning for placement at nursing home but then went home after some improvement. Had MRI and LP for LE numbness. Diagnosed with neuropathy     Anemia:  Drifting below target,      LE pressure ulcers:  Wound care following. Vascular surgery following.       Dizziness/weakness/fall:  - Worse with hyperkalemia. Has improved but legs remain week     Hyperkalemia:  Due to ESRD and diet.   Resolved with dialysis     Plan/     HD tomorrow   Follow labs  Aranesp with next dialysis   -----------------------------  Raymundo Church M.D.   [facility]

## 2021-01-03 NOTE — PROGRESS NOTES
Hospitalist Progress Note      PCP: Aida Palacio MD    Date of Admission: 12/30/2020      Subjective:     Patient states she feels somewhat better. . Strength in the legs improving but main concern is persistent numbness    Medications:  Reviewed    Infusion Medications    dextrose       Scheduled Medications    [START ON 1/4/2021] darbepoetin siddharth-polysorbate  40 mcg Intravenous Weekly - Monday    aspirin  81 mg Oral Daily    Calcium Acetate (Phos Binder)  1 capsule Oral TID WC    citalopram  40 mg Oral Daily    clopidogrel  75 mg Oral Daily    DULoxetine  30 mg Oral Daily    fluticasone  1 spray Each Nostril BID    hydrALAZINE  25 mg Oral 3 times per day    insulin glargine  70 Units Subcutaneous Nightly    insulin lispro  20 Units Subcutaneous TID WC    isosorbide mononitrate  30 mg Oral Daily    losartan  50 mg Oral Daily    pravastatin  80 mg Oral Nightly    pantoprazole  40 mg Oral QAM AC    QUEtiapine  50 mg Oral QPM    glycopyrrolate-formoterol  2 puff Inhalation BID    torsemide  100 mg Oral Daily    traZODone  150 mg Oral Nightly    tiZANidine  4 mg Oral TID    pregabalin  25 mg Oral TID    linaclotide  145 mcg Oral QAM AC    b complex-C-folic acid  1 capsule Oral Daily    sodium chloride flush  10 mL Intravenous 2 times per day    insulin lispro  0-12 Units Subcutaneous TID WC    insulin lispro  0-6 Units Subcutaneous Nightly    insulin regular  10 Units Intravenous Once    dextrose  25 g Intravenous Once    calcium gluconate-NaCl  1 g Intravenous Once    doxercalciferol  3.5 mcg Intravenous Q MWF     PRN Meds: albuterol, hydrOXYzine, oxyCODONE-acetaminophen, calcium carbonate, sodium chloride flush, promethazine **OR** ondansetron, polyethylene glycol, acetaminophen **OR** acetaminophen, glucose, dextrose, glucagon (rDNA), dextrose    No intake or output data in the 24 hours ending 01/03/21 1442    Exam:    BP (!) 129/95   Pulse 82   Temp 97.8 °F (36.6 °C) (Oral) Resp 18   Ht 5' 9\" (1.753 m)   Wt 256 lb 6.3 oz (116.3 kg)   SpO2 98%   BMI 37.86 kg/m²     General appearance: No apparent distress, appears stated age and cooperative. HEENT: Pupils equal, round, and reactive to light. Conjunctivae/corneas clear. Neck: Supple, with full range of motion. No jugular venous distention. Trachea midline. Respiratory:  Normal respiratory effort. Clear to auscultation, bilaterally without Rales/Wheezes/Rhonchi. Cardiovascular: Regular rate and rhythm with normal S1/S2 without murmurs, rubs or gallops. Abdomen: Soft, non-tender, non-distended with normal bowel sounds. Musculoskeletal: No clubbing, cyanosis or edema bilaterally. Neurologic:  Cranial nerves: II-XII intact,   Psychiatric: Alert and oriented, thought content appropriate, normal insight  Capillary Refill: Brisk,< 3 seconds   Peripheral Pulses: +2 palpable, equal bilaterally       Labs:   Recent Labs     01/01/21  1112 01/02/21  0815   WBC 6.2 6.0   HGB 9.1* 9.0*   HCT 28.1* 27.1*    250     Recent Labs     01/01/21  1112 01/02/21  0815   * 131*   K 4.5 4.3   CL 90* 90*   CO2 28 27   BUN 57* 63*   CREATININE 6.0* 6.6*   CALCIUM 8.8 8.7     No results for input(s): AST, ALT, BILIDIR, BILITOT, ALKPHOS in the last 72 hours. No results for input(s): INR in the last 72 hours. No results for input(s): Lewanda Bene in the last 72 hours. Assessment/Plan:    -Severe diabetic polyneuropathy-- . Recent thoracic, cervical and lumbar MRIs showed only moderate right L5 narrowing with L5 S1 disc protrusion. work-up ruled out demyelinating disease/GBS was also negative-seen by neurology-on Lyrica--- leg weakness is improving but complains of severe numbness     -Recurrent falls with weakness/ambulatory dysfunction due to above-        -Junctional bradycardia--heart rate in the 40s--resolved-coreg on hold . seen by EP        -ESRD on hemodialysis MW--follow with nephrology     -Severe

## 2021-01-03 NOTE — PROGRESS NOTES
01/03/21 0459   NIV Type   Equipment Type v60   Mode CPAP   Mask Type Full face mask   Mask Size Large   Settings/Measurements   CPAP/EPAP 5 cmH2O   Resp 14   FiO2  25 %   Vt Exhaled 561 mL   Mask Leak (lpm) 4 lpm   Comfort Level Good   Using Accessory Muscles No   SpO2 95   Alarm Settings   Alarms On Y   Press Low Alarm 4 cmH2O   High Pressure Alarm 25 cmH2O   Apnea (secs) 20 secs   Resp Rate Low Alarm 6   High Respiratory Rate 40 br/min

## 2021-01-04 LAB
ANION GAP SERPL CALCULATED.3IONS-SCNC: 13 MMOL/L (ref 3–16)
BASOPHILS ABSOLUTE: 0.1 K/UL (ref 0–0.2)
BASOPHILS RELATIVE PERCENT: 1 %
BUN BLDV-MCNC: 43 MG/DL (ref 7–20)
CALCIUM SERPL-MCNC: 9.1 MG/DL (ref 8.3–10.6)
CHLORIDE BLD-SCNC: 96 MMOL/L (ref 99–110)
CO2: 26 MMOL/L (ref 21–32)
CREAT SERPL-MCNC: 5.7 MG/DL (ref 0.9–1.3)
EOSINOPHILS ABSOLUTE: 0.3 K/UL (ref 0–0.6)
EOSINOPHILS RELATIVE PERCENT: 4.4 %
GFR AFRICAN AMERICAN: 13
GFR NON-AFRICAN AMERICAN: 10
GLUCOSE BLD-MCNC: 142 MG/DL (ref 70–99)
GLUCOSE BLD-MCNC: 162 MG/DL (ref 70–99)
GLUCOSE BLD-MCNC: 168 MG/DL (ref 70–99)
GLUCOSE BLD-MCNC: 175 MG/DL (ref 70–99)
GLUCOSE BLD-MCNC: 220 MG/DL (ref 70–99)
HBV SURFACE AB TITR SER: <3.5 MIU/ML
HCT VFR BLD CALC: 28.5 % (ref 40.5–52.5)
HEMOGLOBIN: 9.4 G/DL (ref 13.5–17.5)
HEPATITIS B SURFACE ANTIGEN INTERPRETATION: NORMAL
LYMPHOCYTES ABSOLUTE: 1.1 K/UL (ref 1–5.1)
LYMPHOCYTES RELATIVE PERCENT: 14.1 %
MCH RBC QN AUTO: 29.5 PG (ref 26–34)
MCHC RBC AUTO-ENTMCNC: 33 G/DL (ref 31–36)
MCV RBC AUTO: 89.6 FL (ref 80–100)
MONOCYTES ABSOLUTE: 0.6 K/UL (ref 0–1.3)
MONOCYTES RELATIVE PERCENT: 8.2 %
NEUTROPHILS ABSOLUTE: 5.6 K/UL (ref 1.7–7.7)
NEUTROPHILS RELATIVE PERCENT: 72.3 %
PDW BLD-RTO: 14.2 % (ref 12.4–15.4)
PERFORMED ON: ABNORMAL
PLATELET # BLD: 269 K/UL (ref 135–450)
PMV BLD AUTO: 7.9 FL (ref 5–10.5)
POTASSIUM SERPL-SCNC: 4.5 MMOL/L (ref 3.5–5.1)
RBC # BLD: 3.19 M/UL (ref 4.2–5.9)
SODIUM BLD-SCNC: 135 MMOL/L (ref 136–145)
WBC # BLD: 7.7 K/UL (ref 4–11)

## 2021-01-04 PROCEDURE — 94761 N-INVAS EAR/PLS OXIMETRY MLT: CPT

## 2021-01-04 PROCEDURE — 2700000000 HC OXYGEN THERAPY PER DAY

## 2021-01-04 PROCEDURE — 1200000000 HC SEMI PRIVATE

## 2021-01-04 PROCEDURE — 36415 COLL VENOUS BLD VENIPUNCTURE: CPT

## 2021-01-04 PROCEDURE — 87340 HEPATITIS B SURFACE AG IA: CPT

## 2021-01-04 PROCEDURE — 97110 THERAPEUTIC EXERCISES: CPT

## 2021-01-04 PROCEDURE — 85025 COMPLETE CBC W/AUTO DIFF WBC: CPT

## 2021-01-04 PROCEDURE — 97530 THERAPEUTIC ACTIVITIES: CPT

## 2021-01-04 PROCEDURE — 6370000000 HC RX 637 (ALT 250 FOR IP): Performed by: HOSPITALIST

## 2021-01-04 PROCEDURE — 94660 CPAP INITIATION&MGMT: CPT

## 2021-01-04 PROCEDURE — 86706 HEP B SURFACE ANTIBODY: CPT

## 2021-01-04 PROCEDURE — 97116 GAIT TRAINING THERAPY: CPT

## 2021-01-04 PROCEDURE — 6370000000 HC RX 637 (ALT 250 FOR IP): Performed by: NURSE PRACTITIONER

## 2021-01-04 PROCEDURE — 6360000002 HC RX W HCPCS: Performed by: HOSPITALIST

## 2021-01-04 PROCEDURE — 90935 HEMODIALYSIS ONE EVALUATION: CPT

## 2021-01-04 PROCEDURE — 2500000003 HC RX 250 WO HCPCS: Performed by: HOSPITALIST

## 2021-01-04 PROCEDURE — 94640 AIRWAY INHALATION TREATMENT: CPT

## 2021-01-04 PROCEDURE — 80048 BASIC METABOLIC PNL TOTAL CA: CPT

## 2021-01-04 PROCEDURE — 2580000003 HC RX 258: Performed by: HOSPITALIST

## 2021-01-04 RX ORDER — HEPARIN SODIUM 1000 [USP'U]/ML
INJECTION, SOLUTION INTRAVENOUS; SUBCUTANEOUS
Status: DISPENSED
Start: 2021-01-04 | End: 2021-01-05

## 2021-01-04 RX ADMIN — QUETIAPINE FUMARATE 50 MG: 25 TABLET ORAL at 21:59

## 2021-01-04 RX ADMIN — GLYCOPYRROLATE AND FORMOTEROL FUMARATE 2 PUFF: 9; 4.8 AEROSOL, METERED RESPIRATORY (INHALATION) at 21:52

## 2021-01-04 RX ADMIN — CALCIUM ACETATE 667 MG: 667 CAPSULE ORAL at 13:26

## 2021-01-04 RX ADMIN — INSULIN LISPRO 2 UNITS: 100 INJECTION, SOLUTION INTRAVENOUS; SUBCUTANEOUS at 13:45

## 2021-01-04 RX ADMIN — INSULIN LISPRO 4 UNITS: 100 INJECTION, SOLUTION INTRAVENOUS; SUBCUTANEOUS at 17:56

## 2021-01-04 RX ADMIN — CITALOPRAM HYDROBROMIDE 40 MG: 20 TABLET ORAL at 13:27

## 2021-01-04 RX ADMIN — OXYCODONE HYDROCHLORIDE AND ACETAMINOPHEN 1 TABLET: 7.5; 325 TABLET ORAL at 22:10

## 2021-01-04 RX ADMIN — Medication 10 ML: at 13:28

## 2021-01-04 RX ADMIN — HEPARIN SODIUM 5000 UNITS: 5000 INJECTION INTRAVENOUS; SUBCUTANEOUS at 13:29

## 2021-01-04 RX ADMIN — INSULIN LISPRO 20 UNITS: 100 INJECTION, SOLUTION INTRAVENOUS; SUBCUTANEOUS at 17:56

## 2021-01-04 RX ADMIN — CLOPIDOGREL BISULFATE 75 MG: 75 TABLET, FILM COATED ORAL at 13:27

## 2021-01-04 RX ADMIN — PREGABALIN 25 MG: 25 CAPSULE ORAL at 21:59

## 2021-01-04 RX ADMIN — PRAVASTATIN SODIUM 80 MG: 80 TABLET ORAL at 21:59

## 2021-01-04 RX ADMIN — INSULIN LISPRO 1 UNITS: 100 INJECTION, SOLUTION INTRAVENOUS; SUBCUTANEOUS at 21:58

## 2021-01-04 RX ADMIN — HYDRALAZINE HYDROCHLORIDE 25 MG: 25 TABLET, FILM COATED ORAL at 05:44

## 2021-01-04 RX ADMIN — INSULIN GLARGINE 70 UNITS: 100 INJECTION, SOLUTION SUBCUTANEOUS at 21:58

## 2021-01-04 RX ADMIN — OXYCODONE HYDROCHLORIDE AND ACETAMINOPHEN 1 TABLET: 7.5; 325 TABLET ORAL at 15:22

## 2021-01-04 RX ADMIN — NEPHROCAP 1 MG: 1 CAP ORAL at 13:26

## 2021-01-04 RX ADMIN — Medication 10 ML: at 21:59

## 2021-01-04 RX ADMIN — HEPARIN SODIUM 5000 UNITS: 5000 INJECTION INTRAVENOUS; SUBCUTANEOUS at 05:44

## 2021-01-04 RX ADMIN — ASPIRIN 81 MG 81 MG: 81 TABLET ORAL at 13:28

## 2021-01-04 RX ADMIN — TIZANIDINE 4 MG: 4 TABLET ORAL at 21:59

## 2021-01-04 RX ADMIN — HYDRALAZINE HYDROCHLORIDE 25 MG: 25 TABLET, FILM COATED ORAL at 13:27

## 2021-01-04 RX ADMIN — ANTACID TABLETS 500 MG: 500 TABLET, CHEWABLE ORAL at 13:26

## 2021-01-04 RX ADMIN — CALCIUM ACETATE 667 MG: 667 CAPSULE ORAL at 17:55

## 2021-01-04 RX ADMIN — PANTOPRAZOLE SODIUM 40 MG: 40 TABLET, DELAYED RELEASE ORAL at 05:44

## 2021-01-04 RX ADMIN — TRAZODONE HYDROCHLORIDE 150 MG: 50 TABLET ORAL at 21:59

## 2021-01-04 RX ADMIN — HEPARIN SODIUM 5000 UNITS: 5000 INJECTION INTRAVENOUS; SUBCUTANEOUS at 21:58

## 2021-01-04 RX ADMIN — DOCUSATE SODIUM 100 MG: 100 CAPSULE, LIQUID FILLED ORAL at 13:26

## 2021-01-04 RX ADMIN — ISOSORBIDE MONONITRATE 30 MG: 30 TABLET, EXTENDED RELEASE ORAL at 13:27

## 2021-01-04 RX ADMIN — HYDRALAZINE HYDROCHLORIDE 25 MG: 25 TABLET, FILM COATED ORAL at 21:59

## 2021-01-04 RX ADMIN — TIZANIDINE 4 MG: 4 TABLET ORAL at 13:27

## 2021-01-04 RX ADMIN — TORSEMIDE 100 MG: 100 TABLET ORAL at 13:26

## 2021-01-04 RX ADMIN — HYDROXYZINE PAMOATE 50 MG: 25 CAPSULE ORAL at 22:10

## 2021-01-04 RX ADMIN — DULOXETINE HYDROCHLORIDE 30 MG: 30 CAPSULE, DELAYED RELEASE ORAL at 13:27

## 2021-01-04 RX ADMIN — PREGABALIN 25 MG: 25 CAPSULE ORAL at 13:27

## 2021-01-04 RX ADMIN — INSULIN LISPRO 20 UNITS: 100 INJECTION, SOLUTION INTRAVENOUS; SUBCUTANEOUS at 13:40

## 2021-01-04 ASSESSMENT — PAIN SCALES - GENERAL
PAINLEVEL_OUTOF10: 0
PAINLEVEL_OUTOF10: 8
PAINLEVEL_OUTOF10: 6
PAINLEVEL_OUTOF10: 0

## 2021-01-04 NOTE — PROGRESS NOTES
Physical Therapy  Facility/Department: [facility] B3 - MED SURG  Daily Treatment Note  NAME: Agustin De Santiago  : 1970  MRN: [id number]    Date of Service: 2021    Discharge Recommendations:  Subacute/Skilled Nursing Facility   PT Equipment Recommendations  Equipment Needed: No  Other: Defer to facility. Pt does own motorized scooter and rollator. Assessment   Body structures, Functions, Activity limitations: Decreased functional mobility ; Decreased strength;Decreased ROM; Decreased endurance;Decreased balance;Decreased sensation; Increased pain  Assessment: Pt seen for therapy tx focused on transfer training, gait training, and BLE exercises. Pt required decreased assistance this date; CGA for transfers and ambulation with RW for 30', no w/c follow this date. Pt will benefit from continued skilled PT to address deficits listed above. Recommend SNF at d/c to progress functional independence and safety as pt has had multiple falls at home since last admission. Treatment Diagnosis: Impaired sensation and weakness BLEs. Prognosis: Fair  PT Education: Goals;PT Role;Plan of Care;Transfer Training;General Safety;Gait Training;Disease Specific Education; Functional Mobility Training  Patient Education: pt educated on safety in hospital room including calling out for help with transfers and ambulating the the bathroom. Pt verbalizes understanding but will require reinforcement  REQUIRES PT FOLLOW UP: Yes  Activity Tolerance  Activity Tolerance: Patient limited by pain; Patient limited by endurance  Activity Tolerance: SpO2 97% on RA; HR 94 bpm following ambulation     Patient Diagnosis(es): The primary encounter diagnosis was Fall, initial encounter. A diagnosis of Diabetic polyneuropathy associated with type 2 diabetes mellitus ([facility] [address] was also pertinent to this visit.      has a past medical history of Ambulatory dysfunction, Aortic stenosis, Arthritis, Asthma, Bilateral hilar adenopathy syndrome, CAD (coronary artery disease), Cardiomyopathy ([address] CHF (congestive heart failure) ([address] Chronic pain, COPD (chronic obstructive pulmonary disease) ([address] Depression, Diabetes mellitus ([address] Difficult intravenous access, Emphysema of lung ([address] ESRD (end stage renal disease) on dialysis ([address] Fear of needles, Gastric ulcer, GERD (gastroesophageal reflux disease), Heart valve problem, Hemodialysis patient ([address] History of spinal fracture, Hx of blood clots, Hyperlipidemia, Hypertension, MI (myocardial infarction) ([address] Neuromuscular disorder ([address] Numbness and tingling in left arm, Pneumonia, PONV (postoperative nausea and vomiting), Prolonged emergence from general anesthesia, Sleep apnea, Stroke ([address] TIA (transient ischemic attack), and Unspecified diseases of blood and blood-forming organs. has a past surgical history that includes Tonsillectomy; cyst removal (08/14/2013); Colonoscopy; Coronary angioplasty with stent (05/26/15); vascular surgery (aprx 2 years ago); Colonoscopy (2/29/2015); Upper gastrointestinal endoscopy (01/06/2016); Upper gastrointestinal endoscopy (01/29/2017); other surgical history (02/01/2017); Dialysis fistula creation (Left, 10/30/2017); Diagnostic Cardiac Cath Lab Procedure; other surgical history (2018); other surgical history (Bilateral, 06/26/2018); pr lap insertion tunneled intraperitoneal catheter (N/A, 9/21/2018); other surgical history (Right, 09/2018); Dialysis fistula creation (Left, 3/27/2019); other surgical history (05/28/2019); Endoscopy, colon, diagnostic; Catheter Removal (Right, 7/2/2019); and Aortic valve replacement (N/A, 10/15/2019). Restrictions  Restrictions/Precautions  Restrictions/Precautions: Fall Risk  Position Activity Restriction  Other position/activity restrictions: tele     Subjective   General  Chart Reviewed: Yes  Response To Previous Treatment: Patient with no complaints from previous session.   Family / Caregiver Present: No  Referring Practitioner: Claudia Simons Pt will ambulate 25ft with LRAD and min A - 1/4 GOAL MET; --- New Goal: pt will ambulate 50ft with RW and SBA  Short term goal 4: By 1/2/21 pt will tolerate x10-12 reps BLE exercise to assist with functional transfers and ambulation. - MET 12/31  Patient Goals   Patient goals : \"To go to rehab and get stronger. \"    Plan    Plan  Times per week: 3-5x/wk  Times per day: Daily  Current Treatment Recommendations: Strengthening, Balance Training, Transfer Training, Gait Training, Functional Mobility Training, Endurance Training, Safety Education & Training, Patient/Caregiver Education & Training  Safety Devices  Type of devices: All fall risk precautions in place, Call light within reach, Chair alarm in place, Gait belt, Patient at risk for falls, Left in chair, Nurse notified  Restraints  Initially in place: No     Therapy Time   Individual Concurrent Group Co-treatment   Time In 1330         Time Out 1355         Minutes 25         Timed Code Treatment Minutes: 25 Minutes     If pt is unable to be seen after this session, please let this note serve as discharge summary. Please see case management note for discharge disposition. Thank you.     Aixa Salcido, PT

## 2021-01-04 NOTE — CARE COORDINATION
CM called and spoke with patient via hospital room phone to confirm discharge plan. Pt states \"I'm going home with home care\". Pt verified agency is [facility]. Liaison, Alexa Felipe, states still following. Pt does NOT want SNF. Original referral to [facility]. CM spoke with Kandi at facility to notify no need to follow.

## 2021-01-04 NOTE — FLOWSHEET NOTE
Treatment time: 3.5 hours  Net UF: 200 ml     Pre weight: 117.6 kg   Post weight: 115.5 kg  EDW: 117 kg     Access used: LCW CVC  Access function: Good with  ml/min     Medications or blood products given: None     Regular outpatient schedule: [address] [place] [address]     Summary of response to treatment: Tolerated tx well.  No HD related complaints or complications.      Copy of dialysis treatment record placed in chart, to be scanned into EMR.       01/04/21 0920 01/04/21 1245   Treatment   Time On  --  0907   Time Off  --  1242   Vital Signs   /71 (!) 149/69   Temp 97.5 °F (36.4 °C) 97 °F (36.1 °C)   Pulse 80 86   Resp 18 18   Dialysis Bath   K+ (Potassium) 3  --    Ca+ (Calcium) 2.5  --    Na+ (Sodium) 138  --    HCO3 (Bicarb) 32  --

## 2021-01-04 NOTE — PROGRESS NOTES
01/04/21 0026   NIV Type   $NIV $Daily Charge   Skin Assessment Clean, dry, & intact   Equipment Type v60   Mode CPAP   Mask Type Full face mask   Mask Size Large   Settings/Measurements   CPAP/EPAP 5 cmH2O   Resp 16   FiO2  25 %   Vt Exhaled 601 mL   Minute Volume 7.3 Liters   Mask Leak (lpm) 0 lpm   Comfort Level Good   Using Accessory Muscles No

## 2021-01-04 NOTE — PROGRESS NOTES
Occupational Therapy  Facility/Department: [facility] B3 - MED SURG  Daily Treatment Note  NAME: Kellie Aguirre  : 1970  MRN: [id number]    Date of Service: 2021    Discharge Recommendations:  Subacute/Skilled Nursing Facility     Assessment   Performance deficits / Impairments: Decreased functional mobility ; Decreased ADL status; Decreased safe awareness;Decreased balance;Decreased endurance  Assessment: Pt met 2 OT goals today. He reports less numbness in BLE. Pt currently requires CGA for safe transfers and ambulation in room with RW. He does demo impaired insight into deficits, resulting potential fall risk. Recommend SNF at d/c. Cont skilled OT in acute care. Treatment Diagnosis: LE weakness and impaired balance  Prognosis: Fair  OT Education: OT Role;Plan of Care;Transfer Training;ADL Adaptive Strategies  Patient Education: disease specific ed:  role of OT, safe mobility with walker, need to call for assistance for transfers  REQUIRES OT FOLLOW UP: Yes  Activity Tolerance  Activity Tolerance: Patient Tolerated treatment well  Safety Devices  Safety Devices in place: Yes  Type of devices: Chair alarm in place;Call light within reach;Nurse notified; Left in chair;Gait belt; All fall risk precautions in place       Patient Diagnosis(es): The primary encounter diagnosis was Fall, initial encounter. A diagnosis of Diabetic polyneuropathy associated with type 2 diabetes mellitus ([address] was also pertinent to this visit.       has a past medical history of Ambulatory dysfunction, Aortic stenosis, Arthritis, Asthma, Bilateral hilar adenopathy syndrome, CAD (coronary artery disease), Cardiomyopathy ([address] CHF (congestive heart failure) ([address] Chronic pain, COPD (chronic obstructive pulmonary disease) ([address] Depression, Diabetes mellitus ([address] Difficult intravenous access, Emphysema of lung ([address] ESRD (end stage renal disease) on dialysis ([address] Fear of needles, Gastric ulcer, GERD (gastroesophageal reflux disease), Heart valve problem, Hemodialysis patient ([address] History of spinal fracture, Hx of blood clots, Hyperlipidemia, Hypertension, MI (myocardial infarction) ([address] Neuromuscular disorder ([address] Numbness and tingling in left arm, Pneumonia, PONV (postoperative nausea and vomiting), Prolonged emergence from general anesthesia, Sleep apnea, Stroke ([address] TIA (transient ischemic attack), and Unspecified diseases of blood and blood-forming organs. has a past surgical history that includes Tonsillectomy; cyst removal (08/14/2013); Colonoscopy; Coronary angioplasty with stent (05/26/15); vascular surgery (aprx 2 years ago); Colonoscopy (2/29/2015); Upper gastrointestinal endoscopy (01/06/2016); Upper gastrointestinal endoscopy (01/29/2017); other surgical history (02/01/2017); Dialysis fistula creation (Left, 10/30/2017); Diagnostic Cardiac Cath Lab Procedure; other surgical history (2018); other surgical history (Bilateral, 06/26/2018); pr lap insertion tunneled intraperitoneal catheter (N/A, 9/21/2018); other surgical history (Right, 09/2018); Dialysis fistula creation (Left, 3/27/2019); other surgical history (05/28/2019); Endoscopy, colon, diagnostic; Catheter Removal (Right, 7/2/2019); and Aortic valve replacement (N/A, 10/15/2019). Restrictions  Restrictions/Precautions  Restrictions/Precautions: Fall Risk  Position Activity Restriction  Other position/activity restrictions: tele  Subjective   General  Chart Reviewed: Yes  Patient assessed for rehabilitation services?: Yes  Family / Caregiver Present: No  Referring Practitioner: Terry Stout MD  Diagnosis: LE weakness, fall  Subjective  Subjective: Pt agreeable to OT, reporting feeling better today. General Comment  Comments: RN approved therapy.   Vital Signs  Patient Currently in Pain: No   Orientation  Orientation  Overall Orientation Status: Within Normal Limits  Objective    ADL  Feeding: Independent  Grooming: Setup(wash hands while seated)  LE Dressing: Maximum assistance(socks)      Balance  Sitting Balance: Supervision  Standing Balance: Contact guard assistance(RW)  Standing Balance  Time: >2 min  Functional Mobility  Functional - Mobility Device: Rolling Walker  Activity: Other(from bed to outside hallway door with RW)  Assist Level: Contact guard assistance  Bed mobility  Supine to Sit: Supervision(HOB elevated)  Sit to Supine: Unable to assess  Transfers  Stand Pivot Transfers: Contact guard assistance(RW)  Sit to stand: Contact guard assistance  Stand to sit: Contact guard assistance      Cognition  Arousal/Alertness: Appropriate responses to stimuli  Following Commands: Follows one step commands consistently  Attention Span: Attends with cues to redirect  Safety Judgement: Decreased awareness of need for safety;Decreased awareness of need for assistance  Problem Solving: Decreased awareness of errors  Insights: Decreased awareness of deficits      Type of ROM/Therapeutic Exercise  Type of ROM/Therapeutic Exercise: AROM  Comment: seated  Exercises  Shoulder Flexion: 10x  Horizontal ABduction: 10x  Horizontal ADduction: 10x  Elbow Flexion: 10x  Elbow Extension: 10x  Grasp/Release: 10x       Plan   Plan  Times per week: 3-5x  Current Treatment Recommendations: Self-Care / ADL, Safety Education & Training, Functional Mobility Training, Balance Training, Endurance Training, Equipment Evaluation, Education, & procurement  AM-PAC Score   -[facility] Inpatient Daily Activity Raw Score: 18 (01/04/21 1456)  AM-PAC Inpatient ADL T-Scale Score : 38.66 (01/04/21 1456)  ADL Inpatient CMS 0-100% Score: 46.65 (01/04/21 1456)  ADL Inpatient CMS G-Code Modifier : CK (01/04/21 1456)  Goals  Short term goals  Time Frame for Short term goals: 1 week (1/6) unless noted  Short term goal 1: Perform functional transfer with mod A with AD-GOAL MET 1/4.  New goal:  SBA for functional transfers with walker  Short term goal 2: Perform toileting with min A-ongoing 1/4  Short term goal 3: Perform UE exer 15x each to improve endurance by 1/4 (extend to 1/8)-ongoing, 10x each 1/4  Short term goal 4: Stand > 1 min with walker and mod A for ADL tasks-GOAL MET 1/4, CGA with walker x2 min. New goal: Stand at sink for 2 ADL tasks with SBA  Patient Goals   Patient goals : \"be able to walk\"     Therapy Time   Individual Concurrent Group Co-treatment   Time In 1330         Time Out 1355         Minutes 25         Timed Code Treatment Minutes: 25 Minutes    If pt is discharged prior to next OT session, this note will serve as the discharge summary.   Kyra Alvarado OT

## 2021-01-04 NOTE — PROGRESS NOTES
Occupational Therapy/Physical Therapy  Pt is presently at dialysis. Cont OT/PT later today as schedule allows.   Karlos Ibanez OT  Lenin Henson PT

## 2021-01-04 NOTE — PROGRESS NOTES
Hospitalist Progress Note      PCP: Anna Elkins MD    Date of Admission: 12/30/2020    Chief Complaint: Progressive weakness, recurrent falls    Hospital Course: 44 y.o. male with history of ESRD on hemodialysis, morbid obesity, NICM, DM with severe peripheral neuropathy,PAD s/p stents, ZAINAB on CPAP presented to emergency room with progressive leg weakness, numbness and recurrent falls. .The patient was recently admitted to this hospital for similar reason. .. Had extensive work-up by neurology-demyelinating disorder was ruled out, MRI lumbar thoracic/cervical/spine showed  moderate right L5 narrowing with L5 S1 disc protrusion with very mild DJD disease elsewhere. .  Over the last several days he has had progressive weakness in the lower legs with decreased sensation and recurrent falls. He had been able to ambulate independently up until this point. Subjective: EMR notes reviewed patient seen and examined. Complains of a little bit of incisional right groin pain. Had dialysis today without issues. No fevers chills nausea vomiting or diarrhea.       Medications:  Reviewed    Infusion Medications    dextrose       Scheduled Medications    heparin (porcine)  5,000 Units Subcutaneous 3 times per day    docusate sodium  100 mg Oral Daily    darbepoetin siddharth-polysorbate  40 mcg Intravenous Weekly - Monday    aspirin  81 mg Oral Daily    Calcium Acetate (Phos Binder)  1 capsule Oral TID WC    citalopram  40 mg Oral Daily    clopidogrel  75 mg Oral Daily    DULoxetine  30 mg Oral Daily    fluticasone  1 spray Each Nostril BID    hydrALAZINE  25 mg Oral 3 times per day    insulin glargine  70 Units Subcutaneous Nightly    insulin lispro  20 Units Subcutaneous TID WC    isosorbide mononitrate  30 mg Oral Daily    losartan  50 mg Oral Daily    pravastatin  80 mg Oral Nightly    pantoprazole  40 mg Oral QAM AC    QUEtiapine  50 mg Oral QPM    glycopyrrolate-formoterol  2 puff Inhalation BID    torsemide  100 mg Oral Daily    traZODone  150 mg Oral Nightly    tiZANidine  4 mg Oral TID    pregabalin  25 mg Oral TID    linaclotide  145 mcg Oral QAM AC    b complex-C-folic acid  1 capsule Oral Daily    sodium chloride flush  10 mL Intravenous 2 times per day    insulin lispro  0-12 Units Subcutaneous TID WC    insulin lispro  0-6 Units Subcutaneous Nightly    insulin regular  10 Units Intravenous Once    dextrose  25 g Intravenous Once    calcium gluconate-NaCl  1 g Intravenous Once    doxercalciferol  3.5 mcg Intravenous Q MWF     PRN Meds: albuterol, hydrOXYzine, oxyCODONE-acetaminophen, calcium carbonate, sodium chloride flush, promethazine **OR** ondansetron, polyethylene glycol, acetaminophen **OR** acetaminophen, glucose, dextrose, glucagon (rDNA), dextrose    No intake or output data in the 24 hours ending 01/04/21 0905    Physical Exam Performed:    BP (!) 149/74   Pulse 86   Temp 97.7 °F (36.5 °C) (Axillary)   Resp 18   Ht 5' 9\" (1.753 m)   Wt 266 lb 9.6 oz (120.9 kg)   SpO2 97%   BMI 39.37 kg/m²     General appearance: No apparent distress, appears stated age and cooperative. HEENT: Pupils equal, round, and reactive to light. Conjunctivae/corneas clear. Neck: Supple, with full range of motion. No jugular venous distention. Trachea midline. Respiratory:  Normal respiratory effort. Clear to auscultation, bilaterally without Rales/Wheezes/Rhonchi. Cardiovascular: Regular rate and rhythm with normal S1/S2 without murmurs, rubs or gallops. Abdomen: Soft, non-tender, non-distended with normal bowel sounds. Musculoskeletal: No clubbing, cyanosis or edema bilaterally. Full range of motion without deformity. Skin: Skin color, texture, turgor normal.  No rashes or lesions. Neurologic:  Neurovascularly intact without any focal sensory/motor deficits.  Cranial nerves: II-XII intact, grossly non-focal.  Psychiatric: Alert and oriented, thought content appropriate, normal insight  Capillary Refill: Brisk,< 3 seconds   Peripheral Pulses: +2 palpable, equal bilaterally       Labs:   Recent Labs     01/01/21  1112 01/02/21  0815   WBC 6.2 6.0   HGB 9.1* 9.0*   HCT 28.1* 27.1*    250     Recent Labs     01/01/21  1112 01/02/21  0815   * 131*   K 4.5 4.3   CL 90* 90*   CO2 28 27   BUN 57* 63*   CREATININE 6.0* 6.6*   CALCIUM 8.8 8.7     No results for input(s): AST, ALT, BILIDIR, BILITOT, ALKPHOS in the last 72 hours. No results for input(s): INR in the last 72 hours. No results for input(s): Boris Fling in the last 72 hours. Urinalysis:      Lab Results   Component Value Date    NITRU Negative 06/05/2020    WBCUA 10-20 06/05/2020    BACTERIA 2+ 06/05/2020    RBCUA 0-2 06/05/2020    BLOODU TRACE-INTACT 06/05/2020    SPECGRAV 1.020 06/05/2020    GLUCOSEU 250 06/05/2020       Radiology:  CTA ABDOMINAL AORTA W BILAT RUNOFF W CONTRAST   Final Result   Vascular-      Aortoiliac inflow disease. On the right, the iliac stents are distally   occluded. On the left there is moderate stenosis the proximal common iliac   artery with patent distal stents. Femoropopliteal disease, multifocal bilaterally. On the right there are   multiple mild-to-moderate stenoses and severe stenosis adductor canal and   very distal popliteal.  On the left, there are multiple mild-to-moderate   stenoses. There appears to be a focal moderate stenosis in the mid SFA,   immediately proximal to the overlapping stents. Trifurcation disease, multifocal bilaterally. On the right, there is primary   runoff. The posterior tibial.  On the left, there is three-vessel runoff,   although there appear to be focal high-grade stenoses in the proximal   anterior and posterior tibial arteries. Splenic 2 x 1 cm hypodensity, variant enhancement versus embolic infarction. This appears new since 12/07/2020. No acute inflammatory abnormalities identified in the abdomen or pelvis.          VL needs    JAY León - CNP

## 2021-01-04 NOTE — PROGRESS NOTES
Progress Note    HISTORY     CC:  Fall and weakness           We are following for ESRD       Subjective/   HPI:    Seen and examined at HD on 1/4     ROS:  Constitutional:  No fevers, No Chills, + weakness  Cardiovascular:  No palpations  Respiratory:  No wheezing, no cough  Skin:  No rash, no itching    Social Hx:  No family at bedside     Past Medical and Surgical History:  - Reviewed, no changes     EXAM       Objective/     Vitals:    01/04/21 0026 01/04/21 0530 01/04/21 0546 01/04/21 0759   BP:  125/82  (!) 149/74   Pulse:  81  86   Resp: 16 11  18   Temp:  97.9 °F (36.6 °C)  97.7 °F (36.5 °C)   TempSrc:  Oral  Axillary   SpO2:  95%  97%   Weight:   266 lb 9.6 oz (120.9 kg)    Height:         24HR INTAKE/OUTPUT:    No intake or output data in the 24 hours ending 01/04/21 0929  Constitutional:  NAD  Eyes:  Pupils reactive, sclera clear   Neck:  Normal thyroid, no masses   Cardiovascular:  Regular, no rub  Respiratory:  No distress, no wheezing  Psychiatry:  Flat mood/affect, alert  Abdomen: +bs, soft, nt, no masses   Musculoskeletal: No LE edema, no clubbing   Lymphatics:  No LAD in neck, no supraclavicular nodes   Access:  [facility]      MEDICAL DECISION MAKING       Data/  Recent Labs     01/01/21  1112 01/02/21  0815   WBC 6.2 6.0   HGB 9.1* 9.0*   HCT 28.1* 27.1*   MCV 90.7 89.0    250     Recent Labs     01/01/21  1112 01/02/21  0815   * 131*   K 4.5 4.3   CL 90* 90*   CO2 28 27   GLUCOSE 146* 143*   BUN 57* 63*   CREATININE 6.0* 6.6*   LABGLOM 10* 9*   GFRAA 12* 11*       Assessment/     ESRD:  On dialysis with a left TDC. Had a fistula but developed vascular access steal and failed PD. Has been declined for kidney transplant due to medical conditions      Weakness:  Seen by neurology at last admission. Planning for placement at nursing home but then went home after some improvement. Had MRI and LP for LE numbness.   Diagnosed with neuropathy     Anemia:  Drifting below target,    LE pressure ulcers:  Wound care following. Vascular surgery following.       Dizziness/weakness/fall:  - Worse with hyperkalemia. Has improved but legs remain weak     Hyperkalemia:  Due to ESRD and diet.   Resolved with dialysis     Plan/     HD on 1/4 per MWF schedule    Follow labs to be drawn on 1/4  Aranesp 40 mcg  with next dialysis from 1/4    Ok to dc from renal std pt when ok with others

## 2021-01-04 NOTE — PROGRESS NOTES
Multiple calls placed to HD RN no answer. Unable to determine if pt received his Aranesp and Hectorol. No report called after HD tx.

## 2021-01-05 VITALS
RESPIRATION RATE: 18 BRPM | BODY MASS INDEX: 37.71 KG/M2 | OXYGEN SATURATION: 96 % | DIASTOLIC BLOOD PRESSURE: 83 MMHG | WEIGHT: 254.63 LBS | TEMPERATURE: 97.4 F | HEART RATE: 72 BPM | SYSTOLIC BLOOD PRESSURE: 130 MMHG | HEIGHT: 69 IN

## 2021-01-05 LAB
GLUCOSE BLD-MCNC: 120 MG/DL (ref 70–99)
GLUCOSE BLD-MCNC: 133 MG/DL (ref 70–99)
LV EF: 55 %
LVEF MODALITY: NORMAL
PERFORMED ON: ABNORMAL
PERFORMED ON: ABNORMAL

## 2021-01-05 PROCEDURE — 2700000000 HC OXYGEN THERAPY PER DAY

## 2021-01-05 PROCEDURE — 2500000003 HC RX 250 WO HCPCS: Performed by: HOSPITALIST

## 2021-01-05 PROCEDURE — 6370000000 HC RX 637 (ALT 250 FOR IP): Performed by: HOSPITALIST

## 2021-01-05 PROCEDURE — 94660 CPAP INITIATION&MGMT: CPT

## 2021-01-05 PROCEDURE — 97110 THERAPEUTIC EXERCISES: CPT

## 2021-01-05 PROCEDURE — 6360000002 HC RX W HCPCS: Performed by: HOSPITALIST

## 2021-01-05 PROCEDURE — 6370000000 HC RX 637 (ALT 250 FOR IP): Performed by: NURSE PRACTITIONER

## 2021-01-05 PROCEDURE — 94761 N-INVAS EAR/PLS OXIMETRY MLT: CPT

## 2021-01-05 PROCEDURE — 2580000003 HC RX 258: Performed by: HOSPITALIST

## 2021-01-05 PROCEDURE — C8929 TTE W OR WO FOL WCON,DOPPLER: HCPCS

## 2021-01-05 RX ORDER — HYDROCODONE BITARTRATE AND ACETAMINOPHEN 7.5; 325 MG/1; MG/1
1 TABLET ORAL EVERY 6 HOURS PRN
Qty: 14 TABLET | Refills: 0 | Status: SHIPPED | OUTPATIENT
Start: 2021-01-05 | End: 2021-01-08

## 2021-01-05 RX ADMIN — OXYCODONE HYDROCHLORIDE AND ACETAMINOPHEN 1 TABLET: 7.5; 325 TABLET ORAL at 14:45

## 2021-01-05 RX ADMIN — TORSEMIDE 100 MG: 100 TABLET ORAL at 09:24

## 2021-01-05 RX ADMIN — HEPARIN SODIUM 5000 UNITS: 5000 INJECTION INTRAVENOUS; SUBCUTANEOUS at 13:32

## 2021-01-05 RX ADMIN — INSULIN LISPRO 20 UNITS: 100 INJECTION, SOLUTION INTRAVENOUS; SUBCUTANEOUS at 13:31

## 2021-01-05 RX ADMIN — HEPARIN SODIUM 5000 UNITS: 5000 INJECTION INTRAVENOUS; SUBCUTANEOUS at 05:56

## 2021-01-05 RX ADMIN — TIZANIDINE 4 MG: 4 TABLET ORAL at 13:31

## 2021-01-05 RX ADMIN — ASPIRIN 81 MG 81 MG: 81 TABLET ORAL at 09:25

## 2021-01-05 RX ADMIN — NEPHROCAP 1 MG: 1 CAP ORAL at 09:25

## 2021-01-05 RX ADMIN — PANTOPRAZOLE SODIUM 40 MG: 40 TABLET, DELAYED RELEASE ORAL at 05:54

## 2021-01-05 RX ADMIN — PREGABALIN 25 MG: 25 CAPSULE ORAL at 13:31

## 2021-01-05 RX ADMIN — TIZANIDINE 4 MG: 4 TABLET ORAL at 09:24

## 2021-01-05 RX ADMIN — ISOSORBIDE MONONITRATE 30 MG: 30 TABLET, EXTENDED RELEASE ORAL at 09:24

## 2021-01-05 RX ADMIN — INSULIN LISPRO 20 UNITS: 100 INJECTION, SOLUTION INTRAVENOUS; SUBCUTANEOUS at 09:27

## 2021-01-05 RX ADMIN — CALCIUM ACETATE 667 MG: 667 CAPSULE ORAL at 13:30

## 2021-01-05 RX ADMIN — DOCUSATE SODIUM 100 MG: 100 CAPSULE, LIQUID FILLED ORAL at 09:25

## 2021-01-05 RX ADMIN — DULOXETINE HYDROCHLORIDE 30 MG: 30 CAPSULE, DELAYED RELEASE ORAL at 09:24

## 2021-01-05 RX ADMIN — CALCIUM ACETATE 667 MG: 667 CAPSULE ORAL at 09:34

## 2021-01-05 RX ADMIN — HYDRALAZINE HYDROCHLORIDE 25 MG: 25 TABLET, FILM COATED ORAL at 05:56

## 2021-01-05 RX ADMIN — PREGABALIN 25 MG: 25 CAPSULE ORAL at 09:24

## 2021-01-05 RX ADMIN — CITALOPRAM HYDROBROMIDE 40 MG: 20 TABLET ORAL at 09:24

## 2021-01-05 RX ADMIN — LOSARTAN POTASSIUM 50 MG: 25 TABLET, FILM COATED ORAL at 09:25

## 2021-01-05 RX ADMIN — HYDRALAZINE HYDROCHLORIDE 25 MG: 25 TABLET, FILM COATED ORAL at 13:47

## 2021-01-05 RX ADMIN — Medication 10 ML: at 09:26

## 2021-01-05 RX ADMIN — CLOPIDOGREL BISULFATE 75 MG: 75 TABLET, FILM COATED ORAL at 09:24

## 2021-01-05 ASSESSMENT — PAIN DESCRIPTION - ORIENTATION: ORIENTATION: LOWER

## 2021-01-05 ASSESSMENT — PAIN SCALES - GENERAL
PAINLEVEL_OUTOF10: 10
PAINLEVEL_OUTOF10: 8

## 2021-01-05 ASSESSMENT — PAIN DESCRIPTION - LOCATION: LOCATION: BACK

## 2021-01-05 NOTE — CARE COORDINATION
CASE MANAGEMENT DISCHARGE SUMMARY      Discharge to: home with [facility]. Pt refused SNF    New Durable Medical Equipment ordered/agency: n/a    Transportation: family    Confirmed discharge plan with:     Patient: yes     Family, name and contact number: spouse      Facility/Agency, name:  [facility] faxed to [place] [address] agency per liaison, [place], [phone]     RN, name: [facility]    Note: Discharging nurse to complete CELINE, reconcile AVS, and place final copy with patient's discharge packet. RN to ensure that written prescriptions for  Level II medications are sent with patient as per protocol.     Ila Zabala RN

## 2021-01-05 NOTE — PROGRESS NOTES
Progress Note    HISTORY     CC:  Fall and weakness           We are following for ESRD       Subjective/   HPI:    HD on 1/4 w 200 ml UF, post wt 115. 5kg   Awaiting placement     ROS:  Constitutional:  No fevers, No Chills, + weakness  Cardiovascular:  No palpations  Respiratory:  No wheezing, no cough  Skin:  No rash, no itching    Social Hx:  No family at bedside     Past Medical and Surgical History:  - Reviewed, no changes     EXAM       Objective/     Vitals:    01/05/21 0815 01/05/21 1118 01/05/21 1134 01/05/21 1330   BP: 120/79  112/69 130/83   Pulse: 82  72    Resp: 16  18    Temp: 97.5 °F (36.4 °C)  97.4 °F (36.3 °C)    TempSrc: Oral  Oral    SpO2: 97% 96% 96%    Weight:       Height:         24HR INTAKE/OUTPUT:      Intake/Output Summary (Last 24 hours) at 1/5/2021 1554  Last data filed at 1/4/2021 2015  Gross per 24 hour   Intake 240 ml   Output --   Net 240 ml     Constitutional:  NAD  Eyes:  Pupils reactive, sclera clear   Neck:  Normal thyroid, no masses   Cardiovascular:  Regular, no rub  Respiratory:  No distress, no wheezing  Psychiatry:  Flat mood/affect, alert  Abdomen: +bs, soft, nt, no masses   Musculoskeletal: No LE edema, no clubbing   Lymphatics:  No LAD in neck, no supraclavicular nodes   Access:  [facility]      MEDICAL DECISION MAKING       Data/  Recent Labs     01/04/21  0907   WBC 7.7   HGB 9.4*   HCT 28.5*   MCV 89.6        Recent Labs     01/04/21  0907   *   K 4.5   CL 96*   CO2 26   GLUCOSE 168*   BUN 43*   CREATININE 5.7*   LABGLOM 10*   GFRAA 13*       Assessment/     ESRD:  On dialysis with a left TDC. Had a fistula but developed vascular access steal and failed PD. Has been declined for kidney transplant due to medical conditions      Weakness:  Seen by neurology at last admission. Planning for placement at nursing home but then went home after some improvement. Had MRI and LP for LE numbness.   Diagnosed with neuropathy     Anemia:  Drifting below target,    LE pressure ulcers:  Wound care following. Vascular surgery following.       Dizziness/weakness/fall:  - Worse with hyperkalemia. Has improved but legs remain weak     Hyperkalemia:  Due to ESRD and diet.   Resolved with dialysis     Plan/     HD per MWF schedule   Aranesp 40 mcg  with dialysis     Ok to dc from renal std pt when ok with others

## 2021-01-05 NOTE — DISCHARGE INSTR - COC
Continuity of Care Form    Patient Name: Dov Escoto   :  1970  MRN:  [id number]    Admit date:  2020  Discharge date:  21    Code Status Order: Full Code   Advance Directives:   Advance Care Flowsheet Documentation       Date/Time Healthcare Directive Type of Healthcare Directive Copy in [address] Agent's Name Healthcare Agent's Phone Number    [phone]  Yes, patient has an advance directive for healthcare treatment  Durable power of  for health care;Living will  Yes, copy in chart  Healthcare power of   Maliha Rivas  [phone]            Admitting Physician:  Judie Barbour MD  PCP: Migdalia Villar MD    [place]ing [facility] [facility] [place] [facility]Room[place] 1995/5776-65  Discharging Unit Phone Number: [phone]    Emergency Contact:   Extended Emergency Contact Information  Primary Emergency Contact: Crystal Ann  Address: [address] [place] [address] [address] [place] [address], [address] [facility] [facility] [address] of [address]  Phone: [phone]  Mobile Phone: [phone]  Relation: Spouse    Past Surgical History:  Past Surgical History:   Procedure Laterality Date    AORTIC VALVE REPLACEMENT N/A 10/15/2019    TRANSCATHETER AORTIC VALVE REPLACEMENT FEMORAL APPROACH [facility]    PERITONEAL DIALYSIS CATHETER REMOVAL performed by Christen Fine MD at [address] [place] [address]      WNL    CORONARY ANGIOPLASTY WITH STENT PLACEMENT  05/26/15    CYST REMOVAL  2013    EXCISION CYSTS, NECK X2 AND ABDOMINAL benign    DIAGNOSTIC CARDIAC CATH LAB PROCEDURE      DIALYSIS FISTULA CREATION Left 10/30/2017    LEFT BRACHIAL CEPHALIC FISTULA    DIALYSIS FISTULA CREATION Left 3/27/2019    LIGATION  AV FISTULA performed by Yon De Los Santos MD at [address] [place] [address], DIAGNOSTIC      OTHER SURGICAL HISTORY  2017 laparoscopic cholecystectomy with intraoperative cholangiogram    OTHER SURGICAL HISTORY  2018    PORT PLACEMENT  - vas cath    OTHER SURGICAL HISTORY Bilateral 06/26/2018    laprascopic peritoneal dialysis catheter placement    OTHER SURGICAL HISTORY Right 09/2018    peritoneal dialysis port placed on right side of abdomen    OTHER SURGICAL HISTORY  05/28/2019    PTA/Stenting R External Iliac artery    OR LAP INSERTION TUNNELED INTRAPERITONEAL CATHETER N/A 9/21/2018    LAPAROSCOPIC PERITONEAL DIALYSIS CATHETER REPLACEMENT performed by Jl Garza MD at [facility] ENDOSCOPY  01/06/2016    UPPER GASTROINTESTINAL ENDOSCOPY  01/29/2017    possible candida, otherwise normal appearing    VASCULAR SURGERY  aprx 2 years ago    2 stents placed, each side of groin       Immunization History:   Immunization History   Administered Date(s) Administered    Hepatitis B Adult (Engerix-B) 11/18/2017, 06/13/2018, 07/13/2018    Influenza Virus Vaccine 12/27/2012, 10/07/2014, 11/20/2015, 10/16/2019    Influenza, Intradermal, Quadrivalent, Preservative Free 11/16/2016    Influenza, MDCK Quadv, IM, PF (Flucelvax 4 yrs and older) 10/14/2017, 09/30/2020    Influenza,  Kocher, 6 mo and older, IM, PF (Flulaval, Fluarix) 09/15/2018    Influenza, Quadv, IM, PF (6 mo and older Fluzone, Flulaval, Fluarix, and 3 yrs and older Afluria) 10/16/2019    PPD Test 11/09/2017, 11/16/2017, 01/03/2019, 01/06/2020    Pneumococcal Conjugate 13-valent (Nwigckp39) 10/14/2017    Pneumococcal Polysaccharide (Zadcenjvl54) 10/07/2014, 11/19/2019    Tdap (Boostrix, Adacel) 02/13/2020    Zoster Recombinant (Shingrix) 02/13/2020       Active Problems:  Patient Active Problem List   Diagnosis Code    Acute respiratory failure with hypoxia and hypercapnia (HCC) J96.01, J96.02    Chronic dCHF (grade 2 LVDD) I50.9    Chronic obstructive pulmonary disease (HCC) J44.9    CAD (coronary artery disease) I25.10    PVD (peripheral vascular disease) (HCC) I73.9    Nonischemic cardiomyopathy (HCC) I42.8    Sleep apnea G47.30    Bicuspid aortic valve Q23.1    Bilateral hilar adenopathy syndrome R59.0    Claudication in peripheral vascular disease (HCC) I73.9    Essential hypertension I10    Diabetic neuropathy (HCC) E11.40    Type 2 diabetes, uncontrolled, with neuropathy (HCC) E11.40, E11.65    Passive smoke exposure Z77.22    Depression with anxiety F41.8    Hematoma T14. 8XXA    Pneumonia of right upper lobe due to infectious organism J18.9    DM (diabetes mellitus), secondary, uncontrolled, w/neurologic complic ([facility] [address] T33.72, D20.73    Hypertensive heart disease with congestive heart failure with preserved left ventricular function (HCC) I11.0, I50.30    CHF exacerbation ([facility]) I50.9    Chest pain R07.9    Coronary artery disease involving native coronary artery of native heart without angina pectoris I25.10    Obesity (BMI 30-39. 9) E66.9    ZAINAB on CPAP G47.33, Z99.89    Degeneration of lumbar or lumbosacral intervertebral disc M51.37    Lumbar radiculopathy M54.16    Lumbosacral spondylosis without myelopathy Y12.163    Biliary colic U91.87    Symptomatic cholelithiasis K80.20    Gastroparesis due to DM (HCC) E11.43, K31.84    Angina, class IV ([facility]) I20.9    Dyspnea R06.00    Elevated brain natriuretic peptide (BNP) level R79.89    Dyslipidemia E78.5    Respiratory distress R06.03    Hypoxia R09.02    Chest pressure R07.89    Hypertensive urgency I16.0    Acute on chronic combined systolic and diastolic heart failure (HCC) I50.43    Ischemic cardiomyopathy I25.5    Chronic renal failure, stage 5 (HCC) N18.5    Tobacco abuse Z72.0    CKD stage 4 due to type 2 diabetes mellitus (HCC) E11.22, N18.4    CVA (cerebral vascular accident) ([address] I63.9    Arterial ischemic stroke, ICA, right, acute ([facility] [address] I63.231    Type 2 diabetes mellitus without complication, without long-term current use of insulin ([facility]) E11.9    HTN (hypertension), benign I10    ZAINAB (obstructive sleep apnea) G47.33    Diarrhea R19.7    Pleural effusion J90    Chronic anemia D64.9    Nonrheumatic aortic valve stenosis I35.0    Hypervolemia E87.70    Hyperkalemia E87.5    Mucus plugging of bronchi J98.09    Hemodialysis-associated hypotension I95.3    Left arm pain M79.602    Dizziness R42    ESRD (end stage renal disease) on dialysis ([facility]) N18.6, Z99.2    Hypotension due to drugs I95.2    Acute diastolic CHF (congestive heart failure) ([facility]) I50.31    Neuromuscular disorder ([facility]) G70.9    Acute combined systolic and diastolic CHF, NYHA class 4 ([facility]) I50.41    Renovascular hypertension I15.0    Mixed hyperlipidemia E78.2    Cigarette nicotine dependence in remission F17.211    Acute respiratory failure ([facility]) J96.00    Pulmonary edema J81.1    Fluid overload Y71.22    Diastolic dysfunction E47.61    Anemia of chronic disease D63.8    SOB (shortness of breath) R06.02    Steal syndrome of dialysis vascular access ([facility]) T82.898A    Chronic, continuous use of opioids F11.90    Chronic bronchitis ([facility]) J42    Nasal congestion R09.81    Hypercholesteremia E78.00    Bradycardia R00.1    S/P TAVR (transcatheter aortic valve replacement) Z95.2    Syncope and collapse R55    Atrial fibrillation ([facility]) I48.91    Former smoker Z87.891    Generalized weakness R53.1    DKA, type 1, not at goal [facility]) E10.10    Bilateral leg weakness R29.898    GBS (Guillain-Ratcliff syndrome) ([facility]) G61.0    Sinus pause I45.5    Weakness of both lower extremities R29.898    Sinus bradycardia R00.1    Ataxia R27.0    Peripheral vascular occlusive disease ([facility]) I73.9       Isolation/Infection:   Isolation            No Isolation          Patient Infection Status       Infection Onset Added Last Indicated Last Indicated By Review Planned Expiration Resolved Resolved By    None active    Resolved    COVID-19 Rule Out 12/18/20 12/18/20 12/18/20 COVID-19 (Ordered)   12/18/20 Rule-Out Test Resulted    COVID-19 Rule Out 05/04/20 05/04/20 05/04/20 COVID-19 (Ordered)   05/04/20 Rule-Out Test Resulted            Nurse Assessment:  Last Vital Signs: /69   Pulse 72   Temp 97.4 °F (36.3 °C) (Oral)   Resp 18   Ht 5' 9\" (1.753 m)   Wt 254 lb 10.1 oz (115.5 kg)   SpO2 96%   BMI 37.60 kg/m²     Last documented pain score (0-10 scale): Pain Level: 9  Last Weight:   Wt Readings from Last 1 Encounters:   01/04/21 254 lb 10.1 oz (115.5 kg)     Mental Status:  oriented, alert, coherent, logical, thought processes intact and able to concentrate and follow conversation    IV Access:  - None    Nursing Mobility/ADLs:  Walking   {CHP DME GXXZ:347463414}  Transfer  {CHP DME QQZW:103090410}  Bathing  {CHP DME XNBY:655157842}  Dressing  {CHP DME TKFA:861796361}  Toileting  {CHP DME XJTI:366380230}  Feeding  {CHP DME KHEU:136230100}  Med Admin  {CHP DME TBZC:457934015}  Med Delivery   whole    Wound Care Documentation and Therapy:  Wound 12/31/20 Foot Left (Active)   Wound Image   12/31/20 1720   Wound Etiology Traumatic 01/02/21 0742   Dressing Status Dry; Intact 12/31/20 1720   Dressing/Treatment Betadine swabs/povidone iodine 01/04/21 0800   Dressing Change Due 01/04/21 01/04/21 0800   Wound Length (cm) 0.7 cm 12/31/20 1720   Wound Width (cm) 1.7 cm 12/31/20 1720   Wound Depth (cm) 0 cm 12/31/20 1720   Wound Surface Area (cm^2) 1.19 cm^2 12/31/20 1720   Wound Volume (cm^3) 0 cm^3 12/31/20 1720   Wound Assessment Eschar dry 01/04/21 2022   Drainage Amount None 12/31/20 1720   Odor None 12/31/20 1720   Liberty-wound Assessment Dry/flaky 12/31/20 1720   Margins Attached edges; Defined edges 12/31/20 1720   Number of days: 4       Wound 12/31/20 Foot Right (Active)   Wound Image   12/31/20 1720   Wound Etiology Traumatic 01/02/21 0742   Dressing Status Dry; Intact 12/31/20 1720   Dressing/Treatment Betadine swabs/povidone iodine 01/04/21 0800   Dressing Change Due 01/04/21 01/04/21 0800   Wound Length (cm) 1 cm 12/31/20 1720   Wound Width (cm) 3 cm 12/31/20 1720   Wound Depth (cm) 0 cm 12/31/20 1720   Wound Surface Area (cm^2) 3 cm^2 12/31/20 1720   Wound Volume (cm^3) 0 cm^3 12/31/20 1720   Wound Assessment Eschar dry 01/04/21 2022   Drainage Amount None 12/31/20 1720   Odor None 12/31/20 1720   Liberty-wound Assessment Dry/flaky; Blanchable erythema 12/31/20 1720   Margins Attached edges; Defined edges 12/31/20 1720   Number of days: 4        Elimination:  Continence:   · Bowel: Yes  · Bladder: Yes  Urinary Catheter: None   Colostomy/Ileostomy/Ileal Conduit: No       Date of Last BM: 1/3/21    Intake/Output Summary (Last 24 hours) at 1/5/2021 1314  Last data filed at 1/4/2021 2015  Gross per 24 hour   Intake 480 ml   Output --   Net 480 ml     I/O last 3 completed shifts: In: 200 [P.O.:480]  Out: 2500     Safety Concerns:     History of Falls (last 30 days)    Impairments/Disabilities:      None    Nutrition Therapy:  Current Nutrition Therapy:   - Oral Diet:  Renal    Routes of Feeding: Oral  Liquids: No Restrictions  Daily Fluid Restriction: no  Last Modified Barium Swallow with Video (Video Swallowing Test): not done    Treatments at the Time of Hospital Discharge:   Respiratory Treatments: Albuterol, Bevespi  Oxygen Therapy:  is not on home oxygen therapy.   Ventilator:    - No ventilator support    Rehab Therapies: Physical Therapy and Occupational Therapy  Weight Bearing Status/Restrictions: No weight bearing restirctions  Other Medical Equipment (for information only, NOT a DME order):  None  Other Treatments: HOME HEALTH CARE: LEVEL 3 [address] to establish plan of care for patient over 60 day period   [address] Initial home SN evaluation visit to occur within 24-48 hours for:  1)  medication management  2)  VS and clinical assessment  3)  S&S chronic disease exacerbation education + when to contact MD/NP  4)  care coordination   Medication Reconciliation during 1st SN visit   PT/OT/Speech    Evaluations in home within 24-48 hours of discharge to include DME and home safety   [employer] Frontload therapy 5 days, then 3x a week    OT to evaluate if patient has [address] needs for personal care     evaluation within 24-48 hours to evaluate resources & insurance for potential AL, [place], LTC, and Medicaid options    Palliative Care referral within 5 days of hospital discharge   PCP Visit scheduled within 3 - 7 days of hospital discharge    [address] (If patient is agreeable and meets guidelines)      Patient's personal belongings (please select all that are sent with patient):  None    RN SIGNATURE:  Electronically signed by Figueroa Ellsworth on 1/5/21 at 4:22 PM EST    CASE MANAGEMENT/SOCIAL WORK SECTION    Inpatient Status Date: ***    Readmission Risk Assessment Score:  Readmission Risk              Risk of Unplanned Readmission:        62            Discharging to Facility/ Agency    Name:  [facility] care     Address: [address] [place] [address], [address] [place] [address]   Phone: [phone]   Fax: [phone]      Dialysis Facility (if applicable)   · Name:  · Address:  · Dialysis Schedule:  · Phone:  · Fax:    / signature: {Esignature:[id number]}    PHYSICIAN SECTION    Prognosis: Fair    Condition at Discharge: Stable    Rehab Potential (if transferring to Rehab): {Prognosis:3491144758}    Recommended Labs or Other Treatments After Discharge:   Recommended Follow-up, Labs or Other Treatments After Discharge:    Home care PT/OT/SN               Physician Certification: I certify the above information and transfer of Dov Escoto  is necessary for the continuing treatment of the diagnosis listed and that he requires Home Care for less 30 days.      Update Admission H&P:     PHYSICIAN SIGNATURE:  Electronically signed by JAY Robison CNP on 1/5/21 at 2:59 PM EST

## 2021-01-05 NOTE — PROGRESS NOTES
01/05/21 0315   NIV Type   Skin Protection for O2 Device Yes   Location Nose   Equipment Type v60   Mode CPAP   Mask Type Full face mask   Mask Size Large   Settings/Measurements   CPAP/EPAP 5 cmH2O   Resp 17   O2 Flow Rate (L/min) 25 L/min   Vt Exhaled 569 mL   Minute Volume 9.5 Liters   Mask Leak (lpm) 25 lpm   Comfort Level Good   Using Accessory Muscles No   SpO2 97   Alarm Settings   Alarms On Y   Press Low Alarm 4 cmH2O   High Pressure Alarm 25 cmH2O   Apnea (secs) 20 secs   Resp Rate Low Alarm 6   High Respiratory Rate 40 br/min

## 2021-01-05 NOTE — PROGRESS NOTES
Occupational Therapy  Facility/Department: [facility] B[facility] - MED SURG  Daily Treatment Note  NAME: Giana Coles  : 1970  MRN: [id number]    Date of Service: 2021    Discharge Recommendations:  [address]     If pt discharges prior to next session, this note will serve as discharge summary. See case management note for discharge disposition. Assessment   Performance deficits / Impairments: Decreased functional mobility ; Decreased ADL status; Decreased safe awareness;Decreased balance;Decreased endurance  Assessment: Pt pleasant and agreeable on 2nd attempt, was eating breakfast on 1st attempt. Bed mobility Mod I and sitting balance independent. Pt tolerates EOB for eating and for UE ex. Pt declines mobility, but states he has been taking self to restroom. limited session d/t echo tech arriving for testing. OT Education: OT Role;Plan of Care;Transfer Training;ADL Adaptive Strategies  Patient Education: disease specific ed:  role of OT, safe mobility with walker, need to call for assistance for transfers  Activity Tolerance  Activity Tolerance: Patient Tolerated treatment well  Safety Devices  Safety Devices in place: Yes  Type of devices: Chair alarm in place;Call light within reach;Nurse notified; Left in chair;Gait belt; All fall risk precautions in place         Patient Diagnosis(es): The primary encounter diagnosis was Fall, initial encounter. A diagnosis of Diabetic polyneuropathy associated with type 2 diabetes mellitus ([address] was also pertinent to this visit.       has a past medical history of Ambulatory dysfunction, Aortic stenosis, Arthritis, Asthma, Bilateral hilar adenopathy syndrome, CAD (coronary artery disease), Cardiomyopathy ([address] CHF (congestive heart failure) ([address] Chronic pain, COPD (chronic obstructive pulmonary disease) ([address] Depression, Diabetes mellitus ([address] Difficult intravenous access, Emphysema of lung ([address] ESRD (end stage renal disease) on dialysis ([facility] Fear of needles, Gastric ulcer, GERD (gastroesophageal reflux disease), Heart valve problem, Hemodialysis patient ([facility] History of spinal fracture, Hx of blood clots, Hyperlipidemia, Hypertension, MI (myocardial infarction) ([facility] Neuromuscular disorder ([facility] [facility] Numbness and tingling in left arm, Pneumonia, PONV (postoperative nausea and vomiting), Prolonged emergence from general anesthesia, Sleep apnea, Stroke ([facility] TIA (transient ischemic attack), and Unspecified diseases of blood and blood-forming organs. has a past surgical history that includes Tonsillectomy; cyst removal (08/14/2013); Colonoscopy; Coronary angioplasty with stent (05/26/15); vascular surgery (aprx 2 years ago); Colonoscopy (2/29/2015); Upper gastrointestinal endoscopy (01/06/2016); Upper gastrointestinal endoscopy (01/29/2017); other surgical history (02/01/2017); Dialysis fistula creation (Left, 10/30/2017); Diagnostic Cardiac Cath Lab Procedure; other surgical history (2018); other surgical history (Bilateral, 06/26/2018); pr lap insertion tunneled intraperitoneal catheter (N/A, 9/21/2018); other surgical history (Right, 09/2018); Dialysis fistula creation (Left, 3/27/2019); other surgical history (05/28/2019); Endoscopy, colon, diagnostic; Catheter Removal (Right, 7/2/2019); and Aortic valve replacement (N/A, 10/15/2019). Restrictions  Restrictions/Precautions  Restrictions/Precautions: Fall Risk  Position Activity Restriction  Other position/activity restrictions: tele  Subjective   General  Chart Reviewed: Yes  Patient assessed for rehabilitation services?: Yes  Family / Caregiver Present: No  Referring Practitioner: Claudia Bassett MD  Diagnosis: LE weakness, fall  Subjective  Subjective: Pt reports he is tired, has not had any sleep  General Comment  Comments: RN approved therapy.   Pain Assessment  Pain Level: 9  Pain Type: Chronic pain  Pain Location: Back  Pain Orientation: Lower  Pain Descriptors: Aching;Discomfort; Constant  Vital Signs  Patient Currently in Pain: Yes   Orientation  Orientation  Overall Orientation Status: Within Normal Limits  Objective    ADL  Feeding: Independent  Additional Comments: declines ADL tasks        Balance  Sitting Balance: Independent  Standing Balance: Contact guard assistance(RW)  Functional Mobility  Functional Mobility Comments: declined mobility  Bed mobility  Supine to Sit: Modified independent(bed rails)  Transfers  Sit to stand: Contact guard assistance  Stand to sit: Contact guard assistance                       Cognition  Arousal/Alertness: Appropriate responses to stimuli  Following Commands: Follows one step commands consistently  Attention Span: Attends with cues to redirect  Safety Judgement: Decreased awareness of need for safety;Decreased awareness of need for assistance  Problem Solving: Decreased awareness of errors                    Type of ROM/Therapeutic Exercise  Type of ROM/Therapeutic Exercise: AROM  Comment: seated  Exercises  Shoulder Flexion: 10x  Horizontal ABduction: 10x  Horizontal ADduction: 10x  Elbow Flexion: 10x  Elbow Extension: 10x  Grasp/Release: 10x                    Plan   Plan  Times per week: 3-5x  Current Treatment Recommendations: Self-Care / ADL, Safety Education & Training, Functional Mobility Training, Balance Training, Endurance Training, Equipment Evaluation, Education, & procurement    AM-PAC Score             Goals  Short term goals  Time Frame for Short term goals: 1 week (1/6) unless noted  Short term goal 1: Perform functional transfer with mod A with AD-GOAL MET 1/4. New goal:  SBA for functional transfers with walker  Short term goal 2: Perform toileting with min A-ongoing 1/4  Short term goal 3: Perform UE exer 15x each to improve endurance by 1/4 (extend to 1/8)-ongoing, 10x each 1/4  Short term goal 4: Stand > 1 min with walker and mod A for ADL tasks-GOAL MET 1/4, CGA with walker x2 min.  New goal: Stand at sink for 2 ADL tasks with SBA  Patient Goals   Patient goals : \"be able to walk\"       Therapy Time   Individual Concurrent Group Co-treatment   Time In 7531         Time Out 1012         Minutes 14         Timed Code Treatment Minutes: Lara 60, OT

## 2021-01-05 NOTE — CARE COORDINATION
[facility]    DC order noted, all docs needed have been faxed to [facility] for home care services.     Home care to see patient within 24-48 hrs    Annabelle Rodríguez RN, BSN CTN  [facility] [phone]

## 2021-01-05 NOTE — PROGRESS NOTES
Pt d/c'd home with [facility]. Removed peripehral IV and stopped bleeding. Catheter intact. Pt tolerated well. No redness noted at site. Notified [facility] and removed tele box. Reviewed d/c instructions, home meds, and  f/u information utilizing teach-back method. Scripts for [address] given to patient. Advised pt to  medication from preferred pharmacy. Patient verbalized understanding. Pt escorted off of unit via wheelchair by PCA with all d/c paperwork, prescriptions, and belongings in hand.

## 2021-01-06 ENCOUNTER — CARE COORDINATION (OUTPATIENT)
Dept: CASE MANAGEMENT | Age: 51
End: 2021-01-06

## 2021-01-06 NOTE — CARE COORDINATION
[facility] Transitions Initial Follow Up Call    Call within 2 business days of discharge: Yes    Patient: Grover Worley Patient : 1970   MRN: [id number]  Reason for Admission: weakness BLE  Discharge Date: 21 RARS: Readmission Risk Score: 62      Last Discharge [facility]       Complaint Diagnosis Description Type Department Provider    20 Fall Fall, initial encounter . .. ED to Hosp-Admission (Discharged) (ADMITTED) Jennifer Becerra MD; Genie Kraft. .. Attempted to reach patient via phone for initial post hospital transition call. VM left stating purpose of call along with my contact information requesting a return call.     Care Transitions 24 Hour Call    Care Transitions Interventions         Follow Up  Future Appointments   Date Time Provider Nelly Darby   2021  1:15 PM JAY Byrnes - CNP Nany Deng RN

## 2021-01-07 ENCOUNTER — TELEPHONE (OUTPATIENT)
Dept: FAMILY MEDICINE CLINIC | Age: 51
End: 2021-01-07

## 2021-01-07 ENCOUNTER — CARE COORDINATION (OUTPATIENT)
Dept: CASE MANAGEMENT | Age: 51
End: 2021-01-07

## 2021-01-07 DIAGNOSIS — I25.10 CORONARY ARTERY DISEASE INVOLVING NATIVE HEART WITHOUT ANGINA PECTORIS, UNSPECIFIED VESSEL OR LESION TYPE: ICD-10-CM

## 2021-01-07 RX ORDER — NITROGLYCERIN 0.4 MG/1
TABLET SUBLINGUAL
Qty: 25 TABLET | Refills: 10 | Status: SHIPPED | OUTPATIENT
Start: 2021-01-07

## 2021-01-07 NOTE — TELEPHONE ENCOUNTER
Pt  called in to ask can we call pt in a regular glucometer  Into his pharmacy because he is not able to get one because insurance keeps denying a freestyle meter

## 2021-01-07 NOTE — CARE COORDINATION
[facility] Transitions Initial Follow Up Call     Call within 2 business days of discharge: Yes    Patient: Alycia Mcleod Patient : 1970   MRN: [id number]  Reason for Admission: BLE weakness  Discharge Date: 21 RARS: Readmission Risk Score: 62      Last Discharge [facility]       Complaint Diagnosis Description Type Department Provider    20 Fall Fall, initial encounter . .. ED to Hosp-Admission (Discharged) (ADMITTED) Kandi Lamb MD; Sushila Kim. .. Spoke with: Ester Solorio: A      Challenges to be reviewed by the provider   Additional needs identified to be addressed with provider No  none       Method of communication with provider : phone    Advance Care Planning:   Does patient have an Advance Directive:  not on file. Was this a readmission? No  Patients top risk factors for readmission: medical condition    Care Transition Nurse (CTN) contacted the patient by telephone to perform post hospital discharge assessment. Verified name and  with patient as identifiers. Provided introduction to self, and explanation of the CTN role. CTN reviewed discharge instructions, medical action plan and red flags with patient who verbalized understanding. Patient given an opportunity to ask questions and does not have any further questions or concerns at this time. Were discharge instructions available to patient? Yes. Reviewed appropriate site of care based on symptoms and resources available to patient including: PCP. The patient agrees to contact the PCP office for questions related to their healthcare. Medication reconciliation was performed with patient refused, who verbalizes understanding of administration of home medications. Advised obtaining a 90-day supply of all daily and as-needed medications. Discussed follow-up appointments. If no appointment was previously scheduled, appointment scheduling offered: Yes.  Is follow up

## 2021-01-08 RX ORDER — BLOOD-GLUCOSE METER
1 KIT MISCELLANEOUS DAILY
Qty: 1 KIT | Refills: 0 | Status: ON HOLD | OUTPATIENT
Start: 2021-01-08 | End: 2022-01-17 | Stop reason: HOSPADM

## 2021-01-08 RX ORDER — BLOOD SUGAR DIAGNOSTIC
1 STRIP MISCELLANEOUS
Qty: 100 EACH | Refills: 3 | Status: SHIPPED | OUTPATIENT
Start: 2021-01-08 | End: 2022-02-10

## 2021-01-11 PROCEDURE — 93272 ECG/REVIEW INTERPRET ONLY: CPT | Performed by: INTERNAL MEDICINE

## 2021-01-12 ENCOUNTER — TELEPHONE (OUTPATIENT)
Dept: FAMILY MEDICINE CLINIC | Age: 51
End: 2021-01-12

## 2021-01-12 NOTE — TELEPHONE ENCOUNTER
Moriah Joaquin from [facility] is calling to state the patient fell out of bed last night. There is a bump and a red spot on his forehead. The left wrist is giving him pain and he cannot make a full fist. The patient stated his right knee is hurting and neck is sore. Moriah Joaquin would like to request occupational therapy one time a week for four weeks.      Moriah Joaquin [phone]

## 2021-01-12 NOTE — TELEPHONE ENCOUNTER
Call from Devan Melgoza requesting verbal  PT, OT and nursing orders.       Call [place] Do at [phone]

## 2021-01-14 ENCOUNTER — CARE COORDINATION (OUTPATIENT)
Dept: CASE MANAGEMENT | Age: 51
End: 2021-01-14

## 2021-01-14 NOTE — CARE COORDINATION
[facility] Transitions Follow Up Call    2021    Patient: Siria Salvage  Patient : 1970   MRN: [id number]  Reason for Admission: fall  Discharge Date: 21 RARS: Readmission Risk Score: 57         Attempted to reach patient via phone for transition call. VM left stating purpose of call along with my contact information requesting a return call. Care Transitions Subsequent and Final Call    Subsequent and Final Calls  Care Transitions Interventions  Other Interventions:            Follow Up  Future Appointments   Date Time Provider Nelly Darby   2021  1:15 PM JAY Hackett - ILAN Gallagher RN

## 2021-01-19 ENCOUNTER — TELEPHONE (OUTPATIENT)
Dept: FAMILY MEDICINE CLINIC | Age: 51
End: 2021-01-19
Payer: COMMERCIAL

## 2021-01-19 ENCOUNTER — CARE COORDINATION (OUTPATIENT)
Dept: CASE MANAGEMENT | Age: 51
End: 2021-01-19

## 2021-01-19 DIAGNOSIS — I45.5 SINUS PAUSE: ICD-10-CM

## 2021-01-19 DIAGNOSIS — E11.42 TYPE 2 DIABETES MELLITUS WITH POLYNEUROPATHY (HCC): Primary | ICD-10-CM

## 2021-01-19 PROCEDURE — G0180 MD CERTIFICATION HHA PATIENT: HCPCS | Performed by: FAMILY MEDICINE

## 2021-01-19 NOTE — CARE COORDINATION
[facility] Transitions Follow Up Call    2021    Patient: Karen Eaton  Patient : 1970   MRN: [id number]  Reason for Admission: weakness BLE  Discharge Date: 21 RARS: Readmission Risk Score: 62         Spoke with: Karen Eaton    Patient answered call and verified . Patient agreeable to follow up call. Patient feeling bad today and continues to have bilateral lower extremity weakness. Patient not sleeping well and fell out of bed last week. Patient injured wrist, nose, and \"knot on my head\", but did not get further evaluation after the fall. Patient confirmed that he continues to have home care therapy. Patient states that therapist was at his home for visit and CTN offered to call back later this week. Patient agreeable and CTN rescheduled call. Denies any acute needs at present time. Agreeable to f/u calls. Educated on the use of urgent care or physicians 24 hr access line if assistance is needed after hours. Care Transitions Subsequent and Final Call    Subsequent and Final Calls  Do you have any ongoing symptoms?: No  Have your medications changed?: No  Do you have any questions related to your medications?: No  Do you currently have any active services?: Yes  Are you currently active with any services?: Home Health  Do you have any needs or concerns that I can assist you with?: No  Identified Barriers: None  Care Transitions Interventions  Other Interventions:            Follow Up  Future Appointments   Date Time Provider Nelly Darby   2021  1:15 PM JAY Rogel - ILAN Unger RN

## 2021-01-21 ENCOUNTER — TELEPHONE (OUTPATIENT)
Dept: CARDIOLOGY CLINIC | Age: 51
End: 2021-01-21

## 2021-01-21 NOTE — TELEPHONE ENCOUNTER
Please let him know that heart monitor did not show significant arrhythmia. Does have lower HR at night which can be partly due to ZAINAB. Continue to follow with pulmonary to see if CPAP can be improved. Thanks!

## 2021-01-22 ENCOUNTER — CARE COORDINATION (OUTPATIENT)
Dept: CASE MANAGEMENT | Age: 51
End: 2021-01-22

## 2021-01-22 NOTE — CARE COORDINATION
[facility] Transitions Follow Up Call    2021    Patient: Candace Garcia  Patient : 1970   MRN: [id number]  Reason for Admission: BLE weakness  Discharge Date: 21 RARS: Readmission Risk Score: 57         Spoke with: Candace Garcia    Patient answered call and verified . Patient pleasant, but worried about his wife. Patient states that his wife was admitted to ICU after she woke up with swollen lips and had to be intubated. Patient states that he is upset that he can't go to the hospital and see her due to the covid restrictions. Patient continues to have weakness in BLE and had another fall this morning. Patient denied any injury and PCP office is aware of the fall. Patient denied any medication changes. Patient was not able to go to dialysis as scheduled today. Patient states that due to the stress of his wife, he did not sleep well and was unable to make his scheduled time. CTN educated patient on importance of dialysis treatment and schedule and he states understanding. Denies any acute needs at present time. Agreeable to f/u calls. Educated on the use of urgent care or physicians 24 hr access line if assistance is needed after hours. Care Transitions Subsequent and Final Call    Subsequent and Final Calls  Do you have any ongoing symptoms?: No  Have your medications changed?: No  Do you have any questions related to your medications?: No  Do you currently have any active services?: Yes  Are you currently active with any services?: Home Health  Do you have any needs or concerns that I can assist you with?: No  Identified Barriers: None  Care Transitions Interventions  Other Interventions:            Follow Up  Future Appointments   Date Time Provider Nelly Darby   2021  1:15 PM Gabrielle Ayon, APRN - ILAN Olson RN

## 2021-01-26 ENCOUNTER — TELEPHONE (OUTPATIENT)
Dept: FAMILY MEDICINE CLINIC | Age: 51
End: 2021-01-26

## 2021-01-26 NOTE — TELEPHONE ENCOUNTER
Caller is reporting a fall for patient. Fall was this morning. Lower back is hurting  ore than usual.  No other injuries. BP was 187/92 when home health came to home today. Patient did not take his mediations today, only his pain pill. Please call if any questions.

## 2021-01-27 ENCOUNTER — NURSE TRIAGE (OUTPATIENT)
Dept: OTHER | Facility: CLINIC | Age: 51
End: 2021-01-27

## 2021-01-27 NOTE — TELEPHONE ENCOUNTER
Patient reports great toe and 5th toe on right foot is turning black. Patient reports this has been worsening of the last week or so. Patient reports that both toes had a small sore on them and they were bright red and now they have turned black. Patient reports that both of the toes are numb but the great toe is throbbing. Patient reports that he has a hx of diabetes. Patient reports pain is a 6/10. Patient denies red streaks. Reason for Disposition   SEVERE pain    Answer Assessment - Initial Assessment Questions  1. MECHANISM: \"How did the injury happen? \"       Not sure    2. ONSET: \"When did the injury happen? \" (Minutes or hours ago)       Been going on for about 1 week. 3. LOCATION: \"What part of the toe is injured? \" \"Is the nail damaged? \"       Right great toe and 5th toe. 4. APPEARANCE of TOE INJURY: \"What does the injury look like? \"       Turning black. 5. SEVERITY: \"Can you use the foot normally? \" \"Can you walk? \"       Yes but hurts. 6. SIZE: For cuts, bruises, or swelling, ask: \"How large is it? \" (e.g., inches or centimeters;  entire toe)       N/A    7. PAIN: \"Is there pain? \" If so, ask: \"How bad is the pain? \"   (e.g., Scale 1-10; or mild, moderate, severe)      6/10.    8. TETANUS: For any breaks in the skin, ask: \"When was the last tetanus booster? \"      Unsure    9. DIABETES: \"Do you have a history of diabetes or poor circulation in the feet? \"      Yes    10. OTHER SYMPTOMS: \"Do you have any other symptoms? \"         Numbness and throbbing pain. 11. PREGNANCY: \"Is there any chance you are pregnant? \" \"When was your last menstrual period? \"        N/A    Answer Assessment - Initial Assessment Questions  1. SYMPTOM: \"What's the main symptom you're concerned about? \" (e.g., rash, sore, callus, drainage, numbness)      See above. 2. LOCATION: \"Where is the  above located? \" (e.g., foot/toe, top/bottom, left/right)      See above. 3. ONSET: \"When did the  Above  start? \"      See above.    4. PAIN: \"Is there any pain? \" If so, ask: \"How bad is it? \" (Scale: 1-10; mild, moderate, severe)      See above. 5. CAUSE: \"What do you think is causing the symptoms? \"      Unsure    6. OTHER SYMPTOMS: \"Do you have any other symptoms? \" (e.g., fever, weakness)      See above. 7. PREGNANCY: \"Is there any chance you are pregnant? \" \"When was your last menstrual period? \"      N/A    Protocols used: DIABETES - FOOT PROBLEMS AND QUESTIONS-ADULT-, TOE [facility]    Patient called [facility])  with red flag complaint. Brief description of triage: see above. Triage indicates for patient to be seen in ER for evaluation. Care advice provided, patient verbalizes understanding; denies any other questions or concerns; instructed to call back for any new or worsening symptoms. Attention Provider: Thank you for allowing me to participate in the care of your patient. The patient was connected to triage in response to information provided to the [facility]. Please do not respond through this encounter as the response is not directed to a shared pool.

## 2021-01-28 ENCOUNTER — CARE COORDINATION (OUTPATIENT)
Dept: CASE MANAGEMENT | Age: 51
End: 2021-01-28

## 2021-01-28 ENCOUNTER — TELEPHONE (OUTPATIENT)
Dept: FAMILY MEDICINE CLINIC | Age: 51
End: 2021-01-28

## 2021-01-28 NOTE — TELEPHONE ENCOUNTER
Call from Home care agency stating patient had another fall last night. He also stated he has diarrhea and vomiting which started last night too. Please call if any questions.

## 2021-01-28 NOTE — CARE COORDINATION
[facility] Transitions Follow Up Call    2021    Patient: Giana Coles  Patient : 1970   MRN: [id number]  Reason for Admission: weakness of BLE  Discharge Date: 21 RARS: Readmission Risk Score: 62         Spoke with: Giana Coles    Patient answered call and verified . Patient pleasant and agreeable to follow up call. Patient states that he had another fall, but denied any injuries. Patient states that he has called his PCP office because his big and little toe are swollen and red. Patient was instructed to go to the ER for evaluation and plans on going this evening or tomorrow. CTN informed patient on the importance of evaluation of toes due to his diabetes and neuropathy and possibility of worsening. Patient states understanding and aware of importance. Patient denied any medication changes and confirmed that he would get his toes evaluated. Denies any acute needs at present time. Agreeable to f/u calls. Educated on the use of urgent care or physicians 24 hr access line if assistance is needed after hours. Care Transitions Subsequent and Final Call    Subsequent and Final Calls  Do you have any ongoing symptoms?: No  Have your medications changed?: No  Do you have any questions related to your medications?: No  Do you currently have any active services?: Yes  Are you currently active with any services?: Home Health  Do you have any needs or concerns that I can assist you with?: No  Identified Barriers: None  Care Transitions Interventions  Other Interventions:            Follow Up  Future Appointments   Date Time Provider Nelly Darby   2021  1:15 PM JAY Rosas - ILAN Estrada RN

## 2021-01-29 ENCOUNTER — APPOINTMENT (OUTPATIENT)
Dept: GENERAL RADIOLOGY | Age: 51
DRG: 252 | End: 2021-01-29
Payer: COMMERCIAL

## 2021-01-29 ENCOUNTER — HOSPITAL ENCOUNTER (INPATIENT)
Age: 51
LOS: 5 days | Discharge: HOME HEALTH | DRG: 252 | End: 2021-02-03
Attending: EMERGENCY MEDICINE | Admitting: INTERNAL MEDICINE
Payer: COMMERCIAL

## 2021-01-29 DIAGNOSIS — Z79.4 INSULIN DEPENDENT TYPE 2 DIABETES MELLITUS, CONTROLLED (HCC): ICD-10-CM

## 2021-01-29 DIAGNOSIS — Z99.2 END-STAGE RENAL DISEASE ON HEMODIALYSIS (HCC): ICD-10-CM

## 2021-01-29 DIAGNOSIS — L03.115 CELLULITIS OF RIGHT FOOT: Primary | ICD-10-CM

## 2021-01-29 DIAGNOSIS — E11.9 INSULIN DEPENDENT TYPE 2 DIABETES MELLITUS, CONTROLLED (HCC): ICD-10-CM

## 2021-01-29 DIAGNOSIS — N18.6 END-STAGE RENAL DISEASE ON HEMODIALYSIS (HCC): ICD-10-CM

## 2021-01-29 LAB
ANION GAP SERPL CALCULATED.3IONS-SCNC: 13 MMOL/L (ref 3–16)
BASOPHILS ABSOLUTE: 0.1 K/UL (ref 0–0.2)
BASOPHILS RELATIVE PERCENT: 0.5 %
BUN BLDV-MCNC: 67 MG/DL (ref 7–20)
C-REACTIVE PROTEIN: 26 MG/L (ref 0–5.1)
CALCIUM SERPL-MCNC: 9.3 MG/DL (ref 8.3–10.6)
CHLORIDE BLD-SCNC: 99 MMOL/L (ref 99–110)
CO2: 25 MMOL/L (ref 21–32)
CREAT SERPL-MCNC: 5.4 MG/DL (ref 0.9–1.3)
EOSINOPHILS ABSOLUTE: 0.3 K/UL (ref 0–0.6)
EOSINOPHILS RELATIVE PERCENT: 2.8 %
GFR AFRICAN AMERICAN: 14
GFR NON-AFRICAN AMERICAN: 11
GLUCOSE BLD-MCNC: 163 MG/DL (ref 70–99)
GLUCOSE BLD-MCNC: 180 MG/DL (ref 70–99)
GLUCOSE BLD-MCNC: 188 MG/DL (ref 70–99)
HCT VFR BLD CALC: 32.8 % (ref 40.5–52.5)
HEMOGLOBIN: 10.6 G/DL (ref 13.5–17.5)
LACTIC ACID: 0.8 MMOL/L (ref 0.4–2)
LYMPHOCYTES ABSOLUTE: 0.9 K/UL (ref 1–5.1)
LYMPHOCYTES RELATIVE PERCENT: 8.3 %
MCH RBC QN AUTO: 28.8 PG (ref 26–34)
MCHC RBC AUTO-ENTMCNC: 32.4 G/DL (ref 31–36)
MCV RBC AUTO: 88.9 FL (ref 80–100)
MONOCYTES ABSOLUTE: 0.5 K/UL (ref 0–1.3)
MONOCYTES RELATIVE PERCENT: 4.4 %
NEUTROPHILS ABSOLUTE: 9.3 K/UL (ref 1.7–7.7)
NEUTROPHILS RELATIVE PERCENT: 84 %
PDW BLD-RTO: 14.7 % (ref 12.4–15.4)
PERFORMED ON: ABNORMAL
PERFORMED ON: ABNORMAL
PLATELET # BLD: 306 K/UL (ref 135–450)
PMV BLD AUTO: 7.4 FL (ref 5–10.5)
POTASSIUM REFLEX MAGNESIUM: 4.1 MMOL/L (ref 3.5–5.1)
RBC # BLD: 3.68 M/UL (ref 4.2–5.9)
SEDIMENTATION RATE, ERYTHROCYTE: 78 MM/HR (ref 0–20)
SODIUM BLD-SCNC: 137 MMOL/L (ref 136–145)
TROPONIN: 0.06 NG/ML
TROPONIN: 0.06 NG/ML
TROPONIN: 0.07 NG/ML
WBC # BLD: 11.1 K/UL (ref 4–11)

## 2021-01-29 PROCEDURE — 36415 COLL VENOUS BLD VENIPUNCTURE: CPT

## 2021-01-29 PROCEDURE — 6360000002 HC RX W HCPCS: Performed by: PHYSICIAN ASSISTANT

## 2021-01-29 PROCEDURE — 93005 ELECTROCARDIOGRAM TRACING: CPT | Performed by: PHYSICIAN ASSISTANT

## 2021-01-29 PROCEDURE — 96368 THER/DIAG CONCURRENT INF: CPT

## 2021-01-29 PROCEDURE — 87040 BLOOD CULTURE FOR BACTERIA: CPT

## 2021-01-29 PROCEDURE — 85652 RBC SED RATE AUTOMATED: CPT

## 2021-01-29 PROCEDURE — 86140 C-REACTIVE PROTEIN: CPT

## 2021-01-29 PROCEDURE — 6360000002 HC RX W HCPCS: Performed by: INTERNAL MEDICINE

## 2021-01-29 PROCEDURE — 84484 ASSAY OF TROPONIN QUANT: CPT

## 2021-01-29 PROCEDURE — 96365 THER/PROPH/DIAG IV INF INIT: CPT

## 2021-01-29 PROCEDURE — 80048 BASIC METABOLIC PNL TOTAL CA: CPT

## 2021-01-29 PROCEDURE — 2580000003 HC RX 258: Performed by: INTERNAL MEDICINE

## 2021-01-29 PROCEDURE — 83605 ASSAY OF LACTIC ACID: CPT

## 2021-01-29 PROCEDURE — 1200000000 HC SEMI PRIVATE

## 2021-01-29 PROCEDURE — 10060 I&D ABSCESS SIMPLE/SINGLE: CPT

## 2021-01-29 PROCEDURE — 2580000003 HC RX 258: Performed by: EMERGENCY MEDICINE

## 2021-01-29 PROCEDURE — 83036 HEMOGLOBIN GLYCOSYLATED A1C: CPT

## 2021-01-29 PROCEDURE — 73630 X-RAY EXAM OF FOOT: CPT

## 2021-01-29 PROCEDURE — 6360000002 HC RX W HCPCS: Performed by: EMERGENCY MEDICINE

## 2021-01-29 PROCEDURE — 99285 EMERGENCY DEPT VISIT HI MDM: CPT

## 2021-01-29 PROCEDURE — 2580000003 HC RX 258: Performed by: PHYSICIAN ASSISTANT

## 2021-01-29 PROCEDURE — 6370000000 HC RX 637 (ALT 250 FOR IP): Performed by: PHYSICIAN ASSISTANT

## 2021-01-29 PROCEDURE — 85025 COMPLETE CBC W/AUTO DIFF WBC: CPT

## 2021-01-29 PROCEDURE — 6370000000 HC RX 637 (ALT 250 FOR IP): Performed by: INTERNAL MEDICINE

## 2021-01-29 RX ORDER — FLUTICASONE PROPIONATE 50 MCG
2 SPRAY, SUSPENSION (ML) NASAL 2 TIMES DAILY
Status: DISCONTINUED | OUTPATIENT
Start: 2021-01-29 | End: 2021-02-03 | Stop reason: HOSPADM

## 2021-01-29 RX ORDER — NICOTINE 21 MG/24HR
1 PATCH, TRANSDERMAL 24 HOURS TRANSDERMAL DAILY
Status: DISCONTINUED | OUTPATIENT
Start: 2021-01-29 | End: 2021-02-03 | Stop reason: HOSPADM

## 2021-01-29 RX ORDER — ONDANSETRON 2 MG/ML
4 INJECTION INTRAMUSCULAR; INTRAVENOUS EVERY 6 HOURS PRN
Status: DISCONTINUED | OUTPATIENT
Start: 2021-01-29 | End: 2021-02-03 | Stop reason: HOSPADM

## 2021-01-29 RX ORDER — ISOSORBIDE MONONITRATE 30 MG/1
30 TABLET, EXTENDED RELEASE ORAL DAILY
Status: DISCONTINUED | OUTPATIENT
Start: 2021-01-30 | End: 2021-02-03 | Stop reason: HOSPADM

## 2021-01-29 RX ORDER — QUETIAPINE FUMARATE 25 MG/1
50 TABLET, FILM COATED ORAL EVERY EVENING
Status: DISCONTINUED | OUTPATIENT
Start: 2021-01-29 | End: 2021-02-03 | Stop reason: HOSPADM

## 2021-01-29 RX ORDER — HYDRALAZINE HYDROCHLORIDE 50 MG/1
50 TABLET, FILM COATED ORAL EVERY 8 HOURS SCHEDULED
Status: DISCONTINUED | OUTPATIENT
Start: 2021-01-29 | End: 2021-02-03 | Stop reason: HOSPADM

## 2021-01-29 RX ORDER — PRAVASTATIN SODIUM 80 MG/1
80 TABLET ORAL NIGHTLY
Status: DISCONTINUED | OUTPATIENT
Start: 2021-01-29 | End: 2021-02-03 | Stop reason: HOSPADM

## 2021-01-29 RX ORDER — PREGABALIN 25 MG/1
25 CAPSULE ORAL 3 TIMES DAILY
Status: DISCONTINUED | OUTPATIENT
Start: 2021-01-29 | End: 2021-02-03 | Stop reason: HOSPADM

## 2021-01-29 RX ORDER — HEPARIN SODIUM 5000 [USP'U]/ML
5000 INJECTION, SOLUTION INTRAVENOUS; SUBCUTANEOUS EVERY 8 HOURS SCHEDULED
Status: DISCONTINUED | OUTPATIENT
Start: 2021-01-29 | End: 2021-02-03 | Stop reason: HOSPADM

## 2021-01-29 RX ORDER — DULOXETIN HYDROCHLORIDE 30 MG/1
30 CAPSULE, DELAYED RELEASE ORAL DAILY
Status: DISCONTINUED | OUTPATIENT
Start: 2021-01-29 | End: 2021-02-03 | Stop reason: HOSPADM

## 2021-01-29 RX ORDER — OXYCODONE AND ACETAMINOPHEN 7.5; 325 MG/1; MG/1
1 TABLET ORAL ONCE
Status: COMPLETED | OUTPATIENT
Start: 2021-01-29 | End: 2021-01-29

## 2021-01-29 RX ORDER — ACETAMINOPHEN 325 MG/1
650 TABLET ORAL EVERY 6 HOURS PRN
Status: DISCONTINUED | OUTPATIENT
Start: 2021-01-29 | End: 2021-02-03 | Stop reason: HOSPADM

## 2021-01-29 RX ORDER — DEXTROSE MONOHYDRATE 25 G/50ML
12.5 INJECTION, SOLUTION INTRAVENOUS PRN
Status: DISCONTINUED | OUTPATIENT
Start: 2021-01-29 | End: 2021-02-03 | Stop reason: HOSPADM

## 2021-01-29 RX ORDER — ACETAMINOPHEN 650 MG/1
650 SUPPOSITORY RECTAL EVERY 6 HOURS PRN
Status: DISCONTINUED | OUTPATIENT
Start: 2021-01-29 | End: 2021-02-03 | Stop reason: HOSPADM

## 2021-01-29 RX ORDER — DEXTROSE MONOHYDRATE 50 MG/ML
100 INJECTION, SOLUTION INTRAVENOUS PRN
Status: DISCONTINUED | OUTPATIENT
Start: 2021-01-29 | End: 2021-02-03 | Stop reason: HOSPADM

## 2021-01-29 RX ORDER — POLYETHYLENE GLYCOL 3350 17 G/17G
17 POWDER, FOR SOLUTION ORAL DAILY PRN
Status: DISCONTINUED | OUTPATIENT
Start: 2021-01-29 | End: 2021-02-03 | Stop reason: HOSPADM

## 2021-01-29 RX ORDER — CALCIUM ACETATE 667 MG/1
1 CAPSULE ORAL DAILY
Status: DISCONTINUED | OUTPATIENT
Start: 2021-01-30 | End: 2021-02-03 | Stop reason: HOSPADM

## 2021-01-29 RX ORDER — ERGOCALCIFEROL 1.25 MG/1
50000 CAPSULE ORAL WEEKLY
Status: DISCONTINUED | OUTPATIENT
Start: 2021-01-31 | End: 2021-02-03 | Stop reason: HOSPADM

## 2021-01-29 RX ORDER — PANTOPRAZOLE SODIUM 40 MG/1
40 TABLET, DELAYED RELEASE ORAL
Status: DISCONTINUED | OUTPATIENT
Start: 2021-01-30 | End: 2021-02-03 | Stop reason: HOSPADM

## 2021-01-29 RX ORDER — OXYCODONE AND ACETAMINOPHEN 7.5; 325 MG/1; MG/1
1 TABLET ORAL EVERY 4 HOURS PRN
Status: DISCONTINUED | OUTPATIENT
Start: 2021-01-29 | End: 2021-01-30

## 2021-01-29 RX ORDER — LOSARTAN POTASSIUM 25 MG/1
50 TABLET ORAL DAILY
Status: DISCONTINUED | OUTPATIENT
Start: 2021-01-30 | End: 2021-02-03 | Stop reason: HOSPADM

## 2021-01-29 RX ORDER — CITALOPRAM 20 MG/1
40 TABLET ORAL DAILY
Status: DISCONTINUED | OUTPATIENT
Start: 2021-01-30 | End: 2021-02-03 | Stop reason: HOSPADM

## 2021-01-29 RX ORDER — CLOPIDOGREL BISULFATE 75 MG/1
75 TABLET ORAL DAILY
Status: DISCONTINUED | OUTPATIENT
Start: 2021-01-30 | End: 2021-02-03 | Stop reason: HOSPADM

## 2021-01-29 RX ORDER — INSULIN GLARGINE 100 [IU]/ML
70 INJECTION, SOLUTION SUBCUTANEOUS NIGHTLY
Status: DISCONTINUED | OUTPATIENT
Start: 2021-01-29 | End: 2021-02-03 | Stop reason: HOSPADM

## 2021-01-29 RX ORDER — NICOTINE POLACRILEX 4 MG
15 LOZENGE BUCCAL PRN
Status: DISCONTINUED | OUTPATIENT
Start: 2021-01-29 | End: 2021-02-03 | Stop reason: HOSPADM

## 2021-01-29 RX ORDER — TORSEMIDE 100 MG/1
100 TABLET ORAL DAILY
Status: DISCONTINUED | OUTPATIENT
Start: 2021-01-29 | End: 2021-02-03 | Stop reason: HOSPADM

## 2021-01-29 RX ORDER — NITROGLYCERIN 0.4 MG/1
0.4 TABLET SUBLINGUAL EVERY 5 MIN PRN
Status: DISCONTINUED | OUTPATIENT
Start: 2021-01-29 | End: 2021-02-03 | Stop reason: HOSPADM

## 2021-01-29 RX ORDER — NEPHROCAP 1 MG
1 CAP ORAL DAILY
Status: DISCONTINUED | OUTPATIENT
Start: 2021-01-29 | End: 2021-01-29 | Stop reason: CLARIF

## 2021-01-29 RX ORDER — PROMETHAZINE HYDROCHLORIDE 25 MG/1
12.5 TABLET ORAL EVERY 6 HOURS PRN
Status: DISCONTINUED | OUTPATIENT
Start: 2021-01-29 | End: 2021-02-03 | Stop reason: HOSPADM

## 2021-01-29 RX ORDER — CHOLECALCIFEROL (VITAMIN D3) 10 MCG
1 TABLET ORAL DAILY
Status: DISCONTINUED | OUTPATIENT
Start: 2021-01-30 | End: 2021-02-03 | Stop reason: HOSPADM

## 2021-01-29 RX ORDER — ASPIRIN 81 MG/1
81 TABLET, CHEWABLE ORAL DAILY
Status: DISCONTINUED | OUTPATIENT
Start: 2021-01-30 | End: 2021-02-03 | Stop reason: HOSPADM

## 2021-01-29 RX ORDER — ALBUTEROL SULFATE 2.5 MG/3ML
2.5 SOLUTION RESPIRATORY (INHALATION) EVERY 4 HOURS PRN
Status: DISCONTINUED | OUTPATIENT
Start: 2021-01-29 | End: 2021-02-03 | Stop reason: HOSPADM

## 2021-01-29 RX ORDER — SODIUM CHLORIDE 0.9 % (FLUSH) 0.9 %
10 SYRINGE (ML) INJECTION PRN
Status: DISCONTINUED | OUTPATIENT
Start: 2021-01-29 | End: 2021-02-03 | Stop reason: HOSPADM

## 2021-01-29 RX ORDER — SODIUM CHLORIDE 0.9 % (FLUSH) 0.9 %
10 SYRINGE (ML) INJECTION EVERY 12 HOURS SCHEDULED
Status: DISCONTINUED | OUTPATIENT
Start: 2021-01-29 | End: 2021-02-03 | Stop reason: HOSPADM

## 2021-01-29 RX ORDER — OXYCODONE AND ACETAMINOPHEN 7.5; 325 MG/1; MG/1
1 TABLET ORAL EVERY 4 HOURS PRN
COMMUNITY
End: 2021-02-25 | Stop reason: SDUPTHER

## 2021-01-29 RX ADMIN — HEPARIN SODIUM 5000 UNITS: 5000 INJECTION INTRAVENOUS; SUBCUTANEOUS at 20:29

## 2021-01-29 RX ADMIN — INSULIN LISPRO 1 UNITS: 100 INJECTION, SOLUTION INTRAVENOUS; SUBCUTANEOUS at 20:34

## 2021-01-29 RX ADMIN — PRAVASTATIN SODIUM 80 MG: 80 TABLET ORAL at 20:29

## 2021-01-29 RX ADMIN — Medication 10 ML: at 20:29

## 2021-01-29 RX ADMIN — OXYCODONE HYDROCHLORIDE AND ACETAMINOPHEN 1 TABLET: 7.5; 325 TABLET ORAL at 20:29

## 2021-01-29 RX ADMIN — VANCOMYCIN HYDROCHLORIDE 1500 MG: 10 INJECTION, POWDER, LYOPHILIZED, FOR SOLUTION INTRAVENOUS at 16:22

## 2021-01-29 RX ADMIN — OXYCODONE HYDROCHLORIDE AND ACETAMINOPHEN 1 TABLET: 7.5; 325 TABLET ORAL at 16:22

## 2021-01-29 RX ADMIN — PREGABALIN 25 MG: 25 CAPSULE ORAL at 20:29

## 2021-01-29 RX ADMIN — CEFEPIME 2000 MG: 2 INJECTION, POWDER, FOR SOLUTION INTRAVENOUS at 15:33

## 2021-01-29 RX ADMIN — QUETIAPINE FUMARATE 50 MG: 25 TABLET ORAL at 20:29

## 2021-01-29 RX ADMIN — HYDRALAZINE HYDROCHLORIDE 50 MG: 50 TABLET, FILM COATED ORAL at 20:29

## 2021-01-29 ASSESSMENT — ENCOUNTER SYMPTOMS
VOMITING: 0
DIARRHEA: 0
EYE PAIN: 0
SHORTNESS OF BREATH: 0
COUGH: 0
NAUSEA: 0
BACK PAIN: 1
ABDOMINAL PAIN: 0

## 2021-01-29 ASSESSMENT — PAIN SCALES - GENERAL
PAINLEVEL_OUTOF10: 8
PAINLEVEL_OUTOF10: 7
PAINLEVEL_OUTOF10: 8

## 2021-01-29 ASSESSMENT — PAIN DESCRIPTION - LOCATION
LOCATION: FOOT
LOCATION: BACK;FOOT

## 2021-01-29 NOTE — CONSULTS
Pharmacy Note  Vancomycin Consult    Candace Garcia is a 44 y.o. male started on Vancomycin for cellulitis, consult received from Mildred LAGUNA to manage therapy. Also receiving the following antibiotics: Cefepime. Allergies:  Morphine     Tmax: 97.8      Recent Labs     01/29/21  1518   WBC 11.1*       Wt Readings from Last 1 Encounters:   01/29/21 242 lb (109.8 kg)         Body mass index is 35.74 kg/m². Goal Vancomycin trough: 10-15 mcg/mL or 15-20 mcg/mL   Goal Vancomycin AUC: 400-600     Assessment/Plan:  Will initiate Vancomycin with a one time dose of 1500  mg x1 in ED. Pharmacy will continue to dose and monitor Vancomycin if further consulted by inpatient hospitalist    Thank you for the consult.   Darryn Garcia PharmD  1/29/2021 at 3:55 PM

## 2021-01-29 NOTE — ED PROVIDER NOTES
I independently performed a history and physical on Agustin De Santiago. All diagnostic, treatment, and disposition decisions were made by myself in conjunction with the advanced practice provider. For further details of [address] emergency department encounter, please see JASE Vu's documentation. Patient is a 57-year-old male presenting today due to concern for worsening right foot swelling and redness after initially having some skin torn off of the right foot roughly 2 weeks ago. He did see his primary doctor who recommended to go to the emergency department to be admitted for antibiotics. He does have a history of end-stage renal disease and last went to dialysis on Wednesday. He has chronic shortness of breath but denies any new changes today. No chest pain. No headache or abdominal pain. He did have an episode of vomiting yesterday which was abnormal for him but denies any current nausea. No fevers or chills. He does not normally feel his right foot very well and therefore was unaware that he had any significant concern with infection. He did have significant swelling a few weeks ago causing some skin lesions to his lower legs from the pitting edema but does were overall improving. His main concern is a right leg infection so he came to the ED for further evaluation. Physical:   Gen: No acute distress. AOx3.   Psych: Normal mood and affect  HEENT: NCAT, MMM  Neck: supple  Cardiac: RRR, pulses 2+ in upper extremities and 1+ to DPs bilaterally, 2+ pitting edema bilaterally  Lungs: C2AB, no R/R/W  Abdomen: soft and nontender with no R/D/G  Neuro: no focal neuro deficits with strength and sensation 5/5 in upper extremities, diminished sensation to bilateral feet, normal strength with dorsiflexion and plantar flexion bilaterally  MSK: Normal range of motion of right knee and right ankle and no reported pain during this movement  Skin: Erythema extending from the toes up to

## 2021-01-29 NOTE — ED PROVIDER NOTES
[address] [facility]  ED  EMERGENCY DEPARTMENT ENCOUNTER        Pt Name: Saul Banks  MRN: [id number]  [place] 1970  Date of evaluation: 1/29/2021  Provider: AJSE Mccauley  PCP: Yas Mcdowell MD     I have seen and evaluated this patient with my supervising physician Aleja Menezes MD.    [address] [place] [address]       Chief Complaint   Patient presents with    Foot Pain     pt with sores on his foot several and with pain       HISTORY OF PRESENT ILLNESS   (Location, Timing/Onset, Context/Setting, Quality, Duration, Modifying Factors, Severity, Associated Signs and Symptoms)  Note limiting factors. Saul Banks is a 44 y.o. male Sonjia Hasmukhey to the emergency department for right foot cellulitis. This is an insulin-dependent diabetic, with end-stage renal disease on hemodialysis Monday Wednesday Friday, who presents for evaluation of right foot cellulitis. Patient was seen by his PCP today and sent to the emergency department for evaluation. Patient states he has neuropathy and denies any toe or foot pain to me. He states about a week and a half ago he noticed he had sores on his right foot and toes, states that when he removed his sock patch of skin fell off and it was bleeding. He has not seen podiatry for diabetic foot care. He denies fever, chills, nausea, vomiting. He did not get dialysis today. He denies chest pain, shortness of breath, headache, abdominal pain. Denies any recent antibiotic use within the last 90 days. Denies any recent falls or injury to the foot or ankle. Nursing Notes were all reviewed and agreed with or any disagreements were addressed in the HPI. REVIEW OF SYSTEMS    (2-9 systems for level 4, 10 or more for level 5)     Review of Systems   Constitutional: Negative for chills, fatigue and fever. Eyes: Negative for pain. Respiratory: Negative for cough and shortness of breath. Cardiovascular: Negative for chest pain.    Gastrointestinal: Negative for abdominal pain, diarrhea, nausea and vomiting. Genitourinary: Negative for dysuria. Musculoskeletal: Positive for back pain (chronic). Negative for neck pain and neck stiffness. Skin: Negative for rash. Neurological: Negative for dizziness and headaches. Psychiatric/Behavioral: Negative for confusion. Positives and Pertinent negatives as per HPI. Except as noted above in the ROS, all other systems were reviewed and negative.        PAST MEDICAL HISTORY     Past Medical History:   Diagnosis Date    Ambulatory dysfunction     walker for long distances, SOB with distance    Aortic stenosis     echo 2017    Arthritis     hands and hips    Asthma     Bilateral hilar adenopathy syndrome 6/3/2013    CAD (coronary artery disease)     Dr. Arminda Duckworth [facility]) 04/19/2019    EF= 43%    CHF (congestive heart failure) ([facility])     Chronic pain     COPD (chronic obstructive pulmonary disease) ([address]     pulmonology Dr. Anand Naranjo    Depression     Diabetes mellitus ([address]     borderline    Difficult intravenous access     Emphysema of lung ([address]     ESRD (end stage renal disease) on dialysis ([address]     MWF    Fear of needles     Gastric ulcer     GERD (gastroesophageal reflux disease)     Heart valve problem     bicuspic valve    Hemodialysis patient ([address]     History of spinal fracture     work incident    Hx of blood clots     Bilateral lower extremities; stents in place    Hyperlipidemia     Hypertension     MI (myocardial infarction) ([address] 2019    has had 9 MIs. 2019 was the last    Neuromuscular disorder ([address]     due to CVA    Numbness and tingling in left arm     from fistula    Pneumonia     PONV (postoperative nausea and vomiting)     Prolonged emergence from general anesthesia     States requires more medication than most people    Sleep apnea     Uses CPAP    Stroke ([address]     7mm thalamic cva 2017 deficts left side, left side weakness    TIA (transient ischemic attack)     Unspecified diseases of blood and blood-forming organs          SURGICAL HISTORY     Past Surgical History:   Procedure Laterality Date    AORTIC VALVE REPLACEMENT N/A 10/15/2019    TRANSCATHETER AORTIC VALVE REPLACEMENT FEMORAL APPROACH performed by Radha Baca MD at [address] [place] [address] Ave Right 7/2/2019    PERITONEAL DIALYSIS CATHETER REMOVAL performed by Viola Riley MD at [facility] COLONOSCOPY  2/29/2015    WNL    CORONARY ANGIOPLASTY WITH STENT PLACEMENT  05/26/15    CYST REMOVAL  08/14/2013    EXCISION CYSTS, NECK X2 AND ABDOMINAL benign    DIAGNOSTIC CARDIAC CATH LAB PROCEDURE      DIALYSIS FISTULA CREATION Left 10/30/2017    LEFT BRACHIAL CEPHALIC FISTULA    DIALYSIS FISTULA CREATION Left 3/27/2019    LIGATION  AV FISTULA performed by Tiago Weston MD at [facility], COLON, DIAGNOSTIC      OTHER SURGICAL HISTORY  02/01/2017    laparoscopic cholecystectomy with intraoperative cholangiogram    OTHER SURGICAL HISTORY  2018    PORT PLACEMENT  - vas cath    OTHER SURGICAL HISTORY Bilateral 06/26/2018    laprascopic peritoneal dialysis catheter placement    OTHER SURGICAL HISTORY Right 09/2018    peritoneal dialysis port placed on right side of abdomen    OTHER SURGICAL HISTORY  05/28/2019    PTA/Stenting R External Iliac artery    MA LAP INSERTION TUNNELED INTRAPERITONEAL CATHETER N/A 9/21/2018    LAPAROSCOPIC PERITONEAL DIALYSIS CATHETER REPLACEMENT performed by Viola Riley MD at [address] [place] [address]  01/06/2016    UPPER GASTROINTESTINAL ENDOSCOPY  01/29/2017    possible candida, otherwise normal appearing    VASCULAR SURGERY  aprx 2 years ago    2 stents placed, each side of groin         CURRENTMEDICATIONS       Previous Medications    ALBUTEROL (PROVENTIL) (2.5 MG/3ML) 0.083% NEBULIZER SOLUTION    INHALE 1 VIAL VIA NEBULIZER EVERY 6 HOURS AS NEEDED apply route 4 times daily    INSULIN SYRINGE-NEEDLE U-100 30G X 1/2\" 0.5 ML MISC    1 each by Does not apply route daily    IPRATROPIUM-ALBUTEROL (DUONEB) 0.5-2.5 (3) MG/3ML SOLN NEBULIZER SOLUTION    Inhale 3 mLs into the lungs every 6 hours as needed for Shortness of Breath    ISOSORBIDE MONONITRATE (IMDUR) 30 MG EXTENDED RELEASE TABLET    TAKE 1 TABLET BY MOUTH EVERY DAY    LANCETS MISC    Test daily    LIDOCAINE 4 % EXTERNAL PATCH    Place 1 patch onto the skin daily    LINZESS 145 MCG CAPSULE    TAKE 1 CAPSULE BY MOUTH EVERY MORNING BEFORE BREAKFAST    LOSARTAN (COZAAR) 50 MG TABLET    TAKE (1) TABLET BY MOUTH DAILY    NITROGLYCERIN (NITROSTAT) 0.4 MG SL TABLET    DISSOLVE 1 TABLET UNDER THE TONGUE AS NEEDED FOR CHEST PAIN EVERY 5 MINUTES UP TO 3 TIMES. IF NO RELIEF CALL 911. NYSTATIN (MYCOSTATIN) 032950 UNIT/GM CREAM    Apply topically 2 times daily. NYSTATIN-TRIAMCINOLONE (MYCOLOG II) 522796-6.1 UNIT/GM-% CREAM    Apply topically 2 times daily. ONDANSETRON (ZOFRAN ODT) 4 MG DISINTEGRATING TABLET    Take 1 tablet by mouth every 8 hours as needed for Nausea    OXYCODONE-ACETAMINOPHEN (PERCOCET) 7.5-325 MG PER TABLET    Take 1 tablet by mouth every 4 hours as needed for Pain. PANTOPRAZOLE (PROTONIX) 40 MG TABLET    TAKE (1) TABLET BY MOUTH EACH MORNING BEFORE BREAKFAST    POLYETHYLENE GLYCOL 3350 GRAN        PRAVASTATIN (PRAVACHOL) 80 MG TABLET    TAKE (1) TABLET BY MOUTH ONCE DAILY    PREGABALIN (LYRICA) 25 MG CAPSULE    Take 1 capsule by mouth 3 times daily for 5 days.     QUETIAPINE (SEROQUEL) 50 MG TABLET    TAKE 1 TABLET BY MOUTH EVERY EVENING    TIOTROPIUM BROMIDE-OLODATEROL (STIOLTO RESPIMAT) 2.5-2.5 MCG/ACT AERS    Inhale 2 puffs into the lungs daily    TIZANIDINE (ZANAFLEX) 4 MG TABLET    TAKE 1 TABLET BY MOUTH THREE TIMES DAILY    TORSEMIDE (DEMADEX) 100 MG TABLET    Take 1-2 tablets by mouth daily    TRAZODONE (DESYREL) 150 MG TABLET    TAKE (1) TABLET BY MOUTH NIGHTLY    VITAMIN D (ERGOCALCIFEROL) 53724 UNITS CAPS CAPSULE    TK 1 C PO WEEKLY         ALLERGIES     Morphine    FAMILYHISTORY       Family History   Problem Relation Age of Onset    Diabetes Mother     Heart Disease Father     Kidney Disease Sister         stage 4-kidney failure    Cancer Sister     Heart Disease Sister     Obesity Sister     Cancer Sister     Heart Disease Sister     Obesity Sister     Alcohol Abuse Brother           SOCIAL HISTORY       Social History     Tobacco Use    Smoking status: Current Every Day Smoker     Packs/day: 1.00     Years: 33.00     Pack years: 33.00     Types: Cigarettes     Last attempt to quit: 2020     Years since quittin.7    Smokeless tobacco: Never Used    Tobacco comment: TRYING TO QUIT 3-7 a day   Substance Use Topics    Alcohol use: Not Currently     Alcohol/week: 0.0 standard drinks     Comment: occ    Drug use: No       SCREENINGS             PHYSICAL EXAM    (up to 7 for level 4, 8 or more for level 5)     ED Triage Vitals [21 1356]   BP Temp Temp Source Pulse Resp SpO2 Height Weight   (!) 181/88 97.8 °F (36.6 °C) Oral 100 16 98 % 5' 9\" (1.753 m) 242 lb (109.8 kg)       Physical Exam  Vitals signs and nursing note reviewed. Constitutional:       General: He is not in acute distress. Appearance: He is well-developed. He is not diaphoretic. HENT:      Head: Normocephalic and atraumatic. Eyes:      General:         Right eye: No discharge. Left eye: No discharge. Neck:      Musculoskeletal: Normal range of motion and neck supple. Pulmonary:      Effort: No respiratory distress. Breath sounds: No stridor. Musculoskeletal: Normal range of motion. Skin:     General: Skin is warm and dry. Coloration: Skin is not pale. Findings: Erythema (Erythema of toes of the right foot extending up to the foot dorsum, does not extend past the ankle.) present.    Neurological:      Mental Status: He is alert and oriented to person, place, and time. Comments: No gross facial drooping. Moves all 4 extremities spontaneously.    Psychiatric:         Behavior: Behavior normal.                 DIAGNOSTIC RESULTS   LABS:    Labs Reviewed   CBC WITH AUTO DIFFERENTIAL - Abnormal; Notable for the following components:       Result Value    WBC 11.1 (*)     RBC 3.68 (*)     Hemoglobin 10.6 (*)     Hematocrit 32.8 (*)     Neutrophils Absolute 9.3 (*)     Lymphocytes Absolute 0.9 (*)     All other components within normal limits    Narrative:     Performed at:  [address], [facility] [place]   Phone [phone]   BASIC METABOLIC PANEL W/ REFLEX TO MG FOR LOW K - Abnormal; Notable for the following components:    Glucose 180 (*)     BUN 67 (*)     CREATININE 5.4 (*)     GFR Non- 11 (*)     GFR  14 (*)     All other components within normal limits    Narrative:     Franceen Phlegm tel. [phone],  Chemistry results called to and read back by Jaxon Rocha RN, 01/29/2021  16:03, by [facility]  Performed at:  [address], [facility] [place]   Phone [phone]   TROPONIN - Abnormal; Notable for the following components:    Troponin 0.07 (*)     All other components within normal limits    Narrative:     Franceen Phlegm tel. [phone],  Chemistry results called to and read back by Jaxon Rocha RN, 01/29/2021  16:03, by [facility]  Performed at:  [address], [facility] [place]   Phone [phone]   POCT GLUCOSE - Abnormal; Notable for the following components:    POC Glucose 188 (*)     All other components within normal limits    Narrative:     Performed at:  [address], [facility] [place]   Phone [phone]   CULTURE, BLOOD 1   CULTURE, BLOOD 2   LACTIC ACID, PLASMA    Narrative:     Performed at:  [address] [place] [address] [address],  [place], [address] [place], [place] [address]aguila [place]her   Phone [phone]   LACTIC ACID, PLASMA   C-REACTIVE PROTEIN   SEDIMENTATION RATE   POCT GLUCOSE       All other labs were within normal range or not returned as of this dictation. EKG: All EKG's are interpreted by the Emergency Department Physician in the absence of a cardiologist.  Please see their note for interpretation of EKG. RADIOLOGY:   Non-plain film images such as CT, Ultrasound and MRI are read by the radiologist. Plain radiographic images are visualized and preliminarily interpreted by the ED Provider with the below findings:        Interpretation per the Radiologist below, if available at the time of this note:    XR FOOT RIGHT (MIN 3 VIEWS)   Final Result   No acute osseous abnormality. No results found. PROCEDURES   Unless otherwise noted below, none     Procedures    PROCEDURE:  INCISION & DRAINAGE of posterior neck mass  Grover Worley or their surrogate had an opportunity to ask questions, and the risks, benefits, and alternatives were discussed. The mass was prepped with Chlorehexadine and draped to maintain a sterile field. A local anesthetic was used to anesthetize the abscess. A linear incision was made. Small amt of blood and purulent drainage appreciated . The patient experienced some pain but generally tolerated the procedure quite well. CRITICAL CARE TIME   Because of high probability of sudden clinical deterioration of the patient's condition or  further deterioration, critical care time included my full attention to the patient's condition, including chart data review, documentation, medication ordering, reviewing the patient's old records, reevaluation patient's cardiac, pulmonary and neurological status. Reassessment of vital signs. Consultations with ED attending and admitting physician. Ordering, interpreting reviewing diagnostic testing.  Therefore, my critical care time was 20 minutes of direct attention to the patient's condition did not include time spent on procedures. CONSULTS:  PHARMACY TO DOSE VANCOMYCIN  IP CONSULT TO HOSPITALIST      EMERGENCY DEPARTMENT COURSE and DIFFERENTIAL DIAGNOSIS/MDM:   Vitals:    Vitals:    01/29/21 1356 01/29/21 1534   BP: (!) 181/88 (!) 191/76   Pulse: 100 97   Resp: 16 18   Temp: 97.8 °F (36.6 °C)    TempSrc: Oral    SpO2: 98% 99%   Weight: 242 lb (109.8 kg)    Height: 5' 9\" (1.753 m)        Patient was given the following medications:  Medications   oxyCODONE-acetaminophen (PERCOCET) 7.5-325 MG per tablet 1 tablet (has no administration in time range)   vancomycin 1500 mg in dextrose 5% 300 mL IVPB (has no administration in time range)   cefepime (MAXIPIME) 2000 mg IVPB minibag (2,000 mg Intravenous New Bag 1/29/21 1533)           Differential diagnosis: necrotizing fasciitis, deep space soft tissue bacterial skin infection, viral rash, systemic infectious rash, aseptic cyst, malignancy, other    Patient seen and evaluated as detailed above for 1.5 week h/o right foot cellulitis. IDDM and ESRD on HD MWF -- he missed his dialysis today (Friday). Patient is afebrile, in no acute distress, and nontoxic in appearance. Heart rate was 100. He is afebrile. Foot exam is concerning for cellulitis. Meets sepsis criteria with a heart rate of 100, white count of 11,100. His lactic was negative. He was given cefepime and vancomycin. She does have chronic back pain and takes Percocet, he was given his home dose of Percocet 7.5 mg. Patient did have tender erythematous small mass with central head on the right posterior neck. This was not his complaint in the ED but was noted by attending physician on exam and I did attempt to perform I&D.   Got out a small amount of drainage as detailed above in procedure note, however continues to have very firm underlying mass, and I am suspected this is an epidermoid/sebaceous cyst.  Advised follow-up with Derm as outpatient for definitive excision. We will place to hospitalist for admission for cellulitis, given his diabetes, do feel that he would benefit from admission and further evaluation with possible podiatry consult. Results for orders placed or performed during the hospital encounter of 01/29/21   CBC Auto Differential   Result Value Ref Range    WBC 11.1 (H) 4.0 - 11.0 K/uL    RBC 3.68 (L) 4.20 - 5.90 M/uL    Hemoglobin 10.6 (L) 13.5 - 17.5 g/dL    Hematocrit 32.8 (L) 40.5 - 52.5 %    MCV 88.9 80.0 - 100.0 fL    MCH 28.8 26.0 - 34.0 pg    MCHC 32.4 31.0 - 36.0 g/dL    RDW 14.7 12.4 - 15.4 %    Platelets 532 684 - 535 K/uL    MPV 7.4 5.0 - 10.5 fL    Neutrophils % 84.0 %    Lymphocytes % 8.3 %    Monocytes % 4.4 %    Eosinophils % 2.8 %    Basophils % 0.5 %    Neutrophils Absolute 9.3 (H) 1.7 - 7.7 K/uL    Lymphocytes Absolute 0.9 (L) 1.0 - 5.1 K/uL    Monocytes Absolute 0.5 0.0 - 1.3 K/uL    Eosinophils Absolute 0.3 0.0 - 0.6 K/uL    Basophils Absolute 0.1 0.0 - 0.2 K/uL   Basic Metabolic Panel w/ Reflex to MG   Result Value Ref Range    Sodium 137 136 - 145 mmol/L    Potassium reflex Magnesium 4.1 3.5 - 5.1 mmol/L    Chloride 99 99 - 110 mmol/L    CO2 25 21 - 32 mmol/L    Anion Gap 13 3 - 16    Glucose 180 (H) 70 - 99 mg/dL    BUN 67 (H) 7 - 20 mg/dL    CREATININE 5.4 (HH) 0.9 - 1.3 mg/dL    GFR Non- 11 (A) >60    GFR  14 (A) >60    Calcium 9.3 8.3 - 10.6 mg/dL   Lactic Acid, Plasma   Result Value Ref Range    Lactic Acid 0.8 0.4 - 2.0 mmol/L   Troponin   Result Value Ref Range    Troponin 0.07 (H) <0.01 ng/mL   POCT Glucose   Result Value Ref Range    POC Glucose 188 (H) 70 - 99 mg/dl    Performed on ACCU-CHEK        I consulted with the hospitalist. We thoroughly discussed the history, physical exam, laboratory and imaging studies, as well as, emergency department course.  Based upon that discussion, we've decided to admit Son Vivar for further observation and evaluation of Igor Ann's cellulitis. FINAL IMPRESSION      1. Cellulitis of right foot    2. Insulin dependent type 2 diabetes mellitus, controlled ([facility]    3. End-stage renal disease on hemodialysis ([facility]          DISPOSITION/PLAN   DISPOSITION        PATIENT REFERREDTO:  No follow-up provider specified.     DISCHARGE MEDICATIONS:  New Prescriptions    No medications on file       DISCONTINUED MEDICATIONS:  Discontinued Medications    No medications on file              (Please note that portions of this note were completed with a voice recognition program.  Efforts were made to edit the dictations but occasionally words are mis-transcribed.)    JASE Mratinez (electronically signed)           JASE Martinez  01/29/21 5712

## 2021-01-29 NOTE — H&P
Hospital Medicine History & Physical      PCP: Don Veliz MD    Date of Admission: 1/29/2021    Date of Service: Pt seen/examined on 01/29/21 and Admitted to Inpatient with expected LOS greater than two midnights due to medical therapy right lower extremity cellulitis. Chief Complaint: Wound and redness at right lower extremity      History Of Present Illness:       44 y.o. male who presented to [facility] with right foot wound and swelling. This is a patient with end-stage renal disease, diabetes mellitus type 2 with diabetic peripheral neuropathy. About a week and a half ago patient noticed sores on his right foot and toes. When he removed his sock, patch of skin fell off and noticed that it was bleeding. Does not follow-up with podiatrist.  Because of being sick and worried about foot wound, today patient missed his dialysis appointment and went to the primary care physician. From the primary care office, patient was directed to the emergency department. In the emergency department, patient was diagnosed with right lower extremity cellulitis. X-ray of the right foot did not show osteomyelitis. Patient was started on empiric antibiotics and admitted.     No other accompanying symptoms    Nothing onset makes the patient feel better or worse    Had foot issues related to peripheral neuropathy in the past, but never this bad.         Past Medical History:          Diagnosis Date    Ambulatory dysfunction     walker for long distances, SOB with distance    Aortic stenosis     echo 2017    Arthritis     hands and hips    Asthma     Bilateral hilar adenopathy syndrome 6/3/2013    CAD (coronary artery disease)     Dr. Carolyn Salcedo [facility]) 04/19/2019    EF= 43%    CHF (congestive heart failure) (HCC)     Chronic pain     COPD (chronic obstructive pulmonary disease) [facility])     pulmonology Dr. Mariah Hammonds    Depression     Diabetes mellitus ([facility]     borderline    Difficult intravenous access     Emphysema of lung ([facility]     ESRD (end stage renal disease) on dialysis ([address]     MWF    Fear of needles     Gastric ulcer     GERD (gastroesophageal reflux disease)     Heart valve problem     bicuspic valve    Hemodialysis patient ([address]     History of spinal fracture     work incident    Hx of blood clots     Bilateral lower extremities; stents in place    Hyperlipidemia     Hypertension     MI (myocardial infarction) ([address] 2019    has had 9 MIs. 2019 was the last    Neuromuscular disorder ([address]     due to CVA    Numbness and tingling in left arm     from fistula    Pneumonia     PONV (postoperative nausea and vomiting)     Prolonged emergence from general anesthesia     States requires more medication than most people    Sleep apnea     Uses CPAP    Stroke ([facility]     7mm thalamic cva 2017 deficts left side, left side weakness    TIA (transient ischemic attack)     Unspecified diseases of blood and blood-forming organs        Past Surgical History:          Procedure Laterality Date    AORTIC VALVE REPLACEMENT N/A 10/15/2019    TRANSCATHETER AORTIC VALVE REPLACEMENT FEMORAL APPROACH performed by Maged Bautista MD at [address] [place] [address] Right 7/2/2019    PERITONEAL DIALYSIS CATHETER REMOVAL performed by Pedro Park MD at [facility] COLONOSCOPY  2/29/2015    WN    CORONARY ANGIOPLASTY WITH STENT PLACEMENT  05/26/15    CYST REMOVAL  08/14/2013    EXCISION CYSTS, NECK X2 AND ABDOMINAL benign    DIAGNOSTIC CARDIAC CATH LAB PROCEDURE      DIALYSIS FISTULA CREATION Left 10/30/2017    LEFT BRACHIAL CEPHALIC FISTULA    DIALYSIS FISTULA CREATION Left 3/27/2019    LIGATION  AV FISTULA [facility] [address], COLON, DIAGNOSTIC      OTHER SURGICAL HISTORY  02/01/2017    laparoscopic cholecystectomy with intraoperative cholangiogram    OTHER SURGICAL HISTORY  2018    PORT PLACEMENT  - vas cath    OTHER SURGICAL HISTORY Bilateral 06/26/2018    laprascopic peritoneal dialysis catheter placement    OTHER SURGICAL HISTORY Right 09/2018    peritoneal dialysis port placed on right side of abdomen    OTHER SURGICAL HISTORY  05/28/2019    PTA/Stenting R External Iliac artery    [place] LAP INSERTION TUNNELED INTRAPERITONEAL CATHETER N/A 9/21/2018    LAPAROSCOPIC PERITONEAL DIALYSIS CATHETER REPLACEMENT performed by Xochitl Vega MD at [facility] ENDOSCOPY  01/06/2016    UPPER GASTROINTESTINAL ENDOSCOPY  01/29/2017    possible candida, otherwise normal appearing    VASCULAR SURGERY  aprx 2 years ago    2 stents placed, each side of groin       Medications Prior to Admission:      Prior to Admission medications    Medication Sig Start Date End Date Taking? Authorizing Provider   oxyCODONE-acetaminophen (PERCOCET) 7.5-325 MG per tablet Take 1 tablet by mouth every 4 hours as needed for Pain. Yes Historical Provider, MD   glucose monitoring kit (FREESTYLE) monitoring kit 1 kit by Does not apply route daily 1/8/21   Suzan Parra MD   blood glucose test strips (GLUCOSE METER TEST) strip 1 each by In Vitro route 5 times daily As needed. 1/8/21   Suzan Parra MD   nitroGLYCERIN (NITROSTAT) 0.4 MG SL tablet DISSOLVE 1 TABLET UNDER THE TONGUE AS NEEDED FOR CHEST PAIN EVERY 5 MINUTES UP TO 3 TIMES. IF NO RELIEF CALL 911. 1/7/21   Suzan Parra MD   Insulin Pen Needle 31G X 5 MM MISC 1 each by Does not apply route 4 times daily 12/28/20   Spencer Crooked, APRN - CNP   hydrOXYzine (VISTARIL) 50 MG capsule TAKE 1 TO 2 CAPSULES BY MOUTH NIGHTLY 12/21/20   Natalia Cerna MD   pregabalin (LYRICA) 25 MG capsule Take 1 capsule by mouth 3 times daily for 5 days.  12/18/20 12/23/20  JAY Washington CNP   lidocaine 4 % external patch Place 1 patch onto the skin daily 12/19/20   JAY Washington CNP   hydrALAZINE (APRESOLINE) 50 MG tablet TAKE 1/2 TABLET BY MOUTH EVERY 8 HOURS 11/24/20   Ester Rosenthal MD   B Complex-C-Folic Acid (VIRT-CAPS) 1 MG CAPS TK ONE C PO  QD 11/18/20   Ester Rosenthal MD   Continuous Blood Gluc Sensor (FREESTYLE STEPHANY 14 DAY SENSOR) MISC 1 each by Does not apply route every 14 days 11/10/20   Ester Rosenthal MD   insulin glargine [facility]) 100 UNIT/ML injection pen Inject 70 Units into the skin nightly 11/10/20   Ester Rosenthal MD   traZODone (DESYREL) 150 MG tablet TAKE (1) TABLET BY MOUTH NIGHTLY 11/4/20   Ester Rosenthal MD   citalopram (CELEXA) 40 MG tablet TAKE (1) TABLET BY MOUTH DAILY 11/4/20   Ester Rosenthal MD   insulin aspart (NOVOLOG FLEXPEN) 100 UNIT/ML injection pen Inject 20 Units into the skin 3 times daily (before meals) 10/12/20   Ester Rosenthal MD   clopidogrel (PLAVIX) 75 MG tablet TAKE 1 TABLET BY MOUTH ONCE DAILY 10/2/20   Joyce Razo MD   pravastatin (PRAVACHOL) 80 MG tablet TAKE (1) TABLET BY MOUTH ONCE DAILY 9/29/20   Joyce Razo MD   QUEtiapine (SEROQUEL) 50 MG tablet TAKE 1 TABLET BY MOUTH EVERY EVENING 9/28/20   Ester Rosenthal MD   isosorbide mononitrate (IMDUR) 30 MG extended release tablet TAKE 1 TABLET BY MOUTH EVERY DAY 9/1/20   Ester Rosenthal MD   torsemide (DEMADEX) 100 MG tablet Take 1-2 tablets by mouth daily 8/5/20   Ester Rosenthal MD   ondansetron (ZOFRAN ODT) 4 MG disintegrating tablet Take 1 tablet by mouth every 8 hours as needed for Nausea 8/5/20   Ester Rosenthal MD   LINZESS 145 MCG capsule TAKE 1 CAPSULE BY MOUTH EVERY MORNING BEFORE BREAKFAST 7/6/20   Ester Rosenthal MD   Calcium Acetate, Phos Binder, 667 MG CAPS TAKE 1 CAPSULE BY MOUTH THREE TIMES DAILY WITH MEALS 6/29/20   Ester Rosenthal MD   tiZANidine (ZANAFLEX) 4 MG tablet TAKE 1 TABLET BY MOUTH THREE TIMES DAILY 6/4/20   Ester Rosenthal MD   DULoxetine (CYMBALTA) 30 MG extended release capsule TAKE 1 CAPSULE BY MOUTH EVERY DAY 4/3/20   JAY Baumann - ILAN   nystatin-triamcinolone (Yassine Peng II) 828269-5.1 UNIT/GM-% cream Apply topically 2 times daily. 3/16/20   JAY London - CNP   losartan (COZAAR) 50 MG tablet TAKE (1) TABLET BY MOUTH DAILY 3/10/20   Curt Acevedo MD   pantoprazole (PROTONIX) 40 MG tablet TAKE (1) TABLET BY MOUTH EACH MORNING BEFORE BREAKFAST 3/10/20   Curt Acevedo MD   albuterol (PROVENTIL) (2.5 MG/3ML) 0.083% nebulizer solution INHALE 1 VIAL VIA NEBULIZER EVERY 6 HOURS AS NEEDED FOR WHEEZING 3/10/20   Curt Acevedo MD   nystatin (MYCOSTATIN) 586744 UNIT/GM cream Apply topically 2 times daily. 3/4/20   Curt Acevedo MD   Insulin Syringe-Needle U-100 30G X 1/2\" 0.5 ML MISC 1 each by Does not apply route daily 3/4/20   Curt Acevedo MD   blood glucose test strips (FREESTYLE LITE) strip Daily As needed.  8/19/19   Curt Acevedo MD   glucose monitoring kit (FREESTYLE) monitoring kit 1 kit by Does not apply route daily 8/19/19   Curt Acevedo MD   vitamin D (ERGOCALCIFEROL) 71154 units CAPS capsule TK 1 C PO WEEKLY 6/2/19   Historical Provider, MD   flunisolide (NASALIDE) 25 MCG/ACT (0.025%) SOLN Inhale 2 sprays into the lungs every 12 hours 5/22/19   Maged Alford MD   Tiotropium Bromide-Olodaterol (STIOLTO RESPIMAT) 2.5-2.5 MCG/ACT AERS Inhale 2 puffs into the lungs daily 5/21/19   Maged Alford MD   Polyethylene Glycol 3350 GRAN  5/2/18   Historical Provider, MD   Glucose Blood (BLOOD GLUCOSE TEST STRIPS) STRP TEST 3-4 TIMES DAILY, AS DIRECTED 4/25/18   Dar Canales MD   Blood Glucose Monitoring Suppl ADAM USE AS DIRECTED. 4/25/18   Dar Canales MD   Alcohol Swabs PADS USE AS DIRECTED 4/25/18   Dar Canales MD   albuterol sulfate  (90 Base) MCG/ACT inhaler Inhale 2 puffs into the lungs every 6 hours as needed for Wheezing 11/8/17   Azam Chavez MD   ipratropium-albuterol (DUONEB) 0.5-2.5 (3) MG/3ML SOLN nebulizer solution Inhale 3 mLs into the lungs every 6 hours as needed for Shortness of Breath 10/15/17   Fernanda Blunt MD   Lancets MISC Test daily Rales/Wheezes/Rhonchi. Cardiovascular:  Regular rate and rhythm with normal S1/S2 without murmurs, rubs or gallops. Abdomen: Soft, non-tender, non-distended with normal bowel sounds. Musculoskeletal:  No clubbing, cyanosis or edema bilaterally. Redness and tenderness on right lower extremity toes and dorsum of the foot. No visible drainage. Noted a dry scabbed wound on the dorsal surface of distal ankle on the right lower extremity. Skin: Skin color, texture, turgor normal.  No rashes or lesions with the exception of the lesions described above on the right lower extremity. Neurologic:  Neurovascularly intact without any focal sensory/motor deficits. Cranial nerves: II-XII intact, grossly non-focal.  Psychiatric:  Alert and oriented, thought content appropriate, normal insight  Capillary Refill: Brisk,< 3 seconds   Peripheral Pulses: +2 palpable, equal bilaterally       Labs:     Recent Labs     01/29/21  1518   WBC 11.1*   HGB 10.6*   HCT 32.8*        Recent Labs     01/29/21  1518      K 4.1   CL 99   CO2 25   BUN 67*   CREATININE 5.4*   CALCIUM 9.3     No results for input(s): AST, ALT, BILIDIR, BILITOT, ALKPHOS in the last 72 hours. No results for input(s): INR in the last 72 hours. Recent Labs     01/29/21  1518   TROPONINI 0.07*       Urinalysis:      Lab Results   Component Value Date    NITRU Negative 06/05/2020    WBCUA 10-20 06/05/2020    BACTERIA 2+ 06/05/2020    RBCUA 0-2 06/05/2020    BLOODU TRACE-INTACT 06/05/2020    SPECGRAV 1.020 06/05/2020    GLUCOSEU 250 06/05/2020       Radiology:     CXR: I have reviewed the CXR with the following interpretation: Not done today  EKG:  I have reviewed the EKG with the following interpretation: Normal sinus rhythm. Left axis deviation. Prolonged QT    XR FOOT RIGHT (MIN 3 VIEWS)   Final Result   No acute osseous abnormality. ASSESSMENT:    There are no active hospital problems to display for this patient.         PLAN:    Right lower extremity cellulitis  Started about a week ago. Got progressively worse. Physical and laboratory findings are not consistent with sepsis. Does not have sepsis. Does not have septic shock. Starting on empiric IV antibiotics and will be admitted  Plain x-ray of the lower extremity is not showing any signs of underlying bone infection. As patient has a wound, podiatry is consulted to evaluate necessity for debridement. Wound care also consulted. Diabetes mellitus type 2 with hyperglycemia  Continue basal bolus insulin protocol with Lantus and sliding scale insulin    End-stage renal disease on hemodialysis  Missed hemodialysis today because of acute illness and emergency department visit  Nephrology consulted to arrange hemodialysis while in-house. Elevated troponin  Consistent with poor clearance associated with end-stage renal disease. In line with troponin levels obtained during past year. I will trend troponin to be sure there is no progression. If curve is flat, nothing else will be needed. Tobacco abuse  Unfortunately, patient is still an active smoker and is not interested in quitting smoking. Nicotine patch will be provided as inpatient. Peripheral diabetic neuropathy  Continue Lyrica at home dose    Dyslipidemia  Continue statin      DVT Prophylaxis: Subcutaneous heparin  Diet: Carbohydrate controlled diet  Code Status: Full code    PT/OT Eval Status: Requested    Dispo -inpatient stay, unclear duration       Pablo Croral MD    Thank you Dipti Rios MD for the opportunity to be involved in this patient's care. If you have any questions or concerns please feel free to contact me at [phone].

## 2021-01-30 LAB
A/G RATIO: 1.2 (ref 1.1–2.2)
ALBUMIN SERPL-MCNC: 3.6 G/DL (ref 3.4–5)
ALP BLD-CCNC: 84 U/L (ref 40–129)
ALT SERPL-CCNC: 10 U/L (ref 10–40)
ANION GAP SERPL CALCULATED.3IONS-SCNC: 14 MMOL/L (ref 3–16)
AST SERPL-CCNC: 10 U/L (ref 15–37)
BASOPHILS ABSOLUTE: 0.1 K/UL (ref 0–0.2)
BASOPHILS RELATIVE PERCENT: 0.8 %
BILIRUB SERPL-MCNC: <0.2 MG/DL (ref 0–1)
BUN BLDV-MCNC: 71 MG/DL (ref 7–20)
CALCIUM SERPL-MCNC: 8.6 MG/DL (ref 8.3–10.6)
CHLORIDE BLD-SCNC: 103 MMOL/L (ref 99–110)
CO2: 21 MMOL/L (ref 21–32)
CREAT SERPL-MCNC: 5.7 MG/DL (ref 0.9–1.3)
EKG ATRIAL RATE: 92 BPM
EKG DIAGNOSIS: NORMAL
EKG P AXIS: 58 DEGREES
EKG P-R INTERVAL: 182 MS
EKG Q-T INTERVAL: 388 MS
EKG QRS DURATION: 88 MS
EKG QTC CALCULATION (BAZETT): 479 MS
EKG R AXIS: -37 DEGREES
EKG T AXIS: 71 DEGREES
EKG VENTRICULAR RATE: 92 BPM
EOSINOPHILS ABSOLUTE: 0.3 K/UL (ref 0–0.6)
EOSINOPHILS RELATIVE PERCENT: 3.8 %
ESTIMATED AVERAGE GLUCOSE: 180 MG/DL
GFR AFRICAN AMERICAN: 13
GFR NON-AFRICAN AMERICAN: 10
GLOBULIN: 2.9 G/DL
GLUCOSE BLD-MCNC: 109 MG/DL (ref 70–99)
GLUCOSE BLD-MCNC: 160 MG/DL (ref 70–99)
GLUCOSE BLD-MCNC: 90 MG/DL (ref 70–99)
GLUCOSE BLD-MCNC: 92 MG/DL (ref 70–99)
HBA1C MFR BLD: 7.9 %
HCT VFR BLD CALC: 30.5 % (ref 40.5–52.5)
HEMOGLOBIN: 10 G/DL (ref 13.5–17.5)
LYMPHOCYTES ABSOLUTE: 1 K/UL (ref 1–5.1)
LYMPHOCYTES RELATIVE PERCENT: 12.6 %
MCH RBC QN AUTO: 28.9 PG (ref 26–34)
MCHC RBC AUTO-ENTMCNC: 32.7 G/DL (ref 31–36)
MCV RBC AUTO: 88.5 FL (ref 80–100)
MONOCYTES ABSOLUTE: 0.5 K/UL (ref 0–1.3)
MONOCYTES RELATIVE PERCENT: 6.9 %
NEUTROPHILS ABSOLUTE: 6 K/UL (ref 1.7–7.7)
NEUTROPHILS RELATIVE PERCENT: 75.9 %
PDW BLD-RTO: 15 % (ref 12.4–15.4)
PERFORMED ON: ABNORMAL
PERFORMED ON: ABNORMAL
PERFORMED ON: NORMAL
PLATELET # BLD: 272 K/UL (ref 135–450)
PMV BLD AUTO: 8.2 FL (ref 5–10.5)
POTASSIUM REFLEX MAGNESIUM: 3.7 MMOL/L (ref 3.5–5.1)
RBC # BLD: 3.44 M/UL (ref 4.2–5.9)
SODIUM BLD-SCNC: 138 MMOL/L (ref 136–145)
TOTAL PROTEIN: 6.5 G/DL (ref 6.4–8.2)
VANCOMYCIN RANDOM: 17 UG/ML
WBC # BLD: 7.9 K/UL (ref 4–11)

## 2021-01-30 PROCEDURE — 1200000000 HC SEMI PRIVATE

## 2021-01-30 PROCEDURE — 2580000003 HC RX 258: Performed by: INTERNAL MEDICINE

## 2021-01-30 PROCEDURE — 80053 COMPREHEN METABOLIC PANEL: CPT

## 2021-01-30 PROCEDURE — 36415 COLL VENOUS BLD VENIPUNCTURE: CPT

## 2021-01-30 PROCEDURE — 85025 COMPLETE CBC W/AUTO DIFF WBC: CPT

## 2021-01-30 PROCEDURE — 6370000000 HC RX 637 (ALT 250 FOR IP): Performed by: INTERNAL MEDICINE

## 2021-01-30 PROCEDURE — 6360000002 HC RX W HCPCS: Performed by: INTERNAL MEDICINE

## 2021-01-30 PROCEDURE — 2500000003 HC RX 250 WO HCPCS: Performed by: INTERNAL MEDICINE

## 2021-01-30 PROCEDURE — 5A1D70Z PERFORMANCE OF URINARY FILTRATION, INTERMITTENT, LESS THAN 6 HOURS PER DAY: ICD-10-PCS | Performed by: INTERNAL MEDICINE

## 2021-01-30 PROCEDURE — 93010 ELECTROCARDIOGRAM REPORT: CPT | Performed by: INTERNAL MEDICINE

## 2021-01-30 PROCEDURE — 90935 HEMODIALYSIS ONE EVALUATION: CPT

## 2021-01-30 PROCEDURE — 94660 CPAP INITIATION&MGMT: CPT

## 2021-01-30 PROCEDURE — 80202 ASSAY OF VANCOMYCIN: CPT

## 2021-01-30 RX ORDER — HEPARIN SODIUM 1000 [USP'U]/ML
4000 INJECTION, SOLUTION INTRAVENOUS; SUBCUTANEOUS PRN
Status: DISCONTINUED | OUTPATIENT
Start: 2021-01-30 | End: 2021-02-03 | Stop reason: HOSPADM

## 2021-01-30 RX ORDER — OXYCODONE AND ACETAMINOPHEN 7.5; 325 MG/1; MG/1
2 TABLET ORAL EVERY 4 HOURS PRN
Status: DISCONTINUED | OUTPATIENT
Start: 2021-01-30 | End: 2021-02-03 | Stop reason: HOSPADM

## 2021-01-30 RX ADMIN — CALCIUM ACETATE 667 MG: 667 CAPSULE ORAL at 06:34

## 2021-01-30 RX ADMIN — HYDRALAZINE HYDROCHLORIDE 50 MG: 50 TABLET, FILM COATED ORAL at 06:34

## 2021-01-30 RX ADMIN — QUETIAPINE FUMARATE 50 MG: 25 TABLET ORAL at 20:03

## 2021-01-30 RX ADMIN — HEPARIN SODIUM 5000 UNITS: 5000 INJECTION INTRAVENOUS; SUBCUTANEOUS at 23:48

## 2021-01-30 RX ADMIN — NEPHROCAP 1 MG: 1 CAP ORAL at 09:43

## 2021-01-30 RX ADMIN — ISOSORBIDE MONONITRATE 30 MG: 30 TABLET, EXTENDED RELEASE ORAL at 09:43

## 2021-01-30 RX ADMIN — TORSEMIDE 100 MG: 100 TABLET ORAL at 00:40

## 2021-01-30 RX ADMIN — PREGABALIN 25 MG: 25 CAPSULE ORAL at 14:37

## 2021-01-30 RX ADMIN — INSULIN GLARGINE 70 UNITS: 100 INJECTION, SOLUTION SUBCUTANEOUS at 00:41

## 2021-01-30 RX ADMIN — DULOXETINE HYDROCHLORIDE 30 MG: 30 CAPSULE, DELAYED RELEASE ORAL at 00:40

## 2021-01-30 RX ADMIN — INSULIN LISPRO 1 UNITS: 100 INJECTION, SOLUTION INTRAVENOUS; SUBCUTANEOUS at 23:49

## 2021-01-30 RX ADMIN — LOSARTAN POTASSIUM 50 MG: 25 TABLET, FILM COATED ORAL at 09:43

## 2021-01-30 RX ADMIN — CLOPIDOGREL BISULFATE 75 MG: 75 TABLET ORAL at 09:43

## 2021-01-30 RX ADMIN — Medication 10 ML: at 20:03

## 2021-01-30 RX ADMIN — OXYCODONE HYDROCHLORIDE AND ACETAMINOPHEN 1 TABLET: 7.5; 325 TABLET ORAL at 10:32

## 2021-01-30 RX ADMIN — OXYCODONE HYDROCHLORIDE AND ACETAMINOPHEN 2 TABLET: 7.5; 325 TABLET ORAL at 20:03

## 2021-01-30 RX ADMIN — OXYCODONE HYDROCHLORIDE AND ACETAMINOPHEN 1 TABLET: 7.5; 325 TABLET ORAL at 14:30

## 2021-01-30 RX ADMIN — PREGABALIN 25 MG: 25 CAPSULE ORAL at 09:43

## 2021-01-30 RX ADMIN — PRAVASTATIN SODIUM 80 MG: 80 TABLET ORAL at 20:03

## 2021-01-30 RX ADMIN — Medication 10 ML: at 09:44

## 2021-01-30 RX ADMIN — HEPARIN SODIUM 4000 UNITS: 1000 INJECTION INTRAVENOUS; SUBCUTANEOUS at 16:38

## 2021-01-30 RX ADMIN — INSULIN GLARGINE 70 UNITS: 100 INJECTION, SOLUTION SUBCUTANEOUS at 20:03

## 2021-01-30 RX ADMIN — HEPARIN SODIUM 5000 UNITS: 5000 INJECTION INTRAVENOUS; SUBCUTANEOUS at 06:34

## 2021-01-30 RX ADMIN — DULOXETINE HYDROCHLORIDE 30 MG: 30 CAPSULE, DELAYED RELEASE ORAL at 09:43

## 2021-01-30 RX ADMIN — PANTOPRAZOLE SODIUM 40 MG: 40 TABLET, DELAYED RELEASE ORAL at 06:34

## 2021-01-30 RX ADMIN — TORSEMIDE 100 MG: 100 TABLET ORAL at 09:43

## 2021-01-30 RX ADMIN — ASPIRIN 81 MG: 81 TABLET, CHEWABLE ORAL at 09:43

## 2021-01-30 RX ADMIN — OXYCODONE HYDROCHLORIDE AND ACETAMINOPHEN 1 TABLET: 7.5; 325 TABLET ORAL at 06:34

## 2021-01-30 RX ADMIN — HYDRALAZINE HYDROCHLORIDE 50 MG: 50 TABLET, FILM COATED ORAL at 23:48

## 2021-01-30 RX ADMIN — CITALOPRAM HYDROBROMIDE 40 MG: 20 TABLET ORAL at 09:43

## 2021-01-30 RX ADMIN — PREGABALIN 25 MG: 25 CAPSULE ORAL at 20:03

## 2021-01-30 RX ADMIN — OXYCODONE HYDROCHLORIDE AND ACETAMINOPHEN 1 TABLET: 7.5; 325 TABLET ORAL at 00:41

## 2021-01-30 ASSESSMENT — PAIN SCALES - GENERAL
PAINLEVEL_OUTOF10: 8
PAINLEVEL_OUTOF10: 7
PAINLEVEL_OUTOF10: 6

## 2021-01-30 NOTE — PROGRESS NOTES
"-- Message is from the [employer]--    Reason for Call: The patient mother is calling back regarding their original request for medication refill and school form form last week. She would like a call back. Caller Information       Type Contact Phone    01/29/2019 06:20 PM Phone (Incoming) Jay Kincaid (Mother) [phone]          Alternative phone number: [phone]    Turnaround time given to caller: ""This message will be sent to [facility] Provider's name]. The clinical team will fulfill your request as soon as they review your message when the office opens tomorrow. \""    " Per pt, uses a CPAP at night. Paged cross cover for order. Order placed. Called RT. Nay Menezes, RT aware and will bring up shortly. Pt aware.

## 2021-01-30 NOTE — PROGRESS NOTES
Patient admitted to room 351 from ER. Report from [facility]. Patient oriented to room, call light, bed rails, phone, lights and bathroom. Patient instructed about the schedule of the day including: vital sign frequency, lab draws, possible tests, frequency of MD and staff rounds, including RN/MD rounding together at bedside, daily weights, and I &O's. Patient instructed about prescribed diet, how to contact dietary, and television. Bed alarm in place, patient aware of placement and reason. Bed locked, in lowest position, side rails up 2/4, call light within reach. Will continue to monitor.

## 2021-01-30 NOTE — PROGRESS NOTES
01/30/21 0409   NIV Type   Equipment Type V60   Mode CPAP   Mask Type Full face mask   Mask Size Medium   Settings/Measurements   CPAP/EPAP 8 cmH2O   Resp 15   FiO2  21 %   Vt Exhaled 488 mL   Minute Volume 7 Liters   Mask Leak (lpm) 41 lpm   Comfort Level Good   Using Accessory Muscles No

## 2021-01-30 NOTE — PROGRESS NOTES
01/30/21 0006   NIV Type   $NIV $Daily Charge   Skin Protection for O2 Device Yes   Equipment Type V60   Mode CPAP   Mask Type Full face mask   Mask Size Medium   Settings/Measurements   CPAP/EPAP 8 cmH2O   Resp 17   FiO2  21 %   Vt Exhaled 417 mL   Minute Volume 7 Liters   Mask Leak (lpm) 10 lpm   Comfort Level Good   Using Accessory Muscles No   SpO2 96

## 2021-01-30 NOTE — PROGRESS NOTES
RESPIRATORY THERAPY ASSESSMENT    Name:  Juan Ramon Brown Record Number:  [id number]  Age: 44 y.o. Gender: male  : 1970  Today's Date:  2021  Room:  [facility]1-01    Assessment     Is the patient being admitted for a COPD or Asthma exacerbation? No   (If yes the patient will be seen every 4 hours for the first 24 hours and then reassessed)    Patient Admission Diagnosis      Allergies  Allergies   Allergen Reactions    Morphine Nausea And Vomiting       Minimum Predicted Vital Capacity:     n/a          Actual Vital Capacity:      n/a              Pulmonary History:COPD and CHF/Pulmonary Edema  Home Oxygen Therapy:  room air  Home Respiratory Therapy: Stiolto, Albuterol PRN   Current Respiratory Therapy:  Albuterol PRN          Respiratory Severity Index(RSI)   Patients with orders for inhalation medications, oxygen, or any therapeutic treatment modality will be placed on Respiratory Protocol. They will be assessed with the first treatment and at least every 72 hours thereafter. The following severity scale will be used to determine frequency of treatment intervention.     Smoking History: Pulmonary Disease or Smoking History, Greater than 15 pack year = 2    Social History  Social History     Tobacco Use    Smoking status: Current Every Day Smoker     Packs/day: 0.50     Years: 33.00     Pack years: 16.50     Types: Cigarettes     Last attempt to quit: 2020     Years since quittin.7    Smokeless tobacco: Never Used   Substance Use Topics    Alcohol use: Not Currently     Alcohol/week: 0.0 standard drinks     Comment: occ    Drug use: No       Recent Surgical History: None = 0  Past Surgical History  Past Surgical History:   Procedure Laterality Date    AORTIC VALVE REPLACEMENT N/A 10/15/2019    TRANSCATHETER AORTIC VALVE REPLACEMENT FEMORAL APPROACH performed by Bobo Moncada MD at [address] [place] [address] Right 2019    PERITONEAL DIALYSIS CATHETER REMOVAL performed by Xochitl Vega MD at [address]  2/29/2015    WNL    CORONARY ANGIOPLASTY WITH STENT PLACEMENT  05/26/15    CYST REMOVAL  08/14/2013    EXCISION CYSTS, NECK X2 AND ABDOMINAL benign    DIAGNOSTIC CARDIAC CATH LAB PROCEDURE      DIALYSIS FISTULA CREATION Left 10/30/2017    LEFT BRACHIAL CEPHALIC FISTULA    DIALYSIS FISTULA CREATION Left 3/27/2019    LIGATION  AV FISTULA performed by Raymundo Carpenter MD at [address], COLON, DIAGNOSTIC      OTHER SURGICAL HISTORY  02/01/2017    laparoscopic cholecystectomy with intraoperative cholangiogram    OTHER SURGICAL HISTORY  2018    PORT PLACEMENT  - vas cath    OTHER SURGICAL HISTORY Bilateral 06/26/2018    laprascopic peritoneal dialysis catheter placement    OTHER SURGICAL HISTORY Right 09/2018    peritoneal dialysis port placed on right side of abdomen    OTHER SURGICAL HISTORY  05/28/2019    PTA/Stenting R External Iliac artery    [place] LAP INSERTION TUNNELED INTRAPERITONEAL CATHETER N/A 9/21/2018    LAPAROSCOPIC PERITONEAL DIALYSIS CATHETER REPLACEMENT performed by Xochitl Vega MD at [address] [place] [address]  01/06/2016    UPPER GASTROINTESTINAL ENDOSCOPY  01/29/2017    possible candida, otherwise normal appearing    VASCULAR SURGERY  aprx 2 years ago    2 stents placed, each side of groin       Level of Consciousness: Alert, Oriented, and Cooperative = 0    Level of Activity: Walking unassisted = 0    Respiratory Pattern: Regular Pattern; RR 8-20 = 0    Breath Sounds: Diminshed bilaterally and/or crackles = 2    Sputum   ,  ,    Cough: Strong, spontaneous, non-productive = 0    Vital Signs   BP (!) 173/95   Pulse 86   Temp 98.5 °F (36.9 °C) (Oral)   Resp 16   Ht 5' 9\" (1.753 m)   Wt 242 lb (109.8 kg)   SpO2 100%   BMI 35.74 kg/m²   SPO2 (COPD values may differ): Greater than or equal to 92% on room air = 0    Peak Flow (asthma only): not applicable = 0    RSI: 0-4 = See once and convert to home regimen or discontinue and 5-6 = Q4hr PRN (every four hours as needed) for dyspnea        Plan       Goals: medication delivery, mobilize retained secretions, volume expansion and improve oxygenation    Patient/caregiver was educated on the proper method of use for Respiratory Care Devices:  No:       Level of patient/caregiver understanding able to:   ? Verbalize understanding   ? Demonstrate understanding       ? Teach back        ? Needs reinforcement       ? No available caregiver               ? Other:     Response to education:       Is patient being placed on Home Treatment Regimen? Yes     Does the patient have everything they need prior to discharge? NA     Comments: Chart reviewed, continue as ordered. Plan of Care: Albuterol PRN    Electronically signed by Carmen Guevara RCP on 1/29/2021 at 8:49 PM    Respiratory Protocol Guidelines     1. Assessment and treatment by Respiratory Therapy will be initiated for medication and therapeutic interventions upon initiation of aerosolized medication. 2. Physician will be contacted for respiratory rate (RR) greater than 35 breaths per minute. Therapy will be held for heart rate (HR) greater than 140 beats per minute, pending direction from physician. 3. Bronchodilators will be administered via Metered Dose Inhaler (MDI) with spacer when the following criteria are met:  a. Alert and cooperative     b. HR < 140 bpm  c. RR < 30 bpm                d. Can demonstrate a 2-3 second inspiratory hold  4. Bronchodilators will be administered via Hand Held Nebulizer PÉREZ [facility] to patients when ANY of the following criteria are met  a. Incognizant or uncooperative          b. Patients treated with HHN at Home        c. Unable to demonstrate proper use of MDI with spacer     d. RR > 30 bpm   5.  Bronchodilators will be delivered via Metered Dose Inhaler (MDI), HHN, Aerogen to intubated patients on mechanical ventilation. 6. Inhalation medication orders will be delivered and/or substituted as outlined below. Aerosolized Medications Ordering and Administration Guidelines:    1. All Medications will be ordered by a physician, and their frequency and/or modality will be adjusted as defined by the patients Respiratory Severity Index (RSI) score. 2. If the patient does not have documented COPD, consider discontinuing anticholinergics when RSI is less than 9.  3. If the bronchospasm worsens (increased RSI), then the bronchodilator frequency can be increased to a maximum of every 4 hours. If greater than every 4 hours is required, the physician will be contacted. 4. If the bronchospasm improves, the frequency of the bronchodilator can be decreased, based on the patient's RSI, but not less than home treatment regimen frequency. 5. Bronchodilator(s) will be discontinued if patient has a RSI less than 9 and has received no scheduled or as needed treatment for 72  Hrs. Patients Ordered on a Mucolytic Agent:    1. Must always be administered with a bronchodilator. 2. Discontinue if patient experiences worsened bronchospasm, or secretions have lessened to the point that the patient is able to clear them with a cough. Anti-inflammatory and Combination Medications:    1. If the patient lacks prior history of lung disease, is not using inhaled anti-inflammatory medication at home, and lacks wheezing by examination or by history for at least 24 hours, contact physician for possible discontinuation.

## 2021-01-30 NOTE — PROGRESS NOTES
Hospitalist Progress Note      PCP: Anna Elkins MD    Date of Admission: 1/29/2021    Chief Complaint:   Wound and redness at right lower extremity    Hospital Course:    44 y.o. male with PMH significant for ESRD, on hemodialysis 3 times/week, hypertension, diabetes mellitus type 2/insulin requiring, NICM,  Depression, PAD s/p stents, ZAINAB on CPAP. He presented to [facility] with right foot wound and swelling. About a week and a half prior to this admission patient noticed sores on his right foot and toes. When he removed his sock, patch of skin fell off and noticed that it was bleeding. He did not seek medical attention at that time. The day of admission patient missed his dialysis appointment and went to the primary care physician. From the primary care office, patient was directed to the emergency department for further evaluation. In the emergency department, patient was diagnosed with right lower extremity cellulitis. X-ray of the right foot did not show osteomyelitis. Patient was started on empiric antibiotics and admitted. Subjective: He is lying in bed, he complains of pain in his right foot.   Denies any chest pain or shortness of breath      Medications:  Reviewed    Infusion Medications    dextrose       Scheduled Medications    aspirin  81 mg Oral Daily    Calcium Acetate (Phos Binder)  1 capsule Oral Daily    citalopram  40 mg Oral Daily    clopidogrel  75 mg Oral Daily    DULoxetine  30 mg Oral Daily    fluticasone  2 spray Each Nostril BID    hydrALAZINE  50 mg Oral 3 times per day    insulin glargine  70 Units Subcutaneous Nightly    isosorbide mononitrate  30 mg Oral Daily    linaclotide  145 mcg Oral QAM AC    losartan  50 mg Oral Daily    pantoprazole  40 mg Oral QAM AC    pravastatin  80 mg Oral Nightly    pregabalin  25 mg Oral TID    QUEtiapine  50 mg Oral QPM    torsemide  100 mg Oral Daily    [START ON 1/31/2021] vitamin D  50,000 Units Oral Weekly    sodium chloride flush  10 mL Intravenous 2 times per day    nicotine  1 patch Transdermal Daily    heparin (porcine)  5,000 Units Subcutaneous 3 times per day    insulin lispro  0-12 Units Subcutaneous TID WC    insulin lispro  0-6 Units Subcutaneous Nightly    [START ON 1/31/2021] cefepime  1,000 mg Intravenous Q24H    vancomycin (VANCOCIN) intermittent dosing (placeholder)   Other RX Placeholder    b complex-C-folic acid  1 capsule Oral Daily     PRN Meds: albuterol, nitroGLYCERIN, oxyCODONE-acetaminophen, glucose, dextrose, glucagon (rDNA), dextrose, sodium chloride flush, promethazine **OR** ondansetron, polyethylene glycol, acetaminophen **OR** acetaminophen      Intake/Output Summary (Last 24 hours) at 1/30/2021 1520  Last data filed at 1/30/2021 0936  Gross per 24 hour   Intake 70 ml   Output 400 ml   Net -330 ml       Physical Exam Performed:    BP (!) 146/81   Pulse 83   Temp 98.1 °F (36.7 °C) (Oral)   Resp 16   Ht 5' 9\" (1.753 m)   Wt 242 lb (109.8 kg)   SpO2 98%   BMI 35.74 kg/m²     General appearance: He is lying in bed, appears to be in mild distress due to pain in his right foot, is alert and  cooperative. HEENT: Pupils equal, round, and reactive to light. Conjunctivae/corneas clear. Neck: Supple, with full range of motion. No jugular venous distention. Trachea midline. Respiratory:  Normal respiratory effort. Clear to auscultation, bilaterally without Rales/Wheezes/Rhonchi. Cardiovascular: Regular rate and rhythm with normal S1/S2   Abdomen: Soft, non-tender, non-distended with normal bowel sounds. Musculoskeletal: No clubbing, cyanosis or edema   Redness and tenderness on right lower extremity toes and dorsum of the foot. No visible drainage. Noted a dry scabbed wound on the dorsal surface of distal ankle on the right lower extremity. Neurologic:  Neurovascularly intact without any focal sensory/motor deficits.  Cranial nerves: II-XII intact, grossly non-focal.  Psychiatric: Alert and oriented, thought content appropriate, normal insight  Capillary Refill: Brisk,< 3 seconds   Peripheral Pulses: +2 palpable, equal bilaterally       Labs:   Recent Labs     01/29/21  1518 01/30/21  0747   WBC 11.1* 7.9   HGB 10.6* 10.0*   HCT 32.8* 30.5*    272     Recent Labs     01/29/21  1518 01/30/21  0747    138   K 4.1 3.7   CL 99 103   CO2 25 21   BUN 67* 71*   CREATININE 5.4* 5.7*   CALCIUM 9.3 8.6     Recent Labs     01/30/21  0747   AST 10*   ALT 10   BILITOT <0.2   ALKPHOS 84     No results for input(s): INR in the last 72 hours. Recent Labs     01/29/21  1518 01/29/21  1919 01/29/21  2319   TROPONINI 0.07* 0.06* 0.06*       Urinalysis:      Lab Results   Component Value Date    NITRU Negative 06/05/2020    WBCUA 10-20 06/05/2020    BACTERIA 2+ 06/05/2020    RBCUA 0-2 06/05/2020    BLOODU TRACE-INTACT 06/05/2020    SPECGRAV 1.020 06/05/2020    GLUCOSEU 250 06/05/2020       Radiology:  XR FOOT RIGHT (MIN 3 VIEWS)   Final Result   No acute osseous abnormality. VL DUP LOWER EXTREMITY ARTERIES BILATERAL    (Results Pending)           Assessment/Plan:    Active Hospital Problems    Diagnosis    Cellulitis of right foot [O52.370]     Right lower extremity cellulitis  Started about a week ago. Got progressively worse. Physical and laboratory findings are not consistent with sepsis. Continue on empiric IV antibiotics and she will follow closely. Plain x-ray of the lower extremity is not showing any signs of underlying bone infection. podiatry is consulted for further input. He did undergo bedside debridement of right hallux ulcer on 1/30/2021. Arterial duplex also ordered.    will follow closely  Wound care also consulted.     Diabetes mellitus type 2 with hyperglycemia  Continue basal bolus insulin protocol with Lantus and sliding scale insulin     End-stage renal disease on hemodialysis  Missed hemodialysis today because of acute illness and emergency department visit  Nephrology consulted to arrange hemodialysis while in-house.     Elevated troponin  Consistent with poor clearance associated with end-stage renal disease. In line with troponin levels obtained during past year. I will trend troponin to be sure there is no progression. If curve is flat, nothing else will be needed. He denies any chest pain at this time.     Tobacco abuse  Unfortunately, patient is still an active smoker and is not interested in quitting smoking.   Nicotine patch will be provided as inpatient.     Peripheral diabetic neuropathy  Continue Lyrica at home dose     Dyslipidemia  Continue statin       DVT Prophylaxis: Heparin  Diet: DIET CARB CONTROL; Carb Control: 5 carb choices (75 gms)/meal  Code Status: Full Code    PT/OT Eval Status: Not ordered yet    Dispo -2 to 3 days    Yuliya Betancourt MD

## 2021-01-30 NOTE — CONSULTS
[facility]    Consult Note           Reason for Consult:  ESRD  Requesting Physician:  Dr. Meredith Mauro    Chief Complaint:    Chief Complaint   Patient presents with    Foot Pain     pt with sores on his foot several and with pain         History of Present Illness on 1/30/21:    44 y.o. yo male with PMH of ESRD, DM who is admitted for right feet wound  Pt reports that he had increased pain and swelling of the right foot . He saw his PCP and was asked to go to hospital. He missed his dialysis yesterday.     Past Medical History:        Diagnosis Date    Ambulatory dysfunction     walker for long distances, SOB with distance    Aortic stenosis     echo 2017    Arthritis     hands and hips    Asthma     Bilateral hilar adenopathy syndrome 6/3/2013    CAD (coronary artery disease)     Dr. Mary Aragon [facility]) 04/19/2019    EF= 43%    CHF (congestive heart failure) (HCC)     Chronic pain     COPD (chronic obstructive pulmonary disease) ([address]     pulmonology Dr. Yohannes Elias    Depression     Diabetes mellitus ([address]     borderline    Difficult intravenous access     Emphysema of lung ([address]     ESRD (end stage renal disease) on dialysis ([address]     MWF    Fear of needles     Gastric ulcer     GERD (gastroesophageal reflux disease)     Heart valve problem     bicuspic valve    Hemodialysis patient ([address]     History of spinal fracture     work incident    Hx of blood clots     Bilateral lower extremities; stents in place    Hyperlipidemia     Hypertension     MI (myocardial infarction) ([address] 2019    has had 9 MIs. 2019 was the last    Neuromuscular disorder ([address]     due to CVA    Numbness and tingling in left arm     from fistula    Pneumonia     PONV (postoperative nausea and vomiting)     Prolonged emergence from general anesthesia     States requires more medication than most people    Sleep apnea     Uses CPAP    Stroke ([address]     7mm thalamic cva 2017 deficts left side, left side weakness    TIA (transient ischemic attack)     Unspecified diseases of blood and blood-forming organs        Past Surgical History:        Procedure Laterality Date    AORTIC VALVE REPLACEMENT N/A 10/15/2019    TRANSCATHETER AORTIC VALVE REPLACEMENT FEMORAL APPROACH performed by Vadim Donis MD at [address] Right 7/2/2019    PERITONEAL DIALYSIS CATHETER REMOVAL performed by Eloise Cruz MD at [facility]  2/29/2015    WNL    CORONARY ANGIOPLASTY WITH STENT PLACEMENT  05/26/15    CYST REMOVAL  08/14/2013    EXCISION CYSTS, NECK X2 AND ABDOMINAL benign    DIAGNOSTIC CARDIAC CATH LAB PROCEDURE      DIALYSIS FISTULA CREATION Left 10/30/2017    LEFT BRACHIAL CEPHALIC FISTULA    DIALYSIS FISTULA CREATION Left 3/27/2019    LIGATION  AV FISTULA performed by Ruchi Izaguirre MD at [address], COLON, DIAGNOSTIC      OTHER SURGICAL HISTORY  02/01/2017    laparoscopic cholecystectomy with intraoperative cholangiogram    OTHER SURGICAL HISTORY  2018    PORT PLACEMENT  - vas cath    OTHER SURGICAL HISTORY Bilateral 06/26/2018    laprascopic peritoneal dialysis catheter placement    OTHER SURGICAL HISTORY Right 09/2018    peritoneal dialysis port placed on right side of abdomen    OTHER SURGICAL HISTORY  05/28/2019    PTA/Stenting R External Iliac artery    [place] LAP INSERTION TUNNELED INTRAPERITONEAL CATHETER N/A 9/21/2018    LAPAROSCOPIC PERITONEAL DIALYSIS CATHETER REPLACEMENT performed by Eloise Cruz MD at [facility]  01/06/2016    UPPER GASTROINTESTINAL ENDOSCOPY  01/29/2017    possible candida, otherwise normal appearing    VASCULAR SURGERY  aprx 2 years ago    2 stents placed, each side of groin       Home Medications:    No current facility-administered medications on file prior to encounter.       Current Outpatient Medications on File Prior to Encounter   Medication Sig Dispense Refill    oxyCODONE-acetaminophen (PERCOCET) 7.5-325 MG per tablet Take 1 tablet by mouth every 4 hours as needed for Pain.  glucose monitoring kit (FREESTYLE) monitoring kit 1 kit by Does not apply route daily 1 kit 0    blood glucose test strips (GLUCOSE METER TEST) strip 1 each by In Vitro route 5 times daily As needed. 100 each 3    nitroGLYCERIN (NITROSTAT) 0.4 MG SL tablet DISSOLVE 1 TABLET UNDER THE TONGUE AS NEEDED FOR CHEST PAIN EVERY 5 MINUTES UP TO 3 TIMES. IF NO RELIEF CALL 911. 25 tablet 10    Insulin Pen Needle 31G X 5 MM MISC 1 each by Does not apply route 4 times daily 300 each 3    hydrOXYzine (VISTARIL) 50 MG capsule TAKE 1 TO 2 CAPSULES BY MOUTH NIGHTLY 60 capsule 5    pregabalin (LYRICA) 25 MG capsule Take 1 capsule by mouth 3 times daily for 5 days.  15 capsule 0    lidocaine 4 % external patch Place 1 patch onto the skin daily 30 patch 0    hydrALAZINE (APRESOLINE) 50 MG tablet TAKE 1/2 TABLET BY MOUTH EVERY 8 HOURS 90 tablet 2    B Complex-C-Folic Acid (VIRT-CAPS) 1 MG CAPS TK ONE C PO  QD 90 capsule 1    Continuous Blood Gluc Sensor (FREESTYLE STEPHANY 14 DAY SENSOR) MISC 1 each by Does not apply route every 14 days 6 each 3    insulin glargine (BASAGLAR KWIKPEN) 100 UNIT/ML injection pen Inject 70 Units into the skin nightly 15 mL 5    traZODone (DESYREL) 150 MG tablet TAKE (1) TABLET BY MOUTH NIGHTLY 30 tablet 10    citalopram (CELEXA) 40 MG tablet TAKE (1) TABLET BY MOUTH DAILY 30 tablet 10    insulin aspart (NOVOLOG FLEXPEN) 100 UNIT/ML injection pen Inject 20 Units into the skin 3 times daily (before meals) 15 pen 5    clopidogrel (PLAVIX) 75 MG tablet TAKE 1 TABLET BY MOUTH ONCE DAILY 90 tablet 3    pravastatin (PRAVACHOL) 80 MG tablet TAKE (1) TABLET BY MOUTH ONCE DAILY 90 tablet 3    QUEtiapine (SEROQUEL) 50 MG tablet TAKE 1 TABLET BY MOUTH EVERY EVENING 30 tablet 5    isosorbide mononitrate (IMDUR) 30 MG extended release tablet TAKE 1 TABLET BY MOUTH EVERY DAY 90 tablet 10    torsemide (DEMADEX) 100 MG tablet Take 1-2 tablets by mouth daily 180 tablet 3    ondansetron (ZOFRAN ODT) 4 MG disintegrating tablet Take 1 tablet by mouth every 8 hours as needed for Nausea 60 tablet 0    LINZESS 145 MCG capsule TAKE 1 CAPSULE BY MOUTH EVERY MORNING BEFORE BREAKFAST 30 capsule 10    Calcium Acetate, Phos Binder, 667 MG CAPS TAKE 1 CAPSULE BY MOUTH THREE TIMES DAILY WITH MEALS 90 capsule 3    tiZANidine (ZANAFLEX) 4 MG tablet TAKE 1 TABLET BY MOUTH THREE TIMES DAILY 90 tablet 10    DULoxetine (CYMBALTA) 30 MG extended release capsule TAKE 1 CAPSULE BY MOUTH EVERY DAY 90 capsule 10    nystatin-triamcinolone (MYCOLOG II) 407308-9.1 UNIT/GM-% cream Apply topically 2 times daily. 60 g 2    losartan (COZAAR) 50 MG tablet TAKE (1) TABLET BY MOUTH DAILY 90 tablet 10    pantoprazole (PROTONIX) 40 MG tablet TAKE (1) TABLET BY MOUTH EACH MORNING BEFORE BREAKFAST 90 tablet 10    albuterol (PROVENTIL) (2.5 MG/3ML) 0.083% nebulizer solution INHALE 1 VIAL VIA NEBULIZER EVERY 6 HOURS AS NEEDED FOR WHEEZING 300 mL 10    nystatin (MYCOSTATIN) 361658 UNIT/GM cream Apply topically 2 times daily. 60 g 3    Insulin Syringe-Needle U-100 30G X 1/2\" 0.5 ML MISC 1 each by Does not apply route daily 100 each 3    blood glucose test strips (FREESTYLE LITE) strip Daily As needed.  100 strip 3    glucose monitoring kit (FREESTYLE) monitoring kit 1 kit by Does not apply route daily 1 kit 0    vitamin D (ERGOCALCIFEROL) 60598 units CAPS capsule TK 1 C PO WEEKLY  11    flunisolide (NASALIDE) 25 MCG/ACT (0.025%) SOLN Inhale 2 sprays into the lungs every 12 hours 1 Bottle 5    Tiotropium Bromide-Olodaterol (STIOLTO RESPIMAT) 2.5-2.5 MCG/ACT AERS Inhale 2 puffs into the lungs daily 2 Inhaler 0    Polyethylene Glycol 3350 GRAN       Glucose Blood (BLOOD GLUCOSE TEST STRIPS) STRP TEST 3-4 TIMES DAILY, AS DIRECTED 100 strip 3    Blood Glucose Monitoring Suppl ADAM USE AS DIRECTED. 1 Device 0    Alcohol Swabs PADS USE AS DIRECTED 300 each 3    albuterol sulfate  (90 Base) MCG/ACT inhaler Inhale 2 puffs into the lungs every 6 hours as needed for Wheezing 1 Inhaler 3    ipratropium-albuterol (DUONEB) 0.5-2.5 (3) MG/3ML SOLN nebulizer solution Inhale 3 mLs into the lungs every 6 hours as needed for Shortness of Breath 360 mL 1    Lancets MISC Test daily 100 each 3    calcium carbonate (TUMS) 500 MG chewable tablet Take 1 tablet by mouth 3 times daily as needed for Heartburn.  aspirin 81 MG chewable tablet Take 1 tablet by mouth daily.  (Patient taking differently: Take 81 mg by mouth daily Indications: stopped on  for surgery ) 30 tablet 2       Allergies:  Morphine    Social History:    Social History     Socioeconomic History    Marital status:      Spouse name: Not on file    Number of children: Not on file    Years of education: Not on file    Highest education level: Not on file   Occupational History    Not on file   Social Needs    Financial resource strain: Not on file    Food insecurity     Worry: Not on file     Inability: Not on file    Transportation needs     Medical: Not on file     Non-medical: Not on file   Tobacco Use    Smoking status: Current Every Day Smoker     Packs/day: 0.50     Years: 33.00     Pack years: 16.50     Types: Cigarettes     Last attempt to quit: 2020     Years since quittin.7    Smokeless tobacco: Never Used   Substance and Sexual Activity    Alcohol use: Not Currently     Alcohol/week: 0.0 standard drinks     Comment: occ    Drug use: No    Sexual activity: Yes     Partners: Female     Comment:    Lifestyle    Physical activity     Days per week: Not on file     Minutes per session: Not on file    Stress: Not on file   Relationships    Social connections     Talks on phone: Not on file     Gets together: Not on file     Attends Hinduism service: Not on file     Active member of club or organization: Not on file     Attends meetings of clubs or organizations: Not on file     Relationship status: Not on file    Intimate partner violence     Fear of current or ex partner: Not on file     Emotionally abused: Not on file     Physically abused: Not on file     Forced sexual activity: Not on file   Other Topics Concern    Not on file   Social History Narrative    Not on file       Family History:   Family History   Problem Relation Age of Onset    Diabetes Mother     Heart Disease Father     Kidney Disease Sister         stage 4-kidney failure    Cancer Sister     Heart Disease Sister     Obesity Sister     Cancer Sister     Heart Disease Sister     Obesity Sister     Alcohol Abuse Brother        Review of Systems:   Pertinent positives stated above in HPI. All other 10 systems were reviewed and were negative.     Physical exam:   Constitutional:  VITALS:  /62   Pulse 81   Temp 97.8 °F (36.6 °C)   Resp 16   Ht 5' 9\" (1.753 m)   Wt 263 lb 10.7 oz (119.6 kg)   SpO2 98%   BMI 38.94 kg/m²   Gen: alert, awake, nad  Skin: no rash, turgor wnl  Heent:  eomi, mmm  Neck: no bruits or jvd noted, thyroid normal  Cardiovascular:  S1, S2 without m/r/g; trace lower extremity edema; right foot wound  Respiratory: CTA B without w/r/r; respiratory effort normal  Abdomen:  +bs, soft, nt, nd, no hepatosplenomegaly  Neuro/Psy: AAoriented times 3 ; moves all 4 ext  Musculoskeletal:  Rom, muscular strength intact; digits, nails normal  [facility]    Data/  Recent Labs     01/29/21  1518 01/30/21  0747   WBC 11.1* 7.9   HGB 10.6* 10.0*   HCT 32.8* 30.5*   MCV 88.9 88.5    272     Recent Labs     01/29/21  1518 01/30/21  0747    138   K 4.1 3.7   CL 99 103   CO2 25 21   GLUCOSE 180* 92   BUN 67* 71*   CREATININE 5.4* 5.7*   LABGLOM 11* 10*   GFRAA 14* 13*       Assessment  -ESRD on HD MWF  -Right foot cellulitis  -Anemia  -CKD-MBD    Plan  -HD on 1/30 and then per MWF schedule  -iv abx per primary team  -Serial renal panel  -daily wts and strict i/o  -renal dose medications   -avoid nephrotoxins    Thank you for the consultation. Please do not hesitate to call with questions.     Sivan Robledo  [facility]  Office: [phone]  Fax:    [phone]

## 2021-01-30 NOTE — CONSULTS
Podiatry Consult Note    History of Present Illness: The patient is 44year old gentleman with DM2, neuropathy, PVD and a history of smoking. Podiatry was asked to see him for right foot cellulitis. About two or three weeks ago, he was in the hospital.  At that time, he had significant swelling and his socks cut into his ankles. That has not healed. He also has a painful wound on his right great toe that has not healed after several months. He is not sure why these areas won't heal.  He was recently at his PCP's office. They saw his feet and sent him to the hospital for further evaluation. Marina Granger was admitted with cellulitis at the right lower extremity. At the time of encounter, he denied n/v/f/c and SOB.     Past Medical History:        Diagnosis Date    Ambulatory dysfunction     walker for long distances, SOB with distance    Aortic stenosis     echo 2017    Arthritis     hands and hips    Asthma     Bilateral hilar adenopathy syndrome 6/3/2013    CAD (coronary artery disease)     Dr. Alejandra K[facility]) 04/19/2019    EF= 43%    CHF (congestive heart failure) (HCC)     Chronic pain     COPD (chronic obstructive pulmonary disease) ([address]     pulmonology Dr. Jose A Montanez    Depression     Diabetes mellitus ([address]     borderline    Difficult intravenous access     Emphysema of lung ([address]     ESRD (end stage renal disease) on dialysis ([address]     MWF    Fear of needles     Gastric ulcer     GERD (gastroesophageal reflux disease)     Heart valve problem     bicuspic valve    Hemodialysis patient ([address]     History of spinal fracture     work incident    Hx of blood clots     Bilateral lower extremities; stents in place    Hyperlipidemia     Hypertension     MI (myocardial infarction) ([address] 2019    has had 9 MIs. 2019 was the last    Neuromuscular disorder ([address]     due to CVA    Numbness and tingling in left arm     from fistula    Pneumonia     PONV (postoperative nausea and vomiting)     Prolonged emergence from general anesthesia     States requires more medication than most people    Sleep apnea     Uses CPAP    Stroke ([facility]     7mm thalamic cva 2017 deficts left side, left side weakness    TIA (transient ischemic attack)     Unspecified diseases of blood and blood-forming organs        Past Surgical History:        Procedure Laterality Date    AORTIC VALVE REPLACEMENT N/A 10/15/2019    TRANSCATHETER AORTIC VALVE REPLACEMENT FEMORAL APPROACH performed by Elise Pritchett MD at [address] [place] [address] Ave Right 7/2/2019    PERITONEAL DIALYSIS CATHETER REMOVAL performed by Varun Serna MD at [facility] COLONOSCOPY  2/29/2015    WN    CORONARY ANGIOPLASTY WITH STENT PLACEMENT  05/26/15    CYST REMOVAL  08/14/2013    EXCISION CYSTS, NECK X2 AND ABDOMINAL benign    DIAGNOSTIC CARDIAC CATH LAB PROCEDURE      DIALYSIS FISTULA CREATION Left 10/30/2017    LEFT BRACHIAL CEPHALIC FISTULA    DIALYSIS FISTULA CREATION Left 3/27/2019    LIGATION  AV FISTULA performed by Josh Mccormick MD at [facility], COLON, DIAGNOSTIC      OTHER SURGICAL HISTORY  02/01/2017    laparoscopic cholecystectomy with intraoperative cholangiogram    OTHER SURGICAL HISTORY  2018    PORT PLACEMENT  - vas cath    OTHER SURGICAL HISTORY Bilateral 06/26/2018    laprascopic peritoneal dialysis catheter placement    OTHER SURGICAL HISTORY Right 09/2018    peritoneal dialysis port placed on right side of abdomen    OTHER SURGICAL HISTORY  05/28/2019    PTA/Stenting R External Iliac artery    RI LAP INSERTION TUNNELED INTRAPERITONEAL CATHETER N/A 9/21/2018    LAPAROSCOPIC PERITONEAL DIALYSIS CATHETER REPLACEMENT performed by Varun Serna MD at [address] [place] [address]  01/06/2016    UPPER GASTROINTESTINAL ENDOSCOPY  01/29/2017    possible candida, otherwise normal appearing    VASCULAR SURGERY  aprx 2 years ago    2 stents placed, each side of groin       Current Medications:    Current Facility-Administered Medications: albuterol (PROVENTIL) nebulizer solution 2.5 mg, 2.5 mg, Nebulization, Q4H PRN  aspirin chewable tablet 81 mg, 81 mg, Oral, Daily  Calcium Acetate (Phos Binder) CAPS 667 mg, 1 capsule, Oral, Daily  citalopram (CELEXA) tablet 40 mg, 40 mg, Oral, Daily  clopidogrel (PLAVIX) tablet 75 mg, 75 mg, Oral, Daily  DULoxetine (CYMBALTA) extended release capsule 30 mg, 30 mg, Oral, Daily  fluticasone (FLONASE) 50 MCG/ACT nasal spray 2 spray, 2 spray, Each Nostril, BID  hydrALAZINE (APRESOLINE) tablet 50 mg, 50 mg, Oral, 3 times per day  insulin glargine (LANTUS) injection vial 70 Units, 70 Units, Subcutaneous, Nightly  isosorbide mononitrate (IMDUR) extended release tablet 30 mg, 30 mg, Oral, Daily  linaclotide (LINZESS) capsule 145 mcg, 145 mcg, Oral, QAM AC  losartan (COZAAR) tablet 50 mg, 50 mg, Oral, Daily  nitroGLYCERIN (NITROSTAT) SL tablet 0.4 mg, 0.4 mg, Sublingual, Q5 Min PRN  oxyCODONE-acetaminophen (PERCOCET) 7.5-325 MG per tablet 1 tablet, 1 tablet, Oral, Q4H PRN  pantoprazole (PROTONIX) tablet 40 mg, 40 mg, Oral, QAM AC  pravastatin (PRAVACHOL) tablet 80 mg, 80 mg, Oral, Nightly  pregabalin (LYRICA) capsule 25 mg, 25 mg, Oral, TID  QUEtiapine (SEROQUEL) tablet 50 mg, 50 mg, Oral, QPM  torsemide (DEMADEX) tablet 100 mg, 100 mg, Oral, Daily  [START ON 1/31/2021] vitamin D (ERGOCALCIFEROL) capsule 50,000 Units, 50,000 Units, Oral, Weekly  glucose (GLUTOSE) 40 % oral gel 15 g, 15 g, Oral, PRN  dextrose 50 % IV solution, 12.5 g, Intravenous, PRN  glucagon (rDNA) injection 1 mg, 1 mg, Intramuscular, PRN  dextrose 5 % solution, 100 mL/hr, Intravenous, PRN  sodium chloride flush 0.9 % injection 10 mL, 10 mL, Intravenous, 2 times per day  sodium chloride flush 0.9 % injection 10 mL, 10 mL, Intravenous, PRN  promethazine (PHENERGAN) tablet 12.5 mg, 12.5 mg, Oral, Q6H PRN **OR** ondansetron (ZOFRAN) injection 4 mg, 4 mg, Intravenous, Q6H PRN  polyethylene glycol (GLYCOLAX) packet 17 g, 17 g, Oral, Daily PRN  acetaminophen (TYLENOL) tablet 650 mg, 650 mg, Oral, Q6H PRN **OR** acetaminophen (TYLENOL) suppository 650 mg, 650 mg, Rectal, Q6H PRN  nicotine (NICODERM CQ) 14 MG/24HR 1 patch, 1 patch, Transdermal, Daily  heparin (porcine) injection 5,000 Units, 5,000 Units, Subcutaneous, 3 times per day  insulin lispro (HUMALOG) injection vial 0-12 Units, 0-12 Units, Subcutaneous, TID WC  insulin lispro (HUMALOG) injection vial 0-6 Units, 0-6 Units, Subcutaneous, Nightly  [START ON 2021] cefepime (MAXIPIME) 1000 mg IVPB minibag, 1,000 mg, Intravenous, Q24H  vancomycin (VANCOCIN) intermittent dosing (placeholder), , Other, RX Placeholder  b complex-C-folic acid (NEPHROCAPS) capsule 1 mg, 1 capsule, Oral, Daily    Allergies:  Morphine    Social History:    Social History     Socioeconomic History    Marital status:      Spouse name: Not on file    Number of children: Not on file    Years of education: Not on file    Highest education level: Not on file   Occupational History    Not on file   Social Needs    Financial resource strain: Not on file    Food insecurity     Worry: Not on file     Inability: Not on file    Transportation needs     Medical: Not on file     Non-medical: Not on file   Tobacco Use    Smoking status: Current Every Day Smoker     Packs/day: 0.50     Years: 33.00     Pack years: 16.50     Types: Cigarettes     Last attempt to quit: 2020     Years since quittin.7    Smokeless tobacco: Never Used   Substance and Sexual Activity    Alcohol use: Not Currently     Alcohol/week: 0.0 standard drinks     Comment: occ    Drug use: No    Sexual activity: Yes     Partners: Female     Comment:    Lifestyle    Physical activity     Days per week: Not on file     Minutes per session: Not on file    Stress: Not on file   Relationships    Social connections     Talks on phone: Not on file     Gets together: Not on file     Attends Zoroastrian service: Not on file     Active member of club or organization: Not on file     Attends meetings of clubs or organizations: Not on file     Relationship status: Not on file    Intimate partner violence     Fear of current or ex partner: Not on file     Emotionally abused: Not on file     Physically abused: Not on file     Forced sexual activity: Not on file   Other Topics Concern    Not on file   Social History Narrative    Not on file       Family History:   Breast Cancer-related family history is not on file. Review of Systems:    CONSTITUTIONAL:  negative  EYES:  negative  HEENT:  negative  RESPIRATORY:  negative  CARDIOVASCULAR:  negative  GASTROINTESTINAL:  negative  GENITOURINARY:  negative  MUSCULOSKELETAL:  negative    Physical Exam:    Vitals:    /75   Pulse 75   Temp 97.7 °F (36.5 °C) (Oral)   Resp 14   Ht 5' 9\" (1.753 m)   Wt 242 lb (109.8 kg)   SpO2 95%   BMI 35.74 kg/m²   Integument:  Eschar seen at the anterior right and left ankle as well as the medial right hallux and dorsal right fifth toe. Minimal erythema. No active drainage. No proximal red streaking. No fluctuance. No evidence of deep space abscess. Nails: The medial border of the right hallux toe nail is ingrown. Neurologic:  Protective sensation is grossly diminished to light touch at the level of the toes, bilateral.  Vascular:  DP and PT pulses are weakly palpable, bilateral.  CFT is less than five seconds at all toes. Mild edema at both feet  Musculoskeletal:  Patient has 5/5 strength on inversion/everison/dorsiflexion/plantarflexion, bilateral.  No gross musculoskeletal deformities noted. Pain on palpation at the medial border of the right hallux toe nail.      Labs:  CBC with Differential:    Lab Results   Component Value Date    WBC 7.9 01/30/2021    RBC 3.44 01/30/2021    HGB 10.0 01/30/2021    HCT 30.5 01/30/2021     01/30/2021    MCV 88.5 01/30/2021    MCH 28.9 01/30/2021    MCHC 32.7 01/30/2021    RDW 15.0 01/30/2021    SEGSPCT 73.4 05/17/2013    BANDSPCT 5 12/21/2020    METASPCT 1 12/21/2020    LYMPHOPCT 12.6 01/30/2021    MONOPCT 6.9 01/30/2021    MYELOPCT 0 12/13/2020    BASOPCT 0.8 01/30/2021    MONOSABS 0.5 01/30/2021    LYMPHSABS 1.0 01/30/2021    EOSABS 0.3 01/30/2021    BASOSABS 0.1 01/30/2021    DIFFTYPE Auto 05/17/2013     CMP:    Lab Results   Component Value Date     01/30/2021    K 3.7 01/30/2021     01/30/2021    CO2 21 01/30/2021    BUN 71 01/30/2021    CREATININE 5.7 01/30/2021    GFRAA 13 01/30/2021    GFRAA >60 05/17/2013    AGRATIO 1.2 01/30/2021    LABGLOM 10 01/30/2021    GLUCOSE 92 01/30/2021    PROT 6.5 01/30/2021    PROT 6.7 12/27/2012    LABALBU 3.6 01/30/2021    CALCIUM 8.6 01/30/2021    BILITOT <0.2 01/30/2021    ALKPHOS 84 01/30/2021    AST 10 01/30/2021    ALT 10 01/30/2021     PT/INR:    Lab Results   Component Value Date    PROTIME 11.6 12/07/2020    INR 1.00 12/07/2020     Troponin:    Lab Results   Component Value Date    TROPONINI 0.06 01/29/2021     HgBA1c:    Lab Results   Component Value Date    LABA1C 8.6 12/31/2020     CRP 1/29/2021: 26.0    ESR 1/29/2021: 78    Imaging: Three Views, Right Foot 1/29/2021:  No acute osseous abnormality. Procedure - Bedside Debridement of Right Hallux Ulcer:  I used a number fifteen blade to sharply debride the patient's ulceration of necrotic tissue down to the level of subcutaneous tissue. The debridement was excisional in nature. The ulceration measured 0.3cm x 0.6cm x 0.2cm. I then applied betadine to the site    Impression/Recommendations:     The patient is a pleasant 44year old gentleman with:  1) Ulcer to fat, right lower extremity with cellulitis  - Bedside debridement as described above at the right hallux  - Continue antibiotics per the Primary Team (Vanc and Cefepime)  - Betadine to wound sites  - I'm a little more worried about his vascular status than infection at this point. Will watch for vascular study results  - Will continue to follow  2) DM2 with PVD  - Arterial Duplex ordered  3) ESRD  4) Tobacco abuse    Thank you for the opportunity to take part in this patient's care. Please feel free to contact me with any questions.      [address], [address], [address]  Office: [phone]  Cell: [phone]

## 2021-01-31 LAB
ANION GAP SERPL CALCULATED.3IONS-SCNC: 14 MMOL/L (ref 3–16)
BUN BLDV-MCNC: 37 MG/DL (ref 7–20)
CALCIUM SERPL-MCNC: 8.6 MG/DL (ref 8.3–10.6)
CHLORIDE BLD-SCNC: 99 MMOL/L (ref 99–110)
CO2: 24 MMOL/L (ref 21–32)
CREAT SERPL-MCNC: 4.5 MG/DL (ref 0.9–1.3)
GFR AFRICAN AMERICAN: 17
GFR NON-AFRICAN AMERICAN: 14
GLUCOSE BLD-MCNC: 101 MG/DL (ref 70–99)
GLUCOSE BLD-MCNC: 119 MG/DL (ref 70–99)
GLUCOSE BLD-MCNC: 156 MG/DL (ref 70–99)
GLUCOSE BLD-MCNC: 169 MG/DL (ref 70–99)
GLUCOSE BLD-MCNC: 58 MG/DL (ref 70–99)
GLUCOSE BLD-MCNC: 60 MG/DL (ref 70–99)
HCT VFR BLD CALC: 30.2 % (ref 40.5–52.5)
HEMOGLOBIN: 10.2 G/DL (ref 13.5–17.5)
MCH RBC QN AUTO: 29.5 PG (ref 26–34)
MCHC RBC AUTO-ENTMCNC: 33.7 G/DL (ref 31–36)
MCV RBC AUTO: 87.6 FL (ref 80–100)
PDW BLD-RTO: 14.8 % (ref 12.4–15.4)
PERFORMED ON: ABNORMAL
PLATELET # BLD: 254 K/UL (ref 135–450)
PMV BLD AUTO: 8.1 FL (ref 5–10.5)
POTASSIUM SERPL-SCNC: 3.5 MMOL/L (ref 3.5–5.1)
RBC # BLD: 3.45 M/UL (ref 4.2–5.9)
SODIUM BLD-SCNC: 137 MMOL/L (ref 136–145)
WBC # BLD: 9.3 K/UL (ref 4–11)

## 2021-01-31 PROCEDURE — 2580000003 HC RX 258: Performed by: INTERNAL MEDICINE

## 2021-01-31 PROCEDURE — 80048 BASIC METABOLIC PNL TOTAL CA: CPT

## 2021-01-31 PROCEDURE — 85027 COMPLETE CBC AUTOMATED: CPT

## 2021-01-31 PROCEDURE — 6370000000 HC RX 637 (ALT 250 FOR IP): Performed by: INTERNAL MEDICINE

## 2021-01-31 PROCEDURE — 36415 COLL VENOUS BLD VENIPUNCTURE: CPT

## 2021-01-31 PROCEDURE — 6360000002 HC RX W HCPCS: Performed by: INTERNAL MEDICINE

## 2021-01-31 PROCEDURE — 2500000003 HC RX 250 WO HCPCS: Performed by: INTERNAL MEDICINE

## 2021-01-31 PROCEDURE — 1200000000 HC SEMI PRIVATE

## 2021-01-31 RX ORDER — SIMETHICONE 80 MG
80 TABLET,CHEWABLE ORAL EVERY 6 HOURS PRN
Status: DISCONTINUED | OUTPATIENT
Start: 2021-01-31 | End: 2021-02-03 | Stop reason: HOSPADM

## 2021-01-31 RX ADMIN — OXYCODONE HYDROCHLORIDE AND ACETAMINOPHEN 2 TABLET: 7.5; 325 TABLET ORAL at 14:49

## 2021-01-31 RX ADMIN — HYDRALAZINE HYDROCHLORIDE 50 MG: 50 TABLET, FILM COATED ORAL at 06:14

## 2021-01-31 RX ADMIN — ISOSORBIDE MONONITRATE 30 MG: 30 TABLET, EXTENDED RELEASE ORAL at 08:42

## 2021-01-31 RX ADMIN — ONDANSETRON 4 MG: 2 INJECTION INTRAMUSCULAR; INTRAVENOUS at 10:47

## 2021-01-31 RX ADMIN — CLOPIDOGREL BISULFATE 75 MG: 75 TABLET ORAL at 08:42

## 2021-01-31 RX ADMIN — Medication 10 ML: at 10:48

## 2021-01-31 RX ADMIN — HEPARIN SODIUM 5000 UNITS: 5000 INJECTION INTRAVENOUS; SUBCUTANEOUS at 20:18

## 2021-01-31 RX ADMIN — PREGABALIN 25 MG: 25 CAPSULE ORAL at 14:48

## 2021-01-31 RX ADMIN — ASPIRIN 81 MG: 81 TABLET, CHEWABLE ORAL at 08:42

## 2021-01-31 RX ADMIN — QUETIAPINE FUMARATE 50 MG: 25 TABLET ORAL at 16:55

## 2021-01-31 RX ADMIN — CEFEPIME HYDROCHLORIDE 1000 MG: 1 INJECTION, POWDER, FOR SOLUTION INTRAMUSCULAR; INTRAVENOUS at 16:56

## 2021-01-31 RX ADMIN — OXYCODONE HYDROCHLORIDE AND ACETAMINOPHEN 2 TABLET: 7.5; 325 TABLET ORAL at 06:14

## 2021-01-31 RX ADMIN — OXYCODONE HYDROCHLORIDE AND ACETAMINOPHEN 2 TABLET: 7.5; 325 TABLET ORAL at 23:30

## 2021-01-31 RX ADMIN — INSULIN GLARGINE 70 UNITS: 100 INJECTION, SOLUTION SUBCUTANEOUS at 20:18

## 2021-01-31 RX ADMIN — LOSARTAN POTASSIUM 50 MG: 25 TABLET, FILM COATED ORAL at 08:42

## 2021-01-31 RX ADMIN — DULOXETINE HYDROCHLORIDE 30 MG: 30 CAPSULE, DELAYED RELEASE ORAL at 08:42

## 2021-01-31 RX ADMIN — CITALOPRAM HYDROBROMIDE 40 MG: 20 TABLET ORAL at 08:42

## 2021-01-31 RX ADMIN — PREGABALIN 25 MG: 25 CAPSULE ORAL at 08:42

## 2021-01-31 RX ADMIN — PANTOPRAZOLE SODIUM 40 MG: 40 TABLET, DELAYED RELEASE ORAL at 06:14

## 2021-01-31 RX ADMIN — TORSEMIDE 100 MG: 100 TABLET ORAL at 08:42

## 2021-01-31 RX ADMIN — PREGABALIN 25 MG: 25 CAPSULE ORAL at 20:18

## 2021-01-31 RX ADMIN — NEPHROCAP 1 MG: 1 CAP ORAL at 08:42

## 2021-01-31 RX ADMIN — PRAVASTATIN SODIUM 80 MG: 80 TABLET ORAL at 20:18

## 2021-01-31 RX ADMIN — CALCIUM ACETATE 667 MG: 667 CAPSULE ORAL at 06:14

## 2021-01-31 RX ADMIN — HEPARIN SODIUM 5000 UNITS: 5000 INJECTION INTRAVENOUS; SUBCUTANEOUS at 14:49

## 2021-01-31 RX ADMIN — ERGOCALCIFEROL 50000 UNITS: 1.25 CAPSULE ORAL at 16:55

## 2021-01-31 RX ADMIN — OXYCODONE HYDROCHLORIDE AND ACETAMINOPHEN 2 TABLET: 7.5; 325 TABLET ORAL at 10:47

## 2021-01-31 RX ADMIN — HEPARIN SODIUM 5000 UNITS: 5000 INJECTION INTRAVENOUS; SUBCUTANEOUS at 06:14

## 2021-01-31 RX ADMIN — Medication 10 ML: at 20:19

## 2021-01-31 RX ADMIN — HYDRALAZINE HYDROCHLORIDE 50 MG: 50 TABLET, FILM COATED ORAL at 20:18

## 2021-01-31 RX ADMIN — OXYCODONE HYDROCHLORIDE AND ACETAMINOPHEN 2 TABLET: 7.5; 325 TABLET ORAL at 18:44

## 2021-01-31 ASSESSMENT — PAIN DESCRIPTION - LOCATION
LOCATION: TOE (COMMENT WHICH ONE)
LOCATION: BACK;FOOT

## 2021-01-31 ASSESSMENT — PAIN DESCRIPTION - PAIN TYPE
TYPE: CHRONIC PAIN
TYPE: ACUTE PAIN
TYPE: ACUTE PAIN

## 2021-01-31 ASSESSMENT — PAIN SCALES - GENERAL
PAINLEVEL_OUTOF10: 9
PAINLEVEL_OUTOF10: 7
PAINLEVEL_OUTOF10: 9
PAINLEVEL_OUTOF10: 8

## 2021-01-31 NOTE — PROGRESS NOTES
yesterday. Physical exam:   Constitutional:  VITALS:  /65   Pulse 93   Temp 98.2 °F (36.8 °C)   Resp 20   Ht 5' 9\" (1.753 m)   Wt 257 lb 15 oz (117 kg)   SpO2 93%   BMI 38.09 kg/m²   Gen: alert, awake, nad  Skin: no rash, turgor wnl  Heent:  eomi, mmm  Neck: no bruits or jvd noted, thyroid normal  Cardiovascular:  S1, S2 without m/r/g; trace lower extremity edema; right foot wound  Respiratory: CTA B without w/r/r; respiratory effort normal  Abdomen:  +bs, soft, nt, nd, no hepatosplenomegaly  Neuro/Psy: AAoriented times 3 ; moves all 4 ext  Musculoskeletal:  Rom, muscular strength intact; digits, nails normal  [facility]    Data/  Recent Labs     01/29/21  1518 01/30/21  0747 01/31/21  0814   WBC 11.1* 7.9 9.3   HGB 10.6* 10.0* 10.2*   HCT 32.8* 30.5* 30.2*   MCV 88.9 88.5 87.6    272 254     Recent Labs     01/29/21  1518 01/30/21  0747 01/31/21  0814    138 137   K 4.1 3.7 3.5   CL 99 103 99   CO2 25 21 24   GLUCOSE 180* 92 58*   BUN 67* 71* 37*   CREATININE 5.4* 5.7* 4.5*   LABGLOM 11* 10* 14*   GFRAA 14* 13* 17*       Assessment  -ESRD on HD MWF  -Right foot cellulitis on abx    podiatry following    Vascular to see   -Anemia  -CKD-MBD    Plan  -HD per MWF schedule, next 2/1  -iv abx per primary team  -Serial renal panel  -daily wts and strict i/o  -renal dose medications   -avoid nephrotoxins    Thank you for the consultation. Please do not hesitate to call with questions.     Link Carbon  [facility]  Office: [phone]  Fax:    [phone]

## 2021-01-31 NOTE — PROGRESS NOTES
01/30/21 2327   NIV Type   Equipment Type V60   Mode CPAP   Mask Type Full face mask   Mask Size Medium   Settings/Measurements   CPAP/EPAP 8 cmH2O   Resp 16   FiO2  21 %   Vt Exhaled 522 mL   Minute Volume 8 Liters   Mask Leak (lpm) 2 lpm   Comfort Level Good   Using Accessory Muscles No   SpO2 96

## 2021-01-31 NOTE — PROGRESS NOTES
Hospitalist Progress Note      PCP: Martha Jj MD    Date of Admission: 1/29/2021    Chief Complaint:   Wound and redness at right lower extremity    Hospital Course:     44 y. o. male with PMH significant for ESRD, on hemodialysis 3 times/week, hypertension, diabetes mellitus type 2/insulin requiring, NICM,  Depression, PAD s/p stents, ZAINAB on CPAP.      He presented to [facility] with right foot wound and swelling.   About a week and a half prior to this admission patient noticed sores on his right foot and toes.  When he removed his sock, patch of skin fell off and noticed that it was bleeding. He did not seek medical attention at that time. The day of admission patient missed his dialysis appointment and went to the primary care physician. James Vang the primary care office, patient was directed to the emergency department for further evaluation. In the emergency department, patient was diagnosed with right lower extremity cellulitis.  X-ray of the right foot did not show osteomyelitis.  Patient was started on empiric antibiotics and admitted.       Subjective:   He is sitting up in bed, redness in right foot seems to be improving. He still does have pain in the foot, describes it as throbbing in nature.       Medications:  Reviewed    Infusion Medications    dextrose       Scheduled Medications    aspirin  81 mg Oral Daily    Calcium Acetate (Phos Binder)  1 capsule Oral Daily    citalopram  40 mg Oral Daily    clopidogrel  75 mg Oral Daily    DULoxetine  30 mg Oral Daily    fluticasone  2 spray Each Nostril BID    hydrALAZINE  50 mg Oral 3 times per day    insulin glargine  70 Units Subcutaneous Nightly    isosorbide mononitrate  30 mg Oral Daily    linaclotide  145 mcg Oral QAM AC    losartan  50 mg Oral Daily    pantoprazole  40 mg Oral QAM AC    pravastatin  80 mg Oral Nightly    pregabalin  25 mg Oral TID    QUEtiapine  50 mg Oral QPM    torsemide  100 mg Oral Daily    vitamin D 50,000 Units Oral Weekly    sodium chloride flush  10 mL Intravenous 2 times per day    nicotine  1 patch Transdermal Daily    heparin (porcine)  5,000 Units Subcutaneous 3 times per day    insulin lispro  0-12 Units Subcutaneous TID WC    insulin lispro  0-6 Units Subcutaneous Nightly    cefepime  1,000 mg Intravenous Q24H    vancomycin (VANCOCIN) intermittent dosing (placeholder)   Other RX Placeholder    b complex-C-folic acid  1 capsule Oral Daily     PRN Meds: oxyCODONE-acetaminophen, heparin (porcine), albuterol, nitroGLYCERIN, glucose, dextrose, glucagon (rDNA), dextrose, sodium chloride flush, promethazine **OR** ondansetron, polyethylene glycol, acetaminophen **OR** acetaminophen      Intake/Output Summary (Last 24 hours) at 1/31/2021 1633  Last data filed at 1/31/2021 0035  Gross per 24 hour   Intake 1220 ml   Output 750 ml   Net 470 ml       Physical Exam Performed:    /65   Pulse 93   Temp 98.2 °F (36.8 °C)   Resp 20   Ht 5' 9\" (1.753 m)   Wt 257 lb 15 oz (117 kg)   SpO2 93%   BMI 38.09 kg/m²     General appearance: He is lying in bed, he is in no acute distress, is alert and  cooperative. HEENT: Pupils equal, round, and reactive to light. Conjunctivae/corneas clear. Neck: Supple, with full range of motion. No jugular venous distention. Trachea midline. Respiratory:  Normal respiratory effort. Clear to auscultation, bilaterally without Rales/Wheezes/Rhonchi. Cardiovascular: Regular rate and rhythm with normal S1/S2   Abdomen: Soft, non-tender, non-distended with normal bowel sounds. Musculoskeletal: No clubbing, cyanosis or edema   Redness and tenderness on right lower extremity toes and dorsum of the foot this is improved from my exam of yesterday.   no visible drainage.    Noted a dry scabbed wound on the dorsal surface of distal ankle on the right lower extremity. Neurologic:  Neurovascularly intact without any focal sensory/motor deficits.  Cranial nerves: II-XII intact, grossly non-focal.  Psychiatric: Alert and oriented, thought content appropriate, normal insight  Capillary Refill: Brisk,< 3 seconds   Peripheral Pulses: +2 palpable, equal bilaterally       Labs:   Recent Labs     01/29/21  1518 01/30/21  0747 01/31/21  0814   WBC 11.1* 7.9 9.3   HGB 10.6* 10.0* 10.2*   HCT 32.8* 30.5* 30.2*    272 254     Recent Labs     01/29/21  1518 01/30/21  0747 01/31/21  0814    138 137   K 4.1 3.7 3.5   CL 99 103 99   CO2 25 21 24   BUN 67* 71* 37*   CREATININE 5.4* 5.7* 4.5*   CALCIUM 9.3 8.6 8.6     Recent Labs     01/30/21  0747   AST 10*   ALT 10   BILITOT <0.2   ALKPHOS 84     No results for input(s): INR in the last 72 hours. Recent Labs     01/29/21  1518 01/29/21  1919 01/29/21  2319   TROPONINI 0.07* 0.06* 0.06*       Urinalysis:      Lab Results   Component Value Date    NITRU Negative 06/05/2020    WBCUA 10-20 06/05/2020    BACTERIA 2+ 06/05/2020    RBCUA 0-2 06/05/2020    BLOODU TRACE-INTACT 06/05/2020    SPECGRAV 1.020 06/05/2020    GLUCOSEU 250 06/05/2020       Radiology:  XR FOOT RIGHT (MIN 3 VIEWS)   Final Result   No acute osseous abnormality. Assessment/Plan:    Active Hospital Problems    Diagnosis    Cellulitis of right foot [A36.260]     Right lower extremity cellulitis  Started about a week ago.  Got progressively worse. Physical and laboratory findings are not consistent with sepsis. Continue on empiric IV antibiotics. He is receiving cefepime and vancomycin and will follow closely. Foot is much less red and seems to be improving. Plain x-ray of the lower extremity is not showing any signs of underlying bone infection. podiatry is consulted and input is appreciated. He did undergo bedside debridement of right hallux ulcer on 1/30/2021 with podiatry.    will follow closely  Vascular surgery also now consulted for further input.     Diabetes mellitus type 2 with hyperglycemia  Continue basal bolus insulin protocol with Lantus and sliding scale insulin     End-stage renal disease on hemodialysis  Continue hemodialysis 3 times weekly, nephrology also consulted and following       Elevated troponin  Consistent with poor clearance associated with end-stage renal disease.  In line with troponin levels obtained during past year. Clinically stable, no symptoms.     Tobacco abuse  Unfortunately, patient is still an active smoker and is not interested in quitting smoking.   Nicotine patch will be provided as inpatient.     Peripheral diabetic neuropathy  Continue Lyrica at home dose     Dyslipidemia  Continue statin    DVT Prophylaxis: Heparin  Diet: DIET CARB CONTROL; Carb Control: 5 carb choices (75 gms)/meal  Code Status: Full Code    PT/OT Eval Status: Not ordered yet    Dispo -1 to 2 days    Deejay Gallardo MD

## 2021-02-01 LAB
ANION GAP SERPL CALCULATED.3IONS-SCNC: 13 MMOL/L (ref 3–16)
BUN BLDV-MCNC: 47 MG/DL (ref 7–20)
CALCIUM SERPL-MCNC: 8.4 MG/DL (ref 8.3–10.6)
CHLORIDE BLD-SCNC: 95 MMOL/L (ref 99–110)
CO2: 24 MMOL/L (ref 21–32)
CREAT SERPL-MCNC: 5.4 MG/DL (ref 0.9–1.3)
GFR AFRICAN AMERICAN: 14
GFR NON-AFRICAN AMERICAN: 11
GLUCOSE BLD-MCNC: 105 MG/DL (ref 70–99)
GLUCOSE BLD-MCNC: 150 MG/DL (ref 70–99)
GLUCOSE BLD-MCNC: 189 MG/DL (ref 70–99)
GLUCOSE BLD-MCNC: 85 MG/DL (ref 70–99)
GLUCOSE BLD-MCNC: 91 MG/DL (ref 70–99)
HCT VFR BLD CALC: 29.6 % (ref 40.5–52.5)
HEMOGLOBIN: 9.8 G/DL (ref 13.5–17.5)
MCH RBC QN AUTO: 29.2 PG (ref 26–34)
MCHC RBC AUTO-ENTMCNC: 33.1 G/DL (ref 31–36)
MCV RBC AUTO: 88.3 FL (ref 80–100)
PDW BLD-RTO: 14.4 % (ref 12.4–15.4)
PERFORMED ON: ABNORMAL
PERFORMED ON: NORMAL
PLATELET # BLD: 238 K/UL (ref 135–450)
PMV BLD AUTO: 7.8 FL (ref 5–10.5)
POTASSIUM SERPL-SCNC: 4.1 MMOL/L (ref 3.5–5.1)
RBC # BLD: 3.35 M/UL (ref 4.2–5.9)
SODIUM BLD-SCNC: 132 MMOL/L (ref 136–145)
VANCOMYCIN RANDOM: 9.9 UG/ML
WBC # BLD: 8.4 K/UL (ref 4–11)

## 2021-02-01 PROCEDURE — 6360000002 HC RX W HCPCS: Performed by: INTERNAL MEDICINE

## 2021-02-01 PROCEDURE — 2580000003 HC RX 258: Performed by: INTERNAL MEDICINE

## 2021-02-01 PROCEDURE — 36415 COLL VENOUS BLD VENIPUNCTURE: CPT

## 2021-02-01 PROCEDURE — 80048 BASIC METABOLIC PNL TOTAL CA: CPT

## 2021-02-01 PROCEDURE — 94761 N-INVAS EAR/PLS OXIMETRY MLT: CPT

## 2021-02-01 PROCEDURE — 2500000003 HC RX 250 WO HCPCS: Performed by: INTERNAL MEDICINE

## 2021-02-01 PROCEDURE — 6370000000 HC RX 637 (ALT 250 FOR IP): Performed by: INTERNAL MEDICINE

## 2021-02-01 PROCEDURE — 2700000000 HC OXYGEN THERAPY PER DAY

## 2021-02-01 PROCEDURE — 6370000000 HC RX 637 (ALT 250 FOR IP): Performed by: NURSE PRACTITIONER

## 2021-02-01 PROCEDURE — 90935 HEMODIALYSIS ONE EVALUATION: CPT

## 2021-02-01 PROCEDURE — 99222 1ST HOSP IP/OBS MODERATE 55: CPT | Performed by: SURGERY

## 2021-02-01 PROCEDURE — 94660 CPAP INITIATION&MGMT: CPT

## 2021-02-01 PROCEDURE — 80202 ASSAY OF VANCOMYCIN: CPT

## 2021-02-01 PROCEDURE — 85027 COMPLETE CBC AUTOMATED: CPT

## 2021-02-01 PROCEDURE — 1200000000 HC SEMI PRIVATE

## 2021-02-01 RX ORDER — MORPHINE SULFATE 2 MG/ML
2 INJECTION, SOLUTION INTRAMUSCULAR; INTRAVENOUS EVERY 6 HOURS PRN
Status: DISCONTINUED | OUTPATIENT
Start: 2021-02-01 | End: 2021-02-02

## 2021-02-01 RX ADMIN — PREGABALIN 25 MG: 25 CAPSULE ORAL at 13:09

## 2021-02-01 RX ADMIN — ASPIRIN 81 MG: 81 TABLET, CHEWABLE ORAL at 13:06

## 2021-02-01 RX ADMIN — HEPARIN SODIUM 5000 UNITS: 5000 INJECTION INTRAVENOUS; SUBCUTANEOUS at 04:09

## 2021-02-01 RX ADMIN — HYDRALAZINE HYDROCHLORIDE 50 MG: 50 TABLET, FILM COATED ORAL at 23:07

## 2021-02-01 RX ADMIN — LOSARTAN POTASSIUM 50 MG: 25 TABLET, FILM COATED ORAL at 13:05

## 2021-02-01 RX ADMIN — VANCOMYCIN HYDROCHLORIDE 1000 MG: 1 INJECTION, POWDER, LYOPHILIZED, FOR SOLUTION INTRAVENOUS at 13:10

## 2021-02-01 RX ADMIN — CITALOPRAM HYDROBROMIDE 40 MG: 20 TABLET ORAL at 13:07

## 2021-02-01 RX ADMIN — Medication 10 ML: at 13:10

## 2021-02-01 RX ADMIN — HYDRALAZINE HYDROCHLORIDE 50 MG: 50 TABLET, FILM COATED ORAL at 13:08

## 2021-02-01 RX ADMIN — HEPARIN SODIUM 5000 UNITS: 5000 INJECTION INTRAVENOUS; SUBCUTANEOUS at 13:15

## 2021-02-01 RX ADMIN — TORSEMIDE 100 MG: 100 TABLET ORAL at 13:06

## 2021-02-01 RX ADMIN — QUETIAPINE FUMARATE 50 MG: 25 TABLET ORAL at 18:42

## 2021-02-01 RX ADMIN — CALCIUM ACETATE 667 MG: 667 CAPSULE ORAL at 13:06

## 2021-02-01 RX ADMIN — NEPHROCAP 1 MG: 1 CAP ORAL at 13:06

## 2021-02-01 RX ADMIN — INSULIN GLARGINE 70 UNITS: 100 INJECTION, SOLUTION SUBCUTANEOUS at 23:08

## 2021-02-01 RX ADMIN — OXYCODONE HYDROCHLORIDE AND ACETAMINOPHEN 2 TABLET: 7.5; 325 TABLET ORAL at 04:10

## 2021-02-01 RX ADMIN — Medication 10 ML: at 23:28

## 2021-02-01 RX ADMIN — SIMETHICONE 80 MG: 80 TABLET, CHEWABLE ORAL at 00:11

## 2021-02-01 RX ADMIN — CLOPIDOGREL BISULFATE 75 MG: 75 TABLET ORAL at 13:08

## 2021-02-01 RX ADMIN — PREGABALIN 25 MG: 25 CAPSULE ORAL at 23:07

## 2021-02-01 RX ADMIN — HYDRALAZINE HYDROCHLORIDE 50 MG: 50 TABLET, FILM COATED ORAL at 04:10

## 2021-02-01 RX ADMIN — DULOXETINE HYDROCHLORIDE 30 MG: 30 CAPSULE, DELAYED RELEASE ORAL at 13:07

## 2021-02-01 RX ADMIN — OXYCODONE HYDROCHLORIDE AND ACETAMINOPHEN 2 TABLET: 7.5; 325 TABLET ORAL at 13:15

## 2021-02-01 RX ADMIN — OXYCODONE HYDROCHLORIDE AND ACETAMINOPHEN 2 TABLET: 7.5; 325 TABLET ORAL at 23:07

## 2021-02-01 RX ADMIN — ISOSORBIDE MONONITRATE 30 MG: 30 TABLET, EXTENDED RELEASE ORAL at 13:06

## 2021-02-01 RX ADMIN — OXYCODONE HYDROCHLORIDE AND ACETAMINOPHEN 2 TABLET: 7.5; 325 TABLET ORAL at 18:42

## 2021-02-01 RX ADMIN — INSULIN LISPRO 2 UNITS: 100 INJECTION, SOLUTION INTRAVENOUS; SUBCUTANEOUS at 18:41

## 2021-02-01 RX ADMIN — PRAVASTATIN SODIUM 80 MG: 80 TABLET ORAL at 23:07

## 2021-02-01 RX ADMIN — CEFEPIME HYDROCHLORIDE 1000 MG: 1 INJECTION, POWDER, FOR SOLUTION INTRAMUSCULAR; INTRAVENOUS at 18:42

## 2021-02-01 RX ADMIN — HEPARIN SODIUM 5000 UNITS: 5000 INJECTION INTRAVENOUS; SUBCUTANEOUS at 23:07

## 2021-02-01 ASSESSMENT — PAIN SCALES - GENERAL
PAINLEVEL_OUTOF10: 7
PAINLEVEL_OUTOF10: 8
PAINLEVEL_OUTOF10: 6

## 2021-02-01 NOTE — PROGRESS NOTES
Hospitalist Progress Note      PCP: Rosita Thakkar MD    Date of Admission: 1/29/2021    Chief Complaint:   Wound and redness at right lower extremity     Hospital Course:      44 y. o. male with hx of ESRD(on HD 3x/week, hypertension, diabetes mellitus type 2/insulin requiring, NICM,  Depression, PAD s/p stents, ZAINAB on CPAP. p/w with right foot wound and swelling.   About a week and a half prior to this admission patient noticed sores on his right foot and toes.  When he removed his sock, patch of skin fell off and noticed that it was bleeding.  He did not seek medical attention at that time. The day of admission patient missed his dialysis appointment and went to the primary care physician. Gautam Ramírez the primary care office, patient was directed to the emergency department for further evaluation.   In the emergency department, patient was diagnosed with right lower extremity cellulitis.  X-ray of the right foot did not show osteomyelitis.  Patient was started on empiric antibiotics and admitted.        Subjective:   He is on HD currently, notes ongoing throbbing pain on right great toe      Medications:  Reviewed    Infusion Medications    dextrose       Scheduled Medications    vancomycin  1,000 mg Intravenous Once    aspirin  81 mg Oral Daily    Calcium Acetate (Phos Binder)  1 capsule Oral Daily    citalopram  40 mg Oral Daily    clopidogrel  75 mg Oral Daily    DULoxetine  30 mg Oral Daily    fluticasone  2 spray Each Nostril BID    hydrALAZINE  50 mg Oral 3 times per day    insulin glargine  70 Units Subcutaneous Nightly    isosorbide mononitrate  30 mg Oral Daily    linaclotide  145 mcg Oral QAM AC    losartan  50 mg Oral Daily    pantoprazole  40 mg Oral QAM AC    pravastatin  80 mg Oral Nightly    pregabalin  25 mg Oral TID    QUEtiapine  50 mg Oral QPM    torsemide  100 mg Oral Daily    vitamin D  50,000 Units Oral Weekly    sodium chloride flush  10 mL Intravenous 2 times per day    nicotine  1 patch Transdermal Daily    heparin (porcine)  5,000 Units Subcutaneous 3 times per day    insulin lispro  0-12 Units Subcutaneous TID WC    insulin lispro  0-6 Units Subcutaneous Nightly    cefepime  1,000 mg Intravenous Q24H    vancomycin (VANCOCIN) intermittent dosing (placeholder)   Other RX Placeholder    b complex-C-folic acid  1 capsule Oral Daily     PRN Meds: simethicone, oxyCODONE-acetaminophen, heparin (porcine), albuterol, nitroGLYCERIN, glucose, dextrose, glucagon (rDNA), dextrose, sodium chloride flush, promethazine **OR** ondansetron, polyethylene glycol, acetaminophen **OR** acetaminophen      Intake/Output Summary (Last 24 hours) at 2/1/2021 1037  Last data filed at 1/31/2021 2301  Gross per 24 hour   Intake 1200 ml   Output 350 ml   Net 850 ml       Physical Exam Performed:    BP (!) 141/81   Pulse 82   Temp 97.8 °F (36.6 °C) (Oral)   Resp 16   Ht 5' 9\" (1.753 m)   Wt 257 lb 15 oz (117 kg)   SpO2 95%   BMI 38.09 kg/m²     General appearance: No apparent distress, appears stated age and cooperative. HEENT: Pupils equal, round, and reactive to light. Conjunctivae/corneas clear. Neck: Supple, with full range of motion. No jugular venous distention. Trachea midline. Respiratory:  Normal respiratory effort. Clear to auscultation, bilaterally without Rales/Wheezes/Rhonchi. Cardiovascular: Regular rate and rhythm with normal S1/S2 without murmurs, rubs or gallops. Abdomen: Soft, non-tender, non-distended with normal bowel sounds. Musculoskeletal: No clubbing, cyanosis or edema bilaterally. Full range of motion without deformity  Redness and tenderness on right lower extremity toes and dorsum of the foot this is improved from my exam of yesterday.   no visible drainage.    Noted a dry scabbed wound on the dorsal surface of distal ankle on the right lower extremity. .  Skin: Skin color, texture, turgor normal.  No rashes or lesions.   Neurologic:  Neurovascularly intact without any focal sensory/motor deficits. Cranial nerves: II-XII intact, grossly non-focal.  Psychiatric: Alert and oriented, thought content appropriate, normal insight  Capillary Refill: Brisk,< 3 seconds   Peripheral Pulses: +2 palpable, equal bilaterally       Labs:   Recent Labs     01/30/21  0747 01/31/21  0814 02/01/21  0717   WBC 7.9 9.3 8.4   HGB 10.0* 10.2* 9.8*   HCT 30.5* 30.2* 29.6*    254 238     Recent Labs     01/30/21  0747 01/31/21  0814 02/01/21  0717    137 132*   K 3.7 3.5 4.1    99 95*   CO2 21 24 24   BUN 71* 37* 47*   CREATININE 5.7* 4.5* 5.4*   CALCIUM 8.6 8.6 8.4     Recent Labs     01/30/21  0747   AST 10*   ALT 10   BILITOT <0.2   ALKPHOS 84     No results for input(s): INR in the last 72 hours. Recent Labs     01/29/21  1518 01/29/21  1919 01/29/21  2319   TROPONINI 0.07* 0.06* 0.06*       Urinalysis:      Lab Results   Component Value Date    NITRU Negative 06/05/2020    WBCUA 10-20 06/05/2020    BACTERIA 2+ 06/05/2020    RBCUA 0-2 06/05/2020    BLOODU TRACE-INTACT 06/05/2020    SPECGRAV 1.020 06/05/2020    GLUCOSEU 250 06/05/2020       Radiology:  XR FOOT RIGHT (MIN 3 VIEWS)   Final Result   No acute osseous abnormality. Assessment/Plan:    Active Hospital Problems    Diagnosis    Cellulitis of right foot [J73.219]         Right lower extremity cellulitis  Started about a week ago.  Got progressively worse. Physical and laboratory findings are not consistent with sepsis.   Continued on empiric IV antibiotics. He is receiving cefepime and vancomycin and will follow closely. Foot is much less red and seems to be improving. Plain x-ray of the lower extremity is not showing any signs of underlying bone infection.   podiatry is consulted and input is appreciated.   He did undergo bedside debridement of right hallux ulcer on 1/30/2021 with podiatry.  Ann Dowd follow closely  Vascular surgery consulted for further input.   -angiogram planned 2/2    Diabetes mellitus type 2 with hyperglycemia  Continued basal bolus insulin protocol with Lantus and sliding scale insulin     End-stage renal disease on hemodialysis  Continue hemodialysis 3 times weekly, nephrology also consulted and following        Elevated troponin  Consistent with poor clearance associated with end-stage renal disease.  In line with troponin levels obtained during past year. Clinically stable, no symptoms.     Tobacco abuse  Unfortunately, patient is still an active smoker and is not interested in quitting smoking.   Nicotine patch will be provided as inpatient.     Peripheral diabetic neuropathy  Continue Lyrica at home dose     Dyslipidemia  Continue statin     DVT Prophylaxis: Heparin  Diet: DIET CARB CONTROL; Carb Control: 5 carb choices (75 gms)/meal  Diet NPO, After Midnight  Code Status: Full Code    PT/OT Eval Status: not ordered yet     Dispo - pending angiogram tomorrow, added prn 6hr low dose morphine    Latesha Ch MD

## 2021-02-01 NOTE — PROGRESS NOTES
02/01/21 0149   NIV Type   $NIV $Daily Charge   Equipment Type V60   Mode CPAP   Mask Type Full face mask   Mask Size Medium   Settings/Measurements   CPAP/EPAP 8 cmH2O   Resp 16   FiO2  21 %   Vt Exhaled 715 mL   Mask Leak (lpm) 2 lpm   Comfort Level Good   Using Accessory Muscles No   SpO2 97   Breath Sounds   Right Upper Lobe Diminished   Right Middle Lobe Diminished   Right Lower Lobe Diminished   Left Upper Lobe Diminished   Left Lower Lobe Diminished   Alarm Settings   Alarms On Y

## 2021-02-01 NOTE — DISCHARGE INSTR - COC
Continuity of Care Form    Patient Name: Claudia Lee   :  1970  MRN:  [id number]    Admit date:  2021  Discharge date:  ***    Code Status Order: Full Code   Advance Directives:      Admitting Physician:  Karen Valle MD  PCP: Nathan Henderson MD    Discharging Nurse: [facility] Unit/Room#: 7683/0846-19  Discharging Unit Phone Number: ***    Emergency Contact:   Extended Emergency Contact Information  Primary Emergency Contact: Crystal Ann  Address: [address] [place] [address] [address] [facility], [address] [facility] [place] [address] Phone: [phone]  Mobile Phone: [phone]  Relation: Spouse    Past Surgical History:  Past Surgical History:   Procedure Laterality Date    AORTIC VALVE REPLACEMENT N/A 10/15/2019    TRANSCATHETER AORTIC VALVE REPLACEMENT FEMORAL APPROACH performed by Marie Brownlee MD at [facility] Right 2019    PERITONEAL DIALYSIS CATHETER REMOVAL performed by Michael Martinez MD at [address] [place] [address]      WN    CORONARY ANGIOPLASTY WITH STENT PLACEMENT  05/26/15    CYST REMOVAL  2013    EXCISION CYSTS, NECK X2 AND ABDOMINAL benign    DIAGNOSTIC CARDIAC CATH LAB PROCEDURE      DIALYSIS FISTULA CREATION Left 10/30/2017    LEFT BRACHIAL CEPHALIC FISTULA    DIALYSIS FISTULA CREATION Left 3/27/2019    LIGATION  AV FISTULA performed by Santiago Hinkle MD at [address] [place] [address], [facility]      OTHER SURGICAL HISTORY  2017    laparoscopic cholecystectomy with intraoperative cholangiogram    OTHER SURGICAL HISTORY  2018    PORT PLACEMENT  - vas cath    OTHER SURGICAL HISTORY Bilateral 2018    laprascopic peritoneal dialysis catheter placement    OTHER SURGICAL HISTORY Right 2018    peritoneal dialysis port placed on right side of abdomen    OTHER SURGICAL HISTORY  2019    PTA/Stenting R External Iliac artery    NV LAP INSERTION TUNNELED INTRAPERITONEAL CATHETER N/A 9/21/2018    LAPAROSCOPIC PERITONEAL DIALYSIS CATHETER REPLACEMENT performed by Anthony Germain MD at [facility] ENDOSCOPY  01/06/2016    UPPER GASTROINTESTINAL ENDOSCOPY  01/29/2017    possible candida, otherwise normal appearing    VASCULAR SURGERY  aprx 2 years ago    2 stents placed, each side of groin       Immunization History:   Immunization History   Administered Date(s) Administered    Hepatitis B Adult (Engerix-B) 11/18/2017, 06/13/2018, 07/13/2018    Influenza Virus Vaccine 12/27/2012, 10/07/2014, 11/20/2015, 10/16/2019    Influenza, Intradermal, Quadrivalent, Preservative Free 11/16/2016    Influenza, MDCK Quadv, IM, PF (Flucelvax 4 yrs and older) 10/14/2017, 09/30/2020    Influenza, [place], 6 mo and older, IM, PF (Flulaval, Fluarix) 09/15/2018    Influenza, Quadv, IM, PF (6 mo and older Fluzone, Flulaval, Fluarix, and 3 yrs and older Afluria) 10/16/2019    PPD Test 11/09/2017, 11/16/2017, 01/03/2019, 01/06/2020    Pneumococcal Conjugate 13-valent (Frinbrw97) 10/14/2017    Pneumococcal Polysaccharide (Klvlhzkjw89) 10/07/2014, 11/19/2019    Tdap (Boostrix, Adacel) 02/13/2020    Zoster Recombinant (Shingrix) 02/13/2020       Active Problems:  Patient Active Problem List   Diagnosis Code    Acute respiratory failure with hypoxia and hypercapnia ([facility]) J96.01, J96.02    Chronic dCHF (grade 2 LVDD) I50.9    Chronic obstructive pulmonary disease ([facility]) J44.9    CAD (coronary artery disease) I25.10    PVD (peripheral vascular disease) ([facility]) I73.9    Nonischemic cardiomyopathy ([facility] [address] I42.8    Sleep apnea G47.30    Bicuspid aortic valve Q23.1    Bilateral hilar adenopathy syndrome R59.0    Claudication in peripheral vascular disease ([facility]) I73.9    Essential hypertension I10    Diabetic neuropathy ([facility]) E11.40    Type 2 diabetes, uncontrolled, with neuropathy ([facility]) E11.40, E11.65    Passive smoke exposure on dialysis ([facility] [address] [facility] N18.6, Z99.2    Hypotension due to drugs I95.2    Acute diastolic CHF (congestive heart failure) ([facility]) I50.31    Neuromuscular disorder ([facility]) G70.9    Acute combined systolic and diastolic CHF, NYHA class 4 ([facility]) I50.41    Renovascular hypertension I15.0    Mixed hyperlipidemia E78.2    Cigarette nicotine dependence in remission F17.211    Acute respiratory failure ([facility]) J96.00    Pulmonary edema J81.1    Fluid overload S74.67    Diastolic dysfunction A45.83    Anemia of chronic disease D63.8    SOB (shortness of breath) R06.02    Steal syndrome of dialysis vascular access ([facility]) T82.898A    Chronic, continuous use of opioids F11.90    Chronic bronchitis ([facility]) J42    Nasal congestion R09.81    Hypercholesteremia E78.00    Bradycardia R00.1    S/P TAVR (transcatheter aortic valve replacement) Z95.2    Syncope and collapse R55    Atrial fibrillation ([facility]) I48.91    Former smoker Z87.891    Generalized weakness R53.1    DKA, type 1, not at goal [facility]) E10.10    Bilateral leg weakness R29.898    GBS (Guillain-[place] syndrome) ([facility]) G61.0    Sinus pause I45.5    Weakness of both lower extremities R29.898    Sinus bradycardia R00.1    Ataxia R27.0    Peripheral vascular occlusive disease ([facility]) I73.9    Cellulitis of right foot L03.115       Isolation/Infection:   Isolation            No Isolation          Patient Infection Status       Infection Onset Added Last Indicated Last Indicated By Review Planned Expiration Resolved Resolved By    None active    Resolved    COVID-19 Rule Out 12/18/20 12/18/20 12/18/20 COVID-19 (Ordered)   12/18/20 Rule-Out Test Resulted    COVID-19 Rule Out 05/04/20 05/04/20 05/04/20 COVID-19 (Ordered)   05/04/20 Rule-Out Test Resulted            Nurse Assessment:  Last Vital Signs: /74   Pulse 94   Temp 98.7 °F (37.1 °C) (Oral)   Resp 16   Ht 5' 9\" (1.753 m)   Wt 257 lb 0.9 oz (116.6 kg)   SpO2 95%   BMI 37.96 kg/m²     Last documented pain score (0-10 scale): Pain Level: 8  Last Weight:   Wt Readings from Last 1 Encounters:   02/01/21 257 lb 0.9 oz (116.6 kg)     Mental Status:  {IP PT MENTAL STATUS:44469}    IV Access:  { CELINE IV ACCESS:175251018}    Nursing Mobility/ADLs:  Walking   {P DME OJXV:977995808}  Transfer  {P DME JHZM:639805892}  Bathing  {CHP DME KKMF:002975924}  Dressing  {CHP DME NATW:271941562}  Toileting  {CHP DME CFWE:100899229}  Feeding  {P DME CPKT:253050807}  Med Admin  {P DME QLKW:635879273}  Med Delivery   { CELINE MED Delivery:976238226}    Wound Care Documentation and Therapy:  Wound 12/31/20 Foot Left (Active)   Wound Image   12/31/20 1720   Wound Etiology Traumatic 01/02/21 0742   Dressing Status Dry; Intact 12/31/20 1720   Dressing/Treatment Betadine swabs/povidone iodine 01/05/21 0815   Dressing Change Due 01/04/21 01/04/21 0800   Wound Length (cm) 0.7 cm 12/31/20 1720   Wound Width (cm) 1.7 cm 12/31/20 1720   Wound Depth (cm) 0 cm 12/31/20 1720   Wound Surface Area (cm^2) 1.19 cm^2 12/31/20 1720   Wound Volume (cm^3) 0 cm^3 12/31/20 1720   Wound Assessment Eschar dry 01/05/21 0815   Drainage Amount None 01/05/21 0815   Odor None 12/31/20 1720   Liberty-wound Assessment Dry/flaky 12/31/20 1720   Margins Attached edges; Defined edges 12/31/20 1720   Number of days: 31       Wound 12/31/20 Foot Right (Active)   Wound Image   12/31/20 1720   Wound Etiology Traumatic 01/02/21 0742   Dressing Status Dry; Intact 12/31/20 1720   Dressing/Treatment Betadine swabs/povidone iodine 01/05/21 0815   Dressing Change Due 01/04/21 01/04/21 0800   Wound Length (cm) 1 cm 12/31/20 1720   Wound Width (cm) 3 cm 12/31/20 1720   Wound Depth (cm) 0 cm 12/31/20 1720   Wound Surface Area (cm^2) 3 cm^2 12/31/20 1720   Wound Volume (cm^3) 0 cm^3 12/31/20 1720   Wound Assessment Eschar dry 01/05/21 0815   Drainage Amount None 01/05/21 0815   Odor None 12/31/20 1720   Liberty-wound Assessment Dry/flaky; Blanchable erythema 12/31/20 1720   Margins Attached edges; Defined edges 20 1720   Number of days: 31        Elimination:  Continence:   · Bowel: {YES / KX:15876}  · Bladder: {YES / RQ:30101}  Urinary Catheter: {Urinary Catheter:858982808}   Colostomy/Ileostomy/Ileal Conduit: {YES / HT:62494}       Date of Last BM: ***    Intake/Output Summary (Last 24 hours) at 2021 1501  Last data filed at 2021 1436  Gross per 24 hour   Intake 1440 ml   Output 4350 ml   Net -2910 ml     I/O last 3 completed shifts:   In: 1440 [P.O.:1440]  Out: 4350 [Urine:350]    Safety Concerns:     [address] Safety Concerns:117272451}    Impairments/Disabilities:      [address] Impairments/Disabilities:936117885}    Nutrition Therapy:  Current Nutrition Therapy:   [address] Diet List:433578585}    Routes of Feeding: {CHP DME Other Feedings:608269124}  Liquids: {Slp liquid thickness:04467}  Daily Fluid Restriction: {CHP DME Yes amt example:912277298}  Last Modified Barium Swallow with Video (Video Swallowing Test): {Done Not Done ZDPA:528776770}    Treatments at the Time of Hospital Discharge:   Respiratory Treatments: ***  Oxygen Therapy:  {Therapy; copd oxygen:08156}  Ventilator:    {MH CC Vent TLHJ:376785001}    Rehab Therapies: Physical Therapy, Occupational Therapy and Nurse  Weight Bearing Status/Restrictions: [address]  Weight Bearin}  Other Medical Equipment (for information only, NOT a DME order):  {EQUIPMENT:620194116}  Other Treatments: HOME HEALTH CARE: LEVEL [address] [place] [address], [address] agency to establish plan of care for patient over 60 day period   Nursing  Initial home SN evaluation visit to occur within 24-48 hours for:  1)  medication management  2)  VS and clinical assessment  3)  S&S chronic disease exacerbation education + when to contact MD/NP  4)  care coordination  Medication Reconciliation during 1st SN visit  PT/OT/Speech   Evaluations in home within 24-48 hours of discharge to include DME and home safety   Frontload therapy 5 days, then 3x a week   OT to evaluate if patient has [address] needs for personal care    evaluation within 24-48 hours to evaluate resources & insurance for potential [place], [place], LTC, and Medicaid options   Palliative Care referral within 5 days of hospital discharge   PCP Visit scheduled within 3 - 7 days of hospital discharge [address] (If patient is agreeable and meets guidelines)      Patient's personal belongings (please select all that are sent with patient):  {CHP DME Belongings:146584936}    RN SIGNATURE:  {Esignature:493393731}    CASE MANAGEMENT/SOCIAL WORK SECTION    Inpatient Status Date: 1/29/21    Readmission Risk Assessment Score:  Readmission Risk              Risk of Unplanned Readmission:        60           Discharging to Facility/ Agency   Name:  [facility]    Address: [address] [place] [address], [address] [place] [address], [place] [address]  Phone: [phone]  Fax: [phone]      Dialysis Facility (if applicable)   · Name: [facility]        Address:  · Dialysis Schedule: MWF @ 1115  · Phone:  · Fax:    / signature: {Esignature:190772244}    PHYSICIAN SECTION    Prognosis: Fair    Condition at Discharge: Stable    Rehab Potential (if transferring to Rehab): Fair    Recommended Labs or Other Treatments After Discharge: Podiatry as outpatient as instructed    Physician Certification: I certify the above information and transfer of Zachariah Muñoz  is necessary for the continuing treatment of the diagnosis listed and that he requires Home Care for less 30 days.      Update Admission H&P: No change in H&P    PHYSICIAN SIGNATURE:  Electronically signed by Iván Cox MD on 2/3/21 at 11:28 AM EST

## 2021-02-01 NOTE — FLOWSHEET NOTE
02/01/21 0825 02/01/21 1220   Treatment   Time On 0840  --    Time Off  --  1219   Treatment Goal 3500  --    Vital Signs   BP (!) 160/73 (!) 159/89   Temp 97.7 °F (36.5 °C) 98.1 °F (36.7 °C)   Pulse 93 105   Resp 18 18   Weight 263 lb 14.3 oz (119.7 kg) 257 lb 0.9 oz (116.6 kg)   Weight Method Actual;Bed scale Bed scale   Percent Weight Change 2.31 -2.59   Dry Weight 257 lb 15 oz (117 kg)  --    Treatment Initiation   Dialyze Hours 3.5  --    Post-Hemodialysis Assessment   Rinseback Volume (ml)  --  400 ml   Total Liters Processed (l/min)  --  77.8 l/min   Dialyzer Clearance  --  Moderately streaked   Duration of Treatment (minutes)  --  210 minutes   Heparin amount administered during treatment (units)  --  0 units   Hemodialysis Intake (ml)  --  0 ml   Hemodialysis Output (ml)  --  4000 ml   NET Removed (ml)  --  3600 ml   Tolerated Treatment  --  Good   Tolerated 3.5 hour HD tx without difficulty, net UF 3.6L to below dry wt by.4kg. Vanc not recd in time to give, floor RN made aware and given report. Tx record faxed to floor for scan into the EMR. Transported to room in stable condition.

## 2021-02-01 NOTE — PROGRESS NOTES
Nephrology Progress Note   [email or web address]      This patient is a 44year old male whom we are following for ESRD. Subjective: The patient was seen and examined on HD. BP is high. No headache or chest pain. Family History: No family at bedside  ROS: No nausea or vomiting      Vitals:  BP (!) 141/81   Pulse 82   Temp 97.8 °F (36.6 °C) (Oral)   Resp 16   Ht 5' 9\" (1.753 m)   Wt 257 lb 15 oz (117 kg)   SpO2 95%   BMI 38.09 kg/m²   I/O last 3 completed shifts: In: 1200 [P.O.:1200]  Out: 350 [Urine:350]  No intake/output data recorded. Physical Exam:  Physical Exam  Vitals signs reviewed. Constitutional:       General: He is not in acute distress. Appearance: Normal appearance. HENT:      Head: Normocephalic and atraumatic. Eyes:      General: No scleral icterus. Conjunctiva/sclera: Conjunctivae normal.   Cardiovascular:      Rate and Rhythm: Normal rate. Heart sounds: No friction rub. Pulmonary:      Effort: Pulmonary effort is normal. No respiratory distress. Breath sounds: Rales present. Abdominal:      General: Bowel sounds are normal. There is no distension. Tenderness: There is no abdominal tenderness. Musculoskeletal:      Right lower leg: Edema present. Left lower leg: Edema present. Neurological:      Mental Status: He is alert.        Access: RIJ TDC      Medications:   vancomycin  1,000 mg Intravenous Once    aspirin  81 mg Oral Daily    Calcium Acetate (Phos Binder)  1 capsule Oral Daily    citalopram  40 mg Oral Daily    clopidogrel  75 mg Oral Daily    DULoxetine  30 mg Oral Daily    fluticasone  2 spray Each Nostril BID    hydrALAZINE  50 mg Oral 3 times per day    insulin glargine  70 Units Subcutaneous Nightly    isosorbide mononitrate  30 mg Oral Daily    linaclotide  145 mcg Oral QAM AC    losartan  50 mg Oral Daily    pantoprazole  40 mg Oral QAM AC    pravastatin  80 mg Oral Nightly    pregabalin  25 mg Oral TID   [place]

## 2021-02-01 NOTE — PROGRESS NOTES
Podiatry Progress Note    Subjective:   Patient was seen at bedside this morning. No new pedal complaints. He denied n/v/f/c and SOB. Objective:   Vitals  BP (!) 141/81   Pulse 82   Temp 97.8 °F (36.6 °C) (Oral)   Resp 16   Ht 5' 9\" (1.753 m)   Wt 257 lb 15 oz (117 kg)   SpO2 95%   BMI 38.09 kg/m²   Integument:  Eschar seen at the anterior right and left ankle as well as the medial right hallux and dorsal right fifth toe. Minimal erythema. No active drainage. No proximal red streaking. No fluctuance. No evidence of deep space abscess. Nails: The medial border of the right hallux toe nail is ingrown. Neurologic:  Protective sensation is grossly diminished to light touch at the level of the toes, bilateral.  Vascular:  DP and PT pulses are weakly palpable, bilateral.  CFT is less than five seconds at all toes. Mild edema at both feet  Musculoskeletal:  Patient has 5/5 strength on inversion/everison/dorsiflexion/plantarflexion, bilateral.  No gross musculoskeletal deformities noted. Pain on palpation at the medial border of the right hallux toe nail.      Labs:   CBC with Differential:    Lab Results   Component Value Date    WBC 8.4 02/01/2021    RBC 3.35 02/01/2021    HGB 9.8 02/01/2021    HCT 29.6 02/01/2021     02/01/2021    MCV 88.3 02/01/2021    MCH 29.2 02/01/2021    MCHC 33.1 02/01/2021    RDW 14.4 02/01/2021    SEGSPCT 73.4 05/17/2013    BANDSPCT 5 12/21/2020    METASPCT 1 12/21/2020    LYMPHOPCT 12.6 01/30/2021    MONOPCT 6.9 01/30/2021    MYELOPCT 0 12/13/2020    BASOPCT 0.8 01/30/2021    MONOSABS 0.5 01/30/2021    LYMPHSABS 1.0 01/30/2021    EOSABS 0.3 01/30/2021    BASOSABS 0.1 01/30/2021    DIFFTYPE Auto 05/17/2013     CMP:    Lab Results   Component Value Date     01/31/2021    K 3.5 01/31/2021    K 3.7 01/30/2021    CL 99 01/31/2021    CO2 24 01/31/2021    BUN 37 01/31/2021    CREATININE 4.5 01/31/2021    GFRAA 17 01/31/2021    GFRAA >60 05/17/2013    AGRATIO 1.2

## 2021-02-01 NOTE — CARE COORDINATION
VM received from Tino Mortimer  ([phone]), for updates on patient discharge plan. Notes that pt with frequent bounce backs to hospital. CM attempted to return call. No answer. VM left for call back. Per chart review: pt with 2 recent admissions. Last admission pt refused skilled nursing facility at discharge. Recent admissions:  12/4-12/21 12/30-1/5    2:49 PM  [facility] Gerhardt Angst case mgmt. Updates provided by ETHAN. Will f/u with this CM tomorrow for additional updates regarding vascular study. [facility], pt is likely to refuse SNF this admission. states intermittently non-compliant with hemodialysis and \"does not take care of himself\".     Ora Emerson RN

## 2021-02-01 NOTE — CONSULTS
Vascular Surgery Consultation    Date of Admission:  1/29/2021  2:31 PM  Date of Consultation:  2/1/2021    PCP:  Ester Rosenthal MD       Chief Complaint:     History of Present Illness: We are asked to see this patient in consultation by Dr. Kenneth Abdi regarding PVD. [place] is a 44 y.o. male who has a history of PVD, S/P Iliac stenting and L SFA stenting. He was seen during last admit with c/o leg weakness, not thought to be of Vascular origin. He did have an ankle wound and toe ulcer. He was instructed to call office after discharge to schedule angiogram.  This was not done. He presents now with RLE cellulitis. Ulcer R Hallux has been present for months without healing.        Past Medical History:  Past Medical History:   Diagnosis Date    Ambulatory dysfunction     walker for long distances, SOB with distance    Aortic stenosis     echo 2017    Arthritis     hands and hips    Asthma     Bilateral hilar adenopathy syndrome 6/3/2013    CAD (coronary artery disease)     Dr. Tiago Dominguez [facility]) 04/19/2019    EF= 43%    CHF (congestive heart failure) (HCC)     Chronic pain     COPD (chronic obstructive pulmonary disease) ([address]     pulmonology Dr. Kaylene Ortega    Depression     Diabetes mellitus ([address]     borderline    Difficult intravenous access     Emphysema of lung ([address]     ESRD (end stage renal disease) on dialysis ([address]     MWF    Fear of needles     Gastric ulcer     GERD (gastroesophageal reflux disease)     Heart valve problem     bicuspic valve    Hemodialysis patient ([address]     History of spinal fracture     work incident    Hx of blood clots     Bilateral lower extremities; stents in place    Hyperlipidemia     Hypertension     MI (myocardial infarction) ([address] 2019    has had 9 MIs. 2019 was the last    Neuromuscular disorder ([address]     due to CVA    Numbness and tingling in left arm     from fistula    Pneumonia     PONV (postoperative nausea and vomiting)     Prolonged emergence from general anesthesia     States requires more medication than most people    Sleep apnea     Uses CPAP    Stroke ([facility]     7mm thalamic cva 2017 deficts left side, left side weakness    TIA (transient ischemic attack)     Unspecified diseases of blood and blood-forming organs        Past Surgical History:  Past Surgical History:   Procedure Laterality Date    AORTIC VALVE REPLACEMENT N/A 10/15/2019    TRANSCATHETER AORTIC VALVE REPLACEMENT FEMORAL APPROACH performed by Vadim Valera MD at [address] [place] [address] Ave Right 7/2/2019    PERITONEAL DIALYSIS CATHETER REMOVAL performed by Rupinder Mauricio MD at [facility] COLONOSCOPY  2/29/2015    WN    CORONARY ANGIOPLASTY WITH STENT PLACEMENT  05/26/15    CYST REMOVAL  08/14/2013    EXCISION CYSTS, NECK X2 AND ABDOMINAL benign    DIAGNOSTIC CARDIAC CATH LAB PROCEDURE      DIALYSIS FISTULA CREATION Left 10/30/2017    LEFT BRACHIAL CEPHALIC FISTULA    DIALYSIS FISTULA CREATION Left 3/27/2019    LIGATION  AV FISTULA performed by Sheffield Denver, MD at [facility], COLON, DIAGNOSTIC      OTHER SURGICAL HISTORY  02/01/2017    laparoscopic cholecystectomy with intraoperative cholangiogram    OTHER SURGICAL HISTORY  2018    PORT PLACEMENT  - vas cath    OTHER SURGICAL HISTORY Bilateral 06/26/2018    laprascopic peritoneal dialysis catheter placement    OTHER SURGICAL HISTORY Right 09/2018    peritoneal dialysis port placed on right side of abdomen    OTHER SURGICAL HISTORY  05/28/2019    PTA/Stenting R External Iliac artery    AL LAP INSERTION TUNNELED INTRAPERITONEAL CATHETER N/A 9/21/2018    LAPAROSCOPIC PERITONEAL DIALYSIS CATHETER REPLACEMENT performed by Rupinder Mauricio MD at [address] [place] [address]  01/06/2016    UPPER GASTROINTESTINAL ENDOSCOPY  01/29/2017    possible candida, otherwise normal appearing    VASCULAR SURGERY  aprx 2 years ago    2 stents placed, each side of groin       Home Medications:   Prior to Admission medications    Medication Sig Start Date End Date Taking? Authorizing Provider   oxyCODONE-acetaminophen (PERCOCET) 7.5-325 MG per tablet Take 1 tablet by mouth every 4 hours as needed for Pain. Yes Historical Provider, MD   glucose monitoring kit (FREESTYLE) monitoring kit 1 kit by Does not apply route daily 1/8/21   Lanie Medina MD   blood glucose test strips (GLUCOSE METER TEST) strip 1 each by In Vitro route 5 times daily As needed. 1/8/21   Lanie Medina MD   nitroGLYCERIN (NITROSTAT) 0.4 MG SL tablet DISSOLVE 1 TABLET UNDER THE TONGUE AS NEEDED FOR CHEST PAIN EVERY 5 MINUTES UP TO 3 TIMES. IF NO RELIEF CALL 911. 1/7/21   Lanie Medina MD   Insulin Pen Needle 31G X 5 MM MISC 1 each by Does not apply route 4 times daily 12/28/20   JAY Haro CNP   hydrOXYzine (VISTARIL) 50 MG capsule TAKE 1 TO 2 CAPSULES BY MOUTH NIGHTLY 12/21/20   Soren Turpin MD   pregabalin (LYRICA) 25 MG capsule Take 1 capsule by mouth 3 times daily for 5 days.  12/18/20 12/23/20  JAY Gallagher CNP   lidocaine 4 % external patch Place 1 patch onto the skin daily 12/19/20   JAY Gallagher CNP   hydrALAZINE (APRESOLINE) 50 MG tablet TAKE 1/2 TABLET BY MOUTH EVERY 8 HOURS 11/24/20   Lanie Medina MD   B Complex-C-Folic Acid (VIRT-CAPS) 1 MG CAPS TK ONE C PO  QD 11/18/20   Lanie Medina MD   Continuous Blood Gluc Sensor (FREESTYLE STEPHANY 14 DAY SENSOR) MISC 1 each by Does not apply route every 14 days 11/10/20   Lanie Medina MD   insulin glargine [facility]) 100 UNIT/ML injection pen Inject 70 Units into the skin nightly 11/10/20   Lanie Medina MD   traZODone (DESYREL) 150 MG tablet TAKE (1) TABLET BY MOUTH NIGHTLY 11/4/20   Lanie Medina MD   citalopram (CELEXA) 40 MG tablet TAKE (1) TABLET BY MOUTH DAILY 11/4/20   Lanie Medina MD   insulin aspart (Gerri Lópezk FLEXPEN) 100 UNIT/ML injection pen Inject 20 Units into the skin 3 times daily (before meals) 10/12/20   Yas Mcdowell MD   clopidogrel (PLAVIX) 75 MG tablet TAKE 1 TABLET BY MOUTH ONCE DAILY 10/2/20   Alfred Garcia MD   pravastatin (PRAVACHOL) 80 MG tablet TAKE (1) TABLET BY MOUTH ONCE DAILY 9/29/20   Alfred Garcia MD   QUEtiapine (SEROQUEL) 50 MG tablet TAKE 1 TABLET BY MOUTH EVERY EVENING 9/28/20   Yas Mcdowell MD   isosorbide mononitrate (IMDUR) 30 MG extended release tablet TAKE 1 TABLET BY MOUTH EVERY DAY 9/1/20   Yas Mcdowell MD   torsemide (DEMADEX) 100 MG tablet Take 1-2 tablets by mouth daily 8/5/20   Yas Mcdowell MD   ondansetron (ZOFRAN ODT) 4 MG disintegrating tablet Take 1 tablet by mouth every 8 hours as needed for Nausea 8/5/20   Yas Mcdowell MD   LINZESS 145 MCG capsule TAKE 1 CAPSULE BY MOUTH EVERY MORNING BEFORE BREAKFAST 7/6/20   Yas Mcdowell MD   Calcium Acetate, Phos Binder, 667 MG CAPS TAKE 1 CAPSULE BY MOUTH THREE TIMES DAILY WITH MEALS 6/29/20   Yas Mcdowell MD   tiZANidine (ZANAFLEX) 4 MG tablet TAKE 1 TABLET BY MOUTH THREE TIMES DAILY 6/4/20   Yas Mcdowell MD   DULoxetine (CYMBALTA) 30 MG extended release capsule TAKE 1 CAPSULE BY MOUTH EVERY DAY 4/3/20   Lindajean Harada, APRN - CNP   nystatin-triamcinolone (MYCOLOG II) 384079-1.7 UNIT/GM-% cream Apply topically 2 times daily. 3/16/20   JAY Lamas CNP   losartan (COZAAR) 50 MG tablet TAKE (1) TABLET BY MOUTH DAILY 3/10/20   Yas Mcdowell MD   pantoprazole (PROTONIX) 40 MG tablet TAKE (1) TABLET BY MOUTH EACH MORNING BEFORE BREAKFAST 3/10/20   Yas Mcdowell MD   albuterol (PROVENTIL) (2.5 MG/3ML) 0.083% nebulizer solution INHALE 1 VIAL VIA NEBULIZER EVERY 6 HOURS AS NEEDED FOR WHEEZING 3/10/20   Yas Mcdowell MD   nystatin (MYCOSTATIN) 175645 UNIT/GM cream Apply topically 2 times daily.  3/4/20   Yas Mcdowell MD   Insulin Syringe-Needle U-100 30G X 1/2\" 0.5 ML MISC 1 each by Does not apply route daily 3/4/20   Carl Temple MD   blood glucose test strips (FREESTYLE LITE) strip Daily As needed. 8/19/19   Carl Temple MD   glucose monitoring kit (FREESTYLE) monitoring kit 1 kit by Does not apply route daily 8/19/19   Carl Temple MD   vitamin D (ERGOCALCIFEROL) 44127 units CAPS capsule TK 1 C PO WEEKLY 6/2/19   Historical Provider, MD   flunisolide (NASALIDE) 25 MCG/ACT (0.025%) SOLN Inhale 2 sprays into the lungs every 12 hours 5/22/19   Pilo Mae MD   Tiotropium Bromide-Olodaterol (STIOLTO RESPIMAT) 2.5-2.5 MCG/ACT AERS Inhale 2 puffs into the lungs daily 5/21/19   Pilo Mae MD   Polyethylene Glycol 3350 GRAN  5/2/18   Historical Provider, MD   Glucose Blood (BLOOD GLUCOSE TEST STRIPS) STRP TEST 3-4 TIMES DAILY, AS DIRECTED 4/25/18   Ruby Griffith MD   Blood Glucose Monitoring Suppl ADAM USE AS DIRECTED. 4/25/18   Ruby Griffith MD   Alcohol Swabs PADS USE AS DIRECTED 4/25/18   Ruby Griffith MD   albuterol sulfate  (90 Base) MCG/ACT inhaler Inhale 2 puffs into the lungs every 6 hours as needed for Wheezing 11/8/17   Olimpia Smith MD   ipratropium-albuterol (DUONEB) 0.5-2.5 (3) MG/3ML SOLN nebulizer solution Inhale 3 mLs into the lungs every 6 hours as needed for Shortness of Breath 10/15/17   Hal Person MD   Lancets MISC Test daily 5/25/17   Darshan Alvarado MD   calcium carbonate (TUMS) 500 MG chewable tablet Take 1 tablet by mouth 3 times daily as needed for Heartburn. Historical Provider, MD   aspirin 81 MG chewable tablet Take 1 tablet by mouth daily.   Patient taking differently: Take 81 mg by mouth daily Indications: stopped on 6/25 for surgery  5/14/13   Jay Tan MD        Facility Administered Medications:    aspirin  81 mg Oral Daily    Calcium Acetate (Phos Binder)  1 capsule Oral Daily    citalopram  40 mg Oral Daily    clopidogrel  75 mg Oral Daily    DULoxetine  30 mg Oral Daily    fluticasone  2 spray Each Nostril BID    hydrALAZINE  50 mg Oral 3 times per day    insulin glargine  70 Units Subcutaneous Nightly    isosorbide mononitrate  30 mg Oral Daily    linaclotide  145 mcg Oral QAM AC    losartan  50 mg Oral Daily    pantoprazole  40 mg Oral QAM AC    pravastatin  80 mg Oral Nightly    pregabalin  25 mg Oral TID    QUEtiapine  50 mg Oral QPM    torsemide  100 mg Oral Daily    vitamin D  50,000 Units Oral Weekly    sodium chloride flush  10 mL Intravenous 2 times per day    nicotine  1 patch Transdermal Daily    heparin (porcine)  5,000 Units Subcutaneous 3 times per day    insulin lispro  0-12 Units Subcutaneous TID WC    insulin lispro  0-6 Units Subcutaneous Nightly    cefepime  1,000 mg Intravenous Q24H    vancomycin (VANCOCIN) intermittent dosing (placeholder)   Other RX Placeholder    b complex-C-folic acid  1 capsule Oral Daily       Allergies:  Morphine     Social History:      Social History     Socioeconomic History    Marital status:      Spouse name: Not on file    Number of children: Not on file    Years of education: Not on file    Highest education level: Not on file   Occupational History    Not on file   Social Needs    Financial resource strain: Not on file    Food insecurity     Worry: Not on file     Inability: Not on file    Transportation needs     Medical: Not on file     Non-medical: Not on file   Tobacco Use    Smoking status: Current Every Day Smoker     Packs/day: 0.50     Years: 33.00     Pack years: 16.50     Types: Cigarettes     Last attempt to quit: 2020     Years since quittin.7    Smokeless tobacco: Never Used   Substance and Sexual Activity    Alcohol use: Not Currently     Alcohol/week: 0.0 standard drinks     Comment: occ    Drug use: No    Sexual activity: Yes     Partners: Female     Comment:    Lifestyle    Physical activity     Days per week: Not on file     Minutes per session: Not on file    Stress: Not on file   Relationships    Social connections     Talks on phone: Not on file     Gets together: Not on file     Attends Synagogue service: Not on file     Active member of club or organization: Not on file     Attends meetings of clubs or organizations: Not on file     Relationship status: Not on file    Intimate partner violence     Fear of current or ex partner: Not on file     Emotionally abused: Not on file     Physically abused: Not on file     Forced sexual activity: Not on file   Other Topics Concern    Not on file   Social History Narrative    Not on file       Family History:        Problem Relation Age of Onset    Diabetes Mother     Heart Disease Father     Kidney Disease Sister         stage 4-kidney failure    Cancer Sister     Heart Disease Sister     Obesity Sister     Cancer Sister     Heart Disease Sister     Obesity Sister     Alcohol Abuse Brother        Review of Systems:  A 14 point review of systems was completed. Pertinent positives identified in the HPI, all other review of systems negative. Physical Examination:    BP (!) 141/81   Pulse 82   Temp 97.8 °F (36.6 °C) (Oral)   Resp 16   Ht 5' 9\" (1.753 m)   Wt 257 lb 15 oz (117 kg)   SpO2 95%   BMI 38.09 kg/m²        Admission Weight: 242 lb (109.8 kg)       General appearance: NAD, obese  Eyes: PERRLA  Neck: no JVD, no lymphadenopathy. Respiratory: effort is unlabored, no crackles, wheezes or rubs. Cardiovascular: regular, no murmur. No carotid bruits. Pulses:    femoral DP PT   RIGHT 1 doppler doppler   LEFT 2 1 1   GI: abdomen soft, nondistended, no organomegaly. Musculoskeletal: strength and tone normal.  Extremities: Bilateral anterior ankle eschars. R Hallux ulcer/eschar  Neuro/psychiatric: grossly intact.       ASA 3 - Patient with moderate systemic disease with functional limitations    Mallampati Airway Assessment:  Mallampati Class III - (soft palate & base of uvula are visible)    MEDICAL DECISION MAKING/TESTING        CT: 1/1/2021 personally reviewed and interpreted. Agree with reported findings        Impression   Vascular-       Aortoiliac inflow disease.  On the right, the iliac stents are distally   occluded.  On the left there is moderate stenosis the proximal common iliac   artery with patent distal stents.       Femoropopliteal disease, multifocal bilaterally.  On the right there are   multiple mild-to-moderate stenoses and severe stenosis adductor canal and   very distal popliteal.  On the left, there are multiple mild-to-moderate   stenoses.  There appears to be a focal moderate stenosis in the mid SFA,   immediately proximal to the overlapping stents.       Trifurcation disease, multifocal bilaterally.  On the right, there is primary   runoff.  The posterior tibial.  On the left, there is three-vessel runoff,   although there appear to be focal high-grade stenoses in the proximal   anterior and posterior tibial arteries.                   Labs:   CBC:   Recent Labs     01/29/21  1518 01/30/21  0747 01/31/21  0814   WBC 11.1* 7.9 9.3   HGB 10.6* 10.0* 10.2*   HCT 32.8* 30.5* 30.2*   MCV 88.9 88.5 87.6    272 254     BMP:   Recent Labs     01/29/21  1518 01/30/21  0747 01/31/21  0814    138 137   K 4.1 3.7 3.5   CL 99 103 99   CO2 25 21 24   BUN 67* 71* 37*   CREATININE 5.4* 5.7* 4.5*   CALCIUM 9.3 8.6 8.6     Cardiac Enzymes:   Recent Labs     01/29/21  1518 01/29/21  1919 01/29/21  2319   TROPONINI 0.07* 0.06* 0.06*     PT/INR: No results for input(s): PROTIME, INR in the last 72 hours. APTT: No results for input(s): APTT in the last 72 hours.   Liver Profile:  Lab Results   Component Value Date    AST 10 01/30/2021    ALT 10 01/30/2021    BILIDIR <0.2 10/10/2019    BILITOT <0.2 01/30/2021    ALKPHOS 84 01/30/2021     Lab Results   Component Value Date    CHOL 170 08/22/2019    HDL 49 08/22/2019    TRIG 231 08/22/2019     TSH:  Lab Results   Component Value Date    TSH 0.90 06/05/2020     UA:   Lab Results

## 2021-02-02 ENCOUNTER — APPOINTMENT (OUTPATIENT)
Dept: CARDIAC CATH/INVASIVE PROCEDURES | Age: 51
DRG: 252 | End: 2021-02-02
Payer: COMMERCIAL

## 2021-02-02 LAB
BLOOD CULTURE, ROUTINE: NORMAL
CULTURE, BLOOD 2: NORMAL
GLUCOSE BLD-MCNC: 104 MG/DL (ref 70–99)
GLUCOSE BLD-MCNC: 111 MG/DL (ref 70–99)
GLUCOSE BLD-MCNC: 142 MG/DL (ref 70–99)
HEPATITIS B SURFACE ANTIGEN INTERPRETATION: NORMAL
PERFORMED ON: ABNORMAL

## 2021-02-02 PROCEDURE — 6370000000 HC RX 637 (ALT 250 FOR IP)

## 2021-02-02 PROCEDURE — B41D1ZZ FLUOROSCOPY OF AORTA AND BILATERAL LOWER EXTREMITY ARTERIES USING LOW OSMOLAR CONTRAST: ICD-10-PCS | Performed by: SURGERY

## 2021-02-02 PROCEDURE — 2060000000 HC ICU INTERMEDIATE R&B

## 2021-02-02 PROCEDURE — 6370000000 HC RX 637 (ALT 250 FOR IP): Performed by: INTERNAL MEDICINE

## 2021-02-02 PROCEDURE — 6360000004 HC RX CONTRAST MEDICATION

## 2021-02-02 PROCEDURE — 2500000003 HC RX 250 WO HCPCS

## 2021-02-02 PROCEDURE — C2623 CATH, TRANSLUMIN, DRUG-COAT: HCPCS

## 2021-02-02 PROCEDURE — 37221 PR REVSC OPN/PRQ ILIAC ART W/STNT PLMT & ANGIOPLSTY: CPT | Performed by: SURGERY

## 2021-02-02 PROCEDURE — C1714 CATH, TRANS ATHERECTOMY, DIR: HCPCS

## 2021-02-02 PROCEDURE — 87340 HEPATITIS B SURFACE AG IA: CPT

## 2021-02-02 PROCEDURE — 37221 HC ILIAC TERRITORY PLASTY STENT: CPT

## 2021-02-02 PROCEDURE — 94761 N-INVAS EAR/PLS OXIMETRY MLT: CPT

## 2021-02-02 PROCEDURE — 047P3ZZ DILATION OF RIGHT ANTERIOR TIBIAL ARTERY, PERCUTANEOUS APPROACH: ICD-10-PCS | Performed by: SURGERY

## 2021-02-02 PROCEDURE — 99153 MOD SED SAME PHYS/QHP EA: CPT

## 2021-02-02 PROCEDURE — 99152 MOD SED SAME PHYS/QHP 5/>YRS: CPT | Performed by: SURGERY

## 2021-02-02 PROCEDURE — 75710 ARTERY X-RAYS ARM/LEG: CPT | Performed by: SURGERY

## 2021-02-02 PROCEDURE — C1726 CATH, BAL DIL, NON-VASCULAR: HCPCS

## 2021-02-02 PROCEDURE — 37232 HC TIB PER TERR ADDL PLASTY: CPT

## 2021-02-02 PROCEDURE — 37229 PR REVSC OPN/PRQ TIB/PERO W/ATHRC/ANGIOP SM VSL: CPT | Performed by: SURGERY

## 2021-02-02 PROCEDURE — 2580000003 HC RX 258: Performed by: INTERNAL MEDICINE

## 2021-02-02 PROCEDURE — 85347 COAGULATION TIME ACTIVATED: CPT

## 2021-02-02 PROCEDURE — C1769 GUIDE WIRE: HCPCS

## 2021-02-02 PROCEDURE — 6370000000 HC RX 637 (ALT 250 FOR IP): Performed by: NURSE PRACTITIONER

## 2021-02-02 PROCEDURE — 047K3Z1 DILATION OF RIGHT FEMORAL ARTERY USING DRUG-COATED BALLOON, PERCUTANEOUS APPROACH: ICD-10-PCS | Performed by: SURGERY

## 2021-02-02 PROCEDURE — 047M3ZZ DILATION OF RIGHT POPLITEAL ARTERY, PERCUTANEOUS APPROACH: ICD-10-PCS | Performed by: SURGERY

## 2021-02-02 PROCEDURE — 37232 PR REVSC OPN/PRQ TIB/PERO W/ANGIOPLASTY UNI EA VSL: CPT | Performed by: SURGERY

## 2021-02-02 PROCEDURE — 6360000002 HC RX W HCPCS: Performed by: INTERNAL MEDICINE

## 2021-02-02 PROCEDURE — 047H3DZ DILATION OF RIGHT EXTERNAL ILIAC ARTERY WITH INTRALUMINAL DEVICE, PERCUTANEOUS APPROACH: ICD-10-PCS | Performed by: SURGERY

## 2021-02-02 PROCEDURE — 6360000002 HC RX W HCPCS

## 2021-02-02 PROCEDURE — 37229 HC TIB PER TERRITORY ATHER: CPT

## 2021-02-02 PROCEDURE — 2709999900 HC NON-CHARGEABLE SUPPLY

## 2021-02-02 PROCEDURE — C1876 STENT, NON-COA/NON-COV W/DEL: HCPCS

## 2021-02-02 PROCEDURE — 37224 PR REVSC OPN/PRG FEM/POP W/ANGIOPLASTY UNI: CPT | Performed by: SURGERY

## 2021-02-02 PROCEDURE — C1725 CATH, TRANSLUMIN NON-LASER: HCPCS

## 2021-02-02 PROCEDURE — 99152 MOD SED SAME PHYS/QHP 5/>YRS: CPT

## 2021-02-02 PROCEDURE — 75716 ARTERY X-RAYS ARMS/LEGS: CPT

## 2021-02-02 PROCEDURE — 94660 CPAP INITIATION&MGMT: CPT

## 2021-02-02 PROCEDURE — C1887 CATHETER, GUIDING: HCPCS

## 2021-02-02 PROCEDURE — 37224 HC FEM POP TERRITORY PLASTY: CPT

## 2021-02-02 PROCEDURE — 047T3ZZ DILATION OF RIGHT PERONEAL ARTERY, PERCUTANEOUS APPROACH: ICD-10-PCS | Performed by: SURGERY

## 2021-02-02 PROCEDURE — C1760 CLOSURE DEV, VASC: HCPCS

## 2021-02-02 PROCEDURE — 76937 US GUIDE VASCULAR ACCESS: CPT | Performed by: SURGERY

## 2021-02-02 PROCEDURE — 75774 ARTERY X-RAY EACH VESSEL: CPT

## 2021-02-02 PROCEDURE — C1894 INTRO/SHEATH, NON-LASER: HCPCS

## 2021-02-02 PROCEDURE — 2700000000 HC OXYGEN THERAPY PER DAY

## 2021-02-02 RX ORDER — HYDRALAZINE HYDROCHLORIDE 20 MG/ML
INJECTION INTRAMUSCULAR; INTRAVENOUS
Status: COMPLETED | OUTPATIENT
Start: 2021-02-02 | End: 2021-02-02

## 2021-02-02 RX ORDER — MIDAZOLAM HYDROCHLORIDE 5 MG/ML
INJECTION INTRAMUSCULAR; INTRAVENOUS
Status: COMPLETED | OUTPATIENT
Start: 2021-02-02 | End: 2021-02-02

## 2021-02-02 RX ORDER — HEPARIN SODIUM 1000 [USP'U]/ML
INJECTION, SOLUTION INTRAVENOUS; SUBCUTANEOUS
Status: COMPLETED | OUTPATIENT
Start: 2021-02-02 | End: 2021-02-02

## 2021-02-02 RX ORDER — FENTANYL CITRATE 50 UG/ML
INJECTION, SOLUTION INTRAMUSCULAR; INTRAVENOUS
Status: COMPLETED | OUTPATIENT
Start: 2021-02-02 | End: 2021-02-02

## 2021-02-02 RX ORDER — ASPIRIN 81 MG/1
81 TABLET, CHEWABLE ORAL ONCE
Status: DISCONTINUED | OUTPATIENT
Start: 2021-02-02 | End: 2021-02-02

## 2021-02-02 RX ORDER — HYDROMORPHONE HCL 110MG/55ML
1 PATIENT CONTROLLED ANALGESIA SYRINGE INTRAVENOUS EVERY 4 HOURS PRN
Status: DISCONTINUED | OUTPATIENT
Start: 2021-02-02 | End: 2021-02-03 | Stop reason: HOSPADM

## 2021-02-02 RX ORDER — CLOPIDOGREL BISULFATE 75 MG/1
150 TABLET ORAL ONCE
Status: COMPLETED | OUTPATIENT
Start: 2021-02-02 | End: 2021-02-02

## 2021-02-02 RX ADMIN — INSULIN LISPRO 2 UNITS: 100 INJECTION, SOLUTION INTRAVENOUS; SUBCUTANEOUS at 16:51

## 2021-02-02 RX ADMIN — HYDRALAZINE HYDROCHLORIDE 50 MG: 50 TABLET, FILM COATED ORAL at 22:40

## 2021-02-02 RX ADMIN — CLOPIDOGREL BISULFATE 75 MG: 75 TABLET ORAL at 08:26

## 2021-02-02 RX ADMIN — FENTANYL CITRATE 50 MCG: 50 INJECTION, SOLUTION INTRAMUSCULAR; INTRAVENOUS at 09:13

## 2021-02-02 RX ADMIN — OXYCODONE HYDROCHLORIDE AND ACETAMINOPHEN 2 TABLET: 7.5; 325 TABLET ORAL at 22:35

## 2021-02-02 RX ADMIN — PROMETHAZINE HYDROCHLORIDE 12.5 MG: 25 TABLET ORAL at 22:45

## 2021-02-02 RX ADMIN — CLOPIDOGREL BISULFATE 150 MG: 75 TABLET ORAL at 12:07

## 2021-02-02 RX ADMIN — FENTANYL CITRATE 50 MCG: 50 INJECTION, SOLUTION INTRAMUSCULAR; INTRAVENOUS at 11:42

## 2021-02-02 RX ADMIN — MIDAZOLAM HYDROCHLORIDE 1 MG: 5 INJECTION INTRAMUSCULAR; INTRAVENOUS at 09:20

## 2021-02-02 RX ADMIN — HEPARIN SODIUM 5000 UNITS: 5000 INJECTION INTRAVENOUS; SUBCUTANEOUS at 22:43

## 2021-02-02 RX ADMIN — MIDAZOLAM HYDROCHLORIDE 1 MG: 5 INJECTION INTRAMUSCULAR; INTRAVENOUS at 11:42

## 2021-02-02 RX ADMIN — HEPARIN SODIUM 1000 UNITS: 1000 INJECTION, SOLUTION INTRAVENOUS; SUBCUTANEOUS at 10:44

## 2021-02-02 RX ADMIN — FENTANYL CITRATE 25 MCG: 50 INJECTION, SOLUTION INTRAMUSCULAR; INTRAVENOUS at 10:02

## 2021-02-02 RX ADMIN — MIDAZOLAM HYDROCHLORIDE 1 MG: 5 INJECTION INTRAMUSCULAR; INTRAVENOUS at 10:43

## 2021-02-02 RX ADMIN — ASPIRIN 81 MG: 81 TABLET, CHEWABLE ORAL at 08:32

## 2021-02-02 RX ADMIN — SIMETHICONE 80 MG: 80 TABLET, CHEWABLE ORAL at 17:04

## 2021-02-02 RX ADMIN — FENTANYL CITRATE 25 MCG: 50 INJECTION, SOLUTION INTRAMUSCULAR; INTRAVENOUS at 09:42

## 2021-02-02 RX ADMIN — MIDAZOLAM HYDROCHLORIDE 1 MG: 5 INJECTION INTRAMUSCULAR; INTRAVENOUS at 10:02

## 2021-02-02 RX ADMIN — CEFEPIME HYDROCHLORIDE 1000 MG: 1 INJECTION, POWDER, FOR SOLUTION INTRAMUSCULAR; INTRAVENOUS at 16:51

## 2021-02-02 RX ADMIN — HYDROMORPHONE HYDROCHLORIDE 1 MG: 2 INJECTION, SOLUTION INTRAMUSCULAR; INTRAVENOUS; SUBCUTANEOUS at 16:49

## 2021-02-02 RX ADMIN — HYDRALAZINE HYDROCHLORIDE 50 MG: 50 TABLET, FILM COATED ORAL at 06:19

## 2021-02-02 RX ADMIN — FENTANYL CITRATE 25 MCG: 50 INJECTION, SOLUTION INTRAMUSCULAR; INTRAVENOUS at 09:20

## 2021-02-02 RX ADMIN — HEPARIN SODIUM 5000 UNITS: 1000 INJECTION, SOLUTION INTRAVENOUS; SUBCUTANEOUS at 09:26

## 2021-02-02 RX ADMIN — QUETIAPINE FUMARATE 50 MG: 25 TABLET ORAL at 16:50

## 2021-02-02 RX ADMIN — INSULIN GLARGINE 70 UNITS: 100 INJECTION, SOLUTION SUBCUTANEOUS at 23:19

## 2021-02-02 RX ADMIN — ONDANSETRON 4 MG: 2 INJECTION INTRAMUSCULAR; INTRAVENOUS at 16:48

## 2021-02-02 RX ADMIN — Medication 10 ML: at 22:42

## 2021-02-02 RX ADMIN — HEPARIN SODIUM 5000 UNITS: 5000 INJECTION INTRAVENOUS; SUBCUTANEOUS at 06:19

## 2021-02-02 RX ADMIN — HYDRALAZINE HYDROCHLORIDE 10 MG: 20 INJECTION INTRAMUSCULAR; INTRAVENOUS at 10:50

## 2021-02-02 RX ADMIN — HEPARIN SODIUM 2000 UNITS: 1000 INJECTION, SOLUTION INTRAVENOUS; SUBCUTANEOUS at 10:18

## 2021-02-02 RX ADMIN — HYDRALAZINE HYDROCHLORIDE 10 MG: 20 INJECTION INTRAMUSCULAR; INTRAVENOUS at 11:45

## 2021-02-02 RX ADMIN — FENTANYL CITRATE 25 MCG: 50 INJECTION, SOLUTION INTRAMUSCULAR; INTRAVENOUS at 10:43

## 2021-02-02 RX ADMIN — MIDAZOLAM HYDROCHLORIDE 2 MG: 5 INJECTION INTRAMUSCULAR; INTRAVENOUS at 09:13

## 2021-02-02 RX ADMIN — OXYCODONE HYDROCHLORIDE AND ACETAMINOPHEN 2 TABLET: 7.5; 325 TABLET ORAL at 08:23

## 2021-02-02 RX ADMIN — PRAVASTATIN SODIUM 80 MG: 80 TABLET ORAL at 22:40

## 2021-02-02 RX ADMIN — OXYCODONE HYDROCHLORIDE AND ACETAMINOPHEN 2 TABLET: 7.5; 325 TABLET ORAL at 03:20

## 2021-02-02 RX ADMIN — OXYCODONE HYDROCHLORIDE AND ACETAMINOPHEN 2 TABLET: 7.5; 325 TABLET ORAL at 13:06

## 2021-02-02 RX ADMIN — OXYCODONE HYDROCHLORIDE AND ACETAMINOPHEN 2 TABLET: 7.5; 325 TABLET ORAL at 18:33

## 2021-02-02 RX ADMIN — PREGABALIN 25 MG: 25 CAPSULE ORAL at 22:40

## 2021-02-02 RX ADMIN — HYDRALAZINE HYDROCHLORIDE 10 MG: 20 INJECTION INTRAMUSCULAR; INTRAVENOUS at 10:17

## 2021-02-02 ASSESSMENT — PAIN SCALES - GENERAL
PAINLEVEL_OUTOF10: 10
PAINLEVEL_OUTOF10: 9
PAINLEVEL_OUTOF10: 10
PAINLEVEL_OUTOF10: 3

## 2021-02-02 NOTE — OP NOTE
[address] [address]                                OPERATIVE REPORT    PATIENT NAME: Joselin Silva                  :        1970  MED REC NO:   [id number]                          ROOM:       2343  ACCOUNT NO:   [de-identified]                           ADMIT DATE: 2021  PROVIDER:     Mamadou Raymundo MD    DATE OF PROCEDURE:  2021    ANGIOGRAM PROCEDURE REPORT    PREOPERATIVE DIAGNOSIS:  Peripheral vascular disease with ischemic  ulcerations to the right foot. POSTOPERATIVE DIAGNOSIS:  Peripheral vascular disease with ischemic  ulcerations to the right foot. PROCEDURES PERFORMED:  1. Ultrasound-guided left femoral artery access. 2.  Distal abdominal aortogram with bilateral iliofemoral angiograms. 3.  Right lower extremity angiogram.  4.  Angioplasty and stenting of the distal right external iliac artery. 5.  Angioplasty of mid SFA stenosis. 6.  Angioplasty of popliteal artery stenosis. 7.  Angioplasty and atherectomy of the proximal peroneal artery. 8.  Angioplasty of the anterior tibial artery. SURGEON:  Mamadou Raymundo MD    ANESTHESIA:  Local with moderate monitored sedation. INDICATIONS:  The patient is a 49-year-old male with history of vascular  disease. He has ischemic lesions on the first and fifth toe of the  right foot. Noninvasive studies had been performed showing multilevel  disease. The patient is brought to the angio suite at this time to  undergo angiography with possible intervention. OPERATIVE PROCEDURE:  The patient was brought to the angio suite, placed  in a supine position. Under my direct supervision, Versed and fentanyl  were administered for moderate sedation. The patient was monitored by  an independent trained nurse observer using continuous blood pressure,  EKG and pulse oximetry.   I spent 160 minutes face-to-face with the  patient providing and directing sedation. The groins were prepped and  draped in a sterile fashion. Ultrasound was used to identify the left common femoral bifurcation. The common femoral artery was patent and pulsatile. Under direct  visualization, this was accessed and a 6-Austrian introducer sheath was  placed. A digital copy of the ultrasound image was saved in the patients record. A Bentson wire was advanced into the abdominal aorta and a  modified hook catheter was advanced over the wire. The catheter was  placed in the mid abdominal aorta and distal abdominal aortogram was  obtained. The catheter was then used to cannulate the right common  iliac artery. The patient was given 5000 units of intravenous heparin. A Bentson wire  was then carefully advanced using a roadmapping technique across the  stenosis in the distal external iliac artery. The short 6-Austrian sheath  was removed and replaced with a 6-Austrian Destination sheath which was  advanced into the distal common iliac artery. Angioplasty was then  performed of the distal external iliac artery and stenting was then  performed using a 7 x 40 mm self-expanding EverFlex stent. Post  stenting, I angioplastied a more proximal previously placed external  iliac stent and had evidence of in-stent stenosis. This was done using  a 6 x 40 mm AngioSculpt balloon. Right lower extremity angiograms were then performed. There was a  lesion in the mid SFA which was angioplastied using a 5 x 40 mm  AngioSculpt balloon and a popliteal lesion which was angioplastied using  a 4 x 20 AngioSculpt balloon. These two lesions were then further  treated with drug-coated balloons, the SFA lesion with a 5 x 40 balloon  and then the popliteal lesion with a 4 x 40 balloon. Angiograms below  the knee demonstrated irregularities and occlusion of the proximal  peroneal artery. Angioplasty was performed after placing a ChoICE PT  wire across the lesions.   I used a 2.5 x 120 balloon to angioplasty the  lesion. This resulted in good result except for at the origin of the  peroneal artery. This was resistant to angioplasty. Therefore I used a  1.4 mm laser fiber to laser the proximal peroneal artery. I then used a  3 x 20 angioplasty balloon, achieving excellent result. The origin of the anterior tibial artery was patent; however, it did  appear to be occluded for the most part distally. I passed the ChoICE  PT wire using a Van Quirk catheter for support down the length of the  anterior tibial artery and then attempted angioplasty. I was able to  angioplasty with a 1.5-mm balloon sequentially down to just above the  ankle; however, I could not get this to cross distally even though the  wire crossed. With the finding of excellent flow through the peroneal  artery filling collaterals into the foot, I felt that it was unlikely to  achieve a better result and the possibility of damaging the collaterals  was present. Therefore further attempts to angioplasty the anterior  tibial artery were abandoned. The wires and catheters were removed. The long sheath was removed over  a Help/Systems wire, replaced with a short 6-Mauritanian introducer sheath. Retrograde femoral angiogram was performed and a 6-Mauritanian Mynx closure  device was then placed and deployed in standard fashion achieving  excellent hemostasis. Estimated blood loss throughout the procedure was minimal.    At the end of procedure, the patient was transferred to the recovery  area in a stable condition. ANGIOGRAPHIC FINDINGS:  The abdominal aorta is patent. The bilateral  common iliac artery stents are patent. There is a right external iliac  artery stent that has approximately 50% to 60% in-stent stenosis. There  is a high-grade stenosis in the external iliac artery just beyond the  stented region.   The common femoral, deep femoral and superficial  femoral arteries are patent with several lesions in the superficial  femoral artery and popliteal artery creating significant stenoses. The  below-knee popliteal artery is patent. The origin of the anterior  tibial artery is patent, then occludes. Peroneal artery is visualized,  but is diseased proximally. The posterior tibial artery is not  visualized proximally, but fills by collaterals at the ankle. Post  interventions, the SFA and popliteal are widely patent. There is  excellent flow through the peroneal artery down to the ankle giving rise  to collaterals filling into the foot.         Bacilio Watters MD    D: 02/02/2021 13:45:42       T: 02/02/2021 13:51:51     [id number]  Job#: [id number]     Doc#: [id number]    CC:

## 2021-02-02 NOTE — CARE COORDINATION
Critical access hospital    Active with [facility] Dc Poon RN, BSN CTN  [facility] [phone]

## 2021-02-02 NOTE — PROGRESS NOTES
02/02/21 0334   NIV Type   $NIV $Daily Charge   NIV Started/Stopped On   Equipment Type v60   Mode CPAP   Mask Type Full face mask   Mask Size Medium   Settings/Measurements   CPAP/EPAP 8 cmH2O   Resp 18   FiO2  21 %   Vt Exhaled 910 mL   Mask Leak (lpm) 24 lpm   Comfort Level Good   Using Accessory Muscles No   Breath Sounds   Right Upper Lobe Diminished   Right Middle Lobe Diminished   Right Lower Lobe Diminished   Left Upper Lobe Diminished   Left Lower Lobe Diminished   Alarm Settings   Alarms On Y   Press Low Alarm 6 cmH2O   High Pressure Alarm 20 cmH2O   Resp Rate Low Alarm 6   High Respiratory Rate 40 br/min

## 2021-02-02 NOTE — PROGRESS NOTES
Vascular    Angiogram with:    Stenting distal External Iliac  PTA SFA  PTA Pop  PTA/Aterhectomy proximal Peroneal  PTA YAHIR.   Note dictated

## 2021-02-02 NOTE — PROGRESS NOTES
Pt belonging brought to Cath lab floor room 3. 1 bag of belongings already present in room. 4 additional bags brought for total of 5 bags. 1 bad given to nurse at nurses station in cath lab with pts cigeretts, lighter, and medications in it. Repository requested Cpap to be left in B3 hallway for them to move to C4 once pt is assigned a room.  Note left on Cpap per their request.

## 2021-02-02 NOTE — PROGRESS NOTES
Perfect serve sent to [facility] Lo to request different IV pain medication as patient is allergic to morphine.

## 2021-02-02 NOTE — PROGRESS NOTES
patch Transdermal Daily    heparin (porcine)  5,000 Units Subcutaneous 3 times per day    insulin lispro  0-12 Units Subcutaneous TID WC    insulin lispro  0-6 Units Subcutaneous Nightly    cefepime  1,000 mg Intravenous Q24H    vancomycin (VANCOCIN) intermittent dosing (placeholder)   Other RX Placeholder    b complex-C-folic acid  1 capsule Oral Daily     PRN Meds: morphine, simethicone, oxyCODONE-acetaminophen, heparin (porcine), albuterol, nitroGLYCERIN, glucose, dextrose, glucagon (rDNA), dextrose, sodium chloride flush, promethazine **OR** ondansetron, polyethylene glycol, acetaminophen **OR** acetaminophen      Intake/Output Summary (Last 24 hours) at 2/2/2021 1421  Last data filed at 2/1/2021 1436  Gross per 24 hour   Intake 240 ml   Output --   Net 240 ml       Physical Exam Performed:    BP (!) 141/69   Pulse 86   Temp 97.6 °F (36.4 °C) (Oral)   Resp 18   Ht 5' 9\" (1.753 m)   Wt 257 lb 0.9 oz (116.6 kg)   SpO2 96%   BMI 37.96 kg/m²     General appearance: No apparent distress, appears stated age and cooperative. HEENT: Pupils equal, round, and reactive to light. Conjunctivae/corneas clear. Neck: Supple, with full range of motion. No jugular venous distention. Trachea midline. Respiratory:  Normal respiratory effort. Clear to auscultation, bilaterally without Rales/Wheezes/Rhonchi. Cardiovascular: Regular rate and rhythm with normal S1/S2 without murmurs, rubs or gallops. Abdomen: Soft, non-tender, non-distended with normal bowel sounds. Musculoskeletal: No clubbing, cyanosis or edema bilaterally. Full range of motion without deformity  Redness and tenderness on right lower extremity toes and dorsum of the foot this is improved from my exam of yesterday.   no visible drainage.    Noted a dry scabbed wound on the dorsal surface of distal ankle on the right lower extremity. .  Skin: Skin color, texture, turgor normal.  No rashes or lesions.   Neurologic:  Neurovascularly intact without any focal sensory/motor deficits. Cranial nerves: II-XII intact, grossly non-focal.  Psychiatric: Alert and oriented, thought content appropriate, normal insight  Capillary Refill: Brisk,< 3 seconds   Peripheral Pulses: +2 palpable, equal bilaterally          Labs:   Recent Labs     01/31/21  0814 02/01/21  0717   WBC 9.3 8.4   HGB 10.2* 9.8*   HCT 30.2* 29.6*    238     Recent Labs     01/31/21  0814 02/01/21  0717    132*   K 3.5 4.1   CL 99 95*   CO2 24 24   BUN 37* 47*   CREATININE 4.5* 5.4*   CALCIUM 8.6 8.4     No results for input(s): AST, ALT, BILIDIR, BILITOT, ALKPHOS in the last 72 hours. No results for input(s): INR in the last 72 hours. No results for input(s): [place] in the last 72 hours. Urinalysis:      Lab Results   Component Value Date    NITRU Negative 06/05/2020    WBCUA 10-20 06/05/2020    BACTERIA 2+ 06/05/2020    RBCUA 0-2 06/05/2020    BLOODU TRACE-INTACT 06/05/2020    SPECGRAV 1.020 06/05/2020    GLUCOSEU 250 06/05/2020       Radiology:  XR FOOT RIGHT (MIN 3 VIEWS)   Final Result   No acute osseous abnormality. Assessment/Plan:    Active Hospital Problems    Diagnosis    Iliac artery occlusion, right ([facility] [address] [I74.5]    Cellulitis of right foot [W86.829]       Right lower extremity cellulitis  Started about a week ago.  Got progressively worse. Physical and laboratory findings are not consistent with sepsis.   Continued on empiric IV antibiotics. He is receiving cefepime and vancomycin and will follow closely. Foot is much less red and seems to be improving. Plain x-ray of the lower extremity is not showing any signs of underlying bone infection.   podiatry is consulted and input is appreciated.   He did undergo bedside debridement of right hallux ulcer on 1/30/2021 with podiatry.  [place] follow closely  Vascular surgery consulted for further input.   -angiogram done 2/2 (stent of distal external Illiac, PTA of SFA/Pop/YAHIR, PTA/atherectomy of prox Peroneal)   -added low dose iv morphine prn 2/1(however apparently allergic ,was requesting dilaudid)    Diabetes mellitus type 2 with hyperglycemia  Continued basal bolus insulin protocol with Lantus and sliding scale insulin     End-stage renal disease on hemodialysis  Continue hemodialysis 3 times weekly, nephrology also consulted and following        Elevated troponin  Consistent with poor clearance associated with end-stage renal disease.  In line with troponin levels obtained during past year. Clinically stable, no symptoms.     Tobacco abuse  Unfortunately, patient is still an active smoker and is not interested in quitting smoking.   Nicotine patch will be provided as inpatient.     Peripheral diabetic neuropathy  Continue Lyrica at home dose     Dyslipidemia  Continue statin     DVT Prophylaxis: Heparin  Diet: Diet NPO, After Midnight  Code Status: Full Code    PT/OT Eval Status: not ordered yet, defer to surgery on this    Dispo - pending recovery, pending pt/ot eval timing per surgery     Sallie Correa MD

## 2021-02-02 NOTE — PROGRESS NOTES
02/01/21 2339   NIV Type   NIV Started/Stopped On   Equipment Type v60   Mode CPAP   Mask Type Full face mask   Mask Size Medium   Settings/Measurements   CPAP/EPAP 8 cmH2O   Resp 17   FiO2  21 %   Vt Exhaled 689 mL   Mask Leak (lpm) 10 lpm   Comfort Level Good   Using Accessory Muscles No   SpO2 96   Breath Sounds   Right Upper Lobe Diminished   Right Middle Lobe Diminished   Right Lower Lobe Diminished   Left Upper Lobe Diminished   Left Lower Lobe Diminished   Alarm Settings   Alarms On Y   Press Low Alarm 6 cmH2O   High Pressure Alarm 20 cmH2O   Resp Rate Low Alarm 6   High Respiratory Rate 40 br/min

## 2021-02-03 VITALS
HEART RATE: 107 BPM | HEIGHT: 69 IN | TEMPERATURE: 98 F | BODY MASS INDEX: 38.3 KG/M2 | OXYGEN SATURATION: 91 % | WEIGHT: 258.6 LBS | RESPIRATION RATE: 14 BRPM | DIASTOLIC BLOOD PRESSURE: 67 MMHG | SYSTOLIC BLOOD PRESSURE: 149 MMHG

## 2021-02-03 LAB
ANION GAP SERPL CALCULATED.3IONS-SCNC: 12 MMOL/L (ref 3–16)
BASOPHILS ABSOLUTE: 0 K/UL (ref 0–0.2)
BASOPHILS RELATIVE PERCENT: 0.2 %
BUN BLDV-MCNC: 41 MG/DL (ref 7–20)
CALCIUM SERPL-MCNC: 8.9 MG/DL (ref 8.3–10.6)
CHLORIDE BLD-SCNC: 89 MMOL/L (ref 99–110)
CO2: 27 MMOL/L (ref 21–32)
CREAT SERPL-MCNC: 5.7 MG/DL (ref 0.9–1.3)
EOSINOPHILS ABSOLUTE: 0.2 K/UL (ref 0–0.6)
EOSINOPHILS RELATIVE PERCENT: 1.2 %
GFR AFRICAN AMERICAN: 13
GFR NON-AFRICAN AMERICAN: 10
GLUCOSE BLD-MCNC: 57 MG/DL (ref 70–99)
GLUCOSE BLD-MCNC: 58 MG/DL (ref 70–99)
GLUCOSE BLD-MCNC: 66 MG/DL (ref 70–99)
GLUCOSE BLD-MCNC: 68 MG/DL (ref 70–99)
HCT VFR BLD CALC: 30.2 % (ref 40.5–52.5)
HEMOGLOBIN: 9.9 G/DL (ref 13.5–17.5)
LYMPHOCYTES ABSOLUTE: 1 K/UL (ref 1–5.1)
LYMPHOCYTES RELATIVE PERCENT: 7.2 %
MCH RBC QN AUTO: 28.9 PG (ref 26–34)
MCHC RBC AUTO-ENTMCNC: 32.8 G/DL (ref 31–36)
MCV RBC AUTO: 88 FL (ref 80–100)
MONOCYTES ABSOLUTE: 0.8 K/UL (ref 0–1.3)
MONOCYTES RELATIVE PERCENT: 5.8 %
NEUTROPHILS ABSOLUTE: 12.2 K/UL (ref 1.7–7.7)
NEUTROPHILS RELATIVE PERCENT: 85.6 %
PDW BLD-RTO: 14.6 % (ref 12.4–15.4)
PERFORMED ON: ABNORMAL
PLATELET # BLD: 260 K/UL (ref 135–450)
PMV BLD AUTO: 7.9 FL (ref 5–10.5)
POC ACT LR: 161 SEC
POC ACT LR: 197 SEC
POTASSIUM REFLEX MAGNESIUM: 3.7 MMOL/L (ref 3.5–5.1)
RBC # BLD: 3.44 M/UL (ref 4.2–5.9)
SODIUM BLD-SCNC: 128 MMOL/L (ref 136–145)
VANCOMYCIN RANDOM: 15.4 UG/ML
WBC # BLD: 14.2 K/UL (ref 4–11)

## 2021-02-03 PROCEDURE — 6360000002 HC RX W HCPCS: Performed by: INTERNAL MEDICINE

## 2021-02-03 PROCEDURE — 80202 ASSAY OF VANCOMYCIN: CPT

## 2021-02-03 PROCEDURE — 6370000000 HC RX 637 (ALT 250 FOR IP): Performed by: INTERNAL MEDICINE

## 2021-02-03 PROCEDURE — 36415 COLL VENOUS BLD VENIPUNCTURE: CPT

## 2021-02-03 PROCEDURE — 2500000003 HC RX 250 WO HCPCS: Performed by: INTERNAL MEDICINE

## 2021-02-03 PROCEDURE — 2580000003 HC RX 258: Performed by: INTERNAL MEDICINE

## 2021-02-03 PROCEDURE — 2700000000 HC OXYGEN THERAPY PER DAY

## 2021-02-03 PROCEDURE — 80048 BASIC METABOLIC PNL TOTAL CA: CPT

## 2021-02-03 PROCEDURE — 94761 N-INVAS EAR/PLS OXIMETRY MLT: CPT

## 2021-02-03 PROCEDURE — 90935 HEMODIALYSIS ONE EVALUATION: CPT

## 2021-02-03 PROCEDURE — 85025 COMPLETE CBC W/AUTO DIFF WBC: CPT

## 2021-02-03 PROCEDURE — 94660 CPAP INITIATION&MGMT: CPT

## 2021-02-03 PROCEDURE — 6370000000 HC RX 637 (ALT 250 FOR IP): Performed by: NURSE PRACTITIONER

## 2021-02-03 RX ORDER — SACCHAROMYCES BOULARDII 250 MG
250 CAPSULE ORAL 2 TIMES DAILY
Qty: 14 CAPSULE | Refills: 0 | Status: SHIPPED | OUTPATIENT
Start: 2021-02-03 | End: 2021-02-10

## 2021-02-03 RX ORDER — CEPHALEXIN 250 MG/1
250 CAPSULE ORAL EVERY 12 HOURS
Status: DISCONTINUED | OUTPATIENT
Start: 2021-02-04 | End: 2021-02-03 | Stop reason: HOSPADM

## 2021-02-03 RX ORDER — CEPHALEXIN 250 MG/1
250 CAPSULE ORAL EVERY 12 HOURS
Qty: 10 CAPSULE | Refills: 0 | Status: SHIPPED | OUTPATIENT
Start: 2021-02-04 | End: 2021-02-09

## 2021-02-03 RX ORDER — SIMETHICONE 80 MG
80 TABLET,CHEWABLE ORAL EVERY 6 HOURS PRN
Qty: 15 TABLET | Refills: 0 | Status: SHIPPED | OUTPATIENT
Start: 2021-02-03 | End: 2021-10-26

## 2021-02-03 RX ADMIN — NEPHROCAP 1 MG: 1 CAP ORAL at 08:11

## 2021-02-03 RX ADMIN — OXYCODONE HYDROCHLORIDE AND ACETAMINOPHEN 2 TABLET: 7.5; 325 TABLET ORAL at 08:11

## 2021-02-03 RX ADMIN — CLOPIDOGREL BISULFATE 75 MG: 75 TABLET ORAL at 08:12

## 2021-02-03 RX ADMIN — SIMETHICONE 80 MG: 80 TABLET, CHEWABLE ORAL at 02:44

## 2021-02-03 RX ADMIN — PREGABALIN 25 MG: 25 CAPSULE ORAL at 13:17

## 2021-02-03 RX ADMIN — OXYCODONE HYDROCHLORIDE AND ACETAMINOPHEN 2 TABLET: 7.5; 325 TABLET ORAL at 02:41

## 2021-02-03 RX ADMIN — TORSEMIDE 100 MG: 100 TABLET ORAL at 08:11

## 2021-02-03 RX ADMIN — DULOXETINE HYDROCHLORIDE 30 MG: 30 CAPSULE, DELAYED RELEASE ORAL at 08:12

## 2021-02-03 RX ADMIN — PANTOPRAZOLE SODIUM 40 MG: 40 TABLET, DELAYED RELEASE ORAL at 08:12

## 2021-02-03 RX ADMIN — DEXTROSE 15 G: 15 GEL ORAL at 08:40

## 2021-02-03 RX ADMIN — HYDRALAZINE HYDROCHLORIDE 50 MG: 50 TABLET, FILM COATED ORAL at 13:17

## 2021-02-03 RX ADMIN — PREGABALIN 25 MG: 25 CAPSULE ORAL at 08:11

## 2021-02-03 RX ADMIN — ISOSORBIDE MONONITRATE 30 MG: 30 TABLET, EXTENDED RELEASE ORAL at 08:11

## 2021-02-03 RX ADMIN — ASPIRIN 81 MG: 81 TABLET, CHEWABLE ORAL at 08:12

## 2021-02-03 RX ADMIN — ONDANSETRON 4 MG: 2 INJECTION INTRAMUSCULAR; INTRAVENOUS at 08:59

## 2021-02-03 RX ADMIN — Medication 10 ML: at 08:13

## 2021-02-03 RX ADMIN — CITALOPRAM HYDROBROMIDE 40 MG: 20 TABLET ORAL at 08:12

## 2021-02-03 RX ADMIN — HYDRALAZINE HYDROCHLORIDE 50 MG: 50 TABLET, FILM COATED ORAL at 05:57

## 2021-02-03 RX ADMIN — LOSARTAN POTASSIUM 50 MG: 25 TABLET, FILM COATED ORAL at 08:12

## 2021-02-03 RX ADMIN — HEPARIN SODIUM 5000 UNITS: 5000 INJECTION INTRAVENOUS; SUBCUTANEOUS at 05:58

## 2021-02-03 RX ADMIN — PROMETHAZINE HYDROCHLORIDE 12.5 MG: 25 TABLET ORAL at 13:17

## 2021-02-03 RX ADMIN — HEPARIN SODIUM 5000 UNITS: 5000 INJECTION INTRAVENOUS; SUBCUTANEOUS at 13:17

## 2021-02-03 RX ADMIN — CALCIUM ACETATE 667 MG: 667 CAPSULE ORAL at 08:12

## 2021-02-03 ASSESSMENT — PAIN SCALES - GENERAL
PAINLEVEL_OUTOF10: 8
PAINLEVEL_OUTOF10: 3

## 2021-02-03 NOTE — PROGRESS NOTES
02/03/21 0345   NIV Type   Equipment Type v60   Mode AVAPS   Mask Type Full face mask   Mask Size Medium   Settings/Measurements   CPAP/EPAP 8 cmH2O   Resp 14   FiO2  30 %   Vt Exhaled 757 mL   Mask Leak (lpm) 0 lpm   Comfort Level Good   Using Accessory Muscles No   SpO2 94   Alarm Settings   Alarms On Y   Press Low Alarm 6 cmH2O   High Pressure Alarm 25 cmH2O   Apnea (secs) 20 secs   Resp Rate Low Alarm 6   High Respiratory Rate 40 br/min

## 2021-02-03 NOTE — FLOWSHEET NOTE
Treatment time: 3.5 hours  Net UF: 1992 ml     Pre weight: 119.6 kg   Post weight: 117.3 kg  EDW: 117 kg     Access used: LCW CVC  Access function: Good with  ml/min     Medications or blood products given: None     Regular outpatient schedule: [facility]     Summary of response to treatment: Tolerated tx fair. Tx ended early because patient began to have nausea and vomiting.  Issues resolved when blood returned.      Copy of dialysis treatment record placed in chart, to be scanned into EMR.       02/03/21 0908 02/03/21 1225   Treatment   Time On  --  0915   Time Off  --  1218   Vital Signs   BP (!) 176/74 (!) 165/70   Temp 98 °F (36.7 °C) 97.6 °F (36.4 °C)   Pulse 94 118   Resp 18 18   Dialysis Bath   K+ (Potassium) 4  --    Ca+ (Calcium) 2.25  --    Na+ (Sodium) 138  --    HCO3 (Bicarb) 32  --

## 2021-02-03 NOTE — PROGRESS NOTES
Podiatry Progress Note    Subjective:   Patient was seen at bedside this evening. Some pain at his right lower extremity following angiogram today. He denied n/v/f/c and SOB. Objective:   Vitals  BP (!) 176/76   Pulse 105   Temp 98 °F (36.7 °C) (Oral)   Resp 18   Ht 5' 9\" (1.753 m)   Wt 257 lb 0.9 oz (116.6 kg)   SpO2 92%   BMI 37.96 kg/m²   Integument:  Eschar seen at the anterior right and left ankle as well as the medial right hallux and dorsal right fifth toe. Minimal erythema. No active drainage. No proximal red streaking. No fluctuance. No evidence of deep space abscess. Nails: The medial border of the right hallux toe nail is ingrown. Neurologic:  Protective sensation is grossly diminished to light touch at the level of the toes, bilateral.  Vascular:  DP and PT pulses are weakly palpable, bilateral.  CFT is less than five seconds at all toes. Mild edema at both feet  Musculoskeletal:  Patient has 5/5 strength on inversion/everison/dorsiflexion/plantarflexion, bilateral.  No gross musculoskeletal deformities noted. Pain on palpation at the medial border of the right hallux toe nail.      Labs:   CBC with Differential:    Lab Results   Component Value Date    WBC 8.4 02/01/2021    RBC 3.35 02/01/2021    HGB 9.8 02/01/2021    HCT 29.6 02/01/2021     02/01/2021    MCV 88.3 02/01/2021    MCH 29.2 02/01/2021    MCHC 33.1 02/01/2021    RDW 14.4 02/01/2021    SEGSPCT 73.4 05/17/2013    BANDSPCT 5 12/21/2020    METASPCT 1 12/21/2020    LYMPHOPCT 12.6 01/30/2021    MONOPCT 6.9 01/30/2021    MYELOPCT 0 12/13/2020    BASOPCT 0.8 01/30/2021    MONOSABS 0.5 01/30/2021    LYMPHSABS 1.0 01/30/2021    EOSABS 0.3 01/30/2021    BASOSABS 0.1 01/30/2021    DIFFTYPE Auto 05/17/2013     CMP:    Lab Results   Component Value Date     02/01/2021    K 4.1 02/01/2021    K 3.7 01/30/2021    CL 95 02/01/2021    CO2 24 02/01/2021    BUN 47 02/01/2021    CREATININE 5.4 02/01/2021    GFRAA 14 02/01/2021 GFRAA >60 05/17/2013    AGRATIO 1.2 01/30/2021    LABGLOM 11 02/01/2021    GLUCOSE 85 02/01/2021    PROT 6.5 01/30/2021    PROT 6.7 12/27/2012    LABALBU 3.6 01/30/2021    CALCIUM 8.4 02/01/2021    BILITOT <0.2 01/30/2021    ALKPHOS 84 01/30/2021    AST 10 01/30/2021    ALT 10 01/30/2021     PT/INR:    Lab Results   Component Value Date    PROTIME 11.6 12/07/2020    INR 1.00 12/07/2020     Last 3 Troponin:    Lab Results   Component Value Date    TROPONINI 0.06 01/29/2021    TROPONINI 0.06 01/29/2021    TROPONINI 0.07 01/29/2021     HgBA1c:    Lab Results   Component Value Date    LABA1C 7.9 01/29/2021     CRP 1/29/2021: 26.0     ESR 1/29/2021: 78    Imaging: Three Views, Right Foot 1/29/2021:  No acute osseous abnormality. Vascular:   Arterial Duplex 12/30/2020:  Summary        Absent right DESTINEY suggestive of significant ischemia.    Normal left DESTINEY.        Proximal right leg arterial waveforms are suggestive of significant inflow    disease localized to the iliac arteries without significant right    femoropopliteal disease. Anterior tibial artery is patent as the single    runoff vessel to the right foot.        No evidence of significant infrainguinal arterial disease of the L leg. Assessment/Plan:   The patient is a pleasant 44year old gentleman with:  1) Ulcer to fat, right lower extremity with cellulitis  - Antibiotics per the Primary Team (Vanc and Cefepime). Okay to switch to PO from my standpoint.  - Betadine to wound sites  - Will continue to follow  - Okay for D/C from a Podiatry standpoint. D/C instructions are in the EMR.   2) DM2 with PVD  - S/P ANGIO  3) ESRD  4) Tobacco abuse      Letty Pennington DPM, JENNIFER, [facility]  Office: [phone]  Cell: [phone]

## 2021-02-03 NOTE — CARE COORDINATION
CASE MANAGEMENT INITIAL ASSESSMENT      Reviewed chart and completed assessment with: patient   Explained Case Management role/services. Health Care Decision Maker :   Primary Decision Maker: Crystal Ann - Spouse - [phone]          Can this person be reached and be able to respond quickly, such as within a few minutes or hours? Yes    Admit date/status: [place]   Diagnosis: cellulitis    Is this a Readmission?:  Yes      Insurance: Orval Guardian required for SNF: Yes       3 night stay required: No    Living arrangements, Adls, care needs, prior to admission: lives in a mobile home with his wife      Transportation: patient      Durable Medical Equipment at home:  Walker__Cane__RTS__ BSC__Shower Chair_X_  02__ HHN__ CPAP_X_  BiPap__  Hospital Bed__ W/C___ Other_ramp_________    Services in the home and/or outpatient, prior to admission: active with [address] (if applicable)   · Name:  · Address:  · Dialysis Schedule:  · Phone:  · Fax:    PT/OT recs: none    Hospital Exemption Notification (HEN): not initiated    Barriers to discharge: none    Plan/comments: Patient is independent at home. Will continue to follow. ECOC on chart for MD signature        CASE MANAGEMENT DISCHARGE SUMMARY      Discharge to: home with [facility]    Transportation: wife     Confirmed discharge plan with: patient/wife/RN/Gemma with [facility]      Patient: yes     Family, name and contact number: Alcon De La Fuente [phone]     Facility/Agency, name:  [facility] faxed n/a    Phone number for report to facility: [phone]     RN, name: Odalys Mitchell    Note: Discharging nurse to complete [facility], reconcile [facility], and place final copy with patient's discharge packet.

## 2021-02-03 NOTE — PROGRESS NOTES
 vitamin D  50,000 Units Oral Weekly    sodium chloride flush  10 mL Intravenous 2 times per day    nicotine  1 patch Transdermal Daily    heparin (porcine)  5,000 Units Subcutaneous 3 times per day    insulin lispro  0-12 Units Subcutaneous TID WC    insulin lispro  0-6 Units Subcutaneous Nightly    cefepime  1,000 mg Intravenous Q24H    vancomycin (VANCOCIN) intermittent dosing (placeholder)   Other RX Placeholder    b complex-C-folic acid  1 capsule Oral Daily         Labs:  Recent Labs     02/01/21  0717 02/03/21  0910   WBC 8.4 14.2*   HGB 9.8* 9.9*   HCT 29.6* 30.2*   MCV 88.3 88.0    260     Recent Labs     02/01/21  0717 02/03/21  0910   * 128*   K 4.1 3.7   CL 95* 89*   CO2 24 27   GLUCOSE 85 68*   BUN 47* 41*   CREATININE 5.4* 5.7*   LABGLOM 11* 10*   GFRAA 14* 13*       Assessment  -ESRD on HD MWF  -Right foot cellulitis on abx               podiatry and vascular following    S/P RLE angioplasty  -Anemia  -CKD-MBD     Plan  -HD per MWF schedule  -iv abx per primary team  -Serial renal panel  -daily wts and strict i/o  -renal dose medications   -avoid nephrotoxins    Please do not hesitate to contact me at [phone] if with questions. Thank you!     Rodrigo Zapata MD  2/3/2021  [facility]

## 2021-02-03 NOTE — CARE COORDINATION
[facility]    DC order noted, all docs needed have been faxed to [facility] for home care services.     Home care to see patient within 24-48 hrs    Hans Root RN, BSN CTN  [facility] [phone]

## 2021-02-03 NOTE — PROGRESS NOTES
02/03/21 0010   NIV Type   $NIV $Daily Charge   Equipment Type v60   Mode CPAP   Mask Type Full face mask   Mask Size Medium   Settings/Measurements   CPAP/EPAP 8 cmH2O   Resp 15   FiO2  30 %   Vt Exhaled 545 mL   Mask Leak (lpm) 1 lpm   Comfort Level Good   Using Accessory Muscles No   SpO2 94   Alarm Settings   Alarms On Y   Press Low Alarm 6 cmH2O   High Pressure Alarm 25 cmH2O   Apnea (secs) 20 secs   Resp Rate Low Alarm 6   High Respiratory Rate 40 br/min

## 2021-02-03 NOTE — PROGRESS NOTES
Vascular    S/P Angiogram  Reports less R Foot pain    Left groin access site without hematoma  Doppler peroneal, DP and PT R  R foot warm    Continue ASA and Plavix  F/u prn  Call if further issue

## 2021-02-03 NOTE — PROGRESS NOTES
02/02/21 2336   NIV Type   Skin Assessment Clean, dry, & intact   Skin Protection for O2 Device No  (pt refused mepilex)   NIV Started/Stopped On   Equipment Type v60   Mode CPAP   Mask Type Full face mask   Mask Size Medium   Settings/Measurements   CPAP/EPAP 8 cmH2O   Resp 15   FiO2  30 %   Vt Exhaled 522 mL   Minute Volume 8.6 Liters   Mask Leak (lpm) 0 lpm   Comfort Level Good   Using Accessory Muscles No   SpO2 93   Alarm Settings   Alarms On Y   Press Low Alarm 6 cmH2O   High Pressure Alarm 25 cmH2O   Apnea (secs) 20 secs   Resp Rate Low Alarm 6   High Respiratory Rate 40 br/min

## 2021-02-04 ENCOUNTER — CARE COORDINATION (OUTPATIENT)
Dept: CASE MANAGEMENT | Age: 51
End: 2021-02-04

## 2021-02-04 NOTE — CARE COORDINATION
[facility] Transitions Follow Up Call    2021    Patient: Jordyn Hager  Patient : 1970   MRN: [id number]  Reason for Admission: readmit cellulitis right foot  Discharge Date: 2/3/21 RARS: Readmission Risk Score: 61         Spoke with: Jordyn Hager    Patient answered call and verified . Patient pleasant and brief with conversation. Patient was eating lunch, but agreeable to call. Patient states that he went back to hospital after swelling and redness developed in his right foot. Patient confirmed that he picked up new prescriptions and taking as directed. Denied to complete full medication reconciliation with CTN and states that he is aware of all of his medications. Patient confirms that he is active with [facility] and continues to have dialysis on MWF at [facility]. Denies any acute needs at present time. Agreeable to f/u calls. Educated on the use of urgent care or physicians 24 hr access line if assistance is needed after hours. Care Transitions Subsequent and Final Call    Subsequent and Final Calls  Do you have any ongoing symptoms?: No  Have your medications changed?: Yes  Do you have any questions related to your medications?: No  Do you currently have any active services?: Yes  Are you currently active with any services?: Home Health  Do you have any needs or concerns that I can assist you with?: No  Identified Barriers: None  Care Transitions Interventions  Other Interventions:            Follow Up  Future Appointments   Date Time Provider eNlly Darby   2021  1:15 PM JAY Garcia CNP   2021  9:00 AM JAY Jennings CNP AND ROBBI Smith RN

## 2021-02-11 ENCOUNTER — CARE COORDINATION (OUTPATIENT)
Dept: CASE MANAGEMENT | Age: 51
End: 2021-02-11

## 2021-02-11 NOTE — CARE COORDINATION
[facility] Transitions Follow Up Call    2021    Patient: Pramod Melton  Patient : 1970   MRN: [id number]  Reason for Admission: cellulitis  Discharge Date: 2/3/21 RARS: Readmission Risk Score: 63         Attempted to reach patient via phone for transition call. VM left stating purpose of call along with my contact information requesting a return call. Care Transitions Subsequent and Final Call    Subsequent and Final Calls  Care Transitions Interventions  Other Interventions:            Follow Up  Future Appointments   Date Time Provider Nelly Darby   2021  1:15 PM Yara Flurry, APRN - CNP [facility]   2021  9:00 AM JAY Donovan CNP AND ROBBI Lorenzo RN

## 2021-02-18 ENCOUNTER — CARE COORDINATION (OUTPATIENT)
Dept: CASE MANAGEMENT | Age: 51
End: 2021-02-18

## 2021-02-25 ENCOUNTER — OFFICE VISIT (OUTPATIENT)
Dept: FAMILY MEDICINE CLINIC | Age: 51
End: 2021-02-25
Payer: COMMERCIAL

## 2021-02-25 ENCOUNTER — CARE COORDINATION (OUTPATIENT)
Dept: CASE MANAGEMENT | Age: 51
End: 2021-02-25

## 2021-02-25 VITALS
DIASTOLIC BLOOD PRESSURE: 64 MMHG | SYSTOLIC BLOOD PRESSURE: 128 MMHG | HEART RATE: 83 BPM | OXYGEN SATURATION: 94 % | WEIGHT: 272.8 LBS | TEMPERATURE: 97.7 F | BODY MASS INDEX: 40.29 KG/M2

## 2021-02-25 DIAGNOSIS — M47.817 LUMBOSACRAL SPONDYLOSIS WITHOUT MYELOPATHY: Chronic | ICD-10-CM

## 2021-02-25 DIAGNOSIS — L97.519 ULCER OF RIGHT FOOT, UNSPECIFIED ULCER STAGE (HCC): ICD-10-CM

## 2021-02-25 DIAGNOSIS — M51.37 DEGENERATION OF LUMBAR OR LUMBOSACRAL INTERVERTEBRAL DISC: Chronic | ICD-10-CM

## 2021-02-25 DIAGNOSIS — N18.4 CKD STAGE 4 DUE TO TYPE 2 DIABETES MELLITUS (HCC): Primary | ICD-10-CM

## 2021-02-25 DIAGNOSIS — E11.22 CKD STAGE 4 DUE TO TYPE 2 DIABETES MELLITUS (HCC): Primary | ICD-10-CM

## 2021-02-25 PROCEDURE — G8427 DOCREV CUR MEDS BY ELIG CLIN: HCPCS | Performed by: NURSE PRACTITIONER

## 2021-02-25 PROCEDURE — 99214 OFFICE O/P EST MOD 30 MIN: CPT | Performed by: NURSE PRACTITIONER

## 2021-02-25 PROCEDURE — 4004F PT TOBACCO SCREEN RCVD TLK: CPT | Performed by: NURSE PRACTITIONER

## 2021-02-25 PROCEDURE — 2022F DILAT RTA XM EVC RTNOPTHY: CPT | Performed by: NURSE PRACTITIONER

## 2021-02-25 PROCEDURE — G8417 CALC BMI ABV UP PARAM F/U: HCPCS | Performed by: NURSE PRACTITIONER

## 2021-02-25 PROCEDURE — 1111F DSCHRG MED/CURRENT MED MERGE: CPT | Performed by: NURSE PRACTITIONER

## 2021-02-25 PROCEDURE — G8482 FLU IMMUNIZE ORDER/ADMIN: HCPCS | Performed by: NURSE PRACTITIONER

## 2021-02-25 PROCEDURE — 3051F HG A1C>EQUAL 7.0%<8.0%: CPT | Performed by: NURSE PRACTITIONER

## 2021-02-25 PROCEDURE — 3017F COLORECTAL CA SCREEN DOC REV: CPT | Performed by: NURSE PRACTITIONER

## 2021-02-25 RX ORDER — SULFAMETHOXAZOLE AND TRIMETHOPRIM 800; 160 MG/1; MG/1
1 TABLET ORAL 2 TIMES DAILY
Qty: 20 TABLET | Refills: 0 | Status: SHIPPED | OUTPATIENT
Start: 2021-02-25 | End: 2021-03-07

## 2021-02-25 RX ORDER — CEPHALEXIN 500 MG/1
500 CAPSULE ORAL 2 TIMES DAILY
Qty: 14 CAPSULE | Refills: 0 | Status: SHIPPED | OUTPATIENT
Start: 2021-02-25 | End: 2021-03-04

## 2021-02-25 RX ORDER — OXYCODONE AND ACETAMINOPHEN 7.5; 325 MG/1; MG/1
1 TABLET ORAL EVERY 4 HOURS PRN
Qty: 28 TABLET | Refills: 0 | Status: SHIPPED | OUTPATIENT
Start: 2021-02-27 | End: 2021-03-06

## 2021-02-25 ASSESSMENT — ENCOUNTER SYMPTOMS
SHORTNESS OF BREATH: 0
COUGH: 0
WHEEZING: 0
RESPIRATORY NEGATIVE: 1
GASTROINTESTINAL NEGATIVE: 1

## 2021-02-25 NOTE — CARE COORDINATION
[facility] Transitions Follow Up Call    2021    Patient: Joaquín Quarles  Patient : 1970   MRN: [id number]  Reason for Admission: cellulitis of right foot  Discharge Date: 2/3/21 RARS: Readmission Risk Score: 63         Attempted to reach patient via phone for transition call. VM left stating purpose of call along with my contact information requesting a return call. Care Transitions Subsequent and Final Call    Subsequent and Final Calls  Care Transitions Interventions  Other Interventions:            Follow Up  Future Appointments   Date Time Provider Nelly Darby   2021  3:30 PM MD clari Cee [facility]   3/2/2021  3:00 PM JAY Fisher CNP [facility]   2021  9:00 AM JAY Blankenship CNP AND ROBBI [facility]       Adolfo Samayoa RN

## 2021-02-25 NOTE — PROGRESS NOTES
Patient: Topher Aguirre is a 44 y.o. male who presents today with the following Chief Complaint(s):  Chief Complaint   Patient presents with    Lesion(s)     on ankles and toes, lasted about a month or two          HPI  Patient presents today with concerns of wounds on his right foot. He states they have both been present for about a month. The sore on his right great toe feels like it throbs at night. He was in the hospital a month ago and he states after wearing the hospital ankle socks he got the sore on his ankle. He states they are painful. He was seeing pain management for his back pain. He was taking a few extra pain pills and he was 7-8 pills short when he was seen at pain management and they dismissed him. He would like a referral to a new pain management. He is also wondering if Dr. Erlinda Barbosa can prescribe him some pain medication until he gets in with a new pain management. Current Outpatient Medications   Medication Sig Dispense Refill    sulfamethoxazole-trimethoprim (BACTRIM DS;SEPTRA DS) 800-160 MG per tablet Take 1 tablet by mouth 2 times daily for 10 days 20 tablet 0    cephALEXin (KEFLEX) 500 MG capsule Take 1 capsule by mouth 2 times daily for 7 days 14 capsule 0    [START ON 2/27/2021] oxyCODONE-acetaminophen (PERCOCET) 7.5-325 MG per tablet Take 1 tablet by mouth every 4 hours as needed for Pain for up to 7 days. 28 tablet 0    simethicone (MYLICON) 80 MG chewable tablet Take 1 tablet by mouth every 6 hours as needed (cramping) 15 tablet 0    glucose monitoring kit (FREESTYLE) monitoring kit 1 kit by Does not apply route daily 1 kit 0    blood glucose test strips (GLUCOSE METER TEST) strip 1 each by In Vitro route 5 times daily As needed. 100 each 3    nitroGLYCERIN (NITROSTAT) 0.4 MG SL tablet DISSOLVE 1 TABLET UNDER THE TONGUE AS NEEDED FOR CHEST PAIN EVERY 5 MINUTES UP TO 3 TIMES.  IF NO RELIEF CALL 911. 25 tablet 10    Insulin Pen Needle 31G X 5 MM MISC 1 each by Does not apply route 4 times daily 300 each 3    hydrOXYzine (VISTARIL) 50 MG capsule TAKE 1 TO 2 CAPSULES BY MOUTH NIGHTLY 60 capsule 5    lidocaine 4 % external patch Place 1 patch onto the skin daily 30 patch 0    hydrALAZINE (APRESOLINE) 50 MG tablet TAKE 1/2 TABLET BY MOUTH EVERY 8 HOURS 90 tablet 2    B Complex-C-Folic Acid (VIRT-CAPS) 1 MG CAPS TK ONE C PO  QD 90 capsule 1    Continuous Blood Gluc Sensor (FREESTYLE STEPHANY 14 DAY SENSOR) MISC 1 each by Does not apply route every 14 days 6 each 3    insulin glargine (BASAGLAR KWIKPEN) 100 UNIT/ML injection pen Inject 70 Units into the skin nightly 15 mL 5    traZODone (DESYREL) 150 MG tablet TAKE (1) TABLET BY MOUTH NIGHTLY 30 tablet 10    citalopram (CELEXA) 40 MG tablet TAKE (1) TABLET BY MOUTH DAILY 30 tablet 10    insulin aspart (NOVOLOG FLEXPEN) 100 UNIT/ML injection pen Inject 20 Units into the skin 3 times daily (before meals) 15 pen 5    clopidogrel (PLAVIX) 75 MG tablet TAKE 1 TABLET BY MOUTH ONCE DAILY 90 tablet 3    pravastatin (PRAVACHOL) 80 MG tablet TAKE (1) TABLET BY MOUTH ONCE DAILY 90 tablet 3    QUEtiapine (SEROQUEL) 50 MG tablet TAKE 1 TABLET BY MOUTH EVERY EVENING 30 tablet 5    isosorbide mononitrate (IMDUR) 30 MG extended release tablet TAKE 1 TABLET BY MOUTH EVERY DAY 90 tablet 10    torsemide (DEMADEX) 100 MG tablet Take 1-2 tablets by mouth daily 180 tablet 3    ondansetron (ZOFRAN ODT) 4 MG disintegrating tablet Take 1 tablet by mouth every 8 hours as needed for Nausea 60 tablet 0    LINZESS 145 MCG capsule TAKE 1 CAPSULE BY MOUTH EVERY MORNING BEFORE BREAKFAST 30 capsule 10    Calcium Acetate, Phos Binder, 667 MG CAPS TAKE 1 CAPSULE BY MOUTH THREE TIMES DAILY WITH MEALS 90 capsule 3    tiZANidine (ZANAFLEX) 4 MG tablet TAKE 1 TABLET BY MOUTH THREE TIMES DAILY 90 tablet 10    DULoxetine (CYMBALTA) 30 MG extended release capsule TAKE 1 CAPSULE BY MOUTH EVERY DAY 90 capsule 10    nystatin-triamcinolone (MYCOLOG II) 996170-9.6 current facility-administered medications for this visit. Patient's past medical history, surgical history, family history, medications,and allergies  were all reviewed and updated as appropriate today. Review of Systems   Constitutional: Negative. HENT: Negative. Respiratory: Negative. Negative for cough, shortness of breath and wheezing. Cardiovascular: Negative. Gastrointestinal: Negative. Musculoskeletal: Negative. Skin: Positive for wound. Neurological: Negative. Psychiatric/Behavioral: Negative. Physical Exam  Vitals signs reviewed. Musculoskeletal:        Feet:    Neurological:      Mental Status: He is alert. Vitals:    02/25/21 1534   BP: 128/64   Pulse: 83   Temp: 97.7 °F (36.5 °C)   SpO2: 94%       Assessment:  Encounter Diagnoses   Name Primary?  CKD stage 4 due to type 2 diabetes mellitus ([address] Yes    Ulcer of right foot, unspecified ulcer stage ([address]     Degeneration of lumbar or lumbosacral intervertebral disc     Lumbosacral spondylosis without myelopathy        Plan:  1. CKD stage 4 due to type 2 diabetes mellitus [facility])  Referral given to [place] [facility]. Patient will call to schedule  - Amb External Referral To Podiatry    2. Ulcer of right foot, unspecified ulcer stage ([address]  Placed on antibiotics bactrim and keflex and referral given to podiatry [facility]. I did give 1 week worth of pain medication. Will need to discuss with Dr. Cristiano Harris regarding long-term prescription.   - Amb External Referral To Podiatry  - oxyCODONE-acetaminophen (PERCOCET) 7.5-325 MG per tablet; Take 1 tablet by mouth every 4 hours as needed for Pain for up to 7 days. Dispense: 28 tablet; Refill: 0    3. Degeneration of lumbar or lumbosacral intervertebral disc  New referral to pain management given to Dr. Cinthia Mendoza office in [place]. [place]. Patient was given the number to call and schedule.    - External Referral To Pain Clinic  - oxyCODONE-acetaminophen (PERCOCET) 7.5-325 MG per tablet; Take 1 tablet by mouth every 4 hours as needed for Pain for up to 7 days. Dispense: 28 tablet; Refill: 0    4. Lumbosacral spondylosis without myelopathy  - External Referral To Pain Clinic  - oxyCODONE-acetaminophen (PERCOCET) 7.5-325 MG per tablet; Take 1 tablet by mouth every 4 hours as needed for Pain for up to 7 days. Dispense: 28 tablet;  Refill: 0

## 2021-02-26 ENCOUNTER — TELEPHONE (OUTPATIENT)
Dept: FAMILY MEDICINE CLINIC | Age: 51
End: 2021-02-26

## 2021-02-26 NOTE — TELEPHONE ENCOUNTER
Falguni Olivares from [address] [facility] is calling to state they are going to discharge him from [address] [place] [address] Service due to missed visits and non compliance.      Clarisa Beavers [phone]

## 2021-03-01 ENCOUNTER — TELEPHONE (OUTPATIENT)
Dept: FAMILY MEDICINE CLINIC | Age: 51
End: 2021-03-01

## 2021-03-01 NOTE — TELEPHONE ENCOUNTER
Patient said the specialist Rand Goetz referred did not receive his referral.   Yola Montaño   I see it in the patient's chart so I don't know if their office can view this or not. May need faxed.

## 2021-03-02 ENCOUNTER — VIRTUAL VISIT (OUTPATIENT)
Dept: FAMILY MEDICINE CLINIC | Age: 51
End: 2021-03-02
Payer: COMMERCIAL

## 2021-03-02 ENCOUNTER — CARE COORDINATION (OUTPATIENT)
Dept: CASE MANAGEMENT | Age: 51
End: 2021-03-02

## 2021-03-02 DIAGNOSIS — L03.90 CELLULITIS, UNSPECIFIED CELLULITIS SITE: Primary | ICD-10-CM

## 2021-03-02 PROCEDURE — G8417 CALC BMI ABV UP PARAM F/U: HCPCS | Performed by: FAMILY MEDICINE

## 2021-03-02 PROCEDURE — 1111F DSCHRG MED/CURRENT MED MERGE: CPT | Performed by: FAMILY MEDICINE

## 2021-03-02 PROCEDURE — 99214 OFFICE O/P EST MOD 30 MIN: CPT | Performed by: FAMILY MEDICINE

## 2021-03-02 PROCEDURE — 3017F COLORECTAL CA SCREEN DOC REV: CPT | Performed by: FAMILY MEDICINE

## 2021-03-02 PROCEDURE — G8427 DOCREV CUR MEDS BY ELIG CLIN: HCPCS | Performed by: FAMILY MEDICINE

## 2021-03-02 PROCEDURE — G8482 FLU IMMUNIZE ORDER/ADMIN: HCPCS | Performed by: FAMILY MEDICINE

## 2021-03-02 PROCEDURE — 4004F PT TOBACCO SCREEN RCVD TLK: CPT | Performed by: FAMILY MEDICINE

## 2021-03-02 RX ORDER — OXYCODONE AND ACETAMINOPHEN 7.5; 325 MG/1; MG/1
1 TABLET ORAL EVERY 4 HOURS PRN
Qty: 180 TABLET | Refills: 0 | Status: SHIPPED | OUTPATIENT
Start: 2021-03-02 | End: 2021-03-31 | Stop reason: SDUPTHER

## 2021-03-02 NOTE — CARE COORDINATION
[facility] Transitions Follow Up Call    3/2/2021    Patient: Agustin De Santiago  Patient : 1970   MRN: [id number]  Reason for Admission: cellulitis right foot  Discharge Date: 2/3/21 RARS: Readmission Risk Score: 61         Spoke with: Agustin De Santiago    Patient answered call, but request a call back later. Patient was sitting at doctor's office and unable to talk. CTN rescheduled call      Patient answered call and verified . Patient pleasant and was seen by Dr Ela Valencia this morning and then referred to foot/ankle MD. Patient seen foot MD and had a debridement on ulcers/sores. Foot/ankle MD 3/16/21 for follow up. Patient was being seen by pain MD, but discharged patient because the count was off at last visit. Patient was referred to Dr Muna Parra in [facility] for pain management, but has not scheduled appt yet. Denies any acute needs at present time. Agreeable to f/u calls. Educated on the use of urgent care or physicians 24 hr access line if assistance is needed after hours. Care Transitions Subsequent and Final Call    Subsequent and Final Calls  Care Transitions Interventions  Other Interventions:            Follow Up  Future Appointments   Date Time Provider Nelly Darby   3/2/2021  3:00 PM JAY Crowder CNP   2021  9:00 AM JYA Mariano CNP AND ROBBI Nunes RN

## 2021-03-02 NOTE — PROGRESS NOTES
General: No tenderness. Skin:     General: Skin is warm and dry. Neurological:      Mental Status: He is alert and oriented to person, place, and time. Deep Tendon Reflexes: Reflexes are normal and symmetric. Psychiatric:         Behavior: Behavior normal.         Thought Content:  Thought content normal.         Judgment: Judgment normal.         [INSTRUCTIONS:  \"[x]\" Indicates a positive item  \"[]\" Indicates a negative item  -- DELETE ALL ITEMS NOT EXAMINED]    Constitutional: [x] Appears well-developed and well-nourished [x] No apparent distress      [] Abnormal -     Mental status: [x] Alert and awake  [x] Oriented to person/place/time [x] Able to follow commands    [] Abnormal -     Eyes:   EOM    [x]  Normal    [] Abnormal -   Sclera  [x]  Normal    [] Abnormal -          Discharge [x]  None visible   [] Abnormal -     HENT: [x] Normocephalic, atraumatic  [] Abnormal -   [x] Mouth/Throat: Mucous membranes are moist    External Ears [x] Normal  [] Abnormal -    Neck: [x] No visualized mass [] Abnormal -     Pulmonary/Chest: [x] Respiratory effort normal   [x] No visualized signs of difficulty breathing or respiratory distress        [] Abnormal -      Musculoskeletal:   [x] Normal gait with no signs of ataxia         [x] Normal range of motion of neck        [] Abnormal -     Neurological:        [x] No Facial Asymmetry (Cranial nerve 7 motor function) (limited exam due to video visit)          [x] No gaze palsy        [] Abnormal -          Skin:        [x] No significant exanthematous lesions or discoloration noted on facial skin         [] Abnormal -            Psychiatric:       [x] Normal Affect [] Abnormal -        [x] No Hallucinations    Other pertinent observable physical exam findings:-          On this date 3/2/2021 I have spent 30 minutes reviewing previous notes, test results and face to face (virtual) with the patient discussing the diagnosis and importance of compliance with the treatment plan as well as documenting on the day of the visit. Emi Roshan, was evaluated through a synchronous (real-time) audio-video encounter. The patient (or guardian if applicable) is aware that this is a billable service. Verbal consent to proceed has been obtained within the past 12 months. The visit was conducted pursuant to the emergency declaration under the [address] [place] [address], [address] [place] [address] and the [employer] and [employer] General Act. Patient identification was verified, and a caregiver was present when appropriate. The patient was located in a state where the provider was credentialed to provide care. An electronic signature was used to authenticate this note.     --Claudetta Benes, MD

## 2021-03-09 ENCOUNTER — TELEPHONE (OUTPATIENT)
Dept: FAMILY MEDICINE CLINIC | Age: 51
End: 2021-03-09

## 2021-03-09 ENCOUNTER — CARE COORDINATION (OUTPATIENT)
Dept: CASE MANAGEMENT | Age: 51
End: 2021-03-09

## 2021-03-09 RX ORDER — PANTOPRAZOLE SODIUM 40 MG/1
TABLET, DELAYED RELEASE ORAL
Qty: 90 TABLET | Refills: 1 | Status: SHIPPED | OUTPATIENT
Start: 2021-03-09 | End: 2021-09-03

## 2021-03-09 NOTE — CARE COORDINATION
[facility] Transitions Follow Up Call    3/9/2021    Patient: Juliana Romero  Patient : 1970   MRN: [id number]  Reason for Admission: cellulitis right foot  Discharge Date: 2/3/21 RARS: Readmission Risk Score: 61         Spoke with: Juliana Romero    Patient answered call and verified . Patient pleasant and agreeable to final transition call. Patient feeling good, but continues to have cellulitis on right foot. Patient has follow up appts scheduled with podiatrist every 2 weeks for cellulitis management and foot wound care. Patient has appt with Dr Kalee Toribio pain MD next month for pain medications. Patient confirms that he has pain medication to last until the scheduled appt. Patient taking all medications as directed and denies any acute needs at present time. Educated on the use of urgent care or physicians 24 hr access line if assistance is needed after hours. Final call completed and episode closed. Care Transitions Subsequent and Final Call    Subsequent and Final Calls  Do you have any ongoing symptoms?: No  Have your medications changed?: No  Do you have any questions related to your medications?: No  Do you currently have any active services?: No  Are you currently active with any services?: Home Health  Do you have any needs or concerns that I can assist you with?: No  Identified Barriers: None  Care Transitions Interventions  Other Interventions:            Follow Up  Future Appointments   Date Time Provider Nelly Darby   3/18/2021  2:30 PM Delene Grain, APRN - CNP [facility]   2021  9:00 AM JAY Hines CNP AND ROBBI Guerra RN

## 2021-03-09 NOTE — TELEPHONE ENCOUNTER
Refill on protonix 40 mg # 80 last seen 3.2.2021 Lauren from [facility] pharmacy

## 2021-03-12 NOTE — TELEPHONE ENCOUNTER
LOV  3.2.2021     No future visit     The patient would like a RX for a Dexacom system so he does not have to prick his fingers.      Please advise    Davie Self [phone]    Send to [facility] in [place]

## 2021-03-15 RX ORDER — BLOOD-GLUCOSE,RECEIVER,CONT
EACH MISCELLANEOUS
Qty: 1 DEVICE | Refills: 0 | Status: SHIPPED | OUTPATIENT
Start: 2021-03-15 | End: 2021-05-07

## 2021-03-15 RX ORDER — BLOOD-GLUCOSE SENSOR
EACH MISCELLANEOUS
Qty: 6 EACH | Refills: 3 | Status: SHIPPED | OUTPATIENT
Start: 2021-03-15 | End: 2021-05-07 | Stop reason: ALTCHOICE

## 2021-03-17 ENCOUNTER — TELEPHONE (OUTPATIENT)
Dept: FAMILY MEDICINE CLINIC | Age: 51
End: 2021-03-17

## 2021-03-25 DIAGNOSIS — F39 MOOD DISORDER (HCC): ICD-10-CM

## 2021-03-25 DIAGNOSIS — G47.00 INSOMNIA, UNSPECIFIED TYPE: ICD-10-CM

## 2021-03-25 RX ORDER — QUETIAPINE FUMARATE 50 MG/1
50 TABLET, FILM COATED ORAL EVERY EVENING
Qty: 30 TABLET | Refills: 5 | Status: SHIPPED | OUTPATIENT
Start: 2021-03-25 | End: 2021-08-05

## 2021-03-25 RX ORDER — LOSARTAN POTASSIUM 50 MG/1
TABLET ORAL
Qty: 30 TABLET | Refills: 10 | Status: ON HOLD | OUTPATIENT
Start: 2021-03-25 | End: 2021-09-12 | Stop reason: HOSPADM

## 2021-03-25 NOTE — TELEPHONE ENCOUNTER
Requested Prescriptions     Pending Prescriptions Disp Refills    QUEtiapine (SEROQUEL) 50 MG tablet 30 tablet 5     Sig: Take 1 tablet by mouth every evening   [facility] Jeronimo  Last ov 03/02/21 vv with Dr. Claudia Cardoso

## 2021-03-31 ENCOUNTER — VIRTUAL VISIT (OUTPATIENT)
Dept: FAMILY MEDICINE CLINIC | Age: 51
End: 2021-03-31
Payer: COMMERCIAL

## 2021-03-31 DIAGNOSIS — E11.22 CKD STAGE 4 DUE TO TYPE 2 DIABETES MELLITUS (HCC): ICD-10-CM

## 2021-03-31 DIAGNOSIS — L03.90 CELLULITIS, UNSPECIFIED CELLULITIS SITE: ICD-10-CM

## 2021-03-31 DIAGNOSIS — F39 MOOD DISORDER (HCC): ICD-10-CM

## 2021-03-31 DIAGNOSIS — N18.4 CKD STAGE 4 DUE TO TYPE 2 DIABETES MELLITUS (HCC): ICD-10-CM

## 2021-03-31 PROCEDURE — 3017F COLORECTAL CA SCREEN DOC REV: CPT | Performed by: FAMILY MEDICINE

## 2021-03-31 PROCEDURE — 99214 OFFICE O/P EST MOD 30 MIN: CPT | Performed by: FAMILY MEDICINE

## 2021-03-31 PROCEDURE — 2022F DILAT RTA XM EVC RTNOPTHY: CPT | Performed by: FAMILY MEDICINE

## 2021-03-31 PROCEDURE — 3051F HG A1C>EQUAL 7.0%<8.0%: CPT | Performed by: FAMILY MEDICINE

## 2021-03-31 PROCEDURE — G8417 CALC BMI ABV UP PARAM F/U: HCPCS | Performed by: FAMILY MEDICINE

## 2021-03-31 PROCEDURE — 4004F PT TOBACCO SCREEN RCVD TLK: CPT | Performed by: FAMILY MEDICINE

## 2021-03-31 PROCEDURE — G8482 FLU IMMUNIZE ORDER/ADMIN: HCPCS | Performed by: FAMILY MEDICINE

## 2021-03-31 PROCEDURE — G8427 DOCREV CUR MEDS BY ELIG CLIN: HCPCS | Performed by: FAMILY MEDICINE

## 2021-03-31 RX ORDER — OXYCODONE AND ACETAMINOPHEN 7.5; 325 MG/1; MG/1
1 TABLET ORAL
Qty: 150 TABLET | Refills: 0 | Status: SHIPPED | OUTPATIENT
Start: 2021-03-31 | End: 2021-05-03 | Stop reason: SDUPTHER

## 2021-03-31 RX ORDER — TRAZODONE HYDROCHLORIDE 150 MG/1
TABLET ORAL
Qty: 30 TABLET | Refills: 10 | Status: SHIPPED | OUTPATIENT
Start: 2021-03-31 | End: 2021-05-07 | Stop reason: SDUPTHER

## 2021-03-31 NOTE — PROGRESS NOTES
Petros Lopez (:  1970) is a 44 y.o. male,Established patient, here for evaluation of the following chief complaint(s): Medication Check      ASSESSMENT/PLAN:  1. DM (diabetes mellitus), secondary, uncontrolled, w/neurologic complic [facility])  Lab Results   Component Value Date    LABA1C 7.9 2021     Lab Results   Component Value Date    .0 2021     Controlled, not well, but controlled  2. Mood disorder [facility]  3. CKD stage 4 due to type 2 diabetes mellitus ([facility]  Under care of nephrology. ON dialysis. 4. Cellulitis, unspecified cellulitis site  -     oxyCODONE-acetaminophen (PERCOCET) 7.5-325 MG per tablet; Take 1 tablet by mouth every 4-6 hours as needed for Pain for up to 30 days. , Disp-150 tablet, R-0Normal      No follow-ups on file. SUBJECTIVE/OBJECTIVE:  Petros Lopez is a 44 y.o. male. Patient presents with:  Medication Check      Patient was evaluated recently and has had more pain due to cellultis. Patient has been having worsening mood recently. DM has been fairly well controlled. The patients PMH, surgical history, family history, medications, allergies were all reviewed and updated as appropriate today.         Review of Systems    Patient-Reported Vitals 3/31/2021   Patient-Reported Weight 256lb   Patient-Reported Systolic 882   Patient-Reported Diastolic 91        Physical Exam    [INSTRUCTIONS:  \"[x]\" Indicates a positive item  \"[]\" Indicates a negative item  -- DELETE ALL ITEMS NOT EXAMINED]    Constitutional: [x] Appears well-developed and well-nourished [x] No apparent distress      [] Abnormal -     Mental status: [x] Alert and awake  [x] Oriented to person/place/time [x] Able to follow commands    [] Abnormal -     Eyes:   EOM    [x]  Normal    [] Abnormal -   Sclera  [x]  Normal    [] Abnormal -          Discharge [x]  None visible   [] Abnormal -     HENT: [x] Normocephalic, atraumatic  [] Abnormal -   [x] Mouth/Throat: Mucous membranes are moist    External Ears [x] Normal  [] Abnormal -    Neck: [x] No visualized mass [] Abnormal -     Pulmonary/Chest: [x] Respiratory effort normal   [x] No visualized signs of difficulty breathing or respiratory distress        [] Abnormal -      Musculoskeletal:   [x] Normal gait with no signs of ataxia         [x] Normal range of motion of neck        [] Abnormal -     Neurological:        [x] No Facial Asymmetry (Cranial nerve 7 motor function) (limited exam due to video visit)          [x] No gaze palsy        [] Abnormal -          Skin:        [x] No significant exanthematous lesions or discoloration noted on facial skin         [] Abnormal -            Psychiatric:       [x] Normal Affect [] Abnormal -        [x] No Hallucinations    Other pertinent observable physical exam findings:-          On this date 3/31/2021 I have spent 30 minutes reviewing previous notes, test results and face to face (virtual) with the patient discussing the diagnosis and importance of compliance with the treatment plan as well as documenting on the day of the visit. Jenine Fleischer, was evaluated through a synchronous (real-time) audio-video encounter. The patient (or guardian if applicable) is aware that this is a billable service. Verbal consent to proceed has been obtained within the past 12 months. The visit was conducted pursuant to the emergency declaration under the [address] [place] [address] and the [employer] and [employer] General Act. Patient identification was verified, and a caregiver was present when appropriate. The patient was located in a state where the provider was credentialed to provide care. An electronic signature was used to authenticate this note.     --Dorma Gitelman, MD

## 2021-04-13 ENCOUNTER — NURSE TRIAGE (OUTPATIENT)
Dept: OTHER | Facility: CLINIC | Age: 51
End: 2021-04-13

## 2021-04-13 NOTE — TELEPHONE ENCOUNTER
Reason for Disposition   SEVERE back pain (e.g., excruciating, unable to do any normal activities) and not improved after pain medicine and CARE ADVICE    Answer Assessment - Initial Assessment Questions  1. ONSET: \"When did the pain begin? \"       Onset was about 2 weeks ago    2. LOCATION: \"Where does it hurt? \" (upper, mid or lower back)      Lower back and both hips (right is worse that the left)    3. SEVERITY: \"How bad is the pain? \"  (e.g., Scale 1-10; mild, moderate, or severe)    - MILD (1-3): doesn't interfere with normal activities     - MODERATE (4-7): interferes with normal activities or awakens from sleep     - SEVERE (8-10): excruciating pain, unable to do any normal activities             Back/hip pain is 10/10 - taking Percocet for the back but it is not helping the hips at all    4. PATTERN: \"Is the pain constant? \" (e.g., yes, no; constant, intermittent)       Constant    5. RADIATION: \"Does the pain shoot into your legs or elsewhere? \"      No radiation    6. CAUSE:  \"What do you think is causing the back pain? \"       Chronic back pain - hip problem is new (about 2 months ago)    7. BACK OVERUSE:  [facility] recent lifting of heavy objects, strenuous work or exercise? \"      No    8. MEDICATIONS: \"What have you taken so far for the pain? \" (e.g., nothing, acetaminophen, NSAIDS)      Takes percocet - no help for the hips    9. NEUROLOGIC SYMPTOMS: \"Do you have any weakness, numbness, or problems with bowel/bladder control? \"      No    10. OTHER SYMPTOMS: \"Do you have any other symptoms? \" (e.g., fever, abdominal pain, burning with urination, blood in urine)        No    11. PREGNANCY: \"Is there any chance you are pregnant? \" (e.g., yes, no; LMP)        No    Protocols used: BACK PAIN-ADULT-[place]    Call came in from [facility]    See triage above:  Recommended patient be seen today due to level of pain in bilateral hip in the presence of Percocet.  Patient refuses to be seen today and has dialysis tomorrow. Patient wants to be seen on Thursday. Provided care advice. Transfer to [place] at the [facility] center for scheduling.

## 2021-04-14 ENCOUNTER — VIRTUAL VISIT (OUTPATIENT)
Dept: FAMILY MEDICINE CLINIC | Age: 51
End: 2021-04-14
Payer: COMMERCIAL

## 2021-04-14 DIAGNOSIS — M54.41 LOW BACK PAIN WITH BILATERAL SCIATICA, UNSPECIFIED BACK PAIN LATERALITY, UNSPECIFIED CHRONICITY: ICD-10-CM

## 2021-04-14 DIAGNOSIS — M25.552 LEFT HIP PAIN: ICD-10-CM

## 2021-04-14 DIAGNOSIS — M25.551 RIGHT HIP PAIN: Primary | ICD-10-CM

## 2021-04-14 DIAGNOSIS — M54.42 LOW BACK PAIN WITH BILATERAL SCIATICA, UNSPECIFIED BACK PAIN LATERALITY, UNSPECIFIED CHRONICITY: ICD-10-CM

## 2021-04-14 PROCEDURE — 4004F PT TOBACCO SCREEN RCVD TLK: CPT | Performed by: REGISTERED NURSE

## 2021-04-14 PROCEDURE — G8427 DOCREV CUR MEDS BY ELIG CLIN: HCPCS | Performed by: REGISTERED NURSE

## 2021-04-14 PROCEDURE — G8417 CALC BMI ABV UP PARAM F/U: HCPCS | Performed by: REGISTERED NURSE

## 2021-04-14 PROCEDURE — 3017F COLORECTAL CA SCREEN DOC REV: CPT | Performed by: REGISTERED NURSE

## 2021-04-14 PROCEDURE — 99213 OFFICE O/P EST LOW 20 MIN: CPT | Performed by: REGISTERED NURSE

## 2021-04-14 RX ORDER — METHYLPREDNISOLONE 4 MG/1
TABLET ORAL
Qty: 21 TABLET | Refills: 0 | Status: ON HOLD | OUTPATIENT
Start: 2021-04-14 | End: 2021-04-27 | Stop reason: HOSPADM

## 2021-04-14 ASSESSMENT — ENCOUNTER SYMPTOMS
BACK PAIN: 1
GASTROINTESTINAL NEGATIVE: 1

## 2021-04-14 NOTE — PROGRESS NOTES
2021    TELEHEALTH EVALUATION -- Audio/Visual (During TYFXH-52 public health emergency)    HPI:    Aquilino Kamara (:  1970) has requested an audio/video evaluation for the following concern(s):    He has a chronic back pain and he started having hip pain about 1 month ago. He has trouble sleeping on his sides for more than 20 minutes at a time. He says he fell off the toilet a couple of weeks ago and landed on his butt. This has increased the hip pain. Pain radiating down both legs and his legs feel numb. He says that he used to have numbness in his feet but now he does not feel anything. He uses a scooter. He used to see Dr. Warren Rogers. He does not have a pain doctor at this time but is trying to find someone. Denies trouble with urination. He does have chronic constipation but there is no change in this with the new onset of bilateral hip pain and numbness in lower legs. Review of Systems   Gastrointestinal: Negative. Genitourinary: Negative. Musculoskeletal: Positive for arthralgias, back pain, gait problem and myalgias. Negative for joint swelling and neck pain. Pain in bilateral hips. Pain radiates from back to hips. Skin: Negative. Psychiatric/Behavioral: Positive for sleep disturbance ( due to hip pain). Prior to Visit Medications    Medication Sig Taking? Authorizing Provider   methylPREDNISolone (MEDROL, JUSTIN,) 4 MG tablet Take by mouth. Take 6 tabs on day 1, 5 tabs on day 2, 4 tabs on day 3, 3 tabs on day 4, 2 tabs on day 5, 1 tab on day 6 Yes JAY Lopez - CNP   oxyCODONE-acetaminophen (PERCOCET) 7.5-325 MG per tablet Take 1 tablet by mouth every 4-6 hours as needed for Pain for up to 30 days.  Yes Alba Patel MD   traZODone (DESYREL) 150 MG tablet TAKE (1) TABLET BY MOUTH NIGHTLY Yes Alba Patel MD   QUEtiapine (SEROQUEL) 50 MG tablet Take 1 tablet by mouth every evening Yes Alba Patel MD   losartan (COZAAR) 50 MG tablet TAKE 1 TABLET BY MOUTH DAILY Yes Tim Kim MD   Continuous Blood Gluc  (DEXCOM G5 MOBILE ) [facility] Use to check blood sugar Yes Tim Kim MD   Continuous Blood Gluc Sensor (DEXCOM G4 SENSOR) MISC Use to check blood sugar Yes Tim Kim MD   pantoprazole (PROTONIX) 40 MG tablet TAKE (1) TABLET BY MOUTH EACH MORNING BEFORE BREAKFAST Yes Tim Kim MD   simethicone (MYLICON) 80 MG chewable tablet Take 1 tablet by mouth every 6 hours as needed (cramping) Yes Luis Cole MD   glucose monitoring kit (FREESTYLE) monitoring kit 1 kit by Does not apply route daily Yes Tim Kim MD   blood glucose test strips (GLUCOSE METER TEST) strip 1 each by In Vitro route 5 times daily As needed. Yes Tim Kim MD   nitroGLYCERIN (NITROSTAT) 0.4 MG SL tablet DISSOLVE 1 TABLET UNDER THE TONGUE AS NEEDED FOR CHEST PAIN EVERY 5 MINUTES UP TO 3 TIMES. IF NO RELIEF CALL 911.  Yes Tim Kim MD   Insulin Pen Needle 31G X 5 MM MISC 1 each by Does not apply route 4 times daily Yes JAY Juarez CNP   hydrOXYzine (VISTARIL) 50 MG capsule TAKE 1 TO 2 CAPSULES BY MOUTH NIGHTLY Yes Giulia Kenney MD   lidocaine 4 % external patch Place 1 patch onto the skin daily Yes JAY Izaguirre CNP   hydrALAZINE (APRESOLINE) 50 MG tablet TAKE 1/2 TABLET BY MOUTH EVERY 8 HOURS Yes Tim Kim MD   B Complex-C-Folic Acid (VIRT-CAPS) 1 MG CAPS TK ONE C PO  QD Yes Tim Kim MD   Continuous Blood Gluc Sensor (FREESTYLE STEPHANY 14 DAY SENSOR) [facility] 1 each by Does not apply route every 14 days Yes Tim Kim MD   insulin glargine (BASAGLAR KWIKPEN) 100 UNIT/ML injection pen Inject 70 Units into the skin nightly Yes Tim Kim MD   citalopram (CELEXA) 40 MG tablet TAKE (1) TABLET BY MOUTH DAILY Yes Tim Kim MD   insulin aspart (NOVOLOG FLEXPEN) 100 UNIT/ML injection pen Inject 20 Units into the skin 3 times daily (before meals) Yes Tim Kim MD   clopidogrel (PLAVIX) 75 MG tablet TAKE 1 TABLET BY MOUTH ONCE DAILY Yes Renu Ocampo MD   pravastatin (PRAVACHOL) 80 MG tablet TAKE (1) TABLET BY MOUTH ONCE DAILY Yes Renu Ocampo MD   isosorbide mononitrate (IMDUR) 30 MG extended release tablet TAKE 1 TABLET BY MOUTH EVERY DAY Yes Court Harada, MD   torsemide (DEMADEX) 100 MG tablet Take 1-2 tablets by mouth daily Yes Court Harada, MD   ondansetron (ZOFRAN ODT) 4 MG disintegrating tablet Take 1 tablet by mouth every 8 hours as needed for Nausea Yes Court Harada, MD   LINZESS 145 MCG capsule TAKE 1 CAPSULE BY MOUTH EVERY MORNING BEFORE BREAKFAST Yes Court Harada, MD   Calcium Acetate, Phos Binder, 667 MG CAPS TAKE 1 CAPSULE BY MOUTH THREE TIMES DAILY WITH MEALS Yes Court Harada, MD   tiZANidine (ZANAFLEX) 4 MG tablet TAKE 1 TABLET BY MOUTH THREE TIMES DAILY Yes Court Harada, MD   DULoxetine (CYMBALTA) 30 MG extended release capsule TAKE 1 CAPSULE BY MOUTH EVERY DAY Yes JAY Mendez CNP   nystatin-triamcinolone (MYCOLOG II) 031753-0.5 UNIT/GM-% cream Apply topically 2 times daily. Yes JAY Marrero CNP   albuterol (PROVENTIL) (2.5 MG/3ML) 0.083% nebulizer solution INHALE 1 VIAL VIA NEBULIZER EVERY 6 HOURS AS NEEDED FOR WHEEZING Yes Court Harada, MD   nystatin (MYCOSTATIN) 954057 UNIT/GM cream Apply topically 2 times daily. Yes Court Harada, MD   Insulin Syringe-Needle U-100 30G X 1/2\" 0.5 ML MISC 1 each by Does not apply route daily Yes Court Harada, MD   blood glucose test strips (FREESTYLE LITE) strip Daily As needed.  Yes Court Harada, MD   glucose monitoring kit (FREESTYLE) monitoring kit 1 kit by Does not apply route daily Yes Court Harada, MD   vitamin D (ERGOCALCIFEROL) 71210 units CAPS capsule TK 1 C PO WEEKLY Yes Gerson Velazquez MD   flunisolide (NASALIDE) 25 MCG/ACT (0.025%) SOLN Inhale 2 sprays into the lungs every 12 hours Yes Agustín Jones MD   Tiotropium Bromide-Olodaterol (STIOLTO RESPIMAT) 2.5-2.5 MCG/ACT AERS Inhale 2 puffs into the lungs daily Yes Lauri Oseguera MD   Polyethylene Glycol 3350 GRAN  Yes Historical Provider, MD   Glucose Blood (BLOOD GLUCOSE TEST STRIPS) STRP TEST 3-4 TIMES DAILY, AS DIRECTED Yes Santo Robles MD   Blood Glucose Monitoring Suppl ADAM USE AS DIRECTED. Yes Santo Robles MD   Alcohol Swabs PADS USE AS DIRECTED Yes Santo Robles MD   albuterol sulfate  (90 Base) MCG/ACT inhaler Inhale 2 puffs into the lungs every 6 hours as needed for Wheezing Yes Natasha Wilcox MD   ipratropium-albuterol (DUONEB) 0.5-2.5 (3) MG/3ML SOLN nebulizer solution Inhale 3 mLs into the lungs every 6 hours as needed for Shortness of Breath Yes Dar Laboy MD   Lancets MISC Test daily Yes Ponce Dunne MD   calcium carbonate (TUMS) 500 MG chewable tablet Take 1 tablet by mouth 3 times daily as needed for Heartburn. Yes Historical Provider, MD   aspirin 81 MG chewable tablet Take 1 tablet by mouth daily.   Patient taking differently: Take 81 mg by mouth daily Indications: stopped on  for surgery  Yes Arlie Essex, MD       Social History     Tobacco Use    Smoking status: Current Every Day Smoker     Packs/day: 0.50     Years: 33.00     Pack years: 16.50     Types: Cigarettes     Last attempt to quit: 2020     Years since quittin.9    Smokeless tobacco: Never Used   Substance Use Topics    Alcohol use: Not Currently     Alcohol/week: 0.0 standard drinks     Comment: occ    Drug use: No        Allergies   Allergen Reactions    Morphine Nausea And Vomiting   ,   Past Medical History:   Diagnosis Date    Ambulatory dysfunction     walker for long distances, SOB with distance    Aortic stenosis     echo 2017    Arthritis     hands and hips    Asthma     Bilateral hilar adenopathy syndrome 6/3/2013    CAD (coronary artery disease)     [facility] 2019    EF= 43%    CHF (congestive heart failure) (HCC)     Chronic pain     COPD (chronic obstructive pulmonary disease) [facility])     pulmonology Dr. Will Lobe    Depression     Diabetes mellitus ([facility] [address]     borderline    Difficult intravenous access     Emphysema of lung ([address]     ESRD (end stage renal disease) on dialysis ([address]     MWF    Fear of needles     Gastric ulcer     GERD (gastroesophageal reflux disease)     Heart valve problem     bicuspic valve    Hemodialysis patient ([address]     History of spinal fracture     work incident    Hx of blood clots     Bilateral lower extremities; stents in place    Hyperlipidemia     Hypertension     MI (myocardial infarction) ([address] 2019    has had 9 MIs. 2019 was the last    Neuromuscular disorder ([address]     due to CVA    Numbness and tingling in left arm     from fistula    Pneumonia     PONV (postoperative nausea and vomiting)     Prolonged emergence from general anesthesia     States requires more medication than most people    Sleep apnea     Uses CPAP    Stroke ([facility] [address] [facility]     7mm thalamic cva 2017 deficts left side, left side weakness    TIA (transient ischemic attack)     Unspecified diseases of blood and blood-forming organs    ,   Past Surgical History:   Procedure Laterality Date    AORTIC VALVE REPLACEMENT N/A 10/15/2019    TRANSCATHETER AORTIC VALVE REPLACEMENT FEMORAL APPROACH performed by Curt Ortiz MD at [address] [place], [place] [address] Right 7/2/2019    PERITONEAL DIALYSIS CATHETER REMOVAL performed by Dimas Dugan MD at [facility] COLONOSCOPY  2/29/2015    [facility]    CORONARY ANGIOPLASTY WITH STENT PLACEMENT  05/26/15    CYST REMOVAL  08/14/2013    EXCISION CYSTS, NECK X2 AND ABDOMINAL benign    DIAGNOSTIC CARDIAC CATH LAB PROCEDURE      DIALYSIS FISTULA CREATION Left 10/30/2017    LEFT BRACHIAL CEPHALIC FISTULA    DIALYSIS FISTULA CREATION Left 3/27/2019    LIGATION  AV FISTULA performed by Gil Tapia MD at [facility]. [address], COLON, DIAGNOSTIC      OTHER SURGICAL HISTORY  02/01/2017 laparoscopic cholecystectomy with intraoperative cholangiogram    OTHER SURGICAL HISTORY  2018    PORT PLACEMENT  - vas cath    OTHER SURGICAL HISTORY Bilateral 06/26/2018    laprascopic peritoneal dialysis catheter placement    OTHER SURGICAL HISTORY Right 09/2018    peritoneal dialysis port placed on right side of abdomen    OTHER SURGICAL HISTORY  05/28/2019    PTA/Stenting R External Iliac artery    TX LAP INSERTION TUNNELED INTRAPERITONEAL CATHETER N/A 9/21/2018    LAPAROSCOPIC PERITONEAL DIALYSIS CATHETER REPLACEMENT performed by Neri Charles MD at [address] [place] [address]  01/06/2016    UPPER GASTROINTESTINAL ENDOSCOPY  01/29/2017    possible candida, otherwise normal appearing    VASCULAR SURGERY  aprx 2 years ago    2 stents placed, each side of groin       PHYSICAL EXAMINATION:  [ INSTRUCTIONS:  \"[x]\" Indicates a positive item  \"[]\" Indicates a negative item  -- DELETE ALL ITEMS NOT EXAMINED]    Constitutional: [x] Appears well-developed and well-nourished [x] No apparent distress      [] Abnormal-   Mental status  [x] Alert and awake  [x] Oriented to person/place/time [x]Able to follow commands      Eyes:  EOM    [x]  Normal  [] Abnormal-  Sclera  []  Normal  [] Abnormal -         Discharge []  None visible  [] Abnormal -    HENT:   [x] Normocephalic, atraumatic.   [] Abnormal   [x] Mouth/Throat: Mucous membranes are moist.        Pulmonary/Chest: [x] Respiratory effort normal.  [x] No visualized signs of difficulty breathing or respiratory distress        [] Abnormal-      Musculoskeletal:   [] Normal gait with no signs of ataxia         [x] Normal range of motion of neck        [] Abnormal-       Neurological:        [x] No Facial Asymmetry (Cranial nerve 7 motor function) (limited exam to video visit)          [] No gaze palsy        [] Abnormal-         Skin:        [x] No significant exanthematous lesions or discoloration noted on facial skin provide necessary medical care. The patient (and/or legal guardian) has also been advised to contact this office for worsening conditions or problems, and seek emergency medical treatment and/or call 911 if deemed necessary. Services were provided through a video synchronous discussion virtually to substitute for in-person clinic visit. Patient and provider were located at their individual homes. --JAY Partida - CNP on 4/14/2021 at 9:22 AM    An electronic signature was used to authenticate this note. This is a telehealth visit that was performed with the originating site at Patient Location: Home and Provider Location of: [facility] Verbal consent to participate in video visit was obtained. Pursuant to the emergency declaration under the [facility][note 3][facility], [facility] waiver authority and the [employer] Act, this Virtual Visit was conducted, with patient's consent, to reduce the patient's risk of exposure to COVID-19 and provide continuity of care for an established/new patient. Services were provided through a video synchronous discussion virtually to substitute for in-person clinic visit. I discussed with the patient the nature of our telehealth visits via interactive/real-time audio/video that:   - I would evaluate the patient and recommend diagnostics and treatments based on my assessment   - Our sessions are not being recorded and that personal health information is protected   - Our team would provide follow up care in person if/when the patient needs it.

## 2021-04-23 ENCOUNTER — NURSE TRIAGE (OUTPATIENT)
Dept: OTHER | Facility: CLINIC | Age: 51
End: 2021-04-23

## 2021-04-23 NOTE — TELEPHONE ENCOUNTER
[facility] advised to go to [facility]
was your last menstrual period? \"      N/A    Protocols used: HAND AND WRIST PAIN-ADULT-AH

## 2021-04-24 ENCOUNTER — APPOINTMENT (OUTPATIENT)
Dept: GENERAL RADIOLOGY | Age: 51
DRG: 602 | End: 2021-04-24
Payer: COMMERCIAL

## 2021-04-24 ENCOUNTER — HOSPITAL ENCOUNTER (INPATIENT)
Age: 51
LOS: 3 days | Discharge: HOME | DRG: 602 | End: 2021-04-27
Attending: EMERGENCY MEDICINE | Admitting: INTERNAL MEDICINE
Payer: COMMERCIAL

## 2021-04-24 DIAGNOSIS — L03.90 CELLULITIS, UNSPECIFIED CELLULITIS SITE: ICD-10-CM

## 2021-04-24 DIAGNOSIS — L03.119 CELLULITIS OF HAND: Primary | ICD-10-CM

## 2021-04-24 PROBLEM — L02.519 CELLULITIS AND ABSCESS OF HAND: Status: ACTIVE | Noted: 2021-04-24

## 2021-04-24 LAB
ANION GAP SERPL CALCULATED.3IONS-SCNC: 15 MMOL/L (ref 3–16)
BASOPHILS ABSOLUTE: 0.1 K/UL (ref 0–0.2)
BASOPHILS RELATIVE PERCENT: 0.4 %
BUN BLDV-MCNC: 66 MG/DL (ref 7–20)
CALCIUM SERPL-MCNC: 8.8 MG/DL (ref 8.3–10.6)
CHLORIDE BLD-SCNC: 90 MMOL/L (ref 99–110)
CO2: 24 MMOL/L (ref 21–32)
CREAT SERPL-MCNC: 5.1 MG/DL (ref 0.9–1.3)
EOSINOPHILS ABSOLUTE: 0.5 K/UL (ref 0–0.6)
EOSINOPHILS RELATIVE PERCENT: 3.6 %
GFR AFRICAN AMERICAN: 14
GFR NON-AFRICAN AMERICAN: 12
GLUCOSE BLD-MCNC: 392 MG/DL (ref 70–99)
HCT VFR BLD CALC: 31.1 % (ref 40.5–52.5)
HEMOGLOBIN: 10.3 G/DL (ref 13.5–17.5)
LYMPHOCYTES ABSOLUTE: 0.9 K/UL (ref 1–5.1)
LYMPHOCYTES RELATIVE PERCENT: 6.7 %
MCH RBC QN AUTO: 30.3 PG (ref 26–34)
MCHC RBC AUTO-ENTMCNC: 33.2 G/DL (ref 31–36)
MCV RBC AUTO: 91.3 FL (ref 80–100)
MONOCYTES ABSOLUTE: 0.7 K/UL (ref 0–1.3)
MONOCYTES RELATIVE PERCENT: 5 %
NEUTROPHILS ABSOLUTE: 11.1 K/UL (ref 1.7–7.7)
NEUTROPHILS RELATIVE PERCENT: 84.3 %
PDW BLD-RTO: 15 % (ref 12.4–15.4)
PLATELET # BLD: 200 K/UL (ref 135–450)
PMV BLD AUTO: 8.3 FL (ref 5–10.5)
POTASSIUM REFLEX MAGNESIUM: 4 MMOL/L (ref 3.5–5.1)
RBC # BLD: 3.4 M/UL (ref 4.2–5.9)
SEDIMENTATION RATE, ERYTHROCYTE: 68 MM/HR (ref 0–20)
SODIUM BLD-SCNC: 129 MMOL/L (ref 136–145)
WBC # BLD: 13.1 K/UL (ref 4–11)

## 2021-04-24 PROCEDURE — 85652 RBC SED RATE AUTOMATED: CPT

## 2021-04-24 PROCEDURE — 6370000000 HC RX 637 (ALT 250 FOR IP): Performed by: NURSE PRACTITIONER

## 2021-04-24 PROCEDURE — 94150 VITAL CAPACITY TEST: CPT

## 2021-04-24 PROCEDURE — 5A1D70Z PERFORMANCE OF URINARY FILTRATION, INTERMITTENT, LESS THAN 6 HOURS PER DAY: ICD-10-PCS | Performed by: INTERNAL MEDICINE

## 2021-04-24 PROCEDURE — 85025 COMPLETE CBC W/AUTO DIFF WBC: CPT

## 2021-04-24 PROCEDURE — 6370000000 HC RX 637 (ALT 250 FOR IP): Performed by: INTERNAL MEDICINE

## 2021-04-24 PROCEDURE — 6360000002 HC RX W HCPCS: Performed by: EMERGENCY MEDICINE

## 2021-04-24 PROCEDURE — 2580000003 HC RX 258: Performed by: EMERGENCY MEDICINE

## 2021-04-24 PROCEDURE — 2580000003 HC RX 258: Performed by: INTERNAL MEDICINE

## 2021-04-24 PROCEDURE — 86140 C-REACTIVE PROTEIN: CPT

## 2021-04-24 PROCEDURE — 99285 EMERGENCY DEPT VISIT HI MDM: CPT

## 2021-04-24 PROCEDURE — 94640 AIRWAY INHALATION TREATMENT: CPT

## 2021-04-24 PROCEDURE — 1200000000 HC SEMI PRIVATE

## 2021-04-24 PROCEDURE — 80048 BASIC METABOLIC PNL TOTAL CA: CPT

## 2021-04-24 PROCEDURE — 6360000002 HC RX W HCPCS: Performed by: INTERNAL MEDICINE

## 2021-04-24 PROCEDURE — 6370000000 HC RX 637 (ALT 250 FOR IP): Performed by: EMERGENCY MEDICINE

## 2021-04-24 PROCEDURE — 73130 X-RAY EXAM OF HAND: CPT

## 2021-04-24 PROCEDURE — 99222 1ST HOSP IP/OBS MODERATE 55: CPT | Performed by: ORTHOPAEDIC SURGERY

## 2021-04-24 PROCEDURE — 2500000003 HC RX 250 WO HCPCS: Performed by: INTERNAL MEDICINE

## 2021-04-24 RX ORDER — ONDANSETRON 2 MG/ML
4 INJECTION INTRAMUSCULAR; INTRAVENOUS EVERY 6 HOURS PRN
Status: DISCONTINUED | OUTPATIENT
Start: 2021-04-24 | End: 2021-04-27 | Stop reason: HOSPADM

## 2021-04-24 RX ORDER — HYDRALAZINE HYDROCHLORIDE 25 MG/1
25 TABLET, FILM COATED ORAL EVERY 8 HOURS SCHEDULED
Status: DISCONTINUED | OUTPATIENT
Start: 2021-04-24 | End: 2021-04-27 | Stop reason: HOSPADM

## 2021-04-24 RX ORDER — PRAVASTATIN SODIUM 40 MG
40 TABLET ORAL NIGHTLY
Status: DISCONTINUED | OUTPATIENT
Start: 2021-04-24 | End: 2021-04-27 | Stop reason: HOSPADM

## 2021-04-24 RX ORDER — PANTOPRAZOLE SODIUM 40 MG/1
40 TABLET, DELAYED RELEASE ORAL
Status: DISCONTINUED | OUTPATIENT
Start: 2021-04-25 | End: 2021-04-27 | Stop reason: HOSPADM

## 2021-04-24 RX ORDER — OXYCODONE AND ACETAMINOPHEN 7.5; 325 MG/1; MG/1
1 TABLET ORAL EVERY 4 HOURS PRN
Status: COMPLETED | OUTPATIENT
Start: 2021-04-24 | End: 2021-04-25

## 2021-04-24 RX ORDER — ACETAMINOPHEN 325 MG/1
650 TABLET ORAL EVERY 6 HOURS PRN
Status: DISCONTINUED | OUTPATIENT
Start: 2021-04-24 | End: 2021-04-27 | Stop reason: HOSPADM

## 2021-04-24 RX ORDER — ALBUTEROL SULFATE 2.5 MG/3ML
2.5 SOLUTION RESPIRATORY (INHALATION) EVERY 6 HOURS PRN
Status: DISCONTINUED | OUTPATIENT
Start: 2021-04-24 | End: 2021-04-27 | Stop reason: HOSPADM

## 2021-04-24 RX ORDER — PROMETHAZINE HYDROCHLORIDE 25 MG/1
12.5 TABLET ORAL EVERY 6 HOURS PRN
Status: DISCONTINUED | OUTPATIENT
Start: 2021-04-24 | End: 2021-04-27 | Stop reason: HOSPADM

## 2021-04-24 RX ORDER — ACETAMINOPHEN 650 MG/1
650 SUPPOSITORY RECTAL EVERY 6 HOURS PRN
Status: DISCONTINUED | OUTPATIENT
Start: 2021-04-24 | End: 2021-04-27 | Stop reason: HOSPADM

## 2021-04-24 RX ORDER — SODIUM CHLORIDE 0.9 % (FLUSH) 0.9 %
5-40 SYRINGE (ML) INJECTION PRN
Status: DISCONTINUED | OUTPATIENT
Start: 2021-04-24 | End: 2021-04-27 | Stop reason: HOSPADM

## 2021-04-24 RX ORDER — QUETIAPINE FUMARATE 25 MG/1
50 TABLET, FILM COATED ORAL EVERY EVENING
Status: DISCONTINUED | OUTPATIENT
Start: 2021-04-24 | End: 2021-04-27 | Stop reason: HOSPADM

## 2021-04-24 RX ORDER — HYDROMORPHONE HYDROCHLORIDE 2 MG/1
0.5 TABLET ORAL ONCE
Status: COMPLETED | OUTPATIENT
Start: 2021-04-24 | End: 2021-04-24

## 2021-04-24 RX ORDER — INSULIN GLARGINE 100 [IU]/ML
30 INJECTION, SOLUTION SUBCUTANEOUS NIGHTLY
Status: DISCONTINUED | OUTPATIENT
Start: 2021-04-24 | End: 2021-04-27 | Stop reason: HOSPADM

## 2021-04-24 RX ORDER — POLYETHYLENE GLYCOL 3350 17 G/17G
17 POWDER, FOR SOLUTION ORAL DAILY PRN
Status: DISCONTINUED | OUTPATIENT
Start: 2021-04-24 | End: 2021-04-27 | Stop reason: HOSPADM

## 2021-04-24 RX ORDER — HEPARIN SODIUM 5000 [USP'U]/ML
5000 INJECTION, SOLUTION INTRAVENOUS; SUBCUTANEOUS EVERY 8 HOURS SCHEDULED
Status: DISCONTINUED | OUTPATIENT
Start: 2021-04-24 | End: 2021-04-27 | Stop reason: HOSPADM

## 2021-04-24 RX ORDER — SODIUM CHLORIDE 9 MG/ML
25 INJECTION, SOLUTION INTRAVENOUS PRN
Status: DISCONTINUED | OUTPATIENT
Start: 2021-04-24 | End: 2021-04-27 | Stop reason: HOSPADM

## 2021-04-24 RX ORDER — CALCIUM ACETATE 667 MG/1
667 CAPSULE ORAL 3 TIMES DAILY
Status: DISCONTINUED | OUTPATIENT
Start: 2021-04-24 | End: 2021-04-27 | Stop reason: HOSPADM

## 2021-04-24 RX ORDER — SODIUM CHLORIDE 0.9 % (FLUSH) 0.9 %
5-40 SYRINGE (ML) INJECTION EVERY 12 HOURS SCHEDULED
Status: DISCONTINUED | OUTPATIENT
Start: 2021-04-24 | End: 2021-04-27 | Stop reason: HOSPADM

## 2021-04-24 RX ORDER — ISOSORBIDE MONONITRATE 30 MG/1
30 TABLET, EXTENDED RELEASE ORAL DAILY
Status: DISCONTINUED | OUTPATIENT
Start: 2021-04-24 | End: 2021-04-27 | Stop reason: HOSPADM

## 2021-04-24 RX ORDER — LOSARTAN POTASSIUM 25 MG/1
50 TABLET ORAL DAILY
Status: DISCONTINUED | OUTPATIENT
Start: 2021-04-24 | End: 2021-04-27 | Stop reason: HOSPADM

## 2021-04-24 RX ORDER — HYDROCODONE BITARTRATE AND ACETAMINOPHEN 5; 325 MG/1; MG/1
1 TABLET ORAL ONCE
Status: COMPLETED | OUTPATIENT
Start: 2021-04-24 | End: 2021-04-24

## 2021-04-24 RX ORDER — DULOXETIN HYDROCHLORIDE 30 MG/1
30 CAPSULE, DELAYED RELEASE ORAL DAILY
Status: DISCONTINUED | OUTPATIENT
Start: 2021-04-24 | End: 2021-04-27 | Stop reason: HOSPADM

## 2021-04-24 RX ORDER — OXYCODONE AND ACETAMINOPHEN 7.5; 325 MG/1; MG/1
1 TABLET ORAL ONCE
Status: COMPLETED | OUTPATIENT
Start: 2021-04-24 | End: 2021-04-24

## 2021-04-24 RX ORDER — ASPIRIN 81 MG/1
81 TABLET, CHEWABLE ORAL DAILY
Status: DISCONTINUED | OUTPATIENT
Start: 2021-04-24 | End: 2021-04-27 | Stop reason: HOSPADM

## 2021-04-24 RX ORDER — CITALOPRAM 20 MG/1
40 TABLET ORAL DAILY
Status: DISCONTINUED | OUTPATIENT
Start: 2021-04-24 | End: 2021-04-27 | Stop reason: HOSPADM

## 2021-04-24 RX ADMIN — OXYCODONE HYDROCHLORIDE AND ACETAMINOPHEN 1 TABLET: 7.5; 325 TABLET ORAL at 17:59

## 2021-04-24 RX ADMIN — CALCIUM ACETATE 667 MG: 667 CAPSULE ORAL at 22:58

## 2021-04-24 RX ADMIN — PRAVASTATIN SODIUM 40 MG: 40 TABLET ORAL at 23:59

## 2021-04-24 RX ADMIN — HYDRALAZINE HYDROCHLORIDE 25 MG: 25 TABLET, FILM COATED ORAL at 22:58

## 2021-04-24 RX ADMIN — OXYCODONE HYDROCHLORIDE AND ACETAMINOPHEN 1 TABLET: 7.5; 325 TABLET ORAL at 22:59

## 2021-04-24 RX ADMIN — HYDROCODONE BITARTRATE AND ACETAMINOPHEN 1 TABLET: 5; 325 TABLET ORAL at 14:57

## 2021-04-24 RX ADMIN — SODIUM CHLORIDE, PRESERVATIVE FREE 10 ML: 5 INJECTION INTRAVENOUS at 23:01

## 2021-04-24 RX ADMIN — VANCOMYCIN HYDROCHLORIDE 1750 MG: 1 INJECTION, POWDER, LYOPHILIZED, FOR SOLUTION INTRAVENOUS at 22:58

## 2021-04-24 RX ADMIN — QUETIAPINE FUMARATE 50 MG: 25 TABLET ORAL at 22:58

## 2021-04-24 RX ADMIN — HYDROMORPHONE HYDROCHLORIDE 0.5 MG: 2 TABLET ORAL at 19:31

## 2021-04-24 RX ADMIN — PIPERACILLIN AND TAZOBACTAM 3375 MG: 3; .375 INJECTION, POWDER, LYOPHILIZED, FOR SOLUTION INTRAVENOUS at 19:35

## 2021-04-24 RX ADMIN — GLYCOPYRROLATE AND FORMOTEROL FUMARATE 2 PUFF: 9; 4.8 AEROSOL, METERED RESPIRATORY (INHALATION) at 22:19

## 2021-04-24 ASSESSMENT — PAIN SCALES - GENERAL
PAINLEVEL_OUTOF10: 9
PAINLEVEL_OUTOF10: 9
PAINLEVEL_OUTOF10: 7
PAINLEVEL_OUTOF10: 7
PAINLEVEL_OUTOF10: 8

## 2021-04-24 ASSESSMENT — PAIN DESCRIPTION - LOCATION: LOCATION: ARM;BACK

## 2021-04-24 NOTE — ED NOTES
Call from lab critical results pt Creatinine 5.1. Dr Lynn Lo made aware.      Lupe López, GUMEN  51/17/97 [id number]

## 2021-04-24 NOTE — ED NOTES
Pt request something to eat. Food ordered through dietary department.      Ramesh Deleon LPN  35/20/14 [id number]

## 2021-04-24 NOTE — ED PROVIDER NOTES
Lupis Morton C5 - MED [facility]  EMERGENCY DEPARTMENT ENCOUNTER      Pt Name: Salina Dillard  MRN: [id number]  [place] 1970  Date of evaluation: 4/24/2021  Provider: Mart Crouch MD    CHIEF COMPLAINT       Chief Complaint   Patient presents with    Hand Pain     left started yesterday  unaware of injury/hurts in fingers/swelling         HISTORY OF PRESENT ILLNESS   (Location/Symptom, Timing/Onset, Context/Setting, Quality, Duration, Modifying Factors, Severity)  Note limiting factors. Salina Dillard is a 46 y.o. male with past medical history of hypertension, hyperlipidemia, diabetes, coronary artery disease, COPD, peripheral vascular disease, bicuspid aortic valve status post TAVR and end-stage renal disease on hemodialysis here today with pain in his left hand    Patient states he woke up yesterday morning with the pain in his left hand. Notes pain at the base of the left second through fourth fingers. States he had redness and swelling. He has pain with any attempted range of motion. He denies any trauma or injury. He has had no fever or chills but states his fingers and hand feel warm. Notes the pain is moderate to severe worse with any attempted range of motion. No obvious alleviating factors. No pain in any other joint. He states he does not remember any trauma but is concerned that his fingers may be broken or that he could have an infection in his hand. No history of gout. HPI    Nursing Notes were reviewed. REVIEW OF SYSTEMS    (2-9 systems for level 4, 10 or more for level 5)     Review of Systems    Please see HPI for pertinent positive and negative review of system findings. A full 10 system ROS was performed and otherwise negative.         PAST MEDICAL HISTORY     Past Medical History:   Diagnosis Date    Ambulatory dysfunction     walker for long distances, SOB with distance    Aortic stenosis     echo 2017    Arthritis     hands and hips    Asthma     Bilateral hilar adenopathy syndrome 6/3/2013    CAD (coronary artery disease)     Dr. Wallace Soni [facility]) 04/19/2019    EF= 43%    CHF (congestive heart failure) ([address]     Chronic pain     COPD (chronic obstructive pulmonary disease) ([address]     pulmonology Dr. Brad Ochoa    Depression     Diabetes mellitus ([address]     borderline    Difficult intravenous access     Emphysema of lung ([address]     ESRD (end stage renal disease) on dialysis ([address]     MWF    Fear of needles     Gastric ulcer     GERD (gastroesophageal reflux disease)     Heart valve problem     bicuspic valve    Hemodialysis patient ([address]     History of spinal fracture     work incident    Hx of blood clots     Bilateral lower extremities; stents in place    Hyperlipidemia     Hypertension     MI (myocardial infarction) ([address] 2019    has had 9 MIs. 2019 was the last    Neuromuscular disorder ([address]     due to CVA    Numbness and tingling in left arm     from fistula    Pneumonia     PONV (postoperative nausea and vomiting)     Prolonged emergence from general anesthesia     States requires more medication than most people    Sleep apnea     Uses CPAP    Stroke ([address]     7mm thalamic cva 2017 deficts left side, left side weakness    TIA (transient ischemic attack)     Unspecified diseases of blood and blood-forming organs          SURGICAL HISTORY       Past Surgical History:   Procedure Laterality Date    AORTIC VALVE REPLACEMENT N/A 10/15/2019    TRANSCATHETER AORTIC VALVE REPLACEMENT FEMORAL APPROACH performed by Stormy Amaro MD at [address] [place] [address] Ave Right 7/2/2019    PERITONEAL DIALYSIS CATHETER REMOVAL performed by Savita Tripp MD at [facility] COLONOSCOPY  2/29/2015    WN    CORONARY ANGIOPLASTY WITH STENT PLACEMENT  05/26/15    CYST REMOVAL  08/14/2013    EXCISION CYSTS, NECK X2 AND ABDOMINAL benign    DIAGNOSTIC CARDIAC CATH LAB PROCEDURE      DIALYSIS FISTULA CREATION Left 10/30/2017    LEFT BRACHIAL CEPHALIC FISTULA    DIALYSIS FISTULA CREATION Left 3/27/2019    LIGATION  AV FISTULA performed by Jayson Prakash MD at [address], COLON, DIAGNOSTIC      OTHER SURGICAL HISTORY  02/01/2017    laparoscopic cholecystectomy with intraoperative cholangiogram    OTHER SURGICAL HISTORY  2018    PORT PLACEMENT  - vas cath    OTHER SURGICAL HISTORY Bilateral 06/26/2018    laprascopic peritoneal dialysis catheter placement    OTHER SURGICAL HISTORY Right 09/2018    peritoneal dialysis port placed on right side of abdomen    OTHER SURGICAL HISTORY  05/28/2019    PTA/Stenting R External Iliac artery    AR LAP INSERTION TUNNELED INTRAPERITONEAL CATHETER N/A 9/21/2018    LAPAROSCOPIC PERITONEAL DIALYSIS CATHETER REPLACEMENT performed by Kev Haas MD at [facility]  01/06/2016    UPPER GASTROINTESTINAL ENDOSCOPY  01/29/2017    possible candida, otherwise normal appearing    VASCULAR SURGERY  aprx 2 years ago    2 stents placed, each side of groin         CURRENT MEDICATIONS       Current Discharge Medication List      CONTINUE these medications which have NOT CHANGED    Details   methylPREDNISolone (MEDROL, JUSTIN,) 4 MG tablet Take by mouth. Take 6 tabs on day 1, 5 tabs on day 2, 4 tabs on day 3, 3 tabs on day 4, 2 tabs on day 5, 1 tab on day 6  Qty: 21 tablet, Refills: 0    Associated Diagnoses: Right hip pain; Left hip pain      oxyCODONE-acetaminophen (PERCOCET) 7.5-325 MG per tablet Take 1 tablet by mouth every 4-6 hours as needed for Pain for up to 30 days.   Qty: 150 tablet, Refills: 0    Comments: Reduce doses taken as pain becomes manageable  Associated Diagnoses: Cellulitis, unspecified cellulitis site      traZODone (DESYREL) 150 MG tablet TAKE (1) TABLET BY MOUTH NIGHTLY  Qty: 30 tablet, Refills: 10      QUEtiapine (SEROQUEL) 50 MG tablet Take 1 tablet by mouth every evening  Qty: 30 tablet, Refills: 5    Associated Diagnoses: Insomnia, unspecified type; Mood disorder (HCC)      losartan (COZAAR) 50 MG tablet TAKE 1 TABLET BY MOUTH DAILY  Qty: 30 tablet, Refills: 10      Continuous Blood Gluc  (DEXCOM G5 MOBILE ) ADAM Use to check blood sugar  Qty: 1 Device, Refills: 0    Associated Diagnoses: DM (diabetes mellitus), secondary, uncontrolled, w/neurologic complic ([address]      ! ! Continuous Blood Gluc Sensor (DEXCOM G4 SENSOR) MISC Use to check blood sugar  Qty: 6 each, Refills: 3    Associated Diagnoses: DM (diabetes mellitus), secondary, uncontrolled, w/neurologic complic (HCC)      pantoprazole (PROTONIX) 40 MG tablet TAKE (1) TABLET BY MOUTH EACH MORNING BEFORE BREAKFAST  Qty: 90 tablet, Refills: 1      simethicone (MYLICON) 80 MG chewable tablet Take 1 tablet by mouth every 6 hours as needed (cramping)  Qty: 15 tablet, Refills: 0      !! glucose monitoring kit (FREESTYLE) monitoring kit 1 kit by Does not apply route daily  Qty: 1 kit, Refills: 0    Comments: Which ever is coverd. !! blood glucose test strips (GLUCOSE METER TEST) strip 1 each by In Vitro route 5 times daily As needed. Qty: 100 each, Refills: 3      nitroGLYCERIN (NITROSTAT) 0.4 MG SL tablet DISSOLVE 1 TABLET UNDER THE TONGUE AS NEEDED FOR CHEST PAIN EVERY 5 MINUTES UP TO 3 TIMES. IF NO RELIEF CALL 911.   Qty: 25 tablet, Refills: 10    Associated Diagnoses: Coronary artery disease involving native heart without angina pectoris, unspecified vessel or lesion type      Insulin Pen Needle 31G X 5 MM MISC 1 each by Does not apply route 4 times daily  Qty: 300 each, Refills: 3      hydrOXYzine (VISTARIL) 50 MG capsule TAKE 1 TO 2 CAPSULES BY MOUTH NIGHTLY  Qty: 60 capsule, Refills: 5      lidocaine 4 % external patch Place 1 patch onto the skin daily  Qty: 30 patch, Refills: 0      hydrALAZINE (APRESOLINE) 50 MG tablet TAKE 1/2 TABLET BY MOUTH EVERY 8 HOURS  Qty: 90 tablet, Refills: 2      B Complex-C-Folic Acid (VIRT-CAPS) 1 MG CAPS TK ONE C PO  QD  Qty: 90 capsule, Refills: 1      !!  Continuous Blood Gluc Sensor (FREESTYLE STEPHANY 14 DAY SENSOR) MISC 1 each by Does not apply route every 14 days  Qty: 6 each, Refills: 3    Associated Diagnoses: Type 2 diabetes mellitus with diabetic peripheral angiopathy without gangrene, with long-term current use of insulin ([facility])      insulin glargine (BASAGLAR KWIKPEN) 100 UNIT/ML injection pen Inject 70 Units into the skin nightly  Qty: 15 mL, Refills: 5    Associated Diagnoses: Type 2 diabetes mellitus with diabetic nephropathy, with long-term current use of insulin ([facility])      citalopram (CELEXA) 40 MG tablet TAKE (1) TABLET BY MOUTH DAILY  Qty: 30 tablet, Refills: 10    Associated Diagnoses: Depression, unspecified depression type      insulin aspart (NOVOLOG FLEXPEN) 100 UNIT/ML injection pen Inject 20 Units into the skin 3 times daily (before meals)  Qty: 15 pen, Refills: 5    Associated Diagnoses: Type 2 diabetes mellitus with diabetic nephropathy, with long-term current use of insulin ([facility])      clopidogrel (PLAVIX) 75 MG tablet TAKE 1 TABLET BY MOUTH ONCE DAILY  Qty: 90 tablet, Refills: 3      pravastatin (PRAVACHOL) 80 MG tablet TAKE (1) TABLET BY MOUTH ONCE DAILY  Qty: 90 tablet, Refills: 3      isosorbide mononitrate (IMDUR) 30 MG extended release tablet TAKE 1 TABLET BY MOUTH EVERY DAY  Qty: 90 tablet, Refills: 10      torsemide (DEMADEX) 100 MG tablet Take 1-2 tablets by mouth daily  Qty: 180 tablet, Refills: 3      ondansetron (ZOFRAN ODT) 4 MG disintegrating tablet Take 1 tablet by mouth every 8 hours as needed for Nausea  Qty: 60 tablet, Refills: 0      LINZESS 145 MCG capsule TAKE 1 CAPSULE BY MOUTH EVERY MORNING BEFORE BREAKFAST  Qty: 30 capsule, Refills: 10    Associated Diagnoses: Chronic idiopathic constipation      Calcium Acetate, Phos Binder, 667 MG CAPS TAKE 1 CAPSULE BY MOUTH THREE TIMES DAILY WITH MEALS  Qty: 90 capsule, Refills: 3      tiZANidine (ZANAFLEX) 4 MG tablet TAKE 1 TABLET BY MOUTH THREE TIMES DAILY  Qty: 90 tablet, Refills: 10      DULoxetine (CYMBALTA) 30 MG extended release capsule TAKE 1 CAPSULE BY MOUTH EVERY DAY  Qty: 90 capsule, Refills: 10    Associated Diagnoses: Depression, unspecified depression type      nystatin-triamcinolone (MYCOLOG II) 557074-3.1 UNIT/GM-% cream Apply topically 2 times daily. Qty: 60 g, Refills: 2      albuterol (PROVENTIL) (2.5 MG/3ML) 0.083% nebulizer solution INHALE 1 VIAL VIA NEBULIZER EVERY 6 HOURS AS NEEDED FOR WHEEZING  Qty: 300 mL, Refills: 10      nystatin (MYCOSTATIN) 855761 UNIT/GM cream Apply topically 2 times daily. Qty: 60 g, Refills: 3    Associated Diagnoses: Yeast dermatitis      Insulin Syringe-Needle U-100 30G X 1/2\" 0.5 ML MISC 1 each by Does not apply route daily  Qty: 100 each, Refills: 3      !! blood glucose test strips (FREESTYLE LITE) strip Daily As needed. Qty: 100 strip, Refills: 3    Comments: Please dispense what is covered by insurance      !! glucose monitoring kit (FREESTYLE) monitoring kit 1 kit by Does not apply route daily  Qty: 1 kit, Refills: 0    Comments: Please dispense what is covered by insurance      vitamin D (ERGOCALCIFEROL) 32975 units CAPS capsule TK 1 C PO WEEKLY  Refills: 11      flunisolide (NASALIDE) 25 MCG/ACT (0.025%) SOLN Inhale 2 sprays into the lungs every 12 hours  Qty: 1 Bottle, Refills: 5      Tiotropium Bromide-Olodaterol (STIOLTO RESPIMAT) 2.5-2.5 MCG/ACT AERS Inhale 2 puffs into the lungs daily  Qty: 2 Inhaler, Refills: 0    Comments: 2 samples given:  Lot #[id number], Exp 7/21 & Lot #[id number], Exp 7/21      Polyethylene Glycol 3350 GRAN       !! Glucose Blood (BLOOD GLUCOSE TEST STRIPS) STRP TEST 3-4 TIMES DAILY, AS DIRECTED  Qty: 100 strip, Refills: 3      Blood Glucose Monitoring Suppl ADAM USE AS DIRECTED. Qty: 1 Device, Refills: 0    Comments: WITH CONTROL SOLUTION.       Alcohol Swabs PADS USE AS DIRECTED  Qty: 300 each, Refills: 3      albuterol sulfate  (90 Base) MCG/ACT inhaler Inhale 2 puffs into the lungs every 6 hours as needed for Wheezing  Qty: 1 Inhaler, Refills: 3      ipratropium-albuterol (DUONEB) 0.5-2.5 (3) MG/3ML SOLN nebulizer solution Inhale 3 mLs into the lungs every 6 hours as needed for Shortness of Breath  Qty: 360 mL, Refills: 1      Lancets MISC Test daily  Qty: 100 each, Refills: 3      calcium carbonate (TUMS) 500 MG chewable tablet Take 1 tablet by mouth 3 times daily as needed for Heartburn. aspirin 81 MG chewable tablet Take 1 tablet by mouth daily. Qty: 30 tablet, Refills: 2       !! - Potential duplicate medications found. Please discuss with provider.           ALLERGIES     Morphine    FAMILY HISTORY       Family History   Problem Relation Age of Onset    Diabetes Mother     Heart Disease Father     Kidney Disease Sister         stage 4-kidney failure    Cancer Sister     Heart Disease Sister     Obesity Sister     Cancer Sister     Heart Disease Sister     Obesity Sister     Alcohol Abuse Brother           SOCIAL HISTORY       Social History     Socioeconomic History    Marital status:      Spouse name: None    Number of children: None    Years of education: None    Highest education level: None   Occupational History    None   Social Needs    Financial resource strain: None    Food insecurity     Worry: None     Inability: None    Transportation needs     Medical: None     Non-medical: None   Tobacco Use    Smoking status: Current Every Day Smoker     Packs/day: 0.50     Years: 33.00     Pack years: 16.50     Types: Cigarettes     Last attempt to quit: 2020     Years since quittin.9    Smokeless tobacco: Never Used   Substance and Sexual Activity    Alcohol use: Not Currently     Alcohol/week: 0.0 standard drinks     Comment: occ    Drug use: No    Sexual activity: Yes     Partners: Female     Comment:    Lifestyle    Physical activity     Days per week: None     Minutes per session: None    Stress: None   Relationships    Social connections     Talks on phone: None     Gets together: None     Attends Synagogue service: None     Active member of club or organization: None     Attends meetings of clubs or organizations: None     Relationship status: None    Intimate partner violence     Fear of current or ex partner: None     Emotionally abused: None     Physically abused: None     Forced sexual activity: None   Other Topics Concern    None   Social History Narrative    None       SCREENINGS    Robert Coma Scale  Eye Opening: Spontaneous  Best Verbal Response: Oriented  Best Motor Response: Obeys commands  Robert Coma Scale Score: 15          PHYSICAL EXAM    (up to 7 for level 4, 8 or more for level 5)     ED Triage Vitals   BP Temp Temp Source Pulse Resp SpO2 Height Weight   04/24/21 1343 04/24/21 1343 04/24/21 1343 04/24/21 1343 04/24/21 1343 04/24/21 1343 04/24/21 1338 04/24/21 1338   (!) 157/73 97.9 °F (36.6 °C) Oral 80 16 99 % 5' 9\" (1.753 m) 262 lb (118.8 kg)       Physical Exam      General appearance:  Cooperative. No acute distress. Skin:  Warm. Dry. Eye:  Extraocular movements intact. Ears, nose, mouth and throat:  Oral mucosa moist,  Neck:  Trachea midline. Heart:  Regular rate and rhythm  Perfusion:  intact with good capillary refill distally in all digits of the left hand  Respiratory:  Lungs clear to auscultation bilaterally. Respirations nonlabored. Abdominal:   Non distended. Nontender  Neurological:  Alert and oriented x 3. Moves all extremities spontaneously. Distal sensation to two-point discrimination throughout the left first through fifth digits is intact and normal.  Musculoskeletal: Soft tissue swelling edema and erythema noted in the left hand over the palmar aspect particularly over the second through fourth MCP joints.   Significant tenderness to palpation over the site as well that extends up to the proximal phalanx of these Laboratory  [address] [place] [address] [employer]   Phone [phone]   BASIC METABOLIC PANEL W/ REFLEX TO MG FOR LOW K - Abnormal; Notable for the following components:    Sodium 130 (*)     Chloride 89 (*)     Glucose 322 (*)     BUN 77 (*)     CREATININE 6.2 (*)     GFR Non- 10 (*)     GFR  12 (*)     All other components within normal limits    Narrative:     CALL  [facility]  SAC[facility] tel. [phone],  Previous panic on this admission - call not needed per SOP, 04/25/2021 08:18,  by [facility]  Previous panic on this admission - call not needed per SOP, 04/25/2021 08:17,  by [facility]  Previous panic on this admission - call not needed per SOP, 04/25/2021 08:16,  by [facility]  Performed at:  [place] [address] [employer]   Phone [phone]   CBC WITH AUTO DIFFERENTIAL - Abnormal; Notable for the following components:    RBC 3.33 (*)     Hemoglobin 10.2 (*)     Hematocrit 30.4 (*)     Neutrophils Absolute 8.2 (*)     All other components within normal limits    Narrative:     Performed at:  [address], [facility] [employer]   Phone [phone]   POCT GLUCOSE - Abnormal; Notable for the following components:    POC Glucose 302 (*)     All other components within normal limits    Narrative:     Performed at:  [address], [facility] [employer]   Phone [phone]   POCT GLUCOSE - Abnormal; Notable for the following components:    POC Glucose 293 (*)     All other components within normal limits    Narrative:     Performed at:  [address], [facility] [employer]   Phone [phone]   POCT GLUCOSE - Abnormal; Notable for the following components:    POC Glucose 279 (*)     All other components within normal limits    Narrative:     Performed at:  [facility]  [address] [place] [address], [facility] [employer]   Phone [phone]   POCT GLUCOSE - Abnormal; Notable for the following components:    POC Glucose 322 (*)     All other components within normal limits    Narrative:     Performed at:  [facility]  [address],  [place], [address] [employer]   Phone [phone]   POCT GLUCOSE - Abnormal; Notable for the following components:    POC Glucose 283 (*)     All other components within normal limits    Narrative:     Performed at:  [address], [facility] [employer]   Phone [phone], [place]    Narrative:     Aurora Su. [phone],  Previous panic on this admission - call not needed per SOP, 04/25/2021 08:18,  by [facility]  Previous panic on this admission - call not needed per SOP, 04/25/2021 08:17,  by [facility]  Previous panic on this admission - call not needed per SOP, 04/25/2021 08:16,  by [facility]  Performed at:  [address], [facility] [employer]   Phone [phone]   RENAL FUNCTION PANEL   CBC   VANCOMYCIN, RANDOM   HEMOGLOBIN A1C   POCT GLUCOSE   POCT GLUCOSE   POCT GLUCOSE   POCT GLUCOSE   POCT GLUCOSE   POCT GLUCOSE       Interpretation per the Radiologist below, if obtained/available at the time of this note:    XR HAND LEFT (MIN 3 VIEWS)   Final Result   1. Acute, subacute or old avulsion fracture on our base of the proximal   phalanx left thumb. Correlate with area of pain. 2. Peripheral vascular disease. 3. Other portions of the left hand radiograph series appear unremarkable. All other labs/imaging were within normal range or not returned as of this dictation.     EMERGENCY DEPARTMENT COURSE and DIFFERENTIAL DIAGNOSIS/MDM:   Vitals:    Vitals:    04/25/21 1158 04/25/21 1358 04/25/21 1822 04/25/21 2144   BP: (!) 155/88 119/79 (!) 145/80 (!) 146/72   Pulse: 90 108 93 74   Resp:   18 18 Temp:   97.6 °F (36.4 °C) 97.9 °F (36.6 °C)   TempSrc:   Oral Oral   SpO2: 93%  92% 96%   Weight:       Height:           Patient presents emergency department today for atraumatic hand pain. Exam concerning for possible deep-seated infection also possible septic MCP joints. Found to have leukocytosis elevated ESR. Orthopedics consulted and came to the patient's bedside and shared her concern for deep infection but at this time did not feel incision and drainage or arthrotomy necessary. Recommended keeping the hand elevated starting IV antibiotics and admission for reevaluation. X-rays were unremarkable here. MDM    CONSULTS     IP CONSULT TO ORTHOPEDIC SURGERY  IP CONSULT TO HOSPITALIST  IP CONSULT TO NEPHROLOGY  IP CONSULT TO PHARMACY  IP CONSULT TO ORTHOPEDIC SURGERY    Critical Care:   None    REASSESSMENT          PROCEDURE     Unless otherwise noted below, none     Procedures      FINAL IMPRESSION      1. Cellulitis of hand            DISPOSITION/PLAN   DISPOSITION Admitted 04/24/2021 06:31:18 PM        PATIENT REFERRED TO:  No follow-up provider specified. DISCHARGE MEDICATIONS:  Current Discharge Medication List        Controlled Substances Monitoring:     RX Monitoring 8/4/2017   Attestation The Prescription Monitoring Report for this patient was reviewed today. Periodic Controlled Substance Monitoring No signs of potential drug abuse or diversion identified.        (Please note that portions of this note were completed with a voice recognition program.  Efforts were made to edit the dictations but occasionally words are mis-transcribed.)    Linette Arceo MD (electronically signed)  Attending Emergency Physician            Bossman Cowart MD  04/25/21 9961

## 2021-04-24 NOTE — H&P
Hospital Medicine History & Physical      PCP: Agustín Nogueira MD    Date of Admission: 4/24/2021    Date of Service: Pt seen/examined on 4/24/2021 and Admitted to Inpatient with expected LOS greater than two midnights due to medical therapy. .    Chief Complaint: Left hand swelling and pain for last couple of days      History Of Present Illness:      46 y.o. male who presented to [facility] with above complaint. He has been having some pain over the left hand for last 1 week and is started to swell and pain became worse for last few days. Thus the reason he came to the emergency room. He denies any injury or bite or break. There is no skin opening. He does not have fever nausea vomiting or change in mental status. He does not have cough shortness of breath fever tingling or numbness over the extremities.     Past Medical History:          Diagnosis Date    Ambulatory dysfunction     walker for long distances, SOB with distance    Aortic stenosis     echo 2017    Arthritis     hands and hips    Asthma     Bilateral hilar adenopathy syndrome 6/3/2013    CAD (coronary artery disease)     Dr. Davie Jacobs [facility]) 04/19/2019    EF= 43%    CHF (congestive heart failure) (HCC)     Chronic pain     COPD (chronic obstructive pulmonary disease) ([address]     pulmonology Dr. Erin Mitchell    Depression     Diabetes mellitus ([address]     borderline    Difficult intravenous access     Emphysema of lung ([address]     ESRD (end stage renal disease) on dialysis ([address]     MWF    Fear of needles     Gastric ulcer     GERD (gastroesophageal reflux disease)     Heart valve problem     bicuspic valve    Hemodialysis patient ([address]     History of spinal fracture     work incident    Hx of blood clots     Bilateral lower extremities; stents in place    Hyperlipidemia     Hypertension     MI (myocardial infarction) ([address] 2019    has had 9 MIs. 2019 was the last    Neuromuscular disorder ([address] due to CVA    Numbness and tingling in left arm     from fistula    Pneumonia     PONV (postoperative nausea and vomiting)     Prolonged emergence from general anesthesia     States requires more medication than most people    Sleep apnea     Uses CPAP    Stroke ([facility]     7mm thalamic cva 2017 deficts left side, left side weakness    TIA (transient ischemic attack)     Unspecified diseases of blood and blood-forming organs        Past Surgical History:          Procedure Laterality Date    AORTIC VALVE REPLACEMENT N/A 10/15/2019    TRANSCATHETER AORTIC VALVE REPLACEMENT FEMORAL APPROACH performed by Chadwick Mina MD at [address] [place] [address] Ave Right 7/2/2019    PERITONEAL DIALYSIS CATHETER REMOVAL performed by Kaye Mercer MD at [facility] COLONOSCOPY  2/29/2015    WN    CORONARY ANGIOPLASTY WITH STENT PLACEMENT  05/26/15    CYST REMOVAL  08/14/2013    EXCISION CYSTS, NECK X2 AND ABDOMINAL benign    DIAGNOSTIC CARDIAC CATH LAB PROCEDURE      DIALYSIS FISTULA CREATION Left 10/30/2017    LEFT BRACHIAL CEPHALIC FISTULA    DIALYSIS FISTULA CREATION Left 3/27/2019    LIGATION  AV FISTULA performed by Taran Shah MD at [facility], COLON, DIAGNOSTIC      OTHER SURGICAL HISTORY  02/01/2017    laparoscopic cholecystectomy with intraoperative cholangiogram    OTHER SURGICAL HISTORY  2018    PORT PLACEMENT  - vas cath    OTHER SURGICAL HISTORY Bilateral 06/26/2018    laprascopic peritoneal dialysis catheter placement    OTHER SURGICAL HISTORY Right 09/2018    peritoneal dialysis port placed on right side of abdomen    OTHER SURGICAL HISTORY  05/28/2019    PTA/Stenting R External Iliac artery    MS LAP INSERTION TUNNELED INTRAPERITONEAL CATHETER N/A 9/21/2018    LAPAROSCOPIC PERITONEAL DIALYSIS CATHETER REPLACEMENT performed by Kaye Mercer MD at [address] [place] [address]  01/06/2016    UPPER GASTROINTESTINAL ENDOSCOPY  01/29/2017    possible candida, otherwise normal appearing    VASCULAR SURGERY  aprx 2 years ago    2 stents placed, each side of groin       Medications Prior to Admission:      Prior to Admission medications    Medication Sig Start Date End Date Taking? Authorizing Provider   methylPREDNISolone (MEDROL, JUSTIN,) 4 MG tablet Take by mouth. Take 6 tabs on day 1, 5 tabs on day 2, 4 tabs on day 3, 3 tabs on day 4, 2 tabs on day 5, 1 tab on day 6 4/14/21  Yes Virgilio Sommer APRN - CNP   oxyCODONE-acetaminophen (PERCOCET) 7.5-325 MG per tablet Take 1 tablet by mouth every 4-6 hours as needed for Pain for up to 30 days. 3/31/21 4/30/21 Yes Renuka Wooten MD   traZODone (DESYREL) 150 MG tablet TAKE (1) TABLET BY MOUTH NIGHTLY 3/31/21  Yes Renuka Wooten MD   QUEtiapine (SEROQUEL) 50 MG tablet Take 1 tablet by mouth every evening 3/25/21  Yes Renuka Wooten MD   losartan (COZAAR) 50 MG tablet TAKE 1 TABLET BY MOUTH DAILY 3/25/21  Yes Renuka Wooten MD   Continuous Blood Gluc  (DEXCOM G5 MOBILE ) ADAM Use to check blood sugar 3/15/21  Yes Renuka Wooten MD   Continuous Blood Gluc Sensor (DEXCOM G4 SENSOR) MISC Use to check blood sugar 3/15/21  Yes Renuka Wooten MD   pantoprazole (PROTONIX) 40 MG tablet TAKE (1) TABLET BY MOUTH EACH MORNING BEFORE BREAKFAST 3/9/21  Yes Renuka Wooten MD   simethicone (MYLICON) 80 MG chewable tablet Take 1 tablet by mouth every 6 hours as needed (cramping) 2/3/21  Yes Rajesh Levy MD   glucose monitoring kit (FREESTYLE) monitoring kit 1 kit by Does not apply route daily 1/8/21  Yes Renuka Wooten MD   blood glucose test strips (GLUCOSE METER TEST) strip 1 each by In Vitro route 5 times daily As needed. 1/8/21  Yes Renuka Wooten MD   nitroGLYCERIN (NITROSTAT) 0.4 MG SL tablet DISSOLVE 1 TABLET UNDER THE TONGUE AS NEEDED FOR CHEST PAIN EVERY 5 MINUTES UP TO 3 TIMES.  IF NO RELIEF CALL 911. 1/7/21  Yes Renuka Wooten MD   Insulin Pen Needle 31G X 5 MM MISC 1 each by Does not apply route 4 times daily 12/28/20  Yes Suzanne Gu APRN - CNP   hydrOXYzine (VISTARIL) 50 MG capsule TAKE 1 TO 2 CAPSULES BY MOUTH NIGHTLY 12/21/20  Yes Kimberly Cui MD   lidocaine 4 % external patch Place 1 patch onto the skin daily 12/19/20  Yes Monalisa Camp APRN - CNP   hydrALAZINE (APRESOLINE) 50 MG tablet TAKE 1/2 TABLET BY MOUTH EVERY 8 HOURS 11/24/20  Yes Destiny Tapia MD   B Complex-C-Folic Acid (VIRT-CAPS) 1 MG CAPS TK ONE C PO  QD 11/18/20  Yes Destiny Tapia MD   Continuous Blood Gluc Sensor (FREESTYLE STEPHANY 14 DAY SENSOR) MISC 1 each by Does not apply route every 14 days 11/10/20  Yes Destiny Tapia MD   insulin glargine [facility]) 100 UNIT/ML injection pen Inject 70 Units into the skin nightly 11/10/20  Yes Destiny Tapia MD   citalopram (CELEXA) 40 MG tablet TAKE (1) TABLET BY MOUTH DAILY 11/4/20  Yes Destiny Tapia MD   insulin aspart (NOVOLOG FLEXPEN) 100 UNIT/ML injection pen Inject 20 Units into the skin 3 times daily (before meals) 10/12/20  Yes Destiny Tapia MD   clopidogrel (PLAVIX) 75 MG tablet TAKE 1 TABLET BY MOUTH ONCE DAILY 10/2/20  Yes Donaldo Christian MD   pravastatin (PRAVACHOL) 80 MG tablet TAKE (1) TABLET BY MOUTH ONCE DAILY 9/29/20  Yes Donaldo Christian MD   isosorbide mononitrate (IMDUR) 30 MG extended release tablet TAKE 1 TABLET BY MOUTH EVERY DAY 9/1/20  Yes Destiny Tapia MD   torsemide (DEMADEX) 100 MG tablet Take 1-2 tablets by mouth daily 8/5/20  Yes Destiny Tapia MD   ondansetron (ZOFRAN ODT) 4 MG disintegrating tablet Take 1 tablet by mouth every 8 hours as needed for Nausea 8/5/20  Yes Destiny Tapia MD   LINZESS 145 MCG capsule TAKE 1 CAPSULE BY MOUTH EVERY MORNING BEFORE BREAKFAST 7/6/20  Yes Destiny Tapia MD   Calcium Acetate, Phos Binder, 667 MG CAPS TAKE 1 CAPSULE BY MOUTH THREE TIMES DAILY WITH MEALS 6/29/20  Yes Destiny Tapia MD   tiZANidine (ZANAFLEX) 4 MG tablet TAKE 1 TABLET BY MOUTH THREE TIMES DAILY 6/4/20  Yes Dorma Gitelman, MD   DULoxetine (CYMBALTA) 30 MG extended release capsule TAKE 1 CAPSULE BY MOUTH EVERY DAY 4/3/20  Yes JAY Anders CNP   nystatin-triamcinolone (MYCOLOG II) 027738-1.1 UNIT/GM-% cream Apply topically 2 times daily. 3/16/20  Yes Toma Soulier, APRN - CNP   albuterol (PROVENTIL) (2.5 MG/3ML) 0.083% nebulizer solution INHALE 1 VIAL VIA NEBULIZER EVERY 6 HOURS AS NEEDED FOR WHEEZING 3/10/20  Yes Dorma Gitelman, MD   nystatin (MYCOSTATIN) 898885 UNIT/GM cream Apply topically 2 times daily. 3/4/20  Yes Dorma Gitelman, MD   Insulin Syringe-Needle U-100 30G X 1/2\" 0.5 ML MISC 1 each by Does not apply route daily 3/4/20  Yes Dorma Gitelman, MD   blood glucose test strips (FREESTYLE LITE) strip Daily As needed.  8/19/19  Yes Dorma Gitelman, MD   glucose monitoring kit (FREESTYLE) monitoring kit 1 kit by Does not apply route daily 8/19/19  Yes Dorma Gitelman, MD   vitamin D (ERGOCALCIFEROL) 34216 units CAPS capsule TK 1 C PO WEEKLY 6/2/19  Yes Historical Provider, MD   flunisolide (NASALIDE) 25 MCG/ACT (0.025%) SOLN Inhale 2 sprays into the lungs every 12 hours 5/22/19  Yes Eddy Parmar MD   Tiotropium Bromide-Olodaterol (STIOLTO RESPIMAT) 2.5-2.5 MCG/ACT AERS Inhale 2 puffs into the lungs daily 5/21/19  Yes Eddy Parmar MD   Polyethylene Glycol 3350 GRAN  5/2/18  Yes Historical Provider, MD   Glucose Blood (BLOOD GLUCOSE TEST STRIPS) STRP TEST 3-4 TIMES DAILY, AS DIRECTED 4/25/18  Yes Junette Blizzard, MD   Blood Glucose Monitoring Suppl ADAM USE AS DIRECTED. 4/25/18  Yes Junette Blizzard, MD   Alcohol Swabs PADS USE AS DIRECTED 4/25/18  Yes Junette Blizzard, MD   albuterol sulfate  (90 Base) MCG/ACT inhaler Inhale 2 puffs into the lungs every 6 hours as needed for Wheezing 11/8/17  Yes Basil Parsons MD   ipratropium-albuterol (DUONEB) 0.5-2.5 (3) MG/3ML SOLN nebulizer solution Inhale 3 mLs into the lungs every 6 hours as needed for Shortness of Breath 10/15/17  Yes Porfirio Light Cathleen Nolasco MD   Lancets MISC Test daily 5/25/17  Yes Stephanie Lamar MD   calcium carbonate (TUMS) 500 MG chewable tablet Take 1 tablet by mouth 3 times daily as needed for Heartburn. Yes Historical Provider, MD   aspirin 81 MG chewable tablet Take 1 tablet by mouth daily. Patient taking differently: Take 81 mg by mouth daily Indications: stopped on 6/25 for surgery  5/14/13  Yes Charly Arias MD       Allergies:  Morphine    Social History:      The patient currently lives at home    TOBACCO:   reports that he has been smoking cigarettes. He has a 16.50 pack-year smoking history. He has never used smokeless tobacco.  ETOH:   reports previous alcohol use. E-Cigarettes/Vaping Use     Questions Responses    E-Cigarette/Vaping Use Never User    Start Date     Passive Exposure     Quit Date     Counseling Given     Comments             Family History:       Reviewed in detail and negative for DM, CAD, Cancer, CVA. Positive as follows:        Problem Relation Age of Onset    Diabetes Mother     Heart Disease Father     Kidney Disease Sister         stage 4-kidney failure    Cancer Sister     Heart Disease Sister     Obesity Sister     Cancer Sister     Heart Disease Sister     Obesity Sister     Alcohol Abuse Brother        REVIEW OF SYSTEMS COMPLETED:   Pertinent positives as noted in the HPI. All other systems reviewed and negative. PHYSICAL EXAM PERFORMED:    /76   Pulse 81   Temp 97.9 °F (36.6 °C) (Oral)   Resp 16   Ht 5' 9\" (1.753 m)   Wt 262 lb (118.8 kg)   SpO2 97%   BMI 38.69 kg/m²     General appearance:  No apparent distress, appears stated age and cooperative. Obese  HEENT:  Normal cephalic, atraumatic without obvious deformity. Pupils equal, round, and reactive to light. Extra ocular muscles intact. Conjunctivae/corneas clear. Neck: Supple, with full range of motion. No jugular venous distention. Trachea midline.  Dialysis catheter left side of the neck    Respiratory: Normal respiratory effort. Clear to auscultation, bilaterally without Rales/Wheezes/Rhonchi. Cardiovascular:  Regular rate and rhythm with normal S1/S2 without murmurs, rubs or gallops. Abdomen: Soft, non-tender, non-distended with normal bowel sounds. Musculoskeletal:  No clubbing, cyanosis or edema bilaterally. Left hand is swollen, red no skin opening movements limited over the fingers because of pain and swelling   skin: Skin color, texture, turgor normal.  No rashes or lesions. Neurologic:  Neurovascularly intact without any focal sensory/motor deficits. Cranial nerves: II-XII intact, grossly non-focal.  Psychiatric:  Alert and oriented, thought content appropriate, normal insight  Capillary Refill: Brisk,3 seconds, normal  Peripheral Pulses: +2 palpable, equal bilaterally       Labs:     Recent Labs     04/24/21  1527   WBC 13.1*   HGB 10.3*   HCT 31.1*        Recent Labs     04/24/21  1527   *   K 4.0   CL 90*   CO2 24   BUN 66*   CREATININE 5.1*   CALCIUM 8.8     No results for input(s): AST, ALT, BILIDIR, BILITOT, ALKPHOS in the last 72 hours. No results for input(s): INR in the last 72 hours. No results for input(s): Meldon Peoples in the last 72 hours. Urinalysis:      Lab Results   Component Value Date    NITRU Negative 06/05/2020    WBCUA 10-20 06/05/2020    BACTERIA 2+ 06/05/2020    RBCUA 0-2 06/05/2020    BLOODU TRACE-INTACT 06/05/2020    SPECGRAV 1.020 06/05/2020    GLUCOSEU 250 06/05/2020       Radiology:     CXR: I have reviewed the CXR with the following interpretation:   EKG:  I have reviewed the EKG with the following interpretation:    XR HAND LEFT (MIN 3 VIEWS)   Final Result   1. Acute, subacute or old avulsion fracture on our base of the proximal   phalanx left thumb. Correlate with area of pain. 2. Peripheral vascular disease. 3. Other portions of the left hand radiograph series appear unremarkable.              ASSESSMENT:    Active Hospital Problems

## 2021-04-25 LAB
ANION GAP SERPL CALCULATED.3IONS-SCNC: 16 MMOL/L (ref 3–16)
BASOPHILS ABSOLUTE: 0.1 K/UL (ref 0–0.2)
BASOPHILS RELATIVE PERCENT: 0.8 %
BUN BLDV-MCNC: 77 MG/DL (ref 7–20)
C-REACTIVE PROTEIN: 36.9 MG/L (ref 0–5.1)
CALCIUM SERPL-MCNC: 8.8 MG/DL (ref 8.3–10.6)
CHLORIDE BLD-SCNC: 89 MMOL/L (ref 99–110)
CO2: 25 MMOL/L (ref 21–32)
CREAT SERPL-MCNC: 6.2 MG/DL (ref 0.9–1.3)
EOSINOPHILS ABSOLUTE: 0.6 K/UL (ref 0–0.6)
EOSINOPHILS RELATIVE PERCENT: 5.4 %
GFR AFRICAN AMERICAN: 12
GFR NON-AFRICAN AMERICAN: 10
GLUCOSE BLD-MCNC: 279 MG/DL (ref 70–99)
GLUCOSE BLD-MCNC: 283 MG/DL (ref 70–99)
GLUCOSE BLD-MCNC: 293 MG/DL (ref 70–99)
GLUCOSE BLD-MCNC: 302 MG/DL (ref 70–99)
GLUCOSE BLD-MCNC: 322 MG/DL (ref 70–99)
GLUCOSE BLD-MCNC: 322 MG/DL (ref 70–99)
HCT VFR BLD CALC: 30.4 % (ref 40.5–52.5)
HEMOGLOBIN: 10.2 G/DL (ref 13.5–17.5)
LYMPHOCYTES ABSOLUTE: 1.1 K/UL (ref 1–5.1)
LYMPHOCYTES RELATIVE PERCENT: 10.3 %
MCH RBC QN AUTO: 30.6 PG (ref 26–34)
MCHC RBC AUTO-ENTMCNC: 33.6 G/DL (ref 31–36)
MCV RBC AUTO: 91.1 FL (ref 80–100)
MONOCYTES ABSOLUTE: 0.7 K/UL (ref 0–1.3)
MONOCYTES RELATIVE PERCENT: 6.7 %
NEUTROPHILS ABSOLUTE: 8.2 K/UL (ref 1.7–7.7)
NEUTROPHILS RELATIVE PERCENT: 76.8 %
PDW BLD-RTO: 15 % (ref 12.4–15.4)
PERFORMED ON: ABNORMAL
PLATELET # BLD: 177 K/UL (ref 135–450)
PMV BLD AUTO: 8.3 FL (ref 5–10.5)
POTASSIUM REFLEX MAGNESIUM: 3.8 MMOL/L (ref 3.5–5.1)
RBC # BLD: 3.33 M/UL (ref 4.2–5.9)
SODIUM BLD-SCNC: 130 MMOL/L (ref 136–145)
VANCOMYCIN RANDOM: 19.7 UG/ML
WBC # BLD: 10.7 K/UL (ref 4–11)

## 2021-04-25 PROCEDURE — 85025 COMPLETE CBC W/AUTO DIFF WBC: CPT

## 2021-04-25 PROCEDURE — 80202 ASSAY OF VANCOMYCIN: CPT

## 2021-04-25 PROCEDURE — 36415 COLL VENOUS BLD VENIPUNCTURE: CPT

## 2021-04-25 PROCEDURE — 2700000000 HC OXYGEN THERAPY PER DAY

## 2021-04-25 PROCEDURE — 6370000000 HC RX 637 (ALT 250 FOR IP): Performed by: NURSE PRACTITIONER

## 2021-04-25 PROCEDURE — 2500000003 HC RX 250 WO HCPCS: Performed by: INTERNAL MEDICINE

## 2021-04-25 PROCEDURE — 2580000003 HC RX 258: Performed by: INTERNAL MEDICINE

## 2021-04-25 PROCEDURE — 80048 BASIC METABOLIC PNL TOTAL CA: CPT

## 2021-04-25 PROCEDURE — 94660 CPAP INITIATION&MGMT: CPT

## 2021-04-25 PROCEDURE — 6370000000 HC RX 637 (ALT 250 FOR IP): Performed by: REGISTERED NURSE

## 2021-04-25 PROCEDURE — 6370000000 HC RX 637 (ALT 250 FOR IP): Performed by: INTERNAL MEDICINE

## 2021-04-25 PROCEDURE — 6360000002 HC RX W HCPCS: Performed by: INTERNAL MEDICINE

## 2021-04-25 PROCEDURE — 99232 SBSQ HOSP IP/OBS MODERATE 35: CPT | Performed by: PHYSICIAN ASSISTANT

## 2021-04-25 PROCEDURE — 94761 N-INVAS EAR/PLS OXIMETRY MLT: CPT

## 2021-04-25 PROCEDURE — 6360000002 HC RX W HCPCS: Performed by: REGISTERED NURSE

## 2021-04-25 PROCEDURE — 1200000000 HC SEMI PRIVATE

## 2021-04-25 PROCEDURE — 94640 AIRWAY INHALATION TREATMENT: CPT

## 2021-04-25 RX ORDER — HYDROMORPHONE HYDROCHLORIDE 2 MG/1
0.5 TABLET ORAL ONCE
Status: DISCONTINUED | OUTPATIENT
Start: 2021-04-25 | End: 2021-04-25

## 2021-04-25 RX ORDER — OXYCODONE AND ACETAMINOPHEN 7.5; 325 MG/1; MG/1
1 TABLET ORAL EVERY 4 HOURS PRN
Status: DISCONTINUED | OUTPATIENT
Start: 2021-04-25 | End: 2021-04-27 | Stop reason: HOSPADM

## 2021-04-25 RX ORDER — CALCIUM CARBONATE 200(500)MG
500 TABLET,CHEWABLE ORAL 3 TIMES DAILY PRN
Status: DISCONTINUED | OUTPATIENT
Start: 2021-04-25 | End: 2021-04-27 | Stop reason: HOSPADM

## 2021-04-25 RX ADMIN — GLYCOPYRROLATE AND FORMOTEROL FUMARATE 2 PUFF: 9; 4.8 AEROSOL, METERED RESPIRATORY (INHALATION) at 19:50

## 2021-04-25 RX ADMIN — OXYCODONE HYDROCHLORIDE AND ACETAMINOPHEN 1 TABLET: 7.5; 325 TABLET ORAL at 17:24

## 2021-04-25 RX ADMIN — PROMETHAZINE HYDROCHLORIDE 12.5 MG: 25 TABLET ORAL at 13:53

## 2021-04-25 RX ADMIN — DULOXETINE HYDROCHLORIDE 30 MG: 30 CAPSULE, DELAYED RELEASE ORAL at 11:59

## 2021-04-25 RX ADMIN — INSULIN LISPRO 12 UNITS: 100 INJECTION, SOLUTION INTRAVENOUS; SUBCUTANEOUS at 17:25

## 2021-04-25 RX ADMIN — HYDROMORPHONE HYDROCHLORIDE 0.5 MG: 1 INJECTION, SOLUTION INTRAMUSCULAR; INTRAVENOUS; SUBCUTANEOUS at 12:28

## 2021-04-25 RX ADMIN — SODIUM CHLORIDE, PRESERVATIVE FREE 10 ML: 5 INJECTION INTRAVENOUS at 08:48

## 2021-04-25 RX ADMIN — INSULIN LISPRO 5 UNITS: 100 INJECTION, SOLUTION INTRAVENOUS; SUBCUTANEOUS at 21:51

## 2021-04-25 RX ADMIN — HYDRALAZINE HYDROCHLORIDE 25 MG: 25 TABLET, FILM COATED ORAL at 05:20

## 2021-04-25 RX ADMIN — PANTOPRAZOLE SODIUM 40 MG: 40 TABLET, DELAYED RELEASE ORAL at 05:20

## 2021-04-25 RX ADMIN — GLYCOPYRROLATE AND FORMOTEROL FUMARATE 2 PUFF: 9; 4.8 AEROSOL, METERED RESPIRATORY (INHALATION) at 08:40

## 2021-04-25 RX ADMIN — LOSARTAN POTASSIUM 50 MG: 25 TABLET, FILM COATED ORAL at 11:59

## 2021-04-25 RX ADMIN — PIPERACILLIN AND TAZOBACTAM 2250 MG: 2; .25 INJECTION, POWDER, LYOPHILIZED, FOR SOLUTION INTRAVENOUS at 03:56

## 2021-04-25 RX ADMIN — CALCIUM ACETATE 667 MG: 667 CAPSULE ORAL at 13:58

## 2021-04-25 RX ADMIN — HYDROMORPHONE HYDROCHLORIDE 0.5 MG: 1 INJECTION, SOLUTION INTRAMUSCULAR; INTRAVENOUS; SUBCUTANEOUS at 13:54

## 2021-04-25 RX ADMIN — CALCIUM ACETATE 667 MG: 667 CAPSULE ORAL at 22:53

## 2021-04-25 RX ADMIN — INSULIN GLARGINE 30 UNITS: 100 INJECTION, SOLUTION SUBCUTANEOUS at 21:52

## 2021-04-25 RX ADMIN — INSULIN LISPRO 9 UNITS: 100 INJECTION, SOLUTION INTRAVENOUS; SUBCUTANEOUS at 12:06

## 2021-04-25 RX ADMIN — HYDROMORPHONE HYDROCHLORIDE 1 MG: 1 INJECTION, SOLUTION INTRAMUSCULAR; INTRAVENOUS; SUBCUTANEOUS at 22:53

## 2021-04-25 RX ADMIN — OXYCODONE HYDROCHLORIDE AND ACETAMINOPHEN 1 TABLET: 7.5; 325 TABLET ORAL at 21:49

## 2021-04-25 RX ADMIN — HYDRALAZINE HYDROCHLORIDE 25 MG: 25 TABLET, FILM COATED ORAL at 13:58

## 2021-04-25 RX ADMIN — OXYCODONE HYDROCHLORIDE AND ACETAMINOPHEN 1 TABLET: 7.5; 325 TABLET ORAL at 10:02

## 2021-04-25 RX ADMIN — CITALOPRAM HYDROBROMIDE 40 MG: 20 TABLET ORAL at 11:59

## 2021-04-25 RX ADMIN — HEPARIN SODIUM 5000 UNITS: 5000 INJECTION INTRAVENOUS; SUBCUTANEOUS at 13:59

## 2021-04-25 RX ADMIN — OXYCODONE HYDROCHLORIDE AND ACETAMINOPHEN 1 TABLET: 7.5; 325 TABLET ORAL at 05:20

## 2021-04-25 RX ADMIN — PIPERACILLIN AND TAZOBACTAM 2250 MG: 2; .25 INJECTION, POWDER, LYOPHILIZED, FOR SOLUTION INTRAVENOUS at 21:49

## 2021-04-25 RX ADMIN — INSULIN GLARGINE 30 UNITS: 100 INJECTION, SOLUTION SUBCUTANEOUS at 00:00

## 2021-04-25 RX ADMIN — ISOSORBIDE MONONITRATE 30 MG: 30 TABLET, EXTENDED RELEASE ORAL at 11:59

## 2021-04-25 RX ADMIN — SODIUM CHLORIDE, PRESERVATIVE FREE 10 ML: 5 INJECTION INTRAVENOUS at 21:50

## 2021-04-25 RX ADMIN — ANTACID TABLETS 500 MG: 500 TABLET, CHEWABLE ORAL at 18:31

## 2021-04-25 RX ADMIN — PRAVASTATIN SODIUM 40 MG: 40 TABLET ORAL at 21:49

## 2021-04-25 RX ADMIN — HEPARIN SODIUM 5000 UNITS: 5000 INJECTION INTRAVENOUS; SUBCUTANEOUS at 21:51

## 2021-04-25 RX ADMIN — HYDROMORPHONE HYDROCHLORIDE 1 MG: 1 INJECTION, SOLUTION INTRAMUSCULAR; INTRAVENOUS; SUBCUTANEOUS at 18:32

## 2021-04-25 RX ADMIN — HYDRALAZINE HYDROCHLORIDE 25 MG: 25 TABLET, FILM COATED ORAL at 21:49

## 2021-04-25 RX ADMIN — PIPERACILLIN AND TAZOBACTAM 2250 MG: 2; .25 INJECTION, POWDER, LYOPHILIZED, FOR SOLUTION INTRAVENOUS at 12:02

## 2021-04-25 RX ADMIN — QUETIAPINE FUMARATE 50 MG: 25 TABLET ORAL at 21:58

## 2021-04-25 RX ADMIN — ASPIRIN 81 MG: 81 TABLET, CHEWABLE ORAL at 11:59

## 2021-04-25 ASSESSMENT — PAIN SCALES - GENERAL
PAINLEVEL_OUTOF10: 9
PAINLEVEL_OUTOF10: 10
PAINLEVEL_OUTOF10: 8
PAINLEVEL_OUTOF10: 10
PAINLEVEL_OUTOF10: 9

## 2021-04-25 ASSESSMENT — PAIN DESCRIPTION - LOCATION: LOCATION: ARM;BACK;HIP

## 2021-04-25 ASSESSMENT — PAIN DESCRIPTION - ORIENTATION: ORIENTATION: LEFT;MID;OTHER (COMMENT)

## 2021-04-25 NOTE — PROGRESS NOTES
Chief Complaint    Hand Pain (left started yesterday  unaware of injury/hurts in fingers/swelling)      History of Present Illness:  Angel Calero is a 46 y.o. male who presented to the ER yesterday and was seen by Dr. Yasmine Espinoza.  Admitted for likely deep deep space hand infection. No real changes from last night. Continued pain but no fever. Medical History:  Patient's medications, allergies, past medical, surgical, social and family histories were reviewed and updated as appropriate. Vital Signs:  BP (!) 159/81   Pulse 81   Temp 97.8 °F (36.6 °C) (Oral)   Resp 18   Ht 5' 9\" (1.753 m)   Wt 262 lb (118.8 kg)   SpO2 97%   BMI 38.69 kg/m²     General Exam:   Constitutional: Patient is adequately groomed with no evidence of malnutrition  DTRs: Deep tendon reflexes are intact  Mental Status: The patient is oriented to time, place and person. The patient's mood and affect are appropriate. Lymphatic: The lymphatic examination bilaterally reveals all areas to be without enlargement or induration. Vascular: Examination reveals no swelling or calf tenderness. Peripheral pulses are palpable and 2+. Neurological: The patient has good coordination. There is no weakness or sensory deficit. Hand Examination:    Inspection:  Mild swelling golden hand. Soft. No tightness. No erythema    Palpation:  Tenderness over distal golden hand second and thrid web space    Range of Motion:  Near full ROM limited by pain     Strength:  5/5  and pinch strength    Skin: There are no rashes, ulcerations or lesions. Gait: normal    Reflex +2    NVI    Additional Comments:       Additional Examinations:         Left Upper Extremity: Examination of the left upper extremity does not show any tenderness, deformity or injury. Range of motion is unremarkable. There is no gross instability. There are no rashes, ulcerations or lesions.   Strength and tone are normal.              Assessment :  Left hand iinfection      Office Procedures:    Treatment Plan:  Continue IV ABX. Continue to elevate. NPO after midnight. Possible I&d in the morning.

## 2021-04-25 NOTE — PROGRESS NOTES
RESPIRATORY THERAPY ASSESSMENT    Name:  Juan Ramon Brown Record Number:  [id number]  Age: 46 y.o. Gender: male  : 1970  Today's Date:  2021  Room:  [id number]    Assessment     Is the patient being admitted for a COPD or Asthma exacerbation? No   (If yes the patient will be seen every 4 hours for the first 24 hours and then reassessed)    Patient Admission Diagnosis      Allergies  Allergies   Allergen Reactions    Morphine Nausea And Vomiting       Minimum Predicted Vital Capacity:     1061          Actual Vital Capacity:      250-500              Pulmonary History:COPD, Asthma and CHF/Pulmonary Edema, ZAINAB  Home Oxygen Therapy:  room air  Home Respiratory Therapy:Albuterol, Albuterol/Ipratropium Bromide HHN and Tiotropium bromide/Olodaterol   Current Respiratory Therapy:  Albuterol HHN PRN          Respiratory Severity Index(RSI)   Patients with orders for inhalation medications, oxygen, or any therapeutic treatment modality will be placed on Respiratory Protocol. They will be assessed with the first treatment and at least every 72 hours thereafter. The following severity scale will be used to determine frequency of treatment intervention.     Smoking History: Pulmonary Disease or Smoking History, Greater than 15 pack year = 2    Social History  Social History     Tobacco Use    Smoking status: Current Every Day Smoker     Packs/day: 0.50     Years: 33.00     Pack years: 16.50     Types: Cigarettes     Last attempt to quit: 2020     Years since quittin.9    Smokeless tobacco: Never Used   Substance Use Topics    Alcohol use: Not Currently     Alcohol/week: 0.0 standard drinks     Comment: occ    Drug use: No       Recent Surgical History: None = 0  Past Surgical History  Past Surgical History:   Procedure Laterality Date    AORTIC VALVE REPLACEMENT N/A 10/15/2019    TRANSCATHETER AORTIC VALVE REPLACEMENT FEMORAL APPROACH performed by Nini Márquez MD at [place] [address] CATHETER REMOVAL Right 7/2/2019    PERITONEAL DIALYSIS CATHETER REMOVAL performed by Caitlyn Keyes MD at [facility] [address]  2/29/2015    WNL    CORONARY ANGIOPLASTY WITH STENT PLACEMENT  05/26/15    CYST REMOVAL  08/14/2013    EXCISION CYSTS, NECK X2 AND ABDOMINAL benign    DIAGNOSTIC CARDIAC CATH LAB PROCEDURE      DIALYSIS FISTULA CREATION Left 10/30/2017    LEFT BRACHIAL CEPHALIC FISTULA    DIALYSIS FISTULA CREATION Left 3/27/2019    LIGATION  AV FISTULA performed by Marden Rubinstein, MD at [facility]. [address], COLON, DIAGNOSTIC      OTHER SURGICAL HISTORY  02/01/2017    laparoscopic cholecystectomy with intraoperative cholangiogram    OTHER SURGICAL HISTORY  2018    PORT PLACEMENT  - vas cath    OTHER SURGICAL HISTORY Bilateral 06/26/2018    laprascopic peritoneal dialysis catheter placement    OTHER SURGICAL HISTORY Right 09/2018    peritoneal dialysis port placed on right side of abdomen    OTHER SURGICAL HISTORY  05/28/2019    PTA/Stenting R External Iliac artery    MA LAP INSERTION TUNNELED INTRAPERITONEAL CATHETER N/A 9/21/2018    LAPAROSCOPIC PERITONEAL DIALYSIS CATHETER REPLACEMENT performed by Caitlyn Keyes MD at [place] [address]  01/06/2016    UPPER GASTROINTESTINAL ENDOSCOPY  01/29/2017    possible candida, otherwise normal appearing    VASCULAR SURGERY  aprx 2 years ago    2 stents placed, each side of groin       Level of Consciousness: Alert, Oriented, and Cooperative = 0    Level of Activity: Walking unassisted = 0    Respiratory Pattern: Regular Pattern; RR 8-20 = 0    Breath Sounds: Diminshed bilaterally and/or crackles = 2    Sputum   ,  ,    Cough: Strong, spontaneous, non-productive = 0    Vital Signs   /66   Pulse 76   Temp 98.5 °F (36.9 °C) (Oral)   Resp 16   Ht 5' 9\" (1.753 m)   Wt 262 lb (118.8 kg)   SpO2 99%   BMI 38.69 kg/m²   SPO2 (COPD values may differ): Greater than or equal to 92% on room air = 0    Peak Flow (asthma only): not applicable = 0    RSI: 0-4 = See once and convert to home regimen or discontinue        Plan       Goals: medication delivery, mobilize retained secretions, volume expansion and improve oxygenation    Patient/caregiver was educated on the proper method of use for Respiratory Care Devices:  Yes      Level of patient/caregiver understanding able to:   ? Verbalize understanding   ? Demonstrate understanding       ? Teach back        ? Needs reinforcement       ? No available caregiver               ? Other:     Response to education:  Good     Is patient being placed on Home Treatment Regimen? Yes     Does the patient have everything they need prior to discharge? NA     Comments: Evaluated pt and reviewed chart. Plan of Care: Albuterol HHN PRN    Electronically signed by Sehrita Chambers RCP on 4/24/2021 at 10:07 PM    Respiratory Protocol Guidelines     1. Assessment and treatment by Respiratory Therapy will be initiated for medication and therapeutic interventions upon initiation of aerosolized medication. 2. Physician will be contacted for respiratory rate (RR) greater than 35 breaths per minute. Therapy will be held for heart rate (HR) greater than 140 beats per minute, pending direction from physician. 3. Bronchodilators will be administered via Metered Dose Inhaler (MDI) with spacer when the following criteria are met:  a. Alert and cooperative     b. HR < 140 bpm  c. RR < 30 bpm                d. Can demonstrate a 2-3 second inspiratory hold  4. Bronchodilators will be administered via Hand Held Nebulizer [facility]) to patients when ANY of the following criteria are met  a. Incognizant or uncooperative          b. Patients treated with HHN at Home        c. Unable to demonstrate proper use of MDI with spacer     d. RR > 30 bpm   5.  Bronchodilators will be delivered via Metered Dose Inhaler (MDI), HHN, Aerogen to intubated patients on mechanical

## 2021-04-25 NOTE — PROGRESS NOTES
4 Eyes Skin Assessment     The patient is being assess for   Admission    I agree that 2 RN's have performed a thorough Head to Toe Skin Assessment on the patient. ALL assessment sites listed below have been assessed. Areas assessed by both nurses:   [x]   Head, Face, and Ears   [x]   Shoulders, Back, and Chest, Abdomen  [x]   Arms, Elbows, and Hands   [x]   Coccyx, Sacrum, and Ischium  [x]   Legs, Feet, and Heels        Scattered scarring on abdoment-   Redness/warmth and swelling noted to Right hand    **SHARE this note so that the co-signing nurse is able to place an eSignature**    Co-signer eSignature: Electronically signed by Dayami Villarreal RN on 4/25/21 at 6:47 AM EDT    Does the Patient have Skin Breakdown?   No          Isaac Prevention initiated:  No   Wound Care Orders initiated:  No      [facility] nurse consulted for Pressure Injury (Stage 3,4, Unstageable, DTI, NWPT, Complex wounds)and New or Established Ostomies:  No      Primary Nurse eSignature: Electronically signed by Gemma Chau RN on 4/25/21 at 1:51 AM EDT

## 2021-04-25 NOTE — CONSULTS
medication than most people    Sleep apnea     Uses CPAP    Stroke ([facility] [address]     7mm thalamic cva 2017 deficts left side, left side weakness    TIA (transient ischemic attack)     Unspecified diseases of blood and blood-forming organs        Past Surgical History:        Procedure Laterality Date    AORTIC VALVE REPLACEMENT N/A 10/15/2019    TRANSCATHETER AORTIC VALVE REPLACEMENT FEMORAL APPROACH performed by Tiffanie Welsh MD at [address] [place] [address] Ave Right 7/2/2019    PERITONEAL DIALYSIS CATHETER REMOVAL performed by Jean Pierre Aguayo MD at [facility] COLONOSCOPY  2/29/2015    WN    CORONARY ANGIOPLASTY WITH STENT PLACEMENT  05/26/15    CYST REMOVAL  08/14/2013    EXCISION CYSTS, NECK X2 AND ABDOMINAL benign    DIAGNOSTIC CARDIAC CATH LAB PROCEDURE      DIALYSIS FISTULA CREATION Left 10/30/2017    LEFT BRACHIAL CEPHALIC FISTULA    DIALYSIS FISTULA CREATION Left 3/27/2019    LIGATION  AV FISTULA performed by Shital Benavidez MD at [facility], COLON, DIAGNOSTIC      OTHER SURGICAL HISTORY  02/01/2017    laparoscopic cholecystectomy with intraoperative cholangiogram    OTHER SURGICAL HISTORY  2018    PORT PLACEMENT  - vas cath    OTHER SURGICAL HISTORY Bilateral 06/26/2018    laprascopic peritoneal dialysis catheter placement    OTHER SURGICAL HISTORY Right 09/2018    peritoneal dialysis port placed on right side of abdomen    OTHER SURGICAL HISTORY  05/28/2019    PTA/Stenting R External Iliac artery    CA LAP INSERTION TUNNELED INTRAPERITONEAL CATHETER N/A 9/21/2018    LAPAROSCOPIC PERITONEAL DIALYSIS CATHETER REPLACEMENT performed by Jean Pierre Aguayo MD at [facility]  01/06/2016    UPPER GASTROINTESTINAL ENDOSCOPY  01/29/2017    possible candida, otherwise normal appearing    VASCULAR SURGERY  aprx 2 years ago    2 stents placed, each side of groin       Home Medications:    No current facility-administered medications on file prior to encounter. Current Outpatient Medications on File Prior to Encounter   Medication Sig Dispense Refill    methylPREDNISolone (MEDROL, JUSTIN,) 4 MG tablet Take by mouth. Take 6 tabs on day 1, 5 tabs on day 2, 4 tabs on day 3, 3 tabs on day 4, 2 tabs on day 5, 1 tab on day 6 21 tablet 0    oxyCODONE-acetaminophen (PERCOCET) 7.5-325 MG per tablet Take 1 tablet by mouth every 4-6 hours as needed for Pain for up to 30 days. 150 tablet 0    traZODone (DESYREL) 150 MG tablet TAKE (1) TABLET BY MOUTH NIGHTLY 30 tablet 10    QUEtiapine (SEROQUEL) 50 MG tablet Take 1 tablet by mouth every evening 30 tablet 5    losartan (COZAAR) 50 MG tablet TAKE 1 TABLET BY MOUTH DAILY 30 tablet 10    Continuous Blood Gluc  (DEXCOM G5 MOBILE ) ADAM Use to check blood sugar 1 Device 0    Continuous Blood Gluc Sensor (DEXCOM G4 SENSOR) MISC Use to check blood sugar 6 each 3    pantoprazole (PROTONIX) 40 MG tablet TAKE (1) TABLET BY MOUTH EACH MORNING BEFORE BREAKFAST 90 tablet 1    simethicone (MYLICON) 80 MG chewable tablet Take 1 tablet by mouth every 6 hours as needed (cramping) 15 tablet 0    glucose monitoring kit (FREESTYLE) monitoring kit 1 kit by Does not apply route daily 1 kit 0    blood glucose test strips (GLUCOSE METER TEST) strip 1 each by In Vitro route 5 times daily As needed. 100 each 3    nitroGLYCERIN (NITROSTAT) 0.4 MG SL tablet DISSOLVE 1 TABLET UNDER THE TONGUE AS NEEDED FOR CHEST PAIN EVERY 5 MINUTES UP TO 3 TIMES.  IF NO RELIEF CALL 911. 25 tablet 10    Insulin Pen Needle 31G X 5 MM MISC 1 each by Does not apply route 4 times daily 300 each 3    hydrOXYzine (VISTARIL) 50 MG capsule TAKE 1 TO 2 CAPSULES BY MOUTH NIGHTLY 60 capsule 5    lidocaine 4 % external patch Place 1 patch onto the skin daily 30 patch 0    hydrALAZINE (APRESOLINE) 50 MG tablet TAKE 1/2 TABLET BY MOUTH EVERY 8 HOURS 90 tablet 2    B Complex-C-Folic Acid (VIRT-CAPS) 1 MG CAPS TK ONE C PO  QD 90 capsule 1    Continuous Blood Gluc Sensor (FREESTYLE STEPHANY 14 DAY SENSOR) MISC 1 each by Does not apply route every 14 days 6 each 3    insulin glargine (BASAGLAR KWIKPEN) 100 UNIT/ML injection pen Inject 70 Units into the skin nightly 15 mL 5    citalopram (CELEXA) 40 MG tablet TAKE (1) TABLET BY MOUTH DAILY 30 tablet 10    insulin aspart (NOVOLOG FLEXPEN) 100 UNIT/ML injection pen Inject 20 Units into the skin 3 times daily (before meals) 15 pen 5    clopidogrel (PLAVIX) 75 MG tablet TAKE 1 TABLET BY MOUTH ONCE DAILY 90 tablet 3    pravastatin (PRAVACHOL) 80 MG tablet TAKE (1) TABLET BY MOUTH ONCE DAILY 90 tablet 3    isosorbide mononitrate (IMDUR) 30 MG extended release tablet TAKE 1 TABLET BY MOUTH EVERY DAY 90 tablet 10    torsemide (DEMADEX) 100 MG tablet Take 1-2 tablets by mouth daily 180 tablet 3    ondansetron (ZOFRAN ODT) 4 MG disintegrating tablet Take 1 tablet by mouth every 8 hours as needed for Nausea 60 tablet 0    LINZESS 145 MCG capsule TAKE 1 CAPSULE BY MOUTH EVERY MORNING BEFORE BREAKFAST 30 capsule 10    Calcium Acetate, Phos Binder, 667 MG CAPS TAKE 1 CAPSULE BY MOUTH THREE TIMES DAILY WITH MEALS 90 capsule 3    tiZANidine (ZANAFLEX) 4 MG tablet TAKE 1 TABLET BY MOUTH THREE TIMES DAILY 90 tablet 10    DULoxetine (CYMBALTA) 30 MG extended release capsule TAKE 1 CAPSULE BY MOUTH EVERY DAY 90 capsule 10    nystatin-triamcinolone (MYCOLOG II) 942034-7.1 UNIT/GM-% cream Apply topically 2 times daily. 60 g 2    albuterol (PROVENTIL) (2.5 MG/3ML) 0.083% nebulizer solution INHALE 1 VIAL VIA NEBULIZER EVERY 6 HOURS AS NEEDED FOR WHEEZING 300 mL 10    nystatin (MYCOSTATIN) 470513 UNIT/GM cream Apply topically 2 times daily. 60 g 3    Insulin Syringe-Needle U-100 30G X 1/2\" 0.5 ML MISC 1 each by Does not apply route daily 100 each 3    blood glucose test strips (FREESTYLE LITE) strip Daily As needed.  100 strip 3    glucose monitoring kit (FREESTYLE) monitoring kit 1 kit by Does not apply route daily 1 kit 0    vitamin D (ERGOCALCIFEROL) 36543 units CAPS capsule TK 1 C PO WEEKLY  11    flunisolide (NASALIDE) 25 MCG/ACT (0.025%) SOLN Inhale 2 sprays into the lungs every 12 hours 1 Bottle 5    Tiotropium Bromide-Olodaterol (STIOLTO RESPIMAT) 2.5-2.5 MCG/ACT AERS Inhale 2 puffs into the lungs daily 2 Inhaler 0    Polyethylene Glycol 3350 GRAN       Glucose Blood (BLOOD GLUCOSE TEST STRIPS) STRP TEST 3-4 TIMES DAILY, AS DIRECTED 100 strip 3    Blood Glucose Monitoring Suppl ADAM USE AS DIRECTED. 1 Device 0    Alcohol Swabs PADS USE AS DIRECTED 300 each 3    albuterol sulfate  (90 Base) MCG/ACT inhaler Inhale 2 puffs into the lungs every 6 hours as needed for Wheezing 1 Inhaler 3    ipratropium-albuterol (DUONEB) 0.5-2.5 (3) MG/3ML SOLN nebulizer solution Inhale 3 mLs into the lungs every 6 hours as needed for Shortness of Breath 360 mL 1    Lancets MISC Test daily 100 each 3    calcium carbonate (TUMS) 500 MG chewable tablet Take 1 tablet by mouth 3 times daily as needed for Heartburn.  aspirin 81 MG chewable tablet Take 1 tablet by mouth daily.  (Patient taking differently: Take 81 mg by mouth daily Indications: stopped on  for surgery ) 30 tablet 2       Allergies:  Morphine    Social History:    Social History     Socioeconomic History    Marital status:      Spouse name: Not on file    Number of children: Not on file    Years of education: Not on file    Highest education level: Not on file   Occupational History    Not on file   Social Needs    Financial resource strain: Not on file    Food insecurity     Worry: Not on file     Inability: Not on file   [employer] needs     Medical: Not on file     Non-medical: Not on file   Tobacco Use    Smoking status: Current Every Day Smoker     Packs/day: 0.50     Years: 33.00     Pack years: 16.50     Types: Cigarettes     Last attempt to quit: 2020     Years since quittin.9  Smokeless tobacco: Never Used   Substance and Sexual Activity    Alcohol use: Not Currently     Alcohol/week: 0.0 standard drinks     Comment: occ    Drug use: No    Sexual activity: Yes     Partners: Female     Comment:    Lifestyle    Physical activity     Days per week: Not on file     Minutes per session: Not on file    Stress: Not on file   Relationships    Social connections     Talks on phone: Not on file     Gets together: Not on file     Attends Evangelical service: Not on file     Active member of club or organization: Not on file     Attends meetings of clubs or organizations: Not on file     Relationship status: Not on file    Intimate partner violence     Fear of current or ex partner: Not on file     Emotionally abused: Not on file     Physically abused: Not on file     Forced sexual activity: Not on file   Other Topics Concern    Not on file   Social History Narrative    Not on file       Family History:   Family History   Problem Relation Age of Onset    Diabetes Mother     Heart Disease Father     Kidney Disease Sister         stage 4-kidney failure    Cancer Sister     Heart Disease Sister     Obesity Sister     Cancer Sister     Heart Disease Sister     Obesity Sister     Alcohol Abuse Brother        Review of Systems:   Pertinent positives stated above in HPI. All other 10 systems were reviewed and were negative.      Physical exam:   Constitutional:  VITALS:  BP (!) 159/81   Pulse 81   Temp 97.8 °F (36.6 °C) (Oral)   Resp 18   Ht 5' 9\" (1.753 m)   Wt 262 lb (118.8 kg)   SpO2 97%   BMI 38.69 kg/m²   Gen: alert, awake, nad  Skin: no rash, turgor wnl  Heent:  eomi, mmm  Neck: no bruits or jvd noted, thyroid normal  Cardiovascular:  S1, S2 without m/r/g; no lower extremity edema  Respiratory: CTA B without w/r/r; respiratory effort normal  Abdomen:  +bs, soft, nt, nd, no hepatosplenomegaly  Neuro/Psy: AAoriented times 3 ; moves all 4 ext  Musculoskeletal:  Rom,

## 2021-04-25 NOTE — PROGRESS NOTES
Hospitalist Progress Note      PCP: Bowen Shaver MD    Date of Admission: 4/24/2021    Chief Complaint: Left hand swelling and pain for last couple of days    Hospital Course:   \"44 y.o. male who presented to [facility] with above complaint. He has been having some pain over the left hand for last 1 week and is started to swell and pain became worse for last few days. Thus the reason he came to the emergency room. He denies any injury or bite or break. There is no skin opening. He does not have fever nausea vomiting or change in mental status. He does not have cough shortness of breath fever tingling or numbness over the extremities. \"    Subjective:   Pt is on RA. Afebrile. VSS. Complains of left hand pain.     Medications:  Reviewed    Infusion Medications    sodium chloride       Scheduled Medications    insulin lispro  0-18 Units Subcutaneous TID WC    insulin lispro  0-9 Units Subcutaneous Nightly    aspirin  81 mg Oral Daily    Calcium Acetate (Phos Binder)  667 mg Oral TID    citalopram  40 mg Oral Daily    DULoxetine  30 mg Oral Daily    hydrALAZINE  25 mg Oral 3 times per day    insulin glargine  30 Units Subcutaneous Nightly    isosorbide mononitrate  30 mg Oral Daily    linaclotide  145 mcg Oral QAM AC    losartan  50 mg Oral Daily    pantoprazole  40 mg Oral QAM AC    pravastatin  40 mg Oral Nightly    QUEtiapine  50 mg Oral QPM    glycopyrrolate-formoterol  2 puff Inhalation BID    sodium chloride flush  5-40 mL Intravenous 2 times per day    heparin (porcine)  5,000 Units Subcutaneous 3 times per day    piperacillin-tazobactam  2,250 mg Intravenous Q8H    vancomycin (VANCOCIN) intermittent dosing (placeholder)   Other RX Placeholder     PRN Meds: HYDROmorphone, albuterol, sodium chloride flush, sodium chloride, promethazine **OR** ondansetron, polyethylene glycol, acetaminophen **OR** acetaminophen, oxyCODONE-acetaminophen    No intake or output data in the 24 hours ending 04/25/21 1511    Physical Exam Performed:    /79   Pulse 108   Temp 97.8 °F (36.6 °C) (Oral)   Resp 18   Ht 5' 9\" (1.753 m)   Wt 262 lb (118.8 kg)   SpO2 93%   BMI 38.69 kg/m²     General appearance: No apparent distress, appears stated age and cooperative. HEENT: Pupils equal, round, and reactive to light. Conjunctivae/corneas clear. Neck: Supple, with full range of motion. No jugular venous distention. Trachea midline. Respiratory:  Normal respiratory effort. Clear to auscultation, bilaterally without Rales/Wheezes/Rhonchi. Cardiovascular: Regular rate and rhythm with normal S1/S2 without murmurs, rubs or gallops. Abdomen: Soft, non-tender, non-distended with normal bowel sounds. Musculoskeletal: No clubbing, cyanosis. Left fingers swollen, no erythema appreciated. Skin: Skin color, texture, turgor normal.  No rashes or lesions. Neurologic:  Neurovascularly intact without any focal sensory/motor deficits. Cranial nerves: II-XII intact, grossly non-focal.  Psychiatric: Alert and oriented, thought content appropriate, normal insight  Capillary Refill: Brisk,3 seconds, normal   Peripheral Pulses: +2 palpable, equal bilaterally       Labs:   Recent Labs     04/24/21  1527 04/25/21  0749   WBC 13.1* 10.7   HGB 10.3* 10.2*   HCT 31.1* 30.4*    177     Recent Labs     04/24/21  1527 04/25/21  0749   * 130*   K 4.0 3.8   CL 90* 89*   CO2 24 25   BUN 66* 77*   CREATININE 5.1* 6.2*   CALCIUM 8.8 8.8     Urinalysis:      Lab Results   Component Value Date    NITRU Negative 06/05/2020    WBCUA 10-20 06/05/2020    BACTERIA 2+ 06/05/2020    RBCUA 0-2 06/05/2020    BLOODU TRACE-INTACT 06/05/2020    SPECGRAV 1.020 06/05/2020    GLUCOSEU 250 06/05/2020       Radiology:  XR HAND LEFT (MIN 3 VIEWS)   Final Result   1. Acute, subacute or old avulsion fracture on our base of the proximal   phalanx left thumb. Correlate with area of pain. 2. Peripheral vascular disease.    3. Other portions of the left hand radiograph series appear unremarkable. Assessment/Plan:    Active Hospital Problems    Diagnosis    Cellulitis and abscess of hand [L03.119, L02.519]       Left hand cellulitis/abscess   - Orthopedic surgery consulted/following. Possible plan for I&D  On 4/26.  - Continue IV antibiotics including Vancomycin and Zosyn.  - No blood culture were sent in the ED.   - Continue prn analgesia.      ESRD on hemodialysis M/W/F  - Nephrology to manage.   - Pt reportedly still urinates.      History of cardiomyopathy  - No evidence of fluid overload. Monitor.      CAD s/p cardiac stent  - Stable. Continue aspirin, imdur and statin.      Hypertension  - BP stable. Continue his home meds. Monitor.      Aortic stenosis   - With history of TAVR.     Bronchial asthma  - Stable, continue home inhalers.     Anemia  - Possibly secondary to chronic renal disease, no apparent bleeding, monitor.      Diabetes mellitus   - Uncontrolled A1C of 7.9 in Jan 2021. Repeat in am.   - Continue basal bolus insulin regimen. Monitor. Hyponatremia  - Pseudosecondary to hypoglycemia.     Obesity  - With Body mass index is 38.69 kg/m². Complicating assessment and treatment. Placing patient at risk for multiple co-morbidities as well as early death and contributing to the patient's presentation. Counseled on weight loss.        DVT Prophylaxis: SQ heparin   Diet: DIET GENERAL;  Diet NPO, After Midnight  Code Status: Full Code    PT/OT Eval Status: not indicated     Dispo - 2-3 days pending clinic course     JAY Taylor - CNP

## 2021-04-25 NOTE — PROGRESS NOTES
04/25/21 0404   NIV Type   Skin Assessment Clean, dry, & intact   NIV Started/Stopped On   Equipment Type V60   Mode CPAP   Mask Type Full face mask   Mask Size Medium   Settings/Measurements   CPAP/EPAP 12 cmH2O   Resp 18   FiO2  30 %   Vt Exhaled 450 mL   Minute Volume 6.1 Liters   Mask Leak (lpm) 32 lpm   Comfort Level Good   Using Accessory Muscles No   SpO2 98   Alarm Settings   Alarms On Y   Press Low Alarm 6 cmH2O   High Pressure Alarm 30 cmH2O   Delay Alarm 20 sec(s)   Resp Rate Low Alarm 6   High Respiratory Rate 40 br/min

## 2021-04-25 NOTE — CONSULTS
Pharmacy Note  Vancomycin Consult    Norvel Cockayne is a 46 y.o. male started on Vancomycin for skin & soft tissue infection; consult received from Dr. Desmond Hodges to manage therapy. Also receiving the following antibiotics: Pip/Tazo. Allergies:  Morphine     Tmax: 98.5 F    Recent Labs     04/24/21  1527   CREATININE 5.1*       Recent Labs     04/24/21  1527   WBC 13.1*       Estimated Creatinine Clearance: 21 mL/min (A) (based on SCr of 5.1 mg/dL [facility])). No intake or output data in the 24 hours ending 04/24/21 2123    Wt Readings from Last 1 Encounters:   04/24/21 262 lb (118.8 kg)         Body mass index is 38.69 kg/m². Culture Date      Source                       Results  N/A    Loading dose (critically ill or in ICU, require dialysis or renal replacement therapy): Vancomycin 25 mg/kg IVPB x 1 (maximum 3000 mg). Maintenance dose: 15 mg/kg (maximum: 2000 mg/dose and 4500 mg/day) starting at the next dosing interval determined by renal function  Pulse dose: fluctuating renal function, CAESAR, ESRD   Goal Vancomycin trough: 10-15 mcg/mL or 15-20 mcg/mL   Goal Vancomycin AUC: 400-600     Assessment/Plan:  Vancomycin 1750 mg IV given as a one time loading dose given in ED. Will pulse dose patient based on ESRD; a current HD patient (MWF scheduled). Vancomycin random level ordered for tomorrow AM. Timing of trough level will be determined based on culture results, renal function, and clinical response. Thank you for the consult. Ember Lott, PharmD 4/24/2021  9:28 PM     4/25 0749  Vanc Rdm = 19.7 mcg/mL  Estimated Creatinine Clearance: 18 mL/min (A) (based on SCr of 6.2 mg/dL [facility])). Does not look like patient will have dialysis today- will hold dose.   Vanc level tomorrow prior to dialysis (MWF?)  Laila Naranjo PharmD  4/25/2021 at 9:03 AM Detail Level: Zone Otc Regimen: Apply Petroleum to the affected areas prn.

## 2021-04-26 LAB
ALBUMIN SERPL-MCNC: 3.2 G/DL (ref 3.4–5)
ANION GAP SERPL CALCULATED.3IONS-SCNC: 16 MMOL/L (ref 3–16)
BUN BLDV-MCNC: 81 MG/DL (ref 7–20)
CALCIUM SERPL-MCNC: 8.7 MG/DL (ref 8.3–10.6)
CHLORIDE BLD-SCNC: 92 MMOL/L (ref 99–110)
CO2: 23 MMOL/L (ref 21–32)
CREAT SERPL-MCNC: 7 MG/DL (ref 0.9–1.3)
GFR AFRICAN AMERICAN: 10
GFR NON-AFRICAN AMERICAN: 8
GLUCOSE BLD-MCNC: 170 MG/DL (ref 70–99)
GLUCOSE BLD-MCNC: 229 MG/DL (ref 70–99)
GLUCOSE BLD-MCNC: 249 MG/DL (ref 70–99)
HCT VFR BLD CALC: 28.4 % (ref 40.5–52.5)
HEMOGLOBIN: 9.5 G/DL (ref 13.5–17.5)
MCH RBC QN AUTO: 30.6 PG (ref 26–34)
MCHC RBC AUTO-ENTMCNC: 33.4 G/DL (ref 31–36)
MCV RBC AUTO: 91.8 FL (ref 80–100)
PDW BLD-RTO: 14.5 % (ref 12.4–15.4)
PERFORMED ON: ABNORMAL
PERFORMED ON: ABNORMAL
PHOSPHORUS: 8.6 MG/DL (ref 2.5–4.9)
PLATELET # BLD: 175 K/UL (ref 135–450)
PMV BLD AUTO: 8.3 FL (ref 5–10.5)
POTASSIUM SERPL-SCNC: 3.8 MMOL/L (ref 3.5–5.1)
RBC # BLD: 3.09 M/UL (ref 4.2–5.9)
SODIUM BLD-SCNC: 131 MMOL/L (ref 136–145)
VANCOMYCIN RANDOM: 12.4 UG/ML
WBC # BLD: 10.7 K/UL (ref 4–11)

## 2021-04-26 PROCEDURE — 85027 COMPLETE CBC AUTOMATED: CPT

## 2021-04-26 PROCEDURE — 94640 AIRWAY INHALATION TREATMENT: CPT

## 2021-04-26 PROCEDURE — 80202 ASSAY OF VANCOMYCIN: CPT

## 2021-04-26 PROCEDURE — 6360000002 HC RX W HCPCS: Performed by: INTERNAL MEDICINE

## 2021-04-26 PROCEDURE — 6360000002 HC RX W HCPCS: Performed by: REGISTERED NURSE

## 2021-04-26 PROCEDURE — 2700000000 HC OXYGEN THERAPY PER DAY

## 2021-04-26 PROCEDURE — 94761 N-INVAS EAR/PLS OXIMETRY MLT: CPT

## 2021-04-26 PROCEDURE — 99221 1ST HOSP IP/OBS SF/LOW 40: CPT | Performed by: PHYSICIAN ASSISTANT

## 2021-04-26 PROCEDURE — 6370000000 HC RX 637 (ALT 250 FOR IP): Performed by: INTERNAL MEDICINE

## 2021-04-26 PROCEDURE — 94660 CPAP INITIATION&MGMT: CPT

## 2021-04-26 PROCEDURE — 83036 HEMOGLOBIN GLYCOSYLATED A1C: CPT

## 2021-04-26 PROCEDURE — 2580000003 HC RX 258: Performed by: INTERNAL MEDICINE

## 2021-04-26 PROCEDURE — 6370000000 HC RX 637 (ALT 250 FOR IP): Performed by: NURSE PRACTITIONER

## 2021-04-26 PROCEDURE — 80069 RENAL FUNCTION PANEL: CPT

## 2021-04-26 PROCEDURE — 6370000000 HC RX 637 (ALT 250 FOR IP): Performed by: REGISTERED NURSE

## 2021-04-26 PROCEDURE — 1200000000 HC SEMI PRIVATE

## 2021-04-26 PROCEDURE — 2500000003 HC RX 250 WO HCPCS: Performed by: INTERNAL MEDICINE

## 2021-04-26 PROCEDURE — 90935 HEMODIALYSIS ONE EVALUATION: CPT

## 2021-04-26 PROCEDURE — 36415 COLL VENOUS BLD VENIPUNCTURE: CPT

## 2021-04-26 RX ORDER — TRAZODONE HYDROCHLORIDE 50 MG/1
150 TABLET ORAL NIGHTLY
Status: DISCONTINUED | OUTPATIENT
Start: 2021-04-26 | End: 2021-04-27 | Stop reason: HOSPADM

## 2021-04-26 RX ORDER — AMOXICILLIN AND CLAVULANATE POTASSIUM 500; 125 MG/1; MG/1
1 TABLET, FILM COATED ORAL EVERY 12 HOURS SCHEDULED
Status: DISCONTINUED | OUTPATIENT
Start: 2021-04-26 | End: 2021-04-27 | Stop reason: HOSPADM

## 2021-04-26 RX ORDER — HEPARIN SODIUM 1000 [USP'U]/ML
4000 INJECTION, SOLUTION INTRAVENOUS; SUBCUTANEOUS PRN
Status: DISCONTINUED | OUTPATIENT
Start: 2021-04-26 | End: 2021-04-27 | Stop reason: HOSPADM

## 2021-04-26 RX ADMIN — HEPARIN SODIUM 5000 UNITS: 5000 INJECTION INTRAVENOUS; SUBCUTANEOUS at 13:38

## 2021-04-26 RX ADMIN — HYDRALAZINE HYDROCHLORIDE 25 MG: 25 TABLET, FILM COATED ORAL at 13:38

## 2021-04-26 RX ADMIN — TRAZODONE HYDROCHLORIDE 150 MG: 50 TABLET ORAL at 23:01

## 2021-04-26 RX ADMIN — SODIUM CHLORIDE, PRESERVATIVE FREE 10 ML: 5 INJECTION INTRAVENOUS at 20:04

## 2021-04-26 RX ADMIN — QUETIAPINE FUMARATE 50 MG: 25 TABLET ORAL at 23:01

## 2021-04-26 RX ADMIN — INSULIN LISPRO 6 UNITS: 100 INJECTION, SOLUTION INTRAVENOUS; SUBCUTANEOUS at 17:14

## 2021-04-26 RX ADMIN — PRAVASTATIN SODIUM 40 MG: 40 TABLET ORAL at 19:52

## 2021-04-26 RX ADMIN — PROMETHAZINE HYDROCHLORIDE 12.5 MG: 25 TABLET ORAL at 09:57

## 2021-04-26 RX ADMIN — CALCIUM ACETATE 667 MG: 667 CAPSULE ORAL at 19:52

## 2021-04-26 RX ADMIN — GLYCOPYRROLATE AND FORMOTEROL FUMARATE 2 PUFF: 9; 4.8 AEROSOL, METERED RESPIRATORY (INHALATION) at 19:32

## 2021-04-26 RX ADMIN — PIPERACILLIN AND TAZOBACTAM 2250 MG: 2; .25 INJECTION, POWDER, LYOPHILIZED, FOR SOLUTION INTRAVENOUS at 12:17

## 2021-04-26 RX ADMIN — OXYCODONE HYDROCHLORIDE AND ACETAMINOPHEN 1 TABLET: 7.5; 325 TABLET ORAL at 19:52

## 2021-04-26 RX ADMIN — HYDRALAZINE HYDROCHLORIDE 25 MG: 25 TABLET, FILM COATED ORAL at 21:44

## 2021-04-26 RX ADMIN — EPOETIN ALFA-EPBX 2000 UNITS: 2000 INJECTION, SOLUTION INTRAVENOUS; SUBCUTANEOUS at 14:51

## 2021-04-26 RX ADMIN — GLYCOPYRROLATE AND FORMOTEROL FUMARATE 2 PUFF: 9; 4.8 AEROSOL, METERED RESPIRATORY (INHALATION) at 07:22

## 2021-04-26 RX ADMIN — CALCIUM ACETATE 667 MG: 667 CAPSULE ORAL at 13:38

## 2021-04-26 RX ADMIN — INSULIN GLARGINE 30 UNITS: 100 INJECTION, SOLUTION SUBCUTANEOUS at 21:44

## 2021-04-26 RX ADMIN — PIPERACILLIN AND TAZOBACTAM 2250 MG: 2; .25 INJECTION, POWDER, LYOPHILIZED, FOR SOLUTION INTRAVENOUS at 04:40

## 2021-04-26 RX ADMIN — HEPARIN SODIUM 5000 UNITS: 5000 INJECTION INTRAVENOUS; SUBCUTANEOUS at 21:44

## 2021-04-26 RX ADMIN — AMOXICILLIN AND CLAVULANATE POTASSIUM 1 TABLET: 500; 125 TABLET, FILM COATED ORAL at 20:03

## 2021-04-26 RX ADMIN — VANCOMYCIN HYDROCHLORIDE 500 MG: 500 INJECTION, POWDER, LYOPHILIZED, FOR SOLUTION INTRAVENOUS at 13:39

## 2021-04-26 RX ADMIN — HYDROMORPHONE HYDROCHLORIDE 1 MG: 1 INJECTION, SOLUTION INTRAMUSCULAR; INTRAVENOUS; SUBCUTANEOUS at 03:10

## 2021-04-26 RX ADMIN — OXYCODONE HYDROCHLORIDE AND ACETAMINOPHEN 1 TABLET: 7.5; 325 TABLET ORAL at 14:55

## 2021-04-26 RX ADMIN — INSULIN LISPRO 3 UNITS: 100 INJECTION, SOLUTION INTRAVENOUS; SUBCUTANEOUS at 21:44

## 2021-04-26 ASSESSMENT — PAIN SCALES - GENERAL
PAINLEVEL_OUTOF10: 9
PAINLEVEL_OUTOF10: 9
PAINLEVEL_OUTOF10: 0
PAINLEVEL_OUTOF10: 9

## 2021-04-26 NOTE — CARE COORDINATION
CASE MANAGEMENT INITIAL ASSESSMENT      Reviewed chart and completed assessment with:patient  Explained Case Management role/services. Primary contact information:see below  Health Care Decision Maker :   Primary Decision Maker: Crystal Ann - Spouse - [phone]          Can this person be reached and be able to respond quickly, such as within a few minutes or hours? Yes    Admit date/status:04/24/2021  Diagnosis:Cellulitis and abscess of hand   Is this a Readmission?:  No      Insurance:Terrence [place] required for SNF: Yes       3 night stay required: No    Living arrangements, Adls, care needs, prior to admission:IPTA, lives in double wide w/ramp, uses a scooter, lives w/spouse    Transportation:private     Durable Medical Equipment at home:  Walker__Cane__RTS__ BSC__Shower Chair_x_  02__ HHN__ CPAP_x_  BiPap__  Hospital Bed__ W/C___ Other_Scooter_________    Services in the home and/or outpatient, prior to [address] (if applicable)   · Name: [place]  · Address:  · Dialysis Schedule: [facility]  · Phone:  · Fax:    PT/OT recs:none    Hospital Exemption Notification (HEN):not initited    Barriers to discharge:none    Plan/comments:Pt plans to d/c home when stable. CM will watch for possible needs, including IV ABX. Spouse able to transport.   Yesi Mary RN       ECOC on chart for MD signature

## 2021-04-26 NOTE — PROGRESS NOTES
[facility]    Follow up Note           Reason for Consult:  ESRD  Requesting Physician:  Dr. Albert Foster history  Seen and examined on HD on 4/26 with UF plan for 3 L, patient feeling nauseous and vomited and came off dialysis 1 hour early    ROS: No chest pain/shortness of breath/fever  +nausea/vomiting  PSFH: No visitor    Scheduled Meds:   vancomycin  500 mg Intravenous Once    epoetin siddharth-epbx  2,000 Units Intravenous Q MWF    insulin lispro  0-18 Units Subcutaneous TID WC    insulin lispro  0-9 Units Subcutaneous Nightly    aspirin  81 mg Oral Daily    Calcium Acetate (Phos Binder)  667 mg Oral TID    citalopram  40 mg Oral Daily    DULoxetine  30 mg Oral Daily    hydrALAZINE  25 mg Oral 3 times per day    insulin glargine  30 Units Subcutaneous Nightly    isosorbide mononitrate  30 mg Oral Daily    linaclotide  145 mcg Oral QAM AC    losartan  50 mg Oral Daily    pantoprazole  40 mg Oral QAM AC    pravastatin  40 mg Oral Nightly    QUEtiapine  50 mg Oral QPM    glycopyrrolate-formoterol  2 puff Inhalation BID    sodium chloride flush  5-40 mL Intravenous 2 times per day    heparin (porcine)  5,000 Units Subcutaneous 3 times per day    piperacillin-tazobactam  2,250 mg Intravenous Q8H    vancomycin (VANCOCIN) intermittent dosing (placeholder)   Other RX Placeholder     Continuous Infusions:   sodium chloride       PRN Meds:.heparin (porcine), HYDROmorphone, calcium carbonate, oxyCODONE-acetaminophen, albuterol, sodium chloride flush, sodium chloride, promethazine **OR** ondansetron, polyethylene glycol, acetaminophen **OR** acetaminophen    History of Present Illness on 4/25/21:    46 y.o. yo male with PMH of ESRD who is admitted for  Left hand cellulitis   Had pain for the last week but started swelling in the last few days which prompted to come to hospital   Pt c/o pain while moving his 1st and 2nd fingers on his left hand ; ortho has seen the pt is planning possible I n D    Physical exam:   Constitutional:  VITALS:  BP (!) 147/74   Pulse 88   Temp 97.8 °F (36.6 °C)   Resp 20   Ht 5' 9\" (1.753 m)   Wt 271 lb 6.2 oz (123.1 kg)   SpO2 93%   BMI 40.08 kg/m²   Gen: alert, awake, nad  Skin: no rash, turgor wnl  Heent:  eomi, mmm  Neck: no bruits or jvd noted, thyroid normal  Cardiovascular:  S1, S2 without m/r/g; no lower extremity edema  Respiratory: CTA B without w/r/r; respiratory effort normal  Abdomen:  +bs, soft, nt, nd, no hepatosplenomegaly  Neuro/Psy: AAoriented times 3 ; moves all 4 ext  Musculoskeletal:  Rom, muscular strength intact; digits, nails normal  Access: left IJ [facility]    Data/  Recent Labs     04/24/21  1527 04/25/21  0749 04/26/21  0639   WBC 13.1* 10.7 10.7   HGB 10.3* 10.2* 9.5*   HCT 31.1* 30.4* 28.4*   MCV 91.3 91.1 91.8    177 175     Recent Labs     04/24/21  1527 04/25/21  0749 04/26/21  0639   * 130* 131*   K 4.0 3.8 3.8   CL 90* 89* 92*   CO2 24 25 23   GLUCOSE 392* 322* 170*   PHOS  --   --  8.6*   BUN 66* 77* 81*   CREATININE 5.1* 6.2* 7.0*   LABGLOM 12* 10* 8*   GFRAA 14* 12* 10*       Assessment  -ESRD on HD MWF  -Left hand cellulitis with concerns for tenosynovitis   ortho following  -Anemia  -CKD/MBD  -HTN  -DM, uncontrolled     Plan   -HD  per schedule  -Follow Ortho recommendations  -renal dose abx   -Epo w HD as OP     Thank you for the consultation. Please do not hesitate to call with questions.     Mac Beverage  [facility]  Office: [phone]  Fax:    [phone]

## 2021-04-26 NOTE — PROGRESS NOTES
04/25/21 2328   NIV Type   NIV Started/Stopped On   Equipment Type V60   Mode CPAP   Mask Type Full face mask   Mask Size Medium   Settings/Measurements   CPAP/EPAP 12 cmH2O   Resp 18   FiO2  30 %   Vt Exhaled 421 mL   Minute Volume 7.3 Liters   Mask Leak (lpm) 10 lpm   Comfort Level Good   Using Accessory Muscles No   SpO2 97   Alarm Settings   Alarms On Y

## 2021-04-26 NOTE — PROGRESS NOTES
Department of Orthopedic Surgery  Physician Assistant   Progress Note    Subjective:       Systemic or Specific Complaints: Feels better today. States his hand is still a little sore but improved. Objective:     Patient Vitals for the past 24 hrs:   BP Temp Temp src Pulse Resp SpO2 Weight   04/26/21 1105 (!) 147/74 97.8 °F (36.6 °C) -- 88 20 -- 271 lb 6.2 oz (123.1 kg)   04/26/21 0800 134/65 97.7 °F (36.5 °C) -- 81 20 -- 277 lb 9 oz (125.9 kg)   04/26/21 0734 -- -- -- -- -- 93 % --   04/26/21 0253 (!) 146/72 97.6 °F (36.4 °C) Oral 86 18 96 % --   04/25/21 2359 130/80 97.8 °F (36.6 °C) Axillary 87 18 96 % --   04/25/21 2328 -- -- -- -- 18 -- --   04/25/21 2144 (!) 146/72 97.9 °F (36.6 °C) Oral 74 18 96 % --   04/25/21 1822 (!) 145/80 97.6 °F (36.4 °C) Oral 93 18 92 % --   04/25/21 1358 119/79 -- -- 108 -- -- --       General: alert, appears stated age and cooperative   Wound: Motion: Painful range of Motion in affected extremity   DVT Exam: No evidence of DVT seen on physical exam.     Additional exam: Tenderness noted at volar hand at MCP joint of 2,3 fingers. No fluctuance. Mild erythema. Moving all fingers and thumb. No dorsal pain. Data Review  CBC:   Lab Results   Component Value Date    WBC 10.7 04/26/2021    RBC 3.09 04/26/2021    HGB 9.5 04/26/2021    HCT 28.4 04/26/2021     04/26/2021       Renal:   Lab Results   Component Value Date     04/26/2021    K 3.8 04/26/2021    K 3.8 04/25/2021    CL 92 04/26/2021    CO2 23 04/26/2021    BUN 81 04/26/2021    CREATININE 7.0 04/26/2021    GLUCOSE 170 04/26/2021    CALCIUM 8.7 04/26/2021            Assessment:      left hand cellulitis. Plan:      1:  No apparent need for surgery at this time as he is improving and no fluctuance noted. Will resume diet. Continue with ABX per IM team.  Will plan to f/u with him in the next week to ensure resolution.   2:  Continue Deep venous thrombosis prophylaxis  3:  Continue Pain Control    [facility]

## 2021-04-26 NOTE — PLAN OF CARE
Bed in lowest position, wheels locked, 2/4 side rails up, nonskid footwear on. Bed/ chair check alarm in place, call light within reach. Pt instructed to call out when needing assistance. Pt stated understanding. Nurse will continue to monitor.       Problem: Falls - Risk of:  Goal: Will remain free from falls  Description: Will remain free from falls  Outcome: Ongoing  Goal: Absence of physical injury  Description: Absence of physical injury  Outcome: Ongoing

## 2021-04-26 NOTE — PROGRESS NOTES
Perfect serve:    Pt admitted on 4/24 Left hand cellulitis on Vanc and Zosyn. ESRD on Dialysis MWF, Critical BUN? creatinine 81/7.0   please advise

## 2021-04-26 NOTE — CONSULTS
Consultant: Estella Lee MD  From: Emergency physicians  Reason: Left hand swelling    Chief Complaint   Patient presents with    Hand Pain     left started yesterday  unaware of injury/hurts in fingers/swelling        History of Present Illness      Florence Smith is a 46 y.o. male who presents with left hand swelling. He has a history of end-stage renal disease along with a multitude of other medical conditions reviewed in the chart. He has a history of COPD, peripheral vascular disease, CAD, bicuspid aortic stenosis status post TAVR. He reports 1 to 2-day history of left hand swelling. Swelling is present in the palmar aspect mostly between the index and ring fingers. Swelling and pain extends up dorsally. There was minimal amount of redness he reports. He feels a bit stiff compared to before. He denies any fevers or chills. He denies any acute trauma.     Past History       Past Medical History:   Diagnosis Date    Ambulatory dysfunction     walker for long distances, SOB with distance    Aortic stenosis     echo 2017    Arthritis     hands and hips    Asthma     Bilateral hilar adenopathy syndrome 6/3/2013    CAD (coronary artery disease)     Dr. Estrada Amend [facility]) 04/19/2019    EF= 43%    CH[facility]congestive heart failure) ([facility])     Chronic pain     COPD (chronic obstructive pulmonary disease) ([address]     pulmonology Dr. Pressley Barthel    Depression     Diabetes mellitus ([address]     borderline    Difficult intravenous access     Emphysema of lung ([address]     ESRD (end stage renal disease) on dialysis ([address]     MWF    Fear of needles     Gastric ulcer     GERD (gastroesophageal reflux disease)     Heart valve problem     bicuspic valve    Hemodialysis patient ([address]     History of spinal fracture     work incident    Hx of blood clots     Bilateral lower extremities; stents in place    Hyperlipidemia     Hypertension     MI (myocardial infarction) ([address] 2019 has had 9 MIs. 2019 was the last    Neuromuscular disorder ([address]     due to CVA    Numbness and tingling in left arm     from fistula    Pneumonia     PONV (postoperative nausea and vomiting)     Prolonged emergence from general anesthesia     States requires more medication than most people    Sleep apnea     Uses CPAP    Stroke ([address]     7mm thalamic cva 2017 deficts left side, left side weakness    TIA (transient ischemic attack)     Unspecified diseases of blood and blood-forming organs      Past Surgical History:   Procedure Laterality Date    AORTIC VALVE REPLACEMENT N/A 10/15/2019    TRANSCATHETER AORTIC VALVE REPLACEMENT FEMORAL APPROACH performed by Charly Hussein MD at [address] Right 7/2/2019    PERITONEAL DIALYSIS CATHETER REMOVAL performed by Adrien Cardoza MD at [place] [address] COLONOSCOPY  2/29/2015    WN    CORONARY ANGIOPLASTY WITH STENT PLACEMENT  05/26/15    CYST REMOVAL  08/14/2013    EXCISION CYSTS, NECK X2 AND ABDOMINAL benign    DIAGNOSTIC CARDIAC CATH LAB PROCEDURE      DIALYSIS FISTULA CREATION Left 10/30/2017    LEFT BRACHIAL CEPHALIC FISTULA    DIALYSIS FISTULA CREATION Left 3/27/2019    LIGATION  AV FISTULA performed by Marnie Phalen, MD at [facility]. [address], COLON, DIAGNOSTIC      OTHER SURGICAL HISTORY  02/01/2017    laparoscopic cholecystectomy with intraoperative cholangiogram    OTHER SURGICAL HISTORY  2018    PORT PLACEMENT  - vas cath    OTHER SURGICAL HISTORY Bilateral 06/26/2018    laprascopic peritoneal dialysis catheter placement    OTHER SURGICAL HISTORY Right 09/2018    peritoneal dialysis port placed on right side of abdomen    OTHER SURGICAL HISTORY  05/28/2019    PTA/Stenting R External Iliac artery    MI LAP INSERTION TUNNELED INTRAPERITONEAL CATHETER N/A 9/21/2018    LAPAROSCOPIC PERITONEAL DIALYSIS CATHETER REPLACEMENT performed by Adrien Cardoza MD at [address] [place] [address] UPPER GASTROINTESTINAL ENDOSCOPY  2016    UPPER GASTROINTESTINAL ENDOSCOPY  2017    possible candida, otherwise normal appearing    VASCULAR SURGERY  aprx 2 years ago    2 stents placed, each side of groin     Family History   Problem Relation Age of Onset    Diabetes Mother     Heart Disease Father     Kidney Disease Sister         stage 4-kidney failure    Cancer Sister     Heart Disease Sister     Obesity Sister     Cancer Sister     Heart Disease Sister     Obesity Sister     Alcohol Abuse Brother      Social History     Tobacco Use    Smoking status: Current Every Day Smoker     Packs/day: 0.50     Years: 33.00     Pack years: 16.50     Types: Cigarettes     Last attempt to quit: 2020     Years since quittin.9    Smokeless tobacco: Never Used   Substance Use Topics    Alcohol use: Not Currently     Alcohol/week: 0.0 standard drinks     Comment: occ      No current facility-administered medications on file prior to encounter. Current Outpatient Medications on File Prior to Encounter   Medication Sig Dispense Refill    methylPREDNISolone (MEDROL, JUSTIN,) 4 MG tablet Take by mouth. Take 6 tabs on day 1, 5 tabs on day 2, 4 tabs on day 3, 3 tabs on day 4, 2 tabs on day 5, 1 tab on day 6 21 tablet 0    oxyCODONE-acetaminophen (PERCOCET) 7.5-325 MG per tablet Take 1 tablet by mouth every 4-6 hours as needed for Pain for up to 30 days.  150 tablet 0    traZODone (DESYREL) 150 MG tablet TAKE (1) TABLET BY MOUTH NIGHTLY 30 tablet 10    QUEtiapine (SEROQUEL) 50 MG tablet Take 1 tablet by mouth every evening 30 tablet 5    losartan (COZAAR) 50 MG tablet TAKE 1 TABLET BY MOUTH DAILY 30 tablet 10    Continuous Blood Gluc  (DEXCOM G5 MOBILE ) ADAM Use to check blood sugar 1 Device 0    Continuous Blood Gluc Sensor (DEXCOM G4 SENSOR) MISC Use to check blood sugar 6 each 3    pantoprazole (PROTONIX) 40 MG tablet TAKE (1) TABLET BY MOUTH EACH MORNING BEFORE BREAKFAST 90 tablet 1    simethicone (MYLICON) 80 MG chewable tablet Take 1 tablet by mouth every 6 hours as needed (cramping) 15 tablet 0    glucose monitoring kit (FREESTYLE) monitoring kit 1 kit by Does not apply route daily 1 kit 0    blood glucose test strips (GLUCOSE METER TEST) strip 1 each by In Vitro route 5 times daily As needed. 100 each 3    nitroGLYCERIN (NITROSTAT) 0.4 MG SL tablet DISSOLVE 1 TABLET UNDER THE TONGUE AS NEEDED FOR CHEST PAIN EVERY 5 MINUTES UP TO 3 TIMES.  IF NO RELIEF CALL 911. 25 tablet 10    Insulin Pen Needle 31G X 5 MM MISC 1 each by Does not apply route 4 times daily 300 each 3    hydrOXYzine (VISTARIL) 50 MG capsule TAKE 1 TO 2 CAPSULES BY MOUTH NIGHTLY 60 capsule 5    lidocaine 4 % external patch Place 1 patch onto the skin daily 30 patch 0    hydrALAZINE (APRESOLINE) 50 MG tablet TAKE 1/2 TABLET BY MOUTH EVERY 8 HOURS 90 tablet 2    B Complex-C-Folic Acid (VIRT-CAPS) 1 MG CAPS TK ONE C PO  QD 90 capsule 1    Continuous Blood Gluc Sensor (FREESTYLE STEPHANY 14 DAY SENSOR) MISC 1 each by Does not apply route every 14 days 6 each 3    insulin glargine (BASAGLAR KWIKPEN) 100 UNIT/ML injection pen Inject 70 Units into the skin nightly 15 mL 5    citalopram (CELEXA) 40 MG tablet TAKE (1) TABLET BY MOUTH DAILY 30 tablet 10    insulin aspart (NOVOLOG FLEXPEN) 100 UNIT/ML injection pen Inject 20 Units into the skin 3 times daily (before meals) 15 pen 5    clopidogrel (PLAVIX) 75 MG tablet TAKE 1 TABLET BY MOUTH ONCE DAILY 90 tablet 3    pravastatin (PRAVACHOL) 80 MG tablet TAKE (1) TABLET BY MOUTH ONCE DAILY 90 tablet 3    isosorbide mononitrate (IMDUR) 30 MG extended release tablet TAKE 1 TABLET BY MOUTH EVERY DAY 90 tablet 10    torsemide (DEMADEX) 100 MG tablet Take 1-2 tablets by mouth daily 180 tablet 3    ondansetron (ZOFRAN ODT) 4 MG disintegrating tablet Take 1 tablet by mouth every 8 hours as needed for Nausea 60 tablet 0    LINZESS 145 MCG capsule TAKE 1 CAPSULE BY MOUTH EVERY MORNING BEFORE BREAKFAST 30 capsule 10    Calcium Acetate, Phos Binder, 667 MG CAPS TAKE 1 CAPSULE BY MOUTH THREE TIMES DAILY WITH MEALS 90 capsule 3    tiZANidine (ZANAFLEX) 4 MG tablet TAKE 1 TABLET BY MOUTH THREE TIMES DAILY 90 tablet 10    DULoxetine (CYMBALTA) 30 MG extended release capsule TAKE 1 CAPSULE BY MOUTH EVERY DAY 90 capsule 10    nystatin-triamcinolone (MYCOLOG II) 142775-9.1 UNIT/GM-% cream Apply topically 2 times daily. 60 g 2    albuterol (PROVENTIL) (2.5 MG/3ML) 0.083% nebulizer solution INHALE 1 VIAL VIA NEBULIZER EVERY 6 HOURS AS NEEDED FOR WHEEZING 300 mL 10    nystatin (MYCOSTATIN) 597622 UNIT/GM cream Apply topically 2 times daily. 60 g 3    Insulin Syringe-Needle U-100 30G X 1/2\" 0.5 ML MISC 1 each by Does not apply route daily 100 each 3    blood glucose test strips (FREESTYLE LITE) strip Daily As needed. 100 strip 3    glucose monitoring kit (FREESTYLE) monitoring kit 1 kit by Does not apply route daily 1 kit 0    vitamin D (ERGOCALCIFEROL) 29275 units CAPS capsule TK 1 C PO WEEKLY  11    flunisolide (NASALIDE) 25 MCG/ACT (0.025%) SOLN Inhale 2 sprays into the lungs every 12 hours 1 Bottle 5    Tiotropium Bromide-Olodaterol (STIOLTO RESPIMAT) 2.5-2.5 MCG/ACT AERS Inhale 2 puffs into the lungs daily 2 Inhaler 0    Polyethylene Glycol 3350 GRAN       Glucose Blood (BLOOD GLUCOSE TEST STRIPS) STRP TEST 3-4 TIMES DAILY, AS DIRECTED 100 strip 3    Blood Glucose Monitoring Suppl ADAM USE AS DIRECTED.  1 Device 0    Alcohol Swabs PADS USE AS DIRECTED 300 each 3    albuterol sulfate  (90 Base) MCG/ACT inhaler Inhale 2 puffs into the lungs every 6 hours as needed for Wheezing 1 Inhaler 3    ipratropium-albuterol (DUONEB) 0.5-2.5 (3) MG/3ML SOLN nebulizer solution Inhale 3 mLs into the lungs every 6 hours as needed for Shortness of Breath 360 mL 1    Lancets MISC Test daily 100 each 3    calcium carbonate (TUMS) 500 MG chewable tablet Take 1 tablet by mouth 3 times daily as needed for Heartburn.  aspirin 81 MG chewable tablet Take 1 tablet by mouth daily. (Patient taking differently: Take 81 mg by mouth daily Indications: stopped on 6/25 for surgery ) 30 tablet 2      Morphine    Review of Systems      10-point ROS is negative other than what's mentioned in HPI. Physical Exam    Based off 1997 Exam Criteria    BP (!) 146/72   Pulse 74   Temp 97.9 °F (36.6 °C) (Oral)   Resp 18   Ht 5' 9\" (1.753 m)   Wt 262 lb (118.8 kg)   SpO2 96%   BMI 38.69 kg/m²      Constitutional:       General: He is not in acute distress. Appearance: Normal appearance. Cardiovascular:      Rate and Rhythm: Normal rate and regular rhythm. Pulses: Normal pulses. Pulmonary:      Effort: Pulmonary effort is normal. No respiratory distress. Neurological:      Mental Status: He is alert and oriented to person, place, and time. Mental status is at baseline. Musculoskeletal:  Left hand: Point tenderness mild swelling present at the palmar surface between the index and middle as well as middle and ring fingers. Collar-button region appears to have some swelling in the subcutaneous region but no evidence of fluctuance. No evidence of induration. Minor erythema present in the volar surface. Minimal tenderness also present dorsally but again no evidence of fluctuance. No point tenderness along the flexor sheath of the fingers. Able to flex and extend with mild pain only. Perfused digits. Neurologically intact. No evidence of trauma. Imaging       Left hand:  Radiographs: No evidence of fractures, soft tissue swellings consistent with clinical exam.      Assessment and Plan      Left hand cellulitis    I recommend stockinette elevation and IV antibiotics for 24 to 48 hours.   If he worsens tomorrow, I would have concern for collar-button abscess in between the index and middle webspace, and to a lesser degree the middle and ring webspace.    -I anticipate his swelling will subside with elevation and IV antibiotics. Follow with you. Electronically signed by Damion Montiel MD on 4/25/2021 at 10:57 PM  This dictation was generated by voice recognition computer software. Although all attempts are made to edit the dictation for accuracy, there may be errors in the transcription that are not intended.

## 2021-04-26 NOTE — PROGRESS NOTES
Hospitalist Progress Note      PCP: Ashly Chery MD    Date of Admission: 4/24/2021    Chief Complaint: Left hand swelling and pain for last couple of days    Hospital Course:   \"44 y.o. male who presented to [facility] with above complaint. He has been having some pain over the left hand for last 1 week and is started to swell and pain became worse for last few days. Thus the reason he came to the emergency room. He denies any injury or bite or break. There is no skin opening. He does not have fever nausea vomiting or change in mental status. He does not have cough shortness of breath fever tingling or numbness over the extremities. \"    Subjective: EMR notes reviewed patient seen and examined underwent hemodialysis today still complains of pain and discomfort to the left hand although I do not see any signs of cellulitis present    Medications:  Reviewed    Infusion Medications    sodium chloride       Scheduled Medications    vancomycin  500 mg Intravenous Once    insulin lispro  0-18 Units Subcutaneous TID WC    insulin lispro  0-9 Units Subcutaneous Nightly    aspirin  81 mg Oral Daily    Calcium Acetate (Phos Binder)  667 mg Oral TID    citalopram  40 mg Oral Daily    DULoxetine  30 mg Oral Daily    hydrALAZINE  25 mg Oral 3 times per day    insulin glargine  30 Units Subcutaneous Nightly    isosorbide mononitrate  30 mg Oral Daily    linaclotide  145 mcg Oral QAM AC    losartan  50 mg Oral Daily    pantoprazole  40 mg Oral QAM AC    pravastatin  40 mg Oral Nightly    QUEtiapine  50 mg Oral QPM    glycopyrrolate-formoterol  2 puff Inhalation BID    sodium chloride flush  5-40 mL Intravenous 2 times per day    heparin (porcine)  5,000 Units Subcutaneous 3 times per day    piperacillin-tazobactam  2,250 mg Intravenous Q8H    vancomycin (VANCOCIN) intermittent dosing (placeholder)   Other RX Placeholder     PRN Meds: HYDROmorphone, calcium carbonate, oxyCODONE-acetaminophen, albuterol, sodium chloride flush, sodium chloride, promethazine **OR** ondansetron, polyethylene glycol, acetaminophen **OR** acetaminophen    No intake or output data in the 24 hours ending 04/26/21 0842    Physical Exam Performed:    /65   Pulse 81   Temp 97.7 °F (36.5 °C)   Resp 20   Ht 5' 9\" (1.753 m)   Wt 277 lb 9 oz (125.9 kg)   SpO2 93%   BMI 40.99 kg/m²     General appearance: No apparent distress, appears stated age and cooperative. HEENT: Pupils equal, round, and reactive to light. Conjunctivae/corneas clear. Neck: Supple, with full range of motion. No jugular venous distention. Trachea midline. Respiratory:  Normal respiratory effort. Clear to auscultation, bilaterally without Rales/Wheezes/Rhonchi. Cardiovascular: Regular rate and rhythm with normal S1/S2 without murmurs, rubs or gallops. Abdomen: Soft, non-tender, non-distended with normal bowel sounds. Musculoskeletal: No clubbing, cyanosis. Left fingers swollen, no erythema appreciated. Skin: Skin color, texture, turgor normal.  No rashes or lesions. Neurologic:  Neurovascularly intact without any focal sensory/motor deficits.  Cranial nerves: II-XII intact, grossly non-focal.  Psychiatric: Alert and oriented, thought content appropriate, normal insight  Capillary Refill: Brisk,3 seconds, normal   Peripheral Pulses: +2 palpable, equal bilaterally       Labs:   Recent Labs     04/24/21  1527 04/25/21  0749 04/26/21  0639   WBC 13.1* 10.7 10.7   HGB 10.3* 10.2* 9.5*   HCT 31.1* 30.4* 28.4*    177 175     Recent Labs     04/24/21  1527 04/25/21  0749 04/26/21  0639   * 130* 131*   K 4.0 3.8 3.8   CL 90* 89* 92*   CO2 24 25 23   BUN 66* 77* 81*   CREATININE 5.1* 6.2* 7.0*   CALCIUM 8.8 8.8 8.7   PHOS  --   --  8.6*     Urinalysis:      Lab Results   Component Value Date    NITRU Negative 06/05/2020    WBCUA 10-20 06/05/2020    BACTERIA 2+ 06/05/2020    RBCUA 0-2 06/05/2020    BLOODU TRACE-INTACT 06/05/2020    SPECGRAV 1.020 06/05/2020    GLUCOSEU 250 06/05/2020       Radiology:  XR HAND LEFT (MIN 3 VIEWS)   Final Result   1. Acute, subacute or old avulsion fracture on our base of the proximal   phalanx left thumb. Correlate with area of pain. 2. Peripheral vascular disease. 3. Other portions of the left hand radiograph series appear unremarkable. Assessment/Plan:    Active Hospital Problems    Diagnosis    Cellulitis and abscess of hand [L03.119, L02.519]       Left hand cellulitis/abscess   - Orthopedic surgery consulted/following. Possible plan for I&D    - Continue IV antibiotics including Vancomycin and Zosyn.  - No blood culture were sent in the ED.   - Continue prn analgesia.      ESRD on hemodialysis M/W/F  - Nephrology to manage.   - Pt reportedly still urinates.      History of cardiomyopathy  - No evidence of fluid overload. Monitor.      CAD s/p cardiac stent  - Stable. Continue aspirin, imdur and statin.      Hypertension  - BP stable. Continue his home meds. Monitor.      Aortic stenosis   - With history of TAVR.     Bronchial asthma  - Stable, continue home inhalers.     Anemia  - Possibly secondary to chronic renal disease, no apparent bleeding, monitor.      Diabetes mellitus   - Uncontrolled A1C of 7.9 in Jan 2021. Repeat in am.   - Continue basal bolus insulin regimen. Monitor. Hyponatremia  - Pseudosecondary to hypoglycemia.     Obesity  - With Body mass index is 40.99 kg/m². Complicating assessment and treatment. Placing patient at risk for multiple co-morbidities as well as early death and contributing to the patient's presentation. Counseled on weight loss.        DVT Prophylaxis: SQ heparin   Diet: Diet NPO, After Midnight  Code Status: Full Code    PT/OT Eval Status: not indicated     Dispo - pending clinic course     Claudia Rodriguez, APRN - CNP

## 2021-04-27 VITALS
TEMPERATURE: 98.6 F | HEART RATE: 87 BPM | WEIGHT: 271.39 LBS | BODY MASS INDEX: 40.2 KG/M2 | DIASTOLIC BLOOD PRESSURE: 95 MMHG | HEIGHT: 69 IN | OXYGEN SATURATION: 96 % | RESPIRATION RATE: 15 BRPM | SYSTOLIC BLOOD PRESSURE: 151 MMHG

## 2021-04-27 DIAGNOSIS — F32.A DEPRESSION, UNSPECIFIED DEPRESSION TYPE: ICD-10-CM

## 2021-04-27 LAB
ALBUMIN SERPL-MCNC: 3.2 G/DL (ref 3.4–5)
ANION GAP SERPL CALCULATED.3IONS-SCNC: 16 MMOL/L (ref 3–16)
BUN BLDV-MCNC: 44 MG/DL (ref 7–20)
CALCIUM SERPL-MCNC: 8.9 MG/DL (ref 8.3–10.6)
CHLORIDE BLD-SCNC: 93 MMOL/L (ref 99–110)
CO2: 22 MMOL/L (ref 21–32)
CREAT SERPL-MCNC: 4.8 MG/DL (ref 0.9–1.3)
ESTIMATED AVERAGE GLUCOSE: 211.6 MG/DL
GFR AFRICAN AMERICAN: 15
GFR NON-AFRICAN AMERICAN: 13
GLUCOSE BLD-MCNC: 113 MG/DL (ref 70–99)
GLUCOSE BLD-MCNC: 128 MG/DL (ref 70–99)
GLUCOSE BLD-MCNC: 157 MG/DL (ref 70–99)
GLUCOSE BLD-MCNC: 221 MG/DL (ref 70–99)
HBA1C MFR BLD: 9 %
HCT VFR BLD CALC: 30 % (ref 40.5–52.5)
HEMOGLOBIN: 10.2 G/DL (ref 13.5–17.5)
MCH RBC QN AUTO: 30.7 PG (ref 26–34)
MCHC RBC AUTO-ENTMCNC: 34.1 G/DL (ref 31–36)
MCV RBC AUTO: 90.1 FL (ref 80–100)
PDW BLD-RTO: 14.9 % (ref 12.4–15.4)
PERFORMED ON: ABNORMAL
PHOSPHORUS: 5.7 MG/DL (ref 2.5–4.9)
PLATELET # BLD: 208 K/UL (ref 135–450)
PMV BLD AUTO: 8 FL (ref 5–10.5)
POTASSIUM SERPL-SCNC: 3.7 MMOL/L (ref 3.5–5.1)
RBC # BLD: 3.33 M/UL (ref 4.2–5.9)
SODIUM BLD-SCNC: 131 MMOL/L (ref 136–145)
VANCOMYCIN RANDOM: 14.4 UG/ML
WBC # BLD: 9.6 K/UL (ref 4–11)

## 2021-04-27 PROCEDURE — 2580000003 HC RX 258: Performed by: INTERNAL MEDICINE

## 2021-04-27 PROCEDURE — 6360000002 HC RX W HCPCS: Performed by: INTERNAL MEDICINE

## 2021-04-27 PROCEDURE — 6370000000 HC RX 637 (ALT 250 FOR IP): Performed by: INTERNAL MEDICINE

## 2021-04-27 PROCEDURE — 6370000000 HC RX 637 (ALT 250 FOR IP): Performed by: NURSE PRACTITIONER

## 2021-04-27 PROCEDURE — 2500000003 HC RX 250 WO HCPCS: Performed by: INTERNAL MEDICINE

## 2021-04-27 PROCEDURE — 94660 CPAP INITIATION&MGMT: CPT

## 2021-04-27 PROCEDURE — 6370000000 HC RX 637 (ALT 250 FOR IP): Performed by: REGISTERED NURSE

## 2021-04-27 PROCEDURE — 94761 N-INVAS EAR/PLS OXIMETRY MLT: CPT

## 2021-04-27 PROCEDURE — 80202 ASSAY OF VANCOMYCIN: CPT

## 2021-04-27 PROCEDURE — 85027 COMPLETE CBC AUTOMATED: CPT

## 2021-04-27 PROCEDURE — 36415 COLL VENOUS BLD VENIPUNCTURE: CPT

## 2021-04-27 PROCEDURE — 80069 RENAL FUNCTION PANEL: CPT

## 2021-04-27 PROCEDURE — 2700000000 HC OXYGEN THERAPY PER DAY

## 2021-04-27 RX ORDER — PRAVASTATIN SODIUM 40 MG
40 TABLET ORAL NIGHTLY
Qty: 30 TABLET | Refills: 3 | Status: SHIPPED | OUTPATIENT
Start: 2021-04-27 | End: 2021-08-05

## 2021-04-27 RX ORDER — TIZANIDINE 4 MG/1
TABLET ORAL
Qty: 90 TABLET | Refills: 10 | Status: SHIPPED | OUTPATIENT
Start: 2021-04-27 | End: 2021-10-26 | Stop reason: ALTCHOICE

## 2021-04-27 RX ORDER — AMOXICILLIN AND CLAVULANATE POTASSIUM 500; 125 MG/1; MG/1
1 TABLET, FILM COATED ORAL EVERY 12 HOURS SCHEDULED
Qty: 14 TABLET | Refills: 0 | Status: SHIPPED | OUTPATIENT
Start: 2021-04-27 | End: 2021-05-04

## 2021-04-27 RX ORDER — DULOXETIN HYDROCHLORIDE 30 MG/1
CAPSULE, DELAYED RELEASE ORAL
Qty: 30 CAPSULE | Refills: 10 | Status: SHIPPED | OUTPATIENT
Start: 2021-04-27 | End: 2021-10-05 | Stop reason: SDUPTHER

## 2021-04-27 RX ADMIN — DULOXETINE HYDROCHLORIDE 30 MG: 30 CAPSULE, DELAYED RELEASE ORAL at 09:31

## 2021-04-27 RX ADMIN — HYDRALAZINE HYDROCHLORIDE 25 MG: 25 TABLET, FILM COATED ORAL at 06:27

## 2021-04-27 RX ADMIN — AMOXICILLIN AND CLAVULANATE POTASSIUM 1 TABLET: 500; 125 TABLET, FILM COATED ORAL at 09:31

## 2021-04-27 RX ADMIN — INSULIN LISPRO 6 UNITS: 100 INJECTION, SOLUTION INTRAVENOUS; SUBCUTANEOUS at 13:26

## 2021-04-27 RX ADMIN — PANTOPRAZOLE SODIUM 40 MG: 40 TABLET, DELAYED RELEASE ORAL at 06:31

## 2021-04-27 RX ADMIN — CITALOPRAM HYDROBROMIDE 40 MG: 20 TABLET ORAL at 09:31

## 2021-04-27 RX ADMIN — ISOSORBIDE MONONITRATE 30 MG: 30 TABLET, EXTENDED RELEASE ORAL at 09:31

## 2021-04-27 RX ADMIN — ASPIRIN 81 MG: 81 TABLET, CHEWABLE ORAL at 09:31

## 2021-04-27 RX ADMIN — HEPARIN SODIUM 5000 UNITS: 5000 INJECTION INTRAVENOUS; SUBCUTANEOUS at 06:28

## 2021-04-27 RX ADMIN — SODIUM CHLORIDE, PRESERVATIVE FREE 10 ML: 5 INJECTION INTRAVENOUS at 09:34

## 2021-04-27 RX ADMIN — CALCIUM ACETATE 667 MG: 667 CAPSULE ORAL at 09:31

## 2021-04-27 RX ADMIN — INSULIN LISPRO 3 UNITS: 100 INJECTION, SOLUTION INTRAVENOUS; SUBCUTANEOUS at 09:36

## 2021-04-27 RX ADMIN — OXYCODONE HYDROCHLORIDE AND ACETAMINOPHEN 1 TABLET: 7.5; 325 TABLET ORAL at 09:32

## 2021-04-27 RX ADMIN — LOSARTAN POTASSIUM 50 MG: 25 TABLET, FILM COATED ORAL at 09:31

## 2021-04-27 ASSESSMENT — PAIN SCALES - GENERAL: PAINLEVEL_OUTOF10: 8

## 2021-04-27 NOTE — PROGRESS NOTES
04/27/21 0100   NIV Type   $NIV $Daily Charge   Skin Protection for O2 Device Yes   Location Nose   Equipment Type [id number]   Mode CPAP   Mask Type Full face mask   Mask Size Medium   Settings/Measurements   CPAP/EPAP 12 cmH2O   Resp 15   FiO2  30 %   Vt Exhaled 558 mL   Mask Leak (lpm) 0 lpm   Comfort Level Good   Using Accessory Muscles No   SpO2 98   Alarm Settings   Alarms On Y   Press Low Alarm 6 cmH2O   High Pressure Alarm 25 cmH2O   Apnea (secs) 20 secs   Resp Rate Low Alarm 6   High Respiratory Rate 40 br/min

## 2021-04-27 NOTE — PLAN OF CARE
Problem: Falls - Risk of:  Goal: Will remain free from falls  Description: Will remain free from falls  Outcome: Ongoing     Problem: Pain:  Goal: Pain level will decrease  Description: Pain level will decrease  Outcome: Ongoing     Problem: OXYGENATION/RESPIRATORY FUNCTION  Goal: Patient will maintain patent airway  Outcome: Ongoing     Problem: Gas Exchange - Impaired:  Goal: Levels of oxygenation will improve  Description: Levels of oxygenation will improve  Outcome: Ongoing

## 2021-04-27 NOTE — CARE COORDINATION
CASE MANAGEMENT DISCHARGE SUMMARY      Discharge to: home w/no needs      Transportation: private   Confirmed discharge plan with:     Patient: yes     RN, name: Santo Desouza  Note: Pt will d/c on oral ABX.   Brown Nissen, RN

## 2021-04-27 NOTE — PROGRESS NOTES
[facility]    Follow up Note           Reason for Consult:  ESRD  Requesting Physician:  Dr. Veldon Phoenix history  Last HD on 4/26 with 2.8 liters removed, post-weight of 123.1 kg. Denies any sob. ROS:   + left hand pain, no fevers.     Scheduled Meds:   epoetin siddhatrh-epbx  2,000 Units Intravenous Q MWF    amoxicillin-clavulanate  1 tablet Oral 2 times per day    traZODone  150 mg Oral Nightly    insulin lispro  0-18 Units Subcutaneous TID WC    insulin lispro  0-9 Units Subcutaneous Nightly    aspirin  81 mg Oral Daily    Calcium Acetate (Phos Binder)  667 mg Oral TID    citalopram  40 mg Oral Daily    DULoxetine  30 mg Oral Daily    hydrALAZINE  25 mg Oral 3 times per day    insulin glargine  30 Units Subcutaneous Nightly    isosorbide mononitrate  30 mg Oral Daily    linaclotide  145 mcg Oral QAM AC    losartan  50 mg Oral Daily    pantoprazole  40 mg Oral QAM AC    pravastatin  40 mg Oral Nightly    QUEtiapine  50 mg Oral QPM    glycopyrrolate-formoterol  2 puff Inhalation BID    sodium chloride flush  5-40 mL Intravenous 2 times per day    heparin (porcine)  5,000 Units Subcutaneous 3 times per day     Continuous Infusions:   sodium chloride       PRN Meds:.heparin (porcine), HYDROmorphone, calcium carbonate, oxyCODONE-acetaminophen, albuterol, sodium chloride flush, sodium chloride, promethazine **OR** ondansetron, polyethylene glycol, acetaminophen **OR** acetaminophen    History of Present Illness on 4/25/21:    46 y.o. yo male with PMH of ESRD who is admitted for  Left hand cellulitis   Had pain for the last week but started swelling in the last few days which prompted to come to hospital   Pt c/o pain while moving his 1st and 2nd fingers on his left hand ; ortho has seen the pt is planning possible I n D    Physical exam:   Constitutional:  VITALS:  BP (!) 166/81   Pulse 84   Temp 97.9 °F (36.6 °C)   Resp 15   Ht 5' 9\" (1.753 m)   Wt 271 lb

## 2021-04-27 NOTE — DISCHARGE SUMMARY
Hospital Medicine Discharge Summary    Patient ID: Jenine Fleischer      Patient's PCP: Dorma Gitelman, MD    Admit Date: 4/24/2021     Discharge Date:   4/27/21     Admitting Physician: Loraine Dempsey MD     Discharge Physician: JAY Allen - CNP     Discharge Diagnoses: Active Hospital Problems    Diagnosis    Cellulitis and abscess of hand [L03.119, L02.519]       The patient was seen and examined on day of discharge and this discharge summary is in conjunction with any daily progress note from day of discharge. Hospital Course:   \"44 y. o. male who presented to [facility] with above complaint. He has been having some pain over the left hand for last 1 week and is started to swell and pain became worse for last few days. Thus the reason he came to the emergency room. He denies any injury or bite or break. There is no skin opening. He does not have fever nausea vomiting or change in mental status. He does not have cough shortness of breath fever tingling or numbness over the extremities. \"    Left hand cellulitis/abscess   - Orthopedic surgery consulted/following. Possible plan for I&D  - aborted no need   - Continue IV antibiotics including Vancomycin and Zosyn. Changed to PO Augmentin renal dose   - No blood culture were sent in the ED.   - Continue prn analgesia.      ESRD on hemodialysis M/W/F  - Nephrology to manage.   - Pt reportedly still urinates.      History of cardiomyopathy  - No evidence of fluid overload. Monitor.      CAD s/p cardiac stent  - Stable. Continue aspirin, imdur and statin.      Hypertension  - BP stable. Continue his home meds. Monitor.      Aortic stenosis   - With history of TAVR.     Bronchial asthma  - Stable, continue home inhalers.     Anemia  - Possibly secondary to chronic renal disease, no apparent bleeding, monitor.      Diabetes mellitus   - Uncontrolled A1C of 7.9 in Jan 2021. Repeat in am.   - Continue basal bolus insulin regimen. Monitor.    Hyponatremia  - Pseudosecondary to hypoglycemia.     Obesity  - With Body mass index is 40.99 kg/m². Complicating assessment and treatment. Placing patient at risk for multiple co-morbidities as well as early death and contributing to the patient's presentation. Counseled on weight loss.          Labs: For convenience and continuity at follow-up the following most recent labs are provided:      CBC:    Lab Results   Component Value Date    WBC 9.6 04/27/2021    HGB 10.2 04/27/2021    HCT 30.0 04/27/2021     04/27/2021       Renal:    Lab Results   Component Value Date     04/27/2021    K 3.7 04/27/2021    K 3.8 04/25/2021    CL 93 04/27/2021    CO2 22 04/27/2021    BUN 44 04/27/2021    CREATININE 4.8 04/27/2021    CALCIUM 8.9 04/27/2021    PHOS 5.7 04/27/2021         Significant Diagnostic Studies    Radiology:   XR HAND LEFT (MIN 3 VIEWS)   Final Result   1. Acute, subacute or old avulsion fracture on our base of the proximal   phalanx left thumb. Correlate with area of pain. 2. Peripheral vascular disease. 3. Other portions of the left hand radiograph series appear unremarkable.                 Consults:     IP CONSULT TO HOSPITALIST  IP CONSULT TO NEPHROLOGY  IP CONSULT TO PHARMACY  IP CONSULT TO ORTHOPEDIC SURGERY    Disposition:  Home     Condition at Discharge: Stable    Discharge Instructions/Follow-up:  PCP and Nephrology     Code Status:  Full Code     Activity: activity as tolerated    Diet: renal diet      Discharge Medications:     Current Discharge Medication List           Details   amoxicillin-clavulanate (AUGMENTIN) 500-125 MG per tablet Take 1 tablet by mouth every 12 hours for 7 days  Qty: 14 tablet, Refills: 0              Details   pravastatin (PRAVACHOL) 40 MG tablet Take 1 tablet by mouth nightly  Qty: 30 tablet, Refills: 3              Details   oxyCODONE-acetaminophen (PERCOCET) 7.5-325 MG per tablet Take 1 tablet by mouth every 4-6 hours as needed for Pain for up to 30 days.  Jaime Chute: 150 tablet, Refills: 0    Comments: Reduce doses taken as pain becomes manageable  Associated Diagnoses: Cellulitis, unspecified cellulitis site      traZODone (DESYREL) 150 MG tablet TAKE (1) TABLET BY MOUTH NIGHTLY  Qty: 30 tablet, Refills: 10      QUEtiapine (SEROQUEL) 50 MG tablet Take 1 tablet by mouth every evening  Qty: 30 tablet, Refills: 5    Associated Diagnoses: Insomnia, unspecified type; Mood disorder (HCC)      losartan (COZAAR) 50 MG tablet TAKE 1 TABLET BY MOUTH DAILY  Qty: 30 tablet, Refills: 10      Continuous Blood Gluc  (DEXCOM G5 MOBILE ) ADAM Use to check blood sugar  Qty: 1 Device, Refills: 0    Associated Diagnoses: DM (diabetes mellitus), secondary, uncontrolled, w/neurologic complic ([address]      ! ! Continuous Blood Gluc Sensor (DEXCOM G4 SENSOR) MISC Use to check blood sugar  Qty: 6 each, Refills: 3    Associated Diagnoses: DM (diabetes mellitus), secondary, uncontrolled, w/neurologic complic (HCC)      pantoprazole (PROTONIX) 40 MG tablet TAKE (1) TABLET BY MOUTH EACH MORNING BEFORE BREAKFAST  Qty: 90 tablet, Refills: 1      simethicone (MYLICON) 80 MG chewable tablet Take 1 tablet by mouth every 6 hours as needed (cramping)  Qty: 15 tablet, Refills: 0      !! glucose monitoring kit (FREESTYLE) monitoring kit 1 kit by Does not apply route daily  Qty: 1 kit, Refills: 0    Comments: Which ever is coverd. !! blood glucose test strips (GLUCOSE METER TEST) strip 1 each by In Vitro route 5 times daily As needed. Qty: 100 each, Refills: 3      nitroGLYCERIN (NITROSTAT) 0.4 MG SL tablet DISSOLVE 1 TABLET UNDER THE TONGUE AS NEEDED FOR CHEST PAIN EVERY 5 MINUTES UP TO 3 TIMES. IF NO RELIEF CALL 911.   Qty: 25 tablet, Refills: 10    Associated Diagnoses: Coronary artery disease involving native heart without angina pectoris, unspecified vessel or lesion type      hydrOXYzine (VISTARIL) 50 MG capsule TAKE 1 TO 2 CAPSULES BY MOUTH NIGHTLY  Qty: 60 capsule, Refills: 5 lidocaine 4 % external patch Place 1 patch onto the skin daily  Qty: 30 patch, Refills: 0      hydrALAZINE (APRESOLINE) 50 MG tablet TAKE 1/2 TABLET BY MOUTH EVERY 8 HOURS  Qty: 90 tablet, Refills: 2      B Complex-C-Folic Acid (VIRT-CAPS) 1 MG CAPS TK ONE C PO  QD  Qty: 90 capsule, Refills: 1      !!  Continuous Blood Gluc Sensor (FREESTYLE STEPHANY 14 DAY SENSOR) MISC 1 each by Does not apply route every 14 days  Qty: 6 each, Refills: 3    Associated Diagnoses: Type 2 diabetes mellitus with diabetic peripheral angiopathy without gangrene, with long-term current use of insulin ([facility])      insulin glargine (BASAGLAR KWIKPEN) 100 UNIT/ML injection pen Inject 70 Units into the skin nightly  Qty: 15 mL, Refills: 5    Associated Diagnoses: Type 2 diabetes mellitus with diabetic nephropathy, with long-term current use of insulin ([facility])      citalopram (CELEXA) 40 MG tablet TAKE (1) TABLET BY MOUTH DAILY  Qty: 30 tablet, Refills: 10    Associated Diagnoses: Depression, unspecified depression type      insulin aspart (NOVOLOG FLEXPEN) 100 UNIT/ML injection pen Inject 20 Units into the skin 3 times daily (before meals)  Qty: 15 pen, Refills: 5    Associated Diagnoses: Type 2 diabetes mellitus with diabetic nephropathy, with long-term current use of insulin ([facility])      isosorbide mononitrate (IMDUR) 30 MG extended release tablet TAKE 1 TABLET BY MOUTH EVERY DAY  Qty: 90 tablet, Refills: 10      ondansetron (ZOFRAN ODT) 4 MG disintegrating tablet Take 1 tablet by mouth every 8 hours as needed for Nausea  Qty: 60 tablet, Refills: 0      LINZESS 145 MCG capsule TAKE 1 CAPSULE BY MOUTH EVERY MORNING BEFORE BREAKFAST  Qty: 30 capsule, Refills: 10    Associated Diagnoses: Chronic idiopathic constipation      Calcium Acetate, Phos Binder, 667 MG CAPS TAKE 1 CAPSULE BY MOUTH THREE TIMES DAILY WITH MEALS  Qty: 90 capsule, Refills: 3      tiZANidine (ZANAFLEX) 4 MG tablet TAKE 1 TABLET BY MOUTH THREE TIMES DAILY  Qty: 90 tablet, Refills: 10      DULoxetine (CYMBALTA) 30 MG extended release capsule TAKE 1 CAPSULE BY MOUTH EVERY DAY  Qty: 90 capsule, Refills: 10    Associated Diagnoses: Depression, unspecified depression type      nystatin-triamcinolone (MYCOLOG II) 847252-7.1 UNIT/GM-% cream Apply topically 2 times daily. Qty: 60 g, Refills: 2      albuterol (PROVENTIL) (2.5 MG/3ML) 0.083% nebulizer solution INHALE 1 VIAL VIA NEBULIZER EVERY 6 HOURS AS NEEDED FOR WHEEZING  Qty: 300 mL, Refills: 10      nystatin (MYCOSTATIN) 029789 UNIT/GM cream Apply topically 2 times daily. Qty: 60 g, Refills: 3    Associated Diagnoses: Yeast dermatitis      !! blood glucose test strips (FREESTYLE LITE) strip Daily As needed. Qty: 100 strip, Refills: 3    Comments: Please dispense what is covered by insurance      !! glucose monitoring kit (FREESTYLE) monitoring kit 1 kit by Does not apply route daily  Qty: 1 kit, Refills: 0    Comments: Please dispense what is covered by insurance      vitamin D (ERGOCALCIFEROL) 64536 units CAPS capsule TK 1 C PO WEEKLY  Refills: 11      flunisolide (NASALIDE) 25 MCG/ACT (0.025%) SOLN Inhale 2 sprays into the lungs every 12 hours  Qty: 1 Bottle, Refills: 5      Tiotropium Bromide-Olodaterol (STIOLTO RESPIMAT) 2.5-2.5 MCG/ACT AERS Inhale 2 puffs into the lungs daily  Qty: 2 Inhaler, Refills: 0    Comments: 2 samples given:  Lot #[id number], Exp 7/21 & Lot #[id number], Exp 7/21      Polyethylene Glycol 3350 GRAN       !! Glucose Blood (BLOOD GLUCOSE TEST STRIPS) STRP TEST 3-4 TIMES DAILY, AS DIRECTED  Qty: 100 strip, Refills: 3      Blood Glucose Monitoring Suppl ADAM USE AS DIRECTED. Qty: 1 Device, Refills: 0    Comments: WITH CONTROL SOLUTION.       Alcohol Swabs PADS USE AS DIRECTED  Qty: 300 each, Refills: 3      albuterol sulfate  (90 Base) MCG/ACT inhaler Inhale 2 puffs into the lungs every 6 hours as needed for Wheezing  Qty: 1 Inhaler, Refills: 3      ipratropium-albuterol (DUONEB) 0.5-2.5 (3) MG/3ML SOLN nebulizer solution Inhale 3 mLs into the lungs every 6 hours as needed for Shortness of Breath  Qty: 360 mL, Refills: 1      calcium carbonate (TUMS) 500 MG chewable tablet Take 1 tablet by mouth 3 times daily as needed for Heartburn. aspirin 81 MG chewable tablet Take 1 tablet by mouth daily. Qty: 30 tablet, Refills: 2       !! - Potential duplicate medications found. Please discuss with provider. Time Spent on discharge is more than 30 minutes in the examination, evaluation, counseling and review of medications and discharge plan. Signed:    JAY Crockett - CNP   4/27/2021      Thank you Alba Patel MD for the opportunity to be involved in this patient's care. If you have any questions or concerns please feel free to contact me at [phone].

## 2021-04-27 NOTE — DISCHARGE INSTR - COC
Continuity of Care Form    Patient Name: Norvel Cockayne   :  1970  MRN:  [id number]    Admit date:  2021  Discharge date:  ***    Code Status Order: Full Code   Advance Directives:   Advance Care Flowsheet Documentation       Date/Time Healthcare Directive Type of Healthcare Directive Copy in [address] Agent's Name Healthcare Agent's Phone Number    [phone]  Yes, patient has an advance directive for healthcare treatment  Living will  Yes, copy in chart  Spouse  crystal  --            Admitting Physician:  Darlin Bingham MD  PCP: Fredis Stahl MD    Discharging Nurse: [facility] Unit/Room#: 8833/7513-13  Discharging Unit Phone Number: ***    Emergency Contact:   Extended Emergency Contact Information  Primary Emergency Contact: Crystal Ann  Address: [address] [place] [address], [address] [place] [address],[address] Phone: [phone]  Mobile Phone: [phone]  Relation: Spouse    Past Surgical History:  Past Surgical History:   Procedure Laterality Date    AORTIC VALVE REPLACEMENT N/A 10/15/2019    TRANSCATHETER AORTIC VALVE REPLACEMENT FEMORAL APPROACH performed by Charly Hussein MD at [facility] Right 2019    PERITONEAL DIALYSIS CATHETER REMOVAL performed by Adrien Cardoza MD at [address] [place] [address]      WN    CORONARY ANGIOPLASTY WITH STENT PLACEMENT  05/26/15    CYST REMOVAL  2013    EXCISION CYSTS, NECK X2 AND ABDOMINAL benign    DIAGNOSTIC CARDIAC CATH LAB PROCEDURE      DIALYSIS FISTULA CREATION Left 10/30/2017    LEFT BRACHIAL CEPHALIC FISTULA    DIALYSIS FISTULA CREATION Left 3/27/2019    LIGATION  AV FISTULA performed by Marnie Phalen, MD at [address] [place] [address], DIAGNOSTIC      OTHER SURGICAL HISTORY  2017    laparoscopic cholecystectomy with intraoperative cholangiogram    OTHER SURGICAL HISTORY  2018    PORT PLACEMENT  - vas cath    OTHER SURGICAL HISTORY Bilateral 06/26/2018    laprascopic peritoneal dialysis catheter placement    OTHER SURGICAL HISTORY Right 09/2018    peritoneal dialysis port placed on right side of abdomen    OTHER SURGICAL HISTORY  05/28/2019    PTA/Stenting R External Iliac artery    AL LAP INSERTION TUNNELED INTRAPERITONEAL CATHETER N/A 9/21/2018    LAPAROSCOPIC PERITONEAL DIALYSIS CATHETER REPLACEMENT performed by Francisco Mix MD at [facility].  01/06/2016    UPPER GASTROINTESTINAL ENDOSCOPY  01/29/2017    possible candida, otherwise normal appearing    VASCULAR SURGERY  aprx 2 years ago    2 stents placed, each side of groin       Immunization History:   Immunization History   Administered Date(s) Administered    Hepatitis B Adult (Engerix-B) 11/18/2017, 06/13/2018, 07/13/2018    Influenza Virus Vaccine 12/27/2012, 10/07/2014, 11/20/2015, 10/16/2019    Influenza, Intradermal, Quadrivalent, Preservative Free 11/16/2016    Influenza, MDCK Quadv, IM, PF (Flucelvax 4 yrs and older) 10/14/2017, 09/30/2020    Influenza, Quadv, 6 mo and older, IM, PF (Flulaval, Fluarix) 09/15/2018    Influenza, Quadv, IM, PF (6 mo and older Fluzone, Flulaval, Fluarix, and 3 yrs and older Afluria) 10/16/2019    PPD Test 11/09/2017, 11/16/2017, 01/03/2019, 01/06/2020, 01/15/2021    Pneumococcal Conjugate 13-valent (Byrekgb10) 10/14/2017    Pneumococcal Polysaccharide (Jkzgsirjb08) 10/07/2014, 11/19/2019    Tdap (Boostrix, Adacel) 02/13/2020    Zoster Recombinant (Shingrix) 02/13/2020       Active Problems:  Patient Active Problem List   Diagnosis Code    Acute respiratory failure with hypoxia and hypercapnia (HCC) J96.01, J96.02    Chronic dCHF (grade 2 LVDD) I50.9    Chronic obstructive pulmonary disease (HCC) J44.9    CAD (coronary artery disease) I25.10    PVD (peripheral vascular disease) ([facility]) I73.9    Nonischemic cardiomyopathy ([facility] I42.8    Sleep apnea G47.30    Bicuspid aortic valve Q23.1    Bilateral hilar adenopathy syndrome R59.0    Claudication in peripheral vascular disease ([facility]) I73.9    Essential hypertension I10    Diabetic neuropathy (HCC) E11.40    Type 2 diabetes, uncontrolled, with neuropathy (HCC) E11.40, E11.65    Passive smoke exposure Z77.22    Depression with anxiety F41.8    Hematoma T14. 8XXA    Pneumonia of right upper lobe due to infectious organism J18.9    DM (diabetes mellitus), secondary, uncontrolled, w/neurologic complic ([facility]) Q08.38, S57.87    Hypertensive heart disease with congestive heart failure with preserved left ventricular function ([facility]) I11.0, I50.30    CHF exacerbation ([facility]) I50.9    Chest pain R07.9    Coronary artery disease involving native coronary artery of native heart without angina pectoris I25.10    Obesity (BMI 30-39. 9) E66.9    ZAINAB on CPAP G47.33, Z99.89    Degeneration of lumbar or lumbosacral intervertebral disc M51.37    Lumbar radiculopathy M54.16    Lumbosacral spondylosis without myelopathy O04.299    Biliary colic U47.11    Symptomatic cholelithiasis K80.20    Gastroparesis due to DM ([facility]) E11.43, K31.84    Angina, class IV ([facility]) I20.9    Dyspnea R06.00    Elevated brain natriuretic peptide (BNP) level R79.89    Dyslipidemia E78.5    Respiratory distress R06.03    Hypoxia R09.02    Chest pressure R07.89    Hypertensive urgency I16.0    Acute on chronic combined systolic and diastolic heart failure ([facility]) I50.43    Ischemic cardiomyopathy I25.5    Chronic renal failure, stage 5 ([facility]) N18.5    Tobacco abuse Z72.0    CKD stage 4 due to type 2 diabetes mellitus ([facility]) E11.22, N18.4    CVA (cerebral vascular accident) ([facility] I63.9    Arterial ischemic stroke, ICA, right, acute ([facility]) I63.231    Type 2 diabetes mellitus without complication, without long-term current use of insulin (HCC) E11.9    HTN (hypertension), benign I10    ZAINAB (obstructive sleep apnea) G47.33    Diarrhea R19.7    Pleural effusion J90 Chronic anemia D64.9    Nonrheumatic aortic valve stenosis I35.0    Hypervolemia E87.70    Hyperkalemia E87.5    Mucus plugging of bronchi J98.09    Hemodialysis-associated hypotension I95.3    Left arm pain M79.602    Dizziness R42    ESRD (end stage renal disease) on dialysis ([facility]) N18.6, Z99.2    Hypotension due to drugs J65.3    Acute diastolic CHF (congestive heart failure) ([facility]) I50.31    Neuromuscular disorder ([facility]) G70.9    Acute combined systolic and diastolic CHF, NYHA class 4 ([facility]) I50.41    Renovascular hypertension I15.0    Mixed hyperlipidemia E78.2    Cigarette nicotine dependence in remission F17.211    Acute respiratory failure ([facility]) J96.00    Pulmonary edema J81.1    Fluid overload W24.71    Diastolic dysfunction G96.74    Anemia of chronic disease D63.8    SOB (shortness of breath) R06.02    Steal syndrome of dialysis vascular access [facility]) T82.898A    Chronic, continuous use of opioids F11.90    Chronic bronchitis ([facility]) J42    Nasal congestion R09.81    Hypercholesteremia E78.00    Bradycardia R00.1    S/P TAVR (transcatheter aortic valve replacement) Z95.2    Syncope and collapse R55    Atrial fibrillation ([facility]) I48.91    Former smoker Z87.891    Generalized weakness R53.1    DKA, type 1, not at goal [facility]) E10.10    Bilateral leg weakness R29.898    GBS (Guillain-Tyler syndrome) ([facility]) G61.0    Sinus pause I45.5    Weakness of both lower extremities R29.898    Sinus bradycardia R00.1    Ataxia R27.0    Peripheral vascular occlusive disease ([facility]) I73.9    Cellulitis of right foot L03.115    Iliac artery occlusion, 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    Cellulitis and abscess of hand L03.119, L02.519       Isolation/Infection:   Isolation            No Isolation          Patient Infection Status       Infection Onset Added Last Indicated Last Indicated By Review Planned Expiration Resolved Resolved By    None active    Resolved    COVID-19 Rule Out 12/18/20 12/18/20 12/18/20 COVID-19 (Ordered)   12/18/20 Rule-Out Test Resulted    COVID-19 Rule Out 05/04/20 05/04/20 05/04/20 COVID-19 (Ordered)   05/04/20 Rule-Out Test Resulted            Nurse Assessment:  Last Vital Signs: BP (!) 166/81   Pulse 84   Temp 97.9 °F (36.6 °C)   Resp 15   Ht 5' 9\" (1.753 m)   Wt 271 lb 6.2 oz (123.1 kg)   SpO2 100%   BMI 40.08 kg/m²     Last documented pain score (0-10 scale): Pain Level: 9  Last Weight:   Wt Readings from Last 1 Encounters:   04/26/21 271 lb 6.2 oz (123.1 kg)     Mental Status:  {IP PT MENTAL STATUS:20030:::0}    IV Access:  { CELINE IV ACCESS:446301464:::0}    Nursing Mobility/ADLs:  Walking   {CHP DME ADLs:944698506:::0}  Transfer  {CHP DME ADLs:066496118:::0}  Bathing  {CHP DME ADLs:621950344:::0}  Dressing  {CHP DME ADLs:505361056:::0}  Toileting  {CHP DME ADLs:981346148:::0}  Feeding  {CHP DME ADLs:282838616:::0}  Med Admin  {CHP DME ADLs:981974666:::0}  Med Delivery   { CELINE MED Delivery:785782583:::0}    Wound Care Documentation and Therapy:  Wound 12/31/20 Foot Left (Active)   Number of days: 116       Wound 12/31/20 Foot Right (Active)   Number of days: 116        Elimination:  Continence: Bowel: {YES / SV:67240}  Bladder: {YES / NM:84741}  Urinary Catheter: {Urinary Catheter:465265828:::0}   Colostomy/Ileostomy/Ileal Conduit: {YES / OO:15768}       Date of Last BM: ***  No intake or output data in the 24 hours ending 04/27/21 0918  No intake/output data recorded.     Safety Concerns:     [employer] Safety Concerns:092287515:::0}    Impairments/Disabilities:      [employer] Impairments/Disabilities:882019107:::0}    Nutrition Therapy:  Current Nutrition Therapy:   [address] [employer] CELINE Diet List:233197078:::0}    Routes of Feeding: {CHP DME Other Feedings:748025136:::0}  Liquids: {Slp liquid thickness:06466}  Daily Fluid Restriction: {CHP DME Yes amt example:307387167:::0}  Last Modified Barium Swallow with Video (Video Swallowing Test): {Done Not Done MFPB:700265468:::8}    Treatments at the Time of Hospital Discharge: Respiratory Treatments: ***  Oxygen Therapy:  {Therapy; copd oxygen:41774:::0}  Ventilator:    { CC Vent List:336358861:::0}    Rehab Therapies: {THERAPEUTIC INTERVENTION:0870604205}  Weight Bearing Status/Restrictions: Mario ADAME Weight Bearin:::0}  Other Medical Equipment (for information only, NOT a DME order):  {EQUIPMENT:038031125}  Other Treatments: ***    Patient's personal belongings (please select all that are sent with patient):  {CHP DME Belongings:165751794:::0}    RN SIGNATURE:  {Esignature:746718921:::0}    CASE MANAGEMENT/SOCIAL WORK SECTION    Inpatient Status Date: ***    Readmission Risk Assessment Score:  Readmission Risk              Risk of Unplanned Readmission:        68           Discharging to Facility/ Agency   Name:   Address:  Phone:  Fax:    Dialysis Facility (if applicable)   Name:  Address:  Dialysis Schedule:  Phone:  Fax:    / signature: {Esignature:754770121:::0}    PHYSICIAN SECTION    Prognosis: {Prognosis:9892728336:::0}    Condition at Discharge: Mario Mosher Patient Condition:110150479:::0}    Rehab Potential (if transferring to Rehab): {Prognosis:4238740142:::0}    Recommended Labs or Other Treatments After Discharge: ***    Physician Certification: I certify the above information and transfer of Nicko Reed  is necessary for the continuing treatment of the diagnosis listed and that he requires {Admit to Appropriate Level of Care:15885:::0} for {GREATER/LESS:944830409} 30 days.      Update Admission H&P: {CHP DME Changes in HandP:636486366:::0}    PHYSICIAN SIGNATURE:  {Esignature:532300343:::0}

## 2021-04-27 NOTE — PROGRESS NOTES
04/26/21 5183   NIV Type   $NIV $Daily Charge   Skin Assessment Clean, dry, & intact   Skin Protection for O2 Device Yes   Location Nose   NIV Started/Stopped On   Equipment Type v60   Mode CPAP   Mask Type Full face mask   Mask Size Medium   Settings/Measurements   CPAP/EPAP 12 cmH2O   Resp 14   FiO2  30 %   Vt Exhaled 755 mL   Minute Volume 7.6 Liters   Mask Leak (lpm) 9 lpm   Comfort Level Good   Using Accessory Muscles No   SpO2 98   Alarm Settings   Alarms On Y   Press Low Alarm 6 cmH2O   High Pressure Alarm 25 cmH2O   Apnea (secs) 20 secs   Resp Rate Low Alarm 6   High Respiratory Rate 40 br/min

## 2021-04-27 NOTE — PROGRESS NOTES
04/27/21 0325   NIV Type   Skin Protection for O2 Device Yes   Location Nose   Equipment Type v60   Mode CPAP   Mask Type Full face mask   Mask Size Medium   Settings/Measurements   CPAP/EPAP 12 cmH2O   Resp 15   FiO2  30 %   Vt Exhaled 472 mL   Minute Volume 7 Liters   Mask Leak (lpm) 15 lpm   Comfort Level Good   Using Accessory Muscles No   SpO2 96   Alarm Settings   Alarms On Y   Press Low Alarm 6 cmH2O   High Pressure Alarm 25 cmH2O   Apnea (secs) 20 secs   Resp Rate Low Alarm 6   High Respiratory Rate 40 br/min

## 2021-04-27 NOTE — CARE COORDINATION
At this time, pt has no DCP needs. Nephro notes w/HD amount taken off faxed to [facility]. CM will continue to follow in case IV ABX ordered. Pt has transport home.   Peri Baca RN

## 2021-04-28 ENCOUNTER — CARE COORDINATION (OUTPATIENT)
Dept: CASE MANAGEMENT | Age: 51
End: 2021-04-28

## 2021-04-28 NOTE — CARE COORDINATION
[facility] [address] Transitions Initial Follow Up Call    Call within 2 business days of discharge: Yes    Patient: Kirk Garcia Patient : 1970   MRN: [id number]  Reason for Admission: Cellulitis of hand   Discharge Date: 21 RARS: Readmission Risk Score: 72      Last Discharge [facility]       Complaint Diagnosis Description Type Department Provider    21 Hand Pain Cellulitis of hand . .. ED to Hosp-Admission (Discharged) (ADMITTED) Donna Cotton MD; Jordon Juárez. .. Attempted to reach patient via phone for initial post hospital transition call. VM left stating purpose of call along with my contact information requesting a return call.           Care Transitions 24 Hour Call    Do you have all of your prescriptions and are they filled?: Yes  Post Acute Services:  [facility] Transitions Interventions         Follow Up  Future Appointments   Date Time Provider Nelly Darby   5/3/2021  4:00 PM MD SUSANA Eldridge   2021  9:00 AM JAY Arias - ILAN [facility]       Leobardo Mcleod RN

## 2021-04-29 ENCOUNTER — CARE COORDINATION (OUTPATIENT)
Dept: CASE MANAGEMENT | Age: 51
End: 2021-04-29

## 2021-04-29 NOTE — CARE COORDINATION
[facility] Transitions Initial Follow Up Call    Call within 2 business days of discharge: Yes    Patient: Nicko Reed Patient : 1970   MRN: [id number]  Reason for Admission: cellulitis of hand   Discharge Date: 21 RARS: Readmission Risk Score: 72      Last Discharge [facility]       Complaint Diagnosis Description Type Department Provider    21 Hand Pain Cellulitis of hand . .. ED to Hosp-Admission (Discharged) (ADMITTED) Zuhair Dodson MD; Becky Pedraza. .. Second and final attempt made to reach patient for initial post hospital transition call. VM left stating purpose of call along with my contact information requesting a return call.               Follow Up  Future Appointments   Date Time Provider Nelly Darby   5/3/2021  4:00 PM MD SUSANA Reeves [facility]   2021  9:00 AM JAY Gottlieb - CNP [facility] ROB[facility]       Gucci Durbin, RN

## 2021-05-03 ENCOUNTER — VIRTUAL VISIT (OUTPATIENT)
Dept: FAMILY MEDICINE CLINIC | Age: 51
End: 2021-05-03
Payer: COMMERCIAL

## 2021-05-03 DIAGNOSIS — L03.90 CELLULITIS, UNSPECIFIED CELLULITIS SITE: ICD-10-CM

## 2021-05-03 DIAGNOSIS — L03.119 CELLULITIS AND ABSCESS OF HAND: Primary | ICD-10-CM

## 2021-05-03 DIAGNOSIS — L02.519 CELLULITIS AND ABSCESS OF HAND: Primary | ICD-10-CM

## 2021-05-03 DIAGNOSIS — G89.4 CHRONIC PAIN SYNDROME: ICD-10-CM

## 2021-05-03 PROCEDURE — 99214 OFFICE O/P EST MOD 30 MIN: CPT | Performed by: FAMILY MEDICINE

## 2021-05-03 PROCEDURE — 4004F PT TOBACCO SCREEN RCVD TLK: CPT | Performed by: FAMILY MEDICINE

## 2021-05-03 PROCEDURE — G8417 CALC BMI ABV UP PARAM F/U: HCPCS | Performed by: FAMILY MEDICINE

## 2021-05-03 PROCEDURE — 3017F COLORECTAL CA SCREEN DOC REV: CPT | Performed by: FAMILY MEDICINE

## 2021-05-03 PROCEDURE — G8427 DOCREV CUR MEDS BY ELIG CLIN: HCPCS | Performed by: FAMILY MEDICINE

## 2021-05-03 PROCEDURE — 1111F DSCHRG MED/CURRENT MED MERGE: CPT | Performed by: FAMILY MEDICINE

## 2021-05-03 RX ORDER — DILTIAZEM HYDROCHLORIDE 180 MG/1
CAPSULE, COATED, EXTENDED RELEASE ORAL
Status: ON HOLD | COMMUNITY
Start: 2021-04-15 | End: 2021-09-12 | Stop reason: HOSPADM

## 2021-05-03 RX ORDER — CYCLOBENZAPRINE HCL 10 MG
TABLET ORAL
Qty: 90 TABLET | Refills: 5 | Status: SHIPPED | OUTPATIENT
Start: 2021-05-03 | End: 2021-10-18

## 2021-05-03 RX ORDER — CYCLOBENZAPRINE HCL 10 MG
TABLET ORAL
COMMUNITY
Start: 2021-04-02 | End: 2021-05-03 | Stop reason: SDUPTHER

## 2021-05-03 RX ORDER — OXYCODONE AND ACETAMINOPHEN 7.5; 325 MG/1; MG/1
1 TABLET ORAL
Qty: 150 TABLET | Refills: 0 | Status: SHIPPED | OUTPATIENT
Start: 2021-05-03 | End: 2021-05-28 | Stop reason: SDUPTHER

## 2021-05-03 RX ORDER — GABAPENTIN 100 MG/1
CAPSULE ORAL
Qty: 540 CAPSULE | Refills: 10 | Status: SHIPPED | OUTPATIENT
Start: 2021-05-03 | End: 2021-05-07 | Stop reason: SDUPTHER

## 2021-05-03 ASSESSMENT — ENCOUNTER SYMPTOMS: BACK PAIN: 1

## 2021-05-03 NOTE — PROGRESS NOTES
Norvel Cockayne (:  1970) is a 46 y.o. male,{New vs Established:[id number]::\"Established patient\"}, here for evaluation of the following chief complaint(s):  Follow-Up from Hospital, Back Pain, and Hip Pain      ASSESSMENT/PLAN:  {There are no diagnoses linked to this encounter. (Refresh or delete this SmartLink)}    No follow-ups on file. SUBJECTIVE/OBJECTIVE:  46 y.o. male who presented to [facility] with above complaint. He has been having some pain over the left hand for last 1 week and is started to swell and pain became worse for last few days. Thus the reason he came to the emergency room. He denies any injury or bite or break. There is no skin opening. He does not have fever nausea vomiting or change in mental status. He does not have cough shortness of breath fever tingling or numbness over the extremities    Back Pain    Hip Pain         Review of Systems   Musculoskeletal: Positive for back pain. Physical Exam      {Time Documentation Optional:[id number]}      An electronic signature was used to authenticate this note.     --Fredis Stahl MD

## 2021-05-07 ENCOUNTER — VIRTUAL VISIT (OUTPATIENT)
Dept: FAMILY MEDICINE CLINIC | Age: 51
End: 2021-05-07
Payer: COMMERCIAL

## 2021-05-07 DIAGNOSIS — R05.9 COUGH: ICD-10-CM

## 2021-05-07 DIAGNOSIS — Z13.31 POSITIVE DEPRESSION SCREENING: ICD-10-CM

## 2021-05-07 DIAGNOSIS — J44.1 CHRONIC OBSTRUCTIVE PULMONARY DISEASE WITH ACUTE EXACERBATION (HCC): Primary | ICD-10-CM

## 2021-05-07 DIAGNOSIS — G89.4 CHRONIC PAIN SYNDROME: ICD-10-CM

## 2021-05-07 PROCEDURE — 3017F COLORECTAL CA SCREEN DOC REV: CPT | Performed by: NURSE PRACTITIONER

## 2021-05-07 PROCEDURE — 1111F DSCHRG MED/CURRENT MED MERGE: CPT | Performed by: NURSE PRACTITIONER

## 2021-05-07 PROCEDURE — G8428 CUR MEDS NOT DOCUMENT: HCPCS | Performed by: NURSE PRACTITIONER

## 2021-05-07 PROCEDURE — 99213 OFFICE O/P EST LOW 20 MIN: CPT | Performed by: NURSE PRACTITIONER

## 2021-05-07 PROCEDURE — G8431 POS CLIN DEPRES SCRN F/U DOC: HCPCS | Performed by: NURSE PRACTITIONER

## 2021-05-07 RX ORDER — PREDNISONE 20 MG/1
TABLET ORAL
Qty: 18 TABLET | Refills: 0 | Status: ON HOLD | OUTPATIENT
Start: 2021-05-07 | End: 2021-09-03 | Stop reason: HOSPADM

## 2021-05-07 RX ORDER — AMOXICILLIN AND CLAVULANATE POTASSIUM 875; 125 MG/1; MG/1
1 TABLET, FILM COATED ORAL 2 TIMES DAILY
Qty: 20 TABLET | Refills: 0 | Status: SHIPPED | OUTPATIENT
Start: 2021-05-07 | End: 2021-05-17

## 2021-05-07 RX ORDER — TRAZODONE HYDROCHLORIDE 150 MG/1
TABLET ORAL
Qty: 30 TABLET | Refills: 10 | Status: SHIPPED | OUTPATIENT
Start: 2021-05-07 | End: 2021-09-29

## 2021-05-07 RX ORDER — GUAIFENESIN AND CODEINE PHOSPHATE 100; 10 MG/5ML; MG/5ML
5 SOLUTION ORAL 2 TIMES DAILY PRN
Qty: 70 ML | Refills: 0 | Status: SHIPPED | OUTPATIENT
Start: 2021-05-07 | End: 2021-05-12 | Stop reason: SDUPTHER

## 2021-05-07 RX ORDER — GABAPENTIN 100 MG/1
CAPSULE ORAL
Qty: 540 CAPSULE | Refills: 1 | Status: SHIPPED | OUTPATIENT
Start: 2021-05-07 | End: 2021-05-25

## 2021-05-07 ASSESSMENT — PATIENT HEALTH QUESTIONNAIRE - PHQ9
6. FEELING BAD ABOUT YOURSELF - OR THAT YOU ARE A FAILURE OR HAVE LET YOURSELF OR YOUR FAMILY DOWN: 3
3. TROUBLE FALLING OR STAYING ASLEEP: 3
SUM OF ALL RESPONSES TO PHQ QUESTIONS 1-9: 21
7. TROUBLE CONCENTRATING ON THINGS, SUCH AS READING THE NEWSPAPER OR WATCHING TELEVISION: 3
1. LITTLE INTEREST OR PLEASURE IN DOING THINGS: 3
SUM OF ALL RESPONSES TO PHQ QUESTIONS 1-9: 21
4. FEELING TIRED OR HAVING LITTLE ENERGY: 3
2. FEELING DOWN, DEPRESSED OR HOPELESS: 3
SUM OF ALL RESPONSES TO PHQ9 QUESTIONS 1 & 2: 6

## 2021-05-07 ASSESSMENT — ENCOUNTER SYMPTOMS
COUGH: 1
GASTROINTESTINAL NEGATIVE: 1
SHORTNESS OF BREATH: 1
WHEEZING: 1

## 2021-05-07 NOTE — PROGRESS NOTES
2021    TELEHEALTH EVALUATION -- Audio/Visual ([facility])    HPI:    Alf Palafox (:  1970) has requested an audio/video evaluation for the following concern(s):    Patient presents today with sinus concerns. He has congestion and cough. He feels the cough is causing headaches. Feels like fire in his chest. He had had both covid vaccines. Symptoms started this past week. He tried some OTC sinus Medications with out relief. He has a history of asthma and COPD. He has been using his nebulizer and inhaler. Review of Systems   Constitutional: Negative. HENT: Negative. Respiratory: Positive for cough, shortness of breath and wheezing. Cardiovascular: Negative. Gastrointestinal: Negative. Musculoskeletal: Negative. Skin: Negative. Neurological: Positive for light-headedness. Negative for dizziness. Prior to Visit Medications    Medication Sig Taking? Authorizing Provider   traZODone (DESYREL) 150 MG tablet TAKE (1) TABLET BY MOUTH NIGHTLY Yes JAY Mendez CNP   gabapentin (NEURONTIN) 100 MG capsule TAKE 1 TO 2 CAPSULES BY MOUTH THREE TIMES A DAY Yes JAY Mendez CNP   amoxicillin-clavulanate (AUGMENTIN) 875-125 MG per tablet Take 1 tablet by mouth 2 times daily for 10 days Yes JAY Glaser CNP   predniSONE (DELTASONE) 20 MG tablet Take 3 tablets daily for 3 days, 2 tablets daily for 3 days and 1 tablet daily for 3 days. Yes JAY Glaser CNP   guaiFENesin-codeine (CHERATUSSIN AC) 100-10 MG/5ML syrup Take 5 mLs by mouth 2 times daily as needed for Cough for up to 7 days.  Yes JAY Glaser CNP   simethicone (MYLICON) 80 MG chewable tablet Take 1 tablet by mouth every 6 hours as needed (cramping) Yes Jeff Blanchard MD   dilTIAZem (CARDIZEM CD) 180 MG extended release capsule   Historical Provider, MD   oxyCODONE-acetaminophen (PERCOCET) 7.5-325 MG per tablet Take 1 tablet by mouth every 4-6 disintegrating tablet Take 1 tablet by mouth every 8 hours as needed for Nausea  Mady Freire MD   LINZESS 145 MCG capsule TAKE 1 CAPSULE BY MOUTH EVERY MORNING BEFORE BREAKFAST  Mady Freire MD   Calcium Acetate, Phos Binder, 667 MG CAPS TAKE 1 CAPSULE BY MOUTH THREE TIMES DAILY WITH MEALS  Mady Freire MD   albuterol (PROVENTIL) (2.5 MG/3ML) 0.083% nebulizer solution INHALE 1 VIAL VIA NEBULIZER EVERY 6 HOURS AS NEEDED FOR WHEEZING  Mady Freire MD   blood glucose test strips (FREESTYLE LITE) strip Daily As needed. Mady Freire MD   glucose monitoring kit (FREESTYLE) monitoring kit 1 kit by Does not apply route daily  Mady Freire MD   vitamin D (ERGOCALCIFEROL) 43796 units CAPS capsule TK 1 C PO WEEKLY  Historical Provider, MD   flunisolide (NASALIDE) 25 MCG/ACT (0.025%) SOLN Inhale 2 sprays into the lungs every 12 hours  Vikas Williamson MD   Tiotropium Bromide-Olodaterol (STIOLTO RESPIMAT) 2.5-2.5 MCG/ACT AERS Inhale 2 puffs into the lungs daily  Vikas Williamson MD   Polyethylene Glycol 3350 GRAN   Historical Provider, MD   Glucose Blood (BLOOD GLUCOSE TEST STRIPS) STRP TEST 3-4 TIMES DAILY, AS DIRECTED  Casey Perez MD   Blood Glucose Monitoring Suppl ADAM USE AS DIRECTED. Casey Perez MD   Alcohol Swabs PADS USE AS DIRECTED  Casey Perez MD   albuterol sulfate  (90 Base) MCG/ACT inhaler Inhale 2 puffs into the lungs every 6 hours as needed for Wheezing  Rob Coats MD   ipratropium-albuterol (DUONEB) 0.5-2.5 (3) MG/3ML SOLN nebulizer solution Inhale 3 mLs into the lungs every 6 hours as needed for Shortness of Breath  Pawan Kirk MD   calcium carbonate (TUMS) 500 MG chewable tablet Take 1 tablet by mouth 3 times daily as needed for Heartburn. Historical Provider, MD   aspirin 81 MG chewable tablet Take 1 tablet by mouth daily.   Patient taking differently: Take 81 mg by mouth daily Indications: stopped on 6/25 for surgery   Marcial Ruiz MD       Social History Tobacco Use    Smoking status: Current Every Day Smoker     Packs/day: 0.50     Years: 33.00     Pack years: 16.50     Types: Cigarettes     Last attempt to quit: 2020     Years since quittin.0    Smokeless tobacco: Never Used   Substance Use Topics    Alcohol use: Not Currently     Alcohol/week: 0.0 standard drinks     Comment: occ    Drug use: No            PHYSICAL EXAMINATION:  [ INSTRUCTIONS:  \"[x]\" Indicates a positive item  \"[]\" Indicates a negative item  -- DELETE ALL ITEMS NOT EXAMINED]  Vital Signs: (As obtained by patient/caregiver or practitioner observation)    Blood pressure-  Heart rate-    Respiratory rate-    Temperature-  Pulse oximetry-     Constitutional: [x] Appears well-developed and well-nourished [x] No apparent distress      [] Abnormal-   Mental status  [x] Alert and awake  [x] Oriented to person/place/time []Able to follow commands      Eyes:  EOM    []  Normal  [] Abnormal-  Sclera  []  Normal  [] Abnormal -         Discharge []  None visible  [] Abnormal -    HENT:   [x] Normocephalic, atraumatic. [] Abnormal   [x] Mouth/Throat: Mucous membranes are moist.     External Ears [] Normal  [] Abnormal-     Neck: [] No visualized mass     Pulmonary/Chest: [x] Respiratory effort normal.  [] No visualized signs of difficulty breathing or respiratory distress        [x] Abnormal-  Can hear productive cough     Musculoskeletal:   [] Normal gait with no signs of ataxia         [x] Normal range of motion of neck        [] Abnormal-       Neurological:        [] No Facial Asymmetry (Cranial nerve 7 motor function) (limited exam to video visit)          [] No gaze palsy        [] Abnormal-         Skin:        [x] No significant exanthematous lesions or discoloration noted on facial skin         [] Abnormal-            Psychiatric:       [x] Normal Affect [] No Hallucinations        [] Abnormal-    PHQ-9 is elevated- patient will follow up with Dr. Gwyn Tompkins to discuss.    Other pertinent observable physical exam findings-     ASSESSMENT/PLAN:  1. Chronic pain syndrome  Refill sent  - gabapentin (NEURONTIN) 100 MG capsule; TAKE 1 TO 2 CAPSULES BY MOUTH THREE TIMES A DAY  Dispense: 540 capsule; Refill: 1    2. Cough  Will treat with Augmentin and steroid pack and patient requested cough medicine with codeine because it has worked well in the past. He has been on it in the past records. Patient strongly advised not to take it with his percocet. He verbalized his understanding.   - guaiFENesin-codeine (CHERATUSSIN AC) 100-10 MG/5ML syrup; Take 5 mLs by mouth 2 times daily as needed for Cough for up to 7 days. Dispense: 70 mL; Refill: 0    3. Chronic obstructive pulmonary disease with acute exacerbation (HCC)  Treating with augmentin, steroids and cough medicine. Follow up if no improvement. If worse go on to the ER.   - guaiFENesin-codeine (CHERATUSSIN AC) 100-10 MG/5ML syrup; Take 5 mLs by mouth 2 times daily as needed for Cough for up to 7 days. Dispense: 70 mL; Refill: 0    4. Positive depression screening  Will follow up with Dr. Frank Banerjee for Clinical Depression with a Documented Follow-up Plan           Vinicio Neves is a 46 y.o. male being evaluated by a Virtual Visit (video visit) encounter to address concerns as mentioned above. A caregiver was present when appropriate. Due to this being a TeleHealth encounter (During WWKOG-09 public health emergency), evaluation of the following organ systems was limited: Vitals/Constitutional/EENT/Resp/CV/GI//MS/Neuro/Skin/Heme-Lymph-Imm. Pursuant to the emergency declaration under the [facility], [address] [place] [address] authority and the [employer] and [employer] Act, this Virtual Visit was conducted with patient's (and/or legal guardian's) consent, to reduce the patient's risk of exposure to COVID-19 and provide necessary medical care.   The patient (and/or legal guardian) has also been advised to contact this office for worsening conditions or problems, and seek emergency medical treatment and/or call 911 if deemed necessary. Services were provided through a video synchronous discussion virtually to substitute for in-person clinic visit. Patient and provider were located at their individual homes. --JAY Medley CNP on 5/7/2021 at 2:05 PM    An electronic signature was used to authenticate this note. On the basis of positive PHQ-9 screening (PHQ-9 Total Score: 21), the following plan was implemented: Patient will follow up with PCP to discuss further. Patient will follow-up in 2 week(s) with PCP.

## 2021-05-08 ASSESSMENT — ENCOUNTER SYMPTOMS: BACK PAIN: 1

## 2021-05-08 NOTE — PROGRESS NOTES
Patricia Parikh (:  1970) is a 46 y.o. male,Established patient, here for evaluation of the following chief complaint(s): Follow-Up from Hospital, Back Pain, and Hip Pain      ASSESSMENT/PLAN:  1. Cellulitis and abscess of hand  -     Tammie Bradshaw MD, Orthopedic Surgery, [facility]  2. Cellulitis, unspecified cellulitis site  -     oxyCODONE-acetaminophen (PERCOCET) 7.5-325 MG per tablet; Take 1 tablet by mouth every 4-6 hours as needed for Pain for up to 30 days. , Disp-150 tablet, R-0Normal  3. Chronic pain syndrome      No follow-ups on file. SUBJECTIVE/OBJECTIVE:  SUBJECTIVE/OBJECTIVE:  46 y.o. male who presented to [facility] with above complaint. He has been having some pain over the left hand for last 1 week and is started to swell and pain became worse for last few days. Thus the reason he came to the emergency room. He denies any injury or bite or break. There is no skin opening. He does not have fever nausea vomiting or change in mental status. He does not have cough shortness of breath fever tingling or numbness over the extremities      Back Pain    Hip Pain         Review of Systems   Musculoskeletal: Positive for back pain.        Patient-Reported Vitals 2021   Patient-Reported Weight 256lb   Patient-Reported Height 5'9   Patient-Reported Systolic 937   Patient-Reported Diastolic 97   Patient-Reported Pulse 94   Patient-Reported SpO2 97        Physical Exam    [INSTRUCTIONS:  \"[x]\" Indicates a positive item  \"[]\" Indicates a negative item  -- DELETE ALL ITEMS NOT EXAMINED]    Constitutional: [x] Appears well-developed and well-nourished [x] No apparent distress      [] Abnormal -     Mental status: [x] Alert and awake  [x] Oriented to person/place/time [x] Able to follow commands    [] Abnormal -     Eyes:   EOM    [x]  Normal    [] Abnormal -   Sclera  [x]  Normal    [] Abnormal -          Discharge [x]  None visible   [] Abnormal -     HENT: [x] Normocephalic, atraumatic  [] Abnormal -   [x] Mouth/Throat: Mucous membranes are moist    External Ears [x] Normal  [] Abnormal -    Neck: [x] No visualized mass [] Abnormal -     Pulmonary/Chest: [x] Respiratory effort normal   [x] No visualized signs of difficulty breathing or respiratory distress        [] Abnormal -      Musculoskeletal:   [x] Normal gait with no signs of ataxia         [x] Normal range of motion of neck        [] Abnormal -     Neurological:        [x] No Facial Asymmetry (Cranial nerve 7 motor function) (limited exam due to video visit)          [x] No gaze palsy        [] Abnormal -          Skin:        [x] No significant exanthematous lesions or discoloration noted on facial skin         [] Abnormal -            Psychiatric:       [x] Normal Affect [] Abnormal -        [x] No Hallucinations    Other pertinent observable physical exam findings:-          On this date 5/3/2021 I have spent 30 minutes reviewing previous notes, test results and face to face (virtual) with the patient discussing the diagnosis and importance of compliance with the treatment plan as well as documenting on the day of the visit. Veronica Betancourt was evaluated through a synchronous (real-time) audio-video encounter. The patient (or guardian if applicable) is aware that this is a billable service. Verbal consent to proceed has been obtained within the past 12 months. The visit was conducted pursuant to the emergency declaration under the [address] [place] [address] authority and the [employer] and [employer] General Act. Patient identification was verified, and a caregiver was present when appropriate. The patient was located in a state where the provider was credentialed to provide care. An electronic signature was used to authenticate this note.     --Tim Kim MD

## 2021-05-11 DIAGNOSIS — R05.9 COUGH: ICD-10-CM

## 2021-05-11 DIAGNOSIS — J44.1 CHRONIC OBSTRUCTIVE PULMONARY DISEASE WITH ACUTE EXACERBATION (HCC): ICD-10-CM

## 2021-05-11 NOTE — TELEPHONE ENCOUNTER
Kassy, the patient's care manager is calling to request a cough medicine for the patient. She said he has a terrible cough.     guaiFENesin-codeine (CHERATUSSIN AC) 100-10 MG/5ML syrup  Pharmacy [id number]

## 2021-05-12 DIAGNOSIS — R05.9 COUGH: ICD-10-CM

## 2021-05-12 DIAGNOSIS — J44.1 CHRONIC OBSTRUCTIVE PULMONARY DISEASE WITH ACUTE EXACERBATION (HCC): ICD-10-CM

## 2021-05-12 RX ORDER — METOPROLOL SUCCINATE 25 MG/1
25 TABLET, EXTENDED RELEASE ORAL DAILY
Qty: 90 TABLET | Refills: 3 | OUTPATIENT
Start: 2021-05-12

## 2021-05-12 RX ORDER — GUAIFENESIN AND CODEINE PHOSPHATE 100; 10 MG/5ML; MG/5ML
5 SOLUTION ORAL 2 TIMES DAILY PRN
Qty: 70 ML | Refills: 0 | Status: SHIPPED | OUTPATIENT
Start: 2021-05-13 | End: 2021-05-20

## 2021-05-13 NOTE — PROGRESS NOTES
09/20/18 0403   NIV Type   Equipment Type V60   Mode CPAP   Mask Type Full face mask   Mask Size Medium   Settings/Measurements   Comfort Level Good   Using Accessory Muscles No   CPAP 10 cmH2O   Resp 18   SpO2 94   FiO2  25 %   Vt Exhaled 405 mL   Mask Leak (lpm) 16 lpm   Skin Protection for O2 Device Yes   Breath Sounds   Right Upper Lobe Diminished   Right Middle Lobe Diminished   Right Lower Lobe Diminished   Left Upper Lobe Diminished   Left Lower Lobe Diminished   Alarm Settings   Alarms On Y   Press Low Alarm 6 cmH2O   High Pressure Alarm 20 cmH2O   Apnea (secs) 20 secs   Resp Rate Low Alarm 6   High Respiratory Rate 40 br/min --ON ADMIT /131 in setting of non-compliance.    ---150s now.    --CONT: Imdur 90mg PO QD, Coreg 25mg PO BID, Hydralazine 100mg PO TID, Amlodipine 10mg PO QD.    --HOLD: home Losartan 100mg PO QD and home Clonidine 0.1mg PO BID.

## 2021-05-24 RX ORDER — HYDRALAZINE HYDROCHLORIDE 50 MG/1
TABLET, FILM COATED ORAL
Qty: 90 TABLET | Refills: 2 | Status: SHIPPED | OUTPATIENT
Start: 2021-05-24 | End: 2021-10-05 | Stop reason: SDUPTHER

## 2021-05-24 NOTE — TELEPHONE ENCOUNTER
Last Office Visit  -  5/7/21  Next Office Visit  -  n/a    Last Filled  -    Last UDS -    Contract -

## 2021-05-25 DIAGNOSIS — K59.04 CHRONIC IDIOPATHIC CONSTIPATION: ICD-10-CM

## 2021-05-25 DIAGNOSIS — G89.4 CHRONIC PAIN SYNDROME: ICD-10-CM

## 2021-05-25 RX ORDER — GABAPENTIN 100 MG/1
CAPSULE ORAL
Qty: 180 CAPSULE | Refills: 5 | Status: SHIPPED | OUTPATIENT
Start: 2021-05-25 | End: 2021-10-26 | Stop reason: SDUPTHER

## 2021-05-25 RX ORDER — LINACLOTIDE 145 UG/1
CAPSULE, GELATIN COATED ORAL
Qty: 30 CAPSULE | Refills: 10 | Status: SHIPPED | OUTPATIENT
Start: 2021-05-25 | End: 2022-04-27

## 2021-05-26 RX ORDER — HYDROXYZINE PAMOATE 50 MG/1
CAPSULE ORAL
Qty: 60 CAPSULE | Refills: 5 | Status: ON HOLD | OUTPATIENT
Start: 2021-05-26 | End: 2021-10-15 | Stop reason: HOSPADM

## 2021-05-28 ENCOUNTER — VIRTUAL VISIT (OUTPATIENT)
Dept: FAMILY MEDICINE CLINIC | Age: 51
End: 2021-05-28
Payer: COMMERCIAL

## 2021-05-28 DIAGNOSIS — J22 LOWER RESPIRATORY INFECTION: ICD-10-CM

## 2021-05-28 DIAGNOSIS — G62.9 PERIPHERAL POLYNEUROPATHY: ICD-10-CM

## 2021-05-28 DIAGNOSIS — K21.9 GASTROESOPHAGEAL REFLUX DISEASE WITHOUT ESOPHAGITIS: Primary | ICD-10-CM

## 2021-05-28 DIAGNOSIS — B37.2 YEAST DERMATITIS: ICD-10-CM

## 2021-05-28 DIAGNOSIS — L03.90 CELLULITIS, UNSPECIFIED CELLULITIS SITE: ICD-10-CM

## 2021-05-28 PROCEDURE — G8427 DOCREV CUR MEDS BY ELIG CLIN: HCPCS | Performed by: FAMILY MEDICINE

## 2021-05-28 PROCEDURE — 3017F COLORECTAL CA SCREEN DOC REV: CPT | Performed by: FAMILY MEDICINE

## 2021-05-28 PROCEDURE — 99214 OFFICE O/P EST MOD 30 MIN: CPT | Performed by: FAMILY MEDICINE

## 2021-05-28 RX ORDER — DOXYCYCLINE HYCLATE 100 MG
100 TABLET ORAL 2 TIMES DAILY
Qty: 20 TABLET | Refills: 0 | Status: SHIPPED | OUTPATIENT
Start: 2021-05-28 | End: 2021-06-07

## 2021-05-28 RX ORDER — NYSTATIN 100000 U/G
CREAM TOPICAL
Qty: 60 G | Refills: 3 | Status: SHIPPED | OUTPATIENT
Start: 2021-05-28 | End: 2021-10-05

## 2021-05-28 RX ORDER — FAMOTIDINE 20 MG/1
20 TABLET, FILM COATED ORAL 2 TIMES DAILY PRN
Qty: 60 TABLET | Refills: 0 | Status: SHIPPED | OUTPATIENT
Start: 2021-05-28 | End: 2021-05-28

## 2021-05-28 RX ORDER — FAMOTIDINE 20 MG/1
TABLET, FILM COATED ORAL
Qty: 180 TABLET | Refills: 0 | Status: ON HOLD | OUTPATIENT
Start: 2021-05-28 | End: 2022-01-17 | Stop reason: HOSPADM

## 2021-05-28 RX ORDER — OXYCODONE AND ACETAMINOPHEN 7.5; 325 MG/1; MG/1
1 TABLET ORAL
Qty: 150 TABLET | Refills: 0 | Status: SHIPPED | OUTPATIENT
Start: 2021-05-28 | End: 2021-06-22 | Stop reason: SDUPTHER

## 2021-05-28 RX ORDER — PREGABALIN 150 MG/1
150 CAPSULE ORAL 3 TIMES DAILY
Qty: 60 CAPSULE | Refills: 5 | Status: ON HOLD | OUTPATIENT
Start: 2021-05-28 | End: 2021-06-29 | Stop reason: HOSPADM

## 2021-05-28 ASSESSMENT — ENCOUNTER SYMPTOMS: COUGH: 1

## 2021-05-28 NOTE — PROGRESS NOTES
Irene Francisco (:  1970) is a 46 y.o. male,Established patient, here for evaluation of the following chief complaint(s): Cough (productive multiple colors ) and Rash         ASSESSMENT/PLAN:  1. Gastroesophageal reflux disease without esophagitis  Famotidine was prescribed today. 2. Yeast dermatitis  -     nystatin (MYCOSTATIN) 017136 UNIT/GM cream; Apply topically 2 times daily. , Disp-60 g, R-3, Normal  3. Cellulitis, unspecified cellulitis site  -     oxyCODONE-acetaminophen (PERCOCET) 7.5-325 MG per tablet; Take 1 tablet by mouth every 4-6 hours as needed for Pain for up to 30 days. , Disp-150 tablet, R-0Normal  4. Peripheral polyneuropathy  -     pregabalin (LYRICA) 150 MG capsule; Take 1 capsule by mouth 3 times daily for 30 days. , Disp-60 capsule, R-5Normal  5. Lower respiratory infection  -     doxycycline hyclate (VIBRA-TABS) 100 MG tablet; Take 1 tablet by mouth 2 times daily for 10 days, Disp-20 tablet, R-0Normal      No follow-ups on file. SUBJECTIVE/OBJECTIVE:  Irene Francisco is a 46 y.o. male. Patient presents with:  Cough: productive multiple colors   Rash      45 yo  Rash on Groin:  Back and on both sides  Cough: At night, colored. Some shortness of breath no fever. Patient notes that he has had no chest pain. Patient notes that it is particularly bad when he is trying to sleep at night. This is been ongoing for several weeks. Is been getting worse. He has no nasal drainage. He has no sinus tenderness. He has no ear pain. Heartburn. The patients PMH, surgical history, family history, medications, allergies were all reviewed and updated as appropriate today. Cough  Associated symptoms include a rash. Rash  Associated symptoms include coughing. Review of Systems   Respiratory: Positive for cough. Skin: Positive for rash.        Patient-Reported Vitals 2021   Patient-Reported Weight 252lb   Patient-Reported Height -   Patient-Reported Systolic - Patient-Reported Diastolic -   Patient-Reported Pulse -   Patient-Reported SpO2 -        Physical Exam    [INSTRUCTIONS:  \"[x]\" Indicates a positive item  \"[]\" Indicates a negative item  -- DELETE ALL ITEMS NOT EXAMINED]    Constitutional: [x] Appears well-developed and well-nourished [x] No apparent distress      [] Abnormal -     Mental status: [x] Alert and awake  [x] Oriented to person/place/time [x] Able to follow commands    [] Abnormal -     Eyes:   EOM    [x]  Normal    [] Abnormal -   Sclera  [x]  Normal    [] Abnormal -          Discharge [x]  None visible   [] Abnormal -     HENT: [x] Normocephalic, atraumatic  [] Abnormal -   [x] Mouth/Throat: Mucous membranes are moist    External Ears [x] Normal  [] Abnormal -    Neck: [x] No visualized mass [] Abnormal -     Pulmonary/Chest: [x] Respiratory effort normal   [x] No visualized signs of difficulty breathing or respiratory distress        [] Abnormal -      Musculoskeletal:   [x] Normal gait with no signs of ataxia         [x] Normal range of motion of neck        [] Abnormal -     Neurological:        [x] No Facial Asymmetry (Cranial nerve 7 motor function) (limited exam due to video visit)          [x] No gaze palsy        [] Abnormal -          Skin:        [x] No significant exanthematous lesions or discoloration noted on facial skin         [] Abnormal -            Psychiatric:       [x] Normal Affect [] Abnormal -        [x] No Hallucinations    Other pertinent observable physical exam findings:-          On this date 5/28/2021 I have spent 30 minutes reviewing previous notes, test results and face to face (virtual) with the patient discussing the diagnosis and importance of compliance with the treatment plan as well as documenting on the day of the visitJoselyn Muniz, was evaluated through a synchronous (real-time) audio-video encounter. The patient (or guardian if applicable) is aware that this is a billable service.  Verbal consent to proceed has been obtained within the past 12 months. The visit was conducted pursuant to the emergency declaration under the [address] [place] [address] and the [employer] General Act. Patient identification was verified, and a caregiver was present when appropriate. The patient was located in a state where the provider was credentialed to provide care. An electronic signature was used to authenticate this note.     --Camille Adbi MD

## 2021-06-01 ENCOUNTER — TELEPHONE (OUTPATIENT)
Dept: FAMILY MEDICINE CLINIC | Age: 51
End: 2021-06-01

## 2021-06-01 NOTE — TELEPHONE ENCOUNTER
----- Message from Vida Palacios sent at 6/1/2021  9:16 AM EDT -----  Subject: Message to Provider    QUESTIONS  Information for Provider? Patient would like to speak with Dr. Mary De Oliveira in   regards to his sister was just diagnosis with lupus. He says he normal has   anything she has so he wants to know if he should be tested for lupus.   ---------------------------------------------------------------------------  --------------  CALL BACK INFO  What is the best way for the office to contact you? OK to leave message on   voicemail  Preferred Call Back Phone Number? [phone]  ---------------------------------------------------------------------------  --------------  SCRIPT ANSWERS  Relationship to Patient?  Self

## 2021-06-02 ENCOUNTER — NURSE TRIAGE (OUTPATIENT)
Dept: OTHER | Facility: CLINIC | Age: 51
End: 2021-06-02

## 2021-06-02 RX ORDER — ALBUTEROL SULFATE 2.5 MG/3ML
SOLUTION RESPIRATORY (INHALATION)
Qty: 300 ML | Refills: 5 | Status: SHIPPED | OUTPATIENT
Start: 2021-06-02 | End: 2021-11-29

## 2021-06-02 NOTE — TELEPHONE ENCOUNTER
Received call from [place] at [facility] [place] [facility]) [place] with Red Flag Complaint. Brief description of triage: Pt reports having left knee pain 10/10 after falling out of bed this AM. States is bruised with edema. Triage indicates for patient to have appt with PCP today. Care advice provided, patient verbalizes understanding; denies any other questions or concerns; instructed to call back for any new or worsening symptoms. Writer provided warm transfer to [place] at [facility] for appointment scheduling. Attention Provider: Thank you for allowing me to participate in the care of your patient. The patient was connected to triage in response to information provided to the E[facility]. Please do not respond through this encounter as the response is not directed to a shared pool. Reason for Disposition   Followed a knee injury   SEVERE pain (e.g., excruciating)    Answer Assessment - Initial Assessment Questions  1. LOCATION and RADIATION: \"Where is the pain located? \"       Left knee pain    2. QUALITY: \"What does the pain feel like? \"  (e.g., sharp, dull, aching, burning)      Dull / aching    3. SEVERITY: \"How bad is the pain? \" \"What does it keep you from doing? \"   (Scale 1-10; or mild, moderate, severe)    -  MILD (1-3): doesn't interfere with normal activities     -  MODERATE (4-7): interferes with normal activities (e.g., work or school) or awakens from sleep, limping     -  SEVERE (8-10): excruciating pain, unable to do any normal activities, unable to walk      Severe pain 10/10    4. ONSET: \"When did the pain start? \" \"Does it come and go, or is it there all the time? \"      6/2/21 1000    5. RECURRENT: \"Have you had this pain before? \" If so, ask: \"When, and what happened then? \"      Denies    6. SETTING: \"Has there been any recent work, exercise or other activity that involved that part of the body? \"       Storm Rossana out of bed this AM.     7. AGGRAVATING FACTORS: \"What makes the knee pain worse? \" (e.g., walking, climbing stairs, running)      Worse with movement. 8. ASSOCIATED SYMPTOMS: \"Is there any swelling or redness of the knee? \"      Bruising and edema    9. OTHER SYMPTOMS: \"Do you have any other symptoms? \" (e.g., chest pain, difficulty breathing, fever, calf pain)      Deneis    10. PREGNANCY: \"Is there any chance you are pregnant? \" \"When was your last menstrual period? \"        N/A    Protocols used: KNEE PAIN-ADULT-OH, KNEE INJURY-ADULT-OH

## 2021-06-02 NOTE — TELEPHONE ENCOUNTER
Last Office Visit  -  4-  Next Office Visit  -6-    Last Filled    Last UDS -   Contract -     Refill albuterol 0.083% with refills

## 2021-06-02 NOTE — TELEPHONE ENCOUNTER
Pt was Nurse Triaged today 6/2/21. Pt fell out of bed this morning & has Extreme Pain in Left Knee, bruised with edema. Pain Level 10/10.  -All Providers are completely booked as of 1:00pm  today 6/2/21.   -Please Call Back ASAP with Advise.

## 2021-06-03 NOTE — TELEPHONE ENCOUNTER
Pt advised to take OTC IBU and ice the knee. If pain doesn't improve he can go to after hours at [address] [place] [address].

## 2021-06-09 ENCOUNTER — VIRTUAL VISIT (OUTPATIENT)
Dept: FAMILY MEDICINE CLINIC | Age: 51
End: 2021-06-09
Payer: COMMERCIAL

## 2021-06-09 DIAGNOSIS — K64.8 OTHER HEMORRHOIDS: ICD-10-CM

## 2021-06-09 DIAGNOSIS — K59.00 CONSTIPATION, UNSPECIFIED CONSTIPATION TYPE: Primary | ICD-10-CM

## 2021-06-09 DIAGNOSIS — M25.562 PAIN IN BOTH KNEES, UNSPECIFIED CHRONICITY: ICD-10-CM

## 2021-06-09 DIAGNOSIS — M25.561 PAIN IN BOTH KNEES, UNSPECIFIED CHRONICITY: ICD-10-CM

## 2021-06-09 PROCEDURE — 4004F PT TOBACCO SCREEN RCVD TLK: CPT | Performed by: FAMILY MEDICINE

## 2021-06-09 PROCEDURE — 3017F COLORECTAL CA SCREEN DOC REV: CPT | Performed by: FAMILY MEDICINE

## 2021-06-09 PROCEDURE — G8427 DOCREV CUR MEDS BY ELIG CLIN: HCPCS | Performed by: FAMILY MEDICINE

## 2021-06-09 PROCEDURE — G8417 CALC BMI ABV UP PARAM F/U: HCPCS | Performed by: FAMILY MEDICINE

## 2021-06-09 PROCEDURE — 99214 OFFICE O/P EST MOD 30 MIN: CPT | Performed by: FAMILY MEDICINE

## 2021-06-09 RX ORDER — HYDROCORTISONE 25 MG/G
CREAM TOPICAL 2 TIMES DAILY
Qty: 30 G | Refills: 0 | Status: SHIPPED | OUTPATIENT
Start: 2021-06-09 | End: 2021-10-05

## 2021-06-09 RX ORDER — DOCUSATE SODIUM 100 MG/1
100 CAPSULE, LIQUID FILLED ORAL 2 TIMES DAILY
Qty: 60 CAPSULE | Refills: 0 | Status: SHIPPED | OUTPATIENT
Start: 2021-06-09 | End: 2021-10-26 | Stop reason: SDUPTHER

## 2021-06-09 NOTE — PROGRESS NOTES
Eleanor Moreno (:  1970) is a 46 y.o. male,Established patient, here for evaluation of the following chief complaint(s): Knee Pain (BL - rt is worse )         ASSESSMENT/PLAN:  1. Constipation, unspecified constipation type  -     docusate sodium (COLACE) 100 MG capsule; Take 1 capsule by mouth 2 times daily, Disp-60 capsule, R-0Normal  2. Other hemorrhoids  -     hydrocortisone (ANUSOL-HC) 2.5 % CREA rectal cream; Place rectally 2 times daily, Rectal, 2 TIMES DAILY Starting 2021, Disp-30 g, R-0, Normal  3. Pain in both knees, unspecified chronicity      No follow-ups on file. SUBJECTIVE/OBJECTIVE:  Eleanor Moreno is a 46 y.o. male. Patient presents with:  Knee Pain: BL - rt is worse       Left leg swelling, no redness and pain. More so than the right leg. Pain in both knees. Patient notes that this is worsening over time. He is taking diuretics as well as taking off fluid with his dialysis. He is end-stage renal disease. He has been otherwise stable recently without any other significant problems with that. He has increasing constipation really recently and has had some hemorrhoids that have been tender and swollen. Patient notes that these are irritated recently. He has hard stools often. He sometimes has trouble passing his stools and notes some blood in his stool. The patients PMH, surgical history, family history, medications, allergies were all reviewed and updated as appropriate today.         Review of Systems    Patient-Reported Vitals 2021   Patient-Reported Weight 252lb   Patient-Reported Height -   Patient-Reported Systolic -   Patient-Reported Diastolic -   Patient-Reported Pulse -   Patient-Reported SpO2 -        Physical Exam    [INSTRUCTIONS:  \"[x]\" Indicates a positive item  \"[]\" Indicates a negative item  -- DELETE ALL ITEMS NOT EXAMINED]    Constitutional: [x] Appears well-developed and well-nourished [x] No apparent distress      [] Abnormal -     Mental status: [x] Alert and awake  [x] Oriented to person/place/time [x] Able to follow commands    [] Abnormal -     Eyes:   EOM    [x]  Normal    [] Abnormal -   Sclera  [x]  Normal    [] Abnormal -          Discharge [x]  None visible   [] Abnormal -     HENT: [x] Normocephalic, atraumatic  [] Abnormal -   [x] Mouth/Throat: Mucous membranes are moist    External Ears [x] Normal  [] Abnormal -    Neck: [x] No visualized mass [] Abnormal -     Pulmonary/Chest: [x] Respiratory effort normal   [x] No visualized signs of difficulty breathing or respiratory distress        [] Abnormal -      Musculoskeletal:   [x] Normal gait with no signs of ataxia         [x] Normal range of motion of neck        [] Abnormal -     Neurological:        [x] No Facial Asymmetry (Cranial nerve 7 motor function) (limited exam due to video visit)          [x] No gaze palsy        [] Abnormal -          Skin:        [x] No significant exanthematous lesions or discoloration noted on facial skin         [] Abnormal -            Psychiatric:       [x] Normal Affect [] Abnormal -        [x] No Hallucinations    Other pertinent observable physical exam findings:-          On this date 6/9/2021 I have spent 30 minutes reviewing previous notes, test results and face to face (virtual) with the patient discussing the diagnosis and importance of compliance with the treatment plan as well as documenting on the day of the visitJoselyn Moreno was evaluated through a synchronous (real-time) audio-video encounter. The patient (or guardian if applicable) is aware that this is a billable service. Verbal consent to proceed has been obtained within the past 12 months. The visit was conducted pursuant to the emergency declaration under the [facility], [address] [place] [address] authority and the [employer] General Act.   Patient identification was verified, and a caregiver was present when appropriate. The patient was located in a state where the provider was credentialed to provide care. An electronic signature was used to authenticate this note.     --Samuel Quiroga MD

## 2021-06-22 ENCOUNTER — TELEPHONE (OUTPATIENT)
Dept: FAMILY MEDICINE CLINIC | Age: 51
End: 2021-06-22

## 2021-06-22 ENCOUNTER — VIRTUAL VISIT (OUTPATIENT)
Dept: FAMILY MEDICINE CLINIC | Age: 51
End: 2021-06-22
Payer: COMMERCIAL

## 2021-06-22 DIAGNOSIS — G62.9 PERIPHERAL POLYNEUROPATHY: ICD-10-CM

## 2021-06-22 DIAGNOSIS — N18.4 CKD STAGE 4 DUE TO TYPE 2 DIABETES MELLITUS (HCC): ICD-10-CM

## 2021-06-22 DIAGNOSIS — L03.90 CELLULITIS, UNSPECIFIED CELLULITIS SITE: ICD-10-CM

## 2021-06-22 DIAGNOSIS — E11.22 CKD STAGE 4 DUE TO TYPE 2 DIABETES MELLITUS (HCC): ICD-10-CM

## 2021-06-22 DIAGNOSIS — M79.604 PAIN OF RIGHT LOWER EXTREMITY: ICD-10-CM

## 2021-06-22 DIAGNOSIS — M25.559 HIP PAIN: Primary | ICD-10-CM

## 2021-06-22 DIAGNOSIS — M54.50 LUMBAR PAIN: ICD-10-CM

## 2021-06-22 PROCEDURE — G8427 DOCREV CUR MEDS BY ELIG CLIN: HCPCS | Performed by: FAMILY MEDICINE

## 2021-06-22 PROCEDURE — 3017F COLORECTAL CA SCREEN DOC REV: CPT | Performed by: FAMILY MEDICINE

## 2021-06-22 PROCEDURE — 2022F DILAT RTA XM EVC RTNOPTHY: CPT | Performed by: FAMILY MEDICINE

## 2021-06-22 PROCEDURE — 99214 OFFICE O/P EST MOD 30 MIN: CPT | Performed by: FAMILY MEDICINE

## 2021-06-22 PROCEDURE — 3052F HG A1C>EQUAL 8.0%<EQUAL 9.0%: CPT | Performed by: FAMILY MEDICINE

## 2021-06-22 RX ORDER — OXYCODONE AND ACETAMINOPHEN 7.5; 325 MG/1; MG/1
1 TABLET ORAL
Qty: 150 TABLET | Refills: 0 | Status: SHIPPED | OUTPATIENT
Start: 2021-06-22 | End: 2021-07-22 | Stop reason: SDUPTHER

## 2021-06-22 NOTE — TELEPHONE ENCOUNTER
[address] [facility],[address] [facility] [phone] states they can not order Dexcom G6 for patient but they suggested sending the script to a medical supplier named:                                              [facility]. [phone].

## 2021-06-22 NOTE — LETTER
Letter of Medical Necessity    2021        RE: Jelena Ferreira  [address] [address]  : 1970        To whom it may concern: This letter is being provided to support the medical necessity of Farhan Ann's prescription for a Dexcom blood glucose continuous measuring device. David Dooley has the diagnosis of type 2 diabetes mellitus with peripheral neuropathy and chronic kidney disease requiring dialysis who has had poorly controlled blood sugars for some time. Currently using fingersticks and this is not working well for him. Last hemoglobin A1c was 9.0. It is my belief that the [facility] system will allow his blood sugars to get under better control and potentially be life saving in life extending for this patient. *. Should you have any questions or concerns, please feel free to contact my office.     Sincerely      MD Ajith Bernard MD [address] [place] [address]

## 2021-06-22 NOTE — TELEPHONE ENCOUNTER
Pt needs a letter of medical necessity for this as well as a recent visit note stating his need for the Dexcom system. Will you write the letter?

## 2021-06-23 NOTE — PROGRESS NOTES
Ryan Al (:  1970) is a 46 y.o. male,Established patient, here for evaluation of the following chief complaint(s): Leg Pain (severe Rt leg pain )         ASSESSMENT/PLAN:  1. Hip pain  -     XR HIP RIGHT (2-3 VIEWS); Future  -     XR HIP LEFT (2-3 VIEWS); Future  2. Lumbar pain  -     XR LUMBAR SPINE (2-3 VIEWS); Future  3. Pain of right lower extremity  -     XR FEMUR RIGHT (MIN 2 VIEWS); Future  4. Cellulitis, unspecified cellulitis site  -     oxyCODONE-acetaminophen (PERCOCET) 7.5-325 MG per tablet; Take 1 tablet by mouth every 4-6 hours as needed for Pain for up to 30 days. , Disp-150 tablet, R-0Normal  5. Peripheral polyneuropathy  6. CKD stage 4 due to type 2 diabetes mellitus ([facility] [address]  7. DM (diabetes mellitus), secondary, uncontrolled, w/neurologic complic [facility])  Lab Results   Component Value Date    LABA1C 9.0 2021     Lab Results   Component Value Date    .6 2021     This is been poorly controlled. I recommended the patient get a Dexcom system. We will try to work on this. No follow-ups on file. SUBJECTIVE/OBJECTIVE:  Ryan Al is a 46 y.o. male. Patient presents with:  Leg Pain: severe Rt leg pain       Severe pain in leg for several days. Patient notes that this is ongoing. He continues to have poorly controlled diabetes mellitus. He continues on dialysis. His numbers have been okay as far as dialysis. Patient notes that he is having more [employer] creased numbness and tingling in his extremities. He notes that his sugars have been \"all over the place\". Patient has been trying to make sure he is following his diet and recommended medication management but having very hard time controlling his blood sugars. He is currently using a system that is fingersticks for his blood sugars. He is not able to correct his blood sugars significantly. Patient continues to have pain in bilateral hips and down into his legs.   The patients PMH, surgical history, family history, medications,    Leg Pain         Review of Systems    Patient-Reported Vitals 5/28/2021   Patient-Reported Weight 252lb   Patient-Reported Height -   Patient-Reported Systolic -   Patient-Reported Diastolic -   Patient-Reported Pulse -   Patient-Reported SpO2 -        Physical Exam    [INSTRUCTIONS:  \"[x]\" Indicates a positive item  \"[]\" Indicates a negative item  -- DELETE ALL ITEMS NOT EXAMINED]    Constitutional: [x] Appears well-developed and well-nourished [x] No apparent distress      [] Abnormal -     Mental status: [x] Alert and awake  [x] Oriented to person/place/time [x] Able to follow commands    [] Abnormal -     Eyes:   EOM    [x]  Normal    [] Abnormal -   Sclera  [x]  Normal    [] Abnormal -          Discharge [x]  None visible   [] Abnormal -     HENT: [x] Normocephalic, atraumatic  [] Abnormal -   [x] Mouth/Throat: Mucous membranes are moist    External Ears [x] Normal  [] Abnormal -    Neck: [x] No visualized mass [] Abnormal -     Pulmonary/Chest: [x] Respiratory effort normal   [x] No visualized signs of difficulty breathing or respiratory distress        [] Abnormal -      Musculoskeletal:   [x] Normal gait with no signs of ataxia         [x] Normal range of motion of neck        [] Abnormal -     Neurological:        [x] No Facial Asymmetry (Cranial nerve 7 motor function) (limited exam due to video visit)          [x] No gaze palsy        [] Abnormal -          Skin:        [x] No significant exanthematous lesions or discoloration noted on facial skin         [] Abnormal -            Psychiatric:       [x] Normal Affect [] Abnormal -        [x] No Hallucinations    Other pertinent observable physical exam findings:-          On this date 6/22/2021 I have spent 30 minutes reviewing previous notes, test results and face to face (virtual) with the patient discussing the diagnosis and importance of compliance with the treatment plan as well as documenting on the day of the visit. Salina Nishant, was evaluated through a synchronous (real-time) audio-video encounter. The patient (or guardian if applicable) is aware that this is a billable service. Verbal consent to proceed has been obtained within the past 12 months. The visit was conducted pursuant to the emergency declaration under the [address] [place] [address] authority and the [employer] General Act. Patient identification was verified, and a caregiver was present when appropriate. The patient was located in a state where the provider was credentialed to provide care. An electronic signature was used to authenticate this note.     --Anoop Hinkle MD

## 2021-06-25 ENCOUNTER — TELEPHONE (OUTPATIENT)
Dept: FAMILY MEDICINE CLINIC | Age: 51
End: 2021-06-25

## 2021-06-26 ENCOUNTER — APPOINTMENT (OUTPATIENT)
Dept: GENERAL RADIOLOGY | Age: 51
DRG: 637 | End: 2021-06-26
Payer: COMMERCIAL

## 2021-06-26 ENCOUNTER — APPOINTMENT (OUTPATIENT)
Dept: CT IMAGING | Age: 51
DRG: 637 | End: 2021-06-26
Payer: COMMERCIAL

## 2021-06-26 ENCOUNTER — HOSPITAL ENCOUNTER (INPATIENT)
Age: 51
LOS: 2 days | Discharge: HOME HEALTH | DRG: 637 | End: 2021-06-29
Attending: EMERGENCY MEDICINE | Admitting: INTERNAL MEDICINE
Payer: COMMERCIAL

## 2021-06-26 DIAGNOSIS — R41.82 ALTERED MENTAL STATUS, UNSPECIFIED ALTERED MENTAL STATUS TYPE: Primary | ICD-10-CM

## 2021-06-26 DIAGNOSIS — R77.8 ELEVATED TROPONIN: ICD-10-CM

## 2021-06-26 DIAGNOSIS — R73.9 HYPERGLYCEMIA: ICD-10-CM

## 2021-06-26 DIAGNOSIS — N30.01 ACUTE CYSTITIS WITH HEMATURIA: ICD-10-CM

## 2021-06-26 LAB
A/G RATIO: 1.1 (ref 1.1–2.2)
ALBUMIN SERPL-MCNC: 3.5 G/DL (ref 3.4–5)
ALP BLD-CCNC: 119 U/L (ref 40–129)
ALT SERPL-CCNC: 13 U/L (ref 10–40)
AMPHETAMINE SCREEN, URINE: ABNORMAL
ANION GAP SERPL CALCULATED.3IONS-SCNC: 15 MMOL/L (ref 3–16)
AST SERPL-CCNC: 13 U/L (ref 15–37)
BACTERIA: ABNORMAL /HPF
BANDED NEUTROPHILS RELATIVE PERCENT: 9 % (ref 0–7)
BARBITURATE SCREEN URINE: ABNORMAL
BASE EXCESS VENOUS: 1.5 MMOL/L (ref -3–3)
BASOPHILS ABSOLUTE: 0 K/UL (ref 0–0.2)
BASOPHILS RELATIVE PERCENT: 0 %
BENZODIAZEPINE SCREEN, URINE: ABNORMAL
BILIRUB SERPL-MCNC: <0.2 MG/DL (ref 0–1)
BILIRUBIN URINE: NEGATIVE
BLOOD, URINE: ABNORMAL
BUN BLDV-MCNC: 45 MG/DL (ref 7–20)
CALCIUM SERPL-MCNC: 9.1 MG/DL (ref 8.3–10.6)
CANNABINOID SCREEN URINE: ABNORMAL
CARBOXYHEMOGLOBIN: 1.8 % (ref 0–1.5)
CHLORIDE BLD-SCNC: 86 MMOL/L (ref 99–110)
CLARITY: CLEAR
CO2: 25 MMOL/L (ref 21–32)
COCAINE METABOLITE SCREEN URINE: ABNORMAL
COLOR: YELLOW
CREAT SERPL-MCNC: 6.4 MG/DL (ref 0.9–1.3)
EOSINOPHILS ABSOLUTE: 0 K/UL (ref 0–0.6)
EOSINOPHILS RELATIVE PERCENT: 0 %
EPITHELIAL CELLS, UA: ABNORMAL /HPF (ref 0–5)
ETHANOL: NORMAL MG/DL (ref 0–0.08)
GFR AFRICAN AMERICAN: 11
GFR NON-AFRICAN AMERICAN: 9
GLOBULIN: 3.2 G/DL
GLUCOSE BLD-MCNC: 508 MG/DL (ref 70–99)
GLUCOSE BLD-MCNC: 524 MG/DL (ref 70–99)
GLUCOSE BLD-MCNC: 584 MG/DL (ref 70–99)
GLUCOSE BLD-MCNC: 606 MG/DL (ref 70–99)
GLUCOSE URINE: >=1000 MG/DL
HCO3 VENOUS: 25.4 MMOL/L (ref 23–29)
HCT VFR BLD CALC: 33.1 % (ref 40.5–52.5)
HEMOGLOBIN: 11.2 G/DL (ref 13.5–17.5)
KETONES, URINE: NEGATIVE MG/DL
LACTIC ACID, SEPSIS: 3.4 MMOL/L (ref 0.4–1.9)
LEUKOCYTE ESTERASE, URINE: NEGATIVE
LIPASE: 32 U/L (ref 13–60)
LYMPHOCYTES ABSOLUTE: 1.1 K/UL (ref 1–5.1)
LYMPHOCYTES RELATIVE PERCENT: 8 %
Lab: ABNORMAL
MAGNESIUM: 2.4 MG/DL (ref 1.8–2.4)
MCH RBC QN AUTO: 30.2 PG (ref 26–34)
MCHC RBC AUTO-ENTMCNC: 33.8 G/DL (ref 31–36)
MCV RBC AUTO: 89.2 FL (ref 80–100)
METHADONE SCREEN, URINE: ABNORMAL
METHEMOGLOBIN VENOUS: 0.5 %
MICROSCOPIC EXAMINATION: YES
MONOCYTES ABSOLUTE: 0.8 K/UL (ref 0–1.3)
MONOCYTES RELATIVE PERCENT: 6 %
NEUTROPHILS ABSOLUTE: 11.4 K/UL (ref 1.7–7.7)
NEUTROPHILS RELATIVE PERCENT: 77 %
NITRITE, URINE: NEGATIVE
O2 CONTENT, VEN: 11 VOL %
O2 SAT, VEN: 66 %
O2 THERAPY: ABNORMAL
OPIATE SCREEN URINE: ABNORMAL
OXYCODONE URINE: POSITIVE
PCO2, VEN: 37.5 MMHG (ref 40–50)
PDW BLD-RTO: 14.3 % (ref 12.4–15.4)
PERFORMED ON: ABNORMAL
PH UA: 7.5
PH UA: 7.5 (ref 5–8)
PH VENOUS: 7.45 (ref 7.35–7.45)
PHENCYCLIDINE SCREEN URINE: ABNORMAL
PLATELET # BLD: 278 K/UL (ref 135–450)
PMV BLD AUTO: 9 FL (ref 5–10.5)
PO2, VEN: 30.6 MMHG (ref 25–40)
POTASSIUM REFLEX MAGNESIUM: 3.5 MMOL/L (ref 3.5–5.1)
PROPOXYPHENE SCREEN: ABNORMAL
PROTEIN UA: >=300 MG/DL
RBC # BLD: 3.71 M/UL (ref 4.2–5.9)
RBC UA: ABNORMAL /HPF (ref 0–4)
SLIDE REVIEW: ABNORMAL
SODIUM BLD-SCNC: 126 MMOL/L (ref 136–145)
SPECIFIC GRAVITY UA: 1.01 (ref 1–1.03)
TCO2 CALC VENOUS: 27 MMOL/L
TOTAL PROTEIN: 6.7 G/DL (ref 6.4–8.2)
TROPONIN: 0.21 NG/ML
URINE REFLEX TO CULTURE: ABNORMAL
URINE TYPE: ABNORMAL
UROBILINOGEN, URINE: 0.2 E.U./DL
WBC # BLD: 13.2 K/UL (ref 4–11)
WBC UA: ABNORMAL /HPF (ref 0–5)

## 2021-06-26 PROCEDURE — 96375 TX/PRO/DX INJ NEW DRUG ADDON: CPT

## 2021-06-26 PROCEDURE — 83735 ASSAY OF MAGNESIUM: CPT

## 2021-06-26 PROCEDURE — 81001 URINALYSIS AUTO W/SCOPE: CPT

## 2021-06-26 PROCEDURE — 87040 BLOOD CULTURE FOR BACTERIA: CPT

## 2021-06-26 PROCEDURE — 6360000002 HC RX W HCPCS

## 2021-06-26 PROCEDURE — 96365 THER/PROPH/DIAG IV INF INIT: CPT

## 2021-06-26 PROCEDURE — 6360000002 HC RX W HCPCS: Performed by: PHYSICIAN ASSISTANT

## 2021-06-26 PROCEDURE — 83605 ASSAY OF LACTIC ACID: CPT

## 2021-06-26 PROCEDURE — 80053 COMPREHEN METABOLIC PANEL: CPT

## 2021-06-26 PROCEDURE — 99285 EMERGENCY DEPT VISIT HI MDM: CPT

## 2021-06-26 PROCEDURE — 2580000003 HC RX 258: Performed by: EMERGENCY MEDICINE

## 2021-06-26 PROCEDURE — 70450 CT HEAD/BRAIN W/O DYE: CPT

## 2021-06-26 PROCEDURE — 82010 KETONE BODYS QUAN: CPT

## 2021-06-26 PROCEDURE — 85025 COMPLETE CBC W/AUTO DIFF WBC: CPT

## 2021-06-26 PROCEDURE — 80307 DRUG TEST PRSMV CHEM ANLYZR: CPT

## 2021-06-26 PROCEDURE — 6370000000 HC RX 637 (ALT 250 FOR IP): Performed by: EMERGENCY MEDICINE

## 2021-06-26 PROCEDURE — 83690 ASSAY OF LIPASE: CPT

## 2021-06-26 PROCEDURE — 82803 BLOOD GASES ANY COMBINATION: CPT

## 2021-06-26 PROCEDURE — 93005 ELECTROCARDIOGRAM TRACING: CPT | Performed by: EMERGENCY MEDICINE

## 2021-06-26 PROCEDURE — 6360000002 HC RX W HCPCS: Performed by: EMERGENCY MEDICINE

## 2021-06-26 PROCEDURE — 96372 THER/PROPH/DIAG INJ SC/IM: CPT

## 2021-06-26 PROCEDURE — 51702 INSERT TEMP BLADDER CATH: CPT

## 2021-06-26 PROCEDURE — 82077 ASSAY SPEC XCP UR&BREATH IA: CPT

## 2021-06-26 PROCEDURE — 71045 X-RAY EXAM CHEST 1 VIEW: CPT

## 2021-06-26 PROCEDURE — 96361 HYDRATE IV INFUSION ADD-ON: CPT

## 2021-06-26 PROCEDURE — 84484 ASSAY OF TROPONIN QUANT: CPT

## 2021-06-26 PROCEDURE — 96367 TX/PROPH/DG ADDL SEQ IV INF: CPT

## 2021-06-26 RX ORDER — LORAZEPAM 2 MG/ML
INJECTION INTRAMUSCULAR
Status: COMPLETED
Start: 2021-06-26 | End: 2021-06-26

## 2021-06-26 RX ORDER — 0.9 % SODIUM CHLORIDE 0.9 %
2000 INTRAVENOUS SOLUTION INTRAVENOUS ONCE
Status: COMPLETED | OUTPATIENT
Start: 2021-06-26 | End: 2021-06-27

## 2021-06-26 RX ORDER — SODIUM CHLORIDE AND POTASSIUM CHLORIDE .9; .15 G/100ML; G/100ML
1000 SOLUTION INTRAVENOUS CONTINUOUS
Status: DISCONTINUED | OUTPATIENT
Start: 2021-06-26 | End: 2021-06-27

## 2021-06-26 RX ORDER — DEXTROSE MONOHYDRATE 50 MG/ML
100 INJECTION, SOLUTION INTRAVENOUS PRN
Status: DISCONTINUED | OUTPATIENT
Start: 2021-06-26 | End: 2021-06-29 | Stop reason: HOSPADM

## 2021-06-26 RX ORDER — DEXTROSE MONOHYDRATE 25 G/50ML
12.5 INJECTION, SOLUTION INTRAVENOUS PRN
Status: DISCONTINUED | OUTPATIENT
Start: 2021-06-26 | End: 2021-06-29 | Stop reason: HOSPADM

## 2021-06-26 RX ORDER — LORAZEPAM 2 MG/ML
INJECTION INTRAMUSCULAR
Status: DISCONTINUED
Start: 2021-06-26 | End: 2021-06-26

## 2021-06-26 RX ORDER — DEXTROSE MONOHYDRATE 25 G/50ML
12.5 INJECTION, SOLUTION INTRAVENOUS PRN
Status: DISCONTINUED | OUTPATIENT
Start: 2021-06-26 | End: 2021-06-27

## 2021-06-26 RX ORDER — MIDAZOLAM HYDROCHLORIDE 1 MG/ML
5 INJECTION INTRAMUSCULAR; INTRAVENOUS ONCE
Status: COMPLETED | OUTPATIENT
Start: 2021-06-26 | End: 2021-06-26

## 2021-06-26 RX ORDER — NICOTINE POLACRILEX 4 MG
15 LOZENGE BUCCAL PRN
Status: DISCONTINUED | OUTPATIENT
Start: 2021-06-26 | End: 2021-06-29 | Stop reason: HOSPADM

## 2021-06-26 RX ORDER — MIDAZOLAM HYDROCHLORIDE 5 MG/ML
INJECTION INTRAMUSCULAR; INTRAVENOUS
Status: COMPLETED
Start: 2021-06-26 | End: 2021-06-26

## 2021-06-26 RX ORDER — DEXTROSE MONOHYDRATE 50 MG/ML
100 INJECTION, SOLUTION INTRAVENOUS PRN
Status: DISCONTINUED | OUTPATIENT
Start: 2021-06-26 | End: 2021-06-27

## 2021-06-26 RX ORDER — NICOTINE POLACRILEX 4 MG
15 LOZENGE BUCCAL PRN
Status: DISCONTINUED | OUTPATIENT
Start: 2021-06-26 | End: 2021-06-27

## 2021-06-26 RX ORDER — LORAZEPAM 2 MG/ML
2 INJECTION INTRAMUSCULAR ONCE
Status: COMPLETED | OUTPATIENT
Start: 2021-06-26 | End: 2021-06-26

## 2021-06-26 RX ORDER — INSULIN GLARGINE 100 [IU]/ML
70 INJECTION, SOLUTION SUBCUTANEOUS NIGHTLY
Status: DISCONTINUED | OUTPATIENT
Start: 2021-06-26 | End: 2021-06-29 | Stop reason: HOSPADM

## 2021-06-26 RX ADMIN — POTASSIUM CHLORIDE AND SODIUM CHLORIDE 1000 ML: 900; 150 INJECTION, SOLUTION INTRAVENOUS at 23:21

## 2021-06-26 RX ADMIN — LORAZEPAM 1 MG: 2 INJECTION INTRAMUSCULAR at 22:26

## 2021-06-26 RX ADMIN — MIDAZOLAM HYDROCHLORIDE 5 MG: 2 INJECTION, SOLUTION INTRAMUSCULAR; INTRAVENOUS at 23:11

## 2021-06-26 RX ADMIN — SODIUM CHLORIDE 2000 ML: 9 INJECTION, SOLUTION INTRAVENOUS at 21:49

## 2021-06-26 RX ADMIN — LORAZEPAM 2 MG: 2 INJECTION INTRAMUSCULAR; INTRAVENOUS at 22:55

## 2021-06-26 RX ADMIN — INSULIN LISPRO 10 UNITS: 100 INJECTION, SOLUTION INTRAVENOUS; SUBCUTANEOUS at 23:22

## 2021-06-26 RX ADMIN — LORAZEPAM 1 MG: 2 INJECTION INTRAMUSCULAR; INTRAVENOUS at 22:26

## 2021-06-26 RX ADMIN — CEFTRIAXONE SODIUM 1000 MG: 1 INJECTION, POWDER, FOR SOLUTION INTRAMUSCULAR; INTRAVENOUS at 23:22

## 2021-06-26 RX ADMIN — MIDAZOLAM HYDROCHLORIDE 5 MG: 5 INJECTION, SOLUTION INTRAMUSCULAR; INTRAVENOUS at 23:11

## 2021-06-26 ASSESSMENT — PAIN DESCRIPTION - ORIENTATION: ORIENTATION: OTHER (COMMENT)

## 2021-06-26 ASSESSMENT — PAIN DESCRIPTION - PROGRESSION: CLINICAL_PROGRESSION: NOT CHANGED

## 2021-06-26 ASSESSMENT — PAIN DESCRIPTION - PAIN TYPE: TYPE: ACUTE PAIN

## 2021-06-26 ASSESSMENT — PAIN SCALES - GENERAL: PAINLEVEL_OUTOF10: 10

## 2021-06-26 ASSESSMENT — PAIN - FUNCTIONAL ASSESSMENT: PAIN_FUNCTIONAL_ASSESSMENT: PREVENTS OR INTERFERES SOME ACTIVE ACTIVITIES AND ADLS

## 2021-06-26 ASSESSMENT — PAIN DESCRIPTION - LOCATION: LOCATION: BACK

## 2021-06-26 ASSESSMENT — PAIN DESCRIPTION - FREQUENCY: FREQUENCY: CONTINUOUS

## 2021-06-26 ASSESSMENT — PAIN DESCRIPTION - ONSET: ONSET: ON-GOING

## 2021-06-27 PROBLEM — E11.65 TYPE 2 DIABETES MELLITUS WITH HYPERGLYCEMIA (HCC): Status: ACTIVE | Noted: 2021-06-27

## 2021-06-27 PROBLEM — G93.40 ACUTE ENCEPHALOPATHY: Status: ACTIVE | Noted: 2021-06-27

## 2021-06-27 LAB
ANION GAP SERPL CALCULATED.3IONS-SCNC: 15 MMOL/L (ref 3–16)
BASOPHILS ABSOLUTE: 0.1 K/UL (ref 0–0.2)
BASOPHILS RELATIVE PERCENT: 0.5 %
BETA-HYDROXYBUTYRATE: 0.15 MMOL/L (ref 0–0.27)
BUN BLDV-MCNC: 43 MG/DL (ref 7–20)
CALCIUM SERPL-MCNC: 8.8 MG/DL (ref 8.3–10.6)
CHLORIDE BLD-SCNC: 98 MMOL/L (ref 99–110)
CO2: 22 MMOL/L (ref 21–32)
CREAT SERPL-MCNC: 6.3 MG/DL (ref 0.9–1.3)
EKG ATRIAL RATE: 100 BPM
EKG DIAGNOSIS: NORMAL
EKG P AXIS: 74 DEGREES
EKG P-R INTERVAL: 172 MS
EKG Q-T INTERVAL: 374 MS
EKG QRS DURATION: 94 MS
EKG QTC CALCULATION (BAZETT): 482 MS
EKG R AXIS: 267 DEGREES
EKG T AXIS: 29 DEGREES
EKG VENTRICULAR RATE: 100 BPM
EOSINOPHILS ABSOLUTE: 0 K/UL (ref 0–0.6)
EOSINOPHILS RELATIVE PERCENT: 0.2 %
GFR AFRICAN AMERICAN: 11
GFR NON-AFRICAN AMERICAN: 9
GLUCOSE BLD-MCNC: 100 MG/DL (ref 70–99)
GLUCOSE BLD-MCNC: 105 MG/DL (ref 70–99)
GLUCOSE BLD-MCNC: 105 MG/DL (ref 70–99)
GLUCOSE BLD-MCNC: 106 MG/DL (ref 70–99)
GLUCOSE BLD-MCNC: 113 MG/DL (ref 70–99)
GLUCOSE BLD-MCNC: 135 MG/DL (ref 70–99)
GLUCOSE BLD-MCNC: 238 MG/DL (ref 70–99)
GLUCOSE BLD-MCNC: 397 MG/DL (ref 70–99)
GLUCOSE BLD-MCNC: 89 MG/DL (ref 70–99)
GLUCOSE BLD-MCNC: 94 MG/DL (ref 70–99)
HCT VFR BLD CALC: 33.3 % (ref 40.5–52.5)
HEMOGLOBIN: 11.6 G/DL (ref 13.5–17.5)
LACTIC ACID, SEPSIS: 2.4 MMOL/L (ref 0.4–1.9)
LYMPHOCYTES ABSOLUTE: 1.7 K/UL (ref 1–5.1)
LYMPHOCYTES RELATIVE PERCENT: 12.4 %
MAGNESIUM: 2.5 MG/DL (ref 1.8–2.4)
MCH RBC QN AUTO: 30.4 PG (ref 26–34)
MCHC RBC AUTO-ENTMCNC: 34.8 G/DL (ref 31–36)
MCV RBC AUTO: 87.6 FL (ref 80–100)
MONOCYTES ABSOLUTE: 1.4 K/UL (ref 0–1.3)
MONOCYTES RELATIVE PERCENT: 10.1 %
NEUTROPHILS ABSOLUTE: 10.5 K/UL (ref 1.7–7.7)
NEUTROPHILS RELATIVE PERCENT: 76.8 %
PDW BLD-RTO: 14.3 % (ref 12.4–15.4)
PERFORMED ON: ABNORMAL
PERFORMED ON: NORMAL
PERFORMED ON: NORMAL
PHOSPHORUS: 3 MG/DL (ref 2.5–4.9)
PLATELET # BLD: 215 K/UL (ref 135–450)
PMV BLD AUTO: 8.5 FL (ref 5–10.5)
POTASSIUM SERPL-SCNC: 3.5 MMOL/L (ref 3.5–5.1)
RBC # BLD: 3.8 M/UL (ref 4.2–5.9)
SODIUM BLD-SCNC: 135 MMOL/L (ref 136–145)
WBC # BLD: 13.7 K/UL (ref 4–11)

## 2021-06-27 PROCEDURE — 83605 ASSAY OF LACTIC ACID: CPT

## 2021-06-27 PROCEDURE — 6370000000 HC RX 637 (ALT 250 FOR IP): Performed by: INTERNAL MEDICINE

## 2021-06-27 PROCEDURE — 6360000002 HC RX W HCPCS: Performed by: INTERNAL MEDICINE

## 2021-06-27 PROCEDURE — 6360000002 HC RX W HCPCS

## 2021-06-27 PROCEDURE — 1200000000 HC SEMI PRIVATE

## 2021-06-27 PROCEDURE — 90935 HEMODIALYSIS ONE EVALUATION: CPT

## 2021-06-27 PROCEDURE — 83036 HEMOGLOBIN GLYCOSYLATED A1C: CPT

## 2021-06-27 PROCEDURE — 80048 BASIC METABOLIC PNL TOTAL CA: CPT

## 2021-06-27 PROCEDURE — 94640 AIRWAY INHALATION TREATMENT: CPT

## 2021-06-27 PROCEDURE — 85025 COMPLETE CBC W/AUTO DIFF WBC: CPT

## 2021-06-27 PROCEDURE — 83735 ASSAY OF MAGNESIUM: CPT

## 2021-06-27 PROCEDURE — 93010 ELECTROCARDIOGRAM REPORT: CPT | Performed by: INTERNAL MEDICINE

## 2021-06-27 PROCEDURE — 5A1D70Z PERFORMANCE OF URINARY FILTRATION, INTERMITTENT, LESS THAN 6 HOURS PER DAY: ICD-10-PCS | Performed by: INTERNAL MEDICINE

## 2021-06-27 PROCEDURE — 84100 ASSAY OF PHOSPHORUS: CPT

## 2021-06-27 RX ORDER — ONDANSETRON 4 MG/1
4 TABLET, ORALLY DISINTEGRATING ORAL EVERY 8 HOURS PRN
Status: DISCONTINUED | OUTPATIENT
Start: 2021-06-27 | End: 2021-06-29 | Stop reason: HOSPADM

## 2021-06-27 RX ORDER — QUETIAPINE FUMARATE 25 MG/1
50 TABLET, FILM COATED ORAL EVERY EVENING
Status: DISCONTINUED | OUTPATIENT
Start: 2021-06-27 | End: 2021-06-29 | Stop reason: HOSPADM

## 2021-06-27 RX ORDER — LORAZEPAM 2 MG/ML
2 INJECTION INTRAMUSCULAR EVERY 4 HOURS PRN
Status: DISCONTINUED | OUTPATIENT
Start: 2021-06-27 | End: 2021-06-29 | Stop reason: HOSPADM

## 2021-06-27 RX ORDER — LORAZEPAM 2 MG/ML
1 INJECTION INTRAMUSCULAR EVERY 4 HOURS PRN
Status: DISCONTINUED | OUTPATIENT
Start: 2021-06-27 | End: 2021-06-27

## 2021-06-27 RX ORDER — LANOLIN ALCOHOL/MO/W.PET/CERES
3 CREAM (GRAM) TOPICAL NIGHTLY PRN
Status: DISCONTINUED | OUTPATIENT
Start: 2021-06-27 | End: 2021-06-29 | Stop reason: HOSPADM

## 2021-06-27 RX ORDER — HALOPERIDOL 5 MG/ML
INJECTION INTRAMUSCULAR
Status: COMPLETED
Start: 2021-06-27 | End: 2021-06-27

## 2021-06-27 RX ORDER — ISOSORBIDE MONONITRATE 30 MG/1
30 TABLET, EXTENDED RELEASE ORAL DAILY
Status: DISCONTINUED | OUTPATIENT
Start: 2021-06-27 | End: 2021-06-29 | Stop reason: HOSPADM

## 2021-06-27 RX ORDER — DILTIAZEM HYDROCHLORIDE 180 MG/1
180 CAPSULE, COATED, EXTENDED RELEASE ORAL DAILY
Status: DISCONTINUED | OUTPATIENT
Start: 2021-06-27 | End: 2021-06-29 | Stop reason: HOSPADM

## 2021-06-27 RX ORDER — LOSARTAN POTASSIUM 25 MG/1
50 TABLET ORAL DAILY
Status: DISCONTINUED | OUTPATIENT
Start: 2021-06-27 | End: 2021-06-29 | Stop reason: HOSPADM

## 2021-06-27 RX ORDER — HEPARIN SODIUM 5000 [USP'U]/ML
5000 INJECTION, SOLUTION INTRAVENOUS; SUBCUTANEOUS EVERY 8 HOURS SCHEDULED
Status: DISCONTINUED | OUTPATIENT
Start: 2021-06-27 | End: 2021-06-29 | Stop reason: HOSPADM

## 2021-06-27 RX ORDER — LORAZEPAM 2 MG/ML
INJECTION INTRAMUSCULAR
Status: DISPENSED
Start: 2021-06-27 | End: 2021-06-27

## 2021-06-27 RX ORDER — SODIUM CHLORIDE 9 MG/ML
25 INJECTION, SOLUTION INTRAVENOUS PRN
Status: DISCONTINUED | OUTPATIENT
Start: 2021-06-27 | End: 2021-06-29 | Stop reason: HOSPADM

## 2021-06-27 RX ORDER — ACETAMINOPHEN 650 MG/1
650 SUPPOSITORY RECTAL EVERY 6 HOURS PRN
Status: DISCONTINUED | OUTPATIENT
Start: 2021-06-27 | End: 2021-06-29 | Stop reason: HOSPADM

## 2021-06-27 RX ORDER — HYDRALAZINE HYDROCHLORIDE 25 MG/1
25 TABLET, FILM COATED ORAL 3 TIMES DAILY
Status: DISCONTINUED | OUTPATIENT
Start: 2021-06-27 | End: 2021-06-29 | Stop reason: HOSPADM

## 2021-06-27 RX ORDER — PANTOPRAZOLE SODIUM 40 MG/1
40 TABLET, DELAYED RELEASE ORAL
Status: DISCONTINUED | OUTPATIENT
Start: 2021-06-27 | End: 2021-06-29 | Stop reason: HOSPADM

## 2021-06-27 RX ORDER — SODIUM CHLORIDE 0.9 % (FLUSH) 0.9 %
10 SYRINGE (ML) INJECTION PRN
Status: DISCONTINUED | OUTPATIENT
Start: 2021-06-27 | End: 2021-06-29 | Stop reason: HOSPADM

## 2021-06-27 RX ORDER — ONDANSETRON 2 MG/ML
4 INJECTION INTRAMUSCULAR; INTRAVENOUS EVERY 6 HOURS PRN
Status: DISCONTINUED | OUTPATIENT
Start: 2021-06-27 | End: 2021-06-29 | Stop reason: HOSPADM

## 2021-06-27 RX ORDER — CALCIUM ACETATE 667 MG/1
667 CAPSULE ORAL
Status: DISCONTINUED | OUTPATIENT
Start: 2021-06-27 | End: 2021-06-29 | Stop reason: HOSPADM

## 2021-06-27 RX ORDER — ASPIRIN 81 MG/1
81 TABLET, CHEWABLE ORAL DAILY
Status: DISCONTINUED | OUTPATIENT
Start: 2021-06-27 | End: 2021-06-29 | Stop reason: HOSPADM

## 2021-06-27 RX ORDER — ACETAMINOPHEN 325 MG/1
650 TABLET ORAL EVERY 6 HOURS PRN
Status: DISCONTINUED | OUTPATIENT
Start: 2021-06-27 | End: 2021-06-29 | Stop reason: HOSPADM

## 2021-06-27 RX ORDER — ZIPRASIDONE MESYLATE 20 MG/ML
20 INJECTION, POWDER, LYOPHILIZED, FOR SOLUTION INTRAMUSCULAR EVERY 12 HOURS PRN
Status: DISCONTINUED | OUTPATIENT
Start: 2021-06-27 | End: 2021-06-29 | Stop reason: HOSPADM

## 2021-06-27 RX ORDER — CITALOPRAM 20 MG/1
40 TABLET ORAL DAILY
Status: DISCONTINUED | OUTPATIENT
Start: 2021-06-27 | End: 2021-06-29 | Stop reason: HOSPADM

## 2021-06-27 RX ORDER — HALOPERIDOL 5 MG/ML
5 INJECTION INTRAMUSCULAR EVERY 4 HOURS PRN
Status: DISCONTINUED | OUTPATIENT
Start: 2021-06-27 | End: 2021-06-29 | Stop reason: HOSPADM

## 2021-06-27 RX ORDER — PRAVASTATIN SODIUM 40 MG
40 TABLET ORAL NIGHTLY
Status: DISCONTINUED | OUTPATIENT
Start: 2021-06-27 | End: 2021-06-29 | Stop reason: HOSPADM

## 2021-06-27 RX ORDER — HYDRALAZINE HYDROCHLORIDE 20 MG/ML
10 INJECTION INTRAMUSCULAR; INTRAVENOUS EVERY 4 HOURS PRN
Status: DISCONTINUED | OUTPATIENT
Start: 2021-06-27 | End: 2021-06-29 | Stop reason: HOSPADM

## 2021-06-27 RX ORDER — CALCIUM CARBONATE 200(500)MG
1000 TABLET,CHEWABLE ORAL 3 TIMES DAILY PRN
Status: DISCONTINUED | OUTPATIENT
Start: 2021-06-27 | End: 2021-06-29 | Stop reason: HOSPADM

## 2021-06-27 RX ADMIN — LORAZEPAM 2 MG: 2 INJECTION INTRAMUSCULAR; INTRAVENOUS at 09:22

## 2021-06-27 RX ADMIN — HYDRALAZINE HYDROCHLORIDE 10 MG: 20 INJECTION INTRAMUSCULAR; INTRAVENOUS at 05:40

## 2021-06-27 RX ADMIN — HEPARIN SODIUM 5000 UNITS: 5000 INJECTION INTRAVENOUS; SUBCUTANEOUS at 22:56

## 2021-06-27 RX ADMIN — INSULIN LISPRO 8 UNITS: 100 INJECTION, SOLUTION INTRAVENOUS; SUBCUTANEOUS at 00:27

## 2021-06-27 RX ADMIN — HALOPERIDOL LACTATE 5 MG: 5 INJECTION, SOLUTION INTRAMUSCULAR at 16:00

## 2021-06-27 RX ADMIN — HALOPERIDOL LACTATE 5 MG: 5 INJECTION, SOLUTION INTRAMUSCULAR at 00:06

## 2021-06-27 RX ADMIN — TIOTROPIUM BROMIDE AND OLODATEROL 2 PUFF: 3.124; 2.736 SPRAY, METERED RESPIRATORY (INHALATION) at 08:33

## 2021-06-27 RX ADMIN — LORAZEPAM 2 MG: 2 INJECTION INTRAMUSCULAR; INTRAVENOUS at 18:09

## 2021-06-27 RX ADMIN — ZIPRASIDONE MESYLATE 20 MG: 20 INJECTION, POWDER, LYOPHILIZED, FOR SOLUTION INTRAMUSCULAR at 18:44

## 2021-06-27 RX ADMIN — INSULIN GLARGINE 70 UNITS: 100 INJECTION, SOLUTION SUBCUTANEOUS at 00:49

## 2021-06-27 RX ADMIN — LORAZEPAM 1 MG: 2 INJECTION INTRAMUSCULAR; INTRAVENOUS at 01:15

## 2021-06-27 RX ADMIN — HALOPERIDOL LACTATE 5 MG: 5 INJECTION, SOLUTION INTRAMUSCULAR at 11:51

## 2021-06-27 RX ADMIN — ZIPRASIDONE MESYLATE 20 MG: 20 INJECTION, POWDER, LYOPHILIZED, FOR SOLUTION INTRAMUSCULAR at 02:55

## 2021-06-27 RX ADMIN — POTASSIUM CHLORIDE AND SODIUM CHLORIDE 1000 ML: 900; 150 INJECTION, SOLUTION INTRAVENOUS at 08:29

## 2021-06-27 NOTE — ED PROVIDER NOTES
[address] [facility]  ED  EMERGENCY DEPARTMENT ENCOUNTER      Pt Name: Ivon Collado  MRN: [id number]  [place] 1970  Date of evaluation: 6/26/2021  Provider: Wali Roberts MD    CHIEF COMPLAINT       Chief Complaint   Patient presents with    Altered Mental Status     d/t hyperglycemia         HISTORY OF PRESENT ILLNESS   (Location/Symptom, Timing/Onset, Context/Setting, Quality, Duration, Modifying Factors, Severity)  Note limiting factors. Ivon Collado is a 46 y.o. male with past medical history of hypertension, hyperlipidemia, end-stage renal disease on hemodialysis, coronary artery disease with CHF, cardiac valvular disease, diabetes with very poorly controlled blood sugar here today with confusion    Patient is brought to the emergency department today by EMS for reports of altered mental status. He is unsure exactly why he is here but states he is very thirsty. Does note he has been urinating regularly. Review of his electronic medical record shows that he recently had a virtual visit with his primary care physician and was complaining of difficulty controlling his blood sugar. Today he denies any chest pain, shortness of breath or cough. Denies abdominal pain, vomiting or diarrhea. Denies any numbness or tingling but states he is unsure why he is here and just very thirsty. No further meaningful information able to be obtained from patient    HPI    Nursing Notes were reviewed. REVIEW OF SYSTEMS    (2-9 systems for level 4, 10 or more for level 5)     Review of Systems    Please see HPI for pertinent positive and negative review of system findings. A full 10 system ROS was performed and otherwise negative.         PAST MEDICAL HISTORY     Past Medical History:   Diagnosis Date    Ambulatory dysfunction     walker for long distances, SOB with distance    Aortic stenosis     echo 2017    Arthritis     hands and hips    Asthma     Bilateral hilar adenopathy syndrome 6/3/2013  CAD (coronary artery disease)     Dr. Fili Fang [facility]) 04/19/2019    EF= 43%    CH[facility]congest[facility] failure[facility]     Chronic pain     COPD (chronic obstructive pulmonary disease) ([facility]     pulmonology Dr. Rosa Bean    Depression     Diabetes mellitus ([facility]     borderline    Difficult intravenous access     Emphysema of lung ([address]     ESRD (end stage renal disease) on dialysis ([address]     MWF    Fear of needles     Gastric ulcer     GERD (gastroesophageal reflux disease)     Heart valve problem     bicuspic valve    Hemodialysis patient ([address]     History of spinal fracture     work incident    Hx of blood clots     Bilateral lower extremities; stents in place    Hyperlipidemia     Hypertension     MI (myocardial infarction) ([address] 2019    has had 9 MIs. 2019 was the last    Neuromuscular disorder ([facility]     due to CVA    Numbness and tingling in left arm     from fistula    Pneumonia     PONV (postoperative nausea and vomiting)     Prolonged emergence from general anesthesia     States requires more medication than most people    Sleep apnea     Uses CPAP    Stroke ([facility]     7mm thalamic cva 2017 deficts left side, left side weakness    TIA (transient ischemic attack)     Unspecified diseases of blood and blood-forming organs          SURGICAL HISTORY       Past Surgical History:   Procedure Laterality Date    AORTIC VALVE REPLACEMENT N/A 10/15/2019    TRANSCATHETER AORTIC VALVE REPLACEMENT FEMORAL APPROACH performed by nA Johns MD at [address] [place] [address] Right 7/2/2019    PERITONEAL DIALYSIS CATHETER REMOVAL performed by Laura Encinas MD at [facility] COLONOSCOPY  2/29/2015    WN    CORONARY ANGIOPLASTY WITH STENT PLACEMENT  05/26/15    CYST REMOVAL  08/14/2013    EXCISION CYSTS, NECK X2 AND ABDOMINAL benign    DIAGNOSTIC CARDIAC CATH LAB PROCEDURE      DIALYSIS FISTULA CREATION Left 10/30/2017    LEFT BRACHIAL CEPHALIC FISTULA    DIALYSIS FISTULA CREATION Left 3/27/2019    LIGATION  AV FISTULA performed by Loraine Jaramillo MD at [address] [facility], COLON, DIAGNOSTIC      OTHER SURGICAL HISTORY  02/01/2017    laparoscopic cholecystectomy with intraoperative cholangiogram    OTHER SURGICAL HISTORY  2018    PORT PLACEMENT  - vas cath    OTHER SURGICAL HISTORY Bilateral 06/26/2018    laprascopic peritoneal dialysis catheter placement    OTHER SURGICAL HISTORY Right 09/2018    peritoneal dialysis port placed on right side of abdomen    OTHER SURGICAL HISTORY  05/28/2019    PTA/Stenting R External Iliac artery    ND LAP INSERTION TUNNELED INTRAPERITONEAL CATHETER N/A 9/21/2018    LAPAROSCOPIC PERITONEAL DIALYSIS CATHETER REPLACEMENT performed by Mitchell Hidalgo MD at [facility]  01/06/2016    UPPER GASTROINTESTINAL ENDOSCOPY  01/29/2017    possible candida, otherwise normal appearing    VASCULAR SURGERY  aprx 2 years ago    2 stents placed, each side of groin         CURRENT MEDICATIONS       Previous Medications    ALBUTEROL (PROVENTIL) (2.5 MG/3ML) 0.083% NEBULIZER SOLUTION    INHALE 1 VIAL VIA NEBULIZER EVERY 6 HOURS AS NEEDED FOR WHEEZING    ALBUTEROL SULFATE  (90 BASE) MCG/ACT INHALER    Inhale 2 puffs into the lungs every 6 hours as needed for Wheezing    ALCOHOL SWABS PADS    USE AS DIRECTED    ASPIRIN 81 MG CHEWABLE TABLET    Take 1 tablet by mouth daily. B COMPLEX-C-FOLIC ACID (VIRT-CAPS) 1 MG CAPS    TK ONE C PO  QD    BLOOD GLUCOSE MONITORING SUPPL ADAM    USE AS DIRECTED.    BLOOD GLUCOSE TEST STRIPS (FREESTYLE LITE) STRIP    Daily As needed. BLOOD GLUCOSE TEST STRIPS (GLUCOSE METER TEST) STRIP    1 each by In Vitro route 5 times daily As needed.     CALCIUM ACETATE, PHOS BINDER, 667 MG CAPS    TAKE 1 CAPSULE BY MOUTH THREE TIMES DAILY WITH MEALS    CALCIUM CARBONATE (TUMS) 500 MG CHEWABLE TABLET    Take 1 tablet by mouth 3 times daily as needed for Heartburn.     CITALOPRAM (CELEXA) 40 MG TABLET    TAKE (1) TABLET BY MOUTH DAILY    CYCLOBENZAPRINE (FLEXERIL) 10 MG TABLET    TAKE 1 TABLET BY MOUTH EVERY 8 HOURS AS NEEDED    DILTIAZEM (CARDIZEM CD) 180 MG EXTENDED RELEASE CAPSULE        DOCUSATE SODIUM (COLACE) 100 MG CAPSULE    Take 1 capsule by mouth 2 times daily    DULOXETINE (CYMBALTA) 30 MG EXTENDED RELEASE CAPSULE    TAKE 1 CAPSULE BY MOUTH EVERY DAY    FAMOTIDINE (PEPCID) 20 MG TABLET    TAKE 1 TABLET BY MOUTH TWICE DAILY AS NEEDED FOR HEARTBURN    FLUNISOLIDE (NASALIDE) 25 MCG/ACT (0.025%) SOLN    Inhale 2 sprays into the lungs every 12 hours    GABAPENTIN (NEURONTIN) 100 MG CAPSULE    TAKE 1 TO 2 CAPSULES BY MOUTH THREE TIMES A DAY    GLUCOSE BLOOD (BLOOD GLUCOSE TEST STRIPS) STRP    TEST 3-4 TIMES DAILY, AS DIRECTED    GLUCOSE MONITORING KIT (FREESTYLE) MONITORING KIT    1 kit by Does not apply route daily    GLUCOSE MONITORING KIT (FREESTYLE) MONITORING KIT    1 kit by Does not apply route daily    HYDRALAZINE (APRESOLINE) 50 MG TABLET    TAKE 1/2 TABLET BY MOUTH EVERY 8 HOURS    HYDROCORTISONE (ANUSOL-HC) 2.5 % CREA RECTAL CREAM    Place rectally 2 times daily    HYDROXYZINE (VISTARIL) 50 MG CAPSULE    TAKE 1 TO 2 CAPSULES BY MOUTH NIGHTLY    INSULIN ASPART (NOVOLOG FLEXPEN) 100 UNIT/ML INJECTION PEN    Inject 20 Units into the skin 3 times daily (before meals)    INSULIN GLARGINE (BASAGLAR KWIKPEN) 100 UNIT/ML INJECTION PEN    Inject 70 Units into the skin nightly    IPRATROPIUM-ALBUTEROL (DUONEB) 0.5-2.5 (3) MG/3ML SOLN NEBULIZER SOLUTION    Inhale 3 mLs into the lungs every 6 hours as needed for Shortness of Breath    ISOSORBIDE MONONITRATE (IMDUR) 30 MG EXTENDED RELEASE TABLET    TAKE 1 TABLET BY MOUTH EVERY DAY    LINZESS 145 MCG CAPSULE    TAKE 1 CAPSULE BY MOUTH EVERY MORNING BEFORE BREAKFAST    LOSARTAN (COZAAR) 50 MG TABLET    TAKE 1 TABLET BY MOUTH DAILY    NITROGLYCERIN (NITROSTAT) 0.4 MG SL TABLET    DISSOLVE 1 TABLET UNDER THE TONGUE AS NEEDED FOR CHEST PAIN EVERY 5 MINUTES UP TO 3 TIMES. IF NO RELIEF CALL 911. NYSTATIN (MYCOSTATIN) 951346 UNIT/GM CREAM    Apply topically 2 times daily. ONDANSETRON (ZOFRAN ODT) 4 MG DISINTEGRATING TABLET    Take 1 tablet by mouth every 8 hours as needed for Nausea    OXYCODONE-ACETAMINOPHEN (PERCOCET) 7.5-325 MG PER TABLET    Take 1 tablet by mouth every 4-6 hours as needed for Pain for up to 30 days. PANTOPRAZOLE (PROTONIX) 40 MG TABLET    TAKE (1) TABLET BY MOUTH EACH MORNING BEFORE BREAKFAST    POLYETHYLENE GLYCOL 3350 GRAN        PRAVASTATIN (PRAVACHOL) 40 MG TABLET    Take 1 tablet by mouth nightly    PREDNISONE (DELTASONE) 20 MG TABLET    Take 3 tablets daily for 3 days, 2 tablets daily for 3 days and 1 tablet daily for 3 days. PREGABALIN (LYRICA) 150 MG CAPSULE    Take 1 capsule by mouth 3 times daily for 30 days.     QUETIAPINE (SEROQUEL) 50 MG TABLET    Take 1 tablet by mouth every evening    SIMETHICONE (MYLICON) 80 MG CHEWABLE TABLET    Take 1 tablet by mouth every 6 hours as needed (cramping)    TIOTROPIUM BROMIDE-OLODATEROL (STIOLTO RESPIMAT) 2.5-2.5 MCG/ACT AERS    Inhale 2 puffs into the lungs daily    TIZANIDINE (ZANAFLEX) 4 MG TABLET    TAKE 1 TABLET BY MOUTH THREE TIMES DAILY    TRAZODONE (DESYREL) 150 MG TABLET    TAKE (1) TABLET BY MOUTH NIGHTLY    VITAMIN D (ERGOCALCIFEROL) 29609 UNITS CAPS CAPSULE    TK 1 C PO WEEKLY       ALLERGIES     Morphine    FAMILY HISTORY       Family History   Problem Relation Age of Onset    Diabetes Mother     Heart Disease Father     Kidney Disease Sister         stage 4-kidney failure    Cancer Sister     Heart Disease Sister     Obesity Sister     Cancer Sister     Heart Disease Sister     Obesity Sister     Alcohol Abuse Brother           SOCIAL HISTORY       Social History     Socioeconomic History    Marital status:      Spouse name: None    Number of children: None    Years of education: None    Highest education level: None   Occupational History    None   Tobacco Use    Smoking status: Current Every Day Smoker     Packs/day: 0.50     Years: 33.00     Pack years: 16.50     Types: Cigarettes     Last attempt to quit: 2020     Years since quittin.1    Smokeless tobacco: Never Used   Vaping Use    Vaping Use: Never used   Substance and Sexual Activity    Alcohol use: Not Currently     Alcohol/week: 0.0 standard drinks     Comment: occ    Drug use: No    Sexual activity: Yes     Partners: Female     Comment:    Other Topics Concern    None   Social History Narrative    None     Social Determinants of Health     Financial Resource Strain:     Difficulty of Paying Living Expenses:    Food Insecurity:     Worried About Running Out of Food in the Last Year:     Ran Out of Food in the Last Year:    Transportation Needs:     Lack of Transportation (Medical):      Lack of Transportation (Non-Medical):    Physical Activity:     Days of Exercise per Week:     Minutes of Exercise per Session:    Stress:     Feeling of Stress :    Social Connections:     Frequency of Communication with Friends and Family:     Frequency of Social Gatherings with Friends and Family:     Attends Congregation Services:     Active Member of Clubs or Organizations:     Attends Club or Organization Meetings:     Marital Status:    Intimate Partner Violence:     Fear of Current or Ex-Partner:     Emotionally Abused:     Physically Abused:     Sexually Abused:        SCREENINGS    Robert Coma Scale  Eye Opening: Spontaneous  Best Verbal Response: Confused  Best Motor Response: Obeys commands  Robert Coma Scale Score: 14          PHYSICAL EXAM    (up to 7 for level 4, 8 or more for level 5)     ED Triage Vitals   BP Temp Temp Source Pulse Resp SpO2 Height Weight   21 --   130/85 99.6 °F (37.6 °C) Oral 108 20 100 % [place], [facility] [employer]   Phone [phone]   TROPONIN - Abnormal; Notable for the following components:    Troponin 0.21 (*)     All other components within normal limits    Narrative:     Aashish Hess tel. [phone],  Chemistry results called to and read back by NAVJOT Marin, 06/26/2021 22:11,  by Bay Soto  Performed at:  [place] [address] [employer]   Phone [phone]   URINE RT REFLEX TO CULTURE - Abnormal; Notable for the following components:    Glucose, Ur >=1000 (*)     Blood, Urine SMALL (*)     Protein, UA >=300 (*)     All other components within normal limits    Narrative:     Performed at:  [place] [address] [employer]   Phone [phone]   BLOOD GAS, VENOUS - Abnormal; Notable for the following components:    pCO2, Blade 37.5 (*)     Carboxyhemoglobin 1.8 (*)     All other components within normal limits    Narrative:     Performed at:  [place] [address] [employer]   Phone [phone]   [address] - Abnormal; Notable for the following components:    Oxycodone Urine POSITIVE (*)     All other components within normal limits    Narrative:     Performed at:  [place] [address] [employer]   Phone [phone]   MICROSCOPIC URINALYSIS - Abnormal; Notable for the following components:    WBC, UA 6-9 (*)     RBC, UA 11-20 (*)     Bacteria, UA Rare (*)     All other components within normal limits    Narrative:     Performed at:  [place] [address] [employer]   Phone [phone]   POCT GLUCOSE - Abnormal; Notable for the following components:    POC Glucose 584 (*)     All other components within normal limits    Narrative:     Performed at:  [address] [address]   Phone [phone]   POCT GLUCOSE - Abnormal; Notable for the following components:    POC Glucose 524 (*)     All other components within normal limits    Narrative:     Performed at:  [place] [address] [employer]   Phone [phone]   CULTURE, BLOOD 1   CULTURE, BLOOD 2   LIPASE    Narrative:     Luz Arevalo tel. [phone],  Chemistry results called to and read back by NAVJOT Perez, 06/26/2021 22:11,  by Sherrine Koyanagi  Performed at:  [place] [address] [employer]   Phone [phone]   ETHANOL    Narrative:     Jonathon Lema [phone],  Chemistry results called to and read back by NAVJOT Perez, 06/26/2021 22:11,  by Sherrine Koyanagi  Performed at:  [place] [address] [employer]   Phone [phone]   MAGNESIUM    Narrative:     Jonathon Lema [phone],  Chemistry results called to and read back by NAVJOT Perez, 06/26/2021 22:11,  by Sherrine Koyanagi  Performed at:  [place] [address] [employer]   Phone [phone]   BETA-HYDROXYBUTYRATE   LACTATE, SEPSIS   LACTATE, SEPSIS   POCT GLUCOSE   POCT GLUCOSE   POCT GLUCOSE   POCT GLUCOSE   POCT GLUCOSE   POCT GLUCOSE   POCT GLUCOSE   POCT GLUCOSE   POCT GLUCOSE   POCT GLUCOSE   POCT GLUCOSE   POCT GLUCOSE       Interpretation per the Radiologist below, if obtained/available at the time of this note:    CT Head WO Contrast   Final Result   Mild atrophy and mild chronic microischemic disease scattered in the deep   white matter which is unchanged with no acute abnormality seen. XR CHEST PORTABLE   Final Result   Clear lungs. Cardiomegaly. No acute cardiopulmonary abnormality. All other labs/imaging were within normal range or not returned as of this dictation.     EMERGENCY DEPARTMENT COURSE and DIFFERENTIAL DIAGNOSIS/MDM:   Vitals:    Vitals:    06/26/21 2136 06/26/21 2143 06/26/21 2229 06/26/21 2317   BP:   (!) 198/79 (!) 185/93   Pulse:   99 110   Resp:   11 26   Temp: 99.6 °F (37.6 °C)      TempSrc: Oral      SpO2:   94% 94%   Weight:  270 lb (122.5 kg)     Height:           EKG: Sinus rhythm PACs rate 100 bpm with right superior axis and prolonged QTC of 482 ms. No ST elevation. When compared to prior EKG from 1/29/2021 no significant change. Patient presents emergency department today with some confusion altered mental status. Has had difficulty controlling his blood sugars at home. Here, blood glucose 606. Patient is confused will follow basic commands and has no focal neurologic deficit. Overall low suspicion for stroke. Concern for possible DKA but is not acidotic has no significant anion gap. Was started on IV fluids for dehydration. Considered possible insulin drip but as he is not in DKA will manage his hyperglycemia with bolus short acting insulin dosing. Was given 10 units of lispro here. Ultimately found to have questionable urinary tract infection with elevated white blood cells, bacteria in his urine. Was given a dose of Rocephin. Due to his agitation did require dose of Ativan here but otherwise no acute infection present. Head CT was negative. Will admit for further treatment. Was found to have an elevated troponin is having no chest pain at present. EKG without acute changes. MDM    CONSULTS     IP CONSULT TO HOSPITALIST    Critical Care:   None    REASSESSMENT          PROCEDURE     Unless otherwise noted below, none     Procedures      FINAL IMPRESSION      1. Altered mental status, unspecified altered mental status type    2. Acute cystitis with hematuria    3. Elevated troponin    4. Hyperglycemia            DISPOSITION/PLAN   DISPOSITION Decision To Admit 06/26/2021 11:15:31 PM        PATIENT REFERRED TO:  No follow-up provider specified.     DISCHARGE MEDICATIONS:  New Prescriptions    No medications on file     Controlled Substances Monitoring:     RX Monitoring 8/4/2017   Attestation The Prescription Monitoring Report for this patient was reviewed today. Periodic Controlled Substance Monitoring No signs of potential drug abuse or diversion identified.        (Please note that portions of this note were completed with a voice recognition program.  Efforts were made to edit the dictations but occasionally words are mis-transcribed.)    Wali Roberts MD (electronically signed)  Attending Emergency Physician            Aylin Garza MD  06/26/21 [id number]

## 2021-06-27 NOTE — CONSULTS
[facility]  Consult Note           Reason for Consult:  End stage renal disease  Requesting Physician:  Dr. Deja Gruber    Chief Complaint:  Altered mental status, hyperglycemia  History Obtained From:  patient, electronic medical record    History of Present Ilness:    46year old male with ESRD on HD Mon-Wed-Fri admitted with altered mental status, hyperglycemia. We have been asked to assist in further dialysis care. Last had HD this past Wednesday per family which was a shortened treatment. Has been having n/v, decreased intake, & altered mental status per family this week. Noted to have blood sugar in the 600's on admission with negative serum ketones. +short of breath per family, no chest pain, abdominal pain, or reported fevers.     Past Medical History:        Diagnosis Date    Ambulatory dysfunction     walker for long distances, SOB with distance    Aortic stenosis     echo 2017    Arthritis     hands and hips    Asthma     Bilateral hilar adenopathy syndrome 6/3/2013    CAD (coronary artery disease)     Dr. Moses Mckin[facility]) 04/19/2019    EF= 43%    [facility]est[facility] failure) ([facility])     Chronic pain     COPD (chronic obstructive pulmonary disease) ([address]     pulmonology Dr. Loyola Listen    Depression     Diabetes mellitus ([address]     borderline    Difficult intravenous access     Emphysema of lung ([address]     ESRD (end stage renal disease) on dialysis ([address]     MWF    Fear of needles     Gastric ulcer     GERD (gastroesophageal reflux disease)     Heart valve problem     bicuspic valve    Hemodialysis patient ([address]     History of spinal fracture     work incident    Hx of blood clots     Bilateral lower extremities; stents in place    Hyperlipidemia     Hypertension     MI (myocardial infarction) ([address] 2019    has had 9 MIs. 2019 was the last    Neuromuscular disorder ([address]     due to CVA    Numbness and tingling in left arm     from fistula    Pneumonia     PONV (postoperative nausea and vomiting)     Prolonged emergence from general anesthesia     States requires more medication than most people    Sleep apnea     Uses CPAP    Stroke ([facility]     7mm thalamic cva 2017 deficts left side, left side weakness    TIA (transient ischemic attack)     Unspecified diseases of blood and blood-forming organs        Past Surgical History:        Procedure Laterality Date    AORTIC VALVE REPLACEMENT N/A 10/15/2019    TRANSCATHETER AORTIC VALVE REPLACEMENT FEMORAL APPROACH performed by Faye Lazaro MD at [address] Right 7/2/2019    PERITONEAL DIALYSIS CATHETER REMOVAL performed by Alexey Hernandez MD at [facility] COLONOSCOPY  2/29/2015    WN    CORONARY ANGIOPLASTY WITH STENT PLACEMENT  05/26/15    CYST REMOVAL  08/14/2013    EXCISION CYSTS, NECK X2 AND ABDOMINAL benign    DIAGNOSTIC CARDIAC CATH LAB PROCEDURE      DIALYSIS FISTULA CREATION Left 10/30/2017    LEFT BRACHIAL CEPHALIC FISTULA    DIALYSIS FISTULA CREATION Left 3/27/2019    LIGATION  AV FISTULA performed by Juan Francisco Lopes MD at [facility]. [address], COLON, DIAGNOSTIC      OTHER SURGICAL HISTORY  02/01/2017    laparoscopic cholecystectomy with intraoperative cholangiogram    OTHER SURGICAL HISTORY  2018    PORT PLACEMENT  - vas cath    OTHER SURGICAL HISTORY Bilateral 06/26/2018    laprascopic peritoneal dialysis catheter placement    OTHER SURGICAL HISTORY Right 09/2018    peritoneal dialysis port placed on right side of abdomen    OTHER SURGICAL HISTORY  05/28/2019    PTA/Stenting R External Iliac artery    [place] LAP INSERTION TUNNELED INTRAPERITONEAL CATHETER N/A 9/21/2018    LAPAROSCOPIC PERITONEAL DIALYSIS CATHETER REPLACEMENT performed by Alexey Hernandez MD at [address]  01/06/2016    UPPER GASTROINTESTINAL ENDOSCOPY  01/29/2017    possible candida, otherwise normal appearing    VASCULAR SURGERY  aprx 2 years ago    2 stents placed, each side of groin       Home Medications:    No current facility-administered medications on file prior to encounter. Current Outpatient Medications on File Prior to Encounter   Medication Sig Dispense Refill    oxyCODONE-acetaminophen (PERCOCET) 7.5-325 MG per tablet Take 1 tablet by mouth every 4-6 hours as needed for Pain for up to 30 days. 150 tablet 0    hydrocortisone (ANUSOL-HC) 2.5 % CREA rectal cream Place rectally 2 times daily 30 g 0    docusate sodium (COLACE) 100 MG capsule Take 1 capsule by mouth 2 times daily 60 capsule 0    albuterol (PROVENTIL) (2.5 MG/3ML) 0.083% nebulizer solution INHALE 1 VIAL VIA NEBULIZER EVERY 6 HOURS AS NEEDED FOR WHEEZING 300 mL 5    nystatin (MYCOSTATIN) 392661 UNIT/GM cream Apply topically 2 times daily. 60 g 3    pregabalin (LYRICA) 150 MG capsule Take 1 capsule by mouth 3 times daily for 30 days. 60 capsule 5    famotidine (PEPCID) 20 MG tablet TAKE 1 TABLET BY MOUTH TWICE DAILY AS NEEDED FOR HEARTBURN 180 tablet 0    hydrOXYzine (VISTARIL) 50 MG capsule TAKE 1 TO 2 CAPSULES BY MOUTH NIGHTLY 60 capsule 5    gabapentin (NEURONTIN) 100 MG capsule TAKE 1 TO 2 CAPSULES BY MOUTH THREE TIMES A  capsule 5    LINZESS 145 MCG capsule TAKE 1 CAPSULE BY MOUTH EVERY MORNING BEFORE BREAKFAST 30 capsule 10    hydrALAZINE (APRESOLINE) 50 MG tablet TAKE 1/2 TABLET BY MOUTH EVERY 8 HOURS 90 tablet 2    traZODone (DESYREL) 150 MG tablet TAKE (1) TABLET BY MOUTH NIGHTLY 30 tablet 10    predniSONE (DELTASONE) 20 MG tablet Take 3 tablets daily for 3 days, 2 tablets daily for 3 days and 1 tablet daily for 3 days.  18 tablet 0    dilTIAZem (CARDIZEM CD) 180 MG extended release capsule       cyclobenzaprine (FLEXERIL) 10 MG tablet TAKE 1 TABLET BY MOUTH EVERY 8 HOURS AS NEEDED 90 tablet 5    DULoxetine (CYMBALTA) 30 MG extended release capsule TAKE 1 CAPSULE BY MOUTH EVERY DAY 30 capsule 10  tiZANidine (ZANAFLEX) 4 MG tablet TAKE 1 TABLET BY MOUTH THREE TIMES DAILY 90 tablet 10    pravastatin (PRAVACHOL) 40 MG tablet Take 1 tablet by mouth nightly 30 tablet 3    QUEtiapine (SEROQUEL) 50 MG tablet Take 1 tablet by mouth every evening 30 tablet 5    losartan (COZAAR) 50 MG tablet TAKE 1 TABLET BY MOUTH DAILY 30 tablet 10    pantoprazole (PROTONIX) 40 MG tablet TAKE (1) TABLET BY MOUTH EACH MORNING BEFORE BREAKFAST 90 tablet 1    simethicone (MYLICON) 80 MG chewable tablet Take 1 tablet by mouth every 6 hours as needed (cramping) 15 tablet 0    glucose monitoring kit (FREESTYLE) monitoring kit 1 kit by Does not apply route daily 1 kit 0    blood glucose test strips (GLUCOSE METER TEST) strip 1 each by In Vitro route 5 times daily As needed. 100 each 3    nitroGLYCERIN (NITROSTAT) 0.4 MG SL tablet DISSOLVE 1 TABLET UNDER THE TONGUE AS NEEDED FOR CHEST PAIN EVERY 5 MINUTES UP TO 3 TIMES. IF NO RELIEF CALL 911. 25 tablet 10    B Complex-C-Folic Acid (VIRT-CAPS) 1 MG CAPS TK ONE C PO  QD 90 capsule 1    insulin glargine (BASAGLAR KWIKPEN) 100 UNIT/ML injection pen Inject 70 Units into the skin nightly 15 mL 5    citalopram (CELEXA) 40 MG tablet TAKE (1) TABLET BY MOUTH DAILY 30 tablet 10    insulin aspart (NOVOLOG FLEXPEN) 100 UNIT/ML injection pen Inject 20 Units into the skin 3 times daily (before meals) 15 pen 5    isosorbide mononitrate (IMDUR) 30 MG extended release tablet TAKE 1 TABLET BY MOUTH EVERY DAY 90 tablet 10    ondansetron (ZOFRAN ODT) 4 MG disintegrating tablet Take 1 tablet by mouth every 8 hours as needed for Nausea 60 tablet 0    Calcium Acetate, Phos Binder, 667 MG CAPS TAKE 1 CAPSULE BY MOUTH THREE TIMES DAILY WITH MEALS 90 capsule 3    blood glucose test strips (FREESTYLE LITE) strip Daily As needed.  100 strip 3    glucose monitoring kit (FREESTYLE) monitoring kit 1 kit by Does not apply route daily 1 kit 0    vitamin D (ERGOCALCIFEROL) 08815 units CAPS capsule TK 1 C PO WEEKLY  11    flunisolide (NASALIDE) 25 MCG/ACT (0.025%) SOLN Inhale 2 sprays into the lungs every 12 hours 1 Bottle 5    Tiotropium Bromide-Olodaterol (STIOLTO RESPIMAT) 2.5-2.5 MCG/ACT AERS Inhale 2 puffs into the lungs daily 2 Inhaler 0    Polyethylene Glycol 3350 GRAN       Glucose Blood (BLOOD GLUCOSE TEST STRIPS) STRP TEST 3-4 TIMES DAILY, AS DIRECTED 100 strip 3    Blood Glucose Monitoring Suppl ADAM USE AS DIRECTED. 1 Device 0    Alcohol Swabs PADS USE AS DIRECTED 300 each 3    albuterol sulfate  (90 Base) MCG/ACT inhaler Inhale 2 puffs into the lungs every 6 hours as needed for Wheezing 1 Inhaler 3    ipratropium-albuterol (DUONEB) 0.5-2.5 (3) MG/3ML SOLN nebulizer solution Inhale 3 mLs into the lungs every 6 hours as needed for Shortness of Breath 360 mL 1    calcium carbonate (TUMS) 500 MG chewable tablet Take 1 tablet by mouth 3 times daily as needed for Heartburn.  aspirin 81 MG chewable tablet Take 1 tablet by mouth daily.  (Patient taking differently: Take 81 mg by mouth daily Indications: stopped on  for surgery ) 30 tablet 2       Allergies:  Morphine    Social History:    Social History     Socioeconomic History    Marital status:      Spouse name: Not on file    Number of children: Not on file    Years of education: Not on file    Highest education level: Not on file   Occupational History    Not on file   Tobacco Use    Smoking status: Current Every Day Smoker     Packs/day: 0.50     Years: 33.00     Pack years: 16.50     Types: Cigarettes     Last attempt to quit: 2020     Years since quittin.1    Smokeless tobacco: Never Used   Vaping Use    Vaping Use: Never used   Substance and Sexual Activity    Alcohol use: Not Currently     Alcohol/week: 0.0 standard drinks     Comment: occ    Drug use: No    Sexual activity: Yes     Partners: Female     Comment:    Other Topics Concern    Not on file   Social History Narrative    Not on file Social Determinants of Health     Financial Resource Strain:     Difficulty of Paying Living Expenses:    Food Insecurity:     Worried About Running Out of Food in the Last Year:     [address] in the Last Year:    Transportation Needs:     Lack of Transportation (Medical):  Lack of Transportation (Non-Medical):    Physical Activity:     Days of Exercise per Week:     Minutes of Exercise per Session:    Stress:     Feeling of Stress :    Social Connections:     Frequency of Communication with Friends and Family:     Frequency of Social Gatherings with Friends and Family:     Attends Voodoo Services:     Active Member of Clubs or Organizations:     Attends Club or Organization Meetings:     Marital Status:    Intimate Partner Violence:     Fear of Current or Ex-Partner:     Emotionally Abused:     Physically Abused:     Sexually Abused:        Family History:   Family History   Problem Relation Age of Onset    Diabetes Mother     Heart Disease Father     Kidney Disease Sister         stage 4-kidney failure    Cancer Sister     Heart Disease Sister     Obesity Sister     Cancer Sister     Heart Disease Sister     Obesity Sister     Alcohol Abuse Brother        Review of Systems:   Unable to obtain due to altered mental status.     Physical exam:   Constitutional:  VITALS:  BP (!) 176/94   Pulse 110   Temp 97.4 °F (36.3 °C) (Axillary)   Resp 26   Ht 5' 9\" (1.753 m)   Wt 285 lb 0.9 oz (129.3 kg)   SpO2 95%   BMI 42.10 kg/m²   Gen: poorly responsive, awake, ill-appearing  Skin: no rash, turgor wnl  Heent:  eomi, mmm  Neck: no bruits or jvd noted, thyroid normal  Cardiovascular:  S1, S2 without m/r/g  Respiratory: Bilateral rhonchi with gurgling noises without w/r; respiratory effort normal  Abdomen:  +bs, soft, nt, nd, no hepatosplenomegaly  Ext: + lower extremity edema  Access: Left IJ TDC  Psychiatric: mood and affect limited; judgement and insight limited  Musculoskeletal:  Rom, muscular strength limited; digits, nails normal    Data/  CBC:   Lab Results   Component Value Date    WBC 13.7 06/27/2021    RBC 3.80 06/27/2021    HGB 11.6 06/27/2021    HCT 33.3 06/27/2021    MCV 87.6 06/27/2021    MCH 30.4 06/27/2021    MCHC 34.8 06/27/2021    RDW 14.3 06/27/2021     06/27/2021    MPV 8.5 06/27/2021     BMP:    Lab Results   Component Value Date     06/27/2021    K 3.5 06/27/2021    K 3.5 06/26/2021    CL 98 06/27/2021    CO2 22 06/27/2021    BUN 43 06/27/2021    LABALBU 3.5 06/26/2021    CREATININE 6.3 06/27/2021    CALCIUM 8.8 06/27/2021    GFRAA 11 06/27/2021    GFRAA >60 05/17/2013    LABGLOM 9 06/27/2021    GLUCOSE 105 06/27/2021         Assessment/    - End stage renal disease - on HD Mon-Wed-Fri    - Acute metabolic encephalopathy - multi-factorial     - Hyperglycemia    - Hyponatremia - secondary to translocation from hyperglycemia, better with blood sugar correction    - Hypertension - poorly controlled    - Anemia of chronic disease - Hb 11.6      Plan/    - Acute HD to correct metabolic component to altered mental status   & remove any medications that may be contributing  - Repeat HD tomorrow per schedule  - Hold on DAVON dosing for now  - Trend labs, bp's, blood cultures, & mental status      Thank you for the consultation. Please do not hesitate to call with questions.     Mir Kitchen MD

## 2021-06-27 NOTE — PROGRESS NOTES
Nephrology consult received for ESRD. Follows with us at HD unit. Full consult note to follow.     Nancy Charles MD

## 2021-06-27 NOTE — ED NOTES
Patient is resting with his eyes closed quietly on his left side. Pt does have restlessness of legs and arms.   His wife reports Dowling Roch is like this all the time at night, he never lays still when he sleeps\"      Velia Carter RN  06/27/21 [id number]

## 2021-06-27 NOTE — PROGRESS NOTES
Treatment time: 3hrs  Net UF: 2500 ml     Pre weight: 120.1 kg   Post weight:117.6 kg  EDW: TBD     Access used: LTDC    Access function:good with  ml/min     Medications or blood products given: none     Regular outpatient schedule: [address] [place] [address]     Summary of response to treatment: Pt restless and agitated during entire tx, attempted to reorient multiple times, pt remained in 4pt restraints entire tx for safety measures. RN notified of status and medicated with PRN meds, PRN meds ineffective. No s/s of acute distress noted. Copy of dialysis treatment record placed in chart, to be scanned into EMR.

## 2021-06-27 NOTE — CARE COORDINATION
CASE MANAGEMENT INITIAL ASSESSMENT      Reviewed chart and completed assessment via telephone with: pt  Explained Case Management role/services. Primary contact information:  Health Care Decision Maker :   Primary Decision Maker: Crystal Ann - Spouse - [phone]      Can this person be reached and be able to respond quickly, such as within a few minutes or hours? Yes      Admit date/status: 6/27/21 IP  Diagnosis: hyperglycemia  Is this a Readmission?:  No      Insurance:Terrence Abdalla   Precert required for SNF: Yes       3 night stay required: No    Living arrangements, Adls, care needs, prior to admission: Pt lives with spouse in double wide trailer with ramp in. Pt uses scooter and performs ADLS. Drives himself to dialysis. Transportation: [address] at home:  Walker__Cane__RTS__ BSC__Shower Chair__  02__ HHN__ CPAP_x_  BiPap__  Hospital Bed__ W/C___ Other__rollator, scooter________    Services in the home and/or outpatient, prior to admission: [employer] qualified aide hrs 3x/week for 4 hrs but no aide available lately. Has dialysis transportation available through [employer] has used in past    Dialysis Facility (if applicable)   · Name: [place]  · Address:  · Dialysis Schedule: [facility]  · Phone:  · Fax:    PT/OT recs: none available    Hospital Exemption Notification (HEN): not initiated    Barriers to discharge: none    Plan/comments: Pt confused and restrained. Pt generally prefers to d/c home after hospitalization with [facility], but spouse states that she is unable to care for him unless he returns to baseline of independence. If rec'd she would likely request referral to [facility]. DCP following.      ECOC on chart for MD signature

## 2021-06-27 NOTE — ED NOTES
Patient attempting to climb out of bed.  Returned to bed with staff x2 and returned to monitor Admitting MD messaged for additional orders as continued restlessness and unable to redirect or reorientate to situation      Emma Chinchilla RN  06/27/21 [id number]

## 2021-06-27 NOTE — ED NOTES
Report received care assumed. Upon bedside report, noted to be restless and easily agitated when checking lines. Admitting MD in dept and informed new orders received. Medicated per order.       Gemma Diaz RN  06/27/21 [id number]

## 2021-06-27 NOTE — ED NOTES
Rounded on patient attempting to grab at IV pole, repositioned and redirected after five minutes returned to a resting state     Farhad De Luna, [facility]  06/27/21 7335

## 2021-06-27 NOTE — ED NOTES
Pt agitated when attempting CT. MD notified. 1mg Ativan ordered and given.       Rockwell Skiff, RN  06/26/21 [id number]

## 2021-06-27 NOTE — ED NOTES
Patient again with increased restlessness. Pulling at gown wires. IV secured with coban.  perma-cath secured with tegaderm MD informed new orders received      Danis Higgins RN  06/27/21 [id number]

## 2021-06-27 NOTE — PROGRESS NOTES
4 Eyes Skin Assessment     NAME:  Aracelis Grady OF BIRTH:  1970  MEDICAL RECORD NUMBER:  [id number]    The patient is being assess for  Admission    I agree that 2 RN's have performed a thorough Head to Toe Skin Assessment on the patient. ALL assessment sites listed below have been assessed. Areas assessed by both nurses:    Head, Face, Ears, Shoulders, Back, Chest, Arms, Elbows, Hands, Sacrum. Buttock, Coccyx, Ischium and Legs. Feet and Heels        Does the Patient have a Wound? Yes wound(s) were present on assessment.  LDA wound assessment was Initiated and completed        Isaac Prevention initiated:  No   Wound Care Orders initiated:  No    Pressure Injury (Stage 3,4, Unstageable, DTI, NWPT, and Complex wounds) if present place consult order under [de-identified] No    New and Established Ostomies if present place consult order under : No      Nurse 1 eSignature: Electronically signed by Alma Delia Ellison RN on 6/27/21 at 11:09 AM EDT        Nurse 2 eSignature: Electronically signed by Presley Marie RN on 6/27/21 at 12:32 PM EDT

## 2021-06-27 NOTE — PROGRESS NOTES
Hospitalist Progress Note      PCP: Kathy Schlatter, MD    Date of Admission: 6/26/2021    Chief Complaint: hyperglycemia, AMS    Hospital Course:   Bari Ji is a 46 y.o. male. He presents from home via ambulance. His wife activated EMS for hyperglycemia with altered mental status, namely he was agitated. He was so agitated, in fact, that he required sedation in the ER. By the time of my evaluation he had received lorazepam 5mg IV and very somnolent. Too somnolent to provide any history. Subjective: BG levels much improved. Patient remains very agitated and confused. No localizing symptoms noted.        Medications:  Reviewed    Infusion Medications    sodium chloride      dextrose       Scheduled Medications    aspirin  81 mg Oral Daily    Calcium Acetate (Phos Binder)  667 mg Oral TID WC    citalopram  40 mg Oral Daily    dilTIAZem  180 mg Oral Daily    hydrALAZINE  25 mg Oral TID    isosorbide mononitrate  30 mg Oral Daily    losartan  50 mg Oral Daily    pantoprazole  40 mg Oral QAM AC    pravastatin  40 mg Oral Nightly    QUEtiapine  50 mg Oral QPM    tiotropium-olodaterol  2 puff Inhalation Daily    heparin (porcine)  5,000 Units Subcutaneous 3 times per day    LORazepam        insulin lispro  0-18 Units Subcutaneous TID WC    insulin lispro  0-9 Units Subcutaneous Nightly    insulin glargine  70 Units Subcutaneous Nightly    insulin lispro  20 Units Subcutaneous TID WC     PRN Meds: haloperidol lactate, sodium chloride flush, sodium chloride, ondansetron **OR** ondansetron, acetaminophen **OR** acetaminophen, melatonin, calcium carbonate, hydrALAZINE, LORazepam, ziprasidone, glucose, dextrose, glucagon (rDNA), dextrose      Intake/Output Summary (Last 24 hours) at 6/27/2021 0941  Last data filed at 6/27/2021 2304  Gross per 24 hour   Intake 2050 ml   Output 875 ml   Net 1175 ml       Physical Exam Performed:    BP (!) 170/90   Pulse 99   Temp 97.9 °F (36.6 °C) (Axillary)   Resp 22   Ht 5' 9\" (1.753 m)   Wt 285 lb 0.9 oz (129.3 kg)   SpO2 94%   BMI 42.10 kg/m²     General appearance: No apparent distress, obese, confused  HEENT: Pupils equal, round, and reactive to light. Conjunctivae/corneas clear. Neck: Supple, with full range of motion. No jugular venous distention. Trachea midline. Respiratory:  Normal respiratory effort. Clear to auscultation, bilaterally without Rales/Wheezes/Rhonchi. Cardiovascular: Regular rate and rhythm with normal S1/S2 without murmurs, rubs or gallops. Abdomen: Soft, non-tender, non-distended with normal bowel sounds. Musculoskeletal: No clubbing, cyanosis or edema bilaterally. Full range of motion without deformity. Skin: Skin color, texture, turgor normal.  No rashes or lesions. Neurologic:  Neurovascularly intact without any focal sensory/motor deficits. Cranial nerves: II-XII intact, grossly non-focal.  Psychiatric: severely agitated, disoriented  Capillary Refill: Brisk,3 seconds, normal   Peripheral Pulses: +2 palpable, equal bilaterally       Labs:   Recent Labs     06/26/21 2134 06/27/21 0630   WBC 13.2* 13.7*   HGB 11.2* 11.6*   HCT 33.1* 33.3*    215     Recent Labs     06/26/21 2134 06/27/21 0630   * 135*   K 3.5 3.5   CL 86* 98*   CO2 25 22   BUN 45* 43*   CREATININE 6.4* 6.3*   CALCIUM 9.1 8.8   PHOS  --  3.0     Recent Labs     06/26/21 2134   AST 13*   ALT 13   BILITOT <0.2   ALKPHOS 119     No results for input(s): INR in the last 72 hours.   Recent Labs     06/26/21 2134   TROPONINI 0.21*       Urinalysis:      Lab Results   Component Value Date    NITRU Negative 06/26/2021    WBCUA 6-9 06/26/2021    BACTERIA Rare 06/26/2021    RBCUA 11-20 06/26/2021    BLOODU SMALL 06/26/2021    SPECGRAV 1.015 06/26/2021    GLUCOSEU >=1000 06/26/2021       Radiology:  CT Head WO Contrast   Final Result   Mild atrophy and mild chronic microischemic disease scattered in the deep   white matter which is unchanged with no acute abnormality seen. XR CHEST PORTABLE   Final Result   Clear lungs. Cardiomegaly. No acute cardiopulmonary abnormality. MRI BRAIN WO CONTRAST    (Results Pending)           Assessment/Plan:    Active Hospital Problems    Diagnosis     Dyslipidemia [E78.5]      Priority: High    CAD (coronary artery disease) [I25.10]      Priority: High    S/P TAVR (transcatheter aortic valve replacement) [Z95.2]      Priority: Medium    Essential hypertension [I10]      Priority: Medium    Type 2 diabetes mellitus with hyperglycemia (HCC) [E11.65]     Acute encephalopathy [G93.40]     ESRD (end stage renal disease) on dialysis ([facility] [N18.6, Z99.2]     ZAINAB on CPAP [G47.33, Z99.89]     Obesity (BMI 30-39. 9) [E66.9]      Acute encephalopathy  - unclear etiology  - Little improvement with correction of hyperglycemia  - CT head negative  - MRI brain ordered  - VBG unremarkable  - seroquel ordered  - currently in restraints  - supportive care    DMII  - poorly controlled  - continue basal/bolus insulin    ESRD  - Nephrology consulted    CAD  - no active chest pain  - continue ASA, statin, imdur    HTN  - poorly controlled  - continue home losartan, cardizem, hydralazine    HLD  - continue home statin    COPD  - no evidence of exacerbation  -     Anemia  - 2/2 ESRD  - at baseline  - continue to monitor    ZAINAB  - CPAP    Morbid Obesity  With Body mass index is 83.3 kg/m². Complicating assessment and treatment. Placing patient at risk for multiple co-morbidities as well as early death and contributing to the patient's presentation. Counseled on weight loss. DVT Prophylaxis: SQH  Diet: ADULT DIET; Regular; 4 carb choices (60 gm/meal); Low Fat/Low Chol/High Fiber/NIDA; Low Potassium (Less than 3000 mg/day); Low Phosphorus (Less than 1000 mg);  Less than 60 gm  Code Status: Full Code    PT/OT Eval Status: not ordered    Dispo - possibly 1-3 days pending mental status    Yelitza Hathaway MD

## 2021-06-27 NOTE — ED NOTES
Resting in bed calm respirations easy and unlabored no restlessness noted at this time.  Dr. Leslie Mancini in dept informed of recent blood glucose level      Enriqueta Terry RN  06/27/21 3201

## 2021-06-27 NOTE — ED NOTES
Attempted to obtain AM labs, unable to obtain. Labels sent to lab to obtain. Patient with intermittent restlessness. Will fall back asleep after a few moments Medicated per PRN order for BP.      Stefania Mancini RN  06/27/21 [id number]

## 2021-06-27 NOTE — H&P
Hospital Medicine History & Physical      Patient:  Jamie Morley  :   1970  MRN:   [id number]  Date of Service: 21    CHIEF COMPLAINT:  Hyperglycemia, altered mental status. HISTORY OF PRESENT ILLNESS:    Jamie Morley is a 46 y.o. male. He presents from home via ambulance. His wife activated EMS for hyperglycemia with altered mental status, namely he was agitated. He was so agitated, in fact, that he required sedation in the ER. By the time of my evaluation he had received lorazepam 5mg IV and very somnolent. Too somnolent to provide any history. Review of Systems:  Unobtainable due to mental status and sedation.     Past Medical History:   Diagnosis Date    Ambulatory dysfunction     walker for long distances, SOB with distance    Aortic stenosis     echo 2017    Arthritis     hands and hips    Asthma     Bilateral hilar adenopathy syndrome 6/3/2013    CAD (coronary artery disease)     Dr. Waldo Veloz [facility]) 2019    EF= 43%    CH[facility]congestive heart failure) ([facility])     Chronic pain     COPD (chronic obstructive pulmonary disease) ([address]     pulmonology Dr. Christian Givens    Depression     Diabetes mellitus ([address]     borderline    Difficult intravenous access     Emphysema of lung ([address]     ESRD (end stage renal disease) on dialysis ([address]     MWF    Fear of needles     Gastric ulcer     GERD (gastroesophageal reflux disease)     Heart valve problem     bicuspic valve    Hemodialysis patient ([address]     History of spinal fracture     work incident    Hx of blood clots     Bilateral lower extremities; stents in place    Hyperlipidemia     Hypertension     MI (myocardial infarction) ([address] 2019    has had 9 MIs. 2019 was the last    Neuromuscular disorder ([address]     due to CVA    Numbness and tingling in left arm     from fistula    Pneumonia     PONV (postoperative nausea and vomiting)     Prolonged emergence from general anesthesia     States requires more medication than most people    Sleep apnea     Uses CPAP    Stroke ([facility] [address]     7mm thalamic cva 2017 deficts left side, left side weakness    TIA (transient ischemic attack)     Unspecified diseases of blood and blood-forming organs        Past Surgical History:   Procedure Laterality Date    AORTIC VALVE REPLACEMENT N/A 10/15/2019    TRANSCATHETER AORTIC VALVE REPLACEMENT FEMORAL APPROACH performed by Mariaelena Dunne MD at [address] Right 7/2/2019    PERITONEAL DIALYSIS CATHETER REMOVAL performed by Adam Sharp MD at [facility] COLONOSCOPY  2/29/2015    WN    CORONARY ANGIOPLASTY WITH STENT PLACEMENT  05/26/15    CYST REMOVAL  08/14/2013    EXCISION CYSTS, NECK X2 AND ABDOMINAL benign    DIAGNOSTIC CARDIAC CATH LAB PROCEDURE      DIALYSIS FISTULA CREATION Left 10/30/2017    LEFT BRACHIAL CEPHALIC FISTULA    DIALYSIS FISTULA CREATION Left 3/27/2019    LIGATION  AV FISTULA performed by Marguerite Matt MD at [address], COLON, DIAGNOSTIC      OTHER SURGICAL HISTORY  02/01/2017    laparoscopic cholecystectomy with intraoperative cholangiogram    OTHER SURGICAL HISTORY  2018    PORT PLACEMENT  - vas cath    OTHER SURGICAL HISTORY Bilateral 06/26/2018    laprascopic peritoneal dialysis catheter placement    OTHER SURGICAL HISTORY Right 09/2018    peritoneal dialysis port placed on right side of abdomen    OTHER SURGICAL HISTORY  05/28/2019    PTA/Stenting R External Iliac artery    UT LAP INSERTION TUNNELED INTRAPERITONEAL CATHETER N/A 9/21/2018    LAPAROSCOPIC PERITONEAL DIALYSIS CATHETER REPLACEMENT performed by Adam Sharp MD at [facility]  01/06/2016    UPPER GASTROINTESTINAL ENDOSCOPY  01/29/2017    possible candida, otherwise normal appearing    VASCULAR SURGERY  aprx 2 years ago    2 stents placed, each side of groin         Prior extended release capsule TAKE 1 CAPSULE BY MOUTH EVERY DAY 4/27/21   Aixa Mijares MD   tiZANidine (ZANAFLEX) 4 MG tablet TAKE 1 TABLET BY MOUTH THREE TIMES DAILY 4/27/21   Aixa Mijares MD   pravastatin (PRAVACHOL) 40 MG tablet Take 1 tablet by mouth nightly 4/27/21   Rosanna Butler, APRN - CNP   QUEtiapine (SEROQUEL) 50 MG tablet Take 1 tablet by mouth every evening 3/25/21   Aixa Mijares MD   losartan (COZAAR) 50 MG tablet TAKE 1 TABLET BY MOUTH DAILY 3/25/21   Aixa Mijares MD   pantoprazole (PROTONIX) 40 MG tablet TAKE (1) TABLET BY MOUTH EACH MORNING BEFORE BREAKFAST 3/9/21   Aixa Mijares MD   simethicone (MYLICON) 80 MG chewable tablet Take 1 tablet by mouth every 6 hours as needed (cramping) 2/3/21   Jennifer Larson MD   glucose monitoring kit (FREESTYLE) monitoring kit 1 kit by Does not apply route daily 1/8/21   Aixa Mijares MD   blood glucose test strips (GLUCOSE METER TEST) strip 1 each by In Vitro route 5 times daily As needed. 1/8/21   Aixa Mijares MD   nitroGLYCERIN (NITROSTAT) 0.4 MG SL tablet DISSOLVE 1 TABLET UNDER THE TONGUE AS NEEDED FOR CHEST PAIN EVERY 5 MINUTES UP TO 3 TIMES.  IF NO RELIEF CALL 911. 1/7/21   Aixa Mijares MD   B Complex-C-Folic Acid (VIRT-CAPS) 1 MG CAPS TK ONE C PO  QD 11/18/20   Aixa Mijares MD   insulin glargine [facility]) 100 UNIT/ML injection pen Inject 70 Units into the skin nightly 11/10/20   Aixa Mijares MD   citalopram (CELEXA) 40 MG tablet TAKE (1) TABLET BY MOUTH DAILY 11/4/20   Aixa Mijares MD   insulin aspart (NOVOLOG FLEXPEN) 100 UNIT/ML injection pen Inject 20 Units into the skin 3 times daily (before meals) 10/12/20   Aixa Mijares MD   isosorbide mononitrate (IMDUR) 30 MG extended release tablet TAKE 1 TABLET BY MOUTH EVERY DAY 9/1/20   Aixa Mijares MD   ondansetron (ZOFRAN ODT) 4 MG disintegrating tablet Take 1 tablet by mouth every 8 hours as needed for Nausea 8/5/20   Aixa Mijares MD   Calcium Acetate, Phos Binder, 667 MG CAPS TAKE 1 CAPSULE BY MOUTH THREE TIMES DAILY WITH MEALS 6/29/20   Angelique Stone MD   blood glucose test strips (FREESTYLE LITE) strip Daily As needed. 8/19/19   Angelique Stone MD   glucose monitoring kit (FREESTYLE) monitoring kit 1 kit by Does not apply route daily 8/19/19   Angelique Stone MD   vitamin D (ERGOCALCIFEROL) 39329 units CAPS capsule TK 1 C PO WEEKLY 6/2/19   Historical Provider, MD   flunisolide (NASALIDE) 25 MCG/ACT (0.025%) SOLN Inhale 2 sprays into the lungs every 12 hours 5/22/19   Asher Sims MD   Tiotropium Bromide-Olodaterol (STIOLTO RESPIMAT) 2.5-2.5 MCG/ACT AERS Inhale 2 puffs into the lungs daily 5/21/19   Asher Sims MD   Polyethylene Glycol 3350 GRAN  5/2/18   Historical Provider, MD   Glucose Blood (BLOOD GLUCOSE TEST STRIPS) STRP TEST 3-4 TIMES DAILY, AS DIRECTED 4/25/18   Barak Figueroa MD   Blood Glucose Monitoring Suppl ADAM USE AS DIRECTED. 4/25/18   Barak Figueroa MD   Alcohol Swabs PADS USE AS DIRECTED 4/25/18   Barak Figueroa MD   albuterol sulfate  (90 Base) MCG/ACT inhaler Inhale 2 puffs into the lungs every 6 hours as needed for Wheezing 11/8/17   Kristi Corona MD   ipratropium-albuterol (DUONEB) 0.5-2.5 (3) MG/3ML SOLN nebulizer solution Inhale 3 mLs into the lungs every 6 hours as needed for Shortness of Breath 10/15/17   Melonie Hernandez MD   calcium carbonate (TUMS) 500 MG chewable tablet Take 1 tablet by mouth 3 times daily as needed for Heartburn. Historical Provider, MD   aspirin 81 MG chewable tablet Take 1 tablet by mouth daily. Patient taking differently: Take 81 mg by mouth daily Indications: stopped on 6/25 for surgery  5/14/13   Sindi Holt MD       Allergies:   Morphine    Social:   reports that he has been smoking cigarettes. He has a 16.50 pack-year smoking history. He has never used smokeless tobacco.   reports previous alcohol use.   Social History     Substance and Sexual Activity   Drug Use No       Family History Problem Relation Age of Onset    Diabetes Mother     Heart Disease Father     Kidney Disease Sister         stage 4-kidney failure    Cancer Sister     Heart Disease Sister     Obesity Sister     Cancer Sister     Heart Disease Sister     Obesity Sister     Alcohol Abuse Brother        PHYSICAL EXAM:  I performed this physical examination. Vitals:  Patient Vitals for the past 24 hrs:   BP Temp Temp src Pulse Resp SpO2 Height Weight   06/27/21 0630 (!) 177/94 -- -- 106 16 95 % -- --   06/27/21 0621 -- -- -- 103 13 95 % 5' 9\" (1.753 m) 285 lb 0.9 oz (129.3 kg)   06/27/21 0619 (!) 177/95 -- -- 108 24 97 % -- --   06/27/21 0615 -- -- -- 110 18 97 % -- --   06/27/21 0555 (!) 168/79 -- -- 117 21 98 % -- --   06/27/21 0530 (!) 183/99 -- -- 101 -- 95 % -- --   06/27/21 0500 (!) 185/70 -- -- 105 -- 94 % -- --   06/27/21 0440 (!) 184/89 -- -- 117 -- 96 % -- --   06/27/21 0400 (!) 165/92 -- -- 105 -- 92 % -- --   06/27/21 0310 (!) 176/105 -- -- 105 22 94 % -- --   06/27/21 0245 131/89 -- -- 105 26 94 % -- --   06/27/21 0230 101/79 -- -- 103 -- 95 % -- --   06/27/21 0200 (!) 170/73 -- -- -- -- 94 % -- --   06/27/21 0140 (!) 166/92 -- -- 110 12 100 % -- --   06/27/21 0105 (!) 174/85 -- -- 117 16 95 % -- --   06/27/21 0035 (!) 188/97 -- -- 115 24 100 % -- --   06/27/21 0020 -- -- -- 109 23 98 % -- --   06/27/21 0000 (!) 190/88 -- -- 106 20 95 % -- --   06/26/21 2328 (!) 186/89 -- -- 108 15 96 % -- --   06/26/21 2317 (!) 185/93 -- -- 110 26 94 % -- --   06/26/21 2229 (!) 198/79 -- -- 99 11 94 % -- --   06/26/21 2143 -- -- -- -- -- -- -- 270 lb (122.5 kg)   06/26/21 2136 -- 99.6 °F (37.6 °C) Oral -- -- -- -- --   06/26/21 2132 130/85 -- -- 108 20 100 % 5' 9\" (1.753 m) --       Intake/Output Summary (Last 24 hours) at 6/27/2021 0643  Last data filed at 6/27/2021 2033  Gross per 24 hour   Intake 2050 ml   Output 400 ml   Net 1650 ml       Room air    GEN:  Appearance:  Obese male in NAD .     Level of Consciousness: Deeply somnolent . Orientation:  N/A    HEENT: Sclera anicteric.  no conjunctival chemosis. moist mucus membranes. no specific or diagnostic oral lesions. Obviously periods of upper airway obstruction with apnea    NECK:  no signs of meningismus. Jugular veins not discernible. Carotid pulses  2+.  no cervical lymphadenopathy. no thyromegaly. CV:  regular rhythm. normal S1 & S2.    no murmur. no rub.  no gallop. CHEST: Left upper chest tunneled HD catheter    PULM:  Chest excursion is symmetric. Breath sounds are vesicular. Adventitious sounds:  none    AB:  Abdominal shape is obese. Bowel sounds are active. Generally soft to palpation. no tenderness is present. no involuntary guarding. no rebound guarding. EXTR:  Skin is warm. Capillary refill brisk. no specific or pathognomic rash. no clubbing. trace pitting edema. no active wound or ulcer. LABS:  Lab Results   Component Value Date    WBC 13.2 (H) 06/26/2021    HGB 11.2 (L) 06/26/2021    HCT 33.1 (L) 06/26/2021    MCV 89.2 06/26/2021     06/26/2021     Lab Results   Component Value Date    CREATININE 6.4 (HH) 06/26/2021    BUN 45 (H) 06/26/2021     (L) 06/26/2021    K 3.5 06/26/2021    CL 86 (L) 06/26/2021    CO2 25 06/26/2021     Lab Results   Component Value Date    ALT 13 06/26/2021    AST 13 (L) 06/26/2021    ALKPHOS 119 06/26/2021    BILITOT <0.2 06/26/2021     Lab Results   Component Value Date    CKTOTAL 167 06/05/2020    TROPONINI 0.21 (H) 06/26/2021     No results for input(s): PHART, [id number], PO2ART in the last 72 hours.     IMAGING:  CT Head WO Contrast    Result Date: 6/26/2021  EXAMINATION: CT OF THE HEAD WITHOUT CONTRAST  6/26/2021 10:24 pm TECHNIQUE: CT of the head was performed without the administration of intravenous contrast. Dose modulation, iterative reconstruction, and/or weight based adjustment of the mA/kV was utilized to reduce the radiation dose to as low as reasonably all recent imaging studies as well as pertinent prior studies. Radiology reports may or may not be available at the time of my review. EKG:  New and pertinent prior tracings were directly reviewed. My interpretation is as follows:  Normal sinus rhythm with PAC's. LAFB. Active Hospital Problems    Diagnosis Date Noted    Dyslipidemia [E78.5]      Priority: High    CAD (coronary artery disease) [I25.10] 05/14/2013     Priority: High    S/P TAVR (transcatheter aortic valve replacement) [Z95.2] 10/19/2019     Priority: Medium    Essential hypertension [I10] 11/14/2013     Priority: Medium    Type 2 diabetes mellitus with hyperglycemia ([facility] [address] [E11.65] 06/27/2021    Acute encephalopathy [G93.40] 06/27/2021    ESRD (end stage renal disease) on dialysis ([facility] [address] [N18.6, Z99.2] 11/22/2017    ZAINAB on CPAP [G47.33, Z99.89] 02/11/2016    Obesity (BMI 30-39. 9) [E66.9]        ASSESSMENT & PLAN  DM2 w/ hyperglycemia  -  Because patient is not acidemic and minimally ketotic will attempt treatment w/ s.c. insulin. If this fails will start insulin infusion and transfer to ICU, though no ICU beds are availble. -  UPDATE:  Glycemic control was attained quickly. Essentially he was just given 18U s.c. lispro and his home dose of glargine insulin 70 units. This suggests his hyperglycemia was due to  noncompliance with his home regimen. -  OK to resume renal diet once awake. Acute encephalopathy  -  Psychomotor agitation requiring sedation with lorazepam, then haldol, then geodon.  -  Resume seroquel tomorrow night. ESRD, HTN, anemia of CKD  -  HD qMWF. Request nephrology assistance if patient is still hospitalized on Monday. -  Continue PhosLo. -  Continue home diltiazem, hydralazine, losartan. CAD s/p PCI,  AS s/p TAVR  - Stable. Continue aspirin, imdur and statin. Severe Asthma-COPD, ZAINAB on CPAP  - Continue home LABA/LAAC inhaler.  -  Nocturnal CPAP.   Latest CPAP titration I can find is from 2015 with settings 8 - 12 cm H2O.  10cm H2O prescribed here. DVT prophylaxis: SCDs, s.c. UFH  Code Status:  Full  Disposition:  Inpatient. Anticipate d/c to home in 1-2 days assuming mental status normalizes.     Juliocesar Haley MD MD

## 2021-06-27 NOTE — ED NOTES
Patient's wife called for an update states this confused state is new. Aware patient will be admitted.  States will come out in the am      Mitra Mao RN  06/27/21 [place] [id number]  06/27/21 [id number]

## 2021-06-27 NOTE — ED NOTES
Re medicated per order. continues to pull of monitor pull/grab at staff when trying to be redirected.  After approx 5-10 mins of restlessness will fall back asleep and then begin again within 10 mins      Tonia Bose RN  06/27/21 2376

## 2021-06-27 NOTE — DISCHARGE INSTR - COC
Continuity of Care Form    Patient Name: Ryley Turner   :  1970  MRN:  [id number]    Admit date:  2021  Discharge date:  21     Code Status Order: Full Code   Advance Directives:      Admitting Physician:  Deborah Morales MD  PCP: Isamar Montiel MD    Discharging Nurse: [facility] Unit/Room#: 0444/6369-02  Discharging Unit Phone Number: [phone]    Emergency Contact:   Extended Emergency Contact Information  Primary Emergency Contact: Crystal Ann  Address: [address] [facility], [address] [facility] Alpesh Breanna of [address]  Phone: [phone]  Mobile Phone: [phone]  Relation: Spouse    Past Surgical History:  Past Surgical History:   Procedure Laterality Date    AORTIC VALVE REPLACEMENT N/A 10/15/2019    TRANSCATHETER AORTIC VALVE REPLACEMENT FEMORAL APPROACH performed by Muna Gao MD at [address] [facility] Right 2019    PERITONEAL DIALYSIS CATHETER REMOVAL performed by Raissa Mckeon MD at [address] [place] [address]      WNL    CORONARY ANGIOPLASTY WITH STENT PLACEMENT  05/26/15    CYST REMOVAL  2013    EXCISION CYSTS, NECK X2 AND ABDOMINAL benign    DIAGNOSTIC CARDIAC CATH LAB PROCEDURE      DIALYSIS FISTULA CREATION Left 10/30/2017    LEFT BRACHIAL CEPHALIC FISTULA    DIALYSIS FISTULA CREATION Left 3/27/2019    LIGATION  AV FISTULA performed by Iraj Yang MD at [address] [place] [address], DIAGNOSTIC      OTHER SURGICAL HISTORY  2017    laparoscopic cholecystectomy with intraoperative cholangiogram    OTHER SURGICAL HISTORY  2018    PORT PLACEMENT  - vas cath    OTHER SURGICAL HISTORY Bilateral 2018    laprascopic peritoneal dialysis catheter placement    OTHER SURGICAL HISTORY Right 2018    peritoneal dialysis port placed on right side of abdomen    OTHER SURGICAL HISTORY  2019    PTA/Stenting R External Iliac artery    GA LAP INSERTION TUNNELED INTRAPERITONEAL CATHETER N/A 9/21/2018    LAPAROSCOPIC PERITONEAL DIALYSIS CATHETER REPLACEMENT performed by Adam Sharp MD at [address] [facility] ENDOSCOPY  01/06/2016    UPPER GASTROINTESTINAL ENDOSCOPY  01/29/2017    possible candida, otherwise normal appearing    VASCULAR SURGERY  aprx 2 years ago    2 stents placed, each side of groin       Immunization History:   Immunization History   Administered Date(s) Administered    Hepatitis B Adult (Engerix-B) 11/18/2017, 06/13/2018, 07/13/2018    Influenza Virus Vaccine 12/27/2012, 10/07/2014, 11/20/2015, 10/16/2019    Influenza, Intradermal, Quadrivalent, Preservative Free 11/16/2016    Influenza, MDCK Quadv, IM, PF (Flucelvax 4 yrs and older) 10/14/2017, 09/30/2020    Influenza, Loulou Broody, 6 mo and older, IM, PF (Flulaval, Fluarix) 09/15/2018    Influenza, Quadv, IM, PF (6 mo and older Fluzone, Flulaval, Fluarix, and 3 yrs and older Afluria) 10/16/2019    PPD Test 11/09/2017, 11/16/2017, 01/03/2019, 01/06/2020, 01/15/2021    Pneumococcal Conjugate 13-valent ([id number]) 10/14/2017    Pneumococcal Polysaccharide ([id number]) 10/07/2014, 11/19/2019    Tdap (Boostrix, Adacel) 02/13/2020    Zoster Recombinant (Shingrix) 02/13/2020       Active Problems:  Patient Active Problem List   Diagnosis Code    Acute respiratory failure with hypoxia and hypercapnia ([facility]) J96.01, J96.02    Chronic dCHF (grade 2 LVDD) I50.9    Chronic obstructive pulmonary disease ([facility]) J44.9    CAD (coronary artery disease) I25.10    PVD (peripheral vascular disease) ([facility]) I73.9    Nonischemic cardiomyopathy ([facility] I42.8    Sleep apnea G47.30    Bicuspid aortic valve Q23.1    Bilateral hilar adenopathy syndrome R59.0    Claudication in peripheral vascular disease ([facility]) I73.9    Essential hypertension I10    Diabetic neuropathy ([facility]) E11.40    Type 2 diabetes, uncontrolled, with neuropathy ([facility]) E11.40, E11.65    Passive smoke exposure Z77.22    Depression with anxiety F41.8    Hematoma T14. 8XXA    Pneumonia of right upper lobe due to infectious organism J18.9    DM (diabetes mellitus), secondary, uncontrolled, w/neurologic complic ([facility] Y02.48, P47.16    Hypertensive heart disease with congestive heart failure with preserved left ventricular function (HCC) I11.0, I50.30    CHF exacerbation ([facility]) I50.9    Chest pain R07.9    Coronary artery disease involving native coronary artery of native heart without angina pectoris I25.10    Obesity (BMI 30-39. 9) E66.9    ZAINAB on CPAP G47.33, Z99.89    Degeneration of lumbar or lumbosacral intervertebral disc M51.37    Lumbar radiculopathy M54.16    Lumbosacral spondylosis without myelopathy J85.163    Biliary colic W52.84    Symptomatic cholelithiasis K80.20    Gastroparesis due to DM ([facility]) E11.43, K31.84    Angina, class IV ([facility]) I20.9    Dyspnea R06.00    Elevated brain natriuretic peptide (BNP) level R79.89    Dyslipidemia E78.5    Respiratory distress R06.03    Hypoxia R09.02    Chest pressure R07.89    Hypertensive urgency I16.0    Acute on chronic combined systolic and diastolic heart failure ([facility]) I50.43    Ischemic cardiomyopathy I25.5    Chronic renal failure, stage 5 ([facility]) N18.5    Tobacco abuse Z72.0    CKD stage 4 due to type 2 diabetes mellitus ([facility]) E11.22, N18.4    CVA (cerebral vascular accident) ([facility] I63.9    Arterial ischemic stroke, ICA, right, acute ([facility]) I63.231    Type 2 diabetes mellitus without complication, without long-term current use of insulin ([facility]) E11.9    HTN (hypertension), benign I10    ZAINAB (obstructive sleep apnea) G47.33    Diarrhea R19.7    Pleural effusion J90    Chronic anemia D64.9    Nonrheumatic aortic valve stenosis I35.0    Hypervolemia E87.70    Hyperkalemia E87.5    Mucus plugging of bronchi T17.500A    Hemodialysis-associated hypotension I95.3    Left arm pain M79.602    Dizziness R42  ESRD (end stage renal disease) on dialysis ([facility]) N18.6, Z99.2    Hypotension due to drugs I95.2    Acute diastolic CHF (congestive heart failure) ([facility]) I50.31    Neuromuscular disorder ([facility]) G70.9    Acute combined systolic and diastolic CHF, NYHA class 4 ([facility]) I50.41    Renovascular hypertension I15.0    Mixed hyperlipidemia E78.2    Cigarette nicotine dependence in remission F17.211    Acute respiratory failure ([facility]) J96.00    Pulmonary edema J81.1    Fluid overload V72.48    Diastolic dysfunction G46.31    Anemia of chronic disease D63.8    SOB (shortness of breath) R06.02    Steal syndrome of dialysis vascular access ([facility]) T82.898A    Chronic, continuous use of opioids F11.90    Chronic bronchitis ([facility]) J42    Nasal congestion R09.81    Hypercholesteremia E78.00    Bradycardia R00.1    S/P TAVR (transcatheter aortic valve replacement) Z95.2    Syncope and collapse R55    Atrial fibrillation ([facility]) I48.91    Former smoker Z87.891    Generalized weakness R53.1    DKA, type 1, not at goal [facility]) E10.10    Bilateral leg weakness R29.898    GBS (Guillain-Pingree syndrome) ([facility]) G61.0    Sinus pause I45.5    Weakness of both lower extremities R29.898    Sinus bradycardia R00.1    Ataxia R27.0    Peripheral vascular occlusive disease ([facility]) I73.9    Cellulitis of right foot L03.115    Iliac artery occlusion, right ([facility]) I74.5    Cellulitis and abscess of hand L03.119, L02.519    Type 2 diabetes mellitus with hyperglycemia ([facility]) E11.65    Acute encephalopathy G93.40       Isolation/Infection:   Isolation            No Isolation          Patient Infection Status       Infection Onset Added Last Indicated Last Indicated By Review Planned Expiration Resolved Resolved By    None active    Resolved    COVID-19 Rule Out 12/18/20 12/18/20 12/18/20 COVID-19 (Ordered)   12/18/20 Rule-Out Test Resulted    COVID-19 Rule Out 05/04/20 05/04/20 05/04/20 COVID-19 (Ordered)   05/04/20 Rule-Out Test Resulted            Nurse Assessment:  Last Vital Signs: BP (!) 170/90   Pulse 99   Temp 97.9 °F (36.6 °C) (Axillary)   Resp 22   Ht 5' 9\" (1.753 m)   Wt 285 lb 0.9 oz (129.3 kg)   SpO2 94%   BMI 42.10 kg/m²     Last documented pain score (0-10 scale): Pain Level: 10  Last Weight:   Wt Readings from Last 1 Encounters:   06/27/21 285 lb 0.9 oz (129.3 kg)     Mental Status:  oriented, alert, coherent, logical, thought processes intact and able to concentrate and follow conversation    IV Access:  - Dialysis Catheter  - site  left, insertion date: -    Nursing Mobility/ADLs:  Walking   Assisted  Transfer  Assisted  Bathing  Independent  Dressing  Independent  Toileting  Independent  Feeding  Independent  Med Admin  Assisted  Med Delivery   whole    Wound Care Documentation and Therapy:  Wound 12/31/20 Foot Left (Active)   Number of days: 177       Wound 12/31/20 Foot Right (Active)   Number of days: 177        Elimination:  Continence:   · Bowel: Yes  · Bladder: Yes  Urinary Catheter: Removal Date 6/28/21   Colostomy/Ileostomy/Ileal Conduit: No       Date of Last BM: 6/28/21    Intake/Output Summary (Last 24 hours) at 6/27/2021 1013  Last data filed at 6/27/2021 0927  Gross per 24 hour   Intake 2050 ml   Output 875 ml   Net 1175 ml     I/O last 3 completed shifts: In: 2050 [IV Piggyback:2050]  Out: 400 [Urine:400]    Safety Concerns: At Risk for Falls    Impairments/Disabilities:      None    Nutrition Therapy:  Current Nutrition Therapy:   - Oral Diet:  Carb Control 4 carbs/meal (1800kcals/day)    Routes of Feeding: Oral  Liquids: No Restrictions  Daily Fluid Restriction: no  Last Modified Barium Swallow with Video (Video Swallowing Test): not done    Treatments at the Time of Hospital Discharge:   Respiratory Treatments: Stiolto  Oxygen Therapy:  is not on home oxygen therapy.   Ventilator:    - No ventilator support    Rehab Therapies: SN/PT/OT  Weight Bearing Status/Restrictions: No weight bearing restirctions  Other Medical Equipment (for information only, NOT a DME order):  None  Other Treatments: None    Patient's personal belongings (please select all that are sent with patient):  None    RN SIGNATURE:  Electronically signed by Slime Mitchell on 6/29/21 at 11:40 AM EDT    CASE MANAGEMENT/SOCIAL WORK SECTION    Inpatient Status Date: 6/27/21    Readmission Risk Assessment Score:  Readmission Risk              Risk of Unplanned Readmission:  67           Discharging to Facility/ Agency   · Name: [address] [place] [address]  · Address:  · Phone: [phone]  · Fax: [phone]    / signature: Electronically signed by Guillermo Velázquez RN on 6/29/21 at 11:11 AM EDT    PHYSICIAN SECTION    Prognosis: Good    Condition at Discharge: Stable    Rehab Potential (if transferring to Rehab): Good    Recommended Labs or Other Treatments After Discharge: PT/OT, skilled nursing    Physician Certification: I certify the above information and transfer of Norvel Sheets  is necessary for the continuing treatment of the diagnosis listed and that he requires Home Care for less 30 days.      Update Admission H&P: No change in H&P    PHYSICIAN SIGNATURE:  Electronically signed by Edgar Del Castillo MD on 6/29/21 at 9:26 AM EDT

## 2021-06-27 NOTE — ED NOTES
Pt agitated and is attempting to remove PIV, solis catheter, and cardiac monitor leads. Pt attempting to get out of bed. RNs and tech to bedside to re orient pt for pt safety. Ativan 2 mg ordered PIV for pt, when giving medication pt PIV infiltrated. Additional 2 mg Ativan given via other PIV. Total of 5 mg Ativan given to pt at this time.  Pt remains agitated and fighting staff and attempting to get out of bed     Gilbert Haji RN  06/26/21 [id number]

## 2021-06-27 NOTE — PROGRESS NOTES
Pt agitated yelling into halls pulling at bedrails shaking bed rails even while restrained. Restless. Medicated per [facility] for agitation.

## 2021-06-27 NOTE — PLAN OF CARE
Problem: Falls - Risk of:  Goal: Will remain free from falls  Description: Will remain free from falls  Outcome: Ongoing     Problem: Non-Violent Restraints  Goal: Removal from restraints as soon as assessed to be safe  Outcome: Ongoing  Goal: No harm/injury to patient while restraints in use  Outcome: Ongoing  Goal: Patient's dignity will be maintained  Outcome: Ongoing

## 2021-06-27 NOTE — ED NOTES
Bed: 15  Expected date:   Expected time:   Means of arrival:   Comments:  15 Jake Solorio RN  06/26/21 [id number]

## 2021-06-27 NOTE — ED NOTES
Patient identified as a positive fall risk on the ED triage fall screening. Patient placed in fall precautions which includes:  yellow fall risk bracelet on wrist, yellow socks on feet, \"Be Safe\" sign placed on patient's door, and bed alarm placed under patient/alarm turned on. Patient instructed on importance of not getting out of bed or ambulating without assistance for safety.         Ney Fonseca RN  06/27/21 1294

## 2021-06-28 LAB
ANION GAP SERPL CALCULATED.3IONS-SCNC: 14 MMOL/L (ref 3–16)
BASOPHILS ABSOLUTE: 0.1 K/UL (ref 0–0.2)
BASOPHILS RELATIVE PERCENT: 0.5 %
BUN BLDV-MCNC: 29 MG/DL (ref 7–20)
CALCIUM SERPL-MCNC: 8.7 MG/DL (ref 8.3–10.6)
CHLORIDE BLD-SCNC: 99 MMOL/L (ref 99–110)
CO2: 23 MMOL/L (ref 21–32)
CREAT SERPL-MCNC: 5 MG/DL (ref 0.9–1.3)
EOSINOPHILS ABSOLUTE: 0.3 K/UL (ref 0–0.6)
EOSINOPHILS RELATIVE PERCENT: 2.5 %
ESTIMATED AVERAGE GLUCOSE: 303.4 MG/DL
GFR AFRICAN AMERICAN: 15
GFR NON-AFRICAN AMERICAN: 12
GLUCOSE BLD-MCNC: 122 MG/DL (ref 70–99)
GLUCOSE BLD-MCNC: 123 MG/DL (ref 70–99)
GLUCOSE BLD-MCNC: 176 MG/DL (ref 70–99)
GLUCOSE BLD-MCNC: 284 MG/DL (ref 70–99)
GLUCOSE BLD-MCNC: 330 MG/DL (ref 70–99)
GLUCOSE BLD-MCNC: 90 MG/DL (ref 70–99)
HBA1C MFR BLD: 12.2 %
HCT VFR BLD CALC: 33.6 % (ref 40.5–52.5)
HEMOGLOBIN: 11.6 G/DL (ref 13.5–17.5)
LYMPHOCYTES ABSOLUTE: 1.4 K/UL (ref 1–5.1)
LYMPHOCYTES RELATIVE PERCENT: 11.8 %
MAGNESIUM: 2.6 MG/DL (ref 1.8–2.4)
MCH RBC QN AUTO: 30.4 PG (ref 26–34)
MCHC RBC AUTO-ENTMCNC: 34.5 G/DL (ref 31–36)
MCV RBC AUTO: 88 FL (ref 80–100)
MONOCYTES ABSOLUTE: 1.1 K/UL (ref 0–1.3)
MONOCYTES RELATIVE PERCENT: 9.1 %
NEUTROPHILS ABSOLUTE: 9.3 K/UL (ref 1.7–7.7)
NEUTROPHILS RELATIVE PERCENT: 76.1 %
PDW BLD-RTO: 14.3 % (ref 12.4–15.4)
PERFORMED ON: ABNORMAL
PERFORMED ON: NORMAL
PHOSPHORUS: 4.1 MG/DL (ref 2.5–4.9)
PLATELET # BLD: 258 K/UL (ref 135–450)
PMV BLD AUTO: 8.3 FL (ref 5–10.5)
POTASSIUM SERPL-SCNC: 3.2 MMOL/L (ref 3.5–5.1)
RBC # BLD: 3.82 M/UL (ref 4.2–5.9)
SODIUM BLD-SCNC: 136 MMOL/L (ref 136–145)
WBC # BLD: 12.3 K/UL (ref 4–11)

## 2021-06-28 PROCEDURE — 97165 OT EVAL LOW COMPLEX 30 MIN: CPT

## 2021-06-28 PROCEDURE — 90935 HEMODIALYSIS ONE EVALUATION: CPT

## 2021-06-28 PROCEDURE — 83735 ASSAY OF MAGNESIUM: CPT

## 2021-06-28 PROCEDURE — 97535 SELF CARE MNGMENT TRAINING: CPT

## 2021-06-28 PROCEDURE — 97116 GAIT TRAINING THERAPY: CPT

## 2021-06-28 PROCEDURE — 1200000000 HC SEMI PRIVATE

## 2021-06-28 PROCEDURE — 97161 PT EVAL LOW COMPLEX 20 MIN: CPT

## 2021-06-28 PROCEDURE — 2580000003 HC RX 258: Performed by: INTERNAL MEDICINE

## 2021-06-28 PROCEDURE — 80048 BASIC METABOLIC PNL TOTAL CA: CPT

## 2021-06-28 PROCEDURE — 2500000003 HC RX 250 WO HCPCS: Performed by: INTERNAL MEDICINE

## 2021-06-28 PROCEDURE — 6370000000 HC RX 637 (ALT 250 FOR IP): Performed by: INTERNAL MEDICINE

## 2021-06-28 PROCEDURE — 85025 COMPLETE CBC W/AUTO DIFF WBC: CPT

## 2021-06-28 PROCEDURE — 84100 ASSAY OF PHOSPHORUS: CPT

## 2021-06-28 PROCEDURE — 94640 AIRWAY INHALATION TREATMENT: CPT

## 2021-06-28 PROCEDURE — 6360000002 HC RX W HCPCS: Performed by: INTERNAL MEDICINE

## 2021-06-28 RX ADMIN — TIOTROPIUM BROMIDE AND OLODATEROL 2 PUFF: 3.124; 2.736 SPRAY, METERED RESPIRATORY (INHALATION) at 08:28

## 2021-06-28 RX ADMIN — HYDRALAZINE HYDROCHLORIDE 25 MG: 25 TABLET, FILM COATED ORAL at 21:30

## 2021-06-28 RX ADMIN — PRAVASTATIN SODIUM 40 MG: 40 TABLET ORAL at 21:30

## 2021-06-28 RX ADMIN — INSULIN LISPRO 20 UNITS: 100 INJECTION, SOLUTION INTRAVENOUS; SUBCUTANEOUS at 17:21

## 2021-06-28 RX ADMIN — HYDRALAZINE HYDROCHLORIDE 25 MG: 25 TABLET, FILM COATED ORAL at 08:14

## 2021-06-28 RX ADMIN — CITALOPRAM HYDROBROMIDE 40 MG: 20 TABLET ORAL at 08:14

## 2021-06-28 RX ADMIN — CALCIUM ACETATE 667 MG: 667 CAPSULE ORAL at 17:14

## 2021-06-28 RX ADMIN — ANTACID TABLETS 1000 MG: 500 TABLET, CHEWABLE ORAL at 17:13

## 2021-06-28 RX ADMIN — INSULIN GLARGINE 70 UNITS: 100 INJECTION, SOLUTION SUBCUTANEOUS at 21:31

## 2021-06-28 RX ADMIN — ANTACID TABLETS 1000 MG: 500 TABLET, CHEWABLE ORAL at 21:29

## 2021-06-28 RX ADMIN — HEPARIN SODIUM 5000 UNITS: 5000 INJECTION INTRAVENOUS; SUBCUTANEOUS at 21:31

## 2021-06-28 RX ADMIN — HEPARIN SODIUM 5000 UNITS: 5000 INJECTION INTRAVENOUS; SUBCUTANEOUS at 05:08

## 2021-06-28 RX ADMIN — HEPARIN SODIUM 5000 UNITS: 5000 INJECTION INTRAVENOUS; SUBCUTANEOUS at 13:46

## 2021-06-28 RX ADMIN — ISOSORBIDE MONONITRATE 30 MG: 30 TABLET, EXTENDED RELEASE ORAL at 08:14

## 2021-06-28 RX ADMIN — Medication 3 MG: at 21:30

## 2021-06-28 RX ADMIN — CALCIUM ACETATE 667 MG: 667 CAPSULE ORAL at 08:14

## 2021-06-28 RX ADMIN — DILTIAZEM HYDROCHLORIDE 180 MG: 180 CAPSULE, COATED, EXTENDED RELEASE ORAL at 08:15

## 2021-06-28 RX ADMIN — CALCIUM ACETATE 667 MG: 667 CAPSULE ORAL at 13:46

## 2021-06-28 RX ADMIN — Medication 10 ML: at 08:15

## 2021-06-28 RX ADMIN — INSULIN LISPRO 9 UNITS: 100 INJECTION, SOLUTION INTRAVENOUS; SUBCUTANEOUS at 13:48

## 2021-06-28 RX ADMIN — LOSARTAN POTASSIUM 50 MG: 25 TABLET, FILM COATED ORAL at 08:14

## 2021-06-28 RX ADMIN — ACETAMINOPHEN 650 MG: 325 TABLET ORAL at 17:14

## 2021-06-28 RX ADMIN — ASPIRIN 81 MG: 81 TABLET, CHEWABLE ORAL at 08:15

## 2021-06-28 RX ADMIN — INSULIN LISPRO 20 UNITS: 100 INJECTION, SOLUTION INTRAVENOUS; SUBCUTANEOUS at 13:49

## 2021-06-28 RX ADMIN — HYDRALAZINE HYDROCHLORIDE 25 MG: 25 TABLET, FILM COATED ORAL at 13:46

## 2021-06-28 RX ADMIN — QUETIAPINE FUMARATE 50 MG: 25 TABLET ORAL at 21:30

## 2021-06-28 RX ADMIN — INSULIN LISPRO 12 UNITS: 100 INJECTION, SOLUTION INTRAVENOUS; SUBCUTANEOUS at 17:20

## 2021-06-28 ASSESSMENT — PAIN SCALES - GENERAL
PAINLEVEL_OUTOF10: 6
PAINLEVEL_OUTOF10: 0
PAINLEVEL_OUTOF10: 8
PAINLEVEL_OUTOF10: 8
PAINLEVEL_OUTOF10: 0
PAINLEVEL_OUTOF10: 8
PAINLEVEL_OUTOF10: 0
PAINLEVEL_OUTOF10: 0

## 2021-06-28 ASSESSMENT — PAIN DESCRIPTION - PAIN TYPE
TYPE: CHRONIC PAIN
TYPE: CHRONIC PAIN

## 2021-06-28 ASSESSMENT — PAIN DESCRIPTION - LOCATION
LOCATION: BACK
LOCATION: BACK

## 2021-06-28 NOTE — CARE COORDINATION
Per prim nurse at [facility], pt resides w/spouse and will likely NOT have needs at discharge. HD MWF at [facility]. Please consult CM team if needs arise.      Mayra Starkey RN

## 2021-06-28 NOTE — FLOWSHEET NOTE
06/27/21 2017   Assessment   Charting Type Shift assessment   Neurological   Neuro (WDL) X  (unable to follow commands to complete full neuro.  )   Level of Consciousness Responds to Voice (1)   Orientation Level Oriented to person;Disoriented to place; Disoriented to time;Disoriented to situation   Cognition Impulsive;Poor judgement;Poor safety awareness;Poor attention/concentration; Short term memory loss; Unable to follow commands   Language Slurred   [place] Coma Scale   Eye Opening 3   Best Verbal Response 4   Best Motor Response 5   [place] Coma Scale Score 12   Respiratory   Respiratory (WDL) X   Respiratory Pattern Tachypneic   Respiratory Depth Shallow   Respiratory Quality/Effort Snoring   Chest Assessment Chest expansion symmetrical;Trachea midline   L Breath Sounds Diminished   R Breath Sounds Diminished   Cardiac   Cardiac (WDL) X   Cardiac Regularity Regular   Cardiac Rhythm NSR;PAC   Cardiac Monitor   Telemetry Monitor On Yes   Telemetry Audible Yes   Telemetry Alarms Set Yes   Telemetry Box Number 89   Gastrointestinal   Abdominal (WDL) X   Abdomen Inspection Distended;Rotund   RUQ Bowel Sounds Active   LUQ Bowel Sounds Active   RLQ Bowel Sounds Active   LLQ Bowel Sounds Active   Peripheral Vascular   Peripheral Vascular (WDL) WDL   Edema None   Skin Color/Condition   Skin Color/Condition (WDL) WDL   Skin Color Appropriate for ethnicity   Skin Condition/Temp Dry; Warm   Skin Integrity   Skin Integrity (WDL) X   Skin Integrity Blister;Bruising   Location BLE   Musculoskeletal   Musculoskeletal (WDL) X  (RONY)   Genitourinary   Genitourinary (WDL) X  (solis )   Anus/Rectum   Anus/Rectum (WDL) WDL   Psychosocial   Psychosocial (WDL) X   Patient Behaviors Not interactive; Uncooperative   Family/Significant Other Communication   Reason update    Relationship Spouse/Significant Other   Method of Communication Phone

## 2021-06-28 NOTE — PLAN OF CARE
Problem: Non-Violent Restraints  Goal: Removal from restraints as soon as assessed to be safe  6/28/2021 0416 by Tawnya Marte RN  Outcome: Met This Shift  6/27/2021 1644 by Avery Chapa RN  Outcome: Ongoing  Goal: No harm/injury to patient while restraints in use  6/28/2021 0416 by Tawnya Marte RN  Outcome: Met This Shift  6/27/2021 1644 by Avery Chapa RN  Outcome: Ongoing  Goal: Patient's dignity will be maintained  6/28/2021 0416 by Tawnya Marte RN  Outcome: Met This Shift  6/27/2021 1644 by Avery Chapa RN  Outcome: Ongoing

## 2021-06-28 NOTE — PROGRESS NOTES
Physical Therapy    Facility/Department: [facility] B3 - MED SURG  Initial Assessment    NAME: Eduar Solis  : 1970  MRN: [id number]    Date of Service: 2021    Discharge Recommendations:  Home with Home health PT, 24 hour supervision or assist   PT Equipment Recommendations  Equipment Needed: No  Other: pt owns RW    Assessment   Body structures, Functions, Activity limitations: Decreased functional mobility ; Decreased balance;Decreased posture;Decreased strength;Decreased endurance;Decreased coordination;Decreased safe awareness  Assessment: Pt is 47 yo male who presents with diagosis of hyperglycemia. Pt mod I with RW at home. Grossly CGA to min A for mobility with RW this date. Limited d/t decreased activity tolerance and safety. Pt would benefit from continued skilled therapy to address deficits. Recommend home with 24-hr sup and home PT at d/c. Treatment Diagnosis: impaired functional mobility  Specific instructions for Next Treatment: progress mobility as tolerated  Prognosis: Good  Decision Making: Low Complexity  PT Education: Goals; General Safety;Gait Training;PT Role;Disease Specific Education;Plan of Care; Functional Mobility Training;Home Exercise Program;Transfer Training;Energy Conservation  Disease Specific Education: Pt educated on importance of OOB mobility, prevention of complications of bedrest, and general safety during hospitalization. Pt verbalized understanding  Barriers to Learning: none  REQUIRES PT FOLLOW UP: Yes  Activity Tolerance  Activity Tolerance: Patient Tolerated treatment well;Patient limited by endurance; Patient limited by fatigue  Activity Tolerance: Post gait seated in chair /71, -117; SpO2 99% on RA       Patient Diagnosis(es): The primary encounter diagnosis was Altered mental status, unspecified altered mental status type. Diagnoses of Acute cystitis with hematuria, Elevated troponin, and Hyperglycemia were also pertinent to this visit.      has a past medical history of Ambulatory dysfunction, Aortic stenosis, Arthritis, Asthma, Bilateral hilar adenopathy syndrome, CAD (coronary artery disease), Cardiomyopathy ([address] CHF (congestive heart failure) ([address] Chronic pain, COPD (chronic obstructive pulmonary disease) ([address] Depression, Diabetes mellitus ([address] Difficult intravenous access, Emphysema of lung ([address] ESRD (end stage renal disease) on dialysis ([address] Fear of needles, Gastric ulcer, GERD (gastroesophageal reflux disease), Heart valve problem, Hemodialysis patient ([address] History of spinal fracture, Hx of blood clots, Hyperlipidemia, Hypertension, MI (myocardial infarction) ([address] Neuromuscular disorder ([address] Numbness and tingling in left arm, Pneumonia, PONV (postoperative nausea and vomiting), Prolonged emergence from general anesthesia, Sleep apnea, Stroke ([address] TIA (transient ischemic attack), and Unspecified diseases of blood and blood-forming organs. has a past surgical history that includes Tonsillectomy; cyst removal (08/14/2013); Colonoscopy; Coronary angioplasty with stent (05/26/15); vascular surgery (aprx 2 years ago); Colonoscopy (2/29/2015); Upper gastrointestinal endoscopy (01/06/2016); Upper gastrointestinal endoscopy (01/29/2017); other surgical history (02/01/2017); Dialysis fistula creation (Left, 10/30/2017); Diagnostic Cardiac Cath Lab Procedure; other surgical history (2018); other surgical history (Bilateral, 06/26/2018); pr lap insertion tunneled intraperitoneal catheter (N/A, 9/21/2018); other surgical history (Right, 09/2018); Dialysis fistula creation (Left, 3/27/2019); other surgical history (05/28/2019); Endoscopy, colon, diagnostic; Catheter Removal (Right, 7/2/2019); and Aortic valve replacement (N/A, 10/15/2019).     Restrictions  Restrictions/Precautions  Restrictions/Precautions: General Precautions, Fall Risk  Position Activity Restriction  Other position/activity restrictions: Up in chair, avasys  Vision/Hearing  Vision: Impaired  Vision Exceptions: Wears glasses for reading  Hearing: Within functional limits     Subjective  General  Chart Reviewed: Yes  Patient assessed for rehabilitation services?: Yes  Additional Pertinent Hx: Dialysis MWF  Response To Previous Treatment: Not applicable  Family / Caregiver Present: No  Referring Practitioner: Dr. Shaye Ramos MD  Referral Date : 06/28/21  Diagnosis: Hyperglycemia  Follows Commands: Within Functional Limits  General Comment  Comments: Pt seated EOB on approach; RN cleared pt for therapy  Subjective  Subjective: Pt agreeable to therapy  Pain Screening  Patient Currently in Pain: No    Orientation  Orientation  Overall Orientation Status: Within Functional Limits  Social/Functional History  Social/Functional History  Lives With: Spouse  Type of Home: House  Home Layout: One level  Home Access: Ramped entrance  Bathroom Shower/Tub: Tub/Shower unit, Walk-in shower, Shower chair with back  [employer]: Standard (pt planning to get renovated to handicap height)  Bathroom Equipment: Grab bars in shower, Tub transfer bench  Home Equipment: Rolling walker, Electric scooter (uses scooter outside of home, RW sometimes inside of home)  ADL Assistance: Independent  Homemaking Responsibilities:  (pt was doing cooking and laundry)  Ambulation Assistance: Independent  Transfer Assistance: Independent  Active :  Yes  Additional Comments: pt reporting 3-4 falls within the last month or so    Objective     RLE AROM: WFL  LLE AROM : WFL  Strength RLE  Comment: Grossly 4/5 throughout  Strength LLE  Comment: Grossly 4/5 throughout     Sensation  Overall Sensation Status: Impaired (bilateral hands and feet)     Bed mobility  Supine to Sit: Unable to assess  Sit to Supine: Unable to assess  Comment: pt seated at start and end of session     Transfers  Sit to Stand: Minimal Assistance;Contact guard assistance (CGA from EOB, min A from toilet cues for safe hand placement with standing)  Stand to sit: Contact guard assistance (cues for full turn prior to sitting and for safe hand placement)     Ambulation  Ambulation?: Yes  Ambulation 1  Surface: level tile  Device: Rolling Walker  Quality of Gait: Cues for sequencing and to maintain ALANNAH within RW. Decreased safety with turns including full turn to sit in chair  Gait Deviations: Shuffles;Decreased step length;Decreased step height  Distance: 15 ft + 25 ft     Balance  Sitting - Static: Good  Sitting - Dynamic: Good  Standing - Static: Fair;+  Standing - Dynamic: [address]  Times per week: 3-5x/wk  Times per day: Daily  Specific instructions for Next Treatment: progress mobility as tolerated  Current Treatment Recommendations: Strengthening, Neuromuscular Re-education, Safety Education & Training, Balance Training, Endurance Training, Functional Mobility Training, Transfer Training, Gait Training, Equipment Evaluation, Education, & procurement, Patient/Caregiver Education & Training  Safety Devices  Type of devices: All fall risk precautions in place, Call light within reach, Chair alarm in place, Gait belt, Patient at risk for falls, Nurse notified, Left in chair, Telesitter in use                                   AM-PAC Score  AM-PAC Inpatient Mobility Raw Score : 20 (06/28/21 1453)  AM-PAC Inpatient T-Scale Score : 47.67 (06/28/21 1453)  Mobility Inpatient CMS 0-100% Score: 35.83 (06/28/21 1453)  Mobility Inpatient CMS G-Code Modifier : Malik Roldan (06/28/21 [facility])          Goals  Short term goals  Time Frame for Short term goals: 1 week (7/05) unless otherwise specified  Short term goal 1: Pt will be mod I with bed mobility. Short term goal 2: Pt will be supervision with transfers with RW. Short term goal 3: Pt will ambulate 50 ft with RW and supervision. Short term goal 4: 7/02: Pt will participate in 12-15 reps of BLE exercises to promote strength and activity tolerance.   Patient Goals   Patient goals : Sherri Salgado go home\" Therapy Time   Individual Concurrent Group Co-treatment   Time In 1315         Time Out 1340         Minutes 25         Timed Code Treatment Minutes: 5609 Tim Antonio, PT, DPT  If pt is unable to be seen after this session, please let this note serve as discharge summary. Please see case management note for discharge disposition. Thank you.

## 2021-06-28 NOTE — PROGRESS NOTES
Hospitalist Progress Note      PCP: Mercedez Winston MD    Date of Admission: 6/26/2021    Chief Complaint: hyperglycemia, AMS    Hospital Course:   Ike Sheikh is a 46 y.o. male. He presents from home via ambulance. His wife activated EMS for hyperglycemia with altered mental status, namely he was agitated. He was so agitated, in fact, that he required sedation in the ER. By the time of my evaluation he had received lorazepam 5mg IV and very somnolent. Too somnolent to provide any history. Subjective: Much more awake following additional HD yesterday. Still slightly drowsy. Plan for additional HD today.       Medications:  Reviewed    Infusion Medications    sodium chloride      dextrose       Scheduled Medications    aspirin  81 mg Oral Daily    Calcium Acetate (Phos Binder)  667 mg Oral TID WC    citalopram  40 mg Oral Daily    dilTIAZem  180 mg Oral Daily    hydrALAZINE  25 mg Oral TID    isosorbide mononitrate  30 mg Oral Daily    losartan  50 mg Oral Daily    pantoprazole  40 mg Oral QAM AC    pravastatin  40 mg Oral Nightly    QUEtiapine  50 mg Oral QPM    tiotropium-olodaterol  2 puff Inhalation Daily    heparin (porcine)  5,000 Units Subcutaneous 3 times per day    insulin lispro  0-18 Units Subcutaneous TID WC    insulin lispro  0-9 Units Subcutaneous Nightly    insulin glargine  70 Units Subcutaneous Nightly    insulin lispro  20 Units Subcutaneous TID WC     PRN Meds: haloperidol lactate, sodium chloride flush, sodium chloride, ondansetron **OR** ondansetron, acetaminophen **OR** acetaminophen, melatonin, calcium carbonate, hydrALAZINE, LORazepam, ziprasidone, glucose, dextrose, glucagon (rDNA), dextrose      Intake/Output Summary (Last 24 hours) at 6/28/2021 0845  Last data filed at 6/28/2021 0502  Gross per 24 hour   Intake 0 ml   Output 1025 ml   Net -1025 ml       Physical Exam Performed:    BP (!) 170/92   Pulse 99   Temp 97.7 °F (36.5 °C) (Axillary)   Resp 20 Ht 5' 7\" (1.702 m)   Wt 260 lb 2.3 oz (118 kg)   SpO2 98%   BMI 40.74 kg/m²     General appearance: No apparent distress, obese, confused  HEENT: Pupils equal, round, and reactive to light. Conjunctivae/corneas clear. Neck: Supple, with full range of motion. No jugular venous distention. Trachea midline. Respiratory:  Normal respiratory effort. Clear to auscultation, bilaterally without Rales/Wheezes/Rhonchi. Cardiovascular: Regular rate and rhythm with normal S1/S2 without murmurs, rubs or gallops. Abdomen: Soft, non-tender, non-distended with normal bowel sounds. Musculoskeletal: No clubbing, cyanosis or edema bilaterally. Full range of motion without deformity. Skin: Skin color, texture, turgor normal.  No rashes or lesions. Neurologic:  Neurovascularly intact without any focal sensory/motor deficits. Cranial nerves: II-XII intact, grossly non-focal.  Psychiatric: severely agitated, disoriented  Capillary Refill: Brisk,3 seconds, normal   Peripheral Pulses: +2 palpable, equal bilaterally       Labs:   Recent Labs     06/26/21 2134 06/27/21 0630 06/28/21  0641   WBC 13.2* 13.7* 12.3*   HGB 11.2* 11.6* 11.6*   HCT 33.1* 33.3* 33.6*    215 258     Recent Labs     06/26/21 2134 06/27/21 0630 06/28/21  0641   * 135* 136   K 3.5 3.5 3.2*   CL 86* 98* 99   CO2 25 22 23   BUN 45* 43* 29*   CREATININE 6.4* 6.3* 5.0*   CALCIUM 9.1 8.8 8.7   PHOS  --  3.0 4.1     Recent Labs     06/26/21 2134   AST 13*   ALT 13   BILITOT <0.2   ALKPHOS 119     No results for input(s): INR in the last 72 hours.   Recent Labs     06/26/21 2134   TROPONINI 0.21*       Urinalysis:      Lab Results   Component Value Date    NITRU Negative 06/26/2021    WBCUA 6-9 06/26/2021    BACTERIA Rare 06/26/2021    RBCUA 11-20 06/26/2021    BLOODU SMALL 06/26/2021    SPECGRAV 1.015 06/26/2021    GLUCOSEU >=1000 06/26/2021       Radiology:  CT Head WO Contrast   Final Result   Mild atrophy and mild chronic microischemic disease scattered in the deep   white matter which is unchanged with no acute abnormality seen. XR CHEST PORTABLE   Final Result   Clear lungs. Cardiomegaly. No acute cardiopulmonary abnormality. Assessment/Plan:    Active Hospital Problems    Diagnosis     Dyslipidemia [E78.5]      Priority: High    CAD (coronary artery disease) [I25.10]      Priority: High    S/P TAVR (transcatheter aortic valve replacement) [Z95.2]      Priority: Medium    Essential hypertension [I10]      Priority: Medium    Type 2 diabetes mellitus with hyperglycemia (HCC) [E11.65]     Acute encephalopathy [G93.40]     ESRD (end stage renal disease) on dialysis ([address] [N18.6, Z99.2]     ZAINAB on CPAP [G47.33, Z99.89]     Obesity (BMI 30-39. 9) [E66.9]      Acute metabolic encephalopathy  - 2/2 hyperglycemia and missed HD  - CT head negative  - VBG unremarkable  - s/p ativan and geodon for severe agitation  - much improved following HD  - supportive care    DMII  - poorly controlled  - continue basal/bolus insulin    ESRD  - Nephrology consulted  - HD MWF  - had been non-compliant with HD last week  - s/p emergent HD yesterday. Resume regular schedule today    CAD  - no active chest pain  - continue ASA, statin, imdur    HTN  - poorly controlled  - continue home losartan, cardizem, hydralazine    HLD  - continue home statin    COPD  - no evidence of exacerbation  - continue home inhalers    Anemia  - 2/2 ESRD  - at baseline  - continue to monitor    ZAINAB  - CPAP    Morbid Obesity  With Body mass index is 79.30 kg/m². Complicating assessment and treatment. Placing patient at risk for multiple co-morbidities as well as early death and contributing to the patient's presentation. Counseled on weight loss. DVT Prophylaxis: SQH  Diet: ADULT DIET; Regular; 4 carb choices (60 gm/meal); Low Fat/Low Chol/High Fiber/NIDA; Low Potassium (Less than 3000 mg/day); Low Phosphorus (Less than 1000 mg);  Less than 60 gm  Code Status: Full Code    PT/OT Eval Status: ordered    Dispo - possibly home tomorrow    Ivan Grady MD

## 2021-06-28 NOTE — PROGRESS NOTES
Patient more alert this AM and conversing appropriately with staff. Patient has been calm and no longer agitated. Wrist restraints removed at this time. [employer] continues to monitor patient remotely.

## 2021-06-28 NOTE — PROGRESS NOTES
Occupational Therapy   Occupational Therapy Initial Assessment/ Treatment  Date: 2021   Patient Name: Varinder Abad  MRN: [id number]     : 1970    Date of Service: 2021    Discharge Recommendations:  Home with Home health OT  OT Equipment Recommendations  Other: pt could benefit from [address] scored a 18/24 on the AM-PAC ADL Inpatient form. Current research shows that an AM-PAC score of 18 or greater is typically associated with a discharge to the patient's home setting. Based on the patient's AM-PAC score, and their current ADL deficits, it is recommended that the patient have 2-3 sessions per week of Occupational Therapy at d/c to increase the patient's independence. At this time, this patient demonstrates the endurance and safety to discharge home with home vs OP services and a follow up treatment frequency of 2-3x/wk. Please see assessment section for further patient specific details. If patient discharges prior to next session this note will serve as a discharge summary. Please see below for the latest assessment towards goals. Assessment   Performance deficits / Impairments: Decreased functional mobility ; Decreased endurance;Decreased coordination;Decreased ADL status; Decreased balance  Assessment: Prior to admission pt was living at home with his wife, was independent with ADLs and was sharing IADLs with wife. Pt now presents below his baseline, demonstrating CGA for funcitonal mobiltiy and min A for sit to stand transfers. Pt would benefit from continued OT while in acute care, [facility] score indicates homebound discharge. Would benefit from [address] [place] [address]'[place] at [place].   Treatment Diagnosis: decreased ADLs and transfers secondary to hyperglycemia  Prognosis: Fair  Decision Making: Medium Complexity  OT Education: OT Role;Plan of Care  Patient Education: verb understanding  REQUIRES OT FOLLOW UP: Yes  Activity Tolerance  Activity Tolerance: Patient Tolerated treatment well;Patient limited by fatigue  Activity Tolerance: Pt reported fatigue after mobility to bedside chair. 132/71, , O2 99%  Safety Devices  Safety Devices in place: Yes  Type of devices: Left in chair;Nurse notified;Call light within reach; Chair alarm in place           Patient Diagnosis(es): The primary encounter diagnosis was Altered mental status, unspecified altered mental status type. Diagnoses of Acute cystitis with hematuria, Elevated troponin, and Hyperglycemia were also pertinent to this visit. has a past medical history of Ambulatory dysfunction, Aortic stenosis, Arthritis, Asthma, Bilateral hilar adenopathy syndrome, CAD (coronary artery disease), Cardiomyopathy ([address] CHF (congestive heart failure) ([address] Chronic pain, COPD (chronic obstructive pulmonary disease) ([address] Depression, Diabetes mellitus ([address] Difficult intravenous access, Emphysema of lung ([address] ESRD (end stage renal disease) on dialysis ([address] Fear of needles, Gastric ulcer, GERD (gastroesophageal reflux disease), Heart valve problem, Hemodialysis patient ([address] History of spinal fracture, Hx of blood clots, Hyperlipidemia, Hypertension, MI (myocardial infarction) ([address] Neuromuscular disorder ([address] Numbness and tingling in left arm, Pneumonia, PONV (postoperative nausea and vomiting), Prolonged emergence from general anesthesia, Sleep apnea, Stroke ([address] TIA (transient ischemic attack), and Unspecified diseases of blood and blood-forming organs. has a past surgical history that includes Tonsillectomy; cyst removal (08/14/2013); Colonoscopy; Coronary angioplasty with stent (05/26/15); vascular surgery (aprx 2 years ago); Colonoscopy (2/29/2015); Upper gastrointestinal endoscopy (01/06/2016); Upper gastrointestinal endoscopy (01/29/2017); other surgical history (02/01/2017); Dialysis fistula creation (Left, 10/30/2017);  Diagnostic Cardiac Cath Lab Procedure; other surgical history (2018); other surgical history (Bilateral, 06/26/2018); pr lap insertion tunneled intraperitoneal catheter (N/A, 9/21/2018); other surgical history (Right, 09/2018); Dialysis fistula creation (Left, 3/27/2019); other surgical history (05/28/2019); Endoscopy, colon, diagnostic; Catheter Removal (Right, 7/2/2019); and Aortic valve replacement (N/A, 10/15/2019). Treatment Diagnosis: decreased ADLs and transfers secondary to hyperglycemia      Restrictions  Restrictions/Precautions  Restrictions/Precautions: General Precautions, Fall Risk  Position Activity Restriction  Other position/activity restrictions: Up in chair, avasys    Subjective   General  Chart Reviewed: Yes  Patient assessed for rehabilitation services?: Yes  Additional Pertinent Hx: Pt admitted to ED with c/o hyperglycemia and AMS. PMHx includes:  has a past medical history of Ambulatory dysfunction, Aortic stenosis, Arthritis, Asthma, 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/2013), CAD (coronary artery disease), Cardiomyopathy ([address] (04/19/2019), CHF (congestive heart failure) ([address] Chronic pain, COPD (chronic obstructive pulmonary disease) ([address] Depression, Diabetes mellitus ([address] Difficult intravenous access, Emphysema of lung ([address] ESRD (end stage renal disease) on dialysis ([address] Fear of needles, Gastric ulcer, GERD (gastroesophageal reflux disease), Heart valve problem, Hemodialysis patient ([address] History of spinal fracture, blood clots, Hyperlipidemia, Hypertension, MI (myocardial infarction) ([address] (2019), Neuromuscular disorder ([address] Numbness and tingling in left arm, Pneumonia, PONV (postoperative nausea and vomiting), Prolonged emergence from general anesthesia, Sleep apnea, Stroke ([address] TIA (transient ischemic attack), and Unspecified diseases of blood and blood-forming organs. Family / Caregiver Present: No  Referring Practitioner: Devante Viramontes MD  Diagnosis: hyperglycemia  Subjective  Subjective: Pt seated EOB upon OT arrival, agreeable to OT eval and treat.   General Comment  Comments: RN cleared pt to participate in OT eval and treat. Social/Functional History  Social/Functional History  Lives With: Spouse  Type of Home: House  Home Layout: One level  Home Access: Ramped entrance  Bathroom Shower/Tub: Tub/Shower unit, Walk-in shower, Shower chair with back  [employer]: Standard (pt planning to get renovated to handicap height)  [employer]: Grab bars in shower, Tub transfer bench  Home Equipment: Rolling walker, Electric scooter (uses scooter outside of home, RW sometimes inside of home)  ADL Assistance: Independent  Homemaking Responsibilities:  (pt was doing cooking and laundry)  Ambulation Assistance: Independent  Transfer Assistance: Independent  Active : Yes  Additional Comments: pt reporting 3-4 falls within the last month or so       Objective   Vision: Impaired  Vision Exceptions: Wears glasses for reading  Hearing: Within functional limits    Orientation  Overall Orientation Status: Within Functional Limits     Balance  Sitting Balance: Supervision  Standing Balance: Contact guard assistance  Standing Balance  Time: 5+5 mins  Activity: mobiltiy into bathroom, standing at sink then mobility around bed to bedside chair  Functional Mobility  Functional - Mobility Device: Rolling Walker  Activity: To/from bathroom; Other (+ around bed to bedside chair)  Assist Level: Contact guard assistance  Toilet Transfers  Toilet - Technique: Ambulating  Equipment Used: Standard toilet  Toilet Transfer: Minimal assistance  ADL  Feeding: Setup; Beverage management  Grooming: Contact guard assistance (standing at RW for hand hygiene)  LE Dressing: Setup; Moderate assistance (donning socks seated EOB, assist to thread brief over LEs and through catheter)  Toileting:  Moderate assistance (assist for meatal care, assist to pull brief up on one side)           Transfers  Sit to stand: Minimal assistance  Stand to sit: Minimal assistance              Sensation  Overall Sensation Status: Impaired (bilateral hands and feet)                Treatment included functional transfer training, ADLs, and patient education. Plan   Plan  Times per week: 3-5  Times per day: Daily  Current Treatment Recommendations: Strengthening, Balance Training, Functional Mobility Training, Endurance Training, Self-Care / ADL, Neuromuscular Re-education       Goals  Short term goals  Time Frame for Short term goals: by 1 week unless otherwise specified  Short term goal 1: Pt will complete toileting including transfers with SBA  Short term goal 2: Pt will complete standing level grooming with spv  Short term goal 3: Pt will complete B UE HEP x 20 reps to increase activity tolerance for ADLs  Patient Goals   Patient goals : to go home       Therapy Time   Individual Concurrent Group Co-treatment   Time In 1315         Time Out 1340         Minutes 25         Timed Code Treatment Minutes: 15 Minutes (+10 min eval)     Elizabeth SULLIVAN  [facility], OTR/L [id number]

## 2021-06-28 NOTE — PROGRESS NOTES
Patient back in room from HD via transport. Patient lethargic however will . VSS. Wife updated via telephone. [facility] notified of patient's return to the room.

## 2021-06-28 NOTE — PROGRESS NOTES
Pt verbalizing pain in back, states takes percocet at home. Pt BP currently low. Tylenol give and non-pharmacological therapies administered. With some relief. Will continue to re-assess bp.

## 2021-06-28 NOTE — FLOWSHEET NOTE
06/28/21 0900 06/28/21 1220   Vital Signs   BP (!) 145/77 128/69   Temp 97.8 °F (36.6 °C) 97.6 °F (36.4 °C)   Pulse 98 102   Resp 16 16   Weight 260 lb 9.3 oz (118.2 kg) 254 lb 13.6 oz (115.6 kg)   Weight Method Bed scale Bed scale   Post-Hemodialysis Assessment   NET Removed (ml)  --  2000 ml   Treatment time: 3 hours  Net UF: 2000 ml    Pre weight: 118.2 kg   Post weight: 115.6 kg  EDW: 119 kg    Access used:[facility]  Access function: good with  ml/min    Medications or blood products given: none    Regular outpatient schedule: [facility]    Summary of response to treatment: good- BM X 4 in HD pt put on bed pan each time with little stool pt feels constipated      Copy of dialysis treatment record placed in chart, to be scanned into EMR.

## 2021-06-28 NOTE — PROGRESS NOTES
Progress Note    HISTORY     CC:   Altered Mental Status            We are following for ESRD       Subjective/   HPI:  Seen on dialysis. He is more clear today. Says he does not know what happened. BP stable. Tolerating dialysis.      ROS:  Constitutional:  No fevers, No Chills, + weakness  Cardiovascular:  No palpations, no edema  Respiratory:  No wheezing, no cough  Skin:  No rash, no itching  :  Not making much urine     Social Hx:  No family at bedside     Past Medical and Surgical History:  - Reviewed, no changes     EXAM       Objective/     Vitals:    06/28/21 0900 06/28/21 1220 06/28/21 1230 06/28/21 1545   BP: (!) 145/77 128/69 130/69 (!) 92/55   Pulse: 98 102 98 100   Resp: 16 16 20 18   Temp: 97.8 °F (36.6 °C) 97.6 °F (36.4 °C) 97.9 °F (36.6 °C) 97.8 °F (36.6 °C)   TempSrc:   Oral Oral   SpO2:   96% 96%   Weight: 260 lb 9.3 oz (118.2 kg) 254 lb 13.6 oz (115.6 kg)     Height:         24HR INTAKE/OUTPUT:      Intake/Output Summary (Last 24 hours) at 6/28/2021 1626  Last data filed at 6/28/2021 1220  Gross per 24 hour   Intake 140 ml   Output 2950 ml   Net -2810 ml     Constitutional:  Alert, awake, no apparent distress  Eyes:  Pupils reactive, sclera clear   Neck:  Normal thyroid, no masses   Cardiovascular:  Regular, no rub  Respiratory:  No distress, no wheezing  Psychiatry:  Flat mood/affect, alert  Abdomen: +bs, soft, nt, no masses   Musculoskeletal: No LE edema, no clubbing   Lymphatics:  No LAD in neck, no supraclavicular nodes   Access:  [facility]      MEDICAL DECISION MAKING       Data/  Recent Labs     06/26/21 2134 06/27/21 0630 06/28/21  0641   WBC 13.2* 13.7* 12.3*   HGB 11.2* 11.6* 11.6*   HCT 33.1* 33.3* 33.6*   MCV 89.2 87.6 88.0    215 258     Recent Labs     06/26/21 2134 06/27/21  0630 06/28/21  0641   * 135* 136   K 3.5 3.5 3.2*   CL 86* 98* 99   CO2 25 22 23   GLUCOSE 606* 105* 122*   PHOS  --  3.0 4.1   MG 2.40 2.50* 2.60*   BUN 45* 43* 29*   CREATININE 6.4* 6. 3* 5.0*   LABGLOM 9* 9* 12*   GFRAA 11* 11* 15*       Assessment/     End stage renal disease - on HD Mon-Wed-Fri     Acute metabolic encephalopathy - multi-factorial    Improving with better glucose readings and dialysis      - Hyperglycemia     - Hyponatremia - secondary to translocation from hyperglycemia, better with blood sugar correction     - Hypertension - poorly controlled     - Anemia of chronic disease - Hb 11.6    Plan/     HD today, UF as tolerated   Follow labs   -----------------------------  Maira Carter M.D.   [facility]

## 2021-06-28 NOTE — PROGRESS NOTES
MD notified: pt BP now 92/55 , returned from dialysis at 12:30 bp was 130/69 HR 98, Oral Hydralazine given at 1:45pm. Pt drowsy in room not narcotics given today. Will continue to monitor and look for orders if you choose to place.

## 2021-06-29 VITALS
HEART RATE: 89 BPM | HEIGHT: 67 IN | BODY MASS INDEX: 40.12 KG/M2 | SYSTOLIC BLOOD PRESSURE: 110 MMHG | OXYGEN SATURATION: 96 % | DIASTOLIC BLOOD PRESSURE: 55 MMHG | WEIGHT: 255.6 LBS | TEMPERATURE: 97.6 F | RESPIRATION RATE: 18 BRPM

## 2021-06-29 LAB
ANION GAP SERPL CALCULATED.3IONS-SCNC: 13 MMOL/L (ref 3–16)
BANDED NEUTROPHILS RELATIVE PERCENT: 10 % (ref 0–7)
BASOPHILS ABSOLUTE: 0.1 K/UL (ref 0–0.2)
BASOPHILS RELATIVE PERCENT: 1 %
BUN BLDV-MCNC: 28 MG/DL (ref 7–20)
CALCIUM SERPL-MCNC: 9 MG/DL (ref 8.3–10.6)
CHLORIDE BLD-SCNC: 96 MMOL/L (ref 99–110)
CO2: 25 MMOL/L (ref 21–32)
CREAT SERPL-MCNC: 4.8 MG/DL (ref 0.9–1.3)
EOSINOPHILS ABSOLUTE: 0.1 K/UL (ref 0–0.6)
EOSINOPHILS RELATIVE PERCENT: 1 %
GFR AFRICAN AMERICAN: 15
GFR NON-AFRICAN AMERICAN: 13
GLUCOSE BLD-MCNC: 104 MG/DL (ref 70–99)
GLUCOSE BLD-MCNC: 106 MG/DL (ref 70–99)
GLUCOSE BLD-MCNC: 98 MG/DL (ref 70–99)
HCT VFR BLD CALC: 32.7 % (ref 40.5–52.5)
HEMOGLOBIN: 11.2 G/DL (ref 13.5–17.5)
LYMPHOCYTES ABSOLUTE: 2.5 K/UL (ref 1–5.1)
LYMPHOCYTES RELATIVE PERCENT: 18 %
MAGNESIUM: 2.4 MG/DL (ref 1.8–2.4)
MCH RBC QN AUTO: 30.5 PG (ref 26–34)
MCHC RBC AUTO-ENTMCNC: 34.4 G/DL (ref 31–36)
MCV RBC AUTO: 88.7 FL (ref 80–100)
MONOCYTES ABSOLUTE: 1.4 K/UL (ref 0–1.3)
MONOCYTES RELATIVE PERCENT: 10 %
NEUTROPHILS ABSOLUTE: 9.7 K/UL (ref 1.7–7.7)
NEUTROPHILS RELATIVE PERCENT: 60 %
PDW BLD-RTO: 14.7 % (ref 12.4–15.4)
PERFORMED ON: ABNORMAL
PERFORMED ON: NORMAL
PHOSPHORUS: 4.1 MG/DL (ref 2.5–4.9)
PLATELET # BLD: 234 K/UL (ref 135–450)
PMV BLD AUTO: 8.6 FL (ref 5–10.5)
POLYCHROMASIA: ABNORMAL
POTASSIUM SERPL-SCNC: 3.5 MMOL/L (ref 3.5–5.1)
RBC # BLD: 3.69 M/UL (ref 4.2–5.9)
SCHISTOCYTES: ABNORMAL
SMUDGE CELLS: PRESENT
SODIUM BLD-SCNC: 134 MMOL/L (ref 136–145)
WBC # BLD: 13.8 K/UL (ref 4–11)

## 2021-06-29 PROCEDURE — 83735 ASSAY OF MAGNESIUM: CPT

## 2021-06-29 PROCEDURE — 2500000003 HC RX 250 WO HCPCS: Performed by: INTERNAL MEDICINE

## 2021-06-29 PROCEDURE — 80048 BASIC METABOLIC PNL TOTAL CA: CPT

## 2021-06-29 PROCEDURE — 85025 COMPLETE CBC W/AUTO DIFF WBC: CPT

## 2021-06-29 PROCEDURE — 84100 ASSAY OF PHOSPHORUS: CPT

## 2021-06-29 PROCEDURE — 6370000000 HC RX 637 (ALT 250 FOR IP): Performed by: INTERNAL MEDICINE

## 2021-06-29 PROCEDURE — 6360000002 HC RX W HCPCS: Performed by: INTERNAL MEDICINE

## 2021-06-29 PROCEDURE — 2580000003 HC RX 258: Performed by: INTERNAL MEDICINE

## 2021-06-29 RX ADMIN — ISOSORBIDE MONONITRATE 30 MG: 30 TABLET, EXTENDED RELEASE ORAL at 08:53

## 2021-06-29 RX ADMIN — LOSARTAN POTASSIUM 50 MG: 25 TABLET, FILM COATED ORAL at 08:53

## 2021-06-29 RX ADMIN — HYDRALAZINE HYDROCHLORIDE 25 MG: 25 TABLET, FILM COATED ORAL at 08:53

## 2021-06-29 RX ADMIN — HEPARIN SODIUM 5000 UNITS: 5000 INJECTION INTRAVENOUS; SUBCUTANEOUS at 06:04

## 2021-06-29 RX ADMIN — DILTIAZEM HYDROCHLORIDE 180 MG: 180 CAPSULE, COATED, EXTENDED RELEASE ORAL at 08:53

## 2021-06-29 RX ADMIN — CITALOPRAM HYDROBROMIDE 40 MG: 20 TABLET ORAL at 08:53

## 2021-06-29 RX ADMIN — PANTOPRAZOLE SODIUM 40 MG: 40 TABLET, DELAYED RELEASE ORAL at 06:04

## 2021-06-29 RX ADMIN — ASPIRIN 81 MG: 81 TABLET, CHEWABLE ORAL at 08:52

## 2021-06-29 RX ADMIN — CALCIUM ACETATE 667 MG: 667 CAPSULE ORAL at 08:52

## 2021-06-29 RX ADMIN — Medication 10 ML: at 08:53

## 2021-06-29 ASSESSMENT — PAIN SCALES - GENERAL
PAINLEVEL_OUTOF10: 0
PAINLEVEL_OUTOF10: 0

## 2021-06-29 NOTE — CARE COORDINATION
CASE MANAGEMENT DISCHARGE SUMMARY      Discharge to: home with [facility]    Transportation:    Family/car: spouse    Confirmed discharge plan with:     Patient: yes     Family: [facility], primary nurse, states spouse is \"on the way to hospital now\" to get patient     Facility/Agency, name:  [facility] obtained via epic access per [facility] liaison, Ling Winston RN, name: [facility]    Note: Discharging nurse to complete [facility], reconcile AVS, and place final copy with patient's discharge packet.      Ramón Prakash RN

## 2021-06-29 NOTE — DISCHARGE SUMMARY
Hospital Medicine Discharge Summary    Patient ID: Tex Jara      Patient's PCP: Natali Tripp MD    Admit Date: 6/26/2021     Discharge Date: 6/29/2021      Admitting Physician: Octavia Garcia MD     Discharge Physician: Asha Olsen MD     Discharge Diagnoses: Active Hospital Problems    Diagnosis     Dyslipidemia [E78.5]      Priority: High    CAD (coronary artery disease) [I25.10]      Priority: High    S/P TAVR (transcatheter aortic valve replacement) [Z95.2]      Priority: Medium    Essential hypertension [I10]      Priority: Medium    Type 2 diabetes mellitus with hyperglycemia (HCC) [E11.65]     Acute encephalopathy [G93.40]     ESRD (end stage renal disease) on dialysis ([facility] [address] [N18.6, Z99.2]     ZAINAB on CPAP [G47.33, Z99.89]     Obesity (BMI 30-39. 9) [E66.9]        The patient was seen and examined on day of discharge and this discharge summary is in conjunction with any daily progress note from day of discharge. Hospital Course:   Marcus Ann is a 46 y. o. male.   He presents from home via ambulance.  His wife activated EMS for hyperglycemia with altered mental status, namely he was agitated.  He was so agitated, in fact, that he required sedation in the ER.  By the time of my evaluation he had received lorazepam 5mg IV and very somnolent.  Too somnolent to provide any history. Acute metabolic encephalopathy  - 2/2 hyperglycemia and missed HD  - CT head negative  - VBG unremarkable  - s/p ativan and geodon for severe agitation  - much improved following HD  - supportive care     DMII  - poorly controlled  - resume home regimen on discharge     ESRD  - Nephrology consulted  - had been non-compliant with HD last week  - s/p urgent HD.  Continue regular HD MWF     CAD  - no active chest pain  - continue ASA, statin, imdur     HTN  - poorly controlled  - continue home losartan, cardizem, hydralazine     HLD  - continue home statin     COPD  - no evidence of exacerbation  - continue home inhalers     Anemia  - 2/2 ESRD  - at baseline     ZAINAB  - CPAP     Morbid Obesity  With Body mass index is 03.58 kg/m². Complicating assessment and treatment. Placing patient at risk for multiple co-morbidities as well as early death and contributing to the patient's presentation. Counseled on weight loss. Physical Exam Performed:     BP (!) 110/55   Pulse 89   Temp 97.6 °F (36.4 °C) (Oral)   Resp 18   Ht 5' 7\" (1.702 m)   Wt 255 lb 9.6 oz (115.9 kg)   SpO2 96%   BMI 40.03 kg/m²       General appearance: No apparent distress, obese, confused  HEENT: Pupils equal, round, and reactive to light. Conjunctivae/corneas clear. Neck: Supple, with full range of motion. No jugular venous distention. Trachea midline. Respiratory:  Normal respiratory effort. Clear to auscultation, bilaterally without Rales/Wheezes/Rhonchi. Cardiovascular: Regular rate and rhythm with normal S1/S2 without murmurs, rubs or gallops. Abdomen: Soft, non-tender, non-distended with normal bowel sounds. Musculoskeletal: No clubbing, cyanosis or edema bilaterally. Full range of motion without deformity. Skin: Skin color, texture, turgor normal.  No rashes or lesions. Neurologic:  Neurovascularly intact without any focal sensory/motor deficits. Cranial nerves: II-XII intact, grossly non-focal.  Psychiatric: severely agitated, disoriented  Capillary Refill: Brisk,3 seconds, normal   Peripheral Pulses: +2 palpable, equal bilaterally     Labs:  For convenience and continuity at follow-up the following most recent labs are provided:      CBC:    Lab Results   Component Value Date    WBC 13.8 06/29/2021    HGB 11.2 06/29/2021    HCT 32.7 06/29/2021     06/29/2021       Renal:    Lab Results   Component Value Date     06/29/2021    K 3.5 06/29/2021    K 3.5 06/26/2021    CL 96 06/29/2021    CO2 25 06/29/2021    BUN 28 06/29/2021    CREATININE 4.8 06/29/2021    CALCIUM 9.0 06/29/2021    PHOS 4.1 06/29/2021         Significant Diagnostic Studies    Radiology:   CT Head WO Contrast   Final Result   Mild atrophy and mild chronic microischemic disease scattered in the deep   white matter which is unchanged with no acute abnormality seen. XR CHEST PORTABLE   Final Result   Clear lungs. Cardiomegaly. No acute cardiopulmonary abnormality. Consults:     IP CONSULT TO HOSPITALIST  IP CONSULT TO NEPHROLOGY  IP CONSULT TO RESPIRATORY CARE  IP CONSULT TO HOME CARE NEEDS    Disposition:  [facility]    Condition at Discharge: Stable    Discharge Instructions/Follow-up:  Follow up with PCP, nephrology within 1-2 weeks    Code Status:  Full code    Activity: activity as tolerated    Diet: renal diet      Discharge Medications:     Discharge Medication List as of 6/29/2021 11:40 AM           Details   oxyCODONE-acetaminophen (PERCOCET) 7.5-325 MG per tablet Take 1 tablet by mouth every 4-6 hours as needed for Pain for up to 30 days. , Disp-150 tablet, R-0Normal      hydrocortisone (ANUSOL-HC) 2.5 % CREA rectal cream Place rectally 2 times daily, Rectal, 2 TIMES DAILY Starting Wed 6/9/2021, Disp-30 g, R-0, Normal      docusate sodium (COLACE) 100 MG capsule Take 1 capsule by mouth 2 times daily, Disp-60 capsule, R-0Normal      famotidine (PEPCID) 20 MG tablet TAKE 1 TABLET BY MOUTH TWICE DAILY AS NEEDED FOR HEARTBURN, Disp-180 tablet, R-0**Patient requests 90 days supply**Normal      hydrALAZINE (APRESOLINE) 50 MG tablet TAKE 1/2 TABLET BY MOUTH EVERY 8 HOURS, Disp-90 tablet, R-2Normal      cyclobenzaprine (FLEXERIL) 10 MG tablet TAKE 1 TABLET BY MOUTH EVERY 8 HOURS AS NEEDED, Disp-90 tablet, R-5Normal      pravastatin (PRAVACHOL) 40 MG tablet Take 1 tablet by mouth nightly, Disp-30 tablet, R-3Normal      QUEtiapine (SEROQUEL) 50 MG tablet Take 1 tablet by mouth every evening, Disp-30 tablet, R-5Normal      B Complex-C-Folic Acid (VIRT-CAPS) 1 MG CAPS TK ONE C PO  QD, Disp-90 capsule,R-1Normal insulin glargine (BASAGLAR KWIKPEN) 100 UNIT/ML injection pen Inject 70 Units into the skin nightly, Disp-15 mL,R-5Normal      insulin aspart (NOVOLOG FLEXPEN) 100 UNIT/ML injection pen Inject 20 Units into the skin 3 times daily (before meals), Disp-15 pen,R-5Normal      vitamin D (ERGOCALCIFEROL) 30868 units CAPS capsule TK 1 C PO WEEKLY, R-11Historical Med      albuterol (PROVENTIL) (2.5 MG/3ML) 0.083% nebulizer solution INHALE 1 VIAL VIA NEBULIZER EVERY 6 HOURS AS NEEDED FOR WHEEZING, Disp-300 mL, R-5Normal      nystatin (MYCOSTATIN) 067741 UNIT/GM cream Apply topically 2 times daily. , Disp-60 g, R-3, Normal      hydrOXYzine (VISTARIL) 50 MG capsule TAKE 1 TO 2 CAPSULES BY MOUTH NIGHTLY, Disp-60 capsule, R-5Normal      gabapentin (NEURONTIN) 100 MG capsule TAKE 1 TO 2 CAPSULES BY MOUTH THREE TIMES A DAY, Disp-180 capsule, R-5Normal      LINZESS 145 MCG capsule TAKE 1 CAPSULE BY MOUTH EVERY MORNING BEFORE BREAKFAST, Disp-30 capsule, R-10Normal      traZODone (DESYREL) 150 MG tablet TAKE (1) TABLET BY MOUTH NIGHTLY, Disp-30 tablet, R-10Normal      predniSONE (DELTASONE) 20 MG tablet Take 3 tablets daily for 3 days, 2 tablets daily for 3 days and 1 tablet daily for 3 days. , Disp-18 tablet, R-0Normal      dilTIAZem (CARDIZEM CD) 180 MG extended release capsule Historical Med      DULoxetine (CYMBALTA) 30 MG extended release capsule TAKE 1 CAPSULE BY MOUTH EVERY DAY, Disp-30 capsule, R-10Normal      tiZANidine (ZANAFLEX) 4 MG tablet TAKE 1 TABLET BY MOUTH THREE TIMES DAILY, Disp-90 tablet, R-10Normal      losartan (COZAAR) 50 MG tablet TAKE 1 TABLET BY MOUTH DAILY, Disp-30 tablet, R-10Normal      pantoprazole (PROTONIX) 40 MG tablet TAKE (1) TABLET BY MOUTH EACH MORNING BEFORE BREAKFAST, Disp-90 tablet, R-1Normal      simethicone (MYLICON) 80 MG chewable tablet Take 1 tablet by mouth every 6 hours as needed (cramping), Disp-15 tablet, R-0Normal      !! glucose monitoring kit (FREESTYLE) monitoring kit DAILY Starting Fri 1/8/2021, Disp-1 kit, R-0, Normal      !! blood glucose test strips (GLUCOSE METER TEST) strip 1 each by In Vitro route 5 times daily As needed. , Disp-100 each, R-3Normal      nitroGLYCERIN (NITROSTAT) 0.4 MG SL tablet DISSOLVE 1 TABLET UNDER THE TONGUE AS NEEDED FOR CHEST PAIN EVERY 5 MINUTES UP TO 3 TIMES. IF NO RELIEF CALL 911., Disp-25 tablet, R-10Normal      citalopram (CELEXA) 40 MG tablet TAKE (1) TABLET BY MOUTH DAILY, Disp-30 tablet,R-10Normal      isosorbide mononitrate (IMDUR) 30 MG extended release tablet TAKE 1 TABLET BY MOUTH EVERY DAY, Disp-90 tablet,R-10Normal      ondansetron (ZOFRAN ODT) 4 MG disintegrating tablet Take 1 tablet by mouth every 8 hours as needed for Nausea, Disp-60 tablet,R-0Normal      Calcium Acetate, Phos Binder, 667 MG CAPS TAKE 1 CAPSULE BY MOUTH THREE TIMES DAILY WITH MEALS, Disp-90 capsule, R-3Normal      !! blood glucose test strips (FREESTYLE LITE) strip Disp-100 strip, R-3, NormalDaily As needed. !! glucose monitoring kit (FREESTYLE) monitoring kit DAILY Starting Mon 8/19/2019, Disp-1 kit, R-0, Normal      flunisolide (NASALIDE) 25 MCG/ACT (0.025%) SOLN Inhale 2 sprays into the lungs every 12 hours, Disp-1 Bottle, R-5Normal      Tiotropium Bromide-Olodaterol (STIOLTO RESPIMAT) 2.5-2.5 MCG/ACT AERS Inhale 2 puffs into the lungs daily, Disp-2 Inhaler, R-02 samples given:  Lot #[id number], Exp 7/21 & Lot #[id number], Exp 7/21NO PRINT      Polyethylene Glycol 3350 GRAN Starting Wed 5/2/2018, Historical Med      !! Glucose Blood (BLOOD GLUCOSE TEST STRIPS) STRP TEST 3-4 TIMES DAILY, AS DIRECTED, Disp-100 strip, R-3Normal      Blood Glucose Monitoring Suppl ADAM Disp-1 Device, R-0, NormalUSE AS DIRECTED.       Alcohol Swabs PADS Disp-300 each, R-3, NormalUSE AS DIRECTED      albuterol sulfate  (90 Base) MCG/ACT inhaler Inhale 2 puffs into the lungs every 6 hours as needed for Wheezing, Disp-1 Inhaler, R-3Print      ipratropium-albuterol (DUONEB) 0.5-2.5 (3) MG/3ML SOLN nebulizer solution Inhale 3 mLs into the lungs every 6 hours as needed for Shortness of Breath, Disp-360 mL, R-1Print      calcium carbonate (TUMS) 500 MG chewable tablet Take 1 tablet by mouth 3 times daily as needed for Heartburn. aspirin 81 MG chewable tablet Take 1 tablet by mouth daily. , Disp-30 tablet, R-2       !! - Potential duplicate medications found. Please discuss with provider. Time Spent on discharge is more than 30 minutes in the examination, evaluation, counseling and review of medications and discharge plan. Signed:    Ovidio Goodwin MD   6/29/2021      Thank you Arya Tobin MD for the opportunity to be involved in this patient's care. If you have any questions or concerns please feel free to contact me at [phone].

## 2021-06-29 NOTE — PROGRESS NOTES
Progress Note    HISTORY     CC:   Altered Mental Status            We are following for ESRD       Subjective/   HPI:  Sleepy but improving. More clear. Did not have any problems with dialysis.   Post weight down to 116 kg    ROS:  Constitutional:  No fevers, No Chills, + weakness  Cardiovascular:  No palpations, no edema  Respiratory:  No wheezing, no cough  Skin:  No rash, no itching  :  Not making much urine     Social Hx:  No family at bedside     Past Medical and Surgical History:  - Reviewed, no changes     EXAM       Objective/     Vitals:    06/29/21 0301 06/29/21 0600 06/29/21 0745 06/29/21 1119   BP:  123/63 120/63 (!) 110/55   Pulse:  83 82 89   Resp:  17 16 18   Temp:  97.6 °F (36.4 °C) 98.1 °F (36.7 °C) 97.6 °F (36.4 °C)   TempSrc:  Oral Axillary Oral   SpO2:  93% 95% 96%   Weight: 255 lb 9.6 oz (115.9 kg)      Height:         24HR INTAKE/OUTPUT:      Intake/Output Summary (Last 24 hours) at 6/29/2021 1636  Last data filed at 6/29/2021 0006  Gross per 24 hour   Intake 0 ml   Output 300 ml   Net -300 ml     Constitutional:  Alert, awake, no apparent distress  Eyes:  Pupils reactive, sclera clear   Neck:  Normal thyroid, no masses   Cardiovascular:  Regular, no rub  Respiratory:  No distress, no wheezing  Psychiatry:  Flat mood/affect, alert  Abdomen: +bs, soft, nt, no masses   Musculoskeletal: No LE edema, no clubbing   Lymphatics:  No LAD in neck, no supraclavicular nodes   Access:  [facility]      MEDICAL DECISION MAKING       Data/  Recent Labs     06/27/21  0630 06/28/21  0641 06/29/21  0638   WBC 13.7* 12.3* 13.8*   HGB 11.6* 11.6* 11.2*   HCT 33.3* 33.6* 32.7*   MCV 87.6 88.0 88.7    258 234     Recent Labs     06/27/21  0630 06/28/21  0641 06/29/21  0638   * 136 134*   K 3.5 3.2* 3.5   CL 98* 99 96*   CO2 22 23 25   GLUCOSE 105* 122* 106*   PHOS 3.0 4.1 4.1   MG 2.50* 2.60* 2.40   BUN 43* 29* 28*   CREATININE 6.3* 5.0* 4.8*   LABGLOM 9* 12* 13*   GFRAA 11* 15* 15* Assessment/     End stage renal disease - on HD Mon-Wed-Fri     Acute metabolic encephalopathy - multi-factorial    Improving with better glucose readings and dialysis      - Hyperglycemia     - Hyponatremia - secondary to translocation from hyperglycemia, better with blood sugar correction     - Hypertension - poorly controlled     - Anemia of chronic disease - Hb 11.6    Plan/     Ok for discharge planning  Target is 119 kg, with back to back treatments he is down to 116 kg  -----------------------------  Tanya Ewing M.D.   [facility]

## 2021-06-29 NOTE — PLAN OF CARE
Problem: Falls - Risk of:  Goal: Will remain free from falls  Description: Will remain free from falls  Outcome: Ongoing     Problem: Skin Integrity:  Goal: Absence of new skin breakdown  Description: Absence of new skin breakdown  Outcome: Ongoing     Problem: Pain:  Goal: Control of chronic pain  Description: Control of chronic pain  Outcome: Ongoing

## 2021-06-29 NOTE — PROGRESS NOTES
Pt d/c'd home with [facility]. Removed peripheral IV and stopped bleeding. Catheter intact. Pt tolerated well. No redness noted at site. Notified [facility] and removed tele box. Reviewed d/c instructions, home meds, and  f/u information utilizing teach-back method. Patient verbalized understanding. Pt escorted off of unit with all d/c paperwork and belongings in hand.

## 2021-06-29 NOTE — CARE COORDINATION
[facility]    Referral received from  to follow for home care services. Patient marked do not readmit for [facility] accepted referral; spoke scottie Dubon she will pull referral.  Left patient a voicemail to call me regarding home care services. Received spouse's voicemail too.     Bertram Anthony RN, BSN CTN  [facility] [phone]

## 2021-07-01 LAB
BLOOD CULTURE, ROUTINE: NORMAL
CULTURE, BLOOD 2: NORMAL

## 2021-07-09 DIAGNOSIS — L03.90 CELLULITIS, UNSPECIFIED CELLULITIS SITE: ICD-10-CM

## 2021-07-09 RX ORDER — OXYCODONE AND ACETAMINOPHEN 7.5; 325 MG/1; MG/1
1 TABLET ORAL
Qty: 150 TABLET | Refills: 0 | OUTPATIENT
Start: 2021-07-09 | End: 2021-08-08

## 2021-07-09 NOTE — TELEPHONE ENCOUNTER
Last Office Visit  -  6/22/21  Next Office Visit  -  none    Last Filled  -  6/22/21  Last UDS -  6/26/21  Contract -  none

## 2021-07-09 NOTE — TELEPHONE ENCOUNTER
Refill is too soon. If patient has fallen and is injured he needs to be seen at the ER and evaluated.   I would not refill the medication early he can follow-up with Dr. Jhonny Mac on Monday regarding refill

## 2021-07-22 ENCOUNTER — TELEPHONE (OUTPATIENT)
Dept: CARDIOLOGY CLINIC | Age: 51
End: 2021-07-22

## 2021-07-22 DIAGNOSIS — L03.90 CELLULITIS, UNSPECIFIED CELLULITIS SITE: ICD-10-CM

## 2021-07-22 RX ORDER — OXYCODONE AND ACETAMINOPHEN 7.5; 325 MG/1; MG/1
1 TABLET ORAL
Qty: 150 TABLET | Refills: 0 | Status: SHIPPED | OUTPATIENT
Start: 2021-07-22 | End: 2021-08-03 | Stop reason: SDUPTHER

## 2021-07-22 NOTE — TELEPHONE ENCOUNTER
Last seen 6/22/21   Future no  oxyCODONE-acetaminophen (PERCOCET) 7.5-325 MG per tablet    Pharmacy [id number]

## 2021-07-23 NOTE — TELEPHONE ENCOUNTER
07/23-Called ([phone]) unable to make contact, LM for pt to contact I in order to schedule an office visit. Also left central scheduling number for pt to schedule his echo. Central Scheduling number is [phone].

## 2021-07-27 ENCOUNTER — APPOINTMENT (OUTPATIENT)
Dept: GENERAL RADIOLOGY | Age: 51
DRG: 640 | End: 2021-07-27
Payer: COMMERCIAL

## 2021-07-27 ENCOUNTER — HOSPITAL ENCOUNTER (INPATIENT)
Age: 51
LOS: 3 days | Discharge: HOME | DRG: 640 | End: 2021-07-30
Attending: EMERGENCY MEDICINE | Admitting: INTERNAL MEDICINE
Payer: COMMERCIAL

## 2021-07-27 DIAGNOSIS — J96.01 ACUTE RESPIRATORY FAILURE WITH HYPOXIA (HCC): Primary | ICD-10-CM

## 2021-07-27 DIAGNOSIS — R77.8 ELEVATED TROPONIN: ICD-10-CM

## 2021-07-27 DIAGNOSIS — E87.70 HYPERVOLEMIA, UNSPECIFIED HYPERVOLEMIA TYPE: ICD-10-CM

## 2021-07-27 LAB
A/G RATIO: 1 (ref 1.1–2.2)
A/G RATIO: 1.1 (ref 1.1–2.2)
ALBUMIN SERPL-MCNC: 3.3 G/DL (ref 3.4–5)
ALBUMIN SERPL-MCNC: 3.4 G/DL (ref 3.4–5)
ALP BLD-CCNC: 109 U/L (ref 40–129)
ALP BLD-CCNC: 114 U/L (ref 40–129)
ALT SERPL-CCNC: 10 U/L (ref 10–40)
ALT SERPL-CCNC: 11 U/L (ref 10–40)
ANION GAP SERPL CALCULATED.3IONS-SCNC: 14 MMOL/L (ref 3–16)
ANION GAP SERPL CALCULATED.3IONS-SCNC: 15 MMOL/L (ref 3–16)
APTT: 27.3 SEC (ref 26.2–38.6)
AST SERPL-CCNC: 12 U/L (ref 15–37)
AST SERPL-CCNC: 12 U/L (ref 15–37)
BASE EXCESS VENOUS: -3.1 MMOL/L (ref -3–3)
BASOPHILS ABSOLUTE: 0.1 K/UL (ref 0–0.2)
BASOPHILS ABSOLUTE: 0.1 K/UL (ref 0–0.2)
BASOPHILS RELATIVE PERCENT: 0.7 %
BASOPHILS RELATIVE PERCENT: 0.7 %
BILIRUB SERPL-MCNC: <0.2 MG/DL (ref 0–1)
BILIRUB SERPL-MCNC: <0.2 MG/DL (ref 0–1)
BUN BLDV-MCNC: 44 MG/DL (ref 7–20)
BUN BLDV-MCNC: 45 MG/DL (ref 7–20)
CALCIUM SERPL-MCNC: 9.2 MG/DL (ref 8.3–10.6)
CALCIUM SERPL-MCNC: 9.5 MG/DL (ref 8.3–10.6)
CARBOXYHEMOGLOBIN: 4.8 % (ref 0–1.5)
CHLORIDE BLD-SCNC: 101 MMOL/L (ref 99–110)
CHLORIDE BLD-SCNC: 98 MMOL/L (ref 99–110)
CO2: 22 MMOL/L (ref 21–32)
CO2: 23 MMOL/L (ref 21–32)
CREAT SERPL-MCNC: 6.1 MG/DL (ref 0.9–1.3)
CREAT SERPL-MCNC: 6.3 MG/DL (ref 0.9–1.3)
EKG ATRIAL RATE: 104 BPM
EKG DIAGNOSIS: NORMAL
EKG P AXIS: 74 DEGREES
EKG P-R INTERVAL: 168 MS
EKG Q-T INTERVAL: 346 MS
EKG QRS DURATION: 92 MS
EKG QTC CALCULATION (BAZETT): 454 MS
EKG R AXIS: 16 DEGREES
EKG T AXIS: 131 DEGREES
EKG VENTRICULAR RATE: 104 BPM
EOSINOPHILS ABSOLUTE: 0.1 K/UL (ref 0–0.6)
EOSINOPHILS ABSOLUTE: 0.5 K/UL (ref 0–0.6)
EOSINOPHILS RELATIVE PERCENT: 1.1 %
EOSINOPHILS RELATIVE PERCENT: 3.4 %
ESTIMATED AVERAGE GLUCOSE: 240.3 MG/DL
GFR AFRICAN AMERICAN: 11
GFR AFRICAN AMERICAN: 12
GFR NON-AFRICAN AMERICAN: 10
GFR NON-AFRICAN AMERICAN: 9
GLOBULIN: 3 G/DL
GLOBULIN: 3.5 G/DL
GLUCOSE BLD-MCNC: 229 MG/DL (ref 70–99)
GLUCOSE BLD-MCNC: 261 MG/DL (ref 70–99)
GLUCOSE BLD-MCNC: 292 MG/DL (ref 70–99)
GLUCOSE BLD-MCNC: 304 MG/DL (ref 70–99)
GLUCOSE BLD-MCNC: 311 MG/DL (ref 70–99)
HBA1C MFR BLD: 10 %
HCO3 VENOUS: 23.6 MMOL/L (ref 23–29)
HCT VFR BLD CALC: 32 % (ref 40.5–52.5)
HCT VFR BLD CALC: 34.8 % (ref 40.5–52.5)
HEMOGLOBIN: 10.7 G/DL (ref 13.5–17.5)
HEMOGLOBIN: 11.6 G/DL (ref 13.5–17.5)
INR BLD: 0.95 (ref 0.88–1.12)
LYMPHOCYTES ABSOLUTE: 0.7 K/UL (ref 1–5.1)
LYMPHOCYTES ABSOLUTE: 1.2 K/UL (ref 1–5.1)
LYMPHOCYTES RELATIVE PERCENT: 5.4 %
LYMPHOCYTES RELATIVE PERCENT: 7.5 %
MCH RBC QN AUTO: 30.4 PG (ref 26–34)
MCH RBC QN AUTO: 30.7 PG (ref 26–34)
MCHC RBC AUTO-ENTMCNC: 33.4 G/DL (ref 31–36)
MCHC RBC AUTO-ENTMCNC: 33.4 G/DL (ref 31–36)
MCV RBC AUTO: 90.8 FL (ref 80–100)
MCV RBC AUTO: 91.9 FL (ref 80–100)
METHEMOGLOBIN VENOUS: 0.2 %
MONOCYTES ABSOLUTE: 0.7 K/UL (ref 0–1.3)
MONOCYTES ABSOLUTE: 0.8 K/UL (ref 0–1.3)
MONOCYTES RELATIVE PERCENT: 5.3 %
MONOCYTES RELATIVE PERCENT: 5.4 %
NEUTROPHILS ABSOLUTE: 10.8 K/UL (ref 1.7–7.7)
NEUTROPHILS ABSOLUTE: 13.2 K/UL (ref 1.7–7.7)
NEUTROPHILS RELATIVE PERCENT: 83.1 %
NEUTROPHILS RELATIVE PERCENT: 87.4 %
O2 SAT, VEN: 78 %
O2 THERAPY: ABNORMAL
PCO2, VEN: 49.1 MMHG (ref 40–50)
PDW BLD-RTO: 15.6 % (ref 12.4–15.4)
PDW BLD-RTO: 15.7 % (ref 12.4–15.4)
PERFORMED ON: ABNORMAL
PH VENOUS: 7.3 (ref 7.35–7.45)
PLATELET # BLD: 297 K/UL (ref 135–450)
PLATELET # BLD: 339 K/UL (ref 135–450)
PMV BLD AUTO: 7.9 FL (ref 5–10.5)
PMV BLD AUTO: 8.3 FL (ref 5–10.5)
PO2, VEN: 42.6 MMHG (ref 25–40)
POTASSIUM REFLEX MAGNESIUM: 4.5 MMOL/L (ref 3.5–5.1)
POTASSIUM REFLEX MAGNESIUM: 4.7 MMOL/L (ref 3.5–5.1)
PRO-BNP: ABNORMAL PG/ML (ref 0–124)
PROTHROMBIN TIME: 10.7 SEC (ref 9.9–12.7)
RBC # BLD: 3.53 M/UL (ref 4.2–5.9)
RBC # BLD: 3.78 M/UL (ref 4.2–5.9)
SODIUM BLD-SCNC: 135 MMOL/L (ref 136–145)
SODIUM BLD-SCNC: 138 MMOL/L (ref 136–145)
SPECIMEN STATUS: NORMAL
TCO2 CALC VENOUS: 25 MMOL/L
TOTAL PROTEIN: 6.3 G/DL (ref 6.4–8.2)
TOTAL PROTEIN: 6.9 G/DL (ref 6.4–8.2)
TROPONIN: 0.33 NG/ML
TROPONIN: 0.35 NG/ML
WBC # BLD: 12.4 K/UL (ref 4–11)
WBC # BLD: 15.8 K/UL (ref 4–11)

## 2021-07-27 PROCEDURE — 6370000000 HC RX 637 (ALT 250 FOR IP): Performed by: REGISTERED NURSE

## 2021-07-27 PROCEDURE — 2700000000 HC OXYGEN THERAPY PER DAY

## 2021-07-27 PROCEDURE — 71045 X-RAY EXAM CHEST 1 VIEW: CPT

## 2021-07-27 PROCEDURE — 82803 BLOOD GASES ANY COMBINATION: CPT

## 2021-07-27 PROCEDURE — 83036 HEMOGLOBIN GLYCOSYLATED A1C: CPT

## 2021-07-27 PROCEDURE — 6370000000 HC RX 637 (ALT 250 FOR IP): Performed by: EMERGENCY MEDICINE

## 2021-07-27 PROCEDURE — 83880 ASSAY OF NATRIURETIC PEPTIDE: CPT

## 2021-07-27 PROCEDURE — 5A1D70Z PERFORMANCE OF URINARY FILTRATION, INTERMITTENT, LESS THAN 6 HOURS PER DAY: ICD-10-PCS | Performed by: INTERNAL MEDICINE

## 2021-07-27 PROCEDURE — 96374 THER/PROPH/DIAG INJ IV PUSH: CPT

## 2021-07-27 PROCEDURE — 80053 COMPREHEN METABOLIC PANEL: CPT

## 2021-07-27 PROCEDURE — 36415 COLL VENOUS BLD VENIPUNCTURE: CPT

## 2021-07-27 PROCEDURE — 85610 PROTHROMBIN TIME: CPT

## 2021-07-27 PROCEDURE — 93010 ELECTROCARDIOGRAM REPORT: CPT | Performed by: INTERNAL MEDICINE

## 2021-07-27 PROCEDURE — 6360000002 HC RX W HCPCS: Performed by: INTERNAL MEDICINE

## 2021-07-27 PROCEDURE — 93005 ELECTROCARDIOGRAM TRACING: CPT | Performed by: EMERGENCY MEDICINE

## 2021-07-27 PROCEDURE — 94640 AIRWAY INHALATION TREATMENT: CPT

## 2021-07-27 PROCEDURE — 2580000003 HC RX 258: Performed by: INTERNAL MEDICINE

## 2021-07-27 PROCEDURE — 2060000000 HC ICU INTERMEDIATE R&B

## 2021-07-27 PROCEDURE — 85730 THROMBOPLASTIN TIME PARTIAL: CPT

## 2021-07-27 PROCEDURE — 94761 N-INVAS EAR/PLS OXIMETRY MLT: CPT

## 2021-07-27 PROCEDURE — 94660 CPAP INITIATION&MGMT: CPT

## 2021-07-27 PROCEDURE — 90935 HEMODIALYSIS ONE EVALUATION: CPT

## 2021-07-27 PROCEDURE — 99284 EMERGENCY DEPT VISIT MOD MDM: CPT

## 2021-07-27 PROCEDURE — 84484 ASSAY OF TROPONIN QUANT: CPT

## 2021-07-27 PROCEDURE — 85025 COMPLETE CBC W/AUTO DIFF WBC: CPT

## 2021-07-27 PROCEDURE — 6360000002 HC RX W HCPCS: Performed by: EMERGENCY MEDICINE

## 2021-07-27 PROCEDURE — 6370000000 HC RX 637 (ALT 250 FOR IP): Performed by: INTERNAL MEDICINE

## 2021-07-27 RX ORDER — MAGNESIUM SULFATE IN WATER 40 MG/ML
2000 INJECTION, SOLUTION INTRAVENOUS PRN
Status: DISCONTINUED | OUTPATIENT
Start: 2021-07-27 | End: 2021-07-27

## 2021-07-27 RX ORDER — DEXTROSE MONOHYDRATE 50 MG/ML
100 INJECTION, SOLUTION INTRAVENOUS PRN
Status: DISCONTINUED | OUTPATIENT
Start: 2021-07-27 | End: 2021-07-30 | Stop reason: HOSPADM

## 2021-07-27 RX ORDER — SODIUM CHLORIDE 9 MG/ML
25 INJECTION, SOLUTION INTRAVENOUS PRN
Status: DISCONTINUED | OUTPATIENT
Start: 2021-07-27 | End: 2021-07-30 | Stop reason: HOSPADM

## 2021-07-27 RX ORDER — IPRATROPIUM BROMIDE AND ALBUTEROL SULFATE 2.5; .5 MG/3ML; MG/3ML
1 SOLUTION RESPIRATORY (INHALATION)
Status: DISCONTINUED | OUTPATIENT
Start: 2021-07-27 | End: 2021-07-27

## 2021-07-27 RX ORDER — PANTOPRAZOLE SODIUM 40 MG/1
40 TABLET, DELAYED RELEASE ORAL
Status: DISCONTINUED | OUTPATIENT
Start: 2021-07-28 | End: 2021-07-30 | Stop reason: HOSPADM

## 2021-07-27 RX ORDER — TRAZODONE HYDROCHLORIDE 50 MG/1
150 TABLET ORAL ONCE
Status: COMPLETED | OUTPATIENT
Start: 2021-07-28 | End: 2021-07-28

## 2021-07-27 RX ORDER — SODIUM CHLORIDE 0.9 % (FLUSH) 0.9 %
10 SYRINGE (ML) INJECTION PRN
Status: DISCONTINUED | OUTPATIENT
Start: 2021-07-27 | End: 2021-07-30 | Stop reason: HOSPADM

## 2021-07-27 RX ORDER — PRAVASTATIN SODIUM 40 MG
40 TABLET ORAL NIGHTLY
Status: DISCONTINUED | OUTPATIENT
Start: 2021-07-27 | End: 2021-07-30 | Stop reason: HOSPADM

## 2021-07-27 RX ORDER — IPRATROPIUM BROMIDE AND ALBUTEROL SULFATE 2.5; .5 MG/3ML; MG/3ML
1 SOLUTION RESPIRATORY (INHALATION) EVERY 4 HOURS PRN
Status: DISCONTINUED | OUTPATIENT
Start: 2021-07-27 | End: 2021-07-30 | Stop reason: HOSPADM

## 2021-07-27 RX ORDER — ACETAMINOPHEN 650 MG/1
650 SUPPOSITORY RECTAL EVERY 6 HOURS PRN
Status: DISCONTINUED | OUTPATIENT
Start: 2021-07-27 | End: 2021-07-30 | Stop reason: HOSPADM

## 2021-07-27 RX ORDER — PROMETHAZINE HYDROCHLORIDE 25 MG/1
12.5 TABLET ORAL EVERY 6 HOURS PRN
Status: DISCONTINUED | OUTPATIENT
Start: 2021-07-27 | End: 2021-07-30 | Stop reason: HOSPADM

## 2021-07-27 RX ORDER — FUROSEMIDE 10 MG/ML
80 INJECTION INTRAMUSCULAR; INTRAVENOUS ONCE
Status: COMPLETED | OUTPATIENT
Start: 2021-07-27 | End: 2021-07-27

## 2021-07-27 RX ORDER — HEPARIN SODIUM 1000 [USP'U]/ML
3600 INJECTION, SOLUTION INTRAVENOUS; SUBCUTANEOUS PRN
Status: DISCONTINUED | OUTPATIENT
Start: 2021-07-27 | End: 2021-07-30 | Stop reason: HOSPADM

## 2021-07-27 RX ORDER — LOSARTAN POTASSIUM 25 MG/1
50 TABLET ORAL DAILY
Status: DISCONTINUED | OUTPATIENT
Start: 2021-07-28 | End: 2021-07-30 | Stop reason: HOSPADM

## 2021-07-27 RX ORDER — ISOSORBIDE MONONITRATE 30 MG/1
30 TABLET, EXTENDED RELEASE ORAL DAILY
Status: DISCONTINUED | OUTPATIENT
Start: 2021-07-28 | End: 2021-07-30 | Stop reason: HOSPADM

## 2021-07-27 RX ORDER — DULOXETIN HYDROCHLORIDE 30 MG/1
30 CAPSULE, DELAYED RELEASE ORAL DAILY
Status: DISCONTINUED | OUTPATIENT
Start: 2021-07-28 | End: 2021-07-27

## 2021-07-27 RX ORDER — NICOTINE POLACRILEX 4 MG
15 LOZENGE BUCCAL PRN
Status: DISCONTINUED | OUTPATIENT
Start: 2021-07-27 | End: 2021-07-30 | Stop reason: HOSPADM

## 2021-07-27 RX ORDER — OXYCODONE HYDROCHLORIDE 5 MG/1
10 TABLET ORAL EVERY 4 HOURS PRN
Status: DISCONTINUED | OUTPATIENT
Start: 2021-07-27 | End: 2021-07-27

## 2021-07-27 RX ORDER — ONDANSETRON 2 MG/ML
4 INJECTION INTRAMUSCULAR; INTRAVENOUS EVERY 6 HOURS PRN
Status: DISCONTINUED | OUTPATIENT
Start: 2021-07-27 | End: 2021-07-30 | Stop reason: HOSPADM

## 2021-07-27 RX ORDER — DOXERCALCIFEROL 2 UG/ML
2 INJECTION, SOLUTION INTRAVENOUS
Status: DISCONTINUED | OUTPATIENT
Start: 2021-07-28 | End: 2021-07-30 | Stop reason: HOSPADM

## 2021-07-27 RX ORDER — HEPARIN SODIUM 1000 [USP'U]/ML
4100 INJECTION, SOLUTION INTRAVENOUS; SUBCUTANEOUS PRN
Status: DISCONTINUED | OUTPATIENT
Start: 2021-07-27 | End: 2021-07-30 | Stop reason: HOSPADM

## 2021-07-27 RX ORDER — OXYCODONE HYDROCHLORIDE 5 MG/1
5 TABLET ORAL EVERY 4 HOURS PRN
Status: DISCONTINUED | OUTPATIENT
Start: 2021-07-27 | End: 2021-07-27

## 2021-07-27 RX ORDER — POTASSIUM CHLORIDE 7.45 MG/ML
10 INJECTION INTRAVENOUS PRN
Status: DISCONTINUED | OUTPATIENT
Start: 2021-07-27 | End: 2021-07-27

## 2021-07-27 RX ORDER — HEPARIN SODIUM 5000 [USP'U]/ML
5000 INJECTION, SOLUTION INTRAVENOUS; SUBCUTANEOUS EVERY 8 HOURS SCHEDULED
Status: DISCONTINUED | OUTPATIENT
Start: 2021-07-27 | End: 2021-07-30 | Stop reason: HOSPADM

## 2021-07-27 RX ORDER — GABAPENTIN 100 MG/1
100 CAPSULE ORAL 3 TIMES DAILY
Status: DISCONTINUED | OUTPATIENT
Start: 2021-07-27 | End: 2021-07-30 | Stop reason: HOSPADM

## 2021-07-27 RX ORDER — ASPIRIN 325 MG
325 TABLET ORAL ONCE
Status: COMPLETED | OUTPATIENT
Start: 2021-07-27 | End: 2021-07-27

## 2021-07-27 RX ORDER — SODIUM CHLORIDE 0.9 % (FLUSH) 0.9 %
10 SYRINGE (ML) INJECTION EVERY 12 HOURS SCHEDULED
Status: DISCONTINUED | OUTPATIENT
Start: 2021-07-27 | End: 2021-07-30 | Stop reason: HOSPADM

## 2021-07-27 RX ORDER — DEXTROSE MONOHYDRATE 25 G/50ML
12.5 INJECTION, SOLUTION INTRAVENOUS PRN
Status: DISCONTINUED | OUTPATIENT
Start: 2021-07-27 | End: 2021-07-30 | Stop reason: HOSPADM

## 2021-07-27 RX ORDER — INSULIN GLARGINE 100 [IU]/ML
35 INJECTION, SOLUTION SUBCUTANEOUS DAILY
Status: DISCONTINUED | OUTPATIENT
Start: 2021-07-27 | End: 2021-07-29

## 2021-07-27 RX ORDER — OXYCODONE HYDROCHLORIDE AND ACETAMINOPHEN 5; 325 MG/1; MG/1
1 TABLET ORAL EVERY 4 HOURS PRN
Status: DISCONTINUED | OUTPATIENT
Start: 2021-07-27 | End: 2021-07-30 | Stop reason: HOSPADM

## 2021-07-27 RX ORDER — ASPIRIN 81 MG/1
81 TABLET, CHEWABLE ORAL DAILY
Status: DISCONTINUED | OUTPATIENT
Start: 2021-07-27 | End: 2021-07-30 | Stop reason: HOSPADM

## 2021-07-27 RX ORDER — ALBUTEROL SULFATE 90 UG/1
2 AEROSOL, METERED RESPIRATORY (INHALATION) EVERY 6 HOURS PRN
Status: DISCONTINUED | OUTPATIENT
Start: 2021-07-27 | End: 2021-07-30 | Stop reason: HOSPADM

## 2021-07-27 RX ORDER — OXYCODONE HYDROCHLORIDE AND ACETAMINOPHEN 5; 325 MG/1; MG/1
1 TABLET ORAL ONCE
Status: COMPLETED | OUTPATIENT
Start: 2021-07-27 | End: 2021-07-27

## 2021-07-27 RX ORDER — QUETIAPINE FUMARATE 25 MG/1
50 TABLET, FILM COATED ORAL NIGHTLY
Status: DISCONTINUED | OUTPATIENT
Start: 2021-07-27 | End: 2021-07-30 | Stop reason: HOSPADM

## 2021-07-27 RX ORDER — ACETAMINOPHEN 325 MG/1
650 TABLET ORAL EVERY 6 HOURS PRN
Status: DISCONTINUED | OUTPATIENT
Start: 2021-07-27 | End: 2021-07-30 | Stop reason: HOSPADM

## 2021-07-27 RX ADMIN — ALTEPLASE 4 MG: 2.2 INJECTION, POWDER, LYOPHILIZED, FOR SOLUTION INTRAVENOUS at 09:45

## 2021-07-27 RX ADMIN — QUETIAPINE FUMARATE 50 MG: 25 TABLET ORAL at 20:58

## 2021-07-27 RX ADMIN — INSULIN LISPRO 8 UNITS: 100 INJECTION, SOLUTION INTRAVENOUS; SUBCUTANEOUS at 05:54

## 2021-07-27 RX ADMIN — NITROGLYCERIN 1 INCH: 20 OINTMENT TOPICAL at 02:10

## 2021-07-27 RX ADMIN — FUROSEMIDE 80 MG: 10 INJECTION, SOLUTION INTRAMUSCULAR; INTRAVENOUS at 03:51

## 2021-07-27 RX ADMIN — OXYCODONE AND ACETAMINOPHEN 1 TABLET: 5; 325 TABLET ORAL at 14:34

## 2021-07-27 RX ADMIN — OXYCODONE 10 MG: 5 TABLET ORAL at 10:22

## 2021-07-27 RX ADMIN — OXYCODONE AND ACETAMINOPHEN 1 TABLET: 5; 325 TABLET ORAL at 23:31

## 2021-07-27 RX ADMIN — IPRATROPIUM BROMIDE AND ALBUTEROL SULFATE 1 AMPULE: .5; 3 SOLUTION RESPIRATORY (INHALATION) at 13:10

## 2021-07-27 RX ADMIN — HEPARIN SODIUM 5000 UNITS: 5000 INJECTION INTRAVENOUS; SUBCUTANEOUS at 20:58

## 2021-07-27 RX ADMIN — ASPIRIN 325 MG: 325 TABLET ORAL at 03:37

## 2021-07-27 RX ADMIN — INSULIN LISPRO 6 UNITS: 100 INJECTION, SOLUTION INTRAVENOUS; SUBCUTANEOUS at 17:50

## 2021-07-27 RX ADMIN — HEPARIN SODIUM 5000 UNITS: 5000 INJECTION INTRAVENOUS; SUBCUTANEOUS at 14:51

## 2021-07-27 RX ADMIN — Medication 2 PUFF: at 11:38

## 2021-07-27 RX ADMIN — Medication 2 PUFF: at 19:50

## 2021-07-27 RX ADMIN — ASPIRIN 81 MG: 81 TABLET, CHEWABLE ORAL at 17:49

## 2021-07-27 RX ADMIN — OXYCODONE HYDROCHLORIDE AND ACETAMINOPHEN 1 TABLET: 5; 325 TABLET ORAL at 02:10

## 2021-07-27 RX ADMIN — GABAPENTIN 100 MG: 100 CAPSULE ORAL at 20:58

## 2021-07-27 RX ADMIN — SODIUM CHLORIDE, PRESERVATIVE FREE 10 ML: 5 INJECTION INTRAVENOUS at 14:42

## 2021-07-27 RX ADMIN — OXYCODONE AND ACETAMINOPHEN 1 TABLET: 5; 325 TABLET ORAL at 19:03

## 2021-07-27 RX ADMIN — OXYCODONE 10 MG: 5 TABLET ORAL at 05:41

## 2021-07-27 RX ADMIN — INSULIN GLARGINE 35 UNITS: 100 INJECTION, SOLUTION SUBCUTANEOUS at 06:18

## 2021-07-27 RX ADMIN — SODIUM CHLORIDE, PRESERVATIVE FREE 10 ML: 5 INJECTION INTRAVENOUS at 21:06

## 2021-07-27 RX ADMIN — INSULIN LISPRO 2 UNITS: 100 INJECTION, SOLUTION INTRAVENOUS; SUBCUTANEOUS at 21:00

## 2021-07-27 RX ADMIN — PRAVASTATIN SODIUM 40 MG: 40 TABLET ORAL at 20:58

## 2021-07-27 RX ADMIN — HEPARIN SODIUM 5000 UNITS: 5000 INJECTION INTRAVENOUS; SUBCUTANEOUS at 05:41

## 2021-07-27 ASSESSMENT — PAIN DESCRIPTION - LOCATION
LOCATION: BACK;HIP
LOCATION: BACK

## 2021-07-27 ASSESSMENT — PAIN DESCRIPTION - PAIN TYPE
TYPE: CHRONIC PAIN

## 2021-07-27 ASSESSMENT — PAIN SCALES - GENERAL
PAINLEVEL_OUTOF10: 10
PAINLEVEL_OUTOF10: 8
PAINLEVEL_OUTOF10: 9
PAINLEVEL_OUTOF10: 10
PAINLEVEL_OUTOF10: 0
PAINLEVEL_OUTOF10: 7
PAINLEVEL_OUTOF10: 8
PAINLEVEL_OUTOF10: 9
PAINLEVEL_OUTOF10: 9

## 2021-07-27 ASSESSMENT — PAIN SCALES - WONG BAKER: WONGBAKER_NUMERICALRESPONSE: 4

## 2021-07-27 ASSESSMENT — PAIN DESCRIPTION - PROGRESSION: CLINICAL_PROGRESSION: NOT CHANGED

## 2021-07-27 ASSESSMENT — PAIN DESCRIPTION - ONSET: ONSET: ON-GOING

## 2021-07-27 NOTE — H&P
Hospital Medicine History & Physical      PCP: Maritza Vicente MD    Date of Admission: 7/27/2021    Date of Service: Pt seen/examined on 7/27/2021 and Admitted to Inpatient with expected LOS greater than two midnights due to medical therapy. Chief Complaint:  SOB    History Of Present Illness:      46 y.o. male with multiple co-morbidities who presented to [facility] with complaints of SOB that started yesterday. Pt skipped his dialysis yesterday. He reports BLE swelling. No cough/sputum production. No chest pain. No N/V/D. Reportedly pt had low O2 sats in the 70-80s per EMS report. He was placed on BiPAP and transported to the hospital. He is currently on 3 LPM. He does not wear O2 at home. CXR with findings of pulmonary vascular congestion. Nephrology consulted. Pt is currently undergoing HD when seen.      Past Medical History:          Diagnosis Date    Ambulatory dysfunction     walker for long distances, SOB with distance    Aortic stenosis     echo 2017    Arthritis     hands and hips    Asthma     Bilateral hilar adenopathy syndrome 6/3/2013    CAD (coronary artery disease)     Dr. Viky Nickerson [facility]) 04/19/2019    EF= 43%    CHF (congestive heart failure) (HCC)     Chronic pain     COPD (chronic obstructive pulmonary disease) ([address]     pulmonology Dr. Kristine Harper    Depression     Diabetes mellitus ([address]     borderline    Difficult intravenous access     Emphysema of lung ([address]     ESRD (end stage renal disease) on dialysis ([address]     MWF    Fear of needles     Gastric ulcer     GERD (gastroesophageal reflux disease)     Heart valve problem     bicuspic valve    Hemodialysis patient ([address]     History of spinal fracture     work incident    Hx of blood clots     Bilateral lower extremities; stents in place    Hyperlipidemia     Hypertension     MI (myocardial infarction) ([address] 2019    has had 9 MIs. 2019 was the last    Neuromuscular disorder ([address] due to CVA    Numbness and tingling in left arm     from fistula    Pneumonia     PONV (postoperative nausea and vomiting)     Prolonged emergence from general anesthesia     States requires more medication than most people    Sleep apnea     Uses CPAP    Stroke ([facility]     7mm thalamic cva 2017 deficts left side, left side weakness    TIA (transient ischemic attack)     Unspecified diseases of blood and blood-forming organs        Past Surgical History:          Procedure Laterality Date    AORTIC VALVE REPLACEMENT N/A 10/15/2019    TRANSCATHETER AORTIC VALVE REPLACEMENT FEMORAL APPROACH performed by Jaky Moya MD at [address] Right 7/2/2019    PERITONEAL DIALYSIS CATHETER REMOVAL performed by lAonso Layne MD at [facility] COLONOSCOPY  2/29/2015    WNL    CORONARY ANGIOPLASTY WITH STENT PLACEMENT  05/26/15    CYST REMOVAL  08/14/2013    EXCISION CYSTS, NECK X2 AND ABDOMINAL benign    DIAGNOSTIC CARDIAC CATH LAB PROCEDURE      DIALYSIS FISTULA CREATION Left 10/30/2017    LEFT BRACHIAL CEPHALIC FISTULA    DIALYSIS FISTULA CREATION Left 3/27/2019    LIGATION  AV FISTULA performed by Van Menon MD at [address], COLON, DIAGNOSTIC      OTHER SURGICAL HISTORY  02/01/2017    laparoscopic cholecystectomy with intraoperative cholangiogram    OTHER SURGICAL HISTORY  2018    PORT PLACEMENT  - vas cath    OTHER SURGICAL HISTORY Bilateral 06/26/2018    laprascopic peritoneal dialysis catheter placement    OTHER SURGICAL HISTORY Right 09/2018    peritoneal dialysis port placed on right side of abdomen    OTHER SURGICAL HISTORY  05/28/2019    PTA/Stenting R External Iliac artery    MO LAP INSERTION TUNNELED INTRAPERITONEAL CATHETER N/A 9/21/2018    LAPAROSCOPIC PERITONEAL DIALYSIS CATHETER REPLACEMENT performed by Alonso Layne MD at [facility]  01/06/2016    UPPER GASTROINTESTINAL ENDOSCOPY  01/29/2017    possible candida, otherwise normal appearing    VASCULAR SURGERY  aprx 2 years ago    2 stents placed, each side of groin       Medications Prior to Admission:      Prior to Admission medications    Medication Sig Start Date End Date Taking? Authorizing Provider   oxyCODONE-acetaminophen (PERCOCET) 7.5-325 MG per tablet Take 1 tablet by mouth every 4-6 hours as needed for Pain for up to 30 days.  7/22/21 8/21/21 Yes Alea Aguilar MD   hydrocortisone (ANUSOL-HC) 2.5 % CREA rectal cream Place rectally 2 times daily 6/9/21  Yes Alea Aguilar MD   albuterol (PROVENTIL) (2.5 MG/3ML) 0.083% nebulizer solution INHALE 1 VIAL VIA NEBULIZER EVERY 6 HOURS AS NEEDED FOR WHEEZING 6/2/21  Yes Alea Aguilar MD   famotidine (PEPCID) 20 MG tablet TAKE 1 TABLET BY MOUTH TWICE DAILY AS NEEDED FOR HEARTBURN 5/28/21  Yes Alea Aguilar MD   hydrOXYzine (VISTARIL) 50 MG capsule TAKE 1 TO 2 CAPSULES BY MOUTH NIGHTLY 5/26/21  Yes Alea Aguilar MD   LINZESS 145 MCG capsule TAKE 1 CAPSULE BY MOUTH EVERY MORNING BEFORE BREAKFAST 5/25/21  Yes Alea Aguilar MD   hydrALAZINE (APRESOLINE) 50 MG tablet TAKE 1/2 TABLET BY MOUTH EVERY 8 HOURS 5/24/21  Yes Alea Aguilar MD   traZODone (DESYREL) 150 MG tablet TAKE (1) TABLET BY MOUTH NIGHTLY 5/7/21  Yes JAY Robb CNP   dilTIAZem (CARDIZEM CD) 180 MG extended release capsule  4/15/21  Yes Historical Provider, MD   cyclobenzaprine (FLEXERIL) 10 MG tablet TAKE 1 TABLET BY MOUTH EVERY 8 HOURS AS NEEDED 5/3/21  Yes Alea Aguilar MD   DULoxetine (CYMBALTA) 30 MG extended release capsule TAKE 1 CAPSULE BY MOUTH EVERY DAY 4/27/21  Yes Alea Aguilar MD   tiZANidine (ZANAFLEX) 4 MG tablet TAKE 1 TABLET BY MOUTH THREE TIMES DAILY 4/27/21  Yes Alea Aguilar MD   pravastatin (PRAVACHOL) 40 MG tablet Take 1 tablet by mouth nightly 4/27/21  Yes Kim Ip, APRN - CNP   QUEtiapine (SEROQUEL) 50 MG tablet Take 1 tablet by mouth every evening 3/25/21  Yes Rafy Dover MD   losartan (COZAAR) 50 MG tablet TAKE 1 TABLET BY MOUTH DAILY 3/25/21  Yes Rafy Dover MD   pantoprazole (PROTONIX) 40 MG tablet TAKE (1) TABLET BY MOUTH EACH MORNING BEFORE BREAKFAST 3/9/21  Yes Rafy Dover MD   simethicone (MYLICON) 80 MG chewable tablet Take 1 tablet by mouth every 6 hours as needed (cramping) 2/3/21  Yes Leticia Yun MD   nitroGLYCERIN (NITROSTAT) 0.4 MG SL tablet DISSOLVE 1 TABLET UNDER THE TONGUE AS NEEDED FOR CHEST PAIN EVERY 5 MINUTES UP TO 3 TIMES.  IF NO RELIEF CALL 911. 1/7/21  Yes Rafy Dover MD   B Complex-C-Folic Acid (VIRT-CAPS) 1 MG CAPS TK ONE C PO  QD 11/18/20  Yes Rafy Dover MD   insulin glargine [facility]) 100 UNIT/ML injection pen Inject 70 Units into the skin nightly 11/10/20  Yes Rafy Dover MD   citalopram (CELEXA) 40 MG tablet TAKE (1) TABLET BY MOUTH DAILY 11/4/20  Yes Rafy Dover MD   insulin aspart (NOVOLOG FLEXPEN) 100 UNIT/ML injection pen Inject 20 Units into the skin 3 times daily (before meals) 10/12/20  Yes Rafy Dover MD   isosorbide mononitrate (IMDUR) 30 MG extended release tablet TAKE 1 TABLET BY MOUTH EVERY DAY 9/1/20  Yes Rafy Dover MD   ondansetron (ZOFRAN ODT) 4 MG disintegrating tablet Take 1 tablet by mouth every 8 hours as needed for Nausea 8/5/20  Yes Rafy Dover MD   Calcium Acetate, Phos Binder, 667 MG CAPS TAKE 1 CAPSULE BY MOUTH THREE TIMES DAILY WITH MEALS 6/29/20  Yes Rafy Dover MD   vitamin D (ERGOCALCIFEROL) 39304 units CAPS capsule TK 1 C PO WEEKLY 6/2/19  Yes Gerson Velazquez MD   flunisolide (NASALIDE) 25 MCG/ACT (0.025%) SOLN Inhale 2 sprays into the lungs every 12 hours 5/22/19  Yes Mel Davis MD   Tiotropium Bromide-Olodaterol (STIOLTO RESPIMAT) 2.5-2.5 MCG/ACT AERS Inhale 2 puffs into the lungs daily 5/21/19  Yes Mel Davis MD   Polyethylene Glycol 3350 GRAN  5/2/18  Yes Historical Provider, MD   albuterol sulfate  (90 Base) MCG/ACT inhaler Inhale 2 puffs into the lungs every 6 hours as needed for Wheezing 11/8/17  Yes Chris Stinson MD   ipratropium-albuterol (DUONEB) 0.5-2.5 (3) MG/3ML SOLN nebulizer solution Inhale 3 mLs into the lungs every 6 hours as needed for Shortness of Breath 10/15/17  Yes Terri Durham MD   calcium carbonate (TUMS) 500 MG chewable tablet Take 1 tablet by mouth 3 times daily as needed for Heartburn. Yes Gerson Velazquez MD   aspirin 81 MG chewable tablet Take 1 tablet by mouth daily. Patient taking differently: Take 81 mg by mouth daily Indications: stopped on 6/25 for surgery  5/14/13  Yes Bretta Sicard, MD   nystatin (MYCOSTATIN) 112920 UNIT/GM cream Apply topically 2 times daily. 5/28/21   Brandan Jain MD   gabapentin (NEURONTIN) 100 MG capsule TAKE 1 TO 2 CAPSULES BY MOUTH THREE TIMES A DAY 5/25/21 6/25/21  Brandan Jain MD   predniSONE (DELTASONE) 20 MG tablet Take 3 tablets daily for 3 days, 2 tablets daily for 3 days and 1 tablet daily for 3 days. 5/7/21   Luberta Osler, APRN - CNP   glucose monitoring kit (FREESTYLE) monitoring kit 1 kit by Does not apply route daily 1/8/21   Brandan Jain MD   blood glucose test strips (GLUCOSE METER TEST) strip 1 each by In Vitro route 5 times daily As needed. 1/8/21   Brandan Jain MD   blood glucose test strips (FREESTYLE LITE) strip Daily As needed. 8/19/19   Brandan Jain MD   glucose monitoring kit (FREESTYLE) monitoring kit 1 kit by Does not apply route daily 8/19/19   Brandan Jain MD   Glucose Blood (BLOOD GLUCOSE TEST STRIPS) STRP TEST 3-4 TIMES DAILY, AS DIRECTED 4/25/18   Blair Small MD   Blood Glucose Monitoring Suppl ADAM USE AS DIRECTED. 4/25/18   Blair Small MD   Alcohol Swabs PADS USE AS DIRECTED 4/25/18   Blair Small MD       Allergies:  Morphine    Social History:      The patient currently lives at home. TOBACCO:   reports that he has been smoking cigarettes. He has a 16.50 pack-year smoking history.  He has never used smokeless tobacco.  ETOH:   reports previous alcohol use. E-Cigarettes/Vaping Use     Questions Responses    E-Cigarette/Vaping Use Never User    Start Date     Passive Exposure     Quit Date     Counseling Given     Comments             Family History:      Reviewed in detail. Positive as follows:        Problem Relation Age of Onset    Diabetes Mother     Heart Disease Father     Kidney Disease Sister         stage 4-kidney failure    Cancer Sister     Heart Disease Sister     Obesity Sister     Cancer Sister     Heart Disease Sister     Obesity Sister     Alcohol Abuse Brother        REVIEW OF SYSTEMS COMPLETED:   Pertinent positives as noted in the HPI. All other systems reviewed and negative. PHYSICAL EXAM PERFORMED:    /70   Pulse 86   Temp 97.6 °F (36.4 °C) (Oral)   Resp 20   Ht 5' 9\" (1.753 m) Comment: pt. states  Wt 255 lb 4.7 oz (115.8 kg)   SpO2 100%   BMI 37.70 kg/m²     General appearance:  No apparent distress, appears stated age and cooperative. HEENT:  Normal cephalic, atraumatic without obvious deformity. Pupils equal, round, and reactive to light. Extra ocular muscles intact. Conjunctivae/corneas clear. Neck: Supple, with full range of motion. No jugular venous distention. Trachea midline. Respiratory:  Normal respiratory effort. Diminished bibasilar breath sounds. Cardiovascular:  Regular rate and rhythm with normal S1/S2 without murmurs, rubs or gallops. Abdomen: Soft, non-tender, non-distended with normal bowel sounds. Musculoskeletal:  No clubbing, cyanosis or edema bilaterally. Full range of motion without deformity. Skin: Skin color, texture, turgor normal.  No rashes or lesions. Neurologic:  Neurovascularly intact without any focal sensory/motor deficits.  Cranial nerves: II-XII intact, grossly non-focal.  Psychiatric:  Alert and oriented, thought content appropriate, normal insight  Capillary Refill: Brisk,3 seconds, normal  Peripheral Pulses: +2 palpable, equal bilaterally       Labs:     Recent Labs     07/27/21  0142 07/27/21  0511   WBC 15.8* 12.4*   HGB 11.6* 10.7*   HCT 34.8* 32.0*    297     Recent Labs     07/27/21  0142 07/27/21  0511    135*   K 4.5 4.7    98*   CO2 23 22   BUN 44* 45*   CREATININE 6.1* 6.3*   CALCIUM 9.5 9.2     Recent Labs     07/27/21  0142 07/27/21  0511   AST 12* 12*   ALT 10 11   BILITOT <0.2 <0.2   ALKPHOS 109 114     Recent Labs     07/27/21 0142   INR 0.95     Recent Labs     07/27/21 0142 07/27/21  0511   TROPONINI 0.35* 0.33*       Urinalysis:      Lab Results   Component Value Date    NITRU Negative 06/26/2021    WBCUA 6-9 06/26/2021    BACTERIA Rare 06/26/2021    RBCUA 11-20 06/26/2021    BLOODU SMALL 06/26/2021    SPECGRAV 1.015 06/26/2021    GLUCOSEU >=1000 06/26/2021       Radiology:       XR CHEST PORTABLE   Final Result   Findings suggesting mild central pulmonary vascular congestion/interstitial   pulmonary edema. ASSESSMENT/PLAN:    Active Hospital Problems    Diagnosis Date Noted    Acute respiratory failure ([facility] [J96.00] 11/09/2018       Fluid overload likely due to ESRD and medical non-compliance with HD  - Nephrology consulted, fluid removal with HD, on MWF schedule    Chronic troponin elevation   - EKG non-acute  - no complaints of angina  - likely secondary to reduced renal clearance   - monitor pt on telemetry    DMII  - poorly controlled, A1C 10  - continue basal bolus insulin regimen   - carb-control diet      CAD, AS s/p TAVR  - no active chest pain  - continue ASA, statin, imdur     HTN  - poorly controlled  - continue home losartan, cardizem, hydralazine     HLD  - continue home statin     COPD  - no evidence of exacerbation  - continue home inhalers     Anemia  - 2/2 ESRD  - at baseline     ZAINAB  - CPAP QHS and during the day with naps      Morbid Obesity  - With Body mass index is 44.32 kg/m². Complicating assessment and treatment.  Placing patient at risk for multiple co-morbidities as well as early death and contributing to the patient's presentation. Counseled on weight loss. DVT Prophylaxis: SQ heparin   Diet: ADULT DIET; Regular; 5 carb choices (75 gm/meal)  Code Status: Full Code    PT/OT Eval Status: not indicated     Dispo - > 2 days        [address] [facility], APRN - CNP    Thank you Tomeka Solis MD for the opportunity to be involved in this patient's care. If you have any questions or concerns please feel free to contact me at [phone].

## 2021-07-27 NOTE — FLOWSHEET NOTE
Patient admitted to room 443 from ED 1. Patient oriented to room, call light, bed rails, phone, lights and bathroom. Patient instructed about the schedule of the day including: vital sign frequency, lab draws, possible tests, frequency of MD and staff rounds, including RN/MD rounding together at bedside, daily weights, and I &O's. Patient instructed about prescribed diet, how to use 8MENU, and television. Bed alarm in place, patient aware of placement and reason. Telemetry box 116 in place, patient aware of placement and reason. Bed locked, in lowest position, side rails up 2/4, call light within reach. Will continue to monitor. 07/27/21 0500   Vital Signs   Temp 98.3 °F (36.8 °C)   Temp Source Oral   Pulse 92   Heart Rate Source Monitor   Resp 22   BP (!) 163/85   BP Location Right upper arm   MAP (mmHg) 111   Patient Position High fowlers   Level of Consciousness Alert (0)   MEWS Score 2   Patient Currently in Pain Yes   Pain Assessment   Pain Assessment 0-10   Pain Level 10   Patient's Stated Pain Goal No pain   Pain Type Chronic pain   Pain Location Back; Hip   Guajardo-Baker Pain Rating 4   Oxygen Therapy   SpO2 97 %   Pulse Oximeter Device Mode Continuous   Pulse Oximeter Device Location Finger   O2 Device Nasal cannula   Height and Weight   Weight 276 lb 10.8 oz (125.5 kg)   Weight Method Bed scale   BMI (Calculated) 43.4

## 2021-07-27 NOTE — ED PROVIDER NOTES
[address] [facility]  ED  EMERGENCY DEPARTMENT ENCOUNTER      Pt Name: Sonia Allen  MRN: [id number]  [place] 1970  Date of evaluation: 7/27/2021  Provider: Riaz Miller MD    CHIEF COMPLAINT       Chief Complaint   Patient presents with    Shortness of Breath     Patient arrived via EMs after waking up [facility]B. Patient missed Dialysis this am. Patient was 74 on room air upon EMS arrival. Patient on CPAP upon arrival.          HISTORY OF PRESENT ILLNESS   (Location/Symptom, Timing/Onset, Context/Setting, Quality, Duration, Modifying Factors, Severity)  Note limiting factors. Sonia Allen is a 46 y.o. male with past medical history of hypertension, hyperlipidemia, coronary artery disease, CHF, end-stage renal disease on hemodialysis here today with shortness of breath    Patient presents emergency department today complaining of shortness of breath. He has a history of dialysis noncompliance and states he did not go to dialysis yesterday and was last dialyzed on Friday. Denies significant cough. Does have a substernal chest pressure. Notes he has some mild swelling in his legs and abdomen. Has had no fevers or chills. No dysuria or hematuria. States he has been compliant with all of his medications. Woke up this morning very short of breath. EMS report that his oxygen saturations were in the high 70s and low 80s and he was placed on BiPAP and transported to the hospital.    HPI    Nursing Notes were reviewed. REVIEW OF SYSTEMS    (2-9 systems for level 4, 10 or more for level 5)     Review of Systems    Please see HPI for pertinent positive and negative review of system findings. A full 10 system ROS was performed and otherwise negative.         PAST MEDICAL HISTORY     Past Medical History:   Diagnosis Date    Ambulatory dysfunction     walker for long distances, SOB with distance    Aortic stenosis     echo 2017    Arthritis     hands and hips    Asthma     Bilateral hilar adenopathy syndrome 6/3/2013    CAD (coronary artery disease)     [facility]) 04/19/2019    EF= 43%    CHF (congestive heart failure) ([address]     Chronic pain     COPD (chronic obstructive pulmonary disease) ([address]     pulmonology Dr. Anamika Elena    Depression     Diabetes mellitus ([address]     borderline    Difficult intravenous access     Emphysema of lung ([address]     ESRD (end stage renal disease) on dialysis ([address]     MWF    Fear of needles     Gastric ulcer     GERD (gastroesophageal reflux disease)     Heart valve problem     bicuspic valve    Hemodialysis patient ([address]     History of spinal fracture     work incident    Hx of blood clots     Bilateral lower extremities; stents in place    Hyperlipidemia     Hypertension     MI (myocardial infarction) ([address] 2019    has had 9 MIs. 2019 was the last    Neuromuscular disorder [facility]     due to CVA    Numbness and tingling in left arm     from fistula    Pneumonia     PONV (postoperative nausea and vomiting)     Prolonged emergence from general anesthesia     States requires more medication than most people    Sleep apnea     Uses CPAP    Stroke ([facility]     7mm thalamic cva 2017 deficts left side, left side weakness    TIA (transient ischemic attack)     Unspecified diseases of blood and blood-forming organs          SURGICAL HISTORY       Past Surgical History:   Procedure Laterality Date    AORTIC VALVE REPLACEMENT N/A 10/15/2019    TRANSCATHETER AORTIC VALVE REPLACEMENT FEMORAL APPROACH performed by Adelaide Padilla MD at [facility] [address] Right 7/2/2019    PERITONEAL DIALYSIS CATHETER REMOVAL performed by Dionna Grossman MD at [facility] COLONOSCOPY  2/29/2015    WN    CORONARY ANGIOPLASTY WITH STENT PLACEMENT  05/26/15    CYST REMOVAL  08/14/2013    EXCISION CYSTS, NECK X2 AND ABDOMINAL benign    DIAGNOSTIC CARDIAC CATH LAB PROCEDURE      DIALYSIS FISTULA EVERY 5 MINUTES UP TO 3 TIMES. IF NO RELIEF CALL 911. NYSTATIN (MYCOSTATIN) 608061 UNIT/GM CREAM    Apply topically 2 times daily. ONDANSETRON (ZOFRAN ODT) 4 MG DISINTEGRATING TABLET    Take 1 tablet by mouth every 8 hours as needed for Nausea    OXYCODONE-ACETAMINOPHEN (PERCOCET) 7.5-325 MG PER TABLET    Take 1 tablet by mouth every 4-6 hours as needed for Pain for up to 30 days. PANTOPRAZOLE (PROTONIX) 40 MG TABLET    TAKE (1) TABLET BY MOUTH EACH MORNING BEFORE BREAKFAST    POLYETHYLENE GLYCOL 3350 GRAN        PRAVASTATIN (PRAVACHOL) 40 MG TABLET    Take 1 tablet by mouth nightly    PREDNISONE (DELTASONE) 20 MG TABLET    Take 3 tablets daily for 3 days, 2 tablets daily for 3 days and 1 tablet daily for 3 days.     QUETIAPINE (SEROQUEL) 50 MG TABLET    Take 1 tablet by mouth every evening    SIMETHICONE (MYLICON) 80 MG CHEWABLE TABLET    Take 1 tablet by mouth every 6 hours as needed (cramping)    TIOTROPIUM BROMIDE-OLODATEROL (STIOLTO RESPIMAT) 2.5-2.5 MCG/ACT AERS    Inhale 2 puffs into the lungs daily    TIZANIDINE (ZANAFLEX) 4 MG TABLET    TAKE 1 TABLET BY MOUTH THREE TIMES DAILY    TRAZODONE (DESYREL) 150 MG TABLET    TAKE (1) TABLET BY MOUTH NIGHTLY    VITAMIN D (ERGOCALCIFEROL) 63421 UNITS CAPS CAPSULE    TK 1 C PO WEEKLY       ALLERGIES     Morphine    FAMILY HISTORY       Family History   Problem Relation Age of Onset    Diabetes Mother     Heart Disease Father     Kidney Disease Sister         stage 4-kidney failure    Cancer Sister     Heart Disease Sister     Obesity Sister     Cancer Sister     Heart Disease Sister     Obesity Sister     Alcohol Abuse Brother           SOCIAL HISTORY       Social History     Socioeconomic History    Marital status:      Spouse name: None    Number of children: None    Years of education: None    Highest education level: None   Occupational History    None   Tobacco Use    Smoking status: Current Every Day Smoker     Packs/day: 0.50 Years: 33.00     Pack years: 16.50     Types: Cigarettes     Last attempt to quit: 2020     Years since quittin.2    Smokeless tobacco: Never Used   Vaping Use    Vaping Use: Never used   Substance and Sexual Activity    Alcohol use: Not Currently     Alcohol/week: 0.0 standard drinks     Comment: occ    Drug use: No    Sexual activity: Yes     Partners: Female     Comment:    Other Topics Concern    None   Social History Narrative    None     Social Determinants of Health     Financial Resource Strain:     Difficulty of Paying Living Expenses:    Food Insecurity:     Worried About Running Out of Food in the Last Year:     Ran Out of Food in the Last Year:    Transportation Needs:     Lack of Transportation (Medical):  Lack of Transportation (Non-Medical):    Physical Activity:     Days of Exercise per Week:     Minutes of Exercise per Session:    Stress:     Feeling of Stress :    Social Connections:     Frequency of Communication with Friends and Family:     Frequency of Social Gatherings with Friends and Family:     Attends Synagogue Services:     Active Member of Clubs or Organizations:     Attends Club or Organization Meetings:     Marital Status:    Intimate Partner Violence:     Fear of Current or Ex-Partner:     Emotionally Abused:     Physically Abused:     Sexually Abused:        SCREENINGS               PHYSICAL EXAM    (up to 7 for level 4, 8 or more for level 5)     ED Triage Vitals [21 0136]   BP Temp Temp src Pulse Resp SpO2 Height Weight   (!) 191/103 -- -- 119 24 100 % 5' 7\" (1.702 m) 255 lb (115.7 kg)       Physical Exam    General appearance:  Cooperative. Uncomfortable appearing with increased work of breathing  Skin:  Warm. Dry. Eye:  Extraocular movements intact. Ears, nose, mouth and throat:  Oral mucosa moist,  Neck:  Trachea midline.      Heart: Tachycardic but regular  Perfusion:  intact  Respiratory: Very distant breath sounds but no clear wheezing or crackles appreciated. Increased work of breathing noted. Abdominal:   Non distended. Nontender  Neurological:  Alert and oriented x 3.   Moves all extremities spontaneously  Musculoskeletal:   Normal ROM, no deformities          Psychiatric:  Normal mood      DIAGNOSTIC RESULTS       Labs Reviewed   CBC WITH AUTO DIFFERENTIAL - Abnormal; Notable for the following components:       Result Value    WBC 15.8 (*)     RBC 3.78 (*)     Hemoglobin 11.6 (*)     Hematocrit 34.8 (*)     RDW 15.7 (*)     Neutrophils Absolute 13.2 (*)     All other components within normal limits    Narrative:     Performed at:  [place] [address] [facility]   Phone [phone]   COMPREHENSIVE METABOLIC PANEL W/ REFLEX TO MG FOR LOW K - Abnormal; Notable for the following components:    Glucose 292 (*)     BUN 44 (*)     CREATININE 6.1 (*)     GFR Non- 10 (*)     GFR African American 12 (*)     Albumin/Globulin Ratio 1.0 (*)     AST 12 (*)     All other components within normal limits    Narrative:     Maritza Barber tel. [phone],  Chemistry results called to and read back by Oscar Clark RN, 07/27/2021  02:37, by Heaven Ulloa  Performed at:  [place] [address] [facility] [facility] [facility]   Phone [phone]   TROPONIN - Abnormal; Notable for the following components:    Troponin 0.35 (*)     All other components within normal limits    Narrative:     Maritza Barber tel. [phone],  Chemistry results called to and read back by Oscar Clark RN, 07/27/2021  02:37, by Heaven Ulloa  Performed at:  [place] [address] [employer]   Phone [phone]   BRAIN NATRIURETIC PEPTIDE - Abnormal; Notable for the following components:    Pro-BNP >70,000 (*)     All other components within normal limits    Narrative:     Maritza Barber tel. [phone],  Chemistry results called to and read back by Ruslan Sheppard RN, 07/27/2021  02:37, by Chelsie Trejo  Performed at:  [address], [facility] [employer]   Phone [phone]   BLOOD GAS, VENOUS - Abnormal; Notable for the following components:    pH, Blade 7.300 (*)     pO2, Blade 42.6 (*)     Base Excess, Blade -3.1 (*)     Carboxyhemoglobin 4.8 (*)     All other components within normal limits    Narrative:     Performed at:  [address], [facility] [employer]   Phone [phone]   PROTIME-INR    Narrative:     Performed at:  [address], [facility] [employer]   Phone [phone]   APTT    Narrative:     Performed at:  [address], [facility] [employer]   Phone [phone]   SAMPLE POSSIBLE BLOOD BANK TESTING       Interpretation per the Radiologist below, if obtained/available at the time of this note:    XR CHEST PORTABLE   Final Result   Findings suggesting mild central pulmonary vascular congestion/interstitial   pulmonary edema. All other labs/imaging were within normal range or not returned as of this dictation. EMERGENCY DEPARTMENT COURSE and DIFFERENTIAL DIAGNOSIS/MDM:   Vitals:    Vitals:    07/27/21 0143 07/27/21 0209 07/27/21 0221 07/27/21 0307   BP:   (!) 163/94 (!) 155/99   Pulse:  112 109 104   Resp: 23 23 20 20   SpO2: 100% 99% 100% 100%   Weight:       Height:           EKG: Sinus tachycardia rate of 104 bpm.  Anterior Q waves. No ST elevation. Compared to prior from 6/26/2021 no change. Patient presents emergency department today complaining of shortness of breath. Evidence of acute hypoxic respiratory failure placed on CPAP here. Initially quite hypertensive and quite tachycardic. Suspect secondary to fluid overload.   Nitro paste placed on the chest.  Was found to have acute on chronic troponin elevation with markedly elevated BNP and chest x-ray concerning for CHF. Suspect this secondary to dialysis noncompliance. Was given an aspirin here. Low suspicion for acute coronary syndrome at present. Patient feels much improved now that he is on BiPAP with Nitropaste and is chest pain-free at present. I did contact nephrology who recommended administration of diuretics which was performed here. They will arrange for dialysis early in the morning. Patient will be admitted to the hospital for further management    MDM    CONSULTS     IP CONSULT TO NEPHROLOGY  IP CONSULT TO HOSPITALIST    Critical Care:     CRITICAL CARE TIME   Total Critical Care time was 30 minutes, excluding separately reportable procedures. There was a high probability of clinically significant/life threatening deterioration in the patient's condition which required my urgent intervention. This time was spent reviewing the patient chart, interpreting diagnostic/laboratory data, administration of continuous positive airway pressure and supplemental oxygen for hypoxic respiratory failure secondary to fluid overload      REASSESSMENT          PROCEDURE     Unless otherwise noted below, none     Procedures      FINAL IMPRESSION      1. Acute respiratory failure with hypoxia ([address]    2. Hypervolemia, unspecified hypervolemia type    3. Elevated troponin            DISPOSITION/PLAN   DISPOSITION Decision To Admit 07/27/2021 03:25:12 AM        PATIENT REFERRED TO:  No follow-up provider specified. DISCHARGE MEDICATIONS:  New Prescriptions    No medications on file     Controlled Substances Monitoring:     RX Monitoring 8/4/2017   Attestation The Prescription Monitoring Report for this patient was reviewed today. Periodic Controlled Substance Monitoring No signs of potential drug abuse or diversion identified.        (Please note that portions of this note were completed with a voice recognition program.  Efforts were made to edit the dictations but occasionally words are mis-transcribed.)    Dileep Singh MD (electronically signed)  Attending Emergency Physician            Mariangel Burks MD  07/27/21 [id number]

## 2021-07-27 NOTE — PROGRESS NOTES
Lester served Dr. Kandy Ya following secure message. .. \"patient currently in dialysis. Per dialysis RN, patient very SOB, and diminished. He is working hard to breathe, and is requesting a rescue inhaler. \"    Actions taken: Orders in for inhaler.  Contacted resp therapist. She will go see patient in dialysis

## 2021-07-27 NOTE — CONSULTS
NEPHROLOGY CONSULTATION      Reason for Consult:  ESRD  Requesting Physician:        HISTORY OF PRESENT ILLNESS:      The patient is a 46 y.o. male with significant past medical history of ESRD on maintenance HD MWF at [facility] who missed HD yesterday because of N/Vand worsening SOB; came to the ER instead  Past Medical History:    reviewed  Past Surgical History:    reviewed  Current Medications:    List reviewed  Allergies:  Morphine    Social History:    Lives at home    Family History:   No renal disease  REVIEW OF SYSTEMS:    As per HPI  PHYSICAL EXAM:      Vitals: Wt Readings from Last 3 Encounters:   07/27/21 276 lb 10.8 oz (125.5 kg)   06/29/21 255 lb 9.6 oz (115.9 kg)   04/26/21 271 lb 6.2 oz (123.1 kg)     Temp Readings from Last 3 Encounters:   07/27/21 98.3 °F (36.8 °C) (Oral)   06/29/21 97.6 °F (36.4 °C) (Oral)   04/27/21 98.6 °F (37 °C) (Oral)     BP Readings from Last 3 Encounters:   07/27/21 (!) 163/85   06/29/21 (!) 110/55   04/27/21 (!) 151/95     Pulse Readings from Last 3 Encounters:   07/27/21 92   06/29/21 89   04/27/21 87   General appearance:  No apparent distress, appears stated age and cooperative. HEENT:  Normal cephalic, atraumatic without obvious deformity. Pupils equal, round, and reactive to light. Extra ocular muscles intact. Conjunctivae/corneas clear. Neck: Supple, with full range of motion. No jugular venous distention. Trachea midline. Respiratory:  Normal respiratory effort. Clear to auscultation, bilaterally without Rales/Wheezes/Rhonchi. Cardiovascular:  Regular rate and rhythm with normal S1/S2 without murmurs, rubs or gallops. Abdomen: Soft, non-tender, non-distended with normal bowel sounds. Musculoskeletal:  No clubbing, cyanosis or edema bilaterally. Full range of motion without deformity. Skin: Skin color, texture, turgor normal.  No rashes or lesions. Neurologic:  Neurovascularly intact without any focal sensory/motor deficits.  Cranial nerves: II-XII intact, grossly non-focal.    DATA:      Lab Results   Component Value Date    WBC 12.4 (H) 07/27/2021    HGB 10.7 (L) 07/27/2021    HCT 32.0 (L) 07/27/2021    MCV 90.8 07/27/2021     07/27/2021     Lab Results   Component Value Date     07/27/2021    K 4.7 07/27/2021    CL 98 07/27/2021    CO2 22 07/27/2021    BUN 45 07/27/2021    CREATININE 6.3 07/27/2021    GLUCOSE 311 07/27/2021    CALCIUM 9.2 07/27/2021            IMPRESSION/RECOMMENDATIONS:      1. ESRD  -HD MWF at [facility]  -missed HD yesterday, for HD today  -outpatient prescription revewed, will adjust as needed    2. SOB-  -? Volume overload, as he is almost at DW pre-HD; also with bad asthma/emphysema

## 2021-07-27 NOTE — PROGRESS NOTES
NEPHROLOGY HD NOTE    CC: ESRD  HPI: admitted with SOB  SUBJECTIVE  Seen during HD  SOB better  TDC not working well, had to cath-flow      SOC: no visitors        Scheduled Meds:   sodium chloride flush  10 mL Intravenous 2 times per day    heparin (porcine)  5,000 Units Subcutaneous 3 times per day    insulin glargine  35 Units Subcutaneous Daily    [START ON 7/28/2021] epoetin siddharth-epbx  3,400 Units Intravenous Q MWF    [START ON 7/28/2021] doxercalciferol  2 mcg Intravenous Q MWF    insulin lispro  0-12 Units Subcutaneous TID WC    insulin lispro  0-6 Units Subcutaneous Nightly    tiotropium-olodaterol  2 puff Inhalation Daily    aspirin  81 mg Oral Daily    pravastatin  40 mg Oral Nightly    [START ON 7/28/2021] losartan  50 mg Oral Daily    [START ON 7/28/2021] pantoprazole  40 mg Oral QAM AC    [START ON 7/28/2021] isosorbide mononitrate  30 mg Oral Daily    QUEtiapine  50 mg Oral Nightly    gabapentin  100 mg Oral TID    [START ON 7/28/2021] DULoxetine  30 mg Oral Daily     Continuous Infusions:   sodium chloride      dextrose       PRN Meds:sodium chloride flush, sodium chloride, promethazine **OR** ondansetron, acetaminophen **OR** acetaminophen, glucose, dextrose, glucagon (rDNA), dextrose, heparin (porcine), heparin (porcine), ipratropium-albuterol, albuterol sulfate HFA, oxyCODONE-acetaminophen      Objective:      Physical Exam  Wt Readings from Last 3 Encounters:   07/27/21 255 lb 4.7 oz (115.8 kg)   06/29/21 255 lb 9.6 oz (115.9 kg)   04/26/21 271 lb 6.2 oz (123.1 kg)     Temp Readings from Last 3 Encounters:   07/27/21 97.6 °F (36.4 °C) (Oral)   06/29/21 97.6 °F (36.4 °C) (Oral)   04/27/21 98.6 °F (37 °C) (Oral)     BP Readings from Last 3 Encounters:   07/27/21 126/70   06/29/21 (!) 110/55   04/27/21 (!) 151/95     Pulse Readings from Last 3 Encounters:   07/27/21 86   06/29/21 89   04/27/21 87     General appearance:  NAD. Respiratory:  Normal respiratory effort.  Clear to auscultation, bilaterally without Rales/Wheezes/Rhonchi. Cardiovascular:  Regular rate and rhythm with normal S1/S2 without murmurs, rubs or gallops. Abdomen: Soft, non-tender, non-distended with normal bowel sounds. Musculoskeletal:  No clubbing, cyanosis or edema bilaterally.    Skin: Skin color, texture, turgor normal.  No rashes or lesions.   Neurologic:  grossly non-focal.           Lab Review   Lab Results   Component Value Date    WBC 12.4 (H) 07/27/2021    HGB 10.7 (L) 07/27/2021    HCT 32.0 (L) 07/27/2021    MCV 90.8 07/27/2021     07/27/2021     Lab Results   Component Value Date     07/27/2021    K 4.7 07/27/2021    CL 98 07/27/2021    CO2 22 07/27/2021    BUN 45 07/27/2021    CREATININE 6.3 07/27/2021    GLUCOSE 311 07/27/2021    CALCIUM 9.2 07/27/2021            Patient Active Problem List    Diagnosis Date Noted    Hypotension due to drugs 11/25/2017    Acute on chronic combined systolic and diastolic heart failure (HCC)     Ischemic cardiomyopathy     Elevated brain natriuretic peptide (BNP) level     Dyslipidemia     Type 2 diabetes, uncontrolled, with neuropathy ([address] 03/04/2014    Bicuspid aortic valve 06/03/2013    Chronic dCHF (grade 2 LVDD) 05/14/2013    CAD (coronary artery disease) 05/14/2013    PVD (peripheral vascular disease) ([address] 05/14/2013    S/P TAVR (transcatheter aortic valve replacement) 10/19/2019    Chest pain     Essential hypertension 11/14/2013    Chronic obstructive pulmonary disease ([address] 05/14/2013    Type 2 diabetes mellitus with hyperglycemia ([address] 06/27/2021    Acute encephalopathy 06/27/2021    Cellulitis and abscess of hand 04/24/2021    Iliac artery occlusion, right (HCC)     Cellulitis of right foot 01/29/2021    Peripheral vascular occlusive disease ([address] 01/02/2021    Ataxia     Weakness of both lower extremities 12/30/2020    Sinus bradycardia 12/30/2020    Sinus pause     GBS (Guillain-[place] syndrome) ([facility])     Bilateral leg weakness 12/04/2020    DKA, type 1, not at goal [facility]) 04/25/2020    Generalized weakness 02/04/2020    Former smoker 11/19/2019    Bradycardia 10/19/2019    Syncope and collapse 10/19/2019    Atrial fibrillation ([address]     Hypercholesteremia 07/30/2019    Chronic bronchitis (HCC) 05/21/2019    Nasal congestion 05/21/2019    Chronic, continuous use of opioids 04/15/2019    Steal syndrome of dialysis vascular access (HCC)     SOB (shortness of breath) 52/77/8142    Diastolic dysfunction 45/10/9889    Anemia of chronic disease 11/12/2018    Acute respiratory failure ([address] 11/09/2018    Pulmonary edema 11/09/2018    Fluid overload 11/09/2018    Renovascular hypertension     Mixed hyperlipidemia     Cigarette nicotine dependence in remission     Acute combined systolic and diastolic CHF, NYHA class 4 ([address] 09/14/2018    Neuromuscular disorder (HCC)     Acute diastolic CHF (congestive heart failure) ([address] 03/18/2018    Hemodialysis-associated hypotension 11/22/2017    Left arm pain 11/22/2017    Dizziness 11/22/2017    ESRD (end stage renal disease) on dialysis ([address] 11/22/2017    Mucus plugging of bronchi     Hypervolemia     Hyperkalemia     Nonrheumatic aortic valve stenosis     Pleural effusion     Chronic anemia     Diarrhea 08/04/2017    Arterial ischemic stroke, ICA, right, acute ([address]     Type 2 diabetes mellitus without complication, without long-term current use of insulin ([address]     HTN (hypertension), benign     ZAINAB (obstructive sleep apnea)     Tobacco abuse 05/21/2017    CKD stage 4 due to type 2 diabetes mellitus ([address] 05/21/2017    CVA (cerebral vascular accident) ([address] 05/21/2017    Chronic renal failure, stage 5 ([address] 03/07/2017    Chest pressure 02/17/2017    Hypertensive urgency 02/17/2017    Hypoxia     Respiratory distress     Angina, class IV ([facility])     Dyspnea     Gastroparesis due to DM ([address]     Biliary colic 03/99/6053    Symptomatic cholelithiasis

## 2021-07-27 NOTE — PROGRESS NOTES
07/27/21 1336   RT Protocol   Smoking Status 2   Surgical status 0   Xray 3   Respiratory pattern 0   Mental Status 0   Breath sounds 1   Cough 0   Activity level 1   Oxygen Requirement 1   Indications for Bronchodilator Therapy Decreased or absent breath sounds   Bronchodilator Assessment Score 6   Indications for Bronchial Hygiene None   Bronchial Hygiene Assessment Score 7   Indications for Volume Expansion None   Volume Expansion Assessment Score 6

## 2021-07-27 NOTE — PROGRESS NOTES
Treatment time:210 total time     Net UF: 3000    Pre weight: 119.2  Post weight: 115.8  EDW: 119    Access used: LCVC  Poor flow  Cath flow used   Post cath flow max   Access function: see above    Medications or blood products given: cath flow    Regular outpatient schedule: BETTY Jeronimo    Summary of response to treatment: 3 liters removed    New dry 116  Kg   MAX  post cath flow. Had issue with SOB  Breathing  treatmetn x 2 given per respitory    Copy of dialysis treatment record placed in chart, to be scanned into EMR.

## 2021-07-27 NOTE — CARE COORDINATION
Currently off unit for HD. Will follow and complete initial assessment when able.     Marta Hernandez RN

## 2021-07-27 NOTE — PROGRESS NOTES
07/27/21 0143   NIV Type   $NIV $Daily Charge   Equipment Type v60   Mode CPAP   Mask Type Full face mask   Mask Size Large   Settings/Measurements   CPAP/EPAP 12 cmH2O   Resp 23   FiO2  60 %   Vt Exhaled 572 mL   Minute Volume 12.5 Liters   Mask Leak (lpm) 56 lpm   Comfort Level Good   Using Accessory Muscles Yes   SpO2 9   Breath Sounds   Right Upper Lobe Diminished   Right Middle Lobe Diminished   Right Lower Lobe Diminished   Left Upper Lobe Diminished   Left Lower Lobe Diminished   Alarm Settings   Alarms On Y   Press Low Alarm 6 cmH2O   High Pressure Alarm 30 cmH2O   Apnea (secs) 20 secs   Resp Rate Low Alarm 6   High Respiratory Rate 40 br/min

## 2021-07-28 LAB
A/G RATIO: 0.9 (ref 1.1–2.2)
ALBUMIN SERPL-MCNC: 2.8 G/DL (ref 3.4–5)
ALP BLD-CCNC: 87 U/L (ref 40–129)
ALT SERPL-CCNC: 9 U/L (ref 10–40)
ANION GAP SERPL CALCULATED.3IONS-SCNC: 10 MMOL/L (ref 3–16)
AST SERPL-CCNC: 11 U/L (ref 15–37)
BASOPHILS ABSOLUTE: 0.1 K/UL (ref 0–0.2)
BASOPHILS RELATIVE PERCENT: 1.1 %
BILIRUB SERPL-MCNC: <0.2 MG/DL (ref 0–1)
BUN BLDV-MCNC: 37 MG/DL (ref 7–20)
CALCIUM SERPL-MCNC: 7.9 MG/DL (ref 8.3–10.6)
CHLORIDE BLD-SCNC: 99 MMOL/L (ref 99–110)
CO2: 24 MMOL/L (ref 21–32)
CREAT SERPL-MCNC: 5.3 MG/DL (ref 0.9–1.3)
EOSINOPHILS ABSOLUTE: 0.5 K/UL (ref 0–0.6)
EOSINOPHILS RELATIVE PERCENT: 6 %
GFR AFRICAN AMERICAN: 14
GFR NON-AFRICAN AMERICAN: 11
GLOBULIN: 3.2 G/DL
GLUCOSE BLD-MCNC: 197 MG/DL (ref 70–99)
GLUCOSE BLD-MCNC: 198 MG/DL (ref 70–99)
GLUCOSE BLD-MCNC: 231 MG/DL (ref 70–99)
GLUCOSE BLD-MCNC: 328 MG/DL (ref 70–99)
HCT VFR BLD CALC: 30.9 % (ref 40.5–52.5)
HEMOGLOBIN: 10.6 G/DL (ref 13.5–17.5)
LYMPHOCYTES ABSOLUTE: 1.2 K/UL (ref 1–5.1)
LYMPHOCYTES RELATIVE PERCENT: 13.5 %
MCH RBC QN AUTO: 31.2 PG (ref 26–34)
MCHC RBC AUTO-ENTMCNC: 34.3 G/DL (ref 31–36)
MCV RBC AUTO: 90.9 FL (ref 80–100)
MONOCYTES ABSOLUTE: 0.7 K/UL (ref 0–1.3)
MONOCYTES RELATIVE PERCENT: 7.8 %
NEUTROPHILS ABSOLUTE: 6.1 K/UL (ref 1.7–7.7)
NEUTROPHILS RELATIVE PERCENT: 71.6 %
PDW BLD-RTO: 15.5 % (ref 12.4–15.4)
PERFORMED ON: ABNORMAL
PLATELET # BLD: 244 K/UL (ref 135–450)
PMV BLD AUTO: 7.8 FL (ref 5–10.5)
POTASSIUM REFLEX MAGNESIUM: 4.4 MMOL/L (ref 3.5–5.1)
RBC # BLD: 3.4 M/UL (ref 4.2–5.9)
SODIUM BLD-SCNC: 133 MMOL/L (ref 136–145)
TOTAL PROTEIN: 6 G/DL (ref 6.4–8.2)
TROPONIN: 0.3 NG/ML
WBC # BLD: 8.5 K/UL (ref 4–11)

## 2021-07-28 PROCEDURE — 84484 ASSAY OF TROPONIN QUANT: CPT

## 2021-07-28 PROCEDURE — 2700000000 HC OXYGEN THERAPY PER DAY

## 2021-07-28 PROCEDURE — 94640 AIRWAY INHALATION TREATMENT: CPT

## 2021-07-28 PROCEDURE — 94761 N-INVAS EAR/PLS OXIMETRY MLT: CPT

## 2021-07-28 PROCEDURE — 2500000003 HC RX 250 WO HCPCS: Performed by: NURSE PRACTITIONER

## 2021-07-28 PROCEDURE — 94660 CPAP INITIATION&MGMT: CPT

## 2021-07-28 PROCEDURE — 6360000002 HC RX W HCPCS: Performed by: INTERNAL MEDICINE

## 2021-07-28 PROCEDURE — 85025 COMPLETE CBC W/AUTO DIFF WBC: CPT

## 2021-07-28 PROCEDURE — 36415 COLL VENOUS BLD VENIPUNCTURE: CPT

## 2021-07-28 PROCEDURE — 6370000000 HC RX 637 (ALT 250 FOR IP): Performed by: REGISTERED NURSE

## 2021-07-28 PROCEDURE — 90935 HEMODIALYSIS ONE EVALUATION: CPT

## 2021-07-28 PROCEDURE — 6370000000 HC RX 637 (ALT 250 FOR IP): Performed by: NURSE PRACTITIONER

## 2021-07-28 PROCEDURE — 6370000000 HC RX 637 (ALT 250 FOR IP): Performed by: INTERNAL MEDICINE

## 2021-07-28 PROCEDURE — 2580000003 HC RX 258: Performed by: INTERNAL MEDICINE

## 2021-07-28 PROCEDURE — 2060000000 HC ICU INTERMEDIATE R&B

## 2021-07-28 PROCEDURE — 80053 COMPREHEN METABOLIC PANEL: CPT

## 2021-07-28 RX ORDER — CALCIUM CARBONATE 200(500)MG
500 TABLET,CHEWABLE ORAL 3 TIMES DAILY PRN
Status: DISCONTINUED | OUTPATIENT
Start: 2021-07-28 | End: 2021-07-30 | Stop reason: HOSPADM

## 2021-07-28 RX ORDER — PREDNISONE 20 MG/1
40 TABLET ORAL DAILY
Status: DISCONTINUED | OUTPATIENT
Start: 2021-07-28 | End: 2021-07-30 | Stop reason: HOSPADM

## 2021-07-28 RX ADMIN — OXYCODONE AND ACETAMINOPHEN 1 TABLET: 5; 325 TABLET ORAL at 05:46

## 2021-07-28 RX ADMIN — HEPARIN SODIUM 5000 UNITS: 5000 INJECTION INTRAVENOUS; SUBCUTANEOUS at 14:30

## 2021-07-28 RX ADMIN — HEPARIN SODIUM 5000 UNITS: 5000 INJECTION INTRAVENOUS; SUBCUTANEOUS at 21:18

## 2021-07-28 RX ADMIN — IPRATROPIUM BROMIDE AND ALBUTEROL SULFATE 1 AMPULE: .5; 3 SOLUTION RESPIRATORY (INHALATION) at 08:41

## 2021-07-28 RX ADMIN — INSULIN LISPRO 2 UNITS: 100 INJECTION, SOLUTION INTRAVENOUS; SUBCUTANEOUS at 21:18

## 2021-07-28 RX ADMIN — GABAPENTIN 100 MG: 100 CAPSULE ORAL at 21:17

## 2021-07-28 RX ADMIN — TRAZODONE HYDROCHLORIDE 150 MG: 50 TABLET ORAL at 00:06

## 2021-07-28 RX ADMIN — TIOTROPIUM BROMIDE AND OLODATEROL 2 PUFF: 3.124; 2.736 SPRAY, METERED RESPIRATORY (INHALATION) at 08:41

## 2021-07-28 RX ADMIN — OXYCODONE AND ACETAMINOPHEN 1 TABLET: 5; 325 TABLET ORAL at 09:56

## 2021-07-28 RX ADMIN — HEPARIN SODIUM 5000 UNITS: 5000 INJECTION INTRAVENOUS; SUBCUTANEOUS at 05:47

## 2021-07-28 RX ADMIN — PRAVASTATIN SODIUM 40 MG: 40 TABLET ORAL at 21:17

## 2021-07-28 RX ADMIN — CALCIUM CARBONATE 500 MG: 500 TABLET, CHEWABLE ORAL at 17:53

## 2021-07-28 RX ADMIN — QUETIAPINE FUMARATE 50 MG: 25 TABLET ORAL at 21:17

## 2021-07-28 RX ADMIN — SODIUM CHLORIDE, PRESERVATIVE FREE 10 ML: 5 INJECTION INTRAVENOUS at 08:50

## 2021-07-28 RX ADMIN — INSULIN LISPRO 8 UNITS: 100 INJECTION, SOLUTION INTRAVENOUS; SUBCUTANEOUS at 16:59

## 2021-07-28 RX ADMIN — ASPIRIN 81 MG: 81 TABLET, CHEWABLE ORAL at 08:17

## 2021-07-28 RX ADMIN — GABAPENTIN 100 MG: 100 CAPSULE ORAL at 08:17

## 2021-07-28 RX ADMIN — FAMOTIDINE 20 MG: 10 INJECTION, SOLUTION INTRAVENOUS at 23:10

## 2021-07-28 RX ADMIN — CALCIUM CARBONATE 500 MG: 500 TABLET, CHEWABLE ORAL at 21:17

## 2021-07-28 RX ADMIN — GABAPENTIN 100 MG: 100 CAPSULE ORAL at 14:30

## 2021-07-28 RX ADMIN — OXYCODONE AND ACETAMINOPHEN 1 TABLET: 5; 325 TABLET ORAL at 21:24

## 2021-07-28 RX ADMIN — LOSARTAN POTASSIUM 50 MG: 25 TABLET, FILM COATED ORAL at 08:17

## 2021-07-28 RX ADMIN — PREDNISONE 40 MG: 20 TABLET ORAL at 16:00

## 2021-07-28 RX ADMIN — OXYCODONE AND ACETAMINOPHEN 1 TABLET: 5; 325 TABLET ORAL at 14:30

## 2021-07-28 RX ADMIN — SODIUM CHLORIDE, PRESERVATIVE FREE 10 ML: 5 INJECTION INTRAVENOUS at 21:18

## 2021-07-28 RX ADMIN — INSULIN GLARGINE 35 UNITS: 100 INJECTION, SOLUTION SUBCUTANEOUS at 08:17

## 2021-07-28 RX ADMIN — ISOSORBIDE MONONITRATE 30 MG: 30 TABLET, EXTENDED RELEASE ORAL at 08:17

## 2021-07-28 RX ADMIN — Medication 10 ML: at 23:10

## 2021-07-28 RX ADMIN — PANTOPRAZOLE SODIUM 40 MG: 40 TABLET, DELAYED RELEASE ORAL at 05:46

## 2021-07-28 ASSESSMENT — PAIN SCALES - GENERAL
PAINLEVEL_OUTOF10: 10
PAINLEVEL_OUTOF10: 10
PAINLEVEL_OUTOF10: 9

## 2021-07-28 ASSESSMENT — PAIN SCALES - WONG BAKER
WONGBAKER_NUMERICALRESPONSE: 4

## 2021-07-28 NOTE — FLOWSHEET NOTE
Treatment time: 3.25 hours  Net UF: 4800 ml     Pre weight: 120.1 kg   Post weight: 115.3 kg  EDW: 119 kg (challenging)     Access used: [facility] TDC  Access function: Good with  ml/min     Medications or blood products given: Heparin for catheter dwells     Regular outpatient schedule: MWF     Summary of response to treatment:      07/28/21 1019 07/28/21 1337   Vital Signs   BP (!) 174/84 107/73   Temp 97.5 °F (36.4 °C) 97 °F (36.1 °C)   Pulse 98 74   Resp 18 18   Weight 264 lb 12.4 oz (120.1 kg) 254 lb 3.1 oz (115.3 kg)   Percent Weight Change -0.01 -4   Pain Assessment   Pain Assessment 0-10 0-10   Pain Level 9 9   Post-Hemodialysis Assessment   Post-Treatment Procedures  --  Blood returned;Catheter capped, clamped and heparinized x 2 ports   Machine Disinfection Process  --  Acid/Vinegar Clean;Heat Disinfect; Exterior Machine Disinfection   Rinseback Volume (ml)  --  400 ml   Total Liters Processed (l/min)  --  61.8 l/min   Dialyzer Clearance  --  Moderately streaked   Duration of Treatment (minutes)  --  198 minutes   Heparin amount administered during treatment (units)  --  0 units   Hemodialysis Intake (ml)  --  400 ml   Hemodialysis Output (ml)  --  5200 ml   NET Removed (ml)  --  4800 ml   Tolerated Treatment  --  Fair   Copy of dialysis treatment record placed in chart, to be scanned into EMR.  Report called to NAVJOT NEAL

## 2021-07-28 NOTE — PROGRESS NOTES
filed at 7/28/2021 1337  Gross per 24 hour   Intake 1360 ml   Output 5500 ml   Net -4140 ml       Physical Exam Performed:    /73   Pulse 74   Temp 97 °F (36.1 °C)   Resp 18   Ht 5' 9\" (1.753 m) Comment: pt. states  Wt 254 lb 3.1 oz (115.3 kg)   SpO2 97%   BMI 37.54 kg/m²     General appearance: No apparent distress, appears stated age and cooperative. HEENT: Pupils equal, round, and reactive to light. Conjunctivae/corneas clear. Neck: Supple, with full range of motion. No jugular venous distention. Trachea midline. Respiratory:  Normal respiratory effort. Clear to auscultation, bilaterally without Rales/Wheezes/Rhonchi. Cardiovascular: Regular rate and rhythm with normal S1/S2 without murmurs, rubs or gallops. Abdomen: Soft, non-tender, non-distended with normal bowel sounds. Musculoskeletal: No clubbing, cyanosis or edema bilaterally. Full range of motion without deformity. Skin: Skin color, texture, turgor normal.  No rashes or lesions. Neurologic:  Neurovascularly intact without any focal sensory/motor deficits.  Cranial nerves: II-XII intact, grossly non-focal.  Psychiatric: Alert and oriented, thought content appropriate, normal insight  Capillary Refill: Brisk,3 seconds, normal   Peripheral Pulses: +2 palpable, equal bilaterally       Labs:   Recent Labs     07/27/21  0142 07/27/21  0511 07/28/21  0500   WBC 15.8* 12.4* 8.5   HGB 11.6* 10.7* 10.6*   HCT 34.8* 32.0* 30.9*    297 244     Recent Labs     07/27/21  0142 07/27/21  0511 07/28/21  0500    135* 133*   K 4.5 4.7 4.4    98* 99   CO2 23 22 24   BUN 44* 45* 37*   CREATININE 6.1* 6.3* 5.3*   CALCIUM 9.5 9.2 7.9*     Recent Labs     07/27/21  0142 07/27/21  0511 07/28/21  0500   AST 12* 12* 11*   ALT 10 11 9*   BILITOT <0.2 <0.2 <0.2   ALKPHOS 109 114 87     Recent Labs     07/27/21  0142   INR 0.95     Recent Labs     07/27/21  0142 07/27/21  0511 07/28/21  0500   TROPONINI 0.35* 0.33* 0.30*       Urinalysis:

## 2021-07-28 NOTE — PROGRESS NOTES
Nutrition Care:  Continue to monitor while inpatient    Goals:  Patient will eat 50% or greater of meals and supplements. Nutrition Monitoring and Evaluation:   Behavioral-Environmental Outcomes:      Food/Nutrient Intake Outcomes:  Food and Nutrient Intake  Physical Signs/Symptoms Outcomes:  Biochemical Data, Nutrition Focused Physical Findings     Discharge Planning:     Too soon to determine     Electronically signed by Irene Gorman, [address] [place] [address],  on 7/28/21 at 12:31 PM EDT    Contact: Office: [phone]; [address] [place] [address] Road: [phone]

## 2021-07-28 NOTE — PROGRESS NOTES
NEPHROLOGY PROGRESS  NOTE    CC: ESRD  HPI: admitted with SOB  SUBJECTIVE  Seen during HD  SOB better  TDC  working well today      [facility]: no visitors        Scheduled Meds:   sodium chloride flush  10 mL Intravenous 2 times per day    heparin (porcine)  5,000 Units Subcutaneous 3 times per day    insulin glargine  35 Units Subcutaneous Daily    epoetin siddharth-epbx  3,400 Units Intravenous Q MWF    doxercalciferol  2 mcg Intravenous Q MWF    insulin lispro  0-12 Units Subcutaneous TID WC    insulin lispro  0-6 Units Subcutaneous Nightly    tiotropium-olodaterol  2 puff Inhalation Daily    aspirin  81 mg Oral Daily    pravastatin  40 mg Oral Nightly    losartan  50 mg Oral Daily    pantoprazole  40 mg Oral QAM AC    isosorbide mononitrate  30 mg Oral Daily    QUEtiapine  50 mg Oral Nightly    gabapentin  100 mg Oral TID     Continuous Infusions:   sodium chloride      dextrose       PRN Meds:sodium chloride flush, sodium chloride, promethazine **OR** ondansetron, acetaminophen **OR** acetaminophen, glucose, dextrose, glucagon (rDNA), dextrose, heparin (porcine), heparin (porcine), ipratropium-albuterol, albuterol sulfate HFA, oxyCODONE-acetaminophen      Objective:      Physical Exam  Wt Readings from Last 3 Encounters:   07/28/21 264 lb 12.4 oz (120.1 kg)   06/29/21 255 lb 9.6 oz (115.9 kg)   04/26/21 271 lb 6.2 oz (123.1 kg)     Temp Readings from Last 3 Encounters:   07/28/21 97.5 °F (36.4 °C)   06/29/21 97.6 °F (36.4 °C) (Oral)   04/27/21 98.6 °F (37 °C) (Oral)     BP Readings from Last 3 Encounters:   07/28/21 (!) 174/84   06/29/21 (!) 110/55   04/27/21 (!) 151/95     Pulse Readings from Last 3 Encounters:   07/28/21 98   06/29/21 89   04/27/21 87     General appearance:  NAD. Respiratory:  Normal respiratory effort. Clear to auscultation, bilaterally without Rales/Wheezes/Rhonchi. Cardiovascular:  Regular rate and rhythm with normal S1/S2 without murmurs, rubs or gallops.   Abdomen: Soft, non-tender, non-distended with normal bowel sounds. Musculoskeletal:  No clubbing, cyanosis or edema bilaterally.    Skin: Skin color, texture, turgor normal.  No rashes or lesions.   Neurologic:  grossly non-focal.           Lab Review   Lab Results   Component Value Date    WBC 8.5 07/28/2021    HGB 10.6 (L) 07/28/2021    HCT 30.9 (L) 07/28/2021    MCV 90.9 07/28/2021     07/28/2021     Lab Results   Component Value Date     07/28/2021    K 4.4 07/28/2021    CL 99 07/28/2021    CO2 24 07/28/2021    BUN 37 07/28/2021    CREATININE 5.3 07/28/2021    GLUCOSE 197 07/28/2021    CALCIUM 7.9 07/28/2021            Patient Active Problem List    Diagnosis Date Noted    Hypotension due to drugs 11/25/2017    Acute on chronic combined systolic and diastolic heart failure (HCC)     Ischemic cardiomyopathy     Elevated brain natriuretic peptide (BNP) level     Dyslipidemia     Type 2 diabetes, uncontrolled, with neuropathy ([address] 03/04/2014    Bicuspid aortic valve 06/03/2013    Chronic dCHF (grade 2 LVDD) 05/14/2013    CAD (coronary artery disease) 05/14/2013    PVD (peripheral vascular disease) ([address] 05/14/2013    S/P TAVR (transcatheter aortic valve replacement) 10/19/2019    Chest pain     Essential hypertension 11/14/2013    Chronic obstructive pulmonary disease ([address] 05/14/2013    Type 2 diabetes mellitus with hyperglycemia ([address] 06/27/2021    Acute encephalopathy 06/27/2021    Cellulitis and abscess of hand 04/24/2021    Iliac artery occlusion, right (HCC)     Cellulitis of right foot 01/29/2021    Peripheral vascular occlusive disease ([address] 01/02/2021    Ataxia     Weakness of both lower extremities 12/30/2020    Sinus bradycardia 12/30/2020    Sinus pause     GBS (Guillain-[place] syndrome) ([facility])     Bilateral leg weakness 12/04/2020    DKA, type 1, not at goal [facility]) 04/25/2020    Generalized weakness 02/04/2020    Former smoker 11/19/2019    Bradycardia 10/19/2019    Syncope and collapse 10/19/2019    Atrial fibrillation ([address]     Hypercholesteremia 07/30/2019    Chronic bronchitis (HCC) 05/21/2019    Nasal congestion 05/21/2019    Chronic, continuous use of opioids 04/15/2019    Steal syndrome of dialysis vascular access (HCC)     SOB (shortness of breath) 53/83/5409    Diastolic dysfunction 70/77/0117    Anemia of chronic disease 11/12/2018    Acute respiratory failure ([address] 11/09/2018    Pulmonary edema 11/09/2018    Fluid overload 11/09/2018    Renovascular hypertension     Mixed hyperlipidemia     Cigarette nicotine dependence in remission     Acute combined systolic and diastolic CHF, NYHA class 4 ([address] 09/14/2018    Neuromuscular disorder (HCC)     Acute diastolic CHF (congestive heart failure) ([address] 03/18/2018    Hemodialysis-associated hypotension 11/22/2017    Left arm pain 11/22/2017    Dizziness 11/22/2017    ESRD (end stage renal disease) on dialysis ([address] 11/22/2017    Mucus plugging of bronchi     Hypervolemia     Hyperkalemia     Nonrheumatic aortic valve stenosis     Pleural effusion     Chronic anemia     Diarrhea 08/04/2017    Arterial ischemic stroke, ICA, right, acute ([address]     Type 2 diabetes mellitus without complication, without long-term current use of insulin ([address]     HTN (hypertension), benign     ZAINAB (obstructive sleep apnea)     Tobacco abuse 05/21/2017    CKD stage 4 due to type 2 diabetes mellitus ([address] 05/21/2017    CVA (cerebral vascular accident) ([address] 05/21/2017    Chronic renal failure, stage 5 ([address] 03/07/2017    Chest pressure 02/17/2017    Hypertensive urgency 02/17/2017    Hypoxia     Respiratory distress     Angina, class IV ([facility])     Dyspnea     Gastroparesis due to DM ([address]     Biliary colic 57/30/9408    Symptomatic cholelithiasis 01/04/2017    Degeneration of lumbar or lumbosacral intervertebral disc 03/18/2016    Lumbar radiculopathy 03/18/2016    Lumbosacral spondylosis without myelopathy 03/18/2016    ZAINAB on CPAP 02/11/2016    Coronary artery disease involving native coronary artery of native heart without angina pectoris     Obesity (BMI 30-39. 9)     CHF exacerbation ([address] 05/24/2015    Hypertensive heart disease with congestive heart failure with preserved left ventricular function ([address] 04/24/2015    Pneumonia of right upper lobe due to infectious organism 03/28/2015    DM (diabetes mellitus), secondary, uncontrolled, w/neurologic complic ([facility] 76/95/2676    Hematoma 03/17/2015    Depression with anxiety 06/05/2014    Passive smoke exposure 05/30/2014    Diabetic neuropathy ([address] 11/14/2013    Claudication in peripheral vascular disease ([address] 06/26/2013    Sleep apnea 06/03/2013    Bilateral hilar adenopathy syndrome 06/03/2013    Nonischemic cardiomyopathy ([address] 05/22/2013    Acute respiratory failure with hypoxia and hypercapnia ([facility] 05/14/2013       ASSESSMENT AND PLAN     1. ESRD  -HD MWF at [facility]  -seen during HD; decrease the DW to ~115kg, as tolerated     2. SOB-  -challenge DW   3. Anemia- Hgb at  target

## 2021-07-28 NOTE — PLAN OF CARE
Pt continues with pain medications. States getting moderate relief. Able to call for meds when needed. . Will continue to monitor

## 2021-07-28 NOTE — PROGRESS NOTES
Patient sating between 92-94% on room air at 0811. Patient began to eat breakfast and felt short of breath, writer checked O2 level and he was sating 96%. Writer had respiratory therapist give AM breathing tx. Patient remains SOB at this time. Patient placed on 3L and transport cancelled for dialysis. Will continue to monitor.

## 2021-07-28 NOTE — PROGRESS NOTES
07/28/21 0023   NIV Type   $NIV $Daily Charge   Skin Protection for O2 Device Yes   Location Nose   NIV Started/Stopped On   Equipment Type v6   Mode CPAP   Mask Type Full face mask   Mask Size Large   Bonnet size Large   Settings/Measurements   CPAP/EPAP 12 cmH2O   Resp 17   FiO2  30 %   Vt Exhaled 496 mL   Minute Volume 8.4 Liters   Mask Leak (lpm) 67 lpm   Comfort Level Good   Using Accessory Muscles No   SpO2 98   Alarm Settings   Alarms On Y   Press Low Alarm 6 cmH2O   High Pressure Alarm 30 cmH2O   Apnea (secs) 20 secs   Resp Rate Low Alarm 6   High Respiratory Rate 40 br/min

## 2021-07-29 DIAGNOSIS — E11.21 TYPE 2 DIABETES MELLITUS WITH DIABETIC NEPHROPATHY, WITH LONG-TERM CURRENT USE OF INSULIN (HCC): ICD-10-CM

## 2021-07-29 DIAGNOSIS — Z79.4 TYPE 2 DIABETES MELLITUS WITH DIABETIC NEPHROPATHY, WITH LONG-TERM CURRENT USE OF INSULIN (HCC): ICD-10-CM

## 2021-07-29 LAB
A/G RATIO: 1 (ref 1.1–2.2)
ALBUMIN SERPL-MCNC: 3.2 G/DL (ref 3.4–5)
ALP BLD-CCNC: 107 U/L (ref 40–129)
ALT SERPL-CCNC: 10 U/L (ref 10–40)
ANION GAP SERPL CALCULATED.3IONS-SCNC: 11 MMOL/L (ref 3–16)
AST SERPL-CCNC: 11 U/L (ref 15–37)
BASOPHILS ABSOLUTE: 0 K/UL (ref 0–0.2)
BASOPHILS RELATIVE PERCENT: 0.4 %
BILIRUB SERPL-MCNC: <0.2 MG/DL (ref 0–1)
BUN BLDV-MCNC: 37 MG/DL (ref 7–20)
CALCIUM SERPL-MCNC: 7.9 MG/DL (ref 8.3–10.6)
CHLORIDE BLD-SCNC: 95 MMOL/L (ref 99–110)
CO2: 20 MMOL/L (ref 21–32)
CREAT SERPL-MCNC: 4.5 MG/DL (ref 0.9–1.3)
EOSINOPHILS ABSOLUTE: 0 K/UL (ref 0–0.6)
EOSINOPHILS RELATIVE PERCENT: 0.1 %
GFR AFRICAN AMERICAN: 17
GFR NON-AFRICAN AMERICAN: 14
GLOBULIN: 3.2 G/DL
GLUCOSE BLD-MCNC: 186 MG/DL (ref 70–99)
GLUCOSE BLD-MCNC: 199 MG/DL (ref 70–99)
GLUCOSE BLD-MCNC: 330 MG/DL (ref 70–99)
GLUCOSE BLD-MCNC: 415 MG/DL (ref 70–99)
GLUCOSE BLD-MCNC: 450 MG/DL (ref 70–99)
HCT VFR BLD CALC: 31.2 % (ref 40.5–52.5)
HEMOGLOBIN: 10.5 G/DL (ref 13.5–17.5)
LYMPHOCYTES ABSOLUTE: 0.5 K/UL (ref 1–5.1)
LYMPHOCYTES RELATIVE PERCENT: 5.2 %
MCH RBC QN AUTO: 30.3 PG (ref 26–34)
MCHC RBC AUTO-ENTMCNC: 33.5 G/DL (ref 31–36)
MCV RBC AUTO: 90.4 FL (ref 80–100)
MONOCYTES ABSOLUTE: 0.4 K/UL (ref 0–1.3)
MONOCYTES RELATIVE PERCENT: 3.9 %
NEUTROPHILS ABSOLUTE: 8.6 K/UL (ref 1.7–7.7)
NEUTROPHILS RELATIVE PERCENT: 90.4 %
PDW BLD-RTO: 15.1 % (ref 12.4–15.4)
PERFORMED ON: ABNORMAL
PHOSPHORUS: 2.3 MG/DL (ref 2.5–4.9)
PLATELET # BLD: 240 K/UL (ref 135–450)
PMV BLD AUTO: 8.7 FL (ref 5–10.5)
POTASSIUM REFLEX MAGNESIUM: 5.5 MMOL/L (ref 3.5–5.1)
RBC # BLD: 3.45 M/UL (ref 4.2–5.9)
SODIUM BLD-SCNC: 126 MMOL/L (ref 136–145)
TOTAL PROTEIN: 6.4 G/DL (ref 6.4–8.2)
WBC # BLD: 9.5 K/UL (ref 4–11)

## 2021-07-29 PROCEDURE — 80053 COMPREHEN METABOLIC PANEL: CPT

## 2021-07-29 PROCEDURE — 94640 AIRWAY INHALATION TREATMENT: CPT

## 2021-07-29 PROCEDURE — 2060000000 HC ICU INTERMEDIATE R&B

## 2021-07-29 PROCEDURE — 6360000002 HC RX W HCPCS: Performed by: INTERNAL MEDICINE

## 2021-07-29 PROCEDURE — 2580000003 HC RX 258: Performed by: INTERNAL MEDICINE

## 2021-07-29 PROCEDURE — 84100 ASSAY OF PHOSPHORUS: CPT

## 2021-07-29 PROCEDURE — 6370000000 HC RX 637 (ALT 250 FOR IP): Performed by: INTERNAL MEDICINE

## 2021-07-29 PROCEDURE — 36415 COLL VENOUS BLD VENIPUNCTURE: CPT

## 2021-07-29 PROCEDURE — 99222 1ST HOSP IP/OBS MODERATE 55: CPT | Performed by: INTERNAL MEDICINE

## 2021-07-29 PROCEDURE — 94660 CPAP INITIATION&MGMT: CPT

## 2021-07-29 PROCEDURE — 6370000000 HC RX 637 (ALT 250 FOR IP): Performed by: REGISTERED NURSE

## 2021-07-29 PROCEDURE — 85025 COMPLETE CBC W/AUTO DIFF WBC: CPT

## 2021-07-29 RX ORDER — INSULIN GLARGINE 100 [IU]/ML
50 INJECTION, SOLUTION SUBCUTANEOUS DAILY
Status: DISCONTINUED | OUTPATIENT
Start: 2021-07-29 | End: 2021-07-30 | Stop reason: HOSPADM

## 2021-07-29 RX ORDER — INSULIN GLARGINE 100 [IU]/ML
INJECTION, SOLUTION SUBCUTANEOUS
Qty: 15 ML | Refills: 5 | Status: ON HOLD | OUTPATIENT
Start: 2021-07-29 | End: 2022-01-17 | Stop reason: SDUPTHER

## 2021-07-29 RX ORDER — POLYETHYLENE GLYCOL 3350 17 G/17G
17 POWDER, FOR SOLUTION ORAL DAILY
Status: DISCONTINUED | OUTPATIENT
Start: 2021-07-29 | End: 2021-07-30 | Stop reason: HOSPADM

## 2021-07-29 RX ORDER — SENNA AND DOCUSATE SODIUM 50; 8.6 MG/1; MG/1
2 TABLET, FILM COATED ORAL 2 TIMES DAILY
Status: DISCONTINUED | OUTPATIENT
Start: 2021-07-29 | End: 2021-07-30 | Stop reason: HOSPADM

## 2021-07-29 RX ADMIN — QUETIAPINE FUMARATE 50 MG: 25 TABLET ORAL at 20:07

## 2021-07-29 RX ADMIN — ASPIRIN 81 MG: 81 TABLET, CHEWABLE ORAL at 08:59

## 2021-07-29 RX ADMIN — GABAPENTIN 100 MG: 100 CAPSULE ORAL at 08:58

## 2021-07-29 RX ADMIN — SODIUM CHLORIDE, PRESERVATIVE FREE 10 ML: 5 INJECTION INTRAVENOUS at 09:08

## 2021-07-29 RX ADMIN — OXYCODONE AND ACETAMINOPHEN 1 TABLET: 5; 325 TABLET ORAL at 09:07

## 2021-07-29 RX ADMIN — GABAPENTIN 100 MG: 100 CAPSULE ORAL at 13:41

## 2021-07-29 RX ADMIN — DOCUSATE SODIUM 50 MG AND SENNOSIDES 8.6 MG 2 TABLET: 8.6; 5 TABLET, FILM COATED ORAL at 20:07

## 2021-07-29 RX ADMIN — HEPARIN SODIUM 5000 UNITS: 5000 INJECTION INTRAVENOUS; SUBCUTANEOUS at 05:26

## 2021-07-29 RX ADMIN — OXYCODONE AND ACETAMINOPHEN 1 TABLET: 5; 325 TABLET ORAL at 13:41

## 2021-07-29 RX ADMIN — ONDANSETRON 4 MG: 2 INJECTION INTRAMUSCULAR; INTRAVENOUS at 13:39

## 2021-07-29 RX ADMIN — PREDNISONE 40 MG: 20 TABLET ORAL at 08:58

## 2021-07-29 RX ADMIN — GABAPENTIN 100 MG: 100 CAPSULE ORAL at 20:06

## 2021-07-29 RX ADMIN — TIOTROPIUM BROMIDE AND OLODATEROL 2 PUFF: 3.124; 2.736 SPRAY, METERED RESPIRATORY (INHALATION) at 08:30

## 2021-07-29 RX ADMIN — INSULIN LISPRO 10 UNITS: 100 INJECTION, SOLUTION INTRAVENOUS; SUBCUTANEOUS at 12:44

## 2021-07-29 RX ADMIN — POLYETHYLENE GLYCOL 3350 17 G: 17 POWDER, FOR SOLUTION ORAL at 15:56

## 2021-07-29 RX ADMIN — HEPARIN SODIUM 5000 UNITS: 5000 INJECTION INTRAVENOUS; SUBCUTANEOUS at 20:07

## 2021-07-29 RX ADMIN — INSULIN LISPRO 10 UNITS: 100 INJECTION, SOLUTION INTRAVENOUS; SUBCUTANEOUS at 17:26

## 2021-07-29 RX ADMIN — PANTOPRAZOLE SODIUM 40 MG: 40 TABLET, DELAYED RELEASE ORAL at 05:26

## 2021-07-29 RX ADMIN — SODIUM CHLORIDE, PRESERVATIVE FREE 10 ML: 5 INJECTION INTRAVENOUS at 22:18

## 2021-07-29 RX ADMIN — ISOSORBIDE MONONITRATE 30 MG: 30 TABLET, EXTENDED RELEASE ORAL at 08:58

## 2021-07-29 RX ADMIN — LOSARTAN POTASSIUM 50 MG: 25 TABLET, FILM COATED ORAL at 08:58

## 2021-07-29 RX ADMIN — ACETAMINOPHEN 650 MG: 325 TABLET ORAL at 20:14

## 2021-07-29 RX ADMIN — INSULIN GLARGINE 35 UNITS: 100 INJECTION, SOLUTION SUBCUTANEOUS at 09:00

## 2021-07-29 RX ADMIN — INSULIN LISPRO 12 UNITS: 100 INJECTION, SOLUTION INTRAVENOUS; SUBCUTANEOUS at 12:45

## 2021-07-29 RX ADMIN — INSULIN LISPRO 12 UNITS: 100 INJECTION, SOLUTION INTRAVENOUS; SUBCUTANEOUS at 09:04

## 2021-07-29 RX ADMIN — OXYCODONE AND ACETAMINOPHEN 1 TABLET: 5; 325 TABLET ORAL at 23:53

## 2021-07-29 RX ADMIN — INSULIN LISPRO 2 UNITS: 100 INJECTION, SOLUTION INTRAVENOUS; SUBCUTANEOUS at 21:31

## 2021-07-29 RX ADMIN — CALCIUM CARBONATE 500 MG: 500 TABLET, CHEWABLE ORAL at 01:58

## 2021-07-29 RX ADMIN — INSULIN LISPRO 3 UNITS: 100 INJECTION, SOLUTION INTRAVENOUS; SUBCUTANEOUS at 17:26

## 2021-07-29 RX ADMIN — OXYCODONE AND ACETAMINOPHEN 1 TABLET: 5; 325 TABLET ORAL at 17:25

## 2021-07-29 RX ADMIN — OXYCODONE AND ACETAMINOPHEN 1 TABLET: 5; 325 TABLET ORAL at 03:43

## 2021-07-29 RX ADMIN — HEPARIN SODIUM 5000 UNITS: 5000 INJECTION INTRAVENOUS; SUBCUTANEOUS at 13:42

## 2021-07-29 RX ADMIN — PRAVASTATIN SODIUM 40 MG: 40 TABLET ORAL at 20:07

## 2021-07-29 ASSESSMENT — PAIN SCALES - GENERAL
PAINLEVEL_OUTOF10: 10
PAINLEVEL_OUTOF10: 8
PAINLEVEL_OUTOF10: 7
PAINLEVEL_OUTOF10: 0
PAINLEVEL_OUTOF10: 8
PAINLEVEL_OUTOF10: 3
PAINLEVEL_OUTOF10: 7
PAINLEVEL_OUTOF10: 9
PAINLEVEL_OUTOF10: 7
PAINLEVEL_OUTOF10: 7

## 2021-07-29 NOTE — PROGRESS NOTES
Pt having heartburn gave him one tums and he had one a couple of hours ago and is still not getting any relief.

## 2021-07-29 NOTE — PROGRESS NOTES
07/29/21 0012   NIV Type   Skin Assessment Clean, dry, & intact   NIV Started/Stopped On   Equipment Type V60   Mode CPAP   Mask Type Full face mask   Mask Size Large   Settings/Measurements   CPAP/EPAP 12 cmH2O   Resp 18   FiO2  30 %   Vt Exhaled 430 mL   Minute Volume 7.4 Liters   Mask Leak (lpm) 78 lpm   Comfort Level Good   Using Accessory Muscles No   SpO2 97   Breath Sounds   Right Upper Lobe Clear   Right Middle Lobe Diminished   Right Lower Lobe Diminished   Left Upper Lobe Clear   Left Lower Lobe Diminished   Alarm Settings   Alarms On Y

## 2021-07-29 NOTE — PROGRESS NOTES
Physician Progress Note      PATIENT:               Sincere Cool  CSN #:                  [id number]  :                       1970  ADMIT DATE:       2021 1:38 AM  DISCH DATE:  RESPONDING  PROVIDER #:        Gerardo Hu CNP          QUERY TEXT:    H&P notes- \"Fluid overload likely due to ESRD and medical non-compliance with   HD\"  ED consult notes- \"markedly elevated BNP and chest x-ray concerning for   CHF. If possible, please document in progress notes and discharge summary:      The medical record reflects the following:  Risk Factors: ESRD noncompliance, Chronic diastolic CHF, acute hypoxic   respiratory failure, morbid obesity, DM  Clinical Indicators: ProBMP>70,000, PN-- CXR with findings of pulmonary   vascular congestion. CXR-Mild central pulmonary vascular congestion with mild   interstitial pulmonary edema. Treatment: HD, Nephrology consult, cxr, I&)'s, daily weights, fluid removal   with HD, on MWF schedule , monitor pt on telemetry, oxygen 3l per NC    Thank-You, Carlyn Turner RN, BSN, CCDS  Options provided:  -- Acute on chronic diastolic CHF confirmed present on admission  -- No Acute component of CHF, Chronic diastolic CHF POA  -- Other - I will add my own diagnosis  -- Disagree - Not applicable / Not valid  -- Disagree - Clinically unable to determine / Unknown  -- Refer to Clinical Documentation Reviewer    PROVIDER RESPONSE TEXT:    NO Acute component of CHF, Chronic Diastolic CHF POA .     Query created by: Gucci Cronin on 2021 8:03 PM      Electronically signed by:  Gerardo Hu CNP 2021 8:47 AM

## 2021-07-29 NOTE — CARE COORDINATION
CASE MANAGEMENT INITIAL ASSESSMENT      Reviewed chart and completed assessment with: patient   Explained Case Management role/services. Health Care Decision Maker :   Primary Decision Maker: Crystal Ann - Spouse - [phone]          Can this person be reached and be able to respond quickly, such as within a few minutes or hours? Yes    Admit date/status: 7/27/21 Inpatient   Diagnosis: acute respiratory failure    Is this a Readmission?:  No      Insurance: [place] required for SNF: Yes       3 night stay required: No    Living arrangements, Adls, care needs, prior to admission: lives in a mobile home with his wife     Transportation: bus      [address] at home:  Walker_X_Cane__RTS__ BSC__Shower Chair__  02__ HHN_X_ CPAP_X_  BiPap__  Hospital Bed__ W/C___ Other_scooter_________    Services in the home and/or outpatient, prior to admission: none    Dialysis Facility (if applicable)   · Name: [place]   · Address:  · Dialysis Schedule: [facility]  · Phone:  · Fax:    PT/OT recs:none    Hospital Exemption Notification (HEN): not initiated     Barriers to discharge: none    Plan/comments: Patient is independent at home with the exception of driving. He states he takes the bus because he has back problems that the weakness radiates to his legs making it hard for him to drive. He states he was active with home care but he ran out of visits. Will continue to follow and watch for needs.       [facility] on chart for MD signature

## 2021-07-29 NOTE — PROGRESS NOTES
NEPHROLOGY PROGRESS  NOTE    CC: ESRD    HPI: admitted with SOB    SUBJECTIVE    SOB better but C/O gastric upset every time he eats        SOC: no visitors        Scheduled Meds:   insulin lispro  0-18 Units Subcutaneous TID WC    insulin lispro  0-9 Units Subcutaneous Nightly    insulin lispro  10 Units Subcutaneous TID WC    insulin glargine  50 Units Subcutaneous Daily    predniSONE  40 mg Oral Daily    sodium chloride flush  10 mL Intravenous 2 times per day    heparin (porcine)  5,000 Units Subcutaneous 3 times per day    epoetin siddharth-epbx  3,400 Units Intravenous Q MWF    doxercalciferol  2 mcg Intravenous Q MWF    tiotropium-olodaterol  2 puff Inhalation Daily    aspirin  81 mg Oral Daily    pravastatin  40 mg Oral Nightly    losartan  50 mg Oral Daily    pantoprazole  40 mg Oral QAM AC    isosorbide mononitrate  30 mg Oral Daily    QUEtiapine  50 mg Oral Nightly    gabapentin  100 mg Oral TID     Continuous Infusions:   sodium chloride      dextrose       PRN Meds:calcium carbonate, sodium chloride flush, sodium chloride, promethazine **OR** ondansetron, acetaminophen **OR** acetaminophen, glucose, dextrose, glucagon (rDNA), dextrose, heparin (porcine), heparin (porcine), ipratropium-albuterol, albuterol sulfate HFA, oxyCODONE-acetaminophen      Objective:      Physical Exam  Wt Readings from Last 3 Encounters:   07/29/21 260 lb 9.3 oz (118.2 kg)   06/29/21 255 lb 9.6 oz (115.9 kg)   04/26/21 271 lb 6.2 oz (123.1 kg)     Temp Readings from Last 3 Encounters:   07/29/21 98.2 °F (36.8 °C) (Oral)   06/29/21 97.6 °F (36.4 °C) (Oral)   04/27/21 98.6 °F (37 °C) (Oral)     BP Readings from Last 3 Encounters:   07/29/21 (!) 171/93   06/29/21 (!) 110/55   04/27/21 (!) 151/95     Pulse Readings from Last 3 Encounters:   07/29/21 95   06/29/21 89   04/27/21 87     General appearance:  NAD. Respiratory:  Normal respiratory effort.  Clear to auscultation, bilaterally without Rales/Wheezes/Rhonchi. Cardiovascular:  Regular rate and rhythm with normal S1/S2 without murmurs, rubs or gallops. Abdomen: Soft, non-tender, non-distended with normal bowel sounds. Musculoskeletal:  No clubbing, cyanosis or edema bilaterally.    Skin: Skin color, texture, turgor normal.  No rashes or lesions.   Neurologic:  grossly non-focal.           Lab Review   Lab Results   Component Value Date    WBC 9.5 07/29/2021    HGB 10.5 (L) 07/29/2021    HCT 31.2 (L) 07/29/2021    MCV 90.4 07/29/2021     07/29/2021     Lab Results   Component Value Date     07/29/2021    K 5.5 07/29/2021    CL 95 07/29/2021    CO2 20 07/29/2021    BUN 37 07/29/2021    CREATININE 4.5 07/29/2021    GLUCOSE 450 07/29/2021    CALCIUM 7.9 07/29/2021            Patient Active Problem List    Diagnosis Date Noted    Hypotension due to drugs 11/25/2017    Acute on chronic combined systolic and diastolic heart failure (HCC)     Ischemic cardiomyopathy     Elevated brain natriuretic peptide (BNP) level     Dyslipidemia     Type 2 diabetes, uncontrolled, with neuropathy ([address] 03/04/2014    Bicuspid aortic valve 06/03/2013    Chronic dCHF (grade 2 LVDD) 05/14/2013    CAD (coronary artery disease) 05/14/2013    PVD (peripheral vascular disease) ([address] 05/14/2013    S/P TAVR (transcatheter aortic valve replacement) 10/19/2019    Chest pain     Essential hypertension 11/14/2013    Chronic obstructive pulmonary disease ([address] 05/14/2013    Type 2 diabetes mellitus with hyperglycemia ([address] 06/27/2021    Acute encephalopathy 06/27/2021    Cellulitis and abscess of hand 04/24/2021    Iliac artery occlusion, right (HCC)     Cellulitis of right foot 01/29/2021    Peripheral vascular occlusive disease ([address] 01/02/2021    Ataxia     Weakness of both lower extremities 12/30/2020    Sinus bradycardia 12/30/2020    Sinus pause     GBS (Guillain-Ordway syndrome) ([facility])     Bilateral leg weakness 12/04/2020    DKA, type 1, not at goal [facility]) 04/25/2020    Generalized weakness 02/04/2020    Former smoker 11/19/2019    Bradycardia 10/19/2019    Syncope and collapse 10/19/2019    Atrial fibrillation ([facility])     Hypercholesteremia 07/30/2019    Chronic bronchitis ([facility]) 05/21/2019    Nasal congestion 05/21/2019    Chronic, continuous use of opioids 04/15/2019    Steal syndrome of dialysis vascular access ([facility])     SOB (shortness of breath) 08/95/0827    Diastolic dysfunction 52/50/4329    Anemia of chronic disease 11/12/2018    Acute respiratory failure ([facility]) 11/09/2018    Pulmonary edema 11/09/2018    Fluid overload 11/09/2018    Renovascular hypertension     Mixed hyperlipidemia     Cigarette nicotine dependence in remission     Acute combined systolic and diastolic CHF, NYHA class 4 ([facility]) 09/14/2018    Neuromuscular disorder ([facility])     Acute diastolic CHF (congestive heart failure) ([facility]) 03/18/2018    Hemodialysis-associated hypotension 11/22/2017    Left arm pain 11/22/2017    Dizziness 11/22/2017    ESRD (end stage renal disease) on dialysis ([facility]) 11/22/2017    Mucus plugging of bronchi     Hypervolemia     Hyperkalemia     Nonrheumatic aortic valve stenosis     Pleural effusion     Chronic anemia     Diarrhea 08/04/2017    Arterial ischemic stroke, ICA, right, acute ([facility])     Type 2 diabetes mellitus without complication, without long-term current use of insulin ([facility])     HTN (hypertension), benign     ZAINAB (obstructive sleep apnea)     Tobacco abuse 05/21/2017    CKD stage 4 due to type 2 diabetes mellitus ([facility]) 05/21/2017    CVA (cerebral vascular accident) ([facility]) 05/21/2017    Chronic renal failure, stage 5 ([facility]) 03/07/2017    Chest pressure 02/17/2017    Hypertensive urgency 02/17/2017    Hypoxia     Respiratory distress     Angina, class IV ([facility])     Dyspnea     Gastroparesis due to DM ([facility])     Biliary colic 08/90/6936    Symptomatic cholelithiasis 01/04/2017    Degeneration of lumbar or lumbosacral intervertebral disc 03/18/2016    Lumbar radiculopathy 03/18/2016    Lumbosacral spondylosis without myelopathy 03/18/2016    ZAINAB on CPAP 02/11/2016    Coronary artery disease involving native coronary artery of native heart without angina pectoris     Obesity (BMI 30-39. 9)     CHF exacerbation ([address] 05/24/2015    Hypertensive heart disease with congestive heart failure with preserved left ventricular function ([address] 04/24/2015    Pneumonia of right upper lobe due to infectious organism 03/28/2015    DM (diabetes mellitus), secondary, uncontrolled, w/neurologic complic ([address] 11/79/4027    Hematoma 03/17/2015    Depression with anxiety 06/05/2014    Passive smoke exposure 05/30/2014    Diabetic neuropathy ([address] 11/14/2013    Claudication in peripheral vascular disease ([address] 06/26/2013    Sleep apnea 06/03/2013    Bilateral hilar adenopathy syndrome 06/03/2013    Nonischemic cardiomyopathy ([address] 05/22/2013    Acute respiratory failure with hypoxia and hypercapnia ([address] 05/14/2013       ASSESSMENT AND PLAN     1. ESRD  -HD MWF at [facility]  -decrease the DW to ~115kg, as tolerated     2. SOB-  -challenge DW   3. Anemia- Hgb at  [facility]  4. Nausea- per primary team

## 2021-07-29 NOTE — PROGRESS NOTES
07/28/21 2226   NIV Type   Skin Assessment Clean, dry, & intact   NIV Started/Stopped On   Equipment Type v60   Mode CPAP   Mask Type Full face mask   Mask Size Large   Bonnet size Large   Settings/Measurements   CPAP/EPAP 12 cmH2O   Resp 19   FiO2  30 %   Vt Exhaled 436 mL   Minute Volume 8.1 Liters   Mask Leak (lpm) 80 lpm   Comfort Level Good   Using Accessory Muscles No   Alarm Settings   Alarms On Y   Press Low Alarm 6 cmH2O   High Pressure Alarm 30 cmH2O   Apnea (secs) 20 secs   Resp Rate Low Alarm 6   High Respiratory Rate 40 br/min   Oxygen Therapy/Pulse Ox   O2 Therapy Oxygen   O2 Device PAP (positive airway pressure)   SpO2 98 %

## 2021-07-29 NOTE — PLAN OF CARE
Problem: Skin Integrity:  Goal: Will show no infection signs and symptoms  Description: Will show no infection signs and symptoms  Outcome: Ongoing     Problem: Pain:  Goal: Pain level will decrease  Description: Pain level will decrease  Outcome: Ongoing     Problem: Falls - Risk of:  Goal: Will remain free from falls  Description: Will remain free from falls  Outcome: Ongoing

## 2021-07-29 NOTE — PROGRESS NOTES
Hospitalist Progress Note      PCP: Arya Tobin MD    Date of Admission: 7/27/2021    Chief Complaint: SOB    Hospital Course:   46 y.o. male with multiple co-morbidities who presented to [facility] with complaints of SOB that started yesterday. Pt skipped his dialysis yesterday. He reports BLE swelling. No cough/sputum production. No chest pain. No N/V/D. Reportedly pt had low O2 sats in the 70-80s per EMS report. He was placed on BiPAP and transported to the hospital. He is currently on 3 LPM. He does not wear O2 at home. CXR with findings of pulmonary vascular congestion. Nephrology consulted. Pt is currently undergoing HD when seen, comfortable. Subjective:   Pt is on RA. Afebrile. VSS. +orthopnea. No chest pain. +nausea, no vomiting or diarrhea.      Medications:  Reviewed    Infusion Medications    sodium chloride      dextrose       Scheduled Medications    insulin lispro  0-18 Units Subcutaneous TID WC    insulin lispro  0-9 Units Subcutaneous Nightly    insulin lispro  10 Units Subcutaneous TID WC    insulin glargine  50 Units Subcutaneous Daily    predniSONE  40 mg Oral Daily    sodium chloride flush  10 mL Intravenous 2 times per day    heparin (porcine)  5,000 Units Subcutaneous 3 times per day    epoetin siddharth-epbx  3,400 Units Intravenous Q MWF    doxercalciferol  2 mcg Intravenous Q MWF    tiotropium-olodaterol  2 puff Inhalation Daily    aspirin  81 mg Oral Daily    pravastatin  40 mg Oral Nightly    losartan  50 mg Oral Daily    pantoprazole  40 mg Oral QAM AC    isosorbide mononitrate  30 mg Oral Daily    QUEtiapine  50 mg Oral Nightly    gabapentin  100 mg Oral TID     PRN Meds: calcium carbonate, sodium chloride flush, sodium chloride, promethazine **OR** ondansetron, acetaminophen **OR** acetaminophen, glucose, dextrose, glucagon (rDNA), dextrose, heparin (porcine), heparin (porcine), ipratropium-albuterol, albuterol sulfate HFA, oxyCODONE-acetaminophen      Intake/Output Summary (Last 24 hours) at 7/29/2021 0851  Last data filed at 7/29/2021 0630  Gross per 24 hour   Intake 1620 ml   Output 5800 ml   Net -4180 ml       Physical Exam Performed:    BP (!) 151/78   Pulse 91   Temp 98.2 °F (36.8 °C) (Oral)   Resp 19   Ht 5' 9\" (1.753 m) Comment: pt. states  Wt 260 lb 9.3 oz (118.2 kg)   SpO2 95%   BMI 38.48 kg/m²     General appearance: No apparent distress, appears stated age and cooperative. HEENT: Pupils equal, round, and reactive to light. Conjunctivae/corneas clear. Neck: Supple, with full range of motion. No jugular venous distention. Trachea midline. Respiratory:  Normal respiratory effort. Clear to auscultation, bilaterally without Rales/Wheezes/Rhonchi. Cardiovascular: Regular rate and rhythm with normal S1/S2 without murmurs, rubs or gallops. Abdomen: Soft, non-tender, non-distended with normal bowel sounds. Musculoskeletal: No clubbing, cyanosis or edema bilaterally. Full range of motion without deformity. Skin: Skin color, texture, turgor normal.  No rashes or lesions. Neurologic:  Neurovascularly intact without any focal sensory/motor deficits.  Cranial nerves: II-XII intact, grossly non-focal.  Psychiatric: Alert and oriented, thought content appropriate, normal insight  Capillary Refill: Brisk,3 seconds, normal   Peripheral Pulses: +2 palpable, equal bilaterally       Labs:   Recent Labs     07/27/21  0511 07/28/21  0500 07/29/21  0544   WBC 12.4* 8.5 9.5   HGB 10.7* 10.6* 10.5*   HCT 32.0* 30.9* 31.2*    244 240     Recent Labs     07/27/21  0511 07/28/21  0500 07/29/21  0543   * 133* 126*   K 4.7 4.4 5.5*   CL 98* 99 95*   CO2 22 24 20*   BUN 45* 37* 37*   CREATININE 6.3* 5.3* 4.5*   CALCIUM 9.2 7.9* 7.9*   PHOS  --   --  2.3*     Recent Labs     07/27/21  0511 07/28/21  0500 07/29/21  0543   AST 12* 11* 11*   ALT 11 9* 10   BILITOT <0.2 <0.2 <0.2   ALKPHOS 114 87 107     Recent Labs 07/27/21  0142   INR 0.95     Recent Labs     07/27/21  0142 07/27/21  0511 07/28/21  0500   TROPONINI 0.35* 0.33* 0.30*       Urinalysis:      Lab Results   Component Value Date    NITRU Negative 06/26/2021    WBCUA 6-9 06/26/2021    BACTERIA Rare 06/26/2021    RBCUA 11-20 06/26/2021    BLOODU SMALL 06/26/2021    SPECGRAV 1.015 06/26/2021    GLUCOSEU >=1000 06/26/2021       Radiology:  XR CHEST PORTABLE   Final Result   Findings suggesting mild central pulmonary vascular congestion/interstitial   pulmonary edema. Assessment/Plan:    Active Hospital Problems    Diagnosis     Acute respiratory failure ([facility] [J96.00]        Dyspnea likely due to fluid overload in setting of ESRD / medical non-compliance with HD  - Nephrology consulted, fluid removal with HD, on MWF schedule  - Cardiology consulted given lack of improvement with pt's symptoms including dyspnea/orthopnea. Obtain updated ECHO.      Chronic troponin elevation   - EKG non-acute  - no complaints of angina  - flat trend not consistent with ACS  - likely secondary to reduced renal clearance   - monitor pt on telemetry     DMII  - poorly controlled, A1C 10  - continue basal bolus insulin regimen -- monitor and adjust as needed, add prandial insulin  - carb-control diet      CAD, AS s/p TAVR  - no active chest pain  - continue ASA, statin, imdur     HTN  - poorly controlled  - continue home losartan, cardizem, hydralazine     HLD  - continue home statin     COPD  - with possible exacerbation   - continue home inhalers  - added PO prednisone 40 mg daily      Anemia  - 2/2 ESRD  - at baseline     ZAINAB  - CPAP QHS and during the day with naps      Morbid Obesity  - With Body mass index is 32.98 kg/m². Complicating assessment and treatment. Placing patient at risk for multiple co-morbidities as well as early death and contributing to the patient's presentation. Counseled on weight loss.       DVT Prophylaxis: SQ heparin   Diet: ADULT DIET;  Regular; 5 carb choices (75 gm/meal)  Code Status: Full Code    PT/OT Eval Status: ordered     Dispo - 1-2 days     JAY Garduno - CNP

## 2021-07-29 NOTE — FLOWSHEET NOTE
07/28/21 2100   Assessment   Charting Type Shift assessment   Neurological   Neuro (WDL) WDL   Level of Consciousness Alert (0)   Orientation Level Oriented X4   Cognition Appropriate judgement; Appropriate safety awareness; Follows commands   HEENT   HEENT (WDL) X   Right Eye Impaired vision   Left Eye Impaired vision   Teeth Missing teeth   Respiratory   Respiratory (WDL) X   Respiratory Pattern Regular   Respiratory Depth Normal   Respiratory Quality/Effort Unlabored   Chest Assessment Chest expansion symmetrical;Trachea midline   L Breath Sounds Clear;Diminished   R Breath Sounds Clear;Diminished   Breath Sounds   Right Upper Lobe Clear   Right Middle Lobe Diminished   Right Lower Lobe Diminished   Left Upper Lobe Clear   Left Lower Lobe Diminished   Cardiac   Cardiac (WDL) X   Cardiac Regularity Regular   Heart Sounds S1, S2   Cardiac Rhythm NSR;ST   Cardiac Monitor   Telemetry Monitor On Yes   Telemetry Audible Yes   Telemetry Alarms Set Yes   Gastrointestinal   Abdominal (WDL) X   RUQ Bowel Sounds Active   LUQ Bowel Sounds Active   RLQ Bowel Sounds Active   LLQ Bowel Sounds Active   Abdomen Inspection Rounded;Rotund   Tenderness Soft;Nontender   Peripheral Vascular   Peripheral Vascular (WDL) X   Edema Generalized   Edema Generalized Non-pitting;+1   RUE Neurovascular Assessment   Capillary Refill Less than/equal to 3 seconds   Color Pale   Temperature Warm   R Radial Pulse +2   LUE Neurovascular Assessment   Capillary Refill Greater than 3 seconds   Color Pale   Temperature Warm   L Radial Pulse +1   RLE Neurovascular Assessment   Capillary Refill Less than/equal to 3 seconds   Color Pale   Temperature Cool   R Pedal Pulse +1   LLE Neurovascular Assessment   Capillary Refill Less than/equal to 3 seconds   Color Pale   Temperature Cool   L Pedal Pulse +1   Skin Color/Condition   Skin Color/Condition (WDL) WDL   Skin Integrity   Skin Integrity (WDL) X   Musculoskeletal   Musculoskeletal (WDL) WDL   RUE Full movement   LUE Full movement   RL Extremity Swelling;Limited movement   LL Extremity Swelling;Limited movement   Genitourinary   Genitourinary (WDL) WDL  (ESRD)   Anus/Rectum   Anus/Rectum (WDL) WDL   Psychosocial   Psychosocial (WDL) X   Patient Behaviors Cooperative; Appropriate for age   Needs Expressed Physical   Rest/Sleep for Patient Poor

## 2021-07-29 NOTE — CONSULTS
[address] [facility]   Cardiovascular Evaluation    PATIENT: Steven Gomez  DATE: 2021  MRN: [id number]  CSN: [id number]  : 1970    Primary Care Doctor/Referring provider: Rodríguez Carranza MD, Emi Carrillo DO     Reason for evaluation/Chief complaint:   Shortness of Breath (Patient arrived via EMs after waking up [facility]. Patient missed Dialysis this am. Patient was 74 on room air upon EMS arrival. Patient on CPAP upon arrival. )    Subjective:    History of present illness on initial date of evaluation:   Steven Gomez is a 46 y.o. patient who presents for the evaluation of shortness of breath. Patient has an extensive history of nonischemic cardiomyopathy in conjunction with prior aortic valvular disease. Patient also has end-stage renal disease and is on hemodialysis. Patient states that he developed shortness of breath for the past several days. Patient states that the shortness of breath was worse when he would try to lay flat. Patient states that he cannot catch his breath and is very limited capability from exercise tolerance. This is all progressed over the past several days. Of note, the patient was noncompliant and missed a dialysis earlier this week. States that since his dialysis time these symptoms had gotten worse. He did cut his dialysis and had extra fluid taken off but does not feel that he is back to his baseline.       Patient Active Problem List   Diagnosis    Acute respiratory failure with hypoxia and hypercapnia (HCC)    Chronic dCHF (grade 2 LVDD)    Chronic obstructive pulmonary disease ([address]    CAD (coronary artery disease)    PVD (peripheral vascular disease) ([address]    Nonischemic cardiomyopathy ([address]    Sleep apnea    Bicuspid aortic valve    Bilateral hilar adenopathy syndrome    Claudication in peripheral vascular disease ([address]    Essential hypertension    Diabetic neuropathy (HCC)    Type 2 diabetes, uncontrolled, with neuropathy ([address]    Passive smoke exposure    Depression with anxiety    Hematoma    Pneumonia of right upper lobe due to infectious organism    DM (diabetes mellitus), secondary, uncontrolled, w/neurologic complic ([address]    Hypertensive heart disease with congestive heart failure with preserved left ventricular function ([address]    CHF exacerbation ([address]    Chest pain    Coronary artery disease involving native coronary artery of native heart without angina pectoris    Obesity (BMI 30-39. 9)    ZAINAB on CPAP    Degeneration of lumbar or lumbosacral intervertebral disc    Lumbar radiculopathy    Lumbosacral spondylosis without myelopathy    Biliary colic    Symptomatic cholelithiasis    Gastroparesis due to DM ([facility])    Angina, class IV ([facility])    Dyspnea    Elevated brain natriuretic peptide (BNP) level    Dyslipidemia    Respiratory distress    Hypoxia    Chest pressure    Hypertensive urgency    Acute on chronic combined systolic and diastolic heart failure ([facility])    Ischemic cardiomyopathy    Chronic renal failure, stage 5 ([facility])    Tobacco abuse    CKD stage 4 due to type 2 diabetes mellitus ([facility])    CVA (cerebral vascular accident) ([address]    Arterial ischemic stroke, ICA, right, acute ([address]    Type 2 diabetes mellitus without complication, without long-term current use of insulin ([address]    HTN (hypertension), benign    ZAINAB (obstructive sleep apnea)    Diarrhea    Pleural effusion    Chronic anemia    Nonrheumatic aortic valve stenosis    Hypervolemia    Hyperkalemia    Mucus plugging of bronchi    Hemodialysis-associated hypotension    Left arm pain    Dizziness    ESRD (end stage renal disease) on dialysis ([facility])    Hypotension due to drugs    Acute diastolic CHF (congestive heart failure) ([facility])    Neuromuscular disorder (HCC)    Acute combined systolic and diastolic CHF, NYHA class 4 (HCC)    Renovascular hypertension    Mixed hyperlipidemia    Cigarette nicotine dependence in remission    Acute respiratory failure (HCC)    Pulmonary edema    Fluid overload    Diastolic dysfunction    Anemia of chronic disease    SOB (shortness of breath)    Steal syndrome of dialysis vascular access ([facility])    Chronic, continuous use of opioids    Chronic bronchitis ([facility])    Nasal congestion    Hypercholesteremia    Bradycardia    S/P TAVR (transcatheter aortic valve replacement)    Syncope and collapse    Atrial fibrillation ([address]    Former smoker    Generalized weakness    DKA, type 1, not at goal [facility])    Bilateral leg weakness    GBS (Guillain-[place]and[place] syndrome) ([facility])    Sinus pause    Weakness of both lower extremities    Sinus bradycardia    Ataxia    Peripheral vascular occlusive disease ([facility])    Cellulitis of right foot    Iliac artery occlusion, right ([facility])    Cellulitis and abscess of hand    Type 2 diabetes mellitus with hyperglycemia ([facility])    Acute encephalopathy         Cardiac Testing: I have reviewed the findings below.   EKG:  ECHO:   STRESS TEST:  CATH:  BYPASS:  VASCULAR:    Past Medical History:   has a past medical history of Ambulatory dysfunction, Aortic stenosis, Arthritis, Asthma, Bilateral hilar adenopathy syndrome, CAD (coronary artery disease), Cardiomyopathy ([address] CHF (congestive heart failure) ([address] Chronic pain, COPD (chronic obstructive pulmonary disease) ([address] Depression, Diabetes mellitus ([address] Difficult intravenous access, Emphysema of lung ([address] ESRD (end stage renal disease) on dialysis ([address] Fear of needles, Gastric ulcer, GERD (gastroesophageal reflux disease), Heart valve problem, Hemodialysis patient ([address] History of spinal fracture, Hx of blood clots, Hyperlipidemia, Hypertension, MI (myocardial infarction) ([address] Neuromuscular disorder ([address] Numbness and tingling in left arm, Pneumonia, PONV (postoperative nausea and vomiting), Prolonged emergence from general anesthesia, Sleep apnea, Stroke ([address] TIA (transient ischemic attack), and Unspecified diseases of blood and blood-forming organs. Surgical History:   has a past surgical history that includes Tonsillectomy; cyst removal (08/14/2013); Colonoscopy; Coronary angioplasty with stent (05/26/15); vascular surgery (aprx 2 years ago); Colonoscopy (2/29/2015); Upper gastrointestinal endoscopy (01/06/2016); Upper gastrointestinal endoscopy (01/29/2017); other surgical history (02/01/2017); Dialysis fistula creation (Left, 10/30/2017); Diagnostic Cardiac Cath Lab Procedure; other surgical history (2018); other surgical history (Bilateral, 06/26/2018); pr lap insertion tunneled intraperitoneal catheter (N/A, 9/21/2018); other surgical history (Right, 09/2018); Dialysis fistula creation (Left, 3/27/2019); other surgical history (05/28/2019); Endoscopy, colon, diagnostic; Catheter Removal (Right, 7/2/2019); and Aortic valve replacement (N/A, 10/15/2019). Social History:   reports that he has been smoking cigarettes. He has a 16.50 pack-year smoking history. He has never used smokeless tobacco. He reports previous alcohol use. He reports that he does not use drugs. Family History:  No evidence for sudden cardiac death or premature CAD    Medications:  Reviewed and are listed in nursing record. and/or listed below  Outpatient Medications:  Prior to Admission medications    Medication Sig Start Date End Date Taking? Authorizing Provider   oxyCODONE-acetaminophen (PERCOCET) 7.5-325 MG per tablet Take 1 tablet by mouth every 4-6 hours as needed for Pain for up to 30 days.  7/22/21 8/21/21 Yes Rod Nickerson MD   hydrocortisone (ANUSOL-HC) 2.5 % CREA rectal cream Place rectally 2 times daily 6/9/21  Yes Rod Nickerson MD   albuterol (PROVENTIL) (2.5 MG/3ML) 0.083% nebulizer solution INHALE 1 VIAL VIA NEBULIZER EVERY 6 HOURS AS NEEDED FOR WHEEZING 6/2/21  Yes Rod Nickerson MD   famotidine (PEPCID) 20 MG tablet TAKE 1 TABLET BY MOUTH TWICE DAILY AS NEEDED FOR HEARTBURN 5/28/21  Yes Rod Nickerson MD   hydrOXYzine (VISTARIL) 50 MG capsule TAKE 1 TO 2 CAPSULES BY MOUTH NIGHTLY 5/26/21  Yes Arya Tobin MD   LINZESS 145 MCG capsule TAKE 1 CAPSULE BY MOUTH EVERY MORNING BEFORE BREAKFAST 5/25/21  Yes Arya Tobin MD   hydrALAZINE (APRESOLINE) 50 MG tablet TAKE 1/2 TABLET BY MOUTH EVERY 8 HOURS 5/24/21  Yes Arya Tobin MD   traZODone (DESYREL) 150 MG tablet TAKE (1) TABLET BY MOUTH NIGHTLY 5/7/21  Yes JAY Solano - ILAN   dilTIAZem (CARDIZEM CD) 180 MG extended release capsule  4/15/21  Yes Gerson Velazquez MD   cyclobenzaprine (FLEXERIL) 10 MG tablet TAKE 1 TABLET BY MOUTH EVERY 8 HOURS AS NEEDED 5/3/21  Yes Arya Tobin MD   DULoxetine (CYMBALTA) 30 MG extended release capsule TAKE 1 CAPSULE BY MOUTH EVERY DAY 4/27/21  Yes Arya Tobin MD   tiZANidine (ZANAFLEX) 4 MG tablet TAKE 1 TABLET BY MOUTH THREE TIMES DAILY 4/27/21  Yes Arya Tobin MD   pravastatin (PRAVACHOL) 40 MG tablet Take 1 tablet by mouth nightly 4/27/21  Yes JAY Starks CNP   QUEtiapine (SEROQUEL) 50 MG tablet Take 1 tablet by mouth every evening 3/25/21  Yes Arya Tobin MD   losartan (COZAAR) 50 MG tablet TAKE 1 TABLET BY MOUTH DAILY 3/25/21  Yes Arya Tobin MD   pantoprazole (PROTONIX) 40 MG tablet TAKE (1) TABLET BY MOUTH EACH MORNING BEFORE BREAKFAST 3/9/21  Yes Arya Tobin MD   simethicone (MYLICON) 80 MG chewable tablet Take 1 tablet by mouth every 6 hours as needed (cramping) 2/3/21  Yes George Sepulveda MD   nitroGLYCERIN (NITROSTAT) 0.4 MG SL tablet DISSOLVE 1 TABLET UNDER THE TONGUE AS NEEDED FOR CHEST PAIN EVERY 5 MINUTES UP TO 3 TIMES.  IF NO RELIEF CALL 911. 1/7/21  Yes Arya Tobin MD   B Complex-C-Folic Acid (VIRT-CAPS) 1 MG CAPS TK ONE C PO  QD 11/18/20  Yes Arya Tobin MD   citalopram (CELEXA) 40 MG tablet TAKE (1) TABLET BY MOUTH DAILY 11/4/20  Yes Arya Tobin MD   insulin aspart (NOVOLOG FLEXPEN) 100 UNIT/ML injection pen Inject 20 Units into the skin 3 times daily (before meals) 10/12/20  Yes Lea Mahan MD   isosorbide mononitrate (IMDUR) 30 MG extended release tablet TAKE 1 TABLET BY MOUTH EVERY DAY 9/1/20  Yes Lea Mahan MD   ondansetron (ZOFRAN ODT) 4 MG disintegrating tablet Take 1 tablet by mouth every 8 hours as needed for Nausea 8/5/20  Yes Lea Mahan MD   Calcium Acetate, Phos Binder, 667 MG CAPS TAKE 1 CAPSULE BY MOUTH THREE TIMES DAILY WITH MEALS 6/29/20  Yes Lea Mahan MD   vitamin D (ERGOCALCIFEROL) 03809 units CAPS capsule TK 1 C PO WEEKLY 6/2/19  Yes Historical Provider, MD   flunisolide (NASALIDE) 25 MCG/ACT (0.025%) SOLN Inhale 2 sprays into the lungs every 12 hours 5/22/19  Yes Anjel Pedro MD   Tiotropium Bromide-Olodaterol (STIOLTO RESPIMAT) 2.5-2.5 MCG/ACT AERS Inhale 2 puffs into the lungs daily 5/21/19  Yes Anjel Pedro MD   Polyethylene Glycol 3350 GRAN  5/2/18  Yes Historical Provider, MD   albuterol sulfate  (90 Base) MCG/ACT inhaler Inhale 2 puffs into the lungs every 6 hours as needed for Wheezing 11/8/17  Yes Pastor Cruz MD   ipratropium-albuterol (DUONEB) 0.5-2.5 (3) MG/3ML SOLN nebulizer solution Inhale 3 mLs into the lungs every 6 hours as needed for Shortness of Breath 10/15/17  Yes Severa Moan, MD   calcium carbonate (TUMS) 500 MG chewable tablet Take 1 tablet by mouth 3 times daily as needed for Heartburn. Yes Historical Provider, MD   aspirin 81 MG chewable tablet Take 1 tablet by mouth daily. Patient taking differently: Take 81 mg by mouth daily Indications: stopped on 6/25 for surgery  5/14/13  Yes Panda Yo MD   BASAGLAR KWIKPEN 100 UNIT/ML injection pen ADMINISTER 60 UNITS UNDER THE SKIN EVERY NIGHT 7/29/21   Lea Mahan MD   nystatin (MYCOSTATIN) 223406 UNIT/GM cream Apply topically 2 times daily.  5/28/21   Lea Mahan MD   gabapentin (NEURONTIN) 100 MG capsule TAKE 1 TO 2 CAPSULES BY MOUTH THREE TIMES A DAY 5/25/21 6/25/21  Lea Mahan MD   predniSONE (DELTASONE) 20 MG tablet Take 3 tablets daily for 3 days, 2 tablets daily for 3 days and 1 tablet daily for 3 days. 5/7/21   JAY Odonnell - CNP   glucose monitoring kit (FREESTYLE) monitoring kit 1 kit by Does not apply route daily 1/8/21   Juli Booker MD   blood glucose test strips (GLUCOSE METER TEST) strip 1 each by In Vitro route 5 times daily As needed. 1/8/21   Juli Booker MD   blood glucose test strips (FREESTYLE LITE) strip Daily As needed. 8/19/19   Juli Booker MD   glucose monitoring kit (FREESTYLE) monitoring kit 1 kit by Does not apply route daily 8/19/19   Juli Booker MD   Glucose Blood (BLOOD GLUCOSE TEST STRIPS) STRP TEST 3-4 TIMES DAILY, AS DIRECTED 4/25/18   Sarkis Deng MD   Blood Glucose Monitoring Suppl ADAM USE AS DIRECTED. 4/25/18   Sarkis eDng MD   Alcohol Swabs PADS USE AS DIRECTED 4/25/18   Sarkis Deng MD       In-patient schedule medications:   insulin lispro  0-18 Units Subcutaneous TID WC    insulin lispro  0-9 Units Subcutaneous Nightly    insulin lispro  10 Units Subcutaneous TID WC    insulin glargine  50 Units Subcutaneous Daily    predniSONE  40 mg Oral Daily    sodium chloride flush  10 mL Intravenous 2 times per day    heparin (porcine)  5,000 Units Subcutaneous 3 times per day    epoetin siddharth-epbx  3,400 Units Intravenous Q MWF    doxercalciferol  2 mcg Intravenous Q MWF    tiotropium-olodaterol  2 puff Inhalation Daily    aspirin  81 mg Oral Daily    pravastatin  40 mg Oral Nightly    losartan  50 mg Oral Daily    pantoprazole  40 mg Oral QAM AC    isosorbide mononitrate  30 mg Oral Daily    QUEtiapine  50 mg Oral Nightly    gabapentin  100 mg Oral TID         Infusion Medications:   sodium chloride      dextrose           Allergies:  Morphine     Review of Systems:   14 point review of systems unable to be completed due to patient condition/cooperation. All available positives mentioned in history of present illness.          Physical Examination: [unfilled]  BP (!) 171/93   Pulse 95   Temp 98.2 °F (36.8 °C) (Oral)   Resp 22   Ht 5' 9\" (1.753 m) Comment: pt. states  Wt 260 lb 9.3 oz (118.2 kg)   SpO2 94%   BMI 38.48 kg/m²    Weight: 260 lb 9.3 oz (118.2 kg)     Wt Readings from Last 3 Encounters:   07/29/21 260 lb 9.3 oz (118.2 kg)   06/29/21 255 lb 9.6 oz (115.9 kg)   04/26/21 271 lb 6.2 oz (123.1 kg)       Intake/Output Summary (Last 24 hours) at 7/29/2021 1418  Last data filed at 7/29/2021 0630  Gross per 24 hour   Intake 1220 ml   Output 600 ml   Net 620 ml       General Appearance:  Alert, cooperative, no distress, appears stated age   Head:  Normocephalic, without obvious abnormality, atraumatic   Eyes:  PERRL, conjunctiva/corneas clear       Nose: Nares normal, no drainage or sinus tenderness   Throat: Lips, mucosa, and tongue normal   Neck: Supple, symmetrical, trachea midline, no adenopathy, thyroid: not enlarged, symmetric, no tenderness/mass/nodules, no carotid bruit or JVD       Lungs:   Clear to auscultation bilaterally, respirations unlabored   Chest Wall:  No tenderness or deformity   Heart:  Regular rhythm and slow rate; distant sounds   Abdomen:   Soft, non-tender, bowel sounds active all four quadrants,  no masses, no organomegaly           Extremities: Extremities normal, atraumatic, no cyanosis. + edema   Pulses: diminished   Skin: Skin color, texture, turgor normal, no rashes or lesions   Pysch: Normal mood and affect   Neurologic: Normal gross motor exam. Limited feeling in the feet and ankles.         Labs  Recent Labs     07/28/21  0500 07/29/21  0544   WBC 8.5 9.5   HGB 10.6* 10.5*   HCT 30.9* 31.2*   MCV 90.9 90.4    240     Recent Labs     07/28/21  0500 07/29/21  0543   CREATININE 5.3* 4.5*   BUN 37* 37*   * 126*   K 4.4 5.5*   CL 99 95*   CO2 24 20*     Recent Labs     07/27/21  0142   INR 0.95   PROTIME 10.7     Recent Labs     07/27/21  0142 07/27/21  0511 07/28/21  0500   TROPONINI 0.35* 0.33* 0.30*

## 2021-07-30 VITALS
OXYGEN SATURATION: 93 % | HEART RATE: 92 BPM | SYSTOLIC BLOOD PRESSURE: 164 MMHG | RESPIRATION RATE: 19 BRPM | DIASTOLIC BLOOD PRESSURE: 99 MMHG | BODY MASS INDEX: 38.3 KG/M2 | WEIGHT: 258.6 LBS | TEMPERATURE: 98.3 F | HEIGHT: 69 IN

## 2021-07-30 LAB
ALBUMIN SERPL-MCNC: 3.4 G/DL (ref 3.4–5)
ANION GAP SERPL CALCULATED.3IONS-SCNC: 15 MMOL/L (ref 3–16)
BASOPHILS ABSOLUTE: 0 K/UL (ref 0–0.2)
BASOPHILS RELATIVE PERCENT: 0.1 %
BUN BLDV-MCNC: 59 MG/DL (ref 7–20)
CALCIUM SERPL-MCNC: 9.1 MG/DL (ref 8.3–10.6)
CHLORIDE BLD-SCNC: 88 MMOL/L (ref 99–110)
CO2: 22 MMOL/L (ref 21–32)
CREAT SERPL-MCNC: 6.1 MG/DL (ref 0.9–1.3)
EOSINOPHILS ABSOLUTE: 0 K/UL (ref 0–0.6)
EOSINOPHILS RELATIVE PERCENT: 0 %
GFR AFRICAN AMERICAN: 12
GFR NON-AFRICAN AMERICAN: 10
GLUCOSE BLD-MCNC: 263 MG/DL (ref 70–99)
GLUCOSE BLD-MCNC: 364 MG/DL (ref 70–99)
GLUCOSE BLD-MCNC: 369 MG/DL (ref 70–99)
HCT VFR BLD CALC: 31.4 % (ref 40.5–52.5)
HEMOGLOBIN: 10.3 G/DL (ref 13.5–17.5)
LYMPHOCYTES ABSOLUTE: 0.9 K/UL (ref 1–5.1)
LYMPHOCYTES RELATIVE PERCENT: 6.6 %
MCH RBC QN AUTO: 29.9 PG (ref 26–34)
MCHC RBC AUTO-ENTMCNC: 32.6 G/DL (ref 31–36)
MCV RBC AUTO: 91.5 FL (ref 80–100)
MONOCYTES ABSOLUTE: 0.6 K/UL (ref 0–1.3)
MONOCYTES RELATIVE PERCENT: 4.4 %
NEUTROPHILS ABSOLUTE: 11.8 K/UL (ref 1.7–7.7)
NEUTROPHILS RELATIVE PERCENT: 88.9 %
PDW BLD-RTO: 15.1 % (ref 12.4–15.4)
PERFORMED ON: ABNORMAL
PERFORMED ON: ABNORMAL
PHOSPHORUS: 3.6 MG/DL (ref 2.5–4.9)
PLATELET # BLD: 266 K/UL (ref 135–450)
PMV BLD AUTO: 7.9 FL (ref 5–10.5)
POTASSIUM SERPL-SCNC: 5.9 MMOL/L (ref 3.5–5.1)
RBC # BLD: 3.44 M/UL (ref 4.2–5.9)
SODIUM BLD-SCNC: 125 MMOL/L (ref 136–145)
WBC # BLD: 13.3 K/UL (ref 4–11)

## 2021-07-30 PROCEDURE — 94640 AIRWAY INHALATION TREATMENT: CPT

## 2021-07-30 PROCEDURE — 6370000000 HC RX 637 (ALT 250 FOR IP): Performed by: REGISTERED NURSE

## 2021-07-30 PROCEDURE — 6360000002 HC RX W HCPCS: Performed by: INTERNAL MEDICINE

## 2021-07-30 PROCEDURE — 36415 COLL VENOUS BLD VENIPUNCTURE: CPT

## 2021-07-30 PROCEDURE — 80069 RENAL FUNCTION PANEL: CPT

## 2021-07-30 PROCEDURE — 2700000000 HC OXYGEN THERAPY PER DAY

## 2021-07-30 PROCEDURE — 90935 HEMODIALYSIS ONE EVALUATION: CPT

## 2021-07-30 PROCEDURE — 85025 COMPLETE CBC W/AUTO DIFF WBC: CPT

## 2021-07-30 PROCEDURE — 94761 N-INVAS EAR/PLS OXIMETRY MLT: CPT

## 2021-07-30 PROCEDURE — 99233 SBSQ HOSP IP/OBS HIGH 50: CPT | Performed by: INTERNAL MEDICINE

## 2021-07-30 RX ADMIN — PANTOPRAZOLE SODIUM 40 MG: 40 TABLET, DELAYED RELEASE ORAL at 06:18

## 2021-07-30 RX ADMIN — DOCUSATE SODIUM 50 MG AND SENNOSIDES 8.6 MG 2 TABLET: 8.6; 5 TABLET, FILM COATED ORAL at 12:37

## 2021-07-30 RX ADMIN — ASPIRIN 81 MG: 81 TABLET, CHEWABLE ORAL at 12:34

## 2021-07-30 RX ADMIN — DOXERCALCIFEROL 2 MCG: 4 INJECTION, SOLUTION INTRAVENOUS at 12:00

## 2021-07-30 RX ADMIN — PREDNISONE 40 MG: 20 TABLET ORAL at 12:34

## 2021-07-30 RX ADMIN — INSULIN LISPRO 9 UNITS: 100 INJECTION, SOLUTION INTRAVENOUS; SUBCUTANEOUS at 12:44

## 2021-07-30 RX ADMIN — OXYCODONE AND ACETAMINOPHEN 1 TABLET: 5; 325 TABLET ORAL at 12:34

## 2021-07-30 RX ADMIN — ISOSORBIDE MONONITRATE 30 MG: 30 TABLET, EXTENDED RELEASE ORAL at 12:35

## 2021-07-30 RX ADMIN — TIOTROPIUM BROMIDE AND OLODATEROL 2 PUFF: 3.124; 2.736 SPRAY, METERED RESPIRATORY (INHALATION) at 08:09

## 2021-07-30 RX ADMIN — OXYCODONE AND ACETAMINOPHEN 1 TABLET: 5; 325 TABLET ORAL at 06:23

## 2021-07-30 RX ADMIN — INSULIN LISPRO 10 UNITS: 100 INJECTION, SOLUTION INTRAVENOUS; SUBCUTANEOUS at 12:43

## 2021-07-30 RX ADMIN — EPOETIN ALFA-EPBX 3400 UNITS: 4000 INJECTION, SOLUTION INTRAVENOUS; SUBCUTANEOUS at 12:00

## 2021-07-30 RX ADMIN — HEPARIN SODIUM 5000 UNITS: 5000 INJECTION INTRAVENOUS; SUBCUTANEOUS at 06:18

## 2021-07-30 RX ADMIN — POLYETHYLENE GLYCOL 3350 17 G: 17 POWDER, FOR SOLUTION ORAL at 12:38

## 2021-07-30 RX ADMIN — HEPARIN SODIUM 4100 UNITS: 1000 INJECTION INTRAVENOUS; SUBCUTANEOUS at 11:47

## 2021-07-30 RX ADMIN — GABAPENTIN 100 MG: 100 CAPSULE ORAL at 12:34

## 2021-07-30 RX ADMIN — LOSARTAN POTASSIUM 50 MG: 25 TABLET, FILM COATED ORAL at 12:34

## 2021-07-30 ASSESSMENT — PAIN SCALES - GENERAL
PAINLEVEL_OUTOF10: 7
PAINLEVEL_OUTOF10: 8
PAINLEVEL_OUTOF10: 0

## 2021-07-30 ASSESSMENT — ENCOUNTER SYMPTOMS: TACHYPNEA: 1

## 2021-07-30 NOTE — PROGRESS NOTES
[address] [facility]   Cardiovascular Evaluation    PATIENT: Jacob Hawk  DATE: 2021  MRN: [id number]  CSN: [id number]  : 1970    Primary Care Doctor/Referring provider: Yaw Kitchen MD, Lydia Keating DO     Reason for evaluation/Chief complaint:   Shortness of Breath (Patient arrived via EMs after waking up [facility]. Patient missed Dialysis this am. Patient was 74 on room air upon EMS arrival. Patient on CPAP upon arrival. )    Subjective: The patient is feeling much better after extra dialysis today. More shortness of breath. History of present illness on initial date of evaluation:   Jacob Hawk is a 46 y.o. patient who presents for the evaluation of shortness of breath. Patient has an extensive history of nonischemic cardiomyopathy in conjunction with prior aortic valvular disease. Patient also has end-stage renal disease and is on hemodialysis. Patient states that he developed shortness of breath for the past several days. Patient states that the shortness of breath was worse when he would try to lay flat. Patient states that he cannot catch his breath and is very limited capability from exercise tolerance. This is all progressed over the past several days. Of note, the patient was noncompliant and missed a dialysis earlier this week. States that since his dialysis time these symptoms had gotten worse. He did cut his dialysis and had extra fluid taken off but does not feel that he is back to his baseline.       Patient Active Problem List   Diagnosis    Acute respiratory failure with hypoxia and hypercapnia (HCC)    Chronic dCHF (grade 2 LVDD)    Chronic obstructive pulmonary disease ([address]    CAD (coronary artery disease)    PVD (peripheral vascular disease) ([address]    Nonischemic cardiomyopathy ([address]    Sleep apnea    Bicuspid aortic valve    Bilateral hilar adenopathy syndrome    Claudication in peripheral vascular disease ([address]    Essential hypertension    Diabetic neuropathy (HCC)    Type 2 diabetes, uncontrolled, with neuropathy ([address]    Passive smoke exposure    Depression with anxiety    Hematoma    Pneumonia of right upper lobe due to infectious organism    DM (diabetes mellitus), secondary, uncontrolled, w/neurologic complic ([address]    Hypertensive heart disease with congestive heart failure with preserved left ventricular function ([address]    CHF exacerbation ([address]    Chest pain    Coronary artery disease involving native coronary artery of native heart without angina pectoris    Obesity (BMI 30-39. 9)    ZAINAB on CPAP    Degeneration of lumbar or lumbosacral intervertebral disc    Lumbar radiculopathy    Lumbosacral spondylosis without myelopathy    Biliary colic    Symptomatic cholelithiasis    Gastroparesis due to DM ([facility])    Angina, class IV ([facility])    Dyspnea    Elevated brain natriuretic peptide (BNP) level    Dyslipidemia    Respiratory distress    Hypoxia    Chest pressure    Hypertensive urgency    Acute on chronic combined systolic and diastolic heart failure (HCC)    Ischemic cardiomyopathy    Chronic renal failure, stage 5 ([facility])    Tobacco abuse    CKD stage 4 due to type 2 diabetes mellitus ([facility])    CVA (cerebral vascular accident) ([address]    Arterial ischemic stroke, ICA, right, acute ([address]    Type 2 diabetes mellitus without complication, without long-term current use of insulin ([address]    HTN (hypertension), benign    ZAINAB (obstructive sleep apnea)    Diarrhea    Pleural effusion    Chronic anemia    Nonrheumatic aortic valve stenosis    Hypervolemia    Hyperkalemia    Mucus plugging of bronchi    Hemodialysis-associated hypotension    Left arm pain    Dizziness    ESRD (end stage renal disease) on dialysis (HCC)    Hypotension due to drugs    Acute diastolic CHF (congestive heart failure) ([facility])    Neuromuscular disorder (HCC)    Acute combined systolic and diastolic CHF, NYHA class 4 ([address]  Renovascular hypertension    Mixed hyperlipidemia    Cigarette nicotine dependence in remission    Acute respiratory failure (HCC)    Pulmonary edema    Fluid overload    Diastolic dysfunction    Anemia of chronic disease    SOB (shortness of breath)    Steal syndrome of dialysis vascular access ([facility])    Chronic, continuous use of opioids    Chronic bronchitis ([facility])    Nasal congestion    Hypercholesteremia    Bradycardia    S/P TAVR (transcatheter aortic valve replacement)    Syncope and collapse    Atrial fibrillation ([address]    Former smoker    Generalized weakness    DKA, type 1, not at goal [facility])    Bilateral leg weakness    GBS (Guillain-[place] syndrome) ([facility])    Sinus pause    Weakness of both lower extremities    Sinus bradycardia    Ataxia    Peripheral vascular occlusive disease ([facility])    Cellulitis of right foot    Iliac artery occlusion, right ([facility])    Cellulitis and abscess of hand    Type 2 diabetes mellitus with hyperglycemia ([facility])    Acute encephalopathy         Cardiac Testing: I have reviewed the findings below.   EKG:  ECHO:   STRESS TEST:  CATH:  BYPASS:  VASCULAR:    Past Medical History:   has a past medical history of Ambulatory dysfunction, Aortic stenosis, Arthritis, Asthma, Bilateral hilar adenopathy syndrome, CAD (coronary artery disease), Cardiomyopathy ([address] CHF (congestive heart failure) ([address] Chronic pain, COPD (chronic obstructive pulmonary disease) ([address] Depression, Diabetes mellitus ([address] Difficult intravenous access, Emphysema of lung ([address] ESRD (end stage renal disease) on dialysis ([address] Fear of needles, Gastric ulcer, GERD (gastroesophageal reflux disease), Heart valve problem, Hemodialysis patient ([address] History of spinal fracture, Hx of blood clots, Hyperlipidemia, Hypertension, MI (myocardial infarction) ([address] Neuromuscular disorder ([address] Numbness and tingling in left arm, Pneumonia, PONV (postoperative nausea and vomiting), Prolonged emergence from general anesthesia, Sleep apnea, Stroke ([facility] TIA (transient ischemic attack), and Unspecified diseases of blood and blood-forming organs. Surgical History:   has a past surgical history that includes Tonsillectomy; cyst removal (08/14/2013); Colonoscopy; Coronary angioplasty with stent (05/26/15); vascular surgery (aprx 2 years ago); Colonoscopy (2/29/2015); Upper gastrointestinal endoscopy (01/06/2016); Upper gastrointestinal endoscopy (01/29/2017); other surgical history (02/01/2017); Dialysis fistula creation (Left, 10/30/2017); Diagnostic Cardiac Cath Lab Procedure; other surgical history (2018); other surgical history (Bilateral, 06/26/2018); pr lap insertion tunneled intraperitoneal catheter (N/A, 9/21/2018); other surgical history (Right, 09/2018); Dialysis fistula creation (Left, 3/27/2019); other surgical history (05/28/2019); Endoscopy, colon, diagnostic; Catheter Removal (Right, 7/2/2019); and Aortic valve replacement (N/A, 10/15/2019). Social History:   reports that he has been smoking cigarettes. He has a 16.50 pack-year smoking history. He has never used smokeless tobacco. He reports previous alcohol use. He reports that he does not use drugs. Family History:  No evidence for sudden cardiac death or premature CAD    Medications:  Reviewed and are listed in nursing record. and/or listed below  Outpatient Medications:  Prior to Admission medications    Medication Sig Start Date End Date Taking? Authorizing Provider   oxyCODONE-acetaminophen (PERCOCET) 7.5-325 MG per tablet Take 1 tablet by mouth every 4-6 hours as needed for Pain for up to 30 days.  7/22/21 8/21/21 Yes Tara Parmar MD   hydrocortisone (ANUSOL-HC) 2.5 % CREA rectal cream Place rectally 2 times daily 6/9/21  Yes Tara Parmar MD   albuterol (PROVENTIL) (2.5 MG/3ML) 0.083% nebulizer solution INHALE 1 VIAL VIA NEBULIZER EVERY 6 HOURS AS NEEDED FOR WHEEZING 6/2/21  Yes Tara Parmar MD   famotidine (PEPCID) 20 THREE TIMES A DAY 5/25/21 6/25/21  Tremaine Browning MD   predniSONE (DELTASONE) 20 MG tablet Take 3 tablets daily for 3 days, 2 tablets daily for 3 days and 1 tablet daily for 3 days. 5/7/21   JAY Pearce - CNP   glucose monitoring kit (FREESTYLE) monitoring kit 1 kit by Does not apply route daily 1/8/21   Tremaine Browning MD   blood glucose test strips (GLUCOSE METER TEST) strip 1 each by In Vitro route 5 times daily As needed. 1/8/21   Tremaine Browning MD   blood glucose test strips (FREESTYLE LITE) strip Daily As needed.  8/19/19   Tremaine Browning MD   glucose monitoring kit (FREESTYLE) monitoring kit 1 kit by Does not apply route daily 8/19/19   Tremaine Browning MD   Glucose Blood (BLOOD GLUCOSE TEST STRIPS) STRP TEST 3-4 TIMES DAILY, AS DIRECTED 4/25/18   Grover Guaman MD   Blood Glucose Monitoring Suppl ADAM USE AS DIRECTED. 4/25/18   Grover Guaman MD   Alcohol Swabs PADS USE AS DIRECTED 4/25/18   Grover Guaman MD       In-patient schedule medications:   insulin lispro  0-18 Units Subcutaneous TID WC    insulin lispro  0-9 Units Subcutaneous Nightly    insulin lispro  10 Units Subcutaneous TID WC    insulin glargine  50 Units Subcutaneous Daily    polyethylene glycol  17 g Oral Daily    sennosides-docusate sodium  2 tablet Oral BID    predniSONE  40 mg Oral Daily    sodium chloride flush  10 mL Intravenous 2 times per day    heparin (porcine)  5,000 Units Subcutaneous 3 times per day    epoetin siddharth-epbx  3,400 Units Intravenous Q MWF    doxercalciferol  2 mcg Intravenous Q MWF    tiotropium-olodaterol  2 puff Inhalation Daily    aspirin  81 mg Oral Daily    pravastatin  40 mg Oral Nightly    losartan  50 mg Oral Daily    pantoprazole  40 mg Oral QAM AC    isosorbide mononitrate  30 mg Oral Daily    QUEtiapine  50 mg Oral Nightly    gabapentin  100 mg Oral TID         Infusion Medications:   sodium chloride      dextrose           Allergies:  Morphine     Review of Systems: 14 point review of systems unable to be completed due to patient condition/cooperation. All available positives mentioned in history of present illness. Physical Examination:    [unfilled]  BP (!) 164/99   Pulse 92   Temp 98.3 °F (36.8 °C) (Oral)   Resp 19   Ht 5' 9\" (1.753 m) Comment: pt. states  Wt 276 lb 14.4 oz (125.6 kg)   SpO2 93%   BMI 40.89 kg/m²    Weight: 276 lb 14.4 oz (125.6 kg)     Wt Readings from Last 3 Encounters:   07/30/21 276 lb 14.4 oz (125.6 kg)   06/29/21 255 lb 9.6 oz (115.9 kg)   04/26/21 271 lb 6.2 oz (123.1 kg)       Intake/Output Summary (Last 24 hours) at 7/30/2021 1256  Last data filed at 7/30/2021 0849  Gross per 24 hour   Intake 340 ml   Output --   Net 340 ml       General Appearance:  Alert, cooperative, no distress, appears stated age   Head:  Normocephalic, without obvious abnormality, atraumatic   Eyes:  PERRL, conjunctiva/corneas clear       Nose: Nares normal, no drainage or sinus tenderness   Throat: Lips, mucosa, and tongue normal   Neck: Supple, symmetrical, trachea midline, no adenopathy, thyroid: not enlarged, symmetric, no tenderness/mass/nodules, no carotid bruit or JVD       Lungs:   Clear to auscultation bilaterally, respirations unlabored   Chest Wall:  No tenderness or deformity   Heart:  Regular rhythm and slow rate; distant sounds   Abdomen:   Soft, non-tender, bowel sounds active all four quadrants,  no masses, no organomegaly           Extremities: Extremities normal, atraumatic, no cyanosis. + edema   Pulses: diminished   Skin: Skin color, texture, turgor normal, no rashes or lesions   Pysch: Normal mood and affect   Neurologic: Normal gross motor exam. Limited feeling in the feet and ankles.         Labs  Recent Labs     07/29/21  0544 07/30/21  0713   WBC 9.5 13.3*   HGB 10.5* 10.3*   HCT 31.2* 31.4*   MCV 90.4 91.5    266     Recent Labs     07/29/21  0543 07/30/21  0713   CREATININE 4.5* 6.1*   BUN 37* 59*   * 125*   K 5.5* 5.9*   CL 95* 88*   CO2 20* 22     No results for input(s): INR, PROTIME in the last 72 hours. Recent Labs     07/28/21  0500   TROPONINI 0.30*     Invalid input(s): PRO-BNP  No results for input(s): CHOL, HDL in the last 72 hours. Invalid input(s): LDL, TG      Imaging:  I have reviewed the below testing personally and my interpretation is below. EKG:Junctional bradycardiaLow voltage QRSAbnormal ECGWhen compared with ECG of 10-DEC-2020 20:47,Junctional rhythm has replaced Sinus rhythm    CXR:      Assessment:  46 y.o. patient with:  1. Acute on chronic congestive heart failure exacerbation  3. History of TAVR  4. Non-ischemic CMP     Plan:  1. The patient symptomatology does appear to be related to fluid overload. Patient's noncompliance with dialysis along with what appears to be a limited medical insight have likely created volume retention. 2.  Recommend further renal replacement therapy to help patient get back to his dry weight. 3.  Did educate the patient on compliance with medication and dialysis. 4.  No acute concerns from a cardiovascular standpoint at this point that there is change from a structural standpoint. Do not feel he needs repeat cardiovascular testing at this time. 5.  Cardiology will sign off. All questions and concerns were addressed to the patient/family. Alternatives to my treatment were discussed. The note was completed using EMR. Every effort was made to ensure accuracy; however, inadvertent computerized transcription errors may be present.     Shell Blanco MD, Jed [address], [facility]  [phone] [facility]  [phone] [facility]  7/30/2021  12:56 PM

## 2021-07-30 NOTE — PROGRESS NOTES
NEPHROLOGY PROGRESS  NOTE    CC: ESRD    HPI: admitted with SOB  Improved with UF at HD    SUBJECTIVE  Seen during HD today  Off O2  Did not want to stand for weight, but states he is okay now      SOC: no visitors        Scheduled Meds:   insulin lispro  0-18 Units Subcutaneous TID WC    insulin lispro  0-9 Units Subcutaneous Nightly    insulin lispro  10 Units Subcutaneous TID WC    insulin glargine  50 Units Subcutaneous Daily    polyethylene glycol  17 g Oral Daily    sennosides-docusate sodium  2 tablet Oral BID    predniSONE  40 mg Oral Daily    sodium chloride flush  10 mL Intravenous 2 times per day    heparin (porcine)  5,000 Units Subcutaneous 3 times per day    epoetin siddharth-epbx  3,400 Units Intravenous Q MWF    doxercalciferol  2 mcg Intravenous Q MWF    tiotropium-olodaterol  2 puff Inhalation Daily    aspirin  81 mg Oral Daily    pravastatin  40 mg Oral Nightly    losartan  50 mg Oral Daily    pantoprazole  40 mg Oral QAM AC    isosorbide mononitrate  30 mg Oral Daily    QUEtiapine  50 mg Oral Nightly    gabapentin  100 mg Oral TID     Continuous Infusions:   sodium chloride      dextrose       PRN Meds:perflutren lipid microspheres, calcium carbonate, sodium chloride flush, sodium chloride, promethazine **OR** ondansetron, acetaminophen **OR** acetaminophen, glucose, dextrose, glucagon (rDNA), dextrose, heparin (porcine), heparin (porcine), ipratropium-albuterol, albuterol sulfate HFA, oxyCODONE-acetaminophen      Objective:      Physical Exam  Wt Readings from Last 3 Encounters:   07/30/21 276 lb 14.4 oz (125.6 kg)   06/29/21 255 lb 9.6 oz (115.9 kg)   04/26/21 271 lb 6.2 oz (123.1 kg)     Temp Readings from Last 3 Encounters:   07/30/21 98 °F (36.7 °C)   06/29/21 97.6 °F (36.4 °C) (Oral)   04/27/21 98.6 °F (37 °C) (Oral)     BP Readings from Last 3 Encounters:   07/30/21 (!) 179/92   06/29/21 (!) 110/55   04/27/21 (!) 151/95     Pulse Readings from Last 3 Encounters:   07/30/21 92   06/29/21 89   04/27/21 87     General appearance:  NAD. Respiratory:  Normal respiratory effort. Clear to auscultation, bilaterally without Rales/Wheezes/Rhonchi. Cardiovascular:  Regular rate and rhythm with normal S1/S2 without murmurs, rubs or gallops. Abdomen: Soft, non-tender, non-distended with normal bowel sounds. Musculoskeletal:  No clubbing, cyanosis or edema bilaterally.    Skin: Skin color, texture, turgor normal.  No rashes or lesions.   Neurologic:  grossly non-focal.           Lab Review   Lab Results   Component Value Date    WBC 13.3 (H) 07/30/2021    HGB 10.3 (L) 07/30/2021    HCT 31.4 (L) 07/30/2021    MCV 91.5 07/30/2021     07/30/2021     Lab Results   Component Value Date     07/30/2021    K 5.9 07/30/2021    K 5.5 07/29/2021    CL 88 07/30/2021    CO2 22 07/30/2021    BUN 59 07/30/2021    CREATININE 6.1 07/30/2021    GLUCOSE 364 07/30/2021    CALCIUM 9.1 07/30/2021            Patient Active Problem List    Diagnosis Date Noted    Hypotension due to drugs 11/25/2017    Acute on chronic combined systolic and diastolic heart failure (HCC)     Ischemic cardiomyopathy     Elevated brain natriuretic peptide (BNP) level     Dyslipidemia     Type 2 diabetes, uncontrolled, with neuropathy ([address] 03/04/2014    Bicuspid aortic valve 06/03/2013    Chronic dCHF (grade 2 LVDD) 05/14/2013    CAD (coronary artery disease) 05/14/2013    PVD (peripheral vascular disease) ([address] 05/14/2013    S/P TAVR (transcatheter aortic valve replacement) 10/19/2019    Chest pain     Essential hypertension 11/14/2013    Chronic obstructive pulmonary disease ([address] 05/14/2013    Type 2 diabetes mellitus with hyperglycemia ([address] 06/27/2021    Acute encephalopathy 06/27/2021    Cellulitis and abscess of hand 04/24/2021    Iliac artery occlusion, right ([address]     Cellulitis of right foot 01/29/2021    Peripheral vascular occlusive disease ([address] 01/02/2021    Ataxia     Weakness of both lower extremities 12/30/2020    Sinus bradycardia 12/30/2020    Sinus pause     GBS (Guillain-[place] syndrome) ([facility]     Bilateral leg weakness 12/04/2020    DKA, type 1, not at goal [facility]) 04/25/2020    Generalized weakness 02/04/2020    Former smoker 11/19/2019    Bradycardia 10/19/2019    Syncope and collapse 10/19/2019    Atrial fibrillation (HCC)     Hypercholesteremia 07/30/2019    Chronic bronchitis (HCC) 05/21/2019    Nasal congestion 05/21/2019    Chronic, continuous use of opioids 04/15/2019    Steal syndrome of dialysis vascular access (HCC)     SOB (shortness of breath) 94/13/3124    Diastolic dysfunction 64/68/4341    Anemia of chronic disease 11/12/2018    Acute respiratory failure ([address] 11/09/2018    Pulmonary edema 11/09/2018    Fluid overload 11/09/2018    Renovascular hypertension     Mixed hyperlipidemia     Cigarette nicotine dependence in remission     Acute combined systolic and diastolic CHF, NYHA class 4 ([address] 09/14/2018    Neuromuscular disorder ([facility])     Acute diastolic CHF (congestive heart failure) ([address] 03/18/2018    Hemodialysis-associated hypotension 11/22/2017    Left arm pain 11/22/2017    Dizziness 11/22/2017    ESRD (end stage renal disease) on dialysis ([address] 11/22/2017    Mucus plugging of bronchi     Hypervolemia     Hyperkalemia     Nonrheumatic aortic valve stenosis     Pleural effusion     Chronic anemia     Diarrhea 08/04/2017    Arterial ischemic stroke, ICA, right, acute ([address]     Type 2 diabetes mellitus without complication, without long-term current use of insulin ([address]     HTN (hypertension), benign     ZAINAB (obstructive sleep apnea)     Tobacco abuse 05/21/2017    CKD stage 4 due to type 2 diabetes mellitus ([address] 05/21/2017    CVA (cerebral vascular accident) ([address] 05/21/2017    Chronic renal failure, stage 5 ([address] 03/07/2017    Chest pressure 02/17/2017    Hypertensive urgency 02/17/2017    Hypoxia     Respiratory distress     Angina, class IV ([address]     Dyspnea     Gastroparesis due to DM ([address]     Biliary colic 12/27/9070    Symptomatic cholelithiasis 01/04/2017    Degeneration of lumbar or lumbosacral intervertebral disc 03/18/2016    Lumbar radiculopathy 03/18/2016    Lumbosacral spondylosis without myelopathy 03/18/2016    ZAINAB on CPAP 02/11/2016    Coronary artery disease involving native coronary artery of native heart without angina pectoris     Obesity (BMI 30-39. 9)     CHF exacerbation ([address] 05/24/2015    Hypertensive heart disease with congestive heart failure with preserved left ventricular function ([address] 04/24/2015    Pneumonia of right upper lobe due to infectious organism 03/28/2015    DM (diabetes mellitus), secondary, uncontrolled, w/neurologic complic ([address] 53/47/0089    Hematoma 03/17/2015    Depression with anxiety 06/05/2014    Passive smoke exposure 05/30/2014    Diabetic neuropathy ([address] 11/14/2013    Claudication in peripheral vascular disease ([address] 06/26/2013    Sleep apnea 06/03/2013    Bilateral hilar adenopathy syndrome 06/03/2013    Nonischemic cardiomyopathy ([address] 05/22/2013    Acute respiratory failure with hypoxia and hypercapnia ([address] 05/14/2013       ASSESSMENT AND PLAN     1. ESRD  -HD MWF at [facility]  -seen during HD today  -decreased the DW to ~115kg, as tolerated     2. SOB-  -continue to challenge DW at HD    3. Anemia- Hgb at  target  Continue DAVON at HD      Dispo- okay for discharge from our standpoint

## 2021-07-30 NOTE — DISCHARGE SUMMARY
Hospital Medicine Discharge Summary    Patient ID: Jamie Morley      Patient's PCP: Tremaine Browning MD    Admit Date: 7/27/2021     Discharge Date:   7/30/2021    Admitting Physician: Coy Berumen DO     Discharge Physician: JAY Carrsaco - CNP     Discharge Diagnoses: Active Hospital Problems    Diagnosis     Acute respiratory failure ([address] [J96.00]        The patient was seen and examined on day of discharge and this discharge summary is in conjunction with any daily progress note from day of discharge. Hospital Course:   46 y. o. male with multiple co-morbidities who presented to [facility] with complaints of SOB that started yesterday. Pt skipped his dialysis yesterday. He reports BLE swelling. No cough/sputum production. No chest pain. No N/V/D. Reportedly pt had low O2 sats in the 70-80s per EMS report. He was placed on BiPAP and transported to the hospital. He is currently on 3 LPM in the ED. He does not wear O2 at home. CXR with findings of pulmonary vascular congestion. Nephrology consulted. Admitted for further evaluation and management.      Dyspnea likely due to fluid overload in setting of ESRD / medical non-compliance with HD  - Nephrology consulted, fluid removal with HD, on MWF schedule     Chronic troponin elevation   - EKG non-acute  - no complaints of angina  - flat troponin trend not consistent with ACS  - likely secondary to reduced renal clearance   - cardiology consulted, no further cardiac workup indicated while inpt      DMII  - poorly controlled, A1C 10  - continue basal bolus insulin regimen while inpt, resume his home regimen at home   - carb-control diet      CAD, AS s/p TAVR  - no active chest pain  - continue ASA, statin, imdur     HTN  - poorly controlled  - continue home losartan, cardizem, hydralazine     HLD  - continue home statin     COPD  - thought possible exacerbation and started steroids however less likely given symptomatic improvement with HD, no plan to continue steroids at dc   - continue home inhalers     Anemia  - 2/2 ESRD  - at baseline     ZAINAB  - CPAP QHS and during the day with naps      Morbid Obesity  - With Body mass index is 94.91 kg/m². Complicating assessment and treatment. Placing patient at risk for multiple co-morbidities as well as early death and contributing to the patient's presentation. Counseled on weight loss.     Mild leukocytosis  - likely secondary to steroids, afebrile, no overt signs/symptoms to suggest infection       Physical Exam Performed:     BP (!) 179/92   Pulse 92   Temp 98 °F (36.7 °C)   Resp 24   Ht 5' 9\" (1.753 m) Comment: pt. states  Wt 276 lb 14.4 oz (125.6 kg)   SpO2 93%   BMI 40.89 kg/m²       General appearance:  Obese male in no apparent distress, appears stated age and cooperative. HEENT:  Normal cephalic, atraumatic without obvious deformity. Pupils equal, round, and reactive to light. Extra ocular muscles intact. Conjunctivae/corneas clear. Neck: Supple, with full range of motion. No jugular venous distention. Trachea midline. Respiratory:  Normal respiratory effort. Clear to auscultation, bilaterally without Rales/Wheezes/Rhonchi. Cardiovascular:  Regular rate and rhythm with normal S1/S2 without murmurs, rubs or gallops. Abdomen: Soft, non-tender, non-distended with normal bowel sounds. Musculoskeletal:  No clubbing, cyanosis or edema bilaterally. Full range of motion without deformity. Skin: Skin color, texture, turgor normal.  No rashes or lesions. Neurologic:  Neurovascularly intact without any focal sensory/motor deficits. Cranial nerves: II-XII intact, grossly non-focal.  Psychiatric:  Alert and oriented, thought content appropriate, normal insight  Capillary Refill: Brisk,< 3 seconds   Peripheral Pulses: +2 palpable, equal bilaterally       Labs:  For convenience and continuity at follow-up the following most recent labs are provided:      CBC:    Lab Results   Component Value Date    WBC 13.3 07/30/2021    HGB 10.3 07/30/2021    HCT 31.4 07/30/2021     07/30/2021       Renal:    Lab Results   Component Value Date     07/30/2021    K 5.9 07/30/2021    K 5.5 07/29/2021    CL 88 07/30/2021    CO2 22 07/30/2021    BUN 59 07/30/2021    CREATININE 6.1 07/30/2021    CALCIUM 9.1 07/30/2021    PHOS 3.6 07/30/2021         Significant Diagnostic Studies    Radiology:   XR CHEST PORTABLE   Final Result   Findings suggesting mild central pulmonary vascular congestion/interstitial   pulmonary edema. Consults:     IP CONSULT TO NEPHROLOGY  IP CONSULT TO HOSPITALIST  IP CONSULT TO CARDIOLOGY    Disposition:  Home     Condition at Discharge: Stable    Discharge Instructions/Follow-up:  Follow-up with PCP     Code Status:  Full Code     Activity: activity as tolerated    Diet: diabetic diet and renal diet      Discharge Medications:     Current Discharge Medication List           Details   oxyCODONE-acetaminophen (PERCOCET) 7.5-325 MG per tablet Take 1 tablet by mouth every 4-6 hours as needed for Pain for up to 30 days.   Qty: 150 tablet, Refills: 0    Comments: Reduce doses taken as pain becomes manageable  Associated Diagnoses: Cellulitis, unspecified cellulitis site      hydrocortisone (ANUSOL-HC) 2.5 % CREA rectal cream Place rectally 2 times daily  Qty: 30 g, Refills: 0    Associated Diagnoses: Other hemorrhoids      albuterol (PROVENTIL) (2.5 MG/3ML) 0.083% nebulizer solution INHALE 1 VIAL VIA NEBULIZER EVERY 6 HOURS AS NEEDED FOR WHEEZING  Qty: 300 mL, Refills: 5      famotidine (PEPCID) 20 MG tablet TAKE 1 TABLET BY MOUTH TWICE DAILY AS NEEDED FOR HEARTBURN  Qty: 180 tablet, Refills: 0    Comments: **Patient requests 90 days supply**  Associated Diagnoses: Peripheral polyneuropathy      hydrOXYzine (VISTARIL) 50 MG capsule TAKE 1 TO 2 CAPSULES BY MOUTH NIGHTLY  Qty: 60 capsule, Refills: 5      LINZESS 145 MCG capsule TAKE 1 CAPSULE BY MOUTH EVERY MORNING BEFORE BREAKFAST  Qty: 30 capsule, Refills: 10    Associated Diagnoses: Chronic idiopathic constipation      hydrALAZINE (APRESOLINE) 50 MG tablet TAKE 1/2 TABLET BY MOUTH EVERY 8 HOURS  Qty: 90 tablet, Refills: 2      traZODone (DESYREL) 150 MG tablet TAKE (1) TABLET BY MOUTH NIGHTLY  Qty: 30 tablet, Refills: 10      dilTIAZem (CARDIZEM CD) 180 MG extended release capsule       cyclobenzaprine (FLEXERIL) 10 MG tablet TAKE 1 TABLET BY MOUTH EVERY 8 HOURS AS NEEDED  Qty: 90 tablet, Refills: 5      DULoxetine (CYMBALTA) 30 MG extended release capsule TAKE 1 CAPSULE BY MOUTH EVERY DAY  Qty: 30 capsule, Refills: 10    Associated Diagnoses: Depression, unspecified depression type      tiZANidine (ZANAFLEX) 4 MG tablet TAKE 1 TABLET BY MOUTH THREE TIMES DAILY  Qty: 90 tablet, Refills: 10      pravastatin (PRAVACHOL) 40 MG tablet Take 1 tablet by mouth nightly  Qty: 30 tablet, Refills: 3      QUEtiapine (SEROQUEL) 50 MG tablet Take 1 tablet by mouth every evening  Qty: 30 tablet, Refills: 5    Associated Diagnoses: Insomnia, unspecified type; Mood disorder (HCC)      losartan (COZAAR) 50 MG tablet TAKE 1 TABLET BY MOUTH DAILY  Qty: 30 tablet, Refills: 10      pantoprazole (PROTONIX) 40 MG tablet TAKE (1) TABLET BY MOUTH EACH MORNING BEFORE BREAKFAST  Qty: 90 tablet, Refills: 1      simethicone (MYLICON) 80 MG chewable tablet Take 1 tablet by mouth every 6 hours as needed (cramping)  Qty: 15 tablet, Refills: 0      nitroGLYCERIN (NITROSTAT) 0.4 MG SL tablet DISSOLVE 1 TABLET UNDER THE TONGUE AS NEEDED FOR CHEST PAIN EVERY 5 MINUTES UP TO 3 TIMES. IF NO RELIEF CALL 911.   Qty: 25 tablet, Refills: 10    Associated Diagnoses: Coronary artery disease involving native heart without angina pectoris, unspecified vessel or lesion type      B Complex-C-Folic Acid (VIRT-CAPS) 1 MG CAPS TK ONE C PO  QD  Qty: 90 capsule, Refills: 1      citalopram (CELEXA) 40 MG tablet TAKE (1) TABLET BY MOUTH DAILY  Qty: 30 tablet, Refills: 10    Associated Diagnoses: Depression, unspecified depression type      insulin aspart (NOVOLOG FLEXPEN) 100 UNIT/ML injection pen Inject 20 Units into the skin 3 times daily (before meals)  Qty: 15 pen, Refills: 5    Associated Diagnoses: Type 2 diabetes mellitus with diabetic nephropathy, with long-term current use of insulin (HCC)      isosorbide mononitrate (IMDUR) 30 MG extended release tablet TAKE 1 TABLET BY MOUTH EVERY DAY  Qty: 90 tablet, Refills: 10      ondansetron (ZOFRAN ODT) 4 MG disintegrating tablet Take 1 tablet by mouth every 8 hours as needed for Nausea  Qty: 60 tablet, Refills: 0      Calcium Acetate, Phos Binder, 667 MG CAPS TAKE 1 CAPSULE BY MOUTH THREE TIMES DAILY WITH MEALS  Qty: 90 capsule, Refills: 3      vitamin D (ERGOCALCIFEROL) 66485 units CAPS capsule TK 1 C PO WEEKLY  Refills: 11      flunisolide (NASALIDE) 25 MCG/ACT (0.025%) SOLN Inhale 2 sprays into the lungs every 12 hours  Qty: 1 Bottle, Refills: 5      Tiotropium Bromide-Olodaterol (STIOLTO RESPIMAT) 2.5-2.5 MCG/ACT AERS Inhale 2 puffs into the lungs daily  Qty: 2 Inhaler, Refills: 0    Comments: 2 samples given:  Lot #[id number], Exp 7/21 & Lot #[id number], Exp 7/21      Polyethylene Glycol 3350 GRAN       albuterol sulfate  (90 Base) MCG/ACT inhaler Inhale 2 puffs into the lungs every 6 hours as needed for Wheezing  Qty: 1 Inhaler, Refills: 3      ipratropium-albuterol (DUONEB) 0.5-2.5 (3) MG/3ML SOLN nebulizer solution Inhale 3 mLs into the lungs every 6 hours as needed for Shortness of Breath  Qty: 360 mL, Refills: 1      calcium carbonate (TUMS) 500 MG chewable tablet Take 1 tablet by mouth 3 times daily as needed for Heartburn. aspirin 81 MG chewable tablet Take 1 tablet by mouth daily.   Qty: 30 tablet, Refills: 2      BASAGLAR KWIKPEN 100 UNIT/ML injection pen ADMINISTER 60 UNITS UNDER THE SKIN EVERY NIGHT  Qty: 15 mL, Refills: 5    Associated Diagnoses: Type 2 diabetes mellitus with diabetic nephropathy, with long-term current use of insulin (HCC)      nystatin (MYCOSTATIN) 927253 UNIT/GM cream Apply topically 2 times daily. Qty: 60 g, Refills: 3    Associated Diagnoses: Yeast dermatitis      gabapentin (NEURONTIN) 100 MG capsule TAKE 1 TO 2 CAPSULES BY MOUTH THREE TIMES A DAY  Qty: 180 capsule, Refills: 5    Associated Diagnoses: Chronic pain syndrome      predniSONE (DELTASONE) 20 MG tablet Take 3 tablets daily for 3 days, 2 tablets daily for 3 days and 1 tablet daily for 3 days. Qty: 18 tablet, Refills: 0      !! glucose monitoring kit (FREESTYLE) monitoring kit 1 kit by Does not apply route daily  Qty: 1 kit, Refills: 0    Comments: Which ever is coverd. !! blood glucose test strips (GLUCOSE METER TEST) strip 1 each by In Vitro route 5 times daily As needed. Qty: 100 each, Refills: 3      !! blood glucose test strips (FREESTYLE LITE) strip Daily As needed. Qty: 100 strip, Refills: 3    Comments: Please dispense what is covered by insurance      !! glucose monitoring kit (FREESTYLE) monitoring kit 1 kit by Does not apply route daily  Qty: 1 kit, Refills: 0    Comments: Please dispense what is covered by insurance      !! Glucose Blood (BLOOD GLUCOSE TEST STRIPS) STRP TEST 3-4 TIMES DAILY, AS DIRECTED  Qty: 100 strip, Refills: 3      Blood Glucose Monitoring Suppl ADAM USE AS DIRECTED. Qty: 1 Device, Refills: 0    Comments: WITH CONTROL SOLUTION. Alcohol Swabs PADS USE AS DIRECTED  Qty: 300 each, Refills: 3       !! - Potential duplicate medications found. Please discuss with provider. Time Spent on discharge is more than 45 minutes in the examination, evaluation, counseling and review of medications and discharge plan. Signed:    Comfort Starks, JAY - CNP   7/30/2021      Thank you Isamar Montiel MD for the opportunity to be involved in this patient's care. If you have any questions or concerns please feel free to contact me at [phone].

## 2021-07-30 NOTE — FLOWSHEET NOTE
Treatment time: 3 hours  Net UF: 4200 ml    Pre weight: 125.6 kg ( Bed scale)  Post weight: 117.3 kg ( Stand scale)  EDW: 115 kg    Access used: Left chest wall TDC  Access function: Good with  ml/min    Medications or blood products given: Retacrit, Hectorol    Regular outpatient schedule: [facility] Summary of response to treatment: Pt tolerated ok with HD. Vital signs stable during HD. Gained too much fluid between HD. Pt stated he got constipation since 07/17, reported to primary nurse for follow up. Pt wants to terminate HD 0.5 hours early, RAJAN durbin, MD noticed, heparin dwell in [address], capped and clamped. Cirt Line: Initial Hct: 29.1;       Copy of dialysis treatment record placed in chart, to be scanned into EMR.     07/30/21 0902 07/30/21 1208   Vital Signs   BP (!) 179/92 (!) 161/86   Temp 98 °F (36.7 °C) 97.6 °F (36.4 °C)   Pulse 92 106   Resp 24 20   SpO2 93 % 93 %   Weight 276 lb 14.4 oz (125.6 kg) 258 lb 9.6 oz (117.3 kg)   Weight Method Actual;Bed scale Actual;Standing scale   Percent Weight Change 6.26 -6.61   Dry Weight 253 lb 8.5 oz (115 kg)  --    Post-Hemodialysis Assessment   Post-Treatment Procedures  --  Blood returned;Catheter capped, clamped and heparinized x 2 ports   Machine Disinfection Process  --  Acid/Vinegar Clean;Heat Disinfect; Exterior Machine Disinfection   Rinseback Volume (ml)  --  400 ml   Total Liters Processed (l/min)  --  65 l/min   Dialyzer Clearance  --  Moderately streaked   Duration of Treatment (minutes)  --  180 minutes   Heparin amount administered during treatment (units)  --  0 units   Hemodialysis Intake (ml)  --  400 ml   Hemodialysis Output (ml)  --  4600 ml   NET Removed (ml)  --  4200 ml   Tolerated Treatment  --  Fair

## 2021-07-30 NOTE — PLAN OF CARE
Problem: Falls - Risk of:  Goal: Will remain free from falls  Description: Will remain free from falls  7/29/2021 2240 by Shayan Izaguirre RN  Outcome: Ongoing     Problem: Falls - Risk of:  Goal: Absence of physical injury  Description: Absence of physical injury  Outcome: Ongoing     Problem: Skin Integrity:  Goal: Will show no infection signs and symptoms  Description: Will show no infection signs and symptoms  Outcome: Ongoing     Problem: Skin Integrity:  Goal: Absence of new skin breakdown  Description: Absence of new skin breakdown  Outcome: Ongoing

## 2021-08-03 ENCOUNTER — TELEPHONE (OUTPATIENT)
Dept: FAMILY MEDICINE CLINIC | Age: 51
End: 2021-08-03

## 2021-08-03 DIAGNOSIS — L03.90 CELLULITIS, UNSPECIFIED CELLULITIS SITE: ICD-10-CM

## 2021-08-03 RX ORDER — OXYCODONE AND ACETAMINOPHEN 7.5; 325 MG/1; MG/1
1 TABLET ORAL
Qty: 150 TABLET | Refills: 0 | Status: ON HOLD | OUTPATIENT
Start: 2021-08-03 | End: 2021-09-03 | Stop reason: HOSPADM

## 2021-08-03 NOTE — TELEPHONE ENCOUNTER
Last Office Visit  -6-  Next Office Visit  -  8-    Last Filled  -  Last UDS -       Refill oxyCODONE-acetaminophen (PERCOCET) 7.5-325 MG per tablet 150 tablet  [address]

## 2021-08-03 NOTE — TELEPHONE ENCOUNTER
Last Office Visit  -  6/22/21  Next Office Visit  -  8/17/21    Last Filled  -  7/22/21 #150  Last UDS -  n/a  Contract -  n/a

## 2021-08-05 DIAGNOSIS — F39 MOOD DISORDER (HCC): ICD-10-CM

## 2021-08-05 DIAGNOSIS — G47.00 INSOMNIA, UNSPECIFIED TYPE: ICD-10-CM

## 2021-08-05 RX ORDER — PRAVASTATIN SODIUM 40 MG
TABLET ORAL
Qty: 30 TABLET | Refills: 1 | Status: SHIPPED | OUTPATIENT
Start: 2021-08-05 | End: 2021-09-29

## 2021-08-05 RX ORDER — QUETIAPINE FUMARATE 50 MG/1
TABLET, FILM COATED ORAL
Qty: 30 TABLET | Refills: 2 | Status: SHIPPED | OUTPATIENT
Start: 2021-08-05 | End: 2021-10-18

## 2021-08-05 NOTE — TELEPHONE ENCOUNTER
Last Office Visit  -  06/17/21  Next Office Visit  -  08/22/21    Last Filled  -  03/25/21  Last UDS -  n/a  Contract -  n/a

## 2021-08-09 ENCOUNTER — TELEPHONE (OUTPATIENT)
Dept: FAMILY MEDICINE CLINIC | Age: 51
End: 2021-08-09

## 2021-08-09 NOTE — TELEPHONE ENCOUNTER
Pt called back and states that the insurance said it was to early to  but he hasn't picked up the rx from 7/22/21 and it looks like another rx was sent on 8/3 that pt was unaware of.

## 2021-08-09 NOTE — TELEPHONE ENCOUNTER
Received call from patient requesting call back from [facility]. Patient gave no information why they were calling.

## 2021-08-09 NOTE — TELEPHONE ENCOUNTER
Pt states Pharmacy will not fill script until next week. Pt Requesting PCP to Notify Pharmacy that script Needs Filled now 8/9/21.   oxyCODONE-acetaminophen (PERCOCET) 7.5-325 MG per tab      [facility] [address] [phone] - F [phone]

## 2021-08-09 NOTE — TELEPHONE ENCOUNTER
Spoke to Melissa Borges at [facility] and pt did pick this up on 7/22/21 and it is only for a 25 day supply but the directions says for 30 days so they can not fill it until 30 days.

## 2021-08-09 NOTE — TELEPHONE ENCOUNTER
Pt called back. He spoke to the pharmacy and they told him it isn't due until 8/14. He isn't sure where they got that date from.  Please advise

## 2021-08-12 ENCOUNTER — APPOINTMENT (OUTPATIENT)
Dept: GENERAL RADIOLOGY | Age: 51
DRG: 640 | End: 2021-08-12
Payer: MEDICARE

## 2021-08-12 ENCOUNTER — HOSPITAL ENCOUNTER (INPATIENT)
Age: 51
LOS: 3 days | Discharge: HOME | DRG: 640 | End: 2021-08-15
Attending: EMERGENCY MEDICINE | Admitting: INTERNAL MEDICINE
Payer: MEDICARE

## 2021-08-12 DIAGNOSIS — R73.9 HYPERGLYCEMIA: ICD-10-CM

## 2021-08-12 DIAGNOSIS — J96.01 ACUTE RESPIRATORY FAILURE WITH HYPOXIA (HCC): ICD-10-CM

## 2021-08-12 DIAGNOSIS — Z99.2 ESRD (END STAGE RENAL DISEASE) ON DIALYSIS (HCC): ICD-10-CM

## 2021-08-12 DIAGNOSIS — N18.6 ESRD (END STAGE RENAL DISEASE) ON DIALYSIS (HCC): ICD-10-CM

## 2021-08-12 DIAGNOSIS — J81.0 FLASH PULMONARY EDEMA (HCC): Primary | ICD-10-CM

## 2021-08-12 LAB
A/G RATIO: 1 (ref 1.1–2.2)
ALBUMIN SERPL-MCNC: 3.6 G/DL (ref 3.4–5)
ALP BLD-CCNC: 101 U/L (ref 40–129)
ALT SERPL-CCNC: 16 U/L (ref 10–40)
ANION GAP SERPL CALCULATED.3IONS-SCNC: 19 MMOL/L (ref 3–16)
AST SERPL-CCNC: 12 U/L (ref 15–37)
BASE EXCESS VENOUS: -6.7 MMOL/L (ref -3–3)
BASOPHILS ABSOLUTE: 0.2 K/UL (ref 0–0.2)
BASOPHILS RELATIVE PERCENT: 1 %
BILIRUB SERPL-MCNC: 0.3 MG/DL (ref 0–1)
BUN BLDV-MCNC: 79 MG/DL (ref 7–20)
CALCIUM SERPL-MCNC: 8.8 MG/DL (ref 8.3–10.6)
CARBOXYHEMOGLOBIN: 2.4 % (ref 0–1.5)
CHLORIDE BLD-SCNC: 93 MMOL/L (ref 99–110)
CO2: 21 MMOL/L (ref 21–32)
CREAT SERPL-MCNC: 6.4 MG/DL (ref 0.9–1.3)
EOSINOPHILS ABSOLUTE: 0.5 K/UL (ref 0–0.6)
EOSINOPHILS RELATIVE PERCENT: 2.6 %
GFR AFRICAN AMERICAN: 11
GFR NON-AFRICAN AMERICAN: 9
GLOBULIN: 3.5 G/DL
GLUCOSE BLD-MCNC: 666 MG/DL (ref 70–99)
HCO3 VENOUS: 21.4 MMOL/L (ref 23–29)
HCT VFR BLD CALC: 37.2 % (ref 40.5–52.5)
HEMOGLOBIN: 11.9 G/DL (ref 13.5–17.5)
LACTIC ACID, SEPSIS: 2.9 MMOL/L (ref 0.4–1.9)
LYMPHOCYTES ABSOLUTE: 2.3 K/UL (ref 1–5.1)
LYMPHOCYTES RELATIVE PERCENT: 11 %
MCH RBC QN AUTO: 30.4 PG (ref 26–34)
MCHC RBC AUTO-ENTMCNC: 32.1 G/DL (ref 31–36)
MCV RBC AUTO: 94.9 FL (ref 80–100)
METHEMOGLOBIN VENOUS: 0.3 %
MONOCYTES ABSOLUTE: 1.3 K/UL (ref 0–1.3)
MONOCYTES RELATIVE PERCENT: 6.2 %
NEUTROPHILS ABSOLUTE: 16.2 K/UL (ref 1.7–7.7)
NEUTROPHILS RELATIVE PERCENT: 79.2 %
O2 SAT, VEN: 89 %
O2 THERAPY: ABNORMAL
PCO2, VEN: 54.1 MMHG (ref 40–50)
PDW BLD-RTO: 16.5 % (ref 12.4–15.4)
PH VENOUS: 7.21 (ref 7.35–7.45)
PLATELET # BLD: 254 K/UL (ref 135–450)
PMV BLD AUTO: 9.4 FL (ref 5–10.5)
PO2, VEN: 66.8 MMHG (ref 25–40)
POTASSIUM SERPL-SCNC: 4.2 MMOL/L (ref 3.5–5.1)
PRO-BNP: ABNORMAL PG/ML (ref 0–124)
RBC # BLD: 3.92 M/UL (ref 4.2–5.9)
SODIUM BLD-SCNC: 133 MMOL/L (ref 136–145)
TCO2 CALC VENOUS: 23 MMOL/L
TOTAL PROTEIN: 7.1 G/DL (ref 6.4–8.2)
TROPONIN: 0.18 NG/ML
WBC # BLD: 20.5 K/UL (ref 4–11)

## 2021-08-12 PROCEDURE — 94660 CPAP INITIATION&MGMT: CPT

## 2021-08-12 PROCEDURE — 2060000000 HC ICU INTERMEDIATE R&B

## 2021-08-12 PROCEDURE — 82803 BLOOD GASES ANY COMBINATION: CPT

## 2021-08-12 PROCEDURE — 2500000003 HC RX 250 WO HCPCS: Performed by: EMERGENCY MEDICINE

## 2021-08-12 PROCEDURE — 80053 COMPREHEN METABOLIC PANEL: CPT

## 2021-08-12 PROCEDURE — 71045 X-RAY EXAM CHEST 1 VIEW: CPT

## 2021-08-12 PROCEDURE — 93005 ELECTROCARDIOGRAM TRACING: CPT | Performed by: INTERNAL MEDICINE

## 2021-08-12 PROCEDURE — 2700000000 HC OXYGEN THERAPY PER DAY

## 2021-08-12 PROCEDURE — 83880 ASSAY OF NATRIURETIC PEPTIDE: CPT

## 2021-08-12 PROCEDURE — 94761 N-INVAS EAR/PLS OXIMETRY MLT: CPT

## 2021-08-12 PROCEDURE — 96365 THER/PROPH/DIAG IV INF INIT: CPT

## 2021-08-12 PROCEDURE — 6370000000 HC RX 637 (ALT 250 FOR IP): Performed by: EMERGENCY MEDICINE

## 2021-08-12 PROCEDURE — 87040 BLOOD CULTURE FOR BACTERIA: CPT

## 2021-08-12 PROCEDURE — 84484 ASSAY OF TROPONIN QUANT: CPT

## 2021-08-12 PROCEDURE — 99284 EMERGENCY DEPT VISIT MOD MDM: CPT

## 2021-08-12 PROCEDURE — 85025 COMPLETE CBC W/AUTO DIFF WBC: CPT

## 2021-08-12 PROCEDURE — 83605 ASSAY OF LACTIC ACID: CPT

## 2021-08-12 RX ORDER — TORSEMIDE 100 MG/1
TABLET ORAL
Qty: 180 TABLET | Refills: 3 | Status: ON HOLD | OUTPATIENT
Start: 2021-08-12 | End: 2021-09-03 | Stop reason: HOSPADM

## 2021-08-12 RX ORDER — INSULIN GLARGINE 100 [IU]/ML
60 INJECTION, SOLUTION SUBCUTANEOUS NIGHTLY
Status: DISCONTINUED | OUTPATIENT
Start: 2021-08-13 | End: 2021-08-15 | Stop reason: HOSPADM

## 2021-08-12 RX ORDER — CALCIUM ACETATE 667 MG/1
CAPSULE ORAL
Qty: 90 CAPSULE | Refills: 3 | Status: SHIPPED | OUTPATIENT
Start: 2021-08-12

## 2021-08-12 RX ORDER — NICOTINE POLACRILEX 4 MG
15 LOZENGE BUCCAL PRN
Status: DISCONTINUED | OUTPATIENT
Start: 2021-08-12 | End: 2021-08-15 | Stop reason: HOSPADM

## 2021-08-12 RX ORDER — DEXTROSE MONOHYDRATE 25 G/50ML
12.5 INJECTION, SOLUTION INTRAVENOUS PRN
Status: DISCONTINUED | OUTPATIENT
Start: 2021-08-12 | End: 2021-08-15 | Stop reason: HOSPADM

## 2021-08-12 RX ORDER — NITROGLYCERIN 0.4 MG/1
0.4 TABLET SUBLINGUAL ONCE
Status: COMPLETED | OUTPATIENT
Start: 2021-08-12 | End: 2021-08-12

## 2021-08-12 RX ORDER — NITROGLYCERIN 20 MG/100ML
5-200 INJECTION INTRAVENOUS CONTINUOUS
Status: DISCONTINUED | OUTPATIENT
Start: 2021-08-12 | End: 2021-08-13

## 2021-08-12 RX ORDER — DEXTROSE MONOHYDRATE 50 MG/ML
100 INJECTION, SOLUTION INTRAVENOUS PRN
Status: DISCONTINUED | OUTPATIENT
Start: 2021-08-12 | End: 2021-08-15 | Stop reason: HOSPADM

## 2021-08-12 RX ORDER — OXYCODONE AND ACETAMINOPHEN 7.5; 325 MG/1; MG/1
1 TABLET ORAL EVERY 6 HOURS PRN
Status: DISCONTINUED | OUTPATIENT
Start: 2021-08-12 | End: 2021-08-15 | Stop reason: HOSPADM

## 2021-08-12 RX ADMIN — NITROGLYCERIN 0.4 MG: 0.4 TABLET, ORALLY DISINTEGRATING SUBLINGUAL at 22:27

## 2021-08-12 RX ADMIN — NITROGLYCERIN 50 MCG/MIN: 20 INJECTION INTRAVENOUS at 22:30

## 2021-08-12 NOTE — TELEPHONE ENCOUNTER
Last Office Visit  -  6/22/21  Next Office Visit  -  8/17/21    Last Filled  -    Last UDS -    Contract -

## 2021-08-13 PROBLEM — I50.9 CHF (CONGESTIVE HEART FAILURE) (HCC): Status: ACTIVE | Noted: 2021-08-13

## 2021-08-13 LAB
ANION GAP SERPL CALCULATED.3IONS-SCNC: 17 MMOL/L (ref 3–16)
BASOPHILS ABSOLUTE: 0.1 K/UL (ref 0–0.2)
BASOPHILS RELATIVE PERCENT: 0.8 %
BUN BLDV-MCNC: 89 MG/DL (ref 7–20)
CALCIUM SERPL-MCNC: 8.9 MG/DL (ref 8.3–10.6)
CHLORIDE BLD-SCNC: 96 MMOL/L (ref 99–110)
CO2: 20 MMOL/L (ref 21–32)
CREAT SERPL-MCNC: 7.5 MG/DL (ref 0.9–1.3)
EKG ATRIAL RATE: 140 BPM
EKG DIAGNOSIS: NORMAL
EKG P AXIS: 77 DEGREES
EKG P-R INTERVAL: 160 MS
EKG Q-T INTERVAL: 292 MS
EKG QRS DURATION: 86 MS
EKG QTC CALCULATION (BAZETT): 445 MS
EKG R AXIS: 74 DEGREES
EKG T AXIS: 58 DEGREES
EKG VENTRICULAR RATE: 140 BPM
EOSINOPHILS ABSOLUTE: 0.3 K/UL (ref 0–0.6)
EOSINOPHILS RELATIVE PERCENT: 3.2 %
GFR AFRICAN AMERICAN: 9
GFR NON-AFRICAN AMERICAN: 8
GLUCOSE BLD-MCNC: 139 MG/DL (ref 70–99)
GLUCOSE BLD-MCNC: 150 MG/DL (ref 70–99)
GLUCOSE BLD-MCNC: 303 MG/DL (ref 70–99)
GLUCOSE BLD-MCNC: 331 MG/DL (ref 70–99)
GLUCOSE BLD-MCNC: 567 MG/DL (ref 70–99)
GLUCOSE BLD-MCNC: 650 MG/DL (ref 70–99)
HBV SURFACE AB TITR SER: 278 MIU/ML
HCT VFR BLD CALC: 29.2 % (ref 40.5–52.5)
HEMOGLOBIN: 9.8 G/DL (ref 13.5–17.5)
HEPATITIS B SURFACE ANTIGEN INTERPRETATION: NORMAL
LACTIC ACID, SEPSIS: 3.1 MMOL/L (ref 0.4–1.9)
LYMPHOCYTES ABSOLUTE: 1.1 K/UL (ref 1–5.1)
LYMPHOCYTES RELATIVE PERCENT: 10.3 %
MAGNESIUM: 2 MG/DL (ref 1.8–2.4)
MCH RBC QN AUTO: 30.8 PG (ref 26–34)
MCHC RBC AUTO-ENTMCNC: 33.6 G/DL (ref 31–36)
MCV RBC AUTO: 91.8 FL (ref 80–100)
MONOCYTES ABSOLUTE: 0.7 K/UL (ref 0–1.3)
MONOCYTES RELATIVE PERCENT: 6.3 %
NEUTROPHILS ABSOLUTE: 8.7 K/UL (ref 1.7–7.7)
NEUTROPHILS RELATIVE PERCENT: 79.4 %
PDW BLD-RTO: 16.1 % (ref 12.4–15.4)
PERFORMED ON: ABNORMAL
PLATELET # BLD: 172 K/UL (ref 135–450)
PMV BLD AUTO: 9.3 FL (ref 5–10.5)
POTASSIUM REFLEX MAGNESIUM: 3.3 MMOL/L (ref 3.5–5.1)
RBC # BLD: 3.18 M/UL (ref 4.2–5.9)
SODIUM BLD-SCNC: 133 MMOL/L (ref 136–145)
TROPONIN: 0.22 NG/ML
TROPONIN: 0.23 NG/ML
WBC # BLD: 10.9 K/UL (ref 4–11)

## 2021-08-13 PROCEDURE — 6370000000 HC RX 637 (ALT 250 FOR IP): Performed by: INTERNAL MEDICINE

## 2021-08-13 PROCEDURE — 80048 BASIC METABOLIC PNL TOTAL CA: CPT

## 2021-08-13 PROCEDURE — 85025 COMPLETE CBC W/AUTO DIFF WBC: CPT

## 2021-08-13 PROCEDURE — 83605 ASSAY OF LACTIC ACID: CPT

## 2021-08-13 PROCEDURE — 87340 HEPATITIS B SURFACE AG IA: CPT

## 2021-08-13 PROCEDURE — 93010 ELECTROCARDIOGRAM REPORT: CPT | Performed by: INTERNAL MEDICINE

## 2021-08-13 PROCEDURE — 90935 HEMODIALYSIS ONE EVALUATION: CPT

## 2021-08-13 PROCEDURE — 5A1D70Z PERFORMANCE OF URINARY FILTRATION, INTERMITTENT, LESS THAN 6 HOURS PER DAY: ICD-10-PCS | Performed by: INTERNAL MEDICINE

## 2021-08-13 PROCEDURE — 2580000003 HC RX 258: Performed by: EMERGENCY MEDICINE

## 2021-08-13 PROCEDURE — 86706 HEP B SURFACE ANTIBODY: CPT

## 2021-08-13 PROCEDURE — 83735 ASSAY OF MAGNESIUM: CPT

## 2021-08-13 PROCEDURE — 84484 ASSAY OF TROPONIN QUANT: CPT

## 2021-08-13 PROCEDURE — 6360000002 HC RX W HCPCS: Performed by: EMERGENCY MEDICINE

## 2021-08-13 PROCEDURE — 82947 ASSAY GLUCOSE BLOOD QUANT: CPT

## 2021-08-13 PROCEDURE — 2060000000 HC ICU INTERMEDIATE R&B

## 2021-08-13 PROCEDURE — 6360000002 HC RX W HCPCS: Performed by: INTERNAL MEDICINE

## 2021-08-13 PROCEDURE — 2500000003 HC RX 250 WO HCPCS: Performed by: INTERNAL MEDICINE

## 2021-08-13 RX ORDER — ONDANSETRON 4 MG/1
4 TABLET, ORALLY DISINTEGRATING ORAL EVERY 8 HOURS PRN
Status: DISCONTINUED | OUTPATIENT
Start: 2021-08-13 | End: 2021-08-15 | Stop reason: HOSPADM

## 2021-08-13 RX ORDER — CITALOPRAM 20 MG/1
40 TABLET ORAL DAILY
Status: DISCONTINUED | OUTPATIENT
Start: 2021-08-13 | End: 2021-08-15 | Stop reason: HOSPADM

## 2021-08-13 RX ORDER — ISOSORBIDE MONONITRATE 30 MG/1
30 TABLET, EXTENDED RELEASE ORAL DAILY
Status: DISCONTINUED | OUTPATIENT
Start: 2021-08-13 | End: 2021-08-15 | Stop reason: HOSPADM

## 2021-08-13 RX ORDER — HYDROXYZINE PAMOATE 25 MG/1
50 CAPSULE ORAL NIGHTLY PRN
Status: DISCONTINUED | OUTPATIENT
Start: 2021-08-13 | End: 2021-08-15 | Stop reason: HOSPADM

## 2021-08-13 RX ORDER — HEPARIN SODIUM 5000 [USP'U]/ML
5000 INJECTION, SOLUTION INTRAVENOUS; SUBCUTANEOUS EVERY 8 HOURS SCHEDULED
Status: DISCONTINUED | OUTPATIENT
Start: 2021-08-13 | End: 2021-08-15 | Stop reason: HOSPADM

## 2021-08-13 RX ORDER — ACETAMINOPHEN 325 MG/1
650 TABLET ORAL EVERY 6 HOURS PRN
Status: DISCONTINUED | OUTPATIENT
Start: 2021-08-13 | End: 2021-08-15 | Stop reason: HOSPADM

## 2021-08-13 RX ORDER — CALCIUM CARBONATE 200(500)MG
1 TABLET,CHEWABLE ORAL 3 TIMES DAILY PRN
Status: DISCONTINUED | OUTPATIENT
Start: 2021-08-13 | End: 2021-08-15 | Stop reason: HOSPADM

## 2021-08-13 RX ORDER — TIZANIDINE 4 MG/1
4 TABLET ORAL 3 TIMES DAILY
Status: DISCONTINUED | OUTPATIENT
Start: 2021-08-13 | End: 2021-08-15 | Stop reason: HOSPADM

## 2021-08-13 RX ORDER — PRAVASTATIN SODIUM 40 MG
40 TABLET ORAL NIGHTLY
Status: DISCONTINUED | OUTPATIENT
Start: 2021-08-13 | End: 2021-08-15 | Stop reason: HOSPADM

## 2021-08-13 RX ORDER — HYDRALAZINE HYDROCHLORIDE 25 MG/1
25 TABLET, FILM COATED ORAL 3 TIMES DAILY
Status: DISCONTINUED | OUTPATIENT
Start: 2021-08-13 | End: 2021-08-15 | Stop reason: HOSPADM

## 2021-08-13 RX ORDER — TRAZODONE HYDROCHLORIDE 50 MG/1
50 TABLET ORAL NIGHTLY
Status: DISCONTINUED | OUTPATIENT
Start: 2021-08-13 | End: 2021-08-15 | Stop reason: HOSPADM

## 2021-08-13 RX ORDER — TORSEMIDE 100 MG/1
100 TABLET ORAL DAILY
Status: DISCONTINUED | OUTPATIENT
Start: 2021-08-13 | End: 2021-08-15 | Stop reason: HOSPADM

## 2021-08-13 RX ORDER — DILTIAZEM HYDROCHLORIDE 180 MG/1
180 CAPSULE, COATED, EXTENDED RELEASE ORAL DAILY
Status: DISCONTINUED | OUTPATIENT
Start: 2021-08-13 | End: 2021-08-15 | Stop reason: HOSPADM

## 2021-08-13 RX ORDER — CYCLOBENZAPRINE HCL 10 MG
5 TABLET ORAL 3 TIMES DAILY PRN
Status: DISCONTINUED | OUTPATIENT
Start: 2021-08-13 | End: 2021-08-15 | Stop reason: HOSPADM

## 2021-08-13 RX ORDER — CALCIUM ACETATE 667 MG/1
667 CAPSULE ORAL
Status: DISCONTINUED | OUTPATIENT
Start: 2021-08-13 | End: 2021-08-15 | Stop reason: HOSPADM

## 2021-08-13 RX ORDER — ALBUTEROL SULFATE 2.5 MG/3ML
2.5 SOLUTION RESPIRATORY (INHALATION) EVERY 4 HOURS PRN
Status: DISCONTINUED | OUTPATIENT
Start: 2021-08-13 | End: 2021-08-15 | Stop reason: HOSPADM

## 2021-08-13 RX ORDER — HEPARIN SODIUM 1000 [USP'U]/ML
4000 INJECTION, SOLUTION INTRAVENOUS; SUBCUTANEOUS PRN
Status: DISCONTINUED | OUTPATIENT
Start: 2021-08-13 | End: 2021-08-15 | Stop reason: HOSPADM

## 2021-08-13 RX ORDER — ACETAMINOPHEN 650 MG/1
650 SUPPOSITORY RECTAL EVERY 6 HOURS PRN
Status: DISCONTINUED | OUTPATIENT
Start: 2021-08-13 | End: 2021-08-15 | Stop reason: HOSPADM

## 2021-08-13 RX ORDER — PANTOPRAZOLE SODIUM 40 MG/1
40 TABLET, DELAYED RELEASE ORAL
Status: DISCONTINUED | OUTPATIENT
Start: 2021-08-14 | End: 2021-08-15 | Stop reason: HOSPADM

## 2021-08-13 RX ORDER — LANOLIN ALCOHOL/MO/W.PET/CERES
3 CREAM (GRAM) TOPICAL NIGHTLY PRN
Status: DISCONTINUED | OUTPATIENT
Start: 2021-08-13 | End: 2021-08-15 | Stop reason: HOSPADM

## 2021-08-13 RX ORDER — SODIUM CHLORIDE 0.9 % (FLUSH) 0.9 %
10 SYRINGE (ML) INJECTION PRN
Status: DISCONTINUED | OUTPATIENT
Start: 2021-08-13 | End: 2021-08-15 | Stop reason: HOSPADM

## 2021-08-13 RX ORDER — LOSARTAN POTASSIUM 25 MG/1
50 TABLET ORAL DAILY
Status: DISCONTINUED | OUTPATIENT
Start: 2021-08-13 | End: 2021-08-15 | Stop reason: HOSPADM

## 2021-08-13 RX ORDER — QUETIAPINE FUMARATE 25 MG/1
50 TABLET, FILM COATED ORAL NIGHTLY
Status: DISCONTINUED | OUTPATIENT
Start: 2021-08-13 | End: 2021-08-15 | Stop reason: HOSPADM

## 2021-08-13 RX ORDER — SODIUM CHLORIDE 9 MG/ML
25 INJECTION, SOLUTION INTRAVENOUS PRN
Status: DISCONTINUED | OUTPATIENT
Start: 2021-08-13 | End: 2021-08-15 | Stop reason: HOSPADM

## 2021-08-13 RX ORDER — ASPIRIN 81 MG/1
81 TABLET, CHEWABLE ORAL DAILY
Status: DISCONTINUED | OUTPATIENT
Start: 2021-08-13 | End: 2021-08-15 | Stop reason: HOSPADM

## 2021-08-13 RX ORDER — NITROGLYCERIN 20 MG/100ML
5-200 INJECTION INTRAVENOUS CONTINUOUS
Status: DISCONTINUED | OUTPATIENT
Start: 2021-08-13 | End: 2021-08-15 | Stop reason: HOSPADM

## 2021-08-13 RX ORDER — ONDANSETRON 2 MG/ML
4 INJECTION INTRAMUSCULAR; INTRAVENOUS EVERY 6 HOURS PRN
Status: DISCONTINUED | OUTPATIENT
Start: 2021-08-13 | End: 2021-08-15 | Stop reason: HOSPADM

## 2021-08-13 RX ADMIN — CEFEPIME HYDROCHLORIDE 1000 MG: 1 INJECTION, POWDER, FOR SOLUTION INTRAMUSCULAR; INTRAVENOUS at 00:30

## 2021-08-13 RX ADMIN — ASPIRIN 81 MG: 81 TABLET, CHEWABLE ORAL at 18:31

## 2021-08-13 RX ADMIN — ISOSORBIDE MONONITRATE 30 MG: 30 TABLET, EXTENDED RELEASE ORAL at 18:30

## 2021-08-13 RX ADMIN — TRAZODONE HYDROCHLORIDE 50 MG: 50 TABLET ORAL at 20:55

## 2021-08-13 RX ADMIN — INSULIN LISPRO 15 UNITS: 100 INJECTION, SOLUTION INTRAVENOUS; SUBCUTANEOUS at 18:35

## 2021-08-13 RX ADMIN — VANCOMYCIN HYDROCHLORIDE 1250 MG: 1 INJECTION, POWDER, LYOPHILIZED, FOR SOLUTION INTRAVENOUS at 01:31

## 2021-08-13 RX ADMIN — INSULIN GLARGINE 60 UNITS: 100 INJECTION, SOLUTION SUBCUTANEOUS at 00:31

## 2021-08-13 RX ADMIN — INSULIN LISPRO 12 UNITS: 100 INJECTION, SOLUTION INTRAVENOUS; SUBCUTANEOUS at 07:45

## 2021-08-13 RX ADMIN — QUETIAPINE FUMARATE 50 MG: 25 TABLET ORAL at 20:56

## 2021-08-13 RX ADMIN — OXYCODONE AND ACETAMINOPHEN 1 TABLET: 7.5; 325 TABLET ORAL at 20:55

## 2021-08-13 RX ADMIN — TIZANIDINE 4 MG: 4 TABLET ORAL at 20:55

## 2021-08-13 RX ADMIN — OXYCODONE AND ACETAMINOPHEN 1 TABLET: 7.5; 325 TABLET ORAL at 14:54

## 2021-08-13 RX ADMIN — HYDRALAZINE HYDROCHLORIDE 25 MG: 25 TABLET, FILM COATED ORAL at 18:30

## 2021-08-13 RX ADMIN — OXYCODONE AND ACETAMINOPHEN 1 TABLET: 7.5; 325 TABLET ORAL at 00:31

## 2021-08-13 RX ADMIN — HEPARIN SODIUM 5000 UNITS: 5000 INJECTION INTRAVENOUS; SUBCUTANEOUS at 18:36

## 2021-08-13 RX ADMIN — TORSEMIDE 100 MG: 100 TABLET ORAL at 18:30

## 2021-08-13 RX ADMIN — INSULIN LISPRO 18 UNITS: 100 INJECTION, SOLUTION INTRAVENOUS; SUBCUTANEOUS at 04:02

## 2021-08-13 RX ADMIN — CITALOPRAM HYDROBROMIDE 40 MG: 20 TABLET ORAL at 18:30

## 2021-08-13 RX ADMIN — INSULIN LISPRO 18 UNITS: 100 INJECTION, SOLUTION INTRAVENOUS; SUBCUTANEOUS at 00:31

## 2021-08-13 RX ADMIN — PRAVASTATIN SODIUM 40 MG: 40 TABLET ORAL at 20:56

## 2021-08-13 RX ADMIN — INSULIN LISPRO 6 UNITS: 100 INJECTION, SOLUTION INTRAVENOUS; SUBCUTANEOUS at 20:56

## 2021-08-13 RX ADMIN — HEPARIN SODIUM 5000 UNITS: 5000 INJECTION INTRAVENOUS; SUBCUTANEOUS at 20:56

## 2021-08-13 RX ADMIN — CALCIUM ACETATE 667 MG: 667 CAPSULE ORAL at 18:31

## 2021-08-13 RX ADMIN — INSULIN GLARGINE 60 UNITS: 100 INJECTION, SOLUTION SUBCUTANEOUS at 20:57

## 2021-08-13 RX ADMIN — HYDRALAZINE HYDROCHLORIDE 25 MG: 25 TABLET, FILM COATED ORAL at 20:55

## 2021-08-13 RX ADMIN — DILTIAZEM HYDROCHLORIDE 180 MG: 180 CAPSULE, COATED, EXTENDED RELEASE ORAL at 18:29

## 2021-08-13 RX ADMIN — TIZANIDINE 4 MG: 4 TABLET ORAL at 18:30

## 2021-08-13 RX ADMIN — LOSARTAN POTASSIUM 50 MG: 25 TABLET, FILM COATED ORAL at 18:31

## 2021-08-13 ASSESSMENT — PAIN DESCRIPTION - PROGRESSION
CLINICAL_PROGRESSION: NOT CHANGED

## 2021-08-13 ASSESSMENT — PAIN SCALES - GENERAL
PAINLEVEL_OUTOF10: 10
PAINLEVEL_OUTOF10: 0
PAINLEVEL_OUTOF10: 4
PAINLEVEL_OUTOF10: 4
PAINLEVEL_OUTOF10: 7
PAINLEVEL_OUTOF10: 0
PAINLEVEL_OUTOF10: 0
PAINLEVEL_OUTOF10: 8
PAINLEVEL_OUTOF10: 5
PAINLEVEL_OUTOF10: 8
PAINLEVEL_OUTOF10: 6

## 2021-08-13 ASSESSMENT — PAIN DESCRIPTION - LOCATION
LOCATION: GENERALIZED
LOCATION: GENERALIZED

## 2021-08-13 ASSESSMENT — PAIN DESCRIPTION - ONSET: ONSET: ON-GOING

## 2021-08-13 ASSESSMENT — PAIN SCALES - WONG BAKER: WONGBAKER_NUMERICALRESPONSE: 4

## 2021-08-13 NOTE — PROGRESS NOTES
Rosalinda Prakash MD   Patient: henok Smyth   0'\"   8/13/21 2:43 PM   [phone] Hospital or Facility: [facility] From: [facility] RE: henok reeves 1970 RM: [id number] pt back from hd. vss. pt denies cp. pt requesting to eat. ordered npo. please review and advise.  ty Need Callback: NO CALLBACK REQ C4 PROGRESSIVE CARE  Unread

## 2021-08-13 NOTE — FLOWSHEET NOTE
Treatment time: 3.5 hours  Net UF: 2220 ml     Pre weight: 121.5 kg   Post weight: 117.7 kg  EDW: 119 kg     Access used: LCW  Access function: Good with  ml/min     Medications or blood products given: None     Regular outpatient schedule: [address]     Summary of response to treatment: Tolerated tx fair. Had some cramping noted towards end of tx which led to early tx termination.  Cramping resolved once tx ended.      Copy of dialysis treatment record placed in chart, to be scanned into EMR.       08/13/21 0940 08/13/21 1312   Treatment   Time On  --  1008   Time Off  --  1304   Vital Signs   /64 137/76   Temp 97.3 °F (36.3 °C) 97.5 °F (36.4 °C)   Pulse 68 81   Resp 18 18   Dialysis Bath   K+ (Potassium) 4 4   Ca+ (Calcium) 2.25 2.25   Na+ (Sodium) 138 138   HCO3 (Bicarb) 32 32

## 2021-08-13 NOTE — PROGRESS NOTES
Silverio Yeager   Patient: henok reeves   0'\"   8/13/21 2:29 PM   [phone] Hospital or Facility: [facility] From: [facility] RE: henok reeves 1970 RM: [id number] pt settled into room 442 from HD. pt requesting to eat and current order from Pamela Hancock is npo. please review and advise.  ty Need Callback: NO CALLBACK REQ C4 PROGRESSIVE CARE  Unread

## 2021-08-13 NOTE — CONSULTS
[facility]  Nephrology Consult Note           Reason for Consult:  ESRD  Requesting Physician:  Dr. Rut Parish    Chief Complaint:    Chief Complaint   Patient presents with    Shortness of Breath     missed diaylsis today, last was three days ago       History of Present Illness on 8/13/21:    46 y.o. yo male with PMH of ESRD, HTN who is admitted for resp failure  He missed his HD on 8/11 d/t his ride not coming to pick him up  He presented to ED w increased sob and felt better on BIPAP; also needed ntg gtt.  Both have been weaned off at HD  HD performed with 2.5l UF on 8/13 and had to be shortened to 2h 50m d/t severe cramps    Past Medical History:        Diagnosis Date    Ambulatory dysfunction     walker for long distances, SOB with distance    Aortic stenosis     echo 2017    Arthritis     hands and hips    Asthma     Bilateral hilar adenopathy syndrome 6/3/2013    CAD (coronary artery disease)     Dr. Darlin Kennedy [facility]) 04/19/2019    EF= 43%    CHF (congestive heart failure) (HCC)     Chronic pain     COPD (chronic obstructive pulmonary disease) ([address]     pulmonology Dr. Rm Anis    Depression     Diabetes mellitus ([address]     borderline    Difficult intravenous access     Emphysema of lung ([address]     ESRD (end stage renal disease) on dialysis ([address]     MWF    Fear of needles     Gastric ulcer     GERD (gastroesophageal reflux disease)     Heart valve problem     bicuspic valve    Hemodialysis patient ([address]     History of spinal fracture     work incident    Hx of blood clots     Bilateral lower extremities; stents in place    Hyperlipidemia     Hypertension     MI (myocardial infarction) ([address] 2019    has had 9 MIs. 2019 was the last    Neuromuscular disorder ([address]     due to CVA    Numbness and tingling in left arm     from fistula    Pneumonia     PONV (postoperative nausea and vomiting)     Prolonged emergence from general anesthesia     States current facility-administered medications on file prior to encounter. Current Outpatient Medications on File Prior to Encounter   Medication Sig Dispense Refill    torsemide (DEMADEX) 100 MG tablet TAKE 1 TO 2 TABLETS BY MOUTH DAILY 180 tablet 3    Calcium Acetate, Phos Binder, 667 MG CAPS TAKE 1 CAPSULE BY MOUTH THREE TIMES DAILY WITH MEALS 90 capsule 3    pravastatin (PRAVACHOL) 40 MG tablet TAKE (1) TABLET BY MOUTH IN THE EVENING 30 tablet 1    QUEtiapine (SEROQUEL) 50 MG tablet TAKE (1) TABLET BY MOUTH IN THE EVENING 30 tablet 2    oxyCODONE-acetaminophen (PERCOCET) 7.5-325 MG per tablet Take 1 tablet by mouth every 4-6 hours as needed for Pain for up to 30 days. 150 tablet 0    BASAGLAR KWIKPEN 100 UNIT/ML injection pen ADMINISTER 60 UNITS UNDER THE SKIN EVERY NIGHT 15 mL 5    hydrocortisone (ANUSOL-HC) 2.5 % CREA rectal cream Place rectally 2 times daily 30 g 0    albuterol (PROVENTIL) (2.5 MG/3ML) 0.083% nebulizer solution INHALE 1 VIAL VIA NEBULIZER EVERY 6 HOURS AS NEEDED FOR WHEEZING 300 mL 5    nystatin (MYCOSTATIN) 932633 UNIT/GM cream Apply topically 2 times daily. 60 g 3    famotidine (PEPCID) 20 MG tablet TAKE 1 TABLET BY MOUTH TWICE DAILY AS NEEDED FOR HEARTBURN 180 tablet 0    hydrOXYzine (VISTARIL) 50 MG capsule TAKE 1 TO 2 CAPSULES BY MOUTH NIGHTLY 60 capsule 5    gabapentin (NEURONTIN) 100 MG capsule TAKE 1 TO 2 CAPSULES BY MOUTH THREE TIMES A  capsule 5    LINZESS 145 MCG capsule TAKE 1 CAPSULE BY MOUTH EVERY MORNING BEFORE BREAKFAST 30 capsule 10    hydrALAZINE (APRESOLINE) 50 MG tablet TAKE 1/2 TABLET BY MOUTH EVERY 8 HOURS 90 tablet 2    traZODone (DESYREL) 150 MG tablet TAKE (1) TABLET BY MOUTH NIGHTLY 30 tablet 10    predniSONE (DELTASONE) 20 MG tablet Take 3 tablets daily for 3 days, 2 tablets daily for 3 days and 1 tablet daily for 3 days.  18 tablet 0    dilTIAZem (CARDIZEM CD) 180 MG extended release capsule       cyclobenzaprine (FLEXERIL) 10 MG tablet TAKE 1 TABLET BY MOUTH EVERY 8 HOURS AS NEEDED 90 tablet 5    DULoxetine (CYMBALTA) 30 MG extended release capsule TAKE 1 CAPSULE BY MOUTH EVERY DAY 30 capsule 10    tiZANidine (ZANAFLEX) 4 MG tablet TAKE 1 TABLET BY MOUTH THREE TIMES DAILY 90 tablet 10    losartan (COZAAR) 50 MG tablet TAKE 1 TABLET BY MOUTH DAILY 30 tablet 10    pantoprazole (PROTONIX) 40 MG tablet TAKE (1) TABLET BY MOUTH EACH MORNING BEFORE BREAKFAST 90 tablet 1    simethicone (MYLICON) 80 MG chewable tablet Take 1 tablet by mouth every 6 hours as needed (cramping) 15 tablet 0    glucose monitoring kit (FREESTYLE) monitoring kit 1 kit by Does not apply route daily 1 kit 0    blood glucose test strips (GLUCOSE METER TEST) strip 1 each by In Vitro route 5 times daily As needed. 100 each 3    nitroGLYCERIN (NITROSTAT) 0.4 MG SL tablet DISSOLVE 1 TABLET UNDER THE TONGUE AS NEEDED FOR CHEST PAIN EVERY 5 MINUTES UP TO 3 TIMES. IF NO RELIEF CALL 911. 25 tablet 10    B Complex-C-Folic Acid (VIRT-CAPS) 1 MG CAPS TK ONE C PO  QD 90 capsule 1    citalopram (CELEXA) 40 MG tablet TAKE (1) TABLET BY MOUTH DAILY 30 tablet 10    insulin aspart (NOVOLOG FLEXPEN) 100 UNIT/ML injection pen Inject 20 Units into the skin 3 times daily (before meals) 15 pen 5    isosorbide mononitrate (IMDUR) 30 MG extended release tablet TAKE 1 TABLET BY MOUTH EVERY DAY 90 tablet 10    ondansetron (ZOFRAN ODT) 4 MG disintegrating tablet Take 1 tablet by mouth every 8 hours as needed for Nausea 60 tablet 0    blood glucose test strips (FREESTYLE LITE) strip Daily As needed.  100 strip 3    glucose monitoring kit (FREESTYLE) monitoring kit 1 kit by Does not apply route daily 1 kit 0    vitamin D (ERGOCALCIFEROL) 56286 units CAPS capsule TK 1 C PO WEEKLY  11    flunisolide (NASALIDE) 25 MCG/ACT (0.025%) SOLN Inhale 2 sprays into the lungs every 12 hours 1 Bottle 5    Tiotropium Bromide-Olodaterol (STIOLTO RESPIMAT) 2.5-2.5 MCG/ACT AERS Inhale 2 puffs into the lungs daily 2 Inhaler 0    Polyethylene Glycol 3350 GRAN       Glucose Blood (BLOOD GLUCOSE TEST STRIPS) STRP TEST 3-4 TIMES DAILY, AS DIRECTED 100 strip 3    Blood Glucose Monitoring Suppl ADAM USE AS DIRECTED. 1 Device 0    Alcohol Swabs PADS USE AS DIRECTED 300 each 3    albuterol sulfate  (90 Base) MCG/ACT inhaler Inhale 2 puffs into the lungs every 6 hours as needed for Wheezing 1 Inhaler 3    ipratropium-albuterol (DUONEB) 0.5-2.5 (3) MG/3ML SOLN nebulizer solution Inhale 3 mLs into the lungs every 6 hours as needed for Shortness of Breath 360 mL 1    calcium carbonate (TUMS) 500 MG chewable tablet Take 1 tablet by mouth 3 times daily as needed for Heartburn.  aspirin 81 MG chewable tablet Take 1 tablet by mouth daily.  (Patient taking differently: Take 81 mg by mouth daily Indications: stopped on  for surgery ) 30 tablet 2       Allergies:  Morphine    Social History:    Social History     Socioeconomic History    Marital status:      Spouse name: Not on file    Number of children: Not on file    Years of education: Not on file    Highest education level: Not on file   Occupational History    Not on file   Tobacco Use    Smoking status: Current Every Day Smoker     Packs/day: 0.50     Years: 33.00     Pack years: 16.50     Types: Cigarettes     Last attempt to quit: 2020     Years since quittin.2    Smokeless tobacco: Never Used   Vaping Use    Vaping Use: Never used   Substance and Sexual Activity    Alcohol use: Not Currently     Alcohol/week: 0.0 standard drinks     Comment: occ    Drug use: No    Sexual activity: Yes     Partners: Female     Comment:    Other Topics Concern    Not on file   Social History Narrative    Not on file     Social Determinants of Health     Financial Resource Strain:     Difficulty of Paying Living Expenses:    Food Insecurity:     Worried About Running Out of Food in the Last Year:     [address] in the Last Year: LABGLOM 9*  --  8*   GFRAA 11*  --  9*       Assessment  -ESRD on HD MWF   -Acute resp failure on BIPAP from volume overload  -DM, uncontrolled  - Hyponatremia from vol overload and hyperglycemia  -hypokalemia- no need to replace k  -Anemia  -CKD/MBD  -HTN    Plan  -HD per schedule   S/p 2.5l net UF on 8/13   Current OP TW of 119 kg    Challenge his TW as able; couldn't do it on 8/13 d/t cramps  -renal dose meds  -dispo per IM, ok to dc from renal std pt    Thank you for the consultation. Please do not hesitate to call with questions.     Rosalina Sarkar MD  [facility]  Office: [phone]  Fax:    [phone]

## 2021-08-13 NOTE — ED NOTES
RN spoke with Nephrology MD about getting the PT to dialysis, informed MD that a bed is not available on the floor and the PT is a boarder in the ED. RN spoke with dialysis RN and charge who stated that PT could not come to dialysis while on a nitroglycerine drip. MD states he will call dialysis to see if RN will take PT. Pt not able to have dialysis in the ER room.        Demi López RN  08/13/21 [id number]

## 2021-08-13 NOTE — PROGRESS NOTES
08/13/21 0407   NIV Type   Skin Assessment Clean, dry, & intact   Skin Protection for O2 Device Yes   Location Nose   Equipment Type [id number]   Mode Bilevel   Mask Type Full face mask   Mask Size Large   Settings/Measurements   IPAP 20 cmH20   CPAP/EPAP 10 cmH2O   Resp 15   FiO2  85 %   I Time/ I Time % 1 s   Vt Exhaled 671 mL   Minute Volume 16.1 Liters   Mask Leak (lpm) 20 lpm   Comfort Level Good   Using Accessory Muscles No   SpO2 100

## 2021-08-13 NOTE — ED NOTES
Called Patients Nephrologists office @[phone]  Per Yanira Ross returned page @[phone]       Flor Paiz  08/12/21 8162

## 2021-08-13 NOTE — ED PROVIDER NOTES
CHIEF COMPLAINT  Shortness of Breath (missed diaylsis today, last was three days ago)      HISTORY OF PRESENT ILLNESS  Mercedez Flores is a 46 y.o. male with a history of end-stage renal disease he is on dialysis Monday Wednesday Friday, type 2 diabetes, CAD, asthma, COPD, CVA who presents emergency department for evaluation of shortness of breath. Patient presents emergency department per EMS for evaluation of shortness of breath, respiratory distress. Per EMS, when they initially arrived, patient was saturating in the low 80s. They initially placed him on nasal cannula however he continued to desaturate into the high 70s and they placed him on positive pressure. Patient states that he missed his dialysis session today. Patient currently is in severe respiratory distress and is not able to contribute to history. He does state though that he has chest pain. No other complaints, modifying factors or associated symptoms. I have reviewed the following from the nursing documentation.     Past Medical History:   Diagnosis Date    Ambulatory dysfunction     walker for long distances, SOB with distance    Aortic stenosis     echo 2017    Arthritis     hands and hips    Asthma     Bilateral hilar adenopathy syndrome 6/3/2013    CAD (coronary artery disease)     Dr. Kristan Ferrer [facility]) 04/19/2019    EF= 43%    CHF (congestive heart failure) (HCC)     Chronic pain     COPD (chronic obstructive pulmonary disease) ([address]     pulmonology Dr. Margaret Srivastava    Depression     Diabetes mellitus ([address]     borderline    Difficult intravenous access     Emphysema of lung ([address]     ESRD (end stage renal disease) on dialysis ([address]     MWF    Fear of needles     Gastric ulcer     GERD (gastroesophageal reflux disease)     Heart valve problem     bicuspic valve    Hemodialysis patient ([address]     History of spinal fracture     work incident    Hx of blood clots     Bilateral lower extremities; stents in place    Hyperlipidemia     Hypertension     MI (myocardial infarction) ([address] 2019    has had 9 MIs. 2019 was the last    Neuromuscular disorder ([address]     due to CVA    Numbness and tingling in left arm     from fistula    Pneumonia     PONV (postoperative nausea and vomiting)     Prolonged emergence from general anesthesia     States requires more medication than most people    Sleep apnea     Uses CPAP    Stroke ([address]     7mm thalamic cva 2017 deficts left side, left side weakness    TIA (transient ischemic attack)     Unspecified diseases of blood and blood-forming organs      Past Surgical History:   Procedure Laterality Date    AORTIC VALVE REPLACEMENT N/A 10/15/2019    TRANSCATHETER AORTIC VALVE REPLACEMENT FEMORAL APPROACH performed by Maine Perez MD at [address] [place] [address] Right 7/2/2019    PERITONEAL DIALYSIS CATHETER REMOVAL performed by Tamar Jacob MD at [facility]  2/29/2015    WN    CORONARY ANGIOPLASTY WITH STENT PLACEMENT  05/26/15    CYST REMOVAL  08/14/2013    EXCISION CYSTS, NECK X2 AND ABDOMINAL benign    DIAGNOSTIC CARDIAC CATH LAB PROCEDURE      DIALYSIS FISTULA CREATION Left 10/30/2017    LEFT BRACHIAL CEPHALIC FISTULA    DIALYSIS FISTULA CREATION Left 3/27/2019    LIGATION  AV FISTULA performed by Jana Lopez MD at [facility]. [address], COLON, DIAGNOSTIC      OTHER SURGICAL HISTORY  02/01/2017    laparoscopic cholecystectomy with intraoperative cholangiogram    OTHER SURGICAL HISTORY  2018    PORT PLACEMENT  - vas cath    OTHER SURGICAL HISTORY Bilateral 06/26/2018    laprascopic peritoneal dialysis catheter placement    OTHER SURGICAL HISTORY Right 09/2018    peritoneal dialysis port placed on right side of abdomen    OTHER SURGICAL HISTORY  05/28/2019    PTA/Stenting R External Iliac artery    DE LAP INSERTION TUNNELED INTRAPERITONEAL CATHETER N/A 9/21/2018    LAPAROSCOPIC PERITONEAL DIALYSIS CATHETER REPLACEMENT performed by Yariel Rodriguez MD at [address] ENDOSCOPY  2016    UPPER GASTROINTESTINAL ENDOSCOPY  2017    possible candida, otherwise normal appearing    VASCULAR SURGERY  aprx 2 years ago    2 stents placed, each side of groin     Family History   Problem Relation Age of Onset    Diabetes Mother     Heart Disease Father     Kidney Disease Sister         stage 4-kidney failure    Cancer Sister     Heart Disease Sister     Obesity Sister     Cancer Sister     Heart Disease Sister     Obesity Sister     Alcohol Abuse Brother      Social History     Socioeconomic History    Marital status:      Spouse name: Not on file    Number of children: Not on file    Years of education: Not on file    Highest education level: Not on file   Occupational History    Not on file   Tobacco Use    Smoking status: Current Every Day Smoker     Packs/day: 0.50     Years: 33.00     Pack years: 16.50     Types: Cigarettes     Last attempt to quit: 2020     Years since quittin.2    Smokeless tobacco: Never Used   Vaping Use    Vaping Use: Never used   Substance and Sexual Activity    Alcohol use: Not Currently     Alcohol/week: 0.0 standard drinks     Comment: occ    Drug use: No    Sexual activity: Yes     Partners: Female     Comment:    Other Topics Concern    Not on file   Social History Narrative    Not on file     Social Determinants of Health     Financial Resource Strain:     Difficulty of Paying Living Expenses:    Food Insecurity:     Worried About Running Out of Food in the Last Year:     Ran Out of Food in the Last Year:    Transportation Needs:     Lack of Transportation (Medical):      Lack of Transportation (Non-Medical):    Physical Activity:     Days of Exercise per Week:     Minutes of Exercise per Session:    Stress:     Feeling of Stress :    Social Connections:  Frequency of Communication with Friends and Family:     Frequency of Social Gatherings with Friends and Family:     Attends Mandaen Services:     Active Member of Clubs or Organizations:     Attends Club or Organization Meetings:     Marital Status:    Intimate Partner Violence:     Fear of Current or Ex-Partner:     Emotionally Abused:     Physically Abused:     Sexually Abused:      Current Facility-Administered Medications   Medication Dose Route Frequency Provider Last Rate Last Admin    cefepime (MAXIPIME) 1000 mg IVPB minibag  1,000 mg Intravenous Q12H Zev Guzmán MD   Stopped at 08/13/21 0100    insulin lispro (HUMALOG) injection vial 15 Units  15 Units Subcutaneous Once Kalyan Rebolledo MD        nitroGLYCERIN 50 mg in dextrose 5% 250 mL infusion  5-200 mcg/min Intravenous Continuous Zev Guzmán MD 7.5 mL/hr at 08/13/21 0034 25 mcg/min at 08/13/21 0034    glucose (GLUTOSE) 40 % oral gel 15 g  15 g Oral PRN Anahi Haq MD        dextrose 50 % IV solution  12.5 g Intravenous PRN Anahi Haq MD        glucagon (rDNA) injection 1 mg  1 mg Intramuscular PRN Anahi Haq MD        dextrose 5 % solution  100 mL/hr Intravenous PRN Anahi Haq MD        insulin glargine (LANTUS) injection vial 60 Units  60 Units Subcutaneous Nightly Anahi Haq MD   60 Units at 08/13/21 0031    insulin lispro (HUMALOG) injection vial 15 Units  15 Units Subcutaneous TID  Anahi Haq MD        insulin lispro (HUMALOG) injection vial 0-18 Units  0-18 Units Subcutaneous TID  Anahi Haq MD        insulin lispro (HUMALOG) injection vial 0-9 Units  0-9 Units Subcutaneous Nightly Anahi Haq MD        insulin lispro (HUMALOG) injection vial 0-18 Units  0-18 Units Subcutaneous Q4H Anahi Haq MD   18 Units at 08/13/21 0402    oxyCODONE-acetaminophen (PERCOCET) 7.5-325 MG per tablet 1 tablet  1 tablet Oral Q6H PRKASANDRA Haq MD   1 tablet at 08/13/21 0031     Current Outpatient Medications   Medication Sig Dispense Refill    torsemide (DEMADEX) 100 MG tablet TAKE 1 TO 2 TABLETS BY MOUTH DAILY 180 tablet 3    Calcium Acetate, Phos Binder, 667 MG CAPS TAKE 1 CAPSULE BY MOUTH THREE TIMES DAILY WITH MEALS 90 capsule 3    pravastatin (PRAVACHOL) 40 MG tablet TAKE (1) TABLET BY MOUTH IN THE EVENING 30 tablet 1    QUEtiapine (SEROQUEL) 50 MG tablet TAKE (1) TABLET BY MOUTH IN THE EVENING 30 tablet 2    oxyCODONE-acetaminophen (PERCOCET) 7.5-325 MG per tablet Take 1 tablet by mouth every 4-6 hours as needed for Pain for up to 30 days. 150 tablet 0    BASAGLAR KWIKPEN 100 UNIT/ML injection pen ADMINISTER 60 UNITS UNDER THE SKIN EVERY NIGHT 15 mL 5    hydrocortisone (ANUSOL-HC) 2.5 % CREA rectal cream Place rectally 2 times daily 30 g 0    albuterol (PROVENTIL) (2.5 MG/3ML) 0.083% nebulizer solution INHALE 1 VIAL VIA NEBULIZER EVERY 6 HOURS AS NEEDED FOR WHEEZING 300 mL 5    nystatin (MYCOSTATIN) 132628 UNIT/GM cream Apply topically 2 times daily. 60 g 3    famotidine (PEPCID) 20 MG tablet TAKE 1 TABLET BY MOUTH TWICE DAILY AS NEEDED FOR HEARTBURN 180 tablet 0    hydrOXYzine (VISTARIL) 50 MG capsule TAKE 1 TO 2 CAPSULES BY MOUTH NIGHTLY 60 capsule 5    gabapentin (NEURONTIN) 100 MG capsule TAKE 1 TO 2 CAPSULES BY MOUTH THREE TIMES A  capsule 5    LINZESS 145 MCG capsule TAKE 1 CAPSULE BY MOUTH EVERY MORNING BEFORE BREAKFAST 30 capsule 10    hydrALAZINE (APRESOLINE) 50 MG tablet TAKE 1/2 TABLET BY MOUTH EVERY 8 HOURS 90 tablet 2    traZODone (DESYREL) 150 MG tablet TAKE (1) TABLET BY MOUTH NIGHTLY 30 tablet 10    predniSONE (DELTASONE) 20 MG tablet Take 3 tablets daily for 3 days, 2 tablets daily for 3 days and 1 tablet daily for 3 days.  18 tablet 0    dilTIAZem (CARDIZEM CD) 180 MG extended release capsule       cyclobenzaprine (FLEXERIL) 10 MG tablet TAKE 1 TABLET BY MOUTH EVERY 8 HOURS AS NEEDED 90 tablet 5    DULoxetine (CYMBALTA) 30 MG extended release capsule TAKE 1 CAPSULE BY MOUTH EVERY DAY 30 capsule 10    tiZANidine (ZANAFLEX) 4 MG tablet TAKE 1 TABLET BY MOUTH THREE TIMES DAILY 90 tablet 10    losartan (COZAAR) 50 MG tablet TAKE 1 TABLET BY MOUTH DAILY 30 tablet 10    pantoprazole (PROTONIX) 40 MG tablet TAKE (1) TABLET BY MOUTH EACH MORNING BEFORE BREAKFAST 90 tablet 1    simethicone (MYLICON) 80 MG chewable tablet Take 1 tablet by mouth every 6 hours as needed (cramping) 15 tablet 0    glucose monitoring kit (FREESTYLE) monitoring kit 1 kit by Does not apply route daily 1 kit 0    blood glucose test strips (GLUCOSE METER TEST) strip 1 each by In Vitro route 5 times daily As needed. 100 each 3    nitroGLYCERIN (NITROSTAT) 0.4 MG SL tablet DISSOLVE 1 TABLET UNDER THE TONGUE AS NEEDED FOR CHEST PAIN EVERY 5 MINUTES UP TO 3 TIMES. IF NO RELIEF CALL 911. 25 tablet 10    B Complex-C-Folic Acid (VIRT-CAPS) 1 MG CAPS TK ONE C PO  QD 90 capsule 1    citalopram (CELEXA) 40 MG tablet TAKE (1) TABLET BY MOUTH DAILY 30 tablet 10    insulin aspart (NOVOLOG FLEXPEN) 100 UNIT/ML injection pen Inject 20 Units into the skin 3 times daily (before meals) 15 pen 5    isosorbide mononitrate (IMDUR) 30 MG extended release tablet TAKE 1 TABLET BY MOUTH EVERY DAY 90 tablet 10    ondansetron (ZOFRAN ODT) 4 MG disintegrating tablet Take 1 tablet by mouth every 8 hours as needed for Nausea 60 tablet 0    blood glucose test strips (FREESTYLE LITE) strip Daily As needed.  100 strip 3    glucose monitoring kit (FREESTYLE) monitoring kit 1 kit by Does not apply route daily 1 kit 0    vitamin D (ERGOCALCIFEROL) 80292 units CAPS capsule TK 1 C PO WEEKLY  11    flunisolide (NASALIDE) 25 MCG/ACT (0.025%) SOLN Inhale 2 sprays into the lungs every 12 hours 1 Bottle 5    Tiotropium Bromide-Olodaterol (STIOLTO RESPIMAT) 2.5-2.5 MCG/ACT AERS Inhale 2 puffs into the lungs daily 2 Inhaler 0    Polyethylene Glycol 3350 GRAN       Glucose Blood (BLOOD GLUCOSE TEST STRIPS) STRP TEST 3-4 TIMES DAILY, AS DIRECTED 100 strip 3    Blood Glucose Monitoring Suppl ADAM USE AS DIRECTED. 1 Device 0    Alcohol Swabs PADS USE AS DIRECTED 300 each 3    albuterol sulfate  (90 Base) MCG/ACT inhaler Inhale 2 puffs into the lungs every 6 hours as needed for Wheezing 1 Inhaler 3    ipratropium-albuterol (DUONEB) 0.5-2.5 (3) MG/3ML SOLN nebulizer solution Inhale 3 mLs into the lungs every 6 hours as needed for Shortness of Breath 360 mL 1    calcium carbonate (TUMS) 500 MG chewable tablet Take 1 tablet by mouth 3 times daily as needed for Heartburn.  aspirin 81 MG chewable tablet Take 1 tablet by mouth daily. (Patient taking differently: Take 81 mg by mouth daily Indications: stopped on 6/25 for surgery ) 30 tablet 2     Allergies   Allergen Reactions    Morphine Nausea And Vomiting       REVIEW OF SYSTEMS  10 systems reviewed, pertinent positives per HPI otherwise noted to be negative. PHYSICAL EXAM  /68   Pulse 76   Temp 98.9 °F (37.2 °C) (Axillary)   Resp 20   SpO2 100%   GENERAL APPEARANCE: Awake and alert. Cooperative. In moderate distress secondary to respiratory distress  HEAD: Normocephalic. Atraumatic. EYES: PERRL. EOM's grossly intact. NECK: Supple, trachea midline. HEART: RRR. No harsh murmurs. Intact radial pulses 2+ bilaterally. LUNGS: Respirations are severely labored with accessory muscle usage. Able to speak in one-word sentences. ABDOMEN: Soft. Non-distended. Non-tender. No guarding or rebound. EXTREMITIES: Mild bilateral lower extremity edema. .No acute deformities. SKIN: Warm and dry. No acute rashes. NEUROLOGICAL: Alert and oriented X 3. No focal neurological deficits  PSYCHIATRIC: Normal mood and affect. LABS  I have reviewed all labs for this visit.    Results for orders placed or performed during the hospital encounter of 08/12/21   CBC auto differential   Result Value Ref Range    WBC 20.5 (H) 4.0 - 11.0 K/uL    RBC 3.92 (L) 4.20 - 5.90 M/uL    Hemoglobin 11.9 (L) 13.5 - 17.5 g/dL    Hematocrit 37.2 (L) 40.5 - 52.5 %    MCV 94.9 80.0 - 100.0 fL    MCH 30.4 26.0 - 34.0 pg    MCHC 32.1 31.0 - 36.0 g/dL    RDW 16.5 (H) 12.4 - 15.4 %    Platelets 176 902 - 662 K/uL    MPV 9.4 5.0 - 10.5 fL    Neutrophils % 79.2 %    Lymphocytes % 11.0 %    Monocytes % 6.2 %    Eosinophils % 2.6 %    Basophils % 1.0 %    Neutrophils Absolute 16.2 (H) 1.7 - 7.7 K/uL    Lymphocytes Absolute 2.3 1.0 - 5.1 K/uL    Monocytes Absolute 1.3 0.0 - 1.3 K/uL    Eosinophils Absolute 0.5 0.0 - 0.6 K/uL    Basophils Absolute 0.2 0.0 - 0.2 K/uL   Comprehensive metabolic panel   Result Value Ref Range    Sodium 133 (L) 136 - 145 mmol/L    Potassium 4.2 3.5 - 5.1 mmol/L    Chloride 93 (L) 99 - 110 mmol/L    CO2 21 21 - 32 mmol/L    Anion Gap 19 (H) 3 - 16    Glucose 666 (HH) 70 - 99 mg/dL    BUN 79 (H) 7 - 20 mg/dL    CREATININE 6.4 (HH) 0.9 - 1.3 mg/dL    GFR Non-African American 9 (A) >60    GFR  11 (A) >60    Calcium 8.8 8.3 - 10.6 mg/dL    Total Protein 7.1 6.4 - 8.2 g/dL    Albumin 3.6 3.4 - 5.0 g/dL    Albumin/Globulin Ratio 1.0 (L) 1.1 - 2.2    Total Bilirubin 0.3 0.0 - 1.0 mg/dL    Alkaline Phosphatase 101 40 - 129 U/L    ALT 16 10 - 40 U/L    AST 12 (L) 15 - 37 U/L    Globulin 3.5 g/dL   Troponin   Result Value Ref Range    Troponin 0.18 (H) <0.01 ng/mL   Blood gas, venous   Result Value Ref Range    pH, Blade 7.215 (L) 7.350 - 7.450    pCO2, Blade 54.1 (H) 40.0 - 50.0 mmHg    pO2, Blade 66.8 (H) 25 - 40 mmHg    HCO3, Venous 21.4 (L) 23.0 - 29.0 mmol/L    Base Excess, Blade -6.7 (L) -3.0 - 3.0 mmol/L    O2 Sat, Blade 89 Not Established %    Carboxyhemoglobin 2.4 (H) 0.0 - 1.5 %    MetHgb, Blade 0.3 <1.5 %    TC02 (Calc), Blade 23 Not Established mmol/L    O2 Therapy Unknown    Brain Natriuretic Peptide   Result Value Ref Range    Pro-BNP >70,000 (H) 0 - 124 pg/mL   Lactate, Sepsis   Result Value Ref Range    Lactic Acid, Sepsis 3.1 (H) 0.4 -

## 2021-08-13 NOTE — PROGRESS NOTES
08/12/21 2222   NIV Type   $NIV $Daily Charge   Skin Assessment Clean, dry, & intact   Skin Protection for O2 Device Yes   Location Nose   NIV Started/Stopped On   Equipment Type v60   Mode Bilevel   Mask Type Full face mask   Mask Size Large   Settings/Measurements   IPAP 20 cmH20   CPAP/EPAP 10 cmH2O   Resp 28   FiO2  100 %   I Time/ I Time % 1 s   Vt Exhaled 699 mL   Minute Volume 21 Liters   Mask Leak (lpm) 49 lpm   Comfort Level Good   Using Accessory Muscles Yes   SpO2 99

## 2021-08-13 NOTE — PROGRESS NOTES
08/13/21 1603   RT Protocol   Smoking Status 2   Surgical status 0   Respiratory pattern 0   Mental Status 0   Breath sounds 0   Cough 0   Activity level 0   Oxygen Requirement 0 Refill request sent to Dr. Gold.

## 2021-08-13 NOTE — PROGRESS NOTES
Hospitalist Progress Note      PCP: Kathy Schlatter, MD    Date of Admission: 8/12/2021    Chief Complaint: SOB    Subjective: no new c/o.         Medications:  Reviewed    Infusion Medications    nitroGLYCERIN      sodium chloride      dextrose       Scheduled Medications    aspirin  81 mg Oral Daily    Calcium Acetate (Phos Binder)  667 mg Oral TID WC    citalopram  40 mg Oral Daily    dilTIAZem  180 mg Oral Daily    hydrALAZINE  25 mg Oral TID    isosorbide mononitrate  1 tablet Oral Daily    [START ON 8/14/2021] linaclotide  145 mcg Oral QAM AC    losartan  1 tablet Oral Daily    [START ON 8/14/2021] pantoprazole  40 mg Oral QAM AC    pravastatin  40 mg Oral Nightly    QUEtiapine  50 mg Oral Nightly    tiotropium-olodaterol  2 puff Inhalation Daily    tiZANidine  4 mg Oral TID    torsemide  100 mg Oral Daily    traZODone  50 mg Oral Nightly    heparin (porcine)  5,000 Units Subcutaneous 3 times per day    cefepime  1,000 mg Intravenous Q12H    insulin lispro  15 Units Subcutaneous Once    insulin glargine  60 Units Subcutaneous Nightly    insulin lispro  15 Units Subcutaneous TID WC    insulin lispro  0-18 Units Subcutaneous TID WC    insulin lispro  0-9 Units Subcutaneous Nightly    insulin lispro  0-18 Units Subcutaneous Q4H     PRN Meds: calcium carbonate, cyclobenzaprine, hydrOXYzine, sodium chloride flush, sodium chloride, ondansetron **OR** ondansetron, acetaminophen **OR** acetaminophen, melatonin, albuterol, heparin (porcine), glucose, dextrose, glucagon (rDNA), dextrose, oxyCODONE-acetaminophen      Intake/Output Summary (Last 24 hours) at 8/13/2021 1440  Last data filed at 8/13/2021 0301  Gross per 24 hour   Intake 297.69 ml   Output --   Net 297.69 ml       Physical Exam Performed:    BP (!) 143/87   Pulse 89   Temp 98.2 °F (36.8 °C)   Resp 16   Wt 258 lb (117 kg)   SpO2 99%   BMI 38.10 kg/m²     General appearance: No apparent distress, appears stated age and cooperative. HEENT: Pupils equal, round, and reactive to light. Conjunctivae/corneas clear. Neck: Supple, with full range of motion. No jugular venous distention. Trachea midline. Respiratory:  Normal respiratory effort. Clear to auscultation, bilaterally without Rales/Wheezes/Rhonchi. Cardiovascular: Regular rate and rhythm with normal S1/S2 without murmurs, rubs or gallops. Abdomen: Soft, non-tender, non-distended with normal bowel sounds. Musculoskeletal: No clubbing, cyanosis or edema bilaterally. Full range of motion without deformity. Skin: Skin color, texture, turgor normal.  No rashes or lesions. Neurologic:  Neurovascularly intact without any focal sensory/motor deficits. Cranial nerves: II-XII intact, grossly non-focal.  Psychiatric: Alert and oriented, thought content appropriate, normal insight  Capillary Refill: Brisk,< 3 seconds   Peripheral Pulses: +2 palpable, equal bilaterally       Labs:   Recent Labs     08/12/21 2220 08/13/21  1000   WBC 20.5* 10.9   HGB 11.9* 9.8*   HCT 37.2* 29.2*    172     Recent Labs     08/12/21 2220 08/13/21  1000   * 133*   K 4.2 3.3*   CL 93* 96*   CO2 21 20*   BUN 79* 89*   CREATININE 6.4* 7.5*   CALCIUM 8.8 8.9     Recent Labs     08/12/21 2220   AST 12*   ALT 16   BILITOT 0.3   ALKPHOS 101     No results for input(s): INR in the last 72 hours.   Recent Labs     08/12/21 2220 08/13/21  0130 08/13/21  0707   TROPONINI 0.18* 0.22* 0.23*       Urinalysis:      Lab Results   Component Value Date    NITRU Negative 06/26/2021    WBCUA 6-9 06/26/2021    BACTERIA Rare 06/26/2021    RBCUA 11-20 06/26/2021    BLOODU SMALL 06/26/2021    SPECGRAV 1.015 06/26/2021    GLUCOSEU >=1000 06/26/2021       Consults:    IP CONSULT TO NEPHROLOGY  IP CONSULT TO HOSPITALIST      Assessment/Plan:    Active Hospital Problems    Diagnosis     CAD S/P percutaneous coronary angioplasty [I25.10, Z98.61]      Priority: High    S/P TAVR (transcatheter aortic valve replacement) [Z95.2]      Priority: Medium    Essential hypertension [I10]      Priority: Medium    Chronic obstructive pulmonary disease (HCC) [J44.9]      Priority: Medium    CHF (congestive heart failure) (HCC) [I50.9]     Acute hypoxemic respiratory failure (HCC) [J96.01]     Type 2 diabetes mellitus with hyperglycemia (HCC) [E11.65]     Pulmonary edema [J81.1]     ESRD (end stage renal disease) on dialysis ([facility] [N18.6, Z99.2]     ZAINAB on CPAP [G47.33, Z99.89]     Obesity (BMI 30-39. 9) [E66.9]          Acute respiratory failure, Severe Asthma-COPD, ZAINAB on CPAP  - Respiratory failure due to volume overload in turn d/t ESRD and missing dialysis. - Titrate level of respiratory support according to patient's needs. Target goal SpO2 = 90-94%. - Continue home [employer]. Short acting bronchodilators made available prn.  -  Home CPAP setting is 8 - 12 cm H2O.     ESRD, HTN, anemia of CKD  - Nephrology consulted and appreciated. - Patient is responding adequately to BiPAP support. - Continue NTG infusion titrated to keep SBP = 160 - 180 mmHg. Taper as able, likely after HD.  - Continue home diltiazem, hydralazine, losartan, torsemide, phosLo     CAD s/p PCI,  HFpEF, AS s/p TAVR  - Chronically elevated troponin. Stable  - Stable. Continue aspirin, imdur and statin.      DM2 w/ hyperglycemia  - Hold all oral antidiabetic agents. Start s.c. Insulin regimen based on home regimen.      SIRS - w/ Leukocytosis/Tachycardia//Elevated Lactate POArrival w/out clear evidence of infection. Continue IVF as appropriate and monitor clinical response w/ ABX as written - CXR equivocal.     Obesity -  With Body mass index is 38.32 kg/m². Complicating assessment and treatment. Placing patient at risk for multiple co-morbidities as well as early death and contributing to the patient's presentation. Counseled on weight loss.       DVT Prophylaxis: SQ Heparin     Recent Labs     08/12/21  2220 08/13/21  1000    172 Diet: Diet NPO Exceptions are: Sips of Water with Meds  Code Status: Full Code      PT/OT Eval Status: not yet ordered. Dispo - Likely Sat/Antonino 14/15 August pending clinical course.      Isadore Rinne, MD

## 2021-08-13 NOTE — ED PROVIDER NOTES
Reviewed ECG completed for Leander Garcia. I did not see this patient and was not involved in their care. ECG  The Ekg interpreted by me shows  normal sinus rhythm with a rate of 67  Axis is   Normal  QTc is  prolonged  Intervals and Durations are unremarkable.       ST Segments: nonspecific changes, new T wave inversion in aVL and V6  Change from prior EKG dated 7/27/21       Owen Braswell MD  08/13/21 [id number]

## 2021-08-13 NOTE — ED NOTES
Bed: 02  Expected date:   Expected time:   Means of arrival:   Comments:  [facility] Luis Enrique Aguilar RN  08/12/21 [id number]

## 2021-08-13 NOTE — PROGRESS NOTES
Pt refuses a safety alarm, moves around too much in bed and is independent at home. Pt is steady with a walker. Pt knows to call out for assistance out of bed.

## 2021-08-13 NOTE — RT PROTOCOL NOTE
RT Inhaler-Nebulizer Bronchodilator Protocol Note    There is a bronchodilator order in the chart from a provider indicating to follow the RT Bronchodilator Protocol and there is an Initiate RT Bronchodilator Protocol order as well (see protocol at bottom of note). The findings from the last RT Protocol Assessment were as follows:  Smoking: >15 Pack years  Surgical Status: No surgery  Xray:    Respiratory Pattern: RR 12-20  Mental Status: Alert and Oriented  Breath Sounds: Clear  Cough: Strong, spontaneous, non-productive  Activity Level: Walking unassisted  Oxygen Requirement: Room Air - 2LNC/28% or home setting  Indication for Bronchodilator Therapy:    Bronchodilator Assessment Score:      Aerosolized bronchodilator medication orders have been revised according to the RT Bronchodilator Protocol. RT Inhaler-Nebulizer Bronchodilator Protocol:    Respiratory Therapist to perform RT Therapy Protocol Assessment then follow the protocol. No Indications - adjust the frequency to every 6 hours PRN wheezing or bronchospasm, if no treatments needed after 48 hours then discontinue using Per Protocol order mode. If indication present, adjust the RT bronchodilator orders based on the Bronchodilator Assessment Score as follows:    0-6 - enter or revise RT bronchodilator order to Albuterol Inhaler order with frequency of every 2 hours PRN for wheezing or increased work of breathing using Per Protocol order mode. If Albuterol Inhaler not tolerated or not effective, then discontinue the Albuterol Inhaler order and enter Albuterol Nebulizer order with same frequency and PRN reasons. Repeat RT Therapy Protocol Assessment as needed. 7-10 - discontinue any other Inpatient aerosolized bronchodilator medication orders and enter or revise two Albuterol Inhaler orders, one with BID frequency and one with frequency of every 2 hours PRN wheezing or increased work of breathing using Per Protocol order mode.   Repeat RT Therapy Protocol Assessment with second treatment then BID and as needed. If Albuterol Inhaler not tolerated or not effective, then discontinue the Albuterol Inhaler orders and enter two Albuterol Nebulizer orders with same frequencies and PRN reasons. 11-13 - discontinue any other Inpatient aerosolized bronchodilator medication orders and enter DuoNeb Nebulizer orders QID frequency and an Albuterol Nebulizer order every 2 hours PRN wheezing or increased work of breathing using Per Protocol order mode. Repeat RT Therapy Protocol Assessment with second treatment then QID and as needed. Greater than 13 - discontinue any other Inpatient bronchodilator aerosolized medication orders and enter DuoNeb Nebulizer order every 4 hours frequency and Albuterol Nebulizer every 2 hours PRN wheezing or increased work of breathing using Per Protocol order mode. Repeat RT Therapy Protocol Assessment with second treatment then every 4 hours and as needed. RT to enter RT Home Evaluation for COPD & MDI Assessment order using Per Protocol order mode.     Electronically signed by Flaquito Patel RCP on 8/13/2021 at 4:04 PM

## 2021-08-13 NOTE — H&P
Hospital Medicine History & Physical      Patient:  Leander Garcia  :   1970  MRN:   [id number]  Date of Service: 21    Chief Complaint   Patient presents with    Shortness of Breath     missed diaylsis today, last was three days ago       HISTORY OF PRESENT ILLNESS:    Leander Garcia is a 46 y.o. male. He has multiple chronic medical problems including ESRD on HD qT, Th, Sat. He could not get a ride to dialysis this morning. He presented in respiratory distress. He relates it felt as if severe dyspnea occurred suddenly without warning. He feels much improved now with BiPAP support. Review of Systems:  All pertinent positives and negatives are as noted in the HPI section. All other systems were reviewed and are negative.     Past Medical History:   Diagnosis Date    Ambulatory dysfunction     walker for long distances, SOB with distance    Aortic stenosis     echo     Arthritis     hands and hips    Asthma     Bilateral hilar adenopathy syndrome 6/3/2013    CAD (coronary artery disease)     Dr. Homar [facility]) 2019    EF= 43%    CH[facility]est[facility] failure[facility])     Chronic pain     COPD (chronic obstructive pulmonary disease) ([address]     pulmonology Dr. Chyna Sevilla    Depression     Diabetes mellitus ([address]     borderline    Difficult intravenous access     Emphysema of lung ([address]     ESRD (end stage renal disease) on dialysis ([address]     MWF    Fear of needles     Gastric ulcer     GERD (gastroesophageal reflux disease)     Heart valve problem     bicuspic valve    Hemodialysis patient ([address]     History of spinal fracture     work incident    Hx of blood clots     Bilateral lower extremities; stents in place    Hyperlipidemia     Hypertension     MI (myocardial infarction) ([address] 2019    has had 9 MIs. 2019 was the last    Neuromuscular disorder ([address]     due to CVA    Numbness and tingling in left arm     from fistula  Pneumonia     PONV (postoperative nausea and vomiting)     Prolonged emergence from general anesthesia     States requires more medication than most people    Sleep apnea     Uses CPAP    Stroke ([facility]     7mm thalamic cva 2017 deficts left side, left side weakness    TIA (transient ischemic attack)     Unspecified diseases of blood and blood-forming organs        Past Surgical History:   Procedure Laterality Date    AORTIC VALVE REPLACEMENT N/A 10/15/2019    TRANSCATHETER AORTIC VALVE REPLACEMENT FEMORAL APPROACH performed by Ang Haji MD at [address] [place] [address] Ave Right 7/2/2019    PERITONEAL DIALYSIS CATHETER REMOVAL performed by Divina March MD at [facility] COLONOSCOPY  2/29/2015    WN    CORONARY ANGIOPLASTY WITH STENT PLACEMENT  05/26/15    CYST REMOVAL  08/14/2013    EXCISION CYSTS, NECK X2 AND ABDOMINAL benign    DIAGNOSTIC CARDIAC CATH LAB PROCEDURE      DIALYSIS FISTULA CREATION Left 10/30/2017    LEFT BRACHIAL CEPHALIC FISTULA    DIALYSIS FISTULA CREATION Left 3/27/2019    LIGATION  AV FISTULA performed by Andry Mancilla MD at [facility]. [address], COLON, DIAGNOSTIC      OTHER SURGICAL HISTORY  02/01/2017    laparoscopic cholecystectomy with intraoperative cholangiogram    OTHER SURGICAL HISTORY  2018    PORT PLACEMENT  - vas cath    OTHER SURGICAL HISTORY Bilateral 06/26/2018    laprascopic peritoneal dialysis catheter placement    OTHER SURGICAL HISTORY Right 09/2018    peritoneal dialysis port placed on right side of abdomen    OTHER SURGICAL HISTORY  05/28/2019    PTA/Stenting R External Iliac artery    MD LAP INSERTION TUNNELED INTRAPERITONEAL CATHETER N/A 9/21/2018    LAPAROSCOPIC PERITONEAL DIALYSIS CATHETER REPLACEMENT performed by Divina March MD at [address] [place] [address]  01/06/2016    UPPER GASTROINTESTINAL ENDOSCOPY  01/29/2017    possible candida, otherwise normal appearing    VASCULAR SURGERY  aprx 2 years ago    2 stents placed, each side of groin         Prior to Admission medications    Medication Sig Start Date End Date Taking? Authorizing Provider   torsemide (DEMADEX) 100 MG tablet TAKE 1 TO 2 TABLETS BY MOUTH DAILY 8/12/21   Chuyita Swan MD   Calcium Acetate, Phos Binder, 667 MG CAPS TAKE 1 CAPSULE BY MOUTH THREE TIMES DAILY WITH MEALS 8/12/21   Chuyita Swan MD   pravastatin (PRAVACHOL) 40 MG tablet TAKE (1) TABLET BY MOUTH IN THE EVENING 8/5/21   Chuyita Swan MD   QUEtiapine (SEROQUEL) 50 MG tablet TAKE (1) TABLET BY MOUTH IN THE EVENING 8/5/21   Chuyita Swan MD   oxyCODONE-acetaminophen (PERCOCET) 7.5-325 MG per tablet Take 1 tablet by mouth every 4-6 hours as needed for Pain for up to 30 days. 8/3/21 9/2/21  Chuyita Swan MD   BASAGLAR KWIKPEN 100 UNIT/ML injection pen ADMINISTER 60 UNITS UNDER THE SKIN EVERY NIGHT 7/29/21   Chuyita Swan MD   hydrocortisone (ANUSOL-HC) 2.5 % CREA rectal cream Place rectally 2 times daily 6/9/21   Chuyita Swan MD   albuterol (PROVENTIL) (2.5 MG/3ML) 0.083% nebulizer solution INHALE 1 VIAL VIA NEBULIZER EVERY 6 HOURS AS NEEDED FOR WHEEZING 6/2/21   Chuyita Swan MD   nystatin (MYCOSTATIN) 301303 UNIT/GM cream Apply topically 2 times daily.  5/28/21   Chuyita Swan MD   famotidine (PEPCID) 20 MG tablet TAKE 1 TABLET BY MOUTH TWICE DAILY AS NEEDED FOR HEARTBURN 5/28/21   Chuyita Swan MD   hydrOXYzine (VISTARIL) 50 MG capsule TAKE 1 TO 2 CAPSULES BY MOUTH NIGHTLY 5/26/21   Chuyita Swan MD   gabapentin (NEURONTIN) 100 MG capsule TAKE 1 TO 2 CAPSULES BY MOUTH THREE TIMES A DAY 5/25/21 6/25/21  Chuyita Swan MD   LINZESS 145 MCG capsule TAKE 1 CAPSULE BY MOUTH EVERY MORNING BEFORE BREAKFAST 5/25/21   Chuyita Swan MD   hydrALAZINE (APRESOLINE) 50 MG tablet TAKE 1/2 TABLET BY MOUTH EVERY 8 HOURS 5/24/21   Chuyita Swan MD   traZODone (DESYREL) 150 MG tablet TAKE (1) TABLET BY MOUTH NIGHTLY 5/7/21   Aniya Koenig APRN - ILAN   predniSONE (DELTASONE) 20 MG tablet Take 3 tablets daily for 3 days, 2 tablets daily for 3 days and 1 tablet daily for 3 days. 5/7/21   JAY Pearce CNP   dilTIAZem (CARDIZEM CD) 180 MG extended release capsule  4/15/21   Historical Provider, MD   cyclobenzaprine (FLEXERIL) 10 MG tablet TAKE 1 TABLET BY MOUTH EVERY 8 HOURS AS NEEDED 5/3/21   Tremaine Browning MD   DULoxetine (CYMBALTA) 30 MG extended release capsule TAKE 1 CAPSULE BY MOUTH EVERY DAY 4/27/21   Tremaine Browning MD   tiZANidine (ZANAFLEX) 4 MG tablet TAKE 1 TABLET BY MOUTH THREE TIMES DAILY 4/27/21   Tremaine Browning MD   losartan (COZAAR) 50 MG tablet TAKE 1 TABLET BY MOUTH DAILY 3/25/21   Tremaine Browning MD   pantoprazole (PROTONIX) 40 MG tablet TAKE (1) TABLET BY MOUTH EACH MORNING BEFORE BREAKFAST 3/9/21   Tremaine Browning MD   simethicone (MYLICON) 80 MG chewable tablet Take 1 tablet by mouth every 6 hours as needed (cramping) 2/3/21   Manny Marino MD   glucose monitoring kit (FREESTYLE) monitoring kit 1 kit by Does not apply route daily 1/8/21   Tremaine Browning MD   blood glucose test strips (GLUCOSE METER TEST) strip 1 each by In Vitro route 5 times daily As needed. 1/8/21   Tremaine Browning MD   nitroGLYCERIN (NITROSTAT) 0.4 MG SL tablet DISSOLVE 1 TABLET UNDER THE TONGUE AS NEEDED FOR CHEST PAIN EVERY 5 MINUTES UP TO 3 TIMES.  IF NO RELIEF CALL 911. 1/7/21   Tremaine Browning MD   B Complex-C-Folic Acid (VIRT-CAPS) 1 MG CAPS TK ONE C PO  QD 11/18/20   Tremaine Browning MD   citalopram (CELEXA) 40 MG tablet TAKE (1) TABLET BY MOUTH DAILY 11/4/20   Tremaine Browning MD   insulin aspart (NOVOLOG FLEXPEN) 100 UNIT/ML injection pen Inject 20 Units into the skin 3 times daily (before meals) 10/12/20   Tremaine Browning MD   isosorbide mononitrate (IMDUR) 30 MG extended release tablet TAKE 1 TABLET BY MOUTH EVERY DAY 9/1/20   Tremaine Browning MD   ondansetron (ZOFRAN ODT) 4 MG disintegrating tablet Take 1 tablet by mouth every 8 hours as needed for Nausea 8/5/20   Jaden Gross MD   blood glucose test strips (FREESTYLE LITE) strip Daily As needed. 8/19/19   Jaden Gross MD   glucose monitoring kit (FREESTYLE) monitoring kit 1 kit by Does not apply route daily 8/19/19   Jaden Gross MD   vitamin D (ERGOCALCIFEROL) 98525 units CAPS capsule TK 1 C PO WEEKLY 6/2/19   Historical Provider, MD   flunisolide (NASALIDE) 25 MCG/ACT (0.025%) SOLN Inhale 2 sprays into the lungs every 12 hours 5/22/19   Luis Sepulveda MD   Tiotropium Bromide-Olodaterol (STIOLTO RESPIMAT) 2.5-2.5 MCG/ACT AERS Inhale 2 puffs into the lungs daily 5/21/19   Luis Sepulveda MD   Polyethylene Glycol 3350 GRAN  5/2/18   Historical Provider, MD   Glucose Blood (BLOOD GLUCOSE TEST STRIPS) STRP TEST 3-4 TIMES DAILY, AS DIRECTED 4/25/18   Kaden Ratliff MD   Blood Glucose Monitoring Suppl ADAM USE AS DIRECTED. 4/25/18   Kaden Ratliff MD   Alcohol Swabs PADS USE AS DIRECTED 4/25/18   Kaden Ratliff MD   albuterol sulfate  (90 Base) MCG/ACT inhaler Inhale 2 puffs into the lungs every 6 hours as needed for Wheezing 11/8/17   Julien Rodriguez MD   ipratropium-albuterol (DUONEB) 0.5-2.5 (3) MG/3ML SOLN nebulizer solution Inhale 3 mLs into the lungs every 6 hours as needed for Shortness of Breath 10/15/17   Renan Sanders MD   calcium carbonate (TUMS) 500 MG chewable tablet Take 1 tablet by mouth 3 times daily as needed for Heartburn. Historical Provider, MD   aspirin 81 MG chewable tablet Take 1 tablet by mouth daily. Patient taking differently: Take 81 mg by mouth daily Indications: stopped on 6/25 for surgery  5/14/13   Jasbir Rodrigues MD       Allergies:   Morphine    Social:   reports that he has been smoking cigarettes. He has a 16.50 pack-year smoking history. He has never used smokeless tobacco.   reports previous alcohol use.   Social History     Substance and Sexual Activity   Drug Use No       Family History   Problem Relation Age of Onset    Diabetes Mother    Faheem Ramírez Heart Disease Father     Kidney Disease Sister         stage 4-kidney failure    Cancer Sister     Heart Disease Sister     Obesity Sister     Cancer Sister     Heart Disease Sister     Obesity Sister     Alcohol Abuse Brother        PHYSICAL EXAM:  I performed this physical examination. Vitals:  Patient Vitals for the past 24 hrs:   BP Temp Temp src Pulse Resp SpO2   08/12/21 2254 (!) 150/99 -- -- 128 (!) 31 100 %   08/12/21 2241 -- -- -- 137 25 99 %   08/12/21 2228 (!) 211/104 -- -- 137 30 100 %   08/12/21 2222 -- -- -- -- 28 98 %   08/12/21 2221 -- -- -- -- -- 97 %   08/12/21 2220 -- -- -- -- -- 95 %   08/12/21 2219 -- -- -- 146 21 92 %   08/12/21 2218 -- -- -- -- -- (!) 82 %   08/12/21 2217 -- -- -- -- -- (!) 64 %   08/12/21 2216 -- 98.9 °F (37.2 °C) Axillary 147 (!) 45 (!) 65 %     No intake or output data in the 24 hours ending 08/12/21 2323    Vent Settings: BiPAP 20 / 10 85%    GEN:  Appearance:  Obese male. Appears comfortable on BiPAP . Level of Consciousness:  alert . Orientation:  full    HEENT: Sclera anicteric.  no conjunctival chemosis. moist mucus membranes. no specific or diagnostic oral lesions. NECK:  no signs of meningismus. Jugular veins not discernible due to short thick neck. Carotid pulses  2+.  no cervical lymphadenopathy. no thyromegaly. CV:  regular rhythm. normal S1 & S2.    no murmur. no rub.  no gallop. CHEST: Left upper chest HD catheter in situ. PULM:  Chest excursion is symmetric. Breath sounds are generally diminished throughout. .    Adventitious sounds: Inspiratory crackles throughout the posterior fields    AB:  Abdominal shape is obese. Bowel sounds are active. Generally soft to palpation. no tenderness is present. no involuntary guarding. no rebound guarding. EXTR:  Skin is warm. Capillary refill brisk. no specific or pathognomic rash. no clubbing.     Trace to 1+ pitting edema to just above the ankles  no active wound or ulcer. LABS:  Lab Results   Component Value Date    WBC 20.5 (H) 08/12/2021    HGB 11.9 (L) 08/12/2021    HCT 37.2 (L) 08/12/2021    MCV 94.9 08/12/2021     08/12/2021     Lab Results   Component Value Date    CREATININE 6.4 (HH) 08/12/2021    BUN 79 (H) 08/12/2021     (L) 08/12/2021    K 4.2 08/12/2021    CL 93 (L) 08/12/2021    CO2 21 08/12/2021     Lab Results   Component Value Date    ALT 16 08/12/2021    AST 12 (L) 08/12/2021    ALKPHOS 101 08/12/2021    BILITOT 0.3 08/12/2021     Lab Results   Component Value Date    CKTOTAL 167 06/05/2020    TROPONINI 0.18 (H) 08/12/2021     No results for input(s): PHART, GFH8SWJ, PO2ART in the last 72 hours. IMAGING:  XR CHEST PORTABLE    Result Date: 7/27/2021  EXAMINATION: ONE XRAY VIEW OF THE CHEST 7/27/2021 1:54 am COMPARISON: 06/26/2021 HISTORY: ORDERING SYSTEM PROVIDED HISTORY: sob, hypoxia TECHNOLOGIST PROVIDED HISTORY: Reason for exam:->sob, hypoxia Reason for Exam: Shortness of Breath (Patient arrived via EMs after waking up OSB. Patient missed Dialysis this am. Patient was 74 on room air upon EMS arrival. Patient on CPAP upon arrival. ) FINDINGS: Left subclavian line with tip in the SVC. TAVR stent is present. Mild central pulmonary vascular congestion with mild interstitial pulmonary edema. Cardiomediastinal silhouette and bony thorax are unchanged. No evidence of pleural effusion or pneumothorax. No focal consolidation. Findings suggesting mild central pulmonary vascular congestion/interstitial pulmonary edema. I directly reviewed all recent imaging studies as well as pertinent prior studies. Radiology reports may or may not be available at the time of my review. EKG:  New and pertinent prior tracings were directly reviewed. My interpretation is as follows:  Sinus tachycardia. One PVC. Poor precordial R-wave progression.     Active Hospital Problems    Diagnosis Date Noted    CAD S/P percutaneous coronary angioplasty [I25.10, Z98.61] 05/14/2013     Priority: High    Chronic dCHF (grade 2 LVDD) [I50.9] 05/14/2013     Priority: High    S/P TAVR (transcatheter aortic valve replacement) [Z95.2] 10/19/2019     Priority: Medium    Essential hypertension [I10] 11/14/2013     Priority: Medium    Chronic obstructive pulmonary disease ([facility] [J44.9] 05/14/2013     Priority: Medium    Acute hypoxemic respiratory failure ([facility] [J96.01] 08/12/2021    Type 2 diabetes mellitus with hyperglycemia ([facility] [E11.65] 06/27/2021    Pulmonary edema [J81.1] 11/09/2018    Fluid overload [E87.70] 11/09/2018    ESRD (end stage renal disease) on dialysis ([facility] [N18.6, Z99.2] 11/22/2017    ZAINAB on CPAP [G47.33, Z99.89] 02/11/2016    Obesity (BMI 30-39. 9) [E66.9]     Nonischemic cardiomyopathy ([facility] [I42.8] 05/22/2013       ASSESSMENT & PLAN  Acute respiratory failure, Severe Asthma-COPD, ZAINAB on CPAP  -  Respiratory failure due to volume overload in turn d/t ESRD and missing dialysis. -  Titrate level of respiratory support according to patient's needs. Target goal SpO2 = 90-94%. Currently patient is requiring BiPAP  support 20 / 10 with 85% FiO2 and does seem to be responding well. -  Continue home [address] Short acting bronchodilators made available prn.  -  Home CPAP setting is 8 - 12 cm H2O. ESRD, HTN, anemia of CKD  -  Nephrology assistance requested. -  Patient is responding adequately to BiPAP support. There is no ICU bed available to accomodoate urgent dialysis tonight. It seems safe to hold off until tomorrow morning.  -  Continue NTG infusion titrated to keep SBP = 160 - 180 mmHg. Taper as able, likely after HD.  -  Continue home diltiazem, hydralazine, losartan, torsemide, phosLo    CAD s/p PCI,  HFpEF, AS s/p TAVR  -  Chronically elevated troponin. Will trend overnight.  -  Stable. Continue aspirin, imdur and statin. DM2 w/ hyperglycemia  -  Hold all oral antidiabetic agents. Start s.c.  Insulin regimen based on home regimen. DVT prophylaxis:        SCDs, s.c. UFH  Code Status:               Full  Disposition:                 Inpatient. Anticipate d/c to home in 1-2 days.     Tammi Hooks MD MD

## 2021-08-14 LAB
ANION GAP SERPL CALCULATED.3IONS-SCNC: 14 MMOL/L (ref 3–16)
BASOPHILS ABSOLUTE: 0.1 K/UL (ref 0–0.2)
BASOPHILS RELATIVE PERCENT: 0.9 %
BUN BLDV-MCNC: 50 MG/DL (ref 7–20)
CALCIUM SERPL-MCNC: 8.2 MG/DL (ref 8.3–10.6)
CHLORIDE BLD-SCNC: 95 MMOL/L (ref 99–110)
CO2: 23 MMOL/L (ref 21–32)
CREAT SERPL-MCNC: 5.1 MG/DL (ref 0.9–1.3)
EOSINOPHILS ABSOLUTE: 0.5 K/UL (ref 0–0.6)
EOSINOPHILS RELATIVE PERCENT: 4.8 %
GFR AFRICAN AMERICAN: 14
GFR NON-AFRICAN AMERICAN: 12
GLUCOSE BLD-MCNC: 167 MG/DL (ref 70–99)
GLUCOSE BLD-MCNC: 170 MG/DL (ref 70–99)
GLUCOSE BLD-MCNC: 186 MG/DL (ref 70–99)
GLUCOSE BLD-MCNC: 196 MG/DL (ref 70–99)
GLUCOSE BLD-MCNC: 277 MG/DL (ref 70–99)
HCT VFR BLD CALC: 29.2 % (ref 40.5–52.5)
HEMOGLOBIN: 9.9 G/DL (ref 13.5–17.5)
LYMPHOCYTES ABSOLUTE: 1.1 K/UL (ref 1–5.1)
LYMPHOCYTES RELATIVE PERCENT: 11.4 %
MAGNESIUM: 1.8 MG/DL (ref 1.8–2.4)
MCH RBC QN AUTO: 31.2 PG (ref 26–34)
MCHC RBC AUTO-ENTMCNC: 33.8 G/DL (ref 31–36)
MCV RBC AUTO: 92.3 FL (ref 80–100)
MONOCYTES ABSOLUTE: 0.6 K/UL (ref 0–1.3)
MONOCYTES RELATIVE PERCENT: 6.5 %
NEUTROPHILS ABSOLUTE: 7.4 K/UL (ref 1.7–7.7)
NEUTROPHILS RELATIVE PERCENT: 76.4 %
PDW BLD-RTO: 16.1 % (ref 12.4–15.4)
PERFORMED ON: ABNORMAL
PHOSPHORUS: 5.9 MG/DL (ref 2.5–4.9)
PLATELET # BLD: 164 K/UL (ref 135–450)
PMV BLD AUTO: 9.3 FL (ref 5–10.5)
POTASSIUM SERPL-SCNC: 3.8 MMOL/L (ref 3.5–5.1)
RBC # BLD: 3.16 M/UL (ref 4.2–5.9)
SODIUM BLD-SCNC: 132 MMOL/L (ref 136–145)
WBC # BLD: 9.7 K/UL (ref 4–11)

## 2021-08-14 PROCEDURE — 2060000000 HC ICU INTERMEDIATE R&B

## 2021-08-14 PROCEDURE — 94660 CPAP INITIATION&MGMT: CPT

## 2021-08-14 PROCEDURE — 6370000000 HC RX 637 (ALT 250 FOR IP): Performed by: INTERNAL MEDICINE

## 2021-08-14 PROCEDURE — 85025 COMPLETE CBC W/AUTO DIFF WBC: CPT

## 2021-08-14 PROCEDURE — 83735 ASSAY OF MAGNESIUM: CPT

## 2021-08-14 PROCEDURE — 6360000002 HC RX W HCPCS: Performed by: INTERNAL MEDICINE

## 2021-08-14 PROCEDURE — 94761 N-INVAS EAR/PLS OXIMETRY MLT: CPT

## 2021-08-14 PROCEDURE — 2500000003 HC RX 250 WO HCPCS: Performed by: INTERNAL MEDICINE

## 2021-08-14 PROCEDURE — 80048 BASIC METABOLIC PNL TOTAL CA: CPT

## 2021-08-14 PROCEDURE — 2700000000 HC OXYGEN THERAPY PER DAY

## 2021-08-14 PROCEDURE — 84100 ASSAY OF PHOSPHORUS: CPT

## 2021-08-14 PROCEDURE — 2580000003 HC RX 258: Performed by: INTERNAL MEDICINE

## 2021-08-14 PROCEDURE — 36415 COLL VENOUS BLD VENIPUNCTURE: CPT

## 2021-08-14 PROCEDURE — 94640 AIRWAY INHALATION TREATMENT: CPT

## 2021-08-14 RX ADMIN — HYDRALAZINE HYDROCHLORIDE 25 MG: 25 TABLET, FILM COATED ORAL at 20:58

## 2021-08-14 RX ADMIN — INSULIN LISPRO 9 UNITS: 100 INJECTION, SOLUTION INTRAVENOUS; SUBCUTANEOUS at 13:02

## 2021-08-14 RX ADMIN — TORSEMIDE 100 MG: 100 TABLET ORAL at 09:22

## 2021-08-14 RX ADMIN — INSULIN GLARGINE 60 UNITS: 100 INJECTION, SOLUTION SUBCUTANEOUS at 20:58

## 2021-08-14 RX ADMIN — DILTIAZEM HYDROCHLORIDE 180 MG: 180 CAPSULE, COATED, EXTENDED RELEASE ORAL at 09:23

## 2021-08-14 RX ADMIN — INSULIN LISPRO 3 UNITS: 100 INJECTION, SOLUTION INTRAVENOUS; SUBCUTANEOUS at 09:28

## 2021-08-14 RX ADMIN — CALCIUM ACETATE 667 MG: 667 CAPSULE ORAL at 09:23

## 2021-08-14 RX ADMIN — ONDANSETRON 4 MG: 4 TABLET, ORALLY DISINTEGRATING ORAL at 20:57

## 2021-08-14 RX ADMIN — HYDRALAZINE HYDROCHLORIDE 25 MG: 25 TABLET, FILM COATED ORAL at 12:56

## 2021-08-14 RX ADMIN — HEPARIN SODIUM 5000 UNITS: 5000 INJECTION INTRAVENOUS; SUBCUTANEOUS at 13:02

## 2021-08-14 RX ADMIN — PRAVASTATIN SODIUM 40 MG: 40 TABLET ORAL at 20:58

## 2021-08-14 RX ADMIN — CEFEPIME 1000 MG: 1 INJECTION, POWDER, FOR SOLUTION INTRAMUSCULAR; INTRAVENOUS at 06:08

## 2021-08-14 RX ADMIN — OXYCODONE AND ACETAMINOPHEN 1 TABLET: 7.5; 325 TABLET ORAL at 17:24

## 2021-08-14 RX ADMIN — ASPIRIN 81 MG: 81 TABLET, CHEWABLE ORAL at 09:23

## 2021-08-14 RX ADMIN — INSULIN LISPRO 15 UNITS: 100 INJECTION, SOLUTION INTRAVENOUS; SUBCUTANEOUS at 13:01

## 2021-08-14 RX ADMIN — CITALOPRAM HYDROBROMIDE 40 MG: 20 TABLET ORAL at 09:23

## 2021-08-14 RX ADMIN — HYDRALAZINE HYDROCHLORIDE 25 MG: 25 TABLET, FILM COATED ORAL at 09:23

## 2021-08-14 RX ADMIN — TIOTROPIUM BROMIDE AND OLODATEROL 2 PUFF: 3.124; 2.736 SPRAY, METERED RESPIRATORY (INHALATION) at 08:45

## 2021-08-14 RX ADMIN — TRAZODONE HYDROCHLORIDE 50 MG: 50 TABLET ORAL at 20:58

## 2021-08-14 RX ADMIN — HEPARIN SODIUM 5000 UNITS: 5000 INJECTION INTRAVENOUS; SUBCUTANEOUS at 20:58

## 2021-08-14 RX ADMIN — CALCIUM ACETATE 667 MG: 667 CAPSULE ORAL at 12:56

## 2021-08-14 RX ADMIN — QUETIAPINE FUMARATE 50 MG: 25 TABLET ORAL at 20:58

## 2021-08-14 RX ADMIN — INSULIN LISPRO 15 UNITS: 100 INJECTION, SOLUTION INTRAVENOUS; SUBCUTANEOUS at 17:25

## 2021-08-14 RX ADMIN — TIZANIDINE 4 MG: 4 TABLET ORAL at 12:57

## 2021-08-14 RX ADMIN — TIZANIDINE 4 MG: 4 TABLET ORAL at 09:23

## 2021-08-14 RX ADMIN — ISOSORBIDE MONONITRATE 30 MG: 30 TABLET, EXTENDED RELEASE ORAL at 09:23

## 2021-08-14 RX ADMIN — TIZANIDINE 4 MG: 4 TABLET ORAL at 20:58

## 2021-08-14 RX ADMIN — INSULIN LISPRO 3 UNITS: 100 INJECTION, SOLUTION INTRAVENOUS; SUBCUTANEOUS at 17:25

## 2021-08-14 RX ADMIN — LOSARTAN POTASSIUM 50 MG: 25 TABLET, FILM COATED ORAL at 09:22

## 2021-08-14 RX ADMIN — INSULIN LISPRO 15 UNITS: 100 INJECTION, SOLUTION INTRAVENOUS; SUBCUTANEOUS at 09:27

## 2021-08-14 RX ADMIN — INSULIN LISPRO 2 UNITS: 100 INJECTION, SOLUTION INTRAVENOUS; SUBCUTANEOUS at 20:59

## 2021-08-14 RX ADMIN — CALCIUM ACETATE 667 MG: 667 CAPSULE ORAL at 17:24

## 2021-08-14 RX ADMIN — OXYCODONE AND ACETAMINOPHEN 1 TABLET: 7.5; 325 TABLET ORAL at 09:23

## 2021-08-14 RX ADMIN — PANTOPRAZOLE SODIUM 40 MG: 40 TABLET, DELAYED RELEASE ORAL at 09:23

## 2021-08-14 RX ADMIN — HEPARIN SODIUM 5000 UNITS: 5000 INJECTION INTRAVENOUS; SUBCUTANEOUS at 06:04

## 2021-08-14 ASSESSMENT — PAIN SCALES - GENERAL
PAINLEVEL_OUTOF10: 5
PAINLEVEL_OUTOF10: 4
PAINLEVEL_OUTOF10: 4
PAINLEVEL_OUTOF10: 8
PAINLEVEL_OUTOF10: 8
PAINLEVEL_OUTOF10: 4
PAINLEVEL_OUTOF10: 8
PAINLEVEL_OUTOF10: 4

## 2021-08-14 ASSESSMENT — PAIN SCALES - WONG BAKER
WONGBAKER_NUMERICALRESPONSE: 4

## 2021-08-14 ASSESSMENT — PAIN DESCRIPTION - ONSET
ONSET: ON-GOING
ONSET: ON-GOING

## 2021-08-14 ASSESSMENT — PAIN DESCRIPTION - LOCATION: LOCATION: BACK

## 2021-08-14 ASSESSMENT — PAIN DESCRIPTION - PAIN TYPE: TYPE: CHRONIC PAIN

## 2021-08-14 ASSESSMENT — PAIN DESCRIPTION - PROGRESSION: CLINICAL_PROGRESSION: NOT CHANGED

## 2021-08-14 NOTE — PROGRESS NOTES
08/14/21 0058   NIV Type   $NIV $Daily Charge   NIV Started/Stopped On   Equipment Type v60   Mode Bilevel   Mask Type Full face mask   Mask Size Large   Settings/Measurements   IPAP 20 cmH20   CPAP/EPAP 10 cmH2O   Resp 20   FiO2  85 %   Vt Exhaled 741 mL   Minute Volume 14.6 Liters   Mask Leak (lpm) 9 lpm   Comfort Level Good   Using Accessory Muscles No   SpO2 100   Breath Sounds   Right Upper Lobe Diminished   Right Middle Lobe Diminished   Right Lower Lobe Diminished   Left Upper Lobe Diminished   Left Lower Lobe Diminished   Alarm Settings   Alarms On Y   Press Low Alarm 6 cmH2O   High Pressure Alarm 30 cmH2O   Delay Alarm 20 sec(s)   Resp Rate Low Alarm 6   High Respiratory Rate 40 br/min

## 2021-08-14 NOTE — PROGRESS NOTES
felt better on BIPAP; also needed ntg gtt. Both have been weaned off at HD  HD performed with 2.5l UF on 8/13 and had to be shortened to 2h 50m d/t severe cramps    Physical exam:   Constitutional:  VITALS:  /67   Pulse 86   Temp 97.3 °F (36.3 °C)   Resp 20   Ht 5' 9\" (1.753 m)   Wt 258 lb 6.1 oz (117.2 kg)   SpO2 97%   BMI 38.16 kg/m²   Gen: alert, awake, nad  HEENT: pupils reactive  Neck: no bruits or jvd noted  Cardiovascular:  S1, S2 without m/r/g; 1+ lower extremity edema  Respiratory: CTA B without w/r/r; respiratory effort normal  Abdomen:  +bs, soft, nt, nd, no hepatosplenomegaly  Neuro/Psy: AAoriented times 3 ; moves all 4 ext    Data/  Recent Labs     08/12/21 2220 08/13/21  1000 08/14/21  0454   WBC 20.5* 10.9 9.7   HGB 11.9* 9.8* 9.9*   HCT 37.2* 29.2* 29.2*   MCV 94.9 91.8 92.3    172 164     Recent Labs     08/12/21 2220 08/12/21 2220 08/13/21 0220 08/13/21  1000 08/14/21  0455   *  --   --  133* 132*   K 4.2  --   --  3.3* 3.8   CL 93*  --   --  96* 95*   CO2 21  --   --  20* 23   GLUCOSE 666*   < > 650* 150* 170*   PHOS  --   --   --   --  5.9*   MG  --   --   --  2.00 1.80   BUN 79*  --   --  89* 50*   CREATININE 6.4*  --   --  7.5* 5.1*   LABGLOM 9*  --   --  8* 12*   GFRAA 11*  --   --  9* 14*    < > = values in this interval not displayed. Assessment  -ESRD on HD MWF   -Acute resp failure on BIPAP from volume overload  -DM, uncontrolled  - Hyponatremia from vol overload and hyperglycemia  -hypokalemia- no need to replace k  -Anemia  -CKD/MBD  -HTN    Plan  -HD per schedule   S/p 2.5l net UF on 8/13   Current OP TW of 119 kg --> to 118.5 kg   -renal dose meds  -dispo per IM, ok to dc from renal std pt    Thank you for the consultation. Please do not hesitate to call with questions.     Ishan Caicedo MD  [facility]  Office:   Fax:    [phone]

## 2021-08-14 NOTE — PROGRESS NOTES
Hospitalist Progress Note      PCP: Isamar Montiel MD    Date of Admission: 8/12/2021    Chief Complaint: Shortness of breath    Hospital Course: Presented with shortness of breath after missed dialysis. Back on room air now. Noted leukocytosis, started cefepime. Patient noticed that BiPAP was much more helpful for him than CPAP and asked to talk to a pulmonologist.    Subjective: No chest pain, improved shortness of breath, no nausea or vomiting.       Medications:  Reviewed    Infusion Medications    nitroGLYCERIN Stopped (08/13/21 3464)    sodium chloride      dextrose       Scheduled Medications    aspirin  81 mg Oral Daily    Calcium Acetate (Phos Binder)  667 mg Oral TID WC    citalopram  40 mg Oral Daily    dilTIAZem  180 mg Oral Daily    hydrALAZINE  25 mg Oral TID    isosorbide mononitrate  30 mg Oral Daily    linaclotide  145 mcg Oral QAM AC    losartan  50 mg Oral Daily    pantoprazole  40 mg Oral QAM AC    pravastatin  40 mg Oral Nightly    QUEtiapine  50 mg Oral Nightly    tiotropium-olodaterol  2 puff Inhalation Daily    tiZANidine  4 mg Oral TID    torsemide  100 mg Oral Daily    traZODone  50 mg Oral Nightly    heparin (porcine)  5,000 Units Subcutaneous 3 times per day    insulin lispro  15 Units Subcutaneous Once    cefepime  1,000 mg Intravenous Q24H    iron sucrose  50 mg Intravenous Q MWF    epoetin siddharth-epbx  4,000 Units Subcutaneous Q MWF    insulin glargine  60 Units Subcutaneous Nightly    insulin lispro  15 Units Subcutaneous TID WC    insulin lispro  0-18 Units Subcutaneous TID WC    insulin lispro  0-9 Units Subcutaneous Nightly     PRN Meds: calcium carbonate, cyclobenzaprine, hydrOXYzine, sodium chloride flush, sodium chloride, ondansetron **OR** ondansetron, acetaminophen **OR** acetaminophen, melatonin, albuterol, heparin (porcine), glucose, dextrose, glucagon (rDNA), dextrose, oxyCODONE-acetaminophen      Intake/Output Summary (Last 24 hours) at 8/14/2021 1314  Last data filed at 8/14/2021 0420  Gross per 24 hour   Intake 1200 ml   Output 450 ml   Net 750 ml       Physical Exam Performed:    /67   Pulse 86   Temp 97.3 °F (36.3 °C)   Resp 20   Ht 5' 9\" (1.753 m)   Wt 258 lb 6.1 oz (117.2 kg)   SpO2 97%   BMI 38.16 kg/m²     General appearance: No apparent distress, appears stated age and cooperative. HEENT: Pupils equal, round, and reactive to light. Conjunctivae/corneas clear. Neck: Supple, with full range of motion. No jugular venous distention. Trachea midline. Respiratory:  Normal respiratory effort. Few rhonchi otherwise clear. Cardiovascular: Regular rate and rhythm with normal S1/S2 without murmurs, rubs or gallops. Abdomen: Soft, obese, non-tender, non-distended with normal bowel sounds. Musculoskeletal: No clubbing, cyanosis or edema bilaterally. Full range of motion without deformity. Skin: Skin color, texture, turgor normal.  No rashes or lesions. Neurologic:  Neurovascularly intact without any focal sensory/motor deficits. Cranial nerves: II-XII intact, grossly non-focal.  Psychiatric: Alert and oriented, thought content appropriate, normal insight  Capillary Refill: Brisk,3 seconds, normal   Peripheral Pulses: +2 palpable, equal bilaterally       Labs:   Recent Labs     08/12/21 2220 08/13/21  1000 08/14/21  0454   WBC 20.5* 10.9 9.7   HGB 11.9* 9.8* 9.9*   HCT 37.2* 29.2* 29.2*    172 164     Recent Labs     08/12/21 2220 08/13/21  1000 08/14/21  0455   * 133* 132*   K 4.2 3.3* 3.8   CL 93* 96* 95*   CO2 21 20* 23   BUN 79* 89* 50*   CREATININE 6.4* 7.5* 5.1*   CALCIUM 8.8 8.9 8.2*   PHOS  --   --  5.9*     Recent Labs     08/12/21 2220   AST 12*   ALT 16   BILITOT 0.3   ALKPHOS 101     No results for input(s): INR in the last 72 hours.   Recent Labs     08/12/21 2220 08/13/21  0130 08/13/21  0707   TROPONINI 0.18* 0.22* 0.23*       Urinalysis:      Lab Results   Component Value Date    NITRU Negative 06/26/2021    WBCUA 6-9 06/26/2021    BACTERIA Rare 06/26/2021    RBCUA 11-20 06/26/2021    BLOODU SMALL 06/26/2021    SPECGRAV 1.015 06/26/2021    GLUCOSEU >=1000 06/26/2021       Radiology:  XR CHEST PORTABLE   Final Result   Questionable right upper and basilar opacities. Asymmetric pulmonary edema   and pneumonia are in the differential.                 Assessment/Plan:    Active Hospital Problems    Diagnosis     S/P TAVR (transcatheter aortic valve replacement) [Z95.2]      Priority: Medium    Essential hypertension [I10]      Priority: Medium    Chronic obstructive pulmonary disease (HCC) [J44.9]      Priority: Medium    CHF (congestive heart failure) ([facility]) [I50.9]     Acute hypoxemic respiratory failure ([facility]) [J96.01]     Type 2 diabetes mellitus with hyperglycemia ([facility]) [E11.65]     Pulmonary edema [J81.1]     ESRD (end stage renal disease) on dialysis ([facility] [address] [N18.6, Z99.2]     ZAINAB on CPAP [G47.33, Z99.89]     Obesity (BMI 30-39. 9) [E66.9]     CAD S/P percutaneous coronary angioplasty [I25.10, Z98.61]      PLAN    Acute hypoxemic respiratory failure  Patient presented with hypoxemia and required BiPAP. Currently back on room air after hemodialysis. Hypoxemic respiratory failure is multifactorial with COPD, restrictive disease caused by obesity, obstructive sleep apnea and fluid overload. Currently back on room air, but becomes hypoxemic with sleep. Obstructive sleep apnea  States he is home CPAP is not effective. Did much better on BiPAP. Pulmonology consult requested by the patient for home BiPAP evaluation and prescription. End-stage renal disease  Missed outpatient dialysis and got short of breath with hypoxemia. Better now. Nephrology following. Nothing further needed except for compliance with hemodialysis. Diabetes mellitus type 2 with hyperglycemia  Continue basal bolus insulin. Change diet to carb controlled. Leukocytosis  Resolved.   Received empiric cefepime without evidence to support infection. Continue cefepime for now. Chest x-ray nondiagnostic. If pulmonology does not recommend antibiotics, cefepime will be stopped. Elevated troponin  Consistent with increased demand due to fluid overload at the time of admission. Does not have chest pain. Nothing further. Already on appropriate medical management with aspirin and statin. Discussed with the patient. Questions answered    DVT Prophylaxis: Subcutaneous heparin  Diet: ADULT DIET; Regular  Code Status: Full Code    PT/OT Eval Status: Not needed    Dispo -inpatient stay probably 1 or 2 more days.     Satnam Ochoa MD

## 2021-08-15 VITALS
WEIGHT: 271.6 LBS | TEMPERATURE: 97.6 F | SYSTOLIC BLOOD PRESSURE: 117 MMHG | RESPIRATION RATE: 18 BRPM | OXYGEN SATURATION: 93 % | HEART RATE: 81 BPM | DIASTOLIC BLOOD PRESSURE: 66 MMHG | BODY MASS INDEX: 40.23 KG/M2 | HEIGHT: 69 IN

## 2021-08-15 LAB
ANION GAP SERPL CALCULATED.3IONS-SCNC: 15 MMOL/L (ref 3–16)
BASOPHILS ABSOLUTE: 0.1 K/UL (ref 0–0.2)
BASOPHILS RELATIVE PERCENT: 0.9 %
BUN BLDV-MCNC: 59 MG/DL (ref 7–20)
CALCIUM SERPL-MCNC: 7.9 MG/DL (ref 8.3–10.6)
CHLORIDE BLD-SCNC: 95 MMOL/L (ref 99–110)
CO2: 22 MMOL/L (ref 21–32)
CREAT SERPL-MCNC: 6.2 MG/DL (ref 0.9–1.3)
EOSINOPHILS ABSOLUTE: 0.5 K/UL (ref 0–0.6)
EOSINOPHILS RELATIVE PERCENT: 5.5 %
GFR AFRICAN AMERICAN: 12
GFR NON-AFRICAN AMERICAN: 10
GLUCOSE BLD-MCNC: 126 MG/DL (ref 70–99)
GLUCOSE BLD-MCNC: 83 MG/DL (ref 70–99)
GLUCOSE BLD-MCNC: 94 MG/DL (ref 70–99)
HCT VFR BLD CALC: 27.7 % (ref 40.5–52.5)
HEMOGLOBIN: 9.3 G/DL (ref 13.5–17.5)
LYMPHOCYTES ABSOLUTE: 1 K/UL (ref 1–5.1)
LYMPHOCYTES RELATIVE PERCENT: 12.1 %
MAGNESIUM: 1.8 MG/DL (ref 1.8–2.4)
MCH RBC QN AUTO: 31.2 PG (ref 26–34)
MCHC RBC AUTO-ENTMCNC: 33.8 G/DL (ref 31–36)
MCV RBC AUTO: 92.4 FL (ref 80–100)
MONOCYTES ABSOLUTE: 0.6 K/UL (ref 0–1.3)
MONOCYTES RELATIVE PERCENT: 7.5 %
NEUTROPHILS ABSOLUTE: 6.1 K/UL (ref 1.7–7.7)
NEUTROPHILS RELATIVE PERCENT: 74 %
PDW BLD-RTO: 16.2 % (ref 12.4–15.4)
PERFORMED ON: ABNORMAL
PERFORMED ON: NORMAL
PHOSPHORUS: 6.6 MG/DL (ref 2.5–4.9)
PLATELET # BLD: 164 K/UL (ref 135–450)
PMV BLD AUTO: 9.3 FL (ref 5–10.5)
POTASSIUM SERPL-SCNC: 4.1 MMOL/L (ref 3.5–5.1)
RBC # BLD: 2.99 M/UL (ref 4.2–5.9)
SODIUM BLD-SCNC: 132 MMOL/L (ref 136–145)
WBC # BLD: 8.3 K/UL (ref 4–11)

## 2021-08-15 PROCEDURE — 83735 ASSAY OF MAGNESIUM: CPT

## 2021-08-15 PROCEDURE — 36415 COLL VENOUS BLD VENIPUNCTURE: CPT

## 2021-08-15 PROCEDURE — 6370000000 HC RX 637 (ALT 250 FOR IP): Performed by: INTERNAL MEDICINE

## 2021-08-15 PROCEDURE — 2500000003 HC RX 250 WO HCPCS: Performed by: INTERNAL MEDICINE

## 2021-08-15 PROCEDURE — 80048 BASIC METABOLIC PNL TOTAL CA: CPT

## 2021-08-15 PROCEDURE — 94640 AIRWAY INHALATION TREATMENT: CPT

## 2021-08-15 PROCEDURE — 84100 ASSAY OF PHOSPHORUS: CPT

## 2021-08-15 PROCEDURE — 99222 1ST HOSP IP/OBS MODERATE 55: CPT | Performed by: INTERNAL MEDICINE

## 2021-08-15 PROCEDURE — 85025 COMPLETE CBC W/AUTO DIFF WBC: CPT

## 2021-08-15 PROCEDURE — 6360000002 HC RX W HCPCS: Performed by: INTERNAL MEDICINE

## 2021-08-15 RX ORDER — CEFUROXIME AXETIL 250 MG/1
250 TABLET ORAL DAILY
Qty: 5 TABLET | Refills: 0 | Status: SHIPPED | OUTPATIENT
Start: 2021-08-15 | End: 2021-08-20

## 2021-08-15 RX ORDER — CEFUROXIME AXETIL 250 MG/1
250 TABLET ORAL ONCE
Status: COMPLETED | OUTPATIENT
Start: 2021-08-15 | End: 2021-08-15

## 2021-08-15 RX ADMIN — TIZANIDINE 4 MG: 4 TABLET ORAL at 13:54

## 2021-08-15 RX ADMIN — Medication 3 MG: at 00:54

## 2021-08-15 RX ADMIN — OXYCODONE AND ACETAMINOPHEN 1 TABLET: 7.5; 325 TABLET ORAL at 08:36

## 2021-08-15 RX ADMIN — TIOTROPIUM BROMIDE AND OLODATEROL 2 PUFF: 3.124; 2.736 SPRAY, METERED RESPIRATORY (INHALATION) at 08:37

## 2021-08-15 RX ADMIN — CEFUROXIME AXETIL 250 MG: 250 TABLET ORAL at 12:33

## 2021-08-15 RX ADMIN — PANTOPRAZOLE SODIUM 40 MG: 40 TABLET, DELAYED RELEASE ORAL at 08:35

## 2021-08-15 RX ADMIN — DILTIAZEM HYDROCHLORIDE 180 MG: 180 CAPSULE, COATED, EXTENDED RELEASE ORAL at 08:35

## 2021-08-15 RX ADMIN — ISOSORBIDE MONONITRATE 30 MG: 30 TABLET, EXTENDED RELEASE ORAL at 08:35

## 2021-08-15 RX ADMIN — CALCIUM ACETATE 667 MG: 667 CAPSULE ORAL at 08:35

## 2021-08-15 RX ADMIN — OXYCODONE AND ACETAMINOPHEN 1 TABLET: 7.5; 325 TABLET ORAL at 00:54

## 2021-08-15 RX ADMIN — TORSEMIDE 100 MG: 100 TABLET ORAL at 08:35

## 2021-08-15 RX ADMIN — TIZANIDINE 4 MG: 4 TABLET ORAL at 08:36

## 2021-08-15 RX ADMIN — HEPARIN SODIUM 5000 UNITS: 5000 INJECTION INTRAVENOUS; SUBCUTANEOUS at 06:50

## 2021-08-15 RX ADMIN — ASPIRIN 81 MG: 81 TABLET, CHEWABLE ORAL at 08:35

## 2021-08-15 RX ADMIN — INSULIN LISPRO 15 UNITS: 100 INJECTION, SOLUTION INTRAVENOUS; SUBCUTANEOUS at 08:38

## 2021-08-15 RX ADMIN — CALCIUM ACETATE 667 MG: 667 CAPSULE ORAL at 12:33

## 2021-08-15 RX ADMIN — HYDRALAZINE HYDROCHLORIDE 25 MG: 25 TABLET, FILM COATED ORAL at 08:35

## 2021-08-15 RX ADMIN — CITALOPRAM HYDROBROMIDE 40 MG: 20 TABLET ORAL at 08:36

## 2021-08-15 RX ADMIN — HYDRALAZINE HYDROCHLORIDE 25 MG: 25 TABLET, FILM COATED ORAL at 13:54

## 2021-08-15 RX ADMIN — LOSARTAN POTASSIUM 50 MG: 25 TABLET, FILM COATED ORAL at 08:35

## 2021-08-15 ASSESSMENT — ENCOUNTER SYMPTOMS
GASTROINTESTINAL NEGATIVE: 1
EYES NEGATIVE: 1
ALLERGIC/IMMUNOLOGIC NEGATIVE: 1
RESPIRATORY NEGATIVE: 1

## 2021-08-15 ASSESSMENT — PAIN SCALES - GENERAL
PAINLEVEL_OUTOF10: 8
PAINLEVEL_OUTOF10: 8

## 2021-08-15 NOTE — PROGRESS NOTES
08/14/21 2334   NIV Type   NIV Started/Stopped On   Equipment Type  v60   Mode Bilevel   Mask Type Full face mask   Mask Size Large   Settings/Measurements   IPAP 20 cmH20   CPAP/EPAP 10 cmH2O   Rate Ordered 8   Resp 14   FiO2  30 %   Vt Exhaled 820 mL   Minute Volume 11.7 Liters   Mask Leak (lpm) 21 lpm   Comfort Level Good   Using Accessory Muscles No   SpO2 100   Breath Sounds   Right Upper Lobe Clear   Right Middle Lobe Diminished   Right Lower Lobe Diminished   Left Upper Lobe Clear   Left Lower Lobe Diminished   Alarm Settings   Alarms On Y   Press Low Alarm 6 cmH2O   High Pressure Alarm 30 cmH2O   Delay Alarm 20 sec(s)   Resp Rate Low Alarm 6   High Respiratory Rate 40 br/min

## 2021-08-15 NOTE — CONSULTS
Consult Note            Date:8/15/2021        Patient Name:Igor De Oliveira     YOB: 1970     Age:51 y.o. Inpatient consult to Pulmonology  Consult performed by: Misty Forbes MD  Consult ordered by: Jad Freire MD  Reason for consult: CPAP therapy, wanted to go to BiPAP therapy          Chief Complaint     Chief Complaint   Patient presents with    Shortness of Breath     missed diaylsis today, last was three days ago      CPAP therapy, wanted to go to BiPAP therapy    History Obtained From   patient, electronic medical record    History of Present Illness   This is a 66-year-old gentleman who came to the hospital and admitted because of missing 1 diet dialysis episode and becoming volume overloaded and having acute respiratory failure. Patient has been a patient who uses Pap therapy. The last time he was seen by Dr. Prashanth Pickering in 2019 the patient was on BiPAP therapy apparently. However the patient states that he is on CPAP therapy. Patient was to get a CPAP titration/BiPAP titration however never followed through.     Patient also has a diagnosis of COPD has been taking Stiolto along with albuterol as needed  The last note from Dr. Prashanth Pickering in 2019 showed that he was on Trelegy    Past Medical History     Past Medical History:   Diagnosis Date    Ambulatory dysfunction     walker for long distances, SOB with distance    Aortic stenosis     echo 2017    Arthritis     hands and hips    Asthma     Bilateral hilar adenopathy syndrome 6/3/2013    CAD (coronary artery disease)     Dr. Viridiana Silva [facility]) 04/19/2019    EF= 43%    CHF (congestive heart failure) ([address]     Chronic pain     COPD (chronic obstructive pulmonary disease) ([address]     pulmonology Dr. Sumeet Kevin    Depression     Diabetes mellitus ([address]     borderline    Difficult intravenous access     Emphysema of lung ([address]     ESRD (end stage renal disease) on dialysis ([address]     MWF    Fear of needles     Gastric ulcer     GERD (gastroesophageal reflux disease)     Heart valve problem     bicuspic valve    [facility]od[facility]     History of spinal fracture     work incident    Hx of blood clots     Bilateral lower extremities; stents in place    Hyperlipidemia     Hypertension     MI (myocardial infarction) ([facility] 2019    has had 9 MIs. 2019 was the last    Neuromuscular disorder ([facility]     due to CVA    Numbness and tingling in left arm     from fistula    Pneumonia     PONV (postoperative nausea and vomiting)     Prolonged emergence from general anesthesia     States requires more medication than most people    Sleep apnea     Uses CPAP    Stroke ([facility]     7mm thalamic cva 2017 deficts left side, left side weakness    TIA (transient ischemic attack)     Unspecified diseases of blood and blood-forming organs         Past Surgical History     Past Surgical History:   Procedure Laterality Date    AORTIC VALVE REPLACEMENT N/A 10/15/2019    TRANSCATHETER AORTIC VALVE REPLACEMENT FEMORAL APPROACH performed by Misty Nazario MD at [address] [place], [place] [address] Right 7/2/2019    PERITONEAL DIALYSIS CATHETER REMOVAL performed by Jina Hashimoto, MD at [facility] COLONOSCOPY  2/29/2015    WN    CORONARY ANGIOPLASTY WITH STENT PLACEMENT  05/26/15    CYST REMOVAL  08/14/2013    EXCISION CYSTS, NECK X2 AND ABDOMINAL benign    DIAGNOSTIC CARDIAC CATH LAB PROCEDURE      DIALYSIS FISTULA CREATION Left 10/30/2017    LEFT BRACHIAL CEPHALIC FISTULA    DIALYSIS FISTULA CREATION Left 3/27/2019    LIGATION  AV FISTULA performed by Az Watkins MD at [facility], [place], DIAGNOSTIC      OTHER SURGICAL HISTORY  02/01/2017    laparoscopic cholecystectomy with intraoperative cholangiogram    OTHER SURGICAL HISTORY  2018    PORT PLACEMENT  - vas cath    OTHER SURGICAL HISTORY Bilateral 06/26/2018    laprascopic peritoneal dialysis catheter placement    OTHER SURGICAL HISTORY Right 09/2018    peritoneal dialysis port placed on right side of abdomen    OTHER SURGICAL HISTORY  05/28/2019    PTA/Stenting R External Iliac artery    DC LAP INSERTION TUNNELED INTRAPERITONEAL CATHETER N/A 9/21/2018    LAPAROSCOPIC PERITONEAL DIALYSIS CATHETER REPLACEMENT performed by Felicitas Arceo MD at [facility] ENDOSCOPY  01/06/2016    UPPER GASTROINTESTINAL ENDOSCOPY  01/29/2017    possible candida, otherwise normal appearing    VASCULAR SURGERY  aprx 2 years ago    2 stents placed, each side of groin        Medications     Prior to Admission medications    Medication Sig Start Date End Date Taking? Authorizing Provider   cefUROXime (CEFTIN) 250 MG tablet Take 1 tablet by mouth daily for 5 days Time it after dialysis on dialysis days 8/15/21 8/20/21 Yes Chelsie Mays MD   torsemide (DEMADEX) 100 MG tablet TAKE 1 TO 2 TABLETS BY MOUTH DAILY 8/12/21   Darcie Millan MD   Calcium Acetate, Phos Binder, 667 MG CAPS TAKE 1 CAPSULE BY MOUTH THREE TIMES DAILY WITH MEALS 8/12/21   Darcie Millan MD   pravastatin (PRAVACHOL) 40 MG tablet TAKE (1) TABLET BY MOUTH IN THE EVENING 8/5/21   Darcie Millan MD   QUEtiapine (SEROQUEL) 50 MG tablet TAKE (1) TABLET BY MOUTH IN THE EVENING 8/5/21   Darcie Millan MD   oxyCODONE-acetaminophen (PERCOCET) 7.5-325 MG per tablet Take 1 tablet by mouth every 4-6 hours as needed for Pain for up to 30 days. 8/3/21 9/2/21  Darcie Millan MD   BASAGLAR KWIKPEN 100 UNIT/ML injection pen ADMINISTER 60 UNITS UNDER THE SKIN EVERY NIGHT 7/29/21   Darcie Millan MD   hydrocortisone (ANUSOL-HC) 2.5 % CREA rectal cream Place rectally 2 times daily 6/9/21   Darcie Millan MD   albuterol (PROVENTIL) (2.5 MG/3ML) 0.083% nebulizer solution INHALE 1 VIAL VIA NEBULIZER EVERY 6 HOURS AS NEEDED FOR WHEEZING 6/2/21   Darcie Millan MD   nystatin (MYCOSTATIN) 714968 UNIT/GM cream Apply topically 2 times daily.  5/28/21 Ronnie Mcdaniel MD   famotidine (PEPCID) 20 MG tablet TAKE 1 TABLET BY MOUTH TWICE DAILY AS NEEDED FOR HEARTBURN 5/28/21   Ronnie Mcdaniel MD   hydrOXYzine (VISTARIL) 50 MG capsule TAKE 1 TO 2 CAPSULES BY MOUTH NIGHTLY 5/26/21   Ronnie Mcdaniel MD   gabapentin (NEURONTIN) 100 MG capsule TAKE 1 TO 2 CAPSULES BY MOUTH THREE TIMES A DAY 5/25/21 6/25/21  Ronnie Mcdaniel MD   LINZESS 145 MCG capsule TAKE 1 CAPSULE BY MOUTH EVERY MORNING BEFORE BREAKFAST 5/25/21   Ronnie Mcdaniel MD   hydrALAZINE (APRESOLINE) 50 MG tablet TAKE 1/2 TABLET BY MOUTH EVERY 8 HOURS 5/24/21   Ronnie Mcdaniel MD   traZODone (DESYREL) 150 MG tablet TAKE (1) TABLET BY MOUTH NIGHTLY 5/7/21   JAY Iglesias CNP   predniSONE (DELTASONE) 20 MG tablet Take 3 tablets daily for 3 days, 2 tablets daily for 3 days and 1 tablet daily for 3 days. 5/7/21   JAY Iglesias CNP   dilTIAZem (CARDIZEM CD) 180 MG extended release capsule  4/15/21   Historical Provider, MD   cyclobenzaprine (FLEXERIL) 10 MG tablet TAKE 1 TABLET BY MOUTH EVERY 8 HOURS AS NEEDED 5/3/21   Ronnie Mcdaniel MD   DULoxetine (CYMBALTA) 30 MG extended release capsule TAKE 1 CAPSULE BY MOUTH EVERY DAY 4/27/21   Ronnie Mcdaniel MD   tiZANidine (ZANAFLEX) 4 MG tablet TAKE 1 TABLET BY MOUTH THREE TIMES DAILY 4/27/21   Ronnie Mcdaniel MD   losartan (COZAAR) 50 MG tablet TAKE 1 TABLET BY MOUTH DAILY 3/25/21   Ronnie Mcdaniel MD   pantoprazole (PROTONIX) 40 MG tablet TAKE (1) TABLET BY MOUTH EACH MORNING BEFORE BREAKFAST 3/9/21   Ronnie Mcdaniel MD   simethicone (MYLICON) 80 MG chewable tablet Take 1 tablet by mouth every 6 hours as needed (cramping) 2/3/21   Petr Silva MD   glucose monitoring kit (FREESTYLE) monitoring kit 1 kit by Does not apply route daily 1/8/21   Ronnie Mcdaniel MD   blood glucose test strips (GLUCOSE METER TEST) strip 1 each by In Vitro route 5 times daily As needed.  1/8/21   Ronnie Mcdaniel MD   nitroGLYCERIN (NITROSTAT) 0.4 MG SL tablet DISSOLVE 1 TABLET UNDER THE TONGUE AS NEEDED FOR CHEST PAIN EVERY 5 MINUTES UP TO 3 TIMES. IF NO RELIEF CALL 911. 1/7/21   Ramana Ramos MD   B Complex-C-Folic Acid (VIRT-CAPS) 1 MG CAPS TK ONE C PO  QD 11/18/20   Ramana Ramos MD   citalopram (CELEXA) 40 MG tablet TAKE (1) TABLET BY MOUTH DAILY 11/4/20   Ramana Ramos MD   insulin aspart (NOVOLOG FLEXPEN) 100 UNIT/ML injection pen Inject 20 Units into the skin 3 times daily (before meals) 10/12/20   Ramana Ramos MD   isosorbide mononitrate (IMDUR) 30 MG extended release tablet TAKE 1 TABLET BY MOUTH EVERY DAY 9/1/20   Ramana Ramos MD   ondansetron (ZOFRAN ODT) 4 MG disintegrating tablet Take 1 tablet by mouth every 8 hours as needed for Nausea 8/5/20   Ramana Ramos MD   blood glucose test strips (FREESTYLE LITE) strip Daily As needed.  8/19/19   Ramana Ramos MD   glucose monitoring kit (FREESTYLE) monitoring kit 1 kit by Does not apply route daily 8/19/19   Ramana Ramos MD   vitamin D (ERGOCALCIFEROL) 92084 units CAPS capsule TK 1 C PO WEEKLY 6/2/19   Historical Provider, MD   flunisolide (NASALIDE) 25 MCG/ACT (0.025%) SOLN Inhale 2 sprays into the lungs every 12 hours 5/22/19   Rosaura Bess MD   Tiotropium Bromide-Olodaterol (STIOLTO RESPIMAT) 2.5-2.5 MCG/ACT AERS Inhale 2 puffs into the lungs daily 5/21/19   Rosaura Bess MD   Polyethylene Glycol 3350 GRAN  5/2/18   Historical Provider, MD   Glucose Blood (BLOOD GLUCOSE TEST STRIPS) STRP TEST 3-4 TIMES DAILY, AS DIRECTED 4/25/18   Roberto Emerson MD   Blood Glucose Monitoring Suppl ADAM USE AS DIRECTED. 4/25/18   Roberto Emerson MD   Alcohol Swabs PADS USE AS DIRECTED 4/25/18   Roberto Emerson MD   albuterol sulfate  (90 Base) MCG/ACT inhaler Inhale 2 puffs into the lungs every 6 hours as needed for Wheezing 11/8/17   Chavo Owusu MD   ipratropium-albuterol (DUONEB) 0.5-2.5 (3) MG/3ML SOLN nebulizer solution Inhale 3 mLs into the lungs every 6 hours as needed for Shortness of Breath 10/15/17   Kriss Kim MD Wally   calcium carbonate (TUMS) 500 MG chewable tablet Take 1 tablet by mouth 3 times daily as needed for Heartburn. Historical Provider, MD   aspirin 81 MG chewable tablet Take 1 tablet by mouth daily.   Patient taking differently: Take 81 mg by mouth daily Indications: stopped on 6/25 for surgery  5/14/13   Prem Bingham MD        cefUROXime (CEFTIN) tablet 250 mg, Once  nitroGLYCERIN 50 mg in dextrose 5% 250 mL infusion, Continuous  aspirin chewable tablet 81 mg, Daily  Calcium Acetate (Phos Binder) CAPS 667 mg, TID WC  calcium carbonate (TUMS) chewable tablet 500 mg, TID PRN  citalopram (CELEXA) tablet 40 mg, Daily  cyclobenzaprine (FLEXERIL) tablet 5 mg, TID PRN  dilTIAZem (CARDIZEM CD) extended release capsule 180 mg, Daily  hydrALAZINE (APRESOLINE) tablet 25 mg, TID  hydrOXYzine (VISTARIL) capsule 50 mg, Nightly PRN  isosorbide mononitrate (IMDUR) extended release tablet 30 mg, Daily  linaclotide (LINZESS) capsule 145 mcg, QAM AC  losartan (COZAAR) tablet 50 mg, Daily  pantoprazole (PROTONIX) tablet 40 mg, QAM AC  pravastatin (PRAVACHOL) tablet 40 mg, Nightly  QUEtiapine (SEROQUEL) tablet 50 mg, Nightly  tiotropium-olodaterol (STIOLTO) 2.5-2.5 MCG/ACT inhaler 2 puff, Daily  tiZANidine (ZANAFLEX) tablet 4 mg, TID  torsemide (DEMADEX) tablet 100 mg, Daily  traZODone (DESYREL) tablet 50 mg, Nightly  sodium chloride flush 0.9 % injection 10 mL, PRN  0.9 % sodium chloride infusion, PRN  ondansetron (ZOFRAN-ODT) disintegrating tablet 4 mg, Q8H PRN   Or  ondansetron (ZOFRAN) injection 4 mg, Q6H PRN  acetaminophen (TYLENOL) tablet 650 mg, Q6H PRN   Or  acetaminophen (TYLENOL) suppository 650 mg, Q6H PRN  melatonin tablet 3 mg, Nightly PRN  heparin (porcine) injection 5,000 Units, 3 times per day  albuterol (PROVENTIL) nebulizer solution 2.5 mg, Q4H PRN  insulin lispro (HUMALOG) injection vial 15 Units, Once  heparin (porcine) injection 4,000 Units, PRN  iron sucrose (VENOFER) injection 50 mg, Q MWF  epoetin siddharth-epbx (RETACRIT) injection 4,000 Units, Q MWF  glucose (GLUTOSE) 40 % oral gel 15 g, PRN  dextrose 50 % IV solution, PRN  glucagon (rDNA) injection 1 mg, PRN  dextrose 5 % solution, PRN  insulin glargine (LANTUS) injection vial 60 Units, Nightly  insulin lispro (HUMALOG) injection vial 15 Units, TID WC  insulin lispro (HUMALOG) injection vial 0-18 Units, TID WC  insulin lispro (HUMALOG) injection vial 0-9 Units, Nightly  oxyCODONE-acetaminophen (PERCOCET) 7.5-325 MG per tablet 1 tablet, Q6H PRN        Allergies   Morphine    Social History     Social History     Tobacco History     Smoking Status  Current Every Day Smoker Last attempt to quit  4/26/2020 Smoking Frequency  0.5 packs/day for 33 years (16.5 pk yrs) Smoking Tobacco Type  Cigarettes    Smokeless Tobacco Use  Never Used          Alcohol History     Alcohol Use Status  Not Currently Drinks/Week  0 Standard drinks or equivalent per week Amount  0.0 standard drinks of alcohol/wk Comment  occ          Drug Use     Drug Use Status  No          Sexual Activity     Sexually Active  Yes Partners  Female Comment                  Family History     Family History   Problem Relation Age of Onset    Diabetes Mother     Heart Disease Father     Kidney Disease Sister         stage 4-kidney failure    Cancer Sister     Heart Disease Sister     Obesity Sister     Cancer Sister     Heart Disease Sister     Obesity Sister     Alcohol Abuse Brother        Review of Systems   Review of Systems   Constitutional: Negative. HENT: Negative. Eyes: Negative. Respiratory: Negative. Cardiovascular: Negative. Gastrointestinal: Negative. Endocrine: Negative. Genitourinary: Negative. Musculoskeletal: Negative. Skin: Negative. Allergic/Immunologic: Negative. Neurological: Negative. Hematological: Negative. Psychiatric/Behavioral: Negative.          Physical Exam   /66   Pulse 81   Temp 97.6 °F (36.4 °C) Resp 18   Ht 5' 9\" (1.753 m)   Wt 271 lb 9.6 oz (123.2 kg)   SpO2 93%   BMI 40.11 kg/m²      Physical Exam  Vitals and nursing note reviewed. Constitutional:       General: He is not in acute distress. Appearance: Normal appearance. He is obese. He is not ill-appearing. HENT:      Head: Normocephalic and atraumatic. Right Ear: External ear normal.      Left Ear: External ear normal.      Nose: Nose normal.      Mouth/Throat:      Mouth: Mucous membranes are moist.      Pharynx: Oropharynx is clear. Comments: Mallampati 3  Eyes:      General: No scleral icterus. Extraocular Movements: Extraocular movements intact. Conjunctiva/sclera: Conjunctivae normal.      Pupils: Pupils are equal, round, and reactive to light. Cardiovascular:      Rate and Rhythm: Normal rate and regular rhythm. Pulses: Normal pulses. Heart sounds: Normal heart sounds. No murmur heard. No friction rub. Pulmonary:      Effort: No respiratory distress. Breath sounds: No stridor. No wheezing, rhonchi or rales. Comments: Decreased breath sounds bilaterally  Chest:      Chest wall: No tenderness. Abdominal:      General: Abdomen is flat. Bowel sounds are normal. There is no distension. Tenderness: There is no abdominal tenderness. There is no guarding. Musculoskeletal:         General: No tenderness. Normal range of motion. Cervical back: Normal range of motion and neck supple. No rigidity. Right lower leg: Edema present. Left lower leg: Edema present. Skin:     General: Skin is warm and dry. Coloration: Skin is not jaundiced. Neurological:      General: No focal deficit present. Mental Status: He is alert and oriented to person, place, and time. Mental status is at baseline. Cranial Nerves: No cranial nerve deficit. Sensory: No sensory deficit. Motor: No weakness.       Gait: Gait normal.   Psychiatric:         Mood and Affect: Mood normal. Thought Content: Thought content normal.         Judgment: Judgment normal.         Labs    CBC:  Recent Labs     08/13/21  1000 08/14/21  0454 08/15/21  0526   WBC 10.9 9.7 8.3   RBC 3.18* 3.16* 2.99*   HGB 9.8* 9.9* 9.3*   HCT 29.2* 29.2* 27.7*   MCV 91.8 92.3 92.4   RDW 16.1* 16.1* 16.2*    164 164     CHEMISTRIES:  Recent Labs     08/13/21  1000 08/14/21  0455 08/15/21  0526   * 132* 132*   K 3.3* 3.8 4.1   CL 96* 95* 95*   CO2 20* 23 22   BUN 89* 50* 59*   CREATININE 7.5* 5.1* 6.2*   GLUCOSE 150* 170* 83   PHOS  --  5.9* 6.6*   MG 2.00 1.80 1.80     PT/INR:No results for input(s): PROTIME, INR in the last 72 hours. APTT:No results for input(s): APTT in the last 72 hours. LIVER PROFILE:  Recent Labs     08/12/21  2220   AST 12*   ALT 16   BILITOT 0.3   ALKPHOS 101       Imaging/Diagnostics   XR CHEST PORTABLE    Result Date: 8/12/2021  Questionable right upper and basilar opacities. Asymmetric pulmonary edema and pneumonia are in the differential.       Assessment      Hospital Problems         Last Modified POA    * (Principal) ESRD (end stage renal disease) on dialysis ([facility] 8/13/2021 Yes    CAD S/P percutaneous coronary angioplasty 8/12/2021 Yes    Chronic obstructive pulmonary disease ([facility] (Chronic) 8/12/2021 Yes    Essential hypertension (Chronic) 8/12/2021 Yes    S/P TAVR (transcatheter aortic valve replacement) 8/12/2021 Yes    Obesity (BMI 30-39. 9) 8/12/2021 Yes    ZAINAB on CPAP (Chronic) 8/12/2021 Yes    Pulmonary edema 8/12/2021 Yes    Type 2 diabetes mellitus with hyperglycemia ([facility] 8/12/2021 Yes    Acute hypoxemic respiratory failure ([facility] 8/12/2021 Yes    CHF (congestive heart failure) ([facility] 8/13/2021 Yes          Plan   1.   Patient should be sent home on his home regiment of Stiolto and albuterol for his COPD  He will need to follow-up with Dr. Ean Aguillon in regards to repeat PFT and advancement of therapy if necessary    I would advise the patient also not to miss any dialysis    Patient apparently was on a CPAP machine however the note from 2019 from Dr. Ellen Soto said that he was in a BiPAP therapy  Either way he has not been seen by Dr. Ellen Soto in over 2 years and I would recommend that he come back to the office in order to get a evaluation and see if he needs to be changed from a CPAP to BiPAP therapy    Okay to go home and follow-up in the office      Electronically signed by Leda Paul MD on 8/15/21 at 11:59 AM EDT

## 2021-08-15 NOTE — PROGRESS NOTES
08/14/21 2107   NIV Type   NIV Started/Stopped On   Equipment Type v60   Mode Bilevel   Mask Type Full face mask   Mask Size Large   Settings/Measurements   IPAP 20 cmH20   CPAP/EPAP 10 cmH2O   Rate Ordered 8   Resp 14   FiO2  35 %  (turned O2 to .30 at this time )   Vt Exhaled 1337 mL   Minute Volume 18.7 Liters   Mask Leak (lpm) 18 lpm   Comfort Level Good   Using Accessory Muscles No   SpO2 100   Breath Sounds   Right Upper Lobe Clear   Right Middle Lobe Diminished   Right Lower Lobe Diminished   Left Upper Lobe Clear   Left Lower Lobe Diminished   Alarm Settings   Alarms On Y   Press Low Alarm 6 cmH2O   High Pressure Alarm 30 cmH2O   Delay Alarm 20 sec(s)   Resp Rate Low Alarm 6   High Respiratory Rate 40 br/min

## 2021-08-15 NOTE — PROGRESS NOTES
[email or web address]  Nephrology Follow up Note           Reason for Consult:  ESRD  Requesting Physician:  Dr. Makenzie Gant history  Feels fine   HD on 8/13 on 8/13 w net 2.2l UF; 121.5--> 117.7 kg, c/b cramps and HD stopped at 3h     ROS: No chest pain/shortness of breath/fever/nausea/vomiting  PSFH: No visitor    Scheduled Meds:   aspirin  81 mg Oral Daily    Calcium Acetate (Phos Binder)  667 mg Oral TID WC    citalopram  40 mg Oral Daily    dilTIAZem  180 mg Oral Daily    hydrALAZINE  25 mg Oral TID    isosorbide mononitrate  30 mg Oral Daily    linaclotide  145 mcg Oral QAM AC    losartan  50 mg Oral Daily    pantoprazole  40 mg Oral QAM AC    pravastatin  40 mg Oral Nightly    QUEtiapine  50 mg Oral Nightly    tiotropium-olodaterol  2 puff Inhalation Daily    tiZANidine  4 mg Oral TID    torsemide  100 mg Oral Daily    traZODone  50 mg Oral Nightly    heparin (porcine)  5,000 Units Subcutaneous 3 times per day    insulin lispro  15 Units Subcutaneous Once    iron sucrose  50 mg Intravenous Q MWF    epoetin siddharth-epbx  4,000 Units Subcutaneous Q MWF    insulin glargine  60 Units Subcutaneous Nightly    insulin lispro  15 Units Subcutaneous TID WC    insulin lispro  0-18 Units Subcutaneous TID WC    insulin lispro  0-9 Units Subcutaneous Nightly     Continuous Infusions:   nitroGLYCERIN Stopped (08/13/21 0000)    sodium chloride      dextrose       PRN Meds:.calcium carbonate, cyclobenzaprine, hydrOXYzine, sodium chloride flush, sodium chloride, ondansetron **OR** ondansetron, acetaminophen **OR** acetaminophen, melatonin, albuterol, heparin (porcine), glucose, dextrose, glucagon (rDNA), dextrose, oxyCODONE-acetaminophen    History of Present Illness on 8/13/21:    46 y.o. yo male with PMH of ESRD, HTN who is admitted for resp failure  He missed his HD on 8/11 d/t his ride not coming to pick him up  He presented to ED w increased sob and felt better on BIPAP; also needed ntg gtt. Both have been weaned off at HD  HD performed with 2.5l UF on 8/13 and had to be shortened to 2h 50m d/t severe cramps    Physical exam:   Constitutional:  VITALS:  /66   Pulse 81   Temp 97.6 °F (36.4 °C)   Resp 18   Ht 5' 9\" (1.753 m)   Wt 271 lb 9.6 oz (123.2 kg)   SpO2 93%   BMI 40.11 kg/m²   Gen: alert, awake, nad  HEENT: pupils reactive  Neck: no bruits or jvd noted  Cardiovascular:  S1, S2 without m/r/g; 1+ lower extremity edema  Respiratory: CTA B without w/r/r; respiratory effort normal  Abdomen:  +bs, soft, nt, nd, no hepatosplenomegaly  Neuro/Psy: AAoriented times 3 ; moves all 4 ext    Data/  Recent Labs     08/13/21  1000 08/14/21  0454 08/15/21  0526   WBC 10.9 9.7 8.3   HGB 9.8* 9.9* 9.3*   HCT 29.2* 29.2* 27.7*   MCV 91.8 92.3 92.4    164 164     Recent Labs     08/13/21  1000 08/14/21  0455 08/15/21  0526   * 132* 132*   K 3.3* 3.8 4.1   CL 96* 95* 95*   CO2 20* 23 22   GLUCOSE 150* 170* 83   PHOS  --  5.9* 6.6*   MG 2.00 1.80 1.80   BUN 89* 50* 59*   CREATININE 7.5* 5.1* 6.2*   LABGLOM 8* 12* 10*   GFRAA 9* 14* 12*       Assessment  -ESRD on HD MWF   -Acute resp failure on BIPAP from volume overload  -DM, uncontrolled  - Hyponatremia from vol overload and hyperglycemia  -hypokalemia- no need to replace k  -Anemia  -CKD/MBD  -HTN    Plan  -HD per schedule   S/p 2.5l net UF on 8/13   Current OP TW of 119 kg --> to 118.5 kg   -renal dose meds  -dispo per IM, ok to dc from renal std pt    Thank you for the consultation. Please do not hesitate to call with questions.     Raya Wise MD  [facility]  Office:   Fax:    [phone]

## 2021-08-15 NOTE — DISCHARGE SUMMARY
Hospital Medicine Discharge Summary    Patient ID: April Vo      Patient's PCP: Lea Mahan MD    Admit Date: 8/12/2021     Discharge Date:    08/14/21     Admitting Physician: Saumya Escobar MD     Discharge Physician: Enoc Segal MD     Discharge Diagnoses: Active Hospital Problems    Diagnosis     S/P TAVR (transcatheter aortic valve replacement) [Z95.2]      Priority: Medium    Essential hypertension [I10]      Priority: Medium    Chronic obstructive pulmonary disease (HCC) [J44.9]      Priority: Medium    CHF (congestive heart failure) (HCC) [I50.9]     Acute hypoxemic respiratory failure (HCC) [J96.01]     Type 2 diabetes mellitus with hyperglycemia (HCC) [E11.65]     Pulmonary edema [J81.1]     ESRD (end stage renal disease) on dialysis ([address] [N18.6, Z99.2]     ZAINAB on CPAP [G47.33, Z99.89]     Obesity (BMI 30-39. 9) [E66.9]     CAD S/P percutaneous coronary angioplasty [I25.10, Z98.61]        The patient was seen and examined on day of discharge and this discharge summary is in conjunction with any daily progress note from day of discharge. Hospital Course: Patient was admitted with acute shortness of breath following a missed dialysis. Empirically started on antibiotics for possibility of pneumonia, though there is no growth on culture. Initially required oxygen, reverted to room air after having dialysis as inpatient. Patient is currently cleared by nephrology for discharge. Vitals are normal and patient is on room air. Was kept on IV cefepime as inpatient. Will be discharged on cefuroxime to complete the empiric antibiotic treatment. We do not have enough evidence for or against pneumonia and I decided to finish the antibiotic course. Patient requested a pulmonology evaluation prior to discharge. Was seen by pulmonologist for obstructive sleep apnea. We will follow-up as outpatient to evaluate the best possible treatment for his obstructive sleep apnea.   Has CPAP at home, which is not helpful according to the patient. Apparently, patient did a lot better with BiPAP. On the day of discharge, patient was asymptomatic on room air. Was satisfied with pulmonology assessment and will be discharged home. Clinically stable. Addendum:  Initial discharge order was placed on 8/13, which was later canceled for logistics. Final discharge was made on 8/14    Physical Exam Performed:     /66   Pulse 81   Temp 97.6 °F (36.4 °C)   Resp 18   Ht 5' 9\" (1.753 m)   Wt 271 lb 9.6 oz (123.2 kg)   SpO2 93%   BMI 40.11 kg/m²       General appearance:  No apparent distress, appears stated age and cooperative. HEENT:  Normal cephalic, atraumatic without obvious deformity. Pupils equal, round, and reactive to light. Extra ocular muscles intact. Conjunctivae/corneas clear. Neck: Supple, with full range of motion. No jugular venous distention. Trachea midline. Respiratory:  Normal respiratory effort. Clear to auscultation, bilaterally without Rales/Wheezes/Rhonchi. Cardiovascular:  Regular rate and rhythm with normal S1/S2 without murmurs, rubs or gallops. Abdomen: Soft, non-tender, non-distended with normal bowel sounds. Musculoskeletal:  No clubbing, cyanosis or edema bilaterally. Full range of motion without deformity. Skin: Skin color, texture, turgor normal.  No rashes or lesions. Neurologic:  Neurovascularly intact without any focal sensory/motor deficits. Cranial nerves: II-XII intact, grossly non-focal.  Psychiatric:  Alert and oriented, thought content appropriate, normal insight  Capillary Refill: Brisk,< 3 seconds   Peripheral Pulses: +2 palpable, equal bilaterally       Labs:  For convenience and continuity at follow-up the following most recent labs are provided:      CBC:    Lab Results   Component Value Date    WBC 8.3 08/15/2021    HGB 9.3 08/15/2021    HCT 27.7 08/15/2021     08/15/2021       Renal:    Lab Results   Component Value Date     08/15/2021    K 4.1 08/15/2021    K 3.3 08/13/2021    CL 95 08/15/2021    CO2 22 08/15/2021    BUN 59 08/15/2021    CREATININE 6.2 08/15/2021    CALCIUM 7.9 08/15/2021    PHOS 6.6 08/15/2021         Significant Diagnostic Studies    Radiology:   XR CHEST PORTABLE   Final Result   Questionable right upper and basilar opacities. Asymmetric pulmonary edema   and pneumonia are in the differential.                Consults:     IP CONSULT TO NEPHROLOGY  IP CONSULT TO HOSPITALIST  IP CONSULT TO PULMONOLOGY    Disposition:  Home     Condition at Discharge: Stable    Discharge Instructions/Follow-up:  PCP and pulmonology    Code Status:  Full Code     Activity: activity as tolerated    Diet: diabetic diet      Discharge Medications:     Current Discharge Medication List             Details   cefUROXime (CEFTIN) 250 MG tablet Take 1 tablet by mouth daily for 5 days Time it after dialysis on dialysis days  Qty: 5 tablet, Refills: 0                Details   torsemide (DEMADEX) 100 MG tablet TAKE 1 TO 2 TABLETS BY MOUTH DAILY  Qty: 180 tablet, Refills: 3      Calcium Acetate, Phos Binder, 667 MG CAPS TAKE 1 CAPSULE BY MOUTH THREE TIMES DAILY WITH MEALS  Qty: 90 capsule, Refills: 3      pravastatin (PRAVACHOL) 40 MG tablet TAKE (1) TABLET BY MOUTH IN THE EVENING  Qty: 30 tablet, Refills: 1      QUEtiapine (SEROQUEL) 50 MG tablet TAKE (1) TABLET BY MOUTH IN THE EVENING  Qty: 30 tablet, Refills: 2    Associated Diagnoses: Insomnia, unspecified type; Mood disorder (HCC)      oxyCODONE-acetaminophen (PERCOCET) 7.5-325 MG per tablet Take 1 tablet by mouth every 4-6 hours as needed for Pain for up to 30 days.   Qty: 150 tablet, Refills: 0    Comments: Reduce doses taken as pain becomes manageable  Associated Diagnoses: Cellulitis, unspecified cellulitis site      BASAGLAR KWIKPEN 100 UNIT/ML injection pen ADMINISTER 60 UNITS UNDER THE SKIN EVERY NIGHT  Qty: 15 mL, Refills: 5    Associated Diagnoses: Type 2 diabetes mellitus with diabetic nephropathy, with long-term current use of insulin (HCC)      hydrocortisone (ANUSOL-HC) 2.5 % CREA rectal cream Place rectally 2 times daily  Qty: 30 g, Refills: 0    Associated Diagnoses: Other hemorrhoids      albuterol (PROVENTIL) (2.5 MG/3ML) 0.083% nebulizer solution INHALE 1 VIAL VIA NEBULIZER EVERY 6 HOURS AS NEEDED FOR WHEEZING  Qty: 300 mL, Refills: 5      nystatin (MYCOSTATIN) 692183 UNIT/GM cream Apply topically 2 times daily. Qty: 60 g, Refills: 3    Associated Diagnoses: Yeast dermatitis      famotidine (PEPCID) 20 MG tablet TAKE 1 TABLET BY MOUTH TWICE DAILY AS NEEDED FOR HEARTBURN  Qty: 180 tablet, Refills: 0    Comments: **Patient requests 90 days supply**  Associated Diagnoses: Peripheral polyneuropathy      hydrOXYzine (VISTARIL) 50 MG capsule TAKE 1 TO 2 CAPSULES BY MOUTH NIGHTLY  Qty: 60 capsule, Refills: 5      gabapentin (NEURONTIN) 100 MG capsule TAKE 1 TO 2 CAPSULES BY MOUTH THREE TIMES A DAY  Qty: 180 capsule, Refills: 5    Associated Diagnoses: Chronic pain syndrome      LINZESS 145 MCG capsule TAKE 1 CAPSULE BY MOUTH EVERY MORNING BEFORE BREAKFAST  Qty: 30 capsule, Refills: 10    Associated Diagnoses: Chronic idiopathic constipation      hydrALAZINE (APRESOLINE) 50 MG tablet TAKE 1/2 TABLET BY MOUTH EVERY 8 HOURS  Qty: 90 tablet, Refills: 2      traZODone (DESYREL) 150 MG tablet TAKE (1) TABLET BY MOUTH NIGHTLY  Qty: 30 tablet, Refills: 10      predniSONE (DELTASONE) 20 MG tablet Take 3 tablets daily for 3 days, 2 tablets daily for 3 days and 1 tablet daily for 3 days.   Qty: 18 tablet, Refills: 0      dilTIAZem (CARDIZEM CD) 180 MG extended release capsule       cyclobenzaprine (FLEXERIL) 10 MG tablet TAKE 1 TABLET BY MOUTH EVERY 8 HOURS AS NEEDED  Qty: 90 tablet, Refills: 5      DULoxetine (CYMBALTA) 30 MG extended release capsule TAKE 1 CAPSULE BY MOUTH EVERY DAY  Qty: 30 capsule, Refills: 10    Associated Diagnoses: Depression, unspecified depression type      tiZANidine (ZANAFLEX) 4 MG tablet TAKE 1 TABLET BY MOUTH THREE TIMES DAILY  Qty: 90 tablet, Refills: 10      losartan (COZAAR) 50 MG tablet TAKE 1 TABLET BY MOUTH DAILY  Qty: 30 tablet, Refills: 10      pantoprazole (PROTONIX) 40 MG tablet TAKE (1) TABLET BY MOUTH EACH MORNING BEFORE BREAKFAST  Qty: 90 tablet, Refills: 1      simethicone (MYLICON) 80 MG chewable tablet Take 1 tablet by mouth every 6 hours as needed (cramping)  Qty: 15 tablet, Refills: 0      !! glucose monitoring kit (FREESTYLE) monitoring kit 1 kit by Does not apply route daily  Qty: 1 kit, Refills: 0    Comments: Which ever is coverd. !! blood glucose test strips (GLUCOSE METER TEST) strip 1 each by In Vitro route 5 times daily As needed. Qty: 100 each, Refills: 3      nitroGLYCERIN (NITROSTAT) 0.4 MG SL tablet DISSOLVE 1 TABLET UNDER THE TONGUE AS NEEDED FOR CHEST PAIN EVERY 5 MINUTES UP TO 3 TIMES. IF NO RELIEF CALL 911. Qty: 25 tablet, Refills: 10    Associated Diagnoses: Coronary artery disease involving native heart without angina pectoris, unspecified vessel or lesion type      B Complex-C-Folic Acid (VIRT-CAPS) 1 MG CAPS TK ONE C PO  QD  Qty: 90 capsule, Refills: 1      citalopram (CELEXA) 40 MG tablet TAKE (1) TABLET BY MOUTH DAILY  Qty: 30 tablet, Refills: 10    Associated Diagnoses: Depression, unspecified depression type      insulin aspart (NOVOLOG FLEXPEN) 100 UNIT/ML injection pen Inject 20 Units into the skin 3 times daily (before meals)  Qty: 15 pen, Refills: 5    Associated Diagnoses: Type 2 diabetes mellitus with diabetic nephropathy, with long-term current use of insulin ([facility])      isosorbide mononitrate (IMDUR) 30 MG extended release tablet TAKE 1 TABLET BY MOUTH EVERY DAY  Qty: 90 tablet, Refills: 10      ondansetron (ZOFRAN ODT) 4 MG disintegrating tablet Take 1 tablet by mouth every 8 hours as needed for Nausea  Qty: 60 tablet, Refills: 0      !! blood glucose test strips (FREESTYLE LITE) strip Daily As needed.   Qty: 100 strip, Refills: 3    Comments: Please dispense what is covered by insurance      !! glucose monitoring kit (FREESTYLE) monitoring kit 1 kit by Does not apply route daily  Qty: 1 kit, Refills: 0    Comments: Please dispense what is covered by insurance      vitamin D (ERGOCALCIFEROL) 16165 units CAPS capsule TK 1 C PO WEEKLY  Refills: 11      flunisolide (NASALIDE) 25 MCG/ACT (0.025%) SOLN Inhale 2 sprays into the lungs every 12 hours  Qty: 1 Bottle, Refills: 5      Tiotropium Bromide-Olodaterol (STIOLTO RESPIMAT) 2.5-2.5 MCG/ACT AERS Inhale 2 puffs into the lungs daily  Qty: 2 Inhaler, Refills: 0    Comments: 2 samples given:  Lot #[id number], Exp 7/21 & Lot #[id number], Exp 7/21      Polyethylene Glycol 3350 GRAN       !! Glucose Blood (BLOOD GLUCOSE TEST STRIPS) STRP TEST 3-4 TIMES DAILY, AS DIRECTED  Qty: 100 strip, Refills: 3      Blood Glucose Monitoring Suppl ADAM USE AS DIRECTED. Qty: 1 Device, Refills: 0    Comments: WITH CONTROL SOLUTION. Alcohol Swabs PADS USE AS DIRECTED  Qty: 300 each, Refills: 3      albuterol sulfate  (90 Base) MCG/ACT inhaler Inhale 2 puffs into the lungs every 6 hours as needed for Wheezing  Qty: 1 Inhaler, Refills: 3      ipratropium-albuterol (DUONEB) 0.5-2.5 (3) MG/3ML SOLN nebulizer solution Inhale 3 mLs into the lungs every 6 hours as needed for Shortness of Breath  Qty: 360 mL, Refills: 1      calcium carbonate (TUMS) 500 MG chewable tablet Take 1 tablet by mouth 3 times daily as needed for Heartburn. aspirin 81 MG chewable tablet Take 1 tablet by mouth daily. Qty: 30 tablet, Refills: 2       !! - Potential duplicate medications found. Please discuss with provider. Time Spent on discharge is more than 45 minutes in the examination, evaluation, counseling and review of medications and discharge plan. Signed:    Barb Roy MD   8/15/2021      Thank you Aixa Mijares MD for the opportunity to be involved in this patient's care.  If you have any questions or concerns please feel free to contact me at [phone].

## 2021-08-15 NOTE — PROGRESS NOTES
Tori Harper   Patient: henok reeves   0'\"   8/15/21 9:31 AM   [phone] Hospital or Facility: [facility] From: [facility] RE: henok reeves 1970 RM: [id number] pt lost iv access during the night. pt did not get 6 a.m. iv abx. pt refuses another iv access. please review and advise.  ty Need Callback: NO CALLBACK REQ C4 PROGRESSIVE CARE  Unread

## 2021-08-16 LAB
BLOOD CULTURE, ROUTINE: NORMAL
CULTURE, BLOOD 2: NORMAL

## 2021-08-17 ENCOUNTER — NURSE TRIAGE (OUTPATIENT)
Dept: OTHER | Facility: CLINIC | Age: 51
End: 2021-08-17

## 2021-08-17 NOTE — TELEPHONE ENCOUNTER
Reason for Disposition   SEVERE swelling (e.g., swelling extends above knee, entire leg is swollen, weeping fluid)    Answer Assessment - Initial Assessment Questions  1. ONSET: \"When did the swelling start? \" (e.g., minutes, hours, days)      \"swollen all the time- to where I can't walk hardly\" - just released from the hospital on Saturday for this problem   - started a couple of months ago     2. LOCATION: \"What part of the leg is swollen? \"  \"Are both legs swollen or just one leg? \"      Both legs are swollen- left is worse than the right     3. SEVERITY: \"How bad is the swelling? \" (e.g., localized; mild, moderate, severe)   - Localized - small area of swelling localized to one leg   - MILD pedal edema - swelling limited to foot and ankle, pitting edema < 1/4 inch (6 mm) deep, rest and elevation eliminate most or all swelling   - MODERATE edema - swelling of lower leg to knee, pitting edema > 1/4 inch (6 mm) deep, rest and elevation only partially reduce swelling   - SEVERE edema - swelling extends above knee, facial or hand swelling present       Severe swelling- extends the knee- face and hand swelling     4. REDNESS: \"Does the swelling look red or infected? \"      Denies     5. PAIN: \"Is the swelling painful to touch? \" If so, ask: \"How painful is it? \"   (Scale 1-10; mild, moderate or severe)      Right leg is painful to the touch in the thigh- pt states the leg was painful to the touch in the hospital     6. FEVER: \"Do you have a fever? \" If so, ask: \"What is it, how was it measured, and when did it start? \"       Denies     7. CAUSE: \"What do you think is causing the leg swelling? \"      Unsure     8. MEDICAL HISTORY: \"Do you have a history of heart failure, kidney disease, liver failure, or cancer? \"      Heart failure, kidney disease     9. RECURRENT SYMPTOM: \"Have you had leg swelling before? \" If so, ask: \"When was the last time? \" \"What happened that time? \"      Yes- was hospitalized last Saturday for a \"couple of days for fluid overload\"    10. OTHER SYMPTOMS: \"Do you have any other symptoms? \" (e.g., chest pain, difficulty breathing)        Denies- \" I had chest pain while I was in the hospital- not now\"- pt was told to follow up with PCP     11. PREGNANCY: \"Is there any chance you are pregnant? \" \"When was your last menstrual period? \"        N/A    Protocols used: LEG SWELLING AND EDEMA-ADULT-OH    Received call from Katie  at [facility] with Red Flag Complaint. Brief description of triage: bilateral leg swelling- swelling that extends above the knee with facial swelling. Pt states he was just hospitalized for \"fluid overload\" - He reports some right thigh pain. Triage indicates for patient to Go to [address] or PCP office with approval. Pt would like to make a follow up appointment. Discussed that symptoms reported could be potentially life threatening. Pt would like to proceed with an office appointment. 2nd level triage completed; consulted with Dr. Valentina Mane , provider recommends that patient go to ED today. Pt does not want to proceed to ED. Reinforced MD recommendations. Discussed with pt that following MD recommendations is the best course of care for symptoms reported. Pt would like to proceed with an office appointment. [facility] - spoke with Yvan Luna- notified office that pt refused MD recommendation and would like to proceed with an office appointment. Transferred pt to office for appointment scheduling per request.     Care advice provided, patient verbalizes understanding; denies any other questions or concerns; instructed to call back for any new or worsening symptoms. Attention Provider: Thank you for allowing me to participate in the care of your patient. The patient was connected to triage in response to information provided to the [facility]. Please do not respond through this encounter as the response is not directed to a shared pool.

## 2021-08-19 NOTE — CONSULTS
BILITOT <0.2 06/08/2020     1. Intramuscular edema and blood products involving the semimembranosus   muscle along its mid aspect consistent with intramuscular hematoma and   high-grade muscular strain.  The hematoma is difficult to accurately measure   given the interdigitation of muscle fibers but spans approximately 12 cm in   cranial caudal dimension and measures approximately 3.5 x 4.7 cm in greatest   axial dimension.  Surrounding subcutaneous edema. 2. No acute osseous abnormality identified. Assessment:  1. Fall   2. Intramuscular hematoma   3. ZAINAB   4. CAD     Recommendations:  Patient with new functional deficits and ongoing medical complexity. Demonstrates ability to tolerate 3 hours therapy/day. He is a good candidate for acute inpatient rehab when medically appropriate. Thank you for this consult. Please contact me with any questions or concerns. Ciro Ruiz MD 6/8/2020, 12:50 PM Otezla Counseling: The side effects of Otezla were discussed with the patient, including but not limited to worsening or new depression, weight loss, diarrhea, nausea, upper respiratory tract infection, and headache. Patient instructed to call the office should any adverse effect occur.  The patient verbalized understanding of the proper use and possible adverse effects of Otezla.  All the patient's questions and concerns were addressed.

## 2021-08-20 NOTE — PROGRESS NOTES
Physician Progress Note      PATIENT:               Kirby Rendon  CSN #:                  [id number]  :                       1970  ADMIT DATE:       2021 10:15 PM  [address] DATE:        2021 2:50 PM  RESPONDING  PROVIDER #:        Fely Quintero MD          QUERY TEXT:    Pt admitted with fluid overload. Pt noted to have \"SIRS. ..w/out clear evidence   of infection\" and later treated for possible pneumonia . If possible, please   document in the progress notes and discharge summary if you are evaluating and   /or treating any of the following: The medical record reflects the following:  Risk Factors: ESRD, COPD, ZAINAB on CPAP  Clinical Indicators: on arrival WBC 20, LA 3.1, notes \"SIRS - w/   Leukocytosis/Tachycardia//Elevated Lactate POArrival w/out clear evidence of   infection\", on DC summary noted \"Empirically started on antibiotics for   possibility of pneumonia\"  Treatment: antibiotic, IVF, imaging, labs, supportive care and monitoring    Thank you,  Hussein Huizar RN [facility]  [phone]  Options provided:  -- Sepsis, present on arrival  -- Possible pneumonia without Sepsis  -- Other - I will add my own diagnosis  -- Disagree - Not applicable / Not valid  -- Disagree - Clinically unable to determine / Unknown  -- Refer to Clinical Documentation Reviewer    PROVIDER RESPONSE TEXT:    This patient was treated for possible pneumonia without Sepsis. Query created by:  Gabriel Muniz on 2021 12:38 PM      Electronically signed by:  Fely Quintero MD 2021 12:43 PM

## 2021-08-29 ENCOUNTER — HOSPITAL ENCOUNTER (INPATIENT)
Age: 51
LOS: 5 days | Discharge: HOME HEALTH | DRG: 871 | End: 2021-09-03
Attending: EMERGENCY MEDICINE | Admitting: INTERNAL MEDICINE
Payer: MEDICARE

## 2021-08-29 ENCOUNTER — APPOINTMENT (OUTPATIENT)
Dept: GENERAL RADIOLOGY | Age: 51
DRG: 871 | End: 2021-08-29
Payer: MEDICARE

## 2021-08-29 DIAGNOSIS — R06.00 DYSPNEA AND RESPIRATORY ABNORMALITIES: ICD-10-CM

## 2021-08-29 DIAGNOSIS — R06.89 DYSPNEA AND RESPIRATORY ABNORMALITIES: ICD-10-CM

## 2021-08-29 DIAGNOSIS — J44.1 COPD EXACERBATION (HCC): Primary | ICD-10-CM

## 2021-08-29 DIAGNOSIS — R09.02 HYPOXIA: ICD-10-CM

## 2021-08-29 DIAGNOSIS — R07.9 CHEST PAIN, UNSPECIFIED TYPE: ICD-10-CM

## 2021-08-29 PROBLEM — E66.9 OBESITY: Status: ACTIVE | Noted: 2020-11-03

## 2021-08-29 LAB
A/G RATIO: 1 (ref 1.1–2.2)
ALBUMIN SERPL-MCNC: 3.7 G/DL (ref 3.4–5)
ALP BLD-CCNC: 92 U/L (ref 40–129)
ALT SERPL-CCNC: 17 U/L (ref 10–40)
ANION GAP SERPL CALCULATED.3IONS-SCNC: 17 MMOL/L (ref 3–16)
AST SERPL-CCNC: 13 U/L (ref 15–37)
BASE EXCESS VENOUS: -7.6 MMOL/L (ref -3–3)
BASOPHILS ABSOLUTE: 0.2 K/UL (ref 0–0.2)
BASOPHILS RELATIVE PERCENT: 0.9 %
BILIRUB SERPL-MCNC: <0.2 MG/DL (ref 0–1)
BUN BLDV-MCNC: 72 MG/DL (ref 7–20)
CALCIUM SERPL-MCNC: 8.7 MG/DL (ref 8.3–10.6)
CARBOXYHEMOGLOBIN: 5.9 % (ref 0–1.5)
CHLORIDE BLD-SCNC: 93 MMOL/L (ref 99–110)
CO2: 21 MMOL/L (ref 21–32)
CREAT SERPL-MCNC: 7.4 MG/DL (ref 0.9–1.3)
EOSINOPHILS ABSOLUTE: 0.2 K/UL (ref 0–0.6)
EOSINOPHILS RELATIVE PERCENT: 1.3 %
GFR AFRICAN AMERICAN: 9
GFR NON-AFRICAN AMERICAN: 8
GLOBULIN: 3.8 G/DL
GLUCOSE BLD-MCNC: 525 MG/DL (ref 70–99)
GLUCOSE BLD-MCNC: 595 MG/DL (ref 70–99)
GLUCOSE BLD-MCNC: >600 MG/DL (ref 70–99)
GLUCOSE BLD-MCNC: >600 MG/DL (ref 70–99)
HCO3 VENOUS: 21.9 MMOL/L (ref 23–29)
HCT VFR BLD CALC: 34.3 % (ref 40.5–52.5)
HEMOGLOBIN: 11 G/DL (ref 13.5–17.5)
LACTIC ACID, SEPSIS: 2.8 MMOL/L (ref 0.4–1.9)
LACTIC ACID: 2.6 MMOL/L (ref 0.4–2)
LYMPHOCYTES ABSOLUTE: 1.7 K/UL (ref 1–5.1)
LYMPHOCYTES RELATIVE PERCENT: 10.4 %
MCH RBC QN AUTO: 30.9 PG (ref 26–34)
MCHC RBC AUTO-ENTMCNC: 32.1 G/DL (ref 31–36)
MCV RBC AUTO: 96.4 FL (ref 80–100)
METHEMOGLOBIN VENOUS: 0.5 %
MONOCYTES ABSOLUTE: 1.3 K/UL (ref 0–1.3)
MONOCYTES RELATIVE PERCENT: 8 %
NEUTROPHILS ABSOLUTE: 13 K/UL (ref 1.7–7.7)
NEUTROPHILS RELATIVE PERCENT: 79.4 %
O2 SAT, VEN: 95 %
O2 THERAPY: ABNORMAL
PCO2, VEN: 62.4 MMHG (ref 40–50)
PDW BLD-RTO: 16.8 % (ref 12.4–15.4)
PERFORMED ON: ABNORMAL
PH VENOUS: 7.16 (ref 7.35–7.45)
PLATELET # BLD: 363 K/UL (ref 135–450)
PMV BLD AUTO: 8.5 FL (ref 5–10.5)
PO2, VEN: 92.1 MMHG (ref 25–40)
POTASSIUM REFLEX MAGNESIUM: 4.9 MMOL/L (ref 3.5–5.1)
PRO-BNP: ABNORMAL PG/ML (ref 0–124)
RBC # BLD: 3.55 M/UL (ref 4.2–5.9)
SARS-COV-2, NAAT: NOT DETECTED
SODIUM BLD-SCNC: 131 MMOL/L (ref 136–145)
TCO2 CALC VENOUS: 24 MMOL/L
TOTAL PROTEIN: 7.5 G/DL (ref 6.4–8.2)
TROPONIN: 0.13 NG/ML
WBC # BLD: 16.4 K/UL (ref 4–11)

## 2021-08-29 PROCEDURE — 96375 TX/PRO/DX INJ NEW DRUG ADDON: CPT

## 2021-08-29 PROCEDURE — 84484 ASSAY OF TROPONIN QUANT: CPT

## 2021-08-29 PROCEDURE — 71045 X-RAY EXAM CHEST 1 VIEW: CPT

## 2021-08-29 PROCEDURE — 80053 COMPREHEN METABOLIC PANEL: CPT

## 2021-08-29 PROCEDURE — 2700000000 HC OXYGEN THERAPY PER DAY

## 2021-08-29 PROCEDURE — 82947 ASSAY GLUCOSE BLOOD QUANT: CPT

## 2021-08-29 PROCEDURE — 6370000000 HC RX 637 (ALT 250 FOR IP): Performed by: EMERGENCY MEDICINE

## 2021-08-29 PROCEDURE — U0003 INFECTIOUS AGENT DETECTION BY NUCLEIC ACID (DNA OR RNA); SEVERE ACUTE RESPIRATORY SYNDROME CORONAVIRUS 2 (SARS-COV-2) (CORONAVIRUS DISEASE [COVID-19]), AMPLIFIED PROBE TECHNIQUE, MAKING USE OF HIGH THROUGHPUT TECHNOLOGIES AS DESCRIBED BY CMS-2020-01-R: HCPCS

## 2021-08-29 PROCEDURE — 94660 CPAP INITIATION&MGMT: CPT

## 2021-08-29 PROCEDURE — 94644 CONT INHLJ TX 1ST HOUR: CPT

## 2021-08-29 PROCEDURE — 6370000000 HC RX 637 (ALT 250 FOR IP): Performed by: NURSE PRACTITIONER

## 2021-08-29 PROCEDURE — 2580000003 HC RX 258: Performed by: EMERGENCY MEDICINE

## 2021-08-29 PROCEDURE — 2060000000 HC ICU INTERMEDIATE R&B

## 2021-08-29 PROCEDURE — 83605 ASSAY OF LACTIC ACID: CPT

## 2021-08-29 PROCEDURE — 93005 ELECTROCARDIOGRAM TRACING: CPT | Performed by: EMERGENCY MEDICINE

## 2021-08-29 PROCEDURE — 83880 ASSAY OF NATRIURETIC PEPTIDE: CPT

## 2021-08-29 PROCEDURE — 94761 N-INVAS EAR/PLS OXIMETRY MLT: CPT

## 2021-08-29 PROCEDURE — 87040 BLOOD CULTURE FOR BACTERIA: CPT

## 2021-08-29 PROCEDURE — 6360000002 HC RX W HCPCS: Performed by: EMERGENCY MEDICINE

## 2021-08-29 PROCEDURE — 87635 SARS-COV-2 COVID-19 AMP PRB: CPT

## 2021-08-29 PROCEDURE — U0005 INFEC AGEN DETEC AMPLI PROBE: HCPCS

## 2021-08-29 PROCEDURE — 96365 THER/PROPH/DIAG IV INF INIT: CPT

## 2021-08-29 PROCEDURE — 36415 COLL VENOUS BLD VENIPUNCTURE: CPT

## 2021-08-29 PROCEDURE — 85025 COMPLETE CBC W/AUTO DIFF WBC: CPT

## 2021-08-29 PROCEDURE — 99285 EMERGENCY DEPT VISIT HI MDM: CPT

## 2021-08-29 PROCEDURE — 82803 BLOOD GASES ANY COMBINATION: CPT

## 2021-08-29 RX ORDER — ONDANSETRON 4 MG/1
4 TABLET, ORALLY DISINTEGRATING ORAL EVERY 8 HOURS PRN
Status: DISCONTINUED | OUTPATIENT
Start: 2021-08-29 | End: 2021-09-03 | Stop reason: HOSPADM

## 2021-08-29 RX ORDER — NITROGLYCERIN 0.4 MG/1
0.4 TABLET SUBLINGUAL EVERY 5 MIN PRN
Status: DISCONTINUED | OUTPATIENT
Start: 2021-08-29 | End: 2021-08-29 | Stop reason: SDUPTHER

## 2021-08-29 RX ORDER — DEXTROSE MONOHYDRATE 25 G/50ML
12.5 INJECTION, SOLUTION INTRAVENOUS PRN
Status: DISCONTINUED | OUTPATIENT
Start: 2021-08-29 | End: 2021-09-03 | Stop reason: HOSPADM

## 2021-08-29 RX ORDER — OXYCODONE HYDROCHLORIDE AND ACETAMINOPHEN 5; 325 MG/1; MG/1
1 TABLET ORAL ONCE
Status: COMPLETED | OUTPATIENT
Start: 2021-08-29 | End: 2021-08-29

## 2021-08-29 RX ORDER — POLYETHYLENE GLYCOL 3350 17 G/17G
17 POWDER, FOR SOLUTION ORAL DAILY PRN
Status: DISCONTINUED | OUTPATIENT
Start: 2021-08-29 | End: 2021-09-03 | Stop reason: HOSPADM

## 2021-08-29 RX ORDER — HYDRALAZINE HYDROCHLORIDE 50 MG/1
50 TABLET, FILM COATED ORAL EVERY 8 HOURS SCHEDULED
Status: DISCONTINUED | OUTPATIENT
Start: 2021-08-29 | End: 2021-09-03 | Stop reason: HOSPADM

## 2021-08-29 RX ORDER — SODIUM CHLORIDE 9 MG/ML
25 INJECTION, SOLUTION INTRAVENOUS PRN
Status: DISCONTINUED | OUTPATIENT
Start: 2021-08-29 | End: 2021-08-29 | Stop reason: SDUPTHER

## 2021-08-29 RX ORDER — SODIUM CHLORIDE 9 MG/ML
25 INJECTION, SOLUTION INTRAVENOUS PRN
Status: DISCONTINUED | OUTPATIENT
Start: 2021-08-29 | End: 2021-09-03 | Stop reason: HOSPADM

## 2021-08-29 RX ORDER — GABAPENTIN 100 MG/1
100 CAPSULE ORAL 3 TIMES DAILY
Status: DISCONTINUED | OUTPATIENT
Start: 2021-08-29 | End: 2021-09-03 | Stop reason: HOSPADM

## 2021-08-29 RX ORDER — NICOTINE POLACRILEX 4 MG
15 LOZENGE BUCCAL PRN
Status: DISCONTINUED | OUTPATIENT
Start: 2021-08-29 | End: 2021-09-03 | Stop reason: HOSPADM

## 2021-08-29 RX ORDER — CITALOPRAM 20 MG/1
40 TABLET ORAL DAILY
Status: DISCONTINUED | OUTPATIENT
Start: 2021-08-30 | End: 2021-09-03 | Stop reason: HOSPADM

## 2021-08-29 RX ORDER — HYDROXYZINE PAMOATE 25 MG/1
50 CAPSULE ORAL NIGHTLY
Status: DISCONTINUED | OUTPATIENT
Start: 2021-08-29 | End: 2021-09-03 | Stop reason: HOSPADM

## 2021-08-29 RX ORDER — LOSARTAN POTASSIUM 25 MG/1
50 TABLET ORAL DAILY
Status: DISCONTINUED | OUTPATIENT
Start: 2021-08-30 | End: 2021-09-03 | Stop reason: HOSPADM

## 2021-08-29 RX ORDER — IPRATROPIUM BROMIDE AND ALBUTEROL SULFATE 2.5; .5 MG/3ML; MG/3ML
3 SOLUTION RESPIRATORY (INHALATION) ONCE
Status: COMPLETED | OUTPATIENT
Start: 2021-08-29 | End: 2021-08-29

## 2021-08-29 RX ORDER — DILTIAZEM HYDROCHLORIDE 180 MG/1
180 CAPSULE, COATED, EXTENDED RELEASE ORAL DAILY
Status: DISCONTINUED | OUTPATIENT
Start: 2021-08-30 | End: 2021-09-03 | Stop reason: HOSPADM

## 2021-08-29 RX ORDER — ASPIRIN 81 MG/1
81 TABLET, CHEWABLE ORAL DAILY
Status: DISCONTINUED | OUTPATIENT
Start: 2021-08-30 | End: 2021-09-03 | Stop reason: HOSPADM

## 2021-08-29 RX ORDER — ALBUTEROL SULFATE 90 UG/1
2 AEROSOL, METERED RESPIRATORY (INHALATION) EVERY 6 HOURS PRN
Status: DISCONTINUED | OUTPATIENT
Start: 2021-08-29 | End: 2021-09-03 | Stop reason: HOSPADM

## 2021-08-29 RX ORDER — METHYLPREDNISOLONE SODIUM SUCCINATE 125 MG/2ML
125 INJECTION, POWDER, LYOPHILIZED, FOR SOLUTION INTRAMUSCULAR; INTRAVENOUS ONCE
Status: COMPLETED | OUTPATIENT
Start: 2021-08-29 | End: 2021-08-29

## 2021-08-29 RX ORDER — SODIUM CHLORIDE 0.9 % (FLUSH) 0.9 %
5-40 SYRINGE (ML) INJECTION EVERY 12 HOURS SCHEDULED
Status: DISCONTINUED | OUTPATIENT
Start: 2021-08-29 | End: 2021-09-03 | Stop reason: HOSPADM

## 2021-08-29 RX ORDER — PRAVASTATIN SODIUM 40 MG
40 TABLET ORAL NIGHTLY
Status: DISCONTINUED | OUTPATIENT
Start: 2021-08-29 | End: 2021-09-03 | Stop reason: HOSPADM

## 2021-08-29 RX ORDER — OXYCODONE AND ACETAMINOPHEN 7.5; 325 MG/1; MG/1
1 TABLET ORAL EVERY 6 HOURS PRN
Status: COMPLETED | OUTPATIENT
Start: 2021-08-29 | End: 2021-08-30

## 2021-08-29 RX ORDER — ACETAMINOPHEN 650 MG/1
650 SUPPOSITORY RECTAL EVERY 6 HOURS PRN
Status: DISCONTINUED | OUTPATIENT
Start: 2021-08-29 | End: 2021-09-03 | Stop reason: HOSPADM

## 2021-08-29 RX ORDER — TORSEMIDE 100 MG/1
100 TABLET ORAL DAILY
Status: DISCONTINUED | OUTPATIENT
Start: 2021-08-30 | End: 2021-09-03 | Stop reason: HOSPADM

## 2021-08-29 RX ORDER — SODIUM CHLORIDE 0.9 % (FLUSH) 0.9 %
5-40 SYRINGE (ML) INJECTION PRN
Status: DISCONTINUED | OUTPATIENT
Start: 2021-08-29 | End: 2021-09-03 | Stop reason: HOSPADM

## 2021-08-29 RX ORDER — HEPARIN SODIUM 5000 [USP'U]/ML
5000 INJECTION, SOLUTION INTRAVENOUS; SUBCUTANEOUS EVERY 8 HOURS SCHEDULED
Status: DISCONTINUED | OUTPATIENT
Start: 2021-08-29 | End: 2021-09-03 | Stop reason: HOSPADM

## 2021-08-29 RX ORDER — ACETAMINOPHEN 325 MG/1
650 TABLET ORAL EVERY 6 HOURS PRN
Status: DISCONTINUED | OUTPATIENT
Start: 2021-08-29 | End: 2021-09-03 | Stop reason: HOSPADM

## 2021-08-29 RX ORDER — SODIUM CHLORIDE 0.9 % (FLUSH) 0.9 %
5-40 SYRINGE (ML) INJECTION PRN
Status: DISCONTINUED | OUTPATIENT
Start: 2021-08-29 | End: 2021-08-29 | Stop reason: SDUPTHER

## 2021-08-29 RX ORDER — IPRATROPIUM BROMIDE AND ALBUTEROL SULFATE 2.5; .5 MG/3ML; MG/3ML
1 SOLUTION RESPIRATORY (INHALATION) EVERY 6 HOURS PRN
Status: DISCONTINUED | OUTPATIENT
Start: 2021-08-29 | End: 2021-09-03 | Stop reason: HOSPADM

## 2021-08-29 RX ORDER — ISOSORBIDE MONONITRATE 30 MG/1
30 TABLET, EXTENDED RELEASE ORAL DAILY
Status: DISCONTINUED | OUTPATIENT
Start: 2021-08-30 | End: 2021-09-03 | Stop reason: HOSPADM

## 2021-08-29 RX ORDER — SODIUM CHLORIDE 0.9 % (FLUSH) 0.9 %
5-40 SYRINGE (ML) INJECTION EVERY 12 HOURS SCHEDULED
Status: DISCONTINUED | OUTPATIENT
Start: 2021-08-29 | End: 2021-08-29 | Stop reason: SDUPTHER

## 2021-08-29 RX ORDER — FENTANYL CITRATE 50 UG/ML
50 INJECTION, SOLUTION INTRAMUSCULAR; INTRAVENOUS ONCE
Status: COMPLETED | OUTPATIENT
Start: 2021-08-29 | End: 2021-08-29

## 2021-08-29 RX ORDER — DEXTROSE MONOHYDRATE 50 MG/ML
100 INJECTION, SOLUTION INTRAVENOUS PRN
Status: DISCONTINUED | OUTPATIENT
Start: 2021-08-29 | End: 2021-09-03 | Stop reason: HOSPADM

## 2021-08-29 RX ORDER — TRAZODONE HYDROCHLORIDE 50 MG/1
100 TABLET ORAL NIGHTLY
Status: DISCONTINUED | OUTPATIENT
Start: 2021-08-29 | End: 2021-09-03 | Stop reason: HOSPADM

## 2021-08-29 RX ORDER — ONDANSETRON 2 MG/ML
4 INJECTION INTRAMUSCULAR; INTRAVENOUS EVERY 6 HOURS PRN
Status: DISCONTINUED | OUTPATIENT
Start: 2021-08-29 | End: 2021-09-03 | Stop reason: HOSPADM

## 2021-08-29 RX ORDER — ALBUTEROL SULFATE 2.5 MG/3ML
2.5 SOLUTION RESPIRATORY (INHALATION) EVERY 4 HOURS PRN
Status: DISCONTINUED | OUTPATIENT
Start: 2021-08-29 | End: 2021-09-03 | Stop reason: HOSPADM

## 2021-08-29 RX ORDER — NITROGLYCERIN 0.4 MG/1
0.4 TABLET SUBLINGUAL EVERY 5 MIN PRN
Status: DISCONTINUED | OUTPATIENT
Start: 2021-08-29 | End: 2021-09-03 | Stop reason: HOSPADM

## 2021-08-29 RX ADMIN — Medication 1500 MG: at 22:30

## 2021-08-29 RX ADMIN — INSULIN HUMAN 10 UNITS: 100 INJECTION, SOLUTION PARENTERAL at 22:22

## 2021-08-29 RX ADMIN — FENTANYL CITRATE 50 MCG: 0.05 INJECTION, SOLUTION INTRAMUSCULAR; INTRAVENOUS at 20:10

## 2021-08-29 RX ADMIN — OXYCODONE HYDROCHLORIDE AND ACETAMINOPHEN 1 TABLET: 5; 325 TABLET ORAL at 22:16

## 2021-08-29 RX ADMIN — IPRATROPIUM BROMIDE AND ALBUTEROL SULFATE 3 AMPULE: .5; 3 SOLUTION RESPIRATORY (INHALATION) at 18:44

## 2021-08-29 RX ADMIN — METHYLPREDNISOLONE SODIUM SUCCINATE 125 MG: 125 INJECTION, POWDER, FOR SOLUTION INTRAMUSCULAR; INTRAVENOUS at 18:40

## 2021-08-29 RX ADMIN — NITROGLYCERIN 0.4 MG: 0.4 TABLET, ORALLY DISINTEGRATING SUBLINGUAL at 18:41

## 2021-08-29 RX ADMIN — CEFEPIME HYDROCHLORIDE 2000 MG: 2 INJECTION, POWDER, FOR SOLUTION INTRAVENOUS at 20:15

## 2021-08-29 ASSESSMENT — PAIN SCALES - GENERAL
PAINLEVEL_OUTOF10: 5
PAINLEVEL_OUTOF10: 5
PAINLEVEL_OUTOF10: 9

## 2021-08-29 ASSESSMENT — PAIN DESCRIPTION - LOCATION: LOCATION: BACK;HIP

## 2021-08-29 ASSESSMENT — PAIN DESCRIPTION - PAIN TYPE: TYPE: CHRONIC PAIN

## 2021-08-29 NOTE — ED NOTES
Bed: 02  Expected date:   Expected time:   Means of arrival:   Comments:  Juliet Yost RN  08/29/21 Toyin Triana

## 2021-08-29 NOTE — PROGRESS NOTES
08/29/21 1929   NIV Type   Skin Assessment Clean, dry, & intact   Skin Protection for O2 Device Yes   Equipment Type v60   Mode Bilevel   Mask Type Full face mask   Mask Size Large   Settings/Measurements   CPAP/EPAP 14 cmH2O   Resp 22   FiO2  60 %  (decreased to 50%)   Vt Exhaled 656 mL   Minute Volume 14.4 Liters   Mask Leak (lpm) 44 lpm   Comfort Level Good   Using Accessory Muscles Yes   SpO2 100   Alarm Settings   Alarms On Y   Press Low Alarm 6 cmH2O   High Pressure Alarm 30 cmH2O   Delay Alarm 20 sec(s)   Resp Rate Low Alarm 6   High Respiratory Rate 50 br/min

## 2021-08-29 NOTE — ED PROVIDER NOTES
[facility]  ED PROVIDER NOTE    Patient Identification  Pt Name: Rosanna Thrasher  MRN: [id number]  Clemente 1970  Date of evaluation: 8/29/2021  Provider: Nilsa Trevino MD  PCP: Alea Aguilar MD    Chief Complaint  Shortness of Breath ( Dialysis pt M, W, F. Last treatment on Wednesday. Pt reports SOB that started today. Pt reports taking 3 treatments today prior to EMS arrival. EMS placed pt on CPAP and O2 was 80's. pt was given 0.5mg IM epi by squad. pt has increased WOB, retractions, speaks 1-2 words. Placed on BiPAP on arrival. )      HPI  History provided by patient. Limited by severe respiratory distress  This is a 46 y.o. male who presents to the ED for shortness of breath. Tight chest pain. Started today. Thinks may be due to COPD or from missing dialysis on Friday. Last dialysis was on Thursday. No fevers. Has some cough. Was found to be hypoxic to the 80s per EMS. Brought in on CPAP by EMS. .  Symptoms are severe. Worse with exertion        ROS  10 systems reviewed, pertinent positives/negatives per HPI otherwise noted to be negative.     I have reviewed the following nursing documentation:  Allergies: Morphine    Past medical history:   Past Medical History:   Diagnosis Date    Ambulatory dysfunction     walker for long distances, SOB with distance    Aortic stenosis     echo 2017    Arthritis     hands and hips    Asthma     Bilateral hilar adenopathy syndrome 6/3/2013    CAD (coronary artery disease)     Dr. [facility]) 04/19/2019    EF= 43%    CHF (congestive heart failure) (HCC)     Chronic pain     COPD (chronic obstructive pulmonary disease) ([address]     pulmonology Dr. Loyola Listen    Depression     Diabetes mellitus ([address]     borderline    Difficult intravenous access     Emphysema of lung ([address]     ESRD (end stage renal disease) on dialysis ([address]     MWF    Fear of needles     Gastric ulcer     GERD (gastroesophageal reflux disease)     Heart valve problem     bicuspic valve    [facility]od[facility]     History of spinal fracture     work incident    Hx of blood clots     Bilateral lower extremities; stents in place    Hyperlipidemia     Hypertension     MI (myocardial infarction) ([facility] 2019    has had 9 MIs. 2019 was the last    Neuromuscular disorder ([facility]     due to CVA    Numbness and tingling in left arm     from fistula    Pneumonia     PONV (postoperative nausea and vomiting)     Prolonged emergence from general anesthesia     States requires more medication than most people    Sleep apnea     Uses CPAP    Stroke ([facility]     7mm thalamic cva 2017 deficts left side, left side weakness    TIA (transient ischemic attack)     Unspecified diseases of blood and blood-forming organs      Past surgical history:   Past Surgical History:   Procedure Laterality Date    AORTIC VALVE REPLACEMENT N/A 10/15/2019    TRANSCATHETER AORTIC VALVE REPLACEMENT FEMORAL APPROACH performed by An Johns MD at [address] [place] [address] Right 7/2/2019    PERITONEAL DIALYSIS CATHETER REMOVAL performed by Laura Encinas MD at [facility]  2/29/2015    WNL    CORONARY ANGIOPLASTY WITH STENT PLACEMENT  05/26/15    CYST REMOVAL  08/14/2013    EXCISION CYSTS, NECK X2 AND ABDOMINAL benign    DIAGNOSTIC CARDIAC CATH LAB PROCEDURE      DIALYSIS FISTULA CREATION Left 10/30/2017    LEFT BRACHIAL CEPHALIC FISTULA    DIALYSIS FISTULA CREATION Left 3/27/2019    LIGATION  AV FISTULA performed by Reshma Villar MD at [facility], [address] [place] [address]      OTHER SURGICAL HISTORY  02/01/2017    laparoscopic cholecystectomy with intraoperative cholangiogram    OTHER SURGICAL HISTORY  2018    PORT PLACEMENT  - vas cath    OTHER SURGICAL HISTORY Bilateral 06/26/2018    laprascopic peritoneal dialysis catheter placement    OTHER SURGICAL HISTORY Right 09/2018    peritoneal dialysis port placed on right side of abdomen    OTHER SURGICAL HISTORY  05/28/2019    PTA/Stenting R External Iliac artery    MT LAP INSERTION TUNNELED INTRAPERITONEAL CATHETER N/A 9/21/2018    LAPAROSCOPIC PERITONEAL DIALYSIS CATHETER REPLACEMENT performed by Laura Encinas MD at [facility]  01/06/2016    UPPER GASTROINTESTINAL ENDOSCOPY  01/29/2017    possible candida, otherwise normal appearing    VASCULAR SURGERY  aprx 2 years ago    2 stents placed, each side of groin       Home medications:   Previous Medications    ALBUTEROL (PROVENTIL) (2.5 MG/3ML) 0.083% NEBULIZER SOLUTION    INHALE 1 VIAL VIA NEBULIZER EVERY 6 HOURS AS NEEDED FOR WHEEZING    ALBUTEROL SULFATE  (90 BASE) MCG/ACT INHALER    Inhale 2 puffs into the lungs every 6 hours as needed for Wheezing    ALCOHOL SWABS PADS    USE AS DIRECTED    ASPIRIN 81 MG CHEWABLE TABLET    Take 1 tablet by mouth daily. B COMPLEX-C-FOLIC ACID (VIRT-CAPS) 1 MG CAPS    TK ONE C PO  QD    BASAGLAR KWIKPEN 100 UNIT/ML INJECTION PEN    ADMINISTER 60 UNITS UNDER THE SKIN EVERY NIGHT    BLOOD GLUCOSE MONITORING SUPPL ADAM    USE AS DIRECTED.    BLOOD GLUCOSE TEST STRIPS (FREESTYLE LITE) STRIP    Daily As needed. BLOOD GLUCOSE TEST STRIPS (GLUCOSE METER TEST) STRIP    1 each by In Vitro route 5 times daily As needed. CALCIUM ACETATE, PHOS BINDER, 667 MG CAPS    TAKE 1 CAPSULE BY MOUTH THREE TIMES DAILY WITH MEALS    CALCIUM CARBONATE (TUMS) 500 MG CHEWABLE TABLET    Take 1 tablet by mouth 3 times daily as needed for Heartburn.     CITALOPRAM (CELEXA) 40 MG TABLET    TAKE (1) TABLET BY MOUTH DAILY    CYCLOBENZAPRINE (FLEXERIL) 10 MG TABLET    TAKE 1 TABLET BY MOUTH EVERY 8 HOURS AS NEEDED    DILTIAZEM (CARDIZEM CD) 180 MG EXTENDED RELEASE CAPSULE        DULOXETINE (CYMBALTA) 30 MG EXTENDED RELEASE CAPSULE    TAKE 1 CAPSULE BY MOUTH EVERY DAY    FAMOTIDINE (PEPCID) 20 MG TABLET    TAKE 1 TABLET BY MOUTH TWICE DAILY AS NEEDED FOR HEARTBURN    FLUNISOLIDE (NASALIDE) 25 MCG/ACT (0.025%) SOLN    Inhale 2 sprays into the lungs every 12 hours    GABAPENTIN (NEURONTIN) 100 MG CAPSULE    TAKE 1 TO 2 CAPSULES BY MOUTH THREE TIMES A DAY    GLUCOSE BLOOD (BLOOD GLUCOSE TEST STRIPS) STRP    TEST 3-4 TIMES DAILY, AS DIRECTED    GLUCOSE MONITORING KIT (FREESTYLE) MONITORING KIT    1 kit by Does not apply route daily    GLUCOSE MONITORING KIT (FREESTYLE) MONITORING KIT    1 kit by Does not apply route daily    HYDRALAZINE (APRESOLINE) 50 MG TABLET    TAKE 1/2 TABLET BY MOUTH EVERY 8 HOURS    HYDROCORTISONE (ANUSOL-HC) 2.5 % CREA RECTAL CREAM    Place rectally 2 times daily    HYDROXYZINE (VISTARIL) 50 MG CAPSULE    TAKE 1 TO 2 CAPSULES BY MOUTH NIGHTLY    INSULIN ASPART (NOVOLOG FLEXPEN) 100 UNIT/ML INJECTION PEN    Inject 20 Units into the skin 3 times daily (before meals)    IPRATROPIUM-ALBUTEROL (DUONEB) 0.5-2.5 (3) MG/3ML SOLN NEBULIZER SOLUTION    Inhale 3 mLs into the lungs every 6 hours as needed for Shortness of Breath    ISOSORBIDE MONONITRATE (IMDUR) 30 MG EXTENDED RELEASE TABLET    TAKE 1 TABLET BY MOUTH EVERY DAY    LINZESS 145 MCG CAPSULE    TAKE 1 CAPSULE BY MOUTH EVERY MORNING BEFORE BREAKFAST    LOSARTAN (COZAAR) 50 MG TABLET    TAKE 1 TABLET BY MOUTH DAILY    NITROGLYCERIN (NITROSTAT) 0.4 MG SL TABLET    DISSOLVE 1 TABLET UNDER THE TONGUE AS NEEDED FOR CHEST PAIN EVERY 5 MINUTES UP TO 3 TIMES. IF NO RELIEF CALL 911. NYSTATIN (MYCOSTATIN) 548195 UNIT/GM CREAM    Apply topically 2 times daily. ONDANSETRON (ZOFRAN ODT) 4 MG DISINTEGRATING TABLET    Take 1 tablet by mouth every 8 hours as needed for Nausea    OXYCODONE-ACETAMINOPHEN (PERCOCET) 7.5-325 MG PER TABLET    Take 1 tablet by mouth every 4-6 hours as needed for Pain for up to 30 days.     PANTOPRAZOLE (PROTONIX) 40 MG TABLET    TAKE (1) TABLET BY MOUTH EACH MORNING BEFORE BREAKFAST    POLYETHYLENE GLYCOL 3350 GRAN        PRAVASTATIN (PRAVACHOL) 40 MG TABLET    TAKE (1) TABLET BY MOUTH IN THE EVENING    PREDNISONE (DELTASONE) 20 MG TABLET    Take 3 tablets daily for 3 days, 2 tablets daily for 3 days and 1 tablet daily for 3 days. QUETIAPINE (SEROQUEL) 50 MG TABLET    TAKE (1) TABLET BY MOUTH IN THE EVENING    SIMETHICONE (MYLICON) 80 MG CHEWABLE TABLET    Take 1 tablet by mouth every 6 hours as needed (cramping)    TIOTROPIUM BROMIDE-OLODATEROL (STIOLTO RESPIMAT) 2.5-2.5 MCG/ACT AERS    Inhale 2 puffs into the lungs daily    TIZANIDINE (ZANAFLEX) 4 MG TABLET    TAKE 1 TABLET BY MOUTH THREE TIMES DAILY    TORSEMIDE (DEMADEX) 100 MG TABLET    TAKE 1 TO 2 TABLETS BY MOUTH DAILY    TRAZODONE (DESYREL) 150 MG TABLET    TAKE (1) TABLET BY MOUTH NIGHTLY    VITAMIN D (ERGOCALCIFEROL) 26427 UNITS CAPS CAPSULE    TK 1 C PO WEEKLY       Social history:  reports that he has been smoking cigarettes. He has a 16.50 pack-year smoking history. He has never used smokeless tobacco. He reports previous alcohol use. He reports that he does not use drugs. Family history:    Family History   Problem Relation Age of Onset    Diabetes Mother     Heart Disease Father     Kidney Disease Sister         stage 4-kidney failure    Cancer Sister     Heart Disease Sister     Obesity Sister     Cancer Sister     Heart Disease Sister     Obesity Sister     Alcohol Abuse Brother          Exam  ED Triage Vitals [08/29/21 1825]   BP Temp Temp src Pulse Resp SpO2 Height Weight   -- -- -- 140 (!) 31 93 % -- --     Nursing note and vitals reviewed. Constitutional: Ill-appearing  HENT:      Head: Normocephalic      Ears: External ears normal.      Nose: Nose normal.     Mouth: Membrane mucosa dry  Eyes: No discharge. Neck: Supple. Trachea midline. Cardiovascular: Regular rate. Warm extremities  Pulmonary/Chest: Using accessory muscles to breathe. Poor air movement bilaterally. Distant breath sounds  Abdominal: Soft. No distension.  Nontender  : Deferred  Rectal: Deferred 20 mg/dL    CREATININE 7.4 (HH) 0.9 - 1.3 mg/dL    GFR Non-African American 8 (A) >60    GFR  9 (A) >60    Calcium 8.7 8.3 - 10.6 mg/dL    Total Protein 7.5 6.4 - 8.2 g/dL    Albumin 3.7 3.4 - 5.0 g/dL    Albumin/Globulin Ratio 1.0 (L) 1.1 - 2.2    Total Bilirubin <0.2 0.0 - 1.0 mg/dL    Alkaline Phosphatase 92 40 - 129 U/L    ALT 17 10 - 40 U/L    AST 13 (L) 15 - 37 U/L    Globulin 3.8 g/dL   Troponin   Result Value Ref Range    Troponin 0.13 (H) <0.01 ng/mL   Brain Natriuretic Peptide   Result Value Ref Range    Pro-BNP 38,040 (H) 0 - 124 pg/mL   Blood gas, venous   Result Value Ref Range    pH, Blade 7.164 (LL) 7.350 - 7.450    pCO2, Blade 62.4 (H) 40.0 - 50.0 mmHg    pO2, Blade 92.1 (H) 25 - 40 mmHg    HCO3, Venous 21.9 (L) 23.0 - 29.0 mmol/L    Base Excess, Blade -7.6 (L) -3.0 - 3.0 mmol/L    O2 Sat, Blade 95 Not Established %    Carboxyhemoglobin 5.9 (H) 0.0 - 1.5 %    MetHgb, Blade 0.5 <1.5 %    TC02 (Calc), Blade 24 Not Established mmol/L    O2 Therapy Unknown        Screenings   Robert Coma Scale  Eye Opening: Spontaneous  Best Verbal Response: Oriented  Best Motor Response: Obeys commands  Coulters Coma Scale Score: 15       MDM and ED Course  This is a 46 y.o. male who presents to the ED for shortness of breath. Started today. May be secondary to fluid overload since he missed dialysis. Will obtain chest x-ray. CHF also in differential.  He is significantly hypertensive to 057 systolic. Will give nitroglycerin. May be due to COPD exacerbation. Giving duo nebs and steroids. Will check for sources of infection. He is on CPAP. ACS and pulmonary embolism also on differential.    -------------    Patient had no improvement with nitroglycerin. After 3 stacked duo nebs, he had significant improvement. He is now speaking in near full sentences. His blood gas did show that he was retaining CO2. Lower suspicion for CHF exacerbation because of this.   Do not believe that he requires emergent dialysis since he has only missed 1 day. No acute ischemic changes on EKG. Troponin is elevated but is below his baseline and may be secondary to history of end-stage renal disease. Creatinine elevated likely due to the same. Chest x-ray shows possible pneumonia as well. In the setting of lactic acidosis, leukocytosis, and severe respiratory distress initially, broad-spectrum antibiotics were ordered. Started on steroids for COPD. Will admit to hospitalist service    The total critical care time spent while evaluating and treating this patient was at least 32 minutes. This excludes time spent doing separately billable procedures. This includes time at the bedside, data interpretation, medication management, obtaining critical history from collateral sources if the patient is unable to provide it directly, and physician consultation. Specifics of interventions taken and potentially life-threatening diagnostic considerations are listed above in the medical decision making. [unfilled]    Final Impression  1. COPD exacerbation ([address]    2. Dyspnea and respiratory abnormalities    3. Hypoxia    4. Chest pain, unspecified type        Blood pressure (!) 160/93, pulse 109, temperature 97.5 °F (36.4 °C), temperature source Axillary, resp. rate 28, SpO2 100 %. Disposition:  DISPOSITION        Patient Referrals:  No follow-up provider specified. Discharge Medications:  New Prescriptions    No medications on file       Discontinued Medications:  Discontinued Medications    No medications on file       This chart was generated using the [employer] dictation system. I created this record but it may contain dictation errors given the limitations of this technology.         Nilam Layne MD  08/29/21 2053

## 2021-08-29 NOTE — ED NOTES
Second set of blood cultures obtained from right hand unable to use left arm d/t previous fistula      Anisa Seth RN  08/29/21 1914

## 2021-08-30 ENCOUNTER — TELEPHONE (OUTPATIENT)
Dept: FAMILY MEDICINE CLINIC | Age: 51
End: 2021-08-30

## 2021-08-30 LAB
ANION GAP SERPL CALCULATED.3IONS-SCNC: 17 MMOL/L (ref 3–16)
BASOPHILS ABSOLUTE: 0 K/UL (ref 0–0.2)
BASOPHILS RELATIVE PERCENT: 0.2 %
BUN BLDV-MCNC: 79 MG/DL (ref 7–20)
CALCIUM SERPL-MCNC: 8.6 MG/DL (ref 8.3–10.6)
CHLORIDE BLD-SCNC: 92 MMOL/L (ref 99–110)
CHOLESTEROL, TOTAL: 214 MG/DL (ref 0–199)
CO2: 20 MMOL/L (ref 21–32)
CREAT SERPL-MCNC: 8.3 MG/DL (ref 0.9–1.3)
EKG ATRIAL RATE: 123 BPM
EKG DIAGNOSIS: NORMAL
EKG P AXIS: 81 DEGREES
EKG P-R INTERVAL: 164 MS
EKG Q-T INTERVAL: 318 MS
EKG QRS DURATION: 86 MS
EKG QTC CALCULATION (BAZETT): 455 MS
EKG R AXIS: 221 DEGREES
EKG T AXIS: 71 DEGREES
EKG VENTRICULAR RATE: 123 BPM
EOSINOPHILS ABSOLUTE: 0 K/UL (ref 0–0.6)
EOSINOPHILS RELATIVE PERCENT: 0 %
GFR AFRICAN AMERICAN: 8
GFR NON-AFRICAN AMERICAN: 7
GLUCOSE BLD-MCNC: 298 MG/DL (ref 70–99)
GLUCOSE BLD-MCNC: 392 MG/DL (ref 70–99)
GLUCOSE BLD-MCNC: 403 MG/DL (ref 70–99)
GLUCOSE BLD-MCNC: 490 MG/DL (ref 70–99)
GLUCOSE BLD-MCNC: 537 MG/DL (ref 70–99)
GLUCOSE BLD-MCNC: 628 MG/DL (ref 70–99)
HCT VFR BLD CALC: 28.9 % (ref 40.5–52.5)
HDLC SERPL-MCNC: 55 MG/DL (ref 40–60)
HEMOGLOBIN: 9.3 G/DL (ref 13.5–17.5)
LDL CHOLESTEROL CALCULATED: 141 MG/DL
LYMPHOCYTES ABSOLUTE: 0.3 K/UL (ref 1–5.1)
LYMPHOCYTES RELATIVE PERCENT: 2.1 %
MAGNESIUM: 2 MG/DL (ref 1.8–2.4)
MCH RBC QN AUTO: 30.6 PG (ref 26–34)
MCHC RBC AUTO-ENTMCNC: 32 G/DL (ref 31–36)
MCV RBC AUTO: 95.6 FL (ref 80–100)
MONOCYTES ABSOLUTE: 0.2 K/UL (ref 0–1.3)
MONOCYTES RELATIVE PERCENT: 1.3 %
NEUTROPHILS ABSOLUTE: 12 K/UL (ref 1.7–7.7)
NEUTROPHILS RELATIVE PERCENT: 96.4 %
PDW BLD-RTO: 16.4 % (ref 12.4–15.4)
PERFORMED ON: ABNORMAL
PLATELET # BLD: 257 K/UL (ref 135–450)
PMV BLD AUTO: 8.5 FL (ref 5–10.5)
POTASSIUM SERPL-SCNC: 5.1 MMOL/L (ref 3.5–5.1)
RBC # BLD: 3.02 M/UL (ref 4.2–5.9)
SARS-COV-2: NOT DETECTED
SODIUM BLD-SCNC: 129 MMOL/L (ref 136–145)
TRIGL SERPL-MCNC: 88 MG/DL (ref 0–150)
TROPONIN: 0.15 NG/ML
TROPONIN: 0.15 NG/ML
VANCOMYCIN RANDOM: 23.8 UG/ML
VLDLC SERPL CALC-MCNC: 18 MG/DL
WBC # BLD: 12.4 K/UL (ref 4–11)

## 2021-08-30 PROCEDURE — 6360000002 HC RX W HCPCS: Performed by: NURSE PRACTITIONER

## 2021-08-30 PROCEDURE — 2060000000 HC ICU INTERMEDIATE R&B

## 2021-08-30 PROCEDURE — 84484 ASSAY OF TROPONIN QUANT: CPT

## 2021-08-30 PROCEDURE — 36415 COLL VENOUS BLD VENIPUNCTURE: CPT

## 2021-08-30 PROCEDURE — 2580000003 HC RX 258: Performed by: NURSE PRACTITIONER

## 2021-08-30 PROCEDURE — 2580000003 HC RX 258: Performed by: EMERGENCY MEDICINE

## 2021-08-30 PROCEDURE — 85025 COMPLETE CBC W/AUTO DIFF WBC: CPT

## 2021-08-30 PROCEDURE — 94660 CPAP INITIATION&MGMT: CPT

## 2021-08-30 PROCEDURE — 83735 ASSAY OF MAGNESIUM: CPT

## 2021-08-30 PROCEDURE — 6370000000 HC RX 637 (ALT 250 FOR IP): Performed by: NURSE PRACTITIONER

## 2021-08-30 PROCEDURE — 5A1D70Z PERFORMANCE OF URINARY FILTRATION, INTERMITTENT, LESS THAN 6 HOURS PER DAY: ICD-10-PCS | Performed by: INTERNAL MEDICINE

## 2021-08-30 PROCEDURE — 2700000000 HC OXYGEN THERAPY PER DAY

## 2021-08-30 PROCEDURE — 6360000002 HC RX W HCPCS: Performed by: INTERNAL MEDICINE

## 2021-08-30 PROCEDURE — 94761 N-INVAS EAR/PLS OXIMETRY MLT: CPT

## 2021-08-30 PROCEDURE — 93010 ELECTROCARDIOGRAM REPORT: CPT | Performed by: INTERNAL MEDICINE

## 2021-08-30 PROCEDURE — 80061 LIPID PANEL: CPT

## 2021-08-30 PROCEDURE — 80202 ASSAY OF VANCOMYCIN: CPT

## 2021-08-30 PROCEDURE — 80048 BASIC METABOLIC PNL TOTAL CA: CPT

## 2021-08-30 PROCEDURE — 2500000003 HC RX 250 WO HCPCS: Performed by: INTERNAL MEDICINE

## 2021-08-30 RX ORDER — CALCIUM ACETATE 667 MG/1
667 CAPSULE ORAL
Status: DISCONTINUED | OUTPATIENT
Start: 2021-08-30 | End: 2021-09-03 | Stop reason: HOSPADM

## 2021-08-30 RX ORDER — DOXERCALCIFEROL 2 UG/ML
3 INJECTION, SOLUTION INTRAVENOUS
Status: DISCONTINUED | OUTPATIENT
Start: 2021-08-30 | End: 2021-09-03 | Stop reason: HOSPADM

## 2021-08-30 RX ORDER — HEPARIN SODIUM 1000 [USP'U]/ML
4000 INJECTION, SOLUTION INTRAVENOUS; SUBCUTANEOUS PRN
Status: DISCONTINUED | OUTPATIENT
Start: 2021-08-30 | End: 2021-09-03 | Stop reason: HOSPADM

## 2021-08-30 RX ORDER — QUETIAPINE FUMARATE 25 MG/1
50 TABLET, FILM COATED ORAL NIGHTLY
Status: DISCONTINUED | OUTPATIENT
Start: 2021-08-30 | End: 2021-09-03 | Stop reason: HOSPADM

## 2021-08-30 RX ADMIN — LOSARTAN POTASSIUM 50 MG: 25 TABLET, FILM COATED ORAL at 14:26

## 2021-08-30 RX ADMIN — HYDROXYZINE PAMOATE 50 MG: 25 CAPSULE ORAL at 20:34

## 2021-08-30 RX ADMIN — EPOETIN ALFA-EPBX 1600 UNITS: 2000 INJECTION, SOLUTION INTRAVENOUS; SUBCUTANEOUS at 12:47

## 2021-08-30 RX ADMIN — INSULIN LISPRO 9 UNITS: 100 INJECTION, SOLUTION INTRAVENOUS; SUBCUTANEOUS at 00:51

## 2021-08-30 RX ADMIN — HYDROXYZINE PAMOATE 50 MG: 25 CAPSULE ORAL at 00:38

## 2021-08-30 RX ADMIN — ASPIRIN 81 MG: 81 TABLET, CHEWABLE ORAL at 14:26

## 2021-08-30 RX ADMIN — TRAZODONE HYDROCHLORIDE 100 MG: 50 TABLET ORAL at 20:35

## 2021-08-30 RX ADMIN — SODIUM CHLORIDE, PRESERVATIVE FREE 10 ML: 5 INJECTION INTRAVENOUS at 14:28

## 2021-08-30 RX ADMIN — INSULIN LISPRO 18 UNITS: 100 INJECTION, SOLUTION INTRAVENOUS; SUBCUTANEOUS at 08:14

## 2021-08-30 RX ADMIN — DOXERCALCIFEROL 3 MCG: 4 INJECTION, SOLUTION INTRAVENOUS at 12:46

## 2021-08-30 RX ADMIN — INSULIN LISPRO 15 UNITS: 100 INJECTION, SOLUTION INTRAVENOUS; SUBCUTANEOUS at 18:06

## 2021-08-30 RX ADMIN — GABAPENTIN 100 MG: 100 CAPSULE ORAL at 20:34

## 2021-08-30 RX ADMIN — HYDRALAZINE HYDROCHLORIDE 50 MG: 50 TABLET, FILM COATED ORAL at 20:34

## 2021-08-30 RX ADMIN — SODIUM CHLORIDE, PRESERVATIVE FREE 10 ML: 5 INJECTION INTRAVENOUS at 00:51

## 2021-08-30 RX ADMIN — PRAVASTATIN SODIUM 40 MG: 40 TABLET ORAL at 00:38

## 2021-08-30 RX ADMIN — HEPARIN SODIUM 5000 UNITS: 5000 INJECTION INTRAVENOUS; SUBCUTANEOUS at 06:35

## 2021-08-30 RX ADMIN — TRAZODONE HYDROCHLORIDE 100 MG: 50 TABLET ORAL at 00:38

## 2021-08-30 RX ADMIN — TORSEMIDE 100 MG: 100 TABLET ORAL at 14:26

## 2021-08-30 RX ADMIN — INSULIN HUMAN 10 UNITS: 100 INJECTION, SOLUTION PARENTERAL at 01:53

## 2021-08-30 RX ADMIN — OXYCODONE AND ACETAMINOPHEN 1 TABLET: 7.5; 325 TABLET ORAL at 20:34

## 2021-08-30 RX ADMIN — OXYCODONE AND ACETAMINOPHEN 1 TABLET: 7.5; 325 TABLET ORAL at 14:26

## 2021-08-30 RX ADMIN — QUETIAPINE FUMARATE 50 MG: 25 TABLET ORAL at 20:34

## 2021-08-30 RX ADMIN — GABAPENTIN 100 MG: 100 CAPSULE ORAL at 00:38

## 2021-08-30 RX ADMIN — HYDRALAZINE HYDROCHLORIDE 50 MG: 50 TABLET, FILM COATED ORAL at 06:35

## 2021-08-30 RX ADMIN — HEPARIN SODIUM 5000 UNITS: 5000 INJECTION INTRAVENOUS; SUBCUTANEOUS at 20:35

## 2021-08-30 RX ADMIN — CALCIUM ACETATE 667 MG: 667 CAPSULE ORAL at 14:26

## 2021-08-30 RX ADMIN — CEFEPIME HYDROCHLORIDE 1000 MG: 1 INJECTION, POWDER, FOR SOLUTION INTRAMUSCULAR; INTRAVENOUS at 20:56

## 2021-08-30 RX ADMIN — ISOSORBIDE MONONITRATE 30 MG: 30 TABLET, EXTENDED RELEASE ORAL at 14:26

## 2021-08-30 RX ADMIN — INSULIN LISPRO 9 UNITS: 100 INJECTION, SOLUTION INTRAVENOUS; SUBCUTANEOUS at 20:35

## 2021-08-30 RX ADMIN — DILTIAZEM HYDROCHLORIDE 180 MG: 180 CAPSULE, COATED, EXTENDED RELEASE ORAL at 14:26

## 2021-08-30 RX ADMIN — OXYCODONE AND ACETAMINOPHEN 1 TABLET: 7.5; 325 TABLET ORAL at 00:38

## 2021-08-30 RX ADMIN — CITALOPRAM HYDROBROMIDE 40 MG: 20 TABLET ORAL at 14:26

## 2021-08-30 RX ADMIN — HYDRALAZINE HYDROCHLORIDE 50 MG: 50 TABLET, FILM COATED ORAL at 14:26

## 2021-08-30 RX ADMIN — QUETIAPINE FUMARATE 50 MG: 25 TABLET ORAL at 02:00

## 2021-08-30 RX ADMIN — HYDRALAZINE HYDROCHLORIDE 50 MG: 50 TABLET, FILM COATED ORAL at 00:38

## 2021-08-30 RX ADMIN — HEPARIN SODIUM 5000 UNITS: 5000 INJECTION INTRAVENOUS; SUBCUTANEOUS at 00:52

## 2021-08-30 ASSESSMENT — PAIN SCALES - GENERAL
PAINLEVEL_OUTOF10: 9
PAINLEVEL_OUTOF10: 10
PAINLEVEL_OUTOF10: 9
PAINLEVEL_OUTOF10: 0

## 2021-08-30 ASSESSMENT — ENCOUNTER SYMPTOMS
GASTROINTESTINAL NEGATIVE: 1
SHORTNESS OF BREATH: 1

## 2021-08-30 NOTE — TELEPHONE ENCOUNTER
-Pt wanting to know if Today's 4:00 Hospital follow up Appt be changed to Virtual appt?   -Pt is currently back in hospital for SOB.

## 2021-08-30 NOTE — PROGRESS NOTES
Nutrition Education    Positive nutrition screen for diet education. Pt admitted with CHF. Blood sugars of 525 on admission, last A1c of 10.0% on 7/27/21. Hx of non-compliance. Attempted CHF and carb control diet education. Pt not interested in diet education. Handouts left at bedside regarding carb control and low sodium, fluid restriction, and weight monitoring. · Verbally reviewed information with Patient  · Educated on CHF and carb control   · Written educational materials provided. · Contact name and number provided. · Refer to Patient Education activity for more details.     Electronically signed by Guerrero Ross, MS, RD, LD on 8/30/21 at 3:45 PM EDT    Contact: [phone]

## 2021-08-30 NOTE — PROGRESS NOTES
Sent the following to ILAN Light through a secure message:     FYI: the patient's Vanc trough came back at 23.8. Thank you.

## 2021-08-30 NOTE — PROGRESS NOTES
08/30/21 0345   NIV Type   Skin Assessment Clean, dry, & intact   Skin Protection for O2 Device Yes   Equipment Type v60   Mode CPAP   Mask Type Full face mask   Mask Size Large   Settings/Measurements   CPAP/EPAP 14 cmH2O   Resp 17   FiO2  50 %   Vt Exhaled 630 mL   Minute Volume 10.5 Liters   Mask Leak (lpm) 21 lpm   Comfort Level Good   Using Accessory Muscles No   Alarm Settings   Alarms On Y   Press Low Alarm 6 cmH2O   High Pressure Alarm 30 cmH2O   Delay Alarm 20 sec(s)   Resp Rate Low Alarm 6   High Respiratory Rate 50 br/min

## 2021-08-30 NOTE — PROGRESS NOTES
Sent the following to Dr. Valentino Cardoza through a secure message: The patient's blood glucose level came back at 628. His troponin went from . 13 to . 15. He is also asking for the Seroquel he takes at home. Could he have this? Will look for orders. Thank you. Need Callback: NO CALLBACK REQ  Read 12:42 AM     8/30/21 12:43 AM   Giving 9 units sliding scale per MAR. Would you like any additional coverage? Read 1:21 AM       Replied    8/30/21 1:22 AM   Please message Zulma Light called and informed this RN that she was ordering 10 units of IV regular insulin and she ordered the patient's Seroquel.

## 2021-08-30 NOTE — ED NOTES
Increased restlessness. Requested to come off Cpap.  Spoke with Dr. Landa Postal okay to remove placed on [address]  08/29/21 6345

## 2021-08-30 NOTE — PROGRESS NOTES
Hospitalist Progress Note      PCP: Ji Harrell MD    Date of Admission: 8/29/2021    Chief Complaint: Shortness of breath secondary to missing his hemodialysis    Hospital Course: Reviewed H&P    Subjective: Patient known to me from his prior admissions. Known for his noncompliance. Missed his last session of hemodialysis . Had his regular session this morning. Reports feeling slightly better. Seen with wife at bedside. Endorsed compliance. Patient currently offers no new complaints. Case discussed with RN this morning.     Medications:  Reviewed    Infusion Medications    sodium chloride      dextrose       Scheduled Medications    cefepime  1,000 mg IntraVENous Q24H    QUEtiapine  50 mg Oral Nightly    epoetin siddharth-epbx  1,600 Units IntraVENous Q MWF    Calcium Acetate (Phos Binder)  667 mg Oral TID WC    doxercalciferol  3 mcg IntraVENous Q MWF    sodium chloride flush  5-40 mL IntraVENous 2 times per day    aspirin  81 mg Oral Daily    citalopram  40 mg Oral Daily    dilTIAZem  180 mg Oral Daily    gabapentin  100 mg Oral TID    hydrALAZINE  50 mg Oral 3 times per day    isosorbide mononitrate  30 mg Oral Daily    losartan  50 mg Oral Daily    pravastatin  40 mg Oral Nightly    torsemide  100 mg Oral Daily    traZODone  100 mg Oral Nightly    hydrOXYzine  50 mg Oral Nightly    insulin lispro  0-18 Units SubCUTAneous TID WC    insulin lispro  0-9 Units SubCUTAneous Nightly    heparin (porcine)  5,000 Units SubCUTAneous 3 times per day    vancomycin (VANCOCIN) intermittent dosing (placeholder)   Other RX Placeholder     PRN Meds: heparin (porcine), sodium chloride, nitroGLYCERIN, sodium chloride flush, albuterol, albuterol sulfate HFA, ipratropium-albuterol, oxyCODONE-acetaminophen, glucose, dextrose, glucagon (rDNA), dextrose, ondansetron **OR** ondansetron, polyethylene glycol, acetaminophen **OR** acetaminophen      Intake/Output Summary (Last 24 hours) at 8/30/2021 Gem Zimmerman 912 filed at 8/30/2021 1800  Gross per 24 hour   Intake 1672.9 ml   Output 5650 ml   Net -3977.1 ml       Physical Exam Performed:  /63   Pulse 92   Temp 97.7 °F (36.5 °C) (Axillary)   Resp 18   Ht 5' 7\" (1.702 m)   Wt 259 lb 11.2 oz (117.8 kg)   SpO2 97%   BMI 40.68 kg/m²     General appearance:  Pleasant, obese male in no apparent distress, appears stated age and cooperative. HEENT: Pupils equal, round, and reactive to light. Extra ocular muscles intact. Conjunctivae/corneas clear. Neck: Supple, with full range of motion. No jugular venous distention. Trachea midline. Respiratory:  Normal respiratory effort. Diminished, Clear to auscultation, bilaterally without Rales/Wheezes/Rhonchi. Cardiovascular:  Regular rate and rhythm with normal S1/S2 without murmurs, rubs or gallops. Abdomen: Soft, obese, round non-tender, non-distended with normal bowel sounds. Musculoskeletal:  No clubbing, cyanosis or edema bilaterally. Full range of motion without deformity. Skin: Skin color, texture, turgor normal.  No rashes or lesions. Neurologic:  Neurovascularly intact without any focal sensory/motor deficits. Cranial nerves: II-XII intact, grossly non-focal.  Psychiatric:  Alert and oriented, thought content appropriate, normal insight  Capillary Refill: Brisk,3 seconds, normal  Peripheral Pulses: +2 palpable, equal bilaterally       Labs:   Recent Labs     08/29/21 1834 08/30/21 0257   WBC 16.4* 12.4*   HGB 11.0* 9.3*   HCT 34.3* 28.9*    257     Recent Labs     08/29/21 1834 08/30/21 0257   * 129*   K 4.9 5.1   CL 93* 92*   CO2 21 20*   BUN 72* 79*   CREATININE 7.4* 8.3*   CALCIUM 8.7 8.6     Recent Labs     08/29/21 1834   AST 13*   ALT 17   BILITOT <0.2   ALKPHOS 92     No results for input(s): INR in the last 72 hours.   Recent Labs     08/29/21 1834 08/29/21  2353 08/30/21  0257   TROPONINI 0.13* 0.15* 0.15*     Radiology:  XR CHEST PORTABLE   Final Result   Perihilar and right basilar opacities. Correlate with presentation for   pulmonary edema. Correlate clinically to exclude underlying pneumonia. Assessment/Plan:    Active Hospital Problems    Diagnosis Date Noted    Acute on chronic combined systolic and diastolic heart failure (HCC) [I50.43]      Priority: High    Type 2 diabetes, uncontrolled, with neuropathy ([facility] [E11.40, E11.65] 03/04/2014     Priority: High    Essential hypertension [I10] 11/14/2013     Priority: Medium    Chronic obstructive pulmonary disease ([facility] [J44.9] 05/14/2013     Priority: Medium    Obesity [E66.9] 11/03/2020    [facility] [J96.00] 11/09/2018    ZAINAB on CPAP [G47.33, Z99.89] 02/11/2016    Coronary artery disease involving native coronary artery of native heart without angina pectoris [I25.10]      Sepsis in patient with acute hypoxemic respiratory failure likley 2/2 pneumonia, WBC 16.4, , lactic acid 2.8 on admission. SPO2 80% on  RA, now on CPAP  -required CPAP on admission   - hypoxemic respiratory failure multipfactoral with COPD, CHF, restrictive disease caused by obesity and fluid overload d/t missing dialysis  -CXR revealed:  Perihilar and right basilar opacities. Correlate with presentation for pulmonary edema.   Correlate clinically to exclude underlying pneumonia  -30ml/kg IVF not given d/t ESRD and respiratory status.  -vancomycin and cefepime given in ED and continued 8/29/2021  -blood cultures ordered in ED - Pending   -fentanyl given in ED  -duoneb given in ED  -solumedrol given in ED  -continue prn nebulizers  -continue prn inhalers  -IS  -encourage coughing and deep breathing  -acapella     Acute on chronic combined systolic and diastolic heart failure  -CXR revealed: correlation with presentation for pulmonary edema  -ProBNP 38,040  -strict I&O  -daily weights  -continue demadex  -ECHO 1/5/2021: EF 55%     COPD  -continue CPAP for now  -oxygen therapy protocol  -continue prn inhalers and nebulizers     ESRD, HD M-W-F  - Missed last HD session . -bmp in am  -nephrology consult -  Had his HD on 8/30      Elevated troponin, 0.13  -likely 2/2 demand ischemia r/t ESRD  -  Troponin plateaued at 9.56   -tele monitoring     Morbid obesity  With Body mass index is 34.1 kg/m². Complicating assessment and treatment. Placing patient at risk for multiple co-morbidities as well as early death and contributing to the patient's presentation. Counseled on weight loss     DM2 with neuropathy, uncontrolled  -  -hemglobin a1c 10% on 7/27/2021  -hold home metformin  -hdssi  -poct ac/hs  -hypoglycemia protocol  -carb control diet  -continue gabapentin     Essential HTN in setting of known CAD  -continue hydralazine and losartan  -continue asa  -continue imdur     ZAINAB   -CPAP at night when not on bipap     Chronic normocytic anemia, 11.0/34.3 on admission  -no s/s of bleeding at this time  -cbc in am     Anxiety depression   -mood appears stable at this time  -continue home medications       DVT Prophylaxis: Heparin subcu  Diet: ADULT DIET; Regular; 3 carb choices (45 gm/meal); Low Sodium (2 gm)  Code Status: Full Code    PT/OT Eval Status: Ambulatory    Dispo -likely 2 to 3 days pending clinical improvement       The note was completed using Dragon -speech recognition software & EMR  . Every effort was made to ensure accuracy; however, inadvertent computerized transcription errors may be present.     Celine Pineda MD

## 2021-08-30 NOTE — PROGRESS NOTES
Educated the patient on his status as a high fall risk and the need for a bed alarm to keep him safe from falls. Patient is refusing use of the bed alarm stating he will call if he wants to get up up. This RN informed him that any resulting falls or injury would be his responsibility. Patient stated understanding.

## 2021-08-30 NOTE — CONSULTS
Nephrology Consult Note  [facility]. [place]        Reason for Consult: ESKD    Outpatient HD unit: [facility]  Days of dialysis: MWD. Last HD was on 8/26/21. Duration of each treatment: 3.5H  Vascular access: [facility]  EDW: 118kg    HISTORY OF PRESENT ILLNESS:      The patientis a 46 y.o. male who presents with SOB. Complained of cough but no fever or chills. Only had 2 HD treatments last week (Mon and Thurs). Was supposed to go last Saturday but missed. Post-weight on 08/26 was 120.4kg.       Past Medical History:        Diagnosis Date    Ambulatory dysfunction     walker for long distances, SOB with distance    Aortic stenosis     echo 2017    Arthritis     hands and hips    Asthma     Bilateral hilar adenopathy syndrome 6/3/2013    CAD (coronary artery disease)     Dr. Fili Fang [facility]) 04/19/2019    EF= 43%    CHF (congestive heart failure) (HCC)     Chronic pain     COPD (chronic obstructive pulmonary disease) ([address]     pulmonology Dr. Rosa Bean    Depression     Diabetes mellitus ([address]     borderline    Difficult intravenous access     Emphysema of lung ([address]     ESRD (end stage renal disease) on dialysis ([address]     MWF    Fear of needles     Gastric ulcer     GERD (gastroesophageal reflux disease)     Heart valve problem     bicuspic valve    Hemodialysis patient ([address]     History of spinal fracture     work incident    Hx of blood clots     Bilateral lower extremities; stents in place    Hyperlipidemia     Hypertension     MI (myocardial infarction) ([address] 2019    has had 9 MIs. 2019 was the last    Neuromuscular disorder ([address]     due to CVA    Numbness and tingling in left arm     from fistula    Pneumonia     PONV (postoperative nausea and vomiting)     Prolonged emergence from general anesthesia     States requires more medication than most people    Sleep apnea     Uses CPAP    Stroke ([address]     7mm thalamic cva 2017 deficts left side, left side weakness    TIA (transient ischemic attack)     Unspecified diseases of blood and blood-forming organs        Past Surgical History:        Procedure Laterality Date    AORTIC VALVE REPLACEMENT N/A 10/15/2019    TRANSCATHETER AORTIC VALVE REPLACEMENT FEMORAL APPROACH performed by Saqib Barros MD at [address] Right 7/2/2019    PERITONEAL DIALYSIS CATHETER REMOVAL performed by Sona Salas MD at [facility] COLONOSCOPY  2/29/2015    WNL    CORONARY ANGIOPLASTY WITH STENT PLACEMENT  05/26/15    CYST REMOVAL  08/14/2013    EXCISION CYSTS, NECK X2 AND ABDOMINAL benign    DIAGNOSTIC CARDIAC CATH LAB PROCEDURE      DIALYSIS FISTULA CREATION Left 10/30/2017    LEFT BRACHIAL CEPHALIC FISTULA    DIALYSIS FISTULA CREATION Left 3/27/2019    LIGATION  AV FISTULA performed by Bertha Bonds MD at [address], COLON, DIAGNOSTIC      OTHER SURGICAL HISTORY  02/01/2017    laparoscopic cholecystectomy with intraoperative cholangiogram    OTHER SURGICAL HISTORY  2018    PORT PLACEMENT  - vas cath    OTHER SURGICAL HISTORY Bilateral 06/26/2018    laprascopic peritoneal dialysis catheter placement    OTHER SURGICAL HISTORY Right 09/2018    peritoneal dialysis port placed on right side of abdomen    OTHER SURGICAL HISTORY  05/28/2019    PTA/Stenting R External Iliac artery    DC LAP INSERTION TUNNELED INTRAPERITONEAL CATHETER N/A 9/21/2018    LAPAROSCOPIC PERITONEAL DIALYSIS CATHETER REPLACEMENT performed by Sona Salas MD at [address] [place] [address]  01/06/2016    UPPER GASTROINTESTINAL ENDOSCOPY  01/29/2017    possible candida, otherwise normal appearing    VASCULAR SURGERY  aprx 2 years ago    2 stents placed, each side of groin       Current Medications:    No current facility-administered medications on file prior to encounter.      Current Outpatient Medications on File Prior to Encounter Medication Sig Dispense Refill    torsemide (DEMADEX) 100 MG tablet TAKE 1 TO 2 TABLETS BY MOUTH DAILY 180 tablet 3    Calcium Acetate, Phos Binder, 667 MG CAPS TAKE 1 CAPSULE BY MOUTH THREE TIMES DAILY WITH MEALS 90 capsule 3    pravastatin (PRAVACHOL) 40 MG tablet TAKE (1) TABLET BY MOUTH IN THE EVENING 30 tablet 1    QUEtiapine (SEROQUEL) 50 MG tablet TAKE (1) TABLET BY MOUTH IN THE EVENING 30 tablet 2    oxyCODONE-acetaminophen (PERCOCET) 7.5-325 MG per tablet Take 1 tablet by mouth every 4-6 hours as needed for Pain for up to 30 days. 150 tablet 0    BASAGLAR KWIKPEN 100 UNIT/ML injection pen ADMINISTER 60 UNITS UNDER THE SKIN EVERY NIGHT 15 mL 5    hydrocortisone (ANUSOL-HC) 2.5 % CREA rectal cream Place rectally 2 times daily 30 g 0    albuterol (PROVENTIL) (2.5 MG/3ML) 0.083% nebulizer solution INHALE 1 VIAL VIA NEBULIZER EVERY 6 HOURS AS NEEDED FOR WHEEZING 300 mL 5    nystatin (MYCOSTATIN) 415216 UNIT/GM cream Apply topically 2 times daily. 60 g 3    famotidine (PEPCID) 20 MG tablet TAKE 1 TABLET BY MOUTH TWICE DAILY AS NEEDED FOR HEARTBURN 180 tablet 0    hydrOXYzine (VISTARIL) 50 MG capsule TAKE 1 TO 2 CAPSULES BY MOUTH NIGHTLY 60 capsule 5    LINZESS 145 MCG capsule TAKE 1 CAPSULE BY MOUTH EVERY MORNING BEFORE BREAKFAST 30 capsule 10    hydrALAZINE (APRESOLINE) 50 MG tablet TAKE 1/2 TABLET BY MOUTH EVERY 8 HOURS 90 tablet 2    traZODone (DESYREL) 150 MG tablet TAKE (1) TABLET BY MOUTH NIGHTLY 30 tablet 10    predniSONE (DELTASONE) 20 MG tablet Take 3 tablets daily for 3 days, 2 tablets daily for 3 days and 1 tablet daily for 3 days.  18 tablet 0    dilTIAZem (CARDIZEM CD) 180 MG extended release capsule       cyclobenzaprine (FLEXERIL) 10 MG tablet TAKE 1 TABLET BY MOUTH EVERY 8 HOURS AS NEEDED 90 tablet 5    DULoxetine (CYMBALTA) 30 MG extended release capsule TAKE 1 CAPSULE BY MOUTH EVERY DAY 30 capsule 10    tiZANidine (ZANAFLEX) 4 MG tablet TAKE 1 TABLET BY MOUTH THREE TIMES DAILY 90 tablet 10    losartan (COZAAR) 50 MG tablet TAKE 1 TABLET BY MOUTH DAILY 30 tablet 10    pantoprazole (PROTONIX) 40 MG tablet TAKE (1) TABLET BY MOUTH EACH MORNING BEFORE BREAKFAST 90 tablet 1    simethicone (MYLICON) 80 MG chewable tablet Take 1 tablet by mouth every 6 hours as needed (cramping) 15 tablet 0    blood glucose test strips (GLUCOSE METER TEST) strip 1 each by In Vitro route 5 times daily As needed. 100 each 3    nitroGLYCERIN (NITROSTAT) 0.4 MG SL tablet DISSOLVE 1 TABLET UNDER THE TONGUE AS NEEDED FOR CHEST PAIN EVERY 5 MINUTES UP TO 3 TIMES. IF NO RELIEF CALL 911. 25 tablet 10    B Complex-C-Folic Acid (VIRT-CAPS) 1 MG CAPS TK ONE C PO  QD 90 capsule 1    citalopram (CELEXA) 40 MG tablet TAKE (1) TABLET BY MOUTH DAILY 30 tablet 10    insulin aspart (NOVOLOG FLEXPEN) 100 UNIT/ML injection pen Inject 20 Units into the skin 3 times daily (before meals) 15 pen 5    isosorbide mononitrate (IMDUR) 30 MG extended release tablet TAKE 1 TABLET BY MOUTH EVERY DAY 90 tablet 10    ondansetron (ZOFRAN ODT) 4 MG disintegrating tablet Take 1 tablet by mouth every 8 hours as needed for Nausea 60 tablet 0    blood glucose test strips (FREESTYLE LITE) strip Daily As needed. 100 strip 3    vitamin D (ERGOCALCIFEROL) 61433 units CAPS capsule TK 1 C PO WEEKLY  11    flunisolide (NASALIDE) 25 MCG/ACT (0.025%) SOLN Inhale 2 sprays into the lungs every 12 hours 1 Bottle 5    Glucose Blood (BLOOD GLUCOSE TEST STRIPS) STRP TEST 3-4 TIMES DAILY, AS DIRECTED 100 strip 3    albuterol sulfate  (90 Base) MCG/ACT inhaler Inhale 2 puffs into the lungs every 6 hours as needed for Wheezing 1 Inhaler 3    ipratropium-albuterol (DUONEB) 0.5-2.5 (3) MG/3ML SOLN nebulizer solution Inhale 3 mLs into the lungs every 6 hours as needed for Shortness of Breath 360 mL 1    calcium carbonate (TUMS) 500 MG chewable tablet Take 1 tablet by mouth 3 times daily as needed for Heartburn.       aspirin 81 MG chewable tablet Take 1 tablet by mouth daily. (Patient taking differently: Take 81 mg by mouth daily Indications: stopped on  for surgery ) 30 tablet 2    gabapentin (NEURONTIN) 100 MG capsule TAKE 1 TO 2 CAPSULES BY MOUTH THREE TIMES A  capsule 5    glucose monitoring kit (FREESTYLE) monitoring kit 1 kit by Does not apply route daily 1 kit 0    glucose monitoring kit (FREESTYLE) monitoring kit 1 kit by Does not apply route daily 1 kit 0    Tiotropium Bromide-Olodaterol (STIOLTO RESPIMAT) 2.5-2.5 MCG/ACT AERS Inhale 2 puffs into the lungs daily 2 Inhaler 0    Polyethylene Glycol 3350 GRAN       Blood Glucose Monitoring Suppl ADAM USE AS DIRECTED. 1 Device 0    Alcohol Swabs PADS USE AS DIRECTED 300 each 3       Allergies:  Morphine    Social History:    Social History     Socioeconomic History    Marital status:      Spouse name: Not on file    Number of children: Not on file    Years of education: Not on file    Highest education level: Not on file   Occupational History    Not on file   Tobacco Use    Smoking status: Current Every Day Smoker     Packs/day: 0.50     Years: 33.00     Pack years: 16.50     Types: Cigarettes     Last attempt to quit: 2020     Years since quittin.3    Smokeless tobacco: Never Used   Vaping Use    Vaping Use: Never used   Substance and Sexual Activity    Alcohol use: Not Currently     Alcohol/week: 0.0 standard drinks     Comment: occ    Drug use: No    Sexual activity: Yes     Partners: Female     Comment:    Other Topics Concern    Not on file   Social History Narrative    Not on file     Social Determinants of Health     Financial Resource Strain:     Difficulty of Paying Living Expenses:    Food Insecurity:     Worried About Running Out of Food in the Last Year:     Ran Out of Food in the Last Year:    Transportation Needs:     Lack of Transportation (Medical):      Lack of Transportation (Non-Medical):    Physical Activity:     Days of Exercise per Week:     Minutes of Exercise per Session:    Stress:     Feeling of Stress :    Social Connections:     Frequency of Communication with Friends and Family:     Frequency of Social Gatherings with Friends and Family:     Attends [employer] Services:     Active Member of Clubs or Organizations:     Attends Club or Organization Meetings:     Marital Status:    Intimate Partner Violence:     Fear of Current or Ex-Partner:     Emotionally Abused:     Physically Abused:     Sexually Abused:        Family History:       Problem Relation Age of Onset    Diabetes Mother     Heart Disease Father     Kidney Disease Sister         stage 4-kidney failure    Cancer Sister     Heart Disease Sister     Obesity Sister     Cancer Sister     Heart Disease Sister     Obesity Sister     Alcohol Abuse Brother        REVIEW OF SYSTEMS:    Review of Systems   Constitutional: Negative. HENT: Negative. Respiratory: Positive for shortness of breath. Cardiovascular: Positive for leg swelling. Gastrointestinal: Negative. Genitourinary: Positive for decreased urine volume. Neurological: Positive for weakness. PHYSICAL EXAM:    Vitals:    BP (!) 147/70   Pulse 86   Temp 97.5 °F (36.4 °C)   Resp 18   Ht 5' 7\" (1.702 m)   Wt 270 lb 1 oz (122.5 kg)   SpO2 99%   BMI 42.30 kg/m²   I/O last 3 completed shifts: In: 32.9 [IV Piggyback:32.9]  Out: 300 [Urine:300]  No intake/output data recorded. Physical Exam:  Physical Exam  Vitals reviewed. Constitutional:       General: He is not in acute distress. Appearance: Normal appearance. HENT:      Head: Normocephalic and atraumatic. Eyes:      General: No scleral icterus. Conjunctiva/sclera: Conjunctivae normal.   Cardiovascular:      Rate and Rhythm: Normal rate. Heart sounds: No friction rub. Pulmonary:      Effort: No respiratory distress. Breath sounds: Rales present. No wheezing.    Musculoskeletal: Right lower leg: Edema present. Left lower leg: Edema present. Neurological:      Mental Status: He is alert. DATA:    Recent Labs     08/29/21  1834 08/30/21  0257   WBC 16.4* 12.4*   HGB 11.0* 9.3*   HCT 34.3* 28.9*   MCV 96.4 95.6    257     Recent Labs     08/29/21  1834 08/29/21  2353 08/30/21  0257   *  --  129*   K 4.9  --  5.1   CL 93*  --  92*   CO2 21  --  20*   GLUCOSE 525* 628* 537*   MG  --   --  2.00   BUN 72*  --  79*   CREATININE 7.4*  --  8.3*   LABGLOM 8*  --  7*   GFRAA 9*  --  8*           IMPRESSION/RECOMMENDATIONS:      ESKD. - On HD MWF at [facility]. - Access: [facility]  - TW of 118kg. - Missed HD last Saturday. HD today with net UF of 5L as tolerated. Hyponatremia.  - From fluid overload and hyperglycemia. - UF with HD.  - Sodium and fluid restriction. Anemia.  - Continue EPO with HD (1600 units). Metabolic Acidosis. - From missed HD. CKD-MBD.  - Continue Hectorol and Calcium acetate. SOB. - From fluid overload and possible pneumonia. - Fluid removal with HD today should help. - Antibiotics per primary service. Leukocytosis improving. Thank you for allowing me to participate in the care of this patient. Please do not hesitate to contact me at [phone] with questions. Inocente Bailey MD, MD  8/30/2021  [facility]. [facility]

## 2021-08-30 NOTE — H&P
Hospital Medicine History & Physical      PCP: Fabiano Ball MD    Date of Admission: 8/29/2021    Date of Service: Pt seen/examined on 8/29/2021 and Admitted to Inpatient with expected LOS greater than two midnights due to medical therapy. Chief Complaint:  Shortness of breath    History Of Present Illness:      46 y.o. male, with PMH of CHF, COPD, ESRD, HTN, CAD, DM and obesity, who presented to [facility] with shortness of breath. History obtained from the patient and review of EMR. The patient stated today while at a family picnic he had a sudden onset of shortness of breath. The patient stated he does have a history of CHF and COPD and thinks this may be due to a COPD exacerbation. He stated he has \"never felt like this before\". The patient stated he felt like he was unable to \"catch his breath\". He stated he does have ESRD and does HD on [address] However, the patient had dialysis on Thursday and was to return on Saturday, but did not due to sleeping through his appointment. The patient stated he did take 3 breathing treatments prior to calling EMS that were ineffective. Per EMR, the patient's SPO2 was 80% and he was placed on CPAP. Patient was also given 0.5 mg IM of epi. In the emergency room a chest x-ray was obtained that revealed Perihilar and right basilar opacities. Correlate with presentation for pulmonary edema. Correlate clinically to exclude underlying pneumonia. The patient was also found to be septic with a heart rate of 137, WBC of 16.4 and a lactic acid of 2.8. He was given cefepime and vancomycin. The patient will be admitted for further evaluation and treatment. Nephrology has been consulted for the a. m. The patient denied any other associated symptoms as well as any aggravating and/or alleviating factors. At the time of this assessment, the patient was resting comfortably in bed.  He currently denies any chest pain, back pain, abdominal pain, shortness of breath, numbness, tingling, N/V/C/D, fever and/or chills.      Past Medical History:          Diagnosis Date    Ambulatory dysfunction     walker for long distances, SOB with distance    Aortic stenosis     echo 2017    Arthritis     hands and hips    Asthma     Bilateral hilar adenopathy syndrome 6/3/2013    CAD (coronary artery disease)     Dr. Liv Ledezma [facility]) 04/19/2019    EF= 43%    CHF (congestive heart failure) (HCC)     Chronic pain     COPD (chronic obstructive pulmonary disease) ([address]     pulmonology Dr. Yann Shi    Depression     Diabetes mellitus ([address]     borderline    Difficult intravenous access     Emphysema of lung ([address]     ESRD (end stage renal disease) on dialysis ([address]     MWF    Fear of needles     Gastric ulcer     GERD (gastroesophageal reflux disease)     Heart valve problem     bicuspic valve    Hemodialysis patient ([address]     History of spinal fracture     work incident    Hx of blood clots     Bilateral lower extremities; stents in place    Hyperlipidemia     Hypertension     MI (myocardial infarction) ([address] 2019    has had 9 MIs. 2019 was the last    Neuromuscular disorder ([address]     due to CVA    Numbness and tingling in left arm     from fistula    Pneumonia     PONV (postoperative nausea and vomiting)     Prolonged emergence from general anesthesia     States requires more medication than most people    Sleep apnea     Uses CPAP    Stroke ([address]     7mm thalamic cva 2017 deficts left side, left side weakness    TIA (transient ischemic attack)     Unspecified diseases of blood and blood-forming organs      Past Surgical History:          Procedure Laterality Date    AORTIC VALVE REPLACEMENT N/A 10/15/2019    TRANSCATHETER AORTIC VALVE REPLACEMENT FEMORAL APPROACH performed by Clemente Scruggs MD at [address] Right 7/2/2019    PERITONEAL DIALYSIS CATHETER REMOVAL performed by Clemente Scruggs Leanne Saucedo MD at [facility] COLONOSCOPY  2/29/2015    WNL    CORONARY ANGIOPLASTY WITH STENT PLACEMENT  05/26/15    CYST REMOVAL  08/14/2013    EXCISION CYSTS, NECK X2 AND ABDOMINAL benign    DIAGNOSTIC CARDIAC CATH LAB PROCEDURE      DIALYSIS FISTULA CREATION Left 10/30/2017    LEFT BRACHIAL CEPHALIC FISTULA    DIALYSIS FISTULA CREATION Left 3/27/2019    LIGATION  AV FISTULA performed by Wu Landa MD at [facility], COLON, DIAGNOSTIC      OTHER SURGICAL HISTORY  02/01/2017    laparoscopic cholecystectomy with intraoperative cholangiogram    OTHER SURGICAL HISTORY  2018    PORT PLACEMENT  - vas cath    OTHER SURGICAL HISTORY Bilateral 06/26/2018    laprascopic peritoneal dialysis catheter placement    OTHER SURGICAL HISTORY Right 09/2018    peritoneal dialysis port placed on right side of abdomen    OTHER SURGICAL HISTORY  05/28/2019    PTA/Stenting R External Iliac artery    HI LAP INSERTION TUNNELED INTRAPERITONEAL CATHETER N/A 9/21/2018    LAPAROSCOPIC PERITONEAL DIALYSIS CATHETER REPLACEMENT performed by Kvng Bobby MD at [facility]  01/06/2016    UPPER GASTROINTESTINAL ENDOSCOPY  01/29/2017    possible candida, otherwise normal appearing    VASCULAR SURGERY  aprx 2 years ago    2 stents placed, each side of groin     Medications Prior to Admission:      Prior to Admission medications    Medication Sig Start Date End Date Taking?  Authorizing Provider   torsemide (DEMADEX) 100 MG tablet TAKE 1 TO 2 TABLETS BY MOUTH DAILY 8/12/21   Joe Hyde MD   Calcium Acetate, Phos Binder, 667 MG CAPS TAKE 1 CAPSULE BY MOUTH THREE TIMES DAILY WITH MEALS 8/12/21   Joe Hyed MD   pravastatin (PRAVACHOL) 40 MG tablet TAKE (1) TABLET BY MOUTH IN THE EVENING 8/5/21   Joe Hyde MD   QUEtiapine (SEROQUEL) 50 MG tablet TAKE (1) TABLET BY MOUTH IN THE EVENING 8/5/21   Joe Hyde MD oxyCODONE-acetaminophen (PERCOCET) 7.5-325 MG per tablet Take 1 tablet by mouth every 4-6 hours as needed for Pain for up to 30 days. 8/3/21 9/2/21  MD BRYN WinstonIKPEN 100 UNIT/ML injection pen ADMINISTER 60 UNITS UNDER THE SKIN EVERY NIGHT 7/29/21   Fabiano Ball MD   hydrocortisone (ANUSOL-HC) 2.5 % CREA rectal cream Place rectally 2 times daily 6/9/21   Fabiano Ball MD   albuterol (PROVENTIL) (2.5 MG/3ML) 0.083% nebulizer solution INHALE 1 VIAL VIA NEBULIZER EVERY 6 HOURS AS NEEDED FOR WHEEZING 6/2/21   Fabiano Ball MD   nystatin (MYCOSTATIN) 113288 UNIT/GM cream Apply topically 2 times daily. 5/28/21   Fabiano Ball MD   famotidine (PEPCID) 20 MG tablet TAKE 1 TABLET BY MOUTH TWICE DAILY AS NEEDED FOR HEARTBURN 5/28/21   Fabiano Ball MD   hydrOXYzine (VISTARIL) 50 MG capsule TAKE 1 TO 2 CAPSULES BY MOUTH NIGHTLY 5/26/21   Fabiano Ball MD   gabapentin (NEURONTIN) 100 MG capsule TAKE 1 TO 2 CAPSULES BY MOUTH THREE TIMES A DAY 5/25/21 6/25/21  Fabiano Ball MD   LINZESS 145 MCG capsule TAKE 1 CAPSULE BY MOUTH EVERY MORNING BEFORE BREAKFAST 5/25/21   Fabiano Ball MD   hydrALAZINE (APRESOLINE) 50 MG tablet TAKE 1/2 TABLET BY MOUTH EVERY 8 HOURS 5/24/21   Fabiano Ball MD   traZODone (DESYREL) 150 MG tablet TAKE (1) TABLET BY MOUTH NIGHTLY 5/7/21   JAY Turcios CNP   predniSONE (DELTASONE) 20 MG tablet Take 3 tablets daily for 3 days, 2 tablets daily for 3 days and 1 tablet daily for 3 days.  5/7/21   JAY Turcios CNP   dilTIAZem (CARDIZEM CD) 180 MG extended release capsule  4/15/21   Historical Provider, MD   cyclobenzaprine (FLEXERIL) 10 MG tablet TAKE 1 TABLET BY MOUTH EVERY 8 HOURS AS NEEDED 5/3/21   Fabiano Ball MD   DULoxetine (CYMBALTA) 30 MG extended release capsule TAKE 1 CAPSULE BY MOUTH EVERY DAY 4/27/21   Fabiano Ball MD   tiZANidine (ZANAFLEX) 4 MG tablet TAKE 1 TABLET BY MOUTH THREE TIMES DAILY 4/27/21   Fabiano Ball MD   losartan (COZAAR) 50 MG tablet TAKE 1 TABLET BY MOUTH DAILY 3/25/21   Ronnie Mcdaniel MD   pantoprazole (PROTONIX) 40 MG tablet TAKE (1) TABLET BY MOUTH EACH MORNING BEFORE BREAKFAST 3/9/21   Ronnie Mcdaniel MD   simethicone (MYLICON) 80 MG chewable tablet Take 1 tablet by mouth every 6 hours as needed (cramping) 2/3/21   Petr Silva MD   glucose monitoring kit (FREESTYLE) monitoring kit 1 kit by Does not apply route daily 1/8/21   Ronnie Mcdaniel MD   blood glucose test strips (GLUCOSE METER TEST) strip 1 each by In Vitro route 5 times daily As needed. 1/8/21   Ronnie Mcdaniel MD   nitroGLYCERIN (NITROSTAT) 0.4 MG SL tablet DISSOLVE 1 TABLET UNDER THE TONGUE AS NEEDED FOR CHEST PAIN EVERY 5 MINUTES UP TO 3 TIMES. IF NO RELIEF CALL 911. 1/7/21   Ronnie Mcdaniel MD   B Complex-C-Folic Acid (VIRT-CAPS) 1 MG CAPS TK ONE C PO  QD 11/18/20   Ronnie Mcdaniel MD   citalopram (CELEXA) 40 MG tablet TAKE (1) TABLET BY MOUTH DAILY 11/4/20   Ronnie Mcdaniel MD   insulin aspart (NOVOLOG FLEXPEN) 100 UNIT/ML injection pen Inject 20 Units into the skin 3 times daily (before meals) 10/12/20   Ronnie Mcdaniel MD   isosorbide mononitrate (IMDUR) 30 MG extended release tablet TAKE 1 TABLET BY MOUTH EVERY DAY 9/1/20   Ronnie Mcdaniel MD   ondansetron (ZOFRAN ODT) 4 MG disintegrating tablet Take 1 tablet by mouth every 8 hours as needed for Nausea 8/5/20   Ronnie Mcdaniel MD   blood glucose test strips (FREESTYLE LITE) strip Daily As needed.  8/19/19   Ronnie Mcdaniel MD   glucose monitoring kit (FREESTYLE) monitoring kit 1 kit by Does not apply route daily 8/19/19   Ronnie Mcdaniel MD   vitamin D (ERGOCALCIFEROL) 58643 units CAPS capsule TK 1 C PO WEEKLY 6/2/19   Historical Provider, MD   flunisolide (NASALIDE) 25 MCG/ACT (0.025%) SOLN Inhale 2 sprays into the lungs every 12 hours 5/22/19   Nikhil Macias MD   Tiotropium Bromide-Olodaterol (STIOLTO RESPIMAT) 2.5-2.5 MCG/ACT AERS Inhale 2 puffs into the lungs daily 5/21/19   Nikhil Macias MD   Polyethylene Glycol 3350 GRAN  5/2/18   Historical Provider, MD   Glucose Blood (BLOOD GLUCOSE TEST STRIPS) STRP TEST 3-4 TIMES DAILY, AS DIRECTED 4/25/18   Kelly Santos MD   Blood Glucose Monitoring Suppl ADAM USE AS DIRECTED. 4/25/18   Kelly Santos MD   Alcohol Swabs PADS USE AS DIRECTED 4/25/18   Kelly Santos MD   albuterol sulfate  (90 Base) MCG/ACT inhaler Inhale 2 puffs into the lungs every 6 hours as needed for Wheezing 11/8/17   Gina Ayon MD   ipratropium-albuterol (DUONEB) 0.5-2.5 (3) MG/3ML SOLN nebulizer solution Inhale 3 mLs into the lungs every 6 hours as needed for Shortness of Breath 10/15/17   Daniel Ferguson MD   calcium carbonate (TUMS) 500 MG chewable tablet Take 1 tablet by mouth 3 times daily as needed for Heartburn. Historical Provider, MD   aspirin 81 MG chewable tablet Take 1 tablet by mouth daily. Patient taking differently: Take 81 mg by mouth daily Indications: stopped on 6/25 for surgery  5/14/13   Merlin Dust, MD     Allergies:  Morphine    Social History:      The patient currently lives at home    TOBACCO:   reports that he has been smoking cigarettes. He has a 16.50 pack-year smoking history. He has never used smokeless tobacco.  ETOH:   reports previous alcohol use. E-Cigarettes/Vaping Use     Questions Responses    E-Cigarette/Vaping Use Never User    Start Date     Passive Exposure     Quit Date     Counseling Given     Comments         Family History:      Reviewed in detail. Positive as follows:        Problem Relation Age of Onset    Diabetes Mother     Heart Disease Father     Kidney Disease Sister         stage 4-kidney failure    Cancer Sister     Heart Disease Sister     Obesity Sister     Cancer Sister     Heart Disease Sister     Obesity Sister     Alcohol Abuse Brother      REVIEW OF SYSTEMS COMPLETED:   Pertinent positives as noted in the HPI. All other systems reviewed and negative.     PHYSICAL EXAM PERFORMED:    BP (!) 160/93   Pulse pneumonia    EKG:  I have reviewed the EKG with the following interpretation:     XR CHEST PORTABLE   Final Result   Perihilar and right basilar opacities. Correlate with presentation for   pulmonary edema. Correlate clinically to exclude underlying pneumonia. ASSESSMENT:    Active Hospital Problems    Diagnosis Date Noted    Acute on chronic combined systolic and diastolic heart failure (HCC) [I50.43]      Priority: High    Type 2 diabetes, uncontrolled, with neuropathy ([facility] [E11.40, E11.65] 03/04/2014     Priority: High    Essential hypertension [I10] 11/14/2013     Priority: Medium    Chronic obstructive pulmonary disease ([facility] [J44.9] 05/14/2013     Priority: Medium    Obesity [E66.9] 11/03/2020    ZAINAB on CPAP [G47.33, Z99.89] 02/11/2016    Coronary artery disease involving native coronary artery of native heart without angina pectoris [I25.10]      PLAN:    Sepsis in patient with acute hypoxemic respiratory failure likley 2/2 pneumonia, WBC 16.4, , lactic acid 2.8 on admission. SPO2 80% on  RA, now on CPAP  -required CPAP on admission  - hypoxemic respiratory failure multipfactoral with COPD, CHF, restrictive disease caused by obesity and fluid overload d/t missing dialysis  -CXR revealed:  Perihilar and right basilar opacities. Correlate with presentation for pulmonary edema.   Correlate clinically to exclude underlying pneumonia  -30ml/kg IVF not given d/t ESRD and respiratory status.  -vancomycin and cefepime given in ED and continued 8/29/2021  -blood cultures ordered in ED  -fentanyl given in ED  -duoneb given in ED  -solumedrol given in ED  -continue prn nebulizers  -continue prn inhalers  -IS  -encourage coughing and deep breathing  -acapella    Acute on chronic combined systolic and diastolic heart failure  -CXR revealed: correlation with presentation for pulmonary edema  -ProBNP 38,040  -strict I&O  -daily weights  -continue demadex  -ECHO 1/5/2021: EF 55%    COPD  -continue CPAP for now  -oxygen therapy protocol  -continue prn inhalers and nebulizers    ESRD, HD M-W-F  -bmp in am  -nephrology consult - appreciate recommendations in advance    Elevated troponin, 0.13  -likely 2/2 demand ischemia r/t ESRD  -trend troponin  -tele monitoring    Morbid obesity  With Body mass index is 40.1 kg/m². Complicating assessment and treatment. Placing patient at risk for multiple co-morbidities as well as early death and contributing to the patient's presentation. Counseled on weight loss    DM2 with neuropathy, uncontrolled  -  -hemglobin a1c 10.0 on 7/27/2021  -hold home metformin  -hdssi  -poct ac/hs  -hypoglycemia protocol  -carb control diet  -continue gabapentin    Essential HTN in setting of known CAD  -continue hydralazine and losartan  -continue asa  -continue imdur    ZAINAB   -CPAP at night when not on bipap    Chronic normocytic anemia, 11.0/34.3 on admission  -no s/s of bleeding at this time  -cbc in am    Anxiety depression   -mood appears stable at this time  -continue home medications    DVT Prophylaxis: heparin    Diet: No diet orders on file     Code Status: Prior    PT/OT Eval Status: No indication for need at this time    Dispo - 2-3 days pending clinical improvement     Manuel Bethea, JAY - CNP    Thank you Juli Booker MD for the opportunity to be involved in this patient's care.  If you have any questions or concerns please feel free to contact me at [phone].  ---------------------------------------Anticipated Dr. Joel barr---------------------------------

## 2021-08-30 NOTE — ED NOTES
RT at bedside states patient is improved from initial presentation. After speaking with Charge Nurse patient to be moved to a med-surg unit.  As no longer needed CPAP continuously      Natalee Valiente RN  08/29/21 0890

## 2021-08-30 NOTE — CONSULTS
Pharmacy Note  Vancomycin Consult  Dx - HAP  Hx of Hemodialysis      Recent Labs     08/29/21  1834   CREATININE 7.4*       Recent Labs     08/29/21  1834   WBC 16.4*       CrCl cannot be calculated (Unknown ideal weight.). Wt Readings from Last 1 Encounters:   08/29/21 271 lb 2.7 oz (123 kg)       Assessment/Plan:  Give Vancomycin 1500mg x 1 (ordered earlier to be given in ER). Vancomycin random level in the AM (8/30 at 0600)  Will follow labs accordingly  Gume Loving PharmD 8/29/2021 10:02 PM     8/30  Vanc random = 23.8 mcg/mL at 0257. Hold vancomycin today. Recheck vanc random tomorrow in the AM (8/31 at 2100). Gretel Pickering PharmD  8/30/2021 6:08 AM    8/31/21  Vancomycin Day ____3____  Current Dosing: ___Pulse____  Recent Labs     08/30/21  0257 08/31/21  0704   VANCORANDOM 23.8* 11.5     Recent Labs     08/29/21  1834 08/30/21  0257 08/31/21  0704   WBC 16.4* 12.4*  --    CREATININE 7.4* 8.3* 6.0*   Estimated Creatinine Clearance: 18 mL/min (A) (based on SCr of 6 mg/dL [facility])). - HD MWF  - Vancomycin 1,250 mg IV x 1  - Random level tomorrow with routine labs before HD   Rosa Rosales PharmD    8/31/2021 8:41 AM    9/1/21  Vancomycin Day ___4_____  Current Dosing: ___Pulse_____  Recent Labs     08/30/21  0257 08/31/21  0704 09/01/21  0800   VANCORANDOM 23.8* 11.5 19.4     Recent Labs     08/30/21  0257 08/31/21  0704 09/01/21  0800   WBC 12.4* 12.5* 10.1   CREATININE 8.3* 6.0* 6.9*   Estimated Creatinine Clearance: 16 mL/min (A) (based on SCr of 6.9 mg/dL [facility])). - HD MWF  - Vancomycin 500 mg IVBP x 1 after dialysis today.   - Vancomycin random level ordered for 9/3 AM.   Rosa Rosales PharmD    9/1/2021 10:48 AM

## 2021-08-30 NOTE — FLOWSHEET NOTE
Treatment time: 3.5 hours  Net UF: 4000 ml     Pre weight: 122.5 kg   Post weight: 117.8 kg (standing)  EDW:118 kg     Access used: LCW CVC  Access function: Good with -450 ml/min     Medications or blood products given: Retacrit, Hecotoal     Regular outpatient schedule: [address]     Summary of response to treatment: Tolerated tx fair. Had some abominal cramping noted in last hour of tx. UF goal reduced from 5L down to 4L when cramping occurred.  No other HD related complaints or  Complications.      Copy of dialysis treatment record placed in chart, to be scanned into EMR.       08/30/21 0950 08/30/21 1325   Treatment   Time On 0947  --    Time Off  --  1320   Vital Signs   BP (!) 147/70 (!) 140/56   Temp 97.5 °F (36.4 °C) 98.2 °F (36.8 °C)   Pulse 86 86   Resp 18 20   Dialysis Bath   K+ (Potassium) 2  --    Ca+ (Calcium) 2.5  --    Na+ (Sodium) 138  --    HCO3 (Bicarb) 32  --

## 2021-08-30 NOTE — CARE COORDINATION
CASE MANAGEMENT INITIAL ASSESSMENT      Reviewed chart and completed assessment with:patient  Explained Case Management role/services. Primary contact information:see below    Health Care Decision Maker :   Primary Decision Maker: Crystal Ann - Spouse - [phone]          Can this person be reached and be able to respond quickly, such as within a few minutes or hours? Yes    Admit date/status:08/29/2021  Diagnosis:Acute respiratory failure  Is this a Readmission?:  Yes, pt states he could not breath and in acute respiratory failure upon admission    Insurance:[facility] [address] required for SNF: Yes       3 night stay required: No    Living arrangements, Adls, care needs, prior to admission:Pt lives w/spouse in a double wide trailer, ramp to enter, has no HHC at this time, spouse will drive pt    Transportation:private     Durable Medical Equipment at home:  Walker_x_Cane__RTS__ BSC__Shower Chair__  02__ HHN__ CPAP_x_  BiPap__  Hospital Bed__ W/C___ Other_scooter_________    Services in the home and/or outpatient, prior to [address] (if applicable)   · Name: [facility]  · Address:  · Dialysis Schedule: MWF 1100  · Phone:  · Fax:    PT/OT recs:not initiated    Hospital Exemption Notification (HEN):none    Barriers to discharge:none    Plan/comments:CM met pt at bedside who stated that he is noncompliant w/meds because sticking his fingers to check his blood sugars hurt his fingers. CM inquired w/OP Rx who stated that the devices that check your blood sugar on the back of your arm are not covered by his insurance. Pt has been missing dialysis appointments, stating that he sometimes finds it difficult to get to appointments because of insomnia. CM inquired about changing chair times and he is unable to do this as facility does not provide later times. PT is open to [facility] should it be needed. Spouse will drive home.   CM will continue to follow for needs,as pt is currently on O2, wears 0 at home.   Aly Barksdale RN      ECOC on chart for MD signature

## 2021-08-30 NOTE — RT PROTOCOL NOTE
RT Inhaler-Nebulizer Bronchodilator Protocol Note    There is a bronchodilator order in the chart from a provider indicating to follow the RT Bronchodilator Protocol and there is an Initiate RT Bronchodilator Protocol order as well (see protocol at bottom of note). The findings from the last RT Protocol Assessment were as follows:  Smoking: >15 Pack years  Surgical Status: No surgery  Xray: Multiple lobe infiltrates/atelectasis/pleural effusion/edema  Respiratory Pattern: RR 12-20  Mental Status: Alert and Oriented  Breath Sounds: Diminished and/or crackles  Cough: Strong, spontaneous, non-productive  Activity Level: Walking unassisted  Oxygen Requirement: 29% or 3LNC - 5LNC/40%  Indication for Bronchodilator Therapy: On home bronchodilators  Bronchodilator Assessment Score: 6    Aerosolized bronchodilator medication orders have been revised according to the RT Bronchodilator Protocol. RT Inhaler-Nebulizer Bronchodilator Protocol:    Respiratory Therapist to perform RT Therapy Protocol Assessment then follow the protocol. No Indications - adjust the frequency to every 6 hours PRN wheezing or bronchospasm, if no treatments needed after 48 hours then discontinue using Per Protocol order mode. If indication present, adjust the RT bronchodilator orders based on the Bronchodilator Assessment Score as follows:    0-6 - enter or revise RT bronchodilator order to Albuterol Inhaler order with frequency of every 2 hours PRN for wheezing or increased work of breathing using Per Protocol order mode. If Albuterol Inhaler not tolerated or not effective, then discontinue the Albuterol Inhaler order and enter Albuterol Nebulizer order with same frequency and PRN reasons. Repeat RT Therapy Protocol Assessment as needed.     7-10 - discontinue any other Inpatient aerosolized bronchodilator medication orders and enter or revise two Albuterol Inhaler orders, one with BID frequency and one with frequency of every 2 hours PRN wheezing or increased work of breathing using Per Protocol order mode. Repeat RT Therapy Protocol Assessment with second treatment then BID and as needed. If Albuterol Inhaler not tolerated or not effective, then discontinue the Albuterol Inhaler orders and enter two Albuterol Nebulizer orders with same frequencies and PRN reasons. 11-13 - discontinue any other Inpatient aerosolized bronchodilator medication orders and enter DuoNeb Nebulizer orders QID frequency and an Albuterol Nebulizer order every 2 hours PRN wheezing or increased work of breathing using Per Protocol order mode. Repeat RT Therapy Protocol Assessment with second treatment then QID and as needed. Greater than 13 - discontinue any other Inpatient bronchodilator aerosolized medication orders and enter DuoNeb Nebulizer order every 4 hours frequency and Albuterol Nebulizer every 2 hours PRN wheezing or increased work of breathing using Per Protocol order mode. Repeat RT Therapy Protocol Assessment with second treatment then every 4 hours and as needed. RT to enter RT Home Evaluation for COPD & MDI Assessment order using Per Protocol order mode.     Electronically signed by Debbie Huddleston RCP on 8/29/2021 at 11:47 PM

## 2021-08-30 NOTE — CONSULTS
[id number] [facility] Wound Ostomy Continence Nurse  Consult Note       NAME:  Jamie Morley  MEDICAL RECORD NUMBER:  [id number]  AGE: 46 y.o. GENDER: male  : 1970  TODAY'S DATE:  2021    Subjective: I have had the one on my foot a long time. Last night I ripped a piece of skin off my big toe. Reason for WOCN Evaluation and Assessment: R Foot - diabetic  R Great Toe - traumatic      Jamie Morley is a 46 y.o. male referred by:   [] Physician  [x] Nursing  [] Other:     Wound Identification:  Wound Type: diabetic and traumatic  Contributing Factors: diabetes, poor glucose control, decreased mobility, shear force, obesity and CVA, hemodialysis    Wound History: 46 y.o. male, with PMH of CHF, COPD, ESRD, HTN, CAD, DM and obesity, who presented to [facility] with shortness of breath. History obtained from the patient and review of EMR. The patient stated today while at a family picnic he had a sudden onset of shortness of breath. The patient stated he does have a history of CHF and COPD and thinks this may be due to a COPD exacerbation. He stated he has \"never felt like this before\". The patient stated he felt like he was unable to \"catch his breath\". He stated he does have ESRD and does HD on [facility]. However, the patient had dialysis on Thursday and was to return on Saturday, but did not due to sleeping through his appointment. The patient stated he did take 3 breathing treatments prior to calling EMS that were ineffective. Per EMR, the patient's SPO2 was 80% and he was placed on CPAP. Patient was also given 0.5 mg IM of epi. In the emergency room a chest x-ray was obtained that revealed Perihilar and right basilar opacities. Correlate with presentation for pulmonary edema. Correlate clinically to exclude underlying pneumonia. The patient was also found to be septic with a heart rate of 137, WBC of 16.4 and a lactic acid of 2.8. He was given cefepime and vancomycin. The patient will be admitted for further evaluation and treatment. Nephrology has been consulted for the a. m. The patient denied any other associated symptoms as well as any aggravating and/or alleviating factors. At the time of this assessment, the patient was resting comfortably in bed.  He currently denies any chest pain, back pain, abdominal pain, shortness of breath, numbness, tingling, N/V/C/D, fever and/or chills  Current Wound Care Treatment: R Foot and R Great Toe - hydrogel, foam dressing    Patient Goal of Care:  [x] Wound Healing  [] Odor Control  [] Palliative Care  [] Pain Control   [] Other:         PAST MEDICAL HISTORY        Diagnosis Date    Ambulatory dysfunction     walker for long distances, SOB with distance    Aortic stenosis     echo 2017    Arthritis     hands and hips    Asthma     Bilateral hilar adenopathy syndrome 6/3/2013    CAD (coronary artery disease)     Dr. Homar Nicolas [facility]) 04/19/2019    EF= 43%    CHF (congestive heart failure) ([address]     Chronic pain     COPD (chronic obstructive pulmonary disease) ([address]     pulmonology Dr. Chyna Sevilla    Depression     Diabetes mellitus ([address]     borderline    Difficult intravenous access     Emphysema of lung ([address]     ESRD (end stage renal disease) on dialysis ([address]     MWF    Fear of needles     Gastric ulcer     GERD (gastroesophageal reflux disease)     Heart valve problem     bicuspic valve    Hemodialysis patient ([address]     History of spinal fracture     work incident    Hx of blood clots     Bilateral lower extremities; stents in place    Hyperlipidemia     Hypertension     MI (myocardial infarction) ([address] 2019    has had 9 MIs. 2019 was the last    Neuromuscular disorder ([address]     due to CVA    Numbness and tingling in left arm     from fistula    Pneumonia     PONV (postoperative nausea and vomiting)     Prolonged emergence from general anesthesia     States requires more medication than most people    Sleep apnea     Uses CPAP    Stroke ([facility]     7mm thalamic cva 2017 deficts left side, left side weakness    TIA (transient ischemic attack)     Unspecified diseases of blood and blood-forming organs        PAST SURGICAL HISTORY    Past Surgical History:   Procedure Laterality Date    AORTIC VALVE REPLACEMENT N/A 10/15/2019    TRANSCATHETER AORTIC VALVE REPLACEMENT FEMORAL APPROACH performed by Jonathan Tovar MD at [address] [place] [address] Right 7/2/2019    PERITONEAL DIALYSIS CATHETER REMOVAL performed by Jordan Cadet MD at [facility] COLONOSCOPY  2/29/2015    WN    CORONARY ANGIOPLASTY WITH STENT PLACEMENT  05/26/15    CYST REMOVAL  08/14/2013    EXCISION CYSTS, NECK X2 AND ABDOMINAL benign    DIAGNOSTIC CARDIAC CATH LAB PROCEDURE      DIALYSIS FISTULA CREATION Left 10/30/2017    LEFT BRACHIAL CEPHALIC FISTULA    DIALYSIS FISTULA CREATION Left 3/27/2019    LIGATION  AV FISTULA performed by Vikki Ambrosio MD at [address] [place] [address], [place], DIAGNOSTIC      OTHER SURGICAL HISTORY  02/01/2017    laparoscopic cholecystectomy with intraoperative cholangiogram    OTHER SURGICAL HISTORY  2018    PORT PLACEMENT  - vas cath    OTHER SURGICAL HISTORY Bilateral 06/26/2018    laprascopic peritoneal dialysis catheter placement    OTHER SURGICAL HISTORY Right 09/2018    peritoneal dialysis port placed on right side of abdomen    OTHER SURGICAL HISTORY  05/28/2019    PTA/Stenting R External Iliac artery    SD LAP INSERTION TUNNELED INTRAPERITONEAL CATHETER N/A 9/21/2018    LAPAROSCOPIC PERITONEAL DIALYSIS CATHETER REPLACEMENT performed by Jordan Cadet MD at [address] [place] [address]  01/06/2016    UPPER GASTROINTESTINAL ENDOSCOPY  01/29/2017    possible candida, otherwise normal appearing    VASCULAR SURGERY  aprx 2 years ago    2 stents placed, each side of groin       FAMILY HISTORY    Family History   Problem Relation Age of Onset    Diabetes Mother     Heart Disease Father     Kidney Disease Sister         stage 4-kidney failure    Cancer Sister     Heart Disease Sister     Obesity Sister     Cancer Sister     Heart Disease Sister     Obesity Sister     Alcohol Abuse Brother        SOCIAL HISTORY    Social History     Tobacco Use    Smoking status: Current Every Day Smoker     Packs/day: 0.50     Years: 33.00     Pack years: 16.50     Types: Cigarettes     Last attempt to quit: 2020     Years since quittin.3    Smokeless tobacco: Never Used   Vaping Use    Vaping Use: Never used   Substance Use Topics    Alcohol use: Not Currently     Alcohol/week: 0.0 standard drinks     Comment: occ    Drug use: No       ALLERGIES    Allergies   Allergen Reactions    Morphine Nausea And Vomiting       MEDICATIONS    No current facility-administered medications on file prior to encounter. Current Outpatient Medications on File Prior to Encounter   Medication Sig Dispense Refill    torsemide (DEMADEX) 100 MG tablet TAKE 1 TO 2 TABLETS BY MOUTH DAILY 180 tablet 3    Calcium Acetate, Phos Binder, 667 MG CAPS TAKE 1 CAPSULE BY MOUTH THREE TIMES DAILY WITH MEALS 90 capsule 3    pravastatin (PRAVACHOL) 40 MG tablet TAKE (1) TABLET BY MOUTH IN THE EVENING 30 tablet 1    QUEtiapine (SEROQUEL) 50 MG tablet TAKE (1) TABLET BY MOUTH IN THE EVENING 30 tablet 2    oxyCODONE-acetaminophen (PERCOCET) 7.5-325 MG per tablet Take 1 tablet by mouth every 4-6 hours as needed for Pain for up to 30 days.  150 tablet 0    BASAGLAR KWIKPEN 100 UNIT/ML injection pen ADMINISTER 60 UNITS UNDER THE SKIN EVERY NIGHT 15 mL 5    hydrocortisone (ANUSOL-HC) 2.5 % CREA rectal cream Place rectally 2 times daily 30 g 0    albuterol (PROVENTIL) (2.5 MG/3ML) 0.083% nebulizer solution INHALE 1 VIAL VIA NEBULIZER EVERY 6 HOURS AS NEEDED FOR WHEEZING 300 mL 5    nystatin (MYCOSTATIN) 562742 UNIT/GM cream Apply topically 2 times daily. 60 g 3    famotidine (PEPCID) 20 MG tablet TAKE 1 TABLET BY MOUTH TWICE DAILY AS NEEDED FOR HEARTBURN 180 tablet 0    hydrOXYzine (VISTARIL) 50 MG capsule TAKE 1 TO 2 CAPSULES BY MOUTH NIGHTLY 60 capsule 5    LINZESS 145 MCG capsule TAKE 1 CAPSULE BY MOUTH EVERY MORNING BEFORE BREAKFAST 30 capsule 10    hydrALAZINE (APRESOLINE) 50 MG tablet TAKE 1/2 TABLET BY MOUTH EVERY 8 HOURS 90 tablet 2    traZODone (DESYREL) 150 MG tablet TAKE (1) TABLET BY MOUTH NIGHTLY 30 tablet 10    predniSONE (DELTASONE) 20 MG tablet Take 3 tablets daily for 3 days, 2 tablets daily for 3 days and 1 tablet daily for 3 days. 18 tablet 0    dilTIAZem (CARDIZEM CD) 180 MG extended release capsule       cyclobenzaprine (FLEXERIL) 10 MG tablet TAKE 1 TABLET BY MOUTH EVERY 8 HOURS AS NEEDED 90 tablet 5    DULoxetine (CYMBALTA) 30 MG extended release capsule TAKE 1 CAPSULE BY MOUTH EVERY DAY 30 capsule 10    tiZANidine (ZANAFLEX) 4 MG tablet TAKE 1 TABLET BY MOUTH THREE TIMES DAILY 90 tablet 10    losartan (COZAAR) 50 MG tablet TAKE 1 TABLET BY MOUTH DAILY 30 tablet 10    pantoprazole (PROTONIX) 40 MG tablet TAKE (1) TABLET BY MOUTH EACH MORNING BEFORE BREAKFAST 90 tablet 1    simethicone (MYLICON) 80 MG chewable tablet Take 1 tablet by mouth every 6 hours as needed (cramping) 15 tablet 0    blood glucose test strips (GLUCOSE METER TEST) strip 1 each by In Vitro route 5 times daily As needed. 100 each 3    nitroGLYCERIN (NITROSTAT) 0.4 MG SL tablet DISSOLVE 1 TABLET UNDER THE TONGUE AS NEEDED FOR CHEST PAIN EVERY 5 MINUTES UP TO 3 TIMES.  IF NO RELIEF CALL 911. 25 tablet 10    B Complex-C-Folic Acid (VIRT-CAPS) 1 MG CAPS TK ONE C PO  QD 90 capsule 1    citalopram (CELEXA) 40 MG tablet TAKE (1) TABLET BY MOUTH DAILY 30 tablet 10    insulin aspart (NOVOLOG FLEXPEN) 100 UNIT/ML injection pen Inject 20 Units into the skin 3 times daily (before meals) 15 pen 5    isosorbide mononitrate (IMDUR) 30 MG extended release tablet TAKE 1 TABLET BY MOUTH EVERY DAY 90 tablet 10    ondansetron (ZOFRAN ODT) 4 MG disintegrating tablet Take 1 tablet by mouth every 8 hours as needed for Nausea 60 tablet 0    blood glucose test strips (FREESTYLE LITE) strip Daily As needed. 100 strip 3    vitamin D (ERGOCALCIFEROL) 52321 units CAPS capsule TK 1 C PO WEEKLY  11    flunisolide (NASALIDE) 25 MCG/ACT (0.025%) SOLN Inhale 2 sprays into the lungs every 12 hours 1 Bottle 5    Glucose Blood (BLOOD GLUCOSE TEST STRIPS) STRP TEST 3-4 TIMES DAILY, AS DIRECTED 100 strip 3    albuterol sulfate  (90 Base) MCG/ACT inhaler Inhale 2 puffs into the lungs every 6 hours as needed for Wheezing 1 Inhaler 3    ipratropium-albuterol (DUONEB) 0.5-2.5 (3) MG/3ML SOLN nebulizer solution Inhale 3 mLs into the lungs every 6 hours as needed for Shortness of Breath 360 mL 1    calcium carbonate (TUMS) 500 MG chewable tablet Take 1 tablet by mouth 3 times daily as needed for Heartburn.  aspirin 81 MG chewable tablet Take 1 tablet by mouth daily. (Patient taking differently: Take 81 mg by mouth daily Indications: stopped on 6/25 for surgery ) 30 tablet 2    gabapentin (NEURONTIN) 100 MG capsule TAKE 1 TO 2 CAPSULES BY MOUTH THREE TIMES A  capsule 5    glucose monitoring kit (FREESTYLE) monitoring kit 1 kit by Does not apply route daily 1 kit 0    glucose monitoring kit (FREESTYLE) monitoring kit 1 kit by Does not apply route daily 1 kit 0    Tiotropium Bromide-Olodaterol (STIOLTO RESPIMAT) 2.5-2.5 MCG/ACT AERS Inhale 2 puffs into the lungs daily 2 Inhaler 0    Polyethylene Glycol 3350 GRAN       Blood Glucose Monitoring Suppl ADAM USE AS DIRECTED.  1 Device 0    Alcohol Swabs PADS USE AS DIRECTED 300 each 3       Objective: A/O; lying in bed; dry dressing on R Great Toe    BP (!) 140/56   Pulse 86   Temp 98.2 °F (36.8 °C)   Resp 20   Ht 5' 7\" (1.702 m)   Wt 259 lb 11.2 oz (117.8 kg)   SpO2 99%   BMI 40.68 kg/m²     LABS:  WBC:    Lab Results   Component Value Date    WBC 12.4 08/30/2021     H/H:    Lab Results   Component Value Date    HGB 9.3 08/30/2021    HCT 28.9 08/30/2021     PTT:    Lab Results   Component Value Date    APTT 27.3 07/27/2021   [APTT}  PT/INR:    Lab Results   Component Value Date    PROTIME 10.7 07/27/2021    INR 0.95 07/27/2021     HgBA1c:    Lab Results   Component Value Date    LABA1C 10.0 07/27/2021       Assessment: R Foot - small area with red wound bed, scar tissue noted around the wound  R Great Toe - dried blood, small abrasion    Isaac Risk Score: Isaac Scale Score: 18    Patient Active Problem List   Diagnosis Code    Acute respiratory failure with hypoxia and hypercapnia ([facility]) J96.01, J96.02    Chronic obstructive pulmonary disease ([facility]) J44.9    CAD S/P percutaneous coronary angioplasty I25.10, Z98.61    PVD (peripheral vascular disease) ([facility]) I73.9    Nonischemic cardiomyopathy ([facility]) I42.8    Sleep apnea G47.30    Bicuspid aortic valve Q23.1    Bilateral hilar adenopathy syndrome R59.0    Claudication in peripheral vascular disease ([facility]) I73.9    Essential hypertension I10    Diabetic neuropathy ([facility]) E11.40    Type 2 diabetes, uncontrolled, with neuropathy ([facility]) E11.40, E11.65    Passive smoke exposure Z77.22    Depression with anxiety F41.8    Hematoma T14. 8XXA    Pneumonia of right upper lobe due to infectious organism J18.9    DM (diabetes mellitus), secondary, uncontrolled, w/neurologic complic ([facility] Z95.91, D66.22    Hypertensive heart disease with congestive heart failure with preserved left ventricular function ([facility]) I11.0, I50.30    CHF exacerbation ([facility]) I50.9    Chest pain R07.9    Coronary artery disease involving native coronary artery of native heart without angina pectoris I25.10    Obesity (BMI 30-39. 9) E66.9    ZAINAB on CPAP G47.33, Z99.89    Degeneration of lumbar or lumbosacral intervertebral disc M51.37    Lumbar radiculopathy M54.16    Lumbosacral spondylosis without myelopathy M02.120    Biliary colic S53.68    Symptomatic cholelithiasis K80.20    Gastroparesis due to DM ([facility]) E11.43, K31.84    Angina, class IV ([facility]) I20.9    Dyspnea R06.00    Elevated brain natriuretic peptide (BNP) level R79.89    Dyslipidemia E78.5    Respiratory distress R06.03    Hypoxia R09.02    Chest pressure R07.89    Hypertensive urgency I16.0    Acute on chronic combined systolic and diastolic heart failure ([facility]) I50.43    Ischemic cardiomyopathy I25.5    Chronic renal failure, stage 5 ([facility]) N18.5    Tobacco abuse Z72.0    CKD stage 4 due to type 2 diabetes mellitus ([facility]) E11.22, N18.4    CVA (cerebral vascular accident) ([facility] I63.9    Arterial ischemic stroke, ICA, right, acute ([facility]) I63.231    Type 2 diabetes mellitus without complication, without long-term current use of insulin ([facility]) E11.9    HTN (hypertension), benign I10    ZAINAB (obstructive sleep apnea) G47.33    Diarrhea R19.7    Pleural effusion J90    Chronic anemia D64.9    Nonrheumatic aortic valve stenosis I35.0    Hypervolemia E87.70    Hyperkalemia E87.5    Obesity E66.9    Mucus plugging of bronchi T17.500A    Hemodialysis-associated hypotension I95.3    Left arm pain M79.602    Dizziness R42    ESRD (end stage renal disease) on dialysis ([facility]) N18.6, Z99.2    Hypotension due to drugs I95.2    Acute diastolic CHF (congestive heart failure) ([facility]) I50.31    Neuromuscular disorder ([facility]) G70.9    Acute combined systolic and diastolic CHF, NYHA class 4 ([facility]) I50.41    Renovascular hypertension I15.0    Mixed hyperlipidemia E78.2    Cigarette nicotine dependence in remission F17.211    Acute respiratory failure ([facility]) J96.00    Pulmonary edema J81.1    Fluid overload Q75.91    Diastolic dysfunction S68.66    Anemia of chronic disease D63.8    SOB (shortness of breath) R06.02    Steal syndrome of dialysis vascular access ([facility]) T82.898A    Chronic, continuous use of opioids F11.90    Chronic bronchitis ([facility]) J42    Nasal congestion R09.81    Hypercholesteremia E78.00    Bradycardia R00.1    S/P TAVR (transcatheter aortic valve replacement) Z95.2    Syncope and collapse R55    Atrial fibrillation ([facility]) I48.91    Former smoker Z87.891    Generalized weakness R53.1    DKA, type 1, not at goal 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    Cellulitis and abscess of hand L03.119, L02.519    Type 2 diabetes mellitus with hyperglycemia ([facility]) E11.65    Acute encephalopathy G93.40    Acute hypoxemic respiratory failure ([facility]) J96.01    CHF (congestive heart failure) ([facility]) I50.9       Measurements:  Wound 12/31/20 Foot Right (Active)   Wound Image   08/30/21 1522   Wound Etiology Diabetic 08/30/21 1522   Dressing Status New dressing applied 08/30/21 1522   Wound Cleansed Cleansed with saline 08/30/21 1522   Dressing/Treatment Hydrating gel; Foam 08/30/21 1522   Dressing Change Due 08/31/21 08/30/21 1522   Wound Length (cm) 1 cm 08/30/21 1522   Wound Width (cm) 1 cm 08/30/21 1522   Wound Depth (cm) 0.1 cm 08/30/21 1522   Wound Surface Area (cm^2) 1 cm^2 08/30/21 1522   Change in Wound Size % (l*w) 66.67 08/30/21 1522   Wound Volume (cm^3) 0.1 cm^3 08/30/21 1522   Wound Assessment Pink/red 08/30/21 1522   Drainage Amount Scant 08/30/21 1522   Drainage Description Serosanguinous 08/30/21 1522   Odor None 08/30/21 1522   Liberty-wound Assessment Dry/flaky 08/30/21 1522   Number of days: 241    R Foot:         Wound 08/30/21 Toe (Comment  which one) Right Great Toe (Active)   Wound Image   08/30/21 1524   Wound Etiology Traumatic 08/30/21 1524   Dressing Status New dressing applied 08/30/21 1524   Wound Cleansed Cleansed with saline 08/30/21 1524   Dressing/Treatment Hydrating gel; Foam 08/30/21 1524   Dressing Change Due 08/31/21 08/30/21 1524   Wound Length (cm) 0.3 cm 08/30/21 1524   Wound Width (cm) 0.5 cm 08/30/21 1524   Wound Depth (cm) 0.1 cm 08/30/21 1524   Wound Surface Area (cm^2) 0.15 cm^2 08/30/21 1524   Wound Volume (cm^3) 0.015 cm^3 08/30/21 1524   Wound Assessment Pink/red 08/30/21 1524   Drainage Amount Scant 08/30/21 1524   Drainage Description Sanguinous 08/30/21 1524   Odor None 08/30/21 1524   Liberty-wound Assessment Dry/flaky 08/30/21 1524   Number of days: 0   R Great Toe:      Response to treatment:  Well tolerated by patient. Pain Assessment:  Severity:  0 / 10  Quality of pain: N/A  Wound Pain Timing/Severity: none  Premedicated: No    Plan   Plan of Care: Wound 12/31/20 Foot Right-Dressing/Treatment: Hydrating gel, Foam  Wound 08/30/21 Toe (Comment  which one) Right Great Toe-Dressing/Treatment: Hydrating gel, Foam   Recommendation: R Foot and R Great Toe - clean with NS, apply hydrogel to wound bed, cover with foam and/or bandaid, change daily  Call Wound Care for deterioration [phone]    Specialty Bed Required : No   [] Low Air Loss   [] Pressure Redistribution  [] Fluid Immersion  [] Bariatric  [] Total Pressure Relief  [] Other:     Current Diet: ADULT DIET; Regular; 3 carb choices (45 gm/meal);  Low Sodium (2 gm)  Dietician consult:  No    Discharge Plan:  Placement for patient upon discharge: home with support    Patient appropriate for Outpatient [address] [address]: No    Referrals:  [x]   [] [facility]  [] Supplies  [] Other    Patient/Caregiver Teaching:  Level of patient/caregiver understanding able to:   [x] Indicates understanding       [] Needs reinforcement  [] Unsuccessful      [] Verbal Understanding  [] Demonstrated understanding       [] No evidence of learning  [] Refused teaching         [] N/A       Electronically signed by Mdady Terry RN,

## 2021-08-30 NOTE — PROGRESS NOTES
Dialysis Procedure Note   [email or web address]        PROCEDURE:  Patient seen on hemodialysis at 8/30/21 AM    PHYSICIAN:  Jey Shirley MD    DIAGNOSIS: ESKD    RX:  See dialysis flowsheet for specifics on access, blood flow rate, dialysate baths, duration of dialysis, anticoagulation and other technical information. Physical: Blood pressure (!) 147/70, pulse 86, temperature 97.5 °F (36.4 °C), resp. rate 18, height 5' 7\" (1.702 m), weight 270 lb 1 oz (122.5 kg), SpO2 99 %. COMMENTS:    1. 5L UF as tolerated. Use critline as guidance. Paradise Tariq MD  [facility]  [facility]. [facility]

## 2021-08-30 NOTE — ED NOTES
Sitting up at side of bed. Reporting some improvement in symptoms of breathing. States pain was initially relieved but is now feeling increased pain in hips. Reporting home medications of 7.5 percocet every 4 hours. Called pharmacy as had not received IV atb, requested updated weight completed now awaiting meds.  Will notify MD of request for additional pain meds      Vincent Joseph RN  08/29/21 1743

## 2021-08-31 LAB
ANION GAP SERPL CALCULATED.3IONS-SCNC: 15 MMOL/L (ref 3–16)
BASOPHILS ABSOLUTE: 0.1 K/UL (ref 0–0.2)
BASOPHILS RELATIVE PERCENT: 0.5 %
BUN BLDV-MCNC: 51 MG/DL (ref 7–20)
CALCIUM SERPL-MCNC: 8.5 MG/DL (ref 8.3–10.6)
CHLORIDE BLD-SCNC: 91 MMOL/L (ref 99–110)
CO2: 23 MMOL/L (ref 21–32)
CREAT SERPL-MCNC: 6 MG/DL (ref 0.9–1.3)
EOSINOPHILS ABSOLUTE: 0.2 K/UL (ref 0–0.6)
EOSINOPHILS RELATIVE PERCENT: 1.6 %
GFR AFRICAN AMERICAN: 12
GFR NON-AFRICAN AMERICAN: 10
GLUCOSE BLD-MCNC: 239 MG/DL (ref 70–99)
GLUCOSE BLD-MCNC: 259 MG/DL (ref 70–99)
GLUCOSE BLD-MCNC: 264 MG/DL (ref 70–99)
GLUCOSE BLD-MCNC: 267 MG/DL (ref 70–99)
GLUCOSE BLD-MCNC: 322 MG/DL (ref 70–99)
GLUCOSE BLD-MCNC: 323 MG/DL (ref 70–99)
GLUCOSE BLD-MCNC: 474 MG/DL (ref 70–99)
HCT VFR BLD CALC: 26.4 % (ref 40.5–52.5)
HEMOGLOBIN: 8.5 G/DL (ref 13.5–17.5)
LYMPHOCYTES ABSOLUTE: 1.1 K/UL (ref 1–5.1)
LYMPHOCYTES RELATIVE PERCENT: 8.9 %
MAGNESIUM: 1.9 MG/DL (ref 1.8–2.4)
MCH RBC QN AUTO: 29.7 PG (ref 26–34)
MCHC RBC AUTO-ENTMCNC: 32.4 G/DL (ref 31–36)
MCV RBC AUTO: 91.8 FL (ref 80–100)
MONOCYTES ABSOLUTE: 0.8 K/UL (ref 0–1.3)
MONOCYTES RELATIVE PERCENT: 6.6 %
NEUTROPHILS ABSOLUTE: 10.3 K/UL (ref 1.7–7.7)
NEUTROPHILS RELATIVE PERCENT: 82.4 %
PDW BLD-RTO: 16.2 % (ref 12.4–15.4)
PERFORMED ON: ABNORMAL
PLATELET # BLD: 235 K/UL (ref 135–450)
PMV BLD AUTO: 9.1 FL (ref 5–10.5)
POTASSIUM SERPL-SCNC: 4.6 MMOL/L (ref 3.5–5.1)
RBC # BLD: 2.87 M/UL (ref 4.2–5.9)
SODIUM BLD-SCNC: 129 MMOL/L (ref 136–145)
VANCOMYCIN RANDOM: 11.5 UG/ML
WBC # BLD: 12.5 K/UL (ref 4–11)

## 2021-08-31 PROCEDURE — 2060000000 HC ICU INTERMEDIATE R&B

## 2021-08-31 PROCEDURE — 2580000003 HC RX 258: Performed by: INTERNAL MEDICINE

## 2021-08-31 PROCEDURE — 89220 SPUTUM SPECIMEN COLLECTION: CPT

## 2021-08-31 PROCEDURE — 87070 CULTURE OTHR SPECIMN AEROBIC: CPT

## 2021-08-31 PROCEDURE — 2700000000 HC OXYGEN THERAPY PER DAY

## 2021-08-31 PROCEDURE — 80048 BASIC METABOLIC PNL TOTAL CA: CPT

## 2021-08-31 PROCEDURE — 2580000003 HC RX 258: Performed by: NURSE PRACTITIONER

## 2021-08-31 PROCEDURE — 83735 ASSAY OF MAGNESIUM: CPT

## 2021-08-31 PROCEDURE — 6370000000 HC RX 637 (ALT 250 FOR IP): Performed by: INTERNAL MEDICINE

## 2021-08-31 PROCEDURE — 94761 N-INVAS EAR/PLS OXIMETRY MLT: CPT

## 2021-08-31 PROCEDURE — 6360000002 HC RX W HCPCS: Performed by: INTERNAL MEDICINE

## 2021-08-31 PROCEDURE — 80202 ASSAY OF VANCOMYCIN: CPT

## 2021-08-31 PROCEDURE — 87205 SMEAR GRAM STAIN: CPT

## 2021-08-31 PROCEDURE — 6360000002 HC RX W HCPCS: Performed by: NURSE PRACTITIONER

## 2021-08-31 PROCEDURE — 6370000000 HC RX 637 (ALT 250 FOR IP): Performed by: NURSE PRACTITIONER

## 2021-08-31 PROCEDURE — 2580000003 HC RX 258: Performed by: EMERGENCY MEDICINE

## 2021-08-31 PROCEDURE — 36415 COLL VENOUS BLD VENIPUNCTURE: CPT

## 2021-08-31 PROCEDURE — 94660 CPAP INITIATION&MGMT: CPT

## 2021-08-31 PROCEDURE — 85025 COMPLETE CBC W/AUTO DIFF WBC: CPT

## 2021-08-31 RX ORDER — OXYCODONE AND ACETAMINOPHEN 7.5; 325 MG/1; MG/1
1 TABLET ORAL EVERY 6 HOURS PRN
Status: DISCONTINUED | OUTPATIENT
Start: 2021-08-31 | End: 2021-09-03 | Stop reason: HOSPADM

## 2021-08-31 RX ADMIN — PRAVASTATIN SODIUM 40 MG: 40 TABLET ORAL at 20:45

## 2021-08-31 RX ADMIN — OXYCODONE AND ACETAMINOPHEN 1 TABLET: 7.5; 325 TABLET ORAL at 10:22

## 2021-08-31 RX ADMIN — OXYCODONE AND ACETAMINOPHEN 1 TABLET: 7.5; 325 TABLET ORAL at 19:38

## 2021-08-31 RX ADMIN — INSULIN LISPRO 9 UNITS: 100 INJECTION, SOLUTION INTRAVENOUS; SUBCUTANEOUS at 09:40

## 2021-08-31 RX ADMIN — VANCOMYCIN HYDROCHLORIDE 1250 MG: 10 INJECTION, POWDER, LYOPHILIZED, FOR SOLUTION INTRAVENOUS at 10:25

## 2021-08-31 RX ADMIN — HYDROXYZINE PAMOATE 50 MG: 25 CAPSULE ORAL at 20:45

## 2021-08-31 RX ADMIN — ASPIRIN 81 MG: 81 TABLET, CHEWABLE ORAL at 09:40

## 2021-08-31 RX ADMIN — CEFEPIME HYDROCHLORIDE 1000 MG: 1 INJECTION, POWDER, FOR SOLUTION INTRAMUSCULAR; INTRAVENOUS at 20:36

## 2021-08-31 RX ADMIN — LOSARTAN POTASSIUM 50 MG: 25 TABLET, FILM COATED ORAL at 09:40

## 2021-08-31 RX ADMIN — INSULIN HUMAN 10 UNITS: 100 INJECTION, SOLUTION PARENTERAL at 01:47

## 2021-08-31 RX ADMIN — HYDRALAZINE HYDROCHLORIDE 50 MG: 50 TABLET, FILM COATED ORAL at 06:34

## 2021-08-31 RX ADMIN — SODIUM CHLORIDE, PRESERVATIVE FREE 10 ML: 5 INJECTION INTRAVENOUS at 20:42

## 2021-08-31 RX ADMIN — CITALOPRAM HYDROBROMIDE 40 MG: 20 TABLET ORAL at 09:40

## 2021-08-31 RX ADMIN — INSULIN LISPRO 12 UNITS: 100 INJECTION, SOLUTION INTRAVENOUS; SUBCUTANEOUS at 12:14

## 2021-08-31 RX ADMIN — QUETIAPINE FUMARATE 50 MG: 25 TABLET ORAL at 20:43

## 2021-08-31 RX ADMIN — GABAPENTIN 100 MG: 100 CAPSULE ORAL at 20:43

## 2021-08-31 RX ADMIN — INSULIN LISPRO 5 UNITS: 100 INJECTION, SOLUTION INTRAVENOUS; SUBCUTANEOUS at 20:45

## 2021-08-31 RX ADMIN — ISOSORBIDE MONONITRATE 30 MG: 30 TABLET, EXTENDED RELEASE ORAL at 09:40

## 2021-08-31 RX ADMIN — HYDRALAZINE HYDROCHLORIDE 50 MG: 50 TABLET, FILM COATED ORAL at 21:48

## 2021-08-31 RX ADMIN — HEPARIN SODIUM 5000 UNITS: 5000 INJECTION INTRAVENOUS; SUBCUTANEOUS at 21:48

## 2021-08-31 RX ADMIN — TORSEMIDE 100 MG: 100 TABLET ORAL at 09:40

## 2021-08-31 RX ADMIN — TRAZODONE HYDROCHLORIDE 100 MG: 50 TABLET ORAL at 20:44

## 2021-08-31 RX ADMIN — DILTIAZEM HYDROCHLORIDE 180 MG: 180 CAPSULE, COATED, EXTENDED RELEASE ORAL at 09:40

## 2021-08-31 RX ADMIN — GABAPENTIN 100 MG: 100 CAPSULE ORAL at 09:40

## 2021-08-31 RX ADMIN — HEPARIN SODIUM 5000 UNITS: 5000 INJECTION INTRAVENOUS; SUBCUTANEOUS at 06:34

## 2021-08-31 ASSESSMENT — PAIN SCALES - GENERAL
PAINLEVEL_OUTOF10: 8
PAINLEVEL_OUTOF10: 7
PAINLEVEL_OUTOF10: 9
PAINLEVEL_OUTOF10: 4

## 2021-08-31 NOTE — PROGRESS NOTES
Hospitalist Progress Note      PCP: Tremaine Browning MD    Date of Admission: 8/29/2021    Chief Complaint: Shortness of breath secondary to missing his hemodialysis    Hospital Course: Reviewed H&P    Subjective: Patient has no new complaints. Patient seen with RN at bedside. Instructed RN to wean oxygen as able. Encourage patient to ambulate in escobar. D/w nephrology  -plan for his next hemodialysis session tomorrow and discharge after.       Medications:  Reviewed    Infusion Medications    sodium chloride      dextrose       Scheduled Medications    cefepime  1,000 mg IntraVENous Q24H    QUEtiapine  50 mg Oral Nightly    epoetin siddharth-epbx  1,600 Units IntraVENous Q MWF    Calcium Acetate (Phos Binder)  667 mg Oral TID WC    doxercalciferol  3 mcg IntraVENous Q MWF    sodium chloride flush  5-40 mL IntraVENous 2 times per day    aspirin  81 mg Oral Daily    citalopram  40 mg Oral Daily    dilTIAZem  180 mg Oral Daily    gabapentin  100 mg Oral TID    hydrALAZINE  50 mg Oral 3 times per day    isosorbide mononitrate  30 mg Oral Daily    losartan  50 mg Oral Daily    pravastatin  40 mg Oral Nightly    torsemide  100 mg Oral Daily    traZODone  100 mg Oral Nightly    hydrOXYzine  50 mg Oral Nightly    insulin lispro  0-18 Units SubCUTAneous TID WC    insulin lispro  0-9 Units SubCUTAneous Nightly    heparin (porcine)  5,000 Units SubCUTAneous 3 times per day    vancomycin (VANCOCIN) intermittent dosing (placeholder)   Other RX Placeholder     PRN Meds: oxyCODONE-acetaminophen, heparin (porcine), sodium chloride, nitroGLYCERIN, sodium chloride flush, albuterol, albuterol sulfate HFA, ipratropium-albuterol, glucose, dextrose, glucagon (rDNA), dextrose, ondansetron **OR** ondansetron, polyethylene glycol, acetaminophen **OR** acetaminophen      Intake/Output Summary (Last 24 hours) at 8/31/2021 1417  Last data filed at 8/31/2021 0230  Gross per 24 hour   Intake 1500 ml   Output 850 ml   Net 650 ml       Physical Exam Performed:  BP (!) 96/58   Pulse 78   Temp 97.5 °F (36.4 °C) (Oral)   Resp 16   Ht 5' 7\" (1.702 m)   Wt 272 lb 9.6 oz (123.7 kg)   SpO2 97%   BMI 42.70 kg/m²     General appearance:  Pleasant, obese male in no apparent distress, appears stated age and cooperative. HEENT: Pupils equal, round, and reactive to light. Extra ocular muscles intact. Conjunctivae/corneas clear. Neck: Supple, with full range of motion. No jugular venous distention. Trachea midline. Respiratory:  Normal respiratory effort. Diminished, Clear to auscultation, bilaterally without Rales/Wheezes/Rhonchi. Cardiovascular:  Regular rate and rhythm with normal S1/S2 without murmurs, rubs or gallops. Abdomen: Soft, obese, round non-tender, non-distended with normal bowel sounds. Musculoskeletal:  No clubbing, cyanosis or edema bilaterally. Full range of motion without deformity. Skin: Skin color, texture, turgor normal.  No rashes or lesions. Neurologic:  Neurovascularly intact without any focal sensory/motor deficits. Cranial nerves: II-XII intact, grossly non-focal.  Psychiatric:  Alert and oriented, thought content appropriate, normal insight  Capillary Refill: Brisk,3 seconds, normal  Peripheral Pulses: +2 palpable, equal bilaterally       Labs:   Recent Labs     08/29/21  1834 08/30/21  0257 08/31/21  0704   WBC 16.4* 12.4* 12.5*   HGB 11.0* 9.3* 8.5*   HCT 34.3* 28.9* 26.4*    257 235     Recent Labs     08/29/21  1834 08/30/21  0257 08/31/21  0704   * 129* 129*   K 4.9 5.1 4.6   CL 93* 92* 91*   CO2 21 20* 23   BUN 72* 79* 51*   CREATININE 7.4* 8.3* 6.0*   CALCIUM 8.7 8.6 8.5     Recent Labs     08/29/21  1834   AST 13*   ALT 17   BILITOT <0.2   ALKPHOS 92     No results for input(s): INR in the last 72 hours. Recent Labs     08/29/21  1834 08/29/21  2353 08/30/21  0257   TROPONINI 0.13* 0.15* 0.15*     Radiology:  XR CHEST PORTABLE   Final Result   Perihilar and right basilar opacities. Correlate with presentation for   pulmonary edema. Correlate clinically to exclude underlying pneumonia. Assessment/Plan:    Active Hospital Problems    Diagnosis Date Noted    Acute on chronic combined systolic and diastolic heart failure (HCC) [I50.43]      Priority: High    Type 2 diabetes, uncontrolled, with neuropathy ([email or web address] [E11.40, E11.65] 03/04/2014     Priority: High    Essential hypertension [I10] 11/14/2013     Priority: Medium    Chronic obstructive pulmonary disease ([facility] [J44.9] 05/14/2013     Priority: Medium    Obesity [E66.9] 11/03/2020    Acute respiratory failure ([email or web address].) [J96.00] 11/09/2018    ZAINAB on CPAP [G47.33, Z99.89] 02/11/2016    Coronary artery disease involving native coronary artery of native heart without angina pectoris [I25.10]      Sepsis in patient with acute hypoxemic respiratory failure likley 2/2 pneumonia, WBC 16.4, , lactic acid 2.8 on admission. SPO2 80% on  RA, now on CPAP  -required CPAP on admission ; clinically sepsis syndrome resolved. - hypoxemic respiratory failure multipfactoral with COPD, CHF, restrictive disease caused by obesity and fluid overload d/t missing dialysis  -CXR revealed:  Perihilar and right basilar opacities. Correlate with presentation for pulmonary edema.   Correlate clinically to exclude underlying pneumonia  -30ml/kg IVF not given d/t ESRD and respiratory status.  -vancomycin and cefepime given in ED and continued 8/29/2021  -blood cultures ordered in ED -no growth to date ; sputum cultures pending  -fentanyl given in ED  -duoneb given in ED  -solumedrol given in ED  -continue prn nebulizers  -continue prn inhalers  -IS  -encourage coughing and deep breathing  -acapella     Acute on chronic combined systolic and diastolic heart failure  -CXR revealed: correlation with presentation for pulmonary edema ; improved with HD session on 8/30/2021  -ProBNP 38,040  -strict I&O  -daily weights  -continue demadex  -ECHO 1/5/2021: EF 55%     COPD  -continue CPAP for now  -oxygen therapy protocol  -continue prn inhalers and nebulizers     ESRD, HD M-W-F  - Missed last HD session . -bmp in am  -nephrology consult -  Had his HD on 8/30      Elevated troponin, 0.13  -likely 2/2 demand ischemia r/t ESRD  -  Troponin plateaued at 9.75   -tele monitoring     Morbid obesity  With Body mass index is 05.4 kg/m². Complicating assessment and treatment. Placing patient at risk for multiple co-morbidities as well as early death and contributing to the patient's presentation. Counseled on weight loss     DM2 with neuropathy, uncontrolled  -  -hemglobin a1c 10% on 7/27/2021  -hold home metformin  -hdssi  -poct ac/hs  -hypoglycemia protocol  -carb control diet  -continue gabapentin     Essential HTN in setting of known CAD  -continue hydralazine and losartan  -continue asa  -continue imdur     ZAINAB   -CPAP at night when not on bipap     Chronic normocytic anemia, 11.0/34.3 on admission  -no s/s of bleeding at this time  -cbc in am     Anxiety depression   -mood appears stable at this time  -continue home medications     DVT Prophylaxis: Heparin subcu  Diet: ADULT DIET; Regular; 3 carb choices (45 gm/meal); Low Sodium (2 gm)  Code Status: Full Code    PT/OT Eval Status: Ambulatory    Dispo -likely tomorrow after His HD      The note was completed using Dragon -speech recognition software & EMR  . Every effort was made to ensure accuracy; however, inadvertent computerized transcription errors may be present.     Patrice Agarwal MD

## 2021-08-31 NOTE — PROGRESS NOTES
Nephrology Progress Note   [facility]. [facility]      This patient is a 46year old male whom we are following for ESKD. Subjective: The patient was seen and examined. Net UF of 4L in dialysis yesterday, had cramping towards the end of treatment. SOB is better. Family History: No family at bedside  ROS: No nausea or vomiting      Vitals:  BP 98/65   Pulse 73   Temp 97.7 °F (36.5 °C) (Axillary)   Resp 18   Ht 5' 7\" (1.702 m)   Wt 272 lb 9.6 oz (123.7 kg)   SpO2 100%   BMI 42.70 kg/m²   I/O last 3 completed shifts: In: 2000 [P.O.:1500]  Out: 5700 [Urine:1200]  No intake/output data recorded. Physical Exam:  Physical Exam  Vitals reviewed. Constitutional:       General: He is not in acute distress. Appearance: Normal appearance. HENT:      Head: Normocephalic and atraumatic. Eyes:      General: No scleral icterus. Conjunctiva/sclera: Conjunctivae normal.   Cardiovascular:      Rate and Rhythm: Normal rate. Heart sounds: No friction rub. Pulmonary:      Effort: Pulmonary effort is normal. No respiratory distress. Breath sounds: Wheezing present. Abdominal:      General: Bowel sounds are normal. There is no distension. Tenderness: There is no abdominal tenderness. Musculoskeletal:      Right lower leg: Edema present. Left lower leg: Edema present. Neurological:      Mental Status: He is alert.        Access: [facility] T[facility]      Medications:   cefepime  1,000 mg IntraVENous Q24H    QUEtiapine  50 mg Oral Nightly    epoetin siddharth-epbx  1,600 Units IntraVENous Q MWF    Calcium Acetate (Phos Binder)  667 mg Oral TID     doxercalciferol  3 mcg IntraVENous Q MWF    sodium chloride flush  5-40 mL IntraVENous 2 times per day    aspirin  81 mg Oral Daily    citalopram  40 mg Oral Daily    dilTIAZem  180 mg Oral Daily    gabapentin  100 mg Oral TID    hydrALAZINE  50 mg Oral 3 times per day    isosorbide mononitrate  30 mg Oral Daily    losartan  50 mg Oral Daily    pravastatin  40 mg Oral Nightly    torsemide  100 mg Oral Daily    traZODone  100 mg Oral Nightly    hydrOXYzine  50 mg Oral Nightly    insulin lispro  0-18 Units SubCUTAneous TID WC    insulin lispro  0-9 Units SubCUTAneous Nightly    heparin (porcine)  5,000 Units SubCUTAneous 3 times per day    vancomycin (VANCOCIN) intermittent dosing (placeholder)   Other RX Placeholder         Labs:  Recent Labs     08/29/21  1834 08/30/21  0257   WBC 16.4* 12.4*   HGB 11.0* 9.3*   HCT 34.3* 28.9*   MCV 96.4 95.6    257     Recent Labs     08/29/21  1834 08/29/21  1834 08/29/21  2353 08/30/21  0257 08/31/21  0704   *  --   --  129* 129*   K 4.9  --   --  5.1 4.6   CL 93*  --   --  92* 91*   CO2 21  --   --  20* 23   GLUCOSE 525*   < > 628* 537* 267*   MG  --   --   --  2.00 1.90   BUN 72*  --   --  79* 51*   CREATININE 7.4*  --   --  8.3* 6.0*   LABGLOM 8*  --   --  7* 10*   GFRAA 9*  --   --  8* 12*    < > = values in this interval not displayed. Assessment/Plan:    ESKD. - On HD MWF at [facility]. - Access: [facility]  - Outpatient TW of 118kg. Able to reach TW yesterday. - Will lower TW to 117kg tomorrow if able.     Hyponatremia.  - From fluid overload and hyperglycemia. - UF with HD.  - Sodium and fluid restriction.     Anemia.  - Continue EPO with HD (1600 units).     Metabolic Acidosis. - From missed HD.   - Improved.     CKD-MBD.  - Continue Hectorol and Calcium acetate.     SOB. - From fluid overload and possible pneumonia. - Fluid removal with HD.  - Antibiotics per primary service. Leukocytosis improving. Please do not hesitate to contact me at [phone] if with questions. Thank you! Christoph Bautista MD  [facility]  8/31/2021

## 2021-08-31 NOTE — PROGRESS NOTES
08/31/21 0012   NIV Type   $NIV $Daily Charge   Skin Protection for O2 Device Yes   Location Nose   Equipment Type v60   Mode CPAP   Mask Type Full face mask   Settings/Measurements   CPAP/EPAP 14 cmH2O   Resp 17   FiO2  50 %  (decreased to 40%)   Vt Exhaled 582 mL   Minute Volume 9.7 Liters   Mask Leak (lpm) 19 lpm   Comfort Level Good   Using Accessory Muscles No   SpO2 100   Alarm Settings   Alarms On Y   Press Low Alarm 6 cmH2O   High Pressure Alarm 30 cmH2O   Apnea (secs) 20 secs   Resp Rate Low Alarm 6   High Respiratory Rate 40 br/min

## 2021-08-31 NOTE — PROGRESS NOTES
08/31/21 0433   NIV Type   Equipment Type v60   Mode CPAP   Mask Type Full face mask   Mask Size Large   Settings/Measurements   CPAP/EPAP 14 cmH2O   Resp 16   FiO2  40 %   Vt Exhaled 580 mL   Minute Volume 9.6 Liters   Mask Leak (lpm) 11 lpm   Comfort Level Good   Using Accessory Muscles No   SpO2 98   Alarm Settings   Alarms On Y   Press Low Alarm 6 cmH2O   High Pressure Alarm 30 cmH2O   Apnea (secs) 20 secs   Resp Rate Low Alarm 6   High Respiratory Rate 40 br/min

## 2021-08-31 NOTE — PROGRESS NOTES
08/30/21 2154   NIV Type   Skin Protection for O2 Device Yes   Location Nose   NIV Started/Stopped On   Equipment Type v60   Mode CPAP   Mask Type Full face mask   Settings/Measurements   CPAP/EPAP 14 cmH2O   Resp 15   FiO2  50 %   Vt Exhaled 514 mL   Minute Volume 7.7 Liters   Mask Leak (lpm) 26 lpm   Using Accessory Muscles No

## 2021-08-31 NOTE — CARE COORDINATION
CM unable to obtain later chair time or device that would monitor BS on the back of the arm, preventing finger stick. CM will watch for home O2 needs.   Miguelito Estrella RN

## 2021-09-01 ENCOUNTER — APPOINTMENT (OUTPATIENT)
Dept: GENERAL RADIOLOGY | Age: 51
DRG: 871 | End: 2021-09-01
Payer: MEDICARE

## 2021-09-01 LAB
ANION GAP SERPL CALCULATED.3IONS-SCNC: 17 MMOL/L (ref 3–16)
BANDED NEUTROPHILS RELATIVE PERCENT: 1 % (ref 0–7)
BASOPHILS ABSOLUTE: 0 K/UL (ref 0–0.2)
BASOPHILS RELATIVE PERCENT: 0 %
BUN BLDV-MCNC: 61 MG/DL (ref 7–20)
CALCIUM SERPL-MCNC: 8 MG/DL (ref 8.3–10.6)
CHLORIDE BLD-SCNC: 91 MMOL/L (ref 99–110)
CO2: 20 MMOL/L (ref 21–32)
CREAT SERPL-MCNC: 6.9 MG/DL (ref 0.9–1.3)
EOSINOPHILS ABSOLUTE: 0.7 K/UL (ref 0–0.6)
EOSINOPHILS RELATIVE PERCENT: 7 %
GFR AFRICAN AMERICAN: 10
GFR NON-AFRICAN AMERICAN: 8
GLUCOSE BLD-MCNC: 202 MG/DL (ref 70–99)
GLUCOSE BLD-MCNC: 215 MG/DL (ref 70–99)
GLUCOSE BLD-MCNC: 272 MG/DL (ref 70–99)
GLUCOSE BLD-MCNC: 344 MG/DL (ref 70–99)
HCT VFR BLD CALC: 27.8 % (ref 40.5–52.5)
HEMOGLOBIN: 9.4 G/DL (ref 13.5–17.5)
LYMPHOCYTES ABSOLUTE: 1.5 K/UL (ref 1–5.1)
LYMPHOCYTES RELATIVE PERCENT: 15 %
MACROCYTES: ABNORMAL
MAGNESIUM: 1.9 MG/DL (ref 1.8–2.4)
MCH RBC QN AUTO: 30.8 PG (ref 26–34)
MCHC RBC AUTO-ENTMCNC: 33.7 G/DL (ref 31–36)
MCV RBC AUTO: 91.5 FL (ref 80–100)
MICROCYTES: ABNORMAL
MONOCYTES ABSOLUTE: 0.4 K/UL (ref 0–1.3)
MONOCYTES RELATIVE PERCENT: 4 %
NEUTROPHILS ABSOLUTE: 7.5 K/UL (ref 1.7–7.7)
NEUTROPHILS RELATIVE PERCENT: 73 %
OVALOCYTES: ABNORMAL
PDW BLD-RTO: 16.2 % (ref 12.4–15.4)
PERFORMED ON: ABNORMAL
PLATELET # BLD: 243 K/UL (ref 135–450)
PLATELET SLIDE REVIEW: ADEQUATE
PMV BLD AUTO: 8.8 FL (ref 5–10.5)
POLYCHROMASIA: ABNORMAL
POTASSIUM SERPL-SCNC: 4.7 MMOL/L (ref 3.5–5.1)
PRO-BNP: ABNORMAL PG/ML (ref 0–124)
RBC # BLD: 3.04 M/UL (ref 4.2–5.9)
SLIDE REVIEW: ABNORMAL
SODIUM BLD-SCNC: 128 MMOL/L (ref 136–145)
STOMATOCYTES: ABNORMAL
VANCOMYCIN RANDOM: 19.4 UG/ML
WBC # BLD: 10.1 K/UL (ref 4–11)

## 2021-09-01 PROCEDURE — 2580000003 HC RX 258: Performed by: EMERGENCY MEDICINE

## 2021-09-01 PROCEDURE — 6370000000 HC RX 637 (ALT 250 FOR IP): Performed by: INTERNAL MEDICINE

## 2021-09-01 PROCEDURE — 71045 X-RAY EXAM CHEST 1 VIEW: CPT

## 2021-09-01 PROCEDURE — 83735 ASSAY OF MAGNESIUM: CPT

## 2021-09-01 PROCEDURE — 94660 CPAP INITIATION&MGMT: CPT

## 2021-09-01 PROCEDURE — 6370000000 HC RX 637 (ALT 250 FOR IP): Performed by: NURSE PRACTITIONER

## 2021-09-01 PROCEDURE — 94761 N-INVAS EAR/PLS OXIMETRY MLT: CPT

## 2021-09-01 PROCEDURE — 6360000002 HC RX W HCPCS: Performed by: NURSE PRACTITIONER

## 2021-09-01 PROCEDURE — 83880 ASSAY OF NATRIURETIC PEPTIDE: CPT

## 2021-09-01 PROCEDURE — 80048 BASIC METABOLIC PNL TOTAL CA: CPT

## 2021-09-01 PROCEDURE — 90935 HEMODIALYSIS ONE EVALUATION: CPT

## 2021-09-01 PROCEDURE — 80202 ASSAY OF VANCOMYCIN: CPT

## 2021-09-01 PROCEDURE — 6360000002 HC RX W HCPCS: Performed by: INTERNAL MEDICINE

## 2021-09-01 PROCEDURE — 2700000000 HC OXYGEN THERAPY PER DAY

## 2021-09-01 PROCEDURE — 85025 COMPLETE CBC W/AUTO DIFF WBC: CPT

## 2021-09-01 PROCEDURE — 2060000000 HC ICU INTERMEDIATE R&B

## 2021-09-01 PROCEDURE — 2500000003 HC RX 250 WO HCPCS: Performed by: INTERNAL MEDICINE

## 2021-09-01 PROCEDURE — 36415 COLL VENOUS BLD VENIPUNCTURE: CPT

## 2021-09-01 RX ORDER — AMOXICILLIN AND CLAVULANATE POTASSIUM 875; 125 MG/1; MG/1
1 TABLET, FILM COATED ORAL EVERY 12 HOURS SCHEDULED
Status: DISCONTINUED | OUTPATIENT
Start: 2021-09-02 | End: 2021-09-03 | Stop reason: HOSPADM

## 2021-09-01 RX ORDER — ALBUMIN (HUMAN) 12.5 G/50ML
12.5 SOLUTION INTRAVENOUS PRN
Status: DISCONTINUED | OUTPATIENT
Start: 2021-09-01 | End: 2021-09-03 | Stop reason: HOSPADM

## 2021-09-01 RX ORDER — CHOLECALCIFEROL (VITAMIN D3) 10 MCG
1 TABLET ORAL DAILY
Status: DISCONTINUED | OUTPATIENT
Start: 2021-09-01 | End: 2021-09-03 | Stop reason: HOSPADM

## 2021-09-01 RX ADMIN — DOXERCALCIFEROL 3 MCG: 4 INJECTION, SOLUTION INTRAVENOUS at 12:59

## 2021-09-01 RX ADMIN — OXYCODONE AND ACETAMINOPHEN 1 TABLET: 7.5; 325 TABLET ORAL at 21:00

## 2021-09-01 RX ADMIN — TRAZODONE HYDROCHLORIDE 100 MG: 50 TABLET ORAL at 20:56

## 2021-09-01 RX ADMIN — OXYCODONE AND ACETAMINOPHEN 1 TABLET: 7.5; 325 TABLET ORAL at 14:19

## 2021-09-01 RX ADMIN — HEPARIN SODIUM 5000 UNITS: 5000 INJECTION INTRAVENOUS; SUBCUTANEOUS at 14:20

## 2021-09-01 RX ADMIN — EPOETIN ALFA-EPBX 1600 UNITS: 2000 INJECTION, SOLUTION INTRAVENOUS; SUBCUTANEOUS at 10:42

## 2021-09-01 RX ADMIN — INSULIN LISPRO 3 UNITS: 100 INJECTION, SOLUTION INTRAVENOUS; SUBCUTANEOUS at 20:56

## 2021-09-01 RX ADMIN — HEPARIN SODIUM 5000 UNITS: 5000 INJECTION INTRAVENOUS; SUBCUTANEOUS at 20:56

## 2021-09-01 RX ADMIN — PRAVASTATIN SODIUM 40 MG: 40 TABLET ORAL at 20:56

## 2021-09-01 RX ADMIN — INSULIN LISPRO 15 UNITS: 100 INJECTION, SOLUTION INTRAVENOUS; SUBCUTANEOUS at 17:34

## 2021-09-01 RX ADMIN — HEPARIN SODIUM 5000 UNITS: 5000 INJECTION INTRAVENOUS; SUBCUTANEOUS at 05:42

## 2021-09-01 RX ADMIN — INSULIN LISPRO 9 UNITS: 100 INJECTION, SOLUTION INTRAVENOUS; SUBCUTANEOUS at 12:57

## 2021-09-01 RX ADMIN — HYDRALAZINE HYDROCHLORIDE 50 MG: 50 TABLET, FILM COATED ORAL at 14:18

## 2021-09-01 RX ADMIN — GABAPENTIN 100 MG: 100 CAPSULE ORAL at 14:18

## 2021-09-01 RX ADMIN — CALCIUM ACETATE 667 MG: 667 CAPSULE ORAL at 12:57

## 2021-09-01 RX ADMIN — SODIUM CHLORIDE, PRESERVATIVE FREE 10 ML: 5 INJECTION INTRAVENOUS at 21:01

## 2021-09-01 RX ADMIN — HEPARIN SODIUM 4000 UNITS: 1000 INJECTION INTRAVENOUS; SUBCUTANEOUS at 11:38

## 2021-09-01 RX ADMIN — NEPHROCAP 1 MG: 1 CAP ORAL at 14:18

## 2021-09-01 RX ADMIN — HYDROXYZINE PAMOATE 50 MG: 25 CAPSULE ORAL at 20:55

## 2021-09-01 RX ADMIN — QUETIAPINE FUMARATE 50 MG: 25 TABLET ORAL at 20:56

## 2021-09-01 RX ADMIN — CALCIUM ACETATE 667 MG: 667 CAPSULE ORAL at 17:34

## 2021-09-01 RX ADMIN — HYDRALAZINE HYDROCHLORIDE 50 MG: 50 TABLET, FILM COATED ORAL at 20:55

## 2021-09-01 RX ADMIN — GABAPENTIN 100 MG: 100 CAPSULE ORAL at 20:56

## 2021-09-01 ASSESSMENT — PAIN SCALES - GENERAL
PAINLEVEL_OUTOF10: 3
PAINLEVEL_OUTOF10: 8
PAINLEVEL_OUTOF10: 8
PAINLEVEL_OUTOF10: 9

## 2021-09-01 ASSESSMENT — PAIN DESCRIPTION - PAIN TYPE
TYPE: CHRONIC PAIN

## 2021-09-01 ASSESSMENT — PAIN DESCRIPTION - LOCATION
LOCATION: BACK;HIP
LOCATION: BACK
LOCATION: BACK;HIP

## 2021-09-01 ASSESSMENT — PAIN DESCRIPTION - PROGRESSION: CLINICAL_PROGRESSION: NOT CHANGED

## 2021-09-01 ASSESSMENT — PAIN DESCRIPTION - ONSET: ONSET: ON-GOING

## 2021-09-01 NOTE — PROGRESS NOTES
08/31/21 2209   NIV Type   NIV Started/Stopped On   Equipment Type v60   Mode CPAP   Mask Type Full face mask   Mask Size Large   Bonnet size Large   Settings/Measurements   CPAP/EPAP 14 cmH2O   Resp 17   FiO2  40 %   Vt Exhaled 801 mL   Mask Leak (lpm) 14 lpm   Comfort Level Good   Using Accessory Muscles No   SpO2 100   Breath Sounds   Right Upper Lobe Diminished   Right Middle Lobe Diminished   Right Lower Lobe Diminished   Left Upper Lobe Diminished   Left Lower Lobe Diminished   Alarm Settings   Alarms On Y   Press Low Alarm 6 cmH2O   High Pressure Alarm 30 cmH2O   Resp Rate Low Alarm 6   High Respiratory Rate 40 br/min

## 2021-09-01 NOTE — PROGRESS NOTES
08/31/21 2337   NIV Type   NIV Started/Stopped On   Equipment Type v60   Mode CPAP   Mask Type Full face mask   Mask Size Large   Bonnet size Large   Settings/Measurements   CPAP/EPAP 14 cmH2O   FiO2  40 %   Vt Exhaled 605 mL   Mask Leak (lpm) 10 lpm   Comfort Level Good   Using Accessory Muscles No   Alarm Settings   Alarms On Y   Press Low Alarm 6 cmH2O   High Pressure Alarm 30 cmH2O   Resp Rate Low Alarm 6   High Respiratory Rate 40 br/min

## 2021-09-01 NOTE — ACP (ADVANCE CARE PLANNING)
Advance Care Planning     General Advance Care Planning (ACP) Conversation    Date of Conversation: 8/29/2021  Conducted with: Patient with Decision Making Capacity    Healthcare Decision Maker:    Primary Decision Maker: Crystal Ann - Spouse - [phone]  Click here to complete [address] including selection of the Healthcare Decision Maker Relationship (ie \"Primary\"). Today we documented Decision Maker(s) consistent with Legal Next of Kin hierarchy.     Content/Action Overview:  Has NO ACP documents/care preferences - requested patient complete ACP documents  Reviewed DNR/DNI and patient elects Full Code (Attempt Resuscitation)        Length of Voluntary ACP Conversation in minutes:  <16 minutes (Non-Billable)    Kari Summers RN

## 2021-09-01 NOTE — PROGRESS NOTES
Nephrology Progress Note   [facility]. [facility]      This patient is a 46year old male whom we are following for ESKD. Subjective: The patient was seen and examined; he feels well today with no CP, SOB, nausea or vomiting. ROS: No fever or chills. Social: No family at bedside. Vitals:  /72   Pulse 86   Temp 97.7 °F (36.5 °C)   Resp 20   Ht 5' 7\" (1.702 m)   Wt 260 lb 12.9 oz (118.3 kg)   SpO2 99%   BMI 40.85 kg/m²   I/O last 3 completed shifts: In: 560 [P.O.:560]  Out: 150 [Urine:150]  I/O this shift: In: 400   Out: 5392     Physical Exam:  Physical Exam  Vitals reviewed. Constitutional:       General: He is not in acute distress. Appearance: Normal appearance. HENT:      Head: Normocephalic and atraumatic. Eyes:      General: No scleral icterus. Conjunctiva/sclera: Conjunctivae normal.   Cardiovascular:      Rate and Rhythm: Normal rate. Heart sounds: No friction rub. Pulmonary:      Effort: Pulmonary effort is normal. No respiratory distress. Breath sounds: Wheezing present. Abdominal:      General: Bowel sounds are normal. There is no distension. Tenderness: There is no abdominal tenderness. Musculoskeletal:      Right lower leg: Edema present. Left lower leg: Edema present. Neurological:      Mental Status: He is alert.        Access: [facility] T[facility]    Medications:   vancomycin  500 mg IntraVENous Once    cefepime  1,000 mg IntraVENous Q24H    QUEtiapine  50 mg Oral Nightly    epoetin siddharth-epbx  1,600 Units IntraVENous Q MWF    Calcium Acetate (Phos Binder)  667 mg Oral TID     doxercalciferol  3 mcg IntraVENous Q MWF    sodium chloride flush  5-40 mL IntraVENous 2 times per day    aspirin  81 mg Oral Daily    citalopram  40 mg Oral Daily    dilTIAZem  180 mg Oral Daily    gabapentin  100 mg Oral TID    hydrALAZINE  50 mg Oral 3 times per day    isosorbide mononitrate  30 mg Oral Daily    losartan  50 mg Oral Daily    pravastatin  40 mg Oral Nightly    torsemide  100 mg Oral Daily    traZODone  100 mg Oral Nightly    hydrOXYzine  50 mg Oral Nightly    insulin lispro  0-18 Units SubCUTAneous TID WC    insulin lispro  0-9 Units SubCUTAneous Nightly    heparin (porcine)  5,000 Units SubCUTAneous 3 times per day    vancomycin (VANCOCIN) intermittent dosing (placeholder)   Other RX Placeholder         Labs:  Recent Labs     08/30/21 0257 08/31/21  0704 09/01/21  0800   WBC 12.4* 12.5* 10.1   HGB 9.3* 8.5* 9.4*   HCT 28.9* 26.4* 27.8*   MCV 95.6 91.8 91.5    235 243     Recent Labs     08/30/21 0257 08/31/21 0704 09/01/21  0800   * 129* 128*   K 5.1 4.6 4.7   CL 92* 91* 91*   CO2 20* 23 20*   GLUCOSE 537* 267* 215*   MG 2.00 1.90 1.90   BUN 79* 51* 61*   CREATININE 8.3* 6.0* 6.9*   LABGLOM 7* 10* 8*   GFRAA 8* 12* 10*         Assessment/Plan:      ESKD. - On HD MWF at [facility]. - Access: [facility]  - Change TW to 118 kgs.     Hyponatremia.  - From fluid overload and hyperglycemia. - UF with HD.  - Sodium and fluid restriction.     Anemia.  - Continue EPO with HD (1600 units).     Metabolic Acidosis. - From missed HD.   - Improved.     CKD-MBD.  - Continue Hectorol and Calcium acetate.     SOB. - From fluid overload and possible pneumonia. - Fluid removal with HD.  - Antibiotics per primary service. Leukocytosis improving.

## 2021-09-01 NOTE — PROGRESS NOTES
Hospitalist Progress Note      PCP: Tara Parmar MD    Date of Admission: 8/29/2021    Chief Complaint: Shortness of breath secondary to missing his hemodialysis    Hospital Course: Reviewed H&P    Subjective: Patient currently on the floor for his HD. D/w RN-  no acute events overnight. Requested repeat chest x-ray following hemodialysis. Chest x-ray consistent with ongoing pulmonary edema and given elevated proBNP. Continue current care. Leukocytosis resolved. We will transition to p.o. Augmentin. Cultures thus far negative. Continue Demadex. Net negative balance of 8.9 L thus far. Has no new complaints. RN to wean oxygen as able. Encourage patient to ambulate in escobar.       Medications:  Reviewed    Infusion Medications    sodium chloride      dextrose       Scheduled Medications    b complex-C-folic acid  1 capsule Oral Daily    [START ON 9/2/2021] amoxicillin-clavulanate  1 tablet Oral 2 times per day    QUEtiapine  50 mg Oral Nightly    epoetin siddharth-epbx  1,600 Units IntraVENous Q MWF    Calcium Acetate (Phos Binder)  667 mg Oral TID WC    doxercalciferol  3 mcg IntraVENous Q MWF    sodium chloride flush  5-40 mL IntraVENous 2 times per day    aspirin  81 mg Oral Daily    citalopram  40 mg Oral Daily    dilTIAZem  180 mg Oral Daily    gabapentin  100 mg Oral TID    hydrALAZINE  50 mg Oral 3 times per day    isosorbide mononitrate  30 mg Oral Daily    losartan  50 mg Oral Daily    pravastatin  40 mg Oral Nightly    torsemide  100 mg Oral Daily    traZODone  100 mg Oral Nightly    hydrOXYzine  50 mg Oral Nightly    insulin lispro  0-18 Units SubCUTAneous TID WC    insulin lispro  0-9 Units SubCUTAneous Nightly    heparin (porcine)  5,000 Units SubCUTAneous 3 times per day     PRN Meds: albumin human, oxyCODONE-acetaminophen, heparin (porcine), sodium chloride, nitroGLYCERIN, sodium chloride flush, albuterol, albuterol sulfate HFA, ipratropium-albuterol, glucose, dextrose, glucagon (rDNA), dextrose, ondansetron **OR** ondansetron, polyethylene glycol, acetaminophen **OR** acetaminophen      Intake/Output Summary (Last 24 hours) at 9/1/2021 1625  Last data filed at 9/1/2021 1137  Gross per 24 hour   Intake 960 ml   Output 5542 ml   Net -4582 ml       Physical Exam Performed:  BP (!) 146/86   Pulse 93   Temp 98.1 °F (36.7 °C) (Oral)   Resp 14   Ht 5' 7\" (1.702 m)   Wt 260 lb 12.9 oz (118.3 kg)   SpO2 99%   BMI 40.85 kg/m²     General appearance:  Pleasant, obese male in no apparent distress, appears stated age and cooperative. HEENT: Pupils equal, round, and reactive to light. Extra ocular muscles intact. Conjunctivae/corneas clear. Neck: Supple, with full range of motion. No jugular venous distention. Trachea midline. Respiratory:  Normal respiratory effort. Diminished, Clear to auscultation, bilaterally without Rales/Wheezes/Rhonchi. Cardiovascular:  Regular rate and rhythm with normal S1/S2 without murmurs, rubs or gallops. Abdomen: Soft, obese, round non-tender, non-distended with normal bowel sounds. Musculoskeletal:  No clubbing, cyanosis or edema bilaterally. Full range of motion without deformity. Skin: Skin color, texture, turgor normal.  No rashes or lesions. Neurologic:  Neurovascularly intact without any focal sensory/motor deficits.  Cranial nerves: II-XII intact, grossly non-focal.  Psychiatric:  Alert and oriented, thought content appropriate, normal insight  Capillary Refill: Brisk,3 seconds, normal  Peripheral Pulses: +2 palpable, equal bilaterally       Labs:   Recent Labs     08/30/21 0257 08/31/21  0704 09/01/21  0800   WBC 12.4* 12.5* 10.1   HGB 9.3* 8.5* 9.4*   HCT 28.9* 26.4* 27.8*    235 243     Recent Labs     08/30/21  0257 08/31/21  0704 09/01/21  0800   * 129* 128*   K 5.1 4.6 4.7   CL 92* 91* 91*   CO2 20* 23 20*   BUN 79* 51* 61*   CREATININE 8.3* 6.0* 6.9*   CALCIUM 8.6 8.5 8.0*     Recent Labs     08/29/21  1834   AST 13* ALT 17   BILITOT <0.2   ALKPHOS 92     No results for input(s): INR in the last 72 hours. Recent Labs     08/29/21  1834 08/29/21  2353 08/30/21  0257   TROPONINI 0.13* 0.15* 0.15*     Radiology:  XR CHEST PORTABLE   Final Result   Stable perihilar edema or infiltrate. Questionable left basilar infiltrate. XR CHEST PORTABLE   Final Result   Perihilar and right basilar opacities. Correlate with presentation for   pulmonary edema. Correlate clinically to exclude underlying pneumonia. Assessment/Plan:    Active Hospital Problems    Diagnosis Date Noted    Acute on chronic combined systolic and diastolic heart failure (HCC) [I50.43]      Priority: High    Type 2 diabetes, uncontrolled, with neuropathy ([facility] [E11.40, E11.65] 03/04/2014     Priority: High    Essential hypertension [I10] 11/14/2013     Priority: Medium    Chronic obstructive pulmonary disease ([facility] [facility] [J44.9] 05/14/2013     Priority: Medium    Obesity [E66.9] 11/03/2020    [facility] [J96.00] 11/09/2018    ZAINAB on CPAP [G47.33, Z99.89] 02/11/2016    Coronary artery disease involving native coronary artery of native heart without angina pectoris [I25.10]      Sepsis in patient with acute hypoxemic respiratory failure likley 2/2 pneumonia or pulmonary edema  -Met sepsis criteria with WBC 16.4, , lactic acid 2.8 on admission. SPO2 80% on  RA, now on CPAP  -required CPAP on admission ; clinically sepsis syndrome resolved. - hypoxemic respiratory failure multifactoral with COPD, CHF, restrictive disease caused by obesity and fluid overload d/t missing dialysis  -CXR revealed:  Perihilar and right basilar opacities. Correlate with presentation for pulmonary edema. Correlate clinically to exclude underlying pneumonia  -30ml/kg IVF not given on admission d/t ESRD and respiratory status.  -vancomycin and cefepime given in ED and continued through 9/1/2021 and transition to p.o.  Augmentin to complete 5-day course.  -blood cultures ordered in ED -no growth to date ; sputum cultures NGTD-fentanyl given in ED  -continue prn inhalers; IS; encourage coughing and deep breathing; acapella     Acute on chronic combined systolic and diastolic heart failure  -CXR revealed: correlation with presentation for pulmonary edema ; improved with HD session on 8/30/2021  -ProBNP 38,040  -strict I&O -net negative balance of 8 L thus far  -daily weights  -continue demadex  -ECHO 1/5/2021: EF 55%  - F/up CXR on 9/1/2021 with persistent pulmonary edema. Continue current care     COPD w/o exacerbation  -oxygen therapy protocol; wean as able  -continue prn inhalers and nebulizers     ESRD, HD M-W-F  - Missed last HD session PTA. -Nephrology following and assisting with hemodialysis; monitor BMP  -Complaints advised     Elevated troponin, 0.13  -likely 2/2 demand ischemia r/t ESRD  -  Troponin plateaued at 6.36   -tele monitoring     Morbid obesity  With Body mass index is 75.3 kg/m². Complicating assessment and treatment. Placing patient at risk for multiple co-morbidities as well as early death and contributing to the patient's presentation. Counseled on weight loss     DM2 with neuropathy, uncontrolled  -  -hemglobin a1c 10% on 7/27/2021  -hold home metformin  -hdssi  -poct ac/hs  -hypoglycemia protocol  -carb control diet  -continue gabapentin     Essential HTN in setting of known CAD  -continue hydralazine and losartan  -continue asa  -continue imdur     ZAINAB   -CPAP at night when not on bipap     Chronic normocytic anemia, 11.0/34.3 on admission  -no s/s of bleeding at this time  -cbc in am     Anxiety depression   -mood appears stable at this time  -continue home medications     DVT Prophylaxis: Heparin subcu  Diet: ADULT DIET; Regular; 3 carb choices (45 gm/meal);  Low Sodium (2 gm)  Code Status: Full Code    PT/OT Eval Status: Ambulatory    Dispo -likely 1 to 2 days pending clinical improvement     The note was completed using Dragon -speech recognition software & EMR  . Every effort was made to ensure accuracy; however, inadvertent computerized transcription errors may be present.     Aquilino Jacobson MD

## 2021-09-01 NOTE — FLOWSHEET NOTE
Treatment time: 3.5hrs    Net UF: 4.99 liters    Pre weight: 123.1kg standing scale  Post weight: 118.3kg  EDW: 117kg    Access used: [facility] TDC  Access function: Fair w/bfr @ 400 max    Medications or blood products given: Hectorol, Retacrit and heparin dwells    Regular outpatient schedule: MWF    Summary of response to treatment:      09/01/21 1137   Vital Signs   /72   Temp 97.7 °F (36.5 °C)   Pulse 86   Resp 20   Weight 260 lb 12.9 oz (118.3 kg)   Weight Method Standing scale   Percent Weight Change -3.9   Dry Weight 257 lb 15 oz (117 kg)   Pain Assessment   Pain Assessment 0-10   Pain Level 9   Pain Type Chronic pain   Pain Location Back; Hip   Post-Hemodialysis Assessment   Post-Treatment Procedures Blood returned;Catheter capped, clamped and heparinized x 2 ports   Machine Disinfection Process Acid/Vinegar Clean;Heat Disinfect; Exterior Machine Disinfection   Rinseback Volume (ml) 400 ml   Total Liters Processed (l/min) 76.6 l/min   Dialyzer Clearance Moderately streaked   Duration of Treatment (minutes) 210 minutes   Heparin amount administered during treatment (units) 0 units   Hemodialysis Intake (ml) 400 ml   Hemodialysis Output (ml) 5392 ml   NET Removed (ml) 4992 ml   Tolerated Treatment Fair   Patient Response to Treatment Sbp 70s toward end of tx. Responded well post blood reinfusion. Pt experienced nausea and lightheadedness upon sitting upright on side of bed prior to ending of tx. Lightheadedness subside post rinseback. Residual nausea per pt's statement. Bilateral Breath Sounds Diminished   Edema Generalized Trace   Physician Notified? Yes   Copy of dialysis treatment record placed in chart, to be scanned into EMR.

## 2021-09-01 NOTE — PLAN OF CARE
Problem: Falls - Risk of:  Goal: Will remain free from falls  Description: Will remain free from falls  Outcome: Met This Shift  Goal: Absence of physical injury  Description: Absence of physical injury  Outcome: Met This Shift     Problem: OXYGENATION/RESPIRATORY FUNCTION  Goal: Patient will maintain patent airway  Outcome: Met This Shift     Problem: OXYGENATION/RESPIRATORY FUNCTION  Goal: Patient will achieve/maintain normal respiratory rate/effort  Description: Respiratory rate and effort will be within normal limits for the patient  Outcome: Ongoing     Problem: HEMODYNAMIC STATUS  Goal: Patient has stable vital signs and fluid balance  Outcome: Ongoing     Problem: FLUID AND ELECTROLYTE IMBALANCE  Goal: Fluid and electrolyte balance are achieved/maintained  Outcome: Ongoing     Problem: ACTIVITY INTOLERANCE/IMPAIRED MOBILITY  Goal: Mobility/activity is maintained at optimum level for patient  Outcome: Ongoing      Patient's EF (Ejection Fraction) is greater than 40%    Heart Failure Medications:  Diuretics[de-identified] Torsemide    (One of the following REQUIRED for EF <40%/SYSTOLIC FAILURE but MAY be used in EF% >40%/DIASTOLIC FAILURE)        ACE[de-identified] None        ARB[de-identified] Losartan         ARNI[de-identified] None    (Beta Blockers)  NON- Evidenced Based Beta Blocker (for EF% >40%/DIASTOLIC FAILURE): None    Evidenced Based Beta Blocker::(REQUIRED for EF% <40%/SYSTOLIC FAILURE) None  . .................................................................................................................................................. Patient's weights and intake/output reviewed: Yes    Patient's Last Weight: 272 lbs obtained by standing scale. Difference of 13 lbs more than last documented weight.       Intake/Output Summary (Last 24 hours) at 8/31/2021 2317  Last data filed at 8/31/2021 2110  Gross per 24 hour   Intake 360 ml   Output 500 ml   Net -140 ml       Comorbidities Reviewed Yes    Patient has a past medical history of Ambulatory dysfunction, Aortic stenosis, Arthritis, Asthma, Bilateral hilar adenopathy syndrome, CAD (coronary artery disease), Cardiomyopathy ([address] CHF (congestive heart failure) ([address] Chronic pain, COPD (chronic obstructive pulmonary disease) ([address] Depression, Diabetes mellitus ([address] Difficult intravenous access, Emphysema of lung ([address] ESRD (end stage renal disease) on dialysis ([address] Fear of needles, Gastric ulcer, GERD (gastroesophageal reflux disease), Heart valve problem, Hemodialysis patient ([address] History of spinal fracture, Hx of blood clots, Hyperlipidemia, Hypertension, MI (myocardial infarction) ([address] Neuromuscular disorder ([address] Numbness and tingling in left arm, Pneumonia, PONV (postoperative nausea and vomiting), Prolonged emergence from general anesthesia, Sleep apnea, Stroke ([address] TIA (transient ischemic attack), and Unspecified diseases of blood and blood-forming organs. >>For CHF and Comorbidity documentation on Education Time and Topics, please see Education Tab    Progressive Mobility Assessment:  What is this patient's Current Level of Mobility?: Ambulatory- with Assistance  How was this patient Mobilized today?: Edge of Bed, Up to Chair, Bedside Commode,  Up to Toilet/Shower, and Up in Room                 With Whom? Nurse, PCA, and Self                 Level of Difficulty/Assistance: Independent     Pt resting in bed at this time on  3 L O2. Pt with complaints of shortness of breath. Pt with nonpitting lower extremity edema.      Patient and/or Family's stated Goal of Care this Admission: reduce shortness of breath, increase activity tolerance, better understand heart failure and disease management, be more comfortable, and reduce lower extremity edema prior to discharge        :

## 2021-09-01 NOTE — PROGRESS NOTES
09/01/21 0332   NIV Type   $NIV $Daily Charge   NIV Started/Stopped On   Equipment Type v60   Mode CPAP   Mask Type Full face mask   Mask Size Large   Bonnet size Large   Settings/Measurements   CPAP/EPAP 14 cmH2O   Resp 17   FiO2  40 %   Vt Exhaled 546 mL   Mask Leak (lpm) 18 lpm   Comfort Level Good   Using Accessory Muscles No   Breath Sounds   Right Upper Lobe Diminished   Right Middle Lobe Diminished   Right Lower Lobe Diminished   Left Upper Lobe Diminished   Left Lower Lobe Diminished   Alarm Settings   Alarms On Y   Press Low Alarm 6 cmH2O   High Pressure Alarm 30 cmH2O   Resp Rate Low Alarm 6   High Respiratory Rate 40 br/min

## 2021-09-01 NOTE — CARE COORDINATION
CM met pt at bedside and discussed d/c plan and code status. Pt will d/c home, open to [place] [id number] if ordered. Pt currently on O2, wears 0 at home. CM will continue to follow.   Rosemarie Ferrera RN

## 2021-09-02 LAB
ANION GAP SERPL CALCULATED.3IONS-SCNC: 14 MMOL/L (ref 3–16)
ANISOCYTOSIS: ABNORMAL
ATYPICAL LYMPHOCYTE RELATIVE PERCENT: 4 % (ref 0–6)
BANDED NEUTROPHILS RELATIVE PERCENT: 8 % (ref 0–7)
BASOPHILS ABSOLUTE: 0.1 K/UL (ref 0–0.2)
BASOPHILS RELATIVE PERCENT: 1 %
BLOOD CULTURE, ROUTINE: NORMAL
BUN BLDV-MCNC: 38 MG/DL (ref 7–20)
CALCIUM SERPL-MCNC: 8.4 MG/DL (ref 8.3–10.6)
CHLORIDE BLD-SCNC: 89 MMOL/L (ref 99–110)
CO2: 21 MMOL/L (ref 21–32)
CREAT SERPL-MCNC: 4.8 MG/DL (ref 0.9–1.3)
CULTURE, BLOOD 2: NORMAL
CULTURE, RESPIRATORY: NORMAL
EOSINOPHILS ABSOLUTE: 0.4 K/UL (ref 0–0.6)
EOSINOPHILS RELATIVE PERCENT: 4 %
GFR AFRICAN AMERICAN: 15
GFR NON-AFRICAN AMERICAN: 13
GLUCOSE BLD-MCNC: 183 MG/DL (ref 70–99)
GLUCOSE BLD-MCNC: 194 MG/DL (ref 70–99)
GLUCOSE BLD-MCNC: 206 MG/DL (ref 70–99)
GLUCOSE BLD-MCNC: 221 MG/DL (ref 70–99)
GLUCOSE BLD-MCNC: 227 MG/DL (ref 70–99)
GRAM STAIN RESULT: NORMAL
HCT VFR BLD CALC: 28.4 % (ref 40.5–52.5)
HEMOGLOBIN: 9.7 G/DL (ref 13.5–17.5)
LYMPHOCYTES ABSOLUTE: 1.8 K/UL (ref 1–5.1)
LYMPHOCYTES RELATIVE PERCENT: 15 %
MACROCYTES: ABNORMAL
MAGNESIUM: 1.8 MG/DL (ref 1.8–2.4)
MCH RBC QN AUTO: 30.5 PG (ref 26–34)
MCHC RBC AUTO-ENTMCNC: 34 G/DL (ref 31–36)
MCV RBC AUTO: 89.9 FL (ref 80–100)
MICROCYTES: ABNORMAL
MONOCYTES ABSOLUTE: 0.7 K/UL (ref 0–1.3)
MONOCYTES RELATIVE PERCENT: 7 %
MYELOCYTE PERCENT: 1 %
NEUTROPHILS ABSOLUTE: 6.6 K/UL (ref 1.7–7.7)
NEUTROPHILS RELATIVE PERCENT: 60 %
PDW BLD-RTO: 15.7 % (ref 12.4–15.4)
PERFORMED ON: ABNORMAL
PLATELET # BLD: 243 K/UL (ref 135–450)
PMV BLD AUTO: 8.6 FL (ref 5–10.5)
POIKILOCYTES: ABNORMAL
POLYCHROMASIA: ABNORMAL
POTASSIUM SERPL-SCNC: 4.4 MMOL/L (ref 3.5–5.1)
RBC # BLD: 3.16 M/UL (ref 4.2–5.9)
SODIUM BLD-SCNC: 124 MMOL/L (ref 136–145)
WBC # BLD: 9.5 K/UL (ref 4–11)

## 2021-09-02 PROCEDURE — 80048 BASIC METABOLIC PNL TOTAL CA: CPT

## 2021-09-02 PROCEDURE — 6360000002 HC RX W HCPCS: Performed by: NURSE PRACTITIONER

## 2021-09-02 PROCEDURE — 36415 COLL VENOUS BLD VENIPUNCTURE: CPT

## 2021-09-02 PROCEDURE — 2580000003 HC RX 258: Performed by: EMERGENCY MEDICINE

## 2021-09-02 PROCEDURE — 6370000000 HC RX 637 (ALT 250 FOR IP): Performed by: NURSE PRACTITIONER

## 2021-09-02 PROCEDURE — 2500000003 HC RX 250 WO HCPCS: Performed by: INTERNAL MEDICINE

## 2021-09-02 PROCEDURE — 6370000000 HC RX 637 (ALT 250 FOR IP): Performed by: INTERNAL MEDICINE

## 2021-09-02 PROCEDURE — 85025 COMPLETE CBC W/AUTO DIFF WBC: CPT

## 2021-09-02 PROCEDURE — 83735 ASSAY OF MAGNESIUM: CPT

## 2021-09-02 PROCEDURE — 2060000000 HC ICU INTERMEDIATE R&B

## 2021-09-02 RX ADMIN — INSULIN LISPRO 3 UNITS: 100 INJECTION, SOLUTION INTRAVENOUS; SUBCUTANEOUS at 21:19

## 2021-09-02 RX ADMIN — ISOSORBIDE MONONITRATE 30 MG: 30 TABLET, EXTENDED RELEASE ORAL at 09:00

## 2021-09-02 RX ADMIN — NEPHROCAP 1 MG: 1 CAP ORAL at 09:00

## 2021-09-02 RX ADMIN — HEPARIN SODIUM 5000 UNITS: 5000 INJECTION INTRAVENOUS; SUBCUTANEOUS at 16:02

## 2021-09-02 RX ADMIN — INSULIN LISPRO 3 UNITS: 100 INJECTION, SOLUTION INTRAVENOUS; SUBCUTANEOUS at 09:05

## 2021-09-02 RX ADMIN — GABAPENTIN 100 MG: 100 CAPSULE ORAL at 09:00

## 2021-09-02 RX ADMIN — OXYCODONE AND ACETAMINOPHEN 1 TABLET: 7.5; 325 TABLET ORAL at 05:47

## 2021-09-02 RX ADMIN — LOSARTAN POTASSIUM 50 MG: 25 TABLET, FILM COATED ORAL at 08:59

## 2021-09-02 RX ADMIN — INSULIN LISPRO 6 UNITS: 100 INJECTION, SOLUTION INTRAVENOUS; SUBCUTANEOUS at 16:04

## 2021-09-02 RX ADMIN — GABAPENTIN 100 MG: 100 CAPSULE ORAL at 21:19

## 2021-09-02 RX ADMIN — HYDRALAZINE HYDROCHLORIDE 50 MG: 50 TABLET, FILM COATED ORAL at 16:01

## 2021-09-02 RX ADMIN — AMOXICILLIN AND CLAVULANATE POTASSIUM 1 TABLET: 875; 125 TABLET, FILM COATED ORAL at 21:19

## 2021-09-02 RX ADMIN — ASPIRIN 81 MG: 81 TABLET, CHEWABLE ORAL at 08:59

## 2021-09-02 RX ADMIN — HYDRALAZINE HYDROCHLORIDE 50 MG: 50 TABLET, FILM COATED ORAL at 05:47

## 2021-09-02 RX ADMIN — TRAZODONE HYDROCHLORIDE 100 MG: 50 TABLET ORAL at 21:19

## 2021-09-02 RX ADMIN — CITALOPRAM HYDROBROMIDE 40 MG: 20 TABLET ORAL at 09:00

## 2021-09-02 RX ADMIN — AMOXICILLIN AND CLAVULANATE POTASSIUM 1 TABLET: 875; 125 TABLET, FILM COATED ORAL at 09:00

## 2021-09-02 RX ADMIN — QUETIAPINE FUMARATE 50 MG: 25 TABLET ORAL at 21:19

## 2021-09-02 RX ADMIN — SODIUM CHLORIDE, PRESERVATIVE FREE 10 ML: 5 INJECTION INTRAVENOUS at 21:20

## 2021-09-02 RX ADMIN — GABAPENTIN 100 MG: 100 CAPSULE ORAL at 16:01

## 2021-09-02 RX ADMIN — HYDROXYZINE PAMOATE 50 MG: 25 CAPSULE ORAL at 21:19

## 2021-09-02 RX ADMIN — PRAVASTATIN SODIUM 40 MG: 40 TABLET ORAL at 21:19

## 2021-09-02 RX ADMIN — HEPARIN SODIUM 5000 UNITS: 5000 INJECTION INTRAVENOUS; SUBCUTANEOUS at 05:47

## 2021-09-02 RX ADMIN — CALCIUM ACETATE 667 MG: 667 CAPSULE ORAL at 09:00

## 2021-09-02 RX ADMIN — TORSEMIDE 100 MG: 100 TABLET ORAL at 09:00

## 2021-09-02 RX ADMIN — HYDRALAZINE HYDROCHLORIDE 50 MG: 50 TABLET, FILM COATED ORAL at 21:19

## 2021-09-02 RX ADMIN — INSULIN LISPRO 6 UNITS: 100 INJECTION, SOLUTION INTRAVENOUS; SUBCUTANEOUS at 13:42

## 2021-09-02 RX ADMIN — DILTIAZEM HYDROCHLORIDE 180 MG: 180 CAPSULE, COATED, EXTENDED RELEASE ORAL at 09:00

## 2021-09-02 RX ADMIN — CALCIUM ACETATE 667 MG: 667 CAPSULE ORAL at 16:02

## 2021-09-02 RX ADMIN — HEPARIN SODIUM 5000 UNITS: 5000 INJECTION INTRAVENOUS; SUBCUTANEOUS at 21:19

## 2021-09-02 ASSESSMENT — PAIN DESCRIPTION - PROGRESSION: CLINICAL_PROGRESSION: NOT CHANGED

## 2021-09-02 ASSESSMENT — PAIN SCALES - GENERAL
PAINLEVEL_OUTOF10: 0
PAINLEVEL_OUTOF10: 0
PAINLEVEL_OUTOF10: 9
PAINLEVEL_OUTOF10: 9

## 2021-09-02 ASSESSMENT — PAIN SCALES - WONG BAKER: WONGBAKER_NUMERICALRESPONSE: 4

## 2021-09-02 NOTE — PROGRESS NOTES
Pt high fall risk. Instructed to use call light before getting out of bed. Pt refusing at this time. Call light within reach. Bed in low position. Bed alarm refused. Will continue to monitor.

## 2021-09-02 NOTE — PROGRESS NOTES
Nephrology Progress Note   [facility]      This patient is a 46year old male whom we are following for ESKD. Subjective: The patient was seen and examined; he feels well today with no CP, SOB, nausea or vomiting. ROS: No fever or chills. Social: No family at bedside. Vitals:  BP (!) 114/58   Pulse 91   Temp 98.1 °F (36.7 °C) (Oral)   Resp 18   Ht 5' 7\" (1.702 m)   Wt 260 lb 12.9 oz (118.3 kg)   SpO2 94%   BMI 40.85 kg/m²   I/O last 3 completed shifts: In: 400   Out: 7058   I/O this shift: In: 400 [P.O.:400]  Out: -     Physical Exam:  Physical Exam  Vitals reviewed. Constitutional:       General: He is not in acute distress. Appearance: Normal appearance. HENT:      Head: Normocephalic and atraumatic. Eyes:      General: No scleral icterus. Conjunctiva/sclera: Conjunctivae normal.   Cardiovascular:      Rate and Rhythm: Normal rate. Heart sounds: No friction rub. Pulmonary:      Effort: Pulmonary effort is normal. No respiratory distress. Breath sounds: Wheezing present. Abdominal:      General: Bowel sounds are normal. There is no distension. Tenderness: There is no abdominal tenderness. Musculoskeletal:      Right lower leg: Edema present. Left lower leg: Edema present. Neurological:      Mental Status: He is alert.        Access: J TDC    Medications:   b complex-C-folic acid  1 capsule Oral Daily    amoxicillin-clavulanate  1 tablet Oral 2 times per day    QUEtiapine  50 mg Oral Nightly    epoetin siddharth-epbx  1,600 Units IntraVENous Q MWF    Calcium Acetate (Phos Binder)  667 mg Oral TID     doxercalciferol  3 mcg IntraVENous Q MWF    sodium chloride flush  5-40 mL IntraVENous 2 times per day    aspirin  81 mg Oral Daily    citalopram  40 mg Oral Daily    dilTIAZem  180 mg Oral Daily    gabapentin  100 mg Oral TID    hydrALAZINE  50 mg Oral 3 times per day    isosorbide mononitrate  30 mg Oral Daily    losartan  50 mg Oral Daily    pravastatin  40 mg Oral Nightly    torsemide  100 mg Oral Daily    traZODone  100 mg Oral Nightly    hydrOXYzine  50 mg Oral Nightly    insulin lispro  0-18 Units SubCUTAneous TID WC    insulin lispro  0-9 Units SubCUTAneous Nightly    heparin (porcine)  5,000 Units SubCUTAneous 3 times per day         Labs:  Recent Labs     08/31/21  0704 09/01/21  0800 09/02/21  0745   WBC 12.5* 10.1 9.5   HGB 8.5* 9.4* 9.7*   HCT 26.4* 27.8* 28.4*   MCV 91.8 91.5 89.9    243 243     Recent Labs     08/31/21  0704 09/01/21  0800 09/02/21  0745   * 128* 124*   K 4.6 4.7 4.4   CL 91* 91* 89*   CO2 23 20* 21   GLUCOSE 267* 215* 183*   MG 1.90 1.90 1.80   BUN 51* 61* 38*   CREATININE 6.0* 6.9* 4.8*   LABGLOM 10* 8* 13*   GFRAA 12* 10* 15*         Assessment/Plan:      ESKD. - On HD MWF at [facility]. - Access: [facility] TDC  - Change TW to 118 kgs.     Hyponatremia.  - From fluid overload and hyperglycemia. - UF with HD.  - Sodium and fluid restriction.     Anemia.  - Continue EPO with HD (1600 units).     Metabolic Acidosis. - From missed HD.   - Improved.     CKD-MBD.  - Continue Hectorol and Calcium acetate.     SOB. - From fluid overload and possible pneumonia. - Fluid removal with HD.  - Antibiotics per primary service. Leukocytosis improving.

## 2021-09-02 NOTE — PROGRESS NOTES
/83   Pulse 94   Temp 98 °F (36.7 °C) (Axillary)   Resp 16   Ht 5' 7\" (1.702 m)   Wt 260 lb 12.9 oz (118.3 kg)   SpO2 98%   BMI 40.85 kg/m²     Alert and oriented  Removed tele -  no orders  Wants shower   Wants shower chair not commode  Left vasc cath, CDI  r arm IV  Usually uses rollator at home  Up independently to BR  On oxygen  Needs another dose of diaylsis tomorrow then dc  Chronic low back pain 9/10 gave percocet    2250 rounded. No longer wants a shower. Lying in bed. Alert. Denies needs. 2343 /76   Pulse 86   Temp 98.4 °F (36.9 °C) (Oral)   Resp 16   Ht 5' 7\" (1.702 m)   Wt 260 lb 12.9 oz (118.3 kg)   SpO2 98%   BMI 40.85 kg/m²     Continues to have unrelieved chronic pain in hips. Offered & declined ice/heat. RT in room hooked up to Bipap/Cpap.    0545 /79   Pulse 92   Temp 98 °F (36.7 °C) (Axillary)   Resp 16   Ht 5' 7\" (1.702 m)   Wt 260 lb 12.9 oz (118.3 kg)   SpO2 95%   BMI 40.85 kg/m²     Alert. Respirations easy unlabored. Oxygen sat 95%RA. C/O hip/leg pain 9/10. Pain/Discomfort is being managed with PRN analgesics per MD orders (See MAR). Patient is able to express and rate pain using numerical scale. 1450 Refused daily weight. Stated he would later.

## 2021-09-02 NOTE — PROGRESS NOTES
Hospitalist Progress Note      PCP: Ale Cummins MD    Date of Admission: 8/29/2021    Chief Complaint: Shortness of breath secondary to missing his hemodialysis    Hospital Course: Reviewed H&P    Subjective: Patient seen and examined this morning. Reviewed chest x-ray results from yesterday - consistent with ongoing pulmonary edema and elevated proBNP. Continue current care. Leukocytosis resolved. Continue p.o. Augmentin. Cultures thus far negative. Continue Demadex. Net negative balance of 8.9 L thus far. Has no new complaints. RN to wean oxygen as able. Encourage patient to ambulate in escobar. Await nephrology rounds today.     Medications:  Reviewed    Infusion Medications    sodium chloride      dextrose       Scheduled Medications    b complex-C-folic acid  1 capsule Oral Daily    amoxicillin-clavulanate  1 tablet Oral 2 times per day    QUEtiapine  50 mg Oral Nightly    epoetin siddharth-epbx  1,600 Units IntraVENous Q MWF    Calcium Acetate (Phos Binder)  667 mg Oral TID WC    doxercalciferol  3 mcg IntraVENous Q MWF    sodium chloride flush  5-40 mL IntraVENous 2 times per day    aspirin  81 mg Oral Daily    citalopram  40 mg Oral Daily    dilTIAZem  180 mg Oral Daily    gabapentin  100 mg Oral TID    hydrALAZINE  50 mg Oral 3 times per day    isosorbide mononitrate  30 mg Oral Daily    losartan  50 mg Oral Daily    pravastatin  40 mg Oral Nightly    torsemide  100 mg Oral Daily    traZODone  100 mg Oral Nightly    hydrOXYzine  50 mg Oral Nightly    insulin lispro  0-18 Units SubCUTAneous TID WC    insulin lispro  0-9 Units SubCUTAneous Nightly    heparin (porcine)  5,000 Units SubCUTAneous 3 times per day     PRN Meds: albumin human, oxyCODONE-acetaminophen, heparin (porcine), sodium chloride, nitroGLYCERIN, sodium chloride flush, albuterol, albuterol sulfate HFA, ipratropium-albuterol, glucose, dextrose, glucagon (rDNA), dextrose, ondansetron **OR** ondansetron, polyethylene glycol, acetaminophen **OR** acetaminophen      Intake/Output Summary (Last 24 hours) at 9/2/2021 1603  Last data filed at 9/2/2021 0901  Gross per 24 hour   Intake 400 ml   Output --   Net 400 ml       Physical Exam Performed:  /62   Pulse 91   Temp 98.1 °F (36.7 °C) (Oral)   Resp 18   Ht 5' 7\" (1.702 m)   Wt 260 lb 12.9 oz (118.3 kg)   SpO2 94%   BMI 40.85 kg/m²     General appearance:  Pleasant, obese male in no apparent distress, appears stated age and cooperative. HEENT: Pupils equal, round, and reactive to light. Extra ocular muscles intact. Conjunctivae/corneas clear. Neck: Supple, with full range of motion. No jugular venous distention. Trachea midline. Respiratory:  Normal respiratory effort. Diminished, Clear to auscultation, bilaterally without Rales/Wheezes/Rhonchi. Cardiovascular:  Regular rate and rhythm with normal S1/S2 without murmurs, rubs or gallops. Abdomen: Soft, obese, round non-tender, non-distended with normal bowel sounds. Musculoskeletal:  No clubbing, cyanosis or edema bilaterally. Full range of motion without deformity. Skin: Skin color, texture, turgor normal.  No rashes or lesions. Neurologic:  Neurovascularly intact without any focal sensory/motor deficits. Cranial nerves: II-XII intact, grossly non-focal.  Psychiatric:  Alert and oriented, thought content appropriate, normal insight  Capillary Refill: Brisk,3 seconds, normal  Peripheral Pulses: +2 palpable, equal bilaterally       Labs:   Recent Labs     08/31/21  0704 09/01/21  0800 09/02/21  0745   WBC 12.5* 10.1 9.5   HGB 8.5* 9.4* 9.7*   HCT 26.4* 27.8* 28.4*    243 243     Recent Labs     08/31/21  0704 09/01/21  0800 09/02/21  0745   * 128* 124*   K 4.6 4.7 4.4   CL 91* 91* 89*   CO2 23 20* 21   BUN 51* 61* 38*   CREATININE 6.0* 6.9* 4.8*   CALCIUM 8.5 8.0* 8.4     No results for input(s): AST, ALT, BILIDIR, BILITOT, ALKPHOS in the last 72 hours.   No results for input(s): INR in the last 72 hours. No results for input(s): Jose Oiler in the last 72 hours. Radiology:  XR CHEST PORTABLE   Final Result   Stable perihilar edema or infiltrate. Questionable left basilar infiltrate. XR CHEST PORTABLE   Final Result   Perihilar and right basilar opacities. Correlate with presentation for   pulmonary edema. Correlate clinically to exclude underlying pneumonia. Assessment/Plan:    Active Hospital Problems    Diagnosis Date Noted    Acute on chronic combined systolic and diastolic heart failure (HCC) [I50.43]      Priority: High    Type 2 diabetes, uncontrolled, with neuropathy ([facility] [address] [E11.40, E11.65] 03/04/2014     Priority: High    Essential hypertension [I10] 11/14/2013     Priority: Medium    Chronic obstructive pulmonary disease ([facility] [address] [J44.9] 05/14/2013     Priority: Medium    Obesity [E66.9] 11/03/2020    [facility] [J96.00] 11/09/2018    ZAINAB on CPAP [G47.33, Z99.89] 02/11/2016    Coronary artery disease involving native coronary artery of native heart without angina pectoris [I25.10]      Sepsis in patient with acute hypoxemic respiratory failure likley 2/2 pneumonia or pulmonary edema  -Met sepsis criteria with WBC 16.4, , lactic acid 2.8 on admission. SPO2 80% on  RA, now on CPAP  -required CPAP on admission ; clinically sepsis syndrome resolved. - hypoxemic respiratory failure multifactoral with COPD, CHF, restrictive disease caused by obesity and fluid overload d/t missing dialysis  -CXR revealed:  Perihilar and right basilar opacities. Correlate with presentation for pulmonary edema. Correlate clinically to exclude underlying pneumonia  -30ml/kg IVF not given on admission d/t ESRD and respiratory status.  -vancomycin and cefepime given in ED and continued through 9/1/2021 and transition to p.o.  Augmentin to complete 5-day course.  -blood cultures ordered in ED -no growth to date ; sputum cultures NGTD-fentanyl given in was made to ensure accuracy; however, inadvertent computerized transcription errors may be present.     Luz Thorne MD

## 2021-09-02 NOTE — PROGRESS NOTES
09/01/21 2334   NIV Type   Skin Assessment Clean, dry, & intact   Skin Protection for O2 Device Yes   Location Nose   NIV Started/Stopped On   Equipment Type V60   Mode CPAP   Mask Type Full face mask   Mask Size Large   Settings/Measurements   CPAP/EPAP 14 cmH2O   Resp 12   FiO2  40 %   Vt Exhaled 585 mL   Minute Volume 8.3 Liters   Mask Leak (lpm) 10 lpm   Comfort Level Good   Using Accessory Muscles No   SpO2 99   Breath Sounds   Right Upper Lobe Diminished   Right Middle Lobe Diminished   Right Lower Lobe Diminished   Left Upper Lobe Diminished   Left Lower Lobe Diminished   Alarm Settings   Alarms On Y

## 2021-09-02 NOTE — PLAN OF CARE
Problem: Nutrition  Goal: Optimal nutrition therapy  Outcome: Ongoing  Note: Nutrition Problem #1: Increased nutrient needs  Intervention: Food and/or Nutrient Delivery: Continue Current Diet, Start Oral Nutrition Supplement  Nutritional Goals: Pt will consume 50% or greater of meals and ONS this admission

## 2021-09-02 NOTE — PROGRESS NOTES
Comprehensive Nutrition Assessment    Type and Reason for Visit:  Initial, RD Nutrition Re-Screen/LOS    Nutrition Recommendations/Plan:   1. Continue carb control, 2 gm sodium diet   2. Add Nepro BID - flavor preferences added   3. Encourage diet compliance   4. Consider addition of bowel regimen - MD to advise   5. Monitor nutrition adequacy, pertinent labs, bowel habits, wt changes, and clinical progress     Nutrition Assessment:  47 yo male admitted with CHF, hx of non-compliance. Pt nutritionally at risk AEB decreased appetite. C/o constipation, encouraged high fiber. Pt favorable to adding ONS to promote nutrition. Further discussed diet education. Pt asking for reyna with lunch, unlikely to make necessary diet changes at home. Will continue to monitor. Malnutrition Assessment:  Malnutrition Status: At risk for malnutrition (Comment)      Estimated Daily Nutrient Needs:  Energy (kcal):  4550-5579 kcal; Weight Used for Energy Requirements:  Ideal (67 kg)     Protein (g):  80-94 g; Weight Used for Protein Requirements:  Ideal (1.2-1.4 g/kg)        Fluid (ml/day):  2000 mL per CHF recommendations;     Nutrition Related Findings:  Blood sugars improving. Na 124 mmol/L. -8.5 L per I&Os. Wounds:  Diabetic Ulcer       Current Nutrition Therapies:    ADULT DIET; Regular; 3 carb choices (45 gm/meal);  Low Sodium (2 gm)    Anthropometric Measures:  · Height: 5' 7\" (170.2 cm)  · Current Body Weight: 260 lb (117.9 kg)   · Admission Body Weight: 270 lb (122.5 kg)    · Ideal Body Weight: 148 lbs; % Ideal Body Weight 175.7 %   · BMI: 40.7  · BMI Categories: Obese Class 3 (BMI 40.0 or greater)       Nutrition Diagnosis:   · Increased nutrient needs related to increase demand for energy/nutrients as evidenced by dialysis, wounds      Nutrition Interventions:   Food and/or Nutrient Delivery:  Continue Current Diet, Start Oral Nutrition Supplement  Nutrition Education/Counseling:  Education initiated   Coordination of Nutrition Care:  Continue to monitor while inpatient    Goals:  Pt will consume 50% or greater of meals and ONS this admission       Nutrition Monitoring and Evaluation:   Behavioral-Environmental Outcomes:  Readiness for Change, Beliefs and Attitutes   Food/Nutrient Intake Outcomes:  Food and Nutrient Intake, Supplement Intake  Physical Signs/Symptoms Outcomes:  Biochemical Data, Weight, Fluid Status or Edema     Discharge Planning:    Continue current diet, Continue Oral Nutrition Supplement     Electronically signed by Bety Barrientos MS, RD, LD on 9/2/21 at 11:05 AM EDT    Contact: [phone]

## 2021-09-03 ENCOUNTER — TELEPHONE (OUTPATIENT)
Dept: FAMILY MEDICINE CLINIC | Age: 51
End: 2021-09-03

## 2021-09-03 VITALS
OXYGEN SATURATION: 93 % | HEIGHT: 67 IN | WEIGHT: 260.8 LBS | HEART RATE: 100 BPM | RESPIRATION RATE: 18 BRPM | DIASTOLIC BLOOD PRESSURE: 76 MMHG | BODY MASS INDEX: 40.93 KG/M2 | TEMPERATURE: 98.3 F | SYSTOLIC BLOOD PRESSURE: 145 MMHG

## 2021-09-03 DIAGNOSIS — I25.10 CORONARY ARTERY DISEASE INVOLVING NATIVE CORONARY ARTERY OF NATIVE HEART WITHOUT ANGINA PECTORIS: ICD-10-CM

## 2021-09-03 DIAGNOSIS — K21.9 GASTROESOPHAGEAL REFLUX DISEASE WITHOUT ESOPHAGITIS: Primary | ICD-10-CM

## 2021-09-03 LAB
ANION GAP SERPL CALCULATED.3IONS-SCNC: 15 MMOL/L (ref 3–16)
ANISOCYTOSIS: ABNORMAL
BASOPHILS ABSOLUTE: 0 K/UL (ref 0–0.2)
BASOPHILS RELATIVE PERCENT: 0 %
BUN BLDV-MCNC: 54 MG/DL (ref 7–20)
CALCIUM SERPL-MCNC: 9.1 MG/DL (ref 8.3–10.6)
CHLORIDE BLD-SCNC: 90 MMOL/L (ref 99–110)
CO2: 21 MMOL/L (ref 21–32)
CREAT SERPL-MCNC: 6.1 MG/DL (ref 0.9–1.3)
EOSINOPHILS ABSOLUTE: 0.6 K/UL (ref 0–0.6)
EOSINOPHILS RELATIVE PERCENT: 6 %
GFR AFRICAN AMERICAN: 12
GFR NON-AFRICAN AMERICAN: 10
GLUCOSE BLD-MCNC: 205 MG/DL (ref 70–99)
GLUCOSE BLD-MCNC: 223 MG/DL (ref 70–99)
GLUCOSE BLD-MCNC: 234 MG/DL (ref 70–99)
HCT VFR BLD CALC: 31.4 % (ref 40.5–52.5)
HEMOGLOBIN: 10.7 G/DL (ref 13.5–17.5)
LYMPHOCYTES ABSOLUTE: 0.8 K/UL (ref 1–5.1)
LYMPHOCYTES RELATIVE PERCENT: 8 %
MACROCYTES: ABNORMAL
MAGNESIUM: 2 MG/DL (ref 1.8–2.4)
MCH RBC QN AUTO: 30.4 PG (ref 26–34)
MCHC RBC AUTO-ENTMCNC: 34.2 G/DL (ref 31–36)
MCV RBC AUTO: 89 FL (ref 80–100)
MONOCYTES ABSOLUTE: 0.6 K/UL (ref 0–1.3)
MONOCYTES RELATIVE PERCENT: 6 %
NEUTROPHILS ABSOLUTE: 8.3 K/UL (ref 1.7–7.7)
NEUTROPHILS RELATIVE PERCENT: 80 %
PDW BLD-RTO: 15.8 % (ref 12.4–15.4)
PERFORMED ON: ABNORMAL
PERFORMED ON: ABNORMAL
PLATELET # BLD: 280 K/UL (ref 135–450)
PLATELET SLIDE REVIEW: ADEQUATE
PMV BLD AUTO: 8.5 FL (ref 5–10.5)
POLYCHROMASIA: ABNORMAL
POTASSIUM SERPL-SCNC: 4.2 MMOL/L (ref 3.5–5.1)
RBC # BLD: 3.53 M/UL (ref 4.2–5.9)
SLIDE REVIEW: ABNORMAL
SODIUM BLD-SCNC: 126 MMOL/L (ref 136–145)
WBC # BLD: 10.4 K/UL (ref 4–11)

## 2021-09-03 PROCEDURE — 6370000000 HC RX 637 (ALT 250 FOR IP): Performed by: INTERNAL MEDICINE

## 2021-09-03 PROCEDURE — 90935 HEMODIALYSIS ONE EVALUATION: CPT

## 2021-09-03 PROCEDURE — 6370000000 HC RX 637 (ALT 250 FOR IP): Performed by: NURSE PRACTITIONER

## 2021-09-03 PROCEDURE — 83735 ASSAY OF MAGNESIUM: CPT

## 2021-09-03 PROCEDURE — 6360000002 HC RX W HCPCS: Performed by: INTERNAL MEDICINE

## 2021-09-03 PROCEDURE — 80048 BASIC METABOLIC PNL TOTAL CA: CPT

## 2021-09-03 PROCEDURE — 6360000002 HC RX W HCPCS: Performed by: NURSE PRACTITIONER

## 2021-09-03 PROCEDURE — 36415 COLL VENOUS BLD VENIPUNCTURE: CPT

## 2021-09-03 PROCEDURE — P9047 ALBUMIN (HUMAN), 25%, 50ML: HCPCS | Performed by: INTERNAL MEDICINE

## 2021-09-03 PROCEDURE — 85025 COMPLETE CBC W/AUTO DIFF WBC: CPT

## 2021-09-03 RX ORDER — PANTOPRAZOLE SODIUM 40 MG/1
TABLET, DELAYED RELEASE ORAL
Qty: 30 TABLET | Refills: 1 | Status: SHIPPED | OUTPATIENT
Start: 2021-09-03 | End: 2021-10-18

## 2021-09-03 RX ORDER — TORSEMIDE 100 MG/1
100 TABLET ORAL DAILY
Qty: 30 TABLET | Refills: 3 | Status: ON HOLD | OUTPATIENT
Start: 2021-09-04 | End: 2022-01-17 | Stop reason: HOSPADM

## 2021-09-03 RX ORDER — ISOSORBIDE MONONITRATE 30 MG/1
TABLET, EXTENDED RELEASE ORAL
Qty: 30 TABLET | Refills: 10 | Status: ON HOLD | OUTPATIENT
Start: 2021-09-03 | End: 2021-10-15 | Stop reason: HOSPADM

## 2021-09-03 RX ORDER — AMOXICILLIN AND CLAVULANATE POTASSIUM 875; 125 MG/1; MG/1
1 TABLET, FILM COATED ORAL EVERY 12 HOURS SCHEDULED
Qty: 4 TABLET | Refills: 0 | Status: SHIPPED | OUTPATIENT
Start: 2021-09-03 | End: 2021-09-05

## 2021-09-03 RX ADMIN — INSULIN LISPRO 6 UNITS: 100 INJECTION, SOLUTION INTRAVENOUS; SUBCUTANEOUS at 08:37

## 2021-09-03 RX ADMIN — EPOETIN ALFA-EPBX 1600 UNITS: 2000 INJECTION, SOLUTION INTRAVENOUS; SUBCUTANEOUS at 12:40

## 2021-09-03 RX ADMIN — HEPARIN SODIUM 5000 UNITS: 5000 INJECTION INTRAVENOUS; SUBCUTANEOUS at 06:14

## 2021-09-03 RX ADMIN — OXYCODONE AND ACETAMINOPHEN 1 TABLET: 7.5; 325 TABLET ORAL at 06:58

## 2021-09-03 RX ADMIN — HYDRALAZINE HYDROCHLORIDE 50 MG: 50 TABLET, FILM COATED ORAL at 06:15

## 2021-09-03 RX ADMIN — ALBUMIN (HUMAN) 12.5 G: 0.25 INJECTION, SOLUTION INTRAVENOUS at 12:51

## 2021-09-03 RX ADMIN — INSULIN LISPRO 6 UNITS: 100 INJECTION, SOLUTION INTRAVENOUS; SUBCUTANEOUS at 14:13

## 2021-09-03 RX ADMIN — DOXERCALCIFEROL 3 MCG: 4 INJECTION, SOLUTION INTRAVENOUS at 12:39

## 2021-09-03 ASSESSMENT — PAIN SCALES - GENERAL
PAINLEVEL_OUTOF10: 0
PAINLEVEL_OUTOF10: 0
PAINLEVEL_OUTOF10: 7

## 2021-09-03 NOTE — PLAN OF CARE
Problem: Falls - Risk of:  Goal: Will remain free from falls  Description: Will remain free from falls  Outcome: Met This Shift  Goal: Absence of physical injury  Description: Absence of physical injury  Outcome: Met This Shift     Problem: Pain:  Goal: Pain level will decrease  Description: Pain level will decrease  Outcome: Met This Shift  Goal: Control of acute pain  Description: Control of acute pain  Outcome: Met This Shift  Goal: Control of chronic pain  Description: Control of chronic pain  Outcome: Met This Shift     Problem: Skin Integrity:  Goal: Will show no infection signs and symptoms  Description: Will show no infection signs and symptoms  Outcome: Ongoing  Goal: Absence of new skin breakdown  Description: Absence of new skin breakdown  Outcome: Ongoing     Problem: OXYGENATION/RESPIRATORY FUNCTION  Goal: Patient will maintain patent airway  Outcome: Ongoing  Goal: Patient will achieve/maintain normal respiratory rate/effort  Description: Respiratory rate and effort will be within normal limits for the patient  Outcome: Ongoing     Problem: HEMODYNAMIC STATUS  Goal: Patient has stable vital signs and fluid balance  Outcome: Ongoing     Problem: ACTIVITY INTOLERANCE/IMPAIRED MOBILITY  Goal: Mobility/activity is maintained at optimum level for patient  Outcome: Ongoing     Problem: Nutrition  Goal: Optimal nutrition therapy  9/2/2021 1108 by Charmayne Balint, MS, RD, LD  Outcome: Ongoing  Note: Nutrition Problem #1: Increased nutrient needs  Intervention: Food and/or Nutrient Delivery: Continue Current Diet, Start Oral Nutrition Supplement  Nutritional Goals: Pt will consume 50% or greater of meals and ONS this admission

## 2021-09-03 NOTE — PROGRESS NOTES
Treatment time: 3.5 hrs    Net UF: 5000     Pre weight: 122.2 kg  Post weight: 116.4  EDW: 117 kg    Access used: L IJ TDC  Access function: Good    Medications or blood products given: Hectorol, Retacrit, Albumin    Regular outpatient schedule: [facility]    Summary of response to treatment: Patient tolerated treatment well. Access ran well at . Report given to primary RN. Patient returned to room. VSS    Copy of dialysis treatment record placed in chart, to be scanned into EMR.

## 2021-09-03 NOTE — PROGRESS NOTES
Nephrology Progress Note   [facility]      This patient is a 46year old male whom we are following for ESKD. Subjective: The patient was seen and examined during dialysis; he feels well today with no CP, SOB, nausea or vomiting. ROS: No fever or chills. Social: No family at bedside. Vitals:  /81   Pulse 92   Temp 98.1 °F (36.7 °C)   Resp 18   Ht 5' 7\" (1.702 m)   Wt 260 lb 12.9 oz (118.3 kg)   SpO2 93%   BMI 40.85 kg/m²   I/O last 3 completed shifts: In: 1655 [P.O.:1420]  Out: 400 [Urine:400]  I/O this shift:  In: 230 [P.O.:220; I.V.:10]  Out: 0     Physical Exam:  Physical Exam  Vitals reviewed. Constitutional:       General: He is not in acute distress. Appearance: Normal appearance. HENT:      Head: Normocephalic and atraumatic. Eyes:      General: No scleral icterus. Conjunctiva/sclera: Conjunctivae normal.   Cardiovascular:      Rate and Rhythm: Normal rate. Heart sounds: No friction rub. Pulmonary:      Effort: Pulmonary effort is normal. No respiratory distress. Breath sounds: Wheezing present. Abdominal:      General: Bowel sounds are normal. There is no distension. Tenderness: There is no abdominal tenderness. Musculoskeletal:      Right lower leg: Edema present. Left lower leg: Edema present. Neurological:      Mental Status: He is alert.        Access: LIJ TDC    Medications:   b complex-C-folic acid  1 capsule Oral Daily    amoxicillin-clavulanate  1 tablet Oral 2 times per day    QUEtiapine  50 mg Oral Nightly    epoetin siddharth-epbx  1,600 Units IntraVENous Q MWF    Calcium Acetate (Phos Binder)  667 mg Oral TID WC    doxercalciferol  3 mcg IntraVENous Q MWF    sodium chloride flush  5-40 mL IntraVENous 2 times per day    aspirin  81 mg Oral Daily    citalopram  40 mg Oral Daily    dilTIAZem  180 mg Oral Daily    gabapentin  100 mg Oral TID    hydrALAZINE  50 mg Oral 3 times per day    isosorbide mononitrate  30 mg Oral Daily    losartan  50 mg Oral Daily    pravastatin  40 mg Oral Nightly    torsemide  100 mg Oral Daily    traZODone  100 mg Oral Nightly    hydrOXYzine  50 mg Oral Nightly    insulin lispro  0-18 Units SubCUTAneous TID WC    insulin lispro  0-9 Units SubCUTAneous Nightly    heparin (porcine)  5,000 Units SubCUTAneous 3 times per day         Labs:  Recent Labs     09/01/21  0800 09/02/21  0745 09/03/21  0622   WBC 10.1 9.5 10.4   HGB 9.4* 9.7* 10.7*   HCT 27.8* 28.4* 31.4*   MCV 91.5 89.9 89.0    243 280     Recent Labs     09/01/21  0800 09/02/21  0745 09/03/21  0622   * 124* 126*   K 4.7 4.4 4.2   CL 91* 89* 90*   CO2 20* 21 21   GLUCOSE 215* 183* 223*   MG 1.90 1.80 2.00   BUN 61* 38* 54*   CREATININE 6.9* 4.8* 6.1*   LABGLOM 8* 13* 10*   GFRAA 10* 15* 12*         Assessment/Plan:      ESKD. - HD in progress per [facility]). - Access: BRANDON TDC  - Change TW to 118 kgs.     Hyponatremia.  - From fluid overload and hyperglycemia. - UF with HD.  - Sodium and fluid restriction.     Anemia.  - Continue EPO with HD (1600 units).     Metabolic Acidosis. - From missed HD.   - Improved.     CKD-MBD.  - Continue Hectorol and Calcium acetate.     SOB. - From fluid overload and possible pneumonia. - Fluid removal with HD.  - Antibiotics per primary service. Leukocytosis improving.

## 2021-09-03 NOTE — PLAN OF CARE
Problem: Falls - Risk of:  Goal: Will remain free from falls  Description: Will remain free from falls  9/3/2021 1032 by Prem Schmidt RN  Outcome: Ongoing  Note: Patient will remain free of falls. Patient calls out appropriately. Patient utilizes call light, bed is in lowest locked position, nonskid footwear in place. Patient's room is kept free of clutter and patient is assisted with ambulation as needed. Will continue to monitor and educate patient on safety precautions.      Problem: Skin Integrity:  Goal: Will show no infection signs and symptoms  Description: Will show no infection signs and symptoms  9/3/2021 1032 by Prem Schmidt RN  Outcome: Ongoing

## 2021-09-03 NOTE — CARE COORDINATION
CASE MANAGEMENT DISCHARGE SUMMARY      Discharge to: home w/[facility]    IMM given: (date) 9/2/2021    New Durable Medical Equipment ordered/agency: none    Transportation: private\  Confirmed discharge plan with:     Patient: yes     RN, name: Zain Salcedo RN    Note: Pt given resources to get assistance w/arm devices that reads blood sugars. HHC ordered per [facility] CM request.  Pt has transport home. Orders faxed to [facility].   Osmel Harrison RN

## 2021-09-03 NOTE — DISCHARGE INSTR - COC
Continuity of Care Form    Patient Name: Ike Sheikh   :  1970  MRN:  [id number]    Admit date:  2021  Discharge date:  9/3/2021    Code Status Order: Full Code   Advance Directives:      Admitting Physician:  Ruby Perez DO  PCP: Mercedez Winston MD    Discharging Nurse: Melanie Louie RN, [facility] Unit/Room#: 0219/0219-01  Discharging Unit Phone Number: [phone]    Emergency Contact:   Extended Emergency Contact Information  Primary Emergency Contact: Crystal Ann  Address: [address] [place] [address] [address] [facility], [address] [facility] [facility] of [address] [place] [address] Phone: [phone]  Mobile Phone: [phone]  Relation: Spouse    Past Surgical History:  Past Surgical History:   Procedure Laterality Date    AORTIC VALVE REPLACEMENT N/A 10/15/2019    TRANSCATHETER AORTIC VALVE REPLACEMENT FEMORAL APPROACH performed by Ratna Gtz MD at [address] [place] [address] Right 2019    PERITONEAL DIALYSIS CATHETER REMOVAL performed by Kathy Granger MD at [address] [place] [address]      WNL    CORONARY ANGIOPLASTY WITH STENT PLACEMENT  05/26/15    CYST REMOVAL  2013    EXCISION CYSTS, NECK X2 AND ABDOMINAL benign    DIAGNOSTIC CARDIAC CATH LAB PROCEDURE      DIALYSIS FISTULA CREATION Left 10/30/2017    LEFT BRACHIAL CEPHALIC FISTULA    DIALYSIS FISTULA CREATION Left 3/27/2019    LIGATION  AV FISTULA performed by Ki Rivas MD at [address] [place] [address], [facility]      OTHER SURGICAL HISTORY  2017    laparoscopic cholecystectomy with intraoperative cholangiogram    OTHER SURGICAL HISTORY  2018    PORT PLACEMENT  - vas cath    OTHER SURGICAL HISTORY Bilateral 2018    laprascopic peritoneal dialysis catheter placement    OTHER SURGICAL HISTORY Right 2018    peritoneal dialysis port placed on right side of abdomen    OTHER SURGICAL HISTORY  2019    PTA/Stenting R External Iliac artery    CT LAP INSERTION TUNNELED INTRAPERITONEAL CATHETER N/A 9/21/2018    LAPAROSCOPIC PERITONEAL DIALYSIS CATHETER REPLACEMENT performed by Alexey Hernandez MD at [facility] ENDOSCOPY  01/06/2016    UPPER GASTROINTESTINAL ENDOSCOPY  01/29/2017    possible candida, otherwise normal appearing    VASCULAR SURGERY  aprx 2 years ago    2 stents placed, each side of groin       Immunization History:   Immunization History   Administered Date(s) Administered    COVID-19, Moderna, PF, 100mcg/0.5mL 03/26/2021, 04/23/2021    Hepatitis B Adult (Engerix-B) 11/18/2017, 06/13/2018, 07/13/2018    Influenza Virus Vaccine 12/27/2012, 10/07/2014, 11/20/2015, 10/16/2019    Influenza, Intradermal, Quadrivalent, Preservative Free 11/16/2016    Influenza, MDCK Quadv, IM, PF (Flucelvax 2 yrs and older) 10/14/2017, 09/30/2020    Influenza, Hassel Scarce, 6 mo and older, IM, PF (Flulaval, Fluarix) 09/15/2018    Influenza, Quadv, IM, PF (6 mo and older Fluzone, Flulaval, Fluarix, and 3 yrs and older Afluria) 10/16/2019    PPD Test 11/09/2017, 11/16/2017, 01/03/2019, 01/06/2020, 01/15/2021    Pneumococcal Conjugate 13-valent (Cityqmf09) 10/14/2017    Pneumococcal Polysaccharide (Zseczzcql35) 10/07/2014, 11/19/2019    Tdap (Boostrix, Adacel) 02/13/2020    Zoster Recombinant (Shingrix) 02/13/2020       Active Problems:  Patient Active Problem List   Diagnosis Code    Acute respiratory failure with hypoxia and hypercapnia ([facility]) J96.01, J96.02    Chronic obstructive pulmonary disease ([facility]) J44.9    CAD S/P percutaneous coronary angioplasty I25.10, Z98.61    PVD (peripheral vascular disease) ([facility]) I73.9    Nonischemic cardiomyopathy ([facility]) I42.8    Sleep apnea G47.30    Bicuspid aortic valve Q23.1    Bilateral hilar adenopathy syndrome R59.0    Claudication in peripheral vascular disease ([facility]) I73.9    Essential hypertension I10    Diabetic neuropathy ([facility]) E11.40    Type 2 diabetes, uncontrolled, with neuropathy ([facility] E11.40, E11.65    Passive smoke exposure Z77.22    Depression with anxiety F41.8    Hematoma T14. 8XXA    Pneumonia of right upper lobe due to infectious organism J18.9    DM (diabetes mellitus), secondary, uncontrolled, w/neurologic complic ([facility] K10.80, H46.21    Hypertensive heart disease with congestive heart failure with preserved left ventricular function (HCC) I11.0, I50.30    CHF exacerbation ([facility]) I50.9    Chest pain R07.9    Coronary artery disease involving native coronary artery of native heart without angina pectoris I25.10    Obesity (BMI 30-39. 9) E66.9    ZAINAB on CPAP G47.33, Z99.89    Degeneration of lumbar or lumbosacral intervertebral disc M51.37    Lumbar radiculopathy M54.16    Lumbosacral spondylosis without myelopathy G31.059    Biliary colic V75.90    Symptomatic cholelithiasis K80.20    Gastroparesis due to DM ([facility]) E11.43, K31.84    Angina, class IV ([facility]) I20.9    Dyspnea R06.00    Elevated brain natriuretic peptide (BNP) level R79.89    Dyslipidemia E78.5    Respiratory distress R06.03    Hypoxia R09.02    Chest pressure R07.89    Hypertensive urgency I16.0    Acute on chronic combined systolic and diastolic heart failure ([facility]) I50.43    Ischemic cardiomyopathy I25.5    Chronic renal failure, stage 5 ([facility]) N18.5    Tobacco abuse Z72.0    CKD stage 4 due to type 2 diabetes mellitus ([facility]) E11.22, N18.4    CVA (cerebral vascular accident) ([facility] I63.9    Arterial ischemic stroke, ICA, right, acute ([facility]) I63.231    Type 2 diabetes mellitus without complication, without long-term current use of insulin ([facility]) E11.9    HTN (hypertension), benign I10    ZAINAB (obstructive sleep apnea) G47.33    Diarrhea R19.7    Pleural effusion J90    Chronic anemia D64.9    Nonrheumatic aortic valve stenosis I35.0    Hypervolemia E87.70    Hyperkalemia E87.5    Obesity E66.9    Mucus plugging of bronchi T17.500A    Hemodialysis-associated hypotension I95.3    Left arm pain M79.602    Dizziness R42    ESRD (end stage renal disease) on dialysis ([facility]) N18.6, Z99.2    Hypotension due to drugs I95.2    Acute diastolic CHF (congestive heart failure) ([facility]) I50.31    Neuromuscular disorder ([facility]) G70.9    Acute combined systolic and diastolic CHF, NYHA class 4 ([facility]) I50.41    Renovascular hypertension I15.0    Mixed hyperlipidemia E78.2    Cigarette nicotine dependence in remission F17.211    Acute respiratory failure ([facility]) J96.00    Pulmonary edema J81.1    Fluid overload H36.43    Diastolic dysfunction C82.66    Anemia of chronic disease D63.8    SOB (shortness of breath) R06.02    Steal syndrome of dialysis vascular access ([facility]) T82.898A    Chronic, continuous use of opioids F11.90    Chronic bronchitis ([facility]) J42    Nasal congestion R09.81    Hypercholesteremia E78.00    Bradycardia R00.1    S/P TAVR (transcatheter aortic valve replacement) Z95.2    Syncope and collapse R55    Atrial fibrillation ([facility]) I48.91    Former smoker Z87.891    Generalized weakness R53.1    DKA, type 1, not at goal [facility]) E10.10    Bilateral leg weakness R29.898    GBS (Guillain-[place] syndrome) ([facility]) G61.0    Sinus pause I45.5    Weakness of both lower extremities R29.898    Sinus bradycardia R00.1    Ataxia R27.0    Peripheral vascular occlusive disease ([facility]) I73.9    Cellulitis of right foot L03.115    Iliac artery occlusion, right ([facility]) I74.5    Cellulitis and abscess of hand L03.119, L02.519    Type 2 diabetes mellitus with hyperglycemia ([facility]) E11.65    Acute encephalopathy G93.40    Acute hypoxemic respiratory failure ([facility]) J96.01    CHF (congestive heart failure) ([facility]) I50.9       Isolation/Infection:   Isolation            No Isolation          Patient Infection Status       Infection Onset Added Last Indicated Last Indicated By Review Planned Expiration Resolved Resolved By    None active    Resolved    COVID-19 Rule Out 08/29/21 08/29/21 08/29/21 COVID-19 (Ordered)   08/29/21 Rule-Out Test Resulted    COVID-19 Rule Out 12/18/20 12/18/20 12/18/20 COVID-19 (Ordered)   12/18/20 Rule-Out Test Resulted    COVID-19 Rule Out 05/04/20 05/04/20 05/04/20 COVID-19 (Ordered)   05/04/20 Rule-Out Test Resulted            Nurse Assessment:  Last Vital Signs: /81   Pulse 92   Temp 98.1 °F (36.7 °C)   Resp 18   Ht 5' 7\" (1.702 m)   Wt 260 lb 12.9 oz (118.3 kg)   SpO2 93%   BMI 40.85 kg/m²     Last documented pain score (0-10 scale): Pain Level: 0  Last Weight:   Wt Readings from Last 1 Encounters:   09/01/21 260 lb 12.9 oz (118.3 kg)     Mental Status:  oriented, alert and coherent    IV Access:  - Dialysis Catheter  - site  left and CHEST, insertion date: PRIOR TO ADMISSION    Nursing Mobility/ADLs:  Walking   Independent  Transfer  Independent  [address] [facility]  [facility]   whole    Wound Care Documentation and Therapy:  Wound 12/31/20 Foot Right (Active)   Wound Image   08/30/21 1522   Wound Etiology Diabetic 09/03/21 1030   Dressing Status Clean;Dry; Intact 09/03/21 1030   Wound Cleansed Cleansed with saline 09/03/21 1030   Dressing/Treatment Hydrating gel; Foam 09/03/21 1030   Dressing Change Due 09/04/21 09/03/21 1030   Wound Length (cm) 1 cm 08/30/21 1522   Wound Width (cm) 1 cm 08/30/21 1522   Wound Depth (cm) 0.1 cm 08/30/21 1522   Wound Surface Area (cm^2) 1 cm^2 08/30/21 1522   Change in Wound Size % (l*w) 66.67 08/30/21 1522   Wound Volume (cm^3) 0.1 cm^3 08/30/21 1522   Wound Assessment Pink/red 09/03/21 1030   Drainage Amount Scant 09/03/21 1030   Drainage Description Serosanguinous 09/03/21 1030   Odor None 09/03/21 1030   Liberty-wound Assessment Dry/flaky 09/03/21 1030   Number of days: 245       Wound 08/30/21 Toe (Comment  which one) Right Great Toe (Active)   Wound Image   08/30/21 1524   Wound Etiology Traumatic 09/03/21 1030 Dressing Status Clean;Dry; Intact 09/03/21 1030   Wound Cleansed Cleansed with saline 09/03/21 1030   Dressing/Treatment Hydrating gel; Foam 09/03/21 1030   Dressing Change Due 09/04/21 09/03/21 1030   Wound Length (cm) 0.3 cm 08/30/21 1524   Wound Width (cm) 0.5 cm 08/30/21 1524   Wound Depth (cm) 0.1 cm 08/30/21 1524   Wound Surface Area (cm^2) 0.15 cm^2 08/30/21 1524   Wound Volume (cm^3) 0.015 cm^3 08/30/21 1524   Wound Assessment Pink/red 09/03/21 1030   Drainage Amount None 09/03/21 1030   Drainage Description Sanguinous 09/03/21 1030   Odor None 09/03/21 1030   Liberty-wound Assessment Dry/flaky 09/03/21 1030   Number of days: 3        Elimination:  Continence:   · Bowel: Yes  · Bladder: Yes  Urinary Catheter: None   Colostomy/Ileostomy/Ileal Conduit: No       Date of Last BM:     Intake/Output Summary (Last 24 hours) at 9/3/2021 1315  Last data filed at 9/3/2021 1030  Gross per 24 hour   Intake 890 ml   Output 400 ml   Net 490 ml     I/O last 3 completed shifts: In: 5262 [P.O.:1420]  Out: 400 [Urine:400]    Safety Concerns: At Risk for Falls    Impairments/Disabilities:      None    Nutrition Therapy:  Current Nutrition Therapy:   - Oral Diet:  Carb Control 4 carbs/meal (1800kcals/day) and Renal    Routes of Feeding: Oral  Liquids: Thin Liquids  Daily Fluid Restriction: yes - amount 1000  Last Modified Barium Swallow with Video (Video Swallowing Test): not done    Treatments at the Time of Hospital Discharge:   Respiratory Treatments:PRN  Oxygen Therapy:  is not on home oxygen therapy.   Ventilator:    - No ventilator support    Rehab Therapies: Physical Therapy and Occupational Therapy  Weight Bearing Status/Restrictions: No weight bearing restirctions  Other Medical Equipment (for information only, NOT a DME order):  N/A  Other Treatments: WOUND CARE BLE    Patient's personal belongings (please select all that are sent with patient):  None    RN SIGNATURE:  Electronically signed by Deena Lo RN on 9/3/21 at 2:28 PM EDT    CASE MANAGEMENT/SOCIAL WORK SECTION    Inpatient Status Date: ***    Readmission Risk Assessment Score:  Readmission Risk              Risk of Unplanned Readmission:  76           Discharging to Facility/ [address] [place] [address] [address] [facility]     Dialysis Facility (if applicable)   · Name:  · Address:  · Dialysis Schedule:  · Phone:  · Fax:    / signature: Electronically signed by Opal Jaramillo RN on 9/3/21 at 1:28 PM EDT    PHYSICIAN SECTION    Prognosis: Fair    Condition at Discharge: Stable    Rehab Potential (if transferring to Rehab): Fair    Recommended Labs or Other Treatments After Discharge: Home with home care    Physician Certification: I certify the above information and transfer of Norvel Sheets  is necessary for the continuing treatment of the diagnosis listed and that he requires [address] for Less than  30 days.      Update Admission H&P: No change in H&P    PHYSICIAN SIGNATURE:  Electronically signed by Rebeca Flores MD on 9/3/21 at 1:16 PM EDT

## 2021-09-03 NOTE — DISCHARGE SUMMARY
Hospital Medicine Discharge Summary    Patient ID: Don Keene      Patient's PCP: Ronnie Mcdaniel MD    Admit Date: 8/29/2021     Discharge Date:  09/03/21    Admitting Physician: Kellie Casey DO     Discharge Physician: Lana Galdamez MD     Discharge Diagnoses: Active Hospital Problems    Diagnosis     Acute on chronic combined systolic and diastolic heart failure (HCC) [I50.43]      Priority: High    Type 2 diabetes, uncontrolled, with neuropathy (HCC) [E11.40, E11.65]      Priority: High    Essential hypertension [I10]      Priority: Medium    Chronic obstructive pulmonary disease (HCC) [J44.9]      Priority: Medium    Obesity [E66.9]     Acute respiratory failure (HCC) [J96.00]     ZAINAB on CPAP [G47.33, Z99.89]     Coronary artery disease involving native coronary artery of native heart without angina pectoris [I25.10]        The patient was seen and examined on day of discharge and this discharge summary is in conjunction with any daily progress note from day of discharge. Hospital Course: By Problem List   Sepsis in patient with acute hypoxemic respiratory failure [facility] 2/2  pulmonary edema /Pneumonia possibility could not be entirely ruled Out   -Met sepsis criteria with WBC 16.4, , lactic acid 2.8 on admission. SPO2 80% on  RA requiring   CPAP on admission ; clinically sepsis syndrome resolved. - hypoxemic respiratory failure multifactoral with COPD, CHF, restrictive disease caused by obesity and fluid overload d/t missing dialysis  -CXR revealed:  Perihilar and right basilar opacities.  Correlate with presentation for pulmonary edema.  Correlate clinically to exclude underlying pneumonia  -30ml/kg IVF not given on admission d/t ESRD and respiratory status.  -vancomycin and cefepime given in ED and continued through 9/1/2021 and transitioned to p.o.  Augmentin to complete 5-day course.  -blood cultures ordered in ED -no growth to date ; sputum cultures NGTD (118.3 kg)   SpO2 93%   BMI 40.85 kg/m²       General appearance:  No apparent distress, appears stated age and cooperative. HEENT:  Normal cephalic, atraumatic without obvious deformity. Pupils equal, round, and reactive to light. Extra ocular muscles intact. Conjunctivae/corneas clear. Neck: Supple, with full range of motion. No jugular venous distention. Trachea midline. Respiratory:  Normal respiratory effort. Clear to auscultation, bilaterally without Rales/Wheezes/Rhonchi. Cardiovascular:  Regular rate and rhythm with normal S1/S2 without murmurs, rubs or gallops. Abdomen: Soft, non-tender, non-distended with normal bowel sounds. Musculoskeletal:  No clubbing, cyanosis or edema bilaterally. Full range of motion without deformity. Skin: Skin color, texture, turgor normal.  No rashes or lesions. Neurologic:  Neurovascularly intact without any focal sensory/motor deficits. Cranial nerves: II-XII intact, grossly non-focal.  Psychiatric:  Alert and oriented, thought content appropriate, normal insight  Capillary Refill: Brisk,< 3 seconds   Peripheral Pulses: +2 palpable, equal bilaterally       Labs: For convenience and continuity at follow-up the following most recent labs are provided:      CBC:    Lab Results   Component Value Date    WBC 10.4 09/03/2021    HGB 10.7 09/03/2021    HCT 31.4 09/03/2021     09/03/2021       Renal:    Lab Results   Component Value Date     09/03/2021    K 4.2 09/03/2021    K 4.9 08/29/2021    CL 90 09/03/2021    CO2 21 09/03/2021    BUN 54 09/03/2021    CREATININE 6.1 09/03/2021    CALCIUM 9.1 09/03/2021    PHOS 6.6 08/15/2021         Significant Diagnostic Studies    Radiology:   XR CHEST PORTABLE   Final Result   Stable perihilar edema or infiltrate. Questionable left basilar infiltrate. XR CHEST PORTABLE   Final Result   Perihilar and right basilar opacities. Correlate with presentation for   pulmonary edema.   Correlate clinically to exclude underlying pneumonia. Consults:   IP CONSULT TO HOSPITALIST  IP CONSULT TO PHARMACY  IP CONSULT TO NEPHROLOGY  IP CONSULT TO HOME CARE NEEDS    Disposition: Home with home care    Condition at Discharge: Stable    Discharge Instructions/Follow-up: PCP , Podiatry and Nephrology as instructed     Code Status:  Full Code     Activity: activity as tolerated    Diet: diabetic diet and renal diet      Discharge Medications:   Current Discharge Medication List           Details   amoxicillin-clavulanate (AUGMENTIN) 875-125 MG per tablet Take 1 tablet by mouth every 12 hours for 2 days  Qty: 4 tablet, Refills: 0              Details   torsemide (DEMADEX) 100 MG tablet Take 1 tablet by mouth daily  Qty: 30 tablet, Refills: 3              Details   Calcium Acetate, Phos Binder, 667 MG CAPS TAKE 1 CAPSULE BY MOUTH THREE TIMES DAILY WITH MEALS  Qty: 90 capsule, Refills: 3      pravastatin (PRAVACHOL) 40 MG tablet TAKE (1) TABLET BY MOUTH IN THE EVENING  Qty: 30 tablet, Refills: 1      QUEtiapine (SEROQUEL) 50 MG tablet TAKE (1) TABLET BY MOUTH IN THE EVENING  Qty: 30 tablet, Refills: 2    Associated Diagnoses: Insomnia, unspecified type; Mood disorder (HCC)      BASAGLAR KWIKPEN 100 UNIT/ML injection pen ADMINISTER 60 UNITS UNDER THE SKIN EVERY NIGHT  Qty: 15 mL, Refills: 5    Associated Diagnoses: Type 2 diabetes mellitus with diabetic nephropathy, with long-term current use of insulin (HCC)      hydrocortisone (ANUSOL-HC) 2.5 % CREA rectal cream Place rectally 2 times daily  Qty: 30 g, Refills: 0    Associated Diagnoses: Other hemorrhoids      albuterol (PROVENTIL) (2.5 MG/3ML) 0.083% nebulizer solution INHALE 1 VIAL VIA NEBULIZER EVERY 6 HOURS AS NEEDED FOR WHEEZING  Qty: 300 mL, Refills: 5      nystatin (MYCOSTATIN) 325178 UNIT/GM cream Apply topically 2 times daily.   Qty: 60 g, Refills: 3    Associated Diagnoses: Yeast dermatitis      famotidine (PEPCID) 20 MG tablet TAKE 1 TABLET BY MOUTH TWICE DAILY AS NEEDED FOR HEARTBURN  Qty: 180 tablet, Refills: 0    Comments: **Patient requests 90 days supply**  Associated Diagnoses: Peripheral polyneuropathy      hydrOXYzine (VISTARIL) 50 MG capsule TAKE 1 TO 2 CAPSULES BY MOUTH NIGHTLY  Qty: 60 capsule, Refills: 5      LINZESS 145 MCG capsule TAKE 1 CAPSULE BY MOUTH EVERY MORNING BEFORE BREAKFAST  Qty: 30 capsule, Refills: 10    Associated Diagnoses: Chronic idiopathic constipation      hydrALAZINE (APRESOLINE) 50 MG tablet TAKE 1/2 TABLET BY MOUTH EVERY 8 HOURS  Qty: 90 tablet, Refills: 2      traZODone (DESYREL) 150 MG tablet TAKE (1) TABLET BY MOUTH NIGHTLY  Qty: 30 tablet, Refills: 10      dilTIAZem (CARDIZEM CD) 180 MG extended release capsule       cyclobenzaprine (FLEXERIL) 10 MG tablet TAKE 1 TABLET BY MOUTH EVERY 8 HOURS AS NEEDED  Qty: 90 tablet, Refills: 5      DULoxetine (CYMBALTA) 30 MG extended release capsule TAKE 1 CAPSULE BY MOUTH EVERY DAY  Qty: 30 capsule, Refills: 10    Associated Diagnoses: Depression, unspecified depression type      tiZANidine (ZANAFLEX) 4 MG tablet TAKE 1 TABLET BY MOUTH THREE TIMES DAILY  Qty: 90 tablet, Refills: 10      losartan (COZAAR) 50 MG tablet TAKE 1 TABLET BY MOUTH DAILY  Qty: 30 tablet, Refills: 10      pantoprazole (PROTONIX) 40 MG tablet TAKE (1) TABLET BY MOUTH EACH MORNING BEFORE BREAKFAST  Qty: 90 tablet, Refills: 1      simethicone (MYLICON) 80 MG chewable tablet Take 1 tablet by mouth every 6 hours as needed (cramping)  Qty: 15 tablet, Refills: 0      !! blood glucose test strips (GLUCOSE METER TEST) strip 1 each by In Vitro route 5 times daily As needed. Qty: 100 each, Refills: 3      nitroGLYCERIN (NITROSTAT) 0.4 MG SL tablet DISSOLVE 1 TABLET UNDER THE TONGUE AS NEEDED FOR CHEST PAIN EVERY 5 MINUTES UP TO 3 TIMES. IF NO RELIEF CALL 911.   Qty: 25 tablet, Refills: 10    Associated Diagnoses: Coronary artery disease involving native heart without angina pectoris, unspecified vessel or lesion type      B Complex-C-Folic kit, Refills: 0    Comments: Which ever is coverd. !! glucose monitoring kit (FREESTYLE) monitoring kit 1 kit by Does not apply route daily  Qty: 1 kit, Refills: 0    Comments: Please dispense what is covered by insurance      Tiotropium Bromide-Olodaterol (STIOLTO RESPIMAT) 2.5-2.5 MCG/ACT AERS Inhale 2 puffs into the lungs daily  Qty: 2 Inhaler, Refills: 0    Comments: 2 samples given:  Lot #[id number], Exp 7/21 & Lot #[id number], Exp 7/21      Polyethylene Glycol 3350 GRAN       Blood Glucose Monitoring Suppl ADAM USE AS DIRECTED. Qty: 1 Device, Refills: 0    Comments: WITH CONTROL SOLUTION. Alcohol Swabs PADS USE AS DIRECTED  Qty: 300 each, Refills: 3       !! - Potential duplicate medications found. Please discuss with provider. Time Spent on discharge is more than 30 minutes in the examination, evaluation, counseling and review of medications and discharge plan. Signed:    Carolina Dumas MD   9/3/2021      Thank you Angelique Stone MD for the opportunity to be involved in this patient's care. If you have any questions or concerns please feel free to contact me at [phone].

## 2021-09-03 NOTE — TELEPHONE ENCOUNTER
Last Office Visit  -  6/22/21  Next Office Visit  -  None    Last Filled  -    Last UDS -    Contract -

## 2021-09-03 NOTE — PROGRESS NOTES
09/02/21 2222   NIV Type   Skin Assessment Clean, dry, & intact   Skin Protection for O2 Device Yes   Location Nose   NIV Started/Stopped On   Equipment Type v60   Mode CPAP   Mask Type Full face mask   Mask Size Large   Settings/Measurements   CPAP/EPAP 14 cmH2O   Resp 23   FiO2  40 %   Vt Exhaled 632 mL   Minute Volume 12.1 Liters   Mask Leak (lpm) 20 lpm   Comfort Level Good   Using Accessory Muscles No

## 2021-09-03 NOTE — PROGRESS NOTES
Patient educated on all discharge orders including medications, prescriptions, follow up appointments and general nursing care interventions. Patient's IV access removed; HD access remains in place for HD MWF. Patient's belongings sent with patient. Patient verbalized understanding of all discharge instructions. Patient discharged via personal vehicle in stable condition.

## 2021-09-07 ENCOUNTER — HOSPITAL ENCOUNTER (INPATIENT)
Age: 51
LOS: 5 days | Discharge: HOME | DRG: 064 | End: 2021-09-12
Attending: EMERGENCY MEDICINE | Admitting: INTERNAL MEDICINE
Payer: COMMERCIAL

## 2021-09-07 ENCOUNTER — NURSE TRIAGE (OUTPATIENT)
Dept: OTHER | Facility: CLINIC | Age: 51
End: 2021-09-07

## 2021-09-07 ENCOUNTER — APPOINTMENT (OUTPATIENT)
Dept: CT IMAGING | Age: 51
DRG: 064 | End: 2021-09-07
Payer: COMMERCIAL

## 2021-09-07 ENCOUNTER — APPOINTMENT (OUTPATIENT)
Dept: GENERAL RADIOLOGY | Age: 51
DRG: 064 | End: 2021-09-07
Payer: COMMERCIAL

## 2021-09-07 DIAGNOSIS — R47.9 SPEECH PROBLEM: Primary | ICD-10-CM

## 2021-09-07 DIAGNOSIS — N39.0 URINARY TRACT INFECTION WITH HEMATURIA, SITE UNSPECIFIED: ICD-10-CM

## 2021-09-07 DIAGNOSIS — L03.90 CELLULITIS, UNSPECIFIED CELLULITIS SITE: ICD-10-CM

## 2021-09-07 DIAGNOSIS — N18.6 CHRONIC KIDNEY DISEASE WITH END STAGE RENAL FAILURE ON DIALYSIS (HCC): ICD-10-CM

## 2021-09-07 DIAGNOSIS — R20.2 LEFT FACE AND LEFT ARM TINGLING: ICD-10-CM

## 2021-09-07 DIAGNOSIS — R31.9 URINARY TRACT INFECTION WITH HEMATURIA, SITE UNSPECIFIED: ICD-10-CM

## 2021-09-07 DIAGNOSIS — Z99.2 CHRONIC KIDNEY DISEASE WITH END STAGE RENAL FAILURE ON DIALYSIS (HCC): ICD-10-CM

## 2021-09-07 PROBLEM — I63.9 ACUTE CEREBROVASCULAR ACCIDENT (CVA) (HCC): Status: ACTIVE | Noted: 2021-09-07

## 2021-09-07 LAB
A/G RATIO: 1.2 (ref 1.1–2.2)
ALBUMIN SERPL-MCNC: 3.8 G/DL (ref 3.4–5)
ALP BLD-CCNC: 76 U/L (ref 40–129)
ALT SERPL-CCNC: 17 U/L (ref 10–40)
ANION GAP SERPL CALCULATED.3IONS-SCNC: 16 MMOL/L (ref 3–16)
ANISOCYTOSIS: ABNORMAL
AST SERPL-CCNC: 16 U/L (ref 15–37)
BACTERIA: ABNORMAL /HPF
BANDED NEUTROPHILS RELATIVE PERCENT: 17 % (ref 0–7)
BASOPHILS ABSOLUTE: 0 K/UL (ref 0–0.2)
BASOPHILS RELATIVE PERCENT: 0 %
BILIRUB SERPL-MCNC: <0.2 MG/DL (ref 0–1)
BILIRUBIN URINE: NEGATIVE
BLOOD, URINE: ABNORMAL
BUN BLDV-MCNC: 49 MG/DL (ref 7–20)
CALCIUM SERPL-MCNC: 9.1 MG/DL (ref 8.3–10.6)
CHLORIDE BLD-SCNC: 94 MMOL/L (ref 99–110)
CLARITY: CLEAR
CO2: 20 MMOL/L (ref 21–32)
COLOR: YELLOW
CREAT SERPL-MCNC: 6.1 MG/DL (ref 0.9–1.3)
EKG ATRIAL RATE: 76 BPM
EKG DIAGNOSIS: NORMAL
EKG P AXIS: 49 DEGREES
EKG P-R INTERVAL: 174 MS
EKG Q-T INTERVAL: 412 MS
EKG QRS DURATION: 96 MS
EKG QTC CALCULATION (BAZETT): 463 MS
EKG R AXIS: 5 DEGREES
EKG T AXIS: 83 DEGREES
EKG VENTRICULAR RATE: 76 BPM
EOSINOPHILS ABSOLUTE: 0.4 K/UL (ref 0–0.6)
EOSINOPHILS RELATIVE PERCENT: 3 %
EPITHELIAL CELLS, UA: ABNORMAL /HPF (ref 0–5)
GFR AFRICAN AMERICAN: 12
GFR NON-AFRICAN AMERICAN: 10
GLOBULIN: 3.2 G/DL
GLUCOSE BLD-MCNC: 113 MG/DL (ref 70–99)
GLUCOSE BLD-MCNC: 130 MG/DL (ref 70–99)
GLUCOSE URINE: 100 MG/DL
HCT VFR BLD CALC: 32 % (ref 40.5–52.5)
HEMOGLOBIN: 10.6 G/DL (ref 13.5–17.5)
KETONES, URINE: NEGATIVE MG/DL
LACTIC ACID: 0.8 MMOL/L (ref 0.4–2)
LEUKOCYTE ESTERASE, URINE: NEGATIVE
LYMPHOCYTES ABSOLUTE: 1.2 K/UL (ref 1–5.1)
LYMPHOCYTES RELATIVE PERCENT: 9 %
MACROCYTES: ABNORMAL
MCH RBC QN AUTO: 30.2 PG (ref 26–34)
MCHC RBC AUTO-ENTMCNC: 33.2 G/DL (ref 31–36)
MCV RBC AUTO: 90.9 FL (ref 80–100)
METAMYELOCYTES RELATIVE PERCENT: 1 %
MICROCYTES: ABNORMAL
MICROSCOPIC EXAMINATION: YES
MONOCYTES ABSOLUTE: 0.4 K/UL (ref 0–1.3)
MONOCYTES RELATIVE PERCENT: 3 %
MUCUS: ABNORMAL /LPF
MYELOCYTE PERCENT: 1 %
NEUTROPHILS ABSOLUTE: 11.3 K/UL (ref 1.7–7.7)
NEUTROPHILS RELATIVE PERCENT: 66 %
NITRITE, URINE: NEGATIVE
OVALOCYTES: ABNORMAL
PDW BLD-RTO: 16.6 % (ref 12.4–15.4)
PERFORMED ON: ABNORMAL
PH UA: 6.5 (ref 5–8)
PLATELET # BLD: 340 K/UL (ref 135–450)
PMV BLD AUTO: 8.2 FL (ref 5–10.5)
POIKILOCYTES: ABNORMAL
POLYCHROMASIA: ABNORMAL
POTASSIUM SERPL-SCNC: 4.9 MMOL/L (ref 3.5–5.1)
PROTEIN UA: >=300 MG/DL
RBC # BLD: 3.52 M/UL (ref 4.2–5.9)
RBC UA: ABNORMAL /HPF (ref 0–4)
SODIUM BLD-SCNC: 130 MMOL/L (ref 136–145)
SPECIFIC GRAVITY UA: 1.01 (ref 1–1.03)
STOMATOCYTES: ABNORMAL
TOTAL PROTEIN: 7 G/DL (ref 6.4–8.2)
TROPONIN: 0.15 NG/ML
URINE TYPE: ABNORMAL
UROBILINOGEN, URINE: 0.2 E.U./DL
WBC # BLD: 13.3 K/UL (ref 4–11)
WBC UA: ABNORMAL /HPF (ref 0–5)

## 2021-09-07 PROCEDURE — 83605 ASSAY OF LACTIC ACID: CPT

## 2021-09-07 PROCEDURE — 2580000003 HC RX 258: Performed by: PHYSICIAN ASSISTANT

## 2021-09-07 PROCEDURE — 96365 THER/PROPH/DIAG IV INF INIT: CPT

## 2021-09-07 PROCEDURE — 70450 CT HEAD/BRAIN W/O DYE: CPT

## 2021-09-07 PROCEDURE — 93010 ELECTROCARDIOGRAM REPORT: CPT | Performed by: INTERNAL MEDICINE

## 2021-09-07 PROCEDURE — 80053 COMPREHEN METABOLIC PANEL: CPT

## 2021-09-07 PROCEDURE — 87086 URINE CULTURE/COLONY COUNT: CPT

## 2021-09-07 PROCEDURE — 84484 ASSAY OF TROPONIN QUANT: CPT

## 2021-09-07 PROCEDURE — 81001 URINALYSIS AUTO W/SCOPE: CPT

## 2021-09-07 PROCEDURE — 1200000000 HC SEMI PRIVATE

## 2021-09-07 PROCEDURE — 6370000000 HC RX 637 (ALT 250 FOR IP): Performed by: NURSE PRACTITIONER

## 2021-09-07 PROCEDURE — 87040 BLOOD CULTURE FOR BACTERIA: CPT

## 2021-09-07 PROCEDURE — 6360000002 HC RX W HCPCS: Performed by: PHYSICIAN ASSISTANT

## 2021-09-07 PROCEDURE — 71045 X-RAY EXAM CHEST 1 VIEW: CPT

## 2021-09-07 PROCEDURE — 93005 ELECTROCARDIOGRAM TRACING: CPT | Performed by: PHYSICIAN ASSISTANT

## 2021-09-07 PROCEDURE — 6370000000 HC RX 637 (ALT 250 FOR IP): Performed by: INTERNAL MEDICINE

## 2021-09-07 PROCEDURE — 99285 EMERGENCY DEPT VISIT HI MDM: CPT

## 2021-09-07 PROCEDURE — 6370000000 HC RX 637 (ALT 250 FOR IP): Performed by: PHYSICIAN ASSISTANT

## 2021-09-07 PROCEDURE — 2580000003 HC RX 258: Performed by: INTERNAL MEDICINE

## 2021-09-07 PROCEDURE — 85025 COMPLETE CBC W/AUTO DIFF WBC: CPT

## 2021-09-07 RX ORDER — LOSARTAN POTASSIUM 25 MG/1
50 TABLET ORAL DAILY
Status: DISCONTINUED | OUTPATIENT
Start: 2021-09-08 | End: 2021-09-10

## 2021-09-07 RX ORDER — HEPARIN SODIUM 5000 [USP'U]/ML
5000 INJECTION, SOLUTION INTRAVENOUS; SUBCUTANEOUS EVERY 8 HOURS SCHEDULED
Status: DISCONTINUED | OUTPATIENT
Start: 2021-09-07 | End: 2021-09-12 | Stop reason: HOSPADM

## 2021-09-07 RX ORDER — DULOXETIN HYDROCHLORIDE 30 MG/1
30 CAPSULE, DELAYED RELEASE ORAL DAILY
Status: DISCONTINUED | OUTPATIENT
Start: 2021-09-08 | End: 2021-09-12 | Stop reason: HOSPADM

## 2021-09-07 RX ORDER — OXYCODONE AND ACETAMINOPHEN 7.5; 325 MG/1; MG/1
1 TABLET ORAL
Qty: 150 TABLET | Refills: 0 | OUTPATIENT
Start: 2021-09-07 | End: 2021-10-07

## 2021-09-07 RX ORDER — ONDANSETRON 2 MG/ML
4 INJECTION INTRAMUSCULAR; INTRAVENOUS EVERY 6 HOURS PRN
Status: DISCONTINUED | OUTPATIENT
Start: 2021-09-07 | End: 2021-09-12 | Stop reason: HOSPADM

## 2021-09-07 RX ORDER — PANTOPRAZOLE SODIUM 40 MG/1
40 TABLET, DELAYED RELEASE ORAL
Status: DISCONTINUED | OUTPATIENT
Start: 2021-09-08 | End: 2021-09-12 | Stop reason: HOSPADM

## 2021-09-07 RX ORDER — POLYETHYLENE GLYCOL 3350 17 G/17G
17 POWDER, FOR SOLUTION ORAL DAILY PRN
Status: DISCONTINUED | OUTPATIENT
Start: 2021-09-07 | End: 2021-09-12 | Stop reason: HOSPADM

## 2021-09-07 RX ORDER — OXYCODONE AND ACETAMINOPHEN 7.5; 325 MG/1; MG/1
1 TABLET ORAL ONCE
Status: COMPLETED | OUTPATIENT
Start: 2021-09-07 | End: 2021-09-07

## 2021-09-07 RX ORDER — INSULIN GLARGINE 100 [IU]/ML
45 INJECTION, SOLUTION SUBCUTANEOUS NIGHTLY
Status: DISCONTINUED | OUTPATIENT
Start: 2021-09-07 | End: 2021-09-12 | Stop reason: HOSPADM

## 2021-09-07 RX ORDER — CITALOPRAM 20 MG/1
40 TABLET ORAL DAILY
Status: DISCONTINUED | OUTPATIENT
Start: 2021-09-08 | End: 2021-09-12 | Stop reason: HOSPADM

## 2021-09-07 RX ORDER — TORSEMIDE 100 MG/1
100 TABLET ORAL DAILY
Status: DISCONTINUED | OUTPATIENT
Start: 2021-09-08 | End: 2021-09-12 | Stop reason: HOSPADM

## 2021-09-07 RX ORDER — IPRATROPIUM BROMIDE AND ALBUTEROL SULFATE 2.5; .5 MG/3ML; MG/3ML
1 SOLUTION RESPIRATORY (INHALATION) EVERY 6 HOURS PRN
Status: DISCONTINUED | OUTPATIENT
Start: 2021-09-07 | End: 2021-09-12 | Stop reason: HOSPADM

## 2021-09-07 RX ORDER — SODIUM CHLORIDE 0.9 % (FLUSH) 0.9 %
5-40 SYRINGE (ML) INJECTION EVERY 12 HOURS SCHEDULED
Status: DISCONTINUED | OUTPATIENT
Start: 2021-09-07 | End: 2021-09-12 | Stop reason: HOSPADM

## 2021-09-07 RX ORDER — ACETAMINOPHEN 500 MG
1000 TABLET ORAL ONCE
Status: COMPLETED | OUTPATIENT
Start: 2021-09-07 | End: 2021-09-07

## 2021-09-07 RX ORDER — ISOSORBIDE MONONITRATE 30 MG/1
30 TABLET, EXTENDED RELEASE ORAL DAILY
Status: DISCONTINUED | OUTPATIENT
Start: 2021-09-08 | End: 2021-09-12 | Stop reason: HOSPADM

## 2021-09-07 RX ORDER — SODIUM CHLORIDE 0.9 % (FLUSH) 0.9 %
5-40 SYRINGE (ML) INJECTION PRN
Status: DISCONTINUED | OUTPATIENT
Start: 2021-09-07 | End: 2021-09-12 | Stop reason: HOSPADM

## 2021-09-07 RX ORDER — HYDRALAZINE HYDROCHLORIDE 25 MG/1
25 TABLET, FILM COATED ORAL EVERY 8 HOURS SCHEDULED
Status: DISCONTINUED | OUTPATIENT
Start: 2021-09-07 | End: 2021-09-12 | Stop reason: HOSPADM

## 2021-09-07 RX ORDER — PRAVASTATIN SODIUM 20 MG
20 TABLET ORAL NIGHTLY
Status: DISCONTINUED | OUTPATIENT
Start: 2021-09-07 | End: 2021-09-08

## 2021-09-07 RX ORDER — ASPIRIN 81 MG/1
81 TABLET, CHEWABLE ORAL DAILY
Status: DISCONTINUED | OUTPATIENT
Start: 2021-09-08 | End: 2021-09-12 | Stop reason: HOSPADM

## 2021-09-07 RX ORDER — QUETIAPINE FUMARATE 25 MG/1
50 TABLET, FILM COATED ORAL NIGHTLY
Status: DISCONTINUED | OUTPATIENT
Start: 2021-09-07 | End: 2021-09-12 | Stop reason: HOSPADM

## 2021-09-07 RX ORDER — ALBUTEROL SULFATE 90 UG/1
2 AEROSOL, METERED RESPIRATORY (INHALATION) EVERY 6 HOURS PRN
Status: DISCONTINUED | OUTPATIENT
Start: 2021-09-07 | End: 2021-09-12 | Stop reason: HOSPADM

## 2021-09-07 RX ORDER — TRAZODONE HYDROCHLORIDE 50 MG/1
150 TABLET ORAL NIGHTLY
Status: DISCONTINUED | OUTPATIENT
Start: 2021-09-07 | End: 2021-09-12 | Stop reason: HOSPADM

## 2021-09-07 RX ORDER — SODIUM CHLORIDE 9 MG/ML
25 INJECTION, SOLUTION INTRAVENOUS PRN
Status: DISCONTINUED | OUTPATIENT
Start: 2021-09-07 | End: 2021-09-12 | Stop reason: HOSPADM

## 2021-09-07 RX ORDER — ONDANSETRON 4 MG/1
4 TABLET, ORALLY DISINTEGRATING ORAL EVERY 8 HOURS PRN
Status: DISCONTINUED | OUTPATIENT
Start: 2021-09-07 | End: 2021-09-12 | Stop reason: HOSPADM

## 2021-09-07 RX ADMIN — ACETAMINOPHEN 1000 MG: 500 TABLET ORAL at 23:34

## 2021-09-07 RX ADMIN — CEFTRIAXONE SODIUM 1000 MG: 1 INJECTION, POWDER, FOR SOLUTION INTRAMUSCULAR; INTRAVENOUS at 17:18

## 2021-09-07 RX ADMIN — QUETIAPINE FUMARATE 50 MG: 25 TABLET ORAL at 23:34

## 2021-09-07 RX ADMIN — SODIUM CHLORIDE, PRESERVATIVE FREE 10 ML: 5 INJECTION INTRAVENOUS at 23:37

## 2021-09-07 RX ADMIN — OXYCODONE HYDROCHLORIDE AND ACETAMINOPHEN 1 TABLET: 7.5; 325 TABLET ORAL at 15:26

## 2021-09-07 RX ADMIN — TRAZODONE HYDROCHLORIDE 150 MG: 50 TABLET ORAL at 23:34

## 2021-09-07 RX ADMIN — INSULIN GLARGINE 45 UNITS: 100 INJECTION, SOLUTION SUBCUTANEOUS at 23:35

## 2021-09-07 RX ADMIN — PRAVASTATIN SODIUM 20 MG: 20 TABLET ORAL at 23:34

## 2021-09-07 ASSESSMENT — PAIN SCALES - GENERAL
PAINLEVEL_OUTOF10: 10
PAINLEVEL_OUTOF10: 10
PAINLEVEL_OUTOF10: 8

## 2021-09-07 NOTE — ED NOTES
Bed: 03  Expected date:   Expected time:   Means of arrival:   Comments:  Harris Antonio RN  09/07/21 5497

## 2021-09-07 NOTE — ED NOTES
Report received care assumed.  Sitting up in be watching t.v. no acute distress noted      Panda Sylvester RN  09/07/21 2071

## 2021-09-07 NOTE — TELEPHONE ENCOUNTER
----- Message from Jammie Deanlai sent at 9/7/2021 10:49 AM EDT -----  Subject: Refill Request    QUESTIONS  Name of Medication? Other - Oxycodone / Paracetamol, 7.5 mg, every 4 hours     Patient-reported dosage and instructions? Oxycodone / Paracetamol, 7.5 mg,   every 4 hours   How many days do you have left? 0  Preferred Pharmacy? [facility] #[id number]  Pharmacy phone number (if available)? [phone]  Additional Information for Provider? Oxycodone / Paracetamol, 7.5 mg,   every 4 hours   ---------------------------------------------------------------------------  --------------  CALL BACK INFO  What is the best way for the office to contact you? OK to leave message on   voicemail  Preferred Call Back Phone Number?  [phone]

## 2021-09-07 NOTE — TELEPHONE ENCOUNTER
Last Office Visit  -  6/22/21  Next Office Visit  -  None    Last Filled  -  8/3/21  Last UDS -  6/26/21  Contract -  None

## 2021-09-07 NOTE — TELEPHONE ENCOUNTER
Last Office Visit  -  6/22/21  Next Office Visit  -      Last Filled  -  8/3/21  Last UDS -  6/26/21  Contract -

## 2021-09-07 NOTE — ED NOTES
Sitting up at side of bed informed of NPO status, upset wanting food. Refused to get into a gown. Call light in reach on monitor.       Curtis Scruggs RN  09/07/21 1954

## 2021-09-07 NOTE — ED PROVIDER NOTES
[facility] Emergency Department    CHIEF COMPLAINT  Numbness (left arm numbness, bilateral hand \"tingling\". Glucose 120 in route per EMS. ) and Aphasia (slurred speech started yesterday morning at 11am, patient states \"they tried to squad me out but I wouldnt let them\". )      [facility]  I have seen and evaluated this patient with my supervising physician, Dr. Reina Blackburn. HISTORY OF PRESENT ILLNESS  Shirley Vargas is a 46 y.o. male who presents to the ED complaining of 1 day history of dizziness and left arm tingling. Patient brought in by squad. Symptoms occurred during dialysis yesterday. He reports that they attempted to get him to come in the hospital however he declined. Risk reports that symptoms have persisted. Denies headache although reports dizziness/lightheadedness upon standing. No headaches or confusion. No visual changes disturbances. Feels that speech is slurred. No difficulty with word finding. He has had no neck or jaw pain. Reports numbness of left arm and fingertips bilaterally. Right sided abdomen/flank discomfort with deep breaths. No cough or congestion. No fevers chills. No hemoptysis. He denies nausea vomiting. No diarrhea or constipation. No leg pain or swelling. No recent travel, trauma or surgery. No other complaints, modifying factors or associated symptoms. Nursing notes reviewed.    Past Medical History:   Diagnosis Date    Ambulatory dysfunction     walker for long distances, SOB with distance    Aortic stenosis     echo 2017    Arthritis     hands and hips    Asthma     Bilateral hilar adenopathy syndrome 6/3/2013    CAD (coronary artery disease)     Dr. [facility]) 04/19/2019    EF= 43%    CHF (congestive heart failure) (HCC)     Chronic pain     COPD (chronic obstructive pulmonary disease) [facility])     pulmonology Dr. Jay Drafts    Depression     Diabetes mellitus ([facility] borderline    Difficult intravenous access     Emphysema of lung ([facility]     ESRD (end stage renal disease) on dialysis (HCC)     MWF    Fear of needles     Gastric ulcer     GERD (gastroesophageal reflux disease)     Heart valve problem     bicuspic valve    Hemodialysis patient ([address]     History of spinal fracture     work incident    Hx of blood clots     Bilateral lower extremities; stents in place    Hyperlipidemia     Hypertension     MI (myocardial infarction) ([address] 2019    has had 9 MIs. 2019 was the last    Neuromuscular disorder ([address]     due to CVA    Numbness and tingling in left arm     from fistula    Pneumonia     PONV (postoperative nausea and vomiting)     Prolonged emergence from general anesthesia     States requires more medication than most people    Sleep apnea     Uses CPAP    Stroke ([facility]     7mm thalamic cva 2017 deficts left side, left side weakness    TIA (transient ischemic attack)     Unspecified diseases of blood and blood-forming organs      Past Surgical History:   Procedure Laterality Date    AORTIC VALVE REPLACEMENT N/A 10/15/2019    TRANSCATHETER AORTIC VALVE REPLACEMENT FEMORAL APPROACH performed by Kelsi Church MD at [address] [place] [address] Right 7/2/2019    PERITONEAL DIALYSIS CATHETER REMOVAL performed by Mitchell iHdalgo MD at [facility] [place] [facility] COLONOSCOPY  2/29/2015    [facility]    CORONARY ANGIOPLASTY WITH STENT PLACEMENT  05/26/15    CYST REMOVAL  08/14/2013    EXCISION CYSTS, NECK X2 AND ABDOMINAL benign    DIAGNOSTIC CARDIAC CATH LAB PROCEDURE      DIALYSIS FISTULA CREATION Left 10/30/2017    LEFT BRACHIAL CEPHALIC FISTULA    DIALYSIS FISTULA CREATION Left 3/27/2019    LIGATION  AV FISTULA performed by Loraine Jaramillo MD at [address] [place] [address], COLON, DIAGNOSTIC      OTHER SURGICAL HISTORY  02/01/2017    laparoscopic cholecystectomy with intraoperative cholangiogram    OTHER SURGICAL HISTORY  2018    PORT PLACEMENT  - vas cath    OTHER SURGICAL HISTORY Bilateral 2018    laprascopic peritoneal dialysis catheter placement    OTHER SURGICAL HISTORY Right 2018    peritoneal dialysis port placed on right side of abdomen    OTHER SURGICAL HISTORY  2019    PTA/Stenting R External Iliac artery    CT LAP INSERTION TUNNELED INTRAPERITONEAL CATHETER N/A 2018    LAPAROSCOPIC PERITONEAL DIALYSIS CATHETER REPLACEMENT performed by Mitchell Hidalgo MD at [address] ENDOSCOPY  2016    UPPER GASTROINTESTINAL ENDOSCOPY  2017    possible candida, otherwise normal appearing    VASCULAR SURGERY  aprx 2 years ago    2 stents placed, each side of groin     Family History   Problem Relation Age of Onset    Diabetes Mother     Heart Disease Father     Kidney Disease Sister         stage 4-kidney failure    Cancer Sister     Heart Disease Sister     Obesity Sister     Cancer Sister     Heart Disease Sister     Obesity Sister     Alcohol Abuse Brother      Social History     Socioeconomic History    Marital status:      Spouse name: Not on file    Number of children: Not on file    Years of education: Not on file    Highest education level: Not on file   Occupational History    Not on file   Tobacco Use    Smoking status: Current Some Day Smoker     Packs/day: 0.50     Years: 33.00     Pack years: 16.50     Types: Cigarettes     Last attempt to quit: 2020     Years since quittin.3    Smokeless tobacco: Never Used   Vaping Use    Vaping Use: Never used   Substance and Sexual Activity    Alcohol use: Not Currently     Alcohol/week: 0.0 standard drinks     Comment: occ    Drug use: No    Sexual activity: Yes     Partners: Female     Comment:    Other Topics Concern    Not on file   Social History Narrative    Not on file     Social Determinants of Health     Financial Resource Strain:     Difficulty of Paying Living Expenses:    Food Insecurity:     Worried About Running Out of Food in the Last Year:     [address] in the Last Year:    Transportation Needs:     Lack of Transportation (Medical):      Lack of Transportation (Non-Medical):    Physical Activity:     Days of Exercise per Week:     Minutes of Exercise per Session:    Stress:     Feeling of Stress :    Social Connections:     Frequency of Communication with Friends and Family:     Frequency of Social Gatherings with Friends and Family:     Attends Yazdanism Services:     Active Member of Clubs or Organizations:     Attends Club or Organization Meetings:     Marital Status:    Intimate Partner Violence:     Fear of Current or Ex-Partner:     Emotionally Abused:     Physically Abused:     Sexually Abused:      Current Facility-Administered Medications   Medication Dose Route Frequency Provider Last Rate Last Admin    oxyCODONE-acetaminophen (PERCOCET) 7.5-325 MG per tablet 1 tablet  1 tablet Oral Q4H PRN JAY Rose CNP   1 tablet at 09/08/21 0904    pravastatin (PRAVACHOL) tablet 40 mg  40 mg Oral Nightly JAY Ward CNP        gabapentin (NEURONTIN) capsule 100 mg  100 mg Oral TID JAY Ward CNP        albumin human 25 % IV solution 12.5 g  12.5 g IntraVENous PRN Bienvenido Cabrera MD 50 mL/hr at 09/08/21 1039 12.5 g at 09/08/21 1039    perflutren lipid microspheres (DEFINITY) injection 1.65 mg  1.5 mL IntraVENous ONCE PRN JAY Rose CNP        albuterol sulfate  (90 Base) MCG/ACT inhaler 2 puff  2 puff Inhalation Q6H PRN Jamia Mcdowell MD        aspirin chewable tablet 81 mg  81 mg Oral Daily Jamia Mcdowell MD   81 mg at 09/08/21 0812    insulin glargine (LANTUS) injection vial 45 Units  45 Units SubCUTAneous Nightly Jamia Mcdowell MD   45 Units at 09/07/21 4204    citalopram (CELEXA) tablet 40 mg  40 mg Oral Daily Jamia Mcdowell MD   40 mg at 09/08/21 6903    DULoxetine (CYMBALTA) extended release capsule 30 mg  30 mg Oral Daily Jana Barbosa MD   30 mg at 09/08/21 2465    [Held by provider] hydrALAZINE (APRESOLINE) tablet 25 mg  25 mg Oral 3 times per day Jana Barbosa MD        ipratropium-albuterol (DUONEB) nebulizer solution 3 mL  1 vial Inhalation Q6H PRN Jana Barbosa MD        isosorbide mononitrate (IMDUR) extended release tablet 30 mg  30 mg Oral Daily Jana Barbosa MD   30 mg at 09/08/21 2264    linaclotide (LINZESS) capsule 145 mcg  145 mcg Oral QAM AC Jana Barbosa MD        losartan (COZAAR) tablet 50 mg  50 mg Oral Daily Jana Barbosa MD   50 mg at 09/08/21 0812    pantoprazole (PROTONIX) tablet 40 mg  40 mg Oral QAM AC Jana Barbosa MD   40 mg at 09/08/21 0617    QUEtiapine (SEROQUEL) tablet 50 mg  50 mg Oral Nightly Jana Barbosa MD   50 mg at 09/07/21 2334    tiotropium-olodaterol (STIOLTO) 2.5-2.5 MCG/ACT inhaler 2 puff  2 puff Inhalation Daily Jana Barbosa MD   2 puff at 09/08/21 0843    torsemide (DEMADEX) tablet 100 mg  100 mg Oral Daily Jana Barbosa MD   100 mg at 09/08/21 5222    traZODone (DESYREL) tablet 150 mg  150 mg Oral Nightly Jana Barbosa MD   150 mg at 09/07/21 2334    sodium chloride flush 0.9 % injection 5-40 mL  5-40 mL IntraVENous 2 times per day Jana Barbosa MD   10 mL at 09/08/21 0814    sodium chloride flush 0.9 % injection 5-40 mL  5-40 mL IntraVENous PRN Jana Barbosa MD        0.9 % sodium chloride infusion  25 mL IntraVENous PRN Jana Barbosa MD        ondansetron (ZOFRAN-ODT) disintegrating tablet 4 mg  4 mg Oral Q8H PRN Jana Barbosa MD        Or    ondansetron (ZOFRAN) injection 4 mg  4 mg IntraVENous Q6H PRN Jana Barbosa MD        polyethylene glycol (GLYCOLAX) packet 17 g  17 g Oral Daily PRN Jana Barbosa MD        heparin (porcine) injection 5,000 Units  5,000 Units SubCUTAneous 3 times per day Jana Barbosa MD   5,000 Units at 09/08/21 0617    insulin lispro (HUMALOG) injection vial 0-6 Units  0-6 Units SubCUTAneous TID WC Laith Jc MD   1 Units at 09/08/21 0815    insulin lispro (HUMALOG) injection vial 0-3 Units  0-3 Units SubCUTAneous Nightly Laith Jc MD         Allergies   Allergen Reactions    Morphine Nausea And Vomiting       REVIEW OF SYSTEMS  10 systems reviewed, pertinent positives per HPI otherwise noted to be negative    PHYSICAL EXAM  BP (!) 142/69   Pulse 78   Temp 97.7 °F (36.5 °C) (Oral)   Resp 16   Wt 250 lb (113.4 kg)   SpO2 97%   BMI 39.16 kg/m²   GENERAL APPEARANCE: Awake and alert. Cooperative. No acute distress. HEAD: Normocephalic. Atraumatic. EYES: PERRL. EOM's grossly intact. ENT: Mucous membranes are moist.   NECK: Supple. No JVD. No tracheal tenderness or deviation. No crepitus. HEART: RRR. No murmurs. LUNGS: Respirations unlabored. CTAB. Good air exchange. Speaking comfortably in full sentences. ABDOMEN: Soft. Mildly-distended. Non-tender. No guarding or rebound. Negative Vigil's, McBurney's and Rovsing's. No fluids or ascites. No hernias or masses. Bowel sounds normal in all quadrants. No CVA tenderness. EXTREMITIES: No peripheral edema. No unilateral calf pain, redness or swelling. Moves all extremities equally. All extremities neurovascularly intact. SKIN: Warm and dry. No acute rashes. NEUROLOGICAL: Alert and oriented. CN's 2-12 intact. No gross facial drooping. Strength 5/5, sensation intact. Negative finger-to-nose. Negative heel-to-shin. No pronator drift. Negative test of skew. Negative Romberg's. Speech does appear mildly slurred. PSYCHIATRIC: Normal mood and affect. RADIOLOGY  XR CHEST PORTABLE    Result Date: 9/1/2021  EXAMINATION: ONE XRAY VIEW OF THE CHEST 9/1/2021 1:54 pm COMPARISON: 08/29/2021.  HISTORY: ORDERING SYSTEM PROVIDED HISTORY: F/up For Pneumonia TECHNOLOGIST PROVIDED HISTORY: Reason for exam:->F/up For Pneumonia Reason for Exam: F/up For Pneumonia Acuity: Unknown Type of Exam: Subsequent/Follow-up FINDINGS: The cardiomediastinal silhouette is stable. Previous TAVR. Bilateral perihilar opacification appears similar to the previous study, likely perihilar edema or possibly infiltrate. There is questionable increased opacity at the left base with poor definition of the left hemidiaphragm. Left-sided dialysis catheter remains in place. Stable perihilar edema or infiltrate. Questionable left basilar infiltrate. XR CHEST PORTABLE    Result Date: 8/29/2021  EXAMINATION: ONE XRAY VIEW OF THE CHEST 8/29/2021 6:41 pm COMPARISON: Chest x-ray 08/12/2021. HISTORY: ORDERING SYSTEM PROVIDED HISTORY: dyspnea, missed dialysis TECHNOLOGIST PROVIDED HISTORY: Reason for exam:->dyspnea, missed dialysis Reason for Exam: Shortness of Breath ( Dialysis pt M, W, F. Last treatment on Wednesday. Pt reports SOB that started today. Pt reports taking 3 treatments today prior to EMS arrival. EMS placed pt on CPAP and O2 was 80's. pt was given 0.5mg IM epi by squad. pt has increased WOB, retractions, speaks 1-2 words. Placed on BiPAP on arrival. ) FINDINGS: Left internal jugular line present with tip projecting over the right atrium. Cardiomediastinal silhouette is comparable to prior exam.  Perihilar and right basilar opacities noted. No pleural effusion appreciated on this projection. No pneumothorax appreciated on this projection. Perihilar and right basilar opacities. Correlate with presentation for pulmonary edema. Correlate clinically to exclude underlying pneumonia. XR CHEST PORTABLE    Result Date: 8/12/2021  EXAMINATION: ONE XRAY VIEW OF THE CHEST 8/12/2021 11:07 pm COMPARISON: Chest x-ray 07/27/2021.  HISTORY: ORDERING SYSTEM PROVIDED HISTORY: pulm edema TECHNOLOGIST PROVIDED HISTORY: Reason for exam:->pulm edema Reason for Exam: pulmonary edema FINDINGS: Cardiomediastinal silhouette is comparable to prior exam with upper normal sized heart. Questionable right upper and basilar opacities. No pleural effusion on this projection. Left internal jugular line with tip projecting over the right atrium. Prosthetic heart valve or valvuloplasty noted. Questionable right upper and basilar opacities. Asymmetric pulmonary edema and pneumonia are in the differential.      ED COURSE  Pain control was not required while here in the emergency department. Triage vitals stable. CBC with a leukocytosis of 13.7 with left shift and a 17% bandemia. CMP suggestive of chronic kidney disease with elevated BUN and creatinine. Normal potassium. Troponin 0 0. 15. Chronically elevated. Urinalysis showing 10-20 white blood cells with 1+ bacteria. Given dose of Rocephin. Urine and blood cultures pending. Lactic acid 0.8. Head CT without evidence to suggest acute intercranial abnormality. Stable portable chest x-ray. EKG normal sinus rhythm without evidence for acute ischemic change. Given symptomology of slurred speech and arm tingling recommending admission. Discussed case with hospital medicine and orders placed. A discussion was had with Mr. Anne Lyn regarding slurred speech, left arm tingling, ED findings and recommendations for admission. Risk management discussed and shared decision making had with patient and/or surrogate. All questions were answered. Patient in agreement.     MDM  Results for orders placed or performed during the hospital encounter of 09/07/21   Culture, Urine    Specimen: Urine, clean catch   Result Value Ref Range    Urine Culture, Routine No growth at 18 to 36 hours    CBC auto differential   Result Value Ref Range    WBC 13.3 (H) 4.0 - 11.0 K/uL    RBC 3.52 (L) 4.20 - 5.90 M/uL    Hemoglobin 10.6 (L) 13.5 - 17.5 g/dL    Hematocrit 32.0 (L) 40.5 - 52.5 %    MCV 90.9 80.0 - 100.0 fL    MCH 30.2 26.0 - 34.0 pg    MCHC 33.2 31.0 - 36.0 g/dL    RDW 16.6 (H) 12.4 - 15.4 %    Platelets 752 750 - 471 K/uL    MPV 8.2 5.0 - 10.5 fL    Neutrophils % 66.0 %    Lymphocytes % 9.0 %    Monocytes % 3.0 %    Eosinophils % 3.0 %    Basophils % 0.0 %    Neutrophils Absolute 11.3 (H) 1.7 - 7.7 K/uL    Lymphocytes Absolute 1.2 1.0 - 5.1 K/uL    Monocytes Absolute 0.4 0.0 - 1.3 K/uL    Eosinophils Absolute 0.4 0.0 - 0.6 K/uL    Basophils Absolute 0.0 0.0 - 0.2 K/uL    Bands Relative 17 (H) 0 - 7 %    Metamyelocytes Relative 1 (A) %    Myelocyte Percent 1 (A) %    Anisocytosis Occasional (A)     Macrocytes Occasional (A)     Microcytes Occasional (A)     Polychromasia Occasional (A)     Poikilocytes Occasional (A)     Ovalocytes Occasional (A)     Stomatocytes Occasional (A)    Comprehensive metabolic panel   Result Value Ref Range    Sodium 130 (L) 136 - 145 mmol/L    Potassium 4.9 3.5 - 5.1 mmol/L    Chloride 94 (L) 99 - 110 mmol/L    CO2 20 (L) 21 - 32 mmol/L    Anion Gap 16 3 - 16    Glucose 130 (H) 70 - 99 mg/dL    BUN 49 (H) 7 - 20 mg/dL    CREATININE 6.1 (HH) 0.9 - 1.3 mg/dL    GFR Non-African American 10 (A) >60    GFR  12 (A) >60    Calcium 9.1 8.3 - 10.6 mg/dL    Total Protein 7.0 6.4 - 8.2 g/dL    Albumin 3.8 3.4 - 5.0 g/dL    Albumin/Globulin Ratio 1.2 1.1 - 2.2    Total Bilirubin <0.2 0.0 - 1.0 mg/dL    Alkaline Phosphatase 76 40 - 129 U/L    ALT 17 10 - 40 U/L    AST 16 15 - 37 U/L    Globulin 3.2 g/dL   Troponin   Result Value Ref Range    Troponin 0.15 (H) <0.01 ng/mL   Urinalysis   Result Value Ref Range    Color, UA Yellow Straw/Yellow    Clarity, UA Clear Clear    Glucose, Ur 100 (A) Negative mg/dL    Bilirubin Urine Negative Negative    Ketones, Urine Negative Negative mg/dL    Specific Gravity, UA 1.015 1.005 - 1.030    Blood, Urine TRACE-LYSED (A) Negative    pH, UA 6.5 5.0 - 8.0    Protein, UA >=300 (A) Negative mg/dL    Urobilinogen, Urine 0.2 <2.0 E.U./dL    Nitrite, Urine Negative Negative    Leukocyte Esterase, Urine Negative Negative    Microscopic Examination YES     Urine Type NotGiven    Microscopic Urinalysis   Result Value Ref Range    Mucus, UA Rare (A) None Seen /LPF    WBC, UA 10-20 (A) 0 - 5 /HPF    RBC, UA 3-4 0 - 4 /HPF    Epithelial Cells, UA 0-1 0 - 5 /HPF    Bacteria, UA 1+ (A) None Seen /HPF   Lactic acid, plasma   Result Value Ref Range    Lactic Acid 0.8 0.4 - 2.0 mmol/L   CBC   Result Value Ref Range    WBC 9.2 4.0 - 11.0 K/uL    RBC 3.04 (L) 4.20 - 5.90 M/uL    Hemoglobin 9.2 (L) 13.5 - 17.5 g/dL    Hematocrit 27.4 (L) 40.5 - 52.5 %    MCV 90.0 80.0 - 100.0 fL    MCH 30.3 26.0 - 34.0 pg    MCHC 33.6 31.0 - 36.0 g/dL    RDW 16.1 (H) 12.4 - 15.4 %    Platelets 385 101 - 896 K/uL    MPV 8.2 5.0 - 10.5 fL   Hemoglobin A1c   Result Value Ref Range    Hemoglobin A1C 9.9 See comment %    eAG 237.4 mg/dL   Renal Function Panel   Result Value Ref Range    Sodium 132 (L) 136 - 145 mmol/L    Potassium 4.6 3.5 - 5.1 mmol/L    Chloride 95 (L) 99 - 110 mmol/L    CO2 20 (L) 21 - 32 mmol/L    Anion Gap 17 (H) 3 - 16    Glucose 154 (H) 70 - 99 mg/dL    BUN 57 (H) 7 - 20 mg/dL    CREATININE 7.5 (HH) 0.9 - 1.3 mg/dL    GFR Non-African American 8 (A) >60    GFR  9 (A) >60    Calcium 8.7 8.3 - 10.6 mg/dL    Phosphorus 7.8 (H) 2.5 - 4.9 mg/dL    Albumin 3.1 (L) 3.4 - 5.0 g/dL   POCT Glucose   Result Value Ref Range    POC Glucose 113 (H) 70 - 99 mg/dl    Performed on [employer]    POCT Glucose   Result Value Ref Range    POC Glucose 159 (H) 70 - 99 mg/dl    Performed on [employer]    EKG 12 Lead   Result Value Ref Range    Ventricular Rate 76 BPM    Atrial Rate 76 BPM    P-R Interval 174 ms    QRS Duration 96 ms    Q-T Interval 412 ms    QTc Calculation (Bazett) 463 ms    P Axis 49 degrees    R Axis 5 degrees    T Axis 83 degrees    Diagnosis       Normal sinus rhythmNonspecific T wave abnormalityProlonged QTAbnormal ECGConfirmed by Gunnar Del Real MD, Baljinder Salamanca ([id number]) on 9/7/2021 2:46:16 PM     I spoke with Lennie Turner and Chrissy Jaime .  We thoroughly discussed the history, physical exam, laboratory and imaging studies, as well as, emergency department course. Based upon that discussion, we've decided to admit Angelina Mejia to the hospital for further observation, evaluation and treatment. Final Impression  1. Speech problem    2. Left face and left arm tingling    3. Chronic kidney disease with end stage renal failure on dialysis ([facility]    4. Urinary tract infection with hematuria, site unspecified      Blood pressure (!) 142/69, pulse 78, temperature 97.7 °F (36.5 °C), temperature source Oral, resp. rate 16, weight 250 lb (113.4 kg), SpO2 97 %. DISPOSITION  Patient was admitted to the hospital in stable condition.           Ananya Montague, [address]  09/08/21 [id number]

## 2021-09-07 NOTE — TELEPHONE ENCOUNTER
Requesting refill  oxyCODONE-acetaminophen (PERCOCET) 7.5-325 MG per tablet 1 tablet       Last Office Visit  -  6/22/21  Next Office Visit  -  none    Last Filled  -    Last UDS -    Contract -

## 2021-09-07 NOTE — ED NOTES
JASE Boggs at bedside for evaluation. Per PA patient is not \"code stroke\" candidate.    Carroll Gandhi, NAVJOT  09/07/21 1102 [address], RN  09/07/21 1745

## 2021-09-07 NOTE — ED PROVIDER NOTES
I independently performed a history and physical on Kathy Olivia. All diagnostic, treatment, and disposition decisions were made by myself in conjunction with the advanced practice provider. I have participated in the medical decision making and directed the treatment plan and disposition of the patient. For further details of [address] emergency department encounter, please see the advanced practice provider's documentation. CHIEF COMPLAINT  Chief Complaint   Patient presents with    Numbness     left arm numbness, bilateral hand \"tingling\". Glucose 120 in route per EMS.  Aphasia     slurred speech started yesterday morning at 11am, patient states \"they tried to squad me out but I wouldnt let them\". Briefly, Kathy Olivia is a 46 y.o. male  who presents to the ED complaining of numbness of the bilateral hands and speech changes    FOCUSED PHYSICAL EXAMINATION  /86   Pulse 93   Temp 97.8 °F (36.6 °C) (Oral)   Resp 14   Wt 250 lb (113.4 kg)   SpO2 98%   BMI 39.16 kg/m²      Focused physical examination:  General appearance:  Cooperative. No acute distress. Skin:  Warm. Dry. Eye:  Extraocular movements intact. Pupils are equally round and reactive to light and accommodation. Extraocular motions are intact. CN II-XII intact. Ears, nose, mouth and throat:  Oral mucosa moist,  Neck:  Trachea midline. Heart:  Regular rate and rhythm  Perfusion:  intact  Respiratory:  Lungs clear to auscultation bilaterally. Respirations nonlabored. Abdominal:   Non distended. Nontender  Neurological:  Alert and oriented x 3. Moves all extremities spontaneously. Intact sensation throughout to exam though he describes paresthesias throughout the left arm and in the bilateral hands. Patient has very typical stuttering speech but no gross aphasia or dysarthria. Intact finger-to-nose bilaterally. No drift in the upper or lower extremities.   Gait normal.  2+ bilateral administration in time range)   tiotropium-olodaterol (STIOLTO) 2.5-2.5 MCG/ACT inhaler 2 puff (has no administration in time range)   torsemide (DEMADEX) tablet 100 mg (has no administration in time range)   traZODone (DESYREL) tablet 150 mg (has no administration in time range)   sodium chloride flush 0.9 % injection 5-40 mL (has no administration in time range)   sodium chloride flush 0.9 % injection 5-40 mL (has no administration in time range)   0.9 % sodium chloride infusion (has no administration in time range)   ondansetron (ZOFRAN-ODT) disintegrating tablet 4 mg (has no administration in time range)     Or   ondansetron (ZOFRAN) injection 4 mg (has no administration in time range)   polyethylene glycol (GLYCOLAX) packet 17 g (has no administration in time range)   heparin (porcine) injection 5,000 Units (has no administration in time range)   cefTRIAXone (ROCEPHIN) 1000 mg IVPB in 50 mL D5W minibag (has no administration in time range)   insulin lispro (HUMALOG) injection vial 0-6 Units (has no administration in time range)   insulin lispro (HUMALOG) injection vial 0-3 Units (has no administration in time range)   oxyCODONE-acetaminophen (PERCOCET) 7.5-325 MG per tablet 1 tablet (1 tablet Oral Given 9/7/21 1526)   cefTRIAXone (ROCEPHIN) 1000 mg IVPB in 50 mL D5W minibag (0 mg IntraVENous Stopped 9/7/21 1748)      This chart was created using Dragon dictation software. Efforts were made by me to ensure accuracy, however some errors may be present due to limitations of this technology.             Starlette Number, MD  09/07/21 [id number]

## 2021-09-07 NOTE — H&P
Hospital Medicine History & Physical      PCP: Pattie Dumont MD    Date of Admission: 9/7/2021    Date of Service: Pt seen/examined on 9/7/2021 and Admitted to Inpatient with expected LOS greater than two midnights due to medical therapy. Chief Complaint: Slurred speech and left arm tingling since yesterday      History Of Present Illness:     46 y.o. male who presented to [facility] with above complaint. He has a history of ESRD on hemodialysis Monday Wednesday Friday. While he was at dialysis yesterday the staff noticed that his speech was slurred and he complained left arm tingling as well. He was advised to go to emergency room but he declined  But his symptoms persisted and he was brought to emergency room for further evaluation. Still has slurred speech and difficulty in expression. He has good reception of speech. Still complaining some tingling over the left arm. He has diabetes and has poor sensation over both feet and fingers that is chronic. He denies chest pain shortness of breath or fever. He has some hesitancy in urination. He is a smoker and has chronic cough.     Past Medical History:          Diagnosis Date    Ambulatory dysfunction     walker for long distances, SOB with distance    Aortic stenosis     echo 2017    Arthritis     hands and hips    Asthma     Bilateral hilar adenopathy syndrome 6/3/2013    CAD (coronary artery disease)     Dr. Homar Nicolas [facility]) 04/19/2019    EF= 43%    CH[facility]congestive heart failure) ([facility])     Chronic pain     COPD (chronic obstructive pulmonary disease) ([address]     pulmonology Dr. Chyna Sevilla    Depression     Diabetes mellitus ([address]     borderline    Difficult intravenous access     Emphysema of lung ([address]     ESRD (end stage renal disease) on dialysis ([address]     MWF    Fear of needles     Gastric ulcer     GERD (gastroesophageal reflux disease)     Heart valve problem     bicuspic valve    Hemodialysis patient [facility])     History of spinal fracture     work incident    Hx of blood clots     Bilateral lower extremities; stents in place    Hyperlipidemia     Hypertension     MI (myocardial infarction) ([address] 2019    has had 9 MIs. 2019 was the last    Neuromuscular disorder ([address]     due to CVA    Numbness and tingling in left arm     from fistula    Pneumonia     PONV (postoperative nausea and vomiting)     Prolonged emergence from general anesthesia     States requires more medication than most people    Sleep apnea     Uses CPAP    Stroke ([address]     7mm thalamic cva 2017 deficts left side, left side weakness    TIA (transient ischemic attack)     Unspecified diseases of blood and blood-forming organs        Past Surgical History:          Procedure Laterality Date    AORTIC VALVE REPLACEMENT N/A 10/15/2019    TRANSCATHETER AORTIC VALVE REPLACEMENT FEMORAL APPROACH performed by Adelaide Padilla MD at [address] Right 7/2/2019    PERITONEAL DIALYSIS CATHETER REMOVAL performed by Dionna Grossman MD at [facility]  2/29/2015    WNL    CORONARY ANGIOPLASTY WITH STENT PLACEMENT  05/26/15    CYST REMOVAL  08/14/2013    EXCISION CYSTS, NECK X2 AND ABDOMINAL benign    DIAGNOSTIC CARDIAC CATH LAB PROCEDURE      DIALYSIS FISTULA CREATION Left 10/30/2017    LEFT BRACHIAL CEPHALIC FISTULA    DIALYSIS FISTULA CREATION Left 3/27/2019    LIGATION  AV FISTULA performed by Lizzette Abdi MD at [address], [address], DIAGNOSTIC      OTHER SURGICAL HISTORY  02/01/2017    laparoscopic cholecystectomy with intraoperative cholangiogram    OTHER SURGICAL HISTORY  2018    PORT PLACEMENT  - vas cath    OTHER SURGICAL HISTORY Bilateral 06/26/2018    laprascopic peritoneal dialysis catheter placement    OTHER SURGICAL HISTORY Right 09/2018    peritoneal dialysis port placed on right side of abdomen    OTHER SURGICAL HISTORY  05/28/2019 PTA/Stenting R External Iliac artery    HI LAP INSERTION TUNNELED INTRAPERITONEAL CATHETER N/A 9/21/2018    LAPAROSCOPIC PERITONEAL DIALYSIS CATHETER REPLACEMENT performed by Jordan Cadet MD at [address] ENDOSCOPY  01/06/2016    UPPER GASTROINTESTINAL ENDOSCOPY  01/29/2017    possible candida, otherwise normal appearing    VASCULAR SURGERY  aprx 2 years ago    2 stents placed, each side of groin       Medications Prior to Admission:      Prior to Admission medications    Medication Sig Start Date End Date Taking? Authorizing Provider   torsemide (DEMADEX) 100 MG tablet Take 1 tablet by mouth daily 9/4/21   Michael Torres MD   isosorbide mononitrate (IMDUR) 30 MG extended release tablet TAKE 1 TABLET BY MOUTH EVERY DAY 9/3/21   Charly Figueroa APRN - CNP   pantoprazole (PROTONIX) 40 MG tablet TAKE (1) TABLET BY MOUTH EACH MORNING BEFORE BREAKFAST 9/3/21   Stacey Lozano APRN - CNP   Calcium Acetate, Phos Binder, 667 MG CAPS TAKE 1 CAPSULE BY MOUTH THREE TIMES DAILY WITH MEALS 8/12/21   Lea Mahan MD   pravastatin (PRAVACHOL) 40 MG tablet TAKE (1) TABLET BY MOUTH IN THE EVENING 8/5/21   Lea Mahan MD   QUEtiapine (SEROQUEL) 50 MG tablet TAKE (1) TABLET BY MOUTH IN THE EVENING 8/5/21   Lea Mahan MD   BASAGLAR KWIKPEN 100 UNIT/ML injection pen ADMINISTER 60 UNITS UNDER THE SKIN EVERY NIGHT 7/29/21   Lea Mahan MD   hydrocortisone (ANUSOL-HC) 2.5 % CREA rectal cream Place rectally 2 times daily 6/9/21   Lea Mahan MD   albuterol (PROVENTIL) (2.5 MG/3ML) 0.083% nebulizer solution INHALE 1 VIAL VIA NEBULIZER EVERY 6 HOURS AS NEEDED FOR WHEEZING 6/2/21   Lea Mahan MD   nystatin (MYCOSTATIN) 720292 UNIT/GM cream Apply topically 2 times daily.  5/28/21   Lea Mahan MD   famotidine (PEPCID) 20 MG tablet TAKE 1 TABLET BY MOUTH TWICE DAILY AS NEEDED FOR HEARTBURN 5/28/21   Lea Mahan MD   hydrOXYzine (VISTARIL) 50 MG capsule TAKE 1 TO 2 CAPSULES BY MOUTH NIGHTLY 5/26/21   Rafy Dover MD   gabapentin (NEURONTIN) 100 MG capsule TAKE 1 TO 2 CAPSULES BY MOUTH THREE TIMES A DAY 5/25/21 6/25/21  Rafy Dover MD   LINZESS 145 MCG capsule TAKE 1 CAPSULE BY MOUTH EVERY MORNING BEFORE BREAKFAST 5/25/21   Rafy Dover MD   hydrALAZINE (APRESOLINE) 50 MG tablet TAKE 1/2 TABLET BY MOUTH EVERY 8 HOURS 5/24/21   Rafy Dover MD   traZODone (DESYREL) 150 MG tablet TAKE (1) TABLET BY MOUTH NIGHTLY 5/7/21   JAY Kay CNP   dilTIAZem (CARDIZEM CD) 180 MG extended release capsule  4/15/21   Historical MD Caroline   cyclobenzaprine (FLEXERIL) 10 MG tablet TAKE 1 TABLET BY MOUTH EVERY 8 HOURS AS NEEDED 5/3/21   Rafy Dover MD   DULoxetine (CYMBALTA) 30 MG extended release capsule TAKE 1 CAPSULE BY MOUTH EVERY DAY 4/27/21   Rafy Dover MD   tiZANidine (ZANAFLEX) 4 MG tablet TAKE 1 TABLET BY MOUTH THREE TIMES DAILY 4/27/21   Rafy Dover MD   losartan (COZAAR) 50 MG tablet TAKE 1 TABLET BY MOUTH DAILY 3/25/21   Rafy Dover MD   simethicone (MYLICON) 80 MG chewable tablet Take 1 tablet by mouth every 6 hours as needed (cramping) 2/3/21   Leticia Yun MD   glucose monitoring kit (FREESTYLE) monitoring kit 1 kit by Does not apply route daily 1/8/21   Rafy Dover MD   blood glucose test strips (GLUCOSE METER TEST) strip 1 each by In Vitro route 5 times daily As needed. 1/8/21   Rafy Dover MD   nitroGLYCERIN (NITROSTAT) 0.4 MG SL tablet DISSOLVE 1 TABLET UNDER THE TONGUE AS NEEDED FOR CHEST PAIN EVERY 5 MINUTES UP TO 3 TIMES.  IF NO RELIEF CALL 911. 1/7/21   Rafy Dover MD   B Complex-C-Folic Acid (VIRT-CAPS) 1 MG CAPS TK ONE C PO  QD 11/18/20   Rafy Dover MD   citalopram (CELEXA) 40 MG tablet TAKE (1) TABLET BY MOUTH DAILY 11/4/20   Rafy Dover MD   insulin aspart (NOVOLOG FLEXPEN) 100 UNIT/ML injection pen Inject 20 Units into the skin 3 times daily (before meals) 10/12/20   Rafy Dovre MD ondansetron (ZOFRAN ODT) 4 MG disintegrating tablet Take 1 tablet by mouth every 8 hours as needed for Nausea 8/5/20   Tomeka Solis MD   blood glucose test strips (FREESTYLE LITE) strip Daily As needed. 8/19/19   Tomeka Solis MD   glucose monitoring kit (FREESTYLE) monitoring kit 1 kit by Does not apply route daily 8/19/19   Tomeka Solis MD   vitamin D (ERGOCALCIFEROL) 93610 units CAPS capsule TK 1 C PO WEEKLY 6/2/19   Historical Provider, MD   Tiotropium Bromide-Olodaterol (STIOLTO RESPIMAT) 2.5-2.5 MCG/ACT AERS Inhale 2 puffs into the lungs daily 5/21/19   Renae López MD   Polyethylene Glycol 3350 GRAN  5/2/18   Historical Provider, MD   Glucose Blood (BLOOD GLUCOSE TEST STRIPS) STRP TEST 3-4 TIMES DAILY, AS DIRECTED 4/25/18   Natali Harmon MD   Blood Glucose Monitoring Suppl ADAM USE AS DIRECTED. 4/25/18   Natali Harmon MD   Alcohol Swabs PADS USE AS DIRECTED 4/25/18   Natali Harmon MD   albuterol sulfate  (90 Base) MCG/ACT inhaler Inhale 2 puffs into the lungs every 6 hours as needed for Wheezing 11/8/17   Nick Klein MD   ipratropium-albuterol (DUONEB) 0.5-2.5 (3) MG/3ML SOLN nebulizer solution Inhale 3 mLs into the lungs every 6 hours as needed for Shortness of Breath 10/15/17   Joya Ovalles MD   calcium carbonate (TUMS) 500 MG chewable tablet Take 1 tablet by mouth 3 times daily as needed for Heartburn. Historical Provider, MD   aspirin 81 MG chewable tablet Take 1 tablet by mouth daily. Patient taking differently: Take 81 mg by mouth daily Indications: stopped on 6/25 for surgery  5/14/13   Rakesh Edwards MD       Allergies:  Morphine    Social History:      The patient currently lives at home    TOBACCO:   reports that he has been smoking cigarettes. He has a 16.50 pack-year smoking history. He has never used smokeless tobacco.  ETOH:   reports previous alcohol use.   E-Cigarettes/Vaping Use     Questions Responses    E-Cigarette/Vaping Use Never User    Start Date     Passive Exposure     Quit Date     Counseling Given     Comments             Family History:       Reviewed in detail and negative for DM, CAD, Cancer, CVA. Positive as follows:        Problem Relation Age of Onset    Diabetes Mother     Heart Disease Father     Kidney Disease Sister         stage 4-kidney failure    Cancer Sister     Heart Disease Sister     Obesity Sister     Cancer Sister     Heart Disease Sister     Obesity Sister     Alcohol Abuse Brother        REVIEW OF SYSTEMS COMPLETED:   Pertinent positives as noted in the HPI. All other systems reviewed and negative. PHYSICAL EXAM PERFORMED:    BP (!) 157/130   Pulse 74   Temp 97.8 °F (36.6 °C) (Oral)   Resp 13   Wt 250 lb (113.4 kg)   SpO2 100%   BMI 39.16 kg/m²     General appearance:  No apparent distress, appears stated age and cooperative. HEENT:  Normal cephalic, atraumatic without obvious deformity. Pupils equal, round, and reactive to light. Extra ocular muscles intact. Conjunctivae/corneas clear. Neck: Supple, with full range of motion. No jugular venous distention. Trachea midline. Respiratory:  Normal respiratory effort. Clear to auscultation, bilaterally without Rales/Wheezes/Rhonchi. Cardiovascular:  Regular rate and rhythm with normal S1/S2 with murmurs, no rubs or gallops. Abdomen: Soft, non-tender, non-distended with normal bowel sounds. Musculoskeletal:  No clubbing, cyanosis or edema bilaterally. Full range of motion without deformity. Skin: Skin color, texture, turgor normal.   wounds over the right leg.   Neurologic: No focal weakness over extremities diminished sensation over toes and fingers, tingling sensation over the left arm. ,  Slurred speech /poor expression  psychiatric:  Alert and oriented, thought content appropriate,   Capillary Refill: Brisk,3 seconds, normal  Peripheral Pulses: +2 palpable, equal bilaterally       Labs:     Recent Labs     09/07/21  1243   WBC 13.3*   HGB 10.6*   HCT 32.0*        Recent Labs     09/07/21  1243   *   K 4.9   CL 94*   CO2 20*   BUN 49*   CREATININE 6.1*   CALCIUM 9.1     Recent Labs     09/07/21  1243   AST 16   ALT 17   BILITOT <0.2   ALKPHOS 76     No results for input(s): INR in the last 72 hours. Recent Labs     09/07/21  1243   TROPONINI 0.15*       Urinalysis:      Lab Results   Component Value Date    NITRU Negative 09/07/2021    WBCUA 10-20 09/07/2021    BACTERIA 1+ 09/07/2021    RBCUA 3-4 09/07/2021    BLOODU TRACE-LYSED 09/07/2021    SPECGRAV 1.015 09/07/2021    GLUCOSEU 100 09/07/2021       Radiology:     CXR: I have reviewed the CXR with the following interpretation:  EKG:  I have reviewed the EKG with the following interpretation:   Normal sinus rhythmNonspecific T wave abnormalityProlonged QTAbnormal ECGConfirmed by Gunnar Del Real MD, IVORY     CT HEAD WO CONTRAST   Final Result   No acute intracranial abnormality. XR CHEST PORTABLE   Final Result   No radiographic evidence of acute pulmonary disease. ASSESSMENT:    Slurred speech and left arm tingling-rule out CVA-admit, neurochecks, aspirin, statin, MRI of the brain, echocardiogram and carotid Doppler, neurology eval    ESRD on hemodialysis-gets dialysis Monday Wednesday Friday, no evidence of fluid overload hyperkalemia or change in mental status-nephrology evaluation    Chronically elevated troponin-    Urinary complaints and mildly abnormal UA-started on Rocephin follow-up urine culture    ZAINAB-verify the use of CPAP overnight    History of aortic stenosis    Chronic systolic CHF-ejection fraction 15%-no evidence of fluid overload now, on Demadex potassium not on beta-blocker on Cardizem? ?   Hold Cardizem and verify home medications    COPD-stable    Chronic leg wounds-wound care    Recently admitted with sepsis possibly secondary to pneumonia-completed antibiotics and WBC is trending down    CAD stent-aspirin statin not on beta-blocker    Smoker-

## 2021-09-07 NOTE — TELEPHONE ENCOUNTER
Received call from Morena Dhaliwal at [facility] with [employer] Complaint. Brief description of triage: speech difficulties. Triage indicates for patient to call 911. Pt informed this RN would call him back to check on him. Care advice provided, patient verbalizes understanding; denies any other questions or concerns; instructed to call back for any new or worsening symptoms. Attention Provider: Thank you for allowing me to participate in the care of your patient. The patient was connected to triage in response to information provided to the [facility]. Please do not respond through this encounter as the response is not directed to a shared pool. [id number]: called pt back to see if he called 911. He stated he just got dressed and was about to call. Reason for Disposition   Difficult to awaken or acting confused (e.g., disoriented, slurred speech)    Answer Assessment - Initial Assessment Questions  1. SYMPTOM: \"What is the main symptom you are concerned about? \" (e.g., weakness, numbness)      Difficulty getting words out. Stuttering and speech difficulties noted. Struggling to get words out as talking to triager. 2. ONSET: \"When did this start? \" (minutes, hours, days; while sleeping)      Yesterday    3. LAST NORMAL: \"When was the last time you were normal (no symptoms)? \"      Yesterday    4. PATTERN \"Does this come and go, or has it been constant since it started? \"  \"Is it present now? \"      Constant. 5. CARDIAC SYMPTOMS: \"Have you had any of the following symptoms: chest pain, difficulty breathing, palpitations? \"      *No Answer*  6. NEUROLOGIC SYMPTOMS: \"Have you had any of the following symptoms: headache, dizziness, vision loss, double vision, changes in speech, unsteady on your feet? \"      Arms and tingling in arms yesterday bilaterally. Tingling left arm. 7. OTHER SYMPTOMS: \"Do you have any other symptoms? \"      *No Answer*  8. PREGNANCY: \"Is there any chance you are pregnant? \" \"When was your last menstrual period? \"      N/a    Further questioning deferred due to critical complaint of speech difficulties and concern for CVA.     Protocols used: NEUROLOGIC DEFICIT-ADULT-[place]

## 2021-09-08 ENCOUNTER — APPOINTMENT (OUTPATIENT)
Dept: VASCULAR LAB | Age: 51
DRG: 064 | End: 2021-09-08
Payer: COMMERCIAL

## 2021-09-08 LAB
ALBUMIN SERPL-MCNC: 3.1 G/DL (ref 3.4–5)
ANION GAP SERPL CALCULATED.3IONS-SCNC: 17 MMOL/L (ref 3–16)
BUN BLDV-MCNC: 57 MG/DL (ref 7–20)
CALCIUM SERPL-MCNC: 8.7 MG/DL (ref 8.3–10.6)
CHLORIDE BLD-SCNC: 95 MMOL/L (ref 99–110)
CHOLESTEROL, TOTAL: 167 MG/DL (ref 0–199)
CO2: 20 MMOL/L (ref 21–32)
CREAT SERPL-MCNC: 7.5 MG/DL (ref 0.9–1.3)
ESTIMATED AVERAGE GLUCOSE: 237.4 MG/DL
GFR AFRICAN AMERICAN: 9
GFR NON-AFRICAN AMERICAN: 8
GLUCOSE BLD-MCNC: 137 MG/DL (ref 70–99)
GLUCOSE BLD-MCNC: 154 MG/DL (ref 70–99)
GLUCOSE BLD-MCNC: 159 MG/DL (ref 70–99)
GLUCOSE BLD-MCNC: 237 MG/DL (ref 70–99)
GLUCOSE BLD-MCNC: 242 MG/DL (ref 70–99)
HBA1C MFR BLD: 9.9 %
HCT VFR BLD CALC: 27.4 % (ref 40.5–52.5)
HDLC SERPL-MCNC: 45 MG/DL (ref 40–60)
HEMOGLOBIN: 9.2 G/DL (ref 13.5–17.5)
LDL CHOLESTEROL CALCULATED: 77 MG/DL
MCH RBC QN AUTO: 30.3 PG (ref 26–34)
MCHC RBC AUTO-ENTMCNC: 33.6 G/DL (ref 31–36)
MCV RBC AUTO: 90 FL (ref 80–100)
PDW BLD-RTO: 16.1 % (ref 12.4–15.4)
PERFORMED ON: ABNORMAL
PHOSPHORUS: 7.8 MG/DL (ref 2.5–4.9)
PLATELET # BLD: 299 K/UL (ref 135–450)
PMV BLD AUTO: 8.2 FL (ref 5–10.5)
POTASSIUM SERPL-SCNC: 4.6 MMOL/L (ref 3.5–5.1)
RBC # BLD: 3.04 M/UL (ref 4.2–5.9)
SODIUM BLD-SCNC: 132 MMOL/L (ref 136–145)
TRIGL SERPL-MCNC: 227 MG/DL (ref 0–150)
URINE CULTURE, ROUTINE: NORMAL
VLDLC SERPL CALC-MCNC: 45 MG/DL
WBC # BLD: 9.2 K/UL (ref 4–11)

## 2021-09-08 PROCEDURE — 6360000002 HC RX W HCPCS

## 2021-09-08 PROCEDURE — 83036 HEMOGLOBIN GLYCOSYLATED A1C: CPT

## 2021-09-08 PROCEDURE — 6370000000 HC RX 637 (ALT 250 FOR IP): Performed by: INTERNAL MEDICINE

## 2021-09-08 PROCEDURE — 1200000000 HC SEMI PRIVATE

## 2021-09-08 PROCEDURE — 85027 COMPLETE CBC AUTOMATED: CPT

## 2021-09-08 PROCEDURE — 6360000002 HC RX W HCPCS: Performed by: INTERNAL MEDICINE

## 2021-09-08 PROCEDURE — 5A1D70Z PERFORMANCE OF URINARY FILTRATION, INTERMITTENT, LESS THAN 6 HOURS PER DAY: ICD-10-PCS | Performed by: INTERNAL MEDICINE

## 2021-09-08 PROCEDURE — 2580000003 HC RX 258: Performed by: INTERNAL MEDICINE

## 2021-09-08 PROCEDURE — P9047 ALBUMIN (HUMAN), 25%, 50ML: HCPCS

## 2021-09-08 PROCEDURE — 80069 RENAL FUNCTION PANEL: CPT

## 2021-09-08 PROCEDURE — 6370000000 HC RX 637 (ALT 250 FOR IP): Performed by: NURSE PRACTITIONER

## 2021-09-08 PROCEDURE — 6370000000 HC RX 637 (ALT 250 FOR IP): Performed by: REGISTERED NURSE

## 2021-09-08 PROCEDURE — 36415 COLL VENOUS BLD VENIPUNCTURE: CPT

## 2021-09-08 PROCEDURE — 94660 CPAP INITIATION&MGMT: CPT

## 2021-09-08 PROCEDURE — 93880 EXTRACRANIAL BILAT STUDY: CPT

## 2021-09-08 PROCEDURE — 90935 HEMODIALYSIS ONE EVALUATION: CPT

## 2021-09-08 PROCEDURE — 94640 AIRWAY INHALATION TREATMENT: CPT

## 2021-09-08 PROCEDURE — 99223 1ST HOSP IP/OBS HIGH 75: CPT | Performed by: NURSE PRACTITIONER

## 2021-09-08 PROCEDURE — 80061 LIPID PANEL: CPT

## 2021-09-08 PROCEDURE — 92610 EVALUATE SWALLOWING FUNCTION: CPT

## 2021-09-08 RX ORDER — HEPARIN SODIUM 1000 [USP'U]/ML
4000 INJECTION, SOLUTION INTRAVENOUS; SUBCUTANEOUS PRN
Status: DISCONTINUED | OUTPATIENT
Start: 2021-09-08 | End: 2021-09-12 | Stop reason: HOSPADM

## 2021-09-08 RX ORDER — DOXERCALCIFEROL 2 UG/ML
3 INJECTION, SOLUTION INTRAVENOUS
Status: DISCONTINUED | OUTPATIENT
Start: 2021-09-10 | End: 2021-09-12 | Stop reason: HOSPADM

## 2021-09-08 RX ORDER — ALBUMIN (HUMAN) 12.5 G/50ML
12.5 SOLUTION INTRAVENOUS PRN
Status: DISCONTINUED | OUTPATIENT
Start: 2021-09-08 | End: 2021-09-12 | Stop reason: HOSPADM

## 2021-09-08 RX ORDER — OXYCODONE AND ACETAMINOPHEN 7.5; 325 MG/1; MG/1
1 TABLET ORAL EVERY 4 HOURS PRN
Status: DISCONTINUED | OUTPATIENT
Start: 2021-09-08 | End: 2021-09-12 | Stop reason: HOSPADM

## 2021-09-08 RX ORDER — ALBUMIN (HUMAN) 12.5 G/50ML
SOLUTION INTRAVENOUS
Status: COMPLETED
Start: 2021-09-08 | End: 2021-09-08

## 2021-09-08 RX ORDER — PRAVASTATIN SODIUM 40 MG
40 TABLET ORAL NIGHTLY
Status: DISCONTINUED | OUTPATIENT
Start: 2021-09-08 | End: 2021-09-12 | Stop reason: HOSPADM

## 2021-09-08 RX ORDER — GABAPENTIN 100 MG/1
100 CAPSULE ORAL 3 TIMES DAILY
Status: DISCONTINUED | OUTPATIENT
Start: 2021-09-08 | End: 2021-09-12 | Stop reason: HOSPADM

## 2021-09-08 RX ADMIN — INSULIN LISPRO 2 UNITS: 100 INJECTION, SOLUTION INTRAVENOUS; SUBCUTANEOUS at 17:32

## 2021-09-08 RX ADMIN — TRAZODONE HYDROCHLORIDE 150 MG: 50 TABLET ORAL at 20:36

## 2021-09-08 RX ADMIN — ASPIRIN 81 MG: 81 TABLET, CHEWABLE ORAL at 08:12

## 2021-09-08 RX ADMIN — CITALOPRAM HYDROBROMIDE 40 MG: 20 TABLET ORAL at 08:12

## 2021-09-08 RX ADMIN — OXYCODONE AND ACETAMINOPHEN 1 TABLET: 7.5; 325 TABLET ORAL at 20:36

## 2021-09-08 RX ADMIN — HEPARIN SODIUM 5000 UNITS: 5000 INJECTION INTRAVENOUS; SUBCUTANEOUS at 14:50

## 2021-09-08 RX ADMIN — SODIUM CHLORIDE, PRESERVATIVE FREE 10 ML: 5 INJECTION INTRAVENOUS at 08:14

## 2021-09-08 RX ADMIN — ISOSORBIDE MONONITRATE 30 MG: 30 TABLET, EXTENDED RELEASE ORAL at 08:12

## 2021-09-08 RX ADMIN — QUETIAPINE FUMARATE 50 MG: 25 TABLET ORAL at 20:36

## 2021-09-08 RX ADMIN — TIOTROPIUM BROMIDE AND OLODATEROL 2 PUFF: 3.124; 2.736 SPRAY, METERED RESPIRATORY (INHALATION) at 08:43

## 2021-09-08 RX ADMIN — TORSEMIDE 100 MG: 100 TABLET ORAL at 08:12

## 2021-09-08 RX ADMIN — LOSARTAN POTASSIUM 50 MG: 25 TABLET, FILM COATED ORAL at 08:12

## 2021-09-08 RX ADMIN — ALBUMIN (HUMAN) 12.5 G: 12.5 SOLUTION INTRAVENOUS at 10:39

## 2021-09-08 RX ADMIN — PANTOPRAZOLE SODIUM 40 MG: 40 TABLET, DELAYED RELEASE ORAL at 06:17

## 2021-09-08 RX ADMIN — INSULIN LISPRO 1 UNITS: 100 INJECTION, SOLUTION INTRAVENOUS; SUBCUTANEOUS at 08:15

## 2021-09-08 RX ADMIN — ALBUMIN (HUMAN) 12.5 G: 0.25 INJECTION, SOLUTION INTRAVENOUS at 10:39

## 2021-09-08 RX ADMIN — GABAPENTIN 100 MG: 100 CAPSULE ORAL at 14:48

## 2021-09-08 RX ADMIN — PRAVASTATIN SODIUM 40 MG: 40 TABLET ORAL at 20:37

## 2021-09-08 RX ADMIN — GABAPENTIN 100 MG: 100 CAPSULE ORAL at 20:36

## 2021-09-08 RX ADMIN — INSULIN GLARGINE 45 UNITS: 100 INJECTION, SOLUTION SUBCUTANEOUS at 20:37

## 2021-09-08 RX ADMIN — OXYCODONE AND ACETAMINOPHEN 1 TABLET: 7.5; 325 TABLET ORAL at 14:49

## 2021-09-08 RX ADMIN — HEPARIN SODIUM 5000 UNITS: 5000 INJECTION INTRAVENOUS; SUBCUTANEOUS at 00:02

## 2021-09-08 RX ADMIN — INSULIN LISPRO 1 UNITS: 100 INJECTION, SOLUTION INTRAVENOUS; SUBCUTANEOUS at 20:37

## 2021-09-08 RX ADMIN — SODIUM CHLORIDE, PRESERVATIVE FREE 10 ML: 5 INJECTION INTRAVENOUS at 20:39

## 2021-09-08 RX ADMIN — HEPARIN SODIUM 5000 UNITS: 5000 INJECTION INTRAVENOUS; SUBCUTANEOUS at 06:17

## 2021-09-08 RX ADMIN — HEPARIN SODIUM 5000 UNITS: 5000 INJECTION INTRAVENOUS; SUBCUTANEOUS at 22:13

## 2021-09-08 RX ADMIN — DULOXETINE HYDROCHLORIDE 30 MG: 30 CAPSULE, DELAYED RELEASE ORAL at 08:12

## 2021-09-08 RX ADMIN — OXYCODONE AND ACETAMINOPHEN 1 TABLET: 7.5; 325 TABLET ORAL at 09:04

## 2021-09-08 ASSESSMENT — PAIN SCALES - GENERAL
PAINLEVEL_OUTOF10: 9
PAINLEVEL_OUTOF10: 10
PAINLEVEL_OUTOF10: 10

## 2021-09-08 ASSESSMENT — PAIN DESCRIPTION - LOCATION
LOCATION: BACK
LOCATION: BACK

## 2021-09-08 ASSESSMENT — PAIN DESCRIPTION - PAIN TYPE
TYPE: CHRONIC PAIN
TYPE: CHRONIC PAIN

## 2021-09-08 ASSESSMENT — PAIN DESCRIPTION - ORIENTATION: ORIENTATION: RIGHT;LEFT

## 2021-09-08 ASSESSMENT — PAIN DESCRIPTION - DESCRIPTORS: DESCRIPTORS: SHARP

## 2021-09-08 NOTE — PROGRESS NOTES
Hospitalist Progress Note      PCP: Zara Dove MD    Date of Admission: 9/7/2021    Chief Complaint: [facility] Course: H&P reviewed     Subjective: Attempted to bedside round on pt, he is currently in HD. Reviewed EMR, discussed with pt's nurse.      Medications:  Reviewed    Infusion Medications    sodium chloride       Scheduled Medications    pravastatin  40 mg Oral Nightly    gabapentin  100 mg Oral TID    [START ON 9/10/2021] epoetin siddharth-epbx  1,600 Units IntraVENous Once per day on Mon Wed Fri    [START ON 9/10/2021] doxercalciferol  3 mcg IntraVENous Q MWF    aspirin  81 mg Oral Daily    insulin glargine  45 Units SubCUTAneous Nightly    citalopram  40 mg Oral Daily    DULoxetine  30 mg Oral Daily    [Held by provider] hydrALAZINE  25 mg Oral 3 times per day    isosorbide mononitrate  30 mg Oral Daily    linaclotide  145 mcg Oral QAM AC    losartan  50 mg Oral Daily    pantoprazole  40 mg Oral QAM AC    QUEtiapine  50 mg Oral Nightly    tiotropium-olodaterol  2 puff Inhalation Daily    torsemide  100 mg Oral Daily    traZODone  150 mg Oral Nightly    sodium chloride flush  5-40 mL IntraVENous 2 times per day    heparin (porcine)  5,000 Units SubCUTAneous 3 times per day    insulin lispro  0-6 Units SubCUTAneous TID WC    insulin lispro  0-3 Units SubCUTAneous Nightly     PRN Meds: oxyCODONE-acetaminophen, albumin human, perflutren lipid microspheres, albuterol sulfate HFA, ipratropium-albuterol, sodium chloride flush, sodium chloride, ondansetron **OR** ondansetron, polyethylene glycol      Intake/Output Summary (Last 24 hours) at 9/8/2021 1556  Last data filed at 9/8/2021 1516  Gross per 24 hour   Intake 609.44 ml   Output --   Net 609.44 ml       Physical Exam Performed:    /64   Pulse 79   Temp 97.7 °F (36.5 °C) (Oral)   Resp 16   Wt 250 lb (113.4 kg)   SpO2 98%   BMI 39.16 kg/m²     Labs:   Recent Labs     09/07/21  1243 09/08/21  0540   WBC 13.3* 9.2   HGB 10.6* 9.2*   HCT 32.0* 27.4*    299     Recent Labs     09/07/21  1243 09/08/21  0540   * 132*   K 4.9 4.6   CL 94* 95*   CO2 20* 20*   BUN 49* 57*   CREATININE 6.1* 7.5*   CALCIUM 9.1 8.7   PHOS  --  7.8*     Recent Labs     09/07/21  1243   AST 16   ALT 17   BILITOT <0.2   ALKPHOS 76     Recent Labs     09/07/21  1243   TROPONINI 0.15*       Urinalysis:      Lab Results   Component Value Date    NITRU Negative 09/07/2021    WBCUA 10-20 09/07/2021    BACTERIA 1+ 09/07/2021    RBCUA 3-4 09/07/2021    BLOODU TRACE-LYSED 09/07/2021    SPECGRAV 1.015 09/07/2021    GLUCOSEU 100 09/07/2021       Radiology:  VL DUP CAROTID BILATERAL         CT HEAD WO CONTRAST   Final Result   No acute intracranial abnormality. XR CHEST PORTABLE   Final Result   No radiographic evidence of acute pulmonary disease. MRI BRAIN WO CONTRAST    (Results Pending)       Assessment/Plan:    Active Hospital Problems    Diagnosis     Acute cerebrovascular accident (CVA) ([facility] [I63.9]        Dysarthria with associated left arm paresthesia:  - Concern for TIA/CVA. CT head showed no acute abnormality. CTA head and neck showed less than 50% stenosis of bilateral ICAs, final read is pending.    - Plan for MRI brain.   - ECHO from Jan '21 reviewed. No need for updated ECHO unless MRI reveals CVA per Neuro. - Monitor pt on telemetry.   - PT/OT/SLP evaluations.   - Neurology consulted/following -- appreciate. - Continue aspirin and statin. - Needs secondary stroke prevention (DM2, HTN, HLD, tobacco abuse, ZAINAB, obesity). DM2:  - Poorly controlled, A1C 9.9.  - Continue basal bolus insulin regimen -- monitor and adjust as needed.    - Carb-control diet.      ESRD on HD M/W/F:  - Nephrology consulted for HD management -- appreciate.      Chronic troponin elevation:  - EKG non-acute, no complaints of angina.   - Stable and consistent with his baseline.   - Likely secondary to reduced renal clearance in setting of ESRD. HTN:  - Poorly controlled on admission, improved now. - Continue home losartan. Resume hydralazine/cardizem once CVA ruled out to allow for permissive HTN. CAD, AS s/p TAVR:  - Continue aspirin, statin and imdur    HLD:  - Continue statin.      Urinary complaints and mildly abnormal UA:  - Urine culture showed no growth. Dc IV rocephin.      ZAINAB:  - CPAP QHS and during the day with naps.      COPD:  - Stable, continue home inhalers. Anemia:  - Secondary to ESRD. On epoetin. Stable H&H. No overt signs of bleeding. Monitor.      Chronic leg wounds:  - Wound care consulted.      Tobacco abuse:  - Counseled on cessation. Obesity:  - With Body mass index is 39.16 kg/m². Complicating assessment and treatment. Placing patient at risk for multiple co-morbidities as well as early death and contributing to the patient's presentation. Counseled on weight loss. DVT Prophylaxis: SQ heparin   Diet: ADULT DIET;  Regular; 4 carb choices (60 gm/meal)  Code Status: Full Code    PT/OT Eval Status: ordered     Dispo - likely home tomorrow 9/9    [address] [place] [address], APRN - CNP

## 2021-09-08 NOTE — PROGRESS NOTES
4 Eyes Skin Assessment     The patient is being assess for   Admission    I agree that 2 RN's have performed a thorough Head to Toe Skin Assessment on the patient. ALL assessment sites listed below have been assessed. Areas assessed for pressure by both nurses:   [x]   Head, Face, and Ears   [x]   Shoulders, Back, and Chest, Abdomen  [x]   Arms, Elbows, and Hands   [x]   Coccyx, Sacrum, and Ischium  [x]   Legs, Feet, and Heels                      All Mepilex Borders were peeled back and area peeked at by both nurses:  Yes  Please list where Mepilex Borders are located:  right ankle, sole of right foot             **SHARE this note so that the co-signing nurse is able to place an eSignature**    Co-signer eSignature: Electronically signed by Eduardo Giordano RN on 9/8/21 at 4:38 AM EDT    Does the Patient have Skin Breakdown related to pressure?   No            Isaac Prevention initiated:  YES  Wound Care Orders initiated:  YES      WOC nurse consulted for Pressure Injury (Stage 3,4, Unstageable, DTI, NWPT, Complex wounds)and New or Established Ostomies:  YES     Primary Nurse eSignature: Electronically signed by Adriana Cano RN on 9/7/21 at 10:56 PM EDT

## 2021-09-08 NOTE — PROGRESS NOTES
Pt transported via w/c for testing then will go straight to dialysis from testing. Pt is alert and oriented and able to make needs know upon transport.

## 2021-09-08 NOTE — PROGRESS NOTES
9/7/21 10:54 PM   [address] or Facility: [facility] From: Cheryle Mendoza RE: Lisa Blind RM: [phone] Pt here for a CVA work-up pt is c/o of lower back pain and requesting pain medication. Also, just wanted to clarify the hydralazine order. Dr. Ad Orellana ordered it scheduled q8 per his home medication list and nothing ordered prn. Would you like to add a prn order for hydralazine with parameters due to his CVA work-up. Also, he has heparin ordered would you like me to still give? Thank you  Read 10:59 PM     9/7/21 10:55 PM   Also pt wears Cpap at home and is agreeable to wear ours if you would like to also add in an order for that. Thank you  Read 10:59 PM     9/7/21 11:04 PM   Why do you want to hold heparin? 9/7/21 11:29 PM   I have just seen personally given heparin with a possible CVA and his cardiac history. Just wanted to double check before giving. Read 11:30 PM      9/7/21 11:30 PM   not seen*  Read 11:30 PM      9/7/21 11:31 PM   Usually we rule out head bleed with CT. Lovenox is usually what we give, but given his ESRD she ordered heparin subQ instead.

## 2021-09-08 NOTE — PROGRESS NOTES
Nephro routine consult [facility]. Received call back from Dr. Mitra Landaverde.

## 2021-09-08 NOTE — PROGRESS NOTES
Returns to room a this time from testing and dialysis. VSS. Alert and oriented x 4. Complains of chronic back and bilat hip pain. Positioned in bed for comfort. Call light in reach. Able to make needs known.

## 2021-09-08 NOTE — PROGRESS NOTES
09/08/21 0115   NIV Type   $NIV $Daily Charge   NIV Started/Stopped On   Equipment Type v60   Mode Bilevel   Mask Type Full face mask   Mask Size Large   Settings/Measurements   IPAP 14 cmH20   CPAP/EPAP 5 cmH2O   Rate Ordered 12   Resp 16   FiO2  30 %   I Time/ I Time % 1 s   Vt Exhaled 1023 mL   Minute Volume 16.5 Liters   Mask Leak (lpm) 45 lpm  (pt has beard )   Comfort Level Good   Using Accessory Muscles No   SpO2 97   Breath Sounds   Right Upper Lobe Diminished   Right Middle Lobe Diminished   Right Lower Lobe Diminished   Left Upper Lobe Diminished   Left Lower Lobe Diminished   Patient Observation   Observations pt changed to bipap, apnea on cpap mode    Alarm Settings   Alarms On Y   Press Low Alarm 6 cmH2O   High Pressure Alarm 30 cmH2O   Delay Alarm 20 sec(s)   Resp Rate Low Alarm 6   High Respiratory Rate 40 br/min

## 2021-09-08 NOTE — PROGRESS NOTES
09/08/21 0318   NIV Type   Equipment Type v60   Mode Bilevel   Mask Type Full face mask   Mask Size Large   Settings/Measurements   IPAP 14 cmH20   CPAP/EPAP 5 cmH2O   Rate Ordered 12   Resp 16   Insp Rise Time (%) 1 %   FiO2  30 %   I Time/ I Time % 1 s   Vt Exhaled 852 mL   Minute Volume 13.9 Liters   Mask Leak (lpm) 54 lpm   Comfort Level Good   Using Accessory Muscles No   SpO2 97   Breath Sounds   Right Upper Lobe Diminished   Right Middle Lobe Diminished   Right Lower Lobe Diminished   Left Upper Lobe Diminished   Left Lower Lobe Diminished   Alarm Settings   Alarms On Y   Press Low Alarm 6 cmH2O   High Pressure Alarm 30 cmH2O   Delay Alarm 20 sec(s)   Resp Rate Low Alarm 6   High Respiratory Rate 40 br/min

## 2021-09-08 NOTE — CONSULTS
[facility]  Nephrology Consult Note           Reason for Consult:  ESRD  Requesting Physician:  Dr. Candice Braun    Chief Complaint:    Chief Complaint   Patient presents with    Numbness     left arm numbness, bilateral hand \"tingling\". Glucose 120 in route per EMS.  Aphasia     slurred speech started yesterday morning at 11am, patient states \"they tried to squad me out but I wouldnt let them\". History of Present Illness on 9/8/21:    46 y.o. yo male with PMH of ESRD, HTN, CHFrEF, CAD, DM, COPD who is admitted for possible CVA  Pt noticed the symptoms of slurred speech and left arm tingling on Monday at HD , was asked to go to ED which he didn't .  As the symptoms persisted he came to ED on 9/7 and has been admitted  CT scan of head was wo acute abnormality    Past Medical History:        Diagnosis Date    Ambulatory dysfunction     walker for long distances, SOB with distance    Aortic stenosis     echo 2017    Arthritis     hands and hips    Asthma     Bilateral hilar adenopathy syndrome 6/3/2013    CAD (coronary artery disease)     Dr. Kaity Bryant [facility]) 04/19/2019    EF= 43%    CHF (congestive heart failure) (HCC)     Chronic pain     COPD (chronic obstructive pulmonary disease) ([address]     pulmonology Dr. Pereyra Vivi    Depression     Diabetes mellitus ([address]     borderline    Difficult intravenous access     Emphysema of lung ([address]     ESRD (end stage renal disease) on dialysis ([address]     MWF    Fear of needles     Gastric ulcer     GERD (gastroesophageal reflux disease)     Heart valve problem     bicuspic valve    Hemodialysis patient ([address]     History of spinal fracture     work incident    Hx of blood clots     Bilateral lower extremities; stents in place    Hyperlipidemia     Hypertension     MI (myocardial infarction) ([address] 2019    has had 9 MIs. 2019 was the last    Neuromuscular disorder ([address]     due to CVA    Numbness and tingling in left arm from fistula    Pneumonia     PONV (postoperative nausea and vomiting)     Prolonged emergence from general anesthesia     States requires more medication than most people    Sleep apnea     Uses CPAP    Stroke ([facility]     7mm thalamic cva 2017 deficts left side, left side weakness    TIA (transient ischemic attack)     Unspecified diseases of blood and blood-forming organs        Past Surgical History:        Procedure Laterality Date    AORTIC VALVE REPLACEMENT N/A 10/15/2019    TRANSCATHETER AORTIC VALVE REPLACEMENT FEMORAL APPROACH performed by Kelsi Church MD at [address] [place] [address] Ave Right 7/2/2019    PERITONEAL DIALYSIS CATHETER REMOVAL performed by Mitchell Hidalgo MD at [facility] COLONOSCOPY  2/29/2015    WN    CORONARY ANGIOPLASTY WITH STENT PLACEMENT  05/26/15    CYST REMOVAL  08/14/2013    EXCISION CYSTS, NECK X2 AND ABDOMINAL benign    DIAGNOSTIC CARDIAC CATH LAB PROCEDURE      DIALYSIS FISTULA CREATION Left 10/30/2017    LEFT BRACHIAL CEPHALIC FISTULA    DIALYSIS FISTULA CREATION Left 3/27/2019    LIGATION  AV FISTULA performed by Loraine Jaramillo MD at [address] [place] [address], COLON, DIAGNOSTIC      OTHER SURGICAL HISTORY  02/01/2017    laparoscopic cholecystectomy with intraoperative cholangiogram    OTHER SURGICAL HISTORY  2018    PORT PLACEMENT  - vas cath    OTHER SURGICAL HISTORY Bilateral 06/26/2018    laprascopic peritoneal dialysis catheter placement    OTHER SURGICAL HISTORY Right 09/2018    peritoneal dialysis port placed on right side of abdomen    OTHER SURGICAL HISTORY  05/28/2019    PTA/Stenting R External Iliac artery    NH LAP INSERTION TUNNELED INTRAPERITONEAL CATHETER N/A 9/21/2018    LAPAROSCOPIC PERITONEAL DIALYSIS CATHETER REPLACEMENT performed by Mitchell Hidalgo MD at [address] [place] [address]  01/06/2016    UPPER GASTROINTESTINAL ENDOSCOPY  01/29/2017    possible candida, otherwise normal appearing    VASCULAR SURGERY  aprx 2 years ago    2 stents placed, each side of groin       Home Medications:    No current facility-administered medications on file prior to encounter. Current Outpatient Medications on File Prior to Encounter   Medication Sig Dispense Refill    torsemide (DEMADEX) 100 MG tablet Take 1 tablet by mouth daily 30 tablet 3    isosorbide mononitrate (IMDUR) 30 MG extended release tablet TAKE 1 TABLET BY MOUTH EVERY DAY 30 tablet 10    pantoprazole (PROTONIX) 40 MG tablet TAKE (1) TABLET BY MOUTH EACH MORNING BEFORE BREAKFAST 30 tablet 1    Calcium Acetate, Phos Binder, 667 MG CAPS TAKE 1 CAPSULE BY MOUTH THREE TIMES DAILY WITH MEALS 90 capsule 3    pravastatin (PRAVACHOL) 40 MG tablet TAKE (1) TABLET BY MOUTH IN THE EVENING 30 tablet 1    QUEtiapine (SEROQUEL) 50 MG tablet TAKE (1) TABLET BY MOUTH IN THE EVENING 30 tablet 2    BASAGLAR KWIKPEN 100 UNIT/ML injection pen ADMINISTER 60 UNITS UNDER THE SKIN EVERY NIGHT 15 mL 5    hydrocortisone (ANUSOL-HC) 2.5 % CREA rectal cream Place rectally 2 times daily 30 g 0    albuterol (PROVENTIL) (2.5 MG/3ML) 0.083% nebulizer solution INHALE 1 VIAL VIA NEBULIZER EVERY 6 HOURS AS NEEDED FOR WHEEZING 300 mL 5    nystatin (MYCOSTATIN) 142323 UNIT/GM cream Apply topically 2 times daily.  60 g 3    famotidine (PEPCID) 20 MG tablet TAKE 1 TABLET BY MOUTH TWICE DAILY AS NEEDED FOR HEARTBURN 180 tablet 0    hydrOXYzine (VISTARIL) 50 MG capsule TAKE 1 TO 2 CAPSULES BY MOUTH NIGHTLY 60 capsule 5    LINZESS 145 MCG capsule TAKE 1 CAPSULE BY MOUTH EVERY MORNING BEFORE BREAKFAST 30 capsule 10    hydrALAZINE (APRESOLINE) 50 MG tablet TAKE 1/2 TABLET BY MOUTH EVERY 8 HOURS 90 tablet 2    traZODone (DESYREL) 150 MG tablet TAKE (1) TABLET BY MOUTH NIGHTLY 30 tablet 10    dilTIAZem (CARDIZEM CD) 180 MG extended release capsule       cyclobenzaprine (FLEXERIL) 10 MG tablet TAKE 1 TABLET BY MOUTH EVERY 8 HOURS AS NEEDED 90 tablet 5    DULoxetine (CYMBALTA) 30 MG extended release capsule TAKE 1 CAPSULE BY MOUTH EVERY DAY 30 capsule 10    tiZANidine (ZANAFLEX) 4 MG tablet TAKE 1 TABLET BY MOUTH THREE TIMES DAILY 90 tablet 10    losartan (COZAAR) 50 MG tablet TAKE 1 TABLET BY MOUTH DAILY 30 tablet 10    simethicone (MYLICON) 80 MG chewable tablet Take 1 tablet by mouth every 6 hours as needed (cramping) 15 tablet 0    nitroGLYCERIN (NITROSTAT) 0.4 MG SL tablet DISSOLVE 1 TABLET UNDER THE TONGUE AS NEEDED FOR CHEST PAIN EVERY 5 MINUTES UP TO 3 TIMES. IF NO RELIEF CALL 911. 25 tablet 10    B Complex-C-Folic Acid (VIRT-CAPS) 1 MG CAPS TK ONE C PO  QD 90 capsule 1    citalopram (CELEXA) 40 MG tablet TAKE (1) TABLET BY MOUTH DAILY 30 tablet 10    insulin aspart (NOVOLOG FLEXPEN) 100 UNIT/ML injection pen Inject 20 Units into the skin 3 times daily (before meals) 15 pen 5    ondansetron (ZOFRAN ODT) 4 MG disintegrating tablet Take 1 tablet by mouth every 8 hours as needed for Nausea 60 tablet 0    blood glucose test strips (FREESTYLE LITE) strip Daily As needed. 100 strip 3    vitamin D (ERGOCALCIFEROL) 92981 units CAPS capsule TK 1 C PO WEEKLY  11    Tiotropium Bromide-Olodaterol (STIOLTO RESPIMAT) 2.5-2.5 MCG/ACT AERS Inhale 2 puffs into the lungs daily 2 Inhaler 0    albuterol sulfate  (90 Base) MCG/ACT inhaler Inhale 2 puffs into the lungs every 6 hours as needed for Wheezing 1 Inhaler 3    ipratropium-albuterol (DUONEB) 0.5-2.5 (3) MG/3ML SOLN nebulizer solution Inhale 3 mLs into the lungs every 6 hours as needed for Shortness of Breath 360 mL 1    calcium carbonate (TUMS) 500 MG chewable tablet Take 1 tablet by mouth 3 times daily as needed for Heartburn.  aspirin 81 MG chewable tablet Take 1 tablet by mouth daily.  (Patient taking differently: Take 81 mg by mouth daily Indications: stopped on 6/25 for surgery ) 30 tablet 2    gabapentin (NEURONTIN) 100 MG capsule TAKE 1 TO 2 CAPSULES BY MOUTH THREE TIMES A  capsule 5    glucose monitoring kit (FREESTYLE) monitoring kit 1 kit by Does not apply route daily 1 kit 0    blood glucose test strips (GLUCOSE METER TEST) strip 1 each by In Vitro route 5 times daily As needed. 100 each 3    glucose monitoring kit (FREESTYLE) monitoring kit 1 kit by Does not apply route daily 1 kit 0    Polyethylene Glycol 3350 GRAN       Glucose Blood (BLOOD GLUCOSE TEST STRIPS) STRP TEST 3-4 TIMES DAILY, AS DIRECTED 100 strip 3    Blood Glucose Monitoring Suppl ADAM USE AS DIRECTED. 1 Device 0    Alcohol Swabs PADS USE AS DIRECTED 300 each 3       Allergies:  Morphine    Social History:    Social History     Socioeconomic History    Marital status:      Spouse name: Not on file    Number of children: Not on file    Years of education: Not on file    Highest education level: Not on file   Occupational History    Not on file   Tobacco Use    Smoking status: Current Some Day Smoker     Packs/day: 0.50     Years: 33.00     Pack years: 16.50     Types: Cigarettes     Last attempt to quit: 2020     Years since quittin.3    Smokeless tobacco: Never Used   Vaping Use    Vaping Use: Never used   Substance and Sexual Activity    Alcohol use: Not Currently     Alcohol/week: 0.0 standard drinks     Comment: occ    Drug use: No    Sexual activity: Yes     Partners: Female     Comment:    Other Topics Concern    Not on file   Social History Narrative    Not on file     Social Determinants of Health     Financial Resource Strain:     Difficulty of Paying Living Expenses:    Food Insecurity:     Worried About Running Out of Food in the Last Year:     Ran Out of Food in the Last Year:    Transportation Needs:     Lack of Transportation (Medical):      Lack of Transportation (Non-Medical):    Physical Activity:     Days of Exercise per Week:     Minutes of Exercise per Session:    Stress:     Feeling of Stress :    Social Connections:     Frequency of Communication with Friends and Family:     Frequency of Social Gatherings with Friends and Family:     Attends Rastafarian Services:     Active Member of Clubs or Organizations:     Attends Club or Organization Meetings:     Marital Status:    Intimate Partner Violence:     Fear of Current or Ex-Partner:     Emotionally Abused:     Physically Abused:     Sexually Abused:        Family History:   Family History   Problem Relation Age of Onset    Diabetes Mother     Heart Disease Father     Kidney Disease Sister         stage 4-kidney failure    Cancer Sister     Heart Disease Sister     Obesity Sister     Cancer Sister     Heart Disease Sister     Obesity Sister     Alcohol Abuse Brother        Review of Systems:   Pertinent positives stated above in HPI. All other 10 systems were reviewed and were negative.      Physical exam:   Constitutional:  VITALS:  BP (!) 142/69   Pulse 78   Temp 97.7 °F (36.5 °C) (Oral)   Resp 16   Wt 250 lb (113.4 kg)   SpO2 97%   BMI 39.16 kg/m²   Gen: alert, awake, nad  HEENT: pupils reactive  Neck: no bruits or jvd noted  Cardiovascular:  S1, S2 without m/r/g; trace lower extremity edema; wounds over the right leg  Respiratory: CTA B without w/r/r; respiratory effort normal  Abdomen:  +bs, soft, nt, nd, no hepatosplenomegaly  Neuro/Psy: AAoriented times 3 ; moves all 4 ext    Data/  Recent Labs     09/07/21  1243 09/08/21  0540   WBC 13.3* 9.2   HGB 10.6* 9.2*   HCT 32.0* 27.4*   MCV 90.9 90.0    299     Recent Labs     09/07/21  1243 09/08/21  0540   * 132*   K 4.9 4.6   CL 94* 95*   CO2 20* 20*   GLUCOSE 130* 154*   PHOS  --  7.8*   BUN 49* 57*   CREATININE 6.1* 7.5*   LABGLOM 10* 8*   GFRAA 12* 9*       Assessment  -ESRD on HD MWF  -HTN   -Anemia  -CKD/MBD w elevated phos  -hyponatremia  -Metabolic acidosis  -Left UE tingling n slurred speech , CVA work up in progress    Plan  -HD on 9/8 w UFG as tolerated up to 4.3L to keep SBP>100  -follow neuro work up  -renal dose meds    Thank you for the consultation. Please do not hesitate to call with questions. Kelli Lee MD  Office: [phone]  Fax:    [phone] [address] [facility]

## 2021-09-08 NOTE — PLAN OF CARE
Problem: Nutrition  Intervention: Swallowing evaluation  Note: Bedside swallow evaluation completed this date. Stephanie Beltran M.A, CCC-SLP  Speech-language pathologist       Problem: Nutrition  Intervention: Aspiration precautions  Note: Bedside swallow evaluation completed this date.     Stephanie Beltran M.A, CCC-SLP  Speech-language pathologist

## 2021-09-08 NOTE — PLAN OF CARE
Treatment time: 3.5 Hours  Net UF: 4354  ML    Pre weight: 122.2 Kg  Post weight: 118 kg  EDW: 118 kg    Access used: LCW TDC   Access function: good with  ml/min    Medications or blood products given: Albumin 12.5gms IVPB for SBP less than 100 once. Regular outpatient schedule: [facility] [address]     Summary of response to treatment: Good,  BP responded well to albumen. Crit line summary: Pre tx HCT 27.0, ending HCT 31.3, no refill present, bV%max 15. 0. ending profile A. Most of tx was in profile B. Copy of dialysis treatment record placed in chart, to be scanned into EMR. Report to Caryl Meredith RN.

## 2021-09-08 NOTE — PROGRESS NOTES
Speech Language Pathology  Facility/Department: [address] SURG/ORTHO   CLINICAL BEDSIDE SWALLOW EVALUATION    NAME: Daniel Kapoor  : 1970  MRN: [id number]    ADMISSION DATE: 2021  ADMITTING DIAGNOSIS: has Acute respiratory failure with hypoxia and hypercapnia ([address] Chronic obstructive pulmonary disease (HCC); CAD S/P percutaneous coronary angioplasty; PVD (peripheral vascular disease) ([address] Nonischemic cardiomyopathy ([address] Sleep apnea; Bicuspid aortic valve; Bilateral hilar adenopathy syndrome; Claudication in peripheral vascular disease ([address] Essential hypertension; Diabetic neuropathy ([address] Type 2 diabetes, uncontrolled, with neuropathy ([address] Passive smoke exposure; Depression with anxiety; Hematoma; Pneumonia of right upper lobe due to infectious organism; DM (diabetes mellitus), secondary, uncontrolled, w/neurologic complic ([address] Hypertensive heart disease with congestive heart failure with preserved left ventricular function ([address] CHF exacerbation ([address] Chest pain; Coronary artery disease involving native coronary artery of native heart without angina pectoris; Obesity (BMI 30-39.9); ZAINAB on CPAP; Degeneration of lumbar or lumbosacral intervertebral disc; Lumbar radiculopathy; Lumbosacral spondylosis without myelopathy; Biliary colic; Symptomatic cholelithiasis; Gastroparesis due to DM ([address] Angina, class IV ([address] Dyspnea; Elevated brain natriuretic peptide (BNP) level; Dyslipidemia; Respiratory distress; Hypoxia; Chest pressure; Hypertensive urgency; Acute on chronic combined systolic and diastolic heart failure ([address] Ischemic cardiomyopathy; Chronic renal failure, stage 5 ([address] Tobacco abuse; CKD stage 4 due to type 2 diabetes mellitus ([address] CVA (cerebral vascular accident) ([address] Arterial ischemic stroke, ICA, right, acute ([address]  Type 2 diabetes mellitus without complication, without long-term current use of insulin ([address] HTN (hypertension), benign; ZAINAB (obstructive sleep apnea); Diarrhea; Pleural effusion; Chronic anemia; Nonrheumatic aortic valve stenosis; Hypervolemia; Hyperkalemia; Obesity; Mucus plugging of bronchi; Hemodialysis-associated hypotension; Left arm pain; Dizziness; ESRD (end stage renal disease) on dialysis ([address] Hypotension due to drugs; Acute diastolic CHF (congestive heart failure) ([address] Neuromuscular disorder ([address] Acute combined systolic and diastolic CHF, NYHA class 4 ([address] Renovascular hypertension; Mixed hyperlipidemia; Cigarette nicotine dependence in remission; Acute respiratory failure ([address] Pulmonary edema; Fluid overload; Diastolic dysfunction; Anemia of chronic disease; SOB (shortness of breath); Steal syndrome of dialysis vascular access ([address] Chronic, continuous use of opioids; Chronic bronchitis ([address] Nasal congestion; Hypercholesteremia; Bradycardia; S/P TAVR (transcatheter aortic valve replacement); Syncope and collapse; Atrial fibrillation ([address] Former smoker; Generalized weakness; DKA, type 1, not at goal [facility]); Bilateral leg weakness; GBS (Guillain-Bellingham syndrome) ([address] Sinus pause; Weakness of both lower extremities; Sinus bradycardia; Ataxia; Peripheral vascular occlusive disease ([address] Cellulitis of right foot; Iliac artery occlusion, right ([address] Cellulitis and abscess of hand; Type 2 diabetes mellitus with hyperglycemia ([address] Acute encephalopathy; Acute hypoxemic respiratory failure (HCC); CHF (congestive heart failure) ([address] and Acute cerebrovascular accident (CVA) ([address] on their problem list.     ONSET DATE: 09/7/21    Recent Chest Xray/ CT HEAD:   Impression   No acute intracranial abnormality.             Impression   No radiographic evidence of acute pulmonary disease.               Date of Eval: 9/8/2021  Evaluating Therapist: JOSE Alcaraz    Current Diet level:  Current Diet : Regular  Current Liquid Diet : Thin    Primary Complaint  Patient admitted for stroke work-up. Bedside swallow evaluation per protocol. Pain:  No complaints of pain. Reason for Referral  Malika Hester was referred for a bedside swallow evaluation to assess the efficiency of his swallow function, identify signs and symptoms of aspiration and make recommendations regarding safe dietary consistencies, effective compensatory strategies, and safe eating environment. HPI:   Per chart: The patient is a 46 y.o. male, with a PMH of ESRD on HD, DM2, HTN, CAD, CVA, ZAINAB on CPAP, and obesity, who presented to [facility] with slurred speech and left arm tingling. The patient states the dialysis staff noticed he speech was slurred on Monday. They advised him to go to the ED, but he declined. He then noticed left arm numbness/tingling the following day, so he decided to seek treatment. The degree was severe and symptoms were persistent. He can not identify any aggravating or alleviating factors. He denies fever, chills, dizziness, headache, focal weakness, chest pain, and SOB. Vision/Hearing  Vision  Vision: Within Functional Limits  Hearing  Hearing: Within functional limits    Oral Motor Deficits  Oral/Motor  Oral Motor: Within functional limits    Oral Phase Dysfunction  Oral Phase  Oral Phase: WFL     Indicators of Pharyngeal Phase Dysfunction  Pharyngeal Phase  Pharyngeal Phase: suspect WFL- no overt s/ of aspiration. No pul,onary concerns. Impression  Dysphagia Diagnosis: Swallow function appears grossly intact  Dysphagia Outcome Severity Scale: Level 7: Normal in all situations     No clinical indication of dysphagia. Oropharyngeal swallow appears grossly intact at the bedside. No overt s/ of aspiration observed across trials. Pt denies all clinical indication of dysphagia. Patient is safe to continue a IDDSI 7/0 Regular, Thin Liquid diet with general safe swallow and reflux precautions. No further ST services warranted.     Treatment Plan  Requires SLP Intervention: No    Recommended Diet and Intervention  Diet Solids Recommendation: Regular  Liquid Consistency Recommendation: Thin  Recommended Form of Meds: Whole with water    Compensatory Swallowing Strategies  General safe swallow precautions    Education  Patient educated on SLP scope of practice. Patient educated on safe swallow strategies and precautions. Patient educated on risks associated with dysphagia. Patient Education Response: Verbalizes understanding;Demonstrated understanding  Safety Devices in place: Yes  Type of devices: All fall risk precautions in place       Therapy Time  SLP Individual Minutes  Time In: 2142  Time Out: 6892  Minutes: 11    Thank you.    Radha Freeman M.A, [facility]-SLP/ CBIS   Acute Speech Therapy: [phone]

## 2021-09-08 NOTE — CONSULTS
In patient Neurology consult        [facility] Neurology      Ervin Nelson MD      Abhilash Epp  1970    Date of Service: 9/8/2021    Referring Physician: Ray Sanford MD      Reason for the consult and CC: Slurred speech and left arm tingling    HPI:   The patient is a 46 y.o. male, with a PMH of ESRD on HD, DM2, HTN, CAD, CVA, ZAINAB on CPAP, and obesity, who presented to [facility] with slurred speech and left arm tingling. The patient states the dialysis staff noticed he speech was slurred on Monday. They advised him to go to the ED, but he declined. He then noticed left arm numbness/tingling the following day, so he decided to seek treatment. The degree was severe and symptoms were persistent. He can not identify any aggravating or alleviating factors. He denies fever, chills, dizziness, headache, focal weakness, chest pain, and SOB.         Family History   Problem Relation Age of Onset    Diabetes Mother     Heart Disease Father     Kidney Disease Sister         stage 4-kidney failure    Cancer Sister     Heart Disease Sister     Obesity Sister     Cancer Sister     Heart Disease Sister     Obesity Sister     Alcohol Abuse Brother      Past Surgical History:   Procedure Laterality Date    AORTIC VALVE REPLACEMENT N/A 10/15/2019    TRANSCATHETER AORTIC VALVE REPLACEMENT FEMORAL APPROACH performed by Farida Gandhi MD at [address] [place] [address] Right 7/2/2019    PERITONEAL DIALYSIS CATHETER REMOVAL performed by Tammie Werner MD at [facility]  2/29/2015    [facility]    CORONARY ANGIOPLASTY WITH STENT PLACEMENT  05/26/15    CYST REMOVAL  08/14/2013    EXCISION CYSTS, NECK X2 AND ABDOMINAL benign    DIAGNOSTIC CARDIAC CATH LAB PROCEDURE      DIALYSIS FISTULA CREATION Left 10/30/2017    LEFT BRACHIAL CEPHALIC FISTULA    DIALYSIS FISTULA CREATION Left 3/27/2019    LIGATION  AV FISTULA performed by Ciera Hermosillo MD at [address] [place] [address] COLON, DIAGNOSTIC      OTHER SURGICAL HISTORY  02/01/2017    laparoscopic cholecystectomy with intraoperative cholangiogram    OTHER SURGICAL HISTORY  2018    PORT PLACEMENT  - vas cath    OTHER SURGICAL HISTORY Bilateral 06/26/2018    laprascopic peritoneal dialysis catheter placement    OTHER SURGICAL HISTORY Right 09/2018    peritoneal dialysis port placed on right side of abdomen    OTHER SURGICAL HISTORY  05/28/2019    PTA/Stenting R External Iliac artery    WI LAP INSERTION TUNNELED INTRAPERITONEAL CATHETER N/A 9/21/2018    LAPAROSCOPIC PERITONEAL DIALYSIS CATHETER REPLACEMENT performed by Tamar Jacob MD at [facility] ENDOSCOPY  01/06/2016    UPPER GASTROINTESTINAL ENDOSCOPY  01/29/2017    possible candida, otherwise normal appearing    VASCULAR SURGERY  aprx 2 years ago    2 stents placed, each side of groin        Past Medical History:   Diagnosis Date    Ambulatory dysfunction     walker for long distances, SOB with distance    Aortic stenosis     echo 2017    Arthritis     hands and hips    Asthma     Bilateral hilar adenopathy syndrome 6/3/2013    CAD (coronary artery disease)     Dr. Allie Sinclair [facility]) 04/19/2019    EF= 43%    CHF (congestive heart failure) (HCC)     Chronic pain     COPD (chronic obstructive pulmonary disease) ([address]     pulmonology Dr. Nigel De La Rosa    Depression     Diabetes mellitus ([address]     borderline    Difficult intravenous access     Emphysema of lung ([address]     ESRD (end stage renal disease) on dialysis ([address]     MWF    Fear of needles     Gastric ulcer     GERD (gastroesophageal reflux disease)     Heart valve problem     bicuspic valve    Hemodialysis patient ([address]     History of spinal fracture     work incident    Hx of blood clots     Bilateral lower extremities; stents in place    Hyperlipidemia     Hypertension     MI (myocardial infarction) ([address] 2019    has had 9 MIs.  2019 was the last    Neuromuscular disorder [facility]     due to CVA    Numbness and tingling in left arm     from fistula    Pneumonia     PONV (postoperative nausea and vomiting)     Prolonged emergence from general anesthesia     States requires more medication than most people    Sleep apnea     Uses CPAP    Stroke ([facility]     7mm thalamic cva 2017 deficts left side, left side weakness    TIA (transient ischemic attack)     Unspecified diseases of blood and blood-forming organs      Social History     Tobacco Use    Smoking status: Current Some Day Smoker     Packs/day: 0.50     Years: 33.00     Pack years: 16.50     Types: Cigarettes     Last attempt to quit: 2020     Years since quittin.3    Smokeless tobacco: Never Used   Vaping Use    Vaping Use: Never used   Substance Use Topics    Alcohol use: Not Currently     Alcohol/week: 0.0 standard drinks     Comment: occ    Drug use: No     Allergies   Allergen Reactions    Morphine Nausea And Vomiting     Current Facility-Administered Medications   Medication Dose Route Frequency Provider Last Rate Last Admin    oxyCODONE-acetaminophen (PERCOCET) 7.5-325 MG per tablet 1 tablet  1 tablet Oral Q4H PRN JAY Suarez - CNP   1 tablet at 21 0904    albuterol sulfate  (90 Base) MCG/ACT inhaler 2 puff  2 puff Inhalation Q6H PRN Natalia Quinones MD        aspirin chewable tablet 81 mg  81 mg Oral Daily Natalia Quinones MD   81 mg at 21 0812    insulin glargine (LANTUS) injection vial 45 Units  45 Units SubCUTAneous Nightly Natalia Quinones MD   45 Units at 21 2335    citalopram (CELEXA) tablet 40 mg  40 mg Oral Daily Natalia Quinones MD   40 mg at 21 5131    DULoxetine (CYMBALTA) extended release capsule 30 mg  30 mg Oral Daily Natalia Quinones MD   30 mg at 21 1184    [Held by provider] hydrALAZINE (APRESOLINE) tablet 25 mg  25 mg Oral 3 times per day Natalia Quinones MD       [facility] ipratropium-albuterol (DUONEB) nebulizer solution 3 mL  1 vial Inhalation Q6H PRN Heaven Amador MD        isosorbide mononitrate (IMDUR) extended release tablet 30 mg  30 mg Oral Daily Heaven Amador MD   30 mg at 09/08/21 1864    linaclotide (LINZESS) capsule 145 mcg  145 mcg Oral QAM AC Heaven Amador MD        losartan (COZAAR) tablet 50 mg  50 mg Oral Daily Heaven Amador MD   50 mg at 09/08/21 0812    pantoprazole (PROTONIX) tablet 40 mg  40 mg Oral QAM AC Heaven Amador MD   40 mg at 09/08/21 0617    pravastatin (PRAVACHOL) tablet 20 mg  20 mg Oral Nightly Heaven Amador MD   20 mg at 09/07/21 2334    QUEtiapine (SEROQUEL) tablet 50 mg  50 mg Oral Nightly Heaven Amador MD   50 mg at 09/07/21 2334    tiotropium-olodaterol (STIOLTO) 2.5-2.5 MCG/ACT inhaler 2 puff  2 puff Inhalation Daily Heaven Amador MD   2 puff at 09/08/21 0843    torsemide (DEMADEX) tablet 100 mg  100 mg Oral Daily Heaven Amador MD   100 mg at 09/08/21 8167    traZODone (DESYREL) tablet 150 mg  150 mg Oral Nightly Heaven Amador MD   150 mg at 09/07/21 2334    sodium chloride flush 0.9 % injection 5-40 mL  5-40 mL IntraVENous 2 times per day Heaven Amador MD   10 mL at 09/08/21 0814    sodium chloride flush 0.9 % injection 5-40 mL  5-40 mL IntraVENous PRN Heaven Amador MD        0.9 % sodium chloride infusion  25 mL IntraVENous PRN Heaven Amador MD        ondansetron (ZOFRAN-ODT) disintegrating tablet 4 mg  4 mg Oral Q8H PRN Heaven Amador MD        Or    ondansetron (ZOFRAN) injection 4 mg  4 mg IntraVENous Q6H PRN Heaven Amador MD        polyethylene glycol (GLYCOLAX) packet 17 g  17 g Oral Daily PRN Heaven Amador MD        heparin (porcine) injection 5,000 Units  5,000 Units SubCUTAneous 3 times per day Heaven Amador MD   5,000 Units at 09/08/21 0617    cefTRIAXone (ROCEPHIN) 1000 mg IVPB in 50 mL D5W minibag  1,000 mg IntraVENous Q24H Heaven Amador MD  insulin lispro (HUMALOG) injection vial 0-6 Units  0-6 Units SubCUTAneous TID WC Lamar School, MD   1 Units at 09/08/21 0815    insulin lispro (HUMALOG) injection vial 0-3 Units  0-3 Units SubCUTAneous Nightly Willard Hawk MD           ROS : A 10-14 system review of constitutional, cardiovascular, respiratory, eyes, musculoskeletal, endocrine, GI, ENT, skin, hematological, genitourinary, psychiatric and neurologic systems was obtained and updated today and is unremarkable except as mentioned in my HPI      Exam:     Constitutional:   Vitals:    09/08/21 0250 09/08/21 0318 09/08/21 0801 09/08/21 0846   BP: 134/88  (!) 142/69    Pulse: 72  78    Resp: 17 16 16    Temp: 97.2 °F (36.2 °C)  97.7 °F (36.5 °C)    TempSrc: Axillary  Oral    SpO2: 96%  96% 97%   Weight:           General appearance and observation: Normal development and appear in no acute distress. Seen on dialysis. Neck: supple  Cardiovascular: No lower leg edema with good pulsation. Mental Status:   Oriented to person, place, problem, and time. Memory: Good immediate recall. Intact remote memory  Normal attention span and concentration. Language: very mild dysarthria. Good fund of Knowledge. Aware of current events and vocabulary   Cranial Nerves:   II: Visual fields: Full. Pupils: equal, round, reactive to light  III,IV,VI: Extra Ocular Movements are intact. No nystagmus  V: Facial sensation is intact  VII: Facial strength and movements: intact and symmetric  VIII: Hearing: Intact  IX: Palate elevation is symmetric  XI: Shoulder shrug is intact  XII: Tongue movements are normal  Musculoskeletal: 4/5 LUE and LLE. Tone: Normal tone. Planters: flexor bilaterally. Coordination: no pronator drift, no dysmetria with FNF in upper extremities. Normal REM. Sensation: numbness/tingling bilateral feet, chronic from neuropathy. Gait/Posture: did not test gait.       Data:  LABS:   Lab Results   Component Value Date     09/08/2021    K 4.6 09/08/2021    K 4.9 08/29/2021    CL 95 09/08/2021    CO2 20 09/08/2021    BUN 57 09/08/2021    CREATININE 7.5 09/08/2021    GFRAA 9 09/08/2021    GFRAA >60 05/17/2013    LABGLOM 8 09/08/2021    GLUCOSE 154 09/08/2021    PHOS 7.8 09/08/2021    MG 2.00 09/03/2021    CALCIUM 8.7 09/08/2021     Lab Results   Component Value Date    WBC 9.2 09/08/2021    RBC 3.04 09/08/2021    HGB 9.2 09/08/2021    HCT 27.4 09/08/2021    MCV 90.0 09/08/2021    RDW 16.1 09/08/2021     09/08/2021     Lab Results   Component Value Date    INR 0.95 07/27/2021    PROTIME 10.7 07/27/2021       Neuroimaging was independently reviewed by myself and discussed results with the patient and/or family  Reviewed notes from different physicians  Reviewed lab and blood testing    Impression:    Acute slurred speech and left arm numbness/tingling. Possible TIA vs CVA. ESRD on HD  HTN  CAD  DM2 with neuropathy, uncontrolled. HLD  Obesity  ZAINAB    Recommendation:     MRI of brain and carotid duplex ordered. Recent ECHO 1/21 - no need to repeat if MRI negative. Monitor on tele. Continue ASA, statin. Continue home BP meds. Avoid hypotension. . Recent A1c 10. Continue home Lantus. Add SSI. Improve glycemic control. Continue home gabapentin and SSRI. Body mass index is 39.16 kg/m². Complicating assessment and treatment. Placing patient at risk for multiple co-morbidities as well as early death and contributing to the patient's presentation. Counseled on weight loss. Nephrology managing HD. Continue CPAP nightly. PT/OT      Thank you for referring such patient. If you have any questions regarding my consult note, please don't hesitate to call me. Yann Holman, CNP    This dictation was generated by voice recognition computer software.  Although all attempts are made to edit the dictation for accuracy, there may be errors in the  transcription that are not intended

## 2021-09-08 NOTE — PROGRESS NOTES
Occupational and Physical Therapy    OT/PT referral received and appreciated. Pt's chart reviewed. Pt currently off floor for testing and dialysis. Will follow up later as schedule permits or next available date.     Electronically signed by SHANE Hinson/L on 9/8/2021 at 9:58 AM   Aurora Brumfield, PT, DPT

## 2021-09-09 ENCOUNTER — APPOINTMENT (OUTPATIENT)
Dept: MRI IMAGING | Age: 51
DRG: 064 | End: 2021-09-09
Payer: COMMERCIAL

## 2021-09-09 LAB
GLUCOSE BLD-MCNC: 129 MG/DL (ref 70–99)
GLUCOSE BLD-MCNC: 155 MG/DL (ref 70–99)
GLUCOSE BLD-MCNC: 210 MG/DL (ref 70–99)
GLUCOSE BLD-MCNC: 226 MG/DL (ref 70–99)
PERFORMED ON: ABNORMAL

## 2021-09-09 PROCEDURE — 70551 MRI BRAIN STEM W/O DYE: CPT

## 2021-09-09 PROCEDURE — 99233 SBSQ HOSP IP/OBS HIGH 50: CPT | Performed by: PSYCHIATRY & NEUROLOGY

## 2021-09-09 PROCEDURE — 97530 THERAPEUTIC ACTIVITIES: CPT

## 2021-09-09 PROCEDURE — 6370000000 HC RX 637 (ALT 250 FOR IP): Performed by: REGISTERED NURSE

## 2021-09-09 PROCEDURE — 97110 THERAPEUTIC EXERCISES: CPT

## 2021-09-09 PROCEDURE — 94640 AIRWAY INHALATION TREATMENT: CPT

## 2021-09-09 PROCEDURE — 6370000000 HC RX 637 (ALT 250 FOR IP): Performed by: INTERNAL MEDICINE

## 2021-09-09 PROCEDURE — 97166 OT EVAL MOD COMPLEX 45 MIN: CPT

## 2021-09-09 PROCEDURE — 6360000002 HC RX W HCPCS: Performed by: INTERNAL MEDICINE

## 2021-09-09 PROCEDURE — 6370000000 HC RX 637 (ALT 250 FOR IP): Performed by: NURSE PRACTITIONER

## 2021-09-09 PROCEDURE — 97162 PT EVAL MOD COMPLEX 30 MIN: CPT

## 2021-09-09 PROCEDURE — 1200000000 HC SEMI PRIVATE

## 2021-09-09 PROCEDURE — 94660 CPAP INITIATION&MGMT: CPT

## 2021-09-09 PROCEDURE — 97535 SELF CARE MNGMENT TRAINING: CPT

## 2021-09-09 PROCEDURE — 2580000003 HC RX 258: Performed by: INTERNAL MEDICINE

## 2021-09-09 RX ADMIN — GABAPENTIN 100 MG: 100 CAPSULE ORAL at 09:32

## 2021-09-09 RX ADMIN — HEPARIN SODIUM 5000 UNITS: 5000 INJECTION INTRAVENOUS; SUBCUTANEOUS at 06:03

## 2021-09-09 RX ADMIN — DULOXETINE HYDROCHLORIDE 30 MG: 30 CAPSULE, DELAYED RELEASE ORAL at 09:31

## 2021-09-09 RX ADMIN — TRAZODONE HYDROCHLORIDE 150 MG: 50 TABLET ORAL at 21:10

## 2021-09-09 RX ADMIN — INSULIN GLARGINE 45 UNITS: 100 INJECTION, SOLUTION SUBCUTANEOUS at 21:08

## 2021-09-09 RX ADMIN — LOSARTAN POTASSIUM 50 MG: 25 TABLET, FILM COATED ORAL at 09:31

## 2021-09-09 RX ADMIN — OXYCODONE AND ACETAMINOPHEN 1 TABLET: 7.5; 325 TABLET ORAL at 09:35

## 2021-09-09 RX ADMIN — GABAPENTIN 100 MG: 100 CAPSULE ORAL at 13:52

## 2021-09-09 RX ADMIN — PRAVASTATIN SODIUM 40 MG: 40 TABLET ORAL at 21:09

## 2021-09-09 RX ADMIN — TIOTROPIUM BROMIDE AND OLODATEROL 2 PUFF: 3.124; 2.736 SPRAY, METERED RESPIRATORY (INHALATION) at 09:02

## 2021-09-09 RX ADMIN — HEPARIN SODIUM 5000 UNITS: 5000 INJECTION INTRAVENOUS; SUBCUTANEOUS at 21:58

## 2021-09-09 RX ADMIN — TORSEMIDE 100 MG: 100 TABLET ORAL at 09:31

## 2021-09-09 RX ADMIN — CITALOPRAM HYDROBROMIDE 40 MG: 20 TABLET ORAL at 09:31

## 2021-09-09 RX ADMIN — QUETIAPINE FUMARATE 50 MG: 25 TABLET ORAL at 21:10

## 2021-09-09 RX ADMIN — ISOSORBIDE MONONITRATE 30 MG: 30 TABLET, EXTENDED RELEASE ORAL at 09:31

## 2021-09-09 RX ADMIN — PANTOPRAZOLE SODIUM 40 MG: 40 TABLET, DELAYED RELEASE ORAL at 06:03

## 2021-09-09 RX ADMIN — HEPARIN SODIUM 5000 UNITS: 5000 INJECTION INTRAVENOUS; SUBCUTANEOUS at 13:52

## 2021-09-09 RX ADMIN — SODIUM CHLORIDE, PRESERVATIVE FREE 10 ML: 5 INJECTION INTRAVENOUS at 21:11

## 2021-09-09 RX ADMIN — INSULIN LISPRO 1 UNITS: 100 INJECTION, SOLUTION INTRAVENOUS; SUBCUTANEOUS at 21:09

## 2021-09-09 RX ADMIN — OXYCODONE AND ACETAMINOPHEN 1 TABLET: 7.5; 325 TABLET ORAL at 21:15

## 2021-09-09 RX ADMIN — GABAPENTIN 100 MG: 100 CAPSULE ORAL at 21:09

## 2021-09-09 RX ADMIN — ASPIRIN 81 MG: 81 TABLET, CHEWABLE ORAL at 09:31

## 2021-09-09 ASSESSMENT — PAIN SCALES - GENERAL
PAINLEVEL_OUTOF10: 10
PAINLEVEL_OUTOF10: 8

## 2021-09-09 NOTE — PROGRESS NOTES
Hospitalist Progress Note      PCP: Arya Tobin MD    Date of Admission: 9/7/2021    Chief Complaint: [facility] Course: H&P reviewed     Subjective: Pt is on RA. Afebrile. VSS. +left arm numbness with intermittent slurred speech. No other specific complaints. Medications:  Reviewed    Infusion Medications    sodium chloride       Scheduled Medications    pravastatin  40 mg Oral Nightly    gabapentin  100 mg Oral TID    [START ON 9/10/2021] epoetin siddharth-epbx  1,600 Units IntraVENous Once per day on Mon Wed Fri    [START ON 9/10/2021] doxercalciferol  3 mcg IntraVENous Q MWF    aspirin  81 mg Oral Daily    insulin glargine  45 Units SubCUTAneous Nightly    citalopram  40 mg Oral Daily    DULoxetine  30 mg Oral Daily    [Held by provider] hydrALAZINE  25 mg Oral 3 times per day    isosorbide mononitrate  30 mg Oral Daily    linaclotide  145 mcg Oral QAM AC    losartan  50 mg Oral Daily    pantoprazole  40 mg Oral QAM AC    QUEtiapine  50 mg Oral Nightly    tiotropium-olodaterol  2 puff Inhalation Daily    torsemide  100 mg Oral Daily    traZODone  150 mg Oral Nightly    sodium chloride flush  5-40 mL IntraVENous 2 times per day    heparin (porcine)  5,000 Units SubCUTAneous 3 times per day    insulin lispro  0-6 Units SubCUTAneous TID WC    insulin lispro  0-3 Units SubCUTAneous Nightly     PRN Meds: perflutren lipid microspheres, oxyCODONE-acetaminophen, albumin human, perflutren lipid microspheres, heparin (porcine), albuterol sulfate HFA, ipratropium-albuterol, sodium chloride flush, sodium chloride, ondansetron **OR** ondansetron, polyethylene glycol    No intake or output data in the 24 hours ending 09/09/21 3537    Physical Exam Performed:    BP (!) 103/50   Pulse 79   Temp 97.7 °F (36.5 °C) (Oral)   Resp 16   Wt 260 lb 2.3 oz (118 kg)   SpO2 96%   BMI 40.74 kg/m²     General appearance:  No apparent distress, appears stated age and cooperative.   HEENT: midbrain and right thalamus. Mild parenchymal volume loss. Mild chronic microvascular disease. VL DUP CAROTID BILATERAL         CT HEAD WO CONTRAST   Final Result   No acute intracranial abnormality. XR CHEST PORTABLE   Final Result   No radiographic evidence of acute pulmonary disease. Assessment/Plan:    Active Hospital Problems    Diagnosis     Speech problem [R47.9]     Left face and left arm tingling [R20.2]     Urinary tract infection with hematuria [N39.0, R31.9]     Acute cerebrovascular accident (CVA) ([address] [I63.9]        Dysarthria with associated left arm paresthesia due to acute CVA:  - CT head showed no acute abnormality. CTA head and neck showed less than 50% stenosis of bilateral ICAs, final read is pending.    - MRI brain showed small acute infarct in the right cerebellar hemisphere.   - Obtain ECHO with bubble study.   - Monitor pt on telemetry.   - PT/OT/SLP evaluations.   - Neurology consulted/following -- appreciate. - Continue aspirin and statin. - Needs secondary stroke prevention (DM2, HTN, HLD, tobacco abuse, ZAINAB, obesity). DM2:  - Poorly controlled, A1C 9.9.  - Continue basal bolus insulin regimen -- monitor and adjust as needed. - Carb-control diet.      ESRD on HD M/W/F:  - Nephrology consulted for HD management -- appreciate.      Chronic troponin elevation:  - EKG non-acute, no complaints of angina.   - Stable and consistent with his baseline.   - Likely secondary to reduced renal clearance in setting of ESRD. HTN:  - Poorly controlled on admission, improved now. - Continue home losartan. Resume hydralazine/cardizem once CVA ruled out to allow for permissive HTN. CAD, AS s/p TAVR:  - Continue aspirin, statin and imdur. HLD:  - Continue statin.      Urinary complaints and mildly abnormal UA:  - Urine culture showed no growth.  Dc'd IV rocephin.      ZAINAB:  - CPAP QHS and during the day with naps.      COPD:  - Stable, continue home inhalers. Anemia:  - Secondary to ESRD. On epoetin. Stable H&H. No overt signs of bleeding. Monitor.      Chronic leg wounds:  - Wound care consulted.      Tobacco abuse:  - Counseled on cessation. Obesity:  - With Body mass index is 40.74 kg/m². Complicating assessment and treatment. Placing patient at risk for multiple co-morbidities as well as early death and contributing to the patient's presentation. Counseled on weight loss. DVT Prophylaxis: SQ heparin   Diet: ADULT DIET;  Regular; 4 carb choices (60 gm/meal)  Code Status: Full Code    PT/OT Eval Status: ordered     Dispo - likely home tomorrow [address], JAY - CNP

## 2021-09-09 NOTE — PROGRESS NOTES
Janet Sharma  Neurology Follow-up  [facility] Neurology    Date of Service: 9/9/2021    Subjective:   CC: Follow up today regarding: Slurred speech and left arm tingling    Events noted. Chart and lab reviewed. Still with dysarthria and numbness/tingling left hand. ROS : A 10-12 system review obtained and updated today and is unremarkable except as mentioned  in my interval history. family history includes Alcohol Abuse in his brother; Cancer in his sister and sister; Diabetes in his mother; Heart Disease in his father, sister, and sister; Kidney Disease in his sister; Obesity in his sister and sister.     Past Medical History:   Diagnosis Date    Ambulatory dysfunction     walker for long distances, SOB with distance    Aortic stenosis     echo 2017    Arthritis     hands and hips    Asthma     Bilateral hilar adenopathy syndrome 6/3/2013    CAD (coronary artery disease)     Dr. Abdirizak French [facility]) 04/19/2019    EF= 43%    CHF (congestive heart failure) (HCC)     Chronic pain     COPD (chronic obstructive pulmonary disease) ([address]     pulmonology Dr. Magy Freedman    Depression     Diabetes mellitus ([address]     borderline    Difficult intravenous access     Emphysema of lung ([address]     ESRD (end stage renal disease) on dialysis ([address]     MWF    Fear of needles     Gastric ulcer     GERD (gastroesophageal reflux disease)     Heart valve problem     bicuspic valve    Hemodialysis patient ([address]     History of spinal fracture     work incident    Hx of blood clots     Bilateral lower extremities; stents in place    Hyperlipidemia     Hypertension     MI (myocardial infarction) ([address] 2019    has had 9 MIs. 2019 was the last    Neuromuscular disorder ([address]     due to CVA    Numbness and tingling in left arm     from fistula    Pneumonia     PONV (postoperative nausea and vomiting)     Prolonged emergence from general anesthesia     States requires more medication than most people    Sleep apnea     Uses CPAP    Stroke ([facility]     7mm thalamic cva 2017 deficts left side, left side weakness    TIA (transient ischemic attack)     Unspecified diseases of blood and blood-forming organs      Current Facility-Administered Medications   Medication Dose Route Frequency Provider Last Rate Last Admin    perflutren lipid microspheres (DEFINITY) injection 1.65 mg  1.5 mL IntraVENous ONCE PRN Ritu Weldon APRN - CNP        oxyCODONE-acetaminophen (PERCOCET) 7.5-325 MG per tablet 1 tablet  1 tablet Oral Q4H PRN Ritu Weldon APRN - CNP   1 tablet at 09/09/21 0935    pravastatin (PRAVACHOL) tablet 40 mg  40 mg Oral Nightly Ioana Fetch, APRN - CNP   40 mg at 09/08/21 2037    gabapentin (NEURONTIN) capsule 100 mg  100 mg Oral TID Ioana Dacosta APRN - CNP   100 mg at 09/09/21 0932    albumin human 25 % IV solution 12.5 g  12.5 g IntraVENous PRN Maryanne Briscoe MD   Stopped at 09/08/21 1453    perflutren lipid microspheres (DEFINITY) injection 1.65 mg  1.5 mL IntraVENous ONCE PRN JAY Kramer - CNP        [START ON 9/10/2021] epoetin siddharth-epbx (RETACRIT) injection 1,600 Units  1,600 Units IntraVENous Once per day on Mon Wed Fri Maryanne Briscoe MD       Yuval Self [START ON 9/10/2021] doxercalciferol (HECTOROL) injection 3 mcg  3 mcg IntraVENous Q MWF Maryanne Briscoe MD        heparin (porcine) injection 4,000 Units  4,000 Units IntraCATHeter PRN Maryanne Briscoe MD        albuterol sulfate  (90 Base) MCG/ACT inhaler 2 puff  2 puff Inhalation Q6H PRN Willard Hawk MD        aspirin chewable tablet 81 mg  81 mg Oral Daily Willard Hawk MD   81 mg at 09/09/21 0931    insulin glargine (LANTUS) injection vial 45 Units  45 Units SubCUTAneous Nightly Willard Hawk MD   45 Units at 09/08/21 2037    citalopram (CELEXA) tablet 40 mg  40 mg Oral Daily Willard Hawk MD   40 mg at 09/09/21 0931    DULoxetine (CYMBALTA) extended release capsule 30 mg  30 mg Oral Daily Vincent Frazier MD   30 mg at 09/09/21 0931    [Held by provider] hydrALAZINE (APRESOLINE) tablet 25 mg  25 mg Oral 3 times per day Vincent Frazier MD        ipratropium-albuterol (DUONEB) nebulizer solution 3 mL  1 vial Inhalation Q6H PRN Vincent Frazier MD        isosorbide mononitrate (IMDUR) extended release tablet 30 mg  30 mg Oral Daily Vincent Frazier MD   30 mg at 09/09/21 0931    linaclotide (LINZESS) capsule 145 mcg  145 mcg Oral QAM AC Vincent Frazier MD        losartan (COZAAR) tablet 50 mg  50 mg Oral Daily Vincent Frazier MD   50 mg at 09/09/21 0931    pantoprazole (PROTONIX) tablet 40 mg  40 mg Oral QAM AC Vincent Frazier MD   40 mg at 09/09/21 0603    QUEtiapine (SEROQUEL) tablet 50 mg  50 mg Oral Nightly Vincent Frazier MD   50 mg at 09/08/21 2036    tiotropium-olodaterol (STIOLTO) 2.5-2.5 MCG/ACT inhaler 2 puff  2 puff Inhalation Daily Vincent Frazier MD   2 puff at 09/09/21 0902    torsemide (DEMADEX) tablet 100 mg  100 mg Oral Daily Vincent Frazier MD   100 mg at 09/09/21 0931    traZODone (DESYREL) tablet 150 mg  150 mg Oral Nightly Vincent Frazier MD   150 mg at 09/08/21 2036    sodium chloride flush 0.9 % injection 5-40 mL  5-40 mL IntraVENous 2 times per day Vincent Frazier MD   10 mL at 09/08/21 2039    sodium chloride flush 0.9 % injection 5-40 mL  5-40 mL IntraVENous PRN Vincent Frazier MD        0.9 % sodium chloride infusion  25 mL IntraVENous PRN Vincent Frazier MD        ondansetron (ZOFRAN-ODT) disintegrating tablet 4 mg  4 mg Oral Q8H PRN Vincent Frazier MD        Or    ondansetron (ZOFRAN) injection 4 mg  4 mg IntraVENous Q6H PRN Vincent Frazier MD        polyethylene glycol (GLYCOLAX) packet 17 g  17 g Oral Daily PRN Vincent Frazier MD        heparin (porcine) injection 5,000 Units  5,000 Units SubCUTAneous 3 times per day Vincent Frazier MD   5,000 Units at 09/09/21 0603    insulin lispro (HUMALOG) injection vial 0-6 Units  0-6 Units SubCUTAneous TID WC Willard Hawk MD   2 Units at 09/08/21 1732    insulin lispro (HUMALOG) injection vial 0-3 Units  0-3 Units SubCUTAneous Nightly Willard Hawk MD   1 Units at 09/08/21 2037     Allergies   Allergen Reactions    Morphine Nausea And Vomiting      reports that he has been smoking cigarettes. He has a 16.50 pack-year smoking history. He has never used smokeless tobacco. He reports previous alcohol use. He reports that he does not use drugs. Objective:  Exam:   Constitutional:   Vitals:    09/08/21 2355 09/09/21 0436 09/09/21 0902 09/09/21 1024   BP:  128/65     Pulse:  75  90   Resp: 14 16 16    Temp:  97.6 °F (36.4 °C)     TempSrc:  Oral     SpO2:  96% 94% 96%   Weight:         General appearance:  Normal development and appear in no acute distress. Mental Status:   Oriented to person, place, problem, and time. Memory: Good immediate recall. Intact remote memory  Normal attention span and concentration. Language: mild dysarthria. Good fund of Knowledge. Cranial Nerves:   II: Visual fields: Full. Pupils: equal, round, reactive to light  III,IV,VI: Extra Ocular Movements are intact. No nystagmus  V: Facial sensation is intact  VII: Facial strength and movements: intact and symmetric  IX: Palate elevation is symmetric  XI: Shoulder shrug is intact  XII: Tongue movements are normal  Musculoskeletal:  4/5 LUE and LLE. Tone: Normal tone. Coordination: no pronator drift, no dysmetria with FNF  Sensation: numbness/tingling bilateral feet, chronic from neuropathy. Gait/Posture: did not test gait.           Data:  LABS:   Lab Results   Component Value Date     09/08/2021    K 4.6 09/08/2021    K 4.9 08/29/2021    CL 95 09/08/2021    CO2 20 09/08/2021    BUN 57 09/08/2021    CREATININE 7.5 09/08/2021    GFRAA 9 09/08/2021    GFRAA >60 05/17/2013    LABGLOM 8 09/08/2021    GLUCOSE 154 09/08/2021    PHOS 7.8 09/08/2021    MG 2.00 09/03/2021    CALCIUM 8.7 09/08/2021     Lab Results   Component Value Date    WBC 9.2 09/08/2021    RBC 3.04 09/08/2021    HGB 9.2 09/08/2021    HCT 27.4 09/08/2021    MCV 90.0 09/08/2021    RDW 16.1 09/08/2021     09/08/2021     Lab Results   Component Value Date    INR 0.95 07/27/2021    PROTIME 10.7 07/27/2021       Neuroimaging was independently reviewed by me and discussed results with the patient  I reviewed blood testing and other test results and discussed results with the patient      Impression:    Acute slurred speech and left arm numbness/tingling - due to acute, right cerebellar ischemic CVA in the setting of uncontrolled diabetes, tobacco abuse, and HTN. ESRD on HD  HTN, controlled. CAD  DM2 with neuropathy, uncontrolled. HLD  Obesity  ZAINAB  Tobacco dependence       Recommendation    ECHO with bubble study. Monitor on tele. Continue ASA, statin. Continue home BP meds. Avoid hypotension. . Recent A1c 10. Continue home Lantus. Add SSI. Improve glycemic control. Continue home gabapentin and SSRI. Body mass index is 74.32 kg/m². Complicating assessment and treatment. Placing patient at risk for multiple co-morbidities as well as early death and contributing to the patient's presentation. Counseled on weight loss. Nephrology managing HD. Continue CPAP nightly. PT/OT/SLP  Smoking cessation encouraged. Discussed secondary stroke prevention with patient. Cornelia Hamilton CNP      This dictation was generated by voice recognition computer software. Although all attempts are made to edit the dictation for accuracy, there may be errors in the transcription that are not intended.

## 2021-09-09 NOTE — CARE COORDINATION
CASE MANAGEMENT INITIAL ASSESSMENT    Reviewed chart and completed assessment with: Patient  Explained Case Management role/services. Primary contact information: [address] Decision Maker :   Primary Decision Maker: Crystal Ann - Spouse - [phone]          Can this person be reached and be able to respond quickly, such as within a few minutes or hours? Yes    Admit date/status:09/07/2021 Inpatient   Diagnosis:Acute cerebrovascular accident  Is this a Readmission?:  Yes  08/29-09/03 re COPD exacerbation     Insurance:[facility] required for SNF: No       3 night stay required: No    Living arrangements, Adls, care needs, prior to admission:Patient reports living at home with spouse, ramp entrance, uses RW for ambulation    Transportation: Active , reports family can support on days not feeling great, uses [employer], [employer], and Medicare with little succuss     Durable Medical Equipment at home:  Walker_x_Cane__RTS__ BSC__Shower Chair__  02__ HHN__ CPAP_x_  BiPap__  Hospital Bed__ W/C___ Other__________    Services in the home and/or outpatient, prior to admission: Active with [facility]-writer placed call to notify inpatient status     PT/OT recs:home with 24 hr     HD- [facility] MWF chair time 11:45 am     Hospital Exemption Notification (HEN):NA    Barriers to discharge: Denies     Plan/comments: Patient from home, with spouse. Patient uses RW for ambulation. Patient with HD MWF 11:45 am, has transportation. Patient reports has Cpap art home, denies new needs. MICHELLE Hernandez       ECOC on chart for MD signature

## 2021-09-09 NOTE — CONSULTS
Consulted for wounds on R Anterior Ankle, R Great Toe and R 2nd Toe, all current wounds healing and intact, applied betadine to wounds, wound care orders placed. Wound Care to sign off, please re-consult for changes or deterioration.

## 2021-09-09 NOTE — PROGRESS NOTES
Physical Therapy    Facility/Department: [facility] C5 - MED SURG/ORTHO  Initial Assessment    NAME: Mercedez Flores  : 1970  MRN: [id number]    Date of Service: 2021    Discharge Recommendations:  24 hour supervision or assist   PT Equipment Recommendations  Equipment Needed: No  If pt is unable to be seen after this session, please let this note serve as discharge summary. Please see case management note for discharge disposition. Thank you. Assessment   Body structures, Functions, Activity limitations: Decreased balance;Decreased endurance  Assessment: Pt functioning below baseline after experiencing change in speech and tingling in UE during HD. Pt with limited mobility at baseline using rollator and ambulating only short distance. PT with a h/o falls and has received therapy numerous times. Pt was able to ambulate in the room with RW but at increased risks of falls. Pt stated he was not going to be complient wt calling out for nursing to assist. PT will continue to follow while IP but not recommending HHPT as pt reports it is difficult to schedule around HD and other appointments. Pt familiar with HEP. Treatment Diagnosis: decreased mobility  Prognosis: Fair  Decision Making: Medium Complexity  PT Education: Goals; General Safety;Gait Training;PT Role;Functional Mobility Training  Patient Education: Will need reinforcement on safe mobility with assist  Barriers to Learning: readiness to learn  REQUIRES PT FOLLOW UP: Yes  Activity Tolerance  Activity Tolerance: Patient limited by fatigue;Patient limited by endurance  Activity Tolerance: 94% SpO2, HR 91, 124/78 BP       Patient Diagnosis(es): The primary encounter diagnosis was Speech problem. Diagnoses of Left face and left arm tingling, Chronic kidney disease with end stage renal failure on dialysis [facility]), and Urinary tract infection with hematuria, site unspecified were also pertinent to this visit.      has a past medical history of Ambulatory rehabilitation services?: Yes  Diagnosis: slurred speech  Follows Commands: Within Functional Limits  General Comment  Comments: cleared by nursing  Subjective  Subjective: pt resting in bed. Reports pain in back and hips 10/10. Pre Treatment Pain Screening  Intervention List: Patient able to continue with treatment;Patient declined any intervention    Orientation  Orientation  Overall Orientation Status: Within Normal Limits  Social/Functional History  Social/Functional History  Lives With: Spouse (available 24/7)  Type of Home: Mobile home  Home Layout: One level  Home Access: Ramped entrance  Bathroom Shower/Tub: Walk-in shower, Shower chair with back  Bathroom Toilet: Standard  Bathroom Equipment: Grab bars in shower, Tub transfer [facility]: 4 wheeled walker, Electric scooter  Receives Help From: Personal care attendant (comes in 4 days /week)  ADL Assistance: Independent (with extra time)  [address]: Needs assistance (Has cleaning service and uses grocery delivery service.)  Ambulation Assistance: Independent [employer] with rollator on ramp and walk to toilet, and uses electric scooter in house)  Transfer Assistance: Independent  Active :  Yes  Additional Comments: Smitha Ontiveros day he was admitted to hospital and reports ~10 falls in past.  Cognition        Objective          PROM RLE (degrees)  RLE PROM: WFL  AROM RLE (degrees)  RLE AROM: WFL  PROM LLE (degrees)  LLE PROM: WFL  AROM LLE (degrees)  LLE AROM : WFL  Strength RLE  Strength RLE: WFL  Strength LLE  Strength LLE: WFL  Tone RLE  RLE Tone: Normotonic  Tone LLE  LLE Tone: Normotonic  Motor Control  Gross Motor?: WFL  Sensation  Overall Sensation Status: Impaired (pt reports some tingling in tips of fingers)  Bed mobility  Supine to Sit: Modified independent  Sit to Supine: Unable to assess  Transfers  Sit to Stand: Stand by assistance  Stand to sit: Stand by assistance  Ambulation  Ambulation?: Yes  More Ambulation?: No  Ambulation 1  Surface: level tile  Device: Rolling Walker  Assistance: Contact guard assistance;Stand by assistance  Quality of Gait: wide ALANNAH, short step length. Distance: 22'  Stairs/Curb  Stairs?: No     Balance  Posture: Good  Sitting - Static: Good  Sitting - Dynamic: Good  Standing - Static: Fair  Standing - Dynamic: Fair;-        Plan   Plan  Times per week: 3-5x/week  Current Treatment Recommendations: Strengthening, Balance Training, Endurance Training, Gait Training  Safety Devices  Type of devices:  All fall risk precautions in place, Bed alarm in place, Call light within reach, Gait belt, Patient at risk for falls, Left in bed, Nurse notified  Restraints  Initially in place: No      Goals  Short term goals  Time Frame for Short term goals: 9/12  Short term goal 1: Pt will ambulate x 48' with RW with supervision  Short term goal 2: Pt will participate in B LE exercises to improve functional mobility and endurance by 9/11  Patient Goals   Patient goals : to go home, to be able to walk further       Therapy Time   Individual Concurrent Group Co-treatment   Time In 1015         Time Out 1042         Minutes 27         Timed Code Treatment Minutes: MELISSA Lin

## 2021-09-09 NOTE — PROGRESS NOTES
Constitutional:  VITALS:  /65   Pulse 90   Temp 97.6 °F (36.4 °C) (Oral)   Resp 16   Wt 260 lb 2.3 oz (118 kg)   SpO2 96%   BMI 40.74 kg/m²   Gen: alert, awake, nad  HEENT: pupils reactive  Neck: no bruits or jvd noted  Cardiovascular:  S1, S2 without m/r/g; trace lower extremity edema; wounds over the right leg  Respiratory: CTA B without w/r/r; respiratory effort normal  Abdomen:  +bs, soft, nt, nd, no hepatosplenomegaly  Neuro/Psy: AAoriented times 3 ; moves all 4 ext    Data/  Recent Labs     09/07/21  1243 09/08/21  0540   WBC 13.3* 9.2   HGB 10.6* 9.2*   HCT 32.0* 27.4*   MCV 90.9 90.0    299     Recent Labs     09/07/21  1243 09/08/21  0540   * 132*   K 4.9 4.6   CL 94* 95*   CO2 20* 20*   GLUCOSE 130* 154*   PHOS  --  7.8*   BUN 49* 57*   CREATININE 6.1* 7.5*   LABGLOM 10* 8*   GFRAA 12* 9*     Impression:     Small acute infarction in the right cerebellar hemisphere. Small old infarctions in the left cerebellar hemisphere.  Old lacunar   infarcts in the right cerebellar hemisphere, left midbrain and right thalamus. Mild parenchymal volume loss. Mild chronic microvascular disease. Assessment  -ESRD on HD MWF  -HTN   -Anemia  -CKD/MBD w elevated phos  -hyponatremia  -Metabolic acidosis  -Left UE tingling n slurred speech , CVA R cerebellar     Plan  -HD per [facility] schedule  -renal dose meds    Thank you for the consultation. Please do not hesitate to call with questions. Marianna Harada, MD  Office: [phone]  Fax:    [phone] [address] [facility] normal sinus rhythm

## 2021-09-09 NOTE — PROGRESS NOTES
Occupational Therapy   Occupational Therapy Initial Assessment and Treatment Note 1x  Date: 2021   Patient Name: Yariel Loera  MRN: [id number]     : 1970    Date of Service: 2021    Discharge Recommendations:  Home with assist PRN     Assessment   Assessment: OT lydia completed. Pt admitted for CVA d/t L side weakness and slurred speech. Pt demonstrated ability to perform transfers with RW and reach to LE with SBA. Education provided on safe mobility but pt is resistive to education. He is functioning at baseline for ADLs. No further OT needed. Decision Making: Medium Complexity  OT Education: OT Role;IADL Safety;Equipment  Disease Specific Education: Pt educated on importance of OOB mobility, prevention of complications of bedrest, and general safety during hospitalization. Pt verbalized understanding  Barriers to Learning: Pt refused  REQUIRES OT FOLLOW UP: No  Activity Tolerance  Activity Tolerance: Patient Tolerated treatment well  Safety Devices  Safety Devices in place: Yes  Type of devices: Gait belt;Call light within reach;Nurse notified; Left in bed;Bed alarm in place         Patient Diagnosis(es): The primary encounter diagnosis was Speech problem. Diagnoses of Left face and left arm tingling, Chronic kidney disease with end stage renal failure on dialysis [facility]), and Urinary tract infection with hematuria, site unspecified were also pertinent to this visit.      has a past medical history of Ambulatory dysfunction, Aortic stenosis, Arthritis, Asthma, Bilateral hilar adenopathy syndrome, CAD (coronary artery disease), Cardiomyopathy ([address] CHF (congestive heart failure) ([address] Chronic pain, COPD (chronic obstructive pulmonary disease) ([address] Depression, Diabetes mellitus ([address] Difficult intravenous access, Emphysema of lung ([address] ESRD (end stage renal disease) on dialysis ([address] Fear of needles, Gastric ulcer, GERD (gastroesophageal reflux disease), Heart valve problem, Hemodialysis patient [facility]), History of spinal fracture, Hx of blood clots, Hyperlipidemia, Hypertension, MI (myocardial infarction) ([address] Neuromuscular disorder ([address] Numbness and tingling in left arm, Pneumonia, PONV (postoperative nausea and vomiting), Prolonged emergence from general anesthesia, Sleep apnea, Stroke ([address] TIA (transient ischemic attack), and Unspecified diseases of blood and blood-forming organs. has a past surgical history that includes Tonsillectomy; cyst removal (08/14/2013); Colonoscopy; Coronary angioplasty with stent (05/26/15); vascular surgery (aprx 2 years ago); Colonoscopy (2/29/2015); Upper gastrointestinal endoscopy (01/06/2016); Upper gastrointestinal endoscopy (01/29/2017); other surgical history (02/01/2017); Dialysis fistula creation (Left, 10/30/2017); Diagnostic Cardiac Cath Lab Procedure; other surgical history (2018); other surgical history (Bilateral, 06/26/2018); pr lap insertion tunneled intraperitoneal catheter (N/A, 9/21/2018); other surgical history (Right, 09/2018); Dialysis fistula creation (Left, 3/27/2019); other surgical history (05/28/2019); Endoscopy, colon, diagnostic; Catheter Removal (Right, 7/2/2019); and Aortic valve replacement (N/A, 10/15/2019). Restrictions  Restrictions/Precautions  Restrictions/Precautions: Fall Risk, Up as Tolerated  Position Activity Restriction  Other position/activity restrictions: LUE limb restriction    Subjective   General  Chart Reviewed: Yes  Patient assessed for rehabilitation services?: Yes  Additional Pertinent Hx: admitted d/t slurred speech and LUE tingling, hx wounds on LE  Family / Caregiver Present: No  Referring Practitioner: RAFAEL Terry  Diagnosis: acute CVA  Subjective  Subjective: Pt reports that tingling and numbness has resolved but reports delayed speech today.   General Comment  Comments: RN approved therapy     Social/Functional History  Social/Functional History  Lives With: Spouse (available 24/7)  Type of Home: Mobile home  Home Layout: One level  Home Access: Ramped entrance  Bathroom Shower/Tub: Walk-in shower, Shower chair with back  Bathroom Toilet: Standard  Bathroom Equipment: Grab bars in shower, Tub transfer [facility]: 4 wheeled walker, Electric scooter  Receives Help From: Personal care attendant (comes in 4 days /week)  ADL Assistance: Independent (with extra time)  [address]: Needs assistance (Has cleaning service and uses grocery delivery service.)  Ambulation Assistance: Independent [employer] with rollator on ramp and walk to toilet, and uses electric scooter in house)  Transfer Assistance: Independent  Active : Yes  Additional Comments: Adrianne Monteroost day he was admitted to hospital and reports ~10 falls in past.       Objective   Vision: Impaired  Vision Exceptions: Wears glasses for reading  Hearing: Within functional limits    Orientation  Overall Orientation Status: Within Functional Limits     Balance  Sitting Balance: Supervision  Standing Balance: Stand by assistance (RW)  Standing Balance  Activity: Ambulated in room 2x with SBA and RW. Pt educated on safe mobility and calling staff to assist with transfers/bathroom use. Instruction provided on safe technique which pt disputed. Pt stated that he does not plan to call for help to get to bathroom. The importance of notifying staff of OOB activity was emphasized to reduce risk of falls. Functional Mobility  Functional Mobility Comments: Pt refused bathroom mobility but did ambulate within room 2x. Toilet Transfers  Toilet Transfers Comments: toilet transfer declined  ADL  Feeding: Independent  Grooming: Setup (seated)  LE Dressing: Stand by assistance; Increased time to complete  Toileting: Setup (urinal)  Tone RUE  RUE Tone: Normotonic  Tone LUE  LUE Tone: Normotonic  Coordination  Movements Are Fluid And Coordinated: Yes     Bed mobility  Supine to Sit: Modified independent  Transfers  Stand Pivot Transfers: Stand by assistance (rw)  Sit to stand: Stand by assistance  Stand to sit: Stand by assistance     Cognition  Overall Cognitive Status: Exceptions  Arousal/Alertness: Appropriate responses to stimuli  Following Commands: Follows one step commands consistently  Attention Span: Attends with cues to redirect  Safety Judgement: Decreased awareness of need for safety;Decreased awareness of need for assistance  Problem Solving: Assistance required to identify errors made;Assistance required to correct errors made  Insights: Decreased awareness of deficits  Cognition Comment: Pt is resistive to safety education. Sensation  Overall Sensation Status: Impaired (pt reports some tingling in tips of fingers)      LUE AROM (degrees)  LUE AROM : WFL  RUE AROM (degrees)  RUE AROM : WFL  LUE Strength  Gross LUE Strength: WFL  RUE Strength  Gross RUE Strength: WFL   AM-PAC Score      AM-[facility] Inpatient Daily Activity Raw Score: 21 (09/09/21 1445)  AM-PAC Inpatient ADL T-Scale Score : 44.27 (09/09/21 1445)  ADL Inpatient CMS 0-100% Score: 32.79 (09/09/21 1445)  ADL Inpatient CMS G-Code Modifier : Hilton Guzman (09/09/21 1445)  Goals  Short term goals  Time Frame for Short term goals: 1x  Short term goal 1: Perform functional transfers with SBA and RW (goal met, SBA for bed transfers with RW)  Short term goal 2: Demo ability to reach to BLE for ADLs with SBA (goal met 9/9)  Patient Goals   Patient goals :  \"Be able to walk\"     Therapy Time   Individual Concurrent Group Co-treatment   Time In 1010         Time Out 1043         Minutes 33         Timed Code Treatment Minutes: 23 Minutes (10 min eval)     Selby Lesches OT

## 2021-09-09 NOTE — PROGRESS NOTES
09/08/21 7881   NIV Type   NIV Started/Stopped On   Equipment Type V60   Mode Bilevel   Mask Type Full face mask   Mask Size Large   Settings/Measurements   IPAP 14 cmH20   CPAP/EPAP 5 cmH2O   Rate Ordered 12   Resp 14   FiO2  30 %   I Time/ I Time % 1 s   Vt Exhaled 637 mL   Minute Volume 8.1 Liters   Mask Leak (lpm) 39 lpm   Comfort Level Good   Using Accessory Muscles No   SpO2 97   Alarm Settings   Alarms On Y   Press Low Alarm 6 cmH2O   High Pressure Alarm 30 cmH2O   Delay Alarm 20 sec(s)   Resp Rate Low Alarm 6   High Respiratory Rate 40 br/min

## 2021-09-09 NOTE — PROGRESS NOTES
09/08/21 2246   NIV Type   NIV Started/Stopped On   Equipment Type v60   Mode Bilevel   Mask Type Full face mask   Mask Size Large   Settings/Measurements   IPAP 14 cmH20   CPAP/EPAP 5 cmH2O   Rate Ordered 12   Resp 18   Insp Rise Time (%) 1 %   FiO2  30 %   I Time/ I Time % 1 s   Vt Exhaled 1138 mL   Minute Volume 20.6 Liters   Mask Leak (lpm) 59 lpm   Comfort Level Good   Using Accessory Muscles No   SpO2 96   Breath Sounds   Right Upper Lobe Diminished   Right Middle Lobe Diminished   Right Lower Lobe Diminished   Left Upper Lobe Diminished   Left Lower Lobe Diminished   Alarm Settings   Alarms On Y   Press Low Alarm 6 cmH2O   High Pressure Alarm 30 cmH2O   Delay Alarm 20 sec(s)   Resp Rate Low Alarm 6   High Respiratory Rate 40 br/min

## 2021-09-09 NOTE — DISCHARGE INSTR - COC
Continuity of Care Form    Patient Name: Merrick Kenyon   :  1970  MRN:  [id number]    Admit date:  2021  Discharge date:  21    Code Status Order: Full Code   Advance Directives:      Admitting Physician:  Thomas Thakur MD  PCP: Angelique Stone MD    Discharging Nurse: JACE [facility]ARR[facility] Unit/Room#: 3425/7154-17  Discharging Unit Phone Number: [phone]    Emergency Contact:   Extended Emergency Contact Information  Primary Emergency Contact: AnnFaridaCrystal  Address: [address] [place] [address], [address] [facility],[address] Phone: [phone]  Mobile Phone: [phone]  Relation: Spouse    Past Surgical History:  Past Surgical History:   Procedure Laterality Date    AORTIC VALVE REPLACEMENT N/A 10/15/2019    TRANSCATHETER AORTIC VALVE REPLACEMENT FEMORAL APPROACH performed by Kim Teague MD at [address] [place] [address] Right 2019    PERITONEAL DIALYSIS CATHETER REMOVAL performed by Nan Haque MD at [address] [place] [address]      WN    CORONARY ANGIOPLASTY WITH STENT PLACEMENT  05/26/15    CYST REMOVAL  2013    EXCISION CYSTS, NECK X2 AND ABDOMINAL benign    DIAGNOSTIC CARDIAC CATH LAB PROCEDURE      DIALYSIS FISTULA CREATION Left 10/30/2017    LEFT BRACHIAL CEPHALIC FISTULA    DIALYSIS FISTULA CREATION Left 3/27/2019    LIGATION  AV FISTULA performed by Marija Sharma MD at [address] [place] [address], [place], DIAGNOSTIC      OTHER SURGICAL HISTORY  2017    laparoscopic cholecystectomy with intraoperative cholangiogram    OTHER SURGICAL HISTORY  2018    PORT PLACEMENT  - vas cath    OTHER SURGICAL HISTORY Bilateral 2018    laprascopic peritoneal dialysis catheter placement    OTHER SURGICAL HISTORY Right 2018    peritoneal dialysis port placed on right side of abdomen    OTHER SURGICAL HISTORY  2019    PTA/Stenting R External Iliac artery    VT LAP uncontrolled, with neuropathy ([facility] E11.40, E11.65    Passive smoke exposure Z77.22    Depression with anxiety F41.8    Hematoma T14. 8XXA    Pneumonia of right upper lobe due to infectious organism J18.9    DM (diabetes mellitus), secondary, uncontrolled, w/neurologic complic ([facility] U52.77, H67.03    Hypertensive heart disease with congestive heart failure with preserved left ventricular function (HCC) I11.0, I50.30    CHF exacerbation ([facility]) I50.9    Chest pain R07.9    Coronary artery disease involving native coronary artery of native heart without angina pectoris I25.10    Obesity (BMI 30-39. 9) E66.9    ZAINAB on CPAP G47.33, Z99.89    Degeneration of lumbar or lumbosacral intervertebral disc M51.37    Lumbar radiculopathy M54.16    Lumbosacral spondylosis without myelopathy K45.660    Biliary colic H46.63    Symptomatic cholelithiasis K80.20    Gastroparesis due to DM ([facility]) E11.43, K31.84    Angina, class IV ([facility]) I20.9    Dyspnea R06.00    Elevated brain natriuretic peptide (BNP) level R79.89    Dyslipidemia E78.5    Respiratory distress R06.03    Hypoxia R09.02    Chest pressure R07.89    Hypertensive urgency I16.0    Acute on chronic combined systolic and diastolic heart failure ([facility]) I50.43    Ischemic cardiomyopathy I25.5    Chronic renal failure, stage 5 ([facility]) N18.5    Tobacco abuse Z72.0    CKD stage 4 due to type 2 diabetes mellitus ([facility]) E11.22, N18.4    CVA (cerebral vascular accident) ([facility] I63.9    Arterial ischemic stroke, ICA, right, acute ([facility]) I63.231    Type 2 diabetes mellitus without complication, without long-term current use of insulin ([facility]) E11.9    HTN (hypertension), benign I10    ZAINAB (obstructive sleep apnea) G47.33    Diarrhea R19.7    Pleural effusion J90    Chronic anemia D64.9    Nonrheumatic aortic valve stenosis I35.0    Hypervolemia E87.70    Hyperkalemia E87.5    Obesity E66.9    Mucus plugging of bronchi T17.500A    Hemodialysis-associated hypotension I95.3    Left arm pain M79.602    Dizziness R42    ESRD (end stage renal disease) on dialysis ([facility]) N18.6, Z99.2    Hypotension due to drugs I95.2    Acute diastolic CHF (congestive heart failure) ([facility]) I50.31    Neuromuscular disorder ([facility]) G70.9    Acute combined systolic and diastolic CHF, NYHA class 4 ([facility]) I50.41    Renovascular hypertension I15.0    Mixed hyperlipidemia E78.2    Cigarette nicotine dependence in remission F17.211    Acute respiratory failure ([facility]) J96.00    Pulmonary edema J81.1    Fluid overload Y28.66    Diastolic dysfunction D17.97    Anemia of chronic disease D63.8    SOB (shortness of breath) R06.02    Steal syndrome of dialysis vascular access ([facility]) T82.898A    Chronic, continuous use of opioids F11.90    Chronic bronchitis ([facility]) J42    Nasal congestion R09.81    Hypercholesteremia E78.00    Bradycardia R00.1    S/P TAVR (transcatheter aortic valve replacement) Z95.2    Syncope and collapse R55    Atrial fibrillation ([facility]) I48.91    Former smoker Z87.891    Generalized weakness R53.1    DKA, type 1, not at goal [facility]) E10.10    Bilateral leg weakness R29.898    GBS (Guillain-[place] syndrome) ([facility]) G61.0    Sinus pause I45.5    Weakness of both lower extremities R29.898    Sinus bradycardia R00.1    Ataxia R27.0    Peripheral vascular occlusive disease ([facility]) I73.9    Cellulitis of right foot L03.115    Iliac artery occlusion, right ([facility]) I74.5    Cellulitis and abscess of hand L03.119, L02.519    Type 2 diabetes mellitus with hyperglycemia ([facility]) E11.65    Acute encephalopathy G93.40    Acute hypoxemic respiratory failure ([facility]) J96.01    CHF (congestive heart failure) ([facility]) I50.9    Acute cerebrovascular accident (CVA) ([facility]) I63.9       Isolation/Infection:   Isolation            No Isolation          Patient Infection Status       Infection Onset Added Last Indicated Last Indicated By Review Planned Expiration Resolved Resolved By None active    Resolved    COVID-19 Rule Out 08/29/21 08/29/21 08/29/21 COVID-19 (Ordered)   08/29/21 Rule-Out Test Resulted    COVID-19 Rule Out 12/18/20 12/18/20 12/18/20 COVID-19 (Ordered)   12/18/20 Rule-Out Test Resulted    COVID-19 Rule Out 05/04/20 05/04/20 05/04/20 COVID-19 (Ordered)   05/04/20 Rule-Out Test Resulted            Nurse Assessment:  Last Vital Signs: /65   Pulse 90   Temp 97.6 °F (36.4 °C) (Oral)   Resp 16   Wt 260 lb 2.3 oz (118 kg)   SpO2 96%   BMI 40.74 kg/m²     Last documented pain score (0-10 scale): Pain Level: 10  Last Weight:   Wt Readings from Last 1 Encounters:   09/08/21 260 lb 2.3 oz (118 kg)     Mental Status:  oriented and alert    IV Access:  - Dialysis Catheter  - site  left, insertion date: None    Nursing Mobility/ADLs:  Walking   Independent  Transfer  Independent  Bathing  Independent  Dressing  Independent  Toileting  Independent  Feeding  Independent  Med Admin  Independent  Med Delivery   none    Wound Care Documentation and Therapy:  Wound 08/30/21 Toe (Comment  which one) Right Great Toe (Active)   Wound Image   08/30/21 1524   Wound Etiology Traumatic 09/03/21 1030   Dressing Status Clean;Dry; Intact 09/03/21 1030   Wound Cleansed Cleansed with saline 09/03/21 1030   Dressing/Treatment Hydrating gel; Foam 09/03/21 1030   Dressing Change Due 09/04/21 09/03/21 1030   Wound Length (cm) 0.3 cm 08/30/21 1524   Wound Width (cm) 0.5 cm 08/30/21 1524   Wound Depth (cm) 0.1 cm 08/30/21 1524   Wound Surface Area (cm^2) 0.15 cm^2 08/30/21 1524   Wound Volume (cm^3) 0.015 cm^3 08/30/21 1524   Wound Assessment Pink/red 09/03/21 1030   Drainage Amount None 09/03/21 1030   Drainage Description Sanguinous 09/03/21 1030   Odor None 09/03/21 1030   [place]-wound Assessment Dry/flaky 09/03/21 1030   Number of days: 10        Elimination:  Continence:   · Bowel: No  · Bladder: No  Urinary Catheter:  [place]  Colostomy/Ileostomy/Ileal Conduit: No       Date of Last BM:   No intake or output data in the 24 hours ending 09/09/21 1531  I/O last 3 completed shifts: In: 609.4 [P.O.:542; I.V.:20; IV Piggyback:47.4]  Out: -     Safety Concerns:     None    Impairments/Disabilities:      None    Nutrition Therapy:  Current Nutrition Therapy:   - Oral Diet:  General    Routes of Feeding: Oral  Liquids: No Restrictions  Daily Fluid Restriction: no  Last Modified Barium Swallow with Video (Video Swallowing Test): not done    Treatments at the Time of Hospital Discharge:   Respiratory Treatments: ***  Oxygen Therapy:  is not on home oxygen therapy. Ventilator:    - No ventilator support    Rehab Therapies: Physical Therapy and Occupational Therapy  Weight Bearing Status/Restrictions: No weight bearing restirctions  Other Medical Equipment (for information only, NOT a DME order):  ***  Other Treatments: ***    Patient's personal belongings (please select all that are sent with patient):  None    RN SIGNATURE:  Electronically signed by Steve Montanez RN on 9/12/21 at 11:55 AM EDT    CASE MANAGEMENT/SOCIAL WORK SECTION    Inpatient Status Date: 09/07/2021    Readmission Risk Assessment Score:  Readmission Risk              Risk of Unplanned Readmission:  75         Discharging to Facility/ Agency   [facility]   [address] [place] [address]   [phone]     / signature: Electronically signed by Estefana Alpers, RN on 9/12/21 at 11:06 AM EDT    PHYSICIAN SECTION    Prognosis: Good    Condition at Discharge: Stable    Rehab Potential (if transferring to Rehab): Not applicable     Recommended Labs or Other Treatments After Discharge: Srinivasan Morel    Physician Certification: I certify the above information and transfer of Angelina Mejia  is necessary for the continuing treatment of the diagnosis listed and that he requires [address] for greater 30 days.      Update Admission H&P: No change in H&P    PHYSICIAN SIGNATURE:  Electronically signed by JAY Cool CNP on 9/12/21 at 11:50 AM EDT

## 2021-09-10 LAB
GLUCOSE BLD-MCNC: 155 MG/DL (ref 70–99)
GLUCOSE BLD-MCNC: 191 MG/DL (ref 70–99)
GLUCOSE BLD-MCNC: 282 MG/DL (ref 70–99)
PERFORMED ON: ABNORMAL

## 2021-09-10 PROCEDURE — 99232 SBSQ HOSP IP/OBS MODERATE 35: CPT | Performed by: NURSE PRACTITIONER

## 2021-09-10 PROCEDURE — 6370000000 HC RX 637 (ALT 250 FOR IP): Performed by: INTERNAL MEDICINE

## 2021-09-10 PROCEDURE — 6370000000 HC RX 637 (ALT 250 FOR IP): Performed by: REGISTERED NURSE

## 2021-09-10 PROCEDURE — 94640 AIRWAY INHALATION TREATMENT: CPT

## 2021-09-10 PROCEDURE — 90935 HEMODIALYSIS ONE EVALUATION: CPT

## 2021-09-10 PROCEDURE — 2580000003 HC RX 258: Performed by: INTERNAL MEDICINE

## 2021-09-10 PROCEDURE — 6370000000 HC RX 637 (ALT 250 FOR IP): Performed by: NURSE PRACTITIONER

## 2021-09-10 PROCEDURE — 1200000000 HC SEMI PRIVATE

## 2021-09-10 PROCEDURE — 6360000002 HC RX W HCPCS: Performed by: INTERNAL MEDICINE

## 2021-09-10 RX ORDER — LOSARTAN POTASSIUM 25 MG/1
25 TABLET ORAL DAILY
Status: DISCONTINUED | OUTPATIENT
Start: 2021-09-11 | End: 2021-09-12 | Stop reason: HOSPADM

## 2021-09-10 RX ORDER — CYCLOBENZAPRINE HCL 10 MG
10 TABLET ORAL EVERY 8 HOURS PRN
Status: DISCONTINUED | OUTPATIENT
Start: 2021-09-10 | End: 2021-09-12 | Stop reason: HOSPADM

## 2021-09-10 RX ADMIN — OXYCODONE AND ACETAMINOPHEN 1 TABLET: 7.5; 325 TABLET ORAL at 17:31

## 2021-09-10 RX ADMIN — DULOXETINE HYDROCHLORIDE 30 MG: 30 CAPSULE, DELAYED RELEASE ORAL at 08:51

## 2021-09-10 RX ADMIN — GABAPENTIN 100 MG: 100 CAPSULE ORAL at 17:30

## 2021-09-10 RX ADMIN — HEPARIN SODIUM 5000 UNITS: 5000 INJECTION INTRAVENOUS; SUBCUTANEOUS at 05:41

## 2021-09-10 RX ADMIN — LOSARTAN POTASSIUM 50 MG: 25 TABLET, FILM COATED ORAL at 08:51

## 2021-09-10 RX ADMIN — PRAVASTATIN SODIUM 40 MG: 40 TABLET ORAL at 21:45

## 2021-09-10 RX ADMIN — TORSEMIDE 100 MG: 100 TABLET ORAL at 08:50

## 2021-09-10 RX ADMIN — QUETIAPINE FUMARATE 50 MG: 25 TABLET ORAL at 21:45

## 2021-09-10 RX ADMIN — TIOTROPIUM BROMIDE AND OLODATEROL 2 PUFF: 3.124; 2.736 SPRAY, METERED RESPIRATORY (INHALATION) at 09:12

## 2021-09-10 RX ADMIN — HEPARIN SODIUM 4000 UNITS: 1000 INJECTION INTRAVENOUS; SUBCUTANEOUS at 14:32

## 2021-09-10 RX ADMIN — HEPARIN SODIUM 5000 UNITS: 5000 INJECTION INTRAVENOUS; SUBCUTANEOUS at 17:31

## 2021-09-10 RX ADMIN — CITALOPRAM HYDROBROMIDE 40 MG: 20 TABLET ORAL at 08:50

## 2021-09-10 RX ADMIN — INSULIN GLARGINE 45 UNITS: 100 INJECTION, SOLUTION SUBCUTANEOUS at 21:46

## 2021-09-10 RX ADMIN — INSULIN LISPRO 2 UNITS: 100 INJECTION, SOLUTION INTRAVENOUS; SUBCUTANEOUS at 21:46

## 2021-09-10 RX ADMIN — ISOSORBIDE MONONITRATE 30 MG: 30 TABLET, EXTENDED RELEASE ORAL at 08:51

## 2021-09-10 RX ADMIN — CYCLOBENZAPRINE 10 MG: 10 TABLET, FILM COATED ORAL at 06:56

## 2021-09-10 RX ADMIN — CYCLOBENZAPRINE 10 MG: 10 TABLET, FILM COATED ORAL at 19:58

## 2021-09-10 RX ADMIN — GABAPENTIN 100 MG: 100 CAPSULE ORAL at 08:51

## 2021-09-10 RX ADMIN — GABAPENTIN 100 MG: 100 CAPSULE ORAL at 21:44

## 2021-09-10 RX ADMIN — TRAZODONE HYDROCHLORIDE 150 MG: 50 TABLET ORAL at 21:44

## 2021-09-10 RX ADMIN — OXYCODONE AND ACETAMINOPHEN 1 TABLET: 7.5; 325 TABLET ORAL at 21:45

## 2021-09-10 RX ADMIN — OXYCODONE AND ACETAMINOPHEN 1 TABLET: 7.5; 325 TABLET ORAL at 05:41

## 2021-09-10 RX ADMIN — SODIUM CHLORIDE, PRESERVATIVE FREE 10 ML: 5 INJECTION INTRAVENOUS at 21:50

## 2021-09-10 RX ADMIN — EPOETIN ALFA-EPBX 1600 UNITS: 2000 INJECTION, SOLUTION INTRAVENOUS; SUBCUTANEOUS at 14:32

## 2021-09-10 RX ADMIN — PANTOPRAZOLE SODIUM 40 MG: 40 TABLET, DELAYED RELEASE ORAL at 05:41

## 2021-09-10 RX ADMIN — ASPIRIN 81 MG: 81 TABLET, CHEWABLE ORAL at 08:51

## 2021-09-10 RX ADMIN — INSULIN LISPRO 1 UNITS: 100 INJECTION, SOLUTION INTRAVENOUS; SUBCUTANEOUS at 17:32

## 2021-09-10 RX ADMIN — SODIUM CHLORIDE, PRESERVATIVE FREE 10 ML: 5 INJECTION INTRAVENOUS at 08:51

## 2021-09-10 ASSESSMENT — PAIN SCALES - GENERAL
PAINLEVEL_OUTOF10: 8
PAINLEVEL_OUTOF10: 9
PAINLEVEL_OUTOF10: 10

## 2021-09-10 NOTE — PROGRESS NOTES
Hospitalist Progress Note      PCP: Jaden Gross MD    Date of Admission: 9/7/2021    Chief Complaint: [facility] Course: H&P reviewed     Subjective: Pt is on RA. Afebrile. VSS. +left arm numbness with intermittent slurred speech. No other specific complaints.      Medications:  Reviewed    Infusion Medications    sodium chloride       Scheduled Medications    pravastatin  40 mg Oral Nightly    gabapentin  100 mg Oral TID    epoetin siddharth-epbx  1,600 Units IntraVENous Once per day on Mon Wed Fri    doxercalciferol  3 mcg IntraVENous Q MWF    aspirin  81 mg Oral Daily    insulin glargine  45 Units SubCUTAneous Nightly    citalopram  40 mg Oral Daily    DULoxetine  30 mg Oral Daily    [Held by provider] hydrALAZINE  25 mg Oral 3 times per day    isosorbide mononitrate  30 mg Oral Daily    linaclotide  145 mcg Oral QAM AC    losartan  50 mg Oral Daily    pantoprazole  40 mg Oral QAM AC    QUEtiapine  50 mg Oral Nightly    tiotropium-olodaterol  2 puff Inhalation Daily    torsemide  100 mg Oral Daily    traZODone  150 mg Oral Nightly    sodium chloride flush  5-40 mL IntraVENous 2 times per day    heparin (porcine)  5,000 Units SubCUTAneous 3 times per day    insulin lispro  0-6 Units SubCUTAneous TID WC    insulin lispro  0-3 Units SubCUTAneous Nightly     PRN Meds: cyclobenzaprine, perflutren lipid microspheres, oxyCODONE-acetaminophen, albumin human, perflutren lipid microspheres, heparin (porcine), albuterol sulfate HFA, ipratropium-albuterol, sodium chloride flush, sodium chloride, ondansetron **OR** ondansetron, polyethylene glycol      Intake/Output Summary (Last 24 hours) at 9/10/2021 1632  Last data filed at 9/10/2021 0542  Gross per 24 hour   Intake 360 ml   Output --   Net 360 ml       Physical Exam Performed:    BP (!) 94/56   Pulse 90   Temp 97.8 °F (36.6 °C)   Resp 18   Wt 261 lb 11 oz (118.7 kg)   SpO2 95%   BMI 40.99 kg/m²     General appearance:  No apparent distress, appears stated age and cooperative. HEENT:  Normal cephalic, atraumatic without obvious deformity. Pupils equal, round, and reactive to light. Extra ocular muscles intact. Conjunctivae/corneas clear. Neck: Supple, with full range of motion. No jugular venous distention. Trachea midline. Respiratory:  Normal respiratory effort. Clear to auscultation, bilaterally without Rales/Wheezes/Rhonchi. Cardiovascular:  Regular rate and rhythm with normal S1/S2 with murmurs, no rubs or gallops. Abdomen: Soft, non-tender, non-distended with normal bowel sounds. Musculoskeletal:  No clubbing, cyanosis or edema bilaterally. Full range of motion without deformity. Skin: Skin color, texture, turgor normal.   wounds over the right leg. Neurologic: No focal weakness over extremities diminished sensation over toes and fingers, tingling sensation over the left arm. ,  Slurred speech /poor expression  psychiatric:  Alert and oriented, thought content appropriate,   Capillary Refill: Brisk,3 seconds, normal  Peripheral Pulses: +2 palpable, equal bilaterally     Labs:   Recent Labs     09/08/21  0540   WBC 9.2   HGB 9.2*   HCT 27.4*        Recent Labs     09/08/21  0540   *   K 4.6   CL 95*   CO2 20*   BUN 57*   CREATININE 7.5*   CALCIUM 8.7   PHOS 7.8*     No results for input(s): AST, ALT, BILIDIR, BILITOT, ALKPHOS in the last 72 hours. No results for input(s): [place] in the last 72 hours. Urinalysis:      Lab Results   Component Value Date    NITRU Negative 09/07/2021    WBCUA 10-20 09/07/2021    BACTERIA 1+ 09/07/2021    RBCUA 3-4 09/07/2021    BLOODU TRACE-LYSED 09/07/2021    SPECGRAV 1.015 09/07/2021    GLUCOSEU 100 09/07/2021       Radiology:  MRI BRAIN WO CONTRAST   Final Result   Small acute infarction in the right cerebellar hemisphere. Small old infarctions in the left cerebellar hemisphere.   Old lacunar   infarcts in the right cerebellar hemisphere, left midbrain and right thalamus. Mild parenchymal volume loss. Mild chronic microvascular disease. VL DUP CAROTID BILATERAL   Final Result      CT HEAD WO CONTRAST   Final Result   No acute intracranial abnormality. XR CHEST PORTABLE   Final Result   No radiographic evidence of acute pulmonary disease. Assessment/Plan:    Active Hospital Problems    Diagnosis     Speech problem [R47.9]     Left face and left arm tingling [R20.2]     Urinary tract infection with hematuria [N39.0, R31.9]     Acute cerebrovascular accident (CVA) ([address] [I63.9]        Dysarthria with associated left arm paresthesia due to acute CVA:  - CT head showed no acute abnormality. CTA head and neck showed less than 50% stenosis of bilateral ICAs, final read is pending.    - MRI brain showed small acute infarct in the right cerebellar hemisphere.   - Obtain ECHO with bubble study.   - Monitor pt on telemetry.   - PT/OT/SLP evaluations.   - Neurology consulted/following -- appreciate. Recommending loop recorder to evaluate for afib, defer to his primary cardiologist.   - Continue aspirin and statin. - Needs secondary stroke prevention (DM2, HTN, HLD, tobacco abuse, ZAINAB, obesity). DM2:  - Poorly controlled, A1C 9.9.  - Continue basal bolus insulin regimen -- monitor and adjust as needed. - Carb-control diet.      ESRD on HD M/W/F:  - Nephrology consulted for HD management -- appreciate.      Chronic troponin elevation:  - EKG non-acute, no complaints of angina.   - Stable and consistent with his baseline.   - Likely secondary to reduced renal clearance in setting of ESRD. HTN:  - Poorly controlled on admission, improved now. - Continue home losartan -- reduce dose to 25 mg due to soft BPs. Holding cardizem / hydralazine due to soft BPs. CAD, AS s/p TAVR:  - Continue aspirin, statin and imdur. HLD:  - Continue statin.  LDL 77.     Urinary complaints and mildly abnormal UA:  - Urine culture showed no growth. Dc'd IV rocephin.      ZAINAB:  - CPAP QHS and during the day with naps.      COPD:  - Stable, continue home inhalers. Anemia:  - Secondary to ESRD. On epoetin. Stable H&H. No overt signs of bleeding. Monitor.      Chronic leg wounds:  - Wound care consulted.      Tobacco abuse:  - Counseled on cessation. Obesity:  - With Body mass index is 40.99 kg/m². Complicating assessment and treatment. Placing patient at risk for multiple co-morbidities as well as early death and contributing to the patient's presentation. Counseled on weight loss. DVT Prophylaxis: SQ heparin   Diet: ADULT DIET;  Regular; 4 carb choices (60 gm/meal)  Code Status: Full Code    PT/OT Eval Status: ordered     Dispo - likely home tomorrow 9/11    [address] [place] [address], APRN - CNP

## 2021-09-10 NOTE — PROGRESS NOTES
[facility] [employer]  Nephrology Follow up Note           Reason for Consult:  ESRD  Requesting Physician:  Dr. Karilyn Babinski history  Seen and examined at HD w UFG of 4.3l     HD on 9/8 w 4.3 l UF, post wt 118 kg     ROS: BP stable  PSFH: No visitor    Scheduled Meds:   pravastatin  40 mg Oral Nightly    gabapentin  100 mg Oral TID    epoetin siddharth-epbx  1,600 Units IntraVENous Once per day on Mon Wed Fri    doxercalciferol  3 mcg IntraVENous Q MWF    aspirin  81 mg Oral Daily    insulin glargine  45 Units SubCUTAneous Nightly    citalopram  40 mg Oral Daily    DULoxetine  30 mg Oral Daily    [Held by provider] hydrALAZINE  25 mg Oral 3 times per day    isosorbide mononitrate  30 mg Oral Daily    linaclotide  145 mcg Oral QAM AC    losartan  50 mg Oral Daily    pantoprazole  40 mg Oral QAM AC    QUEtiapine  50 mg Oral Nightly    tiotropium-olodaterol  2 puff Inhalation Daily    torsemide  100 mg Oral Daily    traZODone  150 mg Oral Nightly    sodium chloride flush  5-40 mL IntraVENous 2 times per day    heparin (porcine)  5,000 Units SubCUTAneous 3 times per day    insulin lispro  0-6 Units SubCUTAneous TID WC    insulin lispro  0-3 Units SubCUTAneous Nightly     Continuous Infusions:   sodium chloride       PRN Meds:.cyclobenzaprine, perflutren lipid microspheres, oxyCODONE-acetaminophen, albumin human, perflutren lipid microspheres, heparin (porcine), albuterol sulfate HFA, ipratropium-albuterol, sodium chloride flush, sodium chloride, ondansetron **OR** ondansetron, polyethylene glycol    History of Present Illness on 9/8/21:    46 y.o. yo male with PMH of ESRD, HTN, CHFrEF, CAD, DM, COPD who is admitted for possible CVA  Pt noticed the symptoms of slurred speech and left arm tingling on Monday at HD , was asked to go to ED which he didn't .  As the symptoms persisted he came to ED on 9/7 and has been admitted  CT scan of head was wo acute abnormality    Physical exam: Constitutional:  VITALS:  BP (!) 99/33   Pulse 77   Temp 97.5 °F (36.4 °C)   Resp 18   Wt 269 lb 13.5 oz (122.4 kg)   SpO2 95%   BMI 42.26 kg/m²   Gen: alert, awake, nad  HEENT: pupils reactive  Neck: no bruits or jvd noted  Cardiovascular:  S1, S2 without m/r/g; trace lower extremity edema; wounds over the right leg  Respiratory: CTA B without w/r/r; respiratory effort normal  Abdomen:  +bs, soft, nt, nd, no hepatosplenomegaly  Neuro/Psy: AAoriented times 3 ; moves all 4 ext    Data/  Recent Labs     09/08/21  0540   WBC 9.2   HGB 9.2*   HCT 27.4*   MCV 90.0        Recent Labs     09/08/21  0540   *   K 4.6   CL 95*   CO2 20*   GLUCOSE 154*   PHOS 7.8*   BUN 57*   CREATININE 7.5*   LABGLOM 8*   GFRAA 9*     Impression:     Small acute infarction in the right cerebellar hemisphere. Small old infarctions in the left cerebellar hemisphere.  Old lacunar   infarcts in the right cerebellar hemisphere, left midbrain and right thalamus. Mild parenchymal volume loss. Mild chronic microvascular disease. Assessment  -ESRD on HD MWF  -HTN   -Anemia  -CKD/MBD w elevated phos  -hyponatremia  -Metabolic acidosis  -Left UE tingling n slurred speech  From  CVA on the R cerebellar hemisphere    Plan  -HD per [facility] schedule  -renal dose meds    Thank you for the consultation. Please do not hesitate to call with questions. Nelda Peabody, MD  Office: [phone]  Fax:    [phone] [address] [facility]

## 2021-09-10 NOTE — PROGRESS NOTES
Perfect serve to Dr. Alysa Hammond at 2566:    \"Pt here for CVA. Pt takes 10mg Flexeril at home q8 PRN. He is wondering if he can get this? \"    Waiting for response

## 2021-09-10 NOTE — PROGRESS NOTES
Pt completed 3.5 hours of hemodialysis and removed 4 liters of fluid net. Catheter functioned well at 400ml/min blood flow rate and dwelled with heparin post treatment.         09/10/21 1055 09/10/21 1500   Vital Signs   Temp 97.5 °F (36.4 °C) 97.8 °F (36.6 °C)   Pulse 77 90   Resp 18 18   BP (!) 99/33 (!) 94/56   Height and Weight   Weight 269 lb 13.5 oz (122.4 kg) 261 lb 11 oz (118.7 kg)   Weight Method Standing scale Standing scale Pt is a 61 F with a PMH of HTN, COPD, Lung CA s/p Right UL lobectomy with chemo, HCV with liver cirrhosis, Left MCA aneurusym on CTA 5/2019 s/p web embolization on 6/21/19 that presented with Left frontal HA, slurred speech, and right hand weakness. Pt outside of tPA window.

## 2021-09-10 NOTE — PROGRESS NOTES
Ivon Collado  Neurology Follow-up  [facility] Neurology    Date of Service: 9/10/2021    Subjective:   CC: Follow up today regarding: Slurred speech and left arm tingling    Events noted. Chart and lab reviewed. No new complaints. HD today. ROS : A 10-12 system review obtained and updated today and is unremarkable except as mentioned  in my interval history. family history includes Alcohol Abuse in his brother; Cancer in his sister and sister; Diabetes in his mother; Heart Disease in his father, sister, and sister; Kidney Disease in his sister; Obesity in his sister and sister.     Past Medical History:   Diagnosis Date    Ambulatory dysfunction     walker for long distances, SOB with distance    Aortic stenosis     echo 2017    Arthritis     hands and hips    Asthma     Bilateral hilar adenopathy syndrome 6/3/2013    CAD (coronary artery disease)     Dr. Adrian Olivia [facility]) 04/19/2019    EF= 43%    CHF (congestive heart failure) ([facility])     Chronic pain     COPD (chronic obstructive pulmonary disease) ([address]     pulmonology Dr. Baldo Bear    Depression     Diabetes mellitus ([address]     borderline    Difficult intravenous access     Emphysema of lung ([address]     ESRD (end stage renal disease) on dialysis ([address]     MWF    Fear of needles     Gastric ulcer     GERD (gastroesophageal reflux disease)     Heart valve problem     bicuspic valve    Hemodialysis patient ([address]     History of spinal fracture     work incident    Hx of blood clots     Bilateral lower extremities; stents in place    Hyperlipidemia     Hypertension     MI (myocardial infarction) ([address] 2019    has had 9 MIs. 2019 was the last    Neuromuscular disorder ([address]     due to CVA    Numbness and tingling in left arm     from fistula    Pneumonia     PONV (postoperative nausea and vomiting)     Prolonged emergence from general anesthesia     States requires more medication than most people  Sleep apnea     Uses CPAP    Stroke ([facility]     7mm thalamic cva 2017 deficts left side, left side weakness    TIA (transient ischemic attack)     Unspecified diseases of blood and blood-forming organs      Current Facility-Administered Medications   Medication Dose Route Frequency Provider Last Rate Last Admin    cyclobenzaprine (FLEXERIL) tablet 10 mg  10 mg Oral Q8H PRN Chani Barrientos MD   10 mg at 09/10/21 0656    perflutren lipid microspheres (DEFINITY) injection 1.65 mg  1.5 mL IntraVENous ONCE PRN JAY Dominique - CNP        oxyCODONE-acetaminophen (PERCOCET) 7.5-325 MG per tablet 1 tablet  1 tablet Oral Q4H PRN ClaryJAY Aguilera CNP   1 tablet at 09/10/21 0541    pravastatin (PRAVACHOL) tablet 40 mg  40 mg Oral Nightly JAY Benson CNP   40 mg at 09/09/21 2109    gabapentin (NEURONTIN) capsule 100 mg  100 mg Oral TID JAY Benson CNP   100 mg at 09/10/21 0851    albumin human 25 % IV solution 12.5 g  12.5 g IntraVENous PRN Marycarmen Trejo MD   Stopped at 09/08/21 1453    perflutren lipid microspheres (DEFINITY) injection 1.65 mg  1.5 mL IntraVENous ONCE PRN JAY Dominique CNP        epoetin siddharth-epbx (RETACRIT) injection 1,600 Units  1,600 Units IntraVENous Once per day on Mon Wed Fri Marycarmen Trejo MD   1,600 Units at 09/10/21 1432    doxercalciferol (HECTOROL) injection 3 mcg  3 mcg IntraVENous Q MWF Marycarmen Trejo MD        heparin (porcine) injection 4,000 Units  4,000 Units IntraCATHeter PRN Marycarmen Trejo MD   4,000 Units at 09/10/21 1432    albuterol sulfate  (90 Base) MCG/ACT inhaler 2 puff  2 puff Inhalation Q6H PRN Silva Cruz MD        aspirin chewable tablet 81 mg  81 mg Oral Daily Silva Cruz MD   81 mg at 09/10/21 0851    insulin glargine (LANTUS) injection vial 45 Units  45 Units SubCUTAneous Nightly Silva Cruz MD   45 Units at 09/09/21 2104    citalopram (CELEXA) tablet 40 mg  40 mg Oral Daily Adarsh Mederos MD   40 mg at 09/10/21 0850    DULoxetine (CYMBALTA) extended release capsule 30 mg  30 mg Oral Daily Adarsh Mederos MD   30 mg at 09/10/21 0851    [Held by provider] hydrALAZINE (APRESOLINE) tablet 25 mg  25 mg Oral 3 times per day Adarsh Mederos MD        ipratropium-albuterol (DUONEB) nebulizer solution 3 mL  1 vial Inhalation Q6H PRN Adarhs Mederos MD        isosorbide mononitrate (IMDUR) extended release tablet 30 mg  30 mg Oral Daily Adarsh Mederos MD   30 mg at 09/10/21 0851    linaclotide (LINZESS) capsule 145 mcg  145 mcg Oral QAM AC Adarsh Mederos MD        losartan (COZAAR) tablet 50 mg  50 mg Oral Daily Adarsh Mederos MD   50 mg at 09/10/21 0851    pantoprazole (PROTONIX) tablet 40 mg  40 mg Oral QAM AC Adarsh Mederos MD   40 mg at 09/10/21 0541    QUEtiapine (SEROQUEL) tablet 50 mg  50 mg Oral Nightly Adarsh Mederos MD   50 mg at 09/09/21 2110    tiotropium-olodaterol (STIOLTO) 2.5-2.5 MCG/ACT inhaler 2 puff  2 puff Inhalation Daily Adarsh Mederos MD   2 puff at 09/10/21 0912    torsemide (DEMADEX) tablet 100 mg  100 mg Oral Daily Adarsh Mederos MD   100 mg at 09/10/21 0850    traZODone (DESYREL) tablet 150 mg  150 mg Oral Nightly Adarsh Mederos MD   150 mg at 09/09/21 2110    sodium chloride flush 0.9 % injection 5-40 mL  5-40 mL IntraVENous 2 times per day Adarsh Mederos MD   10 mL at 09/10/21 0851    sodium chloride flush 0.9 % injection 5-40 mL  5-40 mL IntraVENous PRN Adarsh Mederos MD        0.9 % sodium chloride infusion  25 mL IntraVENous PRN Adarsh Mederos MD        ondansetron (ZOFRAN-ODT) disintegrating tablet 4 mg  4 mg Oral Q8H PRN Adarsh Mederos MD        Or    ondansetron (ZOFRAN) injection 4 mg  4 mg IntraVENous Q6H PRN Adarsh Mederos MD        polyethylene glycol (GLYCOLAX) packet 17 g  17 g Oral Daily PRN Adarsh Mederos MD        heparin (porcine) injection 5,000 Units  5,000 Units SubCUTAneous 3 times per day Jose Angel Real MD   5,000 Units at 09/10/21 0541    insulin lispro (HUMALOG) injection vial 0-6 Units  0-6 Units SubCUTAneous TID WC Jose Angel Real MD   2 Units at 09/08/21 1732    insulin lispro (HUMALOG) injection vial 0-3 Units  0-3 Units SubCUTAneous Nightly Jose Angel Real MD   1 Units at 09/09/21 2109     Allergies   Allergen Reactions    Morphine Nausea And Vomiting      reports that he has been smoking cigarettes. He has a 16.50 pack-year smoking history. He has never used smokeless tobacco. He reports previous alcohol use. He reports that he does not use drugs. Objective:  Exam:   Constitutional:   Vitals:    09/10/21 0830 09/10/21 0912 09/10/21 1055 09/10/21 1500   BP: (!) 172/67  (!) 99/33 (!) 94/56   Pulse: 82  77 90   Resp: 18 16 18 18   Temp: 97.5 °F (36.4 °C)  97.5 °F (36.4 °C) 97.8 °F (36.6 °C)   TempSrc: Oral      SpO2: 96% 95%     Weight:   269 lb 13.5 oz (122.4 kg) 261 lb 11 oz (118.7 kg)     General appearance:  Normal development and appear in no acute distress. Mental Status:   Oriented to person, place, problem, and time. Memory: Good immediate recall. Intact remote memory  Normal attention span and concentration. Language: mild dysarthria. Good fund of Knowledge. Cranial Nerves:   II: Visual fields: Full. Pupils: equal, round, reactive to light  III,IV,VI: Extra Ocular Movements are intact. No nystagmus  V: Facial sensation is intact  VII: Facial strength and movements: intact and symmetric  IX: Palate elevation is symmetric  XI: Shoulder shrug is intact  XII: Tongue movements are normal  Musculoskeletal:  4/5 LUE and LLE. Tone: Normal tone. Coordination: no pronator drift, no dysmetria with FNF  Sensation: numbness/tingling bilateral feet, chronic from neuropathy. Gait/Posture: did not test gait.           Data:  LABS:   Lab Results   Component Value Date     09/08/2021    K 4.6 09/08/2021    K 4.9 08/29/2021    CL 95 09/08/2021    CO2 20 09/08/2021    BUN 57 09/08/2021    CREATININE 7.5 09/08/2021    GFRAA 9 09/08/2021    GFRAA >60 05/17/2013    LABGLOM 8 09/08/2021    GLUCOSE 154 09/08/2021    PHOS 7.8 09/08/2021    MG 2.00 09/03/2021    CALCIUM 8.7 09/08/2021     Lab Results   Component Value Date    WBC 9.2 09/08/2021    RBC 3.04 09/08/2021    HGB 9.2 09/08/2021    HCT 27.4 09/08/2021    MCV 90.0 09/08/2021    RDW 16.1 09/08/2021     09/08/2021     Lab Results   Component Value Date    INR 0.95 07/27/2021    PROTIME 10.7 07/27/2021       Neuroimaging was independently reviewed by me and discussed results with the patient  I reviewed blood testing and other test results and discussed results with the patient      Impression:    Acute slurred speech and left arm numbness/tingling - due to acute, right cerebellar ischemic CVA in the setting of uncontrolled diabetes, tobacco abuse, and HTN. ESRD on HD  HTN, controlled. CAD  DM2 with neuropathy, uncontrolled. HLD  Obesity  ZAINAB  Tobacco dependence       Recommendation    ECHO with bubble study still pending. Monitor on tele. Continue ASA, statin. Continue home BP meds. Avoid hypotension. . Recent A1c 10. Continue home Lantus. Add SSI. Improve glycemic control. Continue home gabapentin and SSRI. Body mass index is 72.32 kg/m². Complicating assessment and treatment. Placing patient at risk for multiple co-morbidities as well as early death and contributing to the patient's presentation. Counseled on weight loss. Nephrology managing HD. Continue CPAP nightly. PT/OT/SLP  Smoking cessation encouraged. Discussed secondary stroke prevention with patient. Recommend possible implanted loop recorder with primary cardiologist to rule out PAF. May be discharged from a neurological perspective if ECHO unremarkable and medically stable. Rl Huffman CNP      This dictation was generated by voice recognition computer software.  Although all attempts are made to edit the dictation for accuracy, there may be errors in the transcription that are not intended.

## 2021-09-11 LAB
BLOOD CULTURE, ROUTINE: NORMAL
CULTURE, BLOOD 2: NORMAL
GLUCOSE BLD-MCNC: 125 MG/DL (ref 70–99)
GLUCOSE BLD-MCNC: 172 MG/DL (ref 70–99)
GLUCOSE BLD-MCNC: 175 MG/DL (ref 70–99)
GLUCOSE BLD-MCNC: 190 MG/DL (ref 70–99)
LV EF: 43 %
LVEF MODALITY: NORMAL
PERFORMED ON: ABNORMAL

## 2021-09-11 PROCEDURE — 1200000000 HC SEMI PRIVATE

## 2021-09-11 PROCEDURE — 6370000000 HC RX 637 (ALT 250 FOR IP): Performed by: INTERNAL MEDICINE

## 2021-09-11 PROCEDURE — 99232 SBSQ HOSP IP/OBS MODERATE 35: CPT | Performed by: NURSE PRACTITIONER

## 2021-09-11 PROCEDURE — 6370000000 HC RX 637 (ALT 250 FOR IP): Performed by: REGISTERED NURSE

## 2021-09-11 PROCEDURE — 2580000003 HC RX 258: Performed by: INTERNAL MEDICINE

## 2021-09-11 PROCEDURE — 6370000000 HC RX 637 (ALT 250 FOR IP): Performed by: NURSE PRACTITIONER

## 2021-09-11 PROCEDURE — C8929 TTE W OR WO FOL WCON,DOPPLER: HCPCS

## 2021-09-11 PROCEDURE — 6360000002 HC RX W HCPCS: Performed by: INTERNAL MEDICINE

## 2021-09-11 RX ADMIN — TORSEMIDE 100 MG: 100 TABLET ORAL at 09:21

## 2021-09-11 RX ADMIN — GABAPENTIN 100 MG: 100 CAPSULE ORAL at 21:15

## 2021-09-11 RX ADMIN — PRAVASTATIN SODIUM 40 MG: 40 TABLET ORAL at 21:15

## 2021-09-11 RX ADMIN — INSULIN GLARGINE 45 UNITS: 100 INJECTION, SOLUTION SUBCUTANEOUS at 21:15

## 2021-09-11 RX ADMIN — QUETIAPINE FUMARATE 50 MG: 25 TABLET ORAL at 21:15

## 2021-09-11 RX ADMIN — HEPARIN SODIUM 5000 UNITS: 5000 INJECTION INTRAVENOUS; SUBCUTANEOUS at 09:20

## 2021-09-11 RX ADMIN — SODIUM CHLORIDE, PRESERVATIVE FREE 10 ML: 5 INJECTION INTRAVENOUS at 09:21

## 2021-09-11 RX ADMIN — ISOSORBIDE MONONITRATE 30 MG: 30 TABLET, EXTENDED RELEASE ORAL at 09:21

## 2021-09-11 RX ADMIN — GABAPENTIN 100 MG: 100 CAPSULE ORAL at 09:21

## 2021-09-11 RX ADMIN — SODIUM CHLORIDE, PRESERVATIVE FREE 10 ML: 5 INJECTION INTRAVENOUS at 21:22

## 2021-09-11 RX ADMIN — TRAZODONE HYDROCHLORIDE 150 MG: 50 TABLET ORAL at 21:15

## 2021-09-11 RX ADMIN — HEPARIN SODIUM 5000 UNITS: 5000 INJECTION INTRAVENOUS; SUBCUTANEOUS at 16:47

## 2021-09-11 RX ADMIN — PANTOPRAZOLE SODIUM 40 MG: 40 TABLET, DELAYED RELEASE ORAL at 06:24

## 2021-09-11 RX ADMIN — CITALOPRAM HYDROBROMIDE 40 MG: 20 TABLET ORAL at 09:21

## 2021-09-11 RX ADMIN — LOSARTAN POTASSIUM 25 MG: 25 TABLET, FILM COATED ORAL at 09:21

## 2021-09-11 RX ADMIN — ASPIRIN 81 MG: 81 TABLET, CHEWABLE ORAL at 09:21

## 2021-09-11 RX ADMIN — DULOXETINE HYDROCHLORIDE 30 MG: 30 CAPSULE, DELAYED RELEASE ORAL at 09:21

## 2021-09-11 RX ADMIN — OXYCODONE AND ACETAMINOPHEN 1 TABLET: 7.5; 325 TABLET ORAL at 06:24

## 2021-09-11 RX ADMIN — HEPARIN SODIUM 5000 UNITS: 5000 INJECTION INTRAVENOUS; SUBCUTANEOUS at 02:59

## 2021-09-11 ASSESSMENT — PAIN SCALES - WONG BAKER
WONGBAKER_NUMERICALRESPONSE: 0

## 2021-09-11 ASSESSMENT — PAIN SCALES - GENERAL
PAINLEVEL_OUTOF10: 0
PAINLEVEL_OUTOF10: 9

## 2021-09-11 NOTE — PROGRESS NOTES
[facility] [employer]  Nephrology Follow up Note           Reason for Consult:  ESRD  Requesting Physician:  Dr. Zo Ferrera history  Resting  HD  On 9/10 w 4l U, post wt 118.7 kg    HD on 9/8 w 4.3 l UF, post wt 118 kg     ROS: BP stable  PSFH: No visitor    Scheduled Meds:   losartan  25 mg Oral Daily    pravastatin  40 mg Oral Nightly    gabapentin  100 mg Oral TID    epoetin siddharth-epbx  1,600 Units IntraVENous Once per day on Mon Wed Fri    doxercalciferol  3 mcg IntraVENous Q MWF    aspirin  81 mg Oral Daily    insulin glargine  45 Units SubCUTAneous Nightly    citalopram  40 mg Oral Daily    DULoxetine  30 mg Oral Daily    [Held by provider] hydrALAZINE  25 mg Oral 3 times per day    isosorbide mononitrate  30 mg Oral Daily    linaclotide  145 mcg Oral QAM AC    pantoprazole  40 mg Oral QAM AC    QUEtiapine  50 mg Oral Nightly    tiotropium-olodaterol  2 puff Inhalation Daily    torsemide  100 mg Oral Daily    traZODone  150 mg Oral Nightly    sodium chloride flush  5-40 mL IntraVENous 2 times per day    heparin (porcine)  5,000 Units SubCUTAneous 3 times per day    insulin lispro  0-6 Units SubCUTAneous TID WC    insulin lispro  0-3 Units SubCUTAneous Nightly     Continuous Infusions:   sodium chloride       PRN Meds:.cyclobenzaprine, perflutren lipid microspheres, oxyCODONE-acetaminophen, albumin human, perflutren lipid microspheres, heparin (porcine), albuterol sulfate HFA, ipratropium-albuterol, sodium chloride flush, sodium chloride, ondansetron **OR** ondansetron, polyethylene glycol    History of Present Illness on 9/8/21:    46 y.o. yo male with PMH of ESRD, HTN, CHFrEF, CAD, DM, COPD who is admitted for possible CVA  Pt noticed the symptoms of slurred speech and left arm tingling on Monday at HD , was asked to go to ED which he didn't .  As the symptoms persisted he came to ED on 9/7 and has been admitted  CT scan of head was wo acute abnormality    Physical exam:

## 2021-09-11 NOTE — PROGRESS NOTES
Caryn Christy  Neurology Follow-up  [facility] Neurology    Date of Service: 9/11/2021    Subjective:   CC: Follow up today regarding: Slurred speech and left arm tingling    Events noted. Chart and lab reviewed. Dysarthria continues to wax and wane. ROS : A 10-12 system review obtained and updated today and is unremarkable except as mentioned  in my interval history. family history includes Alcohol Abuse in his brother; Cancer in his sister and sister; Diabetes in his mother; Heart Disease in his father, sister, and sister; Kidney Disease in his sister; Obesity in his sister and sister.     Past Medical History:   Diagnosis Date    Ambulatory dysfunction     walker for long distances, SOB with distance    Aortic stenosis     echo 2017    Arthritis     hands and hips    Asthma     Bilateral hilar adenopathy syndrome 6/3/2013    CAD (coronary artery disease)     Dr. Paulette Guerrier [facility]) 04/19/2019    EF= 43%    CHF (congestive heart failure) (HCC)     Chronic pain     COPD (chronic obstructive pulmonary disease) ([address]     pulmonology Dr. Anamika Elena    Depression     Diabetes mellitus ([address]     borderline    Difficult intravenous access     Emphysema of lung ([address]     ESRD (end stage renal disease) on dialysis ([address]     MWF    Fear of needles     Gastric ulcer     GERD (gastroesophageal reflux disease)     Heart valve problem     bicuspic valve    Hemodialysis patient ([address]     History of spinal fracture     work incident    Hx of blood clots     Bilateral lower extremities; stents in place    Hyperlipidemia     Hypertension     MI (myocardial infarction) ([address] 2019    has had 9 MIs. 2019 was the last    Neuromuscular disorder ([address]     due to CVA    Numbness and tingling in left arm     from fistula    Pneumonia     PONV (postoperative nausea and vomiting)     Prolonged emergence from general anesthesia     States requires more medication than most people    Sleep apnea     Uses CPAP    [facility]     7mm thalamic cva 2017 deficts left side, left side weakness    TIA (transient ischemic attack)     Unspecified diseases of blood and blood-forming organs      Current Facility-Administered Medications   Medication Dose Route Frequency Provider Last Rate Last Admin    cyclobenzaprine (FLEXERIL) tablet 10 mg  10 mg Oral Q8H PRN Janusz Carreon MD   10 mg at 09/10/21 1958    losartan (COZAAR) tablet 25 mg  25 mg Oral Daily [address] Shailesh APRN - CNP   25 mg at 09/11/21 8969    perflutren lipid microspheres (DEFINITY) injection 1.65 mg  1.5 mL IntraVENous ONCE PRN Ritu Weldon APRN - CNP        oxyCODONE-acetaminophen (PERCOCET) 7.5-325 MG per tablet 1 tablet  1 tablet Oral Q4H PRN Nikky Díaz APRN - CNP   1 tablet at 09/11/21 8315    pravastatin (PRAVACHOL) tablet 40 mg  40 mg Oral Nightly Ioana Praneeth APRN - CNP   40 mg at 09/10/21 2145    gabapentin (NEURONTIN) capsule 100 mg  100 mg Oral TID Ioana Praneeth APRN - CNP   100 mg at 09/11/21 0921    albumin human 25 % IV solution 12.5 g  12.5 g IntraVENous PRN Maryanne Briscoe MD   Stopped at 09/08/21 1453    perflutren lipid microspheres (DEFINITY) injection 1.65 mg  1.5 mL IntraVENous ONCE PRN Ritu Weldon APRN - CNP        epoetin siddharth-epbx (RETACRIT) injection 1,600 Units  1,600 Units IntraVENous Once per day on Mon Wed Fri Maryanne Briscoe MD   1,600 Units at 09/10/21 1432    doxercalciferol (HECTOROL) injection 3 mcg  3 mcg IntraVENous Q MWF Maryanne Briscoe MD        heparin (porcine) injection 4,000 Units  4,000 Units IntraCATHeter PRN Maryanne Briscoe MD   4,000 Units at 09/10/21 1432    albuterol sulfate  (90 Base) MCG/ACT inhaler 2 puff  2 puff Inhalation Q6H PRN Willard Hawk MD        aspirin chewable tablet 81 mg  81 mg Oral Daily Willard Hawk MD   81 mg at 09/11/21 0921    insulin glargine (LANTUS) injection vial 45 Units  45 Units SubCUTAneous Nightly Jose Angel Real MD   45 Units at 09/10/21 2146    citalopram (CELEXA) tablet 40 mg  40 mg Oral Daily Jose nAgel Real MD   40 mg at 09/11/21 0921    DULoxetine (CYMBALTA) extended release capsule 30 mg  30 mg Oral Daily Jose Angel Real MD   30 mg at 09/11/21 1033    [Held by provider] hydrALAZINE (APRESOLINE) tablet 25 mg  25 mg Oral 3 times per day Jose Angel Real MD        ipratropium-albuterol (DUONEB) nebulizer solution 3 mL  1 vial Inhalation Q6H PRN Jose Angel Real MD        isosorbide mononitrate (IMDUR) extended release tablet 30 mg  30 mg Oral Daily Jose Angel Real MD   30 mg at 09/11/21 0921    linaclotide (LINZESS) capsule 145 mcg  145 mcg Oral QAM AC Jose Angel Real MD        pantoprazole (PROTONIX) tablet 40 mg  40 mg Oral QAM AC Jose Angel Real MD   40 mg at 09/11/21 0624    QUEtiapine (SEROQUEL) tablet 50 mg  50 mg Oral Nightly Jose Angel Real MD   50 mg at 09/10/21 2145    tiotropium-olodaterol (STIOLTO) 2.5-2.5 MCG/ACT inhaler 2 puff  2 puff Inhalation Daily Jose Angel Real MD   2 puff at 09/10/21 0912    torsemide (DEMADEX) tablet 100 mg  100 mg Oral Daily Jose Angel Real MD   100 mg at 09/11/21 0921    traZODone (DESYREL) tablet 150 mg  150 mg Oral Nightly Jose Angel Real MD   150 mg at 09/10/21 2144    sodium chloride flush 0.9 % injection 5-40 mL  5-40 mL IntraVENous 2 times per day Jose Angel Real MD   10 mL at 09/11/21 0921    sodium chloride flush 0.9 % injection 5-40 mL  5-40 mL IntraVENous PRN Jose Angel Real MD        0.9 % sodium chloride infusion  25 mL IntraVENous PRN Jose Angel Real MD        ondansetron (ZOFRAN-ODT) disintegrating tablet 4 mg  4 mg Oral Q8H PRN Jose Angel Real MD        Or    ondansetron (ZOFRAN) injection 4 mg  4 mg IntraVENous Q6H PRN Jose Angel Real MD        polyethylene glycol (GLYCOLAX) packet 17 g  17 g Oral Daily PRN Jose Angel Real MD        heparin (porcine) injection 5,000 Units 5,000 Units SubCUTAneous 3 times per day Akira Swartz MD   5,000 Units at 09/11/21 0920    insulin lispro (HUMALOG) injection vial 0-6 Units  0-6 Units SubCUTAneous TID WC Akira Swartz MD   1 Units at 09/10/21 1732    insulin lispro (HUMALOG) injection vial 0-3 Units  0-3 Units SubCUTAneous Nightly Akira Swartz MD   2 Units at 09/10/21 2146     Allergies   Allergen Reactions    Morphine Nausea And Vomiting      reports that he has been smoking cigarettes. He has a 16.50 pack-year smoking history. He has never used smokeless tobacco. He reports previous alcohol use. He reports that he does not use drugs. Objective:  Exam:   Constitutional:   Vitals:    09/10/21 1500 09/10/21 2002 09/10/21 2334 09/11/21 0920   BP: (!) 94/56 (!) 103/57  (!) 141/85   Pulse: 90 98  87   Resp: 18 18 18 18   Temp: 97.8 °F (36.6 °C) 97.6 °F (36.4 °C)  97.8 °F (36.6 °C)   TempSrc:  Oral  Oral   SpO2:  96%  94%   Weight: 261 lb 11 oz (118.7 kg)        General appearance:  Normal development and appear in no acute distress. Mental Status:   Oriented to person, place, problem, and time. Memory: Good immediate recall. Intact remote memory  Normal attention span and concentration. Language: mild dysarthria. Good fund of Knowledge. Cranial Nerves:   II: Visual fields: Full. Pupils: equal, round, reactive to light  III,IV,VI: Extra Ocular Movements are intact. No nystagmus  V: Facial sensation is intact  VII: Facial strength and movements: intact and symmetric  IX: Palate elevation is symmetric  XI: Shoulder shrug is intact  XII: Tongue movements are normal  Musculoskeletal:  4/5 LUE and LLE. Tone: Normal tone. Coordination: no pronator drift, no dysmetria with FNF  Sensation: numbness/tingling bilateral feet, chronic from neuropathy. Gait/Posture: did not test gait.           Data:  LABS:   Lab Results   Component Value Date     09/08/2021    K 4.6 09/08/2021    K 4.9 08/29/2021    CL 95 09/08/2021 CO2 20 09/08/2021    BUN 57 09/08/2021    CREATININE 7.5 09/08/2021    GFRAA 9 09/08/2021    GFRAA >60 05/17/2013    LABGLOM 8 09/08/2021    GLUCOSE 154 09/08/2021    PHOS 7.8 09/08/2021    MG 2.00 09/03/2021    CALCIUM 8.7 09/08/2021     Lab Results   Component Value Date    WBC 9.2 09/08/2021    RBC 3.04 09/08/2021    HGB 9.2 09/08/2021    HCT 27.4 09/08/2021    MCV 90.0 09/08/2021    RDW 16.1 09/08/2021     09/08/2021     Lab Results   Component Value Date    INR 0.95 07/27/2021    PROTIME 10.7 07/27/2021       Neuroimaging was independently reviewed by me and discussed results with the patient  I reviewed blood testing and other test results and discussed results with the patient      Impression:    Acute slurred speech and left arm numbness/tingling - due to acute, right cerebellar ischemic CVA in the setting of uncontrolled diabetes, tobacco abuse, and HTN. ESRD on HD  HTN, controlled. CAD  DM2 with neuropathy, uncontrolled. HLD  Obesity  ZAINAB  Tobacco dependence       Recommendation    ECHO with bubble study still pending. Monitor on tele. Continue ASA, statin. Continue home BP meds. Avoid hypotension. . Recent A1c 10. Continue home Lantus. Add SSI. Improve glycemic control. Continue home gabapentin and SSRI. Body mass index is 51.13 kg/m². Complicating assessment and treatment. Placing patient at risk for multiple co-morbidities as well as early death and contributing to the patient's presentation. Counseled on weight loss. Nephrology managing HD. Continue CPAP nightly. PT/OT/SLP  Smoking cessation encouraged. Discussed secondary stroke prevention with patient. Recommend possible implanted loop recorder with primary cardiologist to rule out PAF. May be discharged from a neurological perspective if ECHO unremarkable and medically stable. Thanh Hoffamn CNP      This dictation was generated by voice recognition computer software.  Although all attempts are made to edit the dictation for accuracy, there may be errors in the transcription that are not intended.

## 2021-09-11 NOTE — PROGRESS NOTES
09/10/21 2334   NIV Type   NIV Started/Stopped On   Equipment Type v60   Mode Bilevel   Mask Type Full face mask   Mask Size Large   Settings/Measurements   IPAP 14 cmH20   CPAP/EPAP 5 cmH2O   Rate Ordered 12   Resp 18   FiO2  30 %   Vt Exhaled 951 mL   Minute Volume 12.4 Liters   Mask Leak (lpm) 55 lpm   Comfort Level Good   Using Accessory Muscles No   SpO2 96   Breath Sounds   Right Upper Lobe Diminished   Right Middle Lobe Diminished   Right Lower Lobe Diminished   Left Upper Lobe Diminished   Left Lower Lobe Diminished   Alarm Settings   Alarms On Y

## 2021-09-12 VITALS
RESPIRATION RATE: 16 BRPM | HEART RATE: 83 BPM | OXYGEN SATURATION: 94 % | BODY MASS INDEX: 40.99 KG/M2 | TEMPERATURE: 97.4 F | SYSTOLIC BLOOD PRESSURE: 149 MMHG | WEIGHT: 261.69 LBS | DIASTOLIC BLOOD PRESSURE: 71 MMHG

## 2021-09-12 LAB
GLUCOSE BLD-MCNC: 158 MG/DL (ref 70–99)
GLUCOSE BLD-MCNC: 207 MG/DL (ref 70–99)
PERFORMED ON: ABNORMAL
PERFORMED ON: ABNORMAL

## 2021-09-12 PROCEDURE — 94761 N-INVAS EAR/PLS OXIMETRY MLT: CPT

## 2021-09-12 PROCEDURE — 2580000003 HC RX 258: Performed by: INTERNAL MEDICINE

## 2021-09-12 PROCEDURE — 6370000000 HC RX 637 (ALT 250 FOR IP): Performed by: NURSE PRACTITIONER

## 2021-09-12 PROCEDURE — 94660 CPAP INITIATION&MGMT: CPT

## 2021-09-12 PROCEDURE — 99222 1ST HOSP IP/OBS MODERATE 55: CPT | Performed by: INTERNAL MEDICINE

## 2021-09-12 PROCEDURE — 6370000000 HC RX 637 (ALT 250 FOR IP): Performed by: REGISTERED NURSE

## 2021-09-12 PROCEDURE — 6360000002 HC RX W HCPCS: Performed by: INTERNAL MEDICINE

## 2021-09-12 PROCEDURE — 94640 AIRWAY INHALATION TREATMENT: CPT

## 2021-09-12 PROCEDURE — 6370000000 HC RX 637 (ALT 250 FOR IP): Performed by: INTERNAL MEDICINE

## 2021-09-12 PROCEDURE — 2700000000 HC OXYGEN THERAPY PER DAY

## 2021-09-12 RX ORDER — LOSARTAN POTASSIUM 25 MG/1
25 TABLET ORAL DAILY
Qty: 30 TABLET | Refills: 3 | Status: ON HOLD | OUTPATIENT
Start: 2021-09-13 | End: 2021-10-15 | Stop reason: HOSPADM

## 2021-09-12 RX ADMIN — PANTOPRAZOLE SODIUM 40 MG: 40 TABLET, DELAYED RELEASE ORAL at 06:52

## 2021-09-12 RX ADMIN — HEPARIN SODIUM 5000 UNITS: 5000 INJECTION INTRAVENOUS; SUBCUTANEOUS at 10:26

## 2021-09-12 RX ADMIN — DULOXETINE HYDROCHLORIDE 30 MG: 30 CAPSULE, DELAYED RELEASE ORAL at 10:26

## 2021-09-12 RX ADMIN — ISOSORBIDE MONONITRATE 30 MG: 30 TABLET, EXTENDED RELEASE ORAL at 10:26

## 2021-09-12 RX ADMIN — OXYCODONE AND ACETAMINOPHEN 1 TABLET: 7.5; 325 TABLET ORAL at 01:33

## 2021-09-12 RX ADMIN — GABAPENTIN 100 MG: 100 CAPSULE ORAL at 10:26

## 2021-09-12 RX ADMIN — TORSEMIDE 100 MG: 100 TABLET ORAL at 10:26

## 2021-09-12 RX ADMIN — TIOTROPIUM BROMIDE AND OLODATEROL 2 PUFF: 3.124; 2.736 SPRAY, METERED RESPIRATORY (INHALATION) at 08:21

## 2021-09-12 RX ADMIN — HEPARIN SODIUM 5000 UNITS: 5000 INJECTION INTRAVENOUS; SUBCUTANEOUS at 01:32

## 2021-09-12 RX ADMIN — CITALOPRAM HYDROBROMIDE 40 MG: 20 TABLET ORAL at 10:26

## 2021-09-12 RX ADMIN — OXYCODONE AND ACETAMINOPHEN 1 TABLET: 7.5; 325 TABLET ORAL at 06:52

## 2021-09-12 RX ADMIN — LOSARTAN POTASSIUM 25 MG: 25 TABLET, FILM COATED ORAL at 10:26

## 2021-09-12 RX ADMIN — ASPIRIN 81 MG: 81 TABLET, CHEWABLE ORAL at 10:26

## 2021-09-12 RX ADMIN — SODIUM CHLORIDE, PRESERVATIVE FREE 10 ML: 5 INJECTION INTRAVENOUS at 10:27

## 2021-09-12 ASSESSMENT — PAIN DESCRIPTION - ORIENTATION: ORIENTATION: LEFT;RIGHT

## 2021-09-12 ASSESSMENT — PAIN DESCRIPTION - LOCATION: LOCATION: BACK;HIP

## 2021-09-12 ASSESSMENT — PAIN SCALES - GENERAL
PAINLEVEL_OUTOF10: 5
PAINLEVEL_OUTOF10: 9

## 2021-09-12 ASSESSMENT — PAIN DESCRIPTION - PAIN TYPE: TYPE: CHRONIC PAIN

## 2021-09-12 NOTE — PROGRESS NOTES
09/12/21 0003   NIV Type   $NIV $Daily Charge   NIV Started/Stopped On   Equipment Type V60   Mode Bilevel   Mask Type Full face mask   Mask Size Large   Settings/Measurements   IPAP 14 cmH20   CPAP/EPAP 5 cmH2O   Rate Ordered 12   Resp 23   FiO2  30 %   I Time/ I Time % 1 s   Vt Exhaled 822 mL   Minute Volume 13.5 Liters   Mask Leak (lpm) 45 lpm   Comfort Level Good   Using Accessory Muscles No   SpO2 97   Alarm Settings   Alarms On Y

## 2021-09-12 NOTE — CARE COORDINATION
CASE MANAGEMENT DISCHARGE SUMMARY      Discharge to: home with [facility]    IMM given: (date)     New Durable Medical Equipment ordered/agency:     Transportation:    Family/car: family     Confirmed discharge plan with:RN, [facility]     Patient: yes     Facility/Agency, name:  [id number]   Phone number for report to facility:      RN, name: Brandon Cespedes     Note: Discharging nurse to complete [facility], reconcile [facility], and place final copy with patient's discharge packet. RN to ensure that written prescriptions for  Level II medications are sent with patient to the facility as per protocol.

## 2021-09-12 NOTE — PROGRESS NOTES
Pt's IV line removed without complications. Discussed d/c instructions with patient, given opportunity to ask questions, and provided new medication education with side effects. Follow up appointment information included in d/c instructions. Pt verbalized understanding of d/c instructions. Patient was discharged to home with all belongings and taken outside via wheelchair.     Jimi Park RN

## 2021-09-12 NOTE — PROGRESS NOTES
Hospitalist Progress Note      PCP: Morelia Hamilton MD    Date of Admission: 9/7/2021    Chief Complaint: [facility] Course: H&P reviewed     Subjective: Pt is on RA. Afebrile. VSS. +left arm numbness with intermittent slurred speech. No other specific complaints. Medications:  Reviewed    Infusion Medications    sodium chloride       Scheduled Medications    losartan  25 mg Oral Daily    pravastatin  40 mg Oral Nightly    gabapentin  100 mg Oral TID    epoetin siddharth-epbx  1,600 Units IntraVENous Once per day on Mon Wed Fri    doxercalciferol  3 mcg IntraVENous Q MWF    aspirin  81 mg Oral Daily    insulin glargine  45 Units SubCUTAneous Nightly    citalopram  40 mg Oral Daily    DULoxetine  30 mg Oral Daily    [Held by provider] hydrALAZINE  25 mg Oral 3 times per day    isosorbide mononitrate  30 mg Oral Daily    linaclotide  145 mcg Oral QAM AC    pantoprazole  40 mg Oral QAM AC    QUEtiapine  50 mg Oral Nightly    tiotropium-olodaterol  2 puff Inhalation Daily    torsemide  100 mg Oral Daily    traZODone  150 mg Oral Nightly    sodium chloride flush  5-40 mL IntraVENous 2 times per day    heparin (porcine)  5,000 Units SubCUTAneous 3 times per day    insulin lispro  0-6 Units SubCUTAneous TID WC    insulin lispro  0-3 Units SubCUTAneous Nightly     PRN Meds: cyclobenzaprine, perflutren lipid microspheres, oxyCODONE-acetaminophen, albumin human, perflutren lipid microspheres, heparin (porcine), albuterol sulfate HFA, ipratropium-albuterol, sodium chloride flush, sodium chloride, ondansetron **OR** ondansetron, polyethylene glycol    No intake or output data in the 24 hours ending 09/11/21 2020    Physical Exam Performed:    BP (!) 141/85   Pulse 87   Temp 97.8 °F (36.6 °C) (Oral)   Resp 18   Wt 261 lb 11 oz (118.7 kg)   SpO2 94%   BMI 40.99 kg/m²     General appearance:  No apparent distress, appears stated age and cooperative.   HEENT:  Normal cephalic, atraumatic without obvious deformity. Pupils equal, round, and reactive to light. Extra ocular muscles intact. Conjunctivae/corneas clear. Neck: Supple, with full range of motion. No jugular venous distention. Trachea midline. Respiratory:  Normal respiratory effort. Clear to auscultation, bilaterally without Rales/Wheezes/Rhonchi. Cardiovascular:  Regular rate and rhythm with normal S1/S2 with murmurs, no rubs or gallops. Abdomen: Soft, non-tender, non-distended with normal bowel sounds. Musculoskeletal:  No clubbing, cyanosis or edema bilaterally. Full range of motion without deformity. Skin: Skin color, texture, turgor normal.   wounds over the right leg. Neurologic: No focal weakness over extremities diminished sensation over toes and fingers, tingling sensation over the left arm. ,  Slurred speech /poor expression  psychiatric:  Alert and oriented, thought content appropriate,   Capillary Refill: Brisk,3 seconds, normal  Peripheral Pulses: +2 palpable, equal bilaterally     Labs:   No results for input(s): WBC, HGB, HCT, PLT in the last 72 hours. No results for input(s): NA, K, CL, CO2, BUN, CREATININE, CALCIUM, PHOS in the last 72 hours. Invalid input(s): MAGNES  No results for input(s): AST, ALT, BILIDIR, BILITOT, ALKPHOS in the last 72 hours. No results for input(s): William Clap in the last 72 hours. Urinalysis:      Lab Results   Component Value Date    NITRU Negative 09/07/2021    WBCUA 10-20 09/07/2021    BACTERIA 1+ 09/07/2021    RBCUA 3-4 09/07/2021    BLOODU TRACE-LYSED 09/07/2021    SPECGRAV 1.015 09/07/2021    GLUCOSEU 100 09/07/2021       Radiology:  MRI BRAIN WO CONTRAST   Final Result   Small acute infarction in the right cerebellar hemisphere. Small old infarctions in the left cerebellar hemisphere. Old lacunar   infarcts in the right cerebellar hemisphere, left midbrain and right thalamus. Mild parenchymal volume loss. Mild chronic microvascular disease. VL DUP CAROTID BILATERAL   Final Result      CT HEAD WO CONTRAST   Final Result   No acute intracranial abnormality. XR CHEST PORTABLE   Final Result   No radiographic evidence of acute pulmonary disease. Assessment/Plan:    Active Hospital Problems    Diagnosis     Speech problem [R47.9]     Left face and left arm tingling [R20.2]     Urinary tract infection with hematuria [N39.0, R31.9]     Acute cerebrovascular accident (CVA) ([address] [I63.9]        Dysarthria with associated left arm paresthesia due to acute CVA:  - CT head showed no acute abnormality. CTA head and neck showed less than 50% stenosis of bilateral ICAs. - MRI brain showed small acute infarct in the right cerebellar hemisphere. - Updated ECHO shows reduced LVEF of 40-45%, moderate global hypokinesis with regional variation, G2DD, TAVR well seated, trace AV regurg, mild MR, bubble study negative for shunting. Consult Cardiology given new drop in LVEF -- appreciate. - Monitor pt on telemetry.   - PT/OT/SLP evaluations.   - Neurology consulted/following -- appreciate. Recommending loop recorder to evaluate for afib, defer to his primary cardiologist.   - Continue aspirin and statin. - Needs secondary stroke prevention (DM2, HTN, HLD, tobacco abuse, ZAINAB, obesity). DM2:  - Poorly controlled, A1C 9.9.  - Continue basal bolus insulin regimen -- monitor and adjust as needed. - Carb-control diet.      ESRD on HD M/W/F:  - Nephrology consulted for HD management -- appreciate.      Chronic troponin elevation:  - EKG non-acute, no complaints of angina.   - Stable and consistent with his baseline.   - Likely secondary to reduced renal clearance in setting of ESRD. HTN:  - Poorly controlled on admission. Variable control now. - Continue home losartan -- reduce dose to 25 mg due to soft BPs. Holding cardizem / hydralazine due to soft BPs.     CAD, AS s/p TAVR:  - Continue aspirin, statin and imdur.  - ECHO as noted above, cardiology consulted. HLD:  - Continue statin. LDL 77.     Urinary complaints and mildly abnormal UA:  - Urine culture showed no growth. Dc'd IV rocephin.      ZAINAB:  - CPAP QHS and during the day with naps.      COPD:  - Stable, continue home inhalers. Anemia:  - Secondary to ESRD. On epoetin. Stable H&H. No overt signs of bleeding. Monitor.      Chronic leg wounds:  - Wound care consulted.      Tobacco abuse:  - Counseled on cessation. Obesity:  - With Body mass index is 40.99 kg/m². Complicating assessment and treatment. Placing patient at risk for multiple co-morbidities as well as early death and contributing to the patient's presentation. Counseled on weight loss. DVT Prophylaxis: SQ heparin   Diet: ADULT DIET;  Regular; 4 carb choices (60 gm/meal)  Code Status: Full Code    PT/OT Eval Status: ordered with recs for 24 hr supervision / assist    Dispo - pending cardiology input    JAY Mueller - CNP

## 2021-09-12 NOTE — CONSULTS
[address], [address]                                  CONSULTATION    PATIENT NAME: Rose Mary Lyons                  :        1970  MED REC NO:   [id number]                          ROOM:       6094  ACCOUNT NO:   [de-identified]                           ADMIT DATE: 2021  PROVIDER:     Osiel Bills. Germania Almonte MD    CONSULT DATE:  2021    REASON FOR CONSULTATION:  Cardiomyopathy. HISTORY OF PRESENT ILLNESS:  This is a 61-year-old male who presented to  the hospital with symptoms of slurred speech. The symptom started the  day prior to admission. He had associated left arm tingling. He never  had the symptoms before. The symptoms have decreased since then. They  were constant for at least a day or two. There did not appear to be any  precipitating factor. They have improved spontaneously. The patient  was subsequently diagnosed with an acute CVA. An echocardiogram was  obtained that showed reduced ejection fraction and for this reason,  Cardiology was consulted. PAST MEDICAL HISTORY:  1. Cardiomyopathy. He has had wide range of ejection fraction over the  years, generally running about 40% to 50%. 2.  Status post TAVR. 3.  Peripheral arterial disease including stents in his lower  extremities and carotid artery stenosis of less than 50%. 4.  Diabetes. 5.  End-stage renal disease on hemodialysis. 6.  Chronic troponin elevation. 7.  Hypertension. 8.  Hyperlipidemia. 9.  Anemia. 10.  COPD. SOCIAL HISTORY:  He smokes. FAMILY HISTORY:  Positive for heart disease. REVIEW OF SYSTEMS:  No fevers or chills. No cough. No headache or  visual changes. No recent GI or  bleeding. No recent upcoming  surgeries. No chest pain. No palpitations. No shortness of breath. No syncope. All other systems are negative except as in present  illness. ALLERGIES:  MORPHINE.     MEDICATIONS: See list in the chart, which I have reviewed. PHYSICAL EXAMINATION:  VITALS:  Blood pressure is 149/71, heart rate 83, respirations 16,  temperature 97.4. He is 94% saturating on room air. GENERAL:  A well-developed, well-nourished white male, in no acute  distress. HEENT:  Normocephalic and atraumatic. Oropharynx clear. Moist mucous  membranes. NECK:  Supple. CHEST:  Clear. CARDIAC:  Regular S1 and S2. There is no S3 or S4 gallop. There is  soft systolic murmur. Jugular venous pressure is normal.  Carotids are  2+ and symmetric without bruit. ABDOMEN:  Soft and nontender. Positive bowel sounds. EXTREMITIES:  No cyanosis or edema. NEUROLOGIC:  Grossly nonfocal.  SKIN:  Warm and dry. PSYCHIATRIC:  Affect calm. DIAGNOSTIC DATA:  Significant laboratory data includes a creatinine of  7.5, troponin of 0.15, pro-BNP of 47,000, hemoglobin of 9.2. Chest  x-ray shows no pulmonary edema. Telemetry, normal sinus rhythm. EKG,  sinus rhythm. No acute ischemia or injury pattern. CT of the head  reviewed. MRI reviewed. IMPRESSION:  1. Slurred speech due to CVA, improving. 2.  Acute CVA. 3.  Cardiomyopathy. Again, his ejection fraction is ranged from 40% to  50% over the last several years. His echocardiogram obtained on this  admission showed ejection fraction of 40% to 45%. I do not think this  represents any significant change in his overall cardiac status. 4.  Coronary artery disease status post angioplasty with stents in the  past.  5.  Diabetes. 6.  Peripheral arterial disease, including stents in his lower extremity  and carotid artery stenosis of less than 50%. 7.  Status post TAVR. 8.  End-stage renal disease on hemodialysis. 9.  Elevated troponin, chronic without other objective evidence of acute  coronary syndrome. 10.  Hypertension, suboptimal.  Will defer to Nephrology. 11.  Hyperlipidemia. 12.  Anemia. 13.  COPD. 14.  Smoking.     RECOMMENDATIONS:  1.  I do not think any further cardiac testing or change in his therapy  is warranted at this time. 2.  The patient is okay for discharge from a cardiac standpoint. 3.  We will place outpatient 30-day monitor to look for evidence of  paroxysmal atrial fibrillation as cause of the stroke. If this is  unremarkable, he should be considered for loop recorder. IVORY Lechuga MD    D: 09/12/2021 12:18:40       T: 09/12/2021 14:11:29     [email or web address]  Job#: [id number]     Doc#: [id number]    CC:

## 2021-09-12 NOTE — PROGRESS NOTES
09/12/21 0347   NIV Type   NIV Started/Stopped On   Equipment Type V60   Mode Bilevel   Mask Type Full face mask   Mask Size Large   Settings/Measurements   IPAP 14 cmH20   CPAP/EPAP 5 cmH2O   Rate Ordered 12   Resp 14   FiO2  30 %   I Time/ I Time % 1 s   Vt Exhaled 800 mL   Minute Volume 10.7 Liters   Mask Leak (lpm) 32 lpm   Comfort Level Good   Using Accessory Muscles No   SpO2 95   Alarm Settings   Alarms On Y

## 2021-09-12 NOTE — DISCHARGE SUMMARY
Hospital Medicine Discharge Summary    Patient ID: Robinson Ko      Patient's PCP: Rod Nickerson MD    Admit Date: 9/7/2021     Discharge Date: 9/12/2021      Admitting Physician: Heaven Amador MD     Discharge Physician: JAY Mueller - CNP     Discharge Diagnoses: Active Hospital Problems    Diagnosis     Speech problem [R47.9]     Left face and left arm tingling [R20.2]     Urinary tract infection with hematuria [N39.0, R31.9]     Acute cerebrovascular accident (CVA) ([address] [I63.9]        The patient was seen and examined on day of discharge and this discharge summary is in conjunction with any daily progress note from day of discharge. Hospital Course:   46 y.o. male who presented to [facility] with complaints of slurred speech and left arm tingling. He has a history of ESRD on hemodialysis Monday Wednesday Friday. While he was at dialysis yesterday the staff noticed that his speech was slurred and he complained left arm tingling as well. He was advised to go to emergency room but he declined. But his symptoms persisted and he was brought to emergency room for further evaluation. Still has slurred speech and difficulty in expression. He has good reception of speech. Still complaining some tingling over the left arm. He has diabetes and has poor sensation over both feet and fingers that is chronic. He denies chest pain shortness of breath or fever. He has some hesitancy in urination. He is a smoker and has chronic cough. Dysarthria with associated left arm paresthesia due to acute CVA:  - CT head showed no acute abnormality. CTA head and neck showed less than 50% stenosis of bilateral ICAs. - MRI brain showed small acute infarct in the right cerebellar hemisphere. - Updated ECHO shows reduced LVEF of 40-45%, moderate global hypokinesis with regional variation, G2DD, TAVR well seated, trace AV regurg, mild MR, bubble study negative for shunting.  Cardiology consulted, will arrange for 30-day cardiac event monitor as an outpt. - Pt monitored on telemetry while inpt, no arrhthymias or ectopy noted.   - PT/OT/SLP evaluations. Recs for 24 supervision / assist.   - Neurology consulted. - Continue aspirin and statin. - Needs secondary stroke prevention (DM2, HTN, HLD, tobacco abuse, ZAINAB, obesity).      DM2:  - Poorly controlled, A1C 9.9.  - Basal bolus insulin while inpt, resume his home regimen at [place]. - Carb-control diet.      ESRD on HD M/W/F:  - Nephrology consulted.      Chronic troponin elevation:  - EKG non-acute, no complaints of angina.   - Stable and consistent with his baseline.   - Likely secondary to reduced renal clearance in setting of ESRD.      HTN:  - Poorly controlled on admission. Variable control now. - Continue home losartan -- reduce dose to 25 mg due to soft BPs. Continue hydralazine. Cardizem dc'd.     CAD, AS s/p TAVR:  - Continue aspirin, statin and imdur.  - ECHO as noted above, cardiology consulted.      HLD:  - Continue statin. LDL 77.     Urinary complaints and mildly abnormal UA:  - Urine culture showed no growth. Dc'd IV rocephin.      ZAINAB:  - CPAP QHS and during the day with naps.      COPD:  - Stable, continue home inhalers.      Anemia:  - Secondary to ESRD. On epoetin. Stable H&H. No overt signs of bleeding.      Chronic leg wounds:  - Wound care consulted.      Tobacco abuse:  - Counseled on cessation.      Obesity:  - With Body mass index is 40.99 kg/m². Complicating assessment and treatment. Placing patient at risk for multiple co-morbidities as well as early death and contributing to the patient's presentation. Counseled on weight loss. Physical Exam Performed:     BP (!) 149/71   Pulse 83   Temp 97.4 °F (36.3 °C) (Oral)   Resp 16   Wt 261 lb 11 oz (118.7 kg)   SpO2 94%   BMI 40.99 kg/m²       General appearance:  No apparent distress, appears stated age and cooperative.   HEENT:  Normal cephalic, atraumatic without obvious deformity. Pupils equal, round, and reactive to light. Extra ocular muscles intact. Conjunctivae/corneas clear. Neck: Supple, with full range of motion. No jugular venous distention. Trachea midline. Respiratory:  Normal respiratory effort. Clear to auscultation, bilaterally without Rales/Wheezes/Rhonchi. Cardiovascular:  Regular rate and rhythm with normal S1/S2 without murmurs, rubs or gallops. Abdomen: Soft, non-tender, non-distended with normal bowel sounds. Musculoskeletal:  No clubbing, cyanosis or edema bilaterally. Full range of motion without deformity. Skin: Skin color, texture, turgor normal.  No rashes or lesions. Neurologic:  Neurovascularly intact without any focal sensory/motor deficits. Cranial nerves: II-XII intact, grossly non-focal.  Psychiatric:  Alert and oriented, thought content appropriate, normal insight  Capillary Refill: Brisk,< 3 seconds   Peripheral Pulses: +2 palpable, equal bilaterally       Labs: For convenience and continuity at follow-up the following most recent labs are provided:      CBC:    Lab Results   Component Value Date    WBC 9.2 09/08/2021    HGB 9.2 09/08/2021    HCT 27.4 09/08/2021     09/08/2021       Renal:    Lab Results   Component Value Date     09/08/2021    K 4.6 09/08/2021    K 4.9 08/29/2021    CL 95 09/08/2021    CO2 20 09/08/2021    BUN 57 09/08/2021    CREATININE 7.5 09/08/2021    CALCIUM 8.7 09/08/2021    PHOS 7.8 09/08/2021         Significant Diagnostic Studies    Radiology:   MRI BRAIN WO CONTRAST   Final Result   Small acute infarction in the right cerebellar hemisphere. Small old infarctions in the left cerebellar hemisphere. Old lacunar   infarcts in the right cerebellar hemisphere, left midbrain and right thalamus. Mild parenchymal volume loss. Mild chronic microvascular disease. VL DUP CAROTID BILATERAL   Final Result      CT HEAD WO CONTRAST   Final Result   No acute intracranial abnormality. XR CHEST PORTABLE   Final Result   No radiographic evidence of acute pulmonary disease. Consults:     IP CONSULT TO NEUROLOGY  IP CONSULT TO NEPHROLOGY  IP CONSULT TO CARDIOLOGY  IP CONSULT TO HOME CARE NEEDS    Disposition:  Home with Srinivasan Morel     Condition at Discharge: Stable    Discharge Instructions/Follow-up:  Follow-up with PCP and Cardiology     Code Status:  Full Code     Activity: activity as tolerated    Diet: diabetic diet and renal diet      Discharge Medications:     Discharge Medication List as of 9/12/2021 12:35 PM           Details   losartan (COZAAR) 25 MG tablet Take 1 tablet by mouth daily, Disp-30 tablet, R-3Normal              Details   torsemide (DEMADEX) 100 MG tablet Take 1 tablet by mouth daily, Disp-30 tablet, R-3Normal      isosorbide mononitrate (IMDUR) 30 MG extended release tablet TAKE 1 TABLET BY MOUTH EVERY DAY, Disp-30 tablet, R-10Normal      pantoprazole (PROTONIX) 40 MG tablet TAKE (1) TABLET BY MOUTH EACH MORNING BEFORE BREAKFAST, Disp-30 tablet, R-1Normal      Calcium Acetate, Phos Binder, 667 MG CAPS TAKE 1 CAPSULE BY MOUTH THREE TIMES DAILY WITH MEALS, Disp-90 capsule, R-3Normal      pravastatin (PRAVACHOL) 40 MG tablet TAKE (1) TABLET BY MOUTH IN THE EVENING, Disp-30 tablet, R-1Normal      QUEtiapine (SEROQUEL) 50 MG tablet TAKE (1) TABLET BY MOUTH IN THE EVENING, Disp-30 tablet, R-2Normal      BASAGLAR KWIKPEN 100 UNIT/ML injection pen ADMINISTER 60 UNITS UNDER THE SKIN EVERY NIGHT, Disp-15 mL, R-5Normal      hydrocortisone (ANUSOL-HC) 2.5 % CREA rectal cream Place rectally 2 times daily, Rectal, 2 TIMES DAILY Starting Wed 6/9/2021, Disp-30 g, R-0, Normal      albuterol (PROVENTIL) (2.5 MG/3ML) 0.083% nebulizer solution INHALE 1 VIAL VIA NEBULIZER EVERY 6 HOURS AS NEEDED FOR WHEEZING, Disp-300 mL, R-5Normal      nystatin (MYCOSTATIN) 283517 UNIT/GM cream Apply topically 2 times daily. , Disp-60 g, R-3, Normal      famotidine (PEPCID) 20 MG tablet TAKE 1 TABLET BY MOUTH TWICE DAILY AS NEEDED FOR HEARTBURN, Disp-180 tablet, R-0**Patient requests 90 days supply**Normal      hydrOXYzine (VISTARIL) 50 MG capsule TAKE 1 TO 2 CAPSULES BY MOUTH NIGHTLY, Disp-60 capsule, R-5Normal      gabapentin (NEURONTIN) 100 MG capsule TAKE 1 TO 2 CAPSULES BY MOUTH THREE TIMES A DAY, Disp-180 capsule, R-5Normal      LINZESS 145 MCG capsule TAKE 1 CAPSULE BY MOUTH EVERY MORNING BEFORE BREAKFAST, Disp-30 capsule, R-10Normal      hydrALAZINE (APRESOLINE) 50 MG tablet TAKE 1/2 TABLET BY MOUTH EVERY 8 HOURS, Disp-90 tablet, R-2Normal      traZODone (DESYREL) 150 MG tablet TAKE (1) TABLET BY MOUTH NIGHTLY, Disp-30 tablet, R-10Normal      cyclobenzaprine (FLEXERIL) 10 MG tablet TAKE 1 TABLET BY MOUTH EVERY 8 HOURS AS NEEDED, Disp-90 tablet, R-5Normal      DULoxetine (CYMBALTA) 30 MG extended release capsule TAKE 1 CAPSULE BY MOUTH EVERY DAY, Disp-30 capsule, R-10Normal      tiZANidine (ZANAFLEX) 4 MG tablet TAKE 1 TABLET BY MOUTH THREE TIMES DAILY, Disp-90 tablet, R-10Normal      simethicone (MYLICON) 80 MG chewable tablet Take 1 tablet by mouth every 6 hours as needed (cramping), Disp-15 tablet, R-0Normal      !! glucose monitoring kit (FREESTYLE) monitoring kit DAILY Starting Fri 1/8/2021, Disp-1 kit, R-0, Normal      !! blood glucose test strips (GLUCOSE METER TEST) strip 1 each by In Vitro route 5 times daily As needed. , Disp-100 each, R-3Normal      nitroGLYCERIN (NITROSTAT) 0.4 MG SL tablet DISSOLVE 1 TABLET UNDER THE TONGUE AS NEEDED FOR CHEST PAIN EVERY 5 MINUTES UP TO 3 TIMES.  IF NO RELIEF CALL 911., Disp-25 tablet, R-10Normal      B Complex-C-Folic Acid (VIRT-CAPS) 1 MG CAPS TK ONE C PO  QD, Disp-90 capsule,R-1Normal      citalopram (CELEXA) 40 MG tablet TAKE (1) TABLET BY MOUTH DAILY, Disp-30 tablet,R-10Normal      insulin aspart (NOVOLOG FLEXPEN) 100 UNIT/ML injection pen Inject 20 Units into the skin 3 times daily (before meals), Disp-15 pen,R-5Normal      ondansetron (ZOFRAN ODT) 4 MG disintegrating tablet Take 1 tablet by mouth every 8 hours as needed for Nausea, Disp-60 tablet,R-0Normal      !! blood glucose test strips (FREESTYLE LITE) strip Disp-100 strip, R-3, NormalDaily As needed. !! glucose monitoring kit (FREESTYLE) monitoring kit DAILY Starting Mon 8/19/2019, Disp-1 kit, R-0, Normal      vitamin D (ERGOCALCIFEROL) 79159 units CAPS capsule TK 1 C PO WEEKLY, R-11Historical Med      Tiotropium Bromide-Olodaterol (STIOLTO RESPIMAT) 2.5-2.5 MCG/ACT AERS Inhale 2 puffs into the lungs daily, Disp-2 Inhaler, R-02 samples given:  Lot #[id number], Exp 7/21 & Lot #[id number], Exp 7/21NO PRINT      Polyethylene Glycol 3350 GRAN Starting Wed 5/2/2018, Historical Med      !! Glucose Blood (BLOOD GLUCOSE TEST STRIPS) STRP TEST 3-4 TIMES DAILY, AS DIRECTED, Disp-100 strip, R-3Normal      Blood Glucose Monitoring Suppl ADAM Disp-1 Device, R-0, NormalUSE AS DIRECTED. Alcohol Swabs PADS Disp-300 each, R-3, NormalUSE AS DIRECTED      albuterol sulfate  (90 Base) MCG/ACT inhaler Inhale 2 puffs into the lungs every 6 hours as needed for Wheezing, Disp-1 Inhaler, R-3Print      ipratropium-albuterol (DUONEB) 0.5-2.5 (3) MG/3ML SOLN nebulizer solution Inhale 3 mLs into the lungs every 6 hours as needed for Shortness of Breath, Disp-360 mL, R-1Print      calcium carbonate (TUMS) 500 MG chewable tablet Take 1 tablet by mouth 3 times daily as needed for Heartburn. aspirin 81 MG chewable tablet Take 1 tablet by mouth daily. , Disp-30 tablet, R-2       !! - Potential duplicate medications found. Please discuss with provider. Time Spent on discharge is more than 45 minutes in the examination, evaluation, counseling and review of medications and discharge plan. Signed:    JAY Stokes - CNP   9/12/2021      Thank you Mercedez Winston MD for the opportunity to be involved in this patient's care.  If you have any questions or concerns please feel free to contact me at [phone].

## 2021-09-12 NOTE — PROGRESS NOTES
[facility]  Nephrology Follow up Note           Reason for Consult:  ESRD  Requesting Physician:  Dr. Ron Sheridan history    HD  On 9/10 w 4l U, post wt 118.7 kg    HD on 9/8 w 4.3 l UF, post wt 118 kg     ROS: BP stable  PSFH: No visitor    Scheduled Meds:   losartan  25 mg Oral Daily    pravastatin  40 mg Oral Nightly    gabapentin  100 mg Oral TID    epoetin siddharth-epbx  1,600 Units IntraVENous Once per day on Mon Wed Fri    doxercalciferol  3 mcg IntraVENous Q MWF    aspirin  81 mg Oral Daily    insulin glargine  45 Units SubCUTAneous Nightly    citalopram  40 mg Oral Daily    DULoxetine  30 mg Oral Daily    [Held by provider] hydrALAZINE  25 mg Oral 3 times per day    isosorbide mononitrate  30 mg Oral Daily    linaclotide  145 mcg Oral QAM AC    pantoprazole  40 mg Oral QAM AC    QUEtiapine  50 mg Oral Nightly    tiotropium-olodaterol  2 puff Inhalation Daily    torsemide  100 mg Oral Daily    traZODone  150 mg Oral Nightly    sodium chloride flush  5-40 mL IntraVENous 2 times per day    heparin (porcine)  5,000 Units SubCUTAneous 3 times per day    insulin lispro  0-6 Units SubCUTAneous TID WC    insulin lispro  0-3 Units SubCUTAneous Nightly     Continuous Infusions:   sodium chloride       PRN Meds:.cyclobenzaprine, oxyCODONE-acetaminophen, albumin human, heparin (porcine), albuterol sulfate HFA, ipratropium-albuterol, sodium chloride flush, sodium chloride, ondansetron **OR** ondansetron, polyethylene glycol    History of Present Illness on 9/8/21:    46 y.o. yo male with PMH of ESRD, HTN, CHFrEF, CAD, DM, COPD who is admitted for possible CVA  Pt noticed the symptoms of slurred speech and left arm tingling on Monday at HD , was asked to go to ED which he didn't .  As the symptoms persisted he came to ED on 9/7 and has been admitted  CT scan of head was wo acute abnormality    Physical exam:   Constitutional:  VITALS:  BP (!) 149/71   Pulse 83   Temp 97.4 °F (36.3 °C) (Oral)   Resp 16   Wt 261 lb 11 oz (118.7 kg)   SpO2 94%   BMI 40.99 kg/m²   Gen: alert, awake, nad  HEENT: pupils reactive  Neck: no bruits or jvd noted  Cardiovascular:  S1, S2 without m/r/g; trace lower extremity edema; wounds over the right leg  Respiratory: CTA B without w/r/r; respiratory effort normal  Abdomen:  +bs, soft, nt, nd, no hepatosplenomegaly  Neuro/Psy: AAoriented times 3 ; moves all 4 ext    Data/  No results for input(s): WBC, HGB, HCT, MCV, PLT in the last 72 hours. No results for input(s): NA, K, CL, CO2, GLUCOSE, PHOS, MG, BUN, CREATININE, LABGLOM, GFRAA in the last 72 hours. Invalid input(s): CA  Impression:     Small acute infarction in the right cerebellar hemisphere. Small old infarctions in the left cerebellar hemisphere.  Old lacunar   infarcts in the right cerebellar hemisphere, left midbrain and right thalamus. Mild parenchymal volume loss. Mild chronic microvascular disease. Summary   Technically difficult examination. Image quality limits accurate wall motion   and ejection fraction analysis. The left ventricular systolic function is moderately reduced with an   ejection fraction of 40-45 %. Mild concentric left ventricular hypertrophy. Definity contrast administered with no evidence of left ventricular mass or   thrombus noted. Moderate global hypokinesis with regional variation. Grade II diastolic dysfunction with elevated filing pressure. Compared to last echo on 1/5/2021 (EF 55%), left ventricle systolic function   has decreased. The left atrium is mildly dilated. Individual aortic valve leaflets are not clearly visualized. Normally functioning TAVR 29 mm Langford vanita S3 bioprosthetic valve in   aortic position appears well seated with a max velocity of 2.27 m/sec,   maximum gradient of 21 mmHg and a mean gradient of 13 mmHg. Trace aortic valve regurgitation. Mild mitral regurgitation.    A bubble study was performed and fails to show evidence of shunting. Assessment  -ESRD on HD MWF  -HTN   -Anemia  -CKD/MBD w elevated phos  -hyponatremia  -Metabolic acidosis  -Left UE tingling n slurred speech  From  CVA on the R cerebellar hemisphere    Plan  -HD per MWF schedule  -renal dose meds    Ok to dc from renal std pt    Thank you for the consultation. Please do not hesitate to call with questions. Dino Portillo MD  Office: [phone]  Fax:    [phone]  [email or web address]

## 2021-09-12 NOTE — CONSULTS
[id number]    Slurred speech  CVA  CMP  CAD, stent  DM  RADHA, <50%  Peripheral stents   TAVR  ESRD/HD  Elev trop - chronic   HTN  HLD  Anemia  COPD  Smoker    OK discharge  30 day monitor

## 2021-09-13 ENCOUNTER — TELEPHONE (OUTPATIENT)
Dept: CARDIOLOGY | Age: 51
End: 2021-09-13

## 2021-09-13 DIAGNOSIS — I48.91 ATRIAL FIBRILLATION, UNSPECIFIED TYPE (HCC): ICD-10-CM

## 2021-09-13 DIAGNOSIS — I63.9 ACUTE CEREBROVASCULAR ACCIDENT (CVA) (HCC): Primary | ICD-10-CM

## 2021-09-13 DIAGNOSIS — L03.90 CELLULITIS, UNSPECIFIED CELLULITIS SITE: ICD-10-CM

## 2021-09-13 RX ORDER — OXYCODONE AND ACETAMINOPHEN 7.5; 325 MG/1; MG/1
1 TABLET ORAL
Qty: 150 TABLET | Refills: 0 | Status: ON HOLD | OUTPATIENT
Start: 2021-09-13 | End: 2021-10-15 | Stop reason: HOSPADM

## 2021-09-13 NOTE — TELEPHONE ENCOUNTER
Patient is requesting a rx for  oxyCODONE-acetaminophen (PERCOCET) 7.5-325 MG per tablet 1 tablet     Last seen 6/22/21      Next office visit   10/5/2021 Master f/u

## 2021-09-13 NOTE — TELEPHONE ENCOUNTER
Patient was discharged home on 9/12/21. Please ask the patient if he would like to come to the office to have a 30 day event monitor applied or have it mailed to him. Also he needs a 6 week hsfu with EP CNP. Please call patient to schedule the above. Thank You.

## 2021-09-13 NOTE — TELEPHONE ENCOUNTER
Last Office Visit  -  6/22/21  Next Office Visit  -  10/5/21    Last Filled  -  8/3/21  Last UDS -  n/a  Contract -  N/A

## 2021-09-14 ENCOUNTER — TELEPHONE (OUTPATIENT)
Dept: FAMILY MEDICINE CLINIC | Age: 51
End: 2021-09-14

## 2021-09-14 NOTE — TELEPHONE ENCOUNTER
Higinio Esquivel, RN from [facility] is at the patient's house right now. She said she received orders aboutsome DM ulcers on the patient's right foot. However she said there aren't any order for what to do with the ulcers. She will need the orders on what exactly you what them to do and how often. The nurse is at the patient's home currently so if you can get to this message soon she will be able to take care of them while she is there. SHe said you can fax or call a verbal order in to her.     AM:[phone]  :[phone]

## 2021-09-16 ENCOUNTER — TELEPHONE (OUTPATIENT)
Dept: CARDIOLOGY CLINIC | Age: 51
End: 2021-09-16

## 2021-09-16 ENCOUNTER — TELEPHONE (OUTPATIENT)
Dept: FAMILY MEDICINE CLINIC | Age: 51
End: 2021-09-16

## 2021-09-16 NOTE — TELEPHONE ENCOUNTER
Frannie Rodriguez from [facility] called to ask if [facility] would provide a referral for Hospice care and if she was willing to follow pt. I did advise this would probably have to come from PCP. Please advise.

## 2021-09-16 NOTE — TELEPHONE ENCOUNTER
Luisa pt's home health nurse called to let [facility] know that pt told her today that he thinks he had another stoke last night. Wendi Blackwell stated he is elongating his words and has tingling in his limbs. Wendi Blackwell stated vitals are fine. BP was 140/72. Denies pt having any other symptoms. I did advise pt should go to the ED to be evaluated but pt is hesitant to go Jorge Castro advised pt of this also). Wendi Blackwell wanted to make sure [facility] was aware what was going on/get [facility]'s recommendations.   Wendi Blackwell can be reached @ [phone]

## 2021-09-16 NOTE — TELEPHONE ENCOUNTER
Rael Soares from hospice needs a referral hospice services also will need H & P med list fax # [phone] this is the pt's request has appt Saturday  at noon

## 2021-09-16 NOTE — TELEPHONE ENCOUNTER
Patient was recently hospitalized and I see no mention of hospice care. Recommend follow up with PCP. Recommend ED evaluation if concern for stroke symptoms.

## 2021-09-20 RX ORDER — NEPHROCAP 1 MG
CAP ORAL
Qty: 90 CAPSULE | Refills: 1 | Status: SHIPPED | OUTPATIENT
Start: 2021-09-20

## 2021-09-26 PROCEDURE — 93228 REMOTE 30 DAY ECG REV/REPORT: CPT | Performed by: INTERNAL MEDICINE

## 2021-09-28 ENCOUNTER — TELEPHONE (OUTPATIENT)
Dept: FAMILY MEDICINE CLINIC | Age: 51
End: 2021-09-28

## 2021-09-28 PROCEDURE — G0180 MD CERTIFICATION HHA PATIENT: HCPCS | Performed by: FAMILY MEDICINE

## 2021-10-01 ENCOUNTER — TELEPHONE (OUTPATIENT)
Dept: FAMILY MEDICINE CLINIC | Age: 51
End: 2021-10-01

## 2021-10-01 NOTE — TELEPHONE ENCOUNTER
Therapist w/ [facility] called to inform PCP that pt missed/refused both PT visits this week and will probably be d/c'd next week due to non compliance. Will not answer the phone to r/s any of his missed appts.

## 2021-10-05 ENCOUNTER — OFFICE VISIT (OUTPATIENT)
Dept: FAMILY MEDICINE CLINIC | Age: 51
End: 2021-10-05
Payer: COMMERCIAL

## 2021-10-05 VITALS
HEART RATE: 101 BPM | DIASTOLIC BLOOD PRESSURE: 80 MMHG | SYSTOLIC BLOOD PRESSURE: 122 MMHG | OXYGEN SATURATION: 98 % | WEIGHT: 257 LBS | BODY MASS INDEX: 40.25 KG/M2

## 2021-10-05 DIAGNOSIS — Z23 NEED FOR PROPHYLACTIC VACCINATION AND INOCULATION AGAINST INFLUENZA: Primary | ICD-10-CM

## 2021-10-05 DIAGNOSIS — F32.A DEPRESSION, UNSPECIFIED DEPRESSION TYPE: ICD-10-CM

## 2021-10-05 PROCEDURE — 1111F DSCHRG MED/CURRENT MED MERGE: CPT | Performed by: FAMILY MEDICINE

## 2021-10-05 PROCEDURE — 90674 CCIIV4 VAC NO PRSV 0.5 ML IM: CPT | Performed by: FAMILY MEDICINE

## 2021-10-05 PROCEDURE — G8417 CALC BMI ABV UP PARAM F/U: HCPCS | Performed by: FAMILY MEDICINE

## 2021-10-05 PROCEDURE — G8427 DOCREV CUR MEDS BY ELIG CLIN: HCPCS | Performed by: FAMILY MEDICINE

## 2021-10-05 PROCEDURE — 3046F HEMOGLOBIN A1C LEVEL >9.0%: CPT | Performed by: FAMILY MEDICINE

## 2021-10-05 PROCEDURE — 4004F PT TOBACCO SCREEN RCVD TLK: CPT | Performed by: FAMILY MEDICINE

## 2021-10-05 PROCEDURE — 99214 OFFICE O/P EST MOD 30 MIN: CPT | Performed by: FAMILY MEDICINE

## 2021-10-05 PROCEDURE — G0008 ADMIN INFLUENZA VIRUS VAC: HCPCS | Performed by: FAMILY MEDICINE

## 2021-10-05 PROCEDURE — G8482 FLU IMMUNIZE ORDER/ADMIN: HCPCS | Performed by: FAMILY MEDICINE

## 2021-10-05 PROCEDURE — 3017F COLORECTAL CA SCREEN DOC REV: CPT | Performed by: FAMILY MEDICINE

## 2021-10-05 RX ORDER — BLOOD-GLUCOSE,RECEIVER,CONT
EACH MISCELLANEOUS
Qty: 9 EACH | Refills: 3 | Status: CANCELLED | OUTPATIENT
Start: 2021-10-05

## 2021-10-05 RX ORDER — BLOOD-GLUCOSE SENSOR
EACH MISCELLANEOUS
Qty: 9 EACH | Refills: 3 | Status: CANCELLED | OUTPATIENT
Start: 2021-10-05

## 2021-10-05 RX ORDER — HYDRALAZINE HYDROCHLORIDE 50 MG/1
TABLET, FILM COATED ORAL
Qty: 120 TABLET | Refills: 2 | Status: ON HOLD | OUTPATIENT
Start: 2021-10-05 | End: 2021-10-15 | Stop reason: HOSPADM

## 2021-10-05 RX ORDER — BLOOD-GLUCOSE SENSOR
EACH MISCELLANEOUS
Qty: 9 EACH | Refills: 3 | Status: SHIPPED | OUTPATIENT
Start: 2021-10-05 | End: 2022-11-01

## 2021-10-05 RX ORDER — DULOXETIN HYDROCHLORIDE 60 MG/1
CAPSULE, DELAYED RELEASE ORAL
Qty: 30 CAPSULE | Refills: 5 | Status: SHIPPED | OUTPATIENT
Start: 2021-10-05 | End: 2022-04-04

## 2021-10-05 RX ORDER — BLOOD-GLUCOSE TRANSMITTER
EACH MISCELLANEOUS
Qty: 9 EACH | Refills: 3 | Status: CANCELLED | OUTPATIENT
Start: 2021-10-05

## 2021-10-05 RX ORDER — BLOOD-GLUCOSE TRANSMITTER
1 EACH MISCELLANEOUS
Qty: 1 EACH | Refills: 3 | Status: SHIPPED | OUTPATIENT
Start: 2021-10-05 | End: 2022-11-01

## 2021-10-05 RX ORDER — BLOOD-GLUCOSE,RECEIVER,CONT
1 EACH MISCELLANEOUS
Qty: 1 EACH | Refills: 0 | Status: SHIPPED | OUTPATIENT
Start: 2021-10-05 | End: 2022-11-01

## 2021-10-07 ENCOUNTER — TELEPHONE (OUTPATIENT)
Dept: FAMILY MEDICINE CLINIC | Age: 51
End: 2021-10-07

## 2021-10-07 NOTE — TELEPHONE ENCOUNTER
[facility] documenting that patient had Physical Therapy and Occupational Therapy. missed visit today. Patient will be discharged from PT and OT.

## 2021-10-07 NOTE — TELEPHONE ENCOUNTER
Caller stated they had an OT visit scheduled for today. Patient was not home. They have been trying to connect with patient for the last few weeks without success.   Patient will be d/c from OT

## 2021-10-11 ENCOUNTER — APPOINTMENT (OUTPATIENT)
Dept: CT IMAGING | Age: 51
DRG: 064 | End: 2021-10-11
Payer: COMMERCIAL

## 2021-10-11 ENCOUNTER — HOSPITAL ENCOUNTER (INPATIENT)
Age: 51
LOS: 4 days | Discharge: HOME | DRG: 064 | End: 2021-10-15
Attending: EMERGENCY MEDICINE | Admitting: HOSPITALIST
Payer: COMMERCIAL

## 2021-10-11 ENCOUNTER — APPOINTMENT (OUTPATIENT)
Dept: GENERAL RADIOLOGY | Age: 51
DRG: 064 | End: 2021-10-11
Payer: COMMERCIAL

## 2021-10-11 DIAGNOSIS — I63.89 CEREBROVASCULAR ACCIDENT (CVA) DUE TO OTHER MECHANISM (HCC): Primary | ICD-10-CM

## 2021-10-11 DIAGNOSIS — G89.4 CHRONIC PAIN SYNDROME: ICD-10-CM

## 2021-10-11 DIAGNOSIS — R41.0 DELIRIUM: ICD-10-CM

## 2021-10-11 DIAGNOSIS — N18.6 ESRD (END STAGE RENAL DISEASE) ON DIALYSIS (HCC): ICD-10-CM

## 2021-10-11 DIAGNOSIS — L03.90 CELLULITIS, UNSPECIFIED CELLULITIS SITE: ICD-10-CM

## 2021-10-11 DIAGNOSIS — Z99.2 ESRD (END STAGE RENAL DISEASE) ON DIALYSIS (HCC): ICD-10-CM

## 2021-10-11 PROBLEM — F10.10 ALCOHOL ABUSE, DAILY USE: Status: ACTIVE | Noted: 2021-10-11

## 2021-10-11 PROBLEM — I63.9 ACUTE CVA (CEREBROVASCULAR ACCIDENT) (HCC): Status: ACTIVE | Noted: 2021-10-11

## 2021-10-11 LAB
A/G RATIO: 1.1 (ref 1.1–2.2)
ALBUMIN SERPL-MCNC: 3.7 G/DL (ref 3.4–5)
ALP BLD-CCNC: 100 U/L (ref 40–129)
ALT SERPL-CCNC: 10 U/L (ref 10–40)
AMMONIA: 14 UMOL/L (ref 16–60)
ANION GAP SERPL CALCULATED.3IONS-SCNC: 15 MMOL/L (ref 3–16)
APTT: 36 SEC (ref 26.2–38.6)
AST SERPL-CCNC: 13 U/L (ref 15–37)
BASE EXCESS VENOUS: -4.9 MMOL/L (ref -3–3)
BASE EXCESS VENOUS: -6.1 MMOL/L (ref -3–3)
BASOPHILS ABSOLUTE: 0 K/UL (ref 0–0.2)
BASOPHILS RELATIVE PERCENT: 0.3 %
BILIRUB SERPL-MCNC: <0.2 MG/DL (ref 0–1)
BUN BLDV-MCNC: 76 MG/DL (ref 7–20)
CALCIUM IONIZED: 0.98 MMOL/L (ref 1.12–1.32)
CALCIUM SERPL-MCNC: 9.2 MG/DL (ref 8.3–10.6)
CARBOXYHEMOGLOBIN: 3 % (ref 0–1.5)
CARBOXYHEMOGLOBIN: 5.4 % (ref 0–1.5)
CHLORIDE BLD-SCNC: 103 MMOL/L (ref 99–110)
CO2: 22 MMOL/L (ref 21–32)
CREAT SERPL-MCNC: 8.3 MG/DL (ref 0.9–1.3)
EKG ATRIAL RATE: 91 BPM
EKG DIAGNOSIS: NORMAL
EKG P AXIS: 72 DEGREES
EKG P-R INTERVAL: 186 MS
EKG Q-T INTERVAL: 376 MS
EKG QRS DURATION: 92 MS
EKG QTC CALCULATION (BAZETT): 462 MS
EKG R AXIS: -13 DEGREES
EKG T AXIS: 97 DEGREES
EKG VENTRICULAR RATE: 91 BPM
EOSINOPHILS ABSOLUTE: 0.4 K/UL (ref 0–0.6)
EOSINOPHILS RELATIVE PERCENT: 3.5 %
ETHANOL: NORMAL MG/DL (ref 0–0.08)
GFR AFRICAN AMERICAN: 8
GFR NON-AFRICAN AMERICAN: 7
GLOBULIN: 3.3 G/DL
GLUCOSE BLD-MCNC: 116 MG/DL (ref 70–99)
GLUCOSE BLD-MCNC: 167 MG/DL (ref 70–99)
HCO3 VENOUS: 14.6 MMOL/L (ref 23–29)
HCO3 VENOUS: 18.8 MMOL/L (ref 23–29)
HCT VFR BLD CALC: 34.4 % (ref 40.5–52.5)
HEMOGLOBIN: 11.2 G/DL (ref 13.5–17.5)
INR BLD: 0.98 (ref 0.88–1.12)
LACTIC ACID: 0.7 MMOL/L (ref 0.4–2)
LYMPHOCYTES ABSOLUTE: 1.3 K/UL (ref 1–5.1)
LYMPHOCYTES RELATIVE PERCENT: 11 %
MAGNESIUM: 2.3 MG/DL (ref 1.8–2.4)
MCH RBC QN AUTO: 29.3 PG (ref 26–34)
MCHC RBC AUTO-ENTMCNC: 32.5 G/DL (ref 31–36)
MCV RBC AUTO: 90.2 FL (ref 80–100)
METHEMOGLOBIN VENOUS: 0.3 %
METHEMOGLOBIN VENOUS: 0.4 %
MONOCYTES ABSOLUTE: 0.6 K/UL (ref 0–1.3)
MONOCYTES RELATIVE PERCENT: 4.9 %
NEUTROPHILS ABSOLUTE: 9.2 K/UL (ref 1.7–7.7)
NEUTROPHILS RELATIVE PERCENT: 80.3 %
O2 SAT, VEN: 98 %
O2 SAT, VEN: 98 %
O2 THERAPY: ABNORMAL
O2 THERAPY: ABNORMAL
PCO2, VEN: 15.4 MMHG (ref 40–50)
PCO2, VEN: 35.4 MMHG (ref 40–50)
PDW BLD-RTO: 15.9 % (ref 12.4–15.4)
PERFORMED ON: ABNORMAL
PH VENOUS: 7.34 (ref 7.35–7.45)
PH VENOUS: 7.34 (ref 7.35–7.45)
PH VENOUS: 7.59 (ref 7.35–7.45)
PLATELET # BLD: 244 K/UL (ref 135–450)
PMV BLD AUTO: 8.9 FL (ref 5–10.5)
PO2, VEN: 183.3 MMHG (ref 25–40)
PO2, VEN: 207.9 MMHG (ref 25–40)
POTASSIUM REFLEX MAGNESIUM: 4.7 MMOL/L (ref 3.5–5.1)
PROTHROMBIN TIME: 11.1 SEC (ref 9.9–12.7)
RBC # BLD: 3.82 M/UL (ref 4.2–5.9)
SALICYLATE, SERUM: <0.3 MG/DL (ref 15–30)
SODIUM BLD-SCNC: 140 MMOL/L (ref 136–145)
TCO2 CALC VENOUS: 15 MMOL/L
TCO2 CALC VENOUS: 20 MMOL/L
TOTAL PROTEIN: 7 G/DL (ref 6.4–8.2)
TROPONIN: 0.13 NG/ML
TROPONIN: 0.14 NG/ML
WBC # BLD: 11.4 K/UL (ref 4–11)

## 2021-10-11 PROCEDURE — 71045 X-RAY EXAM CHEST 1 VIEW: CPT

## 2021-10-11 PROCEDURE — 82330 ASSAY OF CALCIUM: CPT

## 2021-10-11 PROCEDURE — 83605 ASSAY OF LACTIC ACID: CPT

## 2021-10-11 PROCEDURE — 82803 BLOOD GASES ANY COMBINATION: CPT

## 2021-10-11 PROCEDURE — 83735 ASSAY OF MAGNESIUM: CPT

## 2021-10-11 PROCEDURE — 70450 CT HEAD/BRAIN W/O DYE: CPT

## 2021-10-11 PROCEDURE — 6370000000 HC RX 637 (ALT 250 FOR IP): Performed by: HOSPITALIST

## 2021-10-11 PROCEDURE — 84484 ASSAY OF TROPONIN QUANT: CPT

## 2021-10-11 PROCEDURE — 85610 PROTHROMBIN TIME: CPT

## 2021-10-11 PROCEDURE — 2580000003 HC RX 258: Performed by: HOSPITALIST

## 2021-10-11 PROCEDURE — 99284 EMERGENCY DEPT VISIT MOD MDM: CPT

## 2021-10-11 PROCEDURE — 82077 ASSAY SPEC XCP UR&BREATH IA: CPT

## 2021-10-11 PROCEDURE — 80053 COMPREHEN METABOLIC PANEL: CPT

## 2021-10-11 PROCEDURE — 84443 ASSAY THYROID STIM HORMONE: CPT

## 2021-10-11 PROCEDURE — 85730 THROMBOPLASTIN TIME PARTIAL: CPT

## 2021-10-11 PROCEDURE — 1200000000 HC SEMI PRIVATE

## 2021-10-11 PROCEDURE — 6360000002 HC RX W HCPCS: Performed by: HOSPITALIST

## 2021-10-11 PROCEDURE — 83036 HEMOGLOBIN GLYCOSYLATED A1C: CPT

## 2021-10-11 PROCEDURE — 82140 ASSAY OF AMMONIA: CPT

## 2021-10-11 PROCEDURE — 93005 ELECTROCARDIOGRAM TRACING: CPT | Performed by: EMERGENCY MEDICINE

## 2021-10-11 PROCEDURE — 80179 DRUG ASSAY SALICYLATE: CPT

## 2021-10-11 PROCEDURE — 84439 ASSAY OF FREE THYROXINE: CPT

## 2021-10-11 PROCEDURE — 85025 COMPLETE CBC W/AUTO DIFF WBC: CPT

## 2021-10-11 PROCEDURE — 93010 ELECTROCARDIOGRAM REPORT: CPT | Performed by: INTERNAL MEDICINE

## 2021-10-11 RX ORDER — LORAZEPAM 1 MG/1
3 TABLET ORAL
Status: DISCONTINUED | OUTPATIENT
Start: 2021-10-11 | End: 2021-10-13

## 2021-10-11 RX ORDER — SODIUM CHLORIDE 0.9 % (FLUSH) 0.9 %
10 SYRINGE (ML) INJECTION EVERY 12 HOURS SCHEDULED
Status: DISCONTINUED | OUTPATIENT
Start: 2021-10-11 | End: 2021-10-15 | Stop reason: HOSPADM

## 2021-10-11 RX ORDER — SODIUM CHLORIDE 9 MG/ML
25 INJECTION, SOLUTION INTRAVENOUS PRN
Status: DISCONTINUED | OUTPATIENT
Start: 2021-10-11 | End: 2021-10-15 | Stop reason: HOSPADM

## 2021-10-11 RX ORDER — LOSARTAN POTASSIUM 25 MG/1
25 TABLET ORAL DAILY
Status: DISCONTINUED | OUTPATIENT
Start: 2021-10-12 | End: 2021-10-15

## 2021-10-11 RX ORDER — THIAMINE HYDROCHLORIDE 100 MG/ML
100 INJECTION, SOLUTION INTRAMUSCULAR; INTRAVENOUS DAILY
Status: DISCONTINUED | OUTPATIENT
Start: 2021-10-11 | End: 2021-10-15 | Stop reason: HOSPADM

## 2021-10-11 RX ORDER — LORAZEPAM 1 MG/1
4 TABLET ORAL
Status: DISCONTINUED | OUTPATIENT
Start: 2021-10-11 | End: 2021-10-13

## 2021-10-11 RX ORDER — PRAVASTATIN SODIUM 40 MG
40 TABLET ORAL NIGHTLY
Status: DISCONTINUED | OUTPATIENT
Start: 2021-10-11 | End: 2021-10-15 | Stop reason: HOSPADM

## 2021-10-11 RX ORDER — CALCIUM ACETATE 667 MG/1
667 CAPSULE ORAL
Status: DISCONTINUED | OUTPATIENT
Start: 2021-10-12 | End: 2021-10-15 | Stop reason: HOSPADM

## 2021-10-11 RX ORDER — ASPIRIN 81 MG/1
81 TABLET ORAL DAILY
Status: DISCONTINUED | OUTPATIENT
Start: 2021-10-11 | End: 2021-10-14

## 2021-10-11 RX ORDER — LABETALOL HYDROCHLORIDE 5 MG/ML
10 INJECTION, SOLUTION INTRAVENOUS EVERY 10 MIN PRN
Status: DISCONTINUED | OUTPATIENT
Start: 2021-10-11 | End: 2021-10-15 | Stop reason: HOSPADM

## 2021-10-11 RX ORDER — QUETIAPINE FUMARATE 25 MG/1
25 TABLET, FILM COATED ORAL NIGHTLY
Status: DISCONTINUED | OUTPATIENT
Start: 2021-10-11 | End: 2021-10-15 | Stop reason: HOSPADM

## 2021-10-11 RX ORDER — LORAZEPAM 2 MG/ML
3 INJECTION INTRAMUSCULAR
Status: DISCONTINUED | OUTPATIENT
Start: 2021-10-11 | End: 2021-10-13

## 2021-10-11 RX ORDER — M-VIT,TX,IRON,MINS/CALC/FOLIC 27MG-0.4MG
1 TABLET ORAL DAILY
Status: DISCONTINUED | OUTPATIENT
Start: 2021-10-11 | End: 2021-10-15 | Stop reason: HOSPADM

## 2021-10-11 RX ORDER — LORAZEPAM 2 MG/ML
1 INJECTION INTRAMUSCULAR
Status: DISCONTINUED | OUTPATIENT
Start: 2021-10-11 | End: 2021-10-15 | Stop reason: HOSPADM

## 2021-10-11 RX ORDER — INSULIN GLARGINE 100 [IU]/ML
30 INJECTION, SOLUTION SUBCUTANEOUS 2 TIMES DAILY
Status: DISCONTINUED | OUTPATIENT
Start: 2021-10-11 | End: 2021-10-14

## 2021-10-11 RX ORDER — TRAZODONE HYDROCHLORIDE 50 MG/1
150 TABLET ORAL NIGHTLY
Status: DISCONTINUED | OUTPATIENT
Start: 2021-10-11 | End: 2021-10-15 | Stop reason: HOSPADM

## 2021-10-11 RX ORDER — CYCLOBENZAPRINE HCL 10 MG
10 TABLET ORAL 3 TIMES DAILY PRN
Status: DISCONTINUED | OUTPATIENT
Start: 2021-10-11 | End: 2021-10-15 | Stop reason: HOSPADM

## 2021-10-11 RX ORDER — SODIUM CHLORIDE 0.9 % (FLUSH) 0.9 %
10 SYRINGE (ML) INJECTION PRN
Status: DISCONTINUED | OUTPATIENT
Start: 2021-10-11 | End: 2021-10-15 | Stop reason: HOSPADM

## 2021-10-11 RX ORDER — BISACODYL 10 MG
10 SUPPOSITORY, RECTAL RECTAL DAILY PRN
Status: DISCONTINUED | OUTPATIENT
Start: 2021-10-11 | End: 2021-10-15 | Stop reason: HOSPADM

## 2021-10-11 RX ORDER — NICOTINE 21 MG/24HR
1 PATCH, TRANSDERMAL 24 HOURS TRANSDERMAL DAILY
Status: DISCONTINUED | OUTPATIENT
Start: 2021-10-11 | End: 2021-10-15 | Stop reason: HOSPADM

## 2021-10-11 RX ORDER — LORAZEPAM 2 MG/ML
2 INJECTION INTRAMUSCULAR
Status: DISCONTINUED | OUTPATIENT
Start: 2021-10-11 | End: 2021-10-15 | Stop reason: HOSPADM

## 2021-10-11 RX ORDER — DILTIAZEM HYDROCHLORIDE 180 MG/1
180 CAPSULE, COATED, EXTENDED RELEASE ORAL DAILY
Status: DISCONTINUED | OUTPATIENT
Start: 2021-10-12 | End: 2021-10-12

## 2021-10-11 RX ORDER — CALCIUM GLUCONATE 20 MG/ML
1000 INJECTION, SOLUTION INTRAVENOUS ONCE
Status: DISCONTINUED | OUTPATIENT
Start: 2021-10-11 | End: 2021-10-12

## 2021-10-11 RX ORDER — LORAZEPAM 1 MG/1
2 TABLET ORAL
Status: DISCONTINUED | OUTPATIENT
Start: 2021-10-11 | End: 2021-10-15 | Stop reason: HOSPADM

## 2021-10-11 RX ORDER — DEXTROSE MONOHYDRATE 50 MG/ML
100 INJECTION, SOLUTION INTRAVENOUS PRN
Status: DISCONTINUED | OUTPATIENT
Start: 2021-10-11 | End: 2021-10-15 | Stop reason: HOSPADM

## 2021-10-11 RX ORDER — ONDANSETRON 2 MG/ML
4 INJECTION INTRAMUSCULAR; INTRAVENOUS EVERY 6 HOURS PRN
Status: DISCONTINUED | OUTPATIENT
Start: 2021-10-11 | End: 2021-10-15 | Stop reason: HOSPADM

## 2021-10-11 RX ORDER — DEXTROSE MONOHYDRATE 25 G/50ML
12.5 INJECTION, SOLUTION INTRAVENOUS PRN
Status: DISCONTINUED | OUTPATIENT
Start: 2021-10-11 | End: 2021-10-15 | Stop reason: HOSPADM

## 2021-10-11 RX ORDER — LORAZEPAM 1 MG/1
1 TABLET ORAL
Status: DISCONTINUED | OUTPATIENT
Start: 2021-10-11 | End: 2021-10-15 | Stop reason: HOSPADM

## 2021-10-11 RX ORDER — DULOXETIN HYDROCHLORIDE 60 MG/1
60 CAPSULE, DELAYED RELEASE ORAL DAILY
Status: DISCONTINUED | OUTPATIENT
Start: 2021-10-12 | End: 2021-10-15 | Stop reason: HOSPADM

## 2021-10-11 RX ORDER — PANTOPRAZOLE SODIUM 40 MG/1
40 TABLET, DELAYED RELEASE ORAL
Status: DISCONTINUED | OUTPATIENT
Start: 2021-10-12 | End: 2021-10-15 | Stop reason: HOSPADM

## 2021-10-11 RX ORDER — PROMETHAZINE HYDROCHLORIDE 25 MG/1
12.5 TABLET ORAL EVERY 6 HOURS PRN
Status: DISCONTINUED | OUTPATIENT
Start: 2021-10-11 | End: 2021-10-15 | Stop reason: HOSPADM

## 2021-10-11 RX ORDER — SENNA PLUS 8.6 MG/1
1 TABLET ORAL DAILY PRN
Status: DISCONTINUED | OUTPATIENT
Start: 2021-10-11 | End: 2021-10-15 | Stop reason: HOSPADM

## 2021-10-11 RX ORDER — HEPARIN SODIUM 5000 [USP'U]/ML
5000 INJECTION, SOLUTION INTRAVENOUS; SUBCUTANEOUS EVERY 8 HOURS SCHEDULED
Status: DISCONTINUED | OUTPATIENT
Start: 2021-10-11 | End: 2021-10-15 | Stop reason: HOSPADM

## 2021-10-11 RX ORDER — ASPIRIN 300 MG/1
300 SUPPOSITORY RECTAL DAILY
Status: DISCONTINUED | OUTPATIENT
Start: 2021-10-11 | End: 2021-10-14

## 2021-10-11 RX ORDER — GABAPENTIN 100 MG/1
100 CAPSULE ORAL 3 TIMES DAILY
Status: DISCONTINUED | OUTPATIENT
Start: 2021-10-11 | End: 2021-10-15 | Stop reason: HOSPADM

## 2021-10-11 RX ORDER — ISOSORBIDE MONONITRATE 30 MG/1
30 TABLET, EXTENDED RELEASE ORAL DAILY
Status: DISCONTINUED | OUTPATIENT
Start: 2021-10-12 | End: 2021-10-15

## 2021-10-11 RX ORDER — CARVEDILOL 3.12 MG/1
12.5 TABLET ORAL 2 TIMES DAILY
Status: DISCONTINUED | OUTPATIENT
Start: 2021-10-11 | End: 2021-10-15

## 2021-10-11 RX ORDER — NICOTINE POLACRILEX 4 MG
15 LOZENGE BUCCAL PRN
Status: DISCONTINUED | OUTPATIENT
Start: 2021-10-11 | End: 2021-10-15 | Stop reason: HOSPADM

## 2021-10-11 RX ORDER — LORAZEPAM 2 MG/ML
4 INJECTION INTRAMUSCULAR
Status: DISCONTINUED | OUTPATIENT
Start: 2021-10-11 | End: 2021-10-13

## 2021-10-11 RX ADMIN — QUETIAPINE FUMARATE 25 MG: 25 TABLET ORAL at 22:32

## 2021-10-11 RX ADMIN — INSULIN GLARGINE 30 UNITS: 100 INJECTION, SOLUTION SUBCUTANEOUS at 22:44

## 2021-10-11 RX ADMIN — SODIUM CHLORIDE, PRESERVATIVE FREE 10 ML: 5 INJECTION INTRAVENOUS at 00:00

## 2021-10-11 RX ADMIN — LORAZEPAM 2 MG: 2 INJECTION INTRAMUSCULAR; INTRAVENOUS at 22:39

## 2021-10-11 RX ADMIN — Medication 1 TABLET: at 20:33

## 2021-10-11 RX ADMIN — TRAZODONE HYDROCHLORIDE 150 MG: 50 TABLET ORAL at 22:32

## 2021-10-11 RX ADMIN — LORAZEPAM 1 MG: 2 INJECTION INTRAMUSCULAR; INTRAVENOUS at 20:32

## 2021-10-11 RX ADMIN — ASPIRIN 81 MG: 81 TABLET, COATED ORAL at 20:33

## 2021-10-11 RX ADMIN — ONDANSETRON 4 MG: 2 INJECTION INTRAMUSCULAR; INTRAVENOUS at 20:33

## 2021-10-11 RX ADMIN — THIAMINE HYDROCHLORIDE 100 MG: 100 INJECTION, SOLUTION INTRAMUSCULAR; INTRAVENOUS at 20:33

## 2021-10-11 RX ADMIN — CARVEDILOL 12.5 MG: 3.12 TABLET, FILM COATED ORAL at 22:33

## 2021-10-11 ASSESSMENT — PAIN SCALES - GENERAL: PAINLEVEL_OUTOF10: 10

## 2021-10-11 NOTE — ED PROVIDER NOTES
[facility] Emergency Department      CHIEF COMPLAINT  Altered Mental Status (confused yesterday, wife called 46, pt sts\" i cant get enough sleep\")      HISTORY OF PRESENT ILLNESS  Mitchell Kumari is a 46 y.o. male with a history of end-stage renal disease, on dialysis every Monday, Wednesday and Friday also with a history of multiple strokes and ischemic cardiomyopathy presents with multiple complaints. Apparently his wife called 911 according to EMS because he has been hallucinating and confused at home. The patient tells us that he just has not been able to sleep. He states he has not gotten much sleep since he was admitted to the hospital last month. He also states that his left arm and leg feel weak and numb. He states that has been ongoing for the past several weeks. He denies chest pain or shortness of breath. He denies fevers. He does drink alcohol daily. He states he had 1-1/2 beers earlier today. .   No other complaints, modifying factors or associated symptoms. I have reviewed the following from the nursing documentation.     Past Medical History:   Diagnosis Date    Ambulatory dysfunction     walker for long distances, SOB with distance    Aortic stenosis     echo 2017    Arthritis     hands and hips    Asthma     Bilateral hilar adenopathy syndrome 6/3/2013    CAD (coronary artery disease)     Dr. Lior Jamil [facility]) 04/19/2019    EF= 43%    CHF [facility]congestive heart failure) ([facility])     Chronic pain     COPD (chronic obstructive pulmonary disease) ([address]     pulmonology Dr. Mary Beth Nunes    Depression     Diabetes mellitus ([address]     borderline    Difficult intravenous access     Emphysema of lung ([address]     ESRD (end stage renal disease) on dialysis ([address]     MWF    Fear of needles     Gastric ulcer     GERD (gastroesophageal reflux disease)     Heart valve problem     bicuspic valve    Hemodialysis patient ([address]     History of spinal fracture     work incident    Hx of blood clots     Bilateral lower extremities; stents in place    Hyperlipidemia     Hypertension     MI (myocardial infarction) ([facility] 2019    has had 9 MIs. 2019 was the last    Neuromuscular disorder ([address]     due to CVA    Numbness and tingling in left arm     from fistula    Pneumonia     PONV (postoperative nausea and vomiting)     Prolonged emergence from general anesthesia     States requires more medication than most people    Sleep apnea     Uses CPAP    Stroke ([facility]     7mm thalamic cva 2017 deficts left side, left side weakness    TIA (transient ischemic attack)     Unspecified diseases of blood and blood-forming organs      Past Surgical History:   Procedure Laterality Date    AORTIC VALVE REPLACEMENT N/A 10/15/2019    TRANSCATHETER AORTIC VALVE REPLACEMENT FEMORAL APPROACH performed by Andrew Erwin MD at [address] [place] [address] Ave Right 7/2/2019    PERITONEAL DIALYSIS CATHETER REMOVAL performed by Samira Chaney MD at [facility]  2/29/2015    WN    CORONARY ANGIOPLASTY WITH STENT PLACEMENT  05/26/15    CYST REMOVAL  08/14/2013    EXCISION CYSTS, NECK X2 AND ABDOMINAL benign    DIAGNOSTIC CARDIAC CATH LAB PROCEDURE      DIALYSIS FISTULA CREATION Left 10/30/2017    LEFT BRACHIAL CEPHALIC FISTULA    DIALYSIS FISTULA CREATION Left 3/27/2019    LIGATION  AV FISTULA performed by Karlos Garcia MD at [facility]. [address], [address], DIAGNOSTIC      OTHER SURGICAL HISTORY  02/01/2017    laparoscopic cholecystectomy with intraoperative cholangiogram    OTHER SURGICAL HISTORY  2018    PORT PLACEMENT  - vas cath    OTHER SURGICAL HISTORY Bilateral 06/26/2018    laprascopic peritoneal dialysis catheter placement    OTHER SURGICAL HISTORY Right 09/2018    peritoneal dialysis port placed on right side of abdomen    OTHER SURGICAL HISTORY  05/28/2019    PTA/Stenting R External Iliac artery    IN LAP INSERTION TUNNELED INTRAPERITONEAL CATHETER N/A 2018    LAPAROSCOPIC PERITONEAL DIALYSIS CATHETER REPLACEMENT performed by Ofe Huber MD at [place] [address] ENDOSCOPY  2016    UPPER GASTROINTESTINAL ENDOSCOPY  2017    possible candida, otherwise normal appearing    VASCULAR SURGERY  aprx 2 years ago    2 stents placed, each side of groin     Family History   Problem Relation Age of Onset    Diabetes Mother     Heart Disease Father     Kidney Disease Sister         stage 4-kidney failure    Cancer Sister     Heart Disease Sister     Obesity Sister     Cancer Sister     Heart Disease Sister     Obesity Sister     Alcohol Abuse Brother      Social History     Socioeconomic History    Marital status:      Spouse name: Not on file    Number of children: Not on file    Years of education: Not on file    Highest education level: Not on file   Occupational History    Not on file   Tobacco Use    Smoking status: Current Every Day Smoker     Packs/day: 0.50     Years: 33.00     Pack years: 16.50     Types: Cigarettes     Last attempt to quit: 2020     Years since quittin.4    Smokeless tobacco: Never Used   Vaping Use    Vaping Use: Never used   Substance and Sexual Activity    Alcohol use: Not Currently     Alcohol/week: 0.0 standard drinks     Comment: occ    Drug use: No    Sexual activity: Yes     Partners: Female     Comment:    Other Topics Concern    Not on file   Social History Narrative    Not on file     Social Determinants of Health     Financial Resource Strain:     Difficulty of Paying Living Expenses:    Food Insecurity:     Worried About Running Out of Food in the Last Year:     Ran Out of Food in the Last Year:    Transportation Needs:     Lack of Transportation (Medical):      Lack of Transportation (Non-Medical):    Physical Activity:     Days of Exercise per Week:     Minutes of Exercise per Session:    Stress:     Feeling of Stress :    Social Connections:     Frequency of Communication with Friends and Family:     Frequency of Social Gatherings with Friends and Family:     Attends Jehovah's witness Services:     Active Member of Clubs or Organizations:    Leoharjeet Attends Club or Organization Meetings:     Marital Status:    Intimate Partner Violence:     Fear of Current or Ex-Partner:     Emotionally Abused:     Physically Abused:     Sexually Abused:      Current Facility-Administered Medications   Medication Dose Route Frequency Provider Last Rate Last Admin    oxyCODONE-acetaminophen (PERCOCET) 5-325 MG per tablet 1 tablet  1 tablet Oral Q6H PRN JAY Wilhelm - CNP   1 tablet at 10/13/21 0514    LORazepam (ATIVAN) injection 1 mg  1 mg IntraVENous Q4H PRN Svitlana Glynn MD   1 mg at 10/13/21 1058    torsemide (DEMADEX) tablet 100 mg  100 mg Oral Daily Silva Palomares MD   100 mg at 10/13/21 0920    carvedilol (COREG) tablet 12.5 mg  12.5 mg Oral BID Silva Palomares MD   12.5 mg at 10/13/21 0920    [Held by provider] cyclobenzaprine (FLEXERIL) tablet 10 mg  10 mg Oral TID PRN Silva Palomares MD        linaclotide Paul Eaton) capsule 145 mcg  145 mcg Oral QAM AC Silva Palomarse MD        traZODone (DESYREL) tablet 150 mg  150 mg Oral Nightly Silva Palomares MD   150 mg at 10/11/21 2232    insulin glargine (LANTUS) injection vial 30 Units  30 Units SubCUTAneous BID Silva Palomares MD   30 Units at 10/12/21 2043    Calcium Acetate (Phos Binder) CAPS 667 mg  667 mg Oral TID WC Silva Palomares MD   667 mg at 10/12/21 1805    DULoxetine (CYMBALTA) extended release capsule 60 mg  60 mg Oral Daily Silva Palomares MD   60 mg at 10/13/21 0920    gabapentin (NEURONTIN) capsule 100 mg  100 mg Oral TID Silva Palomares MD   100 mg at 10/13/21 0920    isosorbide mononitrate (IMDUR) extended release tablet 30 mg  30 mg Oral Daily Silva Palomares MD   30 mg at 10/13/21 0925    losartan (COZAAR) tablet 25 mg  25 mg Oral Daily Lauren Hayes MD   25 mg at 10/13/21 0920    pantoprazole (PROTONIX) tablet 40 mg  40 mg Oral QAM AC Lauren Hayes MD   40 mg at 10/13/21 0513    pravastatin (PRAVACHOL) tablet 40 mg  40 mg Oral Nightly Lauren Hayes MD   40 mg at 10/12/21 2152    tiotropium-olodaterol (STIOLTO) 2.5-2.5 MCG/ACT inhaler 2 puff  2 puff Inhalation Daily Lauren Hayes MD   2 puff at 10/13/21 0834    QUEtiapine (SEROQUEL) tablet 25 mg  25 mg Oral Nightly Lauren Hayes MD   25 mg at 10/12/21 2152    glucose (GLUTOSE) 40 % oral gel 15 g  15 g Oral PRN Lauren Hayes MD   15 g at 10/13/21 0853    dextrose 50 % IV solution  12.5 g IntraVENous PRN Lauren Hayes MD   12.5 g at 10/13/21 0914    glucagon (rDNA) injection 1 mg  1 mg IntraMUSCular PRN Lauren Hayes MD        dextrose 5 % solution  100 mL/hr IntraVENous PRN Lauren Hayes MD        sodium chloride flush 0.9 % injection 10 mL  10 mL IntraVENous 2 times per day Lauren Hayes MD        sodium chloride flush 0.9 % injection 10 mL  10 mL IntraVENous PRN Lauren Hayes MD        0.9 % sodium chloride infusion  25 mL IntraVENous PRN Lauren Hayes MD        promethazine (PHENERGAN) tablet 12.5 mg  12.5 mg Oral Q6H PRN Lauren Hayes MD        Or    ondansetron [facility]) injection 4 mg  4 mg IntraVENous Q6H PRN Lauren Hayes MD   4 mg at 10/11/21 2033    senna (SENOKOT) tablet 8.6 mg  1 tablet Oral Daily PRN Lauren Hayes MD        bisacodyl (DULCOLAX) suppository 10 mg  10 mg Rectal Daily PRN Lauren Hayes MD        aspirin EC tablet 81 mg  81 mg Oral Daily Lauren Hayes MD   81 mg at 10/13/21 0920    Or    aspirin suppository 300 mg  300 mg Rectal Daily Lauren Hayes MD        labetalol (NORMODYNE;TRANDATE) injection 10 mg  10 mg IntraVENous Q10 Min PRN Lauren Hayes MD        insulin lispro (HUMALOG) injection vial 6 Units  0.05 Units/kg SubCUTAneous TID  Lauren Hayes MD       [employer] insulin lispro (HUMALOG) injection vial 0-12 Units  0-12 Units SubCUTAneous TID WC Rosa Hayes MD        insulin lispro (HUMALOG) injection vial 0-6 Units  0-6 Units SubCUTAneous Nightly Rosa Hayes MD   1 Units at 10/12/21 2041    sodium chloride flush 0.9 % injection 10 mL  10 mL IntraVENous 2 times per day Rosa Hayes MD   10 mL at 10/13/21 0925    sodium chloride flush 0.9 % injection 10 mL  10 mL IntraVENous PRN Rosa Hayes MD        0.9 % sodium chloride infusion  25 mL IntraVENous PRN Rosa Hayes MD        LORazepam (ATIVAN) tablet 1 mg  1 mg Oral Q1H PRN Rosa Hayes MD        Or    LORazepam (ATIVAN) injection 1 mg  1 mg IntraVENous Q1H PRN Rosa Hayes MD   1 mg at 10/11/21 2032    Or    LORazepam (ATIVAN) tablet 2 mg  2 mg Oral Q1H PRN Rosa Hayes MD        Or    LORazepam (ATIVAN) injection 2 mg  2 mg IntraVENous Q1H PRN Rosa Hayes MD   2 mg at 10/11/21 2239    thiamine (B-1) injection 100 mg  100 mg IntraVENous Daily Rosa Hayes MD   100 mg at 10/13/21 0920    therapeutic multivitamin-minerals 1 tablet  1 tablet Oral Daily Rosa Hayes MD   1 tablet at 10/13/21 0920    nicotine (NICODERM CQ) 14 MG/24HR 1 patch  1 patch TransDERmal Daily Rosa Hayes MD   1 patch at 10/13/21 0916    [Held by provider] heparin (porcine) injection 5,000 Units  5,000 Units SubCUTAneous 3 times per day Rosa Hayes MD   5,000 Units at 10/12/21 0551     Allergies   Allergen Reactions    Morphine Nausea And Vomiting       REVIEW OF SYSTEMS  10 systems reviewed, pertinent positives per HPI otherwise noted to be negative. PHYSICAL EXAM  /72   Pulse 70   Temp 96.7 °F (35.9 °C) (Oral)   Resp 20   Ht 5' 8\" (1.727 m)   Wt 268 lb 15.4 oz (122 kg)   SpO2 96%   BMI 40.90 kg/m²   GENERAL APPEARANCE: Awake and alert. Cooperative. No acute distress. HEAD: Normocephalic. Atraumatic. EYES:  EOM's grossly intact.   ENT: Mucous membranes are moist. NECK: Supple, trachea midline. HEART: RRR. Dialysis catheter left chest wall. LUNGS: Respirations unlabored. CTAB. Good air exchange. No wheezes, rales, or rhonchi. Speaking comfortably in full sentences. ABDOMEN: Soft. Non-distended. Non-tender. No guarding or rebound. EXTREMITIES: No peripheral edema. MAEE. No acute deformities. SKIN: Warm, dry and intact. No acute rashes. NEUROLOGICAL: Alert and oriented to self and place, not time. CN II-XII grossly intact. NIH stroke scale is 1 for some subjective sensory loss in the left arm and leg. Otherwise his strength appears intact. I did not assess gait. It is difficult to get him to cooperate with the physical exam.  PSYCHIATRIC: Normal mood and affect. LABS  I have reviewed all labs for this visit.    Results for orders placed or performed during the hospital encounter of 10/11/21   CBC Auto Differential   Result Value Ref Range    WBC 11.4 (H) 4.0 - 11.0 K/uL    RBC 3.82 (L) 4.20 - 5.90 M/uL    Hemoglobin 11.2 (L) 13.5 - 17.5 g/dL    Hematocrit 34.4 (L) 40.5 - 52.5 %    MCV 90.2 80.0 - 100.0 fL    MCH 29.3 26.0 - 34.0 pg    MCHC 32.5 31.0 - 36.0 g/dL    RDW 15.9 (H) 12.4 - 15.4 %    Platelets 358 266 - 281 K/uL    MPV 8.9 5.0 - 10.5 fL    Neutrophils % 80.3 %    Lymphocytes % 11.0 %    Monocytes % 4.9 %    Eosinophils % 3.5 %    Basophils % 0.3 %    Neutrophils Absolute 9.2 (H) 1.7 - 7.7 K/uL    Lymphocytes Absolute 1.3 1.0 - 5.1 K/uL    Monocytes Absolute 0.6 0.0 - 1.3 K/uL    Eosinophils Absolute 0.4 0.0 - 0.6 K/uL    Basophils Absolute 0.0 0.0 - 0.2 K/uL   Comprehensive Metabolic Panel w/ Reflex to MG   Result Value Ref Range    Sodium 140 136 - 145 mmol/L    Potassium reflex Magnesium 4.7 3.5 - 5.1 mmol/L    Chloride 103 99 - 110 mmol/L    CO2 22 21 - 32 mmol/L    Anion Gap 15 3 - 16    Glucose 167 (H) 70 - 99 mg/dL    BUN 76 (H) 7 - 20 mg/dL    CREATININE 8.3 (HH) 0.9 - 1.3 mg/dL    GFR Non-African American 7 (A) >60    GFR  8 (A) >60    Calcium 9.2 8.3 - 10.6 mg/dL    Total Protein 7.0 6.4 - 8.2 g/dL    Albumin 3.7 3.4 - 5.0 g/dL    Albumin/Globulin Ratio 1.1 1.1 - 2.2    Total Bilirubin <0.2 0.0 - 1.0 mg/dL    Alkaline Phosphatase 100 40 - 129 U/L    ALT 10 10 - 40 U/L    AST 13 (L) 15 - 37 U/L    Globulin 3.3 Not Established g/dL   Troponin   Result Value Ref Range    Troponin 0.14 (H) <0.01 ng/mL   Ethanol   Result Value Ref Range    Ethanol Lvl None Detected mg/dL   Ammonia   Result Value Ref Range    Ammonia 14 (L) 16 - 60 umol/L   Troponin   Result Value Ref Range    Troponin 0.13 (H) <0.01 ng/mL   Lactic acid, plasma   Result Value Ref Range    Lactic Acid 0.7 0.4 - 2.0 mmol/L   T4, Free   Result Value Ref Range    T4 Free 0.7 (L) 0.9 - 1.8 ng/dL   TSH without Reflex   Result Value Ref Range    TSH 0.89 0.27 - 4.20 uIU/mL   Protime-INR   Result Value Ref Range    Protime 11.1 9.9 - 12.7 sec    INR 0.98 0.88 - 1.12   APTT   Result Value Ref Range    aPTT 36.0 26.2 - 38.6 sec   Magnesium   Result Value Ref Range    Magnesium 2.30 1.80 - 2.40 mg/dL   Blood gas, venous   Result Value Ref Range    pH, Blade 7.594 (H) 7.350 - 7.450    pCO2, Blade 15.4 (L) 40.0 - 50.0 mmHg    pO2, Blade 183.3 (H) 25 - 40 mmHg    HCO3, Venous 14.6 (L) 23.0 - 29.0 mmol/L    Base Excess, Blade -4.9 (L) -3.0 - 3.0 mmol/L    O2 Sat, Blade 98 Not Established %    Carboxyhemoglobin 5.4 (H) 0.0 - 1.5 %    MetHgb, Blade 0.3 <1.5 %    TC02 (Calc), Blade 15 Not Established mmol/L    O2 Therapy Unknown    Hemoglobin A1C   Result Value Ref Range    Hemoglobin A1C 9.5 See comment %    eAG 226.0 mg/dL   Vitamin B12   Result Value Ref Range    Vitamin B-12 631 211 - 911 pg/mL   Calcium, ionized   Result Value Ref Range    Calcium, Ion 0.98 (L) 1.12 - 1.32 mmol/L    pH, Blade 7.344 (L) 7.350 - 7.450   Blood gas, venous   Result Value Ref Range    pH, Blade 7.344 (L) 7.350 - 7.450    pCO2, Blade 35.4 (L) 40.0 - 50.0 mmHg    pO2, Blade 207.9 (H) 25 - 40 mmHg    HCO3, Venous 18.8 (L) 23.0 - 29.0 mmol/L    Base Excess, Blade -6.1 (L) -3.0 - 3.0 mmol/L    O2 Sat, Blade 98 Not Established %    Carboxyhemoglobin 3.0 (H) 0.0 - 1.5 %    MetHgb, Blade 0.4 <1.5 %    TC02 (Calc), Blade 20 Not Established mmol/L    O2 Therapy Unknown    Salicylate   Result Value Ref Range    Salicylate, Serum <1.1 (L) 15.0 - 30.0 mg/dL   Calcium, Ionized   Result Value Ref Range    Calcium, Ion 1.11 (L) 1.12 - 1.32 mmol/L    pH, Blade 7.246 (L) 7.350 - 7.450   CBC auto differential   Result Value Ref Range    WBC 18.5 (H) 4.0 - 11.0 K/uL    RBC 3.87 (L) 4.20 - 5.90 M/uL    Hemoglobin 11.3 (L) 13.5 - 17.5 g/dL    Hematocrit 35.1 (L) 40.5 - 52.5 %    MCV 90.8 80.0 - 100.0 fL    MCH 29.1 26.0 - 34.0 pg    MCHC 32.1 31.0 - 36.0 g/dL    RDW 15.7 (H) 12.4 - 15.4 %    Platelets 772 602 - 258 K/uL    MPV 7.9 5.0 - 10.5 fL    Neutrophils % 89.0 %    Lymphocytes % 4.8 %    Monocytes % 4.1 %    Eosinophils % 1.7 %    Basophils % 0.4 %    Neutrophils Absolute 16.5 (H) 1.7 - 7.7 K/uL    Lymphocytes Absolute 0.9 (L) 1.0 - 5.1 K/uL    Monocytes Absolute 0.8 0.0 - 1.3 K/uL    Eosinophils Absolute 0.3 0.0 - 0.6 K/uL    Basophils Absolute 0.1 0.0 - 0.2 K/uL   Comprehensive Metabolic Panel w/ Reflex to MG   Result Value Ref Range    Sodium 143 136 - 145 mmol/L    Potassium reflex Magnesium 5.1 3.5 - 5.1 mmol/L    Chloride 105 99 - 110 mmol/L    CO2 22 21 - 32 mmol/L    Anion Gap 16 3 - 16    Glucose 78 70 - 99 mg/dL    BUN 79 (H) 7 - 20 mg/dL    CREATININE 8.1 (HH) 0.9 - 1.3 mg/dL    GFR Non-African American 7 (A) >60    GFR  8 (A) >60    Calcium 9.4 8.3 - 10.6 mg/dL    Total Protein 6.9 6.4 - 8.2 g/dL    Albumin 3.9 3.4 - 5.0 g/dL    Albumin/Globulin Ratio 1.3 1.1 - 2.2    Total Bilirubin <0.2 0.0 - 1.0 mg/dL    Alkaline Phosphatase 100 40 - 129 U/L    ALT 9 (L) 10 - 40 U/L    AST 9 (L) 15 - 37 U/L    Globulin 3.0 Not Established g/dL   Troponin   Result Value Ref Range    Troponin 0.15 (H) <0.01 ng/mL   Troponin   Result Value Ref Range Troponin 0.15 (H) <0.01 ng/mL   Troponin   Result Value Ref Range    Troponin 0.16 (H) <0.01 ng/mL   Troponin   Result Value Ref Range    Troponin 0.17 (H) <0.01 ng/mL   Hemoglobin A1c   Result Value Ref Range    Hemoglobin A1C 9.3 See comment %    eAG 220.2 mg/dL   Lipase   Result Value Ref Range    Lipase 28.0 13.0 - 60.0 U/L   Lipid panel - fasting   Result Value Ref Range    Cholesterol, Total 208 (H) 0 - 199 mg/dL    Triglycerides 187 (H) 0 - 150 mg/dL    HDL 43 40 - 60 mg/dL    LDL Calculated 128 (H) <100 mg/dL    VLDL Cholesterol Calculated 37 Not Established mg/dL   Troponin   Result Value Ref Range    Troponin 0.17 (H) <0.01 ng/mL   Troponin   Result Value Ref Range    Troponin 0.18 (H) <0.01 ng/mL   Troponin   Result Value Ref Range    Troponin 0.17 (H) <0.01 ng/mL   Troponin   Result Value Ref Range    Troponin 0.17 (H) <0.01 ng/mL   Troponin   Result Value Ref Range    Troponin 0.18 (H) <0.01 ng/mL   Magnesium   Result Value Ref Range    Magnesium 2.20 1.80 - 2.40 mg/dL   CBC   Result Value Ref Range    WBC 12.1 (H) 4.0 - 11.0 K/uL    RBC 3.59 (L) 4.20 - 5.90 M/uL    Hemoglobin 10.6 (L) 13.5 - 17.5 g/dL    Hematocrit 32.2 (L) 40.5 - 52.5 %    MCV 89.7 80.0 - 100.0 fL    MCH 29.7 26.0 - 34.0 pg    MCHC 33.1 31.0 - 36.0 g/dL    RDW 15.6 (H) 12.4 - 15.4 %    Platelets 583 419 - 781 K/uL    MPV 8.3 5.0 - 10.5 fL   Renal Function Panel   Result Value Ref Range    Sodium 140 136 - 145 mmol/L    Potassium 3.9 3.5 - 5.1 mmol/L    Chloride 103 99 - 110 mmol/L    CO2 20 (L) 21 - 32 mmol/L    Anion Gap 17 (H) 3 - 16    Glucose 52 (L) 70 - 99 mg/dL    BUN 67 (H) 7 - 20 mg/dL    CREATININE 7.2 (HH) 0.9 - 1.3 mg/dL    GFR Non-African American 8 (A) >60    GFR  10 (A) >60    Calcium 8.8 8.3 - 10.6 mg/dL    Phosphorus 7.2 (H) 2.5 - 4.9 mg/dL    Albumin 3.2 (L) 3.4 - 5.0 g/dL   Hemoglobin and hematocrit, blood   Result Value Ref Range    Hemoglobin 11.1 (L) 13.5 - 17.5 g/dL    Hematocrit 33.9 (L) 40.5 - 52.5 %   Troponin   Result Value Ref Range    Troponin 0.18 (H) <0.01 ng/mL   POCT Glucose   Result Value Ref Range    POC Glucose 116 (H) 70 - 99 mg/dl    Performed on ACCU-CHEK    POCT Glucose   Result Value Ref Range    POC Glucose 90 70 - 99 mg/dl    Performed on ACCU-CHEK    POCT Glucose   Result Value Ref Range    POC Glucose 93 70 - 99 mg/dl    Performed on ACCU-CHEK    POCT Glucose   Result Value Ref Range    POC Glucose 100 (H) 70 - 99 mg/dl    Performed on ACCU-CHEK    POCT Glucose   Result Value Ref Range    POC Glucose 107 (H) 70 - 99 mg/dl    Performed on ACCU-CHEK    POCT Glucose   Result Value Ref Range    POC Glucose 142 (H) 70 - 99 mg/dl    Performed on ACCU-CHEK    POCT Glucose   Result Value Ref Range    POC Glucose 58 (L) 70 - 99 mg/dl    Performed on ACCU-CHEK    POCT Glucose   Result Value Ref Range    POC Glucose 31 (LL) 70 - 99 mg/dl    Performed on ACCU-CHEK    POCT Glucose   Result Value Ref Range    POC Glucose 56 (L) 70 - 99 mg/dl    Performed on ACCU-CHEK    POCT Glucose   Result Value Ref Range    POC Glucose 121 (H) 70 - 99 mg/dl    Performed on ACCU-CHEK    POCT Glucose   Result Value Ref Range    POC Glucose 98 70 - 99 mg/dl    Performed on ACCU-CHEK    EKG 12 Lead   Result Value Ref Range    Ventricular Rate 91 BPM    Atrial Rate 91 BPM    P-R Interval 186 ms    QRS Duration 92 ms    Q-T Interval 376 ms    QTc Calculation (Bazett) 462 ms    P Axis 72 degrees    R Axis -13 degrees    T Axis 97 degrees    Diagnosis       Normal sinus rhythmNonspecific T wave abnormalityAbnormal ECGWhen compared with ECG of 07-SEP-2021 12:51,No significant change was foundConfirmed by NANY BARCLAY MD ([id number]) on 10/11/2021 4:36:34 PM       EKG  The Ekg interpreted by myself  normal sinus rhythm with a rate of 91  Axis is   Normal  QTc is  prolonged  Intervals and Durations are unremarkable. No specific ST-T wave changes appreciated. No evidence of acute ischemia.    No significant change from prior EKG dated 9/7/21    Cardiac Monitoring: The cardiac monitor revealed  normal sinus rhythm as interpreted by me. The cardiac monitor was ordered secondary to the patient's complaint of altered mental status and to monitor the patient for dysrhythmia. RADIOLOGY  X-RAYS:  I have reviewed radiologic plain film image(s). ALL OTHER NON-PLAIN FILM IMAGES SUCH AS CT, ULTRASOUND AND MRI HAVE BEEN READ BY THE RADIOLOGIST.  MRI brain without contrast   Final Result   1. Patient motion limits evaluation. 2. Small acute to subacute infarct involving the left frontal lobe   periventricular white matter extending along the left thalamus. No mass   effect or midline shift. 3. Otherwise, no acute intracranial abnormality. 4. Chronic lacunar infarcts involving the right thalamus and bilateral   cerebellar hemispheres. 5. Mild global parenchymal volume loss with chronic microvascular ischemic   changes. These results were sent to the [email or web address] [id number] ([address] [place] [address]) on   10/13/2021 at 11:55 am to be communicated to the referring/covering health   care provider/office. CT HEAD WO CONTRAST   Final Result   Small nonhemorrhagic lacunar infarction, anterior left thalamus new from the   prior study. This is likely recent given the density. Multiple old lacunar infarctions in the basal ganglia unchanged. XR CHEST PORTABLE   Final Result   Mild cardiomegaly and vascular congestion. Rechecks: Physical assessment performed. Patient is resting comfortably. He has been updated on his CT findings and plan for admission. Critical Care:I personally spent a total of 40  minutes of critical care time in obtaining history, performing a physical exam, bedside monitoring of interventions, collecting and interpreting tests and discussion with consultants but excluding time spent performing procedures, treating other patients and teaching time. ED COURSE/MDM  Patient seen and evaluated. Here the patient is afebrile with normal vitals signs. Old records reviewed, including details of his most recent admission where he was noted to have a right-sided CVA. Here today he has some mild subjective left-sided arm and leg numbness but otherwise his neurologic exam is intact. He is oriented to self and place only. He does not appear to be hallucinating currently. He is able to answer most of my questions. Lab work here is consistent with his end-stage renal disease. Troponin is slightly elevated but flat when compared to prior. CT of the head does show what appears to be new compared to prior CTs of a left lacunar infarct. The patient does state he has been having his current symptoms for over a week. I do not think this is anything acute. He is not a TPA candidate. A stroke alert was not activated given the timeframe. I will however admit him to the hospitalist for further work-up. .  Labs and imaging reviewed and results discussed with patient. Patient was reassessed as noted above . Plan of care discussed with patient. Patient in agreement with plan. CLINICAL IMPRESSION  1. Cerebrovascular accident (CVA) due to other mechanism ([address]    2. ESRD (end stage renal disease) on dialysis ([address]    3. Delirium        Blood pressure 119/72, pulse 70, temperature 96.7 °F (35.9 °C), temperature source Oral, resp. rate 20, height 5' 8\" (1.727 m), weight 268 lb 15.4 oz (122 kg), SpO2 96 %. DISPOSITION  Shiraz Garcia was admitted in stable condition.     (Please note this note was completed with a voice recognition program.  Efforts were made to edit the dictations but occasionally words are mis-transcribed.)       Rishi Justin MD  10/13/21 0462

## 2021-10-12 ENCOUNTER — TELEPHONE (OUTPATIENT)
Dept: FAMILY MEDICINE CLINIC | Age: 51
End: 2021-10-12
Payer: COMMERCIAL

## 2021-10-12 DIAGNOSIS — J43.9 PULMONARY EMPHYSEMA, UNSPECIFIED EMPHYSEMA TYPE (HCC): Primary | ICD-10-CM

## 2021-10-12 LAB
A/G RATIO: 1.3 (ref 1.1–2.2)
ALBUMIN SERPL-MCNC: 3.9 G/DL (ref 3.4–5)
ALP BLD-CCNC: 100 U/L (ref 40–129)
ALT SERPL-CCNC: 9 U/L (ref 10–40)
ANION GAP SERPL CALCULATED.3IONS-SCNC: 16 MMOL/L (ref 3–16)
AST SERPL-CCNC: 9 U/L (ref 15–37)
BASOPHILS ABSOLUTE: 0.1 K/UL (ref 0–0.2)
BASOPHILS RELATIVE PERCENT: 0.4 %
BILIRUB SERPL-MCNC: <0.2 MG/DL (ref 0–1)
BUN BLDV-MCNC: 79 MG/DL (ref 7–20)
CALCIUM IONIZED: 1.11 MMOL/L (ref 1.12–1.32)
CALCIUM SERPL-MCNC: 9.4 MG/DL (ref 8.3–10.6)
CHLORIDE BLD-SCNC: 105 MMOL/L (ref 99–110)
CHOLESTEROL, TOTAL: 208 MG/DL (ref 0–199)
CO2: 22 MMOL/L (ref 21–32)
CREAT SERPL-MCNC: 8.1 MG/DL (ref 0.9–1.3)
EOSINOPHILS ABSOLUTE: 0.3 K/UL (ref 0–0.6)
EOSINOPHILS RELATIVE PERCENT: 1.7 %
ESTIMATED AVERAGE GLUCOSE: 220.2 MG/DL
ESTIMATED AVERAGE GLUCOSE: 226 MG/DL
GFR AFRICAN AMERICAN: 8
GFR NON-AFRICAN AMERICAN: 7
GLOBULIN: 3 G/DL
GLUCOSE BLD-MCNC: 100 MG/DL (ref 70–99)
GLUCOSE BLD-MCNC: 107 MG/DL (ref 70–99)
GLUCOSE BLD-MCNC: 142 MG/DL (ref 70–99)
GLUCOSE BLD-MCNC: 78 MG/DL (ref 70–99)
GLUCOSE BLD-MCNC: 90 MG/DL (ref 70–99)
GLUCOSE BLD-MCNC: 93 MG/DL (ref 70–99)
HBA1C MFR BLD: 9.3 %
HBA1C MFR BLD: 9.5 %
HCT VFR BLD CALC: 33.9 % (ref 40.5–52.5)
HCT VFR BLD CALC: 35.1 % (ref 40.5–52.5)
HDLC SERPL-MCNC: 43 MG/DL (ref 40–60)
HEMOGLOBIN: 11.1 G/DL (ref 13.5–17.5)
HEMOGLOBIN: 11.3 G/DL (ref 13.5–17.5)
LDL CHOLESTEROL CALCULATED: 128 MG/DL
LIPASE: 28 U/L (ref 13–60)
LYMPHOCYTES ABSOLUTE: 0.9 K/UL (ref 1–5.1)
LYMPHOCYTES RELATIVE PERCENT: 4.8 %
MCH RBC QN AUTO: 29.1 PG (ref 26–34)
MCHC RBC AUTO-ENTMCNC: 32.1 G/DL (ref 31–36)
MCV RBC AUTO: 90.8 FL (ref 80–100)
MONOCYTES ABSOLUTE: 0.8 K/UL (ref 0–1.3)
MONOCYTES RELATIVE PERCENT: 4.1 %
NEUTROPHILS ABSOLUTE: 16.5 K/UL (ref 1.7–7.7)
NEUTROPHILS RELATIVE PERCENT: 89 %
PDW BLD-RTO: 15.7 % (ref 12.4–15.4)
PERFORMED ON: ABNORMAL
PERFORMED ON: NORMAL
PERFORMED ON: NORMAL
PH VENOUS: 7.25 (ref 7.35–7.45)
PLATELET # BLD: 247 K/UL (ref 135–450)
PMV BLD AUTO: 7.9 FL (ref 5–10.5)
POTASSIUM REFLEX MAGNESIUM: 5.1 MMOL/L (ref 3.5–5.1)
RBC # BLD: 3.87 M/UL (ref 4.2–5.9)
SODIUM BLD-SCNC: 143 MMOL/L (ref 136–145)
T4 FREE: 0.7 NG/DL (ref 0.9–1.8)
TOTAL PROTEIN: 6.9 G/DL (ref 6.4–8.2)
TRIGL SERPL-MCNC: 187 MG/DL (ref 0–150)
TROPONIN: 0.15 NG/ML
TROPONIN: 0.15 NG/ML
TROPONIN: 0.16 NG/ML
TROPONIN: 0.17 NG/ML
TROPONIN: 0.18 NG/ML
TSH SERPL DL<=0.05 MIU/L-ACNC: 0.89 UIU/ML (ref 0.27–4.2)
VITAMIN B-12: 631 PG/ML (ref 211–911)
VLDLC SERPL CALC-MCNC: 37 MG/DL
WBC # BLD: 18.5 K/UL (ref 4–11)

## 2021-10-12 PROCEDURE — 99223 1ST HOSP IP/OBS HIGH 75: CPT | Performed by: NURSE PRACTITIONER

## 2021-10-12 PROCEDURE — 85014 HEMATOCRIT: CPT

## 2021-10-12 PROCEDURE — 97162 PT EVAL MOD COMPLEX 30 MIN: CPT

## 2021-10-12 PROCEDURE — 5A1D70Z PERFORMANCE OF URINARY FILTRATION, INTERMITTENT, LESS THAN 6 HOURS PER DAY: ICD-10-PCS | Performed by: INTERNAL MEDICINE

## 2021-10-12 PROCEDURE — 94660 CPAP INITIATION&MGMT: CPT

## 2021-10-12 PROCEDURE — 80053 COMPREHEN METABOLIC PANEL: CPT

## 2021-10-12 PROCEDURE — 97116 GAIT TRAINING THERAPY: CPT

## 2021-10-12 PROCEDURE — 6370000000 HC RX 637 (ALT 250 FOR IP): Performed by: HOSPITALIST

## 2021-10-12 PROCEDURE — 84484 ASSAY OF TROPONIN QUANT: CPT

## 2021-10-12 PROCEDURE — 83690 ASSAY OF LIPASE: CPT

## 2021-10-12 PROCEDURE — 6360000002 HC RX W HCPCS: Performed by: HOSPITALIST

## 2021-10-12 PROCEDURE — 97535 SELF CARE MNGMENT TRAINING: CPT

## 2021-10-12 PROCEDURE — 80061 LIPID PANEL: CPT

## 2021-10-12 PROCEDURE — 2700000000 HC OXYGEN THERAPY PER DAY

## 2021-10-12 PROCEDURE — 82330 ASSAY OF CALCIUM: CPT

## 2021-10-12 PROCEDURE — 83036 HEMOGLOBIN GLYCOSYLATED A1C: CPT

## 2021-10-12 PROCEDURE — 87040 BLOOD CULTURE FOR BACTERIA: CPT

## 2021-10-12 PROCEDURE — 97166 OT EVAL MOD COMPLEX 45 MIN: CPT

## 2021-10-12 PROCEDURE — 92523 SPEECH SOUND LANG COMPREHEN: CPT

## 2021-10-12 PROCEDURE — 92610 EVALUATE SWALLOWING FUNCTION: CPT

## 2021-10-12 PROCEDURE — 2580000003 HC RX 258: Performed by: HOSPITALIST

## 2021-10-12 PROCEDURE — 97530 THERAPEUTIC ACTIVITIES: CPT

## 2021-10-12 PROCEDURE — 85018 HEMOGLOBIN: CPT

## 2021-10-12 PROCEDURE — 94761 N-INVAS EAR/PLS OXIMETRY MLT: CPT

## 2021-10-12 PROCEDURE — 85025 COMPLETE CBC W/AUTO DIFF WBC: CPT

## 2021-10-12 PROCEDURE — 2500000003 HC RX 250 WO HCPCS: Performed by: HOSPITALIST

## 2021-10-12 PROCEDURE — 1200000000 HC SEMI PRIVATE

## 2021-10-12 PROCEDURE — 82607 VITAMIN B-12: CPT

## 2021-10-12 PROCEDURE — 36415 COLL VENOUS BLD VENIPUNCTURE: CPT

## 2021-10-12 RX ORDER — TORSEMIDE 100 MG/1
100 TABLET ORAL DAILY
Status: DISCONTINUED | OUTPATIENT
Start: 2021-10-12 | End: 2021-10-15

## 2021-10-12 RX ORDER — CALCIUM GLUCONATE 20 MG/ML
1000 INJECTION, SOLUTION INTRAVENOUS ONCE
Status: COMPLETED | OUTPATIENT
Start: 2021-10-12 | End: 2021-10-12

## 2021-10-12 RX ADMIN — HEPARIN SODIUM 5000 UNITS: 5000 INJECTION INTRAVENOUS; SUBCUTANEOUS at 05:51

## 2021-10-12 RX ADMIN — SODIUM CHLORIDE, PRESERVATIVE FREE 10 ML: 5 INJECTION INTRAVENOUS at 11:32

## 2021-10-12 RX ADMIN — CARVEDILOL 12.5 MG: 3.12 TABLET, FILM COATED ORAL at 21:50

## 2021-10-12 RX ADMIN — THIAMINE HYDROCHLORIDE 100 MG: 100 INJECTION, SOLUTION INTRAMUSCULAR; INTRAVENOUS at 11:30

## 2021-10-12 RX ADMIN — PRAVASTATIN SODIUM 40 MG: 40 TABLET ORAL at 21:52

## 2021-10-12 RX ADMIN — DULOXETINE HYDROCHLORIDE 60 MG: 60 CAPSULE, DELAYED RELEASE ORAL at 11:27

## 2021-10-12 RX ADMIN — ISOSORBIDE MONONITRATE 30 MG: 30 TABLET, EXTENDED RELEASE ORAL at 11:27

## 2021-10-12 RX ADMIN — CALCIUM GLUCONATE 1000 MG: 20 INJECTION, SOLUTION INTRAVENOUS at 05:55

## 2021-10-12 RX ADMIN — INSULIN GLARGINE 30 UNITS: 100 INJECTION, SOLUTION SUBCUTANEOUS at 11:31

## 2021-10-12 RX ADMIN — Medication 1 TABLET: at 11:29

## 2021-10-12 RX ADMIN — QUETIAPINE FUMARATE 25 MG: 25 TABLET ORAL at 21:52

## 2021-10-12 RX ADMIN — INSULIN LISPRO 1 UNITS: 100 INJECTION, SOLUTION INTRAVENOUS; SUBCUTANEOUS at 20:41

## 2021-10-12 RX ADMIN — ASPIRIN 81 MG: 81 TABLET, COATED ORAL at 11:30

## 2021-10-12 RX ADMIN — CALCIUM ACETATE 667 MG: 667 CAPSULE ORAL at 18:05

## 2021-10-12 RX ADMIN — LOSARTAN POTASSIUM 25 MG: 25 TABLET, FILM COATED ORAL at 11:27

## 2021-10-12 RX ADMIN — INSULIN GLARGINE 30 UNITS: 100 INJECTION, SOLUTION SUBCUTANEOUS at 20:43

## 2021-10-12 RX ADMIN — GABAPENTIN 100 MG: 100 CAPSULE ORAL at 11:29

## 2021-10-12 RX ADMIN — TORSEMIDE 100 MG: 100 TABLET ORAL at 11:30

## 2021-10-12 RX ADMIN — CARVEDILOL 12.5 MG: 3.12 TABLET, FILM COATED ORAL at 11:28

## 2021-10-12 RX ADMIN — GABAPENTIN 100 MG: 100 CAPSULE ORAL at 21:52

## 2021-10-12 ASSESSMENT — PAIN DESCRIPTION - ORIENTATION: ORIENTATION: LOWER

## 2021-10-12 ASSESSMENT — PAIN DESCRIPTION - DESCRIPTORS: DESCRIPTORS: ACHING

## 2021-10-12 ASSESSMENT — PAIN DESCRIPTION - PAIN TYPE: TYPE: CHRONIC PAIN

## 2021-10-12 ASSESSMENT — PAIN DESCRIPTION - LOCATION: LOCATION: BACK

## 2021-10-12 ASSESSMENT — PAIN SCALES - GENERAL
PAINLEVEL_OUTOF10: 0
PAINLEVEL_OUTOF10: 8
PAINLEVEL_OUTOF10: 0
PAINLEVEL_OUTOF10: 6

## 2021-10-12 ASSESSMENT — PAIN DESCRIPTION - FREQUENCY: FREQUENCY: CONTINUOUS

## 2021-10-12 NOTE — PROGRESS NOTES
Speech Language Pathology  Facility/Department: [facility] C5 - MED SURG/ORTHO   CLINICAL BEDSIDE SWALLOW EVALUATION      Recommended Diet and Intervention  Diet Solids Recommendation: Regular  Liquid Consistency Recommendation: Thin  Recommended Form of Meds: PO  *Separate speech/lang/cog evaluation completed this date -- see separate eval note. Pt presents with moderate-severe cognitive deficits      NAME: Katia Cardoza  : 1970  MRN: [id number]    ADMISSION DATE: 10/11/2021  ADMITTING DIAGNOSIS: has Acute respiratory failure with hypoxia and hypercapnia ([address] Chronic obstructive pulmonary disease (HCC); CAD S/P percutaneous coronary angioplasty; PVD (peripheral vascular disease) ([address] Nonischemic cardiomyopathy ([address] Sleep apnea; Bicuspid aortic valve; Bilateral hilar adenopathy syndrome; Claudication in peripheral vascular disease ([address] Essential hypertension; Diabetic neuropathy ([address] Type 2 diabetes, uncontrolled, with neuropathy ([address] Passive smoke exposure; Depression with anxiety; Hematoma; Pneumonia of right upper lobe due to infectious organism; DM (diabetes mellitus), secondary, uncontrolled, w/neurologic complic ([address] Hypertensive heart disease with congestive heart failure with preserved left ventricular function ([address] CHF exacerbation ([address] Chest pain; Coronary artery disease involving native coronary artery of native heart without angina pectoris; Obesity (BMI 30-39.9); ZAINAB on CPAP; Degeneration of lumbar or lumbosacral intervertebral disc; Lumbar radiculopathy; Lumbosacral spondylosis without myelopathy; Biliary colic; Symptomatic cholelithiasis; Gastroparesis due to DM ([address] Angina, class IV ([address] Dyspnea; Elevated brain natriuretic peptide (BNP) level; Dyslipidemia; Respiratory distress; Hypoxia; Chest pressure; Hypertensive urgency; Acute on chronic combined systolic and diastolic heart failure ([address] Ischemic cardiomyopathy; Chronic renal failure, stage 5 ([address]  Tobacco abuse; CKD stage 4 due to type 2 diabetes mellitus ([facility] CVA (cerebral vascular accident) ([facility] Arterial ischemic stroke, ICA, right, acute ([address] Type 2 diabetes mellitus without complication, without long-term current use of insulin (HCC); HTN (hypertension), benign; ZAINAB (obstructive sleep apnea); Diarrhea; Pleural effusion; Chronic anemia; Nonrheumatic aortic valve stenosis; Hypervolemia; Hyperkalemia; Obesity; Mucus plugging of bronchi; Hemodialysis-associated hypotension; Left arm pain; Dizziness; ESRD (end stage renal disease) on dialysis ([address] Hypotension due to drugs; Acute diastolic CHF (congestive heart failure) ([facility] Neuromuscular disorder ([facility] Acute combined systolic and diastolic CHF, NYHA class 4 ([facility] Renovascular hypertension; Mixed hyperlipidemia; Cigarette nicotine dependence in remission; Acute respiratory failure ([facility] Pulmonary edema; Fluid overload; Diastolic dysfunction; Anemia of chronic disease; SOB (shortness of breath); Steal syndrome of dialysis vascular access ([facility] Chronic, continuous use of opioids; Chronic bronchitis ([facility] Nasal congestion; Hypercholesteremia; Bradycardia; S/P TAVR (transcatheter aortic valve replacement); Syncope and collapse; Atrial fibrillation ([address] Former smoker; Generalized weakness; DKA, type 1, not at goal [facility]); Bilateral leg weakness; GBS (Guillain-Barry syndrome) ([facility] Sinus pause; Weakness of both lower extremities; Sinus bradycardia; Ataxia; Peripheral vascular occlusive disease ([address] Cellulitis of right foot; Iliac artery occlusion, right ([facility] Cellulitis and abscess of hand; Type 2 diabetes mellitus with hyperglycemia ([facility] Acute encephalopathy; Acute hypoxemic respiratory failure (HCC); CHF (congestive heart failure) ([address] Acute cerebrovascular accident (CVA) ([facility] Speech problem; Left face and left arm tingling; Urinary tract infection with hematuria;  Acute CVA (cerebrovascular accident) ([address] and Alcohol abuse, daily use on their problem list.  ONSET DATE: Pt admitted to [facility] on 10/11/21    Recent Chest Xray (10/11/21): Impression   Mild cardiomegaly and vascular congestion. Date of Eval: 10/12/2021  Evaluating Therapist: JOSE Mahan    Current Diet level:  Current Diet : NPO (Pending BSE)  Current Liquid Diet : NPO (Pending BSE)      Primary Complaint  Patient Complaint: Per MD H&P, \"Igor Crawford is a 46 y.o. male who presented to the ED for altered mental status, hallucinations, and garbled speech per report of family members. Patient is an extremely poor historian therefore HPI is limited. He has multiple comorbidities including uncontrolled type II DM, decompensated CHF, ESRD on HD, ZAINAB/COPD with tobacco abuse, EtOH daily abuse, and ESRD on HD (M/W/F; skipped today). He was admitted less than 1 month earlier for right cerebellar infarct, and was sent home with Srinivasan Morel.     Upon arrival to the ED head CT was obtained and notable for acute anterior left thalamic lacunar infarction. CXR revealed mild cardiomegaly with vascular congestion. Notable labs include BUN/CR of 76/8.3, glucose 167, troponin 0.14, and VBG pH 7.59 PCO2 15.4/PO2 183. 3. Hospitalist consulted to admit this patient for recurrent acute CVA as well as acutely decompensated comorbidities noted above\". Pain:  Pain Assessment  Pain Assessment: 0-10  Pain Level: 0  Patient's Stated Pain Goal: No pain  Pain Type: Chronic pain  Pain Location: Back  Pain Orientation: Lower  Pain Descriptors: Aching  Pain Frequency: Continuous  Non-Pharmaceutical Pain Intervention(s): Ambulation/Increased Activity, Repositioned, Emotional support    Reason for Referral  Abby Gifford was referred for a bedside swallow evaluation to assess the efficiency of his swallow function, identify signs and symptoms of aspiration and make recommendations regarding safe dietary consistencies, effective compensatory strategies, and safe eating environment.     Impression  Dysphagia Diagnosis: Swallow function appears grossly intact  Dysphagia Outcome Severity Scale: Level 6: Within functional limits/Modified independence   Pt seen upright in chair, alert and agreeable to evaluation, pleasantly confused (separate speech/lang/cog evaluation completed this date -- see note). RN OK'd SLP entry and evaluation. Pt denied hx of dysphagia, eager for PO intake. Pt observed with consecutive thin liquid trials via cup and straw, puree, and regular solid trials. He required intermittent cues for small bites/sips d/t impulsivity. Grossly timely swallow initiation throughout, no overt s/s of aspiration/penetration -- no coughing, throat clearing, wet vocal quality, change in RR, O2 sats, or increased WOB observed. Pt denied globus sensation post-swallow with all PO. Adequate rotary chew pattern noted with regular solids, no oral / lingual residue post-swallow. Swallow function appears grossly WFL. Recommend initiating Regular solid diet with thin liquids, meds whole with TL.  RN aware of recs. Vitals/labs:   Temp: n/a  SpO2: 96%  RR: 16-20/min  BP: 144/86  HR: 89  WBC: 18.5      Treatment Plan  Requires SLP Intervention: Yes  Duration/Frequency of Treatment: 2-4x/week for LOS  D/C Recommendations: To be determined  Referral To: Speech Evaluation    Recommended Diet and Intervention  Diet Solids Recommendation: Regular  Liquid Consistency Recommendation: Thin  Recommended Form of Meds: PO  Recommendations: Dysphagia treatment  Therapeutic Interventions: Patient/Family education;Diet tolerance monitoring    Compensatory Swallowing Strategies  Compensatory Swallowing Strategies: Alternate solids and liquids;Eat/Feed slowly;Upright as possible for all oral intake;Remain upright for 30-45 minutes after meals;Small bites/sips; External pacing    Treatment/Goals  Short-term Goals  Timeframe for Short-term Goals: 5 days (10/17/21)  Long-term Goals  Timeframe for Long-term Goals: 7 days (10/19/21)  Goal 1: The pt will tolerate safest and least restrictive diet without clinical s/s of aspiration. Dysphagia Goals: The patient will tolerate recommended diet without observed clinical signs of aspiration; The patient/caregiver will demonstrate understanding of compensatory strategies for improved swallowing safety. General  Chart Reviewed: Yes  Comments: Pt admitted d/t delirium, CVA. Pt has PMHx of COPD, CHF, PNA, GERD, CVA ,ESRD, emphysema, and sleep apnea per chart. Behavior/Cognition: Alert; Cooperative;Confused; Impulsive;Distractible  Respiratory Status: Room air  O2 Device: None (Room air)  Communication Observation: Functional (Confused)  Follows Directions: Simple  Dentition: Adequate  Patient Positioning: Upright in chair  Baseline Vocal Quality: Normal  Volitional Cough: Strong  Prior Dysphagia History: Pt had BSE completed on 9/8/21 with recommendations for a Regular solid diet with thin liquids and no further f/u was indicated at that time. Pt also had MBSS completed in May 2017 with recommendations for a Regular solid diet with thin liquids, avoid/separate mixed consistencies. Consistencies Administered: Reg solid; Dysphagia Pureed (Dysphagia I); Thin - cup; Thin - straw    Vision/Hearing  Vision  Vision: Impaired  Vision Exceptions: Wears glasses for reading  Hearing  Hearing: Exceptions to [facility]  Hearing Exceptions: Hard of hearing/hearing concerns    Oral Motor Deficits  Oral/Motor  Oral Motor: Within functional limits    Oral Phase Dysfunction  Oral Phase  Oral Phase: WFL  Oral Phase  Oral Phase - Comment: Pt impulsive with bite size and required intermittent cues for small, single bites / sips. Indicators of Pharyngeal Phase Dysfunction  Pharyngeal phase of swallow appears grossly WNL. No pharyngeal deficits noted during evaluation. Laryngeal elevation appears WFL based upon palpation of anterior neck during swallow. Vocal quality dry before and after po trials.   O2 sat 96% before and after po trials. Prognosis  Prognosis  Prognosis for safe diet advancement: good  Barriers to reach goals: cognitive deficits  Individuals consulted  Consulted and agree with results and recommendations: Patient;RN    Education  Patient Education:Pt educated on reason for referral, role of ST, assessment results and recommendations. Patient Education Response: Needs reinforcement; No evidence of learning;Verbalizes understanding  Safety Devices in place: Yes  Type of devices: Call light within reach;Nurse notified; Left in chair;Chair alarm in place       Therapy Time  SLP Individual Minutes  Time In: 8090  Time Out: 1030  Minutes: 15; dysphagia lydia Khalil M.S. Gloria Dumont  Speech-language pathologist  [id number]  Speech Desk Phone: [phone]

## 2021-10-12 NOTE — H&P
HOSPITALISTS HISTORY AND PHYSICAL    10/11/2021 11:35 PM    Patient Information:  Binh Wang is a 46 y.o. male [id number]  PCP:  Len Escobedo MD (Tel: [phone] )    Chief complaint:    Chief Complaint   Patient presents with    Altered Mental Status     confused yesterday, wife called 46, pt sts\" i cant get enough sleep\"        History of Present Illness:  Colin Patterson is a 46 y.o. male who presented to the ED for altered mental status, hallucinations, and garbled speech per report of family members. Patient is an extremely poor historian therefore HPI is limited. He has multiple comorbidities including uncontrolled type II DM, decompensated CHF, ESRD on HD, ZAINAB/COPD with tobacco abuse, EtOH daily abuse, and ESRD on HD (M/W/F; skipped today). He was admitted less than 1 month earlier for right cerebellar infarct, and was sent home with [facility]. Upon arrival to the ED head CT was obtained and notable for acute anterior left thalamic lacunar infarction. CXR revealed mild cardiomegaly with vascular congestion. Notable labs include BUN/CR of 76/8.3, glucose 167, troponin 0.14, and VBG pH 7.59 PCO2 15.4/PO2 183. 3. Hospitalist consulted to admit this patient for recurrent acute CVA as well as acutely decompensated comorbidities noted above. History obtained from patient and review of Epic chart. Medical records revealed that his most recent echo was performed on 9/11/2012 with the following results:     Technically difficult examination. Image quality limits accurate wall motion   and ejection fraction analysis. The left ventricular systolic function is moderately reduced with an   ejection fraction of 40-45 %. Mild concentric left ventricular hypertrophy. Definity contrast administered with no evidence of left ventricular mass or   thrombus noted.    Moderate global hypokinesis with regional variation. Grade II diastolic dysfunction with elevated filing pressure. Compared to last echo on 1/5/2021 (EF 55%), left ventricle systolic function   has decreased. The left atrium is mildly dilated. Individual aortic valve leaflets are not clearly visualized. Normally functioning TAVR 29 mm Langford vanita S3 bioprosthetic valve in   aortic position appears well seated with a max velocity of 2.27 m/sec,   maximum gradient of 21 mmHg and a mean gradient of 13 mmHg. Trace aortic valve regurgitation. Mild mitral regurgitation. A bubble study was performed and fails to show evidence of shunting. REVIEW OF SYSTEMS:   Constitutional: Positive generalized weakness; negative for fever,chills,  ENT: Negative for headache, rhinorrhea, and sore throat.   Respiratory: Positive dyspnea at rest, wheezing, and cough  Cardiovascular: Negative for chest pain, palpitations; positive peripheral edema, orthopnea or PND  Gastrointestinal: Negative for N/V/D and abdominal pain; no hematemesis, hematochezia, or melena; no anorexia  Genitourinary: Positive oliguria   Hematologic/Lymphatic: Negative for bleeding tendency/excessive bruising  Musculoskeletal: Positive for chronic myalgias and arthalgias; able to ambulate without difficulty  Neurologic: Dysarthric speech and left-sided \"heaviness\"; negative for LOC, seizure activity, dysarthria, vertigoSkin: Negative for itching,rash, decubitus  Psychiatric: Positive hallucinations reported by family; negative for depression,anxiety, and agitation  Endocrine: Negative for polyuria/polydipsia/polyphagia; no heat/cold intolerance    Past Medical History:   has a past medical history of Ambulatory dysfunction, Aortic stenosis, Arthritis, Asthma, Bilateral hilar adenopathy syndrome, CAD (coronary artery disease), Cardiomyopathy ([address] CHF (congestive heart failure) ([address] Chronic pain, COPD (chronic obstructive pulmonary disease) ([address] Depression, Diabetes mellitus [facility]), Difficult intravenous access, Emphysema of lung ([facility] ESRD (end stage renal disease) on dialysis ([facility] Fear of needles, Gastric ulcer, GERD (gastroesophageal reflux disease), Heart valve problem, Hemodialysis patient ([address] History of spinal fracture, Hx of blood clots, Hyperlipidemia, Hypertension, MI (myocardial infarction) ([address] Neuromuscular disorder ([facility] Numbness and tingling in left arm, Pneumonia, PONV (postoperative nausea and vomiting), Prolonged emergence from general anesthesia, Sleep apnea, Stroke ([facility] TIA (transient ischemic attack), and Unspecified diseases of blood and blood-forming organs. Past Surgical History:   has a past surgical history that includes Tonsillectomy; cyst removal (08/14/2013); Colonoscopy; Coronary angioplasty with stent (05/26/15); vascular surgery (aprx 2 years ago); Colonoscopy (2/29/2015); Upper gastrointestinal endoscopy (01/06/2016); Upper gastrointestinal endoscopy (01/29/2017); other surgical history (02/01/2017); Dialysis fistula creation (Left, 10/30/2017); Diagnostic Cardiac Cath Lab Procedure; other surgical history (2018); other surgical history (Bilateral, 06/26/2018); pr lap insertion tunneled intraperitoneal catheter (N/A, 9/21/2018); other surgical history (Right, 09/2018); Dialysis fistula creation (Left, 3/27/2019); other surgical history (05/28/2019); Endoscopy, colon, diagnostic; Catheter Removal (Right, 7/2/2019); and Aortic valve replacement (N/A, 10/15/2019). Medications:  No current facility-administered medications on file prior to encounter.      Current Outpatient Medications on File Prior to Encounter   Medication Sig Dispense Refill    Continuous Blood Gluc Sensor (DEXCOM G6 SENSOR) MISC Every 10 days 9 each 3    Continuous Blood Gluc Transmit (DEXCOM G6 TRANSMITTER) MISC 1 each by Does not apply route every 3 months 1 each 3    Continuous Blood Gluc  (DEXCOM G6 ) ADAM 1 each by Does not apply route Daily with lunch 1 each 0    hydrALAZINE (APRESOLINE) 50 MG tablet TAKE 1/2 TABLET BY MOUTH EVERY 8 HOURS Take extra 1/2 for blood pressure greater than 160/100 120 tablet 2    DULoxetine (CYMBALTA) 60 MG extended release capsule TAKE 1 CAPSULE BY MOUTH EVERY DAY 30 capsule 5    carvedilol (COREG) 12.5 MG tablet TAKE 1 TABLET BY MOUTH TWICE DAILY WITH MEALS 60 tablet 10    traZODone (DESYREL) 150 MG tablet TAKE ONE (1) TABLET BY MOUTH NIGHTLY 30 tablet 10    dilTIAZem (CARDIZEM CD) 180 MG extended release capsule TAKE 1 CAPSULE BY MOUTH DAILY 30 capsule 10    pravastatin (PRAVACHOL) 40 MG tablet TAKE 1 TABLET BY MOUTH EACH EVENING 30 tablet 10    B Complex-C-Folic Acid (VIRT-CAPS) 1 MG CAPS TAKE 1 CAPSULE BY MOUTH EVERY DAY 90 capsule 1    oxyCODONE-acetaminophen (PERCOCET) 7.5-325 MG per tablet Take 1 tablet by mouth every 4-6 hours as needed for Pain for up to 30 days.  150 tablet 0    losartan (COZAAR) 25 MG tablet Take 1 tablet by mouth daily 30 tablet 3    torsemide (DEMADEX) 100 MG tablet Take 1 tablet by mouth daily 30 tablet 3    isosorbide mononitrate (IMDUR) 30 MG extended release tablet TAKE 1 TABLET BY MOUTH EVERY DAY 30 tablet 10    pantoprazole (PROTONIX) 40 MG tablet TAKE (1) TABLET BY MOUTH EACH MORNING BEFORE BREAKFAST 30 tablet 1    Calcium Acetate, Phos Binder, 667 MG CAPS TAKE 1 CAPSULE BY MOUTH THREE TIMES DAILY WITH MEALS 90 capsule 3    QUEtiapine (SEROQUEL) 50 MG tablet TAKE (1) TABLET BY MOUTH IN THE EVENING 30 tablet 2    BASAGLAR KWIKPEN 100 UNIT/ML injection pen ADMINISTER 60 UNITS UNDER THE SKIN EVERY NIGHT 15 mL 5    albuterol (PROVENTIL) (2.5 MG/3ML) 0.083% nebulizer solution INHALE 1 VIAL VIA NEBULIZER EVERY 6 HOURS AS NEEDED FOR WHEEZING 300 mL 5    famotidine (PEPCID) 20 MG tablet TAKE 1 TABLET BY MOUTH TWICE DAILY AS NEEDED FOR HEARTBURN 180 tablet 0    hydrOXYzine (VISTARIL) 50 MG capsule TAKE 1 TO 2 CAPSULES BY MOUTH NIGHTLY 60 capsule 5    gabapentin (NEURONTIN) 100 MG capsule TAKE 1 TO 2 CAPSULES BY MOUTH THREE TIMES A  capsule 5    LINZESS 145 MCG capsule TAKE 1 CAPSULE BY MOUTH EVERY MORNING BEFORE BREAKFAST 30 capsule 10    cyclobenzaprine (FLEXERIL) 10 MG tablet TAKE 1 TABLET BY MOUTH EVERY 8 HOURS AS NEEDED 90 tablet 5    tiZANidine (ZANAFLEX) 4 MG tablet TAKE 1 TABLET BY MOUTH THREE TIMES DAILY (Patient not taking: Reported on 10/5/2021) 90 tablet 10    simethicone (MYLICON) 80 MG chewable tablet Take 1 tablet by mouth every 6 hours as needed (cramping) (Patient not taking: Reported on 10/5/2021) 15 tablet 0    glucose monitoring kit (FREESTYLE) monitoring kit 1 kit by Does not apply route daily (Patient not taking: Reported on 10/5/2021) 1 kit 0    blood glucose test strips (GLUCOSE METER TEST) strip 1 each by In Vitro route 5 times daily As needed. 100 each 3    nitroGLYCERIN (NITROSTAT) 0.4 MG SL tablet DISSOLVE 1 TABLET UNDER THE TONGUE AS NEEDED FOR CHEST PAIN EVERY 5 MINUTES UP TO 3 TIMES. IF NO RELIEF CALL 911. 25 tablet 10    insulin aspart (NOVOLOG FLEXPEN) 100 UNIT/ML injection pen Inject 20 Units into the skin 3 times daily (before meals) 15 pen 5    ondansetron (ZOFRAN ODT) 4 MG disintegrating tablet Take 1 tablet by mouth every 8 hours as needed for Nausea (Patient not taking: Reported on 10/5/2021) 60 tablet 0    blood glucose test strips (FREESTYLE LITE) strip Daily As needed.  100 strip 3    glucose monitoring kit (FREESTYLE) monitoring kit 1 kit by Does not apply route daily (Patient not taking: Reported on 10/5/2021) 1 kit 0    vitamin D (ERGOCALCIFEROL) 58915 units CAPS capsule TK 1 C PO WEEKLY  11    Tiotropium Bromide-Olodaterol (STIOLTO RESPIMAT) 2.5-2.5 MCG/ACT AERS Inhale 2 puffs into the lungs daily 2 Inhaler 0    Polyethylene Glycol 3350 GRAN       Glucose Blood (BLOOD GLUCOSE TEST STRIPS) STRP TEST 3-4 TIMES DAILY, AS DIRECTED (Patient not taking: Reported on 10/5/2021) 100 strip 3    Blood Glucose Monitoring Suppl ADAM USE AS DIRECTED. 1 Device 0    Alcohol Swabs PADS USE AS DIRECTED 300 each 3    albuterol sulfate  (90 Base) MCG/ACT inhaler Inhale 2 puffs into the lungs every 6 hours as needed for Wheezing 1 Inhaler 3    ipratropium-albuterol (DUONEB) 0.5-2.5 (3) MG/3ML SOLN nebulizer solution Inhale 3 mLs into the lungs every 6 hours as needed for Shortness of Breath 360 mL 1    calcium carbonate (TUMS) 500 MG chewable tablet Take 1 tablet by mouth 3 times daily as needed for Heartburn.  aspirin 81 MG chewable tablet Take 1 tablet by mouth daily. (Patient taking differently: Take 81 mg by mouth daily Indications: stopped on 6/25 for surgery ) 30 tablet 2       Allergies: Allergies   Allergen Reactions    Morphine Nausea And Vomiting        Social History:   reports that he has been smoking cigarettes. He has a 16.50 pack-year smoking history. He has never used smokeless tobacco. He reports previous alcohol use. He reports that he does not use drugs. Family History:  family history includes Alcohol Abuse in his brother; Cancer in his sister and sister; Diabetes in his mother; Heart Disease in his father, sister, and sister; Kidney Disease in his sister; Obesity in his sister and sister. Physical Exam:  BP (!) 153/67   Pulse 92   Temp 97.8 °F (36.6 °C) (Oral)   Resp 14   Wt 257 lb (116.6 kg)   SpO2 97%   BMI 40.25 kg/m²     General appearance:  Morbidly obese disheveled male who appears much older than his stated age  Eyes: Sclera clear without conjunctival injection; PERRLA; EOMI  ENT: Mucous membranes moist; positive lingual thrush  Neck: Supple without meningismus; no goiter; no carotid bruit bilaterally  Cardiovascular: Regular rhythm without ectopy; soft S1-S2 with no murmurs; 2+ pitting peripheral edema  Respiratory: Positive tachypnea; diminished air exchange throughout with expiratory wheeze  Gastrointestinal: Abdomen morbidly obese, non-tender; bowel sounds normal; no masses/organomegaly appreciated  Musculoskeletal: FROM spine and extremities x4; no gross deformity  Neurology: Somnolent but arousable to touch; dysarthric speech; refusing to participate with neuro exam; moving extremities x4; no seizure activity  Psychiatry: No hallucinations at time of exam; limited secondary to somnolence  Skin: Warm, dry, normal turgor, BLE venous stasis dermatitis  PV: 1/4 radial and dorsalis pedis bilaterally; brisk capillary refill    Labs:  CBC:   Lab Results   Component Value Date    WBC 11.4 10/11/2021    RBC 3.82 10/11/2021    HGB 11.2 10/11/2021    HCT 34.4 10/11/2021    MCV 90.2 10/11/2021    MCH 29.3 10/11/2021    MCHC 32.5 10/11/2021    RDW 15.9 10/11/2021     10/11/2021    MPV 8.9 10/11/2021     BMP:    Lab Results   Component Value Date     10/11/2021    K 4.7 10/11/2021     10/11/2021    CO2 22 10/11/2021    BUN 76 10/11/2021    CREATININE 8.3 10/11/2021    CALCIUM 9.2 10/11/2021    GFRAA 8 10/11/2021    GFRAA >60 05/17/2013    LABGLOM 7 10/11/2021    GLUCOSE 167 10/11/2021     CT HEAD WO CONTRAST   Final Result   Small nonhemorrhagic lacunar infarction, anterior left thalamus new from the   prior study. This is likely recent given the density. Multiple old lacunar infarctions in the basal ganglia unchanged. XR CHEST PORTABLE   Final Result   Mild cardiomegaly and vascular congestion.          MRI brain without contrast    (Results Pending)         EKG:     Ventricular Rate 91 BPM QTc Calculation (Bazett) 462 ms   Atrial Rate 91 BPM P Axis 72 degrees   P-R Interval 186 ms R Axis -13 degrees   QRS Duration 92 ms T Axis 97 degrees   Q-T Interval 376 ms Diagnosis Normal sinus rhythmNonspecific T wave abnormalityAbnormal           I visualized CXR images and EKG strips personally and agree with documented interpretation    Discussed case  with ED provider    Problem List  Principal Problem:    Acute cerebrovascular accident (CVA) ([address]  Active Problems:    Acute combined last month reviewed with report noted above  -2G Na and fluid restriction dietary modifications in place  -Consult placed to Cardiology for further recommendations    Daily EtOH abuse  -Admit to telemetry floor with [facility] Protocol order set initiated  -Urine drug screen ordered to rule out potential cross addiction/polysubstance abuse  -Seizure and fall risk precautions in place  -Thiamine, folate, and MVI oral supplementation initiated daily  -Ativan scheduled PRN based on CIWA score  -PPI Protonix ordered daily for GI prophylaxis    DVT prophylaxis-heparin 5000 subcu every 8  Code status-full code  Diet-cardiac 2 g sodium with carb/fluid/renal restriction  IV access-PIV established in ED    Admit as inpatient. I anticipate hospitalization spanning more than two midnights for investigation and treatment of the above medically necessary diagnoses. Please note that some part of this chart was generated using Dragon dictation software. Although every effort was made to ensure the accuracy of this automated transcription, some errors in transcription may have occurred inadvertently. If you may need any clarification, please do not hesitate to contact me through [facility].        Anthony Riojas MD    10/11/2021 11:35 PM

## 2021-10-12 NOTE — PROGRESS NOTES
Perfect served MD [place]. [address] [facility] or Facility: [facility] From: Clive James RE: Mohsen Nephew 1970 RM: 80     FYI: Patient is confused and agitated. Patient was pulling at lines and trying to climb out of bed in dialysis so they were not able to take very much off.      Need Callback: NO CALLBACK REQ C5 MED Lawerance Mary / Cleo Ratel

## 2021-10-12 NOTE — PROGRESS NOTES
Perfect served MD [place]. [address] [facility] or Facility: [facility] From: [facility] RE: Roxanne Andre 1970 RM: 80     SLP cleared patient to have thin liquids and regular food. Can we advance his diet?      Need Callback: NO CALLBACK REQ C5 MED Slade Badillo / Aziza Daniels

## 2021-10-12 NOTE — PROGRESS NOTES
Hospitalist Progress Note      PCP: Tad Ashley MD    Date of Admission: 10/11/2021    Chief Complaint: Altered mental status    Subjective: No new c/o.        Medications:  Reviewed    Infusion Medications    dextrose      sodium chloride      sodium chloride       Scheduled Medications    torsemide  100 mg Oral Daily    carvedilol  12.5 mg Oral BID    linaclotide  145 mcg Oral QAM AC    traZODone  150 mg Oral Nightly    insulin glargine  30 Units SubCUTAneous BID    Calcium Acetate (Phos Binder)  667 mg Oral TID WC    DULoxetine  60 mg Oral Daily    gabapentin  100 mg Oral TID    isosorbide mononitrate  30 mg Oral Daily    losartan  25 mg Oral Daily    pantoprazole  40 mg Oral QAM AC    pravastatin  40 mg Oral Nightly    tiotropium-olodaterol  2 puff Inhalation Daily    QUEtiapine  25 mg Oral Nightly    sodium chloride flush  10 mL IntraVENous 2 times per day    aspirin  81 mg Oral Daily    Or    aspirin  300 mg Rectal Daily    insulin lispro  0.05 Units/kg SubCUTAneous TID WC    insulin lispro  0-12 Units SubCUTAneous TID WC    insulin lispro  0-6 Units SubCUTAneous Nightly    sodium chloride flush  10 mL IntraVENous 2 times per day    thiamine  100 mg IntraVENous Daily    multivitamin  1 tablet Oral Daily    nicotine  1 patch TransDERmal Daily    heparin (porcine)  5,000 Units SubCUTAneous 3 times per day     PRN Meds: [Held by provider] cyclobenzaprine, glucose, dextrose, glucagon (rDNA), dextrose, sodium chloride flush, sodium chloride, promethazine **OR** ondansetron, senna, bisacodyl, labetalol, sodium chloride flush, sodium chloride, LORazepam **OR** LORazepam **OR** LORazepam **OR** LORazepam **OR** LORazepam **OR** LORazepam **OR** LORazepam **OR** LORazepam    No intake or output data in the 24 hours ending 10/12/21 1022    Physical Exam Performed:    /71   Pulse 88   Temp 98.4 °F (36.9 °C)   Resp 16   Ht 5' 8\" (1.727 m)   Wt 256 lb 6.3 oz (116.3 kg)   SpO2 93%   BMI 38.98 kg/m²     General appearance: No apparent distress, appears stated age and cooperative. HEENT: Pupils equal, round, and reactive to light. Conjunctivae/corneas clear. Neck: Supple, with full range of motion. No jugular venous distention. Trachea midline. Respiratory:  Normal respiratory effort. Clear to auscultation, bilaterally without Rales/Wheezes/Rhonchi. Cardiovascular: Regular rate and rhythm with normal S1/S2 without murmurs, rubs or gallops. Abdomen: Soft, non-tender, non-distended with normal bowel sounds. Musculoskeletal: No clubbing, cyanosis or edema bilaterally. Full range of motion without deformity. Skin: Skin color, texture, turgor normal.  No rashes or lesions. Neurologic:  Neurovascularly intact without any focal sensory/motor deficits.  Cranial nerves: II-XII intact, grossly non-focal.  Psychiatric: Alert and oriented, thought content appropriate, normal insight  Capillary Refill: Brisk,< 3 seconds   Peripheral Pulses: +2 palpable, equal bilaterally       Labs:   Recent Labs     10/11/21  1520 10/12/21  0607   WBC 11.4* 18.5*   HGB 11.2* 11.3*   HCT 34.4* 35.1*    247     Recent Labs     10/11/21  1520 10/12/21  0608    143   K 4.7 5.1    105   CO2 22 22   BUN 76* 79*   CREATININE 8.3* 8.1*   CALCIUM 9.2 9.4     Recent Labs     10/11/21  1520 10/12/21  0608   AST 13* 9*   ALT 10 9*   BILITOT <0.2 <0.2   ALKPHOS 100 100     Recent Labs     10/11/21  2040   INR 0.98     Recent Labs     10/12/21  0253 10/12/21  0607 10/12/21  0932   TROPONINI 0.15* 0.15* 0.16*       Urinalysis:      Lab Results   Component Value Date    NITRU Negative 09/07/2021    WBCUA 10-20 09/07/2021    BACTERIA 1+ 09/07/2021    RBCUA 3-4 09/07/2021    BLOODU TRACE-LYSED 09/07/2021    SPECGRAV 1.015 09/07/2021    GLUCOSEU 100 09/07/2021       Consults:    IP CONSULT TO HOSPITALIST  IP CONSULT TO NEPHROLOGY  IP CONSULT TO NEUROLOGY      Assessment/Plan:    Active Hospital Problems Diagnosis     CAD S/P percutaneous coronary angioplasty [I25.10, Z98.61]      Priority: High    Chronic obstructive pulmonary disease (HCC) [J44.9]      Priority: Medium    Acute [facility]A (cerebrovascular accident) [facility] [I63.9]     Alcohol abuse, daily use [F10.10]     Acute cerebrovascular accident (CVA) ([facility] [I63.9]     Acute encephalopathy [G93.40]     Acute combined systolic and diastolic CHF, NYHA class 4 ([email or web address] [I50.41]     ESRD (end stage renal disease) on dialysis ([email or web address] [N18.6, Z99.2]     ZAINAB (obstructive sleep apnea) [G47.33]     DM (diabetes mellitus), secondary, uncontrolled, w/neurologic complic (HCC) [X75.31, H83.65]        Acute left anterior thalamic CVA with encephalopathy  -Admit to telemetry under CVA pathway with Q4H neuro checks scheduled overnight  -Aspiration precautions and fall protocol in place  -Ammonia level, VBG, thyroid studies, UA, UDS, ethanol level ordered to further assess encephalopathy  -Initiate EC ASA 81 mg daily antiplatelet agent as well as high-dose statin therapy; FLP pending  -PRN Labetalol scheduled for extreme BP elevation; allow permissive HTN initially to ensure watershed blood flow remains intact  -MRI of the head with and without contrast scheduled for a.m.  -PT/OT/SLP consultation placed  -NEURO consult placed for further recommendations  -Needs secondary stroke prevention (DM2, HTN, HLD, tobacco abuse, ZAINAB, obesity)     ESRD on HD  -Patient noncompliant with today's HD session and arrives in volume overload  -Consultation placed to NEPHRO to initiate inpatient HD  -Continue renal replacement therapy     COPD with ZAINAB  -Initial VBG notable for respiratory alkalosis; orders placed for BiPAP overnight  -Continue Stiolto MDI with albuterol nebs as needed   -Encourage aggressive pulmonary toilet including incentive spirometry every 4H while awake  -Patient counseled on necessity of smoking cessation; nicotine replacement patch provided     CAD with decompensated CHF  -Admit to floor for continuous telemetry monitoring and strict I's & O's  -Continue home doses of Coreg, Imdur, Cozaar, and Demadex  -Results of recent echo obtained last month reviewed with report noted above  -2G Na and fluid restriction dietary modifications in place  -Consult placed to Cardiology for further recommendations     Daily EtOH abuse  -Admit to telemetry floor with CIWA Protocol order set initiated  -Urine drug screen ordered to rule out potential cross addiction/polysubstance abuse  -Seizure and fall risk precautions in place  -Thiamine, folate, and MVI oral supplementation initiated daily  -Ativan scheduled PRN based on CIWA score  -PPI Protonix ordered daily for GI prophylaxis       DVT Prophylaxis: SQ Heparin/IPC     Recent Labs     10/11/21  1520 10/12/21  0607    247     Diet: Diet NPO  Code Status: Full Code      PT/OT Eval Status: ordered and pending    Dispo - Possibly Thurs/Michelle 14/15 October pending clinical course and subspecialty recs.      Trang Mccurdy MD

## 2021-10-12 NOTE — PROGRESS NOTES
Paged Radha Reilly NP Neurology. [address] or Facility: [facility] From: [facility] RE: Mohsen Nephew 1970 RM: 193     Patient was combative, pulling lines and climbing out of bed in dialysis. Do you want him to get Ativan for MRI or wait for tomorrow?      Need Callback: NO CALLBACK REQ C5 MED SURG / ORTHO    Response: \"Lets just get it done tomorrow\"

## 2021-10-12 NOTE — FLOWSHEET NOTE
Treatment time: 1.5 hours  Net UF: 1242 ml     Pre weight: 132.2 kg   Post weight: 122 kg  EDW: 118 kg     Access used: [facility] TDC  Access function: Positional with  ml/min     Medications or blood products given: Heparin for catheter dwells     Regular outpatient schedule: MWF     Summary of response to treatment:      10/12/21 1250 10/12/21 1428   Vital Signs   BP (!) 143/67 130/85   Temp 98.1 °F (36.7 °C) 98 °F (36.7 °C)   Pulse 85 95   Resp 20 24   Weight 271 lb 9.7 oz (123.2 kg) 268 lb 15.4 oz (122 kg)   Weight Method Actual;Bed scale  --    Percent Weight Change 5.93 -0.97   Pain Assessment   Pain Assessment 0-10  --    Pain Level 0  --    Post-Hemodialysis Assessment   Post-Treatment Procedures  --  Blood returned;Catheter capped, clamped and heparinized x 2 ports   Machine Disinfection Process  --  Acid/Vinegar Clean;Heat Disinfect; Exterior Machine Disinfection   Rinseback Volume (ml)  --  400 ml   Total Liters Processed (l/min)  --  36.8 l/min   Dialyzer Clearance  --  Moderately streaked   Duration of Treatment (minutes)  --  98 minutes   Heparin amount administered during treatment (units)  --  0 units   Hemodialysis Intake (ml)  --  400 ml   Hemodialysis Output (ml)  --  1642 ml   NET Removed (ml)  --  1242 ml   Tolerated Treatment  --  Poor   Copy of dialysis treatment record placed in chart, to be scanned into EMR. Pt with increased anxiety and agitation during treatment. Unable to follow commands. Tx ended after 98 minutes. Dr Hermelinda Paulino notified.  Report called to Cara, RN

## 2021-10-12 NOTE — CARE COORDINATION
10/12/21 [facility] can accept, referral made to [facility] and they have accepted.

## 2021-10-12 NOTE — PROGRESS NOTES
Admitted to room 539 via stretcher from the emergency room. Patient is drowsy and opens eyes to name. Restless and uncooperative at this time. Will follow some commands and ignores others. Will not read or look at pictures on stroke scale assessment. Very difficult to evaluate entire stroke scale at this time. Call light in reach and bed alarm activated.

## 2021-10-12 NOTE — PROGRESS NOTES
Speech Language Pathology  Facility/Department: [address] SURG/ORTHO  Initial Speech/Language/Cognitive Assessment    NAME: Jessica Luke  : 1970   MRN: [id number]  ADMISSION DATE: 10/11/2021  ADMITTING DIAGNOSIS: has Acute respiratory failure with hypoxia and hypercapnia ([address] Chronic obstructive pulmonary disease (HCC); CAD S/P percutaneous coronary angioplasty; PVD (peripheral vascular disease) ([address] Nonischemic cardiomyopathy ([address] Sleep apnea; Bicuspid aortic valve; Bilateral hilar adenopathy syndrome; Claudication in peripheral vascular disease ([address] Essential hypertension; Diabetic neuropathy ([address] Type 2 diabetes, uncontrolled, with neuropathy ([address] Passive smoke exposure; Depression with anxiety; Hematoma; Pneumonia of right upper lobe due to infectious organism; DM (diabetes mellitus), secondary, uncontrolled, w/neurologic complic ([address] Hypertensive heart disease with congestive heart failure with preserved left ventricular function ([address] CHF exacerbation ([address] Chest pain; Coronary artery disease involving native coronary artery of native heart without angina pectoris; Obesity (BMI 30-39.9); ZAINAB on CPAP; Degeneration of lumbar or lumbosacral intervertebral disc; Lumbar radiculopathy; Lumbosacral spondylosis without myelopathy; Biliary colic; Symptomatic cholelithiasis; Gastroparesis due to DM ([address] Angina, class IV ([address] Dyspnea; Elevated brain natriuretic peptide (BNP) level; Dyslipidemia; Respiratory distress; Hypoxia; Chest pressure; Hypertensive urgency; Acute on chronic combined systolic and diastolic heart failure ([address] Ischemic cardiomyopathy; Chronic renal failure, stage 5 ([address] Tobacco abuse; CKD stage 4 due to type 2 diabetes mellitus ([address] CVA (cerebral vascular accident) ([address] Arterial ischemic stroke, ICA, right, acute ([address]  Type 2 diabetes mellitus without complication, without long-term current use of insulin ([address] HTN (hypertension), benign; ZAINAB (obstructive sleep apnea); Diarrhea; Pleural effusion; Chronic anemia; Nonrheumatic aortic valve stenosis; Hypervolemia; Hyperkalemia; Obesity; Mucus plugging of bronchi; Hemodialysis-associated hypotension; Left arm pain; Dizziness; ESRD (end stage renal disease) on dialysis ([address] Hypotension due to drugs; Acute diastolic CHF (congestive heart failure) ([address] Neuromuscular disorder ([address] Acute combined systolic and diastolic CHF, NYHA class 4 ([address] Renovascular hypertension; Mixed hyperlipidemia; Cigarette nicotine dependence in remission; Acute respiratory failure ([address] Pulmonary edema; Fluid overload; Diastolic dysfunction; Anemia of chronic disease; SOB (shortness of breath); Steal syndrome of dialysis vascular access ([address] Chronic, continuous use of opioids; Chronic bronchitis ([address] Nasal congestion; Hypercholesteremia; Bradycardia; S/P TAVR (transcatheter aortic valve replacement); Syncope and collapse; Atrial fibrillation ([address] Former smoker; Generalized weakness; DKA, type 1, not at goal [facility]); Bilateral leg weakness; GBS (Guillain-Brilliant syndrome) ([address] Sinus pause; Weakness of both lower extremities; Sinus bradycardia; Ataxia; Peripheral vascular occlusive disease ([address] Cellulitis of right foot; Iliac artery occlusion, right ([address] Cellulitis and abscess of hand; Type 2 diabetes mellitus with hyperglycemia ([address] Acute encephalopathy; Acute hypoxemic respiratory failure (HCC); CHF (congestive heart failure) ([address] Acute cerebrovascular accident (CVA) ([address] Speech problem; Left face and left arm tingling; Urinary tract infection with hematuria; Acute CVA (cerebrovascular accident) ([address] and Alcohol abuse, daily use on their problem list.  DATE ONSET: Pt admitted to [facility] on 10/11/21    Date of Eval: 10/12/2021   Evaluating Therapist: JOSE Rene    RECENT RESULTS  CT OF HEAD (10/11/21):     Impression   Small nonhemorrhagic lacunar infarction, anterior left thalamus new from the   prior study.  This is likely recent given the density.       Multiple old lacunar infarctions in the basal ganglia unchanged. MRI of the Brain ordered this date. Primary Complaint:  Per MD H&P, \" Colin Patterson is a 46 y.o. male who presented to the ED for altered mental status, hallucinations, and garbled speech per report of family members. Patient is an extremely poor historian therefore HPI is limited. He has multiple comorbidities including uncontrolled type II DM, decompensated CHF, ESRD on HD, ZAINAB/COPD with tobacco abuse, EtOH daily abuse, and ESRD on HD (M/W/F; skipped today). He was admitted less than 1 month earlier for right cerebellar infarct, and was sent home with [facility].     Upon arrival to the ED head CT was obtained and notable for acute anterior left thalamic lacunar infarction. CXR revealed mild cardiomegaly with vascular congestion. Notable labs include BUN/CR of 76/8.3, glucose 167, troponin 0.14, and VBG pH 7.59 PCO2 15.4/PO2 183. 3. Hospitalist consulted to admit this patient for recurrent acute CVA as well as acutely decompensated comorbidities noted above\". Pain:  Pain Assessment  Pain Assessment: 0-10  Pain Level: 0  Patient's Stated Pain Goal: No pain  Pain Type: Chronic pain  Pain Location: Back  Pain Orientation: Lower  Pain Descriptors: Aching  Pain Frequency: Continuous  Non-Pharmaceutical Pain Intervention(s): Ambulation/Increased Activity, Repositioned, Emotional support    Assessment:  Cognitive Diagnosis: Moderate-severe cognitive deficit; poor insight into deficits & poor safety awareness   Pt seen upright in chair at bedside, alert and cooperative throughout evaluation, although confused. RN at bedside initially, assisting pt with completion of MRI intake form. Pt had significant difficulty answering background questions for RN regarding medical hx, etc.  Pt is a poor historian. He was oriented to self and place only ('hospital').   He was unable to provide correct year and SHANTELL despite f2 choices (*of note, he was unable to recall correct SHANTELL despite review x3 during eval). Pt impulsive throughout session, does not demonstrate awareness of current cognitive deficits, and has poor overall safety awareness. Pt made inappropriate comments at times as well. Pt demonstrates poor comprehension requiring multiple repetitions of instructions at times. Informal cognitive assessment & aspects of the [address] [place] [address] (SLUMS) Examination completed this date. 1-step basic commands: 100%, no cues  2-step basic commands: 100%, min-no cues  3-step basic commands: 50% accuracy, no cues  Basic & complex y/n questions: 5/7, no cues  Divergent namin items provided in 1 minute, no cues; 9 given min cues  Clock drawing task: pt did not provide outline of clock despite cues, incorrect placement of several numbers and '12' provided twice. Pt provided correct placement of 1 of 2 hands when given target time. Pt would benefit from continued ST during acute stay and after d/c to address speech/lang/cog deficits and for improved safety & independence. Recommendations:  Requires SLP Intervention: Yes  Duration/Frequency of Treatment: 2-4x/week for LOS  D/C Recommendations: To be determined (per PT/OT recs; further ST recommended after d/c)  Referral To: Speech Evaluation    Plan:   Goals:  Short-term Goals  Timeframe for Short-term Goals: 5 days (10/17/21)  Goal 1: The pt will be oriented x4, min-no cues. Goal 2: The pt will complete basic comprehension tasks with 80% accuracy, min cues. Goal 3: The pt will complete functional problem-solving tasks with 80% accuracy, min cues. Goal 4: The pt will complete functional graded recall tasks with 70% accuracy, mod cues. Long-term Goals  Timeframe for Long-term Goals: 7 days (10/19/21)  Goal 1: The pt will effectively use reviewed compensatory strategies for improved safety & independence upon d/c. Patient/family involved in developing goals and treatment plan: Yes    Subjective:  General  Chart Reviewed: Yes  Patient assessed for rehabilitation services?: Yes  Family / Caregiver Present: No  General Comment  Comments: Pt admitted d/t delirium, CVA. Pt has PMHx of COPD, CHF, PNA, GERD, CVA ,ESRD, emphysema, and sleep apnea per chart. Subjective  Subjective: Pt alert and cooperative throughout evaulation, pleasantly confused. Social/Functional History  Lives With: Spouse  Vision  Vision: Impaired  Vision Exceptions: Wears glasses for reading  Hearing  Hearing: Exceptions to [facility]  Hearing Exceptions: Hard of hearing/hearing concerns           Objective:     Oral/Motor  Oral Motor: Within functional limits    Auditory Comprehension  Comprehension: Exceptions  Two Step Basic Commands: Mild  Multistep Basic Commands: Moderate  Interfering Components: Attention - sustained; Motor planning;Processing speed; Working memory  Effective Techniques: Extra processing time;Stressing words;Repetition;Visual/Gestural cues    Reading Comprehension  Reading Status:  (Did not formally assess this date)    Expression  Primary Mode of Expression: Verbal    Verbal Expression  Verbal Expression: Exceptions to functional limits  Repetition: To be assessed in therapy  Divergent: Moderate  Interfering Components: Impaired thought organization  Effective Techniques: Provide extra time; Word retrived strategies    Written Expression  Written Expression: Exceptions to [facility]  Legibility Impairment Severity: Moderate (Clock drawing task only)  Interfering Components: Thought organization; Attention to detail  Effective Techniques: Verbal/Language cues;Effective monitoring and self-correction; Tactile/Visual cues    Motor Speech  Motor Speech: Exceptions to [facility]  Dysarthria : Mild (Needs further assessment; may be more r/t poor articulation.   Speech 100% intelligible in conversation as judged by SLP)    Pragmatics/Social Functioning  Pragmatics: Exceptions to [facility] (Pt making inappropriate responses at times during evaluation)    Cognition:      Orientation  Overall Orientation Status: Impaired  Orientation Level: Oriented to place;Oriented to situation;Oriented to person;Disoriented to time  Attention  Attention: Exceptions to [facility]  Alternating Attention: To be assessed in therapy  Divided Attention: To be assessed in therapy  Selective Attention: Mild (Needs further assessmet)  Sustained Attention: Mild (Needs further assessment)  Memory  Memory: Exceptions to [facility]  Short-term Memory: Severe (Pt unable to recall correct SHANTELL despite review x3 during eval)  Working Memory: To be assessed in therapy  Problem Solving  Problem Solving: Exceptions to [facility]  Managing Finances: To be assessed in therapy  Managing Medications: To be assessed in therapy  Simple Functional Tasks: Moderate  Verbal Reasoning Skills: To be assessed in therapy  Numeric Reasoning  Numeric Reasoning: Exceptions to [facility]   Calculations: To be assessed in therapy  Money Management: To be assessed in therapy  Time: Moderate (Mod-severe)  Abstract Reasoning  Abstract Reasoning: Exceptions to [facility]  Divergent Thinking: Moderate  Safety/Judgement  Safety/Judgement: Exceptions to [facility]  Unable to Self-monitor and Self-correct Consistently: Severe  Insight: Severe    Impulsive: Moderate    Prognosis:  Speech Therapy Prognosis  Prognosis: Good  Prognosis Considerations: Potential;Age; Co-Morbidities;Previous Level of Function; Family/Community Support;Severity of Impairments;Medical Diagnosis;Participation Level  Individuals consulted  Consulted and agree with results and recommendations: Patient;RN    Education:  Patient Education:Pt educated on reason for referral, role of ST, assessment results and recommendations. Patient Education Response: Needs reinforcement; No evidence of learning;Verbalizes understanding  Safety Devices in place: Yes  Type of devices: Call light within reach;Nurse notified; Left in chair;Chair alarm in place    Therapy Time:   Individual Concurrent Group Co-treatment   Time In 1030         Time Out 1050         Minutes 20           eval speech sound lang comp    MARGI Gamino [facility]  Speech-language pathologist  [phone]  Speech Desk Phone: [phone]

## 2021-10-12 NOTE — PROGRESS NOTES
Patient trying to climb out of bed, confused to place and time. Unable to reorient. Assisted back into bed, call light in reach and bed alarm remains activated. Moved to room 532 to have [employer] monitor on.

## 2021-10-12 NOTE — CONSULTS
In patient Neurology consult        [facility] Neurology      MD Luiz Goldstein [birth year]  1970    Date of Service: 10/12/2021    Referring Physician: Ari Mendoza MD      Reason for the consult and CC: Altere mental status    HPI:   The patient is a 46 y.o.  Carli Nailer a PMH of ESRD on HD, DM2, HTN, CAD, CVA, ZAINAB on CPAP, and obesity, who presented to [facility] with confusion. The patient is a poor historian and most of the history is obtained from the EMR. The patient states that his family noted he was having difficulty speaking at home and seemed confused. He was recently admitted to [facility] in 9/2021 for a right cerebellar stroke. He was discharged on 9/12/21. He was placed on a event monitor per Dr. Dewane Lanes to rule out PAF given recurrent stroke, which he says he has been wearing. He denies fever, chills, headache, dizziness, focal weakness. Per nursing he had difficulty swallowing overnight and is NPO for speech evaluation.         Family History   Problem Relation Age of Onset    Diabetes Mother     Heart Disease Father     Kidney Disease Sister         stage 4-kidney failure    Cancer Sister     Heart Disease Sister     Obesity Sister     Cancer Sister     Heart Disease Sister     Obesity Sister     Alcohol Abuse Brother      Past Surgical History:   Procedure Laterality Date    AORTIC VALVE REPLACEMENT N/A 10/15/2019    TRANSCATHETER AORTIC VALVE REPLACEMENT FEMORAL APPROACH performed by Tiffanie Graham MD at [address] [place] [address] Right 7/2/2019    PERITONEAL DIALYSIS CATHETER REMOVAL performed by Megan Smith MD at [facility]  2/29/2015    [facility]    CORONARY ANGIOPLASTY WITH STENT PLACEMENT  05/26/15    CYST REMOVAL  08/14/2013    EXCISION CYSTS, NECK X2 AND ABDOMINAL benign    DIAGNOSTIC CARDIAC CATH LAB PROCEDURE      DIALYSIS FISTULA CREATION Left 10/30/2017    LEFT BRACHIAL CEPHALIC FISTULA    DIALYSIS FISTULA CREATION Left 3/27/2019    LIGATION  AV FISTULA performed by Marielos Stewart MD at  [address], COLON, DIAGNOSTIC      OTHER SURGICAL HISTORY  02/01/2017    laparoscopic cholecystectomy with intraoperative cholangiogram    OTHER SURGICAL HISTORY  2018    PORT PLACEMENT  - vas cath    OTHER SURGICAL HISTORY Bilateral 06/26/2018    laprascopic peritoneal dialysis catheter placement    OTHER SURGICAL HISTORY Right 09/2018    peritoneal dialysis port placed on right side of abdomen    OTHER SURGICAL HISTORY  05/28/2019    PTA/Stenting R External Iliac artery    [place] LAP INSERTION TUNNELED INTRAPERITONEAL CATHETER N/A 9/21/2018    LAPAROSCOPIC PERITONEAL DIALYSIS CATHETER REPLACEMENT performed by Haleigh Castaneda MD at [address] [facility] [address]  01/06/2016    UPPER GASTROINTESTINAL ENDOSCOPY  01/29/2017    possible candida, otherwise normal appearing    VASCULAR SURGERY  aprx 2 years ago    2 stents placed, each side of groin        Past Medical History:   Diagnosis Date    Ambulatory dysfunction     walker for long distances, SOB with distance    Aortic stenosis     echo 2017    Arthritis     hands and hips    Asthma     Bilateral hilar adenopathy syndrome 6/3/2013    CAD (coronary artery disease)     Dr. Lacy Gamble [facility]) 04/19/2019    EF= 43%    CHF (congestive heart failure) (HCC)     Chronic pain     COPD (chronic obstructive pulmonary disease) ([address]     pulmonology Dr. Umang Patel    Depression     Diabetes mellitus ([address]     borderline    Difficult intravenous access     Emphysema of lung ([address]     ESRD (end stage renal disease) on dialysis ([address]     MWF    Fear of needles     Gastric ulcer     GERD (gastroesophageal reflux disease)     Heart valve problem     bicuspic valve    Hemodialysis patient ([address]     History of spinal fracture     work incident    Hx of blood clots     Bilateral lower extremities; stents in place    Hyperlipidemia     Hypertension     [facility]) [facility] 2019    has had 9 MIs. 2019 was the last    Neuromuscular disorder ([facility]     due to CVA    Numbness and tingling in left arm     from fistula    Pneumonia     PONV (postoperative nausea and vomiting)     Prolonged emergence from general anesthesia     States requires more medication than most people    Sleep apnea     Uses CPAP    Stroke ([facility]     7mm thalamic cva 2017 deficts left side, left side weakness    TIA (transient ischemic attack)     Unspecified diseases of blood and blood-forming organs      Social History     Tobacco Use    Smoking status: Current Every Day Smoker     Packs/day: 0.50     Years: 33.00     Pack years: 16.50     Types: Cigarettes     Last attempt to quit: 2020     Years since quittin.4    Smokeless tobacco: Never Used   Vaping Use    Vaping Use: Never used   Substance Use Topics    Alcohol use: Not Currently     Alcohol/week: 0.0 standard drinks     Comment: occ    Drug use: No     Allergies   Allergen Reactions    Morphine Nausea And Vomiting     Current Facility-Administered Medications   Medication Dose Route Frequency Provider Last Rate Last Admin    torsemide (DEMADEX) tablet 100 mg  100 mg Oral Daily Ky Parson MD        carvedilol (COREG) tablet 12.5 mg  12.5 mg Oral BID Ky Parson MD   12.5 mg at 10/11/21 2233    [Held by provider] cyclobenzaprine (FLEXERIL) tablet 10 mg  10 mg Oral TID PRN Ky Parson MD        linaclotide Oral Draft) capsule 145 mcg  145 mcg Oral QAM AC Ky Parson MD        traZODone (DESYREL) tablet 150 mg  150 mg Oral Nightly Ky Parson MD   150 mg at 10/11/21 223    insulin glargine (LANTUS) injection vial 30 Units  30 Units SubCUTAneous BID Ky Parson MD   30 Units at 10/11/21 2244    Calcium Acetate (Phos Binder) CAPS 667 mg  667 mg Oral TID WC Ky Parson MD        DULoxetine (CYMBALTA) extended release Markos Garvey MD        insulin lispro (HUMALOG) injection vial 6 Units  0.05 Units/kg SubCUTAneous TID  Markos Garvey MD        insulin lispro (HUMALOG) injection vial 0-12 Units  0-12 Units SubCUTAneous TID WC Markos Garvey MD        insulin lispro (HUMALOG) injection vial 0-6 Units  0-6 Units SubCUTAneous Nightly Markos Garvey MD        sodium chloride flush 0.9 % injection 10 mL  10 mL IntraVENous 2 times per day Markos Garvey MD   10 mL at 10/11/21 0000    sodium chloride flush 0.9 % injection 10 mL  10 mL IntraVENous PRN Markos Garvey MD        0.9 % sodium chloride infusion  25 mL IntraVENous PRN Markos Garvey MD        LORazepam (ATIVAN) tablet 1 mg  1 mg Oral Q1H PRN Markos Garvey MD        Or    LORazepam (ATIVAN) injection 1 mg  1 mg IntraVENous Q1H PRN Markos Garvey MD   1 mg at 10/11/21 2032    Or    LORazepam (ATIVAN) tablet 2 mg  2 mg Oral Q1H PRN Markos Garvey MD        Or    LORazepam (ATIVAN) injection 2 mg  2 mg IntraVENous Q1H PRN Markos Garvey MD   2 mg at 10/11/21 2239    Or    LORazepam (ATIVAN) tablet 3 mg  3 mg Oral Q1H PRN Markos Garvey MD        Or    LORazepam (ATIVAN) injection 3 mg  3 mg IntraVENous Q1H PRN Markos Garvey MD        Or    LORazepam (ATIVAN) tablet 4 mg  4 mg Oral Q1H PRN Markos Garvey MD        Or    LORazepam (ATIVAN) injection 4 mg  4 mg IntraVENous Q1H PRN Markos Garvey MD        thiamine (B-1) injection 100 mg  100 mg IntraVENous Daily Markos Garvey MD   100 mg at 10/11/21 2033    therapeutic multivitamin-minerals 1 tablet  1 tablet Oral Daily Markos Garvey MD   1 tablet at 10/11/21 2033    nicotine (NICODERM CQ) 14 MG/24HR 1 patch  1 patch TransDERmal Daily Markos Garvey MD        heparin (porcine) injection 5,000 Units  5,000 Units SubCUTAneous 3 times per day Markos Garvey MD   5,000 Units at 10/12/21 0551       ROS : A 10-14 system review of constitutional, cardiovascular, respiratory, eyes, musculoskeletal, endocrine, GI, ENT, skin, hematological, genitourinary, psychiatric and neurologic systems was obtained and updated today and is unremarkable except as mentioned in my HPI      Exam:     Constitutional:   Vitals:    10/12/21 0815 10/12/21 0816 10/12/21 0817 10/12/21 0818   BP: (!) 174/87   (!) 174/87   Pulse: 83 84 83    Resp: 16      Temp: 98.4 °F (36.9 °C) 98.4 °F (36.9 °C)     TempSrc: Oral      SpO2: 100% 100% 98% 99%   Weight:       Height:           General appearance and observation: Normal development and appear in no acute distress. Neck: supple  Cardiovascular: No lower leg edema with good pulsation. Mental Status:   Oriented to person, place, and time. Forgetful. Poor memory. Short attention span. Dysarthria noted. Poor fund of knowledge. Cranial Nerves:   II: Visual fields: Full. Pupils: equal, round, reactive to light  III,IV,VI: Extra Ocular Movements are intact. No nystagmus  V: Facial sensation is intact  VII: Facial strength and movements: intact and symmetric  VIII: Hearing: Intact  IX: Palate elevation is symmetric  XI: Shoulder shrug is intact  XII: Tongue movements are normal  Musculoskeletal: 4/5 LUE and LLE. Tone: Normal tone. Planters: flexor bilaterally. Coordination: no pronator drift, no dysmetria with FNF in upper extremities. Normal REM. Sensation: decreased sensation, bilateral feet, chronic   Gait/Posture: did not test gait.       Data:  LABS:   Lab Results   Component Value Date     10/12/2021    K 5.1 10/12/2021     10/12/2021    CO2 22 10/12/2021    BUN 79 10/12/2021    CREATININE 8.1 10/12/2021    GFRAA 8 10/12/2021    GFRAA >60 05/17/2013    LABGLOM 7 10/12/2021    GLUCOSE 78 10/12/2021    PHOS 7.8 09/08/2021    MG 2.30 10/11/2021    CALCIUM 9.4 10/12/2021     Lab Results   Component Value Date    WBC 18.5 10/12/2021    RBC 3.87 10/12/2021    HGB 11.3 10/12/2021    HCT 35.1 10/12/2021    MCV 90.8 10/12/2021    RDW 15.7 10/12/2021     10/12/2021     Lab Results   Component Value Date    INR 0.98 10/11/2021    PROTIME 11.1 10/11/2021       Neuroimaging was independently reviewed by myself and discussed results with the patient and/or family  Reviewed notes from different physicians  Reviewed lab and blood testing    Impression:     Subacute lacunar CVA, likely ischemic, anterior left thalamus. Wonder if patient is compliant with his medications at home. Recent right cerebellar CVA 9/2021  ESRD on HD  HTN  CAD  DM2 with neuropathy, uncontrolled. A1c 9.9  HLD  Obesity  ZAINAB       Recommendation:     MRI of brain ordered. No need to repeat carotid US and ECHO. Cardiology ordered 30-day event monitor to evaluate for PAF - patient states he was wearing at home. Continue ASA, statin. Continue home BP meds. Avoid hypotension. Continue home Lantus. Add SSI. Improve glycemic control. Continue home gabapentin and SSRI. Nephrology managing HD. Continue CPAP nightly. PT/OT      Thank you for referring such patient. If you have any questions regarding my consult note, please don't hesitate to call me. Renato Colby CNP    This dictation was generated by voice recognition computer software.  Although all attempts are made to edit the dictation for accuracy, there may be errors in the  transcription that are not intended

## 2021-10-12 NOTE — PROGRESS NOTES
Physical Therapy    Facility/Department: [facility] C5 - MED SURG/ORTHO  Initial Assessment    NAME: Jad Morales  : 1970  MRN: [id number]    Date of Service: 10/12/2021    Discharge Recommendations:  Subacute/Skilled Nursing Facility   PT Equipment Recommendations  Equipment Needed: No  Other: TBD at SNF    Assessment   Body structures, Functions, Activity limitations: Decreased strength;Decreased balance;Decreased functional mobility ; Increased pain;Decreased endurance;Decreased posture  Assessment: Pt referred for PT evaluation during current hospital stay with dx of acute L anterior thalamic CVA with encephalopathy. Pt currently functioning below his reported baseline, requiring min-modA x 2 for safe transfers/amb within hospital room. Pt's gait is very unsteady and he represents a high fall risk. Pt unsafe to return home at current functional level; recommend SNF at D/C. Treatment Diagnosis: Impaired balance, decreased (I) with functional mobility  Specific instructions for Next Treatment: Progress ther ex and mobility as tolerated  Prognosis: Good  Decision Making: Medium Complexity  PT Education: Goals; General Safety;Gait Training;PT Role;Plan of Care;Disease Specific Education; Functional Mobility Training;Precautions;Transfer Training  Patient Education: Disease-specific education: Pt educated on role of PT in hospital and benefits of regular OOB activity to avoid complications of bedrest; pt verbalizes understanding. REQUIRES PT FOLLOW UP: Yes  Activity Tolerance: Patient Tolerated treatment well;Patient limited by pain  Activity Tolerance: Pt with c/o dizziness \"all the time. \"  BP = 144/86, HR = 88 bpm while seated EOB. Patient Diagnosis(es): The primary encounter diagnosis was Cerebrovascular accident (CVA) due to other mechanism ([facility] Diagnoses of ESRD (end stage renal disease) on dialysis [facility]) and Delirium were also pertinent to this visit.      has a past medical history of Ambulatory dysfunction, Aortic stenosis, Arthritis, Asthma, Bilateral hilar adenopathy syndrome, CAD (coronary artery disease), Cardiomyopathy ([address] CHF (congestive heart failure) ([address] Chronic pain, COPD (chronic obstructive pulmonary disease) ([address] Depression, Diabetes mellitus ([address] Difficult intravenous access, Emphysema of lung ([address] ESRD (end stage renal disease) on dialysis ([address] Fear of needles, Gastric ulcer, GERD (gastroesophageal reflux disease), Heart valve problem, Hemodialysis patient ([address] History of spinal fracture, Hx of blood clots, Hyperlipidemia, Hypertension, MI (myocardial infarction) ([address] Neuromuscular disorder ([address] Numbness and tingling in left arm, Pneumonia, PONV (postoperative nausea and vomiting), Prolonged emergence from general anesthesia, Sleep apnea, Stroke ([address] TIA (transient ischemic attack), and Unspecified diseases of blood and blood-forming organs. has a past surgical history that includes Tonsillectomy; cyst removal (08/14/2013); Colonoscopy; Coronary angioplasty with stent (05/26/15); vascular surgery (aprx 2 years ago); Colonoscopy (2/29/2015); Upper gastrointestinal endoscopy (01/06/2016); Upper gastrointestinal endoscopy (01/29/2017); other surgical history (02/01/2017); Dialysis fistula creation (Left, 10/30/2017); Diagnostic Cardiac Cath Lab Procedure; other surgical history (2018); other surgical history (Bilateral, 06/26/2018); pr lap insertion tunneled intraperitoneal catheter (N/A, 9/21/2018); other surgical history (Right, 09/2018); Dialysis fistula creation (Left, 3/27/2019); other surgical history (05/28/2019); Endoscopy, colon, diagnostic; Catheter Removal (Right, 7/2/2019); and Aortic valve replacement (N/A, 10/15/2019).     Restrictions  Restrictions/Precautions  Restrictions/Precautions: General Precautions, Fall Risk, Seizure  Position Activity Restriction  Other position/activity restrictions: High fall risk per nursing assessment, no BP/needle sticks in LUE, telemetry, up with assistance  Vision/Hearing  Vision: Impaired  Vision Exceptions: Wears glasses for reading  Hearing: Exceptions to [facility]  Hearing Exceptions: Hard of hearing/hearing concerns     Subjective  General  Chart Reviewed: Yes  Patient assessed for rehabilitation services?: Yes  Additional Pertinent Hx: Chronic LBP, HD MWF, pt reports previous heart valve surgery  Family / Caregiver Present: No  Referring Practitioner: Dr. Walker Pineda  Referral Date : 10/11/21  Diagnosis: Acute L anterior thalamic CVA with encephalopathy  Follows Commands: Impaired (somewhat delayed response time 2* [place]/confusion)  General Comment  Comments: Pt resting in bed upon entry of therapy staff  Subjective  Subjective: Pt agreeable to work with PT/OT this morning. Pain Screening  Patient Currently in Pain: Yes  Pain Assessment  Pain Assessment: 0-10  Pain Level: 8  Pain Type: Chronic pain  Pain Location: Back  Pain Orientation: Lower  Pain Descriptors: Aching  Pain Frequency: Continuous  Non-Pharmaceutical Pain Intervention(s): Ambulation/Increased Activity;Repositioned; Emotional support    Vital Signs  Pulse: 88  Heart Rate Source: Monitor  BP: 135/71  BP Location: Right upper arm  Patient Position: Semi fowlers  Patient Currently in Pain: Yes  Oxygen Therapy  SpO2: 93 %  Pulse Oximeter Device Mode: Intermittent  Pulse Oximeter Device Location: Right;Finger  O2 Device: None (Room air)  Intervention List: Patient able to continue with treatment    Orientation  Orientation  Overall Orientation Status: Impaired  Orientation Level: Disoriented to time;Oriented to person;Oriented to place;Oriented to situation     Social/Functional History  Social/Functional History  Lives With: Spouse (wife (home with pt 24/7))  Type of Home: House  Home Layout: One level  Home Access: Stairs to enter with rails  Entrance Stairs - Number of Steps: 5 JOSIE with handrail (pt unsure if there is one or two)  Bathroom Shower/Tub: Walk-in shower  Bathroom Toilet: Standard  Bathroom Equipment: Grab bars in shower, Hand-held shower  Home Equipment: Grab bars, Kelin White, 4 wheeled walker  ADL Assistance: Independent  Homemaking Responsibilities: Yes  Meal Prep Responsibility: Secondary  Laundry Responsibility: Secondary  Cleaning Responsibility: Secondary  Shopping Responsibility: Secondary  Ambulation Assistance: Independent (without AD \"although I'm supposed to use something\"; pt reports only amb short household distances)  Transfer Assistance: Independent  Active : Yes  Occupation: Full time employment  Type of occupation:   Additional Comments: \"I fall all the time. \"  Pt estimates having fallen at least 6x in the past month. Objective  Observation/Palpation  Posture: Fair    RLE AROM: WFL  LLE AROM : WFL  Strength RLE: Grossly 4-/5 in knee, 4/5 ankle, 3+/5 hip  Strength LLE: Grossly 4-/5 in knee, 4/5 ankle, 3+/5 hip     Sensation  Overall Sensation Status: WFL     Bed mobility  Supine to Sit: Supervision (moving toward L side, HOB elevated ~30 degrees)  Scooting: Supervision (to scoot forward to EOB)     Transfers  Sit to Stand: 2 Person Assistance (modA x 2 from EOB to standing, Manny x 2 from toilet to standing)  Stand to sit: Moderate Assistance  Bed to Chair: 2 Person Assistance (min-modA x 2 without AD (with B hand-held assist), moving from bed>toilet>chair)     Ambulation  Surface: level tile  Device: No Device;Hand-Held Assist  Assistance: 2 Person assistance (modA x 2)  Quality of Gait: Pt amb with very slow ольга with wide ALANNAH and moderate unsteadiness. LOB x 3 during gait training, corrected with modA x 1-2 (usually LOB's were into posterior and L directions). Gait Deviations: Slow Ольга; Increased ALANNAH; Decreased step length;Decreased step height;Decreased arm swing;Shuffles  Distance: x 20 feet, x 30 feet    Balance  Posture: Fair  Sitting - Static: Good  Sitting - Dynamic: Good  Standing - Static: Fair;-  Standing - Dynamic: Poor;+  Comments: Pt very unsteady when standing, requiring min-modA x 2 for static standing balance initially, decreasing to Manny x 1 with repetition. ModA x 2 needed for balance during gait training. Plan   Times per week: 3-5x/week in acute care  Times per day: Daily  Specific instructions for Next Treatment: Progress ther ex and mobility as tolerated  Current Treatment Recommendations: Strengthening, Balance Training, Endurance Training, ROM, Functional Mobility Training, Transfer Training, Gait Training, Safety Education & Training, Patient/Caregiver Education & Training, Stair training, Home Exercise Program, Equipment Evaluation, Education, & procurement  Safety Devices: All fall risk precautions in place, Left in chair, Call light within reach, Chair alarm in place, Nurse notified, Gait belt, Patient at risk for falls    AM-PAC Score  AM-PAC Inpatient Mobility Raw Score : 15 (10/12/21 1112)  AM-PAC Inpatient T-Scale Score : 39.45 (10/12/21 1112)  Mobility Inpatient CMS 0-100% Score: 57.7 (10/12/21 1112)  Mobility Inpatient CMS G-Code Modifier : CK (10/12/21 1112)    Goals  Short term goals  Time Frame for Short term goals: 1 week, 10/19/21 (unless otherwise specified)  Short term goal 1: Pt will transfer supine <-> sit with modified(I)  Short term goal 2: Pt will transfer sit <-> stand and bed>chair with CGA x 1  Short term goal 3: Pt will ambulate x 75 feet using least AD with min/CGA x 1  Short term goal 4: By 10/14/21: Pt will tolerate 12-15 reps BLE exercise for strengthening, balance, and endurance  Patient Goals   Patient goals : \"To go home\"       Therapy Time   Individual Concurrent Group Co-treatment   Time In 0845         Time Out 0939         Minutes 54         Timed Code Treatment Minutes: [address] [place], [place] #[id number]    If pt is unable to be seen after this session, please let this note serve as discharge summary.   Please see case management note for discharge disposition. Thank you.

## 2021-10-12 NOTE — CARE COORDINATION
CASE MANAGEMENT INITIAL ASSESSMENT      Reviewed chart and completed assessment via telephone with: Pt's wife Rosina Atkins   Explained Case Management role/services. Health Care Decision Maker :   Primary Decision Maker: Crystal Ann - Spouse - [phone]        Admit date/status: 10/11/21   Diagnosis: Delirium   Is this a Readmission?:  Y pt was just here for slurred speech and left arm tingling here now for confusion     Insurance: Nicole Lucio my care [address] required for SNF: Y       3 night stay required: N    Living arrangements, Adls, care needs, prior to admission: Pt lives at home with his wife and is normally fairly independent of ADL'S     Transportation: Family, no preference      Durable Medical Equipment at home:  Walker_X_Cane__RTS__ BSC__Shower Chair__  02__ HHN__ CPAP_X_  BiPap__  Hospital Bed__ W/C___ Other_Scooter _________    Services in the home and/or outpatient, prior to admission: No active [address] [place] [address] (if applicable)   · Name: Magaly Velez   · Address:  · Dialysis Schedule: MWF at 1130  · Phone:  · Fax:    PT/OT recs: [facility] Exemption Notification (HEN): Needed     Barriers to discharge:None     Plan/comments:10/12/21 Spoke to pt's wife and she refuses SNF despite recs, per request a referral was made to [facility]. The plan will be home with [facility].  Current HD pt at [facility] on chart for MD signature

## 2021-10-12 NOTE — PROGRESS NOTES
10/12/21 0024   NIV Type   $NIV $Daily Charge   Equipment Type v60   Mode Bilevel   Mask Type Full face mask   Mask Size Large   Settings/Measurements   IPAP 16 cmH20   CPAP/EPAP 6 cmH2O   Rate Ordered 16   Resp 20   FiO2  30 %   Vt Exhaled 610 mL   Mask Leak (lpm) 9 lpm   Comfort Level Fair   Using Accessory Muscles No   Patient Observation   Observations mepilex on nose   Alarm Settings   Alarms On Y   Press Low Alarm 6 cmH2O   High Pressure Alarm 30 cmH2O   Resp Rate Low Alarm 6   High Respiratory Rate 40 br/min

## 2021-10-12 NOTE — PROGRESS NOTES
4 Eyes Skin Assessment      The patient is being assess for   Admission     I agree that 2 RN's have performed a thorough Head to Toe Skin Assessment on the patient. ALL assessment sites listed below have been assessed. Areas assessed by both nurses:   [x]   Head, Face, and Ears   [x]   Shoulders, Back, and Chest, Abdomen  [x]   Arms, Elbows, and Hands   [x]   Coccyx, Sacrum, and Ischium  [x]   Legs, Feet, and Heels         scattered bruises. Small black area and sloughing noted to right great toe and 2nd toe on right foot. **SHARE this note so that the co-signing nurse is able to place an eSignature**     Co-signer eSignature: Electronically signed by Kenna Lam RN on 10/12/21 at 8:57 AM EDT     Does the Patient have Skin Breakdown?   No          Isaac Prevention initiated:  Yes   Wound Care Orders initiated:  No      [facility] nurse consulted for Pressure Injury (Stage 3,4, Unstageable, DTI, NWPT, Complex wounds)and New or Established Ostomies:  No       Primary Nurse eSignature: Electronically signed by Padmini Chowdary RN on 10/12/21 at 8:52 AM EDT

## 2021-10-12 NOTE — PROGRESS NOTES
10/12/21 0800   NIV Type   NIV Started/Stopped On   Equipment Type v60   Mode Bilevel   Mask Type Full face mask   Mask Size Large   Settings/Measurements   IPAP 14 cmH20   CPAP/EPAP 6 cmH2O   Rate Ordered 16   Resp 16   FiO2  30 %   Vt Exhaled 343 mL   Minute Volume 5.5 Liters   Mask Leak (lpm) 53 lpm   Comfort Level Good   Using Accessory Muscles No   SpO2 100   Oxygen Therapy/Pulse Ox   O2 Therapy Oxygen   $Oxygen $Daily Charge   O2 Device PAP (positive airway pressure)   SpO2 100 %   $Pulse Oximeter $Spot check (multiple/continuous)

## 2021-10-12 NOTE — PROGRESS NOTES
10/12/21 0359   NIV Type   NIV Started/Stopped On   Equipment Type v60   Mode Bilevel   Mask Type Full face mask   Mask Size Large   Settings/Measurements   IPAP 14 cmH20   CPAP/EPAP 6 cmH2O   Rate Ordered 16   Resp 30   FiO2  30 %   Vt Exhaled 418 mL   Mask Leak (lpm) 12 lpm   Comfort Level Fair   Using Accessory Muscles No   Alarm Settings   Alarms On Y   Press Low Alarm 6 cmH2O   High Pressure Alarm 30 cmH2O   Resp Rate Low Alarm 6   High Respiratory Rate 40 br/min

## 2021-10-12 NOTE — CARE COORDINATION
[facility]    Referral received from CM to follow for home care services.    Pt marked do not readmit to [address] [place] [address] RN, BSN CTN  [facility] [phone]

## 2021-10-12 NOTE — PROGRESS NOTES
Speech Language Pathology    Bedside swallow & speech/lang/cog evaluations completed this date. Recommend initiating Regular solid diet with thin liquids, meds whole with thin liquid or whole with puree. Pt presents with severe cognitive deficits. Full evaluation reports to follow. RN aware of recs. Pt would benefit from continued ST to address speech/lang/cog & diet tolerance monitoring.     Shi Chapman M.S. [address] [facility]  Speech-language pathologist  [id number]  Speech Desk Phone: [phone]

## 2021-10-12 NOTE — PLAN OF CARE
Problem: Neurological  Intervention: Speech Evaluation/treatment  Note: Speech/lang/cog evaluation completed this date. Salvador Fisher M.S. [address] [facility]  Speech-language pathologist  [id number]         Problem: Nutrition  Intervention: Swallowing evaluation  Note: Bedside swallow evaluation completed this date. Salvador Fisher M.S. [address] [facility]  Speech-language pathologist  [id number]      Intervention: Aspiration precautions  Note: Bedside swallow evaluation completed this date.     Salvador Fisher M.S. [address] [facility]  Speech-language pathologist  [phone]

## 2021-10-12 NOTE — PROGRESS NOTES
Patient continues to move around in bed, throwing legs over bed rails, pulling off bipap. Wife now present in room to help calm him down. Responds to wife and restlessness decreases.

## 2021-10-12 NOTE — PROGRESS NOTES
Oswaldo Hernandez. [address] [facility] or Facility: A From: Nacho Gibbs RE: Mitchel Swan 1970 RM: [phone]     Patient having bright red stools. Had a couple soft brown one initially around 1730.      Need Callback: NO CALLBACK REQ C5 MED Yohan Layne / Maycol Landa

## 2021-10-12 NOTE — PROGRESS NOTES
Occupational Therapy   Occupational Therapy Initial Assessment and treatment    Date: 10/12/2021   Patient Name: Gonzalez Angel  MRN: [id number]     : 1970    Date of Service: 10/12/2021    Discharge Recommendations:  Subacute/Skilled Nursing Facility       Assessment   Performance deficits / Impairments: Decreased functional mobility ; Decreased ADL status; Decreased strength;Decreased safe awareness;Decreased cognition;Decreased endurance;Decreased balance  Assessment: Pt admitted with AMS, currently intermittnetly confused but follows commands with redirection. Pt currently requires extensive assist for ADLs and 2 person assist for transfers/mobility with decreased balance/safety. Recommend SNF prior to returning home to decrease caregiver burden. Prognosis: Fair  OT Education: OT Role;Plan of Care;ADL Adaptive Strategies;Transfer Training;Energy Conservation  Patient Education: disease specific: ADLs, transfers, safety, transfers/mobility  REQUIRES OT FOLLOW UP: Yes  Activity Tolerance  Activity Tolerance: Treatment limited secondary to decreased cognition  Activity Tolerance: 135/71 HR 88  Safety Devices  Safety Devices in place: Yes  Type of devices: Gait belt;Call light within reach; Left in chair;Chair alarm in place;Nurse notified           Patient Diagnosis(es): The primary encounter diagnosis was Cerebrovascular accident (CVA) due to other mechanism ([facility] [address] [address] Diagnoses of ESRD (end stage renal disease) on dialysis [facility]) and Delirium were also pertinent to this visit.      has a past medical history of Ambulatory dysfunction, Aortic stenosis, Arthritis, Asthma, Bilateral hilar adenopathy syndrome, CAD (coronary artery disease), Cardiomyopathy ([address] CHF (congestive heart failure) ([address] Chronic pain, COPD (chronic obstructive pulmonary disease) ([address] Depression, Diabetes mellitus ([address] Difficult intravenous access, Emphysema of lung ([address] ESRD (end stage renal disease) on dialysis ([facility] [address] Fear of needles, Gastric ulcer, GERD (gastroesophageal reflux disease), Heart valve problem, Hemodialysis patient ([address] History of spinal fracture, Hx of blood clots, Hyperlipidemia, Hypertension, MI (myocardial infarction) ([facility] [address] Neuromuscular disorder ([facility] [address] Numbness and tingling in left arm, Pneumonia, PONV (postoperative nausea and vomiting), Prolonged emergence from general anesthesia, Sleep apnea, Stroke ([address] TIA (transient ischemic attack), and Unspecified diseases of blood and blood-forming organs. has a past surgical history that includes Tonsillectomy; cyst removal (08/14/2013); Colonoscopy; Coronary angioplasty with stent (05/26/15); vascular surgery (aprx 2 years ago); Colonoscopy (2/29/2015); Upper gastrointestinal endoscopy (01/06/2016); Upper gastrointestinal endoscopy (01/29/2017); other surgical history (02/01/2017); Dialysis fistula creation (Left, 10/30/2017); Diagnostic Cardiac Cath Lab Procedure; other surgical history (2018); other surgical history (Bilateral, 06/26/2018); pr lap insertion tunneled intraperitoneal catheter (N/A, 9/21/2018); other surgical history (Right, 09/2018); Dialysis fistula creation (Left, 3/27/2019); other surgical history (05/28/2019); Endoscopy, colon, diagnostic; Catheter Removal (Right, 7/2/2019); and Aortic valve replacement (N/A, 10/15/2019).            Restrictions  Restrictions/Precautions  Restrictions/Precautions: General Precautions, Fall Risk, Seizure  Position Activity Restriction  Other position/activity restrictions: High fall risk per nursing assessment, no BP/needle sticks in LUE, telemetry, up with assistance    Subjective   General  Chart Reviewed: Yes  Patient assessed for rehabilitation services?: Yes  Family / Caregiver Present: No  Referring Practitioner: Lillian Markham MD  Diagnosis: AMS  Subjective  Subjective: Pt supine, agreeable  General Comment  Comments: RN clears for therapy  Vital Signs  Temp: 98.3 °F (36.8 °C)  Temp Source: Oral  Pulse: 89  Heart Rate Source: Monitor  Resp: 16  BP: (!) 163/82  BP Location: Right upper arm  MAP (mmHg): 109  Patient Position: Sitting  Level of Consciousness: Responds to Voice (1)  Oxygen Therapy  SpO2: 95 %  O2 Device: None (Room air)  Social/Functional History  Social/Functional History  Lives With: Spouse (wife (home with pt 24/7))  Type of Home: House  Home Layout: One level  Home Access: Stairs to enter with rails  Entrance Stairs - Number of Steps: 5 JOSIE with handrail (pt unsure if there is one or two)  Bathroom Shower/Tub: Walk-in shower  Bathroom Toilet: Standard  Bathroom Equipment: Grab bars in shower, Hand-held shower  Home Equipment: Grab bars, Cane, 4 wheeled walker  ADL Assistance: Independent  Homemaking Responsibilities: Yes  Meal Prep Responsibility: Secondary  Laundry Responsibility: Secondary  Cleaning Responsibility: Secondary  Shopping Responsibility: Secondary  Ambulation Assistance: Independent (without AD \"although I'm supposed to use something\"; pt reports only amb short household distances)  Transfer Assistance: Independent  Active : Yes  Occupation: Full time employment  Type of occupation:   Additional Comments: \"I fall all the time. \"  Pt estimates having fallen at least 6x in the past month. Objective        Orientation  Overall Orientation Status: Within Functional Limits  Observation/Palpation  Posture: Fair  Balance  Sitting Balance: Supervision  Standing Balance: Dependent/Total  Standing Balance  Time: 2-3 min x 2  Activity: to/from restroom  Functional Mobility  Functional - Mobility Device: No device  Activity: To/from bathroom  Assist Level: Dependent/Total  Functional Mobility Comments: mod A x 2 with HHA to/from restroom  ADL  Grooming:  Moderate assistance  UE Dressing: Maximum assistance  LE Dressing: Dependent/Total  Toileting: Maximum assistance (clothing mgmt up/down)  Tone RUE  RUE Tone: Normotonic  Tone LUE  LUE Tone: Normotonic  Coordination  Movements B UE ex x 20 reps w/o fatigue  Patient Goals   Patient goals : \"Go home\"       Therapy Time   Individual Concurrent Group Co-treatment   Time In 0845         Time Out 0939         Minutes 54         Timed Code Treatment Minutes: 44 Minutes (10 min eval)   If pt discharges prior to next session, this note will serve as discharge summary. See case management note for discharge disposition.         Isabelle Harvey, OT

## 2021-10-12 NOTE — CONSULTS
[facility] Consult Note           Reason for Consult:  End stage renal disease  Requesting Physician:  Dr. Wilda Sofia    Chief Complaint:  Altered mental status  History Obtained From:  patient, electronic medical record    History of Present Ilness:    46year old male with ESRD on HD MWF admitted with altered mental status. We have been asked to assist in further dialysis care. Last had HD on 10/8, only ran for 101 minutes, 900 ml removed, post-weight of 118 kg. Brought in by family due to altered mental status, hallucinations, garbled speech. Recently admitted last month for right cerebellar infarct. Denies any shortness of breath, chest pain, abdominal pain, fevers, intake adequate.     Past Medical History:        Diagnosis Date    Ambulatory dysfunction     walker for long distances, SOB with distance    Aortic stenosis     echo 2017    Arthritis     hands and hips    Asthma     Bilateral hilar adenopathy syndrome 6/3/2013    CAD (coronary artery disease)     Dr. Vu Miner [facility]) 04/19/2019    EF= 43%    CHF (congestive heart failure) (HCC)     Chronic pain     COPD (chronic obstructive pulmonary disease) ([address]     pulmonology Dr. Hill Client    Depression     Diabetes mellitus ([address]     borderline    Difficult intravenous access     Emphysema of lung ([address]     ESRD (end stage renal disease) on dialysis ([address]     MWF    Fear of needles     Gastric ulcer     GERD (gastroesophageal reflux disease)     Heart valve problem     bicuspic valve    Hemodialysis patient ([address]     History of spinal fracture     work incident    Hx of blood clots     Bilateral lower extremities; stents in place    Hyperlipidemia     Hypertension     MI (myocardial infarction) ([address] 2019    has had 9 MIs. 2019 was the last    Neuromuscular disorder ([address]     due to CVA    Numbness and tingling in left arm     from fistula    Pneumonia  PONV (postoperative nausea and vomiting)     Prolonged emergence from general anesthesia     States requires more medication than most people    Sleep apnea     Uses CPAP    Stroke ([facility]     7mm thalamic cva 2017 deficts left side, left side weakness    TIA (transient ischemic attack)     Unspecified diseases of blood and blood-forming organs        Past Surgical History:        Procedure Laterality Date    AORTIC VALVE REPLACEMENT N/A 10/15/2019    TRANSCATHETER AORTIC VALVE REPLACEMENT FEMORAL APPROACH performed by Olga White MD at [address] [place] [address] Ave Right 7/2/2019    PERITONEAL DIALYSIS CATHETER REMOVAL performed by Gus Daniel MD at [facility] COLONOSCOPY  2/29/2015    WN    CORONARY ANGIOPLASTY WITH STENT PLACEMENT  05/26/15    CYST REMOVAL  08/14/2013    EXCISION CYSTS, NECK X2 AND ABDOMINAL benign    DIAGNOSTIC CARDIAC CATH LAB PROCEDURE      DIALYSIS FISTULA CREATION Left 10/30/2017    LEFT BRACHIAL CEPHALIC FISTULA    DIALYSIS FISTULA CREATION Left 3/27/2019    LIGATION  AV FISTULA performed by Naida Colindres MD at [facility], COLON, DIAGNOSTIC      OTHER SURGICAL HISTORY  02/01/2017    laparoscopic cholecystectomy with intraoperative cholangiogram    OTHER SURGICAL HISTORY  2018    PORT PLACEMENT  - vas cath    OTHER SURGICAL HISTORY Bilateral 06/26/2018    laprascopic peritoneal dialysis catheter placement    OTHER SURGICAL HISTORY Right 09/2018    peritoneal dialysis port placed on right side of abdomen    OTHER SURGICAL HISTORY  05/28/2019    PTA/Stenting R External Iliac artery    VT LAP INSERTION TUNNELED INTRAPERITONEAL CATHETER N/A 9/21/2018    LAPAROSCOPIC PERITONEAL DIALYSIS CATHETER REPLACEMENT performed by Gus Daniel MD at [address] [place] [address]  01/06/2016    UPPER GASTROINTESTINAL ENDOSCOPY  01/29/2017    possible candida, otherwise normal appearing    VASCULAR SURGERY  aprx 2 years ago    2 stents placed, each side of groin       Home Medications:    No current facility-administered medications on file prior to encounter. Current Outpatient Medications on File Prior to Encounter   Medication Sig Dispense Refill    Continuous Blood Gluc Sensor (DEXCOM G6 SENSOR) MISC Every 10 days 9 each 3    Continuous Blood Gluc Transmit (DEXCOM G6 TRANSMITTER) MISC 1 each by Does not apply route every 3 months 1 each 3    Continuous Blood Gluc  (DEXCOM G6 ) ADAM 1 each by Does not apply route Daily with lunch 1 each 0    hydrALAZINE (APRESOLINE) 50 MG tablet TAKE 1/2 TABLET BY MOUTH EVERY 8 HOURS Take extra 1/2 for blood pressure greater than 160/100 120 tablet 2    DULoxetine (CYMBALTA) 60 MG extended release capsule TAKE 1 CAPSULE BY MOUTH EVERY DAY 30 capsule 5    carvedilol (COREG) 12.5 MG tablet TAKE 1 TABLET BY MOUTH TWICE DAILY WITH MEALS 60 tablet 10    traZODone (DESYREL) 150 MG tablet TAKE ONE (1) TABLET BY MOUTH NIGHTLY 30 tablet 10    dilTIAZem (CARDIZEM CD) 180 MG extended release capsule TAKE 1 CAPSULE BY MOUTH DAILY 30 capsule 10    pravastatin (PRAVACHOL) 40 MG tablet TAKE 1 TABLET BY MOUTH EACH EVENING 30 tablet 10    B Complex-C-Folic Acid (VIRT-CAPS) 1 MG CAPS TAKE 1 CAPSULE BY MOUTH EVERY DAY 90 capsule 1    oxyCODONE-acetaminophen (PERCOCET) 7.5-325 MG per tablet Take 1 tablet by mouth every 4-6 hours as needed for Pain for up to 30 days.  150 tablet 0    losartan (COZAAR) 25 MG tablet Take 1 tablet by mouth daily 30 tablet 3    torsemide (DEMADEX) 100 MG tablet Take 1 tablet by mouth daily 30 tablet 3    isosorbide mononitrate (IMDUR) 30 MG extended release tablet TAKE 1 TABLET BY MOUTH EVERY DAY 30 tablet 10    pantoprazole (PROTONIX) 40 MG tablet TAKE (1) TABLET BY MOUTH EACH MORNING BEFORE BREAKFAST 30 tablet 1    Calcium Acetate, Phos Binder, 667 MG CAPS TAKE 1 CAPSULE BY MOUTH THREE TIMES DAILY WITH MEALS 90 capsule 3    QUEtiapine (SEROQUEL) 50 MG tablet TAKE (1) TABLET BY MOUTH IN THE EVENING 30 tablet 2    BASAGLAR KWIKPEN 100 UNIT/ML injection pen ADMINISTER 60 UNITS UNDER THE SKIN EVERY NIGHT 15 mL 5    albuterol (PROVENTIL) (2.5 MG/3ML) 0.083% nebulizer solution INHALE 1 VIAL VIA NEBULIZER EVERY 6 HOURS AS NEEDED FOR WHEEZING 300 mL 5    famotidine (PEPCID) 20 MG tablet TAKE 1 TABLET BY MOUTH TWICE DAILY AS NEEDED FOR HEARTBURN 180 tablet 0    hydrOXYzine (VISTARIL) 50 MG capsule TAKE 1 TO 2 CAPSULES BY MOUTH NIGHTLY 60 capsule 5    gabapentin (NEURONTIN) 100 MG capsule TAKE 1 TO 2 CAPSULES BY MOUTH THREE TIMES A  capsule 5    LINZESS 145 MCG capsule TAKE 1 CAPSULE BY MOUTH EVERY MORNING BEFORE BREAKFAST 30 capsule 10    cyclobenzaprine (FLEXERIL) 10 MG tablet TAKE 1 TABLET BY MOUTH EVERY 8 HOURS AS NEEDED 90 tablet 5    tiZANidine (ZANAFLEX) 4 MG tablet TAKE 1 TABLET BY MOUTH THREE TIMES DAILY (Patient not taking: Reported on 10/5/2021) 90 tablet 10    simethicone (MYLICON) 80 MG chewable tablet Take 1 tablet by mouth every 6 hours as needed (cramping) (Patient not taking: Reported on 10/5/2021) 15 tablet 0    glucose monitoring kit (FREESTYLE) monitoring kit 1 kit by Does not apply route daily (Patient not taking: Reported on 10/5/2021) 1 kit 0    blood glucose test strips (GLUCOSE METER TEST) strip 1 each by In Vitro route 5 times daily As needed. 100 each 3    nitroGLYCERIN (NITROSTAT) 0.4 MG SL tablet DISSOLVE 1 TABLET UNDER THE TONGUE AS NEEDED FOR CHEST PAIN EVERY 5 MINUTES UP TO 3 TIMES. IF NO RELIEF CALL 911. 25 tablet 10    insulin aspart (NOVOLOG FLEXPEN) 100 UNIT/ML injection pen Inject 20 Units into the skin 3 times daily (before meals) 15 pen 5    ondansetron (ZOFRAN ODT) 4 MG disintegrating tablet Take 1 tablet by mouth every 8 hours as needed for Nausea (Patient not taking: Reported on 10/5/2021) 60 tablet 0    blood glucose test strips (FREESTYLE LITE) strip Daily As needed.  100 strip 3    glucose monitoring kit (FREESTYLE) monitoring kit 1 kit by Does not apply route daily (Patient not taking: Reported on 10/5/2021) 1 kit 0    vitamin D (ERGOCALCIFEROL) 97548 units CAPS capsule TK 1 C PO WEEKLY  11    Tiotropium Bromide-Olodaterol (STIOLTO RESPIMAT) 2.5-2.5 MCG/ACT AERS Inhale 2 puffs into the lungs daily 2 Inhaler 0    Polyethylene Glycol 3350 GRAN       Glucose Blood (BLOOD GLUCOSE TEST STRIPS) STRP TEST 3-4 TIMES DAILY, AS DIRECTED (Patient not taking: Reported on 10/5/2021) 100 strip 3    Blood Glucose Monitoring Suppl ADAM USE AS DIRECTED. 1 Device 0    Alcohol Swabs PADS USE AS DIRECTED 300 each 3    albuterol sulfate  (90 Base) MCG/ACT inhaler Inhale 2 puffs into the lungs every 6 hours as needed for Wheezing 1 Inhaler 3    ipratropium-albuterol (DUONEB) 0.5-2.5 (3) MG/3ML SOLN nebulizer solution Inhale 3 mLs into the lungs every 6 hours as needed for Shortness of Breath 360 mL 1    calcium carbonate (TUMS) 500 MG chewable tablet Take 1 tablet by mouth 3 times daily as needed for Heartburn.  aspirin 81 MG chewable tablet Take 1 tablet by mouth daily. (Patient taking differently: Take 81 mg by mouth daily Indications: stopped on  for surgery ) 30 tablet 2       Allergies:  Morphine    Social History:    Social History     Socioeconomic History    Marital status:      Spouse name: Not on file    Number of children: Not on file    Years of education: Not on file    Highest education level: Not on file   Occupational History    Not on file   Tobacco Use    Smoking status: Current Every Day Smoker     Packs/day: 0.50     Years: 33.00     Pack years: 16.50     Types: Cigarettes     Last attempt to quit: 2020     Years since quittin.4    Smokeless tobacco: Never Used   Vaping Use    Vaping Use: Never used   Substance and Sexual Activity    Alcohol use: Not Currently     Alcohol/week: 0.0 standard drinks     Comment: occ    Drug use:  No  Sexual activity: Yes     Partners: Female     Comment:    Other Topics Concern    Not on file   Social History Narrative    Not on file     Social Determinants of Health     Financial Resource Strain:     Difficulty of Paying Living Expenses:    Food Insecurity:     Worried About Running Out of Food in the Last Year:     [address] in the Last Year:    Transportation Needs:     Lack of Transportation (Medical):  Lack of Transportation (Non-Medical):    Physical Activity:     Days of Exercise per Week:     Minutes of Exercise per Session:    Stress:     Feeling of Stress :    Social Connections:     Frequency of Communication with Friends and Family:     Frequency of Social Gatherings with Friends and Family:     Attends Sikh Services:     Active Member of Clubs or Organizations:     Attends Club or Organization Meetings:     Marital Status:    Intimate Partner Violence:     Fear of Current or Ex-Partner:     Emotionally Abused:     Physically Abused:     Sexually Abused:        Family History:   Family History   Problem Relation Age of Onset    Diabetes Mother     Heart Disease Father     Kidney Disease Sister         stage 4-kidney failure    Cancer Sister     Heart Disease Sister     Obesity Sister     Cancer Sister     Heart Disease Sister     Obesity Sister     Alcohol Abuse Brother        Review of Systems:   Pertinent positives stated above in HPI. Remainder of 10 point review of systems were reviewed and were negative.     Physical exam:   Constitutional:  VITALS:  /71   Pulse 89   Temp 98.4 °F (36.9 °C)   Resp 16   Ht 5' 8\" (1.727 m)   Wt 256 lb 6.3 oz (116.3 kg)   SpO2 95%   BMI 38.98 kg/m²   Gen: alert, awake, ill-appearing  Skin: no rash, turgor wnl  Heent:  eomi, mmm  Neck: no bruits or jvd noted, thyroid normal  Cardiovascular:  S1, S2 without m/r/g  Respiratory: CTA B without w/r/r; respiratory effort normal  Abdomen:  +bs, soft, nt, nd, no hepatosplenomegaly  Ext: + lower extremity edema  Access: Left [facility] [place]  Psychiatric: mood and affect appropriate; judgement and insight intact  Musculoskeletal:  Rom, muscular strength limited; digits, nails normal    Data/  CBC:   Lab Results   Component Value Date    WBC 18.5 10/12/2021    RBC 3.87 10/12/2021    HGB 11.3 10/12/2021    HCT 35.1 10/12/2021    MCV 90.8 10/12/2021    MCH 29.1 10/12/2021    MCHC 32.1 10/12/2021    RDW 15.7 10/12/2021     10/12/2021    MPV 7.9 10/12/2021     BMP:    Lab Results   Component Value Date     10/12/2021    K 5.1 10/12/2021     10/12/2021    CO2 22 10/12/2021    BUN 79 10/12/2021    LABALBU 3.9 10/12/2021    CREATININE 8.1 10/12/2021    CALCIUM 9.4 10/12/2021    GFRAA 8 10/12/2021    GFRAA >60 05/17/2013    LABGLOM 7 10/12/2021    GLUCOSE 78 10/12/2021         Assessment/    -ESRD on HD MWF    -Recent CVA/Acute metabolic encephalopathy - plans per Neurology    -Hypertension - high on admission, now within range    -Anemia of chronic disease - Hb 11.3     -Leukocytosis      Plan/    - HD today given missed HD yesterday -  kg, plan for again tomorrow per schedule  - Holding on any DAVON dosing   - Trend labs, bp's, blood cultures      Thank you for the consultation. Please do not hesitate to call with questions. ____________________________________  Gabbie Motta MD  [facility]  [email or web address]  Office: [phone]

## 2021-10-13 ENCOUNTER — APPOINTMENT (OUTPATIENT)
Dept: MRI IMAGING | Age: 51
DRG: 064 | End: 2021-10-13
Payer: COMMERCIAL

## 2021-10-13 LAB
ALBUMIN SERPL-MCNC: 3.2 G/DL (ref 3.4–5)
ANION GAP SERPL CALCULATED.3IONS-SCNC: 17 MMOL/L (ref 3–16)
BUN BLDV-MCNC: 67 MG/DL (ref 7–20)
CALCIUM SERPL-MCNC: 8.8 MG/DL (ref 8.3–10.6)
CHLORIDE BLD-SCNC: 103 MMOL/L (ref 99–110)
CO2: 20 MMOL/L (ref 21–32)
CREAT SERPL-MCNC: 7.2 MG/DL (ref 0.9–1.3)
GFR AFRICAN AMERICAN: 10
GFR NON-AFRICAN AMERICAN: 8
GLUCOSE BLD-MCNC: 121 MG/DL (ref 70–99)
GLUCOSE BLD-MCNC: 31 MG/DL (ref 70–99)
GLUCOSE BLD-MCNC: 52 MG/DL (ref 70–99)
GLUCOSE BLD-MCNC: 56 MG/DL (ref 70–99)
GLUCOSE BLD-MCNC: 58 MG/DL (ref 70–99)
GLUCOSE BLD-MCNC: 68 MG/DL (ref 70–99)
GLUCOSE BLD-MCNC: 77 MG/DL (ref 70–99)
GLUCOSE BLD-MCNC: 98 MG/DL (ref 70–99)
HCT VFR BLD CALC: 32.2 % (ref 40.5–52.5)
HEMOGLOBIN: 10.6 G/DL (ref 13.5–17.5)
MAGNESIUM: 2.2 MG/DL (ref 1.8–2.4)
MCH RBC QN AUTO: 29.7 PG (ref 26–34)
MCHC RBC AUTO-ENTMCNC: 33.1 G/DL (ref 31–36)
MCV RBC AUTO: 89.7 FL (ref 80–100)
PDW BLD-RTO: 15.6 % (ref 12.4–15.4)
PERFORMED ON: ABNORMAL
PERFORMED ON: NORMAL
PERFORMED ON: NORMAL
PHOSPHORUS: 7.2 MG/DL (ref 2.5–4.9)
PLATELET # BLD: 220 K/UL (ref 135–450)
PMV BLD AUTO: 8.3 FL (ref 5–10.5)
POTASSIUM SERPL-SCNC: 3.9 MMOL/L (ref 3.5–5.1)
RBC # BLD: 3.59 M/UL (ref 4.2–5.9)
SODIUM BLD-SCNC: 140 MMOL/L (ref 136–145)
TROPONIN: 0.15 NG/ML
TROPONIN: 0.17 NG/ML
TROPONIN: 0.18 NG/ML
TROPONIN: 0.18 NG/ML
WBC # BLD: 12.1 K/UL (ref 4–11)

## 2021-10-13 PROCEDURE — 6360000002 HC RX W HCPCS: Performed by: INTERNAL MEDICINE

## 2021-10-13 PROCEDURE — 70551 MRI BRAIN STEM W/O DYE: CPT

## 2021-10-13 PROCEDURE — 83735 ASSAY OF MAGNESIUM: CPT

## 2021-10-13 PROCEDURE — 97116 GAIT TRAINING THERAPY: CPT

## 2021-10-13 PROCEDURE — 94660 CPAP INITIATION&MGMT: CPT

## 2021-10-13 PROCEDURE — 6370000000 HC RX 637 (ALT 250 FOR IP): Performed by: NURSE PRACTITIONER

## 2021-10-13 PROCEDURE — 2580000003 HC RX 258: Performed by: HOSPITALIST

## 2021-10-13 PROCEDURE — 84484 ASSAY OF TROPONIN QUANT: CPT

## 2021-10-13 PROCEDURE — 6360000002 HC RX W HCPCS: Performed by: HOSPITALIST

## 2021-10-13 PROCEDURE — 90935 HEMODIALYSIS ONE EVALUATION: CPT

## 2021-10-13 PROCEDURE — 97530 THERAPEUTIC ACTIVITIES: CPT

## 2021-10-13 PROCEDURE — 6370000000 HC RX 637 (ALT 250 FOR IP): Performed by: HOSPITALIST

## 2021-10-13 PROCEDURE — 80069 RENAL FUNCTION PANEL: CPT

## 2021-10-13 PROCEDURE — 2500000003 HC RX 250 WO HCPCS: Performed by: HOSPITALIST

## 2021-10-13 PROCEDURE — 94640 AIRWAY INHALATION TREATMENT: CPT

## 2021-10-13 PROCEDURE — 36415 COLL VENOUS BLD VENIPUNCTURE: CPT

## 2021-10-13 PROCEDURE — 85027 COMPLETE CBC AUTOMATED: CPT

## 2021-10-13 PROCEDURE — 99233 SBSQ HOSP IP/OBS HIGH 50: CPT | Performed by: NURSE PRACTITIONER

## 2021-10-13 PROCEDURE — 97535 SELF CARE MNGMENT TRAINING: CPT

## 2021-10-13 PROCEDURE — 1200000000 HC SEMI PRIVATE

## 2021-10-13 RX ORDER — HEPARIN SODIUM 1000 [USP'U]/ML
INJECTION, SOLUTION INTRAVENOUS; SUBCUTANEOUS
Status: DISPENSED
Start: 2021-10-13 | End: 2021-10-14

## 2021-10-13 RX ORDER — LORAZEPAM 2 MG/ML
1 INJECTION INTRAMUSCULAR EVERY 4 HOURS PRN
Status: DISCONTINUED | OUTPATIENT
Start: 2021-10-13 | End: 2021-10-15 | Stop reason: HOSPADM

## 2021-10-13 RX ORDER — OXYCODONE HYDROCHLORIDE AND ACETAMINOPHEN 5; 325 MG/1; MG/1
1 TABLET ORAL EVERY 6 HOURS PRN
Status: COMPLETED | OUTPATIENT
Start: 2021-10-13 | End: 2021-10-14

## 2021-10-13 RX ADMIN — SODIUM CHLORIDE, PRESERVATIVE FREE 10 ML: 5 INJECTION INTRAVENOUS at 21:25

## 2021-10-13 RX ADMIN — TIOTROPIUM BROMIDE AND OLODATEROL 2 PUFF: 3.124; 2.736 SPRAY, METERED RESPIRATORY (INHALATION) at 08:34

## 2021-10-13 RX ADMIN — DEXTROSE MONOHYDRATE 12.5 G: 25 INJECTION, SOLUTION INTRAVENOUS at 09:14

## 2021-10-13 RX ADMIN — ONDANSETRON 4 MG: 2 INJECTION INTRAMUSCULAR; INTRAVENOUS at 21:23

## 2021-10-13 RX ADMIN — Medication 1 TABLET: at 09:20

## 2021-10-13 RX ADMIN — THIAMINE HYDROCHLORIDE 100 MG: 100 INJECTION, SOLUTION INTRAMUSCULAR; INTRAVENOUS at 09:20

## 2021-10-13 RX ADMIN — SODIUM CHLORIDE, PRESERVATIVE FREE 10 ML: 5 INJECTION INTRAVENOUS at 09:25

## 2021-10-13 RX ADMIN — GABAPENTIN 100 MG: 100 CAPSULE ORAL at 14:28

## 2021-10-13 RX ADMIN — OXYCODONE AND ACETAMINOPHEN 1 TABLET: 5; 325 TABLET ORAL at 21:23

## 2021-10-13 RX ADMIN — Medication 15 G: at 08:53

## 2021-10-13 RX ADMIN — SODIUM CHLORIDE, PRESERVATIVE FREE 10 ML: 5 INJECTION INTRAVENOUS at 21:24

## 2021-10-13 RX ADMIN — LORAZEPAM 1 MG: 2 INJECTION INTRAMUSCULAR; INTRAVENOUS at 10:58

## 2021-10-13 RX ADMIN — GABAPENTIN 100 MG: 100 CAPSULE ORAL at 09:20

## 2021-10-13 RX ADMIN — CARVEDILOL 12.5 MG: 3.12 TABLET, FILM COATED ORAL at 09:20

## 2021-10-13 RX ADMIN — LORAZEPAM 1 MG: 2 INJECTION INTRAMUSCULAR; INTRAVENOUS at 15:13

## 2021-10-13 RX ADMIN — ASPIRIN 81 MG: 81 TABLET, COATED ORAL at 09:20

## 2021-10-13 RX ADMIN — DEXTROSE MONOHYDRATE 12.5 G: 25 INJECTION, SOLUTION INTRAVENOUS at 21:12

## 2021-10-13 RX ADMIN — SODIUM CHLORIDE, PRESERVATIVE FREE 10 ML: 5 INJECTION INTRAVENOUS at 21:13

## 2021-10-13 RX ADMIN — TORSEMIDE 100 MG: 100 TABLET ORAL at 09:20

## 2021-10-13 RX ADMIN — LOSARTAN POTASSIUM 25 MG: 25 TABLET, FILM COATED ORAL at 09:20

## 2021-10-13 RX ADMIN — ISOSORBIDE MONONITRATE 30 MG: 30 TABLET, EXTENDED RELEASE ORAL at 09:25

## 2021-10-13 RX ADMIN — PANTOPRAZOLE SODIUM 40 MG: 40 TABLET, DELAYED RELEASE ORAL at 05:13

## 2021-10-13 RX ADMIN — OXYCODONE AND ACETAMINOPHEN 1 TABLET: 5; 325 TABLET ORAL at 05:14

## 2021-10-13 RX ADMIN — DULOXETINE HYDROCHLORIDE 60 MG: 60 CAPSULE, DELAYED RELEASE ORAL at 09:20

## 2021-10-13 ASSESSMENT — PAIN SCALES - GENERAL
PAINLEVEL_OUTOF10: 7
PAINLEVEL_OUTOF10: 8

## 2021-10-13 NOTE — CONSULTS
[address] 53 1970    History:  Past Medical History:   Diagnosis Date    Ambulatory dysfunction     walker for long distances, SOB with distance    Aortic stenosis     echo 2017    Arthritis     hands and hips    Asthma     Bilateral hilar adenopathy syndrome 6/3/2013    CAD (coronary artery disease)     Dr. Jona Dempsey [facility]) 04/19/2019    EF= 43%    CHF (congestive heart failure) (HCC)     Chronic pain     COPD (chronic obstructive pulmonary disease) ([address]     pulmonology Dr. Torin Garcia    Depression     Diabetes mellitus ([address]     borderline    Difficult intravenous access     Emphysema of lung ([address]     ESRD (end stage renal disease) on dialysis ([address]     MWF    Fear of needles     Gastric ulcer     GERD (gastroesophageal reflux disease)     Heart valve problem     bicuspic valve    Hemodialysis patient ([address]     History of spinal fracture     work incident    Hx of blood clots     Bilateral lower extremities; stents in place    Hyperlipidemia     Hypertension     MI (myocardial infarction) ([address] 2019    has had 9 MIs. 2019 was the last    Neuromuscular disorder ([facility]     due to CVA    Numbness and tingling in left arm     from fistula    Pneumonia     PONV (postoperative nausea and vomiting)     Prolonged emergence from general anesthesia     States requires more medication than most people    Sleep apnea     Uses CPAP    Stroke ([facility]     7mm thalamic cva 2017 deficts left side, left side weakness    TIA (transient ischemic attack)     Unspecified diseases of blood and blood-forming organs        ECHO:  9/11/21  EF 40-45%  HgA1C:  10/12/21  9.3    ACE/ARB: Losartan 25 mg daily    BB: Carvedilol 12.5 mg BID    Imdur 30mg daily    Last Hospital Admission:  9/7/21  Speech problem  Discharge plans:  Per case management documentation pt wife declining SNF despite recommendations.   Plans are for home with Srinivasan  and HD    Family Present:  Chart review completed   Advanced Directives: patient has advance directives scanned in the chart    Chart review completed. Patient a 46year old male, admitted for delerium. Hospital day 2, currently on C5. Pt remains with altered mental status. Unable to do CHF education at this time. Hospital follow up appt made for 10/20/21 with Saranya Interiano NP at 1pm.  Will follow and would be glad to assist when appropriate.      Patient recent weights and intake/output reviewed:    Patient Vitals for the past 96 hrs (Last 3 readings):   Weight   10/12/21 1428 268 lb 15.4 oz (122 kg)   10/12/21 1250 271 lb 9.7 oz (123.2 kg)   10/12/21 0407 256 lb 6.3 oz (116.3 kg)         Intake/Output Summary (Last 24 hours) at 10/13/2021 1458  Last data filed at 10/13/2021 0919  Gross per 24 hour   Intake 240 ml   Output --   Net 240 ml         Education Time: Chart review only      Abisai Romero, RN     10/13/2021 2:58 PM

## 2021-10-13 NOTE — PROGRESS NOTES
10/13/21 0807   NIV Type   NIV Started/Stopped On   Equipment Type v60   Mode Bilevel   Mask Type Nasal mask   Mask Size Large   Settings/Measurements   IPAP 14 cmH20   CPAP/EPAP 6 cmH2O   FiO2  30 %   Vt Exhaled 482 mL   Mask Leak (lpm) 52 lpm   Comfort Level Good   Using Accessory Muscles No   Alarm Settings   Alarms On Y   Press Low Alarm 6 cmH2O   High Pressure Alarm 30 cmH2O   Apnea (secs) 20 secs   Resp Rate Low Alarm 6   High Respiratory Rate 40 br/min

## 2021-10-13 NOTE — CONSULTS
RD received consult for Diet education. Pt has hx of diet noncompliance after multiple diet education attempts. Pt also with AMS. Not appropriate for diet education at this time. Dietitians following with nutrition assessment and recommendations in RD progress notes. Will continue to monitor per nutrition standards of care.      Darrell Jones, MS, RD, LD on 10/13/2021 at 1:35 PM  Office: [phone]

## 2021-10-13 NOTE — PROGRESS NOTES
Mami Freire  Neurology Follow-up  [facility] Neurology    Date of Service: 10/13/2021    Subjective:   CC: Follow up today regarding: Altered mental status    Events noted. Chart and lab reviewed. Mentally clearer this AM.  OOB to chair. MRI pending. Had BRBPR overnight according to nursing. ROS : A 10-12 system review obtained and updated today and is unremarkable except as mentioned  in my interval history. family history includes Alcohol Abuse in his brother; Cancer in his sister and sister; Diabetes in his mother; Heart Disease in his father, sister, and sister; Kidney Disease in his sister; Obesity in his sister and sister.     Past Medical History:   Diagnosis Date    Ambulatory dysfunction     walker for long distances, SOB with distance    Aortic stenosis     echo 2017    Arthritis     hands and hips    Asthma     Bilateral hilar adenopathy syndrome 6/3/2013    CAD (coronary artery disease)     Dr. Alycia Lemus [facility]) 04/19/2019    EF= 43%    CHF (congestive heart failure) (HCC)     Chronic pain     COPD (chronic obstructive pulmonary disease) ([address]     pulmonology Dr. Jaylon Wise    Depression     Diabetes mellitus ([address]     borderline    Difficult intravenous access     Emphysema of lung ([address]     ESRD (end stage renal disease) on dialysis ([address]     MWF    Fear of needles     Gastric ulcer     GERD (gastroesophageal reflux disease)     Heart valve problem     bicuspic valve    Hemodialysis patient ([address]     History of spinal fracture     work incident    Hx of blood clots     Bilateral lower extremities; stents in place    Hyperlipidemia     Hypertension     MI (myocardial infarction) ([address] 2019    has had 9 MIs. 2019 was the last    Neuromuscular disorder ([address]     due to CVA    Numbness and tingling in left arm     from fistula    Pneumonia     PONV (postoperative nausea and vomiting)     Prolonged emergence from general anesthesia (PRAVACHOL) tablet 40 mg  40 mg Oral Nightly Charly Petersen MD   40 mg at 10/12/21 2152    tiotropium-olodaterol (STIOLTO) 2.5-2.5 MCG/ACT inhaler 2 puff  2 puff Inhalation Daily Charly Petersen MD   2 puff at 10/13/21 0834    QUEtiapine (SEROQUEL) tablet 25 mg  25 mg Oral Nightly Charly Petersen MD   25 mg at 10/12/21 2152    glucose (GLUTOSE) 40 % oral gel 15 g  15 g Oral PRN Charly Petersen MD   15 g at 10/13/21 0853    dextrose 50 % IV solution  12.5 g IntraVENous PRN Charly Petersen MD   12.5 g at 10/13/21 0914    glucagon (rDNA) injection 1 mg  1 mg IntraMUSCular PRN Charly Petersen MD        dextrose 5 % solution  100 mL/hr IntraVENous PRN Charly Petersen MD        sodium chloride flush 0.9 % injection 10 mL  10 mL IntraVENous 2 times per day Charly Petersen MD        sodium chloride flush 0.9 % injection 10 mL  10 mL IntraVENous PRN Charly Petersen MD        0.9 % sodium chloride infusion  25 mL IntraVENous PRN Charly Petersen MD        promethazine (PHENERGAN) tablet 12.5 mg  12.5 mg Oral Q6H PRN Charly Petersen MD        Or    [facility]) injection 4 mg  4 mg IntraVENous Q6H PRN Charly Petersen MD   4 mg at 10/11/21 2033    senna (SENOKOT) tablet 8.6 mg  1 tablet Oral Daily PRN Charly Petersen MD        bisacodyl (DULCOLAX) suppository 10 mg  10 mg Rectal Daily PRN Charly Petersen MD        aspirin EC tablet 81 mg  81 mg Oral Daily Charly Petersen MD   81 mg at 10/13/21 0920    Or    aspirin suppository 300 mg  300 mg Rectal Daily Charly Petersen MD        labetalol (NORMODYNE;TRANDATE) injection 10 mg  10 mg IntraVENous Q10 Min PRN Charly Petersen MD        insulin lispro (HUMALOG) injection vial 6 Units  0.05 Units/kg SubCUTAneous TID MARLENE Petersen MD        insulin lispro (HUMALOG) injection vial 0-12 Units  0-12 Units SubCUTAneous TID  Charly Petersen MD        insulin lispro (HUMALOG) injection vial 0-6 Units  0-6 Units SubCUTAneous Nightly [facility] Karlee Livingston MD   1 Units at 10/12/21 2041    sodium chloride flush 0.9 % injection 10 mL  10 mL IntraVENous 2 times per day Shital Sam MD   10 mL at 10/13/21 0925    sodium chloride flush 0.9 % injection 10 mL  10 mL IntraVENous PRKASANDRA Sam MD        0.9 % sodium chloride infusion  25 mL IntraVENous TRISHA Sam MD        LORazepam (ATIVAN) tablet 1 mg  1 mg Oral Q1H PRKASANDRA Sam MD        Or    LORazepam (ATIVAN) injection 1 mg  1 mg IntraVENous Q1H TRISHA Sam MD   1 mg at 10/11/21 2032    Or    LORazepam (ATIVAN) tablet 2 mg  2 mg Oral Q1H PRKASANDRA Sam MD        Or    LORazepam (ATIVAN) injection 2 mg  2 mg IntraVENous Q1H PRKASANDRA Sam MD   2 mg at 10/11/21 2239    thiamine (B-1) injection 100 mg  100 mg IntraVENous Daily Shital Sam MD   100 mg at 10/13/21 0920    therapeutic multivitamin-minerals 1 tablet  1 tablet Oral Daily Shital Sam MD   1 tablet at 10/13/21 0920    nicotine (NICODERM CQ) 14 MG/24HR 1 patch  1 patch TransDERmal Daily Shital Sam MD   1 patch at 10/13/21 0916    [Held by provider] heparin (porcine) injection 5,000 Units  5,000 Units SubCUTAneous 3 times per day Shital Sam MD   5,000 Units at 10/12/21 0551     Allergies   Allergen Reactions    Morphine Nausea And Vomiting      reports that he has been smoking cigarettes. He has a 16.50 pack-year smoking history. He has never used smokeless tobacco. He reports previous alcohol use. He reports that he does not use drugs. Objective:  Exam:   Constitutional:   Vitals:    10/13/21 0415 10/13/21 0751 10/13/21 0837 10/13/21 0919   BP: 126/65 104/62  119/72   Pulse: 78 63  70   Resp: 18 20     Temp: 97.4 °F (36.3 °C) 96.7 °F (35.9 °C)     TempSrc: Oral Oral     SpO2: 97% 96% 96%    Weight:       Height:         General appearance:  Normal development and appear in no acute distress. Mental Status:   Oriented to person, place, problem, and time.     Memory: Good immediate recall. Intact remote memory  Normal attention span and concentration. Language: intact naming, repeating and fluency   Good fund of Knowledge. Cranial Nerves:   II: Visual fields: Full. Pupils: equal, round, reactive to light  III,IV,VI: Extra Ocular Movements are intact. No nystagmus  V: Facial sensation is intact  VII: Facial strength and movements: intact and symmetric  IX: Palate elevation is symmetric  XI: Shoulder shrug is intact  XII: Tongue movements are normal  Musculoskeletal: 5/5 in all 4 extremities. Tone: Normal tone. Reflexes: Symmetric 2+ in both arms and legs. Coordination: no pronator drift, no dysmetria with FNF  Sensation: normal to all modalities in both arms and legs. Gait/Posture: steady gait        Data:  LABS:   Lab Results   Component Value Date     10/13/2021    K 3.9 10/13/2021    K 5.1 10/12/2021     10/13/2021    CO2 20 10/13/2021    BUN 67 10/13/2021    CREATININE 7.2 10/13/2021    GFRAA 10 10/13/2021    GFRAA >60 05/17/2013    LABGLOM 8 10/13/2021    GLUCOSE 52 10/13/2021    PHOS 7.2 10/13/2021    MG 2.20 10/13/2021    CALCIUM 8.8 10/13/2021     Lab Results   Component Value Date    WBC 12.1 10/13/2021    RBC 3.59 10/13/2021    HGB 10.6 10/13/2021    HCT 32.2 10/13/2021    MCV 89.7 10/13/2021    RDW 15.6 10/13/2021     10/13/2021     Lab Results   Component Value Date    INR 0.98 10/11/2021    PROTIME 11.1 10/11/2021       Neuroimaging was independently reviewed by me and discussed results with the patient  I reviewed blood testing and other test results and discussed results with the patient      Impression:    Subacute lacunar CVA, likely ischemic, anterior left thalamus. Wonder if patient is compliant with his medications at home? Recent right cerebellar CVA 9/2021  ESRD on HD  HTN  CAD  DM2 with neuropathy, uncontrolled.  A1c 9.9  HLD  Obesity  ZAINAB    Recommendation    MRI of brain pending.     No need to repeat carotid US and

## 2021-10-13 NOTE — PROGRESS NOTES
Speech Language Pathology    SLP attempted f/u, spoke with RN. Per RN, pt leaving for HD shortly. ST to continue to follow and re-attempt f/u at a later time.     Bell Gilliam M.S. [facility]  Speech-language pathologist  [phone]  Speech Desk Phone: [phone]

## 2021-10-13 NOTE — PROGRESS NOTES
Occupational Therapy  Facility/Department: [facility] C5 - MED SURG/ORTHO  Daily Treatment Note  NAME: Katia Cardoza  : 1970  MRN: [id number]    Date of Service: 10/13/2021    Discharge Recommendations:  Subacute/Skilled Nursing Facility     Assessment   Performance deficits / Impairments: Decreased functional mobility ; Decreased ADL status; Decreased strength;Decreased safe awareness;Decreased cognition;Decreased endurance;Decreased balance  Assessment: Pt tolerated OT session fair, requires frequent encouragement to participate as pt keeps asking to return to bed to sleep, reporting fatigue. Pt level of assist varies, at times requiring mod A of 2 for transfers and mobility with RW. Pt demos impaired safety and decreased insight into deficits. Pt would benefit from continued skilled OT in SNF setting at d/c. Prognosis: Fair  OT Education: OT Role;Plan of Care;ADL Adaptive Strategies;Transfer Training;Energy Conservation  Disease Specific Education: Pt educated on importance of OOB mobility, prevention of complications of bedrest, and general safety during hospitalization. Pt verbalized understanding, but would benefit from reinforcement due to cognition. Barriers to Learning: cognition  REQUIRES OT FOLLOW UP: Yes  Activity Tolerance  Activity Tolerance: Patient limited by fatigue;Treatment limited secondary to decreased cognition  Activity Tolerance: Vitals: BP= 105/68, HR= 69, SPO2= 96% RA  Safety Devices  Safety Devices in place: Yes  Type of devices: Left in chair;Chair alarm in place;Nurse notified;Call light within reach;Gait belt         Patient Diagnosis(es): The primary encounter diagnosis was Cerebrovascular accident (CVA) due to other mechanism ([facility] Diagnoses of ESRD (end stage renal disease) on dialysis [facility]) and Delirium were also pertinent to this visit.       has a past medical history of Ambulatory dysfunction, Aortic stenosis, Arthritis, Asthma, Bilateral hilar adenopathy syndrome, CAD assessed for rehabilitation services?: Yes  Response to previous treatment: Patient with no complaints from previous session  Family / Caregiver Present: No  Referring Practitioner: Smitha Bradshaw MD  Diagnosis: AMS    Subjective  Subjective: Pt resting in bed, agreeable to OT treatment with encouragement. Vital Signs  Patient Currently in Pain: Denies     Orientation  Orientation  Orientation Level: Oriented to person;Oriented to place;Oriented to situation;Disoriented to time     Objective    ADL  Grooming: Contact guard assistance (in stance at sink)  LE Dressing: Dependent/Total  Toileting: Maximum assistance     Balance  Sitting Balance: Supervision  Standing Balance: Dependent/Total (min-mod A of 2 with RW)  Standing Balance  Activity: functional/bathroom mobility, toileting, sink ADLs    Functional Mobility  Functional - Mobility Device: Rolling Walker  Activity: To/from bathroom  Assist Level: Dependent/Total (min-mod A of 2)    Bed mobility  Supine to Sit: Supervision (to L with HOB elevated)     Transfers  Sit to stand: Moderate assistance;2 Person assistance  Stand to sit: Moderate assistance;2 Person assistance     Cognition  Overall Cognitive Status: Exceptions  Following Commands: Follows one step commands with increased time; Follows one step commands with repetition  Attention Span: Attends with cues to redirect  Memory: Decreased short term memory  Safety Judgement: Decreased awareness of need for assistance;Decreased awareness of need for safety  Insights: Decreased awareness of deficits  Initiation: Requires cues for some  Sequencing: Requires cues for some     Plan   Plan  Times per week: 3-5x/wk    AM-PAC Score  AM-[facility] Inpatient Daily Activity Raw Score: 12 (10/13/21 1314)  AM-PAC Inpatient ADL T-Scale Score : 30.6 (10/13/21 1314)  ADL Inpatient CMS 0-100% Score: 66.57 (10/13/21 1314)  ADL Inpatient CMS G-Code Modifier : CL (10/13/21 1314)    Goals  Short term goals  Time Frame for Short term goals: 1 week by 10/19  Short term goal 1: Pt will complete LB ADLs with min A or less-- ongoing 10/13/21  Short term goal 2: Pt will complete toileting with CGA-- ongoing 10/13/21  Short term goal 3: Pt will complete functional transfers with SPV-- ongoing 10/13/21  Short term goal 4: Pt will tolerate standing at sink for 2-3 grooming tasks w/o fatigue-- ongoing 10/13/21  Short term goal 5: Pt will tolerate B UE ex x 20 reps w/o fatigue-- ongoing 10/13/21  Patient Goals   Patient goals :  \"Go home\"       Therapy Time   Individual Concurrent Group Co-treatment   Time In 0811         Time Out 0836         Minutes JOSSIE Sandra/АННА

## 2021-10-13 NOTE — PROGRESS NOTES
Paged MD Vanda Grace. [address] [facility] or Facility: A From: Luis Enrique Boyle RE: Ellie Blizzard 1970 RM: [phone]     Can he get a one time of Ativan for MRI and dialysis? Need Callback: NO CALLBACK REQ C5 MED SURG / ORTHO    Response: Ativan ordered q4hrs as needed. Paged again. Patient NPO for possible GI bleed. Waiting for GI consult. Patient BG was in the 50's this am, do you want his lantus held? Response: Hold lantus.

## 2021-10-13 NOTE — PROGRESS NOTES
10/12/21 2222   NIV Type   NIV Started/Stopped On   Equipment Type v60   Mode Bilevel   Mask Type Full face mask   Mask Size Large   Settings/Measurements   IPAP 14 cmH20   CPAP/EPAP 6 cmH2O   Rate Ordered 16   Resp 17   FiO2  30 %   Vt Exhaled 501 mL   Mask Leak (lpm) 21 lpm   Comfort Level Good   Using Accessory Muscles No   SpO2 99   Alarm Settings   Alarms On Y   Press Low Alarm 6 cmH2O   High Pressure Alarm 30 cmH2O   Resp Rate Low Alarm 6   High Respiratory Rate 40 br/min

## 2021-10-13 NOTE — PROGRESS NOTES
Physical Therapy  Facility/Department: [facility] C5 - MED SURG/ORTHO  Daily Treatment Note  NAME: Deborah Schwarz  : 1970  MRN: [id number]    Date of Service: 10/13/2021    Discharge Recommendations:  Subacute/Skilled Nursing Facility   PT Equipment Recommendations  Equipment Needed: No  Other: TBD at SNF    Assessment   Body structures, Functions, Activity limitations: Decreased strength;Decreased balance;Decreased functional mobility ; Increased pain;Decreased endurance;Decreased posture  Assessment: Pt seen this date with cotx with OT to progress functional mobility safely. Pt performs transfers and ambulation with RW this date with improved steadiness but continues to require increased level of assist due to impaired safety awareness and balance. Pt would benefit from continued skilled PT to address deficits listed above. Recommend SNF upon d/c due to current level of assist and increased risk for falls  Treatment Diagnosis: Impaired balance, decreased (I) with functional mobility  Prognosis: Good  Decision Making: Medium Complexity  PT Education: Goals; General Safety;Gait Training;PT Role;Plan of Care;Disease Specific Education; Functional Mobility Training;Precautions;Transfer Training  Patient Education: Disease-specific education: Pt educated on role of PT in hospital and benefits of regular OOB activity to avoid complications of bedrest; pt verbalizes understanding. REQUIRES PT FOLLOW UP: Yes  Activity Tolerance  Activity Tolerance: VSS during activity     Patient Diagnosis(es): The primary encounter diagnosis was Cerebrovascular accident (CVA) due to other mechanism ([facility] Diagnoses of ESRD (end stage renal disease) on dialysis [facility]) and Delirium were also pertinent to this visit.      has a past medical history of Ambulatory dysfunction, Aortic stenosis, Arthritis, Asthma, Bilateral hilar adenopathy syndrome, CAD (coronary artery disease), Cardiomyopathy ([address] CHF (congestive heart failure) ([facility] [address] Chronic pain, COPD (chronic obstructive pulmonary disease) ([facility]), Depression, Diabetes mellitus ([address] Difficult intravenous access, Emphysema of lung ([address] ESRD (end stage renal disease) on dialysis ([address] Fear of needles, Gastric ulcer, GERD (gastroesophageal reflux disease), Heart valve problem, Hemodialysis patient ([address] History of spinal fracture, Hx of blood clots, Hyperlipidemia, Hypertension, MI (myocardial infarction) ([address] Neuromuscular disorder ([address] Numbness and tingling in left arm, Pneumonia, PONV (postoperative nausea and vomiting), Prolonged emergence from general anesthesia, Sleep apnea, Stroke ([address] TIA (transient ischemic attack), and Unspecified diseases of blood and blood-forming organs. has a past surgical history that includes Tonsillectomy; cyst removal (08/14/2013); Colonoscopy; Coronary angioplasty with stent (05/26/15); vascular surgery (aprx 2 years ago); Colonoscopy (2/29/2015); Upper gastrointestinal endoscopy (01/06/2016); Upper gastrointestinal endoscopy (01/29/2017); other surgical history (02/01/2017); Dialysis fistula creation (Left, 10/30/2017); Diagnostic Cardiac Cath Lab Procedure; other surgical history (2018); other surgical history (Bilateral, 06/26/2018); pr lap insertion tunneled intraperitoneal catheter (N/A, 9/21/2018); other surgical history (Right, 09/2018); Dialysis fistula creation (Left, 3/27/2019); other surgical history (05/28/2019); Endoscopy, colon, diagnostic; Catheter Removal (Right, 7/2/2019); and Aortic valve replacement (N/A, 10/15/2019). Restrictions  Restrictions/Precautions  Restrictions/Precautions: General Precautions, Fall Risk, Seizure  Position Activity Restriction  Other position/activity restrictions: High fall risk per nursing assessment, no BP/needle sticks in LUE, telemetry, up with assistance     Subjective   General  Chart Reviewed:  Yes  Additional Pertinent Hx: Chronic LBP, HD MWF, pt reports previous heart valve surgery  Family / Caregiver Present: No  Referring Practitioner: Dr. Renée Quiñonez  Subjective  Subjective: Pt supine in bed upon arrival, agreeable to PT tx with encouragement  Pain Screening  Patient Currently in Pain: Denies  Vital Signs  Patient Currently in Pain: Denies     Objective   Bed mobility  Supine to Sit: Supervision  Scooting: Supervision     Transfers  Sit to Stand: 2 Person Assistance (mod(A)x2 from EOB, min(A)x2 from toilet with use of grab bar)  Stand to sit: Moderate Assistance  Bed to Chair: 2 Person Assistance (with RW; min-mod(A)x2)     Ambulation  Ambulation?: Yes  Ambulation 1  Surface: level tile  Device: Rolling Walker  Assistance: 2 Person assistance (min-mod(A)x2)  Quality of Gait: Pt amb with very slow gold with wide ALANNAH and mild unsteadiness. No LOB this date. Distance: 10 ft + 5 ft + 30 ft  Comments: Pt ambulates to bathroom >sink >recliner. Pt declines further ambulation due to fatigue     Balance  Posture: Fair  Sitting - Static: Good  Sitting - Dynamic: Good  Standing - Static: Fair;-  Standing - Dynamic: Poor;+  Comments: pt demo improved steadiness with standing with RW this date. AM-PAC Score  AM-PAC Inpatient Mobility Raw Score : 15 (10/13/21 1030)  AM-PAC Inpatient T-Scale Score : 39.45 (10/13/21 1030)  Mobility Inpatient CMS 0-100% Score: 57.7 (10/13/21 1030)  Mobility Inpatient CMS G-Code Modifier : CK (10/13/21 1030)        Goals  Short term goals  Time Frame for Short term goals: 1 week, 10/19/21 (unless otherwise specified)  Short term goal 1: Pt will transfer supine <-> sit with modified(I)--10/13 supervision  Short term goal 2: Pt will transfer sit <-> stand and bed>chair with CGA x 1--10/13 min-mod(A)x2  Short term goal 3: Pt will ambulate x 75 feet using least AD with min/CGA x 1--10/13 30 ft RW and min-mod(A)x2  Short term goal 4: By 10/14/21: Pt will tolerate 12-15 reps BLE exercise for strengthening, balance, and endurance--10/13 NT  Patient Goals   Patient goals :  \"To go home\"    Plan    Plan  Times per week: 3-5x/week in acute care  Times per day: Daily  Specific instructions for Next Treatment: Progress ther ex and mobility as tolerated  Current Treatment Recommendations: Strengthening, Balance Training, Endurance Training, ROM, Functional Mobility Training, Transfer Training, Gait Training, Safety Education & Training, Patient/Caregiver Education & Training, Stair training, Home Exercise Program, Equipment Evaluation, Education, & procurement  Safety Devices  Type of devices: All fall risk precautions in place, Left in chair, Call light within reach, Chair alarm in place, Nurse notified, Gait belt, Patient at risk for falls     Therapy Time   Individual Concurrent Group Co-treatment   Time In 0811         Time Out 0836         Minutes 25         Timed Code Treatment Minutes: 25 Minutes     If pt is unable to be seen after this session, please let this note serve as discharge summary. Please see case management note for discharge disposition. Thank you.     Linda Hurley, PT

## 2021-10-13 NOTE — PROGRESS NOTES
Hospitalist Progress Note      PCP: Lanette Perez MD    Date of Admission: 10/11/2021    Chief Complaint: Altered mental status    Subjective: No new c/o.        Medications:  Reviewed    Infusion Medications    dextrose      sodium chloride      sodium chloride       Scheduled Medications    torsemide  100 mg Oral Daily    carvedilol  12.5 mg Oral BID    linaclotide  145 mcg Oral QAM AC    traZODone  150 mg Oral Nightly    insulin glargine  30 Units SubCUTAneous BID    Calcium Acetate (Phos Binder)  667 mg Oral TID WC    DULoxetine  60 mg Oral Daily    gabapentin  100 mg Oral TID    isosorbide mononitrate  30 mg Oral Daily    losartan  25 mg Oral Daily    pantoprazole  40 mg Oral QAM AC    pravastatin  40 mg Oral Nightly    tiotropium-olodaterol  2 puff Inhalation Daily    QUEtiapine  25 mg Oral Nightly    sodium chloride flush  10 mL IntraVENous 2 times per day    aspirin  81 mg Oral Daily    Or    aspirin  300 mg Rectal Daily    insulin lispro  0.05 Units/kg SubCUTAneous TID WC    insulin lispro  0-12 Units SubCUTAneous TID WC    insulin lispro  0-6 Units SubCUTAneous Nightly    sodium chloride flush  10 mL IntraVENous 2 times per day    thiamine  100 mg IntraVENous Daily    multivitamin  1 tablet Oral Daily    nicotine  1 patch TransDERmal Daily    [Held by provider] heparin (porcine)  5,000 Units SubCUTAneous 3 times per day     PRN Meds: oxyCODONE-acetaminophen, [Held by provider] cyclobenzaprine, glucose, dextrose, glucagon (rDNA), dextrose, sodium chloride flush, sodium chloride, promethazine **OR** ondansetron, senna, bisacodyl, labetalol, sodium chloride flush, sodium chloride, LORazepam **OR** LORazepam **OR** LORazepam **OR** LORazepam **OR** LORazepam **OR** LORazepam **OR** LORazepam **OR** LORazepam      Intake/Output Summary (Last 24 hours) at 10/13/2021 0901  Last data filed at 10/12/2021 1941  Gross per 24 hour   Intake 1360 ml   Output 1642 ml   Net -282 ml Physical Exam Performed:    /62   Pulse 63   Temp 96.7 °F (35.9 °C) (Oral)   Resp 20   Ht 5' 8\" (1.727 m)   Wt 268 lb 15.4 oz (122 kg)   SpO2 96%   BMI 40.90 kg/m²     General appearance: No apparent distress, appears stated age and cooperative. HEENT: Pupils equal, round, and reactive to light. Conjunctivae/corneas clear. Neck: Supple, with full range of motion. No jugular venous distention. Trachea midline. Respiratory:  Normal respiratory effort. Clear to auscultation, bilaterally without Rales/Wheezes/Rhonchi. Cardiovascular: Regular rate and rhythm with normal S1/S2 without murmurs, rubs or gallops. Abdomen: Soft, non-tender, non-distended with normal bowel sounds. Musculoskeletal: No clubbing, cyanosis or edema bilaterally. Full range of motion without deformity. Skin: Skin color, texture, turgor normal.  No rashes or lesions. Neurologic:  Neurovascularly intact without any focal sensory/motor deficits. Cranial nerves: II-XII intact, grossly non-focal.  Psychiatric: Alert and oriented, thought content appropriate, normal insight  Capillary Refill: Brisk,< 3 seconds   Peripheral Pulses: +2 palpable, equal bilaterally       Labs:   Recent Labs     10/11/21  1520 10/11/21  1520 10/12/21  0607 10/12/21  2120 10/13/21  0656   WBC 11.4*  --  18.5*  --  12.1*   HGB 11.2*   < > 11.3* 11.1* 10.6*   HCT 34.4*   < > 35.1* 33.9* 32.2*     --  247  --  220    < > = values in this interval not displayed.      Recent Labs     10/11/21  1520 10/12/21  0608 10/13/21  0656    143 140   K 4.7 5.1 3.9    105 103   CO2 22 22 20*   BUN 76* 79* 67*   CREATININE 8.3* 8.1* 7.2*   CALCIUM 9.2 9.4 8.8   PHOS  --   --  7.2*     Recent Labs     10/11/21  1520 10/12/21  0608   AST 13* 9*   ALT 10 9*   BILITOT <0.2 <0.2   ALKPHOS 100 100     Recent Labs     10/11/21  2040   INR 0.98     Recent Labs     10/12/21  2120 10/13/21  0313 10/13/21  0656   TROPONINI 0.17* 0.17* 0.18*       Urinalysis: Lab Results   Component Value Date    NITRU Negative 09/07/2021    WBCUA 10-20 09/07/2021    BACTERIA 1+ 09/07/2021    RBCUA 3-4 09/07/2021    BLOODU TRACE-LYSED 09/07/2021    SPECGRAV 1.015 09/07/2021    GLUCOSEU 100 09/07/2021       Consults:    IP CONSULT TO HOSPITALIST  IP CONSULT TO NEPHROLOGY  IP CONSULT TO NEUROLOGY  IP CONSULT TO HEART FAILURE NURSE/COORDINATOR  IP CONSULT TO DIETITIAN  IP CONSULT TO GI      Assessment/Plan:    Active Hospital Problems    Diagnosis     CAD S/P percutaneous coronary angioplasty [I25.10, Z98.61]      Priority: High    Chronic obstructive pulmonary disease ([facility] [J44.9]      Priority: Medium    Acute CVA (cerebrovascular accident) ([facility] [address] [I63.9]     Alcohol abuse, daily use [F10.10]     Acute cerebrovascular accident (CVA) ([facility] [address] [I63.9]     Acute encephalopathy [G93.40]     Acute combined systolic and diastolic CHF, NYHA class 4 ([facility] [address] [I50.41]     ESRD (end stage renal disease) on dialysis ([facility] [address] [N18.6, Z99.2]     ZAINAB (obstructive sleep apnea) [G47.33]     DM (diabetes mellitus), secondary, uncontrolled, w/neurologic complic (HCC) [A02.62, N36.10]        CVA - subacute, thromboembolic likely. MRI ordered and pending. Neurology consulted and appreciated. Continue ASA/Statin for 2nd prevention and risk reduction.      HTN/CAD - w/ known CAD but no evidence of active signs/sxs of ischemia/failure. Currently controlled on home meds w/ vitals reviewed and documented as above. Cardiology consulted and appreciated. HyperLipidemia - controlled on home Statin. Continue, w/ f/u and med adjustment w/ PCP    CHF - acute on chronic combined sytolic/diastolic failure w/ reduced EF 40-45% by Echo Sept 2021 w/ Grade 2 diastolic dysfunction. Likely due to hypertensive heart disease. Continue current medical management and follow I/O as well as clinical response. Pinky Angles ESRD - on HD. Patient noncompliant and arrived in volume overload.   Nephrology consulted and appreciated s/p inpatient HD - partially completed 12 October    Encephalopathy - acute metabolic, 2nd to above. Will continue to follow clinical response w/ supportive care PRN. COPD - w/out chronic respiratory failure on no baseline home O2. Controlled on home medication regimen - continued. DM2 - controlled on home oral antiGlycemics/Insulin - held. Started on weight based Lantus and follow FSBS/SSI medium regimen. Last HbA1c 9.3% dated this admission. Anticipate resuming/continuing home regimen at discharge. Morbid Obesity -  With Body mass index is 40.9 kg/m². Complicating assessment and treatment. Placing patient at risk for multiple co-morbidities as well as early death and contributing to the patient's presentation. Counseled on weight loss. ZAINAB - likely 2nd to obesity. Controlled on home CPAP/BiPap - continued, w/ outpatient f/u as previously arranged.      Alcohol Abuse - presumed active and ongoing w/ dependence but no signs/sxs of w/drawal - patient denies. Cessation counseled.         DVT Prophylaxis: SQ Heparin/IPC     Recent Labs     10/11/21  1520 10/12/21  0607 10/13/21  0656    247 220     Diet: Diet NPO Exceptions are: Sips of Water with Meds  Code Status: Full Code      PT/OT Eval Status: ordered and pending    Dispo - Possibly Friday 15 October pending clinical course and subspecialty recs.      Evangelist Garber MD

## 2021-10-13 NOTE — PROGRESS NOTES
[facility] Progress Note           Subjective/   46y.o. year old male who we are seeing in consultation for ESRD. HPI:  Last HD on 10/12 with 1.2 liters removed, post-weight of 122 kg. Ran for 98 minutes on 10/12.  +weak. ROS:  Denies any shortness of breath. Intake adequate. Objective/   GEN:  Chronically ill, /72   Pulse 70   Temp 96.7 °F (35.9 °C) (Oral)   Resp 20   Ht 5' 8\" (1.727 m)   Wt 268 lb 15.4 oz (122 kg)   SpO2 96%   BMI 40.90 kg/m²   HEENT: non-icteric, no JVD  CV: S1, S2 without m/r/g; ++ LE edema  RESP: CTA B without w/r/r; breathing wnl  ABD: +bs, soft, nt, no hsm  SKIN: warm, no rashes  ACCESS: Left [facility]    Data/  Recent Labs     10/11/21  1520 10/11/21  1520 10/12/21  0607 10/12/21  2120 10/13/21  0656   WBC 11.4*  --  18.5*  --  12.1*   HGB 11.2*   < > 11.3* 11.1* 10.6*   HCT 34.4*   < > 35.1* 33.9* 32.2*   MCV 90.2  --  90.8  --  89.7     --  247  --  220    < > = values in this interval not displayed. Recent Labs     10/11/21  1520 10/11/21  2040 10/12/21  0608 10/13/21  0656     --  143 140   K 4.7  --  5.1 3.9     --  105 103   CO2 22  --  22 20*   GLUCOSE 167*  --  78 52*   PHOS  --   --   --  7.2*   MG  --  2.30  --  2.20   BUN 76*  --  79* 67*   CREATININE 8.3*  --  8.1* 7.2*   LABGLOM 7*  --  7* 8*   GFRAA 8*  --  8* 10*       Assessment/     -ESRD on HD MWF     -Recent CVA/Acute metabolic encephalopathy - plans per Neurology     -Hypertension - high on admission, now within range     -Anemia of chronic disease - Hb 10.6     -Leukocytosis       Plan/     - HD today per schedule -  kg  - Holding on any DAVON dosing   - Trend labs, bp's, blood cultures     ____________________________________  Uvaldo Martinez MD  [facility]  [email or web address]  Office: [phone]

## 2021-10-13 NOTE — PROGRESS NOTES
10/13/21 0032   NIV Type   $NIV $Daily Charge   Equipment Type v60   Mode Bilevel   Mask Type Full face mask   Mask Size Large   Settings/Measurements   IPAP 14 cmH20   CPAP/EPAP 6 cmH2O   Rate Ordered 16   Resp 18   FiO2  30 %   Vt Exhaled 354 mL   Minute Volume 7 Liters   Comfort Level Good   Using Accessory Muscles No   SpO2 96   Alarm Settings   Alarms On Y   Press Low Alarm 6 cmH2O   High Pressure Alarm 30 cmH2O   Apnea (secs) 20 secs   Resp Rate Low Alarm 6   High Respiratory Rate 40 br/min

## 2021-10-14 LAB
ANION GAP SERPL CALCULATED.3IONS-SCNC: 15 MMOL/L (ref 3–16)
BUN BLDV-MCNC: 37 MG/DL (ref 7–20)
CALCIUM SERPL-MCNC: 8.8 MG/DL (ref 8.3–10.6)
CHLORIDE BLD-SCNC: 101 MMOL/L (ref 99–110)
CO2: 22 MMOL/L (ref 21–32)
CREAT SERPL-MCNC: 5.7 MG/DL (ref 0.9–1.3)
GFR AFRICAN AMERICAN: 13
GFR NON-AFRICAN AMERICAN: 10
GLUCOSE BLD-MCNC: 121 MG/DL (ref 70–99)
GLUCOSE BLD-MCNC: 151 MG/DL (ref 70–99)
GLUCOSE BLD-MCNC: 175 MG/DL (ref 70–99)
GLUCOSE BLD-MCNC: 186 MG/DL (ref 70–99)
GLUCOSE BLD-MCNC: 45 MG/DL (ref 70–99)
GLUCOSE BLD-MCNC: 47 MG/DL (ref 70–99)
GLUCOSE BLD-MCNC: 62 MG/DL (ref 70–99)
GLUCOSE BLD-MCNC: 71 MG/DL (ref 70–99)
GLUCOSE BLD-MCNC: 72 MG/DL (ref 70–99)
GLUCOSE BLD-MCNC: 73 MG/DL (ref 70–99)
HCT VFR BLD CALC: 33.6 % (ref 40.5–52.5)
HEMOGLOBIN: 11.1 G/DL (ref 13.5–17.5)
MAGNESIUM: 2 MG/DL (ref 1.8–2.4)
MCH RBC QN AUTO: 29.7 PG (ref 26–34)
MCHC RBC AUTO-ENTMCNC: 33 G/DL (ref 31–36)
MCV RBC AUTO: 90.2 FL (ref 80–100)
PDW BLD-RTO: 15.5 % (ref 12.4–15.4)
PERFORMED ON: ABNORMAL
PERFORMED ON: NORMAL
PERFORMED ON: NORMAL
PLATELET # BLD: 194 K/UL (ref 135–450)
PMV BLD AUTO: 8.2 FL (ref 5–10.5)
POTASSIUM SERPL-SCNC: 4 MMOL/L (ref 3.5–5.1)
RBC # BLD: 3.73 M/UL (ref 4.2–5.9)
SODIUM BLD-SCNC: 138 MMOL/L (ref 136–145)
TROPONIN: 0.17 NG/ML
TROPONIN: 0.18 NG/ML
TROPONIN: 0.19 NG/ML
WBC # BLD: 8.6 K/UL (ref 4–11)

## 2021-10-14 PROCEDURE — 97530 THERAPEUTIC ACTIVITIES: CPT

## 2021-10-14 PROCEDURE — 36415 COLL VENOUS BLD VENIPUNCTURE: CPT

## 2021-10-14 PROCEDURE — 6360000002 HC RX W HCPCS: Performed by: HOSPITALIST

## 2021-10-14 PROCEDURE — 2580000003 HC RX 258: Performed by: HOSPITALIST

## 2021-10-14 PROCEDURE — 80048 BASIC METABOLIC PNL TOTAL CA: CPT

## 2021-10-14 PROCEDURE — 1200000000 HC SEMI PRIVATE

## 2021-10-14 PROCEDURE — 99233 SBSQ HOSP IP/OBS HIGH 50: CPT | Performed by: NURSE PRACTITIONER

## 2021-10-14 PROCEDURE — 2700000000 HC OXYGEN THERAPY PER DAY

## 2021-10-14 PROCEDURE — 2580000003 HC RX 258: Performed by: INTERNAL MEDICINE

## 2021-10-14 PROCEDURE — 94660 CPAP INITIATION&MGMT: CPT

## 2021-10-14 PROCEDURE — 85027 COMPLETE CBC AUTOMATED: CPT

## 2021-10-14 PROCEDURE — 83735 ASSAY OF MAGNESIUM: CPT

## 2021-10-14 PROCEDURE — 94640 AIRWAY INHALATION TREATMENT: CPT

## 2021-10-14 PROCEDURE — 97110 THERAPEUTIC EXERCISES: CPT

## 2021-10-14 PROCEDURE — 97129 THER IVNTJ 1ST 15 MIN: CPT

## 2021-10-14 PROCEDURE — 97116 GAIT TRAINING THERAPY: CPT

## 2021-10-14 PROCEDURE — 6370000000 HC RX 637 (ALT 250 FOR IP): Performed by: HOSPITALIST

## 2021-10-14 PROCEDURE — 84484 ASSAY OF TROPONIN QUANT: CPT

## 2021-10-14 PROCEDURE — 97130 THER IVNTJ EA ADDL 15 MIN: CPT

## 2021-10-14 PROCEDURE — 6370000000 HC RX 637 (ALT 250 FOR IP): Performed by: NURSE PRACTITIONER

## 2021-10-14 PROCEDURE — 2500000003 HC RX 250 WO HCPCS: Performed by: HOSPITALIST

## 2021-10-14 PROCEDURE — 94761 N-INVAS EAR/PLS OXIMETRY MLT: CPT

## 2021-10-14 RX ORDER — LOSARTAN POTASSIUM 25 MG/1
25 TABLET ORAL DAILY
Status: CANCELLED | OUTPATIENT
Start: 2021-10-15

## 2021-10-14 RX ORDER — INSULIN GLARGINE 100 [IU]/ML
30 INJECTION, SOLUTION SUBCUTANEOUS NIGHTLY
Status: DISCONTINUED | OUTPATIENT
Start: 2021-10-14 | End: 2021-10-15 | Stop reason: HOSPADM

## 2021-10-14 RX ORDER — 0.9 % SODIUM CHLORIDE 0.9 %
250 INTRAVENOUS SOLUTION INTRAVENOUS ONCE
Status: COMPLETED | OUTPATIENT
Start: 2021-10-14 | End: 2021-10-14

## 2021-10-14 RX ORDER — CLOPIDOGREL BISULFATE 75 MG/1
75 TABLET ORAL DAILY
Status: DISCONTINUED | OUTPATIENT
Start: 2021-10-15 | End: 2021-10-15 | Stop reason: HOSPADM

## 2021-10-14 RX ADMIN — INSULIN LISPRO 6 UNITS: 100 INJECTION, SOLUTION INTRAVENOUS; SUBCUTANEOUS at 13:03

## 2021-10-14 RX ADMIN — THIAMINE HYDROCHLORIDE 100 MG: 100 INJECTION, SOLUTION INTRAMUSCULAR; INTRAVENOUS at 08:44

## 2021-10-14 RX ADMIN — INSULIN LISPRO 2 UNITS: 100 INJECTION, SOLUTION INTRAVENOUS; SUBCUTANEOUS at 13:02

## 2021-10-14 RX ADMIN — DEXTROSE MONOHYDRATE 12.5 G: 25 INJECTION, SOLUTION INTRAVENOUS at 05:54

## 2021-10-14 RX ADMIN — CALCIUM ACETATE 667 MG: 667 CAPSULE ORAL at 17:44

## 2021-10-14 RX ADMIN — OXYCODONE AND ACETAMINOPHEN 1 TABLET: 5; 325 TABLET ORAL at 19:47

## 2021-10-14 RX ADMIN — CARVEDILOL 12.5 MG: 3.12 TABLET, FILM COATED ORAL at 23:59

## 2021-10-14 RX ADMIN — LOSARTAN POTASSIUM 25 MG: 25 TABLET, FILM COATED ORAL at 08:45

## 2021-10-14 RX ADMIN — CARVEDILOL 12.5 MG: 3.12 TABLET, FILM COATED ORAL at 08:45

## 2021-10-14 RX ADMIN — SODIUM CHLORIDE 250 ML: 9 INJECTION, SOLUTION INTRAVENOUS at 12:11

## 2021-10-14 RX ADMIN — Medication 1 TABLET: at 08:45

## 2021-10-14 RX ADMIN — GABAPENTIN 100 MG: 100 CAPSULE ORAL at 08:45

## 2021-10-14 RX ADMIN — INSULIN LISPRO 6 UNITS: 100 INJECTION, SOLUTION INTRAVENOUS; SUBCUTANEOUS at 17:44

## 2021-10-14 RX ADMIN — ISOSORBIDE MONONITRATE 30 MG: 30 TABLET, EXTENDED RELEASE ORAL at 08:45

## 2021-10-14 RX ADMIN — TORSEMIDE 100 MG: 100 TABLET ORAL at 08:45

## 2021-10-14 RX ADMIN — GABAPENTIN 100 MG: 100 CAPSULE ORAL at 12:35

## 2021-10-14 RX ADMIN — OXYCODONE AND ACETAMINOPHEN 1 TABLET: 5; 325 TABLET ORAL at 09:45

## 2021-10-14 RX ADMIN — PANTOPRAZOLE SODIUM 40 MG: 40 TABLET, DELAYED RELEASE ORAL at 08:48

## 2021-10-14 RX ADMIN — CALCIUM ACETATE 667 MG: 667 CAPSULE ORAL at 08:45

## 2021-10-14 RX ADMIN — ASPIRIN 81 MG: 81 TABLET, COATED ORAL at 08:45

## 2021-10-14 RX ADMIN — DULOXETINE HYDROCHLORIDE 60 MG: 60 CAPSULE, DELAYED RELEASE ORAL at 08:45

## 2021-10-14 RX ADMIN — TIOTROPIUM BROMIDE AND OLODATEROL 2 PUFF: 3.124; 2.736 SPRAY, METERED RESPIRATORY (INHALATION) at 08:15

## 2021-10-14 RX ADMIN — SODIUM CHLORIDE, PRESERVATIVE FREE 10 ML: 5 INJECTION INTRAVENOUS at 08:46

## 2021-10-14 RX ADMIN — CALCIUM ACETATE 667 MG: 667 CAPSULE ORAL at 12:35

## 2021-10-14 RX ADMIN — DEXTROSE MONOHYDRATE 100 ML/HR: 50 INJECTION, SOLUTION INTRAVENOUS at 05:15

## 2021-10-14 ASSESSMENT — PAIN SCALES - GENERAL
PAINLEVEL_OUTOF10: 7
PAINLEVEL_OUTOF10: 8
PAINLEVEL_OUTOF10: 9
PAINLEVEL_OUTOF10: 8

## 2021-10-14 NOTE — CODE DOCUMENTATION
Pt alert and oriented, reporting dizziness, trendelenberg. Plan to hold blood pressure medications for the rest of the day. No bolus due to being a dialysis patient.

## 2021-10-14 NOTE — CONSULTS
Gastroenterology Consult Note    Patient:   Candiec Nj   YOB: 1970   Facility:   [facility]   Referring/PCP: Harvinder Griffith MD  Date:     10/13/2021  Consultant:   Fede Pritchett MD, MD    Subjective: This 46 y.o. male was admitted 10/11/2021 with a diagnosis of \"Delirium [R41.0]  ESRD (end stage renal disease) on dialysis ([facility] [N18.6, Z99.2]  Acute CVA (cerebrovascular accident) ([facility] [address] [I63.9]  Cerebrovascular accident (CVA) due to other mechanism [facility]) [I63.89]\" and is seen in consultation regarding \"hematochezia\". Information was obtained from interview of  the patient, examination of the patient, and review of records. I did  update the past medical, surgical, social and / or family history. Hematochezia for 1 day mild in rectum associated w ?hemorrhoids    Current status  Present  Diet Order: Diet NPO Exceptions are: Sips of Water with Meds and he is tolerating diet. Recently, he has experienced no abdominal  Pain and he has not required Intravenous narcotic analgesics. The patient has also experienced no constipation, diarrhea, fever, melena, nausea and vomiting      Prior to Admission medications    Medication Sig Start Date End Date Taking?  Authorizing Provider   Continuous Blood Gluc Sensor (DEXCOM G6 SENSOR) MISC Every 10 days 10/5/21   Harvinder Griffith MD   Continuous Blood Gluc Transmit (DEXCOM G6 TRANSMITTER) MISC 1 each by Does not apply route every 3 months 10/5/21   Harvinder Griffith MD   Continuous Blood Gluc  ([address]) [address] 1 each by Does not apply route Daily with lunch 10/5/21   Harvinder Griffith MD   hydrALAZINE (APRESOLINE) 50 MG tablet TAKE 1/2 TABLET BY MOUTH EVERY 8 HOURS Take extra 1/2 for blood pressure greater than 160/100 10/5/21   Harvinder Griffith MD   DULoxetine (CYMBALTA) 60 MG extended release capsule TAKE 1 CAPSULE BY MOUTH EVERY DAY 10/5/21   Harvinder Griffith MD   carvedilol (COREG) 12.5 MG tablet TAKE 1 TABLET BY MOUTH TWICE DAILY WITH MEALS 9/29/21   Dayna Loja MD   traZODone (DESYREL) 150 MG tablet TAKE ONE (1) TABLET BY MOUTH NIGHTLY 9/29/21   Dayna Loja MD   dilTIAZem (CARDIZEM CD) 180 MG extended release capsule TAKE 1 CAPSULE BY MOUTH DAILY 9/29/21   Dayna Loja MD   pravastatin (PRAVACHOL) 40 MG tablet TAKE 1 TABLET BY MOUTH EACH EVENING 9/29/21   Dayna Loja MD   B Complex-C-Folic Acid (VIRT-CAPS) 1 MG CAPS TAKE 1 CAPSULE BY MOUTH EVERY DAY 9/20/21   Dayna Loja MD   oxyCODONE-acetaminophen (PERCOCET) 7.5-325 MG per tablet Take 1 tablet by mouth every 4-6 hours as needed for Pain for up to 30 days.  9/13/21 10/13/21  Dayna Loja MD   losartan (COZAAR) 25 MG tablet Take 1 tablet by mouth daily 9/13/21   JAY Zavala - CNP   torsemide [facility]) 100 MG tablet Take 1 tablet by mouth daily 9/4/21   Drea Bar MD   isosorbide mononitrate (IMDUR) 30 MG extended release tablet TAKE 1 TABLET BY MOUTH EVERY DAY 9/3/21   JAY Huang - CNP   pantoprazole (PROTONIX) 40 MG tablet TAKE (1) TABLET BY MOUTH EACH MORNING BEFORE BREAKFAST 9/3/21   JAY Rodriguez - CNP   Calcium Acetate, Phos Binder, 667 MG CAPS TAKE 1 CAPSULE BY MOUTH THREE TIMES DAILY WITH MEALS 8/12/21   Dayna Loja MD   QUEtiapine (SEROQUEL) 50 MG tablet TAKE (1) TABLET BY MOUTH IN THE EVENING 8/5/21   Dayna Loja MD   BASAGLAR KWIKPEN 100 UNIT/ML injection pen ADMINISTER 60 UNITS UNDER THE SKIN EVERY NIGHT 7/29/21   Dayna Loja MD   albuterol (PROVENTIL) (2.5 MG/3ML) 0.083% nebulizer solution INHALE 1 VIAL VIA NEBULIZER EVERY 6 HOURS AS NEEDED FOR WHEEZING 6/2/21   Dayna Loja MD   famotidine (PEPCID) 20 MG tablet TAKE 1 TABLET BY MOUTH TWICE DAILY AS NEEDED FOR HEARTBURN 5/28/21   Dayna Loja MD   hydrOXYzine (VISTARIL) 50 MG capsule TAKE 1 TO 2 CAPSULES BY MOUTH NIGHTLY 5/26/21   Dayna Loja MD   gabapentin (NEURONTIN) 100 MG capsule TAKE 1 TO 2 CAPSULES BY MOUTH THREE TIMES A DAY 5/25/21 10/5/21  Len Escobedo MD   LINZESS 145 MCG capsule TAKE 1 CAPSULE BY MOUTH EVERY MORNING BEFORE BREAKFAST 5/25/21   Len Escobedo MD   cyclobenzaprine (FLEXERIL) 10 MG tablet TAKE 1 TABLET BY MOUTH EVERY 8 HOURS AS NEEDED 5/3/21   Len Esocbedo MD   tiZANidine (ZANAFLEX) 4 MG tablet TAKE 1 TABLET BY MOUTH THREE TIMES DAILY  Patient not taking: Reported on 10/5/2021 4/27/21   Len Escobedo MD   simethicone (MYLICON) 80 MG chewable tablet Take 1 tablet by mouth every 6 hours as needed (cramping)  Patient not taking: Reported on 10/5/2021 2/3/21   Velma Rizvi MD   glucose monitoring kit (FREESTYLE) monitoring kit 1 kit by Does not apply route daily  Patient not taking: Reported on 10/5/2021 1/8/21   Len Escobedo MD   blood glucose test strips (GLUCOSE METER TEST) strip 1 each by In Vitro route 5 times daily As needed. 1/8/21   Len Escobedo MD   nitroGLYCERIN (NITROSTAT) 0.4 MG SL tablet DISSOLVE 1 TABLET UNDER THE TONGUE AS NEEDED FOR CHEST PAIN EVERY 5 MINUTES UP TO 3 TIMES. IF NO RELIEF CALL 911. 1/7/21   Len Escobedo MD   insulin aspart (NOVOLOG FLEXPEN) 100 UNIT/ML injection pen Inject 20 Units into the skin 3 times daily (before meals) 10/12/20   Len Escobedo MD   ondansetron (ZOFRAN ODT) 4 MG disintegrating tablet Take 1 tablet by mouth every 8 hours as needed for Nausea  Patient not taking: Reported on 10/5/2021 8/5/20   Len Escobedo MD   blood glucose test strips (FREESTYLE LITE) strip Daily As needed.  8/19/19   Len Escobedo MD   glucose monitoring kit (FREESTYLE) monitoring kit 1 kit by Does not apply route daily  Patient not taking: Reported on 10/5/2021 8/19/19   Len Escobedo MD   vitamin D (ERGOCALCIFEROL) 11641 units CAPS capsule TK 1 C PO WEEKLY 6/2/19   Historical Provider, MD   Tiotropium Bromide-Olodaterol (STIOLTO RESPIMAT) 2.5-2.5 MCG/ACT AERS Inhale 2 puffs into the lungs daily 5/21/19   Colleen Quispe MD   Polyethylene Glycol 3350 GRAN  5/2/18   Historical Provider, MD   Glucose Blood (BLOOD GLUCOSE TEST STRIPS) STRP TEST 3-4 TIMES DAILY, AS DIRECTED  Patient not taking: Reported on 10/5/2021 4/25/18   Mirta Reynolds MD   Blood Glucose Monitoring Suppl ADAM USE AS DIRECTED. 4/25/18   Mirta Reynolds MD   Alcohol Swabs PADS USE AS DIRECTED 4/25/18   Mirta Reynolds MD   albuterol sulfate  (90 Base) MCG/ACT inhaler Inhale 2 puffs into the lungs every 6 hours as needed for Wheezing 11/8/17   Rebeca Persaud MD   ipratropium-albuterol (DUONEB) 0.5-2.5 (3) MG/3ML SOLN nebulizer solution Inhale 3 mLs into the lungs every 6 hours as needed for Shortness of Breath 10/15/17   Del Arenas MD   calcium carbonate (TUMS) 500 MG chewable tablet Take 1 tablet by mouth 3 times daily as needed for Heartburn. Historical Provider, MD   aspirin 81 MG chewable tablet Take 1 tablet by mouth daily.   Patient taking differently: Take 81 mg by mouth daily Indications: stopped on 6/25 for surgery  5/14/13   Jayna Young MD      Scheduled Medications:    heparin (porcine)        torsemide  100 mg Oral Daily    carvedilol  12.5 mg Oral BID    linaclotide  145 mcg Oral QAM AC    traZODone  150 mg Oral Nightly    insulin glargine  30 Units SubCUTAneous BID    Calcium Acetate (Phos Binder)  667 mg Oral TID WC    DULoxetine  60 mg Oral Daily    gabapentin  100 mg Oral TID    isosorbide mononitrate  30 mg Oral Daily    losartan  25 mg Oral Daily    pantoprazole  40 mg Oral QAM AC    pravastatin  40 mg Oral Nightly    tiotropium-olodaterol  2 puff Inhalation Daily    QUEtiapine  25 mg Oral Nightly    sodium chloride flush  10 mL IntraVENous 2 times per day    aspirin  81 mg Oral Daily    Or    aspirin  300 mg Rectal Daily    insulin lispro  0.05 Units/kg SubCUTAneous TID     insulin lispro  0-12 Units SubCUTAneous TID     insulin lispro  0-6 Units SubCUTAneous Nightly    sodium chloride flush  10 mL IntraVENous 2 times per day    thiamine  100 mg IntraVENous Daily    multivitamin  1 tablet Oral Daily    nicotine  1 patch TransDERmal Daily    [Held by provider] heparin (porcine)  5,000 Units SubCUTAneous 3 times per day     Infusions:    dextrose      sodium chloride      sodium chloride       PRN Medications: oxyCODONE-acetaminophen, LORazepam, [Held by provider] cyclobenzaprine, glucose, dextrose, glucagon (rDNA), dextrose, sodium chloride flush, sodium chloride, promethazine **OR** ondansetron, senna, bisacodyl, labetalol, sodium chloride flush, sodium chloride, LORazepam **OR** LORazepam **OR** LORazepam **OR** LORazepam **OR** [DISCONTINUED] LORazepam **OR** [DISCONTINUED] LORazepam **OR** [DISCONTINUED] LORazepam **OR** [DISCONTINUED] LORazepam  Allergies:    Allergies   Allergen Reactions    Morphine Nausea And Vomiting       Past Medical History:   Diagnosis Date    Ambulatory dysfunction     walker for long distances, SOB with distance    Aortic stenosis     echo 2017    Arthritis     hands and hips    Asthma     Bilateral hilar adenopathy syndrome 6/3/2013    CAD (coronary artery disease)     Dr. Romina Low [facility]) 04/19/2019    EF= 43%    CHF (congestive heart failure) ([facility])     Chronic pain     COPD (chronic obstructive pulmonary disease) ([facility] [address]     pulmonology Dr. Hyacinth Benitez    Depression     Diabetes mellitus ([address]     borderline    Difficult intravenous access     Emphysema of lung ([address]     ESRD (end stage renal disease) on dialysis ([address]     MWF    Fear of needles     Gastric ulcer     GERD (gastroesophageal reflux disease)     Heart valve problem     bicuspic valve    Hemodialysis patient ([address]     History of spinal fracture     work incident    Hx of blood clots     Bilateral lower extremities; stents in place    Hyperlipidemia     Hypertension     MI (myocardial infarction) ([address] 2019    has had 9 MIs. 2019 was the last    Neuromuscular disorder ([address]     due to CVA    Numbness and tingling in left arm     from fistula    Pneumonia     PONV (postoperative nausea and vomiting)     Prolonged emergence from general anesthesia     States requires more medication than most people    Sleep apnea     Uses CPAP    Stroke ([facility] [address]     7mm thalamic cva 2017 deficts left side, left side weakness    TIA (transient ischemic attack)     Unspecified diseases of blood and blood-forming organs      Past Surgical History:   Procedure Laterality Date    AORTIC VALVE REPLACEMENT N/A 10/15/2019    TRANSCATHETER AORTIC VALVE REPLACEMENT FEMORAL APPROACH performed by Neo Wells MD at [address] [place], [place] [address] Right 7/2/2019    PERITONEAL DIALYSIS CATHETER REMOVAL performed by Rox De La Vega MD at [facility] COLONOSCOPY  2/29/2015    WNL    CORONARY ANGIOPLASTY WITH STENT PLACEMENT  05/26/15    CYST REMOVAL  08/14/2013    EXCISION CYSTS, NECK X2 AND ABDOMINAL benign    DIAGNOSTIC CARDIAC CATH LAB PROCEDURE      DIALYSIS FISTULA CREATION Left 10/30/2017    LEFT BRACHIAL CEPHALIC FISTULA    DIALYSIS FISTULA CREATION Left 3/27/2019    LIGATION  AV FISTULA performed by Adam Silverman MD at [address] [place] [address], [facility]      OTHER SURGICAL HISTORY  02/01/2017    laparoscopic cholecystectomy with intraoperative cholangiogram    OTHER SURGICAL HISTORY  2018    PORT PLACEMENT  - vas cath    OTHER SURGICAL HISTORY Bilateral 06/26/2018    laprascopic peritoneal dialysis catheter placement    OTHER SURGICAL HISTORY Right 09/2018    peritoneal dialysis port placed on right side of abdomen    OTHER SURGICAL HISTORY  05/28/2019    PTA/Stenting R External Iliac artery    MN LAP INSERTION TUNNELED INTRAPERITONEAL CATHETER N/A 9/21/2018    LAPAROSCOPIC PERITONEAL DIALYSIS CATHETER REPLACEMENT performed by Rox De La Vega MD at [facility]  01/06/2016    UPPER GASTROINTESTINAL ENDOSCOPY  01/29/2017 possible candida, otherwise normal appearing    VASCULAR SURGERY  aprx 2 years ago    2 stents placed, each side of groin       Social:   Social History     Tobacco Use    Smoking status: Current Every Day Smoker     Packs/day: 0.50     Years: 33.00     Pack years: 16.50     Types: Cigarettes     Last attempt to quit: 2020     Years since quittin.4    Smokeless tobacco: Never Used   Substance Use Topics    Alcohol use: Not Currently     Alcohol/week: 0.0 standard drinks     Comment: occ     Family:   Family History   Problem Relation Age of Onset    Diabetes Mother     Heart Disease Father     Kidney Disease Sister         stage 4-kidney failure    Cancer Sister     Heart Disease Sister     Obesity Sister     Cancer Sister     Heart Disease Sister     Obesity Sister     Alcohol Abuse Brother        ROS: Pertinent items are noted in HPI.     Objective:   Vital Signs:  Temp (24hrs), Av.2 °F (36.2 °C), Min:96.1 °F (35.6 °C), Max:98.1 °F (33.5 °C)     Systolic (52TST), PUD:236 , Min:104 , ZZ      Diastolic (80TJS), HJM:45, Min:62, Max:92     Pulse  Av.7  Min: 63  Max: 94  BP (!) 152/92   Pulse 81   Temp 98.1 °F (36.7 °C) (Oral)   Resp 18   Ht 5' 8\" (1.727 m)   Wt 255 lb 11.7 oz (116 kg)   SpO2 93%   BMI 38.88 kg/m²      Physical Exam:   BP (!) 102/57   Pulse 71   Temp 97.5 °F (36.4 °C) (Oral)   Resp 18   Ht 5' 8\" (1.727 m)   Wt 251 lb 5.2 oz (114 kg)   SpO2 96%   BMI 38.21 kg/m²   General appearance: alert, appears stated age and cooperative  Lungs: clear to auscultation bilaterally  Chest wall: no tenderness  Heart: regular rate and rhythm, S1, S2 normal, no murmur, click, rub or gallop  Abdomen: soft, non-tender; bowel sounds normal; no masses,  no organomegaly  Extremities: extremities normal, atraumatic, no cyanosis or edema  Skin: Skin color, texture, turgor normal. No rashes or lesions  Neurologic: Grossly normal    Lab and Imaging Review   Recent Labs 10/11/21  1520 10/11/21  2040 10/12/21  0607 10/12/21  0608 10/12/21  2120 10/13/21  0656   WBC 11.4*  --  18.5*  --   --  12.1*   HGB 11.2*  --  11.3*  --  11.1* 10.6*   MCV 90.2  --  90.8  --   --  89.7     --  247  --   --  220   INR  --  0.98  --   --   --   --      --   --  143  --  140   K 4.7  --   --  5.1  --  3.9     --   --  105  --  103   CO2 22  --   --  22  --  20*   BUN 76*  --   --  79*  --  67*   CREATININE 8.3*  --   --  8.1*  --  7.2*   GLUCOSE 167*  --   --  78  --  52*   CALCIUM 9.2  --   --  9.4  --  8.8   PROT 7.0  --   --  6.9  --   --    LABALBU 3.7  --   --  3.9  --  3.2*   AST 13*  --   --  9*  --   --    ALT 10  --   --  9*  --   --    ALKPHOS 100  --   --  100  --   --    BILITOT <0.2  --   --  <0.2  --   --    AMMONIA 14*  --   --   --   --   --    LIPASE  --   --   --  28.0  --   --    MG  --  2.30  --   --   --  2.20       Assessment:     Patient Active Problem List    Diagnosis Date Noted    Hypotension due to drugs 11/25/2017    Acute on chronic combined systolic and diastolic heart failure (HCC)     Ischemic cardiomyopathy     Elevated brain natriuretic peptide (BNP) level     Dyslipidemia     Type 2 diabetes, uncontrolled, with neuropathy ([facility]) 03/04/2014    Bicuspid aortic valve 06/03/2013    CAD S/P percutaneous coronary angioplasty 05/14/2013    PVD (peripheral vascular disease) ([facility] 05/14/2013    S/P TAVR (transcatheter aortic valve replacement) 10/19/2019    Chest pain     Essential hypertension 11/14/2013    Chronic obstructive pulmonary disease ([facility] 05/14/2013    Acute CVA (cerebrovascular accident) ([facility] 10/11/2021    Alcohol abuse, daily use 10/11/2021    Speech problem     Left face and left arm tingling     Urinary tract infection with hematuria     Acute cerebrovascular accident (CVA) ([facility] 09/07/2021    CHF (congestive heart failure) ([address] 08/13/2021    Acute hypoxemic respiratory failure (HCC) 08/12/2021    Type 2 diabetes mellitus with hyperglycemia ([address] 06/27/2021    Acute encephalopathy 06/27/2021    Cellulitis and abscess of hand 04/24/2021    Iliac artery occlusion, right ([address]     Cellulitis of right foot 01/29/2021    Peripheral vascular occlusive disease ([address] 01/02/2021    Ataxia     Weakness of both lower extremities 12/30/2020    Sinus bradycardia 12/30/2020    Sinus pause     GBS (Guillain-Dover syndrome) ([facility])     Bilateral leg weakness 12/04/2020    Obesity 11/03/2020    DKA, type 1, not at goal [facility]) 04/25/2020    Generalized weakness 02/04/2020    Former smoker 11/19/2019    Bradycardia 10/19/2019    Syncope and collapse 10/19/2019    Atrial fibrillation ([address]     Hypercholesteremia 07/30/2019    Chronic bronchitis ([facility]) 05/21/2019    Nasal congestion 05/21/2019    Chronic, continuous use of opioids 04/15/2019    Steal syndrome of dialysis vascular access ([facility])     SOB (shortness of breath) 88/69/2004    Diastolic dysfunction 45/03/3226    Anemia of chronic disease 11/12/2018    Acute respiratory failure ([address] 11/09/2018    Pulmonary edema 11/09/2018    Fluid overload 11/09/2018    Renovascular hypertension     Mixed hyperlipidemia     Cigarette nicotine dependence in remission     Acute combined systolic and diastolic CHF, NYHA class 4 ([address] 09/14/2018    Neuromuscular disorder ([facility])     Acute diastolic CHF (congestive heart failure) ([address] 03/18/2018    Hemodialysis-associated hypotension 11/22/2017    Left arm pain 11/22/2017    Dizziness 11/22/2017    ESRD (end stage renal disease) on dialysis ([address] 11/22/2017    Mucus plugging of bronchi     Hypervolemia     Hyperkalemia     Nonrheumatic aortic valve stenosis     Pleural effusion     Chronic anemia     Diarrhea 08/04/2017    Arterial ischemic stroke, ICA, right, acute ([address]     Type 2 diabetes mellitus without complication, without long-term current use of insulin ([address]     HTN (hypertension), benign     ZAINAB (obstructive sleep apnea)     Tobacco abuse 05/21/2017    CKD stage 4 due to type 2 diabetes mellitus ([address] 05/21/2017    CVA (cerebral vascular accident) ([address] 05/21/2017    Chronic renal failure, stage 5 ([address] 03/07/2017    Chest pressure 02/17/2017    Hypertensive urgency 02/17/2017    Hypoxia     Respiratory distress     Angina, class IV (HCC)     Dyspnea     Gastroparesis due to DM ([address]     Biliary colic 88/54/0629    Symptomatic cholelithiasis 01/04/2017    Degeneration of lumbar or lumbosacral intervertebral disc 03/18/2016    Lumbar radiculopathy 03/18/2016    Lumbosacral spondylosis without myelopathy 03/18/2016    ZAINAB on CPAP 02/11/2016    Coronary artery disease involving native coronary artery of native heart without angina pectoris     Obesity (BMI 30-39. 9)     CHF exacerbation ([address] 05/24/2015    Hypertensive heart disease with congestive heart failure with preserved left ventricular function ([address] 04/24/2015    Pneumonia of right upper lobe due to infectious organism 03/28/2015    DM (diabetes mellitus), secondary, uncontrolled, w/neurologic complic ([address] 40/88/9124    Hematoma 03/17/2015    Depression with anxiety 06/05/2014    Passive smoke exposure 05/30/2014    Diabetic neuropathy ([address] 11/14/2013    Claudication in peripheral vascular disease ([address] 06/26/2013    Sleep apnea 06/03/2013    Bilateral hilar adenopathy syndrome 06/03/2013    Nonischemic cardiomyopathy ([address] 05/22/2013    Acute respiratory failure with hypoxia and hypercapnia ([address] 05/14/2013     45 yo w DM, CHF, ESRD on HD, ZAINAB on CPAP, COPD with tobacco and EtOH abuse, admitted for ?subacute CVA, developed 1 episode of hematochezia the night of 10/12. H/H 11/32    Plan:   1. Supportive care  2. Avoid constipation  3. Needs colonoscopy prior to D/C, but when medically stable  4. Daily H/H  5.  Will follow    Pedro Schaffer MD       O) [phone]

## 2021-10-14 NOTE — CODE DOCUMENTATION
Pt to get small 250cc bolus, to speak with attending physician about Midodrine, parameters to be placed for BP medications. Pt remains alert and oriented.

## 2021-10-14 NOTE — PROGRESS NOTES
Hospitalist Progress Note      PCP: Shanta Cohen MD    Date of Admission: 10/11/2021    Chief Complaint: Altered mental status    Subjective: No new c/o.        Medications:  Reviewed    Infusion Medications    dextrose 100 mL/hr (10/14/21 0515)    sodium chloride      sodium chloride       Scheduled Medications    torsemide  100 mg Oral Daily    carvedilol  12.5 mg Oral BID    linaclotide  145 mcg Oral QAM AC    traZODone  150 mg Oral Nightly    insulin glargine  30 Units SubCUTAneous BID    Calcium Acetate (Phos Binder)  667 mg Oral TID WC    DULoxetine  60 mg Oral Daily    gabapentin  100 mg Oral TID    isosorbide mononitrate  30 mg Oral Daily    losartan  25 mg Oral Daily    pantoprazole  40 mg Oral QAM AC    pravastatin  40 mg Oral Nightly    tiotropium-olodaterol  2 puff Inhalation Daily    QUEtiapine  25 mg Oral Nightly    sodium chloride flush  10 mL IntraVENous 2 times per day    aspirin  81 mg Oral Daily    Or    aspirin  300 mg Rectal Daily    insulin lispro  0.05 Units/kg SubCUTAneous TID WC    insulin lispro  0-12 Units SubCUTAneous TID WC    insulin lispro  0-6 Units SubCUTAneous Nightly    sodium chloride flush  10 mL IntraVENous 2 times per day    thiamine  100 mg IntraVENous Daily    multivitamin  1 tablet Oral Daily    nicotine  1 patch TransDERmal Daily    [Held by provider] heparin (porcine)  5,000 Units SubCUTAneous 3 times per day     PRN Meds: oxyCODONE-acetaminophen, LORazepam, [Held by provider] cyclobenzaprine, glucose, dextrose, glucagon (rDNA), dextrose, sodium chloride flush, sodium chloride, promethazine **OR** ondansetron, senna, bisacodyl, labetalol, sodium chloride flush, sodium chloride, LORazepam **OR** LORazepam **OR** LORazepam **OR** LORazepam **OR** [DISCONTINUED] LORazepam **OR** [DISCONTINUED] LORazepam **OR** [DISCONTINUED] LORazepam **OR** [DISCONTINUED] LORazepam      Intake/Output Summary (Last 24 hours) at 10/14/2021 0855  Last data filed at 10/13/2021 1930  Gross per 24 hour   Intake 600 ml   Output 3600 ml   Net -3000 ml       Physical Exam Performed:    /81   Pulse 70   Temp 97.5 °F (36.4 °C) (Axillary)   Resp 18   Ht 5' 8\" (1.727 m)   Wt 251 lb 5.2 oz (114 kg)   SpO2 95%   BMI 38.21 kg/m²     General appearance: No apparent distress, appears stated age and cooperative. HEENT: Pupils equal, round, and reactive to light. Conjunctivae/corneas clear. Neck: Supple, with full range of motion. No jugular venous distention. Trachea midline. Respiratory:  Normal respiratory effort. Clear to auscultation, bilaterally without Rales/Wheezes/Rhonchi. Cardiovascular: Regular rate and rhythm with normal S1/S2 without murmurs, rubs or gallops. Abdomen: Soft, non-tender, non-distended with normal bowel sounds. Musculoskeletal: No clubbing, cyanosis or edema bilaterally. Full range of motion without deformity. Skin: Skin color, texture, turgor normal.  No rashes or lesions. Neurologic:  Neurovascularly intact without any focal sensory/motor deficits. Cranial nerves: II-XII intact, grossly non-focal.  Psychiatric: Alert and oriented, thought content appropriate, normal insight  Capillary Refill: Brisk,< 3 seconds   Peripheral Pulses: +2 palpable, equal bilaterally       Labs:   Recent Labs     10/12/21  0607 10/12/21  0607 10/12/21  2120 10/13/21  0656 10/14/21  0632   WBC 18.5*  --   --  12.1* 8.6   HGB 11.3*   < > 11.1* 10.6* 11.1*   HCT 35.1*   < > 33.9* 32.2* 33.6*     --   --  220 194    < > = values in this interval not displayed.      Recent Labs     10/12/21  0608 10/13/21  0656 10/14/21  0645    140 138   K 5.1 3.9 4.0    103 101   CO2 22 20* 22   BUN 79* 67* 37*   CREATININE 8.1* 7.2* 5.7*   CALCIUM 9.4 8.8 8.8   PHOS  --  7.2*  --      Recent Labs     10/11/21  1520 10/12/21  0608   AST 13* 9*   ALT 10 9*   BILITOT <0.2 <0.2   ALKPHOS 100 100     Recent Labs     10/11/21  2040   INR 0.98     Recent Labs - on HD M/W/F. Patient non-compliant and arrived in volume overload. Nephrology consulted and appreciated s/p inpatient HD    Encephalopathy - acute metabolic, 2nd to above. Will continue to follow clinical response w/ supportive care PRN. COPD - w/out chronic respiratory failure on no baseline home O2. Controlled on home medication regimen - continued. DM2 - controlled on home oral antiGlycemics/Insulin - held. Started on weight based Lantus - titrated down 14 October and follow FSBS/SSI medium regimen. Last HbA1c 9.3% dated this admission. Anticipate resuming/continuing home regimen at discharge. Morbid Obesity -  With Body mass index is 38.21 kg/m². Complicating assessment and treatment. Placing patient at risk for multiple co-morbidities as well as early death and contributing to the patient's presentation. Counseled on weight loss. ZAINAB - likely 2nd to obesity. Controlled on home CPAP/BiPap - continued, w/ outpatient f/u as previously arranged.      Alcohol Abuse - presumed active and ongoing w/ dependence but no signs/sxs of w/drawal - patient denies. Cessation counseled.         DVT Prophylaxis: SQ Heparin/IPC     Recent Labs     10/12/21  0607 10/13/21  0656 10/14/21  0632    220 194     Diet: ADULT DIET; Regular  Code Status: Full Code      PT/OT Eval Status: seen w/ recs for SNF    Dispo - Possibly Friday 15 October pending clinical course, subspecialty recs and placement decision.      Evan Johnson MD

## 2021-10-14 NOTE — PROGRESS NOTES
Rachel Herrmann  Neurology Follow-up  [facility] Neurology    Date of Service: 10/14/2021    Subjective:   CC: Follow up today regarding: Altered mental status    Events noted. Chart and lab reviewed. OOB to chair this AM.  Feels much improved. Long discussion with patient regarding smoking cessation and secondary stroke prevention. ROS : A 10-12 system review obtained and updated today and is unremarkable except as mentioned  in my interval history. family history includes Alcohol Abuse in his brother; Cancer in his sister and sister; Diabetes in his mother; Heart Disease in his father, sister, and sister; Kidney Disease in his sister; Obesity in his sister and sister.     Past Medical History:   Diagnosis Date    Ambulatory dysfunction     walker for long distances, SOB with distance    Aortic stenosis     echo 2017    Arthritis     hands and hips    Asthma     Bilateral hilar adenopathy syndrome 6/3/2013    CAD (coronary artery disease)     Dr. Jeromy King [facility]) 04/19/2019    EF= 43%    CHF (congestive heart failure) (HCC)     Chronic pain     COPD (chronic obstructive pulmonary disease) ([address]     pulmonology Dr. Sierra Pyo    Depression     Diabetes mellitus ([address]     borderline    Difficult intravenous access     Emphysema of lung ([address]     ESRD (end stage renal disease) on dialysis ([address]     MWF    Fear of needles     Gastric ulcer     GERD (gastroesophageal reflux disease)     Heart valve problem     bicuspic valve    Hemodialysis patient ([address]     History of spinal fracture     work incident    Hx of blood clots     Bilateral lower extremities; stents in place    Hyperlipidemia     Hypertension     MI (myocardial infarction) ([address] 2019    has had 9 MIs. 2019 was the last    Neuromuscular disorder ([address]     due to CVA    Numbness and tingling in left arm     from fistula    Pneumonia     PONV (postoperative nausea and vomiting)     Prolonged emergence from general anesthesia     States requires more medication than most people    Sleep apnea     Uses CPAP    Stroke ([facility]     7mm thalamic cva 2017 deficts left side, left side weakness    TIA (transient ischemic attack)     Unspecified diseases of blood and blood-forming organs      Current Facility-Administered Medications   Medication Dose Route Frequency Provider Last Rate Last Admin    insulin glargine (LANTUS) injection vial 30 Units  30 Units SubCUTAneous Nightly Rosemary Olivier MD        oxyCODONE-acetaminophen (PERCOCET) 5-325 MG per tablet 1 tablet  1 tablet Oral Q6H PRN JAY Burch - CNP   1 tablet at 10/14/21 0945    LORazepam (ATIVAN) injection 1 mg  1 mg IntraVENous Q4H PRN Rosemary Olivier MD   1 mg at 10/13/21 1513    torsemide (DEMADEX) tablet 100 mg  100 mg Oral Daily Anthony Riojas MD   100 mg at 10/14/21 0845    carvedilol (COREG) tablet 12.5 mg  12.5 mg Oral BID Anthony Riojas MD   12.5 mg at 10/14/21 0845    [Held by provider] cyclobenzaprine (FLEXERIL) tablet 10 mg  10 mg Oral TID PRN Anthony Riojas MD        linaclotide Reilly Lion) capsule 145 mcg  145 mcg Oral QAM AC Anthony Riojas MD        traZODone (DESYREL) tablet 150 mg  150 mg Oral Nightly Anthony Riojas MD   150 mg at 10/11/21 2232    Calcium Acetate (Phos Binder) CAPS 667 mg  667 mg Oral TID WC Anthony Riojas MD   667 mg at 10/14/21 0845    DULoxetine (CYMBALTA) extended release capsule 60 mg  60 mg Oral Daily Anthony Riojas MD   60 mg at 10/14/21 0845    gabapentin (NEURONTIN) capsule 100 mg  100 mg Oral TID Anthony Riojas MD   100 mg at 10/14/21 0845    isosorbide mononitrate (IMDUR) extended release tablet 30 mg  30 mg Oral Daily Anthony Riojas MD   30 mg at 10/14/21 0845    losartan (COZAAR) tablet 25 mg  25 mg Oral Daily Anthony Riojas MD   25 mg at 10/14/21 0845    pantoprazole (PROTONIX) tablet 40 mg  40 mg Oral QAM AC Anthony Riojas MD   40 mg at 10/14/21 0848    pravastatin (PRAVACHOL) tablet 40 mg  40 mg Oral Nightly Holland Corea MD   40 mg at 10/12/21 2152    tiotropium-olodaterol (STIOLTO) 2.5-2.5 MCG/ACT inhaler 2 puff  2 puff Inhalation Daily Holland Corea MD   2 puff at 10/14/21 0815    QUEtiapine (SEROQUEL) tablet 25 mg  25 mg Oral Nightly Holland Corea MD   25 mg at 10/12/21 2152    glucose (GLUTOSE) 40 % oral gel 15 g  15 g Oral PRN Holland Corea MD   15 g at 10/13/21 0853    dextrose 50 % IV solution  12.5 g IntraVENous PRN Holland Corea MD   12.5 g at 10/14/21 0554    glucagon (rDNA) injection 1 mg  1 mg IntraMUSCular PRN Holland Corea MD        dextrose 5 % solution  100 mL/hr IntraVENous PRN Holland Corea  mL/hr at 10/14/21 0515 100 mL/hr at 10/14/21 0515    sodium chloride flush 0.9 % injection 10 mL  10 mL IntraVENous 2 times per day Holland Corea MD   10 mL at 10/13/21 2125    sodium chloride flush 0.9 % injection 10 mL  10 mL IntraVENous PRN Holland Corea MD        0.9 % sodium chloride infusion  25 mL IntraVENous PRN Holland Corea MD        promethazine (PHENERGAN) tablet 12.5 mg  12.5 mg Oral Q6H PRN Holland Corea MD        Or    on[facility]ron [facility]) injection 4 mg  4 mg IntraVENous Q6H PRN Holland Corea MD   4 mg at 10/13/21 2123    senna (SENOKOT) tablet 8.6 mg  1 tablet Oral Daily PRN Holland Corea MD        bisacodyl (DULCOLAX) suppository 10 mg  10 mg Rectal Daily PRN Holland Corea MD        aspirin EC tablet 81 mg  81 mg Oral Daily Holland Corea MD   81 mg at 10/14/21 0845    Or    aspirin suppository 300 mg  300 mg Rectal Daily Holland Corea MD        labetalol (NORMODYNE;TRANDATE) injection 10 mg  10 mg IntraVENous Q10 Min PRN Holland Corea MD        insulin lispro (HUMALOG) injection vial 6 Units  0.05 Units/kg SubCUTAneous TID WC Holland Corea MD        insulin lispro (HUMALOG) injection vial 0-12 Units  0-12 Units SubCUTAneous TID  Holland Corea MD       [facility] insulin lispro (HUMALOG) injection vial 0-6 Units  0-6 Units SubCUTAneous Nightly Shell Kat MD   1 Units at 10/12/21 2041    sodium chloride flush 0.9 % injection 10 mL  10 mL IntraVENous 2 times per day Shell Kat MD   10 mL at 10/14/21 0846    sodium chloride flush 0.9 % injection 10 mL  10 mL IntraVENous PRN Shell Kat MD        0.9 % sodium chloride infusion  25 mL IntraVENous PRN Shell Kat MD        LORazepam (ATIVAN) tablet 1 mg  1 mg Oral Q1H PRN Shell Kat MD        Or    LORazepam (ATIVAN) injection 1 mg  1 mg IntraVENous Q1H PRN Shell Kat MD   1 mg at 10/11/21 2032    Or    LORazepam (ATIVAN) tablet 2 mg  2 mg Oral Q1H PRN Shell Kat MD        Or    LORazepam (ATIVAN) injection 2 mg  2 mg IntraVENous Q1H PRN Shell aKt MD   2 mg at 10/11/21 2239    thiamine (B-1) injection 100 mg  100 mg IntraVENous Daily Shell Kat MD   100 mg at 10/14/21 3417    therapeutic multivitamin-minerals 1 tablet  1 tablet Oral Daily hSell Kat MD   1 tablet at 10/14/21 0845    nicotine (NICODERM CQ) 14 MG/24HR 1 patch  1 patch TransDERmal Daily Shell Kat MD   1 patch at 10/14/21 0845    [Held by provider] heparin (porcine) injection 5,000 Units  5,000 Units SubCUTAneous 3 times per day Shell Kat MD   5,000 Units at 10/12/21 0551     Allergies   Allergen Reactions    Morphine Nausea And Vomiting      reports that he has been smoking cigarettes. He has a 16.50 pack-year smoking history. He has never used smokeless tobacco. He reports previous alcohol use. He reports that he does not use drugs.        Objective:  Exam:   Constitutional:   Vitals:    10/14/21 0628 10/14/21 0715 10/14/21 0818 10/14/21 1004   BP:  132/81  (!) 145/97   Pulse:  70  79   Resp:  18 18    Temp:  97.5 °F (36.4 °C)     TempSrc:  Axillary     SpO2:  95% 95% 97%   Weight: 251 lb 5.2 oz (114 kg)      Height:         General appearance:  Normal development and appear in no acute distress. Mental Status:   Oriented to person, place, problem, and time. Memory: Good immediate recall. Intact remote memory  Normal attention span and concentration. Language: intact naming, repeating and fluency   Good fund of Knowledge. Cranial Nerves:   II: Visual fields: Full. Pupils: equal, round, reactive to light  III,IV,VI: Extra Ocular Movements are intact. No nystagmus  V: Facial sensation is intact  VII: Facial strength and movements: intact and symmetric  IX: Palate elevation is symmetric  XI: Shoulder shrug is intact  XII: Tongue movements are normal  Musculoskeletal: 5/5 in all 4 extremities. Tone: Normal tone. Reflexes: Symmetric 2+ in both arms and legs. Coordination: no pronator drift, no dysmetria with FNF  Sensation: normal to all modalities in both arms and legs. Gait/Posture: steady gait        Data:  LABS:   Lab Results   Component Value Date     10/14/2021    K 4.0 10/14/2021    K 5.1 10/12/2021     10/14/2021    CO2 22 10/14/2021    BUN 37 10/14/2021    CREATININE 5.7 10/14/2021    GFRAA 13 10/14/2021    GFRAA >60 05/17/2013    LABGLOM 10 10/14/2021    GLUCOSE 73 10/14/2021    PHOS 7.2 10/13/2021    MG 2.00 10/14/2021    CALCIUM 8.8 10/14/2021     Lab Results   Component Value Date    WBC 8.6 10/14/2021    RBC 3.73 10/14/2021    HGB 11.1 10/14/2021    HCT 33.6 10/14/2021    MCV 90.2 10/14/2021    RDW 15.5 10/14/2021     10/14/2021     Lab Results   Component Value Date    INR 0.98 10/11/2021    PROTIME 11.1 10/11/2021       Neuroimaging was independently reviewed by me and discussed results with the patient  I reviewed blood testing and other test results and discussed results with the patient      Impression:    Subacute lacunar CVA, likely ischemic, anterior left thalamus. Suspect medication non-compliance is an issue.   Recent right cerebellar CVA 9/2021  ESRD on HD  HTN  CAD  DM2 with neuropathy, uncontrolled.  A1c

## 2021-10-14 NOTE — PROGRESS NOTES
10/14/21 0005   NIV Type   $NIV $Daily Charge   NIV Started/Stopped On   Equipment Type v60   Mode Bilevel   Mask Type Full face mask   Mask Size Large   Settings/Measurements   IPAP 14 cmH20   CPAP/EPAP 6 cmH2O   Rate Ordered 15   Resp 20   FiO2  30 %   Vt Exhaled 532 mL   Mask Leak (lpm) 30 lpm   Comfort Level Good   Using Accessory Muscles No   Alarm Settings   Alarms On Y   Press Low Alarm 6 cmH2O   High Pressure Alarm 30 cmH2O   Resp Rate Low Alarm 6   High Respiratory Rate 40 br/min

## 2021-10-14 NOTE — SIGNIFICANT EVENT
Responded to rapid response called by RN stating patient's blood pressure dropped to 86Q systolic and complaining of lightheadedness, dizziness associated with bilateral upper extremity tingling/numbness. Upon arrival RRT at bedside. Patient noted to be in Trendelenburg position. RN reports patient received blood pressure medications this morning. Patient known to this provider from his prior admissions. Patient usually takes midodrine on his dialysis days Monday Wednesday Friday. Systolic blood pressure 26D. Ordered to 50 cc fluid bolus over. Of 2 hours and requested RN to check vitals every 15 minutes for the next couple of hours. Also will hold antihypertensive medications and place parameters. Will let primary attending know.     Drea Bar MD  Hospitalist Physician [facility] Cover)

## 2021-10-14 NOTE — PROGRESS NOTES
[facility] Progress Note           Subjective/   46y.o. year old male who we are seeing in consultation for ESRD. HPI:  Last HD on 10/13 with 3 liters removed, post-weight of 116 kg.  +weak. Mentation better. ROS:  Denies any shortness of breath. Intake adequate. Objective/   GEN:  Chronically ill, /81   Pulse 70   Temp 97.5 °F (36.4 °C) (Axillary)   Resp 18   Ht 5' 8\" (1.727 m)   Wt 251 lb 5.2 oz (114 kg)   SpO2 95%   BMI 38.21 kg/m²   HEENT: non-icteric, no JVD  CV: S1, S2 without m/r/g; trace LE edema  RESP: CTA B without w/r/r; breathing wnl  ABD: +bs, soft, nt, no hsm  SKIN: warm, no rashes  ACCESS: Left IJ [facility] [place]    Data/  Recent Labs     10/12/21  0607 10/12/21  0607 10/12/21  2120 10/13/21  0656 10/14/21  0632   WBC 18.5*  --   --  12.1* 8.6   HGB 11.3*   < > 11.1* 10.6* 11.1*   HCT 35.1*   < > 33.9* 32.2* 33.6*   MCV 90.8  --   --  89.7 90.2     --   --  220 194    < > = values in this interval not displayed. Recent Labs     10/11/21  1520 10/11/21  2040 10/12/21  0608 10/13/21  0656 10/14/21  0645   NA   < >  --  143 140 138   K   < >  --  5.1 3.9 4.0   CL   < >  --  105 103 101   CO2   < >  --  22 20* 22   GLUCOSE   < >  --  78 52* 73   PHOS  --   --   --  7.2*  --    MG  --  2.30  --  2.20 2.00   BUN   < >  --  79* 67* 37*   CREATININE   < >  --  8.1* 7.2* 5.7*   LABGLOM   < >  --  7* 8* 10*   GFRAA   < >  --  8* 10* 13*    < > = values in this interval not displayed.        Assessment/     -ESRD on HD MWF     -Recent CVA, now new CVA/Acute metabolic encephalopathy - plans per Neurology     -Hypertension - high on admission, now within range     -Anemia of chronic disease - Hb 11.1     -Leukocytosis - resolved       Plan/     - HD tomorrow per schedule -  kg, lowered now to 116 kg  - Holding on any DAVON dosing   - Trend labs, bp's, blood cultures ntd     ____________________________________  Christy Mayorga MD  [facility] [address] [place] [address]. [facility]  Office: [phone]

## 2021-10-14 NOTE — PROGRESS NOTES
10/14/21 0318   NIV Type   NIV Started/Stopped On   Equipment Type v60   Mode Bilevel   Mask Type Full face mask   Mask Size Large   Settings/Measurements   IPAP 14 cmH20   CPAP/EPAP 6 cmH2O   Rate Ordered 16   Resp 16   FiO2  30 %   Vt Exhaled 534 mL   Mask Leak (lpm) 80 lpm   Comfort Level Good   Using Accessory Muscles No   Alarm Settings   Alarms On Y   Press Low Alarm 6 cmH2O   High Pressure Alarm 30 cmH2O   Resp Rate Low Alarm 6   High Respiratory Rate 40 br/min

## 2021-10-14 NOTE — PROGRESS NOTES
Speech Language Pathology  Facility/Department: [place] [address] - MED SURG/ORTHO  Dysphagia and Speech/Lang/Cog Daily Treatment Note      Ongoing ST recommended after d/c to address speech/lang/cog deficits      NAME: Silvia ZAMAN: 1970  MRN: [id number]    Patient Diagnosis(es):   Patient Active Problem List    Diagnosis Date Noted    Hypotension due to drugs 2017    Acute on chronic combined systolic and diastolic heart failure ([address]     Ischemic cardiomyopathy     Elevated brain natriuretic peptide (BNP) level     Dyslipidemia     Type 2 diabetes, uncontrolled, with neuropathy ([address] 2014    Bicuspid aortic valve 2013    CAD S/P percutaneous coronary angioplasty 2013    PVD (peripheral vascular disease) ([address] 2013    S/P TAVR (transcatheter aortic valve replacement) 10/19/2019    Chest pain     Essential hypertension 2013    Chronic obstructive pulmonary disease ([address] 2013    Acute CVA (cerebrovascular accident) ([address] 10/11/2021    Alcohol abuse, daily use 10/11/2021    Speech problem     Left face and left arm tingling     Urinary tract infection with hematuria     Acute cerebrovascular accident (CVA) ([address] 2021    CHF (congestive heart failure) ([address] 2021    Acute hypoxemic respiratory failure ([address] 2021    Type 2 diabetes mellitus with hyperglycemia ([address] 2021    Acute encephalopathy 2021    Cellulitis and abscess of hand 2021    Iliac artery occlusion, right (HCC)     Cellulitis of right foot 2021    Peripheral vascular occlusive disease ([address] 2021    Ataxia     Weakness of both lower extremities 2020    Sinus bradycardia 2020    Sinus pause     GBS (Guillain-[place] syndrome) (HCC)     Bilateral leg weakness 2020    Obesity 2020    DKA, type 1, not at goal [facility]) 2020    Generalized weakness 2020    Former smoker 2019    Lumbosacral spondylosis without myelopathy 03/18/2016    ZAINAB on CPAP 02/11/2016    Coronary artery disease involving native coronary artery of native heart without angina pectoris     Obesity (BMI 30-39. 9)     [facility] exacerbation [facility] 05/24/2015    Hypertensive heart disease with congestive heart failure with preserved left ventricular function ([facility] 04/24/2015    Pneumonia of right upper lobe due to infectious organism 03/28/2015    DM (diabetes mellitus), secondary, uncontrolled, w/neurologic complic ([facility] 18/33/8549    Hematoma 03/17/2015    Depression with anxiety 06/05/2014    Passive smoke exposure 05/30/2014    Diabetic neuropathy ([facility] 11/14/2013    Claudication in peripheral vascular disease [facility] 06/26/2013    Sleep apnea 06/03/2013    Bilateral hilar adenopathy syndrome 06/03/2013    Nonischemic cardiomyopathy ([facility] 05/22/2013    Acute respiratory failure with hypoxia and hypercapnia ([facility] 05/14/2013     Allergies: Allergies   Allergen Reactions    Morphine Nausea And Vomiting     Subjective: Pt seen upright in bedside chair, breakfast tray present. RN OK'd SLP entry and therapy. Pain: no c/o pain    Current Diet: ADULT DIET; Regular    Diet Tolerance:  Patient tolerating current diet level without signs/symptoms of penetration / aspiration per chart, pt. P.O. Trials: Thin   x Indirectly observed, thin liquid via cup (with and without PO meds administered by RN)    Nectar / Mildly Thick       Honey / Moderately Thick       Pudding / Extremely Thick       Puree       Solid   x Bites of scrambled eggs, reyna     Dysphagia Treatment and Impressions:  Pt indirectly observed with sips of coffee via cup, bites of solid PO from breakfast tray. No clinical s/s of aspiration observed. Pt denied dysphagia, no reports of dysphagia per chart. Continue current diet.     Dysphagia Goals:  Timeframe for Long-term Goals: 7 days (10/19/21)  Goal 1: The pt will tolerate safest and least restrictive diet without clinical s/s of aspiration. 10/14: ongoing, continue current diet     Short-term Goals  Timeframe for Short-term Goals: 5 days (10/17/21)  1) The patient will tolerate recommended diet without observed clinical signs of aspiration. 10/14: ongoing, see above  2) The patient/caregiver will demonstrate understanding of compensatory strategies for improved swallowing safety. 10/14: pt verbalized understanding    Speech/Language/Cog Goals:  Timeframe for Long-term Goals: 7 days (10/19/21)  Goal 1: The pt will effectively use reviewed compensatory strategies for improved safety & independence upon d/c. 10/14: significant improvement this date. Pt demonstrated good awareness of cognitive deficits, previous confusion. He reported \"I get these periods of confusion\" and \"my blood sugar has been all over the place\". Pt had dialysis yesterday as well. Pt was able to self-report that he \"gets unsteady on my feet\" and needs rest breaks when walking short distances. Pt agreeable to ST after d/c for high-level cognitive tx. He endorsed having \"trouble with memory for years\". He went on to say that he is \"no longer in a routine\" d/t no longer working. Short-term Goals  Timeframe for Short-term Goals: 5 days (10/17/21)  Goal 1: The pt will be oriented x4, min-no cues. 10/14: oriented x4, no cues. GOAL MET    Goal 2: The pt will complete basic comprehension tasks with 95% % accuracy, no  cues. 10/14: indirectly targeted throughout; pt verbalized understanding of all strategies / information discussed this date, rationale for ongoing tx after d/c. GOAL UPGRADED    Goal 3: The pt will complete functional problem-solving tasks with 95% % accuracy, no cues. 10/14: verbal reasoning questions / scenarios: 100% accuracy, no cues to complete. Pt able to provide quick, effective responses to all questions.  GOAL UPGRADED    Goal 4: The pt will complete functional graded recall tasks with 90% % accuracy, min cues. 10/14: SLP and pt discussed use of various external recall aids at home for improved recall (I.e. alarms / notes on iPhone). GOAL UPGRADED      Recommendations:  Solid Consistency: Regular solid diet  Liquid Consistency: Thin liquids  Medication: Meds whole with TL    Patient/Family/Caregiver Education:  SLP re: role of ST, rationale for ongoing ST after d/c.  Pt verbalized understanding. Compensatory Strategies:  HOB 90* and 30\" after meals; small bites/sips; alternate solids/liquids every 3-5 bites; oral care after every meal    Plan:    Continued Dysphagia treatment with goals per plan of care. Discharge Recommendations: per PT/OT recs; pt would benefit from ongoing ST after d/c    If pt discharges from hospital prior to Speech/Swallowing discharge, this note serves as tx and discharge summary.      Total Treatment Time / Charges     Time in Time out Total Time / units   Cognitive Tx  0843 [id number] 25 min / 2 units   Speech Tx      Dysphagia Tx        Signature:  Brodie Faustin M.S. [id number] [facility]  Speech-language pathologist  [id number]  Speech Desk Phone: [phone]

## 2021-10-14 NOTE — PROGRESS NOTES
Physical Therapy  Facility/Department: [facility] C5 - MED SURG/ORTHO  Daily Treatment Note  NAME: Lucia Malone  : 1970  MRN: [id number]    Date of Service: 10/14/2021    Discharge Recommendations:  24 hour supervision or assist, Home with Home health PT   PT Equipment Recommendations  Other: CTA. Has rollator at home  If pt is unable to be seen after this session, please let this note serve as discharge summary. Please see case management note for discharge disposition. Thank you. Assessment   Body structures, Functions, Activity limitations: Decreased strength;Decreased balance;Decreased functional mobility ; Increased pain;Decreased endurance;Decreased posture  Assessment: Pt with improved balace with ambulation using a RW. Continues to demonstrate decreased insight into healthy habits and roles of healthcare providers. Pt was able to ambulate with RW x 30' and 15' without AD and CGA that was unsteady but no LOB. Pt ambulated to the bathroom where discovered was incontinent of bowel. Was able to remove underware and shorts. Pt remained sitting on the toilet at EOS stated he needed more time. PCA aware and able to assist. Due to improved mobility and pt's preference, will change recommendation to home with 24 hr A and HHPT. Treatment Diagnosis: Impaired balance, decreased (I) with functional mobility  Specific instructions for Next Treatment: Progress ther ex and mobility as tolerated  Prognosis: Good  Decision Making: Medium Complexity  PT Education: Goals; General Safety;Gait Training;PT Role;Plan of Care;Disease Specific Education; Functional Mobility Training;Precautions;Transfer Training  Patient Education: Disease-specific education: Pt educated on role of PT in hospital and benefits of regular OOB activity to avoid complications of bedrest; pt verbalizes understanding.   REQUIRES PT FOLLOW UP: Yes  Activity Tolerance  Activity Tolerance: Patient Tolerated treatment well;Patient limited by pain Patient Diagnosis(es): The primary encounter diagnosis was Cerebrovascular accident (CVA) due to other mechanism ([address] Diagnoses of ESRD (end stage renal disease) on dialysis [facility]) and Delirium were also pertinent to this visit. has a past medical history of Ambulatory dysfunction, Aortic stenosis, Arthritis, Asthma, Bilateral hilar adenopathy syndrome, CAD (coronary artery disease), Cardiomyopathy ([address] CHF (congestive heart failure) ([address] Chronic pain, COPD (chronic obstructive pulmonary disease) ([address] Depression, Diabetes mellitus ([address] Difficult intravenous access, Emphysema of lung ([address] ESRD (end stage renal disease) on dialysis ([address] Fear of needles, Gastric ulcer, GERD (gastroesophageal reflux disease), Heart valve problem, Hemodialysis patient ([address] History of spinal fracture, Hx of blood clots, Hyperlipidemia, Hypertension, MI (myocardial infarction) ([address] Neuromuscular disorder ([address] Numbness and tingling in left arm, Pneumonia, PONV (postoperative nausea and vomiting), Prolonged emergence from general anesthesia, Sleep apnea, Stroke ([address] TIA (transient ischemic attack), and Unspecified diseases of blood and blood-forming organs. has a past surgical history that includes Tonsillectomy; cyst removal (08/14/2013); Colonoscopy; Coronary angioplasty with stent (05/26/15); vascular surgery (aprx 2 years ago); Colonoscopy (2/29/2015); Upper gastrointestinal endoscopy (01/06/2016); Upper gastrointestinal endoscopy (01/29/2017); other surgical history (02/01/2017); Dialysis fistula creation (Left, 10/30/2017); Diagnostic Cardiac Cath Lab Procedure; other surgical history (2018); other surgical history (Bilateral, 06/26/2018); pr lap insertion tunneled intraperitoneal catheter (N/A, 9/21/2018); other surgical history (Right, 09/2018); Dialysis fistula creation (Left, 3/27/2019); other surgical history (05/28/2019);  Endoscopy, colon, diagnostic; Catheter Removal (Right, 7/2/2019); and Aortic valve replacement (N/A, 10/15/2019). Restrictions  Restrictions/Precautions  Restrictions/Precautions: General Precautions, Fall Risk, Seizure  Position Activity Restriction  Other position/activity restrictions: High fall risk per nursing assessment, no BP/needle sticks in LUE, telemetry, up with assistance  Subjective   General  Chart Reviewed: Yes  Additional Pertinent Hx: Chronic LBP, HD MWF, pt reports previous heart valve surgery  Family / Caregiver Present: No  Referring Practitioner: Dr. Clarice Darby  Subjective  Subjective: Pt up in the chair. reports 10/10 back pain. RN gave pain medication  Vital Signs  Pulse: 79  BP: (!) 145/97  Oxygen Therapy  SpO2: 97 %       Orientation     Cognition      Objective   Bed mobility  Supine to Sit: Unable to assess  Sit to Supine: Unable to assess  Transfers  Sit to Stand: Contact guard assistance  Stand to sit: Contact guard assistance  Ambulation  Ambulation?: Yes  Ambulation 1  Surface: level tile  Device: Rolling Walker  Assistance: Contact guard assistance  Quality of Gait: improved cadance. Wide ALANNAH and lateral sway  Distance: 30' + 15'     Balance  Posture: Fair  Sitting - Static: Good  Sitting - Dynamic: Good  Standing - Static: Fair;+  Standing - Dynamic: Fair  Exercises  Hip Flexion: x15 B  Knee Long Arc Quad: x15 B  Ankle Pumps: x15 B      Goals  Short term goals  Time Frame for Short term goals: 1 week, 10/19/21 (unless otherwise specified)  Short term goal 1: Pt will transfer supine <-> sit with modified(I)--10/13 supervision  Short term goal 2: Pt will transfer sit <-> stand and bed>chair with CGA x 1--10/14: MET  Short term goal 3: Pt will ambulate x 75 feet using least AD with min/CGA x 1--10/14 30 ft RW with CGA  Short term goal 4: By 10/14/21: Pt will tolerate 12-15 reps BLE exercise for strengthening, balance, and endurance--10/14 MET, on going  Patient Goals   Patient goals :  \"To go home\"    Plan    Plan  Times per week: 3-5x/week in acute care  Times

## 2021-10-14 NOTE — PROGRESS NOTES
45 yo w DM, CHF, ESRD on HD, ZAINAB on CPAP, COPD with tobacco and EtOH abuse, admitted for ?subacute CVA, developed 1 episode of hematochezia last night. H/H 11/32    Plan:   1. Supportive care  2. Avoid constipation  3. Needs colonoscopy prior to D/C, but when medically stable  4. Daily H/H  5.  Will follow    Tashia Garcia MD       ([phone]) [phone]

## 2021-10-14 NOTE — PROGRESS NOTES
Rapid response called at 1155. Patient BP dropped into the 80's, reported dizziness, hands tingling and black floaters in his eyes. Trendelenburg the patient and BP increased. Gave half of 250cc bolus and his BP increased into the 150's and dizziness went away.

## 2021-10-14 NOTE — PROGRESS NOTES
Pt admitted for CVA, HD pt w/ chronic pain. Takes 7.5mg Percocet every 4-6 hours PRN . Only has   5 -325 mg in [facility]  is requesting home dose is this possible?

## 2021-10-15 VITALS
WEIGHT: 256.84 LBS | RESPIRATION RATE: 18 BRPM | HEART RATE: 98 BPM | DIASTOLIC BLOOD PRESSURE: 61 MMHG | TEMPERATURE: 97.9 F | HEIGHT: 68 IN | BODY MASS INDEX: 38.93 KG/M2 | SYSTOLIC BLOOD PRESSURE: 109 MMHG | OXYGEN SATURATION: 95 %

## 2021-10-15 LAB
ALBUMIN SERPL-MCNC: 3.3 G/DL (ref 3.4–5)
ANION GAP SERPL CALCULATED.3IONS-SCNC: 14 MMOL/L (ref 3–16)
BUN BLDV-MCNC: 43 MG/DL (ref 7–20)
CALCIUM SERPL-MCNC: 8.3 MG/DL (ref 8.3–10.6)
CHLORIDE BLD-SCNC: 96 MMOL/L (ref 99–110)
CO2: 21 MMOL/L (ref 21–32)
CREAT SERPL-MCNC: 6.8 MG/DL (ref 0.9–1.3)
GFR AFRICAN AMERICAN: 10
GFR NON-AFRICAN AMERICAN: 9
GLUCOSE BLD-MCNC: 164 MG/DL (ref 70–99)
GLUCOSE BLD-MCNC: 170 MG/DL (ref 70–99)
GLUCOSE BLD-MCNC: 197 MG/DL (ref 70–99)
HCT VFR BLD CALC: 31.9 % (ref 40.5–52.5)
HEMOGLOBIN: 10.6 G/DL (ref 13.5–17.5)
MAGNESIUM: 2 MG/DL (ref 1.8–2.4)
MCH RBC QN AUTO: 30.2 PG (ref 26–34)
MCHC RBC AUTO-ENTMCNC: 33.2 G/DL (ref 31–36)
MCV RBC AUTO: 90.8 FL (ref 80–100)
PDW BLD-RTO: 15.5 % (ref 12.4–15.4)
PERFORMED ON: ABNORMAL
PERFORMED ON: ABNORMAL
PHOSPHORUS: 7.6 MG/DL (ref 2.5–4.9)
PLATELET # BLD: 179 K/UL (ref 135–450)
PMV BLD AUTO: 8.3 FL (ref 5–10.5)
POTASSIUM SERPL-SCNC: 4.2 MMOL/L (ref 3.5–5.1)
PRO-BNP: ABNORMAL PG/ML (ref 0–124)
RBC # BLD: 3.51 M/UL (ref 4.2–5.9)
SODIUM BLD-SCNC: 131 MMOL/L (ref 136–145)
WBC # BLD: 7 K/UL (ref 4–11)

## 2021-10-15 PROCEDURE — 94640 AIRWAY INHALATION TREATMENT: CPT

## 2021-10-15 PROCEDURE — 83735 ASSAY OF MAGNESIUM: CPT

## 2021-10-15 PROCEDURE — 36415 COLL VENOUS BLD VENIPUNCTURE: CPT

## 2021-10-15 PROCEDURE — 6360000002 HC RX W HCPCS: Performed by: INTERNAL MEDICINE

## 2021-10-15 PROCEDURE — 85027 COMPLETE CBC AUTOMATED: CPT

## 2021-10-15 PROCEDURE — 94660 CPAP INITIATION&MGMT: CPT

## 2021-10-15 PROCEDURE — 94761 N-INVAS EAR/PLS OXIMETRY MLT: CPT

## 2021-10-15 PROCEDURE — 6370000000 HC RX 637 (ALT 250 FOR IP): Performed by: HOSPITALIST

## 2021-10-15 PROCEDURE — 6360000002 HC RX W HCPCS

## 2021-10-15 PROCEDURE — 80069 RENAL FUNCTION PANEL: CPT

## 2021-10-15 PROCEDURE — 2700000000 HC OXYGEN THERAPY PER DAY

## 2021-10-15 PROCEDURE — 99232 SBSQ HOSP IP/OBS MODERATE 35: CPT | Performed by: NURSE PRACTITIONER

## 2021-10-15 PROCEDURE — 2500000003 HC RX 250 WO HCPCS: Performed by: HOSPITALIST

## 2021-10-15 PROCEDURE — 6370000000 HC RX 637 (ALT 250 FOR IP): Performed by: INTERNAL MEDICINE

## 2021-10-15 PROCEDURE — 83880 ASSAY OF NATRIURETIC PEPTIDE: CPT

## 2021-10-15 PROCEDURE — 2580000003 HC RX 258: Performed by: HOSPITALIST

## 2021-10-15 PROCEDURE — 90935 HEMODIALYSIS ONE EVALUATION: CPT

## 2021-10-15 RX ORDER — HEPARIN SODIUM 1000 [USP'U]/ML
INJECTION, SOLUTION INTRAVENOUS; SUBCUTANEOUS
Status: COMPLETED
Start: 2021-10-15 | End: 2021-10-15

## 2021-10-15 RX ORDER — CARVEDILOL 6.25 MG/1
12.5 TABLET ORAL 2 TIMES DAILY
Status: DISCONTINUED | OUTPATIENT
Start: 2021-10-16 | End: 2021-10-15 | Stop reason: HOSPADM

## 2021-10-15 RX ORDER — M-VIT,TX,IRON,MINS/CALC/FOLIC 27MG-0.4MG
1 TABLET ORAL DAILY
Refills: 0 | COMMUNITY
Start: 2021-10-15 | End: 2022-02-10

## 2021-10-15 RX ORDER — OXYCODONE AND ACETAMINOPHEN 7.5; 325 MG/1; MG/1
1 TABLET ORAL EVERY 4 HOURS PRN
Status: DISCONTINUED | OUTPATIENT
Start: 2021-10-15 | End: 2021-10-15 | Stop reason: HOSPADM

## 2021-10-15 RX ORDER — OXYCODONE AND ACETAMINOPHEN 7.5; 325 MG/1; MG/1
1 TABLET ORAL EVERY 4 HOURS PRN
Qty: 10 TABLET | Refills: 0 | Status: SHIPPED | OUTPATIENT
Start: 2021-10-15 | End: 2021-10-18 | Stop reason: SDUPTHER

## 2021-10-15 RX ORDER — TORSEMIDE 100 MG/1
100 TABLET ORAL DAILY
Status: DISCONTINUED | OUTPATIENT
Start: 2021-10-16 | End: 2021-10-15 | Stop reason: HOSPADM

## 2021-10-15 RX ORDER — CLOPIDOGREL BISULFATE 75 MG/1
75 TABLET ORAL DAILY
Qty: 30 TABLET | Refills: 3 | Status: SHIPPED | OUTPATIENT
Start: 2021-10-15 | End: 2021-11-16

## 2021-10-15 RX ORDER — THIAMINE HYDROCHLORIDE 100 MG/ML
100 INJECTION, SOLUTION INTRAMUSCULAR; INTRAVENOUS DAILY
Qty: 1 EACH | Refills: 0 | Status: SHIPPED | OUTPATIENT
Start: 2021-10-15 | End: 2022-02-10

## 2021-10-15 RX ADMIN — LORAZEPAM 1 MG: 2 INJECTION INTRAMUSCULAR; INTRAVENOUS at 00:01

## 2021-10-15 RX ADMIN — HEPARIN SODIUM 3200 UNITS: 1000 INJECTION INTRAVENOUS; SUBCUTANEOUS at 15:47

## 2021-10-15 RX ADMIN — TRAZODONE HYDROCHLORIDE 150 MG: 50 TABLET ORAL at 00:01

## 2021-10-15 RX ADMIN — PRAVASTATIN SODIUM 40 MG: 40 TABLET ORAL at 00:01

## 2021-10-15 RX ADMIN — GABAPENTIN 100 MG: 100 CAPSULE ORAL at 00:00

## 2021-10-15 RX ADMIN — CALCIUM ACETATE 667 MG: 667 CAPSULE ORAL at 12:05

## 2021-10-15 RX ADMIN — QUETIAPINE FUMARATE 25 MG: 25 TABLET ORAL at 00:00

## 2021-10-15 RX ADMIN — TIOTROPIUM BROMIDE AND OLODATEROL 2 PUFF: 3.124; 2.736 SPRAY, METERED RESPIRATORY (INHALATION) at 07:52

## 2021-10-15 RX ADMIN — INSULIN GLARGINE 30 UNITS: 100 INJECTION, SOLUTION SUBCUTANEOUS at 00:01

## 2021-10-15 RX ADMIN — SODIUM CHLORIDE, PRESERVATIVE FREE 10 ML: 5 INJECTION INTRAVENOUS at 00:36

## 2021-10-15 RX ADMIN — SODIUM CHLORIDE, PRESERVATIVE FREE 10 ML: 5 INJECTION INTRAVENOUS at 08:00

## 2021-10-15 NOTE — PROGRESS NOTES
Physical Therapy  Pt sitting EOB upon entry. C/o back pain and declined participating in PT at this time. Will follow up as schedule permits. Pt to get HD later today.      Trupti Valencia, PT, DPT

## 2021-10-15 NOTE — PROGRESS NOTES
Pt's IV line removed without complications. Discussed d/c instructions with patient, given opportunity to ask questions, and provided new medication education with side effects. Follow up appointment information included in d/c instructions. Pt verbalized understanding of d/c instructions. Patient was discharged to home with all belongings and taken outside via wheelchair.     Janie Cohen RN

## 2021-10-15 NOTE — DISCHARGE INSTR - COC
Continuity of Care Form    Patient Name: Anamika Gordilol   :  1970  MRN:  [id number]    Admit date:  10/11/2021  Discharge date:  10/15/2021    Code Status Order: Full Code   Advance Directives:      Admitting Physician:  Nathalie Anthony MD  PCP: Arthuro Duane, MD    Discharging Nurse: [facility] Unit/Room#: 5927/9748-28  Discharging Unit Phone Number: [phone]    Emergency Contact:   Extended Emergency Contact Information  Primary Emergency Contact: Farida Annnifer  Address: [address] [facility], [address] [facility],[address] [facility] [address] [facility] Phone: [phone]  Mobile Phone: [phone]  Relation: Spouse    Past Surgical History:  Past Surgical History:   Procedure Laterality Date    AORTIC VALVE REPLACEMENT N/A 10/15/2019    TRANSCATHETER AORTIC VALVE REPLACEMENT FEMORAL APPROACH performed by Fay Cogan, MD at [address] [place] [address] Right 2019    PERITONEAL DIALYSIS CATHETER REMOVAL performed by Gilbert Alexis MD at [address] [place] [address]      WNL    CORONARY ANGIOPLASTY WITH STENT PLACEMENT  05/26/15    CYST REMOVAL  2013    EXCISION CYSTS, NECK X2 AND ABDOMINAL benign    DIAGNOSTIC CARDIAC CATH LAB PROCEDURE      DIALYSIS FISTULA CREATION Left 10/30/2017    LEFT BRACHIAL CEPHALIC FISTULA    DIALYSIS FISTULA CREATION Left 3/27/2019    LIGATION  AV FISTULA performed by Last Garcia MD at [address] [place] [address], [facility]      OTHER SURGICAL HISTORY  2017    laparoscopic cholecystectomy with intraoperative cholangiogram    OTHER SURGICAL HISTORY  2018    PORT PLACEMENT  - vas cath    OTHER SURGICAL HISTORY Bilateral 2018    laprascopic peritoneal dialysis catheter placement    OTHER SURGICAL HISTORY Right 2018    peritoneal dialysis port placed on right side of abdomen    OTHER SURGICAL HISTORY  2019    PTA/Stenting R External Iliac artery    NE LAP INSERTION TUNNELED INTRAPERITONEAL CATHETER N/A 9/21/2018    LAPAROSCOPIC PERITONEAL DIALYSIS CATHETER REPLACEMENT performed by Nathan Frazier MD at [facility] ENDOSCOPY  01/06/2016    UPPER GASTROINTESTINAL ENDOSCOPY  01/29/2017    possible candida, otherwise normal appearing    VASCULAR SURGERY  aprx 2 years ago    2 stents placed, each side of groin       Immunization History:   Immunization History   Administered Date(s) Administered    COVID-19, Moderna, PF, 100mcg/0.5mL 03/26/2021, 04/23/2021    Hepatitis B Adult (Engerix-B) 11/18/2017, 06/13/2018, 07/13/2018    Influenza Virus Vaccine 12/27/2012, 10/07/2014, 11/20/2015, 10/16/2019    Influenza, Intradermal, Quadrivalent, Preservative Free 11/16/2016    Influenza, MDCK Quadv, IM, PF (Flucelvax 2 yrs and older) 10/14/2017, 09/30/2020, 10/05/2021    Influenza, [place], 6 mo and older, IM, PF (Flulaval, Fluarix) 09/15/2018    Influenza, Quadv, IM, PF (6 mo and older Fluzone, Flulaval, Fluarix, and 3 yrs and older Afluria) 10/16/2019    PPD Test 11/09/2017, 11/16/2017, 01/03/2019, 01/06/2020, 01/15/2021    Pneumococcal Conjugate 13-valent ([id number]) 10/14/2017    Pneumococcal Polysaccharide ([id number]) 10/07/2014, 11/19/2019    Tdap (Boostrix, Adacel) 02/13/2020    Zoster Recombinant (Shingrix) 02/13/2020       Active Problems:  Patient Active Problem List   Diagnosis Code    Acute respiratory failure with hypoxia and hypercapnia ([facility]) J96.01, J96.02    Chronic obstructive pulmonary disease ([facility]) J44.9    CAD S/P percutaneous coronary angioplasty I25.10, Z98.61    PVD (peripheral vascular disease) ([facility]) I73.9    Nonischemic cardiomyopathy ([facility]) I42.8    Sleep apnea G47.30    Bicuspid aortic valve Q23.1    Bilateral hilar adenopathy syndrome R59.0    Claudication in peripheral vascular disease ([facility]) I73.9    Essential hypertension I10    Diabetic neuropathy ([facility]) E11.40    Type 2 diabetes, uncontrolled, with neuropathy ([facility] E11.40, E11.65    Passive smoke exposure Z77.22    Depression with anxiety F41.8    Hematoma T14. 8XXA    Pneumonia of right upper lobe due to infectious organism J18.9    DM (diabetes mellitus), secondary, uncontrolled, w/neurologic complic ([facility] B32.98, T40.19    Hypertensive heart disease with congestive heart failure with preserved left ventricular function (HCC) I11.0, I50.30    CHF exacerbation ([facility]) I50.9    Chest pain R07.9    Coronary artery disease involving native coronary artery of native heart without angina pectoris I25.10    Obesity (BMI 30-39. 9) E66.9    ZAINAB on CPAP G47.33, Z99.89    Degeneration of lumbar or lumbosacral intervertebral disc M51.37    Lumbar radiculopathy M54.16    Lumbosacral spondylosis without myelopathy G04.529    Biliary colic P08.54    Symptomatic cholelithiasis K80.20    Gastroparesis due to DM ([facility]) E11.43, K31.84    Angina, class IV ([facility]) I20.9    Dyspnea R06.00    Elevated brain natriuretic peptide (BNP) level R79.89    Dyslipidemia E78.5    Respiratory distress R06.03    Hypoxia R09.02    Chest pressure R07.89    Hypertensive urgency I16.0    Acute on chronic combined systolic and diastolic heart failure ([facility]) I50.43    Ischemic cardiomyopathy I25.5    Chronic renal failure, stage 5 ([facility]) N18.5    Tobacco abuse Z72.0    CKD stage 4 due to type 2 diabetes mellitus (HCC) E11.22, N18.4    CVA (cerebral vascular accident) ([facility] I63.9    Arterial ischemic stroke, ICA, right, acute ([facility]) I63.231    Type 2 diabetes mellitus without complication, without long-term current use of insulin ([facility]) E11.9    HTN (hypertension), benign I10    ZAINAB (obstructive sleep apnea) G47.33    Diarrhea R19.7    Pleural effusion J90    Chronic anemia D64.9    Nonrheumatic aortic valve stenosis I35.0    Hypervolemia E87.70    Hyperkalemia E87.5    Obesity E66.9    Mucus plugging of bronchi T17.500A    Hemodialysis-associated hypotension I95.3    Left arm pain M79.602    Dizziness R42    ESRD (end stage renal disease) on dialysis ([facility]) N18.6, Z99.2    Hypotension due to drugs I95.2    Acute diastolic CHF (congestive heart failure) ([facility]) I50.31    Neuromuscular disorder ([facility]) G70.9    Acute combined systolic and diastolic CHF, NYHA class 4 ([facility]) I50.41    Renovascular hypertension I15.0    Mixed hyperlipidemia E78.2    Cigarette nicotine dependence in remission F17.211    Acute respiratory failure ([facility]) J96.00    Pulmonary edema J81.1    Fluid overload U56.84    Diastolic dysfunction W03.57    Anemia of chronic disease D63.8    SOB (shortness of breath) R06.02    Steal syndrome of dialysis vascular access ([facility]) T82.898A    Chronic, continuous use of opioids F11.90    Chronic bronchitis ([facility]) J42    Nasal congestion R09.81    Hypercholesteremia E78.00    Bradycardia R00.1    S/P TAVR (transcatheter aortic valve replacement) Z95.2    Syncope and collapse R55    Atrial fibrillation ([facility]) I48.91    Former smoker Z87.891    Generalized weakness R53.1    DKA, type 1, not at goal [facility]) E10.10    Bilateral leg weakness R29.898    GBS (Guillain-Dayton syndrome) ([facility]) G61.0    Sinus pause I45.5    Weakness of both lower extremities R29.898    Sinus bradycardia R00.1    Ataxia R27.0    Peripheral vascular occlusive disease ([facility]) I73.9    Cellulitis of right foot L03.115    Iliac artery occlusion, right ([facility]) I74.5    Cellulitis and abscess of hand L03.119, L02.519    Type 2 diabetes mellitus with hyperglycemia ([facility]) E11.65    Acute encephalopathy G93.40    Acute hypoxemic respiratory failure ([facility]) J96.01    CHF (congestive heart failure) ([facility]) I50.9    Acute cerebrovascular accident (CVA) ([address] I63.9    Speech problem R47.9    Left face and left arm tingling R20.2    Urinary tract infection with hematuria N39.0, R31.9    Acute CVA (cerebrovascular accident) ([address] I63.9    Alcohol abuse, daily use F10.10       Isolation/Infection:   Isolation            No Isolation          Patient Infection Status       Infection Onset Added Last Indicated Last Indicated By Review Planned Expiration Resolved Resolved By    None active    Resolved    COVID-19 Rule Out 08/29/21 08/29/21 08/29/21 COVID-19 (Ordered)   08/29/21 Rule-Out Test Resulted    COVID-19 Rule Out 12/18/20 12/18/20 12/18/20 COVID-19 (Ordered)   12/18/20 Rule-Out Test Resulted    COVID-19 Rule Out 05/04/20 05/04/20 05/04/20 COVID-19 (Ordered)   05/04/20 Rule-Out Test Resulted            Nurse Assessment:  Last Vital Signs: /70   Pulse 72   Temp 97.6 °F (36.4 °C) (Axillary)   Resp 18   Ht 5' 8\" (1.727 m)   Wt 263 lb 0.1 oz (119.3 kg)   SpO2 95%   BMI 39.99 kg/m²     Last documented pain score (0-10 scale): Pain Level: 8  Last Weight:   Wt Readings from Last 1 Encounters:   10/15/21 263 lb 0.1 oz (119.3 kg)     Mental Status:  oriented and alert    IV Access:  - None    Nursing Mobility/ADLs:  Walking   Independent  Transfer  Independent  Bathing  Independent  Dressing  Independent  Toileting  Independent  Feeding  Independent  Med [address] [place]  Med Delivery   whole    Wound Care Documentation and Therapy:  Wound 08/30/21 Toe (Comment  which one) Right Great Toe (Active)   Number of days: 45        Elimination:  Continence:   · Bowel: No  · Bladder: No  Urinary Catheter: None   Colostomy/Ileostomy/Ileal Conduit: No       Date of Last BM: ***    Intake/Output Summary (Last 24 hours) at 10/15/2021 1206  Last data filed at 10/14/2021 2230  Gross per 24 hour   Intake 2160 ml   Output --   Net 2160 ml     I/O last 3 completed shifts: In: 2520 [P.O.:2520]  Out: -     Safety Concerns:     None    Impairments/Disabilities:      None    Nutrition Therapy:  Current Nutrition Therapy:   - Oral Diet:  General    Routes of Feeding: Oral  Liquids:  Thin Liquids  Daily Fluid Restriction: no  Last Modified Barium Swallow with Video (Video Swallowing Test): not done    Treatments at the Time of Hospital Discharge:   Respiratory Treatments: ***  Oxygen Therapy:  is not on home oxygen therapy. Ventilator:    - No ventilator support    Rehab Therapies: Physical Therapy and Occupational Therapy  Weight Bearing Status/Restrictions: No weight bearing restirctions  Other Medical Equipment (for information only, NOT a DME order):  ***  Other Treatments: ***    Patient's personal belongings (please select all that are sent with patient):  None    RN SIGNATURE:  Electronically signed by Yaima Melo RN on 10/15/21 at 3:51 PM EDT    CASE MANAGEMENT/SOCIAL WORK SECTION    Inpatient Status Date: ***    Readmission Risk Assessment Score:  Readmission Risk              Risk of Unplanned Readmission:  82           Discharging to Facility/ Agency   · Name:   · Address:  · Phone:  · Fax:    Dialysis Facility (if applicable)   · Name:  · Address:  · Dialysis Schedule:  · Phone:  · Fax:    / signature: {Esignature:[id number]:::0}    PHYSICIAN SECTION    Prognosis: Guarded    Condition at Discharge: Stable    Rehab Potential (if transferring to Rehab): {Prognosis:[id number]:::0}    Recommended Labs or Other Treatments After Discharge:   Resume home care, monitor closely BP as meds have been DC and may need to be resumed     Physician Certification: I certify the above information and transfer of Yusef Parra  is necessary for the continuing treatment of the diagnosis listed and that he requires [address] for greater 30 days.      Update Admission H&P: No change in H&P    PHYSICIAN SIGNATURE:  {Esignature:[id number]:::0}

## 2021-10-15 NOTE — PROGRESS NOTES
Progress Note  Date:10/14/2021       Room:0532/0532-01  Patient Name:Igor Brown     YOB: 1970     Age:51 y.o. Subjective    Subjective:  Symptoms:  Stable. Pain:  He reports no pain. Review of Systems  Objective         Vitals Last 24 Hours:  TEMPERATURE:  Temp  Av.4 °F (36.3 °C)  Min: 96.4 °F (35.8 °C)  Max: 98 °F (36.7 °C)  RESPIRATIONS RANGE: Resp  Av.7  Min: 16  Max: 20  PULSE OXIMETRY RANGE: SpO2  Av %  Min: 93 %  Max: 97 %  PULSE RANGE: Pulse  Av.2  Min: 69  Max: 81  BLOOD PRESSURE RANGE: Systolic (46BDO), NZC:504 , Min:67 , REM:298   ; Diastolic (97UXA), HLT:28, Min:25, Max:97    I/O (24Hr): Intake/Output Summary (Last 24 hours) at 10/14/2021 2110  Last data filed at 10/14/2021 1717  Gross per 24 hour   Intake 1440 ml   Output --   Net 1440 ml     Objective:  General Appearance:  Not in pain. Vital signs: (most recent): Blood pressure (!) 90/54, pulse 76, temperature 98 °F (36.7 °C), temperature source Oral, resp. rate 17, height 5' 8\" (1.727 m), weight 251 lb 5.2 oz (114 kg), SpO2 93 %. Abdomen: Abdomen is soft. Bowel sounds are normal.   There is no abdominal tenderness. Labs/Imaging/Diagnostics    Labs:  CBC:  Recent Labs     10/12/21  0607 10/12/21  0607 10/12/21  2120 10/13/21  0656 10/14/21  0632   WBC 18.5*  --   --  12.1* 8.6   RBC 3.87*  --   --  3.59* 3.73*   HGB 11.3*   < > 11.1* 10.6* 11.1*   HCT 35.1*   < > 33.9* 32.2* 33.6*   MCV 90.8  --   --  89.7 90.2   RDW 15.7*  --   --  15.6* 15.5*     --   --  220 194    < > = values in this interval not displayed. CHEMISTRIES:  Recent Labs     10/12/21  0608 10/13/21  0656 10/14/21  0645    140 138   K 5.1 3.9 4.0    103 101   CO2 22 20* 22   BUN 79* 67* 37*   CREATININE 8.1* 7.2* 5.7*   GLUCOSE 78 52* 73   PHOS  --  7.2*  --    MG  --  2.20 2.00     PT/INR:No results for input(s): PROTIME, INR in the last 72 hours. APTT:No results for input(s):  APTT in the last 72 hours. LIVER PROFILE:  Recent Labs     10/12/21  0608   AST 9*   ALT 9*   BILITOT <0.2   ALKPHOS 100       Imaging Last 24 Hours:  MRI brain without contrast    Result Date: 10/13/2021  EXAMINATION: MRI OF THE BRAIN WITHOUT CONTRAST  10/13/2021 11:20 am TECHNIQUE: Multiplanar multisequence MRI of the brain was performed without the administration of intravenous contrast. COMPARISON: 09/09/2021. HISTORY: ORDERING SYSTEM PROVIDED HISTORY: new L thalamic CVA TECHNOLOGIST PROVIDED HISTORY: Reason for exam:->new L thalamic CVA Reason for Exam: SLURRING SPEECH, POSSIBLE CVA Acuity: Acute Type of Exam: Initial FINDINGS: Motion degrades images limiting evaluation. INTRACRANIAL STRUCTURES/VENTRICLES: There is a small acute to subacute infarct involving the left frontal lobe periventricular white matter extending along the left thalamus. A chronic lacunar infarct is seen in the right thalamus. No mass effect or midline shift. No evidence of an acute intracranial hemorrhage. There is a focus of susceptibility within the right thalamus, which likely represent sequelae of a prior microhemorrhage. Areas of T2 FLAIR hyperintensity are seen in the periventricular and subcortical white matter, which are nonspecific, but may represent chronic microvascular ischemic change. There is mild global parenchymal volume loss. Chronic lacunar infarcts within the cerebellar hemispheres bilaterally. The sellar/suprasellar regions appear unremarkable. The normal signal voids within the major intracranial vessels appear maintained. ORBITS: The visualized portion of the orbits demonstrate no acute abnormality. SINUSES: The visualized paranasal sinuses and mastoid air cells are well aerated. BONES/SOFT TISSUES: The bone marrow signal intensity appears normal. The soft tissues demonstrate no acute abnormality. 1. Patient motion limits evaluation.  2. Small acute to subacute infarct involving the left frontal lobe periventricular white matter extending along the left thalamus. No mass effect or midline shift. 3. Otherwise, no acute intracranial abnormality. 4. Chronic lacunar infarcts involving the right thalamus and bilateral cerebellar hemispheres. 5. Mild global parenchymal volume loss with chronic microvascular ischemic changes. These results were sent to the Results [address] ([facility]) on 10/13/2021 at 11:55 am to be communicated to the referring/covering health care provider/office. Assessment//Plan           Hospital Problems         Last Modified POA    * (Principal) Acute encephalopathy 10/12/2021 Yes    CAD S/P percutaneous coronary angioplasty 10/11/2021 Yes    Chronic obstructive pulmonary disease (HCC) (Chronic) 10/11/2021 Yes    DM (diabetes mellitus), secondary, uncontrolled, w/neurologic complic ([address] 25/98/7457 Yes    ZAINAB (obstructive sleep apnea) 10/11/2021 Yes    ESRD (end stage renal disease) on dialysis ([address] 10/11/2021 Yes    Acute combined systolic and diastolic CHF, NYHA class 4 ([address] 10/11/2021 Yes    Acute cerebrovascular accident (CVA) ([address] 10/12/2021 Yes    Overview Signed 10/11/2021  6:00 PM by Champ Garcia MD     10/11/2021 Acute L anterior thalamic(new from previous study)         Acute CVA (cerebrovascular accident) ([address] 10/11/2021 Yes    Alcohol abuse, daily use 10/11/2021 Yes        Assessment & Plan  47 yo w DM, CHF, ESRD on HD, ZAINAB on CPAP, COPD with tobacco and EtOH abuse, admitted for ?subacute CVA, developed 1 episode of hematochezia the night of 10/12. H/H 11/32    Plan:   1. Supportive care  2. Avoid constipation  3. Needs colonoscopy prior to D/C, but when medically stable, so possibly on Monday   4. Daily H/H  5.  Will follow    Maria C Thorpe MD       (O) [phone]        Electronically signed by Maria C Thorpe MD on 10/14/21 at 9:10 PM EDT

## 2021-10-15 NOTE — PROGRESS NOTES
[facility] Progress Note           Subjective/   46y.o. year old male who we are seeing in consultation for ESRD. HPI:  Last HD on 10/13 with 3 liters removed, post-weight of 116 kg. Hypotensive yesterday, received IVF bolus, BP meds held. +weak. Mentation better. ROS:  Denies any shortness of breath. Intake adequate. Objective/   GEN:  Chronically ill, /70   Pulse 72   Temp 97.6 °F (36.4 °C) (Axillary)   Resp 18   Ht 5' 8\" (1.727 m)   Wt 263 lb 0.1 oz (119.3 kg)   SpO2 97%   BMI 39.99 kg/m²   HEENT: non-icteric, no JVD  CV: S1, S2 without m/r/g; + LE edema  RESP: CTA B without w/r/r; breathing wnl  ABD: +bs, soft, nt, no hsm  SKIN: warm, no rashes  ACCESS: Left IJ [facility]    Data/  Recent Labs     10/13/21  0656 10/14/21  0632 10/15/21  0518   WBC 12.1* 8.6 7.0   HGB 10.6* 11.1* 10.6*   HCT 32.2* 33.6* 31.9*   MCV 89.7 90.2 90.8    194 179     Recent Labs     10/13/21  0656 10/14/21  0645 10/15/21  0518    138 131*   K 3.9 4.0 4.2    101 96*   CO2 20* 22 21   GLUCOSE 52* 73 197*   PHOS 7.2*  --  7.6*   MG 2.20 2.00 2.00   BUN 67* 37* 43*   CREATININE 7.2* 5.7* 6.8*   LABGLOM 8* 10* 9*   GFRAA 10* 13* 10*       Assessment/     -ESRD on HD MWF     -Recent CVA, now new CVA/Acute metabolic encephalopathy - plans per Neurology     -Hypertension - high on admission, now within range     -Anemia of chronic disease - Hb 10.6     -Leukocytosis - resolved       Plan/     - HD today per schedule with UF as tolerated with prn albumin and crit line monitoring     kg, lowered now at 116 kg - lower as tolerated  - Hold BP meds today - resume coreg and demadex in AM  - Stop imdur and losartan with recent hypotension  - Holding on any DAVON dosing   - Trend labs, bp's, blood cultures ntd     ____________________________________  Chance Springer MD  [facility]  [email or web address]  Office: [phone]

## 2021-10-15 NOTE — PROGRESS NOTES
10/15/21 0435   NIV Type   NIV Started/Stopped On   Equipment Type v60   Mode Bilevel   Mask Type Full face mask   Mask Size Large   Settings/Measurements   IPAP 14 cmH20   CPAP/EPAP 6 cmH2O   Rate Ordered 16   Resp 18   Insp Rise Time (%) 1 %   FiO2  30 %   I Time/ I Time % 0.9 s   Vt Exhaled 355 mL   Minute Volume 6.4 Liters   Comfort Level Good   Using Accessory Muscles No   SpO2 96   Alarm Settings   Alarms On Y   Press Low Alarm 6 cmH2O   High Pressure Alarm 30 cmH2O   Delay Alarm 20 sec(s)   Resp Rate Low Alarm 6   High Respiratory Rate 40 br/min

## 2021-10-15 NOTE — DISCHARGE SUMMARY
Hospital Medicine Discharge Summary    Patient ID: Abby Gifford      Patient's PCP: Amy Thomas MD    Admit Date: 10/11/2021     Discharge Date:   10/15/21    Admitting Physician: Satish Patel MD     Discharge Physician: Michelet Morales APRN - CNP     Discharge Diagnoses: Active Hospital Problems    Diagnosis     CAD S/P percutaneous coronary angioplasty [I25.10, Z98.61]      Priority: High    Chronic obstructive pulmonary disease (HCC) [J44.9]      Priority: Medium    Acute CVA (cerebrovascular accident) ([facility] [address] [I63.9]     Alcohol abuse, daily use [F10.10]     Acute cerebrovascular accident (CVA) ([facility] [address] [I63.9]     Acute encephalopathy [G93.40]     Acute combined systolic and diastolic CHF, NYHA class 4 ([facility] [address] [I50.41]     ESRD (end stage renal disease) on dialysis ([facility] [address] [N18.6, Z99.2]     ZAINAB (obstructive sleep apnea) [G47.33]     DM (diabetes mellitus), secondary, uncontrolled, w/neurologic complic ([facility] [address] [G69.99, W92.30]        The patient was seen and examined on day of discharge and this discharge summary is in conjunction with any daily progress note from day of discharge. Hospital Course:   CVA - subacute, thromboembolic likely. MRI confirmed L frontal lobe. Neurology consulted and appreciated. Continue ASA/Statin for 2nd prevention and risk reduction.      HTN/CAD - w/ known CAD but no evidence of active signs/sxs of ischemia/failure. Currently controlled on home meds w/ vitals reviewed and documented as above. Cardiology consulted and appreciated.      HyperLipidemia - controlled on home Statin. Continue, w/ f/u and med adjustment w/ PCP     CHF - acute on chronic combined sytolic/diastolic failure w/ reduced EF 40-45% by Echo Sept 2021 w/ Grade 2 diastolic dysfunction. Likely due to hypertensive heart disease. Continue current medical management and follow I/O as well as clinical response. .      ESRD - on HD M/W/F.  Patient non-compliant and arrived in volume overload. Nephrology consulted and appreciated s/p inpatient HD     Encephalopathy - acute metabolic, 2nd to above. Will continue to follow clinical response w/ supportive care PRN.      COPD - w/out chronic respiratory failure on no baseline home O2. Controlled on home medication regimen - continued.      DM2 - controlled on home oral antiGlycemics/Insulin - held. Started on weight based Lantus - titrated down 14 October and follow FSBS/SSI medium regimen. Last HbA1c 9.3% dated this admission. resuming/continuing home regimen at discharge.      Morbid Obesity -  With Body mass index is 38.21 kg/m². Complicating assessment and treatment. Placing patient at risk for multiple co-morbidities as well as early death and contributing to the patient's presentation. Counseled on weight loss.      ZAINAB - likely 2nd to obesity. Controlled on home CPAP/BiPap - continued, w/ outpatient f/u as previously arranged.      Alcohol Abuse - presumed active and ongoing w/ dependence but no signs/sxs of w/drawal - patient denies. Cessation counseled.                Labs: For convenience and continuity at follow-up the following most recent labs are provided:      CBC:    Lab Results   Component Value Date    WBC 7.0 10/15/2021    HGB 10.6 10/15/2021    HCT 31.9 10/15/2021     10/15/2021       Renal:    Lab Results   Component Value Date     10/15/2021    K 4.2 10/15/2021    K 5.1 10/12/2021    CL 96 10/15/2021    CO2 21 10/15/2021    BUN 43 10/15/2021    CREATININE 6.8 10/15/2021    CALCIUM 8.3 10/15/2021    PHOS 7.6 10/15/2021         Significant Diagnostic Studies    Radiology:   MRI brain without contrast   Final Result   1. Patient motion limits evaluation. 2. Small acute to subacute infarct involving the left frontal lobe   periventricular white matter extending along the left thalamus. No mass   effect or midline shift. 3. Otherwise, no acute intracranial abnormality.    4. Chronic lacunar infarcts involving the right thalamus and bilateral   cerebellar hemispheres. 5. Mild global parenchymal volume loss with chronic microvascular ischemic   changes. These results were sent to the [facility] [id number] ([address]) on   10/13/2021 at 11:55 am to be communicated to the referring/covering health   care provider/office. CT HEAD WO CONTRAST   Final Result   Small nonhemorrhagic lacunar infarction, anterior left thalamus new from the   prior study. This is likely recent given the density. Multiple old lacunar infarctions in the basal ganglia unchanged. XR CHEST PORTABLE   Final Result   Mild cardiomegaly and vascular congestion. Consults:     IP CONSULT TO HOSPITALIST  IP CONSULT TO NEPHROLOGY  IP CONSULT TO NEUROLOGY  IP CONSULT TO HEART FAILURE NURSE/COORDINATOR  IP CONSULT TO DIETITIAN  IP CONSULT TO GI    Disposition:  SNF    Condition at Discharge: Stable    Discharge Instructions/Follow-up:      Code Status:  Full Code    Activity: activity as tolerated    Diet: diabetic diet      Discharge Medications:     Current Discharge Medication List           Details   clopidogrel (PLAVIX) 75 MG tablet Take 1 tablet by mouth daily  Qty: 30 tablet, Refills: 3      thiamine (B-1) 100 MG/ML injection Infuse 1 mL intravenously daily  Qty: 1 each, Refills: 0      Multiple Vitamins-Minerals (THERAPEUTIC MULTIVITAMIN-MINERALS) tablet Take 1 tablet by mouth daily  Refills: 0              Details   oxyCODONE-acetaminophen (PERCOCET) 7.5-325 MG per tablet Take 1 tablet by mouth every 4 hours as needed (pain) for up to 3 days.   Qty: 10 tablet, Refills: 0    Comments: Reduce doses taken as pain becomes manageable  Associated Diagnoses: Chronic pain syndrome              Details   Continuous Blood Gluc Sensor (DEXCOM G6 SENSOR) MISC Every 10 days  Qty: 9 each, Refills: 3    Associated Diagnoses: DM (diabetes mellitus), secondary, uncontrolled, w/neurologic complic (HCC)      Continuous Blood Gluc Transmit (DEXCOM G6 TRANSMITTER) MISC 1 each by Does not apply route every 3 months  Qty: 1 each, Refills: 3    Associated Diagnoses: DM (diabetes mellitus), secondary, uncontrolled, w/neurologic complic (HCC)      Continuous Blood Gluc  (DEXCOM G6 ) ADAM 1 each by Does not apply route Daily with lunch  Qty: 1 each, Refills: 0    Associated Diagnoses: DM (diabetes mellitus), secondary, uncontrolled, w/neurologic complic (HCC)      DULoxetine (CYMBALTA) 60 MG extended release capsule TAKE 1 CAPSULE BY MOUTH EVERY DAY  Qty: 30 capsule, Refills: 5    Associated Diagnoses: Depression, unspecified depression type      carvedilol (COREG) 12.5 MG tablet TAKE 1 TABLET BY MOUTH TWICE DAILY WITH MEALS  Qty: 60 tablet, Refills: 10      traZODone (DESYREL) 150 MG tablet TAKE ONE (1) TABLET BY MOUTH NIGHTLY  Qty: 30 tablet, Refills: 10      pravastatin (PRAVACHOL) 40 MG tablet TAKE 1 TABLET BY MOUTH EACH EVENING  Qty: 30 tablet, Refills: 10      B Complex-C-Folic Acid (VIRT-CAPS) 1 MG CAPS TAKE 1 CAPSULE BY MOUTH EVERY DAY  Qty: 90 capsule, Refills: 1      torsemide (DEMADEX) 100 MG tablet Take 1 tablet by mouth daily  Qty: 30 tablet, Refills: 3      pantoprazole (PROTONIX) 40 MG tablet TAKE (1) TABLET BY MOUTH EACH MORNING BEFORE BREAKFAST  Qty: 30 tablet, Refills: 1    Associated Diagnoses: Gastroesophageal reflux disease without esophagitis      Calcium Acetate, Phos Binder, 667 MG CAPS TAKE 1 CAPSULE BY MOUTH THREE TIMES DAILY WITH MEALS  Qty: 90 capsule, Refills: 3      QUEtiapine (SEROQUEL) 50 MG tablet TAKE (1) TABLET BY MOUTH IN THE EVENING  Qty: 30 tablet, Refills: 2    Associated Diagnoses: Insomnia, unspecified type; Mood disorder ([facility])      BASAGLAR KWIKPEN 100 UNIT/ML injection pen ADMINISTER 60 UNITS UNDER THE SKIN EVERY NIGHT  Qty: 15 mL, Refills: 5    Associated Diagnoses: Type 2 diabetes mellitus with diabetic nephropathy, with long-term current use of insulin ([facility])      albuterol (PROVENTIL) (2.5 MG/3ML) 0.083% nebulizer solution INHALE 1 VIAL VIA NEBULIZER EVERY 6 HOURS AS NEEDED FOR WHEEZING  Qty: 300 mL, Refills: 5      famotidine (PEPCID) 20 MG tablet TAKE 1 TABLET BY MOUTH TWICE DAILY AS NEEDED FOR HEARTBURN  Qty: 180 tablet, Refills: 0    Comments: **Patient requests 90 days supply**  Associated Diagnoses: Peripheral polyneuropathy      gabapentin (NEURONTIN) 100 MG capsule TAKE 1 TO 2 CAPSULES BY MOUTH THREE TIMES A DAY  Qty: 180 capsule, Refills: 5    Associated Diagnoses: Chronic pain syndrome      LINZESS 145 MCG capsule TAKE 1 CAPSULE BY MOUTH EVERY MORNING BEFORE BREAKFAST  Qty: 30 capsule, Refills: 10    Associated Diagnoses: Chronic idiopathic constipation      cyclobenzaprine (FLEXERIL) 10 MG tablet TAKE 1 TABLET BY MOUTH EVERY 8 HOURS AS NEEDED  Qty: 90 tablet, Refills: 5      tiZANidine (ZANAFLEX) 4 MG tablet TAKE 1 TABLET BY MOUTH THREE TIMES DAILY  Qty: 90 tablet, Refills: 10      simethicone (MYLICON) 80 MG chewable tablet Take 1 tablet by mouth every 6 hours as needed (cramping)  Qty: 15 tablet, Refills: 0      !! glucose monitoring kit (FREESTYLE) monitoring kit 1 kit by Does not apply route daily  Qty: 1 kit, Refills: 0    Comments: Which ever is coverd. !! blood glucose test strips (GLUCOSE METER TEST) strip 1 each by In Vitro route 5 times daily As needed. Qty: 100 each, Refills: 3      nitroGLYCERIN (NITROSTAT) 0.4 MG SL tablet DISSOLVE 1 TABLET UNDER THE TONGUE AS NEEDED FOR CHEST PAIN EVERY 5 MINUTES UP TO 3 TIMES. IF NO RELIEF CALL 911.   Qty: 25 tablet, Refills: 10    Associated Diagnoses: Coronary artery disease involving native heart without angina pectoris, unspecified vessel or lesion type      insulin aspart (NOVOLOG FLEXPEN) 100 UNIT/ML injection pen Inject 20 Units into the skin 3 times daily (before meals)  Qty: 15 pen, Refills: 5    Associated Diagnoses: Type 2 diabetes mellitus with diabetic nephropathy, with long-term current use of insulin ([facility] [address] you have any questions or concerns please feel free to contact me at [phone].

## 2021-10-15 NOTE — CARE COORDINATION
CASE MANAGEMENT DISCHARGE SUMMARY      Discharge to: Home     New Durable Medical Equipment ordered/agency: [address]     Confirmed discharge plan with:     Patient: no - In dialysis      Family:  Yes Wife Risa Rehman via phone      Facility/Agency, name:  [facility] faxed     RN, name:  Edda Patterson RN

## 2021-10-15 NOTE — PROGRESS NOTES
Treatment time: 180  Net UF: 1 liter    Pre weight: 118  Post weight:116.5  EDW: 118    Access used: L CVC   Access function: worked great    Medications or blood products given: nishant    Regular outpatient schedule: MWF    Summary of response to treatment: removed 1 liter with out  issue vvs   pt being dcd after tx    Copy of dialysis treatment record placed in chart, to be scanned into EMR.

## 2021-10-15 NOTE — CARE COORDINATION
CM continues to follow for needs at DC. Pt has been accepted by [facility] for services at DC. Pt does have HD MWF at [facility].  They will need to be notified upon pt DC from hospital.     Ganesh Stringer RN

## 2021-10-15 NOTE — PROGRESS NOTES
10/15/21 0228   NIV Type   NIV Started/Stopped On   Equipment Type v60   Mode Bilevel   Mask Type Full face mask   Mask Size Large   Settings/Measurements   IPAP 14 cmH20   CPAP/EPAP 6 cmH2O   Rate Ordered 16   Resp 18   Insp Rise Time (%) 1 %   FiO2  30 %   I Time/ I Time % 0.9 s   Vt Exhaled 905 mL   Minute Volume 15.9 Liters   Comfort Level Good   Using Accessory Muscles No   SpO2 97   Alarm Settings   Alarms On Y   Press Low Alarm 6 cmH2O   High Pressure Alarm 30 cmH2O   Delay Alarm 20 sec(s)   Resp Rate Low Alarm 6   High Respiratory Rate 40 br/min

## 2021-10-15 NOTE — PROGRESS NOTES
medication than most people    Sleep apnea     Uses CPAP    Stroke ([facility] [address]     7mm thalamic cva 2017 deficts left side, left side weakness    TIA (transient ischemic attack)     Unspecified diseases of blood and blood-forming organs      Current Facility-Administered Medications   Medication Dose Route Frequency Provider Last Rate Last Admin    [START ON 10/16/2021] carvedilol (COREG) tablet 12.5 mg  12.5 mg Oral BID Adi Faustin MD        [START ON 10/16/2021] torsemide (DEMADEX) tablet 100 mg  100 mg Oral Daily Adi Faustin MD        insulin glargine (LANTUS) injection vial 30 Units  30 Units SubCUTAneous Nightly Vj Bartlett MD   30 Units at 10/15/21 0001    clopidogrel (PLAVIX) tablet 75 mg  75 mg Oral Daily Gerrianne Daily, APRN - CNP        LORazepam (ATIVAN) injection 1 mg  1 mg IntraVENous Q4H PRN Vj Bartlett MD   1 mg at 10/15/21 0001    [Held by provider] cyclobenzaprine (FLEXERIL) tablet 10 mg  10 mg Oral TID PRN Heather Yo MD        linaclotide [facility]) capsule 145 mcg  145 mcg Oral QAM AC Heather Yo MD        traZODone (DESYREL) tablet 150 mg  150 mg Oral Nightly Heather Yo MD   150 mg at 10/15/21 0001    Calcium Acetate (Phos Binder) CAPS 667 mg  667 mg Oral TID  Heather Yo MD   667 mg at 10/14/21 1744    DULoxetine (CYMBALTA) extended release capsule 60 mg  60 mg Oral Daily Heather Yo MD   60 mg at 10/14/21 0845    gabapentin (NEURONTIN) capsule 100 mg  100 mg Oral TID Heather Yo MD   100 mg at 10/15/21 0000    pantoprazole (PROTONIX) tablet 40 mg  40 mg Oral QAM AC Heather Yo MD   40 mg at 10/14/21 0848    pravastatin (PRAVACHOL) tablet 40 mg  40 mg Oral Nightly Heather Yo MD   40 mg at 10/15/21 0001    tiotropium-olodaterol (STIOLTO) 2.5-2.5 MCG/ACT inhaler 2 puff  2 puff Inhalation Daily Heather Yo MD   2 puff at 10/15/21 0752    QUEtiapine (SEROQUEL) tablet 25 mg  25 mg Oral Nightly Heather Yo MD   25 mg at 10/15/21 0000    glucose (GLUTOSE) 40 % oral gel 15 g  15 g Oral PRN Orly Parada MD   15 g at 10/13/21 0853    dextrose 50 % IV solution  12.5 g IntraVENous PRN Orly Parada MD   12.5 g at 10/14/21 0554    glucagon (rDNA) injection 1 mg  1 mg IntraMUSCular PRN Orly Parada MD        dextrose 5 % solution  100 mL/hr IntraVENous PRN Orly Parada MD   Stopped at 10/14/21 1213    sodium chloride flush 0.9 % injection 10 mL  10 mL IntraVENous 2 times per day Orly Parada MD   10 mL at 10/15/21 0036    sodium chloride flush 0.9 % injection 10 mL  10 mL IntraVENous PRN Orly Parada MD        0.9 % sodium chloride infusion  25 mL IntraVENous PRN Orly Parada MD        promethazine (PHENERGAN) tablet 12.5 mg  12.5 mg Oral Q6H PRN Orly Parada MD        Or    ond[facility]etron [facility]) injection 4 mg  4 mg IntraVENous Q6H PRN Orly Parada MD   4 mg at 10/13/21 2123    senna (SENOKOT) tablet 8.6 mg  1 tablet Oral Daily PRN Orly Parada MD        bisacodyl (DULCOLAX) suppository 10 mg  10 mg Rectal Daily PRN Orly Parada MD        labetalol (NORMODYNE;TRANDATE) injection 10 mg  10 mg IntraVENous Q10 Min PRN Orly Parada MD        insulin lispro (HUMALOG) injection vial 6 Units  0.05 Units/kg SubCUTAneous TID  Orly Parada MD   6 Units at 10/14/21 1744    insulin lispro (HUMALOG) injection vial 0-12 Units  0-12 Units SubCUTAneous TID  Orly Parada MD   2 Units at 10/14/21 1302    insulin lispro (HUMALOG) injection vial 0-6 Units  0-6 Units SubCUTAneous Nightly Orly Parada MD   1 Units at 10/12/21 2041    sodium chloride flush 0.9 % injection 10 mL  10 mL IntraVENous 2 times per day Orly Parada MD   10 mL at 10/14/21 0846    sodium chloride flush 0.9 % injection 10 mL  10 mL IntraVENous PRN Orly Parada MD        0.9 % sodium chloride infusion  25 mL IntraVENous PRN Orly Parada MD        LORazepam (ATIVAN) tablet 1 mg  1 mg Oral Q1H PRN Aundrea MALDONADO Aditya Pickering MD        Or    LORazepam (ATIVAN) injection 1 mg  1 mg IntraVENous Q1H PRN Leslie Groves MD   1 mg at 10/11/21 2032    Or    LORazepam (ATIVAN) tablet 2 mg  2 mg Oral Q1H PRN Leslie Groves MD        Or    LORazepam (ATIVAN) injection 2 mg  2 mg IntraVENous Q1H PRN Leslie Groves MD   2 mg at 10/11/21 2239    thiamine (B-1) injection 100 mg  100 mg IntraVENous Daily Leslie Groves MD   100 mg at 10/14/21 7638    therapeutic multivitamin-minerals 1 tablet  1 tablet Oral Daily Leslie Groves MD   1 tablet at 10/14/21 0845    nicotine (NICODERM CQ) 14 MG/24HR 1 patch  1 patch TransDERmal Daily Leslie Groves MD   1 patch at 10/14/21 0845    [Held by provider] heparin (porcine) injection 5,000 Units  5,000 Units SubCUTAneous 3 times per day Leslie Groves MD   5,000 Units at 10/12/21 0551     Allergies   Allergen Reactions    Morphine Nausea And Vomiting      reports that he has been smoking cigarettes. He has a 16.50 pack-year smoking history. He has never used smokeless tobacco. He reports previous alcohol use. He reports that he does not use drugs. Objective:  Exam:   Constitutional:   Vitals:    10/15/21 0435 10/15/21 0750 10/15/21 0856 10/15/21 1156   BP:   136/70    Pulse:   72    Resp: 18 17 18    Temp:   97.6 °F (36.4 °C)    TempSrc:   Axillary    SpO2:  96% 97% 95%   Weight:       Height:         General appearance:  Normal development and appear in no acute distress. Mental Status:   Oriented to person, place, problem, and time. Memory: Good immediate recall. Intact remote memory  Normal attention span and concentration. Language: intact naming, repeating and fluency   Good fund of Knowledge. Cranial Nerves:   II: Visual fields: Full. Pupils: equal, round, reactive to light  III,IV,VI: Extra Ocular Movements are intact.  No nystagmus  V: Facial sensation is intact  VII: Facial strength and movements: intact and symmetric  IX: Palate elevation is symmetric  XI: Shoulder shrug is intact  XII: Tongue movements are normal  Musculoskeletal: 5/5 in all 4 extremities. Tone: Normal tone. Reflexes: Symmetric 2+ in both arms and legs. Coordination: no pronator drift, no dysmetria with FNF  Sensation: normal to all modalities in both arms and legs. Gait/Posture: steady gait        Data:  LABS:   Lab Results   Component Value Date     10/15/2021    K 4.2 10/15/2021    K 5.1 10/12/2021    CL 96 10/15/2021    CO2 21 10/15/2021    BUN 43 10/15/2021    CREATININE 6.8 10/15/2021    GFRAA 10 10/15/2021    GFRAA >60 05/17/2013    LABGLOM 9 10/15/2021    GLUCOSE 197 10/15/2021    PHOS 7.6 10/15/2021    MG 2.00 10/15/2021    CALCIUM 8.3 10/15/2021     Lab Results   Component Value Date    WBC 7.0 10/15/2021    RBC 3.51 10/15/2021    HGB 10.6 10/15/2021    HCT 31.9 10/15/2021    MCV 90.8 10/15/2021    RDW 15.5 10/15/2021     10/15/2021     Lab Results   Component Value Date    INR 0.98 10/11/2021    PROTIME 11.1 10/11/2021       Neuroimaging was independently reviewed by me and discussed results with the patient  I reviewed blood testing and other test results and discussed results with the patient      Impression:    Subacute lacunar CVA, likely ischemic, anterior left thalamus. Suspect medication non-compliance is an issue. Recent right cerebellar CVA 9/2021  ESRD on HD  HTN  CAD  DM2 with neuropathy, uncontrolled.  A1c 9.9  HLD  Obesity  ZAINAB    Recommendation    No need to repeat carotid US and ECHO. Cardiology ordered 30-day event monitor to evaluate for PAF - patient states he was wearing at home and mailed back a couple days ago. Stop ASA and change to Plavix given recurrent CVA. Discussed with patient. Continue statin. Continue home BP meds. Avoid hypotension. Continue home Lantus.  Add SSI.  Improve glycemic control.    Continue home gabapentin and [facility]  Nephrology managing HD.    Continue CPAP nightly.   PT/OT/SLP    Needs lifestyle modification and aggressive risk factor reduction. Discussed secondary stroke prevention with patient. May be discharged from a neurological perspective when medically stable. Robin Ramon CNP      This dictation was generated by voice recognition computer software. Although all attempts are made to edit the dictation for accuracy, there may be errors in the transcription that are not intended.

## 2021-10-16 LAB
BLOOD CULTURE, ROUTINE: NORMAL
CULTURE, BLOOD 2: NORMAL

## 2021-10-17 NOTE — PROGRESS NOTES
Katia Cardoza (:  1970) is a 46 y.o. male,Established patient, here for evaluation of the following chief complaint(s):  Follow-Up from Hospital (stroke) and Diabetes         ASSESSMENT/PLAN:  1. Need for prophylactic vaccination and inoculation against influenza  -     INFLUENZA, MDCK QUADV, 2 YRS AND OLDER, IM, PF, PREFILL SYR OR SDV, 0.5ML (FLUCELVAX QUADV, PF)  2. DM (diabetes mellitus), secondary, uncontrolled, w/neurologic complic ([facility] [address]  -     Continuous Blood Gluc Sensor (DEXCOM G6 SENSOR) MISC; Every 10 days, Disp-9 each, R-3Print  -     Continuous Blood Gluc Transmit (DEXCOM G6 TRANSMITTER) MISC; 1 each by Does not apply route every 3 months, Disp-1 each, R-3Print  -     Continuous Blood Gluc  (DEXCOM G6 ) ADAM; 1 each by Does not apply route Daily with lunch, Disp-1 each, R-0Print  3. Depression, unspecified depression type  -     DULoxetine (CYMBALTA) 60 MG extended release capsule; TAKE 1 CAPSULE BY MOUTH EVERY DAY, Disp-30 capsule, R-5Normal      No follow-ups on file. Subjective   SUBJECTIVE/OBJECTIVE:  Katia Cardoza is a 46 y.o. male. Patient presents with: Follow-Up from Hospital: stroke  Diabetes      57-year-old male with poorly controlled diabetes mellitus and kidney failure who presents for follow-up. Patient continues on dialysis. He has not been controlling his sugars very well. He would like a Dexcom continuous glucose monitor. Patient has been trying to watch what he is eating. He continues to smoke cigarettes. Believe he is no longer candidate for renal transplant. Patient continues to be very depressed. Is continue to not take care of himself at times. Feels low motivation. The patients PMH, surgical history, family history, medications, allergies were all reviewed and updated as appropriate today. Diabetes        Review of Systems       Objective   Physical Exam  Vitals and nursing note reviewed.    Constitutional:       Appearance: Normal appearance. He is well-developed. HENT:      Head: Normocephalic and atraumatic. Right Ear: External ear normal.      Left Ear: External ear normal.      Nose: Nose normal.   Eyes:      Conjunctiva/sclera: Conjunctivae normal.      Pupils: Pupils are equal, round, and reactive to light. Cardiovascular:      Rate and Rhythm: Normal rate and regular rhythm. Heart sounds: Normal heart sounds. No murmur heard. No friction rub. No gallop. Pulmonary:      Effort: Pulmonary effort is normal. No respiratory distress. Breath sounds: Normal breath sounds. No wheezing. Abdominal:      General: Bowel sounds are normal. There is no distension. Palpations: Abdomen is soft. Tenderness: There is no abdominal tenderness. Musculoskeletal:         General: No tenderness. Normal range of motion. Cervical back: Normal range of motion and neck supple. Skin:     General: Skin is warm and dry. Neurological:      Mental Status: He is alert and oriented to person, place, and time. Deep Tendon Reflexes: Reflexes are normal and symmetric. Psychiatric:         Behavior: Behavior normal.         Thought Content: Thought content normal.         Judgment: Judgment normal.              An electronic signature was used to authenticate this note.     --Meryle Flight, MD

## 2021-10-18 ENCOUNTER — FOLLOWUP TELEPHONE ENCOUNTER (OUTPATIENT)
Dept: TELEMETRY | Age: 51
End: 2021-10-18

## 2021-10-18 DIAGNOSIS — G89.4 CHRONIC PAIN SYNDROME: ICD-10-CM

## 2021-10-18 DIAGNOSIS — G89.4 CHRONIC PAIN SYNDROME: Primary | ICD-10-CM

## 2021-10-18 DIAGNOSIS — F39 MOOD DISORDER (HCC): ICD-10-CM

## 2021-10-18 DIAGNOSIS — G47.00 INSOMNIA, UNSPECIFIED TYPE: ICD-10-CM

## 2021-10-18 DIAGNOSIS — K21.9 GASTROESOPHAGEAL REFLUX DISEASE WITHOUT ESOPHAGITIS: ICD-10-CM

## 2021-10-18 RX ORDER — QUETIAPINE FUMARATE 50 MG/1
TABLET, FILM COATED ORAL
Qty: 30 TABLET | Refills: 2 | Status: SHIPPED | OUTPATIENT
Start: 2021-10-18 | End: 2022-01-25

## 2021-10-18 RX ORDER — OXYCODONE AND ACETAMINOPHEN 7.5; 325 MG/1; MG/1
1 TABLET ORAL EVERY 4 HOURS PRN
Qty: 10 TABLET | Refills: 0 | Status: SHIPPED | OUTPATIENT
Start: 2021-10-18 | End: 2021-10-21 | Stop reason: SDUPTHER

## 2021-10-18 RX ORDER — PANTOPRAZOLE SODIUM 40 MG/1
TABLET, DELAYED RELEASE ORAL
Qty: 30 TABLET | Refills: 1 | Status: SHIPPED | OUTPATIENT
Start: 2021-10-18 | End: 2021-12-21

## 2021-10-18 RX ORDER — CYCLOBENZAPRINE HCL 10 MG
TABLET ORAL
Qty: 90 TABLET | Refills: 2 | Status: SHIPPED | OUTPATIENT
Start: 2021-10-18 | End: 2021-10-26 | Stop reason: SDUPTHER

## 2021-10-18 NOTE — TELEPHONE ENCOUNTER
Last Office Visit  -   10/5/21  Next Office Visit  -  10/20/21    Last Filled  -    Last UDS -    Contract -

## 2021-10-18 NOTE — TELEPHONE ENCOUNTER
Spoke to patient for hospital follow up. Pt states he has not been weighing self. States he has a scale but forgets to do so. Discussed importance of daily weight monitoring. Pt verbalized understanding. Stated he felt his call light was not answered quickly. Otherwise he felt his stay was great.  Dinesh Dukes RN

## 2021-10-18 NOTE — TELEPHONE ENCOUNTER
1st Attempt; No Answer- Left HIPAA compliant voicemail with Non-Urgent Heart Failure Resource Line number for call back. Hospital follow up scheduled for 10/20/21 at 1pm with León Cruz MD and [facility] to follow pt at home.        Barak Walker RN

## 2021-10-18 NOTE — TELEPHONE ENCOUNTER
Received call from patient requesting oxyCODONE-acetaminophen (PERCOCET) 7.5-325 MG per tablet    Last Office Visit  -  10/5/21  Next Office Visit  -  10/20/21

## 2021-10-18 NOTE — TELEPHONE ENCOUNTER
Last Office Visit  -  10/5/21  Next Office Visit  -  10/20/21    Last Filled  -  10/15/21 (#10)   Last UDS -  n/a  Contract -  n/a

## 2021-10-21 DIAGNOSIS — G89.4 CHRONIC PAIN SYNDROME: ICD-10-CM

## 2021-10-21 RX ORDER — OXYCODONE AND ACETAMINOPHEN 7.5; 325 MG/1; MG/1
1 TABLET ORAL EVERY 4 HOURS PRN
Qty: 120 TABLET | Refills: 0 | Status: SHIPPED | OUTPATIENT
Start: 2021-10-21 | End: 2021-11-19 | Stop reason: SDUPTHER

## 2021-10-21 NOTE — TELEPHONE ENCOUNTER
Pt calling in for refill of the oxycodone. There was a fill on 10/18/21, but it was only for 10 tablets. Patient stated he usually gets 120 tablets. Pt is out of medication. Please advise.     Aaron Hedrick

## 2021-10-26 ENCOUNTER — VIRTUAL VISIT (OUTPATIENT)
Dept: FAMILY MEDICINE CLINIC | Age: 51
End: 2021-10-26
Payer: COMMERCIAL

## 2021-10-26 DIAGNOSIS — Z09 HOSPITAL DISCHARGE FOLLOW-UP: Primary | ICD-10-CM

## 2021-10-26 DIAGNOSIS — K59.00 CONSTIPATION, UNSPECIFIED CONSTIPATION TYPE: ICD-10-CM

## 2021-10-26 DIAGNOSIS — G89.4 CHRONIC PAIN SYNDROME: ICD-10-CM

## 2021-10-26 DIAGNOSIS — I63.9 CEREBROVASCULAR ACCIDENT (CVA), UNSPECIFIED MECHANISM (HCC): ICD-10-CM

## 2021-10-26 DIAGNOSIS — M25.511 ACUTE PAIN OF RIGHT SHOULDER: ICD-10-CM

## 2021-10-26 PROCEDURE — 1111F DSCHRG MED/CURRENT MED MERGE: CPT | Performed by: NURSE PRACTITIONER

## 2021-10-26 PROCEDURE — 99443 PR PHYS/QHP TELEPHONE EVALUATION 21-30 MIN: CPT | Performed by: NURSE PRACTITIONER

## 2021-10-26 RX ORDER — CYCLOBENZAPRINE HCL 10 MG
TABLET ORAL
Qty: 90 TABLET | Refills: 2 | Status: SHIPPED | OUTPATIENT
Start: 2021-10-26 | End: 2022-01-24

## 2021-10-26 RX ORDER — DOCUSATE SODIUM 100 MG/1
100 CAPSULE, LIQUID FILLED ORAL 2 TIMES DAILY
Qty: 60 CAPSULE | Refills: 5 | Status: SHIPPED | OUTPATIENT
Start: 2021-10-26 | End: 2021-11-12

## 2021-10-26 RX ORDER — GABAPENTIN 100 MG/1
CAPSULE ORAL
Qty: 180 CAPSULE | Refills: 5 | Status: SHIPPED | OUTPATIENT
Start: 2021-10-26 | End: 2021-11-29

## 2021-10-26 NOTE — PROGRESS NOTES
Post-Discharge Transitional Care Management Services or Hospital Follow Up      Gisela Argueta   YOB: 1970    Date of Office Visit:  10/26/2021  Date of Hospital Admission: 10/11/21  Date of Hospital Discharge: 10/15/21  Readmission Risk Score(high >=14%. Medium >=10%):Readmission Risk Score: 82      Care management risk score Rising risk (score 2-5) and Complex Care (Scores >=6): 8     Non face to face  following discharge, date last encounter closed (first attempt may have been earlier): *No documented post hospital discharge outreach found in the last 14 days *No documented post hospital discharge outreach found in the last 14 days    Call initiated 2 business days of discharge: *No response recorded in the last 14 days     Patient Active Problem List   Diagnosis    Acute respiratory failure with hypoxia and hypercapnia (HCC)    Chronic obstructive pulmonary disease ([address]    CAD S/P percutaneous coronary angioplasty    PVD (peripheral vascular disease) ([address]    Nonischemic cardiomyopathy ([address]    Sleep apnea    Bicuspid aortic valve    Bilateral hilar adenopathy syndrome    Claudication in peripheral vascular disease ([address]    Essential hypertension    Diabetic neuropathy ([address]    Type 2 diabetes, uncontrolled, with neuropathy ([address]    Passive smoke exposure    Depression with anxiety    Hematoma    Pneumonia of right upper lobe due to infectious organism    DM (diabetes mellitus), secondary, uncontrolled, w/neurologic complic ([address]    Hypertensive heart disease with congestive heart failure with preserved left ventricular function ([address]    CHF exacerbation ([address]    Chest pain    Coronary artery disease involving native coronary artery of native heart without angina pectoris    Obesity (BMI 30-39. 9)    ZAINAB on CPAP    Degeneration of lumbar or lumbosacral intervertebral disc    Lumbar radiculopathy    Lumbosacral spondylosis without myelopathy    Biliary colic    Symptomatic cholelithiasis    Gastroparesis due to DM ([facility])    Angina, class IV ([facility])    Dyspnea    Elevated brain natriuretic peptide (BNP) level    Dyslipidemia    Respiratory distress    Hypoxia    Chest pressure    Hypertensive urgency    Acute on chronic combined systolic and diastolic heart failure ([facility])    Ischemic cardiomyopathy    Chronic renal failure, stage 5 ([facility])    Tobacco abuse    CKD stage 4 due to type 2 diabetes mellitus ([facility]    CVA (cerebral vascular accident) ([facility]    Arterial ischemic stroke, ICA, right, acute ([facility]    Type 2 diabetes mellitus without complication, without long-term current use of insulin ([facility])    HTN (hypertension), benign    ZAINAB (obstructive sleep apnea)    Diarrhea    Pleural effusion    Chronic anemia    Nonrheumatic aortic valve stenosis    Hypervolemia    Hyperkalemia    Obesity    Mucus plugging of bronchi    Hemodialysis-associated hypotension    Left arm pain    Dizziness    ESRD (end stage renal disease) on dialysis ([facility])    Hypotension due to drugs    Acute diastolic CHF (congestive heart failure) ([facility])    Neuromuscular disorder ([facility])    Acute combined systolic and diastolic CHF, NYHA class 4 ([facility])    Renovascular hypertension    Mixed hyperlipidemia    Cigarette nicotine dependence in remission    Acute respiratory failure ([facility])    Pulmonary edema    Fluid overload    Diastolic dysfunction    Anemia of chronic disease    SOB (shortness of breath)    Steal syndrome of dialysis vascular access ([facility])    Chronic, continuous use of opioids    Chronic bronchitis ([facility])    Nasal congestion    Hypercholesteremia    Bradycardia    S/P TAVR (transcatheter aortic valve replacement)    Syncope and collapse    Atrial fibrillation ([facility]    Former smoker    Generalized weakness    DKA, type 1, not at goal [facility])    Bilateral leg weakness    GBS (Guillain-Silverton syndrome) ([facility])    Sinus pause    Weakness of both lower extremities    Sinus bradycardia    Ataxia    Peripheral vascular occlusive disease (HCC)    Cellulitis of right foot    Iliac artery occlusion, right (HCC)    Cellulitis and abscess of hand    Type 2 diabetes mellitus with hyperglycemia (HCC)    Acute encephalopathy    Acute hypoxemic respiratory failure (HCC)    CHF (congestive heart failure) ([facility]    Acute cerebrovascular accident (CVA) ([address]    Speech problem    Left face and left arm tingling    Urinary tract infection with hematuria    Acute CVA (cerebrovascular accident) ([address]    Alcohol abuse, daily use       Allergies   Allergen Reactions    Morphine Nausea And Vomiting       Medications listed as ordered at the time of discharge from [facility] Medication Instructions JEANNETTE:    Printed on:10/26/21 9093   Medication Information                      albuterol (PROVENTIL) (2.5 MG/3ML) 0.083% nebulizer solution  INHALE 1 VIAL VIA NEBULIZER EVERY 6 HOURS AS NEEDED FOR WHEEZING             albuterol sulfate  (90 Base) MCG/ACT inhaler  Inhale 2 puffs into the lungs every 6 hours as needed for Wheezing             Alcohol Swabs PADS  USE AS DIRECTED             B Complex-C-Folic Acid (VIRT-CAPS) 1 MG CAPS  TAKE 1 CAPSULE BY MOUTH EVERY DAY             BASAGLAR KWIKPEN 100 UNIT/ML injection pen  ADMINISTER 60 UNITS UNDER THE SKIN EVERY NIGHT             Blood Glucose Monitoring Suppl ADAM  USE AS DIRECTED. blood glucose test strips (FREESTYLE LITE) strip  Daily As needed. blood glucose test strips (GLUCOSE METER TEST) strip  1 each by In Vitro route 5 times daily As needed. Calcium Acetate, Phos Binder, 667 MG CAPS  TAKE 1 CAPSULE BY MOUTH THREE TIMES DAILY WITH MEALS             calcium carbonate (TUMS) 500 MG chewable tablet  Take 1 tablet by mouth 3 times daily as needed for Heartburn.              carvedilol (COREG) 12.5 MG tablet  TAKE 1 TABLET BY MOUTH TWICE DAILY WITH MEALS clopidogrel (PLAVIX) 75 MG tablet  Take 1 tablet by mouth daily             Continuous Blood Gluc  ([address]) ADAM  1 each by Does not apply route Daily with lunch             Continuous Blood Gluc Sensor (DEXCOM G6 SENSOR) MISC  Every 10 days             Continuous Blood Gluc Transmit (DEXCOM G6 TRANSMITTER) MISC  1 each by Does not apply route every 3 months             cyclobenzaprine (FLEXERIL) 10 MG tablet  TAKE (1) TABLET BY MOUTH EVERY 8 HOURS AS NEEDED             DULoxetine (CYMBALTA) 60 MG extended release capsule  TAKE 1 CAPSULE BY MOUTH EVERY DAY             famotidine (PEPCID) 20 MG tablet  TAKE 1 TABLET BY MOUTH TWICE DAILY AS NEEDED FOR HEARTBURN             gabapentin (NEURONTIN) 100 MG capsule  TAKE 1 TO 2 CAPSULES BY MOUTH THREE TIMES A DAY             Glucose Blood (BLOOD GLUCOSE TEST STRIPS) STRP  TEST 3-4 TIMES DAILY, AS DIRECTED             glucose monitoring kit (FREESTYLE) monitoring kit  1 kit by Does not apply route daily             glucose monitoring kit (FREESTYLE) monitoring kit  1 kit by Does not apply route daily             insulin aspart (NOVOLOG FLEXPEN) 100 UNIT/ML injection pen  Inject 20 Units into the skin 3 times daily (before meals)             ipratropium-albuterol (DUONEB) 0.5-2.5 (3) MG/3ML SOLN nebulizer solution  Inhale 3 mLs into the lungs every 6 hours as needed for Shortness of Breath             LINZESS 145 MCG capsule  TAKE 1 CAPSULE BY MOUTH EVERY MORNING BEFORE BREAKFAST             Multiple Vitamins-Minerals (THERAPEUTIC MULTIVITAMIN-MINERALS) tablet  Take 1 tablet by mouth daily             nitroGLYCERIN (NITROSTAT) 0.4 MG SL tablet  DISSOLVE 1 TABLET UNDER THE TONGUE AS NEEDED FOR CHEST PAIN EVERY 5 MINUTES UP TO 3 TIMES. IF NO RELIEF CALL 911.              ondansetron (ZOFRAN ODT) 4 MG disintegrating tablet  Take 1 tablet by mouth every 8 hours as needed for Nausea             pantoprazole (PROTONIX) 40 MG tablet  TAKE 1 TABLET BY not apply route Daily with lunch 1 each 0    DULoxetine (CYMBALTA) 60 MG extended release capsule TAKE 1 CAPSULE BY MOUTH EVERY DAY 30 capsule 5    carvedilol (COREG) 12.5 MG tablet TAKE 1 TABLET BY MOUTH TWICE DAILY WITH MEALS 60 tablet 10    traZODone (DESYREL) 150 MG tablet TAKE ONE (1) TABLET BY MOUTH NIGHTLY 30 tablet 10    pravastatin (PRAVACHOL) 40 MG tablet TAKE 1 TABLET BY MOUTH EACH EVENING 30 tablet 10    B Complex-C-Folic Acid (VIRT-CAPS) 1 MG CAPS TAKE 1 CAPSULE BY MOUTH EVERY DAY 90 capsule 1    torsemide (DEMADEX) 100 MG tablet Take 1 tablet by mouth daily 30 tablet 3    Calcium Acetate, Phos Binder, 667 MG CAPS TAKE 1 CAPSULE BY MOUTH THREE TIMES DAILY WITH MEALS 90 capsule 3    BASAGLAR KWIKPEN 100 UNIT/ML injection pen ADMINISTER 60 UNITS UNDER THE SKIN EVERY NIGHT 15 mL 5    albuterol (PROVENTIL) (2.5 MG/3ML) 0.083% nebulizer solution INHALE 1 VIAL VIA NEBULIZER EVERY 6 HOURS AS NEEDED FOR WHEEZING 300 mL 5    famotidine (PEPCID) 20 MG tablet TAKE 1 TABLET BY MOUTH TWICE DAILY AS NEEDED FOR HEARTBURN 180 tablet 0    LINZESS 145 MCG capsule TAKE 1 CAPSULE BY MOUTH EVERY MORNING BEFORE BREAKFAST 30 capsule 10    blood glucose test strips (GLUCOSE METER TEST) strip 1 each by In Vitro route 5 times daily As needed. 100 each 3    nitroGLYCERIN (NITROSTAT) 0.4 MG SL tablet DISSOLVE 1 TABLET UNDER THE TONGUE AS NEEDED FOR CHEST PAIN EVERY 5 MINUTES UP TO 3 TIMES. IF NO RELIEF CALL 911. 25 tablet 10    insulin aspart (NOVOLOG FLEXPEN) 100 UNIT/ML injection pen Inject 20 Units into the skin 3 times daily (before meals) 15 pen 5    blood glucose test strips (FREESTYLE LITE) strip Daily As needed.  100 strip 3    vitamin D (ERGOCALCIFEROL) 55520 units CAPS capsule TK 1 C PO WEEKLY  11    Tiotropium Bromide-Olodaterol (STIOLTO RESPIMAT) 2.5-2.5 MCG/ACT AERS Inhale 2 puffs into the lungs daily 2 Inhaler 0    Polyethylene Glycol 3350 GRAN       Blood Glucose Monitoring Suppl ADAM USE AS DIRECTED. 1 Device 0    Alcohol Swabs PADS USE AS DIRECTED 300 each 3    albuterol sulfate  (90 Base) MCG/ACT inhaler Inhale 2 puffs into the lungs every 6 hours as needed for Wheezing 1 Inhaler 3    ipratropium-albuterol (DUONEB) 0.5-2.5 (3) MG/3ML SOLN nebulizer solution Inhale 3 mLs into the lungs every 6 hours as needed for Shortness of Breath 360 mL 1    calcium carbonate (TUMS) 500 MG chewable tablet Take 1 tablet by mouth 3 times daily as needed for Heartburn. Medications patient taking as of now reconciled against medications ordered at time of hospital discharge: Yes    Chief Complaint   Patient presents with    Follow-Up from Hospital       HPI   Patient presents today virtually over the phone for  Has a chronic back and hip pain. Now has shoulder pain, right shoulder can not lift it with our it hurting. Has been going on a bout a week. Around the same time he was in the hospital.   He has tried heat and ice on it. Did therapy at home but she discharged him. Inpatient course: Discharge summary reviewed- see chart. Interval history/Current status: stable    Review of Systems    There were no vitals filed for this visit. There is no height or weight on file to calculate BMI. Wt Readings from Last 3 Encounters:   10/15/21 256 lb 13.4 oz (116.5 kg)   10/05/21 257 lb (116.6 kg)   09/10/21 261 lb 11 oz (118.7 kg)     BP Readings from Last 3 Encounters:   10/15/21 109/61   10/05/21 122/80   09/12/21 (!) 149/71       Physical Exam        Assessment/Plan:  There are no diagnoses linked to this encounter.       Medical Decision Making: {TCMPN4:29255}

## 2021-10-26 NOTE — PROGRESS NOTES
Shane Rae is a 46 y.o. male evaluated via telephone on 10/26/2021. Consent:  He and/or health care decision maker is aware that that he may receive a bill for this telephone service, depending on his insurance coverage, and has provided verbal consent to proceed: Yes      Documentation:  I communicated with the patient and/or health care decision maker about. Details of this discussion including any medical advice provided:     Patient presents today virtually over the phone for hospital follow up. He was diagnosed with a CVA. He was seen by neuro in the hospital.   He also has chronic back and hip. Patient states he was told he needs surgery but declines it. He is on percocet for pain. He also has shoulder pain on the right side. He states he can not lift his shoulder at all. It has been going on for about a week. The pain started around the same time the pain started. He has tried heat and ice. After the hospital he did some therapy at home but he states they discharged him. He is still having some weakness on the left side. Rafi Arciniega was seen today for follow-up from hospital.    Diagnoses and all orders for this visit:    Hospital discharge follow-up  -     Maria Del Carmen Camargo MD, Neurology, [facility]    Acute pain of right shoulder  Continue with ice and see ortho for pain. -     Alexey Bills MD, Orthopedic Surgery, [facility]    Chronic pain syndrome  Refills sent. -     cyclobenzaprine (FLEXERIL) 10 MG tablet; TAKE (1) TABLET BY MOUTH EVERY 8 HOURS AS NEEDED  -     gabapentin (NEURONTIN) 100 MG capsule; TAKE 1 TO 2 CAPSULES BY MOUTH THREE TIMES A DAY    Constipation, unspecified constipation type  Refill sent  -     docusate sodium (COLACE) 100 MG capsule; Take 1 capsule by mouth 2 times daily    Cerebrovascular accident (CVA), unspecified mechanism ([address]  Will follow up with neuro.    -     [facility] - Lemuel Lisa MD, Neurology, [facility]          I affirm this is a Patient Initiated Episode with a Patient who has not had a related appointment within my department in the past 7 days or scheduled within the next 24 hours. Patient identification was verified at the start of the visit: Yes    Total Time: minutes: 21-30 minutes    The visit was conducted pursuant to the emergency declaration under the [address] [place] [address], [address] [place] [address] and the [employer] Act. Patient identification was verified, and a caregiver was present when appropriate. The patient was located in a state where the provider was credentialed to provide care.     Note: not billable if this call serves to triage the patient into an appointment for the relevant concern      Alex Mayorga, JAY - CNP

## 2021-11-02 DIAGNOSIS — G89.4 CHRONIC PAIN SYNDROME: ICD-10-CM

## 2021-11-02 NOTE — TELEPHONE ENCOUNTER
Spoke to pt and he stated that his last fill was for #10 on 10/18. A new rx for #120 was sent on 10/21. He will contact [facility] to see if he can pick that up.

## 2021-11-02 NOTE — TELEPHONE ENCOUNTER
Last Office Visit  -  10-  Next Office Visit  -     Last Filled  -   Last UDS -    Contract -      Refill  Percocet     [facility] [place]

## 2021-11-04 ENCOUNTER — TELEPHONE (OUTPATIENT)
Dept: CARDIOLOGY CLINIC | Age: 51
End: 2021-11-04

## 2021-11-04 DIAGNOSIS — I48.91 ATRIAL FIBRILLATION, UNSPECIFIED TYPE (HCC): ICD-10-CM

## 2021-11-04 DIAGNOSIS — I63.9 ACUTE CEREBROVASCULAR ACCIDENT (CVA) (HCC): ICD-10-CM

## 2021-11-05 ENCOUNTER — TELEPHONE (OUTPATIENT)
Dept: FAMILY MEDICINE CLINIC | Age: 51
End: 2021-11-05

## 2021-11-05 RX ORDER — OXYCODONE AND ACETAMINOPHEN 7.5; 325 MG/1; MG/1
1 TABLET ORAL EVERY 4 HOURS PRN
Qty: 120 TABLET | Refills: 0 | Status: CANCELLED | OUTPATIENT
Start: 2021-11-05 | End: 2021-11-08

## 2021-11-05 NOTE — TELEPHONE ENCOUNTER
Pt called back and states that he fell down his steps and his hips are hurting so he has taken an extra pill or two. I told him that he can't fill this prescription again until after 11/21.

## 2021-11-11 DIAGNOSIS — G89.4 CHRONIC PAIN SYNDROME: ICD-10-CM

## 2021-11-11 RX ORDER — OXYCODONE AND ACETAMINOPHEN 7.5; 325 MG/1; MG/1
1 TABLET ORAL EVERY 4 HOURS PRN
Qty: 120 TABLET | Refills: 0 | OUTPATIENT
Start: 2021-11-11 | End: 2021-11-14

## 2021-11-11 NOTE — TELEPHONE ENCOUNTER
Mami Freire [phone] (home)    is requesting refill(s) of medication oxyCODONE-acetaminophen  to [facility] pharmacy [address] [place] [address]

## 2021-11-11 NOTE — TELEPHONE ENCOUNTER
Last Office Visit  -  10/26/21  Next Office Visit  -  n/a    Last Filled  -  10/21/21  Last UDS -  n/a  Contract -  N/a

## 2021-11-12 DIAGNOSIS — K59.00 CONSTIPATION, UNSPECIFIED CONSTIPATION TYPE: ICD-10-CM

## 2021-11-12 RX ORDER — DOCUSATE SODIUM 100 MG/1
CAPSULE, LIQUID FILLED ORAL
Qty: 60 CAPSULE | Refills: 5 | Status: ON HOLD | OUTPATIENT
Start: 2021-11-12 | End: 2022-04-27 | Stop reason: HOSPADM

## 2021-11-12 NOTE — TELEPHONE ENCOUNTER
Last Office Visit  -  10/26/21  Next Office Visit  -  n/a    Last Filled  -    Last UDS -    Contract -

## 2021-11-16 DIAGNOSIS — G89.4 CHRONIC PAIN SYNDROME: ICD-10-CM

## 2021-11-16 RX ORDER — CLOPIDOGREL BISULFATE 75 MG/1
TABLET ORAL
Qty: 30 TABLET | Refills: 3 | OUTPATIENT
Start: 2021-11-16

## 2021-11-16 RX ORDER — CLOPIDOGREL BISULFATE 75 MG/1
TABLET ORAL
Qty: 90 TABLET | Refills: 1 | Status: SHIPPED | OUTPATIENT
Start: 2021-11-16 | End: 2022-02-10 | Stop reason: SDUPTHER

## 2021-11-16 NOTE — TELEPHONE ENCOUNTER
9/12/21 [facility] in hospital    No upcoming appt    Last refill was with Pepe Grant 10/15/2021

## 2021-11-16 NOTE — TELEPHONE ENCOUNTER
----- Message from [place] sent at 11/16/2021  5:36 PM EST -----  Subject: Refill Request    QUESTIONS  Name of Medication? oxyCODONE-acetaminophen (PERCOCET) 7.5-325 MG per   tablet  Patient-reported dosage and instructions? 7.5-325 MG, 1 tablet every 4   hours  How many days do you have left? 0  Preferred Pharmacy? [id number]  Pharmacy phone number (if available)? [phone]  ---------------------------------------------------------------------------  --------------  Angel YAN  What is the best way for the office to contact you? Do not leave any   message, patient will call back for answer  Preferred Call Back Phone Number?  [phone]

## 2021-11-17 RX ORDER — OXYCODONE AND ACETAMINOPHEN 7.5; 325 MG/1; MG/1
1 TABLET ORAL EVERY 4 HOURS PRN
Qty: 120 TABLET | Refills: 0 | OUTPATIENT
Start: 2021-11-17 | End: 2021-11-20

## 2021-11-18 ENCOUNTER — NURSE TRIAGE (OUTPATIENT)
Dept: OTHER | Facility: CLINIC | Age: 51
End: 2021-11-18

## 2021-11-18 NOTE — TELEPHONE ENCOUNTER
Received call from Karina Culver at [facility] with Red Flag Complaint. Brief description of triage: back pain, chronic. Also new fall. See below assessment. Triage indicates for patient to be seen by HCP within 4 hours. Patient not agreeable refuses to go to hospital/[facility], insists on being seen by PCP at soonest appointment. Reiterated my disposition to the patient and expressed concern for the patient's well-being. Care advice provided, patient verbalizes understanding; denies any other questions or concerns; instructed to call back for any new or worsening symptoms. Writer provided warm transfer to [facility] at [facility] for appointment scheduling. Attention Provider: Thank you for allowing me to participate in the care of your patient. The patient was connected to triage in response to information provided to the ECC/PSC. Please do not respond through this encounter as the response is not directed to a shared pool. Reason for Disposition   [1] SEVERE back pain (e.g., excruciating, unable to do any normal activities) AND [2] not improved 2 hours after pain medicine    Answer Assessment - Initial Assessment Questions  1. ONSET: \"When did the pain begin? \"       Chronic back pain that began 10 years ago after a work accident. States that he fell yesterday walking from kitchen to living room. States that he uses walker at home but was not using it when he fell. States that his legs \"just gave out\". Did not hit his head. States that he takes percocet for his chronic back pain and it is \"overdue\" and is requesting for Dr. Rasheeda Villatoro to refill it. Also having left hip pain. States that he is having a hard time getting up at all. Has taken ibuprofen with no relief    2. LOCATION: \"Where does it hurt? \" (upper, mid or lower back)      See above. Left hip pain, mid-low back. 3. SEVERITY: \"How bad is the pain? \"  (e.g., Scale 1-10; mild, moderate, or severe)    - MILD (1-3): doesn't interfere with normal activities     - MODERATE (4-7): interferes with normal activities or awakens from sleep     - SEVERE (8-10): excruciating pain, unable to do any normal activities       10/10, ibuprofen no relief. 4. PATTERN: \"Is the pain constant? \" (e.g., yes, no; constant, intermittent)       Constant    5. RADIATION: \"Does the pain shoot into your legs or elsewhere? \"      Hips    6. CAUSE:  \"What do you think is causing the back pain? \"       Chronic back plain + recent fall he was not evaluated for    7. BACK OVERUSE:  Nando Dun recent lifting of heavy objects, strenuous work or exercise? \"      Denies    8. MEDICATIONS: \"What have you taken so far for the pain? \" (e.g., nothing, acetaminophen, NSAIDS)      See above    9. NEUROLOGIC SYMPTOMS: \"Do you have any weakness, numbness, or problems with bowel/bladder control? \"      Knees down bilateral is tingling - normal for him but has gotten worse since the fall. Denies loss of bowel/bladder control. 10. OTHER SYMPTOMS: \"Do you have any other symptoms? \" (e.g., fever, abdominal pain, burning with urination, blood in urine)        Denies    11. PREGNANCY: \"Is there any chance you are pregnant? \" (e.g., yes, no; LMP)        n/a    Protocols used: BACK PAIN-ADULT-

## 2021-11-19 DIAGNOSIS — G89.4 CHRONIC PAIN SYNDROME: ICD-10-CM

## 2021-11-19 RX ORDER — OXYCODONE AND ACETAMINOPHEN 7.5; 325 MG/1; MG/1
1 TABLET ORAL EVERY 4 HOURS PRN
Qty: 120 TABLET | Refills: 0 | Status: SHIPPED | OUTPATIENT
Start: 2021-11-19 | End: 2021-12-10 | Stop reason: SDUPTHER

## 2021-11-19 NOTE — TELEPHONE ENCOUNTER
----- Message from Dhiraj Light MA sent at 11/18/2021  5:49 PM EST -----  Subject: Appointment Request    Reason for Call: Semi-Urgent Return from RN Triage    QUESTIONS  Type of Appointment? Established Patient  Reason for appointment request? No appointments available during search  Additional Information for Provider? Return from nurse triage. Patient   back pain nurse said to be seen within 3 days. She also advised pt to go   to ER he refused. He is wanting a refill of the Percocet please advise. Patient has no call back number  ---------------------------------------------------------------------------  --------------  CALL BACK INFO  What is the best way for the office to contact you? Do not leave any   message, patient will call back for answer  Preferred Call Back Phone Number? [phone]  ---------------------------------------------------------------------------  --------------  SCRIPT ANSWERS  Patient needs to be seen today? No  Patient needs to be seen today or tomorrow? No  Patient needs to be seen within 3 days? Yes   Patient needs to be seen within 5 days? No  Patient can be seen for a routine visit? No  Nurse Name? darryn  Have you been diagnosed with, awaiting test results for, or told that you   are suspected of having COVID-19 (Coronavirus)? (If patient has tested   negative or was tested as a requirement for work, school, or travel and   not based on symptoms, answer no)? No  Within the past two weeks have you developed any of the following symptoms   (answer no if symptoms have been present longer than 2 weeks or began   more than 2 weeks ago)? Fever or Chills, Cough, Shortness of breath or   difficulty breathing, Loss of taste or smell, Sore throat, Nasal   congestion, Sneezing or runny nose, Fatigue or generalized body aches   (answer no if pain is specific to a body part e.g. back pain), Diarrhea,   Headache? No  Have you had close contact with someone with COVID-19 in the last 14 days? No  (Service Expert  click yes below to proceed with [employer] As Usual   Scheduling)?  Yes

## 2021-11-26 DIAGNOSIS — G89.4 CHRONIC PAIN SYNDROME: ICD-10-CM

## 2021-11-29 ENCOUNTER — HOSPITAL ENCOUNTER (INPATIENT)
Age: 51
LOS: 4 days | Discharge: HOME | DRG: 208 | End: 2021-12-03
Attending: EMERGENCY MEDICINE | Admitting: INTERNAL MEDICINE
Payer: COMMERCIAL

## 2021-11-29 ENCOUNTER — APPOINTMENT (OUTPATIENT)
Dept: GENERAL RADIOLOGY | Age: 51
DRG: 208 | End: 2021-11-29
Payer: COMMERCIAL

## 2021-11-29 DIAGNOSIS — J81.0 ACUTE PULMONARY EDEMA (HCC): ICD-10-CM

## 2021-11-29 DIAGNOSIS — Z99.2 ESRD (END STAGE RENAL DISEASE) ON DIALYSIS (HCC): Primary | ICD-10-CM

## 2021-11-29 DIAGNOSIS — J96.01 ACUTE HYPOXEMIC RESPIRATORY FAILURE (HCC): ICD-10-CM

## 2021-11-29 DIAGNOSIS — N18.6 ESRD (END STAGE RENAL DISEASE) ON DIALYSIS (HCC): Primary | ICD-10-CM

## 2021-11-29 PROBLEM — J96.91 RESPIRATORY FAILURE WITH HYPOXIA (HCC): Status: ACTIVE | Noted: 2021-11-29

## 2021-11-29 PROBLEM — I50.43 CHF (CONGESTIVE HEART FAILURE), NYHA CLASS I, ACUTE ON CHRONIC, COMBINED (HCC): Status: ACTIVE | Noted: 2021-11-29

## 2021-11-29 LAB
A/G RATIO: 1 (ref 1.1–2.2)
ALBUMIN SERPL-MCNC: 3.5 G/DL (ref 3.4–5)
ALP BLD-CCNC: 128 U/L (ref 40–129)
ALT SERPL-CCNC: 16 U/L (ref 10–40)
ANION GAP SERPL CALCULATED.3IONS-SCNC: 17 MMOL/L (ref 3–16)
AST SERPL-CCNC: 12 U/L (ref 15–37)
BASE EXCESS ARTERIAL: -9 MMOL/L (ref -3–3)
BASOPHILS ABSOLUTE: 0.1 K/UL (ref 0–0.2)
BASOPHILS RELATIVE PERCENT: 0.8 %
BILIRUB SERPL-MCNC: <0.2 MG/DL (ref 0–1)
BUN BLDV-MCNC: 88 MG/DL (ref 7–20)
CALCIUM SERPL-MCNC: 8.5 MG/DL (ref 8.3–10.6)
CARBOXYHEMOGLOBIN ARTERIAL: 0.7 % (ref 0–1.5)
CHLORIDE BLD-SCNC: 96 MMOL/L (ref 99–110)
CO2: 19 MMOL/L (ref 21–32)
CREAT SERPL-MCNC: 7.6 MG/DL (ref 0.9–1.3)
EKG ATRIAL RATE: 97 BPM
EKG DIAGNOSIS: NORMAL
EKG P AXIS: 70 DEGREES
EKG P-R INTERVAL: 186 MS
EKG Q-T INTERVAL: 370 MS
EKG QRS DURATION: 96 MS
EKG QTC CALCULATION (BAZETT): 469 MS
EKG R AXIS: 57 DEGREES
EKG T AXIS: 34 DEGREES
EKG VENTRICULAR RATE: 97 BPM
EOSINOPHILS ABSOLUTE: 0.3 K/UL (ref 0–0.6)
EOSINOPHILS RELATIVE PERCENT: 2.4 %
GFR AFRICAN AMERICAN: 9
GFR NON-AFRICAN AMERICAN: 8
GLUCOSE BLD-MCNC: 354 MG/DL (ref 70–99)
GLUCOSE BLD-MCNC: 490 MG/DL (ref 70–99)
HCO3 ARTERIAL: 19 MMOL/L (ref 21–29)
HCT VFR BLD CALC: 31.3 % (ref 40.5–52.5)
HEMOGLOBIN, ART, EXTENDED: 11.7 G/DL (ref 13.5–17.5)
HEMOGLOBIN: 10 G/DL (ref 13.5–17.5)
LACTIC ACID: 1 MMOL/L (ref 0.4–2)
LYMPHOCYTES ABSOLUTE: 0.6 K/UL (ref 1–5.1)
LYMPHOCYTES RELATIVE PERCENT: 5.6 %
MCH RBC QN AUTO: 28.8 PG (ref 26–34)
MCHC RBC AUTO-ENTMCNC: 31.8 G/DL (ref 31–36)
MCV RBC AUTO: 90.7 FL (ref 80–100)
METHEMOGLOBIN ARTERIAL: 0.2 %
MONOCYTES ABSOLUTE: 0.6 K/UL (ref 0–1.3)
MONOCYTES RELATIVE PERCENT: 5.3 %
NEUTROPHILS ABSOLUTE: 9.6 K/UL (ref 1.7–7.7)
NEUTROPHILS RELATIVE PERCENT: 85.9 %
O2 SAT, ARTERIAL: 83.9 %
O2 THERAPY: ABNORMAL
PCO2 ARTERIAL: 50.2 MMHG (ref 35–45)
PDW BLD-RTO: 16 % (ref 12.4–15.4)
PERFORMED ON: ABNORMAL
PH ARTERIAL: 7.2 (ref 7.35–7.45)
PLATELET # BLD: 245 K/UL (ref 135–450)
PMV BLD AUTO: 8.7 FL (ref 5–10.5)
PO2 ARTERIAL: 55.9 MMHG (ref 75–108)
POTASSIUM REFLEX MAGNESIUM: 5.2 MMOL/L (ref 3.5–5.1)
PRO-BNP: ABNORMAL PG/ML (ref 0–124)
PROCALCITONIN: 0.74 NG/ML (ref 0–0.15)
RAPID INFLUENZA  B AGN: NEGATIVE
RAPID INFLUENZA A AGN: NEGATIVE
RBC # BLD: 3.45 M/UL (ref 4.2–5.9)
SODIUM BLD-SCNC: 132 MMOL/L (ref 136–145)
TCO2 ARTERIAL: 20.5 MMOL/L
TOTAL PROTEIN: 6.9 G/DL (ref 6.4–8.2)
TROPONIN: 0.12 NG/ML
WBC # BLD: 11.2 K/UL (ref 4–11)

## 2021-11-29 PROCEDURE — 2060000000 HC ICU INTERMEDIATE R&B

## 2021-11-29 PROCEDURE — 99285 EMERGENCY DEPT VISIT HI MDM: CPT

## 2021-11-29 PROCEDURE — 83880 ASSAY OF NATRIURETIC PEPTIDE: CPT

## 2021-11-29 PROCEDURE — 80053 COMPREHEN METABOLIC PANEL: CPT

## 2021-11-29 PROCEDURE — 2500000003 HC RX 250 WO HCPCS: Performed by: EMERGENCY MEDICINE

## 2021-11-29 PROCEDURE — 87635 SARS-COV-2 COVID-19 AMP PRB: CPT

## 2021-11-29 PROCEDURE — 82803 BLOOD GASES ANY COMBINATION: CPT

## 2021-11-29 PROCEDURE — 6360000002 HC RX W HCPCS: Performed by: INTERNAL MEDICINE

## 2021-11-29 PROCEDURE — 93010 ELECTROCARDIOGRAM REPORT: CPT | Performed by: INTERNAL MEDICINE

## 2021-11-29 PROCEDURE — 96375 TX/PRO/DX INJ NEW DRUG ADDON: CPT

## 2021-11-29 PROCEDURE — 87804 INFLUENZA ASSAY W/OPTIC: CPT

## 2021-11-29 PROCEDURE — 84145 PROCALCITONIN (PCT): CPT

## 2021-11-29 PROCEDURE — 85025 COMPLETE CBC W/AUTO DIFF WBC: CPT

## 2021-11-29 PROCEDURE — 6370000000 HC RX 637 (ALT 250 FOR IP): Performed by: EMERGENCY MEDICINE

## 2021-11-29 PROCEDURE — 84484 ASSAY OF TROPONIN QUANT: CPT

## 2021-11-29 PROCEDURE — 93005 ELECTROCARDIOGRAM TRACING: CPT | Performed by: EMERGENCY MEDICINE

## 2021-11-29 PROCEDURE — 6360000002 HC RX W HCPCS: Performed by: EMERGENCY MEDICINE

## 2021-11-29 PROCEDURE — 71045 X-RAY EXAM CHEST 1 VIEW: CPT

## 2021-11-29 PROCEDURE — 36415 COLL VENOUS BLD VENIPUNCTURE: CPT

## 2021-11-29 PROCEDURE — 6370000000 HC RX 637 (ALT 250 FOR IP): Performed by: INTERNAL MEDICINE

## 2021-11-29 PROCEDURE — 96374 THER/PROPH/DIAG INJ IV PUSH: CPT

## 2021-11-29 PROCEDURE — 83605 ASSAY OF LACTIC ACID: CPT

## 2021-11-29 PROCEDURE — 2580000003 HC RX 258: Performed by: INTERNAL MEDICINE

## 2021-11-29 RX ORDER — IPRATROPIUM BROMIDE AND ALBUTEROL SULFATE 2.5; .5 MG/3ML; MG/3ML
1 SOLUTION RESPIRATORY (INHALATION) EVERY 6 HOURS PRN
Status: DISCONTINUED | OUTPATIENT
Start: 2021-11-29 | End: 2021-12-03 | Stop reason: HOSPADM

## 2021-11-29 RX ORDER — ALBUTEROL SULFATE 2.5 MG/3ML
SOLUTION RESPIRATORY (INHALATION)
Qty: 300 ML | Refills: 5 | Status: ON HOLD | OUTPATIENT
Start: 2021-11-29 | End: 2022-01-17 | Stop reason: HOSPADM

## 2021-11-29 RX ORDER — OXYCODONE AND ACETAMINOPHEN 7.5; 325 MG/1; MG/1
1 TABLET ORAL ONCE
Status: COMPLETED | OUTPATIENT
Start: 2021-11-29 | End: 2021-11-29

## 2021-11-29 RX ORDER — CARVEDILOL 3.12 MG/1
3.12 TABLET ORAL 2 TIMES DAILY WITH MEALS
Status: DISCONTINUED | OUTPATIENT
Start: 2021-11-30 | End: 2021-12-03 | Stop reason: HOSPADM

## 2021-11-29 RX ORDER — GABAPENTIN 100 MG/1
CAPSULE ORAL
Qty: 180 CAPSULE | Refills: 5 | Status: ON HOLD | OUTPATIENT
Start: 2021-11-29 | End: 2022-04-18 | Stop reason: ALTCHOICE

## 2021-11-29 RX ORDER — SODIUM CHLORIDE 0.9 % (FLUSH) 0.9 %
5-40 SYRINGE (ML) INJECTION EVERY 12 HOURS SCHEDULED
Status: DISCONTINUED | OUTPATIENT
Start: 2021-11-29 | End: 2021-12-03 | Stop reason: HOSPADM

## 2021-11-29 RX ORDER — DOCUSATE SODIUM 100 MG/1
100 CAPSULE, LIQUID FILLED ORAL 2 TIMES DAILY
Status: DISCONTINUED | OUTPATIENT
Start: 2021-11-29 | End: 2021-11-30

## 2021-11-29 RX ORDER — INSULIN GLARGINE 100 [IU]/ML
40 INJECTION, SOLUTION SUBCUTANEOUS ONCE
Status: COMPLETED | OUTPATIENT
Start: 2021-11-29 | End: 2021-11-29

## 2021-11-29 RX ORDER — PRAVASTATIN SODIUM 40 MG
40 TABLET ORAL NIGHTLY
Status: DISCONTINUED | OUTPATIENT
Start: 2021-11-29 | End: 2021-12-03 | Stop reason: HOSPADM

## 2021-11-29 RX ORDER — SODIUM CHLORIDE 0.9 % (FLUSH) 0.9 %
5-40 SYRINGE (ML) INJECTION PRN
Status: DISCONTINUED | OUTPATIENT
Start: 2021-11-29 | End: 2021-12-03 | Stop reason: HOSPADM

## 2021-11-29 RX ORDER — FAMOTIDINE 20 MG/1
20 TABLET, FILM COATED ORAL DAILY
Status: DISCONTINUED | OUTPATIENT
Start: 2021-11-30 | End: 2021-12-03 | Stop reason: HOSPADM

## 2021-11-29 RX ORDER — SODIUM CHLORIDE 9 MG/ML
25 INJECTION, SOLUTION INTRAVENOUS PRN
Status: DISCONTINUED | OUTPATIENT
Start: 2021-11-29 | End: 2021-12-03 | Stop reason: HOSPADM

## 2021-11-29 RX ORDER — POLYETHYLENE GLYCOL 3350 17 G/17G
17 POWDER, FOR SOLUTION ORAL DAILY PRN
Status: DISCONTINUED | OUTPATIENT
Start: 2021-11-29 | End: 2021-12-03 | Stop reason: HOSPADM

## 2021-11-29 RX ORDER — ACETAMINOPHEN 650 MG/1
650 SUPPOSITORY RECTAL EVERY 6 HOURS PRN
Status: DISCONTINUED | OUTPATIENT
Start: 2021-11-29 | End: 2021-12-01

## 2021-11-29 RX ORDER — ACETAMINOPHEN 325 MG/1
650 TABLET ORAL EVERY 6 HOURS PRN
Status: DISCONTINUED | OUTPATIENT
Start: 2021-11-29 | End: 2021-12-03 | Stop reason: HOSPADM

## 2021-11-29 RX ORDER — NITROGLYCERIN 0.4 MG/1
0.4 TABLET SUBLINGUAL EVERY 5 MIN PRN
Status: DISCONTINUED | OUTPATIENT
Start: 2021-11-29 | End: 2021-12-03 | Stop reason: HOSPADM

## 2021-11-29 RX ORDER — FUROSEMIDE 10 MG/ML
40 INJECTION INTRAMUSCULAR; INTRAVENOUS 3 TIMES DAILY
Status: DISCONTINUED | OUTPATIENT
Start: 2021-11-29 | End: 2021-11-30

## 2021-11-29 RX ORDER — LEVOFLOXACIN 5 MG/ML
250 INJECTION, SOLUTION INTRAVENOUS EVERY 24 HOURS
Status: DISCONTINUED | OUTPATIENT
Start: 2021-11-30 | End: 2021-11-30

## 2021-11-29 RX ORDER — INSULIN GLARGINE 100 [IU]/ML
55 INJECTION, SOLUTION SUBCUTANEOUS NIGHTLY
Status: DISCONTINUED | OUTPATIENT
Start: 2021-11-30 | End: 2021-12-01

## 2021-11-29 RX ORDER — GABAPENTIN 100 MG/1
100 CAPSULE ORAL 3 TIMES DAILY
Status: DISCONTINUED | OUTPATIENT
Start: 2021-11-29 | End: 2021-12-03 | Stop reason: HOSPADM

## 2021-11-29 RX ORDER — DULOXETIN HYDROCHLORIDE 30 MG/1
30 CAPSULE, DELAYED RELEASE ORAL DAILY
Status: DISCONTINUED | OUTPATIENT
Start: 2021-11-30 | End: 2021-12-03 | Stop reason: HOSPADM

## 2021-11-29 RX ORDER — HEPARIN SODIUM 1000 [USP'U]/ML
4100 INJECTION, SOLUTION INTRAVENOUS; SUBCUTANEOUS PRN
Status: DISCONTINUED | OUTPATIENT
Start: 2021-11-29 | End: 2021-12-03 | Stop reason: HOSPADM

## 2021-11-29 RX ORDER — ENALAPRILAT 2.5 MG/2ML
1.25 INJECTION INTRAVENOUS ONCE
Status: COMPLETED | OUTPATIENT
Start: 2021-11-29 | End: 2021-11-29

## 2021-11-29 RX ORDER — LISINOPRIL 2.5 MG/1
2.5 TABLET ORAL DAILY
Status: DISCONTINUED | OUTPATIENT
Start: 2021-11-30 | End: 2021-11-30

## 2021-11-29 RX ORDER — FUROSEMIDE 10 MG/ML
40 INJECTION INTRAMUSCULAR; INTRAVENOUS ONCE
Status: COMPLETED | OUTPATIENT
Start: 2021-11-29 | End: 2021-11-29

## 2021-11-29 RX ORDER — CLOPIDOGREL BISULFATE 75 MG/1
75 TABLET ORAL DAILY
Status: DISCONTINUED | OUTPATIENT
Start: 2021-11-29 | End: 2021-12-03 | Stop reason: HOSPADM

## 2021-11-29 RX ORDER — HEPARIN SODIUM 5000 [USP'U]/ML
5000 INJECTION, SOLUTION INTRAVENOUS; SUBCUTANEOUS EVERY 8 HOURS SCHEDULED
Status: DISCONTINUED | OUTPATIENT
Start: 2021-11-29 | End: 2021-12-03 | Stop reason: HOSPADM

## 2021-11-29 RX ORDER — QUETIAPINE FUMARATE 25 MG/1
50 TABLET, FILM COATED ORAL NIGHTLY
Status: DISCONTINUED | OUTPATIENT
Start: 2021-11-29 | End: 2021-12-03 | Stop reason: HOSPADM

## 2021-11-29 RX ORDER — ALBUTEROL SULFATE 2.5 MG/3ML
2.5 SOLUTION RESPIRATORY (INHALATION) EVERY 4 HOURS PRN
Status: DISCONTINUED | OUTPATIENT
Start: 2021-11-29 | End: 2021-12-03 | Stop reason: HOSPADM

## 2021-11-29 RX ORDER — ONDANSETRON 4 MG/1
4 TABLET, ORALLY DISINTEGRATING ORAL EVERY 8 HOURS PRN
Status: DISCONTINUED | OUTPATIENT
Start: 2021-11-29 | End: 2021-12-03 | Stop reason: HOSPADM

## 2021-11-29 RX ORDER — INSULIN GLARGINE 100 [IU]/ML
15 INJECTION, SOLUTION SUBCUTANEOUS NIGHTLY
Status: DISCONTINUED | OUTPATIENT
Start: 2021-11-29 | End: 2021-11-29

## 2021-11-29 RX ORDER — CARVEDILOL 3.12 MG/1
12.5 TABLET ORAL ONCE
Status: COMPLETED | OUTPATIENT
Start: 2021-11-29 | End: 2021-11-29

## 2021-11-29 RX ORDER — ONDANSETRON 2 MG/ML
4 INJECTION INTRAMUSCULAR; INTRAVENOUS EVERY 6 HOURS PRN
Status: DISCONTINUED | OUTPATIENT
Start: 2021-11-29 | End: 2021-12-03 | Stop reason: HOSPADM

## 2021-11-29 RX ADMIN — OXYCODONE AND ACETAMINOPHEN 1 TABLET: 7.5; 325 TABLET ORAL at 16:00

## 2021-11-29 RX ADMIN — SODIUM CHLORIDE, PRESERVATIVE FREE 10 ML: 5 INJECTION INTRAVENOUS at 23:35

## 2021-11-29 RX ADMIN — ENALAPRILAT 1.25 MG: 2.5 INJECTION INTRAVENOUS at 14:31

## 2021-11-29 RX ADMIN — CLOPIDOGREL BISULFATE 75 MG: 75 TABLET ORAL at 23:00

## 2021-11-29 RX ADMIN — QUETIAPINE FUMARATE 50 MG: 25 TABLET ORAL at 23:32

## 2021-11-29 RX ADMIN — CARVEDILOL 12.5 MG: 3.12 TABLET, FILM COATED ORAL at 14:27

## 2021-11-29 RX ADMIN — GABAPENTIN 100 MG: 100 CAPSULE ORAL at 22:59

## 2021-11-29 RX ADMIN — FUROSEMIDE 40 MG: 10 INJECTION, SOLUTION INTRAMUSCULAR; INTRAVENOUS at 23:00

## 2021-11-29 RX ADMIN — LEVOFLOXACIN 250 MG: 5 INJECTION, SOLUTION INTRAVENOUS at 23:39

## 2021-11-29 RX ADMIN — NITROGLYCERIN 1 INCH: 20 OINTMENT TOPICAL at 14:27

## 2021-11-29 RX ADMIN — INSULIN GLARGINE 15 UNITS: 100 INJECTION, SOLUTION SUBCUTANEOUS at 22:59

## 2021-11-29 RX ADMIN — OXYCODONE AND ACETAMINOPHEN 1 TABLET: 7.5; 325 TABLET ORAL at 15:04

## 2021-11-29 RX ADMIN — INSULIN GLARGINE 40 UNITS: 100 INJECTION, SOLUTION SUBCUTANEOUS at 23:49

## 2021-11-29 RX ADMIN — FUROSEMIDE 40 MG: 10 INJECTION, SOLUTION INTRAMUSCULAR; INTRAVENOUS at 14:31

## 2021-11-29 RX ADMIN — INSULIN LISPRO 3 UNITS: 100 INJECTION, SOLUTION INTRAVENOUS; SUBCUTANEOUS at 22:59

## 2021-11-29 RX ADMIN — PRAVASTATIN SODIUM 40 MG: 40 TABLET ORAL at 23:32

## 2021-11-29 RX ADMIN — HEPARIN SODIUM 5000 UNITS: 5000 INJECTION INTRAVENOUS; SUBCUTANEOUS at 23:32

## 2021-11-29 ASSESSMENT — PAIN DESCRIPTION - PAIN TYPE
TYPE: CHRONIC PAIN
TYPE: CHRONIC PAIN

## 2021-11-29 ASSESSMENT — PAIN SCALES - GENERAL
PAINLEVEL_OUTOF10: 8
PAINLEVEL_OUTOF10: 6
PAINLEVEL_OUTOF10: 6
PAINLEVEL_OUTOF10: 7

## 2021-11-29 ASSESSMENT — PAIN DESCRIPTION - LOCATION
LOCATION: BACK
LOCATION: BACK

## 2021-11-29 NOTE — ED PROVIDER NOTES
Emergency Physician Note        Note Open Time: 2:49 PM EST    Chief Complaint  Shortness of Breath (pt arrived from dialysis-completed approx 2 hours and pt states he started having SOB and wheezing; EMS gave solumedrol 125mg iv and duoneb x2pt was on NRb when EMS arrived; O2 sats 96 on rm air upon arrival)       History of Present Illness  Lucia Malone is a 46 y.o. male who presents to the ED for short of breath. Patient reports that he goes to Monday, Wednesday and Friday dialysis and went today to dialysis and was feeling well although somewhat distended in the abdomen with lower extremity edema. While at dialysis he became very short of breath and they discontinued after 2 hours and sent him to the ER. He denies any fevers or chest pain. No vomiting or diarrhea. No cough. He does take torsemide daily but usually does not take his blood pressure medicines until after dialysis. He has not taken any of them today. He does still make urine. His nephrologist is Dr. Tres Joseph. 10 systems reviewed, pertinent positives per HPI otherwise noted to be negative    I have reviewed the following from the nursing documentation:      Prior to Admission medications    Medication Sig Start Date End Date Taking? Authorizing Provider   carvedilol (COREG) 12.5 MG tablet TAKE 1 TABLET BY MOUTH TWICE DAILY WITH MEALS 9/29/21  Yes Shanta Cohen MD   torsemide (DEMADEX) 100 MG tablet Take 1 tablet by mouth daily 9/4/21  Yes Anil Gerard MD   nitroGLYCERIN (NITROSTAT) 0.4 MG SL tablet DISSOLVE 1 TABLET UNDER THE TONGUE AS NEEDED FOR CHEST PAIN EVERY 5 MINUTES UP TO 3 TIMES.  IF NO RELIEF CALL 911. 1/7/21  Yes Shanta Cohen MD   clopidogrel (PLAVIX) 75 MG tablet TAKE 1 TABLET BY MOUTH ONCE DAILY 11/16/21   Aleah Brink MD    MG capsule TAKE 1 CAPSULE BY MOUTH TWICE DAILY 11/12/21   Alex Mayorga APRN - CNP   cyclobenzaprine (FLEXERIL) 10 MG tablet TAKE (1) TABLET BY MOUTH EVERY 8 HOURS AS NEEDED 10/26/21   JAY Guajardo CNP   gabapentin (NEURONTIN) 100 MG capsule TAKE 1 TO 2 CAPSULES BY MOUTH THREE TIMES A DAY 10/26/21 3/8/22  JAY Guajardo CNP   QUEtiapine (SEROQUEL) 50 MG tablet TAKE 1 TABLET BY MOUTH IN THE EVENING 10/18/21   Shanta Shows, MD   pantoprazole (PROTONIX) 40 MG tablet TAKE 1 TABLET BY MOUTH EVERY MORNING BEFORE BREAKFAST 10/18/21   Shanta Shows, MD   thiamine (B-1) 100 MG/ML injection Infuse 1 mL intravenously daily 10/15/21   JAY Werner CNP   Multiple Vitamins-Minerals (THERAPEUTIC MULTIVITAMIN-MINERALS) tablet Take 1 tablet by mouth daily 10/15/21   JAY Werner CNP   Continuous Blood Gluc Sensor (DEXCOM G6 SENSOR) MISC Every 10 days 10/5/21   Shanta Shows, MD   Continuous Blood Gluc Transmit (DEXCOM G6 TRANSMITTER) MISC 1 each by Does not apply route every 3 months 10/5/21   Shanta Shows, MD   Continuous Blood Gluc  ([employer] ) [address] 1 each by Does not apply route Daily with lunch 10/5/21   Shanta Shows, MD   DULoxetine (CYMBALTA) 60 MG extended release capsule TAKE 1 CAPSULE BY MOUTH EVERY DAY 10/5/21   Shanta Shows, MD   traZODone (DESYREL) 150 MG tablet TAKE ONE (1) TABLET BY MOUTH NIGHTLY 9/29/21 Larissa Shows, MD   pravastatin (PRAVACHOL) 40 MG tablet TAKE 1 TABLET BY MOUTH EACH EVENING 9/29/21 Larissa Shows, MD   B Complex-C-Folic Acid (VIRT-CAPS) 1 MG CAPS TAKE 1 CAPSULE BY MOUTH EVERY DAY 9/20/21 Larissa Shows, MD   Calcium Acetate, Phos Binder, 667 MG CAPS TAKE 1 CAPSULE BY MOUTH THREE TIMES DAILY WITH MEALS 8/12/21 Larissa Shows, MD CLEMENTE KWIKPEN 100 UNIT/ML injection pen ADMINISTER 60 UNITS UNDER THE SKIN EVERY NIGHT 7/29/21 Larissa Shows, MD   albuterol (PROVENTIL) (2.5 MG/3ML) 0.083% nebulizer solution INHALE 1 VIAL VIA NEBULIZER EVERY 6 HOURS AS NEEDED FOR WHEEZING 6/2/21 Larissa Shows, MD   famotidine (PEPCID) 20 MG tablet TAKE 1 TABLET BY MOUTH TWICE DAILY AS NEEDED FOR HEARTBURN CATHETER REMOVAL performed by Fiorella Mckeon MD at [address] [place] [address]  2/29/2015    WNL    CORONARY ANGIOPLASTY WITH STENT PLACEMENT  05/26/15    CYST REMOVAL  08/14/2013    EXCISION CYSTS, NECK X2 AND ABDOMINAL benign    DIAGNOSTIC CARDIAC CATH LAB PROCEDURE      DIALYSIS FISTULA CREATION Left 10/30/2017    LEFT BRACHIAL CEPHALIC FISTULA    DIALYSIS FISTULA CREATION Left 3/27/2019    LIGATION  AV FISTULA performed by Ricardo Hammonds MD at [facility], COLON, DIAGNOSTIC      OTHER SURGICAL HISTORY  02/01/2017    laparoscopic cholecystectomy with intraoperative cholangiogram    OTHER SURGICAL HISTORY  2018    PORT PLACEMENT  - vas cath    OTHER SURGICAL HISTORY Bilateral 06/26/2018    laprascopic peritoneal dialysis catheter placement    OTHER SURGICAL HISTORY Right 09/2018    peritoneal dialysis port placed on right side of abdomen    OTHER SURGICAL HISTORY  05/28/2019    PTA/Stenting R External Iliac artery    CO LAP INSERTION TUNNELED INTRAPERITONEAL CATHETER N/A 9/21/2018    LAPAROSCOPIC PERITONEAL DIALYSIS CATHETER REPLACEMENT performed by Fiorella Mckeon MD at [address] [place] [address]  01/06/2016    UPPER GASTROINTESTINAL ENDOSCOPY  01/29/2017    possible candida, otherwise normal appearing    VASCULAR SURGERY  aprx 2 years ago    2 stents placed, each side of groin        Family History:    Family History   Problem Relation Age of Onset    Diabetes Mother     Heart Disease Father     Kidney Disease Sister         stage 4-kidney failure    Cancer Sister     Heart Disease Sister     Obesity Sister     Cancer Sister     Heart Disease Sister     Obesity Sister     Alcohol Abuse Brother        Social History     Socioeconomic History    Marital status:      Spouse name: Not on file    Number of children: Not on file    Years of education: Not on file    Highest education level: Not on file Occupational History    Not on file   Tobacco Use    Smoking status: Current Every Day Smoker     Packs/day: 0.50     Years: 33.00     Pack years: 16.50     Types: Cigarettes     Last attempt to quit: 2020     Years since quittin.5    Smokeless tobacco: Never Used   Vaping Use    Vaping Use: Never used   Substance and Sexual Activity    Alcohol use: Not Currently     Alcohol/week: 0.0 standard drinks     Comment: occ    Drug use: No    Sexual activity: Yes     Partners: Female     Comment:    Other Topics Concern    Not on file   Social History Narrative    Not on file     Social Determinants of Health     Financial Resource Strain:     Difficulty of Paying Living Expenses: Not on file   Food Insecurity:     Worried About Running Out of Food in the Last Year: Not on file    Louise of Food in the Last Year: Not on file   Transportation Needs:     Lack of Transportation (Medical): Not on file    Lack of Transportation (Non-Medical): Not on file   Physical Activity:     Days of Exercise per Week: Not on file    Minutes of Exercise per Session: Not on file   Stress:     Feeling of Stress : Not on file   Social Connections:     Frequency of Communication with Friends and Family: Not on file    Frequency of Social Gatherings with Friends and Family: Not on file    Attends Hindu Services: Not on file    Active Member of [address] [place] [address] [email or web address] or Organizations: Not on file    Attends Club or Organization Meetings: Not on file    Marital Status: Not on file   Intimate Partner Violence:     Fear of Current or Ex-Partner: Not on file    Emotionally Abused: Not on file    Physically Abused: Not on file    Sexually Abused: Not on file   Housing Stability:     Unable to Pay for Housing in the Last Year: Not on file    Number of [place] in the Last Year: Not on file    Unstable Housing in the Last Year: Not on file       Nursing notes reviewed.     ED Triage Vitals   Enc Vitals Group      BP 11/29/21 1412 (!) 175/107      Pulse 11/29/21 1412 97      Resp 11/29/21 1412 25      Temp 11/29/21 1416 97.5 °F (36.4 °C)      Temp Source 11/29/21 1416 Oral      SpO2 11/29/21 1412 95 %      Weight 11/29/21 1418 256 lb (116.1 kg)      Height 11/29/21 1418 5' 9\" (1.753 m)      Head Circumference --       Peak Flow --       Pain Score --       Pain Loc --       Pain Edu? --       Excl. in GC? --        GENERAL:  Awake, alert. Well developed, well nourished with mild respiratory distress. HENT:  Normocephalic, Atraumatic, moist mucous membranes. EYES:  Pupils equal round and reactive to light, Conjunctiva normal, extraocular movements normal.  NECK:  No meningeal signs, Supple. CHEST:  Regular rate and rhythm, chest wall non-tender. LUNGS:  Clear to auscultation bilaterally. ABDOMEN:  Soft, non-tender, no rebound, rigidity or guarding, slightly distended abdomen, normal bowel sounds. No costovertebral angle tenderness to palpation. BACK:  No tenderness. EXTREMITIES:  Normal range of motion, pitting bilateral lower extremity edema, no bony tenderness, no deformity, distal pulses present. SKIN: Warm, dry and intact. NEUROLOGIC: Normal mental status. Moving all extremities to command. LABS and DIAGNOSTIC RESULTS  EKG  The Ekg interpreted by me shows  normal sinus rhythm with a rate of 97  Axis is   Normal  QTc is  normal  Intervals and Durations are unremarkable. ST Segments: normal  Delta waves, Brugada Syndrome, and Short AL are not present. No significant change from prior EKG dated 11 oct 2021    RADIOLOGY  X-RAYS:  I have reviewed radiologic plain film image(s). ALL OTHER NON-PLAIN FILM IMAGES SUCH AS CT, ULTRASOUND AND MRI HAVE BEEN READ BY THE RADIOLOGIST. XR CHEST PORTABLE   Final Result   1. Pulmonary vascular congestion. 2. Left basilar opacity likely represents a combination of small pleural   fluid and atelectasis. Likely trace right pleural fluid.   Superimposed   infectious parenchymal process cannot be excluded.               LABS  Labs Reviewed   CBC WITH AUTO DIFFERENTIAL - Abnormal; Notable for the following components:       Result Value    WBC 11.2 (*)     RBC 3.45 (*)     Hemoglobin 10.0 (*)     Hematocrit 31.3 (*)     RDW 16.0 (*)     Neutrophils Absolute 9.6 (*)     Lymphocytes Absolute 0.6 (*)     All other components within normal limits    Narrative:     Performed at:  [address], [facility] [employer]   Phone [phone]   COMPREHENSIVE METABOLIC PANEL W/ REFLEX TO MG FOR LOW K - Abnormal; Notable for the following components:    Sodium 132 (*)     Potassium reflex Magnesium 5.2 (*)     Chloride 96 (*)     CO2 19 (*)     Anion Gap 17 (*)     Glucose 354 (*)     BUN 88 (*)     CREATININE 7.6 (*)     GFR Non- 8 (*)     GFR African American 9 (*)     Albumin/Globulin Ratio 1.0 (*)     AST 12 (*)     All other components within normal limits    Narrative:     Janet Vargas tel. [phone],  Chemistry results called to and read back by Dr Manuel Carlton, 11/29/2021 15:22, by  Luisa White  Performed at:  [address], [facility] [employer]   Phone [phone] - Abnormal; Notable for the following components:    Pro-BNP 39,503 (*)     All other components within normal limits    Narrative:     Janet Vargas tel. [phone],  Chemistry results called to and read back by Dr Manuel Carlton, 11/29/2021 15:22, by  Luisa White  Performed at:  [place] [address] [employer]   Phone [phone]   TROPONIN - Abnormal; Notable for the following components:    Troponin 0.12 (*)     All other components within normal limits    Narrative:     Janet Vargas tel. [phone],  Chemistry results called to and read back by Dr Manuel Carlton, 11/29/2021 15:22, by  Luisa White  Performed at:  [address] [place] [address] Laboratory  [address],  [place], [facility] [employer]   Phone [phone]   BLOOD GAS, ARTERIAL - Abnormal; Notable for the following components:    pH, Arterial 7.196 (*)     pCO2, Arterial 50.2 (*)     pO2, Arterial 55.9 (*)     HCO3, Arterial 19.0 (*)     Base Excess, Arterial -9.0 (*)     Hemoglobin, Art, Extended 11.7 (*)     O2 Sat, Arterial 83.9 (*)     All other components within normal limits    Narrative:     Jose Kim tel. [phone],  Chemistry results called to and read back by Barbara [facility], 11/29/2021  19:02, by Tip Loja  Performed at:  [address], [facility] [employer]   Phone [phone]   RAPID INFLUENZA A/B ANTIGENS   COVID-19, RAPID   LACTIC ACID, PLASMA   PROCALCITONIN   BLOOD GAS, ARTERIAL   RENAL [address] [facility]     ED Course as of 11/29/21 2156   Mon Nov 29, 2021   1541 Titrated from 4L to 2L with PO2 96% [MR]      ED Course User Index  [MR] Eliezer Suarez MD   The patient improved over the first few hours in the emergency department was able to titrate down from 4 L to 2 L even briefly on room air. However with ambulation on room air he desatted and was very dyspneic. For this reason I decided to admit the patient to the hospital.  I spoke with the on-call nephrologist, Dr. Milagro Carson, and we agreed that he did not need emergent dialysis at this time and could probably wait to the morning. Because of holding patients in the ER for lack of inpatient capacity patient was kept in the ER and slowly worsened and conversation between myself and the hospitalist we decided he did need dialysis and I sent out a repeat page to the nephrologist.  I have not heard back. Patient was transferred to ICU and worsened but stable condition in my opinion. He returned to his 4 L nasal cannula requirement that he had on arrival.    The total Critical Care time is 50 minutes which excludes separately billable procedures.   The critical care was concerning treatment of pulmonary edema and CHF with IV enalapril and transdermal nitroglycerin and supplemental oxygen for respiratory distress. This time is exclusive of any time documented by any other providers. I spoke with Dr. Bret Chacon. We thoroughly discussed the history, physical exam, laboratory and imaging studies, as well as, emergency department course. Based upon that discussion, we've decided to admit Yusef Parra for further observation and evaluation of Edda Ann's dyspnea. As I have deemed necessary from their history, physical, and studies, I have considered and evaluated Yusef Parra for the following diagnoses:  ACUTE CORONARY SYNDROME, CHRONIC OBSTRUCTIVE PULMONARY DISEASE, CONGESTIVE HEART FAILURE, PERICARDIAL TAMPONADE, PNEUMONIA, PNEUMOTHORAX, PULMONARY EMBOLISM, SEPSIS, and THORACIC DISSECTION. FINAL IMPRESSION  1. ESRD (end stage renal disease) on dialysis ([facility] [address]    2. Acute pulmonary edema (HCC)    3. Acute hypoxemic respiratory failure (HCC)        Vitals:  Blood pressure (!) 204/100, pulse 102, temperature 97.5 °F (36.4 °C), temperature source Oral, resp. rate 26, height 5' 9\" (1.753 m), weight 256 lb (116.1 kg), SpO2 91 %. Disposition  Pt is in stable condition upon Admit to CCU/ICU. This chart was generated using the [address] [place], [place] [address]  dictation system. I created this record but it may contain dictation errors.           Eduar Watkins MD  11/29/21 6579

## 2021-11-29 NOTE — H&P
Hypertension     MI (myocardial infarction) ([address] 2019    has had 9 MIs. 2019 was the last    Neuromuscular disorder ([address]     due to CVA    Numbness and tingling in left arm     from fistula    Pneumonia     PONV (postoperative nausea and vomiting)     Prolonged emergence from general anesthesia     States requires more medication than most people    Sleep apnea     Uses CPAP    Stroke ([address]     7mm thalamic cva 2017 deficts left side, left side weakness    TIA (transient ischemic attack)     Unspecified diseases of blood and blood-forming organs        Past Surgical History:          Procedure Laterality Date    AORTIC VALVE REPLACEMENT N/A 10/15/2019    TRANSCATHETER AORTIC VALVE REPLACEMENT FEMORAL APPROACH performed by Elsie Zendejas MD at [address] [place] [address] Right 7/2/2019    PERITONEAL DIALYSIS CATHETER REMOVAL performed by Loly Badillo MD at [facility] COLONOSCOPY  2/29/2015    WN    CORONARY ANGIOPLASTY WITH STENT PLACEMENT  05/26/15    CYST REMOVAL  08/14/2013    EXCISION CYSTS, NECK X2 AND ABDOMINAL benign    DIAGNOSTIC CARDIAC CATH LAB PROCEDURE      DIALYSIS FISTULA CREATION Left 10/30/2017    LEFT BRACHIAL CEPHALIC FISTULA    DIALYSIS FISTULA CREATION Left 3/27/2019    LIGATION  AV FISTULA performed by Sophia Child MD at [address] [place] [address], COLON, DIAGNOSTIC      OTHER SURGICAL HISTORY  02/01/2017    laparoscopic cholecystectomy with intraoperative cholangiogram    OTHER SURGICAL HISTORY  2018    PORT PLACEMENT  - vas cath    OTHER SURGICAL HISTORY Bilateral 06/26/2018    laprascopic peritoneal dialysis catheter placement    OTHER SURGICAL HISTORY Right 09/2018    peritoneal dialysis port placed on right side of abdomen    OTHER SURGICAL HISTORY  05/28/2019    PTA/Stenting R External Iliac artery    OH LAP INSERTION TUNNELED INTRAPERITONEAL CATHETER N/A 9/21/2018    LAPAROSCOPIC PERITONEAL DIALYSIS CATHETER REPLACEMENT performed by (DEXCOM G6 SENSOR) MISC Every 10 days 10/5/21   Moses An MD   Continuous Blood Gluc Transmit (DEXCOM G6 TRANSMITTER) MISC 1 each by Does not apply route every 3 months 10/5/21   Moses An MD   Continuous Blood Gluc  (DEXCOM G6 ) ADAM 1 each by Does not apply route Daily with lunch 10/5/21   Moses An MD   DULoxetine (CYMBALTA) 60 MG extended release capsule TAKE 1 CAPSULE BY MOUTH EVERY DAY 10/5/21   Moses An MD   traZODone (DESYREL) 150 MG tablet TAKE ONE (1) TABLET BY MOUTH NIGHTLY 9/29/21   Moses An MD   pravastatin (PRAVACHOL) 40 MG tablet TAKE 1 TABLET BY MOUTH EACH EVENING 9/29/21   Moses An MD   B Complex-C-Folic Acid (VIRT-CAPS) 1 MG CAPS TAKE 1 CAPSULE BY MOUTH EVERY DAY 9/20/21   Moses An MD   Calcium Acetate, Phos Binder, 667 MG CAPS TAKE 1 CAPSULE BY MOUTH THREE TIMES DAILY WITH MEALS 8/12/21   Moses An MD   BASAGLAR KWIKPEN 100 UNIT/ML injection pen ADMINISTER 60 UNITS UNDER THE SKIN EVERY NIGHT 7/29/21   Moses An MD   famotidine (PEPCID) 20 MG tablet TAKE 1 TABLET BY MOUTH TWICE DAILY AS NEEDED FOR HEARTBURN 5/28/21   Moses An MD   LINZESS 145 MCG capsule TAKE 1 CAPSULE BY MOUTH EVERY MORNING BEFORE BREAKFAST 5/25/21   Moses An MD   glucose monitoring kit (FREESTYLE) monitoring kit 1 kit by Does not apply route daily  Patient not taking: Reported on 10/5/2021 1/8/21   Moses An MD   blood glucose test strips (GLUCOSE METER TEST) strip 1 each by In Vitro route 5 times daily As needed. 1/8/21   Moses An MD   insulin aspart (NOVOLOG FLEXPEN) 100 UNIT/ML injection pen Inject 20 Units into the skin 3 times daily (before meals) 10/12/20   Moses An MD   blood glucose test strips (FREESTYLE LITE) strip Daily As needed.  8/19/19   Moses An MD   glucose monitoring kit (FREESTYLE) monitoring kit 1 kit by Does not apply route daily  Patient not taking: Reported on 10/5/2021 8/19/19   Moses An MD   vitamin D (ERGOCALCIFEROL) 77639 units CAPS capsule TK 1 C PO WEEKLY 6/2/19   Historical Provider, MD   Tiotropium Bromide-Olodaterol (STIOLTO RESPIMAT) 2.5-2.5 MCG/ACT AERS Inhale 2 puffs into the lungs daily 5/21/19   Aaron Torres MD   Polyethylene Glycol 3350 GRAN  5/2/18   Historical Provider, MD   Glucose Blood (BLOOD GLUCOSE TEST STRIPS) STRP TEST 3-4 TIMES DAILY, AS DIRECTED  Patient not taking: Reported on 10/5/2021 4/25/18   Roney Noble MD   Blood Glucose Monitoring Suppl ADAM USE AS DIRECTED. 4/25/18   Roney Noble MD   Alcohol Swabs PADS USE AS DIRECTED 4/25/18   Roney Noble MD   albuterol sulfate  (90 Base) MCG/ACT inhaler Inhale 2 puffs into the lungs every 6 hours as needed for Wheezing 11/8/17   Va Hernandez MD   ipratropium-albuterol (DUONEB) 0.5-2.5 (3) MG/3ML SOLN nebulizer solution Inhale 3 mLs into the lungs every 6 hours as needed for Shortness of Breath 10/15/17   Prudence MD [place]   calcium carbonate (TUMS) 500 MG chewable tablet Take 1 tablet by mouth 3 times daily as needed for Heartburn. Historical Provider, MD       Allergies:  Morphine    Social History:      The patient currently lives at home    TOBACCO:   reports that he has been smoking cigarettes. He has a 16.50 pack-year smoking history. He has never used smokeless tobacco.  ETOH:   reports previous alcohol use. E-Cigarettes/Vaping Use     Questions Responses    E-Cigarette/Vaping Use Never User    Start Date     Passive Exposure     Quit Date     Counseling Given     Comments             Family History:       Reviewed in detail and negative for DM, CAD, Cancer, CVA.  Positive as follows:        Problem Relation Age of Onset    Diabetes Mother     Heart Disease Father     Kidney Disease Sister         stage 4-kidney failure    Cancer Sister     Heart Disease Sister     Obesity Sister     Cancer Sister     Heart Disease Sister     Obesity Sister     Alcohol Abuse Brother        REVIEW OF SYSTEMS COMPLETED:   Pertinent positives as noted in the HPI. All other systems reviewed and negative. PHYSICAL EXAM PERFORMED:    /84   Pulse 88   Temp 97.5 °F (36.4 °C) (Oral)   Resp 19   Ht 5' 9\" (1.753 m)   Wt 256 lb (116.1 kg)   SpO2 97%   BMI 37.80 kg/m²     General appearance: Respiratory: Distress, appears stated age and cooperative. Morbidly obese  HEENT:  Normal cephalic, atraumatic without obvious deformity. .  Extra ocular muscles intact. Conjunctivae/corneas clear. Neck: Supple, with full range of motion. No jugular venous distention. Trachea midline. Respiratory:  Normal respiratory effort. Reduced air entry, bilaterally with Rales no Wheezes/Rhonchi. Cardiovascular:  Regular rate and rhythm with normal S1/S2 with murmurs,no  rubs or gallops. Abdomen: Soft, non-tender, non-distended with normal bowel sounds. Obese  Musculoskeletal:  No clubbing, cyanosis 1-2+ edema bilaterally. Full range of motion without deformity. Skin: Skin color, texture, turgor normal.  No rashes or lesions. Neurologic:  Neurovascularly intact without any focal sensory/motor deficits. Cranial   Psychiatric:  Alert and oriented, tt  Capillary Refill: Brisk,3 seconds, normal  Peripheral Pulses: +2 palpable, equal bilaterally       Labs:     Recent Labs     11/29/21  1426   WBC 11.2*   HGB 10.0*   HCT 31.3*        Recent Labs     11/29/21  1426   *   K 5.2*   CL 96*   CO2 19*   BUN 88*   CREATININE 7.6*   CALCIUM 8.5     Recent Labs     11/29/21  1426   AST 12*   ALT 16   BILITOT <0.2   ALKPHOS 128     No results for input(s): INR in the last 72 hours.   Recent Labs     11/29/21  1426   TROPONINI 0.12*       Urinalysis:      Lab Results   Component Value Date    NITRU Negative 09/07/2021    WBCUA 10-20 09/07/2021    BACTERIA 1+ 09/07/2021    RBCUA 3-4 09/07/2021    BLOODU TRACE-LYSED 09/07/2021    SPECGRAV 1.015 09/07/2021    GLUCOSEU 100 09/07/2021       Radiology:     CXR: I have reviewed the CXR with the following interpretation:   EKG:  I have reviewed the EKG with the following interpretation:   Normal sinus rhythmNormal ECGWhen compared with ECG of 11-OCT-2021 14:17,Questionable change in QRS axisNonspecific T wave abnormality now evident in Inferior leadsT wave inversion no longer evident in Lateral leadsConfirmed by Roger Miles MD, [facility]     XR CHEST PORTABLE   Final Result   1. Pulmonary vascular congestion. 2. Left basilar opacity likely represents a combination of small pleural   fluid and atelectasis. Likely trace right pleural fluid. Superimposed   infectious parenchymal process cannot be excluded.              ASSESSMENT:    Active Hospital Problems    Diagnosis Date Noted    CHF (congestive heart failure), NYHA class I, acute on chronic, combined ([facility] [I50.43] 11/29/2021         PLAN:    Acute pulmonary edema-admit, patient was started on IV Lasix but patient did not pass urine, worsening respiratory status, nephrology evaluation for possible emergency hemodialysis, telemetry, troponin, recent echocardiogram showed ejection fraction of 93% and diastolic dysfunction grade 2    Acute and chronic combined systolic and diastolic CHF-management as above, lisinopril, consider cardiology evaluation if necessary    Acute hypoxic respiratory failure-secondary to above,     Anion gap acidosis/metabolic acidosis-possibly secondary to renal insufficiency, sepsis cannot be ruled out, DKA less likely lactic acid pending, empirical antibiotic for possible pneumonia, procalcitonin,, Covid test, rapid flu, UA, beta hydroxybutyric acid    Diabetes-blood sugar is not controlled-monitor blood sugar with low correction scale insulin  He  takes Basaglar 60 units at home-hold and start 15 units of Lantus nightly      Elevated troponin-secondary to ESRD but flat, trend    ESRD on hemodialysis-gets dialysis Monday Wednesday Friday and today he did not complete his dialysis-nephrology eval, ER physicians awaiting call from nephrology    Morbid obesity-need counseling    Mild hyperkalemia-monitor with dialysis    Aortic stenosis    ZAINAB-continue CPAP if uses at home    COPD-no exacerbation    Remote history of DVT      DVT Prophylaxis: Heparin  Diet: Low-carb  Code Status: Full code  PT/OT Eval Status:     Dispo -admitted to ICU-  DW nephrologist   emergency Dialysis planned , orders placed by Nephro  Spent 46 mts of CC time        Lupis Chris MD    Thank you Harvinder Griffith MD for the opportunity to be involved in this patient's care. If you have any questions or concerns please feel free to contact me at [phone].

## 2021-11-29 NOTE — Clinical Note
Patient Class: Inpatient [101]   REQUIRED: Diagnosis: CHF (congestive heart failure), NYHA class I, acute on chronic, combined ([facility] [[id number]]   Estimated Length of Stay: Estimated stay of more than 2 midnights

## 2021-11-30 ENCOUNTER — APPOINTMENT (OUTPATIENT)
Dept: GENERAL RADIOLOGY | Age: 51
DRG: 208 | End: 2021-11-30
Payer: COMMERCIAL

## 2021-11-30 PROBLEM — E66.9 OBESITY, CLASS II, BMI 35-39.9: Status: ACTIVE | Noted: 2021-11-30

## 2021-11-30 PROBLEM — I51.89 GRADE II DIASTOLIC DYSFUNCTION: Status: ACTIVE | Noted: 2021-11-30

## 2021-11-30 PROBLEM — F17.200 SMOKER: Status: ACTIVE | Noted: 2021-11-30

## 2021-11-30 PROBLEM — E66.812 OBESITY, CLASS II, BMI 35-39.9: Status: ACTIVE | Noted: 2021-11-30

## 2021-11-30 PROBLEM — J81.0 ACUTE PULMONARY EDEMA (HCC): Status: ACTIVE | Noted: 2021-11-30

## 2021-11-30 PROBLEM — R57.9 SHOCK CIRCULATORY (HCC): Status: ACTIVE | Noted: 2021-11-30

## 2021-11-30 PROBLEM — D64.9 NORMOCYTIC NORMOCHROMIC ANEMIA: Status: ACTIVE | Noted: 2021-11-30

## 2021-11-30 PROBLEM — J96.02 ACUTE RESPIRATORY FAILURE WITH HYPERCAPNIA (HCC): Status: ACTIVE | Noted: 2021-11-30

## 2021-11-30 LAB
ALBUMIN SERPL-MCNC: 3.4 G/DL (ref 3.4–5)
ANION GAP SERPL CALCULATED.3IONS-SCNC: 18 MMOL/L (ref 3–16)
BASE EXCESS ARTERIAL: -11.1 MMOL/L (ref -3–3)
BASE EXCESS ARTERIAL: -5 (ref -3–3)
BASE EXCESS ARTERIAL: -6 (ref -3–3)
BASE EXCESS ARTERIAL: -7 MMOL/L (ref -3–3)
BASE EXCESS ARTERIAL: -8 (ref -3–3)
BASE EXCESS ARTERIAL: -8 MMOL/L (ref -3–3)
BASOPHILS ABSOLUTE: 0 K/UL (ref 0–0.2)
BASOPHILS RELATIVE PERCENT: 0.1 %
BUN BLDV-MCNC: 100 MG/DL (ref 7–20)
CALCIUM IONIZED: 1.13 MMOL/L (ref 1.12–1.32)
CALCIUM IONIZED: 1.14 MMOL/L (ref 1.12–1.32)
CALCIUM IONIZED: 1.2 MMOL/L (ref 1.12–1.32)
CALCIUM SERPL-MCNC: 8.5 MG/DL (ref 8.3–10.6)
CARBOXYHEMOGLOBIN ARTERIAL: 0.2 % (ref 0–1.5)
CARBOXYHEMOGLOBIN ARTERIAL: 0.2 % (ref 0–1.5)
CARBOXYHEMOGLOBIN ARTERIAL: 0.6 % (ref 0–1.5)
CHLORIDE BLD-SCNC: 94 MMOL/L (ref 99–110)
CHOLESTEROL, TOTAL: 251 MG/DL (ref 0–199)
CO2: 17 MMOL/L (ref 21–32)
CREAT SERPL-MCNC: 7.3 MG/DL (ref 0.9–1.3)
EOSINOPHILS ABSOLUTE: 0 K/UL (ref 0–0.6)
EOSINOPHILS RELATIVE PERCENT: 0 %
ESTIMATED AVERAGE GLUCOSE: 231.7 MG/DL
GFR AFRICAN AMERICAN: 10
GFR NON-AFRICAN AMERICAN: 8
GLUCOSE BLD-MCNC: 101 MG/DL (ref 70–99)
GLUCOSE BLD-MCNC: 140 MG/DL (ref 70–99)
GLUCOSE BLD-MCNC: 209 MG/DL (ref 70–99)
GLUCOSE BLD-MCNC: 397 MG/DL (ref 70–99)
GLUCOSE BLD-MCNC: 424 MG/DL (ref 70–99)
GLUCOSE BLD-MCNC: 463 MG/DL (ref 70–99)
GLUCOSE BLD-MCNC: 473 MG/DL (ref 70–99)
GLUCOSE BLD-MCNC: 480 MG/DL (ref 70–99)
GLUCOSE BLD-MCNC: 96 MG/DL (ref 70–99)
HBA1C MFR BLD: 9.7 %
HCO3 ARTERIAL: 21.8 MMOL/L (ref 21–29)
HCO3 ARTERIAL: 21.9 MMOL/L (ref 21–29)
HCO3 ARTERIAL: 22 MMOL/L (ref 21–29)
HCO3 ARTERIAL: 22.6 MMOL/L (ref 21–29)
HCO3 ARTERIAL: 23.7 MMOL/L (ref 21–29)
HCO3 ARTERIAL: 24 MMOL/L (ref 21–29)
HCT VFR BLD CALC: 33.2 % (ref 40.5–52.5)
HDLC SERPL-MCNC: 53 MG/DL (ref 40–60)
HEMOGLOBIN, ART, EXTENDED: 11.2 G/DL (ref 13.5–17.5)
HEMOGLOBIN, ART, EXTENDED: 11.6 G/DL (ref 13.5–17.5)
HEMOGLOBIN, ART, EXTENDED: 12.6 G/DL (ref 13.5–17.5)
HEMOGLOBIN: 10.1 GM/DL (ref 13.5–17.5)
HEMOGLOBIN: 10.2 GM/DL (ref 13.5–17.5)
HEMOGLOBIN: 10.6 G/DL (ref 13.5–17.5)
HEMOGLOBIN: 13 GM/DL (ref 13.5–17.5)
LACTATE: 0.8 MMOL/L (ref 0.4–2)
LACTATE: 0.86 MMOL/L (ref 0.4–2)
LACTATE: 1.55 MMOL/L (ref 0.4–2)
LDL CHOLESTEROL CALCULATED: 155 MG/DL
LYMPHOCYTES ABSOLUTE: 0.4 K/UL (ref 1–5.1)
LYMPHOCYTES RELATIVE PERCENT: 2.7 %
MAGNESIUM: 2.4 MG/DL (ref 1.8–2.4)
MCH RBC QN AUTO: 29.3 PG (ref 26–34)
MCHC RBC AUTO-ENTMCNC: 32 G/DL (ref 31–36)
MCV RBC AUTO: 91.6 FL (ref 80–100)
METHEMOGLOBIN ARTERIAL: 0.2 %
METHEMOGLOBIN ARTERIAL: 0.4 %
METHEMOGLOBIN ARTERIAL: 0.6 %
MONOCYTES ABSOLUTE: 0.5 K/UL (ref 0–1.3)
MONOCYTES RELATIVE PERCENT: 3.7 %
NEUTROPHILS ABSOLUTE: 12.7 K/UL (ref 1.7–7.7)
NEUTROPHILS RELATIVE PERCENT: 93.5 %
O2 SAT, ARTERIAL: 60.4 %
O2 SAT, ARTERIAL: 86 % (ref 93–100)
O2 SAT, ARTERIAL: 92.9 %
O2 SAT, ARTERIAL: 95.4 %
O2 SAT, ARTERIAL: 96 % (ref 93–100)
O2 SAT, ARTERIAL: 97 % (ref 93–100)
O2 THERAPY: ABNORMAL
PCO2 ARTERIAL: 107 MM HG (ref 35–45)
PCO2 ARTERIAL: 52.4 MM HG (ref 35–45)
PCO2 ARTERIAL: 53.4 MM HG (ref 35–45)
PCO2 ARTERIAL: 66.3 MMHG (ref 35–45)
PCO2 ARTERIAL: 82.5 MMHG (ref 35–45)
PCO2 ARTERIAL: 97.9 MMHG (ref 35–45)
PDW BLD-RTO: 16 % (ref 12.4–15.4)
PERFORMED ON: ABNORMAL
PERFORMED ON: NORMAL
PH ARTERIAL: 6.95 (ref 7.35–7.45)
PH ARTERIAL: 6.97 (ref 7.35–7.45)
PH ARTERIAL: 7.08 (ref 7.35–7.45)
PH ARTERIAL: 7.14 (ref 7.35–7.45)
PH ARTERIAL: 7.22 (ref 7.35–7.45)
PH ARTERIAL: 7.24 (ref 7.35–7.45)
PHOSPHORUS: 7.9 MG/DL (ref 2.5–4.9)
PLATELET # BLD: 219 K/UL (ref 135–450)
PMV BLD AUTO: 8.4 FL (ref 5–10.5)
PO2 ARTERIAL: 112 MM HG (ref 75–108)
PO2 ARTERIAL: 36.7 MMHG (ref 75–108)
PO2 ARTERIAL: 83.7 MM HG (ref 75–108)
PO2 ARTERIAL: 89.5 MMHG (ref 75–108)
PO2 ARTERIAL: 93.8 MM HG (ref 75–108)
PO2 ARTERIAL: 94.2 MMHG (ref 75–108)
POC FIO2: 100
POC HEMATOCRIT: 30 % (ref 40.5–52.5)
POC HEMATOCRIT: 30 % (ref 40.5–52.5)
POC HEMATOCRIT: 38 % (ref 40.5–52.5)
POC POTASSIUM: 5.2 MMOL/L (ref 3.5–5.1)
POC POTASSIUM: 5.8 MMOL/L (ref 3.5–5.1)
POC POTASSIUM: 6.2 MMOL/L (ref 3.5–5.1)
POC SAMPLE TYPE: ABNORMAL
POC SODIUM: 130 MMOL/L (ref 136–145)
POC SODIUM: 132 MMOL/L (ref 136–145)
POC SODIUM: 133 MMOL/L (ref 136–145)
POTASSIUM SERPL-SCNC: 6.7 MMOL/L (ref 3.5–5.1)
POTASSIUM SERPL-SCNC: 7.7 MMOL/L (ref 3.5–5.1)
RBC # BLD: 3.62 M/UL (ref 4.2–5.9)
SARS-COV-2, NAAT: NOT DETECTED
SODIUM BLD-SCNC: 129 MMOL/L (ref 136–145)
TCO2 ARTERIAL: 23 MMOL/L
TCO2 ARTERIAL: 23.9 MMOL/L
TCO2 ARTERIAL: 24 MMOL/L
TCO2 ARTERIAL: 25 MMOL/L
TCO2 ARTERIAL: 26.6 MMOL/L
TCO2 ARTERIAL: 27 MMOL/L
TRIGL SERPL-MCNC: 214 MG/DL (ref 0–150)
TROPONIN: 0.08 NG/ML
TROPONIN: 0.09 NG/ML
TSH SERPL DL<=0.05 MIU/L-ACNC: 1.17 UIU/ML (ref 0.27–4.2)
VLDLC SERPL CALC-MCNC: 43 MG/DL
WBC # BLD: 13.6 K/UL (ref 4–11)

## 2021-11-30 PROCEDURE — 0BH18EZ INSERTION OF ENDOTRACHEAL AIRWAY INTO TRACHEA, VIA NATURAL OR ARTIFICIAL OPENING ENDOSCOPIC: ICD-10-PCS | Performed by: INTERNAL MEDICINE

## 2021-11-30 PROCEDURE — 6360000002 HC RX W HCPCS: Performed by: INTERNAL MEDICINE

## 2021-11-30 PROCEDURE — 6370000000 HC RX 637 (ALT 250 FOR IP): Performed by: NURSE PRACTITIONER

## 2021-11-30 PROCEDURE — 6370000000 HC RX 637 (ALT 250 FOR IP): Performed by: INTERNAL MEDICINE

## 2021-11-30 PROCEDURE — 83036 HEMOGLOBIN GLYCOSYLATED A1C: CPT

## 2021-11-30 PROCEDURE — 71045 X-RAY EXAM CHEST 1 VIEW: CPT

## 2021-11-30 PROCEDURE — 2500000003 HC RX 250 WO HCPCS: Performed by: NURSE PRACTITIONER

## 2021-11-30 PROCEDURE — 74018 RADEX ABDOMEN 1 VIEW: CPT

## 2021-11-30 PROCEDURE — 83735 ASSAY OF MAGNESIUM: CPT

## 2021-11-30 PROCEDURE — 94640 AIRWAY INHALATION TREATMENT: CPT

## 2021-11-30 PROCEDURE — 80061 LIPID PANEL: CPT

## 2021-11-30 PROCEDURE — 90935 HEMODIALYSIS ONE EVALUATION: CPT

## 2021-11-30 PROCEDURE — 2700000000 HC OXYGEN THERAPY PER DAY

## 2021-11-30 PROCEDURE — 2500000003 HC RX 250 WO HCPCS: Performed by: INTERNAL MEDICINE

## 2021-11-30 PROCEDURE — P9047 ALBUMIN (HUMAN), 25%, 50ML: HCPCS | Performed by: INTERNAL MEDICINE

## 2021-11-30 PROCEDURE — 51702 INSERT TEMP BLADDER CATH: CPT

## 2021-11-30 PROCEDURE — 99291 CRITICAL CARE FIRST HOUR: CPT | Performed by: INTERNAL MEDICINE

## 2021-11-30 PROCEDURE — P9047 ALBUMIN (HUMAN), 25%, 50ML: HCPCS

## 2021-11-30 PROCEDURE — 2580000003 HC RX 258: Performed by: NURSE PRACTITIONER

## 2021-11-30 PROCEDURE — 85014 HEMATOCRIT: CPT

## 2021-11-30 PROCEDURE — 80069 RENAL FUNCTION PANEL: CPT

## 2021-11-30 PROCEDURE — 84295 ASSAY OF SERUM SODIUM: CPT

## 2021-11-30 PROCEDURE — 84484 ASSAY OF TROPONIN QUANT: CPT

## 2021-11-30 PROCEDURE — 6360000002 HC RX W HCPCS

## 2021-11-30 PROCEDURE — 84132 ASSAY OF SERUM POTASSIUM: CPT

## 2021-11-30 PROCEDURE — 94761 N-INVAS EAR/PLS OXIMETRY MLT: CPT

## 2021-11-30 PROCEDURE — 2060000000 HC ICU INTERMEDIATE R&B

## 2021-11-30 PROCEDURE — 31500 INSERT EMERGENCY AIRWAY: CPT

## 2021-11-30 PROCEDURE — 82947 ASSAY GLUCOSE BLOOD QUANT: CPT

## 2021-11-30 PROCEDURE — 6360000002 HC RX W HCPCS: Performed by: NURSE PRACTITIONER

## 2021-11-30 PROCEDURE — 85025 COMPLETE CBC W/AUTO DIFF WBC: CPT

## 2021-11-30 PROCEDURE — 94002 VENT MGMT INPAT INIT DAY: CPT

## 2021-11-30 PROCEDURE — 36415 COLL VENOUS BLD VENIPUNCTURE: CPT

## 2021-11-30 PROCEDURE — 84443 ASSAY THYROID STIM HORMONE: CPT

## 2021-11-30 PROCEDURE — 2580000003 HC RX 258: Performed by: INTERNAL MEDICINE

## 2021-11-30 PROCEDURE — 5A1945Z RESPIRATORY VENTILATION, 24-96 CONSECUTIVE HOURS: ICD-10-PCS | Performed by: INTERNAL MEDICINE

## 2021-11-30 PROCEDURE — 83605 ASSAY OF LACTIC ACID: CPT

## 2021-11-30 PROCEDURE — 82330 ASSAY OF CALCIUM: CPT

## 2021-11-30 PROCEDURE — 82803 BLOOD GASES ANY COMBINATION: CPT

## 2021-11-30 RX ORDER — ALBUTEROL SULFATE 2.5 MG/3ML
5 SOLUTION RESPIRATORY (INHALATION) EVERY 6 HOURS PRN
Status: DISCONTINUED | OUTPATIENT
Start: 2021-11-30 | End: 2021-11-30

## 2021-11-30 RX ORDER — CALCIUM GLUCONATE 20 MG/ML
1000 INJECTION, SOLUTION INTRAVENOUS
Status: COMPLETED | OUTPATIENT
Start: 2021-11-30 | End: 2021-11-30

## 2021-11-30 RX ORDER — ATROPINE SULFATE 0.1 MG/ML
INJECTION INTRAVENOUS
Status: COMPLETED
Start: 2021-11-30 | End: 2021-11-30

## 2021-11-30 RX ORDER — CHLORHEXIDINE GLUCONATE 0.12 MG/ML
15 RINSE ORAL 2 TIMES DAILY
Status: DISCONTINUED | OUTPATIENT
Start: 2021-11-30 | End: 2021-12-01

## 2021-11-30 RX ORDER — PROPOFOL 10 MG/ML
INJECTION, EMULSION INTRAVENOUS
Status: COMPLETED
Start: 2021-11-30 | End: 2021-11-30

## 2021-11-30 RX ORDER — HEPARIN SODIUM 1000 [USP'U]/ML
4000 INJECTION, SOLUTION INTRAVENOUS; SUBCUTANEOUS PRN
Status: DISCONTINUED | OUTPATIENT
Start: 2021-11-30 | End: 2021-12-03 | Stop reason: HOSPADM

## 2021-11-30 RX ORDER — PROPOFOL 10 MG/ML
5-50 INJECTION, EMULSION INTRAVENOUS
Status: DISCONTINUED | OUTPATIENT
Start: 2021-11-30 | End: 2021-12-01

## 2021-11-30 RX ORDER — CALCIUM GLUCONATE 20 MG/ML
1000 INJECTION, SOLUTION INTRAVENOUS ONCE
Status: COMPLETED | OUTPATIENT
Start: 2021-11-30 | End: 2021-11-30

## 2021-11-30 RX ORDER — ALBUMIN (HUMAN) 12.5 G/50ML
SOLUTION INTRAVENOUS
Status: COMPLETED
Start: 2021-11-30 | End: 2021-11-30

## 2021-11-30 RX ORDER — PROPOFOL 10 MG/ML
5-50 INJECTION, EMULSION INTRAVENOUS
Status: DISCONTINUED | OUTPATIENT
Start: 2021-11-30 | End: 2021-11-30

## 2021-11-30 RX ORDER — ATROPINE SULFATE 0.1 MG/ML
0.5 INJECTION INTRAVENOUS
Status: DISCONTINUED | OUTPATIENT
Start: 2021-11-30 | End: 2021-12-03 | Stop reason: HOSPADM

## 2021-11-30 RX ORDER — DEXTROSE MONOHYDRATE 25 G/50ML
25 INJECTION, SOLUTION INTRAVENOUS ONCE
Status: COMPLETED | OUTPATIENT
Start: 2021-11-30 | End: 2021-11-30

## 2021-11-30 RX ORDER — CALCIUM GLUCONATE 94 MG/ML
2000 INJECTION, SOLUTION INTRAVENOUS ONCE
Status: DISCONTINUED | OUTPATIENT
Start: 2021-11-30 | End: 2021-11-30

## 2021-11-30 RX ORDER — HEPARIN SODIUM 1000 [USP'U]/ML
INJECTION, SOLUTION INTRAVENOUS; SUBCUTANEOUS
Status: DISPENSED
Start: 2021-11-30 | End: 2021-11-30

## 2021-11-30 RX ORDER — DEXTROSE MONOHYDRATE 25 G/50ML
12.5 INJECTION, SOLUTION INTRAVENOUS ONCE
Status: DISCONTINUED | OUTPATIENT
Start: 2021-11-30 | End: 2021-12-03 | Stop reason: HOSPADM

## 2021-11-30 RX ORDER — ALBUMIN (HUMAN) 12.5 G/50ML
12.5 SOLUTION INTRAVENOUS PRN
Status: DISCONTINUED | OUTPATIENT
Start: 2021-11-30 | End: 2021-12-03 | Stop reason: HOSPADM

## 2021-11-30 RX ORDER — SODIUM POLYSTYRENE SULFONATE 15 G/60ML
15 SUSPENSION ORAL; RECTAL ONCE
Status: COMPLETED | OUTPATIENT
Start: 2021-11-30 | End: 2021-11-30

## 2021-11-30 RX ORDER — IPRATROPIUM BROMIDE AND ALBUTEROL SULFATE 2.5; .5 MG/3ML; MG/3ML
1 SOLUTION RESPIRATORY (INHALATION)
Status: DISCONTINUED | OUTPATIENT
Start: 2021-11-30 | End: 2021-12-03 | Stop reason: HOSPADM

## 2021-11-30 RX ORDER — CARBOXYMETHYLCELLULOSE SODIUM 10 MG/ML
1 GEL OPHTHALMIC
Status: DISCONTINUED | OUTPATIENT
Start: 2021-11-30 | End: 2021-12-01

## 2021-11-30 RX ORDER — INSULIN GLARGINE 100 [IU]/ML
20 INJECTION, SOLUTION SUBCUTANEOUS ONCE
Status: COMPLETED | OUTPATIENT
Start: 2021-11-30 | End: 2021-11-30

## 2021-11-30 RX ADMIN — SODIUM POLYSTYRENE SULFONATE 15 G: 15 SUSPENSION ORAL; RECTAL at 04:24

## 2021-11-30 RX ADMIN — DOCUSATE SODIUM 100 MG: 50 LIQUID ORAL at 21:01

## 2021-11-30 RX ADMIN — GABAPENTIN 100 MG: 100 CAPSULE ORAL at 09:35

## 2021-11-30 RX ADMIN — ALBUMIN (HUMAN) 12.5 G: 0.25 INJECTION, SOLUTION INTRAVENOUS at 09:36

## 2021-11-30 RX ADMIN — HEPARIN SODIUM 4100 UNITS: 1000 INJECTION INTRAVENOUS; SUBCUTANEOUS at 12:15

## 2021-11-30 RX ADMIN — HEPARIN SODIUM 4000 UNITS: 1000 INJECTION INTRAVENOUS; SUBCUTANEOUS at 12:42

## 2021-11-30 RX ADMIN — INSULIN LISPRO 18 UNITS: 100 INJECTION, SOLUTION INTRAVENOUS; SUBCUTANEOUS at 08:59

## 2021-11-30 RX ADMIN — ALTEPLASE 4 MG: 2.2 INJECTION, POWDER, LYOPHILIZED, FOR SOLUTION INTRAVENOUS at 02:39

## 2021-11-30 RX ADMIN — INSULIN HUMAN 10 UNITS: 100 INJECTION, SOLUTION PARENTERAL at 04:25

## 2021-11-30 RX ADMIN — MUPIROCIN: 20 OINTMENT TOPICAL at 21:00

## 2021-11-30 RX ADMIN — FENTANYL CITRATE 100 MCG/HR: 50 INJECTION, SOLUTION INTRAMUSCULAR; INTRAVENOUS at 11:53

## 2021-11-30 RX ADMIN — IPRATROPIUM BROMIDE AND ALBUTEROL SULFATE 1 AMPULE: .5; 3 SOLUTION RESPIRATORY (INHALATION) at 11:42

## 2021-11-30 RX ADMIN — IPRATROPIUM BROMIDE AND ALBUTEROL SULFATE 1 AMPULE: .5; 3 SOLUTION RESPIRATORY (INHALATION) at 16:35

## 2021-11-30 RX ADMIN — SODIUM BICARBONATE 50 MEQ: 84 INJECTION, SOLUTION INTRAVENOUS at 04:29

## 2021-11-30 RX ADMIN — CALCIUM GLUCONATE 1000 MG: 20 INJECTION, SOLUTION INTRAVENOUS at 07:58

## 2021-11-30 RX ADMIN — HEPARIN SODIUM 5000 UNITS: 5000 INJECTION INTRAVENOUS; SUBCUTANEOUS at 21:01

## 2021-11-30 RX ADMIN — HEPARIN SODIUM 5000 UNITS: 5000 INJECTION INTRAVENOUS; SUBCUTANEOUS at 14:07

## 2021-11-30 RX ADMIN — CALCIUM GLUCONATE 1000 MG: 20 INJECTION, SOLUTION INTRAVENOUS at 04:04

## 2021-11-30 RX ADMIN — DEXTROSE MONOHYDRATE 2 MCG/MIN: 50 INJECTION, SOLUTION INTRAVENOUS at 11:47

## 2021-11-30 RX ADMIN — SODIUM CHLORIDE, PRESERVATIVE FREE 10 ML: 5 INJECTION INTRAVENOUS at 09:01

## 2021-11-30 RX ADMIN — CLOPIDOGREL BISULFATE 75 MG: 75 TABLET ORAL at 09:35

## 2021-11-30 RX ADMIN — INSULIN HUMAN 10 UNITS: 100 INJECTION, SOLUTION PARENTERAL at 08:03

## 2021-11-30 RX ADMIN — GABAPENTIN 100 MG: 100 CAPSULE ORAL at 21:01

## 2021-11-30 RX ADMIN — PROPOFOL 20 MCG/KG/MIN: 10 INJECTION, EMULSION INTRAVENOUS at 01:30

## 2021-11-30 RX ADMIN — QUETIAPINE FUMARATE 50 MG: 25 TABLET ORAL at 21:01

## 2021-11-30 RX ADMIN — Medication 15 ML: at 20:00

## 2021-11-30 RX ADMIN — MUPIROCIN: 20 OINTMENT TOPICAL at 09:01

## 2021-11-30 RX ADMIN — SODIUM ZIRCONIUM CYCLOSILICATE 10 G: 5 POWDER, FOR SUSPENSION ORAL at 08:47

## 2021-11-30 RX ADMIN — DEXTROSE MONOHYDRATE 25 G: 25 INJECTION, SOLUTION INTRAVENOUS at 04:29

## 2021-11-30 RX ADMIN — Medication 15 ML: at 09:37

## 2021-11-30 RX ADMIN — CARBOXYMETHYLCELLULOSE SODIUM 1 DROP: 10 GEL OPHTHALMIC at 12:12

## 2021-11-30 RX ADMIN — ALBUMIN (HUMAN) 12.5 G: 0.25 INJECTION, SOLUTION INTRAVENOUS at 10:03

## 2021-11-30 RX ADMIN — GABAPENTIN 100 MG: 100 CAPSULE ORAL at 14:06

## 2021-11-30 RX ADMIN — FAMOTIDINE 20 MG: 20 TABLET ORAL at 09:35

## 2021-11-30 RX ADMIN — CALCIUM GLUCONATE 1000 MG: 20 INJECTION, SOLUTION INTRAVENOUS at 09:03

## 2021-11-30 RX ADMIN — IPRATROPIUM BROMIDE AND ALBUTEROL SULFATE 1 AMPULE: .5; 3 SOLUTION RESPIRATORY (INHALATION) at 20:16

## 2021-11-30 RX ADMIN — INSULIN GLARGINE 20 UNITS: 100 INJECTION, SOLUTION SUBCUTANEOUS at 21:37

## 2021-11-30 RX ADMIN — DOCUSATE SODIUM 100 MG: 50 LIQUID ORAL at 09:37

## 2021-11-30 RX ADMIN — INSULIN LISPRO 3 UNITS: 100 INJECTION, SOLUTION INTRAVENOUS; SUBCUTANEOUS at 12:10

## 2021-11-30 RX ADMIN — HEPARIN SODIUM 5000 UNITS: 5000 INJECTION INTRAVENOUS; SUBCUTANEOUS at 05:26

## 2021-11-30 RX ADMIN — CARBOXYMETHYLCELLULOSE SODIUM 1 DROP: 10 GEL OPHTHALMIC at 19:57

## 2021-11-30 RX ADMIN — ATROPINE SULFATE 0.5 MG: 0.1 INJECTION INTRAVENOUS at 03:24

## 2021-11-30 RX ADMIN — FENTANYL CITRATE 125 MCG/HR: 50 INJECTION, SOLUTION INTRAMUSCULAR; INTRAVENOUS at 21:30

## 2021-11-30 RX ADMIN — CARBOXYMETHYLCELLULOSE SODIUM 1 DROP: 10 GEL OPHTHALMIC at 16:16

## 2021-11-30 RX ADMIN — PROPOFOL 10 MCG/KG/MIN: 10 INJECTION, EMULSION INTRAVENOUS at 07:47

## 2021-11-30 RX ADMIN — PRAVASTATIN SODIUM 40 MG: 40 TABLET ORAL at 21:01

## 2021-11-30 RX ADMIN — FENTANYL CITRATE 25 MCG/HR: 50 INJECTION, SOLUTION INTRAMUSCULAR; INTRAVENOUS at 01:44

## 2021-11-30 ASSESSMENT — PULMONARY FUNCTION TESTS
PIF_VALUE: 42
PIF_VALUE: 42
PIF_VALUE: 20
PIF_VALUE: 22
PIF_VALUE: 42
PIF_VALUE: 42
PIF_VALUE: 24
PIF_VALUE: 26
PIF_VALUE: 42
PIF_VALUE: 29
PIF_VALUE: 21
PIF_VALUE: 23
PIF_VALUE: 38
PIF_VALUE: 23
PIF_VALUE: 20
PIF_VALUE: 20
PIF_VALUE: 42
PIF_VALUE: 19
PIF_VALUE: 21
PIF_VALUE: 42
PIF_VALUE: 19
PIF_VALUE: 42
PIF_VALUE: 20
PIF_VALUE: 42
PIF_VALUE: 20
PIF_VALUE: 42
PIF_VALUE: 30
PIF_VALUE: 20
PIF_VALUE: 20
PIF_VALUE: 21
PIF_VALUE: 26
PIF_VALUE: 21
PIF_VALUE: 18
PIF_VALUE: 22
PIF_VALUE: 22
PIF_VALUE: 21
PIF_VALUE: 19
PIF_VALUE: 21
PIF_VALUE: 20
PIF_VALUE: 24
PIF_VALUE: 37
PIF_VALUE: 21
PIF_VALUE: 23

## 2021-11-30 ASSESSMENT — PAIN SCALES - GENERAL
PAINLEVEL_OUTOF10: 0

## 2021-11-30 NOTE — PROGRESS NOTES
0055: Pt did not tolerate being in bed for Dialysis. Pt was getting out of the bed to sit on the edge due to having trouble breathing. Paged Dr. Rossana Thomas for orders for Ativan and Bipap.     0104: Paged Dr. Rossana Thomas again. Pt desated to the 76s. Put nonrebreather on pt, sats slowly coming up. Still awaiting response. While awaiting response/orders pt became obtunded and was unresponsive. 0112: Paged Dr. Rossana Thomas to come to bedside. 0120: Obtained blood gas: ph 6.95, pCO2 107.     0122: Dr Rossana Thomas at bedside. 20 Etomidate and 70 of Gm given IV. #8 tube secured at 24cm at lips. Pt tolerated well.

## 2021-11-30 NOTE — CONSULTS
[facility] Consult Note           Reason for Consult:  End stage renal disease  Requesting Physician:  Dr. Abiola Astudillo    Chief Complaint:  Shortness of breath  History Obtained From:  patient, electronic medical record    History of Present Ilness:    46year old male with ESRD on HD MWF admitted with shortness of breath. We have been asked to assist in further dialysis care. Was at HD yesterday and developed shortness of breath during treatment and was sent to ER. Has developed worsening respiratory failure, intubated overnight. Attempted to received HD overnight though stopped due patient being unstable. Had poor access flow through catheter, had dwell of cathflo. Receiving HD this AM.  Hypotensive with UF with HD today. +weak, no fevers.     Past Medical History:        Diagnosis Date    Ambulatory dysfunction     walker for long distances, SOB with distance    Aortic stenosis     echo 2017    Arthritis     hands and hips    Asthma     Bilateral hilar adenopathy syndrome 6/3/2013    CAD (coronary artery disease)     Dr. Travis Braden [facility]) 04/19/2019    EF= 43%    CHF (congestive heart failure) (HCC)     Chronic pain     COPD (chronic obstructive pulmonary disease) ([address]     pulmonology Dr. Cheyenne Ahn    Depression     Diabetes mellitus ([address]     borderline    Difficult intravenous access     Emphysema of lung ([address]     ESRD (end stage renal disease) on dialysis ([address]     MWF    Fear of needles     Gastric ulcer     GERD (gastroesophageal reflux disease)     Heart valve problem     bicuspic valve    Hemodialysis patient ([address]     History of spinal fracture     work incident    Hx of blood clots     Bilateral lower extremities; stents in place    Hyperlipidemia     Hypertension     MI (myocardial infarction) ([address] 2019    has had 9 MIs. 2019 was the last    Neuromuscular disorder ([address]     due to CVA    Numbness and tingling in left arm     from fistula    Pneumonia     PONV (postoperative nausea and vomiting)     Prolonged emergence from general anesthesia     States requires more medication than most people    Sleep apnea     Uses CPAP    Stroke ([facility]     7mm thalamic cva 2017 deficts left side, left side weakness    TIA (transient ischemic attack)     Unspecified diseases of blood and blood-forming organs        Past Surgical History:        Procedure Laterality Date    AORTIC VALVE REPLACEMENT N/A 10/15/2019    TRANSCATHETER AORTIC VALVE REPLACEMENT FEMORAL APPROACH performed by Denae Blackman MD at [address] [place] [address] Ave Right 7/2/2019    PERITONEAL DIALYSIS CATHETER REMOVAL performed by Maggie Campos MD at [facility] COLONOSCOPY  2/29/2015    WN    CORONARY ANGIOPLASTY WITH STENT PLACEMENT  05/26/15    CYST REMOVAL  08/14/2013    EXCISION CYSTS, NECK X2 AND ABDOMINAL benign    DIAGNOSTIC CARDIAC CATH LAB PROCEDURE      DIALYSIS FISTULA CREATION Left 10/30/2017    LEFT BRACHIAL CEPHALIC FISTULA    DIALYSIS FISTULA CREATION Left 3/27/2019    LIGATION  AV FISTULA performed by Nicolle Fuentes MD at [facility]. [address], COLON, DIAGNOSTIC      OTHER SURGICAL HISTORY  02/01/2017    laparoscopic cholecystectomy with intraoperative cholangiogram    OTHER SURGICAL HISTORY  2018    PORT PLACEMENT  - vas cath    OTHER SURGICAL HISTORY Bilateral 06/26/2018    laprascopic peritoneal dialysis catheter placement    OTHER SURGICAL HISTORY Right 09/2018    peritoneal dialysis port placed on right side of abdomen    OTHER SURGICAL HISTORY  05/28/2019    PTA/Stenting R External Iliac artery    IA LAP INSERTION TUNNELED INTRAPERITONEAL CATHETER N/A 9/21/2018    LAPAROSCOPIC PERITONEAL DIALYSIS CATHETER REPLACEMENT performed by Maggie Campos MD at [address] [place] [address]  01/06/2016    UPPER GASTROINTESTINAL ENDOSCOPY 01/29/2017    possible candida, otherwise normal appearing    VASCULAR SURGERY  aprx 2 years ago    2 stents placed, each side of groin       Home Medications:    No current facility-administered medications on file prior to encounter. Current Outpatient Medications on File Prior to Encounter   Medication Sig Dispense Refill    carvedilol (COREG) 12.5 MG tablet TAKE 1 TABLET BY MOUTH TWICE DAILY WITH MEALS 60 tablet 10    torsemide (DEMADEX) 100 MG tablet Take 1 tablet by mouth daily 30 tablet 3    nitroGLYCERIN (NITROSTAT) 0.4 MG SL tablet DISSOLVE 1 TABLET UNDER THE TONGUE AS NEEDED FOR CHEST PAIN EVERY 5 MINUTES UP TO 3 TIMES.  IF NO RELIEF CALL 911. 25 tablet 10    gabapentin (NEURONTIN) 100 MG capsule TAKE 1-2 CAPSULES BY MOUTH THREE TIMES A  capsule 5    albuterol (PROVENTIL) (2.5 MG/3ML) 0.083% nebulizer solution INHALE 1 VIAL VIA NEBULIZER EVERY 6 HOURS AS NEEDED FOR WHEEZING 300 mL 5    clopidogrel (PLAVIX) 75 MG tablet TAKE 1 TABLET BY MOUTH ONCE DAILY 90 tablet 1     MG capsule TAKE 1 CAPSULE BY MOUTH TWICE DAILY 60 capsule 5    cyclobenzaprine (FLEXERIL) 10 MG tablet TAKE (1) TABLET BY MOUTH EVERY 8 HOURS AS NEEDED 90 tablet 2    QUEtiapine (SEROQUEL) 50 MG tablet TAKE 1 TABLET BY MOUTH IN THE EVENING 30 tablet 2    pantoprazole (PROTONIX) 40 MG tablet TAKE 1 TABLET BY MOUTH EVERY MORNING BEFORE BREAKFAST 30 tablet 1    thiamine (B-1) 100 MG/ML injection Infuse 1 mL intravenously daily 1 each 0    Multiple Vitamins-Minerals (THERAPEUTIC MULTIVITAMIN-MINERALS) tablet Take 1 tablet by mouth daily  0    Continuous Blood Gluc Sensor (DEXCOM G6 SENSOR) MISC Every 10 days 9 each 3    Continuous Blood Gluc Transmit (DEXCOM G6 TRANSMITTER) MISC 1 each by Does not apply route every 3 months 1 each 3    Continuous Blood Gluc  (DEXCOM G6 ) ADAM 1 each by Does not apply route Daily with lunch 1 each 0    DULoxetine (CYMBALTA) 60 MG extended release capsule TAKE 1 CAPSULE BY MOUTH EVERY DAY 30 capsule 5    traZODone (DESYREL) 150 MG tablet TAKE ONE (1) TABLET BY MOUTH NIGHTLY 30 tablet 10    pravastatin (PRAVACHOL) 40 MG tablet TAKE 1 TABLET BY MOUTH EACH EVENING 30 tablet 10    B Complex-C-Folic Acid (VIRT-CAPS) 1 MG CAPS TAKE 1 CAPSULE BY MOUTH EVERY DAY 90 capsule 1    Calcium Acetate, Phos Binder, 667 MG CAPS TAKE 1 CAPSULE BY MOUTH THREE TIMES DAILY WITH MEALS 90 capsule 3    BASAGLAR KWIKPEN 100 UNIT/ML injection pen ADMINISTER 60 UNITS UNDER THE SKIN EVERY NIGHT 15 mL 5    famotidine (PEPCID) 20 MG tablet TAKE 1 TABLET BY MOUTH TWICE DAILY AS NEEDED FOR HEARTBURN 180 tablet 0    LINZESS 145 MCG capsule TAKE 1 CAPSULE BY MOUTH EVERY MORNING BEFORE BREAKFAST 30 capsule 10    glucose monitoring kit (FREESTYLE) monitoring kit 1 kit by Does not apply route daily (Patient not taking: Reported on 10/5/2021) 1 kit 0    blood glucose test strips (GLUCOSE METER TEST) strip 1 each by In Vitro route 5 times daily As needed. 100 each 3    insulin aspart (NOVOLOG FLEXPEN) 100 UNIT/ML injection pen Inject 20 Units into the skin 3 times daily (before meals) 15 pen 5    blood glucose test strips (FREESTYLE LITE) strip Daily As needed. 100 strip 3    glucose monitoring kit (FREESTYLE) monitoring kit 1 kit by Does not apply route daily (Patient not taking: Reported on 10/5/2021) 1 kit 0    vitamin D (ERGOCALCIFEROL) 87285 units CAPS capsule TK 1 C PO WEEKLY  11    Tiotropium Bromide-Olodaterol (STIOLTO RESPIMAT) 2.5-2.5 MCG/ACT AERS Inhale 2 puffs into the lungs daily 2 Inhaler 0    Polyethylene Glycol 3350 GRAN       Glucose Blood (BLOOD GLUCOSE TEST STRIPS) STRP TEST 3-4 TIMES DAILY, AS DIRECTED (Patient not taking: Reported on 10/5/2021) 100 strip 3    Blood Glucose Monitoring Suppl ADAM USE AS DIRECTED.  1 Device 0    Alcohol Swabs PADS USE AS DIRECTED 300 each 3    albuterol sulfate  (90 Base) MCG/ACT inhaler Inhale 2 puffs into the lungs every 6 hours as needed for Wheezing 1 Inhaler 3    ipratropium-albuterol (DUONEB) 0.5-2.5 (3) MG/3ML SOLN nebulizer solution Inhale 3 mLs into the lungs every 6 hours as needed for Shortness of Breath 360 mL 1    calcium carbonate (TUMS) 500 MG chewable tablet Take 1 tablet by mouth 3 times daily as needed for Heartburn. Allergies:  Morphine    Social History:    Social History     Socioeconomic History    Marital status:      Spouse name: Not on file    Number of children: Not on file    Years of education: Not on file    Highest education level: Not on file   Occupational History    Not on file   Tobacco Use    Smoking status: Current Every Day Smoker     Packs/day: 0.50     Years: 33.00     Pack years: 16.50     Types: Cigarettes     Last attempt to quit: 2020     Years since quittin.5    Smokeless tobacco: Never Used   Vaping Use    Vaping Use: Never used   Substance and Sexual Activity    Alcohol use: Not Currently     Alcohol/week: 0.0 standard drinks     Comment: occ    Drug use: No    Sexual activity: Yes     Partners: Female     Comment:    Other Topics Concern    Not on file   Social History Narrative    Not on file     Social Determinants of Health     Financial Resource Strain:     Difficulty of Paying Living Expenses: Not on file   Food Insecurity:     Worried About [employer] in the Last Year: Not on file    Louise of Food in the Last Year: Not on file   Transportation Needs:     Lack of Transportation (Medical): Not on file    Lack of Transportation (Non-Medical):  Not on file   Physical Activity:     Days of Exercise per Week: Not on file    Minutes of Exercise per Session: Not on file   Stress:     Feeling of Stress : Not on file   Social Connections:     Frequency of Communication with Friends and Family: Not on file    Frequency of Social Gatherings with Friends and Family: Not on file    Attends Restoration Services: Not on file   [employer]s or Organizations: Not on file    Attends Club or Organization Meetings: Not on file    Marital Status: Not on file   Intimate Partner Violence:     Fear of Current or Ex-Partner: Not on file    Emotionally Abused: Not on file    Physically Abused: Not on file    Sexually Abused: Not on file   Housing Stability:     Unable to Pay for Housing in the Last Year: Not on file    Number of [place] in the Last Year: Not on file    Unstable Housing in the Last Year: Not on file       Family History:   Family History   Problem Relation Age of Onset    Diabetes Mother     Heart Disease Father     Kidney Disease Sister         stage 4-kidney failure    Cancer Sister     Heart Disease Sister     Obesity Sister     Cancer Sister     Heart Disease Sister     Obesity Sister     Alcohol Abuse Brother        Review of Systems:   Pertinent positives stated above in HPI. Remainder of 10 point review of systems were reviewed and were negative. Physical exam:   Constitutional:  VITALS:  BP (!) 87/44   Pulse 68   Temp 96.4 °F (35.8 °C)   Resp 20   Ht 5' 9\" (1.753 m)   Wt 275 lb 5.7 oz (124.9 kg)   SpO2 99%   BMI 40.66 kg/m²   Gen: ill-appearing, intubated, sedated.   Skin: no rash, turgor wnl  Heent:  Eomi, intubated  Neck: no bruits or jvd noted, thyroid normal  Cardiovascular:  S1, S2 without m/r/g  Respiratory: CTA B without w/r/r; respiratory effort normal  Abdomen:  +bs, soft, nt, nd, no hepatosplenomegaly  Ext: + lower extremity edema  Access: Left IJ [address]  Psychiatric: mood and affect limited; judgement and insight limited  Musculoskeletal:  Rom, muscular strength limited; digits, nails normal    Data/  CBC:   Lab Results   Component Value Date    WBC 13.6 11/30/2021    RBC 3.62 11/30/2021    HGB 10.6 11/30/2021    HCT 33.2 11/30/2021    MCV 91.6 11/30/2021    MCH 29.3 11/30/2021    MCHC 32.0 11/30/2021    RDW 16.0 11/30/2021     11/30/2021    MPV 8.4 11/30/2021     BMP:    Lab Results Component Value Date     11/30/2021    K 7.7 11/30/2021    K 5.2 11/29/2021    CL 94 11/30/2021    CO2 17 11/30/2021     11/30/2021    LABALBU 3.4 11/30/2021    CREATININE 7.3 11/30/2021    CALCIUM 8.5 11/30/2021    GFRAA 10 11/30/2021    GFRAA >60 05/17/2013    LABGLOM 8 11/30/2021    GLUCOSE 424 11/30/2021         Assessment/    - End stage renal disease - on HD Mon-Wed-Fri    - Acute hypoxic, hypercarbic respiratory failure/Pulmonary edema    - Anion-gap metabolic acidosis in setting of hyperglycemia    - Hyperkalemia - in setting of translocation with hyperglycemia    - Hyponatremia - in setting of translocation with hyperglycemia    - Hypertension - labile, high on admission, now running hypotensive    - Anemia of chronic disease - Hb 10.1, Epogen 1,600 units qHD      Plan/    - HD today with UF as tolerated with crit line monitoring, prn albumin  - Agree with insulin drip  - Monitor for DAVON needs  - Trend labs, bp's      Thank you for the consultation. Please do not hesitate to call with questions. ____________________________________  mAanda Cruz MD  [facility]  [email or web address]  Office: [phone]

## 2021-11-30 NOTE — PROGRESS NOTES
11/30/21 0737   Vent Information   Equipment Changed HME   Vent Type 980   Vent Mode AC/PC   Rate Set 30 bmp   Pressure Support 0 cmH20   FiO2  50 %   SpO2 100 %   SpO2/FiO2 ratio 200   Sensitivity 3   PEEP/CPAP 5   I Time/ I Time % 0.6 s   Vent Patient Data   High Peep/I Pressure 35   Peak Inspiratory Pressure 42 cmH2O   Mean Airway Pressure 16 cmH20   Rate Measured 30 br/min   Vt Exhaled 399 mL   Minute Volume 12 Liters   I:E Ratio 1:2.30   Cough/Sputum   Sputum How Obtained Endotracheal; Oral; Suctioned   Cough Productive   Sputum Amount Small   Sputum Color Cloudy   Tenacity Thick   Spontaneous Breathing Trial (SBT) RT Doc   Pulse 73   Breath Sounds   Right Upper Lobe Diminished   Right Middle Lobe Diminished   Right Lower Lobe Diminished   Left Upper Lobe Diminished   Left Lower Lobe Diminished   Additional Respiratory  Assessments   Resp 24   Position Semi-Greene's   Alarm Settings   High Pressure Alarm 45 cmH2O   Low Minute Volume Alarm 2 L/min   High Respiratory Rate 40 br/min   Low Exhaled Vt  200 mL   Patient Observation   Observations advanced ett 4 cm, ambu bag and mask at bedside, alarms on and audible, apnea parameters on   ETT (adult)   Placement Date/Time: 11/30/21 0135   Tube Size: 8 mm  Secured at: 24 cm   Secured at 29 cm  (ett tube advanced 4 cm)   Measured From Lips   ET Placement Right   Secured By Commercial tube wills   Site Condition Dry   Cuff Pressure   (mov)

## 2021-11-30 NOTE — PROGRESS NOTES
Events overnight    Patient admitted by Dr. Shital Duran last evening. Patient arrived to ICU from the ED at around 10 PM on 4 L oxygen. Patient became progressively hypertensive, and by the time dialysis was initiated just after midnight patient became restless, confused, progressively hypoxemic and would not cooperate with dialysis at which point dialysis was paused. ABG performed showed acute respiratory acidosis with pH 6.9. A decision was made to emergently intubate the patient at that point. Patient was successfully intubated using glide scope and placed on mechanical ventilation. His blood pressure continued to stay good immediate post intubation, so head CT was reinitiated. Subsequently at around 3 AM patient's heart rate began to drop with HR in the 30s, his potassium level continued to rise and patient became progressively hypotensive. At this point HD was stopped in consultation with nephrology, patient was given 0.5 mg of IV atropine, sedation including propofol and fentanyl gtt was put on hold. Bedside labs showed potassium 6.7. At this point I ordered IV insulin/dextrose, sodium bicarb, calcium gluconate, albuterol nebs and oral Kayexalate. Initially plan was to place a central line for hypotension, but after the sedation was stopped patient blood pressure improved, and HR subsequently came up into the 50s to 60s. Could have been propofol. Informed RN to consult nephrology for possible CRRT, or to resume HD now since his BP is improved. Ventilator settings were changed such that patient's minute ventilation improved. His repeat ABG shows improving pH and PCO2 washout.        Dr.Adike Wells

## 2021-11-30 NOTE — ED NOTES
Report given to [facility].   Patient transported to ICU with this RN and DANI Gross RN  11/29/21 8054

## 2021-11-30 NOTE — CONSULTS
Comprehensive Nutrition Assessment    Type and Reason for Visit:  Initial, Consult    Nutrition Recommendations/Plan:   1. Recommend order \"Diet: Adult Tube Feed\". Initiate Nepro (renal formula) at 10 mL/hr and as tolerated, increase by 10 mL/hr q 4 hours until goal of 40 mL/hr. 2. Recommend 60 mL H20 q 4 hours. Monitor IVF, Na labs and need for further adjustment  3. Recommend 3 Bottle Proteinex P2Go daily via syringe. Flush with 30 mL H20 before and after. Proteinex P2Go should not be mixed directly with the tube feeding formula. 4. Monitor for tolerance (bowel habits, N/V, cramping, abdominal exam findings: distended, firm, tense, guarded, discomfort). 5. Monitor nutrition adequacy, pertinent labs, bowel habits, wt changes, and clinical progress    Nutrition Assessment:  Pt admitted with SOB during HD. Hx of CAD s/p AVR, DM, CHF, and COPD. New vent. FiO2 30% and PEEP 5. Currently on fentanyl and levo. Consulted for enteral nutrition recommendations. Blood sugars elevated on admission, now down to 140 mg/dL. Potassium and phosphorus elevated. Malnutrition Assessment:  Malnutrition Status: At risk for malnutrition (Comment)      Estimated Daily Nutrient Needs:  Energy (kcal):  2882-8349 kcal; Weight Used for Energy Requirements:  Current (125 kg)     Protein (g):  144-159 g; Weight Used for Protein Requirements:  Ideal (2.0-2.2 g/kg)        Fluid (ml/day):   ; Method Used for Fluid Requirements:  1 ml/kcal      Nutrition Related Findings:  +2 generalized edema. OG. Na 129  K 7.7  Phos 7.9      Wounds:  None       Current Nutrition Therapies:    Diet NPO  Current Tube Feeding (TF) Orders:  · Feeding Route: Orogastric  · Formula: Renal Formula  · Schedule: Continuous  · Additives/Modulars: Protein  · Goal TF & Flush Orders Provides: Nepro at goal rate of 40 mL/hr x20 hours to provide 800 mL TV, 1440 kcal, 65 g protein, and 581 mL free water.  +60 mL free water flush q 4 hrs. +3 bottles proteinex daily

## 2021-11-30 NOTE — FLOWSHEET NOTE
11/30/21 0038 11/30/21 0313   Treatment   Time On 0038  --    Time Off  --  0313   Vital Signs   BP (!) 159/106 (!) 97/47   Temp 96 °F (35.6 °C) 94.2 °F (34.6 °C)   Pulse 88 (!) 37   Resp 13 30   SpO2 100 % 99 %   Weight 270 lb 1 oz (122.5 kg)  --    Weight Method Bed scale  --      Treatment time: 9 mins of 2 hour STAT tx  Net UF: 0 ml     Pre weight: 122.5 kg   Post weight: unable to obtain kg  EDW: 116 kg     Access used: LTDC  Access function: poor, good post Cathflo with  ml/min     Medications or blood products given: Cathflo     Regular outpatient schedule: [facility]     Summary of response to treatment: pt intubated and placed on vent d/t unstable, ICU MD wants HD ended, Dr Cade Vera called and relayed ICU MDs wishes to end HD d/t pt unstable, Dr Cade Vera agreed to stop HD and re-evaluate pt later this AM, blood returned, primary RN Radha at bedside and updated. Copy of dialysis treatment record placed in chart, to be scanned into EMR.

## 2021-11-30 NOTE — PROGRESS NOTES
Hospitalist Progress Note    Date of Admission: 11/29/2021    Chief Complaint: Shortness of breath    Hospital Course:   46 y.o. male who presented to [facility] with above complaint. He has a history of ESRD on hemodialysis gets dialysis Monday Wednesday and Friday. He went to dialysis today and he developed shortness of breath and dialysis was stopped senior care and sent to the emergency room. Currently he is on 5 L oxygen, struggling with respiration  He denies headache, change in mental status, chest pain, nausea vomiting diarrhea or increasing edema. He does not urinate that much. He received IV Lasix in the emergency room and he has no urine output so far. He has cough with white sputum. There is no fever     Subjective: He he had respiratory decompensation overnight and required intubation. He is stable on mechanical ventilation this morning. There were some issues completing hemodialysis due to hypotension, which is likely related to sedation. Currently completing hemodialysis this morning. Labs:   Recent Labs     11/29/21  1426 11/30/21  0120 11/30/21  0733 11/30/21  0808 11/30/21  0925   WBC 11.2*  --  13.6*  --   --    HGB 10.0*   < > 10.6* 10.2* 10.1*   HCT 31.3*  --  33.2*  --   --      --  219  --   --     < > = values in this interval not displayed. Recent Labs     11/29/21  1426 11/30/21  0146 11/30/21  0420   *  --  129*   K 5.2* 6.7* 7.7*   CL 96*  --  94*   CO2 19*  --  17*   BUN 88*  --  100*   CREATININE 7.6*  --  7.3*   CALCIUM 8.5  --  8.5   PHOS  --   --  7.9*     Recent Labs     11/29/21  1426   AST 12*   ALT 16   BILITOT <0.2   ALKPHOS 128     No results for input(s): INR in the last 72 hours.     Physical Exam Performed:    BP (!) 94/38   Pulse 69   Temp 96.4 °F (35.8 °C)   Resp 20   Ht 5' 9\" (1.753 m)   Wt 275 lb 5.7 oz (124.9 kg)   SpO2 99%   BMI 40.66 kg/m²       General appearance: Intubated, sedated, no acute distress  Eyes: Conjunctivae normal, sclera non-icteric. PERRL. HENT: Oropharynx clear, mucous membranes moist.  Respiratory: On vent. Limited examination, grossly clear to auscultation bilaterally without wheezes, crackles or rhonchi. Cardiovascular: Regular rate and rhythm, normal S1/S2, no murmurs. No JVD. No significant LE edema. Abdomen: Soft, non-tender; non-distended, normal bowel sounds. Neuro: Limited exam, CN grossly intact with no obvious focal deficits. Psychiatric: Intubated, sedated, limited exam.   Skin: Skin color, texture, turgor normal.  No rashes or lesions. Capillary Refill: Brisk,< 3 seconds   Peripheral Pulses: +2 palpable, equal bilaterally     Assessment/Plan:    Active Hospital Problems    Diagnosis     CHF (congestive heart failure), NYHA class I, acute on chronic, combined ([facility] [I50.43]     Respiratory failure with hypoxia (HCC) [J96.91]      Acute and chronic combined systolic and diastolic CHF  -patient was started on IV Lasix but did not pass urine. He developed worsening respiratory status and required intubation.  -Continue fluid management with hemodialysis per nephrology. -Reccent echocardiogram showed ejection fraction of 51% and diastolic dysfunction grade 2    Acute hypoxic respiratory failure-secondary to above pulmonary edema/CHF. Continue vent management per pulmonology and wean as tolerated. ESRD on hemodialysis: Complicated by severe metabolic acidosis and hyperkalemia. Hyperkalemia required medical management initially. Typically he gets dialysis Monday Wednesday Friday but on day of presentation he could not complete his dialysis. Nephrology following. Resume hemodialysis as tolerated. Possible sepsis: He was started on empiric antibiotics for possible pneumonia. Procalcitonin was mildly elevated although in the face of ESRD significance is unclear. Covid testing was negative. Monitor.   De-escalate antibiotic therapy pending clinical course    Diabetes, uncontrolled: Improving with resumption of basal bolus insulin regimen. Monitor. Elevated troponin-secondary to ESRD but flat trend    Morbid Obesity -  With Body mass index is 39.69 kg/m². Complicating assessment and treatment. Placing patient at risk for multiple co-morbidities as well as early death and contributing to the patient's presentation. Aortic stenosis: Stable.      ZAINAB-CPAP at home    COPD-no exacerbation    Remote history of DVT    DVT Prophylaxis: Heparin  Diet: Diet NPO  Code Status: Full Code  PT/OT Eval Status: NA    Dispo - ICU    Bentley Lott MD

## 2021-11-30 NOTE — PROGRESS NOTES
Pt transferred to bed 235 safely w/ personal belongings. Four eyed skin assessment done, pt has some wounds on right shin. Pt unsure of where it came from. CHG bathed. Wife Kaylen called and updated.

## 2021-11-30 NOTE — PROGRESS NOTES
Paged Dr. Tawanna Valdez regarding pt's elevated BP 190s/120s. Pt also received lasix with no urine output in the ER and dialysis RN will be in this PM for HD. Dr. Tawanna Valdez said dialysis will help with BP, no new orders at this time. Will continue to monitor.

## 2021-11-30 NOTE — PROCEDURES
Patient Name: Candice Groves   Medical Record Number: [id number]  Date: 11/30/2021   Time: 5:09 AM   Room/Bed: [address] [place] [address]  Intubation Procedure Note    Indication: Respiratory failure    Consent: Unable to be obtained due to the emergent nature of this procedure. Medications Used: etomidate intravenously and rocuronium intravenously    Procedure: The patient was placed in the appropriate position. Cricoid pressure was utilized. Intubation was performed by direct laryngoscopy using a laryngoscope and an 8.0 cuffed endotracheal tube. The cuff was then inflated and the tube was secured appropriately at a distance of 24 cm to the dental ridge. Initial confirmation of placement included bilateral breath sounds, an end tidal CO2 detector, tube fogging and adequate chest rise. A chest x-ray to verify correct placement of the tube has been ordered but is still pending. The patient tolerated the procedure well.      Complications: None    Electronically Signed by: Stefanie Franco

## 2021-11-30 NOTE — PLAN OF CARE
Patient's EF (Ejection Fraction) is greater than 40%    Heart Failure Medications:  Diuretics[de-identified] Furosemide    (One of the following REQUIRED for EF </= 40%/SYSTOLIC FAILURE but MAY be used in EF% >40%/DIASTOLIC FAILURE)        ACE[de-identified] Lisinopril        ARB[de-identified] None         ARNI[de-identified] None    (Beta Blockers)  NON- Evidenced Based Beta Blocker (for EF% >40%/DIASTOLIC FAILURE): Other    Evidenced Based Beta Blocker::(REQUIRED for EF% <40%/SYSTOLIC FAILURE) Carvedilol- Coreg  . .................................................................................................................................................. Patient's weights and intake/output reviewed: Yes    Patient's Last Weight: 122.2 kg kg obtained by Lift Scale. Difference of 2 lbs more than last documented weight. Intake/Output Summary (Last 24 hours) at 11/30/2021 0884  Last data filed at 11/30/2021 1400  Gross per 24 hour   Intake 1680.26 ml   Output 4789 ml   Net -3108.74 ml       Comorbidities Reviewed Yes    Patient has a past medical history of Ambulatory dysfunction, Aortic stenosis, Arthritis, Asthma, Bilateral hilar adenopathy syndrome, CAD (coronary artery disease), Cardiomyopathy ([address] CHF (congestive heart failure) ([address] Chronic pain, COPD (chronic obstructive pulmonary disease) ([address] Depression, Diabetes mellitus ([address] Difficult intravenous access, Emphysema of lung ([address] ESRD (end stage renal disease) on dialysis ([address] Fear of needles, Gastric ulcer, GERD (gastroesophageal reflux disease), Heart valve problem, Hemodialysis patient ([address] History of spinal fracture, Hx of blood clots, Hyperlipidemia, Hypertension, MI (myocardial infarction) ([address] Neuromuscular disorder ([address] Numbness and tingling in left arm, Pneumonia, PONV (postoperative nausea and vomiting), Prolonged emergence from general anesthesia, Sleep apnea, Stroke ([address] TIA (transient ischemic attack), and Unspecified diseases of blood and blood-forming organs. >>For CHF and Comorbidity documentation on Education Time and Topics, please see Education Tab    Progressive Mobility Assessment:  What is this patient's Current Level of Mobility?: Requires Bed Rest  How was this patient Mobilized today?: Unable to Mobilize, ambulated 0 ft                 With Whom? Nurse                 Level of Difficulty/Assistance:  x2      Pt resting in bed at this time on  vent . Pt  not able to assess  shortness of breath. Pt without lower extremity edema.      Patient and/or Family's stated Goal of Care this Admission: reduce shortness of breath, increase activity tolerance, better understand heart failure and disease management, be more comfortable, and reduce lower extremity edema prior to discharge        :

## 2021-11-30 NOTE — PROGRESS NOTES
11/30/21 0140   Vent Information   Vent Type 980   Vent Mode AC/PC   Rate Set 26 bmp   Pressure Support 0 cmH20   FiO2  50 %   SpO2 100 %   SpO2/FiO2 ratio 200   Sensitivity 3   PEEP/CPAP 5   I Time/ I Time % 0 s   Vent Patient Data   High Peep/I Pressure 30   Peak Inspiratory Pressure 38 cmH2O   Mean Airway Pressure 12 cmH20   Rate Measured 26 br/min   Vt Exhaled 311 mL   Minute Volume 8.07 Liters   I:E Ratio 1:3.10   Spontaneous Breathing Trial (SBT) RT Doc   Pulse 89   Breath Sounds   Right Upper Lobe Diminished   Right Middle Lobe Diminished   Right Lower Lobe Diminished   Left Upper Lobe Diminished   Left Lower Lobe Diminished   Additional Respiratory  Assessments   Resp 26   Position Semi-Greene's   Alarm Settings   High Pressure Alarm 45 cmH2O   Low Minute Volume Alarm 2 L/min   Apnea (secs) 20 secs   High Respiratory Rate 40 br/min   Low Exhaled Vt  200 mL   ETT (adult)   Placement Date/Time: 11/30/21 0135   Tube Size: 8 mm  Secured at: 24 cm   $ Intubation emergent  $ Yes   Secured at 24 cm   Measured From [address] [place] [address],[address] By Commercial tube wills   Site Condition Dry

## 2021-11-30 NOTE — PROGRESS NOTES
11/30/21 0258   RT Protocol   History Pulmonary Disease 1   Respiratory pattern 6   Breath sounds 2   Cough 0   Indications for Bronchodilator Therapy Decreased or absent breath sounds;  On home bronchodilators   Bronchodilator Assessment Score 9

## 2021-11-30 NOTE — PROGRESS NOTES
11/30/21 1145   Vent Information   Vent Type 980   Vent Mode AC/VC   Vt Ordered 550 mL   Rate Set 20 bmp   Peak Flow 44 L/min   Pressure Support 0 cmH20   FiO2  30 %   SpO2 99 %   SpO2/FiO2 ratio 330   Sensitivity 3   PEEP/CPAP 5   Vent Patient Data   Peak Inspiratory Pressure 22 cmH2O   Mean Airway Pressure 13 cmH20   Rate Measured 20 br/min   Vt Exhaled 571 mL   Minute Volume 9.92 Liters   I:E Ratio 1:1.20   Cough/Sputum   Sputum How Obtained Oral; Suctioned  (DIALYSIS CATH ABOVE SXT CATH, NO DEEP SXT)   Spontaneous Breathing Trial (SBT) RT Doc   Pulse 85   Breath Sounds   Right Upper Lobe Diminished   Right Middle Lobe Diminished   Right Lower Lobe Diminished   Left Upper Lobe Diminished; Rhonchi   Left Lower Lobe Diminished   Additional Respiratory  Assessments   Resp 20   Position Semi-Greene's   Alarm Settings   High Pressure Alarm 45 cmH2O   Low Minute Volume Alarm 2 L/min   High Respiratory Rate 40 br/min   Low Exhaled Vt  200 mL   Patient Observation   Observations HHN WITH AEROGEN   ETT (adult)   Placement Date/Time: 11/30/21 0135   Tube Size: 8 mm  Secured at: 24 cm   Secured at 24 cm   Measured From [address] [place] [address],[address] By Commercial tube wills   Site Condition Dry   Cuff Pressure 32 cm H2O

## 2021-11-30 NOTE — PROGRESS NOTES
Pt's HR dropping down to 33. Per Dr. Neela Bobo, stop propofol and fentanyl gtt and give 0.5mg of Atropine. PRN orders put in.    Pt's BP 90s over 50s, too unstable to continue dialysis. Will discontinue for now and reevaluate in the AM.     320: This RN asked for central line. Dr. Neela Bobo initially said to get everything ready. Called wife to get consent with Preston Cramer RN as witness. All questions answered and no concerns at this time. 430: Followed up with Dr. Neela Bobo about central line placement. Dr. Neela Bobo said pt does not need it at this time due to being on one gtt.

## 2021-11-30 NOTE — ED NOTES
Phone report provided to [facility] [place]. No further question at this time.      Shiva Carbone, [facility]  11/29/21 2022

## 2021-11-30 NOTE — PROGRESS NOTES
Lab called for critical values: Pt's K is 7.7,  and Creat 7.3. Paged Dr. Deon River and said pt needs CRRT and to page Nephrology. 319: Paged Nephrology, awaiting response. 544: Dr. Rosibel Caruso called back, wanted to know what was given to the pt and previous labs. Critical values were from morning labs. Dr. Rosibel Caruso requested labs be drawn Q4. Kayexalate, calcium gluconate, sodium bicarb was given. Dr. Rosibel Caruso also wanted the number to [facility]. Number was given. Awaiting orders.

## 2021-11-30 NOTE — CARE COORDINATION
Chart reviewed, pt on vent, no family at bedside. Pt from home with spouse, goes to  MWF at [facility], drives, uses rolling walker, has ramp entrance. Pt had been admitted almost monthly with 9 admissions so far in 2021, last discharged from [facility] 10/15. Pt was discharged home and home care orders sent to [facility] skilled home care. Writer spoke with [facility], they did not open because they were unable to contact pt. CM will continue to follow pt's progress and coordinate discharge arrangements as appropriate.   REINALDO Merlos-RN

## 2021-11-30 NOTE — PROGRESS NOTES
11/30/21 0418   Vent Information   Vent Type 980   Vent Mode AC/PC   Rate Set 30 bmp   Pressure Support 0 cmH20   FiO2  50 %   SpO2 90 %   SpO2/FiO2 ratio 180   Sensitivity 3   PEEP/CPAP 5   I Time/ I Time % 0 s   Vent Patient Data   High Peep/I Pressure 35   Peak Inspiratory Pressure 42 cmH2O   Mean Airway Pressure 16 cmH20   Rate Measured 30 br/min   Vt Exhaled 364 mL   Minute Volume 11.1 Liters   I:E Ratio 1:2.30   Spontaneous Breathing Trial (SBT) RT Doc   Pulse (!) 45   Breath Sounds   Right Upper Lobe Diminished   Right Middle Lobe Diminished   Right Lower Lobe Diminished   Left Upper Lobe Diminished   Left Lower Lobe Diminished   Additional Respiratory  Assessments   Resp 15   Position Semi-Greene's   Alarm Settings   High Pressure Alarm 45 cmH2O   Low Minute Volume Alarm 2 L/min   High Respiratory Rate 40 br/min   ETT (adult)   Placement Date/Time: 11/30/21 0135   Tube Size: 8 mm  Secured at: 24 cm   Secured at 24 cm   Measured From Lips   ET Placement Left   Secured By Commercial tube wills   Site Condition Dry

## 2021-11-30 NOTE — PLAN OF CARE
Problem: Nutrition  Goal: Optimal nutrition therapy  Outcome: Ongoing  Note: Nutrition Problem #1: Inadequate energy intake  Intervention: Food and/or Nutrient Delivery: Start Tube Feeding  Nutritional Goals:  Tolerate enteral nutrition at goal rate this admission w/o GI or electrolyte disturbances

## 2021-11-30 NOTE — FLOWSHEET NOTE
Treatment time: 3.5hrs    Net UF: 2 liters    Pre weight: 124.9kg bedscale  Post weight: 121.9kg  EDW: 116kg    Access used: LIJ TDC  Access function: Good reversed    Medications or blood products given: Albumin and heparin    Regular outpatient schedule: MWF    Summary of response to treatment:      11/30/21 1255   Vital Signs   BP (!) 104/52   Temp 96.9 °F (36.1 °C)   Pulse 87   Resp 20   SpO2 98 %   Weight 268 lb 11.9 oz (121.9 kg)   Weight Method Bed scale   Percent Weight Change -2.4   Dry Weight 255 lb 11.7 oz (116 kg)   Pain Assessment   Pain Assessment CPOT   Pain Level 0   Post-Hemodialysis Assessment   Post-Treatment Procedures Blood returned; Catheter capped, clamped and heparinized x 2 ports   Machine Disinfection Process Exterior Machine Disinfection   Rinseback Volume (ml) 400 ml   Total Liters Processed (l/min) 79.9 l/min   Dialyzer Clearance Moderately streaked   Duration of Treatment (minutes) 210 minutes   Heparin amount administered during treatment (units) 4100 units   Hemodialysis Intake (ml) 500 ml   Hemodialysis Output (ml) 2534 ml   NET Removed (ml) 2034 ml   Tolerated Treatment Fair   Patient Response to Treatment Sbp . Albumin 25gm and Levophed gtt infused for bp support. Bilateral Breath Sounds Diminished   Edema Generalized   Physician Notified? Yes   Copy of dialysis treatment record placed in chart, to be scanned into EMR.

## 2021-11-30 NOTE — CONSULTS
INPATIENT PULMONARY CRITICAL CARE CONSULT NOTE      Chief Complaint/Referring Provider:  Patient is being seen at the request of Dr. Yasmine Graham  for a consultation for acute respiratory failure     Presenting HPI: Patient was in the hospital because of increasing shortness of breath    As per the admitting provider-51 y.o. male who presented to [facility] with above complaint. He has a history of ESRD on hemodialysis gets dialysis Monday Wednesday and Friday. He went to dialysis today and he developed shortness of breath and dialysis was stopped senior living and sent to the emergency room. Currently he is on 5 L oxygen, struggling with respiration  He denies headache, change in mental status, chest pain, nausea vomiting diarrhea or increasing edema. He does not urinate that much. He received IV Lasix in the emergency room and he has no urine output so far. He has cough with white sputum.   There is no fever     Patient apparently decompensated and patient had to be intubated and put on mechanical vent support, patient was seen this morning was continued to be on mechanical vent support, patient was on 30% oxygen on the ventilator to maintain saturation, patient was on IV sedation to maintain patient mental synchrony, patient required transient IV pressors to maintain hemodynamics, hemodialysis was being attempted and 2 kilogram removal is being contemplated as per HD nurse, patient had sinus rhythm on the monitor, patient's glycemic control was suboptimal, no other pertinent review of system could be obtained because of patient's clinical status which remains the case and critical        Patient Active Problem List    Diagnosis Date Noted    Hypotension due to drugs 11/25/2017    Acute on chronic combined systolic and diastolic heart failure ([address]     Ischemic cardiomyopathy     Elevated brain natriuretic peptide (BNP) level     Dyslipidemia     Type 2 diabetes, uncontrolled, with neuropathy ([address] 03/04/2014  Bicuspid aortic valve 06/03/2013    CAD S/P percutaneous coronary angioplasty 05/14/2013    PVD (peripheral vascular disease) ([address] 05/14/2013    S/P TAVR (transcatheter aortic valve replacement) 10/19/2019    Chest pain     Essential hypertension 11/14/2013    Chronic obstructive pulmonary disease ([address] 05/14/2013    Acute respiratory failure with hypercapnia ([address] 11/30/2021    Acute pulmonary edema ([address] 11/30/2021    Obesity, Class II, BMI 35-39.9 11/30/2021    Grade II diastolic dysfunction 36/62/2765    Shock circulatory ([address] 11/30/2021    Smoker 11/30/2021    DM (diabetes mellitus), secondary uncontrolled ([address] 11/30/2021    Normocytic normochromic anemia 11/30/2021    CHF (congestive heart failure), NYHA class I, acute on chronic, combined ([address] 11/29/2021    Respiratory failure with hypoxia ([address] 11/29/2021    Acute CVA (cerebrovascular accident) ([address] 10/11/2021    Alcohol abuse, daily use 10/11/2021    Speech problem     Left face and left arm tingling     Urinary tract infection with hematuria     Acute cerebrovascular accident (CVA) ([address] 09/07/2021    CHF (congestive heart failure) ([address] 08/13/2021    Acute hypoxemic respiratory failure ([address] 08/12/2021    Type 2 diabetes mellitus with hyperglycemia ([address] 06/27/2021    Acute encephalopathy 06/27/2021    Cellulitis and abscess of hand 04/24/2021    Iliac artery occlusion, right (HCC)     Cellulitis of right foot 01/29/2021    Peripheral vascular occlusive disease ([address] 01/02/2021    Ataxia     Weakness of both lower extremities 12/30/2020    Sinus bradycardia 12/30/2020    Sinus pause     GBS (Guillain-Media syndrome) (HCC)     Bilateral leg weakness 12/04/2020    Obesity 11/03/2020    DKA, type 1, not at goal [facility]) 04/25/2020    Generalized weakness 02/04/2020    Former smoker 11/19/2019    Bradycardia 10/19/2019    Syncope and collapse 10/19/2019    Atrial fibrillation (HCC)     Hypercholesteremia 07/30/2019    Chronic bronchitis ([address] 05/21/2019    Nasal congestion 05/21/2019    Chronic, continuous use of opioids 04/15/2019    Steal syndrome of dialysis vascular access (HCC)     SOB (shortness of breath) 57/89/1012    Diastolic dysfunction 71/16/5367    Anemia of chronic disease 11/12/2018    Acute respiratory failure ([address] 11/09/2018    Pulmonary edema 11/09/2018    Fluid overload 11/09/2018    Renovascular hypertension     Mixed hyperlipidemia     Cigarette nicotine dependence in remission     Acute combined systolic and diastolic CHF, NYHA class 4 ([address] 09/14/2018    Neuromuscular disorder (HCC)     Acute diastolic CHF (congestive heart failure) ([address] 03/18/2018    Hemodialysis-associated hypotension 11/22/2017    Left arm pain 11/22/2017    Dizziness 11/22/2017    ESRD (end stage renal disease) on dialysis ([address] 11/22/2017    Mucus plugging of bronchi     Hypervolemia     Hyperkalemia     Nonrheumatic aortic valve stenosis     Pleural effusion     Chronic anemia     Diarrhea 08/04/2017    Arterial ischemic stroke, ICA, right, acute ([address]     Type 2 diabetes mellitus without complication, without long-term current use of insulin ([address]     HTN (hypertension), benign     ZAINAB (obstructive sleep apnea)     Tobacco abuse 05/21/2017    CKD stage 4 due to type 2 diabetes mellitus ([address] 05/21/2017    CVA (cerebral vascular accident) ([address] 05/21/2017    Chronic renal failure, stage 5 ([address] 03/07/2017    Chest pressure 02/17/2017    Hypertensive urgency 02/17/2017    Hypoxia     Respiratory distress     Angina, class IV ([facility])     Dyspnea     Gastroparesis due to DM ([address]     Biliary colic 60/99/3573    Symptomatic cholelithiasis 01/04/2017    Degeneration of lumbar or lumbosacral intervertebral disc 03/18/2016    Lumbar radiculopathy 03/18/2016    Lumbosacral spondylosis without myelopathy 03/18/2016    ZAINAB on CPAP 02/11/2016    Coronary artery disease involving native coronary artery of native heart without angina pectoris     Obesity (BMI 30-39. 9)     CHF exacerbation ([address] 05/24/2015    Hypertensive heart disease with congestive heart failure with preserved left ventricular function ([address] 04/24/2015    Pneumonia of right upper lobe due to infectious organism 03/28/2015    DM (diabetes mellitus), secondary, uncontrolled, w/neurologic complic ([address] 85/17/5885    Hematoma 03/17/2015    Depression with anxiety 06/05/2014    Passive smoke exposure 05/30/2014    Diabetic neuropathy ([address] 11/14/2013    Claudication in peripheral vascular disease ([address] 06/26/2013    Sleep apnea 06/03/2013    Bilateral hilar adenopathy syndrome 06/03/2013    Nonischemic cardiomyopathy ([address] 05/22/2013    Acute respiratory failure with hypoxia and hypercapnia ([address] 05/14/2013       Past Medical History:   Diagnosis Date    Ambulatory dysfunction     walker for long distances, SOB with distance    Aortic stenosis     echo 2017    Arthritis     hands and hips    Asthma     Bilateral hilar adenopathy syndrome 6/3/2013    CAD (coronary artery disease)     Dr. Pablo Norman [facility]) 04/19/2019    EF= 43%    CHF (congestive heart failure) (HCC)     Chronic pain     COPD (chronic obstructive pulmonary disease) ([address]     pulmonology Dr. Alisa Aguilar    Depression     Diabetes mellitus ([address]     borderline    Difficult intravenous access     Emphysema of lung ([address]     ESRD (end stage renal disease) on dialysis ([address]     MWF    Fear of needles     Gastric ulcer     GERD (gastroesophageal reflux disease)     Heart valve problem     bicuspic valve    Hemodialysis patient ([address]     History of spinal fracture     work incident    Hx of blood clots     Bilateral lower extremities; stents in place    Hyperlipidemia     Hypertension     MI (myocardial infarction) ([address] 2019    has had 9 MIs. 2019 was the last    Neuromuscular disorder ([address]     due to CVA  Numbness and tingling in left arm     from fistula    Pneumonia     PONV (postoperative nausea and vomiting)     Prolonged emergence from general anesthesia     States requires more medication than most people    Sleep apnea     Uses CPAP    Stroke ([facility]     7mm thalamic cva 2017 deficts left side, left side weakness    TIA (transient ischemic attack)     Unspecified diseases of blood and blood-forming organs         Past Surgical History:   Procedure Laterality Date    AORTIC VALVE REPLACEMENT N/A 10/15/2019    TRANSCATHETER AORTIC VALVE REPLACEMENT FEMORAL APPROACH performed by Nathaly Mckeon MD at [address] [place] [address] Ave Right 7/2/2019    PERITONEAL DIALYSIS CATHETER REMOVAL performed by Vu Parry MD at [facility] COLONOSCOPY  2/29/2015    WN    CORONARY ANGIOPLASTY WITH STENT PLACEMENT  05/26/15    CYST REMOVAL  08/14/2013    EXCISION CYSTS, NECK X2 AND ABDOMINAL benign    DIAGNOSTIC CARDIAC CATH LAB PROCEDURE      DIALYSIS FISTULA CREATION Left 10/30/2017    LEFT BRACHIAL CEPHALIC FISTULA    DIALYSIS FISTULA CREATION Left 3/27/2019    LIGATION  AV FISTULA performed by Doron Heath MD at [facility], COLON, DIAGNOSTIC      OTHER SURGICAL HISTORY  02/01/2017    laparoscopic cholecystectomy with intraoperative cholangiogram    OTHER SURGICAL HISTORY  2018    PORT PLACEMENT  - vas cath    OTHER SURGICAL HISTORY Bilateral 06/26/2018    laprascopic peritoneal dialysis catheter placement    OTHER SURGICAL HISTORY Right 09/2018    peritoneal dialysis port placed on right side of abdomen    OTHER SURGICAL HISTORY  05/28/2019    PTA/Stenting R External Iliac artery    NV LAP INSERTION TUNNELED INTRAPERITONEAL CATHETER N/A 9/21/2018    LAPAROSCOPIC PERITONEAL DIALYSIS CATHETER REPLACEMENT performed by Vu Parry MD at [address] [place] [address]  01/06/2016    UPPER GASTROINTESTINAL ENDOSCOPY 2017    possible candida, otherwise normal appearing    VASCULAR SURGERY  aprx 2 years ago    2 stents placed, each side of groin        Family History   Problem Relation Age of Onset    Diabetes Mother     Heart Disease Father     Kidney Disease Sister         stage 4-kidney failure    Cancer Sister     Heart Disease Sister     Obesity Sister     Cancer Sister     Heart Disease Sister     Obesity Sister     Alcohol Abuse Brother         Social History     Tobacco Use    Smoking status: Current Every Day Smoker     Packs/day: 0.50     Years: 33.00     Pack years: 16.50     Types: Cigarettes     Last attempt to quit: 2020     Years since quittin.5    Smokeless tobacco: Never Used   Substance Use Topics    Alcohol use: Not Currently     Alcohol/week: 0.0 standard drinks     Comment: occ        Allergies   Allergen Reactions    Morphine Nausea And Vomiting               Physical Exam:  Blood pressure 121/78, pulse 80, temperature 98 °F (36.7 °C), temperature source Axillary, resp. rate 21, height 5' 9\" (1.753 m), weight 268 lb 11.9 oz (121.9 kg), SpO2 98 %.'     Constitutional:  No acute distress. On mechanical vent support  HENT: Endotracheal tube present. No thyromegaly. Eyes:  Conjunctivae are normal. Pupils equal, round, and reactive to light. No scleral icterus. Neck: . No tracheal deviation present. No obvious thyroid mass. Short enlarged neck  Cardiovascular: Normal rate, regular rhythm, normal heart sounds. No right ventricular heave some lower extremity edema. Pulmonary/Chest: No wheezes. Bilateral rales. Chest wall is not dull to percussion. No accessory muscle usage or stridor. Abdominal: Soft. Bowel sounds present. No distension or hernia. No tenderness. Musculoskeletal: No cyanosis. No clubbing. No obvious joint deformity. Lymphadenopathy: No cervical or supraclavicular adenopathy. Skin: Skin is warm and dry.  No rash or nodules on the exposed extremities. Neurologic: Intubated and sedated        Results:  CBC:   Recent Labs     11/29/21  1426 11/30/21  0120 11/30/21  0733 11/30/21  0808 11/30/21  0925   WBC 11.2*  --  13.6*  --   --    HGB 10.0*   < > 10.6* 10.2* 10.1*   HCT 31.3*  --  33.2*  --   --    MCV 90.7  --  91.6  --   --      --  219  --   --     < > = values in this interval not displayed. BMP:   Recent Labs     11/29/21  1426 11/30/21  0146 11/30/21  0420   *  --  129*   K 5.2* 6.7* 7.7*   CL 96*  --  94*   CO2 19*  --  17*   PHOS  --   --  7.9*   BUN 88*  --  100*   CREATININE 7.6*  --  7.3*     LIVER PROFILE:   Recent Labs     11/29/21  1426   AST 12*   ALT 16   BILITOT <0.2   ALKPHOS 128       Imaging:  I have reviewed radiology images personally. XR CHEST PORTABLE   Final Result   1. Appropriate line and tube positioning without visualization of the tip of   the enteric tube. 2. Bibasilar hazy opacities likely represent atelectasis and layering pleural   fluid. Superimposed parenchymal process such as pneumonia cannot be excluded. XR ABDOMEN (KUB) (SINGLE AP VIEW)   Final Result      XR ABDOMEN (KUB) (SINGLE AP VIEW)   Final Result   1. The endotracheal tube tip is 4.5 cm above the maryam. 2. The enteric tube tip is in the stomach though the side hole appears to be   in the esophagus. Recommend advancing the enteric tube 6 cm. XR CHEST PORTABLE   Final Result   1. The endotracheal tube tip is 4.5 cm above the maryam. 2. The enteric tube tip is in the stomach though the side hole appears to be   in the esophagus. Recommend advancing the enteric tube 6 cm. XR CHEST PORTABLE   Final Result   1. Pulmonary vascular congestion. 2. Left basilar opacity likely represents a combination of small pleural   fluid and atelectasis. Likely trace right pleural fluid. Superimposed   infectious parenchymal process cannot be excluded.            XR ABDOMEN (KUB) (SINGLE AP VIEW)    Result Date: 11/30/2021  EXAMINATION: ONE SUPINE XRAY VIEW(S) OF THE ABDOMEN 11/30/2021 10:26 am COMPARISON: None. HISTORY: ORDERING SYSTEM PROVIDED HISTORY: OG placement TECHNOLOGIST PROVIDED HISTORY: Reason for exam:->OG placement Reason for Exam: OG placement Acuity: Acute Type of Exam: Initial FINDINGS: Enteric tube is been slightly advanced. The tip courses off the inferior margin of the film likely in [place] pyloric position. IMPRESSION As above     XR ABDOMEN (KUB) (SINGLE AP VIEW)    Result Date: 11/30/2021  EXAMINATION: ONE XRAY VIEW OF THE CHEST; ONE SUPINE XRAY VIEW(S) OF THE ABDOMEN 11/30/2021 3:06 am COMPARISON: 11/29/2021 HISTORY: ORDERING SYSTEM PROVIDED HISTORY: ETT placement TECHNOLOGIST PROVIDED HISTORY: Reason for exam:->ETT placement Reason for Exam: ett Acuity: Acute Type of Exam: Initial FINDINGS: Initial images of the chest demonstrate an endotracheal tube with the tip 4.5 cm above the maryam. Left internal jugular central venous catheter is again seen. Bilateral airspace opacities are stable. The heart size is at the upper limits of normal.  Aortic valve replacement is again seen. There is no discernible pneumothorax. Subsequent images were obtained of the abdomen demonstrating an enteric tube with tip in the stomach. The side hole appears to be above the diaphragm. The stomach is distended. 1. The endotracheal tube tip is 4.5 cm above the maryam. 2. The enteric tube tip is in the stomach though the side hole appears to be in the esophagus. Recommend advancing the enteric tube 6 cm.      XR CHEST PORTABLE    Result Date: 11/30/2021  EXAMINATION: ONE XRAY VIEW OF THE CHEST 11/30/2021 10:20 am COMPARISON: Chest radiograph 11/30/2020 HISTORY: ORDERING SYSTEM PROVIDED HISTORY: EtT placement TECHNOLOGIST PROVIDED HISTORY: Reason for exam:->EtT placement Reason for Exam: EtT placement Acuity: Acute Type of Exam: Initial FINDINGS: A tracheal tube projecting 7 cm superior the maryam enteric tube extends below the diaphragm of field of view. Left approach HD catheter tip projects right atrium. Stent valve is again noted. Hazy opacities are seen at the lung bases bilaterally. More consolidative opacity noted at the left lung base. No discrete pneumothorax. Cardiomediastinal silhouette is stable. 1. Appropriate line and tube positioning without visualization of the tip of the enteric tube. 2. Bibasilar hazy opacities likely represent atelectasis and layering pleural fluid. Superimposed parenchymal process such as pneumonia cannot be excluded. XR CHEST PORTABLE    Result Date: 11/30/2021  EXAMINATION: ONE XRAY VIEW OF THE CHEST; ONE SUPINE XRAY VIEW(S) OF THE ABDOMEN 11/30/2021 3:06 am COMPARISON: 11/29/2021 HISTORY: ORDERING SYSTEM PROVIDED HISTORY: ETT placement TECHNOLOGIST PROVIDED HISTORY: Reason for exam:->ETT placement Reason for Exam: ett Acuity: Acute Type of Exam: Initial FINDINGS: Initial images of the chest demonstrate an endotracheal tube with the tip 4.5 cm above the maryam. Left internal jugular central venous catheter is again seen. Bilateral airspace opacities are stable. The heart size is at the upper limits of normal.  Aortic valve replacement is again seen. There is no discernible pneumothorax. Subsequent images were obtained of the abdomen demonstrating an enteric tube with tip in the stomach. The side hole appears to be above the diaphragm. The stomach is distended. 1. The endotracheal tube tip is 4.5 cm above the maryam. 2. The enteric tube tip is in the stomach though the side hole appears to be in the esophagus. Recommend advancing the enteric tube 6 cm.      XR CHEST PORTABLE    Result Date: 11/29/2021  EXAMINATION: ONE XRAY VIEW OF THE CHEST 11/29/2021 2:19 pm COMPARISON: Chest radiograph 10/11/2021 HISTORY: ORDERING SYSTEM PROVIDED HISTORY: SOB TECHNOLOGIST PROVIDED HISTORY: Reason for exam:->SOB Reason for Exam: cp sob Acuity: Acute Type of Exam: Initial FINDINGS: There is a left basilar opacity. Hazy opacity noted at the right lung base blunting of the right costophrenic angle. No discrete pneumothorax. There is cephalization of flow. Cardiomediastinal silhouette is prominent but unchanged. Left approach HD catheter is unchanged. Aortic stent valve noted. 1. Pulmonary vascular congestion. 2. Left basilar opacity likely represents a combination of small pleural fluid and atelectasis. Likely trace right pleural fluid. Superimposed infectious parenchymal process cannot be excluded. Results for Tiffany Petersen (MRN [id number]) as of 11/30/2021 22:37   Ref. Range 11/29/2021 23:15   Rapid Influenza A Ag Latest Ref Range: Negative  Negative   Rapid Influenza B Ag Latest Ref Range: Negative  Negative   RAPID INFLUENZA A/B ANTIGENS Unknown Rpt   SARS-CoV-2, NAAT Latest Ref Range: Not Detected  Not Detected     Results for Tiffany Petersen (MRN [id number]) as of 11/30/2021 22:37   Ref.  Range 11/30/2021 02:29 11/30/2021 05:30 11/30/2021 06:12 11/30/2021 08:08 11/30/2021 09:25   O2 Therapy Unknown Unknown Unknown Unknown     Hemoglobin, Art, Extended Latest Ref Range: 13.5 - 17.5 g/dL 11.6 (L) 11.2 (L) 12.6 (L)     pH, Arterial Latest Ref Range: 7.350 - 7.450  6.970 (LL) 7.082 (LL) 7.137 (LL) 7.218 (L) 7.242 (L)   pCO2, Arterial Latest Ref Range: 35.0 - 45.0 mm Hg 97.9 (HH) 82.5 (HH) 66.3 (H) 53.4 (H) 52.4 (H)   pO2, Arterial Latest Ref Range: 75.0 - 108.0 mm Hg 89.5 36.7 (LL) 94.2 112.0 (H) 93.8   HCO3, Arterial Latest Ref Range: 21.0 - 29.0 mmol/L 22.0 24.0 21.9 21.8 22.6   TCO2 (calc), Art Latest Ref Range: Not Established mmol/L 25.0 26.6 23.9 23 24   Base Excess, Arterial Latest Ref Range: -3 - 3  -11.1 (L) -7.0 (L) -8.0 (L) -6 (L) -5 (L)   O2 Sat, Arterial Latest Ref Range: 93 - 100 % 92.9 (L) 60.4 (L) 95.4 97 96   Methemoglobin, Arterial Latest Ref Range: <1.5 % 0.6 0.2 0.4     Carboxyhgb, Arterial Latest Ref Range: 0.0 - 1.5 % 0.2 0.6 0.2     Sample Type Unknown    ART ART       Echocardiogram:   Summary   Technically difficult examination. Image quality limits accurate wall motion   and ejection fraction analysis. The left ventricular systolic function is moderately reduced with an   ejection fraction of 40-45 %. Mild concentric left ventricular hypertrophy. Definity contrast administered with no evidence of left ventricular mass or   thrombus noted. Moderate global hypokinesis with regional variation. Grade II diastolic dysfunction with elevated filing pressure. Compared to last echo on 1/5/2021 (EF 55%), left ventricle systolic function   has decreased. The left atrium is mildly dilated. Individual aortic valve leaflets are not clearly visualized. Normally functioning TAVR 29 mm Langford vanita S3 bioprosthetic valve in   aortic position appears well seated with a max velocity of 2.27 m/sec,   maximum gradient of 21 mmHg and a mean gradient of 13 mmHg. Trace aortic valve regurgitation. Mild mitral regurgitation. A bubble study was performed and fails to show evidence of shunting. PFT: None available in the epic        Assessment:  Active Problems:    S/P TAVR (transcatheter aortic valve replacement)    Nonischemic cardiomyopathy (HCC)    ZAINAB on CPAP    Hyperkalemia    ESRD (end stage renal disease) on dialysis ([facility])    CHF (congestive heart failure), NYHA class I, acute on chronic, combined ([facility])    Acute respiratory failure with hypercapnia ([facility])    Acute pulmonary edema ([facility])    Obesity, Class II, BMI 35-39.9    Grade II diastolic dysfunction    Shock circulatory ([facility])    Smoker    DM (diabetes mellitus), secondary uncontrolled ([facility])    Normocytic normochromic anemia  Resolved Problems:    * No resolved hospital problems.  *          Plan:   · Ventilatory support to keep saturation being 90-94% only  · Ventilator settings and waveforms reviewed  · Ventilator changes made   · Pulmonary toilet  · IV sedation to maintain patient ventilator synchrony  · Titration of sedation as per RASS scores  · Patient to get dialysis this morning and patient required transient Levophed to maintain hemodynamics with dialysis needs to be trended  · Management of hyperkalemia as per nephrology  · Patient does not need any antibiotics from pulmonary standpoint of view  · Patient's echocardiogram in the recent past was reviewed  · Lantus insulin as per blood glucose monitoring as per IM  · Blood glucose monitoring with sliding cell insulin  · Trend hemodynamics and cardiac rhythm closely  · Enteral feeds as per metabolic support  · PUD and DVT prophylaxis    Case discussed with ICU team    Patient's clinical status remains critical and precarious and needs to monitor closely in the ICU    Critical care time spent with patient was 35 minutes exclusive of any procedures            Electronically signed by:  Jonatan Rosa MD    11/30/2021    10:47 PM.

## 2021-11-30 NOTE — PROGRESS NOTES
11/30/21 1635   Vent Information   Equipment Changed HME   Vent Type 980   Vent Mode AC/VC   Vt Ordered 550 mL   Rate Set 20 bmp   Peak Flow 44 L/min   Pressure Support 0 cmH20   FiO2  30 %   SpO2 99 %   SpO2/FiO2 ratio 330   Sensitivity 3   PEEP/CPAP 5   Vent Patient Data   Peak Inspiratory Pressure 18 cmH2O   Mean Airway Pressure 9 cmH20   Rate Measured 20 br/min   Vt Exhaled 562 mL   Minute Volume 11.4 Liters   I:E Ratio 1:1.20   Cough/Sputum   Sputum How Obtained Oral; Endotracheal; Suctioned   Cough Non-productive   Sputum Amount Small   Sputum Color Cloudy; Clear   Tenacity Thick;  Thin   Spontaneous Breathing Trial (SBT) RT Doc   Pulse 77   Breath Sounds   Right Upper Lobe Diminished   Right Middle Lobe Diminished   Right Lower Lobe Diminished   Left Upper Lobe Diminished   Left Lower Lobe Diminished   Additional Respiratory  Assessments   Resp 21   Position Semi-Greene's   Alarm Settings   High Pressure Alarm 45 cmH2O   Low Minute Volume Alarm 2 L/min   High Respiratory Rate 40 br/min   Low Exhaled Vt  200 mL   Patient Observation   Observations hhn with aerogen   ETT (adult)   Placement Date/Time: 11/30/21 0135   Tube Size: 8 mm  Secured at: 24 cm   Secured at 24 cm   Measured From Lips   ET Placement Right   Secured By Commercial tube wills   Site Condition Dry   Cuff Pressure 32 cm H2O

## 2021-11-30 NOTE — ED NOTES
1757 - PS to Dr Willard Landa at [facility] per nephro consult  Re: pt sees Dr Raina Mathews, needs dialysis  Masoud Solorio returned call @[phone]       [facility]  11/29/21 1900

## 2021-12-01 LAB
ALBUMIN SERPL-MCNC: 3.2 G/DL (ref 3.4–5)
ANION GAP SERPL CALCULATED.3IONS-SCNC: 16 MMOL/L (ref 3–16)
BASE EXCESS ARTERIAL: -1 (ref -3–3)
BUN BLDV-MCNC: 52 MG/DL (ref 7–20)
CALCIUM SERPL-MCNC: 8.6 MG/DL (ref 8.3–10.6)
CHLORIDE BLD-SCNC: 98 MMOL/L (ref 99–110)
CO2: 22 MMOL/L (ref 21–32)
CREAT SERPL-MCNC: 5.6 MG/DL (ref 0.9–1.3)
GFR AFRICAN AMERICAN: 13
GFR NON-AFRICAN AMERICAN: 11
GLUCOSE BLD-MCNC: 140 MG/DL (ref 70–99)
GLUCOSE BLD-MCNC: 140 MG/DL (ref 70–99)
GLUCOSE BLD-MCNC: 154 MG/DL (ref 70–99)
GLUCOSE BLD-MCNC: 158 MG/DL (ref 70–99)
GLUCOSE BLD-MCNC: 164 MG/DL (ref 70–99)
GLUCOSE BLD-MCNC: 184 MG/DL (ref 70–99)
GLUCOSE BLD-MCNC: 193 MG/DL (ref 70–99)
HCO3 ARTERIAL: 24.5 MMOL/L (ref 21–29)
HCT VFR BLD CALC: 26.3 % (ref 40.5–52.5)
HEMOGLOBIN: 8.8 G/DL (ref 13.5–17.5)
MAGNESIUM: 2 MG/DL (ref 1.8–2.4)
MCH RBC QN AUTO: 29.8 PG (ref 26–34)
MCHC RBC AUTO-ENTMCNC: 33.5 G/DL (ref 31–36)
MCV RBC AUTO: 89.1 FL (ref 80–100)
O2 SAT, ARTERIAL: 96 % (ref 93–100)
PCO2 ARTERIAL: 40.2 MM HG (ref 35–45)
PDW BLD-RTO: 16.1 % (ref 12.4–15.4)
PERFORMED ON: ABNORMAL
PERFORMED ON: NORMAL
PH ARTERIAL: 7.39 (ref 7.35–7.45)
PHOSPHORUS: 5.1 MG/DL (ref 2.5–4.9)
PLATELET # BLD: 222 K/UL (ref 135–450)
PMV BLD AUTO: 8.6 FL (ref 5–10.5)
PO2 ARTERIAL: 79.7 MM HG (ref 75–108)
POC SAMPLE TYPE: NORMAL
POTASSIUM SERPL-SCNC: 3.6 MMOL/L (ref 3.5–5.1)
RBC # BLD: 2.95 M/UL (ref 4.2–5.9)
SODIUM BLD-SCNC: 136 MMOL/L (ref 136–145)
TCO2 ARTERIAL: 26 MMOL/L
WBC # BLD: 10.6 K/UL (ref 4–11)

## 2021-12-01 PROCEDURE — 2580000003 HC RX 258: Performed by: INTERNAL MEDICINE

## 2021-12-01 PROCEDURE — 85027 COMPLETE CBC AUTOMATED: CPT

## 2021-12-01 PROCEDURE — 6360000002 HC RX W HCPCS: Performed by: INTERNAL MEDICINE

## 2021-12-01 PROCEDURE — 6370000000 HC RX 637 (ALT 250 FOR IP): Performed by: INTERNAL MEDICINE

## 2021-12-01 PROCEDURE — 99291 CRITICAL CARE FIRST HOUR: CPT | Performed by: INTERNAL MEDICINE

## 2021-12-01 PROCEDURE — 2700000000 HC OXYGEN THERAPY PER DAY

## 2021-12-01 PROCEDURE — 94003 VENT MGMT INPAT SUBQ DAY: CPT

## 2021-12-01 PROCEDURE — 94761 N-INVAS EAR/PLS OXIMETRY MLT: CPT

## 2021-12-01 PROCEDURE — 6370000000 HC RX 637 (ALT 250 FOR IP): Performed by: NURSE PRACTITIONER

## 2021-12-01 PROCEDURE — 36415 COLL VENOUS BLD VENIPUNCTURE: CPT

## 2021-12-01 PROCEDURE — 83735 ASSAY OF MAGNESIUM: CPT

## 2021-12-01 PROCEDURE — 80069 RENAL FUNCTION PANEL: CPT

## 2021-12-01 PROCEDURE — 82803 BLOOD GASES ANY COMBINATION: CPT

## 2021-12-01 PROCEDURE — 90935 HEMODIALYSIS ONE EVALUATION: CPT

## 2021-12-01 PROCEDURE — 94640 AIRWAY INHALATION TREATMENT: CPT

## 2021-12-01 PROCEDURE — 6370000000 HC RX 637 (ALT 250 FOR IP): Performed by: STUDENT IN AN ORGANIZED HEALTH CARE EDUCATION/TRAINING PROGRAM

## 2021-12-01 PROCEDURE — 2060000000 HC ICU INTERMEDIATE R&B

## 2021-12-01 RX ORDER — LIDOCAINE 4 G/G
1 PATCH TOPICAL DAILY
Status: DISCONTINUED | OUTPATIENT
Start: 2021-12-01 | End: 2021-12-03 | Stop reason: HOSPADM

## 2021-12-01 RX ORDER — DEXTROSE MONOHYDRATE 50 MG/ML
100 INJECTION, SOLUTION INTRAVENOUS PRN
Status: DISCONTINUED | OUTPATIENT
Start: 2021-12-01 | End: 2021-12-03 | Stop reason: HOSPADM

## 2021-12-01 RX ORDER — OXYCODONE AND ACETAMINOPHEN 7.5; 325 MG/1; MG/1
1 TABLET ORAL EVERY 6 HOURS PRN
Status: DISCONTINUED | OUTPATIENT
Start: 2021-12-01 | End: 2021-12-03 | Stop reason: HOSPADM

## 2021-12-01 RX ORDER — DEXTROSE MONOHYDRATE 25 G/50ML
12.5 INJECTION, SOLUTION INTRAVENOUS PRN
Status: DISCONTINUED | OUTPATIENT
Start: 2021-12-01 | End: 2021-12-03 | Stop reason: HOSPADM

## 2021-12-01 RX ORDER — INSULIN GLARGINE 100 [IU]/ML
20 INJECTION, SOLUTION SUBCUTANEOUS NIGHTLY
Status: DISCONTINUED | OUTPATIENT
Start: 2021-12-01 | End: 2021-12-03 | Stop reason: HOSPADM

## 2021-12-01 RX ORDER — NICOTINE POLACRILEX 4 MG
15 LOZENGE BUCCAL PRN
Status: DISCONTINUED | OUTPATIENT
Start: 2021-12-01 | End: 2021-12-03 | Stop reason: HOSPADM

## 2021-12-01 RX ADMIN — HEPARIN SODIUM 5000 UNITS: 5000 INJECTION INTRAVENOUS; SUBCUTANEOUS at 16:26

## 2021-12-01 RX ADMIN — INSULIN LISPRO 3 UNITS: 100 INJECTION, SOLUTION INTRAVENOUS; SUBCUTANEOUS at 01:22

## 2021-12-01 RX ADMIN — HEPARIN SODIUM 5000 UNITS: 5000 INJECTION INTRAVENOUS; SUBCUTANEOUS at 20:19

## 2021-12-01 RX ADMIN — PRAVASTATIN SODIUM 40 MG: 40 TABLET ORAL at 20:19

## 2021-12-01 RX ADMIN — GABAPENTIN 100 MG: 100 CAPSULE ORAL at 20:18

## 2021-12-01 RX ADMIN — INSULIN LISPRO 3 UNITS: 100 INJECTION, SOLUTION INTRAVENOUS; SUBCUTANEOUS at 05:05

## 2021-12-01 RX ADMIN — HEPARIN SODIUM 4100 UNITS: 1000 INJECTION INTRAVENOUS; SUBCUTANEOUS at 16:02

## 2021-12-01 RX ADMIN — SODIUM CHLORIDE, PRESERVATIVE FREE 10 ML: 5 INJECTION INTRAVENOUS at 21:20

## 2021-12-01 RX ADMIN — INSULIN GLARGINE 20 UNITS: 100 INJECTION, SOLUTION SUBCUTANEOUS at 20:19

## 2021-12-01 RX ADMIN — Medication 15 ML: at 09:45

## 2021-12-01 RX ADMIN — IPRATROPIUM BROMIDE AND ALBUTEROL SULFATE 1 AMPULE: .5; 3 SOLUTION RESPIRATORY (INHALATION) at 15:47

## 2021-12-01 RX ADMIN — EPOETIN ALFA-EPBX 3000 UNITS: 3000 INJECTION, SOLUTION INTRAVENOUS; SUBCUTANEOUS at 14:56

## 2021-12-01 RX ADMIN — SODIUM CHLORIDE, PRESERVATIVE FREE 10 ML: 5 INJECTION INTRAVENOUS at 09:45

## 2021-12-01 RX ADMIN — IPRATROPIUM BROMIDE AND ALBUTEROL SULFATE 1 AMPULE: .5; 3 SOLUTION RESPIRATORY (INHALATION) at 12:06

## 2021-12-01 RX ADMIN — OXYCODONE AND ACETAMINOPHEN 1 TABLET: 7.5; 325 TABLET ORAL at 20:18

## 2021-12-01 RX ADMIN — INSULIN LISPRO 3 UNITS: 100 INJECTION, SOLUTION INTRAVENOUS; SUBCUTANEOUS at 20:20

## 2021-12-01 RX ADMIN — CARBOXYMETHYLCELLULOSE SODIUM 1 DROP: 10 GEL OPHTHALMIC at 01:13

## 2021-12-01 RX ADMIN — CARVEDILOL 3.12 MG: 3.12 TABLET, FILM COATED ORAL at 16:26

## 2021-12-01 RX ADMIN — DULOXETINE HYDROCHLORIDE 30 MG: 30 CAPSULE, DELAYED RELEASE ORAL at 09:51

## 2021-12-01 RX ADMIN — INSULIN LISPRO 3 UNITS: 100 INJECTION, SOLUTION INTRAVENOUS; SUBCUTANEOUS at 09:47

## 2021-12-01 RX ADMIN — HEPARIN SODIUM 5000 UNITS: 5000 INJECTION INTRAVENOUS; SUBCUTANEOUS at 06:59

## 2021-12-01 RX ADMIN — CLOPIDOGREL BISULFATE 75 MG: 75 TABLET ORAL at 09:51

## 2021-12-01 RX ADMIN — FENTANYL CITRATE 125 MCG/HR: 50 INJECTION, SOLUTION INTRAMUSCULAR; INTRAVENOUS at 06:13

## 2021-12-01 RX ADMIN — IPRATROPIUM BROMIDE AND ALBUTEROL SULFATE 1 AMPULE: .5; 3 SOLUTION RESPIRATORY (INHALATION) at 09:22

## 2021-12-01 RX ADMIN — MUPIROCIN: 20 OINTMENT TOPICAL at 09:45

## 2021-12-01 RX ADMIN — INSULIN LISPRO 3 UNITS: 100 INJECTION, SOLUTION INTRAVENOUS; SUBCUTANEOUS at 23:28

## 2021-12-01 RX ADMIN — IPRATROPIUM BROMIDE AND ALBUTEROL SULFATE 1 AMPULE: .5; 3 SOLUTION RESPIRATORY (INHALATION) at 20:02

## 2021-12-01 RX ADMIN — GABAPENTIN 100 MG: 100 CAPSULE ORAL at 16:26

## 2021-12-01 RX ADMIN — CARBOXYMETHYLCELLULOSE SODIUM 1 DROP: 10 GEL OPHTHALMIC at 06:15

## 2021-12-01 RX ADMIN — INSULIN LISPRO 3 UNITS: 100 INJECTION, SOLUTION INTRAVENOUS; SUBCUTANEOUS at 13:30

## 2021-12-01 RX ADMIN — QUETIAPINE FUMARATE 50 MG: 25 TABLET ORAL at 20:18

## 2021-12-01 RX ADMIN — INSULIN LISPRO 3 UNITS: 100 INJECTION, SOLUTION INTRAVENOUS; SUBCUTANEOUS at 16:31

## 2021-12-01 RX ADMIN — GABAPENTIN 100 MG: 100 CAPSULE ORAL at 09:51

## 2021-12-01 RX ADMIN — FAMOTIDINE 20 MG: 20 TABLET ORAL at 09:51

## 2021-12-01 RX ADMIN — DOCUSATE SODIUM 100 MG: 50 LIQUID ORAL at 09:51

## 2021-12-01 RX ADMIN — Medication 1 LOZENGE: at 15:03

## 2021-12-01 RX ADMIN — DOCUSATE SODIUM 100 MG: 50 LIQUID ORAL at 20:18

## 2021-12-01 ASSESSMENT — PULMONARY FUNCTION TESTS
PIF_VALUE: 22
PIF_VALUE: 11
PIF_VALUE: 22
PIF_VALUE: 22
PIF_VALUE: 17
PIF_VALUE: 23
PIF_VALUE: 23
PIF_VALUE: 21
PIF_VALUE: 17
PIF_VALUE: 23
PIF_VALUE: 27
PIF_VALUE: 17
PIF_VALUE: 17
PIF_VALUE: 11
PIF_VALUE: 22
PIF_VALUE: 27
PIF_VALUE: 22

## 2021-12-01 ASSESSMENT — PAIN SCALES - GENERAL
PAINLEVEL_OUTOF10: 3
PAINLEVEL_OUTOF10: 10
PAINLEVEL_OUTOF10: 0

## 2021-12-01 NOTE — PROGRESS NOTES
Tolerated HD fairly well. Still on 2 mcg of Levo. Alert. Very labile BP with sedation and HD. Sleeps most of time on just Fentanyl. Wife updated bedside. Low urine output.

## 2021-12-01 NOTE — PROGRESS NOTES
12/01/21 0054   Vent Information   $Ventilation $Subsequent Day   Skin Assessment Clean, dry, & intact   Vent Type 980   Vent Mode AC/VC   Vt Ordered 550 mL   Rate Set 20 bmp   Peak Flow 44 L/min   Pressure Support 0 cmH20   FiO2  30 %   Sensitivity 3   PEEP/CPAP 5   I Time/ I Time % 0.6 s   Humidification Source HME   Vent Patient Data   Peak Inspiratory Pressure 22 cmH2O   Mean Airway Pressure 13 cmH20   Rate Measured 20 br/min   Vt Exhaled 562 mL   Minute Volume 11.2 Liters   I:E Ratio 1:1.20   Cough/Sputum   Sputum How Obtained Endotracheal   Cough Productive   Sputum Amount Small   Sputum Color Cloudy   Tenacity Thick   Spontaneous Breathing Trial (SBT) RT Doc   Pulse 83   Breath Sounds   Right Upper Lobe Diminished   Right Middle Lobe Diminished   Right Lower Lobe Diminished   Left Upper Lobe Diminished   Left Lower Lobe Diminished   Additional Respiratory  Assessments   Resp 20   Position Semi-Greene's   Cuff Pressure (cm H2O) 30 cm H2O   Alarm Settings   High Pressure Alarm 45 cmH2O   Low Minute Volume Alarm 2 L/min   High Respiratory Rate 40 br/min   Low Exhaled Vt  200 mL   Patient Observation   Observations Ambu @ bedside.  ETT moved to [place]   ETT (adult)   Placement Date/Time: 11/30/21 0135   Tube Size: 8 mm  Secured at: 24 cm   Secured at 24 cm   Measured From [address] [place] [address],[address] By Commercial tube wills   Site Condition Dry   Cuff Pressure 30 cm H2O

## 2021-12-01 NOTE — PROGRESS NOTES
INPATIENT PULMONARY CRITICAL CARE PROGRESS NOTE      Reason for visit    acute respiratory failure    SUBJECTIVE: Patient when seen this morning continues to be critically ill on mechanical vent support, patient was on 30% oxygen and PEEP of 5 to maintain saturation, patient got 1 cycle of dialysis yesterday, patient was on IV sedation to maintain patient ventilator synchrony, patient was afebrile and patient's blood pressure was slightly on the higher side, patient's blood sugars were slightly on the higher side and not optimally controlled, patient had sinus rhythm on the monitor, patient had low urine output; patient does make urine in spite of being dependent on dialysis, patient's cumulative fluid balance was -2.6 L, no other pertinent review of system could be obtained because of patient clinical status which remains critical            Physical Exam:  Blood pressure (!) 131/46, pulse 81, temperature 98.2 °F (36.8 °C), temperature source Bladder, resp. rate 20, height 5' 9\" (1.753 m), weight 267 lb 3.2 oz (121.2 kg), SpO2 97 %.'     Constitutional:  No acute distress. On mechanical vent support  HENT: Endotracheal tube present. No thyromegaly. Eyes:  Conjunctivae are normal. Pupils equal, round, and reactive to light. No scleral icterus. Neck: . No tracheal deviation present. No obvious thyroid mass. Short enlarged neck  Cardiovascular: Normal rate, regular rhythm, normal heart sounds. No right ventricular heave some lower extremity edema. Pulmonary/Chest: No wheezes. Bilateral rales. Chest wall is not dull to percussion. No accessory muscle usage or stridor. Abdominal: Soft. Bowel sounds present. No distension or hernia. No tenderness. Musculoskeletal: No cyanosis. No clubbing. No obvious joint deformity. Lymphadenopathy: No cervical or supraclavicular adenopathy. Skin: Skin is warm and dry. No rash or nodules on the exposed extremities.   Neurologic: Intubated and sedated           Results:  CBC:   Recent Labs     11/29/21  1426 11/30/21  0120 11/30/21  0733 11/30/21  0733 11/30/21  0808 11/30/21  0925 12/01/21  0417   WBC 11.2*  --  13.6*  --   --   --  10.6   HGB 10.0*   < > 10.6*   < > 10.2* 10.1* 8.8*   HCT 31.3*  --  33.2*  --   --   --  26.3*   MCV 90.7  --  91.6  --   --   --  89.1     --  219  --   --   --  222    < > = values in this interval not displayed. BMP:   Recent Labs     11/29/21  1426 11/30/21  0146 11/30/21  0420 12/01/21  0417   *  --  129* 136   K 5.2* 6.7* 7.7* 3.6   CL 96*  --  94* 98*   CO2 19*  --  17* 22   PHOS  --   --  7.9* 5.1*   BUN 88*  --  100* 52*   CREATININE 7.6*  --  7.3* 5.6*     LIVER PROFILE:   Recent Labs     11/29/21  1426   AST 12*   ALT 16   BILITOT <0.2   ALKPHOS 128       Imaging:  I have reviewed radiology images personally. XR CHEST PORTABLE   Final Result   1. Appropriate line and tube positioning without visualization of the tip of   the enteric tube. 2. Bibasilar hazy opacities likely represent atelectasis and layering pleural   fluid. Superimposed parenchymal process such as pneumonia cannot be excluded. XR ABDOMEN (KUB) (SINGLE AP VIEW)   Final Result      XR ABDOMEN (KUB) (SINGLE AP VIEW)   Final Result   1. The endotracheal tube tip is 4.5 cm above the maryam. 2. The enteric tube tip is in the stomach though the side hole appears to be   in the esophagus. Recommend advancing the enteric tube 6 cm. XR CHEST PORTABLE   Final Result   1. The endotracheal tube tip is 4.5 cm above the maryam. 2. The enteric tube tip is in the stomach though the side hole appears to be   in the esophagus. Recommend advancing the enteric tube 6 cm. XR CHEST PORTABLE   Final Result   1. Pulmonary vascular congestion. 2. Left basilar opacity likely represents a combination of small pleural   fluid and atelectasis. Likely trace right pleural fluid.   Superimposed   infectious parenchymal process cannot be excluded. XR ABDOMEN (KUB) (SINGLE AP VIEW)    Result Date: 11/30/2021  EXAMINATION: ONE SUPINE XRAY VIEW(S) OF THE ABDOMEN 11/30/2021 10:26 am COMPARISON: None. HISTORY: ORDERING SYSTEM PROVIDED HISTORY: OG placement TECHNOLOGIST PROVIDED HISTORY: Reason for exam:->OG placement Reason for Exam: OG placement Acuity: Acute Type of Exam: Initial FINDINGS: Enteric tube is been slightly advanced. The tip courses off the inferior margin of the film likely in [place] pyloric position. IMPRESSION As above     XR ABDOMEN (KUB) (SINGLE AP VIEW)    Result Date: 11/30/2021  EXAMINATION: ONE XRAY VIEW OF THE CHEST; ONE SUPINE XRAY VIEW(S) OF THE ABDOMEN 11/30/2021 3:06 am COMPARISON: 11/29/2021 HISTORY: ORDERING SYSTEM PROVIDED HISTORY: ETT placement TECHNOLOGIST PROVIDED HISTORY: Reason for exam:->ETT placement Reason for Exam: ett Acuity: Acute Type of Exam: Initial FINDINGS: Initial images of the chest demonstrate an endotracheal tube with the tip 4.5 cm above the maryam. Left internal jugular central venous catheter is again seen. Bilateral airspace opacities are stable. The heart size is at the upper limits of normal.  Aortic valve replacement is again seen. There is no discernible pneumothorax. Subsequent images were obtained of the abdomen demonstrating an enteric tube with tip in the stomach. The side hole appears to be above the diaphragm. The stomach is distended. 1. The endotracheal tube tip is 4.5 cm above the maryam. 2. The enteric tube tip is in the stomach though the side hole appears to be in the esophagus. Recommend advancing the enteric tube 6 cm.      XR CHEST PORTABLE    Result Date: 11/30/2021  EXAMINATION: ONE XRAY VIEW OF THE CHEST 11/30/2021 10:20 am COMPARISON: Chest radiograph 11/30/2020 HISTORY: ORDERING SYSTEM PROVIDED HISTORY: EtT placement TECHNOLOGIST PROVIDED HISTORY: Reason for exam:->EtT placement Reason for Exam: EtT placement Acuity: Acute Type of Exam: Initial FINDINGS: A tracheal tube projecting 7 cm superior the maryam enteric tube extends below the diaphragm of field of view. Left approach HD catheter tip projects right atrium. Stent valve is again noted. Hazy opacities are seen at the lung bases bilaterally. More consolidative opacity noted at the left lung base. No discrete pneumothorax. Cardiomediastinal silhouette is stable. 1. Appropriate line and tube positioning without visualization of the tip of the enteric tube. 2. Bibasilar hazy opacities likely represent atelectasis and layering pleural fluid. Superimposed parenchymal process such as pneumonia cannot be excluded. XR CHEST PORTABLE    Result Date: 11/30/2021  EXAMINATION: ONE XRAY VIEW OF THE CHEST; ONE SUPINE XRAY VIEW(S) OF THE ABDOMEN 11/30/2021 3:06 am COMPARISON: 11/29/2021 HISTORY: ORDERING SYSTEM PROVIDED HISTORY: ETT placement TECHNOLOGIST PROVIDED HISTORY: Reason for exam:->ETT placement Reason for Exam: ett Acuity: Acute Type of Exam: Initial FINDINGS: Initial images of the chest demonstrate an endotracheal tube with the tip 4.5 cm above the maryam. Left internal jugular central venous catheter is again seen. Bilateral airspace opacities are stable. The heart size is at the upper limits of normal.  Aortic valve replacement is again seen. There is no discernible pneumothorax. Subsequent images were obtained of the abdomen demonstrating an enteric tube with tip in the stomach. The side hole appears to be above the diaphragm. The stomach is distended. 1. The endotracheal tube tip is 4.5 cm above the maryam. 2. The enteric tube tip is in the stomach though the side hole appears to be in the esophagus. Recommend advancing the enteric tube 6 cm.      XR CHEST PORTABLE    Result Date: 11/29/2021  EXAMINATION: ONE XRAY VIEW OF THE CHEST 11/29/2021 2:19 pm COMPARISON: Chest radiograph 10/11/2021 HISTORY: ORDERING SYSTEM PROVIDED HISTORY: SOB TECHNOLOGIST PROVIDED HISTORY: Reason for exam:->SOB Reason for Exam: cp sob Acuity: Acute Type of Exam: Initial FINDINGS: There is a left basilar opacity. Hazy opacity noted at the right lung base blunting of the right costophrenic angle. No discrete pneumothorax. There is cephalization of flow. Cardiomediastinal silhouette is prominent but unchanged. Left approach HD catheter is unchanged. Aortic stent valve noted. 1. Pulmonary vascular congestion. 2. Left basilar opacity likely represents a combination of small pleural fluid and atelectasis. Likely trace right pleural fluid. Superimposed infectious parenchymal process cannot be excluded. Results for Cisco Hunt (MRN [id number]) as of 12/1/2021 20:01   Ref. Range 12/1/2021 01:20 12/1/2021 04:17 12/1/2021 09:47 12/1/2021 13:28 12/1/2021 16:30   Sodium Latest Ref Range: 136 - 145 mmol/L  136      Potassium Latest Ref Range: 3.5 - 5.1 mmol/L  3.6      Chloride Latest Ref Range: 99 - 110 mmol/L  98 (L)      CO2 Latest Ref Range: 21 - 32 mmol/L  22      BUN Latest Ref Range: 7 - 20 mg/dL  52 (H)      Creatinine Latest Ref Range: 0.9 - 1.3 mg/dL  5.6 (HH)      Anion Gap Latest Ref Range: 3 - 16   16      GFR Non- Latest Ref Range: >60   11 (A)      GFR  Latest Ref Range: >60   13 (A)      Magnesium Latest Ref Range: 1.80 - 2.40 mg/dL  2.00      Glucose Latest Ref Range: 70 - 99 mg/dL  164 (H)      POC Glucose Latest Ref Range: 70 - 99 mg/dl 140 (H)  184 (H) 154 (H) 158 (H)   Calcium Latest Ref Range: 8.3 - 10.6 mg/dL  8.6      Phosphorus Latest Ref Range: 2.5 - 4.9 mg/dL  5.1 (H)        Results for Cisco Hunt (MRN [id number]) as of 12/1/2021 20:01   Ref.  Range 11/30/2021 07:33 11/30/2021 08:08 11/30/2021 09:25 12/1/2021 04:17   WBC Latest Ref Range: 4.0 - 11.0 K/uL 13.6 (H)   10.6   RBC Latest Ref Range: 4.20 - 5.90 M/uL 3.62 (L)   2.95 (L)   Hemoglobin Quant Latest Ref Range: 13.5 - 17.5 g/dL 10.6 (L) 10.2 (L) 10.1 (L) 8.8 (L) Hematocrit Latest Ref Range: 40.5 - 52.5 % 33.2 (L)   26.3 (L)   POC Hematocrit Latest Ref Range: 40.5 - 52.5 %  30.0 (L) 30.0 (L)    MCV Latest Ref Range: 80.0 - 100.0 fL 91.6   89.1   MCH Latest Ref Range: 26.0 - 34.0 pg 29.3   29.8   MCHC Latest Ref Range: 31.0 - 36.0 g/dL 32.0   33.5   MPV Latest Ref Range: 5.0 - 10.5 fL 8.4   8.6   RDW Latest Ref Range: 12.4 - 15.4 % 16.0 (H)   16.1 (H)   Platelet Count Latest Ref Range: 135 - 450 K/uL 219   222   Neutrophils % Latest Units: % 93.5        Results for Jeff Snyder (MRN [id number]) as of 12/1/2021 20:01   Ref.  Range 11/30/2021 05:30 11/30/2021 06:12 11/30/2021 08:08 11/30/2021 09:25   O2 Therapy Unknown Unknown Unknown     Hemoglobin, Art, Extended Latest Ref Range: 13.5 - 17.5 g/dL 11.2 (L) 12.6 (L)     pH, Arterial Latest Ref Range: 7.350 - 7.450  7.082 (LL) 7.137 (LL) 7.218 (L) 7.242 (L)   pCO2, Arterial Latest Ref Range: 35.0 - 45.0 mm Hg 82.5 (HH) 66.3 (H) 53.4 (H) 52.4 (H)   pO2, Arterial Latest Ref Range: 75.0 - 108.0 mm Hg 36.7 (LL) 94.2 112.0 (H) 93.8   HCO3, Arterial Latest Ref Range: 21.0 - 29.0 mmol/L 24.0 21.9 21.8 22.6   TCO2 (calc), Art Latest Ref Range: Not Established mmol/L 26.6 23.9 23 24   Base Excess, Arterial Latest Ref Range: -3 - 3  -7.0 (L) -8.0 (L) -6 (L) -5 (L)   O2 Sat, Arterial Latest Ref Range: 93 - 100 % 60.4 (L) 95.4 97 96   Methemoglobin, Arterial Latest Ref Range: <1.5 % 0.2 0.4     Carboxyhgb, Arterial Latest Ref Range: 0.0 - 1.5 % 0.6 0.2     Sample Type Unknown   ART ART     Assessment:  Active Problems:    S/P TAVR (transcatheter aortic valve replacement)    Nonischemic cardiomyopathy (HCC)    ZAINAB on CPAP    Hyperkalemia    ESRD (end stage renal disease) on dialysis (HCC)    CHF (congestive heart failure), NYHA class I, acute on chronic, combined (HCC)    Acute respiratory failure with hypercapnia (HCC)    Acute pulmonary edema (HCC)    Obesity, Class II, BMI 35-39.9    Grade II diastolic dysfunction    Shock circulatory ([facility] [address]    Smoker    DM (diabetes mellitus), secondary uncontrolled ([facility] [address]    Normocytic normochromic anemia  Resolved Problems:    * No resolved hospital problems.  *          Plan:   · Ventilatory support to keep saturation being 90-94% only  · Ventilator settings and waveforms reviewed  · Ventilator changes made   · Pulmonary toilet  · IV sedation to maintain patient ventilator synchrony  · Titration of sedation as per RASS scores   · Will taper sedation for trial of SBT  · Patient got HD yesterday and another cycle of dialysis being planned for today  · Management of hyperkalemia as per nephrology  · Patient does not need any antibiotics from pulmonary standpoint of view  · Patient's echocardiogram in the recent past was reviewed  · Lantus insulin as per blood glucose monitoring as per IM  · Blood glucose monitoring with sliding scale insulin  · Trend hemodynamics and cardiac rhythm closely  · Enteral feeds as per metabolic support  · PUD and DVT prophylaxis     Case discussed with ICU team and nephrology     Patient's clinical status remains critical and precarious and needs to monitor closely in the ICU     Critical care time spent with patient was 35 minutes exclusive of any procedures           Electronically signed by:  Migdalia Pallas, MD    12/1/2021    8:27 AM.

## 2021-12-01 NOTE — PROGRESS NOTES
11/30/21 2017   Vent Information   Vent Mode AC/VC   Vt Ordered 550 mL   Rate Set 20 bmp   Peak Flow 44 L/min   Pressure Support 0 cmH20   FiO2  30 %   Sensitivity 3   PEEP/CPAP 5   Humidification Source HME   Vent Patient Data   Peak Inspiratory Pressure 20 cmH2O   Mean Airway Pressure 12 cmH20   Rate Measured 21 br/min   Vt Exhaled 559 mL   Minute Volume 11.7 Liters   I:E Ratio 1:1.20   Cough/Sputum   Sputum How Obtained Endotracheal   Cough Non-productive   Sputum Amount Small   Spontaneous Breathing Trial (SBT) RT Doc   Pulse 80   Breath Sounds   Right Upper Lobe Diminished   Right Middle Lobe Diminished   Right Lower Lobe Diminished   Left Upper Lobe Diminished   Left Lower Lobe Fine Crackles   Additional Respiratory  Assessments   Resp 21   Position Semi-Greene's   Cuff Pressure (cm H2O) 30 cm H2O   Alarm Settings   High Pressure Alarm 45 cmH2O   Low Minute Volume Alarm 2 L/min   High Respiratory Rate 40 br/min   Low Exhaled Vt  200 mL   ETT (adult)   Placement Date/Time: 11/30/21 0135   Tube Size: 8 mm  Secured at: 24 cm   Secured at 24 cm   Measured From Lips   ET Placement Right   Secured By Commercial tube wills   Site Condition Dry   Cuff Pressure 30 cm H2O

## 2021-12-01 NOTE — PROGRESS NOTES
12/01/21 0923   Vent Information   Vent Type 980   Vent Mode PS   Pressure Support 11 cmH20   FiO2  30 %   SpO2 96 %   SpO2/FiO2 ratio 320   Sensitivity 3   PEEP/CPAP 5   Humidification Source HME   Vent Patient Data   Peak Inspiratory Pressure 17 cmH2O   Mean Airway Pressure 12 cmH20   Rate Measured 18 br/min   Vt Exhaled 914 mL   Minute Volume 8.14 Liters   I:E Ratio 1:3.10   Spontaneous Breathing Trial (SBT) RT Doc   Pulse 89   Breath Sounds   Right Upper Lobe Diminished   Right Middle Lobe Diminished   Right Lower Lobe Diminished   Left Upper Lobe Diminished   Left Lower Lobe Diminished   Additional Respiratory  Assessments   Resp 11   Position Semi-Greene's   Alarm Settings   High Pressure Alarm 45 cmH2O   Low Minute Volume Alarm 2 L/min   Apnea (secs) 20 secs   High Respiratory Rate 40 br/min   Low Exhaled Vt  200 mL   ETT (adult)   Placement Date/Time: 11/30/21 0135   Tube Size: 8 mm  Secured at: 24 cm   Secured at 24 cm   Measured From Lips   ET Placement Right   Secured By Commercial tube wills   Site Condition Dry

## 2021-12-01 NOTE — PROGRESS NOTES
12/01/21 0411   Vent Information   Vent Type 980   Vent Mode AC/VC   Vt Ordered 550 mL   Rate Set 20 bmp   Peak Flow 44 L/min   Pressure Support 0 cmH20   FiO2  30 %   SpO2 98 %   SpO2/FiO2 ratio 326.67   Sensitivity 3   PEEP/CPAP 5   I Time/ I Time % 0.6 s   Humidification Source HME   Vent Patient Data   Peak Inspiratory Pressure 23 cmH2O   Mean Airway Pressure 13 cmH20   Rate Measured 20 br/min   Vt Exhaled 562 mL   Minute Volume 11.1 Liters   I:E Ratio 1:1.20   Spontaneous Breathing Trial (SBT) RT Doc   Pulse 84   Breath Sounds   Right Upper Lobe Diminished   Right Middle Lobe Diminished   Right Lower Lobe Diminished   Left Upper Lobe Diminished   Left Lower Lobe Diminished   Additional Respiratory  Assessments   Resp 20   Position Semi-Greene's   Cuff Pressure (cm H2O) 30 cm H2O   Alarm Settings   High Pressure Alarm 45 cmH2O   Low Minute Volume Alarm 2 L/min   High Respiratory Rate 40 br/min   Low Exhaled Vt  200 mL   Patient Observation   Observations Ambu @ bedside.  ETT moved to right side   ETT (adult)   Placement Date/Time: 11/30/21 0135   Tube Size: 8 mm  Secured at: 24 cm   Secured at 24 cm   Measured From Lips   ET Placement Right   Secured By Commercial tube wills   Site Condition Dry

## 2021-12-01 NOTE — PROGRESS NOTES
Hospitalist Progress Note    Date of Admission: 11/29/2021    Chief Complaint: Shortness of breath    Hospital Course:   46 y.o. male who presented to [facility] with shortness of breath during dialysis found to have acute hypoxic respiratory failure secondary to acute on chronic combined systolic and diastolic HF. Now Intubated and undergoing increased cycles of iHD. Subjective: intubated but able to answer questions. Overall points to his left hip which he hints at feeling uncomfortable. Otherwise denies cp, sob, n/e abdominal pain. Feels better    Labs:   Recent Labs     11/29/21  1426 11/30/21  0120 11/30/21  0733 11/30/21  0733 11/30/21  0808 11/30/21  0925 12/01/21  0417   WBC 11.2*  --  13.6*  --   --   --  10.6   HGB 10.0*   < > 10.6*   < > 10.2* 10.1* 8.8*   HCT 31.3*  --  33.2*  --   --   --  26.3*     --  219  --   --   --  222    < > = values in this interval not displayed. Recent Labs     11/29/21  1426 11/30/21  0146 11/30/21  0420 12/01/21  0417   *  --  129* 136   K 5.2* 6.7* 7.7* 3.6   CL 96*  --  94* 98*   CO2 19*  --  17* 22   BUN 88*  --  100* 52*   CREATININE 7.6*  --  7.3* 5.6*   CALCIUM 8.5  --  8.5 8.6   PHOS  --   --  7.9* 5.1*     Recent Labs     11/29/21  1426   AST 12*   ALT 16   BILITOT <0.2   ALKPHOS 128     No results for input(s): INR in the last 72 hours. Physical Exam Performed:    BP (!) 139/92   Pulse 90   Temp 97.3 °F (36.3 °C) (Bladder)   Resp 13   Ht 5' 9\" (1.753 m)   Wt 267 lb 3.2 oz (121.2 kg)   SpO2 97%   BMI 39.46 kg/m²       General appearance: Intubated, following all commands  Eyes: Conjunctivae normal, sclera non-icteric. PERRL. HENT: Oropharynx clear, mucous membranes moist.  Respiratory: On vent. Clear aeration on anterior lobes, breathing comfortably  Cardiovascular: Regular rate and rhythm, normal S1/S2, no murmurs. No JVD. No significant LE edema. Abdomen: Soft, non-tender; non-distended, normal bowel sounds.    Neuro: moves all extremities, 5/5 strength, follows commands rass +2  Skin: Skin color, texture, turgor normal.  No rashes or lesions. Assessment/Plan:    Active Hospital Problems    Diagnosis     S/P TAVR (transcatheter aortic valve replacement) [Z95.2]      Priority: Medium    Acute respiratory failure with hypercapnia (HCC) [J96.02]     Acute pulmonary edema (HCC) [J81.0]     Obesity, Class II, BMI 35-39.9 [E66.9]     Grade II diastolic dysfunction [W40.22]     Shock circulatory (HCC) [R57.9]     Smoker [F17.200]     DM (diabetes mellitus), secondary uncontrolled ([address] [E13.65]     Normocytic normochromic anemia [D64.9]     CHF (congestive heart failure), NYHA class I, acute on chronic, combined ([address] [I50.43]     ESRD (end stage renal disease) on dialysis ([address] [N18.6, Z99.2]     Hyperkalemia [E87.5]     ZAINAB on CPAP [G47.33, Z99.89]     Nonischemic cardiomyopathy ([address] [I42.8]      Acute and chronic combined systolic and diastolic CHF  -patient was started on IV Lasix but did not pass urine. He developed worsening respiratory status and required intubation.  -Continue fluid management with hemodialysis per nephrology. -Reccent echocardiogram showed ejection fraction of 02% and diastolic dysfunction grade 2    Acute hypoxic respiratory failure-secondary to above pulmonary edema/CHF. Continue vent management per pulmonology and wean as tolerated. ESRD on hemodialysis: Complicated by severe metabolic acidosis and hyperkalemia. Hyperkalemia required medical management initially. Typically he gets dialysis Monday Wednesday Friday but on day of presentation he could not complete his dialysis. Nephrology following. Resume hemodialysis as tolerated. Possible sepsis: He was started on empiric antibiotics for possible pneumonia. Procalcitonin was mildly elevated although in the face of ESRD significance is unclear. Covid testing was negative. Monitor. De-escalate antibiotic 11/30.     Diabetes- Improving with resumption of basal bolus insulin regimen. Monitor. Elevated troponin-secondary to ESRD but flat trend    Morbid Obesity -  With Body mass index is 39.46 kg/m². Complicating assessment and treatment. Placing patient at risk for multiple co-morbidities as well as early death and contributing to the patient's presentation. Aortic stenosis: Stable. ZAINAB-CPAP at home    COPD-no exacerbation    Remote history of DVT    DVT Prophylaxis: Heparin  Diet: Diet NPO  ADULT TUBE FEEDING; Orogastric; Renal Formula; Continuous; 10; Yes; 10; Q 4 hours; 40; 60; Q 4 hours; Protein; 3 bottles of proteinex daily.  30 mL free water flush before and after each administration  Code Status: Full Code  PT/OT Eval Status: NA    Dispo - ICU    Ananda Savage MD

## 2021-12-01 NOTE — PROGRESS NOTES
[facility] Progress Note           Subjective/   46y.o. year old male who we are seeing in consultation for ESRD on HD. HPI:  Last HD on 11/30 with 2 liters removed, post-weight of 121.9 kg.  BP's better, off pressors. ROS:  Remains on ventilator, no fevers. Objective/   GEN:  Chronically ill, BP (!) 143/60   Pulse 89   Temp 97.3 °F (36.3 °C) (Bladder)   Resp 11   Ht 5' 9\" (1.753 m)   Wt 267 lb 3.2 oz (121.2 kg)   SpO2 96%   BMI 39.46 kg/m²   HEENT: non-icteric, no JVD  CV: S1, S2 without m/r/g; +LE edema  RESP: CTA B without w/r/r; breathing wnl  ABD: +bs, soft, nt, no hsm  SKIN: warm, no rashes  ACCESS: Left IJ [facility]    Data/  Recent Labs     11/29/21  1426 11/30/21  0120 11/30/21  0733 11/30/21  0733 11/30/21  0808 11/30/21  0925 12/01/21  0417   WBC 11.2*  --  13.6*  --   --   --  10.6   HGB 10.0*   < > 10.6*   < > 10.2* 10.1* 8.8*   HCT 31.3*  --  33.2*  --   --   --  26.3*   MCV 90.7  --  91.6  --   --   --  89.1     --  219  --   --   --  222    < > = values in this interval not displayed.      Recent Labs     11/29/21  1426 11/30/21  0146 11/30/21  0420 12/01/21  0417   *  --  129* 136   K 5.2* 6.7* 7.7* 3.6   CL 96*  --  94* 98*   CO2 19*  --  17* 22   GLUCOSE 354*  --  424* 164*   PHOS  --   --  7.9* 5.1*   MG  --   --  2.40 2.00   BUN 88*  --  100* 52*   CREATININE 7.6*  --  7.3* 5.6*   LABGLOM 8*  --  8* 11*   GFRAA 9*  --  10* 13*       Assessment/     - End stage renal disease - on HD Mon-Wed-Fri     - Acute hypoxic, hypercarbic respiratory failure/Pulmonary edema     - Anion-gap metabolic acidosis in setting of hyperglycemia     - Hyperkalemia - in setting of translocation with hyperglycemia     - Hyponatremia - in setting of translocation with hyperglycemia     - Hypertension - labile, high on admission, hypotensive requiring pressors, now off     - Anemia of chronic disease - Hb 10.1->8.8, Epogen 1,600 units qHD    Plan/     - Repeat HD today with UF as tolerated with crit line monitoring, prn albumin  - Redose DAVON with HD today  - Trend labs, bp's     ____________________________________  Angelique Sutton MD  [facility]  [email or web address]  Office: [phone]

## 2021-12-02 ENCOUNTER — TELEPHONE (OUTPATIENT)
Dept: FAMILY MEDICINE CLINIC | Age: 51
End: 2021-12-02

## 2021-12-02 LAB
ALBUMIN SERPL-MCNC: 3.7 G/DL (ref 3.4–5)
ANION GAP SERPL CALCULATED.3IONS-SCNC: 15 MMOL/L (ref 3–16)
BUN BLDV-MCNC: 36 MG/DL (ref 7–20)
CALCIUM SERPL-MCNC: 9.1 MG/DL (ref 8.3–10.6)
CHLORIDE BLD-SCNC: 95 MMOL/L (ref 99–110)
CO2: 23 MMOL/L (ref 21–32)
CREAT SERPL-MCNC: 4.6 MG/DL (ref 0.9–1.3)
GFR AFRICAN AMERICAN: 16
GFR NON-AFRICAN AMERICAN: 13
GLUCOSE BLD-MCNC: 155 MG/DL (ref 70–99)
GLUCOSE BLD-MCNC: 181 MG/DL (ref 70–99)
GLUCOSE BLD-MCNC: 223 MG/DL (ref 70–99)
GLUCOSE BLD-MCNC: 92 MG/DL (ref 70–99)
HCT VFR BLD CALC: 30.8 % (ref 40.5–52.5)
HEMOGLOBIN: 10.1 G/DL (ref 13.5–17.5)
MAGNESIUM: 1.9 MG/DL (ref 1.8–2.4)
MCH RBC QN AUTO: 28.6 PG (ref 26–34)
MCHC RBC AUTO-ENTMCNC: 33 G/DL (ref 31–36)
MCV RBC AUTO: 86.7 FL (ref 80–100)
PDW BLD-RTO: 15.7 % (ref 12.4–15.4)
PERFORMED ON: ABNORMAL
PHOSPHORUS: 4.3 MG/DL (ref 2.5–4.9)
PLATELET # BLD: 260 K/UL (ref 135–450)
PMV BLD AUTO: 8.4 FL (ref 5–10.5)
POTASSIUM SERPL-SCNC: 3.4 MMOL/L (ref 3.5–5.1)
PRO-BNP: ABNORMAL PG/ML (ref 0–124)
RBC # BLD: 3.55 M/UL (ref 4.2–5.9)
SODIUM BLD-SCNC: 133 MMOL/L (ref 136–145)
WBC # BLD: 11.2 K/UL (ref 4–11)

## 2021-12-02 PROCEDURE — 6370000000 HC RX 637 (ALT 250 FOR IP): Performed by: NURSE PRACTITIONER

## 2021-12-02 PROCEDURE — 83735 ASSAY OF MAGNESIUM: CPT

## 2021-12-02 PROCEDURE — 94640 AIRWAY INHALATION TREATMENT: CPT

## 2021-12-02 PROCEDURE — 6360000002 HC RX W HCPCS: Performed by: INTERNAL MEDICINE

## 2021-12-02 PROCEDURE — 85027 COMPLETE CBC AUTOMATED: CPT

## 2021-12-02 PROCEDURE — 90935 HEMODIALYSIS ONE EVALUATION: CPT

## 2021-12-02 PROCEDURE — 6370000000 HC RX 637 (ALT 250 FOR IP): Performed by: INTERNAL MEDICINE

## 2021-12-02 PROCEDURE — 83880 ASSAY OF NATRIURETIC PEPTIDE: CPT

## 2021-12-02 PROCEDURE — 99233 SBSQ HOSP IP/OBS HIGH 50: CPT | Performed by: INTERNAL MEDICINE

## 2021-12-02 PROCEDURE — 80069 RENAL FUNCTION PANEL: CPT

## 2021-12-02 PROCEDURE — 36415 COLL VENOUS BLD VENIPUNCTURE: CPT

## 2021-12-02 PROCEDURE — 2060000000 HC ICU INTERMEDIATE R&B

## 2021-12-02 PROCEDURE — 6370000000 HC RX 637 (ALT 250 FOR IP): Performed by: STUDENT IN AN ORGANIZED HEALTH CARE EDUCATION/TRAINING PROGRAM

## 2021-12-02 PROCEDURE — 6360000002 HC RX W HCPCS

## 2021-12-02 RX ORDER — LOSARTAN POTASSIUM 50 MG/1
TABLET ORAL
Qty: 90 TABLET | Refills: 0 | Status: SHIPPED | OUTPATIENT
Start: 2021-12-02 | End: 2021-12-03 | Stop reason: HOSPADM

## 2021-12-02 RX ORDER — HEPARIN SODIUM 1000 [USP'U]/ML
INJECTION, SOLUTION INTRAVENOUS; SUBCUTANEOUS
Status: COMPLETED
Start: 2021-12-02 | End: 2021-12-02

## 2021-12-02 RX ORDER — LOSARTAN POTASSIUM 50 MG/1
TABLET ORAL
Qty: 30 TABLET | Refills: 1 | Status: SHIPPED | OUTPATIENT
Start: 2021-12-02 | End: 2021-12-02

## 2021-12-02 RX ADMIN — INSULIN GLARGINE 20 UNITS: 100 INJECTION, SOLUTION SUBCUTANEOUS at 20:09

## 2021-12-02 RX ADMIN — MUPIROCIN: 20 OINTMENT TOPICAL at 20:09

## 2021-12-02 RX ADMIN — FAMOTIDINE 20 MG: 20 TABLET ORAL at 16:23

## 2021-12-02 RX ADMIN — DULOXETINE HYDROCHLORIDE 30 MG: 30 CAPSULE, DELAYED RELEASE ORAL at 07:59

## 2021-12-02 RX ADMIN — GABAPENTIN 100 MG: 100 CAPSULE ORAL at 20:03

## 2021-12-02 RX ADMIN — INSULIN LISPRO 3 UNITS: 100 INJECTION, SOLUTION INTRAVENOUS; SUBCUTANEOUS at 12:13

## 2021-12-02 RX ADMIN — INSULIN LISPRO 3 UNITS: 100 INJECTION, SOLUTION INTRAVENOUS; SUBCUTANEOUS at 15:59

## 2021-12-02 RX ADMIN — CARVEDILOL 3.12 MG: 3.12 TABLET, FILM COATED ORAL at 12:10

## 2021-12-02 RX ADMIN — IPRATROPIUM BROMIDE AND ALBUTEROL SULFATE 1 AMPULE: .5; 3 SOLUTION RESPIRATORY (INHALATION) at 16:39

## 2021-12-02 RX ADMIN — OXYCODONE AND ACETAMINOPHEN 1 TABLET: 7.5; 325 TABLET ORAL at 20:03

## 2021-12-02 RX ADMIN — CARVEDILOL 3.12 MG: 3.12 TABLET, FILM COATED ORAL at 16:23

## 2021-12-02 RX ADMIN — GABAPENTIN 100 MG: 100 CAPSULE ORAL at 15:59

## 2021-12-02 RX ADMIN — DOCUSATE SODIUM 100 MG: 50 LIQUID ORAL at 20:03

## 2021-12-02 RX ADMIN — CLOPIDOGREL BISULFATE 75 MG: 75 TABLET ORAL at 12:09

## 2021-12-02 RX ADMIN — HEPARIN SODIUM 5000 UNITS: 5000 INJECTION INTRAVENOUS; SUBCUTANEOUS at 15:59

## 2021-12-02 RX ADMIN — IPRATROPIUM BROMIDE AND ALBUTEROL SULFATE 1 AMPULE: .5; 3 SOLUTION RESPIRATORY (INHALATION) at 20:32

## 2021-12-02 RX ADMIN — HEPARIN SODIUM 5000 UNITS: 5000 INJECTION INTRAVENOUS; SUBCUTANEOUS at 20:03

## 2021-12-02 RX ADMIN — PRAVASTATIN SODIUM 40 MG: 40 TABLET ORAL at 20:03

## 2021-12-02 RX ADMIN — INSULIN LISPRO 6 UNITS: 100 INJECTION, SOLUTION INTRAVENOUS; SUBCUTANEOUS at 20:09

## 2021-12-02 RX ADMIN — IPRATROPIUM BROMIDE AND ALBUTEROL SULFATE 1 AMPULE: .5; 3 SOLUTION RESPIRATORY (INHALATION) at 12:23

## 2021-12-02 RX ADMIN — HEPARIN SODIUM 4100 UNITS: 1000 INJECTION INTRAVENOUS; SUBCUTANEOUS at 08:01

## 2021-12-02 RX ADMIN — HEPARIN SODIUM 5000 UNITS: 5000 INJECTION INTRAVENOUS; SUBCUTANEOUS at 06:08

## 2021-12-02 RX ADMIN — QUETIAPINE FUMARATE 50 MG: 25 TABLET ORAL at 20:03

## 2021-12-02 ASSESSMENT — PAIN SCALES - GENERAL
PAINLEVEL_OUTOF10: 0
PAINLEVEL_OUTOF10: 0
PAINLEVEL_OUTOF10: 7
PAINLEVEL_OUTOF10: 0
PAINLEVEL_OUTOF10: 0

## 2021-12-02 ASSESSMENT — PULMONARY FUNCTION TESTS: PIF_VALUE: 38

## 2021-12-02 NOTE — PROGRESS NOTES
Comprehensive Nutrition Assessment    Type and Reason for Visit:  Reassess    Nutrition Recommendations/Plan:   1. Modify diet to carb control, 2 gm sodium diet - monitor need for renal restrictions   2. Encourage PO intakes as tolerated   3. Monitor appropriateness for diet education - RD to provide per availability   4. Monitor nutrition adequacy, pertinent labs, bowel habits, wt changes, and clinical progress    Nutrition Assessment:  Follow up: Pt extubated on 12/1. Diet advanced to carb control, RD added low sodium for CHF and ESRD on HD. Pt remains nutritionally compromised AEB decreased appetite. No PO intakes this date, declined RD assisting with meal ordering for lunch this afternoon. Encouraged PO intakes as tolerated. Declined ONS to promote PO. Will follow up for carb control, CHF nutrition education as feasible, not appropriate this date. Will monitor. Malnutrition Assessment:  Malnutrition Status: At risk for malnutrition (Comment)      Estimated Daily Nutrient Needs:  Energy (kcal):  1121-5095 kcal; Weight Used for Energy Requirements:  Ideal (72 kg)     Protein (g):  86-94 g; Weight Used for Protein Requirements:  Ideal (1.2-1.3 g/kg)        Fluid (ml/day):   ; Method Used for Fluid Requirements:  1 ml/kcal      Nutrition Related Findings:  BG  past 24 hrs. Na 133 mmol/L. K 3.4 mmol/L. Wounds:  None       Current Nutrition Therapies:    ADULT DIET; Regular; 5 carb choices (75 gm/meal);  Low Sodium (2 gm)    Anthropometric Measures:  · Height: 5' 9\" (175.3 cm)  · Current Body Weight: 266 lb (120.7 kg)   · Ideal Body Weight: 160 lbs; % Ideal Body Weight 171.9 %   · BMI: 39.3  · BMI Categories: Obese Class 3 (BMI 40.0 or greater)       Nutrition Diagnosis:   · Inadequate oral intake related to inadequate protein-energy intake as evidenced by intake 0-25%, intake 51-75%      Nutrition Interventions:   Food and/or Nutrient Delivery:  Modify Current Diet  Nutrition Education/Counseling: Education not appropriate   Coordination of Nutrition Care:  Continue to monitor while inpatient    Goals:  Pt will consume 50% or greater of meals this admission       Nutrition Monitoring and Evaluation:   Behavioral-Environmental Outcomes:  None Identified   Food/Nutrient Intake Outcomes:  Diet Advancement/Tolerance, Food and Nutrient Intake  Physical Signs/Symptoms Outcomes:  Fluid Status or Edema, Weight, Biochemical Data     Discharge Planning:    Continue current diet     Electronically signed by Nhung Siddiqi, MS, RD, LD on 12/2/21 at 1:35 PM EST    Contact: [phone]

## 2021-12-02 NOTE — PROGRESS NOTES
Today, 12/02/21, about [id number] pt had an unwitnessed fall. Pt assessment clear. VSS, A/Ox4, PERRL, brief neuro exam WNL. Pt denies injury - only embarrassed. On skin assessment no redness or bruising observed. Pt states he did not want to bother anyone and was going to take himself back to bed, from the chair, w/o using the call light which was within reach. Pt had been instructed by RN to use the call light to inform RN that he wanted to go back to bed so I may assist him if needed. Pt stated after he fell he spilled his water all over the bedside table and himself as well as the floor and landed on his buttocks. Floor was cleaned up before pt was assisted back to bed and the bed alarm was engaged. Pt had not had a chair alarm because pt was a low to medium fall risk. Fall Risk assessment completed and documented after fall. RN impressed on pt the necessity of using the call light for assistance to the bathroom or to/from the bed, to avoid falls and potential injuries.     Dr. Hernando Hector MD notified   Wife, Isabel Pablo called by [phone]

## 2021-12-02 NOTE — PROGRESS NOTES
[facility] Progress Note           Subjective/   46y.o. year old male who we are seeing in consultation for ESRD on HD. HPI:  Last HD on 12/1 with 2.8 liters removed, post-weight of 119 kg. Seen on dialysis. Orders confirmed. Pre-weight at HD today 121.1 kg.  BP's better, off pressors. ROS:  Now extubated, no fevers. Objective/   GEN:  Chronically ill, BP (!) 87/58   Pulse 54   Temp 97.9 °F (36.6 °C)   Resp 18   Ht 5' 9\" (1.753 m)   Wt 266 lb 15.6 oz (121.1 kg)   SpO2 93%   BMI 39.43 kg/m²   HEENT: non-icteric, no JVD  CV: S1, S2 without m/r/g; + LE edema  RESP: CTA B without w/r/r; breathing wnl  ABD: +bs, soft, nt, no hsm  SKIN: warm, no rashes  ACCESS: Left IJ [facility]    Data/  Recent Labs     11/30/21  0733 11/30/21  0808 11/30/21  0925 12/01/21 0417 12/02/21 0417   WBC 13.6*  --   --  10.6 11.2*   HGB 10.6*   < > 10.1* 8.8* 10.1*   HCT 33.2*  --   --  26.3* 30.8*   MCV 91.6  --   --  89.1 86.7     --   --  222 260    < > = values in this interval not displayed.      Recent Labs     11/30/21  0420 12/01/21 0417 12/02/21 0417   * 136 133*   K 7.7* 3.6 3.4*   CL 94* 98* 95*   CO2 17* 22 23   GLUCOSE 424* 164* 92   PHOS 7.9* 5.1* 4.3   MG 2.40 2.00 1.90   * 52* 36*   CREATININE 7.3* 5.6* 4.6*   LABGLOM 8* 11* 13*   GFRAA 10* 13* 16*       Assessment/     - End stage renal disease - on HD Mon-Wed-Fri     - Acute hypoxic, hypercarbic respiratory failure/Pulmonary edema     - Anion-gap metabolic acidosis in setting of hyperglycemia     - Hyperkalemia - in setting of translocation with hyperglycemia     - Hyponatremia - in setting of translocation with hyperglycemia     - Hypertension - labile, high on admission, hypotensive requiring pressors, now off     - Anemia of chronic disease - Hb at target, Epogen 1,600 units qHD    Plan/     - Extra HD today with UF as tolerated with crit line monitoring, prn albumin -  kg  - Regular HD tomorrow per schedule  - Hold DAVON dosing with higher BP trend  - Trend labs, bp's     ____________________________________  Uvaldo Martinez MD  [facility]  [email or web address]  Office: [phone]

## 2021-12-02 NOTE — CARE COORDINATION
Chart reviewed, pt in HD now. Pt extubated yesterday. Pt from home, OP HD at MWF at [facility]. CM will continue to follow pt's progress and coordinate discharge arrangements.   REINALDO Crum-NAVJOT

## 2021-12-02 NOTE — PROGRESS NOTES
INPATIENT PULMONARY CRITICAL CARE PROGRESS NOTE      Reason for visit    acute respiratory failure    SUBJECTIVE: Patient's sedation was tapered  to DC and patient was given a trial of CPAP with pressure support which the patient tolerated well and patient was generating good tidal volumes with maintain acid-base balance and patient was alert on the monitor and patient was extubated, patient when seen was tolerating extubation well and did not have any increased shortness of breath, patient has some coughing, no chest pain per se, patient to have another cycle of dialysis today patient was afebrile and patient's blood pressure was slightly on the higher side, patient's blood sugars were slightly on the higher side and not optimally controlled, patient had sinus rhythm on the monitor, , patient's cumulative fluid balance was - 1.3 L, no other pertinent review of system of concern            Physical Exam:  Blood pressure (!) 170/91, pulse 96, temperature 97.9 °F (36.6 °C), resp. rate 17, height 5' 9\" (1.753 m), weight 266 lb 15.6 oz (121.1 kg), SpO2 94 %.'     Constitutional:  No acute distress. HENT: Endotracheal tube present. No thyromegaly. Eyes:  Conjunctivae are normal. Pupils equal, round, and reactive to light. No scleral icterus. Neck: . No tracheal deviation present. No obvious thyroid mass. Short enlarged neck  Cardiovascular: Normal rate, regular rhythm, normal heart sounds. No right ventricular heave some lower extremity edema. Pulmonary/Chest: No wheezes. Decreased Bilateral rales. Chest wall is not dull to percussion. No accessory muscle usage or stridor. Abdominal: Soft. Bowel sounds present. No distension or hernia. No tenderness. Musculoskeletal: No cyanosis. No clubbing. No obvious joint deformity. Lymphadenopathy: No cervical or supraclavicular adenopathy. Skin: Skin is warm and dry. No rash or nodules on the exposed extremities. ,  Tattoos present  Neurologic:  Alert and communicative with no focal cranial nerve deficits           Results:  CBC:   Recent Labs     11/30/21  0733 11/30/21  0808 11/30/21  0925 12/01/21 0417 12/02/21 0417   WBC 13.6*  --   --  10.6 11.2*   HGB 10.6*   < > 10.1* 8.8* 10.1*   HCT 33.2*  --   --  26.3* 30.8*   MCV 91.6  --   --  89.1 86.7     --   --  222 260    < > = values in this interval not displayed. BMP:   Recent Labs     11/30/21  0420 12/01/21 0417 12/02/21 0417   * 136 133*   K 7.7* 3.6 3.4*   CL 94* 98* 95*   CO2 17* 22 23   PHOS 7.9* 5.1* 4.3   * 52* 36*   CREATININE 7.3* 5.6* 4.6*     Imaging:  I have reviewed radiology images personally. XR CHEST PORTABLE   Final Result   1. Appropriate line and tube positioning without visualization of the tip of   the enteric tube. 2. Bibasilar hazy opacities likely represent atelectasis and layering pleural   fluid. Superimposed parenchymal process such as pneumonia cannot be excluded. XR ABDOMEN (KUB) (SINGLE AP VIEW)   Final Result      XR ABDOMEN (KUB) (SINGLE AP VIEW)   Final Result   1. The endotracheal tube tip is 4.5 cm above the maryam. 2. The enteric tube tip is in the stomach though the side hole appears to be   in the esophagus. Recommend advancing the enteric tube 6 cm. XR CHEST PORTABLE   Final Result   1. The endotracheal tube tip is 4.5 cm above the maryam. 2. The enteric tube tip is in the stomach though the side hole appears to be   in the esophagus. Recommend advancing the enteric tube 6 cm. XR CHEST PORTABLE   Final Result   1. Pulmonary vascular congestion. 2. Left basilar opacity likely represents a combination of small pleural   fluid and atelectasis. Likely trace right pleural fluid. Superimposed   infectious parenchymal process cannot be excluded. Results for Maryam Gallego (MRN [id number]) as of 12/2/2021 22:09   Ref.  Range 12/1/2021 23:27 12/2/2021 04:17 12/2/2021 11:54 12/2/2021 16:03 12/2/2021 20:07   Sodium Latest Ref Range: 136 - 145 mmol/L  133 (L)      Potassium Latest Ref Range: 3.5 - 5.1 mmol/L  3.4 (L)      Chloride Latest Ref Range: 99 - 110 mmol/L  95 (L)      CO2 Latest Ref Range: 21 - 32 mmol/L  23      BUN Latest Ref Range: 7 - 20 mg/dL  36 (H)      Creatinine Latest Ref Range: 0.9 - 1.3 mg/dL  4.6 (H)      Anion Gap Latest Ref Range: 3 - 16   15      GFR Non- Latest Ref Range: >60   13 (A)      GFR  Latest Ref Range: >60   16 (A)      Magnesium Latest Ref Range: 1.80 - 2.40 mg/dL  1.90      Glucose Latest Ref Range: 70 - 99 mg/dL  92      POC Glucose Latest Ref Range: 70 - 99 mg/dl 140 (H)  155 (H) 181 (H) 223 (H)   Calcium Latest Ref Range: 8.3 - 10.6 mg/dL  9.1      Phosphorus Latest Ref Range: 2.5 - 4.9 mg/dL  4.3        Results for Tiffany Petersen (MRN [id number]) as of 12/2/2021 22:09   Ref. Range 11/30/2021 07:33 11/30/2021 08:08 11/30/2021 09:25 12/1/2021 04:17 12/2/2021 04:17   WBC Latest Ref Range: 4.0 - 11.0 K/uL 13.6 (H)   10.6 11.2 (H)   RBC Latest Ref Range: 4.20 - 5.90 M/uL 3.62 (L)   2.95 (L) 3.55 (L)   Hemoglobin Quant Latest Ref Range: 13.5 - 17.5 g/dL 10.6 (L) 10.2 (L) 10.1 (L) 8.8 (L) 10.1 (L)   Hematocrit Latest Ref Range: 40.5 - 52.5 % 33.2 (L)   26.3 (L) 30.8 (L)   POC Hematocrit Latest Ref Range: 40.5 - 52.5 %  30.0 (L) 30.0 (L)     MCV Latest Ref Range: 80.0 - 100.0 fL 91.6   89.1 86.7   MCH Latest Ref Range: 26.0 - 34.0 pg 29.3   29.8 28.6   MCHC Latest Ref Range: 31.0 - 36.0 g/dL 32.0   33.5 33.0   MPV Latest Ref Range: 5.0 - 10.5 fL 8.4   8.6 8.4   RDW Latest Ref Range: 12.4 - 15.4 % 16.0 (H)   16.1 (H) 15.7 (H)   Platelet Count Latest Ref Range: 135 - 450 K/uL 219   222 260   Neutrophils % Latest Units: % 93.5       Lymphocyte % Latest Units: % 2.7         Results for Tiffany Nicola (MRN [id number]) as of 12/2/2021 22:09   Ref.  Range 11/30/2021 05:30 11/30/2021 06:12 11/30/2021 08:08 11/30/2021 09:25 12/1/2021 09:51   O2 Therapy Unknown Unknown Unknown      Hemoglobin, Art, Extended Latest Ref Range: 13.5 - 17.5 g/dL 11.2 (L) 12.6 (L)      pH, Arterial Latest Ref Range: 7.350 - 7.450  7.082 (LL) 7.137 (LL) 7.218 (L) 7.242 (L) 7.393   pCO2, Arterial Latest Ref Range: 35.0 - 45.0 mm Hg 82.5 (HH) 66.3 (H) 53.4 (H) 52.4 (H) 40.2   pO2, Arterial Latest Ref Range: 75.0 - 108.0 mm Hg 36.7 (LL) 94.2 112.0 (H) 93.8 79.7   HCO3, Arterial Latest Ref Range: 21.0 - 29.0 mmol/L 24.0 21.9 21.8 22.6 24.5   TCO2 (calc), Art Latest Ref Range: Not Established mmol/L 26.6 23.9 23 24 26   Base Excess, Arterial Latest Ref Range: -3 - 3  -7.0 (L) -8.0 (L) -6 (L) -5 (L) -1   O2 Sat, Arterial Latest Ref Range: 93 - 100 % 60.4 (L) 95.4 97 96 96   Methemoglobin, Arterial Latest Ref Range: <1.5 % 0.2 0.4      Carboxyhgb, Arterial Latest Ref Range: 0.0 - 1.5 % 0.6 0.2      Sample Type Unknown   ART ART ART     Assessment:  Active Problems:    S/P TAVR (transcatheter aortic valve replacement)    Nonischemic cardiomyopathy (HCC)    ZAINAB on CPAP    Hyperkalemia    ESRD (end stage renal disease) on dialysis (HCC)    CHF (congestive heart failure), NYHA class I, acute on chronic, combined (HCC)    Acute respiratory failure with hypercapnia (HCC)    Acute pulmonary edema (HCC)    Obesity, Class II, BMI 35-39.9    Grade II diastolic dysfunction    Shock circulatory ([facility])    Smoker    DM (diabetes mellitus), secondary uncontrolled ([facility])    Normocytic normochromic anemia  Resolved Problems:    * No resolved hospital problems.  *          Plan:   · Oxygen supplementation to keep saturation being 90-94% only  · Please titrate the oxygen as per the above parameters  · May need BiPAP on intermittent basis  · Pulmonary toilet  · Patient plan to get received 1 cycle of dialysis today  · Management of hyperkalemia as per nephrology  · Patient does not need any antibiotics from pulmonary standpoint of view  · Patient's echocardiogram in the recent past was reviewed  · Antihypertensives as per IM  · Bronchodilators  · Lantus insulin as per blood glucose monitoring as per IM  · Blood glucose monitoring with sliding scale insulin  · Trend hemodynamics and cardiac rhythm closely  · Enteral feeds as per metabolic support  · PUD and DVT prophylaxis     Case discussed with ICU team           Electronically signed by:  Slick Zapata MD    12/2/2021    3:12 PM.

## 2021-12-02 NOTE — PLAN OF CARE
Problem: Nutrition  Goal: Optimal nutrition therapy  Note: Nutrition Problem #1: Inadequate oral intake  Intervention: Food and/or Nutrient Delivery: Modify Current Diet  Nutritional Goals: Pt will consume 50% or greater of meals this admission

## 2021-12-02 NOTE — PLAN OF CARE
Problem: Non-Violent Restraints  Goal: Removal from restraints as soon as assessed to be safe  Outcome: Completed  Goal: No harm/injury to patient while restraints in use  Outcome: Completed  Goal: Patient's dignity will be maintained  Outcome: Completed     Problem: Nutrition  Goal: Optimal nutrition therapy  Outcome: Ongoing     Problem: OXYGENATION/RESPIRATORY FUNCTION  Goal: Patient will maintain patent airway  Outcome: Ongoing  Goal: Patient will achieve/maintain normal respiratory rate/effort  Description: Respiratory rate and effort will be within normal limits for the patient  Outcome: Ongoing     Problem: HEMODYNAMIC STATUS  Goal: Patient has stable vital signs and fluid balance  Outcome: Ongoing     Problem: FLUID AND ELECTROLYTE IMBALANCE  Goal: Fluid and electrolyte balance are achieved/maintained  Outcome: Ongoing     Problem: ACTIVITY INTOLERANCE/IMPAIRED MOBILITY  Goal: Mobility/activity is maintained at optimum level for patient  Outcome: Ongoing     Problem: Fluid Volume:  Goal: Will show no signs or symptoms of fluid imbalance  Description: Will show no signs or symptoms of fluid imbalance  Outcome: Ongoing

## 2021-12-02 NOTE — PROGRESS NOTES
12/02/21 0052   NIV Type   Skin Assessment Clean, dry, & intact   Skin Protection for O2 Device Yes   Location Nose   NIV Started/Stopped On   Equipment Type V60   Mode Bilevel   Mask Type Full face mask   Mask Size Medium   Bonnet size Medium   Settings/Measurements   IPAP 15 cmH20   CPAP/EPAP 6 cmH2O   Rate Ordered 10   Resp 15   FiO2  30 %   Vt Exhaled 742 mL   Minute Volume 10.1 Liters   Mask Leak (lpm) 10 lpm   Comfort Level Good   Using Accessory Muscles No   SpO2 99   Breath Sounds   Right Upper Lobe Diminished   Right Middle Lobe Diminished   Right Lower Lobe Diminished   Left Upper Lobe Diminished   Left Lower Lobe Diminished   Alarm Settings   Alarms On Y   Oxygen Therapy/Pulse Ox   SpO2 99 %

## 2021-12-02 NOTE — PROGRESS NOTES
Alert but is forgetful on why he has to stay overnight. Poor to fair appetite. Coreg given but is now hypertensive again. HD RN spoke to nephrology about HTN today. Three liters off in HD today. Pt requesting home dose of Percocet. Dr. Tami Veloz paged before 5 pm but no new orders.

## 2021-12-02 NOTE — FLOWSHEET NOTE
12/02/21 0735 12/02/21 1036   Treatment   Time On 0739  --    Time Off  --  1036   Vital Signs   BP (!) 87/58 (!) 164/99   Temp 97.9 °F (36.6 °C)  --    Pulse 54 94   Resp 18 18   Weight 266 lb 15.6 oz (121.1 kg) 261 lb 0.4 oz (118.4 kg)   Weight Method Bed scale Bed scale   Percent Weight Change 2.71 -2.23   Dry Weight  --  255 lb 11.7 oz (116 kg)   Treatment Initiation   Dialyze Hours 3  --    Dialysis Bath   K+ (Potassium) 4  --    Ca+ (Calcium) 2.25  --    Na+ (Sodium) 138  --    HCO3 (Bicarb) 32  --    Post-Hemodialysis Assessment   Rinseback Volume (ml)  --  400 ml   Duration of Treatment (minutes)  --  180 minutes   Heparin amount administered during treatment (units)  --  3200 units   Hemodialysis Output (ml)  --  3100 ml   NET Removed (ml)  --  2700 ml   Tolerated Treatment  --  Good     Treatment time: 3 Hours  Net UF: 2700 ML    Pre weight: 121.1 Kg  Post weight: 118.4 kg  EDW: 116 kg    Access used: LCW TDC   Access function: Good with  ml/min    Medications or blood products given: None    Regular outpatient schedule: MWF at [facility] of response to treatment: Tolerated tx well, VSS throughout. Crit line summary: HCt #1 33.6 minus HCT#233.3 is . 3 showing no refill present. Entire tx in profile B    Copy of dialysis treatment record placed in chart, to be scanned into EMR. Report to Sylvia Song RN.

## 2021-12-02 NOTE — PROGRESS NOTES
Hospitalist Progress Note    Date of Admission: 11/29/2021    Chief Complaint: Shortness of breath    Hospital Course:   46 y.o. male who presented to [facility] with shortness of breath during dialysis found to have acute hypoxic respiratory failure secondary to acute on chronic combined systolic and diastolic HF. Intubated and now extubated 12/01. On RA    Subjective: feels very well. No concerns. No gi, pulmonary or cardiac concerns. eager to go home when cleared by nephrology. Labs:   Recent Labs     11/30/21  0733 11/30/21  0808 11/30/21  0925 12/01/21 0417 12/02/21 0417   WBC 13.6*  --   --  10.6 11.2*   HGB 10.6*   < > 10.1* 8.8* 10.1*   HCT 33.2*  --   --  26.3* 30.8*     --   --  222 260    < > = values in this interval not displayed. Recent Labs     11/30/21  0420 12/01/21 0417 12/02/21 0417   * 136 133*   K 7.7* 3.6 3.4*   CL 94* 98* 95*   CO2 17* 22 23   * 52* 36*   CREATININE 7.3* 5.6* 4.6*   CALCIUM 8.5 8.6 9.1   PHOS 7.9* 5.1* 4.3     No results for input(s): AST, ALT, BILIDIR, BILITOT, ALKPHOS in the last 72 hours. No results for input(s): INR in the last 72 hours. Physical Exam Performed:    BP (!) 174/84   Pulse 99   Temp 97.7 °F (36.5 °C) (Oral)   Resp 17   Ht 5' 9\" (1.753 m)   Wt 266 lb 15.6 oz (121.1 kg)   SpO2 95%   BMI 39.43 kg/m²       General appearance: siting in chair, talking comfortably, watching tv  Respiratory: On RA, good aeration bilaterally with slight crackle bibasilar  Cardiovascular: Regular rate and rhythm, normal S1/S2  Abdomen: Soft, non-tender; non-distended, normal bowel sounds. Neuro: moves all extremities, 5/5 strength, follows commands rass +2  Skin: Skin color, texture, turgor normal.  No rashes or lesions.       Assessment/Plan:    Active Hospital Problems    Diagnosis     S/P TAVR (transcatheter aortic valve replacement) [Z95.2]      Priority: Medium    Acute respiratory failure with hypercapnia ([address] [J96.02]  Acute pulmonary edema (HCC) [J81.0]     Obesity, Class II, BMI 35-39.9 [E66.9]     Grade II diastolic dysfunction [G43.88]     Shock circulatory (HCC) [R57.9]     Smoker [F17.200]     DM (diabetes mellitus), secondary uncontrolled ([facility] [E13.65]     Normocytic normochromic anemia [D64.9]     CHF (congestive heart failure), NYHA class I, acute on chronic, combined ([facility] [address] [I50.43]     ESRD (end stage renal disease) on dialysis ([facility] [facility] [N18.6, Z99.2]     Hyperkalemia [E87.5]     ZAINAB on CPAP [G47.33, Z99.89]     Nonischemic cardiomyopathy (HCC) [I42.8]      Acute and chronic combined systolic and diastolic CHF  -patient was started on IV Lasix but did not pass urine. He developed worsening respiratory status and required intubation.  -Continue fluid management with hemodialysis per nephrology. -Reccent echocardiogram showed ejection fraction of 32% and diastolic dysfunction grade 2    Acute hypoxic respiratory failure-secondary to above pulmonary edema/CHF. Resolved. On RA    ESRD on hemodialysis: Complicated by severe metabolic acidosis and hyperkalemia. Hyperkalemia required medical management initially. Typically he gets dialysis Monday Wednesday Friday but on day of presentation he could not complete his dialysis. Nephrology following. Resume hemodialysis as tolerated. Possible sepsis: He was started on empiric antibiotics for possible pneumonia. Procalcitonin was mildly elevated although in the face of ESRD significance is unclear. Covid testing was negative. Monitor. De-escalate antibiotic 11/30. Diabetes- Improving with resumption of basal bolus insulin regimen. Monitor. Elevated troponin-secondary to ESRD but flat trend    Morbid Obesity -  With Body mass index is 39.43 kg/m². Complicating assessment and treatment. Placing patient at risk for multiple co-morbidities as well as early death and contributing to the patient's presentation. Aortic stenosis: Stable.      ZAINAB-CPAP at home    COPD-no exacerbation    Remote history of DVT    DVT Prophylaxis: Heparin  Diet: ADULT DIET; Regular; 5 carb choices (75 gm/meal);  Low Sodium (2 gm)  Code Status: Full Code  PT/OT Eval Status: NA    Dispo - floor status, anticipate dc 12/03 after iHD if ok with nephrology    Aida Jordan MD

## 2021-12-02 NOTE — TELEPHONE ENCOUNTER
Requesting Refill  Losartan 25 mg    Last Office Visit  -  10/2/621  Next Office Visit  - none    Sent RX :  [id number] [address] [phone] - F [phone]

## 2021-12-02 NOTE — PROGRESS NOTES
Pt has removed monitoring cables several times along with gown. Pt is alert and oriented and have educated pt the importance of leave the monitoring devices in place. Pt has also made several inappropriate, vulgar comments to me while I have been in the room to provide care.

## 2021-12-02 NOTE — PLAN OF CARE
Problem: Nutrition  Goal: Optimal nutrition therapy  12/2/2021 1753 by Dipak Beckman RN  Outcome: Ongoing  12/2/2021 1336 by Brea Arora, MS, RD, LD  Note: Nutrition Problem #1: Inadequate oral intake  Intervention: Food and/or Nutrient Delivery: Modify Current Diet  Nutritional Goals: Pt will consume 50% or greater of meals this admission    12/2/2021 0625 by Alycia Sellers RN  Outcome: Ongoing     Problem: OXYGENATION/RESPIRATORY FUNCTION  Goal: Patient will maintain patent airway  12/2/2021 1753 by Dipak Beckman RN  Outcome: Ongoing  12/2/2021 0625 by Alycia Sellers RN  Outcome: Ongoing  Goal: Patient will achieve/maintain normal respiratory rate/effort  Description: Respiratory rate and effort will be within normal limits for the patient  12/2/2021 1753 by Dipak Beckman RN  Outcome: Ongoing  12/2/2021 0625 by Alycia Sellers RN  Outcome: Ongoing     Problem: HEMODYNAMIC STATUS  Goal: Patient has stable vital signs and fluid balance  12/2/2021 1753 by Dipak Beckman RN  Outcome: Ongoing  12/2/2021 0625 by Alycia Sellers RN  Outcome: Ongoing     Problem: FLUID AND ELECTROLYTE IMBALANCE  Goal: Fluid and electrolyte balance are achieved/maintained  12/2/2021 1753 by Dipak Beckman RN  Outcome: Ongoing  12/2/2021 0625 by Alycia Sellers RN  Outcome: Ongoing     Problem: ACTIVITY INTOLERANCE/IMPAIRED MOBILITY  Goal: Mobility/activity is maintained at optimum level for patient  12/2/2021 1753 by Dipak Beckman RN  Outcome: Ongoing  12/2/2021 0625 by Alycia Sellers RN  Outcome: Ongoing     Problem: Fluid Volume:  Goal: Will show no signs or symptoms of fluid imbalance  Description: Will show no signs or symptoms of fluid imbalance  12/2/2021 1753 by Dipak Beckman RN  Outcome: Ongoing  12/2/2021 0625 by Alycia Sellers RN  Outcome: Ongoing     Problem: Falls - Risk of:  Goal: Will remain free from falls  Description: Will remain free from falls  Outcome: Ongoing  Goal: Absence of physical injury  Description: Absence of physical injury  Outcome: Ongoing

## 2021-12-02 NOTE — PROGRESS NOTES
Pt took bipap mask off and said he was done with it. Bipap placed on standby at this time. Pt also has removed telemetry leads, pulse ox probe and bp cuff again. I again educated pt that he needs to leave the monitoring in place. Pt is alert and oriented and agrees that the should leave it on however continues to keep removing.

## 2021-12-03 VITALS
HEIGHT: 69 IN | RESPIRATION RATE: 20 BRPM | OXYGEN SATURATION: 95 % | SYSTOLIC BLOOD PRESSURE: 127 MMHG | WEIGHT: 252.43 LBS | BODY MASS INDEX: 37.39 KG/M2 | HEART RATE: 77 BPM | DIASTOLIC BLOOD PRESSURE: 71 MMHG | TEMPERATURE: 97.6 F

## 2021-12-03 LAB
ALBUMIN SERPL-MCNC: 3.6 G/DL (ref 3.4–5)
ANION GAP SERPL CALCULATED.3IONS-SCNC: 16 MMOL/L (ref 3–16)
BUN BLDV-MCNC: 34 MG/DL (ref 7–20)
CALCIUM SERPL-MCNC: 9.4 MG/DL (ref 8.3–10.6)
CHLORIDE BLD-SCNC: 95 MMOL/L (ref 99–110)
CO2: 21 MMOL/L (ref 21–32)
CREAT SERPL-MCNC: 4.3 MG/DL (ref 0.9–1.3)
GFR AFRICAN AMERICAN: 18
GFR NON-AFRICAN AMERICAN: 14
GLUCOSE BLD-MCNC: 144 MG/DL (ref 70–99)
GLUCOSE BLD-MCNC: 145 MG/DL (ref 70–99)
GLUCOSE BLD-MCNC: 254 MG/DL (ref 70–99)
HCT VFR BLD CALC: 33 % (ref 40.5–52.5)
HEMOGLOBIN: 10.9 G/DL (ref 13.5–17.5)
MAGNESIUM: 2.1 MG/DL (ref 1.8–2.4)
MCH RBC QN AUTO: 29.3 PG (ref 26–34)
MCHC RBC AUTO-ENTMCNC: 33.1 G/DL (ref 31–36)
MCV RBC AUTO: 88.7 FL (ref 80–100)
PDW BLD-RTO: 15.5 % (ref 12.4–15.4)
PERFORMED ON: ABNORMAL
PERFORMED ON: ABNORMAL
PHOSPHORUS: 5.5 MG/DL (ref 2.5–4.9)
PLATELET # BLD: 260 K/UL (ref 135–450)
PMV BLD AUTO: 8.2 FL (ref 5–10.5)
POTASSIUM SERPL-SCNC: 3.9 MMOL/L (ref 3.5–5.1)
RBC # BLD: 3.72 M/UL (ref 4.2–5.9)
SODIUM BLD-SCNC: 132 MMOL/L (ref 136–145)
WBC # BLD: 9.9 K/UL (ref 4–11)

## 2021-12-03 PROCEDURE — 2580000003 HC RX 258: Performed by: INTERNAL MEDICINE

## 2021-12-03 PROCEDURE — 85027 COMPLETE CBC AUTOMATED: CPT

## 2021-12-03 PROCEDURE — 5A1D70Z PERFORMANCE OF URINARY FILTRATION, INTERMITTENT, LESS THAN 6 HOURS PER DAY: ICD-10-PCS | Performed by: STUDENT IN AN ORGANIZED HEALTH CARE EDUCATION/TRAINING PROGRAM

## 2021-12-03 PROCEDURE — 6360000002 HC RX W HCPCS: Performed by: INTERNAL MEDICINE

## 2021-12-03 PROCEDURE — 6370000000 HC RX 637 (ALT 250 FOR IP): Performed by: NURSE PRACTITIONER

## 2021-12-03 PROCEDURE — 36415 COLL VENOUS BLD VENIPUNCTURE: CPT

## 2021-12-03 PROCEDURE — 6370000000 HC RX 637 (ALT 250 FOR IP): Performed by: INTERNAL MEDICINE

## 2021-12-03 PROCEDURE — 94660 CPAP INITIATION&MGMT: CPT

## 2021-12-03 PROCEDURE — 83735 ASSAY OF MAGNESIUM: CPT

## 2021-12-03 PROCEDURE — 90935 HEMODIALYSIS ONE EVALUATION: CPT

## 2021-12-03 PROCEDURE — 94640 AIRWAY INHALATION TREATMENT: CPT

## 2021-12-03 PROCEDURE — 6360000002 HC RX W HCPCS

## 2021-12-03 PROCEDURE — 6370000000 HC RX 637 (ALT 250 FOR IP): Performed by: STUDENT IN AN ORGANIZED HEALTH CARE EDUCATION/TRAINING PROGRAM

## 2021-12-03 PROCEDURE — 99232 SBSQ HOSP IP/OBS MODERATE 35: CPT | Performed by: INTERNAL MEDICINE

## 2021-12-03 PROCEDURE — 80069 RENAL FUNCTION PANEL: CPT

## 2021-12-03 RX ADMIN — CLOPIDOGREL BISULFATE 75 MG: 75 TABLET ORAL at 09:08

## 2021-12-03 RX ADMIN — FAMOTIDINE 20 MG: 20 TABLET ORAL at 09:08

## 2021-12-03 RX ADMIN — IPRATROPIUM BROMIDE AND ALBUTEROL SULFATE 1 AMPULE: .5; 3 SOLUTION RESPIRATORY (INHALATION) at 08:40

## 2021-12-03 RX ADMIN — CARVEDILOL 3.12 MG: 3.12 TABLET, FILM COATED ORAL at 09:08

## 2021-12-03 RX ADMIN — HEPARIN SODIUM 5000 UNITS: 5000 INJECTION INTRAVENOUS; SUBCUTANEOUS at 07:23

## 2021-12-03 RX ADMIN — DOCUSATE SODIUM 100 MG: 50 LIQUID ORAL at 09:08

## 2021-12-03 RX ADMIN — HEPARIN SODIUM 4100 UNITS: 1000 INJECTION INTRAVENOUS; SUBCUTANEOUS at 13:33

## 2021-12-03 RX ADMIN — EPOETIN ALFA-EPBX 3000 UNITS: 3000 INJECTION, SOLUTION INTRAVENOUS; SUBCUTANEOUS at 16:44

## 2021-12-03 RX ADMIN — ALTEPLASE 4 MG: 2.2 INJECTION, POWDER, LYOPHILIZED, FOR SOLUTION INTRAVENOUS at 12:35

## 2021-12-03 RX ADMIN — SODIUM CHLORIDE, PRESERVATIVE FREE 10 ML: 5 INJECTION INTRAVENOUS at 09:09

## 2021-12-03 RX ADMIN — OXYCODONE AND ACETAMINOPHEN 1 TABLET: 7.5; 325 TABLET ORAL at 09:13

## 2021-12-03 RX ADMIN — GABAPENTIN 100 MG: 100 CAPSULE ORAL at 09:08

## 2021-12-03 ASSESSMENT — PAIN SCALES - GENERAL: PAINLEVEL_OUTOF10: 8

## 2021-12-03 NOTE — CARE COORDINATION
CASE MANAGEMENT DISCHARGE SUMMARY      Discharge to: home with wife    Precertification completed: NA  Hospital Exemption Notification (HENS) completed: NA    IMM given: NA     New Durable Medical Equipment ordered/agency:NA     Transportation:    Family/car:car       Confirmed discharge plan with:     Patient: yes     RN, name: Nya Chang    Note: Discharging nurse to complete CELINE, reconcile AVS, and place final copy with patient's discharge packet. RN to ensure that written prescriptions for  Level II medications are sent with patient to the facility as per protocol. Referred to patient for d/c planning. Spoke to patient. Patient usually lives at home with wife. Patient reports he is independent in ADLs. Patient denies d/c needs.   Electronically signed by NINO Mendez, SALVADOR-S on 12/3/2021 at 12:42 PM

## 2021-12-03 NOTE — PROGRESS NOTES
Physician Progress Note      Pedro Luis Canada  [facility] #:                  [id number]  :                       1970  ADMIT DATE:       2021 2:02 PM  DISCH DATE:  RESPONDING  PROVIDER #:        Param Alegre          QUERY TEXT:    Pt admitted with acute respiratory failure. Pt noted to have possible sepsis   documented. If possible, please document in progress notes and discharge   summary the present on admission status of ***:    The medical record reflects the following:  Risk Factors: Pneumonia, acute on chronic respiratory failure, ESRD,  Clinical Indicators: WBC 11.2 ->13.6, procal 0.74, Pulse 97. Per progress   note  \"Possible sepsis: He was started on empiric antibiotics for possible   pneumonia\"  Treatment: IV levquin, intubation, serial labs, supportive care    Thank you,  [email or web address]  Options provided:  -- Yes, sepsis was present at the time of the arrival to the hospital  -- No, sepsis was not present on admission and developed during the inpatient   stay  -- Sepsis ruled out:  Sepsis ruled out.  -- Other - I will add my own diagnosis  -- Disagree - Not applicable / Not valid  -- Disagree - Clinically unable to determine / Unknown  -- Refer to Clinical Documentation Reviewer    PROVIDER RESPONSE TEXT:    Sepsis ruled out: Sepsis ruled out.     Query created by: Altagracia Robins on 2021 4:00 PM      Electronically signed by:  Param Alegre 2021 7:29 PM

## 2021-12-03 NOTE — PROGRESS NOTES
INPATIENT PULMONARY CRITICAL CARE PROGRESS NOTE      Reason for visit    acute respiratory failure    SUBJECTIVE: Patient when seen was tolerating extubation well ;patient complaining of some shortness of breath this morning ;no increasing cough;has had some respiratory secretions which  Are somewhat reddish in color;no chest pain/wheezing;patient has some coughing, no chest pain per se, patient to have another cycle of dialysis today;patient does not know his dry weight ;patient does have CPAP/BIPAP at home ; patient was afebrile and patient's blood pressure was slightly on the higher side, patient's blood sugars were slightly on the higher side but acceptable  patient had sinus rhythm on the monitor, , patient's cumulative fluid balance was - 8.9 L, patient was eating his breakfast without any issues no other pertinent review of system of concern            Physical Exam:  Blood pressure (!) 174/86, pulse 93, temperature 97.8 °F (36.6 °C), temperature source Oral, resp. rate 16, height 5' 9\" (1.753 m), weight 252 lb 9.6 oz (114.6 kg), SpO2 95 %.'     Constitutional:  No acute distress. HENT: Endotracheal tube present. No thyromegaly. Eyes:  Conjunctivae are normal. Pupils equal, round, and reactive to light. No scleral icterus. Neck: . No tracheal deviation present. No obvious thyroid mass. Short enlarged neck  Cardiovascular: Normal rate, regular rhythm, normal heart sounds. No right ventricular heave some lower extremity edema. Pulmonary/Chest: No wheezes. Decreased Bilateral rales. Chest wall is not dull to percussion. No accessory muscle usage or stridor. Abdominal: Soft. Bowel sounds present. No distension or hernia. No tenderness. Musculoskeletal: No cyanosis. No clubbing. No obvious joint deformity. Lymphadenopathy: No cervical or supraclavicular adenopathy. Skin: Skin is warm and dry. No rash or nodules on the exposed extremities. ,  Tattoos present  Neurologic:  Alert and communicative with no focal cranial nerve deficits           Results:  CBC:   Recent Labs     12/01/21 0417 12/02/21 0417 12/03/21  0716   WBC 10.6 11.2* 9.9   HGB 8.8* 10.1* 10.9*   HCT 26.3* 30.8* 33.0*   MCV 89.1 86.7 88.7    260 260     BMP:   Recent Labs     12/01/21 0417 12/02/21 0417 12/03/21  0716    133* 132*   K 3.6 3.4* 3.9   CL 98* 95* 95*   CO2 22 23 21   PHOS 5.1* 4.3 5.5*   BUN 52* 36* 34*   CREATININE 5.6* 4.6* 4.3*     Imaging:  I have reviewed radiology images personally. XR CHEST PORTABLE   Final Result   1. Appropriate line and tube positioning without visualization of the tip of   the enteric tube. 2. Bibasilar hazy opacities likely represent atelectasis and layering pleural   fluid. Superimposed parenchymal process such as pneumonia cannot be excluded. XR ABDOMEN (KUB) (SINGLE AP VIEW)   Final Result      XR ABDOMEN (KUB) (SINGLE AP VIEW)   Final Result   1. The endotracheal tube tip is 4.5 cm above the maryam. 2. The enteric tube tip is in the stomach though the side hole appears to be   in the esophagus. Recommend advancing the enteric tube 6 cm. XR CHEST PORTABLE   Final Result   1. The endotracheal tube tip is 4.5 cm above the maryam. 2. The enteric tube tip is in the stomach though the side hole appears to be   in the esophagus. Recommend advancing the enteric tube 6 cm. XR CHEST PORTABLE   Final Result   1. Pulmonary vascular congestion. 2. Left basilar opacity likely represents a combination of small pleural   fluid and atelectasis. Likely trace right pleural fluid. Superimposed   infectious parenchymal process cannot be excluded. Results for Abhi Fer (MRN [id number]) as of 12/3/2021 09:51   Ref.  Range 12/2/2021 16:03 12/2/2021 20:07 12/3/2021 07:16 12/3/2021 08:15   Sodium Latest Ref Range: 136 - 145 mmol/L   132 (L)    Potassium Latest Ref Range: 3.5 - 5.1 mmol/L   3.9    Chloride Latest Ref Range: 99 - 110 mmol/L   95 (L) CO2 Latest Ref Range: 21 - 32 mmol/L   21    BUN Latest Ref Range: 7 - 20 mg/dL   34 (H)    Creatinine Latest Ref Range: 0.9 - 1.3 mg/dL   4.3 (H)    Anion Gap Latest Ref Range: 3 - 16    16    GFR Non- Latest Ref Range: >60    14 (A)    GFR  Latest Ref Range: >60    18 (A)    Magnesium Latest Ref Range: 1.80 - 2.40 mg/dL   2.10    Glucose Latest Ref Range: 70 - 99 mg/dL   145 (H)    POC Glucose Latest Ref Range: 70 - 99 mg/dl 181 (H) 223 (H)  144 (H)   Calcium Latest Ref Range: 8.3 - 10.6 mg/dL   9.4    Phosphorus Latest Ref Range: 2.5 - 4.9 mg/dL   5.5 (H)    Albumin Latest Ref Range: 3.4 - 5.0 g/dL   3.6    WBC Latest Ref Range: 4.0 - 11.0 K/uL   9.9    RBC Latest Ref Range: 4.20 - 5.90 M/uL   3.72 (L)    Hemoglobin Quant Latest Ref Range: 13.5 - 17.5 g/dL   10.9 (L)    Hematocrit Latest Ref Range: 40.5 - 52.5 %   33.0 (L)    MCV Latest Ref Range: 80.0 - 100.0 fL   88.7    MCH Latest Ref Range: 26.0 - 34.0 pg   29.3    MCHC Latest Ref Range: 31.0 - 36.0 g/dL   33.1    MPV Latest Ref Range: 5.0 - 10.5 fL   8.2    RDW Latest Ref Range: 12.4 - 15.4 %   15.5 (H)    Platelet Count Latest Ref Range: 135 - 450 K/uL   260        Assessment:  Active Problems:    S/P TAVR (transcatheter aortic valve replacement)    Nonischemic cardiomyopathy (HCC)    ZAINAB on CPAP    Hyperkalemia    ESRD (end stage renal disease) on dialysis (HCC)    CHF (congestive heart failure), NYHA class I, acute on chronic, combined (HCC)    Acute respiratory failure with hypercapnia (HCC)    Acute pulmonary edema (HCC)    Obesity, Class II, BMI 35-39.9    Grade II diastolic dysfunction    Shock circulatory ([facility])    Smoker    DM (diabetes mellitus), secondary uncontrolled (HCC)    Normocytic normochromic anemia  Resolved Problems:    * No resolved hospital problems.  *          Plan:   · Oxygen supplementation,if required,  to keep saturation being 90-94% only  · Patient was on RA when seen   · BIPAP on intermittent basis -needs to continue and be complaint with it at home   · Pulmonary toilet  · Patient plan to get received another cycle of dialysis today-patient's nephrologist to consider decreasing the dry weight ,if deemed appropriate   · Patient does not need any antibiotics from pulmonary standpoint of view  · Patient's echocardiogram in the recent past was reviewed  · Antihypertensives as per IM/nephrology   · Bronchodilators  · Lantus insulin as per blood glucose monitoring as per IM  · Blood glucose monitoring with sliding scale insulin  · Trend hemodynamics and cardiac rhythm closely  · Diet and life style modifications  · Patient needs to lose weight   · PUD and DVT prophylaxis     Case discussed with patient and nursing     ? Discharge planning after HD     No other recommendations from Pulmonary/CCM stand p[oint -will sign off -please call on PRN basis,if patient is not discharged         Electronically signed by:  Jeremiah Johnson MD    12/3/2021    9:50 AM.

## 2021-12-03 NOTE — DISCHARGE SUMMARY
Hospital Medicine Discharge Summary    Patient ID: Misty Shock      Patient's PCP: León Cruz MD    Admit Date: 11/29/2021     Discharge Date:   12/03/2021    Admitting Physician: Jose Burks MD     Discharge Physician: Magali Hernandez MD     Discharge Diagnoses: Active Hospital Problems    Diagnosis     S/P TAVR (transcatheter aortic valve replacement) [Z95.2]      Priority: Medium    Acute respiratory failure with hypercapnia (HCC) [J96.02]     Acute pulmonary edema (HCC) [J81.0]     Obesity, Class II, BMI 35-39.9 [E66.9]     Grade II diastolic dysfunction [V92.32]     Shock circulatory (HCC) [R57.9]     Smoker [F17.200]     DM (diabetes mellitus), secondary uncontrolled ([address] [E13.65]     Normocytic normochromic anemia [D64.9]     CHF (congestive heart failure), NYHA class I, acute on chronic, combined ([address] [I50.43]     ESRD (end stage renal disease) on dialysis ([address] [N18.6, Z99.2]     Hyperkalemia [E87.5]     ZAINAB on CPAP [G47.33, Z99.89]     Nonischemic cardiomyopathy ([address] [I42.8]        The patient was seen and examined on day of discharge and this discharge summary is in conjunction with any daily progress note from day of discharge. Hospital Course:   46 y.o. male who presented to [facility] with shortness of breath during dialysis found to have acute hypoxic respiratory failure secondary to acute on chronic combined systolic and diastolic HF. Intubated and now extubated 12/01. On RA. Underwent scheduled iHD 12/03 and medically ready for dc. Acute and chronic combined systolic and diastolic CHF  -patient was started on IV Lasix but did not pass urine. He developed worsening respiratory status and required intubation.  -Continue fluid management with hemodialysis per nephrology.   -Reccent echocardiogram showed ejection fraction of 43% and diastolic dysfunction grade 2  - suspect etiology of decompensation was poor dietary habits and lack of fluid restriction over thanksgiving, per pt.     Acute hypoxic respiratory failure-secondary to above pulmonary edema/CHF. Resolved. On RA     ESRD on hemodialysis: Complicated by severe metabolic acidosis and hyperkalemia. Hyperkalemia required medical management initially. Typically he gets dialysis Monday Wednesday Friday but on day of presentation he could not complete his dialysis. Nephrology following. Resume hemodialysis as scheduled.     Possible sepsis: He was started on empiric antibiotics for possible pneumonia. Procalcitonin was mildly elevated although in the face of ESRD significance is unclear. Covid testing was negative. Monitor. De-escalate antibiotic 11/30.     Diabetes- Improving with resumption of basal bolus insulin regimen. Monitor.     Elevated troponin-secondary to ESRD but flat trend     Morbid Obesity -  With Body mass index is 39.43 kg/m². Complicating assessment and treatment. Placing patient at risk for multiple co-morbidities as well as early death and contributing to the patient's presentation.      Aortic stenosis: Stable.      ZAINAB-CPAP at home     COPD-no exacerbation     Remote history of DVT        Physical Exam Performed:     BP (!) 161/48   Pulse 77   Temp 98 °F (36.7 °C)   Resp 20   Ht 5' 9\" (1.753 m)   Wt 256 lb 6.3 oz (116.3 kg)   SpO2 95%   BMI 37.86 kg/m²       General appearance:  Laying in bed comfortably on RA  Neck: Supple, with full range of motion. Respiratory:  Normal respiratory effort. On RA  Cardiovascular:  Regular rate and rhythm with normal S1/S2 without murmurs, rubs or gallops. Abdomen: Soft, non-tender, non-distended with normal bowel sounds. Musculoskeletal:  No clubbing, cyanosis or edema bilaterally. Full range of motion without deformity. Skin: Skin color, texture, turgor normal.  No rashes or lesions. Neurologic:  Neurovascularly intact without any focal sensory/motor deficits.  Cranial nerves: II-XII intact, grossly non-focal.  Psychiatric:  Alert and oriented, thought content appropriate, normal insight      Labs: For convenience and continuity at follow-up the following most recent labs are provided:      CBC:    Lab Results   Component Value Date    WBC 9.9 12/03/2021    HGB 10.9 12/03/2021    HCT 33.0 12/03/2021     12/03/2021       Renal:    Lab Results   Component Value Date     12/03/2021    K 3.9 12/03/2021    K 5.2 11/29/2021    CL 95 12/03/2021    CO2 21 12/03/2021    BUN 34 12/03/2021    CREATININE 4.3 12/03/2021    CALCIUM 9.4 12/03/2021    PHOS 5.5 12/03/2021         Significant Diagnostic Studies    Radiology:   XR CHEST PORTABLE   Final Result   1. Appropriate line and tube positioning without visualization of the tip of   the enteric tube. 2. Bibasilar hazy opacities likely represent atelectasis and layering pleural   fluid. Superimposed parenchymal process such as pneumonia cannot be excluded. XR ABDOMEN (KUB) (SINGLE AP VIEW)   Final Result      XR ABDOMEN (KUB) (SINGLE AP VIEW)   Final Result   1. The endotracheal tube tip is 4.5 cm above the maryam. 2. The enteric tube tip is in the stomach though the side hole appears to be   in the esophagus. Recommend advancing the enteric tube 6 cm. XR CHEST PORTABLE   Final Result   1. The endotracheal tube tip is 4.5 cm above the maryam. 2. The enteric tube tip is in the stomach though the side hole appears to be   in the esophagus. Recommend advancing the enteric tube 6 cm. XR CHEST PORTABLE   Final Result   1. Pulmonary vascular congestion. 2. Left basilar opacity likely represents a combination of small pleural   fluid and atelectasis. Likely trace right pleural fluid. Superimposed   infectious parenchymal process cannot be excluded.                 Consults:     IP CONSULT TO NEPHROLOGY  IP CONSULT TO HOSPITALIST  IP CONSULT TO HEART FAILURE NURSE/COORDINATOR  IP CONSULT TO DIETITIAN  IP CONSULT TO NEPHROLOGY  IP CONSULT TO DIETITIAN  IP CONSULT TO CRITICAL CARE    Disposition:  home     Condition at Discharge: Stable    Discharge Instructions/Follow-up:  Home PCP and nephrology f/u    Code Status:  Full Code     Activity: activity as tolerated    Diet: renal diet      Discharge Medications:     Current Discharge Medication List           Details   carvedilol (COREG) 12.5 MG tablet TAKE 1 TABLET BY MOUTH TWICE DAILY WITH MEALS  Qty: 60 tablet, Refills: 10      torsemide (DEMADEX) 100 MG tablet Take 1 tablet by mouth daily  Qty: 30 tablet, Refills: 3      nitroGLYCERIN (NITROSTAT) 0.4 MG SL tablet DISSOLVE 1 TABLET UNDER THE TONGUE AS NEEDED FOR CHEST PAIN EVERY 5 MINUTES UP TO 3 TIMES. IF NO RELIEF CALL 911.   Qty: 25 tablet, Refills: 10    Associated Diagnoses: Coronary artery disease involving native heart without angina pectoris, unspecified vessel or lesion type      gabapentin (NEURONTIN) 100 MG capsule TAKE 1-2 CAPSULES BY MOUTH THREE TIMES A DAY  Qty: 180 capsule, Refills: 5    Associated Diagnoses: Chronic pain syndrome      albuterol (PROVENTIL) (2.5 MG/3ML) 0.083% nebulizer solution INHALE 1 VIAL VIA NEBULIZER EVERY 6 HOURS AS NEEDED FOR WHEEZING  Qty: 300 mL, Refills: 5      clopidogrel (PLAVIX) 75 MG tablet TAKE 1 TABLET BY MOUTH ONCE DAILY  Qty: 90 tablet, Refills: 1       MG capsule TAKE 1 CAPSULE BY MOUTH TWICE DAILY  Qty: 60 capsule, Refills: 5    Associated Diagnoses: Constipation, unspecified constipation type      cyclobenzaprine (FLEXERIL) 10 MG tablet TAKE (1) TABLET BY MOUTH EVERY 8 HOURS AS NEEDED  Qty: 90 tablet, Refills: 2    Associated Diagnoses: Chronic pain syndrome      QUEtiapine (SEROQUEL) 50 MG tablet TAKE 1 TABLET BY MOUTH IN THE EVENING  Qty: 30 tablet, Refills: 2    Associated Diagnoses: Insomnia, unspecified type; Mood disorder (HCC)      pantoprazole (PROTONIX) 40 MG tablet TAKE 1 TABLET BY MOUTH EVERY MORNING BEFORE BREAKFAST  Qty: 30 tablet, Refills: 1    Associated Diagnoses: Comments: **Patient requests 90 days supply**  Associated Diagnoses: Peripheral polyneuropathy      LINZESS 145 MCG capsule TAKE 1 CAPSULE BY MOUTH EVERY MORNING BEFORE BREAKFAST  Qty: 30 capsule, Refills: 10    Associated Diagnoses: Chronic idiopathic constipation      !! glucose monitoring kit (FREESTYLE) monitoring kit 1 kit by Does not apply route daily  Qty: 1 kit, Refills: 0    Comments: Which ever is coverd. !! blood glucose test strips (GLUCOSE METER TEST) strip 1 each by In Vitro route 5 times daily As needed. Qty: 100 each, Refills: 3      insulin aspart (NOVOLOG FLEXPEN) 100 UNIT/ML injection pen Inject 20 Units into the skin 3 times daily (before meals)  Qty: 15 pen, Refills: 5    Associated Diagnoses: Type 2 diabetes mellitus with diabetic nephropathy, with long-term current use of insulin ([address]      ! ! blood glucose test strips (FREESTYLE LITE) strip Daily As needed. Qty: 100 strip, Refills: 3    Comments: Please dispense what is covered by insurance      !! glucose monitoring kit (FREESTYLE) monitoring kit 1 kit by Does not apply route daily  Qty: 1 kit, Refills: 0    Comments: Please dispense what is covered by insurance      vitamin D (ERGOCALCIFEROL) 86182 units CAPS capsule TK 1 C PO WEEKLY  Refills: 11      Tiotropium Bromide-Olodaterol (STIOLTO RESPIMAT) 2.5-2.5 MCG/ACT AERS Inhale 2 puffs into the lungs daily  Qty: 2 Inhaler, Refills: 0    Comments: 2 samples given:  Lot #[id number], Exp 7/21 & Lot #[id number], Exp 7/21      Polyethylene Glycol 3350 GRAN       !! Glucose Blood (BLOOD GLUCOSE TEST STRIPS) STRP TEST 3-4 TIMES DAILY, AS DIRECTED  Qty: 100 strip, Refills: 3      Blood Glucose Monitoring Suppl ADAM USE AS DIRECTED. Qty: 1 Device, Refills: 0    Comments: WITH CONTROL SOLUTION.       Alcohol Swabs PADS USE AS DIRECTED  Qty: 300 each, Refills: 3      albuterol sulfate  (90 Base) MCG/ACT inhaler Inhale 2 puffs into the lungs every 6 hours as needed for Wheezing  Qty: 1 Inhaler, Refills: 3      ipratropium-albuterol (DUONEB) 0.5-2.5 (3) MG/3ML SOLN nebulizer solution Inhale 3 mLs into the lungs every 6 hours as needed for Shortness of Breath  Qty: 360 mL, Refills: 1      calcium carbonate (TUMS) 500 MG chewable tablet Take 1 tablet by mouth 3 times daily as needed for Heartburn. !! - Potential duplicate medications found. Please discuss with provider. Time Spent on discharge is more than 45 minutes in the examination, evaluation, counseling and review of medications and discharge plan. Signed:    Aida Jordan MD   12/3/2021      Thank you Jacki Moss MD for the opportunity to be involved in this patient's care. If you have any questions or concerns please feel free to contact me at [phone].

## 2021-12-03 NOTE — FLOWSHEET NOTE
12/03/21 1215 12/03/21 1708   Vital Signs   BP (!) 161/48 127/71   Temp 98 °F (36.7 °C) 97.6 °F (36.4 °C)   Pulse 77 77   Resp  --  20   Weight 256 lb 6.3 oz (116.3 kg) 252 lb 6.8 oz (114.5 kg)     Treatment time: 210min    Net UF: 1800ml    Pre weight: 116.3k  Post weight: 114.5k  EDW: 116k    Access used: BRANDON TDC  Access function: Cath cathi x1 both port for 45min then worked well with reversed. Medications or blood products given: Epogen 3000units IV    Regular outpatient schedule: MWF    Summary of response to treatment: Tolerated well. Copy of dialysis treatment record placed in chart, to be scanned into EMR.

## 2021-12-03 NOTE — PROGRESS NOTES
[facility] Progress Note           Subjective/   46y.o. year old male who we are seeing in consultation for ESRD on HD. HPI:  HD on 12/3 w 2l UFG; cath flow used as line not working    HD on 12/2 w 2.7 l UF and post wt 118.4 kg   HD on 12/1 with 2.8 liters removed, post-weight of 119 kg. ROS:  Out of ICU on 12/2    Objective/   GEN:  Chronically ill, BP (!) 161/48   Pulse 77   Temp 98 °F (36.7 °C)   Resp 20   Ht 5' 9\" (1.753 m)   Wt 256 lb 6.3 oz (116.3 kg)   SpO2 95%   BMI 37.86 kg/m²   HEENT: non-icteric, no JVD  CV: S1, S2 without m/r/g; + LE edema  RESP: CTA B without w/r/r; breathing wnl  ABD: +bs, soft, nt, no hsm  SKIN: warm, no rashes  ACCESS: Left [facility]    Data/  Recent Labs     12/01/21 0417 12/02/21 0417 12/03/21  0716   WBC 10.6 11.2* 9.9   HGB 8.8* 10.1* 10.9*   HCT 26.3* 30.8* 33.0*   MCV 89.1 86.7 88.7    260 260     Recent Labs     12/01/21 0417 12/02/21 0417 12/03/21  0716    133* 132*   K 3.6 3.4* 3.9   CL 98* 95* 95*   CO2 22 23 21   GLUCOSE 164* 92 145*   PHOS 5.1* 4.3 5.5*   MG 2.00 1.90 2.10   BUN 52* 36* 34*   CREATININE 5.6* 4.6* 4.3*   LABGLOM 11* 13* 14*   GFRAA 13* 16* 18*       Assessment/     - End stage renal disease - on HD Mon-Wed-Fri     - Acute hypoxic, hypercarbic respiratory failure/Pulmonary edema     - Anion-gap metabolic acidosis in setting of hyperglycemia     - Hyperkalemia - in setting of translocation with hyperglycemia     - Hyponatremia - in setting of translocation with hyperglycemia     - Hypertension - labile, high on admission, hypotensive requiring pressors, now off     - Anemia of chronic disease - Hb at target, Epogen 1,600 units qHD    Plan/     - HD per MWF schedule    -  kg  - epo as ordered  - Trend labs, bp's     ____________________________________  Dominic Zurita MD  [facility]  [email or web address]  Office: [phone]

## 2021-12-04 NOTE — PROGRESS NOTES
Patient requested to be put in lobby and stated wife will be here in a few minutes. Stable and ambulatory.

## 2021-12-06 ENCOUNTER — FOLLOWUP TELEPHONE ENCOUNTER (OUTPATIENT)
Dept: TELEMETRY | Age: 51
End: 2021-12-06

## 2021-12-06 ENCOUNTER — CARE COORDINATION (OUTPATIENT)
Dept: CASE MANAGEMENT | Age: 51
End: 2021-12-06

## 2021-12-06 NOTE — TELEPHONE ENCOUNTER
1st Attempt; No Answer- Left HIPAA compliant voicemail with Non-Urgent Heart Failure Resource Line number for call back.        Brandan Pacheco RN

## 2021-12-06 NOTE — TELEPHONE ENCOUNTER
Spoke to patient for hospital follow up. States he doesn't remember much from visit with us. States that he does not weigh self regularly. Discussed importance of daily monitoring for s/s of CHF. Discussed those symptoms. Pt states he has some leg swelling since returning home from hospital and shortness of breath but also stated this was baseline for him. Pt aware of follow up with Vinnie Bernardo MD on 12/10/21 at 0915. Denies any needs or concerns.  Hodan Calvert RN

## 2021-12-06 NOTE — CARE COORDINATION
[address] Transitions Initial Follow Up Call    Call within 2 business days of discharge: Yes    Patient: Анна Dumas Patient : 1970   MRN: <[id number]>  Reason for Admission: ESRD, CHF  Discharge Date: 12/3/21 RARS: Readmission Risk Score: 41.2 ( )      Last Discharge [facility]       Complaint Diagnosis Description Type Department Provider    21 Shortness of Breath ESRD (end stage renal disease) on dialysis ([facility] . .. ED to Hosp-Admission (Discharged) (ADMITTED) Cy Shepherd MD; Paty Headley. .. Attempted to reach pt for follow up call. Left message requesting call back.     Care Transitions 24 Hour Call    Do you have all of your prescriptions and are they filled?: Yes  Care Transitions Interventions         Follow Up  Future Appointments   Date Time Provider Nelly Darby   12/10/2021  9:15 AM Marylin Jensen  Renan Stone

## 2021-12-07 ENCOUNTER — CARE COORDINATION (OUTPATIENT)
Dept: CASE MANAGEMENT | Age: 51
End: 2021-12-07

## 2021-12-10 ENCOUNTER — VIRTUAL VISIT (OUTPATIENT)
Dept: FAMILY MEDICINE CLINIC | Age: 51
End: 2021-12-10
Payer: COMMERCIAL

## 2021-12-10 DIAGNOSIS — G62.9 PERIPHERAL POLYNEUROPATHY: Primary | ICD-10-CM

## 2021-12-10 DIAGNOSIS — G89.4 CHRONIC PAIN SYNDROME: ICD-10-CM

## 2021-12-10 PROCEDURE — 3017F COLORECTAL CA SCREEN DOC REV: CPT | Performed by: FAMILY MEDICINE

## 2021-12-10 PROCEDURE — 99214 OFFICE O/P EST MOD 30 MIN: CPT | Performed by: FAMILY MEDICINE

## 2021-12-10 PROCEDURE — 1111F DSCHRG MED/CURRENT MED MERGE: CPT | Performed by: FAMILY MEDICINE

## 2021-12-10 PROCEDURE — G8427 DOCREV CUR MEDS BY ELIG CLIN: HCPCS | Performed by: FAMILY MEDICINE

## 2021-12-10 RX ORDER — OXYCODONE AND ACETAMINOPHEN 7.5; 325 MG/1; MG/1
1 TABLET ORAL EVERY 6 HOURS PRN
Qty: 120 TABLET | Refills: 0 | Status: SHIPPED | OUTPATIENT
Start: 2021-12-10 | End: 2022-01-07 | Stop reason: SDUPTHER

## 2021-12-10 NOTE — PROGRESS NOTES
Luis Kearns (:  1970) is a 46 y.o. male,Established patient, here for evaluation of the following chief complaint(s): Follow-Up from Hospital         ASSESSMENT/PLAN:  1. Peripheral polyneuropathy  -     [facility] Physical [facility]  2. Chronic pain syndrome  -     [facility] Physical [facility]  -     oxyCODONE-acetaminophen (PERCOCET) 7.5-325 MG per tablet; Take 1 tablet by mouth every 6 hours as needed (pain) for up to 30 days. , Disp-120 tablet, R-0Normal      No follow-ups on file. SUBJECTIVE/OBJECTIVE:  Luis Kearns is a 46 y.o. male. Patient presents with: Follow-Up from Hospital      Patient was at dialysis and \"zoned out\", woke up in hospital room. Has trouble walking. Patient notes that he is having a lot of times a lot of pain and difficulty ambulating. Notices that he is having trouble feeling his feet. Diabetes has been poorly controlled in past.  Patient continues on dialysis. Patient notes that he is very sedentary. The patients PMH, surgical history, family history, medications, allergies were all reviewed and updated as appropriate today.         Review of Systems    Patient-Reported Vitals 2021   Patient-Reported Weight 252lb   Patient-Reported Height -   Patient-Reported Systolic -   Patient-Reported Diastolic -   Patient-Reported Pulse -   Patient-Reported SpO2 -        Physical Exam    [INSTRUCTIONS:  \"[x]\" Indicates a positive item  \"[]\" Indicates a negative item  -- DELETE ALL ITEMS NOT EXAMINED]    Constitutional: [x] Appears well-developed and well-nourished [x] No apparent distress      [] Abnormal -     Mental status: [x] Alert and awake  [x] Oriented to person/place/time [x] Able to follow commands    [] Abnormal -     Eyes:   EOM    [x]  Normal    [] Abnormal -   Sclera  [x]  Normal    [] Abnormal -          Discharge [x]  None visible   [] Abnormal -     HENT: [x] Normocephalic, atraumatic  [] Abnormal -   [x] Mouth/Throat: Mucous membranes are moist    External Ears [x] Normal  [] Abnormal -    Neck: [x] No visualized mass [] Abnormal -     Pulmonary/Chest: [x] Respiratory effort normal   [x] No visualized signs of difficulty breathing or respiratory distress        [] Abnormal -      Musculoskeletal:   [x] Normal gait with no signs of ataxia         [x] Normal range of motion of neck        [] Abnormal -     Neurological:        [x] No Facial Asymmetry (Cranial nerve 7 motor function) (limited exam due to video visit)          [x] No gaze palsy        [] Abnormal -          Skin:        [x] No significant exanthematous lesions or discoloration noted on facial skin         [] Abnormal -            Psychiatric:       [x] Normal Affect [] Abnormal -        [x] No Hallucinations    Other pertinent observable physical exam findings:-          On this date 12/10/2021 I have spent 30 minutes reviewing previous notes, test results and face to face (virtual) with the patient discussing the diagnosis and importance of compliance with the treatment plan as well as documenting on the day of the visit. Colin Patterson, was evaluated through a synchronous (real-time) audio-video encounter. The patient (or guardian if applicable) is aware that this is a billable service. Verbal consent to proceed has been obtained within the past 12 months. The visit was conducted pursuant to the emergency declaration under the [address] [place] [address] authority and the [employer] General Act. Patient identification was verified, and a caregiver was present when appropriate. The patient was located in a state where the provider was credentialed to provide care. An electronic signature was used to authenticate this note.     --Len Escobedo MD

## 2021-12-21 DIAGNOSIS — K21.9 GASTROESOPHAGEAL REFLUX DISEASE WITHOUT ESOPHAGITIS: ICD-10-CM

## 2021-12-21 RX ORDER — PANTOPRAZOLE SODIUM 40 MG/1
TABLET, DELAYED RELEASE ORAL
Qty: 30 TABLET | Refills: 1 | Status: SHIPPED | OUTPATIENT
Start: 2021-12-21 | End: 2022-02-10

## 2021-12-22 ENCOUNTER — TELEPHONE (OUTPATIENT)
Dept: FAMILY MEDICINE CLINIC | Age: 51
End: 2021-12-22

## 2021-12-23 ENCOUNTER — NURSE TRIAGE (OUTPATIENT)
Dept: OTHER | Facility: CLINIC | Age: 51
End: 2021-12-23

## 2021-12-23 ENCOUNTER — TELEPHONE (OUTPATIENT)
Dept: FAMILY MEDICINE CLINIC | Age: 51
End: 2021-12-23

## 2021-12-23 RX ORDER — METOPROLOL SUCCINATE 25 MG/1
25 TABLET, EXTENDED RELEASE ORAL DAILY
Qty: 90 TABLET | Refills: 3 | Status: ON HOLD | OUTPATIENT
Start: 2021-12-23 | End: 2022-01-17 | Stop reason: HOSPADM

## 2021-12-23 NOTE — TELEPHONE ENCOUNTER
Received call from Mychal Elmore at [facility] with Red Flag Complaint. Subjective: Caller states \"knee pain\"     Current Symptoms: Tripped on curb and fell yesterday, landed on hands and knees. Stated pain is worse on R, skin is scabbed and red. States pain worse with blanket touching it while in bed last night. Has also aggravated chronic low back and hip pain that he has    Onset: 1 day ago; sudden    Associated Symptoms: reduced activity    Pain Severity: 7/10; sharp; constant    Temperature: denies    What has been tried: nothing    LMP: NA Pregnant: NA    Recommended disposition: pcp/ucc today or tomorrow     Care advice provided, patient verbalizes understanding; denies any other questions or concerns; instructed to call back for any new or worsening symptoms. Writer provided warm transfer to [facility] at [facility] for appointment scheduling     Attention Provider: Thank you for allowing me to participate in the care of your patient. The patient was connected to triage in response to information provided to the ECC/PSC. Please do not respond through this encounter as the response is not directed to a shared pool.       Reason for Disposition   Large swelling or bruise and size > palm of person's hand    Protocols used: KNEE INJURY-ADULT-[place]

## 2021-12-23 NOTE — TELEPHONE ENCOUNTER
Called patient to get more information. He said he fell yesterday on his way into [place]. He went to step up on the curb and missed. He fell on both of his knees and caught himself with his wrists. He said he is having a lot of pain in both wrists and both of his knees are swollen and painful. He also said he twisted his right ankle getting up. Right now he is elevating his legs and using ice with minimal relief. He is not taking anything OTC for pain. He did not hit his head or anything else. No other symptoms. Please advise.

## 2021-12-27 ENCOUNTER — HOSPITAL ENCOUNTER (EMERGENCY)
Age: 51
Discharge: LEFT BEFORE DISCHARGE | End: 2021-12-27

## 2022-01-07 DIAGNOSIS — G89.4 CHRONIC PAIN SYNDROME: ICD-10-CM

## 2022-01-07 RX ORDER — OXYCODONE AND ACETAMINOPHEN 7.5; 325 MG/1; MG/1
1 TABLET ORAL EVERY 6 HOURS PRN
Qty: 120 TABLET | Refills: 0 | Status: SHIPPED | OUTPATIENT
Start: 2022-01-10 | End: 2022-02-07 | Stop reason: SDUPTHER

## 2022-01-07 NOTE — TELEPHONE ENCOUNTER
Last Office Visit  -  12/10/21  Next Office Visit  -      Last Filled  -  12/10/21  Last UDS -    Contract -

## 2022-01-07 NOTE — TELEPHONE ENCOUNTER
Patient contacted the office req a refill on his Percocet  Last visit 12/10/21  No future  [address]

## 2022-01-12 ENCOUNTER — APPOINTMENT (OUTPATIENT)
Dept: GENERAL RADIOLOGY | Age: 52
DRG: 246 | End: 2022-01-12
Payer: COMMERCIAL

## 2022-01-12 ENCOUNTER — HOSPITAL ENCOUNTER (INPATIENT)
Age: 52
LOS: 5 days | Discharge: HOME | DRG: 246 | End: 2022-01-17
Attending: EMERGENCY MEDICINE | Admitting: INTERNAL MEDICINE
Payer: COMMERCIAL

## 2022-01-12 DIAGNOSIS — Z79.4 TYPE 2 DIABETES MELLITUS WITH DIABETIC NEPHROPATHY, WITH LONG-TERM CURRENT USE OF INSULIN (HCC): ICD-10-CM

## 2022-01-12 DIAGNOSIS — I21.4 NSTEMI (NON-ST ELEVATED MYOCARDIAL INFARCTION) (HCC): ICD-10-CM

## 2022-01-12 DIAGNOSIS — J81.0 ACUTE PULMONARY EDEMA (HCC): ICD-10-CM

## 2022-01-12 DIAGNOSIS — E11.21 TYPE 2 DIABETES MELLITUS WITH DIABETIC NEPHROPATHY, WITH LONG-TERM CURRENT USE OF INSULIN (HCC): ICD-10-CM

## 2022-01-12 DIAGNOSIS — J96.01 ACUTE RESPIRATORY FAILURE WITH HYPOXIA (HCC): Primary | ICD-10-CM

## 2022-01-12 LAB
A/G RATIO: 1 (ref 1.1–2.2)
ALBUMIN SERPL-MCNC: 3.6 G/DL (ref 3.4–5)
ALP BLD-CCNC: 122 U/L (ref 40–129)
ALT SERPL-CCNC: 11 U/L (ref 10–40)
ANION GAP SERPL CALCULATED.3IONS-SCNC: 20 MMOL/L (ref 3–16)
ANTI-XA UNFRAC HEPARIN: 0.13 IU/ML (ref 0.3–0.7)
ANTI-XA UNFRAC HEPARIN: 0.4 IU/ML (ref 0.3–0.7)
AST SERPL-CCNC: 15 U/L (ref 15–37)
BASE EXCESS VENOUS: -1.2 MMOL/L (ref -3–3)
BASOPHILS ABSOLUTE: 0.1 K/UL (ref 0–0.2)
BASOPHILS RELATIVE PERCENT: 0.9 %
BILIRUB SERPL-MCNC: <0.2 MG/DL (ref 0–1)
BUN BLDV-MCNC: 74 MG/DL (ref 7–20)
CALCIUM SERPL-MCNC: 9.2 MG/DL (ref 8.3–10.6)
CARBOXYHEMOGLOBIN: 3.8 % (ref 0–1.5)
CHLORIDE BLD-SCNC: 91 MMOL/L (ref 99–110)
CO2: 22 MMOL/L (ref 21–32)
CREAT SERPL-MCNC: 6.6 MG/DL (ref 0.9–1.3)
EKG ATRIAL RATE: 97 BPM
EKG DIAGNOSIS: NORMAL
EKG P AXIS: 19 DEGREES
EKG P-R INTERVAL: 164 MS
EKG Q-T INTERVAL: 368 MS
EKG QRS DURATION: 90 MS
EKG QTC CALCULATION (BAZETT): 467 MS
EKG R AXIS: -25 DEGREES
EKG T AXIS: 96 DEGREES
EKG VENTRICULAR RATE: 97 BPM
EOSINOPHILS ABSOLUTE: 0.4 K/UL (ref 0–0.6)
EOSINOPHILS RELATIVE PERCENT: 3.1 %
FERRITIN: 624 NG/ML (ref 30–400)
GFR AFRICAN AMERICAN: 11
GFR NON-AFRICAN AMERICAN: 9
GLUCOSE BLD-MCNC: 279 MG/DL (ref 70–99)
GLUCOSE BLD-MCNC: 336 MG/DL (ref 70–99)
GLUCOSE BLD-MCNC: 338 MG/DL (ref 70–99)
GLUCOSE BLD-MCNC: 347 MG/DL (ref 70–99)
GLUCOSE BLD-MCNC: 381 MG/DL (ref 70–99)
HCO3 VENOUS: 23.4 MMOL/L (ref 23–29)
HCT VFR BLD CALC: 30.8 % (ref 40.5–52.5)
HEMOGLOBIN: 10 G/DL (ref 13.5–17.5)
IRON SATURATION: 18 % (ref 20–50)
IRON: 46 UG/DL (ref 59–158)
LACTIC ACID: 1.6 MMOL/L (ref 0.4–2)
LYMPHOCYTES ABSOLUTE: 1.1 K/UL (ref 1–5.1)
LYMPHOCYTES RELATIVE PERCENT: 9 %
MAGNESIUM: 1.9 MG/DL (ref 1.8–2.4)
MAGNESIUM: 2 MG/DL (ref 1.8–2.4)
MCH RBC QN AUTO: 28.6 PG (ref 26–34)
MCHC RBC AUTO-ENTMCNC: 32.4 G/DL (ref 31–36)
MCV RBC AUTO: 88.3 FL (ref 80–100)
METHEMOGLOBIN VENOUS: 0.3 %
MONOCYTES ABSOLUTE: 0.7 K/UL (ref 0–1.3)
MONOCYTES RELATIVE PERCENT: 5.6 %
NEUTROPHILS ABSOLUTE: 9.6 K/UL (ref 1.7–7.7)
NEUTROPHILS RELATIVE PERCENT: 81.4 %
O2 SAT, VEN: 92 %
O2 THERAPY: ABNORMAL
PCO2, VEN: 38.9 MMHG (ref 40–50)
PDW BLD-RTO: 16 % (ref 12.4–15.4)
PERFORMED ON: ABNORMAL
PH VENOUS: 7.4 (ref 7.35–7.45)
PLATELET # BLD: 355 K/UL (ref 135–450)
PMV BLD AUTO: 7.9 FL (ref 5–10.5)
PO2, VEN: 65.2 MMHG (ref 25–40)
POTASSIUM SERPL-SCNC: 4.4 MMOL/L (ref 3.5–5.1)
PRO-BNP: ABNORMAL PG/ML (ref 0–124)
RBC # BLD: 3.48 M/UL (ref 4.2–5.9)
SARS-COV-2, NAAT: NOT DETECTED
SODIUM BLD-SCNC: 133 MMOL/L (ref 136–145)
TCO2 CALC VENOUS: 25 MMOL/L
TOTAL IRON BINDING CAPACITY: 257 UG/DL (ref 260–445)
TOTAL PROTEIN: 7.2 G/DL (ref 6.4–8.2)
TROPONIN: 0.25 NG/ML
TROPONIN: 0.39 NG/ML
WBC # BLD: 11.8 K/UL (ref 4–11)

## 2022-01-12 PROCEDURE — 82803 BLOOD GASES ANY COMBINATION: CPT

## 2022-01-12 PROCEDURE — 6370000000 HC RX 637 (ALT 250 FOR IP): Performed by: NURSE PRACTITIONER

## 2022-01-12 PROCEDURE — 6360000002 HC RX W HCPCS: Performed by: INTERNAL MEDICINE

## 2022-01-12 PROCEDURE — 99222 1ST HOSP IP/OBS MODERATE 55: CPT | Performed by: INTERNAL MEDICINE

## 2022-01-12 PROCEDURE — 83540 ASSAY OF IRON: CPT

## 2022-01-12 PROCEDURE — 71045 X-RAY EXAM CHEST 1 VIEW: CPT

## 2022-01-12 PROCEDURE — 99284 EMERGENCY DEPT VISIT MOD MDM: CPT

## 2022-01-12 PROCEDURE — 83735 ASSAY OF MAGNESIUM: CPT

## 2022-01-12 PROCEDURE — 6370000000 HC RX 637 (ALT 250 FOR IP): Performed by: EMERGENCY MEDICINE

## 2022-01-12 PROCEDURE — 6360000002 HC RX W HCPCS: Performed by: EMERGENCY MEDICINE

## 2022-01-12 PROCEDURE — 93010 ELECTROCARDIOGRAM REPORT: CPT | Performed by: INTERNAL MEDICINE

## 2022-01-12 PROCEDURE — 87635 SARS-COV-2 COVID-19 AMP PRB: CPT

## 2022-01-12 PROCEDURE — 96374 THER/PROPH/DIAG INJ IV PUSH: CPT

## 2022-01-12 PROCEDURE — 85520 HEPARIN ASSAY: CPT

## 2022-01-12 PROCEDURE — 85025 COMPLETE CBC W/AUTO DIFF WBC: CPT

## 2022-01-12 PROCEDURE — 82728 ASSAY OF FERRITIN: CPT

## 2022-01-12 PROCEDURE — 36415 COLL VENOUS BLD VENIPUNCTURE: CPT

## 2022-01-12 PROCEDURE — 6370000000 HC RX 637 (ALT 250 FOR IP): Performed by: INTERNAL MEDICINE

## 2022-01-12 PROCEDURE — 80053 COMPREHEN METABOLIC PANEL: CPT

## 2022-01-12 PROCEDURE — 2700000000 HC OXYGEN THERAPY PER DAY

## 2022-01-12 PROCEDURE — 94660 CPAP INITIATION&MGMT: CPT

## 2022-01-12 PROCEDURE — 2500000003 HC RX 250 WO HCPCS: Performed by: INTERNAL MEDICINE

## 2022-01-12 PROCEDURE — 83550 IRON BINDING TEST: CPT

## 2022-01-12 PROCEDURE — 90935 HEMODIALYSIS ONE EVALUATION: CPT

## 2022-01-12 PROCEDURE — 93005 ELECTROCARDIOGRAM TRACING: CPT | Performed by: EMERGENCY MEDICINE

## 2022-01-12 PROCEDURE — C8923 2D TTE W OR W/O FOL W/CON,CO: HCPCS

## 2022-01-12 PROCEDURE — 84484 ASSAY OF TROPONIN QUANT: CPT

## 2022-01-12 PROCEDURE — 94761 N-INVAS EAR/PLS OXIMETRY MLT: CPT

## 2022-01-12 PROCEDURE — 83605 ASSAY OF LACTIC ACID: CPT

## 2022-01-12 PROCEDURE — 94640 AIRWAY INHALATION TREATMENT: CPT

## 2022-01-12 PROCEDURE — 83880 ASSAY OF NATRIURETIC PEPTIDE: CPT

## 2022-01-12 PROCEDURE — 2060000000 HC ICU INTERMEDIATE R&B

## 2022-01-12 PROCEDURE — 2580000003 HC RX 258: Performed by: INTERNAL MEDICINE

## 2022-01-12 PROCEDURE — 83036 HEMOGLOBIN GLYCOSYLATED A1C: CPT

## 2022-01-12 RX ORDER — FUROSEMIDE 10 MG/ML
40 INJECTION INTRAMUSCULAR; INTRAVENOUS ONCE
Status: COMPLETED | OUTPATIENT
Start: 2022-01-12 | End: 2022-01-12

## 2022-01-12 RX ORDER — TRAZODONE HYDROCHLORIDE 50 MG/1
150 TABLET ORAL NIGHTLY PRN
Status: DISCONTINUED | OUTPATIENT
Start: 2022-01-12 | End: 2022-01-17 | Stop reason: HOSPADM

## 2022-01-12 RX ORDER — LISINOPRIL 5 MG/1
5 TABLET ORAL DAILY
Status: DISCONTINUED | OUTPATIENT
Start: 2022-01-12 | End: 2022-01-17 | Stop reason: HOSPADM

## 2022-01-12 RX ORDER — NICOTINE POLACRILEX 4 MG
15 LOZENGE BUCCAL PRN
Status: DISCONTINUED | OUTPATIENT
Start: 2022-01-12 | End: 2022-01-17 | Stop reason: HOSPADM

## 2022-01-12 RX ORDER — HEPARIN SODIUM 1000 [USP'U]/ML
4000 INJECTION, SOLUTION INTRAVENOUS; SUBCUTANEOUS ONCE
Status: COMPLETED | OUTPATIENT
Start: 2022-01-12 | End: 2022-01-12

## 2022-01-12 RX ORDER — DEXTROSE MONOHYDRATE 50 MG/ML
100 INJECTION, SOLUTION INTRAVENOUS PRN
Status: DISCONTINUED | OUTPATIENT
Start: 2022-01-12 | End: 2022-01-17 | Stop reason: HOSPADM

## 2022-01-12 RX ORDER — METOPROLOL SUCCINATE 50 MG/1
50 TABLET, EXTENDED RELEASE ORAL DAILY
Status: DISCONTINUED | OUTPATIENT
Start: 2022-01-13 | End: 2022-01-17 | Stop reason: HOSPADM

## 2022-01-12 RX ORDER — INSULIN GLARGINE 100 [IU]/ML
25 INJECTION, SOLUTION SUBCUTANEOUS NIGHTLY
Status: DISCONTINUED | OUTPATIENT
Start: 2022-01-12 | End: 2022-01-17 | Stop reason: HOSPADM

## 2022-01-12 RX ORDER — ALBUTEROL SULFATE 90 UG/1
2 AEROSOL, METERED RESPIRATORY (INHALATION) EVERY 6 HOURS PRN
Status: DISCONTINUED | OUTPATIENT
Start: 2022-01-12 | End: 2022-01-17 | Stop reason: HOSPADM

## 2022-01-12 RX ORDER — CLOPIDOGREL BISULFATE 75 MG/1
75 TABLET ORAL DAILY
Status: DISCONTINUED | OUTPATIENT
Start: 2022-01-12 | End: 2022-01-17 | Stop reason: HOSPADM

## 2022-01-12 RX ORDER — ASPIRIN 81 MG/1
162 TABLET ORAL ONCE
Status: DISCONTINUED | OUTPATIENT
Start: 2022-01-12 | End: 2022-01-17 | Stop reason: HOSPADM

## 2022-01-12 RX ORDER — PANTOPRAZOLE SODIUM 40 MG/10ML
40 INJECTION, POWDER, LYOPHILIZED, FOR SOLUTION INTRAVENOUS ONCE
Status: CANCELLED | OUTPATIENT
Start: 2022-01-12

## 2022-01-12 RX ORDER — QUETIAPINE FUMARATE 25 MG/1
50 TABLET, FILM COATED ORAL NIGHTLY
Status: DISCONTINUED | OUTPATIENT
Start: 2022-01-12 | End: 2022-01-17 | Stop reason: HOSPADM

## 2022-01-12 RX ORDER — HEPARIN SODIUM 1000 [USP'U]/ML
4000 INJECTION, SOLUTION INTRAVENOUS; SUBCUTANEOUS ONCE
Status: DISCONTINUED | OUTPATIENT
Start: 2022-01-12 | End: 2022-01-12

## 2022-01-12 RX ORDER — HEPARIN SODIUM 10000 [USP'U]/100ML
1370 INJECTION, SOLUTION INTRAVENOUS CONTINUOUS
Status: DISCONTINUED | OUTPATIENT
Start: 2022-01-12 | End: 2022-01-12

## 2022-01-12 RX ORDER — FAMOTIDINE 20 MG/1
20 TABLET, FILM COATED ORAL
Status: DISCONTINUED | OUTPATIENT
Start: 2022-01-12 | End: 2022-01-17 | Stop reason: HOSPADM

## 2022-01-12 RX ORDER — IPRATROPIUM BROMIDE AND ALBUTEROL SULFATE 2.5; .5 MG/3ML; MG/3ML
1 SOLUTION RESPIRATORY (INHALATION) ONCE
Status: COMPLETED | OUTPATIENT
Start: 2022-01-12 | End: 2022-01-12

## 2022-01-12 RX ORDER — METOPROLOL SUCCINATE 25 MG/1
25 TABLET, EXTENDED RELEASE ORAL DAILY
Status: DISCONTINUED | OUTPATIENT
Start: 2022-01-12 | End: 2022-01-12

## 2022-01-12 RX ORDER — LORAZEPAM 2 MG/ML
INJECTION INTRAMUSCULAR
Status: DISPENSED
Start: 2022-01-12 | End: 2022-01-12

## 2022-01-12 RX ORDER — DEXTROSE MONOHYDRATE 25 G/50ML
12.5 INJECTION, SOLUTION INTRAVENOUS PRN
Status: DISCONTINUED | OUTPATIENT
Start: 2022-01-12 | End: 2022-01-17 | Stop reason: HOSPADM

## 2022-01-12 RX ORDER — HEPARIN SODIUM 10000 [USP'U]/100ML
2060 INJECTION, SOLUTION INTRAVENOUS CONTINUOUS
Status: DISCONTINUED | OUTPATIENT
Start: 2022-01-12 | End: 2022-01-17 | Stop reason: HOSPADM

## 2022-01-12 RX ORDER — HEPARIN SODIUM 1000 [USP'U]/ML
2000 INJECTION, SOLUTION INTRAVENOUS; SUBCUTANEOUS PRN
Status: DISCONTINUED | OUTPATIENT
Start: 2022-01-12 | End: 2022-01-17 | Stop reason: HOSPADM

## 2022-01-12 RX ORDER — OXYCODONE HYDROCHLORIDE AND ACETAMINOPHEN 5; 325 MG/1; MG/1
1 TABLET ORAL EVERY 4 HOURS PRN
Status: COMPLETED | OUTPATIENT
Start: 2022-01-12 | End: 2022-01-14

## 2022-01-12 RX ORDER — PRAVASTATIN SODIUM 40 MG
40 TABLET ORAL NIGHTLY
Status: DISCONTINUED | OUTPATIENT
Start: 2022-01-12 | End: 2022-01-17 | Stop reason: HOSPADM

## 2022-01-12 RX ORDER — HEPARIN SODIUM 1000 [USP'U]/ML
4000 INJECTION, SOLUTION INTRAVENOUS; SUBCUTANEOUS PRN
Status: DISCONTINUED | OUTPATIENT
Start: 2022-01-12 | End: 2022-01-17 | Stop reason: HOSPADM

## 2022-01-12 RX ORDER — ALBUTEROL SULFATE 2.5 MG/3ML
2.5 SOLUTION RESPIRATORY (INHALATION) EVERY 4 HOURS PRN
Status: DISCONTINUED | OUTPATIENT
Start: 2022-01-12 | End: 2022-01-17 | Stop reason: HOSPADM

## 2022-01-12 RX ORDER — OXYCODONE HYDROCHLORIDE AND ACETAMINOPHEN 5; 325 MG/1; MG/1
1 TABLET ORAL EVERY 4 HOURS PRN
Status: COMPLETED | OUTPATIENT
Start: 2022-01-12 | End: 2022-01-12

## 2022-01-12 RX ORDER — LABETALOL HYDROCHLORIDE 5 MG/ML
10 INJECTION, SOLUTION INTRAVENOUS EVERY 4 HOURS PRN
Status: DISCONTINUED | OUTPATIENT
Start: 2022-01-12 | End: 2022-01-17 | Stop reason: HOSPADM

## 2022-01-12 RX ORDER — DULOXETIN HYDROCHLORIDE 60 MG/1
60 CAPSULE, DELAYED RELEASE ORAL DAILY
Status: DISCONTINUED | OUTPATIENT
Start: 2022-01-12 | End: 2022-01-17 | Stop reason: HOSPADM

## 2022-01-12 RX ORDER — HEPARIN SODIUM 1000 [USP'U]/ML
2000 INJECTION, SOLUTION INTRAVENOUS; SUBCUTANEOUS ONCE
Status: COMPLETED | OUTPATIENT
Start: 2022-01-12 | End: 2022-01-12

## 2022-01-12 RX ORDER — PANTOPRAZOLE SODIUM 40 MG/1
40 TABLET, DELAYED RELEASE ORAL
Status: DISCONTINUED | OUTPATIENT
Start: 2022-01-12 | End: 2022-01-17 | Stop reason: HOSPADM

## 2022-01-12 RX ORDER — HEPARIN SODIUM 1000 [USP'U]/ML
4700 INJECTION, SOLUTION INTRAVENOUS; SUBCUTANEOUS PRN
Status: DISCONTINUED | OUTPATIENT
Start: 2022-01-12 | End: 2022-01-17 | Stop reason: HOSPADM

## 2022-01-12 RX ADMIN — INSULIN LISPRO 4 UNITS: 100 INJECTION, SOLUTION INTRAVENOUS; SUBCUTANEOUS at 20:57

## 2022-01-12 RX ADMIN — FUROSEMIDE 40 MG: 10 INJECTION, SOLUTION INTRAMUSCULAR; INTRAVENOUS at 01:34

## 2022-01-12 RX ADMIN — HYDROMORPHONE HYDROCHLORIDE 1 MG: 1 INJECTION, SOLUTION INTRAMUSCULAR; INTRAVENOUS; SUBCUTANEOUS at 03:50

## 2022-01-12 RX ADMIN — NITROGLYCERIN 1 INCH: 20 OINTMENT TOPICAL at 01:35

## 2022-01-12 RX ADMIN — HEPARIN SODIUM 2000 UNITS: 1000 INJECTION INTRAVENOUS; SUBCUTANEOUS at 20:57

## 2022-01-12 RX ADMIN — METOPROLOL SUCCINATE 25 MG: 25 TABLET, EXTENDED RELEASE ORAL at 15:25

## 2022-01-12 RX ADMIN — OXYCODONE AND ACETAMINOPHEN 1 TABLET: 5; 325 TABLET ORAL at 11:46

## 2022-01-12 RX ADMIN — HYDROMORPHONE HYDROCHLORIDE 0.5 MG: 1 INJECTION, SOLUTION INTRAMUSCULAR; INTRAVENOUS; SUBCUTANEOUS at 01:55

## 2022-01-12 RX ADMIN — QUETIAPINE FUMARATE 50 MG: 25 TABLET ORAL at 20:57

## 2022-01-12 RX ADMIN — INSULIN LISPRO 4 UNITS: 100 INJECTION, SOLUTION INTRAVENOUS; SUBCUTANEOUS at 08:11

## 2022-01-12 RX ADMIN — Medication 1370 UNITS/HR: at 06:03

## 2022-01-12 RX ADMIN — PRAVASTATIN SODIUM 40 MG: 40 TABLET ORAL at 20:57

## 2022-01-12 RX ADMIN — FAMOTIDINE 20 MG: 20 TABLET, FILM COATED ORAL at 11:46

## 2022-01-12 RX ADMIN — HEPARIN SODIUM 4000 UNITS: 1000 INJECTION INTRAVENOUS; SUBCUTANEOUS at 06:00

## 2022-01-12 RX ADMIN — IPRATROPIUM BROMIDE AND ALBUTEROL SULFATE 1 AMPULE: .5; 2.5 SOLUTION RESPIRATORY (INHALATION) at 01:05

## 2022-01-12 RX ADMIN — Medication 1370 UNITS/HR: at 23:46

## 2022-01-12 RX ADMIN — INSULIN GLARGINE 25 UNITS: 100 INJECTION, SOLUTION SUBCUTANEOUS at 22:59

## 2022-01-12 RX ADMIN — HEPARIN SODIUM 4700 UNITS: 1000 INJECTION INTRAVENOUS; SUBCUTANEOUS at 12:51

## 2022-01-12 RX ADMIN — DULOXETINE HYDROCHLORIDE 60 MG: 60 CAPSULE, DELAYED RELEASE ORAL at 15:24

## 2022-01-12 RX ADMIN — PANTOPRAZOLE SODIUM 40 MG: 40 TABLET, DELAYED RELEASE ORAL at 08:11

## 2022-01-12 RX ADMIN — OXYCODONE AND ACETAMINOPHEN 1 TABLET: 5; 325 TABLET ORAL at 21:06

## 2022-01-12 RX ADMIN — LABETALOL HYDROCHLORIDE 10 MG: 5 INJECTION INTRAVENOUS at 04:44

## 2022-01-12 RX ADMIN — CLOPIDOGREL 75 MG: 75 TABLET, FILM COATED ORAL at 08:11

## 2022-01-12 RX ADMIN — TRAZODONE HYDROCHLORIDE 150 MG: 50 TABLET ORAL at 20:57

## 2022-01-12 RX ADMIN — OXYCODONE AND ACETAMINOPHEN 1 TABLET: 5; 325 TABLET ORAL at 04:44

## 2022-01-12 RX ADMIN — OXYCODONE AND ACETAMINOPHEN 1 TABLET: 5; 325 TABLET ORAL at 16:20

## 2022-01-12 ASSESSMENT — PAIN DESCRIPTION - ORIENTATION
ORIENTATION: LOWER
ORIENTATION: LOWER
ORIENTATION_2: RIGHT
ORIENTATION_2: RIGHT
ORIENTATION: RIGHT;LEFT
ORIENTATION: LOWER
ORIENTATION: LOWER

## 2022-01-12 ASSESSMENT — PAIN DESCRIPTION - LOCATION
LOCATION_2: NECK
LOCATION: CHEST
LOCATION_2: NECK
LOCATION: BACK

## 2022-01-12 ASSESSMENT — PAIN DESCRIPTION - PAIN TYPE
TYPE: ACUTE PAIN
TYPE_2: ACUTE PAIN
TYPE_2: ACUTE PAIN
TYPE: ACUTE PAIN;CHRONIC PAIN
TYPE: CHRONIC PAIN

## 2022-01-12 ASSESSMENT — ENCOUNTER SYMPTOMS
CHEST TIGHTNESS: 1
BACK PAIN: 1
ABDOMINAL PAIN: 0
VOMITING: 0
SHORTNESS OF BREATH: 1
EYE PAIN: 0
DIARRHEA: 0

## 2022-01-12 ASSESSMENT — PAIN SCALES - GENERAL
PAINLEVEL_OUTOF10: 9
PAINLEVEL_OUTOF10: 8
PAINLEVEL_OUTOF10: 9
PAINLEVEL_OUTOF10: 10
PAINLEVEL_OUTOF10: 0
PAINLEVEL_OUTOF10: 5
PAINLEVEL_OUTOF10: 8
PAINLEVEL_OUTOF10: 6
PAINLEVEL_OUTOF10: 10
PAINLEVEL_OUTOF10: 5
PAINLEVEL_OUTOF10: 5
PAINLEVEL_OUTOF10: 9

## 2022-01-12 ASSESSMENT — PAIN DESCRIPTION - ONSET
ONSET_2: ON-GOING
ONSET: ON-GOING

## 2022-01-12 ASSESSMENT — PAIN DESCRIPTION - PROGRESSION
CLINICAL_PROGRESSION: NOT CHANGED
CLINICAL_PROGRESSION: NOT CHANGED

## 2022-01-12 ASSESSMENT — PAIN DESCRIPTION - DESCRIPTORS
DESCRIPTORS: SORE
DESCRIPTORS: ACHING;SHARP
DESCRIPTORS: ACHING;SHARP
DESCRIPTORS_2: ACHING;SHARP;SORE
DESCRIPTORS_2: ACHING;SORE;SHARP

## 2022-01-12 ASSESSMENT — PAIN DESCRIPTION - FREQUENCY
FREQUENCY: CONTINUOUS
FREQUENCY: INTERMITTENT
FREQUENCY: CONTINUOUS

## 2022-01-12 ASSESSMENT — PAIN DESCRIPTION - INTENSITY
RATING_2: 8
RATING_2: 9

## 2022-01-12 ASSESSMENT — PAIN DESCRIPTION - DURATION: DURATION_2: CONTINUOUS

## 2022-01-12 NOTE — ED NOTES
Bed: 25  Expected date:   Expected time:   Means of arrival:   Comments:  43 Donny Charles RN  01/12/22 8209

## 2022-01-12 NOTE — CARE COORDINATION
CASE MANAGEMENT INITIAL ASSESSMENT      Reviewed chart and completed assessment with patient:  Explained Case Management role/services. To patient    Primary contact information:see below    Health Care Decision Maker :   Primary Decision Maker: Crystal Ann - Spouse - [phone]          Can this person be reached and be able to respond quickly, such as within a few minutes or hours? Yes    Admit date/status:inpatient 1/12/2022  Diagnosis:Flash pulmonary edema  Is this a Readmission?:  No      Insurance:[facility] required for SNF: No  Waived due to pandemic    3 night stay required: Yes    Living arrangements, Adls, care needs, prior to admission:Lives at home with spouse, uses a rollator, has Electric scooter, has ramped entrance    Transportation:tbd    Durable Medical Equipment at home:  Walker_x_Cane__RTS__ BSC__Shower Chair__  02__ HHN__ CPAP__  BiPap__  Hospital Bed__ W/C___ Other_______x rollator and electric scooter___    Services in the home and/or outpatient, prior to [address] [place] [address] (if applicable)   · Keily Mcfadden  · Address: [place] [address]  · Dialysis Schedule:[facility] 1[facility]    PT/OT recs:Not seen yet this admission    Hospital Exemption Notification (HEN):N/A    Barriers to discharge:None identified    Plan/comments: To return home with spouse. Patient states he drives himself to HD. He thinks he will need O2 at d/c and want to be tested to see if he will qualifies. Currently on 4 liters. Cardiology consulted. On heparin gtt, echocardiogram limited. NSTEMI. Will follow.     ECOC on chart for MD signature

## 2022-01-12 NOTE — ED NOTES
Bed: 08  Expected date:   Expected time:   Means of arrival:   Comments:  Karyna Nguyễn RN  01/12/22 0040

## 2022-01-12 NOTE — ACP (ADVANCE CARE PLANNING)
Advance Care Planning     Advance Care Planning Activator (Inpatient)  Conversation Note      Date of ACP Conversation: 1/12/2022     Conversation Conducted with: Patient    ACP Activator: Ronal Krueger RN        Health Care Decision Maker:     Current Designated Health Care Decision Maker:     Primary Decision Maker: MarissaCrystal - [facility] - [phone]      Care Preferences    Ventilation: \"If you were in your present state of health and suddenly became very ill and were unable to breathe on your own, what would your preference be about the use of a ventilator (breathing machine) if it were available to you? \"      Would the patient desire the use of ventilator (breathing machine)?: yes    \"If your health worsens and it becomes clear that your chance of recovery is unlikely, what would your preference be about the use of a ventilator (breathing machine) if it were available to you? \"     Would the patient desire the use of ventilator (breathing machine)?: No      Resuscitation  \"CPR works best to restart the heart when there is a sudden event, like a heart attack, in someone who is otherwise healthy. Unfortunately, CPR does not typically restart the heart for people who have serious health conditions or who are very sick. \"    \"In the event your heart stopped as a result of an underlying serious health condition, would you want attempts to be made to restart your heart (answer \"yes\" for attempt to resuscitate) or would you prefer a natural death (answer \"no\" for do not attempt to resuscitate)? \" yes

## 2022-01-12 NOTE — CONSULTS
[id number]    Elev trop  SOB  HTN emergency  Ischemic cardiomyopathy, EF 40-50%  CAD, stent. Cath 2019 stable w/o PCI  DM  RADHA, <50%  Peripheral stents   TAVR  ESRD/HD  HLD  Anemia  COPD  Smoker    Echo  Fluid removal w/ HD  Increase BBlk  Add ACE  Repeat ischemic eval. Resp status prohibitive at this time.  Will plan w/ lexiscan myoview in am.

## 2022-01-12 NOTE — H&P
Hospital Medicine History & Physical      PCP: Noemí Hernandes MD    Date of Admission: 1/12/2022    Date of Service: Pt seen/examined on 1/12/2022    Pt seen/examined face to face on and admitted as inpatient with expected LOS greater than two midnights due to medical therapy    Chief Complaint:    Chief Complaint   Patient presents with    Shortness of Breath     Pt called EMS d/t SOB that started around 6-7pm. Pt COPD, asthma, CHF. Tried inhalers and nebs at home without relief. History Of Present Illness:      46 y.o. male who presented to [facility] with past medical history of type 2 diabetes, incisional disease on dialysis Monday Wednesday Friday, GERD, hyperlipidemia, type 2 diabetes, ZAINAB on CPAP, HFrEF presented to the ED for worsening chest pain and shortness of breath. Patient reported that he has chronic shortness of breath however has gotten significantly worse the past few days especially today around 6 PM.  Patient reportedly tried inhalers and nebs at home without any relief, worsened on laying flat, no alleviating factor. Patient reported that he continues to not eat salt, no increase fluid intake, no worsening lower extremity edema. Patient also reports that he has not missed any dialysis. Patient reports he does have been having chest pressure that is much worse when he lays flat. He reported does not change with exertion although the patient is limited and uses a Rollator to ambulate. Otherwise able to dress herself and it himself. Patient also reports he has been compliant with all his medication. Patient also reports that he does have sleep apnea and uses CPAP at night. Patient otherwise reports that he has been avoiding any contacts and has been coughing green material otherwise no COVID exposures. Discussed with patient CVC risks, complications and patient was agreeable to proceed with procedure. Verbal consent was given to MD, midlevel and RN.         Past Medical History:          Diagnosis Date    Ambulatory dysfunction     walker for long distances, SOB with distance    Aortic stenosis     echo 2017    Arthritis     hands and hips    Asthma     Bilateral hilar adenopathy syndrome 6/3/2013    CAD (coronary artery disease)     Dr. Karen [facility]) 04/19/2019    EF= 43%    CHF (congestive heart failure) (HCC)     Chronic pain     COPD (chronic obstructive pulmonary disease) ([address]     pulmonology Dr. Thompson Favorite    Depression     Diabetes mellitus ([address]     borderline    Difficult intravenous access     Emphysema of lung ([address]     ESRD (end stage renal disease) on dialysis ([address]     MWF    Fear of needles     Gastric ulcer     GERD (gastroesophageal reflux disease)     Heart valve problem     bicuspic valve    Hemodialysis patient ([address]     History of spinal fracture     work incident    Hx of blood clots     Bilateral lower extremities; stents in place    Hyperlipidemia     Hypertension     MI (myocardial infarction) ([address] 2019    has had 9 MIs. 2019 was the last    Neuromuscular disorder ([address]     due to CVA    Numbness and tingling in left arm     from fistula    Pneumonia     PONV (postoperative nausea and vomiting)     Prolonged emergence from general anesthesia     States requires more medication than most people    Sleep apnea     Uses CPAP    Stroke ([address]     7mm thalamic cva 2017 deficts left side, left side weakness    TIA (transient ischemic attack)     Unspecified diseases of blood and blood-forming organs        Past Surgical History:          Procedure Laterality Date    AORTIC VALVE REPLACEMENT N/A 10/15/2019    TRANSCATHETER AORTIC VALVE REPLACEMENT FEMORAL APPROACH performed by Russel Frances MD at [address] 7/2/2019    PERITONEAL DIALYSIS CATHETER REMOVAL performed by Angel Valencia MD at [address] [place] [address]  2/29/2015    WNL    CORONARY MD   albuterol (PROVENTIL) (2.5 MG/3ML) 0.083% nebulizer solution INHALE 1 VIAL VIA NEBULIZER EVERY 6 HOURS AS NEEDED FOR WHEEZING 11/29/21   Tamar Lopez MD   clopidogrel (PLAVIX) 75 MG tablet TAKE 1 TABLET BY MOUTH ONCE DAILY 11/16/21   Gerber Patrick MD    MG capsule TAKE 1 CAPSULE BY MOUTH TWICE DAILY 11/12/21   JAY Hodge CNP   cyclobenzaprine (FLEXERIL) 10 MG tablet TAKE (1) TABLET BY MOUTH EVERY 8 HOURS AS NEEDED 10/26/21   JAY Hodge CNP   QUEtiapine (SEROQUEL) 50 MG tablet TAKE 1 TABLET BY MOUTH IN THE EVENING 10/18/21   Tamar Lopez MD   thiamine (B-1) 100 MG/ML injection Infuse 1 mL intravenously daily 10/15/21   JAY García CNP   Multiple Vitamins-Minerals (THERAPEUTIC MULTIVITAMIN-MINERALS) tablet Take 1 tablet by mouth daily 10/15/21   JAY García CNP   Continuous Blood Gluc Sensor (DEXCOM G6 SENSOR) MISC Every 10 days 10/5/21   Tamar Lopez MD   Continuous Blood Gluc Transmit (DEXCOM G6 TRANSMITTER) MISC 1 each by Does not apply route every 3 months 10/5/21   Tamar Lopez MD   Continuous Blood Gluc  ([address]) [address] 1 each by Does not apply route Daily with lunch 10/5/21   Tamar Lopez MD   DULoxetine (CYMBALTA) 60 MG extended release capsule TAKE 1 CAPSULE BY MOUTH EVERY DAY 10/5/21   Tamar Lopez MD   carvedilol (COREG) 12.5 MG tablet TAKE 1 TABLET BY MOUTH TWICE DAILY WITH MEALS 9/29/21   Tamar Lopez MD   traZODone (DESYREL) 150 MG tablet TAKE ONE (1) TABLET BY MOUTH NIGHTLY 9/29/21   Tamar Lopez MD   pravastatin (PRAVACHOL) 40 MG tablet TAKE 1 TABLET BY MOUTH EACH EVENING 9/29/21   Tamar Lopez MD   B Complex-C-Folic Acid (VIRT-CAPS) 1 MG CAPS TAKE 1 CAPSULE BY MOUTH EVERY DAY 9/20/21   Tamar Lopez MD   torsemide (DEMADEX) 100 MG tablet Take 1 tablet by mouth daily 9/4/21   Celia Patel MD   Calcium Acetate, Phos Binder, 667 MG CAPS TAKE 1 CAPSULE BY MOUTH THREE TIMES DAILY WITH MEALS 8/12/21   Carli Palacio MD   BASAGLAR KWIKPEN 100 UNIT/ML injection pen ADMINISTER 60 UNITS UNDER THE SKIN EVERY NIGHT 7/29/21   Carli Palacio MD   famotidine (PEPCID) 20 MG tablet TAKE 1 TABLET BY MOUTH TWICE DAILY AS NEEDED FOR HEARTBURN 5/28/21   Carli Palacio MD   LINZESS 145 MCG capsule TAKE 1 CAPSULE BY MOUTH EVERY MORNING BEFORE BREAKFAST 5/25/21   Carli Palacio MD   glucose monitoring kit (FREESTYLE) monitoring kit 1 kit by Does not apply route daily  Patient not taking: Reported on 10/5/2021 1/8/21   Carli Palacio MD   blood glucose test strips (GLUCOSE METER TEST) strip 1 each by In Vitro route 5 times daily As needed. 1/8/21   Carli Palacio MD   nitroGLYCERIN (NITROSTAT) 0.4 MG SL tablet DISSOLVE 1 TABLET UNDER THE TONGUE AS NEEDED FOR CHEST PAIN EVERY 5 MINUTES UP TO 3 TIMES. IF NO RELIEF CALL 911. 1/7/21   Carli Palacio MD   insulin aspart (NOVOLOG FLEXPEN) 100 UNIT/ML injection pen Inject 20 Units into the skin 3 times daily (before meals) 10/12/20   Carli Palacio MD   blood glucose test strips (FREESTYLE LITE) strip Daily As needed.  8/19/19   Carli Palacio MD   glucose monitoring kit (FREESTYLE) monitoring kit 1 kit by Does not apply route daily  Patient not taking: Reported on 10/5/2021 8/19/19   Carli Palacio MD   vitamin D (ERGOCALCIFEROL) 25238 units CAPS capsule TK 1 C PO WEEKLY 6/2/19   Historical Provider, MD   Tiotropium Bromide-Olodaterol (STIOLTO RESPIMAT) 2.5-2.5 MCG/ACT AERS Inhale 2 puffs into the lungs daily 5/21/19   Samina Gong MD   Polyethylene Glycol [address]  5/2/18   Historical Provider, MD   Glucose Blood (BLOOD GLUCOSE TEST STRIPS) STRP TEST 3-4 TIMES DAILY, AS DIRECTED  Patient not taking: Reported on 10/5/2021 4/25/18   Cassie Kussmaul, MD   Blood Glucose Monitoring Suppl ADAM USE AS DIRECTED. 4/25/18   Cassie Kussmaul, MD   Alcohol Swabs PADS USE AS DIRECTED 4/25/18   Cassie Kussmaul, MD   albuterol sulfate  (90 Base) MCG/ACT inhaler Inhale 2 puffs into the lungs every 6 hours as needed for Wheezing 11/8/17   Lorenza Saul MD   ipratropium-albuterol (DUONEB) 0.5-2.5 (3) MG/3ML SOLN nebulizer solution Inhale 3 mLs into the lungs every 6 hours as needed for Shortness of Breath 10/15/17   Peace Teague MD   calcium carbonate (TUMS) 500 MG chewable tablet Take 1 tablet by mouth 3 times daily as needed for Heartburn. Historical Provider, MD       Allergies:  Morphine    Social History:          TOBACCO:   reports that he quit smoking about 20 months ago. His smoking use included cigarettes. He has a 16.50 pack-year smoking history. He has never used smokeless tobacco.  ETOH:   reports previous alcohol use. E-Cigarettes/Vaping Use     Questions Responses    E-Cigarette/Vaping Use Never User    Start Date     Passive Exposure     Quit Date     Counseling Given     Comments             Family History:      Reviewed in detail, and noncontributory        Problem Relation Age of Onset    Diabetes Mother     Heart Disease Father     Kidney Disease Sister         stage 4-kidney failure    Cancer Sister     Heart Disease Sister     Obesity Sister     Cancer Sister     Heart Disease Sister     Obesity Sister     Alcohol Abuse Brother        REVIEW OF SYSTEMS:     Constitutional:  No Fever, No Chills, No Night Sweats  ENT/Mouth:  No Nasal Congestion,  No Hoarseness, No new mouth lesion  Eyes:  No Eye Pain, No Redness, No Discharge  Cardiovascular: + Chest Pain, No Orthopnea, No Palpitations  Respiratory: + O Cough, ++ sputum, + Dyspnea  Gastrointestinal: No Vomiting, No Diarrhea, No abdominal pain  Genitourinary: No Urinary Frequency, No Hematuria, No Urinary pain  Musculoskeletal:  No worsening Arthralgias, No worsening Myalgias  Skin:  No new Skin Lesions, No new skin rash  Neuro:  No new weakness, No new numbness.   Psych:  No suicial ideation, No Violence ideation    PHYSICAL EXAM PERFORMED:    BP (!) 203/106   Pulse 103   Temp 98.7 °F (37.1 °C) (Oral) Resp 15   Ht 5' 9\" (1.753 m)   Wt 252 lb (114.3 kg)   SpO2 100%   BMI 37.21 kg/m²     General appearance:  mild acute distress, appears older than stated age  [de-identified]:   atraumatic, sclera anicteric, Conjunctivae clear. Neck: Supple,Trachea midline, no goiter  Respiratory:minimal accessory muscle usage, Normal respiratory effort. Clear to auscultation, bilaterally without wheezing  Cardiovascular:  Regular rate and rhythm, capillary refill 2 seconds  Abdomen: Soft, non-tender, non-distended with normal bowel sounds. Musculoskeletal:  No clubbing, cyanosis. 1+ edema LE bilaterally. Skin: turgor normal.  No new rashes or lesions. Neurologic: Alert and oriented x4, no new focal sensory/motor deficits. Labs:     Recent Labs     01/12/22 0030   WBC 11.8*   HGB 10.0*   HCT 30.8*        Recent Labs     01/12/22 0030   *   K 4.4   CL 91*   CO2 22   BUN 74*   CREATININE 6.6*   CALCIUM 9.2     Recent Labs     01/12/22 0030   AST 15   ALT 11   BILITOT <0.2   ALKPHOS 122     No results for input(s): INR in the last 72 hours. Recent Labs     01/12/22 0030   TROPONINI 0.25*       Urinalysis:      Lab Results   Component Value Date    NITRU Negative 09/07/2021    WBCUA 10-20 09/07/2021    BACTERIA 1+ 09/07/2021    RBCUA 3-4 09/07/2021    BLOODU TRACE-LYSED 09/07/2021    SPECGRAV 1.015 09/07/2021    GLUCOSEU 100 09/07/2021       Radiology:     CXR: I have reviewed the CXR with the following interpretation:   No acute process  EKG:  I have reviewed the EKG with the following interpretation:   Normal sinus rhythm  XR CHEST PORTABLE   Final Result   Portable study is limited by body habitus. Lungs are grossly clear. Status post TAVR. Left IJ tunneled dialysis catheter is in good position.              ASSESSMENT AND PLAN:    Active Hospital Problems    Diagnosis Date Noted    NSTEMI (non-ST elevated myocardial infarction) ([facility] [I21.4] 01/12/2022     NSTEMI:  Aspirin, heparin drip  Echocardiogram limited  Cardiology consulted, much appreciated    HTN Urgency:  BP meds restarted  IV PRN    Type 2 Diabetes: Insulin sliding scale  Normocytic anemia: Hemoccult, iron panel  Chronic back pain: Percocet ordered  Hyperlipidemia: Continue home medication  COPD: Not in acute exacerbation, continue home inhalers  GERD: Continue home medication  Essential Hypertension: Continue home medication  CAD: Continue home medication  Class II obesity: Complicating assessment and treatment. Placing patient at risk for multiple co-morbidities as well as early death and contributing to the patient's presentation.  Education, and counseling    Diet: NPO except meds ordered    DVT Prophylaxis: Heparin    Dispo:   Expected LOS greater than two [facility]

## 2022-01-12 NOTE — ED NOTES
Bed: 11  Expected date:   Expected time:   Means of arrival:   Comments:  [address] [facility]  01/12/22 0005

## 2022-01-12 NOTE — CONSULTS
[address]                                  CONSULTATION    PATIENT NAME: Yohana Landis                  :        1970  MED REC NO:   [id number]                          ROOM:       [id number]  ACCOUNT NO:   [de-identified]                           ADMIT DATE: 2022  PROVIDER:     Felisa Farias MD    CONSULT DATE:  2022    REASON FOR CONSULTATION:  End-stage renal disease management. HISTORY OF PRESENT ILLNESS:  The patient is a 22-year-old  male  patient with a past medical history significant for end-stage renal  disease on hemodialysis every Monday, Wednesday and Friday. The patient  presented to [facility] complaining of worsening shortness  of breath. His initial evaluation revealed a significant pulmonary  congestion on his chest x-ray compatible with pulmonary edema. The  patient was admitted. Nephrology was consulted for further management  of his dialysis needs. PAST MEDICAL HISTORY:  1. End-stage renal disease. 2.  Coronary disease. 3.  Congestive heart failure. 4.  Hypertension. 5.  Hyperlipidemia. 6.  Obstructive sleep apnea. 7.  Transient ischemic attack. PAST SURGICAL HISTORY:  1. Aortic valve replacement. 2.  Colonoscopy. 3.  AV fistula creation. 4.  Tonsillectomy. 5.  EGD. ALLERGIES:  The patient is allergic to morphine. SOCIAL HISTORY  The patient quit smoking several months ago and drinks  alcohol occasionally. FAMILY HISTORY:  Significant for coronary artery disease and diabetes  mellitus. REVIEW OF SYSTEMS:  The patient denies any nausea, vomiting or abdominal  pain. Otherwise, a 10-point review of systems was relatively  unremarkable. PHYSICAL EXAMINATION:  VITAL SIGNS:  Blood pressure 157/79, heart rate 81, respirations 20,  temperature 97.9 Fahrenheit. He is satting 99% on 30% FIO2.   GENERAL APPEARANCE:  The patient is alert and oriented x3, not in acute  distress. HEENT:  Eyes revealed normal conjunctivae and reactive pupils. NECK:  Revealed midline trachea and nonpalpable thyroid. LUNGS:  Clear to anterior auscultation bilaterally, nonlabored  breathing. CARDIOVASCULAR EXAM:  Revealed S1 and S2, regular rate and rhythm. No  murmurs or rubs. No peripheral edema. ABDOMINAL EXAM:  Revealed an obese abdomen, soft. No organomegaly. SKIN:  Revealed no lesions or rashes. Warm to touch. PSYCHIATRIC:  Revealed good judgment and insight. LYMPHATICS:  Revealed no cervical or axillary adenopathies. ASSESSMENT AND PLAN:  1. End-stage disease. We will arrange for hemodialysis today per  Monday, Wednesday, Friday schedule. 2.  No significant electrolyte disorders noted, apply free water  restriction. 3.  Hypertension, blood pressure within acceptable range. 4.  Anemia, stable hemoglobin, continue erythropoietin stimulating agent  during dialysis. 5.  Shortness of breath, we will attempt further ultrafiltration during  dialysis.         Christopher Barraza MD    D: 01/12/2022 15:32:45       T: 01/12/2022 15:36:15     [email or web address]  Job#: [id number]     Doc#: [id number]    CC:

## 2022-01-12 NOTE — CONSULTS
Patient seen and examined, consult note dictated. Assessment and Plan:    1- ESRD: We will arrange for hemodialysis today per [facility] schedule. 2- No significant electrolytes disorders noted, apply free water restriction. 3- HTN: Blood pressure within acceptable range. 4- Anemia: Stable hemoglobin, continue DAVON during dialysis. 5- Shortness of breath: Will attempt further ultrafiltration during dialysis.

## 2022-01-12 NOTE — ED NOTES
Pt gives this writer verbal consent to have hospitalist place CVC. Pt states \"I don't need to sign just do what you need to. Just don't make it hurt\".        Ida Garcia RN  01/12/22 9478

## 2022-01-12 NOTE — PROGRESS NOTES
01/12/22 0108   NIV Type   Skin Assessment Clean, dry, & intact   Skin Protection for O2 Device Yes   NIV Started/Stopped On   Equipment Type V60   Mode Bilevel   Mask Type Full face mask   Mask Size Medium   Settings/Measurements   IPAP 16 cmH20   CPAP/EPAP 6 cmH2O   Rate Ordered 8   Resp 15   FiO2  30 %   Vt Exhaled 1132 mL   Minute Volume 15.2 Liters   Mask Leak (lpm) 0 lpm   Comfort Level Good   Using Accessory Muscles Yes   SpO2 100   Breath Sounds   Right Upper Lobe Diminished   Right Middle Lobe Diminished   Right Lower Lobe Crackles   Left Upper Lobe Diminished   Left Lower Lobe Crackles   Alarm Settings   Alarms On Y

## 2022-01-12 NOTE — CONSULTS
Pharmacy to Manage Heparin Infusion per [facility]    Dx: NSTEMI  Pt wt = 114.3  Baseline anti-Xa and/or aPTT = ordered    Oral factor Xa-inhibitors may alter and elevate anti-Xa levels used for unfractionated heparin monitoring. As a result, anti-Xa monitoring is not accurate while Xa-inhibitor activity is detectable. Utilize aPTT monitoring when patient received an oral factor Xa-inhibitor (apixaban, betrixaban, edoxaban or rivaroxaban) within 72 hours prior to admission (please document last administration time). The goal is to allow a washout of oral factor Xa-inhibitors by using aPTT for 72 hours, then change to ant-Xa levels for UFH. Low Dose Heparin Infusion  Heparin 60 units/kg IVP bolus followed by Heparin infusion at 12 units/kg/hr (recommended initial max dose 1000 units./hr). Recheck aPTT in 6 hours. Goal anti-Xa 0.3-0.7 IU/mL  Goal aPTT = 60-90 seconds. Mily Aguirre Pharm D.1/12/2022 2:10 AM    1/12 1200  Anti-Xa - 0.40  Continue heparin drip at 1370 units/hr  Next anti-Xa at [address], PharmD 1/12/2022 1:11 PM    1/12  2000  Low dose Heparin GTT:  Anti-Xa at 1821 check is 0.13. Patient went to dialysis and there was an issue with the IV pump, so they did not receive the continuous infusion. Based on the result, and previous result I will re-bolus with 2,000units of heparin and continue the infusion at 13.7mL/hr. Recheck the Anti-Xa at 0200 1/13/22 per protocol. Desired Anti-Xa range is 0.3-0.7 IU/mL. Jamee Valiente, [facility] PharmD 1/12/2022 7:52 PM    1/13/2022 at 0218  Anti-XA Unfrac Heparin 0.15 Low  IU/mL   - Heparin _2000_ units IVP bolus and then increase Heparin infusion to _1600_ units/hr. - Recheck Anti-Xa in 6 hours at 1000.    Edwin Bingham, PharmD    1/13/2022 4:00 AM     1/13 1409  Anti Xa 0.08 IU/mL  Heparin 4000 units bolus and increase heparin drip rate to 2060 units/hr  Recheck level in 6 hours at 2100  Primus Dubin, PharmD  1/13/2022 at 2:44 PM    1/14/2022 at 0511   Anti-XA Unfrac Heparin 0.46 IU/mL   - No change to Heparin at this time, continue Heparin infusion at _2060_ units/hr. - Recheck Anti-Xa in 6 hours at 1100.    Dionna Park, PharmD    1/14/2022 7:35 AM

## 2022-01-12 NOTE — PROGRESS NOTES
01/12/22 0248   RT Protocol   History Pulmonary Disease 2   Respiratory pattern 0   Breath sounds 2   Cough 1   Indications for Bronchodilator Therapy Decreased or absent breath sounds; On home bronchodilators   Bronchodilator Assessment Score 5

## 2022-01-12 NOTE — Clinical Note
Patient Class: Inpatient [101]   REQUIRED: Diagnosis: NSTEMI (non-ST elevated myocardial infarction) [facility]) [[id number]]   Estimated Length of Stay: Estimated stay of more than 2 midnights   Admitting Provider: Miesha Reaves [[id number]]   Telemetry/Cardiac Monitoring Required?: Yes

## 2022-01-12 NOTE — PROGRESS NOTES
01/12/22 0345   NIV Type   NIV Started/Stopped On   Equipment Type V60   Mode Bilevel   Mask Type Full face mask   Mask Size Medium   Settings/Measurements   IPAP 16 cmH20   CPAP/EPAP 6 cmH2O   Rate Ordered 8   Resp 21   FiO2  30 %   Vt Exhaled 654 mL   Minute Volume 13.2 Liters   Mask Leak (lpm) 4 lpm   Comfort Level Good   Using Accessory Muscles No   SpO2 99   Breath Sounds   Right Upper Lobe Diminished   Right Middle Lobe Diminished   Right Lower Lobe Crackles   Left Upper Lobe Diminished   Left Lower Lobe Crackles   Alarm Settings   Alarms On Y

## 2022-01-12 NOTE — ED NOTES
[facility] Nephrology @[phone]  Per   RE flash pulmonary edema  Dr. Dalton Lind returned page @[phone]       Rianna Perez  01/12/22 0256

## 2022-01-12 NOTE — PROGRESS NOTES
01/12/22 1020   NIV Type   NIV Started/Stopped On   Equipment Type v60   Mode Bilevel   Mask Size Medium   Settings/Measurements   IPAP 16 cmH20   CPAP/EPAP 6 cmH2O   Resp 20   FiO2  30 %   Vt Exhaled 700 mL   Comfort Level Good   Using Accessory Muscles No   SpO2 97   Alarm Settings   Alarms On Y        01/12/22 1020   NIV Type   NIV Started/Stopped On   Equipment Type v60   Mode Bilevel   Mask Size Medium   Settings/Measurements   IPAP 16 cmH20   CPAP/EPAP 6 cmH2O   Resp 20   FiO2  30 %   Vt Exhaled 700 mL   Comfort Level Good   Using Accessory Muscles No   SpO2 97   Alarm Settings   Alarms On Y

## 2022-01-12 NOTE — ED PROVIDER NOTES
EMERGENCY DEPARTMENT ENCOUNTER        Pt Name: Jelena Ferreira  MRN: [id number]  [place] 1970  Date of evaluation: 1/12/2022  Provider: Alethea Nolasco MD  PCP: Renuka Wooten MD      CHIEF COMPLAINT       Chief Complaint   Patient presents with    Shortness of Breath     Pt called EMS d/t SOB that started around 6-7pm. Pt COPD, asthma, CHF. Tried inhalers and nebs at home without relief. HISTORY OFPRESENT ILLNESS   (Location/Symptom, Timing/Onset, Context/Setting, Quality, Duration, Modifying Factors,Severity)  Note limiting factors. Jelena Ferreira is a 46 y.o. male presenting today due to concern for developing severe shortness of breath starting around 1 PM today along with generalized fatigue. He does have a history of COPD and CHF. He denies any chest pain or headache. He does have a history of dialysis and does report going yesterday for a normal treatment. He does report still making urine. He tried nebulizers at home but states they were not helping. He denies any fever. Due to worsening shortness of breath, he came by EMS for further evaluation. He denies any vomiting or diarrhea. He denies any known COVID exposure. REVIEW OF SYSTEMS    (2-9 systems for level 4, 10 or more for level 5)     Review of Systems   Constitutional: Positive for fatigue. Negative for fever. HENT: Negative for congestion. Eyes: Negative for pain. Respiratory: Positive for chest tightness and shortness of breath. Cardiovascular: Positive for leg swelling. Negative for chest pain. Gastrointestinal: Negative for abdominal pain, diarrhea and vomiting. Genitourinary: Negative for difficulty urinating. Musculoskeletal: Positive for back pain (chronic, no acute changes) and gait problem (related to SOB). Negative for neck pain. Neurological: Positive for weakness (generalized) and light-headedness. Negative for syncope and headaches.    Psychiatric/Behavioral: Negative for confusion. The patient is nervous/anxious. Positives and Pertinent negatives as per HPI.       PASTMEDICAL HISTORY     Past Medical History:   Diagnosis Date    Ambulatory dysfunction     walker for long distances, SOB with distance    Aortic stenosis     echo 2017    Arthritis     hands and hips    Asthma     Bilateral hilar adenopathy syndrome 6/3/2013    CAD (coronary artery disease)     Dr. Bay Silva [facility]) 04/19/2019    EF= 43%    CHF (congestive heart failure) (HCC)     Chronic pain     COPD (chronic obstructive pulmonary disease) ([address]     pulmonology Dr. Ludy Fritz    Depression     Diabetes mellitus ([address]     borderline    Difficult intravenous access     Emphysema of lung ([address]     ESRD (end stage renal disease) on dialysis ([address]     MWF    Fear of needles     Gastric ulcer     GERD (gastroesophageal reflux disease)     Heart valve problem     bicuspic valve    Hemodialysis patient ([address]     History of spinal fracture     work incident    Hx of blood clots     Bilateral lower extremities; stents in place    Hyperlipidemia     Hypertension     MI (myocardial infarction) ([address] 2019    has had 9 MIs. 2019 was the last    Neuromuscular disorder ([address]     due to CVA    Numbness and tingling in left arm     from fistula    Pneumonia     PONV (postoperative nausea and vomiting)     Prolonged emergence from general anesthesia     States requires more medication than most people    Sleep apnea     Uses CPAP    Stroke ([address]     7mm thalamic cva 2017 deficts left side, left side weakness    TIA (transient ischemic attack)     Unspecified diseases of blood and blood-forming organs          SURGICAL HISTORY       Past Surgical History:   Procedure Laterality Date    AORTIC VALVE REPLACEMENT N/A 10/15/2019    TRANSCATHETER AORTIC VALVE REPLACEMENT FEMORAL APPROACH performed by Darling Quevedo MD at [address] Right 7/2/2019 PERITONEAL DIALYSIS CATHETER REMOVAL performed by Angel Valencia MD at [facility] COLONOSCOPY  2/29/2015    WNL    CORONARY ANGIOPLASTY WITH STENT PLACEMENT  05/26/15    CYST REMOVAL  08/14/2013    EXCISION CYSTS, NECK X2 AND ABDOMINAL benign    DIAGNOSTIC CARDIAC CATH LAB PROCEDURE      DIALYSIS FISTULA CREATION Left 10/30/2017    LEFT BRACHIAL CEPHALIC FISTULA    DIALYSIS FISTULA CREATION Left 3/27/2019    LIGATION  AV FISTULA performed by Roseline Berry MD at  [address], COLON, DIAGNOSTIC      OTHER SURGICAL HISTORY  02/01/2017    laparoscopic cholecystectomy with intraoperative cholangiogram    OTHER SURGICAL HISTORY  2018    PORT PLACEMENT  - vas cath    OTHER SURGICAL HISTORY Bilateral 06/26/2018    laprascopic peritoneal dialysis catheter placement    OTHER SURGICAL HISTORY Right 09/2018    peritoneal dialysis port placed on right side of abdomen    OTHER SURGICAL HISTORY  05/28/2019    PTA/Stenting R External Iliac artery    [place] LAP INSERTION TUNNELED INTRAPERITONEAL CATHETER N/A 9/21/2018    LAPAROSCOPIC PERITONEAL DIALYSIS CATHETER REPLACEMENT performed by Angle Valencia MD at [address]  01/06/2016    UPPER GASTROINTESTINAL ENDOSCOPY  01/29/2017    possible candida, otherwise normal appearing    VASCULAR SURGERY  aprx 2 years ago    2 stents placed, each side of groin         CURRENT MEDICATIONS       Current Discharge Medication List      CONTINUE these medications which have NOT CHANGED    Details   oxyCODONE-acetaminophen (PERCOCET) 7.5-325 MG per tablet Take 1 tablet by mouth every 6 hours as needed (pain) for up to 30 days.   Qty: 120 tablet, Refills: 0    Comments: Reduce doses taken as pain becomes manageable  Associated Diagnoses: Chronic pain syndrome      metoprolol succinate (TOPROL XL) 25 MG extended release tablet TAKE 1 TABLET BY MOUTH DAILY  Qty: 90 tablet, Refills: 3 pantoprazole (PROTONIX) 40 MG tablet TAKE 1 TABLET BY MOUTH EVERY MORNING BEFORE BREAKFAST  Qty: 30 tablet, Refills: 1    Associated Diagnoses: Gastroesophageal reflux disease without esophagitis      gabapentin (NEURONTIN) 100 MG capsule TAKE 1-2 CAPSULES BY MOUTH THREE TIMES A DAY  Qty: 180 capsule, Refills: 5    Associated Diagnoses: Chronic pain syndrome      albuterol (PROVENTIL) (2.5 MG/3ML) 0.083% nebulizer solution INHALE 1 VIAL VIA NEBULIZER EVERY 6 HOURS AS NEEDED FOR WHEEZING  Qty: 300 mL, Refills: 5      clopidogrel (PLAVIX) 75 MG tablet TAKE 1 TABLET BY MOUTH ONCE DAILY  Qty: 90 tablet, Refills: 1       MG capsule TAKE 1 CAPSULE BY MOUTH TWICE DAILY  Qty: 60 capsule, Refills: 5    Associated Diagnoses: Constipation, unspecified constipation type      cyclobenzaprine (FLEXERIL) 10 MG tablet TAKE (1) TABLET BY MOUTH EVERY 8 HOURS AS NEEDED  Qty: 90 tablet, Refills: 2    Associated Diagnoses: Chronic pain syndrome      QUEtiapine (SEROQUEL) 50 MG tablet TAKE 1 TABLET BY MOUTH IN THE EVENING  Qty: 30 tablet, Refills: 2    Associated Diagnoses: Insomnia, unspecified type; Mood disorder (HCC)      thiamine (B-1) 100 MG/ML injection Infuse 1 mL intravenously daily  Qty: 1 each, Refills: 0      Multiple Vitamins-Minerals (THERAPEUTIC MULTIVITAMIN-MINERALS) tablet Take 1 tablet by mouth daily  Refills: 0      Continuous Blood Gluc Sensor (DEXCOM G6 SENSOR) MISC Every 10 days  Qty: 9 each, Refills: 3    Associated Diagnoses: DM (diabetes mellitus), secondary, uncontrolled, w/neurologic complic (HCC)      Continuous Blood Gluc Transmit (DEXCOM G6 TRANSMITTER) MISC 1 each by Does not apply route every 3 months  Qty: 1 each, Refills: 3    Associated Diagnoses: DM (diabetes mellitus), secondary, uncontrolled, w/neurologic complic (HCC)      Continuous Blood Gluc  (DEXCOM G6 ) ADAM 1 each by Does not apply route Daily with lunch  Qty: 1 each, Refills: 0    Associated Diagnoses: DM (diabetes mellitus), secondary, uncontrolled, w/neurologic complic ([facility]      DULoxetine (CYMBALTA) 60 MG extended release capsule TAKE 1 CAPSULE BY MOUTH EVERY DAY  Qty: 30 capsule, Refills: 5    Associated Diagnoses: Depression, unspecified depression type      carvedilol (COREG) 12.5 MG tablet TAKE 1 TABLET BY MOUTH TWICE DAILY WITH MEALS  Qty: 60 tablet, Refills: 10      traZODone (DESYREL) 150 MG tablet TAKE ONE (1) TABLET BY MOUTH NIGHTLY  Qty: 30 tablet, Refills: 10      pravastatin (PRAVACHOL) 40 MG tablet TAKE 1 TABLET BY MOUTH EACH EVENING  Qty: 30 tablet, Refills: 10      B Complex-C-Folic Acid (VIRT-CAPS) 1 MG CAPS TAKE 1 CAPSULE BY MOUTH EVERY DAY  Qty: 90 capsule, Refills: 1      torsemide (DEMADEX) 100 MG tablet Take 1 tablet by mouth daily  Qty: 30 tablet, Refills: 3      Calcium Acetate, Phos Binder, 667 MG CAPS TAKE 1 CAPSULE BY MOUTH THREE TIMES DAILY WITH MEALS  Qty: 90 capsule, Refills: 3      BASAGLAR KWIKPEN 100 UNIT/ML injection pen ADMINISTER 60 UNITS UNDER THE SKIN EVERY NIGHT  Qty: 15 mL, Refills: 5    Associated Diagnoses: Type 2 diabetes mellitus with diabetic nephropathy, with long-term current use of insulin (HCC)      famotidine (PEPCID) 20 MG tablet TAKE 1 TABLET BY MOUTH TWICE DAILY AS NEEDED FOR HEARTBURN  Qty: 180 tablet, Refills: 0    Comments: **Patient requests 90 days supply**  Associated Diagnoses: Peripheral polyneuropathy      LINZESS 145 MCG capsule TAKE 1 CAPSULE BY MOUTH EVERY MORNING BEFORE BREAKFAST  Qty: 30 capsule, Refills: 10    Associated Diagnoses: Chronic idiopathic constipation      !! glucose monitoring kit (FREESTYLE) monitoring kit 1 kit by Does not apply route daily  Qty: 1 kit, Refills: 0    Comments: Which ever is coverd. !! blood glucose test strips (GLUCOSE METER TEST) strip 1 each by In Vitro route 5 times daily As needed.   Qty: 100 each, Refills: 3      nitroGLYCERIN (NITROSTAT) 0.4 MG SL tablet DISSOLVE 1 TABLET UNDER THE TONGUE AS NEEDED FOR CHEST PAIN EVERY 5 MINUTES UP TO 3 TIMES. IF NO RELIEF CALL 911. Qty: 25 tablet, Refills: 10    Associated Diagnoses: Coronary artery disease involving native heart without angina pectoris, unspecified vessel or lesion type      insulin aspart (NOVOLOG FLEXPEN) 100 UNIT/ML injection pen Inject 20 Units into the skin 3 times daily (before meals)  Qty: 15 pen, Refills: 5    Associated Diagnoses: Type 2 diabetes mellitus with diabetic nephropathy, with long-term current use of insulin ([address]      ! ! blood glucose test strips (FREESTYLE LITE) strip Daily As needed. Qty: 100 strip, Refills: 3    Comments: Please dispense what is covered by insurance      !! glucose monitoring kit (FREESTYLE) monitoring kit 1 kit by Does not apply route daily  Qty: 1 kit, Refills: 0    Comments: Please dispense what is covered by insurance      vitamin D (ERGOCALCIFEROL) 61343 units CAPS capsule TK 1 C PO WEEKLY  Refills: 11      Tiotropium Bromide-Olodaterol (STIOLTO RESPIMAT) 2.5-2.5 MCG/ACT AERS Inhale 2 puffs into the lungs daily  Qty: 2 Inhaler, Refills: 0    Comments: 2 samples given:  Lot #[id number], Exp 7/21 & Lot #[id number], Exp 7/21      Polyethylene Glycol 3350 GRAN       !! Glucose Blood (BLOOD GLUCOSE TEST STRIPS) STRP TEST 3-4 TIMES DAILY, AS DIRECTED  Qty: 100 strip, Refills: 3      Blood Glucose Monitoring Suppl ADAM USE AS DIRECTED. Qty: 1 Device, Refills: 0    Comments: WITH CONTROL SOLUTION. Alcohol Swabs PADS USE AS DIRECTED  Qty: 300 each, Refills: 3      albuterol sulfate  (90 Base) MCG/ACT inhaler Inhale 2 puffs into the lungs every 6 hours as needed for Wheezing  Qty: 1 Inhaler, Refills: 3      ipratropium-albuterol (DUONEB) 0.5-2.5 (3) MG/3ML SOLN nebulizer solution Inhale 3 mLs into the lungs every 6 hours as needed for Shortness of Breath  Qty: 360 mL, Refills: 1      calcium carbonate (TUMS) 500 MG chewable tablet Take 1 tablet by mouth 3 times daily as needed for Heartburn.        !! - Potential duplicate medications found. Please discuss with provider. ALLERGIES     Morphine    FAMILY HISTORY       Family History   Problem Relation Age of Onset    Diabetes Mother     Heart Disease Father     Kidney Disease Sister         stage 4-kidney failure    Cancer Sister     Heart Disease Sister     Obesity Sister     Cancer Sister     Heart Disease Sister     Obesity Sister     Alcohol Abuse Brother           SOCIAL HISTORY       Social History     Socioeconomic History    Marital status:      Spouse name: None    Number of children: None    Years of education: None    Highest education level: None   Occupational History    None   Tobacco Use    Smoking status: Former Smoker     Packs/day: 0.50     Years: 33.00     Pack years: 16.50     Types: Cigarettes     Quit date: 2020     Years since quittin.7    Smokeless tobacco: Never Used    Tobacco comment: [facility] quit 2021   Vaping Use    Vaping Use: Never used   Substance and Sexual Activity    Alcohol use: Not Currently     Alcohol/week: 0.0 standard drinks     Comment: occ    Drug use: No    Sexual activity: Yes     Partners: Female     Comment:    Other Topics Concern    None   Social History Narrative    None     Social Determinants of Health     Financial Resource Strain:     Difficulty of Paying Living Expenses: Not on file   Food Insecurity:     Worried About Running Out of Food in the Last Year: Not on file    Louise of Food in the Last Year: Not on file   Transportation Needs:     Lack of Transportation (Medical): Not on file    Lack of Transportation (Non-Medical):  Not on file   Physical Activity:     Days of Exercise per Week: Not on file    Minutes of Exercise per Session: Not on file   Stress:     Feeling of Stress : Not on file   Social Connections:     Frequency of Communication with Friends and Family: Not on file    Frequency of Social Gatherings with Friends and Family: Not on file   Marcus Attends Shinto Services: Not on file    Active Member of Clubs or Organizations: Not on file    Attends Club or Organization Meetings: Not on file    Marital Status: Not on file   Intimate Partner Violence:     Fear of Current or Ex-Partner: Not on file    Emotionally Abused: Not on file    Physically Abused: Not on file    Sexually Abused: Not on file   Housing Stability:     Unable to Pay for Housing in the Last Year: Not on file    Number of [place] in the Last Year: Not on file    Unstable Housing in the Last Year: Not on file       SCREENINGS    Hardy Coma Scale  Eye Opening: Spontaneous  Best Verbal Response: Oriented  Best Motor Response: Obeys commands  Hardy Coma Scale Score: 15           PHYSICAL EXAM    (up to 7 for level 4, 8 or more for level 5)     ED Triage Vitals [01/12/22 0006]   BP Temp Temp Source Pulse Resp SpO2 Height Weight   (!) 203/106 98.7 °F (37.1 °C) Oral 103 22 97 % 5' 9\" (1.753 m) 252 lb (114.3 kg)       Physical Exam  Vitals and nursing note reviewed. Constitutional:       General: He is awake. He is not in acute distress. Appearance: Normal appearance. He is well-developed and well-groomed. He is obese. He is not ill-appearing, toxic-appearing or diaphoretic. Interventions: He is not intubated. HENT:      Head: Normocephalic and atraumatic. Right Ear: External ear normal.      Left Ear: External ear normal.      Nose: Nose normal.      Mouth/Throat:      Mouth: Mucous membranes are moist.   Eyes:      General: No scleral icterus. Right eye: No discharge. Left eye: No discharge. Pupils: Pupils are equal, round, and reactive to light. Neck:      Trachea: Trachea and phonation normal. No tracheal deviation. Cardiovascular:      Rate and Rhythm: Regular rhythm. Tachycardia present. Pulses: Normal pulses. Radial pulses are 2+ on the right side and 2+ on the left side.    Pulmonary:      Effort: Pulmonary effort is normal. Tachypnea present. No bradypnea, accessory muscle usage, prolonged expiration, respiratory distress or retractions. He is not intubated. Breath sounds: Normal air entry. No stridor. Examination of the right-upper field reveals decreased breath sounds. Examination of the left-upper field reveals decreased breath sounds. Examination of the right-middle field reveals decreased breath sounds. Examination of the left-middle field reveals decreased breath sounds. Examination of the right-lower field reveals decreased breath sounds. Examination of the left-lower field reveals decreased breath sounds. Decreased breath sounds and wheezing present. No rhonchi or rales. Chest:      Chest wall: No tenderness. Abdominal:      General: Bowel sounds are normal. There is no distension. Palpations: Abdomen is soft. Tenderness: There is no abdominal tenderness. There is no guarding or rebound. Musculoskeletal:         General: No deformity. Normal range of motion. Cervical back: Full passive range of motion without pain, normal range of motion and neck supple. No edema, erythema, signs of trauma, rigidity, torticollis, tenderness, bony tenderness or crepitus. No pain with movement, spinous process tenderness or muscular tenderness. Normal range of motion. Thoracic back: No bony tenderness. Lumbar back: Tenderness (bilateral paraspinal ) present. No bony tenderness. Right lower le+ Pitting Edema present. Left lower le+ Pitting Edema present. Skin:     General: Skin is warm and dry. Coloration: Skin is not jaundiced or pale. Findings: No erythema or rash. Neurological:      General: No focal deficit present. Mental Status: He is alert and oriented to person, place, and time. GCS: GCS eye subscore is 4. GCS verbal subscore is 5. GCS motor subscore is 6. Sensory: Sensation is intact. No sensory deficit. Motor: Motor function is intact.  No weakness, tremor, atrophy, abnormal muscle tone or seizure activity. Psychiatric:         Attention and Perception: Attention normal.         Mood and Affect: Affect normal. Mood is anxious. Speech: Speech normal. Speech is not slurred. Behavior: Behavior normal. Behavior is cooperative.              DIAGNOSTIC RESULTS   :    Labs Reviewed   BRAIN NATRIURETIC PEPTIDE - Abnormal; Notable for the following components:       Result Value    Pro-BNP 57,050 (*)     All other components within normal limits    Narrative:     Performed at:  [place] [address] [employer]   Phone [phone]   CBC WITH AUTO DIFFERENTIAL - Abnormal; Notable for the following components:    WBC 11.8 (*)     RBC 3.48 (*)     Hemoglobin 10.0 (*)     Hematocrit 30.8 (*)     RDW 16.0 (*)     Neutrophils Absolute 9.6 (*)     All other components within normal limits    Narrative:     Performed at:  [place] [address] [employer]   Phone [phone]   TROPONIN - Abnormal; Notable for the following components:    Troponin 0.25 (*)     All other components within normal limits    Narrative:     CALL  [facility] tel. [phone],  Previous panic on this admission - call not needed per SOP, 01/12/2022 01:26,  by CLINTON  Performed at:  [place] [address] [employer]   Phone [phone]   COMPREHENSIVE METABOLIC PANEL - Abnormal; Notable for the following components:    Sodium 133 (*)     Chloride 91 (*)     Anion Gap 20 (*)     Glucose 381 (*)     BUN 74 (*)     CREATININE 6.6 (*)     GFR Non- 9 (*)     GFR African American 11 (*)     Albumin/Globulin Ratio 1.0 (*)     All other components within normal limits    Narrative:     CALL  [facility] tel. [phone],  Previous panic on this admission - call not needed per SOP, 01/12/2022 01:26,  by CLINTON  Performed at:  [address], [facility] [employer]   Phone [phone]   BLOOD GAS, VENOUS - Abnormal; Notable for the following components:    pCO2, Blade 38.9 (*)     pO2, Blade 65.2 (*)     Carboxyhemoglobin 3.8 (*)     All other components within normal limits    Narrative:     Performed at:  [address], [facility] [employer]   Phone [phone] - Abnormal; Notable for the following components:    Ferritin 624.0 (*)     All other components within normal limits    Narrative:     Collection has been rescheduled by Howie Marvin at 01/12/2022 11:32 Reason:   Patient not in room  [facility] at 01/12/2022 11:57 Reason:   Diaylsis draw  Performed at:  [address] [facility] [address]   Phone [phone]   IRON AND TIBC - Abnormal; Notable for the following components:    Iron 46 (*)     TIBC 257 (*)     Iron Saturation 18 (*)     All other components within normal limits    Narrative:     Collection has been rescheduled by Howie Marvin at 01/12/2022 11:32 Reason:   Patient not in room  Collection has been rescheduled by [facility] at 01/12/2022 11:57 Reason:   Diaylsis draw  Performed at:  [address] [place] [address]   Phone [phone]   TROPONIN - Abnormal; Notable for the following components:    Troponin 0.39 (*)     All other components within normal limits    Narrative:     Performed at:  [place] [address] [employer]   Phone [phone]   HEPARIN LEVEL/ANTI-XA - Abnormal; Notable for the following components:    Anti-XA Unfrac Heparin 0.13 (*)     All other components within normal limits    Narrative:     Performed at:  [address], [facility] [employer] Phone [phone]   POCT GLUCOSE - Abnormal; Notable for the following components:    POC Glucose 338 (*)     All other components within normal limits    Narrative:     Performed at:  [address], [facility] [employer]   Phone [phone]   POCT GLUCOSE - Abnormal; Notable for the following components:    POC Glucose 347 (*)     All other components within normal limits    Narrative:     Performed at:  [address], [facility] [employer]   Phone [phone]   POCT GLUCOSE - Abnormal; Notable for the following components:    POC Glucose 279 (*)     All other components within normal limits    Narrative:     Performed at:  [address], [facility] [employer]   Phone [phone]   POCT GLUCOSE - Abnormal; Notable for the following components:    POC Glucose 336 (*)     All other components within normal limits    Narrative:     Performed at:  [address], [facility] [employer]   Phone [phone], RAPID    Narrative:     Performed at:  [address], [facility] [employer]   Phone [phone]   LACTIC ACID, PLASMA    Narrative:     Performed at:  [address], [facility] [employer]   Phone [phone]   MAGNESIUM    Narrative:     Performed at:  [address], [facility] [employer]   Phone [phone]   MAGNESIUM    Narrative:     Performed at:  [address], [facility] [employer]   Phone [phone]   HEPARIN LEVEL/ANTI-XA    Narrative:     Collection has been rescheduled by [facility] at 01/12/2022 11:32 Reason:   Patient not in room  Collection has been rescheduled by [facility] at 01/12/2022 11:57 Reason: Diaylsis draw  Performed at:  [facility]  [address] [place] [address],  [place], [address] [place] [address] [place]   Phone [phone]   URINALYSIS   PROTIME-INR   HEMOGLOBIN A1C   BLOOD OCCULT STOOL SCREEN #1   TROPONIN   HEPARIN LEVEL/ANTI-XA   POCT GLUCOSE   POCT GLUCOSE   POCT GLUCOSE   POCT GLUCOSE   POCT GLUCOSE   POCT GLUCOSE   POCT GLUCOSE   POCT GLUCOSE       All other labs were within normal range or not returned asof this dictation. EKG: All EKG's are interpreted by the Emergency Department Physician who either signs or Co-signs this chart in the absence of a cardiologist.    The Ekg interpreted by me shows  normal sinus rhythm with a rate of 97  Axis is   Normal  QTc is  borderline prolonged QT  Intervals and Durations are unremarkable. ST Segments: nonspecific changes  Significant change from prior EKG dated - 11/29/21  No STEMI, new T wave inversions in lateral leads           RADIOLOGY:   Non-plain film images such as CT, Ultrasound and MRI are read by the radiologist. Ilona Keke images are visualized and preliminarily interpreted by the  ED Provider with the belowfindings:        Interpretation per the Radiologist below, if available at the time of this note:    XR CHEST PORTABLE   Final Result   Portable study is limited by body habitus. Lungs are grossly clear. Status post TAVR. Left IJ tunneled dialysis catheter is in good position. NM Cardiac Stress Test Nuclear Imaging    (Results Pending)         PROCEDURES   Unless otherwise noted below, none       PROCEDURE:   Peripheral venous access using ultrasound guidance    I personally placed an 18-gauge long angiocatheter under ultrasound guidance with sterile probe cover into the right muscular branch of bicep vein on second attempt with good return of blood and it flushed without difficulty. Patient tolerated the procedure well. This was obtained for IV access due to poor IV access elsewhere when nurses tried. Ultrasound guided peripheral venous access  Procedure note:  Indication: medication administration, lab draw, need for IV access     Billable study: yes    Views:  Long access view of target vein: Adequate  Short access view of target vein: Adequate      Images obtained with findings:   Vein compressible: yes  Needle tip within vein: yes    Impression:   Successful cannulation of vein: yes     Images obtained by Yumiko Rothman, interpreted by Yumiko Rothman. Images were saved to ultrasound machine. Procedures    CRITICAL CARE TIME   Time: 45 minutes  Includes repeat examinations, speaking with consultants, lab and x-ray interpretation, charting, peripheral IV placement, treating for acute respiratory failure due to concern for pulmonary edema  Excludes separate billable procedures. Patient at risk for serious decompensation if not treated for this life-threatening condition. CONSULTS: Spoke with Dr. Nereyda Martell for admission. Spoke with Dr. Toy Boas with nephrology and due to concern for fluid overload versus CHF exacerbation, he will have the patient added to the schedule for the morning for emergent dialysis.   IP CONSULT TO HOSPITALIST  IP CONSULT TO NEPHROLOGY  IP CONSULT TO CARDIOLOGY    EMERGENCY DEPARTMENT COURSE and DIFFERENTIAL DIAGNOSIS/MDM:   Vitals:    Vitals:    01/12/22 0438 01/12/22 0807 01/12/22 1020 01/12/22 1521   BP: (!) 181/87 (!) 157/79  124/88   Pulse: 92 81  89   Resp: 22 20 20 20   Temp: 98 °F (36.7 °C) 97.9 °F (36.6 °C)  98.2 °F (36.8 °C)   TempSrc: Oral Oral  Oral   SpO2: 98% 99%  100%   Weight:       Height:           Patient was given the following medications:  Medications   aspirin EC tablet 162 mg (162 mg Oral Not Given 1/12/22 0314)   albuterol (PROVENTIL) nebulizer solution 2.5 mg (has no administration in time range)   clopidogrel (PLAVIX) tablet 75 mg (75 mg Oral Given 1/12/22 0811)   DULoxetine (CYMBALTA) extended release capsule 60 mg (60 mg Oral Given 1/12/22 1524) pantoprazole (PROTONIX) tablet 40 mg (40 mg Oral Given 1/12/22 0811)   pravastatin (PRAVACHOL) tablet 40 mg (40 mg Oral Given 1/12/22 2057)   tiotropium-olodaterol (STIOLTO) 2.5-2.5 MCG/ACT inhaler 2 puff (0 puffs Inhalation Held 1/12/22 0808)   heparin (porcine) injection 4,000 Units (has no administration in time range)   heparin (porcine) injection 2,000 Units (has no administration in time range)   albuterol sulfate  (90 Base) MCG/ACT inhaler 2 puff (has no administration in time range)   famotidine (PEPCID) tablet 20 mg (20 mg Oral Given 1/12/22 1146)   linaclotide (LINZESS) capsule 145 mcg (has no administration in time range)   QUEtiapine (SEROQUEL) tablet 50 mg (50 mg Oral Given 1/12/22 2057)   traZODone (DESYREL) tablet 150 mg (150 mg Oral Given 1/12/22 2057)   LORazepam (ATIVAN) 2 MG/ML injection (has no administration in time range)   labetalol (NORMODYNE;TRANDATE) injection 10 mg (10 mg IntraVENous Given 1/12/22 0444)   insulin lispro (HUMALOG) injection vial 0-6 Units (4 Units SubCUTAneous Given 1/12/22 2057)   glucose (GLUTOSE) 40 % oral gel 15 g (has no administration in time range)   dextrose 50 % IV solution (has no administration in time range)   glucagon (rDNA) injection 1 mg (has no administration in time range)   dextrose 5 % solution (has no administration in time range)   heparin 25,000 units in dextrose 5% 250 mL (premix) infusion (1,370 Units/hr IntraVENous Rate/Dose Verify 1/12/22 1200)   heparin (porcine) injection 4,700 Units (4,700 Units IntraCATHeter Given 1/12/22 1251)   insulin glargine (LANTUS) injection vial 25 Units (has no administration in time range)   metoprolol succinate (TOPROL XL) extended release tablet 50 mg (has no administration in time range)   lisinopril (PRINIVIL;ZESTRIL) tablet 5 mg (has no administration in time range)   regadenoson (LEXISCAN) injection 0.4 mg (has no administration in time range)   oxyCODONE-acetaminophen (PERCOCET) 5-325 MG per tablet 1 tablet (1 tablet Oral Given 1/12/22 2106)   nitroglycerin (NITRO-BID) 2 % ointment 1 inch (1 inch Topical Given 1/12/22 0135)   ipratropium-albuterol (DUONEB) nebulizer solution 1 ampule (1 ampule Inhalation Given 1/12/22 0105)   furosemide (LASIX) injection 40 mg (40 mg IntraVENous Given 1/12/22 0134)   HYDROmorphone (DILAUDID) injection 0.5 mg (0.5 mg IntraVENous Given 1/12/22 0155)   oxyCODONE-acetaminophen (PERCOCET) 5-325 MG per tablet 1 tablet (1 tablet Oral Given 1/12/22 1620)   HYDROmorphone (DILAUDID) injection 1 mg (1 mg IntraMUSCular Given 1/12/22 0350)   heparin (porcine) injection 4,000 Units (4,000 Units IntraVENous Given 1/12/22 0600)   heparin (porcine) injection 2,000 Units (2,000 Units IntraVENous Given 1/12/22 2057)     Patient was evaluated a concern for severe shortness of breath starting this afternoon along with overall not feeling well. He did have significant hypertension on arrival and when I looked at the x-ray it did appear to have increased markings concerning for pulmonary edema. He did receive Lasix along with Nitropaste related to this. I also ordered BiPAP to see if this would help. I did speak to nephrology who did plan to dialyze the patient this morning. Upon repeat assessment he was still complaining of persistent back pain but states that is a chronic issue. He was denying any abdominal pain or chest pain and story not suggestive of aortic dissection or aortic aneurysm. His troponin was mildly elevated and therefore hospitalist felt that he should be treated for possible NSTEMI and I do feel this is reasonable. Hospitalist did attempt to get a central line but was unsuccessful and following this I ultimately was able to place a peripheral line for access. The patient was in no acute distress at time of admission and felt comfortable with this plan. The patient tolerated their visit well.    The patient and / or the family were informed of the results of any tests, a time was given to answer questions. FINAL IMPRESSION      1. Acute respiratory failure with hypoxia ([address]    2. Acute pulmonary edema (HCC)    3. NSTEMI (non-ST elevated myocardial infarction) [facility])          DISPOSITION/PLAN   DISPOSITION Admitted 01/12/2022 01:51:09 AM      PATIENT REFERRED TO:  No follow-up provider specified.     DISCHARGEMEDICATIONS:  Current Discharge Medication List          DISCONTINUED MEDICATIONS:  Current Discharge Medication List                 (Please note that portions of this note were completed with a voicerecognition program.  Efforts were made to edit the dictations but occasionally words are mis-transcribed.)    Wallace Patrick MD (electronically signed)            Wallace Patrick MD  01/12/22 3656

## 2022-01-13 ENCOUNTER — APPOINTMENT (OUTPATIENT)
Dept: NUCLEAR MEDICINE | Age: 52
DRG: 246 | End: 2022-01-13
Payer: COMMERCIAL

## 2022-01-13 ENCOUNTER — APPOINTMENT (OUTPATIENT)
Dept: CT IMAGING | Age: 52
DRG: 246 | End: 2022-01-13
Payer: COMMERCIAL

## 2022-01-13 LAB
ANTI-XA UNFRAC HEPARIN: 0.08 IU/ML (ref 0.3–0.7)
ANTI-XA UNFRAC HEPARIN: 0.15 IU/ML (ref 0.3–0.7)
ANTI-XA UNFRAC HEPARIN: <0.04 IU/ML (ref 0.3–0.7)
ESTIMATED AVERAGE GLUCOSE: 223.1 MG/DL
GLUCOSE BLD-MCNC: 160 MG/DL (ref 70–99)
GLUCOSE BLD-MCNC: 270 MG/DL (ref 70–99)
GLUCOSE BLD-MCNC: 290 MG/DL (ref 70–99)
HBA1C MFR BLD: 9.4 %
LV EF: 27 %
LVEF MODALITY: NORMAL
PERFORMED ON: ABNORMAL

## 2022-01-13 PROCEDURE — 78452 HT MUSCLE IMAGE SPECT MULT: CPT

## 2022-01-13 PROCEDURE — 94761 N-INVAS EAR/PLS OXIMETRY MLT: CPT

## 2022-01-13 PROCEDURE — 6370000000 HC RX 637 (ALT 250 FOR IP): Performed by: INTERNAL MEDICINE

## 2022-01-13 PROCEDURE — 6360000002 HC RX W HCPCS: Performed by: INTERNAL MEDICINE

## 2022-01-13 PROCEDURE — 2060000000 HC ICU INTERMEDIATE R&B

## 2022-01-13 PROCEDURE — 36415 COLL VENOUS BLD VENIPUNCTURE: CPT

## 2022-01-13 PROCEDURE — 5A1D70Z PERFORMANCE OF URINARY FILTRATION, INTERMITTENT, LESS THAN 6 HOURS PER DAY: ICD-10-PCS | Performed by: INTERNAL MEDICINE

## 2022-01-13 PROCEDURE — 85520 HEPARIN ASSAY: CPT

## 2022-01-13 PROCEDURE — 94640 AIRWAY INHALATION TREATMENT: CPT

## 2022-01-13 PROCEDURE — 99233 SBSQ HOSP IP/OBS HIGH 50: CPT | Performed by: INTERNAL MEDICINE

## 2022-01-13 PROCEDURE — 3430000000 HC RX DIAGNOSTIC RADIOPHARMACEUTICAL: Performed by: INTERNAL MEDICINE

## 2022-01-13 PROCEDURE — 94660 CPAP INITIATION&MGMT: CPT

## 2022-01-13 PROCEDURE — A9502 TC99M TETROFOSMIN: HCPCS | Performed by: INTERNAL MEDICINE

## 2022-01-13 PROCEDURE — 6370000000 HC RX 637 (ALT 250 FOR IP): Performed by: NURSE PRACTITIONER

## 2022-01-13 PROCEDURE — 93017 CV STRESS TEST TRACING ONLY: CPT

## 2022-01-13 PROCEDURE — 2700000000 HC OXYGEN THERAPY PER DAY

## 2022-01-13 RX ORDER — HEPARIN SODIUM 1000 [USP'U]/ML
4000 INJECTION, SOLUTION INTRAVENOUS; SUBCUTANEOUS ONCE
Status: COMPLETED | OUTPATIENT
Start: 2022-01-13 | End: 2022-01-13

## 2022-01-13 RX ORDER — HEPARIN SODIUM 1000 [USP'U]/ML
2000 INJECTION, SOLUTION INTRAVENOUS; SUBCUTANEOUS ONCE
Status: COMPLETED | OUTPATIENT
Start: 2022-01-13 | End: 2022-01-13

## 2022-01-13 RX ORDER — HEPARIN SODIUM 1000 [USP'U]/ML
4000 INJECTION, SOLUTION INTRAVENOUS; SUBCUTANEOUS ONCE
Status: COMPLETED | OUTPATIENT
Start: 2022-01-14 | End: 2022-01-14

## 2022-01-13 RX ADMIN — LISINOPRIL 5 MG: 5 TABLET ORAL at 14:55

## 2022-01-13 RX ADMIN — METOPROLOL SUCCINATE 50 MG: 50 TABLET, EXTENDED RELEASE ORAL at 14:55

## 2022-01-13 RX ADMIN — TETROFOSMIN 31.9 MILLICURIE: 1.38 INJECTION, POWDER, LYOPHILIZED, FOR SOLUTION INTRAVENOUS at 10:35

## 2022-01-13 RX ADMIN — TETROFOSMIN 11 MILLICURIE: 1.38 INJECTION, POWDER, LYOPHILIZED, FOR SOLUTION INTRAVENOUS at 08:45

## 2022-01-13 RX ADMIN — OXYCODONE AND ACETAMINOPHEN 1 TABLET: 5; 325 TABLET ORAL at 14:56

## 2022-01-13 RX ADMIN — CLOPIDOGREL 75 MG: 75 TABLET, FILM COATED ORAL at 14:56

## 2022-01-13 RX ADMIN — INSULIN LISPRO 1 UNITS: 100 INJECTION, SOLUTION INTRAVENOUS; SUBCUTANEOUS at 20:49

## 2022-01-13 RX ADMIN — HEPARIN SODIUM 2000 UNITS: 1000 INJECTION INTRAVENOUS; SUBCUTANEOUS at 04:45

## 2022-01-13 RX ADMIN — INSULIN GLARGINE 25 UNITS: 100 INJECTION, SOLUTION SUBCUTANEOUS at 20:49

## 2022-01-13 RX ADMIN — TIOTROPIUM BROMIDE AND OLODATEROL 2 PUFF: 3.124; 2.736 SPRAY, METERED RESPIRATORY (INHALATION) at 07:47

## 2022-01-13 RX ADMIN — REGADENOSON 0.4 MG: 0.08 INJECTION, SOLUTION INTRAVENOUS at 10:35

## 2022-01-13 RX ADMIN — DULOXETINE HYDROCHLORIDE 60 MG: 60 CAPSULE, DELAYED RELEASE ORAL at 14:55

## 2022-01-13 RX ADMIN — HEPARIN SODIUM 4000 UNITS: 1000 INJECTION INTRAVENOUS; SUBCUTANEOUS at 15:30

## 2022-01-13 RX ADMIN — INSULIN LISPRO 3 UNITS: 100 INJECTION, SOLUTION INTRAVENOUS; SUBCUTANEOUS at 15:28

## 2022-01-13 ASSESSMENT — PAIN DESCRIPTION - LOCATION
LOCATION: BACK;BUTTOCKS
LOCATION: BACK

## 2022-01-13 ASSESSMENT — PAIN SCALES - GENERAL
PAINLEVEL_OUTOF10: 0
PAINLEVEL_OUTOF10: 8
PAINLEVEL_OUTOF10: 0
PAINLEVEL_OUTOF10: 8

## 2022-01-13 ASSESSMENT — PAIN DESCRIPTION - PAIN TYPE
TYPE: CHRONIC PAIN
TYPE: CHRONIC PAIN

## 2022-01-13 ASSESSMENT — PAIN DESCRIPTION - DESCRIPTORS
DESCRIPTORS: ACHING;CONSTANT
DESCRIPTORS: ACHING;CONSTANT;THROBBING

## 2022-01-13 ASSESSMENT — PAIN DESCRIPTION - ORIENTATION
ORIENTATION: LOWER
ORIENTATION: LOWER

## 2022-01-13 NOTE — FLOWSHEET NOTE
Treatment time: 3 hours  Net UF: 1822 ml    Pre weight: 121.2 kg   Post weight: 116.6 kg  EDW: 116.0 kg    Access used: R Chest Tunneled Catheter  Access function: Fair with  ml/min    Medications or blood products given: None    Regular outpatient schedule: MWF    Summary of response to treatment: Tx ended 57 mins early r/t episode of nausea and vomiting. Attempted to restart treatment and pt. declined. Dr. Estephanie Huber notified.  Upon d/c from [address] [place] [address], pt is alert, no further episodes of vomiting, denies nausea at this time VSS, lines flushed, sealed and capped per policy, dressing to R Chest tunneled catheter is noted to be clean, dry and intact, Pt is on [email or web address] bipap removed with vomiting as pt was wearing when episode began, O2 Sats ranging 98-99%,  report called to primary nurse          Copy of dialysis treatment record placed in chart, to be scanned into EMR.       01/12/22 1028 01/12/22 1240   Vital Signs   BP (!) 178/89 138/86   Temp 97.9 °F (36.6 °C) 98.7 °F (37.1 °C)   Pulse 82 75   Resp 20 20   Weight 267 lb 3.2 oz (121.2 kg) 257 lb 0.9 oz (116.6 kg)   Weight Method Bed scale Standing scale   Percent Weight Change 6.03 -3.8   Dry Weight 255 lb 11.7 oz (116 kg) 255 lb 11.7 oz (116 kg)

## 2022-01-13 NOTE — PROGRESS NOTES
[facility]   Daily Cardiovascular Progress Note    Admit Date: 1/12/2022    CC:Shortness of Breath (Pt called EMS d/t SOB that started around 6-7pm. Pt COPD, asthma, CHF. Tried inhalers and nebs at home without relief.)      HPI: Irene Francisco has improved SOB. Medications/Labs all Reviewed:  Patient Active Problem List   Diagnosis    Acute respiratory failure with hypoxia and hypercapnia (HCC)    Chronic obstructive pulmonary disease (HCC)    CAD S/P percutaneous coronary angioplasty    PVD (peripheral vascular disease) ([address]    Nonischemic cardiomyopathy ([address]    Sleep apnea    Bicuspid aortic valve    Bilateral hilar adenopathy syndrome    Claudication in peripheral vascular disease ([address]    Essential hypertension    Diabetic neuropathy (HCC)    Type 2 diabetes, uncontrolled, with neuropathy ([address]    Passive smoke exposure    Depression with anxiety    Hematoma    Pneumonia of right upper lobe due to infectious organism    DM (diabetes mellitus), secondary, uncontrolled, w/neurologic complic ([address]    Hypertensive heart disease with congestive heart failure with preserved left ventricular function ([address]    CHF exacerbation ([address]    Chest pain    Coronary artery disease involving native coronary artery of native heart without angina pectoris    Obesity (BMI 30-39. 9)    ZAINAB on CPAP    Degeneration of lumbar or lumbosacral intervertebral disc    Lumbar radiculopathy    Lumbosacral spondylosis without myelopathy    Biliary colic    Symptomatic cholelithiasis    [facility]astroparesis due to DM ([facility]    Angina, class IV ([facility])    Dyspnea    Elevated brain natriuretic peptide (BNP) level    Dyslipidemia    Respiratory distress    Hypoxia    Chest pressure    Hypertensive urgency    Acute on chronic combined systolic and diastolic heart failure (HCC)    Ischemic cardiomyopathy    Chronic renal failure, stage 5 ([facility])    Tobacco abuse    CKD stage 4 due to type 2 diabetes mellitus ([address]    CVA (cerebral vascular accident) ([address]    Arterial ischemic stroke, ICA, right, acute ([address]    Type 2 diabetes mellitus without complication, without long-term current use of insulin ([address]    HTN (hypertension), benign    ZAINAB (obstructive sleep apnea)    Diarrhea    Pleural effusion    Chronic anemia    Nonrheumatic aortic valve stenosis    Hypervolemia    Hyperkalemia    Obesity    Mucus plugging of bronchi    Hemodialysis-associated hypotension    Left arm pain    Dizziness    ESRD (end stage renal disease) on dialysis ([facility])    Hypotension due to drugs    Acute diastolic CHF (congestive heart failure) ([facility])    Neuromuscular disorder ([facility])    Acute combined systolic and diastolic CHF, NYHA class 4 ([facility])    Renovascular hypertension    Mixed hyperlipidemia    Cigarette nicotine dependence in remission    Acute respiratory failure ([facility])    Pulmonary edema    Fluid overload    Diastolic dysfunction    Anemia of chronic disease    SOB (shortness of breath)    Steal syndrome of dialysis vascular access ([facility])    Chronic, continuous use of opioids    Chronic bronchitis ([facility])    Nasal congestion    Hypercholesteremia    Bradycardia    S/P TAVR (transcatheter aortic valve replacement)    Syncope and collapse    Atrial fibrillation ([address]    Former smoker    Generalized weakness    DKA, type 1, not at goal [facility])    Bilateral leg weakness    GBS (Guillain-[place] syndrome) ([facility])    Sinus pause    Weakness of both lower extremities    Sinus bradycardia    Ataxia    Peripheral vascular occlusive disease ([facility])    Cellulitis of right foot    Iliac artery occlusion, right ([facility])    Cellulitis and abscess of hand    Type 2 diabetes mellitus with hyperglycemia ([facility])    Acute encephalopathy    Acute hypoxemic respiratory failure (HCC)    CHF (congestive heart failure) ([facility])    Acute cerebrovascular accident (CVA) ([address]    Speech problem    Left face and left arm tingling    Urinary tract infection with hematuria    [facility]    Alcohol abuse, daily use    CHF (congestive heart failure), NYHA class I, acute on chronic, combined ([facility]    Respiratory failure with hypoxia (HCC)    Acute respiratory failure with hypercapnia (HCC)    Acute pulmonary edema (HCC)    Obesity, Class II, BMI 35-39.9    Grade II diastolic dysfunction    Shock circulatory (HCC)    Smoker    DM (diabetes mellitus), secondary uncontrolled (HCC)    Normocytic normochromic anemia    NSTEMI (non-ST elevated myocardial infarction) ([facility])       Medications:   insulin lispro  0-6 Units SubCUTAneous 4x Daily AC & HS    aspirin  162 mg Oral Once    clopidogrel  75 mg Oral Daily    DULoxetine  60 mg Oral Daily    pantoprazole  40 mg Oral QAM AC    pravastatin  40 mg Oral Nightly    tiotropium-olodaterol  2 puff Inhalation Daily    famotidine  20 mg Oral Once per day on Mon Wed Fri    linaclotide  145 mcg Oral QAM AC    QUEtiapine  50 mg Oral Nightly    insulin glargine  25 Units SubCUTAneous Nightly    metoprolol succinate  50 mg Oral Daily    lisinopril  5 mg Oral Daily       Infusion Medications:   dextrose      heparin (porcine) 1,600 Units/hr (01/13/22 0793)       Labs:  Lab Results   Component Value Date    WBC 11.8 (H) 01/12/2022    HGB 10.0 (L) 01/12/2022    HCT 30.8 (L) 01/12/2022    MCV 88.3 01/12/2022     01/12/2022     Lab Results   Component Value Date    CREATININE 6.6 (HH) 01/12/2022    BUN 74 (H) 01/12/2022     (L) 01/12/2022    K 4.4 01/12/2022    CL 91 (L) 01/12/2022    CO2 22 01/12/2022     Lab Results   Component Value Date    INR 0.98 10/11/2021    PROTIME 11.1 10/11/2021        Physical Examination:    BP (!) 146/69   Pulse 84   Temp 97.6 °F (36.4 °C) (Oral)   Resp 18   Ht 5' 9\" (1.753 m)   Wt 257 lb 0.9 oz (116.6 kg)   SpO2 96%   BMI 37.96 kg/m²    Wt Readings from Last 3 Encounters:   01/12/22 257 lb 0.9 oz (116.6 kg)   12/03/21 252 lb 6.8 oz (114.5 kg)   10/15/21 256 lb 13.4 oz (116.5 kg)       Intake/Output Summary (Last 24 hours) at 1/13/2022 1412  Last data filed at 1/13/2022 1352  Gross per 24 hour   Intake 712.42 ml   Output 775 ml   Net -62.58 ml       Respiratory:  · Resp Assessment: Normal respiratory effort  · Resp Auscultation: reduced  to auscultation bilaterally   Cardiovascular:  · Auscultation: regular rhythm and normal rate, normal S1S2, no murmur, rub or gallop  · Palpation:  Nl PMI  · JVP:  normal  · Extremities: No Edema  Abdomen:  · Soft, non-tender  · Normal bowel sounds  Extremities:  ·  No Cyanosis or Clubbing  Neurological/Psychiatric:  · Oriented to time, place, and person  · Non-anxious  Skin Warm and dry    Assessment:    SOB    Plan:  Appears mainly CHF symptoms due to combination of systolic HF from valvular heart disease and hypertension   ~MPI pending but had unremarkable cath in the past.   ~Anemia and poor functional capacity likely worsening SOB      All questions and concerns were addressed to the patient/family. Alternatives to my treatment were discussed. The note was completed using EMR. Every effort was made to ensure accuracy; however, inadvertent computerized transcription errors may be present.     April Velasquez MD, JENNIFER, [facility], -[address] [place] [address]  1/13/2022 2:12 PM

## 2022-01-13 NOTE — CONSULTS
[address], [address]                                  CONSULTATION    PATIENT NAME: Deloris Lazcano                  :        1970  MED REC NO:   [id number]                          ROOM:       [id number]  ACCOUNT NO:   [de-identified]                           ADMIT DATE: 2022  PROVIDER:     Lacie Carilsle. Bowen Nj MD    CONSULT DATE:  2022    REASON FOR CONSULTATION:  Shortness of breath, elevated troponin,  coronary artery disease. HISTORY OF PRESENT ILLNESS:  A 75-year-old male presented to the  emergency room complaining of shortness of breath. He states that he  has chronic shortness of breath that got worse over the past few days  particularly in the hours prior to admission. It was constant, severe,  worsening when he lie down, worse with any activity. He had no  associated chest pain. He has had similar symptoms in the past.  He  feels better with oxygen here in the hospital.  He is currently  undergoing dialysis for fluid removal.    PAST MEDICAL HISTORY:  1. Coronary artery disease, status post angioplasty with stent in the  past.  Most recent cardiac catheterization in 2019 was stable without  angioplasty performed at that time. 2.  Ischemic cardiomyopathy, ejection fraction has range from 40 to 50%  over the past years. 3.  Hypertension. 4.  Diabetes. 5.  End-stage renal disease, on hemodialysis. 6.  Status post TAVR. 7.  Peripheral arterial disease, status post stent in his leg. 8.  Chronic troponin elevation. 9.  Hyperlipidemia. 10.  Anemia. 11.  COPD. SOCIAL HISTORY:  He smokes. FAMILY HISTORY:  Positive for heart disease. REVIEW OF SYSTEMS:  No fevers or chills. He has had a cough. No focal  neurological symptoms. No headache or visual changes. No recent GI or   bleeding. No recent or upcoming surgeries.   All other systems are  negative except as present illness. ALLERGIES:  MORPHINE. MEDICATIONS:  See list in the chart which I reviewed. PHYSICAL EXAMINATION:  VITAL SIGNS:  Blood pressure on admission was 203/106, blood pressure  most recent 157/79, heart rate 81, respirations 20, temperature 97.9. He is 99% saturating on room air. GENERAL:  Obese white male in no acute distress. HEENT:  Normocephalic and atraumatic. Oropharynx clear. Moist mucous  membranes. NECK:  Supple. CHEST:  No wheezes or rales. Diminished breath sounds. CARDIAC:  Regular S1 and S2. There is no S3 or S4 gallop. There is  soft systolic murmur. Jugular venous pressure does not appear to be  significantly elevated. ABDOMEN:  Soft and nontender. Positive bowel sounds. EXTREMITIES:  No cyanosis. There is leg edema. NEUROLOGIC:  Grossly nonfocal.  SKIN:  Warm and dry. PSYCHIATRY:  Affect anxious. DIAGNOSTIC STUDIES:  Significant objective data includes a troponin of  0.39, creatinine of 6.6, hemoglobin 10.0, white blood cell count 11.8. Rapid COVID not detected. Chest x-ray no significant air space disease. EKG, normal sinus rhythm. Nonspecific ST-T wave abnormalities. Telemetry normal sinus rhythm. IMPRESSION:  1. Elevated troponin. The patient has a history of chronic troponin  elevation. On this admission, the elevation is out of proportion to  prior. His symptoms appear to be more pulmonary than cardiac, however  certainly ischemia needs to be considered. I suspect there is component  of volume overload despite not being evident on his chest  x-ray. 2.  Shortness of breath which appears to be multifactorial including both pulmonary and cardiac including volume overload, which is primarily related to his kidney disease. 3.  Hypertension emergency. Blood pressure now improved. 4.  Ischemic cardiomyopathy. Chronic. Ejection fraction ranges 40 to  50%.   5.  Coronary artery disease status post angioplasty with stent in the  past.  Most recent cardiac catheterization 2019 showed stable findings  with no angioplasty at that time. 6.  Carotid artery stenosis, less than 50%. 7.  Diabetes. 8.  Peripheral arterial disease, history of lower extremity stents. 9.  Status post TAVR. 10.  End-stage renal disease, on hemodialysis. 11.  Hyperlipidemia. 12.  Anemia. 13.  COPD. 14.  Smoking. RECOMMENDATIONS:  1.  Echocardiogram.  2.  Continue fluid removal at hemodialysis. 3.  Increase beta blocker. 4.  Add ACE inhibitor. 5.  Repeat ischemic evaluation. His respiratory status is prohibitive to  proceed at this time. We will plan on Lexiscan Myoview stress test, provided his respiratory status has improved and will allow him to lie flat for the test.      IVORY Spears MD    D: 01/12/2022 16:38:02       T: 01/12/2022 17:17:08     [email or web address]  Job#: [id number]     Doc#: [id number]  D: 01/12/2022 16:48:15       T: 01/12/2022 17:45:05     [email or web address]  Job#: [id number]     Doc#: [id number]

## 2022-01-13 NOTE — PROGRESS NOTES
Hospitalist Progress Note      PCP: Bev Rod MD    Date of Admission: 1/12/2022    Chief Complaint: [facility] Course:  h and p reviewed     Subjective:  No  New c/o , still sob , on o2       Medications:  Reviewed    Infusion Medications    dextrose      heparin (porcine) 1,600 Units/hr (01/13/22 0445)     Scheduled Medications    insulin lispro  0-6 Units SubCUTAneous 4x Daily AC & HS    aspirin  162 mg Oral Once    clopidogrel  75 mg Oral Daily    DULoxetine  60 mg Oral Daily    pantoprazole  40 mg Oral QAM AC    pravastatin  40 mg Oral Nightly    tiotropium-olodaterol  2 puff Inhalation Daily    famotidine  20 mg Oral Once per day on Mon Wed Fri    linaclotide  145 mcg Oral QAM AC    QUEtiapine  50 mg Oral Nightly    insulin glargine  25 Units SubCUTAneous Nightly    metoprolol succinate  50 mg Oral Daily    lisinopril  5 mg Oral Daily     PRN Meds: albuterol, heparin (porcine), heparin (porcine), albuterol sulfate HFA, traZODone, labetalol, glucose, dextrose, glucagon (rDNA), dextrose, heparin (porcine), regadenoson, oxyCODONE-acetaminophen      Intake/Output Summary (Last 24 hours) at 1/13/2022 0755  Last data filed at 1/12/2022 2339  Gross per 24 hour   Intake 360 ml   Output 450 ml   Net -90 ml       Physical Exam Performed:    /77   Pulse 88   Temp 97.6 °F (36.4 °C) (Oral)   Resp 18   Ht 5' 9\" (1.753 m)   Wt 257 lb 0.9 oz (116.6 kg)   SpO2 93%   BMI 37.96 kg/m²     General appearance: No apparent distress, appears stated age and cooperative. On o2   HEENT:  . Conjunctivae/corneas clear. Neck: Supple, with full range of motion. No jugular venous distention. Trachea midline. Respiratory:  Normal respiratory effort. C , bilaterally with  Rales/ no Wheezes/Rhonchi. Cardiovascular: Regular rate and rhythm with normal S1/S2 without murmurs, rubs or gallops. Abdomen: Soft, non-tender, non-distended with normal bowel sounds.  Obese   Musculoskeletal: No clubbing, cyanosis or edema bilaterally. Full range of motion without deformity. Skin: Skin color, texture, turgor normal.  No rashes or lesions. Neurologic:  Neurovascularly intact without any focal sensory/motor deficits. C .  Psychiatric: Alert and oriented, thought content appropriate, normal insight  Capillary Refill: Brisk,3 seconds, normal   Peripheral Pulses: +2 palpable, equal bilaterally       Labs:   Recent Labs     01/12/22  0030   WBC 11.8*   HGB 10.0*   HCT 30.8*        Recent Labs     01/12/22 0030   *   K 4.4   CL 91*   CO2 22   BUN 74*   CREATININE 6.6*   CALCIUM 9.2     Recent Labs     01/12/22 0030   AST 15   ALT 11   BILITOT <0.2   ALKPHOS 122     No results for input(s): INR in the last 72 hours. Recent Labs     01/12/22  0030 01/12/22  1200   TROPONINI 0.25* 0.39*       Urinalysis:      Lab Results   Component Value Date    NITRU Negative 09/07/2021    WBCUA 10-20 09/07/2021    BACTERIA 1+ 09/07/2021    RBCUA 3-4 09/07/2021    BLOODU TRACE-LYSED 09/07/2021    SPECGRAV 1.015 09/07/2021    GLUCOSEU 100 09/07/2021       Radiology:  XR CHEST PORTABLE   Final Result   Portable study is limited by body habitus. Lungs are grossly clear. Status post TAVR. Left IJ tunneled dialysis catheter is in good position.          NM Cardiac Stress Test Nuclear Imaging    (Results Pending)           Assessment/Plan:    Active Hospital Problems    Diagnosis     NSTEMI (non-ST elevated myocardial infarction) ([facility] [address] [I21.4]      NSTEMI: h/o CAD stent   Aspirin, heparin drip  Echocardiogram limited  Cardiology consulted, input appreciated   S/p Stress test    Aspirin / plavix / BB/ statin     CHF-  Ischemic  cardiomyopathy  chronic - systolic - - CHF 40 %- lisinopril     H/o TAVR     HTN Urgency:  BP meds restarted  IV PRN   S/p HD 1/12   Resolved   Nephrology input appreciated and following     ESRD on HD- M , W , F - nephro input appreciated and following     Type 2 Diabetes: Insulin sliding scale  Not controlled   Add Preprandial 5 tid ac       Normocytic anemia: Hemoccult, iron panel  Hb stable   continue erythropoietin stimulating agent  during dialysis. Chronic back pain: Percocet      Hyperlipidemia:  Stain    COPD: Not in acute exacerbation, continue home inhalers    GERD: Continue home medication    emilia - bipap over night     Acute hypoxic resp failure - no evidence of fluis over load , ? COPD , ischemic work in progress ,  Early CHF r/o PE  ddimer    Consider CT r/o PE if Ddimer elevated           Class II obesity: Complicating assessment and treatment. Placing patient at risk for multiple co-morbidities as well as early death and contributing to the patient's presentation.  Education, and counseling    DVT Prophylaxis: heparin   Diet: Diet NPO Exceptions are: Sips of Water with Meds  Code Status: Prior    PT/OT Eval Status:     Dispo - pending improvement     Ryan Bey MD

## 2022-01-13 NOTE — PROGRESS NOTES
Department of Internal Medicine  Nephrology Progress Note        SUBJECTIVE:    We are following this patient for ESRD management. The patient was seen and examined; he feels well today with no CP, SOB, nausea or vomiting. ROS: No fever or chills. Social: No family at bedside. Physical Exam:    VITALS:  BP (!) 146/69   Pulse 84   Temp 97.6 °F (36.4 °C) (Oral)   Resp 18   Ht 5' 9\" (1.753 m)   Wt 257 lb 0.9 oz (116.6 kg)   SpO2 96%   BMI 37.96 kg/m²     General appearance: Seems comfortable, no acute distress. Neck: Trachea midline, thyroid normal.   Lungs:  Non labored breathing, CTA to anterior auscultation. Heart:  S1S2 normal, rub or gallop. No peripheral edema. Abdomen: Soft, non-tender, no organomegaly. Skin: No lesions or rashes, warm to touch. DATA:    CBC:   Lab Results   Component Value Date    WBC 11.8 01/12/2022    RBC 3.48 01/12/2022    HGB 10.0 01/12/2022    HCT 30.8 01/12/2022    MCV 88.3 01/12/2022    MCH 28.6 01/12/2022    MCHC 32.4 01/12/2022    RDW 16.0 01/12/2022     01/12/2022    MPV 7.9 01/12/2022     BMP:    Lab Results   Component Value Date     01/12/2022    K 4.4 01/12/2022    K 5.2 11/29/2021    CL 91 01/12/2022    CO2 22 01/12/2022    BUN 74 01/12/2022    LABALBU 3.6 01/12/2022    CREATININE 6.6 01/12/2022    CALCIUM 9.2 01/12/2022    GFRAA 11 01/12/2022    GFRAA >60 05/17/2013    LABGLOM 9 01/12/2022    GLUCOSE 381 01/12/2022       IMPRESSION/RECOMMENDATIONS:      1- ESRD: We will arrange for hemodialysis tomorrow per MWF schedule. 2- No significant electrolytes disorders noted, apply free water restriction. 3- HTN: Blood pressure within acceptable range. 4- Anemia: Stable hemoglobin, continue DAVON during dialysis. 5- Shortness of breath: Will attempt further ultrafiltration during dialysis.

## 2022-01-13 NOTE — PROGRESS NOTES
A ashwin stress test was completed on this patient as ordered. The patient tolerated the procedure well. Awaiting stress imaging at this time.

## 2022-01-14 ENCOUNTER — APPOINTMENT (OUTPATIENT)
Dept: CARDIAC CATH/INVASIVE PROCEDURES | Age: 52
DRG: 246 | End: 2022-01-14
Payer: COMMERCIAL

## 2022-01-14 LAB
ANION GAP SERPL CALCULATED.3IONS-SCNC: 18 MMOL/L (ref 3–16)
ANTI-XA UNFRAC HEPARIN: 0.45 IU/ML (ref 0.3–0.7)
ANTI-XA UNFRAC HEPARIN: 0.46 IU/ML (ref 0.3–0.7)
BUN BLDV-MCNC: 68 MG/DL (ref 7–20)
CALCIUM SERPL-MCNC: 8.7 MG/DL (ref 8.3–10.6)
CHLORIDE BLD-SCNC: 94 MMOL/L (ref 99–110)
CO2: 21 MMOL/L (ref 21–32)
CREAT SERPL-MCNC: 7.2 MG/DL (ref 0.9–1.3)
D DIMER: 531 NG/ML DDU (ref 0–229)
EKG ATRIAL RATE: 70 BPM
EKG DIAGNOSIS: NORMAL
EKG P AXIS: 73 DEGREES
EKG P-R INTERVAL: 196 MS
EKG Q-T INTERVAL: 464 MS
EKG QRS DURATION: 104 MS
EKG QTC CALCULATION (BAZETT): 501 MS
EKG R AXIS: 67 DEGREES
EKG T AXIS: 113 DEGREES
EKG VENTRICULAR RATE: 70 BPM
GFR AFRICAN AMERICAN: 10
GFR NON-AFRICAN AMERICAN: 8
GLUCOSE BLD-MCNC: 158 MG/DL (ref 70–99)
GLUCOSE BLD-MCNC: 168 MG/DL (ref 70–99)
GLUCOSE BLD-MCNC: 269 MG/DL (ref 70–99)
MAGNESIUM: 2.1 MG/DL (ref 1.8–2.4)
PERFORMED ON: ABNORMAL
PERFORMED ON: ABNORMAL
POTASSIUM SERPL-SCNC: 4.7 MMOL/L (ref 3.5–5.1)
SODIUM BLD-SCNC: 133 MMOL/L (ref 136–145)

## 2022-01-14 PROCEDURE — 2580000003 HC RX 258: Performed by: INTERNAL MEDICINE

## 2022-01-14 PROCEDURE — 6370000000 HC RX 637 (ALT 250 FOR IP): Performed by: INTERNAL MEDICINE

## 2022-01-14 PROCEDURE — 93010 ELECTROCARDIOGRAM REPORT: CPT | Performed by: INTERNAL MEDICINE

## 2022-01-14 PROCEDURE — 2580000003 HC RX 258

## 2022-01-14 PROCEDURE — 99233 SBSQ HOSP IP/OBS HIGH 50: CPT | Performed by: INTERNAL MEDICINE

## 2022-01-14 PROCEDURE — 6370000000 HC RX 637 (ALT 250 FOR IP)

## 2022-01-14 PROCEDURE — 93005 ELECTROCARDIOGRAM TRACING: CPT | Performed by: INTERNAL MEDICINE

## 2022-01-14 PROCEDURE — 6360000002 HC RX W HCPCS: Performed by: INTERNAL MEDICINE

## 2022-01-14 PROCEDURE — 6360000004 HC RX CONTRAST MEDICATION

## 2022-01-14 PROCEDURE — C1887 CATHETER, GUIDING: HCPCS

## 2022-01-14 PROCEDURE — 94761 N-INVAS EAR/PLS OXIMETRY MLT: CPT

## 2022-01-14 PROCEDURE — 2709999900 HC NON-CHARGEABLE SUPPLY

## 2022-01-14 PROCEDURE — C1751 CATH, INF, PER/CENT/MIDLINE: HCPCS

## 2022-01-14 PROCEDURE — 2700000000 HC OXYGEN THERAPY PER DAY

## 2022-01-14 PROCEDURE — C1725 CATH, TRANSLUMIN NON-LASER: HCPCS

## 2022-01-14 PROCEDURE — 85379 FIBRIN DEGRADATION QUANT: CPT

## 2022-01-14 PROCEDURE — 6360000002 HC RX W HCPCS

## 2022-01-14 PROCEDURE — 93454 CORONARY ARTERY ANGIO S&I: CPT

## 2022-01-14 PROCEDURE — 90935 HEMODIALYSIS ONE EVALUATION: CPT

## 2022-01-14 PROCEDURE — 83735 ASSAY OF MAGNESIUM: CPT

## 2022-01-14 PROCEDURE — P9045 ALBUMIN (HUMAN), 5%, 250 ML: HCPCS | Performed by: INTERNAL MEDICINE

## 2022-01-14 PROCEDURE — C1894 INTRO/SHEATH, NON-LASER: HCPCS

## 2022-01-14 PROCEDURE — 92928 PRQ TCAT PLMT NTRAC ST 1 LES: CPT

## 2022-01-14 PROCEDURE — 94640 AIRWAY INHALATION TREATMENT: CPT

## 2022-01-14 PROCEDURE — 2060000000 HC ICU INTERMEDIATE R&B

## 2022-01-14 PROCEDURE — 2500000003 HC RX 250 WO HCPCS

## 2022-01-14 PROCEDURE — 85520 HEPARIN ASSAY: CPT

## 2022-01-14 PROCEDURE — C1769 GUIDE WIRE: HCPCS

## 2022-01-14 PROCEDURE — 6370000000 HC RX 637 (ALT 250 FOR IP): Performed by: NURSE PRACTITIONER

## 2022-01-14 PROCEDURE — 4A023N7 MEASUREMENT OF CARDIAC SAMPLING AND PRESSURE, LEFT HEART, PERCUTANEOUS APPROACH: ICD-10-PCS | Performed by: INTERNAL MEDICINE

## 2022-01-14 PROCEDURE — 78452 HT MUSCLE IMAGE SPECT MULT: CPT | Performed by: INTERNAL MEDICINE

## 2022-01-14 PROCEDURE — C1874 STENT, COATED/COV W/DEL SYS: HCPCS

## 2022-01-14 PROCEDURE — 94660 CPAP INITIATION&MGMT: CPT

## 2022-01-14 PROCEDURE — B2111ZZ FLUOROSCOPY OF MULTIPLE CORONARY ARTERIES USING LOW OSMOLAR CONTRAST: ICD-10-PCS | Performed by: INTERNAL MEDICINE

## 2022-01-14 PROCEDURE — 36415 COLL VENOUS BLD VENIPUNCTURE: CPT

## 2022-01-14 PROCEDURE — 80048 BASIC METABOLIC PNL TOTAL CA: CPT

## 2022-01-14 PROCEDURE — 027034Z DILATION OF CORONARY ARTERY, ONE ARTERY WITH DRUG-ELUTING INTRALUMINAL DEVICE, PERCUTANEOUS APPROACH: ICD-10-PCS | Performed by: INTERNAL MEDICINE

## 2022-01-14 RX ORDER — CLOPIDOGREL 300 MG/1
TABLET, FILM COATED ORAL
Status: COMPLETED | OUTPATIENT
Start: 2022-01-14 | End: 2022-01-14

## 2022-01-14 RX ORDER — SODIUM CHLORIDE 9 MG/ML
INJECTION, SOLUTION INTRAVENOUS CONTINUOUS
Status: DISCONTINUED | OUTPATIENT
Start: 2022-01-14 | End: 2022-01-14

## 2022-01-14 RX ORDER — ALBUMIN, HUMAN INJ 5% 5 %
25 SOLUTION INTRAVENOUS ONCE
Status: COMPLETED | OUTPATIENT
Start: 2022-01-14 | End: 2022-01-14

## 2022-01-14 RX ORDER — MIDAZOLAM HYDROCHLORIDE 5 MG/ML
INJECTION INTRAMUSCULAR; INTRAVENOUS
Status: COMPLETED | OUTPATIENT
Start: 2022-01-14 | End: 2022-01-14

## 2022-01-14 RX ORDER — FENTANYL CITRATE 50 UG/ML
INJECTION, SOLUTION INTRAMUSCULAR; INTRAVENOUS
Status: COMPLETED | OUTPATIENT
Start: 2022-01-14 | End: 2022-01-14

## 2022-01-14 RX ORDER — ASPIRIN 81 MG/1
TABLET, CHEWABLE ORAL
Status: COMPLETED | OUTPATIENT
Start: 2022-01-14 | End: 2022-01-14

## 2022-01-14 RX ORDER — FUROSEMIDE 10 MG/ML
40 INJECTION INTRAMUSCULAR; INTRAVENOUS 2 TIMES DAILY
Status: DISCONTINUED | OUTPATIENT
Start: 2022-01-14 | End: 2022-01-16

## 2022-01-14 RX ORDER — ONDANSETRON 2 MG/ML
4 INJECTION INTRAMUSCULAR; INTRAVENOUS ONCE
Status: COMPLETED | OUTPATIENT
Start: 2022-01-14 | End: 2022-01-14

## 2022-01-14 RX ORDER — ALBUMIN (HUMAN) 12.5 G/50ML
SOLUTION INTRAVENOUS
Status: DISCONTINUED
Start: 2022-01-14 | End: 2022-01-15

## 2022-01-14 RX ORDER — PROCHLORPERAZINE EDISYLATE 5 MG/ML
5 INJECTION INTRAMUSCULAR; INTRAVENOUS EVERY 6 HOURS PRN
Status: DISCONTINUED | OUTPATIENT
Start: 2022-01-14 | End: 2022-01-17 | Stop reason: HOSPADM

## 2022-01-14 RX ORDER — ONDANSETRON 2 MG/ML
4 INJECTION INTRAMUSCULAR; INTRAVENOUS ONCE
Status: DISCONTINUED | OUTPATIENT
Start: 2022-01-14 | End: 2022-01-14

## 2022-01-14 RX ADMIN — TIOTROPIUM BROMIDE AND OLODATEROL 2 PUFF: 3.124; 2.736 SPRAY, METERED RESPIRATORY (INHALATION) at 08:07

## 2022-01-14 RX ADMIN — ASPIRIN 324 MG: 81 TABLET, CHEWABLE ORAL at 11:16

## 2022-01-14 RX ADMIN — FENTANYL CITRATE 50 MCG: 50 INJECTION, SOLUTION INTRAMUSCULAR; INTRAVENOUS at 11:07

## 2022-01-14 RX ADMIN — OXYCODONE AND ACETAMINOPHEN 1 TABLET: 5; 325 TABLET ORAL at 14:36

## 2022-01-14 RX ADMIN — DULOXETINE HYDROCHLORIDE 60 MG: 60 CAPSULE, DELAYED RELEASE ORAL at 10:10

## 2022-01-14 RX ADMIN — HEPARIN SODIUM 4000 UNITS: 1000 INJECTION INTRAVENOUS; SUBCUTANEOUS at 00:06

## 2022-01-14 RX ADMIN — INSULIN LISPRO 3 UNITS: 100 INJECTION, SOLUTION INTRAVENOUS; SUBCUTANEOUS at 20:43

## 2022-01-14 RX ADMIN — METOPROLOL SUCCINATE 50 MG: 50 TABLET, EXTENDED RELEASE ORAL at 10:11

## 2022-01-14 RX ADMIN — FENTANYL CITRATE 25 MCG: 50 INJECTION, SOLUTION INTRAMUSCULAR; INTRAVENOUS at 11:58

## 2022-01-14 RX ADMIN — CLOPIDOGREL 75 MG: 75 TABLET, FILM COATED ORAL at 10:10

## 2022-01-14 RX ADMIN — OXYCODONE AND ACETAMINOPHEN 1 TABLET: 5; 325 TABLET ORAL at 20:42

## 2022-01-14 RX ADMIN — MIDAZOLAM HYDROCHLORIDE 1 MG: 5 INJECTION INTRAMUSCULAR; INTRAVENOUS at 11:07

## 2022-01-14 RX ADMIN — QUETIAPINE FUMARATE 50 MG: 25 TABLET ORAL at 20:42

## 2022-01-14 RX ADMIN — ALBUMIN (HUMAN) 25 G: 12.5 INJECTION, SOLUTION INTRAVENOUS at 16:46

## 2022-01-14 RX ADMIN — FUROSEMIDE 40 MG: 10 INJECTION, SOLUTION INTRAMUSCULAR; INTRAVENOUS at 09:57

## 2022-01-14 RX ADMIN — OXYCODONE AND ACETAMINOPHEN 1 TABLET: 5; 325 TABLET ORAL at 10:10

## 2022-01-14 RX ADMIN — INSULIN GLARGINE 25 UNITS: 100 INJECTION, SOLUTION SUBCUTANEOUS at 20:43

## 2022-01-14 RX ADMIN — PRAVASTATIN SODIUM 40 MG: 40 TABLET ORAL at 20:42

## 2022-01-14 RX ADMIN — LISINOPRIL 5 MG: 5 TABLET ORAL at 10:11

## 2022-01-14 RX ADMIN — FENTANYL CITRATE 25 MCG: 50 INJECTION, SOLUTION INTRAMUSCULAR; INTRAVENOUS at 12:03

## 2022-01-14 RX ADMIN — ONDANSETRON 4 MG: 2 INJECTION INTRAMUSCULAR; INTRAVENOUS at 12:28

## 2022-01-14 RX ADMIN — CLOPIDOGREL 600 MG: 300 TABLET, FILM COATED ORAL at 12:07

## 2022-01-14 RX ADMIN — MIDAZOLAM HYDROCHLORIDE 0.5 MG: 5 INJECTION INTRAMUSCULAR; INTRAVENOUS at 11:58

## 2022-01-14 RX ADMIN — Medication 2060 UNITS/HR: at 00:08

## 2022-01-14 ASSESSMENT — PAIN DESCRIPTION - PAIN TYPE
TYPE: CHRONIC PAIN
TYPE: CHRONIC PAIN

## 2022-01-14 ASSESSMENT — PAIN SCALES - GENERAL
PAINLEVEL_OUTOF10: 0
PAINLEVEL_OUTOF10: 10
PAINLEVEL_OUTOF10: 9

## 2022-01-14 ASSESSMENT — PAIN DESCRIPTION - LOCATION
LOCATION: BACK
LOCATION: BACK;HIP

## 2022-01-14 ASSESSMENT — PAIN DESCRIPTION - ORIENTATION: ORIENTATION: RIGHT;LEFT;LOWER

## 2022-01-14 ASSESSMENT — PAIN DESCRIPTION - DESCRIPTORS: DESCRIPTORS: ACHING;CONSTANT

## 2022-01-14 NOTE — PROGRESS NOTES
Spoke to pt about CT scan needing done. He stated he did not want to get it at this hour. Spoke to CT tech. Plan to organize transport for AM and get CT before HD.

## 2022-01-14 NOTE — PROGRESS NOTES
[facility] 79   Daily Cardiovascular Progress Note    Admit Date: 1/12/2022    CC:Shortness of Breath (Pt called EMS d/t SOB that started around 6-7pm. Pt COPD, asthma, CHF. Tried inhalers and nebs at home without relief.)      HPI: Rebecca Flores has on-going SOB. Medications/Labs all Reviewed:  Patient Active Problem List   Diagnosis    Acute respiratory failure with hypoxia and hypercapnia (HCC)    Chronic obstructive pulmonary disease (HCC)    CAD S/P percutaneous coronary angioplasty    PVD (peripheral vascular disease) ([address]    Nonischemic cardiomyopathy ([address]    Sleep apnea    Bicuspid aortic valve    Bilateral hilar adenopathy syndrome    Claudication in peripheral vascular disease ([address]    Essential hypertension    Diabetic neuropathy [facility]    Type 2 diabetes, uncontrolled, with neuropathy ([address]    Passive smoke exposure    Depression with anxiety    Hematoma    Pneumonia of right upper lobe due to infectious organism    DM (diabetes mellitus), secondary, uncontrolled, w/neurologic complic ([address]    Hypertensive heart disease with congestive heart failure with preserved left ventricular function ([address]    CHF exacerbation ([address]    Chest pain    Coronary artery disease involving native coronary artery of native heart without angina pectoris    Obesity (BMI 30-39. 9)    ZAINAB on CPAP    Degeneration of lumbar or lumbosacral intervertebral disc    Lumbar radiculopathy    Lumbosacral spondylosis without myelopathy    Biliary colic    Symptomatic cholelithiasis    Gastroparesis due to DM ([facility]    Angina, class IV ([facility])    Dyspnea    Elevated brain natriuretic peptide (BNP) level    Dyslipidemia    Respiratory distress    Hypoxia    Chest pressure    Hypertensive urgency    Acute on chronic combined systolic and diastolic heart failure (HCC)    Ischemic cardiomyopathy    Chronic renal failure, stage 5 ([facility]    Tobacco abuse    CKD stage 4 due to type 2 diabetes mellitus ([address]    CVA (cerebral vascular accident) ([address]    Arterial ischemic stroke, ICA, right, acute ([address]    Type 2 diabetes mellitus without complication, without long-term current use of insulin ([address]    HTN (hypertension), benign    ZAINAB (obstructive sleep apnea)    Diarrhea    Pleural effusion    Chronic anemia    Nonrheumatic aortic valve stenosis    Hypervolemia    Hyperkalemia    Obesity    Mucus plugging of bronchi    Hemodialysis-associated hypotension    Left arm pain    Dizziness    ESRD (end stage renal disease) on dialysis ([facility])    Hypotension due to drugs    Acute diastolic CHF (congestive heart failure) ([facility])    Neuromuscular disorder ([facility])    Acute combined systolic and diastolic CHF, NYHA class 4 ([facility])    Renovascular hypertension    Mixed hyperlipidemia    Cigarette nicotine dependence in remission    Acute respiratory failure ([facility])    Pulmonary edema    Fluid overload    Diastolic dysfunction    Anemia of chronic disease    SOB (shortness of breath)    Steal syndrome of dialysis vascular access ([facility])    Chronic, continuous use of opioids    Chronic bronchitis ([facility])    Nasal congestion    Hypercholesteremia    Bradycardia    S/P TAVR (transcatheter aortic valve replacement)    Syncope and collapse    Atrial fibrillation ([address]    Former smoker    Generalized weakness    DKA, type 1, not at goal [facility])    Bilateral leg weakness    GBS (Guillain-Mead syndrome) ([facility])    Sinus pause    Weakness of both lower extremities    Sinus bradycardia    Ataxia    Peripheral vascular occlusive disease ([facility])    Cellulitis of right foot    Iliac artery occlusion, right ([facility])    Cellulitis and abscess of hand    Type 2 diabetes mellitus with hyperglycemia ([facility])    Acute encephalopathy    Acute hypoxemic respiratory failure (HCC)    CHF (congestive heart failure) ([facility])    Acute cerebrovascular accident (CVA) ([address]    Speech problem    Left face and left arm tingling    Urinary tract infection with hematuria    Acute CVA (cerebrovascular accident) [facility] [address]    Alcohol abuse, daily use    CHF (congestive heart failure), NYHA class I, acute on chronic, combined ([facility] [address]    Respiratory failure with hypoxia (HCC)    Acute respiratory failure with hypercapnia (HCC)    Acute pulmonary edema (HCC)    Obesity, Class II, BMI 35-39.9    Grade II diastolic dysfunction    Shock circulatory ([facility])    Smoker    DM (diabetes mellitus), secondary uncontrolled (HCC)    Normocytic normochromic anemia    NSTEMI (non-ST elevated myocardial infarction) ([facility])       Medications:   furosemide  40 mg IntraVENous BID    insulin lispro  0-6 Units SubCUTAneous 4x Daily AC & HS    aspirin  162 mg Oral Once    clopidogrel  75 mg Oral Daily    DULoxetine  60 mg Oral Daily    pantoprazole  40 mg Oral QAM AC    pravastatin  40 mg Oral Nightly    tiotropium-olodaterol  2 puff Inhalation Daily    famotidine  20 mg Oral Once per day on Mon Wed Fri    linaclotide  145 mcg Oral QAM AC    QUEtiapine  50 mg Oral Nightly    insulin glargine  25 Units SubCUTAneous Nightly    metoprolol succinate  50 mg Oral Daily    lisinopril  5 mg Oral Daily       Infusion Medications:   dextrose      heparin (porcine) 2,060 Units/hr (01/14/22 0008)       Labs:  Lab Results   Component Value Date    WBC 11.8 (H) 01/12/2022    HGB 10.0 (L) 01/12/2022    HCT 30.8 (L) 01/12/2022    MCV 88.3 01/12/2022     01/12/2022     Lab Results   Component Value Date    CREATININE 7.2 (HH) 01/14/2022    BUN 68 (H) 01/14/2022     (L) 01/14/2022    K 4.7 01/14/2022    CL 94 (L) 01/14/2022    CO2 21 01/14/2022     Lab Results   Component Value Date    INR 0.98 10/11/2021    PROTIME 11.1 10/11/2021        Physical Examination:    /72   Pulse 75   Temp 97.4 °F (36.3 °C) (Oral)   Resp 22   Ht 5' 9\" (1.753 m)   Wt 265 lb 10.5 oz (120.5 kg)   SpO2 98%   BMI 39.23 kg/m²    Wt Readings from Last 3 Encounters:   01/14/22 265 lb 10.5 oz (120.5 kg)   12/03/21 252 lb 6.8 oz (114.5 kg)   10/15/21 256 lb 13.4 oz (116.5 kg)       Intake/Output Summary (Last 24 hours) at 1/14/2022 0844  Last data filed at 1/14/2022 0440  Gross per 24 hour   Intake 106.35 ml   Output 325 ml   Net -218.65 ml       Respiratory:  · Resp Assessment: increased respiratory effort and work of breathing  · Resp Auscultation: reduced  to auscultation bilaterally, on bipap  Cardiovascular:  · Auscultation: regular rhythm and normal rate, normal S1S2, no murmur, rub or gallop  · Palpation:  Nl PMI  · JVP:  normal  · Extremities: No Edema  Abdomen:  · Soft, non-tender  · Normal bowel sounds  Extremities:  ·  No Cyanosis or Clubbing  Neurological/Psychiatric:  · Oriented to time, place, and person  · Non-anxious  Skin Warm and dry    Assessment:    SOB    Plan:  Appears mainly CHF symptoms due to combination of systolic HF from valvular heart disease and hypertension   ~MPI shows severe fixed defects   ~Anemia and poor functional capacity likely worsening SOB  Will consider RHC +/- LHC if able to get timing in lab this AM. Otherwise, will be pushed to next week. All questions and concerns were addressed to the patient/family. Alternatives to my treatment were discussed. The note was completed using EMR. Every effort was made to ensure accuracy; however, inadvertent computerized transcription errors may be present.     Ryan Crouch MD, JENNIFER, [facility], -[address] [place] [address]  1/14/2022 8:44 AM

## 2022-01-14 NOTE — POST SEDATION
Patient:  Cecilia Dunn   :   1970    A pre-sedation re-evaluation was performed immediately at the end of the procedure. Procedure:  Cardiac cath  Medications: Procedural sedation with minimal conscious sedation  Complications: None  Estimated Blood Loss: none  Specimens: Were not obtained        Marivel Medication and Procedural Reconciliation:  I agree that the documented medications and procedures performed are true. The medications were given under my order. The procedures were performed under my direct supervision.

## 2022-01-14 NOTE — PROGRESS NOTES
Hospitalist Progress Note      PCP: Court Harada, MD    Date of Admission: 1/12/2022    Chief Complaint: [facility] Course:  h and p reviewed     Subjective:  No  New c/o , still sob , on o2 , refused CTPA last night , No good IV access now       Medications:  Reviewed    Infusion Medications    dextrose      heparin (porcine) Stopped (01/14/22 1107)     Scheduled Medications    furosemide  40 mg IntraVENous BID    insulin lispro  0-6 Units SubCUTAneous 4x Daily AC & HS    aspirin  162 mg Oral Once    clopidogrel  75 mg Oral Daily    DULoxetine  60 mg Oral Daily    pantoprazole  40 mg Oral QAM AC    pravastatin  40 mg Oral Nightly    tiotropium-olodaterol  2 puff Inhalation Daily    famotidine  20 mg Oral Once per day on Mon Wed Fri    linaclotide  145 mcg Oral QAM AC    QUEtiapine  50 mg Oral Nightly    insulin glargine  25 Units SubCUTAneous Nightly    metoprolol succinate  50 mg Oral Daily    lisinopril  5 mg Oral Daily     PRN Meds: prochlorperazine, iopamidol, albuterol, heparin (porcine), heparin (porcine), albuterol sulfate HFA, traZODone, labetalol, glucose, dextrose, glucagon (rDNA), dextrose, heparin (porcine), oxyCODONE-acetaminophen      Intake/Output Summary (Last 24 hours) at 1/14/2022 1423  Last data filed at 1/14/2022 1357  Gross per 24 hour   Intake 196.35 ml   Output 350 ml   Net -153.65 ml       Physical Exam Performed:    BP (!) 109/59   Pulse 72   Temp 97.3 °F (36.3 °C) (Oral)   Resp 18   Ht 5' 9\" (1.753 m)   Wt 265 lb 10.5 oz (120.5 kg)   SpO2 93%   BMI 39.23 kg/m²     General appearance: No apparent distress, appears stated age and cooperative. On o2 , obese   HEENT:  . Conjunctivae/corneas clear. Neck: Supple, with full range of motion. No jugular venous distention. Trachea midline. Respiratory:  Normal respiratory effort. C , bilaterally with  Rales/ no Wheezes/Rhonchi.   Cardiovascular: Regular rate and rhythm with normal S1/S2 without murmurs, rubs or gallops. Abdomen: Soft, non-tender, non-distended with normal bowel sounds. Obese   Musculoskeletal: No clubbing, cyanosis or edema bilaterally. Full range of motion without deformity. Skin: Skin color, texture, turgor normal.  Ulcer over thr right 4 th toe as well as Right shin. Neurologic:  Neurovascularly intact without any focal sensory/motor deficits. C .  Psychiatric: Alert and oriented, thought content appropriate, normal insight  Capillary Refill: Brisk,3 seconds, normal   Peripheral Pulses: +2 palpable, equal bilaterally       Labs:   Recent Labs     01/12/22  0030   WBC 11.8*   HGB 10.0*   HCT 30.8*        Recent Labs     01/12/22  0030 01/14/22  0511   * 133*   K 4.4 4.7   CL 91* 94*   CO2 22 21   BUN 74* 68*   CREATININE 6.6* 7.2*   CALCIUM 9.2 8.7     Recent Labs     01/12/22 0030   AST 15   ALT 11   BILITOT <0.2   ALKPHOS 122     No results for input(s): INR in the last 72 hours. Recent Labs     01/12/22  0030 01/12/22  1200   TROPONINI 0.25* 0.39*       Urinalysis:      Lab Results   Component Value Date    NITRU Negative 09/07/2021    WBCUA 10-20 09/07/2021    BACTERIA 1+ 09/07/2021    RBCUA 3-4 09/07/2021    BLOODU TRACE-LYSED 09/07/2021    SPECGRAV 1.015 09/07/2021    GLUCOSEU 100 09/07/2021       Radiology:  CT CHEST PULMONARY EMBOLISM W CONTRAST         NM Cardiac Stress Test Nuclear Imaging   Final Result      XR CHEST PORTABLE   Final Result   Portable study is limited by body habitus. Lungs are grossly clear. Status post TAVR. Left IJ tunneled dialysis catheter is in good position.                  Assessment/Plan:    Active Hospital Problems    Diagnosis     NSTEMI (non-ST elevated myocardial infarction) ([facility] [address] [I21.4]     SOB (shortness of breath) on exertion [R06.02]      NSTEMI: h/o CAD stent   Aspirin, heparin drip  Echocardiogram limited  Cardiology consulted, input appreciated and following   S/p Stress test  - abnormal   Aspirin / plavix / BB/ statin   S/p [facility] 1/14 - PATRICIA - RCA    CHF-  Ischemic  cardiomyopathy  chronic - systolic - - CHF 40 %- lisinopril     H/o TAVR     HTN Urgency:  BP meds restarted  IV PRN   S/p HD 1/12   Resolved   Nephrology input appreciated and following    Would like to keep BP higler end as his BP dropped with HD- DW nephro    ESRD on HD- M , W , F - nephro input appreciated and following     Type 2 Diabetes: Insulin sliding scale   better  controlled   Add Preprandial 5 tid ac if necessary   lantus 25 with SSI      Normocytic anemia: Hemoccult, iron panel  Hb stable   continue erythropoietin stimulating agent  during dialysis. Chronic back pain: Percocet      Hyperlipidemia:  Stain    COPD: Not in acute exacerbation, continue home inhalers    GERD: Continue home medication    emilia - bipap over night     Acute hypoxic resp failure - sob   Possible 2/2 CHF/ valvular heart dis   ddimer pending   On torsemide at home   Started IV lasix   Wean o2  Hold of CTPA    RLE wound - wound care     Class II obesity: Complicating assessment and treatment. Placing patient at risk for multiple co-morbidities as well as early death and contributing to the patient's presentation. Education, and counseling    DVT Prophylaxis: heparin   Diet: ADULT DIET;  Regular  Code Status: Full Code    PT/OT Eval Status:     Dispo - pending improvement     Fredrick Mann MD

## 2022-01-14 NOTE — PROGRESS NOTES
01/14/22 0012   NIV Type   Skin Protection for O2 Device Yes   Equipment Type v60   Mode Bilevel   Mask Type Full face mask   Mask Size Medium   Settings/Measurements   IPAP 16 cmH20   CPAP/EPAP 6 cmH2O   Rate Ordered 12   Resp 17   FiO2  30 %   Vt Exhaled 678 mL   Minute Volume 11.2 Liters   Mask Leak (lpm) 21 lpm   Comfort Level Good   Using Accessory Muscles No   Alarm Settings   Alarms On Y   Press Low Alarm 6 cmH2O   High Pressure Alarm 30 cmH2O   Delay Alarm 20 sec(s)   Resp Rate Low Alarm 6   High Respiratory Rate 40 br/min

## 2022-01-14 NOTE — FLOWSHEET NOTE
01/13/22 1453   Assessment   Charting Type Shift assessment   Neurological   Neuro (WDL) WDL   Level of Consciousness Alert (0)   Robert Coma Scale   Eye Opening 4   Best Verbal Response 5   Best Motor Response 6   Robert Coma Scale Score 15   HEENT   HEENT (WDL) X   Right Eye Intact; Impaired vision   Left Eye Intact; Impaired vision   Nose Intact   Throat Intact   Neck Tenderness;Trachea midline;Symmetrical  (Pain Rt side of neck (from central line attempt))   Tongue Pink & moist   Voice Normal   Mucous Membrane Moist;Pink; Intact   Teeth Intact   Respiratory   Respiratory (WDL) X   Respiratory Pattern Tachypneic   Respiratory Depth Normal   Respiratory Quality/Effort Dyspnea with exertion   Chest Assessment Trachea midline; Chest expansion symmetrical   L Breath Sounds Diminished   R Breath Sounds Diminished   Breath Sounds   Right Upper Lobe Clear;Diminished   Right Middle Lobe Diminished   Right Lower Lobe Diminished   Left Upper Lobe Clear;Diminished   Left Lower Lobe Diminished   Cardiac   Cardiac (WDL) WDL   Rhythm Interpretation   Pulse 89   Cardiac Monitor   Telemetry Monitor On Yes   Telemetry Audible Yes   Telemetry Alarms Set Yes   Gastrointestinal   Abdominal (WDL) WDL   Peripheral Vascular   Peripheral Vascular (WDL) X   Edema Right lower extremity; Left lower extremity;Right upper extremity; Left upper extremity   RUE Edema +2;Non-pitting   LUE Edema +2;Non-pitting   RLE Edema +2;Pitting   LLE Edema +2;Pitting   Skin Color/Condition   Skin Color/Condition (WDL) X   Skin Color Appropriate for ethnicity   Skin Condition/Temp Dry;Flaky; Swollen; Warm   Skin Integrity   Skin Integrity (WDL) X   Skin Integrity Bruising;Blister   Location Scattered; Bilateral feet   Multiple Skin Integrity Sites Yes   Skin Integrity Site 2   Skin Integrity Location 2 Blister; Other (Comment)  (Old, scabbed blister  )   Location 2 Top of Right Foot   Preventative Dressing No   Assessed this shift?  Yes  (Betadine applied)   Skin Integrity Site 3   Skin Integrity Location 3 Blister; Other (Comment)  ([de-identified] old blister)    Location 3 Right 4th toe   Preventative Dressing No   Assessed this shift? Yes  (Betadine applied)   Musculoskeletal   Musculoskeletal (WDL) X   RUE Swelling; Full movement   LUE Swelling; Full movement;Weakness   RL Extremity Swelling; Full movement;Weakness   LL Extremity Swelling; Full movement;Weakness   Genitourinary   Genitourinary (WDL) WDL   Flank Tenderness No   Suprapubic Tenderness No   Dysuria No   Anus/Rectum   Anus/Rectum (WDL) WDL   Wound 01/12/22 Pedal Anterior;Right Healing scab from previous blister (per pt), flaky edges   Date First Assessed: 01/12/22   Present on Hospital Admission: Yes  Primary Wound Type: (c) Other (comment)  Location: Pedal  Wound Location Orientation: Anterior;Right  Wound Description (Comments): Healing scab from previous blister (per pt), flaky . .. Dressing Status   (ALFREDITO)   Wound Cleansed Betadine/povidone iodine   Dressing/Treatment Open to air   Wound Length (cm) 2 cm   Wound Width (cm) 1.6 cm   Wound Surface Area (cm^2) 3.2 cm^2   Wound Assessment Dry;Purple/maroon   Drainage Amount None   Odor None   Liberty-wound Assessment Dry/flaky   Margins Defined edges   Wound 01/12/22 Toe (Comment  which one) Anterior;Right Scab from old blister (per pt) on 4th toe, flaky edges   Date First Assessed: 01/12/22   Present on Hospital Admission: (c) Yes  Primary Wound Type: (c) Other (comment)  Location: Toe (Comment  which one)  Wound Location Orientation: Anterior;Right  Wound Description (Comments): Scab from old blister (per p. ..    Dressing Status   (ALFREDITO)   Wound Cleansed Betadine/povidone iodine   Dressing/Treatment Open to air   Wound Length (cm) 2.4 cm   Wound Width (cm) 0.6 cm   Wound Surface Area (cm^2) 1.44 cm^2   Wound Assessment Dry;Purple/maroon   Drainage Amount None   Odor None   Liberty-wound Assessment Dry/flaky   Margins Defined edges   Psychosocial   Psychosocial (WDL) WDL

## 2022-01-14 NOTE — CARE COORDINATION
Chart reviewed day #2. Patient had stent placement today during cardiac cath. He is currently in dialysis and established in the community at the [facility]. He was supposed to get CT to rule out PE but has no IV access. Patient had previously told CM that he thought he would need oxygen at discharge, however, patient is on room air so does not qualify at this time. CM will continue to follow.

## 2022-01-14 NOTE — PROGRESS NOTES
Paged Dr. Hameed Keen regarding need for IV access appropriate for CT scan. 1/14/22 12:27 PM   [phone] Hospital or Facility: [facility] From: Ab Hobbs RE: Niraj Melgozasiomn 1970 RM: 26 FYI: Unable to obtain appropriate size & placement of IV in order for pt to got to CT. Currently only has a 20g in right hand. Cath lab team attempted to place line during procedure today, unable to keep it functioning. Multiple attempts have been made, including attempt at a central line placement in ER initially upon admission. Need Callback: NO CALLBACK REQ C4 PROGRESSIVE CARE  Read 12:41 PM       1/14/22 12:42 PM   Whats the d diner     1/14/22 12:42 PM   Dimer      1/14/22 12:50 PM   Currently in process, lab at bedside now. Also, could you order PRN antiemetic? pt c/o severe nausea after returning from cath lab. Read 1:17 PM       1/14/22 1:17 PM   Zofran 4 mg IV once      1/14/22 1:29 PM   Ordered. Thank you. Unread    1/14/22 1:38 PM   Just obtained post-cath EKG and it shows prolonged QT - would you like to proceed with the one time dose of Zofran or order something else?   Unread

## 2022-01-14 NOTE — PRE SEDATION
Brief Pre-Op Note/Sedation Assessment      Juan Francisco Nesbitt  1970  [id number]  10:51 AM    Planned Procedure: Cardiac Catheterization Procedure  Post Procedure Plan: Return to same level of care  Consent: I have discussed with the patient and/or the patient representative the indication, alternatives, and the possible risks and/or complications of the planned procedure and the anesthesia methods. The patient and/or patient representative appear to understand and agree to proceed. Chief Complaint:   NSTEMI  Dyspnea on Exertion  Dyspnea      Indications for Cath Procedure:  1. Presentation:  Suspected CAD and Cardiomyopathy  2. Anginal Classification within 2 weeks:  CCS IV - Inability to perform any activity without angina or angina at rest, i.e., severe limitation  3. Angina Symptoms Assessment:  Atypical Chest Pain  4. Heart Failure Class within last 2 weeks:  Yes:  Heart Failure Type: Systolic Severity:  Class IV - Symptoms of HF at rest  5. Cardiovascular Instability:  Yes:  Decompensating heart failure    Prior Ischemic Workup/Eval:  1. Pre-Procedural Medications: Yes: Aspirin, Beta Blockers and STATIN  2. Stress Test Completed? Yes:  Stress or Imaging Studies Performed (within ANY time period):   Type:  Stress Nuclear  Results:  Positive:  Myocardial Perfusion Defects (Nuclear) Extent of Ischemia:  High Risk (>3% annual death or MI)    Does Patient need surgery? Cath Valve Surgery:  No    Pre-Procedure Medical History:  Vital Signs:  /72   Pulse 75   Temp 97.4 °F (36.3 °C) (Oral)   Resp 22   Ht 5' 9\" (1.753 m)   Wt 265 lb 10.5 oz (120.5 kg)   SpO2 98%   BMI 39.23 kg/m²     Allergies:     Allergies   Allergen Reactions    Morphine Nausea And Vomiting     Medications:    Current Facility-Administered Medications   Medication Dose Route Frequency Provider Last Rate Last Admin    furosemide (LASIX) injection 40 mg  40 mg IntraVENous BID Alonso Crain MD   40 mg at 01/14/22 0957    insulin lispro (HUMALOG) injection vial 0-6 Units  0-6 Units SubCUTAneous 4x Daily AC & HS Mariel Carvajal MD   1 Units at 01/13/22 2049    iopamidol (ISOVUE-370) 76 % injection 75 mL  75 mL IntraVENous ONCE PRN Mariel Carvajal MD        aspirin EC tablet 162 mg  162 mg Oral Once Squire Most A Yimi, DO        albuterol (PROVENTIL) nebulizer solution 2.5 mg  2.5 mg Nebulization Q4H PRN Bret Jane, DO        clopidogrel (PLAVIX) tablet 75 mg  75 mg Oral Daily Ahmad A Yimi, DO   75 mg at 01/14/22 1010    DULoxetine (CYMBALTA) extended release capsule 60 mg  60 mg Oral Daily Ahmad A Yimi, DO   60 mg at 01/14/22 1010    pantoprazole (PROTONIX) tablet 40 mg  40 mg Oral QAM AC [facility]  Yimi, DO   40 mg at 01/12/22 0811    pravastatin (PRAVACHOL) tablet 40 mg  40 mg Oral Nightly mad A Yimi, DO   40 mg at 01/12/22 2057    tiotropium-olodaterol (STIOLTO) 2.5-2.5 MCG/ACT inhaler 2 puff  2 puff Inhalation Daily [facility] ERICA Geller, DO   2 puff at 01/14/22 0807    heparin (porcine) injection 4,000 Units  4,000 Units IntraVENous PRN Squire Most A Yimi, DO        heparin (porcine) injection 2,000 Units  2,000 Units IntraVENous PRN [facility] ERICA Geller, DO        albuterol sulfate  (90 Base) MCG/ACT inhaler 2 puff  2 puff Inhalation Q6H PRN [facility] ERICA Geller, DO        famotidine (PEPCID) tablet 20 mg  20 mg Oral Once per day on Mon Wed Fri mad ERICA Greenfieldzi, DO   20 mg at 01/12/22 1146    linaclotide (LINZESS) capsule 145 mcg  145 mcg Oral QAM AC Ahmad ERICA Greenfieldzi, DO        QUEtiapine (SEROQUEL) tablet 50 mg  50 mg Oral Nightly Ahmad A Yimi, DO   50 mg at 01/12/22 2057    traZODone (DESYREL) tablet 150 mg  150 mg Oral Nightly PRN Ahmad A Yimi, DO   150 mg at 01/12/22 2057    labetalol (NORMODYNE;TRANDATE) injection 10 mg  10 mg IntraVENous Q4H PRN Ahmad A Yimi, DO   10 mg at 01/12/22 0444    glucose (GLUTOSE) 40 % oral gel 15 g  15 g Oral PRN Ahmad A Yimi, DO        dextrose 50 % IV solution  12.5 g IntraVENous PRN Eloy Burkitt, DO        glucagon (rDNA) injection 1 mg  1 mg IntraMUSCular PRN [facility] ERICA Geller DO        dextrose 5 % solution  100 mL/hr IntraVENous PRN Margaret Geller DO        heparin 25,000 units in dextrose 5% 250 mL (premix) infusion  2,060 Units/hr IntraVENous Continuous Darlin Bingham MD 20.6 mL/hr at 01/14/22 0008 2,060 Units/hr at 01/14/22 0008    heparin (porcine) injection 4,700 Units  4,700 Units IntraCATHeter PRN Antonieta Baez MD   4,700 Units at 01/12/22 1251    insulin glargine (LANTUS) injection vial 25 Units  25 Units SubCUTAneous Nightly Darlin Bingham MD   25 Units at 01/13/22 2049    metoprolol succinate (TOPROL XL) extended release tablet 50 mg  50 mg Oral Daily Pablo Allen MD   50 mg at 01/14/22 1011    lisinopril (PRINIVIL;ZESTRIL) tablet 5 mg  5 mg Oral Daily Pablo Allen MD   5 mg at 01/14/22 1011    oxyCODONE-acetaminophen (PERCOCET) 5-325 MG per tablet 1 tablet  1 tablet Oral Q4H PRN Caitlin JAY [facility] - CNP   1 tablet at 01/14/22 1010       Past Medical History:    Past Medical History:   Diagnosis Date    Ambulatory dysfunction     walker for long distances, SOB with distance    Aortic stenosis     echo 2017    Arthritis     hands and hips    Asthma     Bilateral hilar adenopathy syndrome 6/3/2013    CAD (coronary artery disease)     Dr. Timothy Davis [facility]) 04/19/2019    EF= 43%    CHF (congestive heart failure) (HCC)     Chronic pain     COPD (chronic obstructive pulmonary disease) ([facility]     pulmonology Dr. Caroline Garcia    Depression     Diabetes mellitus ([facility]     borderline    Difficult intravenous access     Emphysema of lung ([facility]     ESRD (end stage renal disease) on dialysis ([facility]     MWF    Fear of needles     Gastric ulcer     GERD (gastroesophageal reflux disease)     Heart valve problem     bicuspic valve    Hemodialysis patient ([facility]     History of spinal fracture     work incident   Keven Lara Hx of blood clots     Bilateral lower extremities; stents in place    Hyperlipidemia     Hypertension     MI (myocardial infarction) ([address] 2019    has had 9 MIs. 2019 was the last    Neuromuscular disorder ([address]     due to CVA    Numbness and tingling in left arm     from fistula    Pneumonia     PONV (postoperative nausea and vomiting)     Prolonged emergence from general anesthesia     States requires more medication than most people    Sleep apnea     Uses CPAP    Stroke ([facility]     7mm thalamic cva 2017 deficts left side, left side weakness    TIA (transient ischemic attack)     Unspecified diseases of blood and blood-forming organs        Surgical History:    Past Surgical History:   Procedure Laterality Date    AORTIC VALVE REPLACEMENT N/A 10/15/2019    TRANSCATHETER AORTIC VALVE REPLACEMENT FEMORAL APPROACH performed by Chase Lyn MD at [address] Right 7/2/2019    PERITONEAL DIALYSIS CATHETER REMOVAL performed by William Foster MD at [place] [address] COLONOSCOPY  2/29/2015    WN    CORONARY ANGIOPLASTY WITH STENT PLACEMENT  05/26/15    CYST REMOVAL  08/14/2013    EXCISION CYSTS, NECK X2 AND ABDOMINAL benign    DIAGNOSTIC CARDIAC CATH LAB PROCEDURE      DIALYSIS FISTULA CREATION Left 10/30/2017    LEFT BRACHIAL CEPHALIC FISTULA    DIALYSIS FISTULA CREATION Left 3/27/2019    LIGATION  AV FISTULA performed by Jamir Corey MD at [address] [place] [address], COLON, DIAGNOSTIC      OTHER SURGICAL HISTORY  02/01/2017    laparoscopic cholecystectomy with intraoperative cholangiogram    OTHER SURGICAL HISTORY  2018    PORT PLACEMENT  - vas cath    OTHER SURGICAL HISTORY Bilateral 06/26/2018    laprascopic peritoneal dialysis catheter placement    OTHER SURGICAL HISTORY Right 09/2018    peritoneal dialysis port placed on right side of abdomen    OTHER SURGICAL HISTORY  05/28/2019    PTA/Stenting R External Iliac artery    VT LAP INSERTION TUNNELED INTRAPERITONEAL CATHETER N/A 9/21/2018    LAPAROSCOPIC PERITONEAL DIALYSIS CATHETER REPLACEMENT performed by Andrew Leyva MD at [address] ENDOSCOPY  01/06/2016    UPPER GASTROINTESTINAL ENDOSCOPY  01/29/2017    possible candida, otherwise normal appearing    VASCULAR SURGERY  aprx 2 years ago    2 stents placed, each side of groin             Pre-Sedation:  Pre-Sedation Documentation and Exam:  I have assessed the patient and reviewed the H&P on the chart. Prior History of Anesthesia Complications:   none    Modified Mallampati:  III (soft palate, base of uvula visible)    ASA Classification:  Class 3 - A patient with severe systemic disease that limits activity but is not incapacitating    Ernesto Scale: Activity:  2 - Able to move 4 extremities voluntarily on command  Respiration:  1 - Dyspnic, shallow, or limited breathing  Circulation:  2 - BP+/- 20mmHg of normal  Consciousness:  2 - Fully awake  Oxygen Saturation (color):  1 - Needs oxygen to maintain oxygen saturation >90%    Sedation/Anesthesia Plan:  Guard the patient's safety and welfare. Minimize physical discomfort and pain. Minimize negative psychological responses to treatment by providing sedation and analgesia and maximize the potential amnesia. Patient to meet pre-procedure discharge plan.     Medication Planned:  midazolam intravenously and fentanyl intravenously    Patient is an appropriate candidate for plan of sedation:   yes      Electronically signed by Nick Boateng MD on 1/14/2022 at 10:51 AM

## 2022-01-14 NOTE — PROGRESS NOTES
01/14/22 0428   NIV Type   Equipment Type v60   Mode Bilevel   Mask Type Full face mask   Mask Size Medium   Settings/Measurements   IPAP 16 cmH20   CPAP/EPAP 6 cmH2O   Rate Ordered 12   Resp 23   FiO2  30 %   Vt Exhaled 971 mL   Minute Volume 22.3 Liters   Mask Leak (lpm) 40 lpm   Comfort Level Good   Using Accessory Muscles No   Alarm Settings   Alarms On Y   Press Low Alarm 6 cmH2O   High Pressure Alarm 30 cmH2O   Delay Alarm 20 sec(s)   Resp Rate Low Alarm 6   High Respiratory Rate 40 br/min

## 2022-01-14 NOTE — PROGRESS NOTES
Department of Internal Medicine  Nephrology Progress Note        SUBJECTIVE:    We are following this patient for ESRD management. The patient was seen and examined; he feels well today with no CP, SOB, nausea or vomiting. ROS: No fever or chills. Social: No family at bedside. Physical Exam:    VITALS:  BP (!) 109/59   Pulse 72   Temp 97.4 °F (36.3 °C) (Axillary)   Resp 20   Ht 5' 9\" (1.753 m)   Wt 265 lb 10.5 oz (120.5 kg)   SpO2 93%   BMI 39.23 kg/m²     General appearance: Seems comfortable, no acute distress. Neck: Trachea midline, thyroid normal.   Lungs:  Non labored breathing, CTA to anterior auscultation. Heart:  S1S2 normal, rub or gallop. No peripheral edema. Abdomen: Soft, non-tender, no organomegaly. Skin: No lesions or rashes, warm to touch. DATA:    CBC:   Lab Results   Component Value Date    WBC 11.8 01/12/2022    RBC 3.48 01/12/2022    HGB 10.0 01/12/2022    HCT 30.8 01/12/2022    MCV 88.3 01/12/2022    MCH 28.6 01/12/2022    MCHC 32.4 01/12/2022    RDW 16.0 01/12/2022     01/12/2022    MPV 7.9 01/12/2022     BMP:    Lab Results   Component Value Date     01/14/2022    K 4.7 01/14/2022    K 5.2 11/29/2021    CL 94 01/14/2022    CO2 21 01/14/2022    BUN 68 01/14/2022    LABALBU 3.6 01/12/2022    CREATININE 7.2 01/14/2022    CALCIUM 8.7 01/14/2022    GFRAA 10 01/14/2022    GFRAA >60 05/17/2013    LABGLOM 8 01/14/2022    GLUCOSE 168 01/14/2022       IMPRESSION/RECOMMENDATIONS:      1- ESRD: We will arrange for hemodialysis today per [facility] schedule. 2- No significant electrolytes disorders noted, apply free water restriction. 3- HTN: Blood pressure within acceptable range. 4- Anemia: Stable hemoglobin, continue DAVON during dialysis. 5- CAD: Medical management recommended per Cardiology.

## 2022-01-14 NOTE — CONSULTS
Nutrition Education    Pt seen per [facility] for CHF diet education. Provided pt with written and verbal instruction on HF nutrition therapy. Discussed low sodium diet, daily weights, and fluid restriction. Pt states that he does not add salt to his foods and attempts to limit fluid intakes d/t dialysis. Pt does not weight himself at home, states he is weighed at dialysis. Attempted to review current diet, pt unable to provide much insight as to what he normally eats at home. Encouraged pt to continue to limit sodium and fluid intakes. Pt denied the need for further diet education at this time, accepted handouts with RD phone number. Encouraged to call with any questions. · Verbally reviewed information with Patient  · Educated on HF nutrition therapy, heart healthy consistent carbohydrate nutrition therapy   · Written educational materials provided. · Contact name and number provided. · Refer to Patient Education activity for more details.     Electronically signed by Thuy Pierson RD on 1/14/22 at 10:47 AM EST    Contact: [phone]

## 2022-01-14 NOTE — PROGRESS NOTES
Healing blisters on Right foot and 4th right toes were brought to Wound Care RN's attention, she states that Wound Care does not need to follow. Instructed to continue to keep areas clean and dry. Cleansed with Betadine swabs and ALFREDITO.

## 2022-01-14 NOTE — PROGRESS NOTES
0  [phone] Hospital or Facility: [facility] From: [place] RE: Miah Du RM: 438 Pt w/o IV access. Multiple nurses on dayshift attempted to obtain new IV, stated pt will need an Ultrasound guided IV. Central line was attempted in ED when arrived w/o success. He is supposed to be on a Heparin gtt. Do I need an order for an US guided IV? He is HD MWF, and I do not know that nephro would clear him for a central/midline.  Need Callback: NO CALLBACK REQ C4 PROGRESSIVE CARE ROUTINE    Pg sent to Darrell Keyes CNP

## 2022-01-14 NOTE — PROCEDURES
[address]       Cardiac Catheterization Lab Report    PATIENT: Amos Villareal  DATE: 2022  MRN: [id number]  CSN: [id number]  : 1970      Performing Physician: Rachael Wilkins MD, JENNIFER, [place], [place]  Primary Care Physician: Samuel Quiroga MD  Admitting/Referring provider: Vera Aldridge DO     Procedures Performed:   1. Left heart catheterization  2. Selective left and right coronary angiogram  3. PCI of the RCA with PATRICIA    Procedure Findings:  1. 99% mid RCA  2. 60-70% CIRC and DIAG  3. Elevated left heart hemodynamics   ~LVEDP 30     Indications:   Patient Active Problem List   Diagnosis    Acute respiratory failure with hypoxia and hypercapnia (HCC)    Chronic obstructive pulmonary disease (HCC)    CAD S/P percutaneous coronary angioplasty    PVD (peripheral vascular disease) ([address]    Nonischemic cardiomyopathy ([address]    Sleep apnea    Bicuspid aortic valve    Bilateral hilar adenopathy syndrome    Claudication in peripheral vascular disease ([address]    Essential hypertension    Diabetic neuropathy (HCC)    Type 2 diabetes, uncontrolled, with neuropathy ([address]    Passive smoke exposure    Depression with anxiety    Hematoma    Pneumonia of right upper lobe due to infectious organism    DM (diabetes mellitus), secondary, uncontrolled, w/neurologic complic ([address]    Hypertensive heart disease with congestive heart failure with preserved left ventricular function ([address]    CHF exacerbation ([address]    Chest pain    Coronary artery disease involving native coronary artery of native heart without angina pectoris    Obesity (BMI 30-39. 9)    ZAINAB on CPAP    Degeneration of lumbar or lumbosacral intervertebral disc    Lumbar radiculopathy    Lumbosacral spondylosis without myelopathy    Biliary colic    Symptomatic cholelithiasis    Gastroparesis due to DM (HCC)    Angina, class IV (HCC)    Dyspnea    Elevated brain natriuretic peptide (BNP) level    Dyslipidemia  Respiratory distress    Hypoxia    Chest pressure    Hypertensive urgency    Acute on chronic combined systolic and diastolic heart failure (HCC)    Ischemic cardiomyopathy    Chronic renal failure, stage 5 ([facility])    Tobacco abuse    CKD stage 4 due to type 2 diabetes mellitus ([address]    CVA (cerebral vascular accident) ([address]    Arterial ischemic stroke, ICA, right, acute ([address]    Type 2 diabetes mellitus without complication, without long-term current use of insulin ([facility])    HTN (hypertension), benign    ZAINAB (obstructive sleep apnea)    Diarrhea    Pleural effusion    Chronic anemia    Nonrheumatic aortic valve stenosis    Hypervolemia    Hyperkalemia    Obesity    Mucus plugging of bronchi    Hemodialysis-associated hypotension    Left arm pain    Dizziness    ESRD (end stage renal disease) on dialysis ([facility])    Hypotension due to drugs    Acute diastolic CHF (congestive heart failure) ([facility])    Neuromuscular disorder ([facility])    Acute combined systolic and diastolic CHF, NYHA class 4 ([facility])    Renovascular hypertension    Mixed hyperlipidemia    Cigarette nicotine dependence in remission    Acute respiratory failure ([facility])    Pulmonary edema    Fluid overload    Diastolic dysfunction    Anemia of chronic disease    SOB (shortness of breath)    Steal syndrome of dialysis vascular access ([facility])    Chronic, continuous use of opioids    Chronic bronchitis ([facility])    Nasal congestion    Hypercholesteremia    Bradycardia    S/P TAVR (transcatheter aortic valve replacement)    Syncope and collapse    Atrial fibrillation ([address]    Former smoker    Generalized weakness    DKA, type 1, not at goal [facility])    Bilateral leg weakness    GBS (Guillain-Essex syndrome) ([facility])    Sinus pause    Weakness of both lower extremities    Sinus bradycardia    Ataxia    Peripheral vascular occlusive disease ([facility])    Cellulitis of right foot    Iliac artery occlusion, right ([address]    Cellulitis and abscess of hand    Type 2 diabetes mellitus with hyperglycemia (HCC)    Acute encephalopathy    Acute hypoxemic respiratory failure (HCC)    CHF (congestive heart failure) (HCC)    Acute cerebrovascular accident (CVA) ([address]    Speech problem    Left face and left arm tingling    Urinary tract infection with hematuria    Acute CVA (cerebrovascular accident) ([address]    Alcohol abuse, daily use    CHF (congestive heart failure), NYHA class I, acute on chronic, combined ([address]    Respiratory failure with hypoxia (HCC)    Acute respiratory failure with hypercapnia (HCC)    Acute pulmonary edema (HCC)    Obesity, Class II, BMI 35-39.9    Grade II diastolic dysfunction    Shock circulatory ([address]    Smoker    DM (diabetes mellitus), secondary uncontrolled ([address]    Normocytic normochromic anemia    NSTEMI (non-ST elevated myocardial infarction) [facility]       Details:   Jazzy Montelongo was brought to the cardiac catheterization lab in a fasting state after informed consent was obtained. If the patient was able to provide written consent, it was obtained. The patient's vitals were monitored through out the procedure. The patient was sedated using the appropriate levels of sedation and ASA guidelines. The appropriate access site area was prepped and drapped in a sterile fashion. The area was anesthetized with 2% lidocaine. Using the modified Seldinger technique, an arterial sheath was introduced into the arterial access site using a single anterior wall puncture. The sheath was flushed and prepped in usual fashion. Catheters used during the procedure included 5 [place]ian TIG 4.0 catheter. The catheters were advanced and removed over a .035\" wire, into the appropriate positions. Multiple angiographic views were obtained. An LV gram was not obtained. ~The interventional portion of the procedure was completed utilizing a multipurpose 6 [place] guide.   Multipurpose guide was advanced into the right coronary ostium. A gentleman therapy aspirin, Plavix, Angiomax was initiated. A BMW wire was advanced into the distal right coronary artery. The lesion was initially predilated with a 2.75 x 15 mm Euphora balloon followed by a 3.5 mm x 20 mm Euphora balloon and subsequently stented with a resolute Willam 4.0 mm x 38 mm balloon. We did experience a no reflow phenomenon. We separately done. A twin pass catheter and then did local drug delivery with 200 mcg of nitroprusside and 200 mcgnitroglycerin. Provided restoration of SCARLETT-3 flow. Findings:    1. Left main coronary artery was normal. It gave off the left anterior descending artery and left circumflex. 2. Left anterior descending artery has 60% severe atherosclerotic disease. It was moderate in size. It gave off septal perforators and a moderate sized diagonal branch. The LAD covered the entire apex of the left ventricle. 3. Left circumflex has 60% severe atherosclerotic disease. It was moderate in size. There was a moderate sized obtuse marginal branch. 4. Right coronary artery has 99% severe atherosclerotic disease. It was moderate in size and was the dominant artery. /72   Pulse 75   Temp 97.4 °F (36.3 °C) (Oral)   Resp 22   Ht 5' 9\" (1.753 m)   Wt 265 lb 10.5 oz (120.5 kg)   SpO2 98%   BMI 39.23 kg/m²     The access site was controlled with manual pressure and/or appropriate closure device. Moderate Conscious Sedation Details: An independent trained observer pushed medications at my direction. We monitoring the patient's level of consciousness and vital signs/physiologic status throughout the procedure. CPT codes 20778 and 22070    Start time: 1107  Stop time: 1207  ASA class: 3    Sedation totals:  Versad - 1.5mg  Fentanyl - 100mcg    EBL - minimal <5 cc blood loss    The patient was monitored continuously with the ECG, pulse oximetry, blood pressure, and direct observation. CONCLUSIONS:    1. Apercutaneous coronary intervention of the right coronary artery utilizing a single resolute Willam drug-eluting stent    ASSESSMENT/RECOMMENDATIONS:    1. Dual antiplatelet therapy  2.   Medical management of the remaining coronary disease      Je Le MD, JENNIFER, [facility]  Interventional Cardiology  [facility]  [phone] Main central office  [phone] Carolee Sutton office  1/14/2022  12:15 PM

## 2022-01-14 NOTE — PROGRESS NOTES
01/13/22 2035   NIV Type   Skin Protection for O2 Device Yes   NIV Started/Stopped On   Equipment Type v60   Mode Bilevel   Mask Type Full face mask   Settings/Measurements   IPAP 16 cmH20   CPAP/EPAP 6 cmH2O   Rate Ordered 12   Resp 19   I Time/ I Time % 0.9 s   Vt Exhaled 772 mL   Minute Volume 13 Liters   Mask Leak (lpm) 45 lpm   Comfort Level Good

## 2022-01-15 LAB
ANION GAP SERPL CALCULATED.3IONS-SCNC: 20 MMOL/L (ref 3–16)
BUN BLDV-MCNC: 38 MG/DL (ref 7–20)
CALCIUM SERPL-MCNC: 8.4 MG/DL (ref 8.3–10.6)
CHLORIDE BLD-SCNC: 93 MMOL/L (ref 99–110)
CO2: 23 MMOL/L (ref 21–32)
CREAT SERPL-MCNC: 5.2 MG/DL (ref 0.9–1.3)
EKG ATRIAL RATE: 69 BPM
EKG DIAGNOSIS: NORMAL
EKG P AXIS: -15 DEGREES
EKG P-R INTERVAL: 180 MS
EKG Q-T INTERVAL: 430 MS
EKG QRS DURATION: 96 MS
EKG QTC CALCULATION (BAZETT): 460 MS
EKG R AXIS: -5 DEGREES
EKG T AXIS: 173 DEGREES
EKG VENTRICULAR RATE: 69 BPM
GFR AFRICAN AMERICAN: 14
GFR NON-AFRICAN AMERICAN: 12
GLUCOSE BLD-MCNC: 149 MG/DL (ref 70–99)
GLUCOSE BLD-MCNC: 150 MG/DL (ref 70–99)
GLUCOSE BLD-MCNC: 183 MG/DL (ref 70–99)
GLUCOSE BLD-MCNC: 197 MG/DL (ref 70–99)
GLUCOSE BLD-MCNC: 251 MG/DL (ref 70–99)
HCT VFR BLD CALC: 26.2 % (ref 40.5–52.5)
HEMOGLOBIN: 8.6 G/DL (ref 13.5–17.5)
MAGNESIUM: 2 MG/DL (ref 1.8–2.4)
MCH RBC QN AUTO: 29.4 PG (ref 26–34)
MCHC RBC AUTO-ENTMCNC: 33 G/DL (ref 31–36)
MCV RBC AUTO: 89.1 FL (ref 80–100)
PDW BLD-RTO: 16.1 % (ref 12.4–15.4)
PERFORMED ON: ABNORMAL
PLATELET # BLD: 256 K/UL (ref 135–450)
PMV BLD AUTO: 7.9 FL (ref 5–10.5)
POTASSIUM SERPL-SCNC: 4.2 MMOL/L (ref 3.5–5.1)
RBC # BLD: 2.95 M/UL (ref 4.2–5.9)
SODIUM BLD-SCNC: 136 MMOL/L (ref 136–145)
WBC # BLD: 8.3 K/UL (ref 4–11)

## 2022-01-15 PROCEDURE — 93010 ELECTROCARDIOGRAM REPORT: CPT | Performed by: INTERNAL MEDICINE

## 2022-01-15 PROCEDURE — 85027 COMPLETE CBC AUTOMATED: CPT

## 2022-01-15 PROCEDURE — 94761 N-INVAS EAR/PLS OXIMETRY MLT: CPT

## 2022-01-15 PROCEDURE — 2700000000 HC OXYGEN THERAPY PER DAY

## 2022-01-15 PROCEDURE — 6370000000 HC RX 637 (ALT 250 FOR IP): Performed by: INTERNAL MEDICINE

## 2022-01-15 PROCEDURE — 99232 SBSQ HOSP IP/OBS MODERATE 35: CPT | Performed by: NURSE PRACTITIONER

## 2022-01-15 PROCEDURE — 83735 ASSAY OF MAGNESIUM: CPT

## 2022-01-15 PROCEDURE — 94640 AIRWAY INHALATION TREATMENT: CPT

## 2022-01-15 PROCEDURE — 6360000002 HC RX W HCPCS: Performed by: INTERNAL MEDICINE

## 2022-01-15 PROCEDURE — 36415 COLL VENOUS BLD VENIPUNCTURE: CPT

## 2022-01-15 PROCEDURE — 2060000000 HC ICU INTERMEDIATE R&B

## 2022-01-15 PROCEDURE — 94660 CPAP INITIATION&MGMT: CPT

## 2022-01-15 PROCEDURE — 6370000000 HC RX 637 (ALT 250 FOR IP): Performed by: NURSE PRACTITIONER

## 2022-01-15 PROCEDURE — 93005 ELECTROCARDIOGRAM TRACING: CPT | Performed by: INTERNAL MEDICINE

## 2022-01-15 PROCEDURE — 80048 BASIC METABOLIC PNL TOTAL CA: CPT

## 2022-01-15 RX ORDER — OXYCODONE AND ACETAMINOPHEN 7.5; 325 MG/1; MG/1
1 TABLET ORAL EVERY 8 HOURS PRN
Status: DISCONTINUED | OUTPATIENT
Start: 2022-01-15 | End: 2022-01-17 | Stop reason: HOSPADM

## 2022-01-15 RX ORDER — ASPIRIN 81 MG/1
81 TABLET, CHEWABLE ORAL DAILY
Status: DISCONTINUED | OUTPATIENT
Start: 2022-01-15 | End: 2022-01-17 | Stop reason: HOSPADM

## 2022-01-15 RX ADMIN — PRAVASTATIN SODIUM 40 MG: 40 TABLET ORAL at 20:25

## 2022-01-15 RX ADMIN — DULOXETINE HYDROCHLORIDE 60 MG: 60 CAPSULE, DELAYED RELEASE ORAL at 09:10

## 2022-01-15 RX ADMIN — LISINOPRIL 5 MG: 5 TABLET ORAL at 09:10

## 2022-01-15 RX ADMIN — FUROSEMIDE 40 MG: 10 INJECTION, SOLUTION INTRAMUSCULAR; INTRAVENOUS at 09:10

## 2022-01-15 RX ADMIN — CLOPIDOGREL 75 MG: 75 TABLET, FILM COATED ORAL at 09:10

## 2022-01-15 RX ADMIN — ASPIRIN 81 MG 81 MG: 81 TABLET ORAL at 09:10

## 2022-01-15 RX ADMIN — INSULIN LISPRO 3 UNITS: 100 INJECTION, SOLUTION INTRAVENOUS; SUBCUTANEOUS at 13:15

## 2022-01-15 RX ADMIN — INSULIN LISPRO 1 UNITS: 100 INJECTION, SOLUTION INTRAVENOUS; SUBCUTANEOUS at 20:25

## 2022-01-15 RX ADMIN — INSULIN GLARGINE 25 UNITS: 100 INJECTION, SOLUTION SUBCUTANEOUS at 20:25

## 2022-01-15 RX ADMIN — INSULIN LISPRO 1 UNITS: 100 INJECTION, SOLUTION INTRAVENOUS; SUBCUTANEOUS at 18:28

## 2022-01-15 RX ADMIN — QUETIAPINE FUMARATE 50 MG: 25 TABLET ORAL at 20:25

## 2022-01-15 RX ADMIN — TIOTROPIUM BROMIDE AND OLODATEROL 2 PUFF: 3.124; 2.736 SPRAY, METERED RESPIRATORY (INHALATION) at 08:26

## 2022-01-15 RX ADMIN — FUROSEMIDE 40 MG: 10 INJECTION, SOLUTION INTRAMUSCULAR; INTRAVENOUS at 18:39

## 2022-01-15 RX ADMIN — OXYCODONE AND ACETAMINOPHEN 1 TABLET: 7.5; 325 TABLET ORAL at 20:30

## 2022-01-15 RX ADMIN — METOPROLOL SUCCINATE 50 MG: 50 TABLET, EXTENDED RELEASE ORAL at 09:10

## 2022-01-15 RX ADMIN — PANTOPRAZOLE SODIUM 40 MG: 40 TABLET, DELAYED RELEASE ORAL at 09:10

## 2022-01-15 ASSESSMENT — PAIN DESCRIPTION - LOCATION: LOCATION: BACK

## 2022-01-15 ASSESSMENT — PAIN DESCRIPTION - PAIN TYPE: TYPE: CHRONIC PAIN

## 2022-01-15 ASSESSMENT — PAIN SCALES - GENERAL
PAINLEVEL_OUTOF10: 9
PAINLEVEL_OUTOF10: 0
PAINLEVEL_OUTOF10: 6
PAINLEVEL_OUTOF10: 0

## 2022-01-15 ASSESSMENT — ENCOUNTER SYMPTOMS
SHORTNESS OF BREATH: 1
GASTROINTESTINAL NEGATIVE: 1

## 2022-01-15 NOTE — PROGRESS NOTES
01/15/22 0335   NIV Type   Equipment Type v60   Mode Bilevel   Mask Type Full face mask   Mask Size Medium   Settings/Measurements   IPAP 16 cmH20   CPAP/EPAP 6 cmH2O   Rate Ordered 12   Resp 16   FiO2  30 %   Vt Exhaled 623 mL   Minute Volume 9.8 Liters   Comfort Level Good   Using Accessory Muscles No   SpO2 97   Alarm Settings   Alarms On Y   Press Low Alarm 6 cmH2O   High Pressure Alarm 30 cmH2O   Apnea (secs) 20 secs   Resp Rate Low Alarm 6   High Respiratory Rate 40 br/min

## 2022-01-15 NOTE — PROGRESS NOTES
[address]  Cardiology  Progress Note    Admission date:  2022    Reason for follow up visit: NSTEMI, CAD    HPI/CC: Jazzy Montelongo is a 46 y.o. male who was admitted 2022 for shortness of breath. Troponin elevated. Echo showed EF 40-45%. Stress test abnormal. LHC showed significant RCA treated with PATRICIA. Rhythm overnight has been sinus. Subjective: Ongoing chest discomfort and shortness of breath, unchanged since admission. Vitals:  Blood pressure 125/79, pulse 68, temperature 98 °F (36.7 °C), temperature source Axillary, resp. rate 14, height 5' 9\" (1.753 m), weight 264 lb 15.9 oz (120.2 kg), SpO2 97 %.   Temp  Av.6 °F (36.4 °C)  Min: 96.7 °F (35.9 °C)  Max: 98.2 °F (36.8 °C)  Pulse  Av.4  Min: 54  Max: 81  BP  Min: 64/50  Max: 125/79  SpO2  Av.5 %  Min: 93 %  Max: 98 %  FiO2   Av %  Min: 30 %  Max: 30 %    24 hour I/O    Intake/Output Summary (Last 24 hours) at 1/15/2022 0706  Last data filed at 2022 1842  Gross per 24 hour   Intake 490 ml   Output 2750 ml   Net -2260 ml     Current Facility-Administered Medications   Medication Dose Route Frequency Provider Last Rate Last Admin    furosemide (LASIX) injection 40 mg  40 mg IntraVENous BID Toro Rolon MD   40 mg at 22 0957    prochlorperazine (COMPAZINE) injection 5 mg  5 mg IntraVENous Q6H PRN Saritha Adam MD        insulin lispro (HUMALOG) injection vial 0-6 Units  0-6 Units SubCUTAneous 4x Daily AC & HS Toro Rolon MD   3 Units at 22    iopamidol (ISOVUE-370) 76 % injection 75 mL  75 mL IntraVENous ONCE PRN Toro Rolno MD        aspirin EC tablet 162 mg  162 mg Oral Once Toro Rolon MD        albuterol (PROVENTIL) nebulizer solution 2.5 mg  2.5 mg Nebulization Q4H PRN Toro Rolon MD        clopidogrel (PLAVIX) tablet 75 mg  75 mg Oral Daily Toro Rolon MD   75 mg at 22 1010    DULoxetine (CYMBALTA) extended release capsule 60 mg  60 mg Oral Daily Gerber Patrick MD   60 mg at 01/14/22 1010    pantoprazole (PROTONIX) tablet 40 mg  40 mg Oral QAM AC Gerber Patrick MD   40 mg at 01/12/22 7068    pravastatin (PRAVACHOL) tablet 40 mg  40 mg Oral Nightly Gerber Patrick MD   40 mg at 01/14/22 2042    tiotropium-olodaterol (STIOLTO) 2.5-2.5 MCG/ACT inhaler 2 puff  2 puff Inhalation Daily Gerber Patrick MD   2 puff at 01/14/22 0807    heparin (porcine) injection 4,000 Units  4,000 Units IntraVENous PRN Gerber Patrick MD        heparin (porcine) injection 2,000 Units  2,000 Units IntraVENous PRN Gerber Patrick MD        albuterol sulfate  (90 Base) MCG/ACT inhaler 2 puff  2 puff Inhalation Q6H PRN Gerber Patrick MD        famotidine (PEPCID) tablet 20 mg  20 mg Oral Once per day on Mon Wed Fri Tahira Herbert MD   20 mg at 01/12/22 1146    linaclotide (LINZESS) capsule 145 mcg  145 mcg Oral QAM AC Gerber Patrick MD        QUEtiapine (SEROQUEL) tablet 50 mg  50 mg Oral Nightly Gerber Patrick MD   50 mg at 01/14/22 2042    traZODone (DESYREL) tablet 150 mg  150 mg Oral Nightly PRN Gerber Patrick MD   150 mg at 01/12/22 2057    labetalol (NORMODYNE;TRANDATE) injection 10 mg  10 mg IntraVENous Q4H PRN Gerber Patrick MD   10 mg at 01/12/22 0444    glucose (GLUTOSE) 40 % oral gel 15 g  15 g Oral PRN Gerber Patrick MD        dextrose 50 % IV solution  12.5 g IntraVENous PRN Gerber Patrick MD        glucagon (rDNA) injection 1 mg  1 mg IntraMUSCular PRN Gerber Patrick MD        dextrose 5 % solution  100 mL/hr IntraVENous PRN Gerber Patrick MD        heparin 25,000 units in dextrose 5% 250 mL (premix) infusion  2,060 Units/hr IntraVENous Continuous Gerber Patrick MD   Stopped at 01/14/22 1107    heparin (porcine) injection 4,700 Units  4,700 Units IntraCATHeter PRN Gerber Patrick MD   4,700 Units at 01/12/22 1251    insulin glargine (LANTUS) injection vial 25 Units  25 Units SubCUTAneous Nightly Hakan Mckeon MD   25 Units at 01/14/22 2043    metoprolol succinate (TOPROL XL) extended release tablet 50 mg  50 mg Oral Daily Hakan Mckeon MD   50 mg at 01/14/22 1011    lisinopril (PRINIVIL;ZESTRIL) tablet 5 mg  5 mg Oral Daily Hakan Mckeon MD   5 mg at 01/14/22 1011     Review of Systems   Respiratory: Positive for shortness of breath. Cardiovascular: Positive for chest pain. Gastrointestinal: Negative. Neurological: Negative. Objective:     Telemetry monitor: SR    Physical Exam:  Constitutional:  Comfortable and alert, NAD, appears older than stated age, obese  Eyes: PERRL, sclera nonicteric  Neck:  Supple, no masses, no thyroidmegaly, JVD difficult to assess  Skin:  Warm and dry; no rash or lesions  Heart: Regular, normal apex, S1 and S2 normal, no M/G/R  Lungs:  Normal respiratory effort; diminished  Abdomen: soft, non tender, + bowel sounds  Extremities:  +BLE edema  Neuro: alert and oriented, moves legs and arms equally, normal mood and affect  Right radial site soft, no hematoma, 2+ pulse, very tender    Data Reviewed:    Coronary angiogram 1/14/2022:  1. Left heart catheterization  2. Selective left and right coronary angiogram  3. PCI of the RCA with PATRICIA  Procedure Findings:  1. 99% mid RCA  2. 60-70% CIRC and DIAG  3. Elevated left heart hemodynamics              ~LVEDP 30       Details:              Renetta Sibley was brought to the cardiac catheterization lab in a fasting state after informed consent was obtained. If the patient was able to provide written consent, it was obtained. The patient's vitals were monitored through out the procedure. The patient was sedated using the appropriate levels of sedation and ASA guidelines. The appropriate access site area was prepped and drapped in a sterile fashion. The area was anesthetized with 2% lidocaine.  Using the modified Seldinger technique, an arterial sheath was introduced into the arterial access site using a single anterior wall puncture. The sheath was flushed and prepped in usual fashion. Catheters used during the procedure included 5 Italian TIG 4.0 catheter. The catheters were advanced and removed over a .035\" wire, into the appropriate positions. Multiple angiographic views were obtained. An LV gram was not obtained. ~The interventional portion of the procedure was completed utilizing a multipurpose 6 [place] guide. Multipurpose guide was advanced into the right coronary ostium. A gentleman therapy aspirin, Plavix, Angiomax was initiated. A BMW wire was advanced into the distal right coronary artery. The lesion was initially predilated with a 2.75 x 15 mm Euphora balloon followed by a 3.5 mm x 20 mm Euphora balloon and subsequently stented with a resolute [place] 4.0 mm x 38 mm balloon. We did experience a no reflow phenomenon. We separately done. A twin pass catheter and then did local drug delivery with 200 mcg of nitroprusside and 200 mcgnitroglycerin. Provided restoration of SCARLETT-3 flow. Findings:  1. Left main coronary artery was normal. It gave off the left anterior descending artery and left circumflex. 2. Left anterior descending artery has 60% severe atherosclerotic disease. It was moderate in size. It gave off septal perforators and a moderate sized diagonal branch. The LAD covered the entire apex of the left ventricle. 3. Left circumflex has 60% severe atherosclerotic disease. It was moderate in size. There was a moderate sized obtuse marginal branch. 4. Right coronary artery has 99% severe atherosclerotic disease. It was moderate in size and was the dominant artery. CONCLUSIONS:  1. Apercutaneous coronary intervention of the right coronary artery utilizing a single resolute [place] drug-eluting stent  ASSESSMENT/RECOMMENDATIONS:  1. Dual antiplatelet therapy  2.   Medical management of the remaining coronary disease    Echo 1/2022: Limited only f/u for LVEF and RVF. The left ventricular systolic function is mildly reduced with an ejection   fraction of 40-45 %. There is hypokinesis of the apex and inferior walls. There is a very small circumferential pericardial effusion noted. No tamponade physiology. The right ventricle is normal in size and function. ABIs 6/8/2020:  DESTINEY's indicate mild bilateral lower extremity arterial disease.     <50% stenoses in the bilateral common, superficial femoral and popliteal     arteries.  Tibial vessels not well visualized.       Echo 7/9/2020:  Low Normal systolic function with an estimated ejection fraction of 50%.   Left ventricular function and wall motion is difficult to estimate due to   poor endocardial visualization.   Grade I diastolic dysfunction with normal filing pressure.   Normally functioning TAVR 29 mm Langford Leyda S3 bioprosthetic valve in   aortic position appears well seated with a max velocity of 2.11 m/sec,   maximum gradient of 18 mmHg and a mean gradient of 8 mmHg.   There is no evidence of aortic regurgitation.     Coronary angiogram 8/22/2019:  LM <20%  LAD patent stent  Cx <20%  RCA 30%  Severe AS by echo  Proceed w/ TAVR    Lab Reviewed:     Renal Profile:  Lab Results   Component Value Date    CREATININE 5.2 01/15/2022    BUN 38 01/15/2022     01/15/2022    K 4.2 01/15/2022    K 5.2 11/29/2021    CL 93 01/15/2022    CO2 23 01/15/2022     CBC:    Lab Results   Component Value Date    WBC 8.3 01/15/2022    RBC 2.95 01/15/2022    HGB 8.6 01/15/2022    HCT 26.2 01/15/2022    MCV 89.1 01/15/2022    RDW 16.1 01/15/2022     01/15/2022     BNP:    Lab Results   Component Value Date    PROBNP 57,050 01/12/2022    PROBNP 34,022 12/02/2021    PROBNP 39,503 11/29/2021    PROBNP 17,779 10/15/2021    PROBNP 47,009 09/01/2021     Fasting Lipid Panel:    Lab Results   Component Value Date    CHOL 251 11/30/2021    HDL 53 11/30/2021    TRIG 214 11/30/2021     Cardiac Enzymes:  CK/MbTroponin  Lab Results   Component Value Date    CKTOTAL 167 06/05/2020    TROPONINI 0.39 01/12/2022     PT/ INR   Lab Results   Component Value Date    INR 0.98 10/11/2021    INR 0.95 07/27/2021    INR 1.00 12/07/2020    PROTIME 11.1 10/11/2021    PROTIME 10.7 07/27/2021    PROTIME 11.6 12/07/2020     PTT No results found for: PTT   Lab Results   Component Value Date    MG 2.00 01/15/2022      Lab Results   Component Value Date    TSH 1.17 11/30/2021     All labs and imaging reviewed today    Assessment:  Shortness of breath: no significant change  NSTEMI  CAD: s/p PATRICIA RCA 1/14/2022; s/p PATRICIA LAD 2015  Acute on chronic systolic CHF: ongoing  Bicuspid AV/severe AS: s/p TAVR 10/2019  Ischemic cardiomyopathy: EF 40-45% on echo 1/12/2022  PVD: s/p PTA/stent R external iliac 5/2019  Bradycardia/transient sinus pauses: noted 12/12/2020, no recurrence  HTN: stable  HLD: uncontrolled, , statin and recheck  ESRD: on HD MWF; follows with Dr. Emily Rodriguez  DM: uncontrolled, hgb a1c 9.4  Anemia  ZAINAB  History of CVA    Plan:   1. Add aspirin 81 mg daily  2. Right radial ultrasound due to pain post cath  3. Attempting IV diuretics (LVEDP 30 on 1/14/2022) however fluid removal with dialysis likely to be most effective  4.  Continue plavix, toprol, lisinopril, statin    Alvah Kayser, APRN-CNP  [facility]  [phone]

## 2022-01-15 NOTE — FLOWSHEET NOTE
01/14/22 1535 01/14/22 1842   Vital Signs   /78 121/64   Temp 96.7 °F (35.9 °C) 98 °F (36.7 °C)   Pulse 54 72   Resp 18 18   Weight 270 lb 8.1 oz (122.7 kg) 264 lb 15.9 oz (120.2 kg)   Dry Weight 255 lb 11.7 oz (116 kg)  --    Post-Hemodialysis Assessment   NET Removed (ml)  --  2000 ml   Treatment time: 3 hours  Net UF: 2000 ml    Pre weight: 122.7 kg   Post weight: 120.2 kg  EDW: 116 kg    Access used: RILAURA  Access function: good with  ml/min    Medications or blood products given: albumin    Regular outpatient schedule: MWF    Summary of response to treatment: good    Copy of dialysis treatment record placed in chart, to be scanned into EMR.

## 2022-01-15 NOTE — PROGRESS NOTES
Department of Internal Medicine  Nephrology Progress Note        SUBJECTIVE:    We are following this patient for ESRD management. The patient was seen and examined; he feels well today with no CP, SOB, nausea or vomiting. ROS: No fever or chills. Social: No family at bedside. Physical Exam:    VITALS:  /70   Pulse 78   Temp 98.6 °F (37 °C) (Oral)   Resp 18   Ht 5' 9\" (1.753 m)   Wt 264 lb 15.9 oz (120.2 kg)   SpO2 97%   BMI 39.13 kg/m²     General appearance: Seems comfortable, no acute distress. Neck: Trachea midline, thyroid normal.   Lungs:  Non labored breathing, CTA to anterior auscultation. Heart:  S1S2 normal, rub or gallop. No peripheral edema. Abdomen: Soft, non-tender, no organomegaly. Skin: No lesions or rashes, warm to touch. DATA:    CBC:   Lab Results   Component Value Date    WBC 8.3 01/15/2022    RBC 2.95 01/15/2022    HGB 8.6 01/15/2022    HCT 26.2 01/15/2022    MCV 89.1 01/15/2022    MCH 29.4 01/15/2022    MCHC 33.0 01/15/2022    RDW 16.1 01/15/2022     01/15/2022    MPV 7.9 01/15/2022     BMP:    Lab Results   Component Value Date     01/15/2022    K 4.2 01/15/2022    K 5.2 11/29/2021    CL 93 01/15/2022    CO2 23 01/15/2022    BUN 38 01/15/2022    LABALBU 3.6 01/12/2022    CREATININE 5.2 01/15/2022    CALCIUM 8.4 01/15/2022    GFRAA 14 01/15/2022    GFRAA >60 05/17/2013    LABGLOM 12 01/15/2022    GLUCOSE 149 01/15/2022       IMPRESSION/RECOMMENDATIONS:      1- ESRD: We will continue hemodialysis per [facility] schedule. 2- No significant electrolytes disorders noted. 3- HTN: Blood pressure within acceptable range. 4- Anemia: Stable hemoglobin, continue DAVON during dialysis. 5- CAD: Management per Cardiology.

## 2022-01-15 NOTE — PROGRESS NOTES
01/15/22 0002   NIV Type   $NIV $Daily Charge   Skin Assessment Clean, dry, & intact   Skin Protection for O2 Device No  (pt refused mepilex)   Equipment Type v60   Mode Bilevel   Mask Type Full face mask   Mask Size Medium   Settings/Measurements   IPAP 16 cmH20   CPAP/EPAP 6 cmH2O   Rate Ordered 12   Resp 17   FiO2  30 %   Vt Exhaled 540 mL   Minute Volume 9 Liters   Comfort Level Good   Using Accessory Muscles No   SpO2 98   Alarm Settings   Alarms On Y   Press Low Alarm 6 cmH2O   High Pressure Alarm 30 cmH2O   Apnea (secs) 20 secs   Resp Rate Low Alarm 6   High Respiratory Rate 40 br/min

## 2022-01-16 LAB
ANION GAP SERPL CALCULATED.3IONS-SCNC: 14 MMOL/L (ref 3–16)
BUN BLDV-MCNC: 52 MG/DL (ref 7–20)
CALCIUM SERPL-MCNC: 8.6 MG/DL (ref 8.3–10.6)
CHLORIDE BLD-SCNC: 93 MMOL/L (ref 99–110)
CO2: 23 MMOL/L (ref 21–32)
CREAT SERPL-MCNC: 6.9 MG/DL (ref 0.9–1.3)
GFR AFRICAN AMERICAN: 10
GFR NON-AFRICAN AMERICAN: 8
GLUCOSE BLD-MCNC: 106 MG/DL (ref 70–99)
GLUCOSE BLD-MCNC: 127 MG/DL (ref 70–99)
GLUCOSE BLD-MCNC: 136 MG/DL (ref 70–99)
GLUCOSE BLD-MCNC: 205 MG/DL (ref 70–99)
GLUCOSE BLD-MCNC: 78 MG/DL (ref 70–99)
MAGNESIUM: 2 MG/DL (ref 1.8–2.4)
PERFORMED ON: ABNORMAL
POTASSIUM SERPL-SCNC: 4.4 MMOL/L (ref 3.5–5.1)
PRO-BNP: ABNORMAL PG/ML (ref 0–124)
SODIUM BLD-SCNC: 130 MMOL/L (ref 136–145)

## 2022-01-16 PROCEDURE — 83735 ASSAY OF MAGNESIUM: CPT

## 2022-01-16 PROCEDURE — 6370000000 HC RX 637 (ALT 250 FOR IP): Performed by: INTERNAL MEDICINE

## 2022-01-16 PROCEDURE — 80048 BASIC METABOLIC PNL TOTAL CA: CPT

## 2022-01-16 PROCEDURE — 2060000000 HC ICU INTERMEDIATE R&B

## 2022-01-16 PROCEDURE — 83880 ASSAY OF NATRIURETIC PEPTIDE: CPT

## 2022-01-16 PROCEDURE — 6370000000 HC RX 637 (ALT 250 FOR IP): Performed by: NURSE PRACTITIONER

## 2022-01-16 PROCEDURE — 94660 CPAP INITIATION&MGMT: CPT

## 2022-01-16 PROCEDURE — 36415 COLL VENOUS BLD VENIPUNCTURE: CPT

## 2022-01-16 PROCEDURE — 2700000000 HC OXYGEN THERAPY PER DAY

## 2022-01-16 PROCEDURE — 6360000002 HC RX W HCPCS: Performed by: INTERNAL MEDICINE

## 2022-01-16 PROCEDURE — 99232 SBSQ HOSP IP/OBS MODERATE 35: CPT | Performed by: NURSE PRACTITIONER

## 2022-01-16 PROCEDURE — 94640 AIRWAY INHALATION TREATMENT: CPT

## 2022-01-16 PROCEDURE — 94761 N-INVAS EAR/PLS OXIMETRY MLT: CPT

## 2022-01-16 RX ADMIN — PANTOPRAZOLE SODIUM 40 MG: 40 TABLET, DELAYED RELEASE ORAL at 08:21

## 2022-01-16 RX ADMIN — INSULIN LISPRO 2 UNITS: 100 INJECTION, SOLUTION INTRAVENOUS; SUBCUTANEOUS at 21:08

## 2022-01-16 RX ADMIN — METOPROLOL SUCCINATE 50 MG: 50 TABLET, EXTENDED RELEASE ORAL at 08:21

## 2022-01-16 RX ADMIN — DULOXETINE HYDROCHLORIDE 60 MG: 60 CAPSULE, DELAYED RELEASE ORAL at 08:21

## 2022-01-16 RX ADMIN — ASPIRIN 81 MG 81 MG: 81 TABLET ORAL at 08:21

## 2022-01-16 RX ADMIN — LISINOPRIL 5 MG: 5 TABLET ORAL at 08:21

## 2022-01-16 RX ADMIN — INSULIN GLARGINE 25 UNITS: 100 INJECTION, SOLUTION SUBCUTANEOUS at 21:09

## 2022-01-16 RX ADMIN — TIOTROPIUM BROMIDE AND OLODATEROL 2 PUFF: 3.124; 2.736 SPRAY, METERED RESPIRATORY (INHALATION) at 08:27

## 2022-01-16 RX ADMIN — TRAZODONE HYDROCHLORIDE 150 MG: 50 TABLET ORAL at 16:24

## 2022-01-16 RX ADMIN — OXYCODONE AND ACETAMINOPHEN 1 TABLET: 7.5; 325 TABLET ORAL at 16:24

## 2022-01-16 RX ADMIN — QUETIAPINE FUMARATE 50 MG: 25 TABLET ORAL at 21:07

## 2022-01-16 RX ADMIN — FUROSEMIDE 40 MG: 10 INJECTION, SOLUTION INTRAMUSCULAR; INTRAVENOUS at 08:21

## 2022-01-16 RX ADMIN — CLOPIDOGREL 75 MG: 75 TABLET, FILM COATED ORAL at 08:21

## 2022-01-16 RX ADMIN — PRAVASTATIN SODIUM 40 MG: 40 TABLET ORAL at 21:07

## 2022-01-16 ASSESSMENT — PAIN DESCRIPTION - PROGRESSION: CLINICAL_PROGRESSION: NOT CHANGED

## 2022-01-16 ASSESSMENT — PAIN SCALES - GENERAL
PAINLEVEL_OUTOF10: 0
PAINLEVEL_OUTOF10: 10
PAINLEVEL_OUTOF10: 8
PAINLEVEL_OUTOF10: 5
PAINLEVEL_OUTOF10: 0
PAINLEVEL_OUTOF10: 0

## 2022-01-16 ASSESSMENT — PAIN DESCRIPTION - PAIN TYPE: TYPE: ACUTE PAIN

## 2022-01-16 ASSESSMENT — ENCOUNTER SYMPTOMS
GASTROINTESTINAL NEGATIVE: 1
SHORTNESS OF BREATH: 1

## 2022-01-16 ASSESSMENT — PAIN DESCRIPTION - ONSET: ONSET: ON-GOING

## 2022-01-16 ASSESSMENT — PAIN DESCRIPTION - ORIENTATION: ORIENTATION: LOWER

## 2022-01-16 ASSESSMENT — PAIN DESCRIPTION - FREQUENCY: FREQUENCY: CONTINUOUS

## 2022-01-16 ASSESSMENT — PAIN - FUNCTIONAL ASSESSMENT: PAIN_FUNCTIONAL_ASSESSMENT: ACTIVITIES ARE NOT PREVENTED

## 2022-01-16 ASSESSMENT — PAIN DESCRIPTION - DESCRIPTORS: DESCRIPTORS: ACHING;DISCOMFORT

## 2022-01-16 ASSESSMENT — PAIN DESCRIPTION - LOCATION: LOCATION: BACK

## 2022-01-16 NOTE — PROGRESS NOTES
Hospitalist Progress Note      PCP: Guanaco Lindquist MD    Date of Admission: 1/12/2022    Chief Complaint: Shortness of breath    Hospital Course:     Subjective: Patient denies any chest pain complains of some shortness of breath, on hemodialysis for 2 years      Medications:  Reviewed    Infusion Medications    dextrose      heparin (porcine) Stopped (01/14/22 1107)     Scheduled Medications    aspirin  81 mg Oral Daily    insulin lispro  0-6 Units SubCUTAneous 4x Daily AC & HS    aspirin  162 mg Oral Once    clopidogrel  75 mg Oral Daily    DULoxetine  60 mg Oral Daily    pantoprazole  40 mg Oral QAM AC    pravastatin  40 mg Oral Nightly    tiotropium-olodaterol  2 puff Inhalation Daily    famotidine  20 mg Oral Once per day on Mon Wed Fri    linaclotide  145 mcg Oral QAM AC    QUEtiapine  50 mg Oral Nightly    insulin glargine  25 Units SubCUTAneous Nightly    metoprolol succinate  50 mg Oral Daily    lisinopril  5 mg Oral Daily     PRN Meds: oxyCODONE-acetaminophen, prochlorperazine, iopamidol, albuterol, heparin (porcine), heparin (porcine), albuterol sulfate HFA, traZODone, labetalol, glucose, dextrose, glucagon (rDNA), dextrose, heparin (porcine)      Intake/Output Summary (Last 24 hours) at 1/16/2022 0912  Last data filed at 1/15/2022 1523  Gross per 24 hour   Intake 120 ml   Output --   Net 120 ml       Physical Exam Performed:    /69   Pulse 71   Temp 97.5 °F (36.4 °C) (Oral)   Resp 18   Ht 5' 9\" (1.753 m)   Wt 264 lb 15.9 oz (120.2 kg)   SpO2 99%   BMI 39.13 kg/m²     General appearance: No apparent distress, appears stated age and cooperative. HEENT: Pupils equal, round, and reactive to light. Conjunctivae/corneas clear. Neck: Supple, with full range of motion. No jugular venous distention. Trachea midline. Respiratory:  Normal respiratory effort. Clear to auscultation, decreased breath sounds.   Cardiovascular: Regular rate and rhythm with normal S1/S2 without murmurs, rubs or gallops. Abdomen: Soft, non-tender, non-distended with normal bowel sounds. Musculoskeletal: No clubbing, cyanosis or edema bilaterally. Full range of motion without deformity. Skin: Skin color, texture, turgor normal.  No rashes or lesions. Multiple tattoos. Neurologic:  Neurovascularly intact without any focal sensory/motor deficits. Cranial nerves: II-XII intact, grossly non-focal.  Psychiatric: Alert and oriented, thought content appropriate, normal insight  Capillary Refill: Brisk,3 seconds, normal   Peripheral Pulses: +2 palpable, equal bilaterally       Labs:   Recent Labs     01/15/22  0519   WBC 8.3   HGB 8.6*   HCT 26.2*        Recent Labs     01/14/22  0511 01/15/22  0519 01/16/22  0521   * 136 130*   K 4.7 4.2 4.4   CL 94* 93* 93*   CO2 21 23 23   BUN 68* 38* 52*   CREATININE 7.2* 5.2* 6.9*   CALCIUM 8.7 8.4 8.6     No results for input(s): AST, ALT, BILIDIR, BILITOT, ALKPHOS in the last 72 hours. No results for input(s): INR in the last 72 hours. No results for input(s): Skippy Gault in the last 72 hours. Urinalysis:      Lab Results   Component Value Date    NITRU Negative 09/07/2021    WBCUA 10-20 09/07/2021    BACTERIA 1+ 09/07/2021    RBCUA 3-4 09/07/2021    BLOODU TRACE-LYSED 09/07/2021    SPECGRAV 1.015 09/07/2021    GLUCOSEU 100 09/07/2021       Radiology:  NM Cardiac Stress Test Nuclear Imaging   Final Result      XR CHEST PORTABLE   Final Result   Portable study is limited by body habitus. Lungs are grossly clear. Status post TAVR. Left IJ tunneled dialysis catheter is in good position. VL DUP UPPER EXTREMITY ARTERIES RIGHT    (Results Pending)           Assessment/Plan:    Active Hospital Problems    Diagnosis     NSTEMI (non-ST elevated myocardial infarction) ([address] [I21.4]     SOB (shortness of breath) on exertion [R06.02]      1.   This is a 47-year-old male with a history of coronary artery disease admitted with NSTEMI, stress test on 1/13/2022 with abnormal, cardiology consulted status post left heart catheterization on 1/14/2022 PATRICIA to RCA cardiology consulted and following. 2.  Cardiomyopathy 2D echo on 1/2022 EF of 40 to 45% ,continue with the current medication. Cardiology consulted and following  3. History of TAVR  4. Hypertension continue with the current medication. 5.  Diabetes mellitus type 2 on basal bolus and sliding scale hemoglobin A1c= 9.4 on 1/12/2022.  6.  End-stage renal disease nephrology consulted and following continue with the hemodialysis Monday Wednesday and Friday. 7.  Anemia of chronic kidney disease continue with the DAVON during hemodialysis. 8.  Obesity BMI 39.13  9. Obstructive sleep apnea on his BiPAP at night. 10.  Acute respiratory failure with hypoxia secondary to CHF/valvular heart disease continue with the diuresis per nephrology. Wean oxygen as tolerated continue with inhalers. 11.  COPD continue with inhalers. 12.  Right lower extremity wound continue with wound care. 13.  History of depression continue with Seroquel. DVT Prophylaxis: Heparin drip  Diet: ADULT DIET;  Regular  Code Status: Full Code    PT/OT Eval Status:     Mitchel Bryant MD

## 2022-01-16 NOTE — PROGRESS NOTES
Department of Internal Medicine  Nephrology Progress Note        SUBJECTIVE:    We are following this patient for ESRD management. The patient was seen and examined; he was resting comfortably with no acute events noted overnight. ROS: No fever or chills. Social: No family at bedside. Physical Exam:    VITALS:  /69   Pulse 71   Temp 97.5 °F (36.4 °C) (Oral)   Resp 18   Ht 5' 9\" (1.753 m)   Wt 264 lb 15.9 oz (120.2 kg)   SpO2 99%   BMI 39.13 kg/m²     General appearance: Seems comfortable, no acute distress. Neck: Trachea midline, thyroid normal.   Lungs:  Non labored breathing, CTA to anterior auscultation. Heart:  S1S2 normal, rub or gallop. No peripheral edema. Abdomen: Soft, non-tender, no organomegaly. Skin: No lesions or rashes, warm to touch. DATA:    CBC:   Lab Results   Component Value Date    WBC 8.3 01/15/2022    RBC 2.95 01/15/2022    HGB 8.6 01/15/2022    HCT 26.2 01/15/2022    MCV 89.1 01/15/2022    MCH 29.4 01/15/2022    MCHC 33.0 01/15/2022    RDW 16.1 01/15/2022     01/15/2022    MPV 7.9 01/15/2022     BMP:    Lab Results   Component Value Date     01/16/2022    K 4.4 01/16/2022    K 5.2 11/29/2021    CL 93 01/16/2022    CO2 23 01/16/2022    BUN 52 01/16/2022    LABALBU 3.6 01/12/2022    CREATININE 6.9 01/16/2022    CALCIUM 8.6 01/16/2022    GFRAA 10 01/16/2022    GFRAA >60 05/17/2013    LABGLOM 8 01/16/2022    GLUCOSE 78 01/16/2022       IMPRESSION/RECOMMENDATIONS:      1- ESRD: We will continue hemodialysis per [facility] schedule. 2- No significant electrolytes disorders noted. 3- HTN: Blood pressure within acceptable range. 4- Anemia: Stable hemoglobin, continue DAVON during dialysis. 5- CAD: Management per Cardiology.

## 2022-01-16 NOTE — PROGRESS NOTES
[address]  Cardiology  Progress Note    Admission date:  2022    Reason for follow up visit: NSTEMI, CAD    HPI/CC: Irene Francisco is a 46 y.o. male who was admitted 2022 for shortness of breath. Troponin elevated. Echo showed EF 40-45%. Stress test abnormal. LHC showed significant RCA treated with PATRICIA. Rhythm overnight has been sinus. Subjective: Shortness of breath has improved. Chest discomfort has not changed. Right wrist extremely painful. Vitals:  Blood pressure 105/60, pulse 74, temperature 98.3 °F (36.8 °C), temperature source Oral, resp. rate 14, height 5' 9\" (1.753 m), weight 264 lb 15.9 oz (120.2 kg), SpO2 99 %.   Temp  Av.5 °F (36.9 °C)  Min: 98.3 °F (36.8 °C)  Max: 98.7 °F (37.1 °C)  Pulse  Av.3  Min: 74  Max: 83  BP  Min: 105/60  Max: 121/70  SpO2  Av %  Min: 97 %  Max: 99 %  FiO2   Av %  Min: 30 %  Max: 30 %    24 hour I/O    Intake/Output Summary (Last 24 hours) at 2022 0700  Last data filed at 1/15/2022 1523  Gross per 24 hour   Intake 120 ml   Output --   Net 120 ml     Current Facility-Administered Medications   Medication Dose Route Frequency Provider Last Rate Last Admin    aspirin chewable tablet 81 mg  81 mg Oral Daily JAY Chopra CNP   81 mg at 01/15/22 0910    oxyCODONE-acetaminophen (PERCOCET) 7.5-325 MG per tablet 1 tablet  1 tablet Oral Q8H PRN JAY Vieyra CNP   1 tablet at 01/15/22 2030    furosemide (LASIX) injection 40 mg  40 mg IntraVENous BID Yamile Yung MD   40 mg at 01/15/22 183    prochlorperazine (COMPAZINE) injection 5 mg  5 mg IntraVENous Q6H PRN Stormy Varner MD        insulin lispro (HUMALOG) injection vial 0-6 Units  0-6 Units SubCUTAneous 4x Daily AC & HS Yamile Yung MD   1 Units at 01/15/22 2025    iopamidol (ISOVUE-370) 76 % injection 75 mL  75 mL IntraVENous ONCE PRN Yamile Yung MD        aspirin EC tablet 162 mg  162 mg Oral Once MD Alhaji Gomez IntraVENous Continuous Parisa Dowd MD   Stopped at 01/14/22 1107    heparin (porcine) injection 4,700 Units  4,700 Units IntraCATHeter PRN Parisa Dowd MD   4,700 Units at 01/12/22 1251    insulin glargine (LANTUS) injection vial 25 Units  25 Units SubCUTAneous Nightly Parisa Dowd MD   25 Units at 01/15/22 2025    metoprolol succinate (TOPROL XL) extended release tablet 50 mg  50 mg Oral Daily Parisa Dowd MD   50 mg at 01/15/22 0910    lisinopril (PRINIVIL;ZESTRIL) tablet 5 mg  5 mg Oral Daily Parisa Dowd MD   5 mg at 01/15/22 1728     Review of Systems   Respiratory: Positive for shortness of breath. Cardiovascular: Positive for chest pain. Gastrointestinal: Negative. Neurological: Negative. Objective:     Telemetry monitor: SR    Physical Exam:  Constitutional:  Comfortable and alert, NAD, appears older than stated age, obese  Eyes: PERRL, sclera nonicteric  Neck:  Supple, no masses, no thyroidmegaly, JVD difficult to assess  Skin:  Warm and dry; no rash or lesions  Heart: Regular, normal apex, S1 and S2 normal, no M/G/R  Lungs:  Normal respiratory effort; diminished  Abdomen: soft, non tender, + bowel sounds  Extremities:  +BLE edema  Neuro: alert and oriented, moves legs and arms equally, normal mood and affect  Right radial site soft, no hematoma, 2+ pulse, very tender    Data Reviewed:    Coronary angiogram 1/14/2022:  1. Left heart catheterization  2. Selective left and right coronary angiogram  3. PCI of the RCA with PATRICIA  Procedure Findings:  1. 99% mid RCA  2. 60-70% CIRC and DIAG  3. Elevated left heart hemodynamics              ~LVEDP 30       Details:              Norvel Cockayne was brought to the cardiac catheterization lab in a fasting state after informed consent was obtained. If the patient was able to provide written consent, it was obtained. The patient's vitals were monitored through out the procedure.  The patient was sedated using the appropriate levels of sedation and ASA guidelines. The appropriate access site area was prepped and drapped in a sterile fashion. The area was anesthetized with 2% lidocaine. Using the modified Seldinger technique, an arterial sheath was introduced into the arterial access site using a single anterior wall puncture. The sheath was flushed and prepped in usual fashion. Catheters used during the procedure included 5 Portuguese TIG 4.0 catheter. The catheters were advanced and removed over a .035\" wire, into the appropriate positions. Multiple angiographic views were obtained. An LV gram was not obtained. ~The interventional portion of the procedure was completed utilizing a multipurpose 6 [place] guide. Multipurpose guide was advanced into the right coronary ostium. A gentleman therapy aspirin, Plavix, Angiomax was initiated. A BMW wire was advanced into the distal right coronary artery. The lesion was initially predilated with a 2.75 x 15 mm Euphora balloon followed by a 3.5 mm x 20 mm Euphora balloon and subsequently stented with a resolute [place] 4.0 mm x 38 mm balloon. We did experience a no reflow phenomenon. We separately done. A twin pass catheter and then did local drug delivery with 200 mcg of nitroprusside and 200 mcgnitroglycerin. Provided restoration of SCARLETT-3 flow. Findings:  1. Left main coronary artery was normal. It gave off the left anterior descending artery and left circumflex. 2. Left anterior descending artery has 60% severe atherosclerotic disease. It was moderate in size. It gave off septal perforators and a moderate sized diagonal branch. The LAD covered the entire apex of the left ventricle. 3. Left circumflex has 60% severe atherosclerotic disease. It was moderate in size. There was a moderate sized obtuse marginal branch. 4. Right coronary artery has 99% severe atherosclerotic disease.  It was moderate in size and was the dominant artery. CONCLUSIONS:  1. Apercutaneous coronary intervention of the right coronary artery utilizing a single resolute Welch drug-eluting stent  ASSESSMENT/RECOMMENDATIONS:  1. Dual antiplatelet therapy  2. Medical management of the remaining coronary disease    Echo 1/2022:   Limited only f/u for LVEF and RVF. The left ventricular systolic function is mildly reduced with an ejection   fraction of 40-45 %. There is hypokinesis of the apex and inferior walls. There is a very small circumferential pericardial effusion noted. No tamponade physiology. The right ventricle is normal in size and function. ABIs 6/8/2020:  DESTINEY's indicate mild bilateral lower extremity arterial disease.     <50% stenoses in the bilateral common, superficial femoral and popliteal     arteries.  Tibial vessels not well visualized.       Echo 7/9/2020:  Low Normal systolic function with an estimated ejection fraction of 50%.   Left ventricular function and wall motion is difficult to estimate due to   poor endocardial visualization.   Grade I diastolic dysfunction with normal filing pressure.   Normally functioning TAVR 29 mm Langford Leyda S3 bioprosthetic valve in   aortic position appears well seated with a max velocity of 2.11 m/sec,   maximum gradient of 18 mmHg and a mean gradient of 8 mmHg.   There is no evidence of aortic regurgitation.     Coronary angiogram 8/22/2019:  LM <20%  LAD patent stent  Cx <20%  RCA 30%  Severe AS by echo  Proceed w/ TAVR    Lab Reviewed:     Renal Profile:  Lab Results   Component Value Date    CREATININE 6.9 01/16/2022    BUN 52 01/16/2022     01/16/2022    K 4.4 01/16/2022    K 5.2 11/29/2021    CL 93 01/16/2022    CO2 23 01/16/2022     CBC:    Lab Results   Component Value Date    WBC 8.3 01/15/2022    RBC 2.95 01/15/2022    HGB 8.6 01/15/2022    HCT 26.2 01/15/2022    MCV 89.1 01/15/2022    RDW 16.1 01/15/2022     01/15/2022     BNP:    Lab Results   Component Value Date PROBNP 33,575 01/16/2022    PROBNP 57,050 01/12/2022    PROBNP 34,022 12/02/2021    PROBNP 39,503 11/29/2021    PROBNP 17,779 10/15/2021     Fasting Lipid Panel:    Lab Results   Component Value Date    CHOL 251 11/30/2021    HDL 53 11/30/2021    TRIG 214 11/30/2021     Cardiac Enzymes:  CK/MbTroponin  Lab Results   Component Value Date    CKTOTAL 167 06/05/2020    TROPONINI 0.39 01/12/2022     PT/ INR   Lab Results   Component Value Date    INR 0.98 10/11/2021    INR 0.95 07/27/2021    INR 1.00 12/07/2020    PROTIME 11.1 10/11/2021    PROTIME 10.7 07/27/2021    PROTIME 11.6 12/07/2020     PTT No results found for: PTT   Lab Results   Component Value Date    MG 2.00 01/16/2022      Lab Results   Component Value Date    TSH 1.17 11/30/2021     All labs and imaging reviewed today    Assessment:  Shortness of breath: improved  NSTEMI  CAD: s/p PATRICIA RCA 1/14/2022; s/p PATRICIA LAD 2015  Acute on chronic systolic CHF: ongoing  Bicuspid AV/severe AS: s/p TAVR 10/2019  Ischemic cardiomyopathy: EF 40-45% on echo 1/12/2022  PVD: s/p PTA/stent R external iliac 5/2019  Bradycardia/transient sinus pauses: noted 12/12/2020, no recurrence  HTN: stable  HLD: uncontrolled, , statin and recheck  ESRD: on HD MWF; follows with Dr. Sara Ramirez  DM: uncontrolled, hgb a1c 9.4  Anemia  ZAINAB  History of CVA    Plan:   1. Activity restrictions reviewed  2. Continue aspirin, plavix, toprol, lisinopril, statin  3. Needs more fluid removal with HD (LVEDP 30 on 1/14/2022)  4. Right radial ultrasound due to pain post cath  5. If ultrasound negative (this will likely be performed 1/17/2022), cardiology will sign off. Will arrange follow up.      Roma Longoria, APRN-CNP  [address]  [phone] [place]

## 2022-01-16 NOTE — PROGRESS NOTES
01/16/22 0120   NIV Type   Skin Assessment Clean, dry, & intact   Skin Protection for O2 Device Yes   Location Nose   Equipment Type v60   Mode Bilevel   Mask Type Full face mask   Mask Size Medium   Settings/Measurements   IPAP 16 cmH20   CPAP/EPAP 6 cmH2O   Resp 15   FiO2  30 %   Vt Exhaled 714 mL   Minute Volume 10.5 Liters   Mask Leak (lpm) 9 lpm   Comfort Level Good   Using Accessory Muscles No

## 2022-01-16 NOTE — PROGRESS NOTES
01/15/22 2135   NIV Type   Skin Assessment Clean, dry, & intact   Skin Protection for O2 Device Yes   Location Nose   Equipment Type V60   Mode Bilevel   Mask Type Full face mask   Mask Size Medium   Settings/Measurements   IPAP 16 cmH20   CPAP/EPAP 6 cmH2O   Resp 13   FiO2  30 %   Vt Exhaled 911 mL   Minute Volume 13 Liters   Mask Leak (lpm) 11 lpm   Comfort Level Good   Using Accessory Muscles No   SpO2 99

## 2022-01-16 NOTE — PROGRESS NOTES
01/16/22 0403   NIV Type   $NIV $Daily Charge   Skin Protection for O2 Device Yes   Location Nose   Equipment Type V60   Mode Bilevel   Mask Type Full face mask   Mask Size Medium   Settings/Measurements   IPAP 16 cmH20   CPAP/EPAP 6 cmH2O   Resp 14   FiO2  30 %   Vt Exhaled 780 mL   Minute Volume 12 Liters   Mask Leak (lpm) 0 lpm   Comfort Level Good   Using Accessory Muscles No

## 2022-01-17 ENCOUNTER — APPOINTMENT (OUTPATIENT)
Dept: VASCULAR LAB | Age: 52
DRG: 246 | End: 2022-01-17
Payer: COMMERCIAL

## 2022-01-17 VITALS
SYSTOLIC BLOOD PRESSURE: 133 MMHG | DIASTOLIC BLOOD PRESSURE: 80 MMHG | HEIGHT: 69 IN | WEIGHT: 259.26 LBS | RESPIRATION RATE: 18 BRPM | OXYGEN SATURATION: 97 % | TEMPERATURE: 98 F | HEART RATE: 84 BPM | BODY MASS INDEX: 38.4 KG/M2

## 2022-01-17 LAB
ANION GAP SERPL CALCULATED.3IONS-SCNC: 18 MMOL/L (ref 3–16)
BUN BLDV-MCNC: 61 MG/DL (ref 7–20)
CALCIUM SERPL-MCNC: 8.3 MG/DL (ref 8.3–10.6)
CHLORIDE BLD-SCNC: 95 MMOL/L (ref 99–110)
CO2: 20 MMOL/L (ref 21–32)
CREAT SERPL-MCNC: 8 MG/DL (ref 0.9–1.3)
GFR AFRICAN AMERICAN: 9
GFR NON-AFRICAN AMERICAN: 7
GLUCOSE BLD-MCNC: 120 MG/DL (ref 70–99)
GLUCOSE BLD-MCNC: 145 MG/DL (ref 70–99)
GLUCOSE BLD-MCNC: 162 MG/DL (ref 70–99)
MAGNESIUM: 2 MG/DL (ref 1.8–2.4)
PERFORMED ON: ABNORMAL
PERFORMED ON: ABNORMAL
POTASSIUM SERPL-SCNC: 5.1 MMOL/L (ref 3.5–5.1)
SODIUM BLD-SCNC: 133 MMOL/L (ref 136–145)

## 2022-01-17 PROCEDURE — 94761 N-INVAS EAR/PLS OXIMETRY MLT: CPT

## 2022-01-17 PROCEDURE — 94660 CPAP INITIATION&MGMT: CPT

## 2022-01-17 PROCEDURE — 2700000000 HC OXYGEN THERAPY PER DAY

## 2022-01-17 PROCEDURE — 93931 UPPER EXTREMITY STUDY: CPT

## 2022-01-17 PROCEDURE — 6370000000 HC RX 637 (ALT 250 FOR IP): Performed by: NURSE PRACTITIONER

## 2022-01-17 PROCEDURE — 6370000000 HC RX 637 (ALT 250 FOR IP): Performed by: INTERNAL MEDICINE

## 2022-01-17 PROCEDURE — 6360000002 HC RX W HCPCS: Performed by: INTERNAL MEDICINE

## 2022-01-17 PROCEDURE — 80048 BASIC METABOLIC PNL TOTAL CA: CPT

## 2022-01-17 PROCEDURE — 83735 ASSAY OF MAGNESIUM: CPT

## 2022-01-17 PROCEDURE — 36415 COLL VENOUS BLD VENIPUNCTURE: CPT

## 2022-01-17 PROCEDURE — 94640 AIRWAY INHALATION TREATMENT: CPT

## 2022-01-17 PROCEDURE — 90935 HEMODIALYSIS ONE EVALUATION: CPT

## 2022-01-17 RX ORDER — ASPIRIN 81 MG/1
81 TABLET, CHEWABLE ORAL DAILY
Qty: 30 TABLET | Refills: 3 | Status: ON HOLD | OUTPATIENT
Start: 2022-01-18 | End: 2022-03-03 | Stop reason: HOSPADM

## 2022-01-17 RX ORDER — LISINOPRIL 5 MG/1
5 TABLET ORAL DAILY
Qty: 30 TABLET | Refills: 3 | Status: SHIPPED | OUTPATIENT
Start: 2022-01-18 | End: 2022-02-10

## 2022-01-17 RX ORDER — INSULIN GLARGINE 100 [IU]/ML
30 INJECTION, SOLUTION SUBCUTANEOUS NIGHTLY
Qty: 15 ML | Refills: 1 | Status: ON HOLD | OUTPATIENT
Start: 2022-01-17 | End: 2022-06-01 | Stop reason: HOSPADM

## 2022-01-17 RX ORDER — METOPROLOL SUCCINATE 50 MG/1
50 TABLET, EXTENDED RELEASE ORAL DAILY
Qty: 30 TABLET | Refills: 3 | Status: SHIPPED | OUTPATIENT
Start: 2022-01-18 | End: 2022-02-10 | Stop reason: SDUPTHER

## 2022-01-17 RX ADMIN — FAMOTIDINE 20 MG: 20 TABLET, FILM COATED ORAL at 12:22

## 2022-01-17 RX ADMIN — DULOXETINE HYDROCHLORIDE 60 MG: 60 CAPSULE, DELAYED RELEASE ORAL at 12:22

## 2022-01-17 RX ADMIN — TIOTROPIUM BROMIDE AND OLODATEROL 2 PUFF: 3.124; 2.736 SPRAY, METERED RESPIRATORY (INHALATION) at 12:15

## 2022-01-17 RX ADMIN — ASPIRIN 81 MG 81 MG: 81 TABLET ORAL at 12:22

## 2022-01-17 RX ADMIN — PROCHLORPERAZINE EDISYLATE 5 MG: 5 INJECTION INTRAMUSCULAR; INTRAVENOUS at 11:59

## 2022-01-17 RX ADMIN — PANTOPRAZOLE SODIUM 40 MG: 40 TABLET, DELAYED RELEASE ORAL at 12:22

## 2022-01-17 RX ADMIN — METOPROLOL SUCCINATE 50 MG: 50 TABLET, EXTENDED RELEASE ORAL at 12:22

## 2022-01-17 RX ADMIN — CLOPIDOGREL 75 MG: 75 TABLET, FILM COATED ORAL at 12:23

## 2022-01-17 RX ADMIN — LISINOPRIL 5 MG: 5 TABLET ORAL at 12:23

## 2022-01-17 RX ADMIN — OXYCODONE AND ACETAMINOPHEN 1 TABLET: 7.5; 325 TABLET ORAL at 12:23

## 2022-01-17 ASSESSMENT — PAIN SCALES - GENERAL
PAINLEVEL_OUTOF10: 0
PAINLEVEL_OUTOF10: 10
PAINLEVEL_OUTOF10: 0
PAINLEVEL_OUTOF10: 10
PAINLEVEL_OUTOF10: 0
PAINLEVEL_OUTOF10: 0

## 2022-01-17 ASSESSMENT — PAIN DESCRIPTION - PAIN TYPE: TYPE: ACUTE PAIN;CHRONIC PAIN

## 2022-01-17 ASSESSMENT — PAIN DESCRIPTION - LOCATION: LOCATION: HEAD;BACK

## 2022-01-17 NOTE — CARE COORDINATION
CASE MANAGEMENT DISCHARGE SUMMARY      Discharge to: home;  Outpt HD at [facility] MWF as prior to admission         IMM given: (date) 1/17/22         Transportation:    Family/car: wife        Confirmed discharge plan with:     Patient: yes; wife coming to pick pt up         RN, name: Pily Haas

## 2022-01-17 NOTE — PROGRESS NOTES
Pt is back to floor from dialysis. /80   Pulse 84   Temp 98 °F (36.7 °C) (Oral)   Resp 18   Ht 5' 9\" (1.753 m)   Wt 265 lb 6.9 oz (120.4 kg)   SpO2 97%   BMI 39.20 kg/m²  on room air. Pt with some nausea and complaints of headache and chronic back pain, prn compazine and prn percocet given. Lungs diminished. NSR on tele. Lunch at bedside at this time. Pt anticipating discharge today. Pt denies any other needs at this time. Bedside table and call light within reach. Pt instructed to call out for any needs and assistance. Will continue to monitor.   Aiden Edward RN  1/17/2022

## 2022-01-17 NOTE — DISCHARGE SUMMARY
Hospital Medicine Discharge Summary    Patient ID: Glendy Dugan      Patient's PCP: Mckay Melton MD    Admit Date: 1/12/2022     Discharge Date:   1/17/2022    Admitting Provider: Maria R Vega DO     Discharge Provider: Sammi Mann MD     Discharge Diagnoses: Active Hospital Problems    Diagnosis     NSTEMI (non-ST elevated myocardial infarction) ([facility] [address] [I21.4]     SOB (shortness of breath) on exertion [R06.02]        The patient was seen and examined on day of discharge and this discharge summary is in conjunction with any daily progress note from day of discharge. Hospital Course:   46 y.o. 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     Patient reported that he has chronic shortness of breath however has gotten significantly worse the past few days especially today around 6 PM.  Patient reportedly tried inhalers and nebs at home without any relief, worsened on laying flat, no alleviating factor. Patient reported that he continues to not eat salt, no increase fluid intake, no worsening lower extremity edema. Patient also reports that he has not missed any dialysis. Patient reports he does have been having chest pressure that is much worse when he lays flat. He reported does not change with exertion although the patient is limited and uses a Rollator to ambulate. Otherwise able to dress herself and it himself. Patient also reports he has been compliant with all his medication. Patient also reports that he does have sleep apnea and uses CPAP at night. Patient otherwise reports that he has been avoiding any contacts and has been coughing green material otherwise no COVID exposures.  Discussed with patient CVC risks, complications and patient was agreeable 11/29/2021    CL 95 01/17/2022    CO2 20 01/17/2022    BUN 61 01/17/2022    CREATININE 8.0 01/17/2022    CALCIUM 8.3 01/17/2022    PHOS 5.5 12/03/2021         Significant Diagnostic Studies    Radiology:   VL DUP UPPER EXTREMITY ARTERIES RIGHT   Final Result      NM Cardiac Stress Test Nuclear Imaging   Final Result      XR CHEST PORTABLE   Final Result   Portable study is limited by body habitus. Lungs are grossly clear. Status post TAVR. Left IJ tunneled dialysis catheter is in good position. Consults:     IP CONSULT TO HOSPITALIST  IP CONSULT TO NEPHROLOGY  IP CONSULT TO CARDIOLOGY  IP CONSULT TO HEART FAILURE NURSE/COORDINATOR  IP CONSULT TO DIETITIAN  IP CONSULT TO CARDIAC REHAB    Disposition:  Home     Condition at Discharge: Stable    Discharge Instructions/Follow-up:    Follow up with cardiology as scheduled  Follow up with Noelle Christy MD 1-2 weeks. Return to hospital for recurrent chest pain or worsening shortness of breath. Code Status:  Full Code     Activity: activity as tolerated    Diet: cardiac diet, diabetic diet and renal diet      Discharge Medications:     Current Discharge Medication List           Details   aspirin 81 MG chewable tablet Take 1 tablet by mouth daily  Qty: 30 tablet, Refills: 3      lisinopril (PRINIVIL;ZESTRIL) 5 MG tablet Take 1 tablet by mouth daily  Qty: 30 tablet, Refills: 3              Details   insulin glargine (BASAGLAR KWIKPEN) 100 UNIT/ML injection pen Inject 30 Units into the skin nightly  Qty: 15 mL, Refills: 1    Associated Diagnoses: Type 2 diabetes mellitus with diabetic nephropathy, with long-term current use of insulin ([facility])      metoprolol succinate (TOPROL XL) 50 MG extended release tablet Take 1 tablet by mouth daily  Qty: 30 tablet, Refills: 3              Details   oxyCODONE-acetaminophen (PERCOCET) 7.5-325 MG per tablet Take 1 tablet by mouth every 6 hours as needed (pain) for up to 30 days.   Qty: 120 tablet, Refills: 0 Comments: Reduce doses taken as pain becomes manageable  Associated Diagnoses: Chronic pain syndrome      pantoprazole (PROTONIX) 40 MG tablet TAKE 1 TABLET BY MOUTH EVERY MORNING BEFORE BREAKFAST  Qty: 30 tablet, Refills: 1    Associated Diagnoses: Gastroesophageal reflux disease without esophagitis      gabapentin (NEURONTIN) 100 MG capsule TAKE 1-2 CAPSULES BY MOUTH THREE TIMES A DAY  Qty: 180 capsule, Refills: 5    Associated Diagnoses: Chronic pain syndrome      clopidogrel (PLAVIX) 75 MG tablet TAKE 1 TABLET BY MOUTH ONCE DAILY  Qty: 90 tablet, Refills: 1       MG capsule TAKE 1 CAPSULE BY MOUTH TWICE DAILY  Qty: 60 capsule, Refills: 5    Associated Diagnoses: Constipation, unspecified constipation type      cyclobenzaprine (FLEXERIL) 10 MG tablet TAKE (1) TABLET BY MOUTH EVERY 8 HOURS AS NEEDED  Qty: 90 tablet, Refills: 2    Associated Diagnoses: Chronic pain syndrome      QUEtiapine (SEROQUEL) 50 MG tablet TAKE 1 TABLET BY MOUTH IN THE EVENING  Qty: 30 tablet, Refills: 2    Associated Diagnoses: Insomnia, unspecified type; Mood disorder (HCC)      thiamine (B-1) 100 MG/ML injection Infuse 1 mL intravenously daily  Qty: 1 each, Refills: 0      Multiple Vitamins-Minerals (THERAPEUTIC MULTIVITAMIN-MINERALS) tablet Take 1 tablet by mouth daily  Refills: 0      Continuous Blood Gluc Sensor (DEXCOM G6 SENSOR) MISC Every 10 days  Qty: 9 each, Refills: 3    Associated Diagnoses: DM (diabetes mellitus), secondary, uncontrolled, w/neurologic complic (HCC)      Continuous Blood Gluc Transmit (DEXCOM G6 TRANSMITTER) MISC 1 each by Does not apply route every 3 months  Qty: 1 each, Refills: 3    Associated Diagnoses: DM (diabetes mellitus), secondary, uncontrolled, w/neurologic complic (HCC)      Continuous Blood Gluc  (DEXCOM G6 ) ADAM 1 each by Does not apply route Daily with lunch  Qty: 1 each, Refills: 0    Associated Diagnoses: DM (diabetes mellitus), secondary, uncontrolled, [facility]      DULoxetine (CYMBALTA) 60 MG extended release capsule TAKE 1 CAPSULE BY MOUTH EVERY DAY  Qty: 30 capsule, Refills: 5    Associated Diagnoses: Depression, unspecified depression type      traZODone (DESYREL) 150 MG tablet TAKE ONE (1) TABLET BY MOUTH NIGHTLY  Qty: 30 tablet, Refills: 10      pravastatin (PRAVACHOL) 40 MG tablet TAKE 1 TABLET BY MOUTH EACH EVENING  Qty: 30 tablet, Refills: 10      B Complex-C-Folic Acid (VIRT-CAPS) 1 MG CAPS TAKE 1 CAPSULE BY MOUTH EVERY DAY  Qty: 90 capsule, Refills: 1      Calcium Acetate, Phos Binder, 667 MG CAPS TAKE 1 CAPSULE BY MOUTH THREE TIMES DAILY WITH MEALS  Qty: 90 capsule, Refills: 3      LINZESS 145 MCG capsule TAKE 1 CAPSULE BY MOUTH EVERY MORNING BEFORE BREAKFAST  Qty: 30 capsule, Refills: 10    Associated Diagnoses: Chronic idiopathic constipation      !! blood glucose test strips (GLUCOSE METER TEST) strip 1 each by In Vitro route 5 times daily As needed. Qty: 100 each, Refills: 3      nitroGLYCERIN (NITROSTAT) 0.4 MG SL tablet DISSOLVE 1 TABLET UNDER THE TONGUE AS NEEDED FOR CHEST PAIN EVERY 5 MINUTES UP TO 3 TIMES. IF NO RELIEF CALL 911. Qty: 25 tablet, Refills: 10    Associated Diagnoses: Coronary artery disease involving native heart without angina pectoris, unspecified vessel or lesion type      insulin aspart (NOVOLOG FLEXPEN) 100 UNIT/ML injection pen Inject 20 Units into the skin 3 times daily (before meals)  Qty: 15 pen, Refills: 5    Associated Diagnoses: Type 2 diabetes mellitus with diabetic nephropathy, with long-term current use of insulin ([facility]      ! ! blood glucose test strips (FREESTYLE LITE) strip Daily As needed.   Qty: 100 strip, Refills: 3    Comments: Please dispense what is covered by insurance      vitamin D (ERGOCALCIFEROL) 12351 units CAPS capsule TK 1 C PO WEEKLY  Refills: 11      Tiotropium Bromide-Olodaterol (STIOLTO RESPIMAT) 2.5-2.5 MCG/ACT AERS Inhale 2 puffs into the lungs daily  Qty: 2 Inhaler, Refills: 0 Comments: 2 samples given:  Lot #[id number], Exp 7/21 & Lot #[id number], Exp 7/21      Polyethylene Glycol 3350 GRAN       !! Glucose Blood (BLOOD GLUCOSE TEST STRIPS) STRP TEST 3-4 TIMES DAILY, AS DIRECTED  Qty: 100 strip, Refills: 3      Blood Glucose Monitoring Suppl ADAM USE AS DIRECTED. Qty: 1 Device, Refills: 0    Comments: WITH CONTROL SOLUTION. Alcohol Swabs PADS USE AS DIRECTED  Qty: 300 each, Refills: 3      albuterol sulfate  (90 Base) MCG/ACT inhaler Inhale 2 puffs into the lungs every 6 hours as needed for Wheezing  Qty: 1 Inhaler, Refills: 3      ipratropium-albuterol (DUONEB) 0.5-2.5 (3) MG/3ML SOLN nebulizer solution Inhale 3 mLs into the lungs every 6 hours as needed for Shortness of Breath  Qty: 360 mL, Refills: 1      calcium carbonate (TUMS) 500 MG chewable tablet Take 1 tablet by mouth 3 times daily as needed for Heartburn. !! - Potential duplicate medications found. Please discuss with provider. Time Spent on discharge is more than 45 minutes in the examination, evaluation, counseling and review of medications and discharge plan. Signed:    Fransisco Chowdary MD   1/17/2022      Thank you Smitha Simpson MD for the opportunity to be involved in this patient's care. If you have any questions or concerns please feel free to contact me at [phone].

## 2022-01-17 NOTE — PROGRESS NOTES
Pt taken off bipap, on room air. Pt going off floor to dialysis. CMU made aware.   Naomi Quesada RN  1/17/2022

## 2022-01-17 NOTE — PROGRESS NOTES
Order for pt to be discharged. Peripheral IV and tele monitor removed. Tele monitor returned to nurses station. Prescriptions were sent to pt's [address] by MD.  Information provided along with side effects for pt's new medications lisinopril and aspirin. Information provided and reviewed on chest pain, discharge instructions for radial cath and PCI post op along with site care, activity, diet, signs/symptoms to call MD/Madeleine with positive teach back. Pt has a scheduled follow up with  PCP Dr. Aj Najera on January 21st at 10:30 for virtual appointment. Writer encouraged pt to monitor blood sugar at home, per pt does not have blood glucose monitor kit, and has not monitored blood sugar in years. Writer discussed with pt that writer could check with outpatient pharmacy, pt declined writer to at this time. Writer encouraged pt to discuss with PCP during virtual appointment on January 21st regarding the need for blood glucose monitor kit, with pt stating that he would. Pt has a scheduled follow up appointment with Kaye Alcocer on February 10th at 2:30. Pt's stent card provided to pt. Pt verbalizes understanding of all discharge instructions having no further questions. Pt transported to exit via wheel chair, wife at main entrance.   Bertha Obrien RN  1/17/2022

## 2022-01-17 NOTE — PROGRESS NOTES
Nephrology Progress Note   [facility]. [facility]       SUBJECTIVE:    We are following this patient for ESRD management. The patient was seen and examined  Tolerated HD well today    ROS: No fever or chills. Social: No family at bedside. Physical Exam:    VITALS:  /75   Pulse 74   Temp 98.2 °F (36.8 °C) (Oral)   Resp 18   Ht 5' 9\" (1.753 m)   Wt 265 lb 6.9 oz (120.4 kg)   SpO2 94%   BMI 39.20 kg/m²     General appearance: Seems comfortable, no acute distress. Neck: Trachea midline, thyroid normal.   Lungs:  Non labored breathing, CTA to anterior auscultation. Heart:  S1S2 normal, rub or gallop. No peripheral edema. Abdomen: Soft, non-tender, no organomegaly. Skin: No lesions or rashes, warm to touch. DATA:    CBC:   Lab Results   Component Value Date    WBC 8.3 01/15/2022    RBC 2.95 01/15/2022    HGB 8.6 01/15/2022    HCT 26.2 01/15/2022    MCV 89.1 01/15/2022    MCH 29.4 01/15/2022    MCHC 33.0 01/15/2022    RDW 16.1 01/15/2022     01/15/2022    MPV 7.9 01/15/2022     BMP:    Lab Results   Component Value Date     01/17/2022    K 5.1 01/17/2022    K 5.2 11/29/2021    CL 95 01/17/2022    CO2 20 01/17/2022    BUN 61 01/17/2022    LABALBU 3.6 01/12/2022    CREATININE 8.0 01/17/2022    CALCIUM 8.3 01/17/2022    GFRAA 9 01/17/2022    GFRAA >60 05/17/2013    LABGLOM 7 01/17/2022    GLUCOSE 145 01/17/2022       IMPRESSION/RECOMMENDATIONS:      1- ESRD: We will continue hemodialysis per [facility] schedule. 2- No significant electrolytes disorders noted. 3- HTN: Blood pressure within acceptable range. 4- Anemia: Stable hemoglobin, continue DAVON during dialysis. 5- CAD: Management per Cardiology.

## 2022-01-17 NOTE — PLAN OF CARE
Problem: Falls - Risk of:  Goal: Will remain free from falls  Description: Will remain free from falls  Outcome: Ongoing  Goal: Absence of physical injury  Description: Absence of physical injury  Outcome: Ongoing     Problem: Pain:  Goal: Pain level will decrease  Description: Pain level will decrease  Outcome: Ongoing  Goal: Control of acute pain  Description: Control of acute pain  Outcome: Ongoing  Goal: Control of chronic pain  Description: Control of chronic pain  Outcome: Ongoing     Problem: Coping:  Goal: Ability to cope will improve  Description: Ability to cope will improve  Outcome: Ongoing

## 2022-01-17 NOTE — DISCHARGE INSTR - COC
Continuity of Care Form    Patient Name: Rebecca Flores   :  1970  MRN:  [id number]    Admit date:  2022  Discharge date:  2022    Code Status Order: Full Code   Advance Directives:      Admitting Physician:  Radha Daly DO  PCP: Froy Izaguirre MD    Discharging Nurse: [facility] Unit/Room#: 1379/9113-06  Discharging Unit Phone Number:     Emergency Contact:   Extended Emergency Contact Information  Primary Emergency Contact: Crystal Ann  Address: [address] [facility], [address] Mili Blow of [address] Phone: [phone]  Mobile Phone: [phone]  Relation: Spouse    Past Surgical History:  Past Surgical History:   Procedure Laterality Date    AORTIC VALVE REPLACEMENT N/A 10/15/2019    TRANSCATHETER AORTIC VALVE REPLACEMENT FEMORAL APPROACH performed by Eladio Merlin, MD at [facility] Right 2019    PERITONEAL DIALYSIS CATHETER REMOVAL performed by Charles Cardoso MD at [address] [place] [address]      WN    CORONARY ANGIOPLASTY WITH STENT PLACEMENT  05/26/15    CYST REMOVAL  2013    EXCISION CYSTS, NECK X2 AND ABDOMINAL benign    DIAGNOSTIC CARDIAC CATH LAB PROCEDURE      DIALYSIS FISTULA CREATION Left 10/30/2017    LEFT BRACHIAL CEPHALIC FISTULA    DIALYSIS FISTULA CREATION Left 3/27/2019    LIGATION  AV FISTULA performed by Dario Verduzco MD at [address] [place] [address], [address] [place] [address], DIAGNOSTIC      OTHER SURGICAL HISTORY  2017    laparoscopic cholecystectomy with intraoperative cholangiogram    OTHER SURGICAL HISTORY  2018    PORT PLACEMENT  - vas cath    OTHER SURGICAL HISTORY Bilateral 2018    laprascopic peritoneal dialysis catheter placement    OTHER SURGICAL HISTORY Right 2018    peritoneal dialysis port placed on right side of abdomen    OTHER SURGICAL HISTORY  2019    PTA/Stenting R External Iliac artery    CO LAP INSERTION TUNNELED INTRAPERITONEAL CATHETER N/A 9/21/2018    LAPAROSCOPIC PERITONEAL DIALYSIS CATHETER REPLACEMENT performed by Katie Mae MD at [facility].  01/06/2016    UPPER GASTROINTESTINAL ENDOSCOPY  01/29/2017    possible candida, otherwise normal appearing    VASCULAR SURGERY  aprx 2 years ago    2 stents placed, each side of groin       Immunization History:   Immunization History   Administered Date(s) Administered    Hepatitis B Adult (Engerix-B) 11/18/2017, 06/13/2018, 07/13/2018    Influenza Virus Vaccine 12/27/2012, 10/07/2014, 11/20/2015, 10/16/2019    Influenza, Intradermal, Quadrivalent, Preservative Free 11/16/2016    Influenza, MDCK Quadv, IM, PF (Flucelvax 2 yrs and older) 10/14/2017, 09/30/2020, 10/05/2021    Influenza, Yesica Leonford, 6 mo and older, IM, PF (Flulaval, Fluarix) 09/15/2018    Influenza, Quadv, IM, PF (6 mo and older Fluzone, Flulaval, Fluarix, and 3 yrs and older Afluria) 10/16/2019    PPD Test 11/09/2017, 11/16/2017, 01/03/2019, 01/06/2020, 01/15/2021    Pneumococcal Conjugate 13-valent (Whdjisl33) 10/14/2017    Pneumococcal Polysaccharide ([id number]) 10/07/2014, 11/19/2019    Tdap (Boostrix, Adacel) 02/13/2020    Zoster Recombinant (Shingrix) 02/13/2020       Active Problems:  Patient Active Problem List   Diagnosis Code    Acute respiratory failure with hypoxia and hypercapnia ([facility]) J96.01, J96.02    Chronic obstructive pulmonary disease ([facility] J44.9    CAD S/P percutaneous coronary angioplasty I25.10, Z98.61    PVD (peripheral vascular disease) ([facility]) I73.9    Nonischemic cardiomyopathy ([facility]) I42.8    Sleep apnea G47.30    Bicuspid aortic valve Q23.1    Bilateral hilar adenopathy syndrome R59.0    Claudication in peripheral vascular disease ([facility]) I73.9    Essential hypertension I10    Diabetic neuropathy ([facility] E11.40    Type 2 diabetes, uncontrolled, with neuropathy ([facility] E11.40, E11.65    Passive smoke exposure Z77.22    Depression with anxiety F41.8    Hematoma T14. 8XXA    Pneumonia of right upper lobe due to infectious organism J18.9    DM (diabetes mellitus), secondary, uncontrolled, w/neurologic complic ([facility]) W02.60, P53.35    Hypertensive heart disease with congestive heart failure with preserved left ventricular function ([facility]) I11.0, I50.30    CHF exacerbation ([facility]) I50.9    Chest pain R07.9    Coronary artery disease involving native coronary artery of native heart without angina pectoris I25.10    Obesity (BMI 30-39. 9) E66.9    ZAINAB on CPAP G47.33, Z99.89    Degeneration of lumbar or lumbosacral intervertebral disc M51.37    Lumbar radiculopathy M54.16    Lumbosacral spondylosis without myelopathy O21.425    Biliary colic O09.90    Symptomatic cholelithiasis K80.20    Gastroparesis due to DM ([facility]) E11.43, K31.84    Angina, class IV ([facility]) I20.9    Dyspnea R06.00    Elevated brain natriuretic peptide (BNP) level R79.89    Dyslipidemia E78.5    Respiratory distress R06.03    Hypoxia R09.02    Chest pressure R07.89    Hypertensive urgency I16.0    Acute on chronic combined systolic and diastolic heart failure ([facility]) I50.43    Ischemic cardiomyopathy I25.5    Chronic renal failure, stage 5 ([facility]) N18.5    Tobacco abuse Z72.0    CKD stage 4 due to type 2 diabetes mellitus ([facility]) E11.22, N18.4    CVA (cerebral vascular accident) ([facility] I63.9    Arterial ischemic stroke, ICA, right, acute ([facility]) I63.231    Type 2 diabetes mellitus without complication, without long-term current use of insulin ([facility]) E11.9    HTN (hypertension), benign I10    ZAINAB (obstructive sleep apnea) G47.33    Diarrhea R19.7    Pleural effusion J90    Chronic anemia D64.9    Nonrheumatic aortic valve stenosis I35.0    Hypervolemia E87.70    Hyperkalemia E87.5    Obesity E66.9    Mucus plugging of bronchi T17.500A    Hemodialysis-associated hypotension I95.3    Left arm pain M79.602    Dizziness R42    ESRD (end stage renal disease) on dialysis ([facility]) N18.6, Z99.2    Hypotension due to drugs P11.7    Acute diastolic CHF (congestive heart failure) ([facility]) I50.31    Neuromuscular disorder ([facility]) G70.9    Acute combined systolic and diastolic CHF, NYHA class 4 ([facility]) I50.41    Renovascular hypertension I15.0    Mixed hyperlipidemia E78.2    Cigarette nicotine dependence in remission F17.211    Acute respiratory failure ([facility]) J96.00    Pulmonary edema J81.1    Fluid overload F62.80    Diastolic dysfunction G91.26    Anemia of chronic disease D63.8    SOB (shortness of breath) on exertion R06.02    Steal syndrome of dialysis vascular access [facility]) T82.898A    Chronic, continuous use of opioids F11.90    Chronic bronchitis ([facility]) J42    Nasal congestion R09.81    Hypercholesteremia E78.00    Bradycardia R00.1    S/P TAVR (transcatheter aortic valve replacement) Z95.2    Syncope and collapse R55    Atrial fibrillation ([facility]) I48.91    Former smoker Z87.891    Generalized weakness R53.1    DKA, type 1, not at goal [facility]) E10.10    Bilateral leg weakness R29.898    GBS (Guillain[place] syndrome) ([facility]) G61.0    Sinus pause I45.5    Weakness of both lower extremities R29.898    Sinus bradycardia R00.1    Ataxia R27.0    Peripheral vascular occlusive disease ([facility]) I73.9    Cellulitis of right foot L03.115    Iliac artery occlusion, right ([facility]) I74.5    Cellulitis and abscess of hand L03.119, L02.519    Type 2 diabetes mellitus with hyperglycemia ([facility]) E11.65    Acute encephalopathy G93.40    Acute hypoxemic respiratory failure ([facility]) J96.01    CHF (congestive heart failure) ([facility]) I50.9    Acute cerebrovascular accident (CVA) ([facility] I63.9    Speech problem R47.9    Left face and left arm tingling R20.2    Urinary tract infection with hematuria N39.0, R31.9    Acute CVA (cerebrovascular accident) ([facility] I63.9    Alcohol abuse, daily use F10.10    CHF (congestive heart failure), NYHA class I, acute on chronic, combined ([facility]) I50.43    Respiratory failure with hypoxia ([facility]) J96.91    Acute respiratory failure with hypercapnia ([facility]) J96.02    Acute pulmonary edema ([facility]) J81.0    Obesity, Class II, BMI 35-39.9 E66.9    Grade II diastolic dysfunction G85.38    Shock circulatory ([facility]) R57.9    Smoker F17.200    DM (diabetes mellitus), secondary uncontrolled ([facility]) E13.65    Normocytic normochromic anemia D64.9    NSTEMI (non-ST elevated myocardial infarction) ([facility] I21.4       Isolation/Infection:   Isolation            No Isolation          Patient Infection Status       Infection Onset Added Last Indicated Last Indicated By Review Planned Expiration Resolved Resolved By    None active    Resolved    COVID-19 (Rule Out) 01/12/22 01/12/22 01/12/22 COVID-19, Rapid (Ordered)   01/12/22 Rule-Out Test Resulted    COVID-19 (Rule Out) 11/29/21 11/29/21 11/29/21 COVID-19, Rapid (Ordered)   11/30/21 Rule-Out Test Resulted    COVID-19 (Rule Out) 08/29/21 08/29/21 08/29/21 COVID-19 (Ordered)   08/29/21 Rule-Out Test Resulted    COVID-19 (Rule Out) 12/18/20 12/18/20 12/18/20 COVID-19 (Ordered)   12/18/20 Rule-Out Test Resulted    COVID-19 (Rule Out) 05/04/20 05/04/20 05/04/20 COVID-19 (Ordered)   05/04/20 Rule-Out Test Resulted            Nurse Assessment:  Last Vital Signs: /80   Pulse 84   Temp 98 °F (36.7 °C) (Oral)   Resp 18   Ht 5' 9\" (1.753 m)   Wt 265 lb 6.9 oz (120.4 kg)   SpO2 97%   BMI 39.20 kg/m²     Last documented pain score (0-10 scale): Pain Level: 10  Last Weight:   Wt Readings from Last 1 Encounters:   01/17/22 265 lb 6.9 oz (120.4 kg)     Mental Status:  oriented    IV Access:  - None    Nursing Mobility/ADLs:  Walking   Independent  Transfer  Independent  Bathing  Independent  Dressing  Independent  Toileting  Independent  Feeding  Independent  Med Admin  Independent  Med Delivery   whole    Wound Care Documentation and Therapy:  Wound 08/30/21 Toe (Comment  which one) Right Great Toe (Active)   Number of days: 140       Wound 01/12/22 Pedal Anterior;Right Healing scab from previous blister (per pt), flaky edges (Active)   Wound Cleansed Not Cleansed 01/15/22 0911   Dressing/Treatment Open to air 01/17/22 1200   Wound Length (cm) 2 cm 01/13/22 1453   Wound Width (cm) 1.6 cm 01/13/22 1453   Wound Surface Area (cm^2) 3.2 cm^2 01/13/22 1453   Wound Assessment Dry;Purple/maroon 01/15/22 0911   Drainage Amount None 01/15/22 0911   Odor None 01/15/22 0911   Liberty-wound Assessment Dry/flaky 01/15/22 0911   Margins Defined edges 01/15/22 0911   Number of days: 5       Wound 01/12/22 Toe (Comment  which one) Anterior;Right Scab from old blister (per pt) on 4th toe, flaky edges (Active)   Wound Cleansed Not Cleansed 01/15/22 0911   Dressing/Treatment Open to air 01/17/22 1200   Wound Length (cm) 2.4 cm 01/13/22 1453   Wound Width (cm) 0.6 cm 01/13/22 1453   Wound Surface Area (cm^2) 1.44 cm^2 01/13/22 1453   Wound Assessment Dry;Purple/maroon 01/15/22 0911   Drainage Amount None 01/15/22 0911   Odor None 01/15/22 0911   Liberty-wound Assessment Dry/flaky 01/15/22 0911   Margins Defined edges 01/15/22 0911   Number of days: 5        Elimination:  Continence: Bowel: Yes  Bladder: Yes  Urinary Catheter: None   Colostomy/Ileostomy/Ileal Conduit: No       Date of Last BM: 1/16/2022      Intake/Output Summary (Last 24 hours) at 1/17/2022 1429  Last data filed at 1/17/2022 1301  Gross per 24 hour   Intake 660 ml   Output 175 ml   Net 485 ml     I/O last 3 completed shifts: In: 26 [P.O.:440]  Out: 0     Safety Concerns:     None    Impairments/Disabilities:      None    Nutrition Therapy:  Current Nutrition Therapy:   - Oral Diet:  General    Routes of Feeding: Oral  Liquids:  Thin Liquids  Daily Fluid Restriction: no  Last Modified Barium Swallow with Video (Video Swallowing Test): not done    Treatments at the Time of Hospital Discharge:   Respiratory Treatments:   Oxygen Therapy:   cpap at night  Ventilator:    - cpap at night    Rehab Therapies:   Weight Bearing Status/Restrictions: No weight bearing restirctions  Other Medical Equipment (for information only, NOT a DME order):  walker  Other Treatments:     Patient's personal belongings (please select all that are sent with patient):       RN SIGNATURE:  Electronically signed by Daron Kendall RN on 1/17/22 at 2:32 PM EST    CASE MANAGEMENT/SOCIAL WORK SECTION    Inpatient Status Date: ***    Readmission Risk Assessment Score:  Readmission Risk              Risk of Unplanned Readmission:  75           Discharging to Facility/ Agency   Name:   Address:  Phone:  Fax:    Dialysis Facility (if applicable)   Name:  Address:  Dialysis Schedule:  Phone:  Fax:    / signature: {Esignature:[id number]}    PHYSICIAN SECTION    Prognosis: {Prognosis:6758932799}    Condition at Discharge: [address] [place] [address] Patient Condition:[id number]}    Rehab Potential (if transferring to Rehab): {Prognosis:7206763824}    Recommended Labs or Other Treatments After Discharge: ***    Physician Certification: I certify the above information and transfer of Trevor Freeman  is necessary for the continuing treatment of the diagnosis listed and that he requires {Admit to Appropriate Level of Care:93345} for {GREATER/LESS:448505417} 30 days.      Update Admission H&P: {CHP DME Changes in [facility]:909115992}    PHYSICIAN SIGNATURE:  {Esignature:929222463}

## 2022-01-17 NOTE — PROGRESS NOTES
01/17/22 0007   NIV Type   Equipment Type v60   Mode Bilevel   Mask Type Full face mask   Mask Size Medium   Settings/Measurements   IPAP 16 cmH20   CPAP/EPAP 6 cmH2O   Rate Ordered 12   Resp 16   FiO2  30 %   Vt Exhaled 988 mL   Minute Volume 16.1 Liters   Mask Leak (lpm) 5 lpm   Comfort Level Good   Using Accessory Muscles No   SpO2 98

## 2022-01-17 NOTE — PROGRESS NOTES
Writer spoke with Chicora regarding pt, discharge follow up appointment and venous duplex of pt's RUE. Amaya Wagner for discharge from cardiology; follow up appointment made.   Aiden Herrera RN  1/17/2022

## 2022-01-18 ENCOUNTER — CARE COORDINATION (OUTPATIENT)
Dept: CASE MANAGEMENT | Age: 52
End: 2022-01-18

## 2022-01-18 DIAGNOSIS — R09.81 NASAL CONGESTION: ICD-10-CM

## 2022-01-18 NOTE — FLOWSHEET NOTE
01/17/22 0810 01/17/22 1130   Treatment   Time On 0820  --    Time Off  --  1128   Treatment Goal 3000  --    Vital Signs   BP (!) 111/51 (!) 113/48   Temp 97.8 °F (36.6 °C) 98 °F (36.7 °C)   Pulse 75 83   Resp 18 18   Weight 267 lb 10.2 oz (121.4 kg) 259 lb 4.2 oz (117.6 kg)   Weight Method Bed scale Bed scale   Percent Weight Change 0.83 -3.13   Dry Weight 255 lb 11.7 oz (116 kg)  --    Treatment Initiation   Dialyze Hours 3  --    Post-Hemodialysis Assessment   Rinseback Volume (ml)  --  400 ml   Total Liters Processed (l/min)  --  69.9 l/min   Dialyzer Clearance  --  Lightly streaked   Duration of Treatment (minutes)  --  180 minutes   Hemodialysis Output (ml)  --  3474 ml   NET Removed (ml)  --  3074 ml   Tolerated 3 hour HD tx good, net UF 3 Liters. Became nauseated last hour of tx and resolved with fluid and lowering goal.  VSS through out tx. Right CW TDC fxs well reversed, unable to aspirate arterial lumen. Discharge today pending doppler study on arm which was done during dialysis.

## 2022-01-18 NOTE — CARE COORDINATION
Care Transitions Outreach Attempt    Call within 2 business days of discharge: Yes   Attempted to reach patient for 1st attempt at 24 hr  transitions of care follow up. Unable to reach patient. Left HIPPA Compliant VM. Nick Velez LPN, [facility]: [phone]      Patient: Chuy Davis Patient : 1970 MRN: [id number]    Last Discharge [address] [address]       Complaint Diagnosis Description Type Department Provider    22 Shortness of Breath Acute respiratory failure with hypoxia ([facility] . .. ED to Hosp-Admission (Discharged) (ADMITTED) Evy Munson MD; Katrin Parker . .. Was this an external facility discharge?  No Discharge Facility:     Noted following upcoming appointments from discharge chart review:   [facility] follow up appointment(s):   Future Appointments   Date Time Provider Nelly Darby   2022 10:30 AM [address],  [facility] Pradip Bowers   2/10/2022  2:30 PM JAY Miller - ILAN RAY     Non-[facility] follow up appointment(s):

## 2022-01-19 ENCOUNTER — FOLLOWUP TELEPHONE ENCOUNTER (OUTPATIENT)
Dept: TELEMETRY | Age: 52
End: 2022-01-19

## 2022-01-19 ENCOUNTER — CARE COORDINATION (OUTPATIENT)
Dept: CASE MANAGEMENT | Age: 52
End: 2022-01-19

## 2022-01-19 RX ORDER — FLUTICASONE PROPIONATE 50 MCG
SPRAY, SUSPENSION (ML) NASAL
Qty: 48 G | Refills: 0 | Status: SHIPPED | OUTPATIENT
Start: 2022-01-19 | End: 2022-04-17

## 2022-01-19 NOTE — CARE COORDINATION
[address] Transitions Initial Follow Up Call    Call within 2 business days of discharge: Yes    Patient: Glendy Dugan Patient : 1970   MRN: [id number]  Reason for Admission: NSTEMI   Discharge Date: 22 RARS: Readmission Risk Score: 40.4 ( )      Last Discharge [facility]       Complaint Diagnosis Description Type Department Provider    22 Shortness of Breath Acute respiratory failure with hypoxia ([address] . .. ED to Hosp-Admission (Discharged) (ADMITTED) Yvonne Munson MD; Hazel Martin . .. Final attempt made to reach patient for post hospital follow up call. Left a voice message for patient with my contact information and informed of final outreach attempt.            Care Transitions 24 Hour Call    Do you have all of your prescriptions and are they filled?: Yes  Post Acute Services:  [facility] Transitions Interventions         Follow Up  Future Appointments   Date Time Provider Nelly Darby   2022 10:30 AM Mckay Melton MD [facility] [address]   2/10/2022  2:30 PM JAY Ricci - ILAN NELSONN, RN, [facility]  Care Transition Nurse  [phone] mobile

## 2022-01-19 NOTE — TELEPHONE ENCOUNTER
First attempt at hospital follow up. VM box full and unable to leave message. Will attempt again at another time.  Myesha Shi RN

## 2022-01-20 ENCOUNTER — FOLLOWUP TELEPHONE ENCOUNTER (OUTPATIENT)
Dept: TELEMETRY | Age: 52
End: 2022-01-20

## 2022-01-20 NOTE — TELEPHONE ENCOUNTER
Third and final attempt at hospital follow up. No answer. VM Box full and unable to leave message.  Zohaib Hagen RN

## 2022-01-20 NOTE — TELEPHONE ENCOUNTER
Second attempt at hospital follow up. Mailbox full and unable to leave VM. Will attempt again at a later time. Zohaib Hagen RN

## 2022-01-24 DIAGNOSIS — F39 MOOD DISORDER (HCC): ICD-10-CM

## 2022-01-24 DIAGNOSIS — G47.00 INSOMNIA, UNSPECIFIED TYPE: ICD-10-CM

## 2022-01-24 DIAGNOSIS — G89.4 CHRONIC PAIN SYNDROME: ICD-10-CM

## 2022-01-24 RX ORDER — CYCLOBENZAPRINE HCL 10 MG
TABLET ORAL
Qty: 90 TABLET | Refills: 2 | Status: SHIPPED | OUTPATIENT
Start: 2022-01-24 | End: 2022-03-07 | Stop reason: SDUPTHER

## 2022-01-25 RX ORDER — QUETIAPINE FUMARATE 50 MG/1
TABLET, FILM COATED ORAL
Qty: 30 TABLET | Refills: 2 | Status: ON HOLD | OUTPATIENT
Start: 2022-01-25 | End: 2022-04-27 | Stop reason: HOSPADM

## 2022-02-01 ENCOUNTER — APPOINTMENT (OUTPATIENT)
Dept: CT IMAGING | Age: 52
DRG: 205 | End: 2022-02-01
Payer: COMMERCIAL

## 2022-02-01 ENCOUNTER — ANCILLARY PROCEDURE (OUTPATIENT)
Dept: EMERGENCY DEPT | Age: 52
DRG: 205 | End: 2022-02-01
Payer: COMMERCIAL

## 2022-02-01 ENCOUNTER — HOSPITAL ENCOUNTER (INPATIENT)
Age: 52
LOS: 1 days | Discharge: HOME | DRG: 205 | End: 2022-02-04
Attending: EMERGENCY MEDICINE | Admitting: INTERNAL MEDICINE
Payer: COMMERCIAL

## 2022-02-01 ENCOUNTER — APPOINTMENT (OUTPATIENT)
Dept: GENERAL RADIOLOGY | Age: 52
DRG: 205 | End: 2022-02-01
Payer: COMMERCIAL

## 2022-02-01 DIAGNOSIS — R06.02 SHORTNESS OF BREATH: Primary | ICD-10-CM

## 2022-02-01 DIAGNOSIS — I50.9 CONGESTIVE HEART FAILURE, UNSPECIFIED HF CHRONICITY, UNSPECIFIED HEART FAILURE TYPE (HCC): ICD-10-CM

## 2022-02-01 DIAGNOSIS — R77.8 ELEVATED TROPONIN: ICD-10-CM

## 2022-02-01 DIAGNOSIS — N18.6 ESRD (END STAGE RENAL DISEASE) (HCC): ICD-10-CM

## 2022-02-01 LAB
A/G RATIO: 1.6 (ref 1.1–2.2)
ALBUMIN SERPL-MCNC: 3.7 G/DL (ref 3.4–5)
ALP BLD-CCNC: 131 U/L (ref 40–129)
ALT SERPL-CCNC: 9 U/L (ref 10–40)
ANION GAP SERPL CALCULATED.3IONS-SCNC: 17 MMOL/L (ref 3–16)
AST SERPL-CCNC: 8 U/L (ref 15–37)
BASE EXCESS VENOUS: -0.5 MMOL/L (ref -3–3)
BASOPHILS ABSOLUTE: 0.1 K/UL (ref 0–0.2)
BASOPHILS RELATIVE PERCENT: 0.7 %
BILIRUB SERPL-MCNC: <0.2 MG/DL (ref 0–1)
BUN BLDV-MCNC: 63 MG/DL (ref 7–20)
CALCIUM SERPL-MCNC: 8.6 MG/DL (ref 8.3–10.6)
CARBOXYHEMOGLOBIN: 2.8 % (ref 0–1.5)
CHLORIDE BLD-SCNC: 97 MMOL/L (ref 99–110)
CO2: 23 MMOL/L (ref 21–32)
CREAT SERPL-MCNC: 7 MG/DL (ref 0.9–1.3)
EKG ATRIAL RATE: 94 BPM
EKG DIAGNOSIS: NORMAL
EKG P AXIS: 27 DEGREES
EKG P-R INTERVAL: 170 MS
EKG Q-T INTERVAL: 388 MS
EKG QRS DURATION: 98 MS
EKG QTC CALCULATION (BAZETT): 485 MS
EKG R AXIS: -16 DEGREES
EKG T AXIS: 93 DEGREES
EKG VENTRICULAR RATE: 94 BPM
EOSINOPHILS ABSOLUTE: 0.2 K/UL (ref 0–0.6)
EOSINOPHILS RELATIVE PERCENT: 2.6 %
GFR AFRICAN AMERICAN: 10
GFR NON-AFRICAN AMERICAN: 8
GLUCOSE BLD-MCNC: 130 MG/DL (ref 70–99)
GLUCOSE BLD-MCNC: 251 MG/DL (ref 70–99)
GLUCOSE BLD-MCNC: 263 MG/DL (ref 70–99)
GLUCOSE BLD-MCNC: 312 MG/DL (ref 70–99)
GLUCOSE BLD-MCNC: 399 MG/DL (ref 70–99)
HCO3 VENOUS: 23.6 MMOL/L (ref 23–29)
HCT VFR BLD CALC: 26.6 % (ref 40.5–52.5)
HEMOGLOBIN: 8.9 G/DL (ref 13.5–17.5)
INR BLD: 1.02 (ref 0.88–1.12)
LACTIC ACID, SEPSIS: 1.6 MMOL/L (ref 0.4–1.9)
LYMPHOCYTES ABSOLUTE: 1.1 K/UL (ref 1–5.1)
LYMPHOCYTES RELATIVE PERCENT: 11.8 %
MCH RBC QN AUTO: 29.6 PG (ref 26–34)
MCHC RBC AUTO-ENTMCNC: 33.5 G/DL (ref 31–36)
MCV RBC AUTO: 88.4 FL (ref 80–100)
METHEMOGLOBIN VENOUS: 0.7 %
MONOCYTES ABSOLUTE: 0.6 K/UL (ref 0–1.3)
MONOCYTES RELATIVE PERCENT: 6.2 %
NEUTROPHILS ABSOLUTE: 7.1 K/UL (ref 1.7–7.7)
NEUTROPHILS RELATIVE PERCENT: 78.7 %
O2 SAT, VEN: 56 %
O2 THERAPY: ABNORMAL
PCO2, VEN: 36.2 MMHG (ref 40–50)
PDW BLD-RTO: 16.4 % (ref 12.4–15.4)
PERFORMED ON: ABNORMAL
PH VENOUS: 7.43 (ref 7.35–7.45)
PLATELET # BLD: 295 K/UL (ref 135–450)
PMV BLD AUTO: 8 FL (ref 5–10.5)
PO2, VEN: 29.2 MMHG (ref 25–40)
POTASSIUM REFLEX MAGNESIUM: 4.2 MMOL/L (ref 3.5–5.1)
PRO-BNP: ABNORMAL PG/ML (ref 0–124)
PROTHROMBIN TIME: 11.5 SEC (ref 9.9–12.7)
RBC # BLD: 3.01 M/UL (ref 4.2–5.9)
SODIUM BLD-SCNC: 137 MMOL/L (ref 136–145)
TCO2 CALC VENOUS: 25 MMOL/L
TOTAL PROTEIN: 6 G/DL (ref 6.4–8.2)
TROPONIN: 0.19 NG/ML
TROPONIN: 0.21 NG/ML
WBC # BLD: 9.1 K/UL (ref 4–11)

## 2022-02-01 PROCEDURE — 6360000002 HC RX W HCPCS: Performed by: INTERNAL MEDICINE

## 2022-02-01 PROCEDURE — 83605 ASSAY OF LACTIC ACID: CPT

## 2022-02-01 PROCEDURE — 94660 CPAP INITIATION&MGMT: CPT

## 2022-02-01 PROCEDURE — G0378 HOSPITAL OBSERVATION PER HR: HCPCS

## 2022-02-01 PROCEDURE — 94761 N-INVAS EAR/PLS OXIMETRY MLT: CPT

## 2022-02-01 PROCEDURE — 83880 ASSAY OF NATRIURETIC PEPTIDE: CPT

## 2022-02-01 PROCEDURE — 85025 COMPLETE CBC W/AUTO DIFF WBC: CPT

## 2022-02-01 PROCEDURE — 93308 TTE F-UP OR LMTD: CPT

## 2022-02-01 PROCEDURE — 96374 THER/PROPH/DIAG INJ IV PUSH: CPT

## 2022-02-01 PROCEDURE — 84484 ASSAY OF TROPONIN QUANT: CPT

## 2022-02-01 PROCEDURE — 99222 1ST HOSP IP/OBS MODERATE 55: CPT | Performed by: INTERNAL MEDICINE

## 2022-02-01 PROCEDURE — 82803 BLOOD GASES ANY COMBINATION: CPT

## 2022-02-01 PROCEDURE — 85610 PROTHROMBIN TIME: CPT

## 2022-02-01 PROCEDURE — 96372 THER/PROPH/DIAG INJ SC/IM: CPT

## 2022-02-01 PROCEDURE — 6370000000 HC RX 637 (ALT 250 FOR IP): Performed by: INTERNAL MEDICINE

## 2022-02-01 PROCEDURE — 6360000002 HC RX W HCPCS: Performed by: EMERGENCY MEDICINE

## 2022-02-01 PROCEDURE — 80053 COMPREHEN METABOLIC PANEL: CPT

## 2022-02-01 PROCEDURE — 2500000003 HC RX 250 WO HCPCS: Performed by: INTERNAL MEDICINE

## 2022-02-01 PROCEDURE — 6360000004 HC RX CONTRAST MEDICATION: Performed by: EMERGENCY MEDICINE

## 2022-02-01 PROCEDURE — 36415 COLL VENOUS BLD VENIPUNCTURE: CPT

## 2022-02-01 PROCEDURE — 94640 AIRWAY INHALATION TREATMENT: CPT

## 2022-02-01 PROCEDURE — 93005 ELECTROCARDIOGRAM TRACING: CPT | Performed by: EMERGENCY MEDICINE

## 2022-02-01 PROCEDURE — 71260 CT THORAX DX C+: CPT

## 2022-02-01 PROCEDURE — 71045 X-RAY EXAM CHEST 1 VIEW: CPT

## 2022-02-01 PROCEDURE — 93010 ELECTROCARDIOGRAM REPORT: CPT | Performed by: INTERNAL MEDICINE

## 2022-02-01 PROCEDURE — 99284 EMERGENCY DEPT VISIT MOD MDM: CPT

## 2022-02-01 PROCEDURE — 2700000000 HC OXYGEN THERAPY PER DAY

## 2022-02-01 RX ORDER — HEPARIN SODIUM 5000 [USP'U]/ML
5000 INJECTION, SOLUTION INTRAVENOUS; SUBCUTANEOUS EVERY 8 HOURS SCHEDULED
Status: DISCONTINUED | OUTPATIENT
Start: 2022-02-01 | End: 2022-02-04 | Stop reason: HOSPADM

## 2022-02-01 RX ORDER — LISINOPRIL 10 MG/1
5 TABLET ORAL DAILY
Status: DISCONTINUED | OUTPATIENT
Start: 2022-02-01 | End: 2022-02-04 | Stop reason: HOSPADM

## 2022-02-01 RX ORDER — ONDANSETRON 2 MG/ML
4 INJECTION INTRAMUSCULAR; INTRAVENOUS EVERY 6 HOURS PRN
Status: DISCONTINUED | OUTPATIENT
Start: 2022-02-01 | End: 2022-02-04 | Stop reason: HOSPADM

## 2022-02-01 RX ORDER — INSULIN GLARGINE 100 [IU]/ML
30 INJECTION, SOLUTION SUBCUTANEOUS NIGHTLY
Status: DISCONTINUED | OUTPATIENT
Start: 2022-02-01 | End: 2022-02-04 | Stop reason: HOSPADM

## 2022-02-01 RX ORDER — ONDANSETRON 4 MG/1
4 TABLET, ORALLY DISINTEGRATING ORAL EVERY 8 HOURS PRN
Status: DISCONTINUED | OUTPATIENT
Start: 2022-02-01 | End: 2022-02-04 | Stop reason: HOSPADM

## 2022-02-01 RX ORDER — FLUTICASONE PROPIONATE 50 MCG
2 SPRAY, SUSPENSION (ML) NASAL DAILY
Status: DISCONTINUED | OUTPATIENT
Start: 2022-02-01 | End: 2022-02-04 | Stop reason: HOSPADM

## 2022-02-01 RX ORDER — METOPROLOL SUCCINATE 50 MG/1
50 TABLET, EXTENDED RELEASE ORAL DAILY
Status: DISCONTINUED | OUTPATIENT
Start: 2022-02-01 | End: 2022-02-04 | Stop reason: HOSPADM

## 2022-02-01 RX ORDER — SODIUM CHLORIDE 0.9 % (FLUSH) 0.9 %
10 SYRINGE (ML) INJECTION PRN
Status: DISCONTINUED | OUTPATIENT
Start: 2022-02-01 | End: 2022-02-04 | Stop reason: HOSPADM

## 2022-02-01 RX ORDER — CALCIUM CARBONATE 200(500)MG
1000 TABLET,CHEWABLE ORAL 3 TIMES DAILY PRN
Status: DISCONTINUED | OUTPATIENT
Start: 2022-02-01 | End: 2022-02-04 | Stop reason: HOSPADM

## 2022-02-01 RX ORDER — CALCIUM ACETATE 667 MG/1
1 CAPSULE ORAL
Status: DISCONTINUED | OUTPATIENT
Start: 2022-02-01 | End: 2022-02-04 | Stop reason: HOSPADM

## 2022-02-01 RX ORDER — NICOTINE POLACRILEX 4 MG
15 LOZENGE BUCCAL PRN
Status: DISCONTINUED | OUTPATIENT
Start: 2022-02-01 | End: 2022-02-04 | Stop reason: HOSPADM

## 2022-02-01 RX ORDER — SODIUM CHLORIDE 9 MG/ML
25 INJECTION, SOLUTION INTRAVENOUS PRN
Status: DISCONTINUED | OUTPATIENT
Start: 2022-02-01 | End: 2022-02-04 | Stop reason: HOSPADM

## 2022-02-01 RX ORDER — TRAZODONE HYDROCHLORIDE 50 MG/1
50 TABLET ORAL NIGHTLY
Status: DISCONTINUED | OUTPATIENT
Start: 2022-02-01 | End: 2022-02-04 | Stop reason: HOSPADM

## 2022-02-01 RX ORDER — ACETAMINOPHEN 325 MG/1
650 TABLET ORAL EVERY 6 HOURS PRN
Status: DISCONTINUED | OUTPATIENT
Start: 2022-02-01 | End: 2022-02-04 | Stop reason: HOSPADM

## 2022-02-01 RX ORDER — CLOPIDOGREL BISULFATE 75 MG/1
75 TABLET ORAL DAILY
Status: DISCONTINUED | OUTPATIENT
Start: 2022-02-01 | End: 2022-02-04 | Stop reason: HOSPADM

## 2022-02-01 RX ORDER — DULOXETIN HYDROCHLORIDE 60 MG/1
60 CAPSULE, DELAYED RELEASE ORAL DAILY
Status: DISCONTINUED | OUTPATIENT
Start: 2022-02-01 | End: 2022-02-04 | Stop reason: HOSPADM

## 2022-02-01 RX ORDER — MORPHINE SULFATE 4 MG/ML
4 INJECTION, SOLUTION INTRAMUSCULAR; INTRAVENOUS ONCE
Status: DISCONTINUED | OUTPATIENT
Start: 2022-02-01 | End: 2022-02-01

## 2022-02-01 RX ORDER — ACETAMINOPHEN 650 MG/1
650 SUPPOSITORY RECTAL EVERY 6 HOURS PRN
Status: DISCONTINUED | OUTPATIENT
Start: 2022-02-01 | End: 2022-02-04 | Stop reason: HOSPADM

## 2022-02-01 RX ORDER — OXYCODONE AND ACETAMINOPHEN 7.5; 325 MG/1; MG/1
1 TABLET ORAL EVERY 6 HOURS PRN
Status: DISCONTINUED | OUTPATIENT
Start: 2022-02-01 | End: 2022-02-04 | Stop reason: HOSPADM

## 2022-02-01 RX ORDER — PANTOPRAZOLE SODIUM 40 MG/1
40 TABLET, DELAYED RELEASE ORAL
Status: DISCONTINUED | OUTPATIENT
Start: 2022-02-01 | End: 2022-02-04 | Stop reason: HOSPADM

## 2022-02-01 RX ORDER — ASPIRIN 81 MG/1
81 TABLET, CHEWABLE ORAL DAILY
Status: DISCONTINUED | OUTPATIENT
Start: 2022-02-01 | End: 2022-02-04 | Stop reason: HOSPADM

## 2022-02-01 RX ORDER — PRAVASTATIN SODIUM 40 MG
40 TABLET ORAL NIGHTLY
Status: DISCONTINUED | OUTPATIENT
Start: 2022-02-01 | End: 2022-02-04 | Stop reason: HOSPADM

## 2022-02-01 RX ORDER — POLYETHYLENE GLYCOL 3350 17 G/17G
17 POWDER, FOR SOLUTION ORAL DAILY PRN
Status: DISCONTINUED | OUTPATIENT
Start: 2022-02-01 | End: 2022-02-04 | Stop reason: HOSPADM

## 2022-02-01 RX ORDER — ALBUTEROL SULFATE 2.5 MG/3ML
2.5 SOLUTION RESPIRATORY (INHALATION) EVERY 4 HOURS PRN
Status: DISCONTINUED | OUTPATIENT
Start: 2022-02-01 | End: 2022-02-04 | Stop reason: HOSPADM

## 2022-02-01 RX ORDER — DEXTROSE MONOHYDRATE 25 G/50ML
12.5 INJECTION, SOLUTION INTRAVENOUS PRN
Status: DISCONTINUED | OUTPATIENT
Start: 2022-02-01 | End: 2022-02-01 | Stop reason: CLARIF

## 2022-02-01 RX ORDER — GABAPENTIN 100 MG/1
100 CAPSULE ORAL 3 TIMES DAILY
Status: DISCONTINUED | OUTPATIENT
Start: 2022-02-01 | End: 2022-02-04 | Stop reason: HOSPADM

## 2022-02-01 RX ORDER — QUETIAPINE FUMARATE 25 MG/1
50 TABLET, FILM COATED ORAL NIGHTLY
Status: DISCONTINUED | OUTPATIENT
Start: 2022-02-01 | End: 2022-02-04 | Stop reason: HOSPADM

## 2022-02-01 RX ORDER — DEXTROSE MONOHYDRATE 50 MG/ML
100 INJECTION, SOLUTION INTRAVENOUS PRN
Status: DISCONTINUED | OUTPATIENT
Start: 2022-02-01 | End: 2022-02-04 | Stop reason: HOSPADM

## 2022-02-01 RX ADMIN — INSULIN LISPRO 8 UNITS: 100 INJECTION, SOLUTION INTRAVENOUS; SUBCUTANEOUS at 11:32

## 2022-02-01 RX ADMIN — PRAVASTATIN SODIUM 40 MG: 40 TABLET ORAL at 21:08

## 2022-02-01 RX ADMIN — GABAPENTIN 100 MG: 100 CAPSULE ORAL at 13:08

## 2022-02-01 RX ADMIN — TIOTROPIUM BROMIDE AND OLODATEROL 2 PUFF: 3.124; 2.736 SPRAY, METERED RESPIRATORY (INHALATION) at 08:25

## 2022-02-01 RX ADMIN — DULOXETINE HYDROCHLORIDE 60 MG: 60 CAPSULE, DELAYED RELEASE ORAL at 09:30

## 2022-02-01 RX ADMIN — INSULIN LISPRO 3 UNITS: 100 INJECTION, SOLUTION INTRAVENOUS; SUBCUTANEOUS at 21:07

## 2022-02-01 RX ADMIN — HEPARIN SODIUM 5000 UNITS: 5000 INJECTION INTRAVENOUS; SUBCUTANEOUS at 09:05

## 2022-02-01 RX ADMIN — TRAZODONE HYDROCHLORIDE 50 MG: 50 TABLET ORAL at 21:08

## 2022-02-01 RX ADMIN — HEPARIN SODIUM 5000 UNITS: 5000 INJECTION INTRAVENOUS; SUBCUTANEOUS at 13:08

## 2022-02-01 RX ADMIN — OXYCODONE AND ACETAMINOPHEN 1 TABLET: 7.5; 325 TABLET ORAL at 21:08

## 2022-02-01 RX ADMIN — QUETIAPINE FUMARATE 50 MG: 25 TABLET ORAL at 21:08

## 2022-02-01 RX ADMIN — GABAPENTIN 100 MG: 100 CAPSULE ORAL at 21:08

## 2022-02-01 RX ADMIN — GABAPENTIN 100 MG: 100 CAPSULE ORAL at 09:30

## 2022-02-01 RX ADMIN — LISINOPRIL 5 MG: 10 TABLET ORAL at 09:05

## 2022-02-01 RX ADMIN — OXYCODONE AND ACETAMINOPHEN 1 TABLET: 7.5; 325 TABLET ORAL at 09:32

## 2022-02-01 RX ADMIN — IOPAMIDOL 75 ML: 755 INJECTION, SOLUTION INTRAVENOUS at 04:05

## 2022-02-01 RX ADMIN — ASPIRIN 81 MG 81 MG: 81 TABLET ORAL at 09:05

## 2022-02-01 RX ADMIN — HEPARIN SODIUM 5000 UNITS: 5000 INJECTION INTRAVENOUS; SUBCUTANEOUS at 21:07

## 2022-02-01 RX ADMIN — CLOPIDOGREL BISULFATE 75 MG: 75 TABLET ORAL at 09:04

## 2022-02-01 RX ADMIN — CALCIUM ACETATE 667 MG: 667 CAPSULE ORAL at 11:31

## 2022-02-01 RX ADMIN — HYDROMORPHONE HYDROCHLORIDE 0.5 MG: 1 INJECTION, SOLUTION INTRAMUSCULAR; INTRAVENOUS; SUBCUTANEOUS at 04:26

## 2022-02-01 RX ADMIN — INSULIN LISPRO 8 UNITS: 100 INJECTION, SOLUTION INTRAVENOUS; SUBCUTANEOUS at 17:31

## 2022-02-01 RX ADMIN — METOPROLOL SUCCINATE 50 MG: 50 TABLET, EXTENDED RELEASE ORAL at 09:04

## 2022-02-01 RX ADMIN — PANTOPRAZOLE SODIUM 40 MG: 40 TABLET, DELAYED RELEASE ORAL at 11:31

## 2022-02-01 RX ADMIN — INSULIN LISPRO 6 UNITS: 100 INJECTION, SOLUTION INTRAVENOUS; SUBCUTANEOUS at 09:10

## 2022-02-01 RX ADMIN — INSULIN GLARGINE 30 UNITS: 100 INJECTION, SOLUTION SUBCUTANEOUS at 21:07

## 2022-02-01 RX ADMIN — ALBUTEROL SULFATE 2.5 MG: 2.5 SOLUTION RESPIRATORY (INHALATION) at 18:26

## 2022-02-01 ASSESSMENT — PAIN SCALES - GENERAL
PAINLEVEL_OUTOF10: 0
PAINLEVEL_OUTOF10: 8
PAINLEVEL_OUTOF10: 9
PAINLEVEL_OUTOF10: 5
PAINLEVEL_OUTOF10: 0

## 2022-02-01 ASSESSMENT — PAIN DESCRIPTION - PAIN TYPE
TYPE: ACUTE PAIN

## 2022-02-01 ASSESSMENT — PAIN DESCRIPTION - FREQUENCY: FREQUENCY: CONTINUOUS

## 2022-02-01 ASSESSMENT — PAIN DESCRIPTION - LOCATION: LOCATION: CHEST

## 2022-02-01 ASSESSMENT — PAIN DESCRIPTION - DESCRIPTORS
DESCRIPTORS: DISCOMFORT
DESCRIPTORS: DISCOMFORT

## 2022-02-01 NOTE — ED NOTES
Morphine drawn by this RN. Patient refused due to side effect of nausea patient experiences. Information relayed to Shakila Washington MD for new orders. This RN and Shemar Fairchild RN witnessed waste of medication in medication room of 4mg of Morphine. Medication wasted in omnicell by this RN.      Rick Mayer RN  02/01/22 9698       Rick Mayer RN  02/01/22 3468

## 2022-02-01 NOTE — CONSULTS
does not have any significant fever or chills, patient was having increased chest tightness, patient did not have any otalgia no ear discharge, patient does not have any sore throat or difficulty in swallowing, patient's wife had cold-like symptoms recently, patient on questioning further does not have any significant nausea or vomiting, patient did not have any increasing leg edema, patient states that he is at his dry weight at this time, patient had dialysis done about 2 days back and 2.3 kg was removed and patient is post diuresis weight was 120 kg; patient has history of ZAINAB and uses CPAP at home but patient has not brought his machine to the hospital, patient was alert and communicative when seen, no other pertinent review of system of concern     Patient Active Problem List    Diagnosis Date Noted    Hypotension due to drugs 11/25/2017    Acute on chronic combined systolic and diastolic heart failure ([address]     Ischemic cardiomyopathy     Dyslipidemia     Type 2 diabetes, uncontrolled, with neuropathy ([address] 03/04/2014    Bicuspid aortic valve 06/03/2013    CAD S/P percutaneous coronary angioplasty 05/14/2013    PVD (peripheral vascular disease) ([address] 05/14/2013    S/P TAVR (transcatheter aortic valve replacement) 10/19/2019    Essential hypertension 11/14/2013    Asthma-COPD overlap syndrome ([address] 05/14/2013    NSTEMI (non-ST elevated myocardial infarction) ([address] 01/12/2022    Acute respiratory failure with hypercapnia ([address] 11/30/2021    Acute pulmonary edema ([address] 11/30/2021    Obesity, Class II, BMI 35-39.9 11/30/2021    Grade II diastolic dysfunction 98/42/2389    Shock circulatory ([address] 11/30/2021    Smoker 11/30/2021    Normocytic normochromic anemia 11/30/2021    Respiratory failure with hypoxia ([address] 11/29/2021    Speech problem     Urinary tract infection with hematuria     Acute cerebrovascular accident (CVA) ([address] 09/07/2021    Acute hypoxemic respiratory failure ([address] 08/12/2021    Type 2 diabetes mellitus with hyperglycemia ([address] 06/27/2021    Acute encephalopathy 06/27/2021    Cellulitis and abscess of hand 04/24/2021    Iliac artery occlusion, right (HCC)     Cellulitis of right foot 01/29/2021    Peripheral vascular occlusive disease ([address] 01/02/2021    Ataxia     Weakness of both lower extremities 12/30/2020    Sinus bradycardia 12/30/2020    Sinus pause     GBS (Guillain-[place]ho[place] syndrome) ([facility])     Bilateral leg weakness 12/04/2020    Bradycardia 10/19/2019    Syncope and collapse 10/19/2019    Atrial fibrillation ([address]     Hypercholesteremia 07/30/2019    Chronic bronchitis ([facility]) 05/21/2019    Nasal congestion 05/21/2019    Chronic, continuous use of opioids 04/15/2019    Steal syndrome of dialysis vascular access ([facility])     SOB (shortness of breath) on exertion 12/24/2018    Anemia of chronic disease 11/12/2018    Pulmonary edema 11/09/2018    Fluid overload 11/09/2018    Renovascular hypertension     Mixed hyperlipidemia     Cigarette nicotine dependence in remission     Neuromuscular disorder ([facility])     Acute diastolic CHF (congestive heart failure) ([address] 03/18/2018    Hemodialysis-associated hypotension 11/22/2017    ESRD (end stage renal disease) on dialysis ([address] 11/22/2017    Mucus plugging of bronchi     Nonrheumatic aortic valve stenosis     Pleural effusion     Chronic anemia     Arterial ischemic stroke, ICA, right, acute ([address]     Type 2 diabetes mellitus without complication, without long-term current use of insulin ([address]     ZAINAB (obstructive sleep apnea)     Tobacco abuse 05/21/2017    CVA (cerebral vascular accident) ([address] 05/21/2017    Angina, class IV ([address]     Dyspnea     Gastroparesis due to DM ([address]     Biliary colic 11/66/8334    Symptomatic cholelithiasis 01/04/2017    Degeneration of lumbar or lumbosacral intervertebral disc 03/18/2016    Lumbar radiculopathy 03/18/2016    Lumbosacral spondylosis without myelopathy 03/18/2016    ZAINAB on CPAP 02/11/2016    Obesity (BMI 30-39. 9)     Pneumonia of right upper lobe due to infectious organism 03/28/2015    Depression with anxiety 06/05/2014    Passive smoke exposure 05/30/2014    Diabetic neuropathy ([address] 11/14/2013    Claudication in peripheral vascular disease ([address] 06/26/2013    Bilateral hilar adenopathy syndrome 06/03/2013       Past Medical History:   Diagnosis Date    Ambulatory dysfunction     walker for long distances, SOB with distance    Aortic stenosis     echo 2017    Arthritis     hands and hips    Asthma     Bilateral hilar adenopathy syndrome 6/3/2013    CAD (coronary artery disease)     Dr. Jenifer Ray [facility]) 04/19/2019    EF= 43%    CHF (congestive heart failure) (HCC)     Chronic pain     COPD (chronic obstructive pulmonary disease) ([address]     pulmonology Dr. Jatin Boothe    Depression     Diabetes mellitus ([address]     borderline    Difficult intravenous access     Emphysema of lung ([address]     ESRD (end stage renal disease) on dialysis ([address]     MWF    Fear of needles     Gastric ulcer     GERD (gastroesophageal reflux disease)     Heart valve problem     bicuspic valve    Hemodialysis patient ([address]     History of spinal fracture     work incident    Hx of blood clots     Bilateral lower extremities; stents in place    Hyperlipidemia     Hypertension     MI (myocardial infarction) ([address] 2019    has had 9 MIs. 2019 was the last    Neuromuscular disorder ([address]     due to CVA    Numbness and tingling in left arm     from fistula    Pneumonia     PONV (postoperative nausea and vomiting)     Prolonged emergence from general anesthesia     States requires more medication than most people    Sleep apnea     Uses CPAP    Stroke ([address]     7mm thalamic cva 2017 deficts left side, left side weakness    TIA (transient ischemic attack)     Unspecified diseases of blood and blood-forming organs         Past Surgical History:   Procedure Laterality Date    AORTIC VALVE REPLACEMENT N/A 10/15/2019    TRANSCATHETER AORTIC VALVE REPLACEMENT FEMORAL APPROACH performed by Felix Meigs, MD at [address] Right 7/2/2019    PERITONEAL DIALYSIS CATHETER REMOVAL performed by Andrew Leyva MD at [address] [place] [address]  2/29/2015    WN    CORONARY ANGIOPLASTY WITH STENT PLACEMENT  05/26/15    CYST REMOVAL  08/14/2013    EXCISION CYSTS, NECK X2 AND ABDOMINAL benign    DIAGNOSTIC CARDIAC CATH LAB PROCEDURE      DIALYSIS FISTULA CREATION Left 10/30/2017    LEFT BRACHIAL CEPHALIC FISTULA    DIALYSIS FISTULA CREATION Left 3/27/2019    LIGATION  AV FISTULA performed by Matthew Romero MD at [facility]. [address], COLON, DIAGNOSTIC      OTHER SURGICAL HISTORY  02/01/2017    laparoscopic cholecystectomy with intraoperative cholangiogram    OTHER SURGICAL HISTORY  2018    PORT PLACEMENT  - vas cath    OTHER SURGICAL HISTORY Bilateral 06/26/2018    laprascopic peritoneal dialysis catheter placement    OTHER SURGICAL HISTORY Right 09/2018    peritoneal dialysis port placed on right side of abdomen    OTHER SURGICAL HISTORY  05/28/2019    PTA/Stenting R External Iliac artery    VT LAP INSERTION TUNNELED INTRAPERITONEAL CATHETER N/A 9/21/2018    LAPAROSCOPIC PERITONEAL DIALYSIS CATHETER REPLACEMENT performed by Andrew Leyva MD at [address] [place] [address]  01/06/2016    UPPER GASTROINTESTINAL ENDOSCOPY  01/29/2017    possible candida, otherwise normal appearing    VASCULAR SURGERY  aprx 2 years ago    2 stents placed, each side of groin        Family History   Problem Relation Age of Onset    Diabetes Mother     Heart Disease Father     Kidney Disease Sister         stage 4-kidney failure    Cancer Sister     Heart Disease Sister     Obesity Sister     Cancer Sister     Heart Disease Sister     Obesity Sister    [facility] Alcohol Abuse Brother         Social History     Tobacco Use    Smoking status: Former Smoker     Packs/day: 0.50     Years: 33.00     Pack years: 16.50     Types: Cigarettes     Quit date: 2020     Years since quittin.7    Smokeless tobacco: Never Used    Tobacco comment: States quit 2021   Substance Use Topics    Alcohol use: Not Currently     Alcohol/week: 0.0 standard drinks     Comment: occ        Allergies   Allergen Reactions    Morphine Nausea And Vomiting               Physical Exam:  Blood pressure (!) 152/66, pulse 88, temperature 99.9 °F (37.7 °C), temperature source Oral, resp. rate 14, height 5' 9\" (1.753 m), weight 260 lb (117.9 kg), SpO2 100 %.'     Constitutional:  No acute distress.   HENT: No facial asymmetry. No thyromegaly. Eyes:  Conjunctivae are normal. Pupils equal, round, and reactive to light. No scleral icterus. Neck: . No tracheal deviation present. No obvious thyroid mass.  Short enlarged neck  Cardiovascular: Normal rate, regular rhythm, normal heart sounds.  No right ventricular heave some lower extremity edema. Pulmonary/Chest: No wheezes. Minimal bilateral rales.  Chest wall is not dull to percussion.  No accessory muscle usage or stridor. Decreased breath sound density, HD catheter present  Abdominal: Soft. Bowel sounds present. No distension or hernia. No tenderness. ObeseMusculoskeletal: No cyanosis. No clubbing. No obvious joint deformity. Lymphadenopathy: No cervical or supraclavicular adenopathy. Skin: Skin is warm and dry. No rash or nodules on the exposed extremities. ,  Tattoos present  Neurologic:  Alert and communicative with no focal cranial nerve deficits         Results:  CBC:   Recent Labs     22   WBC 9.1   HGB 8.9*   HCT 26.6*   MCV 88.4        BMP:   Recent Labs     22      K 4.2   CL 97*   CO2 23   BUN 63*   CREATININE 7.0*     LIVER PROFILE:   Recent Labs     22   AST 8*   ALT 9* BILITOT <0.2   ALKPHOS 131*     PT/INR:   Recent Labs     02/01/22  0245   PROTIME 11.5   INR 1.02       Imaging:  I have reviewed radiology images personally. CT CHEST PULMONARY EMBOLISM W CONTRAST   Final Result   Minimal vascular congestion without edema. No identified pulmonary embolism. Interval development of a 4-5 mm noncalcified left lower lobe nodule. This   could be followed annually as indicated per Fleischner society criteria. Interval development of minimally prominent lymph nodes. These are favored   to be reactive but follow-up in 6 months recommended to ensure stability,   sooner if indicated clinically. RECOMMENDATIONS:   Unavailable         XR CHEST PORTABLE   Final Result   Mild interstitial prominence which likely represents mild vascular congestion   or interstitial edema. Atypical or viral infection less likely. Short-term   follow-up recommended if symptoms persist.         POC US [address] [place] [address]   Final Result        Echo Limited    Result Date: 1/12/2022  Transthoracic Echocardiography Report (TTE)  Demographics   Patient Name       Quintin Castro   Date of Study      01/12/2022         Gender              Male   Patient Number     [id number]         Date of Birth       1970   Visit Number       [id number]          Age                 46 year(s)   Accession Number   [id number]         Room Number         2308   Corporate ID       [id number]            Sonographer         Brett Rodriguez, RT   Ordering Physician Mariela Epps,  Interpreting        [address] [facility]                 Physician           Desiree Durham MD, [facility]  Procedure Type of Study   TTE procedure:ECHOCARDIOGRAM LIMITED. Procedure Date Date: 01/12/2022 Start: 08:05 AM Study Location: [address] [place] [address] - Echo Lab Technical Quality: Adequate visualization Indications:Myocardial infarction. Patient Status: Routine Height: 69 inches Weight: 252.01 pounds BSA: 2.28 m2 BMI: 37.21 kg/m2 BP: 203/106 mmHg  Conclusions   Summary  Limited only f/u for LVEF and RVF. The left ventricular systolic function is mildly reduced with an ejection  fraction of 40-45 %. There is hypokinesis of the apex and inferior walls. There is a very small circumferential pericardial effusion noted. No tamponade physiology. The right ventricle is normal in size and function. Signature   ------------------------------------------------------------------  Electronically signed by Yara Lui MD, [facility]  (Interpreting physician) on 01/12/2022 at 12:21 PM  ------------------------------------------------------------------   Findings   Left Ventricle  The left ventricular systolic function is mildly reduced with an ejection  fraction of 40-45 %. There is hypokinesis of the apex and inferior walls. The left ventricle was not well visualized. Right Ventricle  The right ventricle is normal in size and function. TAPSE is measured at 13 mm. S'prime velocity is measured at 7 cm/s. Right Atrium  The right atrium is normal in size at 13 cm2. Pericardial Effusion  There is a small circumferential pericardial effusion noted. No tamponade physiology. M-Mode/2D Measurements (cm)      XR CHEST PORTABLE    Result Date: 2/1/2022  EXAMINATION: ONE XRAY VIEW OF THE CHEST 2/1/2022 2:41 am COMPARISON: 01/12/2022 HISTORY: ORDERING SYSTEM PROVIDED HISTORY: SOB TECHNOLOGIST PROVIDED HISTORY: Reason for exam:->SOB Reason for Exam: sob FINDINGS: EKG leads overlie the chest.  Prosthetic heart valve again identified. Left-sided dialysis catheter remains in place. Heart size is normal.  No pneumothorax. There is some mild central interstitial prominence. No pneumothorax or effusion. Mild interstitial prominence which likely represents mild vascular congestion or interstitial edema. Atypical or viral infection less likely.   Short-term follow-up recommended if symptoms persist.     XR CHEST PORTABLE    Result Date: 1/12/2022  EXAMINATION: ONE XRAY VIEW OF THE CHEST 1/12/2022 1:14 am COMPARISON: 11/30/2021 HISTORY: ORDERING SYSTEM PROVIDED HISTORY: SOB TECHNOLOGIST PROVIDED HISTORY: Reason for exam:->SOB Reason for Exam: sob FINDINGS: Portable study is limited by by body habitus. Heart size is within normal limits. Status post TAVR. Hazy opacity at the lung bases likely represents superimposed soft tissues. No confluent airspace disease, pneumothorax or pleural effusion. There is a left IJ tunneled dialysis catheter in good position with the tip just below the cavoatrial junction. No acute bone finding. Portable study is limited by body habitus. Lungs are grossly clear. Status post TAVR. Left IJ tunneled dialysis catheter is in good position. CT CHEST PULMONARY EMBOLISM W CONTRAST    Result Date: 2/1/2022  EXAMINATION: CTA OF THE CHEST 2/1/2022 3:55 am TECHNIQUE: CTA of the chest was performed after the administration of intravenous contrast.  Multiplanar reformatted images are provided for review. MIP images are provided for review. Dose modulation, iterative reconstruction, and/or weight based adjustment of the mA/kV was utilized to reduce the radiation dose to as low as reasonably achievable. COMPARISON: Portable chest performed earlier today at 0230 hours and CT dated 10/31/2019 HISTORY: ORDERING SYSTEM PROVIDED HISTORY: SOB, rule out PE TECHNOLOGIST PROVIDED HISTORY: Reason for exam:->SOB, rule out PE Decision Support Exception - unselect if not a suspected or confirmed emergency medical condition->Emergency Medical Condition (MA) Reason for Exam: sob x 1 day Relevant Medical/Surgical History: cardaic stent, TAVR FINDINGS: Pulmonary Arteries: Pulmonary arteries are adequately opacified for evaluation. No evidence of intraluminal filling defect to suggest pulmonary embolism. Main pulmonary artery is normal in caliber. [id number]         Age                 46 year(s)   Accession Number   [id number]        Room Number         8801   Corporate ID       [id number]           Sonographer         Igor Wright RDMS, T   Ordering Physician Yovanny Gamble CNP  Interpreting        [facility]                                       Physician           Readers                                                           Era Diego MD  Procedure Type of Study:   Extremities Arteries:Upper Extremities Arterial Duplex, VL UPPER EXTREMITY  ARTERIES DUPLEX RIGHT. Vascular Sonographer Report  Indications for Study:Arm pain. Additional Indications:Patient complains of right wrist pain and swelling since right radial cath procedure performed on 1/15/22. Impressions Right Impression The right radial artery demonstrates normal triphasic flow with no evidence of pseudoaneurysm. The right radial veins demonstrate normal phasic flow with no evidence of thrombosis. Conclusions   Summary   Normal right radial artery and veins. Signature   ------------------------------------------------------------------  Electronically signed by Juan Miguel Isbell MD (Interpreting  physician) on 01/17/2022 at 02:46 PM  ------------------------------------------------------------------  Patient Status:Routine. Study [facility] - Vascular Lab. Technical Quality:Adequate visualization. Risk Factors   - The patient's risk factor(s) include: prior valve surgery/procedure,     chronic lung disease, renal failure currently treated with dialysis,     insulin-treated diabetes mellitus, dyslipidemia, obesity, treated arterial     hypertension and prior MI .   - The patient has a former tobacco history of 16 years .  Velocities are measured in cm/s ; Diameters are measured in mm Right Upper Extremities Duplex Measurements +-----------+----+----------------+ ! Location   ! PSV ! Wave Description! +-----------+----+----------------+ ! Prox [id number]. 8! Multiphasic     ! +-----------+----+----------------+    POC US [facility]    Result Date: 2/1/2022  POCUS_Cardiac:     Exam Information:         Exam type:  Clinically indicated     Indication(s) for Exam:         The exam was performed with the following indications[de-identified]  Chest Pain, Dyspnea, Concern for effusion     Views Obtained & Images Saved for These Views:          The pericardial sac, myocardium, 4 chambers, and IVC were identified using the following views[de-identified]  Subxiphoid, Parasternal long-axis, Apical 4-chamber     Findings:         Pericardial Effusion:  No pericardial effusion         Cardiac Activity:  Cardiac activity normal         LV function:  Indeterminate         RV diameter:  Normal     Interpretation:         Indeterminate Electronically signed by Mona Blue on Tuesday, February 1, 2022 at 2:59 AM    NM Cardiac Stress Test Nuclear Imaging    Result Date: 1/13/2022  Cardiac Perfusion Imaging  Demographics   Patient Name       Latesha Kenney   Date of Study      01/13/2022         Gender              Male   Patient Number     [id number]         Date of Birth       1970   Visit Number       [id number]          Age                 46 year(s)   Accession Number   [id number]         Room Number         8421   Corporate ID       [id number]            NM Technician       Jose Maria Whiteside, [address] Keyanna Solorio RN                 Physician           Slime Travis MD, Krysta Rosario RN   Ordering Physician Yara Lui MD, [facility]   The procedure was explained in detail to the patient. Risks,  complications and alternative treatments were reviewed. Written consent  was obtained. Procedure Procedure Type:   Nuclear Stress Test:Pharmacological, NM MYOCARDIAL SPECT REST EXERCISE OR  RX   [place] [facility] [place] [facility]   Indications: Abnormal rest ECG. Hospital Status: Inpatient. Height: 69 inches Weight: 252 pounds  Risk Factors   The patient risk factors include:prior PCI;obesity, cerebrovascular disease,  former tobacco use, treated hypercholesterolemia, treated hypertension,  family history of premature CAD, insulin treated diabetes mellitus, chronic  lung disease, prior valve surgery/procedure , dyslipidemia, renal failure  currently treated with dialysis , prior heart failure , prior MI and (pack  years: 12). Conclusions   Summary  Abnormal high risk myocardial perfusion study. There is reduced activity at rest and stress within the basal anterior,  apical, high lateral, and infero-basal segments which is similar at rest and  stress. There is no significant or clear ischemia. There is severe left ventricular dilatation. The estimated left ventricular function is 27%. Recommendation  These findings are consistent with a dilated non-ischemic cardiomyopathy. Recommend referral to advanced heart failure team.  Stress Protocols   Resting HR:85 bpm  Resting BP:142/80 mmHg  Stress Protocol:Pharmacologic - Lexiscan's  Peak HR:89 bpm                           HR/BP product:96239  Peak BP:173/85 mmHg  Predicted HR: 167 bpm  % of predicted HR: 53  Test duration: 4 min  Reason for termination:Completed   Symptoms  Patient developed shortness of breath , likely related to lexiscan. Symptoms resolved with rest.   Complications  Procedure complication was none.   Procedure Medications   - Lexiscan I.V. 0.4 mg.  Imaging Protocols   - One Day Rest                          Stress   Isotope:Myoview/Tetrofosmin   Isotope: Myoview/Tetrofosmin  Isotope dose:11 mCi           Isotope dose:31.9 mCi  Administration Route:I.V. Administration Route:I.V.  Date:01/13/2022 08:45         Date:01/13/2022 10:30                                 Technique:      Gated  Imaging Results   Applied corrections   - Attenuation correction  applied     Stress ejection    Ejection fraction:27 %    EDV :272 ml    ESV :199 ml    Stroke volume :73 ml    LV mass :245 gr  Medical History  Signatures   ------------------------------------------------------------------  Electronically signed by Jory Courtney MD, [facility]  (Interpreting physician) on 01/13/2022 at 14:26  ------------------------------------------------------------------    Results for Jeff Soto (MRN [id number]) as of 2/1/2022 19:53   Ref.  Range 1/17/2022 05:20 1/17/2022 07:52 1/17/2022 12:01 2/1/2022 02:45 2/1/2022 09:09 2/1/2022 11:22 2/1/2022 16:14   Sodium Latest Ref Range: 136 - 145 mmol/L 133 (L)   137      Potassium Latest Ref Range: 3.5 - 5.1 mmol/L 5.1   4.2      Chloride Latest Ref Range: 99 - 110 mmol/L 95 (L)   97 (L)      CO2 Latest Ref Range: 21 - 32 mmol/L 20 (L)   23      BUN Latest Ref Range: 7 - 20 mg/dL 61 (H)   63 (H)      Creatinine Latest Ref Range: 0.9 - 1.3 mg/dL 8.0 (HH)   7.0 (HH)      Anion Gap Latest Ref Range: 3 - 16  18 (H)   17 (H)      GFR Non- Latest Ref Range: >60  7 (A)   8 (A)      GFR  Latest Ref Range: >60  9 (A)   10 (A)      Magnesium Latest Ref Range: 1.80 - 2.40 mg/dL 2.00         Lactic Acid, Sepsis Latest Ref Range: 0.4 - 1.9 mmol/L    1.6      Glucose Latest Ref Range: 70 - 99 mg/dL 145 (H)   399 (H)      POC Glucose Latest Ref Range: 70 - 99 mg/dl  162 (H) 120 (H)  251 (H) 312 (H) 130 (H)   CALCIUM, SERUM, [id number] Latest Ref Range: 8.3 - 10.6 mg/dL 8.3   8.6      Total Protein Latest Ref Range: 6.4 - 8.2 g/dL    6.0 (L) Results for Bentley Berg (MRN [id number]) as of 2/1/2022 19:53   Ref. Range 11/29/2021 14:26 11/30/2021 01:46 11/30/2021 04:20 12/2/2021 04:17 1/12/2022 00:30 1/12/2022 12:00 1/16/2022 05:21 2/1/2022 02:45 2/1/2022 08:09 2/1/2022 10:46 2/1/2022 14:52   Pro-BNP Latest Ref Range: 0 - 124 pg/mL 39,503 (H)   34,022 (H) 57,050 (H)  33,575 (H) 31,652 (H)      Troponin Latest Ref Range: <0.01 ng/mL 0.12 (H) 0.09 (H) 0.08 (H)  0.25 (H) 0.39 (H)  0.21 (H) 0.21 (H) 0.21 (H) 0.19 (H)   Cholesterol, Total Latest Ref Range: 0 - 199 mg/dL   251 (H)           HDL Cholesterol Latest Ref Range: 40 - 60 mg/dL   53           LDL Calculated Latest Ref Range: <100 mg/dL   155 (H)           Triglycerides Latest Ref Range: 0 - 150 mg/dL   214 (H)           VLDL Cholesterol Calculated Latest Ref Range: Not Established mg/dL   43             Results for Bentley Berg (MRN [id number]) as of 2/1/2022 19:53   Ref.  Range 11/30/2021 08:08 11/30/2021 09:25 12/1/2021 04:17 12/2/2021 04:17 12/3/2021 07:16 1/12/2022 00:30 1/12/2022 12:00 1/15/2022 05:19 2/1/2022 02:45   WBC Latest Ref Range: 4.0 - 11.0 K/uL   10.6 11.2 (H) 9.9 11.8 (H)  8.3 9.1   RBC Latest Ref Range: 4.20 - 5.90 M/uL   2.95 (L) 3.55 (L) 3.72 (L) 3.48 (L)  2.95 (L) 3.01 (L)   Hemoglobin Quant Latest Ref Range: 13.5 - 17.5 g/dL 10.2 (L) 10.1 (L) 8.8 (L) 10.1 (L) 10.9 (L) 10.0 (L)  8.6 (L) 8.9 (L)   Hematocrit Latest Ref Range: 40.5 - 52.5 %   26.3 (L) 30.8 (L) 33.0 (L) 30.8 (L)  26.2 (L) 26.6 (L)   POC Hematocrit Latest Ref Range: 40.5 - 52.5 % 30.0 (L) 30.0 (L)          MCV Latest Ref Range: 80.0 - 100.0 fL   89.1 86.7 88.7 88.3  89.1 88.4   MCH Latest Ref Range: 26.0 - 34.0 pg   29.8 28.6 29.3 28.6  29.4 29.6   MCHC Latest Ref Range: 31.0 - 36.0 g/dL   33.5 33.0 33.1 32.4  33.0 33.5   MPV Latest Ref Range: 5.0 - 10.5 fL   8.6 8.4 8.2 7.9  7.9 8.0   RDW Latest Ref Range: 12.4 - 15.4 %   16.1 (H) 15.7 (H) 15.5 (H) 16.0 (H)  16.1 (H) 16.4 (H)   Platelet Count Latest Ref Range: 135 - 450 K/uL   222 260 260 355  256 295   Neutrophils % Latest Units: %      81.4   78.7   Lymphocyte % Latest Units: %      9.0   11.8   Monocytes % Latest Units: %      5.6   6.2   Eosinophils % Latest Units: %      3.1   2.6   Basophils % Latest Units: %      0.9   0.7   Neutrophils Absolute Latest Ref Range: 1.7 - 7.7 K/uL      9.6 (H)   7.1   Lymphocytes Absolute Latest Ref Range: 1.0 - 5.1 K/uL      1.1   1.1   Monocytes Absolute Latest Ref Range: 0.0 - 1.3 K/uL      0.7   0.6   Eosinophils Absolute Latest Ref Range: 0.0 - 0.6 K/uL      0.4   0.2   Basophils Absolute Latest Ref Range: 0.0 - 0.2 K/uL      0.1   0.1   Ferritin Latest Ref Range: 30.0 - 400.0 ng/mL       624.0 (H)     Iron Latest Ref Range: 59 - 158 ug/dL       46 (L)     Iron Saturation Latest Ref Range: 20 - 50 %       18 (L)     TIBC Latest Ref Range: 260 - 445 ug/dL       257 (L)       Results for Jeff Soto (MRN [id number]) as of 2/1/2022 19:53   Ref. Range 11/29/2021 23:15 1/12/2022 00:30   Rapid Influenza A Ag Latest Ref Range: Negative  Negative    Rapid Influenza B Ag Latest Ref Range: Negative  Negative    RAPID INFLUENZA A/B ANTIGENS Unknown Rpt    SARS-CoV-2, NAAT Latest Ref Range: Not Detected  Not Detected Not Detected      Results for Jeff Soto (MRN [id number]) as of 2/1/2022 19:53   Ref. Range 11/29/2021 18:44 11/30/2021 01:20 11/30/2021 02:29 11/30/2021 05:30 11/30/2021 06:12 11/30/2021 08:08 11/30/2021 09:25 12/1/2021 09:51 1/12/2022 00:30 2/1/2022 02:45   O2 Therapy Unknown Unknown  Unknown Unknown Unknown    Unknown Unknown   Hemoglobin, Art, Extended Latest Ref Range: 13.5 - 17.5 g/dL 11.7 (L)  11.6 (L) 11.2 (L) 12.6 (L)        pH, Arterial Latest Ref Range: 7.350 - 7.450  7. 196 (LL) 6.954 (LL) 6.970 (LL) 7.082 (LL) 7.137 (LL) 7.218 (L) 7.242 (L) 7.393     pCO2, Arterial Latest Ref Range: 35.0 - 45.0 mm Hg 50.2 (H) 107.0 (HH) 97.9 (HH) 82.5 (HH) 66.3 (H) 53.4 (H) 52.4 (H) 40.2     pO2, Arterial Latest Ref Range: 75.0 - 108.0 mm Hg 55.9 (L) 83.7 89.5 36.7 (LL) 94.2 112.0 (H) 93.8 79.7     HCO3, Arterial Latest Ref Range: 21.0 - 29.0 mmol/L 19.0 (L) 23.7 22.0 24.0 21.9 21.8 22.6 24.5     TCO2 (calc), Art Latest Ref Range: Not Established mmol/L 20.5 27 25.0 26.6 23.9 23 24 26     Base Excess, Arterial Latest Ref Range: -3 - 3  -9.0 (L) -8 (L) -11.1 (L) -7.0 (L) -8.0 (L) -6 (L) -5 (L) -1     O2 Sat, Arterial Latest Ref Range: 93 - 100 % 83.9 (L) 86 (L) 92.9 (L) 60.4 (L) 95.4 97 96 96     Methemoglobin, Arterial Latest Ref Range: <1.5 % 0.2  0.6 0.2 0.4        Carboxyhgb, Arterial Latest Ref Range: 0.0 - 1.5 % 0.7  0.2 0.6 0.2        pH, Blade Latest Ref Range: 7.350 - 7.450          7.398 7.432   pCO2, Blade Latest Ref Range: 40.0 - 50.0 mmHg         38.9 (L) 36.2 (L)   pO2, Blade Latest Ref Range: 25.0 - 40.0 mmHg         65.2 (H) 29.2   HCO3, Venous Latest Ref Range: 23.0 - 29.0 mmol/L         23.4 23.6   TC02 (Calc), Blade Latest Ref Range: Not Established mmol/L         25 25   Base Excess, Blade Latest Ref Range: -3.0 - 3.0 mmol/L         -1.2 -0.5   MetHgb, Blade Latest Ref Range: <1.5 %         0.3 0.7   O2 Sat, Blade Latest Ref Range: Not Established %         92 56   Sample Type Unknown  ART    ART ART ART     FIO2 Unknown  100.00             CTA OF THE CHEST 2/1/2022 3:55 am       TECHNIQUE:   CTA of the chest was performed after the administration of intravenous   contrast.  Multiplanar reformatted images are provided for review.  MIP   images are provided for review.  Dose modulation, iterative reconstruction,   and/or weight based adjustment of the mA/kV was utilized to reduce the   radiation dose to as low as reasonably achievable.       COMPARISON:   Portable chest performed earlier today at 0230 hours and CT dated 10/31/2019       HISTORY:   ORDERING SYSTEM PROVIDED HISTORY: SOB, rule out PE   TECHNOLOGIST PROVIDED HISTORY:   Reason for exam:->SOB, rule out PE   Decision Support Exception - unselect if not a suspected or confirmed   emergency medical condition->Emergency Medical Condition (MA)   Reason for Exam: sob x 1 day   Relevant Medical/Surgical History: cardaic stent, TAVR       FINDINGS:   Pulmonary Arteries: Pulmonary arteries are adequately opacified for   evaluation.  No evidence of intraluminal filling defect to suggest pulmonary   embolism.  Main pulmonary artery is normal in caliber.       Mediastinum: Patient is status post aortic valve replacement.  Mild   cardiomegaly.  No pericardial effusion.  No aortic dissection.  Scattered   vascular calcifications.  Multiple bilateral mediastinal and hilar lymph   nodes.  Many of these appear borderline to mildly enlarged.  1 example in the   right hilum measures 2 x 1.3 cm.  Upper right paratracheal lymph node   measures 2.1 x 1.1 cm.       Lungs/pleura: No pneumothorax or pleural effusion.  Minimal vascular   congestion without evidence of edema.  Pleural base calcified nodular density   in the posterior left lower lobe is unchanged.  A noncalcified nodule in the   base of the left lower lobe on series 3, image 69 is new and measures   approximately 4-5 mm.  No dominant lung mass identified.       Upper Abdomen: Cholecystectomy clips.  Low-attenuation left adrenal nodule is   unchanged measuring 1.8 cm and likely represents an adenoma.  Minimal   pancreatic calcifications suggestive of chronic pancreatitis.  Visualized   portions of the upper abdomen demonstrate no acute abnormality.       Soft Tissues/Bones: Degenerative changes are scattered in the spine.  Lower   thoracic vertebral bodies demonstrate minimal depression of superior   endplates.  These are unchanged.           Impression   Minimal vascular congestion without edema.       No identified pulmonary embolism.       Interval development of a 4-5 mm noncalcified left lower lobe nodule.  This   could be followed annually as indicated per Fleischner society criteria.       Interval development of minimally prominent lymph nodes.  These are favored   to be reactive but follow-up in 6 months recommended to ensure stability,   sooner if indicated clinically. Echocardiogram:1/12/22-Summary   Limited only f/u for LVEF and RVF. The left ventricular systolic function is mildly reduced with an ejection   fraction of 40-45 %. There is hypokinesis of the apex and inferior walls. There is a very small circumferential pericardial effusion noted. No tamponade physiology. The right ventricle is normal in size and function. ECHO in Sept 2021-Summary   Technically difficult examination. Image quality limits accurate wall motion   and ejection fraction analysis. The left ventricular systolic function is moderately reduced with an   ejection fraction of 40-45 %. Mild concentric left ventricular hypertrophy. Definity contrast administered with no evidence of left ventricular mass or   thrombus noted. Moderate global hypokinesis with regional variation. Grade II diastolic dysfunction with elevated filing pressure. Compared to last echo on 1/5/2021 (EF 55%), left ventricle systolic function   has decreased. The left atrium is mildly dilated. Individual aortic valve leaflets are not clearly visualized. Normally functioning TAVR 29 mm Langford vanita S3 bioprosthetic valve in   aortic position appears well seated with a max velocity of 2.27 m/sec,   maximum gradient of 21 mmHg and a mean gradient of 13 mmHg. Trace aortic valve regurgitation. Mild mitral regurgitation. A bubble study was performed and fails to show evidence of shunting. PFT:2016-INDICATIONS:  COPD. 1.  SPIROMETRY:  The FEV-1 is 1.71 L which is 44% predicted. The FEV-1/FVC  ratio is normal.  Inhaled bronchodilators are given. There is significant  improvement in the FEV-1 at 29%. 2.  LUNG VOLUMES:  Total lung capacity is normal at 6.08 L or 86% predicted.      3.  DIFFUSION CAPACITY:  DLCO is 28.26 mL per minute per mmHg which is 79%  predicted. 4.  FLOW VOLUME LOOP:  Does not show an obvious obstructive pattern. No variable or fixed obstruction pattern on the flow volume loop done in 2016     IMPRESSION:  There are symmetric reductions in the FEV-1 and the forced vital  capacity. There is also broncho reactivity. Air trapping is present. Mild  reduction in diffusion capacity is present. While not a typical obstructive  pattern, given the tobacco history, smoking related lung disease is still the  most likely culprit here. Assessment:  Principal Problem:    CAD S/P percutaneous coronary angioplasty  Active Problems:    Type 2 diabetes, uncontrolled, with neuropathy (HCC)    Ischemic cardiomyopathy    Asthma-COPD overlap syndrome ([facility] [place])    S/P TAVR (transcatheter aortic valve replacement)    Obesity (BMI 30-39.9)    ZAINAB on CPAP    Dyspnea    ESRD (end stage renal disease) on dialysis [facility])  Resolved Problems:    * No resolved hospital problems.  *          Plan:     · Oxygen supplementation,if required,  to keep saturation being 90-94% only  · Please titrate down the oxygen as per the above parameters  · BIPAP on intermittent basis -needs to continue and be complaint with it at home   · Pulmonary toilet  · Patient's x-ray chest shows patient to have mild pulmonary venous congestion and patient CT of the chest did not show any pneumonic process but has a lung nodule which is new along with reactive adenopathy  · Patient does not need any antibiotics from pulmonary standpoint of view  · HD as per nephrology  · Patient's PFT flow-volume loop in 2016 did not show any significant intrathoracic or extrathoracic or fixed obstruction at that time  · Patient will benefit from a repeat PFT as an outpatient  · Antihypertensives as per IM/nephrology   · Bronchodilators  · We will hold off on any systemic steroids at this time  · Lantus insulin as per blood glucose monitoring as per IM  · Blood glucose monitoring with sliding scale insulin  · Trend hemodynamics and cardiac rhythm closely  · Diet and life style modifications  · Patient needs to lose weight   · PUD and DVT prophylaxis    Case d/w patient and nursing           Electronically signed by:  Matthew Quarles MD    2/1/2022    9:34 AM.

## 2022-02-01 NOTE — PROGRESS NOTES
RN placed call to respiratory for patient requesting nebulizer treatment for complaints of shortness of breath.

## 2022-02-01 NOTE — H&P
Hospital Medicine History & Physical      Patient:  Renetta Sibley  :   1970  MRN:   [id number]  Date of Service: 22    Chief Complaint   Patient presents with    Shortness of Breath     started at 8p, +CP, hx mechanical valve, recent stent placement       HISTORY OF PRESENT ILLNESS:    Renetta Sibley is a 46 y.o. male. He presented to the ER from home by ambulance for dyspnea. He has multiple chronic medical problems and is hospitalized frequently (10 admissions during ). He was most recently hospitalized  -  for dyspnea. The major finding during his stay was a high-risk lexiscan stress MPI demonstrating LVEF of 27% but no definite reversible perfusion defects. He underwent LHC demonstrating subtotal occlusion of the mid RCA and underwent placement of a PATRICIA. His LVEDP was also elevated at 30 mmHg. He has a h/o ESRD on HD qMWF. He attended dialysis yesterday without incident. He became dyspnea yesterday evening around 8pm.  He tried using home nebulized bronchodilators which seemed to help but only briefly. He also tried using his home CPAP which didn't help. He activated EMS and was given SL NTG in the ambulance which also seemed to help for 10-15 minutes. He was receiving supplemental O2 earlier in his ER course which also seemed to help (currently on room air). He states \"it feels like I'm breathing through a straw. \"    Review of Systems:  All pertinent positives and negatives are as noted in the HPI section. All other systems were reviewed and are negative.     Past Medical History:   Diagnosis Date    Ambulatory dysfunction     walker for long distances, SOB with distance    Aortic stenosis     echo     Arthritis     hands and hips    Asthma     Bilateral hilar adenopathy syndrome 6/3/2013    CAD (coronary artery disease)     Dr. Javier Davidson [facility]) 2019    EF= 43%    [facility]     Chronic pain     COPD (chronic obstructive pulmonary disease) [facility])     pulmonology Dr. Mirta Pitts    Depression     Diabetes mellitus ([facility]     borderline    Difficult intravenous access     Emphysema of lung ([facility]     ESRD (end stage renal disease) on dialysis ([facility]     MWF    Fear of needles     Gastric ulcer     GERD (gastroesophageal reflux disease)     Heart valve problem     bicuspic valve    Hemodialysis patient ([facility]     History of spinal fracture     work incident    Hx of blood clots     Bilateral lower extremities; stents in place    Hyperlipidemia     Hypertension     MI (myocardial infarction) ([facility] 2019    has had 9 MIs. 2019 was the last    Neuromuscular disorder ([facility]     due to CVA    Numbness and tingling in left arm     from fistula    Pneumonia     PONV (postoperative nausea and vomiting)     Prolonged emergence from general anesthesia     States requires more medication than most people    Sleep apnea     Uses CPAP    Stroke ([facility]     7mm thalamic cva 2017 deficts left side, left side weakness    TIA (transient ischemic attack)     Unspecified diseases of blood and blood-forming organs        Past Surgical History:   Procedure Laterality Date    AORTIC VALVE REPLACEMENT N/A 10/15/2019    TRANSCATHETER AORTIC VALVE REPLACEMENT FEMORAL APPROACH performed by Babak Mccarty MD at [address] 7/2/2019    PERITONEAL DIALYSIS CATHETER REMOVAL performed by Katie Mae MD at [address] [place] [address]  2/29/2015    [facility]    CORONARY ANGIOPLASTY WITH STENT PLACEMENT  05/26/15    CYST REMOVAL  08/14/2013    EXCISION CYSTS, NECK X2 AND ABDOMINAL benign    DIAGNOSTIC CARDIAC CATH LAB PROCEDURE      DIALYSIS FISTULA CREATION Left 10/30/2017    LEFT BRACHIAL CEPHALIC FISTULA    DIALYSIS FISTULA CREATION Left 3/27/2019    LIGATION  AV FISTULA performed by Knena Fatima MD at [facility] [address], COLON, DIAGNOSTIC      OTHER SURGICAL HISTORY  02/01/2017    laparoscopic cholecystectomy with intraoperative cholangiogram    OTHER SURGICAL HISTORY  2018    PORT PLACEMENT  - vas cath    OTHER SURGICAL HISTORY Bilateral 06/26/2018    laprascopic peritoneal dialysis catheter placement    OTHER SURGICAL HISTORY Right 09/2018    peritoneal dialysis port placed on right side of abdomen    OTHER SURGICAL HISTORY  05/28/2019    PTA/Stenting R External Iliac artery    [place] LAP INSERTION TUNNELED INTRAPERITONEAL CATHETER N/A 9/21/2018    LAPAROSCOPIC PERITONEAL DIALYSIS CATHETER REPLACEMENT performed by Masoud Abad MD at [address] [facility]] ENDOSCOPY  01/06/2016    UPPER GASTROINTESTINAL ENDOSCOPY  01/29/2017    possible candida, otherwise normal appearing    VASCULAR SURGERY  aprx 2 years ago    2 stents placed, each side of groin         Prior to Admission medications    Medication Sig Start Date End Date Taking? Authorizing Provider   QUEtiapine (SEROQUEL) 50 MG tablet TAKE 1 TABLET BY MOUTH IN THE EVENING 1/25/22   Sony Martinez MD   cyclobenzaprine (FLEXERIL) 10 MG tablet TAKE 1 TABLET BY MOUTH EVERY 8 HOURS AS NEEDED 1/24/22   Sony Martinez MD   fluticasone Mariah Nic) 50 MCG/ACT nasal spray SHAKE LIQUID AND USE 2 SPRAYS IN [facility] NOSTRIL DAILY 1/19/22   Sony Martinez MD   aspirin 81 MG chewable tablet Take 1 tablet by mouth daily 1/18/22   Agustin Roberts MD   insulin glargine [facility]) 100 UNIT/ML injection pen Inject 30 Units into the skin nightly 1/17/22   Agustin Roberts MD   lisinopril (PRINIVIL;ZESTRIL) 5 MG tablet Take 1 tablet by mouth daily 1/18/22   Agustin Roberts MD   metoprolol succinate (TOPROL XL) 50 MG extended release tablet Take 1 tablet by mouth daily 1/18/22   Agustin Roberts MD   oxyCODONE-acetaminophen (PERCOCET) 7.5-325 MG per tablet Take 1 tablet by mouth every 6 hours as needed (pain) for up to 30 days.  1/10/22 2/9/22  Stacey Chris, NEEDED FOR CHEST PAIN EVERY 5 MINUTES UP TO 3 TIMES. IF NO RELIEF CALL 911. 1/7/21   Sony Martinez MD   insulin aspart (NOVOLOG FLEXPEN) 100 UNIT/ML injection pen Inject 20 Units into the skin 3 times daily (before meals) 10/12/20   Sony Martinez MD   blood glucose test strips (FREESTYLE LITE) strip Daily As needed. 8/19/19   Sony Martinez MD   vitamin D (ERGOCALCIFEROL) 37689 units CAPS capsule TK 1 C PO WEEKLY 6/2/19   Historical Provider, MD   Tiotropium Bromide-Olodaterol (STIOLTO RESPIMAT) 2.5-2.5 MCG/ACT AERS Inhale 2 puffs into the lungs daily 5/21/19   Judi Childs MD   Polyethylene Glycol 3350 GRAN  5/2/18   Historical Provider, MD   Glucose Blood (BLOOD GLUCOSE TEST STRIPS) STRP TEST 3-4 TIMES DAILY, AS DIRECTED  Patient not taking: Reported on 10/5/2021 4/25/18   Timur Nunes MD   Blood Glucose Monitoring Suppl ADAM USE AS DIRECTED. 4/25/18   Timur Nunes MD   Alcohol Swabs PADS USE AS DIRECTED 4/25/18   Timur Nunes MD   albuterol sulfate  (90 Base) MCG/ACT inhaler Inhale 2 puffs into the lungs every 6 hours as needed for Wheezing 11/8/17   Violet Garza MD   ipratropium-albuterol (DUONEB) 0.5-2.5 (3) MG/3ML SOLN nebulizer solution Inhale 3 mLs into the lungs every 6 hours as needed for Shortness of Breath 10/15/17   Jonathan Pitts MD   calcium carbonate (TUMS) 500 MG chewable tablet Take 1 tablet by mouth 3 times daily as needed for Heartburn. Historical Provider, MD       Allergies:   Morphine    Social:   reports that he quit smoking about 21 months ago. His smoking use included cigarettes. He has a 16.50 pack-year smoking history. He has never used smokeless tobacco.   reports previous alcohol use.   Social History     Substance and Sexual Activity   Drug Use No       Family History   Problem Relation Age of Onset    Diabetes Mother     Heart Disease Father     Kidney Disease Sister         stage 4-kidney failure    Cancer Sister     Heart Disease Sister    Erin Daly Obesity Sister     Cancer Sister     Heart Disease Sister     Obesity Sister     Alcohol Abuse Brother        PHYSICAL EXAM:  I performed this physical examination. Vitals:  Patient Vitals for the past 24 hrs:   BP Temp Temp src Pulse Resp SpO2 Height Weight   02/01/22 0321 (!) 137/118 -- -- 94 20 99 % -- --   02/01/22 0219 (!) 157/110 99.9 °F (37.7 °C) Oral 94 17 100 % 5' 9\" (1.753 m) 260 lb (117.9 kg)     Room air    GEN:  Appearance:  Obese male in NAD . Level of Consciousness:  alert . Orientation:  full    HEENT: Sclera anicteric.  no conjunctival chemosis. moist mucus membranes. no specific or diagnostic oral lesions. NECK:  no signs of meningismus. Jugular veins not discernible d/t body habitus. .  Carotid pulses  2+.  no cervical lymphadenopathy. no thyromegaly. There are stridulous upper airway breath sounds without john stridor. CHEST: Left upper chest tunneled HD catheter    CV:  regular rhythm. Distant but normal S1 & S2.    no murmur. no rub.  no gallop. PULM:  Chest excursion is symmetric. Breath sounds are vesicular. Adventitious sounds:  Prolonged expiratory phase w/o wheezing    AB:  Abdominal shape is obese. Bowel sounds are active. Generally soft to palpation. no tenderness is present. no involuntary guarding. no rebound guarding. EXTR:  Skin is warm. Capillary refill brisk. no specific or pathognomic rash. no clubbing. no pitting edema. no active wound or ulcer.       LABS:  Lab Results   Component Value Date    WBC 9.1 02/01/2022    HGB 8.9 (L) 02/01/2022    HCT 26.6 (L) 02/01/2022    MCV 88.4 02/01/2022     02/01/2022     Lab Results   Component Value Date    CREATININE 7.0 (HH) 02/01/2022    BUN 63 (H) 02/01/2022     02/01/2022    K 4.2 02/01/2022    CL 97 (L) 02/01/2022    CO2 23 02/01/2022     Lab Results   Component Value Date    ALT 9 (L) 02/01/2022    AST 8 (L) 02/01/2022    ALKPHOS 131 (H) 02/01/2022    BILITOT <0.2 02/01/2022     Lab Results   Component Value Date    CKTOTAL 167 06/05/2020    TROPONINI 0.21 (H) 02/01/2022     No results for input(s): PHART, [id number], PO2ART in the last 72 hours. IMAGING:  XR CHEST PORTABLE    Result Date: 2/1/2022  EXAMINATION: ONE XRAY VIEW OF THE CHEST 2/1/2022 2:41 am COMPARISON: 01/12/2022 HISTORY: ORDERING SYSTEM PROVIDED HISTORY: SOB TECHNOLOGIST PROVIDED HISTORY: Reason for exam:->SOB Reason for Exam: sob FINDINGS: EKG leads overlie the chest.  Prosthetic heart valve again identified. Left-sided dialysis catheter remains in place. Heart size is normal.  No pneumothorax. There is some mild central interstitial prominence. No pneumothorax or effusion. Mild interstitial prominence which likely represents mild vascular congestion or interstitial edema. Atypical or viral infection less likely. Short-term follow-up recommended if symptoms persist.       CT CHEST PULMONARY EMBOLISM W CONTRAST    Result Date: 2/1/2022  EXAMINATION: CTA OF THE CHEST 2/1/2022 3:55 am TECHNIQUE: CTA of the chest was performed after the administration of intravenous contrast.  Multiplanar reformatted images are provided for review. MIP images are provided for review. Dose modulation, iterative reconstruction, and/or weight based adjustment of the mA/kV was utilized to reduce the radiation dose to as low as reasonably achievable. COMPARISON: Portable chest performed earlier today at 0230 hours and CT dated 10/31/2019 HISTORY: ORDERING SYSTEM PROVIDED HISTORY: SOB, rule out PE TECHNOLOGIST PROVIDED HISTORY: Reason for exam:->SOB, rule out PE Decision Support Exception - unselect if not a suspected or confirmed emergency medical condition->Emergency Medical Condition (MA) Reason for Exam: sob x 1 day Relevant Medical/Surgical History: cardaic stent, TAVR FINDINGS: Pulmonary Arteries: Pulmonary arteries are adequately opacified for evaluation.   No evidence of intraluminal filling defect to suggest pulmonary embolism. Main pulmonary artery is normal in caliber. Mediastinum: Patient is status post aortic valve replacement. Mild cardiomegaly. No pericardial effusion. No aortic dissection. Scattered vascular calcifications. Multiple bilateral mediastinal and hilar lymph nodes. Many of these appear borderline to mildly enlarged. 1 example in the right hilum measures 2 x 1.3 cm. Upper right paratracheal lymph node measures 2.1 x 1.1 cm. Lungs/pleura: No pneumothorax or pleural effusion. Minimal vascular congestion without evidence of edema. Pleural base calcified nodular density in the posterior left lower lobe is unchanged. A noncalcified nodule in the base of the left lower lobe on series 3, image 69 is new and measures approximately 4-5 mm. No dominant lung mass identified. Upper Abdomen: Cholecystectomy clips. Low-attenuation left adrenal nodule is unchanged measuring 1.8 cm and likely represents an adenoma. Minimal pancreatic calcifications suggestive of chronic pancreatitis. Visualized portions of the upper abdomen demonstrate no acute abnormality. Soft Tissues/Bones: Degenerative changes are scattered in the spine. Lower thoracic vertebral bodies demonstrate minimal depression of superior endplates. These are unchanged. Minimal vascular congestion without edema. No identified pulmonary embolism. Interval development of a 4-5 mm noncalcified left lower lobe nodule. This could be followed annually as indicated per Fleischner society criteria. Interval development of minimally prominent lymph nodes. These are favored to be reactive but follow-up in 6 months recommended to ensure stability, sooner if indicated clinically.  RECOMMENDATIONS: Unavailable       POC US ECHOCARDIO TRANSTHORACIC LTD    Result Date: 2/1/2022  POCUS_Cardiac:     Exam Information:         Exam type:  Clinically indicated     Indication(s) for Exam:         The exam was performed with the following indications[de-identified] Chest Pain, Dyspnea, Concern for effusion     Views Obtained & Images Saved for These Views: The pericardial sac, myocardium, 4 chambers, and IVC were identified using the following views[de-identified]  Subxiphoid, Parasternal long-axis, Apical 4-chamber     Findings:         Pericardial Effusion:  No pericardial effusion         Cardiac Activity:  Cardiac activity normal         LV function:  Indeterminate         RV diameter:  Normal     Interpretation:         Indeterminate Electronically signed by Vic Park on Tuesday, February 1, 2022 at 2:59 AM    I directly reviewed all recent imaging studies as well as pertinent prior studies. Radiology reports may or may not be available at the time of my review. EKG:  New and pertinent prior tracings were directly reviewed. My interpretation is as follows:  Normal sinus rhythm. No other specific or diagnostic abnormalities of acute clinical significance per my read. Active Hospital Problems    Diagnosis Date Noted    Ischemic cardiomyopathy [I25.5]      Priority: High    Type 2 diabetes, uncontrolled, with neuropathy ([facility] [E11.40, E11.65] 03/04/2014     Priority: High    CAD S/P percutaneous coronary angioplasty [I25.10, Z98.61] 05/14/2013     Priority: High    S/P TAVR (transcatheter aortic valve replacement) [Z95.2] 10/19/2019     Priority: Medium    ESRD (end stage renal disease) on dialysis ([facility] [N18.6, Z99.2] 11/22/2017    Dyspnea [R06.00]     ZAINAB on CPAP [G47.33, Z99.89] 02/11/2016    Obesity (BMI 30-39. 9) [E66.9]        ASSESSMENT & PLAN  Dyspnea, Severe Asthma-COPD, ZAINAB on CPAP  -  Multifactorial dyspnea. Primary issue at this time seems to be airflow obstruction, both intrathoracic and extrathoracic based on exam.  His upper airway seems crowded and he may have vocal cord dysfunction.  -  Seems likely to need home O2 particularly while lying supine to sleep. Will start a home O2 evaluation.   He would likely benefit from O2 bled into his home CPAP circuit. He also needs an updated PSG and CPAP/BIPAP titration. His last complete study was in 2015. He had a home study in 2019 documenting significant hypoxemia and this occurred even while using CPAP. -  Will trial nocturnal and nap BiPAP w/ empiric settings 16 / 10. Home CPAP setting is 8 - 12 cm H2O.  -  Continue home Stiolto plus as-needed short-acting bronchodilators. -  Pulmonary input will be requested. He has not been seen in outpatient pulmonary clinic for over a year, primarily because he is hospitalized so often. More often than not he sees his pulmonologist, Dr. Isidoro Lim, on an inpatient basis. ESRD, HTN, anemia of CKD  -  Currently patient seems to be euvolemic.  -  Nephrology assistance requested for HD if patient remains hospitalized more than one day. CAD s/p PCI,  HFrEF, AS s/p TAVR  -  Chronically elevated troponin. Recent NSTEMI s/p PATRICIA to mid RCA. No e/o acute stent thrombosis. Will trend out current troponin.  -  Per last (limited) TTE 1/12/22 LVEF = 40-45%. Per last complete TTE 9/7/21 aortic valve prosthesis was functioning normally. -  Continue aspirin, plavix, statin, metoprolol XL, lisinopril.  -  Consider addition of a long acting nitrate. DM2 w/ hyperglycemia  -  Hold all oral antidiabetic agents. Start s.c. Insulin regimen based on home regimen. DVT prophylaxis: SCDs, s.c. UFH  Code Status:  Full  Disposition:  Observation anticipate d/c to home in 1-2 days.     Gisela Interiano MD MD

## 2022-02-01 NOTE — ED NOTES
Bed: 11  Expected date:   Expected time:   Means of arrival:   Comments:  43 Donny Charles RN  02/01/22 [id number]

## 2022-02-01 NOTE — PLAN OF CARE
Hospitalist Plan of Care Note      PCP: Carli Palacio MD    Date of Admission: 2/1/2022    Chief Complaint: SOB    Subjective: no new c/o. Medications:  Reviewed    Infusion Medications    dextrose      sodium chloride       Scheduled Medications    aspirin  81 mg Oral Daily    calcium acetate  1 capsule Oral TID WC    clopidogrel  75 mg Oral Daily    DULoxetine  60 mg Oral Daily    fluticasone  2 spray Each Nostril Daily    gabapentin  100 mg Oral TID    linaclotide  145 mcg Oral QAM AC    lisinopril  5 mg Oral Daily    metoprolol succinate  50 mg Oral Daily    pantoprazole  40 mg Oral QAM AC    pravastatin  40 mg Oral Nightly    QUEtiapine  50 mg Oral Nightly    tiotropium-olodaterol  2 puff Inhalation Daily    traZODone  50 mg Oral Nightly    heparin (porcine)  5,000 Units SubCUTAneous 3 times per day    insulin glargine  30 Units SubCUTAneous Nightly    insulin lispro  8 Units SubCUTAneous TID WC    insulin lispro  0-12 Units SubCUTAneous TID WC    insulin lispro  0-6 Units SubCUTAneous Nightly     PRN Meds: oxyCODONE-acetaminophen, glucose, glucagon (rDNA), dextrose, sodium chloride flush, sodium chloride, ondansetron **OR** ondansetron, acetaminophen **OR** acetaminophen, polyethylene glycol, calcium carbonate, albuterol, dextrose bolus (hypoglycemia) **OR** dextrose bolus (hypoglycemia)      Intake/Output Summary (Last 24 hours) at 2/1/2022 1035  Last data filed at 2/1/2022 1008  Gross per 24 hour   Intake 480 ml   Output --   Net 480 ml       Physical Exam Performed:    BP (!) 164/83   Pulse 84   Temp 97.8 °F (36.6 °C) (Oral)   Resp 16   Ht 5' 9\" (1.753 m)   Wt 260 lb (117.9 kg)   SpO2 100%   BMI 38.40 kg/m²     General appearance: No apparent distress, appears stated age and cooperative. HEENT: Pupils equal, round, and reactive to light. Conjunctivae/corneas clear. Neck: Supple, with full range of motion. No jugular venous distention. Trachea midline.   Respiratory: Normal respiratory effort. Clear to auscultation, bilaterally without Rales/Wheezes/Rhonchi. Cardiovascular: Regular rate and rhythm with normal S1/S2 without murmurs, rubs or gallops. Abdomen: Soft, non-tender, non-distended with normal bowel sounds. Musculoskeletal: No clubbing, cyanosis or edema bilaterally. Full range of motion without deformity. Skin: Skin color, texture, turgor normal.  No rashes or lesions. Neurologic:  Neurovascularly intact without any focal sensory/motor deficits.  Cranial nerves: II-XII intact, grossly non-focal.  Psychiatric: Alert and oriented, thought content appropriate, normal insight  Capillary Refill: Brisk,< 3 seconds   Peripheral Pulses: +2 palpable, equal bilaterally       Labs:   Recent Labs     02/01/22 0245   WBC 9.1   HGB 8.9*   HCT 26.6*        Recent Labs     02/01/22 0245      K 4.2   CL 97*   CO2 23   BUN 63*   CREATININE 7.0*   CALCIUM 8.6     Recent Labs     02/01/22 0245   AST 8*   ALT 9*   BILITOT <0.2   ALKPHOS 131*     Recent Labs     02/01/22  0245   INR 1.02     Recent Labs     02/01/22  0245 02/01/22  0809   TROPONINI 0.21* 0.21*       Urinalysis:      Lab Results   Component Value Date    NITRU Negative 09/07/2021    WBCUA 10-20 09/07/2021    BACTERIA 1+ 09/07/2021    RBCUA 3-4 09/07/2021    BLOODU TRACE-LYSED 09/07/2021    SPECGRAV 1.015 09/07/2021    GLUCOSEU 100 09/07/2021       Consults:    IP CONSULT TO PULMONOLOGY  IP CONSULT TO NEPHROLOGY      Assessment/Plan:    Active Hospital Problems    Diagnosis     Ischemic cardiomyopathy [I25.5]      Priority: High    Type 2 diabetes, uncontrolled, with neuropathy ([facility] [E11.40, E11.65]      Priority: High    CAD S/P percutaneous coronary angioplasty [I25.10, Z98.61]      Priority: High    S/P TAVR (transcatheter aortic valve replacement) [Z95.2]      Priority: Medium    Asthma-COPD overlap syndrome ([facility] [J44.9]      Priority: Medium    ESRD (end stage renal disease) on dialysis ([facility] [N18.6, Z99.2]     Dyspnea [R06.00]     ZAINAB on CPAP [G47.33, Z99.89]     Obesity (BMI 30-39. 9) [E66.9]          Dyspnea, Severe Asthma-COPD, ZAINAB on CPAP  -  Multifactorial dyspnea. Primary issue at this time seems to be airflow obstruction, both intrathoracic and extrathoracic based on exam.  His upper airway seems crowded and he may have vocal cord dysfunction.  -  Seems likely to need home O2 particularly while lying supine to sleep. Will start a home O2 evaluation. He would likely benefit from O2 bled into his home CPAP circuit. He also needs an updated PSG and CPAP/BIPAP titration. His last complete study was in 2015. He had a home study in 2019 documenting significant hypoxemia and this occurred even while using CPAP. -  Will trial nocturnal and nap BiPAP w/ empiric settings 16 / 10. Home CPAP setting is 8 - 12 cm H2O.  -  Continue home Stiolto plus as-needed short-acting bronchodilators. -  Pulmonary input will be requested. He has not been seen in outpatient pulmonary clinic for over a year, primarily because he is hospitalized so often. More often than not he sees his pulmonologist, Dr. Flory Espinoza, on an inpatient basis.     ESRD, HTN, anemia of CKD  -  Currently patient seems to be euvolemic.  -  Nephrology assistance requested for HD if patient remains hospitalized more than one day.     CAD s/p PCI,  HFrEF, AS s/p TAVR  -  Chronically elevated troponin.  Recent NSTEMI s/p PATRICIA to mid RCA. No e/o acute stent thrombosis. Will trend out current troponin.  -  Per last (limited) TTE 1/12/22 LVEF = 40-45%. Per last complete TTE 9/7/21 aortic valve prosthesis was functioning normally. -  Continue aspirin, plavix, statin, metoprolol XL, lisinopril.  -  Consider addition of a long acting nitrate.     DM2 w/ hyperglycemia  -  Hold all oral antidiabetic agents.  Start s.c.  Insulin regimen based on home regimen.     DVT prophylaxis:        SCDs, s.c. UFH  Code Status:               Full  Disposition: Observation anticipate d/c to home in 1-2 days. DVT Prophylaxis: SQ Heparin/IPC    Recent Labs     02/01/22  0245        Diet: ADULT DIET; Regular  Code Status: Full Code      PT/OT Eval Status: not yet ordered. Dispo - Placed in OBServation. Patient is likely to remain in-house at least until Wed 2 Feb pending clinical course and subspecialty recs.        Bin Smiley MD

## 2022-02-01 NOTE — ED NOTES
This RN witnessed waste of 4 mg Morphine by Desiree Arciniega, NAVJOT     [facility]  02/01/22 6007

## 2022-02-01 NOTE — PLAN OF CARE
Problem: OXYGENATION/RESPIRATORY FUNCTION  Goal: Patient will achieve/maintain normal respiratory rate/effort  Description: Respiratory rate and effort will be within normal limits for the patient  Outcome: Ongoing     Problem: FLUID AND ELECTROLYTE IMBALANCE  Goal: Fluid and electrolyte balance are achieved/maintained  Outcome: Ongoing     Problem: Serum Glucose Level - Abnormal:  Goal: Ability to maintain appropriate glucose levels will improve  Description: Ability to maintain appropriate glucose levels will improve  Outcome: Ongoing     Patient's EF (Ejection Fraction) is 40-45%    Heart Failure Medications:  Diuretics[de-identified] None    (One of the following REQUIRED for EF </= 40%/SYSTOLIC FAILURE but MAY be used in EF% >40%/DIASTOLIC FAILURE)        ACE[de-identified] Lisinopril        ARB[de-identified] None         ARNI[de-identified] None    (Beta Blockers)  NON- Evidenced Based Beta Blocker (for EF% >40%/DIASTOLIC FAILURE): None    Evidenced Based Beta Blocker::(REQUIRED for EF% <40%/SYSTOLIC FAILURE) Metoprolol SUCCinate- Toprol XL  . .................................................................................................................................................. Patient's weights and intake/output reviewed: Yes    Patient's Last Weight: Admission weight 262 lbs obtained by standing scale.      Intake/Output Summary (Last 24 hours) at 2/1/2022 1429  Last data filed at 2/1/2022 1311  Gross per 24 hour   Intake 720 ml   Output --   Net 720 ml       Comorbidities Reviewed Yes    Patient has a past medical history of Ambulatory dysfunction, Aortic stenosis, Arthritis, Asthma, Bilateral hilar adenopathy syndrome, CAD (coronary artery disease), Cardiomyopathy ([address] CHF (congestive heart failure) ([address] Chronic pain, COPD (chronic obstructive pulmonary disease) ([address] Depression, Diabetes mellitus ([address] Difficult intravenous access, Emphysema of lung ([address] ESRD (end stage renal disease) on dialysis ([facility] Fear of needles, Gastric ulcer, GERD (gastroesophageal reflux disease), Heart valve problem, Hemodialysis patient ([address] History of spinal fracture, Hx of blood clots, Hyperlipidemia, Hypertension, MI (myocardial infarction) ([address] Neuromuscular disorder ([address] Numbness and tingling in left arm, Pneumonia, PONV (postoperative nausea and vomiting), Prolonged emergence from general anesthesia, Sleep apnea, Stroke ([address] TIA (transient ischemic attack), and Unspecified diseases of blood and blood-forming organs. >>For CHF and Comorbidity documentation on Education Time and Topics, please see Education Tab    Progressive Mobility Assessment:  What is this patient's Current Level of Mobility?: Ambulatory- with Assistance  How was this patient Mobilized today?: Edge of Bed, Up to Chair, and  Up to Toilet/Shower,                  With Whom? Nurse and PCA                 Level of Difficulty/Assistance: 1x Assist     Pt resting in bed at this time on  2 L O2. Pt with complaints of shortness of breath. Pt without lower extremity edema.      Patient and/or Family's stated Goal of Care this Admission: reduce shortness of breath, increase activity tolerance, better understand heart failure and disease management, and be more comfortable prior to discharge        :

## 2022-02-01 NOTE — PROGRESS NOTES
Patient admitted to room 204 from ED. Patient oriented to room, call light, bed rails, phone, lights and bathroom. Patient instructed about the schedule of the day including: vital sign frequency, lab draws, possible tests, frequency of MD and staff rounds, including RN/MD rounding together at bedside, daily weights, and I &O's. Patient instructed about prescribed diet, how to use 8MENU, and television. Telemetry box in place, patient aware of placement and reason. Bed locked, in lowest position, side rails up 2/4, call light within reach. Will continue to monitor.

## 2022-02-01 NOTE — ED NOTES
Report called to [address] [place] [address],[address] [facility]  02/01/22 7340

## 2022-02-01 NOTE — CONSULTS
[facility] Consult Note           Reason for Consult:  End stage kidney disease  Requesting Physician:  Dr. Hillary Shin    Chief Complaint:  Chest pain, Shortness of breath  History Obtained From:  patient, electronic medical record    History of Present Ilness:    46year old male with ESKD on HD MWF admitted with chest pain, shortness of breath. We have been asked to assist in further dialysis care. Last had HD on 1/31 with 2.3 liters removed, post-weight of 120 kg. Started having shortness of breath with chest pressure yesterday evening. Some improvement with sl ntg. Denies any abdominal pain or fevers.     Past Medical History:        Diagnosis Date    Ambulatory dysfunction     walker for long distances, SOB with distance    Aortic stenosis     echo 2017    Arthritis     hands and hips    Asthma     Bilateral hilar adenopathy syndrome 6/3/2013    CAD (coronary artery disease)     Dr. Javier Davidson [facility]) 04/19/2019    EF= 43%    CHF (congestive heart failure) (HCC)     Chronic pain     COPD (chronic obstructive pulmonary disease) ([address]     pulmonology Dr. Marylin Daigle    Depression     Diabetes mellitus ([address]     borderline    Difficult intravenous access     Emphysema of lung ([address]     ESRD (end stage renal disease) on dialysis ([address]     MWF    Fear of needles     Gastric ulcer     GERD (gastroesophageal reflux disease)     Heart valve problem     bicuspic valve    Hemodialysis patient ([address]     History of spinal fracture     work incident    Hx of blood clots     Bilateral lower extremities; stents in place    Hyperlipidemia     Hypertension     MI (myocardial infarction) ([address] 2019    has had 9 MIs. 2019 was the last    Neuromuscular disorder ([address]     due to CVA    Numbness and tingling in left arm     from fistula    Pneumonia     PONV (postoperative nausea and vomiting)     Prolonged emergence from general anesthesia     States requires more medication than most people    Sleep apnea     Uses CPAP    Stroke ([facility]     7mm thalamic cva 2017 deficts left side, left side weakness    TIA (transient ischemic attack)     Unspecified diseases of blood and blood-forming organs        Past Surgical History:        Procedure Laterality Date    AORTIC VALVE REPLACEMENT N/A 10/15/2019    TRANSCATHETER AORTIC VALVE REPLACEMENT FEMORAL APPROACH performed by Martha H[facility] Right 7/2/2019    PERITONEAL DIALYSIS CATHETER REMOVAL performed by Sergio Parsons MD at [facility] COLONOSCOPY  2/29/2015    WNL    CORONARY ANGIOPLASTY WITH STENT PLACEMENT  05/26/15    CYST REMOVAL  08/14/2013    EXCISION CYSTS, NECK X2 AND ABDOMINAL benign    DIAGNOSTIC CARDIAC CATH LAB PROCEDURE      DIALYSIS FISTULA CREATION Left 10/30/2017    LEFT BRACHIAL CEPHALIC FISTULA    DIALYSIS FISTULA CREATION Left 3/27/2019    LIGATION  AV FISTULA performed by Eliezer Valle MD at [facility], COLON, DIAGNOSTIC      OTHER SURGICAL HISTORY  02/01/2017    laparoscopic cholecystectomy with intraoperative cholangiogram    OTHER SURGICAL HISTORY  2018    PORT PLACEMENT  - vas cath    OTHER SURGICAL HISTORY Bilateral 06/26/2018    laprascopic peritoneal dialysis catheter placement    OTHER SURGICAL HISTORY Right 09/2018    peritoneal dialysis port placed on right side of abdomen    OTHER SURGICAL HISTORY  05/28/2019    PTA/Stenting R External Iliac artery    AR LAP INSERTION TUNNELED INTRAPERITONEAL CATHETER N/A 9/21/2018    LAPAROSCOPIC PERITONEAL DIALYSIS CATHETER REPLACEMENT performed by Sergio Parsons MD at [address] [place] [address]  01/06/2016    UPPER GASTROINTESTINAL ENDOSCOPY  01/29/2017    possible candida, otherwise normal appearing    VASCULAR SURGERY  aprx 2 years ago    2 stents placed, each side of groin Home Medications:    No current facility-administered medications on file prior to encounter. Current Outpatient Medications on File Prior to Encounter   Medication Sig Dispense Refill    QUEtiapine (SEROQUEL) 50 MG tablet TAKE 1 TABLET BY MOUTH IN THE EVENING 30 tablet 2    cyclobenzaprine (FLEXERIL) 10 MG tablet TAKE 1 TABLET BY MOUTH EVERY 8 HOURS AS NEEDED 90 tablet 2    fluticasone (FLONASE) 50 MCG/ACT nasal spray SHAKE LIQUID AND USE 2 SPRAYS IN EACH NOSTRIL DAILY 48 g 0    aspirin 81 MG chewable tablet Take 1 tablet by mouth daily 30 tablet 3    insulin glargine (BASAGLAR KWIKPEN) 100 UNIT/ML injection pen Inject 30 Units into the skin nightly 15 mL 1    lisinopril (PRINIVIL;ZESTRIL) 5 MG tablet Take 1 tablet by mouth daily 30 tablet 3    metoprolol succinate (TOPROL XL) 50 MG extended release tablet Take 1 tablet by mouth daily 30 tablet 3    oxyCODONE-acetaminophen (PERCOCET) 7.5-325 MG per tablet Take 1 tablet by mouth every 6 hours as needed (pain) for up to 30 days.  120 tablet 0    pantoprazole (PROTONIX) 40 MG tablet TAKE 1 TABLET BY MOUTH EVERY MORNING BEFORE BREAKFAST 30 tablet 1    gabapentin (NEURONTIN) 100 MG capsule TAKE 1-2 CAPSULES BY MOUTH THREE TIMES A  capsule 5    clopidogrel (PLAVIX) 75 MG tablet TAKE 1 TABLET BY MOUTH ONCE DAILY 90 tablet 1     MG capsule TAKE 1 CAPSULE BY MOUTH TWICE DAILY 60 capsule 5    thiamine (B-1) 100 MG/ML injection Infuse 1 mL intravenously daily 1 each 0    Multiple Vitamins-Minerals (THERAPEUTIC MULTIVITAMIN-MINERALS) tablet Take 1 tablet by mouth daily  0    Continuous Blood Gluc Sensor (DEXCOM G6 SENSOR) MISC Every 10 days 9 each 3    Continuous Blood Gluc Transmit (DEXCOM G6 TRANSMITTER) MISC 1 each by Does not apply route every 3 months 1 each 3    Continuous Blood Gluc  (DEXCOM G6 ) ADAM 1 each by Does not apply route Daily with lunch 1 each 0    DULoxetine (CYMBALTA) 60 MG extended release capsule TAKE 1 CAPSULE BY MOUTH EVERY DAY 30 capsule 5    traZODone (DESYREL) 150 MG tablet TAKE ONE (1) TABLET BY MOUTH NIGHTLY 30 tablet 10    pravastatin (PRAVACHOL) 40 MG tablet TAKE 1 TABLET BY MOUTH EACH EVENING 30 tablet 10    B Complex-C-Folic Acid (VIRT-CAPS) 1 MG CAPS TAKE 1 CAPSULE BY MOUTH EVERY DAY 90 capsule 1    Calcium Acetate, Phos Binder, 667 MG CAPS TAKE 1 CAPSULE BY MOUTH THREE TIMES DAILY WITH MEALS 90 capsule 3    LINZESS 145 MCG capsule TAKE 1 CAPSULE BY MOUTH EVERY MORNING BEFORE BREAKFAST 30 capsule 10    blood glucose test strips (GLUCOSE METER TEST) strip 1 each by In Vitro route 5 times daily As needed. 100 each 3    nitroGLYCERIN (NITROSTAT) 0.4 MG SL tablet DISSOLVE 1 TABLET UNDER THE TONGUE AS NEEDED FOR CHEST PAIN EVERY 5 MINUTES UP TO 3 TIMES. IF NO RELIEF CALL 911. 25 tablet 10    insulin aspart (NOVOLOG FLEXPEN) 100 UNIT/ML injection pen Inject 20 Units into the skin 3 times daily (before meals) 15 pen 5    blood glucose test strips (FREESTYLE LITE) strip Daily As needed. 100 strip 3    vitamin D (ERGOCALCIFEROL) 46432 units CAPS capsule TK 1 C PO WEEKLY  11    Tiotropium Bromide-Olodaterol (STIOLTO RESPIMAT) 2.5-2.5 MCG/ACT AERS Inhale 2 puffs into the lungs daily 2 Inhaler 0    Polyethylene Glycol 3350 GRAN       Glucose Blood (BLOOD GLUCOSE TEST STRIPS) STRP TEST 3-4 TIMES DAILY, AS DIRECTED (Patient not taking: Reported on 10/5/2021) 100 strip 3    Blood Glucose Monitoring Suppl ADAM USE AS DIRECTED. 1 Device 0    Alcohol Swabs PADS USE AS DIRECTED 300 each 3    albuterol sulfate  (90 Base) MCG/ACT inhaler Inhale 2 puffs into the lungs every 6 hours as needed for Wheezing 1 Inhaler 3    ipratropium-albuterol (DUONEB) 0.5-2.5 (3) MG/3ML SOLN nebulizer solution Inhale 3 mLs into the lungs every 6 hours as needed for Shortness of Breath 360 mL 1    calcium carbonate (TUMS) 500 MG chewable tablet Take 1 tablet by mouth 3 times daily as needed for Heartburn. Allergies:  Morphine    Social History:    Social History     Socioeconomic History    Marital status:      Spouse name: Not on file    Number of children: Not on file    Years of education: Not on file    Highest education level: Not on file   Occupational History    Not on file   Tobacco Use    Smoking status: Former Smoker     Packs/day: 0.50     Years: 33.00     Pack years: 16.50     Types: Cigarettes     Quit date: 2020     Years since quittin.7    Smokeless tobacco: Never Used    Tobacco comment: States quit 2021   Vaping Use    Vaping Use: Never used   Substance and Sexual Activity    Alcohol use: Not Currently     Alcohol/week: 0.0 standard drinks     Comment: occ    Drug use: No    Sexual activity: Yes     Partners: Female     Comment:    Other Topics Concern    Not on file   Social History Narrative    Not on file     Social Determinants of Health     Financial Resource Strain:     Difficulty of Paying Living Expenses: Not on file   Food Insecurity:     Worried About [address] in the Last Year: Not on file    [address] in the Last Year: Not on file   Transportation Needs:     Lack of Transportation (Medical): Not on file    Lack of Transportation (Non-Medical):  Not on file   Physical Activity:     Days of Exercise per Week: Not on file    Minutes of Exercise per Session: Not on file   Stress:     Feeling of Stress : Not on file   Social Connections:     Frequency of Communication with Friends and Family: Not on file    Frequency of Social Gatherings with Friends and Family: Not on file    Attends [employer] Services: Not on file    Active Member of Clubs or Organizations: Not on file    Attends Club or Organization Meetings: Not on file    Marital Status: Not on file   Intimate Partner Violence:     Fear of Current or Ex-Partner: Not on file    Emotionally Abused: Not on file    Physically Abused: Not on file   Aldo Sexually Abused: Not on file   Housing Stability:     Unable to Pay for Housing in the Last Year: Not on file    Number of Places Lived in the Last Year: Not on file    Unstable Housing in the Last Year: Not on file       Family History:   Family History   Problem Relation Age of Onset    Diabetes Mother     Heart Disease Father     Kidney Disease Sister         stage 4-kidney failure    Cancer Sister     Heart Disease Sister     Obesity Sister     Cancer Sister     Heart Disease Sister     Obesity Sister     Alcohol Abuse Brother        Review of Systems:   Pertinent positives stated above in HPI. Remainder of 10 point review of systems were reviewed and were negative.     Physical exam:   Constitutional:  VITALS:  BP (!) 143/83   Pulse 75   Temp 98.4 °F (36.9 °C) (Oral)   Resp 16   Ht 5' 9\" (1.753 m)   Wt 262 lb 6.4 oz (119 kg)   SpO2 100%   BMI 38.75 kg/m²   Gen: alert, awake, ill-appearing  Skin: no rash, turgor wnl  Heent:  eomi, mmm  Neck: no bruits or jvd noted, thyroid normal  Cardiovascular:  S1, S2 without m/r/g  Respiratory: CTA B without w/r/r; respiratory effort normal  Abdomen:  +bs, soft, nt, nd, no hepatosplenomegaly  Ext: + lower extremity edema  Access: Left IJ TDC  Psychiatric: mood and affect appropriate; judgement and insight intact  Musculoskeletal:  Rom, muscular strength limited; digits, nails normal    Data/  CBC:   Lab Results   Component Value Date    WBC 9.1 02/01/2022    RBC 3.01 02/01/2022    HGB 8.9 02/01/2022    HCT 26.6 02/01/2022    MCV 88.4 02/01/2022    MCH 29.6 02/01/2022    MCHC 33.5 02/01/2022    RDW 16.4 02/01/2022     02/01/2022    MPV 8.0 02/01/2022     BMP:    Lab Results   Component Value Date     02/01/2022    K 4.2 02/01/2022    CL 97 02/01/2022    CO2 23 02/01/2022    BUN 63 02/01/2022    LABALBU 3.7 02/01/2022    CREATININE 7.0 02/01/2022    CALCIUM 8.6 02/01/2022    GFRAA 10 02/01/2022    GFRAA >60 05/17/2013    LABGLOM 8 02/01/2022    GLUCOSE 399 02/01/2022         Assessment/    - End stage kidney disease - on dialysis MWF    - Troponin elevation with hx of CAD s/p PCI    - s/p TAVR    - Hypertension    - ZAINAB - on CPAP    - DM2 - uncontrolled    - Anemia of chronic disease - Hb ~9 range      Plan/    - HD tomorrow per schedule, listed EDW of 116 kg though has been leaving closer to ~119 kg  - DAVON dosing with HD - increase from 1K to 2K   - Trend labs, bp's      Thank you for the consultation. Please do not hesitate to call with questions. ____________________________________  Mir Bar MD  [facility]  [email or web address]  Office: [phone]

## 2022-02-01 NOTE — ED NOTES
Patient reports being a MWF dialysis patient. States he did receive a full dialysis treatment yesterday (Monday).      Divina Flores RN  02/01/22 1974

## 2022-02-01 NOTE — ED PROVIDER NOTES
CHIEF COMPLAINT  Shortness of Breath (started at 8p, +CP, hx mechanical valve, recent stent placement)      HISTORY OF PRESENT ILLNESS  Juan Francisco Nesbitt is a 46 y.o. male with a history of CAD status post RCA stent several weeks ago, end-stage renal disease on dialysis Monday Wednesday Friday, type 2 diabetes, COPD, struct of sleep apnea, CVA who presents emergency department for evaluation of shortness of breath. Patient arrives per EMS. According to patient, he has shortness of breath, chest pain started around 8 PM last night. He states that it feels like he cannot catch his breath. He states that he has been watching his diet closely and does not think that he has extra fluid on his body. He last underwent dialysis yesterday. He denies any increased swelling in his legs. No fever. He has been compliant with his medications including Plavix since discharge from the hospital.  He. Had stent to the RCA on January 12.     Past Medical History:   Diagnosis Date    Ambulatory dysfunction     walker for long distances, SOB with distance    Aortic stenosis     echo 2017    Arthritis     hands and hips    Asthma     Bilateral hilar adenopathy syndrome 6/3/2013    CAD (coronary artery disease)     Dr. Candelaria Orozco [facility]) 04/19/2019    EF= 43%    CHF (congestive heart failure) (HCC)     Chronic pain     COPD (chronic obstructive pulmonary disease) ([address]     pulmonology Dr. Cynthia Vallejo    Depression     Diabetes mellitus ([address]     borderline    Difficult intravenous access     Emphysema of lung ([address]     ESRD (end stage renal disease) on dialysis ([address]     MWF    Fear of needles     Gastric ulcer     GERD (gastroesophageal reflux disease)     Heart valve problem     bicuspic valve    Hemodialysis patient ([address]     History of spinal fracture     work incident    Hx of blood clots     Bilateral lower extremities; stents in place    Hyperlipidemia     Hypertension     MI (myocardial infarction) ([address] 2019    has had 9 MIs. 2019 was the last    Neuromuscular disorder ([address]     due to CVA    Numbness and tingling in left arm     from fistula    Pneumonia     PONV (postoperative nausea and vomiting)     Prolonged emergence from general anesthesia     States requires more medication than most people    Sleep apnea     Uses CPAP    Stroke ([address]     7mm thalamic cva 2017 deficts left side, left side weakness    TIA (transient ischemic attack)     Unspecified diseases of blood and blood-forming organs      Past Surgical History:   Procedure Laterality Date    AORTIC VALVE REPLACEMENT N/A 10/15/2019    TRANSCATHETER AORTIC VALVE REPLACEMENT FEMORAL APPROACH performed by Darline Jaramillo MD at [address] [place] [address] Ave Right 7/2/2019    PERITONEAL DIALYSIS CATHETER REMOVAL performed by Rodolfo Ye MD at [facility] COLONOSCOPY  2/29/2015    WN    CORONARY ANGIOPLASTY WITH STENT PLACEMENT  05/26/15    CYST REMOVAL  08/14/2013    EXCISION CYSTS, NECK X2 AND ABDOMINAL benign    DIAGNOSTIC CARDIAC CATH LAB PROCEDURE      DIALYSIS FISTULA CREATION Left 10/30/2017    LEFT BRACHIAL CEPHALIC FISTULA    DIALYSIS FISTULA CREATION Left 3/27/2019    LIGATION  AV FISTULA performed by Radha Carrasco MD at [facility]. [address], COLON, DIAGNOSTIC      OTHER SURGICAL HISTORY  02/01/2017    laparoscopic cholecystectomy with intraoperative cholangiogram    OTHER SURGICAL HISTORY  2018    PORT PLACEMENT  - vas cath    OTHER SURGICAL HISTORY Bilateral 06/26/2018    laprascopic peritoneal dialysis catheter placement    OTHER SURGICAL HISTORY Right 09/2018    peritoneal dialysis port placed on right side of abdomen    OTHER SURGICAL HISTORY  05/28/2019    PTA/Stenting R External Iliac artery    [place] LAP INSERTION TUNNELED INTRAPERITONEAL CATHETER N/A 9/21/2018    LAPAROSCOPIC PERITONEAL DIALYSIS CATHETER REPLACEMENT performed by Rodolfo Ye MD at [facility] ENDOSCOPY  2016    UPPER GASTROINTESTINAL ENDOSCOPY  2017    possible candida, otherwise normal appearing    VASCULAR SURGERY  aprx 2 years ago    2 stents placed, each side of groin     Family History   Problem Relation Age of Onset    Diabetes Mother     Heart Disease Father     Kidney Disease Sister         stage 4-kidney failure    Cancer Sister     Heart Disease Sister     Obesity Sister     Cancer Sister     Heart Disease Sister     Obesity Sister     Alcohol Abuse Brother      Social History     Socioeconomic History    Marital status:      Spouse name: Not on file    Number of children: Not on file    Years of education: Not on file    Highest education level: Not on file   Occupational History    Not on file   Tobacco Use    Smoking status: Former Smoker     Packs/day: 0.50     Years: 33.00     Pack years: 16.50     Types: Cigarettes     Quit date: 2020     Years since quittin.7    Smokeless tobacco: Never Used    Tobacco comment: [facility] quit 2021   Vaping Use    Vaping Use: Never used   Substance and Sexual Activity    Alcohol use: Not Currently     Alcohol/week: 0.0 standard drinks     Comment: occ    Drug use: No    Sexual activity: Yes     Partners: Female     Comment:    Other Topics Concern    Not on file   Social History Narrative    Not on file     Social Determinants of Health     Financial Resource Strain:     Difficulty of Paying Living Expenses: Not on file   Food Insecurity:     Worried About [address] in the Last Year: Not on file    [address] in the Last Year: Not on file   Transportation Needs:     Lack of Transportation (Medical): Not on file    Lack of Transportation (Non-Medical):  Not on file   Physical Activity:     Days of Exercise per Week: Not on file    Minutes of Exercise per Session: Not on file   Stress:     Feeling of Stress : Not on file   Social Connections:     Frequency of Communication with Friends and Family: Not on file    Frequency of Social Gatherings with Friends and Family: Not on file    Attends Protestant Services: Not on file    Active Member of Clubs or Organizations: Not on file    Attends Club or Organization Meetings: Not on file    Marital Status: Not on file   Intimate Partner Violence:     Fear of Current or Ex-Partner: Not on file    Emotionally Abused: Not on file    Physically Abused: Not on file    Sexually Abused: Not on file   Housing Stability:     Unable to Pay for Housing in the Last Year: Not on file    Number of [place] in the Last Year: Not on file    Unstable Housing in the Last Year: Not on file     No current facility-administered medications for this encounter. Current Outpatient Medications   Medication Sig Dispense Refill    QUEtiapine (SEROQUEL) 50 MG tablet TAKE 1 TABLET BY MOUTH IN THE EVENING 30 tablet 2    cyclobenzaprine (FLEXERIL) 10 MG tablet TAKE 1 TABLET BY MOUTH EVERY 8 HOURS AS NEEDED 90 tablet 2    fluticasone (FLONASE) 50 MCG/ACT nasal spray SHAKE LIQUID AND USE 2 SPRAYS IN EACH NOSTRIL DAILY 48 g 0    aspirin 81 MG chewable tablet Take 1 tablet by mouth daily 30 tablet 3    insulin glargine (BASAGLAR KWIKPEN) 100 UNIT/ML injection pen Inject 30 Units into the skin nightly 15 mL 1    lisinopril (PRINIVIL;ZESTRIL) 5 MG tablet Take 1 tablet by mouth daily 30 tablet 3    metoprolol succinate (TOPROL XL) 50 MG extended release tablet Take 1 tablet by mouth daily 30 tablet 3    oxyCODONE-acetaminophen (PERCOCET) 7.5-325 MG per tablet Take 1 tablet by mouth every 6 hours as needed (pain) for up to 30 days.  120 tablet 0    pantoprazole (PROTONIX) 40 MG tablet TAKE 1 TABLET BY MOUTH EVERY MORNING BEFORE BREAKFAST 30 tablet 1    gabapentin (NEURONTIN) 100 MG capsule TAKE 1-2 CAPSULES BY MOUTH THREE TIMES A  capsule 5    clopidogrel (PLAVIX) 75 MG tablet TAKE 1 TABLET BY MOUTH ONCE DAILY 90 tablet 1     MG capsule TAKE 1 CAPSULE BY MOUTH TWICE DAILY 60 capsule 5    thiamine (B-1) 100 MG/ML injection Infuse 1 mL intravenously daily 1 each 0    Multiple Vitamins-Minerals (THERAPEUTIC MULTIVITAMIN-MINERALS) tablet Take 1 tablet by mouth daily  0    Continuous Blood Gluc Sensor (DEXCOM G6 SENSOR) MISC Every 10 days 9 each 3    Continuous Blood Gluc Transmit (DEXCOM G6 TRANSMITTER) MISC 1 each by Does not apply route every 3 months 1 each 3    Continuous Blood Gluc  (DEXCOM G6 ) ADAM 1 each by Does not apply route Daily with lunch 1 each 0    DULoxetine (CYMBALTA) 60 MG extended release capsule TAKE 1 CAPSULE BY MOUTH EVERY DAY 30 capsule 5    traZODone (DESYREL) 150 MG tablet TAKE ONE (1) TABLET BY MOUTH NIGHTLY 30 tablet 10    pravastatin (PRAVACHOL) 40 MG tablet TAKE 1 TABLET BY MOUTH EACH EVENING 30 tablet 10    B Complex-C-Folic Acid (VIRT-CAPS) 1 MG CAPS TAKE 1 CAPSULE BY MOUTH EVERY DAY 90 capsule 1    Calcium Acetate, Phos Binder, 667 MG CAPS TAKE 1 CAPSULE BY MOUTH THREE TIMES DAILY WITH MEALS 90 capsule 3    LINZESS 145 MCG capsule TAKE 1 CAPSULE BY MOUTH EVERY MORNING BEFORE BREAKFAST 30 capsule 10    blood glucose test strips (GLUCOSE METER TEST) strip 1 each by In Vitro route 5 times daily As needed. 100 each 3    nitroGLYCERIN (NITROSTAT) 0.4 MG SL tablet DISSOLVE 1 TABLET UNDER THE TONGUE AS NEEDED FOR CHEST PAIN EVERY 5 MINUTES UP TO 3 TIMES. IF NO RELIEF CALL 911. 25 tablet 10    insulin aspart (NOVOLOG FLEXPEN) 100 UNIT/ML injection pen Inject 20 Units into the skin 3 times daily (before meals) 15 pen 5    blood glucose test strips (FREESTYLE LITE) strip Daily As needed.  100 strip 3    vitamin D (ERGOCALCIFEROL) 21863 units CAPS capsule TK 1 C PO WEEKLY  11    Tiotropium Bromide-Olodaterol (STIOLTO RESPIMAT) 2.5-2.5 MCG/ACT AERS Inhale 2 puffs into the lungs daily 2 Inhaler 0    Polyethylene Glycol 3350 GRAN       Glucose Blood (BLOOD GLUCOSE TEST STRIPS) STRP TEST 3-4 TIMES DAILY, AS DIRECTED (Patient not taking: Reported on 10/5/2021) 100 strip 3    Blood Glucose Monitoring Suppl ADAM USE AS DIRECTED. 1 Device 0    Alcohol Swabs PADS USE AS DIRECTED 300 each 3    albuterol sulfate  (90 Base) MCG/ACT inhaler Inhale 2 puffs into the lungs every 6 hours as needed for Wheezing 1 Inhaler 3    ipratropium-albuterol (DUONEB) 0.5-2.5 (3) MG/3ML SOLN nebulizer solution Inhale 3 mLs into the lungs every 6 hours as needed for Shortness of Breath 360 mL 1    calcium carbonate (TUMS) 500 MG chewable tablet Take 1 tablet by mouth 3 times daily as needed for Heartburn. Allergies   Allergen Reactions    Morphine Nausea And Vomiting       REVIEW OF SYSTEMS  Positive and pertinent negatives as per HPI. All other systems were reviewed and are negative. PHYSICAL EXAM  BP (!) 149/67   Pulse 85   Temp 99.9 °F (37.7 °C) (Oral)   Resp 13   Ht 5' 9\" (1.753 m)   Wt 260 lb (117.9 kg)   SpO2 98%   BMI 38.40 kg/m²   GENERAL APPEARANCE: Awake and alert. Cooperative. HEAD: Normocephalic. Atraumatic. ENT: Mucous membranes are moist.   NECK: Supple, trachea midline. No significant lymphadenopathy  HEART: RRR. No harsh murmurs. Intact radial pulses 2+ bilaterally. LUNGS: Respirations unlabored without accessory muscle use. Speaking comfortably in full sentences. ABDOMEN: Soft. Non-distended. Non-tender. No guarding or rebound. EXTREMITIES: No peripheral edema. No acute deformities. SKIN: Warm and dry. No acute rashes. NEUROLOGICAL: Alert and oriented X 3. No focal deficits    LABS  I have reviewed all labs for this visit.    Results for orders placed or performed during the hospital encounter of 02/01/22   CBC Auto Differential   Result Value Ref Range    WBC 9.1 4.0 - 11.0 K/uL    RBC 3.01 (L) 4.20 - 5.90 M/uL    Hemoglobin 8.9 (L) 13.5 - 17.5 g/dL    Hematocrit 26.6 (L) 40.5 - 52.5 %    MCV 88.4 80.0 - 100.0 fL Ventricular Rate 94 BPM    Atrial Rate 94 BPM    P-R Interval 170 ms    QRS Duration 98 ms    Q-T Interval 388 ms    QTc Calculation (Bazett) 485 ms    P Axis 27 degrees    R Axis -16 degrees    T Axis 93 degrees    Diagnosis       Normal sinus rhythmAbnormal QRS-T angle, consider primary T wave abnormalityProlonged QTAbnormal ECGWhen compared with ECG of 15-JOE-2022 05:35,Borderline criteria for Inferior infarct are no longer PresentT wave inversion no longer evident in Lateral leads       EKG  The Ekg interpreted by myself in the emergency department in the absence of a cardiologist.  normal sinus rhythm with a rate of 94  Axis is   Normal  QTc is  485  Intervals and Durations are unremarkable. No specific ST-T wave changes appreciated. There is minimal ST changes, T wave inversions lead I, aVL, unchanged when compared to prior EKG dated January 12 22  No evidence of acute ischemia. RADIOLOGY  X-RAYS:  I have reviewed radiologic plain film image(s). ALL OTHER NON-PLAIN FILM IMAGES SUCH AS CT, ULTRASOUND AND MRI HAVE BEEN READ BY THE RADIOLOGIST. CT CHEST PULMONARY EMBOLISM W CONTRAST   Final Result   Minimal vascular congestion without edema. No identified pulmonary embolism. Interval development of a 4-5 mm noncalcified left lower lobe nodule. This   could be followed annually as indicated per Fleischner society criteria. Interval development of minimally prominent lymph nodes. These are favored   to be reactive but follow-up in 6 months recommended to ensure stability,   sooner if indicated clinically. RECOMMENDATIONS:   Unavailable         XR CHEST PORTABLE   Final Result   Mild interstitial prominence which likely represents mild vascular congestion   or interstitial edema. Atypical or viral infection less likely.   Short-term   follow-up recommended if symptoms persist.         POC US [address] [place] [address]   Final Result              ED COURSE/MDM  Patient seen and evaluated. Old records reviewed. Labs and imaging reviewed and results discussed with patient. 49-year-old male presenting for evaluation of shortness of breath, dyspnea, chest pain in the setting of recent cardiac stenting. Patient arrives hypertensive. He is afebrile. Saturating 1% on room air. He does appear somewhat dyspneic with mild accessory muscle use. He does not have findings with significant lower extremity swelling. Bedside ultrasound reveals no significant pericardial effusion, no obvious right heart strain. ED evaluation clued basic labs, cardiac panel, CT chest to rule out pulmonary embolism. EKG is nonischemic, does not appear consistent with in-stent thrombosis. Patient will be started on BiPAP to help improve his work of breathing while diagnostic work-up is pending. Troponin is mildly elevated to 0.21. There is no ischemic change on EKG. BNP is also elevated to 31,000, this is roughly baseline. Creatinine is elevated to 7 however in the setting of end-stage renal disease, this is expected. Potassium within normal limits. Hemoglobin is stable 8.9. CT of the chest reveals no pulmonary embolism. No additional findings to suggest pneumonia or other acute intra thoracic process to explain the patient's dyspnea. Use the patient's continued dyspnea, chest pain, recent stent placement, patient will require admission for continued management of his symptoms, cardiology consultation and continue management. Patient signed out to hospitalist in stable condition. CLINICAL IMPRESSION  1. Shortness of breath    2. Elevated troponin    3. Congestive heart failure, unspecified HF chronicity, unspecified heart failure type ([address]    4. ESRD (end stage renal disease) ([facility])        Blood pressure (!) 149/67, pulse 85, temperature 99.9 °F (37.7 °C), temperature source Oral, resp. rate 13, height 5' 9\" (1.753 m), weight 260 lb (117.9 kg), SpO2 98 %.     DISPOSITION  [facility]. This chart was generated in part by using Dragon Dictation system and may contain errors related to that system including errors in grammar, punctuation, and spelling, as well as words and phrases that may be inappropriate. If there are any questions or concerns please feel free to contact the dictating provider for clarification.      Shayan Villarreal MD  02/01/22 2615

## 2022-02-01 NOTE — ED NOTES
Patient speaking with this RN during assessment, patient was becoming tachypneic. This RN instructed patient to take slow deep breaths in through his nose and out through the his mouth. Patient stated, \"This makes me feel like I can't breathe\". Patient's oxygen saturations maintained at % during conversation and while patient practicing slow breath down.   This RN left room with patient's respiration rate at approx 18bmp     Gonzalo Nickerson RN  02/01/22 [id number]

## 2022-02-01 NOTE — ED NOTES
Patient transported to floor via stretcher. All personal belongings sent with patient to the floor.  NO tele box was requested by clinical.      Anahi Quiles RN  02/01/22 [id number]

## 2022-02-02 LAB
ANION GAP SERPL CALCULATED.3IONS-SCNC: 14 MMOL/L (ref 3–16)
BASOPHILS ABSOLUTE: 0.1 K/UL (ref 0–0.2)
BASOPHILS RELATIVE PERCENT: 0.9 %
BUN BLDV-MCNC: 71 MG/DL (ref 7–20)
CALCIUM SERPL-MCNC: 8.6 MG/DL (ref 8.3–10.6)
CHLORIDE BLD-SCNC: 95 MMOL/L (ref 99–110)
CO2: 24 MMOL/L (ref 21–32)
CREAT SERPL-MCNC: 8.1 MG/DL (ref 0.9–1.3)
EOSINOPHILS ABSOLUTE: 0.6 K/UL (ref 0–0.6)
EOSINOPHILS RELATIVE PERCENT: 6.8 %
GFR AFRICAN AMERICAN: 8
GFR NON-AFRICAN AMERICAN: 7
GLUCOSE BLD-MCNC: 130 MG/DL (ref 70–99)
GLUCOSE BLD-MCNC: 207 MG/DL (ref 70–99)
GLUCOSE BLD-MCNC: 284 MG/DL (ref 70–99)
GLUCOSE BLD-MCNC: 68 MG/DL (ref 70–99)
GLUCOSE BLD-MCNC: 73 MG/DL (ref 70–99)
HCT VFR BLD CALC: 27.9 % (ref 40.5–52.5)
HEMOGLOBIN: 9.1 G/DL (ref 13.5–17.5)
IRON SATURATION: 26 % (ref 20–50)
IRON: 57 UG/DL (ref 59–158)
LYMPHOCYTES ABSOLUTE: 1.3 K/UL (ref 1–5.1)
LYMPHOCYTES RELATIVE PERCENT: 16.1 %
MAGNESIUM: 1.9 MG/DL (ref 1.8–2.4)
MCH RBC QN AUTO: 29.1 PG (ref 26–34)
MCHC RBC AUTO-ENTMCNC: 32.5 G/DL (ref 31–36)
MCV RBC AUTO: 89.3 FL (ref 80–100)
MONOCYTES ABSOLUTE: 0.5 K/UL (ref 0–1.3)
MONOCYTES RELATIVE PERCENT: 5.6 %
NEUTROPHILS ABSOLUTE: 5.8 K/UL (ref 1.7–7.7)
NEUTROPHILS RELATIVE PERCENT: 70.6 %
PDW BLD-RTO: 15.9 % (ref 12.4–15.4)
PERFORMED ON: ABNORMAL
PERFORMED ON: NORMAL
PLATELET # BLD: 261 K/UL (ref 135–450)
PMV BLD AUTO: 8.1 FL (ref 5–10.5)
POTASSIUM SERPL-SCNC: 4.9 MMOL/L (ref 3.5–5.1)
RBC # BLD: 3.12 M/UL (ref 4.2–5.9)
SODIUM BLD-SCNC: 133 MMOL/L (ref 136–145)
TOTAL IRON BINDING CAPACITY: 221 UG/DL (ref 260–445)
WBC # BLD: 8.2 K/UL (ref 4–11)

## 2022-02-02 PROCEDURE — 2500000003 HC RX 250 WO HCPCS: Performed by: INTERNAL MEDICINE

## 2022-02-02 PROCEDURE — 6370000000 HC RX 637 (ALT 250 FOR IP): Performed by: INTERNAL MEDICINE

## 2022-02-02 PROCEDURE — 6360000002 HC RX W HCPCS: Performed by: INTERNAL MEDICINE

## 2022-02-02 PROCEDURE — 83550 IRON BINDING TEST: CPT

## 2022-02-02 PROCEDURE — G0378 HOSPITAL OBSERVATION PER HR: HCPCS

## 2022-02-02 PROCEDURE — 94640 AIRWAY INHALATION TREATMENT: CPT

## 2022-02-02 PROCEDURE — 80048 BASIC METABOLIC PNL TOTAL CA: CPT

## 2022-02-02 PROCEDURE — 83735 ASSAY OF MAGNESIUM: CPT

## 2022-02-02 PROCEDURE — 99232 SBSQ HOSP IP/OBS MODERATE 35: CPT | Performed by: INTERNAL MEDICINE

## 2022-02-02 PROCEDURE — 6360000002 HC RX W HCPCS

## 2022-02-02 PROCEDURE — 90935 HEMODIALYSIS ONE EVALUATION: CPT

## 2022-02-02 PROCEDURE — 94660 CPAP INITIATION&MGMT: CPT

## 2022-02-02 PROCEDURE — 2700000000 HC OXYGEN THERAPY PER DAY

## 2022-02-02 PROCEDURE — 5A1D70Z PERFORMANCE OF URINARY FILTRATION, INTERMITTENT, LESS THAN 6 HOURS PER DAY: ICD-10-PCS | Performed by: INTERNAL MEDICINE

## 2022-02-02 PROCEDURE — 36415 COLL VENOUS BLD VENIPUNCTURE: CPT

## 2022-02-02 PROCEDURE — 85025 COMPLETE CBC W/AUTO DIFF WBC: CPT

## 2022-02-02 PROCEDURE — 94761 N-INVAS EAR/PLS OXIMETRY MLT: CPT

## 2022-02-02 PROCEDURE — 83540 ASSAY OF IRON: CPT

## 2022-02-02 RX ORDER — HEPARIN SODIUM 1000 [USP'U]/ML
INJECTION, SOLUTION INTRAVENOUS; SUBCUTANEOUS
Status: DISPENSED
Start: 2022-02-02 | End: 2022-02-03

## 2022-02-02 RX ORDER — HEPARIN SODIUM 1000 [USP'U]/ML
4700 INJECTION, SOLUTION INTRAVENOUS; SUBCUTANEOUS PRN
Status: DISCONTINUED | OUTPATIENT
Start: 2022-02-02 | End: 2022-02-04 | Stop reason: HOSPADM

## 2022-02-02 RX ADMIN — HEPARIN SODIUM 4700 UNITS: 1000 INJECTION INTRAVENOUS; SUBCUTANEOUS at 12:28

## 2022-02-02 RX ADMIN — QUETIAPINE FUMARATE 50 MG: 25 TABLET ORAL at 22:28

## 2022-02-02 RX ADMIN — PRAVASTATIN SODIUM 40 MG: 40 TABLET ORAL at 22:28

## 2022-02-02 RX ADMIN — OXYCODONE AND ACETAMINOPHEN 1 TABLET: 7.5; 325 TABLET ORAL at 15:12

## 2022-02-02 RX ADMIN — GABAPENTIN 100 MG: 100 CAPSULE ORAL at 22:28

## 2022-02-02 RX ADMIN — CALCIUM ACETATE 667 MG: 667 CAPSULE ORAL at 15:11

## 2022-02-02 RX ADMIN — PANTOPRAZOLE SODIUM 40 MG: 40 TABLET, DELAYED RELEASE ORAL at 05:38

## 2022-02-02 RX ADMIN — OXYCODONE AND ACETAMINOPHEN 1 TABLET: 7.5; 325 TABLET ORAL at 22:28

## 2022-02-02 RX ADMIN — INSULIN LISPRO 3 UNITS: 100 INJECTION, SOLUTION INTRAVENOUS; SUBCUTANEOUS at 22:27

## 2022-02-02 RX ADMIN — EPOETIN ALFA-EPBX 2000 UNITS: 2000 INJECTION, SOLUTION INTRAVENOUS; SUBCUTANEOUS at 12:29

## 2022-02-02 RX ADMIN — TIOTROPIUM BROMIDE AND OLODATEROL 2 PUFF: 3.124; 2.736 SPRAY, METERED RESPIRATORY (INHALATION) at 07:58

## 2022-02-02 RX ADMIN — TRAZODONE HYDROCHLORIDE 50 MG: 50 TABLET ORAL at 22:28

## 2022-02-02 RX ADMIN — INSULIN GLARGINE 30 UNITS: 100 INJECTION, SOLUTION SUBCUTANEOUS at 22:27

## 2022-02-02 ASSESSMENT — PAIN SCALES - GENERAL
PAINLEVEL_OUTOF10: 0
PAINLEVEL_OUTOF10: 9
PAINLEVEL_OUTOF10: 7
PAINLEVEL_OUTOF10: 0
PAINLEVEL_OUTOF10: 0

## 2022-02-02 ASSESSMENT — PAIN DESCRIPTION - PAIN TYPE: TYPE: ACUTE PAIN

## 2022-02-02 NOTE — CONSULTS
[address] 1970    History:  Past Medical History:   Diagnosis Date    Ambulatory dysfunction     walker for long distances, SOB with distance    Aortic stenosis     echo 2017    Arthritis     hands and hips    Asthma     Bilateral hilar adenopathy syndrome 6/3/2013    CAD (coronary artery disease)     Dr. Alpesh Champagne [facility]) 04/19/2019    EF= 43%    CHF (congestive heart failure) (HCC)     Chronic pain     COPD (chronic obstructive pulmonary disease) ([address]     pulmonology Dr. Stu Douglas    Depression     Diabetes mellitus ([address]     borderline    Difficult intravenous access     Emphysema of lung ([address]     ESRD (end stage renal disease) on dialysis ([address]     MWF    Fear of needles     Gastric ulcer     GERD (gastroesophageal reflux disease)     Heart valve problem     bicuspic valve    Hemodialysis patient ([address]     History of spinal fracture     work incident    Hx of blood clots     Bilateral lower extremities; stents in place    Hyperlipidemia     Hypertension     MI (myocardial infarction) ([address] 2019    has had 9 MIs. 2019 was the last    Neuromuscular disorder ([address]     due to CVA    Numbness and tingling in left arm     from fistula    Pneumonia     PONV (postoperative nausea and vomiting)     Prolonged emergence from general anesthesia     States requires more medication than most people    Sleep apnea     Uses CPAP    [facility]     7mm thalamic cva 2017 deficts left side, left side weakness    TIA (transient ischemic attack)     Unspecified diseases of blood and blood-forming organs        ECHO:  1/12/22  EF 40-45%  HgA1C:  1/12/22  9.4  Iron Saturation:  1/12/22  18    ACE/ARB: Lisinopril 5mg daily    BB: Toprol XL  50 mg daily    Last Hospital Admission: 1/12/22 acute respiratory failure with hypoxia    Discharge plans: to return home with wife.      Family Present: none  Advanced Directives: patient has advance directives scanned in the chart  Patient's stated goal of care: be more comfortable prior to discharge; long term goals include identify underlying cause of readmissions and formulate plan to keep out of hospital   Lifestyle goal discussed: symptom management, daily weight, diet and fluid restrictions. Patient up in chair at this time on 3 L O2. Pt denies shortness of breath; no complaints of chest pain. Pt states he lives at home with wife. Mentioned his diet largely consists of no added salt, home cooked meals. Pt states he drinks less than 64 ounces of fluid per day. States he takes all his home medications as prescribed. Patient has a scale at home. Patient denies concerns paying for medications. Writer met with patient at bedside for ongoing CHF education. Pt very familiar to writer from previous admissions. Pt verbalizes no added salt and that he does not eat fast food or any high salt foods. States he manages his fluid intake well. States he wakes in the middle of the night panicked and unable to breath. States it has gotten to the point that at times he is afraid to go to sleep. States he has a cpap at home but feels he needs oxygen because when the call the squad and they arrive and put oxygen in place it seems to relieve his respiratory distress. He also stated that he feels better with our bipap in house vs his cpap at home. Writer discussed overnight oximetry study and outpt sleep study potential to assist with the above. Discussed this with Dr Marianna Hamilton who states he will order the overnight oximetry but the bipap would need addressed outpt. He is to discuss this with patient. At multiple times during visit pt verbalized frustration with multiple readmissions and wanting to find a way to stay at home.   States his wife is angry with him that he keeps returning to the hospital.   States he wants formulate a plan to keep him out of hospital. Provided pt with CHF resource line information and offered listening and support. Patient recent weights and intake/output reviewed:    Patient Vitals for the past 96 hrs (Last 3 readings):   Weight   02/02/22 0329 274 lb 7.6 oz (124.5 kg)   02/01/22 1320 262 lb 6.4 oz (119 kg)   02/01/22 0219 260 lb (117.9 kg)         Intake/Output Summary (Last 24 hours) at 2/2/2022 1029  Last data filed at 2/2/2022 0542  Gross per 24 hour   Intake 960 ml   Output 100 ml   Net 860 ml       Notified patient to call the doctor post discharge if he experiences shortness of breath, chest pain, swelling, cough, or weight gain of 2-3 pounds in a day / 5 pounds in a week. Also notified patient to call the doctor if he feels dizzy, increased fatigue, decreased or difficulty urinating. Reviewed the red, yellow, green zones of heart failure self management. Encouraged patient to call the MD with early signs of an acute heart failure exacerbation or when in the yellow zone. Patient provided with both written and verbal education on CHF signs/symptoms, causes, discharge medications, daily weights, low sodium diet, activity, fluid restriction, and follow-up. Pt verbalized understanding. No additional questions at this time. Stated he will alert his nurse with any questions. PATIENT/CAREGIVER TEACHING:    Level of patient/caregiver understanding able to:   Ramirez ] Verbalize understanding [ ] Demonstrate understanding [ ] Teach back   [ ] Needs reinforcement [ ] Other:     Education Time: 15 min    Recommendations:   1. Encourage follow-up appointment compliance. 2. Educate further on fluid restriction 48 oz - 64 oz during inpatient stay so he can understand how to measure intake at home. 3. Review sodium restrictions. Encourage patient to not add table salt to his foods and avoid foods that are high in sodium. 4. Continue to educate on S/S.  Stress the importance of calling the MD with the earliest signs of an acute exacerbation. 5. Emphasize daily weights - instruct patient to call the MD if he gains 2-3 lb in a day or 5 lb in a week. 6. Provided patient with CHF Resource Line for questions and concerns.     Martha Smiley RN      2/2/2022 10:29 AM

## 2022-02-02 NOTE — CARE COORDINATION
CASE MANAGEMENT INITIAL ASSESSMENT        Reviewed chart and completed assessment with:patient  Explained Case Management role/services.     Primary contact information:see below     Health Care Decision Maker :   Primary Decision Maker: Crystal Ann - Spouse - [phone]            Can this person be reached and be able to respond quickly, such as within a few minutes or hours? Yes     Admit date/status:08/29/2021  Diagnosis:Acute respiratory failure  Is this a Readmission?:  Yes, pt returned SOB     Insurance:[employer] [place] [employer]  Precert required for SNF: Yes       3 night stay required: No     Living arrangements, Adls, care needs, prior to admission:Pt lives w/spouse in a double wide trailer, ramp to enter, has no HHC at this time, spouse will drive pt     Transportation:private      Durable Medical Equipment at home:  Walker_x_Cane__RTS__ BSC__Shower Chair__  02__ HHN__ CPAP_x_  BiPap__  Hospital Bed__ W/C___ Other_scooter_________     Services in the home and/or outpatient, prior to [address] [facility] (if applicable)   · Name: Minor Maurer  · Address:  · Dialysis Schedule: MWF 1100  · Phone:  · Fax:     PT/OT recs:not initiated  [facility] Exemption Notification (HEN):none     Barriers to discharge:none     Plan/comments:  Pt plans to d/c home when stable. Pt not active w/any HHC, but has been active w/[facility] in the past.  Puls Ox test scheduled overnight tonight. CM will follow for possible new need for nocturnal O2 or bipap. Pt unsure of name of cpap provider. CM left message for spouse. CM will continue to follow for needs.   Chloé Trejo RN

## 2022-02-02 NOTE — PROGRESS NOTES
02/01/22 2331   NIV Type   Skin Protection for O2 Device No  (pt refused mepilex)   Equipment Type v60   Mode Bilevel   Mask Type Full face mask   Mask Size Large   Settings/Measurements   IPAP 16 cmH20   CPAP/EPAP 6 cmH2O   Rate Ordered 12   Resp 13   FiO2  30 %   Vt Exhaled 650 mL   Minute Volume 9.1 Liters   Mask Leak (lpm) 0 lpm   Comfort Level Good   Using Accessory Muscles No   SpO2 98   Alarm Settings   Alarms On Y   Press Low Alarm 6 cmH2O   High Pressure Alarm 30 cmH2O   Apnea (secs) 20 secs   Resp Rate Low Alarm 6   High Respiratory Rate 40 br/min

## 2022-02-02 NOTE — PROGRESS NOTES
[facility] Progress Note           Subjective/   46y.o. year old male who we are seeing in consultation for ESKD on HD. HPI:  HD completed today with 3 liters removed, post-weight of 116.4 kg. Denies any shortness of breath, still with some substernal chest discomfort. ROS:  Intake adequate, +weak. Objective/   GEN:  Chronically ill, BP (!) (P) 154/85   Pulse (P) 75   Temp (P) 97.8 °F (36.6 °C)   Resp (P) 20   Ht 5' 9\" (1.753 m)   Wt (P) 261 lb 7.5 oz (118.6 kg)   SpO2 100%   BMI (P) 38.61 kg/m²   HEENT: non-icteric, no JVD  CV: S1, S2 without m/r/g; + LE edema  RESP: CTA B without w/r/r; breathing wnl  ABD: +bs, soft, nt, no hsm  SKIN: warm, no rashes  ACCESS: Left IJ [facility]    Data/  Recent Labs     02/01/22  0245 02/02/22  0506   WBC 9.1 8.2   HGB 8.9* 9.1*   HCT 26.6* 27.9*   MCV 88.4 89.3    261     Recent Labs     02/01/22  0245 02/02/22  0506    133*   K 4.2 4.9   CL 97* 95*   CO2 23 24   GLUCOSE 399* 68*   MG  --  1.90   BUN 63* 71*   CREATININE 7.0* 8.1*   LABGLOM 8* 7*   GFRAA 10* 8*       Assessment/     - End stage kidney disease - on dialysis MWF     - Troponin elevation with hx of CAD s/p PCI     - s/p TAVR     - Hypertension     - ZAINAB - on CPAP     - DM2 - uncontrolled     - Anemia of chronic disease - Hb ~9 range    Plan/     - HD completed today per schedule, listed EDW of 116 kg though has been leaving closer to ~119 kg  - DAVON dosing with HD - increased from [address]K to [address]  labs, bp's     ____________________________________  Lew Prakash MD  [facility]  [email or web address]  Office: [phone]

## 2022-02-02 NOTE — PROGRESS NOTES
02/01/22 2019   NIV Type   $NIV $Daily Charge   Skin Assessment Clean, dry, & intact   Skin Protection for O2 Device Yes   Location Nose   Equipment Type V60   Mode Bilevel   Mask Type Full face mask   Mask Size Large   Settings/Measurements   IPAP 16 cmH20   CPAP/EPAP 6 cmH2O   Resp 19   FiO2  30 %   Vt Exhaled 841 mL   Minute Volume 19 Liters   Mask Leak (lpm) 27 lpm   Comfort Level Good   Using Accessory Muscles No

## 2022-02-02 NOTE — PROGRESS NOTES
MD paged. \"ADRIEN Pt is a hemodialysis pt M-W-F. His creatinine is 8.1 and BUN 71 this morning. Thanks! \"

## 2022-02-02 NOTE — PROGRESS NOTES
02/02/22 0313   NIV Type   $NIV $Daily Charge   Equipment Type v60   Mode Bilevel   Mask Type Full face mask   Mask Size Large   Settings/Measurements   IPAP 16 cmH20   CPAP/EPAP 6 cmH2O   Rate Ordered 12   Resp 14   FiO2  30 %   Vt Exhaled 581 mL   Minute Volume 6.9 Liters   Mask Leak (lpm) 0 lpm   Comfort Level Good   Using Accessory Muscles No   SpO2 96   Alarm Settings   Alarms On Y   Press Low Alarm 6 cmH2O   High Pressure Alarm 30 cmH2O   Apnea (secs) 20 secs   Resp Rate Low Alarm 6   High Respiratory Rate 40 br/min

## 2022-02-02 NOTE — CONSULTS
Nutrition Note    RD received consult for CHF nutrition education. Recommend modifying diet to reflect low sodium recommendations and carb control diet d/t A1c of 9.4% on 1/12/21. Pt unavailable after multiple attempts this date, at dialysis. Handouts left at bedside that discuss low sodium, fluid restriction, and weight monitoring. RD also provided information on thirst quenching, low sodium grocery list, and low sodium frozen meals. Will continue to monitor per nutrition standards of care.      Chris Dang, MS, RD, LD on 2/2/2022 at 1:13 PM  Office: [phone]

## 2022-02-02 NOTE — PROGRESS NOTES
Hospitalist Progress Note      PCP: Althea Wheat MD    Date of Admission: 2/1/2022    Chief Complaint: SOB    Subjective: No new c/o. Seen in HD      Medications:  Reviewed    Infusion Medications    dextrose      sodium chloride       Scheduled Medications    aspirin  81 mg Oral Daily    calcium acetate  1 capsule Oral TID WC    clopidogrel  75 mg Oral Daily    DULoxetine  60 mg Oral Daily    fluticasone  2 spray Each Nostril Daily    gabapentin  100 mg Oral TID    linaclotide  145 mcg Oral QAM AC    lisinopril  5 mg Oral Daily    metoprolol succinate  50 mg Oral Daily    pantoprazole  40 mg Oral QAM AC    pravastatin  40 mg Oral Nightly    QUEtiapine  50 mg Oral Nightly    tiotropium-olodaterol  2 puff Inhalation Daily    traZODone  50 mg Oral Nightly    heparin (porcine)  5,000 Units SubCUTAneous 3 times per day    insulin glargine  30 Units SubCUTAneous Nightly    insulin lispro  8 Units SubCUTAneous TID WC    insulin lispro  0-12 Units SubCUTAneous TID WC    insulin lispro  0-6 Units SubCUTAneous Nightly    epoetin siddharth-epbx  2,000 Units IntraVENous Q MWF     PRN Meds: oxyCODONE-acetaminophen, glucose, glucagon (rDNA), dextrose, sodium chloride flush, sodium chloride, ondansetron **OR** ondansetron, acetaminophen **OR** acetaminophen, polyethylene glycol, calcium carbonate, albuterol, dextrose bolus (hypoglycemia) **OR** dextrose bolus (hypoglycemia)      Intake/Output Summary (Last 24 hours) at 2/2/2022 0950  Last data filed at 2/2/2022 0542  Gross per 24 hour   Intake 1440 ml   Output 100 ml   Net 1340 ml       Physical Exam Performed:    /87   Pulse 67   Temp 97.5 °F (36.4 °C) (Oral)   Resp 20   Ht 5' 9\" (1.753 m)   Wt 274 lb 7.6 oz (124.5 kg)   SpO2 100%   BMI 40.53 kg/m²     General appearance: No apparent distress, appears stated age and cooperative. HEENT: Pupils equal, round, and reactive to light. Conjunctivae/corneas clear.   Neck: Supple, with full range of motion. No jugular venous distention. Trachea midline. Respiratory:  Normal respiratory effort. Clear to auscultation, bilaterally without Rales/Wheezes/Rhonchi. Cardiovascular: Regular rate and rhythm with normal S1/S2 without murmurs, rubs or gallops. Abdomen: Soft, non-tender, non-distended with normal bowel sounds. Musculoskeletal: No clubbing, cyanosis or edema bilaterally. Full range of motion without deformity. Skin: Skin color, texture, turgor normal.  No rashes or lesions. Neurologic:  Neurovascularly intact without any focal sensory/motor deficits.  Cranial nerves: II-XII intact, grossly non-focal.  Psychiatric: Alert and oriented, thought content appropriate, normal insight  Capillary Refill: Brisk,< 3 seconds   Peripheral Pulses: +2 palpable, equal bilaterally       Labs:   Recent Labs     02/01/22  0245 02/02/22  0506   WBC 9.1 8.2   HGB 8.9* 9.1*   HCT 26.6* 27.9*    261     Recent Labs     02/01/22  0245 02/02/22  0506    133*   K 4.2 4.9   CL 97* 95*   CO2 23 24   BUN 63* 71*   CREATININE 7.0* 8.1*   CALCIUM 8.6 8.6     Recent Labs     02/01/22  0245   AST 8*   ALT 9*   BILITOT <0.2   ALKPHOS 131*     Recent Labs     02/01/22  0245   INR 1.02     Recent Labs     02/01/22  0809 02/01/22  1046 02/01/22  1452   TROPONINI 0.21* 0.21* 0.19*       Urinalysis:      Lab Results   Component Value Date    NITRU Negative 09/07/2021    WBCUA 10-20 09/07/2021    BACTERIA 1+ 09/07/2021    RBCUA 3-4 09/07/2021    BLOODU TRACE-LYSED 09/07/2021    SPECGRAV 1.015 09/07/2021    GLUCOSEU 100 09/07/2021       Consults:    IP CONSULT TO PULMONOLOGY  IP CONSULT TO NEPHROLOGY  IP CONSULT TO HEART FAILURE NURSE/COORDINATOR  IP CONSULT TO DIETITIAN      Assessment/Plan:    Active Hospital Problems    Diagnosis     Ischemic cardiomyopathy [I25.5]      Priority: High    Type 2 diabetes, uncontrolled, with neuropathy (HCC) [E11.40, E11.65]      Priority: High    CAD S/P percutaneous coronary angioplasty [I25.10, Z98.61]      Priority: High    S/P TAVR (transcatheter aortic valve replacement) [Z95.2]      Priority: Medium    Asthma-COPD overlap syndrome (HCC) [J44.9]      Priority: Medium    ESRD (end stage renal disease) on dialysis (HCC) [N18.6, Z99.2]     Dyspnea [R06.00]     ZAINAB on CPAP [G47.33, Z99.89]     Obesity (BMI 30-39. 9) [E66.9]             Dyspnea, Severe Asthma-COPD, ZAINAB on CPAP  -  Multifactorial dyspnea.  Primary issue at this time seems to be airflow obstruction, both intrathoracic and extrathoracic based on exam.  His upper airway seems crowded and he may have vocal cord dysfunction.  -  Seems likely to need home O2 particularly while lying supine to sleep.  Will start a home O2 evaluation. Leonel Hope would likely benefit from O2 bled into his home CPAP circuit.  He also needs an updated PSG and CPAP/BIPAP titration.  His last complete study was in 2015. Leonel Hope had a home study in 2019 documenting significant hypoxemia and this occurred even while using CPAP. -  Will trial nocturnal and nap BiPAP w/ empiric settings 16 / 10.  Home CPAP setting is 8 - 12 cm H2O. -  Continue home Stiolto plus as-needed short-acting bronchodilators. -  Pulmonary input will be requested. Leonel Hope has not been seen in outpatient pulmonary clinic for over a year, primarily because he is hospitalized so often.  More often than not he sees his pulmonologist, Dr. Damon Falcon, on an inpatient basis.     ESRD, HTN, anemia of CKD  -  Currently patient seems to be euvolemic.  -  Nephrology assistance requested for HD if patient remains hospitalized more than one day.     CAD s/p PCI,  HFrEF, AS s/p TAVR  -  Chronically elevated troponin.  Recent NSTEMI s/p PATRICIA to mid RCA.  No e/o acute stent thrombosis.  Will trend out current troponin. -  Per last (limited) TTE 1/12/22 LVEF = 40-45%.  Per last complete TTE 9/7/21 aortic valve prosthesis was functioning normally. -  Continue aspirin, plavix, statin, metoprolol XL, lisinopril.   -  Consider addition of a long acting nitrate.     DM2 w/ hyperglycemia  -  Hold all oral antidiabetic agents.  Start s.c. Insulin regimen based on home regimen.       DVT Prophylaxis: SQ Heparin/IPC    Recent Labs     02/01/22  0245 02/02/22  0506    261     Diet: ADULT DIET; Regular  Code Status: Full Code      PT/OT Eval Status: not yet ordered.      Dispo - Placed in OBServation. Patient is likely to remain in-house at least until Wed/Thurs 2/3 Feb pending clinical course and subspecialty recs.      Prince Carrera MD

## 2022-02-02 NOTE — PROGRESS NOTES
INPATIENT PULMONARY CRITICAL CARE PROGRESS NOTE      Reason for visit      multifactorial dyspnea with suspected intrathoracic and intrathoracic airflow obstruction    SUBJECTIVE: Patient when seen this morning states that he feels better, patient thinks that the BiPAP is better for him as compared to CPAP he has at home along with that patient also says he wants to know if he qualifies for oxygen at nighttime because he feels better with oxygen, patient does not have any increasing cough or expectoration or any chest pain, patient was afebrile and hemodynamically maintained, patient was on 3 L of nasal cannula oxygen was saturating 100% when evaluated, patient was in sinus rhythm on the monitor, patient's glycemic control was suboptimally controlled, patient was about to go for dialysis when seen, patient's limited fluid balance was -1.4 L, patient was alert and oriented, no other pertinent review of system of concern             Physical Exam:  Blood pressure 124/65, pulse 78, temperature 98 °F (36.7 °C), resp. rate 20, height 5' 9\" (1.753 m), weight 256 lb 9.9 oz (116.4 kg), SpO2 100 %.'     Constitutional:  No acute distress.   HENT: No facial asymmetry. No thyromegaly. Eyes:  Conjunctivae are normal. Pupils equal, round, and reactive to light. No scleral icterus. Neck: . No tracheal deviation present. No obvious thyroid mass.  Short enlarged neck  Cardiovascular: Normal rate, regular rhythm, normal heart sounds.  No right ventricular heave some lower extremity edema. Pulmonary/Chest: No wheezes.    Minimal bilateral rales.  Chest wall is not dull to percussion.  No accessory muscle usage or stridor. Decreased breath sound density, HD catheter present  Abdominal: Soft. Bowel sounds present. No distension or hernia. No tenderness. Obese  Musculoskeletal: No cyanosis. No clubbing. No obvious joint deformity. Lymphadenopathy: No cervical or supraclavicular adenopathy. Skin: Skin is warm and dry.  No rash or nodules on the exposed extremities. ,  Tattoos present  Neurologic:  Alert and communicative with no focal cranial nerve deficits          Results:  CBC:   Recent Labs     02/01/22 0245 02/02/22  0506   WBC 9.1 8.2   HGB 8.9* 9.1*   HCT 26.6* 27.9*   MCV 88.4 89.3    261     BMP:   Recent Labs     02/01/22  0245 02/02/22  0506    133*   K 4.2 4.9   CL 97* 95*   CO2 23 24   BUN 63* 71*   CREATININE 7.0* 8.1*     LIVER PROFILE:   Recent Labs     02/01/22 0245   AST 8*   ALT 9*   BILITOT <0.2   ALKPHOS 131*     PT/INR:   Recent Labs     02/01/22 0245   PROTIME 11.5   INR 1.02       Imaging:  I have reviewed radiology images personally. CT CHEST PULMONARY EMBOLISM W CONTRAST   Final Result   Minimal vascular congestion without edema. No identified pulmonary embolism. Interval development of a 4-5 mm noncalcified left lower lobe nodule. This   could be followed annually as indicated per Fleischner society criteria. Interval development of minimally prominent lymph nodes. These are favored   to be reactive but follow-up in 6 months recommended to ensure stability,   sooner if indicated clinically. RECOMMENDATIONS:   Unavailable         XR CHEST PORTABLE   Final Result   Mild interstitial prominence which likely represents mild vascular congestion   or interstitial edema. Atypical or viral infection less likely.   Short-term   follow-up recommended if symptoms persist.         POC [address]   Final Result        Echo Limited    Result Date: 1/12/2022  Transthoracic Echocardiography Report (TTE)  Demographics   Patient Name       Yessenia Fisher   Date of Study      01/12/2022         Gender              Male   Patient Number     [id number]         Date of Birth       1970   Visit Number       [id number]          Age                 46 year(s)   Accession Number   [id number]         Room Number         4594   Corporate ID       [id number] Sonographer         Adrianna áSnchez, RT   Ordering Physician Alanna Harrell,  Interpreting        [facility]                 Physician           Kendy Chakraborty MD, [facility]  Procedure Type of Study   TTE procedure:ECHOCARDIOGRAM LIMITED. Procedure Date Date: 01/12/2022 Start: 08:05 AM Study Location: [address] [place] [address] - Echo Lab Technical Quality: Adequate visualization Indications:Myocardial infarction. Patient Status: Routine Height: 69 inches Weight: 252.01 pounds BSA: 2.28 m2 BMI: 37.21 kg/m2 BP: 203/106 mmHg  Conclusions   Summary  Limited only f/u for LVEF and RVF. The left ventricular systolic function is mildly reduced with an ejection  fraction of 40-45 %. There is hypokinesis of the apex and inferior walls. There is a very small circumferential pericardial effusion noted. No tamponade physiology. The right ventricle is normal in size and function. Signature   ------------------------------------------------------------------  Electronically signed by Kendy Chakraborty MD, [facility]  (Interpreting physician) on 01/12/2022 at 12:21 PM  ------------------------------------------------------------------   Findings   Left Ventricle  The left ventricular systolic function is mildly reduced with an ejection  fraction of 40-45 %. There is hypokinesis of the apex and inferior walls. The left ventricle was not well visualized. Right Ventricle  The right ventricle is normal in size and function. TAPSE is measured at 13 mm. S'prime velocity is measured at 7 cm/s. Right Atrium  The right atrium is normal in size at 13 cm2. Pericardial Effusion  There is a small circumferential pericardial effusion noted. No tamponade physiology.   M-Mode/2D Measurements (cm)      XR CHEST PORTABLE    Result Date: 2/1/2022  EXAMINATION: ONE XRAY VIEW OF THE CHEST 2/1/2022 2:41 am COMPARISON: 01/12/2022 HISTORY: [address] PROVIDED HISTORY: SOB TECHNOLOGIST PROVIDED HISTORY: Reason for exam:->SOB Reason for Exam: sob FINDINGS: EKG leads overlie the chest.  Prosthetic heart valve again identified. Left-sided dialysis catheter remains in place. Heart size is normal.  No pneumothorax. There is some mild central interstitial prominence. No pneumothorax or effusion. Mild interstitial prominence which likely represents mild vascular congestion or interstitial edema. Atypical or viral infection less likely. Short-term follow-up recommended if symptoms persist.     XR CHEST PORTABLE    Result Date: 1/12/2022  EXAMINATION: ONE XRAY VIEW OF THE CHEST 1/12/2022 1:14 am COMPARISON: 11/30/2021 HISTORY: ORDERING SYSTEM PROVIDED HISTORY: SOB TECHNOLOGIST PROVIDED HISTORY: Reason for exam:->SOB Reason for Exam: sob FINDINGS: Portable study is limited by by body habitus. Heart size is within normal limits. Status post TAVR. Hazy opacity at the lung bases likely represents superimposed soft tissues. No confluent airspace disease, pneumothorax or pleural effusion. There is a left IJ tunneled dialysis catheter in good position with the tip just below the cavoatrial junction. No acute bone finding. Portable study is limited by body habitus. Lungs are grossly clear. Status post TAVR. Left IJ tunneled dialysis catheter is in good position. CT CHEST PULMONARY EMBOLISM W CONTRAST    Result Date: 2/1/2022  EXAMINATION: CTA OF THE CHEST 2/1/2022 3:55 am TECHNIQUE: CTA of the chest was performed after the administration of intravenous contrast.  Multiplanar reformatted images are provided for review. MIP images are provided for review. Dose modulation, iterative reconstruction, and/or weight based adjustment of the mA/kV was utilized to reduce the radiation dose to as low as reasonably achievable.  COMPARISON: Portable chest performed earlier today at 0230 hours and CT dated 10/31/2019 HISTORY: ORDERING SYSTEM PROVIDED HISTORY: SOB, rule out PE TECHNOLOGIST PROVIDED HISTORY: Reason for exam:->SOB, rule out PE Decision Support Exception - unselect if not a suspected or confirmed emergency medical condition->Emergency Medical Condition (MA) Reason for Exam: sob x 1 day Relevant Medical/Surgical History: cardaic stent, TAVR FINDINGS: Pulmonary Arteries: Pulmonary arteries are adequately opacified for evaluation. No evidence of intraluminal filling defect to suggest pulmonary embolism. Main pulmonary artery is normal in caliber. Mediastinum: Patient is status post aortic valve replacement. Mild cardiomegaly. No pericardial effusion. No aortic dissection. Scattered vascular calcifications. Multiple bilateral mediastinal and hilar lymph nodes. Many of these appear borderline to mildly enlarged. 1 example in the right hilum measures 2 x 1.3 cm. Upper right paratracheal lymph node measures 2.1 x 1.1 cm. Lungs/pleura: No pneumothorax or pleural effusion. Minimal vascular congestion without evidence of edema. Pleural base calcified nodular density in the posterior left lower lobe is unchanged. A noncalcified nodule in the base of the left lower lobe on series 3, image 69 is new and measures approximately 4-5 mm. No dominant lung mass identified. Upper Abdomen: Cholecystectomy clips. Low-attenuation left adrenal nodule is unchanged measuring 1.8 cm and likely represents an adenoma. Minimal pancreatic calcifications suggestive of chronic pancreatitis. Visualized portions of the upper abdomen demonstrate no acute abnormality. Soft Tissues/Bones: Degenerative changes are scattered in the spine. Lower thoracic vertebral bodies demonstrate minimal depression of superior endplates. These are unchanged. Minimal vascular congestion without edema. No identified pulmonary embolism. Interval development of a 4-5 mm noncalcified left lower lobe nodule. This could be followed annually as indicated per Fleischner society criteria.  Interval development of minimally prominent lymph nodes. These are favored to be reactive but follow-up in 6 months recommended to ensure stability, sooner if indicated clinically. RECOMMENDATIONS: Unavailable     VL DUP UPPER EXTREMITY ARTERIES RIGHT    Result Date: 1/17/2022  Upper Extremities Arterial Duplex  Demographics   Patient Name       Katya Holley   Date of Study      01/17/2022        Gender              Male   Patient Number     [id number]        Date of Birth       1970   Visit Number       [id number]         Age                 46 year(s)   Accession Number   [id number]        Room Number         9575   Corporate ID       [id number]           Sonographer         April D eLeon RDMS, RVT   Ordering Physician Mary Evans, CNP  Interpreting        [facility]                                       Physician           Readers                                                           Willis Salazar MD  Procedure Type of Study:   Extremities Arteries:Upper Extremities Arterial Duplex, VL UPPER EXTREMITY  ARTERIES DUPLEX RIGHT. Vascular Sonographer Report  Indications for Study:Arm pain. Additional Indications:Patient complains of right wrist pain and swelling since right radial cath procedure performed on 1/15/22. Impressions Right Impression The right radial artery demonstrates normal triphasic flow with no evidence of pseudoaneurysm. The right radial veins demonstrate normal phasic flow with no evidence of thrombosis. Conclusions   Summary   Normal right radial artery and veins. Signature   ------------------------------------------------------------------  Electronically signed by Jer Pierre MD (Interpreting  physician) on 01/17/2022 at 02:46 PM  ------------------------------------------------------------------  Patient Status:Routine.  [facility] - Vascular Lab. Technical Quality:Adequate visualization. Risk Factors   - The patient's risk factor(s) include: prior valve surgery/procedure,     chronic lung disease, renal failure currently treated with dialysis,     insulin-treated diabetes mellitus, dyslipidemia, obesity, treated arterial     hypertension and prior MI .   - The patient has a former tobacco history of 16 years . Velocities are measured in cm/s ; Diameters are measured in mm Right Upper Extremities Duplex Measurements +-----------+----+----------------+ ! Location   ! PSV ! Wave Description! +-----------+----+----------------+ ! Prox [id number]. 8! Multiphasic     ! +-----------+----+----------------+    POC US [facility]    Result Date: 2/1/2022  POCUS_Cardiac:     Exam Information:         Exam type:  Clinically indicated     Indication(s) for Exam:         The exam was performed with the following indications[de-identified]  Chest Pain, Dyspnea, Concern for effusion     Views Obtained & Images Saved for These Views:          The pericardial sac, myocardium, 4 chambers, and IVC were identified using the following views[de-identified]  Subxiphoid, Parasternal long-axis, Apical 4-chamber     Findings:         Pericardial Effusion:  No pericardial effusion         Cardiac Activity:  Cardiac activity normal         LV function:  Indeterminate         RV diameter:  Normal     Interpretation:         Indeterminate Electronically signed by Jeanna Braun on Tuesday, February 1, 2022 at 2:59 AM    NM Cardiac Stress Test Nuclear Imaging    Result Date: 1/13/2022  Cardiac Perfusion Imaging  Demographics   Patient Name       Katya Holley   Date of Study      01/13/2022         Gender              Male   Patient Number     [id number]         Date of Birth       1970   Visit Number       [id number]          Age                 46 year(s)   Accession Number   [id number]         Room Number         9942   Corporate ID       [id number]            NM Technician       [place]                     RN                 Physician           Melissa Arciniega MD, Alisha Mathews RN   Ordering Physician Nona Harrison MD, [facility]   The procedure was explained in detail to the patient. Risks,  complications and alternative treatments were reviewed. Written consent  was obtained. Procedure Procedure Type:   Nuclear Stress Test:Pharmacological, NM MYOCARDIAL SPECT REST EXERCISE OR  RX   Study location: [facility] - Nuclear Medicine   Indications: Abnormal rest ECG. Hospital Status: Inpatient. Height: 69 inches Weight: 252 pounds  Risk Factors   The patient risk factors include:prior PCI;obesity, cerebrovascular disease,  former tobacco use, treated hypercholesterolemia, treated hypertension,  family history of premature CAD, insulin treated diabetes mellitus, chronic  lung disease, prior valve surgery/procedure , dyslipidemia, renal failure  currently treated with dialysis , prior heart failure , prior MI and (pack  years: 12). Conclusions   Summary  Abnormal high risk myocardial perfusion study. There is reduced activity at rest and stress within the basal anterior,  apical, high lateral, and infero-basal segments which is similar at rest and  stress. There is no significant or clear ischemia. There is severe left ventricular dilatation. The estimated left ventricular function is 27%. Recommendation  These findings are consistent with a dilated non-ischemic cardiomyopathy.   Recommend referral to advanced heart failure team.  Stress Protocols Resting HR:85 bpm  Resting BP:142/80 mmHg  Stress Protocol:Pharmacologic - Lexiscan's  Peak HR:89 bpm                           HR/BP product:59614  Peak BP:173/85 mmHg  Predicted HR: 167 bpm  % of predicted HR: 53  Test duration: 4 min  Reason for termination:Completed   Symptoms  Patient developed shortness of breath , likely related to lexiscan. Symptoms resolved with rest.   Complications  Procedure complication was none. Procedure Medications   - Lexiscan I.V. 0.4 mg.  Imaging Protocols   - One Day   Rest                          Stress   Isotope:Myoview/Tetrofosmin   Isotope: Myoview/Tetrofosmin  Isotope dose:11 mCi           Isotope dose:31.9 mCi  Administration Route:I.V. Administration Route:I.V.  Date:01/13/2022 08:45         Date:01/13/2022 10:30                                 Technique:      Gated  Imaging Results   Applied corrections   - Attenuation correction  applied     Stress ejection    Ejection fraction:27 %    EDV :272 ml    ESV :199 ml    Stroke volume :73 ml    LV mass :245 gr  Medical History  Signatures   ------------------------------------------------------------------  Electronically signed by Arianna Santana MD, [facility]  (Interpreting physician) on 01/13/2022 at 14:26  ------------------------------------------------------------------              Assessment:  Principal Problem:    CAD S/P percutaneous coronary angioplasty  Active Problems:    Type 2 diabetes, uncontrolled, with neuropathy ([address]    Ischemic cardiomyopathy    Asthma-COPD overlap syndrome (HCC)    S/P TAVR (transcatheter aortic valve replacement)    Obesity (BMI 30-39.9)    ZAINAB on CPAP    Dyspnea    ESRD (end stage renal disease) on dialysis [facility])  Resolved Problems:    * No resolved hospital problems.  *          Plan:     · Oxygen supplementation,if required,  to keep saturation being 90-94% only  · Please titrate down the oxygen as per the above parameters  · Will do an overnight pulse oximetry on on BiPAP on room air to assess for any nocturnal hypoxemia which may require patient to be on any supplemental oxygen  · BIPAP on intermittent basis -needs to continue and be complaint with it at home   · Patient is to have a repeat sleep study as an outpatient to see if patient can be fit for any BiPAP  · Pulmonary toilet  · Patient's x-ray chest shows patient to have mild pulmonary venous congestion and patient CT of the chest did not show any pneumonic process but has a lung nodule which is new along with reactive adenopathy  · Patient does not need any antibiotics from pulmonary standpoint of view  · HD as per nephrology  · Patient's PFT flow-volume loop in 2016 did not show any significant intrathoracic or extrathoracic or fixed obstruction at that time  · Patient will benefit from a repeat PFT as an outpatient  · Antihypertensives as per IM/nephrology   · Bronchodilators  · We will hold off on any systemic steroids at this time  · Lantus insulin as per blood glucose monitoring as per IM  · Blood glucose monitoring with sliding scale insulin  · Trend hemodynamics and cardiac rhythm closely  · Diet and life style modifications  · Patient needs to lose weight   · PUD and DVT prophylaxis     Case d/w patient and nursing   Case discussed with case management          Electronically signed by:  Marliyn Mccrary MD    2/2/2022    5:09 PM.

## 2022-02-03 ENCOUNTER — TELEPHONE (OUTPATIENT)
Dept: FAMILY MEDICINE CLINIC | Age: 52
End: 2022-02-03

## 2022-02-03 LAB
ANION GAP SERPL CALCULATED.3IONS-SCNC: 16 MMOL/L (ref 3–16)
BASOPHILS ABSOLUTE: 0.1 K/UL (ref 0–0.2)
BASOPHILS RELATIVE PERCENT: 0.9 %
BUN BLDV-MCNC: 50 MG/DL (ref 7–20)
CALCIUM SERPL-MCNC: 8 MG/DL (ref 8.3–10.6)
CHLORIDE BLD-SCNC: 95 MMOL/L (ref 99–110)
CO2: 22 MMOL/L (ref 21–32)
CREAT SERPL-MCNC: 6.5 MG/DL (ref 0.9–1.3)
EOSINOPHILS ABSOLUTE: 0.5 K/UL (ref 0–0.6)
EOSINOPHILS RELATIVE PERCENT: 6.7 %
GFR AFRICAN AMERICAN: 11
GFR NON-AFRICAN AMERICAN: 9
GLUCOSE BLD-MCNC: 193 MG/DL (ref 70–99)
GLUCOSE BLD-MCNC: 206 MG/DL (ref 70–99)
GLUCOSE BLD-MCNC: 212 MG/DL (ref 70–99)
GLUCOSE BLD-MCNC: 224 MG/DL (ref 70–99)
GLUCOSE BLD-MCNC: 261 MG/DL (ref 70–99)
HCT VFR BLD CALC: 25.8 % (ref 40.5–52.5)
HEMOGLOBIN: 8.6 G/DL (ref 13.5–17.5)
LYMPHOCYTES ABSOLUTE: 1.1 K/UL (ref 1–5.1)
LYMPHOCYTES RELATIVE PERCENT: 14.4 %
MAGNESIUM: 2 MG/DL (ref 1.8–2.4)
MCH RBC QN AUTO: 29.6 PG (ref 26–34)
MCHC RBC AUTO-ENTMCNC: 33.2 G/DL (ref 31–36)
MCV RBC AUTO: 89.3 FL (ref 80–100)
MONOCYTES ABSOLUTE: 0.5 K/UL (ref 0–1.3)
MONOCYTES RELATIVE PERCENT: 6.4 %
NEUTROPHILS ABSOLUTE: 5.4 K/UL (ref 1.7–7.7)
NEUTROPHILS RELATIVE PERCENT: 71.6 %
PDW BLD-RTO: 16.2 % (ref 12.4–15.4)
PERFORMED ON: ABNORMAL
PLATELET # BLD: 237 K/UL (ref 135–450)
PMV BLD AUTO: 7.9 FL (ref 5–10.5)
POTASSIUM SERPL-SCNC: 4.5 MMOL/L (ref 3.5–5.1)
RBC # BLD: 2.89 M/UL (ref 4.2–5.9)
SODIUM BLD-SCNC: 133 MMOL/L (ref 136–145)
WBC # BLD: 7.6 K/UL (ref 4–11)

## 2022-02-03 PROCEDURE — 85025 COMPLETE CBC W/AUTO DIFF WBC: CPT

## 2022-02-03 PROCEDURE — 99232 SBSQ HOSP IP/OBS MODERATE 35: CPT | Performed by: INTERNAL MEDICINE

## 2022-02-03 PROCEDURE — 80048 BASIC METABOLIC PNL TOTAL CA: CPT

## 2022-02-03 PROCEDURE — 6360000002 HC RX W HCPCS: Performed by: INTERNAL MEDICINE

## 2022-02-03 PROCEDURE — 6370000000 HC RX 637 (ALT 250 FOR IP): Performed by: INTERNAL MEDICINE

## 2022-02-03 PROCEDURE — 94640 AIRWAY INHALATION TREATMENT: CPT

## 2022-02-03 PROCEDURE — G0378 HOSPITAL OBSERVATION PER HR: HCPCS

## 2022-02-03 PROCEDURE — 36415 COLL VENOUS BLD VENIPUNCTURE: CPT

## 2022-02-03 PROCEDURE — 96372 THER/PROPH/DIAG INJ SC/IM: CPT

## 2022-02-03 PROCEDURE — 94762 N-INVAS EAR/PLS OXIMTRY CONT: CPT

## 2022-02-03 PROCEDURE — 1200000000 HC SEMI PRIVATE

## 2022-02-03 PROCEDURE — 83735 ASSAY OF MAGNESIUM: CPT

## 2022-02-03 PROCEDURE — 2500000003 HC RX 250 WO HCPCS: Performed by: INTERNAL MEDICINE

## 2022-02-03 RX ADMIN — CALCIUM ACETATE 667 MG: 667 CAPSULE ORAL at 09:32

## 2022-02-03 RX ADMIN — INSULIN LISPRO 4 UNITS: 100 INJECTION, SOLUTION INTRAVENOUS; SUBCUTANEOUS at 18:41

## 2022-02-03 RX ADMIN — INSULIN LISPRO 8 UNITS: 100 INJECTION, SOLUTION INTRAVENOUS; SUBCUTANEOUS at 18:42

## 2022-02-03 RX ADMIN — QUETIAPINE FUMARATE 50 MG: 25 TABLET ORAL at 20:28

## 2022-02-03 RX ADMIN — DULOXETINE HYDROCHLORIDE 60 MG: 60 CAPSULE, DELAYED RELEASE ORAL at 09:32

## 2022-02-03 RX ADMIN — INSULIN LISPRO 4 UNITS: 100 INJECTION, SOLUTION INTRAVENOUS; SUBCUTANEOUS at 09:34

## 2022-02-03 RX ADMIN — CALCIUM ACETATE 667 MG: 667 CAPSULE ORAL at 18:41

## 2022-02-03 RX ADMIN — GABAPENTIN 100 MG: 100 CAPSULE ORAL at 09:32

## 2022-02-03 RX ADMIN — GABAPENTIN 100 MG: 100 CAPSULE ORAL at 20:28

## 2022-02-03 RX ADMIN — TRAZODONE HYDROCHLORIDE 50 MG: 50 TABLET ORAL at 20:28

## 2022-02-03 RX ADMIN — OXYCODONE AND ACETAMINOPHEN 1 TABLET: 7.5; 325 TABLET ORAL at 09:47

## 2022-02-03 RX ADMIN — HEPARIN SODIUM 5000 UNITS: 5000 INJECTION INTRAVENOUS; SUBCUTANEOUS at 20:28

## 2022-02-03 RX ADMIN — OXYCODONE AND ACETAMINOPHEN 1 TABLET: 7.5; 325 TABLET ORAL at 20:28

## 2022-02-03 RX ADMIN — PANTOPRAZOLE SODIUM 40 MG: 40 TABLET, DELAYED RELEASE ORAL at 05:37

## 2022-02-03 RX ADMIN — METOPROLOL SUCCINATE 50 MG: 50 TABLET, EXTENDED RELEASE ORAL at 09:32

## 2022-02-03 RX ADMIN — TIOTROPIUM BROMIDE AND OLODATEROL 2 PUFF: 3.124; 2.736 SPRAY, METERED RESPIRATORY (INHALATION) at 11:46

## 2022-02-03 RX ADMIN — INSULIN LISPRO 2 UNITS: 100 INJECTION, SOLUTION INTRAVENOUS; SUBCUTANEOUS at 20:29

## 2022-02-03 RX ADMIN — ASPIRIN 81 MG 81 MG: 81 TABLET ORAL at 09:32

## 2022-02-03 RX ADMIN — INSULIN LISPRO 8 UNITS: 100 INJECTION, SOLUTION INTRAVENOUS; SUBCUTANEOUS at 09:33

## 2022-02-03 RX ADMIN — PRAVASTATIN SODIUM 40 MG: 40 TABLET ORAL at 20:28

## 2022-02-03 RX ADMIN — CLOPIDOGREL BISULFATE 75 MG: 75 TABLET ORAL at 09:32

## 2022-02-03 RX ADMIN — LISINOPRIL 5 MG: 10 TABLET ORAL at 09:32

## 2022-02-03 RX ADMIN — INSULIN GLARGINE 30 UNITS: 100 INJECTION, SOLUTION SUBCUTANEOUS at 20:29

## 2022-02-03 ASSESSMENT — PAIN DESCRIPTION - LOCATION: LOCATION: BACK;HIP

## 2022-02-03 ASSESSMENT — PAIN SCALES - GENERAL
PAINLEVEL_OUTOF10: 8
PAINLEVEL_OUTOF10: 0
PAINLEVEL_OUTOF10: 9
PAINLEVEL_OUTOF10: 0

## 2022-02-03 ASSESSMENT — PAIN DESCRIPTION - ONSET: ONSET: ON-GOING

## 2022-02-03 ASSESSMENT — PAIN DESCRIPTION - DESCRIPTORS: DESCRIPTORS: SHARP

## 2022-02-03 ASSESSMENT — PAIN DESCRIPTION - ORIENTATION: ORIENTATION: LOWER

## 2022-02-03 ASSESSMENT — PAIN DESCRIPTION - PROGRESSION: CLINICAL_PROGRESSION: NOT CHANGED

## 2022-02-03 ASSESSMENT — PAIN DESCRIPTION - PAIN TYPE: TYPE: CHRONIC PAIN

## 2022-02-03 ASSESSMENT — PAIN - FUNCTIONAL ASSESSMENT: PAIN_FUNCTIONAL_ASSESSMENT: PREVENTS OR INTERFERES SOME ACTIVE ACTIVITIES AND ADLS

## 2022-02-03 ASSESSMENT — PAIN DESCRIPTION - FREQUENCY: FREQUENCY: CONTINUOUS

## 2022-02-03 NOTE — PROGRESS NOTES
Hospitalist Progress Note      PCP: Pablo Espinoza MD    Date of Admission: 2/1/2022    Chief Complaint: SOB    Subjective: No new c/o. Seen in HD      Medications:  Reviewed    Infusion Medications    dextrose      sodium chloride       Scheduled Medications    aspirin  81 mg Oral Daily    calcium acetate  1 capsule Oral TID WC    clopidogrel  75 mg Oral Daily    DULoxetine  60 mg Oral Daily    fluticasone  2 spray Each Nostril Daily    gabapentin  100 mg Oral TID    linaclotide  145 mcg Oral QAM AC    lisinopril  5 mg Oral Daily    metoprolol succinate  50 mg Oral Daily    pantoprazole  40 mg Oral QAM AC    pravastatin  40 mg Oral Nightly    QUEtiapine  50 mg Oral Nightly    tiotropium-olodaterol  2 puff Inhalation Daily    traZODone  50 mg Oral Nightly    heparin (porcine)  5,000 Units SubCUTAneous 3 times per day    insulin glargine  30 Units SubCUTAneous Nightly    insulin lispro  8 Units SubCUTAneous TID WC    insulin lispro  0-12 Units SubCUTAneous TID WC    insulin lispro  0-6 Units SubCUTAneous Nightly    epoetin siddharth-epbx  2,000 Units IntraVENous Q MWF     PRN Meds: heparin (porcine), oxyCODONE-acetaminophen, glucose, glucagon (rDNA), dextrose, sodium chloride flush, sodium chloride, ondansetron **OR** ondansetron, acetaminophen **OR** acetaminophen, polyethylene glycol, calcium carbonate, albuterol, dextrose bolus (hypoglycemia) **OR** dextrose bolus (hypoglycemia)      Intake/Output Summary (Last 24 hours) at 2/3/2022 1014  Last data filed at 2/3/2022 0159  Gross per 24 hour   Intake 1120 ml   Output 3400 ml   Net -2280 ml       Physical Exam Performed:    BP (!) 152/68   Pulse 82   Temp 98.3 °F (36.8 °C) (Oral)   Resp 18   Ht 5' 9\" (1.753 m)   Wt 256 lb 9.9 oz (116.4 kg)   SpO2 95%   BMI 37.90 kg/m²     General appearance: No apparent distress, appears stated age and cooperative. HEENT: Pupils equal, round, and reactive to light. Conjunctivae/corneas clear.   Neck: Supple, with full range of motion. No jugular venous distention. Trachea midline. Respiratory:  Normal respiratory effort. Clear to auscultation, bilaterally without Rales/Wheezes/Rhonchi. Cardiovascular: Regular rate and rhythm with normal S1/S2 without murmurs, rubs or gallops. Abdomen: Soft, non-tender, non-distended with normal bowel sounds. Musculoskeletal: No clubbing, cyanosis or edema bilaterally. Full range of motion without deformity. Skin: Skin color, texture, turgor normal.  No rashes or lesions. Neurologic:  Neurovascularly intact without any focal sensory/motor deficits.  Cranial nerves: II-XII intact, grossly non-focal.  Psychiatric: Alert and oriented, thought content appropriate, normal insight  Capillary Refill: Brisk,< 3 seconds   Peripheral Pulses: +2 palpable, equal bilaterally       Labs:   Recent Labs     02/01/22 0245 02/02/22  0506 02/03/22  0656   WBC 9.1 8.2 7.6   HGB 8.9* 9.1* 8.6*   HCT 26.6* 27.9* 25.8*    261 237     Recent Labs     02/01/22  0245 02/02/22  0506 02/03/22  0656    133* 133*   K 4.2 4.9 4.5   CL 97* 95* 95*   CO2 23 24 22   BUN 63* 71* 50*   CREATININE 7.0* 8.1* 6.5*   CALCIUM 8.6 8.6 8.0*     Recent Labs     02/01/22 0245   AST 8*   ALT 9*   BILITOT <0.2   ALKPHOS 131*     Recent Labs     02/01/22 0245   INR 1.02     Recent Labs     02/01/22  0809 02/01/22  1046 02/01/22  1452   TROPONINI 0.21* 0.21* 0.19*       Urinalysis:      Lab Results   Component Value Date    NITRU Negative 09/07/2021    WBCUA 10-20 09/07/2021    BACTERIA 1+ 09/07/2021    RBCUA 3-4 09/07/2021    BLOODU TRACE-LYSED 09/07/2021    SPECGRAV 1.015 09/07/2021    GLUCOSEU 100 09/07/2021       Consults:    IP CONSULT TO PULMONOLOGY  IP CONSULT TO NEPHROLOGY  IP CONSULT TO HEART FAILURE NURSE/COORDINATOR  IP CONSULT TO DIETITIAN      Assessment/Plan:    Active Hospital Problems    Diagnosis     Ischemic cardiomyopathy [I25.5]      Priority: High    Type 2 diabetes, uncontrolled, with neuropathy [facility] [E11.40, E11.65]      Priority: High    CAD S/P percutaneous coronary angioplasty [I25.10, Z98.61]      Priority: High    S/P TAVR (transcatheter aortic valve replacement) [Z95.2]      Priority: Medium    Asthma-COPD overlap syndrome (HCC) [J44.9]      Priority: Medium    ESRD (end stage renal disease) on dialysis ([facility] [N18.6, Z99.2]     Dyspnea [R06.00]     ZAINAB on CPAP [G47.33, Z99.89]     Obesity (BMI 30-39. 9) [E66.9]             Dyspnea, Severe Asthma-COPD, ZAINAB on CPAP  -  Multifactorial dyspnea.  Primary issue at this time seems to be airflow obstruction, both intrathoracic and extrathoracic based on exam.  His upper airway seems crowded and he may have vocal cord dysfunction.  -  Seems likely to need home O2 particularly while lying supine to sleep.  Will start a home O2 evaluation. [facility] would likely benefit from O2 bled into his home CPAP circuit.  He also needs an updated PSG and CPAP/BIPAP titration.  His last complete study was in 2015. [facility] had a home study in 2019 documenting significant hypoxemia and this occurred even while using CPAP. -  Will trial nocturnal and nap BiPAP w/ empiric settings 16 / 10.  Home CPAP setting is 8 - 12 cm H2O. -  Continue home Stiolto plus as-needed short-acting bronchodilators. -  Pulmonary input will be requested. [facility] has not been seen in outpatient pulmonary clinic for over a year, primarily because he is hospitalized so often.  More often than not he sees his pulmonologist, Dr. Christiano Cochran, on an inpatient basis.     ESRD, HTN, anemia of CKD  -  Currently patient seems to be euvolemic.  -  Nephrology assistance requested for HD if patient remains hospitalized more than one day.     CAD s/p PCI,  HFrEF, AS s/p TAVR  -  Chronically elevated troponin.  Recent NSTEMI s/p PATRICIA to mid RCA.  No e/o acute stent thrombosis.  Will trend out current troponin.   -  Per last (limited) TTE 1/12/22 LVEF = 40-45%.  Per last complete TTE 9/7/21 aortic valve prosthesis was functioning normally. -  Continue aspirin, plavix, statin, metoprolol XL, lisinopril. -  Consider addition of a long acting nitrate.     DM2 w/ hyperglycemia  -  Hold all oral antidiabetic agents.  Start s.c. Insulin regimen based on home regimen.       DVT Prophylaxis: SQ Heparin/IPC    Recent Labs     02/01/22  0245 02/02/22  0506 02/03/22  0656    261 237     Diet: ADULT DIET; Regular  Code Status: Full Code      PT/OT Eval Status: not yet ordered.      Dispo - Placed in OBServation. Patient is likely to remain in-house at least until Thurs/Friday 3/4 Feb pending clinical course and subspecialty recs.      Kerline Ellsworth MD

## 2022-02-03 NOTE — TELEPHONE ENCOUNTER
Patient is currently inpatient status, medication filled 1/10/2022, he should be good for another few days when he gets home.

## 2022-02-03 NOTE — PROGRESS NOTES
[facility] Progress Note           Subjective/   46y.o. year old male who we are seeing in consultation for ESKD on HD. HPI:  On 1-2lpm  No new complaints  HD on 2/2 with 3 liters removed, post-weight of 116.4 kg. ROS:  Intake adequate, +weak. Objective/   GEN:  Chronically ill, /68   Pulse 82   Temp 98.3 °F (36.8 °C) (Oral)   Resp 18   Ht 5' 9\" (1.753 m)   Wt 256 lb 9.9 oz (116.4 kg)   SpO2 95%   BMI 37.90 kg/m²   HEENT: non-icteric, no JVD  CV: S1, S2 without m/r/g; + LE edema  RESP: CTA B without w/r/r; breathing wnl  ABD: +bs, soft, nt, no hsm  SKIN: warm, no rashes  ACCESS: Left IJ [facility] [place]    Data/  Recent Labs     02/01/22 0245 02/02/22  0506 02/03/22  0656   WBC 9.1 8.2 7.6   HGB 8.9* 9.1* 8.6*   HCT 26.6* 27.9* 25.8*   MCV 88.4 89.3 89.3    261 237     Recent Labs     02/01/22 0245 02/02/22  0506 02/03/22  0656    133* 133*   K 4.2 4.9 4.5   CL 97* 95* 95*   CO2 23 24 22   GLUCOSE 399* 68* 193*   MG  --  1.90 2.00   BUN 63* 71* 50*   CREATININE 7.0* 8.1* 6.5*   LABGLOM 8* 7* 9*   GFRAA 10* 8* 11*       Assessment/     - End stage kidney disease - on dialysis MWF     - Troponin elevation with hx of CAD s/p PCI     - s/p TAVR     - Hypertension     - ZAINAB - on CPAP     - DM2 - uncontrolled     - Anemia of chronic disease - Hb ~9 range    Plan/     - HD per schedule next on 2/4, listed EDW of 116 kg    had been leaving closer to ~119 kg, reached TW on 2/2  - DAVON dosing with HD - increased from [address]K to [address]  labs, bp's     ____________________________________  Denys Reeves MD  [facility]  [email or web address]  Office: [phone]

## 2022-02-03 NOTE — TELEPHONE ENCOUNTER
----- Message from Feliciano Mchugh sent at 2/3/2022 11:18 AM EST -----  Subject: Refill Request    QUESTIONS  Name of Medication? oxyCODONE-acetaminophen (PERCOCET) 7.5-325 MG per   tablet  Patient-reported dosage and instructions? 1 pill every 4 hours  How many days do you have left? 1  Preferred [facility]acy? [id number]  Pharmacy phone number (if available)? [phone]  Additional Information for Provider? Pt notes he only has one pill left   and is in urgent need of a refill.  ---------------------------------------------------------------------------  --------------  CALL BACK INFO  What is the best way for the office to contact you? OK to leave message on   voicemail  Preferred Call Back Phone Number?  [phone]

## 2022-02-03 NOTE — CARE COORDINATION
CASE MANAGEMENT DISCHARGE SUMMARY      Discharge to: home w/no needs  IMM given: (date) obs  Transportation: private  Confirmed discharge plan with:     Patient: yes     Family:  yes   Name: Flower Smith number: [phone]        RN, name: Moe Genao RN    Note: CM confirmed w/Pulm that pt will need to set up an OP sleep study and that pt does not qualify for home O2 at this time. Pt made aware. Spouse made aware of likely d/c today, per MD rounding. Pt has no DCP needs.   Leland Moran, RN

## 2022-02-03 NOTE — PROGRESS NOTES
INPATIENT PULMONARY CRITICAL CARE PROGRESS NOTE      Reason for visit      multifactorial dyspnea with suspected intrathoracic and intrathoracic airflow obstruction    SUBJECTIVE: Patient when seen this morning was comfortably sleeping in the bed on BiPAP without any respiratory distress, patient was on room air on the BiPAP and patient was evaluated for overnight pulse oximetry to see for any nocturnal hypoxemia,, patient was afebrile and hemodynamically maintained, , patient was in sinus rhythm on the monitor, patient's glycemic control was suboptimally controlled, patient underwent hemodialysis yesterday, patient's limited fluid balance was - 730 mm, patient was alert and oriented, no other pertinent review of system of concern             Physical Exam:  Blood pressure (!) 152/68, pulse 82, temperature 98.2 °F (36.8 °C), temperature source Oral, resp. rate 18, height 5' 9\" (1.753 m), weight 256 lb 9.9 oz (116.4 kg), SpO2 100 %.'     Constitutional:  No acute distress. on BIPAP    HENT: BIPAP mask intact . No thyromegaly. Eyes:  Conjunctivae are normal. Pupils equal, round, and reactive to light. No scleral icterus. Neck: . No tracheal deviation present. No obvious thyroid mass.  Short enlarged neck  Cardiovascular: Normal rate, regular rhythm, normal heart sounds.  No right ventricular heave some lower extremity edema. Pulmonary/Chest: No wheezes.  no significant  rales.  Chest wall is not dull to percussion.  No accessory muscle usage or stridor. Decreased breath sound density, HD catheter present  Abdominal: Soft. Bowel sounds present. No distension or hernia. No tenderness. Obese  Musculoskeletal: No cyanosis. No clubbing. No obvious joint deformity. Lymphadenopathy: No cervical or supraclavicular adenopathy. Skin: Skin is warm and dry. No rash or nodules on the exposed extremities. ,  Tattoos present  Neurologic:  sleeping when seen           Results:  CBC:   Recent Labs     02/01/22  0245 02/02/22  0506 02/03/22  0656   WBC 9.1 8.2 7.6   HGB 8.9* 9.1* 8.6*   HCT 26.6* 27.9* 25.8*   MCV 88.4 89.3 89.3    261 237     BMP:   Recent Labs     02/01/22  0245 02/02/22  0506 02/03/22  0656    133* 133*   K 4.2 4.9 4.5   CL 97* 95* 95*   CO2 23 24 22   BUN 63* 71* 50*   CREATININE 7.0* 8.1* 6.5*     LIVER PROFILE:   Recent Labs     02/01/22 0245   AST 8*   ALT 9*   BILITOT <0.2   ALKPHOS 131*     PT/INR:   Recent Labs     02/01/22 0245   PROTIME 11.5   INR 1.02       Imaging:  I have reviewed radiology images personally. CT CHEST PULMONARY EMBOLISM W CONTRAST   Final Result   Minimal vascular congestion without edema. No identified pulmonary embolism. Interval development of a 4-5 mm noncalcified left lower lobe nodule. This   could be followed annually as indicated per Fleischner society criteria. Interval development of minimally prominent lymph nodes. These are favored   to be reactive but follow-up in 6 months recommended to ensure stability,   sooner if indicated clinically. RECOMMENDATIONS:   Unavailable         XR CHEST PORTABLE   Final Result   Mild interstitial prominence which likely represents mild vascular congestion   or interstitial edema. Atypical or viral infection less likely.   Short-term   follow-up recommended if symptoms persist.         POC US [facility]   Final Result        Does not show patient to have nocturnal hypoxemia on room air on BiPAP and patient does not need any supplemental oxygen at nighttime      Assessment:  Principal Problem:    CAD S/P percutaneous coronary angioplasty  Active Problems:    Type 2 diabetes, uncontrolled, with neuropathy (HCC)    Ischemic cardiomyopathy    Asthma-COPD overlap syndrome (HCC)    S/P TAVR (transcatheter aortic valve replacement)    Obesity (BMI 30-39.9)    ZAINAB on CPAP    Dyspnea    ESRD (end stage renal disease) on dialysis [facility])  Resolved Problems:    * No resolved hospital problems. *          Plan:     · Oxygen supplementation,if required,  to keep saturation being 90-94% only  · Please titrate down the oxygen as per the above parameters  · Patient does not have any nocturnal hypoxemia on the overnight pulse oximetry which was done which was discussed with case management also  · Patient to continue with home CPAP for now  · Patient is to have a repeat sleep study as an outpatient to see if patient can be fit for any BiPAP  · Pulmonary toilet  · Patient's x-ray chest shows patient to have mild pulmonary venous congestion and patient CT of the chest did not show any pneumonic process but has a lung nodule which is new along with reactive adenopathy  · Patient does not need any antibiotics from pulmonary standpoint of view  · HD as per nephrology  · Patient's PFT flow-volume loop in 2016 did not show any significant intrathoracic or extrathoracic or fixed obstruction at that time  · Patient will benefit from a repeat PFT as an outpatient  · Antihypertensives as per IM/nephrology   · Bronchodilators  · We will hold off on any systemic steroids at this time  · Lantus insulin as per blood glucose monitoring as per IM  · Blood glucose monitoring with sliding scale insulin  · Trend hemodynamics and cardiac rhythm closely  · Diet and life style modifications  · Patient needs to lose weight   · PUD and DVT prophylaxis     Case d/w patient and nursing   Case discussed with case management    ? Discharge planning     Follow-up with Dr. Calhoun Romberg in the office in 3 to 4 weeks time to do the needful          Electronically signed by:  Mell Stanley MD    2/3/2022    10:39 AM.

## 2022-02-03 NOTE — PROGRESS NOTES
02/02/22 2235   NIV Type   Skin Assessment Clean, dry, & intact   Skin Protection for O2 Device Yes   Location Nose   Equipment Type v60   Mode Bilevel   Mask Type Full face mask   Mask Size Large   Settings/Measurements   IPAP 16 cmH20   CPAP/EPAP 6 cmH2O   Rate Ordered 12   Resp 15   FiO2  21 %   Vt Exhaled 1220 mL   Minute Volume 15.4 Liters   Mask Leak (lpm) 0 lpm   Comfort Level Good   Using Accessory Muscles No   SpO2 95   Breath Sounds   Right Upper Lobe Diminished   Right Middle Lobe Diminished   Right Lower Lobe Diminished   Left Upper Lobe Diminished   Left Lower Lobe Diminished   Alarm Settings   Alarms On Y   Press Low Alarm 6 cmH2O   High Pressure Alarm 30 cmH2O   Apnea (secs) 20 secs   Resp Rate Low Alarm 6   High Respiratory Rate 40 br/min

## 2022-02-03 NOTE — PROGRESS NOTES
02/03/22 0446   NIV Type   NIV Started/Stopped On   Equipment Type V60   Mode Bilevel   Mask Type Full face mask   Mask Size Large   Settings/Measurements   IPAP 16 cmH20   CPAP/EPAP 6 cmH2O   Rate Ordered 12   Resp 24   FiO2  21 %   Vt Exhaled 1014 mL   Minute Volume 22.5 Liters   Mask Leak (lpm) 16 lpm   Comfort Level Good   Using Accessory Muscles No   SpO2 94   Breath Sounds   Right Upper Lobe Diminished   Right Middle Lobe Diminished   Right Lower Lobe Diminished   Left Upper Lobe Diminished   Left Lower Lobe Diminished   Alarm Settings   Alarms On Y

## 2022-02-04 VITALS
WEIGHT: 262.35 LBS | OXYGEN SATURATION: 100 % | HEART RATE: 82 BPM | SYSTOLIC BLOOD PRESSURE: 148 MMHG | TEMPERATURE: 97.4 F | HEIGHT: 69 IN | DIASTOLIC BLOOD PRESSURE: 88 MMHG | RESPIRATION RATE: 18 BRPM | BODY MASS INDEX: 38.86 KG/M2

## 2022-02-04 LAB
ALBUMIN SERPL-MCNC: 3.4 G/DL (ref 3.4–5)
ANION GAP SERPL CALCULATED.3IONS-SCNC: 14 MMOL/L (ref 3–16)
BASOPHILS ABSOLUTE: 0.1 K/UL (ref 0–0.2)
BASOPHILS RELATIVE PERCENT: 1.1 %
BUN BLDV-MCNC: 50 MG/DL (ref 7–20)
CALCIUM SERPL-MCNC: 8.1 MG/DL (ref 8.3–10.6)
CHLORIDE BLD-SCNC: 91 MMOL/L (ref 99–110)
CO2: 22 MMOL/L (ref 21–32)
CREAT SERPL-MCNC: 6.6 MG/DL (ref 0.9–1.3)
EOSINOPHILS ABSOLUTE: 0.3 K/UL (ref 0–0.6)
EOSINOPHILS RELATIVE PERCENT: 5.4 %
GFR AFRICAN AMERICAN: 11
GFR NON-AFRICAN AMERICAN: 9
GLUCOSE BLD-MCNC: 148 MG/DL (ref 70–99)
GLUCOSE BLD-MCNC: 158 MG/DL (ref 70–99)
GLUCOSE BLD-MCNC: 160 MG/DL (ref 70–99)
GLUCOSE BLD-MCNC: 162 MG/DL (ref 70–99)
HCT VFR BLD CALC: 26.8 % (ref 40.5–52.5)
HEMOGLOBIN: 8.9 G/DL (ref 13.5–17.5)
LYMPHOCYTES ABSOLUTE: 1.2 K/UL (ref 1–5.1)
LYMPHOCYTES RELATIVE PERCENT: 20 %
MAGNESIUM: 1.8 MG/DL (ref 1.8–2.4)
MCH RBC QN AUTO: 29.5 PG (ref 26–34)
MCHC RBC AUTO-ENTMCNC: 33.2 G/DL (ref 31–36)
MCV RBC AUTO: 89 FL (ref 80–100)
MONOCYTES ABSOLUTE: 0.2 K/UL (ref 0–1.3)
MONOCYTES RELATIVE PERCENT: 3.7 %
NEUTROPHILS ABSOLUTE: 4 K/UL (ref 1.7–7.7)
NEUTROPHILS RELATIVE PERCENT: 69.8 %
PDW BLD-RTO: 15.9 % (ref 12.4–15.4)
PERFORMED ON: ABNORMAL
PHOSPHORUS: 5.1 MG/DL (ref 2.5–4.9)
PLATELET # BLD: 235 K/UL (ref 135–450)
PMV BLD AUTO: 7.9 FL (ref 5–10.5)
POTASSIUM SERPL-SCNC: 4.7 MMOL/L (ref 3.5–5.1)
RBC # BLD: 3.01 M/UL (ref 4.2–5.9)
SODIUM BLD-SCNC: 127 MMOL/L (ref 136–145)
WBC # BLD: 5.8 K/UL (ref 4–11)

## 2022-02-04 PROCEDURE — 2500000003 HC RX 250 WO HCPCS: Performed by: INTERNAL MEDICINE

## 2022-02-04 PROCEDURE — 80069 RENAL FUNCTION PANEL: CPT

## 2022-02-04 PROCEDURE — G0378 HOSPITAL OBSERVATION PER HR: HCPCS

## 2022-02-04 PROCEDURE — 2700000000 HC OXYGEN THERAPY PER DAY

## 2022-02-04 PROCEDURE — 6360000002 HC RX W HCPCS: Performed by: INTERNAL MEDICINE

## 2022-02-04 PROCEDURE — 36415 COLL VENOUS BLD VENIPUNCTURE: CPT

## 2022-02-04 PROCEDURE — 85025 COMPLETE CBC W/AUTO DIFF WBC: CPT

## 2022-02-04 PROCEDURE — 83735 ASSAY OF MAGNESIUM: CPT

## 2022-02-04 PROCEDURE — 94660 CPAP INITIATION&MGMT: CPT

## 2022-02-04 PROCEDURE — 90935 HEMODIALYSIS ONE EVALUATION: CPT

## 2022-02-04 PROCEDURE — 96372 THER/PROPH/DIAG INJ SC/IM: CPT

## 2022-02-04 PROCEDURE — 6360000002 HC RX W HCPCS

## 2022-02-04 PROCEDURE — 6370000000 HC RX 637 (ALT 250 FOR IP): Performed by: INTERNAL MEDICINE

## 2022-02-04 PROCEDURE — 94761 N-INVAS EAR/PLS OXIMETRY MLT: CPT

## 2022-02-04 PROCEDURE — 96375 TX/PRO/DX INJ NEW DRUG ADDON: CPT

## 2022-02-04 RX ORDER — ONDANSETRON 2 MG/ML
INJECTION INTRAMUSCULAR; INTRAVENOUS
Status: DISCONTINUED
Start: 2022-02-04 | End: 2022-02-04 | Stop reason: HOSPADM

## 2022-02-04 RX ORDER — MIDODRINE HYDROCHLORIDE 5 MG/1
TABLET ORAL
Status: DISCONTINUED
Start: 2022-02-04 | End: 2022-02-04 | Stop reason: HOSPADM

## 2022-02-04 RX ORDER — MIDODRINE HYDROCHLORIDE 5 MG/1
10 TABLET ORAL PRN
Status: DISCONTINUED | OUTPATIENT
Start: 2022-02-04 | End: 2022-02-04 | Stop reason: HOSPADM

## 2022-02-04 RX ORDER — HEPARIN SODIUM 1000 [USP'U]/ML
INJECTION, SOLUTION INTRAVENOUS; SUBCUTANEOUS
Status: DISCONTINUED
Start: 2022-02-04 | End: 2022-02-04 | Stop reason: HOSPADM

## 2022-02-04 RX ADMIN — PANTOPRAZOLE SODIUM 40 MG: 40 TABLET, DELAYED RELEASE ORAL at 06:13

## 2022-02-04 RX ADMIN — HEPARIN SODIUM 4700 UNITS: 1000 INJECTION INTRAVENOUS; SUBCUTANEOUS at 11:15

## 2022-02-04 RX ADMIN — HEPARIN SODIUM 5000 UNITS: 5000 INJECTION INTRAVENOUS; SUBCUTANEOUS at 14:45

## 2022-02-04 RX ADMIN — MIDODRINE HYDROCHLORIDE 10 MG: 5 TABLET ORAL at 10:04

## 2022-02-04 RX ADMIN — HEPARIN SODIUM 5000 UNITS: 5000 INJECTION INTRAVENOUS; SUBCUTANEOUS at 06:13

## 2022-02-04 RX ADMIN — EPOETIN ALFA-EPBX 3000 UNITS: 3000 INJECTION, SOLUTION INTRAVENOUS; SUBCUTANEOUS at 11:15

## 2022-02-04 RX ADMIN — EPOETIN ALFA-EPBX 2000 UNITS: 2000 INJECTION, SOLUTION INTRAVENOUS; SUBCUTANEOUS at 11:15

## 2022-02-04 RX ADMIN — CALCIUM ACETATE 667 MG: 667 CAPSULE ORAL at 12:11

## 2022-02-04 RX ADMIN — INSULIN LISPRO 2 UNITS: 100 INJECTION, SOLUTION INTRAVENOUS; SUBCUTANEOUS at 12:42

## 2022-02-04 RX ADMIN — OXYCODONE AND ACETAMINOPHEN 1 TABLET: 7.5; 325 TABLET ORAL at 06:13

## 2022-02-04 RX ADMIN — OXYCODONE AND ACETAMINOPHEN 1 TABLET: 7.5; 325 TABLET ORAL at 12:16

## 2022-02-04 RX ADMIN — INSULIN LISPRO 8 UNITS: 100 INJECTION, SOLUTION INTRAVENOUS; SUBCUTANEOUS at 12:42

## 2022-02-04 RX ADMIN — GABAPENTIN 100 MG: 100 CAPSULE ORAL at 14:45

## 2022-02-04 RX ADMIN — ONDANSETRON 4 MG: 2 INJECTION INTRAMUSCULAR; INTRAVENOUS at 10:04

## 2022-02-04 ASSESSMENT — PAIN SCALES - GENERAL
PAINLEVEL_OUTOF10: 3
PAINLEVEL_OUTOF10: 7
PAINLEVEL_OUTOF10: 3
PAINLEVEL_OUTOF10: 0
PAINLEVEL_OUTOF10: 9
PAINLEVEL_OUTOF10: 0
PAINLEVEL_OUTOF10: 0

## 2022-02-04 ASSESSMENT — PAIN DESCRIPTION - ORIENTATION: ORIENTATION: LOWER

## 2022-02-04 ASSESSMENT — PAIN DESCRIPTION - PAIN TYPE: TYPE: CHRONIC PAIN

## 2022-02-04 ASSESSMENT — PAIN DESCRIPTION - FREQUENCY: FREQUENCY: INTERMITTENT

## 2022-02-04 ASSESSMENT — PAIN DESCRIPTION - LOCATION: LOCATION: BACK

## 2022-02-04 NOTE — FLOWSHEET NOTE
02/02/22 0953 02/02/22 1253   Vital Signs   BP (!) 154/85 124/65   Temp 97.8 °F (36.6 °C) 98 °F (36.7 °C)   Pulse 75 78   Resp 20 20   Weight 261 lb 7.5 oz (118.6 kg) 256 lb 9.9 oz (116.4 kg)   Weight Method Standing scale Standing scale       Treatment time: 3h  Net UF:3 L    Pre weight: 118.6 kg  Post weight: 116.4 kg  EDW: 116 kg    Access used: [address]  Access function: Good    Medications or blood products given: Retacrit 2000 Units    Regular outpatient schedule: Wu HERNÁNDEZ    Summary of response to treatment: Good    Copy of dialysis treatment record placed in chart, to be scanned into EMR.
Treatment time: 3 hours  Net UF: 1800 ml    Pre weight: 120.8 kg   Post weight: 119 kg  EDW: TBD kg ( outpt HD clinic DW; 116 KG)    Access used: Left chest wall TDC  Access function: Ok, positional with -400 ml/min    Medications or blood products given: Zofran, Midodrine, Retacrit    Regular outpatient schedule: M.W.F    Summary of response to treatment: Pt tolerated ok with HD, c/o light headed and nausea during HD, Zofran and Midodrine given once. HD completed in full, heparin dwell in TDC, capped and clamped. Cirt Line: Initial Hct: 25.3;   End Profile : A; Refill ( Hct.1 -28.2  subtract Hct 28.1): 0.1 (no Refill); BV: -10.1 %      Copy of dialysis treatment record placed in chart, to be scanned into EMR.     02/04/22 0810 02/04/22 1117   Vital Signs   /74 (!) 146/64   Temp 97.8 °F (36.6 °C) 97.7 °F (36.5 °C)   Pulse 69 74   Resp 20 16   SpO2 100 % 100 %   Weight 266 lb 5.1 oz (120.8 kg) 262 lb 5.6 oz (119 kg)   Weight Method  --  Actual;Bed scale   Percent Weight Change -0.03 -1.52   Dry Weight 255 lb 11.7 oz (116 kg) 262 lb 5.6 oz (119 kg)   Post-Hemodialysis Assessment   Post-Treatment Procedures  --  Blood returned;Catheter capped, clamped and heparinized x 2 ports   Machine Disinfection Process  --  Acid/Vinegar Clean;Heat Disinfect; Exterior Machine Disinfection   Rinseback Volume (ml)  --  400 ml   Total Liters Processed (l/min)  --  63.4 l/min   Dialyzer Clearance  --  Moderately streaked   Duration of Treatment (minutes)  --  180 minutes   Heparin amount administered during treatment (units)  --  0 units   Hemodialysis Intake (ml)  --  600 ml   Hemodialysis Output (ml)  --  2400 ml   NET Removed (ml)  --  1800 ml   Tolerated Treatment  --  Fair
Detail Level: Detailed
Note Text (......Xxx Chief Complaint.): This diagnosis correlates with the
Other (Free Text): well controlled\\nNeeds Oracea refill \\nnow using AtoZ
Other (Free Text): [place]’s has cleared up\\nOccasionally flares up, but not as often or not as bad\\nTAC helps\\n

## 2022-02-04 NOTE — PLAN OF CARE
Problem: Falls - Risk of:  Goal: Will remain free from falls  Description: Will remain free from falls  Outcome: Ongoing     Problem: Serum Glucose Level - Abnormal:  Goal: Ability to maintain appropriate glucose levels will improve  Description: Ability to maintain appropriate glucose levels will improve  Outcome: Ongoing       Problem: Pain:  Goal: Pain level will decrease  Description: Pain level will decrease  Outcome: Ongoing

## 2022-02-04 NOTE — PROGRESS NOTES
02/03/22 2114   NIV Type   NIV Started/Stopped On   Equipment Type v60   Mode Bilevel   Mask Type Full face mask   Mask Size Large   Settings/Measurements   IPAP 16 cmH20   CPAP/EPAP 6 cmH2O   Rate Ordered 12   Resp 18   FiO2  21 %   Vt Exhaled 619 mL   Minute Volume 18.4 Liters   Mask Leak (lpm) 0 lpm   Comfort Level Good   Using Accessory Muscles No   SpO2 95   Breath Sounds   Right Upper Lobe Diminished   Right Middle Lobe Diminished   Right Lower Lobe Diminished   Left Upper Lobe Diminished   Left Lower Lobe Diminished   Alarm Settings   Alarms On Y   Press Low Alarm 6 cmH2O   High Pressure Alarm 30 cmH2O   Apnea (secs) 20 secs   Resp Rate Low Alarm 6   High Respiratory Rate 40 br/min

## 2022-02-04 NOTE — CARE COORDINATION
CM aware of dc order. CM met with pt at bedside. Pt is aware he has been discharged. Pt stated his wife will not drive in the snow. He stated he called both or his daughters as well. Pt states he does not have a ride home. Cm explained cabs are not running at this time. Cm explained could attempt to send pt home in a WC transport but pt would be responsible for the cost. Pt stated he could not pay for transport. 3:59 PM  CM had arranged transport via [employer] transport. When CM went to room to tell pt, pt had already discharged home via [employer]. CM cancelled transport with [employer].

## 2022-02-04 NOTE — PROGRESS NOTES
PIV x1 removed. Discharge paperwork went over with, with pt. Pt verbalized understanding. RN manager facilitating ride planning for pt.

## 2022-02-04 NOTE — PROGRESS NOTES
Patient transfer to Dialysis at this time. Vitals obtained prior to transfer. Will complete shift assessment and administer morning medications once pt arrives back on unit.

## 2022-02-04 NOTE — PROGRESS NOTES
Hospitalist Progress Note      PCP: Anna Camargo MD    Date of Admission: 2/1/2022    Chief Complaint: SOB    Subjective: No new c/o.   Seen in HD      Medications:  Reviewed    Infusion Medications    dextrose      sodium chloride       Scheduled Medications    ondansetron        midodrine        heparin (porcine)        epoetin siddharth-epbx  5,000 Units IntraVENous Q MWF    aspirin  81 mg Oral Daily    calcium acetate  1 capsule Oral TID WC    clopidogrel  75 mg Oral Daily    DULoxetine  60 mg Oral Daily    fluticasone  2 spray Each Nostril Daily    gabapentin  100 mg Oral TID    linaclotide  145 mcg Oral QAM AC    lisinopril  5 mg Oral Daily    metoprolol succinate  50 mg Oral Daily    pantoprazole  40 mg Oral QAM AC    pravastatin  40 mg Oral Nightly    QUEtiapine  50 mg Oral Nightly    tiotropium-olodaterol  2 puff Inhalation Daily    traZODone  50 mg Oral Nightly    heparin (porcine)  5,000 Units SubCUTAneous 3 times per day    insulin glargine  30 Units SubCUTAneous Nightly    insulin lispro  8 Units SubCUTAneous TID WC    insulin lispro  0-12 Units SubCUTAneous TID WC    insulin lispro  0-6 Units SubCUTAneous Nightly     PRN Meds: midodrine, heparin (porcine), oxyCODONE-acetaminophen, glucose, glucagon (rDNA), dextrose, sodium chloride flush, sodium chloride, ondansetron **OR** ondansetron, acetaminophen **OR** acetaminophen, polyethylene glycol, calcium carbonate, albuterol, dextrose bolus (hypoglycemia) **OR** dextrose bolus (hypoglycemia)      Intake/Output Summary (Last 24 hours) at 2/4/2022 1305  Last data filed at 2/4/2022 1213  Gross per 24 hour   Intake 960 ml   Output 2400 ml   Net -1440 ml       Physical Exam Performed:    BP (!) 148/88   Pulse 82   Temp 97.4 °F (36.3 °C) (Axillary)   Resp 18   Ht 5' 9\" (1.753 m)   Wt 262 lb 5.6 oz (119 kg)   SpO2 100% Comment: pt requesting oxygen on   BMI 38.74 kg/m²     General appearance: No apparent distress, appears stated age and cooperative. HEENT: Pupils equal, round, and reactive to light. Conjunctivae/corneas clear. Neck: Supple, with full range of motion. No jugular venous distention. Trachea midline. Respiratory:  Normal respiratory effort. Clear to auscultation, bilaterally without Rales/Wheezes/Rhonchi. Cardiovascular: Regular rate and rhythm with normal S1/S2 without murmurs, rubs or gallops. Abdomen: Soft, non-tender, non-distended with normal bowel sounds. Musculoskeletal: No clubbing, cyanosis or edema bilaterally. Full range of motion without deformity. Skin: Skin color, texture, turgor normal.  No rashes or lesions. Neurologic:  Neurovascularly intact without any focal sensory/motor deficits. Cranial nerves: II-XII intact, grossly non-focal.  Psychiatric: Alert and oriented, thought content appropriate, normal insight  Capillary Refill: Brisk,< 3 seconds   Peripheral Pulses: +2 palpable, equal bilaterally       Labs:   Recent Labs     02/02/22  0506 02/03/22  0656 02/04/22  0814   WBC 8.2 7.6 5.8   HGB 9.1* 8.6* 8.9*   HCT 27.9* 25.8* 26.8*    237 235     Recent Labs     02/02/22  0506 02/03/22  0656 02/04/22  0814   * 133* 127*   K 4.9 4.5 4.7   CL 95* 95* 91*   CO2 24 22 22   BUN 71* 50* 50*   CREATININE 8.1* 6.5* 6.6*   CALCIUM 8.6 8.0* 8.1*   PHOS  --   --  5.1*     No results for input(s): AST, ALT, BILIDIR, BILITOT, ALKPHOS in the last 72 hours. No results for input(s): INR in the last 72 hours.   Recent Labs     02/01/22  1452   TROPONINI 0.19*       Urinalysis:      Lab Results   Component Value Date    NITRU Negative 09/07/2021    WBCUA 10-20 09/07/2021    BACTERIA 1+ 09/07/2021    RBCUA 3-4 09/07/2021    BLOODU TRACE-LYSED 09/07/2021    SPECGRAV 1.015 09/07/2021    GLUCOSEU 100 09/07/2021       Consults:    IP CONSULT TO PULMONOLOGY  IP CONSULT TO NEPHROLOGY  IP CONSULT TO HEART FAILURE NURSE/COORDINATOR  IP CONSULT TO DIETITIAN      Assessment/Plan:    Active Hospital Problems Diagnosis     Ischemic cardiomyopathy [I25.5]      Priority: High    Type 2 diabetes, uncontrolled, with neuropathy (HCC) [E11.40, E11.65]      Priority: High    CAD S/P percutaneous coronary angioplasty [I25.10, Z98.61]      Priority: High    S/P TAVR (transcatheter aortic valve replacement) [Z95.2]      Priority: Medium    Asthma-COPD overlap syndrome (HCC) [J44.9]      Priority: Medium    ESRD (end stage renal disease) on dialysis ([facility] [address] [N18.6, Z99.2]     Dyspnea [R06.00]     ZAINAB on CPAP [G47.33, Z99.89]     Obesity (BMI 30-39. 9) [E66.9]      Dyspnea, Severe Asthma-COPD, ZAINAB on CP  -  Multifactorial dyspnea.  Primary issue at this time seems to be airflow obstruction, both intrathoracic and extrathoracic based on exam.  His upper airway seems crowded and he may have vocal cord dysfunction  - Seems likely to need home O2 particularly while lying supine to sleep.  Will start a home O2 evaluation. [facility] would likely benefit from O2 bled into his home CPAP circuit.  He also needs an updated PSG and CPAP/BIPAP titration.  His last complete study was in 2015. [facility] had a home study in 2019 documenting significant hypoxemia and this occurred even while using CPA  -  Will trial nocturnal and nap BiPAP w/ empiric settings 16 / 10.  Home CPAP setting is 8 - 12 cm H2O. -  Continue home Stiolto plus as-needed short-acting bronchodilators. -  Pulmonary input will be requested. [facility] has not been seen in outpatient pulmonary clinic for over a year, primarily because he is hospitalized so often.  More often than not he sees his pulmonologist, Dr. Wing Ceballos, on an inpatient basis. ESRD - on HD M/W/F. Nephrology consulted and appreciated    HTN/CAD - w/ known CAD s/p NSTEMI s/p PATRICIA to mid RCA. No evidence of active signs/sxs of ischemia/failure. Currently controlled on home meds w/ vitals reviewed and documented as above. HyperLipidemia - controlled on home Statin.  Continue, w/ f/u and med adjustment w/ PCP    Anemia -likely 2nd to ESRD w/out active bleeding/hemolysis. Asymptomatic w/out indication for transfusion. Follow serial labs. Reviewed and documented as above. Aortic Stenosis - s/p TAVR    CHF - chronic systolic failure w/ reduced EF 40-45% by Echo dated Jan 2022. Likely due to ischemic heart disease. No evidence of acute decompensation. Continue current medical mgt. DM2 - controlled on home Insulin - continued. Follow FSBS/SSI medium regimen. Last HbA1c 9.4% dated Jan 2022. Anticipate resuming/continuing home regimen at discharge. Obesity -  With Body mass index is 38.74 kg/m². Complicating assessment and treatment. Placing patient at risk for multiple co-morbidities as well as early death and contributing to the patient's presentation. Counseled on weight loss. DVT Prophylaxis: SQ Heparin/IPC    Recent Labs     02/02/22  0506 02/03/22  0656 02/04/22  0814    237 235     Diet: ADULT DIET; Regular  Code Status: Full Code      PT/OT Eval Status: not yet ordered.      Dispo - Placed in OBServation. Patient was medically stable for discharge Thurs 3 Feb held for inclement weather reasons.   Remains medically stable for discharge Friday 4 Feb    Shraddha Stenr MD

## 2022-02-04 NOTE — PROGRESS NOTES
02/04/22 0026   NIV Type   $NIV $Daily Charge   NIV Started/Stopped On   Equipment Type V60   Mode Bilevel   Mask Type Full face mask   Mask Size Large   Settings/Measurements   IPAP 16 cmH20   CPAP/EPAP 6 cmH2O   Rate Ordered 12   Resp 18   FiO2  21 %   Vt Exhaled 578 mL   Minute Volume 17.5 Liters   Mask Leak (lpm) 23 lpm   Comfort Level Good   Using Accessory Muscles No   SpO2 96   Alarm Settings   Alarms On Y

## 2022-02-04 NOTE — PROGRESS NOTES
Pt wheeled via wheelchair to [facility] with all belongings. Pt discharge home in stable condition.

## 2022-02-04 NOTE — PROGRESS NOTES
[facility] Progress Note           Subjective/   46y.o. year old male who we are seeing in consultation for ESKD on HD. HPI:  Seen and examined on HD on 23/9, complicated by hypotension and nausea along with vomiting. UF goal adjusted  Patient received midodrine 10 mg with dialysis  HD on 2/2 with 3 liters removed, post-weight of 116.4 kg. ROS:  Intake adequate, +weak. Objective/   GEN:  Chronically ill, /74   Pulse 69   Temp 97.8 °F (36.6 °C)   Resp 20   Ht 5' 9\" (1.753 m)   Wt 266 lb 6.4 oz (120.8 kg)   SpO2 100%   BMI 39.34 kg/m²   HEENT: non-icteric, no JVD  CV: S1, S2 without m/r/g; + LE edema  RESP: CTA B without w/r/r; breathing wnl  ABD: +bs, soft, nt, no hsm  SKIN: warm, no rashes  ACCESS: Left IJ [facility] [place]    Data/  Recent Labs     02/02/22  0506 02/03/22  0656 02/04/22  0814   WBC 8.2 7.6 5.8   HGB 9.1* 8.6* 8.9*   HCT 27.9* 25.8* 26.8*   MCV 89.3 89.3 89.0    237 235     Recent Labs     02/02/22  0506 02/03/22  0656 02/04/22  0814   * 133* 127*   K 4.9 4.5 4.7   CL 95* 95* 91*   CO2 24 22 22   GLUCOSE 68* 193* 148*   PHOS  --   --  5.1*   MG 1.90 2.00 1.80   BUN 71* 50* 50*   CREATININE 8.1* 6.5* 6.6*   LABGLOM 7* 9* 9*   GFRAA 8* 11* 11*       Assessment/     - End stage kidney disease - on dialysis MWF     - Troponin elevation with hx of CAD s/p PCI     - s/p TAVR     - Hypertension     - ZAINAB - on CPAP     - DM2 - uncontrolled     - Anemia of chronic disease - Hb ~9 range    Plan/     - HD per schedule    Target weight will be the post weight from 2/4  - DAVON dosing with HD - increased from 1K to 5K   - Trend labs, bp's     ____________________________________  Yevgeniy Leung MD  [facility]  [email or web address]  Office: [phone]

## 2022-02-07 ENCOUNTER — TELEPHONE (OUTPATIENT)
Dept: FAMILY MEDICINE CLINIC | Age: 52
End: 2022-02-07

## 2022-02-07 ENCOUNTER — CARE COORDINATION (OUTPATIENT)
Dept: CASE MANAGEMENT | Age: 52
End: 2022-02-07

## 2022-02-07 ENCOUNTER — TELEPHONE (OUTPATIENT)
Dept: PULMONOLOGY | Age: 52
End: 2022-02-07

## 2022-02-07 ENCOUNTER — FOLLOWUP TELEPHONE ENCOUNTER (OUTPATIENT)
Dept: TELEMETRY | Age: 52
End: 2022-02-07

## 2022-02-07 DIAGNOSIS — G89.4 CHRONIC PAIN SYNDROME: ICD-10-CM

## 2022-02-07 RX ORDER — OXYCODONE AND ACETAMINOPHEN 7.5; 325 MG/1; MG/1
1 TABLET ORAL EVERY 6 HOURS PRN
Qty: 120 TABLET | Refills: 0 | Status: SHIPPED | OUTPATIENT
Start: 2022-02-07 | End: 2022-03-07 | Stop reason: SDUPTHER

## 2022-02-07 NOTE — CARE COORDINATION
Transitions of Care Call  Call within 2 business days of discharge: Yes    Patient: Lizette Young Patient : 1970   MRN: [id number]  Reason for Admission: CAD   Discharge Date: 22 RARS: Readmission Risk Score: 44.2 ( )      Last Discharge [facility]       Complaint Diagnosis Description Type Department Provider    22 Shortness of Breath Shortness of breath . .. ED to Hosp-Admission (Discharged) (ADMITTED) Yanni Mijares MD; Anne Sung... Was this an external facility discharge? No Discharge Facility: n/a    Challenges to be reviewed by the provider   Additional needs identified to be addressed with provider: No  none                 Encounter was not routed to provider for escalation. Method of communication with provider: none. Discussed COVID-19 related testing which was: available at this time. Test results were: negative. Patient informed of results, if available? No.    Current Symptoms: no new symptoms and no worsening symptoms    Reviewed New or Changed Meds: yes    Do you have what you need at home?  Durable Medical Equipment ordered at discharge: None   Home Health/Outpatient orders at discharge: none   Was patient discharged with a pulse oximeter? No Discussed and confirmed pulse oximeter discharge instructions and when to notify provider or seek emergency care. Patient education provided: Reviewed appropriate site of care based on symptoms and resources available to patient including: PCP, Specialist, Home health and When to call 911. Follow up appointment scheduled within 7 days of discharge: yes.  If no appointment scheduled, scheduling offered: yes  Future Appointments   Date Time Provider Nelly Darby   2/10/2022  2:30 PM JAY Sandoval - ILAN RAY   2022 11:15 AM MD SUSANA Hussein - DYBELKYS   3/31/2022  1:40 PM Robbin Pulido MD AND PULHOMERO RAY       Interventions: Obtained and reviewed discharge summary and/or continuity of care documents  Reviewed discharge instructions, medical action plan and red flags with patient who verbalized understanding. Plan for follow-up call in 5-7 days based on severity of symptoms and risk factors. Plan for next call: cardio apt/symptoms     Spoke with patient who reported he is still having sob but is stable. Patient informed of cardiology apt scheduled for 2/10 and CTN scheduled PCP follow up apt for patient as well for 2/22. CTN encouraged patient to reach out to the provider if sob worsens. CTN advised patient of use of urgent care or physicians 24 hr access line if assistance is needed after hours. Provided contact information for future needs.     Marci RALPH, RN, [facility]  Care Transition Nurse  [phone] mobile

## 2022-02-07 NOTE — TELEPHONE ENCOUNTER
Maria Luisa Storey wanted to inform Oscar Ludwig that patient went to the hospital for shortness of breath. He was discharged without oxygen but now he is at dialysis and back on oxygen.

## 2022-02-07 NOTE — TELEPHONE ENCOUNTER
Spoke to patient for hospital follow up. Pt at dialysis now. States he still feels short of breath. Weight is up today. Pt currently receiving dialysis and thinks he may feel better after. Will contact writer if no improvement.  Little Taylor RN

## 2022-02-07 NOTE — TELEPHONE ENCOUNTER
Pt scheduled on 3/31/22 with Dr. Narinder Danielle due to Dr. Abdi Whyte still being out on medical leave and not having openings in his schedule.

## 2022-02-07 NOTE — DISCHARGE SUMMARY
Hospital Medicine Discharge Summary    Patient ID: Trevor Freeman      Patient's PCP: Carli Palacio MD    Admit Date: 2/1/2022     Discharge Date: 2/4/2022      Admitting Physician: Alyson Sanchez MD     Discharge Physician: Arianna Allison MD     Discharge Diagnoses: Active Hospital Problems    Diagnosis     Ischemic cardiomyopathy [I25.5]      Priority: High    Type 2 diabetes, uncontrolled, with neuropathy ([facility] [address] [E11.40, E11.65]      Priority: High    CAD S/P percutaneous coronary angioplasty [I25.10, Z98.61]      Priority: High    S/P TAVR (transcatheter aortic valve replacement) [Z95.2]      Priority: Medium    Asthma-COPD overlap syndrome (HCC) [J44.9]      Priority: Medium    ESRD (end stage renal disease) on dialysis ([facility] [address] [N18.6, Z99.2]     Dyspnea [R06.00]     ZAINAB on CPAP [G47.33, Z99.89]     Obesity (BMI 30-39. 9) [E66.9]        The patient was seen and examined on day of discharge and this discharge summary is in conjunction with any daily progress note from day of discharge. Hospital Course:           Dyspnea, Severe Asthma-COPD, ZAINAB on CP  -  Multifactorial dyspnea.  Primary issue at this time seems to be airflow obstruction, both intrathoracic and extrathoracic based on exam.  His upper airway seems crowded and he may have vocal cord dysfunction  - Seems likely to need home O2 particularly while lying supine to sleep.  Will start a home O2 evaluation. [facility] would likely benefit from O2 bled into his home CPAP circuit.  He also needs an updated PSG and CPAP/BIPAP titration.  His last complete study was in 2015. [facility] had a home study in 2019 documenting significant hypoxemia and this occurred even while using CPA  -  Will trial nocturnal and nap BiPAP w/ empiric settings 16 / 10.  Home CPAP setting is 8 - 12 cm H2O. -  Continue home Stiolto plus as-needed short-acting bronchodilators.   -  Pulmonary input will be requested. [facility] has not been seen in outpatient pulmonary clinic for over a year, primarily because he is hospitalized so often.  More often than not he sees his pulmonologist, Dr. Alfred Phalen, on an inpatient basis.     ESRD - on HD M/W/F. Nephrology consulted and appreciated     HTN/CAD - w/ known CAD s/p NSTEMI s/p PATRICIA to mid RCA. No evidence of active signs/sxs of ischemia/failure. Currently controlled on home meds      HyperLipidemia - controlled on home Statin. Continue, w/ f/u and med adjustment w/ PCP     Anemia -likely 2nd to ESRD w/out active bleeding/hemolysis. Asymptomatic w/out indication for transfusion. Followed serial labs. .     Aortic Stenosis - s/p TAVR     CHF - chronic systolic failure w/ reduced EF 40-45% by Echo dated Jan 2022. Likely due to ischemic heart disease. No evidence of acute decompensation. Continue current medical mgt.      DM2 - controlled on home Insulin - continued. Follow FSBS/SSI medium regimen. Last HbA1c 9.4% dated Jan 2022. Resumed home regimen at discharge.      Obesity -  With Body mass index is 38.74 kg/m². Complicating assessment and treatment. Placing patient at risk for multiple co-morbidities as well as early death and contributing to the patient's presentation. Counseled on weight loss.        Labs: For convenience and continuity at follow-up the following most recent labs are provided:      CBC:    Lab Results   Component Value Date    WBC 5.8 02/04/2022    HGB 8.9 02/04/2022    HCT 26.8 02/04/2022     02/04/2022       Renal:    Lab Results   Component Value Date     02/04/2022    K 4.7 02/04/2022    K 4.2 02/01/2022    CL 91 02/04/2022    CO2 22 02/04/2022    BUN 50 02/04/2022    CREATININE 6.6 02/04/2022    CALCIUM 8.1 02/04/2022    PHOS 5.1 02/04/2022         Significant Diagnostic Studies    Radiology:   CT CHEST PULMONARY EMBOLISM W CONTRAST   Final Result   Minimal vascular congestion without edema. No identified pulmonary embolism.       Interval development of a 4-5 mm noncalcified left lower lobe nodule. This   could be followed annually as indicated per Fleischner society criteria. Interval development of minimally prominent lymph nodes. These are favored   to be reactive but follow-up in 6 months recommended to ensure stability,   sooner if indicated clinically. RECOMMENDATIONS:   Unavailable         XR CHEST PORTABLE   Final Result   Mild interstitial prominence which likely represents mild vascular congestion   or interstitial edema. Atypical or viral infection less likely. Short-term   follow-up recommended if symptoms persist.         POC [address]   Final Result             Consults:     IP CONSULT TO PULMONOLOGY  IP CONSULT TO NEPHROLOGY  IP CONSULT TO HEART FAILURE NURSE/COORDINATOR  IP CONSULT TO DIETITIAN    Disposition: Home    Condition at Discharge: Stable    Discharge Instructions/Follow-up:  w/ PCP 1-2 weeks and subspecialists as arranged.      Code Status:  Full Code    Activity: activity as tolerated    Diet: regular diet      Discharge Medications:     Discharge Medication List as of 2/4/2022  3:08 PM           Details   QUEtiapine (SEROQUEL) 50 MG tablet TAKE 1 TABLET BY MOUTH IN THE EVENING, Disp-30 tablet, R-2Normal      cyclobenzaprine (FLEXERIL) 10 MG tablet TAKE 1 TABLET BY MOUTH EVERY 8 HOURS AS NEEDED, Disp-90 tablet, R-2Normal      fluticasone (FLONASE) 50 MCG/ACT nasal spray SHAKE LIQUID AND USE 2 SPRAYS IN EACH NOSTRIL DAILY, Disp-48 g, R-0**Patient requests 90 days supply**Normal      aspirin 81 MG chewable tablet Take 1 tablet by mouth daily, Disp-30 tablet, R-3Normal      insulin glargine (BASAGLAR KWIKPEN) 100 UNIT/ML injection pen Inject 30 Units into the skin nightly, Disp-15 mL, R-1Normal      lisinopril (PRINIVIL;ZESTRIL) 5 MG tablet Take 1 tablet by mouth daily, Disp-30 tablet, R-3Normal      metoprolol succinate (TOPROL XL) 50 MG extended release tablet Take 1 tablet by mouth daily, Disp-30 tablet, R-3Normal oxyCODONE-acetaminophen (PERCOCET) 7.5-325 MG per tablet Take 1 tablet by mouth every 6 hours as needed (pain) for up to 30 days. , Disp-120 tablet, R-0Normal      pantoprazole (PROTONIX) 40 MG tablet TAKE 1 TABLET BY MOUTH EVERY MORNING BEFORE BREAKFAST, Disp-30 tablet, R-1Normal      gabapentin (NEURONTIN) 100 MG capsule TAKE 1-2 CAPSULES BY MOUTH THREE TIMES A DAY, Disp-180 capsule, R-5Normal      clopidogrel (PLAVIX) 75 MG tablet TAKE 1 TABLET BY MOUTH ONCE DAILY, Disp-90 tablet, R-1Normal       MG capsule TAKE 1 CAPSULE BY MOUTH TWICE DAILY, Disp-60 capsule, R-5Normal      thiamine (B-1) 100 MG/ML injection Infuse 1 mL intravenously daily, Disp-1 each, R-0Normal      Multiple Vitamins-Minerals (THERAPEUTIC MULTIVITAMIN-MINERALS) tablet Take 1 tablet by mouth daily, R-0OTC      Continuous Blood Gluc Sensor (DEXCOM G6 SENSOR) MISC Every 10 days, Disp-9 each, R-3Print      Continuous Blood Gluc Transmit (DEXCOM G6 TRANSMITTER) MISC 1 each by Does not apply route every 3 months, Disp-1 each, R-3Print      Continuous Blood Gluc  (DEXCOM G6 ) ADAM 1 each by Does not apply route Daily with lunch, Disp-1 each, R-0Print      DULoxetine (CYMBALTA) 60 MG extended release capsule TAKE 1 CAPSULE BY MOUTH EVERY DAY, Disp-30 capsule, R-5Normal      traZODone (DESYREL) 150 MG tablet TAKE ONE (1) TABLET BY MOUTH NIGHTLY, Disp-30 tablet, R-10Normal      pravastatin (PRAVACHOL) 40 MG tablet TAKE 1 TABLET BY MOUTH EACH EVENING, Disp-30 tablet, R-10Normal      B Complex-C-Folic Acid (VIRT-CAPS) 1 MG CAPS TAKE 1 CAPSULE BY MOUTH EVERY DAY, Disp-90 capsule, R-1Normal      Calcium Acetate, Phos Binder, 667 MG CAPS TAKE 1 CAPSULE BY MOUTH THREE TIMES DAILY WITH MEALS, Disp-90 capsule, R-3Normal      LINZESS 145 MCG capsule TAKE 1 CAPSULE BY MOUTH EVERY MORNING BEFORE BREAKFAST, Disp-30 capsule, R-10Normal      !! blood glucose test strips (GLUCOSE METER TEST) strip 1 each by In Vitro route 5 times daily As needed. , Disp-100 each, R-3Normal      nitroGLYCERIN (NITROSTAT) 0.4 MG SL tablet DISSOLVE 1 TABLET UNDER THE TONGUE AS NEEDED FOR CHEST PAIN EVERY 5 MINUTES UP TO 3 TIMES. IF NO RELIEF CALL 911., Disp-25 tablet, R-10Normal      insulin aspart (NOVOLOG FLEXPEN) 100 UNIT/ML injection pen Inject 20 Units into the skin 3 times daily (before meals), Disp-15 pen,R-5Normal      !! blood glucose test strips (FREESTYLE LITE) strip Disp-100 strip, R-3, NormalDaily As needed. vitamin D (ERGOCALCIFEROL) 17701 units CAPS capsule TK 1 C PO WEEKLY, R-11Historical Med      Tiotropium Bromide-Olodaterol (STIOLTO RESPIMAT) 2.5-2.5 MCG/ACT AERS Inhale 2 puffs into the lungs daily, Disp-2 Inhaler, R-02 samples given:  Lot #[id number], Exp 7/21 & Lot #[id number], Exp 7/21NO PRINT      Polyethylene Glycol 3350 GRAN Starting Wed 5/2/2018, Historical Med      !! Glucose Blood (BLOOD GLUCOSE TEST STRIPS) STRP TEST 3-4 TIMES DAILY, AS DIRECTED, Disp-100 strip, R-3Normal      Blood Glucose Monitoring Suppl ADAM Disp-1 Device, R-0, NormalUSE AS DIRECTED. Alcohol Swabs PADS Disp-300 each, R-3, NormalUSE AS DIRECTED      albuterol sulfate  (90 Base) MCG/ACT inhaler Inhale 2 puffs into the lungs every 6 hours as needed for Wheezing, Disp-1 Inhaler, R-3Print      ipratropium-albuterol (DUONEB) 0.5-2.5 (3) MG/3ML SOLN nebulizer solution Inhale 3 mLs into the lungs every 6 hours as needed for Shortness of Breath, Disp-360 mL, R-1Print      calcium carbonate (TUMS) 500 MG chewable tablet Take 1 tablet by mouth 3 times daily as needed for Heartburn. !! - Potential duplicate medications found. Please discuss with provider. Time Spent on discharge is more than 30 minutes in the examination, evaluation, counseling and review of medications and discharge plan. Signed:    Aria Lemus MD   2/7/2022      Thank you Lynnette Rajput MD for the opportunity to be involved in this patient's care.  If you have any questions or concerns please feel free to contact me at [phone].

## 2022-02-07 NOTE — TELEPHONE ENCOUNTER
Requesting Refill  oxyCODONE-acetaminophen (PERCOCET) 7.5-325 MG per tablet     Last Office Visit  -   1/21/22  Next Office Visit  -  none    Last Filled  -    Last UDS -    Contract -

## 2022-02-07 NOTE — TELEPHONE ENCOUNTER
Last Office Visit  -  1/21/22  Next Office Visit  -      Last Filled  -  1/10/22  Last UDS -    Contract -

## 2022-02-07 NOTE — TELEPHONE ENCOUNTER
----- Message from Logan Loera MD sent at 2/3/2022 10:44 AM EST -----    Follow up with Dr Manny Vargas in 4-6 weeks

## 2022-02-10 ENCOUNTER — OFFICE VISIT (OUTPATIENT)
Dept: CARDIOLOGY CLINIC | Age: 52
End: 2022-02-10
Payer: COMMERCIAL

## 2022-02-10 VITALS
HEIGHT: 69 IN | OXYGEN SATURATION: 98 % | BODY MASS INDEX: 38.06 KG/M2 | HEART RATE: 103 BPM | DIASTOLIC BLOOD PRESSURE: 78 MMHG | WEIGHT: 257 LBS | SYSTOLIC BLOOD PRESSURE: 162 MMHG

## 2022-02-10 DIAGNOSIS — I74.5 ILIAC ARTERY OCCLUSION, RIGHT (HCC): ICD-10-CM

## 2022-02-10 DIAGNOSIS — G47.33 OSA ON CPAP: Chronic | ICD-10-CM

## 2022-02-10 DIAGNOSIS — E11.42 DIABETIC POLYNEUROPATHY ASSOCIATED WITH TYPE 2 DIABETES MELLITUS (HCC): ICD-10-CM

## 2022-02-10 DIAGNOSIS — I50.42 CHRONIC COMBINED SYSTOLIC AND DIASTOLIC HEART FAILURE (HCC): ICD-10-CM

## 2022-02-10 DIAGNOSIS — Z99.89 OSA ON CPAP: Chronic | ICD-10-CM

## 2022-02-10 DIAGNOSIS — E78.2 MIXED HYPERLIPIDEMIA: ICD-10-CM

## 2022-02-10 DIAGNOSIS — E11.43 GASTROPARESIS DUE TO DM (HCC): ICD-10-CM

## 2022-02-10 DIAGNOSIS — I25.10 CAD S/P PERCUTANEOUS CORONARY ANGIOPLASTY: Primary | ICD-10-CM

## 2022-02-10 DIAGNOSIS — I35.0 NONRHEUMATIC AORTIC VALVE STENOSIS: ICD-10-CM

## 2022-02-10 DIAGNOSIS — I51.89 GRADE II DIASTOLIC DYSFUNCTION: ICD-10-CM

## 2022-02-10 DIAGNOSIS — Z95.2 S/P TAVR (TRANSCATHETER AORTIC VALVE REPLACEMENT): ICD-10-CM

## 2022-02-10 DIAGNOSIS — J44.9 ASTHMA-COPD OVERLAP SYNDROME (HCC): ICD-10-CM

## 2022-02-10 DIAGNOSIS — E11.9 TYPE 2 DIABETES MELLITUS WITHOUT COMPLICATION, WITHOUT LONG-TERM CURRENT USE OF INSULIN (HCC): ICD-10-CM

## 2022-02-10 DIAGNOSIS — D63.8 ANEMIA OF CHRONIC DISEASE: ICD-10-CM

## 2022-02-10 DIAGNOSIS — Z98.61 CAD S/P PERCUTANEOUS CORONARY ANGIOPLASTY: Primary | ICD-10-CM

## 2022-02-10 DIAGNOSIS — Z99.2 ESRD (END STAGE RENAL DISEASE) ON DIALYSIS (HCC): ICD-10-CM

## 2022-02-10 DIAGNOSIS — I25.5 ISCHEMIC CARDIOMYOPATHY: ICD-10-CM

## 2022-02-10 DIAGNOSIS — N18.6 ESRD (END STAGE RENAL DISEASE) ON DIALYSIS (HCC): ICD-10-CM

## 2022-02-10 DIAGNOSIS — K31.84 GASTROPARESIS DUE TO DM (HCC): ICD-10-CM

## 2022-02-10 DIAGNOSIS — Q23.1 BICUSPID AORTIC VALVE: ICD-10-CM

## 2022-02-10 DIAGNOSIS — Z86.73 HISTORY OF CVA (CEREBROVASCULAR ACCIDENT): ICD-10-CM

## 2022-02-10 PROCEDURE — 99215 OFFICE O/P EST HI 40 MIN: CPT | Performed by: NURSE PRACTITIONER

## 2022-02-10 PROCEDURE — 1111F DSCHRG MED/CURRENT MED MERGE: CPT | Performed by: NURSE PRACTITIONER

## 2022-02-10 PROCEDURE — 2022F DILAT RTA XM EVC RTNOPTHY: CPT | Performed by: NURSE PRACTITIONER

## 2022-02-10 PROCEDURE — 1036F TOBACCO NON-USER: CPT | Performed by: NURSE PRACTITIONER

## 2022-02-10 PROCEDURE — G8427 DOCREV CUR MEDS BY ELIG CLIN: HCPCS | Performed by: NURSE PRACTITIONER

## 2022-02-10 PROCEDURE — G8417 CALC BMI ABV UP PARAM F/U: HCPCS | Performed by: NURSE PRACTITIONER

## 2022-02-10 PROCEDURE — 3046F HEMOGLOBIN A1C LEVEL >9.0%: CPT | Performed by: NURSE PRACTITIONER

## 2022-02-10 PROCEDURE — G8482 FLU IMMUNIZE ORDER/ADMIN: HCPCS | Performed by: NURSE PRACTITIONER

## 2022-02-10 PROCEDURE — 3017F COLORECTAL CA SCREEN DOC REV: CPT | Performed by: NURSE PRACTITIONER

## 2022-02-10 PROCEDURE — 3023F SPIROM DOC REV: CPT | Performed by: NURSE PRACTITIONER

## 2022-02-10 RX ORDER — PRAVASTATIN SODIUM 40 MG
40 TABLET ORAL DAILY
Qty: 90 TABLET | Refills: 3 | Status: SHIPPED | OUTPATIENT
Start: 2022-02-10

## 2022-02-10 RX ORDER — CLOPIDOGREL BISULFATE 75 MG/1
75 TABLET ORAL DAILY
Qty: 90 TABLET | Refills: 3 | Status: SHIPPED | OUTPATIENT
Start: 2022-02-10 | End: 2022-05-09 | Stop reason: SDUPTHER

## 2022-02-10 RX ORDER — LISINOPRIL 10 MG/1
10 TABLET ORAL DAILY
Qty: 90 TABLET | Refills: 3 | Status: ON HOLD | OUTPATIENT
Start: 2022-02-10 | End: 2022-03-03 | Stop reason: HOSPADM

## 2022-02-10 RX ORDER — METOPROLOL SUCCINATE 50 MG/1
50 TABLET, EXTENDED RELEASE ORAL DAILY
Qty: 90 TABLET | Refills: 3 | Status: ON HOLD | OUTPATIENT
Start: 2022-02-10 | End: 2022-03-03 | Stop reason: HOSPADM

## 2022-02-10 RX ORDER — ISOSORBIDE MONONITRATE 30 MG/1
30 TABLET, EXTENDED RELEASE ORAL DAILY
Qty: 90 TABLET | Refills: 3 | Status: ON HOLD | OUTPATIENT
Start: 2022-02-10 | End: 2022-03-03 | Stop reason: HOSPADM

## 2022-02-10 RX ORDER — TORSEMIDE 100 MG/1
100 TABLET ORAL DAILY
Qty: 90 TABLET | Refills: 1 | Status: ON HOLD | OUTPATIENT
Start: 2022-02-10 | End: 2022-03-03 | Stop reason: HOSPADM

## 2022-02-10 NOTE — PROGRESS NOTES
[address]   Cardiac Evaluation    Primary Care Doctor:  Froy Izaguirre MD    Chief Complaint   Patient presents with   [address] from [facility]    Chest Pain    Coronary Artery Disease    Shortness of Breath          Assessment:    1. CAD S/P percutaneous coronary angioplasty    2. Bicuspid aortic valve    3. Nonrheumatic aortic valve stenosis    4. S/P TAVR (transcatheter aortic valve replacement)    5. Ischemic cardiomyopathy    6. Chronic combined systolic and diastolic heart failure ([address]    7. Grade II diastolic dysfunction    8. Mixed hyperlipidemia    9. Anemia of chronic disease    10. Asthma-COPD overlap syndrome ([facility]    11. Type 2 diabetes mellitus without complication, without long-term current use of insulin ([facility]    12. Diabetic polyneuropathy associated with type 2 diabetes mellitus ([facility]    13. Gastroparesis due to DM ([facility] [facility]    14. ESRD (end stage renal disease) on dialysis ([facility]    15. History of CVA (cerebrovascular accident)    12. Iliac artery occlusion, right ([facility] [facility]    17. ZAINAB on CPAP          Plan:   1. Increase the lisinopril to 10 mg once daily   2. Add isosorbide (Imdur) 30 mg once daily  3. Restart torsemide 100 mg once daily  4. No change in the metoprolol (Toprol) 50 mg once daily, aspirin 81 mg daily, clopidogrel (Plavix) 75 mg daily and pravastatin (Pravachol) 40 mg once daily  5. Estimated target dry weight after dialysis 119 kg (= 262 lbs)  6. Contact Dr. Alfred Phalen regarding the CPAP vs Bipap and oxygen  7. Follow up with Dr. Sivan Forbes as planned  8. Follow up with me in ~ 6 weeks       Vitals:    02/10/22 1434   BP: (!) 162/78   Site: Right Upper Arm   Position: Sitting   Cuff Size: Large Adult   Pulse: 103   SpO2: 98%   Weight: 257 lb (116.6 kg)   Height: 5' 9\" (1.753 m)       History of Present Illness:   I had the pleasure of seeing Rebecca Flores in follow up for recent hospitalization.   Rebecca Flores was hospitalized in January 2022 with shortness of breath, + Troponin, abnormal stress test.  [facility] with + RCA disease treated with PATRICIA X1, noted LVEDP 30. He has hx of CAD s/p PCI to LAD in 2015, AS s/p TAVR 2019, ICM, s/d CHF, HYPERTENSION, HLD, DM, PAD s/p PTA R iliac artery, ESRD on HD, chronic anemia, ZAINAB on CPAP, COPD, and hx of CVA. He is feeling terrible. He has worsening shortness of breath with any activity and at rest.  States the CPAP is not working/ helping him. He feels he did better on the Bipap in the hospital.   He had chest pain yesterday after dialysis for about 4 hours, constant, left sided, at rest.  Took an aspirin without improvement. He did not take a nitroglycerin. His BP is labile, goes up and down with HD. He becomes nauseated and vomits if drops too low. Post HD weight on 2/4/22 was 119 kg - new target dry weight. Dry weight after HD on 2/7/22 was 121.5 kg. Does not know post weight after HD yesterday and is not in Care Everywhere. His appetite is all over the place. He still urinates some, frequent small amounts. + orthopnea. Sleep is poor, mostly due to back pain. Does not have glucometer, does not check blood sugars. Does take insulin 60 units nightly and 10 units with each meal.   Has had multiple medicine changes with hospitalizations. Some were sent to [address] [place] [address] which he no longer uses. He was filling pill packs himself but became too difficult so stopped. Now takes from bottles. Juan Francisco Nesbitt describes symptoms including chest pain, dyspnea, orthopnea, fatigue, edema but denies PND, early saiety, syncope.      NYHA:   IV  ACC/ AHA Stage:    C    Past Medical History:   has a past medical history of Ambulatory dysfunction, Aortic stenosis, Arthritis, Asthma, Bilateral hilar adenopathy syndrome, CAD (coronary artery disease), Cardiomyopathy ([address] CHF (congestive heart failure) ([address] Chronic pain, COPD (chronic obstructive pulmonary disease) ([address] Depression, Diabetes mellitus ([address] Difficult intravenous access, Emphysema of lung ([facility] ESRD (end stage renal disease) on dialysis ([facility] Fear of needles, Gastric ulcer, GERD (gastroesophageal reflux disease), Heart valve problem, Hemodialysis patient ([address] History of spinal fracture, Hx of blood clots, Hyperlipidemia, Hypertension, MI (myocardial infarction) ([address] Neuromuscular disorder ([facility] [address] [facility] Numbness and tingling in left arm, Pneumonia, PONV (postoperative nausea and vomiting), Prolonged emergence from general anesthesia, Sleep apnea, Stroke ([address] TIA (transient ischemic attack), and Unspecified diseases of blood and blood-forming organs. Surgical History:   has a past surgical history that includes Tonsillectomy; cyst removal (08/14/2013); Colonoscopy; Coronary angioplasty with stent (05/26/15); vascular surgery (aprx 2 years ago); Colonoscopy (2/29/2015); Upper gastrointestinal endoscopy (01/06/2016); Upper gastrointestinal endoscopy (01/29/2017); other surgical history (02/01/2017); Dialysis fistula creation (Left, 10/30/2017); Diagnostic Cardiac Cath Lab Procedure; other surgical history (2018); other surgical history (Bilateral, 06/26/2018); pr lap insertion tunneled intraperitoneal catheter (N/A, 9/21/2018); other surgical history (Right, 09/2018); Dialysis fistula creation (Left, 3/27/2019); other surgical history (05/28/2019); Endoscopy, colon, diagnostic; Catheter Removal (Right, 7/2/2019); and Aortic valve replacement (N/A, 10/15/2019). Social History:   reports that he quit smoking about 21 months ago. His smoking use included cigarettes. He has a 16.50 pack-year smoking history. He has never used smokeless tobacco. He reports previous alcohol use. He reports that he does not use drugs.    Family History:   Family History   Problem Relation Age of Onset    Diabetes Mother     Heart Disease Father     Kidney Disease Sister         stage 4-kidney failure    Cancer Sister     Heart Disease Sister     Obesity Sister     Cancer Sister  Heart Disease Sister     Obesity Sister     Alcohol Abuse Brother        Home Medications:  Prior to Admission medications    Medication Sig Start Date End Date Taking? Authorizing Provider   oxyCODONE-acetaminophen (PERCOCET) 7.5-325 MG per tablet Take 1 tablet by mouth every 6 hours as needed (pain) for up to 30 days.  2/7/22 3/9/22 Yes Jason Caldera MD   QUEtiapine (SEROQUEL) 50 MG tablet TAKE 1 TABLET BY MOUTH IN THE EVENING 1/25/22  Yes Jason Caldera MD   cyclobenzaprine (FLEXERIL) 10 MG tablet TAKE 1 TABLET BY MOUTH EVERY 8 HOURS AS NEEDED 1/24/22  Yes Jason Caldera MD   fluticasone (FLONASE) 50 MCG/ACT nasal spray SHAKE LIQUID AND USE 2 SPRAYS IN [facility] NOSTRIL DAILY 1/19/22  Yes Jason Caldera MD   aspirin 81 MG chewable tablet Take 1 tablet by mouth daily 1/18/22  Yes Kristine Gould MD   insulin glargine [facility]) 100 UNIT/ML injection pen Inject 30 Units into the skin nightly 1/17/22  Yes Kristine Gould MD   lisinopril (PRINIVIL;ZESTRIL) 5 MG tablet Take 1 tablet by mouth daily 1/18/22  Yes Kristine Gould MD   metoprolol succinate (TOPROL XL) 50 MG extended release tablet Take 1 tablet by mouth daily 1/18/22  Yes Kristine Gould MD   pantoprazole (PROTONIX) 40 MG tablet TAKE 1 TABLET BY MOUTH EVERY MORNING BEFORE BREAKFAST 12/21/21  Yes Jason Caldera MD   gabapentin (NEURONTIN) 100 MG capsule TAKE 1-2 CAPSULES BY MOUTH THREE TIMES A DAY 11/29/21 2/10/22 Yes Jason Caldera MD   clopidogrel (PLAVIX) 75 MG tablet TAKE 1 TABLET BY MOUTH ONCE DAILY 11/16/21  Yes Taryn Marsh MD    MG capsule TAKE 1 CAPSULE BY MOUTH TWICE DAILY 11/12/21  Yes JAY Eason CNP   thiamine (B-1) 100 MG/ML injection Infuse 1 mL intravenously daily 10/15/21  Yes JAY Olguin CNP   Multiple Vitamins-Minerals (THERAPEUTIC MULTIVITAMIN-MINERALS) tablet Take 1 tablet by mouth daily 10/15/21  Yes JAY Olguin - CNP   Continuous Blood Gluc Sensor (DEXCOM G6 AS DIRECTED. 4/25/18  Yes Jason Cunningham MD   Alcohol Swabs PADS USE AS DIRECTED 4/25/18  Yes Jason Cunningham MD   albuterol sulfate  (90 Base) MCG/ACT inhaler Inhale 2 puffs into the lungs every 6 hours as needed for Wheezing 11/8/17  Yes Lorenza Saul MD   ipratropium-albuterol (DUONEB) 0.5-2.5 (3) MG/3ML SOLN nebulizer solution Inhale 3 mLs into the lungs every 6 hours as needed for Shortness of Breath 10/15/17  Yes Peace Teague MD   calcium carbonate (TUMS) 500 MG chewable tablet Take 1 tablet by mouth 3 times daily as needed for Heartburn. Yes Historical Provider, MD   Glucose Blood (BLOOD GLUCOSE TEST STRIPS) STRP TEST 3-4 TIMES DAILY, AS DIRECTED  Patient not taking: Reported on 10/5/2021 4/25/18   Jason Cunningham MD        Allergies:  Morphine     Physical Examination:    Vitals:    02/10/22 1434   BP: (!) 162/78   Site: Right Upper Arm   Position: Sitting   Cuff Size: Large Adult   Pulse: 103   SpO2: 98%   Weight: 257 lb (116.6 kg)   Height: 5' 9\" (1.753 m)      Constitutional and General Appearance: Warm and dry, no apparent distress, normal coloration  HEENT:  Normocephalic, atraumatic  Respiratory:  · Normal excursion and expansion without use of accessory muscles  · Resp Auscultation: Normal breath sounds without dullness  Cardiovascular:  · The apical impulses not displaced  · Heart tones are crisp and normal  · JVP difficult to assess  · Regular rate and rhythm, normal S1S2, + murmur, no g/r  · Peripheral pulses are symmetrical and full  · There is no clubbing, cyanosis of the extremities.   · 1+ pretibial BLE edema  · Pedal Pulses: 2+ and equal     Abdomen:  · No masses or tenderness, obese, firm  · Liver/Spleen: No Abnormalities Noted  Neurological/Psychiatric:  · Alert and oriented in all spheres  · Moves all extremities well  · Exhibits normal gait balance and coordination - slow  · No abnormalities of mood, affect, memory, mentation, or behavior are noted    Lab Data:  CBC:   Lab Results   Component Value Date    WBC 5.8 02/04/2022    WBC 7.6 02/03/2022    WBC 8.2 02/02/2022    RBC 3.01 02/04/2022    RBC 2.89 02/03/2022    RBC 3.12 02/02/2022    HGB 8.9 02/04/2022    HGB 8.6 02/03/2022    HGB 9.1 02/02/2022    HCT 26.8 02/04/2022    HCT 25.8 02/03/2022    HCT 27.9 02/02/2022    MCV 89.0 02/04/2022    MCV 89.3 02/03/2022    MCV 89.3 02/02/2022    RDW 15.9 02/04/2022    RDW 16.2 02/03/2022    RDW 15.9 02/02/2022     02/04/2022     02/03/2022     02/02/2022     BMP:  Lab Results   Component Value Date     02/04/2022     02/03/2022     02/02/2022    K 4.7 02/04/2022    K 4.5 02/03/2022    K 4.9 02/02/2022    K 4.2 02/01/2022    K 5.2 11/29/2021    K 5.1 10/12/2021    CL 91 02/04/2022    CL 95 02/03/2022    CL 95 02/02/2022    CO2 22 02/04/2022    CO2 22 02/03/2022    CO2 24 02/02/2022    PHOS 5.1 02/04/2022    PHOS 5.5 12/03/2021    PHOS 4.3 12/02/2021    BUN 50 02/04/2022    BUN 50 02/03/2022    BUN 71 02/02/2022    CREATININE 6.6 02/04/2022    CREATININE 6.5 02/03/2022    CREATININE 8.1 02/02/2022     BNP:   Lab Results   Component Value Date    PROBNP 32,858 02/01/2022    PROBNP 33,575 01/16/2022    PROBNP 57,050 01/12/2022     Troponin: No components found for: TROPONIN  Lipids:   Lab Results   Component Value Date    TRIG 214 11/30/2021    TRIG 187 10/12/2021    HDL 53 11/30/2021    HDL 43 10/12/2021    LDLCALC 155 11/30/2021    LDLCALC 128 10/12/2021    LDLDIRECT 199 09/04/2014    LDLDIRECT 172 03/07/2014     Cardiac Imaging:  RIGHT RADIAL ARTERY U/S 1/17/2022  Impressions Right Impression The right radial artery demonstrates normal triphasic flow with no evidence of pseudoaneurysm. The right radial veins demonstrate normal phasic flow with no evidence of thrombosis. Conclusions   Summary   Normal right radial artery and veins. Coronary angiogram 1/14/2022:  1. Left heart catheterization  2.  Selective left and right coronary angiogram  3. PCI of the RCA with PATRICIA  Procedure Findings:  1. 99% mid RCA  2. 60-70% CIRC and DIAG  3. Elevated left heart hemodynamics              ~LVEDP 30       Details:              Aquilino Kamara was brought to the cardiac catheterization lab in a fasting state after informed consent was obtained. If the patient was able to provide written consent, it was obtained. The patient's vitals were monitored through out the procedure. The patient was sedated using the appropriate levels of sedation and ASA guidelines. The appropriate access site area was prepped and drapped in a sterile fashion. The area was anesthetized with 2% lidocaine. Using the modified Seldinger technique, an arterial sheath was introduced into the arterial access site using a single anterior wall puncture. The sheath was flushed and prepped in usual fashion. Catheters used during the procedure included 5 Iceland[place] TIG 4.0 catheter. The catheters were advanced and removed over a .035\" wire, into the appropriate positions. Multiple angiographic views were obtained. An LV gram was not obtained. ~The interventional portion of the procedure was completed utilizing a multipurpose 6 [place] guide. Multipurpose guide was advanced into the right coronary ostium. A gentleman therapy aspirin, Plavix, Angiomax was initiated. A BMW wire was advanced into the distal right coronary artery. The lesion was initially predilated with a 2.75 x 15 mm Euphora balloon followed by a 3.5 mm x 20 mm Euphora balloon and subsequently stented with a resolute Willam 4.0 mm x 38 mm balloon. We did experience a no reflow phenomenon. We separately done. A twin pass catheter and then did local drug delivery with 200 mcg of nitroprusside and 200 mcgnitroglycerin. Provided restoration of SCARLETT-3 flow. Findings:  1.  Left main coronary artery was normal. It gave off the left anterior descending artery and left circumflex. 2. Left anterior descending artery has 60% severe atherosclerotic disease. It was moderate in size. It gave off septal perforators and a moderate sized diagonal branch. The LAD covered the entire apex of the left ventricle. 3. Left circumflex has 60% severe atherosclerotic disease. It was moderate in size. There was a moderate sized obtuse marginal branch. 4. Right coronary artery has 99% severe atherosclerotic disease. It was moderate in size and was the dominant artery. CONCLUSIONS:  1. Apercutaneous coronary intervention of the right coronary artery utilizing a single resolute Willam drug-eluting stent  ASSESSMENT/RECOMMENDATIONS:  1. Dual antiplatelet therapy  2. Medical management of the remaining coronary disease     Echo 1/2022:   Limited only f/u for LVEF and RVF. The left ventricular systolic function is mildly reduced with an ejection fraction of 40-45 %. There is hypokinesis of the apex and inferior walls. There is a very small circumferential pericardial effusion noted. No tamponade physiology. The right ventricle is normal in size and function. Echo 7/9/2020:  Low Normal systolic function with an estimated ejection fraction of 50%. Left ventricular function and wall motion is difficult to estimate due to poor endocardial visualization. Grade I diastolic dysfunction with normal filing pressure. Normally functioning TAVR 29 mm Langford Leyda S3 bioprosthetic valve in   aortic position appears well seated with a max velocity of 2.11 m/sec,   maximum gradient of 18 mmHg and a mean gradient of 8 mmHg. There is no evidence of aortic regurgitation  Coronary angiogram 8/22/2019:  LM <20%  LAD patent stent  Cx <20%  RCA 30%  Severe AS by echo  Proceed w/ TAVR  Echo 1/24/2017:  Summary   Left ventricular systolic function is normal with an ejection fraction of   55-60%. No wall motion abnormalities noted. Mild concentric left ventricular hypertrophy.    Grade II diastolic dysfunction with elevated filling pressure. Mildly dilated left atrium. Moderate aortic stenosis. Mild aortic regurgitation. Compared to previous study from 10/27/2016, aortic stenosis has not   progressed. R/LHC 1/23/2017:  LM <20%  LAD 30% w/ patent stent  Cx 20-30%  RCA 20-30%  LVEDP 19  RA 8, RV 41/10, PA 45/14/31, W 15      Nonobstructive CAD  Mild elevated right heart pressures      Echo Oct 2016:   Normal left ventricle size with mild concentric left ventricular hypertrophy.   Normal systolic function with an estimated ejection fraction of 55%.   No regional wall motion abnormalities are seen.   Elevated left ventricular diastolic filling pressure ( E/e' 21 ).  The aortic valve is thickened/calcified with decreased leaflet mobility. Cannot exclude a bicuspid valve.  The aortic valve area is calculated at 1.0 cm2 with a max velocity of 3.36 m/sec, maximum pressure gradient of 45 mmHg and a mean pressure gradient of 23 mmHg. This is c/w moderate aortic stenosis. Color flow demonstrates eccentric jet suggesting mild aortic regurgitation.   Trace mitral and tricuspid regurgitation. Systolic pulmonary artery pressure (SPAP) is normal and estimated at 22 mmHg (RA pressure 3 mmHg).   There is mild concentric left ventricular hypertrophy. MPI 6/18/15: There is a very small and mild fixed posterior-basal defect consistent with  fibrosis. There is no evidence for ischemia. Left ventricular function is reduced with and estimated LVEF of 40%. The LV is severely dilated. CATH: 5/26/15, Dr. Esther Foote  LM <20%   LAD 70% mid.  FFR 0.77  D1 50% prox  Cx 20%  RCA 20%  LVEDP 22mmHg  RA 9, RV 42/10, PA 44/16/31, W 18  PA sat 63%    PTCA LAD  3.0 x 15 PATRICIA  prox portion post 3.5 x 8 NC balloon    DAPT, statin  Resume lasix and ARB at discharge provided Cr stable  Renal fxn will not tolerate higher dose of lasix than 40 daily  Needs smoking cessation, weight loss, exercise  Rec OP sleep eval Echo 3/26/15:   Left ventricle size is normal.  Mild c LVH. EF 55 % to 60 %. No regional wall motion abnormalities are noted. Normal diastolic filling pattern for age. Mild mitral regurgitation is present. Mildly dilated left atrium by increased left atrial volume. Aortic valve is mildly thickened and calcified. The aortic valve area is calculated at 1.4 cm2 with a maximum gradient of 32 mmHg and a mean gradient of 18 mmHg. This is c/w mild aortic stenosis. Trace aortic regurgitation. Trivial tricuspid regurgitation. Systolic pulmonary artery pressure (SPAP) is normal and estimated at 14 mmHg (RA pressure 3 mm Hg). More than 50 minutes of time was spent during this visit, more than 50% of which was spent in direct patient contact, counseling and/or coordinating care.      I appreciate the opportunity of cooperating in the care of this individual.    Gerson Zambrano, JAY - CNP, 2/10/2022, 2:47 PM

## 2022-02-11 ENCOUNTER — CARE COORDINATION (OUTPATIENT)
Dept: CASE MANAGEMENT | Age: 52
End: 2022-02-11

## 2022-02-11 NOTE — CARE COORDINATION
[address] Transitions Follow Up Call    2022    Patient: Kaelyn Rob  Patient : 1970   MRN: [id number]  Reason for Admission: sob   Discharge Date: 22 RARS: Readmission Risk Score: 44.2 ( )         Spoke with: Riddhi Brownlee with patient who reported he doesn't feel that well and reported he has sob all the time. Patient denied any cp today and reported the new medication that DAVID Lorenzana started him on has helped his cp. Patient reported he has apt with PCP next week and will discuss the possibility of obtaining oxygen. Patient denied any other concerns at this time. CTN advised patient of use of urgent care or physicians 24 hr access line if assistance is needed after hours. Care Transitions Subsequent and Final Call    Subsequent and Final Calls  Are you currently active with any services?: Home Health  Care Transitions Interventions  Other Interventions:            Follow Up  Future Appointments   Date Time Provider Nelly Darby   2022 11:15 AM MD SUSANA Chavez [facility] - SAVANNA   3/24/2022  3:00 PM JAY Méndez - ILAN RAY   3/31/2022  1:40 PM Lauri Oseguera MD AND PULM FLOR NELSONN, RN, [facility]  Care Transition Nurse  [phone] mobile

## 2022-02-21 ENCOUNTER — APPOINTMENT (OUTPATIENT)
Dept: CT IMAGING | Age: 52
DRG: 871 | End: 2022-02-21
Payer: COMMERCIAL

## 2022-02-21 ENCOUNTER — HOSPITAL ENCOUNTER (INPATIENT)
Age: 52
LOS: 10 days | Discharge: HOME | DRG: 871 | End: 2022-03-03
Attending: EMERGENCY MEDICINE | Admitting: INTERNAL MEDICINE
Payer: COMMERCIAL

## 2022-02-21 ENCOUNTER — APPOINTMENT (OUTPATIENT)
Dept: GENERAL RADIOLOGY | Age: 52
DRG: 871 | End: 2022-02-21
Payer: COMMERCIAL

## 2022-02-21 DIAGNOSIS — R79.89 ELEVATED BRAIN NATRIURETIC PEPTIDE (BNP) LEVEL: ICD-10-CM

## 2022-02-21 DIAGNOSIS — Z79.4 TYPE 2 DIABETES MELLITUS WITH OTHER SKIN COMPLICATION, WITH LONG-TERM CURRENT USE OF INSULIN (HCC): ICD-10-CM

## 2022-02-21 DIAGNOSIS — A41.9 SEPSIS WITHOUT ACUTE ORGAN DYSFUNCTION, DUE TO UNSPECIFIED ORGANISM (HCC): Primary | ICD-10-CM

## 2022-02-21 DIAGNOSIS — N18.6 ESRD ON HEMODIALYSIS (HCC): ICD-10-CM

## 2022-02-21 DIAGNOSIS — E11.628 TYPE 2 DIABETES MELLITUS WITH OTHER SKIN COMPLICATION, WITH LONG-TERM CURRENT USE OF INSULIN (HCC): ICD-10-CM

## 2022-02-21 DIAGNOSIS — Z99.2 ESRD ON HEMODIALYSIS (HCC): ICD-10-CM

## 2022-02-21 DIAGNOSIS — K61.1 PERI-RECTAL ABSCESS: ICD-10-CM

## 2022-02-21 DIAGNOSIS — R06.02 SHORTNESS OF BREATH: ICD-10-CM

## 2022-02-21 PROBLEM — R65.10 SIRS (SYSTEMIC INFLAMMATORY RESPONSE SYNDROME) (HCC): Status: ACTIVE | Noted: 2022-02-21

## 2022-02-21 LAB
A/G RATIO: 1.5 (ref 1.1–2.2)
ALBUMIN SERPL-MCNC: 3.8 G/DL (ref 3.4–5)
ALP BLD-CCNC: 115 U/L (ref 40–129)
ALT SERPL-CCNC: 7 U/L (ref 10–40)
ANION GAP SERPL CALCULATED.3IONS-SCNC: 19 MMOL/L (ref 3–16)
AST SERPL-CCNC: 8 U/L (ref 15–37)
BASE EXCESS VENOUS: -5.3 MMOL/L (ref -3–3)
BASOPHILS ABSOLUTE: 0 K/UL (ref 0–0.2)
BASOPHILS RELATIVE PERCENT: 0.3 %
BILIRUB SERPL-MCNC: <0.2 MG/DL (ref 0–1)
BUN BLDV-MCNC: 84 MG/DL (ref 7–20)
CALCIUM SERPL-MCNC: 8.4 MG/DL (ref 8.3–10.6)
CARBOXYHEMOGLOBIN: 4.1 % (ref 0–1.5)
CHLORIDE BLD-SCNC: 89 MMOL/L (ref 99–110)
CO2: 22 MMOL/L (ref 21–32)
CREAT SERPL-MCNC: 8 MG/DL (ref 0.9–1.3)
EOSINOPHILS ABSOLUTE: 0.2 K/UL (ref 0–0.6)
EOSINOPHILS RELATIVE PERCENT: 1.8 %
GFR AFRICAN AMERICAN: 9
GFR NON-AFRICAN AMERICAN: 7
GLUCOSE BLD-MCNC: 234 MG/DL (ref 70–99)
GLUCOSE BLD-MCNC: 237 MG/DL (ref 70–99)
HCO3 VENOUS: 20.8 MMOL/L (ref 23–29)
HCT VFR BLD CALC: 26.9 % (ref 40.5–52.5)
HEMOGLOBIN: 8.9 G/DL (ref 13.5–17.5)
LACTIC ACID, SEPSIS: 1.2 MMOL/L (ref 0.4–1.9)
LACTIC ACID, SEPSIS: 2.3 MMOL/L (ref 0.4–1.9)
LYMPHOCYTES ABSOLUTE: 0.8 K/UL (ref 1–5.1)
LYMPHOCYTES RELATIVE PERCENT: 6.3 %
MCH RBC QN AUTO: 29.9 PG (ref 26–34)
MCHC RBC AUTO-ENTMCNC: 33.2 G/DL (ref 31–36)
MCV RBC AUTO: 90 FL (ref 80–100)
METHEMOGLOBIN VENOUS: 0.5 %
MONOCYTES ABSOLUTE: 0.8 K/UL (ref 0–1.3)
MONOCYTES RELATIVE PERCENT: 5.7 %
NEUTROPHILS ABSOLUTE: 11.3 K/UL (ref 1.7–7.7)
NEUTROPHILS RELATIVE PERCENT: 85.9 %
O2 SAT, VEN: 58 %
O2 THERAPY: ABNORMAL
PCO2, VEN: 42.8 MMHG (ref 40–50)
PDW BLD-RTO: 16.4 % (ref 12.4–15.4)
PERFORMED ON: ABNORMAL
PH VENOUS: 7.3 (ref 7.35–7.45)
PLATELET # BLD: 278 K/UL (ref 135–450)
PMV BLD AUTO: 8.1 FL (ref 5–10.5)
PO2, VEN: 34.4 MMHG (ref 25–40)
POTASSIUM REFLEX MAGNESIUM: 4.8 MMOL/L (ref 3.5–5.1)
PRO-BNP: ABNORMAL PG/ML (ref 0–124)
RBC # BLD: 2.98 M/UL (ref 4.2–5.9)
SODIUM BLD-SCNC: 130 MMOL/L (ref 136–145)
TCO2 CALC VENOUS: 22 MMOL/L
TOTAL PROTEIN: 6.3 G/DL (ref 6.4–8.2)
TROPONIN: 0.15 NG/ML
TROPONIN: 0.15 NG/ML
WBC # BLD: 13.1 K/UL (ref 4–11)

## 2022-02-21 PROCEDURE — 2580000003 HC RX 258: Performed by: NURSE PRACTITIONER

## 2022-02-21 PROCEDURE — 6370000000 HC RX 637 (ALT 250 FOR IP): Performed by: NURSE PRACTITIONER

## 2022-02-21 PROCEDURE — 72192 CT PELVIS W/O DYE: CPT

## 2022-02-21 PROCEDURE — 85025 COMPLETE CBC W/AUTO DIFF WBC: CPT

## 2022-02-21 PROCEDURE — 83880 ASSAY OF NATRIURETIC PEPTIDE: CPT

## 2022-02-21 PROCEDURE — 6360000002 HC RX W HCPCS: Performed by: NURSE PRACTITIONER

## 2022-02-21 PROCEDURE — 84484 ASSAY OF TROPONIN QUANT: CPT

## 2022-02-21 PROCEDURE — 99285 EMERGENCY DEPT VISIT HI MDM: CPT

## 2022-02-21 PROCEDURE — 93005 ELECTROCARDIOGRAM TRACING: CPT | Performed by: NURSE PRACTITIONER

## 2022-02-21 PROCEDURE — 96367 TX/PROPH/DG ADDL SEQ IV INF: CPT

## 2022-02-21 PROCEDURE — 1200000000 HC SEMI PRIVATE

## 2022-02-21 PROCEDURE — 96375 TX/PRO/DX INJ NEW DRUG ADDON: CPT

## 2022-02-21 PROCEDURE — 71045 X-RAY EXAM CHEST 1 VIEW: CPT

## 2022-02-21 PROCEDURE — 82803 BLOOD GASES ANY COMBINATION: CPT

## 2022-02-21 PROCEDURE — 2700000000 HC OXYGEN THERAPY PER DAY

## 2022-02-21 PROCEDURE — 83605 ASSAY OF LACTIC ACID: CPT

## 2022-02-21 PROCEDURE — 94761 N-INVAS EAR/PLS OXIMETRY MLT: CPT

## 2022-02-21 PROCEDURE — 87040 BLOOD CULTURE FOR BACTERIA: CPT

## 2022-02-21 PROCEDURE — 80053 COMPREHEN METABOLIC PANEL: CPT

## 2022-02-21 PROCEDURE — 36415 COLL VENOUS BLD VENIPUNCTURE: CPT

## 2022-02-21 PROCEDURE — 96365 THER/PROPH/DIAG IV INF INIT: CPT

## 2022-02-21 RX ORDER — LISINOPRIL 10 MG/1
10 TABLET ORAL DAILY
Status: DISCONTINUED | OUTPATIENT
Start: 2022-02-22 | End: 2022-03-03 | Stop reason: HOSPADM

## 2022-02-21 RX ORDER — DEXTROSE MONOHYDRATE 25 G/50ML
12.5 INJECTION, SOLUTION INTRAVENOUS PRN
Status: DISCONTINUED | OUTPATIENT
Start: 2022-02-21 | End: 2022-02-21

## 2022-02-21 RX ORDER — DULOXETIN HYDROCHLORIDE 60 MG/1
60 CAPSULE, DELAYED RELEASE ORAL DAILY
Status: DISCONTINUED | OUTPATIENT
Start: 2022-02-22 | End: 2022-03-03 | Stop reason: HOSPADM

## 2022-02-21 RX ORDER — TORSEMIDE 100 MG/1
100 TABLET ORAL DAILY
Status: DISCONTINUED | OUTPATIENT
Start: 2022-02-22 | End: 2022-03-03 | Stop reason: HOSPADM

## 2022-02-21 RX ORDER — DEXTROSE MONOHYDRATE 50 MG/ML
100 INJECTION, SOLUTION INTRAVENOUS PRN
Status: DISCONTINUED | OUTPATIENT
Start: 2022-02-21 | End: 2022-03-03 | Stop reason: HOSPADM

## 2022-02-21 RX ORDER — CLOPIDOGREL BISULFATE 75 MG/1
75 TABLET ORAL DAILY
Status: DISCONTINUED | OUTPATIENT
Start: 2022-02-22 | End: 2022-03-03 | Stop reason: HOSPADM

## 2022-02-21 RX ORDER — SODIUM CHLORIDE 0.9 % (FLUSH) 0.9 %
5-40 SYRINGE (ML) INJECTION PRN
Status: DISCONTINUED | OUTPATIENT
Start: 2022-02-21 | End: 2022-03-03 | Stop reason: HOSPADM

## 2022-02-21 RX ORDER — CALCIUM ACETATE 667 MG/1
667 CAPSULE ORAL
Status: DISCONTINUED | OUTPATIENT
Start: 2022-02-22 | End: 2022-03-03 | Stop reason: HOSPADM

## 2022-02-21 RX ORDER — ACETAMINOPHEN 650 MG/1
650 SUPPOSITORY RECTAL EVERY 6 HOURS PRN
Status: DISCONTINUED | OUTPATIENT
Start: 2022-02-21 | End: 2022-02-22

## 2022-02-21 RX ORDER — METOPROLOL SUCCINATE 50 MG/1
50 TABLET, EXTENDED RELEASE ORAL DAILY
Status: DISCONTINUED | OUTPATIENT
Start: 2022-02-22 | End: 2022-02-27

## 2022-02-21 RX ORDER — NICOTINE POLACRILEX 4 MG
15 LOZENGE BUCCAL PRN
Status: DISCONTINUED | OUTPATIENT
Start: 2022-02-21 | End: 2022-03-03 | Stop reason: HOSPADM

## 2022-02-21 RX ORDER — CYCLOBENZAPRINE HCL 10 MG
5 TABLET ORAL 3 TIMES DAILY PRN
Status: DISCONTINUED | OUTPATIENT
Start: 2022-02-21 | End: 2022-03-03 | Stop reason: HOSPADM

## 2022-02-21 RX ORDER — IPRATROPIUM BROMIDE AND ALBUTEROL SULFATE 2.5; .5 MG/3ML; MG/3ML
1 SOLUTION RESPIRATORY (INHALATION) EVERY 6 HOURS PRN
Status: DISCONTINUED | OUTPATIENT
Start: 2022-02-21 | End: 2022-02-25

## 2022-02-21 RX ORDER — ALBUTEROL SULFATE 90 UG/1
2 AEROSOL, METERED RESPIRATORY (INHALATION) EVERY 6 HOURS PRN
Status: DISCONTINUED | OUTPATIENT
Start: 2022-02-21 | End: 2022-03-03 | Stop reason: HOSPADM

## 2022-02-21 RX ORDER — OXYCODONE AND ACETAMINOPHEN 10; 325 MG/1; MG/1
1 TABLET ORAL ONCE
Status: COMPLETED | OUTPATIENT
Start: 2022-02-21 | End: 2022-02-21

## 2022-02-21 RX ORDER — INSULIN GLARGINE 100 [IU]/ML
30 INJECTION, SOLUTION SUBCUTANEOUS NIGHTLY
Status: DISCONTINUED | OUTPATIENT
Start: 2022-02-21 | End: 2022-02-25

## 2022-02-21 RX ORDER — SODIUM CHLORIDE 9 MG/ML
25 INJECTION, SOLUTION INTRAVENOUS PRN
Status: DISCONTINUED | OUTPATIENT
Start: 2022-02-21 | End: 2022-03-03 | Stop reason: HOSPADM

## 2022-02-21 RX ORDER — HYDROMORPHONE HCL 110MG/55ML
1 PATIENT CONTROLLED ANALGESIA SYRINGE INTRAVENOUS ONCE
Status: COMPLETED | OUTPATIENT
Start: 2022-02-21 | End: 2022-02-21

## 2022-02-21 RX ORDER — ACETAMINOPHEN 325 MG/1
650 TABLET ORAL EVERY 6 HOURS PRN
Status: DISCONTINUED | OUTPATIENT
Start: 2022-02-21 | End: 2022-02-22

## 2022-02-21 RX ORDER — SODIUM CHLORIDE 0.9 % (FLUSH) 0.9 %
5-40 SYRINGE (ML) INJECTION EVERY 12 HOURS SCHEDULED
Status: DISCONTINUED | OUTPATIENT
Start: 2022-02-21 | End: 2022-03-03 | Stop reason: HOSPADM

## 2022-02-21 RX ORDER — ISOSORBIDE MONONITRATE 30 MG/1
30 TABLET, EXTENDED RELEASE ORAL DAILY
Status: DISCONTINUED | OUTPATIENT
Start: 2022-02-22 | End: 2022-03-03 | Stop reason: HOSPADM

## 2022-02-21 RX ORDER — TRAZODONE HYDROCHLORIDE 50 MG/1
150 TABLET ORAL NIGHTLY
Status: DISCONTINUED | OUTPATIENT
Start: 2022-02-21 | End: 2022-03-03 | Stop reason: HOSPADM

## 2022-02-21 RX ORDER — HEPARIN SODIUM 5000 [USP'U]/ML
5000 INJECTION, SOLUTION INTRAVENOUS; SUBCUTANEOUS EVERY 8 HOURS SCHEDULED
Status: DISCONTINUED | OUTPATIENT
Start: 2022-02-21 | End: 2022-02-26

## 2022-02-21 RX ORDER — FENTANYL CITRATE 50 UG/ML
50 INJECTION, SOLUTION INTRAMUSCULAR; INTRAVENOUS ONCE
Status: DISCONTINUED | OUTPATIENT
Start: 2022-02-21 | End: 2022-02-21

## 2022-02-21 RX ORDER — GABAPENTIN 100 MG/1
100 CAPSULE ORAL 3 TIMES DAILY
Status: DISCONTINUED | OUTPATIENT
Start: 2022-02-21 | End: 2022-03-03 | Stop reason: HOSPADM

## 2022-02-21 RX ORDER — ASPIRIN 81 MG/1
81 TABLET, CHEWABLE ORAL DAILY
Status: DISCONTINUED | OUTPATIENT
Start: 2022-02-22 | End: 2022-02-26

## 2022-02-21 RX ORDER — ONDANSETRON 2 MG/ML
4 INJECTION INTRAMUSCULAR; INTRAVENOUS EVERY 30 MIN PRN
Status: DISCONTINUED | OUTPATIENT
Start: 2022-02-21 | End: 2022-02-21

## 2022-02-21 RX ORDER — OXYCODONE AND ACETAMINOPHEN 7.5; 325 MG/1; MG/1
1 TABLET ORAL EVERY 6 HOURS PRN
Status: DISCONTINUED | OUTPATIENT
Start: 2022-02-21 | End: 2022-02-22

## 2022-02-21 RX ORDER — PRAVASTATIN SODIUM 40 MG
40 TABLET ORAL DAILY
Status: DISCONTINUED | OUTPATIENT
Start: 2022-02-22 | End: 2022-03-03 | Stop reason: HOSPADM

## 2022-02-21 RX ORDER — QUETIAPINE FUMARATE 25 MG/1
50 TABLET, FILM COATED ORAL NIGHTLY
Status: DISCONTINUED | OUTPATIENT
Start: 2022-02-21 | End: 2022-03-03 | Stop reason: HOSPADM

## 2022-02-21 RX ADMIN — INSULIN GLARGINE 30 UNITS: 100 INJECTION, SOLUTION SUBCUTANEOUS at 23:38

## 2022-02-21 RX ADMIN — OXYCODONE AND ACETAMINOPHEN 1 TABLET: 10; 325 TABLET ORAL at 18:53

## 2022-02-21 RX ADMIN — HEPARIN SODIUM 5000 UNITS: 5000 INJECTION INTRAVENOUS; SUBCUTANEOUS at 23:30

## 2022-02-21 RX ADMIN — PIPERACILLIN AND TAZOBACTAM 3375 MG: 3; .375 INJECTION, POWDER, LYOPHILIZED, FOR SOLUTION INTRAVENOUS at 20:11

## 2022-02-21 RX ADMIN — HYDROMORPHONE HYDROCHLORIDE 1 MG: 1 INJECTION, SOLUTION INTRAMUSCULAR; INTRAVENOUS; SUBCUTANEOUS at 20:02

## 2022-02-21 RX ADMIN — ONDANSETRON 4 MG: 2 INJECTION INTRAMUSCULAR; INTRAVENOUS at 20:06

## 2022-02-21 RX ADMIN — SODIUM CHLORIDE, PRESERVATIVE FREE 10 ML: 5 INJECTION INTRAVENOUS at 23:48

## 2022-02-21 RX ADMIN — TRAZODONE HYDROCHLORIDE 150 MG: 50 TABLET ORAL at 23:31

## 2022-02-21 RX ADMIN — ACETAMINOPHEN 650 MG: 325 TABLET ORAL at 23:31

## 2022-02-21 RX ADMIN — HYDROMORPHONE HYDROCHLORIDE 1 MG: 2 INJECTION, SOLUTION INTRAMUSCULAR; INTRAVENOUS; SUBCUTANEOUS at 23:43

## 2022-02-21 RX ADMIN — Medication 1500 MG: at 20:50

## 2022-02-21 RX ADMIN — INSULIN LISPRO 3 UNITS: 100 INJECTION, SOLUTION INTRAVENOUS; SUBCUTANEOUS at 23:32

## 2022-02-21 RX ADMIN — QUETIAPINE FUMARATE 50 MG: 25 TABLET ORAL at 23:31

## 2022-02-21 RX ADMIN — GABAPENTIN 100 MG: 100 CAPSULE ORAL at 23:31

## 2022-02-21 ASSESSMENT — PAIN - FUNCTIONAL ASSESSMENT: PAIN_FUNCTIONAL_ASSESSMENT: PREVENTS OR INTERFERES WITH ALL ACTIVE AND SOME PASSIVE ACTIVITIES

## 2022-02-21 ASSESSMENT — PAIN SCALES - GENERAL
PAINLEVEL_OUTOF10: 10

## 2022-02-21 ASSESSMENT — PAIN DESCRIPTION - FREQUENCY: FREQUENCY: INTERMITTENT

## 2022-02-21 ASSESSMENT — PAIN DESCRIPTION - ONSET: ONSET: ON-GOING

## 2022-02-21 ASSESSMENT — PAIN DESCRIPTION - PROGRESSION: CLINICAL_PROGRESSION: NOT CHANGED

## 2022-02-21 ASSESSMENT — PAIN DESCRIPTION - LOCATION: LOCATION: RECTUM

## 2022-02-21 ASSESSMENT — PAIN DESCRIPTION - DESCRIPTORS: DESCRIPTORS: SORE

## 2022-02-21 ASSESSMENT — PAIN DESCRIPTION - PAIN TYPE: TYPE: CHRONIC PAIN

## 2022-02-21 ASSESSMENT — PAIN DESCRIPTION - ORIENTATION: ORIENTATION: LOWER

## 2022-02-21 NOTE — ED PROVIDER NOTES
Evaluated by Advanced Practice Provider    EMERGENCY DEPARTMENT ENCOUNTER      CHIEFCOMPLAINT  Shortness of Breath (From dialysis. became very SOB at dialysis. Hx of COPD. No baseline O2.  97% room air on arrival. received very little dialysis prior to EMS arrival. )    MILAN Weston is a [address] y.o. male who presents to the emergency department with complaints of shortness of breath. SOB and chest tightness started this morning, this has been constant. Reports it has been worsening throughout the day. He had just gotten up for the day when it started. He tried to go to Dialysis and it got worse. Able to tolerate about half of his typical run. Ran 30 minutes of his treatment, pulled off 1 Kg. /91, HR 96. Dr. Roberta Hansen is his nephrologist. He does not normally wear oxygen, states he needs it but no one knows what to do or how to get it for him. Candace Hammer is pulmonologist. Does produce urine but has not today as he has not had much intake. Also reports he has a sore at the top of his butt that is very painful. Has been there for about 3 days. He has not ever had this happen before. Started out as a little pimple and then next day was 3 times bigger. Denies fevers, chills, sweats. The patient arrived to the ED via EMS transport.     PAST MEDICAL HISTORY    Past Medical History:   Diagnosis Date    Ambulatory dysfunction     walker for long distances, SOB with distance    Aortic stenosis     echo 2017    Arthritis     hands and hips    Asthma     Bilateral hilar adenopathy syndrome 6/3/2013    CAD (coronary artery disease)     Dr. Kaitlynn Mac [facility]) 04/19/2019    EF= 43%    CHF (congestive heart failure) (HCC)     Chronic pain     COPD (chronic obstructive pulmonary disease) ([address]     pulmonology Dr. April Brink    Depression     Diabetes mellitus ([address]     borderline    Difficult intravenous access     Emphysema of lung ([address]     ESRD (end stage renal disease) on dialysis ([facility]     MWF    Fear of needles     Gastric ulcer     GERD (gastroesophageal reflux disease)     Heart valve problem     bicuspic valve    Hemodialysis patient ([facility]     History of spinal fracture     work incident    Hx of blood clots     Bilateral lower extremities; stents in place    Hyperlipidemia     Hypertension     MI (myocardial infarction) ([facility] 2019    has had 9 MIs. 2019 was the last    Neuromuscular disorder ([facility]     due to CVA    Numbness and tingling in left arm     from fistula    Pneumonia     PONV (postoperative nausea and vomiting)     Prolonged emergence from general anesthesia     States requires more medication than most people    Sleep apnea     Uses CPAP    Stroke ([facility]     7mm thalamic cva 2017 deficts left side, left side weakness    TIA (transient ischemic attack)     Unspecified diseases of blood and blood-forming organs        SURGICAL HISTORY    Past Surgical History:   Procedure Laterality Date    AORTIC VALVE REPLACEMENT N/A 10/15/2019    TRANSCATHETER AORTIC VALVE REPLACEMENT FEMORAL APPROACH performed by Ross Davis MD at [address] [place] [address] Right 7/2/2019    PERITONEAL DIALYSIS CATHETER REMOVAL performed by Girish Camargo MD at [facility] COLONOSCOPY  2/29/2015    WN    CORONARY ANGIOPLASTY WITH STENT PLACEMENT  05/26/15    CYST REMOVAL  08/14/2013    EXCISION CYSTS, NECK X2 AND ABDOMINAL benign    DIAGNOSTIC CARDIAC CATH LAB PROCEDURE      DIALYSIS FISTULA CREATION Left 10/30/2017    LEFT BRACHIAL CEPHALIC FISTULA    DIALYSIS FISTULA CREATION Left 3/27/2019    LIGATION  AV FISTULA performed by Kristine Anthony MD at [facility], COLON, DIAGNOSTIC      OTHER SURGICAL HISTORY  02/01/2017    laparoscopic cholecystectomy with intraoperative cholangiogram    OTHER SURGICAL HISTORY  2018    PORT PLACEMENT  - vas cath    OTHER SURGICAL HISTORY Bilateral 06/26/2018    laprascopic peritoneal dialysis catheter placement    OTHER SURGICAL HISTORY Right 09/2018    peritoneal dialysis port placed on right side of abdomen    OTHER SURGICAL HISTORY  05/28/2019    PTA/Stenting R External Iliac artery    CO LAP INSERTION TUNNELED INTRAPERITONEAL CATHETER N/A 9/21/2018    LAPAROSCOPIC PERITONEAL DIALYSIS CATHETER REPLACEMENT performed by Harpal Thomason MD at [facility]  01/06/2016    UPPER GASTROINTESTINAL ENDOSCOPY  01/29/2017    possible candida, otherwise normal appearing    VASCULAR SURGERY  aprx 2 years ago    2 stents placed, each side of groin       CURRENT MEDICATIONS    Current Outpatient Rx   Medication Sig Dispense Refill    pravastatin (PRAVACHOL) 40 MG tablet Take 1 tablet by mouth daily 90 tablet 3    metoprolol succinate (TOPROL XL) 50 MG extended release tablet Take 1 tablet by mouth daily 90 tablet 3    lisinopril (PRINIVIL;ZESTRIL) 10 MG tablet Take 1 tablet by mouth daily 90 tablet 3    clopidogrel (PLAVIX) 75 MG tablet Take 1 tablet by mouth daily 90 tablet 3    isosorbide mononitrate (IMDUR) 30 MG extended release tablet Take 1 tablet by mouth daily 90 tablet 3    torsemide (DEMADEX) 100 MG tablet Take 1 tablet by mouth daily 90 tablet 1    oxyCODONE-acetaminophen (PERCOCET) 7.5-325 MG per tablet Take 1 tablet by mouth every 6 hours as needed (pain) for up to 30 days.  120 tablet 0    QUEtiapine (SEROQUEL) 50 MG tablet TAKE 1 TABLET BY MOUTH IN THE EVENING 30 tablet 2    cyclobenzaprine (FLEXERIL) 10 MG tablet TAKE 1 TABLET BY MOUTH EVERY 8 HOURS AS NEEDED 90 tablet 2    fluticasone (FLONASE) 50 MCG/ACT nasal spray SHAKE LIQUID AND USE 2 SPRAYS IN EACH NOSTRIL DAILY 48 g 0    aspirin 81 MG chewable tablet Take 1 tablet by mouth daily 30 tablet 3    insulin glargine (BASAGLAR KWIKPEN) 100 UNIT/ML injection pen Inject 30 Units into the skin nightly 15 mL 1    gabapentin (NEURONTIN) 100 MG capsule TAKE 1-2 CAPSULES BY MOUTH THREE TIMES A  capsule 5     MG capsule TAKE 1 CAPSULE BY MOUTH TWICE DAILY 60 capsule 5    Continuous Blood Gluc Sensor (DEXCOM G6 SENSOR) MISC Every 10 days 9 each 3    Continuous Blood Gluc Transmit (DEXCOM G6 TRANSMITTER) MISC 1 each by Does not apply route every 3 months 1 each 3    Continuous Blood Gluc  (DEXCOM G6 ) ADAM 1 each by Does not apply route Daily with lunch 1 each 0    DULoxetine (CYMBALTA) 60 MG extended release capsule TAKE 1 CAPSULE BY MOUTH EVERY DAY 30 capsule 5    traZODone (DESYREL) 150 MG tablet TAKE ONE (1) TABLET BY MOUTH NIGHTLY 30 tablet 10    B Complex-C-Folic Acid (VIRT-CAPS) 1 MG CAPS TAKE 1 CAPSULE BY MOUTH EVERY DAY 90 capsule 1    Calcium Acetate, Phos Binder, 667 MG CAPS TAKE 1 CAPSULE BY MOUTH THREE TIMES DAILY WITH MEALS 90 capsule 3    LINZESS 145 MCG capsule TAKE 1 CAPSULE BY MOUTH EVERY MORNING BEFORE BREAKFAST 30 capsule 10    nitroGLYCERIN (NITROSTAT) 0.4 MG SL tablet DISSOLVE 1 TABLET UNDER THE TONGUE AS NEEDED FOR CHEST PAIN EVERY 5 MINUTES UP TO 3 TIMES. IF NO RELIEF CALL 911. 25 tablet 10    insulin aspart (NOVOLOG FLEXPEN) 100 UNIT/ML injection pen Inject 20 Units into the skin 3 times daily (before meals) 15 pen 5    vitamin D (ERGOCALCIFEROL) 01844 units CAPS capsule TK 1 C PO WEEKLY  11    Tiotropium Bromide-Olodaterol (STIOLTO RESPIMAT) 2.5-2.5 MCG/ACT AERS Inhale 2 puffs into the lungs daily 2 Inhaler 0    Polyethylene Glycol 3350 GRAN       Blood Glucose Monitoring Suppl ADAM USE AS DIRECTED.  1 Device 0    Alcohol Swabs PADS USE AS DIRECTED 300 each 3    albuterol sulfate  (90 Base) MCG/ACT inhaler Inhale 2 puffs into the lungs every 6 hours as needed for Wheezing 1 Inhaler 3    ipratropium-albuterol (DUONEB) 0.5-2.5 (3) MG/3ML SOLN nebulizer solution Inhale 3 mLs into the lungs every 6 hours as needed for Shortness of Breath 360 mL 1    calcium carbonate (TUMS) 500 MG chewable tablet Take 1 tablet by mouth 3 times daily as needed for Heartburn. ALLERGIES    Allergies   Allergen Reactions    Morphine Nausea And Vomiting       FAMILY HISTORY    Family History   Problem Relation Age of Onset    Diabetes Mother     Heart Disease Father     Kidney Disease Sister         stage 4-kidney failure    Cancer Sister     Heart Disease Sister     Obesity Sister     Cancer Sister     Heart Disease Sister     Obesity Sister     Alcohol Abuse Brother        SOCIAL HISTORY    Social History     Socioeconomic History    Marital status:      Spouse name: None    Number of children: None    Years of education: None    Highest education level: None   Occupational History    None   Tobacco Use    Smoking status: Former Smoker     Packs/day: 0.50     Years: 33.00     Pack years: 16.50     Types: Cigarettes     Quit date: 2020     Years since quittin.8    Smokeless tobacco: Never Used    Tobacco comment: \[facility] [place] [facility]\"" quit 2021   Vaping Use    Vaping Use: Never used   Substance and Sexual Activity    Alcohol use: Not Currently     Alcohol/week: 0.0 standard drinks     Comment: occ    Drug use: No    Sexual activity: Yes     Partners: Female     Comment:    Other Topics Concern    None   Social History Narrative    None     Social Determinants of Health     Financial Resource Strain:     Difficulty of Paying Living Expenses: Not on file   Food Insecurity:     Worried About Running Out of Food in the Last Year: Not on file    Louise of Food in the Last Year: Not on file   Transportation Needs:     Lack of Transportation (Medical): Not on file    Lack of Transportation (Non-Medical):  Not on file   Physical Activity:     Days of Exercise per Week: Not on file    Minutes of Exercise per Session: Not on file   Stress:     Feeling of Stress : Not on file   Social Connections:     Frequency of Communication with Friends and Family: Not on file    Frequency of Social Gatherings with Friends and Family: Not on file    Attends Anglican Services: Not on file    Active Member of Clubs or Organizations: Not on file    Attends Club or Organization Meetings: Not on file    Marital Status: Not on file   Intimate Partner Violence:     Fear of Current or Ex-Partner: Not on file    Emotionally Abused: Not on file    Physically Abused: Not on file    Sexually Abused: Not on file   Housing Stability:     Unable to Pay for Housing in the Last Year: Not on file    Number of [place] in the Last Year: Not on file    Unstable Housing in the Last Year: Not on file       REVIEW OF SYSTEMS    10 systems reviewed, pertinent positives per HPI otherwise noted to be negative    PHYSICAL EXAM  Physical Exam  Vitals:    02/21/22 1948   BP: (!) 145/92   Pulse: 112   Resp: (!) 36   Temp:    SpO2: 93%     GENERAL: Patient is chronically ill appearing, obese. Awake and alert. Cooperative. Resting in bed. Moderately distressed due to SOB. HEENT:  Normocephalic, atraumatic. Conjunctiva appear normal. Sclera is non-icteric. External ears are normal.    NECK: Supple with normal ROM. Trachea midline  LUNGS: Equal and symmetric chest rise. Breathing is labored. Lungs are clear bilaterally to auscultation. Without wheezing, rales, or rhonchi. CADIOVASCULAR: Tachycardic rate and rhythm. Normal S1-S2 sounds. No murmurs, rubs, or gallops. Capillary refill is brisk in all 4extremities. Bilateral lower extremities are equal in size, there is +1 bilateral swelling observed. There is no tenderness to palpation. There is no erythema observed or warmth palpated. Left chest wall dialysis access, dressing D&I. GI: Soft, nontender, nondistended with positive bowelsounds. No rebound tenderness, guarding or any peritoneal signs. No masses or hepatosplenomegaly. Rectal exam: mass and tenderness to 6 o'clock position of the anus and erythema, no CASSIDY performed.   MUSCULOSKELETAL:  No gross deformities or trauma noted. Moving allextremities equally and appropriately. Normal ROM. SKIN: Warm/dry. Skin is intact. Norashes/lesions noted. PSYCHIATRIC: Mood and affect appropriate. Speech is clear andarticulate. NEUROLOGIC: Alert and oriented. No focal motor or sensory deficits. LABS  I havereviewed all labs for this visit.    Results for orders placed or performed during the hospital encounter of 02/21/22   CBC with Auto Differential   Result Value Ref Range    WBC 13.1 (H) 4.0 - 11.0 K/uL    RBC 2.98 (L) 4.20 - 5.90 M/uL    Hemoglobin 8.9 (L) 13.5 - 17.5 g/dL    Hematocrit 26.9 (L) 40.5 - 52.5 %    MCV 90.0 80.0 - 100.0 fL    MCH 29.9 26.0 - 34.0 pg    MCHC 33.2 31.0 - 36.0 g/dL    RDW 16.4 (H) 12.4 - 15.4 %    Platelets 731 310 - 297 K/uL    MPV 8.1 5.0 - 10.5 fL    Neutrophils % 85.9 %    Lymphocytes % 6.3 %    Monocytes % 5.7 %    Eosinophils % 1.8 %    Basophils % 0.3 %    Neutrophils Absolute 11.3 (H) 1.7 - 7.7 K/uL    Lymphocytes Absolute 0.8 (L) 1.0 - 5.1 K/uL    Monocytes Absolute 0.8 0.0 - 1.3 K/uL    Eosinophils Absolute 0.2 0.0 - 0.6 K/uL    Basophils Absolute 0.0 0.0 - 0.2 K/uL   Comprehensive Metabolic Panel w/ Reflex to MG   Result Value Ref Range    Sodium 130 (L) 136 - 145 mmol/L    Potassium reflex Magnesium 4.8 3.5 - 5.1 mmol/L    Chloride 89 (L) 99 - 110 mmol/L    CO2 22 21 - 32 mmol/L    Anion Gap 19 (H) 3 - 16    Glucose 234 (H) 70 - 99 mg/dL    BUN 84 (HH) 7 - 20 mg/dL    CREATININE 8.0 (HH) 0.9 - 1.3 mg/dL    GFR Non-African American 7 (A) >60    GFR  9 (A) >60    Calcium 8.4 8.3 - 10.6 mg/dL    Total Protein 6.3 (L) 6.4 - 8.2 g/dL    Albumin 3.8 3.4 - 5.0 g/dL    Albumin/Globulin Ratio 1.5 1.1 - 2.2    Total Bilirubin <0.2 0.0 - 1.0 mg/dL    Alkaline Phosphatase 115 40 - 129 U/L    ALT 7 (L) 10 - 40 U/L    AST 8 (L) 15 - 37 U/L   Troponin   Result Value Ref Range    Troponin 0.15 (H) <0.01 ng/mL   Brain Natriuretic Peptide   Result Value Ref Range    Pro-BNP 43,876 (H) 0 - 124 pg/mL   Lactate, Sepsis   Result Value Ref Range    Lactic Acid, Sepsis 1.2 0.4 - 1.9 mmol/L   Blood Gas, Venous   Result Value Ref Range    pH, Blade 7.304 (L) 7.350 - 7.450    pCO2, Blade 42.8 40.0 - 50.0 mmHg    pO2, Blade 34.4 25.0 - 40.0 mmHg    HCO3, Venous 20.8 (L) 23.0 - 29.0 mmol/L    Base Excess, Blade -5.3 (L) -3.0 - 3.0 mmol/L    O2 Sat, Blade 58 Not Established %    Carboxyhemoglobin 4.1 (H) 0.0 - 1.5 %    MetHgb, Blade 0.5 <1.5 %    TC02 (Calc), Blade 22 Not Established mmol/L    O2 Therapy Unknown    EKG 12 Lead   Result Value Ref Range    Ventricular Rate 99 BPM    Atrial Rate 99 BPM    P-R Interval 176 ms    QRS Duration 92 ms    Q-T Interval 352 ms    QTc Calculation (Bazett) 451 ms    P Axis 90 degrees    R Axis 46 degrees    T Axis 55 degrees    Diagnosis       Normal sinus rhythmPossible Left atrial enlargementBorderline ECGWhen compared with ECG of 01-FEB-2022 02:22,No significant change was found       RADIOLOGY    XR CHEST PORTABLE    Result Date: 2/21/2022  EXAMINATION: ONE XRAY VIEW OF THE CHEST 2/21/2022 5:46 pm COMPARISON: CT chest, 02/01/2022 HISTORY: ORDERING SYSTEM PROVIDED HISTORY: SOB TECHNOLOGIST PROVIDED HISTORY: Reason for exam:->SOB Reason for Exam: sob FINDINGS: TAVR is noted. Left jugular venous catheter terminates over the superior cavoatrial junction. The cardiac silhouette is enlarged. Pulmonary vessels are cephalized. There is no consolidation, pleural effusion or pneumothorax. Vascular congestion. No consolidation. Stable cardiomegaly. ED COURSE/MDM  Patient seen and evaluated. Old records reviewed. Diagnostic testing reviewed and results discussed. I have seen and evaluated this patient with supervising physician: Quintin Palencia MD. We thoroughly discussed the history, physical exam, diagnostic testing, emergency department course, plan and disposition. Slime Mccauley presented to the ED with the above noted complaints.    Arrival vital signs: Afebrile, hemodynamically stable, BP elevated 135/85 well saturated at 96% on room air. Physical exam performed at [id number]: Patient does appear distressed and has labored breathing however breath sounds are clear throughout. He has no reproducible abdominal tenderness to palpation. He did report concern over a sore on his rectum, upon rectal exam at the 6 o'clock position of the anus there is an erythematous and tender area that does feel somewhat indurated. No open or weeping areas no fissure observed. Blood work: Does show leukocytosis is WBC elevated at 13.1, absolute neutrophils elevated at 11.3, absolute lymphocytes low at 0.8. No further differential shift. Stable anemia. Lactic acid levels normal at 1.2. VBG shows pH to be low at 7.30. Sodium is low at 130, potassium is normal.  Chloride low at 89. BUN and creatinine are elevated to 84/8. This is expected due to patient's end-stage renal disease. No transaminitis. Troponin elevated at 0.15, this is been chronically elevated in the past and higher than what it is currently[place] [address]. This has also been elevated in the past, more elevated than most recent. EKG: Normal sinus rhythm. No acute findings. Imaging: Chest x-ray shows vascular congestion. No consolidation. Stable cardiomegaly. CT pelvis shows small 14 mm ovoid fluid collection in the subcutaneous fat of the right perineum unchanged from at least 1/1/2021. Minimal adjacent subcutaneous fat stranding. This could be due to a sebaceous cyst with superimposed infection not excluded. No soft tissue gas or other drainable fluid collection. No acute osseous abnormality.     SEP-1 CORE MEASURE DATA    Classification: sepsis    Amount of fluids ordered: Fluids not given due to ESRD patient with elevated BNP    Time at which sepsis was identified: 1900    Broad-spectrum antibiotics chosen: vancomycin and zosyn based on sepsis order-set for a suspected source of: Skin and Soft Tissue    Repeat lactate level: not indicated initial lactic acid level normal.     On reassessment after fluid resuscitation: No fluids given    Medications given in the ED:   Medications   ondansetron (ZOFRAN) injection 4 mg (4 mg IntraVENous Given 2/21/22 2006)   vancomycin 1500 mg in dextrose 5% 300 mL IVPB (has no administration in time range)   oxyCODONE-acetaminophen (PERCOCET)  MG per tablet 1 tablet (1 tablet Oral Given 2/21/22 1853)   HYDROmorphone (DILAUDID) injection 1 mg (1 mg IntraVENous Given 2/21/22 2002)   piperacillin-tazobactam (ZOSYN) 3,375 mg in dextrose 5 % 50 mL IVPB (mini-bag) (3,375 mg IntraVENous New Bag 2/21/22 2011)      Leander Izaguirre will be admitted for further observation and evaluation of Igor Ann's sepsis, abscess. As I have deemed necessary from their history, physical, and studies, I have considered and evaluated Leander Izaguirre for the following diagnoses: DEEP SPACE INFECTIONS, DEEP VENOUS THROMBOSIS, FERMIN'S GANGRENE, SEPSIS, and TOXIC SHOCK SYNDROME. The total critical care time I independently spent while evaluating and treating this patient was 41 minutes. This excludes time spent doing separately billable procedures. This includes time at the bedside, data interpretation, medication management, obtaining critical history from collateral sources if the patient is unable to provide it directly, and physician consultation. Specifics of interventions taken and potentially life-threatening diagnostic considerations are listed above in the medical decision making. CLINICAL IMPRESSION    1. Sepsis without acute organ dysfunction, due to unspecified organism ([address]    2. [place]-rectal abscess    3. Shortness of breath    4. ESRD on hemodialysis ([facility])    5. Elevated brain natriuretic peptide (BNP) level    6.  Type 2 diabetes mellitus with other skin complication, with long-term current use of insulin ([facility])       DISPOSITION  Admit    Comment: Please note this report has been produced using speech recognition software and may contain errors related to that system including errorsin grammar, punctuation, and spelling, as well as words and phrases that may be inappropriate. If there are any questions or concerns please feel free to contact the dictating provider for clarification.        JAY Mahoney - CNP  02/21/22 3435

## 2022-02-22 DIAGNOSIS — K21.9 GASTROESOPHAGEAL REFLUX DISEASE WITHOUT ESOPHAGITIS: ICD-10-CM

## 2022-02-22 LAB
A/G RATIO: 1.2 (ref 1.1–2.2)
ALBUMIN SERPL-MCNC: 3.4 G/DL (ref 3.4–5)
ALP BLD-CCNC: 98 U/L (ref 40–129)
ALT SERPL-CCNC: 7 U/L (ref 10–40)
ANION GAP SERPL CALCULATED.3IONS-SCNC: 20 MMOL/L (ref 3–16)
AST SERPL-CCNC: 10 U/L (ref 15–37)
BASOPHILS ABSOLUTE: 0.1 K/UL (ref 0–0.2)
BASOPHILS RELATIVE PERCENT: 0.6 %
BILIRUB SERPL-MCNC: 0.3 MG/DL (ref 0–1)
BUN BLDV-MCNC: 88 MG/DL (ref 7–20)
CALCIUM SERPL-MCNC: 8.4 MG/DL (ref 8.3–10.6)
CHLORIDE BLD-SCNC: 91 MMOL/L (ref 99–110)
CO2: 18 MMOL/L (ref 21–32)
CREAT SERPL-MCNC: 9 MG/DL (ref 0.9–1.3)
EKG ATRIAL RATE: 99 BPM
EKG DIAGNOSIS: NORMAL
EKG P AXIS: 90 DEGREES
EKG P-R INTERVAL: 176 MS
EKG Q-T INTERVAL: 352 MS
EKG QRS DURATION: 92 MS
EKG QTC CALCULATION (BAZETT): 451 MS
EKG R AXIS: 46 DEGREES
EKG T AXIS: 55 DEGREES
EKG VENTRICULAR RATE: 99 BPM
EOSINOPHILS ABSOLUTE: 0.2 K/UL (ref 0–0.6)
EOSINOPHILS RELATIVE PERCENT: 1 %
GFR AFRICAN AMERICAN: 7
GFR NON-AFRICAN AMERICAN: 6
GLUCOSE BLD-MCNC: 115 MG/DL (ref 70–99)
GLUCOSE BLD-MCNC: 159 MG/DL (ref 70–99)
GLUCOSE BLD-MCNC: 160 MG/DL (ref 70–99)
GLUCOSE BLD-MCNC: 166 MG/DL (ref 70–99)
GLUCOSE BLD-MCNC: 167 MG/DL (ref 70–99)
HCT VFR BLD CALC: 24.6 % (ref 40.5–52.5)
HEMOGLOBIN: 8 G/DL (ref 13.5–17.5)
LACTIC ACID, SEPSIS: 1.6 MMOL/L (ref 0.4–1.9)
LYMPHOCYTES ABSOLUTE: 0.8 K/UL (ref 1–5.1)
LYMPHOCYTES RELATIVE PERCENT: 4.7 %
MCH RBC QN AUTO: 29.5 PG (ref 26–34)
MCHC RBC AUTO-ENTMCNC: 32.7 G/DL (ref 31–36)
MCV RBC AUTO: 90.3 FL (ref 80–100)
MONOCYTES ABSOLUTE: 0.9 K/UL (ref 0–1.3)
MONOCYTES RELATIVE PERCENT: 5.3 %
NEUTROPHILS ABSOLUTE: 14.4 K/UL (ref 1.7–7.7)
NEUTROPHILS RELATIVE PERCENT: 88.4 %
PDW BLD-RTO: 16.3 % (ref 12.4–15.4)
PERFORMED ON: ABNORMAL
PLATELET # BLD: 272 K/UL (ref 135–450)
PMV BLD AUTO: 7.8 FL (ref 5–10.5)
POTASSIUM REFLEX MAGNESIUM: 5 MMOL/L (ref 3.5–5.1)
RBC # BLD: 2.72 M/UL (ref 4.2–5.9)
SODIUM BLD-SCNC: 129 MMOL/L (ref 136–145)
TOTAL PROTEIN: 6.3 G/DL (ref 6.4–8.2)
TROPONIN: 0.17 NG/ML
TROPONIN: 0.18 NG/ML
VANCOMYCIN RANDOM: 18.4 UG/ML
WBC # BLD: 16.3 K/UL (ref 4–11)

## 2022-02-22 PROCEDURE — 85025 COMPLETE CBC W/AUTO DIFF WBC: CPT

## 2022-02-22 PROCEDURE — 6370000000 HC RX 637 (ALT 250 FOR IP): Performed by: NURSE PRACTITIONER

## 2022-02-22 PROCEDURE — 99222 1ST HOSP IP/OBS MODERATE 55: CPT | Performed by: SURGERY

## 2022-02-22 PROCEDURE — 2700000000 HC OXYGEN THERAPY PER DAY

## 2022-02-22 PROCEDURE — 2580000003 HC RX 258: Performed by: STUDENT IN AN ORGANIZED HEALTH CARE EDUCATION/TRAINING PROGRAM

## 2022-02-22 PROCEDURE — 6360000002 HC RX W HCPCS: Performed by: NURSE PRACTITIONER

## 2022-02-22 PROCEDURE — 93010 ELECTROCARDIOGRAM REPORT: CPT | Performed by: INTERNAL MEDICINE

## 2022-02-22 PROCEDURE — 6370000000 HC RX 637 (ALT 250 FOR IP): Performed by: STUDENT IN AN ORGANIZED HEALTH CARE EDUCATION/TRAINING PROGRAM

## 2022-02-22 PROCEDURE — 36415 COLL VENOUS BLD VENIPUNCTURE: CPT

## 2022-02-22 PROCEDURE — 83605 ASSAY OF LACTIC ACID: CPT

## 2022-02-22 PROCEDURE — 1200000000 HC SEMI PRIVATE

## 2022-02-22 PROCEDURE — 6360000002 HC RX W HCPCS: Performed by: STUDENT IN AN ORGANIZED HEALTH CARE EDUCATION/TRAINING PROGRAM

## 2022-02-22 PROCEDURE — 94761 N-INVAS EAR/PLS OXIMETRY MLT: CPT

## 2022-02-22 PROCEDURE — 2500000003 HC RX 250 WO HCPCS: Performed by: NURSE PRACTITIONER

## 2022-02-22 PROCEDURE — 94660 CPAP INITIATION&MGMT: CPT

## 2022-02-22 PROCEDURE — 81001 URINALYSIS AUTO W/SCOPE: CPT

## 2022-02-22 PROCEDURE — 2580000003 HC RX 258: Performed by: NURSE PRACTITIONER

## 2022-02-22 PROCEDURE — 84484 ASSAY OF TROPONIN QUANT: CPT

## 2022-02-22 PROCEDURE — 80053 COMPREHEN METABOLIC PANEL: CPT

## 2022-02-22 PROCEDURE — 80202 ASSAY OF VANCOMYCIN: CPT

## 2022-02-22 RX ORDER — METRONIDAZOLE 250 MG/1
500 TABLET ORAL EVERY 8 HOURS SCHEDULED
Status: DISCONTINUED | OUTPATIENT
Start: 2022-02-22 | End: 2022-02-25

## 2022-02-22 RX ORDER — ACETAMINOPHEN 325 MG/1
650 TABLET ORAL EVERY 8 HOURS SCHEDULED
Status: DISCONTINUED | OUTPATIENT
Start: 2022-02-22 | End: 2022-03-03 | Stop reason: HOSPADM

## 2022-02-22 RX ORDER — FENTANYL CITRATE 50 UG/ML
25 INJECTION, SOLUTION INTRAMUSCULAR; INTRAVENOUS
Status: DISCONTINUED | OUTPATIENT
Start: 2022-02-22 | End: 2022-03-03 | Stop reason: HOSPADM

## 2022-02-22 RX ORDER — OXYCODONE HYDROCHLORIDE 5 MG/1
10 TABLET ORAL EVERY 6 HOURS PRN
Status: DISCONTINUED | OUTPATIENT
Start: 2022-02-22 | End: 2022-02-28

## 2022-02-22 RX ORDER — PANTOPRAZOLE SODIUM 40 MG/1
TABLET, DELAYED RELEASE ORAL
Qty: 30 TABLET | Refills: 1 | Status: SHIPPED | OUTPATIENT
Start: 2022-02-22 | End: 2022-04-28

## 2022-02-22 RX ORDER — OXYCODONE HYDROCHLORIDE 5 MG/1
5 TABLET ORAL EVERY 6 HOURS PRN
Status: DISCONTINUED | OUTPATIENT
Start: 2022-02-22 | End: 2022-03-03 | Stop reason: HOSPADM

## 2022-02-22 RX ORDER — OXYCODONE HYDROCHLORIDE 5 MG/1
5 TABLET ORAL EVERY 4 HOURS PRN
Status: DISCONTINUED | OUTPATIENT
Start: 2022-02-22 | End: 2022-02-22

## 2022-02-22 RX ADMIN — PRAVASTATIN SODIUM 40 MG: 40 TABLET ORAL at 10:10

## 2022-02-22 RX ADMIN — ACETAMINOPHEN 650 MG: 325 TABLET ORAL at 15:51

## 2022-02-22 RX ADMIN — PIPERACILLIN AND TAZOBACTAM 3375 MG: 3; .375 INJECTION, POWDER, LYOPHILIZED, FOR SOLUTION INTRAVENOUS at 10:00

## 2022-02-22 RX ADMIN — CALCIUM ACETATE 667 MG: 667 CAPSULE ORAL at 12:56

## 2022-02-22 RX ADMIN — GABAPENTIN 100 MG: 100 CAPSULE ORAL at 15:21

## 2022-02-22 RX ADMIN — CLOPIDOGREL BISULFATE 75 MG: 75 TABLET ORAL at 10:10

## 2022-02-22 RX ADMIN — HEPARIN SODIUM 5000 UNITS: 5000 INJECTION INTRAVENOUS; SUBCUTANEOUS at 15:21

## 2022-02-22 RX ADMIN — OXYCODONE AND ACETAMINOPHEN 1 TABLET: 7.5; 325 TABLET ORAL at 10:27

## 2022-02-22 RX ADMIN — GABAPENTIN 100 MG: 100 CAPSULE ORAL at 10:10

## 2022-02-22 RX ADMIN — SODIUM CHLORIDE, PRESERVATIVE FREE 10 ML: 5 INJECTION INTRAVENOUS at 21:18

## 2022-02-22 RX ADMIN — INSULIN GLARGINE 30 UNITS: 100 INJECTION, SOLUTION SUBCUTANEOUS at 21:26

## 2022-02-22 RX ADMIN — INSULIN LISPRO 2 UNITS: 100 INJECTION, SOLUTION INTRAVENOUS; SUBCUTANEOUS at 21:20

## 2022-02-22 RX ADMIN — METRONIDAZOLE 500 MG: 250 TABLET ORAL at 15:20

## 2022-02-22 RX ADMIN — ASPIRIN 81 MG 81 MG: 81 TABLET ORAL at 10:10

## 2022-02-22 RX ADMIN — CALCIUM ACETATE 667 MG: 667 CAPSULE ORAL at 18:12

## 2022-02-22 RX ADMIN — CEFEPIME 1000 MG: 1 INJECTION, POWDER, FOR SOLUTION INTRAMUSCULAR; INTRAVENOUS at 15:38

## 2022-02-22 RX ADMIN — TORSEMIDE 100 MG: 100 TABLET ORAL at 10:11

## 2022-02-22 RX ADMIN — TRAZODONE HYDROCHLORIDE 150 MG: 50 TABLET ORAL at 21:17

## 2022-02-22 RX ADMIN — OXYCODONE AND ACETAMINOPHEN 1 TABLET: 7.5; 325 TABLET ORAL at 03:41

## 2022-02-22 RX ADMIN — GABAPENTIN 100 MG: 100 CAPSULE ORAL at 21:17

## 2022-02-22 RX ADMIN — METRONIDAZOLE 500 MG: 250 TABLET ORAL at 21:17

## 2022-02-22 RX ADMIN — QUETIAPINE FUMARATE 50 MG: 25 TABLET ORAL at 21:17

## 2022-02-22 RX ADMIN — HEPARIN SODIUM 5000 UNITS: 5000 INJECTION INTRAVENOUS; SUBCUTANEOUS at 21:17

## 2022-02-22 RX ADMIN — DULOXETINE HYDROCHLORIDE 60 MG: 60 CAPSULE, DELAYED RELEASE ORAL at 10:10

## 2022-02-22 RX ADMIN — ACETAMINOPHEN 650 MG: 325 TABLET ORAL at 21:17

## 2022-02-22 RX ADMIN — INSULIN LISPRO 3 UNITS: 100 INJECTION, SOLUTION INTRAVENOUS; SUBCUTANEOUS at 12:56

## 2022-02-22 ASSESSMENT — PAIN DESCRIPTION - DESCRIPTORS
DESCRIPTORS: SHARP
DESCRIPTORS: SHARP
DESCRIPTORS: ACHING;BURNING;CONSTANT

## 2022-02-22 ASSESSMENT — PAIN DESCRIPTION - PROGRESSION
CLINICAL_PROGRESSION: NOT CHANGED
CLINICAL_PROGRESSION: NOT CHANGED

## 2022-02-22 ASSESSMENT — PAIN DESCRIPTION - ONSET
ONSET: ON-GOING

## 2022-02-22 ASSESSMENT — PAIN SCALES - GENERAL
PAINLEVEL_OUTOF10: 0
PAINLEVEL_OUTOF10: 8
PAINLEVEL_OUTOF10: 10
PAINLEVEL_OUTOF10: 9
PAINLEVEL_OUTOF10: 10
PAINLEVEL_OUTOF10: 7
PAINLEVEL_OUTOF10: 9
PAINLEVEL_OUTOF10: 10
PAINLEVEL_OUTOF10: 8

## 2022-02-22 ASSESSMENT — PAIN DESCRIPTION - FREQUENCY
FREQUENCY: CONTINUOUS

## 2022-02-22 ASSESSMENT — PAIN DESCRIPTION - PAIN TYPE
TYPE: CHRONIC PAIN
TYPE: CHRONIC PAIN;ACUTE PAIN
TYPE: CHRONIC PAIN

## 2022-02-22 ASSESSMENT — PAIN - FUNCTIONAL ASSESSMENT
PAIN_FUNCTIONAL_ASSESSMENT: PREVENTS OR INTERFERES WITH ALL ACTIVE AND SOME PASSIVE ACTIVITIES
PAIN_FUNCTIONAL_ASSESSMENT: PREVENTS OR INTERFERES WITH ALL ACTIVE AND SOME PASSIVE ACTIVITIES

## 2022-02-22 ASSESSMENT — PAIN DESCRIPTION - LOCATION
LOCATION: HIP;BACK
LOCATION: BACK;HIP
LOCATION: SCROTUM;BACK

## 2022-02-22 NOTE — CONSULTS
Consult placed    Who:Dr. Nadya [place]  Date:2/22/2022,  Time:9:18 AM        Electronically signed by Radha Abreu on 2/22/2022 at 9:18 AM

## 2022-02-22 NOTE — CONSULTS
Pharmacy Note  Vancomycin Consult in the ER    Glendy Dugan is a 46 y.o. male started on Vancomycin for SSTI; consult received from Mark Gray NP to manage therapy. Also receiving the following antibiotics: Zosyn. Allergies:  Morphine     Tmax:     Recent Labs     02/21/22 1810   CREATININE 8.0*       Recent Labs     02/21/22 1810   WBC 13.1*       Estimated Creatinine Clearance: 14 mL/min (A) (based on SCr of 8 mg/dL [facility])). No intake or output data in the 24 hours ending 02/21/22 2049    Wt Readings from Last 1 Encounters:   02/21/22 260 lb (117.9 kg)         Body mass index is 38.4 kg/m². Culture Date      Source                       Results      Loading dose (critically ill or in ICU, require dialysis or renal replacement therapy): Vancomycin 25 mg/kg IVPB x 1 (maximum 3000 mg). Goal Vancomycin trough: 10-15 mcg/mL   Goal Vancomycin AUC: 400-600     Assessment/Plan:  Will initiate Vancomycin with a one time loading dose of 1500 mg x1. If the Hospitalist would like to continue vancomycin upon admission, they will need to re-consult pharmacy. Thank you for the consult.   Tressa Durham, PharmD  2/21/2022 8:50 PM

## 2022-02-22 NOTE — PROGRESS NOTES
MD notified: Onalee Butts increased from . 15 to .17, no additional labs ordered, would you like a repeat in 3 hours?

## 2022-02-22 NOTE — PROGRESS NOTES
Hospitalist Progress Note      PCP: Ximena Lopes MD    Date of Admission: 2/21/2022    Chief Complaint: perianal pain and dyspnea    Hospital Course: In brief this is a 46 yoM with ESRD and TIIDM presenting with perianal pain concerning for cellulitis and dyspnea likely from volume overload. Subjective: pt feels his pain is unchanged from yesterday. He is not having purulent discharge. No fevers.        Medications:  Reviewed    Infusion Medications    dextrose      sodium chloride       Scheduled Medications    cefepime  1,000 mg IntraVENous Q24H    metroNIDAZOLE  500 mg Oral 3 times per day    acetaminophen  650 mg Oral 3 times per day    aspirin  81 mg Oral Daily    calcium acetate  667 mg Oral TID WC    clopidogrel  75 mg Oral Daily    DULoxetine  60 mg Oral Daily    gabapentin  100 mg Oral TID    insulin glargine  30 Units SubCUTAneous Nightly    isosorbide mononitrate  30 mg Oral Daily    lisinopril  10 mg Oral Daily    metoprolol succinate  50 mg Oral Daily    QUEtiapine  50 mg Oral Nightly    torsemide  100 mg Oral Daily    traZODone  150 mg Oral Nightly    linaclotide  145 mcg Oral QAM AC    pravastatin  40 mg Oral Daily    insulin lispro  0-18 Units SubCUTAneous TID WC    insulin lispro  0-9 Units SubCUTAneous Nightly    sodium chloride flush  5-40 mL IntraVENous 2 times per day    heparin (porcine)  5,000 Units SubCUTAneous 3 times per day    vancomycin (VANCOCIN) intermittent dosing (placeholder)   Other RX Placeholder     PRN Meds: oxyCODONE, oxyCODONE, fentanNYL, albuterol sulfate HFA, cyclobenzaprine, ipratropium-albuterol, glucose, glucagon (rDNA), dextrose, sodium chloride flush, sodium chloride, dextrose bolus (hypoglycemia) **OR** dextrose bolus (hypoglycemia)      Intake/Output Summary (Last 24 hours) at 2/22/2022 1542  Last data filed at 2/22/2022 1533  Gross per 24 hour   Intake 120 ml   Output --   Net 120 ml       Physical Exam Performed:    BP (!) 116/59 Pulse 98   Temp 98.7 °F (37.1 °C) (Oral)   Resp 18   Ht 5' 9\" (1.753 m)   Wt 266 lb 12.1 oz (121 kg)   SpO2 98%   BMI 39.39 kg/m²     General appearance: No apparent distress, appears stated age and cooperative. HEENT: Pupils equal, round, and reactive to light. Conjunctivae/corneas clear. Neck: Supple, with full range of motion. No jugular venous distention. Trachea midline. Respiratory:  Normal respiratory effort. Clear to auscultation, bilaterally without Rales/Wheezes/Rhonchi. Cardiovascular: Regular rate and rhythm with normal S1/S2 without murmurs, rubs or gallops. Abdomen: Soft, non-tender, non-distended with normal bowel sounds. Musculoskeletal: No clubbing, cyanosis or edema bilaterally. Full range of motion without deformity. Skin: perianal induration with significant ttp and erythema no purulent discharge or opening noticed. Neurologic:  Neurovascularly intact without any focal sensory/motor deficits. Cranial nerves: II-XII intact, grossly non-focal.  Psychiatric: Alert and oriented, thought content appropriate, normal insight  Capillary Refill: Brisk,3 seconds, normal   Peripheral Pulses: +2 palpable, equal bilaterally       Labs:   Recent Labs     02/21/22 1810 02/22/22  0541   WBC 13.1* 16.3*   HGB 8.9* 8.0*   HCT 26.9* 24.6*    272     Recent Labs     02/21/22 1810 02/22/22  0541   * 129*   K 4.8 5.0   CL 89* 91*   CO2 22 18*   BUN 84* 88*   CREATININE 8.0* 9.0*   CALCIUM 8.4 8.4     Recent Labs     02/21/22 1810 02/22/22  0541   AST 8* 10*   ALT 7* 7*   BILITOT <0.2 0.3   ALKPHOS 115 98     No results for input(s): INR in the last 72 hours.   Recent Labs     02/21/22 1810 02/21/22  2310 02/22/22  0541   TROPONINI 0.15* 0.15* 0.17*       Urinalysis:      Lab Results   Component Value Date    NITRU Negative 09/07/2021    WBCUA 10-20 09/07/2021    BACTERIA 1+ 09/07/2021    RBCUA 3-4 09/07/2021    BLOODU TRACE-LYSED 09/07/2021    SPECGRAV 1.015 09/07/2021    GLUCOSEU 100 09/07/2021       Radiology:  CT PELVIS WO CONTRAST Additional Contrast? None   Final Result   1. Small 14 mm ovoid fluid collection in the subcutaneous fat of the right   perineum unchanged from at least 01/01/2021. Minimal adjacent subcutaneous   fat stranding. This may represent a sebaceous cyst with superimposed   infection not excluded. 2. No soft tissue gas or other drainable fluid collection. 3. No acute osseous abnormality. XR CHEST PORTABLE   Final Result   Vascular congestion. No consolidation. Stable cardiomegaly. Assessment/Plan:    Active Hospital Problems    Diagnosis     Acute on chronic combined systolic and diastolic heart failure ([facility]) [I50.43]      Priority: High    Dyslipidemia [E78.5]      Priority: High    Type 2 diabetes, uncontrolled, with neuropathy ([facility] [address] [E11.40, E11.65]      Priority: High    Essential hypertension [I10]      Priority: Medium    SIRS (systemic inflammatory response syndrome) ([facility]) [R65.10]     ESRD (end stage renal disease) ([facility] [N18.6]     Obesity (BMI 30-39. 9) [E66.9]      Perianal cellulitis w/ sepsis (SIRS criteira WBC 13.1, )  - CT pelvis revealed: small 14 mm ovoid fluid collection in subcutaneous fat of the right perineum unchanged from atleast 01/01/2021. Minimal adjacent subcutaneous fat stranding. This may represent a sebaceous cyst with superimposed infection. No soft tissue gas or other drainable fluid collection. - continue broad Gram negative, positive and anaerobic coverage for now. Unsure if MRSA coverage needed in light of non purulent presentation but will continue for now. - pain regimen adjusted: acetaminophen scheduled, oxycodone PO for breakthrough pain and IV opioids if PO insufficient.  - general surgery following    Dyspnea without hypoxia- likely volume overload from inadequate iHD. Pulmonary congestion on exam and imaging R>L  - continue iHD with nephrology assistance.     TIIDM- a1c 9.4 on 1/12/2022  - continue basal and SSI    Essential HTN  -continue metoprolol and lisinopril     HLD  -continue pravastatin    Elevated troponin, 0.15-->0.17 on admission  -no c/o chest pain  -likely 2/2 demand ischemia r/t ESRD  -trend troponin  -tele monitoring    Acute on chronic CHF, stable  -strict I&O  -daily weights  -continue demadex     Chronic normocytic anemia likely from anemia of chronic disease  -no s/s of bleeding at this time     Anxiety depression  -continue home meds  -mood stable at this time    Obesity  With Body mass index is 87.5 kg/m². Complicating assessment and treatment. Placing patient at risk for multiple co-morbidities as well as early death and contributing to the patient's presentation. Counseled on weight loss          DVT Prophylaxis: heparin  Diet: ADULT DIET;  Regular  Diet NPO  Code Status: Full Code    PT/OT Eval Status: not ordered    Dispo - pending clinical course    Corina Lino MD

## 2022-02-22 NOTE — PLAN OF CARE
Problem: Falls - Risk of:  Goal: Will remain free from falls  Description: Will remain free from falls  Outcome: Ongoing  Goal: Absence of physical injury  Description: Absence of physical injury  Outcome: Ongoing     Problem: Pain:  Description: Pain management should include both nonpharmacologic and pharmacologic interventions.   Goal: Pain level will decrease  Description: Pain level will decrease  2/22/2022 0825 by Fuentes Eden RN  Outcome: Ongoing  2/22/2022 0454 by Zainab Savage RN  Outcome: Ongoing  Note: Pain assessment ongoing and pain control measures carried out   Goal: Control of acute pain  Description: Control of acute pain  Outcome: Ongoing  Goal: Control of chronic pain  Description: Control of chronic pain  Outcome: Ongoing

## 2022-02-22 NOTE — CONSULTS
Department of General Surgery Consult    PATIENT NAME: No Frausto OF BIRTH: 1970    ADMISSION DATE: 2/21/2022  4:42 PM      TODAY'S DATE: 2/22/2022    Reason for Consult:  Perineal cellulitis with possible abscess    Chief Complaint: Perineal pain    Requesting Physician:  Piotr Gaffney NP    HISTORY OF PRESENT ILLNESS:              The patient is a 46 y.o. male who presents with several day h/o worsening shortness of breath along with perineal pain. Pt states he had known that he has a \"cyst\" in the perineal area for ~ a year but had minimal symptoms until now. Pain has been sharp, increasingly severe, with no drainage, no fever/chills. He had his scheduled dialysis yesterday. Denies prior h/o perineal/buttock/rectal abscesses.     Past Medical History:        Diagnosis Date    Ambulatory dysfunction     walker for long distances, SOB with distance    Aortic stenosis     echo 2017    Arthritis     hands and hips    Asthma     Bilateral hilar adenopathy syndrome 6/3/2013    CAD (coronary artery disease)     Dr. Janneth Solorio [facility]) 04/19/2019    EF= 43%    CHF (congestive heart failure) (HCC)     Chronic pain     COPD (chronic obstructive pulmonary disease) ([address]     pulmonology Dr. Hazel Penn    Depression     Diabetes mellitus ([address]     borderline    Difficult intravenous access     Emphysema of lung ([address]     ESRD (end stage renal disease) on dialysis ([address]     MWF    Fear of needles     Gastric ulcer     GERD (gastroesophageal reflux disease)     Heart valve problem     bicuspic valve    Hemodialysis patient ([address]     History of spinal fracture     work incident    Hx of blood clots     Bilateral lower extremities; stents in place    Hyperlipidemia     Hypertension     MI (myocardial infarction) ([address] 2019    has had 9 MIs. 2019 was the last    Neuromuscular disorder ([address]     due to CVA    Numbness and tingling in left arm     from fistula appearing    VASCULAR SURGERY  aprx 2 years ago    2 stents placed, each side of groin       Current Medications:   Current Facility-Administered Medications: albuterol sulfate  (90 Base) MCG/ACT inhaler 2 puff, 2 puff, Inhalation, Q6H PRN  aspirin chewable tablet 81 mg, 81 mg, Oral, Daily  calcium acetate (PHOSLO) capsule 667 mg, 667 mg, Oral, TID WC  clopidogrel (PLAVIX) tablet 75 mg, 75 mg, Oral, Daily  cyclobenzaprine (FLEXERIL) tablet 5 mg, 5 mg, Oral, TID PRN  DULoxetine (CYMBALTA) extended release capsule 60 mg, 60 mg, Oral, Daily  gabapentin (NEURONTIN) capsule 100 mg, 100 mg, Oral, TID  insulin glargine (LANTUS) injection vial 30 Units, 30 Units, SubCUTAneous, Nightly  isosorbide mononitrate (IMDUR) extended release tablet 30 mg, 30 mg, Oral, Daily  ipratropium-albuterol (DUONEB) nebulizer solution 3 mL, 1 vial, Inhalation, Q6H PRN  lisinopril (PRINIVIL;ZESTRIL) tablet 10 mg, 10 mg, Oral, Daily  metoprolol succinate (TOPROL XL) extended release tablet 50 mg, 50 mg, Oral, Daily  oxyCODONE-acetaminophen (PERCOCET) 7.5-325 MG per tablet 1 tablet, 1 tablet, Oral, Q6H PRN  QUEtiapine (SEROQUEL) tablet 50 mg, 50 mg, Oral, Nightly  torsemide (DEMADEX) tablet 100 mg, 100 mg, Oral, Daily  traZODone (DESYREL) tablet 150 mg, 150 mg, Oral, Nightly  linaclotide (LINZESS) capsule 145 mcg, 145 mcg, Oral, QAM AC  pravastatin (PRAVACHOL) tablet 40 mg, 40 mg, Oral, Daily  insulin lispro (HUMALOG) injection vial 0-18 Units, 0-18 Units, SubCUTAneous, TID WC  insulin lispro (HUMALOG) injection vial 0-9 Units, 0-9 Units, SubCUTAneous, Nightly  glucose (GLUTOSE) 40 % oral gel 15 g, 15 g, Oral, PRN  glucagon (rDNA) injection 1 mg, 1 mg, IntraMUSCular, PRN  dextrose 5 % solution, 100 mL/hr, IntraVENous, PRN  sodium chloride flush 0.9 % injection 5-40 mL, 5-40 mL, IntraVENous, 2 times per day  sodium chloride flush 0.9 % injection 5-40 mL, 5-40 mL, IntraVENous, PRN  0.9 % sodium chloride infusion, 25 mL, IntraVENous, PRN  heparin (porcine) injection 5,000 Units, 5,000 Units, SubCUTAneous, 3 times per day  acetaminophen (TYLENOL) tablet 650 mg, 650 mg, Oral, Q6H PRN **OR** acetaminophen (TYLENOL) suppository 650 mg, 650 mg, Rectal, Q6H PRN  dextrose bolus (hypoglycemia) 10% 125 mL, 125 mL, IntraVENous, PRN **OR** dextrose bolus (hypoglycemia) 10% 250 mL, 250 mL, IntraVENous, PRN  piperacillin-tazobactam (ZOSYN) 3,375 mg in dextrose 5 % 50 mL IVPB (mini-bag), 3,375 mg, IntraVENous, Q12H  vancomycin (VANCOCIN) intermittent dosing (placeholder), , Other, RX Placeholder  Prior to Admission medications    Medication Sig Start Date End Date Taking? Authorizing Provider   pravastatin (PRAVACHOL) 40 MG tablet Take 1 tablet by mouth daily 2/10/22  Yes [place] Gum, APRN - CNP   metoprolol succinate (TOPROL XL) 50 MG extended release tablet Take 1 tablet by mouth daily 2/10/22  Yes [place] Gum, APRN - CNP   lisinopril (PRINIVIL;ZESTRIL) 10 MG tablet Take 1 tablet by mouth daily 2/10/22  Yes [place] Gum, APRN - CNP   clopidogrel (PLAVIX) 75 MG tablet Take 1 tablet by mouth daily 2/10/22  Yes [place] Gum, APRN - CNP   isosorbide mononitrate (IMDUR) 30 MG extended release tablet Take 1 tablet by mouth daily 2/10/22  Yes [place] Gum, APRN - CNP   torsemide (DEMADEX) 100 MG tablet Take 1 tablet by mouth daily 2/10/22  Yes Colleen Blackman APRN - CNP   oxyCODONE-acetaminophen (PERCOCET) 7.5-325 MG per tablet Take 1 tablet by mouth every 6 hours as needed (pain) for up to 30 days.  2/7/22 3/9/22 Yes Marizta Luz MD   QUEtiapine (SEROQUEL) 50 MG tablet TAKE 1 TABLET BY MOUTH IN THE EVENING 1/25/22  Yes Maritza Luz MD   cyclobenzaprine (FLEXERIL) 10 MG tablet TAKE 1 TABLET BY MOUTH EVERY 8 HOURS AS NEEDED 1/24/22  Yes Maritza Luz MD   aspirin 81 MG chewable tablet Take 1 tablet by mouth daily 1/18/22  Yes Yovana Werner MD   insulin glargine [facility]) 100 UNIT/ML injection pen Inject 30 Units into the skin nightly 1/17/22  Yes Agustin Roberts MD    MG capsule TAKE 1 CAPSULE BY MOUTH TWICE DAILY 11/12/21  Yes JAY Mcdaniel CNP   DULoxetine (CYMBALTA) 60 MG extended release capsule TAKE 1 CAPSULE BY MOUTH EVERY DAY 10/5/21  Yes Sony Martinez MD   traZODone (DESYREL) 150 MG tablet TAKE ONE (1) TABLET BY MOUTH NIGHTLY 9/29/21  Yes Sony Martinez MD   B Complex-C-Folic Acid (VIRT-CAPS) 1 MG CAPS TAKE 1 CAPSULE BY MOUTH EVERY DAY 9/20/21  Yes Sony Martinez MD   Calcium Acetate, Phos Binder, 667 MG CAPS TAKE 1 CAPSULE BY MOUTH THREE TIMES DAILY WITH MEALS 8/12/21  Yes Sony Martinez MD   LINZESS 145 MCG capsule TAKE 1 CAPSULE BY MOUTH EVERY MORNING BEFORE BREAKFAST 5/25/21  Yes Sony Martinez MD   nitroGLYCERIN (NITROSTAT) 0.4 MG SL tablet DISSOLVE 1 TABLET UNDER THE TONGUE AS NEEDED FOR CHEST PAIN EVERY 5 MINUTES UP TO 3 TIMES. IF NO RELIEF CALL 911. 1/7/21  Yes Sony Martinez MD   insulin aspart (NOVOLOG FLEXPEN) 100 UNIT/ML injection pen Inject 20 Units into the skin 3 times daily (before meals) 10/12/20  Yes Sony Martinez MD   vitamin D (ERGOCALCIFEROL) 38957 units CAPS capsule TK 1 C PO WEEKLY 6/2/19  Yes Historical Provider, MD   Tiotropium Bromide-Olodaterol (STIOLTO RESPIMAT) 2.5-2.5 MCG/ACT AERS Inhale 2 puffs into the lungs daily 5/21/19  Yes Judi Childs MD   Polyethylene Glycol 3350 GRAN  5/2/18  Yes Historical Provider, MD   albuterol sulfate  (90 Base) MCG/ACT inhaler Inhale 2 puffs into the lungs every 6 hours as needed for Wheezing 11/8/17  Yes Violet Garza MD   ipratropium-albuterol (DUONEB) 0.5-2.5 (3) MG/3ML SOLN nebulizer solution Inhale 3 mLs into the lungs every 6 hours as needed for Shortness of Breath 10/15/17  Yes Jonathan Pitts MD   calcium carbonate (TUMS) 500 MG chewable tablet Take 1 tablet by mouth 3 times daily as needed for Heartburn.    Yes Historical Provider, MD   fluticasone (FLONASE) 50 MCG/ACT nasal spray SHAKE LIQUID AND USE 2 SPRAYS IN [facility] NOSTRIL DAILY 1/19/22   Roc Landa MD   gabapentin (NEURONTIN) 100 MG capsule TAKE 1-2 CAPSULES BY MOUTH THREE TIMES A DAY 11/29/21 2/10/22  Roc Landa MD   Continuous Blood Gluc Sensor (DEXCOM G6 SENSOR) MISC Every 10 days 10/5/21   Roc Landa MD   Continuous Blood Gluc Transmit (DEXCOM G6 TRANSMITTER) MISC 1 each by Does not apply route every 3 months 10/5/21   Roc Landa MD   Continuous Blood Gluc  ([address]) [address] 1 each by Does not apply route Daily with lunch 10/5/21   Roc Landa MD   Blood Glucose Monitoring Suppl ADAM USE AS DIRECTED. 4/25/18   Lizzette Cowan MD   Alcohol Swabs PADS USE AS DIRECTED 4/25/18   Lizzette Cowan MD        Allergies:  Morphine    Social History:   TOBACCO:   reports that he quit smoking about 21 months ago. His smoking use included cigarettes. He has a 16.50 pack-year smoking history. He has never used smokeless tobacco.  ETOH:   reports previous alcohol use. DRUGS:   reports no history of drug use. OCCUPATION:    Patient currently lives with family      Family History:        Problem Relation Age of Onset    Diabetes Mother     Heart Disease Father     Kidney Disease Sister         stage 4-kidney failure    Cancer Sister     Heart Disease Sister     Obesity Sister     Cancer Sister     Heart Disease Sister     Obesity Sister     Alcohol Abuse Brother        REVIEW OF SYSTEMS:  CONSTITUTIONAL:  positive for  fatigue and malaise  HEENT:  negative  RESPIRATORY:  negative  CARDIOVASCULAR:  negative  GASTROINTESTINAL:  negative   GENITOURINARY:  negative  HEMATOLOGIC/LYMPHATIC:  negative  NEUROLOGICAL:  Negative  * All other ROS reviewed and negative. PHYSICAL EXAM:  VITALS:  BP (!) 102/48   Pulse 95   Temp 97.3 °F (36.3 °C) (Oral)   Resp 20   Ht 5' 9\" (1.753 m)   Wt 266 lb 12.1 oz (121 kg)   SpO2 96%   BMI 39.39 kg/m²   24HR INTAKE/OUTPUT:    No intake/output data recorded.   No intake/output data recorded. CONSTITUTIONAL:  alert, mild distress and morbidly obese  EYES:  PERRL, sclera clear  ENT:  Normocephalic,atraumatic, without obvious abnormality  NECK:  supple, symmetrical, trachea midline  LUNGS: Resp effort easy and unlabored,    CARDIOVASCULAR:  NO JVD,  ABDOMEN:   ,  , soft, non-distended,  , involuntary guarding absent, no masses palpated and    MUSCULOSKELETAL: No clubbing or cyanosis, 0+ pitting edema lower extremities  NEUROLOGIC:  Mental Status Exam:  Level of Alertness:   awake  PSYCHIATRIC:   person, place, time  SKIN:  Body Locations:  Anus/perineum:  abnormal - mild erythema and induration between base of scrotum and anal verge, overall fairly soft w/o obvious underlying abscess, no drainage; tender to minimal palpation    DATA:    CBC:   Recent Labs     02/21/22 1810 02/22/22  0541   WBC 13.1* 16.3*   HGB 8.9* 8.0*   HCT 26.9* 24.6*    272     BMP:    Recent Labs     02/21/22 1810 02/22/22  0541   * 129*   K 4.8 5.0   CL 89* 91*   CO2 22 18*   BUN 84* 88*   CREATININE 8.0* 9.0*   GLUCOSE 234* 167*     Hepatic:   Recent Labs     02/21/22 1810 02/22/22  0541   AST 8* 10*   ALT 7* 7*   BILITOT <0.2 0.3   ALKPHOS 115 98     Mag:    No results for input(s): MG in the last 72 hours. Phos:   No results for input(s): PHOS in the last 72 hours. INR: No results for input(s): INR in the last 72 hours. Radiology Review: Images personally reviewed by me. CT OF THE PELVIS WITHOUT CONTRAST 2/21/2022 6:25 pm       TECHNIQUE:   CT of the pelvis was performed without the administration of intravenous   contrast.  Multiplanar reformatted images are provided for review.    Adjustment of mA and/or kV according to patient size was utilized. Arnette Lynda   modulation, iterative reconstruction, and/or weight based adjustment of the   mA/kV was utilized to reduce the radiation dose to as low as reasonably   achievable.       COMPARISON:   CT abdomen and pelvis angiogram 01/01/2021.     HISTORY:   ORDERING SYSTEM PROVIDED HISTORY: abscess darrick-rectal area, diabetic,   concerned about fourniers   TECHNOLOGIST PROVIDED HISTORY:   Reason for exam:->abscess darrick-rectal area, diabetic, concerned about   fourniers   Additional Contrast?->None   Decision Support Exception - unselect if not a suspected or confirmed   emergency medical condition->Emergency Medical Condition (MA)       FINDINGS:   There is a small ovoid fluid collection in the subcutaneous fat of the right   perineum measuring 14 mm on axial image 130.  This is unchanged from   01/01/2021.  Minimal adjacent subcutaneous fat stranding.  No soft tissue   gas.  No other drainable fluid collection identified.  Mild bilateral   inguinal lymphadenopathy.  The visualized musculature is unremarkable. Atherosclerotic vascular calcifications are seen.       The urinary bladder is normal in appearance.  The uterus is unremarkable. The visualized bowel is unremarkable.  No free fluid.  No pelvic   lymphadenopathy.       No acute osseous abnormality.           Impression   1. Small 14 mm ovoid fluid collection in the subcutaneous fat of the right   perineum unchanged from at least 01/01/2021.  Minimal adjacent subcutaneous   fat stranding.  This may represent a sebaceous cyst with superimposed   infection not excluded. 2. No soft tissue gas or other drainable fluid collection. 3. No acute osseous abnormality. IMPRESSION/RECOMMENDATIONS:    Perineal cellulitis - the exam and CT findings correspond to suggest there is mainly cellulitis w/o significant associated abscess that would benefit from direct surgical I&D. Rather, he may just need to continue broad, strong antibiotic coverage and see if the cellulitis will begin to resolve. If, however, his symptoms worsen rather than improve, we may consider repeat CT and/or surgical I&D.      - will follow closely w/ you   - ok to eat/drink today, will make NPO tonight just in case    Electronically signed by Suma Ramachandran MD     [address] [facility]  [id number]

## 2022-02-22 NOTE — CONSULTS
Thank you to requesting provider: JAY Henson , for asking us to see Yaritza Sheriff  Reason for consultation:   ESRD  Chief Complaint:  Shortness of breath and perineal pain     History of Presenting Illness      45 y/o ESRD patient on dialysis MWF, he went to dialysis yesterday and his weight was 122 kg with a target of 116 kg, he did get 2 kg UF. He came to the ER with shortness of breath that did not improve with dialysis and perineal pain. No fever/chills. He had a fistula which was ligated due to steal and now he is [facility], [place], operated by [facility] dependent. Failed PD. Past Medical/Surgical History      Active Ambulatory Problems     Diagnosis Date Noted    Asthma-COPD overlap syndrome ([address] 05/14/2013    CAD S/P percutaneous coronary angioplasty 05/14/2013    PVD (peripheral vascular disease) ([address] 05/14/2013    Bicuspid aortic valve 06/03/2013    Bilateral hilar adenopathy syndrome 06/03/2013    Claudication in peripheral vascular disease ([address] 06/26/2013    Essential hypertension 11/14/2013    Diabetic neuropathy ([address] 11/14/2013    Type 2 diabetes, uncontrolled, with neuropathy ([address] 03/04/2014    Passive smoke exposure 05/30/2014    Depression with anxiety 06/05/2014    Pneumonia of right upper lobe due to infectious organism 03/28/2015    Obesity (BMI 30-39. 9)     ZAINAB on CPAP 02/11/2016    Degeneration of lumbar or lumbosacral intervertebral disc 03/18/2016    Lumbar radiculopathy 03/18/2016    Lumbosacral spondylosis without myelopathy 74/53/1604    Biliary colic 52/25/7008    Symptomatic cholelithiasis 01/04/2017    Gastroparesis due to DM ([address]     Angina, class IV (HCC)     Dyspnea     Dyslipidemia     Acute on chronic combined systolic and diastolic heart failure ([address]     Ischemic cardiomyopathy     Tobacco abuse 05/21/2017    CVA (cerebral vascular accident) ([address] 05/21/2017    History of CVA (cerebrovascular accident)     Type 2 diabetes mellitus without complication, without long-term current use of insulin ([facility])     ZAINAB (obstructive sleep apnea)     Pleural effusion     Chronic anemia     Nonrheumatic aortic valve stenosis     Mucus plugging of bronchi     Hemodialysis-associated hypotension 11/22/2017    ESRD (end stage renal disease) ([address] 11/22/2017    Hypotension due to drugs 11/25/2017    Acute diastolic CHF (congestive heart failure) ([address] 03/18/2018    Neuromuscular disorder ([facility])     Renovascular hypertension     Mixed hyperlipidemia     Cigarette nicotine dependence in remission     Pulmonary edema 11/09/2018    Fluid overload 11/09/2018    Anemia of chronic disease 11/12/2018    SOB (shortness of breath) on exertion 12/24/2018    Steal syndrome of dialysis vascular access ([facility])     Chronic, continuous use of opioids 04/15/2019    Chronic bronchitis ([address] 05/21/2019    Nasal congestion 05/21/2019    Hypercholesteremia 07/30/2019    Bradycardia 10/19/2019    S/P TAVR (transcatheter aortic valve replacement) 10/19/2019    Syncope and collapse 10/19/2019    Atrial fibrillation ([facility])     Bilateral leg weakness 12/04/2020    GBS (Guillain-Russell syndrome) ([facility])     Sinus pause     Weakness of both lower extremities 12/30/2020    Sinus bradycardia 12/30/2020    Ataxia     Peripheral vascular occlusive disease ([address] 01/02/2021    Cellulitis of right foot 01/29/2021    Iliac artery occlusion, right ([facility])     Cellulitis and abscess of hand 04/24/2021    Type 2 diabetes mellitus with hyperglycemia ([address] 06/27/2021    Acute encephalopathy 06/27/2021    Acute hypoxemic respiratory failure ([address] 08/12/2021    Acute cerebrovascular accident (CVA) ([address] 09/07/2021    Speech problem     Urinary tract infection with hematuria     Respiratory failure with hypoxia ([address] 11/29/2021    Acute respiratory failure with hypercapnia ([address] 11/30/2021    Acute pulmonary edema ([address] 11/30/2021    Obesity, Class II, BMI 35-39.9 11/30/2021    Grade II diastolic dysfunction 92/09/1266    Shock circulatory ([address] 11/30/2021    Smoker 11/30/2021    Normocytic normochromic anemia 11/30/2021    NSTEMI (non-ST elevated myocardial infarction) ([address] 01/12/2022     Resolved Ambulatory Problems     Diagnosis Date Noted    Sepsis ([address] 12/25/2012    CHF, acute on chronic (HCC) 05/12/2013    Chronic dCHF (grade 2 LVDD) 05/14/2013    Leukocytosis 05/14/2013    PVD (peripheral vascular disease) ([address] 06/26/2013    Abscess 07/22/2013    Sebaceous cyst 07/22/2013    Wound infection after surgery 08/30/2013    Postop check 08/30/2013    Insomnia 06/05/2014    Sore throat 11/03/2014    Sinusitis, acute 02/05/2015    Abscess, abdomen 03/10/2015    Pre-operative cardiovascular examination     Elevated troponin     Non morbid obesity due to excess calories     Tobacco abuse 03/25/2020    Pain     ESRD on peritoneal dialysis ([address] 11/09/2018    Preoperative cardiovascular examination 04/04/2019    Syncope 10/19/2019     Past Medical History:   Diagnosis Date    Ambulatory dysfunction     Aortic stenosis     Arthritis     Asthma     CAD (coronary artery disease)     Cardiomyopathy ([address] 04/19/2019    Chronic pain     COPD (chronic obstructive pulmonary disease) ([address]     Depression     Diabetes mellitus (HCC)     Difficult intravenous access     Emphysema of lung ([address]     ESRD (end stage renal disease) on dialysis ([address]     Fear of needles     Gastric ulcer     GERD (gastroesophageal reflux disease)     Heart valve problem     Hemodialysis patient ([address]     History of spinal fracture     Hx of blood clots     Hyperlipidemia     Hypertension     MI (myocardial infarction) ([address] 2019    Numbness and tingling in left arm     Pneumonia     PONV (postoperative nausea and vomiting)     Prolonged emergence from general anesthesia     Sleep apnea     Stroke ([address]     TIA (transient ischemic attack)     Unspecified diseases of blood and blood-forming organs          Review of Systems     Constitutional:  No weight loss, no fever/chills  Eyes:  No eye pain, no eye redness  Cardiovascular:  No chest pain, no worsening of edema  Respiratory:  No hemoptysis, no stridor  Gastrointestinal:  No blood in stool, no n/v, no diarrhea  Genitoruinary:  No hematuria, no difficulty with urination  Musculoskeletal:  No joint swelling, no redness  Integumentary:  No Rash, no itching  Neurological:  No focal weakness, No new sensory deficit  Psychiatric:  No depression, no confusion  Endocrine:  No polyuria, no polydipsia       Medications      Reviewed in EMR     Allergies     Morphine      Family History       Negative for Kidney Disease    Social History      Social History     Socioeconomic History    Marital status:      Spouse name: None    Number of children: None    Years of education: None    Highest education level: None   Occupational History    None   Tobacco Use    Smoking status: Former Smoker     Packs/day: 0.50     Years: 33.00     Pack years: 16.50     Types: Cigarettes     Quit date: 2020     Years since quittin.8    Smokeless tobacco: Never Used    Tobacco comment: States quit 2021   Vaping Use    Vaping Use: Never used   Substance and Sexual Activity    Alcohol use: Not Currently     Alcohol/week: 0.0 standard drinks     Comment: occ    Drug use: No    Sexual activity: Yes     Partners: Female     Comment:    Other Topics Concern    None   Social History Narrative    None     Social Determinants of Health     Financial Resource Strain:     Difficulty of Paying Living Expenses: Not on file   Food Insecurity:     Worried About Running Out of Food in the Last Year: Not on file    Louise of Food in the Last Year: Not on file   Transportation Needs:     Lack of Transportation (Medical): Not on file    Lack of Transportation (Non-Medical):  Not on file   Physical Activity:     Days of Exercise per Week: Not on file    Minutes of Exercise per Session: Not on file   Stress:     Feeling of Stress : Not on file   Social Connections:     Frequency of Communication with Friends and Family: Not on file    Frequency of Social Gatherings with Friends and Family: Not on file    Attends [employer] Services: Not on file    Active Member of Clubs or Organizations: Not on file    Attends Club or Organization Meetings: Not on file    Marital Status: Not on file   Intimate Partner Violence:     Fear of Current or Ex-Partner: Not on file    Emotionally Abused: Not on file    Physically Abused: Not on file    Sexually Abused: Not on file   Housing Stability:     Unable to Pay for Housing in the Last Year: Not on file    Number of [place] in the Last Year: Not on file    Unstable Housing in the Last Year: Not on file       Physical Exam     Blood pressure (!) 116/59, pulse 98, temperature 98.7 °F (37.1 °C), temperature source Oral, resp. rate 18, height 5' 9\" (1.753 m), weight 266 lb 12.1 oz (121 kg), SpO2 98 %. General:  NAD, A+Ox3, ill-appearing  HEENT:  PERRL, EOMI  Neck:  Supple, normal range of movement  Chest:  No wheezing, no distress, on 2 liters NC   CV:  Tachycardic, no rub   Abdomen:  NTND, soft, +BS, no hepatosplenomegaly  Extremities:  + peripheral edema  Neurological:  Moving all four extremities, CN II-XII grossly intact  Lymphatics:  No palpable lymph nodes  Skin:  No rash, no jaundice  Psychiatric:  Flat affect, somnolent  Access:  [facility]     Data     Recent Labs     02/21/22 1810 02/22/22  0541   WBC 13.1* 16.3*   HGB 8.9* 8.0*   HCT 26.9* 24.6*   MCV 90.0 90.3    272     Recent Labs     02/21/22 1810 02/22/22  0541   * 129*   K 4.8 5.0   CL 89* 91*   CO2 22 18*   GLUCOSE 234* 167*   BUN 84* 88*   CREATININE 8.0* 9.0*   LABGLOM 7* 6*   GFRAA 9* 7*       Assessment:    ESRD:  On dialysis MWF at [facility]. Failed PD. Not transplant candidate.   Had fistula ligated due to dialysis associated steal.  HD yesterday with 2 kg UF    Anemia of chronic disease-CKD:  - Hb is below target, on DAVON at the dialysis unit    Hyponatremia:  Due to volume overload in setting of ESRD     Perineal Pain:  Surgery following. Cellulitis with no overt abscess noted on CT. On broad spectrum IV antibiotics and pain medications     Plan:    Agree with antibiotics  Monitor symptoms to see if cellulitis and pain respond   Dialysis arranged for inpatient tomorrow, UF as tolerated.   He will benefit from daily dialysis while he is admitted     Thank you for asking us to participate in the management of your patient, please do not hesitate to contact me for any concerns regarding my recommendations as outlined above.    -----------------------------  Christiana Bartlett M.D.   [facility]

## 2022-02-22 NOTE — CONSULTS
Consult placed    Who:Dr. Calixto Oar  Date:2/22/2022,  Time:10:00 AM        Electronically signed by Jacob Zhou on 2/22/2022 at 10:00 AM

## 2022-02-22 NOTE — H&P
Hospital Medicine History & Physical      PCP: Carli Palacio MD    Date of Admission: 2/21/2022    Date of Service: Pt seen/examined on 2/21/2022 and Admitted to Inpatient with expected LOS greater than two midnights due to medical therapy. Chief Complaint:  Shortness of breath and buttock pain    History Of Present Illness:      46 y.o. male, with PMH of ESRD, CHF, HTN, HLD, DM and obesity, who presented to [facility] with shortness of breath and buttock pain. History obtained from the patient and review of EMR. The patient stated earlier this morning he began to experience shortness of breath. He does have end-stage renal disease and receives HD on [facility]. His nephrologist is Dr. Marylin Rojas. The patient stated he went to dialysis this morning and was only able to tolerate roughly 30 minutes of the run due to worsening shortness of breath and mild chest tightness. He stated his shortness of breath continued throughout the day so he went to the emergency room for further evaluation. The patient stated he does follow with Dr. Faith Velázquez from pulmonology and believes he needs oxygen at home, but does not have any at this time. Upon arrival to the emergency room the patient was 97% on room air. Nephrology has been consulted. The patient also stated that he has pain in the center of his butt. He stated he has known that he has had an abscess there for a long time, but it has not bothered him much. However, the patient stated for the last 3 days he feels like it is getting bigger and it is becoming more painful to the point he cannot lie on his back. In the emergency room a CT pelvis was obtained that revealed a small14 mm ovoid fluid collection in subcutaneous fat of the right perineum unchanged from atleast 01/01/2021. Minimal adjacent subcutaneous fat stranding. This may represent a sebaceous cyst with superimposed infection. No soft tissue gas or other drainable fluid collection. The patient was started on vancomycin and Zosyn. He will be admitted for further evaluation and treatment. General surgery has been consulted. The patient denied any other associated symptoms as well as any aggravating and/or alleviating factors. At the time of this assessment, the patient was resting comfortably in bed. He currently denies any chest pain, back pain, abdominal pain, shortness of breath, numbness, tingling, N/V/C/D, fever and/or chills.      Past Medical History:          Diagnosis Date    Ambulatory dysfunction     walker for long distances, SOB with distance    Aortic stenosis     echo 2017    Arthritis     hands and hips    Asthma     Bilateral hilar adenopathy syndrome 6/3/2013    CAD (coronary artery disease)     Dr. Nu Velasquez [facility]) 04/19/2019    EF= 43%    CHF (congestive heart failure) ([facility])     Chronic pain     COPD (chronic obstructive pulmonary disease) ([address]     pulmonology Dr. Dustin Jarvis    Depression     Diabetes mellitus ([address]     borderline    Difficult intravenous access     Emphysema of lung ([address]     ESRD (end stage renal disease) on dialysis ([address]     MWF    Fear of needles     Gastric ulcer     GERD (gastroesophageal reflux disease)     Heart valve problem     bicuspic valve    Hemodialysis patient ([address]     History of spinal fracture     work incident    Hx of blood clots     Bilateral lower extremities; stents in place    Hyperlipidemia     Hypertension     MI (myocardial infarction) ([address] 2019    has had 9 MIs. 2019 was the last    Neuromuscular disorder ([address]     due to CVA    Numbness and tingling in left arm     from fistula    Pneumonia     PONV (postoperative nausea and vomiting)     Prolonged emergence from general anesthesia     States requires more medication than most people    Sleep apnea     Uses CPAP    Stroke ([address]     7mm thalamic cva 2017 deficts left side, left side weakness    TIA (transient ischemic attack)     Unspecified diseases of blood and blood-forming organs      Past Surgical History:          Procedure Laterality Date    AORTIC VALVE REPLACEMENT N/A 10/15/2019    TRANSCATHETER AORTIC VALVE REPLACEMENT FEMORAL APPROACH performed by Padmini Woodard MD at [address] Right 7/2/2019    PERITONEAL DIALYSIS CATHETER REMOVAL performed by Angelika Soto MD at [facility] COLONOSCOPY  2/29/2015    WNL    CORONARY ANGIOPLASTY WITH STENT PLACEMENT  05/26/15    CYST REMOVAL  08/14/2013    EXCISION CYSTS, NECK X2 AND ABDOMINAL benign    DIAGNOSTIC CARDIAC CATH LAB PROCEDURE      DIALYSIS FISTULA CREATION Left 10/30/2017    LEFT BRACHIAL CEPHALIC FISTULA    DIALYSIS FISTULA CREATION Left 3/27/2019    LIGATION  AV FISTULA performed by Jeannie Johnson MD at [address] [place], COLON, DIAGNOSTIC      OTHER SURGICAL HISTORY  02/01/2017    laparoscopic cholecystectomy with intraoperative cholangiogram    OTHER SURGICAL HISTORY  2018    PORT PLACEMENT  - vas cath    OTHER SURGICAL HISTORY Bilateral 06/26/2018    laprascopic peritoneal dialysis catheter placement    OTHER SURGICAL HISTORY Right 09/2018    peritoneal dialysis port placed on right side of abdomen    OTHER SURGICAL HISTORY  05/28/2019    PTA/Stenting R External Iliac artery    [place] LAP INSERTION TUNNELED INTRAPERITONEAL CATHETER N/A 9/21/2018    LAPAROSCOPIC PERITONEAL DIALYSIS CATHETER REPLACEMENT performed by Angelika Soto MD at [place] [address]  01/06/2016    UPPER GASTROINTESTINAL ENDOSCOPY  01/29/2017    possible candida, otherwise normal appearing    VASCULAR SURGERY  aprx 2 years ago    2 stents placed, each side of groin     Medications Prior to Admission:      Prior to Admission medications    Medication Sig Start Date End Date Taking?  Authorizing Provider   pravastatin (PRAVACHOL) 40 MG tablet Take 1 tablet by mouth daily 2/10/22   Jackqulyn Pellet, APRN - CNP   metoprolol succinate (TOPROL XL) 50 MG extended release tablet Take 1 tablet by mouth daily 2/10/22   Jackqulyn Pellet, APRN - CNP   lisinopril (PRINIVIL;ZESTRIL) 10 MG tablet Take 1 tablet by mouth daily 2/10/22   Jackqulyn Pellet, APRN - CNP   clopidogrel (PLAVIX) 75 MG tablet Take 1 tablet by mouth daily 2/10/22   Jackqulyn Pellet, APRN - CNP   isosorbide mononitrate (IMDUR) 30 MG extended release tablet Take 1 tablet by mouth daily 2/10/22   Jackqulyn Pellet, APRN - CNP   torsemide (DEMADEX) 100 MG tablet Take 1 tablet by mouth daily 2/10/22   Jackqulyn Pellet, APRN - CNP   oxyCODONE-acetaminophen (PERCOCET) 7.5-325 MG per tablet Take 1 tablet by mouth every 6 hours as needed (pain) for up to 30 days.  2/7/22 3/9/22  Laci Sales MD   QUEtiapine (SEROQUEL) 50 MG tablet TAKE 1 TABLET BY MOUTH IN THE EVENING 1/25/22   Laci Sales MD   cyclobenzaprine (FLEXERIL) 10 MG tablet TAKE 1 TABLET BY MOUTH EVERY 8 HOURS AS NEEDED 1/24/22   Laci Sales MD   fluticasone [facility]) 50 MCG/ACT nasal spray SHAKE LIQUID AND USE 2 SPRAYS IN [facility] NOSTRIL DAILY 1/19/22   Laci Sales MD   aspirin 81 MG chewable tablet Take 1 tablet by mouth daily 1/18/22   Luis Alberto Rajput MD   insulin glargine [facility]) 100 UNIT/ML injection pen Inject 30 Units into the skin nightly 1/17/22   Luis Alberto Rajput MD   gabapentin (NEURONTIN) 100 MG capsule TAKE 1-2 CAPSULES BY MOUTH THREE TIMES A DAY 11/29/21 2/10/22  Laci Sales MD    MG capsule TAKE 1 CAPSULE BY MOUTH TWICE DAILY 11/12/21   [place] Pain, JAY - ILAN   Continuous Blood Gluc Sensor (DEXCOM G6 SENSOR) MISC Every 10 days 10/5/21   Laci Sales MD   Continuous Blood Gluc Transmit (DEXCOM G6 TRANSMITTER) MISC 1 each by Does not apply route every 3 months 10/5/21   Laci Sales MD   Continuous Blood Gluc  ([address]) [address] 1 each by Does not apply route Daily with lunch 10/5/21   Laci Sales, MD   DULoxetine (CYMBALTA) 60 MG extended release capsule TAKE 1 CAPSULE BY MOUTH EVERY DAY 10/5/21   Froy Izaguirre MD   traZODone (DESYREL) 150 MG tablet TAKE ONE (1) TABLET BY MOUTH NIGHTLY 9/29/21   Froy Izaguirre MD   B Complex-C-Folic Acid (VIRT-CAPS) 1 MG CAPS TAKE 1 CAPSULE BY MOUTH EVERY DAY 9/20/21   Froy Izaguirre MD   Calcium Acetate, Phos Binder, 667 MG CAPS TAKE 1 CAPSULE BY MOUTH THREE TIMES DAILY WITH MEALS 8/12/21   Froy Izaguirre MD   LINZESS 145 MCG capsule TAKE 1 CAPSULE BY MOUTH EVERY MORNING BEFORE BREAKFAST 5/25/21   Froy Izaguirre MD   nitroGLYCERIN (NITROSTAT) 0.4 MG SL tablet DISSOLVE 1 TABLET UNDER THE TONGUE AS NEEDED FOR CHEST PAIN EVERY 5 MINUTES UP TO 3 TIMES. IF NO RELIEF CALL 911. 1/7/21   Froy Izaguirre MD   insulin aspart (NOVOLOG FLEXPEN) 100 UNIT/ML injection pen Inject 20 Units into the skin 3 times daily (before meals) 10/12/20   Froy Izaguirre MD   vitamin D (ERGOCALCIFEROL) 88373 units CAPS capsule TK 1 C PO WEEKLY 6/2/19   Historical Provider, MD   Tiotropium Bromide-Olodaterol (STIOLTO RESPIMAT) 2.5-2.5 MCG/ACT AERS Inhale 2 puffs into the lungs daily 5/21/19   Ramesh Guzman MD   Polyethylene Glycol [address]  5/2/18   Historical Provider, MD   Blood Glucose Monitoring Suppl ADAM USE AS DIRECTED. 4/25/18   Alycia Huff MD   Alcohol Swabs PADS USE AS DIRECTED 4/25/18   Alycia Huff MD   albuterol sulfate  (90 Base) MCG/ACT inhaler Inhale 2 puffs into the lungs every 6 hours as needed for Wheezing 11/8/17   Patsy Segundo MD   ipratropium-albuterol (DUONEB) 0.5-2.5 (3) MG/3ML SOLN nebulizer solution Inhale 3 mLs into the lungs every 6 hours as needed for Shortness of Breath 10/15/17   Oswald Sepulveda MD   calcium carbonate (TUMS) 500 MG chewable tablet Take 1 tablet by mouth 3 times daily as needed for Heartburn.     Historical Provider, MD     Allergies:  Morphine    Social History:      The patient currently lives at home    TOBACCO:   reports that he content appropriate, normal insight  Capillary Refill: Brisk,3 seconds, normal  Peripheral Pulses: +2 palpable, equal bilaterally     Labs:     Recent Labs     02/21/22  1810   WBC 13.1*   HGB 8.9*   HCT 26.9*        Recent Labs     02/21/22  1810   *   K 4.8   CL 89*   CO2 22   BUN 84*   CREATININE 8.0*   CALCIUM 8.4     Recent Labs     02/21/22  1810   AST 8*   ALT 7*   BILITOT <0.2   ALKPHOS 115     Recent Labs     02/21/22 1810   TROPONINI 0.15*     Urinalysis:      Lab Results   Component Value Date    NITRU Negative 09/07/2021    WBCUA 10-20 09/07/2021    BACTERIA 1+ 09/07/2021    RBCUA 3-4 09/07/2021    BLOODU TRACE-LYSED 09/07/2021    SPECGRAV 1.015 09/07/2021    GLUCOSEU 100 09/07/2021     Radiology:     CXR: I have reviewed the CXR with the following interpretation: vascular congestion    EKG:  I have reviewed the EKG with the following interpretation: The Ekg interpreted in the absence of a cardiologist shows normal sinus rhythm. Possible Left atrial enlargement. Borderline ECG. When compared with ECG of 01-FEB-2022 02:22, No significant change was found     CT PELVIS WO CONTRAST Additional Contrast? None   Final Result   1. Small 14 mm ovoid fluid collection in the subcutaneous fat of the right   perineum unchanged from at least 01/01/2021. Minimal adjacent subcutaneous   fat stranding. This may represent a sebaceous cyst with superimposed   infection not excluded. 2. No soft tissue gas or other drainable fluid collection. 3. No acute osseous abnormality. XR CHEST PORTABLE   Final Result   Vascular congestion. No consolidation. Stable cardiomegaly.            ASSESSMENT:    Active Hospital Problems    Diagnosis Date Noted    Acute on chronic combined systolic and diastolic heart failure (HCC) [I50.43]      Priority: High    Dyslipidemia [E78.5]      Priority: High    Type 2 diabetes, uncontrolled, with neuropathy (HCC) [E11.40, E11.65] 03/04/2014     Priority: High  Essential hypertension [I10] 11/14/2013     Priority: Medium    ESRD (end stage renal disease) ([facility] [N18.6] 11/22/2017    Obesity (BMI 30-39. 9) [E66.9]      PLAN:    SIRS in setting of perirectal abscess in pt with c/o buttock pain. , WBC 13.1  -CT pelvis revealed: small 14 mm ovoid fluid collection in subcutaneous fat of the right perineum unchanged from atleast 01/01/2021. Minimal adjacent subcutaneous fat stranding. This may represent a sebaceous cyst with superimposed infection. No soft tissue gas or other drainable fluid collection.  -vancomycin and zosyn given in ED and continued 2/21/2022  -percocet and dilaudid given in ED  -lactic 1.2  -blood cultures ordered in ED  -cbc in am  -PRN pain medication  -IVF bolus not given d/t ESRD and fluid overload  -general surgery consult - appreciate recommendations in advance    Shortness of breath likely 2/2 fluid overload in patient with ESRD  -HD M-W-F  -ProBNP 00,481  -continue phoslo  -bmp in am  -nephrology consult - appreciate recommendations in advance    Acute on chronic CHF, stable  -does not appear to be in acute exacerbation at this time  -strict I&O  -daily weights  -continue demadex    Obesity  With Body mass index is 38.4 kg/m². Complicating assessment and treatment. Placing patient at risk for multiple co-morbidities as well as early death and contributing to the patient's presentation.  Counseled on weight loss    DM2 with neuropathy, uncontrolled  -  -hemglobin a1c 9.4 on 1/12/2022  -continue long acting insulin  -hdssi  -poct ac/hs  -hypoglycemia protocol  -carb control diet  -continue gabapentin    Essential HTN  -continue metoprolol and lisinopril    HLD  -continue pravastatin    Elevated troponin, 0.15 on admission  -no c/o chest pain  -likely 2/2 demand ischemia r/t ESRD  -trend troponin  -tele monitoring    Chronic normocytic anemia  -no s/s of bleeding at this time  -cbc in am    Anxiety depression  -continue home meds  -mood stable at this time    DVT Prophylaxis: heparin    Diet: Diet NPO     Code Status: Prior    PT/OT Eval Status: No indication for need at this time    Dispo - 2-3 days pending clinical improvement     Elene Heimlich, APRN - CNP    Thank you Anna Camargo MD for the opportunity to be involved in this patient's care.  If you have any questions or concerns please feel free to contact me at [phone].  -------------------------Dr. Charly Saleem---------------------------------

## 2022-02-22 NOTE — ED PROVIDER NOTES
I independently examined and evaluated Theresa Phoenix. In brief, patient is a 33-year-old male with history of a ESRD on dialysis Monday Wednesday Friday who presents emergency department for evaluation of shortness of breath. Patient was in moderate distress due to pain and is tachypneic. No wheezing. Was noted to have fluctuant lesion in the perineum. Meets sepsis criteria. Given Iv antibiotics. CT shows 14mm fluid collection int he subcutaneous fat that is unchanged from 01/01/21. Hospitalist consulted for admission for further evaluation and treatment. Admit. The Ekg interpreted by me shows  Normal sinus rhythm with a rate of 99  Axis is normal  QTc is acceptable  Intervals and Durations are unremarkable. ST Segments: Nonspecific changes. Artifact present on V1 lead. All diagnostic, treatment, and disposition decisions were made by myself in conjunction with the advanced practice provider. Comment: Please note this report has been produced using speech recognition software and may contain errors related to that system including errors in grammar, punctuation, and spelling, as well as words and phrases that may be inappropriate. If there are any questions or concerns please feel free to contact the dictating provider for clarification. For all further details of the patient's emergency department visit, please see the advanced practice provider's documentation.           Karina Fortune MD  02/22/22 [id number]

## 2022-02-22 NOTE — CONSULTS
Consult placed    Who:Dr. Iain Ahmadi  Date:2/22/2022,  Time:8:09 AM        Electronically signed by Claudia Barney on 2/22/2022 at 8:09 AM

## 2022-02-22 NOTE — CARE COORDINATION
CASE MANAGEMENT INITIAL ASSESSMENT      Reviewed chart and completed assessment with patient  Explained Case Management role/services    Health Care Decision Maker :   Primary Decision Maker: Crystal Ann - Spouse - [phone]        Admit date/status: IPA 2/21/22  Diagnosis: shortness of breath     Is this a Readmission?:  No      Insurance: [facility] required for SNF: Yes       3 night stay required: No    Living arrangements, Adls, care needs, prior to admission: lives in trailer with spouse. No steps to enter. Pt indep in adls    Transportation: TBD     Durable Medical Equipment at home:  Walker__Cane__RTS_x_ BSC__Shower Chair__ 02__ HHN__ CPAP_x_  BiPap__  Hospital Bed__ W/C___     Services in the home and/or outpatient, prior to admission: none    Dialysis Facility (if applicable)   · Name: [place]  · Address:  · Dialysis Schedule: [facility]  · Phone:  · Fax:    PT/OT recs: not completed at this time    Hospital Exemption Notification (HEN): not started    Barriers to discharge: none    Plan/comments: pt disposition likely several more days d/t multiple specialties involved in case. CM team will follow for needs. PT/OT recs would be helpful in determining needs for discharge. ECOC on chart for MD signature. CM team will follow.

## 2022-02-22 NOTE — PROGRESS NOTES
Pt refusing bed alarm, pt educated about need for Alarm. Pt is high fall risk and A&O x4. Pt acknowledged educated but stated \"it's annoying, and I still don't want it\". RN will continue to educate pt.

## 2022-02-22 NOTE — PROGRESS NOTES
Patient admitted to room 368 from ED. Patient oriented to room, call light, bed rails, phone, lights and bathroom. Patient instructed about the schedule of the day including: vital sign frequency, lab draws, possible tests, frequency of MD and staff rounds, including RN/MD rounding together at bedside, daily weights, and I &O's. Patient instructed about prescribed diet, how to use 8MENU, and television. bed alarm in place, patient aware of placement and reason. Telemetry box 78 in place, patient aware of placement and reason. Bed locked, in lowest position, side rails up 2/4, call light within reach. Will continue to monitor.

## 2022-02-22 NOTE — TELEPHONE ENCOUNTER
Last Office Visit  -  1/18/22  Next Office Visit  -  3/2/22    Last Filled  -    Last UDS -    Contract -

## 2022-02-22 NOTE — CONSULTS
Pharmacy to Dose Vancomycin    Dx: [facility]  Goal trough = 10-20  Pt wt = 117.9 kg  Recent Labs     02/21/22 1810   CREATININE 8.0*     Recent Labs     02/21/22 1810   WBC 13.1*     Vanc 1500 mg given in ED at 2050. Pulse dose. Vancomycin random in am.  [place], [facility] 2/21/2022 11:14 PM    2/23  Vanc level = 15.5mcg  Last dose on 2/21 ~ 20:50  Recommended vanc 1,000mg X1, level before next HD on   2/25am.  Shannon Tavares/Penelope. 2/23/22 2/26  Vanc random = 17.9  Will give 750 mg IV X1 dose after dialysis. Level due Monday with am labs    2/28  Vanc random = 18.1 mcg/mL at 0751. Dialysis today. Give vancomycin 750 mg x1 post dialysis. Vanc random on 1/2.   Eleanor Phillips, PharmD  2/28/2022 9:18 AM      Vancomycin Day 10   Pulse dose   HD on [facility]  Vanc random = 21.2 mcg/mL at 0641  Give Vancomycin 750 mg x1 after dialysis  Vanc level ordered for 3/4 AM   Serafin iVvar, PharmD 3/2/2022  8:24 AM

## 2022-02-22 NOTE — PROGRESS NOTES
02/21/22 2333   RT Protocol   History Pulmonary Disease 1   Respiratory pattern 0   Breath sounds 2   Cough 0   Indications for Bronchodilator Therapy Decreased or absent breath sounds; On home bronchodilators   Bronchodilator Assessment Score 3

## 2022-02-22 NOTE — PROGRESS NOTES
Physician Progress Note      PATIENT:               Samson Lemus  CSN #:                  [id number]  :                       1970  ADMIT DATE:       2022 4:42 PM  DISCH DATE:  RESPONDING  PROVIDER #:        Luanne Mckeon          QUERY TEXT:    Pt admitted with \"SIRS in setting of perirectal abscess in pt with c/o buttock   pain. , WBC 13. 1. \"  ED consult- \"Sepsis without acute organ   dysfunction, due to unspecified organism (HCC)\"   If possible, please document   in progress notes and discharge summary:    The medical record reflects the following:  Risk Factors: rectal abscess, ESRD  Clinical Indicators: WBC- 13.1 , Temp 100.3 OA- ED-\"Fluids not given due   to ESRD patient with elevated BNP\"  HPI- small14 mm ovoid fluid collection in   subcutaneous fat of the right perineum unchanged from atleast 2021. Minimal adjacent subcutaneous fat stranding. This may represent a sebaceous   cyst with superimposed infection. No soft tissue gas or other drainable fluid   collection. \"  Treatment: Broad-spectrum antibiotics chosen: vancomycin and zosyn based on   sepsis order-set for a suspected source of: Skin and Soft Tissue Repeat   lactate level: not indicated initial lactic acid level normal, vancomycin and   zosyn given in ED and continued 2022-blood cultures ordered in ED-cbc in   am-IVF bolus not given d/t ESRD and fluid overload-general surgery consult -    Thank-You, Carlyn Turner RN, BSN, CCDS  Options provided:  -- Sepsis due to rectal abscess confirmed present on arrival  -- Sepsis ruled out  -- Other - I will add my own diagnosis  -- Disagree - Not applicable / Not valid  -- Disagree - Clinically unable to determine / Unknown  -- Refer to Clinical Documentation Reviewer    PROVIDER RESPONSE TEXT:    Sepsis due to perianal cellulitis present on arrival    Query created by: Cammie Closs on 2022 1:13 PM      Electronically signed by:  Luanne Mckeon 2022 5:39 PM

## 2022-02-22 NOTE — PROGRESS NOTES
4 Eyes Admission Assessment     I agree as the admission nurse that 2 RN's have performed a thorough Head to Toe Skin Assessment on the patient. ALL assessment sites listed below have been assessed on admission. Areas assessed by both nurses: Yes  [x]   Head, Face, and Ears   [x]   Shoulders, Back, and Chest  [x]   Arms, Elbows, and Hands   [x]   Coccyx, Sacrum, and Ischum  [x]   Legs, Feet, and Heels        Does the Patient have Skin Breakdown?   No         Isaac Prevention initiated:  No   Wound Care Orders initiated:  No      [facility] nurse consulted for Pressure Injury (Stage 3,4, Unstageable, DTI, NWPT, and Complex wounds):  No      Nurse 1 eSignature: Electronically signed by Radha Maier RN on 2/22/22 at 6:43 AM EST    **SHARE this note so that the co-signing nurse is able to place an eSignature**    Nurse 2 eSignature: Electronically signed by Roberta Santos RN on 2/23/22 at 1:12 AM EST

## 2022-02-23 LAB
A/G RATIO: 1.1 (ref 1.1–2.2)
ALBUMIN SERPL-MCNC: 3.4 G/DL (ref 3.4–5)
ALP BLD-CCNC: 125 U/L (ref 40–129)
ALT SERPL-CCNC: 8 U/L (ref 10–40)
ANION GAP SERPL CALCULATED.3IONS-SCNC: 21 MMOL/L (ref 3–16)
AST SERPL-CCNC: 12 U/L (ref 15–37)
BACTERIA: ABNORMAL /HPF
BASOPHILS ABSOLUTE: 0 K/UL (ref 0–0.2)
BASOPHILS RELATIVE PERCENT: 0.3 %
BILIRUB SERPL-MCNC: <0.2 MG/DL (ref 0–1)
BILIRUBIN URINE: NEGATIVE
BLOOD, URINE: ABNORMAL
BUN BLDV-MCNC: 99 MG/DL (ref 7–20)
CALCIUM SERPL-MCNC: 8.4 MG/DL (ref 8.3–10.6)
CHLORIDE BLD-SCNC: 92 MMOL/L (ref 99–110)
CLARITY: CLEAR
CO2: 18 MMOL/L (ref 21–32)
COLOR: YELLOW
CREAT SERPL-MCNC: 11.3 MG/DL (ref 0.9–1.3)
EOSINOPHILS ABSOLUTE: 0.2 K/UL (ref 0–0.6)
EOSINOPHILS RELATIVE PERCENT: 1.3 %
EPITHELIAL CELLS, UA: ABNORMAL /HPF (ref 0–5)
GFR AFRICAN AMERICAN: 6
GFR NON-AFRICAN AMERICAN: 5
GLUCOSE BLD-MCNC: 114 MG/DL (ref 70–99)
GLUCOSE BLD-MCNC: 138 MG/DL (ref 70–99)
GLUCOSE BLD-MCNC: 155 MG/DL (ref 70–99)
GLUCOSE BLD-MCNC: 78 MG/DL (ref 70–99)
GLUCOSE URINE: 250 MG/DL
HCT VFR BLD CALC: 23.5 % (ref 40.5–52.5)
HEMOGLOBIN: 7.6 G/DL (ref 13.5–17.5)
KETONES, URINE: NEGATIVE MG/DL
LEUKOCYTE ESTERASE, URINE: NEGATIVE
LYMPHOCYTES ABSOLUTE: 0.6 K/UL (ref 1–5.1)
LYMPHOCYTES RELATIVE PERCENT: 3.8 %
MCH RBC QN AUTO: 29.3 PG (ref 26–34)
MCHC RBC AUTO-ENTMCNC: 32.3 G/DL (ref 31–36)
MCV RBC AUTO: 90.7 FL (ref 80–100)
MICROSCOPIC EXAMINATION: YES
MONOCYTES ABSOLUTE: 1.1 K/UL (ref 0–1.3)
MONOCYTES RELATIVE PERCENT: 7 %
MUCUS: ABNORMAL /LPF
NEUTROPHILS ABSOLUTE: 14.1 K/UL (ref 1.7–7.7)
NEUTROPHILS RELATIVE PERCENT: 87.6 %
NITRITE, URINE: NEGATIVE
PDW BLD-RTO: 16.1 % (ref 12.4–15.4)
PERFORMED ON: ABNORMAL
PH UA: 6.5 (ref 5–8)
PLATELET # BLD: 267 K/UL (ref 135–450)
PMV BLD AUTO: 8.2 FL (ref 5–10.5)
POTASSIUM REFLEX MAGNESIUM: 5.9 MMOL/L (ref 3.5–5.1)
PROTEIN UA: >=300 MG/DL
RBC # BLD: 2.59 M/UL (ref 4.2–5.9)
RBC UA: ABNORMAL /HPF (ref 0–4)
SODIUM BLD-SCNC: 131 MMOL/L (ref 136–145)
SPECIFIC GRAVITY UA: 1.01 (ref 1–1.03)
TOTAL PROTEIN: 6.5 G/DL (ref 6.4–8.2)
URINE REFLEX TO CULTURE: ABNORMAL
URINE TYPE: ABNORMAL
UROBILINOGEN, URINE: 0.2 E.U./DL
VANCOMYCIN RANDOM: 15.5 UG/ML
WBC # BLD: 16 K/UL (ref 4–11)
WBC UA: ABNORMAL /HPF (ref 0–5)

## 2022-02-23 PROCEDURE — 6360000002 HC RX W HCPCS: Performed by: SURGERY

## 2022-02-23 PROCEDURE — 6370000000 HC RX 637 (ALT 250 FOR IP): Performed by: STUDENT IN AN ORGANIZED HEALTH CARE EDUCATION/TRAINING PROGRAM

## 2022-02-23 PROCEDURE — 2700000000 HC OXYGEN THERAPY PER DAY

## 2022-02-23 PROCEDURE — 6360000002 HC RX W HCPCS

## 2022-02-23 PROCEDURE — 2500000003 HC RX 250 WO HCPCS: Performed by: NURSE PRACTITIONER

## 2022-02-23 PROCEDURE — 1200000000 HC SEMI PRIVATE

## 2022-02-23 PROCEDURE — 94761 N-INVAS EAR/PLS OXIMETRY MLT: CPT

## 2022-02-23 PROCEDURE — 2580000003 HC RX 258: Performed by: STUDENT IN AN ORGANIZED HEALTH CARE EDUCATION/TRAINING PROGRAM

## 2022-02-23 PROCEDURE — 94762 N-INVAS EAR/PLS OXIMTRY CONT: CPT

## 2022-02-23 PROCEDURE — 6360000002 HC RX W HCPCS: Performed by: NURSE PRACTITIONER

## 2022-02-23 PROCEDURE — 6370000000 HC RX 637 (ALT 250 FOR IP): Performed by: NURSE PRACTITIONER

## 2022-02-23 PROCEDURE — 94640 AIRWAY INHALATION TREATMENT: CPT

## 2022-02-23 PROCEDURE — 6360000002 HC RX W HCPCS: Performed by: INTERNAL MEDICINE

## 2022-02-23 PROCEDURE — 5A1D70Z PERFORMANCE OF URINARY FILTRATION, INTERMITTENT, LESS THAN 6 HOURS PER DAY: ICD-10-PCS | Performed by: INTERNAL MEDICINE

## 2022-02-23 PROCEDURE — 85025 COMPLETE CBC W/AUTO DIFF WBC: CPT

## 2022-02-23 PROCEDURE — 6360000002 HC RX W HCPCS: Performed by: STUDENT IN AN ORGANIZED HEALTH CARE EDUCATION/TRAINING PROGRAM

## 2022-02-23 PROCEDURE — 90935 HEMODIALYSIS ONE EVALUATION: CPT

## 2022-02-23 PROCEDURE — 2580000003 HC RX 258: Performed by: NURSE PRACTITIONER

## 2022-02-23 PROCEDURE — 94660 CPAP INITIATION&MGMT: CPT

## 2022-02-23 PROCEDURE — 80202 ASSAY OF VANCOMYCIN: CPT

## 2022-02-23 PROCEDURE — 80053 COMPREHEN METABOLIC PANEL: CPT

## 2022-02-23 RX ORDER — HEPARIN SODIUM 1000 [USP'U]/ML
4500 INJECTION, SOLUTION INTRAVENOUS; SUBCUTANEOUS PRN
Status: DISCONTINUED | OUTPATIENT
Start: 2022-02-23 | End: 2022-02-26

## 2022-02-23 RX ORDER — PROCHLORPERAZINE EDISYLATE 5 MG/ML
10 INJECTION INTRAMUSCULAR; INTRAVENOUS EVERY 6 HOURS PRN
Status: DISCONTINUED | OUTPATIENT
Start: 2022-02-23 | End: 2022-03-03 | Stop reason: HOSPADM

## 2022-02-23 RX ORDER — HEPARIN SODIUM 1000 [USP'U]/ML
INJECTION, SOLUTION INTRAVENOUS; SUBCUTANEOUS
Status: DISPENSED
Start: 2022-02-23 | End: 2022-02-23

## 2022-02-23 RX ORDER — ONDANSETRON 2 MG/ML
INJECTION INTRAMUSCULAR; INTRAVENOUS
Status: COMPLETED
Start: 2022-02-23 | End: 2022-02-23

## 2022-02-23 RX ADMIN — PROCHLORPERAZINE EDISYLATE 10 MG: 5 INJECTION INTRAMUSCULAR; INTRAVENOUS at 22:25

## 2022-02-23 RX ADMIN — INSULIN LISPRO 2 UNITS: 100 INJECTION, SOLUTION INTRAVENOUS; SUBCUTANEOUS at 21:51

## 2022-02-23 RX ADMIN — METRONIDAZOLE 500 MG: 250 TABLET ORAL at 14:21

## 2022-02-23 RX ADMIN — SODIUM CHLORIDE, PRESERVATIVE FREE 10 ML: 5 INJECTION INTRAVENOUS at 21:52

## 2022-02-23 RX ADMIN — METOPROLOL SUCCINATE 50 MG: 50 TABLET, EXTENDED RELEASE ORAL at 12:41

## 2022-02-23 RX ADMIN — SODIUM CHLORIDE, PRESERVATIVE FREE 10 ML: 5 INJECTION INTRAVENOUS at 12:46

## 2022-02-23 RX ADMIN — HEPARIN SODIUM 5000 UNITS: 5000 INJECTION INTRAVENOUS; SUBCUTANEOUS at 06:28

## 2022-02-23 RX ADMIN — CLOPIDOGREL BISULFATE 75 MG: 75 TABLET ORAL at 12:42

## 2022-02-23 RX ADMIN — LISINOPRIL 10 MG: 10 TABLET ORAL at 12:41

## 2022-02-23 RX ADMIN — DULOXETINE HYDROCHLORIDE 60 MG: 60 CAPSULE, DELAYED RELEASE ORAL at 12:40

## 2022-02-23 RX ADMIN — ONDANSETRON 4 MG: 2 INJECTION INTRAMUSCULAR; INTRAVENOUS at 09:18

## 2022-02-23 RX ADMIN — FENTANYL CITRATE 25 MCG: 0.05 INJECTION, SOLUTION INTRAMUSCULAR; INTRAVENOUS at 23:30

## 2022-02-23 RX ADMIN — PRAVASTATIN SODIUM 40 MG: 40 TABLET ORAL at 12:42

## 2022-02-23 RX ADMIN — METRONIDAZOLE 500 MG: 250 TABLET ORAL at 06:27

## 2022-02-23 RX ADMIN — ASPIRIN 81 MG 81 MG: 81 TABLET ORAL at 12:40

## 2022-02-23 RX ADMIN — INSULIN GLARGINE 30 UNITS: 100 INJECTION, SOLUTION SUBCUTANEOUS at 21:51

## 2022-02-23 RX ADMIN — TIOTROPIUM BROMIDE AND OLODATEROL 2 PUFF: 3.124; 2.736 SPRAY, METERED RESPIRATORY (INHALATION) at 08:09

## 2022-02-23 RX ADMIN — OXYCODONE 5 MG: 5 TABLET ORAL at 09:09

## 2022-02-23 RX ADMIN — GABAPENTIN 100 MG: 100 CAPSULE ORAL at 12:42

## 2022-02-23 RX ADMIN — HEPARIN SODIUM 4500 UNITS: 1000 INJECTION INTRAVENOUS; SUBCUTANEOUS at 11:54

## 2022-02-23 RX ADMIN — ACETAMINOPHEN 650 MG: 325 TABLET ORAL at 21:52

## 2022-02-23 RX ADMIN — METRONIDAZOLE 500 MG: 250 TABLET ORAL at 21:52

## 2022-02-23 RX ADMIN — GABAPENTIN 100 MG: 100 CAPSULE ORAL at 20:31

## 2022-02-23 RX ADMIN — ACETAMINOPHEN 650 MG: 325 TABLET ORAL at 06:27

## 2022-02-23 RX ADMIN — HEPARIN SODIUM 5000 UNITS: 5000 INJECTION INTRAVENOUS; SUBCUTANEOUS at 14:22

## 2022-02-23 RX ADMIN — OXYCODONE 5 MG: 5 TABLET ORAL at 14:21

## 2022-02-23 RX ADMIN — CEFEPIME 1000 MG: 1 INJECTION, POWDER, FOR SOLUTION INTRAMUSCULAR; INTRAVENOUS at 15:44

## 2022-02-23 RX ADMIN — HEPARIN SODIUM 5000 UNITS: 5000 INJECTION INTRAVENOUS; SUBCUTANEOUS at 20:32

## 2022-02-23 RX ADMIN — ISOSORBIDE MONONITRATE 30 MG: 30 TABLET, EXTENDED RELEASE ORAL at 12:42

## 2022-02-23 RX ADMIN — ACETAMINOPHEN 650 MG: 325 TABLET ORAL at 14:22

## 2022-02-23 RX ADMIN — CALCIUM ACETATE 667 MG: 667 CAPSULE ORAL at 18:18

## 2022-02-23 RX ADMIN — TORSEMIDE 100 MG: 100 TABLET ORAL at 12:41

## 2022-02-23 RX ADMIN — VANCOMYCIN HYDROCHLORIDE 1000 MG: 1 INJECTION, POWDER, LYOPHILIZED, FOR SOLUTION INTRAVENOUS at 14:20

## 2022-02-23 RX ADMIN — CALCIUM ACETATE 667 MG: 667 CAPSULE ORAL at 12:41

## 2022-02-23 RX ADMIN — IPRATROPIUM BROMIDE AND ALBUTEROL SULFATE 3 ML: .5; 2.5 SOLUTION RESPIRATORY (INHALATION) at 09:34

## 2022-02-23 RX ADMIN — EPOETIN ALFA-EPBX 10000 UNITS: 10000 INJECTION, SOLUTION INTRAVENOUS; SUBCUTANEOUS at 11:53

## 2022-02-23 ASSESSMENT — PAIN DESCRIPTION - LOCATION: LOCATION: GENERALIZED

## 2022-02-23 ASSESSMENT — PAIN DESCRIPTION - FREQUENCY: FREQUENCY: CONTINUOUS

## 2022-02-23 ASSESSMENT — PAIN SCALES - GENERAL
PAINLEVEL_OUTOF10: 6
PAINLEVEL_OUTOF10: 10
PAINLEVEL_OUTOF10: 0
PAINLEVEL_OUTOF10: 5
PAINLEVEL_OUTOF10: 5
PAINLEVEL_OUTOF10: 0
PAINLEVEL_OUTOF10: 5
PAINLEVEL_OUTOF10: 0
PAINLEVEL_OUTOF10: 8

## 2022-02-23 ASSESSMENT — PAIN DESCRIPTION - DESCRIPTORS: DESCRIPTORS: ACHING

## 2022-02-23 ASSESSMENT — PAIN DESCRIPTION - ONSET: ONSET: ON-GOING

## 2022-02-23 NOTE — PROGRESS NOTES
dialysis MWF at [facility]. Failed PD. Not transplant candidate. Had fistula ligated due to dialysis associated steal.  HD today with 3 kg UF. Post weight was 118 kg. He has not reached his target of 116 kg in over 1 months      Anemia of chronic disease-CKD:  - Hb is below target, on DAVON at the dialysis unit     Hyponatremia:  Due to volume overload in setting of ESRD    Hyperkalemia:  Due to ESRD, had dialysis today       Perineal Pain:  Surgery following and at this point no surgical intervention required. Cellulitis with no overt abscess noted on CT.   On broad spectrum IV antibiotics and pain medications    Plan/     Labs on dialysis days to reduce lab draws  ESRD per regular schedule  EPO with dialysis   Antibiotics    -----------------------------  Deyanira Arriaga M.D.   [facility]

## 2022-02-23 NOTE — CARE COORDINATION
Referral called to [facility]. However, they needed more information - MD orders, overnight sleep study, and \"chart notes\". Overnight sleep study, nor overnight pulse ox is ordered. CM [employer] messaged provider to clarify what test is needed.

## 2022-02-23 NOTE — PROGRESS NOTES
02/22/22 2117   NIV Type   $NIV $Daily Charge   Skin Assessment Clean, dry, & intact   NIV Started/Stopped On   Equipment Type v60   Mode Bilevel   Mask Type Full face mask   Mask Size Medium   Settings/Measurements   IPAP 16 cmH20   CPAP/EPAP 1 cmH2O   Rate Ordered 10   Resp 22   FiO2  40 %   I Time/ I Time % 1 s   Vt Exhaled 747 mL   Mask Leak (lpm) 37 lpm   Comfort Level Good   Using Accessory Muscles No   SpO2 97   Breath Sounds   Right Upper Lobe Diminished; Expiratory Wheezes   Right Middle Lobe Diminished;Coarse Crackles   Right Lower Lobe Diminished; Expiratory Wheezes   Left Upper Lobe Diminished;Coarse Crackles   Left Lower Lobe Diminished;Coarse Crackles   Alarm Settings   Alarms On Y   Press Low Alarm 6 cmH2O   High Pressure Alarm 30 cmH2O   Resp Rate Low Alarm 6   High Respiratory Rate 40 br/min   overnight pulse ox began when placed on bipap at 2123

## 2022-02-23 NOTE — PROGRESS NOTES
02/23/22 0353   NIV Type   $NIV $Daily Charge   Equipment Type V60   Mode Bilevel   Mask Type Full face mask   Mask Size Medium   Settings/Measurements   IPAP 16 cmH20   CPAP/EPAP 10 cmH2O   Rate Ordered 10   Resp 18   FiO2  30 %   Vt Exhaled 792 mL   Minute Volume 12.5 Liters   Mask Leak (lpm) 52 lpm   Comfort Level Good   Using Accessory Muscles No   SpO2 97   Alarm Settings   Alarms On Y

## 2022-02-23 NOTE — PROGRESS NOTES
02/22/22 2324   NIV Type   NIV Started/Stopped On   Equipment Type V60   Mode Bilevel   Mask Type Full face mask   Mask Size Medium   Settings/Measurements   IPAP 16 cmH20   CPAP/EPAP 10 cmH2O   Rate Ordered 10   Resp 20   FiO2  30 %   Vt Exhaled 471 mL   Minute Volume 76.3 Liters   Mask Leak (lpm) 40 lpm   Comfort Level Good   Using Accessory Muscles No   SpO2 97   Alarm Settings   Alarms On Y

## 2022-02-23 NOTE — FLOWSHEET NOTE
Treatment time: 3.5 hours  Net UF: 3045 ml    Pre weight: 121 kg   Post weight: 118.3 kg  EDW: 116 kg    Access used: left chest wall TDC  Access function: Ok with -350 ml/min, positional    Medications or blood products given: Zofran, Retacrit    Regular outpatient schedule: [facility]    Summary of response to treatment: Pt tolerated fair with HD, Pt c/o back pain, but he vomited out 5 minutes after he got his oral pain medication. Zofran IV given once. RT gave him breathing treatment once for his SOB, Keep sBP >100 during HD. HD completed in full, heparin dwell in TDC, capped and clamped.      Cirt Line: Initial Hct: 25.9;   End Profile : A; Refill ( Hct.1 -28.6  subtract Hct 2- 28.4): 0.2 (no Refill); BV: -8.8 %      Copy of dialysis treatment record placed in chart, to be scanned into EMR.     02/23/22 0804 02/23/22 1158   Vital Signs   BP (!) 114/42 (!) 105/38   Temp 98 °F (36.7 °C) 98.4 °F (36.9 °C)   Pulse 97 101   Resp 16 16   Weight 266 lb 12.1 oz (121 kg) 260 lb 12.9 oz (118.3 kg)   Weight Method Bed scale Bed scale   Percent Weight Change 0 -2.23   Dry Weight 255 lb 11.7 oz (116 kg)  --    Post-Hemodialysis Assessment   Post-Treatment Procedures  --  Blood returned;Catheter capped, clamped and heparinized x 2 ports   Machine Disinfection Process  --  Acid/Vinegar Clean;Heat Disinfect   Rinseback Volume (ml)  --  400 ml   Total Liters Processed (l/min)  --  62.3 l/min   Dialyzer Clearance  --  Lightly streaked   NET Removed (ml)  --  3045 ml   Tolerated Treatment  --  Fair

## 2022-02-23 NOTE — PROGRESS NOTES
Unable to collect ordered urine sample. Patient did not urinate this shift. No need to straight cath patient per Dr. Arina Meza.

## 2022-02-23 NOTE — CARE COORDINATION
Per information obtained at 1100 IDR, pt is to receive an overnight pulse oximetry test in order to qualify for home CPAP. No orders for this testing is in chart. CM will f/u with provider at 1300 CM/provider huddle. Cm left VM for Karmen Jara ([phone]) with Marisela Pandey in regards to needed equipment. Pending call back from Marisela Pandey.

## 2022-02-23 NOTE — PROGRESS NOTES
Pt off the floor earlier today for dialysis. Discussed now /w RN - pt very tired post-dialysis, has been sleeping most of afternoon. Apparently still c/o perineal pain, had low-grade fever overnight but none today. Will re-examine in AM.  If still w/ unimproved pain, tenderness at perineal region, will likely proceed with I&D in OR under anesthesia.     DTW

## 2022-02-23 NOTE — CARE COORDINATION
Call back received from [facility] with Marquis Adam. States Jovitalore Joaquin is not in network with their agency. She will attempt to find accepting agency and call CM back.

## 2022-02-23 NOTE — CONSULTS
Nutrition Note    RD received consult for CHF nutrition education. Pt very tired today, fell asleep during discussion. Handout left at bedside. Will monitor for questions on diet. Will continue to monitor per nutrition standards of care.      Faby Gonzalez, MS, RD, LD on 2/23/2022 at 4:03 PM  Office: [phone]

## 2022-02-24 ENCOUNTER — APPOINTMENT (OUTPATIENT)
Dept: GENERAL RADIOLOGY | Age: 52
DRG: 871 | End: 2022-02-24
Payer: COMMERCIAL

## 2022-02-24 ENCOUNTER — ANESTHESIA (OUTPATIENT)
Dept: OPERATING ROOM | Age: 52
DRG: 871 | End: 2022-02-24
Payer: COMMERCIAL

## 2022-02-24 ENCOUNTER — ANESTHESIA EVENT (OUTPATIENT)
Dept: OPERATING ROOM | Age: 52
DRG: 871 | End: 2022-02-24
Payer: COMMERCIAL

## 2022-02-24 ENCOUNTER — APPOINTMENT (OUTPATIENT)
Dept: CT IMAGING | Age: 52
DRG: 871 | End: 2022-02-24
Payer: COMMERCIAL

## 2022-02-24 VITALS — DIASTOLIC BLOOD PRESSURE: 66 MMHG | OXYGEN SATURATION: 100 % | SYSTOLIC BLOOD PRESSURE: 147 MMHG

## 2022-02-24 LAB
A/G RATIO: 1 (ref 1.1–2.2)
ALBUMIN SERPL-MCNC: 3.3 G/DL (ref 3.4–5)
ALP BLD-CCNC: 105 U/L (ref 40–129)
ALT SERPL-CCNC: 8 U/L (ref 10–40)
AMMONIA: 15 UMOL/L (ref 16–60)
ANION GAP SERPL CALCULATED.3IONS-SCNC: 17 MMOL/L (ref 3–16)
AST SERPL-CCNC: 10 U/L (ref 15–37)
BASE EXCESS VENOUS: -3.5 MMOL/L (ref -3–3)
BILIRUB SERPL-MCNC: <0.2 MG/DL (ref 0–1)
BUN BLDV-MCNC: 57 MG/DL (ref 7–20)
CALCIUM SERPL-MCNC: 8.7 MG/DL (ref 8.3–10.6)
CARBOXYHEMOGLOBIN: 2.1 % (ref 0–1.5)
CHLORIDE BLD-SCNC: 90 MMOL/L (ref 99–110)
CO2: 23 MMOL/L (ref 21–32)
CREAT SERPL-MCNC: 7.5 MG/DL (ref 0.9–1.3)
D DIMER: 2410 NG/ML DDU (ref 0–229)
EKG ATRIAL RATE: 277 BPM
EKG DIAGNOSIS: NORMAL
EKG Q-T INTERVAL: 300 MS
EKG QRS DURATION: 92 MS
EKG QTC CALCULATION (BAZETT): 411 MS
EKG R AXIS: 10 DEGREES
EKG T AXIS: 15 DEGREES
EKG VENTRICULAR RATE: 113 BPM
GFR AFRICAN AMERICAN: 9
GFR NON-AFRICAN AMERICAN: 8
GLUCOSE BLD-MCNC: 117 MG/DL (ref 70–99)
GLUCOSE BLD-MCNC: 125 MG/DL (ref 70–99)
GLUCOSE BLD-MCNC: 133 MG/DL (ref 70–99)
GLUCOSE BLD-MCNC: 133 MG/DL (ref 70–99)
GLUCOSE BLD-MCNC: 138 MG/DL (ref 70–99)
GLUCOSE BLD-MCNC: 143 MG/DL (ref 70–99)
GLUCOSE BLD-MCNC: 143 MG/DL (ref 70–99)
HCO3 VENOUS: 23 MMOL/L (ref 23–29)
HCT VFR BLD CALC: 24.1 % (ref 40.5–52.5)
HEMOGLOBIN: 7.9 G/DL (ref 13.5–17.5)
LACTIC ACID, SEPSIS: 0.9 MMOL/L (ref 0.4–1.9)
LACTIC ACID: 0.8 MMOL/L (ref 0.4–2)
MCH RBC QN AUTO: 29.4 PG (ref 26–34)
MCHC RBC AUTO-ENTMCNC: 33 G/DL (ref 31–36)
MCV RBC AUTO: 89.1 FL (ref 80–100)
METHEMOGLOBIN VENOUS: 0.5 %
O2 SAT, VEN: 90 %
O2 THERAPY: ABNORMAL
PCO2, VEN: 48.2 MMHG (ref 40–50)
PDW BLD-RTO: 16 % (ref 12.4–15.4)
PERFORMED ON: ABNORMAL
PH VENOUS: 7.3 (ref 7.35–7.45)
PLATELET # BLD: 299 K/UL (ref 135–450)
PMV BLD AUTO: 7.8 FL (ref 5–10.5)
PO2, VEN: 66 MMHG (ref 25–40)
POTASSIUM SERPL-SCNC: 4.9 MMOL/L (ref 3.5–5.1)
PRO-BNP: ABNORMAL PG/ML (ref 0–124)
RBC # BLD: 2.7 M/UL (ref 4.2–5.9)
SARS-COV-2, NAAT: NOT DETECTED
SODIUM BLD-SCNC: 130 MMOL/L (ref 136–145)
TCO2 CALC VENOUS: 25 MMOL/L
TOTAL PROTEIN: 6.6 G/DL (ref 6.4–8.2)
WBC # BLD: 13 K/UL (ref 4–11)

## 2022-02-24 PROCEDURE — 6360000002 HC RX W HCPCS: Performed by: ANESTHESIOLOGY

## 2022-02-24 PROCEDURE — 2580000003 HC RX 258: Performed by: SURGERY

## 2022-02-24 PROCEDURE — 46050 I&D PERIANAL ABSCESS SUPFC: CPT | Performed by: SURGERY

## 2022-02-24 PROCEDURE — 80053 COMPREHEN METABOLIC PANEL: CPT

## 2022-02-24 PROCEDURE — 87186 SC STD MICRODIL/AGAR DIL: CPT

## 2022-02-24 PROCEDURE — 85379 FIBRIN DEGRADATION QUANT: CPT

## 2022-02-24 PROCEDURE — 6360000002 HC RX W HCPCS: Performed by: STUDENT IN AN ORGANIZED HEALTH CARE EDUCATION/TRAINING PROGRAM

## 2022-02-24 PROCEDURE — 71045 X-RAY EXAM CHEST 1 VIEW: CPT

## 2022-02-24 PROCEDURE — 6360000004 HC RX CONTRAST MEDICATION: Performed by: STUDENT IN AN ORGANIZED HEALTH CARE EDUCATION/TRAINING PROGRAM

## 2022-02-24 PROCEDURE — 2580000003 HC RX 258: Performed by: ANESTHESIOLOGY

## 2022-02-24 PROCEDURE — 36415 COLL VENOUS BLD VENIPUNCTURE: CPT

## 2022-02-24 PROCEDURE — 82803 BLOOD GASES ANY COMBINATION: CPT

## 2022-02-24 PROCEDURE — 6370000000 HC RX 637 (ALT 250 FOR IP): Performed by: NURSE PRACTITIONER

## 2022-02-24 PROCEDURE — 93010 ELECTROCARDIOGRAM REPORT: CPT | Performed by: INTERNAL MEDICINE

## 2022-02-24 PROCEDURE — 87205 SMEAR GRAM STAIN: CPT

## 2022-02-24 PROCEDURE — 94761 N-INVAS EAR/PLS OXIMETRY MLT: CPT

## 2022-02-24 PROCEDURE — 3600000004 HC SURGERY LEVEL 4 BASE: Performed by: SURGERY

## 2022-02-24 PROCEDURE — 82140 ASSAY OF AMMONIA: CPT

## 2022-02-24 PROCEDURE — 2700000000 HC OXYGEN THERAPY PER DAY

## 2022-02-24 PROCEDURE — 7100000000 HC PACU RECOVERY - FIRST 15 MIN: Performed by: SURGERY

## 2022-02-24 PROCEDURE — 2500000003 HC RX 250 WO HCPCS: Performed by: NURSE PRACTITIONER

## 2022-02-24 PROCEDURE — 2580000003 HC RX 258: Performed by: NURSE PRACTITIONER

## 2022-02-24 PROCEDURE — 87077 CULTURE AEROBIC IDENTIFY: CPT

## 2022-02-24 PROCEDURE — 6370000000 HC RX 637 (ALT 250 FOR IP): Performed by: STUDENT IN AN ORGANIZED HEALTH CARE EDUCATION/TRAINING PROGRAM

## 2022-02-24 PROCEDURE — 3700000000 HC ANESTHESIA ATTENDED CARE: Performed by: SURGERY

## 2022-02-24 PROCEDURE — 87070 CULTURE OTHR SPECIMN AEROBIC: CPT

## 2022-02-24 PROCEDURE — 7100000001 HC PACU RECOVERY - ADDTL 15 MIN: Performed by: SURGERY

## 2022-02-24 PROCEDURE — 71260 CT THORAX DX C+: CPT

## 2022-02-24 PROCEDURE — 3700000001 HC ADD 15 MINUTES (ANESTHESIA): Performed by: SURGERY

## 2022-02-24 PROCEDURE — 0J9B0ZZ DRAINAGE OF PERINEUM SUBCUTANEOUS TISSUE AND FASCIA, OPEN APPROACH: ICD-10-PCS | Performed by: INTERNAL MEDICINE

## 2022-02-24 PROCEDURE — 2500000003 HC RX 250 WO HCPCS: Performed by: SURGERY

## 2022-02-24 PROCEDURE — 6360000002 HC RX W HCPCS: Performed by: NURSE PRACTITIONER

## 2022-02-24 PROCEDURE — 85027 COMPLETE CBC AUTOMATED: CPT

## 2022-02-24 PROCEDURE — 6370000000 HC RX 637 (ALT 250 FOR IP): Performed by: SURGERY

## 2022-02-24 PROCEDURE — 99231 SBSQ HOSP IP/OBS SF/LOW 25: CPT | Performed by: SURGERY

## 2022-02-24 PROCEDURE — 1200000000 HC SEMI PRIVATE

## 2022-02-24 PROCEDURE — 2709999900 HC NON-CHARGEABLE SUPPLY: Performed by: SURGERY

## 2022-02-24 PROCEDURE — 2500000003 HC RX 250 WO HCPCS: Performed by: ANESTHESIOLOGY

## 2022-02-24 PROCEDURE — 83880 ASSAY OF NATRIURETIC PEPTIDE: CPT

## 2022-02-24 PROCEDURE — 93005 ELECTROCARDIOGRAM TRACING: CPT | Performed by: STUDENT IN AN ORGANIZED HEALTH CARE EDUCATION/TRAINING PROGRAM

## 2022-02-24 PROCEDURE — 3600000014 HC SURGERY LEVEL 4 ADDTL 15MIN: Performed by: SURGERY

## 2022-02-24 PROCEDURE — 83605 ASSAY OF LACTIC ACID: CPT

## 2022-02-24 PROCEDURE — 87635 SARS-COV-2 COVID-19 AMP PRB: CPT

## 2022-02-24 PROCEDURE — 87075 CULTR BACTERIA EXCEPT BLOOD: CPT

## 2022-02-24 PROCEDURE — 6360000002 HC RX W HCPCS: Performed by: SURGERY

## 2022-02-24 PROCEDURE — 94640 AIRWAY INHALATION TREATMENT: CPT

## 2022-02-24 RX ORDER — KETAMINE HYDROCHLORIDE 100 MG/ML
INJECTION, SOLUTION INTRAMUSCULAR; INTRAVENOUS PRN
Status: DISCONTINUED | OUTPATIENT
Start: 2022-02-24 | End: 2022-02-24 | Stop reason: SDUPTHER

## 2022-02-24 RX ORDER — MIDAZOLAM HYDROCHLORIDE 1 MG/ML
INJECTION INTRAMUSCULAR; INTRAVENOUS PRN
Status: DISCONTINUED | OUTPATIENT
Start: 2022-02-24 | End: 2022-02-24 | Stop reason: SDUPTHER

## 2022-02-24 RX ORDER — SODIUM CHLORIDE 9 MG/ML
INJECTION, SOLUTION INTRAVENOUS CONTINUOUS PRN
Status: DISCONTINUED | OUTPATIENT
Start: 2022-02-24 | End: 2022-02-24 | Stop reason: SDUPTHER

## 2022-02-24 RX ORDER — BUPIVACAINE HYDROCHLORIDE 5 MG/ML
INJECTION, SOLUTION EPIDURAL; INTRACAUDAL PRN
Status: DISCONTINUED | OUTPATIENT
Start: 2022-02-24 | End: 2022-02-24 | Stop reason: ALTCHOICE

## 2022-02-24 RX ADMIN — FENTANYL CITRATE 25 MCG: 0.05 INJECTION, SOLUTION INTRAMUSCULAR; INTRAVENOUS at 07:54

## 2022-02-24 RX ADMIN — ASPIRIN 81 MG 81 MG: 81 TABLET ORAL at 07:57

## 2022-02-24 RX ADMIN — SODIUM CHLORIDE, PRESERVATIVE FREE 10 ML: 5 INJECTION INTRAVENOUS at 21:06

## 2022-02-24 RX ADMIN — ACETAMINOPHEN 650 MG: 325 TABLET ORAL at 05:31

## 2022-02-24 RX ADMIN — QUETIAPINE FUMARATE 50 MG: 25 TABLET ORAL at 20:54

## 2022-02-24 RX ADMIN — GABAPENTIN 100 MG: 100 CAPSULE ORAL at 07:56

## 2022-02-24 RX ADMIN — KETAMINE HYDROCHLORIDE 100 MG: 100 INJECTION INTRAMUSCULAR; INTRAVENOUS at 16:50

## 2022-02-24 RX ADMIN — CALCIUM ACETATE 667 MG: 667 CAPSULE ORAL at 07:56

## 2022-02-24 RX ADMIN — GABAPENTIN 100 MG: 100 CAPSULE ORAL at 20:55

## 2022-02-24 RX ADMIN — OXYCODONE 10 MG: 5 TABLET ORAL at 01:24

## 2022-02-24 RX ADMIN — HEPARIN SODIUM 5000 UNITS: 5000 INJECTION INTRAVENOUS; SUBCUTANEOUS at 05:31

## 2022-02-24 RX ADMIN — INSULIN GLARGINE 30 UNITS: 100 INJECTION, SOLUTION SUBCUTANEOUS at 20:58

## 2022-02-24 RX ADMIN — HEPARIN SODIUM 5000 UNITS: 5000 INJECTION INTRAVENOUS; SUBCUTANEOUS at 20:55

## 2022-02-24 RX ADMIN — CEFEPIME 1000 MG: 1 INJECTION, POWDER, FOR SOLUTION INTRAMUSCULAR; INTRAVENOUS at 16:41

## 2022-02-24 RX ADMIN — SODIUM CHLORIDE, PRESERVATIVE FREE 10 ML: 5 INJECTION INTRAVENOUS at 07:56

## 2022-02-24 RX ADMIN — CYCLOBENZAPRINE 5 MG: 10 TABLET, FILM COATED ORAL at 07:56

## 2022-02-24 RX ADMIN — MIDAZOLAM HYDROCHLORIDE 2 MG: 2 INJECTION, SOLUTION INTRAMUSCULAR; INTRAVENOUS at 16:50

## 2022-02-24 RX ADMIN — ISOSORBIDE MONONITRATE 30 MG: 30 TABLET, EXTENDED RELEASE ORAL at 07:56

## 2022-02-24 RX ADMIN — SODIUM CHLORIDE: 9 INJECTION, SOLUTION INTRAVENOUS at 16:41

## 2022-02-24 RX ADMIN — TRAZODONE HYDROCHLORIDE 150 MG: 50 TABLET ORAL at 20:54

## 2022-02-24 RX ADMIN — ACETAMINOPHEN 650 MG: 325 TABLET ORAL at 20:54

## 2022-02-24 RX ADMIN — TORSEMIDE 100 MG: 100 TABLET ORAL at 07:56

## 2022-02-24 RX ADMIN — CLOPIDOGREL BISULFATE 75 MG: 75 TABLET ORAL at 07:56

## 2022-02-24 RX ADMIN — METOPROLOL SUCCINATE 50 MG: 50 TABLET, EXTENDED RELEASE ORAL at 09:10

## 2022-02-24 RX ADMIN — TIOTROPIUM BROMIDE AND OLODATEROL 2 PUFF: 3.124; 2.736 SPRAY, METERED RESPIRATORY (INHALATION) at 11:57

## 2022-02-24 RX ADMIN — PROCHLORPERAZINE EDISYLATE 10 MG: 5 INJECTION INTRAMUSCULAR; INTRAVENOUS at 08:24

## 2022-02-24 RX ADMIN — DULOXETINE HYDROCHLORIDE 60 MG: 60 CAPSULE, DELAYED RELEASE ORAL at 07:56

## 2022-02-24 RX ADMIN — METRONIDAZOLE 500 MG: 250 TABLET ORAL at 05:31

## 2022-02-24 RX ADMIN — IOPAMIDOL 75 ML: 755 INJECTION, SOLUTION INTRAVENOUS at 21:26

## 2022-02-24 RX ADMIN — METRONIDAZOLE 500 MG: 250 TABLET ORAL at 20:54

## 2022-02-24 RX ADMIN — OXYCODONE 10 MG: 5 TABLET ORAL at 10:52

## 2022-02-24 RX ADMIN — PRAVASTATIN SODIUM 40 MG: 40 TABLET ORAL at 07:57

## 2022-02-24 ASSESSMENT — PAIN DESCRIPTION - LOCATION
LOCATION: BACK
LOCATION: RECTUM
LOCATION: BACK;HIP

## 2022-02-24 ASSESSMENT — PAIN DESCRIPTION - PROGRESSION
CLINICAL_PROGRESSION: NOT CHANGED
CLINICAL_PROGRESSION: NOT CHANGED

## 2022-02-24 ASSESSMENT — PULMONARY FUNCTION TESTS
PIF_VALUE: 1
PIF_VALUE: 0
PIF_VALUE: 0
PIF_VALUE: 1
PIF_VALUE: 0
PIF_VALUE: 1
PIF_VALUE: 0
PIF_VALUE: 1
PIF_VALUE: 0
PIF_VALUE: 1
PIF_VALUE: 1

## 2022-02-24 ASSESSMENT — PAIN SCALES - GENERAL
PAINLEVEL_OUTOF10: 8
PAINLEVEL_OUTOF10: 10
PAINLEVEL_OUTOF10: 0
PAINLEVEL_OUTOF10: 5
PAINLEVEL_OUTOF10: 7
PAINLEVEL_OUTOF10: 8
PAINLEVEL_OUTOF10: 3
PAINLEVEL_OUTOF10: 8

## 2022-02-24 ASSESSMENT — ENCOUNTER SYMPTOMS: SHORTNESS OF BREATH: 1

## 2022-02-24 ASSESSMENT — PAIN DESCRIPTION - DESCRIPTORS
DESCRIPTORS: ACHING

## 2022-02-24 ASSESSMENT — PAIN DESCRIPTION - ORIENTATION
ORIENTATION: RIGHT;LEFT
ORIENTATION: RIGHT;LEFT;MID;LOWER

## 2022-02-24 ASSESSMENT — PAIN DESCRIPTION - FREQUENCY
FREQUENCY: CONTINUOUS

## 2022-02-24 ASSESSMENT — PAIN DESCRIPTION - PAIN TYPE
TYPE: ACUTE PAIN;SURGICAL PAIN
TYPE: ACUTE PAIN
TYPE: CHRONIC PAIN

## 2022-02-24 ASSESSMENT — PAIN DESCRIPTION - ONSET
ONSET: ON-GOING
ONSET: ON-GOING

## 2022-02-24 ASSESSMENT — PAIN - FUNCTIONAL ASSESSMENT: PAIN_FUNCTIONAL_ASSESSMENT: PREVENTS OR INTERFERES WITH ALL ACTIVE AND SOME PASSIVE ACTIVITIES

## 2022-02-24 NOTE — PROGRESS NOTES
MD notified: Fyi Sepsis score is 41.01 currently. Pt has had chills, Bp 121/62, HR 92, temp 100.6. Pain high throughout night, pt verbalizes doesn't feel well, pale in color, thirsty with fluid restriction, 2 bouts of vomiting today, and now slightly confused at times.

## 2022-02-24 NOTE — PROGRESS NOTES
Hospitalist Progress Note      PCP: Maritza Luz MD    Date of Admission: 2/21/2022    Chief Complaint: perianal pain and dyspnea    Hospital Course: In brief this is a 46 yoM with ESRD and TIIDM presenting with perianal pain concerning for cellulitis and dyspnea likely from volume overload though alternative etiologies being explored. Subjective: pain still an issue. Pt quite drowsy during exam but non focal and answering questions appropriately. Says dyspnea is worsening. Endorses history of PE but I did not find this on previous images.  He states he had a CVA in the past. History of afib /flutter not on anticoagualtion      Medications:  Reviewed    Infusion Medications    dextrose      sodium chloride       Scheduled Medications    epoetin siddharth-epbx  10,000 Units IntraVENous Q MWF    cefepime  1,000 mg IntraVENous Q24H    metroNIDAZOLE  500 mg Oral 3 times per day    acetaminophen  650 mg Oral 3 times per day    tiotropium-olodaterol  2 puff Inhalation Daily    aspirin  81 mg Oral Daily    calcium acetate  667 mg Oral TID WC    clopidogrel  75 mg Oral Daily    DULoxetine  60 mg Oral Daily    gabapentin  100 mg Oral TID    insulin glargine  30 Units SubCUTAneous Nightly    isosorbide mononitrate  30 mg Oral Daily    lisinopril  10 mg Oral Daily    metoprolol succinate  50 mg Oral Daily    QUEtiapine  50 mg Oral Nightly    torsemide  100 mg Oral Daily    traZODone  150 mg Oral Nightly    linaclotide  145 mcg Oral QAM AC    pravastatin  40 mg Oral Daily    insulin lispro  0-18 Units SubCUTAneous TID WC    insulin lispro  0-9 Units SubCUTAneous Nightly    sodium chloride flush  5-40 mL IntraVENous 2 times per day    heparin (porcine)  5,000 Units SubCUTAneous 3 times per day    vancomycin (VANCOCIN) intermittent dosing (placeholder)   Other RX Placeholder     PRN Meds: bupivacaine (PF), heparin (porcine), prochlorperazine, oxyCODONE, fentanNYL, oxyCODONE, albuterol sulfate HFA, cyclobenzaprine, ipratropium-albuterol, glucose, glucagon (rDNA), dextrose, sodium chloride flush, sodium chloride, dextrose bolus (hypoglycemia) **OR** dextrose bolus (hypoglycemia)      Intake/Output Summary (Last 24 hours) at 2/24/2022 1756  Last data filed at 2/24/2022 1705  Gross per 24 hour   Intake 480 ml   Output 5 ml   Net 475 ml       Physical Exam Performed:    /65   Pulse 85   Temp 98.6 °F (37 °C) (Temporal)   Resp 13   Ht 5' 9\" (1.753 m)   Wt 261 lb 0.4 oz (118.4 kg)   SpO2 99%   BMI 38.55 kg/m²     General appearance: drowsy during exam but arouses and alert and oriented x3. Neuro: non focal  HEENT: Pupils equal, round, and reactive to light. Conjunctivae/corneas clear. Neck: Supple, with full range of motion. Respiratory:  Normal respiratory effort. Clear to auscultation, bilaterally without Rales/Wheezes/Rhonchi. On 3L with 100% sats  Cardiovascular: Regular rate and rhythm with normal S1/S2 without murmurs, rubs or gallops. Abdomen: Soft, non-tender, non-distended with normal bowel sounds. Musculoskeletal: No clubbing, cyanosis or edema bilaterally. Full range of motion without deformity. Skin: perianal induration with improvement, no purulent drainage or abscess seen  Neurologic:  Neurovascularly intact without any focal sensory/motor deficits.  Cranial nerves: II-XII intact, grossly non-focal.  Psychiatric: Alert and oriented, thought content appropriate, normal insight  Capillary Refill: Brisk,3 seconds, normal   Peripheral Pulses: +2 palpable, equal bilaterally       Labs:   Recent Labs     02/22/22  0541 02/23/22  0800 02/24/22  0311   WBC 16.3* 16.0* 13.0*   HGB 8.0* 7.6* 7.9*   HCT 24.6* 23.5* 24.1*    267 299     Recent Labs     02/22/22  0541 02/23/22  0800 02/24/22  0311   * 131* 130*   K 5.0 5.9* 4.9   CL 91* 92* 90*   CO2 18* 18* 23   BUN 88* 99* 57*   CREATININE 9.0* 11.3* 7.5*   CALCIUM 8.4 8.4 8.7     Recent Labs     02/22/22  0541 02/23/22  0800 02/24/22  0311   AST 10* 12* 10*   ALT 7* 8* 8*   BILITOT 0.3 <0.2 <0.2   ALKPHOS 98 125 105     No results for input(s): INR in the last 72 hours. Recent Labs     02/21/22  2310 02/22/22  0541 02/22/22  1628   TROPONINI 0.15* 0.17* 0.18*       Urinalysis:      Lab Results   Component Value Date    NITRU Negative 02/22/2022    WBCUA 3-5 02/22/2022    BACTERIA Rare 02/22/2022    RBCUA 0-2 02/22/2022    BLOODU TRACE-INTACT 02/22/2022    SPECGRAV 1.015 02/22/2022    GLUCOSEU 250 02/22/2022       Radiology:  XR CHEST PORTABLE   Final Result   No acute process. CT PELVIS WO CONTRAST Additional Contrast? None   Final Result   1. Small 14 mm ovoid fluid collection in the subcutaneous fat of the right   perineum unchanged from at least 01/01/2021. Minimal adjacent subcutaneous   fat stranding. This may represent a sebaceous cyst with superimposed   infection not excluded. 2. No soft tissue gas or other drainable fluid collection. 3. No acute osseous abnormality. XR CHEST PORTABLE   Final Result   Vascular congestion. No consolidation. Stable cardiomegaly. Assessment/Plan:    Active Hospital Problems    Diagnosis     Acute on chronic combined systolic and diastolic heart failure (HCC) [I50.43]      Priority: High    Dyslipidemia [E78.5]      Priority: High    Type 2 diabetes, uncontrolled, with neuropathy ([facility] [address] [E11.40, E11.65]      Priority: High    Essential hypertension [I10]      Priority: Medium    SIRS (systemic inflammatory response syndrome) ([facility]) [R65.10]     ESRD (end stage renal disease) ([facility] [address] [N18.6]     Obesity (BMI 30-39. 9) [E66.9]     Sepsis without acute organ dysfunction (HCC) [A41.9]      Perianal cellulitis w/ sepsis (SIRS criteira WBC 13.1, )  - CT pelvis revealed: small 14 mm ovoid fluid collection in subcutaneous fat of the right perineum unchanged from atleast 01/01/2021. Minimal adjacent subcutaneous fat stranding.  This may represent a sebaceous cyst with superimposed infection. No soft tissue gas or other drainable fluid collection. - continue broad Gram negative, positive and anaerobic coverage for now. Unsure if MRSA coverage needed in light of non purulent presentation but will continue for now. If surgery will tailor abcx based on culture  - pain regimen adjusted: acetaminophen scheduled, oxycodone PO for breakthrough pain and IV opioids if PO insufficient.  - general surgery following may go to OR today    Dyspnea without hypoxia- likely volume overload from inadequate iHD based on initial CXR but sxs seem to be worsening. Pulmonary exam non focal. Ddimer acutely elevated > 2000.   - continue iHD with nephrology assistance. - overnight pulse ox study ordered  - CTA PE protocol ordered    TIIDM- a1c 9.4 on 1/12/2022  - continue basal and SSI    Afib/aflutter- not on anticoagulation for unclear reasons. Perhaps iHD, non compliance and morbid obesity are complicating factors? Pt unclear as to why. I do not see it documented in cardiology notes. CHADSVASC 6.  - Will consult cardiology 02/25    Essential HTN  -continue metoprolol and lisinopril     HLD  -continue pravastatin    Elevated troponin, 0.15-->0.17 on admission  -no c/o chest pain  -likely 2/2 demand ischemia r/t ESRD  -trend troponin flat  -tele monitoring    Acute on chronic CHF, stable  -strict I&O  -daily weights  -continue demadex     Chronic normocytic anemia likely from anemia of chronic disease  -no s/s of bleeding at this time     Anxiety depression  -continue home meds  -mood stable at this time    Obesity  With Body mass index is 39.3 kg/m². Complicating assessment and treatment. Placing patient at risk for multiple co-morbidities as well as early death and contributing to the patient's presentation.  Counseled on weight loss     DVT Prophylaxis: heparin ppx  Diet: Diet NPO can advance diet post procedure  Code Status: Full Code    PT/OT Eval Status: not ordered    Family updated 02/24    Dispo - pending clinical course    Humaira Anthony MD

## 2022-02-24 NOTE — PROGRESS NOTES
Pt with ESRD and being treated for infection currently nauseous and throwing up. No antiemetics ordered. Pt vomited after being given oral Tylenol and Flagyl. Pt requesting antiemetic. Should flagyl be given IV, and pt BP has been low post dialysis can we have an IV med that wont lower BP and also help with fevers in place of Tylenol?

## 2022-02-24 NOTE — PROGRESS NOTES
A: Assessment completed and documented, patient is now awake and was able to push the oral airway with his tongue. Call light is in reach, discussed plan of care with patient who agreed, patient is alert to person and is forgetful, trying to get out of bed and has to be reminded to lay back down, will reorient but is still forgetful, bed exit alarm is on for added safety.

## 2022-02-24 NOTE — OP NOTE
Date of Surgery: 2/24/22    Preop Dx: Infected Perineal Cyst    Postop Dx:  same    Procedure: Incision and drainage of infected perineal cyst    Surgeon:  Fco Strong    Assistant:      Anesthesia:  Mac. local    EBL:   <50ml    Specimen:  culture    Complications: none    Drains/Lines:  none    Indications:  43 yo with pain at site of perineal cyst and fevers    Description:  Patient was given adequate description of the risks and rewards of the procedure, including bleeding, infection, recurrence and freely consented. He was given appropriate antibiotics and brought to the OR where mac anesthesia was induced. He was placed in lateral position. Prepped and draped in usual sterile fashion. Local anesthetic instilled. Incision made with #10 blade with return of small amount of fluid. Cultures taken. Hemostasis achieved and irrigated. Packed with 1/2 inch iodoform guaze. Outer gauze dressing placed. All suture, sponge and instrument count correct times two at end of case. Transferred to PACU in stable condition.     Christophe Paulson MD

## 2022-02-24 NOTE — PROGRESS NOTES
Pt arrived to [facility] via transport from unit. CHG bath preformed, IV checked and NS IVF initiated. Consent obtained and pt stable in bed 4. Pts belongings-shorts and wedding ring labeled and placed in locker.

## 2022-02-24 NOTE — PROGRESS NOTES
D: Patient here from OR via bed, taken to bay 4 in PACU, all current drips, treatments including presence of an oral airway, skin issues, and plan of care were reviewed by both RN's, patients care transferred with patient in stable condition.

## 2022-02-24 NOTE — PROGRESS NOTES
A: Telephone report given Natalia Peterson, all current drips, treatments, skin issues, and plan of care were reviewed by both RN's, patients care transferred with patient in stable condition.

## 2022-02-24 NOTE — ANESTHESIA PRE PROCEDURE
Department of Anesthesiology  Preprocedure Note       Name:  Cecilia Dunn   Age:  46 y.o.  :  1970                                          MRN:  [id number]         Date:  2022      Surgeon:  Linda Rehman MD    Procedure:  PERINEAL ABCESS DRAINAGE    HPI:  46 y.o. male, with PMH of ESRD, CHF, HTN, HLD, DM and obesity, who presented to [facility] with shortness of breath and buttock pain. History obtained from the patient and review of EMR. The patient stated earlier this morning he began to experience shortness of breath. He does have end-stage renal disease and receives HD on [facility]. His nephrologist is Dr. Luke Garcia. The patient stated he went to dialysis this morning and was only able to tolerate roughly 30 minutes of the run due to worsening shortness of breath and mild chest tightness. He stated his shortness of breath continued throughout the day so he went to the emergency room for further evaluation. The patient stated he does follow with Dr. Dwayne Gruber from pulmonology and believes he needs oxygen at home, but does not have any at this time. Upon arrival to the emergency room the patient was 97% on room air. Nephrology has been consulted. The patient also stated that he has pain in the center of his butt. He stated he has known that he has had an abscess there for a long time, but it has not bothered him much. However, the patient stated for the last 3 days he feels like it is getting bigger and it is becoming more painful to the point he cannot lie on his back. In the emergency room a CT pelvis was obtained that revealed a small14 mm ovoid fluid collection in subcutaneous fat of the right perineum unchanged from atleast 2021.      EK2022 08:45:47  Atrial flutter with variable A-V block  113  Abnormal ECG  When compared with ECG of 2022 17:57,  Atrial flutter has replaced Sinus rhythm  Nonspecific T wave abnormality now evident in Inferior leads    ECHO:  Limited only f/u for LVEF and RVF. The left ventricular systolic function is mildly reduced with an ejection   fraction of 40-45 %. There is hypokinesis of the apex and inferior walls. There is a very small circumferential pericardial effusion noted. No tamponade physiology. The right ventricle is normal in size and function. Procedures Performed:   1. Left heart catheterization  2. Selective left and right coronary angiogram  3. PCI of the RCA with PATRICIA     Procedure Findings:  1. 99% mid RCA  2. 60-70% CIRC and DIAG  3. Elevated left heart hemodynamics              ~LVEDP 30          Medications prior to admission:    pravastatin (PRAVACHOL) 40 MG tablet Take 1 tablet by mouth daily   metoprolol succinate (TOPROL XL) 50 MG extended release tablet Take 1 tablet by mouth daily   lisinopril (PRINIVIL;ZESTRIL) 10 MG tablet Take 1 tablet by mouth daily   clopidogrel (PLAVIX) 75 MG tablet Take 1 tablet by mouth daily   isosorbide mononitrate (IMDUR) 30 MG extended release tablet Take 1 tablet by mouth daily   torsemide (DEMADEX) 100 MG tablet Take 1 tablet by mouth daily   oxyCODONE-acetaminophen (PERCOCET) 7.5-325 MG per tablet Take 1 tablet by mouth every 6 hours as needed (pain) for up to 30 days.    QUEtiapine (SEROQUEL) 50 MG tablet TAKE 1 TABLET BY MOUTH IN THE EVENING   cyclobenzaprine (FLEXERIL) 10 MG tablet TAKE 1 TABLET BY MOUTH EVERY 8 HOURS AS NEEDED   aspirin 81 MG chewable tablet Take 1 tablet by mouth daily   insulin glargine (BASAGLAR KWIKPEN) 100 UNIT/ML injection pen Inject 30 Units into the skin nightly    MG capsule TAKE 1 CAPSULE BY MOUTH TWICE DAILY   DULoxetine (CYMBALTA) 60 MG extended release capsule TAKE 1 CAPSULE BY MOUTH EVERY DAY   traZODone (DESYREL) 150 MG tablet TAKE ONE (1) TABLET BY MOUTH NIGHTLY   B Complex-C-Folic Acid (VIRT-CAPS) 1 MG CAPS TAKE 1 CAPSULE BY MOUTH EVERY DAY   Calcium Acetate, Phos Binder, 667 MG CAPS TAKE 1 CAPSULE BY MOUTH THREE TIMES DAILY WITH MEALS   LINZESS 145 MCG capsule TAKE 1 CAPSULE BY MOUTH EVERY MORNING BEFORE BREAKFAST   nitroGLYCERIN (NITROSTAT) 0.4 MG SL tablet DISSOLVE 1 TABLET UNDER THE TONGUE AS NEEDED FOR CHEST PAIN EVERY 5 MINUTES UP TO 3 TIMES. IF NO RELIEF CALL 911. insulin aspart (NOVOLOG FLEXPEN) 100 UNIT/ML injection pen Inject 20 Units into the skin 3 times daily (before meals)   vitamin D (ERGOCALCIFEROL) 28150 units CAPS capsule TK 1 C PO WEEKLY   Tiotropium Bromide-Olodaterol (STIOLTO RESPIMAT)  Inhale 2 puffs into the lungs daily   Polyethylene Glycol 3350 GRAN    albuterol sulfate  (90 Base) MCG/ACT inhaler Inhale 2 puffs into the lungs every 6 hours as needed for Wheezing   ipratropium-albuterol (DUONEB) 0.5-2.5 (3) MG/3ML SOLN nebulizer soln Inhale 3 mLs into the lungs every 6 hours as needed for Shortness of Breath   calcium carbonate (TUMS) 500 MG chewable tablet Take 1 tablet by mouth 3 times daily as needed for Heartburn.    pantoprazole (PROTONIX) 40 MG tablet TAKE 1 TABLET BY MOUTH EVERY MORNING BEFORE BREAKFAST   fluticasone (FLONASE) 50 MCG/ACT nasal spray SHAKE LIQUID AND USE 2 SPRAYS IN EACH NOSTRIL DAILY   gabapentin (NEURONTIN) 100 MG capsule TAKE 1-2 CAPSULES BY MOUTH THREE TIMES A DAY     Current medications:     heparin (porcine) injection 4,500 Units  4,500 Units IntraCATHeter PRN    epoetin siddharth-epbx (RETACRIT) injection 10,000 Units  10,000 Units IntraVENous Q MWF    prochlorperazine (COMPAZINE) injection 10 mg  10 mg IntraVENous Q6H PRN    cefepime (MAXIPIME) 1000 mg IVPB minibag  1,000 mg IntraVENous Q24H    metroNIDAZOLE (FLAGYL) tablet 500 mg  500 mg Oral 3 times per day    acetaminophen (TYLENOL) tablet 650 mg  650 mg Oral 3 times per day    oxyCODONE (ROXICODONE) immediate release tablet 10 mg  10 mg Oral Q6H PRN    fentaNYL (SUBLIMAZE) injection 25 mcg  25 mcg IntraVENous Q2H PRN    tiotropium-olodaterol (STIOLTO) 2.5-2.5 MCG/ACT inhaler 2 puff 2 puff Inhalation Daily    oxyCODONE (ROXICODONE) immediate release tablet 5 mg  5 mg Oral Q6H PRN    albuterol sulfate  (90 Base) MCG/ACT inhaler 2 puff  2 puff Inhalation Q6H PRN    aspirin chewable tablet 81 mg  81 mg Oral Daily    calcium acetate (PHOSLO) capsule 667 mg  667 mg Oral TID WC    clopidogrel (PLAVIX) tablet 75 mg  75 mg Oral Daily    cyclobenzaprine (FLEXERIL) tablet 5 mg  5 mg Oral TID PRN    DULoxetine (CYMBALTA) extended release capsule 60 mg  60 mg Oral Daily    gabapentin (NEURONTIN) capsule 100 mg  100 mg Oral TID    insulin glargine (LANTUS) injection vial 30 Units  30 Units SubCUTAneous Nightly    isosorbide mononitrate (IMDUR) extended release tablet 30 mg  30 mg Oral Daily    ipratropium-albuterol (DUONEB) nebulizer solution 3 mL  1 vial Inhalation Q6H PRN    lisinopril (PRINIVIL;ZESTRIL) tablet 10 mg  10 mg Oral Daily    metoprolol succinate (TOPROL XL) extended release tablet 50 mg  50 mg Oral Daily    QUEtiapine (SEROQUEL) tablet 50 mg  50 mg Oral Nightly    torsemide (DEMADEX) tablet 100 mg  100 mg Oral Daily    traZODone (DESYREL) tablet 150 mg  150 mg Oral Nightly    linaclotide (LINZESS) capsule 145 mcg  145 mcg Oral QAM AC    pravastatin (PRAVACHOL) tablet 40 mg  40 mg Oral Daily    insulin lispro (HUMALOG) injection vial 0-18 Units  0-18 Units SubCUTAneous TID WC    insulin lispro (HUMALOG) injection vial 0-9 Units  0-9 Units SubCUTAneous Nightly    glucose (GLUTOSE) 40 % oral gel 15 g  15 g Oral PRN    glucagon (rDNA) injection 1 mg  1 mg IntraMUSCular PRN    dextrose 5 % solution  100 mL/hr IntraVENous PRN    heparin (porcine) injection 5,000 Units  5,000 Units SubCUTAneous 3 times per day    dextrose bolus (hypoglycemia) 10% 125 mL  125 mL IntraVENous PRN       dextrose bolus (hypoglycemia) 10% 250 mL  250 mL IntraVENous PRN    vancomycin (VANCOCIN) intermittent dosing (placeholder)   Other RX Placeholder     Allergies:     Morphine Nausea And Vomiting     Problem List:     Sepsis without acute organ dysfunction ([facility])    Asthma-COPD overlap syndrome ([facility]    CAD S/P percutaneous coronary angioplasty    PVD (peripheral vascular disease) ([facility]    Bicuspid aortic valve    Bilateral hilar adenopathy syndrome    Claudication in peripheral vascular disease ([facility]    Essential hypertension    Diabetic neuropathy (HCC)    Type 2 diabetes, uncontrolled, with neuropathy ([facility]    Passive smoke exposure    Depression with anxiety    Pneumonia of right upper lobe due to infectious organism    Obesity (BMI 30-39. 9)    ZAINAB on CPAP    Degeneration of lumbar or lumbosacral intervertebral disc    Lumbar radiculopathy    Lumbosacral spondylosis without myelopathy    Biliary colic    Symptomatic cholelithiasis    Gastroparesis due to DM ([facility])    Angina, class IV ([facility])    Dyspnea    Dyslipidemia    Acute on chronic combined systolic and diastolic heart failure ([facility])    Ischemic cardiomyopathy    Tobacco abuse    CVA (cerebral vascular accident) ([facility]    History of CVA (cerebrovascular accident)    Type 2 diabetes mellitus without complication, without long-term current use of insulin ([facility])    ZAINAB (obstructive sleep apnea)    Pleural effusion    Chronic anemia    Nonrheumatic aortic valve stenosis    Mucus plugging of bronchi    Hemodialysis-associated hypotension    ESRD (end stage renal disease) ([facility])    Hypotension due to drugs    Acute diastolic CHF (congestive heart failure) ([facility])    Neuromuscular disorder ([facility])    Renovascular hypertension    Mixed hyperlipidemia    Cigarette nicotine dependence in remission    Pulmonary edema    Fluid overload    Anemia of chronic disease    SOB (shortness of breath) on exertion    Steal syndrome of dialysis vascular access ([facility])    Chronic, continuous use of opioids    Chronic bronchitis ([facility])    Nasal congestion    Hypercholesteremia    Bradycardia    S/P TAVR (transcatheter aortic valve replacement)    Syncope and collapse    Atrial fibrillation (HCC)    Bilateral leg weakness    GBS (Guillain-Hospers syndrome) ([facility])    Sinus pause    Weakness of both lower extremities    Sinus bradycardia    Ataxia    Peripheral vascular occlusive disease (HCC)    Cellulitis of right foot    Iliac artery occlusion, right ([facility])    Cellulitis and abscess of hand    Type 2 diabetes mellitus with hyperglycemia (HCC)    Acute encephalopathy    Acute hypoxemic respiratory failure ([facility])    Acute cerebrovascular accident (CVA) ([facility]    Speech problem    Urinary tract infection with hematuria    Respiratory failure with hypoxia (HCC)    Acute respiratory failure with hypercapnia ([facility])    Acute pulmonary edema ([facility])    Obesity, Class II, BMI 35-39.9    Grade II diastolic dysfunction    Shock circulatory ([facility])    Smoker    Normocytic normochromic anemia    NSTEMI (non-ST elevated myocardial infarction) ([facility])    SIRS (systemic inflammatory response syndrome) ([facility])     Past Medical History:     Ambulatory dysfunction     walker for long distances, SOB with distance    Aortic stenosis     echo 2017    Arthritis     hands and hips    Asthma     Bilateral hilar adenopathy syndrome 6/3/2013    CAD (coronary artery disease)     Dr. Christopher Cardona ([facility] 04/19/2019    EF= 43%    CHF (congestive heart failure) ([facility])     Chronic pain     COPD (chronic obstructive pulmonary disease) ([facility]     pulmonology Dr. Joseline Brown    Depression     Diabetes mellitus ([facility]     borderline    Difficult intravenous access     Emphysema of lung ([facility]     ESRD (end stage renal disease) on dialysis ([facility]     MWF    Fear of needles     Gastric ulcer     GERD (gastroesophageal reflux disease)     Heart valve problem     bicuspic valve    Hemodialysis patient ([facility]     History of spinal fracture     work incident    Hx of blood clots     Bilateral lower extremities; stents in place  Hyperlipidemia     Hypertension     MI (myocardial infarction) ([facility] 2019    has had 9 MIs. 2019 was the last    Neuromuscular disorder ([address]     due to CVA    Numbness and tingling in left arm     from fistula    Pneumonia     PONV (postoperative nausea and vomiting)     Prolonged emergence from general anesthesia     States requires more medication than most people    Sleep apnea     Uses CPAP    Stroke ([facility]     7mm thalamic cva 2017 deficts left side, left side weakness    TIA (transient ischemic attack)     Unspecified diseases of blood and blood-forming organs      Past Surgical History:     AORTIC VALVE REPLACEMENT N/A 10/15/2019    TRANSCATHETER AORTIC VALVE REPLACEMENT FEMORAL APPROACH performed by Colvin Lombard, MD at [address] [place] [address] Right 7/2/2019    PERITONEAL DIALYSIS CATHETER REMOVAL performed by Elias Garner MD at [facility] COLONOSCOPY  2/29/2015    WN    CORONARY ANGIOPLASTY WITH STENT PLACEMENT  05/26/15    CYST REMOVAL  08/14/2013    EXCISION CYSTS, NECK X2 AND ABDOMINAL benign    DIAGNOSTIC CARDIAC CATH LAB PROCEDURE      DIALYSIS FISTULA CREATION Left 10/30/2017    LEFT BRACHIAL CEPHALIC FISTULA    DIALYSIS FISTULA CREATION Left 3/27/2019    LIGATION  AV FISTULA performed by Tona Dsouza MD at [facility]. [address], COLON, DIAGNOSTIC      OTHER SURGICAL HISTORY  02/01/2017    laparoscopic cholecystectomy with intraoperative cholangiogram    OTHER SURGICAL HISTORY  2018    PORT PLACEMENT  - vas cath    OTHER SURGICAL HISTORY Bilateral 06/26/2018    laprascopic peritoneal dialysis catheter placement    OTHER SURGICAL HISTORY Right 09/2018    peritoneal dialysis port placed on right side of abdomen    OTHER SURGICAL HISTORY  05/28/2019    PTA/Stenting R External Iliac artery    MD LAP INSERTION TUNNELED INTRAPERITONEAL CATHETER N/A 9/21/2018    LAPAROSCOPIC PERITONEAL DIALYSIS CATHETER REPLACEMENT performed by Bridger López Felicia Hussein MD at [place] [address] ENDOSCOPY  2016    UPPER GASTROINTESTINAL ENDOSCOPY  2017    possible candida, otherwise normal appearing    VASCULAR SURGERY  aprx 2 years ago    2 stents placed, each side of groin     Social History:     Smoking status: Former Smoker     Packs/day: 0.50     Years: 33.00     Pack years: 16.50     Types: Cigarettes     Quit date: 2020     Years since quittin.8    Smokeless tobacco: Never Used    Tobacco comment: States quit 2021   Substance Use Topics    Alcohol use: Not Currently     Alcohol/week: 0.0 standard drinks     Comment: occ                                Counseling given: Not Answered  Comment: States quit 2021    Vital Signs (Current):    22 0853 22 1158 22 1400 22 1539   BP: (!) 113/55  (!) 98/45 102/62   Pulse: 85  86 86   Resp: 18  16 16   Temp:   98.8 °F (37.1 °C)    TempSrc:   Temporal    SpO2: 97% 93% 98% 95%              Height: 5' 9\" (1.753 m) (22) Weight: 261 lb 0.4 oz (118.4 kg) (22)   BMI: 38.55                          BP Readings from Last 3 Encounters:   22 102/62   02/10/22 (!) 162/78   22 (!) 148/88     NPO Status: Time of last liquid consumption: 0000                        Time of last solid consumption: 0000                        Date of last liquid consumption: 22                        Date of last solid food consumption: 22    BMI:   Wt Readings from Last 3 Encounters:   22 261 lb 0.4 oz (118.4 kg)   02/10/22 257 lb (116.6 kg)   22 262 lb 5.6 oz (119 kg)     Body mass index is 38.55 kg/m².     CBC:    WBC 13.0 2022    HGB 7.9 2022    HCT 24.1 2022     2022     CMP:     2022    K 4.9 2022    CL 90 2022    CO2 23 2022    BUN 57 2022    CREATININE 7.5 2022    GFRAA 9 2022    GLUCOSE 133 2022    PROT 6.6

## 2022-02-24 NOTE — PROGRESS NOTES
Progress Note    HISTORY     CC:  Pain            We are following for ESRD         Subjective/   HPI:  Patient says that pain is worse today. Also with fevers despite IV antibiotics. No problems with dialysis. He has been very sleepy. ABG with mild CO2 retention     ROS:  Constitutional:  + fevers, No Chills, + weakness  Cardiovascular:  No palpations, no edema  Respiratory:  No wheezing, no cough  Skin:  No rash, no itching  :  No hematuria, no dysuria     Social Hx:  No Family at the bedside     Past Medical and Surgical History:  - Reviewed, no changes     EXAM       Objective/     Vitals:    02/24/22 0843 02/24/22 0853 02/24/22 1158 02/24/22 1400   BP:  (!) 113/55  (!) 98/45   Pulse:  85  86   Resp: 24 18  16   Temp:    98.8 °F (37.1 °C)   TempSrc:    Temporal   SpO2:  97% 93% 98%   Weight:       Height:         24HR INTAKE/OUTPUT:      Intake/Output Summary (Last 24 hours) at 2/24/2022 1500  Last data filed at 2/23/2022 1925  Gross per 24 hour   Intake 480 ml   Output --   Net 480 ml     Constitutional:  Ill appearing, NAD  Eyes:  Pupils reactive, sclera clear   Neck:  Normal thyroid, no masses   Cardiovascular:  Regular, no rub  Respiratory:  No distress, no wheezing  Psychiatry:  Flat mood/affect, somnolent   Abdomen: +bs, soft, nt, no masses   Musculoskeletal: No LE edema, no clubbing   Lymphatics:  No LAD in neck, no supraclavicular nodes   Access:  Left [facility]       MEDICAL DECISION MAKING       Data/  Recent Labs     02/22/22  0541 02/23/22  0800 02/24/22  0311   WBC 16.3* 16.0* 13.0*   HGB 8.0* 7.6* 7.9*   HCT 24.6* 23.5* 24.1*   MCV 90.3 90.7 89.1    267 299     Recent Labs     02/22/22  0541 02/23/22  0800 02/24/22  0311   * 131* 130*   K 5.0 5.9* 4.9   CL 91* 92* 90*   CO2 18* 18* 23   GLUCOSE 167* 78 133*   BUN 88* 99* 57*   CREATININE 9.0* 11.3* 7.5*   LABGLOM 6* 5* 8*   GFRAA 7* 6* 9*       Assessment/     ESRD:  On dialysis MWF at [facility]. Failed PD.   Not transplant candidate. Had fistula ligated due to dialysis associated steal.  HD today with 3 kg UF. Post weight was 118 kg. He has not reached his target of 116 kg in over 1 months      Anemia of chronic disease-CKD:  - Hb is below target, on DAVON at the dialysis unit     Hyponatremia:  Due to volume overload in setting of ESRD    Hyperkalemia:  Due to ESRD, had dialysis today       Perineal Pain:  Surgery following and at this point no surgical intervention required. Cellulitis with no overt abscess noted on CT.   On broad spectrum IV antibiotics and pain medications    Plan/     Labs on dialysis days to reduce lab draws  ESRD per regular schedule  EPO with dialysis   Antibiotics  Possible I&D per surgery     -----------------------------  Saba Young M.D.   [facility]

## 2022-02-24 NOTE — PROGRESS NOTES
[facility]    PATIENT NAME: Amos Villareal     TODAY'S DATE: 2/24/2022    CHIEF COMPLAINT: perineal pain    INTERVAL HISTORY/HPI:    Pt with continued pain and fevers, some nausea no emesis. REVIEW OF SYSTEMS:  Pertinent positives and negatives as per interval history section    OBJECTIVE:  VITALS:  BP (!) 98/45   Pulse 86   Temp 98.8 °F (37.1 °C) (Temporal)   Resp 16   Ht 5' 9\" (1.753 m)   Wt 261 lb 0.4 oz (118.4 kg)   SpO2 98%   BMI 38.55 kg/m²     INTAKE/OUTPUT:    I/O last 3 completed shifts: In: 56 [P.O.:480; I.V.:10]  Out: -   No intake/output data recorded. CONSTITUTIONAL:  awake and alert  LUNGS:  Respirations easy and unlabored,  CARD:  regular rate   ABDOMEN:  normal bowel sounds, soft, non-distended, non-tender   Skin:  Perineum with tenderness at site of cyst, no erythema    Data:  CBC: Recent Labs     02/22/22  0541 02/23/22  0800 02/24/22 0311   WBC 16.3* 16.0* 13.0*   HGB 8.0* 7.6* 7.9*   HCT 24.6* 23.5* 24.1*    267 299     BMP:    Recent Labs     02/22/22  0541 02/23/22  0800 02/24/22 0311   * 131* 130*   K 5.0 5.9* 4.9   CL 91* 92* 90*   CO2 18* 18* 23   BUN 88* 99* 57*   CREATININE 9.0* 11.3* 7.5*   GLUCOSE 167* 78 133*     Hepatic:   Recent Labs     02/22/22  0541 02/23/22  0800 02/24/22  0311   AST 10* 12* 10*   ALT 7* 8* 8*   BILITOT 0.3 <0.2 <0.2   ALKPHOS 98 125 105     Mag:    No results for input(s): MG in the last 72 hours. Phos:   No results for input(s): PHOS in the last 72 hours. INR: No results for input(s): INR in the last 72 hours. Radiology Review:  *Imaging personally reviewed by me. NA      ASSESSMENT AND PLAN:  45 yo with perineal cyst  1.   Given fevers and continued pain will plan for I&D today     Electronically signed by Baudilio Keller MD     [id number]

## 2022-02-25 ENCOUNTER — APPOINTMENT (OUTPATIENT)
Dept: CT IMAGING | Age: 52
DRG: 871 | End: 2022-02-25
Payer: COMMERCIAL

## 2022-02-25 LAB
A/G RATIO: 1.2 (ref 1.1–2.2)
ALBUMIN SERPL-MCNC: 3.4 G/DL (ref 3.4–5)
ALP BLD-CCNC: 103 U/L (ref 40–129)
ALT SERPL-CCNC: 9 U/L (ref 10–40)
ANION GAP SERPL CALCULATED.3IONS-SCNC: 21 MMOL/L (ref 3–16)
AST SERPL-CCNC: 10 U/L (ref 15–37)
BASE EXCESS VENOUS: -6.1 MMOL/L (ref -3–3)
BASOPHILS ABSOLUTE: 0.1 K/UL (ref 0–0.2)
BASOPHILS RELATIVE PERCENT: 0.4 %
BILIRUB SERPL-MCNC: <0.2 MG/DL (ref 0–1)
BLOOD CULTURE, ROUTINE: NORMAL
BUN BLDV-MCNC: 74 MG/DL (ref 7–20)
CALCIUM SERPL-MCNC: 8.8 MG/DL (ref 8.3–10.6)
CARBOXYHEMOGLOBIN: 1.9 % (ref 0–1.5)
CHLORIDE BLD-SCNC: 93 MMOL/L (ref 99–110)
CO2: 20 MMOL/L (ref 21–32)
CREAT SERPL-MCNC: 9.5 MG/DL (ref 0.9–1.3)
CULTURE, BLOOD 2: NORMAL
EOSINOPHILS ABSOLUTE: 0.2 K/UL (ref 0–0.6)
EOSINOPHILS RELATIVE PERCENT: 1.6 %
GFR AFRICAN AMERICAN: 7
GFR NON-AFRICAN AMERICAN: 6
GLUCOSE BLD-MCNC: 108 MG/DL (ref 70–99)
GLUCOSE BLD-MCNC: 123 MG/DL (ref 70–99)
GLUCOSE BLD-MCNC: 58 MG/DL (ref 70–99)
GLUCOSE BLD-MCNC: 66 MG/DL (ref 70–99)
GLUCOSE BLD-MCNC: 72 MG/DL (ref 70–99)
GLUCOSE BLD-MCNC: 78 MG/DL (ref 70–99)
GLUCOSE BLD-MCNC: 81 MG/DL (ref 70–99)
GLUCOSE BLD-MCNC: 90 MG/DL (ref 70–99)
HCO3 VENOUS: 20.8 MMOL/L (ref 23–29)
HCT VFR BLD CALC: 24 % (ref 40.5–52.5)
HEMOGLOBIN: 7.8 G/DL (ref 13.5–17.5)
LACTIC ACID, SEPSIS: 0.7 MMOL/L (ref 0.4–1.9)
LACTIC ACID, SEPSIS: 0.7 MMOL/L (ref 0.4–1.9)
LYMPHOCYTES ABSOLUTE: 0.8 K/UL (ref 1–5.1)
LYMPHOCYTES RELATIVE PERCENT: 5.7 %
MCH RBC QN AUTO: 29.5 PG (ref 26–34)
MCHC RBC AUTO-ENTMCNC: 32.6 G/DL (ref 31–36)
MCV RBC AUTO: 90.5 FL (ref 80–100)
METHEMOGLOBIN VENOUS: 0.5 %
MONOCYTES ABSOLUTE: 1 K/UL (ref 0–1.3)
MONOCYTES RELATIVE PERCENT: 7.3 %
NEUTROPHILS ABSOLUTE: 12.1 K/UL (ref 1.7–7.7)
NEUTROPHILS RELATIVE PERCENT: 85 %
O2 SAT, VEN: 87 %
O2 THERAPY: ABNORMAL
PCO2, VEN: 48.1 MMHG (ref 40–50)
PDW BLD-RTO: 16.2 % (ref 12.4–15.4)
PERFORMED ON: ABNORMAL
PERFORMED ON: NORMAL
PH VENOUS: 7.25 (ref 7.35–7.45)
PLATELET # BLD: 353 K/UL (ref 135–450)
PMV BLD AUTO: 8.1 FL (ref 5–10.5)
PO2, VEN: 59.7 MMHG (ref 25–40)
POTASSIUM REFLEX MAGNESIUM: 4.9 MMOL/L (ref 3.5–5.1)
RBC # BLD: 2.65 M/UL (ref 4.2–5.9)
SARS-COV-2, NAAT: NOT DETECTED
SODIUM BLD-SCNC: 134 MMOL/L (ref 136–145)
TCO2 CALC VENOUS: 22 MMOL/L
TOTAL PROTEIN: 6.2 G/DL (ref 6.4–8.2)
TSH REFLEX: 2.02 UIU/ML (ref 0.27–4.2)
VANCOMYCIN RANDOM: 19 UG/ML
WBC # BLD: 14.2 K/UL (ref 4–11)

## 2022-02-25 PROCEDURE — 2580000003 HC RX 258: Performed by: SURGERY

## 2022-02-25 PROCEDURE — 2700000000 HC OXYGEN THERAPY PER DAY

## 2022-02-25 PROCEDURE — 94761 N-INVAS EAR/PLS OXIMETRY MLT: CPT

## 2022-02-25 PROCEDURE — 99223 1ST HOSP IP/OBS HIGH 75: CPT | Performed by: INTERNAL MEDICINE

## 2022-02-25 PROCEDURE — 87635 SARS-COV-2 COVID-19 AMP PRB: CPT

## 2022-02-25 PROCEDURE — 2580000003 HC RX 258: Performed by: STUDENT IN AN ORGANIZED HEALTH CARE EDUCATION/TRAINING PROGRAM

## 2022-02-25 PROCEDURE — 6370000000 HC RX 637 (ALT 250 FOR IP): Performed by: SURGERY

## 2022-02-25 PROCEDURE — 85025 COMPLETE CBC W/AUTO DIFF WBC: CPT

## 2022-02-25 PROCEDURE — 94640 AIRWAY INHALATION TREATMENT: CPT

## 2022-02-25 PROCEDURE — 1200000000 HC SEMI PRIVATE

## 2022-02-25 PROCEDURE — 80202 ASSAY OF VANCOMYCIN: CPT

## 2022-02-25 PROCEDURE — 90935 HEMODIALYSIS ONE EVALUATION: CPT

## 2022-02-25 PROCEDURE — 6360000002 HC RX W HCPCS: Performed by: SURGERY

## 2022-02-25 PROCEDURE — 94660 CPAP INITIATION&MGMT: CPT

## 2022-02-25 PROCEDURE — 87040 BLOOD CULTURE FOR BACTERIA: CPT

## 2022-02-25 PROCEDURE — 6360000002 HC RX W HCPCS: Performed by: NURSE PRACTITIONER

## 2022-02-25 PROCEDURE — 70450 CT HEAD/BRAIN W/O DYE: CPT

## 2022-02-25 PROCEDURE — 6360000002 HC RX W HCPCS: Performed by: STUDENT IN AN ORGANIZED HEALTH CARE EDUCATION/TRAINING PROGRAM

## 2022-02-25 PROCEDURE — 82803 BLOOD GASES ANY COMBINATION: CPT

## 2022-02-25 PROCEDURE — 84443 ASSAY THYROID STIM HORMONE: CPT

## 2022-02-25 PROCEDURE — 36415 COLL VENOUS BLD VENIPUNCTURE: CPT

## 2022-02-25 PROCEDURE — 6360000002 HC RX W HCPCS

## 2022-02-25 PROCEDURE — 83605 ASSAY OF LACTIC ACID: CPT

## 2022-02-25 PROCEDURE — 6360000002 HC RX W HCPCS: Performed by: INTERNAL MEDICINE

## 2022-02-25 PROCEDURE — 99024 POSTOP FOLLOW-UP VISIT: CPT | Performed by: SURGERY

## 2022-02-25 PROCEDURE — 80053 COMPREHEN METABOLIC PANEL: CPT

## 2022-02-25 RX ORDER — ALBUTEROL SULFATE 2.5 MG/3ML
2.5 SOLUTION RESPIRATORY (INHALATION) EVERY 4 HOURS PRN
Status: DISCONTINUED | OUTPATIENT
Start: 2022-02-25 | End: 2022-03-03 | Stop reason: HOSPADM

## 2022-02-25 RX ORDER — HALOPERIDOL 5 MG/ML
5 INJECTION INTRAMUSCULAR EVERY 6 HOURS PRN
Status: DISCONTINUED | OUTPATIENT
Start: 2022-02-25 | End: 2022-02-25

## 2022-02-25 RX ORDER — HALOPERIDOL 5 MG/ML
5 INJECTION INTRAMUSCULAR EVERY 6 HOURS PRN
Status: DISCONTINUED | OUTPATIENT
Start: 2022-02-25 | End: 2022-03-03 | Stop reason: HOSPADM

## 2022-02-25 RX ADMIN — DEXTROSE MONOHYDRATE 125 ML: 100 INJECTION, SOLUTION INTRAVENOUS at 12:19

## 2022-02-25 RX ADMIN — SODIUM CHLORIDE, PRESERVATIVE FREE 10 ML: 5 INJECTION INTRAVENOUS at 21:56

## 2022-02-25 RX ADMIN — HEPARIN SODIUM 5000 UNITS: 5000 INJECTION INTRAVENOUS; SUBCUTANEOUS at 16:04

## 2022-02-25 RX ADMIN — TRAZODONE HYDROCHLORIDE 150 MG: 50 TABLET ORAL at 21:46

## 2022-02-25 RX ADMIN — QUETIAPINE FUMARATE 50 MG: 25 TABLET ORAL at 21:46

## 2022-02-25 RX ADMIN — HEPARIN SODIUM 5000 UNITS: 5000 INJECTION INTRAVENOUS; SUBCUTANEOUS at 21:46

## 2022-02-25 RX ADMIN — ACETAMINOPHEN 650 MG: 325 TABLET ORAL at 21:46

## 2022-02-25 RX ADMIN — TIOTROPIUM BROMIDE AND OLODATEROL 2 PUFF: 3.124; 2.736 SPRAY, METERED RESPIRATORY (INHALATION) at 08:17

## 2022-02-25 RX ADMIN — ACETAMINOPHEN 650 MG: 325 TABLET ORAL at 16:08

## 2022-02-25 RX ADMIN — EPOETIN ALFA-EPBX 10000 UNITS: 10000 INJECTION, SOLUTION INTRAVENOUS; SUBCUTANEOUS at 14:52

## 2022-02-25 RX ADMIN — MEROPENEM 500 MG: 500 INJECTION, POWDER, FOR SOLUTION INTRAVENOUS at 09:49

## 2022-02-25 RX ADMIN — OXYCODONE 5 MG: 5 TABLET ORAL at 06:09

## 2022-02-25 RX ADMIN — SODIUM CHLORIDE, PRESERVATIVE FREE 10 ML: 5 INJECTION INTRAVENOUS at 12:25

## 2022-02-25 RX ADMIN — METRONIDAZOLE 500 MG: 250 TABLET ORAL at 06:09

## 2022-02-25 RX ADMIN — HALOPERIDOL LACTATE 5 MG: 5 INJECTION, SOLUTION INTRAMUSCULAR at 01:21

## 2022-02-25 RX ADMIN — HEPARIN SODIUM 5000 UNITS: 5000 INJECTION INTRAVENOUS; SUBCUTANEOUS at 06:09

## 2022-02-25 RX ADMIN — ALTEPLASE 4 MG: 2.2 INJECTION, POWDER, LYOPHILIZED, FOR SOLUTION INTRAVENOUS at 14:58

## 2022-02-25 RX ADMIN — ACETAMINOPHEN 650 MG: 325 TABLET ORAL at 06:09

## 2022-02-25 ASSESSMENT — PAIN SCALES - GENERAL
PAINLEVEL_OUTOF10: 5
PAINLEVEL_OUTOF10: 5
PAINLEVEL_OUTOF10: 2
PAINLEVEL_OUTOF10: 5
PAINLEVEL_OUTOF10: 2

## 2022-02-25 ASSESSMENT — PAIN DESCRIPTION - LOCATION: LOCATION: BACK

## 2022-02-25 NOTE — ANESTHESIA POSTPROCEDURE EVALUATION
Department of Anesthesiology  Postprocedure Note    Patient: Renetta Sibley  MRN: [id number]  YOB: 1970  Date of evaluation: 2/24/2022    Procedure Summary     Date: 02/24/22 Room / Location: [facility] [place] OR [address] [place] [address]    Anesthesia Start: 4159 Anesthesia Stop: 8435    Procedure: PERINEAL ABCESS DRAINAGE (N/A Perineum) Diagnosis: (PERINEAL ABCESS DRAINAGE)    Surgeons: Rocio Nolan MD Responsible Provider: Jazmyn Mendoza MD    Anesthesia Type: general ASA Status: 4 - Emergent        Anesthesia Type: general    Ernesto Phase I: Ernesto Score: 9    Ernesto Phase II:      Last vitals: Reviewed and per EMR flowsheets.      Anesthesia Post Evaluation   Anesthetic Problems: no   Cardiovascular System Stable: yes  Respiratory Function: Airway Patent yes  ETT no  Ventilator no  Level of consciousness: awake, alert and oriented  Post-op pain: adequate analgesia  Hydration Adequate: yes  Nausea/Vomiting:no  Other Issues:     Erik Mcdowell MD

## 2022-02-25 NOTE — PROGRESS NOTES
02/25/22 0334   NIV Type   $NIV $Daily Charge   NIV Started/Stopped On   Equipment Type V60   Mode Bilevel   Mask Type Full face mask   Mask Size Medium   Settings/Measurements   IPAP 16 cmH20   CPAP/EPAP 10 cmH2O   Rate Ordered 10   Resp 16   FiO2  30 %   I Time/ I Time % 1 s   Vt Exhaled 520 mL   Minute Volume 9 Liters   Mask Leak (lpm) 42 lpm   Comfort Level Good   Using Accessory Muscles No   SpO2 96   Alarm Settings   Alarms On Y

## 2022-02-25 NOTE — PROGRESS NOTES
Hospitalist Progress Note      PCP: Noelle Christy MD    Date of Admission: 2/21/2022    Chief Complaint: perianal pain and dyspnea    Hospital Course: In brief this is a 46 yoM with ESRD and TIIDM presenting with perianal pain concerning for cellulitis and dyspnea likely from volume overload though alternative etiologies being explored. Hospital course has been complicated by worsening encephalopathy concerning for hypoactive delirium however exploring other more sinister causes. Subjective: pt more drowsy today. When prompted multiple times he is able to state why he is here and where he is and answer questions appropriately but states he feels very drowsy.       Medications:  Reviewed    Infusion Medications    dextrose      sodium chloride       Scheduled Medications    epoetin siddharth-epbx  10,000 Units IntraVENous Q MWF    cefepime  1,000 mg IntraVENous Q24H    metroNIDAZOLE  500 mg Oral 3 times per day    acetaminophen  650 mg Oral 3 times per day    tiotropium-olodaterol  2 puff Inhalation Daily    aspirin  81 mg Oral Daily    calcium acetate  667 mg Oral TID WC    clopidogrel  75 mg Oral Daily    DULoxetine  60 mg Oral Daily    gabapentin  100 mg Oral TID    insulin glargine  30 Units SubCUTAneous Nightly    isosorbide mononitrate  30 mg Oral Daily    lisinopril  10 mg Oral Daily    metoprolol succinate  50 mg Oral Daily    QUEtiapine  50 mg Oral Nightly    torsemide  100 mg Oral Daily    traZODone  150 mg Oral Nightly    linaclotide  145 mcg Oral QAM AC    pravastatin  40 mg Oral Daily    insulin lispro  0-18 Units SubCUTAneous TID WC    insulin lispro  0-9 Units SubCUTAneous Nightly    sodium chloride flush  5-40 mL IntraVENous 2 times per day    heparin (porcine)  5,000 Units SubCUTAneous 3 times per day    vancomycin (VANCOCIN) intermittent dosing (placeholder)   Other RX Placeholder     PRN Meds: haloperidol lactate, heparin (porcine), prochlorperazine, oxyCODONE, fentanNYL, oxyCODONE, albuterol sulfate HFA, cyclobenzaprine, ipratropium-albuterol, glucose, glucagon (rDNA), dextrose, sodium chloride flush, sodium chloride, dextrose bolus (hypoglycemia) **OR** dextrose bolus (hypoglycemia)      Intake/Output Summary (Last 24 hours) at 2/25/2022 0910  Last data filed at 2/25/2022 0450  Gross per 24 hour   Intake 0 ml   Output 5 ml   Net -5 ml       Physical Exam Performed:    /70   Pulse 86   Temp 100.6 °F (38.1 °C) (Axillary)   Resp 24   Ht 5' 9\" (1.753 m)   Wt 263 lb 7.2 oz (119.5 kg)   SpO2 96%   BMI 38.90 kg/m²     General appearance: drowsy during exam laying in bed following commands intermittently. HEENT: Pupils equal, round, and reactive to light. Conjunctivae/corneas clear. Neck: Supple, with full range of motion. No rigiditiy  Respiratory:  Normal respiratory effort. Clear to auscultation, bilaterally without Rales/Wheezes/Rhonchi. On NC, coarse upper airway sounds  Cardiovascular: Regular rate and rhythm with normal S1/S2 without murmurs, rubs or gallops. Abdomen: Soft, non-tender, non-distended with normal bowel sounds. Musculoskeletal: No clubbing, cyanosis or edema bilaterally. Full range of motion without deformity. Neurologic:  Drowsy but arouses and exam is non focal, he has asterixes on exam with faint facial fasciculations, no cranial nerve deficits appreciated, -4/5 strength in UE bilaterally, 5/5 strength in LE bilaterally.   Capillary Refill: Brisk,3 seconds, normal   Peripheral Pulses: +2 palpable, equal bilaterally       Labs:   Recent Labs     02/23/22  0800 02/24/22  0311 02/25/22  0624   WBC 16.0* 13.0* 14.2*   HGB 7.6* 7.9* 7.8*   HCT 23.5* 24.1* 24.0*    299 353     Recent Labs     02/23/22  0800 02/24/22  0311 02/25/22  0624   * 130* 134*   K 5.9* 4.9 4.9   CL 92* 90* 93*   CO2 18* 23 20*   BUN 99* 57* 74*   CREATININE 11.3* 7.5* 9.5*   CALCIUM 8.4 8.7 8.8     Recent Labs     02/23/22  0800 02/24/22  1822 02/25/22  0624   AST 12* 10* 10*   ALT 8* 8* 9*   BILITOT <0.2 <0.2 <0.2   ALKPHOS 125 105 103     No results for input(s): INR in the last 72 hours. Recent Labs     02/22/22  1628   TROPONINI 0.18*       Urinalysis:      Lab Results   Component Value Date    NITRU Negative 02/22/2022    WBCUA 3-5 02/22/2022    BACTERIA Rare 02/22/2022    RBCUA 0-2 02/22/2022    BLOODU TRACE-INTACT 02/22/2022    SPECGRAV 1.015 02/22/2022    GLUCOSEU 250 02/22/2022       Radiology:  CT CHEST PULMONARY EMBOLISM W CONTRAST   Final Result   There is a suboptimal contrast bolus, limiting evaluation of the segmental   and subsegmental branches. No central pulmonary embolism is detected. Patchy airspace disease in the lower lungs bilaterally, greater on the left,   most compatible with multifocal pneumonia or aspiration. Small left pleural   effusion has developed. Mildly enlarged mediastinal lymph nodes, similar compared to the previous   exam.  These are most likely reactive, but a follow-up chest CT in   approximately 3 months is recommended to ensure resolution. The small nodule seen previously within the left lower lobe is obscured on   the current examination. Please refer to the previous exam for   recommendations. XR CHEST PORTABLE   Final Result   No acute process. CT PELVIS WO CONTRAST Additional Contrast? None   Final Result   1. Small 14 mm ovoid fluid collection in the subcutaneous fat of the right   perineum unchanged from at least 01/01/2021. Minimal adjacent subcutaneous   fat stranding. This may represent a sebaceous cyst with superimposed   infection not excluded. 2. No soft tissue gas or other drainable fluid collection. 3. No acute osseous abnormality. XR CHEST PORTABLE   Final Result   Vascular congestion. No consolidation. Stable cardiomegaly.                  Assessment/Plan:    Active Hospital Problems    Diagnosis     Acute on chronic combined systolic and diastolic heart failure (HCC) [I50.43]      Priority: High    Dyslipidemia [E78.5]      Priority: High    Type 2 diabetes, uncontrolled, with neuropathy ([facility] [address] [address] [E11.40, E11.65]      Priority: High    Essential hypertension [I10]      Priority: Medium    SIRS (systemic inflammatory response syndrome) ([facility]) [R65.10]     ESRD (end stage renal disease) ([facility]) [N18.6]     Obesity (BMI 30-39. 9) [E66.9]     Sepsis without acute organ dysfunction ([facility] [address] [A41.9]      Encephalopathy ddx broad perhaps hypoactive delirium but need to rule out other causes. Hypoglycemic this morning which can play a role but mental status unchanged when corrected. No profound or acutely worsening uremia or hyperammoniemia level to attribute this too. VBG has been fine in the past and pt worse BIPAP overnight per nursing. Will check VBG again due to acute worsening. No clinical signs of CNS infection. Perhaps if sepsis is brewing then his mental status can be caused from this however lactate reassuring. Repeat bld cx obtained and changing abcx. No focal neuro deficits but does have increased UE weakness will get CT non con to rule out vascular process may need MRI brain. Perhaps cephalosporin toxicity and thus will change cefepime to aminta. No oxy given today. - f/u bld cxs, VBG, CT head non con  - switch abcx  - if no change may need MRI and neuro  - hold gabapentin    Perianal cellulitis w/ sepsis (SIRS criteira WBC 13.1, )  - CT pelvis revealed: small 14 mm ovoid fluid collection in subcutaneous fat of the right perineum unchanged from atleast 01/01/2021. Minimal adjacent subcutaneous fat stranding. This may represent a sebaceous cyst with superimposed infection. No soft tissue gas or other drainable fluid collection.   -will tailor abcx to surgical cultures but for now transition to meropenem and vancomycin to reduce CNS toxicity  - pain regimen adjusted: acetaminophen scheduled, oxycodone PO for breakthrough pain and IV opioids if PO insufficient. New  Acute hypoxic respiratory failure- likely volume overload from inadequate iHD based on initial CXR but sxs seem to overnight developed hypoxia (88%) on 2L NC. Initially presentation was dyspnea with out hypoxia. Suspect pt may have now aspirated. Ddimer acutely elevated > 2000 but CTA without PE. Covid rapid negative. - continue iHD with nephrology assistance. - overnight pulse ox study ordered and CPAP  - wean oxygen as able    TIIDM- a1c 9.4 on 1/12/2022. Hypoglycemia noted. Holding basal and continuing SSI only today    Afib/aflutter- not on anticoagulation for unclear reasons. Perhaps iHD, non compliance and morbid obesity are complicating factors? Pt unclear as to why. I do not see it documented in cardiology notes. CHADSVASC 6.  - consult cardiology 02/25    Essential HTN  -continue metoprolol and lisinopril     HLD  -continue pravastatin    Elevated troponin, 0.15-->0.17 on admission  -no c/o chest pain  -likely 2/2 demand ischemia r/t ESRD  -trend troponin flat  -tele monitoring    Acute on chronic CHF, stable  -strict I&O  -daily weights (dry wt 119)  -continue demadex     Chronic normocytic anemia likely from anemia of chronic disease  -no s/s of bleeding at this time     Anxiety depression  -continue home meds  -mood stable at this time    ESRD iHD MWF  - nephrology following    Obesity  With Body mass index is 94.3 kg/m². Complicating assessment and treatment. Placing patient at risk for multiple co-morbidities as well as early death and contributing to the patient's presentation. Counseled on weight loss     DVT Prophylaxis: heparin ppx  Diet: ADULT DIET; Regular; 4 carb choices (60 gm/meal);  Low Sodium (2 gm) can advance diet post procedure  Code Status: Full Code    PT/OT Eval Status: not ordered    Family updated 02/24    Dispo - pending clinical course    Bertram Gunter MD

## 2022-02-25 NOTE — CARE COORDINATION
Care being managed by [address] [place] [address], Cardiology. LOS 4. OR yesterday for I&D perianal cyst. From home  w spouse- HD at [facility] MWF. Following for poss needs for wound care, 02, CPAP. Tentative referral has been placed to [address].  If [facility] needed- [facility] cannot take due to compliance issues in the past. Saadia Marin RN

## 2022-02-25 NOTE — PROGRESS NOTES
[facility] GENERAL SURGERY    Surgery Progress Note           POD # 1    PATIENT NAME: Cecilia Dunn     TODAY'S DATE: 2/25/2022    INTERVAL HISTORY:    Pt with some confusion and pain. OBJECTIVE:   VITALS:  BP (!) 149/69   Pulse 91   Temp 97.7 °F (36.5 °C) (Axillary)   Resp 22   Ht 5' 9\" (1.753 m)   Wt 263 lb 7.2 oz (119.5 kg)   SpO2 96%   BMI 38.90 kg/m²     INTAKE/OUTPUT:    I/O last 3 completed shifts: In: 480 [P.O.:480]  Out: 5 [Blood:5]  No intake/output data recorded. Incision -  No erythema or purulence, no bleeding    Data:  CBC: Recent Labs     02/23/22  0800 02/24/22  0311 02/25/22  0624   WBC 16.0* 13.0* 14.2*   HGB 7.6* 7.9* 7.8*   HCT 23.5* 24.1* 24.0*    299 353     BMP:    Recent Labs     02/23/22  0800 02/24/22  0311 02/25/22  0624   * 130* 134*   K 5.9* 4.9 4.9   CL 92* 90* 93*   CO2 18* 23 20*   BUN 99* 57* 74*   CREATININE 11.3* 7.5* 9.5*   GLUCOSE 78 133* 58*     Hepatic:   Recent Labs     02/23/22  0800 02/24/22  0311 02/25/22  0624   AST 12* 10* 10*   ALT 8* 8* 9*   BILITOT <0.2 <0.2 <0.2   ALKPHOS 125 105 103     Mag:    No results for input(s): MG in the last 72 hours. Phos:   No results for input(s): PHOS in the last 72 hours. INR: No results for input(s): INR in the last 72 hours. Radiology Review:  NA    ASSESSMENT AND PLAN:  46 y.o. male status post I&D of perineal infected cyst  1. Gauze dressing for now  2. Leave packing out  3.   Finish course of abx         Electronically signed by Linda Rehman MD

## 2022-02-25 NOTE — CONSULTS
[address] [facility]  [phone]      Attending Physician: Fiona Pino MD  Reason for Consultation/Chief Complaint: afib    Subjective   History of Present Illness:  Serg Snyder is a 46 y.o. patient who presented to the hospital with complaints of shortness of breath and buttock pain. Patient was found to have a sebaceous 6 in his back that was thought to be causing an infection. Apparently also complained about some chest pain to the admitting provider and he was at dialysis and unable to tolerate his session. Unfortunately patient is currently with altered mental status. He is not able to give any useful history. He believes he is on a ship and otherwise is very confused    Past Medical History:   has a past medical history of Ambulatory dysfunction, Aortic stenosis, Arthritis, Asthma, Bilateral hilar adenopathy syndrome, CAD (coronary artery disease), Cardiomyopathy ([address] CHF (congestive heart failure) ([address] Chronic pain, COPD (chronic obstructive pulmonary disease) ([address] Depression, Diabetes mellitus ([address] Difficult intravenous access, Emphysema of lung ([address] ESRD (end stage renal disease) on dialysis ([address] Fear of needles, Gastric ulcer, GERD (gastroesophageal reflux disease), Heart valve problem, Hemodialysis patient ([address] History of spinal fracture, Hx of blood clots, Hyperlipidemia, Hypertension, MI (myocardial infarction) ([address] Neuromuscular disorder ([address] Numbness and tingling in left arm, Pneumonia, PONV (postoperative nausea and vomiting), Prolonged emergence from general anesthesia, Sleep apnea, Stroke ([address] TIA (transient ischemic attack), and Unspecified diseases of blood and blood-forming organs. Surgical History:   has a past surgical history that includes Tonsillectomy; cyst removal (08/14/2013); Colonoscopy; Coronary angioplasty with stent (05/26/15); vascular surgery (aprx 2 years ago); Colonoscopy (2/29/2015);  Upper gastrointestinal endoscopy (01/06/2016); Upper gastrointestinal endoscopy (01/29/2017); other surgical history (02/01/2017); Dialysis fistula creation (Left, 10/30/2017); Diagnostic Cardiac Cath Lab Procedure; other surgical history (2018); other surgical history (Bilateral, 06/26/2018); pr lap insertion tunneled intraperitoneal catheter (N/A, 9/21/2018); other surgical history (Right, 09/2018); Dialysis fistula creation (Left, 3/27/2019); other surgical history (05/28/2019); Endoscopy, colon, diagnostic; Catheter Removal (Right, 7/2/2019); and Aortic valve replacement (N/A, 10/15/2019). Social History:   reports that he quit smoking about 22 months ago. His smoking use included cigarettes. He has a 16.50 pack-year smoking history. He has never used smokeless tobacco. He reports previous alcohol use. He reports that he does not use drugs. Family History:  family history includes Alcohol Abuse in his brother; Cancer in his sister and sister; Diabetes in his mother; Heart Disease in his father, sister, and sister; Kidney Disease in his sister; Obesity in his sister and sister. Home Medications:  Were reviewed and are listed in nursing record and/or below  Prior to Admission medications    Medication Sig Start Date End Date Taking?  Authorizing Provider   pravastatin (PRAVACHOL) 40 MG tablet Take 1 tablet by mouth daily 2/10/22  Yes JAY Steven CNP   metoprolol succinate (TOPROL XL) 50 MG extended release tablet Take 1 tablet by mouth daily 2/10/22  Yes JAY Steven CNP   lisinopril (PRINIVIL;ZESTRIL) 10 MG tablet Take 1 tablet by mouth daily 2/10/22  Yes JAY Steven CNP   clopidogrel (PLAVIX) 75 MG tablet Take 1 tablet by mouth daily 2/10/22  Yes JAY Steven CNP   isosorbide mononitrate (IMDUR) 30 MG extended release tablet Take 1 tablet by mouth daily 2/10/22  Yes JAY Steven CNP   torsemide (DEMADEX) 100 MG tablet Take 1 tablet by mouth daily 2/10/22  Yes Colleen JJ Aydee Rendon, APRN - CNP   oxyCODONE-acetaminophen (PERCOCET) 7.5-325 MG per tablet Take 1 tablet by mouth every 6 hours as needed (pain) for up to 30 days. 2/7/22 3/9/22 Yes Renuka Wooten MD   QUEtiapine (SEROQUEL) 50 MG tablet TAKE 1 TABLET BY MOUTH IN THE EVENING 1/25/22  Yes Renuka Wooten MD   cyclobenzaprine (FLEXERIL) 10 MG tablet TAKE 1 TABLET BY MOUTH EVERY 8 HOURS AS NEEDED 1/24/22  Yes Renuka Wooten MD   aspirin 81 MG chewable tablet Take 1 tablet by mouth daily 1/18/22  Yes James Cheng MD   insulin glargine [facility]) 100 UNIT/ML injection pen Inject 30 Units into the skin nightly 1/17/22  Yes James Cheng MD    MG capsule TAKE 1 CAPSULE BY MOUTH TWICE DAILY 11/12/21  Yes Erasmo López APRN - CNP   DULoxetine (CYMBALTA) 60 MG extended release capsule TAKE 1 CAPSULE BY MOUTH EVERY DAY 10/5/21  Yes Renuka Wooten MD   traZODone (DESYREL) 150 MG tablet TAKE ONE (1) TABLET BY MOUTH NIGHTLY 9/29/21  Yes Renuka Wooten MD   B Complex-C-Folic Acid (VIRT-CAPS) 1 MG CAPS TAKE 1 CAPSULE BY MOUTH EVERY DAY 9/20/21  Yes Renuka Wooten MD   Calcium Acetate, Phos Binder, 667 MG CAPS TAKE 1 CAPSULE BY MOUTH THREE TIMES DAILY WITH MEALS 8/12/21  Yes Renuka Wooten MD   LINZESS 145 MCG capsule TAKE 1 CAPSULE BY MOUTH EVERY MORNING BEFORE BREAKFAST 5/25/21  Yes Renuka Wooten MD   nitroGLYCERIN (NITROSTAT) 0.4 MG SL tablet DISSOLVE 1 TABLET UNDER THE TONGUE AS NEEDED FOR CHEST PAIN EVERY 5 MINUTES UP TO 3 TIMES.  IF NO RELIEF CALL 911. 1/7/21  Yes Renuka Wooten MD   insulin aspart (NOVOLOG FLEXPEN) 100 UNIT/ML injection pen Inject 20 Units into the skin 3 times daily (before meals) 10/12/20  Yes Renuka Wooten MD   vitamin D (ERGOCALCIFEROL) 08748 units CAPS capsule TK 1 C PO WEEKLY 6/2/19  Yes Gerson Velazquez MD   Tiotropium Bromide-Olodaterol (STIOLTO RESPIMAT) 2.5-2.5 MCG/ACT AERS Inhale 2 puffs into the lungs daily 5/21/19  Yes Manuel Cruz MD   Polyethylene Glycol 3350 GRAN 5/2/18  Yes Historical Provider, MD   albuterol sulfate  (90 Base) MCG/ACT inhaler Inhale 2 puffs into the lungs every 6 hours as needed for Wheezing 11/8/17  Yes Kirit Martinez MD   ipratropium-albuterol (DUONEB) 0.5-2.5 (3) MG/3ML SOLN nebulizer solution Inhale 3 mLs into the lungs every 6 hours as needed for Shortness of Breath 10/15/17  Yes Cuca Gong MD   calcium carbonate (TUMS) 500 MG chewable tablet Take 1 tablet by mouth 3 times daily as needed for Heartburn.    Yes Historical Provider, MD   pantoprazole (PROTONIX) 40 MG tablet TAKE 1 TABLET BY MOUTH EVERY MORNING BEFORE BREAKFAST 2/22/22   JAY Lopez - CNP   fluticasone (FLONASE) 50 MCG/ACT nasal spray SHAKE LIQUID AND USE 2 SPRAYS IN [facility] NOSTRIL DAILY 1/19/22   Alba Patel MD   gabapentin (NEURONTIN) 100 MG capsule TAKE 1-2 CAPSULES BY MOUTH THREE TIMES A DAY 11/29/21 2/10/22  Alba Patel MD   Continuous Blood Gluc Sensor (DEXCOM G6 SENSOR) MISC Every 10 days 10/5/21   Alba Patel MD   Continuous Blood Gluc Transmit (DEXCOM G6 TRANSMITTER) MISC 1 each by Does not apply route every 3 months 10/5/21   Alba Patel MD   Continuous Blood Gluc  ([address]) [address] 1 each by Does not apply route Daily with lunch 10/5/21   Alba Patel MD   Blood Glucose Monitoring Suppl ADAM USE AS DIRECTED. 4/25/18   Inocente Porras MD   Alcohol Swabs PADS USE AS DIRECTED 4/25/18   Inocente Porras MD        CURRENT Medications:  haloperidol lactate (HALDOL) injection 5 mg, Q6H PRN  meropenem (MERREM) 500 mg IVPB (mini-bag), Q24H  vancomycin (VANCOCIN) 750 mg in dextrose 5 % 250 mL IVPB, Once  albuterol (PROVENTIL) nebulizer solution 2.5 mg, Q4H PRN  heparin (porcine) injection 4,500 Units, PRN  epoetin siddharth-epbx (RETACRIT) injection 10,000 Units, Q MWF  prochlorperazine (COMPAZINE) injection 10 mg, Q6H PRN  acetaminophen (TYLENOL) tablet 650 mg, 3 times per day  oxyCODONE (ROXICODONE) immediate release tablet 10 mg, Q6H PRN  fentaNYL (SUBLIMAZE) injection 25 mcg, Q2H PRN  tiotropium-olodaterol (STIOLTO) 2.5-2.5 MCG/ACT inhaler 2 puff, Daily  oxyCODONE (ROXICODONE) immediate release tablet 5 mg, Q6H PRN  albuterol sulfate  (90 Base) MCG/ACT inhaler 2 puff, Q6H PRN  aspirin chewable tablet 81 mg, Daily  calcium acetate (PHOSLO) capsule 667 mg, TID WC  clopidogrel (PLAVIX) tablet 75 mg, Daily  cyclobenzaprine (FLEXERIL) tablet 5 mg, TID PRN  DULoxetine (CYMBALTA) extended release capsule 60 mg, Daily  [Held by provider] gabapentin (NEURONTIN) capsule 100 mg, TID  [Held by provider] isosorbide mononitrate (IMDUR) extended release tablet 30 mg, Daily  [Held by provider] lisinopril (PRINIVIL;ZESTRIL) tablet 10 mg, Daily  metoprolol succinate (TOPROL XL) extended release tablet 50 mg, Daily  QUEtiapine (SEROQUEL) tablet 50 mg, Nightly  torsemide (DEMADEX) tablet 100 mg, Daily  traZODone (DESYREL) tablet 150 mg, Nightly  linaclotide (LINZESS) capsule 145 mcg, QAM AC  pravastatin (PRAVACHOL) tablet 40 mg, Daily  insulin lispro (HUMALOG) injection vial 0-18 Units, TID WC  insulin lispro (HUMALOG) injection vial 0-9 Units, Nightly  glucose (GLUTOSE) 40 % oral gel 15 g, PRN  glucagon (rDNA) injection 1 mg, PRN  dextrose 5 % solution, PRN  sodium chloride flush 0.9 % injection 5-40 mL, 2 times per day  sodium chloride flush 0.9 % injection 5-40 mL, PRN  0.9 % sodium chloride infusion, PRN  heparin (porcine) injection 5,000 Units, 3 times per day  dextrose bolus (hypoglycemia) 10% 125 mL, PRN   Or  dextrose bolus (hypoglycemia) 10% 250 mL, PRN  vancomycin (VANCOCIN) intermittent dosing (placeholder), RX Placeholder        Allergies:  Morphine     Review of Systems:   A 14 point review of symptoms completed. Pertinent positives identified in the HPI, all other review of symptoms negative as below.       Objective   PHYSICAL EXAM:    Vitals:    02/25/22 0810   BP: 100/70   Pulse: 86   Resp: 24   Temp:    SpO2: 96%    Weight: 263 lb 7.2 oz (119.5 kg)         General Appearance:  Alert, delirious, no distress, appears stated age, obese   Head:  Normocephalic, without obvious abnormality, atraumatic   Eyes:  PERRL, conjunctiva/corneas clear   Nose: Nares normal, no drainage or sinus tenderness   Throat: Lips, mucosa, and tongue normal   Neck: Supple, symmetrical, trachea midline, no adenopathy, thyroid: not enlarged, symmetric, no tenderness/mass/nodules, no carotid bruit or JVD   Lungs:   Clear to auscultation bilaterally, respirations unlabored   Chest Wall:  No deformity or tenderness   Heart:  Regular rate and rhythm, S1, S2 normal, no murmur, rub or gallop   Abdomen:   Soft, non-tender, bowel sounds active all four quadrants,  no masses, no organomegaly   Extremities: Extremities normal, atraumatic, no cyanosis or edema   Pulses: 2+ and symmetric   Skin: Skin color, texture, turgor normal, no rashes or lesions   Pysch:  Pleasant and calm but confused   Neurologic: Normal gross motor and sensory exam.         Labs   CBC:   Lab Results   Component Value Date    WBC 14.2 2022    RBC 2.65 2022    HGB 7.8 2022    HCT 24.0 2022    MCV 90.5 2022    RDW 16.2 2022     2022     CMP:  Lab Results   Component Value Date     2022    K 4.9 2022    CL 93 2022    CO2 20 2022    BUN 74 2022    CREATININE 9.5 2022    GFRAA 7 2022    GFRAA >60 2013    AGRATIO 1.2 2022    LABGLOM 6 2022    GLUCOSE 58 2022    PROT 6.2 2022    PROT 6.7 2012    CALCIUM 8.8 2022    BILITOT <0.2 2022    ALKPHOS 103 2022    AST 10 2022    ALT 9 2022     PT/INR:  No results found for: PTINR  HgBA1c:  Lab Results   Component Value Date    LABA1C 9.4 2022     Lab Results   Component Value Date    CKTOTAL 167 2020    TROPONINI 0.18 (H) 2022         Cardiac Data     Last EK2022 0845 aflutter with variable block, NSST changes, no ischemia    Echo: 1/12/2022   Limited only f/u for LVEF and RVF. The left ventricular systolic function is mildly reduced with an ejection   fraction of 40-45 %. There is hypokinesis of the apex and inferior walls. There is a very small circumferential pericardial effusion noted. No tamponade physiology. The right ventricle is normal in size and function. ECHO 9/11/2021   Technically difficult examination. Image quality limits accurate wall motion   and ejection fraction analysis. The left ventricular systolic function is moderately reduced with an ejection fraction of 40-45 %. Mild concentric left ventricular hypertrophy. Definity contrast administered with no evidence of left ventricular mass or thrombus noted. Moderate global hypokinesis with regional variation. Grade II diastolic dysfunction with elevated filing pressure. Compared to last echo on 1/5/2021 (EF 55%), left ventricle systolic function has decreased. The left atrium is mildly dilated. Individual aortic valve leaflets are not clearly visualized. Normally functioning TAVR 29 mm Langford vanita S3 bioprosthetic valve in   aortic position appears well seated with a max velocity of 2.27 m/sec,   maximum gradient of 21 mmHg and a mean gradient of 13 mmHg. Trace aortic valve regurgitation. Mild mitral regurgitation. A bubble study was performed and fails to show evidence of shunting. Cath: 1/14/2022  Findings:   1. Left main coronary artery was normal. It gave off the left anterior descending artery and left circumflex.     2. Left anterior descending artery has 60% severe atherosclerotic disease. It was moderate in size. It gave off septal perforators and a moderate sized diagonal branch. The LAD covered the entire apex of the left ventricle.      3. Left circumflex has 60% severe atherosclerotic disease. It was moderate in size. There was a moderate sized obtuse marginal branch.     4. Right coronary artery has 99% severe atherosclerotic disease. It was moderate in size and was the dominant artery.     Studies:     CTPA:  There is a suboptimal contrast bolus, limiting evaluation of the segmental   and subsegmental branches.  No central pulmonary embolism is detected.       Patchy airspace disease in the lower lungs bilaterally, greater on the left,   most compatible with multifocal pneumonia or aspiration.  Small left pleural   effusion has developed.       Mildly enlarged mediastinal lymph nodes, similar compared to the previous   exam.  These are most likely reactive, but a follow-up chest CT in   approximately 3 months is recommended to ensure resolution.       The small nodule seen previously within the left lower lobe is obscured on   the current examination.  Please refer to the previous exam for   recommendations. Assessment and Plan      1. Atrial flutter with RVR: new onset  2. Elevated trops: flat   3. CAD:   - 1/14/2022 PCI to RCA  4. Ischemic/Nonischemic cardiomyopathy: LVEF 40%  5. Hx of Syncope and Heart block:              - resolved off gladys agents. 6. Bicuspid AV/AS              10/2019 s/p TAVR, last echo mean gradient 13mmHg  7. PVD  8. HTN  9. Hx BLE wekness and frequent falls: neuro following. 10 Cellulitis with Sepsis  11. DM with neuropathy  12. ESRD  13 Chronic hyponatremia  14. Normocytic anemia  15 Delerium      PLAN  1. New afib    CHADS VASC 6 (CHF, HTN, DM, CVA, PAD)   - Rate now better controlled on Toprol. - Ideally would start heparin but patient has been pulling at all lines and unable to dialysis so unlikely able to start heparin. Lovenox not possible   - I might see if he stabilizes 1 more day before starting an oral agent for anticoagulation  2. We will need to wait mental status clears to evaluate chest pain.   No evidence of active ischemia at this time patient is very recently status post PCI to the RCA   -Of note if patient goes on anticoagulation to prevent triple therapy I would hold his aspirin but continue oral anticoagulant and Plavix  3. Otherwise continue Imdur, lisinopril, Toprol, pravastatin                  Patient Active Problem List   Diagnosis    [facility]epsis without acute organ dysfunction [facility]    Asthma-COPD overlap syndrome ([facility]    CAD S/P percutaneous coronary angioplasty    PVD (peripheral vascular disease) ([facility]    Bicuspid aortic valve    Bilateral hilar adenopathy syndrome    [facility] in [facility]    Essential hypertension    Diabetic neuropathy [facility]    Type 2 diabetes, uncontrolled, with neuropathy ([facility]    Passive smoke exposure    Depression with anxiety    Pneumonia of right upper lobe due to infectious organism    Obesity (BMI 30-39. 9)    ZAINAB on CPAP    Degeneration of lumbar or lumbosacral intervertebral disc    Lumbar radiculopathy    Lumbosacral spondylosis without myelopathy    Biliary colic    Symptomatic cholelithiasis    Gastroparesis due to DM ([facility]    Angina, class IV ([facility])    Dyspnea    Dyslipidemia    Acute on chronic combined systolic and diastolic heart failure ([facility])    Ischemic cardiomyopathy    Tobacco abuse    CVA (cerebral vascular accident) ([facility]    History of CVA (cerebrovascular accident)    Type 2 diabetes mellitus without complication, without long-term current use of insulin ([facility])    ZAINAB (obstructive sleep apnea)    Pleural effusion    Chronic anemia    Nonrheumatic aortic valve stenosis    Mucus plugging of bronchi    Hemodialysis-associated hypotension    ESRD (end stage renal disease) ([facility])    Hypotension due to drugs    Acute diastolic CHF (congestive heart failure) ([facility])    Neuromuscular disorder ([facility])    Renovascular hypertension    Mixed hyperlipidemia    Cigarette nicotine dependence in remission    Pulmonary edema    Fluid overload    Anemia of chronic disease    SOB (shortness of breath) on exertion    Steal syndrome of dialysis vascular access (HCC)    Chronic, continuous use of opioids    Chronic bronchitis (HCC)    Nasal congestion    Hypercholesteremia    Bradycardia    S/P TAVR (transcatheter aortic valve replacement)    Syncope and collapse    Atrial fibrillation (HCC)    Bilateral leg weakness    GBS (Guillain-Powersite syndrome) (HCC)    Sinus pause    Weakness of both lower extremities    Sinus bradycardia    Ataxia    Peripheral vascular occlusive disease (HCC)    Cellulitis of right foot    Iliac artery occlusion, right (HCC)    Cellulitis and abscess of hand    Type 2 diabetes mellitus with hyperglycemia (HCC)    Acute encephalopathy    Acute hypoxemic respiratory failure (HCC)    Acute cerebrovascular accident ([facility]A) [facility]    Speech problem    Urinary tract infection with hematuria    Respiratory failure with hypoxia (HCC)    Acute respiratory failure with hypercapnia (HCC)    Acute pulmonary edema (HCC)    Obesity, Class II, BMI 35-39.9    Grade II diastolic dysfunction    Shock circulatory (HCC)    Smoker    Normocytic normochromic anemia    NSTEMI (non-ST elevated myocardial infarction) ([facility]    SIRS (systemic inflammatory response syndrome) ([facility]           Thank you for allowing us to participate in the care of [facility]. Please call me with any questions [phone]. Nela Watters MD, [address] [facility] Cardiologist  [facility]  [phone] [facility]  [phone] [address] [place] [address]  2/25/2022 11:32 AM    I will address the patient's cardiac risk factors and adjusted pharmacologic treatment as needed. In addition, I have reinforced the need for patient directed risk factor modification. All questions and concerns were addressed to the patient/family. Alternatives to my treatment were discussed. The note was completed using EMR.  Every effort was made to ensure accuracy; however, inadvertent computerized transcription errors may be present.

## 2022-02-25 NOTE — PROGRESS NOTES
Writer concerned about pt BP this shift. Nurse paged hospitalist to reassess. Bed alarm sounded in pt room, writer and staff entered room and pt had been pulling off heart monitor leads and also had a hand on his vas cath trying to pull it out as well. Applied restraints for pt safety. Paged hospitalist for orders. NP at bedside to speak with patient then also came out and outlined parameters for contacting hospitalist.  New orders placed.

## 2022-02-25 NOTE — CONSULTS
Nutrition Note    RD received consult for CHF education. Attempted education earlier this admission, handouts left at bedside. Has previously received diet education multiple times on previous admissions. Will continue to monitor per nutrition standards of care.      Jasson Maldonado, MS, RD, LD on 2/25/2022 at 7:55 AM  Office: [phone] What Type Of Note Output Would You Prefer (Optional)?: Standard Output How Severe Is Your Skin Lesion?: mild Has Your Skin Lesion Been Treated?: not been treated Is This A New Presentation, Or A Follow-Up?: Skin Lesions

## 2022-02-25 NOTE — PROGRESS NOTES
Pt completed only 40 mins of hemodialysis before catheter malfunctioned. Unable to achieve the minimum 200ml/min blood flow rate to sustain the procedure. Cathflo indwelling overnight and attempt hd again tomorrow per Dr. Calixto Oar.

## 2022-02-25 NOTE — PROGRESS NOTES
Pt extremely restless, pulling at lines, removing O2 and tele, attempting to get out of bed with eyes closed, not following commands. MD paged for sitter or restraint order.

## 2022-02-25 NOTE — PROGRESS NOTES
Progress Note    HISTORY     CC:  Pain            We are following for ESRD         Subjective/   HPI:  Somnolent this morning. He says that pain is better. S/p I&D. No new complaints     ROS:  Constitutional:  + fevers, No Chills, + weakness  Cardiovascular:  No palpations, no edema  Respiratory:  No wheezing, no cough  Skin:  No rash, no itching  :  No hematuria, no dysuria     Social Hx:  No Family at the bedside     Past Medical and Surgical History:  - Reviewed, no changes     EXAM       Objective/     Vitals:    02/25/22 0740 02/25/22 0800 02/25/22 0810 02/25/22 1143   BP: (!) 85/33  100/70 (!) 109/43   Pulse: 81  86 87   Resp:   24 24   Temp:    98.9 °F (37.2 °C)   TempSrc:    Axillary   SpO2:  (!) 88% 96% 97%   Weight:       Height:         24HR INTAKE/OUTPUT:      Intake/Output Summary (Last 24 hours) at 2/25/2022 1216  Last data filed at 2/25/2022 1110  Gross per 24 hour   Intake 0 ml   Output 5 ml   Net -5 ml     Constitutional:  Ill appearing, NAD  Eyes:  Pupils reactive, sclera clear   Neck:  Normal thyroid, no masses   Cardiovascular:  Regular, no rub  Respiratory:  No distress, no wheezing  Psychiatry:  Flat mood/affect, somnolent   Abdomen: +bs, soft, nt, no masses   Musculoskeletal: No LE edema, no clubbing   Lymphatics:  No LAD in neck, no supraclavicular nodes   Access:  Left [facility]       MEDICAL DECISION MAKING       Data/  Recent Labs     02/23/22  0800 02/24/22  0311 02/25/22  0624   WBC 16.0* 13.0* 14.2*   HGB 7.6* 7.9* 7.8*   HCT 23.5* 24.1* 24.0*   MCV 90.7 89.1 90.5    299 353     Recent Labs     02/23/22  0800 02/24/22  0311 02/25/22  0624   * 130* 134*   K 5.9* 4.9 4.9   CL 92* 90* 93*   CO2 18* 23 20*   GLUCOSE 78 133* 58*   BUN 99* 57* 74*   CREATININE 11.3* 7.5* 9.5*   LABGLOM 5* 8* 6*   GFRAA 6* 9* 7*       Assessment/     ESRD:  On dialysis MWF at [facility]. Failed PD. Not transplant candidate.   Had fistula ligated due to dialysis associated steal.  HD today with 3 kg UF. Post weight was 118 kg. He has not reached his target of 116 kg in over 1 months      Anemia of chronic disease-CKD:  - Hb is below target, on DAVON at the dialysis unit     Hyponatremia:  Due to volume overload in setting of ESRD    Hyperkalemia:  Due to ESRD, had dialysis today       Perineal Pain:  Surgery following and s/p I&D.  On broad spectrum IV antibiotics and pain medications    Plan/     ESRD per regular schedule  EPO with dialysis   Antibiotics    -----------------------------  Ciro Cabrera M.D.   [facility]

## 2022-02-26 ENCOUNTER — APPOINTMENT (OUTPATIENT)
Dept: MRI IMAGING | Age: 52
DRG: 871 | End: 2022-02-26
Payer: COMMERCIAL

## 2022-02-26 LAB
A/G RATIO: 1.3 (ref 1.1–2.2)
ALBUMIN SERPL-MCNC: 3.4 G/DL (ref 3.4–5)
ALP BLD-CCNC: 102 U/L (ref 40–129)
ALT SERPL-CCNC: 9 U/L (ref 10–40)
ANION GAP SERPL CALCULATED.3IONS-SCNC: 24 MMOL/L (ref 3–16)
AST SERPL-CCNC: 9 U/L (ref 15–37)
BASOPHILS ABSOLUTE: 0.1 K/UL (ref 0–0.2)
BASOPHILS RELATIVE PERCENT: 0.4 %
BILIRUB SERPL-MCNC: <0.2 MG/DL (ref 0–1)
BUN BLDV-MCNC: 82 MG/DL (ref 7–20)
CALCIUM SERPL-MCNC: 8.4 MG/DL (ref 8.3–10.6)
CHLORIDE BLD-SCNC: 91 MMOL/L (ref 99–110)
CO2: 17 MMOL/L (ref 21–32)
CREAT SERPL-MCNC: 10.7 MG/DL (ref 0.9–1.3)
EOSINOPHILS ABSOLUTE: 0.3 K/UL (ref 0–0.6)
EOSINOPHILS RELATIVE PERCENT: 1.9 %
GFR AFRICAN AMERICAN: 6
GFR NON-AFRICAN AMERICAN: 5
GLUCOSE BLD-MCNC: 107 MG/DL (ref 70–99)
GLUCOSE BLD-MCNC: 138 MG/DL (ref 70–99)
GLUCOSE BLD-MCNC: 166 MG/DL (ref 70–99)
GLUCOSE BLD-MCNC: 178 MG/DL (ref 70–99)
GLUCOSE BLD-MCNC: 202 MG/DL (ref 70–99)
GLUCOSE BLD-MCNC: 92 MG/DL (ref 70–99)
GLUCOSE BLD-MCNC: 96 MG/DL (ref 70–99)
GLUCOSE BLD-MCNC: 97 MG/DL (ref 70–99)
HCT VFR BLD CALC: 23.7 % (ref 40.5–52.5)
HEMOGLOBIN: 7.8 G/DL (ref 13.5–17.5)
LYMPHOCYTES ABSOLUTE: 0.5 K/UL (ref 1–5.1)
LYMPHOCYTES RELATIVE PERCENT: 4 %
MCH RBC QN AUTO: 29.4 PG (ref 26–34)
MCHC RBC AUTO-ENTMCNC: 32.7 G/DL (ref 31–36)
MCV RBC AUTO: 89.9 FL (ref 80–100)
MONOCYTES ABSOLUTE: 0.9 K/UL (ref 0–1.3)
MONOCYTES RELATIVE PERCENT: 6.6 %
NEUTROPHILS ABSOLUTE: 11.7 K/UL (ref 1.7–7.7)
NEUTROPHILS RELATIVE PERCENT: 87.1 %
PDW BLD-RTO: 16 % (ref 12.4–15.4)
PERFORMED ON: ABNORMAL
PERFORMED ON: NORMAL
PERFORMED ON: NORMAL
PLATELET # BLD: 373 K/UL (ref 135–450)
PMV BLD AUTO: 7.8 FL (ref 5–10.5)
POTASSIUM REFLEX MAGNESIUM: 5.3 MMOL/L (ref 3.5–5.1)
RBC # BLD: 2.64 M/UL (ref 4.2–5.9)
SODIUM BLD-SCNC: 132 MMOL/L (ref 136–145)
TOTAL PROTEIN: 6 G/DL (ref 6.4–8.2)
VANCOMYCIN RANDOM: 17.9 UG/ML
WBC # BLD: 13.4 K/UL (ref 4–11)

## 2022-02-26 PROCEDURE — 6370000000 HC RX 637 (ALT 250 FOR IP): Performed by: NURSE PRACTITIONER

## 2022-02-26 PROCEDURE — 2700000000 HC OXYGEN THERAPY PER DAY

## 2022-02-26 PROCEDURE — 6360000002 HC RX W HCPCS: Performed by: NURSE PRACTITIONER

## 2022-02-26 PROCEDURE — 2580000003 HC RX 258: Performed by: SURGERY

## 2022-02-26 PROCEDURE — 6370000000 HC RX 637 (ALT 250 FOR IP): Performed by: SURGERY

## 2022-02-26 PROCEDURE — 80053 COMPREHEN METABOLIC PANEL: CPT

## 2022-02-26 PROCEDURE — 1200000000 HC SEMI PRIVATE

## 2022-02-26 PROCEDURE — 36415 COLL VENOUS BLD VENIPUNCTURE: CPT

## 2022-02-26 PROCEDURE — 80202 ASSAY OF VANCOMYCIN: CPT

## 2022-02-26 PROCEDURE — 6360000002 HC RX W HCPCS: Performed by: STUDENT IN AN ORGANIZED HEALTH CARE EDUCATION/TRAINING PROGRAM

## 2022-02-26 PROCEDURE — 94660 CPAP INITIATION&MGMT: CPT

## 2022-02-26 PROCEDURE — 6360000002 HC RX W HCPCS

## 2022-02-26 PROCEDURE — 2580000003 HC RX 258: Performed by: STUDENT IN AN ORGANIZED HEALTH CARE EDUCATION/TRAINING PROGRAM

## 2022-02-26 PROCEDURE — 6360000002 HC RX W HCPCS: Performed by: SURGERY

## 2022-02-26 PROCEDURE — 85025 COMPLETE CBC W/AUTO DIFF WBC: CPT

## 2022-02-26 PROCEDURE — 70551 MRI BRAIN STEM W/O DYE: CPT

## 2022-02-26 PROCEDURE — 94761 N-INVAS EAR/PLS OXIMETRY MLT: CPT

## 2022-02-26 PROCEDURE — 99233 SBSQ HOSP IP/OBS HIGH 50: CPT | Performed by: NURSE PRACTITIONER

## 2022-02-26 PROCEDURE — 94640 AIRWAY INHALATION TREATMENT: CPT

## 2022-02-26 PROCEDURE — 90935 HEMODIALYSIS ONE EVALUATION: CPT

## 2022-02-26 RX ORDER — HEPARIN SODIUM 1000 [USP'U]/ML
4700 INJECTION, SOLUTION INTRAVENOUS; SUBCUTANEOUS PRN
Status: DISCONTINUED | OUTPATIENT
Start: 2022-02-26 | End: 2022-03-03 | Stop reason: HOSPADM

## 2022-02-26 RX ORDER — HEPARIN SODIUM 1000 [USP'U]/ML
INJECTION, SOLUTION INTRAVENOUS; SUBCUTANEOUS
Status: COMPLETED
Start: 2022-02-26 | End: 2022-02-26

## 2022-02-26 RX ADMIN — SODIUM CHLORIDE, PRESERVATIVE FREE 10 ML: 5 INJECTION INTRAVENOUS at 20:26

## 2022-02-26 RX ADMIN — ASPIRIN 81 MG 81 MG: 81 TABLET ORAL at 08:31

## 2022-02-26 RX ADMIN — HEPARIN SODIUM 5000 UNITS: 5000 INJECTION INTRAVENOUS; SUBCUTANEOUS at 13:25

## 2022-02-26 RX ADMIN — SODIUM CHLORIDE, PRESERVATIVE FREE 10 ML: 5 INJECTION INTRAVENOUS at 08:30

## 2022-02-26 RX ADMIN — CLOPIDOGREL BISULFATE 75 MG: 75 TABLET ORAL at 08:31

## 2022-02-26 RX ADMIN — MEROPENEM 500 MG: 500 INJECTION, POWDER, FOR SOLUTION INTRAVENOUS at 09:53

## 2022-02-26 RX ADMIN — APIXABAN 2.5 MG: 2.5 TABLET, FILM COATED ORAL at 20:57

## 2022-02-26 RX ADMIN — OXYCODONE 10 MG: 5 TABLET ORAL at 13:25

## 2022-02-26 RX ADMIN — PRAVASTATIN SODIUM 40 MG: 40 TABLET ORAL at 08:31

## 2022-02-26 RX ADMIN — INSULIN LISPRO 3 UNITS: 100 INJECTION, SOLUTION INTRAVENOUS; SUBCUTANEOUS at 22:40

## 2022-02-26 RX ADMIN — HEPARIN SODIUM 4700 UNITS: 1000 INJECTION, SOLUTION INTRAVENOUS; SUBCUTANEOUS at 19:01

## 2022-02-26 RX ADMIN — HEPARIN SODIUM 4700 UNITS: 1000 INJECTION INTRAVENOUS; SUBCUTANEOUS at 19:01

## 2022-02-26 RX ADMIN — QUETIAPINE FUMARATE 50 MG: 25 TABLET ORAL at 21:00

## 2022-02-26 RX ADMIN — TIOTROPIUM BROMIDE AND OLODATEROL 2 PUFF: 3.124; 2.736 SPRAY, METERED RESPIRATORY (INHALATION) at 08:46

## 2022-02-26 RX ADMIN — ACETAMINOPHEN 650 MG: 325 TABLET ORAL at 05:54

## 2022-02-26 RX ADMIN — TRAZODONE HYDROCHLORIDE 150 MG: 50 TABLET ORAL at 21:00

## 2022-02-26 RX ADMIN — HEPARIN SODIUM 5000 UNITS: 5000 INJECTION INTRAVENOUS; SUBCUTANEOUS at 05:52

## 2022-02-26 RX ADMIN — ACETAMINOPHEN 650 MG: 325 TABLET ORAL at 21:00

## 2022-02-26 RX ADMIN — TORSEMIDE 100 MG: 100 TABLET ORAL at 08:31

## 2022-02-26 RX ADMIN — Medication 10 ML: at 09:53

## 2022-02-26 RX ADMIN — HALOPERIDOL LACTATE 5 MG: 5 INJECTION, SOLUTION INTRAMUSCULAR at 09:56

## 2022-02-26 RX ADMIN — SODIUM CHLORIDE 25 ML: 9 INJECTION, SOLUTION INTRAVENOUS at 20:30

## 2022-02-26 RX ADMIN — HALOPERIDOL LACTATE 5 MG: 5 INJECTION, SOLUTION INTRAMUSCULAR at 02:46

## 2022-02-26 RX ADMIN — DULOXETINE HYDROCHLORIDE 60 MG: 60 CAPSULE, DELAYED RELEASE ORAL at 08:31

## 2022-02-26 RX ADMIN — VANCOMYCIN HYDROCHLORIDE 750 MG: 750 INJECTION, POWDER, LYOPHILIZED, FOR SOLUTION INTRAVENOUS at 20:32

## 2022-02-26 ASSESSMENT — PAIN SCALES - GENERAL
PAINLEVEL_OUTOF10: 3
PAINLEVEL_OUTOF10: 0
PAINLEVEL_OUTOF10: 0
PAINLEVEL_OUTOF10: 4
PAINLEVEL_OUTOF10: 0
PAINLEVEL_OUTOF10: 6
PAINLEVEL_OUTOF10: 2

## 2022-02-26 NOTE — PROGRESS NOTES
02/25/22 2223   NIV Type   NIV Started/Stopped On   Equipment Type v60   Mode Bilevel   Mask Type Full face mask   Mask Size Medium   Settings/Measurements   IPAP 16 cmH20   CPAP/EPAP 10 cmH2O   Rate Ordered 10   Resp 12   FiO2  30 %   Vt Exhaled 708 mL   Minute Volume 9.7 Liters   Mask Leak (lpm) 50 lpm   Comfort Level Good   Using Accessory Muscles No   SpO2 96   Alarm Settings   Alarms On Y   Press Low Alarm 6 cmH2O   High Pressure Alarm 30 cmH2O   Resp Rate Low Alarm 6   High Respiratory Rate 40 br/min

## 2022-02-26 NOTE — PROGRESS NOTES
[facility]   Daily Progress Note    Admit Date:  2/21/2022  HPI:    Chief Complaint   Patient presents with    Shortness of Breath     From dialysis. became very SOB at dialysis. Hx of COPD. No baseline O2.  97% room air on arrival. received very little dialysis prior to EMS arrival.       Audi Crandall presented with perianal pain from cellulitis as well as chest pain and shortness of breath. He underwent I&D of perineal abscess. In attempts to further diuresis with hemodialysis this has been difficult due to lying in patient's altered mental status. He was found to have new onset atrial fibrillation. Cardiology consulted    Hx of CAD s/p PCI to LAD in 2015, RCA 1/2022, AS s/p TAVR 2019, ICM, s/d CHF, HYPERTENSION, HLD, DM, PAD s/p PTA R iliac artery, ESRD on HD, chronic anemia, ZAINAB on CPAP, COPD, and hx of CVA. Subjective:  Mr. Antonio López is oriented to person and place though is lethargic and not cooperating completely. Sitter at bedside.   Discussed with Dr. hCencho Baires    Objective:   Patient Vitals for the past 24 hrs:   BP Temp Temp src Pulse Resp SpO2 Weight   02/26/22 1117 (!) 89/59 97.6 °F (36.4 °C) Axillary 93 18 98 % --   02/26/22 0846 -- -- -- -- -- 96 % --   02/26/22 0725 (!) 98/58 99.6 °F (37.6 °C) Axillary 103 20 94 % --   02/26/22 0605 -- -- -- -- -- -- 261 lb 7.5 oz (118.6 kg)   02/26/22 0545 (!) 120/58 -- -- 91 -- -- --   02/26/22 0407 -- -- -- -- 22 -- --   02/26/22 0245 (!) 150/55 -- Oral -- 16 -- --   02/26/22 0224 -- 98.9 °F (37.2 °C) Oral -- -- -- --   02/26/22 0024 (!) 142/65 98.1 °F (36.7 °C) Axillary 89 14 98 % --   02/26/22 0014 -- -- -- -- 16 -- --   02/25/22 2223 -- -- -- -- 12 -- --   02/25/22 2130 (!) 126/59 94.7 °F (34.8 °C) Axillary 90 20 96 % --   02/25/22 1601 (!) 149/69 97.7 °F (36.5 °C) Axillary 91 22 96 % --   02/25/22 1530 133/87 98.5 °F (36.9 °C) -- 103 20 98 % --   02/25/22 1350 (!) 115/57 98.7 °F (37.1 °C) -- 92 -- -- --       Intake/Output Summary (Last 24 hours) at 2/26/2022 1339  Last data filed at 2/26/2022 1026  Gross per 24 hour   Intake 120 ml   Output --   Net 120 ml     Wt Readings from Last 3 Encounters:   02/26/22 261 lb 7.5 oz (118.6 kg)   02/10/22 257 lb (116.6 kg)   02/04/22 262 lb 5.6 oz (119 kg)         ASSESSMENT:   1. A. fib, new: Persistent, rate controlled  2. Altered mental status: MRI notable for new CVA (not hemorrhagic)  3. CAD: s/p recent PCI to RCA in January 2022, history of PCI to LAD, on aspirin, Plavix, statin and beta-blocker  4. Aortic stenosis due to bicuspid aortic valve, status post TAVR:  5. Ischemic cardiomyopathy: LVEF 40-45%  6. CHF, chronic combined: Fluid management per dialysis  7. ESRD on HD: Per nephrology  8. Diabetes mellitus: Poorly controlled  9. PAD      PLAN:  1. Start Eliquis 2.5 mg p.o. twice daily for anticoagulation  2. Stop aspirin, continue Plavix  3. Lisinopril being held due to hypotension  4. Continue metoprolol 50 mg daily as BP allows  5. Daily labs, daily weights, HD planned for today  6.  Agree with further discussion regarding goals of care    JAY Miller CNP, 2/26/2022, 1:39 PM  [place]    [phone]       Telemetry: A. fib 90s  NYHA: IV    Physical Exam:  General:  Awake, alert, restless, picking at the air, unable to complete sentences or answer questions  Skin:  Warm and dry  Neck:  JVP difficult to assess  Chest: Coarse throughout anteriorly  Cardiovascular:  RRR, normal S1S2, distant heart tones though no appreciable MRG  Abdomen:  Soft, nontender, +bowel sounds  Extremities: 1+ BLE edema      Medications:    vancomycin  750 mg IntraVENous Once    meropenem  500 mg IntraVENous Q24H    epoetin siddharth-epbx  10,000 Units IntraVENous Q MWF    acetaminophen  650 mg Oral 3 times per day    tiotropium-olodaterol  2 puff Inhalation Daily    aspirin  81 mg Oral Daily    calcium acetate  667 mg Oral TID WC    clopidogrel  75 mg Oral Daily    DULoxetine  60 mg Oral Daily    [Held by provider] gabapentin  100 mg Oral TID    [Held by provider] isosorbide mononitrate  30 mg Oral Daily    [Held by provider] lisinopril  10 mg Oral Daily    metoprolol succinate  50 mg Oral Daily    QUEtiapine  50 mg Oral Nightly    torsemide  100 mg Oral Daily    traZODone  150 mg Oral Nightly    linaclotide  145 mcg Oral QAM AC    pravastatin  40 mg Oral Daily    insulin lispro  0-18 Units SubCUTAneous TID WC    insulin lispro  0-9 Units SubCUTAneous Nightly    sodium chloride flush  5-40 mL IntraVENous 2 times per day    heparin (porcine)  5,000 Units SubCUTAneous 3 times per day    vancomycin (VANCOCIN) intermittent dosing (placeholder)   Other RX Placeholder      dextrose      sodium chloride         Lab Data: Lab results independently reviewed and analyzed by myself 2/26/2022    CBC:   Recent Labs     02/24/22 0311 02/25/22 0624 02/26/22  0653   WBC 13.0* 14.2* 13.4*   HGB 7.9* 7.8* 7.8*    353 373     BMP:    Recent Labs     02/24/22 0311 02/25/22 0624 02/26/22  0653   * 134* 132*   K 4.9 4.9 5.3*   CO2 23 20* 17*   BUN 57* 74* 82*   CREATININE 7.5* 9.5* 10.7*     INR:  No results for input(s): INR in the last 72 hours. BNP:    Recent Labs     02/24/22 0311   PROBNP 50,564*     Cardiac Enzymes: No results for input(s): TROPONINI in the last 72 hours. Lipids:   Lab Results   Component Value Date    TRIG 214 11/30/2021    TRIG 187 10/12/2021    HDL 53 11/30/2021    HDL 43 10/12/2021    LDLCALC 155 11/30/2021    LDLCALC 128 10/12/2021    LDLDIRECT 199 09/04/2014    LDLDIRECT 172 03/07/2014       Cardiac Imaging:    Coronary angiogram 1/14/2022:  1. Left heart catheterization  2. Selective left and right coronary angiogram  3. PCI of the RCA with PATRICIA  Procedure Findings:  1. 99% mid RCA  2. 60-70% CIRC and DIAG  3.  Elevated left heart hemodynamics              ~LVEDP 30       Details:              Leander Izaguirre was brought to the cardiac catheterization lab in a fasting state after informed consent was obtained. If the patient was able to provide written consent, it was obtained. The patient's vitals were monitored through out the procedure. The patient was sedated using the appropriate levels of sedation and ASA guidelines. The appropriate access site area was prepped and drapped in a sterile fashion. The area was anesthetized with 2% lidocaine. Using the modified Seldinger technique, an arterial sheath was introduced into the arterial access site using a single anterior wall puncture. The sheath was flushed and prepped in usual fashion. Catheters used during the procedure included 5 Bengali TIG 4.0 catheter. The catheters were advanced and removed over a .035\" wire, into the appropriate positions. Multiple angiographic views were obtained. An LV gram was not obtained. ~The interventional portion of the procedure was completed utilizing a multipurpose 6 [place] guide. Multipurpose guide was advanced into the right coronary ostium. A gentleman therapy aspirin, Plavix, Angiomax was initiated. A BMW wire was advanced into the distal right coronary artery. The lesion was initially predilated with a 2.75 x 15 mm Euphora balloon followed by a 3.5 mm x 20 mm Euphora balloon and subsequently stented with a resolute [place] 4.0 mm x 38 mm balloon. We did experience a no reflow phenomenon. We separately done. A twin pass catheter and then did local drug delivery with 200 mcg of nitroprusside and 200 mcgnitroglycerin. Provided restoration of SCARLETT-3 flow. Findings:  1. Left main coronary artery was normal. It gave off the left anterior descending artery and left circumflex. 2. Left anterior descending artery has 60% severe atherosclerotic disease. It was moderate in size. It gave off septal perforators and a moderate sized diagonal branch. The LAD covered the entire apex of the left ventricle.    3. Left circumflex has 60% severe atherosclerotic disease. It was moderate in size. There was a moderate sized obtuse marginal branch. 4. Right coronary artery has 99% severe atherosclerotic disease. It was moderate in size and was the dominant artery. CONCLUSIONS:  1. Apercutaneous coronary intervention of the right coronary artery utilizing a single resolute [place] drug-eluting stent  ASSESSMENT/RECOMMENDATIONS:  1. Dual antiplatelet therapy  2. Medical management of the remaining coronary disease     Echo 1/2022:   Limited only f/u for LVEF and RVF. The left ventricular systolic function is mildly reduced with an ejection   fraction of 40-45 %. There is hypokinesis of the apex and inferior walls. There is a very small circumferential pericardial effusion noted. No tamponade physiology. The right ventricle is normal in size and function. Echo 7/9/2020:  Low Normal systolic function with an estimated ejection fraction of 50%. Left ventricular function and wall motion is difficult to estimate due to   poor endocardial visualization. Grade I diastolic dysfunction with normal filing pressure. Normally functioning TAVR 29 mm Langford Leyda S3 bioprosthetic valve in   aortic position appears well seated with a max velocity of 2.11 m/sec,   maximum gradient of 18 mmHg and a mean gradient of 8 mmHg. There is no evidence of aortic regurgitation  Coronary angiogram 8/22/2019:  LM <20%  LAD patent stent  Cx <20%  RCA 30%  Severe AS by echo  Proceed w/ TAVR  Echo 1/24/2017:  Summary   Left ventricular systolic function is normal with an ejection fraction of   55-60%. No wall motion abnormalities noted. Mild concentric left ventricular hypertrophy. Grade II diastolic dysfunction with elevated filling pressure. Mildly dilated left atrium. Moderate aortic stenosis. Mild aortic regurgitation. Compared to previous study from 10/27/2016, aortic stenosis has not   progressed.         R/LHC 1/23/2017:  LM <20%  LAD 30% w/ patent stent  Cx 20-30%  RCA 20-30%  LVEDP 19  RA 8, RV 41/10, PA 45/14/31, W 15      Nonobstructive CAD  Mild elevated right heart pressures      Echo Oct 2016:   Normal left ventricle size with mild concentric left ventricular hypertrophy.   Normal systolic function with an estimated ejection fraction of 55%.   No regional wall motion abnormalities are seen.   Elevated left ventricular diastolic filling pressure ( E/e' 21 ).  The aortic valve is thickened/calcified with decreased leaflet mobility. Cannot exclude a bicuspid valve.  The aortic valve area is calculated at 1.0 cm2 with a max velocity of 3.36 m/sec, maximum pressure gradient of 45 mmHg and a mean pressure gradient of 23 mmHg. This is c/w moderate aortic stenosis. Color flow demonstrates eccentric jet suggesting mild aortic regurgitation.   Trace mitral and tricuspid regurgitation. Systolic pulmonary artery pressure (SPAP) is normal and estimated at 22 mmHg (RA pressure 3 mmHg).   There is mild concentric left ventricular hypertrophy. MPI 6/18/15: There is a very small and mild fixed posterior-basal defect consistent with  fibrosis. There is no evidence for ischemia. Left ventricular function is reduced with and estimated LVEF of 40%. The LV is severely dilated. CATH: 5/26/15, Dr. Hugo Officer  LM <20%   LAD 70% mid. FFR 0.77  D1 50% prox  Cx 20%  RCA 20%  LVEDP 22mmHg  RA 9, RV 42/10, PA 44/16/31, W 18  PA sat 63%    PTCA LAD  3.0 x 15 PATRICIA  prox portion post 3.5 x 8 NC balloon    DAPT, statin  Resume lasix and ARB at discharge provided Cr stable  Renal fxn will not tolerate higher dose of lasix than 40 daily  Needs smoking cessation, weight loss, exercise  Rec OP sleep eval       Echo 3/26/15:   Left ventricle size is normal.  Mild c LVH. EF 55 % to 60 %. No regional wall motion abnormalities are noted. Normal diastolic filling pattern for age. Mild mitral regurgitation is present.   Mildly dilated left atrium by increased left atrial volume. Aortic valve is mildly thickened and calcified. The aortic valve area is calculated at 1.4 cm2 with a maximum gradient of 32 mmHg and a mean gradient of 18 mmHg. This is c/w mild aortic stenosis. Trace aortic regurgitation. Trivial tricuspid regurgitation. Systolic pulmonary artery pressure (SPAP) is normal and estimated at 14 mmHg (RA pressure 3 mm Hg).

## 2022-02-26 NOTE — PLAN OF CARE
TODAYS DATE:  2/26/2022    Discussed personal risk factors for Stroke /TIA with patient/family, and ways to reduce the risk for a recurrent stroke. Patient's personal risk factors which were identified are:     [] Alcohol Abuse: check with your physician before any alcohol consumption. [x] Atrial fibrillation: may cause blood clots. [] Drug Abuse: Seek help, talk with your doctor  [] Clotting Disorder  [] Diabetes  [] Family history of stroke or heart disease  [] High Blood Pressure/Hypertension: work with your physician.  [] High cholesterol: monitor cholesterol levels with your physician.   [] Overweight/Obesity: work with your physician for your ideal body weight. [x] Physical Inactivity: get regular exercise as directed by your physician. [] Personal history of previous TIA or stroke  [] Poor Diet; decrease salt (sodium) in your diet, follow diet directed by physician. [] Smoking: Cigarette/Cigar: stop smoking. Reviewed the Following Education with Patient and/or Family:   -Signs and Symptoms of Stroke:     (facial droop, weakness/numbness especially on one side, speech difficulty, sudden confusion, sudden loss of vision, sudden severe headache,       sudden loss of balance or having difficulty walking, syncope or seizure)  -How to activate EMS (911)   -Importance of Follow Up Appointment at Discharge   -Importance of Compliance with Medications Prescribed at Discharge     Pt verbalized understanding.      Family Present during Education: yes     Stroke Education Booklet at Bedside: yes     Electronically signed by Jamila Kay RN on 2/26/2022 at 3:53 PM

## 2022-02-26 NOTE — PROGRESS NOTES
Progress Note    HISTORY     CC:  Pain            We are following for ESRD         Subjective/   HPI:  Somnolent this morning. On bilevel. Did get pain medication a few hours ago. Going to MRI. Not able to get dialysis yesterday due to line not working.   Due again today    ROS:  Constitutional:  + fevers, No Chills, + weakness  Cardiovascular:  No palpations, no edema  Respiratory:  No wheezing, no cough  Skin:  No rash, no itching  :  No hematuria, no dysuria     Social Hx:  No Family at the bedside     Past Medical and Surgical History:  - Reviewed, no changes     EXAM       Objective/     Vitals:    02/26/22 0605 02/26/22 0725 02/26/22 0846 02/26/22 1117   BP:  (!) 98/58  (!) 89/59   Pulse:  103  93   Resp:  20  18   Temp:  99.6 °F (37.6 °C)  97.6 °F (36.4 °C)   TempSrc:  Axillary  Axillary   SpO2:  94% 96% 98%   Weight: 261 lb 7.5 oz (118.6 kg)      Height:         24HR INTAKE/OUTPUT:      Intake/Output Summary (Last 24 hours) at 2/26/2022 1156  Last data filed at 2/26/2022 1026  Gross per 24 hour   Intake 120 ml   Output --   Net 120 ml     Constitutional:  Ill appearing, NAD  Eyes:  Pupils reactive, sclera clear   Neck:  Normal thyroid, no masses   Cardiovascular:  Regular, no rub  Respiratory:  No distress, no wheezing  Psychiatry:  Flat mood/affect, somnolent   Abdomen: +bs, soft, nt, no masses   Musculoskeletal: No LE edema, no clubbing   Lymphatics:  No LAD in neck, no supraclavicular nodes   Access:  Left [facility]       MEDICAL DECISION MAKING       Data/  Recent Labs     02/24/22 0311 02/25/22  0624 02/26/22  0653   WBC 13.0* 14.2* 13.4*   HGB 7.9* 7.8* 7.8*   HCT 24.1* 24.0* 23.7*   MCV 89.1 90.5 89.9    353 373     Recent Labs     02/24/22  0311 02/25/22  0624 02/26/22  0653   * 134* 132*   K 4.9 4.9 5.3*   CL 90* 93* 91*   CO2 23 20* 17*   GLUCOSE 133* 58* 96   BUN 57* 74* 82*   CREATININE 7.5* 9.5* 10.7*   LABGLOM 8* 6* 5*   GFRAA 9* 7* 6*       Assessment/     ESRD:  On

## 2022-02-26 NOTE — PROGRESS NOTES
Hospitalist Progress Note      PCP: Cat Parada MD    Date of Admission: 2/21/2022    Chief Complaint: perianal pain and dyspnea    Hospital Course: In brief this is a 46 yoM with ESRD and TIIDM presenting with perianal pain concerning for cellulitis and dyspnea likely from volume overload. Hospital course has been complicated by worsening encephalopathy concerning for hypoactive delirium with now acute /subacute thalamic infarct.     Subjective: unchanged from previous exams      Medications:  Reviewed    Infusion Medications    dextrose      sodium chloride       Scheduled Medications    vancomycin  750 mg IntraVENous Once    apixaban  2.5 mg Oral BID    meropenem  500 mg IntraVENous Q24H    epoetin siddharth-epbx  10,000 Units IntraVENous Q MWF    acetaminophen  650 mg Oral 3 times per day    tiotropium-olodaterol  2 puff Inhalation Daily    calcium acetate  667 mg Oral TID WC    clopidogrel  75 mg Oral Daily    DULoxetine  60 mg Oral Daily    [Held by provider] gabapentin  100 mg Oral TID    [Held by provider] isosorbide mononitrate  30 mg Oral Daily    [Held by provider] lisinopril  10 mg Oral Daily    metoprolol succinate  50 mg Oral Daily    QUEtiapine  50 mg Oral Nightly    torsemide  100 mg Oral Daily    traZODone  150 mg Oral Nightly    linaclotide  145 mcg Oral QAM AC    pravastatin  40 mg Oral Daily    insulin lispro  0-18 Units SubCUTAneous TID WC    insulin lispro  0-9 Units SubCUTAneous Nightly    sodium chloride flush  5-40 mL IntraVENous 2 times per day    vancomycin (VANCOCIN) intermittent dosing (placeholder)   Other RX Placeholder     PRN Meds: haloperidol lactate, albuterol, heparin (porcine), prochlorperazine, oxyCODONE, fentanNYL, oxyCODONE, albuterol sulfate HFA, cyclobenzaprine, glucose, glucagon (rDNA), dextrose, sodium chloride flush, sodium chloride, dextrose bolus (hypoglycemia) **OR** dextrose bolus (hypoglycemia)      Intake/Output Summary (Last 24 hours) at 2/26/2022 1647  Last data filed at 2/26/2022 1434  Gross per 24 hour   Intake 240 ml   Output --   Net 240 ml       Physical Exam Performed:    BP (!) 125/56   Pulse 94   Temp 98.7 °F (37.1 °C) (Axillary)   Resp 19   Ht 5' 9\" (1.753 m)   Wt 261 lb 7.5 oz (118.6 kg)   SpO2 98%   BMI 38.61 kg/m²     General appearance: drowsy during exam laying in bed following commands intermittently. HEENT: Pupils equal, round, and reactive to light. Conjunctivae/corneas clear. Neck: Supple, with full range of motion. No rigiditiy  Respiratory:  Normal respiratory effort. Clear to auscultation, bilaterally without Rales/Wheezes/Rhonchi. On NC, coarse upper airway sounds  Cardiovascular: Regular rate and rhythm with normal S1/S2 without murmurs, rubs or gallops. Abdomen: Soft, non-tender, non-distended with normal bowel sounds. Musculoskeletal: No clubbing, cyanosis or edema bilaterally. Full range of motion without deformity. Neurologic:  Drowsy but arouses and exam is non focal, he has asterixes on exam with faint facial fasciculations, no cranial nerve deficits appreciated, -4/5 strength in UE bilaterally, 5/5 strength in LE bilaterally. Capillary Refill: Brisk,3 seconds, normal   Peripheral Pulses: +2 palpable, equal bilaterally       Labs:   Recent Labs     02/24/22 0311 02/25/22 0624 02/26/22  0653   WBC 13.0* 14.2* 13.4*   HGB 7.9* 7.8* 7.8*   HCT 24.1* 24.0* 23.7*    353 373     Recent Labs     02/24/22 0311 02/25/22  0624 02/26/22  0653   * 134* 132*   K 4.9 4.9 5.3*   CL 90* 93* 91*   CO2 23 20* 17*   BUN 57* 74* 82*   CREATININE 7.5* 9.5* 10.7*   CALCIUM 8.7 8.8 8.4     Recent Labs     02/24/22 0311 02/25/22  0624 02/26/22  0653   AST 10* 10* 9*   ALT 8* 9* 9*   BILITOT <0.2 <0.2 <0.2   ALKPHOS 105 103 102     No results for input(s): INR in the last 72 hours. No results for input(s): Lanette Divine in the last 72 hours.     Urinalysis:      Lab Results   Component Value Date    NITRU Negative 02/22/2022    WBCUA 3-5 02/22/2022    BACTERIA Rare 02/22/2022    RBCUA 0-2 02/22/2022    BLOODU TRACE-INTACT 02/22/2022    SPECGRAV 1.015 02/22/2022    GLUCOSEU 250 02/22/2022       Radiology:  MRI BRAIN WO CONTRAST   Final Result   1. Acute/early subacute infarct involving the left thalamus and left parietal   lobe towards the vertex. 2. Cerebral parenchymal volume loss with chronic microvascular white matter   ischemic disease. 3. Chronic infarcts are noted in the periventricular white matter, right   thalamus, right layo, and bilateral cerebellar hemispheres. 4. Worsening bilateral mastoid effusions and paranasal sinus disease. CT HEAD WO CONTRAST   Preliminary Result   Mild cerebral atrophy. Remote lacunar infarcts in the basal ganglia and both   thalami. There is a new area of low attenuation in the mid lateral portion of the left   thalamus consistent with an infarct of indeterminate age. MRI is more   sensitive for evaluation of the brain parenchyma if indicated. CT CHEST PULMONARY EMBOLISM W CONTRAST   Final Result   There is a suboptimal contrast bolus, limiting evaluation of the segmental   and subsegmental branches. No central pulmonary embolism is detected. Patchy airspace disease in the lower lungs bilaterally, greater on the left,   most compatible with multifocal pneumonia or aspiration. Small left pleural   effusion has developed. Mildly enlarged mediastinal lymph nodes, similar compared to the previous   exam.  These are most likely reactive, but a follow-up chest CT in   approximately 3 months is recommended to ensure resolution. The small nodule seen previously within the left lower lobe is obscured on   the current examination. Please refer to the previous exam for   recommendations. XR CHEST PORTABLE   Final Result   No acute process. CT PELVIS WO CONTRAST Additional Contrast? None   Final Result   1.  Small 14 mm ovoid fluid collection in the subcutaneous fat of the right   perineum unchanged from at least 01/01/2021. Minimal adjacent subcutaneous   fat stranding. This may represent a sebaceous cyst with superimposed   infection not excluded. 2. No soft tissue gas or other drainable fluid collection. 3. No acute osseous abnormality. XR CHEST PORTABLE   Final Result   Vascular congestion. No consolidation. Stable cardiomegaly. Assessment/Plan:    Active Hospital Problems    Diagnosis     Acute on chronic combined systolic and diastolic heart failure (HCC) [I50.43]      Priority: High    Dyslipidemia [E78.5]      Priority: High    Type 2 diabetes, uncontrolled, with neuropathy (HCC) [E11.40, E11.65]      Priority: High    Coronary artery disease involving native coronary artery of native heart without angina pectoris [I25.10]      Priority: High    Essential hypertension [I10]      Priority: Medium    Liberty-rectal abscess [K61.1]     Somnolence [R40.0]     Atrial flutter (HCC) [I48.92]     SIRS (systemic inflammatory response syndrome) ([facility]) [R65.10]     ESRD on hemodialysis ([facility] [N18.6, Z99.2]     Elevated troponin [R77.8]     Obesity (BMI 30-39. 9) [E66.9]     Sepsis without acute organ dysfunction (HCC) [A41.9]      Encephalopathy secondary to Acute/subacute thalamic CVA   -CT head and MRI with left thalamic and left parietal lobe, also noted chronic infarcts in periventricular white matter, right thalamus, layo and bilateral hemispheres  -neurology consulted as family would appreciate their input understanding it may not change mgmt  - neuro check, NIHHS ordered, last known normal >24 hours ago  - continue Plavix and Apixaban, statin  - hold gabapentin    Perianal cellulitis w/ sepsis (SIRS criteira WBC 13.1, )  - CT pelvis revealed: small 14 mm ovoid fluid collection in subcutaneous fat of the right perineum unchanged from atleast 01/01/2021.  Minimal adjacent subcutaneous fat stranding. This may represent a sebaceous cyst with superimposed infection. No soft tissue gas or other drainable fluid collection.  -surgical cultures: 1+ GPC cocci, Bld cx NGTD  - abcx can likely complete a 7 day course (end 02/28)  - pain regimen adjusted: acetaminophen scheduled, oxycodone PO for breakthrough pain and IV opioids if PO insufficient. New  Acute hypoxic respiratory failure- likely volume overload from inadequate iHD based on initial CXR but sxs seem to overnight developed hypoxia (88%) on 2L NC. Initially presentation was dyspnea with out hypoxia. Suspect pt may have now aspirated. Ddimer acutely elevated > 2000 but CTA without PE. Covid rapid negative. - continue iHD with nephrology assistance. - overnight/naps CPAP  - wean oxygen as able    TIIDM- a1c 9.4 on 1/12/2022. Hypoglycemia noted. Holding basal and continuing SSI    Afib/aflutter-. CHADSVASC 6.  - consult cardiology 02/25  - now on DOAC    Essential HTN  -continue metoprolol; lisinopril on hold d/t hypotension     HLD  -continue pravastatin    Elevated troponin, 0.15-->0.17 on admission  -no c/o chest pain  -likely 2/2 demand ischemia r/t ESRD  -trend troponin flat  -tele monitoring    Acute on chronic CHF, stable  -strict I&O  -daily weights (dry wt 119)  -continue demadex     Chronic normocytic anemia likely from anemia of chronic disease  -no s/s of bleeding at this time     Anxiety depression  -continue home meds  -mood stable at this time    ESRD iHD MWF  - nephrology following    Obesity  With Body mass index is 35.8 kg/m². Complicating assessment and treatment. Placing patient at risk for multiple co-morbidities as well as early death and contributing to the patient's presentation. Counseled on weight loss     DVT Prophylaxis: heparin ppx  Diet: ADULT DIET; Regular; 4 carb choices (60 gm/meal);  Low Sodium (2 gm) can advance diet post procedure  Code Status: Full Code    PT/OT Eval Status: not ordered    Family updated 02/26, see ACP note, pall care consulted    Dispo - pending clinical course    Cam Ledezma MD

## 2022-02-26 NOTE — PROGRESS NOTES
02/26/22 0407   NIV Type   NIV Started/Stopped On   Equipment Type V60   Mode Bilevel   Mask Type Full face mask   Mask Size Medium   Settings/Measurements   IPAP 16 cmH20   CPAP/EPAP 10 cmH2O   Rate Ordered 12   Resp 22   FiO2  30 %   I Time/ I Time % 1 s   Vt Exhaled 610 mL   Minute Volume 13.6 Liters   Mask Leak (lpm) 46 lpm   Comfort Level Good   Using Accessory Muscles No   Alarm Settings   Alarms On Y   Press Low Alarm 6 cmH2O   High Pressure Alarm 30 cmH2O   Delay Alarm 20 sec(s)   Resp Rate Low Alarm 6   High Respiratory Rate 40 br/min

## 2022-02-26 NOTE — ACP (ADVANCE CARE PLANNING)
ADVANCED CARE PLANNING - REMOTE     Name:Igor Lema       :  1970              MRN:  [id number]  Admission Date: 2022  4:42 PM  Date of Discussion:  2022      Purpose of Encounter: Advanced care planning in light of stroke. Parties in attendance: :Ramesh Singh MD, Family members: Wife Stephanie Carmona. Decisional Capacity:No    Remote ACP visit was performed based on new provisions and guidance offered by [facility] on 2020 in setting of COVID 19 outbreak and in order to preserve personal protective equipment in accordance with the flexibilities announced by CMS on 2020. References:   [email or web address] [email or web address]/Resources/COVID-19/3_18%20Telemed%20Guidance%20Updated%20March%2018. pdf?esa=[email or web address]   [email or web address] pdf     Objective/Medical Story: In brief this is a 46 yoM with ESRD and TIIDM presenting with perianal pain concerning for cellulitis and dyspnea likely from volume overload. Hospital course has been complicated by worsening encephalopathy concerning for hypoactive delirium with now acute /subacue thalamic infarct. I expressed to the family that though this stroke maybe minor his overall prognosis is very poor knowing that he often times does not take his home medications and continues to smoke. Wife mentioned pt previously being DNR/DNI but changed that prior to this admission due to feeling as if that equated to giving up. I expressed that his overall one year prognosis was very poor and that  regardless of his acute stroke, his medication non compliance was concerning. However his acute stroke worsens his prognosis. I asked the wife if palliative care consult to explore GOC and code status would be reasonable and his wife said yes. Active Diagnoses:     Active Hospital Problems    Diagnosis Date Noted    Acute on chronic combined systolic and diastolic heart failure (HCC) [I50.43]      Priority: High    Dyslipidemia [E78.5]      Priority: High    Type [facility] diabetes, uncontrolled[facility] neuropathy [facility] [E11.40, E11.65] 03/04/2014     Priority: High    Coronary artery disease involving native coronary artery of native heart without angina pectoris [I25.10] 05/14/2013     Priority: High    Essential hypertension [I10] 11/14/2013     Priority: Medium    Liberty-rectal abscess [K61.1]     Somnolence [R40.0]     Atrial flutter ([facility] [I48.92]     SIRS (systemic inflammatory response syndrome) ([facility] [R65.10] 02/21/2022    ESRD [facility] hemod[facility] ([facility] [N18.6, Z99.2] 11/22/2017    Elevated troponin [R77.8]     Obesity (BMI 30-39. 9) [E66.9]     Sepsis without acute organ dysfunction ([facility] [A41.9] 12/25/2012       These active diagnoses are of sufficient risk that focused discussion on advance care planning is indicated in order to allow the patient to thoughtfully consider personal goals of care; and if situations arise that prevent the ability to personally give input, to ensure appropriate representation of their personal desires for different levels and agressiveness of care. Goals of Care Determinations: Patient wishes to focus on continue mgmt but would be interested in meeting with pall care to discuss [address] and code status. Code Status: At this time patient wishes to be Full Code    Time Spent:     Total time spent face-to-face in education and discussion: >16 minutes. Electronically signed by Hilary Forman MD on 2/26/2022 at 4:43 PM    Thank you Anna Camargo MD for the opportunity to be involved in this patient's care. If you have any questions or concerns please feel free to contact me at [phone].

## 2022-02-26 NOTE — PROGRESS NOTES
02/26/22 0014   NIV Type   $NIV $Daily Charge   NIV Started/Stopped On   Equipment Type V60   Mode Bilevel   Mask Type Full face mask   Mask Size Medium   Settings/Measurements   IPAP 16 cmH20   CPAP/EPAP 10 cmH2O   Rate Ordered 10   Resp 16   FiO2  30 %   Vt Exhaled 557 mL   Minute Volume 12.6 Liters   Mask Leak (lpm) 55 lpm   Comfort Level Good   Using Accessory Muscles No   Alarm Settings   Alarms On Y

## 2022-02-27 ENCOUNTER — APPOINTMENT (OUTPATIENT)
Dept: GENERAL RADIOLOGY | Age: 52
DRG: 871 | End: 2022-02-27
Payer: COMMERCIAL

## 2022-02-27 LAB
A/G RATIO: 1.2 (ref 1.1–2.2)
ALBUMIN SERPL-MCNC: 3.2 G/DL (ref 3.4–5)
ALP BLD-CCNC: 105 U/L (ref 40–129)
ALT SERPL-CCNC: 7 U/L (ref 10–40)
ANION GAP SERPL CALCULATED.3IONS-SCNC: 21 MMOL/L (ref 3–16)
AST SERPL-CCNC: 12 U/L (ref 15–37)
BASOPHILS ABSOLUTE: 0.1 K/UL (ref 0–0.2)
BASOPHILS RELATIVE PERCENT: 0.4 %
BILIRUB SERPL-MCNC: <0.2 MG/DL (ref 0–1)
BUN BLDV-MCNC: 53 MG/DL (ref 7–20)
CALCIUM SERPL-MCNC: 8.6 MG/DL (ref 8.3–10.6)
CHLORIDE BLD-SCNC: 93 MMOL/L (ref 99–110)
CO2: 21 MMOL/L (ref 21–32)
CREAT SERPL-MCNC: 7.8 MG/DL (ref 0.9–1.3)
EKG ATRIAL RATE: 101 BPM
EKG ATRIAL RATE: 83 BPM
EKG DIAGNOSIS: NORMAL
EKG DIAGNOSIS: NORMAL
EKG P AXIS: -16 DEGREES
EKG P-R INTERVAL: 172 MS
EKG Q-T INTERVAL: 306 MS
EKG Q-T INTERVAL: 400 MS
EKG QRS DURATION: 100 MS
EKG QRS DURATION: 104 MS
EKG QTC CALCULATION (BAZETT): 453 MS
EKG QTC CALCULATION (BAZETT): 470 MS
EKG R AXIS: -2 DEGREES
EKG R AXIS: -7 DEGREES
EKG T AXIS: 45 DEGREES
EKG T AXIS: 48 DEGREES
EKG VENTRICULAR RATE: 132 BPM
EKG VENTRICULAR RATE: 83 BPM
EOSINOPHILS ABSOLUTE: 0.3 K/UL (ref 0–0.6)
EOSINOPHILS RELATIVE PERCENT: 2.3 %
GFR AFRICAN AMERICAN: 9
GFR NON-AFRICAN AMERICAN: 7
GLUCOSE BLD-MCNC: 106 MG/DL (ref 70–99)
GLUCOSE BLD-MCNC: 106 MG/DL (ref 70–99)
GLUCOSE BLD-MCNC: 126 MG/DL (ref 70–99)
GLUCOSE BLD-MCNC: 136 MG/DL (ref 70–99)
GLUCOSE BLD-MCNC: 153 MG/DL (ref 70–99)
GLUCOSE BLD-MCNC: 167 MG/DL (ref 70–99)
HCT VFR BLD CALC: 25 % (ref 40.5–52.5)
HEMOGLOBIN: 7.9 G/DL (ref 13.5–17.5)
LYMPHOCYTES ABSOLUTE: 0.8 K/UL (ref 1–5.1)
LYMPHOCYTES RELATIVE PERCENT: 5.8 %
MCH RBC QN AUTO: 28.3 PG (ref 26–34)
MCHC RBC AUTO-ENTMCNC: 31.5 G/DL (ref 31–36)
MCV RBC AUTO: 89.9 FL (ref 80–100)
MONOCYTES ABSOLUTE: 1.1 K/UL (ref 0–1.3)
MONOCYTES RELATIVE PERCENT: 7.7 %
NEUTROPHILS ABSOLUTE: 11.8 K/UL (ref 1.7–7.7)
NEUTROPHILS RELATIVE PERCENT: 83.8 %
PDW BLD-RTO: 16.2 % (ref 12.4–15.4)
PERFORMED ON: ABNORMAL
PLATELET # BLD: 377 K/UL (ref 135–450)
PMV BLD AUTO: 8.3 FL (ref 5–10.5)
POTASSIUM REFLEX MAGNESIUM: 4.3 MMOL/L (ref 3.5–5.1)
RBC # BLD: 2.78 M/UL (ref 4.2–5.9)
SODIUM BLD-SCNC: 135 MMOL/L (ref 136–145)
TOTAL PROTEIN: 5.9 G/DL (ref 6.4–8.2)
WBC # BLD: 14 K/UL (ref 4–11)

## 2022-02-27 PROCEDURE — 6370000000 HC RX 637 (ALT 250 FOR IP): Performed by: NURSE PRACTITIONER

## 2022-02-27 PROCEDURE — 2580000003 HC RX 258: Performed by: STUDENT IN AN ORGANIZED HEALTH CARE EDUCATION/TRAINING PROGRAM

## 2022-02-27 PROCEDURE — 6370000000 HC RX 637 (ALT 250 FOR IP): Performed by: SURGERY

## 2022-02-27 PROCEDURE — 1200000000 HC SEMI PRIVATE

## 2022-02-27 PROCEDURE — 2700000000 HC OXYGEN THERAPY PER DAY

## 2022-02-27 PROCEDURE — 80053 COMPREHEN METABOLIC PANEL: CPT

## 2022-02-27 PROCEDURE — 93010 ELECTROCARDIOGRAM REPORT: CPT | Performed by: INTERNAL MEDICINE

## 2022-02-27 PROCEDURE — 93005 ELECTROCARDIOGRAM TRACING: CPT | Performed by: STUDENT IN AN ORGANIZED HEALTH CARE EDUCATION/TRAINING PROGRAM

## 2022-02-27 PROCEDURE — 94640 AIRWAY INHALATION TREATMENT: CPT

## 2022-02-27 PROCEDURE — 99233 SBSQ HOSP IP/OBS HIGH 50: CPT | Performed by: NURSE PRACTITIONER

## 2022-02-27 PROCEDURE — 94660 CPAP INITIATION&MGMT: CPT

## 2022-02-27 PROCEDURE — 2500000003 HC RX 250 WO HCPCS: Performed by: SURGERY

## 2022-02-27 PROCEDURE — 71045 X-RAY EXAM CHEST 1 VIEW: CPT

## 2022-02-27 PROCEDURE — 93005 ELECTROCARDIOGRAM TRACING: CPT | Performed by: PSYCHIATRY & NEUROLOGY

## 2022-02-27 PROCEDURE — 6360000002 HC RX W HCPCS: Performed by: STUDENT IN AN ORGANIZED HEALTH CARE EDUCATION/TRAINING PROGRAM

## 2022-02-27 PROCEDURE — 94761 N-INVAS EAR/PLS OXIMETRY MLT: CPT

## 2022-02-27 PROCEDURE — 36415 COLL VENOUS BLD VENIPUNCTURE: CPT

## 2022-02-27 PROCEDURE — 6370000000 HC RX 637 (ALT 250 FOR IP): Performed by: STUDENT IN AN ORGANIZED HEALTH CARE EDUCATION/TRAINING PROGRAM

## 2022-02-27 PROCEDURE — 2580000003 HC RX 258: Performed by: SURGERY

## 2022-02-27 PROCEDURE — 85025 COMPLETE CBC W/AUTO DIFF WBC: CPT

## 2022-02-27 PROCEDURE — 2500000003 HC RX 250 WO HCPCS: Performed by: NURSE PRACTITIONER

## 2022-02-27 RX ORDER — METOPROLOL SUCCINATE 50 MG/1
50 TABLET, EXTENDED RELEASE ORAL 2 TIMES DAILY
Status: DISCONTINUED | OUTPATIENT
Start: 2022-02-27 | End: 2022-03-03 | Stop reason: HOSPADM

## 2022-02-27 RX ORDER — PANTOPRAZOLE SODIUM 40 MG/1
40 TABLET, DELAYED RELEASE ORAL
Status: DISCONTINUED | OUTPATIENT
Start: 2022-02-27 | End: 2022-03-03 | Stop reason: HOSPADM

## 2022-02-27 RX ORDER — METOPROLOL TARTRATE 5 MG/5ML
2.5 INJECTION INTRAVENOUS ONCE
Status: COMPLETED | OUTPATIENT
Start: 2022-02-27 | End: 2022-02-27

## 2022-02-27 RX ORDER — METOPROLOL TARTRATE 5 MG/5ML
5 INJECTION INTRAVENOUS
Status: COMPLETED | OUTPATIENT
Start: 2022-02-27 | End: 2022-02-27

## 2022-02-27 RX ADMIN — INSULIN LISPRO 3 UNITS: 100 INJECTION, SOLUTION INTRAVENOUS; SUBCUTANEOUS at 18:14

## 2022-02-27 RX ADMIN — CLOPIDOGREL BISULFATE 75 MG: 75 TABLET ORAL at 07:44

## 2022-02-27 RX ADMIN — METOPROLOL TARTRATE 2.5 MG: 1 INJECTION, SOLUTION INTRAVENOUS at 03:51

## 2022-02-27 RX ADMIN — METOPROLOL TARTRATE 5 MG: 1 INJECTION, SOLUTION INTRAVENOUS at 06:37

## 2022-02-27 RX ADMIN — METOPROLOL SUCCINATE 50 MG: 50 TABLET, EXTENDED RELEASE ORAL at 21:34

## 2022-02-27 RX ADMIN — CALCIUM ACETATE 667 MG: 667 CAPSULE ORAL at 17:29

## 2022-02-27 RX ADMIN — SODIUM CHLORIDE, PRESERVATIVE FREE 10 ML: 5 INJECTION INTRAVENOUS at 07:44

## 2022-02-27 RX ADMIN — APIXABAN 2.5 MG: 2.5 TABLET, FILM COATED ORAL at 07:44

## 2022-02-27 RX ADMIN — APIXABAN 2.5 MG: 2.5 TABLET, FILM COATED ORAL at 21:35

## 2022-02-27 RX ADMIN — PRAVASTATIN SODIUM 40 MG: 40 TABLET ORAL at 07:44

## 2022-02-27 RX ADMIN — SODIUM CHLORIDE, PRESERVATIVE FREE 10 ML: 5 INJECTION INTRAVENOUS at 21:34

## 2022-02-27 RX ADMIN — CYCLOBENZAPRINE 5 MG: 10 TABLET, FILM COATED ORAL at 06:30

## 2022-02-27 RX ADMIN — ACETAMINOPHEN 650 MG: 325 TABLET ORAL at 21:35

## 2022-02-27 RX ADMIN — METOPROLOL SUCCINATE 50 MG: 50 TABLET, EXTENDED RELEASE ORAL at 09:56

## 2022-02-27 RX ADMIN — CALCIUM ACETATE 667 MG: 667 CAPSULE ORAL at 07:44

## 2022-02-27 RX ADMIN — QUETIAPINE FUMARATE 50 MG: 25 TABLET ORAL at 21:34

## 2022-02-27 RX ADMIN — TRAZODONE HYDROCHLORIDE 150 MG: 50 TABLET ORAL at 21:34

## 2022-02-27 RX ADMIN — CALCIUM ACETATE 667 MG: 667 CAPSULE ORAL at 11:16

## 2022-02-27 RX ADMIN — INSULIN LISPRO 3 UNITS: 100 INJECTION, SOLUTION INTRAVENOUS; SUBCUTANEOUS at 13:21

## 2022-02-27 RX ADMIN — TORSEMIDE 100 MG: 100 TABLET ORAL at 07:44

## 2022-02-27 RX ADMIN — OXYCODONE 10 MG: 5 TABLET ORAL at 23:10

## 2022-02-27 RX ADMIN — OXYCODONE 10 MG: 5 TABLET ORAL at 11:16

## 2022-02-27 RX ADMIN — OXYCODONE 10 MG: 5 TABLET ORAL at 17:29

## 2022-02-27 RX ADMIN — PANTOPRAZOLE SODIUM 40 MG: 40 TABLET, DELAYED RELEASE ORAL at 09:56

## 2022-02-27 RX ADMIN — TIOTROPIUM BROMIDE AND OLODATEROL 2 PUFF: 3.124; 2.736 SPRAY, METERED RESPIRATORY (INHALATION) at 08:39

## 2022-02-27 RX ADMIN — OXYCODONE 10 MG: 5 TABLET ORAL at 05:17

## 2022-02-27 RX ADMIN — CYCLOBENZAPRINE 5 MG: 10 TABLET, FILM COATED ORAL at 13:56

## 2022-02-27 RX ADMIN — DULOXETINE HYDROCHLORIDE 60 MG: 60 CAPSULE, DELAYED RELEASE ORAL at 07:44

## 2022-02-27 RX ADMIN — ACETAMINOPHEN 650 MG: 325 TABLET ORAL at 13:56

## 2022-02-27 RX ADMIN — ACETAMINOPHEN 650 MG: 325 TABLET ORAL at 05:16

## 2022-02-27 RX ADMIN — MEROPENEM 500 MG: 500 INJECTION, POWDER, FOR SOLUTION INTRAVENOUS at 09:58

## 2022-02-27 ASSESSMENT — PAIN DESCRIPTION - LOCATION
LOCATION: BACK

## 2022-02-27 ASSESSMENT — PAIN SCALES - GENERAL
PAINLEVEL_OUTOF10: 9
PAINLEVEL_OUTOF10: 7
PAINLEVEL_OUTOF10: 10
PAINLEVEL_OUTOF10: 0
PAINLEVEL_OUTOF10: 10
PAINLEVEL_OUTOF10: 9
PAINLEVEL_OUTOF10: 10
PAINLEVEL_OUTOF10: 9

## 2022-02-27 ASSESSMENT — PAIN DESCRIPTION - FREQUENCY: FREQUENCY: INTERMITTENT

## 2022-02-27 ASSESSMENT — PAIN DESCRIPTION - PAIN TYPE
TYPE: CHRONIC PAIN
TYPE: ACUTE PAIN

## 2022-02-27 ASSESSMENT — PAIN DESCRIPTION - DESCRIPTORS: DESCRIPTORS: ACHING

## 2022-02-27 ASSESSMENT — PAIN - FUNCTIONAL ASSESSMENT: PAIN_FUNCTIONAL_ASSESSMENT: PREVENTS OR INTERFERES SOME ACTIVE ACTIVITIES AND ADLS

## 2022-02-27 ASSESSMENT — PAIN DESCRIPTION - ORIENTATION: ORIENTATION: RIGHT;LEFT

## 2022-02-27 ASSESSMENT — PAIN DESCRIPTION - ONSET: ONSET: ON-GOING

## 2022-02-27 ASSESSMENT — PAIN DESCRIPTION - PROGRESSION: CLINICAL_PROGRESSION: NOT CHANGED

## 2022-02-27 NOTE — PLAN OF CARE
Problem: Falls - Risk of:  Goal: Will remain free from falls  Description: Will remain free from falls  Outcome: Ongoing     Problem: Pain:  Goal: Pain level will decrease  Description: Pain level will decrease  Outcome: Ongoing     Problem: Skin Integrity:  Goal: Will show no infection signs and symptoms  Description: Will show no infection signs and symptoms  Outcome: Ongoing     Problem: HEMODYNAMIC STATUS  Goal: Patient has stable vital signs and fluid balance  Outcome: Ongoing

## 2022-02-27 NOTE — PROGRESS NOTES
Hospitalist Progress Note      PCP: Froy Izaguirre MD    Date of Admission: 2/21/2022    Chief Complaint: perianal pain and dyspnea    Hospital Course: In brief this is a 46 yoM with ESRD and TIIDM presenting with perianal pain concerning for cellulitis and dyspnea likely from volume overload. Hospital course has been complicated by worsening encephalopathy concerning for hypoactive delirium with now acute /subacute thalamic infarct. Subjective: pt much more awake and alert. Able to interact in exam well. No concerns at this time. Pt recalls why he is admitted. Notes a new cough. No new weakness. Open to meeting with palliative care and wife to discuss [address].       Medications:  Reviewed    Infusion Medications    dextrose      sodium chloride Stopped (02/26/22 2200)     Scheduled Medications    apixaban  2.5 mg Oral BID    meropenem  500 mg IntraVENous Q24H    epoetin siddharth-epbx  10,000 Units IntraVENous Q MWF    acetaminophen  650 mg Oral 3 times per day    tiotropium-olodaterol  2 puff Inhalation Daily    calcium acetate  667 mg Oral TID WC    clopidogrel  75 mg Oral Daily    DULoxetine  60 mg Oral Daily    [Held by provider] gabapentin  100 mg Oral TID    [Held by provider] isosorbide mononitrate  30 mg Oral Daily    [Held by provider] lisinopril  10 mg Oral Daily    metoprolol succinate  50 mg Oral Daily    QUEtiapine  50 mg Oral Nightly    torsemide  100 mg Oral Daily    traZODone  150 mg Oral Nightly    linaclotide  145 mcg Oral QAM AC    pravastatin  40 mg Oral Daily    insulin lispro  0-18 Units SubCUTAneous TID WC    insulin lispro  0-9 Units SubCUTAneous Nightly    sodium chloride flush  5-40 mL IntraVENous 2 times per day    vancomycin (VANCOCIN) intermittent dosing (placeholder)   Other RX Placeholder     PRN Meds: heparin (porcine), haloperidol lactate, albuterol, prochlorperazine, oxyCODONE, fentanNYL, oxyCODONE, albuterol sulfate HFA, cyclobenzaprine, glucose, glucagon (rDNA), dextrose, sodium chloride flush, sodium chloride, dextrose bolus (hypoglycemia) **OR** dextrose bolus (hypoglycemia)      Intake/Output Summary (Last 24 hours) at 2/27/2022 0847  Last data filed at 2/26/2022 1859  Gross per 24 hour   Intake 640 ml   Output 2400 ml   Net -1760 ml       Physical Exam Performed:    /67   Pulse 80   Temp 98.3 °F (36.8 °C) (Axillary)   Resp 18   Ht 5' 9\" (1.753 m)   Wt 269 lb 10 oz (122.3 kg)   SpO2 100%   BMI 39.82 kg/m²     General appearance/pscyh: alert and oriented x3, follows commands, is comofrtable  HEENT: Pupils equal, round, and reactive to light. Conjunctivae/corneas clear. Neck: Supple, with full range of motion. No rigiditiy  Respiratory:  Normal respiratory effort. Cough during exam with some bibasilar crackles noted   Cardiovascular: Regular rate and rhythm with normal S1/S2 without murmurs, rubs or gallops. Abdomen: Soft, non-tender, non-distended with normal bowel sounds. Neurologic/MSK:  Non focal exam, full ROM, no cranial nerve deficit, strength equal 5/5 in all extremities. Capillary Refill: Brisk,3 seconds, normal   Peripheral Pulses: +2 palpable, equal bilaterally       Labs:   Recent Labs     02/25/22 0624 02/26/22  0653 02/27/22  0729   WBC 14.2* 13.4* 14.0*   HGB 7.8* 7.8* 7.9*   HCT 24.0* 23.7* 25.0*    373 377     Recent Labs     02/25/22 0624 02/26/22  0653 02/27/22  0729   * 132* 135*   K 4.9 5.3* 4.3   CL 93* 91* 93*   CO2 20* 17* 21   BUN 74* 82* 53*   CREATININE 9.5* 10.7* 7.8*   CALCIUM 8.8 8.4 8.6     Recent Labs     02/25/22 0624 02/26/22  0653 02/27/22  0729   AST 10* 9* 12*   ALT 9* 9* 7*   BILITOT <0.2 <0.2 <0.2   ALKPHOS 103 102 105     No results for input(s): INR in the last 72 hours. No results for input(s): Doreen Radha in the last 72 hours.     Urinalysis:      Lab Results   Component Value Date    NITRU Negative 02/22/2022    WBCUA 3-5 02/22/2022    BACTERIA Rare 02/22/2022    RBCUA 0-2 02/22/2022    BLOODU TRACE-INTACT 02/22/2022    SPECGRAV 1.015 02/22/2022    GLUCOSEU 250 02/22/2022       Radiology:  MRI BRAIN WO CONTRAST   Final Result   1. Acute/early subacute infarct involving the left thalamus and left parietal   lobe towards the vertex. 2. Cerebral parenchymal volume loss with chronic microvascular white matter   ischemic disease. 3. Chronic infarcts are noted in the periventricular white matter, right   thalamus, right layo, and bilateral cerebellar hemispheres. 4. Worsening bilateral mastoid effusions and paranasal sinus disease. CT HEAD WO CONTRAST   Preliminary Result   Mild cerebral atrophy. Remote lacunar infarcts in the basal ganglia and both   thalami. There is a new area of low attenuation in the mid lateral portion of the left   thalamus consistent with an infarct of indeterminate age. MRI is more   sensitive for evaluation of the brain parenchyma if indicated. CT CHEST PULMONARY EMBOLISM W CONTRAST   Final Result   There is a suboptimal contrast bolus, limiting evaluation of the segmental   and subsegmental branches. No central pulmonary embolism is detected. Patchy airspace disease in the lower lungs bilaterally, greater on the left,   most compatible with multifocal pneumonia or aspiration. Small left pleural   effusion has developed. Mildly enlarged mediastinal lymph nodes, similar compared to the previous   exam.  These are most likely reactive, but a follow-up chest CT in   approximately 3 months is recommended to ensure resolution. The small nodule seen previously within the left lower lobe is obscured on   the current examination. Please refer to the previous exam for   recommendations. XR CHEST PORTABLE   Final Result   No acute process. CT PELVIS WO CONTRAST Additional Contrast? None   Final Result   1.  Small 14 mm ovoid fluid collection in the subcutaneous fat of the right   perineum unchanged from at least 01/01/2021. Minimal adjacent subcutaneous   fat stranding. This may represent a sebaceous cyst with superimposed   infection not excluded. 2. No soft tissue gas or other drainable fluid collection. 3. No acute osseous abnormality. XR CHEST PORTABLE   Final Result   Vascular congestion. No consolidation. Stable cardiomegaly. Assessment/Plan:    Active Hospital Problems    Diagnosis     Acute on chronic combined systolic and diastolic heart failure (HCC) [I50.43]      Priority: High    Dyslipidemia [E78.5]      Priority: High    Type 2 diabetes, uncontrolled, with neuropathy (HCC) [E11.40, E11.65]      Priority: High    Coronary artery disease involving native coronary artery of native heart without angina pectoris [I25.10]      Priority: High    Essential hypertension [I10]      Priority: Medium    [place]-rectal abscess [K61.1]     Somnolence [R40.0]     Atrial flutter (HCC) [I48.92]     SIRS (systemic inflammatory response syndrome) (HCC) [R65.10]     ESRD on hemodialysis ([facility] [address] [N18.6, Z99.2]     Elevated troponin [R77.8]     Obesity (BMI 30-39. 9) [E66.9]     Sepsis without acute organ dysfunction (HCC) [A41.9]      Encephalopathy secondary to Acute/subacute thalamic CVA- encephalopathy improved.   -CT head and MRI with left thalamic and left parietal lobe, also noted chronic infarcts in periventricular white matter, right thalamus, layo and bilateral hemispheres  -neurology consulted as family would appreciate their input understanding it may not change mgmt, would have them weigh in on pts risk of hemorraghic conversion with increasing apixaban dose to 5mg (HASBLED score 6 but stroke is small and pt started on DOAC >24 hours after presumed incident)  - neuro check, NIHHS ordered, last known normal >24 hours ago  - continue Plavix and Apixaban, statin   - hold gabapentin    Perianal cellulitis w/ sepsis (SIRS criteira WBC 13.1, )  - CT pelvis revealed: small 14 mm ovoid fluid collection in subcutaneous fat of the right perineum unchanged from atleast 01/01/2021. Minimal adjacent subcutaneous fat stranding. This may represent a sebaceous cyst with superimposed infection. No soft tissue gas or other drainable fluid collection.  -surgical cultures: 1+ GPC cocci, Bld cx NGTD  - abcx can likely complete a 7 day course (end 02/28)  - pain regimen adjusted: acetaminophen scheduled, oxycodone PO for breakthrough pain and IV opioids if PO insufficient. Leukocytosis- can be reactive in setting of acute stroke versus ongoing infection. Only new focal finding is cough. Per nursing pt had loose stools today but not diarrhea. Pt did not want me to see perianal wound, per nursing it looks very well. Culture data NGTD. On adequate broad spectrum abcx. Hold course for now as pt is clinically improving.  - f/u CXR  - monitor BM     Acute hypoxic respiratory failure- likely volume overload from inadequate iHD based on initial CXR but sxs seem to overnight developed hypoxia (88%) on 2L NC. Initially presentation was dyspnea with out hypoxia. Suspect pt may have now aspirated. Ddimer acutely elevated > 2000 but CTA without PE. Covid rapid negative. - continue iHD with nephrology assistance. - overnight/naps CPAP  - wean oxygen as able    TIIDM- a1c 9.4 on 1/12/2022. Hypoglycemia noted. Holding basal and continuing SSI    Afib/aflutter with RVR-.  CHADSVASC 6.  - cardiology following  - now on DOAC    Essential HTN  -continue metoprolol; lisinopril on hold d/t hypotension recently but can likely restart if hemodynamics remain stable     HLD  -continue pravastatin    Elevated troponin, 0.15-->0.17 on admission  -no c/o chest pain  -likely 2/2 demand ischemia r/t ESRD  -trend troponin flat  -tele monitoring    Acute on chronic CHF, stable  -strict I&O  -daily weights (dry wt 119)  -continue demadex     Chronic normocytic anemia likely from anemia of chronic disease  -no s/s of bleeding at this time     Anxiety depression  -continue home meds  -mood stable at this time    ESRD iHD MWF  - nephrology following    Obesity  With Body mass index is 77.1 kg/m². Complicating assessment and treatment. Placing patient at risk for multiple co-morbidities as well as early death and contributing to the patient's presentation. Counseled on weight loss     DVT Prophylaxis: heparin ppx  Diet: ADULT DIET; Regular; 4 carb choices (60 gm/meal);  Low Sodium (2 gm) can advance diet post procedure  Code Status: Full Code    PT/OT Eval Status: not ordered    Family updated 02/26, see ACP note, pall care consulted hopefully family meeting 02/28    Dispo - pending clinical course, medically ready 02/28 or 02/29    Trish Garvey MD

## 2022-02-27 NOTE — PROGRESS NOTES
For patient safety, an AVASYS camera has been placed in patient's room. AVASYS monitoring needed for increased fall risk and pulling at lines. Writer placed call to monitor observer to verify camera number [id number] and review patient name, reason for monitoring, and contact information for primary RN. Patient notified of use of remote monitoring upon implementation of camera and verbalized understanding.

## 2022-02-27 NOTE — PROGRESS NOTES
[facility]   Daily Progress Note    Admit Date:  2/21/2022  HPI:    Chief Complaint   Patient presents with    Shortness of Breath     From dialysis. became very SOB at dialysis. Hx of COPD. No baseline O2.  97% room air on arrival. received very little dialysis prior to EMS arrival.       Jarocho Chavez presented with perianal pain from cellulitis as well as chest pain and shortness of breath. He underwent I&D of perineal abscess. In attempts to further diuresis with hemodialysis this has been difficult due to lying in patient's altered mental status. He was found to have new onset atrial fibrillation. Cardiology consulted    Hx of CAD s/p PCI to LAD in 2015, RCA 1/2022, AS s/p TAVR 2019, ICM, s/d CHF, HYPERTENSION, HLD, DM, PAD s/p PTA R iliac artery, ESRD on HD, chronic anemia, ZAINAB on CPAP, COPD, and hx of CVA. Subjective:  Mr. Weeks [place] awake and alert, oriented to person and place. Complains of back pain, denies chest pain. Denies palpitations. Some dyspnea but overall stable. Congested cough.     Objective:   Patient Vitals for the past 24 hrs:   BP Temp Temp src Pulse Resp SpO2 Weight   02/27/22 0740 119/67 98.3 °F (36.8 °C) Axillary 80 18 100 % --   02/27/22 0659 -- -- -- 81 -- -- --   02/27/22 0648 -- -- -- 117 -- -- --   02/27/22 0632 (!) 130/58 -- -- 135 -- -- --   02/27/22 0503 -- -- -- 119 -- -- --   02/27/22 0402 100/62 -- -- 117 -- -- --   02/27/22 0348 -- -- -- -- 14 -- --   02/27/22 0334 115/70 -- -- -- -- -- --   02/27/22 0326 123/78 97.6 °F (36.4 °C) Axillary 141 18 97 % --   02/27/22 0322 -- -- -- 141 -- 97 % --   02/27/22 0047 -- -- -- -- 18 -- --   02/26/22 2235 130/77 100 °F (37.8 °C) Axillary 90 15 97 % --   02/26/22 2044 -- -- -- -- 14 -- --   02/26/22 1930 138/67 99.4 °F (37.4 °C) Axillary 95 20 100 % --   02/26/22 1859 (!) 102/35 99.3 °F (37.4 °C) -- 92 20 -- 269 lb 10 oz (122.3 kg)   02/26/22 1627 88/64 98.5 °F (36.9 °C) -- 68 20 -- 274 lb 0.5 oz (124.3 kg) 02/26/22 1551 (!) 125/56 98.7 °F (37.1 °C) Axillary 94 19 98 % --   02/26/22 1117 (!) 89/59 97.6 °F (36.4 °C) Axillary 93 18 98 % --       Intake/Output Summary (Last 24 hours) at 2/27/2022 1037  Last data filed at 2/27/2022 0912  Gross per 24 hour   Intake 720 ml   Output 2400 ml   Net -1680 ml     Wt Readings from Last 3 Encounters:   02/26/22 269 lb 10 oz (122.3 kg)   02/10/22 257 lb (116.6 kg)   02/04/22 262 lb 5.6 oz (119 kg)         ASSESSMENT:   1. A. fib, new: Paroxysmal, converted to NSR at approximately 7 AM, anticoagulation with Eliquis  2. Altered mental status: MRI notable for new CVA (not hemorrhagic)  3. CAD: s/p recent PCI to RCA in January 2022, history of PCI to LAD, on aspirin, Plavix, statin and beta-blocker  4. Aortic stenosis due to bicuspid aortic valve, status post TAVR:  5. Ischemic cardiomyopathy: LVEF 40-45%  6. CHF, chronic combined: Fluid management per dialysis  7. ESRD on HD: Per nephrology  8. Diabetes mellitus: Poorly controlled  9. PAD      PLAN:  1. Continue Eliquis 2.5 mg p.o. twice daily for anticoagulation  2. Stop aspirin, continue Plavix  3. Lisinopril being held due to hypotension  4. Increase metoprolol to 50 mg twice daily  5. Neurology has been consulted  6.  Will follow      JAY Steele CNP, 2/27/2022, 10:37 AM  [facility]   [phone]       Telemetry: A. fib 90s  NYHA: IV    Physical Exam:  General:  Awake, alert, NAD   Skin:  Warm and dry  Neck:  JVP difficult to assess  Chest: Coarse throughout anteriorly  Cardiovascular:  RRR, normal S1S2, distant heart tones though no appreciable MRG  Abdomen:  Soft, nontender, +bowel sounds  Extremities: 1+ BLE edema      Medications:    pantoprazole  40 mg Oral QAM AC    apixaban  2.5 mg Oral BID    meropenem  500 mg IntraVENous Q24H    epoetin siddharth-epbx  10,000 Units IntraVENous Q MWF    acetaminophen  650 mg Oral 3 times per day    tiotropium-olodaterol  2 puff Inhalation Daily    calcium acetate 667 mg Oral TID     clopidogrel  75 mg Oral Daily    DULoxetine  60 mg Oral Daily    [Held by provider] gabapentin  100 mg Oral TID    [Held by provider] isosorbide mononitrate  30 mg Oral Daily    [Held by provider] lisinopril  10 mg Oral Daily    metoprolol succinate  50 mg Oral Daily    QUEtiapine  50 mg Oral Nightly    torsemide  100 mg Oral Daily    traZODone  150 mg Oral Nightly    linaclotide  145 mcg Oral QAM AC    pravastatin  40 mg Oral Daily    insulin lispro  0-18 Units SubCUTAneous TID WC    insulin lispro  0-9 Units SubCUTAneous Nightly    sodium chloride flush  5-40 mL IntraVENous 2 times per day    vancomycin (VANCOCIN) intermittent dosing (placeholder)   Other RX Placeholder      dextrose      sodium chloride Stopped (02/26/22 2200)       Lab Data: Lab results independently reviewed and analyzed by myself 2/27/2022    CBC:   Recent Labs     02/25/22  0624 02/26/22  0653 02/27/22  0729   WBC 14.2* 13.4* 14.0*   HGB 7.8* 7.8* 7.9*    373 377     BMP:    Recent Labs     02/25/22  0624 02/26/22  0653 02/27/22  0729   * 132* 135*   K 4.9 5.3* 4.3   CO2 20* 17* 21   BUN 74* 82* 53*   CREATININE 9.5* 10.7* 7.8*     INR:  No results for input(s): INR in the last 72 hours. BNP:    No results for input(s): PROBNP in the last 72 hours. Cardiac Enzymes: No results for input(s): TROPONINI in the last 72 hours. Lipids:   Lab Results   Component Value Date    TRIG 214 11/30/2021    TRIG 187 10/12/2021    HDL 53 11/30/2021    HDL 43 10/12/2021    LDLCALC 155 11/30/2021    LDLCALC 128 10/12/2021    LDLDIRECT 199 09/04/2014    LDLDIRECT 172 03/07/2014       Cardiac Imaging:    Coronary angiogram 1/14/2022:  1. Left heart catheterization  2. Selective left and right coronary angiogram  3. PCI of the RCA with PATRICIA  Procedure Findings:  1. 99% mid RCA  2. 60-70% CIRC and DIAG  3.  Elevated left heart hemodynamics              ~LVEDP 30       Details:              Renetta Sibley was brought to the cardiac catheterization lab in a fasting state after informed consent was obtained. If the patient was able to provide written consent, it was obtained. The patient's vitals were monitored through out the procedure. The patient was sedated using the appropriate levels of sedation and ASA guidelines. The appropriate access site area was prepped and drapped in a sterile fashion. The area was anesthetized with 2% lidocaine. Using the modified Seldinger technique, an arterial sheath was introduced into the arterial access site using a single anterior wall puncture. The sheath was flushed and prepped in usual fashion. Catheters used during the procedure included 5 [place] TIG 4.0 catheter. The catheters were advanced and removed over a .035\" wire, into the appropriate positions. Multiple angiographic views were obtained. An LV gram was not obtained. ~The interventional portion of the procedure was completed utilizing a multipurpose 6 [place] guide. Multipurpose guide was advanced into the right coronary ostium. A gentleman therapy aspirin, Plavix, Angiomax was initiated. A BMW wire was advanced into the distal right coronary artery. The lesion was initially predilated with a 2.75 x 15 mm Euphora balloon followed by a 3.5 mm x 20 mm Euphora balloon and subsequently stented with a resolute [place] 4.0 mm x 38 mm balloon. We did experience a no reflow phenomenon. We separately done. A twin pass catheter and then did local drug delivery with 200 mcg of nitroprusside and 200 mcgnitroglycerin. Provided restoration of SCARLETT-3 flow. Findings:  1. Left main coronary artery was normal. It gave off the left anterior descending artery and left circumflex. 2. Left anterior descending artery has 60% severe atherosclerotic disease. It was moderate in size. It gave off septal perforators and a moderate sized diagonal branch.  The LAD covered the entire apex of the left ventricle. 3. Left circumflex has 60% severe atherosclerotic disease. It was moderate in size. There was a moderate sized obtuse marginal branch. 4. Right coronary artery has 99% severe atherosclerotic disease. It was moderate in size and was the dominant artery. CONCLUSIONS:  1. Apercutaneous coronary intervention of the right coronary artery utilizing a single resolute [place] drug-eluting stent  ASSESSMENT/RECOMMENDATIONS:  1. Dual antiplatelet therapy  2. Medical management of the remaining coronary disease     Echo 1/2022:   Limited only f/u for LVEF and RVF. The left ventricular systolic function is mildly reduced with an ejection   fraction of 40-45 %. There is hypokinesis of the apex and inferior walls. There is a very small circumferential pericardial effusion noted. No tamponade physiology. The right ventricle is normal in size and function. Echo 7/9/2020:  Low Normal systolic function with an estimated ejection fraction of 50%. Left ventricular function and wall motion is difficult to estimate due to   poor endocardial visualization. Grade I diastolic dysfunction with normal filing pressure. Normally functioning TAVR 29 mm Langford Leyda S3 bioprosthetic valve in   aortic position appears well seated with a max velocity of 2.11 m/sec,   maximum gradient of 18 mmHg and a mean gradient of 8 mmHg. There is no evidence of aortic regurgitation  Coronary angiogram 8/22/2019:  LM <20%  LAD patent stent  Cx <20%  RCA 30%  Severe AS by echo  Proceed w/ TAVR  Echo 1/24/2017:  Summary   Left ventricular systolic function is normal with an ejection fraction of   55-60%. No wall motion abnormalities noted. Mild concentric left ventricular hypertrophy. Grade II diastolic dysfunction with elevated filling pressure. Mildly dilated left atrium. Moderate aortic stenosis. Mild aortic regurgitation.    Compared to previous study from 10/27/2016, aortic stenosis has not progressed. R/LHC 1/23/2017:  LM <20%  LAD 30% w/ patent stent  Cx 20-30%  RCA 20-30%  LVEDP 19  RA 8, RV 41/10, PA 45/14/31, W 15      Nonobstructive CAD  Mild elevated right heart pressures      Echo Oct 2016:   Normal left ventricle size with mild concentric left ventricular hypertrophy.   Normal systolic function with an estimated ejection fraction of 55%.   No regional wall motion abnormalities are seen.   Elevated left ventricular diastolic filling pressure ( E/e' 21 ).  The aortic valve is thickened/calcified with decreased leaflet mobility. Cannot exclude a bicuspid valve.  The aortic valve area is calculated at 1.0 cm2 with a max velocity of 3.36 m/sec, maximum pressure gradient of 45 mmHg and a mean pressure gradient of 23 mmHg. This is c/w moderate aortic stenosis. Color flow demonstrates eccentric jet suggesting mild aortic regurgitation.   Trace mitral and tricuspid regurgitation. Systolic pulmonary artery pressure (SPAP) is normal and estimated at 22 mmHg (RA pressure 3 mmHg).   There is mild concentric left ventricular hypertrophy. MPI 6/18/15: There is a very small and mild fixed posterior-basal defect consistent with  fibrosis. There is no evidence for ischemia. Left ventricular function is reduced with and estimated LVEF of 40%. The LV is severely dilated. CATH: 5/26/15, Dr. Tiffanie Lane  LM <20%   LAD 70% mid. FFR 0.77  D1 50% prox  Cx 20%  RCA 20%  LVEDP 22mmHg  RA 9, RV 42/10, PA 44/16/31, W 18  PA sat 63%    PTCA LAD  3.0 x 15 PATRICIA  prox portion post 3.5 x 8 NC balloon    DAPT, statin  Resume lasix and ARB at discharge provided Cr stable  Renal fxn will not tolerate higher dose of lasix than 40 daily  Needs smoking cessation, weight loss, exercise  Rec OP sleep eval       Echo 3/26/15:   Left ventricle size is normal.  Mild c LVH. EF 55 % to 60 %. No regional wall motion abnormalities are noted. Normal diastolic filling pattern for age.   Mild mitral regurgitation is present. Mildly dilated left atrium by increased left atrial volume. Aortic valve is mildly thickened and calcified. The aortic valve area is calculated at 1.4 cm2 with a maximum gradient of 32 mmHg and a mean gradient of 18 mmHg. This is c/w mild aortic stenosis. Trace aortic regurgitation. Trivial tricuspid regurgitation. Systolic pulmonary artery pressure (SPAP) is normal and estimated at 14 mmHg (RA pressure 3 mm Hg).

## 2022-02-27 NOTE — PROGRESS NOTES
Progress Note    HISTORY     CC:  Pain            We are following for ESRD         Subjective/   HPI:  Still sleepy but seems to wax and wane. HD yesterday and got 2 kg UF. Post weight of 122 kg. Patient open to discussing with palliative care     ROS:  Constitutional:  no fevers, No Chills, + weakness  Cardiovascular:  No palpations, no edema  Respiratory:  No wheezing, no cough  :  No hematuria, not making much urine     Social Hx:  No Family at the bedside     Past Medical and Surgical History:  - Reviewed, no changes     EXAM       Objective/     Vitals:    02/27/22 0648 02/27/22 0659 02/27/22 0740 02/27/22 1113   BP:   119/67 133/74   Pulse: 117 81 80 86   Resp:   18 18   Temp:   98.3 °F (36.8 °C) 98 °F (36.7 °C)   TempSrc:   Axillary Oral   SpO2:   100% 99%   Weight:       Height:         24HR INTAKE/OUTPUT:      Intake/Output Summary (Last 24 hours) at 2/27/2022 1330  Last data filed at 2/27/2022 0912  Gross per 24 hour   Intake 720 ml   Output 2400 ml   Net -1680 ml     Constitutional:  Ill appearing, NAD  Eyes:  Pupils reactive, sclera clear   Neck:  Normal thyroid, no masses   Cardiovascular:  Regular, no rub  Respiratory:  No distress, no wheezing  Psychiatry:  Flat mood/affect, somnolent   Abdomen: +bs, soft, nt, no masses   Musculoskeletal: No LE edema, no clubbing   Lymphatics:  No LAD in neck, no supraclavicular nodes   Access:  Left [facility]       MEDICAL DECISION MAKING       Data/  Recent Labs     02/25/22  0624 02/26/22  0653 02/27/22  0729   WBC 14.2* 13.4* 14.0*   HGB 7.8* 7.8* 7.9*   HCT 24.0* 23.7* 25.0*   MCV 90.5 89.9 89.9    373 377     Recent Labs     02/25/22  0624 02/26/22  0653 02/27/22  0729   * 132* 135*   K 4.9 5.3* 4.3   CL 93* 91* 93*   CO2 20* 17* 21   GLUCOSE 58* 96 106*   BUN 74* 82* 53*   CREATININE 9.5* 10.7* 7.8*   LABGLOM 6* 5* 7*   GFRAA 7* 6* 9*       Assessment/     ESRD:  On dialysis MWF at [facility]. Failed PD. Not transplant candidate.   Had fistula ligated due to dialysis associated steal.  He has not reached his target of 116 kg in over 1 months     Anemia of chronic disease-CKD:  - Hb is below target, on DAVON at the dialysis unit     Hyponatremia:  Due to volume overload in setting of ESRD    Hyperkalemia:  Due to ESRD, had dialysis today       Perineal Pain:  Surgery following and s/p I&D. On broad spectrum IV antibiotics and pain medications    Plan/     Dialysis tomorrow, UF as tolerated  Reasonable to have palliative care see him. In the past patient thought about stopping dialysis but he struggles with how that is viewed. He has considerable disease burden and has been having a hard and harder time with dialysis.        -----------------------------  Sergio Mckeon M.D.   [facility]

## 2022-02-27 NOTE — FLOWSHEET NOTE
02/26/22 1627 02/26/22 1859   Vital Signs   BP 88/64 (!) 102/35   Temp 98.5 °F (36.9 °C) 99.3 °F (37.4 °C)   Pulse 68 92   Resp 20 20     Treatment time: 2.5 hours    Net UF: 2 L    Pre weight: 124.3 kg (bed scale)  Post weight: 122.3 kg (bed scale)  EDW: 116.4 kg    Access used: RIJ HD cath  Access function: No problems, Qb 300. Medications or blood products given: None    Regular outpatient schedule: [address] [place] [address]    Summary of response to treatment: Tolerated tx without difficulty. Copy of dialysis treatment record placed in chart, to be scanned into EMR. Report called to Mariajose Gaston RN.

## 2022-02-27 NOTE — PLAN OF CARE
Problem: COMMUNICATION IMPAIRMENT  Goal: Ability to express needs and understand communication  2/27/2022 1343 by Michael Winston RN  Outcome: Pt is alert and oriented speaks clearly.

## 2022-02-27 NOTE — PROGRESS NOTES
02/26/22 2044   Settings/Measurements   IPAP 16 cmH20   CPAP/EPAP 10 cmH2O   Rate Ordered 12   Resp 14   FiO2  30 %   Vt Exhaled 866 mL   Minute Volume 12.3 Liters   Mask Leak (lpm) 7 lpm   Comfort Level Good   Using Accessory Muscles No   SpO2 96   Alarm Settings   Alarms On Y   Press Low Alarm 6 cmH2O   High Pressure Alarm 30 cmH2O   Resp Rate Low Alarm 6   High Respiratory Rate 40 br/min

## 2022-02-27 NOTE — PROGRESS NOTES
46: [address] called stating pt's HR in to 140's. Pt in bed resting , eyes closed. Manual /70. STAT EKG ordered per protocol. 0330: Perfect serve on call NP.    0345: Jessie Herron at bedside.     6001: 2.5 mg IV Metoprolol given once per Mariana Younger NP.     0353:NP added 5 mg IVP Metoprolol one time dose if HR sustaining > 130 bpm.     0402: /62, 's-120's.     0630: 's-140's, 5 mg IVP Metoprolol given

## 2022-02-27 NOTE — PROGRESS NOTES
Tele shows NSR, ordered EKG to confirm. Pt more alert today oriented to self,situation and he knows the year. Calm cooperative.

## 2022-02-27 NOTE — PROGRESS NOTES
02/27/22 0348   NIV Type   Skin Protection for O2 Device No  (pt refused mepilex)   NIV Started/Stopped On   Equipment Type v60   Mode Bilevel   Mask Type Full face mask   Mask Size Medium   Settings/Measurements   IPAP 16 cmH20   CPAP/EPAP 10 cmH2O   Rate Ordered 12   Resp 14   FiO2  30 %   Vt Exhaled 662 mL   Minute Volume 9.5 Liters   Comfort Level Good   Using Accessory Muscles No   SpO2 94   Alarm Settings   Alarms On Y   Press Low Alarm 6 cmH2O   High Pressure Alarm 30 cmH2O   Apnea (secs) 20 secs   Resp Rate Low Alarm 6   High Respiratory Rate 40 br/min

## 2022-02-27 NOTE — PROGRESS NOTES
02/27/22 0047   NIV Type   $NIV $Daily Charge   Equipment Type v60   Mode Bilevel   Mask Type Full face mask   Mask Size Medium   Settings/Measurements   IPAP 16 cmH20   CPAP/EPAP 10 cmH2O   Rate Ordered 12   Resp 18   FiO2  30 %   Vt Exhaled 629 mL   Minute Volume 10.9 Liters   Comfort Level Good   Using Accessory Muscles No   SpO2 97   Alarm Settings   Alarms On Y   Press Low Alarm 6 cmH2O   High Pressure Alarm 30 cmH2O   Apnea (secs) 20 secs   Resp Rate Low Alarm 6   High Respiratory Rate 40 br/min

## 2022-02-28 LAB
A/G RATIO: 1.1 (ref 1.1–2.2)
ALBUMIN SERPL-MCNC: 3.1 G/DL (ref 3.4–5)
ALP BLD-CCNC: 102 U/L (ref 40–129)
ALT SERPL-CCNC: 6 U/L (ref 10–40)
ANAEROBIC CULTURE: ABNORMAL
ANION GAP SERPL CALCULATED.3IONS-SCNC: 19 MMOL/L (ref 3–16)
AST SERPL-CCNC: 7 U/L (ref 15–37)
BANDED NEUTROPHILS RELATIVE PERCENT: 8 % (ref 0–7)
BASOPHILS ABSOLUTE: 0.1 K/UL (ref 0–0.2)
BASOPHILS RELATIVE PERCENT: 1 %
BILIRUB SERPL-MCNC: <0.2 MG/DL (ref 0–1)
BUN BLDV-MCNC: 66 MG/DL (ref 7–20)
CALCIUM SERPL-MCNC: 8.7 MG/DL (ref 8.3–10.6)
CHLORIDE BLD-SCNC: 90 MMOL/L (ref 99–110)
CO2: 22 MMOL/L (ref 21–32)
CREAT SERPL-MCNC: 8.8 MG/DL (ref 0.9–1.3)
CULTURE SURGICAL: ABNORMAL
CULTURE SURGICAL: ABNORMAL
EOSINOPHILS ABSOLUTE: 0.6 K/UL (ref 0–0.6)
EOSINOPHILS RELATIVE PERCENT: 4 %
GFR AFRICAN AMERICAN: 8
GFR NON-AFRICAN AMERICAN: 6
GLUCOSE BLD-MCNC: 115 MG/DL (ref 70–99)
GLUCOSE BLD-MCNC: 139 MG/DL (ref 70–99)
GLUCOSE BLD-MCNC: 148 MG/DL (ref 70–99)
GLUCOSE BLD-MCNC: 151 MG/DL (ref 70–99)
GLUCOSE BLD-MCNC: 176 MG/DL (ref 70–99)
GRAM STAIN RESULT: ABNORMAL
HCT VFR BLD CALC: 25.1 % (ref 40.5–52.5)
HEMOGLOBIN: 8.2 G/DL (ref 13.5–17.5)
LYMPHOCYTES ABSOLUTE: 1.5 K/UL (ref 1–5.1)
LYMPHOCYTES RELATIVE PERCENT: 10 %
MCH RBC QN AUTO: 29.2 PG (ref 26–34)
MCHC RBC AUTO-ENTMCNC: 32.8 G/DL (ref 31–36)
MCV RBC AUTO: 88.9 FL (ref 80–100)
MONOCYTES ABSOLUTE: 0.7 K/UL (ref 0–1.3)
MONOCYTES RELATIVE PERCENT: 5 %
NEUTROPHILS ABSOLUTE: 11.6 K/UL (ref 1.7–7.7)
NEUTROPHILS RELATIVE PERCENT: 72 %
ORGANISM: ABNORMAL
PDW BLD-RTO: 16.1 % (ref 12.4–15.4)
PERFORMED ON: ABNORMAL
PLATELET # BLD: 435 K/UL (ref 135–450)
PLATELET SLIDE REVIEW: ADEQUATE
PMV BLD AUTO: 8.3 FL (ref 5–10.5)
POLYCHROMASIA: ABNORMAL
POTASSIUM REFLEX MAGNESIUM: 4.8 MMOL/L (ref 3.5–5.1)
RBC # BLD: 2.82 M/UL (ref 4.2–5.9)
SLIDE REVIEW: ABNORMAL
SODIUM BLD-SCNC: 131 MMOL/L (ref 136–145)
STOMATOCYTES: ABNORMAL
TOTAL PROTEIN: 6 G/DL (ref 6.4–8.2)
VANCOMYCIN RANDOM: 18.1 UG/ML
WBC # BLD: 14.5 K/UL (ref 4–11)

## 2022-02-28 PROCEDURE — 6370000000 HC RX 637 (ALT 250 FOR IP): Performed by: NURSE PRACTITIONER

## 2022-02-28 PROCEDURE — 6370000000 HC RX 637 (ALT 250 FOR IP): Performed by: SURGERY

## 2022-02-28 PROCEDURE — 80202 ASSAY OF VANCOMYCIN: CPT

## 2022-02-28 PROCEDURE — 90935 HEMODIALYSIS ONE EVALUATION: CPT

## 2022-02-28 PROCEDURE — 99232 SBSQ HOSP IP/OBS MODERATE 35: CPT | Performed by: NURSE PRACTITIONER

## 2022-02-28 PROCEDURE — 85025 COMPLETE CBC W/AUTO DIFF WBC: CPT

## 2022-02-28 PROCEDURE — 36415 COLL VENOUS BLD VENIPUNCTURE: CPT

## 2022-02-28 PROCEDURE — 94660 CPAP INITIATION&MGMT: CPT

## 2022-02-28 PROCEDURE — 6360000002 HC RX W HCPCS: Performed by: INTERNAL MEDICINE

## 2022-02-28 PROCEDURE — 6370000000 HC RX 637 (ALT 250 FOR IP): Performed by: STUDENT IN AN ORGANIZED HEALTH CARE EDUCATION/TRAINING PROGRAM

## 2022-02-28 PROCEDURE — 2580000003 HC RX 258: Performed by: SURGERY

## 2022-02-28 PROCEDURE — 2580000003 HC RX 258: Performed by: INTERNAL MEDICINE

## 2022-02-28 PROCEDURE — 2580000003 HC RX 258: Performed by: STUDENT IN AN ORGANIZED HEALTH CARE EDUCATION/TRAINING PROGRAM

## 2022-02-28 PROCEDURE — 80053 COMPREHEN METABOLIC PANEL: CPT

## 2022-02-28 PROCEDURE — 2700000000 HC OXYGEN THERAPY PER DAY

## 2022-02-28 PROCEDURE — 94761 N-INVAS EAR/PLS OXIMETRY MLT: CPT

## 2022-02-28 PROCEDURE — 1200000000 HC SEMI PRIVATE

## 2022-02-28 PROCEDURE — 6360000002 HC RX W HCPCS: Performed by: STUDENT IN AN ORGANIZED HEALTH CARE EDUCATION/TRAINING PROGRAM

## 2022-02-28 PROCEDURE — 6360000002 HC RX W HCPCS

## 2022-02-28 PROCEDURE — 99223 1ST HOSP IP/OBS HIGH 75: CPT | Performed by: PSYCHIATRY & NEUROLOGY

## 2022-02-28 PROCEDURE — 94640 AIRWAY INHALATION TREATMENT: CPT

## 2022-02-28 RX ORDER — HEPARIN SODIUM 1000 [USP'U]/ML
INJECTION, SOLUTION INTRAVENOUS; SUBCUTANEOUS
Status: COMPLETED
Start: 2022-02-28 | End: 2022-02-28

## 2022-02-28 RX ORDER — OXYCODONE HYDROCHLORIDE 5 MG/1
10 TABLET ORAL EVERY 4 HOURS PRN
Status: DISCONTINUED | OUTPATIENT
Start: 2022-02-28 | End: 2022-03-03 | Stop reason: HOSPADM

## 2022-02-28 RX ADMIN — OXYCODONE 10 MG: 5 TABLET ORAL at 03:59

## 2022-02-28 RX ADMIN — PANTOPRAZOLE SODIUM 40 MG: 40 TABLET, DELAYED RELEASE ORAL at 05:43

## 2022-02-28 RX ADMIN — ACETAMINOPHEN 650 MG: 325 TABLET ORAL at 14:59

## 2022-02-28 RX ADMIN — TRAZODONE HYDROCHLORIDE 150 MG: 50 TABLET ORAL at 20:38

## 2022-02-28 RX ADMIN — INSULIN LISPRO 3 UNITS: 100 INJECTION, SOLUTION INTRAVENOUS; SUBCUTANEOUS at 18:30

## 2022-02-28 RX ADMIN — MEROPENEM 500 MG: 500 INJECTION, POWDER, FOR SOLUTION INTRAVENOUS at 14:59

## 2022-02-28 RX ADMIN — EPOETIN ALFA-EPBX 10000 UNITS: 10000 INJECTION, SOLUTION INTRAVENOUS; SUBCUTANEOUS at 12:52

## 2022-02-28 RX ADMIN — SODIUM CHLORIDE 25 ML: 9 INJECTION, SOLUTION INTRAVENOUS at 17:48

## 2022-02-28 RX ADMIN — HEPARIN SODIUM 4700 UNITS: 1000 INJECTION INTRAVENOUS; SUBCUTANEOUS at 12:53

## 2022-02-28 RX ADMIN — HEPARIN SODIUM 4700 UNITS: 1000 INJECTION, SOLUTION INTRAVENOUS; SUBCUTANEOUS at 12:53

## 2022-02-28 RX ADMIN — CYCLOBENZAPRINE 5 MG: 10 TABLET, FILM COATED ORAL at 02:38

## 2022-02-28 RX ADMIN — SODIUM CHLORIDE 25 ML: 9 INJECTION, SOLUTION INTRAVENOUS at 14:56

## 2022-02-28 RX ADMIN — SODIUM CHLORIDE 25 ML: 9 INJECTION, SOLUTION INTRAVENOUS at 17:00

## 2022-02-28 RX ADMIN — ACETAMINOPHEN 650 MG: 325 TABLET ORAL at 23:02

## 2022-02-28 RX ADMIN — APIXABAN 2.5 MG: 2.5 TABLET, FILM COATED ORAL at 20:38

## 2022-02-28 RX ADMIN — QUETIAPINE FUMARATE 50 MG: 25 TABLET ORAL at 20:38

## 2022-02-28 RX ADMIN — METOPROLOL SUCCINATE 50 MG: 50 TABLET, EXTENDED RELEASE ORAL at 20:38

## 2022-02-28 RX ADMIN — VANCOMYCIN HYDROCHLORIDE 750 MG: 750 INJECTION, POWDER, LYOPHILIZED, FOR SOLUTION INTRAVENOUS at 17:52

## 2022-02-28 RX ADMIN — ACETAMINOPHEN 650 MG: 325 TABLET ORAL at 05:43

## 2022-02-28 RX ADMIN — SODIUM CHLORIDE, PRESERVATIVE FREE 10 ML: 5 INJECTION INTRAVENOUS at 20:39

## 2022-02-28 RX ADMIN — TIOTROPIUM BROMIDE AND OLODATEROL 2 PUFF: 3.124; 2.736 SPRAY, METERED RESPIRATORY (INHALATION) at 08:03

## 2022-02-28 RX ADMIN — OXYCODONE 10 MG: 5 TABLET ORAL at 20:38

## 2022-02-28 ASSESSMENT — PAIN SCALES - GENERAL
PAINLEVEL_OUTOF10: 9
PAINLEVEL_OUTOF10: 8
PAINLEVEL_OUTOF10: 10
PAINLEVEL_OUTOF10: 8
PAINLEVEL_OUTOF10: 7
PAINLEVEL_OUTOF10: 0
PAINLEVEL_OUTOF10: 8

## 2022-02-28 ASSESSMENT — PAIN DESCRIPTION - LOCATION: LOCATION: BACK

## 2022-02-28 ASSESSMENT — PAIN DESCRIPTION - PAIN TYPE: TYPE: ACUTE PAIN

## 2022-02-28 ASSESSMENT — PAIN DESCRIPTION - ORIENTATION: ORIENTATION: LOWER

## 2022-02-28 ASSESSMENT — PAIN DESCRIPTION - DESCRIPTORS: DESCRIPTORS: STABBING

## 2022-02-28 NOTE — PROGRESS NOTES
Pt resting in bed. Vss. Wore BIPAP for about 3 hours and then wanted it off. C/o pain to back, PRN pain medication given with some effectiveness per pt. Plan for dialysis tomorrow. No s/s of distress at this time.

## 2022-02-28 NOTE — PROGRESS NOTES
Comprehensive Nutrition Assessment    Type and Reason for Visit:  RD Nutrition Re-Screen/LOS    Nutrition Recommendations/Plan:   1. Continue carb controlled, low sodium diet  2. RD to order Glucerna BID  3. Encourage PO intakes  4. Monitor need for further CHF nutrition therapy  5. Monitor nutrition adequacy, pertinent labs, bowel habits, wt changes, and clinical progress    Nutrition Assessment:  LOS: Pt presented to [facility] with SOB. PMH ESRD, CHF, CAD, CVA, COPD, DM, HLD, HTN. S/p I&D to perineum 2/24. Pt does HD 3x weekly. Could not get a hold of pt after multiple attempts. Pt currently on carb controlled, low sodium diet with variable intakes of 0-100%. RD to order Glucerna BID to better meet nutrient recommendations. Weights remain variable most likely d/t HD. Pt previously seen for CHF education, will monitor for questions or further education needs. Will continue to monitor. Malnutrition Assessment:  Malnutrition Status: At risk for malnutrition (Comment)    Context:  Acute Illness     Findings of the 6 clinical characteristics of malnutrition:  Energy Intake:  Mild decrease in energy intake (Comment)  Fluid Accumulation:  1 - Mild      Estimated Daily Nutrient Needs:  Energy (kcal):  8030-9541; Weight Used for Energy Requirements:  Ideal (72 kg)     Protein (g):  72-86; Weight Used for Protein Requirements:  Ideal (1-1.2)        Method Used for Fluid Requirements:  1 ml/kcal      Nutrition Related Findings:  BUN 66, Cr 8.8, GFR 6. -176 mg/dL x24 hrs. +BM 2/28. Non-pitting generalized edema +1 BUE, +2 BLE. Wounds:  Surgical Incision       Current Nutrition Therapies:    ADULT DIET; Regular; 4 carb choices (60 gm/meal);  Low Sodium (2 gm)    Anthropometric Measures:  · Height: 5' 9\" (175.3 cm)  · Current Body Weight: 247 lb 5.7 oz (112.2 kg)   · Admission Body Weight: 266 lb 12.1 oz (121 kg)     · Ideal Body Weight: 160 lbs; % Ideal Body Weight 154.6 %   · BMI: 36.5  · BMI Categories: Obese Class 2 (BMI 35.0 -39.9)       Nutrition Diagnosis:   · Inadequate oral intake related to inadequate protein-energy intake as evidenced by intake 0-25%,intake 26-50%    Nutrition Interventions:   Food and/or Nutrient Delivery:  Continue Current Diet,Start Oral Nutrition Supplement  Nutrition Education/Counseling:  Education initiated   Coordination of Nutrition Care:  Continue to monitor while inpatient    Goals:  Pt will have PO intakes of 50% or greater during stay.        Nutrition Monitoring and Evaluation:   Behavioral-Environmental Outcomes:  None Identified   Food/Nutrient Intake Outcomes:  Food and Nutrient Intake  Physical Signs/Symptoms Outcomes:  Nutrition Focused Physical Findings     Discharge Planning:    Continue current diet     Electronically signed by Gloria Banks on 2/28/22 at 2:07 PM EST    Contact: [phone]

## 2022-02-28 NOTE — PROGRESS NOTES
Treatment time: 210min    Net UF: 1600ml    Pre weight: 114.3k  Post weight: 112.2  EDW: TBD    Access used: LIJ TDC  Access function: Positional, reversed    Medications or blood products given: Retacrit [id number]    Regular outpatient schedule: MWF    Summary of response to treatment: Good    Copy of dialysis treatment record placed in chart, to be scanned into EMR.

## 2022-02-28 NOTE — CONSULTS
In patient Neurology consult        [facility] Neurology      MD Fede Weaver  1970    Date of Service: 2022    Referring Physician: Arianna Tracy MD      Reason for the consult and CC: Acute CVA    HPI:   The patient is a 46 y.o.  male, with a PMH of ESRD on HD, HTN, CAD, PAF on Eliquis, HLD, DM2, and CVA, who presented to [facility] on 22 with SOB and perineal cellulitis. His hospital course was complicated by worsening encephalopathy. A MRI of the brain was obtained revealing an acute/subacute small left thalamic and parietal lobe infarcts. At the time of my exam, he is awake and alert, but is confused. He denies fever, chills, headache, dizziness, vision changes, dysarthria, dysphagia, tinnitus, focal weakness.            Family History   Problem Relation Age of Onset    Diabetes Mother     Heart Disease Father     Kidney Disease Sister         stage 4-kidney failure    Cancer Sister     Heart Disease Sister     Obesity Sister     Cancer Sister     Heart Disease Sister     Obesity Sister     Alcohol Abuse Brother      Past Surgical History:   Procedure Laterality Date    AORTIC VALVE REPLACEMENT N/A 10/15/2019    TRANSCATHETER AORTIC VALVE REPLACEMENT FEMORAL APPROACH performed by Ross Davis MD at [address] [place] [address] Ave Right 2019    PERITONEAL DIALYSIS CATHETER REMOVAL performed by Girish Camargo MD at [facility]      [facility]    CORONARY ANGIOPLASTY WITH STENT PLACEMENT  05/26/15    CYST REMOVAL  2013    EXCISION CYSTS, NECK X2 AND ABDOMINAL benign    DIAGNOSTIC CARDIAC CATH LAB PROCEDURE      DIALYSIS FISTULA CREATION Left 10/30/2017    LEFT BRACHIAL CEPHALIC FISTULA    DIALYSIS FISTULA CREATION Left 3/27/2019    LIGATION  AV FISTULA performed by Kristine Anthony MD at [address] [place] [address], DIAGNOSTIC      OTHER SURGICAL HISTORY  2017    laparoscopic cholecystectomy with intraoperative cholangiogram    OTHER SURGICAL HISTORY  2018    PORT PLACEMENT  - vas cath    OTHER SURGICAL HISTORY Bilateral 06/26/2018    laprascopic peritoneal dialysis catheter placement    OTHER SURGICAL HISTORY Right 09/2018    peritoneal dialysis port placed on right side of abdomen    OTHER SURGICAL HISTORY  05/28/2019    PTA/Stenting R External Iliac artery    [place] LAP INSERTION TUNNELED INTRAPERITONEAL CATHETER N/A 9/21/2018    LAPAROSCOPIC PERITONEAL DIALYSIS CATHETER REPLACEMENT performed by Maureen Catrer MD at [facility] N/A 2/24/2022    PERINEAL ABCESS DRAINAGE performed by Maureen Carter MD at [facility] ENDOSCOPY  01/06/2016    UPPER GASTROINTESTINAL ENDOSCOPY  01/29/2017    possible candida, otherwise normal appearing    VASCULAR SURGERY  aprx 2 years ago    2 stents placed, each side of groin        Past Medical History:   Diagnosis Date    Ambulatory dysfunction     walker for long distances, SOB with distance    Aortic stenosis     echo 2017    Arthritis     hands and hips    Asthma     Bilateral hilar adenopathy syndrome 6/3/2013    CAD (coronary artery disease)     Dr. Fiorella Elkins [facility]) 04/19/2019    EF= 43%    CHF (congestive heart failure) ([facility])     Chronic pain     COPD (chronic obstructive pulmonary disease) ([address]     pulmonology Dr. Danuta Wallis    Depression     Diabetes mellitus ([address]     borderline    Difficult intravenous access     Emphysema of lung ([address]     ESRD (end stage renal disease) on dialysis ([address]     MWF    Fear of needles     Gastric ulcer     GERD (gastroesophageal reflux disease)     Heart valve problem     bicuspic valve    Hemodialysis patient ([address]     History of spinal fracture     work incident    Hx of blood clots     Bilateral lower extremities; stents in place    Hyperlipidemia     Hypertension     MI (myocardial infarction) León Abdalla, APRN - CNP   5 mg at 02/26/22 0956    meropenem (MERREM) 500 mg IVPB (mini-bag)  500 mg IntraVENous Q24H Mckenna Saucedo MD   Stopped at 02/27/22 1029    albuterol (PROVENTIL) nebulizer solution 2.5 mg  2.5 mg Nebulization Q4H PRN Mckenna Saucedo MD        epoetin siddharth-epbx (RETACRIT) injection 10,000 Units  10,000 Units IntraVENous Q MWF Sergio Parsons MD   10,000 Units at 02/28/22 1252    prochlorperazine (COMPAZINE) injection 10 mg  10 mg IntraVENous Q6H PRN Sergio Parsons MD   10 mg at 02/24/22 9854    acetaminophen (TYLENOL) tablet 650 mg  650 mg Oral 3 times per day Sergio Parsons MD   650 mg at 02/28/22 0543    fentaNYL (SUBLIMAZE) injection 25 mcg  25 mcg IntraVENous Q2H PRN Sergio Parsons MD   25 mcg at 02/24/22 0754    tiotropium-olodaterol (STIOLTO) 2.5-2.5 MCG/ACT inhaler 2 puff  2 puff Inhalation Daily Sergio Parsons MD   2 puff at 02/28/22 0803    oxyCODONE (ROXICODONE) immediate release tablet 5 mg  5 mg Oral Q6H PRN Sergio Parsons MD   5 mg at 02/25/22 0609    albuterol sulfate  (90 Base) MCG/ACT inhaler 2 puff  2 puff Inhalation Q6H PRN Sergio Parsons MD        calcium acetate (PHOSLO) capsule 667 mg  667 mg Oral TID  Sergio Parsons MD   667 mg at 02/27/22 1729    clopidogrel (PLAVIX) tablet 75 mg  75 mg Oral Daily Sergio Parsons MD   75 mg at 02/27/22 0744    cyclobenzaprine (FLEXERIL) tablet 5 mg  5 mg Oral TID PRN Sergio Parsons MD   5 mg at 02/28/22 0238    DULoxetine (CYMBALTA) extended release capsule 60 mg  60 mg Oral Daily Sergio Parsons MD   60 mg at 02/27/22 0744    [Held by provider] gabapentin (NEURONTIN) capsule 100 mg  100 mg Oral TID Sergio Parsons MD   100 mg at 02/24/22 2055    [Held by provider] isosorbide mononitrate (IMDUR) extended release tablet 30 mg  30 mg Oral Daily Sergio Parsons MD   30 mg at 02/24/22 0756    [Held by provider] lisinopril (PRINIVIL;ZESTRIL) tablet 10 mg  10 mg Oral Daily Rocio Nolan MD   10 mg at 02/23/22 1241    QUEtiapine (SEROQUEL) tablet 50 mg  50 mg Oral Nightly Rocio Nolan MD   50 mg at 02/27/22 2134    torsemide (DEMADEX) tablet 100 mg  100 mg Oral Daily Rocio Nolan MD   100 mg at 02/27/22 0744    traZODone (DESYREL) tablet 150 mg  150 mg Oral Nightly Rocio Nolan MD   150 mg at 02/27/22 2134    linaclotide (LINZESS) capsule 145 mcg  145 mcg Oral QAM AC Rocio Nolan MD   145 mcg at 02/28/22 0543    pravastatin (PRAVACHOL) tablet 40 mg  40 mg Oral Daily Rocio Nolan MD   40 mg at 02/27/22 0744    insulin lispro (HUMALOG) injection vial 0-18 Units  0-18 Units SubCUTAneous TID WC Rocio Nolan MD   3 Units at 02/27/22 1814    insulin lispro (HUMALOG) injection vial 0-9 Units  0-9 Units SubCUTAneous Nightly Rocio Nolan MD   3 Units at 02/26/22 2240    glucose (GLUTOSE) 40 % oral gel 15 g  15 g Oral PRN Rocio Nolan MD        glucagon (rDNA) injection 1 mg  1 mg IntraMUSCular PRN Rocio Nolan MD        dextrose 5 % solution  100 mL/hr IntraVENous PRN Rocio Nolan MD        sodium chloride flush 0.9 % injection 5-40 mL  5-40 mL IntraVENous 2 times per day Rocio Nolan MD   10 mL at 02/27/22 2134    sodium chloride flush 0.9 % injection 5-40 mL  5-40 mL IntraVENous PRN Rocio Nolan MD   10 mL at 02/26/22 0953    0.9 % sodium chloride infusion  25 mL IntraVENous PRN Rocio Nolan MD   Stopped at 02/26/22 2200    dextrose bolus (hypoglycemia) 10% 125 mL  125 mL IntraVENous PRN Rocio Nolan MD   Stopped at 02/25/22 1227    Or    dextrose bolus (hypoglycemia) 10% 250 mL  250 mL IntraVENous PRN Rocio Nolan MD        vancomycin [facility]) intermittent dosing (placeholder)   Other RX Placeholder Yazan Rich MD           ROS : A 10-14 system review of constitutional, cardiovascular, respiratory, eyes, musculoskeletal, endocrine, GI, ENT, skin, hematological, genitourinary, psychiatric and neurologic systems was obtained and updated today and is unremarkable except as mentioned in my HPI      Exam:     Constitutional:   Vitals:    02/28/22 0550 02/28/22 0715 02/28/22 0805 02/28/22 0929   BP:  126/74  (!) 103/49   Pulse:  80  73   Resp:  18  16   Temp:  97.7 °F (36.5 °C)  98.1 °F (36.7 °C)   TempSrc:  Oral     SpO2:  95% 93%    Weight: 259 lb 14.8 oz (117.9 kg)   251 lb 15.8 oz (114.3 kg)   Height:           General appearance and observation: Normal development and appear in no acute distress. Neck: supple  Cardiovascular: No lower leg edema with good pulsation. Mental Status:   Oriented to person only. Poor memory. Short attention span and concentration. Language: intact naming, repeating and fluency   Poor fund of knowledge. Cranial Nerves:   II: Visual fields: Full. Pupils: equal, round, reactive to light  III,IV,VI: Extra Ocular Movements are intact. No nystagmus  V: Facial sensation is intact  VII: Facial strength and movements: right facial droop noted. VIII: Hearing: Intact  IX: Palate elevation is symmetric  XI: Shoulder shrug is intact  XII: Tongue movements are normal  Musculoskeletal: 5/5 in all 4 extremities. Tone: Normal tone. Reflexes: Symmetric 2+ in the arms and 2+ in the legs   Planters: flexor bilaterally. Coordination: no pronator drift, no dysmetria with FNF in upper extremities. Normal REM. Sensation: normal to all modalities in both arms and legs. Gait/Posture:  Did not test gait.       Data:  LABS:   Lab Results   Component Value Date     02/28/2022    K 4.8 02/28/2022    CL 90 02/28/2022    CO2 22 02/28/2022    BUN 66 02/28/2022    CREATININE 8.8 02/28/2022    GFRAA 8 02/28/2022    GFRAA >60 05/17/2013 LABGLOM 6 02/28/2022    GLUCOSE 139 02/28/2022    PHOS 5.1 02/04/2022    MG 1.80 02/04/2022    CALCIUM 8.7 02/28/2022     Lab Results   Component Value Date    WBC 14.5 02/28/2022    RBC 2.82 02/28/2022    HGB 8.2 02/28/2022    HCT 25.1 02/28/2022    MCV 88.9 02/28/2022    RDW 16.1 02/28/2022     02/28/2022     Lab Results   Component Value Date    INR 1.02 02/01/2022    PROTIME 11.5 02/01/2022       Neuroimaging was independently reviewed by myself and discussed results with the patient and/or family  Reviewed notes from different physicians  Reviewed lab and blood testing    Impression:     Acute/subacute left thalamic and parietal lobe infarct   ESRD on HD  HTN  HLD, uncontrolled. . DM2, uncontrolled. A1c 9.4. PAF on Eliquis  Cellulitis    Recommendation:    Patient is already on Eliquis and Plavix. No objection from a neurological perspective to increasing Eliquis to 5 mg BID. Defer to Cardiology/IM. BP meds on hold for soft BP. Monitor on tele. Continue statin. Improved glycemic control. SSI coverage while inpatient. PT/OT/SLP  Continue abx for cellulitis. HD per nephrology. Nothing more to add from a neurological perspective. Thank you for referring such patient. If you have any questions regarding my consult note, please don't hesitate to call me. Alexis Moraes CNP    Attending Supervising Physician's [address] Statement   I reviewed and agree with the findings and plan as documented in NP consult note   Patient seen and examined today. History is limited from the patient. He was admitted for acute confusion with focal findings. Onset hours prior to admission and Degree was severe.   Duration was persistent  On examination he is awake alert x1 to himself will  Waxing and waning with poor attention concentration  Can follow direction from left more than right  Neck is supple  No abnormal movement no sensory deficit    MRI of the brain showed acute left ischemic hemispheric CVA. He is already on Eliquis and Plavix. Agreed to continue with such blood thinner and adjust the dose of Eliquis  Metabolic work-up for delirium  PT, OT and speech  Continue antibiotics  Repeat CUS  Poor prognosis giving recurrent strokes. Electronically signed by Chantelle Arambula MD on 2/28/22 at 2:44 PM EST      This dictation was generated by voice recognition computer software.  Although all attempts are made to edit the dictation for accuracy, there may be errors in the  transcription that are not intended

## 2022-02-28 NOTE — PLAN OF CARE
Problem: Falls - Risk of:  Goal: Will remain free from falls  Description: Will remain free from falls  Outcome: Ongoing  Goal: Absence of physical injury  Description: Absence of physical injury  Outcome: Ongoing     Problem: Pain:  Goal: Pain level will decrease  Description: Pain level will decrease  Outcome: Ongoing  Goal: Control of acute pain  Description: Control of acute pain  Outcome: Ongoing  Goal: Control of chronic pain  Description: Control of chronic pain  Outcome: Ongoing     Problem: Skin Integrity:  Goal: Will show no infection signs and symptoms  Description: Will show no infection signs and symptoms  Outcome: Ongoing  Goal: Absence of new skin breakdown  Description: Absence of new skin breakdown  Outcome: Ongoing     Problem: Non-Violent Restraints  Goal: Removal from restraints as soon as assessed to be safe  Outcome: Met This Shift  Goal: No harm/injury to patient while restraints in use  Outcome: Met This Shift  Goal: Patient's dignity will be maintained  Outcome: Met This Shift     Problem: HEMODYNAMIC STATUS  Goal: Patient has stable vital signs and fluid balance  Outcome: Ongoing     Problem: ACTIVITY INTOLERANCE/IMPAIRED MOBILITY  Goal: Mobility/activity is maintained at optimum level for patient  Outcome: Ongoing     Problem: COMMUNICATION IMPAIRMENT  Goal: Ability to express needs and understand communication  2/28/2022 0202 by Coy Montenegro RN  Outcome: Ongoing  2/27/2022 1343 by Joyce Birch RN  Outcome: Ongoing

## 2022-02-28 NOTE — PROGRESS NOTES
Physical/Occupational Therapy    PT/OT order received, chart reviewed. Attempted PT/OT evaluation although pt currently off floor for dialysis. Will re-attempt later today or tomorrow, 3/01/22, as schedule permits. Thank you.     Harshad Owusu  PT, DPT #[id number]  Katia Morillo, OTR/L [id number]

## 2022-02-28 NOTE — PROGRESS NOTES
[facility] Progress Note           Subjective/   46y.o. year old male who we are seeing in consultation for ESKD on HD. HPI:  Last HD on 2/26 with 2 liters removed, post-weight of 122 kg. Seen on dialysis. Orders confirmed. Pre-weight at HD today 114.3 kg. ROS:  +weak, intake reduced. Objective/   GEN:  Chronically ill, /74   Pulse 80   Temp 97.7 °F (36.5 °C) (Oral)   Resp 18   Ht 5' 9\" (1.753 m)   Wt 259 lb 14.8 oz (117.9 kg)   SpO2 93%   BMI 38.38 kg/m²   HEENT: non-icteric, no JVD  CV: S1, S2 without m/r/g; no LE edema  RESP: CTA B without w/r/r; breathing wnl  ABD: +bs, soft, nt, no hsm  SKIN: warm, no rashes  ACCESS: Left [facility]    Data/  Recent Labs     02/26/22  0653 02/27/22  0729 02/28/22  0751   WBC 13.4* 14.0* 14.5*   HGB 7.8* 7.9* 8.2*   HCT 23.7* 25.0* 25.1*   MCV 89.9 89.9 88.9    377 435     Recent Labs     02/26/22  0653 02/27/22  0729 02/28/22  0751   * 135* 131*   K 5.3* 4.3 4.8   CL 91* 93* 90*   CO2 17* 21 22   GLUCOSE 96 106* 139*   BUN 82* 53* 66*   CREATININE 10.7* 7.8* 8.8*   LABGLOM 5* 7* 6*   GFRAA 6* 9* 8*       Assessment/     ESKD:  On dialysis MWF at [facility].  Failed PD.  Not transplant candidate.  Had fistula ligated due to dialysis associated steal.  He has not reached his target of 116 kg in over 1 month     Anemia of chronic disease-CKD:  - Hb is below target, on DAVON at the dialysis unit     Hyponatremia:  Due to volume overload in setting of ESRD     Hyperkalemia:  Better with HD     Perineal Pain:  Surgery following and s/p I&D. On broad spectrum IV antibiotics and pain medications    Plan/     - HD today with 2 L UF target as tolerated with crit line monitoring  - Dose DAVON with HD  - Trend labs, bp's   - Palliative care input appreciated    ____________________________________  Ty Mead MD  [facility]  [email or web address]  Office: [phone] 1/week(s)

## 2022-02-28 NOTE — PLAN OF CARE
Problem: Nutrition  Goal: Optimal nutrition therapy  Outcome: Ongoing  Note: Nutrition Problem #1: Inadequate oral intake  Intervention: Food and/or Nutrient Delivery: Continue Current Diet,Start Oral Nutrition Supplement  Nutritional Goals: Pt will have PO intakes of 50% or greater during stay.

## 2022-02-28 NOTE — CARE COORDINATION
Chart reviewed by CM. Triggered by LOS - day # 7. Patient being followed by internal medicine, neurology, cardiology. Current discharge plan evolving at this time. PT/OT ordered by provider today - recs will be helpful in determining discharge needs. Pt is from home with spouse. Keenan Lezama for HD on MWF. Disposition pending specialty sign offs. OF NOTE: pt has a sitter at this time d/t pulling at lines and frequently trying to get out of bed. PT/OT did not have the opportunity to evaluate pt today, d/t being off unit in HD. CM team will continue to follow.

## 2022-02-28 NOTE — CONSULTS
PALLIATIVE MEDICINE CONSULTATION     Patient name:Igor Bergman   AJX:[id number]    :1970  Room/Bed:0368/0368-01   LOS: 7 days         Date of consult:2022    Consult Information  Palliative Medicine Consult performed by: JAY Mckenzie - CNP      Consults  Reason for consult: Em Alarcon, code status  Number of admissions past 12 months: 6      ASSESSMENT/RECOMMENDATIONS     46 y.o. male with ESRD on dialysis, COPD not on home O2, CHF, CAD, CVA and T2DM. His hospital course was complicated by worsening encephalopathy. A MRI of the brain was obtained revealing an acute/subacute small left thalamic and parietal lobe infarcts. He is not decisional or able to discuss his goals at this time. Symptom Management:  Altered mental status - Per chart review seems to be waxing/waning. Is drowsy today and oriented to self, situation only and answers are very slow to come. Being followed by neurology. Pain - low back pain/buttock pain from abscess. Has scheduled tylenol and PRN oxycodone 5-10 mh q4. Currently unable to give me a pain rating. Appears restless and uncomfortable  Goals of Care - Full code. Wife/POA has poor insight into severity of his disease (appears patient has not been forthcoming with truthful info to her in the past) and is shocked where we are today. Ongoing GOC discussion is needed. Ultimately she hopes patient can return home and can stabilize with significant lifestyle changes. Palliative will continue to follow for these conversations. Patient/Family Goals of Care :    22    Spoke with patient while in dialysis. He is drowsy and oriented to self and situation only and is very slow to provide answers. Nods his head that I should talk to his wife, Savage Garcia for information. Spoke with Wife, Savage Garcia and step son, Joey Spencer by phone for 40 minutes. Wife says she was shocked to hear how sick patient was, as she thought he was getting better.   He told her he was back on the list to get a transplant. Tip Nicholson says he was not as shocked and saw this coming. Patient has been sneaking food and sneaking smoking for many years. He says the nurses at dialysis make him eat pizza while he is there. He does not take his medication correctly or regularly. He kicked the prior PT/OT out who tried to work with him. He does nothing but sleep, eat and watch TV. He tells his wife he wants to live as long as possible to be able to support her and her grandchildren. She is unaware of any time that he talked about possibly not continuing with dialysis. Appears wife has limited understanding of what has actually been going on with patient medically over the past several years due to some misleading information he may have been giving her. Nicolle Oneal says patient manipulates and lies to wife constantly so wife ends up enabling him. They feel he needs to hear from the doctors that he is going to die if he doesn't make changes and it would also be helpful if this could be told to him while the family is there to witness (otherwise they feel like patient will lie and deny anyone ever told him this information). We discussed the living will on file (2019)  that stated he wanted  no life support. Wife states \"he wanted to reverse that\". She states that he wants intubation/ventilator, chest compressions, defibrillation and all medications if anything should happen. She knows he does not want to be a \"vegetable\", but she would take him off of life support if that ever became the case. She would like him to remain a full code at this time and says she is certain that is what patient would want. Palliative will continue the [address] conversation with wife (and patient if oriented) together in person tomorrow since she will be visiting.       Disposition/Discharge Plan:   -Pending medical course  -Will benefit from home palliative care if d/c'd home  -Family requesting home nurse to help with medication organization, dietician to help with meal planning and education, PT/OT to work with patient at home (say he will refuse a SNF) and an electric wheel chair (says he doesn't walk because he has a fear of falling). Advance Directives:  Surrogate Decision Maker: Suzie Veloz, wife  Code status:  Full Code    Case discussed with: patient, family, RN  Thank you for allowing us to participate in the care of this patient. HISTORY     CC: shortness of breath  HPI: The patient is a 46 y.o. male with PMH of ESRD on dialysis, CVA, CHF, CAD, HTN, HLD, COPD, ZAINAB, DM2 and obesity who presented to [facility] with shortness of breath and buttock pain. Palliative Medicine SymptomScreening/ROS:    Review of Systems   Unable to perform ROS: Other (minimal participation, unable to get answers)         Patient unable to complete full ROS due to current cognitive status. Information that is obtained from nursing and chart. Home med list and hospital medications reviewed in chart as of 2/28/2022     EXAM     Vitals:    02/28/22 1415   BP: (!) 108/59   Pulse: 78   Resp: 16   Temp: 97.9 °F (36.6 °C)   SpO2: 96%       Physical Examination:   Physical Exam  Vitals reviewed. Constitutional:       Appearance: He is obese. He is ill-appearing. He is not diaphoretic. Comments: drowsy   HENT:      Head: Normocephalic and atraumatic. Nose: Nose normal. No congestion or rhinorrhea. Mouth/Throat:      Mouth: Mucous membranes are moist.      Pharynx: Oropharynx is clear. Eyes:      General: No scleral icterus. Extraocular Movements: Extraocular movements intact. Pupils: Pupils are equal, round, and reactive to light. Cardiovascular:      Rate and Rhythm: Normal rate and regular rhythm. Pulses: Normal pulses. Pulmonary:      Effort: No respiratory distress. Breath sounds: No stridor. No wheezing. Abdominal:      General: There is distension. Palpations: Abdomen is soft. There is no mass. Tenderness: There is no abdominal tenderness. Musculoskeletal:      Cervical back: Normal range of motion and neck supple. Skin:     General: Skin is warm and dry. Capillary Refill: Capillary refill takes more than 3 seconds.    Neurological:      Comments: Oriented to self and situation   Psychiatric:      Comments: Appears agitated, uncomfortable             Current labs in the epic chart reviewed as of 2/28/2022   Review of previous notes, admits, labs, radiology and testing relevant to this consult done in this chart today 2/28/2022      Total time: 85 minutes  >50% of time spent counseling patient at bedside or POA/family member if applicable , reviewing information and discussing care, coordinating with care team  Signed By: Electronically signed by JAY Benito CNP on 2/28/2022 at 3:15 PM  Palliative Medicine   [phone]    February 28, 2022

## 2022-02-28 NOTE — PROGRESS NOTES
Hospitalist Progress Note      PCP: Mckay Melton MD    Date of Admission: 2/21/2022    Chief Complaint: perianal pain and dyspnea     Hospital Course:  In brief this is a 46 yoM with ESRD and TIIDM presenting with perianal pain concerning for cellulitis and dyspnea likely from volume overload. Hospital course has been complicated by worsening encephalopathy concerning for hypoactive delirium with now acute /subacute thalamic infarct.     Subjective: seen in HD, denies cp/sob/abd pain, resting in bed, sitter at bedside       Medications:  Reviewed    Infusion Medications    dextrose      sodium chloride Stopped (02/26/22 2200)     Scheduled Medications    vancomycin  750 mg IntraVENous Once    pantoprazole  40 mg Oral QAM AC    metoprolol succinate  50 mg Oral BID    apixaban  2.5 mg Oral BID    meropenem  500 mg IntraVENous Q24H    epoetin siddharth-epbx  10,000 Units IntraVENous Q MWF    acetaminophen  650 mg Oral 3 times per day    tiotropium-olodaterol  2 puff Inhalation Daily    calcium acetate  667 mg Oral TID WC    clopidogrel  75 mg Oral Daily    DULoxetine  60 mg Oral Daily    [Held by provider] gabapentin  100 mg Oral TID    [Held by provider] isosorbide mononitrate  30 mg Oral Daily    [Held by provider] lisinopril  10 mg Oral Daily    QUEtiapine  50 mg Oral Nightly    torsemide  100 mg Oral Daily    traZODone  150 mg Oral Nightly    linaclotide  145 mcg Oral QAM AC    pravastatin  40 mg Oral Daily    insulin lispro  0-18 Units SubCUTAneous TID WC    insulin lispro  0-9 Units SubCUTAneous Nightly    sodium chloride flush  5-40 mL IntraVENous 2 times per day    vancomycin (VANCOCIN) intermittent dosing (placeholder)   Other RX Placeholder     PRN Meds: oxyCODONE, heparin (porcine), haloperidol lactate, albuterol, prochlorperazine, fentanNYL, oxyCODONE, albuterol sulfate HFA, cyclobenzaprine, glucose, glucagon (rDNA), dextrose, sodium chloride flush, sodium chloride, dextrose bolus (hypoglycemia) **OR** dextrose bolus (hypoglycemia)      Intake/Output Summary (Last 24 hours) at 2/28/2022 1117  Last data filed at 2/27/2022 1345  Gross per 24 hour   Intake 120 ml   Output --   Net 120 ml       Physical Exam Performed:    /74   Pulse 80   Temp 97.7 °F (36.5 °C) (Oral)   Resp 18   Ht 5' 9\" (1.753 m)   Wt 259 lb 14.8 oz (117.9 kg)   SpO2 93%   BMI 38.38 kg/m²     General appearance/pscyh: alert and oriented x3, follows commands, appears comfortable  HEENT: Pupils equal, round, and reactive to light. Conjunctivae/corneas clear. Neck: Supple, with full range of motion. No rigiditiy  Respiratory:  Normal respiratory effort. Cough during exam with some bibasilar crackles noted   Cardiovascular: Regular rate and rhythm with normal S1/S2 without murmurs, rubs or gallops. Abdomen: Soft, non-tender, non-distended with normal bowel sounds. Neurologic/MSK:  Non focal exam, full ROM, no cranial nerve deficit, strength equal 5/5 in all extremities. Capillary Refill: Brisk,3 seconds, normal   Peripheral Pulses: +2 palpable, equal bilaterally          Labs:   Recent Labs     02/26/22  0653 02/27/22  0729 02/28/22  0751   WBC 13.4* 14.0* 14.5*   HGB 7.8* 7.9* 8.2*   HCT 23.7* 25.0* 25.1*    377 435     Recent Labs     02/26/22  0653 02/27/22  0729 02/28/22  0751   * 135* 131*   K 5.3* 4.3 4.8   CL 91* 93* 90*   CO2 17* 21 22   BUN 82* 53* 66*   CREATININE 10.7* 7.8* 8.8*   CALCIUM 8.4 8.6 8.7     Recent Labs     02/26/22  0653 02/27/22  0729 02/28/22  0751   AST 9* 12* 7*   ALT 9* 7* 6*   BILITOT <0.2 <0.2 <0.2   ALKPHOS 102 105 102     No results for input(s): INR in the last 72 hours. No results for input(s): Ifeoma Councilman in the last 72 hours.     Urinalysis:      Lab Results   Component Value Date    NITRU Negative 02/22/2022    WBCUA 3-5 02/22/2022    BACTERIA Rare 02/22/2022    RBCUA 0-2 02/22/2022    BLOODU TRACE-INTACT 02/22/2022    SPECGRAV 1.015 02/22/2022    GLUCOSEU 250 02/22/2022       Radiology:  XR CHEST PORTABLE   Final Result   Hypoventilatory changes noted in the left lung base. MRI BRAIN WO CONTRAST   Final Result   1. Acute/early subacute infarct involving the left thalamus and left parietal   lobe towards the vertex. 2. Cerebral parenchymal volume loss with chronic microvascular white matter   ischemic disease. 3. Chronic infarcts are noted in the periventricular white matter, right   thalamus, right layo, and bilateral cerebellar hemispheres. 4. Worsening bilateral mastoid effusions and paranasal sinus disease. CT HEAD WO CONTRAST   Preliminary Result   Mild cerebral atrophy. Remote lacunar infarcts in the basal ganglia and both   thalami. There is a new area of low attenuation in the mid lateral portion of the left   thalamus consistent with an infarct of indeterminate age. MRI is more   sensitive for evaluation of the brain parenchyma if indicated. CT CHEST PULMONARY EMBOLISM W CONTRAST   Final Result   There is a suboptimal contrast bolus, limiting evaluation of the segmental   and subsegmental branches. No central pulmonary embolism is detected. Patchy airspace disease in the lower lungs bilaterally, greater on the left,   most compatible with multifocal pneumonia or aspiration. Small left pleural   effusion has developed. Mildly enlarged mediastinal lymph nodes, similar compared to the previous   exam.  These are most likely reactive, but a follow-up chest CT in   approximately 3 months is recommended to ensure resolution. The small nodule seen previously within the left lower lobe is obscured on   the current examination. Please refer to the previous exam for   recommendations. XR CHEST PORTABLE   Final Result   No acute process. CT PELVIS WO CONTRAST Additional Contrast? None   Final Result   1.  Small 14 mm ovoid fluid collection in the subcutaneous fat of the right   perineum unchanged from at least 01/01/2021. Minimal adjacent subcutaneous   fat stranding. This may represent a sebaceous cyst with superimposed   infection not excluded. 2. No soft tissue gas or other drainable fluid collection. 3. No acute osseous abnormality. XR CHEST PORTABLE   Final Result   Vascular congestion. No consolidation. Stable cardiomegaly. VL DUP CAROTID BILATERAL    (Results Pending)           Assessment/Plan:    Active Hospital Problems    Diagnosis     Acute on chronic combined systolic and diastolic heart failure (HCC) [I50.43]      Priority: High    Dyslipidemia [E78.5]      Priority: High    Type 2 diabetes, uncontrolled, with neuropathy (HCC) [E11.40, E11.65]      Priority: High    Coronary artery disease involving native coronary artery of native heart without angina pectoris [I25.10]      Priority: High    Essential hypertension [I10]      Priority: Medium    [place]-rectal abscess [K61.1]     Somnolence [R40.0]     Atrial flutter (HCC) [I48.92]     SIRS (systemic inflammatory response syndrome) ([facility]) [R65.10]     ESRD on hemodialysis ([facility] [address] [N18.6, Z99.2]     Elevated troponin [R77.8]     Obesity (BMI 30-39. 9) [E66.9]     Sepsis without acute organ dysfunction (HCC) [A41.9]          Encephalopathy secondary to Acute/subacute thalamic CVA- encephalopathy improved.   -CT head and MRI with left thalamic and left parietal lobe, also noted chronic infarcts in periventricular white matter, right thalamus, layo and bilateral hemispheres  -neurology consulted as family would appreciate their input understanding it may not change mgmt, would have them weigh in on pts risk of hemorraghic conversion with increasing apixaban dose to 5mg (HASBLED score 6 but stroke is small and pt started on DOAC >24 hours after presumed incident)  - neuro check, NIHHS ordered, last known normal >24 hours ago  - continued Plavix and Apixaban, statin   - hold gabapentin    Perianal cellulitis w/ sepsis (SIRS criteira WBC 13.1, )  - CT pelvis revealed: small 14 mm ovoid fluid collection in subcutaneous fat of the right perineum unchanged from atleast 01/01/2021. Minimal adjacent subcutaneous fat stranding. This may represent a sebaceous cyst with superimposed infection. No soft tissue gas or other drainable fluid collection.  -surgical cultures: 1+ GPC cocci, Bld cx NGTD  - abcx can likely complete a 7-10 day course (end 02/28-3/3)  - pain regimen adjusted: acetaminophen scheduled, oxycodone PO for breakthrough pain and IV opioids if PO insufficient.     Leukocytosis- can be reactive in setting of acute stroke versus ongoing infection. Only new focal finding is cough. Per nursing pt had loose stools today but not diarrhea. Pt did not want me to see perianal wound, per nursing it looks very well. Culture data NGTD. On adequate broad spectrum abcx. Hold course for now as pt is clinically improving.  - f/u CXR 2/27(noted hypoventilatory changes in LLL)  - monitor BM      Acute hypoxic respiratory failure- likely volume overload from inadequate iHD based on initial CXR but sxs seem to overnight developed hypoxia (88%) on 2L NC. Initially presentation was dyspnea with out hypoxia. Suspect pt may have now aspirated. Ddimer acutely elevated > 2000 but CTA without PE. Covid rapid negative. - continue iHD with nephrology assistance. - overnight/naps CPAP  - wean oxygen as able     TIIDM- a1c 9.4 on 1/12/2022. Hypoglycemia noted. Holding basal and continuing SSI     Afib/aflutter with RVR-.  CHADSVASC 6.  - cardiology was following, f/u on 3/24 as outpt  - now on DOAC, stopped asa, continued plavix      Essential HTN  -continue metoprolol; lisinopril on hold d/t hypotension recently but can likely restart if hemodynamics remain stable     HLD  -continue pravastatin     Elevated troponin, 0.15-->0.17 on admission  -no c/o chest pain  -likely 2/2 demand ischemia r/t ESRD  -trend troponin flat  -tele monitoring     Acute on chronic CHF, stable  -strict I&O  -daily weights (dry wt 119)  -continued demadex     Chronic normocytic anemia likely from anemia of chronic disease  -no s/s of bleeding at this time     Anxiety depression  -continue home meds  -mood stable at this time     ESRD iHD MWF  - nephrology following     Obesity  With Body mass index is 38.4 kg/m². Complicating assessment and treatment. Placing patient at risk for multiple co-morbidities as well as early death and contributing to the patient's presentation. Counseled on weight loss     DVT Prophylaxis: heparin ppx  Diet: ADULT DIET; Regular; 4 carb choices (60 gm/meal);  Low Sodium (2 gm)  Code Status: Full Code      PT/OT Eval Status:   Ordered 2/28     Family updated 02/26, see ACP note, palliative care consulted hopefully family meeting 02/28     Dispo - pending clinical course, pending palliative care recs, final surgery recs, hopefully 02/29, pending neuro Lee Alas MD

## 2022-02-28 NOTE — PROGRESS NOTES
02/27/22 5762   NIV Type   NIV Started/Stopped On   Equipment Type v60   Mode Bilevel   Mask Type Full face mask   Mask Size Medium   Settings/Measurements   IPAP 16 cmH20   CPAP/EPAP 10 cmH2O   Rate Ordered 12   Resp 20   FiO2  30 %   Vt Exhaled 559 mL   Mask Leak (lpm) 10 lpm   Comfort Level Good   Using Accessory Muscles No   Alarm Settings   Alarms On Y   Press Low Alarm 6 cmH2O   High Pressure Alarm 30 cmH2O   Resp Rate Low Alarm 6   High Respiratory Rate 40 br/min

## 2022-02-28 NOTE — PROGRESS NOTES
overnight, on Eliquis for anticoagulation   2. Altered mental status: MRI notable for new CVA (not hemorrhagic), resolved  3. CAD: s/p recent PCI to RCA in January 2022, history of PCI to LAD, on aspirin (stopped), Plavix, statin and beta-blocker  4. Aortic stenosis due to bicuspid aortic valve, status post TAVR:  5. Ischemic cardiomyopathy: LVEF 40-45%  6. CHF, chronic combined: Fluid management per dialysis  7. ESRD on HD: Per nephrology  8. Diabetes mellitus: Poorly controlled  9. PAD      PLAN:  1. Continue Eliquis 2.5 mg p.o. twice daily for anticoagulation  2. Stop aspirin, continue Plavix  3. Continue metoprolol 50 mg twice daily  4. Lisinopril and Imdur being held for hypotension  5. Neurology has been consulted  6. Nothing further to contribute from a cardiac perspective. Will sign off.   He has appointment with me on 3/24/2022      JAY Méndez CNP, 2/28/2022, 11:37 AM  [facility], [place], operated by [facility]   [phone]       Telemetry: SR 80-90  NYHA: IV    Physical Exam:  General:  Awake, alert, NAD   Skin:  Warm and dry  Neck:  JVP difficult to assess  Chest: Coarse throughout anteriorly  Cardiovascular:  RRR, normal S1S2, distant heart tones though no appreciable MRG  Abdomen:  Soft, nontender, +bowel sounds  Extremities: No BLE edema      Medications:    vancomycin  750 mg IntraVENous Once    pantoprazole  40 mg Oral QAM AC    metoprolol succinate  50 mg Oral BID    apixaban  2.5 mg Oral BID    meropenem  500 mg IntraVENous Q24H    epoetin siddharth-epbx  10,000 Units IntraVENous Q MWF    acetaminophen  650 mg Oral 3 times per day    tiotropium-olodaterol  2 puff Inhalation Daily    calcium acetate  667 mg Oral TID WC    clopidogrel  75 mg Oral Daily    DULoxetine  60 mg Oral Daily    [Held by provider] gabapentin  100 mg Oral TID    [Held by provider] isosorbide mononitrate  30 mg Oral Daily    [Held by provider] lisinopril  10 mg Oral Daily    QUEtiapine  50 mg Oral Nightly    torsemide 100 mg Oral Daily    traZODone  150 mg Oral Nightly    linaclotide  145 mcg Oral QAM AC    pravastatin  40 mg Oral Daily    insulin lispro  0-18 Units SubCUTAneous TID WC    insulin lispro  0-9 Units SubCUTAneous Nightly    sodium chloride flush  5-40 mL IntraVENous 2 times per day    vancomycin (VANCOCIN) intermittent dosing (placeholder)   Other RX Placeholder      dextrose      sodium chloride Stopped (02/26/22 2200)       Lab Data: Lab results independently reviewed and analyzed by myself 2/28/2022    CBC:   Recent Labs     02/26/22  0653 02/27/22  0729 02/28/22  0751   WBC 13.4* 14.0* 14.5*   HGB 7.8* 7.9* 8.2*    377 435     BMP:    Recent Labs     02/26/22  0653 02/27/22  0729 02/28/22  0751   * 135* 131*   K 5.3* 4.3 4.8   CO2 17* 21 22   BUN 82* 53* 66*   CREATININE 10.7* 7.8* 8.8*     INR:  No results for input(s): INR in the last 72 hours. BNP:    No results for input(s): PROBNP in the last 72 hours. Cardiac Enzymes: No results for input(s): TROPONINI in the last 72 hours. Lipids:   Lab Results   Component Value Date    TRIG 214 11/30/2021    TRIG 187 10/12/2021    HDL 53 11/30/2021    HDL 43 10/12/2021    LDLCALC 155 11/30/2021    LDLCALC 128 10/12/2021    LDLDIRECT 199 09/04/2014    LDLDIRECT 172 03/07/2014       Cardiac Imaging:    Coronary angiogram 1/14/2022:  1. Left heart catheterization  2. Selective left and right coronary angiogram  3. PCI of the RCA with PATRICIA  Procedure Findings:  1. 99% mid RCA  2. 60-70% CIRC and DIAG  3. Elevated left heart hemodynamics              ~LVEDP 30       Details:              Chuy Davis was brought to the cardiac catheterization lab in a fasting state after informed consent was obtained. If the patient was able to provide written consent, it was obtained. The patient's vitals were monitored through out the procedure. The patient was sedated using the appropriate levels of sedation and ASA guidelines.               The appropriate access site area was prepped and drapped in a sterile fashion. The area was anesthetized with 2% lidocaine. Using the modified Seldinger technique, an arterial sheath was introduced into the arterial access site using a single anterior wall puncture. The sheath was flushed and prepped in usual fashion. Catheters used during the procedure included 5 Kosovan TIG 4.0 catheter. The catheters were advanced and removed over a .035\" wire, into the appropriate positions. Multiple angiographic views were obtained. An LV gram was not obtained. ~The interventional portion of the procedure was completed utilizing a multipurpose 6 [place] guide. Multipurpose guide was advanced into the right coronary ostium. A gentleman therapy aspirin, Plavix, Angiomax was initiated. A BMW wire was advanced into the distal right coronary artery. The lesion was initially predilated with a 2.75 x 15 mm Euphora balloon followed by a 3.5 mm x 20 mm Euphora balloon and subsequently stented with a resolute [place] 4.0 mm x 38 mm balloon. We did experience a no reflow phenomenon. We separately done. A twin pass catheter and then did local drug delivery with 200 mcg of nitroprusside and 200 mcgnitroglycerin. Provided restoration of SCARLETT-3 flow. Findings:  1. Left main coronary artery was normal. It gave off the left anterior descending artery and left circumflex. 2. Left anterior descending artery has 60% severe atherosclerotic disease. It was moderate in size. It gave off septal perforators and a moderate sized diagonal branch. The LAD covered the entire apex of the left ventricle. 3. Left circumflex has 60% severe atherosclerotic disease. It was moderate in size. There was a moderate sized obtuse marginal branch. 4. Right coronary artery has 99% severe atherosclerotic disease. It was moderate in size and was the dominant artery. CONCLUSIONS:  1.   Apercutaneous coronary intervention of the right coronary artery utilizing a single resolute Wilalm drug-eluting stent  ASSESSMENT/RECOMMENDATIONS:  1. Dual antiplatelet therapy  2. Medical management of the remaining coronary disease     Echo 1/2022:   Limited only f/u for LVEF and RVF. The left ventricular systolic function is mildly reduced with an ejection   fraction of 40-45 %. There is hypokinesis of the apex and inferior walls. There is a very small circumferential pericardial effusion noted. No tamponade physiology. The right ventricle is normal in size and function. Echo 7/9/2020:  Low Normal systolic function with an estimated ejection fraction of 50%. Left ventricular function and wall motion is difficult to estimate due to   poor endocardial visualization. Grade I diastolic dysfunction with normal filing pressure. Normally functioning TAVR 29 mm Langford Leyda S3 bioprosthetic valve in   aortic position appears well seated with a max velocity of 2.11 m/sec,   maximum gradient of 18 mmHg and a mean gradient of 8 mmHg. There is no evidence of aortic regurgitation  Coronary angiogram 8/22/2019:  LM <20%  LAD patent stent  Cx <20%  RCA 30%  Severe AS by echo  Proceed w/ TAVR  Echo 1/24/2017:  Summary   Left ventricular systolic function is normal with an ejection fraction of   55-60%. No wall motion abnormalities noted. Mild concentric left ventricular hypertrophy. Grade II diastolic dysfunction with elevated filling pressure. Mildly dilated left atrium. Moderate aortic stenosis. Mild aortic regurgitation. Compared to previous study from 10/27/2016, aortic stenosis has not   progressed.         R/LHC 1/23/2017:  LM <20%  LAD 30% w/ patent stent  Cx 20-30%  RCA 20-30%  LVEDP 19  RA 8, RV 41/10, PA 45/14/31, W 15      Nonobstructive CAD  Mild elevated right heart pressures      Echo Oct 2016:   Normal left ventricle size with mild concentric left ventricular hypertrophy.   Normal systolic function with an estimated ejection fraction of 55%.   No regional wall motion abnormalities are seen.   Elevated left ventricular diastolic filling pressure ( E/e' 21 ).  The aortic valve is thickened/calcified with decreased leaflet mobility. Cannot exclude a bicuspid valve.  The aortic valve area is calculated at 1.0 cm2 with a max velocity of 3.36 m/sec, maximum pressure gradient of 45 mmHg and a mean pressure gradient of 23 mmHg. This is c/w moderate aortic stenosis. Color flow demonstrates eccentric jet suggesting mild aortic regurgitation.   Trace mitral and tricuspid regurgitation. Systolic pulmonary artery pressure (SPAP) is normal and estimated at 22 mmHg (RA pressure 3 mmHg).   There is mild concentric left ventricular hypertrophy. MPI 6/18/15: There is a very small and mild fixed posterior-basal defect consistent with  fibrosis. There is no evidence for ischemia. Left ventricular function is reduced with and estimated LVEF of 40%. The LV is severely dilated. CATH: 5/26/15, Dr. Beatrice Hair  LM <20%   LAD 70% mid. FFR 0.77  D1 50% prox  Cx 20%  RCA 20%  LVEDP 22mmHg  RA 9, RV 42/10, PA 44/16/31, W 18  PA sat 63%    PTCA LAD  3.0 x 15 PATRICIA  prox portion post 3.5 x 8 NC balloon    DAPT, statin  Resume lasix and ARB at discharge provided Cr stable  Renal fxn will not tolerate higher dose of lasix than 40 daily  Needs smoking cessation, weight loss, exercise  Rec OP sleep eval       Echo 3/26/15:   Left ventricle size is normal.  Mild c LVH. EF 55 % to 60 %. No regional wall motion abnormalities are noted. Normal diastolic filling pattern for age. Mild mitral regurgitation is present. Mildly dilated left atrium by increased left atrial volume. Aortic valve is mildly thickened and calcified. The aortic valve area is calculated at 1.4 cm2 with a maximum gradient of 32 mmHg and a mean gradient of 18 mmHg. This is c/w mild aortic stenosis. Trace aortic regurgitation. Trivial tricuspid regurgitation.   Systolic pulmonary artery pressure (SPAP) is normal and estimated at 14 mmHg (RA pressure 3 mm Hg).

## 2022-03-01 ENCOUNTER — APPOINTMENT (OUTPATIENT)
Dept: VASCULAR LAB | Age: 52
DRG: 871 | End: 2022-03-01
Payer: COMMERCIAL

## 2022-03-01 LAB
A/G RATIO: 1.1 (ref 1.1–2.2)
ALBUMIN SERPL-MCNC: 3.2 G/DL (ref 3.4–5)
ALP BLD-CCNC: 103 U/L (ref 40–129)
ALT SERPL-CCNC: 7 U/L (ref 10–40)
ANION GAP SERPL CALCULATED.3IONS-SCNC: 20 MMOL/L (ref 3–16)
AST SERPL-CCNC: 10 U/L (ref 15–37)
BANDED NEUTROPHILS RELATIVE PERCENT: 10 % (ref 0–7)
BASOPHILS ABSOLUTE: 0 K/UL (ref 0–0.2)
BASOPHILS RELATIVE PERCENT: 0 %
BILIRUB SERPL-MCNC: <0.2 MG/DL (ref 0–1)
BLOOD CULTURE, ROUTINE: NORMAL
BUN BLDV-MCNC: 39 MG/DL (ref 7–20)
CALCIUM SERPL-MCNC: 8.7 MG/DL (ref 8.3–10.6)
CHLORIDE BLD-SCNC: 97 MMOL/L (ref 99–110)
CO2: 20 MMOL/L (ref 21–32)
CREAT SERPL-MCNC: 5.9 MG/DL (ref 0.9–1.3)
CULTURE, BLOOD 2: NORMAL
EOSINOPHILS ABSOLUTE: 0.1 K/UL (ref 0–0.6)
EOSINOPHILS RELATIVE PERCENT: 1 %
GFR AFRICAN AMERICAN: 12
GFR NON-AFRICAN AMERICAN: 10
GLUCOSE BLD-MCNC: 113 MG/DL (ref 70–99)
GLUCOSE BLD-MCNC: 122 MG/DL (ref 70–99)
GLUCOSE BLD-MCNC: 125 MG/DL (ref 70–99)
GLUCOSE BLD-MCNC: 132 MG/DL (ref 70–99)
GLUCOSE BLD-MCNC: 151 MG/DL (ref 70–99)
GLUCOSE BLD-MCNC: 154 MG/DL (ref 70–99)
GLUCOSE BLD-MCNC: 176 MG/DL (ref 70–99)
HCT VFR BLD CALC: 24.9 % (ref 40.5–52.5)
HEMOGLOBIN: 8.2 G/DL (ref 13.5–17.5)
LYMPHOCYTES ABSOLUTE: 1.1 K/UL (ref 1–5.1)
LYMPHOCYTES RELATIVE PERCENT: 8 %
MCH RBC QN AUTO: 29.5 PG (ref 26–34)
MCHC RBC AUTO-ENTMCNC: 33 G/DL (ref 31–36)
MCV RBC AUTO: 89.4 FL (ref 80–100)
METAMYELOCYTES RELATIVE PERCENT: 1 %
MONOCYTES ABSOLUTE: 0.5 K/UL (ref 0–1.3)
MONOCYTES RELATIVE PERCENT: 4 %
MYELOCYTE PERCENT: 3 %
NEUTROPHILS ABSOLUTE: 11.8 K/UL (ref 1.7–7.7)
NEUTROPHILS RELATIVE PERCENT: 73 %
PDW BLD-RTO: 16.1 % (ref 12.4–15.4)
PERFORMED ON: ABNORMAL
PLATELET # BLD: 436 K/UL (ref 135–450)
PLATELET SLIDE REVIEW: ADEQUATE
PMV BLD AUTO: 8.2 FL (ref 5–10.5)
POLYCHROMASIA: ABNORMAL
POTASSIUM REFLEX MAGNESIUM: 4.6 MMOL/L (ref 3.5–5.1)
RBC # BLD: 2.79 M/UL (ref 4.2–5.9)
SLIDE REVIEW: ABNORMAL
SODIUM BLD-SCNC: 137 MMOL/L (ref 136–145)
TOTAL PROTEIN: 6.1 G/DL (ref 6.4–8.2)
WBC # BLD: 13.6 K/UL (ref 4–11)

## 2022-03-01 PROCEDURE — 85025 COMPLETE CBC W/AUTO DIFF WBC: CPT

## 2022-03-01 PROCEDURE — 6370000000 HC RX 637 (ALT 250 FOR IP): Performed by: STUDENT IN AN ORGANIZED HEALTH CARE EDUCATION/TRAINING PROGRAM

## 2022-03-01 PROCEDURE — 6360000002 HC RX W HCPCS: Performed by: STUDENT IN AN ORGANIZED HEALTH CARE EDUCATION/TRAINING PROGRAM

## 2022-03-01 PROCEDURE — 6370000000 HC RX 637 (ALT 250 FOR IP): Performed by: SURGERY

## 2022-03-01 PROCEDURE — 97535 SELF CARE MNGMENT TRAINING: CPT

## 2022-03-01 PROCEDURE — 99024 POSTOP FOLLOW-UP VISIT: CPT | Performed by: SURGERY

## 2022-03-01 PROCEDURE — 97530 THERAPEUTIC ACTIVITIES: CPT

## 2022-03-01 PROCEDURE — 94660 CPAP INITIATION&MGMT: CPT

## 2022-03-01 PROCEDURE — 97166 OT EVAL MOD COMPLEX 45 MIN: CPT

## 2022-03-01 PROCEDURE — 36415 COLL VENOUS BLD VENIPUNCTURE: CPT

## 2022-03-01 PROCEDURE — 93880 EXTRACRANIAL BILAT STUDY: CPT

## 2022-03-01 PROCEDURE — 6360000002 HC RX W HCPCS: Performed by: NURSE PRACTITIONER

## 2022-03-01 PROCEDURE — 6370000000 HC RX 637 (ALT 250 FOR IP): Performed by: NURSE PRACTITIONER

## 2022-03-01 PROCEDURE — 94640 AIRWAY INHALATION TREATMENT: CPT

## 2022-03-01 PROCEDURE — 1200000000 HC SEMI PRIVATE

## 2022-03-01 PROCEDURE — 2580000003 HC RX 258: Performed by: SURGERY

## 2022-03-01 PROCEDURE — 97116 GAIT TRAINING THERAPY: CPT

## 2022-03-01 PROCEDURE — 80053 COMPREHEN METABOLIC PANEL: CPT

## 2022-03-01 PROCEDURE — 2500000003 HC RX 250 WO HCPCS: Performed by: SURGERY

## 2022-03-01 PROCEDURE — 97162 PT EVAL MOD COMPLEX 30 MIN: CPT

## 2022-03-01 PROCEDURE — 2700000000 HC OXYGEN THERAPY PER DAY

## 2022-03-01 PROCEDURE — 94761 N-INVAS EAR/PLS OXIMETRY MLT: CPT

## 2022-03-01 PROCEDURE — 2580000003 HC RX 258: Performed by: STUDENT IN AN ORGANIZED HEALTH CARE EDUCATION/TRAINING PROGRAM

## 2022-03-01 RX ADMIN — OXYCODONE 10 MG: 5 TABLET ORAL at 13:51

## 2022-03-01 RX ADMIN — TRAZODONE HYDROCHLORIDE 150 MG: 50 TABLET ORAL at 20:36

## 2022-03-01 RX ADMIN — METOPROLOL SUCCINATE 50 MG: 50 TABLET, EXTENDED RELEASE ORAL at 20:37

## 2022-03-01 RX ADMIN — PANTOPRAZOLE SODIUM 40 MG: 40 TABLET, DELAYED RELEASE ORAL at 05:57

## 2022-03-01 RX ADMIN — DULOXETINE HYDROCHLORIDE 60 MG: 60 CAPSULE, DELAYED RELEASE ORAL at 09:11

## 2022-03-01 RX ADMIN — ACETAMINOPHEN 650 MG: 325 TABLET ORAL at 21:14

## 2022-03-01 RX ADMIN — TIOTROPIUM BROMIDE AND OLODATEROL 2 PUFF: 3.124; 2.736 SPRAY, METERED RESPIRATORY (INHALATION) at 07:47

## 2022-03-01 RX ADMIN — OXYCODONE 10 MG: 5 TABLET ORAL at 09:11

## 2022-03-01 RX ADMIN — OXYCODONE 10 MG: 5 TABLET ORAL at 02:09

## 2022-03-01 RX ADMIN — SODIUM CHLORIDE, PRESERVATIVE FREE 10 ML: 5 INJECTION INTRAVENOUS at 20:36

## 2022-03-01 RX ADMIN — APIXABAN 2.5 MG: 2.5 TABLET, FILM COATED ORAL at 09:11

## 2022-03-01 RX ADMIN — METOPROLOL SUCCINATE 50 MG: 50 TABLET, EXTENDED RELEASE ORAL at 09:11

## 2022-03-01 RX ADMIN — ACETAMINOPHEN 650 MG: 325 TABLET ORAL at 05:57

## 2022-03-01 RX ADMIN — PRAVASTATIN SODIUM 40 MG: 40 TABLET ORAL at 09:11

## 2022-03-01 RX ADMIN — QUETIAPINE FUMARATE 50 MG: 25 TABLET ORAL at 20:36

## 2022-03-01 RX ADMIN — CYCLOBENZAPRINE 5 MG: 10 TABLET, FILM COATED ORAL at 13:52

## 2022-03-01 RX ADMIN — TORSEMIDE 100 MG: 100 TABLET ORAL at 09:11

## 2022-03-01 RX ADMIN — CLOPIDOGREL BISULFATE 75 MG: 75 TABLET ORAL at 09:11

## 2022-03-01 RX ADMIN — INSULIN LISPRO 3 UNITS: 100 INJECTION, SOLUTION INTRAVENOUS; SUBCUTANEOUS at 13:55

## 2022-03-01 RX ADMIN — CALCIUM ACETATE 667 MG: 667 CAPSULE ORAL at 13:52

## 2022-03-01 RX ADMIN — CALCIUM ACETATE 667 MG: 667 CAPSULE ORAL at 09:14

## 2022-03-01 RX ADMIN — OXYCODONE 10 MG: 5 TABLET ORAL at 20:36

## 2022-03-01 RX ADMIN — HALOPERIDOL LACTATE 5 MG: 5 INJECTION, SOLUTION INTRAMUSCULAR at 02:31

## 2022-03-01 RX ADMIN — MEROPENEM 500 MG: 500 INJECTION, POWDER, FOR SOLUTION INTRAVENOUS at 09:16

## 2022-03-01 RX ADMIN — APIXABAN 2.5 MG: 2.5 TABLET, FILM COATED ORAL at 20:36

## 2022-03-01 RX ADMIN — SODIUM CHLORIDE, PRESERVATIVE FREE 10 ML: 5 INJECTION INTRAVENOUS at 09:12

## 2022-03-01 RX ADMIN — INSULIN LISPRO 2 UNITS: 100 INJECTION, SOLUTION INTRAVENOUS; SUBCUTANEOUS at 20:37

## 2022-03-01 ASSESSMENT — PAIN DESCRIPTION - PAIN TYPE
TYPE: ACUTE PAIN;CHRONIC PAIN
TYPE: ACUTE PAIN

## 2022-03-01 ASSESSMENT — PAIN DESCRIPTION - FREQUENCY
FREQUENCY: CONTINUOUS
FREQUENCY: CONTINUOUS

## 2022-03-01 ASSESSMENT — PAIN SCALES - GENERAL
PAINLEVEL_OUTOF10: 10
PAINLEVEL_OUTOF10: 7
PAINLEVEL_OUTOF10: 7
PAINLEVEL_OUTOF10: 10
PAINLEVEL_OUTOF10: 8
PAINLEVEL_OUTOF10: 5
PAINLEVEL_OUTOF10: 2
PAINLEVEL_OUTOF10: 8
PAINLEVEL_OUTOF10: 0
PAINLEVEL_OUTOF10: 8
PAINLEVEL_OUTOF10: 9

## 2022-03-01 ASSESSMENT — PAIN DESCRIPTION - ORIENTATION
ORIENTATION: LOWER
ORIENTATION: LOWER

## 2022-03-01 ASSESSMENT — PAIN DESCRIPTION - ONSET: ONSET: ON-GOING

## 2022-03-01 ASSESSMENT — PAIN DESCRIPTION - LOCATION
LOCATION: BACK;BUTTOCKS
LOCATION: BACK
LOCATION: BACK

## 2022-03-01 ASSESSMENT — PAIN SCALES - WONG BAKER: WONGBAKER_NUMERICALRESPONSE: 4

## 2022-03-01 ASSESSMENT — PAIN DESCRIPTION - DESCRIPTORS
DESCRIPTORS: ACHING
DESCRIPTORS: DISCOMFORT
DESCRIPTORS: SHARP;STABBING

## 2022-03-01 ASSESSMENT — PAIN - FUNCTIONAL ASSESSMENT: PAIN_FUNCTIONAL_ASSESSMENT: PREVENTS OR INTERFERES SOME ACTIVE ACTIVITIES AND ADLS

## 2022-03-01 ASSESSMENT — PAIN DESCRIPTION - PROGRESSION: CLINICAL_PROGRESSION: NOT CHANGED

## 2022-03-01 NOTE — PLAN OF CARE
Problem: Falls - Risk of:  Goal: Will remain free from falls  Description: Will remain free from falls  Outcome: Ongoing  Goal: Absence of physical injury  Description: Absence of physical injury  Outcome: Ongoing     Problem: Pain:  Goal: Pain level will decrease  Description: Pain level will decrease  Outcome: Ongoing  Goal: Control of acute pain  Description: Control of acute pain  Outcome: Ongoing  Goal: Control of chronic pain  Description: Control of chronic pain  Outcome: Ongoing     Problem: Skin Integrity:  Goal: Will show no infection signs and symptoms  Description: Will show no infection signs and symptoms  Outcome: Ongoing  Goal: Absence of new skin breakdown  Description: Absence of new skin breakdown  Outcome: Ongoing     Problem: Non-Violent Restraints  Goal: Removal from restraints as soon as assessed to be safe  Outcome: Met This Shift  Goal: No harm/injury to patient while restraints in use  Outcome: Met This Shift  Goal: Patient's dignity will be maintained  Outcome: Met This Shift     Problem: HEMODYNAMIC STATUS  Goal: Patient has stable vital signs and fluid balance  Outcome: Ongoing     Problem: ACTIVITY INTOLERANCE/IMPAIRED MOBILITY  Goal: Mobility/activity is maintained at optimum level for patient  Outcome: Ongoing     Problem: COMMUNICATION IMPAIRMENT  Goal: Ability to express needs and understand communication  Outcome: Ongoing     Problem: Nutrition  Goal: Optimal nutrition therapy  3/1/2022 0259 by Coy Montenegro RN  Outcome: Ongoing  2/28/2022 1416 by Leidy Reyes  Outcome: Ongoing  Note: Nutrition Problem #1: Inadequate oral intake  Intervention: Food and/or Nutrient Delivery: Continue Current Diet,Start Oral Nutrition Supplement  Nutritional Goals: Pt will have PO intakes of 50% or greater during stay.

## 2022-03-01 NOTE — PROGRESS NOTES
02/28/22 2106   NIV Type   NIV Started/Stopped On   Equipment Type v60   Mode Bilevel   Mask Type Full face mask   Mask Size Large   Settings/Measurements   IPAP 16 cmH20   CPAP/EPAP 10 cmH2O   Rate Ordered 12   FiO2  30 %   I Time/ I Time % 1 s   Vt Exhaled 625 mL   Mask Leak (lpm) 61 lpm   Comfort Level Good

## 2022-03-01 NOTE — PROGRESS NOTES
[facility] GENERAL SURGERY    Surgery Progress Note        PO    PATIENT NAME: Maurice Palomino     TODAY'S DATE: 3/1/2022    INTERVAL HISTORY:    Pt  With mild pain. OBJECTIVE:   VITALS:  BP (!) 129/59   Pulse 73   Temp 97.8 °F (36.6 °C) (Oral)   Resp 18   Ht 5' 9\" (1.753 m)   Wt 253 lb 15.5 oz (115.2 kg)   SpO2 96%   BMI 37.50 kg/m²     INTAKE/OUTPUT:    I/O last 3 completed shifts: In: 240 [P.O.:240]  Out: -   No intake/output data recorded. CONSTITUTIONAL:  awake and alert  Perineum:  Less pain, no erythema or purulence, wound healing    Data:  CBC: Recent Labs     02/27/22  0729 02/28/22  0751 03/01/22  0732   WBC 14.0* 14.5* 13.6*   HGB 7.9* 8.2* 8.2*   HCT 25.0* 25.1* 24.9*    435 436     BMP:    Recent Labs     02/27/22  0729 02/28/22  0751 03/01/22  0732   * 131* 137   K 4.3 4.8 4.6   CL 93* 90* 97*   CO2 21 22 20*   BUN 53* 66* 39*   CREATININE 7.8* 8.8* 5.9*   GLUCOSE 106* 139* 125*     Hepatic:   Recent Labs     02/27/22  0729 02/28/22  0751 03/01/22  0732   AST 12* 7* 10*   ALT 7* 6* 7*   BILITOT <0.2 <0.2 <0.2   ALKPHOS 105 102 103     Mag:    No results for input(s): MG in the last 72 hours. Phos:   No results for input(s): PHOS in the last 72 hours. INR: No results for input(s): INR in the last 72 hours. Radiology Review:  NA    ASSESSMENT AND PLAN:  46 y.o. male status post I&D of perineal cyst  1. Healing as expected  2. No signs of infection  3.   Will sign off at this time         Electronically signed by Jesus Matt MD

## 2022-03-01 NOTE — PROGRESS NOTES
[facility] Progress Note           Subjective/   46y.o. year old male who we are seeing in consultation for ESKD on HD. HPI:  Last HD on 2/28 with 1.6 liters removed, post-weight of 112.2 kg. Denies any shortness of breath. ROS:  +weak, intake reduced. Objective/   GEN:  Chronically ill, BP (!) 129/59   Pulse 73   Temp 97.8 °F (36.6 °C) (Oral)   Resp 18   Ht 5' 9\" (1.753 m)   Wt 253 lb 15.5 oz (115.2 kg)   SpO2 96%   BMI 37.50 kg/m²   HEENT: non-icteric, no JVD  CV: S1, S2 without m/r/g; no LE edema  RESP: CTA B without w/r/r; breathing wnl  ABD: +bs, soft, nt, no hsm  SKIN: warm, no rashes  ACCESS: Left IJ [facility]    Data/  Recent Labs     02/27/22  0729 02/28/22  0751 03/01/22  0732   WBC 14.0* 14.5* 13.6*   HGB 7.9* 8.2* 8.2*   HCT 25.0* 25.1* 24.9*   MCV 89.9 88.9 89.4    435 436     Recent Labs     02/27/22  0729 02/28/22  0751 03/01/22  0732   * 131* 137   K 4.3 4.8 4.6   CL 93* 90* 97*   CO2 21 22 20*   GLUCOSE 106* 139* 125*   BUN 53* 66* 39*   CREATININE 7.8* 8.8* 5.9*   LABGLOM 7* 6* 10*   GFRAA 9* 8* 12*       Assessment/     ESKD:  On dialysis MWF at [facility].  Failed PD.  Not transplant candidate.  Had fistula ligated due to dialysis associated steal.  He has not reached his target of 116 kg in over 1 month. Suspect bed weight inaccuracies here.     Anemia of chronic disease-CKD:  - Hb is below target, on DAVON at the dialysis unit     Hyponatremia:  Due to volume overload in setting of ESRD     Hyperkalemia:  Better with HD     Perineal Pain:  Surgery following and s/p I&D. On broad spectrum IV antibiotics and pain medications    Plan/     - HD tomorrow - UF target as tolerated with crit line monitoring  - Dosing DAVON with HD  - Trend labs, bp's     Palliative care input appreciated  Overall prognosis poor    ____________________________________  Kandy Soto MD  [facility]  [email or web address]  Office: [phone]

## 2022-03-01 NOTE — PROGRESS NOTES
[facility] Pt had restraint orders but was never placed on patient. Sitter at bedside, no needs for restraints at this time.

## 2022-03-01 NOTE — PROGRESS NOTES
Physical Therapy    Facility/Department: [facility] - MED SURG  Initial Assessment    NAME: Chuy Davis  : 1970  MRN: [id number]    Date of Service: 3/1/2022    Discharge Recommendations:  24 hour supervision or assist,Home with Home health PT   PT Equipment Recommendations  Equipment Needed: No  If pt discharges prior to next PT session this note will serve as discharge summary. Assessment   Body structures, Functions, Activity limitations: Decreased functional mobility ; Decreased endurance;Decreased safe awareness; Increased pain  Assessment: 45 yo male adm with acute/subacute thalamic infarct. PMH includes CAD, CHF, COPD, DM, on dialysis, OA, chronic pain, blood clots, EF 43%, prior CVA with L side weakness. PLOF: pt reports he lives with wife in single level home with ramped entrance. He reports he is sedentary spending most of his time in his hospital bed and needs assist from wife for ADLs and short distance amb with RW. AM PAC score of 14 indicates pt will benefit from skilled PT to address deficits. Recommend Home with 24 hr assist and home PT  Treatment Diagnosis: weakness, decreased mobility and endurance  Prognosis: Good  Decision Making: Medium Complexity  PT Education: Goals;PT Role;Plan of Care;General Safety; Disease Specific Education;Gait Training;Energy Conservation;Transfer Training;Functional Mobility Training  Patient Education: Disease specific: pt educated in repositioning to ease back pain, importance of mobiltiy and ther ex to prevent complications of bedrest. He voices understanding  REQUIRES PT FOLLOW UP: Yes  Activity Tolerance  Activity Tolerance: /35  in sitting  HR 74  SpO2 97% on supplemental O2 via nc       Patient Diagnosis(es): The primary encounter diagnosis was Sepsis without acute organ dysfunction, due to unspecified organism ([facility] [address] [address]  Diagnoses of [place]-rectal abscess, Shortness of breath, ESRD on hemodialysis ([facility] [address] [facility] Elevated brain natriuretic peptide (BNP) level, and Type 2 diabetes mellitus with other skin complication, with long-term current use of insulin ([address] were also pertinent to this visit. has a past medical history of Ambulatory dysfunction, Aortic stenosis, Arthritis, Asthma, Bilateral hilar adenopathy syndrome, CAD (coronary artery disease), Cardiomyopathy ([address] CHF (congestive heart failure) ([address] Chronic pain, COPD (chronic obstructive pulmonary disease) ([address] Depression, Diabetes mellitus ([address] Difficult intravenous access, Emphysema of lung ([address] ESRD (end stage renal disease) on dialysis ([address] Fear of needles, Gastric ulcer, GERD (gastroesophageal reflux disease), Heart valve problem, Hemodialysis patient ([address] History of spinal fracture, Hx of blood clots, Hyperlipidemia, Hypertension, MI (myocardial infarction) ([address] Neuromuscular disorder ([address] Numbness and tingling in left arm, Pneumonia, PONV (postoperative nausea and vomiting), Prolonged emergence from general anesthesia, Sleep apnea, Stroke ([address] TIA (transient ischemic attack), and Unspecified diseases of blood and blood-forming organs. has a past surgical history that includes Tonsillectomy; cyst removal (08/14/2013); Colonoscopy; Coronary angioplasty with stent (05/26/15); vascular surgery (aprx 2 years ago); Colonoscopy (2/29/2015); Upper gastrointestinal endoscopy (01/06/2016); Upper gastrointestinal endoscopy (01/29/2017); other surgical history (02/01/2017); Dialysis fistula creation (Left, 10/30/2017); Diagnostic Cardiac Cath Lab Procedure; other surgical history (2018); other surgical history (Bilateral, 06/26/2018); pr lap insertion tunneled intraperitoneal catheter (N/A, 9/21/2018); other surgical history (Right, 09/2018); Dialysis fistula creation (Left, 3/27/2019); other surgical history (05/28/2019); Endoscopy, colon, diagnostic; Catheter Removal (Right, 7/2/2019);  Aortic valve replacement (N/A, 10/15/2019); and Rectal surgery (N/A, 2/24/2022). Restrictions  Restrictions/Precautions: General Precautions,Up as Tolerated,Fall Risk  Position Activity Restriction  Other position/activity restrictions: O2, No BP in LUE  Vision/Hearing  Vision: Within Functional Limits  Hearing: Within functional limits     Subjective  Chart Reviewed: Yes  Patient assessed for rehabilitation services?: Yes  Family / Caregiver Present:  (sitter present)  Referring Practitioner: Rafy Moseley  Referral Date : 02/28/22  Diagnosis: Acute/early subacute infarct involving the left thalamus and left parietallobe towards the vertex. Follows Commands: Within Functional Limits  Comments: RN cleared pt for therapy, pt resting in bed on approach  Subjective: Pt agrees to therapy  Pain Screening  Patient Currently in Pain: Yes  Pain Assessment  Pain Assessment: 0-10  Pain Level: 10  Pain Type: Acute pain  Pain Location: Back  Pain Orientation: Lower  Pain Descriptors: Discomfort  Pain Frequency: Continuous  Clinical Progression: Not changed  Functional Pain Assessment: Prevents or interferes some active activities and ADLs  Non-Pharmaceutical Pain Intervention(s): Ambulation/Increased Activity; Emotional support;Repositioned  Response to Pain Intervention: None  Vital Signs- see activity tolerance section  Patient Currently in Pain: Yes  Pre Treatment Pain Screening  Comments / Details: Pt requested return to bed and sidelying positioning on his R side    Orientation  Overall Orientation Status: Within Functional Limits  Social/Functional History  Social/Functional History  Lives With: Spouse  Type of Home: House  Home Layout: One level  Home Access: Ramped entrance  Bathroom Shower/Tub: Walk-in shower  Bathroom Toilet: Standard  Home Equipment: Rolling walker,Hospital bed  ADL Assistance: Needs assistance  Ambulation Assistance: Needs assistance (6SS)  Transfer Assistance: Needs assistance  Additional Comments: pt reports frequent falls, stays in bed most of time    Objective RLE AROM: WFL  LLE AROM : WFL  Strength RLE: WFL  Strength LLE: WFL        Bed mobility  Rolling to Left: Modified independent  Rolling to Right: Modified independent  Supine to Sit: Minimal assistance  Sit to Supine: Minimal assistance  Comment: Pt practiced supine to and from sit 3 x wtih HOB elevated, use of bedrails, min assist for trunk and or LEs on various trials. Pt required rest breaks between attempts     Transfers  Sit to Stand: Contact guard assistance  Stand to sit: Contact guard assistance  Bed to Chair: Minimal assistance  Comment: Practiced sit to and from stand from EOB and from chair with arm rests. Ambulation  Surface: level tile  Device: No Device  Assistance: 2 Person assistance;Contact guard assistance  Quality of Gait: short stride, minimal foot clearance, early fatigue. Distance: 3 ft x 2 with seated rest between trials     Plan   Times per week: 3-4 x week  Current Treatment Recommendations: Strengthening,Transfer Training,Endurance Training,Functional Mobility Training,Safety Education & Training,Gait Training,Pain Management  Safety Devices  Type of devices:  All fall risk precautions in place,Bed alarm in place,Patient at risk for falls,Nurse notified,Left in bed,Call light within reach,Sitter present  AM-PAC Score     AM-PAC Inpatient Mobility without Stair Climbing Raw Score : 14 (03/01/22 1037)  AM-PAC Inpatient without Stair Climbing T-Scale Score : 40.85 (03/01/22 1037)  Mobility Inpatient CMS 0-100% Score: 53.33 (03/01/22 1037)  Mobility Inpatient without Stair CMS G-Code Modifier : CK (03/01/22 1037)    Goals  Short term goals  Time Frame for Short term goals: 1week ( 3/8) unless otherwise specified  Short term goal 1: pt to perform bed mob modified indep  Short term goal 2: pt to perform transfers supervised  Short term goal 3: pt to amb wtih RW 50 ft CG  Short term goal 4: pt to participate in LE Ex 5-10 reps by 3/6  Patient Goals   Patient goals : \" to be able to walk to the bathroom at home without help\"     Therapy Time   Individual Concurrent Group Co-treatment   Time In 0957         Time Out 1030         Minutes 33          eval = 10 min, treatment= 23 min     Booker Ramos, PT

## 2022-03-01 NOTE — FLOWSHEET NOTE
3.1.22/Patient's sitter suggested that I return latter due the patient not getting much sleep last night. 03/01/22 1139   Encounter Summary   Services provided to: Patient   Referral/Consult From: [address] [place] [address] Family members; Hospice   Continue Visiting   (3.1.22/Sitter suggested that I come back latter)   Complexity of Encounter Low   Length of Encounter 15 minutes   Spiritual Assessment Completed Yes   Routine   Type Initial   Assessment Unable to respond   Intervention Sustaining presence/ Ministry of presence

## 2022-03-01 NOTE — PROGRESS NOTES
Hospitalist Progress Note      PCP: Ximena Lopes MD    Date of Admission: 2/21/2022      Chief Complaint: perianal pain and dyspnea     Hospital Course: In brief this is a 46 yoM with ESRD and TIIDM presenting with perianal pain concerning for cellulitis and dyspnea likely from volume overload. Hospital course has been complicated by worsening encephalopathy concerning for hypoactive delirium with now acute /subacute thalamic infarct.     Subjective: resting in bed, sitter at bedside, didn't sleep well due to back pain issues       Medications:  Reviewed    Infusion Medications    dextrose      sodium chloride 25 mL (02/28/22 8266)     Scheduled Medications    pantoprazole  40 mg Oral QAM AC    metoprolol succinate  50 mg Oral BID    apixaban  2.5 mg Oral BID    meropenem  500 mg IntraVENous Q24H    epoetin siddharth-epbx  10,000 Units IntraVENous Q MWF    acetaminophen  650 mg Oral 3 times per day    tiotropium-olodaterol  2 puff Inhalation Daily    calcium acetate  667 mg Oral TID WC    clopidogrel  75 mg Oral Daily    DULoxetine  60 mg Oral Daily    [Held by provider] gabapentin  100 mg Oral TID    [Held by provider] isosorbide mononitrate  30 mg Oral Daily    [Held by provider] lisinopril  10 mg Oral Daily    QUEtiapine  50 mg Oral Nightly    torsemide  100 mg Oral Daily    traZODone  150 mg Oral Nightly    linaclotide  145 mcg Oral QAM AC    pravastatin  40 mg Oral Daily    insulin lispro  0-18 Units SubCUTAneous TID WC    insulin lispro  0-9 Units SubCUTAneous Nightly    sodium chloride flush  5-40 mL IntraVENous 2 times per day    vancomycin (VANCOCIN) intermittent dosing (placeholder)   Other RX Placeholder     PRN Meds: oxyCODONE, heparin (porcine), haloperidol lactate, albuterol, prochlorperazine, fentanNYL, oxyCODONE, albuterol sulfate HFA, cyclobenzaprine, glucose, glucagon (rDNA), dextrose, sodium chloride flush, sodium chloride, dextrose bolus (hypoglycemia) **OR** dextrose bolus (hypoglycemia)      Intake/Output Summary (Last 24 hours) at 3/1/2022 1050  Last data filed at 2/28/2022 1553  Gross per 24 hour   Intake 240 ml   Output --   Net 240 ml       Physical Exam Performed:    BP (!) 119/57   Pulse 74   Temp 98.4 °F (36.9 °C) (Oral)   Resp 20   Ht 5' 9\" (1.753 m)   Wt 253 lb 15.5 oz (115.2 kg)   SpO2 97%   BMI 37.50 kg/m²     General appearance: No apparent distress, appears stated age and cooperative. obese  HEENT: Pupils equal, round, and reactive to light. Conjunctivae/corneas clear. Neck: Supple, with full range of motion. No jugular venous distention. Trachea midline. Respiratory:  Normal respiratory effort. Clear to auscultation, bilaterally without Rales/Wheezes/Rhonchi. Cardiovascular: Regular rate and rhythm with normal S1/S2 without murmurs, rubs or gallops. Abdomen: Soft, non-tender, non-distended with normal bowel sounds. Musculoskeletal: No clubbing, cyanosis or edema bilaterally. Full range of motion without deformity. Skin: Skin color, texture, turgor normal.  No rashes or lesions. Neurologic:  Neurovascularly intact without any focal sensory/motor deficits. Cranial nerves: II-XII intact, grossly non-focal.  Psychiatric: Alert and oriented, thought content appropriate, normal insight  Capillary Refill: Brisk,3 seconds, normal   Peripheral Pulses: +2 palpable, equal bilaterally       Labs:   Recent Labs     02/27/22  0729 02/28/22 0751 03/01/22  0732   WBC 14.0* 14.5* 13.6*   HGB 7.9* 8.2* 8.2*   HCT 25.0* 25.1* 24.9*    435 436     Recent Labs     02/27/22  0729 02/28/22  0751 03/01/22  0732   * 131* 137   K 4.3 4.8 4.6   CL 93* 90* 97*   CO2 21 22 20*   BUN 53* 66* 39*   CREATININE 7.8* 8.8* 5.9*   CALCIUM 8.6 8.7 8.7     Recent Labs     02/27/22  0729 02/28/22  0751 03/01/22  0732   AST 12* 7* 10*   ALT 7* 6* 7*   BILITOT <0.2 <0.2 <0.2   ALKPHOS 105 102 103     No results for input(s): INR in the last 72 hours.   No results for input(s): Francisklaie Gomez in the last 72 hours. Urinalysis:      Lab Results   Component Value Date    NITRU Negative 02/22/2022    WBCUA 3-5 02/22/2022    BACTERIA Rare 02/22/2022    RBCUA 0-2 02/22/2022    BLOODU TRACE-INTACT 02/22/2022    SPECGRAV 1.015 02/22/2022    GLUCOSEU 250 02/22/2022       Radiology:  XR CHEST PORTABLE   Final Result   Hypoventilatory changes noted in the left lung base. MRI BRAIN WO CONTRAST   Final Result   1. Acute/early subacute infarct involving the left thalamus and left parietal   lobe towards the vertex. 2. Cerebral parenchymal volume loss with chronic microvascular white matter   ischemic disease. 3. Chronic infarcts are noted in the periventricular white matter, right   thalamus, right layo, and bilateral cerebellar hemispheres. 4. Worsening bilateral mastoid effusions and paranasal sinus disease. CT HEAD WO CONTRAST   Final Result   Mild cerebral atrophy. Remote lacunar infarcts in the basal ganglia and both   thalami. There is a new area of low attenuation in the mid lateral portion of the left   thalamus consistent with an infarct of indeterminate age. MRI is more   sensitive for evaluation of the brain parenchyma if indicated. CT CHEST PULMONARY EMBOLISM W CONTRAST   Final Result   There is a suboptimal contrast bolus, limiting evaluation of the segmental   and subsegmental branches. No central pulmonary embolism is detected. Patchy airspace disease in the lower lungs bilaterally, greater on the left,   most compatible with multifocal pneumonia or aspiration. Small left pleural   effusion has developed. Mildly enlarged mediastinal lymph nodes, similar compared to the previous   exam.  These are most likely reactive, but a follow-up chest CT in   approximately 3 months is recommended to ensure resolution. The small nodule seen previously within the left lower lobe is obscured on   the current examination.   Please refer to the previous exam for   recommendations. XR CHEST PORTABLE   Final Result   No acute process. CT PELVIS WO CONTRAST Additional Contrast? None   Final Result   1. Small 14 mm ovoid fluid collection in the subcutaneous fat of the right   perineum unchanged from at least 01/01/2021. Minimal adjacent subcutaneous   fat stranding. This may represent a sebaceous cyst with superimposed   infection not excluded. 2. No soft tissue gas or other drainable fluid collection. 3. No acute osseous abnormality. XR CHEST PORTABLE   Final Result   Vascular congestion. No consolidation. Stable cardiomegaly. VL DUP CAROTID BILATERAL    (Results Pending)           Assessment/Plan:    Active Hospital Problems    Diagnosis     Acute on chronic combined systolic and diastolic heart failure ([facility]) [I50.43]      Priority: High    Dyslipidemia [E78.5]      Priority: High    Type 2 diabetes, uncontrolled, with neuropathy ([facility]) [E11.40, E11.65]      Priority: High    Coronary artery disease involving native coronary artery of native heart without angina pectoris [I25.10]      Priority: High    HTN (hypertension), benign [I10]      Priority: Medium    Liberty-rectal abscess [K61.1]     Somnolence [R40.0]     Atrial flutter ([facility]) [I48.92]     SIRS (systemic inflammatory response syndrome) ([facility]) [R65.10]     Acute CVA (cerebrovascular accident) ([facility] [I63.9]     PAF (paroxysmal atrial fibrillation) ([facility]) [I48.0]     ESRD (end stage renal disease) ([facility] [N18.6]     Elevated troponin [R77.8]     Obesity (BMI 30-39. 9) [E66.9]     Sepsis without acute organ dysfunction ([facility]) [A41.9]            Encephalopathy secondary to Acute/subacute thalamic CVA- encephalopathy improved.   -CT head and MRI with left thalamic and left parietal lobe, also noted chronic infarcts in periventricular white matter, right thalamus, layo and bilateral hemispheres  -neurology consulted as family would appreciate their input understanding it may not change mgmt, would have them weigh in on pts risk of hemorraghic conversion with increasing apixaban dose to 5mg (HASBLED score 6 but stroke is small and pt started on DOAC >24 hours after presumed incident)  - neuro check, NIHHS ordered, last known normal >24 hours ago  - continued Plavix and Apixaban, statin   - held gabapentin   -palliative care was consulted, rec follow at home    Perianal cellulitis w/ sepsis (SIRS criteira WBC 13.1, )  - CT pelvis revealed: small 14 mm ovoid fluid collection in subcutaneous fat of the right perineum unchanged from atleast 01/01/2021. Minimal adjacent subcutaneous fat stranding. This may represent a sebaceous cyst with superimposed infection. No soft tissue gas or other drainable fluid collection.  -gen surg consulted  -surgical cultures: 1+ GPC cocci, Bld cx NGTD  - abcx can likely complete a 7-10 day course (end 02/28-3/3)  - pain regimen adjusted: acetaminophen scheduled, oxycodone PO for breakthrough pain and IV opioids if PO insufficient.     Leukocytosis- can be reactive in setting of acute stroke versus ongoing infection. Only new focal finding is cough. Per nursing pt had loose stools today but not diarrhea. Pt did not want me to see perianal wound, per nursing it looks very well. [address] On adequate broad spectrum abcx. Hold course for now as pt is clinically improving.  - f/u CXR 2/27(noted hypoventilatory changes in LLL)  - monitor BM      Acute hypoxic respiratory failure- likely volume overload from inadequate iHD based on initial CXR but sxs seem to overnight developed hypoxia (88%) on 2L NC. Initially presentation was dyspnea with out hypoxia. Suspect pt may have now aspirated. Ddimer acutely elevated > 2000 but CTA without PE. Covid rapid negative. - continue iHD with nephrology assistance. - overnight/naps CPAP  - wean oxygen as able     TIIDM- a1c 9.4 on 1/12/2022. Hypoglycemia noted.  Holding basal and continuing SSI     Afib/aflutter with RVR-. CHADSVASC 6.  - cardiology was following, f/u on 3/24 as outpt  - now on DOAC, stopped asa, continued plavix    -continue bb    Essential HTN  -continue metoprolol; lisinopril on hold d/t hypotension recently but can likely restart if hemodynamics remain stable     HLD  -continue pravastatin     Elevated troponin, 0.15-->0.17 on admission  -no c/o chest pain  -likely 2/2 demand ischemia r/t ESRD  -trend troponin flat  -tele monitoring     Acute on chronic CHF, stable  -strict I&O  -daily weights (dry wt 119)  -continued demadex     Chronic normocytic anemia likely from anemia of chronic disease  -no s/s of bleeding at this time     Anxiety depression  -continue home meds  -mood stable at this time     ESRD iHD MWF  - nephrology following     Obesity  With Body mass index is 38.4 kg/m². Complicating assessment and treatment. Placing patient at risk for multiple co-morbidities as well as early death and contributing to the patient's presentation. Counseled on weight loss     DVT Prophylaxis: heparin ppx  Diet: ADULT DIET; Regular; 4 carb choices (60 gm/meal);  Low Sodium (2 gm)  ADULT ORAL NUTRITION SUPPLEMENT; Breakfast, Dinner; Diabetic Oral Supplement  Code Status: Full Code      PT/OT Eval Status:   Ordered 2/28     Family updated 02/26, see ACP note, palliative care consulted hopefully family meeting 02/28     Dispo - pending clinical course, pending palliative care recs, final surgery recs today, pending neuro Marialuisa Mccurdy MD

## 2022-03-01 NOTE — PROGRESS NOTES
Occupational Therapy   Occupational Therapy Initial Assessment and treatment    Date: 3/1/2022   Patient Name: Kaelyn Rob  MRN: [id number]     : 1970    Date of Service: 3/1/2022    Discharge Recommendations:  24 hour supervision or assist       Assessment   Performance deficits / Impairments: Decreased functional mobility ; Decreased ADL status; Decreased strength;Decreased safe awareness;Decreased cognition;Decreased endurance;Decreased balance  Assessment: Pt admitted with SOB, perineal abscess. Pt confused, difficulty processing/following commands, although may be selective command following. Pt unreceptive to education/cues for safety. Pt refuses ADL tasks, reports wife assists \"when I need it\" at baseline, min A for bed mobility and transfers with poor safety. Anticipate pt at or near baseline, as pt reports he stays in bed most of time, sponge bathes and wife assists at baseline. Prognosis: Guarded  OT Education: OT Role;Plan of Care;ADL Adaptive Strategies;Transfer Training  Patient Education: disease specific: ADLs, transfers, importnace of OOB/mobility  Barriers to Learning: cog, unreceptiveness to education  REQUIRES OT FOLLOW UP: Yes  Activity Tolerance  Activity Tolerance: Treatment limited secondary to decreased cognition  Activity Tolerance: 134/35 HR 81 O2 99% (pt moving frequently during BP, anticipate inaccurate reading)  Safety Devices  Safety Devices in place: Yes  Type of devices: Gait belt;Call light within reach; Left in bed;Nurse notified (avasys and sitter)           Patient Diagnosis(es): The primary encounter diagnosis was Sepsis without acute organ dysfunction, due to unspecified organism ([address] Diagnoses of Liberty-rectal abscess, Shortness of breath, ESRD on hemodialysis (HCC), Elevated brain natriuretic peptide (BNP) level, and Type 2 diabetes mellitus with other skin complication, with long-term current use of insulin ([address] were also pertinent to this visit.      has a past medical history of Ambulatory dysfunction, Aortic stenosis, Arthritis, Asthma, Bilateral hilar adenopathy syndrome, CAD (coronary artery disease), Cardiomyopathy ([address] CHF (congestive heart failure) ([address] Chronic pain, COPD (chronic obstructive pulmonary disease) ([address] Depression, Diabetes mellitus ([address] Difficult intravenous access, Emphysema of lung ([address] ESRD (end stage renal disease) on dialysis ([address] Fear of needles, Gastric ulcer, GERD (gastroesophageal reflux disease), Heart valve problem, Hemodialysis patient ([address] History of spinal fracture, Hx of blood clots, Hyperlipidemia, Hypertension, MI (myocardial infarction) ([address] Neuromuscular disorder ([address] Numbness and tingling in left arm, Pneumonia, PONV (postoperative nausea and vomiting), Prolonged emergence from general anesthesia, Sleep apnea, Stroke ([address] TIA (transient ischemic attack), and Unspecified diseases of blood and blood-forming organs. has a past surgical history that includes Tonsillectomy; cyst removal (08/14/2013); Colonoscopy; Coronary angioplasty with stent (05/26/15); vascular surgery (aprx 2 years ago); Colonoscopy (2/29/2015); Upper gastrointestinal endoscopy (01/06/2016); Upper gastrointestinal endoscopy (01/29/2017); other surgical history (02/01/2017); Dialysis fistula creation (Left, 10/30/2017); Diagnostic Cardiac Cath Lab Procedure; other surgical history (2018); other surgical history (Bilateral, 06/26/2018); pr lap insertion tunneled intraperitoneal catheter (N/A, 9/21/2018); other surgical history (Right, 09/2018); Dialysis fistula creation (Left, 3/27/2019); other surgical history (05/28/2019); Endoscopy, colon, diagnostic; Catheter Removal (Right, 7/2/2019); Aortic valve replacement (N/A, 10/15/2019); and Rectal surgery (N/A, 2/24/2022).            Restrictions  Restrictions/Precautions  Restrictions/Precautions: General Precautions,Up as Tolerated,Fall Risk  Position Activity Restriction  Other position/activity restrictions: O2, No BP in LUE    Subjective   General  Patient assessed for rehabilitation services?: Yes    Social/Functional History  Social/Functional History  Lives With: Spouse  Type of Home: House  Home Layout: One level  Home Access: Ramped entrance  Bathroom Shower/Tub: Walk-in shower  Bathroom Toilet: Standard  Home Equipment: Rolling walker,Hospital bed  ADL Assistance: Needs assistance  Ambulation Assistance: Needs assistance (6ZN)  Transfer Assistance: Needs assistance  Additional Comments: pt reports frequent falls, stays in bed most of time       Objective        Orientation  Overall Orientation Status: Impaired  Orientation Level: Oriented to person     Balance  Sitting Balance: Supervision  Standing Balance: Minimal assistance  Standing Balance  Time: <30 sec x2  Activity: taking a few steps to chair and back to bed after seated rest in chair  Functional Mobility  Functional - Mobility Device: No device  Functional Mobility Comments: CGA/min A x 2, pt impulsive, poor safety, overall flexed posture and reaching for objects  ADL  Feeding: Setup  Additional Comments: refused ADL tasks  Tone RUE  RUE Tone: Normotonic  Tone LUE  LUE Tone: Normotonic  Coordination  Movements Are Fluid And Coordinated: Yes        Transfers  Sit to stand: Minimal assistance  Stand to sit: Minimal assistance     Cognition  Overall Cognitive Status: Exceptions  Arousal/Alertness: Delayed responses to stimuli  Following Commands: Follows one step commands with repetition; Follows one step commands with increased time  Attention Span: Attends with cues to redirect  Memory: Decreased short term memory  Safety Judgement: Decreased awareness of need for safety  Problem Solving: Decreased awareness of errors  Insights: Decreased awareness of deficits  Initiation: Requires cues for some  Sequencing: Requires cues for some  Cognition Comment: pt paranoid, appears easily agitated, minimally participative, does not listen to cues for safety, does what he wants                 LUE AROM (degrees)  LUE AROM : WFL  Left Hand AROM (degrees)  Left Hand AROM: WFL  RUE AROM (degrees)  RUE AROM : WFL  Right Hand AROM (degrees)  Right Hand AROM: WFL  LUE Strength  Gross LUE Strength: WFL  RUE Strength  Gross RUE Strength: WFL                   Plan   Plan  Times per week: 2-3x    AM-PAC Score        AM-PAC Inpatient Daily Activity Raw Score: 13 (03/01/22 1449)  AM-PAC Inpatient ADL T-Scale Score : 32.03 (03/01/22 1449)  ADL Inpatient CMS 0-100% Score: 63.03 (03/01/22 1449)  ADL Inpatient CMS G-Code Modifier : CL (03/01/22 1449)    Goals  Short term goals  Time Frame for Short term goals: 1 week by 3/7  Short term goal 1: Pt will complete LB ADLs with min A  Short term goal 2: Pt will complete transfers with CGA  Short term goal 3: Pt will complete toileting with CGA  Short term goal 4: Pt will tolerate B UE ex x 15 reps w/o fatigue  Patient Goals   Patient goals : \"go home\"       Therapy Time   Individual Concurrent Group Co-treatment   Time In 0957         Time Out 1030         Minutes 33         Timed Code Treatment Minutes: 23 Minutes (10 min eval)   If pt discharges prior to next session, this note will serve as discharge summary. See case management note for discharge disposition.         Cuauhtemoc Trevino OT

## 2022-03-02 LAB
A/G RATIO: 1.1 (ref 1.1–2.2)
ALBUMIN SERPL-MCNC: 3.4 G/DL (ref 3.4–5)
ALP BLD-CCNC: 106 U/L (ref 40–129)
ALT SERPL-CCNC: 7 U/L (ref 10–40)
ANION GAP SERPL CALCULATED.3IONS-SCNC: 17 MMOL/L (ref 3–16)
AST SERPL-CCNC: 10 U/L (ref 15–37)
BANDED NEUTROPHILS RELATIVE PERCENT: 15 % (ref 0–7)
BASOPHILS ABSOLUTE: 0 K/UL (ref 0–0.2)
BASOPHILS RELATIVE PERCENT: 0 %
BILIRUB SERPL-MCNC: <0.2 MG/DL (ref 0–1)
BUN BLDV-MCNC: 51 MG/DL (ref 7–20)
CALCIUM SERPL-MCNC: 8.9 MG/DL (ref 8.3–10.6)
CHLORIDE BLD-SCNC: 96 MMOL/L (ref 99–110)
CO2: 23 MMOL/L (ref 21–32)
CREAT SERPL-MCNC: 7.7 MG/DL (ref 0.9–1.3)
EOSINOPHILS ABSOLUTE: 0.7 K/UL (ref 0–0.6)
EOSINOPHILS RELATIVE PERCENT: 5 %
GFR AFRICAN AMERICAN: 9
GFR NON-AFRICAN AMERICAN: 7
GLUCOSE BLD-MCNC: 114 MG/DL (ref 70–99)
GLUCOSE BLD-MCNC: 128 MG/DL (ref 70–99)
GLUCOSE BLD-MCNC: 137 MG/DL (ref 70–99)
GLUCOSE BLD-MCNC: 141 MG/DL (ref 70–99)
GLUCOSE BLD-MCNC: 147 MG/DL (ref 70–99)
GLUCOSE BLD-MCNC: 153 MG/DL (ref 70–99)
HCT VFR BLD CALC: 27.4 % (ref 40.5–52.5)
HEMOGLOBIN: 8.7 G/DL (ref 13.5–17.5)
LYMPHOCYTES ABSOLUTE: 0.4 K/UL (ref 1–5.1)
LYMPHOCYTES RELATIVE PERCENT: 3 %
MCH RBC QN AUTO: 29 PG (ref 26–34)
MCHC RBC AUTO-ENTMCNC: 31.8 G/DL (ref 31–36)
MCV RBC AUTO: 91.1 FL (ref 80–100)
METAMYELOCYTES RELATIVE PERCENT: 3 %
MONOCYTES ABSOLUTE: 1.3 K/UL (ref 0–1.3)
MONOCYTES RELATIVE PERCENT: 10 %
MYELOCYTE PERCENT: 1 %
NEUTROPHILS ABSOLUTE: 10.9 K/UL (ref 1.7–7.7)
NEUTROPHILS RELATIVE PERCENT: 63 %
PDW BLD-RTO: 16.6 % (ref 12.4–15.4)
PERFORMED ON: ABNORMAL
PLATELET # BLD: 476 K/UL (ref 135–450)
PLATELET SLIDE REVIEW: ADEQUATE
PMV BLD AUTO: 7.4 FL (ref 5–10.5)
POLYCHROMASIA: ABNORMAL
POTASSIUM REFLEX MAGNESIUM: 4.7 MMOL/L (ref 3.5–5.1)
RBC # BLD: 3.01 M/UL (ref 4.2–5.9)
SLIDE REVIEW: ABNORMAL
SODIUM BLD-SCNC: 136 MMOL/L (ref 136–145)
TOTAL PROTEIN: 6.4 G/DL (ref 6.4–8.2)
VANCOMYCIN RANDOM: 21.2 UG/ML
WBC # BLD: 13.3 K/UL (ref 4–11)

## 2022-03-02 PROCEDURE — 6370000000 HC RX 637 (ALT 250 FOR IP): Performed by: NURSE PRACTITIONER

## 2022-03-02 PROCEDURE — 80202 ASSAY OF VANCOMYCIN: CPT

## 2022-03-02 PROCEDURE — 6370000000 HC RX 637 (ALT 250 FOR IP): Performed by: SURGERY

## 2022-03-02 PROCEDURE — 2580000003 HC RX 258: Performed by: INTERNAL MEDICINE

## 2022-03-02 PROCEDURE — 2500000003 HC RX 250 WO HCPCS: Performed by: SURGERY

## 2022-03-02 PROCEDURE — 6370000000 HC RX 637 (ALT 250 FOR IP): Performed by: STUDENT IN AN ORGANIZED HEALTH CARE EDUCATION/TRAINING PROGRAM

## 2022-03-02 PROCEDURE — 6360000002 HC RX W HCPCS

## 2022-03-02 PROCEDURE — 99232 SBSQ HOSP IP/OBS MODERATE 35: CPT | Performed by: NURSE PRACTITIONER

## 2022-03-02 PROCEDURE — 94660 CPAP INITIATION&MGMT: CPT

## 2022-03-02 PROCEDURE — 6360000002 HC RX W HCPCS: Performed by: STUDENT IN AN ORGANIZED HEALTH CARE EDUCATION/TRAINING PROGRAM

## 2022-03-02 PROCEDURE — 97530 THERAPEUTIC ACTIVITIES: CPT

## 2022-03-02 PROCEDURE — 6360000002 HC RX W HCPCS: Performed by: SURGERY

## 2022-03-02 PROCEDURE — 97116 GAIT TRAINING THERAPY: CPT

## 2022-03-02 PROCEDURE — 85025 COMPLETE CBC W/AUTO DIFF WBC: CPT

## 2022-03-02 PROCEDURE — 2580000003 HC RX 258: Performed by: SURGERY

## 2022-03-02 PROCEDURE — 80053 COMPREHEN METABOLIC PANEL: CPT

## 2022-03-02 PROCEDURE — 1200000000 HC SEMI PRIVATE

## 2022-03-02 PROCEDURE — 2580000003 HC RX 258: Performed by: STUDENT IN AN ORGANIZED HEALTH CARE EDUCATION/TRAINING PROGRAM

## 2022-03-02 PROCEDURE — 6370000000 HC RX 637 (ALT 250 FOR IP): Performed by: INTERNAL MEDICINE

## 2022-03-02 PROCEDURE — 94761 N-INVAS EAR/PLS OXIMETRY MLT: CPT

## 2022-03-02 PROCEDURE — 6360000002 HC RX W HCPCS: Performed by: INTERNAL MEDICINE

## 2022-03-02 PROCEDURE — 2700000000 HC OXYGEN THERAPY PER DAY

## 2022-03-02 PROCEDURE — 90935 HEMODIALYSIS ONE EVALUATION: CPT

## 2022-03-02 PROCEDURE — 36415 COLL VENOUS BLD VENIPUNCTURE: CPT

## 2022-03-02 PROCEDURE — 97110 THERAPEUTIC EXERCISES: CPT

## 2022-03-02 RX ORDER — DEXTROSE MONOHYDRATE 50 MG/ML
INJECTION, SOLUTION INTRAVENOUS
Status: DISPENSED
Start: 2022-03-02 | End: 2022-03-03

## 2022-03-02 RX ORDER — HEPARIN SODIUM 1000 [USP'U]/ML
INJECTION, SOLUTION INTRAVENOUS; SUBCUTANEOUS
Status: COMPLETED
Start: 2022-03-02 | End: 2022-03-02

## 2022-03-02 RX ADMIN — ACETAMINOPHEN 650 MG: 325 TABLET ORAL at 22:24

## 2022-03-02 RX ADMIN — TORSEMIDE 100 MG: 100 TABLET ORAL at 11:37

## 2022-03-02 RX ADMIN — QUETIAPINE FUMARATE 50 MG: 25 TABLET ORAL at 20:25

## 2022-03-02 RX ADMIN — MEROPENEM 500 MG: 500 INJECTION, POWDER, FOR SOLUTION INTRAVENOUS at 15:13

## 2022-03-02 RX ADMIN — APIXABAN 2.5 MG: 2.5 TABLET, FILM COATED ORAL at 11:37

## 2022-03-02 RX ADMIN — EPOETIN ALFA-EPBX 10000 UNITS: 10000 INJECTION, SOLUTION INTRAVENOUS; SUBCUTANEOUS at 09:56

## 2022-03-02 RX ADMIN — OXYCODONE 10 MG: 5 TABLET ORAL at 11:37

## 2022-03-02 RX ADMIN — PRAVASTATIN SODIUM 40 MG: 40 TABLET ORAL at 11:38

## 2022-03-02 RX ADMIN — PROCHLORPERAZINE EDISYLATE 10 MG: 5 INJECTION INTRAMUSCULAR; INTRAVENOUS at 18:22

## 2022-03-02 RX ADMIN — OXYCODONE 10 MG: 5 TABLET ORAL at 05:23

## 2022-03-02 RX ADMIN — CYCLOBENZAPRINE 5 MG: 10 TABLET, FILM COATED ORAL at 06:05

## 2022-03-02 RX ADMIN — CALCIUM ACETATE 667 MG: 667 CAPSULE ORAL at 18:22

## 2022-03-02 RX ADMIN — HEPARIN SODIUM 4700 UNITS: 1000 INJECTION INTRAVENOUS; SUBCUTANEOUS at 09:57

## 2022-03-02 RX ADMIN — CLOPIDOGREL BISULFATE 75 MG: 75 TABLET ORAL at 11:37

## 2022-03-02 RX ADMIN — PANTOPRAZOLE SODIUM 40 MG: 40 TABLET, DELAYED RELEASE ORAL at 05:15

## 2022-03-02 RX ADMIN — TRAZODONE HYDROCHLORIDE 150 MG: 50 TABLET ORAL at 20:25

## 2022-03-02 RX ADMIN — OXYCODONE 10 MG: 5 TABLET ORAL at 22:25

## 2022-03-02 RX ADMIN — CALCIUM ACETATE 667 MG: 667 CAPSULE ORAL at 11:36

## 2022-03-02 RX ADMIN — OXYCODONE 10 MG: 5 TABLET ORAL at 18:22

## 2022-03-02 RX ADMIN — ACETAMINOPHEN 650 MG: 325 TABLET ORAL at 05:15

## 2022-03-02 RX ADMIN — DULOXETINE HYDROCHLORIDE 60 MG: 60 CAPSULE, DELAYED RELEASE ORAL at 11:37

## 2022-03-02 RX ADMIN — CYCLOBENZAPRINE 5 MG: 10 TABLET, FILM COATED ORAL at 11:38

## 2022-03-02 RX ADMIN — METOPROLOL SUCCINATE 50 MG: 50 TABLET, EXTENDED RELEASE ORAL at 20:25

## 2022-03-02 RX ADMIN — APIXABAN 5 MG: 5 TABLET, FILM COATED ORAL at 20:25

## 2022-03-02 RX ADMIN — VANCOMYCIN HYDROCHLORIDE 750 MG: 750 INJECTION, POWDER, LYOPHILIZED, FOR SOLUTION INTRAVENOUS at 12:01

## 2022-03-02 RX ADMIN — CYCLOBENZAPRINE 5 MG: 10 TABLET, FILM COATED ORAL at 20:25

## 2022-03-02 RX ADMIN — SODIUM CHLORIDE, PRESERVATIVE FREE 10 ML: 5 INJECTION INTRAVENOUS at 11:39

## 2022-03-02 ASSESSMENT — PAIN SCALES - GENERAL
PAINLEVEL_OUTOF10: 7
PAINLEVEL_OUTOF10: 10
PAINLEVEL_OUTOF10: 10
PAINLEVEL_OUTOF10: 7
PAINLEVEL_OUTOF10: 8
PAINLEVEL_OUTOF10: 8
PAINLEVEL_OUTOF10: 9
PAINLEVEL_OUTOF10: 10
PAINLEVEL_OUTOF10: 7
PAINLEVEL_OUTOF10: 8
PAINLEVEL_OUTOF10: 10

## 2022-03-02 ASSESSMENT — ENCOUNTER SYMPTOMS
COUGH: 0
ABDOMINAL DISTENTION: 1
BACK PAIN: 1
ABDOMINAL PAIN: 0
TROUBLE SWALLOWING: 0
SHORTNESS OF BREATH: 0
EYES NEGATIVE: 1
SORE THROAT: 0
NAUSEA: 0
CHEST TIGHTNESS: 0
VOMITING: 0

## 2022-03-02 ASSESSMENT — PAIN SCALES - WONG BAKER
WONGBAKER_NUMERICALRESPONSE: 4
WONGBAKER_NUMERICALRESPONSE: 4

## 2022-03-02 ASSESSMENT — PAIN DESCRIPTION - LOCATION
LOCATION: BACK
LOCATION: BACK
LOCATION: BACK;BUTTOCKS
LOCATION: BACK;BUTTOCKS

## 2022-03-02 ASSESSMENT — PAIN DESCRIPTION - DESCRIPTORS
DESCRIPTORS: SHARP;STABBING
DESCRIPTORS: STABBING
DESCRIPTORS: STABBING;SHARP

## 2022-03-02 ASSESSMENT — PAIN DESCRIPTION - PAIN TYPE
TYPE: ACUTE PAIN;CHRONIC PAIN

## 2022-03-02 ASSESSMENT — PAIN DESCRIPTION - ORIENTATION
ORIENTATION: LOWER

## 2022-03-02 NOTE — PROGRESS NOTES
Pt resting in bed. Vss. Remains confused to place. Pt currently on BIPAP and has been since around 2130. Sitter to remain at bedside. C/o back pain 8/10, PRN pain medication given with some effectiveness. No s/s of distress at this time.

## 2022-03-02 NOTE — PROGRESS NOTES
Occupational Therapy  Facility/Department: [facility] B3 - MED SURG  Daily Treatment Note  NAME: Margareth Morrell  : 1970  MRN: [id number]    Date of Service: 3/2/2022    Discharge Recommendations:  24 hour supervision or assist       Assessment   Performance deficits / Impairments: Decreased functional mobility ; Decreased ADL status; Decreased strength;Decreased safe awareness;Decreased cognition;Decreased endurance;Decreased balance  Assessment: Pt seen for cotx with PT d/t assist level, impulsivity, agitation and to promote better outcomes than 1:1 session. Pt with limited active participation in UE ex, refuses ADLs, self-limiting with sitting and mobility. Pt reports he feels he is at baseline, anticipate pt with sedentary lifestyle at baseline. OT Education: OT Role;Plan of Care;ADL Adaptive Strategies;Transfer Training  Patient Education: disease specific: ADLs, transfers, importnace of OOB/mobility  Barriers to Learning: cog, unreceptiveness to education  REQUIRES OT FOLLOW UP: Yes  Activity Tolerance  Activity Tolerance: Treatment limited secondary to decreased cognition  Activity Tolerance: 102/448 HR 72 O2 99% on 2L  Safety Devices  Safety Devices in place: Yes  Type of devices: Gait belt;Call light within reach; Left in bed;Nurse notified (sitter and avasys)         Patient Diagnosis(es): The primary encounter diagnosis was Sepsis without acute organ dysfunction, due to unspecified organism ([address] Diagnoses of Liberty-rectal abscess, Shortness of breath, ESRD on hemodialysis (HCC), Elevated brain natriuretic peptide (BNP) level, and Type 2 diabetes mellitus with other skin complication, with long-term current use of insulin ([address] were also pertinent to this visit.       has a past medical history of Ambulatory dysfunction, Aortic stenosis, Arthritis, Asthma, Bilateral hilar adenopathy syndrome, CAD (coronary artery disease), Cardiomyopathy ([address] CHF (congestive heart failure) ([address] Chronic pain, COPD (chronic obstructive pulmonary disease) ([address] Depression, Diabetes mellitus ([address] Difficult intravenous access, Emphysema of lung ([address] ESRD (end stage renal disease) on dialysis ([address] Fear of needles, Gastric ulcer, GERD (gastroesophageal reflux disease), Heart valve problem, Hemodialysis patient ([address] History of spinal fracture, Hx of blood clots, Hyperlipidemia, Hypertension, MI (myocardial infarction) ([address] Neuromuscular disorder ([address] Numbness and tingling in left arm, Pneumonia, PONV (postoperative nausea and vomiting), Prolonged emergence from general anesthesia, Sleep apnea, Stroke ([address] TIA (transient ischemic attack), and Unspecified diseases of blood and blood-forming organs. has a past surgical history that includes Tonsillectomy; cyst removal (08/14/2013); Colonoscopy; Coronary angioplasty with stent (05/26/15); vascular surgery (aprx 2 years ago); Colonoscopy (2/29/2015); Upper gastrointestinal endoscopy (01/06/2016); Upper gastrointestinal endoscopy (01/29/2017); other surgical history (02/01/2017); Dialysis fistula creation (Left, 10/30/2017); Diagnostic Cardiac Cath Lab Procedure; other surgical history (2018); other surgical history (Bilateral, 06/26/2018); pr lap insertion tunneled intraperitoneal catheter (N/A, 9/21/2018); other surgical history (Right, 09/2018); Dialysis fistula creation (Left, 3/27/2019); other surgical history (05/28/2019); Endoscopy, colon, diagnostic; Catheter Removal (Right, 7/2/2019); Aortic valve replacement (N/A, 10/15/2019); and Rectal surgery (N/A, 2/24/2022).     Restrictions  Restrictions/Precautions  Restrictions/Precautions: General Precautions,Up as Tolerated,Fall Risk  Position Activity Restriction  Other position/activity restrictions: 2L O2, No BP in LUE  Subjective   General  Chart Reviewed: Yes  Patient assessed for rehabilitation services?: Yes  Family / Caregiver Present: No  Subjective  Subjective: Pt supine, agreeable with encouragement  General Comment  Comments: RN clears for therapy  Pain Assessment  Guajardo-Rojas Pain Rating: Hurts little more  Pain Type: Acute pain;Chronic pain  Pain Location: Back;Buttocks  Pain Orientation: Lower  Pain Descriptors: Stabbing; Sharp  Pre Treatment Pain Screening  Intervention List: Patient able to continue with treatment  Vital Signs  Patient Currently in Pain: Yes   Orientation  Orientation  Overall Orientation Status: Impaired  Orientation Level: Oriented to person  Objective    ADL  Feeding: Setup  LE Dressing: Dependent/Total  Additional Comments: refused ADL tasks        Balance  Sitting Balance: Supervision  Standing Balance: Minimal assistance  Standing Balance  Time: 30-45 sec  Activity: short functional mobility in room with RW and assist  Functional Mobility  Functional - Mobility Device: Rolling Walker  Activity: Other  Functional Mobility Comments: CGA/min A x 2 d/t impulsivity, need for balance and to manage RW     Transfers  Sit to stand: Minimal assistance  Stand to sit: Minimal assistance                       Cognition  Overall Cognitive Status: Exceptions  Arousal/Alertness: Delayed responses to stimuli  Following Commands: Follows one step commands with repetition; Follows one step commands with increased time  Attention Span: Attends with cues to redirect  Memory: Decreased short term memory  Safety Judgement: Decreased awareness of need for safety  Problem Solving: Decreased awareness of errors  Insights: Decreased awareness of deficits  Initiation: Requires cues for some  Sequencing: Requires cues for some  Cognition Comment: pt paranoid, appears easily agitated, minimally participative, does not listen to cues for safety, unreceptive to education/cues for safety                    Type of ROM/Therapeutic Exercise  Type of ROM/Therapeutic Exercise: AROM  Exercises  Scapular Protraction: x10  Scapular Retraction: x10  Shoulder Flexion: x10  Finger Flexion: x10  Finger Extension: x10 Plan   Plan  Times per week: 2-3x    AM-PAC Score        AM-PAC Inpatient Daily Activity Raw Score: 13 (03/02/22 1530)  AM-PAC Inpatient ADL T-Scale Score : 32.03 (03/02/22 1530)  ADL Inpatient CMS 0-100% Score: 63.03 (03/02/22 1530)  ADL Inpatient CMS G-Code Modifier : CL (03/02/22 1530)    Goals  Short term goals  Time Frame for Short term goals: 1 week by 3/7  Short term goal 1: Pt will complete LB ADLs with min A-ongoing  Short term goal 2: Pt will complete transfers with CGA-ongoing  Short term goal 3: Pt will complete toileting with CGA-ongoing  Short term goal 4: Pt will tolerate B UE ex x 15 reps w/o fatigue-ongoing  Patient Goals   Patient goals : \"go home\"       Therapy Time   Individual Concurrent Group Co-treatment   Time In 1145         Time Out 1209         Minutes 24         Timed Code Treatment Minutes: 24 Minutes      If pt discharges prior to next session, this note will serve as discharge summary. See case management note for discharge disposition.      Benjamin Marshall, OT

## 2022-03-02 NOTE — PROGRESS NOTES
For patient safety, an AVASYS camera has been placed in patient's room. AVASYS monitoring needed for Confusion and Increased Fall Risk. Writer placed call to monitor observer to verify camera number [id number] and review patient name, reason for monitoring, and contact information for primary RN. Patient notified of use of remote monitoring upon implementation of camera and verbalized understanding.

## 2022-03-02 NOTE — PROGRESS NOTES
[facility] Progress Note           Subjective/   46y.o. year old male who we are seeing in consultation for ESKD on HD. HPI:  Last HD on 2/28 with 1.6 liters removed, post-weight of 112.2 kg. Seen on dialysis. Orders confirmed. Pre-weight at HD today 113.3 kg. Denies any shortness of breath. ROS:  +weak, intake reduced. Objective/   GEN:  Chronically ill, /69   Pulse (!) 8   Temp 98.2 °F (36.8 °C)   Resp 18   Ht 5' 9\" (1.753 m)   Wt 249 lb 12.5 oz (113.3 kg)   SpO2 97%   BMI 36.89 kg/m²   HEENT: non-icteric, no JVD  CV: S1, S2 without m/r/g; no LE edema  RESP: CTA B without w/r/r; breathing wnl  ABD: +bs, soft, nt, no hsm  SKIN: warm, no rashes  ACCESS: Left [facility]    Data/  Recent Labs     02/28/22  0751 03/01/22  0732 03/02/22  0642   WBC 14.5* 13.6* 13.3*   HGB 8.2* 8.2* 8.7*   HCT 25.1* 24.9* 27.4*   MCV 88.9 89.4 91.1    436 476*     Recent Labs     02/28/22  0751 03/01/22  0732 03/02/22  0641   * 137 136   K 4.8 4.6 4.7   CL 90* 97* 96*   CO2 22 20* 23   GLUCOSE 139* 125* 137*   BUN 66* 39* 51*   CREATININE 8.8* 5.9* 7.7*   LABGLOM 6* 10* 7*   GFRAA 8* 12* 9*       Assessment/     ESKD:  On dialysis MWF at [facility].  Failed PD.  Not transplant candidate.  Had fistula ligated due to dialysis associated steal.  He has not reached his target of 116 kg in over 1 month. Doing better here.     Anemia of chronic disease-CKD:  - Hb is below target, on DAVON at the dialysis unit     Hyponatremia:  Due to volume overload in setting of ESRD     Hyperkalemia:  Better with HD     Perineal Pain:  Surgery following and s/p I&D.  On broad spectrum IV antibiotics and pain medications    Plan/     - HD today with ~2 L UF target as tolerated with crit line monitoring  - Dosing DAVON with HD  - Trend labs, bp's     Palliative care input appreciated  Overall prognosis poor    ____________________________________  Sasha Acosta MD  [facility] [address] [place] [address]. [facility]  Office: [phone]

## 2022-03-02 NOTE — PROGRESS NOTES
Hospitalist Progress Note      PCP: Smitha Simpson MD    Date of Admission: 2/21/2022      Chief Complaint: perianal pain and dyspnea     Hospital Course:  In brief this is a 46 yoM with ESRD and TIIDM presenting with perianal pain concerning for cellulitis and dyspnea likely from volume overload. Hospital course has been complicated by worsening encephalopathy concerning for hypoactive delirium with now acute /subacute thalamic infarct.       Subjective: seen in room, appears coherent currently, sitter at bedside       Medications:  Reviewed    Infusion Medications    dextrose      sodium chloride 25 mL (02/28/22 6389)     Scheduled Medications    vancomycin  750 mg IntraVENous Once    pantoprazole  40 mg Oral QAM AC    metoprolol succinate  50 mg Oral BID    apixaban  2.5 mg Oral BID    meropenem  500 mg IntraVENous Q24H    epoetin siddharth-epbx  10,000 Units IntraVENous Q MWF    acetaminophen  650 mg Oral 3 times per day    tiotropium-olodaterol  2 puff Inhalation Daily    calcium acetate  667 mg Oral TID WC    clopidogrel  75 mg Oral Daily    DULoxetine  60 mg Oral Daily    [Held by provider] gabapentin  100 mg Oral TID    [Held by provider] isosorbide mononitrate  30 mg Oral Daily    [Held by provider] lisinopril  10 mg Oral Daily    QUEtiapine  50 mg Oral Nightly    torsemide  100 mg Oral Daily    traZODone  150 mg Oral Nightly    linaclotide  145 mcg Oral QAM AC    pravastatin  40 mg Oral Daily    insulin lispro  0-18 Units SubCUTAneous TID WC    insulin lispro  0-9 Units SubCUTAneous Nightly    sodium chloride flush  5-40 mL IntraVENous 2 times per day    vancomycin (VANCOCIN) intermittent dosing (placeholder)   Other RX Placeholder     PRN Meds: oxyCODONE, heparin (porcine), haloperidol lactate, albuterol, prochlorperazine, fentanNYL, oxyCODONE, albuterol sulfate HFA, cyclobenzaprine, glucose, glucagon (rDNA), dextrose, sodium chloride flush, sodium chloride, dextrose bolus hours. No results for input(s): Chyrel Lions in the last 72 hours. Urinalysis:      Lab Results   Component Value Date    NITRU Negative 02/22/2022    WBCUA 3-5 02/22/2022    BACTERIA Rare 02/22/2022    RBCUA 0-2 02/22/2022    BLOODU TRACE-INTACT 02/22/2022    SPECGRAV 1.015 02/22/2022    GLUCOSEU 250 02/22/2022       Radiology:  VL DUP CAROTID BILATERAL   Final Result      XR CHEST PORTABLE   Final Result   Hypoventilatory changes noted in the left lung base. MRI BRAIN WO CONTRAST   Final Result   1. Acute/early subacute infarct involving the left thalamus and left parietal   lobe towards the vertex. 2. Cerebral parenchymal volume loss with chronic microvascular white matter   ischemic disease. 3. Chronic infarcts are noted in the periventricular white matter, right   thalamus, right layo, and bilateral cerebellar hemispheres. 4. Worsening bilateral mastoid effusions and paranasal sinus disease. CT HEAD WO CONTRAST   Final Result   Mild cerebral atrophy. Remote lacunar infarcts in the basal ganglia and both   thalami. There is a new area of low attenuation in the mid lateral portion of the left   thalamus consistent with an infarct of indeterminate age. MRI is more   sensitive for evaluation of the brain parenchyma if indicated. CT CHEST PULMONARY EMBOLISM W CONTRAST   Final Result   There is a suboptimal contrast bolus, limiting evaluation of the segmental   and subsegmental branches. No central pulmonary embolism is detected. Patchy airspace disease in the lower lungs bilaterally, greater on the left,   most compatible with multifocal pneumonia or aspiration. Small left pleural   effusion has developed. Mildly enlarged mediastinal lymph nodes, similar compared to the previous   exam.  These are most likely reactive, but a follow-up chest CT in   approximately 3 months is recommended to ensure resolution.       The small nodule seen previously within the left lower lobe is obscured on   the current examination. Please refer to the previous exam for   recommendations. XR CHEST PORTABLE   Final Result   No acute process. CT PELVIS WO CONTRAST Additional Contrast? None   Final Result   1. Small 14 mm ovoid fluid collection in the subcutaneous fat of the right   perineum unchanged from at least 01/01/2021. Minimal adjacent subcutaneous   fat stranding. This may represent a sebaceous cyst with superimposed   infection not excluded. 2. No soft tissue gas or other drainable fluid collection. 3. No acute osseous abnormality. XR CHEST PORTABLE   Final Result   Vascular congestion. No consolidation. Stable cardiomegaly. Assessment/Plan:    Active Hospital Problems    Diagnosis     Acute on chronic combined systolic and diastolic heart failure ([facility]) [I50.43]      Priority: High    Dyslipidemia [E78.5]      Priority: High    Type 2 diabetes, uncontrolled, with neuropathy ([facility]) [E11.40, E11.65]      Priority: High    Coronary artery disease involving native coronary artery of native heart without angina pectoris [I25.10]      Priority: High    HTN (hypertension), benign [I10]      Priority: Medium    Liberty-rectal abscess [K61.1]     Somnolence [R40.0]     Atrial flutter ([facility]) [I48.92]     SIRS (systemic inflammatory response syndrome) ([facility]) [R65.10]     Acute CVA (cerebrovascular accident) ([facility] [I63.9]     PAF (paroxysmal atrial fibrillation) ([facility]) [I48.0]     ESRD (end stage renal disease) ([facility] [N18.6]     Elevated troponin [R77.8]     Obesity (BMI 30-39. 9) [E66.9]     Sepsis without acute organ dysfunction ([facility]) [A41.9]               Encephalopathy secondary to Acute/subacute thalamic CVA- encephalopathy improved.   -CT head and MRI with left thalamic and left parietal lobe, also noted chronic infarcts in periventricular white matter, right thalamus, layo and bilateral hemispheres  -neurology consulted as noted. Holding basal and continuing SSI     Afib/aflutter with RVR-. CHADSVASC 6.  - cardiology was following, f/u on 3/24 as outpt  - now on DOAC, stopped asa, continued plavix    -continue bb     Essential HTN  -continue metoprolol; lisinopril on hold d/t hypotension recently but can likely restart if hemodynamics remain stable     HLD  -continue pravastatin     Elevated troponin, 0.15-->0.17 on admission  -no c/o chest pain  -likely 2/2 demand ischemia r/t ESRD  -trend troponin flat  -tele monitoring     Acute on chronic CHF, stable  -strict I&O  -daily weights (dry wt 119)  -continued demadex     Chronic normocytic anemia likely from anemia of chronic disease  -no s/s of bleeding at this time     Anxiety depression  -continue home meds  -mood stable at this time     ESRD iHD MWF  - nephrology following     Obesity  With Body mass index is 38.4 kg/m². Complicating assessment and treatment. Placing patient at risk for multiple co-morbidities as well as early death and contributing to the patient's presentation. Counseled on weight loss     DVT Prophylaxis: heparin ppx  Diet: ADULT DIET; Regular; 4 carb choices (60 gm/meal);  Low Sodium (2 gm)  ADULT ORAL NUTRITION SUPPLEMENT; Breakfast, Dinner; Diabetic Oral Supplement  Code Status: Full Code      PT/OT Eval Status:   Ordered 2/28     Family updated 02/26, see ACP note, Yaw Serna consulted hopefully family meeting 02/28     Dispo - pending clinical course, pending palliative care recs,     Lily Hernandez MD

## 2022-03-02 NOTE — PROGRESS NOTES
Physical Therapy  Facility/Department: [facility] B3 - MED SURG  Daily Treatment Note  NAME: Jazzy Montelongo  : 1970  MRN: [id number]    Date of Service: 3/2/2022    Discharge Recommendations:  24 hour supervision or assist,Home with Home health PT   PT Equipment Recommendations  Equipment Needed: No    Assessment   Body structures, Functions, Activity limitations: Decreased functional mobility ; Decreased endurance;Decreased safe awareness; Increased pain  Assessment: Pt seen as cotx with OT due to high burden of care and unpredictability with mobility. Pt agreeable to limited session today due to c/o \"my butt hurts\". Pt was educated regarding the importance of OOB activity for overall health and to prevent complications. Pt reluctantly participated in ther ex, and ambulated 30 ft with RW and CGA. Bed moiblity is at mod I and transfers with SBA. Cont per POC to address deficits  Treatment Diagnosis: weakness, decreased mobility and endurance  Prognosis: Good  Decision Making: Medium Complexity  PT Education: Goals;PT Role;Plan of Care;General Safety; Disease Specific Education;Gait Training;Energy Conservation;Transfer Training;Functional Mobility Training  Patient Education: Disease specific: pt educated in repositioning to ease back pain, importance of mobiltiy and ther ex to prevent complications of bedrest. He voices understanding  Barriers to Learning: cognition, easily agitated and distractible  REQUIRES PT FOLLOW UP: Yes  Activity Tolerance  Activity Tolerance: Patient limited by cognitive status;Treatment limited secondary to agitation  Activity Tolerance: /48  in semireclined  HR 72  SpO2 99% on supplemental O2 via nc 2L     Patient Diagnosis(es): The primary encounter diagnosis was Sepsis without acute organ dysfunction, due to unspecified organism ([facility] [address]  Diagnoses of [place]-rectal abscess, Shortness of breath, ESRD on hemodialysis (HCC), Elevated brain natriuretic peptide (BNP) level, and Type 2 diabetes mellitus with other skin complication, with long-term current use of insulin ([address] were also pertinent to this visit. has a past medical history of Ambulatory dysfunction, Aortic stenosis, Arthritis, Asthma, Bilateral hilar adenopathy syndrome, CAD (coronary artery disease), Cardiomyopathy ([address] CHF (congestive heart failure) ([address] Chronic pain, COPD (chronic obstructive pulmonary disease) ([address] Depression, Diabetes mellitus ([address] Difficult intravenous access, Emphysema of lung ([address] ESRD (end stage renal disease) on dialysis ([address] Fear of needles, Gastric ulcer, GERD (gastroesophageal reflux disease), Heart valve problem, Hemodialysis patient ([address] History of spinal fracture, Hx of blood clots, Hyperlipidemia, Hypertension, MI (myocardial infarction) ([address] Neuromuscular disorder ([address] Numbness and tingling in left arm, Pneumonia, PONV (postoperative nausea and vomiting), Prolonged emergence from general anesthesia, Sleep apnea, Stroke ([address] TIA (transient ischemic attack), and Unspecified diseases of blood and blood-forming organs. has a past surgical history that includes Tonsillectomy; cyst removal (08/14/2013); Colonoscopy; Coronary angioplasty with stent (05/26/15); vascular surgery (aprx 2 years ago); Colonoscopy (2/29/2015); Upper gastrointestinal endoscopy (01/06/2016); Upper gastrointestinal endoscopy (01/29/2017); other surgical history (02/01/2017); Dialysis fistula creation (Left, 10/30/2017); Diagnostic Cardiac Cath Lab Procedure; other surgical history (2018); other surgical history (Bilateral, 06/26/2018); pr lap insertion tunneled intraperitoneal catheter (N/A, 9/21/2018); other surgical history (Right, 09/2018); Dialysis fistula creation (Left, 3/27/2019); other surgical history (05/28/2019); Endoscopy, colon, diagnostic; Catheter Removal (Right, 7/2/2019);  Aortic valve replacement (N/A, 10/15/2019); and Rectal surgery (N/A, 2/24/2022). Restrictions  Restrictions/Precautions  Restrictions/Precautions: General Precautions,Up as Tolerated,Fall Risk  Position Activity Restriction  Other position/activity restrictions: 2L O2, No BP in LUE  Subjective   General  Chart Reviewed: Yes  Response To Previous Treatment: Patient with no complaints from previous session. Family / Caregiver Present: No  Referring Practitioner: Wally  Subjective  Subjective: Pt agrees to therapy with encouragement  General Comment  Comments: RN cleared pt for therapy, pt had  just gotten up in chair on approach, attempting to get up  Pain Screening  Patient Currently in Pain: Yes  Pain Assessment  Pain Assessment: 0-10  Pain Level: 10  Pain Type: Acute pain;Chronic pain  Pain Location: Back;Buttocks  Pain Orientation: Lower  Non-Pharmaceutical Pain Intervention(s): Repositioned; Ambulation/Increased Activity; Distraction; Therapeutic presence  Vital Signs  Patient Currently in Pain: Yes       Orientation  Orientation  Overall Orientation Status: Within Functional Limits       Objective   Bed mobility  Bridging: Modified independent  (partial bridge up to [facility])  Rolling to Left: Modified independent  Rolling to Right: Modified independent  Supine to Sit: Unable to assess  Sit to Supine: Modified independent  Comment: up in chair on entry  Transfers  Sit to Stand: Stand by assistance  Stand to sit: Stand by assistance  Bed to Chair: Contact guard assistance  Ambulation  Ambulation?: Yes  Ambulation 1  Surface: level tile  Device: Rolling Walker  Assistance: Contact guard assistance  Quality of Gait: short stride, minimal foot clearance,  Distance: 30 ft  Stairs/Curb  Stairs?: No     Balance  Posture: Fair  Sitting - Static: Good;-  Sitting - Dynamic: Good;-  Standing - Static: Good;-  Standing - Dynamic: Fair;+  Exercises  Hip Flexion: x 15 B  Hip Abduction: x 15 clamshells  Knee Long Arc Quad: x 15 B  Ankle Pumps: x 15 B           AM-PAC Score     AM-PAC Inpatient Mobility without Stair Climbing Raw Score : 16 (03/02/22 1538)  AM-PAC Inpatient without Stair Climbing T-Scale Score : 45.54 (03/02/22 1538)  Mobility Inpatient CMS 0-100% Score: 40.64 (03/02/22 1538)  Mobility Inpatient without Stair CMS G-Code Modifier : CK (03/02/22 1538)       Goals  Short term goals  Time Frame for Short term goals: 1week ( 3/8) unless otherwise specified  Short term goal 1: pt to perform bed mob modified indep; 3/2 goal met mod I  Short term goal 2: pt to perform transfers supervised; 3/2 SBA  Short term goal 3: pt to amb wtih RW 50 ft CG; 3/2 30 ft with RW and CGA  Short term goal 4: pt to participate in LE Ex 5-10 reps by 3/6; 3/2 goal met, continue  Patient Goals   Patient goals : \" to be able to walk to the bathroom at home without help\"    Plan    Plan  Times per week: 2-3; decreased due to limited participation  Current Treatment Recommendations: Strengthening,Transfer Training,Endurance Training,Functional Mobility Training,Safety Education & Training,Gait Training,Pain Management,Balance Training  Safety Devices  Type of devices: All fall risk precautions in place,Bed alarm in place,Patient at risk for falls,Nurse notified,Left in bed,Call light within reach,Sitter present,Gait belt     Therapy Time   Individual Concurrent Group Co-treatment   Time In       6451   Time Out       1209   Minutes       24        If pt is discharged prior to next therapy session, this note will serve as discharge summary.     Ginny Lees, PT

## 2022-03-02 NOTE — PLAN OF CARE
Problem: Falls - Risk of:  Goal: Will remain free from falls  Description: Will remain free from falls  3/2/2022 0207 by Anne Xavier RN  Outcome: Ongoing  3/2/2022 0207 by Anne Xavier RN  Outcome: Ongoing     Problem: Falls - Risk of:  Goal: Absence of physical injury  Description: Absence of physical injury  3/2/2022 0207 by Anne Xavier RN  Outcome: Ongoing  3/2/2022 0207 by Anne Xavier RN  Outcome: Ongoing     Problem: Pain:  Goal: Pain level will decrease  Description: Pain level will decrease  3/2/2022 0207 by Anne Xavier RN  Outcome: Ongoing  3/2/2022 0207 by Anne Xavier RN  Outcome: Ongoing     Problem: Pain:  Goal: Control of acute pain  Description: Control of acute pain  3/2/2022 0207 by Anne Xavier RN  Outcome: Ongoing  3/2/2022 0207 by Anne Xavier RN  Outcome: Ongoing     Problem: Pain:  Goal: Control of chronic pain  Description: Control of chronic pain  3/2/2022 0207 by Anne Xavier RN  Outcome: Ongoing  3/2/2022 0207 by Anne Xavier RN  Outcome: Ongoing     Problem: Skin Integrity:  Goal: Will show no infection signs and symptoms  Description: Will show no infection signs and symptoms  3/2/2022 0207 by Anne Xavier RN  Outcome: Ongoing  3/2/2022 0207 by Anne Xavier RN  Outcome: Ongoing     Problem: Skin Integrity:  Goal: Absence of new skin breakdown  Description: Absence of new skin breakdown  3/2/2022 0207 by Anne Xavier RN  Outcome: Ongoing  3/2/2022 0207 by Anne Xavier RN  Outcome: Ongoing

## 2022-03-02 NOTE — PROGRESS NOTES
03/01/22 2110   NIV Type   $NIV $Daily Charge   Skin Assessment Clean, dry, & intact   Skin Protection for O2 Device Yes   Location Nose   NIV Started/Stopped On   Equipment Type v60   Mode Bilevel   Mask Type Full face mask   Mask Size Medium   Settings/Measurements   IPAP 16 cmH20   CPAP/EPAP 10 cmH2O   Rate Ordered 12   Resp 15   FiO2  30 %   I Time/ I Time % 1 s   Vt Exhaled 398 mL   Minute Volume 6 Liters   Mask Leak (lpm) 78 lpm  (best seal obtained)   Comfort Level Good   Using Accessory Muscles No   SpO2 97   Alarm Settings   Alarms On Y   Press Low Alarm 6 cmH2O   High Pressure Alarm 30 cmH2O   Apnea (secs) 20 secs   Resp Rate Low Alarm 6   High Respiratory Rate 40 br/min

## 2022-03-02 NOTE — PROGRESS NOTES
Occupational/Physical Therapy   Treatment Attempt Note    Attempted OT/PT treatment this date, however pt off floor to HD. Will re-attempt later or next date as schedule permits and pt medically stable.      Kimberley Gar, OTR/АННА Escobedo, PT

## 2022-03-02 NOTE — PROGRESS NOTES
Treatment time: 210    Net UF: 1600    Pre weight: 113.3kg  Post weight: 109.7  EDW:     Access used: CVC  Access function: well    Medications or blood products given: epogen 10,000 units    Regular outpatient schedule: MWF    Summary of response to treatment: tolerated well no issues    Copy of dialysis treatment record placed in chart, to be scanned into EMR.

## 2022-03-02 NOTE — PLAN OF CARE
Problem: Falls - Risk of:  Goal: Will remain free from falls  Description: Will remain free from falls  3/2/2022 0207 by Malcom Peña RN  Outcome: Ongoing  3/2/2022 0207 by Malcom Peña RN  Outcome: Ongoing  Goal: Absence of physical injury  Description: Absence of physical injury  3/2/2022 0207 by Malcom Peña RN  Outcome: Ongoing  3/2/2022 0207 by Malcom Peña RN  Outcome: Ongoing     Problem: Pain:  Goal: Pain level will decrease  Description: Pain level will decrease  3/2/2022 0207 by Malcom Peña RN  Outcome: Ongoing  3/2/2022 0207 by Malcom Peña RN  Outcome: Ongoing  Goal: Control of acute pain  Description: Control of acute pain  3/2/2022 0207 by Malcom Peña RN  Outcome: Ongoing  3/2/2022 0207 by Malcom Peña RN  Outcome: Ongoing  Goal: Control of chronic pain  Description: Control of chronic pain  3/2/2022 0207 by Malcom Peña RN  Outcome: Ongoing  3/2/2022 0207 by Malcom Peña RN  Outcome: Ongoing     Problem: Skin Integrity:  Goal: Will show no infection signs and symptoms  Description: Will show no infection signs and symptoms  3/2/2022 0207 by Malcom Peña RN  Outcome: Ongoing  3/2/2022 0207 by Malcom Peña RN  Outcome: Ongoing  Goal: Absence of new skin breakdown  Description: Absence of new skin breakdown  3/2/2022 0207 by Malcom Peña RN  Outcome: Ongoing  3/2/2022 0207 by Malcom Peña RN  Outcome: Ongoing     Problem: Non-Violent Restraints  Goal: Removal from restraints as soon as assessed to be safe  3/2/2022 0207 by Malcom Peña RN  Outcome: Completed  3/2/2022 0207 by Malcom Peña RN  Outcome: Met This Shift  Goal: No harm/injury to patient while restraints in use  3/2/2022 0207 by Malcom Peña RN  Outcome: Completed  3/2/2022 0207 by Malcom Peña RN  Outcome: Met This Shift  Goal: Patient's dignity will be maintained  3/2/2022 0207 by Malcom Peña RN  Outcome: Completed  3/2/2022 0207 by Malcom Peña RN  Outcome: Met This Shift     Problem: HEMODYNAMIC STATUS  Goal: Patient has stable vital signs and fluid balance  3/2/2022 0207 by Monika Singh RN  Outcome: Ongoing  3/2/2022 0207 by Monika Singh RN  Outcome: Ongoing     Problem: ACTIVITY INTOLERANCE/IMPAIRED MOBILITY  Goal: Mobility/activity is maintained at optimum level for patient  3/2/2022 0207 by Monika Singh RN  Outcome: Ongoing  3/2/2022 0207 by Monika Singh RN  Outcome: Ongoing     Problem: COMMUNICATION IMPAIRMENT  Goal: Ability to express needs and understand communication  3/2/2022 0207 by Monika Singh RN  Outcome: Ongoing  3/2/2022 0207 by Monika Singh RN  Outcome: Ongoing     Problem: Nutrition  Goal: Optimal nutrition therapy  3/2/2022 0207 by Monika Singh RN  Outcome: Ongoing  3/2/2022 0207 by Monika Singh RN  Outcome: Ongoing

## 2022-03-02 NOTE — PROGRESS NOTES
03/02/22 0030   NIV Type   $NIV $Daily Charge   NIV Started/Stopped On   Equipment Type v60   Mode Bilevel   Mask Type Full face mask   Mask Size Medium   Settings/Measurements   IPAP 16 cmH20   CPAP/EPAP 10 cmH2O   Rate Ordered 12   Resp 16   FiO2  30 %   Vt Exhaled 420 mL   Mask Leak (lpm) 40 lpm   Comfort Level Good   Using Accessory Muscles No   Alarm Settings   Alarms On Y   Press Low Alarm 6 cmH2O   High Pressure Alarm 30 cmH2O   Resp Rate Low Alarm 6   High Respiratory Rate 40 br/min

## 2022-03-02 NOTE — PROGRESS NOTES
Chuy Davis  Neurology Follow-up  [facility] Neurology    Date of Service: 3/2/2022    Subjective:   CC: Follow up today regarding: Acute CVA    Events noted. Chart and lab reviewed. More alert and oriented. ROS : A 10-12 system review obtained and updated today and is unremarkable except as mentioned  in my interval history. family history includes Alcohol Abuse in his brother; Cancer in his sister and sister; Diabetes in his mother; Heart Disease in his father, sister, and sister; Kidney Disease in his sister; Obesity in his sister and sister.     Past Medical History:   Diagnosis Date    Ambulatory dysfunction     walker for long distances, SOB with distance    Aortic stenosis     echo 2017    Arthritis     hands and hips    Asthma     Bilateral hilar adenopathy syndrome 6/3/2013    CAD (coronary artery disease)     Dr. Nu Velasquez [facility]) 04/19/2019    EF= 43%    CHF (congestive heart failure) ([facility])     Chronic pain     COPD (chronic obstructive pulmonary disease) ([address]     pulmonology Dr. Chan Neighbours    Depression     Diabetes mellitus ([address]     borderline    Difficult intravenous access     Emphysema of lung ([address]     ESRD (end stage renal disease) on dialysis ([address]     MWF    Fear of needles     Gastric ulcer     GERD (gastroesophageal reflux disease)     Heart valve problem     bicuspic valve    Hemodialysis patient ([address]     History of spinal fracture     work incident    Hx of blood clots     Bilateral lower extremities; stents in place    Hyperlipidemia     Hypertension     MI (myocardial infarction) ([address] 2019    has had 9 MIs. 2019 was the last    Neuromuscular disorder ([address]     due to CVA    Numbness and tingling in left arm     from fistula    Pneumonia     PONV (postoperative nausea and vomiting)     Prolonged emergence from general anesthesia     States requires more medication than most people    Sleep apnea     Uses CPAP    Stroke [facility])     7mm thalamic cva 2017 deficts left side, left side weakness    TIA (transient ischemic attack)     Unspecified diseases of blood and blood-forming organs      Current Facility-Administered Medications   Medication Dose Route Frequency Provider Last Rate Last Admin    vancomycin (VANCOCIN) 750 mg in dextrose 5 % 250 mL IVPB  750 mg IntraVENous Once Jonathan Pitts  mL/hr at 03/02/22 1201 750 mg at 03/02/22 1201    dextrose 5 % solution             apixaban (ELIQUIS) tablet 5 mg  5 mg Oral BID Jonathan Pitts MD        oxyCODONE (ROXICODONE) immediate release tablet 10 mg  10 mg Oral Q4H PRN JAY Cohen - CNP   10 mg at 03/02/22 1137    pantoprazole (PROTONIX) tablet 40 mg  40 mg Oral QAM AC Mirza Davis MD   40 mg at 03/02/22 0515    metoprolol succinate (TOPROL XL) extended release tablet 50 mg  50 mg Oral BID Santiago Dominion APRN - CNP   50 mg at 03/02/22 1159    heparin (porcine) injection 4,700 Units  4,700 Units IntraCATHeter PRN Smita Hansen MD   4,700 Units at 02/28/22 1253    haloperidol lactate (HALDOL) injection 5 mg  5 mg IntraMUSCular Q6H PRN JAY Allen - CNP   5 mg at 03/01/22 0231    meropenem (MERREM) 500 mg IVPB (mini-bag)  500 mg IntraVENous Q24H Mirza Davis MD   Stopped at 03/01/22 9525    albuterol (PROVENTIL) nebulizer solution 2.5 mg  2.5 mg Nebulization Q4H PRN Mirza Davis MD        epoetin siddharth-epbx (RETACRIT) injection 10,000 Units  10,000 Units IntraVENous Q MWF Masoud Abad MD   10,000 Units at 03/02/22 0956    prochlorperazine (COMPAZINE) injection 10 mg  10 mg IntraVENous Q6H PRN Masoud Abad MD   10 mg at 02/24/22 6203    acetaminophen (TYLENOL) tablet 650 mg  650 mg Oral 3 times per day Masoud Abad MD   650 mg at 03/02/22 0515    fentaNYL (SUBLIMAZE) injection 25 mcg  25 mcg IntraVENous Q2H PRN Masoud Abad MD   25 mcg at 02/24/22 0025    tiotropium-olodaterol (STIOLTO) 2.5-2.5 MCG/ACT inhaler 2 puff  2 puff Inhalation Daily Katie Mae MD   2 puff at 03/01/22 0747    oxyCODONE (ROXICODONE) immediate release tablet 5 mg  5 mg Oral Q6H PRN Katie Mae MD   5 mg at 02/25/22 0609    albuterol sulfate  (90 Base) MCG/ACT inhaler 2 puff  2 puff Inhalation Q6H PRN Katie Mae MD        calcium acetate (PHOSLO) capsule 667 mg  667 mg Oral TID WC Katie Mae MD   667 mg at 03/02/22 1136    clopidogrel (PLAVIX) tablet 75 mg  75 mg Oral Daily Katie Mae MD   75 mg at 03/02/22 1137    cyclobenzaprine (FLEXERIL) tablet 5 mg  5 mg Oral TID PRN Katie Mae MD   5 mg at 03/02/22 1138    DULoxetine (CYMBALTA) extended release capsule 60 mg  60 mg Oral Daily Katie Mae MD   60 mg at 03/02/22 1137    [Held by provider] gabapentin (NEURONTIN) capsule 100 mg  100 mg Oral TID Katie Mae MD   100 mg at 02/24/22 2055    [Held by provider] isosorbide mononitrate (IMDUR) extended release tablet 30 mg  30 mg Oral Daily Katie Mae MD   30 mg at 02/24/22 0756    [Held by provider] lisinopril (PRINIVIL;ZESTRIL) tablet 10 mg  10 mg Oral Daily Katie Mae MD   10 mg at 02/23/22 1241    QUEtiapine (SEROQUEL) tablet 50 mg  50 mg Oral Nightly Katie Mae MD   50 mg at 03/01/22 2036    torsemide (DEMADEX) tablet 100 mg  100 mg Oral Daily Katie Mae MD   100 mg at 03/02/22 1137    traZODone (DESYREL) tablet 150 mg  150 mg Oral Nightly Katie Mae MD   150 mg at 03/01/22 2036    linaclotide (LINZESS) capsule 145 mcg  145 mcg Oral QAM AC Katie Mae MD   145 mcg at 03/02/22 0515    pravastatin (PRAVACHOL) tablet 40 mg  40 mg Oral Daily Katie Mae MD   40 mg at 03/02/22 1138    insulin lispro (HUMALOG) injection vial 0-18 Units  0-18 Units SubCUTAneous TID WC Baudilio Keller MD   3 Units at 03/01/22 1355    insulin lispro (HUMALOG) injection vial 0-9 Units  0-9 Units SubCUTAneous Nightly Baudilio Keller MD   2 Units at 03/01/22 2037    glucose (GLUTOSE) 40 % oral gel 15 g  15 g Oral PRN Baudilio Keller MD        glucagon (rDNA) injection 1 mg  1 mg IntraMUSCular PRN Baudilio Keller MD        dextrose 5 % solution  100 mL/hr IntraVENous PRN Baudilio Keller MD        sodium chloride flush 0.9 % injection 5-40 mL  5-40 mL IntraVENous 2 times per day Baduilio Keller MD   10 mL at 03/02/22 1139    sodium chloride flush 0.9 % injection 5-40 mL  5-40 mL IntraVENous PRN Baudilio Keller MD   10 mL at 02/26/22 0953    0.9 % sodium chloride infusion  25 mL IntraVENous PRN Baudilio Keller  mL/hr at 02/28/22 1748 25 mL at 02/28/22 1748    dextrose bolus (hypoglycemia) 10% 125 mL  125 mL IntraVENous PRN Baudilio Keller MD   Stopped at 02/25/22 1227    Or    dextrose bolus (hypoglycemia) 10% 250 mL  250 mL IntraVENous PRN Baudilio Keller MD        vancomycin [facility]) intermittent dosing (placeholder)   Other RX Placeholder Baudilio Keller MD         Allergies   Allergen Reactions    Morphine Nausea And Vomiting      reports that he quit smoking about 22 months ago. His smoking use included cigarettes. He has a 16.50 pack-year smoking history. He has never used smokeless tobacco. He reports previous alcohol use. He reports that he does not use drugs.        Objective:  Exam:   Constitutional:   Vitals:    03/02/22 0518 03/02/22 0536 03/02/22 0702 03/02/22 1035   BP: 138/68  135/69 (!) 102/54   Pulse: 74  80 71   Resp: 18  18 18   Temp: 98.8 °F (37.1 °C)  98.2 °F (36.8 °C) 97.1 °F (36.2 °C)   TempSrc: Oral      SpO2: 97%      Weight:  268 lb 1.3 oz (121.6 kg) 249 lb 12.5 oz (113.3 kg) 241 lb 13.5 oz (109.7 kg)   Height:         General appearance:  Normal development and appear in no acute distress. Mental Status:   Oriented to person, place, problem. Did not know month. Memory: Good immediate recall. Intact remote memory  Normal attention span and concentration. Language: intact naming, repeating and fluency   Poor fund of Knowledge. Cranial Nerves:   II: Visual fields: Full. Pupils: equal, round, reactive to light  III,IV,VI: Extra Ocular Movements are intact. No nystagmus  V: Facial sensation is intact  VII: Facial strength and movements: mild right facial droop  IX: Palate elevation is symmetric  XI: Shoulder shrug is intact  XII: Tongue movements are normal  Musculoskeletal: 5/5 in all 4 extremities. Tone: Normal tone. Reflexes: Symmetric 2+ in both arms and legs. Coordination: no pronator drift, no dysmetria with FNF  Sensation: normal to all modalities in both arms and legs. Gait/Posture: did not test gait. Data:  LABS:   Lab Results   Component Value Date     03/02/2022    K 4.7 03/02/2022    CL 96 03/02/2022    CO2 23 03/02/2022    BUN 51 03/02/2022    CREATININE 7.7 03/02/2022    GFRAA 9 03/02/2022    GFRAA >60 05/17/2013    LABGLOM 7 03/02/2022    GLUCOSE 137 03/02/2022    PHOS 5.1 02/04/2022    MG 1.80 02/04/2022    CALCIUM 8.9 03/02/2022     Lab Results   Component Value Date    WBC 13.3 03/02/2022    RBC 3.01 03/02/2022    HGB 8.7 03/02/2022    HCT 27.4 03/02/2022    MCV 91.1 03/02/2022    RDW 16.6 03/02/2022     03/02/2022     Lab Results   Component Value Date    INR 1.02 02/01/2022    PROTIME 11.5 02/01/2022       Neuroimaging was independently reviewed by me and discussed results with the patient  I reviewed blood testing and other test results and discussed results with the patient      Impression:    Acute/subacute left thalamic and parietal lobe infarct   ESRD on HD  HTN  HLD, uncontrolled. . DM2, uncontrolled. A1c 9.4.   PAF on Eliquis  Cellulitis       Recommendation    Patient is already on Eliquis and Plavix. No objection from a neurological perspective to increasing Eliquis to 5 mg BID. Discussed with IM and nephrology, who are in agreement. BP meds on hold for soft BP. Monitor on tele. Continue statin. Improved glycemic control. SSI coverage while inpatient. PT/OT/SLP  Continue abx for cellulitis. HD per nephrology.       Nothing more to add from a neurological perspective. We will sign off. Please call with questions. Colonel Baird, CNP    This dictation was generated by voice recognition computer software. Although all attempts are made to edit the dictation for accuracy, there may be errors in the transcription that are not intended.

## 2022-03-02 NOTE — PROGRESS NOTES
PALLIATIVE MEDICINE PROGRESS NOTE     Patient name:Igor Le    JTY:[id number] :1970  Room/Bed:0367/0367-01    LOS: 9 days        ASSESSMENT/RECOMMENDATIONS     46 y.o. male with ESRD on dialysis, COPD not on home O2, CHF, CAD, CVA and T2DM. His hospital course was complicated by worsening encephalopathy and  acute/subacute small left thalamic and parietal lobe infarcts. Symptom Management:  Altered mental status - Improving - Alert and oriented x 4 today with answers slow to come. Being followed by neurology. Pain - low back pain/buttock pain from abscess. Has scheduled tylenol and PRN oxycodone 5-10 mh q4. Pain is currently a 9/10. Pain medicine helps, but does not take all the pain away and he is accepting of this. Appears comfortable. Goals of Care - Full code. Discussed with patient today. Wife/POA has poor insight into severity of his disease (patient admits he has not been entirely truthful with her in the past) and is shocked where we are now. Ongoing GOC discussion is needed. Ultimately both patient and wife hope patient can return home and can stabilize with significant lifestyle changes. States he knows if he doesn't make changes he is going to die. Open to home palliative care support. Palliative will continue to follow this admission for [facility]. Patient/Family Goals of Care :    3/2/22    Spoke with patient at bedside while in dialysis. He is alert and oriented x4 and willing to talk with me. He is aware of the severity of his illness and is aware that he will die if he does not start taking better care of himself. He says he does not want to die. He wants to continue with his dialysis and continue living at home with his wife. He is hopeful he can make the necessary changes (diet, medication compliance, exercise, smoking) and is agreeable to additional support, such as home palliative care for help.   He absolutely does not want to go to a SNF, and wants to go back home. Discussed code status: Although in some cases it does save lives, CPR (cardiopulmonary resuscitation) frequently is not successful or does not benefit those who receive it, especially elderly people or those with serious medical conditions. Even if revived, the person can be left with painful injuries, or in a debilitated state, or with brain damage resulting from oxygen deprivation. Resuscitation can involve such things as drugs, forcefully pressing on the chest, giving electric shocks to restart the heart or placing a tube down the nose or throat to provide artificial breathing. People with terminal illnesses or other serious health conditions may prefer not to be resuscitated. Patient states he wants full resuscitation measures, but if left in a \"vegetable state\" and not showing improvement within 2 weeks time, he would want withdrawal of care to let him pass. Discussed the emotional burden this could cause for his wife and he says he is aware. 2/28/22     Spoke with patient while in dialysis. He is drowsy and oriented to self and situation only and is very slow to provide answers. Nods his head that I should talk to his wife, Hugo Cornejo for information.     Spoke with Wife, Hugo Cornejo and step son, Chata Douglas by phone for 40 minutes. Wife says she was shocked to hear how sick patient was, as she thought he was getting better. He told her he was back on the list to get a transplant. Shi Daley says he was not as shocked and saw this coming. Patient has been sneaking food and sneaking smoking for many years. He says the nurses at dialysis make him eat pizza while he is there. He does not take his medication correctly or regularly. He kicked the prior PT/OT out who tried to work with him. He does nothing but sleep, eat and watch TV. He tells his wife he wants to live as long as possible to be able to support her and her grandchildren.   She is unaware of any time that he talked about possibly not continuing with dialysis. Appears wife has limited understanding of what has actually been going on with patient medically over the past several years due to some misleading information he may have been giving her. Rosemarie Barroso says patient manipulates and lies to wife constantly so wife ends up enabling him. They feel he needs to hear from the doctors that he is going to die if he doesn't make changes and it would also be helpful if this could be told to him while the family is there to witness (otherwise they feel like patient will lie and deny anyone ever told him this information). We discussed the living will on file (2019)  that stated he wanted  no life support. Wife states \"he wanted to reverse that\". She states that he wants intubation/ventilator, chest compressions, defibrillation and all medications if anything should happen. She knows he does not want to be a \"vegetable\", but she would take him off of life support if that ever became the case. She would like him to remain a full code at this time and says she is certain that is what patient would want.        Palliative will continue the [facility] [address] conversation with wife (and patient if oriented) together in person tomorrow since she will be visiting. Disposition/Discharge Plan :    -Pending medical course  -Will benefit from home palliative care if d/c'd home  -Family requesting home nurse to help with medication organization, dietician to help with meal planning and education, PT/OT to work with patient at home (say he will refuse a SNF) and an electric wheel chair (says he doesn't walk because he has a fear of falling).       Advance Directives:  Code status:  Full Code  Decision Maker: Patient. Surrogate is Buffy Bass wife      Palliative Performance Scale:     [] 60%  Amb reduced; Sig dz. Can't do hobbies/housework;  Intake normal or reduced, Occasional assist; LOC full/confusion   [x] 50%  Mainly sit/lie; Extensive disease. Mainly assist, Intake normal or reduced; Occasional assist; LOC full/confusion   [] 40%  Mainly in bed; Extensive disease; Mainly assist; Intake normal or reduced; Occasional assist; LOC full/confusion   [] 30%  Bed bound, Extensive disease; Total care; Intake reduced; LOC full/confusion   [] 20%  Bed bound; Extensive disease; Total care; Intake minimal; Drowsy/coma   [] 10%  Bed bound; Extensive disease; Total care; Mouth care only; Drowsy/coma   []  0%   Death      Case Discussed with:  Patient, nursing    Thank you for allowing us to participate in the care of this patient. SUBJECTIVE     Chief Complaint: shortness of breath    Last 24 hours:   Alert and oriented today. Continues to have back pain    ROS:  Review of Systems   Unable to perform ROS: Mental status change   Constitutional: Positive for activity change and fatigue. Negative for fever. HENT: Negative. Negative for congestion, mouth sores, sore throat and trouble swallowing. Eyes: Negative. Respiratory: Negative for cough, chest tightness and shortness of breath. Cardiovascular: Negative for chest pain, palpitations and leg swelling. Gastrointestinal: Positive for abdominal distention. Negative for abdominal pain, nausea and vomiting. Genitourinary: Positive for decreased urine volume. Musculoskeletal: Positive for back pain. Skin: Negative. Neurological: Negative. Psychiatric/Behavioral: Positive for confusion. Patient unable to complete full ROS due to current cognitive status. Information that is obtained from nursing and chart           OBJECTIVE   /69   Pulse (!) 8   Temp 98.2 °F (36.8 °C)   Resp 18   Ht 5' 9\" (1.753 m)   Wt 249 lb 12.5 oz (113.3 kg)   SpO2 97%   BMI 36.89 kg/m²   I/O last 3 completed shifts: In: 240 [P.O.:240]  Out: -   No intake/output data recorded. Physical Exam    Physical Exam  Vitals and nursing note reviewed.    Constitutional:       General: He is not in acute distress. Appearance: He is obese. He is ill-appearing. Comments: drowsy   HENT:      Head: Normocephalic and atraumatic. Nose: Nose normal. No congestion or rhinorrhea. Mouth/Throat:      Mouth: Mucous membranes are moist.      Pharynx: Oropharynx is clear. Eyes:      Extraocular Movements: Extraocular movements intact. Pupils: Pupils are equal, round, and reactive to light. Cardiovascular:      Rate and Rhythm: Normal rate and regular rhythm. Comments: Pulse 80  Pulmonary:      Effort: Pulmonary effort is normal. No respiratory distress. Breath sounds: No stridor. No wheezing. Abdominal:      General: There is distension. Palpations: Abdomen is soft. There is no mass. Tenderness: There is no abdominal tenderness. Musculoskeletal:         General: Normal range of motion. Cervical back: Normal range of motion and neck supple. Right lower leg: No edema. Left lower leg: No edema. Skin:     General: Skin is warm and dry. Capillary Refill: Capillary refill takes more than 3 seconds. Neurological:      Mental Status: He is alert. Mental status is at baseline.    Psychiatric:         Mood and Affect: Mood normal.         Behavior: Behavior normal.              Total time: 35 minutes  >50% of time spent counseling patient at bedside or POA/family member if applicable , reviewing information and discussing care, coordinating with care team       Signed By: Electronically signed by JAY Nesbitt CNP on 3/2/2022 at 8:18 AM   Palliative Medicine   [phone]    March 2, 2022

## 2022-03-03 VITALS
TEMPERATURE: 98 F | HEART RATE: 68 BPM | RESPIRATION RATE: 18 BRPM | OXYGEN SATURATION: 99 % | HEIGHT: 69 IN | DIASTOLIC BLOOD PRESSURE: 64 MMHG | WEIGHT: 248.46 LBS | BODY MASS INDEX: 36.8 KG/M2 | SYSTOLIC BLOOD PRESSURE: 110 MMHG

## 2022-03-03 LAB
A/G RATIO: 1.3 (ref 1.1–2.2)
ALBUMIN SERPL-MCNC: 3.4 G/DL (ref 3.4–5)
ALP BLD-CCNC: 96 U/L (ref 40–129)
ALT SERPL-CCNC: 6 U/L (ref 10–40)
AMMONIA: 18 UMOL/L (ref 16–60)
ANION GAP SERPL CALCULATED.3IONS-SCNC: 17 MMOL/L (ref 3–16)
AST SERPL-CCNC: 7 U/L (ref 15–37)
BANDED NEUTROPHILS RELATIVE PERCENT: 8 % (ref 0–7)
BASOPHILS ABSOLUTE: 0 K/UL (ref 0–0.2)
BASOPHILS RELATIVE PERCENT: 0 %
BILIRUB SERPL-MCNC: <0.2 MG/DL (ref 0–1)
BUN BLDV-MCNC: 39 MG/DL (ref 7–20)
CALCIUM SERPL-MCNC: 8.7 MG/DL (ref 8.3–10.6)
CHLORIDE BLD-SCNC: 94 MMOL/L (ref 99–110)
CO2: 21 MMOL/L (ref 21–32)
CREAT SERPL-MCNC: 5.9 MG/DL (ref 0.9–1.3)
EOSINOPHILS ABSOLUTE: 0.3 K/UL (ref 0–0.6)
EOSINOPHILS RELATIVE PERCENT: 2 %
GFR AFRICAN AMERICAN: 12
GFR NON-AFRICAN AMERICAN: 10
GLUCOSE BLD-MCNC: 119 MG/DL (ref 70–99)
GLUCOSE BLD-MCNC: 120 MG/DL (ref 70–99)
GLUCOSE BLD-MCNC: 122 MG/DL (ref 70–99)
GLUCOSE BLD-MCNC: 137 MG/DL (ref 70–99)
HCT VFR BLD CALC: 26.6 % (ref 40.5–52.5)
HEMOGLOBIN: 8.5 G/DL (ref 13.5–17.5)
LYMPHOCYTES ABSOLUTE: 0.7 K/UL (ref 1–5.1)
LYMPHOCYTES RELATIVE PERCENT: 5 %
MACROCYTES: ABNORMAL
MCH RBC QN AUTO: 29.2 PG (ref 26–34)
MCHC RBC AUTO-ENTMCNC: 32 G/DL (ref 31–36)
MCV RBC AUTO: 91 FL (ref 80–100)
METAMYELOCYTES RELATIVE PERCENT: 2 %
MONOCYTES ABSOLUTE: 0.8 K/UL (ref 0–1.3)
MONOCYTES RELATIVE PERCENT: 6 %
MYELOCYTE PERCENT: 1 %
NEUTROPHILS ABSOLUTE: 11.4 K/UL (ref 1.7–7.7)
NEUTROPHILS RELATIVE PERCENT: 76 %
PDW BLD-RTO: 16.4 % (ref 12.4–15.4)
PERFORMED ON: ABNORMAL
PLATELET # BLD: 415 K/UL (ref 135–450)
PLATELET SLIDE REVIEW: ADEQUATE
PMV BLD AUTO: 7.5 FL (ref 5–10.5)
POLYCHROMASIA: ABNORMAL
POTASSIUM REFLEX MAGNESIUM: 4.2 MMOL/L (ref 3.5–5.1)
RBC # BLD: 2.92 M/UL (ref 4.2–5.9)
SLIDE REVIEW: ABNORMAL
SODIUM BLD-SCNC: 132 MMOL/L (ref 136–145)
TOTAL PROTEIN: 6 G/DL (ref 6.4–8.2)
WBC # BLD: 13.1 K/UL (ref 4–11)

## 2022-03-03 PROCEDURE — 2580000003 HC RX 258: Performed by: STUDENT IN AN ORGANIZED HEALTH CARE EDUCATION/TRAINING PROGRAM

## 2022-03-03 PROCEDURE — 6370000000 HC RX 637 (ALT 250 FOR IP): Performed by: SURGERY

## 2022-03-03 PROCEDURE — 80053 COMPREHEN METABOLIC PANEL: CPT

## 2022-03-03 PROCEDURE — 94761 N-INVAS EAR/PLS OXIMETRY MLT: CPT

## 2022-03-03 PROCEDURE — 2580000003 HC RX 258: Performed by: SURGERY

## 2022-03-03 PROCEDURE — 6370000000 HC RX 637 (ALT 250 FOR IP): Performed by: NURSE PRACTITIONER

## 2022-03-03 PROCEDURE — 94640 AIRWAY INHALATION TREATMENT: CPT

## 2022-03-03 PROCEDURE — 6360000002 HC RX W HCPCS: Performed by: STUDENT IN AN ORGANIZED HEALTH CARE EDUCATION/TRAINING PROGRAM

## 2022-03-03 PROCEDURE — 6370000000 HC RX 637 (ALT 250 FOR IP): Performed by: STUDENT IN AN ORGANIZED HEALTH CARE EDUCATION/TRAINING PROGRAM

## 2022-03-03 PROCEDURE — 2700000000 HC OXYGEN THERAPY PER DAY

## 2022-03-03 PROCEDURE — 36415 COLL VENOUS BLD VENIPUNCTURE: CPT

## 2022-03-03 PROCEDURE — 94660 CPAP INITIATION&MGMT: CPT

## 2022-03-03 PROCEDURE — 82140 ASSAY OF AMMONIA: CPT

## 2022-03-03 PROCEDURE — 6370000000 HC RX 637 (ALT 250 FOR IP): Performed by: INTERNAL MEDICINE

## 2022-03-03 PROCEDURE — 85025 COMPLETE CBC W/AUTO DIFF WBC: CPT

## 2022-03-03 PROCEDURE — 2500000003 HC RX 250 WO HCPCS: Performed by: SURGERY

## 2022-03-03 RX ORDER — OXYCODONE HYDROCHLORIDE 5 MG/1
5 TABLET ORAL EVERY 8 HOURS PRN
Qty: 6 TABLET | Refills: 0 | Status: SHIPPED | OUTPATIENT
Start: 2022-03-03 | End: 2022-03-06

## 2022-03-03 RX ORDER — METOPROLOL SUCCINATE 50 MG/1
50 TABLET, EXTENDED RELEASE ORAL 2 TIMES DAILY
Qty: 60 TABLET | Refills: 1 | Status: ON HOLD | OUTPATIENT
Start: 2022-03-03 | End: 2022-07-21 | Stop reason: HOSPADM

## 2022-03-03 RX ADMIN — TORSEMIDE 100 MG: 100 TABLET ORAL at 08:41

## 2022-03-03 RX ADMIN — CALCIUM ACETATE 667 MG: 667 CAPSULE ORAL at 08:42

## 2022-03-03 RX ADMIN — CLOPIDOGREL BISULFATE 75 MG: 75 TABLET ORAL at 08:41

## 2022-03-03 RX ADMIN — ACETAMINOPHEN 650 MG: 325 TABLET ORAL at 13:51

## 2022-03-03 RX ADMIN — ACETAMINOPHEN 650 MG: 325 TABLET ORAL at 06:10

## 2022-03-03 RX ADMIN — PANTOPRAZOLE SODIUM 40 MG: 40 TABLET, DELAYED RELEASE ORAL at 06:10

## 2022-03-03 RX ADMIN — PRAVASTATIN SODIUM 40 MG: 40 TABLET ORAL at 08:41

## 2022-03-03 RX ADMIN — DULOXETINE HYDROCHLORIDE 60 MG: 60 CAPSULE, DELAYED RELEASE ORAL at 08:41

## 2022-03-03 RX ADMIN — MEROPENEM 500 MG: 500 INJECTION, POWDER, FOR SOLUTION INTRAVENOUS at 13:17

## 2022-03-03 RX ADMIN — APIXABAN 5 MG: 5 TABLET, FILM COATED ORAL at 08:43

## 2022-03-03 RX ADMIN — SODIUM CHLORIDE, PRESERVATIVE FREE 10 ML: 5 INJECTION INTRAVENOUS at 08:43

## 2022-03-03 RX ADMIN — CALCIUM ACETATE 667 MG: 667 CAPSULE ORAL at 13:14

## 2022-03-03 RX ADMIN — TIOTROPIUM BROMIDE AND OLODATEROL 2 PUFF: 3.124; 2.736 SPRAY, METERED RESPIRATORY (INHALATION) at 08:23

## 2022-03-03 RX ADMIN — METOPROLOL SUCCINATE 50 MG: 50 TABLET, EXTENDED RELEASE ORAL at 08:41

## 2022-03-03 ASSESSMENT — PAIN DESCRIPTION - LOCATION: LOCATION: BACK

## 2022-03-03 ASSESSMENT — ENCOUNTER SYMPTOMS
SORE THROAT: 0
BACK PAIN: 1
COUGH: 0
TROUBLE SWALLOWING: 0
SHORTNESS OF BREATH: 0
NAUSEA: 0
ABDOMINAL PAIN: 0
VOMITING: 0
CHEST TIGHTNESS: 0
ABDOMINAL DISTENTION: 1
EYES NEGATIVE: 1

## 2022-03-03 ASSESSMENT — PAIN SCALES - GENERAL
PAINLEVEL_OUTOF10: 0
PAINLEVEL_OUTOF10: 7
PAINLEVEL_OUTOF10: 8

## 2022-03-03 ASSESSMENT — PAIN DESCRIPTION - ORIENTATION: ORIENTATION: LOWER

## 2022-03-03 NOTE — DISCHARGE INSTR - COC
Continuity of Care Form    Patient Name: Trevor Freeman   :  1970  MRN:  [id number]    Admit date:  2022  Discharge date:  3/3/22    Code Status Order: Full Code   Advance Directives:   Advance Care Flowsheet Documentation       Date/Time Healthcare Directive Type of Healthcare Directive Copy in [address] Agent's Name Healthcare Agent's Phone Number    22 1431 Unknown, patient unable to respond due to medical condition -- -- -- -- --            Admitting Physician:  Cy Vivar MD  PCP: Carli Palacio MD    Discharging Nurse: [facility] Unit/Room#: 7735/5330-69  Discharging Unit Phone Number: ***    Emergency Contact:   Extended Emergency Contact Information  Primary Emergency Contact: Crystal Ann  Address: [address] [place] [address], [facility] Phone: [phone]  Mobile Phone: [phone]  Relation: Spouse    Past Surgical History:  Past Surgical History:   Procedure Laterality Date    AORTIC VALVE REPLACEMENT N/A 10/15/2019    TRANSCATHETER AORTIC VALVE REPLACEMENT FEMORAL APPROACH performed by Mariela Garcia MD at [address] [facility] Right 2019    PERITONEAL DIALYSIS CATHETER REMOVAL performed by Corinne Milliner, MD at [address] [place] [address]      WN    CORONARY ANGIOPLASTY WITH STENT PLACEMENT  05/26/15    CYST REMOVAL  2013    EXCISION CYSTS, NECK X2 AND ABDOMINAL benign    DIAGNOSTIC CARDIAC CATH LAB PROCEDURE      DIALYSIS FISTULA CREATION Left 10/30/2017    LEFT BRACHIAL CEPHALIC FISTULA    DIALYSIS FISTULA CREATION Left 3/27/2019    LIGATION  AV FISTULA performed by Gracy Queen MD at [address] [place] [address] COLON, DIAGNOSTIC      OTHER SURGICAL HISTORY  2017    laparoscopic cholecystectomy with intraoperative cholangiogram    OTHER SURGICAL HISTORY  2018    PORT PLACEMENT  - vas cath    OTHER SURGICAL HISTORY Bilateral 06/26/2018    laprascopic peritoneal dialysis catheter placement    OTHER SURGICAL HISTORY Right 09/2018    peritoneal dialysis port placed on right side of abdomen    OTHER SURGICAL HISTORY  05/28/2019    PTA/Stenting R External Iliac artery    MS LAP INSERTION TUNNELED INTRAPERITONEAL CATHETER N/A 9/21/2018    LAPAROSCOPIC PERITONEAL DIALYSIS CATHETER REPLACEMENT performed by Harpal Thomason MD at [address] 2/24/2022    PERINEAL ABCESS DRAINAGE performed by Harpal Thomason MD at [facility].  01/06/2016    UPPER GASTROINTESTINAL ENDOSCOPY  01/29/2017    possible candida, otherwise normal appearing    VASCULAR SURGERY  aprx 2 years ago    2 stents placed, each side of groin       Immunization History:   Immunization History   Administered Date(s) Administered    Hepatitis B Adult (Engerix-B) 11/18/2017, 06/13/2018, 07/13/2018    Influenza Virus Vaccine 12/27/2012, 10/07/2014, 11/20/2015, 10/16/2019    Influenza, Intradermal, Quadrivalent, Preservative Free 11/16/2016    Influenza, MDCK Quadv, IM, PF (Flucelvax 2 yrs and older) 10/14/2017, 09/30/2020, 10/05/2021    Influenza, Quadv, 6 mo and older, IM, PF (Flulaval, Fluarix) 09/15/2018    Influenza, Quadv, IM, PF (6 mo and older Fluzone, Flulaval, Fluarix, and 3 yrs and older Afluria) 10/16/2019    PPD Test 11/09/2017, 11/16/2017, 01/03/2019, 01/06/2020, 01/15/2021    Pneumococcal Conjugate 13-valent ([id number]) 10/14/2017    Pneumococcal Polysaccharide ([id number]) 10/07/2014, 11/19/2019    Tdap (Boostrix, Adacel) 02/13/2020    Zoster Recombinant (Shingrix) 02/13/2020       Active Problems:  Patient Active Problem List   Diagnosis Code    Sepsis without acute organ dysfunction ([facility] A41.9    Asthma-COPD overlap syndrome ([facility] J44.9    Coronary artery disease involving native coronary artery of native heart without angina pectoris I25.10    PVD (peripheral vascular disease) ([facility] I73.9    Bicuspid aortic valve Q23.1    Bilateral hilar adenopathy syndrome R59.0    Claudication in peripheral vascular disease ([facility]) I73.9    HTN (hypertension), benign I10    Diabetic neuropathy ([facility]) E11.40    Type 2 diabetes, uncontrolled, with neuropathy ([facility]) E11.40, E11.65    Passive smoke exposure Z77.22    Depression with anxiety F41.8    Pneumonia of right upper lobe due to infectious organism J18.9    Obesity (BMI 30-39. 9) E66.9    ZAINAB on CPAP G47.33, Z99.89    Degeneration of lumbar or lumbosacral intervertebral disc M51.37    Lumbar radiculopathy M54.16    Lumbosacral spondylosis without myelopathy X01.354    Biliary colic O75.61    Symptomatic cholelithiasis K80.20    Gastroparesis due to DM ([facility]) E11.43, K31.84    Elevated troponin R77.8    Angina, class IV ([facility]) I20.9    Dyspnea R06.00    Dyslipidemia E78.5    Acute on chronic combined systolic and diastolic heart failure ([facility]) I50.43    Ischemic cardiomyopathy I25.5    Tobacco abuse Z72.0    CVA (cerebral vascular accident) ([facility] I63.9    History of CVA (cerebrovascular accident) Z86.73    Type 2 diabetes mellitus without complication, without long-term current use of insulin ([facility]) E11.9    ZAINAB (obstructive sleep apnea) G47.33    Pleural effusion J90    Chronic anemia D64.9    Nonrheumatic aortic valve stenosis I35.0    Mucus plugging of bronchi T17.500A    Hemodialysis-associated hypotension I95.3    ESRD (end stage renal disease) ([facility]) N18.6    Hypotension due to drugs S91.9    Acute diastolic CHF (congestive heart failure) ([facility]) I50.31    Neuromuscular disorder ([facility]) G70.9    Renovascular hypertension I15.0    Mixed hyperlipidemia E78.2    Cigarette nicotine dependence in remission F17.211    Pulmonary edema J81.1    Fluid overload E87.70    Anemia of chronic disease D63.8    SOB (shortness of breath) on exertion R06.02    Steal syndrome of dialysis vascular access ([facility]) T82.898A    Chronic, continuous use of opioids F11.90    Chronic bronchitis ([facility]) J42    Nasal congestion R09.81    Hypercholesteremia E78.00    Bradycardia R00.1    S/P TAVR (transcatheter aortic valve replacement) Z95.2    Syncope and collapse R55    PAF (paroxysmal atrial fibrillation) ([facility]) I48.0    Bilateral leg weakness R29.898    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    Sinus pause I45.5    Weakness of both lower extremities R29.898    Sinus bradycardia R00.1    Ataxia R27.0    Peripheral vascular occlusive disease ([facility]) I73.9    Cellulitis of right foot L03.115    Iliac artery occlusion, 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[facility]) I74.5    Cellulitis and abscess of hand L03.119, L02.519    Type 2 diabetes mellitus with hyperglycemia ([facility]) E11.65    Acute encephalopathy G93.40    Acute hypoxemic respiratory failure ([facility]) J96.01    Acute CVA (cerebrovascular accident) ([facility] I63.9    Speech problem R47.9    Urinary tract infection with hematuria N39.0, R31.9    Respiratory failure with hypoxia ([facility]) J96.91    Acute respiratory failure with hypercapnia ([facility]) J96.02    Acute pulmonary edema ([facility]) J81.0    Obesity, Class II, BMI 35-39.9 E66.9    Grade II diastolic dysfunction Z05.20    Shock circulatory ([facility]) R57.9    Smoker F17.200    Normocytic normochromic anemia D64.9    NSTEMI (non-ST elevated myocardial infarction) ([facility] I21.4    SIRS (systemic inflammatory response syndrome) ([facility]) R65.10    Atrial flutter ([facility]) I48.92    Liberty-rectal abscess K61.1    Somnolence R40.0       Isolation/Infection:   Isolation            No Isolation          Patient Infection Status       Infection Onset Added Last Indicated Last Indicated By Review Planned Expiration Resolved Resolved By    None active    Resolved    COVID-19 (Rule Out) 02/25/22 02/25/22 02/25/22 COVID-19, Rapid (Ordered)   02/25/22 Rule-Out Test Resulted    COVID-19 (Rule Out) 01/12/22 01/12/22 01/12/22 COVID-19, Rapid (Ordered)   01/12/22 Rule-Out Test Resulted    COVID-19 (Rule Out) 11/29/21 11/29/21 11/29/21 COVID-19, Rapid (Ordered)   11/30/21 Rule-Out Test Resulted    COVID-19 (Rule Out) 08/29/21 08/29/21 08/29/21 COVID-19 (Ordered)   08/29/21 Rule-Out Test Resulted    COVID-19 (Rule Out) 12/18/20 12/18/20 12/18/20 COVID-19 (Ordered)   12/18/20 Rule-Out Test Resulted    COVID-19 (Rule Out) 05/04/20 05/04/20 05/04/20 COVID-19 (Ordered)   05/04/20 Rule-Out Test Resulted            Nurse Assessment:  Last Vital Signs: /63   Pulse 70   Temp 97.6 °F (36.4 °C) (Oral)   Resp 18   Ht 5' 9\" (1.753 m)   Wt 248 lb 7.3 oz (112.7 kg)   SpO2 99%   BMI 36.69 kg/m²     Last documented pain score (0-10 scale): Pain Level: 0  Last Weight:   Wt Readings from Last 1 Encounters:   03/03/22 248 lb 7.3 oz (112.7 kg)     Mental Status:  {IP PT MENTAL STATUS:20030}    IV Access:  { CELINE IV ACCESS:210801161}    Nursing Mobility/ADLs:  Walking   {P DME JODA:722725757}  Transfer  {P DME JVVD:624144184}  Bathing  {P DME NHBV:239689739}  Dressing  {P DME XVPF:542453244}  Toileting  {P DME TMHW:560280381}  Feeding  {P DME OGIV:837538690}  Med Admin  {P DME LJTM:526820605}  Med Delivery   { CELINE MED Delivery:728581859}    Wound Care Documentation and Therapy:  Wound 08/30/21 Toe (Comment  which one) Right Great Toe (Active)   Number of days: 185       Wound 01/12/22 Pedal Anterior;Right Healing scab from previous blister (per pt), flaky edges (Active)   Wound Cleansed Not Cleansed 02/03/22 2022   Dressing/Treatment Open to air 03/03/22 0815   Wound Assessment Dry;Purple/maroon 03/02/22 1045   Drainage Amount None 03/02/22 1045   Odor None 03/02/22 1045   Liberty-wound Assessment Dry/flaky 03/02/22 1045   Margins Defined edges 03/02/22 1045   Number of days: 50       Wound 01/12/22 Toe (Comment  which one) Anterior;Right Scab from old blister (per pt) on 4th toe, flaky edges (Active)   Wound Cleansed Not Cleansed 02/03/22 2022   Dressing/Treatment Open to air 03/02/22 1045   Wound Assessment Dry;Purple/maroon 03/02/22 1045   Drainage Amount None 22 1045   Odor None 22 1045   Liberty-wound Assessment Dry/flaky 22 1045   Margins Defined edges 22 1045   Number of days: 50        Elimination:  Continence: Bowel: {YES / LE:11681}  Bladder: {YES / YH:11343}  Urinary Catheter: {Urinary Catheter:987854483}   Colostomy/Ileostomy/Ileal Conduit: {YES / QM:80792}       Date of Last BM: ***    Intake/Output Summary (Last 24 hours) at 3/3/2022 1456  Last data filed at 3/3/2022 0950  Gross per 24 hour   Intake 237 ml   Output --   Net 237 ml     I/O last 3 completed shifts: In: 400   Out: 2000     Safety Concerns:     508 RV ID Safety Concerns:[id number]}    Impairments/Disabilities:      508 RV ID Impairments/Disabilities:[id number]}    Nutrition Therapy:  Current Nutrition Therapy:   508 RV ID Diet List:053485710}    Routes of Feeding: {[facility] DME Other Feedings:481583417}  Liquids: {Slp liquid thickness:85373}  Daily Fluid Restriction: {CHP DME Yes amt example:322293922}  Last Modified Barium Swallow with Video (Video Swallowing Test): {Done Not Done URWI:114317635}    Treatments at the Time of Hospital Discharge:   Respiratory Treatments: ***  Oxygen Therapy:  {Therapy; copd oxygen:85290}  Ventilator:    { CC Vent ZSWY:773334118}    Rehab Therapies: {THERAPEUTIC INTERVENTION:8753907314}  Weight Bearing Status/Restrictions: [employer]  Weight Bearin}  Other Medical Equipment (for information only, NOT a DME order):  {EQUIPMENT:646456678}  Other Treatments: ***    Patient's personal belongings (please select all that are sent with patient):  {[facility] DME Belongings:809662042}    RN SIGNATURE:  {Esignature:628440534}    CASE MANAGEMENT/SOCIAL WORK SECTION    Inpatient Status Date: 22    Readmission Risk Assessment Score:  Readmission Risk              Risk of Unplanned Readmission:  79           Discharging to Facility/ [address]  [address] [phone]         Dialysis Facility (if applicable)   Name:[facility]  Address:  Dialysis Schedule: M-W-F  Phone:[phone]  Fax:    / signature: Electronically signed by Ruby Cardoso RN on 3/3/22 at 3:53 PM EST    PHYSICIAN SECTION    Prognosis: Fair    Condition at Discharge: Stable    Rehab Potential (if transferring to Rehab): Fair    Recommended Labs or Other Treatments After Discharge: Adjust your insulin regimen at home to prevent hypoglycemia, monitor your BP at home twice daily(contact if too low or too high and discuss with your nephrologist about this)    Physician Certification: I certify the above information and transfer of Cecilia Mcarthur  is necessary for the continuing treatment of the diagnosis listed and that he requires [address] for less 30 days.      Update Admission H&P: No change in H&P    PHYSICIAN SIGNATURE:  {Esignature:[id number]}

## 2022-03-03 NOTE — PLAN OF CARE
Problem: Falls - Risk of:  Goal: Will remain free from falls  Description: Will remain free from falls  3/3/2022 0955 by Kenisha Bentley, RN  Outcome: Ongoing  3/3/2022 0305 by Claire Porter, RN  Outcome: Ongoing

## 2022-03-03 NOTE — DISCHARGE SUMMARY
Hospital Medicine Discharge Summary    Patient ID: Irene Francisco      Patient's PCP: Noemí Hernandes MD    Admit Date: 2/21/2022     Discharge Date: 3/3/2022      Admitting Provider: Lana Arredondo MD     Discharge Provider: Marybeth Mota MD     Discharge Diagnoses: Active Hospital Problems    Diagnosis     Acute on chronic combined systolic and diastolic heart failure (HCC) [I50.43]      Priority: High    Dyslipidemia [E78.5]      Priority: High    Type 2 diabetes, uncontrolled, with neuropathy (HCC) [E11.40, E11.65]      Priority: High    Coronary artery disease involving native coronary artery of native heart without angina pectoris [I25.10]      Priority: High    HTN (hypertension), benign [I10]      Priority: Medium    [place]-rectal abscess [K61.1]     Somnolence [R40.0]     Atrial flutter ([facility]) [I48.92]     SIRS (systemic inflammatory response syndrome) ([facility]) [R65.10]     Acute CVA (cerebrovascular accident) ([facility] [I63.9]     PAF (paroxysmal atrial fibrillation) ([facility]) [I48.0]     ESRD (end stage renal disease) ([facility] [N18.6]     Elevated troponin [R77.8]     Obesity (BMI 30-39. 9) [E66.9]     Sepsis without acute organ dysfunction ([facility] [A41.9]        The patient was seen and examined on day of discharge and this discharge summary is in conjunction with any daily progress note from day of discharge. Hospital Course:   History Of Present Illness:       46 y.o. male, with PMH of ESRD, CHF, HTN, HLD, DM and obesity, who presented to [facility] with shortness of breath and buttock pain. History obtained from the patient and review of EMR. The patient stated earlier this morning he began to experience shortness of breath. He does have end-stage renal disease and receives HD on [facility]. His nephrologist is Dr. Gemma Jones.   The patient stated he went to dialysis this morning and was only able to tolerate roughly 30 minutes of the run due to worsening shortness of breath and mild chest tightness. He stated his shortness of breath continued throughout the day so he went to the emergency room for further evaluation. The patient stated he does follow with Dr. Damon Falcon from pulmonology and believes he needs oxygen at home, but does not have any at this time. Upon arrival to the emergency room the patient was 97% on room air. Nephrology has been consulted. The patient also stated that he has pain in the center of his butt. He stated he has known that he has had an abscess there for a long time, but it has not bothered him much. However, the patient stated for the last 3 days he feels like it is getting bigger and it is becoming more painful to the point he cannot lie on his back. In the emergency room a CT pelvis was obtained that revealed a small14 mm ovoid fluid collection in subcutaneous fat of the right perineum unchanged from atleast 01/01/2021. Minimal adjacent subcutaneous fat stranding. This may represent a sebaceous cyst with superimposed infection. No soft tissue gas or other drainable fluid collection. The patient was started on vancomycin and Zosyn. He will be admitted for further evaluation and treatment. General surgery has been consulted. The patient denied any other associated symptoms as well as any aggravating and/or alleviating factors. At the time of this assessment, the patient was resting comfortably in bed. He currently denies any chest pain, back pain, abdominal pain, shortness of breath, numbness, tingling, N/V/C/D, fever and/or chills.            Encephalopathy secondary to Acute/subacute thalamic CVA- encephalopathy improved.   -CT head and MRI with left thalamic and left parietal lobe, also noted chronic infarcts in periventricular white matter, right thalamus, layo and bilateral hemispheres  -neurology consulted as family requested their input understanding it may not change mgmt, would have them weigh in on pts risk of hemorraghic conversion with increasing apixaban dose to 5mg (HASBLED score 6 but stroke is small and pt started on DOAC >24 hours after presumed incident), neuro agreeable to inc'g eliquis to 5mg bid  - neuro check, [facility] ordered, last known normal >24 hours ago  - continued Plavix and Apixaban, statin   - held gabapentin   -palliative care was consulted, rec follow at home     Perianal cellulitis w/ sepsis (SIRS criteira WBC 13.1, )  - CT pelvis revealed: small 14 mm ovoid fluid collection in subcutaneous fat of the right perineum unchanged from atleast 01/01/2021. Minimal adjacent subcutaneous fat stranding. This may represent a sebaceous cyst with superimposed infection. No soft tissue gas or other drainable fluid collection.  -gen surg consulted  -surgical cultures: 1+ GPC cocci, Bld cx NGTD  - abcx can likely complete a 7-10 day course (ended 3/3)  - pain regimen adjusted: acetaminophen scheduled, oxycodone PO for breakthrough pain and IV opioids if PO insufficient.     Leukocytosis- can be reactive in setting of acute stroke versus ongoing infection. Only new focal finding is cough. Per nursing pt had loose stools today but not diarrhea. Pt did not want me to see perianal wound, per nursing it looks very well. [address] On adequate broad spectrum abcx. Hold course for now as pt is clinically improving.  - f/u CXR 2/27(noted hypoventilatory changes in LLL)  - monitor BM      Acute hypoxic respiratory failure- likely volume overload from inadequate iHD based on initial CXR but sxs seem to overnight developed hypoxia (88%) on 2L NC. Initially presentation was dyspnea with out hypoxia. Suspect pt may have now aspirated. Ddimer acutely elevated > 2000 but CTA without PE. Covid rapid negative. - continue iHD with nephrology assistance. - overnight/naps CPAP  - wean oxygen as able     TIIDM- a1c 9.4 on 1/12/2022. Hypoglycemia noted. Holding basal and continuing SSI     Afib/aflutter with RVR-.  CHADSVASC 6.  - cardiology was following, f/u on 3/24 as outpt  - now on DOAC, stopped asa, continued plavix    -continue bb     Essential HTN  -continue metoprolol; lisinopril on hold d/t hypotension recently but can likely restart if hemodynamics remain stable     HLD  -continue pravastatin     Elevated troponin, 0.15-->0.17 on admission  -no c/o chest pain  -likely 2/2 demand ischemia r/t ESRD  -trend troponin flat  -tele monitored     Acute on chronic CHF, stable  -strict I&O  -daily weights (dry wt 119)  -continued demadex     Chronic normocytic anemia likely from anemia of chronic disease  -no s/s of bleeding at this time     Anxiety depression  -continue home meds  -mood stable at this time     ESRD iHD MWF  - nephrology following     Obesity  With Body mass index is 38.4 kg/m². Complicating assessment and treatment. Placing patient at risk for multiple co-morbidities as well as early death and contributing to the patient's presentation. Counseled on weight loss    Physical Exam Performed:     /64   Pulse 68   Temp 98 °F (36.7 °C) (Oral)   Resp 18   Ht 5' 9\" (1.753 m)   Wt 248 lb 7.3 oz (112.7 kg)   SpO2 99%   BMI 36.69 kg/m²       General appearance:  No apparent distress, appears stated age and cooperative. HEENT:  Normal cephalic, atraumatic without obvious deformity. Pupils equal, round, and reactive to light. Extra ocular muscles intact. Conjunctivae/corneas clear. Neck: Supple, with full range of motion. No jugular venous distention. Trachea midline. Respiratory:  Normal respiratory effort. Clear to auscultation, bilaterally without Rales/Wheezes/Rhonchi. Cardiovascular:  Regular rate and rhythm with normal S1/S2 without murmurs, rubs or gallops. Abdomen: Soft, non-tender, non-distended with normal bowel sounds. Musculoskeletal:  No clubbing, cyanosis or edema bilaterally. Full range of motion without deformity. Skin: Skin color, texture, turgor normal.  No rashes or lesions.   Neurologic: Neurovascularly intact without any focal sensory/motor deficits. Cranial nerves: II-XII intact, grossly non-focal.  Psychiatric:  Alert and oriented, thought content appropriate, normal insight  Capillary Refill: Brisk,< 3 seconds   Peripheral Pulses: +2 palpable, equal bilaterally       Labs: For convenience and continuity at follow-up the following most recent labs are provided:      CBC:    Lab Results   Component Value Date    WBC 13.1 03/03/2022    HGB 8.5 03/03/2022    HCT 26.6 03/03/2022     03/03/2022       Renal:    Lab Results   Component Value Date     03/03/2022    K 4.2 03/03/2022    CL 94 03/03/2022    CO2 21 03/03/2022    BUN 39 03/03/2022    CREATININE 5.9 03/03/2022    CALCIUM 8.7 03/03/2022    PHOS 5.1 02/04/2022         Significant Diagnostic Studies    Radiology:   VL DUP CAROTID BILATERAL   Final Result      XR CHEST PORTABLE   Final Result   Hypoventilatory changes noted in the left lung base. MRI BRAIN WO CONTRAST   Final Result   1. Acute/early subacute infarct involving the left thalamus and left parietal   lobe towards the vertex. 2. Cerebral parenchymal volume loss with chronic microvascular white matter   ischemic disease. 3. Chronic infarcts are noted in the periventricular white matter, right   thalamus, right layo, and bilateral cerebellar hemispheres. 4. Worsening bilateral mastoid effusions and paranasal sinus disease. CT HEAD WO CONTRAST   Final Result   Mild cerebral atrophy. Remote lacunar infarcts in the basal ganglia and both   thalami. There is a new area of low attenuation in the mid lateral portion of the left   thalamus consistent with an infarct of indeterminate age. MRI is more   sensitive for evaluation of the brain parenchyma if indicated. CT CHEST PULMONARY EMBOLISM W CONTRAST   Final Result   There is a suboptimal contrast bolus, limiting evaluation of the segmental   and subsegmental branches.   No central pulmonary embolism is detected. Patchy airspace disease in the lower lungs bilaterally, greater on the left,   most compatible with multifocal pneumonia or aspiration. Small left pleural   effusion has developed. Mildly enlarged mediastinal lymph nodes, similar compared to the previous   exam.  These are most likely reactive, but a follow-up chest CT in   approximately 3 months is recommended to ensure resolution. The small nodule seen previously within the left lower lobe is obscured on   the current examination. Please refer to the previous exam for   recommendations. XR CHEST PORTABLE   Final Result   No acute process. CT PELVIS WO CONTRAST Additional Contrast? None   Final Result   1. Small 14 mm ovoid fluid collection in the subcutaneous fat of the right   perineum unchanged from at least 01/01/2021. Minimal adjacent subcutaneous   fat stranding. This may represent a sebaceous cyst with superimposed   infection not excluded. 2. No soft tissue gas or other drainable fluid collection. 3. No acute osseous abnormality. XR CHEST PORTABLE   Final Result   Vascular congestion. No consolidation. Stable cardiomegaly.                 Consults:     PHARMACY TO DOSE VANCOMYCIN  IP CONSULT TO HOSPITALIST  IP CONSULT TO NEPHROLOGY  PHARMACY TO DOSE VANCOMYCIN  IP CONSULT TO GENERAL SURGERY  IP CONSULT TO HEART FAILURE NURSE/COORDINATOR  IP CONSULT TO DIETITIAN  IP CONSULT TO DIETITIAN  IP CONSULT TO CARDIOLOGY  IP CONSULT TO NEUROLOGY  IP CONSULT TO PALLIATIVE CARE  IP CONSULT TO HOME CARE NEEDS  IP CONSULT TO DIETITIAN    Disposition:  Home with C(pt declined SNF)     Condition at Discharge: Stable    Discharge Instructions/Follow-up:  nephro as outpt    Code Status:  FULL    Activity: activity as tolerated    Diet: renal diet      Discharge Medications:     Discharge Medication List as of 3/3/2022  4:41 PM           Details   oxyCODONE (ROXICODONE) 5 MG immediate release tablet Take 1 tablet by mouth every 8 hours as needed (severe pain only, use sparingly caution as this is addictive) for up to 3 days. , Disp-6 tablet, R-0Print      apixaban (ELIQUIS) 5 MG TABS tablet Take 1 tablet by mouth 2 times daily, Disp-60 tablet, R-1Normal              Details   metoprolol succinate (TOPROL XL) 50 MG extended release tablet Take 1 tablet by mouth in the morning and at bedtime, Disp-60 tablet, R-1Normal              Details   pantoprazole (PROTONIX) 40 MG tablet TAKE 1 TABLET BY MOUTH EVERY MORNING BEFORE BREAKFAST, Disp-30 tablet, R-1Normal      pravastatin (PRAVACHOL) 40 MG tablet Take 1 tablet by mouth daily, Disp-90 tablet, R-3Normal      clopidogrel (PLAVIX) 75 MG tablet Take 1 tablet by mouth daily, Disp-90 tablet, R-3Normal      oxyCODONE-acetaminophen (PERCOCET) 7.5-325 MG per tablet Take 1 tablet by mouth every 6 hours as needed (pain) for up to 30 days. , Disp-120 tablet, R-0Normal      QUEtiapine (SEROQUEL) 50 MG tablet TAKE 1 TABLET BY MOUTH IN THE EVENING, Disp-30 tablet, R-2Normal      cyclobenzaprine (FLEXERIL) 10 MG tablet TAKE 1 TABLET BY MOUTH EVERY 8 HOURS AS NEEDED, Disp-90 tablet, R-2Normal      fluticasone (FLONASE) 50 MCG/ACT nasal spray SHAKE LIQUID AND USE 2 SPRAYS IN EACH NOSTRIL DAILY, Disp-48 g, R-0**Patient requests 90 days supply**Normal      insulin glargine (BASAGLAR KWIKPEN) 100 UNIT/ML injection pen Inject 30 Units into the skin nightly, Disp-15 mL, R-1Normal      gabapentin (NEURONTIN) 100 MG capsule TAKE 1-2 CAPSULES BY MOUTH THREE TIMES A DAY, Disp-180 capsule, R-5Normal       MG capsule TAKE 1 CAPSULE BY MOUTH TWICE DAILY, Disp-60 capsule, R-5Normal      Continuous Blood Gluc Sensor (DEXCOM G6 SENSOR) MISC Every 10 days, Disp-9 each, R-3Print      Continuous Blood Gluc Transmit (DEXCOM G6 TRANSMITTER) MISC 1 each by Does not apply route every 3 months, Disp-1 each, R-3Print      Continuous Blood Gluc  (DEXCOM G6 ) ADAM 1 each by Does not apply route Daily with lunch, Disp-1 each, R-0Print      DULoxetine (CYMBALTA) 60 MG extended release capsule TAKE 1 CAPSULE BY MOUTH EVERY DAY, Disp-30 capsule, R-5Normal      traZODone (DESYREL) 150 MG tablet TAKE ONE (1) TABLET BY MOUTH NIGHTLY, Disp-30 tablet, R-10Normal      B Complex-C-Folic Acid (VIRT-CAPS) 1 MG CAPS TAKE 1 CAPSULE BY MOUTH EVERY DAY, Disp-90 capsule, R-1Normal      Calcium Acetate, Phos Binder, 667 MG CAPS TAKE 1 CAPSULE BY MOUTH THREE TIMES DAILY WITH MEALS, Disp-90 capsule, R-3Normal      LINZESS 145 MCG capsule TAKE 1 CAPSULE BY MOUTH EVERY MORNING BEFORE BREAKFAST, Disp-30 capsule, R-10Normal      nitroGLYCERIN (NITROSTAT) 0.4 MG SL tablet DISSOLVE 1 TABLET UNDER THE TONGUE AS NEEDED FOR CHEST PAIN EVERY 5 MINUTES UP TO 3 TIMES. IF NO RELIEF CALL 911., Disp-25 tablet, R-10Normal      insulin aspart (NOVOLOG FLEXPEN) 100 UNIT/ML injection pen Inject 20 Units into the skin 3 times daily (before meals), Disp-15 pen,R-5Normal      vitamin D (ERGOCALCIFEROL) 33682 units CAPS capsule TK 1 C PO WEEKLY, R-11Historical Med      Tiotropium Bromide-Olodaterol (STIOLTO RESPIMAT) 2.5-2.5 MCG/ACT AERS Inhale 2 puffs into the lungs daily, Disp-2 Inhaler, R-02 samples given:  Lot #[id number], Exp 7/21 & Lot #[id number], Exp 7/21NO PRINT      Polyethylene Glycol 3350 GRAN Starting Wed 5/2/2018, Historical Med      Blood Glucose Monitoring Suppl ADAM Disp-1 Device, R-0, NormalUSE AS DIRECTED. Alcohol Swabs PADS Disp-300 each, R-3, NormalUSE AS DIRECTED      albuterol sulfate  (90 Base) MCG/ACT inhaler Inhale 2 puffs into the lungs every 6 hours as needed for Wheezing, Disp-1 Inhaler, R-3Print      ipratropium-albuterol (DUONEB) 0.5-2.5 (3) MG/3ML SOLN nebulizer solution Inhale 3 mLs into the lungs every 6 hours as needed for Shortness of Breath, Disp-360 mL, R-1Print      calcium carbonate (TUMS) 500 MG chewable tablet Take 1 tablet by mouth 3 times daily as needed for Heartburn. Time Spent on discharge is more than 30 minutes in the examination, evaluation, counseling and review of medications and discharge plan. Signed:    Jennifer Nguyen MD   3/4/2022      Thank you Arabella Cool MD for the opportunity to be involved in this patient's care. If you have any questions or concerns please feel free to contact me at [phone].

## 2022-03-03 NOTE — PROGRESS NOTES
Pt resting in bed. Vss. Sitter to remain at bedside. Pt currently back on BIPAP. Scheduled medications given per order. C/o lower back pain, PRN pain medication given per order. No s/s of distress at this time.

## 2022-03-03 NOTE — CARE COORDINATION
CASE MANAGEMENT DISCHARGE SUMMARY      Discharge to: HOME    IMM LETTER:  3/3/22    Transportation:    Family/car: WIFE HERE AND WILL TRANSPORT HOME     Facility/Agency, name:  [address] [place] [address]  For RN,PT & OT     Spoke with Fernie Colindres, who will pull orders from [address] [place] [address] Dialysis M-W-F   Chair time 1500. This writer called Keenan Lezama ([phone]) - gone for the day - LM  Call to [facility] and  a return call from [facility]. She has sent message to [facility] site. States patient is still in schedule. Contacted Apryl Lester [phone] to notify her of patient's discharge. RN, name: Raissa Tellez    Note: Discharging nurse to complete CELINE, reconcile AVS, and place final copy with patient's discharge packet. RN to ensure that written prescriptions for  Level II medications are sent with patient to the facility as per protocol.       Jaleel Hoffman RN

## 2022-03-03 NOTE — PROGRESS NOTES
Patient remains in bed with sitter at bedside. Continues with oxygen per nasal cannula. Scheduled medications given. No distress at this time, resting comfortable.

## 2022-03-03 NOTE — PROGRESS NOTES
bedside. Patient is alert and oriented x4, no longer drowsy. Attempted to help patient understand that if he does not make lifestyle changes he is going to be back in the hospital, which he says he does not want. If he does not want to make lifestyle changes and does not want to come back to the hospital his option is hospice. Patient adamant he will not consider hospice, he wants to live. He is afraid of coming back to the hospital, but he doesn't feel like he can make all of the needed changes at once. We discussed this further and how we can support him to make realistic goals for meaningful changes, even if small at first.  He is agreeable to this and agreeable to home palliative care. He is also requesting a daily HH aide and weekly RN to assist with medications. Wife is in agreement. Wife is requesting electric wheel chair (pt does not want) due to her fear that patient will fall and she can't pick him up. 3/2/22    Spoke with patient at bedside while in dialysis. He is alert and oriented x4 and willing to talk with me. He is aware of the severity of his illness and is aware that he will die if he does not start taking better care of himself. He says he does not want to die. He wants to continue with his dialysis and continue living at home with his wife. He is hopeful he can make the necessary changes (diet, medication compliance, exercise, smoking) and is agreeable to additional support, such as home palliative care for help. He absolutely does not want to go to a SNF, and wants to go back home. Discussed code status: Although in some cases it does save lives, CPR (cardiopulmonary resuscitation) frequently is not successful or does not benefit those who receive it, especially elderly people or those with serious medical conditions. Even if revived, the person can be left with painful injuries, or in a debilitated state, or with brain damage resulting from oxygen deprivation. Resuscitation can involve such things as drugs, forcefully pressing on the chest, giving electric shocks to restart the heart or placing a tube down the nose or throat to provide artificial breathing. People with terminal illnesses or other serious health conditions may prefer not to be resuscitated. Patient states he wants full resuscitation measures, but if left in a \"vegetable state\" and not showing improvement within 2 weeks time, he would want withdrawal of care to let him pass. Discussed the emotional burden this could cause for his wife and he says he is aware. 2/28/22     Spoke with patient while in dialysis. He is drowsy and oriented to self and situation only and is very slow to provide answers. Nods his head that I should talk to his wife, Liliana Yost for information.     Spoke with Wife, Liliana Yost and step son, Star Gallego by phone for 40 minutes. Wife says she was shocked to hear how sick patient was, as she thought he was getting better. He told her he was back on the list to get a transplant. Kathy Becerra says he was not as shocked and saw this coming. Patient has been sneaking food and sneaking smoking for many years. He says the nurses at dialysis make him eat pizza while he is there. He does not take his medication correctly or regularly. He kicked the prior PT/OT out who tried to work with him. He does nothing but sleep, eat and watch TV. He tells his wife he wants to live as long as possible to be able to support her and her grandchildren. She is unaware of any time that he talked about possibly not continuing with dialysis. Appears wife has limited understanding of what has actually been going on with patient medically over the past several years due to some misleading information he may have been giving her. Laurent Olivier says patient manipulates and lies to wife constantly so wife ends up enabling him.    They feel he needs to hear from the doctors that he is going to die if he doesn't make changes and it would also be helpful if this could be told to him while the family is there to witness (otherwise they feel like patient will lie and deny anyone ever told him this information). We discussed the living will on file (2019)  that stated he wanted  no life support. Wife states \"he wanted to reverse that\". She states that he wants intubation/ventilator, chest compressions, defibrillation and all medications if anything should happen. She knows he does not want to be a \"vegetable\", but she would take him off of life support if that ever became the case. She would like him to remain a full code at this time and says she is certain that is what patient would want.        Palliative will continue the [address] conversation with wife (and patient if oriented) together in person tomorrow since she will be visiting. Disposition/Discharge Plan :    -Pending medical course  -[facility] referral  -Family requesting home nurse to help with medication organization, dietician to help with meal planning and education, PT/OT to work with patient at home (say he will refuse a SNF) and an electric wheel chair (says he doesn't walk because he has a fear of falling).       Advance Directives:  Code status:  Full Code  Decision Maker: Patient. Surrogate is Ayesha Jamil, wife      Palliative Performance Scale:     [] 60%  Amb reduced; Sig dz. Can't do hobbies/housework; Intake normal or reduced, Occasional assist; LOC full/confusion   [] 50%  Mainly sit/lie; Extensive disease. Mainly assist, Intake normal or reduced; Occasional assist; LOC full/confusion   [x] 40%  Mainly in bed; Extensive disease; Mainly assist; Intake normal or reduced; Occasional assist; LOC full/confusion   [] 30%  Bed bound, Extensive disease; Total care; Intake reduced; LOC full/confusion   [] 20%  Bed bound; Extensive disease; Total care; Intake minimal; Drowsy/coma   [] 10%  Bed bound; Extensive disease;  Total care; Mouth care only; Drowsy/coma   []  0%   Death      Case Discussed with:  Patient, nursing, MD, case management    Thank you for allowing us to participate in the care of this patient. SUBJECTIVE     Chief Complaint: shortness of breath    Last 24 hours:   Alert and oriented today. Continues to have back pain    ROS:  Review of Systems   Constitutional: Positive for activity change and fatigue. Negative for chills and fever. HENT: Negative. Negative for congestion, mouth sores, sore throat and trouble swallowing. Eyes: Negative. Respiratory: Negative for cough, chest tightness and shortness of breath. Cardiovascular: Negative for chest pain, palpitations and leg swelling. Gastrointestinal: Positive for abdominal distention. Negative for abdominal pain, nausea and vomiting. Genitourinary: Positive for decreased urine volume. Musculoskeletal: Positive for back pain. Skin: Negative. Neurological: Negative. Psychiatric/Behavioral: Positive for confusion. Patient unable to complete full ROS due to current cognitive status. Information that is obtained from nursing and chart           OBJECTIVE   /63   Pulse 70   Temp 97.6 °F (36.4 °C) (Oral)   Resp 18   Ht 5' 9\" (1.753 m)   Wt 248 lb 7.3 oz (112.7 kg)   SpO2 99%   BMI 36.69 kg/m²   I/O last 3 completed shifts: In: 400   Out: 2000   I/O this shift:  In: 237 [P.O.:237]  Out: -       Physical Exam    Physical Exam  Vitals and nursing note reviewed. Constitutional:       General: He is not in acute distress. Appearance: He is obese. He is ill-appearing. Comments: drowsy   HENT:      Head: Normocephalic and atraumatic. Nose: Nose normal. No congestion or rhinorrhea. Mouth/Throat:      Mouth: Mucous membranes are moist.      Pharynx: Oropharynx is clear. Eyes:      Extraocular Movements: Extraocular movements intact. Pupils: Pupils are equal, round, and reactive to light.    Cardiovascular:      Rate and Rhythm:

## 2022-03-03 NOTE — PROGRESS NOTES
[facility] Progress Note           Subjective/   46y.o. year old male who we are seeing in consultation for ESKD on HD. HPI:  Last HD on 3/2 with 1.6 liters removed, post-weight of 109.7 kg. Denies any shortness of breath. ROS:  +weak, intake reduced. Objective/   GEN:  Chronically ill, /60   Pulse 66   Temp 97.6 °F (36.4 °C) (Oral)   Resp 18   Ht 5' 9\" (1.753 m)   Wt 248 lb 7.3 oz (112.7 kg)   SpO2 98%   BMI 36.69 kg/m²   HEENT: non-icteric, no JVD  CV: S1, S2 without m/r/g; no LE edema  RESP: CTA B without w/r/r; breathing wnl  ABD: +bs, soft, nt, no hsm  SKIN: warm, no rashes  ACCESS: Left [facility]    Data/  Recent Labs     03/01/22  0732 03/02/22  0642 03/03/22  0741   WBC 13.6* 13.3* 13.1*   HGB 8.2* 8.7* 8.5*   HCT 24.9* 27.4* 26.6*   MCV 89.4 91.1 91.0    476* 415     Recent Labs     03/01/22  0732 03/02/22  0641 03/03/22  0741    136 132*   K 4.6 4.7 4.2   CL 97* 96* 94*   CO2 20* 23 21   GLUCOSE 125* 137* 119*   BUN 39* 51* 39*   CREATININE 5.9* 7.7* 5.9*   LABGLOM 10* 7* 10*   GFRAA 12* 9* 12*       Assessment/     ESKD:  On dialysis MWF at [facility].  Failed PD.  Not transplant candidate.  Had fistula ligated due to dialysis associated steal.  He has not reached his target of 116 kg in over 1 month. Doing better here.     Anemia of chronic disease-CKD:  - Hb is below target, on DAVON at the dialysis unit     Hyponatremia:  Due to volume overload in setting of ESRD     Hyperkalemia:  Better with HD     Perineal Pain:  Surgery following and s/p I&D. On broad spectrum IV antibiotics and pain medications    Plan/     - HD tomorrow with UF target as tolerated with crit line monitoring  - Dosing DAVON with HD  - Trend labs, bp's     Palliative care input appreciated  Overall prognosis poor    ____________________________________  Mohan Nash MD  [facility]  [email or web address]  Office: [phone]

## 2022-03-03 NOTE — PROGRESS NOTES
03/03/22 0310   NIV Type   Skin Protection for O2 Device Yes   Location Nose   NIV Started/Stopped On   Equipment Type V60   Mode Bilevel   Mask Type Full face mask   Mask Size Medium   Settings/Measurements   IPAP 16 cmH20   CPAP/EPAP 10 cmH2O   Rate Ordered 12   Resp 13   FiO2  30 %   Vt Exhaled 636 mL   Minute Volume 7.8 Liters   Mask Leak (lpm) 42 lpm   Comfort Level Good   Using Accessory Muscles No   SpO2 96   Breath Sounds   Right Upper Lobe Diminished   Right Middle Lobe Diminished   Right Lower Lobe Diminished   Left Upper Lobe Diminished   Left Lower Lobe Diminished   Alarm Settings   Alarms On Y

## 2022-03-03 NOTE — FLOWSHEET NOTE
3.3.22/Patient Jordon Peterson had a sitter in his room. He is looking forward to going home. Encouraged him to call the  if he needed to talk to someone. 03/03/22 1608   Encounter Summary   Services provided to: Patient   Referral/Consult From: [facility] Family members   Continue Visiting   (3,3,22/Spiritual support with conversation)   Complexity of Encounter Low   Length of Encounter 15 minutes   Routine   Type Follow up   Assessment Calm; Approachable   Intervention Active listening;Nurtured hope;Sustaining presence/ Ministry of presence   Outcome Acceptance;Expressed gratitude;Engaged in conversation   Spiritual/Druze   Type Spiritual support

## 2022-03-03 NOTE — PROGRESS NOTES
For patient safety, an AVASYS camera has been placed in patient's room. AVASYS monitoring needed for Increased Fall Risk. Writer placed call to monitor observer to verify camera number 8 and review patient name, reason for monitoring, and contact information for primary RN. Patient notified of use of remote monitoring upon implementation of camera and verbalized understanding.

## 2022-03-03 NOTE — PROGRESS NOTES
Comprehensive Nutrition Assessment    Type and Reason for Visit:  Reassess    Nutrition Recommendations/Plan:   Continue carb control, low sodium diet  Glucerna with meals  Encourage nutrition  Monitor po intakes, nutrition adequacy, weights, pertinent labs, BMs, education needs    Nutrition Assessment:  Follow up: Pt remains nutritionally compromised d/t report of poor appetite. Pt currently on a carb control, low sodium diet. PO intakes 1-25% per EMR. Pt reports that he has no appetite and does not like the food offered at [facility]. Pt reports that he has been drinking ONS. Will continue. Encouraged po intakes. Pt wife reports that they are going to have to make some changes at home. Pt and wife declined any diet education at this time and state that they have meal plans at home. Will continue to monitor. Malnutrition Assessment:  Malnutrition Status: At risk for malnutrition (Comment)    Context:  Acute Illness     Findings of the 6 clinical characteristics of malnutrition:  Energy Intake:  Mild decrease in energy intake (Comment)    Estimated Daily Nutrient Needs:  Energy (kcal):  6046-2025; Weight Used for Energy Requirements:  Ideal (72 kg)     Protein (g):  72-86; Weight Used for Protein Requirements:  Ideal (1-1.2)        Fluid (ml/day):  1 ml/kcal      Nutrition Related Findings:  -137 mg/dL x 24 hr      Wounds:  Surgical Incision       Current Nutrition Therapies:    ADULT DIET; Regular; 4 carb choices (60 gm/meal);  Low Sodium (2 gm)  ADULT ORAL NUTRITION SUPPLEMENT; Breakfast, Dinner; Diabetic Oral Supplement    Anthropometric Measures:  · Height: 5' 9\" (175.3 cm)  · Current Body Weight: 248 lb 7.3 oz (112.7 kg)   · Admission Body Weight: 266 lb 12.1 oz (121 kg)    · Ideal Body Weight: 160 lbs; % Ideal Body Weight 154.6 %   · BMI: 36.7  · BMI Categories: Obese Class 2 (BMI 35.0 -39.9)       Nutrition Diagnosis:   · Inadequate oral intake related to inadequate protein-energy intake as evidenced by intake 0-25%,intake 26-50%    Nutrition Interventions:   Food and/or Nutrient Delivery:  Continue Current Diet,Continue Oral Nutrition Supplement  Nutrition Education/Counseling:  Education initiated   Coordination of Nutrition Care:  Continue to monitor while inpatient    Goals:  Pt will have PO intakes of 50% or greater during stay.        Nutrition Monitoring and Evaluation:   Behavioral-Environmental Outcomes:  None Identified   Food/Nutrient Intake Outcomes:  Food and Nutrient Intake,Supplement Intake  Physical Signs/Symptoms Outcomes:  Biochemical Data,Nutrition Focused Physical Findings,Weight     Discharge Planning:    Continue current diet     Electronically signed by Don Dawn RD, LD on 3/3/22 at 3:26 PM EST    Contact: [phone]

## 2022-03-03 NOTE — PLAN OF CARE
Problem: Nutrition  Goal: Optimal nutrition therapy  3/3/2022 1527 by Santhosh Cardona RD, LD  Outcome: Ongoing  Note: Nutrition Problem #1: Inadequate oral intake  Intervention: Food and/or Nutrient Delivery: Continue Current Diet,Continue Oral Nutrition Supplement  Nutritional Goals: Pt will have PO intakes of 50% or greater during stay.

## 2022-03-03 NOTE — PLAN OF CARE
Problem: Falls - Risk of:  Goal: Will remain free from falls  Description: Will remain free from falls  Outcome: Ongoing  Goal: Absence of physical injury  Description: Absence of physical injury  Outcome: Ongoing     Problem: Pain:  Goal: Pain level will decrease  Description: Pain level will decrease  Outcome: Ongoing  Goal: Control of acute pain  Description: Control of acute pain  Outcome: Ongoing  Goal: Control of chronic pain  Description: Control of chronic pain  Outcome: Ongoing     Problem: Skin Integrity:  Goal: Will show no infection signs and symptoms  Description: Will show no infection signs and symptoms  Outcome: Ongoing  Goal: Absence of new skin breakdown  Description: Absence of new skin breakdown  Outcome: Ongoing     Problem: HEMODYNAMIC STATUS  Goal: Patient has stable vital signs and fluid balance  Outcome: Ongoing     Problem: ACTIVITY INTOLERANCE/IMPAIRED MOBILITY  Goal: Mobility/activity is maintained at optimum level for patient  Outcome: Ongoing     Problem: COMMUNICATION IMPAIRMENT  Goal: Ability to express needs and understand communication  Outcome: Ongoing     Problem: OXYGENATION/RESPIRATORY FUNCTION  Goal: Patient will maintain patent airway  Outcome: Ongoing  Goal: Patient will achieve/maintain normal respiratory rate/effort  Description: Respiratory rate and effort will be within normal limits for the patient  Outcome: Ongoing     Problem: FLUID AND ELECTROLYTE IMBALANCE  Goal: Fluid and electrolyte balance are achieved/maintained  Outcome: Ongoing

## 2022-03-03 NOTE — PROGRESS NOTES
03/02/22 2154   NIV Type   Skin Assessment Clean, dry, & intact   Skin Protection for O2 Device Yes   Location Nose   Equipment Type V60   Mode Bilevel   Mask Type Full face mask   Mask Size Medium   Settings/Measurements   IPAP 16 cmH20   CPAP/EPAP 10 cmH2O   Resp 13   FiO2  30 %   Vt Exhaled 580 mL   Minute Volume 8 Liters   Mask Leak (lpm) 72 lpm   Comfort Level Good   Using Accessory Muscles No   SpO2 95

## 2022-03-03 NOTE — CONSULTS
RD received consult for diet education. Pt seen on 3/3. Pt and wife declined need for any diet education at this time and state that they have diet education and meal plans at home. Pt has RD contact info. Dietitians following with nutrition assessment and recommendations in RD progress notes. Will continue to monitor per nutrition standards of care.      Electronically signed by Edith Ruiz RD, LD on 3/3/22 at 3:51 PM EST    Contact: [phone]

## 2022-03-04 ENCOUNTER — CARE COORDINATION (OUTPATIENT)
Dept: CASE MANAGEMENT | Age: 52
End: 2022-03-04

## 2022-03-04 ENCOUNTER — FOLLOWUP TELEPHONE ENCOUNTER (OUTPATIENT)
Dept: TELEMETRY | Age: 52
End: 2022-03-04

## 2022-03-04 ENCOUNTER — TELEPHONE (OUTPATIENT)
Dept: FAMILY MEDICINE CLINIC | Age: 52
End: 2022-03-04

## 2022-03-04 NOTE — TELEPHONE ENCOUNTER
Alysa from [facility] would like a verbal OK to follow patient for care after he is released from the hospital.  Please call  [phone] with verbal ok.

## 2022-03-04 NOTE — TELEPHONE ENCOUNTER
First attempt at hospital follow up made without success. Unable to leave VM as VM box is full.  Bassam Jimenez RN

## 2022-03-04 NOTE — CARE COORDINATION
[facility] Transitions Initial Follow Up Call    Call within 2 business days of discharge: Yes    Patient: Cecilia Dunn Patient : 1970   MRN: [id number]  Reason for Admission: SOB   Discharge Date: 3/3/22 RARS: Readmission Risk Score: 48.9 ( )      Last Discharge [facility]       Complaint Diagnosis Description Type Department Provider    22 Shortness of Breath Sepsis without acute organ dysfunction, due to unspecified organism ([address] . .. ED to Hosp-Admission (Discharged) (ADMITTED) Maria Del Carmen Pierre MD; Tricia Castorena Spoke with: Evelyn 141: [facility]    Attempted to reach patient via phone for initial post hospital transition call. Received message mailbox is full. CTN contacted [facility] and spoke with Lashae who verified [address] orders were received.      Care Transitions 24 Hour Call    Do you have all of your prescriptions and are they filled?: Yes  Post Acute Services: [address] Transitions Interventions         Follow Up  Future Appointments   Date Time Provider Nelly Darby   3/24/2022  3:00 PM JAY Armijo - CNP [place] [facility]   3/31/2022  1:40 PM Logan Loera MD AND PULHOMERO NELSONN, RN, [facility]  Care Transition Nurse  [phone] mobile

## 2022-03-07 ENCOUNTER — APPOINTMENT (OUTPATIENT)
Dept: CT IMAGING | Age: 52
End: 2022-03-07
Payer: COMMERCIAL

## 2022-03-07 ENCOUNTER — HOSPITAL ENCOUNTER (EMERGENCY)
Age: 52
Discharge: HOME | End: 2022-03-07
Payer: COMMERCIAL

## 2022-03-07 ENCOUNTER — CARE COORDINATION (OUTPATIENT)
Dept: CASE MANAGEMENT | Age: 52
End: 2022-03-07

## 2022-03-07 ENCOUNTER — TELEPHONE (OUTPATIENT)
Dept: OTHER | Facility: CLINIC | Age: 52
End: 2022-03-07

## 2022-03-07 VITALS
DIASTOLIC BLOOD PRESSURE: 60 MMHG | TEMPERATURE: 97 F | RESPIRATION RATE: 18 BRPM | SYSTOLIC BLOOD PRESSURE: 124 MMHG | OXYGEN SATURATION: 100 % | HEART RATE: 74 BPM

## 2022-03-07 DIAGNOSIS — G89.4 CHRONIC PAIN SYNDROME: ICD-10-CM

## 2022-03-07 DIAGNOSIS — Z53.29 LEFT AGAINST MEDICAL ADVICE: Primary | ICD-10-CM

## 2022-03-07 DIAGNOSIS — L03.317 CELLULITIS OF GLUTEAL REGION: ICD-10-CM
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
## 2022-03-07 DIAGNOSIS — M53.3 ACUTE COCCYGEAL PAIN: ICD-10-CM

## 2022-03-07 LAB
BASOPHILS ABSOLUTE: 0.1 K/UL (ref 0–0.2)
BASOPHILS RELATIVE PERCENT: 0.6 %
EOSINOPHILS ABSOLUTE: 0.4 K/UL (ref 0–0.6)
EOSINOPHILS RELATIVE PERCENT: 2.9 %
HCT VFR BLD CALC: 28 % (ref 40.5–52.5)
HEMOGLOBIN: 8.9 G/DL (ref 13.5–17.5)
LACTIC ACID, SEPSIS: 1.2 MMOL/L (ref 0.4–1.9)
LYMPHOCYTES ABSOLUTE: 0.9 K/UL (ref 1–5.1)
LYMPHOCYTES RELATIVE PERCENT: 7.1 %
MCH RBC QN AUTO: 28.8 PG (ref 26–34)
MCHC RBC AUTO-ENTMCNC: 32 G/DL (ref 31–36)
MCV RBC AUTO: 90.2 FL (ref 80–100)
MONOCYTES ABSOLUTE: 1 K/UL (ref 0–1.3)
MONOCYTES RELATIVE PERCENT: 7.9 %
NEUTROPHILS ABSOLUTE: 10.2 K/UL (ref 1.7–7.7)
NEUTROPHILS RELATIVE PERCENT: 81.5 %
PDW BLD-RTO: 16.7 % (ref 12.4–15.4)
PLATELET # BLD: 350 K/UL (ref 135–450)
PMV BLD AUTO: 7.5 FL (ref 5–10.5)
RBC # BLD: 3.1 M/UL (ref 4.2–5.9)
REASON FOR REJECTION: NORMAL
REJECTED TEST: NORMAL
WBC # BLD: 12.5 K/UL (ref 4–11)

## 2022-03-07 PROCEDURE — 85025 COMPLETE CBC W/AUTO DIFF WBC: CPT

## 2022-03-07 PROCEDURE — 99284 EMERGENCY DEPT VISIT MOD MDM: CPT

## 2022-03-07 PROCEDURE — 6370000000 HC RX 637 (ALT 250 FOR IP): Performed by: NURSE PRACTITIONER

## 2022-03-07 PROCEDURE — 83605 ASSAY OF LACTIC ACID: CPT

## 2022-03-07 PROCEDURE — 72192 CT PELVIS W/O DYE: CPT

## 2022-03-07 RX ORDER — DOXYCYCLINE HYCLATE 100 MG
100 TABLET ORAL 2 TIMES DAILY
Qty: 20 TABLET | Refills: 0 | Status: ON HOLD | OUTPATIENT
Start: 2022-03-07 | End: 2022-03-22 | Stop reason: HOSPADM

## 2022-03-07 RX ORDER — FENTANYL CITRATE 50 UG/ML
50 INJECTION, SOLUTION INTRAMUSCULAR; INTRAVENOUS ONCE
Status: DISCONTINUED | OUTPATIENT
Start: 2022-03-07 | End: 2022-03-07 | Stop reason: HOSPADM

## 2022-03-07 RX ORDER — OXYCODONE AND ACETAMINOPHEN 7.5; 325 MG/1; MG/1
1 TABLET ORAL EVERY 6 HOURS PRN
Qty: 120 TABLET | Refills: 0 | Status: SHIPPED | OUTPATIENT
Start: 2022-03-07 | End: 2022-04-06 | Stop reason: SDUPTHER

## 2022-03-07 RX ORDER — OXYCODONE AND ACETAMINOPHEN 10; 325 MG/1; MG/1
1 TABLET ORAL ONCE
Status: COMPLETED | OUTPATIENT
Start: 2022-03-07 | End: 2022-03-07

## 2022-03-07 RX ORDER — CYCLOBENZAPRINE HCL 10 MG
TABLET ORAL
Qty: 90 TABLET | Refills: 2 | Status: SHIPPED | OUTPATIENT
Start: 2022-03-07 | End: 2022-04-28

## 2022-03-07 RX ORDER — ONDANSETRON 2 MG/ML
4 INJECTION INTRAMUSCULAR; INTRAVENOUS EVERY 30 MIN PRN
Status: DISCONTINUED | OUTPATIENT
Start: 2022-03-07 | End: 2022-03-07 | Stop reason: HOSPADM

## 2022-03-07 RX ORDER — MORPHINE SULFATE 4 MG/ML
4 INJECTION, SOLUTION INTRAMUSCULAR; INTRAVENOUS EVERY 30 MIN PRN
Status: DISCONTINUED | OUTPATIENT
Start: 2022-03-07 | End: 2022-03-07

## 2022-03-07 RX ADMIN — OXYCODONE HYDROCHLORIDE AND ACETAMINOPHEN 1 TABLET: 10; 325 TABLET ORAL at 16:15

## 2022-03-07 ASSESSMENT — PAIN SCALES - GENERAL
PAINLEVEL_OUTOF10: 8

## 2022-03-07 ASSESSMENT — PAIN DESCRIPTION - LOCATION: LOCATION: BUTTOCKS

## 2022-03-07 NOTE — ED PROVIDER NOTES
Evaluated by [id number] [facility] Provider    [address] [facility]  ED    CHIEF COMPLAINT  Back Pain (Patient presents to ED5, via [facility], c/o tailbone pain s/p fall last week and again today, patient states misstepped)    HISTORY OF PRESENT ILLNESS  Favio Weston is a 46 y.o. male who presents to the ED complaining of low back/tailbone pain. Judi Bledsoe today. Recently admitted for SOB and darrick-rectal abscess. States he fell while he was here in the hospital. Reports the pain in the rectal area is worsening but now since the fall today is intense. States he lost his footing today and it is what caused his fall. He is a dialysis patient, did not make it to the appointment. States his breathing is fine, he is a little heavy since he did not get dialysis. Nephrologist Dr. Betzaida Grewal was in the room on my exam.    Today he denies SOB, CP,  Nausea, vomiting. He did not hit his head when he fell. Has not taken anything for the pain. The patient is currently rating their pain as 10/10 and describes it as an aching type of pain. The patient arrived to the ED via EMS transport. Nursing notes reviewed.    Past Medical History:   Diagnosis Date    Ambulatory dysfunction     walker for long distances, SOB with distance    Aortic stenosis     echo 2017    Arthritis     hands and hips    Asthma     Bilateral hilar adenopathy syndrome 6/3/2013    CAD (coronary artery disease)     Dr. Kaitlynn Mac [facility]) 04/19/2019    EF= 43%    CHF (congestive heart failure) (HCC)     Chronic pain     COPD (chronic obstructive pulmonary disease) ([address]     pulmonology Dr. April Brink    Depression     Diabetes mellitus ([address]     borderline    Difficult intravenous access     Emphysema of lung ([address]     ESRD (end stage renal disease) on dialysis ([address]     MWF    Fear of needles     Gastric ulcer     GERD (gastroesophageal reflux disease)     Heart valve problem     bicuspic valve    Hemodialysis patient [facility])     History of spinal fracture     work incident    Hx of blood clots     Bilateral lower extremities; stents in place    Hyperlipidemia     Hypertension     MI (myocardial infarction) ([address] 2019    has had 9 MIs. 2019 was the last    Neuromuscular disorder ([address]     due to CVA    Numbness and tingling in left arm     from fistula    Pneumonia     PONV (postoperative nausea and vomiting)     Prolonged emergence from general anesthesia     States requires more medication than most people    Sleep apnea     Uses CPAP    Stroke ([address]     7mm thalamic cva 2017 deficts left side, left side weakness    TIA (transient ischemic attack)     Unspecified diseases of blood and blood-forming organs      Past Surgical History:   Procedure Laterality Date    AORTIC VALVE REPLACEMENT N/A 10/15/2019    TRANSCATHETER AORTIC VALVE REPLACEMENT FEMORAL APPROACH performed by C[facility]ido [facility] Right 7/2/2019    PERITONEAL DIALYSIS CATHETER REMOVAL performed by Jesus Matt MD at [facility] COLONOSCOPY  2/29/2015    WNL    CORONARY ANGIOPLASTY WITH STENT PLACEMENT  05/26/15    CYST REMOVAL  08/14/2013    EXCISION CYSTS, NECK X2 AND ABDOMINAL benign    DIAGNOSTIC CARDIAC CATH LAB PROCEDURE      DIALYSIS FISTULA CREATION Left 10/30/2017    LEFT BRACHIAL CEPHALIC FISTULA    DIALYSIS FISTULA CREATION Left 3/27/2019    LIGATION  AV FISTULA performed by Kishore Mcbride MD at [facility]. [address], [address], DIAGNOSTIC      OTHER SURGICAL HISTORY  02/01/2017    laparoscopic cholecystectomy with intraoperative cholangiogram    OTHER SURGICAL HISTORY  2018    PORT PLACEMENT  - vas cath    OTHER SURGICAL HISTORY Bilateral 06/26/2018    laprascopic peritoneal dialysis catheter placement    OTHER SURGICAL HISTORY Right 09/2018    peritoneal dialysis port placed on right side of abdomen    OTHER SURGICAL HISTORY  05/28/2019    PTA/Stenting R External Iliac artery    NH LAP INSERTION TUNNELED INTRAPERITONEAL CATHETER N/A 2018    LAPAROSCOPIC PERITONEAL DIALYSIS CATHETER REPLACEMENT performed by Connie Doss MD at [facility] N/A 2022    PERINEAL ABCESS D[facility]IN[facility]  [facility]    UPPER GASTROINTESTINAL ENDOSCOPY  2017    possible candida, otherwise normal appearing    VASCULAR SURGERY  aprx 2 years ago    2 stents placed, each side of groin     Family History   Problem Relation Age of Onset    Diabetes Mother     Heart Disease Father     Kidney Disease Sister         stage 4-kidney failure    Cancer Sister     Heart Disease Sister     Obesity Sister     Cancer Sister     Heart Disease Sister     Obesity Sister     Alcohol Abuse Brother      Social History     Socioeconomic History    Marital status:      Spouse name: Not on file    Number of children: Not on file    Years of education: Not on file    Highest education level: Not on file   Occupational History    Not on file   Tobacco Use    Smoking status: Former Smoker     Packs/day: 0.50     Years: 33.00     Pack years: 16.50     Types: Cigarettes     Quit date: 2020     Years since quittin.8    Smokeless tobacco: Never Used    Tobacco comment: [facility] quit 2021   Vaping Use    Vaping Use: Never used   Substance and Sexual Activity    Alcohol use: Not Currently     Alcohol/week: 0.0 standard drinks     Comment: occ    Drug use: No    Sexual activity: Yes     Partners: Female     Comment:    Other Topics Concern    Not on file   Social History Narrative    Not on file     Social Determinants of Health     Financial Resource Strain:     Difficulty of Paying Living Expenses: Not on file   Food Insecurity:     Worried About [address] in the Last Year: Not on file    [address] in the Last Year: Not on file   Transportation Needs:     Lack of Transportation (Medical): Not on file    Lack of Transportation (Non-Medical): Not on file   Physical Activity:     Days of Exercise per Week: Not on file    Minutes of Exercise per Session: Not on file   Stress:     Feeling of Stress : Not on file   Social Connections:     Frequency of Communication with Friends and Family: Not on file    Frequency of Social Gatherings with Friends and Family: Not on file    Attends Rastafari Services: Not on file    Active Member of [address] [place] [address] [employer] or Organizations: Not on file    Attends Club or Organization Meetings: Not on file    Marital Status: Not on file   Intimate Partner Violence:     Fear of Current or Ex-Partner: Not on file    Emotionally Abused: Not on file    Physically Abused: Not on file    Sexually Abused: Not on file   Housing Stability:     Unable to Pay for Housing in the Last Year: Not on file    Number of [place] in the Last Year: Not on file    Unstable Housing in the Last Year: Not on file     No current facility-administered medications for this encounter. Current Outpatient Medications   Medication Sig Dispense Refill    doxycycline hyclate (VIBRA-TABS) 100 MG tablet Take 1 tablet by mouth 2 times daily for 10 days 20 tablet 0    oxyCODONE-acetaminophen (PERCOCET) 7.5-325 MG per tablet Take 1 tablet by mouth every 6 hours as needed (pain) for up to 30 days.  120 tablet 0    cyclobenzaprine (FLEXERIL) 10 MG tablet TAKE 1 TABLET BY MOUTH EVERY 8 HOURS AS NEEDED 90 tablet 2    metoprolol succinate (TOPROL XL) 50 MG extended release tablet Take 1 tablet by mouth in the morning and at bedtime 60 tablet 1    apixaban (ELIQUIS) 5 MG TABS tablet Take 1 tablet by mouth 2 times daily 60 tablet 1    pantoprazole (PROTONIX) 40 MG tablet TAKE 1 TABLET BY MOUTH EVERY MORNING BEFORE BREAKFAST 30 tablet 1    pravastatin (PRAVACHOL) 40 MG tablet Take 1 tablet by mouth daily 90 tablet 3    clopidogrel (PLAVIX) 75 MG tablet Take 1 tablet by mouth daily 90 tablet 3    QUEtiapine (SEROQUEL) 50 MG tablet TAKE 1 TABLET BY MOUTH IN THE EVENING 30 tablet 2    fluticasone (FLONASE) 50 MCG/ACT nasal spray SHAKE LIQUID AND USE 2 SPRAYS IN EACH NOSTRIL DAILY 48 g 0    insulin glargine (BASAGLAR KWIKPEN) 100 UNIT/ML injection pen Inject 30 Units into the skin nightly 15 mL 1    gabapentin (NEURONTIN) 100 MG capsule TAKE 1-2 CAPSULES BY MOUTH THREE TIMES A  capsule 5     MG capsule TAKE 1 CAPSULE BY MOUTH TWICE DAILY 60 capsule 5    Continuous Blood Gluc Sensor (DEXCOM G6 SENSOR) MISC Every 10 days 9 each 3    Continuous Blood Gluc Transmit (DEXCOM G6 TRANSMITTER) MISC 1 each by Does not apply route every 3 months 1 each 3    Continuous Blood Gluc  (DEXCOM G6 ) ADAM 1 each by Does not apply route Daily with lunch 1 each 0    DULoxetine (CYMBALTA) 60 MG extended release capsule TAKE 1 CAPSULE BY MOUTH EVERY DAY 30 capsule 5    traZODone (DESYREL) 150 MG tablet TAKE ONE (1) TABLET BY MOUTH NIGHTLY 30 tablet 10    B Complex-C-Folic Acid (VIRT-CAPS) 1 MG CAPS TAKE 1 CAPSULE BY MOUTH EVERY DAY 90 capsule 1    Calcium Acetate, Phos Binder, 667 MG CAPS TAKE 1 CAPSULE BY MOUTH THREE TIMES DAILY WITH MEALS 90 capsule 3    LINZESS 145 MCG capsule TAKE 1 CAPSULE BY MOUTH EVERY MORNING BEFORE BREAKFAST 30 capsule 10    nitroGLYCERIN (NITROSTAT) 0.4 MG SL tablet DISSOLVE 1 TABLET UNDER THE TONGUE AS NEEDED FOR CHEST PAIN EVERY 5 MINUTES UP TO 3 TIMES.  IF NO RELIEF CALL 911. 25 tablet 10    insulin aspart (NOVOLOG FLEXPEN) 100 UNIT/ML injection pen Inject 20 Units into the skin 3 times daily (before meals) 15 pen 5    vitamin D (ERGOCALCIFEROL) 25717 units CAPS capsule TK 1 C PO WEEKLY  11    Tiotropium Bromide-Olodaterol (STIOLTO RESPIMAT) 2.5-2.5 MCG/ACT AERS Inhale 2 puffs into the lungs daily 2 Inhaler 0    Polyethylene Glycol 3350 GRAN       Blood Glucose Monitoring Suppl ADAM USE AS DIRECTED. 1 Device 0    Alcohol Swabs PADS USE AS DIRECTED 300 each 3    albuterol sulfate  (90 Base) MCG/ACT inhaler Inhale 2 puffs into the lungs every 6 hours as needed for Wheezing 1 Inhaler 3    ipratropium-albuterol (DUONEB) 0.5-2.5 (3) MG/3ML SOLN nebulizer solution Inhale 3 mLs into the lungs every 6 hours as needed for Shortness of Breath 360 mL 1    calcium carbonate (TUMS) 500 MG chewable tablet Take 1 tablet by mouth 3 times daily as needed for Heartburn. Allergies   Allergen Reactions    Morphine Nausea And Vomiting     REVIEW OF SYSTEMS    10 systems reviewed, pertinent positives per HPI otherwise noted to be negative    PHYSICAL EXAM  /60   Pulse 74   Temp 97 °F (36.1 °C) (Oral)   Resp 18   SpO2 100%   GENERAL APPEARANCE: Patient is well-developed, obese. Awake and alert. No apparent distress. HEENT: Normocephalic, atraumatic. Conjunctiva appear normal. Sclera is non-icteric. External ears are normal.  Mucous membranes moist.  EYES: EOM's grossly intact. NECK: Supple with normal ROM. Trachea midline   CARDIOVASCULAR:  Regular rate and rhythm. Brisk capillary refill. LUNGS: Equal symmetric chest rise. Breathing is unlabored. Speaking comfortably in full sentences. Abdomen: Soft, Nondistended, nontender to palpation. There is no pulsatile mass to palpation. No masses or hepatosplenomegaly. EXTREMITIES: Normal ROM, no edema, no tenderness, or deformity. Distal pulses palpable. No gross deformities or trauma noted. Moving all extremities equally and appropriately. BACK: On exam of thoracic and lumbar/coccyx spine, there is no swelling, bruising, or color change noted. There is thoracic and coccyx midline bony tenderness, without crepitus, deformity, or step off. Patient exhibits no tenderness of paraspinal musculature to either side of midline. SKIN: Warm and dry. Skin is intact. No rashes/lesions noted. NEUROLOGICAL: Alert and oriented.  Normal strength (5/5 in all extremities) and sensation is intact. PSYCHIATRIC: Mood and affect appropriate. Speech is clear and articulate. LABS  Results for orders placed or performed during the hospital encounter of 03/07/22   CBC with Auto Differential   Result Value Ref Range    WBC 12.5 (H) 4.0 - 11.0 K/uL    RBC 3.10 (L) 4.20 - 5.90 M/uL    Hemoglobin 8.9 (L) 13.5 - 17.5 g/dL    Hematocrit 28.0 (L) 40.5 - 52.5 %    MCV 90.2 80.0 - 100.0 fL    MCH 28.8 26.0 - 34.0 pg    MCHC 32.0 31.0 - 36.0 g/dL    RDW 16.7 (H) 12.4 - 15.4 %    Platelets 965 491 - 200 K/uL    MPV 7.5 5.0 - 10.5 fL    Neutrophils % 81.5 %    Lymphocytes % 7.1 %    Monocytes % 7.9 %    Eosinophils % 2.9 %    Basophils % 0.6 %    Neutrophils Absolute 10.2 (H) 1.7 - 7.7 K/uL    Lymphocytes Absolute 0.9 (L) 1.0 - 5.1 K/uL    Monocytes Absolute 1.0 0.0 - 1.3 K/uL    Eosinophils Absolute 0.4 0.0 - 0.6 K/uL    Basophils Absolute 0.1 0.0 - 0.2 K/uL   Lactate, Sepsis   Result Value Ref Range    Lactic Acid, Sepsis 1.2 0.4 - 1.9 mmol/L   SPECIMEN REJECTION   Result Value Ref Range    Rejected Test cmpx     Reason for Rejection see below      RADIOLOGY  CT PELVIS WO CONTRAST Additional Contrast? None    Result Date: 3/7/2022  EXAMINATION: CT OF THE PELVIS WITHOUT CONTRAST 3/7/2022 4:15 pm TECHNIQUE: CT of the pelvis was performed without the administration of intravenous contrast.  Multiplanar reformatted images are provided for review. Adjustment of mA and/or kV according to patient size was utilized. Dose modulation, iterative reconstruction, and/or weight based adjustment of the mA/kV was utilized to reduce the radiation dose to as low as reasonably achievable. COMPARISON: CT pelvis 02/21/2022.  HISTORY: ORDERING SYSTEM PROVIDED HISTORY: fall, pain in the coccyx, has history of darrick-rectal abscess TECHNOLOGIST PROVIDED HISTORY: Reason for exam:->fall, pain in the coccyx, has history of darrick-rectal abscess Additional Contrast?->None Decision Support Exception - unselect if not a suspected or confirmed emergency medical condition->Emergency Medical Condition (MA) Reason for Exam: patient fell on back earlier today, pain in posterior pelvis FINDINGS: The visualized lower lumbar spine is unremarkable. No coccygeal abnormality identified. The bilateral sacroiliac joints, pubic symphysis, and bilateral hips are unremarkable. No fracture or dislocation identified. No suspicious lytic or blastic osseous lesion. No osseous erosion. A small partially visualized ovoid fluid collection in the right perineum is unchanged from the previous study. There is a new area of irregular fluid density in the right inferior gluteal region measuring approximately 9.7 x 1.5 cm on axial image 99. No soft tissue gas or foreign body. The urinary bladder is normal in appearance. The prostate gland is unremarkable. The visualized bowel is unremarkable. No free fluid. No pelvic or inguinal lymphadenopathy. 1. No acute osseous abnormality. In particular, no abnormality of the coccyx is seen. 2. New large area of irregular fluid density within the inferior medial right gluteal subcutaneous fat measuring approximately 9.7 x 1.5 cm suggesting phlegmon or developing abscess. ED COURSE/MDM  Patient seen and evaluated. Old records reviewed. I have evaluated this patient. My supervising physician was available for consultation. Maurice Palomino presented to the ED today with above noted complaints. Arrival vital signs: Afebrile, hemodynamically stable. Well saturated on RA. Physical exam performed at 1554: Tenderness to palpation of the mid thoracic spine and sacrum/coccyx on palpation. Blood work: Leukocytosis is present as WBC elevated at 12.5, absolute neutrophils elevated at 10.2, absolute lymphocytes low at 0.9. No further differential shift. Stable anemia. Lactic acid level is normal. CMP rejected.     Imaging: CT of the pelvis shows no acute osseous abnormality in particular of the coccyx. New large area of irregular fluid density within the inferior medial right gluteal subcutaneous fat measuring approximately 9.7 x 1.5 cm suggesting phlegmon or developing abscess. Medications given in the ED:   Medications   oxyCODONE-acetaminophen (PERCOCET)  MG per tablet 1 tablet (1 tablet Oral Given 3/7/22 1615)      Patient reported the percocet was not helping his pain. I ordered morphine and he reported an allergy to this, which is actually nausea and vomiting. I changed it to fentanyl. Nurse then approached me and said he left. She went to his room and he was gone, he was not found anywhere else in the ER. At [address] [facility] I called the patient's phone and he did not answer and mailbox was full. I called his wife and spoke with her, she said he had called and said to come get him he was done. Their son had come to pick him up and she would have him call when he got home. At 1908 he called the ER but hung up before I could answer. I called him back on his cell phone. I asked him why he left and he said because he was getting any service. I explained that his labs were taking a while to come back and that we actually needed to repeat some. I also reviewed his CT scan results. I advised him without his blood work we could not assess his electrolytes which his nephrologist specifically wanted. I asked what he planned on doing about dialysis and he said he was going to go to have his run. I am calling in doxycycline for the cellulitis seen in the right gluteal area. Advised him on obtaining this. Cinical Impression    1. Left against medical advice    2. Cellulitis of gluteal region    3. Acute coccygeal pain    4. Fall, initial encounter        Blood pressure 124/60, pulse 74, temperature 97 °F (36.1 °C), temperature source Oral, resp. rate 18, SpO2 100 %. FOLLOW UP  Court Harada, MD  [address] [place] [address].   [address] [place] [address] [place]  [phone]    Call MD Shane Gutierrez [address]  [address] [facility] [id number] [phone]    Call         DISPOSITION  AMA    Comment: Please note this report has been produced using speech recognition software and may contain errors related to that system including errors in grammar, punctuation, and spelling, as well as words and phrases that may be inappropriate. If there are any questions or concerns please feel free to contact the dictating provider for clarification.         JAY Stearns - ILAN  03/07/22 [id number]

## 2022-03-07 NOTE — TELEPHONE ENCOUNTER
Writer contacted [facility] [place] ED provider to inform of 30 day readmission risk. Writer's attempt to contact ED provider was unsuccessful.

## 2022-03-07 NOTE — TELEPHONE ENCOUNTER
Patient is requesting a rx for   cyclobenzaprine (FLEXERIL) 10 MG tablet  Sent to [facility]       1/21/22 [facility] f/u  Next office visit   Visit date not found

## 2022-03-07 NOTE — TELEPHONE ENCOUNTER
Last Office Visit  -  1/18/22  Next Office Visit  -  n/a    Last Filled  -  2/7/22  Last UDS -  n/a  Contract -  N/a

## 2022-03-07 NOTE — TELEPHONE ENCOUNTER
----- Message from Nilda Swift sent at 3/5/2022  8:04 AM EST -----  Subject: Refill Request    QUESTIONS  Name of Medication? oxyCODONE-acetaminophen (PERCOCET) 7.5-325 MG per   tablet  Patient-reported dosage and instructions? 5-6 a day   How many days do you have left? 0  Preferred Pharmacy? [id number]  Pharmacy phone number (if available)? [phone]  ---------------------------------------------------------------------------  --------------  Sia YAN  What is the best way for the office to contact you? OK to leave message on   voicemail  Preferred Call Back Phone Number?  [phone]

## 2022-03-07 NOTE — CARE COORDINATION
[address] Transitions Initial Follow Up Call    Call within 2 business days of discharge: Yes    Patient: Irene Francisco Patient : 1970   MRN: [id number]  Reason for Admission: SOB   Discharge Date: 3/3/22 RARS: Readmission Risk Score: 48.9 ( )      Last Discharge [facility]       Complaint Diagnosis Description Type Department Provider    22 Shortness of Breath Sepsis without acute organ dysfunction, due to unspecified organism ([facility] [address] [address] . .. ED to Hosp-Admission (Discharged) (ADMITTED) Tiago Romo MD; Az Alves.. Final attempt made to reach patient for post hospital follow up call. Left a voice message for patient with my contact information and informed of final outreach attempt.            Care Transitions 24 Hour Call    Do you have all of your prescriptions and are they filled?: Yes  Post Acute Services: [address] Transitions Interventions         Follow Up  Future Appointments   Date Time Provider Nelly Darby   3/24/2022  3:00 PM JAY Steele - ILAN [facility]   3/31/2022  1:40 PM Lee Kimbrough MD AND PULM [facility]     Gerri RALPH, RN, [facility]  Care Transition Nurse  [phone] mobile

## 2022-03-08 NOTE — PROGRESS NOTES
Telesitter  alarmed the charge nurse and the PCA rushed in to the room and found the patient trying to pull out the vas cath . The restrained the patient as they were waiting for the orders , This RN perfect served the hospital ist  To come review the pt. Within 15 minutes the NP reviewed the patient and said pt to be put on BIPAP , the restrains were discontinued . We continued to monitor patient

## 2022-03-09 ENCOUNTER — TELEPHONE (OUTPATIENT)
Dept: FAMILY MEDICINE CLINIC | Age: 52
End: 2022-03-09

## 2022-03-09 DIAGNOSIS — K59.00 CONSTIPATION, UNSPECIFIED CONSTIPATION TYPE: Primary | ICD-10-CM

## 2022-03-09 RX ORDER — SENNA AND DOCUSATE SODIUM 50; 8.6 MG/1; MG/1
1 TABLET, FILM COATED ORAL DAILY
Qty: 10 TABLET | Refills: 0 | Status: SHIPPED | OUTPATIENT
Start: 2022-03-09

## 2022-03-09 RX ORDER — MAGNESIUM CARB/ALUMINUM HYDROX 105-160MG
30 TABLET,CHEWABLE ORAL DAILY PRN
Qty: 300 ML | Refills: 0 | Status: SHIPPED | OUTPATIENT
Start: 2022-03-09 | End: 2022-11-01

## 2022-03-09 RX ORDER — MAG HYDROX/ALUMINUM HYD/SIMETH 400-400-40
1 SUSPENSION, ORAL (FINAL DOSE FORM) ORAL ONCE
Qty: 1 SUPPOSITORY | Refills: 0 | Status: SHIPPED | OUTPATIENT
Start: 2022-03-09 | End: 2022-03-09

## 2022-03-09 NOTE — TELEPHONE ENCOUNTER
Received call from Pt stating that he hasn't been able to have a bowel movement in 7 days. Pt has some mild stomach pain.      Please advise

## 2022-03-10 ENCOUNTER — TELEPHONE (OUTPATIENT)
Dept: PALLATIVE CARE | Age: 52
End: 2022-03-10

## 2022-03-10 NOTE — TELEPHONE ENCOUNTER
Received call from wife. She states she needs help with patient. He is being cruel to her, not taking all of his medicine as he is supposed to and not eating healthy foods as he should be. He has missed a dialysis appointment. He finally did have a bowel movement. She says she feels safe at home with him. She is just frustrated with him. Needs help getting him to do what he is supposed to do. She has not heard from home palliative care yet.   I told her I would reach out to them and make sure they contacted her as their help and support could be very beneficial.    Called [facility], left voice mail for Beatrice Torres updating them on patient's wife call

## 2022-03-17 ENCOUNTER — HOSPITAL ENCOUNTER (INPATIENT)
Age: 52
LOS: 5 days | Discharge: HOME | DRG: 673 | End: 2022-03-22
Attending: STUDENT IN AN ORGANIZED HEALTH CARE EDUCATION/TRAINING PROGRAM | Admitting: HOSPITALIST
Payer: COMMERCIAL

## 2022-03-17 ENCOUNTER — APPOINTMENT (OUTPATIENT)
Dept: GENERAL RADIOLOGY | Age: 52
DRG: 673 | End: 2022-03-17
Payer: COMMERCIAL

## 2022-03-17 DIAGNOSIS — R77.8 ELEVATED TROPONIN: ICD-10-CM

## 2022-03-17 DIAGNOSIS — R79.89 ELEVATED BRAIN NATRIURETIC PEPTIDE (BNP) LEVEL: ICD-10-CM

## 2022-03-17 DIAGNOSIS — I16.0 HYPERTENSIVE URGENCY: ICD-10-CM

## 2022-03-17 DIAGNOSIS — R06.00 DYSPNEA AND RESPIRATORY ABNORMALITIES: ICD-10-CM

## 2022-03-17 DIAGNOSIS — N18.6 ESRD (END STAGE RENAL DISEASE) (HCC): Primary | ICD-10-CM

## 2022-03-17 DIAGNOSIS — R06.89 DYSPNEA AND RESPIRATORY ABNORMALITIES: ICD-10-CM

## 2022-03-17 PROBLEM — I50.30 PULMONARY EDEMA WITH DIASTOLIC CHF, NYHA CLASS 3 (HCC): Status: ACTIVE | Noted: 2022-03-17

## 2022-03-17 LAB
A/G RATIO: 1.3 (ref 1.1–2.2)
ALBUMIN SERPL-MCNC: 3.8 G/DL (ref 3.4–5)
ALBUMIN SERPL-MCNC: 4.2 G/DL (ref 3.4–5)
ALP BLD-CCNC: 153 U/L (ref 40–129)
ALT SERPL-CCNC: 7 U/L (ref 10–40)
ANION GAP SERPL CALCULATED.3IONS-SCNC: 15 MMOL/L (ref 3–16)
ANION GAP SERPL CALCULATED.3IONS-SCNC: 19 MMOL/L (ref 3–16)
AST SERPL-CCNC: 12 U/L (ref 15–37)
BASE EXCESS VENOUS: -2.4 MMOL/L (ref -3–3)
BASE EXCESS VENOUS: -3 MMOL/L (ref -3–3)
BASOPHILS ABSOLUTE: 0.1 K/UL (ref 0–0.2)
BASOPHILS RELATIVE PERCENT: 0.9 %
BILIRUB SERPL-MCNC: 0.3 MG/DL (ref 0–1)
BUN BLDV-MCNC: 76 MG/DL (ref 7–20)
BUN BLDV-MCNC: 84 MG/DL (ref 7–20)
CALCIUM SERPL-MCNC: 8.4 MG/DL (ref 8.3–10.6)
CALCIUM SERPL-MCNC: 9 MG/DL (ref 8.3–10.6)
CARBOXYHEMOGLOBIN: 1.8 % (ref 0–1.5)
CARBOXYHEMOGLOBIN: 4.7 % (ref 0–1.5)
CHLORIDE BLD-SCNC: 88 MMOL/L (ref 99–110)
CHLORIDE BLD-SCNC: 93 MMOL/L (ref 99–110)
CO2: 20 MMOL/L (ref 21–32)
CO2: 22 MMOL/L (ref 21–32)
CREAT SERPL-MCNC: 10.1 MG/DL (ref 0.9–1.3)
CREAT SERPL-MCNC: 9.8 MG/DL (ref 0.9–1.3)
EOSINOPHILS ABSOLUTE: 0.6 K/UL (ref 0–0.6)
EOSINOPHILS RELATIVE PERCENT: 5.8 %
GFR AFRICAN AMERICAN: 7
GFR AFRICAN AMERICAN: 7
GFR NON-AFRICAN AMERICAN: 5
GFR NON-AFRICAN AMERICAN: 6
GLUCOSE BLD-MCNC: 137 MG/DL (ref 70–99)
GLUCOSE BLD-MCNC: 234 MG/DL (ref 70–99)
GLUCOSE BLD-MCNC: 92 MG/DL (ref 70–99)
HCO3 VENOUS: 21.3 MMOL/L (ref 23–29)
HCO3 VENOUS: 21.5 MMOL/L (ref 23–29)
HCT VFR BLD CALC: 32.7 % (ref 40.5–52.5)
HEMOGLOBIN: 10.7 G/DL (ref 13.5–17.5)
LACTIC ACID, SEPSIS: 1.2 MMOL/L (ref 0.4–1.9)
LYMPHOCYTES ABSOLUTE: 1.1 K/UL (ref 1–5.1)
LYMPHOCYTES RELATIVE PERCENT: 10.2 %
MCH RBC QN AUTO: 29 PG (ref 26–34)
MCHC RBC AUTO-ENTMCNC: 32.7 G/DL (ref 31–36)
MCV RBC AUTO: 88.5 FL (ref 80–100)
METHEMOGLOBIN VENOUS: 0.3 %
METHEMOGLOBIN VENOUS: 0.4 %
MONOCYTES ABSOLUTE: 0.7 K/UL (ref 0–1.3)
MONOCYTES RELATIVE PERCENT: 5.8 %
NEUTROPHILS ABSOLUTE: 8.6 K/UL (ref 1.7–7.7)
NEUTROPHILS RELATIVE PERCENT: 77.3 %
O2 SAT, VEN: 73 %
O2 SAT, VEN: 96 %
O2 THERAPY: ABNORMAL
O2 THERAPY: ABNORMAL
PCO2, VEN: 34.1 MMHG (ref 40–50)
PCO2, VEN: 35.2 MMHG (ref 40–50)
PDW BLD-RTO: 16.9 % (ref 12.4–15.4)
PERFORMED ON: NORMAL
PH VENOUS: 7.4 (ref 7.35–7.45)
PH VENOUS: 7.42 (ref 7.35–7.45)
PHOSPHORUS: 5.3 MG/DL (ref 2.5–4.9)
PLATELET # BLD: 381 K/UL (ref 135–450)
PMV BLD AUTO: 7.1 FL (ref 5–10.5)
PO2, VEN: 38.7 MMHG (ref 25–40)
PO2, VEN: 86.2 MMHG (ref 25–40)
POTASSIUM REFLEX MAGNESIUM: 5.6 MMOL/L (ref 3.5–5.1)
POTASSIUM SERPL-SCNC: 4.8 MMOL/L (ref 3.5–5.1)
PRO-BNP: ABNORMAL PG/ML (ref 0–124)
RBC # BLD: 3.69 M/UL (ref 4.2–5.9)
SODIUM BLD-SCNC: 127 MMOL/L (ref 136–145)
SODIUM BLD-SCNC: 130 MMOL/L (ref 136–145)
TCO2 CALC VENOUS: 22 MMOL/L
TCO2 CALC VENOUS: 23 MMOL/L
TOTAL PROTEIN: 7.4 G/DL (ref 6.4–8.2)
TROPONIN: 0.11 NG/ML
TROPONIN: 0.11 NG/ML
TROPONIN: 0.12 NG/ML
WBC # BLD: 11.2 K/UL (ref 4–11)

## 2022-03-17 PROCEDURE — 83550 IRON BINDING TEST: CPT

## 2022-03-17 PROCEDURE — 1200000000 HC SEMI PRIVATE

## 2022-03-17 PROCEDURE — 94761 N-INVAS EAR/PLS OXIMETRY MLT: CPT

## 2022-03-17 PROCEDURE — 99285 EMERGENCY DEPT VISIT HI MDM: CPT

## 2022-03-17 PROCEDURE — 6370000000 HC RX 637 (ALT 250 FOR IP): Performed by: HOSPITALIST

## 2022-03-17 PROCEDURE — 80053 COMPREHEN METABOLIC PANEL: CPT

## 2022-03-17 PROCEDURE — 2700000000 HC OXYGEN THERAPY PER DAY

## 2022-03-17 PROCEDURE — 36415 COLL VENOUS BLD VENIPUNCTURE: CPT

## 2022-03-17 PROCEDURE — 82803 BLOOD GASES ANY COMBINATION: CPT

## 2022-03-17 PROCEDURE — 85025 COMPLETE CBC W/AUTO DIFF WBC: CPT

## 2022-03-17 PROCEDURE — 84439 ASSAY OF FREE THYROXINE: CPT

## 2022-03-17 PROCEDURE — 83540 ASSAY OF IRON: CPT

## 2022-03-17 PROCEDURE — 6370000000 HC RX 637 (ALT 250 FOR IP): Performed by: PHYSICIAN ASSISTANT

## 2022-03-17 PROCEDURE — 84443 ASSAY THYROID STIM HORMONE: CPT

## 2022-03-17 PROCEDURE — 83036 HEMOGLOBIN GLYCOSYLATED A1C: CPT

## 2022-03-17 PROCEDURE — 83880 ASSAY OF NATRIURETIC PEPTIDE: CPT

## 2022-03-17 PROCEDURE — 71045 X-RAY EXAM CHEST 1 VIEW: CPT

## 2022-03-17 PROCEDURE — 6360000002 HC RX W HCPCS: Performed by: HOSPITALIST

## 2022-03-17 PROCEDURE — 2580000003 HC RX 258: Performed by: HOSPITALIST

## 2022-03-17 PROCEDURE — 84484 ASSAY OF TROPONIN QUANT: CPT

## 2022-03-17 PROCEDURE — 2500000003 HC RX 250 WO HCPCS: Performed by: HOSPITALIST

## 2022-03-17 PROCEDURE — 93005 ELECTROCARDIOGRAM TRACING: CPT | Performed by: STUDENT IN AN ORGANIZED HEALTH CARE EDUCATION/TRAINING PROGRAM

## 2022-03-17 PROCEDURE — 94660 CPAP INITIATION&MGMT: CPT

## 2022-03-17 PROCEDURE — 83605 ASSAY OF LACTIC ACID: CPT

## 2022-03-17 RX ORDER — ACETAMINOPHEN 650 MG/1
650 SUPPOSITORY RECTAL EVERY 6 HOURS PRN
Status: DISCONTINUED | OUTPATIENT
Start: 2022-03-17 | End: 2022-03-22 | Stop reason: HOSPADM

## 2022-03-17 RX ORDER — SODIUM CHLORIDE 0.9 % (FLUSH) 0.9 %
10 SYRINGE (ML) INJECTION PRN
Status: DISCONTINUED | OUTPATIENT
Start: 2022-03-17 | End: 2022-03-22 | Stop reason: HOSPADM

## 2022-03-17 RX ORDER — DEXTROSE MONOHYDRATE 50 MG/ML
100 INJECTION, SOLUTION INTRAVENOUS PRN
Status: DISCONTINUED | OUTPATIENT
Start: 2022-03-17 | End: 2022-03-22 | Stop reason: HOSPADM

## 2022-03-17 RX ORDER — CINACALCET 30 MG/1
30 TABLET, FILM COATED ORAL
Status: DISCONTINUED | OUTPATIENT
Start: 2022-03-18 | End: 2022-03-19

## 2022-03-17 RX ORDER — NICOTINE POLACRILEX 4 MG
15 LOZENGE BUCCAL PRN
Status: DISCONTINUED | OUTPATIENT
Start: 2022-03-17 | End: 2022-03-22 | Stop reason: HOSPADM

## 2022-03-17 RX ORDER — QUETIAPINE FUMARATE 25 MG/1
50 TABLET, FILM COATED ORAL NIGHTLY
Status: DISCONTINUED | OUTPATIENT
Start: 2022-03-17 | End: 2022-03-22 | Stop reason: HOSPADM

## 2022-03-17 RX ORDER — PANTOPRAZOLE SODIUM 40 MG/1
40 TABLET, DELAYED RELEASE ORAL
Status: DISCONTINUED | OUTPATIENT
Start: 2022-03-18 | End: 2022-03-22 | Stop reason: HOSPADM

## 2022-03-17 RX ORDER — SODIUM CHLORIDE 9 MG/ML
25 INJECTION, SOLUTION INTRAVENOUS PRN
Status: DISCONTINUED | OUTPATIENT
Start: 2022-03-17 | End: 2022-03-22 | Stop reason: HOSPADM

## 2022-03-17 RX ORDER — CALCIUM ACETATE 667 MG/1
1 CAPSULE ORAL
Status: DISCONTINUED | OUTPATIENT
Start: 2022-03-17 | End: 2022-03-22 | Stop reason: HOSPADM

## 2022-03-17 RX ORDER — PRAVASTATIN SODIUM 40 MG
40 TABLET ORAL DAILY
Status: DISCONTINUED | OUTPATIENT
Start: 2022-03-17 | End: 2022-03-22 | Stop reason: HOSPADM

## 2022-03-17 RX ORDER — NITROGLYCERIN 0.4 MG/1
0.4 TABLET SUBLINGUAL EVERY 5 MIN PRN
Status: DISCONTINUED | OUTPATIENT
Start: 2022-03-17 | End: 2022-03-22 | Stop reason: HOSPADM

## 2022-03-17 RX ORDER — CLOPIDOGREL BISULFATE 75 MG/1
75 TABLET ORAL DAILY
Status: DISCONTINUED | OUTPATIENT
Start: 2022-03-17 | End: 2022-03-22 | Stop reason: HOSPADM

## 2022-03-17 RX ORDER — MAGNESIUM SULFATE IN WATER 40 MG/ML
2000 INJECTION, SOLUTION INTRAVENOUS PRN
Status: DISCONTINUED | OUTPATIENT
Start: 2022-03-17 | End: 2022-03-22 | Stop reason: HOSPADM

## 2022-03-17 RX ORDER — DULOXETIN HYDROCHLORIDE 60 MG/1
60 CAPSULE, DELAYED RELEASE ORAL DAILY
Status: DISCONTINUED | OUTPATIENT
Start: 2022-03-17 | End: 2022-03-22 | Stop reason: HOSPADM

## 2022-03-17 RX ORDER — METOPROLOL SUCCINATE 50 MG/1
50 TABLET, EXTENDED RELEASE ORAL 2 TIMES DAILY
Status: DISCONTINUED | OUTPATIENT
Start: 2022-03-17 | End: 2022-03-22 | Stop reason: HOSPADM

## 2022-03-17 RX ORDER — ALBUTEROL SULFATE 2.5 MG/3ML
2.5 SOLUTION RESPIRATORY (INHALATION) 4 TIMES DAILY
Status: DISCONTINUED | OUTPATIENT
Start: 2022-03-17 | End: 2022-03-17

## 2022-03-17 RX ORDER — LIDOCAINE 4 G/G
1 PATCH TOPICAL DAILY
Status: DISCONTINUED | OUTPATIENT
Start: 2022-03-17 | End: 2022-03-22 | Stop reason: HOSPADM

## 2022-03-17 RX ORDER — ONDANSETRON 2 MG/ML
4 INJECTION INTRAMUSCULAR; INTRAVENOUS EVERY 6 HOURS PRN
Status: DISCONTINUED | OUTPATIENT
Start: 2022-03-17 | End: 2022-03-22 | Stop reason: HOSPADM

## 2022-03-17 RX ORDER — ALBUTEROL SULFATE 2.5 MG/3ML
2.5 SOLUTION RESPIRATORY (INHALATION) EVERY 4 HOURS PRN
Status: DISCONTINUED | OUTPATIENT
Start: 2022-03-17 | End: 2022-03-22 | Stop reason: HOSPADM

## 2022-03-17 RX ORDER — CYCLOBENZAPRINE HCL 10 MG
5 TABLET ORAL 3 TIMES DAILY PRN
Status: DISCONTINUED | OUTPATIENT
Start: 2022-03-17 | End: 2022-03-22 | Stop reason: HOSPADM

## 2022-03-17 RX ORDER — GABAPENTIN 100 MG/1
100 CAPSULE ORAL 3 TIMES DAILY
Status: DISCONTINUED | OUTPATIENT
Start: 2022-03-17 | End: 2022-03-22 | Stop reason: HOSPADM

## 2022-03-17 RX ORDER — CHOLECALCIFEROL (VITAMIN D3) 10 MCG
1 TABLET ORAL DAILY
Status: DISCONTINUED | OUTPATIENT
Start: 2022-03-17 | End: 2022-03-22 | Stop reason: HOSPADM

## 2022-03-17 RX ORDER — SENNA PLUS 8.6 MG/1
1 TABLET ORAL DAILY PRN
Status: DISCONTINUED | OUTPATIENT
Start: 2022-03-17 | End: 2022-03-22 | Stop reason: HOSPADM

## 2022-03-17 RX ORDER — INSULIN GLARGINE 100 [IU]/ML
30 INJECTION, SOLUTION SUBCUTANEOUS NIGHTLY
Status: DISCONTINUED | OUTPATIENT
Start: 2022-03-17 | End: 2022-03-22 | Stop reason: HOSPADM

## 2022-03-17 RX ORDER — SODIUM CHLORIDE 0.9 % (FLUSH) 0.9 %
10 SYRINGE (ML) INJECTION EVERY 12 HOURS SCHEDULED
Status: DISCONTINUED | OUTPATIENT
Start: 2022-03-17 | End: 2022-03-22 | Stop reason: HOSPADM

## 2022-03-17 RX ORDER — PROMETHAZINE HYDROCHLORIDE 25 MG/1
12.5 TABLET ORAL EVERY 6 HOURS PRN
Status: DISCONTINUED | OUTPATIENT
Start: 2022-03-17 | End: 2022-03-22 | Stop reason: HOSPADM

## 2022-03-17 RX ORDER — ACETAMINOPHEN 325 MG/1
650 TABLET ORAL EVERY 6 HOURS PRN
Status: DISCONTINUED | OUTPATIENT
Start: 2022-03-17 | End: 2022-03-22 | Stop reason: HOSPADM

## 2022-03-17 RX ORDER — FLUTICASONE PROPIONATE 50 MCG
1 SPRAY, SUSPENSION (ML) NASAL DAILY
Status: DISCONTINUED | OUTPATIENT
Start: 2022-03-17 | End: 2022-03-22 | Stop reason: HOSPADM

## 2022-03-17 RX ORDER — ASPIRIN 81 MG/1
324 TABLET, CHEWABLE ORAL ONCE
Status: COMPLETED | OUTPATIENT
Start: 2022-03-17 | End: 2022-03-17

## 2022-03-17 RX ORDER — HYDROCODONE BITARTRATE AND ACETAMINOPHEN 7.5; 325 MG/1; MG/1
1 TABLET ORAL ONCE
Status: COMPLETED | OUTPATIENT
Start: 2022-03-17 | End: 2022-03-17

## 2022-03-17 RX ORDER — HYDRALAZINE HYDROCHLORIDE 20 MG/ML
10 INJECTION INTRAMUSCULAR; INTRAVENOUS EVERY 6 HOURS PRN
Status: DISCONTINUED | OUTPATIENT
Start: 2022-03-17 | End: 2022-03-22 | Stop reason: HOSPADM

## 2022-03-17 RX ORDER — OXYCODONE HYDROCHLORIDE AND ACETAMINOPHEN 5; 325 MG/1; MG/1
1 TABLET ORAL EVERY 6 HOURS PRN
Status: DISCONTINUED | OUTPATIENT
Start: 2022-03-17 | End: 2022-03-22 | Stop reason: HOSPADM

## 2022-03-17 RX ORDER — DOCUSATE SODIUM 100 MG/1
100 CAPSULE, LIQUID FILLED ORAL 2 TIMES DAILY
Status: DISCONTINUED | OUTPATIENT
Start: 2022-03-17 | End: 2022-03-22 | Stop reason: HOSPADM

## 2022-03-17 RX ADMIN — FUROSEMIDE 5 MG/HR: 10 INJECTION, SOLUTION INTRAMUSCULAR; INTRAVENOUS at 21:02

## 2022-03-17 RX ADMIN — GABAPENTIN 100 MG: 100 CAPSULE ORAL at 20:58

## 2022-03-17 RX ADMIN — PRAVASTATIN SODIUM 40 MG: 40 TABLET ORAL at 20:58

## 2022-03-17 RX ADMIN — QUETIAPINE FUMARATE 50 MG: 25 TABLET ORAL at 20:58

## 2022-03-17 RX ADMIN — APIXABAN 5 MG: 5 TABLET, FILM COATED ORAL at 20:58

## 2022-03-17 RX ADMIN — NEPHROCAP 1 MG: 1 CAP ORAL at 20:58

## 2022-03-17 RX ADMIN — HYDROCODONE BITARTRATE AND ACETAMINOPHEN 1 TABLET: 7.5; 325 TABLET ORAL at 14:37

## 2022-03-17 RX ADMIN — CLOPIDOGREL BISULFATE 75 MG: 75 TABLET ORAL at 20:58

## 2022-03-17 RX ADMIN — FLUTICASONE PROPIONATE 1 SPRAY: 50 SPRAY, METERED NASAL at 20:58

## 2022-03-17 RX ADMIN — INSULIN GLARGINE 30 UNITS: 100 INJECTION, SOLUTION SUBCUTANEOUS at 21:02

## 2022-03-17 RX ADMIN — NITROGLYCERIN 0.4 MG: 0.4 TABLET, ORALLY DISINTEGRATING SUBLINGUAL at 13:26

## 2022-03-17 RX ADMIN — SODIUM CHLORIDE, PRESERVATIVE FREE 10 ML: 5 INJECTION INTRAVENOUS at 21:00

## 2022-03-17 RX ADMIN — METOPROLOL SUCCINATE 50 MG: 50 TABLET, EXTENDED RELEASE ORAL at 20:58

## 2022-03-17 RX ADMIN — DOCUSATE SODIUM 100 MG: 100 CAPSULE, LIQUID FILLED ORAL at 20:58

## 2022-03-17 RX ADMIN — CALCIUM ACETATE 667 MG: 667 CAPSULE ORAL at 20:58

## 2022-03-17 RX ADMIN — DULOXETINE 60 MG: 60 CAPSULE, DELAYED RELEASE ORAL at 20:58

## 2022-03-17 RX ADMIN — ASPIRIN 81 MG 324 MG: 81 TABLET ORAL at 13:19

## 2022-03-17 ASSESSMENT — PAIN DESCRIPTION - PROGRESSION
CLINICAL_PROGRESSION: NOT CHANGED
CLINICAL_PROGRESSION: NOT CHANGED

## 2022-03-17 ASSESSMENT — PAIN SCALES - GENERAL
PAINLEVEL_OUTOF10: 6
PAINLEVEL_OUTOF10: 6
PAINLEVEL_OUTOF10: 0
PAINLEVEL_OUTOF10: 10
PAINLEVEL_OUTOF10: 0
PAINLEVEL_OUTOF10: 10

## 2022-03-17 ASSESSMENT — PAIN DESCRIPTION - LOCATION
LOCATION: CHEST
LOCATION: BACK

## 2022-03-17 ASSESSMENT — PAIN DESCRIPTION - DESCRIPTORS: DESCRIPTORS: DISCOMFORT

## 2022-03-17 ASSESSMENT — ENCOUNTER SYMPTOMS
GASTROINTESTINAL NEGATIVE: 1
SHORTNESS OF BREATH: 1

## 2022-03-17 ASSESSMENT — PAIN DESCRIPTION - ORIENTATION: ORIENTATION: LOWER

## 2022-03-17 ASSESSMENT — PAIN DESCRIPTION - PAIN TYPE: TYPE: CHRONIC PAIN

## 2022-03-17 NOTE — ED NOTES
This RN to pt's room. Pt states that there has been no change in his pain and is requesting for further intervention. Dr. Gena Al still in ER after assessing pt, this RN notifies her. Dr. Gena Al immediately to pt's room to assess pt and verbal orders a heat pack and lidocaine patch. Both applied per order.      Nichol David RN  03/17/22 1607

## 2022-03-17 NOTE — CARE COORDINATION
CASE MANAGEMENT INITIAL ASSESSMENT      Reviewed chart and completed assessment with patient:yes  Family present: no  Explained Case Management role/services. yes    Primary contact information:wife, ZINA MONROY Decision Maker :   Primary Decision Maker: Crystal Ann - Spouse - [phone]          Can this person be reached and be able to respond quickly, such as within a few minutes or hours? Yes      Admit date/status:3/17/22  Diagnosis:ESRD   Is this a Readmission?:  Yes      Insurance:Terrence ba   [facility]       3 night stay required: No    Living arrangements, Adls, care needs, prior to admission:home with wife, reports he is independent in ADLs    Durable Medical Equipment at home:  Walker_x_Cane__RTS_x_ BSC__Shower Chair__  02__ HHN__ CPAPx__  BiPap__  Hospital Bed__ W/C___ Other_____    Services in the home and/or outpatient, prior to admission:none, [facility] home care was referred on d/c 3/3/22    Current PCP:Dr. Deanne Stein    Transportation needs: car     Dialysis Facility (if applicable)   · Name: [facility]  · Address: [place] [address]  · Dialysis Schedule:M, W, Fri at 36  · Phone:  · Fax:    PT/OT recs:    Hospital Exemption Notification (HEN):    Barriers to discharge:medical complications    Plan/comments:Referred to patient for d/c planning. Spoke to patient. Patient is a 46year old male admitted for ESRD. Patient reports he lives at home with wife. Patient reports he is independent in ADLs. Reports he drives self to HD. Patient reports [facility] was supposed to see but has not. Referred to [facility] who states they called several times with no answer and unable to leave message. Encourage to give wife's number to [facility] on d/c. Patient is interested in [facility].. Per record, patient is supposed to meet with Duyen Soto in future also. Patient denies d/c needs at this time.   Electronically signed by NINO Ibarra LISW-S on 3/17/2022 at 4:12 PM      ECOC on chart for MD signature

## 2022-03-17 NOTE — PROGRESS NOTES
03/17/22 1841   RT Protocol   History Pulmonary Disease 1   Respiratory pattern 0   Breath sounds 2   Cough 0   Indications for Bronchodilator Therapy On home bronchodilators   Bronchodilator Assessment Score 3

## 2022-03-17 NOTE — PROGRESS NOTES
4 Eyes Skin Assessment     The patient is being assess for  Admission    I agree that 2 RN's have performed a thorough Head to Toe Skin Assessment on the patient. ALL assessment sites listed below have been assessed. Areas assessed by both nurses:  YES  [x]   Head, Face, and Ears   [x]   Shoulders, Back, and Chest  [x]   Arms, Elbows, and Hands   [x]   Coccyx, Sacrum, and Ischum  [x]   Legs, Feet, and Heels        Does the Patient have Skin Breakdown?   Yes a wound was noted on the Admission Assessment and an WOUND LDA was Initiated documentation include the Liberty-wound, Wound Assessment, Measurements, Dressing Treatment, Drainage, and Color\",         Isaac Prevention initiated:  Yes   Wound Care Orders initiated:  Yes      [address]  nurse consulted for Pressure Injury (Stage 3,4, Unstageable, DTI, NWPT, and Complex wounds):  No      Nurse 1 eSignature: Electronically signed by Lakeisha Champagne RN on 3/17/22 at 6:41 PM EDT    **SHARE this note so that the co-signing nurse is able to place an eSignature**    Nurse 2 eSignature: Electronically signed by Nomi Matthews RN on 3/17/22 at 10:05 PM EDT

## 2022-03-17 NOTE — ED NOTES
Fall risk precautions in place. Bed alarm, under pt and active, fall wrist band and socks in place.      Anai Kruger RN  03/17/22 3439

## 2022-03-17 NOTE — PROGRESS NOTES
Patient admitted to room 207 from ED 24. All belongings transported with the patient from the ED. Patient oriented to the flow of the day including co-rounding with MDs, how to order food, and potential testing and labs to be collected. Patient's call light and phone within reach. Bed wheels locked, 2/4 rails up. Bed alarm in place. Will continue to monitor.

## 2022-03-17 NOTE — ED PROVIDER NOTES
I independently examined and evaluated Robinson Law. In brief, male, presenting with concerns for shortness of breath and chest pressure. He has end-stage renal disease but normally on dialysis Monday Wednesday and Friday. States that he missed his last 2 dialysis sessions, states that his dialysis catheter was not working well during his Monday session and that he was supposed to get a replacement done yesterday but that he slept through it. Focused exam revealed resting comfortably, no acute distress. Heart regular rate and rhythm. Lungs diminished bilaterally. Abdomen soft, nontender. No calf swelling or tenderness.     Labs Reviewed   CBC WITH AUTO DIFFERENTIAL - Abnormal; Notable for the following components:       Result Value    WBC 11.2 (*)     RBC 3.69 (*)     Hemoglobin 10.7 (*)     Hematocrit 32.7 (*)     RDW 16.9 (*)     Neutrophils Absolute 8.6 (*)     All other components within normal limits   COMPREHENSIVE METABOLIC PANEL W/ REFLEX TO MG FOR LOW K - Abnormal; Notable for the following components:    Sodium 127 (*)     Potassium reflex Magnesium 5.6 (*)     Chloride 88 (*)     CO2 20 (*)     Anion Gap 19 (*)     Glucose 137 (*)     BUN 76 (*)     CREATININE 9.8 (*)     GFR Non- 6 (*)     GFR African American 7 (*)     Alkaline Phosphatase 153 (*)     ALT 7 (*)     AST 12 (*)     All other components within normal limits    Narrative:     Julia Stage tel. [phone],  Chemistry results called to and read back by Bereket Beltran RN, 03/17/2022  13:40, by Fletcher Castle - Abnormal; Notable for the following components:    Pro-BNP 55,392 (*)     All other components within normal limits    Narrative:     Julia Stage tel. [phone],  Chemistry results called to and read back by Bereket Beltran RN, 03/17/2022  13:40, by [facility]   TROPONIN - Abnormal; Notable for the following components:    Troponin 0.12 (*)     All other components within normal limits    Narrative:     Alyson Haas tel. [phone],  Chemistry results called to and read back by Kathy Morales RN, 03/17/2022  13:40, by Ruth Elmore, VENOUS - Abnormal; Notable for the following components:    pCO2, Blade 34.1 (*)     HCO3, Venous 21.5 (*)     Carboxyhemoglobin 4.7 (*)     All other components within normal limits   TROPONIN - Abnormal; Notable for the following components:    Troponin 0.11 (*)     All other components within normal limits    Narrative:     Collection has been rescheduled by THE [facility] at 03/17/2022 18:59 Reason:   Failed attempt at venipuncture  Collection has been rescheduled by Latoya Emanuel at 03/17/2022 19:20 Reason:   Patient wants us to come back after he is done eating   LACTATE, SEPSIS   URINALYSIS WITH MICROSCOPIC   LACTATE, SEPSIS   BASIC METABOLIC PANEL   MAGNESIUM   CBC WITH AUTO DIFFERENTIAL   TROPONIN   LIPID PANEL   TSH   T4, FREE   IRON AND TIBC   BLOOD GAS, VENOUS   HEMOGLOBIN A1C   RENAL FUNCTION PANEL   POCT GLUCOSE   POCT GLUCOSE   POCT GLUCOSE     XR CHEST PORTABLE   Final Result   Limited study. Mild hazy opacity peripherally over the left mid lung is   likely due to rotation and overlying soft tissues. No definite acute process. IR TUNNELED CVC PLACE WO SQ PORT/PUMP > 5 YEARS    (Results Pending)     ED course: Patient is a 78-year-old male, presenting with concerns for missed dialysis sessions, shortness of breath. Full HPI as detailed above. Upon arrival in the ED, vitals remarkable for hypertension. Patient is resting comfortably and is in no acute distress. JOSIANE Jory Lopez, please refer to his note for further details of the patient's work-up and treatment. Patient will be hospitalized for venous access and dialysis. Nephrology was consulted. Patient is comfortable and in agreement with plan of care. All diagnostic, treatment, and disposition decisions were made by myself in conjunction with the advanced practice provider.     For all further details of the patient's emergency department visit, please see the advanced practice provider's documentation. 1. ESRD (end stage renal disease) ([facility]    2. Hypertensive urgency    3. Dyspnea and respiratory abnormalities    4. Elevated troponin    5. Elevated brain natriuretic peptide (BNP) level      Comment: Please note this report has been produced using speech recognition software and may contain errors related to that system including errors in grammar, punctuation, and spelling, as well as words and phrases that may be inappropriate. If there are any questions or concerns please feel free to contact the dictating provider for clarification.        Hilary Melara MD  03/17/22 5463

## 2022-03-17 NOTE — ED NOTES
Pt resting in bed, lying L side. Verbalizes no change in pain. Lidocaine patch applied to Lumbar spine. Pt updated on plan of care and denies further needs at this time.      Sebastian Corral RN  03/17/22 0779

## 2022-03-17 NOTE — DISCHARGE INSTR - COC
Continuity of Care Form    Patient Name: Jacob Hawk   :  1970  MRN:  [id number]    Admit date:  3/17/2022  Discharge date:  ***    Code Status Order: Full Code   Advance Directives:      Admitting Physician:  Estelle Edwards MD  PCP: Yaw Kitchen MD    Discharging Nurse: [facility] Unit/Room#:   Discharging Unit Phone Number: ***    Emergency Contact:   Extended Emergency Contact Information  Primary Emergency Contact: Crystal Ann  Address: [address] [place] [address] [address] [facility], [address] [facility] Phone: [phone]  Mobile Phone: [phone]  Relation: Spouse    Past Surgical History:  Past Surgical History:   Procedure Laterality Date    AORTIC VALVE REPLACEMENT N/A 10/15/2019    TRANSCATHETER AORTIC VALVE REPLACEMENT FEMORAL APPROACH performed by Jaky Moya MD at [facility] Right 2019    PERITONEAL DIALYSIS CATHETER REMOVAL performed by Alonso Layne MD at [address] [place] [address]      WN    CORONARY ANGIOPLASTY WITH STENT PLACEMENT  05/26/15    CYST REMOVAL  2013    EXCISION CYSTS, NECK X2 AND ABDOMINAL benign    DIAGNOSTIC CARDIAC CATH LAB PROCEDURE      DIALYSIS FISTULA CREATION Left 10/30/2017    LEFT BRACHIAL CEPHALIC FISTULA    DIALYSIS FISTULA CREATION Left 3/27/2019    LIGATION  AV FISTULA performed by Van Menon MD at [id number] [place] [address], DIAGNOSTIC      OTHER SURGICAL HISTORY  2017    laparoscopic cholecystectomy with intraoperative cholangiogram    OTHER SURGICAL HISTORY  2018    PORT PLACEMENT  - vas cath    OTHER SURGICAL HISTORY Bilateral 2018    laprascopic peritoneal dialysis catheter placement    OTHER SURGICAL HISTORY Right 2018    peritoneal dialysis port placed on right side of abdomen    OTHER SURGICAL HISTORY  2019    PTA/Stenting R External Iliac artery    IN LAP INSERTION TUNNELED INTRAPERITONEAL CATHETER N/A 9/21/2018    LAPAROSCOPIC PERITONEAL DIALYSIS CATHETER REPLACEMENT performed by Kaycee Beavers MD at [address] [place] [address] N/A 2/24/2022    PERINEAL ABCESS DRAINAGE performed by Kaycee Beavers MD at [facility]  01/06/2016    UPPER GASTROINTESTINAL ENDOSCOPY  01/29/2017    possible candida, otherwise normal appearing    VASCULAR SURGERY  aprx 2 years ago    2 stents placed, each side of groin       Immunization History:   Immunization History   Administered Date(s) Administered    Hepatitis B Adult (Engerix-B) 11/18/2017, 06/13/2018, 07/13/2018    Influenza Virus Vaccine 12/27/2012, 10/07/2014, 11/20/2015, 10/16/2019    Influenza, Intradermal, Quadrivalent, Preservative Free 11/16/2016    Influenza, MDCK Quadv, IM, PF (Flucelvax 2 yrs and older) 10/14/2017, 09/30/2020, 10/05/2021    Influenza, [place] Light, 6 mo and older, IM, PF (Flulaval, Fluarix) 09/15/2018    Influenza, Quadv, IM, PF (6 mo and older Fluzone, Flulaval, Fluarix, and 3 yrs and older Afluria) 10/16/2019    PPD Test 11/09/2017, 11/16/2017, 01/03/2019, 01/06/2020, 01/15/2021    Pneumococcal Conjugate 13-valent (Vhexzoz60) 10/14/2017    Pneumococcal Polysaccharide ([id number]) 10/07/2014, 11/19/2019    Tdap (Boostrix, Adacel) 02/13/2020    Zoster Recombinant (Shingrix) 02/13/2020       Active Problems:  Patient Active Problem List   Diagnosis Code    Sepsis without acute organ dysfunction ([facility] A41.9    Asthma-COPD overlap syndrome ([facility] J44.9    Coronary artery disease involving native coronary artery of native heart without angina pectoris I25.10    PVD (peripheral vascular disease) ([facility]) I73.9    Bicuspid aortic valve Q23.1    Bilateral hilar adenopathy syndrome R59.0    Claudication in peripheral vascular disease ([facility]) I73.9    HTN (hypertension), benign I10    Diabetic neuropathy ([facility] E11.40    Type 2 diabetes, uncontrolled, with neuropathy [facility] E11.40, E11.65    Passive smoke exposure Z77.22    Depression with anxiety F41.8    Pneumonia of right upper lobe due to infectious organism J18.9    Obesity (BMI 30-39. 9) E66.9    ZAINAB on CPAP G47.33, Z99.89    Degeneration of lumbar or lumbosacral intervertebral disc M51.37    Lumbar radiculopathy M54.16    Lumbosacral spondylosis without myelopathy S10.753    Biliary colic T55.80    Symptomatic cholelithiasis K80.20    Gastroparesis due to DM ([facility]) E11.43, K31.84    Elevated troponin R77.8    Angina, class IV ([facility]) I20.9    Dyspnea R06.00    Dyslipidemia E78.5    Acute on chronic combined systolic and diastolic heart failure ([facility]) I50.43    Ischemic cardiomyopathy I25.5    Tobacco abuse Z72.0    CVA (cerebral vascular accident) ([facility] I63.9    History of CVA (cerebrovascular accident) Z86.73    Type 2 diabetes mellitus without complication, without long-term current use of insulin ([facility]) E11.9    ZAINAB (obstructive sleep apnea) G47.33    Pleural effusion J90    Chronic anemia D64.9    Nonrheumatic aortic valve stenosis I35.0    Mucus plugging of bronchi T17.500A    Hemodialysis-associated hypotension I95.3    ESRD (end stage renal disease) ([facility]) N18.6    Hypotension due to drugs T39.3    Acute diastolic CHF (congestive heart failure) ([facility]) I50.31    Neuromuscular disorder ([facility]) G70.9    Renovascular hypertension I15.0    Mixed hyperlipidemia E78.2    Cigarette nicotine dependence in remission F17.211    Pulmonary edema J81.1    Fluid overload E87.70    Anemia of chronic disease D63.8    SOB (shortness of breath) on exertion R06.02    Steal syndrome of dialysis vascular access [facility]) T82.898A    Chronic, continuous use of opioids F11.90    Chronic bronchitis ([facility]) J42    Nasal congestion R09.81    Hypercholesteremia E78.00    Bradycardia R00.1    S/P TAVR (transcatheter aortic valve replacement) Z95.2    Syncope and collapse R55    PAF (paroxysmal atrial fibrillation) ([facility]) I48.0    Bilateral leg weakness R29.898    GBS (Guillain-[place] syndrome) ([facility] G61.0    Sinus pause I45.5    Weakness of both lower extremities R29.898    Sinus bradycardia R00.1    Ataxia R27.0    Peripheral vascular occlusive disease ([facility]) I73.9    Cellulitis of right foot L03.115    Iliac artery occlusion, 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    Cellulitis and abscess of hand L03.119, L02.519    Type 2 diabetes mellitus with hyperglycemia ([facility]) E11.65    Acute encephalopathy G93.40    Acute hypoxemic respiratory failure ([facility]) J96.01    Acute CVA (cerebrovascular accident) ([facility] I63.9    Speech problem R47.9    Urinary tract infection with hematuria N39.0, R31.9    Respiratory failure with hypoxia ([facility] J96.91    Acute respiratory failure with hypercapnia ([facility]) J96.02    Acute pulmonary edema ([facility]) J81.0    Obesity, Class II, BMI 35-39.9 E66.9    Grade II diastolic dysfunction C40.43    Shock circulatory ([facility]) R57.9    Smoker F17.200    Normocytic normochromic anemia D64.9    NSTEMI (non-ST elevated myocardial infarction) ([facility] I21.4    SIRS (systemic inflammatory response syndrome) ([facility]) R65.10    Atrial flutter ([facility]) I48.92    Liberty-rectal abscess K61.1    Somnolence R40.0       Isolation/Infection:   Isolation            No Isolation          Patient Infection Status       Infection Onset Added Last Indicated Last Indicated By Review Planned Expiration Resolved Resolved By    None active    Resolved    COVID-19 (Rule Out) 02/25/22 02/25/22 02/25/22 COVID-19, Rapid (Ordered)   02/25/22 Rule-Out Test Resulted    COVID-19 (Rule Out) 01/12/22 01/12/22 01/12/22 COVID-19, Rapid (Ordered)   01/12/22 Rule-Out Test Resulted    COVID-19 (Rule Out) 11/29/21 11/29/21 11/29/21 COVID-19, Rapid (Ordered)   11/30/21 Rule-Out Test Resulted    COVID-19 (Rule Out) 08/29/21 08/29/21 08/29/21 COVID-19 (Ordered)   08/29/21 Rule-Out Test Resulted    COVID-19 (Rule Out) 12/18/20 12/18/20 12/18/20 COVID-19 (Ordered)   12/18/20 Rule-Out Test Resulted    COVID-19 (Rule Out) 05/04/20 05/04/20 05/04/20 COVID-19 (Ordered) 05/04/20 Rule-Out Test Resulted            Nurse Assessment:  Last Vital Signs: BP (!) 152/106   Pulse 85   Temp 98.2 °F (36.8 °C) (Oral)   Resp 21   SpO2 100%     Last documented pain score (0-10 scale): Pain Level: 10  Last Weight:   Wt Readings from Last 1 Encounters:   03/03/22 248 lb 7.3 oz (112.7 kg)     Mental Status:  {IP PT MENTAL STATUS:88363}    IV Access:  {[facility] [place] IV ACCESS:548429562}    Nursing Mobility/ADLs:  Walking   {CHP DME AXGH:605999152}  Transfer  {P DME LKOS:596862801}  Bathing  {CHP DME NZJR:872518487}  Dressing  {CHP DME UWEA:385729634}  Toileting  {CHP DME XTSJ:469385420}  Feeding  {[facility] DME BFIY:228553365}  Med Admin  {[facility] DME FOVD:760441252}  Med Delivery   {[facility] [place] MED Delivery:389788875}    Wound Care Documentation and Therapy:  Wound 08/30/21 Toe (Comment  which one) Right Great Toe (Active)   Number of days: 199       Wound 01/12/22 Pedal Anterior;Right Healing scab from previous blister (per pt), flaky edges (Active)   Number of days: 64       Wound 01/12/22 Toe (Comment  which one) Anterior;Right Scab from old blister (per pt) on 4th toe, flaky edges (Active)   Number of days: 64        Elimination:  Continence: Bowel: {YES / MX:59512}  Bladder: {YES / TU:44189}  Urinary Catheter: {Urinary Catheter:961049457}   Colostomy/Ileostomy/Ileal Conduit: {YES / PJ:81931}       Date of Last BM: ***  No intake or output data in the 24 hours ending 03/17/22 1608  No intake/output data recorded.     Safety Concerns:     [address] Safety Concerns:050001560}    Impairments/Disabilities:      [address] Impairments/Disabilities:007885672}    Nutrition Therapy:  Current Nutrition Therapy:   [address] Diet List:997403531}    Routes of Feeding: {[facility] DME Other Feedings:042514803}  Liquids: {Slp liquid thickness:92904}  Daily Fluid Restriction: {CHP DME Yes amt example:589387252}  Last Modified Barium Swallow with Video (Video Swallowing Test): {Done Not Done QGOT:750233512}    Treatments at the Time of Hospital Discharge:   Respiratory Treatments: ***  Oxygen Therapy:  {Therapy; copd oxygen:98871}  Ventilator:    {MH CC Vent VQYO:282630628}    Rehab Therapies: {THERAPEUTIC INTERVENTION:9059787298}  Weight Bearing Status/Restrictions: [address] CC Weight Bearin}  Other Medical Equipment (for information only, NOT a DME order):  {EQUIPMENT:476722287}  Other Treatments: ***    Patient's personal belongings (please select all that are sent with patient):  {CHP DME Belongings:301850375}    RN SIGNATURE:  {Esignature:975532280}    CASE MANAGEMENT/SOCIAL WORK SECTION    Inpatient Status Date: ***    Readmission Risk Assessment Score:  Readmission Risk              Risk of Unplanned Readmission:  0           Discharging to Facility/ Agency   Name: [place] [address]. [place] [address]         Phone: [phone]       Fax: [phone]        Dialysis Facility (if applicable)   Name: Nataliia Segovia  Address: [facility]  Dialysis Schedule:,   Phone:  Fax:    / signature: {Esignature:376482070}    PHYSICIAN SECTION    Prognosis: Good    Condition at Discharge: Stable    Rehab Potential (if transferring to Rehab): Good    Recommended Labs or Other Treatments After Discharge: Follow-up with PCP in 1-2 weeks   Maintain compliancy with dialysis schedule   Recheck BMP within 1 week    Physician Certification: I certify the above information and transfer of Madhav Felix  is necessary for the continuing treatment of the diagnosis listed and that he requires [address] for less 30 days.      Update Admission H&P: No change in H&P    PHYSICIAN SIGNATURE:  Electronically signed by JASE Márquez on 3/22/22 at 9:12 AM EDT

## 2022-03-17 NOTE — ED NOTES
[address] [facility] per [facility]  RE:ESRD; access issues  @[phone] Dr. Jesse Rodriguez and talked to [facility]

## 2022-03-17 NOTE — ED PROVIDER NOTES
MHAZ A2 CARD TELEMETRY      CHIEF COMPLAINT  Shortness of Breath (pt reports \"dialysis cather not working good on [employer]". Pt states he missed HD yesterday \"because he slept in\". MWF at 1130 at [facility]. Now c/o shortness of breath)      SHARED SERVICE VISIT  I have seen and evaluated this patient with my supervising physician, Dr. August Barnes. HISTORY OF PRESENT ILLNESS  Toñito Scruggs is a 46 y.o. male history ESRD (MWF), HF w/ EF 40-45%, CAD s/p recent PCI of the RCA with PATRICIA(1/12/22), DM, HTN, HLD; presents ED for evaluation of worsening shortness of breath and chest pressure. Patient reports on Monday his dialysis catheter was not working well and yesterday he missed dialysis due to sleeping in. Since then patient has reported worsening shortness of breath difficulty breathing. Denies any known weight gain or swelling of his lower extremities. Patient is unsure of his medications however states he is compliant. No abdominal pain. No other complaints, modifying factors or associated symptoms. Nursing notes reviewed.    Past Medical History:   Diagnosis Date    Ambulatory dysfunction     walker for long distances, SOB with distance    Aortic stenosis     echo 2017    Arthritis     hands and hips    Asthma     Bilateral hilar adenopathy syndrome 6/3/2013    CAD (coronary artery disease)     Dr. Viky Nickerson [facility]) 04/19/2019    EF= 43%    CHF (congestive heart failure) (HCC)     Chronic pain     COPD (chronic obstructive pulmonary disease) ([address]     pulmonology Dr. Kristine Harper    Depression     Diabetes mellitus ([address]     borderline    Difficult intravenous access     Emphysema of lung ([address]     ESRD (end stage renal disease) on dialysis ([address]     MWF    Fear of needles     Gastric ulcer     GERD (gastroesophageal reflux disease)     Heart valve problem     bicuspic valve    Hemodialysis patient ([address]     History of spinal fracture     work incident    Hx of blood clots     Bilateral lower extremities; stents in place    Hyperlipidemia     Hypertension     MI (myocardial infarction) ([facility] 2019    has had 9 MIs. 2019 was the last    Neuromuscular disorder ([address]     due to CVA    Numbness and tingling in left arm     from fistula    Pneumonia     PONV (postoperative nausea and vomiting)     Prolonged emergence from general anesthesia     States requires more medication than most people    Sleep apnea     Uses CPAP    Stroke ([facility]     7mm thalamic cva 2017 deficts left side, left side weakness    TIA (transient ischemic attack)     Unspecified diseases of blood and blood-forming organs      Past Surgical History:   Procedure Laterality Date    AORTIC VALVE REPLACEMENT N/A 10/15/2019    TRANSCATHETER AORTIC VALVE REPLACEMENT FEMORAL APPROACH performed by Juan Nichols MD at [address] [place] [address] Right 7/2/2019    PERITONEAL DIALYSIS CATHETER REMOVAL performed by Devonte Wooten MD at [facility] COLONOSCOPY  2/29/2015    WN    CORONARY ANGIOPLASTY WITH STENT PLACEMENT  05/26/15    CYST REMOVAL  08/14/2013    EXCISION CYSTS, NECK X2 AND ABDOMINAL benign    DIAGNOSTIC CARDIAC CATH LAB PROCEDURE      DIALYSIS FISTULA CREATION Left 10/30/2017    LEFT BRACHIAL CEPHALIC FISTULA    DIALYSIS FISTULA CREATION Left 3/27/2019    LIGATION  AV FISTULA performed by Frank Merlos MD at [facility], COLON, DIAGNOSTIC      OTHER SURGICAL HISTORY  02/01/2017    laparoscopic cholecystectomy with intraoperative cholangiogram    OTHER SURGICAL HISTORY  2018    PORT PLACEMENT  - vas cath    OTHER SURGICAL HISTORY Bilateral 06/26/2018    laprascopic peritoneal dialysis catheter placement    OTHER SURGICAL HISTORY Right 09/2018    peritoneal dialysis port placed on right side of abdomen    OTHER SURGICAL HISTORY  05/28/2019    PTA/Stenting R External Iliac artery    HI LAP INSERTION TUNNELED INTRAPERITONEAL CATHETER N/A 2018    LAPAROSCOPIC PERITONEAL DIALYSIS CATHETER REPLACEMENT performed by Felicitas Arceo MD at [address] N/A 2022    PERINEAL ABCESS DRAINAGE performed by Felicitas Arceo MD at [address] ENDOSCOPY  2016    UPPER GASTROINTESTINAL ENDOSCOPY  2017    possible candida, otherwise normal appearing    VASCULAR SURGERY  aprx 2 years ago    2 stents placed, each side of groin     Family History   Problem Relation Age of Onset    Diabetes Mother     Heart Disease Father     Kidney Disease Sister         stage 4-kidney failure    Cancer Sister     Heart Disease Sister     Obesity Sister     Cancer Sister     Heart Disease Sister     Obesity Sister     Alcohol Abuse Brother      Social History     Socioeconomic History    Marital status:      Spouse name: Not on file    Number of children: Not on file    Years of education: Not on file    Highest education level: Not on file   Occupational History    Not on file   Tobacco Use    Smoking status: Former Smoker     Packs/day: 0.50     Years: 33.00     Pack years: 16.50     Types: Cigarettes     Quit date: 2020     Years since quittin.8    Smokeless tobacco: Never Used    Tobacco comment: [facility] quit 2021   Vaping Use    Vaping Use: Never used   Substance and Sexual Activity    Alcohol use: Not Currently     Alcohol/week: 0.0 standard drinks     Comment: occ    Drug use: No    Sexual activity: Yes     Partners: Female     Comment:    Other Topics Concern    Not on file   Social History Narrative    Not on file     Social Determinants of Health     Financial Resource Strain:     Difficulty of Paying Living Expenses: Not on file   Food Insecurity:     Worried About [address] in the Last Year: Not on file    Louise of Food in the Last Year: Not on file   Transportation Needs:     Lack of Transportation (Medical): Not on file    Lack of Transportation (Non-Medical):  Not on file   Physical Activity:     Days of Exercise per Week: Not on file    Minutes of Exercise per Session: Not on file   Stress:     Feeling of Stress : Not on file   Social Connections:     Frequency of Communication with Friends and Family: Not on file    Frequency of Social Gatherings with Friends and Family: Not on file    Attends Synagogue Services: Not on file    Active Member of Clubs or Organizations: Not on file    Attends Club or Organization Meetings: Not on file    Marital Status: Not on file   Intimate Partner Violence:     Fear of Current or Ex-Partner: Not on file    Emotionally Abused: Not on file    Physically Abused: Not on file    Sexually Abused: Not on file   Housing Stability:     Unable to Pay for Housing in the Last Year: Not on file    Number of [place] in the Last Year: Not on file    Unstable Housing in the Last Year: Not on file     Current Facility-Administered Medications   Medication Dose Route Frequency Provider Last Rate Last Admin    nitroGLYCERIN (NITROSTAT) SL tablet 0.4 mg  0.4 mg SubLINGual Q5 Min PRN Samuel Box MD   0.4 mg at 03/17/22 1326    lidocaine 4 % external patch 1 patch  1 patch TransDERmal Daily Samuel Box MD   1 patch at 03/17/22 1647    apixaban (ELIQUIS) tablet 5 mg  5 mg Oral BID Samuel Box MD   5 mg at 03/17/22 2058    b complex-C-folic acid (NEPHROCAPS) capsule 1 mg  1 capsule Oral Daily Samuel Box MD   1 mg at 03/17/22 2058    calcium acetate (PHOSLO) capsule 667 mg  1 capsule Oral TID WC Samuel Box MD   667 mg at 03/17/22 2058    clopidogrel (PLAVIX) tablet 75 mg  75 mg Oral Daily Samuel Box MD   75 mg at 03/17/22 2058    cyclobenzaprine (FLEXERIL) tablet 5 mg  5 mg Oral TID PRN Samuel Box MD        docusate sodium (COLACE) capsule 100 mg  100 mg Oral BID Samuel Box MD   100 mg at 03/17/22 2058    DULoxetine (CYMBALTA) extended release capsule 60 mg  60 mg Oral Daily Alejandra Isbell MD   60 mg at 03/17/22 2058    fluticasone (FLONASE) 50 MCG/ACT nasal spray 1 spray  1 spray Each Nostril Daily Alejandra Isbell MD   1 spray at 03/17/22 2058    gabapentin (NEURONTIN) capsule 100 mg  100 mg Oral TID Alejandra Isbell MD   100 mg at 03/17/22 2058    insulin glargine (LANTUS) injection vial 30 Units  30 Units SubCUTAneous Nightly Alejandra Isbell MD   30 Units at 03/17/22 2102    [START ON 3/18/2022] linaclotide (LINZESS) capsule 145 mcg  patient supplied  145 mcg Oral QAM AC Alejandra Isbell MD        metoprolol succinate (TOPROL XL) extended release tablet 50 mg  50 mg Oral BID Alejandra Isbell MD   50 mg at 03/17/22 2058    [START ON 3/18/2022] pantoprazole (PROTONIX) tablet 40 mg  40 mg Oral QAM AC Alejandra Isbell MD        pravastatin (PRAVACHOL) tablet 40 mg  40 mg Oral Daily Alejandra Isbell MD   40 mg at 03/17/22 2058    QUEtiapine (SEROQUEL) tablet 50 mg  50 mg Oral Nightly Alejandra Isbell MD   50 mg at 03/17/22 2058    tiotropium-olodaterol (STIOLTO) 2.5-2.5 MCG/ACT inhaler 2 puff  2 puff Inhalation Daily Alejandra Isbell MD        oxyCODONE-acetaminophen (PERCOCET) 5-325 MG per tablet 1 tablet  1 tablet Oral Q6H PRN Alejandra Isbell MD        insulin lispro (HUMALOG) injection vial 0-12 Units  0-12 Units SubCUTAneous TID WC Alejandra Isbell MD        insulin lispro (HUMALOG) injection vial 0-6 Units  0-6 Units SubCUTAneous Nightly Alejandra Isbell MD        glucose (GLUTOSE) 40 % oral gel 15 g  15 g Oral PRN Alejandra Isbell MD        dextrose bolus (hypoglycemia) 10%   IntraVENous PRN Alejandra Isbell MD        glucagon (rDNA) injection 1 mg  1 mg IntraMUSCular PRN Alejandra Isbell MD        dextrose 5 % solution  100 mL/hr IntraVENous PRN Alejandra Isbell MD        sodium chloride flush 0.9 % injection 10 mL  10 mL IntraVENous 2 times per day Alejandra Isbell MD   10 mL at 03/17/22 2100    sodium chloride flush 0.9 % injection 10 mL  10 mL IntraVENous PRN Josselin Lozada MD        0.9 % sodium chloride infusion  25 mL IntraVENous PRN Josselin Lozada MD        promethazine (PHENERGAN) tablet 12.5 mg  12.5 mg Oral Q6H PRN Josselin Lozada MD        Or    ondansetron [facility]) injection 4 mg  4 mg IntraVENous Q6H PRN Josselin Lozada MD        senna (SENOKOT) tablet 8.6 mg  1 tablet Oral Daily PRN Josselin Lozada MD        acetaminophen (TYLENOL) tablet 650 mg  650 mg Oral Q6H PRN Josselin Lozada MD        Or   [facility] acetaminophen (TYLENOL) suppository 650 mg  650 mg Rectal Q6H PRN Josselin Lozada MD        magnesium sulfate 2000 mg in 50 mL IVPB premix  2,000 mg IntraVENous PRN Josselin Lozada MD        insulin lispro (HUMALOG) injection vial 0.08 Units/kg  0.08 Units/kg SubCUTAneous TID  Josselin Lozada MD        furosemide (LASIX) 100 mg in dextrose 5 % 100 mL infusion  5 mg/hr IntraVENous Continuous Josselin Lozada MD 5 mL/hr at 03/17/22 2102 5 mg/hr at 03/17/22 2102    albuterol (PROVENTIL) nebulizer solution 2.5 mg  2.5 mg Nebulization Q4H PRN Josselin Lozada MD        [START ON 3/18/2022] cinacalcet (SENSIPAR) tablet 30 mg  30 mg Oral Q MWF Veda Hamman, MD        hydrALAZINE (APRESOLINE) injection 10 mg  10 mg IntraVENous Q6H PRN Josselin Lozada MD         Allergies   Allergen Reactions    Morphine Nausea And Vomiting       REVIEW OF SYSTEMS  10 systems reviewed, pertinent positives per HPI otherwise noted to be negative    PHYSICAL EXAM  BP (!) 158/95   Pulse 86   Temp 97.9 °F (36.6 °C) (Oral)   Resp 19   SpO2 100%   GENERAL APPEARANCE: Awake and alert. Cooperative. Tachypneic  HEAD: Normocephalic. Atraumatic. EYES: EOM's grossly intact. ENT: Mucous membranes are moist.   NECK: Supple. HEART: RRR. No murmurs. LUNGS: Tachypneic, respirations labored, mild expiratory wheeze appreciated. ABDOMEN: Soft. Obese; non-tender. No guarding or rebound. No masses. No organomegaly. EXTREMITIES: No peripheral edema. Moves all extremities equally. All extremities neurovascularly intact. SKIN: Warm and dry. No acute rashes. NEUROLOGICAL: Alert and oriented. CN's 2-12 intact. No gross facial drooping. Strength 5/5, sensation intact. PSYCHIATRIC: Normal mood and affect. RADIOLOGY  XR CHEST PORTABLE   Final Result   Limited study. Mild hazy opacity peripherally over the left mid lung is   likely due to rotation and overlying soft tissues. No definite acute process.          IR TUNNELED CVC PLACE WO SQ PORT/PUMP > 5 YEARS    (Results Pending)       LABS  Labs Reviewed   CBC WITH AUTO DIFFERENTIAL - Abnormal; Notable for the following components:       Result Value    WBC 11.2 (*)     RBC 3.69 (*)     Hemoglobin 10.7 (*)     Hematocrit 32.7 (*)     RDW 16.9 (*)     Neutrophils Absolute 8.6 (*)     All other components within normal limits   COMPREHENSIVE METABOLIC PANEL W/ REFLEX TO MG FOR LOW K - Abnormal; Notable for the following components:    Sodium 127 (*)     Potassium reflex Magnesium 5.6 (*)     Chloride 88 (*)     CO2 20 (*)     Anion Gap 19 (*)     Glucose 137 (*)     BUN 76 (*)     CREATININE 9.8 (*)     GFR Non- 6 (*)     GFR African American 7 (*)     Alkaline Phosphatase 153 (*)     ALT 7 (*)     AST 12 (*)     All other components within normal limits    Narrative:     Gissel Gardner tel. [phone],  Chemistry results called to and read back by Maria C Hernández RN, 03/17/2022  13:40, by [place] - Abnormal; Notable for the following components:    Pro-BNP 55,392 (*)     All other components within normal limits    Narrative:     Gissel Gardner tel. [phone],  Chemistry results called to and read back by Maria C Hernández RN, 03/17/2022  13:40, by [facility]   TROPONIN - Abnormal; Notable for the following components:    Troponin 0.12 (*)     All other components within normal limits    Narrative:     CALL [place]  [place] [phone],  Chemistry results called to and read back by Kathy Morales RN, 03/17/2022  13:40, by [address], VENOUS - Abnormal; Notable for the following components:    pCO2, [place] 34.1 (*)     HCO3, Venous 21.5 (*)     Carboxyhemoglobin 4.7 (*)     All other components within normal limits   TROPONIN - Abnormal; Notable for the following components:    Troponin 0.11 (*)     All other components within normal limits    Narrative:     Collection has been rescheduled by [facility] at 03/17/2022 18:59 Reason:   Failed attempt at venipuncture  Collection has been rescheduled by Latoya Emanuel at 03/17/2022 19:20 Reason:   Patient wants us to come back after he is done eating   LACTATE, SEPSIS   URINALYSIS WITH MICROSCOPIC   LACTATE, SEPSIS   BASIC METABOLIC PANEL   MAGNESIUM   CBC WITH AUTO DIFFERENTIAL   TROPONIN   LIPID PANEL   TSH   T4, FREE   IRON AND TIBC   BLOOD GAS, VENOUS   HEMOGLOBIN A1C   RENAL FUNCTION PANEL   POCT GLUCOSE   POCT GLUCOSE   POCT GLUCOSE       PROCEDURES  Unless otherwise noted below, none  Procedures         CRITICAL CARE TIME  The total critical care time spent while evaluating and treating this patient was 36 minutes. This excludes time spent doing separately billable procedures. This includes time at the bedside, data interpretation, medication management, obtaining critical history from collateral sources if the patient is unable to provide it directly, and physician consultation. Specifics of interventions taken and potentially life-threatening diagnostic considerations are listed above in the medical decision making. MDM  MDM  Patient is a 61-year-old male with a history of ESRD (MWF), HF w/ EF 40-45%, CAD s/p recent PCI of the RCA with PATRICIA(1/12/22), DM, HTN, HLD; presents ED for evaluation of shortness of breath. He missed dialysis on on Wednesday and reports difficulties with his port therefore incomplete dialysis on Monday.   On arrival to ED patient is slight tachycardic around 100 bpm, hypertensive at 214/105. Oxygen saturation 100% on room air. On exam patient is tachypneic with no peripheral edema appreciated. We will plan to obtain lab work including CBC CMP as well as a BMP and troponin. Will obtain chest x-ray to assess for pulmonary congestion. Will start patient on nitroglycerin sublingual.     Patient had improvement of his blood pressure as well as improvement of his chest discomfort with the nitroglycerin. Patient's lab work demonstrated BUN/creatinine of 76/9.8. Sodium 127, potassium hemolyzed at 5.6, CO2 20. Troponin was elevated at 0.12 however this appeared be trending downward from his prior labs. His BNP is 55,000 which appears just above his baseline. I spoke with the attending physician as well as the on-call nephrologist to discuss the plan for this patient disposition. Nephrology is reaching out to the dialysis nurse and I will plan to discuss with the hospitalist service request for admission for dialysis catheter placement with urgent dialysis; as well as serial troponins and cardiac evaluation. DISPOSITION  Requested admission    CLINICAL IMPRESSION  1. ESRD (end stage renal disease) ([facility]    2. Hypertensive urgency    3. Dyspnea and respiratory abnormalities    4. Elevated troponin    5.  Elevated brain natriuretic peptide (BNP) level            Zach Alex PA-C  03/17/22 4573

## 2022-03-17 NOTE — H&P
HOSPITALISTS HISTORY AND PHYSICAL    3/17/2022 4:45 PM    Patient Information:  Chantell Kat is a 46 y.o. male [id number]  PCP:  Jaden Gross MD (Tel: [phone] )    Chief complaint:    Chief Complaint   Patient presents with    Shortness of Breath     pt reports \"dialysis cather not working good on Monday\". Pt states he missed HD yesterday \"because he slept in\". MWF at 1130 at [facility]. Now c/o shortness of breath        History of Present Illness:  Yariel Loera is a 46 y.o. male who presented to the ED where for acute worsening dyspnea s/p skipping HD session yesterday. Review of epic chart reveals the patient is hospitalized almost twice every month related to his inability to appropriately care for himself. The patient has multiple comorbidities including: ESRD on HD, COPD/ZAINAB, IDDM, CVA with residual deficit, and CHF with renovascular HTN. Patient is  again altered upon arrival, with concerns for polypharmacy (which has been expressed by several providers at different encounters). Upon arrival to the ED patient was markedly hypertensive 214/105. Stat EKG revealed sinus tachycardia without evidence of acute ischemia. Notable labs include: Hyponatremia 127, hyperkalemia 5.6, BUN/CR 76/9.8 glucose 137, BNP 55,392, and anemia H/H 10.7/22.7. ED provider contacted [facility] to discuss need for urgent hemodialysis and correction of current HD access site. History obtained from patient and review of Epic chart    Old medical records show patient's most recent echo was performed on 1/12/2022 with the following results:  Summary   Limited only f/u for LVEF and RVF. The left ventricular systolic function is mildly reduced with an ejection   fraction of 40-45 %. There is hypokinesis of the apex and inferior walls. There is a very small circumferential pericardial effusion noted.    No tamponade physiology. The right ventricle is normal in size and function. REVIEW OF SYSTEMS:   Constitutional: Negative for fever,chills; positive generalized weakness  ENT: Negative for headache, rhinorrhea, and sore throat.   Respiratory: Increased dyspnea with wheezing with intermittent cough   cardiovascular: Negative for chest pain, palpitations, peripheral edema, orthopnea or PND  Gastrointestinal: Negative for N/V/D and abdominal pain; no hematemesis, hematochezia, or melena; no anorexia  Genitourinary: Negative for dysuria, frequency, retention; no incontinence  Hematologic/Lymphatic: Negative for bleeding tendency/excessive bruising  Musculoskeletal: Positive for chronic lumbar back pain; unable to ambulate without difficulty  Neurologic: S/p CVA with residual deficit: Negative for LOC, seizure activity, paresthesias,Skin: Negative for itching,rash, decubitus  Psychiatric: Positive memory loss and depression; no hallucinations; denies SI/HI  Endocrine: History of longstanding uncontrolled IDDM    Past Medical History:   has a past medical history of Ambulatory dysfunction, Aortic stenosis, Arthritis, Asthma, Bilateral hilar adenopathy syndrome, CAD (coronary artery disease), Cardiomyopathy ([address] CHF (congestive heart failure) ([address] Chronic pain, COPD (chronic obstructive pulmonary disease) ([address] Depression, Diabetes mellitus ([address] Difficult intravenous access, Emphysema of lung ([address] ESRD (end stage renal disease) on dialysis ([address] Fear of needles, Gastric ulcer, GERD (gastroesophageal reflux disease), Heart valve problem, Hemodialysis patient ([address] History of spinal fracture, Hx of blood clots, Hyperlipidemia, Hypertension, MI (myocardial infarction) ([address] Neuromuscular disorder ([address] Numbness and tingling in left arm, Pneumonia, PONV (postoperative nausea and vomiting), Prolonged emergence from general anesthesia, Sleep apnea, Stroke ([address] TIA (transient ischemic attack), and Unspecified diseases of blood and blood-forming organs. Past Surgical History:   has a past surgical history that includes Tonsillectomy; cyst removal (08/14/2013); Colonoscopy; Coronary angioplasty with stent (05/26/15); vascular surgery (aprx 2 years ago); Colonoscopy (2/29/2015); Upper gastrointestinal endoscopy (01/06/2016); Upper gastrointestinal endoscopy (01/29/2017); other surgical history (02/01/2017); Dialysis fistula creation (Left, 10/30/2017); Diagnostic Cardiac Cath Lab Procedure; other surgical history (2018); other surgical history (Bilateral, 06/26/2018); pr lap insertion tunneled intraperitoneal catheter (N/A, 9/21/2018); other surgical history (Right, 09/2018); Dialysis fistula creation (Left, 3/27/2019); other surgical history (05/28/2019); Endoscopy, colon, diagnostic; Catheter Removal (Right, 7/2/2019); Aortic valve replacement (N/A, 10/15/2019); and Rectal surgery (N/A, 2/24/2022). Medications:  No current facility-administered medications on file prior to encounter. Current Outpatient Medications on File Prior to Encounter   Medication Sig Dispense Refill    mineral oil liquid Take 30 mLs by mouth daily as needed for Constipation 300 mL 0    sennosides-docusate sodium (SENOKOT-S) 8.6-50 MG tablet Take 1 tablet by mouth daily 10 tablet 0    oxyCODONE-acetaminophen (PERCOCET) 7.5-325 MG per tablet Take 1 tablet by mouth every 6 hours as needed (pain) for up to 30 days.  120 tablet 0    cyclobenzaprine (FLEXERIL) 10 MG tablet TAKE 1 TABLET BY MOUTH EVERY 8 HOURS AS NEEDED 90 tablet 2    doxycycline hyclate (VIBRA-TABS) 100 MG tablet Take 1 tablet by mouth 2 times daily for 10 days 20 tablet 0    metoprolol succinate (TOPROL XL) 50 MG extended release tablet Take 1 tablet by mouth in the morning and at bedtime 60 tablet 1    apixaban (ELIQUIS) 5 MG TABS tablet Take 1 tablet by mouth 2 times daily 60 tablet 1    pantoprazole (PROTONIX) 40 MG tablet TAKE 1 TABLET BY MOUTH EVERY MORNING BEFORE BREAKFAST 30 tablet 1    pravastatin (PRAVACHOL) 40 MG tablet Take 1 tablet by mouth daily 90 tablet 3    clopidogrel (PLAVIX) 75 MG tablet Take 1 tablet by mouth daily 90 tablet 3    QUEtiapine (SEROQUEL) 50 MG tablet TAKE 1 TABLET BY MOUTH IN THE EVENING 30 tablet 2    fluticasone (FLONASE) 50 MCG/ACT nasal spray SHAKE LIQUID AND USE 2 SPRAYS IN EACH NOSTRIL DAILY 48 g 0    insulin glargine (BASAGLAR KWIKPEN) 100 UNIT/ML injection pen Inject 30 Units into the skin nightly 15 mL 1    gabapentin (NEURONTIN) 100 MG capsule TAKE 1-2 CAPSULES BY MOUTH THREE TIMES A  capsule 5     MG capsule TAKE 1 CAPSULE BY MOUTH TWICE DAILY 60 capsule 5    Continuous Blood Gluc Sensor (DEXCOM G6 SENSOR) MISC Every 10 days 9 each 3    Continuous Blood Gluc Transmit (DEXCOM G6 TRANSMITTER) MISC 1 each by Does not apply route every 3 months 1 each 3    Continuous Blood Gluc  (DEXCOM G6 ) ADAM 1 each by Does not apply route Daily with lunch 1 each 0    DULoxetine (CYMBALTA) 60 MG extended release capsule TAKE 1 CAPSULE BY MOUTH EVERY DAY 30 capsule 5    traZODone (DESYREL) 150 MG tablet TAKE ONE (1) TABLET BY MOUTH NIGHTLY 30 tablet 10    B Complex-C-Folic Acid (VIRT-CAPS) 1 MG CAPS TAKE 1 CAPSULE BY MOUTH EVERY DAY 90 capsule 1    Calcium Acetate, Phos Binder, 667 MG CAPS TAKE 1 CAPSULE BY MOUTH THREE TIMES DAILY WITH MEALS 90 capsule 3    LINZESS 145 MCG capsule TAKE 1 CAPSULE BY MOUTH EVERY MORNING BEFORE BREAKFAST 30 capsule 10    nitroGLYCERIN (NITROSTAT) 0.4 MG SL tablet DISSOLVE 1 TABLET UNDER THE TONGUE AS NEEDED FOR CHEST PAIN EVERY 5 MINUTES UP TO 3 TIMES.  IF NO RELIEF CALL 911. 25 tablet 10    insulin aspart (NOVOLOG FLEXPEN) 100 UNIT/ML injection pen Inject 20 Units into the skin 3 times daily (before meals) 15 pen 5    vitamin D (ERGOCALCIFEROL) 25082 units CAPS capsule TK 1 C PO WEEKLY  11    Tiotropium Bromide-Olodaterol (STIOLTO RESPIMAT) 2.5-2.5 MCG/ACT AERS Inhale 2 puffs into the lungs daily 2 Inhaler 0    Polyethylene Glycol 3350 GRAN       Blood Glucose Monitoring Suppl ADAM USE AS DIRECTED. 1 Device 0    Alcohol Swabs PADS USE AS DIRECTED 300 each 3    albuterol sulfate  (90 Base) MCG/ACT inhaler Inhale 2 puffs into the lungs every 6 hours as needed for Wheezing 1 Inhaler 3    ipratropium-albuterol (DUONEB) 0.5-2.5 (3) MG/3ML SOLN nebulizer solution Inhale 3 mLs into the lungs every 6 hours as needed for Shortness of Breath 360 mL 1    calcium carbonate (TUMS) 500 MG chewable tablet Take 1 tablet by mouth 3 times daily as needed for Heartburn. Allergies: Allergies   Allergen Reactions    Morphine Nausea And Vomiting        Social History:   reports that he quit smoking about 22 months ago. His smoking use included cigarettes. He has a 16.50 pack-year smoking history. He has never used smokeless tobacco. He reports previous alcohol use. He reports that he does not use drugs. Family History:  family history includes Alcohol Abuse in his brother; Cancer in his sister and sister; Diabetes in his mother; Heart Disease in his father, sister, and sister; Kidney Disease in his sister; Obesity in his sister and sister.      Physical Exam:  BP (!) 152/106   Pulse 85   Temp 98.2 °F (36.8 °C) (Oral)   Resp 21   SpO2 100%     General appearance: Middle-age male who appears chronically ill and disheveled; lethargic  Eyes: Sclera clear without conjunctival injection; PERRLA; EOMI  ENT: Mucous membranes moist without thrush; normal dentition  Neck: Supple without meningismus; no goiter; no carotid bruit bilaterally  Cardiovascular: Regular rhythm without ectopy; normal S1-S2 with no murmurs; no peripheral edema; no JVD  Respiratory: Mild tachypnea; CTAB with markedly diminished air exchange, no wheeze, rhonchi or rales  Gastrointestinal: Abdomen obese, non-tender, not distended; bowel sounds normal; no masses/organomegaly appreciated  Musculoskeletal: FROM spine and extremities x4; no gross deformity  Neurology: A&O x3; cranial nerves 2-12 grossly intact; motor 5/5  BUE/BLE; no seizure activity  Psychiatry: Disheveled with poor eye contact; flat affect; no visual/auditory hallucination  Skin: Warm, dry, normal turgor, no rash  PV: 2/4 radial and dorsalis pedis bilaterally; brisk capillary refill    Labs:  CBC:   Lab Results   Component Value Date    WBC 11.2 03/17/2022    RBC 3.69 03/17/2022    HGB 10.7 03/17/2022    HCT 32.7 03/17/2022    MCV 88.5 03/17/2022    MCH 29.0 03/17/2022    MCHC 32.7 03/17/2022    RDW 16.9 03/17/2022     03/17/2022    MPV 7.1 03/17/2022     BMP:    Lab Results   Component Value Date     03/17/2022    K 5.6 03/17/2022    CL 88 03/17/2022    CO2 20 03/17/2022    BUN 76 03/17/2022    CREATININE 9.8 03/17/2022    CALCIUM 9.0 03/17/2022    GFRAA 7 03/17/2022    GFRAA >60 05/17/2013    LABGLOM 6 03/17/2022    GLUCOSE 137 03/17/2022     XR CHEST PORTABLE   Final Result   Limited study. Mild hazy opacity peripherally over the left mid lung is   likely due to rotation and overlying soft tissues. No definite acute process. EKG: Pending    I visualized CXR images and EKG strips personally and agree with documented interpretation    Discussed case  with ED provider    Problem List:  Principal Problem:    Pulmonary edema with diastolic CHF, NYHA class 3 ([facility])  Active Problems:    Type 2 diabetes, uncontrolled, with neuropathy ([facility])    Acute on chronic combined systolic and diastolic heart failure ([facility])    ESRD (end stage renal disease) ([facility])    Obesity (BMI 30-39.9)    ZAINAB on CPAP    Tobacco abuse    Neuromuscular disorder ([facility]    Renovascular hypertension    Pulmonary edema  Resolved Problems:    * No resolved hospital problems.  *        Consults:  IP CONSULT TO NEPHROLOGY  IP CONSULT TO HOSPITALIST      Assessment/Plan:     Pulmonary edema s/p HD noncompliance  -Admit to floor for continuous telemetry monitoring and strict I's & O's  -IV Lasix drip initiated at 5 mg/H x15 hours overnight  -NEPHRO Dr. Gilbert Segovia aware of dialysis catheter malfunction and need for urgent dialysis session  -Results of recent ECHO reviewed and documented above  -2G Na and fluid restriction dietary modifications in place    Renovascular HTN with combined CHF  -Patient admitted to telemetry floor for continuous monitoring during stay  -EKG obtained in ED reviewed personally and found to be without evidence of LAD or acute ischemia  -Continue home medication dosage of Toprol-XL, Pravachol, Plavix, and Eliquis  -IV hydralazine scheduled PRN (with parameters) to address extreme BP elevation  -Results of recent ECHO reviewed and listed above    RAD/COPD/ZAINAB  -STAT VBG ordered to rule out hypercarbia in the setting of acute encephalopathy  -Continuous pulse oximetry monitoring initiated with PRN supplemental O2   -Continue home maintenance MDIs/nebulizer treatment including Stiolto Respimat  -Encourage aggressive pulmonary toilet including incentive spirometry every 4H while awake  -Albuterol nebs scheduled 4 times daily during stay  -Patient counseled on necessity of smoking cessation; nicotine replacement patch provided  -RT consulted for home CPAP setting    Uncontrolled IDDM  -A1c ordered with results pending at time of dictation  -Continue home dosage of Lantus 30 units nightly  -Humalog scheduled AC/HS in addition to PRN SSI coverage  -Carbohydrate restriction placed on diet    Encephalopathy with polypharmacy concerns  -Fall risk protocol in place with bed alarm activated  -TSH/ free T4, vitamin D, B12/folate, prealbumin and A1c lab work ordered with results currently pending  -Repeat head CT to ensure no acute ischemic changes; serial neuro checks every 4 hours overnight  -Dosages of Percocet, Neurontin, Flexeril, and Seroquel to be limited in use sparingly  -Concern patient continues to require readmission due to inability to care for himself; suspect he would benefit from [facility] placement, but patient and wife unwilling    DVT prophylaxis-continue twice daily Eliquis   Code status-full code  Diet-cardiac 2 g sodium with renal/carb/fluid restriction  IV access-PIV established in ED      Admit as inpatient. I anticipate hospitalization spanning more than two midnights for investigation and treatment of the above medically necessary diagnoses. Comment: Please note this report has been produced using speech recognition software and may contain errors related to that system including errors in grammar, punctuation, and spelling, as well as words and phrases that may be inappropriate. If there are any questions or concerns please feel free to contact the dictating provider for clarification.          Darek Abdullahi MD    3/17/2022 4:45 PM

## 2022-03-17 NOTE — ACP (ADVANCE CARE PLANNING)
Advance Care Planning     General Advance Care Planning (ACP) Conversation    Date of Conversation: 3/17/2022  Conducted with: Patient with Decision Making Capacity    Healthcare Decision Maker:    Primary Decision Maker: Crystal Ann - Spouse - [phone]  Click here to complete [address] including selection of the Healthcare Decision Maker Relationship (ie \"Primary\"). Today we documented Decision Maker(s) consistent with Legal Next of Kin hierarchy.       Length of Voluntary ACP Conversation in minutes:  <16 minutes (Non-Billable)    NINO Warren

## 2022-03-17 NOTE — ED NOTES
Pt continues resting in bed, lying L side. No change in condition. Pt verbalizes no change in pain. Pt denies needs at this time. Call light remains in reach.      Gely Herr RN  03/17/22 1965

## 2022-03-17 NOTE — ED NOTES
Hospital provider in room assessing pt at this time. No change in pt condition at this time.      Abhilash Malloy RN  03/17/22 1427

## 2022-03-18 LAB
ANION GAP SERPL CALCULATED.3IONS-SCNC: 16 MMOL/L (ref 3–16)
APTT: 36.1 SEC (ref 26.2–38.6)
APTT: 44.2 SEC (ref 26.2–38.6)
BACTERIA: ABNORMAL /HPF
BASOPHILS ABSOLUTE: 0.1 K/UL (ref 0–0.2)
BASOPHILS RELATIVE PERCENT: 1.1 %
BILIRUBIN URINE: NEGATIVE
BLOOD, URINE: ABNORMAL
BUN BLDV-MCNC: 83 MG/DL (ref 7–20)
CALCIUM SERPL-MCNC: 8.2 MG/DL (ref 8.3–10.6)
CHLORIDE BLD-SCNC: 95 MMOL/L (ref 99–110)
CHOLESTEROL, TOTAL: 167 MG/DL (ref 0–199)
CLARITY: ABNORMAL
CO2: 21 MMOL/L (ref 21–32)
COLOR: YELLOW
CREAT SERPL-MCNC: 10.6 MG/DL (ref 0.9–1.3)
EKG ATRIAL RATE: 95 BPM
EKG DIAGNOSIS: NORMAL
EKG P AXIS: 56 DEGREES
EKG P-R INTERVAL: 180 MS
EKG Q-T INTERVAL: 354 MS
EKG QRS DURATION: 92 MS
EKG QTC CALCULATION (BAZETT): 444 MS
EKG R AXIS: 35 DEGREES
EKG T AXIS: 88 DEGREES
EKG VENTRICULAR RATE: 95 BPM
EOSINOPHILS ABSOLUTE: 0.5 K/UL (ref 0–0.6)
EOSINOPHILS RELATIVE PERCENT: 6.8 %
EPITHELIAL CELLS, UA: ABNORMAL /HPF (ref 0–5)
ESTIMATED AVERAGE GLUCOSE: 182.9 MG/DL
GFR AFRICAN AMERICAN: 6
GFR NON-AFRICAN AMERICAN: 5
GLUCOSE BLD-MCNC: 114 MG/DL (ref 70–99)
GLUCOSE BLD-MCNC: 127 MG/DL (ref 70–99)
GLUCOSE BLD-MCNC: 183 MG/DL (ref 70–99)
GLUCOSE BLD-MCNC: 90 MG/DL (ref 70–99)
GLUCOSE URINE: 100 MG/DL
HBA1C MFR BLD: 8 %
HCT VFR BLD CALC: 31.2 % (ref 40.5–52.5)
HDLC SERPL-MCNC: 38 MG/DL (ref 40–60)
HEMOGLOBIN: 10.1 G/DL (ref 13.5–17.5)
INR BLD: 1.29 (ref 0.88–1.12)
IRON SATURATION: 19 % (ref 20–50)
IRON: 42 UG/DL (ref 59–158)
KETONES, URINE: NEGATIVE MG/DL
LDL CHOLESTEROL CALCULATED: 86 MG/DL
LEUKOCYTE ESTERASE, URINE: ABNORMAL
LYMPHOCYTES ABSOLUTE: 1 K/UL (ref 1–5.1)
LYMPHOCYTES RELATIVE PERCENT: 13.6 %
MAGNESIUM: 2 MG/DL (ref 1.8–2.4)
MCH RBC QN AUTO: 29.4 PG (ref 26–34)
MCHC RBC AUTO-ENTMCNC: 32.4 G/DL (ref 31–36)
MCV RBC AUTO: 90.7 FL (ref 80–100)
MICROSCOPIC EXAMINATION: YES
MONOCYTES ABSOLUTE: 0.6 K/UL (ref 0–1.3)
MONOCYTES RELATIVE PERCENT: 7.8 %
MUCUS: ABNORMAL /LPF
NEUTROPHILS ABSOLUTE: 5.4 K/UL (ref 1.7–7.7)
NEUTROPHILS RELATIVE PERCENT: 70.7 %
NITRITE, URINE: NEGATIVE
PDW BLD-RTO: 17.3 % (ref 12.4–15.4)
PERFORMED ON: ABNORMAL
PERFORMED ON: ABNORMAL
PERFORMED ON: NORMAL
PH UA: 8 (ref 5–8)
PLATELET # BLD: 292 K/UL (ref 135–450)
PMV BLD AUTO: 6.9 FL (ref 5–10.5)
POTASSIUM SERPL-SCNC: 5.5 MMOL/L (ref 3.5–5.1)
PROTEIN UA: >=300 MG/DL
PROTHROMBIN TIME: 14.7 SEC (ref 9.9–12.7)
RBC # BLD: 3.44 M/UL (ref 4.2–5.9)
RBC UA: ABNORMAL /HPF (ref 0–4)
SODIUM BLD-SCNC: 132 MMOL/L (ref 136–145)
SPECIFIC GRAVITY UA: 1.02 (ref 1–1.03)
T4 FREE: 1.2 NG/DL (ref 0.9–1.8)
TOTAL IRON BINDING CAPACITY: 222 UG/DL (ref 260–445)
TRIGL SERPL-MCNC: 213 MG/DL (ref 0–150)
TSH SERPL DL<=0.05 MIU/L-ACNC: 2.15 UIU/ML (ref 0.27–4.2)
URINE TYPE: ABNORMAL
UROBILINOGEN, URINE: 0.2 E.U./DL
VLDLC SERPL CALC-MCNC: 43 MG/DL
WBC # BLD: 7.7 K/UL (ref 4–11)
WBC UA: ABNORMAL /HPF (ref 0–5)

## 2022-03-18 PROCEDURE — 90935 HEMODIALYSIS ONE EVALUATION: CPT

## 2022-03-18 PROCEDURE — 2580000003 HC RX 258: Performed by: NURSE PRACTITIONER

## 2022-03-18 PROCEDURE — 80061 LIPID PANEL: CPT

## 2022-03-18 PROCEDURE — 36415 COLL VENOUS BLD VENIPUNCTURE: CPT

## 2022-03-18 PROCEDURE — 85610 PROTHROMBIN TIME: CPT

## 2022-03-18 PROCEDURE — 85025 COMPLETE CBC W/AUTO DIFF WBC: CPT

## 2022-03-18 PROCEDURE — 1200000000 HC SEMI PRIVATE

## 2022-03-18 PROCEDURE — 97530 THERAPEUTIC ACTIVITIES: CPT

## 2022-03-18 PROCEDURE — 6360000002 HC RX W HCPCS: Performed by: NURSE PRACTITIONER

## 2022-03-18 PROCEDURE — 6370000000 HC RX 637 (ALT 250 FOR IP): Performed by: INTERNAL MEDICINE

## 2022-03-18 PROCEDURE — 6360000002 HC RX W HCPCS: Performed by: INTERNAL MEDICINE

## 2022-03-18 PROCEDURE — 2500000003 HC RX 250 WO HCPCS: Performed by: HOSPITALIST

## 2022-03-18 PROCEDURE — 5A1D70Z PERFORMANCE OF URINARY FILTRATION, INTERMITTENT, LESS THAN 6 HOURS PER DAY: ICD-10-PCS | Performed by: INTERNAL MEDICINE

## 2022-03-18 PROCEDURE — 6370000000 HC RX 637 (ALT 250 FOR IP): Performed by: HOSPITALIST

## 2022-03-18 PROCEDURE — 80048 BASIC METABOLIC PNL TOTAL CA: CPT

## 2022-03-18 PROCEDURE — 94660 CPAP INITIATION&MGMT: CPT

## 2022-03-18 PROCEDURE — 2580000003 HC RX 258: Performed by: HOSPITALIST

## 2022-03-18 PROCEDURE — 85730 THROMBOPLASTIN TIME PARTIAL: CPT

## 2022-03-18 PROCEDURE — 83735 ASSAY OF MAGNESIUM: CPT

## 2022-03-18 PROCEDURE — 93010 ELECTROCARDIOGRAM REPORT: CPT | Performed by: INTERNAL MEDICINE

## 2022-03-18 PROCEDURE — 81001 URINALYSIS AUTO W/SCOPE: CPT

## 2022-03-18 PROCEDURE — 97161 PT EVAL LOW COMPLEX 20 MIN: CPT

## 2022-03-18 RX ORDER — HEPARIN SODIUM 10000 [USP'U]/100ML
2060 INJECTION, SOLUTION INTRAVENOUS CONTINUOUS
Status: DISCONTINUED | OUTPATIENT
Start: 2022-03-18 | End: 2022-03-21

## 2022-03-18 RX ORDER — HEPARIN SODIUM 1000 [USP'U]/ML
3500 INJECTION, SOLUTION INTRAVENOUS; SUBCUTANEOUS
Status: DISCONTINUED | OUTPATIENT
Start: 2022-03-18 | End: 2022-03-20 | Stop reason: ALTCHOICE

## 2022-03-18 RX ORDER — HEPARIN SODIUM 1000 [USP'U]/ML
4000 INJECTION, SOLUTION INTRAVENOUS; SUBCUTANEOUS ONCE
Status: COMPLETED | OUTPATIENT
Start: 2022-03-18 | End: 2022-03-18

## 2022-03-18 RX ORDER — HEPARIN SODIUM 1000 [USP'U]/ML
2000 INJECTION, SOLUTION INTRAVENOUS; SUBCUTANEOUS PRN
Status: DISCONTINUED | OUTPATIENT
Start: 2022-03-18 | End: 2022-03-22 | Stop reason: HOSPADM

## 2022-03-18 RX ORDER — HEPARIN SODIUM 1000 [USP'U]/ML
4000 INJECTION, SOLUTION INTRAVENOUS; SUBCUTANEOUS PRN
Status: DISCONTINUED | OUTPATIENT
Start: 2022-03-18 | End: 2022-03-22 | Stop reason: HOSPADM

## 2022-03-18 RX ORDER — DOXERCALCIFEROL 2 UG/ML
INJECTION, SOLUTION INTRAVENOUS
Status: DISPENSED
Start: 2022-03-18 | End: 2022-03-18

## 2022-03-18 RX ORDER — HEPARIN SODIUM 1000 [USP'U]/ML
4500 INJECTION, SOLUTION INTRAVENOUS; SUBCUTANEOUS PRN
Status: DISCONTINUED | OUTPATIENT
Start: 2022-03-18 | End: 2022-03-20 | Stop reason: ALTCHOICE

## 2022-03-18 RX ORDER — DOXERCALCIFEROL 2 UG/ML
15 INJECTION, SOLUTION INTRAVENOUS
Status: DISCONTINUED | OUTPATIENT
Start: 2022-03-18 | End: 2022-03-22 | Stop reason: HOSPADM

## 2022-03-18 RX ORDER — HEPARIN SODIUM 1000 [USP'U]/ML
INJECTION, SOLUTION INTRAVENOUS; SUBCUTANEOUS
Status: DISPENSED
Start: 2022-03-18 | End: 2022-03-18

## 2022-03-18 RX ADMIN — GABAPENTIN 100 MG: 100 CAPSULE ORAL at 15:22

## 2022-03-18 RX ADMIN — OXYCODONE AND ACETAMINOPHEN 1 TABLET: 5; 325 TABLET ORAL at 15:22

## 2022-03-18 RX ADMIN — DOCUSATE SODIUM 100 MG: 100 CAPSULE, LIQUID FILLED ORAL at 21:07

## 2022-03-18 RX ADMIN — CYCLOBENZAPRINE 5 MG: 10 TABLET, FILM COATED ORAL at 17:23

## 2022-03-18 RX ADMIN — PANTOPRAZOLE SODIUM 40 MG: 40 TABLET, DELAYED RELEASE ORAL at 05:43

## 2022-03-18 RX ADMIN — SODIUM CHLORIDE, PRESERVATIVE FREE 10 ML: 5 INJECTION INTRAVENOUS at 15:35

## 2022-03-18 RX ADMIN — EPOETIN ALFA-EPBX 8000 UNITS: 10000 INJECTION, SOLUTION INTRAVENOUS; SUBCUTANEOUS at 11:00

## 2022-03-18 RX ADMIN — NEPHROCAP 1 MG: 1 CAP ORAL at 15:22

## 2022-03-18 RX ADMIN — DOXERCALCIFEROL 15 MCG: 4 INJECTION, SOLUTION INTRAVENOUS at 12:00

## 2022-03-18 RX ADMIN — HEPARIN SODIUM 4000 UNITS: 1000 INJECTION INTRAVENOUS; SUBCUTANEOUS at 17:33

## 2022-03-18 RX ADMIN — HEPARIN SODIUM 4500 UNITS: 1000 INJECTION INTRAVENOUS; SUBCUTANEOUS at 11:00

## 2022-03-18 RX ADMIN — METOPROLOL SUCCINATE 50 MG: 50 TABLET, EXTENDED RELEASE ORAL at 17:23

## 2022-03-18 RX ADMIN — CINACALCET HYDROCHLORIDE 30 MG: 30 TABLET, FILM COATED ORAL at 21:10

## 2022-03-18 RX ADMIN — CALCIUM ACETATE 667 MG: 667 CAPSULE ORAL at 15:22

## 2022-03-18 RX ADMIN — INSULIN LISPRO 10 UNITS: 100 INJECTION, SOLUTION INTRAVENOUS; SUBCUTANEOUS at 15:25

## 2022-03-18 RX ADMIN — DOCUSATE SODIUM 100 MG: 100 CAPSULE, LIQUID FILLED ORAL at 15:22

## 2022-03-18 RX ADMIN — QUETIAPINE FUMARATE 50 MG: 25 TABLET ORAL at 21:07

## 2022-03-18 RX ADMIN — HEPARIN SODIUM 3500 UNITS: 1000 INJECTION INTRAVENOUS; SUBCUTANEOUS at 07:55

## 2022-03-18 RX ADMIN — HEPARIN SODIUM 4000 UNITS: 1000 INJECTION INTRAVENOUS; SUBCUTANEOUS at 21:48

## 2022-03-18 RX ADMIN — Medication 1000 UNITS/HR: at 17:34

## 2022-03-18 RX ADMIN — PRAVASTATIN SODIUM 40 MG: 40 TABLET ORAL at 17:23

## 2022-03-18 RX ADMIN — DULOXETINE 60 MG: 60 CAPSULE, DELAYED RELEASE ORAL at 12:00

## 2022-03-18 RX ADMIN — GABAPENTIN 100 MG: 100 CAPSULE ORAL at 21:08

## 2022-03-18 RX ADMIN — INSULIN LISPRO 2 UNITS: 100 INJECTION, SOLUTION INTRAVENOUS; SUBCUTANEOUS at 15:26

## 2022-03-18 RX ADMIN — INSULIN GLARGINE 30 UNITS: 100 INJECTION, SOLUTION SUBCUTANEOUS at 21:06

## 2022-03-18 ASSESSMENT — PAIN SCALES - GENERAL
PAINLEVEL_OUTOF10: 8
PAINLEVEL_OUTOF10: 3
PAINLEVEL_OUTOF10: 0
PAINLEVEL_OUTOF10: 8
PAINLEVEL_OUTOF10: 8
PAINLEVEL_OUTOF10: 3

## 2022-03-18 ASSESSMENT — PAIN - FUNCTIONAL ASSESSMENT
PAIN_FUNCTIONAL_ASSESSMENT: PREVENTS OR INTERFERES SOME ACTIVE ACTIVITIES AND ADLS
PAIN_FUNCTIONAL_ASSESSMENT: PREVENTS OR INTERFERES SOME ACTIVE ACTIVITIES AND ADLS

## 2022-03-18 ASSESSMENT — PAIN DESCRIPTION - LOCATION
LOCATION: BACK

## 2022-03-18 ASSESSMENT — PAIN DESCRIPTION - PAIN TYPE
TYPE: CHRONIC PAIN

## 2022-03-18 ASSESSMENT — PAIN DESCRIPTION - FREQUENCY: FREQUENCY: CONTINUOUS

## 2022-03-18 ASSESSMENT — PAIN DESCRIPTION - DESCRIPTORS: DESCRIPTORS: ACHING;DISCOMFORT

## 2022-03-18 ASSESSMENT — PAIN DESCRIPTION - ORIENTATION: ORIENTATION: LOWER

## 2022-03-18 NOTE — CARE COORDINATION
Palliative care team made aware of pt desires to discuss goals of care. PT lives w/spouse in [place], using a walker, active w/[facility] MWF @ [phone]. Pt had referral to [facility] last admission, but communication delays caused services not to be initiated. CM will continue to follow for needs, including possible new home O2 and HHC needs.   Meghan Ricci RN

## 2022-03-18 NOTE — PROGRESS NOTES
03/18/22 0016   NIV Type   $NIV $Daily Charge   Skin Protection for O2 Device No  (pt refused mepilex)   Equipment Type v60   Mode CPAP   Mask Type Full face mask   Mask Size Medium   Settings/Measurements   IPAP 12 cmH20   Resp 18   FiO2  30 %   I Time/ I Time % 549 s   Vt Exhaled 9.2 mL   Mask Leak (lpm) 55 lpm   Comfort Level Good   Using Accessory Muscles No   SpO2 97   Alarm Settings   Alarms On Y   Press Low Alarm 6 cmH2O   High Pressure Alarm 30 cmH2O   Apnea (secs) 20 secs   Resp Rate Low Alarm 6   High Respiratory Rate 40 br/min

## 2022-03-18 NOTE — PROGRESS NOTES
Spoke to Dr Ena Pang about this [facility] exchange order. Pt is on plavix and eliquis need to hold blood thinners starting today. Pt is a high bleed score risk. Will revaluate line on Monday. Dr Ariella Tapia and Austyn Garcia RN made aware.

## 2022-03-18 NOTE — PROGRESS NOTES
Comprehensive Nutrition Assessment    Type and Reason for Visit:  Initial,Positive Nutrition Screen (diet education, wounds)    Nutrition Recommendations/Plan:   1. Continue carb control, 2 gm sodium, low potassium, low phosphorus, and 1200 mL FR - monitor need for liberalization if needed   2. Add Glucerna daily   3. Monitor nutrition adequacy, pertinent labs, bowel habits, wt changes, and clinical progress    Nutrition Assessment:  47 yo male admitted with CHF and pulmonary edema 2/2 HD non-compliance. Palliative care consulted. Pt diet advanced to carb control, 2 gm sodium, low potassium, low phosphorus, and 1200 mL FR. Pt reports good appetite and PO intakes PTA, eating % of meals since admission. Pt requested ONS, will add. Declined diet education discussion and handouts, cheese and pepperoni on bedside table. Has been provided diet education several times in the past. Will continue to monitor. Malnutrition Assessment:  Malnutrition Status: At risk for malnutrition (Comment)    Context:  Acute Illness       Estimated Daily Nutrient Needs:  Energy (kcal):  9798-3542 kcal; Weight Used for Energy Requirements:  Ideal (72 kg)     Protein (g):  72-87 g; Weight Used for Protein Requirements:  Ideal (1.0-1.2 g/kg)        Fluid (ml/day):  1200 mL FR;     Nutrition Related Findings:  Na 132. K 5.5. +1 generalized edema. Active BS. Wounds:  None       Current Nutrition Therapies:    ADULT DIET; Regular; 4 carb choices (60 gm/meal); Low Sodium (2 gm); Low Potassium (Less than 3000 mg/day);  Low Phosphorus (Less than 1000 mg); 1200 ml    Anthropometric Measures:  · Height: 5' 9\" (175.3 cm)  · Current Body Weight: 247 lb (112 kg)   · Ideal Body Weight: 160 lbs; % Ideal Body Weight 154.4 %   · BMI: 36.5  · BMI Categories: Obese Class 2 (BMI 35.0 -39.9)       Nutrition Diagnosis:   · Increased nutrient needs related to increase demand for energy/nutrients as evidenced by dialysis      Nutrition Interventions:   Food and/or Nutrient Delivery:  Continue Current Diet,Start Oral Nutrition Supplement  Nutrition Education/Counseling:  Education declined   Coordination of Nutrition Care:  Continue to monitor while inpatient    Goals:  Pt will consume 50% or greater of meals and ONS this admission       Nutrition Monitoring and Evaluation:   Behavioral-Environmental Outcomes:  Readiness for Change   Food/Nutrient Intake Outcomes:  Food and Nutrient Intake,Supplement Intake  Physical Signs/Symptoms Outcomes:  Biochemical Data,Fluid Status or Edema,Nutrition Focused Physical Findings,Weight     Discharge Planning:    Continue current diet,Continue Oral Nutrition Supplement     Electronically signed by Eloy Wallis MS, RD, LD on 3/18/22 at 3:22 PM EDT    Contact: [phone]

## 2022-03-18 NOTE — FLOWSHEET NOTE
Treatment time: 4 hours  Net UF: 2000 ml    Pre weight: 114.5 kg   Post weight: 112.4 kg  EDW: 110 kg    Access used: Left chest wall TDC  Access function: OK with -350 ml/min positional,     Medications or blood products given: Heparin, Hectorol, Retacrit    Regular outpatient schedule: [facility], HD again tomorrow    Summary of response to treatment: Pt tolerated ok with HD, TDC sucking when pt's BP dropped. Reduced UF goal. Keep sBP >100 during HD, HD completed in full, heparin dwell in TDC, capped and clamped. Cirt Line: Initial Hct: 27.8;   End Profile : A; Refill ( Hct.1 -32.0  subtract Hct 2- 31.7): 0.3 ( no Refill); BV: -12.4 %      Copy of dialysis treatment record placed in chart, to be scanned into EMR.     03/18/22 0739 03/18/22 1210   Vital Signs   /66 (!) 144/60   Temp 97.6 °F (36.4 °C) 97.4 °F (36.3 °C)   Pulse 71 75   Resp 22 20   Weight 252 lb 6.8 oz (114.5 kg) 247 lb 12.8 oz (112.4 kg)   Weight Method Actual;Bed scale Actual;Bed scale   Percent Weight Change -4.1 -1.83   Dry Weight 242 lb 8.1 oz (110 kg)  --    Post-Hemodialysis Assessment   Post-Treatment Procedures  --  Blood returned;Catheter capped, clamped and heparinized x 2 ports   Machine Disinfection Process  --  Acid/Vinegar Clean;Heat Disinfect; Exterior Machine Disinfection   Rinseback Volume (ml)  --  300 ml   Total Liters Processed (l/min)  --  61.5 l/min   Dialyzer Clearance  --  Heavily streaked   Duration of Treatment (minutes)  --  240 minutes   Heparin amount administered during treatment (units)  --  3500 units   Hemodialysis Intake (ml)  --  400 ml   Hemodialysis Output (ml)  --  2400 ml   NET Removed (ml)  --  2000 ml   Tolerated Treatment  --  Fair

## 2022-03-18 NOTE — CONSULTS
Pharmacy to Manage Heparin Infusion per [facility]    Dx: A-fib  Pt wt = 87.2 kg (will use adjusted body weight). Baseline aPTT = 36.1 sec at 1440. ** Last dose of Apixaban on 3/17 at 2100; will use aPTT until 72hr washout period ends    Oral factor Xa-inhibitors may alter and elevate anti-Xa levels used for unfractionated heparin monitoring. As a result, anti-Xa monitoring is not accurate while Xa-inhibitor activity is detectable. Utilize aPTT monitoring when patient received an oral factor Xa-inhibitor (apixaban, betrixaban, edoxaban or rivaroxaban) within 72 hours prior to admission (please document last administration time). The goal is to allow a washout of oral factor Xa-inhibitors by using aPTT for 72 hours, then change to ant-Xa levels for UFH. Low Dose Heparin Infusion  Heparin 60 units/kg IVP bolus followed by Heparin infusion at 12 units/kg/hr (recommended initial max dose 1000 units./hr). Recheck aPTT in 6 hours. Goal anti-Xa 0.3-0.7 IU/mL  Goal aPTT = 60-90 seconds. Pharmacy to manage Heparin - contact for questions. aPTT < 45    Heparin 60 units/kg bolus  Increase infusion by 4 units/kg/hr  aPTT 45 - 59.9 Heparin 30 units/kg bolus Increase infusion by 2 units/kg/hr  aPTT 60 - 90    No bolus No change   aPTT 90.1 - 97.5 No bolus Decrease infusion by 1 units/kg/hr  aPTT 97.6 - 105 No bolus Decrease infusion by 2 units/kg/hr  aPTT > 105    Hold heparin for 1 hour Decrease infusion by 3 units/kg/hr    Obtain aPTT 6 hours after bolus and 6 hours after any dose change until two consecutive therapeutic aPTT are achieved- then daily. Tracie Delgado, PharmD 3/18/2022  2:42 PM     3/18 2119  Aptt= 44.2  Administer 4000 unit heparin bolus.   Increase heparin infusion rate to 1350 units/hr  Next aPTT 3/19 0345  Tejal Spangler, PharmD 9:35 PM EDT 3/18/22    APTT [[id number]] (Abnormal)    Collected: 03/19/22 0334    Updated: 03/19/22 4987    Specimen Source: Blood     aPTT 48.7 High  sec 2000 bolus; rate = 15.5 ml/hr  Aptt 1100  Lai Resendiz Pharm BELKYS.3/19/2022 5:01 AM    3/19  aPTT = 48.8 sec at 1139. Give heparin bolus of 2000 units and increase heparin gtt to 1720 units/hr. Recheck aPTT in 6 hours. Lino Gipson PharmD  3/19/2022 12:15 PM    3/19  aPTT = 50.8 sec at 1838. Give heparin bolus of 2000 units and increase heparin gtt to [address] [place] units/hr. Recheck aPTT in 6 hours. Lino Gipson PharmD  3/19/2022 7:05 PM      APTT [[id number]] (Abnormal)    Collected: 03/20/22 0052    Updated: 03/20/22 0116    Specimen Source: Blood     aPTT 73.6 High  sec     3/20   aPTT - 55.2sec @ 0824  Give 2000unit heparin bolus and increase infusion to 2060units/hr  Next aPTT in 6 hours  Mónica Frias PharmD 3/20/2022 12:14 PM     3/20  aPTT = 75 sec at 1903. Continue heparin gtt at 2060 units/hr. Recheck aPTT in 6 hours.   Lino Gipson PharmD  3/20/2022 8:02 PM    APTT [[id number]] (Abnormal)    Collected: 03/21/22 0024    Updated: 03/21/22 0059    Specimen Source: Blood     aPTT 64.8 High  sec     Switch to daily Anti-Xa 3/22 0600  Lai Resendiz Pharm BELKYS.3/21/2022 1:50 AM

## 2022-03-18 NOTE — PROGRESS NOTES
03/17/22 2114   NIV Type   $NIV $Daily Charge   Skin Assessment Clean, dry, & intact   Skin Protection for O2 Device No  (pt refused mepilex )   Equipment Type v60   Mode CPAP   Mask Type Full face mask   Mask Size Medium   Settings/Measurements   CPAP/EPAP 12 cmH2O   Resp 19   FiO2  30 %   I Time/ I Time % 611 s   Vt Exhaled 16 mL   Mask Leak (lpm) 48 lpm   Comfort Level Good   Using Accessory Muscles No   SpO2 100   Alarm Settings   Alarms On Y   Press Low Alarm 6 cmH2O   High Pressure Alarm 30 cmH2O   Apnea (secs) 20 secs   Resp Rate Low Alarm 6   High Respiratory Rate 40 br/min

## 2022-03-18 NOTE — CONSULTS
Palliative Care Initial Note  Palliative Care Admit date:  3/18/22  Reason for c/s:   GOC, Family support    Advance Directives:  Reviewed pts AD's in this EMR and he designated his spouse, Thuy Montiel, as his healthcare proxy. Pt also went on to write on his [address] \"do not resuscitate if my quality of life will be 40%, I do not want to be dependent on anyone for my day to day care. \"    Full code in place. Deferred discussion on this visit. Plan of care/goals:  Pt reports HD is difficult to tolerate \"because of the cramping. \"  He states \"I try not to miss those appts but, for instance, the last time I  slept through my alarm from having diarrhea all night after taking the medicine they gave me. \"  He states he is of the mind to \"just accept\" his physical discomfort r/t HD \"because I'll die w/o it. \"  Jarocho Jarvis reports being burdened by his many recurrent hospitalizations but doesn't see his part re: cause and effect. Ricardo Lavelle met w/ [facility] NP during Feb admission. Apparently, pts spouse was unaware of the gravity of pts co-morbidities. Chaseflavia Juniecharlotte denies having prior ACP discussions w/ provider(s). However, writer read the [employer] NP note from his last adm and they did have acp discussion. He is willing to have [facility] involved as a resource for other sx mgt, to help support ACP discussions and, perhaps, continue to try to help pt access homemaking supports. He is agreeable to having [facility] come out after d/c. Social/Spiritual:  Per Jarocho Jarvis, he and his wife, Thuy Montiel, both require chronic, serious illness monitoring. He states, although he does drive himself to HD, they must have their groceries delivered as neither could physically manage the task of shopping. He is opposed to the notion of LTC placement. Pt has [employer] ins and states that he/they are approved for [employer].   He reports having a HHA from [facility] previously who quit and that agency, unfortunately, has not been able to fill that vacancy. He does have step-dtr's but Jarocho Jarvis denies they have much involvement. During last adm, ref was made to [facility] for nsg, pt/ot. Plan:  Can always make ref to [facility]. However, would be prudent to gather input of provider's and discuss during another family mtg  pts px. As long as wife supports pt returning home, he is unlikely to elect long term placement. Will plan f/u w/ pt and spouse, even if pt is d/c before Monday, to have ongoing acp discussions. Symptom Assessment:         Symptoms       Present Y/N          Medications    Doses in last          24 hours    Pain C/o chronic, constant lower back pain, resulting from a fall. Pt reports incomplete relief w/ analgesia. Pt reports PCP manages his analgesia refills  Percocet 1 tab Q6 hr PRN    Flexeril 5mg TID PRN  Neurontin 100mg TID None since just having been admitted          Scheduled     N/V Pt does endorse intermittent bouts w/ N/V @ home. Denies taking antiemetic, \"It gets better after I throw up. \" Phenergan 12.5mg Q6 hr PRN    Zofran 4mg Q6hr PRN None since just having been admitted. Bowel regime/   Constipation   Date of last BM   No stool op doc'd thus far since adm Senokot 1 tab daily PRN  Colace 100mg BID      Just admitted        Scheduled     Anxiety/agitation       Depression  Cymbalta 60mg daily       Scheduled     SOB       Appetite 247lbs      Sleep  Seroquel 50mg nightly       Scheduled    Palliative Care Performance Status 40%  Uses a scooter or a walk-behind walker; states he cannot walk distances. [x] 40%  Mainly in bed; Extensive disease. Mainly assist, Intake normal or reduced;  Occasional assist; LOC full/confusion      Reason for consult:  _X_ Advance Care Planning  ___ Transition of Care Planning  ___ Psychosocial/Spiritual Support  ___ Symptom Management                                                                                                                                                                                                              Krystal Pierce RN

## 2022-03-18 NOTE — PROGRESS NOTES
03/18/22 0438   NIV Type   Equipment Type v60   Mode CPAP   Mask Type Full face mask   Mask Size Medium   Settings/Measurements   CPAP/EPAP 12 cmH2O   Resp 14   FiO2  30 %   I Time/ I Time % 512 s   Vt Exhaled 7.7 mL   Comfort Level Good   Using Accessory Muscles No   SpO2 98   Alarm Settings   Alarms On Y   Press Low Alarm 6 cmH2O   High Pressure Alarm 30 cmH2O   Apnea (secs) 20 secs   Resp Rate Low Alarm 6   High Respiratory Rate 40 br/min

## 2022-03-18 NOTE — PROGRESS NOTES
Spoke to nurse about [facility] exchange. Pt is currently in HD at this time. Will review with my IR doctor. Asked to keep patient NPO and to hold blood thinners at this time. PT/INR ordered.

## 2022-03-18 NOTE — PLAN OF CARE
Problem: Nutrition  Goal: Optimal nutrition therapy  Outcome: Ongoing  Note: Nutrition Problem #1: Increased nutrient needs  Intervention: Food and/or Nutrient Delivery: Continue Current Diet,Start Oral Nutrition Supplement  Nutritional Goals: Pt will consume 50% or greater of meals and ONS this admission

## 2022-03-18 NOTE — PROGRESS NOTES
Occupational Therapy    Chart reviewed, attempted to see pt for evalauation  this date; Per RN hold therapy this date, pt in dialysis & then going for procedure later this pm.    Bandar Guerra, OT

## 2022-03-18 NOTE — PLAN OF CARE
Problem: Pain:  Goal: Pain level will decrease  Description: Pain level will decrease  Outcome: Ongoing   Pt will be satisfied with pain control. Pt uses numeric pain rating scale with reassessments after pain med administration. Will continue to monitor progression throughout shift. Problem: Skin Integrity:  Goal: Will show no infection signs and symptoms  Description: Will show no infection signs and symptoms  Outcome: Ongoing   Pt is at risk for skin breakdown. Pt will have skin assessments every shift, Q2 turns, heels elevated off of the bed, and friction and shear prevented when possible. Will continue to monitor for signs of skin breakdown and enforce prevention measures.

## 2022-03-18 NOTE — PROGRESS NOTES
Nephrology Progress Note   [facility]      This patient is a 46year old male whom we are following for ESKD. Subjective: The patient was seen and examined on dialysis. BP stable. On start of dialysis BFR was 150ccmin, given IV heparin and patient laid on his L side, TDC seems to be working better. Family History: No family at bedside  ROS: No fever or chills      Vitals:  /66   Pulse 71   Temp 97.6 °F (36.4 °C)   Resp 22   Ht 5' 9\" (1.753 m)   Wt 252 lb 6.8 oz (114.5 kg)   SpO2 96%   BMI 37.28 kg/m²   I/O last 3 completed shifts: In: 245 [P.O.:240; I.V.:5]  Out: 300 [Urine:300]  No intake/output data recorded. Physical Exam:  Physical Exam  Vitals reviewed. Constitutional:       Appearance: Normal appearance. HENT:      Head: Normocephalic and atraumatic. Eyes:      General: No scleral icterus. Conjunctiva/sclera: Conjunctivae normal.   Cardiovascular:      Rate and Rhythm: Normal rate. Heart sounds: No friction rub. Pulmonary:      Effort: Pulmonary effort is normal. No respiratory distress. Abdominal:      General: Bowel sounds are normal. There is no distension. Tenderness: There is no abdominal tenderness. Musculoskeletal:      Right lower leg: No edema. Left lower leg: No edema. Neurological:      Mental Status: He is alert.        Access: LIJ TDC      Medications:   heparin (porcine)  3,500 Units IntraVENous Q MWF    doxercalciferol  15 mcg IntraVENous Q MWF    epoetin siddharth-epbx  8,000 Units IntraVENous Q MWF    heparin (porcine)        epoetin siddharth-epbx        epoetin siddharth-epbx        doxercalciferol        lidocaine  1 patch TransDERmal Daily    apixaban  5 mg Oral BID    b complex-C-folic acid  1 capsule Oral Daily    calcium acetate  1 capsule Oral TID WC    clopidogrel  75 mg Oral Daily    docusate sodium  100 mg Oral BID    DULoxetine  60 mg Oral Daily    fluticasone  1 spray Each Nostril Daily    gabapentin  100 mg Oral TID    insulin glargine  30 Units SubCUTAneous Nightly    linaclotide  145 mcg Oral QAM AC    metoprolol succinate  50 mg Oral BID    pantoprazole  40 mg Oral QAM AC    pravastatin  40 mg Oral Daily    QUEtiapine  50 mg Oral Nightly    tiotropium-olodaterol  2 puff Inhalation Daily    insulin lispro  0-12 Units SubCUTAneous TID WC    insulin lispro  0-6 Units SubCUTAneous Nightly    sodium chloride flush  10 mL IntraVENous 2 times per day    insulin lispro  0.08 Units/kg SubCUTAneous TID WC    cinacalcet  30 mg Oral Q MWF         Labs:  Recent Labs     03/17/22  1258 03/18/22  0657   WBC 11.2* 7.7   HGB 10.7* 10.1*   HCT 32.7* 31.2*   MCV 88.5 90.7    292     Recent Labs     03/17/22  1258 03/17/22  2312 03/18/22  0657   * 130* 132*   K 5.6* 4.8 5.5*   CL 88* 93* 95*   CO2 20* 22 21   GLUCOSE 137* 234* 127*   PHOS  --  5.3*  --    MG  --   --  2.00   BUN 76* 84* 83*   CREATININE 9.8* 10.1* 10.6*   LABGLOM 6* 5* 5*   GFRAA 7* 7* 6*           Assessment/Plan:    ESKD. - On HD [address] - Last full HD was 3/11/22.  - TW of 110kg. - HD today and tomorrow.     Non-functioning LIJ TDC.  - TDC seems to be working better today. Plan to run again tomorrow to further evaluate. - IR aware of possible TDC replacement on Monday if still with issue. For the meantime, will hold AC.     Hyperkalemia.  - From missed dialysis. - Received Lokelma 10g x 3 doses.     Hypervolemic Hyponatremia. - UF with HD.  - Fluid restriction.     Hypertension.  - On Metoprolol.     CKD-MBD.  - Continue calcium acetate, Hectorol and Cinacalcet.     Anemia.  - On EPO qHD. Please do not hesitate to contact me at [phone] if with questions. Thank you! Tj Stokes MD  [facility]  3/18/2022

## 2022-03-18 NOTE — PROGRESS NOTES
Physical Therapy    Facility/Department: [facility] A2 CARD TELEMETRY  Initial Assessment, Treatment and Discharge Summary    NAME: Leander Garcia  : 1970  MRN: [id number]    Date of Service: 3/18/2022    Discharge Recommendations:  Home with assist PRN   PT Equipment Recommendations  Equipment Needed: No  Other: patient said that he alreaday has a walker at home. Assessment   Assessment: Patient is a 46year old male admitted to [facility] on 3/17/22  with ESRD, dyspnea, HTN. Patient stated that he normally ambulates very short (~15 feet) household distances at baseline with a rollator walker and supervision from his son/other family members. Patient today ambulated 15 feet with modified independence. Patient appears to be at his baseline level of function. Patient has met his acute PT goals. No additional skilled acute PT needs. Patient is safe for home, when medically stable, with PRN supervision/assistance and continued use of his rollator walker. PT signing off. Treatment Diagnosis: decreased independence with functional mobility. Specific instructions for Next Treatment: N/A PT signing off. Prognosis: Good  Decision Making: Low Complexity  PT Education: Goals;PT Role;Disease Specific Education;Gait Training;General Safety;Plan of Care; Injury Prevention; Functional Mobility Training  Patient Education: Disease specific education: Educated patient on the benefits of increased mobility, use of call bell. Patient verbalized understanding. Barriers to Learning: none  No Skilled PT: At baseline function  REQUIRES PT FOLLOW UP: No  Activity Tolerance  Activity Tolerance: Patient Tolerated treatment well  Activity Tolerance: Vitals: 137/70 99% on 2L. 83 BPM. post activity: 98% on room air on exertion. HR 84 BPM.       Patient Diagnosis(es): The primary encounter diagnosis was ESRD (end stage renal disease) ([facility] [address]  Diagnoses of Hypertensive urgency, Dyspnea and respiratory abnormalities, Elevated troponin, and Elevated brain natriuretic peptide (BNP) level were also pertinent to this visit. has a past medical history of Ambulatory dysfunction, Aortic stenosis, Arthritis, Asthma, Bilateral hilar adenopathy syndrome, CAD (coronary artery disease), Cardiomyopathy ([address] CHF (congestive heart failure) ([address] Chronic pain, COPD (chronic obstructive pulmonary disease) ([address] Depression, Diabetes mellitus ([address] Difficult intravenous access, Emphysema of lung ([address] ESRD (end stage renal disease) on dialysis ([address] Fear of needles, Gastric ulcer, GERD (gastroesophageal reflux disease), Heart valve problem, Hemodialysis patient ([address] History of spinal fracture, Hx of blood clots, Hyperlipidemia, Hypertension, MI (myocardial infarction) ([address] Neuromuscular disorder ([address] Numbness and tingling in left arm, Pneumonia, PONV (postoperative nausea and vomiting), Prolonged emergence from general anesthesia, Sleep apnea, Stroke ([address] TIA (transient ischemic attack), and Unspecified diseases of blood and blood-forming organs. has a past surgical history that includes Tonsillectomy; cyst removal (08/14/2013); Colonoscopy; Coronary angioplasty with stent (05/26/15); vascular surgery (aprx 2 years ago); Colonoscopy (2/29/2015); Upper gastrointestinal endoscopy (01/06/2016); Upper gastrointestinal endoscopy (01/29/2017); other surgical history (02/01/2017); Dialysis fistula creation (Left, 10/30/2017); Diagnostic Cardiac Cath Lab Procedure; other surgical history (2018); other surgical history (Bilateral, 06/26/2018); pr lap insertion tunneled intraperitoneal catheter (N/A, 9/21/2018); other surgical history (Right, 09/2018); Dialysis fistula creation (Left, 3/27/2019); other surgical history (05/28/2019); Endoscopy, colon, diagnostic; Catheter Removal (Right, 7/2/2019); Aortic valve replacement (N/A, 10/15/2019); and Rectal surgery (N/A, 2/24/2022).     Restrictions  Restrictions/Precautions  Restrictions/Precautions: General Precautions,Fall Risk  Position Activity Restriction  Other position/activity restrictions: bedrest to commode, medium fall risk per nsg assessment  Vision/Hearing  Vision: Impaired  Vision Exceptions: Wears glasses for reading  Hearing: Within functional limits     Subjective  General  Chart Reviewed: Yes  Patient assessed for rehabilitation services?: Yes  Family / Caregiver Present: Yes  Referring Practitioner: Santi Gaitan DO  Referral Date : 03/17/22  Diagnosis: dyspnea, ESRD, HTN  General Comment  Comments: Supine in bed upon entry of therapy staff. Cleared for therapy by RN. Subjective  Subjective: Patient agreed to participate. Pain Screening  Patient Currently in Pain: Yes  Pain Assessment  Pain Assessment: 0-10  Pain Level: 8  Pain Type: Chronic pain  Pain Location: Back  Functional Pain Assessment: Prevents or interferes some active activities and ADLs  Non-Pharmaceutical Pain Intervention(s): Ambulation/Increased Activity;Repositioned; Rest  Response to Pain Intervention: Patient Satisfied  Vital Signs  Temp: 98.2 °F (36.8 °C)  Temp Source: Oral  Pulse: 83  BP: 137/70  BP Location: Right upper arm  Patient Currently in Pain: Yes  Oxygen Therapy  SpO2: 99 %  O2 Device: Nasal cannula  O2 Flow Rate (L/min): 2 L/min  Pre Treatment Pain Screening  Intervention List: Patient able to continue with treatment    Orientation  Orientation  Overall Orientation Status: Within Normal Limits  Social/Functional History  Social/Functional History  Lives With: Quirino Abernathy (wife is disabled. son works part time.)  Type of Home: [address] [place] [address],[address]: One level  Home Access: Ramped entrance  Bathroom Shower/Tub: Walk-in shower  Bathroom Toilet: [address]: [address] bars in [address] [place], [place] [address]: 4 wheeled walker,Hospital bed  ADL Assistance: Independent  Homemaking Assistance: Needs assistance (family does cleaning.  patient said he utilizes a service \"similar to meals on wheels\" to deliver his food)  Meal Prep: Total  Laundry: Total  Vacuuming: Total  Cleaning: Total  Ambulation Assistance: Independent (walks ~ 20 feet with a rollator at baseline with his son nearby.)  Transfer Assistance: Independent  Active : Yes  Occupation: Retired  Additional Comments: Patient said that he has fallen approximately once a month. No serious injuries. Cognition   Cognition  Cognition Comment: flat affect. but otherwise WFL    Objective     Observation/Palpation  Posture: Fair    PROM RLE (degrees)  RLE PROM: WNL  AROM RLE (degrees)  RLE AROM: WNL  PROM LLE (degrees)  LLE PROM: WNL  AROM LLE (degrees)  LLE AROM : WNL  Strength RLE  Strength RLE: WFL  Strength LLE  Strength LLE: WFL  Motor Control  Gross Motor?: WFL  Sensation  Overall Sensation Status: Impaired (numbness in bilateral legs)  Bed mobility  Supine to Sit: Modified independent  Sit to Supine: Modified independent  Scooting: Modified independent  Comment: head of bed elevated  Transfers  Sit to Stand: Modified independent  Stand to sit: Modified independent  Bed to Chair: Modified independent  Ambulation  Ambulation?: Yes  More Ambulation?: No  Ambulation 1  Surface: level tile  Device: No Device  Assistance: Modified Independent  Quality of Gait: wide base of support, cueing to correct. no loss of balanc  Gait Deviations: Slow Ольга; Increased ALANNAH; Decreased step length;Decreased step height  Distance: 15 feet. (patient said that this is approximately how far he walks at baseline). Comments: No complaints of chest pain, dizziness. he had some shortness of breath but he said that this is his baseline. Stairs/Curb  Stairs?: No (patient does not have any stairs that he has to do at home.)     Balance  Posture: Fair  Sitting - Static: Good  Sitting - Dynamic: Good  Standing - Static: Good  Standing - Dynamic: Good  Comments: no assistive device        Plan   Plan  Times per week: N/A PT signing off. Plan weeks: N/A PT signing off.   Specific instructions for Next Treatment: N/A PT signing off. Safety Devices  Type of devices: All fall risk precautions in place,Call light within reach,Nurse notified,Gait belt,Patient at risk for falls,Left in bed (alarm off upon entry of therapy)    AM-PAC Score     AM-PAC Inpatient Mobility without Stair Climbing Raw Score : 20 (03/18/22 1633)  AM-PAC Inpatient without Stair Climbing T-Scale Score : 60.57 (03/18/22 1633)  Mobility Inpatient CMS 0-100% Score: 0 (03/18/22 1633)  Mobility Inpatient without Stair CMS G-Code Modifier : [facility] (03/18/22 1633)       Goals  Short term goals  Time Frame for Short term goals: 1 session 3/18/22  Short term goal 1: Patient will be independent with bed mobility. Goal met 3/18/22  Short term goal 2: Patient will transfer with independence. Goal met 3/18/22  Short term goal 3: Patient will ambulate 15 feet with modified independence. Goal met 3/18/22  Patient Goals   Patient goals : To go home.        Therapy Time   Individual Concurrent Group Co-treatment   Time In 9187         Time Out 1624         Minutes 20         Timed Code Treatment Minutes: 10 Minutes (10 minute evaluation)       Abby Womack, PT

## 2022-03-18 NOTE — PROGRESS NOTES
Hospitalist Progress Note      PCP: Ale Cummins MD    Date of Admission: 3/17/2022    Chief Complaint: SOB    Hospital Course: Mercedez Flores is a 46 y.o. male who presented to the ED where for acute worsening dyspnea s/p skipping HD session yesterday. Review of epic chart reveals the patient is hospitalized almost twice every month related to his inability to appropriately care for himself. The patient has multiple comorbidities including: ESRD on HD, COPD/ZAINAB, IDDM, CVA with residual deficit, and CHF with renovascular HTN. Patient is  again altered upon arrival, with concerns for polypharmacy (which has been expressed by several providers at different encounters).    Upon arrival to the ED patient was markedly hypertensive 214/105. Stat EKG revealed sinus tachycardia without evidence of acute ischemia. Notable labs include: Hyponatremia 127, hyperkalemia 5.6, BUN/CR 76/9.8 glucose 137, BNP 55,392, and anemia H/H 10.7/22.7. ED provider contacted [facility] to discuss need for urgent hemodialysis and correction of current HD access site. Subjective: NAD. HD this am. No voiced complaints. Updated patient and spouse.        Medications:  Reviewed    Infusion Medications    dextrose      sodium chloride       Scheduled Medications    heparin (porcine)  3,500 Units IntraVENous Q MWF    doxercalciferol  15 mcg IntraVENous Q MWF    epoetin siddharth-epbx  8,000 Units IntraVENous Q MWF    heparin (porcine)        epoetin siddharth-epbx        epoetin siddharth-epbx        doxercalciferol        lidocaine  1 patch TransDERmal Daily    [Held by provider] apixaban  5 mg Oral BID    b complex-C-folic acid  1 capsule Oral Daily    calcium acetate  1 capsule Oral TID WC    [Held by provider] clopidogrel  75 mg Oral Daily    docusate sodium  100 mg Oral BID    DULoxetine  60 mg Oral Daily    fluticasone  1 spray Each Nostril Daily    gabapentin  100 mg Oral TID    insulin glargine  30 Units SubCUTAneous Nightly    linaclotide  145 mcg Oral QAM AC    metoprolol succinate  50 mg Oral BID    pantoprazole  40 mg Oral QAM AC    pravastatin  40 mg Oral Daily    QUEtiapine  50 mg Oral Nightly    tiotropium-olodaterol  2 puff Inhalation Daily    insulin lispro  0-12 Units SubCUTAneous TID WC    insulin lispro  0-6 Units SubCUTAneous Nightly    sodium chloride flush  10 mL IntraVENous 2 times per day    insulin lispro  0.08 Units/kg SubCUTAneous TID WC    cinacalcet  30 mg Oral Q MWF     PRN Meds: heparin (porcine), nitroGLYCERIN, cyclobenzaprine, oxyCODONE-acetaminophen, glucose, dextrose bolus (hypoglycemia), glucagon (rDNA), dextrose, sodium chloride flush, sodium chloride, promethazine **OR** ondansetron, senna, acetaminophen **OR** acetaminophen, magnesium sulfate, albuterol, hydrALAZINE      Intake/Output Summary (Last 24 hours) at 3/18/2022 1343  Last data filed at 3/18/2022 1210  Gross per 24 hour   Intake 645 ml   Output 2700 ml   Net -2055 ml       Physical Exam Performed:    /68   Pulse 78   Temp 98.2 °F (36.8 °C) (Oral)   Resp 18   Ht 5' 9\" (1.753 m)   Wt 247 lb 12.8 oz (112.4 kg)   SpO2 96%   BMI 36.59 kg/m²     General appearance: Obese. No apparent distress, appears stated age and cooperative. Disheveled  HEENT: Pupils equal, round, and reactive to light. Conjunctivae/corneas clear. Neck: Supple, with full range of motion. No jugular venous distention. Trachea midline. Respiratory:  Normal respiratory effort. Clear to auscultation, bilaterally without Rales/Wheezes/Rhonchi. Cardiovascular: Regular rate and rhythm with normal S1/S2 without murmurs, rubs or gallops. Abdomen: Soft, non-tender, non-distended with normal bowel sounds. Musculoskeletal: No clubbing, cyanosis or edema bilaterally. Full range of motion without deformity. Skin: Skin color, texture, turgor normal.  No rashes or lesions. Neurologic:  Neurovascularly intact without any focal sensory/motor deficits.  Cranial nerves: II-XII intact, grossly non-focal.  Psychiatric: Alert and oriented, thought content appropriate, normal insight  Capillary Refill: Brisk,3 seconds, normal   Peripheral Pulses: +2 palpable, equal bilaterally       Labs:   Recent Labs     03/17/22  1258 03/18/22  0657   WBC 11.2* 7.7   HGB 10.7* 10.1*   HCT 32.7* 31.2*    292     Recent Labs     03/17/22  1258 03/17/22 2312 03/18/22  0657   * 130* 132*   K 5.6* 4.8 5.5*   CL 88* 93* 95*   CO2 20* 22 21   BUN 76* 84* 83*   CREATININE 9.8* 10.1* 10.6*   CALCIUM 9.0 8.4 8.2*   PHOS  --  5.3*  --      Recent Labs     03/17/22  1258   AST 12*   ALT 7*   BILITOT 0.3   ALKPHOS 153*     Recent Labs     03/18/22  0815   INR 1.29*     Recent Labs     03/17/22  1258 03/17/22 2033 03/17/22 2312   TROPONINI 0.12* 0.11* 0.11*       Urinalysis:      Lab Results   Component Value Date    NITRU Negative 02/22/2022    WBCUA 3-5 02/22/2022    BACTERIA Rare 02/22/2022    RBCUA 0-2 02/22/2022    BLOODU TRACE-INTACT 02/22/2022    SPECGRAV 1.015 02/22/2022    GLUCOSEU 250 02/22/2022       Radiology:  XR CHEST PORTABLE   Final Result   Limited study. Mild hazy opacity peripherally over the left mid lung is   likely due to rotation and overlying soft tissues. No definite acute process. IR TUNNELED CVC PLACE WO SQ PORT/PUMP > 5 YEARS    (Results Pending)           Assessment/Plan:    Active Hospital Problems    Diagnosis     Acute on chronic combined systolic and diastolic heart failure (HCC) [I50.43]      Priority: High    Type 2 diabetes, uncontrolled, with neuropathy (HCC) [E11.40, E11.65]      Priority: High    Pulmonary edema with diastolic CHF, NYHA class 3 (HCC) [I50.30]     Pulmonary edema [J81.1]     Renovascular hypertension [I15.0]     Neuromuscular disorder (HCC) [G70.9]     ESRD (end stage renal disease) ([address] [N18.6]     Tobacco abuse [Z72.0]     ZAINAB on CPAP [G47.33, Z99.89]     Obesity (BMI 30-39. 9) [E66.9]      Pulmonary edema s/p HD noncompliance/ESRD/Hyperkalemia/Hyponatremia  -Tele, I&O's  -IV Lasix drip initiated at 5 mg/H x15 hours overnight  -Nephrology consulted  -Echo 1/2022 EF 40-45%  -2G Na and fluid restriction dietary modifications in place     Renovascular HTN with combined CHF  -EKG without evidence of acute ischemia  -Continue home medication dosage of Toprol-XL, Pravachol  -Eliquis/plavix held for possible [facility] replacement  -IV hydralazine scheduled PRN (with parameters) to address extreme BP elevation     RAD/COPD/ZAINAB  -Continuous pulse oximetry monitoring initiated with PRN supplemental O2   -Continue home maintenance MDIs/nebulizer treatment including Stiolto Respimat  -Encourage aggressive pulmonary toilet including incentive spirometry every 4H while awake  -Albuterol nebs scheduled 4 times daily during stay  -Patient counseled on necessity of smoking cessation; nicotine replacement patch provided  -RT consulted for home CPAP setting     Uncontrolled IDDM  -A1c 8  -Continue home dosage of Lantus 30 units nightly  -Humalog scheduled AC/HS in addition to PRN SSI coverage  -Carbohydrate restriction placed on diet     Metabolic Encephalopathy with polypharmacy concerns  -Fall risk protocol in place with bed alarm activated  -TSH/ free T4, vitamin D, B12/folate, prealbumin   -Dosages of Percocet, Neurontin, Flexeril, and Seroquel to be limited in use sparingly  -Concern patient continues to require readmission due to inability to care for himself; suspect he would benefit from ECF placement, Palliative care consult per spouse request    PAF/CVA-thalamic cva  -Plavix/Eliquis-held, statin  -Hep gtt    DVT Prophylaxis: Scds  Diet: ADULT DIET; Regular; 4 carb choices (60 gm/meal); Low Sodium (2 gm); Low Potassium (Less than 3000 mg/day);  Low Phosphorus (Less than 1000 mg); 1200 ml  Code Status: Full Code    PT/OT Eval Status: consulted    Dispo - pending clinical improvement ~2-3 days    JAY Ryan - CNP

## 2022-03-19 LAB
ANION GAP SERPL CALCULATED.3IONS-SCNC: 13 MMOL/L (ref 3–16)
APTT: 48.7 SEC (ref 26.2–38.6)
APTT: 48.8 SEC (ref 26.2–38.6)
APTT: 50.8 SEC (ref 26.2–38.6)
BUN BLDV-MCNC: 46 MG/DL (ref 7–20)
CALCIUM SERPL-MCNC: 7.6 MG/DL (ref 8.3–10.6)
CHLORIDE BLD-SCNC: 94 MMOL/L (ref 99–110)
CO2: 24 MMOL/L (ref 21–32)
CREAT SERPL-MCNC: 6.8 MG/DL (ref 0.9–1.3)
GFR AFRICAN AMERICAN: 10
GFR NON-AFRICAN AMERICAN: 9
GLUCOSE BLD-MCNC: 100 MG/DL (ref 70–99)
GLUCOSE BLD-MCNC: 102 MG/DL (ref 70–99)
GLUCOSE BLD-MCNC: 139 MG/DL (ref 70–99)
GLUCOSE BLD-MCNC: 147 MG/DL (ref 70–99)
GLUCOSE BLD-MCNC: 94 MG/DL (ref 70–99)
HCT VFR BLD CALC: 28.1 % (ref 40.5–52.5)
HEMOGLOBIN: 9.1 G/DL (ref 13.5–17.5)
MAGNESIUM: 1.7 MG/DL (ref 1.8–2.4)
MCH RBC QN AUTO: 29.5 PG (ref 26–34)
MCHC RBC AUTO-ENTMCNC: 32.5 G/DL (ref 31–36)
MCV RBC AUTO: 90.6 FL (ref 80–100)
PDW BLD-RTO: 17.5 % (ref 12.4–15.4)
PERFORMED ON: ABNORMAL
PERFORMED ON: NORMAL
PLATELET # BLD: 257 K/UL (ref 135–450)
PMV BLD AUTO: 7.3 FL (ref 5–10.5)
POTASSIUM SERPL-SCNC: 4.3 MMOL/L (ref 3.5–5.1)
RBC # BLD: 3.1 M/UL (ref 4.2–5.9)
SODIUM BLD-SCNC: 131 MMOL/L (ref 136–145)
WBC # BLD: 6.4 K/UL (ref 4–11)

## 2022-03-19 PROCEDURE — 2580000003 HC RX 258: Performed by: HOSPITALIST

## 2022-03-19 PROCEDURE — 6370000000 HC RX 637 (ALT 250 FOR IP): Performed by: HOSPITALIST

## 2022-03-19 PROCEDURE — 94761 N-INVAS EAR/PLS OXIMETRY MLT: CPT

## 2022-03-19 PROCEDURE — 85027 COMPLETE CBC AUTOMATED: CPT

## 2022-03-19 PROCEDURE — 85730 THROMBOPLASTIN TIME PARTIAL: CPT

## 2022-03-19 PROCEDURE — 94660 CPAP INITIATION&MGMT: CPT

## 2022-03-19 PROCEDURE — 1200000000 HC SEMI PRIVATE

## 2022-03-19 PROCEDURE — 90935 HEMODIALYSIS ONE EVALUATION: CPT

## 2022-03-19 PROCEDURE — 2500000003 HC RX 250 WO HCPCS: Performed by: HOSPITALIST

## 2022-03-19 PROCEDURE — 36415 COLL VENOUS BLD VENIPUNCTURE: CPT

## 2022-03-19 PROCEDURE — 83735 ASSAY OF MAGNESIUM: CPT

## 2022-03-19 PROCEDURE — 2700000000 HC OXYGEN THERAPY PER DAY

## 2022-03-19 PROCEDURE — 80048 BASIC METABOLIC PNL TOTAL CA: CPT

## 2022-03-19 PROCEDURE — 94640 AIRWAY INHALATION TREATMENT: CPT

## 2022-03-19 PROCEDURE — 6360000002 HC RX W HCPCS: Performed by: NURSE PRACTITIONER

## 2022-03-19 PROCEDURE — 6360000002 HC RX W HCPCS: Performed by: HOSPITALIST

## 2022-03-19 RX ORDER — HEPARIN SODIUM 1000 [USP'U]/ML
INJECTION, SOLUTION INTRAVENOUS; SUBCUTANEOUS
Status: DISPENSED
Start: 2022-03-19 | End: 2022-03-19

## 2022-03-19 RX ORDER — HEPARIN SODIUM 1000 [USP'U]/ML
2000 INJECTION, SOLUTION INTRAVENOUS; SUBCUTANEOUS ONCE
Status: COMPLETED | OUTPATIENT
Start: 2022-03-19 | End: 2022-03-19

## 2022-03-19 RX ADMIN — Medication 1890 UNITS/HR: at 20:43

## 2022-03-19 RX ADMIN — DOCUSATE SODIUM 100 MG: 100 CAPSULE, LIQUID FILLED ORAL at 13:30

## 2022-03-19 RX ADMIN — HEPARIN SODIUM 2000 UNITS: 1000 INJECTION INTRAVENOUS; SUBCUTANEOUS at 13:24

## 2022-03-19 RX ADMIN — METOPROLOL SUCCINATE 50 MG: 50 TABLET, EXTENDED RELEASE ORAL at 20:40

## 2022-03-19 RX ADMIN — OXYCODONE AND ACETAMINOPHEN 1 TABLET: 5; 325 TABLET ORAL at 13:30

## 2022-03-19 RX ADMIN — GABAPENTIN 100 MG: 100 CAPSULE ORAL at 20:46

## 2022-03-19 RX ADMIN — QUETIAPINE FUMARATE 50 MG: 25 TABLET ORAL at 20:40

## 2022-03-19 RX ADMIN — OXYCODONE AND ACETAMINOPHEN 1 TABLET: 5; 325 TABLET ORAL at 20:53

## 2022-03-19 RX ADMIN — CALCIUM ACETATE 667 MG: 667 CAPSULE ORAL at 17:32

## 2022-03-19 RX ADMIN — GABAPENTIN 100 MG: 100 CAPSULE ORAL at 13:19

## 2022-03-19 RX ADMIN — CALCIUM ACETATE 667 MG: 667 CAPSULE ORAL at 13:29

## 2022-03-19 RX ADMIN — NEPHROCAP 1 MG: 1 CAP ORAL at 13:18

## 2022-03-19 RX ADMIN — PRAVASTATIN SODIUM 40 MG: 40 TABLET ORAL at 13:20

## 2022-03-19 RX ADMIN — TIOTROPIUM BROMIDE AND OLODATEROL 2 PUFF: 3.124; 2.736 SPRAY, METERED RESPIRATORY (INHALATION) at 08:14

## 2022-03-19 RX ADMIN — CYCLOBENZAPRINE 5 MG: 10 TABLET, FILM COATED ORAL at 20:53

## 2022-03-19 RX ADMIN — INSULIN LISPRO 10 UNITS: 100 INJECTION, SOLUTION INTRAVENOUS; SUBCUTANEOUS at 17:32

## 2022-03-19 RX ADMIN — DOCUSATE SODIUM 100 MG: 100 CAPSULE, LIQUID FILLED ORAL at 20:40

## 2022-03-19 RX ADMIN — HEPARIN SODIUM 2000 UNITS: 1000 INJECTION INTRAVENOUS; SUBCUTANEOUS at 05:18

## 2022-03-19 RX ADMIN — HEPARIN SODIUM 2000 UNITS: 1000 INJECTION INTRAVENOUS; SUBCUTANEOUS at 20:44

## 2022-03-19 RX ADMIN — INSULIN GLARGINE 30 UNITS: 100 INJECTION, SOLUTION SUBCUTANEOUS at 20:40

## 2022-03-19 RX ADMIN — METOPROLOL SUCCINATE 50 MG: 50 TABLET, EXTENDED RELEASE ORAL at 13:30

## 2022-03-19 RX ADMIN — SODIUM CHLORIDE, PRESERVATIVE FREE 10 ML: 5 INJECTION INTRAVENOUS at 20:40

## 2022-03-19 RX ADMIN — DULOXETINE 60 MG: 60 CAPSULE, DELAYED RELEASE ORAL at 13:19

## 2022-03-19 ASSESSMENT — PAIN SCALES - GENERAL
PAINLEVEL_OUTOF10: 0
PAINLEVEL_OUTOF10: 0
PAINLEVEL_OUTOF10: 8

## 2022-03-19 ASSESSMENT — PAIN DESCRIPTION - LOCATION: LOCATION: BACK

## 2022-03-19 ASSESSMENT — PAIN DESCRIPTION - PAIN TYPE: TYPE: CHRONIC PAIN

## 2022-03-19 NOTE — PROGRESS NOTES
Treatment time: 3 hrs    Net UF: 2000 ml    Pre weight: 112.8 kg   Post weight: 110.1 kg  EDW: 110 kg    Access used: L TDC  Access function: Fair @ -350 ml/min positional     Medications or blood products given: N/A    Regular outpatient schedule: [address] [place] [address]    Summary of response to treatment: Patient tolerated tx well. No HD related complaints. Access ran positional throughout tx. Patient returned to room. VSS     Copy of dialysis treatment record placed in chart, to be scanned into EMR.

## 2022-03-19 NOTE — PLAN OF CARE
Problem: Pain:  Goal: Control of chronic pain  Description: Control of chronic pain  3/19/2022 1843 by Clive Childs RN  Outcome: Ongoing       Patient's EF (Ejection Fraction) is greater than 40%    Heart Failure Medications:  Diuretics[de-identified] None    (One of the following REQUIRED for EF <40%/SYSTOLIC FAILURE but MAY be used in EF% >40%/DIASTOLIC FAILURE)        ACE[de-identified] None        ARB[de-identified] None         ARNI[de-identified] None    (Beta Blockers)  NON- Evidenced Based Beta Blocker (for EF% >40%/DIASTOLIC FAILURE): None    Evidenced Based Beta Blocker::(REQUIRED for EF% <40%/SYSTOLIC FAILURE) Metoprolol SUCCinate- Toprol XL  . .................................................................................................................................................. Patient's weights and intake/output reviewed: Yes    Patient's Last Weight: 248 lbs obtained by standing scale. Difference of 1 lbs more than last documented weight.       Intake/Output Summary (Last 24 hours) at 3/19/2022 1844  Last data filed at 3/19/2022 1757  Gross per 24 hour   Intake 640 ml   Output 0 ml   Net 640 ml       Comorbidities Reviewed Yes    Patient has a past medical history of Ambulatory dysfunction, Aortic stenosis, Arthritis, Asthma, Bilateral hilar adenopathy syndrome, CAD (coronary artery disease), Cardiomyopathy ([address] CHF (congestive heart failure) ([address] Chronic pain, COPD (chronic obstructive pulmonary disease) ([address] Depression, Diabetes mellitus ([address] Difficult intravenous access, Emphysema of lung ([address] ESRD (end stage renal disease) on dialysis ([address] Fear of needles, Gastric ulcer, GERD (gastroesophageal reflux disease), Heart valve problem, Hemodialysis patient ([facility] History of spinal fracture, Hx of blood clots, Hyperlipidemia, Hypertension, MI (myocardial infarction) ([facility] Neuromuscular disorder ([facility] Numbness and tingling in left arm, Pneumonia, PONV (postoperative nausea and vomiting), Prolonged emergence from general anesthesia, Sleep apnea, Stroke ([facility] [address] TIA (transient ischemic attack), and Unspecified diseases of blood and blood-forming organs. >>For CHF and Comorbidity documentation on Education Time and Topics, please see Education Tab    Progressive Mobility Assessment:  What is this patient's Current Level of Mobility?: Ambulatory- with Assistance  How was this patient Mobilized today?: Edge of Bed,  Up to Toilet/Shower, and Up in Room                 With Whom? Nurse and PCA                 Level of Difficulty/Assistance: 1x Assist     Pt resting in bed at this time on  2 L O2. Pt denies shortness of breath. Pt with pitting lower extremity edema.      Patient and/or Family's stated Goal of Care this Admission: reduce shortness of breath, increase activity tolerance, better understand heart failure and disease management, be more comfortable, and reduce lower extremity edema prior to discharge

## 2022-03-19 NOTE — PROGRESS NOTES
03/18/22 2337   NIV Type   NIV Started/Stopped On   Equipment Type v60   Mode CPAP   Mask Type Full face mask   Settings/Measurements   CPAP/EPAP 12 cmH2O   Resp 19   FiO2  30 %   Vt Exhaled 354 mL   Minute Volume 6.9 Liters   Mask Leak (lpm) 40 lpm  (beard )   Comfort Level Good   Using Accessory Muscles No   SpO2 98   Alarm Settings   Alarms On Y   Press Low Alarm 6 cmH2O   High Pressure Alarm 30 cmH2O   Delay Alarm 20 sec(s)   Resp Rate Low Alarm 6   High Respiratory Rate 40 br/min

## 2022-03-19 NOTE — PROGRESS NOTES
03/19/22 0435   NIV Type   Skin Protection for O2 Device Yes   Location Nose   NIV Started/Stopped On   Equipment Type v60   Mode CPAP   Mask Type Full face mask   Settings/Measurements   CPAP/EPAP 12 cmH2O   Resp 17   FiO2  30 %   Vt Exhaled 354 mL   Minute Volume 5.8 Liters   Mask Leak (lpm) 65 lpm  (beard )   Comfort Level Good   Using Accessory Muscles No   SpO2 98   Alarm Settings   Alarms On Y   Press Low Alarm 6 cmH2O   High Pressure Alarm 30 cmH2O   Delay Alarm 20 sec(s)   Resp Rate Low Alarm 6   High Respiratory Rate 40 br/min

## 2022-03-19 NOTE — PROGRESS NOTES
Nephrology Progress Note   [facility]. [facility]      This patient is a 46year old male whom we are following for ESKD. Subjective: The patient was seen and examined during dialysis; he feels well today with no CP, SOB, nausea or vomiting. ROS: No fever or chills. Social: No family at bedside. Vitals:  /72   Pulse 65   Temp 98.2 °F (36.8 °C) (Axillary)   Resp 15   Ht 5' 9\" (1.753 m)   Wt 248 lb 11.2 oz (112.8 kg)   SpO2 99%   BMI 36.73 kg/m²   I/O last 3 completed shifts: In: 2607 [P.O.:890; I.V.:5]  Out: 2700 [Urine:300]  No intake/output data recorded. Physical Exam:  Physical Exam  Vitals reviewed. Constitutional:       Appearance: Normal appearance. HENT:      Head: Normocephalic and atraumatic. Eyes:      General: No scleral icterus. Conjunctiva/sclera: Conjunctivae normal.   Cardiovascular:      Rate and Rhythm: Normal rate. Heart sounds: No friction rub. Pulmonary:      Effort: Pulmonary effort is normal. No respiratory distress. Abdominal:      General: Bowel sounds are normal. There is no distension. Tenderness: There is no abdominal tenderness. Musculoskeletal:      Right lower leg: No edema. Left lower leg: No edema. Neurological:      Mental Status: He is alert.        Access: [facility] TDC      Medications:   heparin (porcine)  3,500 Units IntraVENous Q MWF    doxercalciferol  15 mcg IntraVENous Q MWF    epoetin siddharth-epbx  8,000 Units IntraVENous Q MWF    lidocaine  1 patch TransDERmal Daily    [Held by provider] apixaban  5 mg Oral BID    b complex-C-folic acid  1 capsule Oral Daily    calcium acetate  1 capsule Oral TID WC    [Held by provider] clopidogrel  75 mg Oral Daily    docusate sodium  100 mg Oral BID    DULoxetine  60 mg Oral Daily    fluticasone  1 spray Each Nostril Daily    gabapentin  100 mg Oral TID    insulin glargine  30 Units SubCUTAneous Nightly    linaclotide  145 mcg Oral QAM AC    metoprolol succinate  50 mg Oral BID    pantoprazole  40 mg Oral QAM AC    pravastatin  40 mg Oral Daily    QUEtiapine  50 mg Oral Nightly    tiotropium-olodaterol  2 puff Inhalation Daily    insulin lispro  0-12 Units SubCUTAneous TID WC    insulin lispro  0-6 Units SubCUTAneous Nightly    sodium chloride flush  10 mL IntraVENous 2 times per day    insulin lispro  0.08 Units/kg SubCUTAneous TID WC    cinacalcet  30 mg Oral Q MWF         Labs:  Recent Labs     03/17/22  1258 03/18/22  0657 03/19/22  0334   WBC 11.2* 7.7 6.4   HGB 10.7* 10.1* 9.1*   HCT 32.7* 31.2* 28.1*   MCV 88.5 90.7 90.6    292 257     Recent Labs     03/17/22  2312 03/18/22  0657 03/19/22  0334   * 132* 131*   K 4.8 5.5* 4.3   CL 93* 95* 94*   CO2 22 21 24   GLUCOSE 234* 127* 100*   PHOS 5.3*  --   --    MG  --  2.00 1.70*   BUN 84* 83* 46*   CREATININE 10.1* 10.6* 6.8*   LABGLOM 5* 5* 9*   GFRAA 7* 6* 10*           Assessment/Plan:    ESKD. - On HD [address] - HD today then again on Monday     Non-functioning [facility] TDC.  - Plan for [facility] replacement on Monday by IR.     Hyperkalemia.  - From missed dialysis. - Received Lokelma 10g x 3 doses.     Hypervolemic Hyponatremia. - UF with HD.  - Fluid restriction.     Hypertension.  - On Metoprolol.     CKD-MBD.  - Continue calcium acetate, Hectorol and Cinacalcet.     Anemia.  - On EPO qHD.

## 2022-03-19 NOTE — PLAN OF CARE
Problem: Pain:  Goal: Pain level will decrease  Description: Pain level will decrease  Outcome: Ongoing     Problem: Skin Integrity:  Goal: Will show no infection signs and symptoms  Description: Will show no infection signs and symptoms  Outcome: Ongoing     Problem: Nutrition  Goal: Optimal nutrition therapy  Outcome: Ongoing

## 2022-03-19 NOTE — PROGRESS NOTES
03/18/22 2126   NIV Type   Skin Protection for O2 Device Yes   Location Nose   NIV Started/Stopped On   Equipment Type v60   Mode CPAP   Mask Type Full face mask   Settings/Measurements   CPAP/EPAP 12 cmH2O   Resp 19   FiO2  30 %   Vt Exhaled 648 mL   Minute Volume 12.3 Liters   Comfort Level Good   Using Accessory Muscles No   SpO2 98   Alarm Settings   Alarms On Y   Press Low Alarm 6 cmH2O   High Pressure Alarm 30 cmH2O   Delay Alarm 20 sec(s)   Resp Rate Low Alarm 6   High Respiratory Rate 40 br/min

## 2022-03-19 NOTE — PROGRESS NOTES
Hospitalist Progress Note      PCP: Tara Parmar MD    Date of Admission: 3/17/2022    Chief Complaint: SOB    Hospital Course: Kathy Olivia is a 46 y.o. male who presented to the ED where for acute worsening dyspnea s/p skipping HD session yesterday. Review of epic chart reveals the patient is hospitalized almost twice every month related to his inability to appropriately care for himself. The patient has multiple comorbidities including: ESRD on HD, COPD/ZAINAB, IDDM, CVA with residual deficit, and CHF with renovascular HTN. Patient is  again altered upon arrival, with concerns for polypharmacy (which has been expressed by several providers at different encounters).    Upon arrival to the ED patient was markedly hypertensive 214/105. Stat EKG revealed sinus tachycardia without evidence of acute ischemia. Notable labs include: Hyponatremia 127, hyperkalemia 5.6, BUN/CR 76/9.8 glucose 137, BNP 55,392, and anemia H/H 10.7/22.7. ED provider contacted [facility] to discuss need for urgent hemodialysis and correction of current HD access site. Subjective: NAD. No voiced complaints.  HD today      Medications:  Reviewed    Infusion Medications    heparin (PORCINE) Infusion 1,550 Units/hr (03/19/22 0519)    dextrose      sodium chloride       Scheduled Medications    heparin (porcine)  3,500 Units IntraVENous Q MWF    doxercalciferol  15 mcg IntraVENous Q MWF    epoetin siddharth-epbx  8,000 Units IntraVENous Q MWF    lidocaine  1 patch TransDERmal Daily    [Held by provider] apixaban  5 mg Oral BID    b complex-C-folic acid  1 capsule Oral Daily    calcium acetate  1 capsule Oral TID WC    [Held by provider] clopidogrel  75 mg Oral Daily    docusate sodium  100 mg Oral BID    DULoxetine  60 mg Oral Daily    fluticasone  1 spray Each Nostril Daily    gabapentin  100 mg Oral TID    insulin glargine  30 Units SubCUTAneous Nightly    linaclotide  145 mcg Oral QAM AC    metoprolol succinate  50 mg Oral BID    pantoprazole  40 mg Oral QAM AC    pravastatin  40 mg Oral Daily    QUEtiapine  50 mg Oral Nightly    tiotropium-olodaterol  2 puff Inhalation Daily    insulin lispro  0-12 Units SubCUTAneous TID WC    insulin lispro  0-6 Units SubCUTAneous Nightly    sodium chloride flush  10 mL IntraVENous 2 times per day    insulin lispro  0.08 Units/kg SubCUTAneous TID WC    cinacalcet  30 mg Oral Q MWF     PRN Meds: heparin (porcine), heparin (porcine), heparin (porcine), nitroGLYCERIN, cyclobenzaprine, oxyCODONE-acetaminophen, glucose, dextrose bolus (hypoglycemia), glucagon (rDNA), dextrose, sodium chloride flush, sodium chloride, promethazine **OR** ondansetron, senna, acetaminophen **OR** acetaminophen, magnesium sulfate, albuterol, hydrALAZINE      Intake/Output Summary (Last 24 hours) at 3/19/2022 0735  Last data filed at 3/19/2022 0624  Gross per 24 hour   Intake 1050 ml   Output 2400 ml   Net -1350 ml       Physical Exam Performed:    BP (!) 134/58   Pulse 70   Temp 98.2 °F (36.8 °C) (Axillary)   Resp 17   Ht 5' 9\" (1.753 m)   Wt 248 lb 11.2 oz (112.8 kg)   SpO2 98%   BMI 36.73 kg/m²     General appearance: Obese. No apparent distress, appears stated age and cooperative. Disheveled  HEENT: Pupils equal, round, and reactive to light. Conjunctivae/corneas clear. Neck: Supple, with full range of motion. No jugular venous distention. Trachea midline. Respiratory:  Normal respiratory effort. Clear to auscultation, bilaterally without Rales/Wheezes/Rhonchi. Cardiovascular: Regular rate and rhythm with normal S1/S2 without murmurs, rubs or gallops. Abdomen: Soft, non-tender, non-distended with normal bowel sounds. Musculoskeletal: No clubbing, cyanosis or edema bilaterally. Full range of motion without deformity. Skin: Skin color-mild bilateral anterior LE erythema  Neurologic:  Neurovascularly intact without any focal sensory/motor deficits.  Cranial nerves: II-XII intact, grossly non-focal.  Psychiatric: Alert and oriented, thought content appropriate, normal insight  Capillary Refill: Brisk,3 seconds, normal   Peripheral Pulses: +2 palpable, equal bilaterally       Labs:   Recent Labs     03/17/22  1258 03/18/22  0657 03/19/22  0334   WBC 11.2* 7.7 6.4   HGB 10.7* 10.1* 9.1*   HCT 32.7* 31.2* 28.1*    292 257     Recent Labs     03/17/22  2312 03/18/22  0657 03/19/22  0334   * 132* 131*   K 4.8 5.5* 4.3   CL 93* 95* 94*   CO2 22 21 24   BUN 84* 83* 46*   CREATININE 10.1* 10.6* 6.8*   CALCIUM 8.4 8.2* 7.6*   PHOS 5.3*  --   --      Recent Labs     03/17/22  1258   AST 12*   ALT 7*   BILITOT 0.3   ALKPHOS 153*     Recent Labs     03/18/22  0815   INR 1.29*     Recent Labs     03/17/22  1258 03/17/22 2033 03/17/22 2312   TROPONINI 0.12* 0.11* 0.11*       Urinalysis:      Lab Results   Component Value Date    NITRU Negative 03/18/2022    WBCUA  03/18/2022    BACTERIA 1+ 03/18/2022    RBCUA 3-4 03/18/2022    BLOODU TRACE-INTACT 03/18/2022    SPECGRAV 1.020 03/18/2022    GLUCOSEU 100 03/18/2022       Radiology:  XR CHEST PORTABLE   Final Result   Limited study. Mild hazy opacity peripherally over the left mid lung is   likely due to rotation and overlying soft tissues. No definite acute process. IR TUNNELED CVC PLACE WO SQ PORT/PUMP > 5 YEARS    (Results Pending)           Assessment/Plan:    Active Hospital Problems    Diagnosis     Acute on chronic combined systolic and diastolic heart failure (HCC) [I50.43]      Priority: High    Type 2 diabetes, uncontrolled, with neuropathy (HCC) [E11.40, E11.65]      Priority: High    Pulmonary edema with diastolic CHF, NYHA class 3 (HCC) [I50.30]     Pulmonary edema [J81.1]     Renovascular hypertension [I15.0]     Neuromuscular disorder (HCC) [G70.9]     ESRD (end stage renal disease) ([facility] [N18.6]     Tobacco abuse [Z72.0]     ZAINAB on CPAP [G47.33, Z99.89]     Obesity (BMI 30-39. 9) [E66.9]      Pulmonary edema s/p HD noncompliance/ESRD/Hyperkalemia/Hyponatremia  -Tele, I&O's  -Lasix drip d/c'd  -Nephrology consulted  -Echo 1/2022 EF 40-45%  -2G Na and fluid restriction dietary modifications in place     Renovascular HTN with combined CHF  -EKG without evidence of acute ischemia  -Continue home medication dosage of Toprol-XL, Pravachol  -Eliquis/plavix held for possible [facility] [place] replacement  -IV hydralazine scheduled PRN (with parameters) to address extreme BP elevation     RAD/COPD/ZAINAB  -Continuous pulse oximetry monitoring initiated with PRN supplemental O2   -Continue home maintenance MDIs/nebulizer treatment including Stiolto Respimat  -Encourage aggressive pulmonary toilet including incentive spirometry every 4H while awake  -Albuterol nebs scheduled 4 times daily during stay  -Patient counseled on necessity of smoking cessation; nicotine replacement patch provided  -RT consulted for home CPAP setting     Uncontrolled IDDM  -A1c 8  -Continue home dosage of Lantus 30 units nightly  -Humalog scheduled AC/HS in addition to PRN SSI coverage  -Carbohydrate restriction placed on diet     Metabolic Encephalopathy with polypharmacy concerns  -Fall risk protocol in place with bed alarm activated  -TSH/ free T4, vitamin D, B12/folate, prealbumin   -Dosages of Percocet, Neurontin, Flexeril, and Seroquel to be limited in use sparingly  -Concern patient continues to require readmission due to inability to care for himself; suspect he would benefit from ECF placement, Palliative care consult per spouse request    PAF/CVA-thalamic cva  -Plavix/Eliquis-held, statin  -Hep gtt-will dc if HD cath functioning appropriately    DVT Prophylaxis: Scds/Hep gtt  Diet: ADULT DIET; Regular; 4 carb choices (60 gm/meal); Low Sodium (2 gm); Low Potassium (Less than 3000 mg/day);  Low Phosphorus (Less than 1000 mg); 1200 ml  Code Status: Full Code    PT/OT Eval Status: consulted    Dispo - pending clinical improvement ~2-3 days    Emily Rossi, APRN - CNP

## 2022-03-20 LAB
ANION GAP SERPL CALCULATED.3IONS-SCNC: 15 MMOL/L (ref 3–16)
APTT: 55.2 SEC (ref 26.2–38.6)
APTT: 73.6 SEC (ref 26.2–38.6)
APTT: 75 SEC (ref 26.2–38.6)
BUN BLDV-MCNC: 33 MG/DL (ref 7–20)
CALCIUM SERPL-MCNC: 8 MG/DL (ref 8.3–10.6)
CHLORIDE BLD-SCNC: 95 MMOL/L (ref 99–110)
CO2: 23 MMOL/L (ref 21–32)
CREAT SERPL-MCNC: 5.6 MG/DL (ref 0.9–1.3)
GFR AFRICAN AMERICAN: 13
GFR NON-AFRICAN AMERICAN: 11
GLUCOSE BLD-MCNC: 121 MG/DL (ref 70–99)
GLUCOSE BLD-MCNC: 123 MG/DL (ref 70–99)
GLUCOSE BLD-MCNC: 136 MG/DL (ref 70–99)
GLUCOSE BLD-MCNC: 137 MG/DL (ref 70–99)
GLUCOSE BLD-MCNC: 180 MG/DL (ref 70–99)
MAGNESIUM: 1.8 MG/DL (ref 1.8–2.4)
PERFORMED ON: ABNORMAL
POTASSIUM SERPL-SCNC: 4.5 MMOL/L (ref 3.5–5.1)
PRO-BNP: ABNORMAL PG/ML (ref 0–124)
SODIUM BLD-SCNC: 133 MMOL/L (ref 136–145)

## 2022-03-20 PROCEDURE — 6360000002 HC RX W HCPCS: Performed by: HOSPITALIST

## 2022-03-20 PROCEDURE — 83735 ASSAY OF MAGNESIUM: CPT

## 2022-03-20 PROCEDURE — 2500000003 HC RX 250 WO HCPCS: Performed by: HOSPITALIST

## 2022-03-20 PROCEDURE — 6360000002 HC RX W HCPCS: Performed by: NURSE PRACTITIONER

## 2022-03-20 PROCEDURE — 94761 N-INVAS EAR/PLS OXIMETRY MLT: CPT

## 2022-03-20 PROCEDURE — 2580000003 HC RX 258: Performed by: HOSPITALIST

## 2022-03-20 PROCEDURE — 1200000000 HC SEMI PRIVATE

## 2022-03-20 PROCEDURE — 80048 BASIC METABOLIC PNL TOTAL CA: CPT

## 2022-03-20 PROCEDURE — 94640 AIRWAY INHALATION TREATMENT: CPT

## 2022-03-20 PROCEDURE — 6370000000 HC RX 637 (ALT 250 FOR IP): Performed by: HOSPITALIST

## 2022-03-20 PROCEDURE — 83880 ASSAY OF NATRIURETIC PEPTIDE: CPT

## 2022-03-20 PROCEDURE — 2700000000 HC OXYGEN THERAPY PER DAY

## 2022-03-20 PROCEDURE — 36415 COLL VENOUS BLD VENIPUNCTURE: CPT

## 2022-03-20 PROCEDURE — 85730 THROMBOPLASTIN TIME PARTIAL: CPT

## 2022-03-20 RX ADMIN — TIOTROPIUM BROMIDE AND OLODATEROL 2 PUFF: 3.124; 2.736 SPRAY, METERED RESPIRATORY (INHALATION) at 07:51

## 2022-03-20 RX ADMIN — QUETIAPINE FUMARATE 50 MG: 25 TABLET ORAL at 21:02

## 2022-03-20 RX ADMIN — OXYCODONE AND ACETAMINOPHEN 1 TABLET: 5; 325 TABLET ORAL at 09:33

## 2022-03-20 RX ADMIN — DOCUSATE SODIUM 100 MG: 100 CAPSULE, LIQUID FILLED ORAL at 21:02

## 2022-03-20 RX ADMIN — PANTOPRAZOLE SODIUM 40 MG: 40 TABLET, DELAYED RELEASE ORAL at 06:49

## 2022-03-20 RX ADMIN — DULOXETINE 60 MG: 60 CAPSULE, DELAYED RELEASE ORAL at 09:30

## 2022-03-20 RX ADMIN — GABAPENTIN 100 MG: 100 CAPSULE ORAL at 09:30

## 2022-03-20 RX ADMIN — NEPHROCAP 1 MG: 1 CAP ORAL at 09:30

## 2022-03-20 RX ADMIN — HEPARIN SODIUM 2000 UNITS: 1000 INJECTION INTRAVENOUS; SUBCUTANEOUS at 12:28

## 2022-03-20 RX ADMIN — CALCIUM ACETATE 667 MG: 667 CAPSULE ORAL at 12:13

## 2022-03-20 RX ADMIN — PRAVASTATIN SODIUM 40 MG: 40 TABLET ORAL at 09:30

## 2022-03-20 RX ADMIN — GABAPENTIN 100 MG: 100 CAPSULE ORAL at 21:02

## 2022-03-20 RX ADMIN — CALCIUM ACETATE 667 MG: 667 CAPSULE ORAL at 17:31

## 2022-03-20 RX ADMIN — SODIUM CHLORIDE, PRESERVATIVE FREE 10 ML: 5 INJECTION INTRAVENOUS at 21:09

## 2022-03-20 RX ADMIN — Medication 1890 UNITS/HR: at 10:17

## 2022-03-20 RX ADMIN — INSULIN GLARGINE 30 UNITS: 100 INJECTION, SOLUTION SUBCUTANEOUS at 21:03

## 2022-03-20 RX ADMIN — GABAPENTIN 100 MG: 100 CAPSULE ORAL at 14:20

## 2022-03-20 RX ADMIN — CALCIUM ACETATE 667 MG: 667 CAPSULE ORAL at 09:30

## 2022-03-20 RX ADMIN — INSULIN LISPRO 1 UNITS: 100 INJECTION, SOLUTION INTRAVENOUS; SUBCUTANEOUS at 21:03

## 2022-03-20 RX ADMIN — METOPROLOL SUCCINATE 50 MG: 50 TABLET, EXTENDED RELEASE ORAL at 21:02

## 2022-03-20 RX ADMIN — METOPROLOL SUCCINATE 50 MG: 50 TABLET, EXTENDED RELEASE ORAL at 09:30

## 2022-03-20 RX ADMIN — DOCUSATE SODIUM 100 MG: 100 CAPSULE, LIQUID FILLED ORAL at 09:30

## 2022-03-20 ASSESSMENT — PAIN SCALES - GENERAL
PAINLEVEL_OUTOF10: 8
PAINLEVEL_OUTOF10: 4
PAINLEVEL_OUTOF10: 0
PAINLEVEL_OUTOF10: 0

## 2022-03-20 NOTE — PLAN OF CARE
Problem: Skin Integrity:  Goal: Will show no infection signs and symptoms  Description: Will show no infection signs and symptoms  Outcome: Met This Shift     Problem: OXYGENATION/RESPIRATORY FUNCTION  Goal: Patient will maintain patent airway  Outcome: Met This Shift  Goal: Patient will achieve/maintain normal respiratory rate/effort  Description: Respiratory rate and effort will be within normal limits for the patient  Outcome: Met This Shift     Problem: HEMODYNAMIC STATUS  Goal: Patient has stable vital signs and fluid balance  Outcome: Met This Shift     Problem: ACTIVITY INTOLERANCE/IMPAIRED MOBILITY  Goal: Mobility/activity is maintained at optimum level for patient  Outcome: Met This Shift     Problem: Pain:  Goal: Pain level will decrease  Description: Pain level will decrease  Outcome: Ongoing     Problem: FLUID AND ELECTROLYTE IMBALANCE  Goal: Fluid and electrolyte balance are achieved/maintained  Outcome: Ongoing       Patient's EF (Ejection Fraction) is greater than 40%    Heart Failure Medications:  Diuretics[de-identified] None    (One of the following REQUIRED for EF </= 40%/SYSTOLIC FAILURE but MAY be used in EF% >40%/DIASTOLIC FAILURE)        ACE[de-identified] None        ARB[de-identified] None         ARNI[de-identified] None    (Beta Blockers)  NON- Evidenced Based Beta Blocker (for EF% >40%/DIASTOLIC FAILURE): None    Evidenced Based Beta Blocker::(REQUIRED for EF% <40%/SYSTOLIC FAILURE) Metoprolol SUCCinate- Toprol XL  . .................................................................................................................................................. Patient's weights and intake/output reviewed: Yes    Patient's Last Weight: 248 lbs obtained by standing scale. Difference of 1 lbs more than last documented weight.       Intake/Output Summary (Last 24 hours) at 3/20/2022 0041  Last data filed at 3/19/2022 2054  Gross per 24 hour   Intake 700 ml   Output 0 ml   Net 700 ml       Comorbidities Reviewed Yes    Patient has a past medical history of Ambulatory dysfunction, Aortic stenosis, Arthritis, Asthma, Bilateral hilar adenopathy syndrome, CAD (coronary artery disease), Cardiomyopathy ([address] CHF (congestive heart failure) ([address] Chronic pain, COPD (chronic obstructive pulmonary disease) ([address] Depression, Diabetes mellitus ([address] Difficult intravenous access, Emphysema of lung ([address] ESRD (end stage renal disease) on dialysis ([address] Fear of needles, Gastric ulcer, GERD (gastroesophageal reflux disease), Heart valve problem, Hemodialysis patient ([address] History of spinal fracture, Hx of blood clots, Hyperlipidemia, Hypertension, MI (myocardial infarction) ([address] Neuromuscular disorder ([address] Numbness and tingling in left arm, Pneumonia, PONV (postoperative nausea and vomiting), Prolonged emergence from general anesthesia, Sleep apnea, Stroke ([address] TIA (transient ischemic attack), and Unspecified diseases of blood and blood-forming organs. >>For CHF and Comorbidity documentation on Education Time and Topics, please see Education Tab    Progressive Mobility Assessment:  What is this patient's Current Level of Mobility?: Ambulatory- with Assistance  How was this patient Mobilized today?: Edge of Bed, Bedside Commode,  Up to Toilet/Shower, and Up in Room, ambulated 10 ft                 With Whom? Nurse, PCA, and Self                 Level of Difficulty/Assistance: 1x Assist     Pt resting in bed at this time on  2 L O2. Pt denies shortness of breath. Pt with pitting lower extremity edema.      Patient and/or Family's stated Goal of Care this Admission: reduce shortness of breath, increase activity tolerance, better understand heart failure and disease management, be more comfortable, and reduce lower extremity edema prior to discharge        :

## 2022-03-20 NOTE — PROGRESS NOTES
Nephrology Progress Note   [facility]      This patient is a 46year old male whom we are following for ESKD. Subjective: The patient was seen and examined; he feels well today with no CP, SOB, nausea or vomiting. ROS: No fever or chills. Social: No family at bedside. Vitals:  BP (!) 136/104   Pulse 64   Temp 97.7 °F (36.5 °C) (Oral)   Resp 16   Ht 5' 9\" (1.753 m)   Wt 248 lb 11.2 oz (112.8 kg)   SpO2 99%   BMI 36.73 kg/m²   I/O last 3 completed shifts: In: 1100 [P.O.:1100]  Out: 0   I/O this shift:  In: 120 [P.O.:120]  Out: -     Physical Exam:  Physical Exam  Vitals reviewed. Constitutional:       Appearance: Normal appearance. HENT:      Head: Normocephalic and atraumatic. Eyes:      General: No scleral icterus. Conjunctiva/sclera: Conjunctivae normal.   Cardiovascular:      Rate and Rhythm: Normal rate. Heart sounds: No friction rub. Pulmonary:      Effort: Pulmonary effort is normal. No respiratory distress. Abdominal:      General: Bowel sounds are normal. There is no distension. Tenderness: There is no abdominal tenderness. Musculoskeletal:      Right lower leg: No edema. Left lower leg: No edema. Neurological:      Mental Status: He is alert.        Access: [facility] TDC      Medications:   heparin (porcine)  3,500 Units IntraVENous Q MWF    doxercalciferol  15 mcg IntraVENous Q MWF    epoetin siddharth-epbx  8,000 Units IntraVENous Q MWF    lidocaine  1 patch TransDERmal Daily    [Held by provider] apixaban  5 mg Oral BID    b complex-C-folic acid  1 capsule Oral Daily    calcium acetate  1 capsule Oral TID WC    [Held by provider] clopidogrel  75 mg Oral Daily    docusate sodium  100 mg Oral BID    DULoxetine  60 mg Oral Daily    fluticasone  1 spray Each Nostril Daily    gabapentin  100 mg Oral TID    insulin glargine  30 Units SubCUTAneous Nightly    linaclotide  145 mcg Oral QAM AC    metoprolol succinate  50 mg Oral BID    pantoprazole  40 mg Oral QAM AC    pravastatin  40 mg Oral Daily    QUEtiapine  50 mg Oral Nightly    tiotropium-olodaterol  2 puff Inhalation Daily    insulin lispro  0-12 Units SubCUTAneous TID WC    insulin lispro  0-6 Units SubCUTAneous Nightly    sodium chloride flush  10 mL IntraVENous 2 times per day    insulin lispro  0.08 Units/kg SubCUTAneous TID          Labs:  Recent Labs     03/17/22  1258 03/18/22  0657 03/19/22  0334   WBC 11.2* 7.7 6.4   HGB 10.7* 10.1* 9.1*   HCT 32.7* 31.2* 28.1*   MCV 88.5 90.7 90.6    292 257     Recent Labs     03/17/22  2312 03/17/22  2312 03/18/22  0657 03/19/22  0334 03/20/22  0824   *   < > 132* 131* 133*   K 4.8   < > 5.5* 4.3 4.5   CL 93*   < > 95* 94* 95*   CO2 22   < > 21 24 23   GLUCOSE 234*   < > 127* 100* 123*   PHOS 5.3*  --   --   --   --    MG  --   --  2.00 1.70* 1.80   BUN 84*   < > 83* 46* 33*   CREATININE 10.1*   < > 10.6* 6.8* 5.6*   LABGLOM 5*   < > 5* 9* 11*   GFRAA 7*   < > 6* 10* 13*    < > = values in this interval not displayed. Assessment/Plan:    ESKD. - On HD [address]     Non-functioning [facility] replacement on Monday by IR.     Hyperkalemia.  - From missed dialysis. - Received Lokelma 10g x 3 doses.     Hypervolemic Hyponatremia. - UF with HD.  - Fluid restriction.     Hypertension.  - On Metoprolol.     CKD-MBD.  - Continue calcium acetate, Hectorol and Cinacalcet.     Anemia.  - On EPO qHD.

## 2022-03-20 NOTE — PROGRESS NOTES
Hospitalist Progress Note      PCP: Darcie Millan MD    Date of Admission: 3/17/2022    Chief Complaint: SOB    Hospital Course: Geraldo Ervin is a 46 y.o. male who presented to the ED where for acute worsening dyspnea s/p skipping HD session yesterday. Review of epic chart reveals the patient is hospitalized almost twice every month related to his inability to appropriately care for himself. The patient has multiple comorbidities including: ESRD on HD, COPD/ZAINAB, IDDM, CVA with residual deficit, and CHF with renovascular HTN. Patient is  again altered upon arrival, with concerns for polypharmacy (which has been expressed by several providers at different encounters).    Upon arrival to the ED patient was markedly hypertensive 214/105. Stat EKG revealed sinus tachycardia without evidence of acute ischemia. Notable labs include: Hyponatremia 127, hyperkalemia 5.6, BUN/CR 76/9.8 glucose 137, BNP 55,392, and anemia H/H 10.7/22.7. ED provider contacted [facility] to discuss need for urgent hemodialysis and correction of current HD access site. Subjective: NAD. No voiced complaints. Updated on plan of care.        Medications:  Reviewed    Infusion Medications    heparin (PORCINE) Infusion 2,060 Units/hr (03/20/22 1229)    dextrose      sodium chloride       Scheduled Medications    doxercalciferol  15 mcg IntraVENous Q MWF    epoetin siddharth-epbx  8,000 Units IntraVENous Q MWF    lidocaine  1 patch TransDERmal Daily    [Held by provider] apixaban  5 mg Oral BID    b complex-C-folic acid  1 capsule Oral Daily    calcium acetate  1 capsule Oral TID WC    [Held by provider] clopidogrel  75 mg Oral Daily    docusate sodium  100 mg Oral BID    DULoxetine  60 mg Oral Daily    fluticasone  1 spray Each Nostril Daily    gabapentin  100 mg Oral TID    insulin glargine  30 Units SubCUTAneous Nightly    linaclotide  145 mcg Oral QAM AC    metoprolol succinate  50 mg Oral BID    pantoprazole  40 mg Oral QAM AC    pravastatin  40 mg Oral Daily    QUEtiapine  50 mg Oral Nightly    tiotropium-olodaterol  2 puff Inhalation Daily    insulin lispro  0-12 Units SubCUTAneous TID WC    insulin lispro  0-6 Units SubCUTAneous Nightly    sodium chloride flush  10 mL IntraVENous 2 times per day    insulin lispro  0.08 Units/kg SubCUTAneous TID      PRN Meds: heparin (porcine), heparin (porcine), nitroGLYCERIN, cyclobenzaprine, oxyCODONE-acetaminophen, glucose, dextrose bolus (hypoglycemia), glucagon (rDNA), dextrose, sodium chloride flush, sodium chloride, promethazine **OR** ondansetron, senna, acetaminophen **OR** acetaminophen, magnesium sulfate, albuterol, hydrALAZINE      Intake/Output Summary (Last 24 hours) at 3/20/2022 1435  Last data filed at 3/20/2022 0847  Gross per 24 hour   Intake 580 ml   Output 0 ml   Net 580 ml       Physical Exam Performed:    /62   Pulse 67   Temp 97.7 °F (36.5 °C) (Axillary)   Resp 16   Ht 5' 9\" (1.753 m)   Wt 248 lb 11.2 oz (112.8 kg)   SpO2 97%   BMI 36.73 kg/m²     General appearance: Obese. No apparent distress, appears stated age and cooperative. Disheveled  HEENT: Pupils equal, round, and reactive to light. Conjunctivae/corneas clear. Neck: Supple, with full range of motion. No jugular venous distention. Trachea midline. Respiratory:  Normal respiratory effort. Clear to auscultation, bilaterally without Rales/Wheezes/Rhonchi. Cardiovascular: Regular rate and rhythm with normal S1/S2 without murmurs, rubs or gallops. Abdomen: Soft, non-tender, non-distended with normal bowel sounds. Musculoskeletal: No clubbing, cyanosis or edema bilaterally. Full range of motion without deformity. Skin: Skin color-mild bilateral anterior LE erythema  Neurologic:  Neurovascularly intact without any focal sensory/motor deficits.  Cranial nerves: II-XII intact, grossly non-focal.  Psychiatric: Alert and oriented, thought content appropriate, normal insight  Capillary Refill: Brisk,3 seconds, normal   Peripheral Pulses: +2 palpable, equal bilaterally       Labs:   Recent Labs     03/18/22  0657 03/19/22  0334   WBC 7.7 6.4   HGB 10.1* 9.1*   HCT 31.2* 28.1*    257     Recent Labs     03/17/22  2312 03/17/22  2312 03/18/22  0657 03/19/22  0334 03/20/22  0824   *   < > 132* 131* 133*   K 4.8   < > 5.5* 4.3 4.5   CL 93*   < > 95* 94* 95*   CO2 22   < > 21 24 23   BUN 84*   < > 83* 46* 33*   CREATININE 10.1*   < > 10.6* 6.8* 5.6*   CALCIUM 8.4   < > 8.2* 7.6* 8.0*   PHOS 5.3*  --   --   --   --     < > = values in this interval not displayed. No results for input(s): AST, ALT, BILIDIR, BILITOT, ALKPHOS in the last 72 hours. Recent Labs     03/18/22  0815   INR 1.29*     Recent Labs     03/17/22 2033 03/17/22 2312   TROPONINI 0.11* 0.11*       Urinalysis:      Lab Results   Component Value Date    NITRU Negative 03/18/2022    WBCUA  03/18/2022    BACTERIA 1+ 03/18/2022    RBCUA 3-4 03/18/2022    BLOODU TRACE-INTACT 03/18/2022    SPECGRAV 1.020 03/18/2022    GLUCOSEU 100 03/18/2022       Radiology:  XR CHEST PORTABLE   Final Result   Limited study. Mild hazy opacity peripherally over the left mid lung is   likely due to rotation and overlying soft tissues. No definite acute process. IR TUNNELED CVC PLACE WO SQ PORT/PUMP > 5 YEARS    (Results Pending)           Assessment/Plan:    Active Hospital Problems    Diagnosis     Acute on chronic combined systolic and diastolic heart failure (HCC) [I50.43]      Priority: High    Type 2 diabetes, uncontrolled, with neuropathy (HCC) [E11.40, E11.65]      Priority: High    Pulmonary edema with diastolic CHF, NYHA class 3 (HCC) [I50.30]     Pulmonary edema [J81.1]     Renovascular hypertension [I15.0]     Neuromuscular disorder (HCC) [G70.9]     ESRD (end stage renal disease) ([facility] [address] [N18.6]     Tobacco abuse [Z72.0]     ZAINAB on CPAP [G47.33, Z99.89]     Obesity (BMI 30-39. 9) [E66.9]      Pulmonary edema s/p HD noncompliance/ESRD/Hyperkalemia/Hyponatremia  -Tele, I&O's  -Lasix drip d/c'd  -Nephrology consulted  -Echo 1/2022 EF 40-45%  -2G Na and fluid restriction dietary modifications in place  -Possible TDC replacement on Monday by IR     Renovascular HTN with combined CHF  -EKG without evidence of acute ischemia  -Continue home medication dosage of Toprol-XL, Pravachol  -Eliquis/plavix held for possible [facility] replacement  -IV hydralazine scheduled PRN (with parameters) to address extreme BP elevation     RAD/COPD/ZAINAB  -Continuous pulse oximetry monitoring initiated with PRN supplemental O2   -Continue home maintenance MDIs/nebulizer treatment including Stiolto Respimat  -Encourage aggressive pulmonary toilet including incentive spirometry every 4H while awake  -Albuterol nebs scheduled 4 times daily during stay  -Patient counseled on necessity of smoking cessation; nicotine replacement patch provided  -RT consulted for home CPAP setting     Uncontrolled IDDM  -A1c 8  -Continue home dosage of Lantus 30 units nightly  -Humalog scheduled AC/HS in addition to PRN SSI coverage  -Carbohydrate restriction placed on diet     Metabolic Encephalopathy with polypharmacy concerns  -Fall risk protocol in place with bed alarm activated  -TSH/ free T4, vitamin D, B12/folate, prealbumin   -Dosages of Percocet, Neurontin, Flexeril, and Seroquel to be limited in use sparingly  -Concern patient continues to require readmission due to inability to care for himself; suspect he would benefit from [facility] placement, Palliative care consult per spouse request    PAF/CVA-thalamic cva (2/26/2022)  -Plavix/Eliquis-held, statin  -CHADSVASC 6. Hep gtt-will dc if HD cath functioning appropriately    DVT Prophylaxis: Scds/Hep gtt  Diet: ADULT DIET; Regular; 4 carb choices (60 gm/meal); Low Sodium (2 gm); Low Potassium (Less than 3000 mg/day);  Low Phosphorus (Less than 1000 mg); 1200 ml  Diet NPO  Code Status: Full Code    PT/OT Eval Status: consulted    Dispo - pending clinical improvement ~2-3 days    JAY Canseco - CNP

## 2022-03-20 NOTE — PLAN OF CARE
Prolonged emergence from general anesthesia, Sleep apnea, Stroke ([facility] [address] TIA (transient ischemic attack), and Unspecified diseases of blood and blood-forming organs. >>For CHF and Comorbidity documentation on Education Time and Topics, please see Education Tab    Progressive Mobility Assessment:  What is this patient's Current Level of Mobility?: Ambulatory- with Assistance  How was this patient Mobilized today?: Patient Refuses to Mobilize                 With Whom? Nurse                 Level of Difficulty/Assistance: 1x Assist     Pt resting in bed at this time on  2 L O2. Pt denies shortness of breath. Pt with pitting lower extremity edema.      Patient and/or Family's stated Goal of Care this Admission: reduce shortness of breath, increase activity tolerance, better understand heart failure and disease management, be more comfortable, and reduce lower extremity edema prior to discharge

## 2022-03-21 ENCOUNTER — APPOINTMENT (OUTPATIENT)
Dept: INTERVENTIONAL RADIOLOGY/VASCULAR | Age: 52
DRG: 673 | End: 2022-03-21
Payer: COMMERCIAL

## 2022-03-21 LAB
ANION GAP SERPL CALCULATED.3IONS-SCNC: 16 MMOL/L (ref 3–16)
APTT: 64.8 SEC (ref 26.2–38.6)
BUN BLDV-MCNC: 46 MG/DL (ref 7–20)
CALCIUM SERPL-MCNC: 8.3 MG/DL (ref 8.3–10.6)
CHLORIDE BLD-SCNC: 93 MMOL/L (ref 99–110)
CO2: 23 MMOL/L (ref 21–32)
CREAT SERPL-MCNC: 7.3 MG/DL (ref 0.9–1.3)
GFR AFRICAN AMERICAN: 10
GFR NON-AFRICAN AMERICAN: 8
GLUCOSE BLD-MCNC: 110 MG/DL (ref 70–99)
GLUCOSE BLD-MCNC: 131 MG/DL (ref 70–99)
GLUCOSE BLD-MCNC: 81 MG/DL (ref 70–99)
GLUCOSE BLD-MCNC: 87 MG/DL (ref 70–99)
GLUCOSE BLD-MCNC: 93 MG/DL (ref 70–99)
HCT VFR BLD CALC: 28.4 % (ref 40.5–52.5)
HEMOGLOBIN: 9.4 G/DL (ref 13.5–17.5)
INR BLD: 1.03 (ref 0.88–1.12)
MAGNESIUM: 1.8 MG/DL (ref 1.8–2.4)
MCH RBC QN AUTO: 29.5 PG (ref 26–34)
MCHC RBC AUTO-ENTMCNC: 33 G/DL (ref 31–36)
MCV RBC AUTO: 89.3 FL (ref 80–100)
PDW BLD-RTO: 17.3 % (ref 12.4–15.4)
PERFORMED ON: ABNORMAL
PERFORMED ON: NORMAL
PLATELET # BLD: 260 K/UL (ref 135–450)
PMV BLD AUTO: 7.7 FL (ref 5–10.5)
POTASSIUM SERPL-SCNC: 4.5 MMOL/L (ref 3.5–5.1)
PROTHROMBIN TIME: 11.6 SEC (ref 9.9–12.7)
RBC # BLD: 3.18 M/UL (ref 4.2–5.9)
SODIUM BLD-SCNC: 132 MMOL/L (ref 136–145)
WBC # BLD: 7.1 K/UL (ref 4–11)

## 2022-03-21 PROCEDURE — 94660 CPAP INITIATION&MGMT: CPT

## 2022-03-21 PROCEDURE — 36558 INSERT TUNNELED CV CATH: CPT

## 2022-03-21 PROCEDURE — 2580000003 HC RX 258: Performed by: INTERNAL MEDICINE

## 2022-03-21 PROCEDURE — 80048 BASIC METABOLIC PNL TOTAL CA: CPT

## 2022-03-21 PROCEDURE — 85610 PROTHROMBIN TIME: CPT

## 2022-03-21 PROCEDURE — 94761 N-INVAS EAR/PLS OXIMETRY MLT: CPT

## 2022-03-21 PROCEDURE — 83735 ASSAY OF MAGNESIUM: CPT

## 2022-03-21 PROCEDURE — 2700000000 HC OXYGEN THERAPY PER DAY

## 2022-03-21 PROCEDURE — 99153 MOD SED SAME PHYS/QHP EA: CPT

## 2022-03-21 PROCEDURE — 85027 COMPLETE CBC AUTOMATED: CPT

## 2022-03-21 PROCEDURE — 2580000003 HC RX 258: Performed by: HOSPITALIST

## 2022-03-21 PROCEDURE — 2580000003 HC RX 258: Performed by: RADIOLOGY

## 2022-03-21 PROCEDURE — 05HN33Z INSERTION OF INFUSION DEVICE INTO LEFT INTERNAL JUGULAR VEIN, PERCUTANEOUS APPROACH: ICD-10-PCS | Performed by: INTERNAL MEDICINE

## 2022-03-21 PROCEDURE — 36598 INJ W/FLUOR EVAL CV DEVICE: CPT

## 2022-03-21 PROCEDURE — 36589 REMOVAL TUNNELED CV CATH: CPT

## 2022-03-21 PROCEDURE — 36415 COLL VENOUS BLD VENIPUNCTURE: CPT

## 2022-03-21 PROCEDURE — 2500000003 HC RX 250 WO HCPCS: Performed by: HOSPITALIST

## 2022-03-21 PROCEDURE — C1750 CATH, HEMODIALYSIS,LONG-TERM: HCPCS

## 2022-03-21 PROCEDURE — 6360000002 HC RX W HCPCS: Performed by: RADIOLOGY

## 2022-03-21 PROCEDURE — 99152 MOD SED SAME PHYS/QHP 5/>YRS: CPT

## 2022-03-21 PROCEDURE — 1200000000 HC SEMI PRIVATE

## 2022-03-21 PROCEDURE — 0JH63XZ INSERTION OF TUNNELED VASCULAR ACCESS DEVICE INTO CHEST SUBCUTANEOUS TISSUE AND FASCIA, PERCUTANEOUS APPROACH: ICD-10-PCS | Performed by: INTERNAL MEDICINE

## 2022-03-21 PROCEDURE — 85730 THROMBOPLASTIN TIME PARTIAL: CPT

## 2022-03-21 PROCEDURE — 94640 AIRWAY INHALATION TREATMENT: CPT

## 2022-03-21 PROCEDURE — 6360000002 HC RX W HCPCS: Performed by: INTERNAL MEDICINE

## 2022-03-21 PROCEDURE — 6370000000 HC RX 637 (ALT 250 FOR IP): Performed by: HOSPITALIST

## 2022-03-21 RX ORDER — MIDAZOLAM HYDROCHLORIDE 1 MG/ML
INJECTION INTRAMUSCULAR; INTRAVENOUS
Status: COMPLETED | OUTPATIENT
Start: 2022-03-21 | End: 2022-03-21

## 2022-03-21 RX ORDER — FENTANYL CITRATE 50 UG/ML
INJECTION, SOLUTION INTRAMUSCULAR; INTRAVENOUS
Status: COMPLETED | OUTPATIENT
Start: 2022-03-21 | End: 2022-03-21

## 2022-03-21 RX ADMIN — GABAPENTIN 100 MG: 100 CAPSULE ORAL at 09:01

## 2022-03-21 RX ADMIN — APIXABAN 5 MG: 5 TABLET, FILM COATED ORAL at 21:01

## 2022-03-21 RX ADMIN — GABAPENTIN 100 MG: 100 CAPSULE ORAL at 21:01

## 2022-03-21 RX ADMIN — METOPROLOL SUCCINATE 50 MG: 50 TABLET, EXTENDED RELEASE ORAL at 21:01

## 2022-03-21 RX ADMIN — FENTANYL CITRATE 50 MCG: 50 INJECTION INTRAMUSCULAR; INTRAVENOUS at 14:36

## 2022-03-21 RX ADMIN — SODIUM CHLORIDE, PRESERVATIVE FREE 10 ML: 5 INJECTION INTRAVENOUS at 21:04

## 2022-03-21 RX ADMIN — QUETIAPINE FUMARATE 50 MG: 25 TABLET ORAL at 21:01

## 2022-03-21 RX ADMIN — SODIUM CHLORIDE, PRESERVATIVE FREE 10 ML: 5 INJECTION INTRAVENOUS at 09:01

## 2022-03-21 RX ADMIN — OXYCODONE AND ACETAMINOPHEN 1 TABLET: 5; 325 TABLET ORAL at 20:11

## 2022-03-21 RX ADMIN — OXYCODONE AND ACETAMINOPHEN 1 TABLET: 5; 325 TABLET ORAL at 09:01

## 2022-03-21 RX ADMIN — METOPROLOL SUCCINATE 50 MG: 50 TABLET, EXTENDED RELEASE ORAL at 09:01

## 2022-03-21 RX ADMIN — CEFAZOLIN 2000 MG: 1 INJECTION, POWDER, FOR SOLUTION INTRAMUSCULAR; INTRAVENOUS at 14:24

## 2022-03-21 RX ADMIN — DOCUSATE SODIUM 100 MG: 100 CAPSULE, LIQUID FILLED ORAL at 21:00

## 2022-03-21 RX ADMIN — MIDAZOLAM 1 MG: 1 INJECTION INTRAMUSCULAR; INTRAVENOUS at 14:36

## 2022-03-21 RX ADMIN — PRAVASTATIN SODIUM 40 MG: 40 TABLET ORAL at 09:03

## 2022-03-21 RX ADMIN — TIOTROPIUM BROMIDE AND OLODATEROL 2 PUFF: 3.124; 2.736 SPRAY, METERED RESPIRATORY (INHALATION) at 07:36

## 2022-03-21 RX ADMIN — DULOXETINE 60 MG: 60 CAPSULE, DELAYED RELEASE ORAL at 09:01

## 2022-03-21 RX ADMIN — INSULIN LISPRO 10 UNITS: 100 INJECTION, SOLUTION INTRAVENOUS; SUBCUTANEOUS at 16:26

## 2022-03-21 RX ADMIN — Medication 2060 UNITS/HR: at 00:01

## 2022-03-21 RX ADMIN — CALCIUM ACETATE 667 MG: 667 CAPSULE ORAL at 16:57

## 2022-03-21 RX ADMIN — NEPHROCAP 1 MG: 1 CAP ORAL at 09:01

## 2022-03-21 ASSESSMENT — PAIN SCALES - GENERAL
PAINLEVEL_OUTOF10: 8
PAINLEVEL_OUTOF10: 7

## 2022-03-21 NOTE — BRIEF OP NOTE
Brief Operative Note      Patient: Tex Jara MRN: [id number]     YOB: 1970  Age: 46 y.o. Sex: male        DATE OF PROCEDURE: 3/21/2022     PROCEDURE PERFORMED: removal left tunneled IJ HD catheter. Place new. SURGEON: August Laboy MD, MD    ANESTHESIA: Fentanyl, Versed    Estimated Blood Loss:none    Complications: None. Device implanted: 23 cm Left tunneled IJ catheter to mid right atrium.            Specimen: none    Electronically signed by August Laboy MD on 3/21/2022 at 3:04 PM

## 2022-03-21 NOTE — PROGRESS NOTES
Oxygen documentation:    1. O2 saturation at REST on ROOM AIR = 88%    If saturation is 89% or above please proceed with steps 2 and 3. 2. O2 saturation with AMBULATION of 14 feet on ROOM AIR = 88%  3.  O2 saturation with AMBULATION on 1 liter/min = 96%    DCP notified: ______

## 2022-03-21 NOTE — CARE COORDINATION
CM awaiting fax from [employer] for home O2. Pt on 3 L, wears 0 at home, has cpap through [facility]. Walk test in sticky note. Pt to have dialysis cath replaced today and then diuresis tomorrow. Pt is MWF @ 1100 through [facility] [employer]. Pt will have transport home. CM will continue to follow for needs, including possible HHC and home O2.   Natali Thorpe RN

## 2022-03-21 NOTE — PROGRESS NOTES
03/20/22 2159   NIV Type   Skin Assessment Clean, dry, & intact   Skin Protection for O2 Device Yes   Location Nose   Equipment Type v60   Mode CPAP   Mask Type Full face mask   Mask Size Medium   Settings/Measurements   CPAP/EPAP 12 cmH2O   Resp 17   FiO2  30 %   Vt Exhaled 329 mL   Minute Volume 5.8 Liters   Mask Leak (lpm) 40 lpm   Comfort Level Good   Using Accessory Muscles No

## 2022-03-21 NOTE — PROGRESS NOTES
Nephrology Progress Note   [facility]      This patient is a 46year old male whom we are following for ESKD. Subjective: The patient was seen and examined; he feels well today with no CP, SOB, nausea or vomiting. Heparin gtt now on hold for [facility] [place] exchange. He is ok with skipping dialysis today and doing it Tuesday/wednesday     ROS: No fever or chills. Feeling ok. No SOB  Social: No family at bedside. Vitals:  /64   Pulse 61   Temp 97.6 °F (36.4 °C) (Oral)   Resp 16   Ht 5' 9\" (1.753 m)   Wt 250 lb (113.4 kg)   SpO2 97%   BMI 36.92 kg/m²   I/O last 3 completed shifts: In: 1721 [P.O.:1721]  Out: 450 [Urine:450]  I/O this shift: In: 48 [P.O.:50]  Out: 0     Physical Exam:  Physical Exam  Vitals reviewed. Constitutional:       Appearance: Normal appearance. HENT:      Head: Normocephalic and atraumatic. Eyes:      General: No scleral icterus. Conjunctiva/sclera: Conjunctivae normal.   Cardiovascular:      Rate and Rhythm: Normal rate. Heart sounds: No friction rub. Pulmonary:      Effort: Pulmonary effort is normal. No respiratory distress. Abdominal:      General: Bowel sounds are normal. There is no distension. Tenderness: There is no abdominal tenderness. Musculoskeletal:      Right lower leg: No edema. Left lower leg: No edema. Neurological:      Mental Status: He is alert.        Access: [facility]J TDC      Medications:   doxercalciferol  15 mcg IntraVENous Q MWF    epoetin siddharth-epbx  8,000 Units IntraVENous Q MWF    lidocaine  1 patch TransDERmal Daily    [Held by provider] apixaban  5 mg Oral BID    b complex-C-folic acid  1 capsule Oral Daily    calcium acetate  1 capsule Oral TID WC    [Held by provider] clopidogrel  75 mg Oral Daily    docusate sodium  100 mg Oral BID    DULoxetine  60 mg Oral Daily    fluticasone  1 spray Each Nostril Daily    gabapentin  100 mg Oral TID    insulin glargine  30 Units SubCUTAneous Nightly    linaclotide 145 mcg Oral QAM AC    metoprolol succinate  50 mg Oral BID    pantoprazole  40 mg Oral QAM AC    pravastatin  40 mg Oral Daily    QUEtiapine  50 mg Oral Nightly    tiotropium-olodaterol  2 puff Inhalation Daily    insulin lispro  0-12 Units SubCUTAneous TID     insulin lispro  0-6 Units SubCUTAneous Nightly    sodium chloride flush  10 mL IntraVENous 2 times per day    insulin lispro  0.08 Units/kg SubCUTAneous TID          Labs:  Recent Labs     03/19/22  0334 03/21/22  0736   WBC 6.4 7.1   HGB 9.1* 9.4*   HCT 28.1* 28.4*   MCV 90.6 89.3    260     Recent Labs     03/19/22  0334 03/20/22  0824 03/21/22  0736   * 133* 132*   K 4.3 4.5 4.5   CL 94* 95* 93*   CO2 24 23 23   GLUCOSE 100* 123* 131*   MG 1.70* 1.80 1.80   BUN 46* 33* 46*   CREATININE 6.8* 5.6* 7.3*   LABGLOM 9* 11* 8*   GFRAA 10* 13* 10*           Assessment/Plan:    ESRD.  - On HD [address] - Labs stable and he is not short of breath, still awaiting replacement of line so will do dialysis Tuesday      [facility]  - Plan for [facility] replacement on Monday by IR.     Hyperkalemia.  - From missed dialysis. - Received Lokelma 10g x 3 doses.     Hypervolemic Hyponatremia. - UF with HD.  - Fluid restriction.     Hypertension.  - On Metoprolol.     CKD-MBD.  - Continue calcium acetate, Hectorol and Cinacalcet.     Anemia.  - On EPO qHD.

## 2022-03-21 NOTE — OR NURSING
Pt arrived for image guided tunneled dialysis catheter removal/insertion . Procedure explained including the risk and benefits of the procedure. All questions answered. Pt verbalizes understanding of the procedure and states no more questions. Consent confirmed. Vital signs stable. Labs, allergies, medications, and code status reviewed. No contraindications noted.      Vital Signs  Vitals:    03/21/22 1430   BP: (!) 159/89   Pulse: 59   Resp: 12   Temp:    SpO2: 100%    (vital signs in table format)    Pre Ernesto Score  2 - Able to move 4 extremities voluntarily on command  2 - BP+/- 20mmHg of normal  2 - Fully awake  1 - Needs oxygen to maintain oxygen saturation >90%  2 - Able to breathe deeply and cough freely    Allergies  Morphine (allergies)    Labs  Lab Results   Component Value Date    INR 1.03 03/21/2022    PROTIME 11.6 03/21/2022     Lab Results   Component Value Date    CREATININE 7.3 (HH) 03/21/2022    BUN 46 (H) 03/21/2022     (L) 03/21/2022    GFR >60 05/17/2013    K 4.5 03/21/2022    CL 93 (L) 03/21/2022    CO2 23 03/21/2022     Lab Results   Component Value Date    WBC 7.1 03/21/2022    HGB 9.4 (L) 03/21/2022    HCT 28.4 (L) 03/21/2022    MCV 89.3 03/21/2022     03/21/2022

## 2022-03-21 NOTE — PROGRESS NOTES
03/21/22 0348   NIV Type   $NIV $Daily Charge   Skin Assessment Clean, dry, & intact   Skin Protection for O2 Device Yes   Location Nose   Equipment Type v60   Mode CPAP   Mask Type Full face mask   Mask Size Medium   Settings/Measurements   CPAP/EPAP 12 cmH2O   Resp 15   FiO2  30 %   Vt Exhaled 397 mL   Minute Volume 6.7 Liters   Mask Leak (lpm) 80 lpm   Comfort Level Good   Using Accessory Muscles No

## 2022-03-21 NOTE — PROGRESS NOTES
Report given to [facility]. Call light and bedside table within reach. No needs voiced at this time. Bed in lowest position, brakes locked.

## 2022-03-21 NOTE — PLAN OF CARE
Problem: OXYGENATION/RESPIRATORY FUNCTION  Goal: Patient will maintain patent airway  3/21/2022 1330 by Beverly Goodell, RN  Outcome: Ongoing     Patient's EF (Ejection Fraction) is greater than 40%    Heart Failure Medications:  Diuretics[de-identified] None-HD pt    (One of the following REQUIRED for EF </= 40%/SYSTOLIC FAILURE but MAY be used in EF% >40%/DIASTOLIC FAILURE)        ACE[de-identified] None        ARB[de-identified] None         ARNI[de-identified] None    (Beta Blockers)  NON- Evidenced Based Beta Blocker (for EF% >40%/DIASTOLIC FAILURE): None    Evidenced Based Beta Blocker::(REQUIRED for EF% <40%/SYSTOLIC FAILURE) Metoprolol SUCCinate- Toprol XL  . .................................................................................................................................................. Patient's weights and intake/output reviewed: Yes    Patient's Last Weight: 250 lbs obtained by standing scale. Difference of 2 lbs more than last documented weight.       Intake/Output Summary (Last 24 hours) at 3/21/2022 1330  Last data filed at 3/21/2022 1235  Gross per 24 hour   Intake 1431 ml   Output 650 ml   Net 781 ml       Comorbidities Reviewed Yes    Patient has a past medical history of Ambulatory dysfunction, Aortic stenosis, Arthritis, Asthma, Bilateral hilar adenopathy syndrome, CAD (coronary artery disease), Cardiomyopathy ([address] CHF (congestive heart failure) ([address] Chronic pain, COPD (chronic obstructive pulmonary disease) ([address] Depression, Diabetes mellitus ([address] Difficult intravenous access, Emphysema of lung ([address] ESRD (end stage renal disease) on dialysis ([facility] [address] Fear of needles, Gastric ulcer, GERD (gastroesophageal reflux disease), Heart valve problem, Hemodialysis patient ([facility] [address] History of spinal fracture, Hx of blood clots, Hyperlipidemia, Hypertension, MI (myocardial infarction) ([facility] [address] Neuromuscular disorder ([facility] [address] Numbness and tingling in left arm, Pneumonia, PONV (postoperative nausea and vomiting), Prolonged emergence from general

## 2022-03-21 NOTE — OR NURSING
Time out completed prior to procedure. Pt was place supine on the IR table. Pt was placed on all cardiac monitoring.  Pt arrived on 2 L NC

## 2022-03-21 NOTE — PROGRESS NOTES
Hospitalist Progress Note      PCP: Morelia Hamilton MD    Date of Admission: 3/17/2022    Chief Complaint: Shortness of breath    Hospital Course: Lulu Ann is a 46 y. o. male who presented to the ED where for acute worsening dyspnea s/p skipping HD session yesterday. Alf Hammer of epic chart reveals the patient is hospitalized almost twice every month related to his inability to appropriately care for himself.  The patient has multiple comorbidities including: ESRD on HD, COPD/ZAINAB, IDDM, CVA with residual deficit, and CHF with renovascular HTN.   Patient is  again altered upon arrival, with concerns for polypharmacy (which has been expressed by several providers at different encounters).    Upon arrival to the ED patient was markedly hypertensive 214/105.  Stat EKG revealed sinus tachycardia without evidence of acute ischemia.  Notable labs include: Hyponatremia 127, hyperkalemia 5.6, BUN/CR 76/9.8 glucose 137, BNP 55,392, and anemia H/H 10.7/22. 7.  ED provider contacted [facility] discuss need for urgent hemodialysis and correction of current HD access site. Subjective: Seen resting in bed shortly after returning from tunneled catheter placement by IR. Has no complaints at this time. VSS.   Afebrile      Medications:  Reviewed    Infusion Medications    heparin (PORCINE) Infusion Stopped (03/21/22 0815)    dextrose      sodium chloride       Scheduled Medications    doxercalciferol  15 mcg IntraVENous Q MWF    epoetin siddharth-epbx  8,000 Units IntraVENous Q MWF    lidocaine  1 patch TransDERmal Daily    [Held by provider] apixaban  5 mg Oral BID    b complex-C-folic acid  1 capsule Oral Daily    calcium acetate  1 capsule Oral TID WC    [Held by provider] clopidogrel  75 mg Oral Daily    docusate sodium  100 mg Oral BID    DULoxetine  60 mg Oral Daily    fluticasone  1 spray Each Nostril Daily    gabapentin  100 mg Oral TID    insulin glargine  30 Units SubCUTAneous Nightly    linaclotide 145 mcg Oral QAM AC    metoprolol succinate  50 mg Oral BID    pantoprazole  40 mg Oral QAM AC    pravastatin  40 mg Oral Daily    QUEtiapine  50 mg Oral Nightly    tiotropium-olodaterol  2 puff Inhalation Daily    insulin lispro  0-12 Units SubCUTAneous TID WC    insulin lispro  0-6 Units SubCUTAneous Nightly    sodium chloride flush  10 mL IntraVENous 2 times per day    insulin lispro  0.08 Units/kg SubCUTAneous TID      PRN Meds: heparin (porcine), heparin (porcine), nitroGLYCERIN, cyclobenzaprine, oxyCODONE-acetaminophen, glucose, dextrose bolus (hypoglycemia), glucagon (rDNA), dextrose, sodium chloride flush, sodium chloride, promethazine **OR** ondansetron, senna, acetaminophen **OR** acetaminophen, magnesium sulfate, albuterol, hydrALAZINE      Intake/Output Summary (Last 24 hours) at 3/21/2022 0847  Last data filed at 3/21/2022 0002  Gross per 24 hour   Intake 1381 ml   Output 450 ml   Net 931 ml       Physical Exam Performed:    /64   Pulse 61   Temp 97.6 °F (36.4 °C) (Oral)   Resp 16   Ht 5' 9\" (1.753 m)   Wt 250 lb (113.4 kg)   SpO2 97%   BMI 36.92 kg/m²     General appearance: No apparent distress, appears stated age and cooperative. HEENT: Pupils equal, round, and reactive to light. Conjunctivae/corneas clear. Neck: Supple, with full range of motion. No jugular venous distention. Trachea midline. Respiratory:  Normal respiratory effort. Clear to auscultation, bilaterally without Rales/Wheezes/Rhonchi. Cardiovascular: Regular rate and rhythm with normal S1/S2 without murmurs, rubs or gallops. Abdomen: Soft, non-tender, non-distended with normal bowel sounds. Musculoskeletal: No clubbing, cyanosis or edema bilaterally. Full range of motion without deformity. Skin: Skin color, texture, turgor normal.  No rashes or lesions. Neurologic:  Neurovascularly intact without any focal sensory/motor deficits.  Cranial nerves: II-XII intact, grossly non-focal.  Psychiatric: Alert and oriented, thought content appropriate, normal insight  Capillary Refill: Brisk,3 seconds, normal   Peripheral Pulses: +2 palpable, equal bilaterally       Labs:   Recent Labs     03/19/22  0334 03/21/22  0736   WBC 6.4 7.1   HGB 9.1* 9.4*   HCT 28.1* 28.4*    260     Recent Labs     03/19/22  0334 03/20/22  0824 03/21/22  0736   * 133* 132*   K 4.3 4.5 4.5   CL 94* 95* 93*   CO2 24 23 23   BUN 46* 33* 46*   CREATININE 6.8* 5.6* 7.3*   CALCIUM 7.6* 8.0* 8.3     No results for input(s): AST, ALT, BILIDIR, BILITOT, ALKPHOS in the last 72 hours. No results for input(s): INR in the last 72 hours. No results for input(s): Xochitl Pace in the last 72 hours. Urinalysis:      Lab Results   Component Value Date    NITRU Negative 03/18/2022    WBCUA  03/18/2022    BACTERIA 1+ 03/18/2022    RBCUA 3-4 03/18/2022    BLOODU TRACE-INTACT 03/18/2022    SPECGRAV 1.020 03/18/2022    GLUCOSEU 100 03/18/2022       Radiology:  XR CHEST PORTABLE   Final Result   Limited study. Mild hazy opacity peripherally over the left mid lung is   likely due to rotation and overlying soft tissues. No definite acute process. IR TUNNELED CVC PLACE WO SQ PORT/PUMP > 5 YEARS    (Results Pending)           Assessment/Plan:    Active Hospital Problems    Diagnosis     Type 2 diabetes, uncontrolled, with neuropathy (HCC) [E11.40, E11.65]      Priority: High    CHF (congestive heart failure) (HCC) [I50.9]      Priority: High    Pulmonary edema [J81.1]     Renovascular hypertension [I15.0]     Neuromuscular disorder (HCC) [G70.9]     ESRD (end stage renal disease) ([facility] [address] [N18.6]     Tobacco abuse [Z72.0]     ZAINAB on CPAP [G47.33, Z99.89]     Obesity (BMI 30-39. 9) [E66.9]      Pulmonary edema s/p HD noncompliance/ESRD/Hyperkalemia/Hyponatremia  -Tele, I&O's  -Nephrology consulted  -Echo 1/2022 EF 40-45%  -2G Na and fluid restriction dietary modifications in place  -TDC placed today by IR     Renovascular HTN with combined CHF  -EKG without evidence of acute ischemia  -Continue home medication dosage of Toprol-XL, Pravachol  -Resume Eliquis and Plavix tomorrow  -IV hydralazine scheduled PRN (with parameters) to address extreme BP elevation      RAD/COPD/ZAINAB  -Continuous pulse oximetry monitoring initiated with PRN supplemental O2   -Continue home maintenance MDIs/nebulizer treatment including Stiolto Respimat  -Encourage aggressive pulmonary toilet including incentive spirometry every 4H while awake  -Albuterol nebs scheduled 4 times daily during stay  -Patient counseled on necessity of smoking cessation; nicotine replacement patch provided  -RT consulted for home CPAP setting     Uncontrolled IDDM  -A1c 8  -Continue home dosage of Lantus 30 units nightly  -Humalog scheduled AC/HS in addition to PRN SSI coverage  -Carbohydrate restriction placed on diet     Metabolic Encephalopathy with polypharmacy concerns  -Fall risk protocol in place with bed alarm activated  -TSH/ free T4, vitamin D, B12/folate, prealbumin   -Dosages of Percocet, Neurontin, Flexeril, and Seroquel to be limited in use sparingly  -Concern patient continues to require readmission due to inability to care for himself; suspect he would benefit from [facility] placement, Palliative care consult per spouse request     PAF/CVA-thalamic cva (2/26/2022)  -Plavix/Eliquis-held, statin  -CHADSVASC 6.  Hep gtt-will dc if HD cath functioning appropriately    DVT Prophylaxis: SCDs, home Eliquis restarted tonight  Diet: Diet NPO  Code Status: Full Code    PT/OT Eval Status: Consulted    Dispo -likely 1-2 more days pending clinical course    Nae Bainma

## 2022-03-21 NOTE — PLAN OF CARE
Problem: Pain:  Goal: Pain level will decrease  Description: Pain level will decrease  Outcome: Met This Shift  Goal: Control of acute pain  Description: Control of acute pain  Outcome: Met This Shift     Problem: Skin Integrity:  Goal: Absence of new skin breakdown  Description: Absence of new skin breakdown  Outcome: Met This Shift     Problem: OXYGENATION/RESPIRATORY FUNCTION  Goal: Patient will maintain patent airway  Outcome: Met This Shift     Problem: HEMODYNAMIC STATUS  Goal: Patient has stable vital signs and fluid balance  Outcome: Ongoing     Problem: FLUID AND ELECTROLYTE IMBALANCE  Goal: Fluid and electrolyte balance are achieved/maintained  Outcome: Ongoing     Patient's EF (Ejection Fraction) is greater than 40%    Heart Failure Medications:   Diuretics[de-identified] None     (One of the following REQUIRED for EF </= 40%/SYSTOLIC FAILURE but MAY be used in EF% >40%/DIASTOLIC FAILURE)        ACE[de-identified] None        ARB[de-identified] None         ARNI[de-identified] None    (Beta Blockers)   NON- Evidenced Based Beta Blocker (for EF% >40%/DIASTOLIC FAILURE): None     Evidenced Based Beta Blocker::(REQUIRED for EF% <40%/SYSTOLIC FAILURE) Metoprolol SUCCinate- Toprol XL  . .................................................................................................................................................. Patient's weights and intake/output reviewed: Yes    Patient's Last Weight: 250 lbs obtained by standing scale. Difference of 2 lbs more than last documented weight.       Intake/Output Summary (Last 24 hours) at 3/21/2022 0150  Last data filed at 3/21/2022 0002  Gross per 24 hour   Intake 1501 ml   Output 450 ml   Net 1051 ml       Comorbidities Reviewed Yes    Patient has a past medical history of Ambulatory dysfunction, Aortic stenosis, Arthritis, Asthma, Bilateral hilar adenopathy syndrome, CAD (coronary artery disease), Cardiomyopathy ([address] CHF (congestive heart failure) ([facility] [address] Chronic pain, COPD (chronic obstructive pulmonary disease) ([facility] [address] Depression, Diabetes mellitus ([address] Difficult intravenous access, Emphysema of lung ([address] ESRD (end stage renal disease) on dialysis ([facility] [address] Fear of needles, Gastric ulcer, GERD (gastroesophageal reflux disease), Heart valve problem, Hemodialysis patient ([facility] [address] History of spinal fracture, Hx of blood clots, Hyperlipidemia, Hypertension, MI (myocardial infarction) ([facility] [address] Neuromuscular disorder ([facility] [address] Numbness and tingling in left arm, Pneumonia, PONV (postoperative nausea and vomiting), Prolonged emergence from general anesthesia, Sleep apnea, Stroke ([facility] [address] TIA (transient ischemic attack), and Unspecified diseases of blood and blood-forming organs. >>For CHF and Comorbidity documentation on Education Time and Topics, please see Education Tab    Progressive Mobility Assessment:  What is this patient's Current Level of Mobility?: Ambulatory- with Assistance  How was this patient Mobilized today?: Edge of Bed, Up to Chair, and Up in Room, ambulated 5 ft                 With Whom? Nurse, PCA, and Self                 Level of Difficulty/Assistance: 1x Assist     Pt resting in bed at this time on CPAP. Pt denies shortness of breath. Pt with pitting lower extremity edema.      Patient and/or Family's stated Goal of Care this Admission: reduce shortness of breath, increase activity tolerance, better understand heart failure and disease management, be more comfortable, and reduce lower extremity edema prior to discharge        :

## 2022-03-21 NOTE — PROGRESS NOTES
Image guided tunneled dialysis catheter completed. Dr. Garrison Arceo placed a 14.5 [place] 22 cm [employer] tunneled dialysis catheter LOT [id number] EXP 6/30/2023 in the left IJ. Line aspirates and flushes with ease. Dialysis catheter secured with sutures. Surgical site dressing clean, dry, and intact. Each dialysis access lumen heparin locked with 1.8 ml of IV heparin each. Pt tolerated procedure without any signs or symptoms of distress. Vital signs stable. Report called to  RN. Pt transported back to [facility] in stable condition via bed by transport. Line ok to use per Garrison Arceo.     Vital Signs  Vitals:    03/21/22 1501   BP: (!) 146/82   Pulse: 58   Resp: 15   Temp:    SpO2: 100%        Can restart Heparin IV: 4 hours after procedure

## 2022-03-21 NOTE — PRE SEDATION
Sedation Pre-Procedure Note    Patient Name: Malika Hester   YOB: 1970  Room/Bed: 0207/0207-01  Medical Record Number: [id number]  Date: 3/21/2022   Time: 2:15 PM       Indication:  Removal left IJ tunneled HD cath unknown length of time of placement. Replace with new HD catheter    Consent: I have discussed with the patient and/or the patient representative the indication, alternatives, and the possible risks and/or complications of the planned procedure and the anesthesia methods. The patient and/or patient representative appear to understand and agree to proceed. Vital Signs:   Vitals:    03/21/22 1145   BP: 122/65   Pulse: 58   Resp: 16   Temp: 97.7 °F (36.5 °C)   SpO2: 99%       Past Medical History:   has a past medical history of Ambulatory dysfunction, Aortic stenosis, Arthritis, Asthma, Bilateral hilar adenopathy syndrome, CAD (coronary artery disease), Cardiomyopathy ([address] CHF (congestive heart failure) ([address] Chronic pain, COPD (chronic obstructive pulmonary disease) ([address] Depression, Diabetes mellitus ([address] Difficult intravenous access, Emphysema of lung ([address] ESRD (end stage renal disease) on dialysis ([address] Fear of needles, Gastric ulcer, GERD (gastroesophageal reflux disease), Heart valve problem, Hemodialysis patient ([address] History of spinal fracture, Hx of blood clots, Hyperlipidemia, Hypertension, MI (myocardial infarction) ([address] Neuromuscular disorder ([address] Numbness and tingling in left arm, Pneumonia, PONV (postoperative nausea and vomiting), Prolonged emergence from general anesthesia, Sleep apnea, Stroke ([address] TIA (transient ischemic attack), and Unspecified diseases of blood and blood-forming organs. Past Surgical History:   has a past surgical history that includes Tonsillectomy; cyst removal (08/14/2013); Colonoscopy; Coronary angioplasty with stent (05/26/15); vascular surgery (aprx 2 years ago); Colonoscopy (2/29/2015);  Upper gastrointestinal endoscopy (01/06/2016); Upper gastrointestinal endoscopy (01/29/2017); other surgical history (02/01/2017); Dialysis fistula creation (Left, 10/30/2017); Diagnostic Cardiac Cath Lab Procedure; other surgical history (2018); other surgical history (Bilateral, 06/26/2018); pr lap insertion tunneled intraperitoneal catheter (N/A, 9/21/2018); other surgical history (Right, 09/2018); Dialysis fistula creation (Left, 3/27/2019); other surgical history (05/28/2019); Endoscopy, colon, diagnostic; Catheter Removal (Right, 7/2/2019); Aortic valve replacement (N/A, 10/15/2019); and Rectal surgery (N/A, 2/24/2022).     Medications:   Scheduled Meds:    ceFAZolin (ANCEF) 2000 mg in dextrose 5 % 100 mL IVPB        doxercalciferol  15 mcg IntraVENous Q MWF    epoetin siddharth-epbx  8,000 Units IntraVENous Q MWF    lidocaine  1 patch TransDERmal Daily    [Held by provider] apixaban  5 mg Oral BID    b complex-C-folic acid  1 capsule Oral Daily    calcium acetate  1 capsule Oral TID WC    [Held by provider] clopidogrel  75 mg Oral Daily    docusate sodium  100 mg Oral BID    DULoxetine  60 mg Oral Daily    fluticasone  1 spray Each Nostril Daily    gabapentin  100 mg Oral TID    insulin glargine  30 Units SubCUTAneous Nightly    linaclotide  145 mcg Oral QAM AC    metoprolol succinate  50 mg Oral BID    pantoprazole  40 mg Oral QAM AC    pravastatin  40 mg Oral Daily    QUEtiapine  50 mg Oral Nightly    tiotropium-olodaterol  2 puff Inhalation Daily    insulin lispro  0-12 Units SubCUTAneous TID WC    insulin lispro  0-6 Units SubCUTAneous Nightly    sodium chloride flush  10 mL IntraVENous 2 times per day    insulin lispro  0.08 Units/kg SubCUTAneous TID WC     Continuous Infusions:    heparin (PORCINE) Infusion Stopped (03/21/22 0815)    dextrose      sodium chloride       PRN Meds: heparin (porcine), heparin (porcine), nitroGLYCERIN, cyclobenzaprine, oxyCODONE-acetaminophen, glucose, dextrose bolus (hypoglycemia), glucagon (rDNA), dextrose, sodium chloride flush, sodium chloride, promethazine **OR** ondansetron, senna, acetaminophen **OR** acetaminophen, magnesium sulfate, albuterol, hydrALAZINE  Home Meds:   Prior to Admission medications    Medication Sig Start Date End Date Taking? Authorizing Provider   mineral oil liquid Take 30 mLs by mouth daily as needed for Constipation 3/9/22   Amaris Lee MD   sennosides-docusate sodium (SENOKOT-S) 8.6-50 MG tablet Take 1 tablet by mouth daily 3/9/22   Amaris Lee MD   oxyCODONE-acetaminophen (PERCOCET) 7.5-325 MG per tablet Take 1 tablet by mouth every 6 hours as needed (pain) for up to 30 days.  3/7/22 4/6/22  Amaris Lee MD   cyclobenzaprine (FLEXERIL) 10 MG tablet TAKE 1 TABLET BY MOUTH EVERY 8 HOURS AS NEEDED 3/7/22   JAY Vines CNP   metoprolol succinate (TOPROL XL) 50 MG extended release tablet Take 1 tablet by mouth in the morning and at bedtime 3/3/22   Samina Garsia MD   apixaban (ELIQUIS) 5 MG TABS tablet Take 1 tablet by mouth 2 times daily 3/3/22   Samina Garsia MD   pantoprazole (PROTONIX) 40 MG tablet TAKE 1 TABLET BY MOUTH EVERY MORNING BEFORE BREAKFAST 2/22/22   JAY English CNP   pravastatin (PRAVACHOL) 40 MG tablet Take 1 tablet by mouth daily 2/10/22   JAY Morales CNP   clopidogrel (PLAVIX) 75 MG tablet Take 1 tablet by mouth daily 2/10/22   JAY Morales CNP   QUEtiapine (SEROQUEL) 50 MG tablet TAKE 1 TABLET BY MOUTH IN THE EVENING 1/25/22   Amaris Lee MD   fluticasone (FLONASE) 50 MCG/ACT nasal spray SHAKE LIQUID AND USE 2 SPRAYS IN [facility] NOSTRIL DAILY 1/19/22   Amaris Lee MD   insulin glargine [facility]) 100 UNIT/ML injection pen Inject 30 Units into the skin nightly 1/17/22   Rafael Méndez MD   gabapentin (NEURONTIN) 100 MG capsule TAKE 1-2 CAPSULES BY MOUTH THREE TIMES A DAY 11/29/21 2/10/22  Amaris Lee MD    MG capsule TAKE 1 CAPSULE BY MOUTH TWICE DAILY 11/12/21   JAY Solano - CNP   Continuous Blood Gluc Sensor (DEXCOM G6 SENSOR) MISC Every 10 days 10/5/21   Arya Tobin MD   Continuous Blood Gluc Transmit (DEXCOM G6 TRANSMITTER) MISC 1 each by Does not apply route every 3 months 10/5/21   Arya Tobin MD   Continuous Blood Gluc  (DEXCOM G6 ) ADAM 1 each by Does not apply route Daily with lunch 10/5/21   Arya Tobin MD   DULoxetine (CYMBALTA) 60 MG extended release capsule TAKE 1 CAPSULE BY MOUTH EVERY DAY 10/5/21   Arya Tobin MD   traZODone (DESYREL) 150 MG tablet TAKE ONE (1) TABLET BY MOUTH NIGHTLY 9/29/21   Arya Tobin MD   B Complex-C-Folic Acid (VIRT-CAPS) 1 MG CAPS TAKE 1 CAPSULE BY MOUTH EVERY DAY 9/20/21   Arya Tobin MD   Calcium Acetate, Phos Binder, 667 MG CAPS TAKE 1 CAPSULE BY MOUTH THREE TIMES DAILY WITH MEALS 8/12/21   Arya Tobin MD   LINZESS 145 MCG capsule TAKE 1 CAPSULE BY MOUTH EVERY MORNING BEFORE BREAKFAST 5/25/21   Arya Tobin MD   nitroGLYCERIN (NITROSTAT) 0.4 MG SL tablet DISSOLVE 1 TABLET UNDER THE TONGUE AS NEEDED FOR CHEST PAIN EVERY 5 MINUTES UP TO 3 TIMES.  IF NO RELIEF CALL 911. 1/7/21   Arya Tobin MD   insulin aspart (NOVOLOG FLEXPEN) 100 UNIT/ML injection pen Inject 20 Units into the skin 3 times daily (before meals) 10/12/20   Arya Tobin MD   vitamin D (ERGOCALCIFEROL) 69866 units CAPS capsule TK 1 C PO WEEKLY 6/2/19   Historical Provider, MD   Tiotropium Bromide-Olodaterol (STIOLTO RESPIMAT) 2.5-2.5 MCG/ACT AERS Inhale 2 puffs into the lungs daily 5/21/19   Carmen Paige MD   Polyethylene Glycol 3350 GRAN  5/2/18   Historical Provider, MD   Blood Glucose Monitoring Suppl ADAM USE AS DIRECTED. 4/25/18   Keith Nguyễn MD   Alcohol Swabs PADS USE AS DIRECTED 4/25/18   Keith Nguyễn MD   albuterol sulfate  (90 Base) MCG/ACT inhaler Inhale 2 puffs into the lungs every 6 hours as needed for Wheezing 11/8/17   Fanta Wood MD   ipratropium-albuterol (Logan Javier) 0.5-2.5 (3) MG/3ML SOLN nebulizer solution Inhale 3 mLs into the lungs every 6 hours as needed for Shortness of Breath 10/15/17   Terri Durham MD   calcium carbonate (TUMS) 500 MG chewable tablet Take 1 tablet by mouth 3 times daily as needed for Heartburn. Historical Provider, MD     Coumadin Use Last 7 Days:  no  Antiplatelet drug therapy use last 7 days: Heparin stopped. Other anticoagulant use last 7 days: no  Additional Medication Information:  na      Pre-Sedation Documentation and Exam:   I have reviewed the patient's history and review of systems.     Mallampati Airway Assessment:  Mallampati Class II - (soft palate, fauces & uvula are visible)    Prior History of Anesthesia Complications:   none    ASA Classification:  Class 2 - A normal healthy patient with mild systemic disease    Sedation/ Anesthesia Plan:   intravenous sedation    Medications Planned:   midazolam (Versed) intravenously and fentanyl intravenously    Patient is an appropriate candidate for plan of sedation: yes    Electronically signed by Jacob Tovar MD on 3/21/2022 at 2:15 PM

## 2022-03-22 VITALS
HEART RATE: 67 BPM | OXYGEN SATURATION: 99 % | BODY MASS INDEX: 35.98 KG/M2 | HEIGHT: 69 IN | DIASTOLIC BLOOD PRESSURE: 88 MMHG | WEIGHT: 242.95 LBS | TEMPERATURE: 97.8 F | SYSTOLIC BLOOD PRESSURE: 149 MMHG | RESPIRATION RATE: 16 BRPM

## 2022-03-22 LAB
ANION GAP SERPL CALCULATED.3IONS-SCNC: 14 MMOL/L (ref 3–16)
BUN BLDV-MCNC: 55 MG/DL (ref 7–20)
CALCIUM SERPL-MCNC: 8.2 MG/DL (ref 8.3–10.6)
CHLORIDE BLD-SCNC: 88 MMOL/L (ref 99–110)
CO2: 24 MMOL/L (ref 21–32)
CREAT SERPL-MCNC: 7.9 MG/DL (ref 0.9–1.3)
GFR AFRICAN AMERICAN: 9
GFR NON-AFRICAN AMERICAN: 7
GLUCOSE BLD-MCNC: 111 MG/DL (ref 70–99)
GLUCOSE BLD-MCNC: 91 MG/DL (ref 70–99)
MAGNESIUM: 1.9 MG/DL (ref 1.8–2.4)
PERFORMED ON: ABNORMAL
POTASSIUM SERPL-SCNC: 4.4 MMOL/L (ref 3.5–5.1)
SODIUM BLD-SCNC: 126 MMOL/L (ref 136–145)

## 2022-03-22 PROCEDURE — 90935 HEMODIALYSIS ONE EVALUATION: CPT

## 2022-03-22 PROCEDURE — 2700000000 HC OXYGEN THERAPY PER DAY

## 2022-03-22 PROCEDURE — 80048 BASIC METABOLIC PNL TOTAL CA: CPT

## 2022-03-22 PROCEDURE — 94660 CPAP INITIATION&MGMT: CPT

## 2022-03-22 PROCEDURE — 6360000002 HC RX W HCPCS: Performed by: INTERNAL MEDICINE

## 2022-03-22 PROCEDURE — 6370000000 HC RX 637 (ALT 250 FOR IP): Performed by: INTERNAL MEDICINE

## 2022-03-22 PROCEDURE — 94761 N-INVAS EAR/PLS OXIMETRY MLT: CPT

## 2022-03-22 PROCEDURE — 6370000000 HC RX 637 (ALT 250 FOR IP): Performed by: HOSPITALIST

## 2022-03-22 PROCEDURE — 83735 ASSAY OF MAGNESIUM: CPT

## 2022-03-22 PROCEDURE — 6360000002 HC RX W HCPCS: Performed by: NURSE PRACTITIONER

## 2022-03-22 PROCEDURE — 2500000003 HC RX 250 WO HCPCS: Performed by: HOSPITALIST

## 2022-03-22 RX ORDER — HEPARIN SODIUM 1000 [USP'U]/ML
3600 INJECTION, SOLUTION INTRAVENOUS; SUBCUTANEOUS PRN
Status: DISCONTINUED | OUTPATIENT
Start: 2022-03-22 | End: 2022-03-22 | Stop reason: HOSPADM

## 2022-03-22 RX ORDER — HEPARIN SODIUM 1000 [USP'U]/ML
INJECTION, SOLUTION INTRAVENOUS; SUBCUTANEOUS
Status: DISCONTINUED
Start: 2022-03-22 | End: 2022-03-22 | Stop reason: HOSPADM

## 2022-03-22 RX ORDER — MIDODRINE HYDROCHLORIDE 5 MG/1
10 TABLET ORAL PRN
Status: DISCONTINUED | OUTPATIENT
Start: 2022-03-22 | End: 2022-03-22 | Stop reason: HOSPADM

## 2022-03-22 RX ORDER — MIDODRINE HYDROCHLORIDE 5 MG/1
TABLET ORAL
Status: DISCONTINUED
Start: 2022-03-22 | End: 2022-03-22 | Stop reason: HOSPADM

## 2022-03-22 RX ORDER — DOXERCALCIFEROL 2 UG/ML
INJECTION, SOLUTION INTRAVENOUS
Status: DISCONTINUED
Start: 2022-03-22 | End: 2022-03-22 | Stop reason: HOSPADM

## 2022-03-22 RX ORDER — DOXYCYCLINE HYCLATE 100 MG
100 TABLET ORAL 2 TIMES DAILY
Qty: 20 TABLET | Refills: 0 | Status: CANCELLED | OUTPATIENT
Start: 2022-03-22 | End: 2022-04-01

## 2022-03-22 RX ADMIN — MIDODRINE HYDROCHLORIDE 10 MG: 5 TABLET ORAL at 11:10

## 2022-03-22 RX ADMIN — GABAPENTIN 100 MG: 100 CAPSULE ORAL at 12:40

## 2022-03-22 RX ADMIN — HEPARIN SODIUM 1750 UNITS: 1000 INJECTION INTRAVENOUS; SUBCUTANEOUS at 07:30

## 2022-03-22 RX ADMIN — APIXABAN 5 MG: 5 TABLET, FILM COATED ORAL at 12:41

## 2022-03-22 RX ADMIN — CLOPIDOGREL BISULFATE 75 MG: 75 TABLET ORAL at 12:41

## 2022-03-22 RX ADMIN — DOXERCALCIFEROL 15 MCG: 4 INJECTION, SOLUTION INTRAVENOUS at 11:15

## 2022-03-22 RX ADMIN — HEPARIN SODIUM 3600 UNITS: 1000 INJECTION INTRAVENOUS; SUBCUTANEOUS at 11:20

## 2022-03-22 RX ADMIN — NEPHROCAP 1 MG: 1 CAP ORAL at 12:41

## 2022-03-22 RX ADMIN — EPOETIN ALFA-EPBX 8000 UNITS: 10000 INJECTION, SOLUTION INTRAVENOUS; SUBCUTANEOUS at 11:15

## 2022-03-22 RX ADMIN — PANTOPRAZOLE SODIUM 40 MG: 40 TABLET, DELAYED RELEASE ORAL at 06:48

## 2022-03-22 RX ADMIN — METOPROLOL SUCCINATE 50 MG: 50 TABLET, EXTENDED RELEASE ORAL at 12:41

## 2022-03-22 RX ADMIN — CALCIUM ACETATE 667 MG: 667 CAPSULE ORAL at 12:40

## 2022-03-22 RX ADMIN — DULOXETINE 60 MG: 60 CAPSULE, DELAYED RELEASE ORAL at 12:40

## 2022-03-22 RX ADMIN — PRAVASTATIN SODIUM 40 MG: 40 TABLET ORAL at 12:41

## 2022-03-22 RX ADMIN — HEPARIN SODIUM 2000 UNITS: 1000 INJECTION INTRAVENOUS; SUBCUTANEOUS at 07:25

## 2022-03-22 NOTE — FLOWSHEET NOTE
Treatment time: 3.5 hours  Net UF: 2532 ml    Pre weight: 112.8 kg   Post weight: 110.2 kg  EDW: 110 kg    Access used: Left chest wall TDC  Access function: Ok with -350 ml/min, Positional    Medications or blood products given: Midodrine, Hectorol, Retacrit, heparin    Regular outpatient schedule: [facility] ( miss HD Yesterday)    Summary of response to treatment: Pt tolerated ok with HD, asymptomatic hypotension at the last 1.hour and midodrine given. New TDC still positional function. HD completed in full, heparin dwell in TDC, capped and clamped. Cirt Line: Initial Hct: ;   End Profile : B; Refill ( Hct.1 -31.6  subtract Hct 2- 31.1): 0.5 (no Refill); BV: --9.0 %      Copy of dialysis treatment record placed in chart, to be scanned into EMR.     03/22/22 0727 03/22/22 1122   Vital Signs   BP (!) 144/68 116/65   Temp 97.2 °F (36.2 °C) 97.8 °F (36.6 °C)   Pulse 58 67   Resp 18 16   Weight 248 lb 10.9 oz (112.8 kg) 242 lb 15.2 oz (110.2 kg)   Weight Method Actual;Standing scale Actual;Standing scale   Percent Weight Change -0.53 -2.3   Post-Hemodialysis Assessment   Post-Treatment Procedures  --  Blood returned;Catheter capped, clamped and heparinized x 2 ports   Machine Disinfection Process  --  Acid/Vinegar Clean;Heat Disinfect; Exterior Machine Disinfection   Rinseback Volume (ml)  --  400 ml   Total Liters Processed (l/min)  --  61.1 l/min   Dialyzer Clearance  --  Heavily streaked   Duration of Treatment (minutes)  --  210 minutes   Heparin amount administered during treatment (units)  --  3750 units   Hemodialysis Intake (ml)  --  500 ml   Hemodialysis Output (ml)  --  3032 ml   NET Removed (ml)  --  2532 ml   Tolerated Treatment  --  Fair

## 2022-03-22 NOTE — DISCHARGE SUMMARY
Hospital Medicine Discharge Summary    Patient ID: Daneil Shown      Patient's PCP: Kathy Schlatter, MD    Admit Date: 3/17/2022     Discharge Date: 3/22/2022     Admitting Provider: Dina Dhaliwal MD     Discharge Provider: JASE Marquis     Discharge Diagnoses: Active Hospital Problems    Diagnosis     Type 2 diabetes, uncontrolled, with neuropathy (HCC) [E11.40, E11.65]      Priority: High    CHF (congestive heart failure) (HCC) [I50.9]      Priority: High    Pulmonary edema [J81.1]     Renovascular hypertension [I15.0]     Neuromuscular disorder (HCC) [G70.9]     ESRD (end stage renal disease) ([facility] [N18.6]     Tobacco abuse [Z72.0]     ZAINAB on CPAP [G47.33, Z99.89]     Obesity (BMI 30-39. 9) [E66.9]        The patient was seen and examined on day of discharge and this discharge summary is in conjunction with any daily progress note from day of discharge. Hospital Course:   Genia Ann is a 46 y. o. male who presented to the ED where for acute worsening dyspnea s/p skipping HD session yesterday. Pineda Frederick of epic chart reveals the patient is hospitalized almost twice every month related to his inability to appropriately care for himself.  The patient has multiple comorbidities including: ESRD on HD, COPD/ZAINAB, IDDM, CVA with residual deficit, and CHF with renovascular HTN.   Patient is  again altered upon arrival, with concerns for polypharmacy (which has been expressed by several providers at different encounters).    Upon arrival to the ED patient was markedly hypertensive 214/105.  Stat EKG revealed sinus tachycardia without evidence of acute ischemia.  Notable labs include: Hyponatremia 127, hyperkalemia 5.6, BUN/CR 76/9.8 glucose 137, BNP 55,392, and anemia H/H 10.7/22. 7.  ED provider contacted Jamie Murcia discuss need for urgent hemodialysis and correction of current HD access site.     Pulmonary edema s/p HD noncompliance/ESRD/Hyperkalemia/Hyponatremia  -Seen by equal bilaterally       Labs: For convenience and continuity at follow-up the following most recent labs are provided:      CBC:    Lab Results   Component Value Date    WBC 7.1 03/21/2022    HGB 9.4 03/21/2022    HCT 28.4 03/21/2022     03/21/2022       Renal:    Lab Results   Component Value Date     03/22/2022    K 4.4 03/22/2022    K 5.6 03/17/2022    CL 88 03/22/2022    CO2 24 03/22/2022    BUN 55 03/22/2022    CREATININE 7.9 03/22/2022    CALCIUM 8.2 03/22/2022    PHOS 5.3 03/17/2022         Significant Diagnostic Studies    Radiology:   IR TUNNELED CVC PLACE WO SQ PORT/PUMP > 5 YEARS   Final Result   Successful fluoroscopy guided right IJ placement of the tunneled dialysis   catheter. The catheter is available for immediate utilization. XR CHEST PORTABLE   Final Result   Limited study. Mild hazy opacity peripherally over the left mid lung is   likely due to rotation and overlying soft tissues. No definite acute process. Consults:     IP CONSULT TO NEPHROLOGY  IP CONSULT TO HOSPITALIST  IP CONSULT TO PALLIATIVE CARE  IP CONSULT TO PHARMACY    Disposition:  Home      Condition at Discharge: Stable    Discharge Instructions/Follow-up:    Follow-up with PCP in 1-2 weeks  Follow-up with Nephrology as scheduled  Educated on the importance of compliance with dialysis appointments    Code Status:  Prior FULL    Activity: activity as tolerated    Diet: regular diet      Discharge Medications:     Discharge Medication List as of 3/22/2022  1:36 PM           Details   mineral oil liquid Take 30 mLs by mouth daily as needed for Constipation, Oral, DAILY PRN Starting Wed 3/9/2022, Disp-300 mL, R-0, Normal      sennosides-docusate sodium (SENOKOT-S) 8.6-50 MG tablet Take 1 tablet by mouth daily, Disp-10 tablet, R-0Normal      oxyCODONE-acetaminophen (PERCOCET) 7.5-325 MG per tablet Take 1 tablet by mouth every 6 hours as needed (pain) for up to 30 days. , Disp-120 tablet, R-0Normal cyclobenzaprine (FLEXERIL) 10 MG tablet TAKE 1 TABLET BY MOUTH EVERY 8 HOURS AS NEEDED, Disp-90 tablet, R-2Normal      metoprolol succinate (TOPROL XL) 50 MG extended release tablet Take 1 tablet by mouth in the morning and at bedtime, Disp-60 tablet, R-1Normal      apixaban (ELIQUIS) 5 MG TABS tablet Take 1 tablet by mouth 2 times daily, Disp-60 tablet, R-1Normal      pantoprazole (PROTONIX) 40 MG tablet TAKE 1 TABLET BY MOUTH EVERY MORNING BEFORE BREAKFAST, Disp-30 tablet, R-1Normal      pravastatin (PRAVACHOL) 40 MG tablet Take 1 tablet by mouth daily, Disp-90 tablet, R-3Normal      clopidogrel (PLAVIX) 75 MG tablet Take 1 tablet by mouth daily, Disp-90 tablet, R-3Normal      QUEtiapine (SEROQUEL) 50 MG tablet TAKE 1 TABLET BY MOUTH IN THE EVENING, Disp-30 tablet, R-2Normal      fluticasone (FLONASE) 50 MCG/ACT nasal spray SHAKE LIQUID AND USE 2 SPRAYS IN EACH NOSTRIL DAILY, Disp-48 g, R-0**Patient requests 90 days supply**Normal      insulin glargine (BASAGLAR KWIKPEN) 100 UNIT/ML injection pen Inject 30 Units into the skin nightly, Disp-15 mL, R-1Normal      gabapentin (NEURONTIN) 100 MG capsule TAKE 1-2 CAPSULES BY MOUTH THREE TIMES A DAY, Disp-180 capsule, R-5Normal       MG capsule TAKE 1 CAPSULE BY MOUTH TWICE DAILY, Disp-60 capsule, R-5Normal      Continuous Blood Gluc Sensor (DEXCOM G6 SENSOR) MISC Every 10 days, Disp-9 each, R-3Print      Continuous Blood Gluc Transmit (DEXCOM G6 TRANSMITTER) MISC 1 each by Does not apply route every 3 months, Disp-1 each, R-3Print      Continuous Blood Gluc  (DEXCOM G6 ) ADAM 1 each by Does not apply route Daily with lunch, Disp-1 each, R-0Print      DULoxetine (CYMBALTA) 60 MG extended release capsule TAKE 1 CAPSULE BY MOUTH EVERY DAY, Disp-30 capsule, R-5Normal      traZODone (DESYREL) 150 MG tablet TAKE ONE (1) TABLET BY MOUTH NIGHTLY, Disp-30 tablet, R-10Normal      B Complex-C-Folic Acid (VIRT-CAPS) 1 MG CAPS TAKE 1 CAPSULE BY MOUTH [phone].

## 2022-03-22 NOTE — PROGRESS NOTES
03/22/22 0403   NIV Type   Skin Protection for O2 Device Yes   Location Nose   NIV Started/Stopped On   Equipment Type v60   Mode CPAP   Mask Type Full face mask   Mask Size Medium   Settings/Measurements   CPAP/EPAP 12 cmH2O   Resp 13   FiO2  30 %   Vt Exhaled 502 mL   Minute Volume 6.4 Liters   Comfort Level Good   Using Accessory Muscles No   Alarm Settings   Alarms On Y   Press Low Alarm 6 cmH2O   High Pressure Alarm 30 cmH2O   Delay Alarm 20 sec(s)   Resp Rate Low Alarm 6   High Respiratory Rate 40 br/min   Oxygen Therapy/Pulse Ox   O2 Device PAP (positive airway pressure)

## 2022-03-22 NOTE — PROGRESS NOTES
03/22/22 0021   NIV Type   $NIV $Daily Charge   Skin Protection for O2 Device Yes   Location Nose   NIV Started/Stopped On   Equipment Type v60   Mode CPAP   Mask Type Full face mask   Mask Size Medium   Settings/Measurements   CPAP/EPAP 12 cmH2O   Resp 14   FiO2  30 %   Vt Exhaled 516 mL   Minute Volume 7.1 Liters   Comfort Level Good   Using Accessory Muscles No   SpO2 94   Alarm Settings   Alarms On Y   Press Low Alarm 6 cmH2O   High Pressure Alarm 30 cmH2O   Delay Alarm 20 sec(s)   Resp Rate Low Alarm 6   High Respiratory Rate 40 br/min   Oxygen Therapy/Pulse Ox   O2 Device PAP (positive airway pressure)   SpO2 94 %

## 2022-03-22 NOTE — PROGRESS NOTES
03/21/22 2015   NIV Type   NIV Started/Stopped On   Equipment Type V60   Mode CPAP   Mask Type Full face mask   Mask Size Medium   Settings/Measurements   CPAP/EPAP 12 cmH2O   Resp 18   FiO2  30 %   Vt Exhaled 542 mL   Minute Volume 7.8 Liters   Mask Leak (lpm) 44 lpm   Comfort Level Good   Using Accessory Muscles No   Alarm Settings   Alarms On Y

## 2022-03-22 NOTE — PROGRESS NOTES
Oxygen documentation:     O2 saturation at REST on ROOM AIR = 99______%     *If saturation is 89% or above please proceed with steps 2 and 3.      O2 saturation with AMBULATION of __25___ feet on ROOM AIR = __94___%   O2 saturation with AMBULATION on current liter flow = ______%     DCP notified: ___Y___     Electronically signed by Igor Sosa RN on 3/22/2022 at 1:18 PM

## 2022-03-22 NOTE — PROGRESS NOTES
Pt d/c'd home with [facility]. Removed peripheral IV and stopped bleeding. Catheter intact. Pt tolerated well. No redness noted at site. Notified CMU and removed tele box. Reviewed d/c instructions, home meds, and  f/u information utilizing teach-back method. No new scripts Patient verbalized understanding.  Electronically signed by Donnie Gatica RN on 3/22/2022 at 2:37 PM

## 2022-03-23 ENCOUNTER — FOLLOWUP TELEPHONE ENCOUNTER (OUTPATIENT)
Dept: TELEMETRY | Age: 52
End: 2022-03-23

## 2022-03-23 ENCOUNTER — CARE COORDINATION (OUTPATIENT)
Dept: CASE MANAGEMENT | Age: 52
End: 2022-03-23

## 2022-03-23 NOTE — TELEPHONE ENCOUNTER
First attempt to contact pt for hospital follow up. VM box full and unable to leave message.  Shi Yousif RN

## 2022-03-23 NOTE — CARE COORDINATION
[facility] Transitions Initial Follow Up Call    Call within 2 business days of discharge: Yes    Patient: Favio Weston Patient : 1970   MRN: [id number]  Reason for Admission: SOB ESRD  Discharge Date: 3/22/22 RARS: Readmission Risk Score: 47.7 ( )      Last Discharge [facility]       Complaint Diagnosis Description Type Department Provider    3/17/22 Shortness of Breath ESRD (end stage renal disease) ([facility] [address] [facility] . .. ED to Hosp-Admission (Discharged) (ADMITTED) Shey Nguyen MD; Manny Cohen. .. Attempted to reach patient via phone for initial post hospital transition call. VM left stating purpose of call along with my contact information requesting a return call.           Care Transitions 24 Hour Call    Do you have all of your prescriptions and are they filled?: Yes  Post Acute Services:  [facility] Transitions Interventions         Follow Up  Future Appointments   Date Time Provider Nelly Darby   3/24/2022  3:00 PM JAY Claire - ILAN RAY   3/31/2022  1:40 PM Vazquez Shukla MD AND PULHOMERO RALPH, RN, [facility]  Care Transition Nurse  [phone] mobile

## 2022-03-24 ENCOUNTER — CARE COORDINATION (OUTPATIENT)
Dept: CASE MANAGEMENT | Age: 52
End: 2022-03-24

## 2022-03-24 NOTE — CARE COORDINATION
[facility] Transitions Initial Follow Up Call    Call within 2 business days of discharge: Yes    Patient: Clarita Rodriguez Patient : 1970   MRN: [id number]  Reason for Admission: SOB   Discharge Date: 3/22/22 RARS: Readmission Risk Score: 47.7 ( )      Last Discharge [facility]       Complaint Diagnosis Description Type Department Provider    3/17/22 Shortness of Breath ESRD (end stage renal disease) ([facility] [address] . .. ED to Hosp-Admission (Discharged) (ADMITTED) Karlos Cristina MD; Alex Moore. .. Final attempt made to reach patient for post hospital follow up call. Left a voice message for patient with my contact information and informed of final outreach attempt.            Care Transitions 24 Hour Call    Do you have all of your prescriptions and are they filled?: Yes  Care Transitions Interventions         Follow Up  Future Appointments   Date Time Provider Nelly Darby   3/24/2022  3:00 PM JAY Ladd - CNP [facility]   3/31/2022  1:40 PM Rajinder Dumas MD AND [facility][1]       Nahid RALPH, RN, [facility]  Care Transition Nurse  [phone] mobile

## 2022-03-25 ENCOUNTER — FOLLOWUP TELEPHONE ENCOUNTER (OUTPATIENT)
Dept: TELEMETRY | Age: 52
End: 2022-03-25

## 2022-03-25 ENCOUNTER — CARE COORDINATION (OUTPATIENT)
Dept: CARE COORDINATION | Age: 52
End: 2022-03-25

## 2022-03-25 NOTE — TELEPHONE ENCOUNTER
2nd Attempt; No Answer- Left HIPAA compliant voicemail with Non-Urgent Heart Failure Resource Line number for call back.        Martha Smiley RN

## 2022-03-25 NOTE — TELEPHONE ENCOUNTER
Third and final attempt at hospital follow up. No answer and VM box full. Unable to leave message.  Neela Montelongo RN

## 2022-03-25 NOTE — TELEPHONE ENCOUNTER
This patient has completed care transitions. Will refer to Laurence Boone for ongoing CM needs. Thank you both for the collaboration and team work!     Roro Singer, OMARN, RN  Manager Care Coordination  [phone]

## 2022-03-25 NOTE — CARE COORDINATION
ACM attempted to contact patient for enrollment, per CTN referral.   Patient UTR. VM containing ACM contact information left. Plan to follow up at a later date.

## 2022-03-28 ENCOUNTER — CARE COORDINATION (OUTPATIENT)
Dept: CARE COORDINATION | Age: 52
End: 2022-03-28

## 2022-03-28 NOTE — CARE COORDINATION
ACM attempted second outreach to patient per CTN referral with no success. Patient VM box is full and unable to leave message. Patient also does not have mychart to send a message. ACM has removed herself from care team at this time due to unable to reach.

## 2022-03-29 DIAGNOSIS — F32.A DEPRESSION, UNSPECIFIED DEPRESSION TYPE: ICD-10-CM

## 2022-03-29 RX ORDER — TIZANIDINE 4 MG/1
TABLET ORAL
Qty: 90 TABLET | Refills: 10 | Status: ON HOLD | OUTPATIENT
Start: 2022-03-29 | End: 2022-06-01 | Stop reason: HOSPADM

## 2022-03-29 RX ORDER — DULOXETIN HYDROCHLORIDE 30 MG/1
CAPSULE, DELAYED RELEASE ORAL
Qty: 30 CAPSULE | Refills: 10 | Status: SHIPPED | OUTPATIENT
Start: 2022-03-29

## 2022-03-29 NOTE — TELEPHONE ENCOUNTER
Last Office Visit  -  1/18/22  Next Office Visit  -  n/a    Last Filled  -    Last UDS -    Contract -

## 2022-03-30 DIAGNOSIS — G89.4 CHRONIC PAIN SYNDROME: ICD-10-CM

## 2022-03-30 RX ORDER — OXYCODONE AND ACETAMINOPHEN 7.5; 325 MG/1; MG/1
1 TABLET ORAL EVERY 6 HOURS PRN
Qty: 120 TABLET | Refills: 0 | OUTPATIENT
Start: 2022-03-30 | End: 2022-04-29

## 2022-03-30 NOTE — TELEPHONE ENCOUNTER
Last Office Visit  -  1/18/22  Next Office Visit  -  n/a    Last Filled  -  3/7/22  Last UDS -  n/a  Contract -  N/a

## 2022-03-30 NOTE — TELEPHONE ENCOUNTER
Last Office Visit  -  1-  Next Office Visit  -      Last Filled  -    Last UDS -    Contract -      Refill oxyCODONE-acetaminophen (PERCOCET) 7.5-325 MG per tablet #120    pharmacy  [facility] #[id number] - [address] [address] [phone] - f [phone]

## 2022-03-31 ENCOUNTER — TELEPHONE (OUTPATIENT)
Dept: PULMONOLOGY | Age: 52
End: 2022-03-31

## 2022-04-02 DIAGNOSIS — F32.A DEPRESSION, UNSPECIFIED DEPRESSION TYPE: ICD-10-CM

## 2022-04-04 RX ORDER — DULOXETIN HYDROCHLORIDE 60 MG/1
CAPSULE, DELAYED RELEASE ORAL
Qty: 30 CAPSULE | Refills: 5 | Status: ON HOLD | OUTPATIENT
Start: 2022-04-04 | End: 2022-04-27 | Stop reason: HOSPADM

## 2022-04-04 NOTE — PROGRESS NOTES
Physician Progress Note      PATIENT:               Stiven Fierro  CSN #:                  [id number]  :                       1970  ADMIT DATE:       3/17/2022 12:41 PM  [address] DATE:        3/22/2022 2:00 PM  RESPONDING  PROVIDER #:        Krystian Weber MD          QUERY TEXT:    Patient admitted after missed dialysis, concern for correction of HD access   site, Nephrology confirmed nonfunctioning LIJ TDC. If possible, please   document in the progress notes and discharge summary if nonfunctional LIJ TDC   was: The medical record reflects the following:    Risk Factors: ESRD    Clinical Indicators: H/P: -[place] Sides Dr. Ravinder Abraham aware of dialysis catheter   malfunction and need for urgent dialysis session    Dr Ravinder Abraham 3/17: Non-functioning LIJ TDC.  - Will consult IR for replacement in am.    Treatment: removal and replacement of tunneled dialysis catheter 3/18/22    Thank you,  [email or web address]  Options provided:  -- Nonfunctioning LIJ TDC confirmed, POA  -- Nonfunctioning LIJ TDC ruled out after study  -- Other - I will add my own diagnosis  -- Disagree - Not applicable / Not valid  -- Disagree - Clinically unable to determine / Unknown  -- Refer to Clinical Documentation Reviewer    PROVIDER RESPONSE TEXT:    Provider is clinically unable to determine a response to this query. Query created by:  Abby Wooten on 2022 4:24 PM      Electronically signed by:  Krystian Weber MD 2022 5:02 PM

## 2022-04-05 NOTE — PROGRESS NOTES
Physician Progress Note      PATIENT:               Stiven Fierro  CSN #:                  [id number]  :                       1970  ADMIT DATE:       3/17/2022 12:41 PM  [address] DATE:        3/22/2022 2:00 PM  RESPONDING  PROVIDER #:        Krystian Weber MD          QUERY TEXT:    Patient admitted after missed dialysis, concern for correction of HD access   site, Nephrology confirmed nonfunctioning LIJ TDC. If possible, please   document in the progress notes and discharge summary if nonfunctional LIJ TDC   was: The medical record reflects the following:    Risk Factors: ESRD    Clinical Indicators: H/P: -[place] Sides Dr. Ravinder Abraham aware of dialysis catheter   malfunction and need for urgent dialysis session    Dr Ravinder Abraham 3/17: Non-functioning LIJ TDC.  - Will consult IR for replacement in am.    Treatment: removal and replacement of tunneled dialysis catheter 3/18/22    Thank you,  [email or web address]  Options provided:  -- Nonfunctioning LIJ TDC confirmed, POA  -- Nonfunctioning LIJ TDC ruled out after study  -- Other - I will add my own diagnosis  -- Disagree - Not applicable / Not valid  -- Disagree - Clinically unable to determine / Unknown  -- Refer to Clinical Documentation Reviewer    PROVIDER RESPONSE TEXT:    Nonfunctioning LIJ TDC confirmed, POA. Query created by:  Abby Wooten on 2022 2:52 PM      Electronically signed by:  Krystian Weber MD 2022 3:16 PM

## 2022-04-06 DIAGNOSIS — G89.4 CHRONIC PAIN SYNDROME: ICD-10-CM

## 2022-04-06 RX ORDER — OXYCODONE AND ACETAMINOPHEN 7.5; 325 MG/1; MG/1
1 TABLET ORAL EVERY 6 HOURS PRN
Qty: 120 TABLET | Refills: 0 | Status: SHIPPED | OUTPATIENT
Start: 2022-04-06 | End: 2022-05-04 | Stop reason: SDUPTHER

## 2022-04-06 NOTE — TELEPHONE ENCOUNTER
Received call from Pt.  Requesting oxyCODONE-acetaminophen (PERCOCET) 7.5-325 MG per tablet    Last Office Visit  -  1/21/22  Next Office Visit  -  None

## 2022-04-16 DIAGNOSIS — R09.81 NASAL CONGESTION: ICD-10-CM

## 2022-04-17 RX ORDER — FLUTICASONE PROPIONATE 50 MCG
SPRAY, SUSPENSION (ML) NASAL
Qty: 48 G | Refills: 0 | Status: ON HOLD | OUTPATIENT
Start: 2022-04-17 | End: 2022-06-28 | Stop reason: HOSPADM

## 2022-04-18 ENCOUNTER — HOSPITAL ENCOUNTER (INPATIENT)
Age: 52
LOS: 9 days | Discharge: HOME HEALTH | DRG: 291 | End: 2022-04-27
Attending: EMERGENCY MEDICINE | Admitting: INTERNAL MEDICINE
Payer: COMMERCIAL

## 2022-04-18 ENCOUNTER — APPOINTMENT (OUTPATIENT)
Dept: GENERAL RADIOLOGY | Age: 52
DRG: 291 | End: 2022-04-18
Payer: COMMERCIAL

## 2022-04-18 ENCOUNTER — NURSE TRIAGE (OUTPATIENT)
Dept: OTHER | Facility: CLINIC | Age: 52
End: 2022-04-18

## 2022-04-18 ENCOUNTER — TELEPHONE (OUTPATIENT)
Dept: OTHER | Facility: CLINIC | Age: 52
End: 2022-04-18

## 2022-04-18 DIAGNOSIS — J96.00 ACUTE RESPIRATORY FAILURE, UNSPECIFIED WHETHER WITH HYPOXIA OR HYPERCAPNIA (HCC): Primary | ICD-10-CM

## 2022-04-18 DIAGNOSIS — N18.6 ESRD (END STAGE RENAL DISEASE) (HCC): ICD-10-CM

## 2022-04-18 DIAGNOSIS — E11.21 TYPE 2 DIABETES MELLITUS WITH DIABETIC NEPHROPATHY, WITH LONG-TERM CURRENT USE OF INSULIN (HCC): ICD-10-CM

## 2022-04-18 DIAGNOSIS — Z79.4 TYPE 2 DIABETES MELLITUS WITH DIABETIC NEPHROPATHY, WITH LONG-TERM CURRENT USE OF INSULIN (HCC): ICD-10-CM

## 2022-04-18 PROBLEM — J96.90 RESPIRATORY FAILURE (HCC): Status: ACTIVE | Noted: 2022-04-18

## 2022-04-18 LAB
A/G RATIO: 1.3 (ref 1.1–2.2)
ALBUMIN SERPL-MCNC: 3.9 G/DL (ref 3.4–5)
ALP BLD-CCNC: 109 U/L (ref 40–129)
ALT SERPL-CCNC: 9 U/L (ref 10–40)
ANION GAP SERPL CALCULATED.3IONS-SCNC: 17 MMOL/L (ref 3–16)
AST SERPL-CCNC: 12 U/L (ref 15–37)
BASE EXCESS ARTERIAL: -6.1 MMOL/L (ref -3–3)
BASE EXCESS VENOUS: -7.6 MMOL/L (ref -3–3)
BASOPHILS ABSOLUTE: 0.1 K/UL (ref 0–0.2)
BASOPHILS RELATIVE PERCENT: 0.7 %
BILIRUB SERPL-MCNC: <0.2 MG/DL (ref 0–1)
BUN BLDV-MCNC: 85 MG/DL (ref 7–20)
CALCIUM SERPL-MCNC: 9.2 MG/DL (ref 8.3–10.6)
CARBOXYHEMOGLOBIN ARTERIAL: 0.1 % (ref 0–1.5)
CARBOXYHEMOGLOBIN: 1.9 % (ref 0–1.5)
CHLORIDE BLD-SCNC: 95 MMOL/L (ref 99–110)
CO2: 19 MMOL/L (ref 21–32)
CREAT SERPL-MCNC: 10.9 MG/DL (ref 0.9–1.3)
EKG ATRIAL RATE: 109 BPM
EKG DIAGNOSIS: NORMAL
EKG P AXIS: 82 DEGREES
EKG P-R INTERVAL: 166 MS
EKG Q-T INTERVAL: 340 MS
EKG QRS DURATION: 92 MS
EKG QTC CALCULATION (BAZETT): 457 MS
EKG R AXIS: 26 DEGREES
EKG T AXIS: 82 DEGREES
EKG VENTRICULAR RATE: 109 BPM
EOSINOPHILS ABSOLUTE: 0.2 K/UL (ref 0–0.6)
EOSINOPHILS RELATIVE PERCENT: 2 %
GFR AFRICAN AMERICAN: 6
GFR NON-AFRICAN AMERICAN: 5
GLUCOSE BLD-MCNC: 194 MG/DL (ref 70–99)
GLUCOSE BLD-MCNC: 194 MG/DL (ref 70–99)
HCO3 ARTERIAL: 19.6 MMOL/L (ref 21–29)
HCO3 VENOUS: 18.8 MMOL/L (ref 23–29)
HCT VFR BLD CALC: 34.5 % (ref 40.5–52.5)
HEMOGLOBIN, ART, EXTENDED: 11 G/DL (ref 13.5–17.5)
HEMOGLOBIN: 11.2 G/DL (ref 13.5–17.5)
LACTIC ACID, SEPSIS: 1.3 MMOL/L (ref 0.4–1.9)
LYMPHOCYTES ABSOLUTE: 0.8 K/UL (ref 1–5.1)
LYMPHOCYTES RELATIVE PERCENT: 6.3 %
MCH RBC QN AUTO: 29.8 PG (ref 26–34)
MCHC RBC AUTO-ENTMCNC: 32.4 G/DL (ref 31–36)
MCV RBC AUTO: 92.1 FL (ref 80–100)
METHEMOGLOBIN ARTERIAL: 0.5 %
METHEMOGLOBIN VENOUS: 0.2 %
MONOCYTES ABSOLUTE: 0.7 K/UL (ref 0–1.3)
MONOCYTES RELATIVE PERCENT: 5.6 %
NEUTROPHILS ABSOLUTE: 10.5 K/UL (ref 1.7–7.7)
NEUTROPHILS RELATIVE PERCENT: 85.4 %
O2 SAT, ARTERIAL: 75.4 %
O2 SAT, VEN: 83 %
O2 THERAPY: ABNORMAL
O2 THERAPY: ABNORMAL
PCO2 ARTERIAL: 39.6 MMHG (ref 35–45)
PCO2, VEN: 41.2 MMHG (ref 40–50)
PDW BLD-RTO: 16.8 % (ref 12.4–15.4)
PERFORMED ON: ABNORMAL
PH ARTERIAL: 7.31 (ref 7.35–7.45)
PH VENOUS: 7.28 (ref 7.35–7.45)
PLATELET # BLD: 271 K/UL (ref 135–450)
PMV BLD AUTO: 8.3 FL (ref 5–10.5)
PO2 ARTERIAL: 44.1 MMHG (ref 75–108)
PO2, VEN: 55.1 MMHG (ref 25–40)
POTASSIUM REFLEX MAGNESIUM: 4.1 MMOL/L (ref 3.5–5.1)
PRO-BNP: ABNORMAL PG/ML (ref 0–124)
PROCALCITONIN: 0.39 NG/ML (ref 0–0.15)
RBC # BLD: 3.75 M/UL (ref 4.2–5.9)
SARS-COV-2, NAAT: NOT DETECTED
SODIUM BLD-SCNC: 131 MMOL/L (ref 136–145)
TCO2 ARTERIAL: 20.8 MMOL/L
TCO2 CALC VENOUS: 20 MMOL/L
TOTAL PROTEIN: 7 G/DL (ref 6.4–8.2)
TROPONIN: 0.17 NG/ML
TROPONIN: 0.19 NG/ML
WBC # BLD: 12.3 K/UL (ref 4–11)

## 2022-04-18 PROCEDURE — 2060000000 HC ICU INTERMEDIATE R&B

## 2022-04-18 PROCEDURE — 84145 PROCALCITONIN (PCT): CPT

## 2022-04-18 PROCEDURE — 87635 SARS-COV-2 COVID-19 AMP PRB: CPT

## 2022-04-18 PROCEDURE — 2500000003 HC RX 250 WO HCPCS: Performed by: NURSE PRACTITIONER

## 2022-04-18 PROCEDURE — 2580000003 HC RX 258: Performed by: EMERGENCY MEDICINE

## 2022-04-18 PROCEDURE — 96374 THER/PROPH/DIAG INJ IV PUSH: CPT

## 2022-04-18 PROCEDURE — 6360000002 HC RX W HCPCS: Performed by: INTERNAL MEDICINE

## 2022-04-18 PROCEDURE — 6370000000 HC RX 637 (ALT 250 FOR IP): Performed by: INTERNAL MEDICINE

## 2022-04-18 PROCEDURE — 83036 HEMOGLOBIN GLYCOSYLATED A1C: CPT

## 2022-04-18 PROCEDURE — 82803 BLOOD GASES ANY COMBINATION: CPT

## 2022-04-18 PROCEDURE — 93005 ELECTROCARDIOGRAM TRACING: CPT | Performed by: EMERGENCY MEDICINE

## 2022-04-18 PROCEDURE — 6360000002 HC RX W HCPCS: Performed by: EMERGENCY MEDICINE

## 2022-04-18 PROCEDURE — 83880 ASSAY OF NATRIURETIC PEPTIDE: CPT

## 2022-04-18 PROCEDURE — 80053 COMPREHEN METABOLIC PANEL: CPT

## 2022-04-18 PROCEDURE — 2580000003 HC RX 258: Performed by: INTERNAL MEDICINE

## 2022-04-18 PROCEDURE — 71045 X-RAY EXAM CHEST 1 VIEW: CPT

## 2022-04-18 PROCEDURE — 83605 ASSAY OF LACTIC ACID: CPT

## 2022-04-18 PROCEDURE — 87040 BLOOD CULTURE FOR BACTERIA: CPT

## 2022-04-18 PROCEDURE — 85025 COMPLETE CBC W/AUTO DIFF WBC: CPT

## 2022-04-18 PROCEDURE — 99285 EMERGENCY DEPT VISIT HI MDM: CPT

## 2022-04-18 PROCEDURE — 36600 WITHDRAWAL OF ARTERIAL BLOOD: CPT

## 2022-04-18 PROCEDURE — 84484 ASSAY OF TROPONIN QUANT: CPT

## 2022-04-18 PROCEDURE — 36415 COLL VENOUS BLD VENIPUNCTURE: CPT

## 2022-04-18 PROCEDURE — 94640 AIRWAY INHALATION TREATMENT: CPT

## 2022-04-18 PROCEDURE — 93010 ELECTROCARDIOGRAM REPORT: CPT | Performed by: INTERNAL MEDICINE

## 2022-04-18 RX ORDER — METOPROLOL SUCCINATE 50 MG/1
50 TABLET, EXTENDED RELEASE ORAL 2 TIMES DAILY
Status: DISCONTINUED | OUTPATIENT
Start: 2022-04-18 | End: 2022-04-27 | Stop reason: HOSPADM

## 2022-04-18 RX ORDER — METOPROLOL TARTRATE 5 MG/5ML
5 INJECTION INTRAVENOUS ONCE
Status: COMPLETED | OUTPATIENT
Start: 2022-04-18 | End: 2022-04-18

## 2022-04-18 RX ORDER — IPRATROPIUM BROMIDE AND ALBUTEROL SULFATE 2.5; .5 MG/3ML; MG/3ML
1 SOLUTION RESPIRATORY (INHALATION) EVERY 4 HOURS
Status: DISCONTINUED | OUTPATIENT
Start: 2022-04-18 | End: 2022-04-19

## 2022-04-18 RX ORDER — SODIUM CHLORIDE 0.9 % (FLUSH) 0.9 %
5-40 SYRINGE (ML) INJECTION EVERY 12 HOURS SCHEDULED
Status: DISCONTINUED | OUTPATIENT
Start: 2022-04-18 | End: 2022-04-27 | Stop reason: HOSPADM

## 2022-04-18 RX ORDER — DOCUSATE SODIUM 100 MG/1
100 CAPSULE, LIQUID FILLED ORAL DAILY
Status: DISCONTINUED | OUTPATIENT
Start: 2022-04-18 | End: 2022-04-27 | Stop reason: HOSPADM

## 2022-04-18 RX ORDER — POLYETHYLENE GLYCOL 3350 17 G/17G
17 POWDER, FOR SOLUTION ORAL DAILY PRN
Status: DISCONTINUED | OUTPATIENT
Start: 2022-04-18 | End: 2022-04-27 | Stop reason: HOSPADM

## 2022-04-18 RX ORDER — CLOPIDOGREL BISULFATE 75 MG/1
75 TABLET ORAL DAILY
Status: DISCONTINUED | OUTPATIENT
Start: 2022-04-18 | End: 2022-04-27 | Stop reason: HOSPADM

## 2022-04-18 RX ORDER — SODIUM CHLORIDE 9 MG/ML
INJECTION, SOLUTION INTRAVENOUS PRN
Status: DISCONTINUED | OUTPATIENT
Start: 2022-04-18 | End: 2022-04-27 | Stop reason: HOSPADM

## 2022-04-18 RX ORDER — QUETIAPINE FUMARATE 25 MG/1
50 TABLET, FILM COATED ORAL NIGHTLY
Status: DISCONTINUED | OUTPATIENT
Start: 2022-04-18 | End: 2022-04-20

## 2022-04-18 RX ORDER — ACETAMINOPHEN 325 MG/1
650 TABLET ORAL EVERY 6 HOURS PRN
Status: DISCONTINUED | OUTPATIENT
Start: 2022-04-18 | End: 2022-04-27 | Stop reason: HOSPADM

## 2022-04-18 RX ORDER — PANTOPRAZOLE SODIUM 40 MG/1
40 TABLET, DELAYED RELEASE ORAL
Status: DISCONTINUED | OUTPATIENT
Start: 2022-04-19 | End: 2022-04-27 | Stop reason: HOSPADM

## 2022-04-18 RX ORDER — FUROSEMIDE 10 MG/ML
60 INJECTION INTRAMUSCULAR; INTRAVENOUS ONCE
Status: COMPLETED | OUTPATIENT
Start: 2022-04-18 | End: 2022-04-18

## 2022-04-18 RX ORDER — HEPARIN SODIUM 1000 [USP'U]/ML
3600 INJECTION, SOLUTION INTRAVENOUS; SUBCUTANEOUS PRN
Status: DISPENSED | OUTPATIENT
Start: 2022-04-18 | End: 2022-04-19

## 2022-04-18 RX ORDER — ONDANSETRON 2 MG/ML
4 INJECTION INTRAMUSCULAR; INTRAVENOUS EVERY 6 HOURS PRN
Status: DISCONTINUED | OUTPATIENT
Start: 2022-04-18 | End: 2022-04-27 | Stop reason: HOSPADM

## 2022-04-18 RX ORDER — PRAVASTATIN SODIUM 40 MG
40 TABLET ORAL DAILY
Status: DISCONTINUED | OUTPATIENT
Start: 2022-04-18 | End: 2022-04-27 | Stop reason: HOSPADM

## 2022-04-18 RX ORDER — SODIUM CHLORIDE 0.9 % (FLUSH) 0.9 %
5-40 SYRINGE (ML) INJECTION PRN
Status: DISCONTINUED | OUTPATIENT
Start: 2022-04-18 | End: 2022-04-27 | Stop reason: HOSPADM

## 2022-04-18 RX ORDER — DULOXETIN HYDROCHLORIDE 30 MG/1
30 CAPSULE, DELAYED RELEASE ORAL DAILY
Status: DISCONTINUED | OUTPATIENT
Start: 2022-04-18 | End: 2022-04-27 | Stop reason: HOSPADM

## 2022-04-18 RX ORDER — FUROSEMIDE 10 MG/ML
20 INJECTION INTRAMUSCULAR; INTRAVENOUS ONCE
Status: COMPLETED | OUTPATIENT
Start: 2022-04-18 | End: 2022-04-18

## 2022-04-18 RX ORDER — GABAPENTIN 100 MG/1
100 CAPSULE ORAL 3 TIMES DAILY
Status: DISCONTINUED | OUTPATIENT
Start: 2022-04-18 | End: 2022-04-27 | Stop reason: HOSPADM

## 2022-04-18 RX ORDER — ACETAMINOPHEN 650 MG/1
650 SUPPOSITORY RECTAL EVERY 6 HOURS PRN
Status: DISCONTINUED | OUTPATIENT
Start: 2022-04-18 | End: 2022-04-27 | Stop reason: HOSPADM

## 2022-04-18 RX ORDER — ONDANSETRON 4 MG/1
4 TABLET, ORALLY DISINTEGRATING ORAL EVERY 8 HOURS PRN
Status: DISCONTINUED | OUTPATIENT
Start: 2022-04-18 | End: 2022-04-27 | Stop reason: HOSPADM

## 2022-04-18 RX ADMIN — ALTEPLASE 2 MG: 2.2 INJECTION, POWDER, LYOPHILIZED, FOR SOLUTION INTRAVENOUS at 23:41

## 2022-04-18 RX ADMIN — FUROSEMIDE 20 MG: 10 INJECTION, SOLUTION INTRAMUSCULAR; INTRAVENOUS at 19:01

## 2022-04-18 RX ADMIN — VANCOMYCIN HYDROCHLORIDE 1000 MG: 1 INJECTION, POWDER, LYOPHILIZED, FOR SOLUTION INTRAVENOUS at 16:17

## 2022-04-18 RX ADMIN — FUROSEMIDE 60 MG: 10 INJECTION, SOLUTION INTRAMUSCULAR; INTRAVENOUS at 13:09

## 2022-04-18 RX ADMIN — IPRATROPIUM BROMIDE AND ALBUTEROL SULFATE 3 ML: .5; 2.5 SOLUTION RESPIRATORY (INHALATION) at 19:27

## 2022-04-18 RX ADMIN — METOPROLOL TARTRATE 5 MG: 5 INJECTION INTRAVENOUS at 20:41

## 2022-04-18 RX ADMIN — SODIUM CHLORIDE, PRESERVATIVE FREE 10 ML: 5 INJECTION INTRAVENOUS at 20:21

## 2022-04-18 RX ADMIN — ALBUTEROL SULFATE 7.5 MG: 2.5 SOLUTION RESPIRATORY (INHALATION) at 13:39

## 2022-04-18 RX ADMIN — CEFEPIME 1000 MG: 1 INJECTION, POWDER, FOR SOLUTION INTRAMUSCULAR; INTRAVENOUS at 15:33

## 2022-04-18 RX ADMIN — IPRATROPIUM BROMIDE AND ALBUTEROL SULFATE 3 ML: .5; 2.5 SOLUTION RESPIRATORY (INHALATION) at 23:50

## 2022-04-18 ASSESSMENT — PAIN SCALES - GENERAL
PAINLEVEL_OUTOF10: 0

## 2022-04-18 NOTE — CONSULTS
Pharmacy to Dose: Vancomycin; Pneumonia (HAP) maxipime      Pharmacy Note  Vancomycin Consult  Dx: HAP    Allergies:  Morphine     Recent Labs     04/18/22  1252   CREATININE 10.9*       Recent Labs     04/18/22  1252   WBC 12.3*       Estimated Creatinine Clearance: 9 mL/min (A) (based on SCr of 10.9 mg/dL [facility])). Pulse dose: Hx of ESRD   Goal Vancomycin trough: 15 mcg/mL      Assessment/Plan:  Vancomycin 1000mg x 1 given at 1617  Vancomycin level ordered with AM labs    Cefepime 1000mg x 1 given at 1533  Start cefepime 1000mg Q24H     Thank you for the consult. Will continue to follow. Brett Richardson PharmD 4/18/2022 4:49 PM       Vanc level = 7.9 at [facility]. Pt received HD today. Give Vancomycin 500mg IVPB x 1.  Vanc level ordered for 4/20 0600  Hannah Rodriguez[address]  4/19/2022 at 8:32 PM

## 2022-04-18 NOTE — TELEPHONE ENCOUNTER
Received call from Trang Samayoa at [facility] with [employer]. Subjective: Caller states \"having trouble breathing, I feel like I can't get air into my lungs. When I lay down I can't breath at all. This started last night. \"     Current Symptoms: Shortness Breath     Onset: 1 day ago; worsening    Associated Symptoms: NA    Pain Severity: 0/10; N/A; none     Temperature: Feel hot when touching forhead by unknown method    What has been tried: Nebulizer    LMP: NA Pregnant: NA    Recommended disposition: Call  Now. Advised to call back with new or worsening symptoms     Care advice provided, patient verbalizes understanding; denies any other questions or concerns; instructed to call back for any new or worsening symptoms. Patient/caller agrees to calling 911     Attention Provider: Thank you for allowing me to participate in the care of your patient. The patient was connected to triage in response to information provided to the ECC/PSC. Please do not respond through this encounter as the response is not directed to a shared pool.           Reason for Disposition   Slow, shallow and weak breathing    Protocols used: BREATHING DIFFICULTY-ADULT-OH

## 2022-04-18 NOTE — PROGRESS NOTES
04/18/22 1624   NIV Type   Equipment Type v60   Mode Bilevel   Mask Type Full face mask   Mask Size Large   Settings/Measurements   IPAP 18 cmH20   CPAP/EPAP 6 cmH2O   Rate Ordered 8   Resp 16   FiO2  80 %   Vt Exhaled 931 mL   Minute Volume 15.6 Liters   Mask Leak (lpm) 10 lpm   Comfort Level Good   Using Accessory Muscles Yes   Breath Sounds   Right Upper Lobe Diminished   Right Middle Lobe Crackles   Right Lower Lobe Diminished   Left Upper Lobe Diminished   Left Lower Lobe Crackles   Alarm Settings   Alarms On Y   Press Low Alarm 6 cmH2O   High Pressure Alarm 30 cmH2O   Resp Rate Low Alarm 6   High Respiratory Rate 40 br/min

## 2022-04-18 NOTE — PROGRESS NOTES
4 Eyes Skin Assessment     The patient is being assess for   Admission    I agree that 2 RN's have performed a thorough Head to Toe Skin Assessment on the patient. ALL assessment sites listed below have been assessed. Areas assessed for pressure by both nurses:   [x]   Head, Face, and Ears   [x]   Shoulders, Back, and Chest, Abdomen  [x]   Arms, Elbows, and Hands   [x]   Coccyx, Sacrum, and Ischium  [x]   Legs, Feet, and Heels        Skin Assessed Under all Medical Devices by both nurses:  Bipap mask              All [employer] Borders were peeled back and area peeked at by both nurses:  No: n/a  Please list where M[employer] Borders are located: n/a             **SHARE this note so that the co-signing nurse is able to place an eSignature**    Co-signer eSignature: Electronically signed by Isidro Crain RN on 4/18/22 at 7:54 PM EDT    Does the Patient have Skin Breakdown related to pressure?   No     (Insert Photo here)         Isaac Prevention initiated:  Yes   Wound Care Orders initiated:  No      C nurse consulted for Pressure Injury (Stage 3,4, Unstageable, DTI, NWPT, Complex wounds)and New or Established Ostomies:  NA      Primary Nurse eSignature: Electronically signed by Koffi Duff RN on 4/18/22 at 7:08 PM EDT

## 2022-04-18 NOTE — PROGRESS NOTES
04/18/22 1226   NIV Type   $NIV $Daily Charge   NIV Started/Stopped On   Equipment Type v60   Mode Bilevel   Mask Type Full face mask   Mask Size Large   Settings/Measurements   IPAP 18 cmH20   CPAP/EPAP 6 cmH2O   Rate Ordered 8   Resp 18   FiO2  80 %   Vt Exhaled 1041 mL   Minute Volume 17.4 Liters   Mask Leak (lpm) 41 lpm   Comfort Level Good   Using Accessory Muscles Yes   Alarm Settings   Alarms On Y   Press Low Alarm 6 cmH2O   High Pressure Alarm 30 cmH2O   Resp Rate Low Alarm 6   High Respiratory Rate 40 br/min

## 2022-04-18 NOTE — FLOWSHEET NOTE
04/18/22 1645   Vital Signs   Temp 98.6 °F (37 °C)   Temp Source Axillary   Pulse 98   Heart Rate Source Monitor   Resp 15   BP (!) 175/111   BP Location Right upper arm   MAP (mmHg) 133   Patient Position Lying left side;Semi fowlers   Level of Consciousness Alert (0)   MEWS Score 1   Patient Currently in Pain Denies   Pain Assessment   Pain Assessment 0-10   Pain Level 0   Patient's Stated Pain Goal No pain   Oxygen Therapy   SpO2 100 %   Pulse Oximeter Device Mode Intermittent   Pulse Oximeter Device Location Finger   O2 Device PAP (positive airway pressure)   Skin Assessment Clean, dry, & intact   FiO2  80 %   Height and Weight   Height 5' 6\" (1.676 m)   Weight 250 lb 11.2 oz (113.7 kg)   Weight Method Bed scale   BSA (Calculated - sq m) 2.3 sq meters   BMI (Calculated) 40.5   Patient Observation   Observations admission   Patient Alert on Bipap scattered scabs. Conts Bipap. All needs addressed.

## 2022-04-18 NOTE — ED NOTES
Called kidney & HTN consult @[phone]  Re: existing pt of ; volume overload, on bipap per [email or web address] returned [phone]  04/18/22 [id number]

## 2022-04-18 NOTE — ED PROVIDER NOTES
Emergency Department Provider Note  Location: [address] [place], [place] [address]  4/18/2022     Patient Identification  Ryley Turner is a 45 y.o. male    Chief Complaint  Shortness of Breath (started this morning and getting worse. Due for dialysis today. Pt placed on bipap per EMS)          HPI  (History provided by patient and EMS)  Patient is a 70-year-old male multiple comorbidities including end-stage renal disease due for dialysis today last dialyzed on Friday, CAD CHF COPD aortic stenosis, frequent admissions, who presents with shortness of breath. Patient reports feels short of breath. Denies chest pain. No exacerbating relieving factors. Denies any cough or sputum production. Felt hot yesterday otherwise no fevers or chills. EMS was called for and feeling short of breath found on hypoxic 80s which did not improve with nasal cannula and he had increased work of breathing so he was placed on CPAP. Arrives into a resuscitation bay alert oriented mild respiratory distress. Transfer to BiPAP she is tolerating well. Patient does produce urine. 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), CAD (coronary artery disease), Cardiomyopathy ([address] (04/19/2019), CHF (congestive heart failure) ([address] Chronic pain, COPD (chronic obstructive pulmonary disease) ([address] Depression, Diabetes mellitus ([address] Difficult intravenous access, Emphysema of lung ([address] ESRD (end stage renal disease) on dialysis ([address] Fear of needles, Gastric ulcer, GERD (gastroesophageal reflux disease), Heart valve problem, Hemodialysis patient ([address] History of spinal fracture, blood clots, Hyperlipidemia, Hypertension, MI (myocardial infarction) ([address] (2019), Neuromuscular disorder ([address] Numbness and tingling in left arm, Pneumonia, PONV (postoperative nausea and vomiting), Prolonged emergence from general anesthesia, Sleep apnea, Stroke ([address] TIA (transient ischemic attack), and Unspecified diseases of blood and blood-forming organs. Past surgical history:  has a past surgical history that includes Tonsillectomy; cyst removal (08/14/2013); Colonoscopy; Coronary angioplasty with stent (05/26/15); vascular surgery (aprx 2 years ago); Colonoscopy (2/29/2015); Upper gastrointestinal endoscopy (01/06/2016); Upper gastrointestinal endoscopy (01/29/2017); other surgical history (02/01/2017); Dialysis fistula creation (Left, 10/30/2017); Diagnostic Cardiac Cath Lab Procedure; other surgical history (2018); other surgical history (Bilateral, 06/26/2018); pr lap insertion tunneled intraperitoneal catheter (N/A, 9/21/2018); other surgical history (Right, 09/2018); Dialysis fistula creation (Left, 3/27/2019); other surgical history (05/28/2019); Endoscopy, colon, diagnostic; Catheter Removal (Right, 7/2/2019); Aortic valve replacement (N/A, 10/15/2019); Rectal surgery (N/A, 2/24/2022); and IR TUNNELED CVC PLACE WO SQ PORT/PUMP > 5 YEARS (3/21/2022). Home medications:   Prior to Admission medications    Medication Sig Start Date End Date Taking? Authorizing Provider   fluticasone (FLONASE) 50 MCG/ACT nasal spray SHAKE LIQUID AND USE 2 SPRAYS IN EACH NOSTRIL DAILY 4/17/22   Maritza Vicente MD   oxyCODONE-acetaminophen (PERCOCET) 7.5-325 MG per tablet Take 1 tablet by mouth every 6 hours as needed (pain) for up to 30 days.  4/6/22 5/6/22  Maritza Vicente MD   DULoxetine (CYMBALTA) 60 MG extended release capsule TAKE 1 CAPSULE BY MOUTH EVERY DAY 4/4/22   Maritza Vicente MD   DULoxetine (CYMBALTA) 30 MG extended release capsule TAKE 1 CAPSULE BY MOUTH EVERY DAY 3/29/22   Maritza Vicente MD   tiZANidine (ZANAFLEX) 4 MG tablet TAKE 1 TABLET BY MOUTH THREE TIMES DAILY 3/29/22   Maritza Vicente MD   mineral oil liquid Take 30 mLs by mouth daily as needed for Constipation 3/9/22   Luz Marina Nguyen MD   sennosides-docusate sodium (SENOKOT-S) 8.6-50 MG tablet Take 1 tablet by mouth daily 3/9/22   Luz Marina Nguyen MD   cyclobenzaprine (FLEXERIL) 10 MG tablet TAKE 1 TABLET BY MOUTH EVERY 8 HOURS AS NEEDED 3/7/22   JYA Linares CNP   metoprolol succinate (TOPROL XL) 50 MG extended release tablet Take 1 tablet by mouth in the morning and at bedtime 3/3/22   Jose Graham MD   apixaban (ELIQUIS) 5 MG TABS tablet Take 1 tablet by mouth 2 times daily 3/3/22   Jose Graham MD   pantoprazole (PROTONIX) 40 MG tablet TAKE 1 TABLET BY MOUTH EVERY MORNING BEFORE BREAKFAST 2/22/22   Evonne Johnnie, APRN - CNP   pravastatin (PRAVACHOL) 40 MG tablet Take 1 tablet by mouth daily 2/10/22   Devi Points, JAY Hu CNP   clopidogrel (PLAVIX) 75 MG tablet Take 1 tablet by mouth daily 2/10/22   Devi Points, JAY Hu CNP   QUEtiapine (SEROQUEL) 50 MG tablet TAKE 1 TABLET BY MOUTH IN THE EVENING 1/25/22   Luz Marina Nguyen MD   insulin glargine [facility]) 100 UNIT/ML injection pen Inject 30 Units into the skin nightly 1/17/22   Liv Carranza MD    MG capsule TAKE 1 CAPSULE BY MOUTH TWICE DAILY 11/12/21   JAY Linares CNP   Continuous Blood Gluc Sensor (DEXCOM G6 SENSOR) MISC Every 10 days 10/5/21   Luz Marina Nguyen MD   Continuous Blood Gluc Transmit (DEXCOM G6 TRANSMITTER) MISC 1 each by Does not apply route every 3 months 10/5/21   Luz Marina Nguyen MD   Continuous Blood Gluc  ([address]) [address] 1 each by Does not apply route Daily with lunch 10/5/21   Luz Marina Nguyen MD   B Complex-C-Folic Acid (VIRT-CAPS) 1 MG CAPS TAKE 1 CAPSULE BY MOUTH EVERY DAY 9/20/21   Luz Marina Nguyen MD   Calcium Acetate, Phos Binder, 667 MG CAPS TAKE 1 CAPSULE BY MOUTH THREE TIMES DAILY WITH MEALS 8/12/21   Luz Marina Nguyen MD   LINZESS 145 MCG capsule TAKE 1 CAPSULE BY MOUTH EVERY MORNING BEFORE BREAKFAST 5/25/21   Arley Isabel MD Parish   nitroGLYCERIN (NITROSTAT) 0.4 MG SL tablet DISSOLVE 1 TABLET UNDER THE TONGUE AS NEEDED FOR CHEST PAIN EVERY 5 MINUTES UP TO 3 TIMES. IF NO RELIEF CALL 911. 1/7/21   Mercedez Winston MD   insulin aspart (NOVOLOG FLEXPEN) 100 UNIT/ML injection pen Inject 20 Units into the skin 3 times daily (before meals) 10/12/20   Mercedez Winston MD   vitamin D (ERGOCALCIFEROL) 07495 units CAPS capsule TK 1 C PO WEEKLY 6/2/19   Historical Provider, MD   Tiotropium Bromide-Olodaterol (STIOLTO RESPIMAT) 2.5-2.5 MCG/ACT AERS Inhale 2 puffs into the lungs daily 5/21/19   Trish Haas MD   Polyethylene Glycol [address]  5/2/18   Historical Provider, MD   Blood Glucose Monitoring Suppl ADAM USE AS DIRECTED. 4/25/18   Dedra Garcia MD   Alcohol Swabs PADS USE AS DIRECTED 4/25/18   Dedra Garcia MD   albuterol sulfate  (90 Base) MCG/ACT inhaler Inhale 2 puffs into the lungs every 6 hours as needed for Wheezing 11/8/17   Alexy Rubi MD   ipratropium-albuterol (DUONEB) 0.5-2.5 (3) MG/3ML SOLN nebulizer solution Inhale 3 mLs into the lungs every 6 hours as needed for Shortness of Breath 10/15/17   Sonia Patel MD   calcium carbonate (TUMS) 500 MG chewable tablet Take 1 tablet by mouth 3 times daily as needed for Heartburn. Historical Provider, MD       Social history:  reports that he quit smoking about 1 years ago. His smoking use included cigarettes. He has a 16.50 pack-year smoking history. He has never used smokeless tobacco. He reports previous alcohol use. He reports that he does not use drugs.     Family history:    Family History   Problem Relation Age of Onset    Diabetes Mother     Heart Disease Father     Kidney Disease Sister         stage 4-kidney failure    Cancer Sister     Heart Disease Sister     Obesity Sister     Cancer Sister     Heart Disease Sister     Obesity Sister     Alcohol Abuse Brother          ROS  Review of Systems   Unable to perform ROS: Acuity of condition Exam  ED Triage Vitals   BP Temp Temp src Pulse Resp SpO2 Height Weight   -- -- -- -- -- -- -- --       Physical Exam  Vitals and nursing note reviewed. Constitutional:       General: He is not in acute distress. Appearance: He is well-developed. HENT:      Head: Normocephalic and atraumatic. Nose: Nose normal. No congestion. Eyes:      Extraocular Movements: Extraocular movements intact. Pupils: Pupils are equal, round, and reactive to light. Cardiovascular:      Rate and Rhythm: Regular rhythm. Tachycardia present. Heart sounds: No murmur heard. Pulmonary:      Comments: Tachypneic, moderate respiratory distress, on BiPAP, decreased breath sounds in the bases bilaterally  Abdominal:      General: There is no distension. Palpations: Abdomen is soft. Tenderness: There is no abdominal tenderness. Musculoskeletal:         General: No deformity. Normal range of motion. Cervical back: Normal range of motion and neck supple. Right lower leg: Edema present. Left lower leg: Edema present. Skin:     General: Skin is warm. Findings: No rash. Neurological:      Mental Status: He is alert and oriented to person, place, and time. Motor: No abnormal muscle tone.       Coordination: Coordination normal.   Psychiatric:         Mood and Affect: Mood normal.         Behavior: Behavior normal.           ED Course    ED Medication Orders (From admission, onward)    Start Ordered     Status Ordering Provider    04/19/22 1400 04/18/22 1643  cefepime (MAXIPIME) 1000 mg IVPB minibag  EVERY 24 HOURS        Question:  Antimicrobial Indications  Answer:  Pneumonia (HAP)    Acknowledged RAMA SMITH    04/19/22 0700 04/18/22 1737  pantoprazole (PROTONIX) tablet 40 mg  DAILY BEFORE BREAKFAST         Last MAR action: Not Given - by Patricia Fee on 04/19/22 at [facility] [place]    04/19/22 0045 04/19/22 0038  ipratropium-albuterol (DUONEB) nebulizer solution 3 mL  EVERY 4 HOURS PRN        Question:  Initiate RT Bronchodilator Protocol  Answer:   Yes    Acknowledged AMBAR SMITHSHREYAS    04/18/22 2200 04/18/22 2119  alteplase (CATHFLO) injection 2 mg  ONCE         Last MAR action: Given - by Sage Zaidi on 04/18/22 at [facility]    04/18/22 2200 04/18/22 2119  alteplase (CATHFLO) injection 2 mg  ONCE         Last MAR action: Given - by Sage Zaidi on 04/18/22 at [facility]    04/18/22 2118 04/18/22 2119  heparin (porcine) injection 3,600 Units  PRN         Acknowledged Altaf Mcdaniel    04/18/22 2100 04/18/22 1737  apixaban (ELIQUIS) tablet 5 mg  2 TIMES DAILY         Last MAR action: Not Given - by Omero Mariscal on 04/18/22 at [address]   [address], [facility]    04/18/22 2100 04/18/22 1737  gabapentin (NEURONTIN) capsule 100 mg  3 TIMES DAILY         Last MAR action: Not Given - by Omero Mariscal on 04/18/22 at [address]   [address], [facility]    04/18/22 2100 04/18/22 1737  metoprolol succinate (TOPROL XL) extended release tablet 50 mg  2 times daily         Last MAR action: Not Given - by Omero Mariscal on 04/18/22 at [address]   [address], [facility]    04/18/22 2100 04/18/22 1737  QUEtiapine (SEROQUEL) tablet 50 mg  NIGHTLY         Last MAR action: Not Given - by Omero Mariscal on 04/18/22 at [address]   [address], [facility]    04/18/22 2100 04/18/22 1737  sodium chloride flush 0.9 % injection 5-40 mL  2 times per day         Last MAR action: Given - by Omero Mariscal on 04/18/22 at [address]   [address], [facility]    04/18/22 2100 04/18/22 1737  insulin lispro (HUMALOG) injection vial 0-3 Units  NIGHTLY         Last MAR action: Not Given - by Omero Mariscal on 04/18/22 at [address]   [address], [facility]    04/18/22 2045 04/18/22 2019  metoprolol (LOPRESSOR) injection 5 mg  ONCE         Last MAR action: Given - by Omero Mariscal on 04/18/22 at [address]    04/18/22 1800 04/18/22 1737  clopidogrel (PLAVIX) tablet 75 mg  DAILY         Last MAR action: Not Given - by Rc Mayberry on 04/18/22 at [facility]    04/18/22 1800 04/18/22 1737  docusate sodium (COLACE) capsule 100 mg  DAILY         Last MAR action: Not Given - by Yancy Brooks on 04/18/22 at [facility]    04/18/22 1800 04/18/22 1737  DULoxetine (CYMBALTA) extended release capsule 30 mg  DAILY         Last MAR action: Not Given - by Yancy Brooks on 04/18/22 at [facility]    04/18/22 1800 04/18/22 1737  pravastatin (PRAVACHOL) tablet 40 mg  DAILY         Last MAR action: Not Given - [facility] Yancy Brooks [facility]    04/18/22 1800 04/18/22 1737  furosemide (LASIX) injection 20 mg  ONCE         Last MAR action: Given - by Yancy Brooks on 04/18/22 at [address] [place] [address]    04/18/22 1800 04/18/22 1737  insulin lispro (HUMALOG) injection vial 0-6 Units  3 TIMES DAILY WITH MEALS         Last MAR action: Not Given - by Yancy Brooks on 04/18/22 at [facility]    04/18/22 1737 04/18/22 1737  ondansetron (ZOFRAN-ODT) disintegrating tablet 4 mg  EVERY 8 HOURS PRN        \"Or\" Linked Group Details    Acknowledged RAMA SMITH    04/18/22 1737 04/18/22 1737  ondansetron (ZOFRAN) injection 4 mg  EVERY 6 HOURS PRN        \"Or\" Linked Group Details    Acknowledged RAMA SMITH    04/18/22 1737 04/18/22 1737  acetaminophen (TYLENOL) tablet 650 mg  EVERY 6 HOURS PRN        \"Or\" Linked Group Details    Acknowledged Hope Read    04/18/22 1737 04/18/22 1737  acetaminophen (TYLENOL) suppository 650 mg  EVERY 6 HOURS PRN        \"Or\" Linked Group Details    Acknowledged Hope Read    04/18/22 1737 04/18/22 1737  sodium chloride flush 0.9 % injection 5-40 mL  PRN         Acknowledged RAMA SMITH    04/18/22 1737 04/18/22 1737  0.9 % sodium chloride infusion  PRN         Acknowledged RAMA SMITH    04/18/22 1737 04/18/22 1737  polyethylene glycol (GLYCOLAX) packet 17 g  DAILY PRN         Acknowledged RAMA SMITH    04/18/22 1645 04/18/22 1643  vancomycin (VANCOCIN) intermittent dosing (placeholder)  RX Placeholder        Question:  Antimicrobial Indications  Answer:  Pneumonia (HAP)    Last MAR action: Given - by Jackie  on 04/18/22 at [facility]    04/18/22 1530 04/18/22 1516  vancomycin 1000 mg IVPB in 250 mL D5W addavial  ONCE        Note to Pharmacy: Please dose, plan for HD   Question:  Antimicrobial Indications  Answer:  Pneumonia (HAP)    Last MAR action: Stopped - by Jackie Barbara on 04/18/22 at [facility], [facility]    04/18/22 1515 04/18/22 1516  cefepime (MAXIPIME) 1000 mg IVPB minibag  ONCE        Question:  Antimicrobial Indications  Answer:  Pneumonia (HAP)    Last MAR action: Stopped - by Shaye Drain on 04/18/22 at [facility], [place] [employer] by [place]    04/18/22 1230 04/18/22 1224  albuterol (PROVENTIL) nebulizer solution 7.5 mg  ONCE        Question:  Initiate RT Bronchodilator Protocol  Answer:  Yes    Last MAR action: Given - by Vita Escamilla on 04/18/22 at [address] [employer], [place] [employer] [place] by [address]    04/18/22 1230 04/18/22 1224  furosemide (LASIX) injection 60 mg  ONCE         Last MAR action: Given - by Lorna Hope on 04/18/22 at [address], [facility]          EKG  Shows tachycardia rate 110 normal axis normal intervals no evidence of conduction abnormalities no diagnostic ischemic changes noted       Radiology  XR CHEST PORTABLE    Result Date: 4/18/2022  EXAMINATION: ONE XRAY VIEW OF THE CHEST 4/18/2022 12:25 pm COMPARISON: Chest x-ray dated 03/17/2022. HISTORY: ORDERING SYSTEM PROVIDED HISTORY: sob TECHNOLOGIST PROVIDED HISTORY: Reason for exam:->sob Reason for Exam: sob FINDINGS: LINES/TUBES/OTHER: Left IJ tunneled hemodialysis catheter is noted and in satisfactory position. HEART/MEDIASTINUM: The cardiomediastinal silhouette is stable. PLEURA/LUNGS: There are increased interstitial markings reflecting pulmonary vascular congestion/interstitial edema. There are probable small bilateral pleural effusions. .  There is no appreciable pneumothorax. BONES/SOFT TISSUE: No acute abnormality. Pulmonary vascular congestion/interstitial edema. Probable small bilateral pleural effusions.          Labs  Results for orders placed or performed during the hospital encounter of 04/18/22   COVID-19, Rapid    Specimen: Nasopharyngeal Swab   Result Value Ref Range    SARS-CoV-2, NAAT Not Detected Not Detected   CBC with Auto Differential   Result Value Ref Range    WBC 12.3 (H) 4.0 - 11.0 K/uL    RBC 3.75 (L) 4.20 - 5.90 M/uL    Hemoglobin 11.2 (L) 13.5 - 17.5 g/dL    Hematocrit 34.5 (L) 40.5 - 52.5 %    MCV 92.1 80.0 - 100.0 fL    MCH 29.8 26.0 - 34.0 pg    MCHC 32.4 31.0 - 36.0 g/dL    RDW 16.8 (H) 12.4 - 15.4 %    Platelets 587 533 - 145 K/uL    MPV 8.3 5.0 - 10.5 fL    Neutrophils % 85.4 %    Lymphocytes % 6.3 %    Monocytes % 5.6 %    Eosinophils % 2.0 %    Basophils % 0.7 %    Neutrophils Absolute 10.5 (H) 1.7 - 7.7 K/uL    Lymphocytes Absolute 0.8 (L) 1.0 - 5.1 K/uL    Monocytes Absolute 0.7 0.0 - 1.3 K/uL    Eosinophils Absolute 0.2 0.0 - 0.6 K/uL    Basophils Absolute 0.1 0.0 - 0.2 K/uL   Comprehensive Metabolic Panel w/ Reflex to MG   Result Value Ref Range    Sodium 131 (L) 136 - 145 mmol/L    Potassium reflex Magnesium 4.1 3.5 - 5.1 mmol/L    Chloride 95 (L) 99 - 110 mmol/L    CO2 19 (L) 21 - 32 mmol/L    Anion Gap 17 (H) 3 - 16    Glucose 194 (H) 70 - 99 mg/dL    BUN 85 (HH) 7 - 20 mg/dL    CREATININE 10.9 (HH) 0.9 - 1.3 mg/dL    GFR Non- 5 (A) >60    GFR  6 (A) >60    Calcium 9.2 8.3 - 10.6 mg/dL    Total Protein 7.0 6.4 - 8.2 g/dL    Albumin 3.9 3.4 - 5.0 g/dL    Albumin/Globulin Ratio 1.3 1.1 - 2.2    Total Bilirubin <0.2 0.0 - 1.0 mg/dL    Alkaline Phosphatase 109 40 - 129 U/L    ALT 9 (L) 10 - 40 U/L    AST 12 (L) 15 - 37 U/L   Troponin   Result Value Ref Range    Troponin 0.17 (H) <0.01 ng/mL   Brain Natriuretic Peptide   Result Value Ref Range    Pro-BNP >70,000 (H) 0 - 124 pg/mL   Blood Gas, Venous   Result Value Ref Range    pH, Blade 7.276 (L) 7.350 - 7.450    pCO2, Blade 41.2 40.0 - 50.0 mmHg    pO2, Blade 55.1 (H) 25.0 - 40.0 mmHg    HCO3, Venous 18.8 (L) 23.0 - 29.0 mmol/L    Base Excess, Blade -7.6 (L) -3.0 - 3.0 mmol/L    O2 Sat, Blade 83 Not Established %    Carboxyhemoglobin 1.9 (H) 0.0 - 1.5 %    MetHgb, Blade 0.2 <1.5 %    TC02 (Calc), Blade 20 Not Established mmol/L    O2 Therapy Unknown    Procalcitonin   Result Value Ref Range    Procalcitonin 0.39 (H) 0.00 - 0.15 ng/mL   Lactate, Sepsis   Result Value Ref Range    Lactic Acid, Sepsis 1.3 0.4 - 1.9 mmol/L   Troponin   Result Value Ref Range    Troponin 0.19 (H) <0.01 ng/mL   Blood gas, arterial   Result Value Ref Range    pH, Arterial 7.313 (L) 7.350 - 7.450    pCO2, Arterial 39.6 35.0 - 45.0 mmHg    pO2, Arterial 44.1 (L) 75.0 - 108.0 mmHg    HCO3, Arterial 19.6 (L) 21.0 - 29.0 mmol/L    Base Excess, Arterial -6.1 (L) -3.0 - 3.0 mmol/L    Hemoglobin, Art, Extended 11.0 (L) 13.5 - 17.5 g/dL    O2 Sat, Arterial 75.4 (L) >92 %    Carboxyhgb, Arterial 0.1 0.0 - 1.5 %    Methemoglobin, Arterial 0.5 <1.5 %    TCO2, Arterial 20.8 Not Established mmol/L    O2 Therapy See comment    POCT Glucose   Result Value Ref Range    POC Glucose 194 (H) 70 - 99 mg/dl    Performed on ACCU-CHEK    POCT Glucose   Result Value Ref Range    POC Glucose 121 (H) 70 - 99 mg/dl    Performed on ACCU-CHEK    EKG 12 Lead   Result Value Ref Range    Ventricular Rate 109 BPM    Atrial Rate 109 BPM    P-R Interval 166 ms    QRS Duration 92 ms    Q-T Interval 340 ms    QTc Calculation (Bazett) 457 ms    P Axis 82 degrees    R Axis 26 degrees    T Axis 82 degrees    Diagnosis       Sinus tachycardiaNonspecific ST abnormalityConfirmed by SHITAL HRALEY, Mauri Romero ([id number]) on 4/18/2022 4:07:51 PM         [facility]  Patient seen and evaluated. Relevant records reviewed. 78-year-old male multiple comorbidities arrives in respiratory distress after being found to be in hypoxic respiratory failure.   On arrival here patient is moderate respiratory distress tolerating BiPAP received further breathing treatments and settled tolerating BiPAP nicely. His work-up here is notable for what appears to be multifactorial hypoxic respiratory failure in setting of mild to moderate volume overload due for dialysis today. Given lasix meanwhile until HD. There is a borderline leukocytosis and borderline elevated procalcitonin patient did endorse having a fever a few days ago making pneumonia possibility. I have ordered broad-spectrum antibiotics though at this point not clearly infectious etiology. There is also likely a COPD component as well as he has had improvement after breathing treatments. Labs show elevated troponin in the setting of end-stage renal disease without acute pattern noted on EKG and patient without chest pain at this time making ACS unlikely. Discussed case with nephrology. We will plan to admit for further management. Clinical Impression:  1. Acute respiratory failure, unspecified whether with hypoxia or hypercapnia ([facility]    2. ESRD (end stage renal disease) ([facility]          Disposition:  Admit to PCU in guarded condition. Blood pressure (!) 155/82, pulse 86, temperature 98.2 °F (36.8 °C), temperature source Axillary, resp. rate 11, height 5' 6\" (1.676 m), weight 243 lb 2.7 oz (110.3 kg), SpO2 100 %. Patient was given scripts for the following medications. I counseled patient how to take these medications. Current Discharge Medication List          Disposition referral (if applicable):  No follow-up provider specified. Total critical care time is 45 minutes, which excludes separately billable procedures and updating family. Time spent is specifically for management of the presenting complaint and symptoms initially, direct bedside care, reevaluation, review of records, and consultation.   There was a high probability of clinically significant life-threatening deterioration in the patient's condition, which required my urgent intervention. This chart was generated in part by using Dragon Dictation system and may contain errors related to that system including errors in grammar, punctuation, and spelling, as well as words and phrases that may be inappropriate. If there are any questions or concerns please feel free to contact the dictating provider for clarification.      Jennifer Tovar MD  [address] MD  04/19/22 [id number]

## 2022-04-18 NOTE — TELEPHONE ENCOUNTER
Writer contacted  Dr. Velia Howard, ED provider to inform of 30 day readmission risk. Writer's attempt to contact ED provider was unsuccessful.

## 2022-04-18 NOTE — RT PROTOCOL NOTE

## 2022-04-18 NOTE — RT PROTOCOL NOTE

## 2022-04-18 NOTE — H&P
Hospital Medicine History & Physical      PCP: Milla Giraldo MD    Date of Admission: 4/18/2022    Date of Service: Pt seen/examined on  4/18/22  and Admitted to Inpatient with expected LOS greater than two midnights due to medical therapy. Chief Complaint:   sob      History Of Present Illness:       45 y.o. male who presented to [facility] with  Above c/o . He has a history of ESRD on hemodialysis Monday Wednesday and Friday. He missed last Friday hemodialysis because he was not feeling well. He had nausea and vomiting at that day. He started with shortness of breath for last 2 days and is worsened today. He did not go for dialysis today either. Currently he does not have fever colored sputum nausea vomiting chest pain abdominal pain diarrhea or change in mental status. He does urinate and he says that he takes his medications regularly.     Past Medical History:          Diagnosis Date    Ambulatory dysfunction     walker for long distances, SOB with distance    Aortic stenosis     echo 2017    Arthritis     hands and hips    Asthma     Bilateral hilar adenopathy syndrome 6/3/2013    CAD (coronary artery disease)     Dr. Eric Mckinney [facility]) 04/19/2019    EF= 43%    CH[facility]congestive heart failure) ([facility])     Chronic pain     COPD (chronic obstructive pulmonary disease) ([address]     pulmonology Dr. Myers Wayzata    Depression     Diabetes mellitus ([address]     borderline    Difficult intravenous access     Emphysema of lung ([address]     ESRD (end stage renal disease) on dialysis ([address]     MWF    Fear of needles     Gastric ulcer     GERD (gastroesophageal reflux disease)     Heart valve problem     bicuspic valve    Hemodialysis patient ([address]     History of spinal fracture     work incident    Hx of blood clots     Bilateral lower extremities; stents in place    Hyperlipidemia     Hypertension     MI (myocardial infarction) ([address] 2019    has had 9 MIs. 2019 was the last    Neuromuscular disorder ([address]     due to CVA    Numbness and tingling in left arm     from fistula    Pneumonia     PONV (postoperative nausea and vomiting)     Prolonged emergence from general anesthesia     States requires more medication than most people    Sleep apnea     Uses CPAP    Stroke ([address]     7mm thalamic cva 2017 deficts left side, left side weakness    TIA (transient ischemic attack)     Unspecified diseases of blood and blood-forming organs        Past Surgical History:          Procedure Laterality Date    AORTIC VALVE REPLACEMENT N/A 10/15/2019    TRANSCATHETER AORTIC VALVE REPLACEMENT FEMORAL APPROACH performed by Yadiel Rudolph MD at [address] Right 7/2/2019    PERITONEAL DIALYSIS CATHETER REMOVAL performed by Otoniel Carlos MD at [facility] COLONOSCOPY  2/29/2015    WN    CORONARY ANGIOPLASTY WITH STENT PLACEMENT  05/26/15    CYST REMOVAL  08/14/2013    EXCISION CYSTS, NECK X2 AND ABDOMINAL benign    DIAGNOSTIC CARDIAC CATH LAB PROCEDURE      DIALYSIS FISTULA CREATION Left 10/30/2017    LEFT BRACHIAL CEPHALIC FISTULA    DIALYSIS FISTULA CREATION Left 3/27/2019    LIGATION  AV FISTULA performed by Kathleen Peng MD at [address] [facility], COLON, DIAGNOSTIC      IR TUNNELED [address] 5 YEARS  3/21/2022    IR TUNNELED CATHETER PLACEMENT GREATER THAN 5 YEARS 3/21/2022 [id number]    OTHER SURGICAL HISTORY  02/01/2017    laparoscopic cholecystectomy with intraoperative cholangiogram    OTHER SURGICAL HISTORY  2018    PORT PLACEMENT  - vas cath    OTHER SURGICAL HISTORY Bilateral 06/26/2018    laprascopic peritoneal dialysis catheter placement    OTHER SURGICAL HISTORY Right 09/2018    peritoneal dialysis port placed on right side of abdomen    OTHER SURGICAL HISTORY  05/28/2019    PTA/Stenting R External Iliac artery    [place] LAP INSERTION TUNNELED INTRAPERITONEAL CATHETER N/A 9/21/2018    LAPAROSCOPIC PERITONEAL DIALYSIS CATHETER REPLACEMENT performed by Yadiel Dodson MD at [facility] N/A 2/24/2022    PERINEAL ABCESS DRAINAGE performed by Yadiel Dodson MD at [facility] ENDOSCOPY  01/06/2016    UPPER GASTROINTESTINAL ENDOSCOPY  01/29/2017    possible candida, otherwise normal appearing    VASCULAR SURGERY  aprx 2 years ago    2 stents placed, each side of groin       Medications Prior to Admission:      Prior to Admission medications    Medication Sig Start Date End Date Taking? Authorizing Provider   fluticasone (FLONASE) 50 MCG/ACT nasal spray SHAKE LIQUID AND USE 2 SPRAYS IN EACH NOSTRIL DAILY 4/17/22   Fabiano Ball MD   oxyCODONE-acetaminophen (PERCOCET) 7.5-325 MG per tablet Take 1 tablet by mouth every 6 hours as needed (pain) for up to 30 days.  4/6/22 5/6/22  Fabiano Ball MD   DULoxetine (CYMBALTA) 60 MG extended release capsule TAKE 1 CAPSULE BY MOUTH EVERY DAY 4/4/22   Fabiano Ball MD   DULoxetine (CYMBALTA) 30 MG extended release capsule TAKE 1 CAPSULE BY MOUTH EVERY DAY 3/29/22   Fabiano Ball MD   tiZANidine (ZANAFLEX) 4 MG tablet TAKE 1 TABLET BY MOUTH THREE TIMES DAILY 3/29/22   Fabiano Ball MD   mineral oil liquid Take 30 mLs by mouth daily as needed for Constipation 3/9/22   Fabiano Ball MD   sennosides-docusate sodium (SENOKOT-S) 8.6-50 MG tablet Take 1 tablet by mouth daily 3/9/22   Fabiano Blal MD   cyclobenzaprine (FLEXERIL) 10 MG tablet TAKE 1 TABLET BY MOUTH EVERY 8 HOURS AS NEEDED 3/7/22   Ramo Rain, APRN - CNP   metoprolol succinate (TOPROL XL) 50 MG extended release tablet Take 1 tablet by mouth in the morning and at bedtime 3/3/22   Daniel Ferguson MD   apixaban (ELIQUIS) 5 MG TABS tablet Take 1 tablet by mouth 2 times daily 3/3/22   Daniel Ferguson MD   pantoprazole (PROTONIX) 40 MG tablet TAKE 1 TABLET BY MOUTH EVERY MORNING BEFORE BREAKFAST 2/22/22 Gabriel Briggs, APRN - CNP   pravastatin (PRAVACHOL) 40 MG tablet Take 1 tablet by mouth daily 2/10/22   May Brynn, APRN - CNP   clopidogrel (PLAVIX) 75 MG tablet Take 1 tablet by mouth daily 2/10/22   May Brynn, APRN - CNP   QUEtiapine (SEROQUEL) 50 MG tablet TAKE 1 TABLET BY MOUTH IN THE EVENING 1/25/22   Kathy Schlatter, MD   insulin glargine [facility]) 100 UNIT/ML injection pen Inject 30 Units into the skin nightly 1/17/22   Rubi Santacruz MD   gabapentin (NEURONTIN) 100 MG capsule TAKE 1-2 CAPSULES BY MOUTH THREE TIMES A DAY 11/29/21 2/10/22  Kathy Schlatter, MD    MG capsule TAKE 1 CAPSULE BY MOUTH TWICE DAILY 11/12/21   Tex Guillaume, APRN - CNP   Continuous Blood Gluc Sensor (DEXCOM G6 SENSOR) MISC Every 10 days 10/5/21   Kathy Schlatter, MD   Continuous Blood Gluc Transmit (DEXCOM G6 TRANSMITTER) MISC 1 each by Does not apply route every 3 months 10/5/21   Kathy Schlatter, MD   Continuous Blood Gluc  (DEXCOM G6 ) ADAM 1 each by Does not apply route Daily with lunch 10/5/21   Kathy Schlatter, MD   traZODone (DESYREL) 150 MG tablet TAKE ONE (1) TABLET BY MOUTH NIGHTLY 9/29/21   Kathy Schlatter, MD   B Complex-C-Folic Acid (VIRT-CAPS) 1 MG CAPS TAKE 1 CAPSULE BY MOUTH EVERY DAY 9/20/21   Kathy Schlatter, MD   Calcium Acetate, Phos Binder, 667 MG CAPS TAKE 1 CAPSULE BY MOUTH THREE TIMES DAILY WITH MEALS 8/12/21   Kathy Schlatter, MD   LINZESS 145 MCG capsule TAKE 1 CAPSULE BY MOUTH EVERY MORNING BEFORE BREAKFAST 5/25/21   Kathy Schlatter, MD   nitroGLYCERIN (NITROSTAT) 0.4 MG SL tablet DISSOLVE 1 TABLET UNDER THE TONGUE AS NEEDED FOR CHEST PAIN EVERY 5 MINUTES UP TO 3 TIMES.  IF NO RELIEF CALL 911. 1/7/21   Kathy Schlatter, MD   insulin aspart (NOVOLOG FLEXPEN) 100 UNIT/ML injection pen Inject 20 Units into the skin 3 times daily (before meals) 10/12/20   Kathy Schlatter, MD   vitamin D (ERGOCALCIFEROL) 91776 units CAPS capsule TK 1 C PO WEEKLY 6/2/19   Historical Provider, MD Tiotropium Bromide-Olodaterol (STIOLTO RESPIMAT) 2.5-2.5 MCG/ACT AERS Inhale 2 puffs into the lungs daily 5/21/19   Pablo Hurst MD   Polyethylene Glycol [address]  5/2/18   Historical Provider, MD   Blood Glucose Monitoring Suppl ADAM USE AS DIRECTED. 4/25/18   Jayson Eaton MD   Alcohol Swabs PADS USE AS DIRECTED 4/25/18   Jayson Eaton MD   albuterol sulfate  (90 Base) MCG/ACT inhaler Inhale 2 puffs into the lungs every 6 hours as needed for Wheezing 11/8/17   Dulce Gonzalez MD   ipratropium-albuterol (DUONEB) 0.5-2.5 (3) MG/3ML SOLN nebulizer solution Inhale 3 mLs into the lungs every 6 hours as needed for Shortness of Breath 10/15/17   Cici Ferraro MD   calcium carbonate (TUMS) 500 MG chewable tablet Take 1 tablet by mouth 3 times daily as needed for Heartburn. Historical Provider, MD       Allergies:  Morphine    Social History:      The patient currently lives at home walks with a walker    TOBACCO:   reports that he quit smoking about 1 years ago. His smoking use included cigarettes. He has a 16.50 pack-year smoking history. He has never used smokeless tobacco.  ETOH:   reports previous alcohol use. E-Cigarettes/Vaping Use     Questions Responses    E-Cigarette/Vaping Use Never User    Start Date     Passive Exposure     Quit Date     Counseling Given     Comments             Family History:      Reviewed in detail and negative for DM, CAD, Cancer, CVA. Positive as follows:        Problem Relation Age of Onset    Diabetes Mother     Heart Disease Father     Kidney Disease Sister         stage 4-kidney failure    Cancer Sister     Heart Disease Sister     Obesity Sister     Cancer Sister     Heart Disease Sister     Obesity Sister     Alcohol Abuse Brother        REVIEW OF SYSTEMS COMPLETED:   Pertinent positives as noted in the HPI. All other systems reviewed and negative.     PHYSICAL EXAM PERFORMED:    BP (!) 168/76   Pulse 98   Temp 97.9 °F (36.6 °C) (Axillary) Resp 16   Ht 5' 6\" (1.676 m)   Wt 250 lb (113.4 kg)   SpO2 100%   BMI 40.35 kg/m²     General appearance:  No apparent distress, appears stated age and cooperative. Obese, on BiPAP  HEENT:  Normal cephalic, atraumatic without obvious deformity. Extra ocular muscles intact. Conjunctivae/corneas clear. Neck: Supple, with full range of motion. No jugular venous distention. Trachea midline. Respiratory: On BiPAPbilaterally with Rales/occasional wheezes/Rhonchi. Cardiovascular:  Regular rate and rhythm with normal S1/S2 with murmurs,no rubs or gallops. Abdomen: Soft, non-tender, non-distended with normal bowel sounds. Obese  Musculoskeletal:  No clubbing, cyanosis or edema bilaterally. Full range of motion without deformity. Skin: Skin color, texture, turgor normal.  Since small skin lesions right fourth toe no evidence of infection. Neurologic:  Neurovascularly intact , no obvious motor weakness   psychiatric:  Alert and oriented, thought content appropriate, normal insight  Capillary Refill: Brisk,3 seconds, normal  Peripheral Pulses: +2 palpable, equal bilaterally       Labs:     Recent Labs     04/18/22  1252   WBC 12.3*   HGB 11.2*   HCT 34.5*        Recent Labs     04/18/22  1252   *   K 4.1   CL 95*   CO2 19*   BUN 85*   CREATININE 10.9*   CALCIUM 9.2     Recent Labs     04/18/22  1252   AST 12*   ALT 9*   BILITOT <0.2   ALKPHOS 109     No results for input(s): INR in the last 72 hours.   Recent Labs     04/18/22  1252   TROPONINI 0.17*       Urinalysis:      Lab Results   Component Value Date    NITRU Negative 03/18/2022    WBCUA  03/18/2022    BACTERIA 1+ 03/18/2022    RBCUA 3-4 03/18/2022    BLOODU TRACE-INTACT 03/18/2022    SPECGRAV 1.020 03/18/2022    GLUCOSEU 100 03/18/2022       Radiology:     CXR: I have reviewed the CXR with the following interpretation:   EKG:  I have reviewed the EKG with the following interpretation: Sinus tachycardia 109/min    XR CHEST PORTABLE Final Result   Pulmonary vascular congestion/interstitial edema. Probable small bilateral   pleural effusions. ASSESSMENT:    Active Hospital Problems    Diagnosis Date Noted    Respiratory failure [facility]) [J96.90] 04/18/2022         PLAN:    Acute hypoxic respiratory failure-secondary to fluid overload following missing two  hemodialysis--admit, telemetry, troponin, Lasix IV, nephrology was consulted from emergency room, emergency hemodialysis,  Currently on BiPAP, may wean after dialysis    Acute on chronic systolic CHF-ejection fraction 45% in January 2022-possibly secondary to missing hemodialysis-hemodialysis IV Lasix, intake and output, serial troponin, consider cardiology if necessary    Troponin was elevated-secondary to ESRD/demand supply mismatch--trend and cardiology if necessary    Metabolic acidosis-secondary to renal failure, monitor with dialysis, repeat ABG    Aortic stenosis, AVR-on Eliquis    CAD stent-given shortness of breath rule out ACS management as above telemetry troponin continue with Plavix, beta-blocker and statin    Diabetes-hold diabetic medications because of n.p.o. status and monitor blood sugar with low correction scale insulin, hemoglobin A1c    COPD-continue home inhalers    Shortness of breath, mild leukocytosis and elevated procalcitonin-antibiotics for possible pneumonia-Vanco was given in the emergency room, pharmacy to dose, cefepime,    Small skin breakdown right fourth toe-no evidence of infection, skin care        DVT Prophylaxis: Eliquis  Diet: N.p.o.  Code Status: Full code    PT/OT Eval Status: May need    Dispo -admitted to Creston Boas, MD    Thank you Fabiano Ball MD for the opportunity to be involved in this patient's care. If you have any questions or concerns please feel free to contact me at [phone].

## 2022-04-19 PROBLEM — I42.9 CARDIOMYOPATHY (HCC): Status: ACTIVE | Noted: 2022-04-19

## 2022-04-19 PROBLEM — Z87.891 FORMER SMOKER: Status: ACTIVE | Noted: 2022-04-19

## 2022-04-19 LAB
ANION GAP SERPL CALCULATED.3IONS-SCNC: 18 MMOL/L (ref 3–16)
BUN BLDV-MCNC: 47 MG/DL (ref 7–20)
CALCIUM SERPL-MCNC: 9 MG/DL (ref 8.3–10.6)
CHLORIDE BLD-SCNC: 94 MMOL/L (ref 99–110)
CO2: 20 MMOL/L (ref 21–32)
CREAT SERPL-MCNC: 7.7 MG/DL (ref 0.9–1.3)
ESTIMATED AVERAGE GLUCOSE: 142.7 MG/DL
GFR AFRICAN AMERICAN: 9
GFR NON-AFRICAN AMERICAN: 7
GLUCOSE BLD-MCNC: 121 MG/DL (ref 70–99)
GLUCOSE BLD-MCNC: 141 MG/DL (ref 70–99)
GLUCOSE BLD-MCNC: 213 MG/DL (ref 70–99)
GLUCOSE BLD-MCNC: 280 MG/DL (ref 70–99)
HBA1C MFR BLD: 6.6 %
PERFORMED ON: ABNORMAL
POTASSIUM REFLEX MAGNESIUM: 4.2 MMOL/L (ref 3.5–5.1)
PROCALCITONIN: 1.47 NG/ML (ref 0–0.15)
SODIUM BLD-SCNC: 132 MMOL/L (ref 136–145)
VANCOMYCIN RANDOM: 7.9 UG/ML

## 2022-04-19 PROCEDURE — 2580000003 HC RX 258: Performed by: INTERNAL MEDICINE

## 2022-04-19 PROCEDURE — 99223 1ST HOSP IP/OBS HIGH 75: CPT | Performed by: INTERNAL MEDICINE

## 2022-04-19 PROCEDURE — 90935 HEMODIALYSIS ONE EVALUATION: CPT

## 2022-04-19 PROCEDURE — 6370000000 HC RX 637 (ALT 250 FOR IP): Performed by: HOSPITALIST

## 2022-04-19 PROCEDURE — 2060000000 HC ICU INTERMEDIATE R&B

## 2022-04-19 PROCEDURE — 80048 BASIC METABOLIC PNL TOTAL CA: CPT

## 2022-04-19 PROCEDURE — 94660 CPAP INITIATION&MGMT: CPT

## 2022-04-19 PROCEDURE — 6360000002 HC RX W HCPCS: Performed by: INTERNAL MEDICINE

## 2022-04-19 PROCEDURE — 6370000000 HC RX 637 (ALT 250 FOR IP): Performed by: INTERNAL MEDICINE

## 2022-04-19 PROCEDURE — 80202 ASSAY OF VANCOMYCIN: CPT

## 2022-04-19 PROCEDURE — 2700000000 HC OXYGEN THERAPY PER DAY

## 2022-04-19 PROCEDURE — 5A1D70Z PERFORMANCE OF URINARY FILTRATION, INTERMITTENT, LESS THAN 6 HOURS PER DAY: ICD-10-PCS | Performed by: INTERNAL MEDICINE

## 2022-04-19 PROCEDURE — 36415 COLL VENOUS BLD VENIPUNCTURE: CPT

## 2022-04-19 PROCEDURE — 84145 PROCALCITONIN (PCT): CPT

## 2022-04-19 PROCEDURE — 94761 N-INVAS EAR/PLS OXIMETRY MLT: CPT

## 2022-04-19 RX ORDER — OXYCODONE AND ACETAMINOPHEN 7.5; 325 MG/1; MG/1
1 TABLET ORAL EVERY 6 HOURS PRN
Status: DISCONTINUED | OUTPATIENT
Start: 2022-04-19 | End: 2022-04-27 | Stop reason: HOSPADM

## 2022-04-19 RX ORDER — DEXTROSE MONOHYDRATE 50 MG/ML
100 INJECTION, SOLUTION INTRAVENOUS PRN
Status: DISCONTINUED | OUTPATIENT
Start: 2022-04-19 | End: 2022-04-27 | Stop reason: HOSPADM

## 2022-04-19 RX ORDER — NICOTINE POLACRILEX 4 MG
15 LOZENGE BUCCAL PRN
Status: DISCONTINUED | OUTPATIENT
Start: 2022-04-19 | End: 2022-04-19 | Stop reason: RX

## 2022-04-19 RX ORDER — IPRATROPIUM BROMIDE AND ALBUTEROL SULFATE 2.5; .5 MG/3ML; MG/3ML
1 SOLUTION RESPIRATORY (INHALATION) EVERY 4 HOURS PRN
Status: DISCONTINUED | OUTPATIENT
Start: 2022-04-19 | End: 2022-04-27 | Stop reason: HOSPADM

## 2022-04-19 RX ORDER — DEXTROSE MONOHYDRATE 25 G/50ML
12.5 INJECTION, SOLUTION INTRAVENOUS PRN
Status: DISCONTINUED | OUTPATIENT
Start: 2022-04-19 | End: 2022-04-19 | Stop reason: SDUPTHER

## 2022-04-19 RX ADMIN — APIXABAN 5 MG: 5 TABLET, FILM COATED ORAL at 14:52

## 2022-04-19 RX ADMIN — VANCOMYCIN HYDROCHLORIDE 500 MG: 500 INJECTION, POWDER, LYOPHILIZED, FOR SOLUTION INTRAVENOUS at 22:17

## 2022-04-19 RX ADMIN — GABAPENTIN 100 MG: 100 CAPSULE ORAL at 14:51

## 2022-04-19 RX ADMIN — SODIUM CHLORIDE, PRESERVATIVE FREE 10 ML: 5 INJECTION INTRAVENOUS at 08:49

## 2022-04-19 RX ADMIN — QUETIAPINE FUMARATE 50 MG: 25 TABLET ORAL at 22:17

## 2022-04-19 RX ADMIN — METOPROLOL SUCCINATE 50 MG: 50 TABLET, EXTENDED RELEASE ORAL at 22:17

## 2022-04-19 RX ADMIN — GABAPENTIN 100 MG: 100 CAPSULE ORAL at 22:17

## 2022-04-19 RX ADMIN — DULOXETINE HYDROCHLORIDE 30 MG: 30 CAPSULE, DELAYED RELEASE ORAL at 14:51

## 2022-04-19 RX ADMIN — INSULIN LISPRO 3 UNITS: 100 INJECTION, SOLUTION INTRAVENOUS; SUBCUTANEOUS at 17:51

## 2022-04-19 RX ADMIN — METOPROLOL SUCCINATE 50 MG: 50 TABLET, EXTENDED RELEASE ORAL at 14:55

## 2022-04-19 RX ADMIN — APIXABAN 5 MG: 5 TABLET, FILM COATED ORAL at 22:25

## 2022-04-19 RX ADMIN — OXYCODONE AND ACETAMINOPHEN 1 TABLET: 7.5; 325 TABLET ORAL at 16:37

## 2022-04-19 RX ADMIN — CLOPIDOGREL BISULFATE 75 MG: 75 TABLET, FILM COATED ORAL at 14:51

## 2022-04-19 ASSESSMENT — PAIN SCALES - GENERAL
PAINLEVEL_OUTOF10: 0
PAINLEVEL_OUTOF10: 4
PAINLEVEL_OUTOF10: 0
PAINLEVEL_OUTOF10: 10
PAINLEVEL_OUTOF10: 0

## 2022-04-19 ASSESSMENT — PAIN DESCRIPTION - PAIN TYPE: TYPE: CHRONIC PAIN

## 2022-04-19 ASSESSMENT — PAIN DESCRIPTION - ORIENTATION: ORIENTATION: LOWER

## 2022-04-19 ASSESSMENT — PAIN DESCRIPTION - LOCATION: LOCATION: BACK

## 2022-04-19 NOTE — PROGRESS NOTES
04/19/22 1000   NIV Type   NIV Started/Stopped On   Equipment Type v60   Mode Bilevel   Mask Type Full face mask   Mask Size Large   Settings/Measurements   IPAP 18 cmH20   CPAP/EPAP 6 cmH2O   Resp 30   FiO2  35 %   Vt Exhaled 1200 mL   Mask Leak (lpm) 20 lpm   Comfort Level Good   Using Accessory Muscles No   SpO2 99   Alarm Settings   Alarms On Y

## 2022-04-19 NOTE — FLOWSHEET NOTE
Treatment time: 2 hours  Net UF: 2800 ml     Pre weight: 113.4 kg   Post weight:110.3 kg  EDW: 116.4 kg     Access used: LCW CVC  Access function: Poor with -250 ml/min     Medications or blood products given: Cathflo     Regular outpatient schedule: [address]     Summary of response to treatment: Tolerated tx fair. Was extremely restless on bipap throughout entire tx. CVC function is poor and cathflo was dwelled at end of HD.      Copy of dialysis treatment record placed in chart, to be scanned into EMR. 2       04/18/22 2040 04/18/22 2320   Treatment   Time On  --  2046   Time Off  --  2314   Vital Signs   BP (!) 168/98 (!) 177/116   Temp 98 °F (36.7 °C) 97.5 °F (36.4 °C)   Pulse 88 102   Resp 24 22   Dialysis Bath   K+ (Potassium) 3  --    Ca+ (Calcium) 2.5  --    Na+ (Sodium) 138  --    HCO3 (Bicarb) 35  --

## 2022-04-19 NOTE — PROGRESS NOTES
04/19/22 0037   RT Protocol   History Pulmonary Disease 2   Respiratory pattern 0   Breath sounds 0   Cough 0   Indications for Bronchodilator Therapy On home bronchodilators   Bronchodilator Assessment Score 2

## 2022-04-19 NOTE — PROGRESS NOTES
Hospitalist Progress Note    Patient:  Varinder Abad  Unit/Bed:0433/0433-01   YOB: 1970       MRN: [id number] Acct: [de-identified]  PCP: Rafy Dover MD    Date of Admission: 4/18/2022  --------------------------    Chief Complaint:     Shortness of breath    Hospital Course:     Varinder Abad is a 45 y.o. male hospitalized on 4/18/2022   with history of end-stage renal failure CHF, A. fib, hospitalized for recurrent shortness of breath and acute hypoxic respiratory failure secondary to nonadherence to HD. Karli Hart Assessment/plan:        Acute hypoxic respiratory failure secondary to volume overload/acute CHF from end-stage renal failure  -Appreciate nephrology input,  -Patient currently undergoing HD  -Continue respiratory support, wean off oxygen as tolerated  -Patient adherence to hemodialysis treatment outpatient is of paramount importance to avoid recurrent hospitalization. Acute on chronic CHF, undetermined type, EF of 45% secondary to nonadherence to hemodialysis  -Continue ultrafiltration with HD, diuresis as the patient still making urine    Nonspecific troponin elevation in the setting of end-stage renal failure    Elevated procalcitonin, no clinical evidence to support pneumonia  Antibiotic discontinued    COPD without exacerbation  -Respiratory care protocol, oxygen supplementation as needed    Aortic stenosis/bicuspid aortic valve status post TAVR    Coronary artery disease  Continue selected home medication occluding Plavix, beta-blocker and statin    Diabetes mellitus type 2 with multiple complication  Monitor blood sugar, insulin as required      Right fourth toe ulcer, continue wound care    End-stage renal failure/   Nephrology following, continue hemodialysis  -Mild hyponatremia noted, management per nephrology      Mood disorder, continue Cymbalta, Seroquel    Dyslipidemia, CAD status post prior stents  Continue statin    afib Anticoagulation with Eliquis.     Code Status: Full Code         DVT prophylaxis: Eliquis     Disposition: Expecting discharge in 2 to 3 days pending improvement of respiratory status. I discussed my thought processes at length with patient/family and patient understood. Question and concerns  Addressed      Discussed with RN      ----------------      Subjective:     Patient seen and examined  Overnight events noted  RN and ancillary staff note reviewed    HD progressing  Shortness of breath waxing and waning, currently wearing BiPAP. No chest pain    There have been some probable medication, dietary and lifestyle compliance issues here. I have discussed with him the great importance of following the treatment plan exactly as directed in order to achieve a good medical outcome. Diet: ADULT DIET; Regular; Low Potassium (Less than 3000 mg/day)    OBJECTIVE     Exam:  BP (!) 157/83   Pulse 114   Temp 97.3 °F (36.3 °C) (Oral)   Resp 24   Ht 5' 6\" (1.676 m)   Wt 238 lb 1.6 oz (108 kg)   SpO2 100%   BMI 38.43 kg/m²            Gen: Not in distress. Alert. Chronically ill  Head: Normocephalic. Atraumatic. Eyes: Conjunctivae/corneas clear. ENT: Limited by BiPAP neck: No JVD. No obvious thyromegaly. CVS: Nml S1S2,  murmur     Pulmomary: Diminished air entry. Gastrointestinal: Soft, non tender, non distend, . Musculoskeletal: Bilateral edema  Neuro: No focal deficit. Moves extremity spontaneously. Psychiatry: Appropriate affect. Not agitated.         Medications:  Reviewed    Infusion Medications    sodium chloride       Scheduled Medications    apixaban  5 mg Oral BID    clopidogrel  75 mg Oral Daily    docusate sodium  100 mg Oral Daily    DULoxetine  30 mg Oral Daily    gabapentin  100 mg Oral TID    metoprolol succinate  50 mg Oral BID    pantoprazole  40 mg Oral QAM AC    pravastatin  40 mg Oral Daily    QUEtiapine  50 mg Oral Nightly    sodium chloride flush  5-40 mL IntraVENous 2 times per day    insulin lispro  0-6 Units SubCUTAneous TID WC    insulin lispro  0-3 Units SubCUTAneous Nightly    vancomycin (VANCOCIN) intermittent dosing (placeholder)   Other RX Placeholder    cefepime  1,000 mg IntraVENous Q24H     PRN Meds: ipratropium-albuterol, sodium chloride flush, sodium chloride, ondansetron **OR** ondansetron, polyethylene glycol, acetaminophen **OR** acetaminophen      Intake/Output Summary (Last 24 hours) at 4/19/2022 1405  Last data filed at 4/19/2022 1233  Gross per 24 hour   Intake 1600 ml   Output 7450 ml   Net -5850 ml             Labs:   Recent Labs     04/18/22  1252   WBC 12.3*   HGB 11.2*   HCT 34.5*        Recent Labs     04/18/22  1252   *   K 4.1   CL 95*   CO2 19*   BUN 85*   CREATININE 10.9*   CALCIUM 9.2     Recent Labs     04/18/22  1252   AST 12*   ALT 9*   BILITOT <0.2   ALKPHOS 109     No results for input(s): INR in the last 72 hours. Recent Labs     04/18/22  1252 04/18/22 2033   TROPONINI 0.17* 0.19*       Urinalysis:      Lab Results   Component Value Date    NITRU Negative 03/18/2022    WBCUA  03/18/2022    BACTERIA 1+ 03/18/2022    RBCUA 3-4 03/18/2022    BLOODU TRACE-INTACT 03/18/2022    SPECGRAV 1.020 03/18/2022    GLUCOSEU 100 03/18/2022       Radiology:  XR CHEST PORTABLE   Final Result   Pulmonary vascular congestion/interstitial edema. Probable small bilateral   pleural effusions.                      Electronically signed by Kylah Morocho MD on 4/19/2022 at 2:05 PM

## 2022-04-19 NOTE — CONSULTS
INPATIENT PULMONARY CRITICAL CARE CONSULT NOTE      Chief Complaint/Referring Provider:  Patient is being seen at the request of Dr. Darshan Villarreal  for a consultation for respiratory failure-BIPAP     Presenting HPI: Patient came to the hospital with shortness of breath    As per admitting provider-52 y.o. male who presented to [facility] with  Above c/o . He has a history of ESRD on hemodialysis Monday Wednesday and Friday. He missed last Friday hemodialysis because he was not feeling well. He had nausea and vomiting at that day. He started with shortness of breath for last 2 days and is worsened today. He did not go for dialysis today either. Currently he does not have fever colored sputum nausea vomiting chest pain abdominal pain diarrhea or change in mental status.   He does urinate and he says that he takes his medications regularly  Patient seen this morning was getting hemodialysis, patient was on BiPAP at that time and patient's IPAP was 18 and EPAP was 6 with backup rate of 8 and patient was generating a regular volume of 895 mL, patient was in increased shortness of breath and respiratory distress and had increased work of breathing when seen, patient was not have any chest pain, patient could not elucidate any specific symptoms at that time because of profound tachypnea and shortness of breath, patient was afebrile and patient's blood pressure was slightly on the higher side, patient's blood sugars were fluctuating and not optimally controlled, patient was on 35% oxygen on the BiPAP when seen, as stated above no other pertinent review of system would be obtained because of patient's clinical status which was somewhat precarious at the time of evaluation       Patient Active Problem List    Diagnosis Date Noted    Hypotension due to drugs 11/25/2017    Acute on chronic combined systolic and diastolic heart failure ([facility]     Ischemic cardiomyopathy     Dyslipidemia     Type 2 diabetes, uncontrolled, with neuropathy ([address] 03/04/2014    Bicuspid aortic valve 06/03/2013    CHF (congestive heart failure) ([address] 05/14/2013    Coronary artery disease involving native coronary artery of native heart without angina pectoris 05/14/2013    PVD (peripheral vascular disease) ([address] 05/14/2013    Former smoker 04/19/2022    Cardiomyopathy ([address] 04/19/2022    S/P TAVR (transcatheter aortic valve replacement) 10/19/2019    HTN (hypertension), benign 11/14/2013    Asthma-COPD overlap syndrome ([address] 05/14/2013    Respiratory failure ([address] 04/18/2022    Pulmonary edema with diastolic CHF, NYHA class 3 ([address] 03/17/2022    Liberty-rectal abscess     Somnolence     Atrial flutter (HCC)     SIRS (systemic inflammatory response syndrome) ([address] 02/21/2022    NSTEMI (non-ST elevated myocardial infarction) ([address] 01/12/2022    Acute respiratory failure with hypercapnia ([address] 11/30/2021    Acute pulmonary edema ([address] 11/30/2021    Obesity, Class II, BMI 35-39.9 11/30/2021    Grade II diastolic dysfunction 70/32/0797    Shock circulatory ([address] 11/30/2021    Smoker 11/30/2021    Normocytic normochromic anemia 11/30/2021    Respiratory failure with hypoxia ([address] 11/29/2021    Speech problem     Urinary tract infection with hematuria     Acute CVA (cerebrovascular accident) ([address] 09/07/2021    Acute hypoxemic respiratory failure ([address] 08/12/2021    Type 2 diabetes mellitus with hyperglycemia ([address] 06/27/2021    Acute encephalopathy 06/27/2021    Cellulitis and abscess of hand 04/24/2021    Iliac artery occlusion, right (HCC)     Cellulitis of right foot 01/29/2021    Peripheral vascular occlusive disease ([address] 01/02/2021    Ataxia     Weakness of both lower extremities 12/30/2020    Sinus bradycardia 12/30/2020    Sinus pause     GBS (Guillain-[place] syndrome) (HCC)     Bilateral leg weakness 12/04/2020    Bradycardia 10/19/2019    Syncope and collapse 10/19/2019    PAF (paroxysmal atrial fibrillation) ([address]     Hypercholesteremia 07/30/2019    Chronic bronchitis ([address] 05/21/2019    Nasal congestion 05/21/2019    Chronic, continuous use of opioids 04/15/2019    Steal syndrome of dialysis vascular access (HCC)     SOB (shortness of breath) on exertion 12/24/2018    Anemia of chronic disease 11/12/2018    Pulmonary edema 11/09/2018    Fluid overload 11/09/2018    Renovascular hypertension     Mixed hyperlipidemia     Cigarette nicotine dependence in remission     Neuromuscular disorder (HCC)     Acute diastolic CHF (congestive heart failure) ([address] 03/18/2018    Hemodialysis-associated hypotension 11/22/2017    ESRD on dialysis ([address] 11/22/2017    Mucus plugging of bronchi     Nonrheumatic aortic valve stenosis     Pleural effusion     Chronic anemia     History of CVA (cerebrovascular accident)     Type 2 diabetes mellitus without complication, without long-term current use of insulin ([address]     ZAINAB (obstructive sleep apnea)     Tobacco abuse 05/21/2017    CVA (cerebral vascular accident) ([address] 05/21/2017    Angina, class IV ([facility])     Dyspnea     Gastroparesis due to DM ([address]     Biliary colic 67/29/9109    Symptomatic cholelithiasis 01/04/2017    Degeneration of lumbar or lumbosacral intervertebral disc 03/18/2016    Lumbar radiculopathy 03/18/2016    Lumbosacral spondylosis without myelopathy 03/18/2016    ZAINAB on CPAP 02/11/2016    Obesity (BMI 30-39. 9)     Pneumonia of right upper lobe due to infectious organism 03/28/2015    Depression with anxiety 06/05/2014    Passive smoke exposure 05/30/2014    Diabetic neuropathy ([address] 11/14/2013    Claudication in peripheral vascular disease ([address] 06/26/2013    Bilateral hilar adenopathy syndrome 06/03/2013    Sepsis without acute organ dysfunction ([address] 12/25/2012       Past Medical History:   Diagnosis Date    Ambulatory dysfunction     walker for long distances, SOB with distance    Aortic stenosis     echo 2017  Arthritis     hands and hips    Asthma     Bilateral hilar adenopathy syndrome 6/3/2013    CAD (coronary artery disease)     Dr. Waldo Veloz [facility]) 04/19/2019    EF= 43%    CHF (congestive heart failure) (HCC)     Chronic pain     COPD (chronic obstructive pulmonary disease) ([address]     pulmonology Dr. Christian Givens    Depression     Diabetes mellitus ([address]     borderline    Difficult intravenous access     Emphysema of lung ([address]     ESRD (end stage renal disease) on dialysis ([address]     MWF    Fear of needles     Gastric ulcer     GERD (gastroesophageal reflux disease)     Heart valve problem     bicuspic valve    Hemodialysis patient ([address]     History of spinal fracture     work incident    Hx of blood clots     Bilateral lower extremities; stents in place    Hyperlipidemia     Hypertension     MI (myocardial infarction) ([address] 2019    has had 9 MIs. 2019 was the last    Neuromuscular disorder ([address]     due to CVA    Numbness and tingling in left arm     from fistula    Pneumonia     PONV (postoperative nausea and vomiting)     Prolonged emergence from general anesthesia     States requires more medication than most people    Sleep apnea     Uses CPAP    Stroke ([address]     7mm thalamic cva 2017 deficts left side, left side weakness    TIA (transient ischemic attack)     Unspecified diseases of blood and blood-forming organs         Past Surgical History:   Procedure Laterality Date    AORTIC VALVE REPLACEMENT N/A 10/15/2019    TRANSCATHETER AORTIC VALVE REPLACEMENT FEMORAL APPROACH performed by Guerrero Carter MD at [address] [place] [address] Ave Right 7/2/2019    PERITONEAL DIALYSIS CATHETER REMOVAL performed by Lesli Thorpe MD at [facility] COLONOSCOPY  2/29/2015    WN    CORONARY ANGIOPLASTY WITH STENT PLACEMENT  05/26/15    CYST REMOVAL  08/14/2013    EXCISION CYSTS, NECK X2 AND ABDOMINAL benign    DIAGNOSTIC CARDIAC CATH LAB PROCEDURE      DIALYSIS FISTULA CREATION Left 10/30/2017    LEFT BRACHIAL CEPHALIC FISTULA    DIALYSIS FISTULA CREATION Left 3/27/2019    LIGATION  AV FISTULA performed by Ciera Hermosillo MD at [address] [facility], COLON, DIAGNOSTIC      IR TUNNELED [address] 5 YEARS  3/21/2022    IR TUNNELED CATHETER PLACEMENT GREATER THAN 5 YEARS 3/21/2022 [id number] SPECIAL PROCEDURES    OTHER SURGICAL HISTORY  2017    laparoscopic cholecystectomy with intraoperative cholangiogram    OTHER SURGICAL HISTORY  2018    PORT PLACEMENT  - vas cath    OTHER SURGICAL HISTORY Bilateral 2018    laprascopic peritoneal dialysis catheter placement    OTHER SURGICAL HISTORY Right 2018    peritoneal dialysis port placed on right side of abdomen    OTHER SURGICAL HISTORY  2019    PTA/Stenting R External Iliac artery    DC LAP INSERTION TUNNELED INTRAPERITONEAL CATHETER N/A 2018    LAPAROSCOPIC PERITONEAL DIALYSIS CATHETER REPLACEMENT performed by Tammie Werner MD at [facility] N/A 2022    PERINEAL ABCESS DRAINAGE performed by Tammie Werner MD at [facility] ENDOSCOPY  2016    UPPER GASTROINTESTINAL ENDOSCOPY  2017    possible candida, otherwise normal appearing    VASCULAR SURGERY  aprx 2 years ago    2 stents placed, each side of groin        Family History   Problem Relation Age of Onset    Diabetes Mother     Heart Disease Father     Kidney Disease Sister         stage 4-kidney failure    Cancer Sister     Heart Disease Sister     Obesity Sister     Cancer Sister     Heart Disease Sister     Obesity Sister     Alcohol Abuse Brother         Social History     Tobacco Use    Smoking status: Former Smoker     Packs/day: 0.50     Years: 33.00     Pack years: 16.50     Types: Cigarettes     Quit date: 2020     Years since quittin.9    Smokeless tobacco: Never Used   Velazquez Tobacco comment: States quit December 2021   Substance Use Topics    Alcohol use: Not Currently     Alcohol/week: 0.0 standard drinks     Comment: occ        Allergies   Allergen Reactions    Morphine Nausea And Vomiting               Physical Exam:  Blood pressure (!) 105/59, pulse 88, temperature 98.1 °F (36.7 °C), temperature source Oral, resp. rate 20, height 5' 6\" (1.676 m), weight 238 lb 1.6 oz (108 kg), SpO2 98 %.'     Constitutional: In distinct shortness of breath with increased work of breathing on BiPAP when seen  HENT: BiPAP mask intact no thyromegaly. Eyes:  Conjunctivae are normal. Pupils equal, round, and reactive to light. No scleral icterus. Neck: . No tracheal deviation present. No obvious thyroid mass. Short large neck  Cardiovascular: Normal rate, regular rhythm, normal heart sounds. No right ventricular heave. Minimal lower extremity edema. Pulmonary/Chest: No wheezes. Bilateral rales. Chest wall is not dull to percussion. Some accessory muscle usage or stridor. Decreased breath sound intensity  Abdominal: Soft. Bowel sounds present. No distension or hernia. No tenderness. Obese  Musculoskeletal: No cyanosis. No clubbing. No obvious joint deformity. Lymphadenopathy: No cervical or supraclavicular adenopathy. Skin: Skin is warm and dry. No rash or nodules on the exposed extremities. Psychiatric: Anxious secondary to increased work of breathing and shortness of breath  Neurologic: Anxious, no focal motor deficits        Results:  CBC:   Recent Labs     04/18/22  1252   WBC 12.3*   HGB 11.2*   HCT 34.5*   MCV 92.1        BMP:   Recent Labs     04/18/22  1252   *   K 4.1   CL 95*   CO2 19*   BUN 85*   CREATININE 10.9*     LIVER PROFILE:   Recent Labs     04/18/22  1252   AST 12*   ALT 9*   BILITOT <0.2   ALKPHOS 109       Imaging:  I have reviewed radiology images personally. XR CHEST PORTABLE   Final Result   Pulmonary vascular congestion/interstitial edema. Probable small bilateral   pleural effusions. XR CHEST PORTABLE    Result Date: 4/18/2022  EXAMINATION: ONE XRAY VIEW OF THE CHEST 4/18/2022 12:25 pm COMPARISON: Chest x-ray dated 03/17/2022. HISTORY: ORDERING SYSTEM PROVIDED HISTORY: sob TECHNOLOGIST PROVIDED HISTORY: Reason for exam:->sob Reason for Exam: sob FINDINGS: LINES/TUBES/OTHER: Left IJ tunneled hemodialysis catheter is noted and in satisfactory position. HEART/MEDIASTINUM: The cardiomediastinal silhouette is stable. PLEURA/LUNGS: There are increased interstitial markings reflecting pulmonary vascular congestion/interstitial edema. There are probable small bilateral pleural effusions. .  There is no appreciable pneumothorax. BONES/SOFT TISSUE: No acute abnormality. Pulmonary vascular congestion/interstitial edema. Probable small bilateral pleural effusions. IR TUNNELED CVC PLACE WO SQ PORT/PUMP > 5 YEARS    Result Date: 3/21/2022  PROCEDURE: ULTRASOUND GUIDED VASCULAR ACCESS. FLUOROSCOPY GUIDED PLACEMENT OF A TUNNELED CATHETER. MODERATE CONSCIOUS SEDATION 3/21/2022. HISTORY: ORDERING SYSTEM PROVIDED HISTORY: LIJ TDC not working, needs replaced TECHNOLOGIST PROVIDED HISTORY: Reason for exam:->LIJ TDC not working, needs replaced How many lumens are being requested?->2 What side should this line be placed? ->Either What site is the preferred site? ->Internal Jugular Reason for Exam: replacement SEDATION: Intravenous sedation was administered with continuous monitoring throughout the procedure. In measured doses, a total of intravenous 2 g Ancef, 1 mg intravenous Versed and 50 mcg intravenous fentanyl was administered. My total procedure time with moderate sedation was 25 minutes. ESTIMATED BLOOD LOSS: None. FLUOROSCOPY DOSE AND TYPE OR TIME AND EXPOSURES: 27 seconds, 1 digital image. TECHNIQUE: Informed consent was obtained after a detailed explanation of the procedure including risks, benefits, and alternatives.  Universal protocol was observed. The left neck and chest as well as pre-existing catheter were prepped and draped in sterile fashion using maximum sterile barrier technique. Local anesthesia was achieved with lidocaine. Deep soft tissues between the lower neck and upper chest catheter exit point. The previous dialysis catheter was flushed, sutures removed and exchange wire placed. Moderate traction and gentle teasing of fibrous adhesions allowed for release of the catheter. The catheter was gently extracted and the lower IJ vena puncture site was compressed for hemostasis. The 23 cm dialysis catheter was then placed over the wire and advanced into position under fluoroscopy. The tip terminates in the mid right atrium. Both ports aspirated with easy blood return and were flushed easily with heparinized saline. The ports were locked with heparinized saline. The patient tolerated the procedure well and there were no immediate complications. The external portion of the catheter was sewn to the skin surface and sterilely bandaged. FINDINGS: Fluoroscopy utilized for tunneled left internal jugular vein dialysis catheter terminating in the mid right atrium. Successful fluoroscopy guided right IJ placement of the tunneled dialysis catheter. The catheter is available for immediate utilization. Results for Lea Lamb (MRN [id number]) as of 4/19/2022 19:32   Ref.  Range 3/17/2022 23:12 4/18/2022 12:52 4/18/2022 19:45   Carboxyhemoglobin Latest Ref Range: 0.0 - 1.5 % 1.8 (H) 1.9 (H)    O2 Therapy Unknown Unknown Unknown See comment   Hemoglobin, Art, Extended Latest Ref Range: 13.5 - 17.5 g/dL   11.0 (L)   pH, Arterial Latest Ref Range: 7.350 - 7.450    7.313 (L)   pCO2, Arterial Latest Ref Range: 35.0 - 45.0 mmHg   39.6   pO2, Arterial Latest Ref Range: 75.0 - 108.0 mmHg   44.1 (L)   HCO3, Arterial Latest Ref Range: 21.0 - 29.0 mmol/L   19.6 (L)   TCO2 (calc), Art Latest Ref Range: Not Established mmol/L   20.8   Base Excess, Arterial Latest Ref Range: -3.0 - 3.0 mmol/L   -6.1 (L)   O2 Sat, Arterial Latest Ref Range: >92 %   75.4 (L)   Methemoglobin, Arterial Latest Ref Range: <1.5 %   0.5   Carboxyhgb, Arterial Latest Ref Range: 0.0 - 1.5 %   0.1   pH, Blade Latest Ref Range: 7.350 - 7.450  7.400 7.276 (L)    pCO2, Blade Latest Ref Range: 40.0 - 50.0 mmHg 35.2 (L) 41.2    pO2, Blade Latest Ref Range: 25.0 - 40.0 mmHg 86.2 (H) 55.1 (H)    HCO3, Venous Latest Ref Range: 23.0 - 29.0 mmol/L 21.3 (L) 18.8 (L)    TC02 (Calc), Blade Latest Ref Range: Not Established mmol/L 22 20    Base Excess, Blade Latest Ref Range: -3.0 - 3.0 mmol/L -3.0 -7.6 (L)    MetHgb, Blade Latest Ref Range: <1.5 % 0.4 0.2    O2 Sat, Blade Latest Ref Range: Not Established % 96 83      Results for Gian Barnhart (MRN [id number]) as of 4/19/2022 19:32   Ref. Range 3/19/2022 03:34 3/21/2022 07:36 4/18/2022 12:52   WBC Latest Ref Range: 4.0 - 11.0 K/uL 6.4 7.1 12.3 (H)   RBC Latest Ref Range: 4.20 - 5.90 M/uL 3.10 (L) 3.18 (L) 3.75 (L)   Hemoglobin Quant Latest Ref Range: 13.5 - 17.5 g/dL 9.1 (L) 9.4 (L) 11.2 (L)   Hematocrit Latest Ref Range: 40.5 - 52.5 % 28.1 (L) 28.4 (L) 34.5 (L)   MCV Latest Ref Range: 80.0 - 100.0 fL 90.6 89.3 92.1   MCH Latest Ref Range: 26.0 - 34.0 pg 29.5 29.5 29.8   MCHC Latest Ref Range: 31.0 - 36.0 g/dL 32.5 33.0 32.4   MPV Latest Ref Range: 5.0 - 10.5 fL 7.3 7.7 8.3   RDW Latest Ref Range: 12.4 - 15.4 % 17.5 (H) 17.3 (H) 16.8 (H)   Platelet Count Latest Ref Range: 135 - 450 K/uL 257 260 271   Neutrophils % Latest Units: %   85.4     Results for Gian Barnhart (MRN [id number]) as of 4/19/2022 19:32   Ref. Range 4/18/2022 15:23 4/18/2022 15:32   CULTURE, BLOOD 1 Unknown Rpt    CULTURE, BLOOD 2 Unknown Rpt    Culture, Blood 2 Unknown No Growth to date. Any change in status will be called. SARS-CoV-2, NAAT Latest Ref Range: Not Detected   Not Detected       Echocardiogram:Jan 2022- Summary   Limited only f/u for LVEF and RVF.    The left ventricular systolic function is mildly reduced with an ejection   fraction of 40-45 %. There is hypokinesis of the apex and inferior walls. There is a very small circumferential pericardial effusion noted. No tamponade physiology. The right ventricle is normal in size and function. Sept 2022-    Summary   Technically difficult examination. Image quality limits accurate wall motion   and ejection fraction analysis. The left ventricular systolic function is moderately reduced with an   ejection fraction of 40-45 %. Mild concentric left ventricular hypertrophy. Definity contrast administered with no evidence of left ventricular mass or   thrombus noted. Moderate global hypokinesis with regional variation. Grade II diastolic dysfunction with elevated filing pressure. Compared to last echo on 1/5/2021 (EF 55%), left ventricle systolic function   has decreased. The left atrium is mildly dilated. Individual aortic valve leaflets are not clearly visualized. Normally functioning TAVR 29 mm Langford vanita S3 bioprosthetic valve in   aortic position appears well seated with a max velocity of 2.27 m/sec,   maximum gradient of 21 mmHg and a mean gradient of 13 mmHg. Trace aortic valve regurgitation. Mild mitral regurgitation. A bubble study was performed and fails to show evidence of shunting. PFT:2016-INDICATIONS:  COPD.     1.  SPIROMETRY:  The FEV-1 is 1.71 L which is 44% predicted.  The FEV-1/FVC  ratio is normal.  Inhaled bronchodilators are given. There is significant  improvement in the FEV-1 at 29%.     2.  LUNG VOLUMES:  Total lung capacity is normal at 6.08 L or 86% predicted.     3.  DIFFUSION CAPACITY:  DLCO is 28.26 mL per minute per mmHg which is 79%  predicted.       4.  FLOW VOLUME LOOP:  Does not show an obvious obstructive pattern. No variable or fixed obstruction pattern on the flow volume loop done in 2016     IMPRESSION:  There are symmetric reductions in the FEV-1 and the forced vital  capacity. Leobardo Boucher is also broncho reactivity.  Air trapping is present.  Mild  reduction in diffusion capacity is present.  While not a typical obstructive  pattern, given the tobacco history, smoking related lung disease is still the  most likely culprit here. Gram Stain Result  Abnormal   4+ RBC's   2+ WBC's (Polymorphonuclear)   1+ Gram positive cocci   [facility]   Culture Surgical Specimen submitted on swab, interpret with caution Abnormal   [address] [facility]   Anaerobic Culture Mixed anaerobic growth present  [address] [facility]   Organism Staphylococcus epidermidis Abnormal   [address] [facility]   Culture Surgical Isolated from [facility]  [address] [facility]          Susceptibility      Staphylococcus epidermidis (1)    Antibiotic Interpretation Microscan  Method Status    clindamycin Sensitive <=0.25 mcg/mL BACTERIAL SUSCEPTIBILITY PANEL BY MARCE     erythromycin Resistant >=8 mcg/mL BACTERIAL SUSCEPTIBILITY PANEL BY MARCE     oxacillin Resistant >=4 mcg/mL BACTERIAL SUSCEPTIBILITY PANEL BY MARCE     tetracycline Sensitive 2 mcg/mL BACTERIAL SUSCEPTIBILITY PANEL BY MARCE     trimethoprim-sulfamethoxazole Sensitive <=10 mcg/mL BACTERIAL SUSCEPTIBILITY PANEL BY MARCE     vancomycin Sensitive 2 mcg/mL BACTERIAL SUSCEPTIBILITY PANEL BY MARCE                    Assessment:  Active Problems:    S/P TAVR (transcatheter aortic valve replacement)    Former smoker    Cardiomyopathy ([address]    ESRD on dialysis ([facility]    Acute diastolic CHF (congestive heart failure) (HCC)    Acute hypoxemic respiratory failure (HCC)    Acute pulmonary edema (HCC)    Obesity, Class II, BMI 35-39.9    Grade II diastolic dysfunction  Resolved Problems:    * No resolved hospital problems.  *          Plan:     · BIPAP with oxygen supplementation  to keep saturation being 90-94% only  · Please titrate down the oxygen as per the above parameters  · BIPAP settings reviewed   · BIPAP seetings changed   · BIPAP on intermittent basis -needs to continue and be complaint with it at home   · Pulmonary toilet  · Patient's x-ray chest shows patient to have mild pulmonary venous congestion and patient CT of the chest did not show any pneumonic process but has a lung nodule which is new along with reactive adenopathy  · Patient does not need any antibiotics from pulmonary standpoint of view  · HD as per nephrology-getting one when seen   · If patient has worsening resp distress inspite of fluid removal on HD and BIPAP -patient may require invasive ventilation   · Patient's PFT flow-volume loop in 2016 did not show any significant intrathoracic or extrathoracic or fixed obstruction at that time  · Patient will benefit from a repeat PFT as an outpatient  · Antihypertensives as per IM/nephrology   · Patient had perineal wound infection in past -IM to decide about continuation of vancomycin   · Bronchodilators  · We will hold off on any systemic steroids at this time  · Lantus insulin as per blood glucose monitoring as per IM  · Blood glucose monitoring with sliding scale insulin  · Trend hemodynamics and cardiac rhythm closely  · Diet and life style modifications  · Patient needs to lose weight   · On Eliquis   · PUD prophylaxis  · Compliance is important           Electronically signed by:  Mitchell Cohen MD    4/19/2022    7:37 PM.

## 2022-04-19 NOTE — CONSULTS
RD received consult for CHF education. Pt in Dialysis at time of visit, left handouts at bedside. Will follow up when appropriate. Has previously received diet education multiple times on previous admissions. Will continue to monitor per nutrition standards of care.      Alexa Lancaster LD 4/19/2022 12:29 PM  Office: [phone]; [facility]: [id number]

## 2022-04-19 NOTE — PROGRESS NOTES
04/19/22 1226   NIV Type   NIV Started/Stopped On   Equipment Type v60   Mode Bilevel   Mask Type Full face mask   Mask Size Large   Settings/Measurements   IPAP 15 cmH20  (Pt on 15 when I entered the room. )   CPAP/EPAP 6 cmH2O   Rate Ordered 10   Resp 17   FiO2  35 %   Vt Exhaled 944 mL   Minute Volume 16.6 Liters   Mask Leak (lpm) 24 lpm   Comfort Level Good   Using Accessory Muscles No   SpO2 98   Alarm Settings   Alarms On Y   Press Low Alarm 6 cmH2O   High Pressure Alarm 30 cmH2O   Apnea (secs) 20 secs   Resp Rate Low Alarm 6   High Respiratory Rate 40 br/min

## 2022-04-19 NOTE — FLOWSHEET NOTE
Treatment time: 3.5 hours  Net UF: 3000 ml     Pre weight: 111 kg   Post weight: 108 kg  EDW: TBD kg     Access used: [facility] TDC  Access function: Poor, positional with -350 ml/min     Medications or blood products given: N/A     Regular outpatient schedule: MW     Summary of response to treatment:      04/19/22 1233   Vital Signs   BP (!) 163/74   Temp 98 °F (36.7 °C)   Pulse 104   Resp 22   Weight 238 lb 1.6 oz (108 kg)   Percent Weight Change -2.7   Pain Assessment   Pain Assessment 0-10   Pain Level 0   Post-Hemodialysis Assessment   Post-Treatment Procedures Blood returned;Catheter capped, clamped and heparinized x 2 ports   Machine Disinfection Process Acid/Vinegar Clean;Heat Disinfect; Exterior Machine Disinfection   Rinseback Volume (ml) 400 ml   Total Liters Processed (l/min) 45 l/min   Dialyzer Clearance Heavily streaked   Duration of Treatment (minutes) 210 minutes   Heparin amount administered during treatment (units) 0 units   Hemodialysis Intake (ml) 400 ml   Hemodialysis Output (ml) 3400 ml   NET Removed (ml) 3000 ml   Tolerated Treatment Poor   Copy of dialysis treatment record placed in chart, to be scanned into EMR. Pt severely anxious at beginning of treatment, requiring bipap placement. Continuous pulse oximetry placed, O2 sat >92%.  Report called to Maribel Major RN

## 2022-04-19 NOTE — PROGRESS NOTES
04/19/22 0327   NIV Type   $NIV $Daily Charge   NIV Started/Stopped On   Equipment Type v60   Mode Bilevel   Mask Type Full face mask   Mask Size Large   Settings/Measurements   IPAP 18 cmH20   CPAP/EPAP 6 cmH2O   Rate Ordered 8   Resp 11   FiO2  35 %   Vt Exhaled 952 mL   Mask Leak (lpm) 19 lpm   Comfort Level Good   Using Accessory Muscles No   SpO2 100   Patient Observation   Observations mepilex on nose   Alarm Settings   Alarms On Y

## 2022-04-19 NOTE — CONSULTS
Thank you to requesting provider:  Dr. Hung Koenig  , for asking us to see Geraldo Ervin  Reason for consultation:  ESRD  Chief Complaint:  Shortness of breath     History of Presenting Illness      44 y/o with history of ESRD due to diabetic nephropathy who was rejected for kidney transplant due to poor control of chronic medical conditions and disease burden admitted with shortness of breath. He now does dialysis with a TDC after having his fistula ligated due to needle phobia. He failed PD as could not control volume status. He frequently missed dialysis or does a partial treatment. Now on bilevel and has elevated BP. Recent prolonged admission for cellulitis. Past Medical/Surgical History      Active Ambulatory Problems     Diagnosis Date Noted    Sepsis without acute organ dysfunction ([address] 12/25/2012    CHF (congestive heart failure) ([address] 05/14/2013    Asthma-COPD overlap syndrome ([address] 05/14/2013    Coronary artery disease involving native coronary artery of native heart without angina pectoris 05/14/2013    PVD (peripheral vascular disease) ([address] 05/14/2013    Bicuspid aortic valve 06/03/2013    Bilateral hilar adenopathy syndrome 06/03/2013    Claudication in peripheral vascular disease ([address] 06/26/2013    HTN (hypertension), benign 11/14/2013    Diabetic neuropathy ([address] 11/14/2013    Type 2 diabetes, uncontrolled, with neuropathy ([address] 03/04/2014    Passive smoke exposure 05/30/2014    Depression with anxiety 06/05/2014    Pneumonia of right upper lobe due to infectious organism 03/28/2015    Obesity (BMI 30-39. 9)     ZAINAB on CPAP 02/11/2016    Degeneration of lumbar or lumbosacral intervertebral disc 03/18/2016    Lumbar radiculopathy 03/18/2016    Lumbosacral spondylosis without myelopathy 38/53/2320    Biliary colic 00/94/7295    Symptomatic cholelithiasis 01/04/2017    Gastroparesis due to DM (HCC)     Angina, class IV (HCC)     Dyspnea     Dyslipidemia     Acute on chronic combined systolic and diastolic heart failure (HCC)     Ischemic cardiomyopathy     Tobacco abuse 05/21/2017    CVA (cerebral vascular accident) ([address] 05/21/2017    History of CVA (cerebrovascular accident)     Type 2 diabetes mellitus without complication, without long-term current use of insulin ([facility])     ZAINAB (obstructive sleep apnea)     Pleural effusion     Chronic anemia     Nonrheumatic aortic valve stenosis     Mucus plugging of bronchi     Hemodialysis-associated hypotension 11/22/2017    ESRD (end stage renal disease) ([address] 11/22/2017    Hypotension due to drugs 11/25/2017    Acute diastolic CHF (congestive heart failure) ([address] 03/18/2018    Neuromuscular disorder ([facility])     Renovascular hypertension     Mixed hyperlipidemia     Cigarette nicotine dependence in remission     Pulmonary edema 11/09/2018    Fluid overload 11/09/2018    Anemia of chronic disease 11/12/2018    SOB (shortness of breath) on exertion 12/24/2018    Steal syndrome of dialysis vascular access ([facility])     Chronic, continuous use of opioids 04/15/2019    Chronic bronchitis ([address] 05/21/2019    Nasal congestion 05/21/2019    Hypercholesteremia 07/30/2019    Bradycardia 10/19/2019    S/P TAVR (transcatheter aortic valve replacement) 10/19/2019    Syncope and collapse 10/19/2019    PAF (paroxysmal atrial fibrillation) ([facility])     Bilateral leg weakness 12/04/2020    GBS (Guillain-Douglas syndrome) ([facility])     Sinus pause     Weakness of both lower extremities 12/30/2020    Sinus bradycardia 12/30/2020    Ataxia     Peripheral vascular occlusive disease ([address] 01/02/2021    Cellulitis of right foot 01/29/2021    Iliac artery occlusion, right ([facility])     Cellulitis and abscess of hand 04/24/2021    Type 2 diabetes mellitus with hyperglycemia ([address] 06/27/2021    Acute encephalopathy 06/27/2021    Acute hypoxemic respiratory failure ([address] 08/12/2021    Acute CVA (cerebrovascular accident) ([address] 09/07/2021    Speech problem     Urinary tract infection with hematuria     Respiratory failure with hypoxia ([address] 11/29/2021    Acute respiratory failure with hypercapnia (HCC) 11/30/2021    Acute pulmonary edema (HCC) 11/30/2021    Obesity, Class II, BMI 35-39.9 11/30/2021    Grade II diastolic dysfunction 98/26/5539    Shock circulatory ([address] 11/30/2021    Smoker 11/30/2021    Normocytic normochromic anemia 11/30/2021    NSTEMI (non-ST elevated myocardial infarction) ([address] 01/12/2022    SIRS (systemic inflammatory response syndrome) ([facility] 02/21/2022    Atrial flutter (HCC)     Liberty-rectal abscess     Somnolence     Pulmonary edema with diastolic CHF, NYHA class 3 ([address] 03/17/2022     Resolved Ambulatory Problems     Diagnosis Date Noted    CHF, acute on chronic ([address] 05/12/2013    Leukocytosis 05/14/2013    PVD (peripheral vascular disease) ([address] 06/26/2013    Abscess 07/22/2013    Sebaceous cyst 07/22/2013    Wound infection after surgery 08/30/2013    Postop check 08/30/2013    Insomnia 06/05/2014    Sore throat 11/03/2014    Sinusitis, acute 02/05/2015    Abscess, abdomen 03/10/2015    Pre-operative cardiovascular examination     Elevated troponin     Non morbid obesity due to excess calories     Tobacco abuse 03/25/2020    Pain     ESRD on peritoneal dialysis ([address] 11/09/2018    Preoperative cardiovascular examination 04/04/2019    Syncope 10/19/2019     Past Medical History:   Diagnosis Date    Ambulatory dysfunction     Aortic stenosis     Arthritis     Asthma     CAD (coronary artery disease)     Cardiomyopathy ([address] 04/19/2019    Chronic pain     COPD (chronic obstructive pulmonary disease) ([address]     Depression     Diabetes mellitus (HCC)     Difficult intravenous access     Emphysema of lung ([address]     ESRD (end stage renal disease) on dialysis ([address]     Fear of needles     Gastric ulcer     GERD (gastroesophageal reflux disease)     Heart valve problem  Hemodialysis patient ([address] [facility]     History of spinal fracture     Hx of blood clots     Hyperlipidemia     Hypertension     MI (myocardial infarction) ([address] 2019    Numbness and tingling in left arm     Pneumonia     PONV (postoperative nausea and vomiting)     Prolonged emergence from general anesthesia     Sleep apnea     Stroke ([address]     TIA (transient ischemic attack)     Unspecified diseases of blood and blood-forming organs          Review of Systems     Constitutional:  No weight loss, no fever/chills  Eyes:  No eye pain, no eye redness  Cardiovascular:  No chest pain, + edema  Respiratory:  No hemoptysis, no stridor  Gastrointestinal:  No blood in stool, no n/v, no diarrhea  Genitoruinary:  No hematuria, not much urination   Musculoskeletal:  No joint swelling, no redness  Integumentary:  No Rash, no itching  Neurological:  No focal weakness, No new sensory deficit  Psychiatric:  No depression, no confusion  Endocrine:  No polyuria, no polydipsia       Medications      Reviewed in EMR     Allergies     Morphine      Family History       Negative for Kidney Disease    Social History      Social History     Socioeconomic History    Marital status:      Spouse name: None    Number of children: None    Years of education: None    Highest education level: None   Occupational History    None   Tobacco Use    Smoking status: Former Smoker     Packs/day: 0.50     Years: 33.00     Pack years: 16.50     Types: Cigarettes     Quit date: 2020     Years since quittin.9    Smokeless tobacco: Never Used    Tobacco comment: [facility] quit 2021   Vaping Use    Vaping Use: Never used   Substance and Sexual Activity    Alcohol use: Not Currently     Alcohol/week: 0.0 standard drinks     Comment: occ    Drug use: No    Sexual activity: Yes     Partners: Female     Comment:    Other Topics Concern    None   Social History Narrative    None     Social Determinants of Health Financial Resource Strain:     Difficulty of Paying Living Expenses: Not on file   Food Insecurity:     Worried About Running Out of Food in the Last Year: Not on file    Louise of Food in the Last Year: Not on file   Transportation Needs:     Lack of Transportation (Medical): Not on file    Lack of Transportation (Non-Medical): Not on file   Physical Activity:     Days of Exercise per Week: Not on file    Minutes of Exercise per Session: Not on file   Stress:     Feeling of Stress : Not on file   Social Connections:     Frequency of Communication with Friends and Family: Not on file    Frequency of Social Gatherings with Friends and Family: Not on file    Attends Mu-ism Services: Not on file    Active Member of [address] [place] [address] [employer] or Organizations: Not on file    Attends Club or Organization Meetings: Not on file    Marital Status: Not on file   Intimate Partner Violence:     Fear of Current or Ex-Partner: Not on file    Emotionally Abused: Not on file    Physically Abused: Not on file    Sexually Abused: Not on file   Housing Stability:     Unable to Pay for Housing in the Last Year: Not on file    Number of [place] in the Last Year: Not on file    Unstable Housing in the Last Year: Not on file       Physical Exam     Blood pressure (!) 181/80, pulse 88, temperature 98 °F (36.7 °C), temperature source Axillary, resp. rate 16, height 5' 6\" (1.676 m), weight 250 lb 11.2 oz (113.7 kg), SpO2 100 %.     General:  ill-appearing, on bilevel   HEENT:  PERRL, EOMI  Neck:  Supple, normal range of movement  Chest:  Mild distress, on bilevel   CV:  Regular, no rub   Abdomen:  NTND, soft, +BS, no hepatosplenomegaly  Extremities:  + peripheral edema  Neurological:  Moving all four extremities, CN II-XII grossly intact  Lymphatics:  No palpable lymph nodes  Skin:  No rash, no jaundice  Psychiatric:  Flat affect, somnolent  Access:  [facility]     Data     Recent Labs     04/18/22  1252   WBC 12.3*   HGB 11.2*   HCT 34.5*   MCV 92.1        Recent Labs     04/18/22  1252   *   K 4.1   CL 95*   CO2 19*   GLUCOSE 194*   BUN 85*   CREATININE 10.9*   LABGLOM 5*   GFRAA 6*       Assessment:    ESRD:  Due to diabetic nephropathy  - Schedule:  MWF at  [address] of breath:  - Heart failure with poor fluid management on dialysis due to frequent missed treatments and high IDWG    Hyponatremia:  Due to high IDWG    Anemia of chronic disease-CKD:   - Hb is at target    Hypertension:  Range elevated, volume and stress mediated    Plan:    Urgent dialysis today for 2 hours and UF 3 kg  Dialysis tomorrow and Wednesday to get back to schedule and challenge target weight  Typically if we and get him euvolemic, BP and hypoxia improve     -----------------------------  Roby Jacobs M.D.   [facility]

## 2022-04-19 NOTE — PROGRESS NOTES
04/18/22 0930   NIV Type   NIV Started/Stopped On   Equipment Type v60   Mode Bilevel   Mask Type Full face mask   Mask Size Large   Settings/Measurements   IPAP 18 cmH20   CPAP/EPAP 6 cmH2O   Rate Ordered 8   Resp 23   FiO2  40 %   Vt Exhaled 1052 mL   Mask Leak (lpm) 60 lpm   Comfort Level Good   Using Accessory Muscles No   SpO2 100   Breath Sounds   Right Upper Lobe Clear   Right Middle Lobe Diminished   Right Lower Lobe Clear   Left Upper Lobe Diminished   Left Lower Lobe Diminished   Patient Observation   Observations mepilex on nose   Alarm Settings   Alarms On Y   Press Low Alarm 6 cmH2O   High Pressure Alarm 30 cmH2O

## 2022-04-19 NOTE — FLOWSHEET NOTE
Patient assessed resting in bed on BIPAP. Patient updated on plan of care including having an HD session this AM. Patient noted to be hypertensive - will administer BP medication. All other vitals stable as charted. Per RT, patient is cleared to go to dialysis off of BIPAP as his respiratory issues are due to excess fluid which will be pulled off during HD. Patient's bed in low, locked position. No further requests at this time.         04/19/22 0815   Vital Signs   Temp 98 °F (36.7 °C)   Temp Source Axillary   Pulse 85   Heart Rate Source Monitor   Resp 18   BP (!) 161/103   BP Location Right upper arm   MAP (mmHg) 121   Patient Position Left side   Level of Consciousness Alert (0)   MEWS Score 1   Patient Currently in Pain Denies   Pain Assessment   Pain Assessment 0-10   Pain Level 0   Oxygen Therapy   SpO2 100 %   O2 Device PAP (positive airway pressure)

## 2022-04-19 NOTE — PROGRESS NOTES
Pt hypertensive 180/81 at this time. Perfect serve msg sent to [facility]. Received orders for 5mg IV lopressor. Will continue to monitor.

## 2022-04-19 NOTE — CARE COORDINATION
CASE MANAGEMENT INITIAL ASSESSMENT      Reviewed chart and completed assessment with patient:  Family present: wife at bedside  Explained Case Management role/services. To patient and wife    Primary contact information:see below    Health Care Decision Maker :   Primary Decision Maker: Kannan Durham - [phone] both patient and wife's phone are currently disconnected. Can this person be reached and be able to respond quickly, such as within a few minutes or hours? No,  phones disconnected no other contacts to list    Admit date/status:inpatient  Diagnosis:Respiratory failure  Is this a Readmission?:  Yes      Chestine Sharps of [address] required for SNF: Yes       3 night stay required: No    Living arrangements, Adls, care needs, prior to admission:Lives at home with spouse. Uses electric scooter and walker    Durable Medical Equipment at home:  Walker__Cane__RTS__ BSC__Shower Chair__  02__ HHN__ CPAP__  BiPap__  Hospital Bed__ W/C___ Other_____    Services in the home and/or outpatient, prior to admission:outpt HD    Dialysis: MWF [address]    Transportation needs: may need transport home arranged wife cannot push a manual wheelchair and electric scooter does not fit in their house    PT/OT recs:not seen yet    Hospital Exemption Notification (HEN):N/A    Barriers to discharge:Clinical improvement    Plan/comments:Patient and wife state they were supposed to get home oxygen with [facility] last admission and [facility] called them after discharge and ask them a few questions and stated he did not qualify. He attributes his readmission to the fact that they wont give him oxygen at home. He Also states home care was supposed to come in the home and they never showed up. Per CM notes [facility] was arranged last admit and due them not being able to reach anyone to schedule start of care they never opened him up for services.  He is open to using another company for home O2 even though he has a BiPAP with [employer]. Referral initiated to [facility]. Caryn Tammy will follow for home O2 needs. Wife also inquiring about trying to get and electric wheelchair arranged. He has electric scooter but it does not fit on their house and she cannot push him in a manual chair. Will follow. Currently on 3 liters.      ECOC on chart for MD signature

## 2022-04-19 NOTE — PROGRESS NOTES
Patient is refusing all lab draws at this time. Patient educated on the importance of having his labs drawn. Patient still refuses, states \"I don't care, it hurts and you draw my blood too much. \" MD aware.

## 2022-04-19 NOTE — PROGRESS NOTES
Progress Note    HISTORY     CC:  Shortness of breath           We are following for ESRD on RRT       Subjective/   HPI:  Seen on dialysis. BP dropping with UF, getting 3 kg UF. Still anxious and short of breath.   No edema     ROS:  Constitutional:  No fevers, No Chills, + weakness  Cardiovascular:  No palpations, no edema  Respiratory:  No wheezing, + SOB  Skin:  No rash, no itching  :  No hematuria, no dysuria     Social Hx:  No Family at the bedside     Past Medical and Surgical History:  - Reviewed, no changes     EXAM       Objective/     Vitals:    04/19/22 1226 04/19/22 1233 04/19/22 1329 04/19/22 1638   BP:  (!) 163/74 (!) 157/83 (!) 105/59   Pulse:  104 114 88   Resp: 17 22 24 20   Temp:  98 °F (36.7 °C) 97.3 °F (36.3 °C) 98.1 °F (36.7 °C)   TempSrc:   Oral Oral   SpO2:   100% 98%   Weight:  238 lb 1.6 oz (108 kg)     Height:         24HR INTAKE/OUTPUT:      Intake/Output Summary (Last 24 hours) at 4/19/2022 1823  Last data filed at 4/19/2022 1233  Gross per 24 hour   Intake 1600 ml   Output 7450 ml   Net -5850 ml     Constitutional:  Ill appearing   Eyes:  Pupils reactive, sclera clear   Neck:  Normal thyroid, no masses   Cardiovascular:  Regular, no rub  Respiratory:  + distress, on bilevel   Psychiatry:  Flat mood/affect, somnolent   Abdomen: +bs, soft, nt, no masses   Musculoskeletal: No LE edema, no clubbing   Lymphatics:  No LAD in neck, no supraclavicular nodes   Access:  [facility]      MEDICAL DECISION MAKING       Data/  Recent Labs     04/18/22  1252   WBC 12.3*   HGB 11.2*   HCT 34.5*   MCV 92.1        Recent Labs     04/18/22  1252   *   K 4.1   CL 95*   CO2 19*   GLUCOSE 194*   BUN 85*   CREATININE 10.9*   LABGLOM 5*   GFRAA 6*       Assessment/     ESRD:  Due to diabetic nephropathy  - Schedule:  MWF at [address] of breath:  - Heart failure with poor fluid management on dialysis due to frequent missed treatments and high IDWG  - Improving oxygen requirements with UF  - Noted ABG last night      Hyponatremia:  Due to high IDWG     Anemia of chronic disease-CKD:   - Hb is at target     Hypertension:  Range elevated, volume and stress mediated. Improved nicely with dialysis     Plan/     Down to 108 kg, this is pretty good for him.   Low BP with dialysis so this is likely his target weight   HD tomorrow to get back on schedule  Follow clinical status and monitor BP  Usually suffers from delirium in the hospital     -----------------------------  Luanne Ogden M.D.   [facility]

## 2022-04-20 LAB
ALBUMIN SERPL-MCNC: 3.9 G/DL (ref 3.4–5)
ANION GAP SERPL CALCULATED.3IONS-SCNC: 19 MMOL/L (ref 3–16)
BUN BLDV-MCNC: 58 MG/DL (ref 7–20)
CALCIUM SERPL-MCNC: 9.4 MG/DL (ref 8.3–10.6)
CHLORIDE BLD-SCNC: 95 MMOL/L (ref 99–110)
CO2: 19 MMOL/L (ref 21–32)
CREAT SERPL-MCNC: 8.9 MG/DL (ref 0.9–1.3)
GFR AFRICAN AMERICAN: 8
GFR NON-AFRICAN AMERICAN: 6
GLUCOSE BLD-MCNC: 147 MG/DL (ref 70–99)
GLUCOSE BLD-MCNC: 163 MG/DL (ref 70–99)
GLUCOSE BLD-MCNC: 190 MG/DL (ref 70–99)
GLUCOSE BLD-MCNC: 226 MG/DL (ref 70–99)
HCT VFR BLD CALC: 31.5 % (ref 40.5–52.5)
HEMOGLOBIN: 10.1 G/DL (ref 13.5–17.5)
MCH RBC QN AUTO: 29.4 PG (ref 26–34)
MCHC RBC AUTO-ENTMCNC: 32 G/DL (ref 31–36)
MCV RBC AUTO: 92 FL (ref 80–100)
PDW BLD-RTO: 16.1 % (ref 12.4–15.4)
PERFORMED ON: ABNORMAL
PHOSPHORUS: 6.8 MG/DL (ref 2.5–4.9)
PLATELET # BLD: 191 K/UL (ref 135–450)
PMV BLD AUTO: 8.9 FL (ref 5–10.5)
POTASSIUM SERPL-SCNC: 4.5 MMOL/L (ref 3.5–5.1)
RBC # BLD: 3.42 M/UL (ref 4.2–5.9)
SODIUM BLD-SCNC: 133 MMOL/L (ref 136–145)
WBC # BLD: 6.9 K/UL (ref 4–11)

## 2022-04-20 PROCEDURE — 6370000000 HC RX 637 (ALT 250 FOR IP): Performed by: HOSPITALIST

## 2022-04-20 PROCEDURE — 6370000000 HC RX 637 (ALT 250 FOR IP): Performed by: INTERNAL MEDICINE

## 2022-04-20 PROCEDURE — 2060000000 HC ICU INTERMEDIATE R&B

## 2022-04-20 PROCEDURE — 85027 COMPLETE CBC AUTOMATED: CPT

## 2022-04-20 PROCEDURE — 99233 SBSQ HOSP IP/OBS HIGH 50: CPT | Performed by: INTERNAL MEDICINE

## 2022-04-20 PROCEDURE — 94660 CPAP INITIATION&MGMT: CPT

## 2022-04-20 PROCEDURE — 90935 HEMODIALYSIS ONE EVALUATION: CPT

## 2022-04-20 PROCEDURE — 80069 RENAL FUNCTION PANEL: CPT

## 2022-04-20 RX ORDER — INSULIN LISPRO 100 [IU]/ML
0-3 INJECTION, SOLUTION INTRAVENOUS; SUBCUTANEOUS NIGHTLY
Status: DISCONTINUED | OUTPATIENT
Start: 2022-04-20 | End: 2022-04-27 | Stop reason: HOSPADM

## 2022-04-20 RX ORDER — QUETIAPINE FUMARATE 25 MG/1
25 TABLET, FILM COATED ORAL NIGHTLY
Status: DISCONTINUED | OUTPATIENT
Start: 2022-04-20 | End: 2022-04-27 | Stop reason: HOSPADM

## 2022-04-20 RX ORDER — INSULIN LISPRO 100 [IU]/ML
0-6 INJECTION, SOLUTION INTRAVENOUS; SUBCUTANEOUS
Status: DISCONTINUED | OUTPATIENT
Start: 2022-04-20 | End: 2022-04-27 | Stop reason: HOSPADM

## 2022-04-20 RX ADMIN — PANTOPRAZOLE SODIUM 40 MG: 40 TABLET, DELAYED RELEASE ORAL at 07:02

## 2022-04-20 RX ADMIN — GABAPENTIN 100 MG: 100 CAPSULE ORAL at 13:20

## 2022-04-20 RX ADMIN — METOPROLOL SUCCINATE 50 MG: 50 TABLET, EXTENDED RELEASE ORAL at 20:58

## 2022-04-20 RX ADMIN — OXYCODONE AND ACETAMINOPHEN 1 TABLET: 7.5; 325 TABLET ORAL at 16:55

## 2022-04-20 RX ADMIN — GABAPENTIN 100 MG: 100 CAPSULE ORAL at 20:57

## 2022-04-20 RX ADMIN — QUETIAPINE FUMARATE 25 MG: 25 TABLET ORAL at 20:57

## 2022-04-20 RX ADMIN — INSULIN LISPRO 2 UNITS: 100 INJECTION, SOLUTION INTRAVENOUS; SUBCUTANEOUS at 17:34

## 2022-04-20 RX ADMIN — OXYCODONE AND ACETAMINOPHEN 1 TABLET: 7.5; 325 TABLET ORAL at 07:02

## 2022-04-20 RX ADMIN — INSULIN LISPRO 1 UNITS: 100 INJECTION, SOLUTION INTRAVENOUS; SUBCUTANEOUS at 13:21

## 2022-04-20 ASSESSMENT — PAIN SCALES - GENERAL
PAINLEVEL_OUTOF10: 10
PAINLEVEL_OUTOF10: 5

## 2022-04-20 ASSESSMENT — PAIN DESCRIPTION - LOCATION
LOCATION: BACK
LOCATION: BACK

## 2022-04-20 NOTE — PROGRESS NOTES
04/20/22 1958   NIV Type   NIV Started/Stopped On   Equipment Type V60   Mode Bilevel   Mask Type Full face mask   Mask Size Large   Settings/Measurements   IPAP 15 cmH20   CPAP/EPAP 6 cmH2O   Rate Ordered 10   Resp 16   FiO2  35 %   Vt Exhaled 688 mL   Minute Volume 7.7 Liters   Mask Leak (lpm) 29 lpm   Comfort Level Good   Using Accessory Muscles No   SpO2 98   Alarm Settings   Alarms On Y

## 2022-04-20 NOTE — PLAN OF CARE
Problem: Falls - Risk of:  Goal: Will remain free from falls  Description: Will remain free from falls  4/19/2022 2339 by Harinder Abreu RN  Note: No attempts to get out of bed without assistance. Pt reminded to call for assist before ambulating. Nonskid socks on. Bed locked and in lowest position. Side rails up x3. Exit alarm in use. Call light in reach. Remains free from injury. Will cont to monitor safety.

## 2022-04-20 NOTE — PROGRESS NOTES
04/19/22 2012   NIV Type   NIV Started/Stopped On   Equipment Type v60   Mode Bilevel   Mask Type Full face mask   Mask Size Large   Settings/Measurements   IPAP 2 cmH20   CPAP/EPAP 3 cmH2O   Rate Ordered 10   Resp 15   FiO2  35 %   Vt Exhaled 1035 mL   Minute Volume 15.8 Liters   Mask Leak (lpm) 56 lpm   Comfort Level Good   Using Accessory Muscles No   SpO2 99

## 2022-04-20 NOTE — PROGRESS NOTES
Pt A/Ox4, VSS, afebrile. Pt c/o lower back pain. Prn percocet given. No sob noted. sp02 WNL on 3L NC. Will cont to monitor.

## 2022-04-20 NOTE — CARE COORDINATION
Patient is an HD patient established at [address] [place] [address],  [address] MWF 1130. Has hx of noncompliance and misses HD some and then gets fluid overloaded and ends up admitted. Wife and patient have no phone both of their phones are disconnected. Home care did not admit for services last discharge due to not being able to reach them. Did not qualify for O2 with [facility] last admit per patient. [facility] Can try to get home care but due to hx of noncompliance and pay source could have potential barrier. Referral back to [facility] to follow.

## 2022-04-20 NOTE — PROGRESS NOTES
Will evaluate [facility] tomorrow 4/21/22. Continue to hold blood thinners, NPO at midnight and will get PT/INR tomorrow. Nurse made aware.

## 2022-04-20 NOTE — PROGRESS NOTES
04/20/22 0039   NIV Type   $NIV $Daily Charge   Skin Assessment Clean, dry, & intact   Skin Protection for O2 Device Yes   Location Nose   Equipment Type v60   Mode Bilevel   Mask Type Full face mask   Mask Size Large   Settings/Measurements   IPAP 15 cmH20   CPAP/EPAP 6 cmH2O   Resp 19   FiO2  35 %   Vt Exhaled 772 mL   Minute Volume 13.5 Liters   Mask Leak (lpm) 40 lpm   Comfort Level Good   Using Accessory Muscles No

## 2022-04-20 NOTE — ACP (ADVANCE CARE PLANNING)
Advance Care Planning     Advance Care Planning Activator (Inpatient)  Conversation Note      Date of ACP Conversation: 4/20/2022     Conversation Conducted with: Patient    ACP Activator: Km Varner RN      Health Care Decision Maker:     Current Designated Health Care Decision Maker:     Primary Decision Maker: AnnCrystal sheehan - [facility] - [phone]      Care Preferences    Ventilation: \"If you were in your present state of health and suddenly became very ill and were unable to breathe on your own, what would your preference be about the use of a ventilator (breathing machine) if it were available to you? \"      Would the patient desire the use of ventilator (breathing machine)?: yes    \"If your health worsens and it becomes clear that your chance of recovery is unlikely, what would your preference be about the use of a ventilator (breathing machine) if it were available to you? \"     Would the patient desire the use of ventilator (breathing machine)?: No      Resuscitation  \"CPR works best to restart the heart when there is a sudden event, like a heart attack, in someone who is otherwise healthy. Unfortunately, CPR does not typically restart the heart for people who have serious health conditions or who are very sick. \"    \"In the event your heart stopped as a result of an underlying serious health condition, would you want attempts to be made to restart your heart (answer \"yes\" for attempt to resuscitate) or would you prefer a natural death (answer \"no\" for do not attempt to resuscitate)? \" yes

## 2022-04-20 NOTE — PROGRESS NOTES
Progress Note    HISTORY     CC:  Shortness of breath           We are following for ESRD on RRT       Subjective/   HPI:  Breathing is much better. BP improved. Dialysis 3 days in a row but the catheter is not working despite cath flow. No new complains. Fairly alert.     ROS:  Constitutional:  No fevers, No Chills, + weakness  Cardiovascular:  No palpations, no edema  Respiratory:  No wheezing, + SOB  Skin:  No rash, no itching  :  No hematuria, no dysuria     Social Hx:  No Family at the bedside     Past Medical and Surgical History:  - Reviewed, no changes     EXAM       Objective/     Vitals:    04/20/22 0448 04/20/22 0700 04/20/22 0749 04/20/22 1116   BP: 139/82  130/78 (!) 153/85   Pulse: 87  65 68   Resp: 17  18 18   Temp: 97.5 °F (36.4 °C)  97.6 °F (36.4 °C) 98 °F (36.7 °C)   TempSrc: Axillary  Oral Oral   SpO2: 99%   93%   Weight:  253 lb 8.5 oz (115 kg) 233 lb 11 oz (106 kg) 232 lb 9.4 oz (105.5 kg)   Height:         24HR INTAKE/OUTPUT:      Intake/Output Summary (Last 24 hours) at 4/20/2022 1522  Last data filed at 4/20/2022 1116  Gross per 24 hour   Intake 1040 ml   Output 1500 ml   Net -460 ml     Constitutional:  Ill appearing   Eyes:  Pupils reactive, sclera clear   Neck:  Normal thyroid, no masses   Cardiovascular:  Regular, no rub  Respiratory:  + distress, on bilevel   Psychiatry:  Flat mood/affect, somnolent   Abdomen: +bs, soft, nt, no masses   Musculoskeletal: No LE edema, no clubbing   Lymphatics:  No LAD in neck, no supraclavicular nodes   Access:  [facility]      MEDICAL DECISION MAKING       Data/  Recent Labs     04/18/22  1252 04/20/22  0755   WBC 12.3* 6.9   HGB 11.2* 10.1*   HCT 34.5* 31.5*   MCV 92.1 92.0    191     Recent Labs     04/18/22  1252 04/19/22  1853 04/20/22  0755   * 132* 133*   K 4.1 4.2 4.5   CL 95* 94* 95*   CO2 19* 20* 19*   GLUCOSE 194* 213* 147*   PHOS  --   --  6.8*   BUN 85* 47* 58*   CREATININE 10.9* 7.7* 8.9*   LABGLOM 5* 7* 6*   GFRAA 6* 9* 8*       Assessment/     ESRD:  Due to diabetic nephropathy  - Schedule:  MWF at [address] of breath:  - Heart failure with poor fluid management on dialysis due to frequent missed treatments and high IDWG  - Improving oxygen requirements with UF  - Noted ABG last night      Hyponatremia:  Due to high IDWG     Anemia of chronic disease-CKD:   - Hb is at target     Hypertension:  Range elevated, volume and stress mediated. Improved nicely with dialysis     Plan/     Down to 105.5 kg, this is pretty good for him. Will need TDC exchange due to poor functioning catheter despite alteplase, we cannot get flows to stay up and he likely has a clot or fibrin sheath.   Eliquis on hold   Follow clinical status and monitor BP  Usually suffers from delirium in the hospital     -----------------------------  Luanne Ogden M.D.   [facility]

## 2022-04-20 NOTE — PROGRESS NOTES
04/20/22 0340   NIV Type   Skin Assessment Clean, dry, & intact   Skin Protection for O2 Device Yes   Location Nose   Equipment Type v60   Mode Bilevel   Mask Type Full face mask   Mask Size Large   Settings/Measurements   IPAP 15 cmH20   CPAP/EPAP 6 cmH2O   Resp 17   FiO2  35 %   Vt Exhaled 412 mL   Minute Volume 7.4 Liters   Mask Leak (lpm) 43 lpm   Comfort Level Good   Using Accessory Muscles No

## 2022-04-20 NOTE — PROGRESS NOTES
Hospitalist Progress Note    Patient:  Caryn Christy  Unit/Bed:0433/0433-01   YOB: 1970       MRN: [id number] Acct: [de-identified]  PCP: Brandan Jain MD    Date of Admission: 4/18/2022  --------------------------    Chief Complaint:     Shortness of breath    Hospital Course:     Caryn Christy is a 45 y.o. male hospitalized on 4/18/2022   with history of end-stage renal failure CHF, A. fib, hospitalized for recurrent shortness of breath and acute hypoxic respiratory failure secondary to nonadherence to HD. Gregg Leung Assessment/plan:        Acute hypoxic hypercapnic respiratory failure secondary to volume overload/acute CHF from end-stage renal failure  -   -Greatly appreciate nephrology input, undergoing HD today  -Continue weaning off oxygen as tolerated  -Will do home oxygen evaluation  -Limit narcotics, Seroquel dose reduced  -High risk for recurrent admissions secondary to nonadherence  -     Acute on chronic CHF, undetermined type, EF of 45% secondary to nonadherence to hemodialysis  Overall improving, continue Lasix      Nonspecific troponin elevation in the setting of end-stage renal failure    Elevated procalcitonin, no clinical evidence to support pneumonia  Antibiotic discontinued    COPD without exacerbation  -Respiratory care protocol, oxygen supplementation as needed,      Aortic stenosis/bicuspid aortic valve status post TAVR    Coronary artery disease  Continue selected home medication occluding Plavix, beta-blocker and statin    Diabetes mellitus type 2 with multiple complication  Monitor blood sugar, insulin as required      Right fourth toe ulcer, continue wound care    End-stage renal failure/   Nephrology following, continue hemodialysis    -Mild hyponatremia  , stable, continue monitoring      Mood disorder, continue Cymbalta, Seroquel dose reduced    Dyslipidemia, CAD status post prior stents  Continue statin    afib Anticoagulation with Eliquis.     Code Status: Full Code         DVT prophylaxis: Eliquis     Disposition: Expecting discharge home in 1 to 2 days, need home oxygen    I discussed my thought processes at length with patient/family and patient understood. Question and concerns  Addressed      Discussed with RN      ----------------      Subjective:     Patient seen and examined  Receiving HD treatment today which is his normal  Dry weight improving    Intermittently refusing care including labs    Diet: ADULT DIET; Regular; Low Potassium (Less than 3000 mg/day)    OBJECTIVE     Exam:  BP (!) 153/85   Pulse 68   Temp 98 °F (36.7 °C)   Resp 18   Ht 5' 6\" (1.676 m)   Wt 232 lb 9.4 oz (105.5 kg)   SpO2 99%   BMI 37.54 kg/m²          Overall unchanged physical exam finding  Gen: Not in distress. Easily arousable. Chronically ill  Head: Normocephalic. Atraumatic. Eyes: Conjunctivae/corneas clear. ENT: Limited by BiPAP neck: No JVD. No obvious thyromegaly. CVS: Nml S1S2,  murmur     Pulmomary: Diminished air entry. Gastrointestinal: Soft, non tender, non distend, . Musculoskeletal: Improved lower extremity edema  Neuro: No focal deficit. Moves extremity spontaneously. Psychiatry: Appropriate affect. Not agitated.         Medications:  Reviewed    Infusion Medications    dextrose      sodium chloride       Scheduled Medications    insulin lispro  0-3 Units SubCUTAneous Nightly    insulin lispro  0-6 Units SubCUTAneous TID WC    QUEtiapine  25 mg Oral Nightly    apixaban  5 mg Oral BID    clopidogrel  75 mg Oral Daily    docusate sodium  100 mg Oral Daily    DULoxetine  30 mg Oral Daily    gabapentin  100 mg Oral TID    metoprolol succinate  50 mg Oral BID    pantoprazole  40 mg Oral QAM AC    pravastatin  40 mg Oral Daily    sodium chloride flush  5-40 mL IntraVENous 2 times per day    vancomycin (VANCOCIN) intermittent dosing (placeholder)   Other RX Placeholder     PRN Meds: ipratropium-albuterol, oxyCODONE-acetaminophen, glucagon (rDNA), dextrose, dextrose bolus (hypoglycemia) **OR** dextrose bolus (hypoglycemia), sodium chloride flush, sodium chloride, ondansetron **OR** ondansetron, polyethylene glycol, acetaminophen **OR** acetaminophen      Intake/Output Summary (Last 24 hours) at 4/20/2022 1308  Last data filed at 4/20/2022 1116  Gross per 24 hour   Intake 1040 ml   Output 1500 ml   Net -460 ml             Labs:   Recent Labs     04/18/22  1252 04/20/22  0755   WBC 12.3* 6.9   HGB 11.2* 10.1*   HCT 34.5* 31.5*    191     Recent Labs     04/18/22  1252 04/19/22  1853 04/20/22  0755   * 132* 133*   K 4.1 4.2 4.5   CL 95* 94* 95*   CO2 19* 20* 19*   BUN 85* 47* 58*   CREATININE 10.9* 7.7* 8.9*   CALCIUM 9.2 9.0 9.4   PHOS  --   --  6.8*     Recent Labs     04/18/22  1252   AST 12*   ALT 9*   BILITOT <0.2   ALKPHOS 109     No results for input(s): INR in the last 72 hours. Recent Labs     04/18/22  1252 04/18/22 2033   TROPONINI 0.17* 0.19*       Urinalysis:      Lab Results   Component Value Date    NITRU Negative 03/18/2022    WBCUA  03/18/2022    BACTERIA 1+ 03/18/2022    RBCUA 3-4 03/18/2022    BLOODU TRACE-INTACT 03/18/2022    SPECGRAV 1.020 03/18/2022    GLUCOSEU 100 03/18/2022       Radiology:  XR CHEST PORTABLE   Final Result   Pulmonary vascular congestion/interstitial edema. Probable small bilateral   pleural effusions.                      Electronically signed by Ila Parish MD on 4/20/2022 at 1:08 PM

## 2022-04-20 NOTE — PROGRESS NOTES
Treatment time: 210    Net UF: 1000    Pre weight: 106  Post weight: 105.5  EDW: tbd    Access used: LCVC  Access function: when pt not laying on L side    Medications or blood products given: none    Regular outpatient schedule: [facility] [employer] [employer] of response to treatment: *tolerated treatment well no issues remove 1KG**    Copy of dialysis treatment record placed in chart, to be scanned into EMR.

## 2022-04-20 NOTE — PROGRESS NOTES
INPATIENT PULMONARY CRITICAL CARE PROGRESS NOTE      Reason for visit    respiratory failure-BIPAP    SUBJECTIVE: Patient underwent emergent dialysis yesterday and 3 kg of fluid was removed, patient was given BiPAP on a continuous basis yesterday including night time, patient was taken off the BiPAP this morning, patient when seen was again getting a cycle of dialysis and only 1 kg of removal of fluid being contemplated as per HD nurse, patient has some shortness of breath and increased work of breathing but it is less as compared to yesterday, patient was much more alert and communicative, patient was not gasping for breath, patient was not having to stop in the middle of the sentence because of shortness of breath, patient was afebrile and hemodynamically maintained, patient's blood sugars were not optimally controlled, patient was on 3 L nasal, oxygen saturations 93% when evaluated, patient has a cumulative fluid balance of -6.3 L, patient was alert and communicative, no other pertinent review of system of concern             Physical Exam:  Blood pressure (!) 153/85, pulse 68, temperature 98 °F (36.7 °C), temperature source Oral, resp. rate 18, height 5' 6\" (1.676 m), weight 232 lb 9.4 oz (105.5 kg), SpO2 93 %.'     Constitutional: In less t shortness of breath with increased work of breathing on nasal cannula when seen  HENT: no facial asymmetry ;no thyromegaly. Eyes:  Conjunctivae are normal. Pupils equal, round, and reactive to light. No scleral icterus. Neck: . No tracheal deviation present. No obvious thyroid mass. Short large neck  Cardiovascular: Normal rate, regular rhythm, normal heart sounds. No right ventricular heave. Minimal lower extremity edema. Pulmonary/Chest: No wheezes. Decreased Bilateral rales. Chest wall is not dull to percussion. Some accessory muscle usage or stridor. Decreased breath sound intensity  Abdominal: Soft. Bowel sounds present. No distension or hernia.  No tenderness. Obese  Musculoskeletal: No cyanosis. No clubbing. No obvious joint deformity. Lymphadenopathy: No cervical or supraclavicular adenopathy. Skin: Skin is warm and dry. No rash or nodules on the exposed extremities. Psychiatric:  Less anxious secondary to increased work of breathing and shortness of breath  Neurologic:  Alert and communicative, no focal motor deficits    Results:  CBC:   Recent Labs     04/18/22  1252 04/20/22  0755   WBC 12.3* 6.9   HGB 11.2* 10.1*   HCT 34.5* 31.5*   MCV 92.1 92.0    191     BMP:   Recent Labs     04/18/22  1252 04/19/22  1853 04/20/22  0755   * 132* 133*   K 4.1 4.2 4.5   CL 95* 94* 95*   CO2 19* 20* 19*   PHOS  --   --  6.8*   BUN 85* 47* 58*   CREATININE 10.9* 7.7* 8.9*     LIVER PROFILE:   Recent Labs     04/18/22  1252   AST 12*   ALT 9*   BILITOT <0.2   ALKPHOS 109       Imaging:  I have reviewed radiology images personally. XR CHEST PORTABLE   Final Result   Pulmonary vascular congestion/interstitial edema. Probable small bilateral   pleural effusions. XR CHEST PORTABLE    Result Date: 4/18/2022  EXAMINATION: ONE XRAY VIEW OF THE CHEST 4/18/2022 12:25 pm COMPARISON: Chest x-ray dated 03/17/2022. HISTORY: ORDERING SYSTEM PROVIDED HISTORY: sob TECHNOLOGIST PROVIDED HISTORY: Reason for exam:->sob Reason for Exam: sob FINDINGS: LINES/TUBES/OTHER: Left IJ tunneled hemodialysis catheter is noted and in satisfactory position. HEART/MEDIASTINUM: The cardiomediastinal silhouette is stable. PLEURA/LUNGS: There are increased interstitial markings reflecting pulmonary vascular congestion/interstitial edema. There are probable small bilateral pleural effusions. .  There is no appreciable pneumothorax. BONES/SOFT TISSUE: No acute abnormality. Pulmonary vascular congestion/interstitial edema. Probable small bilateral pleural effusions.      IR TUNNELED CVC PLACE WO SQ PORT/PUMP > 5 YEARS    Result Date: 3/21/2022  PROCEDURE: ULTRASOUND GUIDED VASCULAR ACCESS. FLUOROSCOPY GUIDED PLACEMENT OF A TUNNELED CATHETER. MODERATE CONSCIOUS SEDATION 3/21/2022. HISTORY: ORDERING SYSTEM PROVIDED HISTORY: [employer] TDC not working, needs replaced TECHNOLOGIST PROVIDED HISTORY: Reason for exam:->[employer] TDC not working, needs replaced How many lumens are being requested?->2 What side should this line be placed? ->Either What site is the preferred site? ->Internal Jugular Reason for Exam: replacement SEDATION: Intravenous sedation was administered with continuous monitoring throughout the procedure. In measured doses, a total of intravenous 2 g Ancef, 1 mg intravenous Versed and 50 mcg intravenous fentanyl was administered. My total procedure time with moderate sedation was 25 minutes. ESTIMATED BLOOD LOSS: None. FLUOROSCOPY DOSE AND TYPE OR TIME AND EXPOSURES: 27 seconds, 1 digital image. TECHNIQUE: Informed consent was obtained after a detailed explanation of the procedure including risks, benefits, and alternatives. Universal protocol was observed. The left neck and chest as well as pre-existing catheter were prepped and draped in sterile fashion using maximum sterile barrier technique. Local anesthesia was achieved with lidocaine. Deep soft tissues between the lower neck and upper chest catheter exit point. The previous dialysis catheter was flushed, sutures removed and exchange wire placed. Moderate traction and gentle teasing of fibrous adhesions allowed for release of the catheter. The catheter was gently extracted and the lower IJ vena puncture site was compressed for hemostasis. The 23 cm dialysis catheter was then placed over the wire and advanced into position under fluoroscopy. The tip terminates in the mid right atrium. Both ports aspirated with easy blood return and were flushed easily with heparinized saline. The ports were locked with heparinized saline. The patient tolerated the procedure well and there were no immediate complications.   The external portion of the catheter was sewn to the skin surface and sterilely bandaged. FINDINGS: Fluoroscopy utilized for tunneled left internal jugular vein dialysis catheter terminating in the mid right atrium. Successful fluoroscopy guided right IJ placement of the tunneled dialysis catheter. The catheter is available for immediate utilization. Assessment:  Active Problems:    S/P TAVR (transcatheter aortic valve replacement)    Former smoker    Cardiomyopathy ([facility] [address]    ESRD on dialysis ([facility] [address]    Acute diastolic CHF (congestive heart failure) (HCC)    Acute hypoxemic respiratory failure (HCC)    Acute pulmonary edema (HCC)    Obesity, Class II, BMI 35-39.9    Grade II diastolic dysfunction  Resolved Problems:    * No resolved hospital problems.  *          Plan:   · Oxygen supplementation  to keep saturation being 90-94% only  · Please titrate down the oxygen as per the above parameters  · BIPAP on intermittent basis   · BIPAP on intermittent basis -needs to continue and be complaint with it at home   · Pulmonary toilet  · Patient's x-ray chest shows patient to have mild pulmonary venous congestion and patient CT of the chest did not show any pneumonic process but has a lung nodule which is new along with reactive adenopathy  · Patient does not need any antibiotics from pulmonary standpoint of view  · HD as per nephrology-getting one again when seen and 1 kg removal being contemplated   · No need for any  invasive ventilation   · Patient's PFT flow-volume loop in 2016 did not show any significant intrathoracic or extrathoracic or fixed obstruction at that time  · Patient will benefit from a repeat PFT as an outpatient  · Antihypertensives as per IM/nephrology   · Patient had perineal wound infection in past -IM to decide about continuation of vancomycin   · Bronchodilators  · We will hold off on any systemic steroids at this time  · Lantus insulin as per blood glucose monitoring as per IM  · Blood glucose monitoring with sliding scale insulin  · Trend hemodynamics and cardiac rhythm closely  · Diet and life style modifications  · Patient needs to lose weight   · On Eliquis   · PUD prophylaxis  · Compliance is important     Case d/w patient and HD nurse          Electronically signed by:  Gregory Moulton MD    4/20/2022    2:06 PM.

## 2022-04-21 ENCOUNTER — APPOINTMENT (OUTPATIENT)
Dept: INTERVENTIONAL RADIOLOGY/VASCULAR | Age: 52
DRG: 291 | End: 2022-04-21
Payer: COMMERCIAL

## 2022-04-21 LAB
GLUCOSE BLD-MCNC: 134 MG/DL (ref 70–99)
GLUCOSE BLD-MCNC: 146 MG/DL (ref 70–99)
GLUCOSE BLD-MCNC: 213 MG/DL (ref 70–99)
GLUCOSE BLD-MCNC: 236 MG/DL (ref 70–99)
PERFORMED ON: ABNORMAL

## 2022-04-21 PROCEDURE — 02PA33Z REMOVAL OF INFUSION DEVICE FROM HEART, PERCUTANEOUS APPROACH: ICD-10-PCS | Performed by: INTERNAL MEDICINE

## 2022-04-21 PROCEDURE — 94660 CPAP INITIATION&MGMT: CPT

## 2022-04-21 PROCEDURE — 36581 REPLACE TUNNELED CV CATH: CPT

## 2022-04-21 PROCEDURE — 2060000000 HC ICU INTERMEDIATE R&B

## 2022-04-21 PROCEDURE — 0JPT3XZ REMOVAL OF TUNNELED VASCULAR ACCESS DEVICE FROM TRUNK SUBCUTANEOUS TISSUE AND FASCIA, PERCUTANEOUS APPROACH: ICD-10-PCS | Performed by: INTERNAL MEDICINE

## 2022-04-21 PROCEDURE — C1750 CATH, HEMODIALYSIS,LONG-TERM: HCPCS

## 2022-04-21 PROCEDURE — 6370000000 HC RX 637 (ALT 250 FOR IP): Performed by: INTERNAL MEDICINE

## 2022-04-21 PROCEDURE — 02H633Z INSERTION OF INFUSION DEVICE INTO RIGHT ATRIUM, PERCUTANEOUS APPROACH: ICD-10-PCS | Performed by: INTERNAL MEDICINE

## 2022-04-21 PROCEDURE — 2580000003 HC RX 258: Performed by: INTERNAL MEDICINE

## 2022-04-21 PROCEDURE — 99232 SBSQ HOSP IP/OBS MODERATE 35: CPT | Performed by: INTERNAL MEDICINE

## 2022-04-21 PROCEDURE — 2580000003 HC RX 258: Performed by: RADIOLOGY

## 2022-04-21 PROCEDURE — 0JH63XZ INSERTION OF TUNNELED VASCULAR ACCESS DEVICE INTO CHEST SUBCUTANEOUS TISSUE AND FASCIA, PERCUTANEOUS APPROACH: ICD-10-PCS | Performed by: INTERNAL MEDICINE

## 2022-04-21 PROCEDURE — 6360000002 HC RX W HCPCS: Performed by: RADIOLOGY

## 2022-04-21 PROCEDURE — 6370000000 HC RX 637 (ALT 250 FOR IP): Performed by: HOSPITALIST

## 2022-04-21 PROCEDURE — 99152 MOD SED SAME PHYS/QHP 5/>YRS: CPT

## 2022-04-21 PROCEDURE — 2700000000 HC OXYGEN THERAPY PER DAY

## 2022-04-21 PROCEDURE — 94761 N-INVAS EAR/PLS OXIMETRY MLT: CPT

## 2022-04-21 PROCEDURE — 77001 FLUOROGUIDE FOR VEIN DEVICE: CPT

## 2022-04-21 RX ORDER — FENTANYL CITRATE 50 UG/ML
INJECTION, SOLUTION INTRAMUSCULAR; INTRAVENOUS
Status: COMPLETED | OUTPATIENT
Start: 2022-04-21 | End: 2022-04-21

## 2022-04-21 RX ORDER — MIDAZOLAM HYDROCHLORIDE 1 MG/ML
INJECTION INTRAMUSCULAR; INTRAVENOUS
Status: COMPLETED | OUTPATIENT
Start: 2022-04-21 | End: 2022-04-21

## 2022-04-21 RX ADMIN — DULOXETINE HYDROCHLORIDE 30 MG: 30 CAPSULE, DELAYED RELEASE ORAL at 08:56

## 2022-04-21 RX ADMIN — FENTANYL CITRATE 50 MCG: 50 INJECTION INTRAMUSCULAR; INTRAVENOUS at 12:37

## 2022-04-21 RX ADMIN — QUETIAPINE FUMARATE 25 MG: 25 TABLET ORAL at 21:57

## 2022-04-21 RX ADMIN — OXYCODONE AND ACETAMINOPHEN 1 TABLET: 7.5; 325 TABLET ORAL at 08:55

## 2022-04-21 RX ADMIN — CEFAZOLIN 2000 MG: 1 INJECTION, POWDER, FOR SOLUTION INTRAMUSCULAR; INTRAVENOUS at 12:37

## 2022-04-21 RX ADMIN — METOPROLOL SUCCINATE 50 MG: 50 TABLET, EXTENDED RELEASE ORAL at 21:51

## 2022-04-21 RX ADMIN — OXYCODONE AND ACETAMINOPHEN 1 TABLET: 7.5; 325 TABLET ORAL at 21:54

## 2022-04-21 RX ADMIN — GABAPENTIN 100 MG: 100 CAPSULE ORAL at 14:39

## 2022-04-21 RX ADMIN — MIDAZOLAM HYDROCHLORIDE 1 MG: 2 INJECTION, SOLUTION INTRAMUSCULAR; INTRAVENOUS at 12:36

## 2022-04-21 RX ADMIN — METOPROLOL SUCCINATE 50 MG: 50 TABLET, EXTENDED RELEASE ORAL at 08:56

## 2022-04-21 RX ADMIN — SODIUM CHLORIDE, PRESERVATIVE FREE 10 ML: 5 INJECTION INTRAVENOUS at 08:59

## 2022-04-21 RX ADMIN — OXYCODONE AND ACETAMINOPHEN 1 TABLET: 7.5; 325 TABLET ORAL at 16:09

## 2022-04-21 RX ADMIN — INSULIN LISPRO 2 UNITS: 100 INJECTION, SOLUTION INTRAVENOUS; SUBCUTANEOUS at 17:20

## 2022-04-21 RX ADMIN — PANTOPRAZOLE SODIUM 40 MG: 40 TABLET, DELAYED RELEASE ORAL at 08:56

## 2022-04-21 RX ADMIN — GABAPENTIN 100 MG: 100 CAPSULE ORAL at 08:56

## 2022-04-21 RX ADMIN — PRAVASTATIN SODIUM 40 MG: 40 TABLET ORAL at 08:56

## 2022-04-21 RX ADMIN — GABAPENTIN 100 MG: 100 CAPSULE ORAL at 21:51

## 2022-04-21 ASSESSMENT — PAIN DESCRIPTION - PAIN TYPE
TYPE: CHRONIC PAIN

## 2022-04-21 ASSESSMENT — PAIN DESCRIPTION - DESCRIPTORS
DESCRIPTORS: ACHING

## 2022-04-21 ASSESSMENT — PAIN DESCRIPTION - LOCATION
LOCATION: BACK

## 2022-04-21 ASSESSMENT — PAIN DESCRIPTION - ORIENTATION
ORIENTATION: LOWER

## 2022-04-21 ASSESSMENT — PAIN SCALES - GENERAL
PAINLEVEL_OUTOF10: 10
PAINLEVEL_OUTOF10: 9
PAINLEVEL_OUTOF10: 9
PAINLEVEL_OUTOF10: 6

## 2022-04-21 ASSESSMENT — PAIN DESCRIPTION - FREQUENCY: FREQUENCY: CONTINUOUS

## 2022-04-21 NOTE — PROGRESS NOTES
Hospitalist Progress Note    Patient:  Niko Noe  Unit/Bed:0433/0433-01   YOB: 1970       MRN: [id number] Acct: [de-identified]  PCP: Cat Montgomery MD    Date of Admission: 4/18/2022  --------------------------    Chief Complaint:     Shortness of breath    Hospital Course:     Niko Noe is a 45 y.o. male hospitalized on 4/18/2022   with history of end-stage renal failure CHF, A. fib, hospitalized for recurrent shortness of breath and acute hypoxic respiratory failure secondary to nonadherence to HD. Janeen Combs Assessment/plan:        Acute hypoxic hypercapnic respiratory failure secondary to volume overload/acute CHF from end-stage renal failure  -   - overall respiratory status improved  -Likely need home oxygen, home oxygen evaluation in the a.m. Continue to encourage adherence to hemodialysis  -     Acute on chronic CHF, undetermined type, EF of 45% secondary to nonadherence to hemodialysis  Plan for hemodialysis tomorrow  Continue Lasix      Nonspecific troponin elevation in the setting of end-stage renal failure    Elevated procalcitonin, no clinical evidence to support pneumonia  Antibiotic discontinued    COPD without exacerbation  -Respiratory care protocol, oxygen supplementation as needed,      Aortic stenosis/bicuspid aortic valve status post TAVR    Coronary artery disease  Continue selected home medication occluding Plavix, beta-blocker and statin    Diabetes mellitus type 2 with multiple complication  Monitor blood sugar, insulin as required      Right fourth toe ulcer, continue wound care    End-stage renal failure/   Nephrology following, continue hemodialysis    -Mild hyponatremia  , stable, continue monitoring      Mood disorder, continue Cymbalta, Seroquel dose reduced    Dyslipidemia, CAD status post prior stents  Continue statin    afib Anticoagulation with Eliquis.     Code Status: Full Code         DVT prophylaxis: Eliquis     Disposition: Expecting discharge home in 1 to 2 days, need home oxygen    I discussed my thought processes at length with patient/family and patient understood. Question and concerns  Addressed      Discussed with RN      ----------------      Subjective:     Patient seen and examined    No complaint today  Shortness of breath improved  No major overnight events    Diet: ADULT ORAL NUTRITION SUPPLEMENT; AM Snack, PM Snack; Renal Oral Supplement  ADULT DIET; Regular; Low Potassium (Less than 3000 mg/day)    OBJECTIVE     Exam:  /68   Pulse 63   Temp 97.8 °F (36.6 °C) (Oral)   Resp 16   Ht 5' 6\" (1.676 m)   Wt 234 lb 2.1 oz (106.2 kg)   SpO2 100%   BMI 37.79 kg/m²          Gen: Not in distress. Alert. Head: Normocephalic. Atraumatic. Eyes: Conjunctivae/corneas clear. ENT: Oral mucosa moist  Neck: No JVD. No obvious thyromegaly. CVS: Nml S1S2, no murmur  , RRR  Pulmomary: Clear bilaterally. No crackles. No wheezes. Gastrointestinal: Soft, non tender, non distend, . Musculoskeletal: HD catheter on the left upper chest no edema. Warm  Neuro: No focal deficit. Moves extremity spontaneously. Psychiatry: Appropriate affect. Not agitated.     Medications:  Reviewed    Infusion Medications    dextrose      sodium chloride       Scheduled Medications    ceFAZolin (ANCEF) 2000 mg in dextrose 5 % 100 mL IVPB        insulin lispro  0-3 Units SubCUTAneous Nightly    insulin lispro  0-6 Units SubCUTAneous TID WC    QUEtiapine  25 mg Oral Nightly    clopidogrel  75 mg Oral Daily    docusate sodium  100 mg Oral Daily    DULoxetine  30 mg Oral Daily    gabapentin  100 mg Oral TID    metoprolol succinate  50 mg Oral BID    pantoprazole  40 mg Oral QAM AC    pravastatin  40 mg Oral Daily    sodium chloride flush  5-40 mL IntraVENous 2 times per day    vancomycin (VANCOCIN) intermittent dosing (placeholder)   Other RX Placeholder     PRN Meds: ipratropium-albuterol, oxyCODONE-acetaminophen, glucagon (rDNA), dextrose, dextrose bolus (hypoglycemia) **OR** dextrose bolus (hypoglycemia), sodium chloride flush, sodium chloride, ondansetron **OR** ondansetron, polyethylene glycol, acetaminophen **OR** acetaminophen      Intake/Output Summary (Last 24 hours) at 4/21/2022 1348  Last data filed at 4/21/2022 4317  Gross per 24 hour   Intake 0 ml   Output 0 ml   Net 0 ml             Labs:   Recent Labs     04/20/22  0755   WBC 6.9   HGB 10.1*   HCT 31.5*        Recent Labs     04/19/22  1853 04/20/22  0755   * 133*   K 4.2 4.5   CL 94* 95*   CO2 20* 19*   BUN 47* 58*   CREATININE 7.7* 8.9*   CALCIUM 9.0 9.4   PHOS  --  6.8*     No results for input(s): AST, ALT, BILIDIR, BILITOT, ALKPHOS in the last 72 hours. No results for input(s): INR in the last 72 hours. Recent Labs     04/18/22 2033   TROPONINI 0.19*       Urinalysis:      Lab Results   Component Value Date    NITRU Negative 03/18/2022    WBCUA  03/18/2022    BACTERIA 1+ 03/18/2022    RBCUA 3-4 03/18/2022    BLOODU TRACE-INTACT 03/18/2022    SPECGRAV 1.020 03/18/2022    GLUCOSEU 100 03/18/2022       Radiology:  XR CHEST PORTABLE   Final Result   Pulmonary vascular congestion/interstitial edema. Probable small bilateral   pleural effusions.          IR TUNNELED CVC PLACE WO SQ PORT/PUMP > 5 YEARS    (Results Pending)               Electronically signed by Melquiades Vargas MD on 4/21/2022 at 1:48 PM

## 2022-04-21 NOTE — OR NURSING
Patient is tolerating the procedure well. No distress noted. Patient remains on all cardiac monitoring and is on 3L NC at this time. Will continue to monitor.

## 2022-04-21 NOTE — CONSULTS
Kav[address] 53 1970    History:  Past Medical History:   Diagnosis Date    Ambulatory dysfunction     walker for long distances, SOB with distance    Aortic stenosis     echo 2017    Arthritis     hands and hips    Asthma     Bilateral hilar adenopathy syndrome 6/3/2013    CAD (coronary artery disease)     Dr. Donaldo Robertson [facility]) 04/19/2019    EF= 43%    CHF (congestive heart failure) (HCC)     Chronic pain     COPD (chronic obstructive pulmonary disease) ([facility] [address] [address]     pulmonology Dr. Peggy Porras    Depression     Diabetes mellitus ([address]     borderline    Difficult intravenous access     Emphysema of lung ([address]     ESRD (end stage renal disease) on dialysis ([address]     MWF    Fear of needles     Gastric ulcer     GERD (gastroesophageal reflux disease)     Heart valve problem     bicuspic valve    Hemodialysis patient ([address]     History of spinal fracture     work incident    Hx of blood clots     Bilateral lower extremities; stents in place    Hyperlipidemia     Hypertension     MI (myocardial infarction) ([facility] [address] 2019    has had 9 MIs. 2019 was the last    Neuromuscular disorder ([facility] [address]     due to CVA    Numbness and tingling in left arm     from fistula    Pneumonia     PONV (postoperative nausea and vomiting)     Prolonged emergence from general anesthesia     States requires more medication than most people    Sleep apnea     Uses CPAP    Stroke ([facility] [address]     7mm thalamic cva 2017 deficts left side, left side weakness    TIA (transient ischemic attack)     Unspecified diseases of blood and blood-forming organs        ECHO:  1/12/22  EF 40-45%  HgA1C:  3/17/22  8.0  Iron Saturation:   3/17/22  19    ACE/ARB: none due to renal function    BB: Toprol XL 50mg BID    Last Hospital Admission:   3/17/22  ESRD    Discharge plans: to return home with wife and current HD, Srinivasan Morel if pt can find contact information. Family Present: none  Advanced Directives: patient has advance directives scanned in the chart  Patient's stated goal of care: be more comfortable prior to discharge; long term goals include remain out of hospital  Lifestyle goal discussed: compliance, daily weights, diet/fluid restrictions, HD, financials    Patient resting in bed at this time on 3 L O2. Pt denies shortness of breath; no complaints of chest pain. Pt states he lives at home with wife. Mentioned his diet largely consists of no added salt. Pt states he drinks more than 64 ounces of fluid per day, including tea. States he takes all his home medications as prescribed. Patient has a scale at home. Patient expressed concerns paying for medications. Spoke to [employer] for CHF education. Pt states that he was doing well and being compliant with meds and HD until this week. States he missed HD due to a GI bug and then accidentally drank an entire \"jug\" of tea. States that he has recently become short on money. Can not pay for his cell phone bill therefore no longer has a phone. States he is out of all of his medications except his DM pens and cannot afford to get them. States he is about to have his electric turned off and his wife is at home with no food. This writer reached out to  to assist with areas able. She is going to work with [employer] to [employer] and provide pt resources for his electric bill and such. Writer provided pt with CHF education booklet and CHF resource line information for ongoing support.      Patient recent weights and intake/output reviewed:    Patient Vitals for the past 96 hrs (Last 3 readings):   Weight   04/21/22 0515 234 lb 2.1 oz (106.2 kg)   04/20/22 1116 232 lb 9.4 oz (105.5 kg)   04/20/22 0749 233 lb 11 oz (106 kg)         Intake/Output Summary (Last 24 hours) at 4/21/2022 0716  Last data filed at 4/21/2022 7610  Gross per 24 hour   Intake 500 ml   Output 1500 ml   Net -1000 ml        Notified patient to call the doctor post discharge if he experiences shortness of breath, chest pain, swelling, cough, or weight gain of 2-3 pounds in a day / 5 pounds in a week. Also notified patient to call the doctor if he feels dizzy, increased fatigue, decreased or difficulty urinating. Reviewed the red, yellow, green zones of heart failure self management. Encouraged patient to call the MD with early signs of an acute heart failure exacerbation or when in the yellow zone. Patient provided with both written and verbal education on CHF signs/symptoms, causes, discharge medications, daily weights, low sodium diet, activity, fluid restriction, and follow-up. Pt verbalized understanding. No additional questions at this time. Stated he will alert his nurse with any questions. PATIENT/CAREGIVER TEACHING:    Level of patient/caregiver understanding able to:   Eren.Wishram ] Verbalize understanding [ ] Demonstrate understanding [ ] Teach back   [ ] Needs reinforcement [ ] Other:     Education Time: 15 min    Recommendations:   1. Encourage follow-up appointment compliance. 2. Educate further on fluid restriction 48 oz - 64 oz during inpatient stay so he can understand how to measure intake at home. 3. Review sodium restrictions. Encourage patient to not add table salt to his foods and avoid foods that are high in sodium. 4. Continue to educate on S/S. Stress the importance of calling the MD with the earliest signs of an acute exacerbation. 5. Emphasize daily weights - instruct patient to call the MD if he gains 2-3 lb in a day or 5 lb in a week. 6. Provided patient with CHF Resource Line for questions and concerns.      Pablo Palomino RN

## 2022-04-21 NOTE — PROGRESS NOTES
04/21/22 0756   NIV Type   Skin Assessment Clean, dry, & intact   Equipment Type V60   Mode Bilevel   Mask Type Full face mask   Mask Size Large   Settings/Measurements   PIP Observed 16 cm H20   IPAP 15 cmH20   CPAP/EPAP 6 cmH2O   Rate Ordered 10   Resp 17   FiO2  35 %   I Time/ I Time % 1 s   Vt Exhaled 609 mL   Minute Volume 6 Liters   Mask Leak (lpm) 11 lpm   Comfort Level Good   Using Accessory Muscles No   SpO2 100   Patient's Home Machine No   Alarm Settings   Alarms On Y   Low Pressure 6 cmH2O   High Pressure  30 cmH2O   Delay Alarm 20 sec(s)   RR Low  6   RR High 40 br/min   Oxygen Therapy/Pulse Ox   O2 Therapy Oxygen   $Oxygen $Daily Charge   O2 Device PAP (positive airway pressure)   SpO2 100 %   $Pulse Oximeter $Spot check (multiple/continuous)

## 2022-04-21 NOTE — PROGRESS NOTES
INPATIENT PULMONARY CRITICAL CARE PROGRESS NOTE      Reason for visit    respiratory failure-BIPAP    SUBJECTIVE: Patient when seen this morning was comfortably sleeping in the bed without any profound respite distress, patient got BiPAP last night and was on 35% cream at that time, patient does not have any increased work of breathing, patient's HD catheter needs to be exchanged as per nursing because of flow is not adequate and patient may be requiring another cycle of dialysis today as per nursing but are waiting for directions from nephrologist,, patient is afebrile and he medically maintained, patient's glycemic control is somewhat suboptimal but better than before, no other pertinent review of system of concern           Physical Exam:  Blood pressure 116/62, pulse 65, temperature 97.9 °F (36.6 °C), temperature source Oral, resp. rate 16, height 5' 6\" (1.676 m), weight 234 lb 2.1 oz (106.2 kg), SpO2 100 %.'     Constitutional: In no significant respiratory distress  HENT: no facial asymmetry ;no thyromegaly. Eyes:  Conjunctivae are normal. Pupils equal, round, and reactive to light. No scleral icterus. Neck: . No tracheal deviation present. No obvious thyroid mass. Short large neck  Cardiovascular: Normal rate, regular rhythm, normal heart sounds. No right ventricular heave. Minimal lower extremity edema. Pulmonary/Chest: No wheezes. Minimal Bilateral rales. Chest wall is not dull to percussion. Some accessory muscle usage or stridor. Decreased breath sound intensity  Abdominal: Soft. Bowel sounds present. No distension or hernia. No tenderness. Obese  Musculoskeletal: No cyanosis. No clubbing. No obvious joint deformity. Lymphadenopathy: No cervical or supraclavicular adenopathy. Skin: Skin is warm and dry. No rash or nodules on the exposed extremities.   Neurologic:  Sleeping when seen    Results:  CBC:   Recent Labs     04/18/22  1252 04/20/22  0755   WBC 12.3* 6.9   HGB 11.2* 10.1*   HCT 34.5* 31.5*   MCV 92.1 92.0    191     BMP:   Recent Labs     04/18/22  1252 04/19/22  1853 04/20/22  0755   * 132* 133*   K 4.1 4.2 4.5   CL 95* 94* 95*   CO2 19* 20* 19*   PHOS  --   --  6.8*   BUN 85* 47* 58*   CREATININE 10.9* 7.7* 8.9*     LIVER PROFILE:   Recent Labs     04/18/22  1252   AST 12*   ALT 9*   BILITOT <0.2   ALKPHOS 109       Imaging:  I have reviewed radiology images personally. XR CHEST PORTABLE   Final Result   Pulmonary vascular congestion/interstitial edema. Probable small bilateral   pleural effusions. IR TUNNELED CVC PLACE WO SQ PORT/PUMP > 5 YEARS    (Results Pending)       Assessment:  Active Problems:    S/P TAVR (transcatheter aortic valve replacement)    Former smoker    Cardiomyopathy ([address]    ESRD on dialysis [address]    Acute diastolic CHF (congestive heart failure) (HCC)    Acute hypoxemic respiratory failure (HCC)    Acute pulmonary edema (HCC)    Obesity, Class II, BMI 35-39.9    Grade II diastolic dysfunction  Resolved Problems:    * No resolved hospital problems.  *          Plan:   · Oxygen supplementation  to keep saturation being 90-94% only  · Please titrate down the oxygen as per the above parameters  · Patient to be evaluated for home oxygen  · BIPAP on intermittent basis   · BIPAP on intermittent basis -needs to continue and be complaint with it at home -patient does have the machine at home  · Pulmonary toilet  · Patient's x-ray chest shows patient to have mild pulmonary venous congestion and patient CT of the chest did not show any pneumonic process but has a lung nodule which is new along with reactive adenopathy  · Patient does not need any antibiotics from pulmonary standpoint of view  · HD as per nephrology  · Patient is getting a new HD catheter today  · No need for any  invasive ventilation   · Patient will benefit from a repeat PFT as an outpatient  · Antihypertensives as per IM/nephrology   · Patient had perineal wound infection in past -IM to decide about continuation of vancomycin   · Bronchodilators  · No need for any systemic steroids  · Lantus insulin as per blood glucose monitoring as per IM  · Blood glucose monitoring with sliding scale insulin  · Trend hemodynamics and cardiac rhythm closely  · Diet and life style modifications  · Patient needs to lose weight   · On Eliquis   · PUD prophylaxis  · Compliance is important     Case d/w case management    ? Discharge planning    No other recommendations from pulmonary/critical care standpoint of view- will sign off-please call on whenever necessary basis         Electronically signed by:  Sandro Liu MD    4/21/2022    10:48 AM.

## 2022-04-21 NOTE — CARE COORDINATION
Patient not ready today per Dr. Veronica Paul. Possible d/c tomorrow with home care and new home O2. He has a home BiPAP with [employer] and has attempted to get home O2 with them in the past and they told him he did not qualify. Patient was open to using a different company for home O2 and [employer] is following. Will need walk test. Per MD likely home after they change his [facility] today and he gets HD tomorrow. Referral pending to [facility] who accepted the referral last admission but did not open services due to unable to reach by phone. Patient states they do not have phone and are a limited income. They indicate there is no other contact they can list. [employer] at [facility] is aware and is going to call patient in room and try to coordinate coming for home care on non HD days Tuesday, Thursday or Saturday. Wife will transport home.

## 2022-04-21 NOTE — OR NURSING
Image guided tunneled dialysis catheter completed. Dr. Maria Luisa Meneses placed a 14.5 Thai 23 cm [employer] tunneled dialysis catheter LOT [id number] EXP 11/30/2023 in the left. Line aspirates and flushes with ease. Dialysis catheter secured with sutures. Surgical site dressing clean, dry, and intact. Each dialysis access lumen heparin locked with 1.8 ml of IV heparin each. Pt tolerated procedure without any signs or symptoms of distress. Vital signs stable. Report called to  RN. Pt transported back to [facility] in stable condition via bed by transport. Line ok to use per Maria Luisa Meneses.     Vital Signs  Vitals:    04/21/22 1248   BP: 102/66   Pulse: 63   Resp: 15   Temp:    SpO2: 98%

## 2022-04-21 NOTE — PROGRESS NOTES
Pt refused RN to place bed alarm on bed, pt was educated on the importance of a bed alarm and the prevention of falls, pt continued to refuse bed alarm. Pt educated to call nurse or aide before getting up, pt understood. Pt has non-skin socks on, O2 extension tubing to reach bathroom, fall risk band in place, and walker at bedside. Pt expresses he constantly falls at home however the hospital bed alarm is 'annoying' to him and he does not want it.

## 2022-04-21 NOTE — PROGRESS NOTES
04/20/22 2342   NIV Type   Skin Assessment Clean, dry, & intact   Equipment Type v60   Mode Bilevel   Mask Type Full face mask   Mask Size Large   Settings/Measurements   IPAP 15 cmH20   CPAP/EPAP 6 cmH2O   Resp 18   FiO2  35 %   Vt Exhaled 908 mL   Minute Volume 11.5 Liters   Mask Leak (lpm) 32 lpm   Comfort Level Good   Using Accessory Muscles No   SpO2 98

## 2022-04-21 NOTE — PROGRESS NOTES
RADIOLOGY:  Patient status post fluoroscopically guided replacement of left internal jugular tunneled dialysis catheter. Patient tolerated the procedure well. Full report to follow.

## 2022-04-21 NOTE — OR NURSING
Time out completed prior to procedure. Pt was place supine on the xray table. Pt was placed on all cardiac monitoring. Pt arrived on 3 L NC for sedation.

## 2022-04-21 NOTE — OR NURSING
Pt arrived for image guided tunneled dialysis catheter exchange/evaluation right IJ . Procedure explained including the risk and benefits of the procedure. All questions answered. Pt verbalizes understanding of the procedure and states no more questions. Consent confirmed. Vital signs stable. Labs, allergies, medications, and code status reviewed. No contraindications noted.      Vital Signs  Vitals:    04/21/22 1224   BP: (!) 148/95   Pulse: 64   Resp: 9   Temp:    SpO2: 100%    (vital signs in table format)    Pre Ernesto Score  2 - Able to move 4 extremities voluntarily on command  2 - BP+/- 20mmHg of normal  2 - Fully awake  1 - Needs oxygen to maintain oxygen saturation >90%  1 - Dyspnic, shallow, or limited breathing    Allergies  Morphine (allergies)    Labs  Lab Results   Component Value Date    INR 1.03 03/21/2022    PROTIME 11.6 03/21/2022     Lab Results   Component Value Date    CREATININE 8.9 (HH) 04/20/2022    BUN 58 (H) 04/20/2022     (L) 04/20/2022    GFR >60 05/17/2013    K 4.5 04/20/2022    CL 95 (L) 04/20/2022    CO2 19 (L) 04/20/2022     Lab Results   Component Value Date    WBC 6.9 04/20/2022    HGB 10.1 (L) 04/20/2022    HCT 31.5 (L) 04/20/2022    MCV 92.0 04/20/2022     04/20/2022

## 2022-04-21 NOTE — PROGRESS NOTES
Progress Note    HISTORY     CC:  Shortness of breath           We are following for ESRD on RRT       Subjective/   HPI:  Breathing better. Had [facility] exchange today. Eating dinner.   No new complaints     ROS:  Constitutional:  No fevers, No Chills, + weakness  Cardiovascular:  No palpations, no edema  Respiratory:  No wheezing, + SOB  Skin:  No rash, no itching  :  No hematuria, no dysuria     Social Hx:  No Family at the bedside     Past Medical and Surgical History:  - Reviewed, no changes     EXAM       Objective/     Vitals:    04/21/22 1333 04/21/22 1345 04/21/22 1438 04/21/22 1606   BP: 126/68 123/72 137/80 113/72   Pulse: 63 62 67 73   Resp: 16   16   Temp: 97.8 °F (36.6 °C)   97.9 °F (36.6 °C)   TempSrc: Oral   Oral   SpO2:  97%  96%   Weight:       Height:         24HR INTAKE/OUTPUT:      Intake/Output Summary (Last 24 hours) at 4/21/2022 1836  Last data filed at 4/21/2022 3578  Gross per 24 hour   Intake 0 ml   Output 0 ml   Net 0 ml     Constitutional:  Ill appearing   Eyes:  Pupils reactive, sclera clear   Neck:  Normal thyroid, no masses   Cardiovascular:  Regular, no rub  Respiratory:  + distress, on bilevel   Psychiatry:  Flat mood/affect, somnolent   Abdomen: +bs, soft, nt, no masses   Musculoskeletal: No LE edema, no clubbing   Lymphatics:  No LAD in neck, no supraclavicular nodes   Access:  [facility]      MEDICAL DECISION MAKING       Data/  Recent Labs     04/20/22  0755   WBC 6.9   HGB 10.1*   HCT 31.5*   MCV 92.0        Recent Labs     04/19/22  1853 04/20/22  0755   * 133*   K 4.2 4.5   CL 94* 95*   CO2 20* 19*   GLUCOSE 213* 147*   PHOS  --  6.8*   BUN 47* 58*   CREATININE 7.7* 8.9*   LABGLOM 7* 6*   GFRAA 9* 8*       Assessment/     ESRD:  Due to diabetic nephropathy  - Schedule:  MWF at [address] of breath:  - Heart failure with poor fluid management on dialysis due to frequent missed treatments and high IDWG  - Improving oxygen requirements with UF  - Noted ABG last night      Hyponatremia:  Due to high IDWG     Anemia of chronic disease-CKD:   - Hb is at target     Hypertension:  Range elevated, volume and stress mediated. Improved nicely with dialysis     Plan/     Down to 105.5 kg, this is pretty good for him.     S/p [facility]  HD tomorrow and likely could be discharged after HD     -----------------------------  Aleksandar Sorenson M.D.   [facility]

## 2022-04-21 NOTE — PROGRESS NOTES
04/21/22 0353   NIV Type   $NIV $Daily Charge   Skin Assessment Clean, dry, & intact   Equipment Type v60   Mode Bilevel   Mask Type Full face mask   Mask Size Large   Settings/Measurements   IPAP 15 cmH20   CPAP/EPAP 6 cmH2O   Resp 16   FiO2  35 %   Vt Exhaled 427 mL   Minute Volume 5.6 Liters   Mask Leak (lpm) 60 lpm

## 2022-04-21 NOTE — PROGRESS NOTES
0'\"   [phone] Hospital or Facility: [facility] From: [place] Sender RE: Lizzette Range 1970 RM: [id number] patient is back from getting his vas cath exchanged, can i reorder his diet now?  Need Callback: NO CALLBACK REQ C4 PROGRESSIVE CARE  Read 1:25 PM   0'\"   4/21/22 1:25 PM   Renal diet  0'\"   4/21/22 1:25 PM   ok, thank you  Unread

## 2022-04-21 NOTE — OR NURSING
Post Ernesto  2 - Able to move 4 extremities voluntarily on command  1 - BP+/- 20-50mmHg of normal  2 - Fully awake  1 - Needs oxygen to maintain oxygen saturation >90%  1 - Dyspnic, shallow, or limited breathing

## 2022-04-22 LAB
BLOOD CULTURE, ROUTINE: NORMAL
CULTURE, BLOOD 2: NORMAL
GLUCOSE BLD-MCNC: 143 MG/DL (ref 70–99)
GLUCOSE BLD-MCNC: 161 MG/DL (ref 70–99)
GLUCOSE BLD-MCNC: 185 MG/DL (ref 70–99)
GLUCOSE BLD-MCNC: 209 MG/DL (ref 70–99)
PERFORMED ON: ABNORMAL

## 2022-04-22 PROCEDURE — 6360000002 HC RX W HCPCS: Performed by: INTERNAL MEDICINE

## 2022-04-22 PROCEDURE — 94761 N-INVAS EAR/PLS OXIMETRY MLT: CPT

## 2022-04-22 PROCEDURE — 6370000000 HC RX 637 (ALT 250 FOR IP): Performed by: HOSPITALIST

## 2022-04-22 PROCEDURE — 2060000000 HC ICU INTERMEDIATE R&B

## 2022-04-22 PROCEDURE — 94660 CPAP INITIATION&MGMT: CPT

## 2022-04-22 PROCEDURE — 6370000000 HC RX 637 (ALT 250 FOR IP): Performed by: INTERNAL MEDICINE

## 2022-04-22 PROCEDURE — 2700000000 HC OXYGEN THERAPY PER DAY

## 2022-04-22 PROCEDURE — 90935 HEMODIALYSIS ONE EVALUATION: CPT

## 2022-04-22 RX ADMIN — GABAPENTIN 100 MG: 100 CAPSULE ORAL at 14:26

## 2022-04-22 RX ADMIN — INSULIN LISPRO 1 UNITS: 100 INJECTION, SOLUTION INTRAVENOUS; SUBCUTANEOUS at 08:23

## 2022-04-22 RX ADMIN — OXYCODONE AND ACETAMINOPHEN 1 TABLET: 7.5; 325 TABLET ORAL at 04:46

## 2022-04-22 RX ADMIN — QUETIAPINE FUMARATE 25 MG: 25 TABLET ORAL at 20:33

## 2022-04-22 RX ADMIN — GABAPENTIN 100 MG: 100 CAPSULE ORAL at 20:34

## 2022-04-22 RX ADMIN — METOPROLOL SUCCINATE 50 MG: 50 TABLET, EXTENDED RELEASE ORAL at 20:33

## 2022-04-22 RX ADMIN — DULOXETINE HYDROCHLORIDE 30 MG: 30 CAPSULE, DELAYED RELEASE ORAL at 12:31

## 2022-04-22 RX ADMIN — PRAVASTATIN SODIUM 40 MG: 40 TABLET ORAL at 12:31

## 2022-04-22 RX ADMIN — ALTEPLASE 2 MG: 2.2 INJECTION, POWDER, LYOPHILIZED, FOR SOLUTION INTRAVENOUS at 09:40

## 2022-04-22 RX ADMIN — ALTEPLASE 4 MG: 2.2 INJECTION, POWDER, LYOPHILIZED, FOR SOLUTION INTRAVENOUS at 11:42

## 2022-04-22 RX ADMIN — CLOPIDOGREL BISULFATE 75 MG: 75 TABLET, FILM COATED ORAL at 12:31

## 2022-04-22 RX ADMIN — METOPROLOL SUCCINATE 50 MG: 50 TABLET, EXTENDED RELEASE ORAL at 12:31

## 2022-04-22 RX ADMIN — GABAPENTIN 100 MG: 100 CAPSULE ORAL at 08:23

## 2022-04-22 RX ADMIN — OXYCODONE AND ACETAMINOPHEN 1 TABLET: 7.5; 325 TABLET ORAL at 12:31

## 2022-04-22 ASSESSMENT — PAIN DESCRIPTION - DESCRIPTORS
DESCRIPTORS: ACHING

## 2022-04-22 ASSESSMENT — PAIN DESCRIPTION - LOCATION
LOCATION: BACK;LEG
LOCATION: BACK

## 2022-04-22 ASSESSMENT — PAIN SCALES - GENERAL
PAINLEVEL_OUTOF10: 6
PAINLEVEL_OUTOF10: 8
PAINLEVEL_OUTOF10: 9
PAINLEVEL_OUTOF10: 10

## 2022-04-22 ASSESSMENT — PAIN DESCRIPTION - ORIENTATION
ORIENTATION: LOWER

## 2022-04-22 NOTE — PROGRESS NOTES
Progress Note    HISTORY     CC:  Shortness of breath           We are following for ESRD on RRT       Subjective/   HPI:  Seen on dialysis. Line is not working despite being exchanged yesterday. CXR reviewed and perfect positioning of TDC.      ROS:  Constitutional:  No fevers, No Chills, + weakness  Cardiovascular:  No palpations, no edema  Respiratory:  No wheezing, + SOB  Skin:  No rash, no itching  :  No hematuria, no dysuria     Social Hx:  No Family at the bedside     Past Medical and Surgical History:  - Reviewed, no changes     EXAM       Objective/     Vitals:    04/22/22 0344 04/22/22 0450 04/22/22 0822 04/22/22 1100   BP:  (!) 144/85 (!) 149/82 128/68   Pulse:  68 97 62   Resp: 17 17 18 18   Temp:  98 °F (36.7 °C) 98 °F (36.7 °C) 98.5 °F (36.9 °C)   TempSrc:  Axillary Oral    SpO2:  94% 94%    Weight:  240 lb 4.8 oz (109 kg)  239 lb 13.8 oz (108.8 kg)   Height:         24HR INTAKE/OUTPUT:      Intake/Output Summary (Last 24 hours) at 4/22/2022 1116  Last data filed at 4/22/2022 1100  Gross per 24 hour   Intake 980 ml   Output 219 ml   Net 761 ml     Constitutional:  Ill appearing   Eyes:  Pupils reactive, sclera clear   Neck:  Normal thyroid, no masses   Cardiovascular:  Regular, no rub  Respiratory:  + distress, on bilevel   Psychiatry:  Flat mood/affect, somnolent   Abdomen: +bs, soft, nt, no masses   Musculoskeletal: No LE edema, no clubbing   Lymphatics:  No LAD in neck, no supraclavicular nodes   Access:  [facility]      MEDICAL DECISION MAKING       Data/  Recent Labs     04/20/22  0755   WBC 6.9   HGB 10.1*   HCT 31.5*   MCV 92.0        Recent Labs     04/19/22  1853 04/20/22  0755   * 133*   K 4.2 4.5   CL 94* 95*   CO2 20* 19*   GLUCOSE 213* 147*   PHOS  --  6.8*   BUN 47* 58*   CREATININE 7.7* 8.9*   LABGLOM 7* 6*   GFRAA 9* 8*       Assessment/     ESRD:  Due to diabetic nephropathy  - Schedule:  MWF at [facility]. Weight is down to 105. 5      Shortness of breath:  - Heart failure with poor fluid management on dialysis due to frequent missed treatments and high IDWG  - Improving oxygen requirements with UF  - Noted ABG last night      Hyponatremia:  Due to high IDWG     Anemia of chronic disease-CKD:   - Hb is at target     Hypertension:  Range elevated, volume and stress mediated. Improved nicely with dialysis     Plan/     New TDC did not work. CXR looks to be perfect position. Unclear if there could be a recurrent fibrin sheath but that if very fast to develop. Will lock with cathflo and attempt dialysis again tomorrow.   If catheter still does not work might need second exchange    -----------------------------  Jackee Gosselin, M.D.   [facility]

## 2022-04-22 NOTE — CARE COORDINATION
Per discussion with MD . Dialysis cath not working despite being exchanged yesterday. CXR reviewed and perfect positioning of TDC. Unclear per Dr. Marce Yo if there could be recurrent fibrin sheath but that would be very fast to develop. Will lock with cathflo and attempt dialysis again tomorrow. If does not work may need second exchange. Plan for home with [facility]. Needs home O2. Kavon has referral and David Manriquez already working on this. Need to coordinate with home care at discharge due to fact that he and his wife have no working telephone, so home care can plan start of care when they are home on a non HD day. Will likely need transport home.

## 2022-04-22 NOTE — CARE COORDINATION
O2 saturation at REST on ROOM AIR = ____97__%     If saturation is 89% or above please proceed with steps 2 and 3. .........      O2 saturation with AMBULATION of ___20__ feet on ROOM AIR = ____81 %   O2 saturation with AMBULATION on ____4___ liter/min = __89____%     DCP notified: ______

## 2022-04-22 NOTE — PROGRESS NOTES
04/22/22 0344   NIV Type   NIV Started/Stopped On   Equipment Type V60   Mode Bilevel   Mask Type Full face mask   Mask Size Large   Settings/Measurements   IPAP 15 cmH20   CPAP/EPAP 6 cmH2O   Rate Ordered 10   Resp 17   FiO2  35 %   Vt Exhaled 672 mL   Minute Volume 8.8 Liters   Mask Leak (lpm) 6 lpm   Comfort Level Good   Using Accessory Muscles No   Alarm Settings   Alarms On Y

## 2022-04-22 NOTE — PROGRESS NOTES
04/22/22 0031   NIV Type   $NIV $Daily Charge   NIV Started/Stopped On   Equipment Type V60   Mode Bilevel   Mask Type Full face mask   Mask Size Large   Settings/Measurements   IPAP 15 cmH20   CPAP/EPAP 6 cmH2O   Rate Ordered 10   Resp 16   FiO2  35 %   I Time/ I Time % 1 s   Vt Exhaled 699 mL   Minute Volume 10.2 Liters   Mask Leak (lpm) 0 lpm   Comfort Level Good   Using Accessory Muscles No   Alarm Settings   Alarms On Y

## 2022-04-22 NOTE — PLAN OF CARE
Problem: Falls - Risk of:  Goal: Will remain free from falls  Description: Will remain free from falls  Outcome: Progressing  Goal: Absence of physical injury  Description: Absence of physical injury  Outcome: Progressing   Pt educated on reason for bed alarm and afety measures, pt refused bed alarm and teaching around fall risk strategies; agreed to non-skid socks. Will frequently round on pt for safety. AVASYS in place.       Problem: Safety:  Goal: Free from accidental physical injury  Description: Free from accidental physical injury  Outcome: Progressing  Goal: Free from intentional harm  Description: Free from intentional harm  Outcome: Progressing     Problem: Pain:  Goal: Patient's pain/discomfort is manageable  Description: Patient's pain/discomfort is manageable  Outcome: Progressing     Problem: Skin Integrity:  Goal: Skin integrity will stabilize  Description: Skin integrity will stabilize  Outcome: Progressing     Problem: Airway Clearance - Ineffective:  Goal: Ability to maintain a clear airway will improve  Description: Ability to maintain a clear airway will improve  Outcome: Progressing     Problem: Breathing Pattern - Ineffective:  Goal: Ability to achieve and maintain a regular respiratory rate will improve  Description: Ability to achieve and maintain a regular respiratory rate will improve  Outcome: Progressing     Problem: Gas Exchange - Impaired:  Goal: Levels of oxygenation will improve  Description: Levels of oxygenation will improve  Outcome: Progressing

## 2022-04-22 NOTE — CARE COORDINATION
Patient discharging home later after HD. Will need home care orders for [facility]. Sandy Landaverde with [facility] following for home O2 needs and will arrange for discharge. In HD now.

## 2022-04-22 NOTE — PROGRESS NOTES
04/21/22 2012   NIV Type   NIV Started/Stopped On   Equipment Type V60   Mode Bilevel   Mask Type Full face mask   Mask Size Large   Settings/Measurements   IPAP 15 cmH20   CPAP/EPAP 6 cmH2O   Rate Ordered 10   Resp 14   FiO2  35 %   I Time/ I Time % 1 s   Vt Exhaled 557 mL   Minute Volume 6.4 Liters   Mask Leak (lpm) 40 lpm   Comfort Level Good   Using Accessory Muscles No   Alarm Settings   Alarms On Y

## 2022-04-22 NOTE — PROGRESS NOTES
Hospitalist Progress Note    Patient:  Krish Gonzales  Unit/Bed:0433/0433-01   YOB: 1970       MRN: [id number] Acct: [de-identified]  PCP: Ji Harrell MD    Date of Admission: 4/18/2022  --------------------------    Chief Complaint:     Shortness of breath    Hospital Course:     Krish Gonzales is a 45 y.o. male hospitalized on 4/18/2022   with history of end-stage renal failure CHF, A. fib, hospitalized for recurrent shortness of breath and acute hypoxic respiratory failure secondary to nonadherence to HD. Nora Dye Assessment/plan:        Acute hypoxic hypercapnic respiratory failure secondary to volume overload/acute CHF from end-stage renal failure  -   Respiratory status improved, continue oxygen which we will prescribe on discharge  Continue BiPAP  -     Acute on chronic CHF, undetermined type, EF of 45% secondary to nonadherence to hemodialysis  Unable to do hemodialysis today secondary to malfunctioning HD catheter      Nonspecific troponin elevation in the setting of end-stage renal failure    Elevated procalcitonin, no clinical evidence to support pneumonia  Antibiotic discontinued    COPD without exacerbation  -Respiratory care protocol, oxygen supplementation as needed,      Aortic stenosis/bicuspid aortic valve status post TAVR    Coronary artery disease  Continue selected home medication occluding Plavix, beta-blocker and statin    Diabetes mellitus type 2 with multiple complication  Monitor blood sugar, insulin as required      Right fourth toe ulcer, continue wound care    End-stage renal failure/   Unable to do hemodialysis secondary to malfunctioning catheter    -Mild hyponatremia  , stable, continue monitoring      Mood disorder, continue Cymbalta, Seroquel dose reduced    Dyslipidemia, CAD status post prior stents  Continue statin    afib Anticoagulation with Eliquis.     Code Status: Full Code         DVT prophylaxis: Eliquis     Disposition: Plan to exchange hemodialysis catheter today, hemodialysis treatment tomorrow, hopefully discharge home after that. I discussed my thought processes at length with patient/family and patient understood. Question and concerns  Addressed      Discussed with RN      ----------------      Subjective:     Patient seen and examined  No new complaint  Underwent exchange of HD catheter but not working, plan to exchange by IR and attempt hemodialysis tomorrow    Diet: ADULT ORAL NUTRITION SUPPLEMENT; AM Snack, PM Snack; Renal Oral Supplement  ADULT DIET; Regular; Low Potassium (Less than 3000 mg/day)    OBJECTIVE     Exam:  /67   Pulse 67   Temp 97.7 °F (36.5 °C)   Resp 20   Ht 5' 6\" (1.676 m)   Wt 239 lb 13.8 oz (108.8 kg)   SpO2 97%   BMI 38.71 kg/m²          Gen: Not in distress. Alert. Chronically ill  Head: Normocephalic. Atraumatic. Eyes: Conjunctivae/corneas clear. ENT: Oral mucosa moist  Neck: No JVD. No obvious thyromegaly. CVS: Nml S1S2, no murmur  , RRR  Pulmomary: Fair air entry in both lung. Gastrointestinal: Soft, non tender, non distend, . Musculoskeletal: Improved edema. Warm  Neuro: No focal deficit. Moves extremity spontaneously. Psychiatry: Appropriate affect. Not agitated.     Medications:  Reviewed    Infusion Medications    dextrose      sodium chloride       Scheduled Medications    insulin lispro  0-3 Units SubCUTAneous Nightly    insulin lispro  0-6 Units SubCUTAneous TID WC    QUEtiapine  25 mg Oral Nightly    clopidogrel  75 mg Oral Daily    docusate sodium  100 mg Oral Daily    DULoxetine  30 mg Oral Daily    gabapentin  100 mg Oral TID    metoprolol succinate  50 mg Oral BID    pantoprazole  40 mg Oral QAM AC    pravastatin  40 mg Oral Daily    sodium chloride flush  5-40 mL IntraVENous 2 times per day     PRN Meds: alteplase, ipratropium-albuterol, oxyCODONE-acetaminophen, glucagon (rDNA), dextrose, dextrose bolus (hypoglycemia) **OR** dextrose bolus (hypoglycemia), sodium chloride flush, sodium chloride, ondansetron **OR** ondansetron, polyethylene glycol, acetaminophen **OR** acetaminophen      Intake/Output Summary (Last 24 hours) at 4/22/2022 1531  Last data filed at 4/22/2022 1100  Gross per 24 hour   Intake 980 ml   Output 219 ml   Net 761 ml             Labs:   Recent Labs     04/20/22  0755   WBC 6.9   HGB 10.1*   HCT 31.5*        Recent Labs     04/19/22  1853 04/20/22  0755   * 133*   K 4.2 4.5   CL 94* 95*   CO2 20* 19*   BUN 47* 58*   CREATININE 7.7* 8.9*   CALCIUM 9.0 9.4   PHOS  --  6.8*     No results for input(s): AST, ALT, BILIDIR, BILITOT, ALKPHOS in the last 72 hours. No results for input(s): INR in the last 72 hours. No results for input(s): Earlis Capitan in the last 72 hours. Urinalysis:      Lab Results   Component Value Date    NITRU Negative 03/18/2022    WBCUA  03/18/2022    BACTERIA 1+ 03/18/2022    RBCUA 3-4 03/18/2022    BLOODU TRACE-INTACT 03/18/2022    SPECGRAV 1.020 03/18/2022    GLUCOSEU 100 03/18/2022       Radiology:  IR TUNNELED CVC PLACE WO SQ PORT/PUMP > 5 YEARS   Preliminary Result   Status post successful fluoroscopically guided replacement of left internal   jugular tunneled dialysis catheter as described above. XR CHEST PORTABLE   Final Result   Pulmonary vascular congestion/interstitial edema. Probable small bilateral   pleural effusions.                      Electronically signed by Jyothi Sierra MD on 4/22/2022 at 3:31 PM

## 2022-04-23 LAB
ALBUMIN SERPL-MCNC: 3.5 G/DL (ref 3.4–5)
ANION GAP SERPL CALCULATED.3IONS-SCNC: 18 MMOL/L (ref 3–16)
BASOPHILS ABSOLUTE: 0.1 K/UL (ref 0–0.2)
BASOPHILS RELATIVE PERCENT: 0.8 %
BUN BLDV-MCNC: 77 MG/DL (ref 7–20)
CALCIUM SERPL-MCNC: 9.4 MG/DL (ref 8.3–10.6)
CHLORIDE BLD-SCNC: 94 MMOL/L (ref 99–110)
CO2: 22 MMOL/L (ref 21–32)
CREAT SERPL-MCNC: 10.6 MG/DL (ref 0.9–1.3)
EOSINOPHILS ABSOLUTE: 0.5 K/UL (ref 0–0.6)
EOSINOPHILS RELATIVE PERCENT: 5.4 %
GFR AFRICAN AMERICAN: 6
GFR NON-AFRICAN AMERICAN: 5
GLUCOSE BLD-MCNC: 160 MG/DL (ref 70–99)
GLUCOSE BLD-MCNC: 163 MG/DL (ref 70–99)
GLUCOSE BLD-MCNC: 166 MG/DL (ref 70–99)
GLUCOSE BLD-MCNC: 167 MG/DL (ref 70–99)
GLUCOSE BLD-MCNC: 176 MG/DL (ref 70–99)
HCT VFR BLD CALC: 29 % (ref 40.5–52.5)
HEMOGLOBIN: 9.3 G/DL (ref 13.5–17.5)
LYMPHOCYTES ABSOLUTE: 1.2 K/UL (ref 1–5.1)
LYMPHOCYTES RELATIVE PERCENT: 13.2 %
MCH RBC QN AUTO: 29.6 PG (ref 26–34)
MCHC RBC AUTO-ENTMCNC: 32.2 G/DL (ref 31–36)
MCV RBC AUTO: 92 FL (ref 80–100)
MONOCYTES ABSOLUTE: 0.7 K/UL (ref 0–1.3)
MONOCYTES RELATIVE PERCENT: 7.9 %
NEUTROPHILS ABSOLUTE: 6.4 K/UL (ref 1.7–7.7)
NEUTROPHILS RELATIVE PERCENT: 72.7 %
PDW BLD-RTO: 15.8 % (ref 12.4–15.4)
PERFORMED ON: ABNORMAL
PHOSPHORUS: 7.4 MG/DL (ref 2.5–4.9)
PLATELET # BLD: 201 K/UL (ref 135–450)
PMV BLD AUTO: 8.4 FL (ref 5–10.5)
POTASSIUM SERPL-SCNC: 5.5 MMOL/L (ref 3.5–5.1)
RBC # BLD: 3.15 M/UL (ref 4.2–5.9)
SODIUM BLD-SCNC: 134 MMOL/L (ref 136–145)
WBC # BLD: 8.8 K/UL (ref 4–11)

## 2022-04-23 PROCEDURE — 94660 CPAP INITIATION&MGMT: CPT

## 2022-04-23 PROCEDURE — 2580000003 HC RX 258: Performed by: INTERNAL MEDICINE

## 2022-04-23 PROCEDURE — 6370000000 HC RX 637 (ALT 250 FOR IP): Performed by: INTERNAL MEDICINE

## 2022-04-23 PROCEDURE — 2060000000 HC ICU INTERMEDIATE R&B

## 2022-04-23 PROCEDURE — 90935 HEMODIALYSIS ONE EVALUATION: CPT

## 2022-04-23 PROCEDURE — 85025 COMPLETE CBC W/AUTO DIFF WBC: CPT

## 2022-04-23 PROCEDURE — 36415 COLL VENOUS BLD VENIPUNCTURE: CPT

## 2022-04-23 PROCEDURE — 80069 RENAL FUNCTION PANEL: CPT

## 2022-04-23 PROCEDURE — 6370000000 HC RX 637 (ALT 250 FOR IP): Performed by: HOSPITALIST

## 2022-04-23 RX ADMIN — METOPROLOL SUCCINATE 50 MG: 50 TABLET, EXTENDED RELEASE ORAL at 22:13

## 2022-04-23 RX ADMIN — GABAPENTIN 100 MG: 100 CAPSULE ORAL at 14:50

## 2022-04-23 RX ADMIN — PANTOPRAZOLE SODIUM 40 MG: 40 TABLET, DELAYED RELEASE ORAL at 06:08

## 2022-04-23 RX ADMIN — OXYCODONE AND ACETAMINOPHEN 1 TABLET: 7.5; 325 TABLET ORAL at 09:17

## 2022-04-23 RX ADMIN — METOPROLOL SUCCINATE 50 MG: 50 TABLET, EXTENDED RELEASE ORAL at 11:23

## 2022-04-23 RX ADMIN — GABAPENTIN 100 MG: 100 CAPSULE ORAL at 22:13

## 2022-04-23 RX ADMIN — SODIUM CHLORIDE, PRESERVATIVE FREE 10 ML: 5 INJECTION INTRAVENOUS at 22:13

## 2022-04-23 RX ADMIN — DULOXETINE HYDROCHLORIDE 30 MG: 30 CAPSULE, DELAYED RELEASE ORAL at 11:23

## 2022-04-23 RX ADMIN — CLOPIDOGREL BISULFATE 75 MG: 75 TABLET, FILM COATED ORAL at 11:23

## 2022-04-23 RX ADMIN — SODIUM ZIRCONIUM CYCLOSILICATE 10 G: 5 POWDER, FOR SUSPENSION ORAL at 14:50

## 2022-04-23 RX ADMIN — GABAPENTIN 100 MG: 100 CAPSULE ORAL at 09:17

## 2022-04-23 RX ADMIN — QUETIAPINE FUMARATE 25 MG: 25 TABLET ORAL at 22:13

## 2022-04-23 RX ADMIN — OXYCODONE AND ACETAMINOPHEN 1 TABLET: 7.5; 325 TABLET ORAL at 18:13

## 2022-04-23 RX ADMIN — PRAVASTATIN SODIUM 40 MG: 40 TABLET ORAL at 11:23

## 2022-04-23 ASSESSMENT — PAIN SCALES - GENERAL
PAINLEVEL_OUTOF10: 10
PAINLEVEL_OUTOF10: 0
PAINLEVEL_OUTOF10: 10

## 2022-04-23 ASSESSMENT — PAIN DESCRIPTION - ORIENTATION
ORIENTATION: LOWER
ORIENTATION: LOWER

## 2022-04-23 ASSESSMENT — PAIN DESCRIPTION - LOCATION
LOCATION: BACK
LOCATION: BACK

## 2022-04-23 NOTE — PROGRESS NOTES
04/22/22 2101   NIV Type   NIV Started/Stopped On   Equipment Type V60   Mode Bilevel   Mask Type Full face mask   Mask Size Large   Settings/Measurements   IPAP 15 cmH20   CPAP/EPAP 6 cmH2O   Rate Ordered 10   Resp 17   FiO2  35 %   I Time/ I Time % 1 s   Vt Exhaled 702 mL   Minute Volume 10 Liters   Mask Leak (lpm) 22 lpm   Comfort Level Good   Using Accessory Muscles No   Alarm Settings   Alarms On Y

## 2022-04-23 NOTE — PROGRESS NOTES
Pt HD cath not working. Able to aspirate arterial port with difficulty. Unable to fully aspirate venous port. MD aware with NO: power flush, if cont to not work pt will get line exchange on Monday. Attempted power flush, tx initiated, line cont to not work. MD aware and NO to get line exchange on Monday. Pt aware. Report given to primary RN.  Pt stable upon leaving unit

## 2022-04-23 NOTE — PROGRESS NOTES
04/23/22 0411   NIV Type   NIV Started/Stopped On   Equipment Type V60   Mode Bilevel   Mask Type Full face mask   Mask Size Large   Settings/Measurements   IPAP 15 cmH20   CPAP/EPAP 6 cmH2O   Rate Ordered 10   Resp 15   FiO2  35 %   I Time/ I Time % 1 s   Vt Exhaled 715 mL   Minute Volume 11 Liters   Mask Leak (lpm) 24 lpm   Comfort Level Good   Using Accessory Muscles No   Alarm Settings   Alarms On Y

## 2022-04-23 NOTE — PROGRESS NOTES
Patient did well overnight. Wore bipap the entire night while sleepimg. Patient is now awake and 3L NC placed back on pt, bipap placed on stby. VSS. No issues or concerns at this time. Patient states all needs are currently met; however he states he would like to eat breakfast this AM before going to HD. I will pass this on to dayshift RN.

## 2022-04-23 NOTE — PROGRESS NOTES
Outpatient Physical Therapy Ortho Treatment Note  [facility]     Patient Name: Chuck Zheng  : 1948  MRN: [id number]  Today's Date: 2022      Visit Date: 2022    Visit Dx:    ICD-10-CM ICD-9-CM   1. Acute pain of left knee  M25.562 719.46   2. Difficulty walking  R26.2 719.7       Patient Active Problem List   Diagnosis   • Atopic rhinitis   • Benign essential hypertension   • Atherosclerosis of coronary artery   • Impaired fasting blood sugar   • Pure hypercholesterolemia   • Cobalamin deficiency   • Primary insomnia   • Health care maintenance   • Elevated PSA, less than 10 ng/ml   • PLACIDO on CPAP Check Pressure +11.5   • History of myocardial infarction   • Benign prostatic hyperplasia with incomplete bladder emptying   • Fatty liver   • Left anterior shoulder pain   • Primary osteoarthritis of left knee   • S/P knee surgery        Past Medical History:   Diagnosis Date   • Adhesive capsulitis of shoulder    • Arthritis    • Benign non-nodular prostatic hyperplasia 2016   • Benign prostatic hypertrophy    • Coronary artery disease    • Functional diarrhea 2017    Resolved with gluten free diet   • H/O cardiovascular stress test      normal   • Hemorrhoid    • History of COVID-19 2020   • Hyperlipidemia    • Hypertension    • Myocardial infarction (HCC)      2007, distal RCA 80% stenosis, s/p PTCA   • Nephrolithiasis     left kidney   • Obstructive sleep apnea     BIPAP   • Primary insomnia 2016   • Rupture of flexor tendon of hand    • Syncope     2004 neg w/u        Past Surgical History:   Procedure Laterality Date   • CHOLECYSTECTOMY     • COLONOSCOPY      2003, nl, , 2014 , nl, needs C-scope in    • CORONARY STENT PLACEMENT      2007 distal RCA   • EYE SURGERY      JOEY CATARACTS W IOL   • HAND SURGERY Right     THUMB SX   • KNEE ARTHROSCOPY Right        • KNEE ARTHROSCOPY Left 1/15/2019    Procedure: LEFT KNEE  Patient back from dialysis, cmu aware. Will plan for line exchange on Monday, per dialysis nurse. "ARTHROSCOPY,  PARTIAL MEDIAL MENISECTOMY;  Surgeon: Mason Ochoa MD;  Location: [facility];  Service: Orthopedics   • TONSILLECTOMY     • TOTAL KNEE ARTHROPLASTY Left 12/17/2021    Procedure: TOTAL KNEE ARTHROPLASTY;  Surgeon: Neal Goodwin MD;  Location: [facility] OR;  Service: Orthopedics;  Laterality: Left;                        PT Assessment/Plan     Row Name 04/06/22 1628          PT Assessment    Assessment Comments Pt reporting increased stiffness since over the weekend, continued through today and scheduled appointment at MD office next week due to slight inc in pain levels. Continued with progressed strengthening including 8\" step ups along with passive stretching/modalities for knee flex ROM. Pt continues with limited progress in ROM despite extensive intervention including manual techniques, passive stretching, heat and estim and will return to MD next week for follow up. Pt with antalgic gait pattern upon initiating ambulation following sitting for any length of time which improves after 10-20 steps.  -CN            PT Plan    PT Plan Comments Pt with 1 remaining scheduled visit then will follow up at MD office. Advised pt to call if he requires further PT appointments following MD appointment.  -CN           User Key  (r) = Recorded By, (t) = Taken By, (c) = Cosigned By    Initials Name Provider Type    CN Laney Russell, PT Physical Therapist                 Modalities     Row Name 04/06/22 1500             Moist Heat    MH Applied Yes  -CN      Location L quad with passive flex stretch (towel roll under distal femur, gait belt providing stretch around table leg)  -CN      PT Moist Heat Minutes 10  -CN              ELECTRICAL STIMULATION    Attended/Unattended Unattended  with MH/passive stretch  -CN      Stimulation Type IFC  -CN      Location/Electrode Placement/Other L knee  -CN            User Key  (r) = Recorded By, (t) = Taken By, (c) = Cosigned By    Initials Name Provider " "Type    CN Laney Russell, PT Physical Therapist               OP Exercises     Row Name 04/06/22 1500             Subjective Comments    Subjective Comments It actually has been hurting since Saturday. It is better today. I played in a Bridge tournament and was sitting for a while but I have done that before and it was ok. I made an appointment with the doc next week.  -CN              Subjective Pain    Able to rate subjective pain? yes  -CN      Pre-Treatment Pain Level 2  -CN              Total Minutes    19413 - Gait Training Minutes  10  -CN      29805 - PT Therapeutic Exercise Minutes 28  -CN              Exercise 2    Exercise Name 2 step over/back leonides then gait training  -CN      Cueing 2 Verbal;Demo  -CN      Time 2 10 min  -CN      Additional Comments gait in backhallway with mirror; emphasis on swing through/knee flex, heel strike, equal step length, no antalgia  -CN              Exercise 3    Exercise Name 3 Stair knee flex stretch  -CN      Cueing 3 Verbal  -CN      Reps 3 3  -CN      Time 3 20sec  -CN              Exercise 4    Exercise Name 4 Nustep, L5  -CN      Cueing 4 Verbal  -CN      Time 4 5 min  -CN      Additional Comments L5  -CN              Exercise 6    Exercise Name 6 8\" step up forward/lat  -CN      Cueing 6 Verbal;Demo  -CN      Reps 6 20 ea  -CN      Additional Comments opp knee  with flex; 1 UE  -CN              Exercise 7    Exercise Name 7 leg press  -CN      Cueing 7 Verbal;Demo  -CN      Sets 7 2  -CN      Reps 7 10  -CN      Additional Comments B 160#  -CN            User Key  (r) = Recorded By, (t) = Taken By, (c) = Cosigned By    Initials Name Provider Type    CN Laney Russell, PT Physical Therapist                              PT OP Goals     Row Name 04/06/22 1600          PT Short Term Goals    STG Date to Achieve 02/06/22  -CN     STG 1 Pt will report pain rated 2/10 at worst in order to demonstrate ability to return to normalized ADLs and " functional activities.  -CN     STG 1 Progress Met  -CN     STG 2 Pt will be independent with initial HEP for symptom management.  -CN     STG 2 Progress Met  -CN     STG 3 Pt will demonstrate knee AROM flex to 100 deg in order to improve ability to perform ADLs.  -CN     STG 3 Progress Progressing  -CN     STG 3 Progress Comments 103 flex with belt stretch today.  -CN            Long Term Goals    LTG Date to Achieve 03/06/22  -CN     LTG 1 Pt will be independent and compliant with advanced HEP for long term management of symptoms and prevention of future occurrence.  -CN     LTG 1 Progress Progressing  -CN     LTG 2 Pt will reduce level of perceived disability as measured by the KOS-ADL  to 90% in order to improve QOL.  -CN     LTG 2 Progress Progressing  -CN     LTG 3 Pt will demonstrate knee AROM 0-115 in order to improve ability to ascend/descend stairs and perform gait tasks.  -CN     LTG 3 Progress Ongoing  -CN     LTG 4 Pt will demonstrate L knee flex/ext strength to 4+/5 in order to improve endurance with recreational activities.  -CN     LTG 4 Progress Progressing  -CN     LTG 5 Pt will demonstrate proper gait pattern without AD in order to reduce stress on affected tissues.  -CN     LTG 5 Progress Ongoing  -CN           User Key  (r) = Recorded By, (t) = Taken By, (c) = Cosigned By    Initials Name Provider Type    Laney Welch, PT Physical Therapist                Therapy Education  Given: Symptoms/condition management, Posture/body mechanics, Mobility training  Program: Reinforced  How Provided: Verbal, Demonstration  Provided to: Patient  Level of Understanding: Verbalized, Demonstrated              Time Calculation:   Start Time: 1533  Stop Time: 1623  Time Calculation (min): 50 min  Timed Charges  07246 - PT Therapeutic Exercise Minutes: 28  31604 - Gait Training Minutes : 10  Untimed Charges  PT Moist Heat Minutes: 10  Total Minutes  Timed Charges Total Minutes: 38  Untimed Charges  Total Minutes: 10   Total Minutes: 48  Therapy Charges for Today     Code Description Service Date Service Provider Modifiers Qty    89894421844 HC PT THER PROC EA 15 MIN 4/6/2022 Laney Russell, PT GP 3    69547337010 HC PT HOT OR COLD PACK TREAT MCARE 4/6/2022 Laney Russell, PT GP 1    80095825082 HC PT ELECTRICAL STIM UNATTENDED 4/6/2022 Laney Russell, PT  1                    Laney Russell, PT  4/6/2022

## 2022-04-23 NOTE — PROGRESS NOTES
04/23/22 0002   NIV Type   $NIV $Daily Charge   NIV Started/Stopped On   Equipment Type V60   Mode Bilevel   Mask Type Full face mask   Mask Size Large   Settings/Measurements   IPAP 15 cmH20   CPAP/EPAP 6 cmH2O   Rate Ordered 10   Resp 15   FiO2  35 %   I Time/ I Time % 1 s   Vt Exhaled 618 mL   Minute Volume 8.8 Liters   Mask Leak (lpm) 16 lpm   Comfort Level Good   Using Accessory Muscles No   Alarm Settings   Alarms On Y

## 2022-04-23 NOTE — PROGRESS NOTES
Hospitalist Progress Note    Patient:  Daniel Kapoor  Unit/Bed:0433/0433-01   YOB: 1970       MRN: [id number] Acct: [de-identified]  PCP: Yadira Sánchez MD    Date of Admission: 4/18/2022  --------------------------    Chief Complaint:     Shortness of breath    Hospital Course:     Daniel Kapoor is a 45 y.o. male hospitalized on 4/18/2022   with history of end-stage renal failure CHF, A. fib, hospitalized for recurrent shortness of breath and acute hypoxic respiratory failure secondary to nonadherence to HD. Alyssa Larson Assessment/plan:        Acute hypoxic hypercapnic respiratory failure secondary to volume overload/acute CHF from end-stage renal failure     -Stable, continue current oxygen therapy  Continue BiPAP at night  -     Acute on chronic CHF, undetermined type, EF of 45% secondary to nonadherence to hemodialysis    -Patient unable to have hemodialysis again today. Plan for exchange of his hemodialysis catheter on Monday      Nonspecific troponin elevation in the setting of end-stage renal failure    Elevated procalcitonin, no clinical evidence to support pneumonia  Antibiotic discontinued    COPD without exacerbation  -Respiratory care protocol, oxygen supplementation as needed,      Aortic stenosis/bicuspid aortic valve status post TAVR    Coronary artery disease  Continue selected home medication occluding Plavix, beta-blocker and statin    Diabetes mellitus type 2 with multiple complication  Monitor blood sugar, insulin as required      Right fourth toe ulcer, continue wound care    End-stage renal failure/   Unable to do hemodialysis secondary to malfunctioning catheter    -Mild hyponatremia  , stable, continue monitoring      Mood disorder, continue Cymbalta, Seroquel dose reduced    Dyslipidemia, CAD status post prior stents  Continue statin    afib Anticoagulation with Eliquis.     Code Status: Full Code         DVT prophylaxis: Eliquis     Disposition: Discharge on Monday after exchanging his catheter, hemodialysis treatment        ----------------      Subjective:     Patient seen and examined  No complain     Diet: ADULT ORAL NUTRITION SUPPLEMENT; AM Snack, PM Snack; Renal Oral Supplement  ADULT DIET; Regular; Low Potassium (Less than 3000 mg/day)    OBJECTIVE     Exam:  /64   Pulse 65   Temp 97.7 °F (36.5 °C) (Oral)   Resp 18   Ht 5' 6\" (1.676 m)   Wt 244 lb 7.8 oz (110.9 kg)   SpO2 99%   BMI 39.46 kg/m²          Gen: Not in distress. Alert. Chronically ill  Head: Normocephalic. Atraumatic. Eyes: Conjunctivae/corneas clear. ENT: Oral mucosa moist  Neck: No JVD. No obvious thyromegaly. CVS: Nml S1S2, no murmur  , RRR  Pulmomary: Fair air entry in both lung. Gastrointestinal: Soft, non tender, non distend, . Musculoskeletal: Improved edema. Warm  Neuro: No focal deficit. Moves extremity spontaneously. Psychiatry: Appropriate affect. Not agitated.     Medications:  Reviewed    Infusion Medications    dextrose      sodium chloride       Scheduled Medications    insulin lispro  0-3 Units SubCUTAneous Nightly    insulin lispro  0-6 Units SubCUTAneous TID WC    QUEtiapine  25 mg Oral Nightly    clopidogrel  75 mg Oral Daily    docusate sodium  100 mg Oral Daily    DULoxetine  30 mg Oral Daily    gabapentin  100 mg Oral TID    metoprolol succinate  50 mg Oral BID    pantoprazole  40 mg Oral QAM AC    pravastatin  40 mg Oral Daily    sodium chloride flush  5-40 mL IntraVENous 2 times per day     PRN Meds: alteplase, ipratropium-albuterol, oxyCODONE-acetaminophen, glucagon (rDNA), dextrose, dextrose bolus (hypoglycemia) **OR** dextrose bolus (hypoglycemia), sodium chloride flush, sodium chloride, ondansetron **OR** ondansetron, polyethylene glycol, acetaminophen **OR** acetaminophen      Intake/Output Summary (Last 24 hours) at 4/23/2022 1234  Last data filed at 4/23/2022 2150  Gross per 24 hour   Intake 600 ml   Output --   Net 600 ml             Labs:   Recent Labs     04/23/22  0729   WBC 8.8   HGB 9.3*   HCT 29.0*        Recent Labs     04/23/22  0729   *   K 5.5*   CL 94*   CO2 22   BUN 77*   CREATININE 10.6*   CALCIUM 9.4   PHOS 7.4*     No results for input(s): AST, ALT, BILIDIR, BILITOT, ALKPHOS in the last 72 hours. No results for input(s): INR in the last 72 hours. No results for input(s): Di Beat in the last 72 hours. Urinalysis:      Lab Results   Component Value Date    NITRU Negative 03/18/2022    WBCUA  03/18/2022    BACTERIA 1+ 03/18/2022    RBCUA 3-4 03/18/2022    BLOODU TRACE-INTACT 03/18/2022    SPECGRAV 1.020 03/18/2022    GLUCOSEU 100 03/18/2022       Radiology:  IR TUNNELED CVC PLACE WO SQ PORT/PUMP > 5 YEARS   Preliminary Result   Status post successful fluoroscopically guided replacement of left internal   jugular tunneled dialysis catheter as described above. XR CHEST PORTABLE   Final Result   Pulmonary vascular congestion/interstitial edema. Probable small bilateral   pleural effusions.                      Electronically signed by Jesse Holland MD on 4/23/2022 at 12:34 PM

## 2022-04-23 NOTE — FLOWSHEET NOTE
04/22/22 2055   Assessment   Charting Type Shift assessment   Psychosocial   Psychosocial (WDL) X   Patient Behaviors Agitated;Irritable;Non-compliant   Neurological   Neuro (WDL) WDL   Level of Consciousness Alert (0)   Orientation Level Oriented X4   Cognition Follows commands   Speech Clear   Robert Coma Scale   Eye Opening 4   Best Verbal Response 5   Best Motor Response 6   [place] Coma Scale Score 15   HEENT (Head, Ears, Eyes, Nose, & Throat)   HEENT (WDL) X   Right Eye Glasses; Impaired vision   Left Eye Glasses; Impaired vision   Teeth Missing teeth   Respiratory   Respiratory (WDL) X   Respiratory Pattern Regular   Respiratory Depth Normal   Respiratory Quality/Effort Unlabored   Chest Assessment Chest expansion symmetrical;Trachea midline   L Breath Sounds Diminished   R Breath Sounds Diminished   Breath Sounds   Right Upper Lobe Diminished   Right Middle Lobe Diminished   Right Lower Lobe Diminished   Left Upper Lobe Diminished   Left Lower Lobe Diminished   Care Plan - Respiratory Goals   Achieves optimal ventilation and oxygenation Assess for changes in respiratory status; Assess for changes in mentation and behavior; Oxygen supplementation based on oxygen saturation or arterial blood gases; Assess and instruct to report shortness of breath or any respiratory difficulty; Respiratory therapy support as indicated   Cardiac   Cardiac (WDL) WDL   Cardiac Regularity Regular   Heart Sounds S1, S2   Cardiac Rhythm Sinus rhythm   Rhythm Interpretation   Pulse 62   Gastrointestinal   Abdominal (WDL) WDL   RUQ Bowel Sounds Active   LUQ Bowel Sounds Active   RLQ Bowel Sounds Active   LLQ Bowel Sounds Active   Abdomen Inspection Rounded   Tenderness Soft   Genitourinary   Genitourinary (WDL) X  (HD patient)   Suprapubic Tenderness No   Dysuria (Pain/Burning w/Urination) No   Difficulty Urinating/Starting Stream No   Urine Assessment   Urinary Status Anuria   Peripheral Vascular   Peripheral Vascular (WDL) X   Edema Generalized   Edema Generalized Trace   Skin Integumentary    Skin Integumentary (WDL) X   Skin Condition/Temp Dry; Warm   Skin Integrity Abrasion   Location scattered   Care Plan - Skin/Tissue Integrity Goals   Skin Integrity Remains Intact Monitor for areas of redness and/or skin breakdown   Musculoskeletal   Musculoskeletal (WDL) WDL

## 2022-04-23 NOTE — PROGRESS NOTES
Progress Note    HISTORY     CC:  Shortness of breath           We are following for ESRD on RRT       Subjective/   HPI:   Alteplase locked in both lumen of the dialysis line overnight. Attempted to draw from ports and unable to flush or draw. No flow at all and unable to do dialysis again. Feeling ok but K is 5.5.    ROS:  Constitutional:  No fevers, No Chills, + weakness  Cardiovascular:  No palpations, no edema  Respiratory:  No wheezing, + SOB  Skin:  No rash, no itching  :  No hematuria, no dysuria     Social Hx:  No Family at the bedside     Past Medical and Surgical History:  - Reviewed, no changes     EXAM       Objective/     Vitals:    04/23/22 0911 04/23/22 0931 04/23/22 1000 04/23/22 1120   BP: (!) 160/76 (!) 161/83 (!) 154/66 123/64   Pulse: 63 62 60 65   Resp: 18 24  18   Temp: 98 °F (36.7 °C) 98 °F (36.7 °C)  97.7 °F (36.5 °C)   TempSrc: Oral Oral  Oral   SpO2: 99%   99%   Weight:  244 lb 7.8 oz (110.9 kg)     Height:         24HR INTAKE/OUTPUT:      Intake/Output Summary (Last 24 hours) at 4/23/2022 1258  Last data filed at 4/23/2022 8419  Gross per 24 hour   Intake 600 ml   Output --   Net 600 ml     Constitutional:  Ill appearing   Eyes:  Pupils reactive, sclera clear   Neck:  Normal thyroid, no masses   Cardiovascular:  Regular, no rub  Respiratory:  + distress, on bilevel   Psychiatry:  Flat mood/affect, somnolent   Abdomen: +bs, soft, nt, no masses   Musculoskeletal: No LE edema, no clubbing   Lymphatics:  No LAD in neck, no supraclavicular nodes   Access:  [facility]      MEDICAL DECISION MAKING       Data/  Recent Labs     04/23/22  0729   WBC 8.8   HGB 9.3*   HCT 29.0*   MCV 92.0        Recent Labs     04/23/22  0729   *   K 5.5*   CL 94*   CO2 22   GLUCOSE 166*   PHOS 7.4*   BUN 77*   CREATININE 10.6*   LABGLOM 5*   GFRAA 6*       Assessment/     ESRD:  Due to diabetic nephropathy  - Schedule:  MWF at [facility]. Weight is down to 105. 5      Shortness of breath:  - Heart failure with poor fluid management on dialysis due to frequent missed treatments and high IDWG  - Improving oxygen requirements with UF  - Noted ABG last night      Hyponatremia:  Due to high IDWG     Anemia of chronic disease-CKD:   - Hb is at target     Hypertension:  Range elevated, volume and stress mediated. Improved nicely with dialysis     Plan/     New TDC did not work. CXR looks to be perfect position. Unclear if there could be a recurrent fibrin sheath but that if very fast to develop, perhaps inflammation kinking the catheter. Tried alteplase x 2 and neither port draws or flushes  He will need a new TDC. ?  If we have a bioflow catheter  Lokelma 10 g po TID in hopes to keep potassium down until we can get a catheter     -----------------------------  Jesus Mcneal M.D.   [facility]

## 2022-04-24 LAB
GLUCOSE BLD-MCNC: 150 MG/DL (ref 70–99)
GLUCOSE BLD-MCNC: 169 MG/DL (ref 70–99)
GLUCOSE BLD-MCNC: 194 MG/DL (ref 70–99)
GLUCOSE BLD-MCNC: 234 MG/DL (ref 70–99)
PERFORMED ON: ABNORMAL
PRO-BNP: ABNORMAL PG/ML (ref 0–124)

## 2022-04-24 PROCEDURE — 36415 COLL VENOUS BLD VENIPUNCTURE: CPT

## 2022-04-24 PROCEDURE — 2700000000 HC OXYGEN THERAPY PER DAY

## 2022-04-24 PROCEDURE — 6370000000 HC RX 637 (ALT 250 FOR IP): Performed by: HOSPITALIST

## 2022-04-24 PROCEDURE — 83880 ASSAY OF NATRIURETIC PEPTIDE: CPT

## 2022-04-24 PROCEDURE — 6370000000 HC RX 637 (ALT 250 FOR IP): Performed by: INTERNAL MEDICINE

## 2022-04-24 PROCEDURE — 94761 N-INVAS EAR/PLS OXIMETRY MLT: CPT

## 2022-04-24 PROCEDURE — 2580000003 HC RX 258: Performed by: INTERNAL MEDICINE

## 2022-04-24 PROCEDURE — 94660 CPAP INITIATION&MGMT: CPT

## 2022-04-24 PROCEDURE — 6360000002 HC RX W HCPCS: Performed by: INTERNAL MEDICINE

## 2022-04-24 PROCEDURE — 2060000000 HC ICU INTERMEDIATE R&B

## 2022-04-24 RX ORDER — FUROSEMIDE 10 MG/ML
100 INJECTION INTRAMUSCULAR; INTRAVENOUS ONCE
Status: DISCONTINUED | OUTPATIENT
Start: 2022-04-24 | End: 2022-04-27 | Stop reason: HOSPADM

## 2022-04-24 RX ORDER — ISOSORBIDE DINITRATE 10 MG/1
10 TABLET ORAL 3 TIMES DAILY
Status: DISCONTINUED | OUTPATIENT
Start: 2022-04-24 | End: 2022-04-27 | Stop reason: HOSPADM

## 2022-04-24 RX ADMIN — GABAPENTIN 100 MG: 100 CAPSULE ORAL at 08:58

## 2022-04-24 RX ADMIN — PRAVASTATIN SODIUM 40 MG: 40 TABLET ORAL at 08:58

## 2022-04-24 RX ADMIN — GABAPENTIN 100 MG: 100 CAPSULE ORAL at 15:18

## 2022-04-24 RX ADMIN — DULOXETINE HYDROCHLORIDE 30 MG: 30 CAPSULE, DELAYED RELEASE ORAL at 08:58

## 2022-04-24 RX ADMIN — GABAPENTIN 100 MG: 100 CAPSULE ORAL at 20:31

## 2022-04-24 RX ADMIN — OXYCODONE AND ACETAMINOPHEN 1 TABLET: 7.5; 325 TABLET ORAL at 15:18

## 2022-04-24 RX ADMIN — QUETIAPINE FUMARATE 25 MG: 25 TABLET ORAL at 22:59

## 2022-04-24 RX ADMIN — CLOPIDOGREL BISULFATE 75 MG: 75 TABLET, FILM COATED ORAL at 08:58

## 2022-04-24 RX ADMIN — METOPROLOL SUCCINATE 50 MG: 50 TABLET, EXTENDED RELEASE ORAL at 08:58

## 2022-04-24 RX ADMIN — PANTOPRAZOLE SODIUM 40 MG: 40 TABLET, DELAYED RELEASE ORAL at 09:03

## 2022-04-24 RX ADMIN — ISOSORBIDE DINITRATE 10 MG: 10 TABLET ORAL at 20:31

## 2022-04-24 RX ADMIN — SODIUM CHLORIDE, PRESERVATIVE FREE 10 ML: 5 INJECTION INTRAVENOUS at 09:10

## 2022-04-24 RX ADMIN — METOPROLOL SUCCINATE 50 MG: 50 TABLET, EXTENDED RELEASE ORAL at 20:31

## 2022-04-24 RX ADMIN — ISOSORBIDE DINITRATE 10 MG: 10 TABLET ORAL at 12:39

## 2022-04-24 RX ADMIN — OXYCODONE AND ACETAMINOPHEN 1 TABLET: 7.5; 325 TABLET ORAL at 09:03

## 2022-04-24 ASSESSMENT — PAIN DESCRIPTION - ORIENTATION
ORIENTATION: LOWER

## 2022-04-24 ASSESSMENT — PAIN DESCRIPTION - DESCRIPTORS
DESCRIPTORS: ACHING;DISCOMFORT

## 2022-04-24 ASSESSMENT — PAIN SCALES - GENERAL
PAINLEVEL_OUTOF10: 9
PAINLEVEL_OUTOF10: 10
PAINLEVEL_OUTOF10: 9
PAINLEVEL_OUTOF10: 9

## 2022-04-24 ASSESSMENT — PAIN DESCRIPTION - LOCATION
LOCATION: BACK

## 2022-04-24 NOTE — PROGRESS NOTES
Progress Note    HISTORY     CC:  Shortness of breath           We are following for ESRD on RRT       Subjective/   HPI:   Alteplase locked in both lumen of the dialysis line overnight. Attempted to draw from ports and unable to flush or draw. No flow at all and unable to do dialysis again. Feeling ok. Taking lokelma all weekend. Mild shortness of breath but manageable.       ROS:  Constitutional:  No fevers, No Chills, + weakness  Cardiovascular:  No palpations, no edema  Respiratory:  No wheezing, + SOB  Skin:  No rash, no itching  :  No hematuria, no dysuria     Social Hx:  No Family at the bedside     Past Medical and Surgical History:  - Reviewed, no changes     EXAM       Objective/     Vitals:    04/24/22 0526 04/24/22 0853 04/24/22 0854 04/24/22 1231   BP:   (!) 163/74 (!) 153/85   Pulse:  65 63 65   Resp:   18 18   Temp:   98.1 °F (36.7 °C) 97.8 °F (36.6 °C)   TempSrc:   Oral Oral   SpO2:  99% 99% 97%   Weight: 264 lb 5.3 oz (119.9 kg)      Height:         24HR INTAKE/OUTPUT:      Intake/Output Summary (Last 24 hours) at 4/24/2022 1845  Last data filed at 4/24/2022 1829  Gross per 24 hour   Intake 560 ml   Output 150 ml   Net 410 ml     Constitutional:  Ill appearing   Eyes:  Pupils reactive, sclera clear   Neck:  Normal thyroid, no masses   Cardiovascular:  Regular, no rub  Respiratory:  + distress, on bilevel   Psychiatry:  Flat mood/affect, somnolent but awakens to discuss treatment   Abdomen: +bs, soft, nt, no masses   Musculoskeletal: No LE edema, no clubbing   Lymphatics:  No LAD in neck, no supraclavicular nodes   Access:  [facility]      MEDICAL DECISION MAKING       Data/  Recent Labs     04/23/22  0729   WBC 8.8   HGB 9.3*   HCT 29.0*   MCV 92.0        Recent Labs     04/23/22  0729   *   K 5.5*   CL 94*   CO2 22   GLUCOSE 166*   PHOS 7.4*   BUN 77*   CREATININE 10.6*   LABGLOM 5*   GFRAA 6*       Assessment/     ESRD:  Due to diabetic nephropathy  - Schedule:  MWF at [facility].  Weight is down to 105. 5      Shortness of breath:  - Heart failure with poor fluid management on dialysis due to frequent missed treatments and high IDWG  - Improving oxygen requirements with UF  - Noted ABG last night      Hyponatremia:  Due to high IDWG     Anemia of chronic disease-CKD:   - Hb is at target     Hypertension:  Range elevated, volume and stress mediated. Improved nicely with dialysis     Plan/     New TDC did not work. CXR looks to be perfect position. Unclear if there could be a recurrent fibrin sheath but that if very fast to develop, perhaps inflammation kinking the catheter. Tried alteplase x 2 and neither port draws or flushes  He will need a new TDC. ? If we have a bioflow catheter, angiogram to rule out fibrin sheath, or try new side.   He is very concerned about pain during procedure, he says that he woke up during last time and it is  from the exchange   Lokelma 10 g po TID to keep potassium down until we can get a catheter   HD tomorrow after line placed     -----------------------------  Maryanne Gurrola M.D.   [facility]

## 2022-04-24 NOTE — PROGRESS NOTES
04/23/22 2327   NIV Type   Skin Assessment Clean, dry, & intact   Skin Protection for O2 Device Yes   Location Nose   Equipment Type V60   Mode Bilevel   Mask Type Full face mask   Mask Size Large   Settings/Measurements   IPAP 15 cmH20   CPAP/EPAP 6 cmH2O   Resp 14   FiO2  35 %   Vt Exhaled 840 mL   Minute Volume 12 Liters   Mask Leak (lpm) 37 lpm   Comfort Level Good   Using Accessory Muscles No   SpO2 99

## 2022-04-24 NOTE — FLOWSHEET NOTE
04/23/22 2213   Vital Signs   Temp 98 °F (36.7 °C)   Temp Source Oral   Pulse 62   Heart Rate Source Monitor   Resp 18   BP (!) 171/80   BP Location Right upper arm   MAP (Calculated) 110.33   Level of Consciousness Alert (0)   MEWS Score 1   Oxygen Therapy   SpO2 97 %   Pulse Oximeter Device Mode Intermittent   Pulse Oximeter Device Location Finger   O2 Device Nasal cannula     Pt A&O, NSR on tele, assessment complete as documented.

## 2022-04-24 NOTE — PROGRESS NOTES
Hospitalist Progress Note    Patient:  Janet Sharma  Unit/Bed:0433/0433-01   YOB: 1970       MRN: [id number] Acct: [de-identified]  PCP: Juli Booker MD    Date of Admission: 4/18/2022  --------------------------    Chief Complaint:     Shortness of breath    Hospital Course:     Janet Sharma is a 45 y.o. male hospitalized on 4/18/2022   with history of end-stage renal failure CHF, A. fib, hospitalized for recurrent shortness of breath and acute hypoxic respiratory failure secondary to nonadherence to HD. Assessment/plan:        Acute hypoxic hypercapnic respiratory failure secondary to volume overload/acute CHF from end-stage renal failure     Stable, continue oxygen and BiPAP. -     Acute on chronic CHF, undetermined type, EF of 45% secondary to nonadherence to hemodialysis    Awaiting replacement of HD catheter tomorrow  Continue monitor fluid status      Nonspecific troponin elevation in the setting of end-stage renal failure    Elevated procalcitonin, no clinical evidence to support pneumonia  Antibiotic discontinued    COPD without exacerbation  -Respiratory care protocol, oxygen supplementation as needed,      Aortic stenosis/bicuspid aortic valve status post TAVR    Coronary artery disease  Continue selected home medication occluding Plavix, beta-blocker and statin    Diabetes mellitus type 2 with multiple complication  Monitor blood sugar, insulin as required      Right fourth toe ulcer, continue wound care    End-stage renal failure/with malfunctioning dialysis catheter  Plan for exchange tomorrow    Mild hyperkalemia  Treated yesterday with medication      -Mild hyponatremia         Mood disorder, continue Cymbalta, Seroquel dose reduced    Dyslipidemia, CAD status post prior stents  Continue statin    afib Anticoagulation with Eliquis.     Code Status: Full Code         DVT prophylaxis: Eliquis     Disposition: Exchange catheter tomorrow followed by hemodialysis treatment and hopefully home with oxygen t        ----------------      Subjective:     Patient seen and examined  Has no complaint today  Reported nursing staff request to increase pain medication, appears comfortable without distress. Diet: ADULT ORAL NUTRITION SUPPLEMENT; AM Snack, PM Snack; Renal Oral Supplement  ADULT DIET; Regular; Low Potassium (Less than 3000 mg/day)    OBJECTIVE     Exam:  BP (!) 153/85   Pulse 65   Temp 97.8 °F (36.6 °C) (Oral)   Resp 18   Ht 5' 6\" (1.676 m)   Wt 264 lb 5.3 oz (119.9 kg)   SpO2 97%   BMI 42.66 kg/m²      Gen: Not in distress. Alert. Chronically ill  Head: Normocephalic. Atraumatic. Eyes: Conjunctivae/corneas clear. ENT: Oral mucosa moist  Neck: No JVD. No obvious thyromegaly. CVS: Nml S1S2, no murmur ,   Pulmomary: Fair air entry in both lungs, left upper chest HD cath  Gastrointestinal: Soft, non tender, non distend, . Musculoskeletal: No edema. Warm  Neuro: No focal deficit. Moves extremity spontaneously. Psychiatry: Appropriate affect. Not agitated.       Medications:  Reviewed    Infusion Medications    dextrose      sodium chloride       Scheduled Medications    furosemide  100 mg IntraVENous Once    isosorbide dinitrate  10 mg Oral TID    sodium zirconium cyclosilicate  10 g Oral TID    insulin lispro  0-3 Units SubCUTAneous Nightly    insulin lispro  0-6 Units SubCUTAneous TID WC    QUEtiapine  25 mg Oral Nightly    clopidogrel  75 mg Oral Daily    docusate sodium  100 mg Oral Daily    DULoxetine  30 mg Oral Daily    gabapentin  100 mg Oral TID    metoprolol succinate  50 mg Oral BID    pantoprazole  40 mg Oral QAM AC    pravastatin  40 mg Oral Daily    sodium chloride flush  5-40 mL IntraVENous 2 times per day     PRN Meds: alteplase, ipratropium-albuterol, oxyCODONE-acetaminophen, glucagon (rDNA), dextrose, dextrose bolus (hypoglycemia) **OR** dextrose bolus (hypoglycemia), sodium chloride flush, sodium chloride, ondansetron **OR** ondansetron, polyethylene glycol, acetaminophen **OR** acetaminophen      Intake/Output Summary (Last 24 hours) at 4/24/2022 1404  Last data filed at 4/24/2022 0350  Gross per 24 hour   Intake 800 ml   Output 150 ml   Net 650 ml             Labs:   Recent Labs     04/23/22  0729   WBC 8.8   HGB 9.3*   HCT 29.0*        Recent Labs     04/23/22  0729   *   K 5.5*   CL 94*   CO2 22   BUN 77*   CREATININE 10.6*   CALCIUM 9.4   PHOS 7.4*     No results for input(s): AST, ALT, BILIDIR, BILITOT, ALKPHOS in the last 72 hours. No results for input(s): INR in the last 72 hours. No results for input(s): [facility] [place] in the last 72 hours. Urinalysis:      Lab Results   Component Value Date    NITRU Negative 03/18/2022    WBCUA  03/18/2022    BACTERIA 1+ 03/18/2022    RBCUA 3-4 03/18/2022    BLOODU TRACE-INTACT 03/18/2022    SPECGRAV 1.020 03/18/2022    GLUCOSEU 100 03/18/2022       Radiology:  IR TUNNELED CVC PLACE WO SQ PORT/PUMP > 5 YEARS   Preliminary Result   Status post successful fluoroscopically guided replacement of left internal   jugular tunneled dialysis catheter as described above. XR CHEST PORTABLE   Final Result   Pulmonary vascular congestion/interstitial edema. Probable small bilateral   pleural effusions.          IR TUNNELED CVC PLACE WO SQ PORT/PUMP > 5 YEARS    (Results Pending)               Electronically signed by Ministerio Tejeda MD on 4/24/2022 at 2:04 PM

## 2022-04-24 NOTE — PROGRESS NOTES
04/24/22 0347   NIV Type   $NIV $Daily Charge   Skin Assessment Clean, dry, & intact   Skin Protection for O2 Device Yes   Location Nose   Equipment Type V60   Mode Bilevel   Mask Type Full face mask   Mask Size Large   Settings/Measurements   IPAP 15 cmH20   CPAP/EPAP 6 cmH2O   Resp 13   FiO2  35 %   Vt Exhaled 740 mL   Minute Volume 10 Liters   Mask Leak (lpm) 48 lpm   Comfort Level Good   Using Accessory Muscles No   SpO2 98

## 2022-04-25 ENCOUNTER — APPOINTMENT (OUTPATIENT)
Dept: GENERAL RADIOLOGY | Age: 52
DRG: 291 | End: 2022-04-25
Payer: COMMERCIAL

## 2022-04-25 LAB
GLUCOSE BLD-MCNC: 158 MG/DL (ref 70–99)
GLUCOSE BLD-MCNC: 165 MG/DL (ref 70–99)
GLUCOSE BLD-MCNC: 183 MG/DL (ref 70–99)
GLUCOSE BLD-MCNC: 193 MG/DL (ref 70–99)
INR BLD: 0.97 (ref 0.88–1.12)
PERFORMED ON: ABNORMAL
PROTHROMBIN TIME: 11 SEC (ref 9.9–12.7)

## 2022-04-25 PROCEDURE — 6370000000 HC RX 637 (ALT 250 FOR IP): Performed by: INTERNAL MEDICINE

## 2022-04-25 PROCEDURE — 6370000000 HC RX 637 (ALT 250 FOR IP): Performed by: HOSPITALIST

## 2022-04-25 PROCEDURE — 71045 X-RAY EXAM CHEST 1 VIEW: CPT

## 2022-04-25 PROCEDURE — 2060000000 HC ICU INTERMEDIATE R&B

## 2022-04-25 PROCEDURE — 94660 CPAP INITIATION&MGMT: CPT

## 2022-04-25 PROCEDURE — 2700000000 HC OXYGEN THERAPY PER DAY

## 2022-04-25 PROCEDURE — 94761 N-INVAS EAR/PLS OXIMETRY MLT: CPT

## 2022-04-25 PROCEDURE — 85610 PROTHROMBIN TIME: CPT

## 2022-04-25 PROCEDURE — 36415 COLL VENOUS BLD VENIPUNCTURE: CPT

## 2022-04-25 PROCEDURE — 2580000003 HC RX 258: Performed by: INTERNAL MEDICINE

## 2022-04-25 RX ADMIN — GABAPENTIN 100 MG: 100 CAPSULE ORAL at 08:49

## 2022-04-25 RX ADMIN — OXYCODONE AND ACETAMINOPHEN 1 TABLET: 7.5; 325 TABLET ORAL at 23:37

## 2022-04-25 RX ADMIN — OXYCODONE AND ACETAMINOPHEN 1 TABLET: 7.5; 325 TABLET ORAL at 08:48

## 2022-04-25 RX ADMIN — GABAPENTIN 100 MG: 100 CAPSULE ORAL at 16:08

## 2022-04-25 RX ADMIN — ISOSORBIDE DINITRATE 10 MG: 10 TABLET ORAL at 08:49

## 2022-04-25 RX ADMIN — INSULIN LISPRO 1 UNITS: 100 INJECTION, SOLUTION INTRAVENOUS; SUBCUTANEOUS at 08:51

## 2022-04-25 RX ADMIN — PRAVASTATIN SODIUM 40 MG: 40 TABLET ORAL at 08:48

## 2022-04-25 RX ADMIN — ISOSORBIDE DINITRATE 10 MG: 10 TABLET ORAL at 23:39

## 2022-04-25 RX ADMIN — METOPROLOL SUCCINATE 50 MG: 50 TABLET, EXTENDED RELEASE ORAL at 08:49

## 2022-04-25 RX ADMIN — ISOSORBIDE DINITRATE 10 MG: 10 TABLET ORAL at 16:07

## 2022-04-25 RX ADMIN — DULOXETINE HYDROCHLORIDE 30 MG: 30 CAPSULE, DELAYED RELEASE ORAL at 08:49

## 2022-04-25 RX ADMIN — SODIUM CHLORIDE, PRESERVATIVE FREE 10 ML: 5 INJECTION INTRAVENOUS at 23:40

## 2022-04-25 RX ADMIN — PANTOPRAZOLE SODIUM 40 MG: 40 TABLET, DELAYED RELEASE ORAL at 16:08

## 2022-04-25 RX ADMIN — GABAPENTIN 100 MG: 100 CAPSULE ORAL at 23:39

## 2022-04-25 RX ADMIN — QUETIAPINE FUMARATE 25 MG: 25 TABLET ORAL at 23:39

## 2022-04-25 RX ADMIN — INSULIN LISPRO 1 UNITS: 100 INJECTION, SOLUTION INTRAVENOUS; SUBCUTANEOUS at 17:59

## 2022-04-25 RX ADMIN — OXYCODONE AND ACETAMINOPHEN 1 TABLET: 7.5; 325 TABLET ORAL at 02:08

## 2022-04-25 ASSESSMENT — PAIN SCALES - GENERAL
PAINLEVEL_OUTOF10: 8
PAINLEVEL_OUTOF10: 10
PAINLEVEL_OUTOF10: 10
PAINLEVEL_OUTOF10: 7

## 2022-04-25 ASSESSMENT — PAIN DESCRIPTION - DESCRIPTORS
DESCRIPTORS: ACHING
DESCRIPTORS: ACHING;DISCOMFORT

## 2022-04-25 ASSESSMENT — PAIN DESCRIPTION - LOCATION
LOCATION: BACK

## 2022-04-25 ASSESSMENT — PAIN DESCRIPTION - ORIENTATION
ORIENTATION: LOWER
ORIENTATION: LOWER

## 2022-04-25 ASSESSMENT — PAIN DESCRIPTION - PAIN TYPE: TYPE: CHRONIC PAIN

## 2022-04-25 ASSESSMENT — PAIN DESCRIPTION - FREQUENCY: FREQUENCY: CONTINUOUS

## 2022-04-25 NOTE — PROGRESS NOTES
04/24/22 2333   NIV Type   Equipment Type v60   Mode Bilevel   Mask Type Full face mask   Mask Size Large   Settings/Measurements   IPAP 15 cmH20   CPAP/EPAP 6 cmH2O   Rate Ordered 10   Resp 17   FiO2  30 %   I Time/ I Time % 1 s   Vt Exhaled 889 mL   Minute Volume 11 Liters   Mask Leak (lpm) 30 lpm   Comfort Level Good   Using Accessory Muscles No   SpO2 95   Alarm Settings   Alarms On Y   Low Pressure 6 cmH2O   High Pressure  30 cmH2O   Apnea (secs) 20 secs   RR Low  6   RR High 40 br/min

## 2022-04-25 NOTE — PROGRESS NOTES
Received a notification to have midline placed on pt. Spoke with RN Krystian Whitten that pt would need nephrology approval to have line placed and that the pt had a IR placed CVC in March of 2022. There was some confusion on the process needed to place the midline weather consent was needed or not, this information was clarified. While in transit to the facility dispatch notified DIT RN that the order was being cancelled. Thank you for allowing us to collaborate with the team for care and services.

## 2022-04-25 NOTE — PROGRESS NOTES
Comprehensive Nutrition Assessment    Type and Reason for Visit:  Initial    Nutrition Recommendations/Plan:   1. Continue low potassium diet  2. Add low phosphorus modifier to diet order  3. Add Ensure High Protein BID - monitor need to adjust  4. Monitor po intakes, nutrition adequacy, weights, pertinent labs, BMs     Malnutrition Assessment:  Malnutrition Status: At risk for malnutrition (Comment) (04/25/22 7662)      Nutrition Assessment:    LOS assessment. Pt hospitalized for recurrent shortness of breath and acute hypoxic respiratory failure secondary to nonadherence to HD. Pt currently on a low potassium diet. Po intakes have been % per EMR. Pt reports that his appetite is okay, but he does not like the food offered at [facility]. Pt reports that his appetite has been good and weight Hx stable PTA. Pt provided with diet education earlier this admission and pt denied having any nutrition questions. Pt requesting ONS, will order. Will continue to monitor. Nutrition Related Findings:    -194 mg/dL. Trace BUE and BLE edema Wound Type: Surgical Incision       Current Nutrition Intake & Therapies:    Average Meal Intake: %  Average Supplements Intake: None Ordered  ADULT DIET; Regular; Low Potassium (Less than 3000 mg/day)  ADULT ORAL NUTRITION SUPPLEMENT; Breakfast, Dinner; Low Calorie/High Protein Oral Supplement    Anthropometric Measures:  Height: 5' 6\" (167.6 cm)  Ideal Body Weight (IBW): 142 lbs (65 kg)       Current Body Weight: 270 lb 4.5 oz (122.6 kg),   IBW.  Weight Source: Bed Scale  Current BMI (kg/m2): 43.6  BMI Categories: Obese Class 3 (BMI 40.0 or greater)    Nutrition Diagnosis:   No nutrition diagnosis at this time     Nutrition Interventions:   Food and/or Nutrient Delivery: Continue Current Diet,Start Oral Nutrition Supplement  Nutrition Education/Counseling: Education declined  Coordination of Nutrition Care: Continue to monitor while inpatient  Plan of Care discussed with: Patient    Goals:     Goals: Meet at least 75% of estimated needs,prior to discharge       Nutrition Monitoring and Evaluation:   Behavioral-Environmental Outcomes: None Identified  Food/Nutrient Intake Outcomes: Food and Nutrient Intake,Supplement Intake  Physical Signs/Symptoms Outcomes: Biochemical Data,Weight    Discharge Planning:    Continue current diet     Mela Ferrer RD, LD  Contact: [phone]

## 2022-04-25 NOTE — PROGRESS NOTES
Hospitalist Progress Note    Patient:  Tex Jara  Unit/Bed:0433/0433-01   YOB: 1970       MRN: [id number] Acct: [de-identified]  PCP: Natali Tripp MD    Date of Admission: 4/18/2022  --------------------------    Chief Complaint:     Shortness of breath    Hospital Course:     Tex Jara is a 45 y.o. male hospitalized on 4/18/2022   with history of end-stage renal failure CHF, A. fib, hospitalized for recurrent shortness of breath and acute hypoxic respiratory failure secondary to nonadherence to HD. Patient respiratory failure improved with hemodialysis. Malfunction of hemodialysis noted, patient will go will have hemodialysis catheter replacement tomorrow under sedation.     Assessment/plan:        Acute hypoxic hypercapnic respiratory failure secondary to volume overload/acute CHF from end-stage renal failure     Continue nightly BiPAP, continue home oxygen during the day      Acute on chronic CHF, undetermined type, EF of 45% secondary to nonadherence to hemodialysis    Hemodialysis catheter replacement under sedation tomorrow      Nonspecific troponin elevation in the setting of end-stage renal failure, no specific treatment     Elevated procalcitonin, no clinical evidence to support pneumonia  Antibiotic discontinued    COPD without exacerbation  -Respiratory care protocol, oxygen supplementation as needed,      Aortic stenosis/bicuspid aortic valve status post TAVR    Coronary artery disease  Continue selected home medication occluding Plavix, beta-blocker and statin    Diabetes mellitus type 2 with multiple complication  Monitor blood sugar, insulin as required      Right fourth toe ulcer, continue wound care    End-stage renal failure/with malfunctioning dialysis catheter  Plan for exchange with sedation tomorro     Mild hyperkalemia  Treated yesterday with medication  Lab per nephology     -Mild hyponatremia         Mood disorder, continue Cymbalta, Seroquel dose reduced    Dyslipidemia, CAD status post prior stents  Continue statin    afib Anticoagulation with Eliquis. Code Status: Full Code         DVT prophylaxis: Eliquis     Disposition:  awaiting HD cath placement         ----------------      Subjective:     Patient seen and examined  No complain   Awaiting HD cath placement     Diet: ADULT DIET; Regular; Low Potassium (Less than 3000 mg/day)  ADULT ORAL NUTRITION SUPPLEMENT; Breakfast, Dinner; Low Calorie/High Protein Oral Supplement    OBJECTIVE     Exam:  /76   Pulse 60   Temp 97.6 °F (36.4 °C) (Axillary)   Resp 16   Ht 5' 6\" (1.676 m)   Wt 270 lb 4.5 oz (122.6 kg)   SpO2 94%   BMI 43.62 kg/m²      No changes in his exam finding as detailed below     Gen: Not in distress. Alert. Chronically ill  Head: Normocephalic. Atraumatic. Eyes: Conjunctivae/corneas clear. ENT: Oral mucosa moist  Neck: No JVD. No obvious thyromegaly. CVS: Nml S1S2, no murmur ,   Pulmomary: Fair air entry in both lungs, left upper chest HD cath  Gastrointestinal: Soft, non tender, non distend, . Musculoskeletal: No edema. Warm  Neuro: No focal deficit. Moves extremity spontaneously. Psychiatry: Appropriate affect. Not agitated.       Medications:  Reviewed    Infusion Medications    dextrose      sodium chloride       Scheduled Medications    furosemide  100 mg IntraVENous Once    isosorbide dinitrate  10 mg Oral TID    sodium zirconium cyclosilicate  10 g Oral TID    insulin lispro  0-3 Units SubCUTAneous Nightly    insulin lispro  0-6 Units SubCUTAneous TID WC    QUEtiapine  25 mg Oral Nightly    clopidogrel  75 mg Oral Daily    docusate sodium  100 mg Oral Daily    DULoxetine  30 mg Oral Daily    gabapentin  100 mg Oral TID    metoprolol succinate  50 mg Oral BID    pantoprazole  40 mg Oral QAM AC    pravastatin  40 mg Oral Daily    sodium chloride flush  5-40 mL IntraVENous 2 times per day     PRN Meds: alteplase, ipratropium-albuterol, oxyCODONE-acetaminophen, glucagon (rDNA), dextrose, dextrose bolus (hypoglycemia) **OR** dextrose bolus (hypoglycemia), sodium chloride flush, sodium chloride, ondansetron **OR** ondansetron, polyethylene glycol, acetaminophen **OR** acetaminophen      Intake/Output Summary (Last 24 hours) at 4/25/2022 1325  Last data filed at 4/25/2022 1234  Gross per 24 hour   Intake 900 ml   Output 450 ml   Net 450 ml             Labs:   Recent Labs     04/23/22  0729   WBC 8.8   HGB 9.3*   HCT 29.0*        Recent Labs     04/23/22  0729   *   K 5.5*   CL 94*   CO2 22   BUN 77*   CREATININE 10.6*   CALCIUM 9.4   PHOS 7.4*     No results for input(s): AST, ALT, BILIDIR, BILITOT, ALKPHOS in the last 72 hours. Recent Labs     04/25/22  1250   INR 0.97     No results for input(s): [place] in the last 72 hours. Urinalysis:      Lab Results   Component Value Date    NITRU Negative 03/18/2022    WBCUA  03/18/2022    BACTERIA 1+ 03/18/2022    RBCUA 3-4 03/18/2022    BLOODU TRACE-INTACT 03/18/2022    SPECGRAV 1.020 03/18/2022    GLUCOSEU 100 03/18/2022       Radiology:  XR CHEST PORTABLE   Final Result   Successful placement of a central line via the left internal jugular   approach. The tip is likely in the right atrium. No obvious complication. Increased opacity over the left chest may indicate atelectasis and/or small   effusion. IR TUNNELED CVC PLACE WO SQ PORT/PUMP > 5 YEARS   Preliminary Result   Status post successful fluoroscopically guided replacement of left internal   jugular tunneled dialysis catheter as described above. XR CHEST PORTABLE   Final Result   Pulmonary vascular congestion/interstitial edema. Probable small bilateral   pleural effusions.          IR TUNNELED CVC PLACE WO SQ PORT/PUMP > 5 YEARS    (Results Pending)               Electronically signed by Bacilio Garzon MD on 4/25/2022 at 1:25 PM

## 2022-04-25 NOTE — PROGRESS NOTES
Per IR, vascath will be replaced tomorrow morning under sedation (per pt's wishes). Pt will be NPO from midnight.

## 2022-04-25 NOTE — PROGRESS NOTES
04/25/22 0348   NIV Type   Skin Protection for O2 Device No  (pt refused)   Equipment Type v60   Mode Bilevel   Mask Type Full face mask   Mask Size Large   Settings/Measurements   IPAP 15 cmH20   CPAP/EPAP 6 cmH2O   Rate Ordered 10   Resp 11   FiO2  30 %   I Time/ I Time % 1 s   Vt Exhaled 821 mL   Minute Volume 10.4 Liters   Mask Leak (lpm) 28 lpm   Comfort Level Good   Using Accessory Muscles No   SpO2 98   Alarm Settings   Alarms On Y   Low Pressure 6 cmH2O   High Pressure  30 cmH2O   Apnea (secs) 20 secs   RR Low  6   RR High 40 br/min   Oxygen Therapy/Pulse Ox   SpO2 98 %

## 2022-04-25 NOTE — PROGRESS NOTES
04/24/22 2044   NIV Type   Skin Protection for O2 Device No  (pt request held)   NIV Started/Stopped On   Equipment Type v60   Mode Bilevel   Mask Type Full face mask   Mask Size Large   Settings/Measurements   IPAP 15 cmH20   CPAP/EPAP 6 cmH2O   Rate Ordered 10   Resp 11   FiO2  35 %  (decfreased to 30%)   I Time/ I Time % 1 s   Vt Exhaled 730 mL   Minute Volume 8.6 Liters   Mask Leak (lpm) 22 lpm   Comfort Level Good   Using Accessory Muscles No   SpO2 100   Alarm Settings   Alarms On Y   Low Pressure 6 cmH2O   High Pressure  30 cmH2O   Apnea (secs) 20 secs   RR Low  6   RR High 40 br/min   Oxygen Therapy/Pulse Ox   SpO2 99 %

## 2022-04-25 NOTE — PROGRESS NOTES
Unable to obtain IV access after multiple attempts. On call NP informed. Order obtained to contact [facility] to attempt IV access.

## 2022-04-25 NOTE — PROGRESS NOTES
Pt has no IV access. Pt has ESRD on hemodialysis MWF. No BP/IV sticks to LUE d/t nonfunctioning AV fistula. Has left IJ tunneled vasc cath that is scheduled for [facility] exchanged later this am.  Pt with limited vein access and multiple failed attempts of IV insertion. Placed a call from nephrology for approval to have picc team place midline picc. Dr. Gregory Obrien returned a call and states pt cannot have midline picc placed. Called picc team and canceled midline insertion per Dr. Gregory Obrien.

## 2022-04-25 NOTE — PROGRESS NOTES
04/25/22 0025   NIV Type   $NIV $Daily Charge   Equipment Type v60   Mode Bilevel   Mask Type Full face mask   Mask Size Large   Settings/Measurements   IPAP 15 cmH20   CPAP/EPAP 6 cmH2O   Rate Ordered 10   Resp 15   FiO2  30 %   I Time/ I Time % 1 s   Vt Exhaled 751 mL   Minute Volume 10.4 Liters   Mask Leak (lpm) 48 lpm   Comfort Level Good   Using Accessory Muscles No   SpO2 94   Alarm Settings   Alarms On Y   Low Pressure 6 cmH2O   High Pressure  30 cmH2O   Apnea (secs) 20 secs   RR Low  6   RR High 40 br/min

## 2022-04-25 NOTE — PROGRESS NOTES
04/25/22 0529   RT Protocol   History Pulmonary Disease 1   Respiratory pattern 0   Breath sounds 2   Cough 0   Bronchodilator Assessment Score 3

## 2022-04-25 NOTE — PROGRESS NOTES
Progress Note    HISTORY     CC:  Shortness of breath           We are following for ESRD on RRT       Subjective/   HPI:   Alteplase locked in both lumen of the dialysis line overnight. Attempted to draw from ports and unable to flush or draw. No flow at all and unable to do dialysis again. Feeling ok. Taking lokelma all weekend. Mild shortness of breath but manageable.       ROS:  Constitutional:  No fevers, No Chills, + weakness  Cardiovascular:  No palpations, no edema  Respiratory:  No wheezing, + SOB  Skin:  No rash, no itching  :  No hematuria, no dysuria     Social Hx:  No Family at the bedside     Past Medical and Surgical History:  - Reviewed, no changes     EXAM       Objective/     Vitals:    04/25/22 0425 04/25/22 0428 04/25/22 0515 04/25/22 0830   BP: 131/66   (!) 195/104   Pulse: 59   68   Resp: 12   16   Temp: 98.1 °F (36.7 °C)   97.4 °F (36.3 °C)   TempSrc: Axillary   Axillary   SpO2: 96%  95% 98%   Weight:  270 lb 4.5 oz (122.6 kg)     Height:         24HR INTAKE/OUTPUT:      Intake/Output Summary (Last 24 hours) at 4/25/2022 1052  Last data filed at 4/25/2022 0387  Gross per 24 hour   Intake 540 ml   Output 450 ml   Net 90 ml     Constitutional:  Ill appearing   Eyes:  Pupils reactive, sclera clear   Neck:  Normal thyroid, no masses   Cardiovascular:  Regular, no rub  Respiratory:  + distress, on bilevel   Psychiatry:  Flat mood/affect, somnolent but awakens to discuss treatment   Abdomen: +bs, soft, nt, no masses   Musculoskeletal: No LE edema, no clubbing   Lymphatics:  No LAD in neck, no supraclavicular nodes   Access:  [facility]      MEDICAL DECISION MAKING       Data/  Recent Labs     04/23/22  0729   WBC 8.8   HGB 9.3*   HCT 29.0*   MCV 92.0        Recent Labs     04/23/22  0729   *   K 5.5*   CL 94*   CO2 22   GLUCOSE 166*   PHOS 7.4*   BUN 77*   CREATININE 10.6*   LABGLOM 5*   GFRAA 6*       Assessment/     ESRD:  Due to diabetic nephropathy  - Schedule:  MWF at [facility]. Weight is down to 105. 5      Shortness of breath:  - Heart failure with poor fluid management on dialysis due to frequent missed treatments and high IDWG  - Improving oxygen requirements with UF  - Noted ABG last night      Hyponatremia:  Due to high IDWG     Anemia of chronic disease-CKD:   - Hb is at target     Hypertension:  Range elevated, volume and stress mediated. Improved nicely with dialysis     Plan/     New TDC did not work. CXR looks to be perfect position. Unclear if there could be a recurrent fibrin sheath but that if very fast to develop, perhaps inflammation kinking the catheter. Tried alteplase x 2 and neither port draws or flushes  He will need a new TDC. ? If we have a bioflow catheter, angiogram to rule out fibrin sheath, or try new side.   He is very concerned about pain during procedure, he says that he woke up during last time and it is  from the exchange   Lokelma 10 g po TID to keep potassium down until we can get a catheter   HD after line placed

## 2022-04-25 NOTE — CARE COORDINATION
Chart reviewed, pt discussed during huddle with primary RN, separately with Dr Celine Ford. Pt to [facility] pending IR review, possible discharge today if placed and HD done. Plan for home with [facility] HD resumed, and new continuous home O2 via [employer].   REINALDO Gutierrez-RN

## 2022-04-25 NOTE — PROGRESS NOTES
Jed Sawyer CNP made aware has no PIV and nephrology states pt cannot have midline picc. Ok pt with no IV access.

## 2022-04-26 ENCOUNTER — APPOINTMENT (OUTPATIENT)
Dept: INTERVENTIONAL RADIOLOGY/VASCULAR | Age: 52
DRG: 291 | End: 2022-04-26
Payer: COMMERCIAL

## 2022-04-26 PROBLEM — E66.01 MORBID OBESITY WITH BMI OF 40.0-44.9, ADULT (HCC): Status: ACTIVE | Noted: 2022-04-26

## 2022-04-26 LAB
ALBUMIN SERPL-MCNC: 3.6 G/DL (ref 3.4–5)
ANION GAP SERPL CALCULATED.3IONS-SCNC: 19 MMOL/L (ref 3–16)
BASOPHILS ABSOLUTE: 0 K/UL (ref 0–0.2)
BASOPHILS RELATIVE PERCENT: 0.2 %
BUN BLDV-MCNC: 112 MG/DL (ref 7–20)
CALCIUM SERPL-MCNC: 9.4 MG/DL (ref 8.3–10.6)
CHLORIDE BLD-SCNC: 91 MMOL/L (ref 99–110)
CO2: 18 MMOL/L (ref 21–32)
CREAT SERPL-MCNC: 13.1 MG/DL (ref 0.9–1.3)
EOSINOPHILS ABSOLUTE: 0.3 K/UL (ref 0–0.6)
EOSINOPHILS RELATIVE PERCENT: 3.6 %
GFR AFRICAN AMERICAN: 5
GFR NON-AFRICAN AMERICAN: 4
GLUCOSE BLD-MCNC: 142 MG/DL (ref 70–99)
GLUCOSE BLD-MCNC: 149 MG/DL (ref 70–99)
GLUCOSE BLD-MCNC: 159 MG/DL (ref 70–99)
GLUCOSE BLD-MCNC: 162 MG/DL (ref 70–99)
GLUCOSE BLD-MCNC: 211 MG/DL (ref 70–99)
GLUCOSE BLD-MCNC: 289 MG/DL (ref 70–99)
HCT VFR BLD CALC: 24.1 % (ref 40.5–52.5)
HEMOGLOBIN: 7.9 G/DL (ref 13.5–17.5)
LYMPHOCYTES ABSOLUTE: 1 K/UL (ref 1–5.1)
LYMPHOCYTES RELATIVE PERCENT: 10.8 %
MCH RBC QN AUTO: 30.1 PG (ref 26–34)
MCHC RBC AUTO-ENTMCNC: 32.6 G/DL (ref 31–36)
MCV RBC AUTO: 92.3 FL (ref 80–100)
MONOCYTES ABSOLUTE: 0.7 K/UL (ref 0–1.3)
MONOCYTES RELATIVE PERCENT: 7.2 %
NEUTROPHILS ABSOLUTE: 7.1 K/UL (ref 1.7–7.7)
NEUTROPHILS RELATIVE PERCENT: 78.2 %
PDW BLD-RTO: 16.2 % (ref 12.4–15.4)
PERFORMED ON: ABNORMAL
PHOSPHORUS: 10.3 MG/DL (ref 2.5–4.9)
PLATELET # BLD: 221 K/UL (ref 135–450)
PMV BLD AUTO: 8.2 FL (ref 5–10.5)
POTASSIUM SERPL-SCNC: 6.2 MMOL/L (ref 3.5–5.1)
RBC # BLD: 2.61 M/UL (ref 4.2–5.9)
SODIUM BLD-SCNC: 128 MMOL/L (ref 136–145)
WBC # BLD: 9.1 K/UL (ref 4–11)

## 2022-04-26 PROCEDURE — 80069 RENAL FUNCTION PANEL: CPT

## 2022-04-26 PROCEDURE — 6360000002 HC RX W HCPCS: Performed by: RADIOLOGY

## 2022-04-26 PROCEDURE — 77001 FLUOROGUIDE FOR VEIN DEVICE: CPT

## 2022-04-26 PROCEDURE — 02PY33Z REMOVAL OF INFUSION DEVICE FROM GREAT VESSEL, PERCUTANEOUS APPROACH: ICD-10-PCS | Performed by: INTERNAL MEDICINE

## 2022-04-26 PROCEDURE — 0JH63XZ INSERTION OF TUNNELED VASCULAR ACCESS DEVICE INTO CHEST SUBCUTANEOUS TISSUE AND FASCIA, PERCUTANEOUS APPROACH: ICD-10-PCS | Performed by: INTERNAL MEDICINE

## 2022-04-26 PROCEDURE — 02HV33Z INSERTION OF INFUSION DEVICE INTO SUPERIOR VENA CAVA, PERCUTANEOUS APPROACH: ICD-10-PCS | Performed by: INTERNAL MEDICINE

## 2022-04-26 PROCEDURE — 94761 N-INVAS EAR/PLS OXIMETRY MLT: CPT

## 2022-04-26 PROCEDURE — 2060000000 HC ICU INTERMEDIATE R&B

## 2022-04-26 PROCEDURE — 2700000000 HC OXYGEN THERAPY PER DAY

## 2022-04-26 PROCEDURE — 99152 MOD SED SAME PHYS/QHP 5/>YRS: CPT

## 2022-04-26 PROCEDURE — 94660 CPAP INITIATION&MGMT: CPT

## 2022-04-26 PROCEDURE — 36581 REPLACE TUNNELED CV CATH: CPT

## 2022-04-26 PROCEDURE — 6370000000 HC RX 637 (ALT 250 FOR IP): Performed by: HOSPITALIST

## 2022-04-26 PROCEDURE — 6360000002 HC RX W HCPCS

## 2022-04-26 PROCEDURE — 2580000003 HC RX 258: Performed by: RADIOLOGY

## 2022-04-26 PROCEDURE — 2580000003 HC RX 258: Performed by: INTERNAL MEDICINE

## 2022-04-26 PROCEDURE — 85025 COMPLETE CBC W/AUTO DIFF WBC: CPT

## 2022-04-26 PROCEDURE — 90935 HEMODIALYSIS ONE EVALUATION: CPT

## 2022-04-26 PROCEDURE — 6370000000 HC RX 637 (ALT 250 FOR IP): Performed by: INTERNAL MEDICINE

## 2022-04-26 PROCEDURE — 0JPV3XZ REMOVAL OF TUNNELED VASCULAR ACCESS DEVICE FROM UPPER EXTREMITY SUBCUTANEOUS TISSUE AND FASCIA, PERCUTANEOUS APPROACH: ICD-10-PCS | Performed by: INTERNAL MEDICINE

## 2022-04-26 PROCEDURE — C1881 DIALYSIS ACCESS SYSTEM: HCPCS

## 2022-04-26 RX ORDER — QUETIAPINE FUMARATE 25 MG/1
25 TABLET, FILM COATED ORAL NIGHTLY
Qty: 60 TABLET | Refills: 3 | Status: CANCELLED | OUTPATIENT
Start: 2022-04-26

## 2022-04-26 RX ORDER — ISOSORBIDE DINITRATE 10 MG/1
10 TABLET ORAL 3 TIMES DAILY
Qty: 90 TABLET | Refills: 3 | Status: CANCELLED | OUTPATIENT
Start: 2022-04-26

## 2022-04-26 RX ORDER — HEPARIN SODIUM 1000 [USP'U]/ML
4200 INJECTION, SOLUTION INTRAVENOUS; SUBCUTANEOUS PRN
Status: DISCONTINUED | OUTPATIENT
Start: 2022-04-26 | End: 2022-04-27 | Stop reason: HOSPADM

## 2022-04-26 RX ORDER — ALBUMIN (HUMAN) 12.5 G/50ML
25 SOLUTION INTRAVENOUS PRN
Status: DISCONTINUED | OUTPATIENT
Start: 2022-04-26 | End: 2022-04-27 | Stop reason: HOSPADM

## 2022-04-26 RX ORDER — HEPARIN SODIUM 1000 [USP'U]/ML
INJECTION, SOLUTION INTRAVENOUS; SUBCUTANEOUS
Status: DISPENSED
Start: 2022-04-26 | End: 2022-04-27

## 2022-04-26 RX ORDER — MIDAZOLAM HYDROCHLORIDE 1 MG/ML
INJECTION INTRAMUSCULAR; INTRAVENOUS
Status: COMPLETED | OUTPATIENT
Start: 2022-04-26 | End: 2022-04-26

## 2022-04-26 RX ORDER — DOCUSATE SODIUM 100 MG/1
100 CAPSULE, LIQUID FILLED ORAL DAILY
Qty: 30 CAPSULE | Refills: 5 | Status: CANCELLED | OUTPATIENT
Start: 2022-04-26

## 2022-04-26 RX ORDER — FENTANYL CITRATE 50 UG/ML
INJECTION, SOLUTION INTRAMUSCULAR; INTRAVENOUS
Status: COMPLETED | OUTPATIENT
Start: 2022-04-26 | End: 2022-04-26

## 2022-04-26 RX ADMIN — METOPROLOL SUCCINATE 50 MG: 50 TABLET, EXTENDED RELEASE ORAL at 20:25

## 2022-04-26 RX ADMIN — INSULIN LISPRO 1 UNITS: 100 INJECTION, SOLUTION INTRAVENOUS; SUBCUTANEOUS at 17:03

## 2022-04-26 RX ADMIN — INSULIN LISPRO 2 UNITS: 100 INJECTION, SOLUTION INTRAVENOUS; SUBCUTANEOUS at 20:26

## 2022-04-26 RX ADMIN — ISOSORBIDE DINITRATE 10 MG: 10 TABLET ORAL at 20:25

## 2022-04-26 RX ADMIN — MIDAZOLAM HYDROCHLORIDE 1 MG: 2 INJECTION, SOLUTION INTRAMUSCULAR; INTRAVENOUS at 09:43

## 2022-04-26 RX ADMIN — FENTANYL CITRATE 50 MCG: 50 INJECTION INTRAMUSCULAR; INTRAVENOUS at 09:43

## 2022-04-26 RX ADMIN — CEFAZOLIN 2000 MG: 1 INJECTION, POWDER, FOR SOLUTION INTRAMUSCULAR; INTRAVENOUS at 09:35

## 2022-04-26 RX ADMIN — OXYCODONE AND ACETAMINOPHEN 1 TABLET: 7.5; 325 TABLET ORAL at 06:34

## 2022-04-26 RX ADMIN — SODIUM CHLORIDE, PRESERVATIVE FREE 10 ML: 5 INJECTION INTRAVENOUS at 20:25

## 2022-04-26 RX ADMIN — CEFAZOLIN SODIUM: 10 INJECTION, POWDER, FOR SOLUTION INTRAVENOUS at 08:53

## 2022-04-26 RX ADMIN — PANTOPRAZOLE SODIUM 40 MG: 40 TABLET, DELAYED RELEASE ORAL at 06:35

## 2022-04-26 RX ADMIN — GABAPENTIN 100 MG: 100 CAPSULE ORAL at 20:25

## 2022-04-26 RX ADMIN — QUETIAPINE FUMARATE 25 MG: 25 TABLET ORAL at 20:25

## 2022-04-26 ASSESSMENT — PAIN DESCRIPTION - LOCATION: LOCATION: BACK

## 2022-04-26 ASSESSMENT — PAIN SCALES - GENERAL
PAINLEVEL_OUTOF10: 10
PAINLEVEL_OUTOF10: 10
PAINLEVEL_OUTOF10: 0
PAINLEVEL_OUTOF10: 10
PAINLEVEL_OUTOF10: 0

## 2022-04-26 NOTE — PROGRESS NOTES
Progress Note    HISTORY     CC:  Shortness of breath           We are following for ESRD on RRT       Subjective/   HPI:   New TDC in IR today. Working well in HD    ROS:  Constitutional:  No fevers, No Chills, + weakness  Cardiovascular:  No palpations, no edema  Respiratory:  No wheezing, + SOB  Skin:  No rash, no itching  :  No hematuria, no dysuria     Social Hx:  No Family at the bedside     Past Medical and Surgical History:  - Reviewed, no changes     EXAM       Objective/     Vitals:    04/26/22 0951 04/26/22 0956 04/26/22 1000 04/26/22 1001   BP: 123/72 113/74 120/70 124/69   Pulse: 51 (!) 48 (!) 47 (!) 48   Resp: 16 13 11 16   Temp:       TempSrc:       SpO2: 100% 100% 100% 100%   Weight:       Height:         24HR INTAKE/OUTPUT:      Intake/Output Summary (Last 24 hours) at 4/26/2022 1109  Last data filed at 4/25/2022 2345  Gross per 24 hour   Intake 1210 ml   Output 150 ml   Net 1060 ml     Constitutional:  Ill appearing   Eyes:  Pupils reactive, sclera clear   Neck:  Normal thyroid, no masses   Cardiovascular:  Regular, no rub  Respiratory:  + distress, on bilevel   Psychiatry:  Flat mood/affect, somnolent but awakens to discuss treatment   Abdomen: +bs, soft, nt, no masses   Musculoskeletal: No LE edema, no clubbing   Lymphatics:  No LAD in neck, no supraclavicular nodes   Access:  [facility]      MEDICAL DECISION MAKING       Data/  No results for input(s): WBC, HGB, HCT, MCV, PLT in the last 72 hours. No results for input(s): NA, K, CL, CO2, GLUCOSE, PHOS, MG, BUN, CREATININE, CA, LABGLOM, GFRAA in the last 72 hours. Assessment/Plan     #ESRD:  Due to diabetic nephropathy  - Schedule:  MWF at [facility].     NW=645.4  -[facility] 4/26/22  Seen during HD today     #Hyperkalemia- did not have HD since last week    #Anemia of chronic disease-CKD:   - Hb is at target     #Hypertension:    Improved  with dialysis

## 2022-04-26 NOTE — FLOWSHEET NOTE
04/26/22 1158 04/26/22 1532   Vital Signs   /60 (!) 150/85   Temp 96.3 °F (35.7 °C) 96.5 °F (35.8 °C)   Pulse (!) 48 67   Resp 16 16   Weight 262 lb 9.1 oz (119.1 kg) 255 lb 15.3 oz (116.1 kg)   Weight Method Bed scale Bed scale     Treatment time: 3.5 hours  Net UF: 3011 ml        Summary of response to treatment: tolerated tx well, no distress noted, MD notified     Copy of dialysis treatment record placed in chart, to be scanned into EMR.

## 2022-04-26 NOTE — PROGRESS NOTES
RADIOLOGY:  Patient status post fluoroscopically guided replacement of left internal jugular tunneled dialysis catheter. Existing catheter replaced with longer/28 cm catheter. Patient tolerated the procedure well. Full report to follow.

## 2022-04-26 NOTE — OR NURSING
Time out completed prior to procedure. Pt was place supine on the IR table. Pt was placed on all cardiac monitoring.  Pt arrived on 1 L NC.

## 2022-04-26 NOTE — OR NURSING
Image guided tunneled dialysis catheter exchange left IJ completed. Dr. Neal Soliz placed 14.5 Czech 28 cm Palindrome chronic tunneled dialysis catheter LOT # [id number] EXP 6/19/2024 in the left IJ. Drain/tube secured with sutures. Drain/tube dressing clean, dry, and intact. Pt tolerated procedure without any signs or symptoms of distress. Vital signs stable. Report called to  RN. Pt transported back to [facility] in stable condition via bed by transport.      2.1cc heparin in each lumen    Medications  Versed: 1 mg  Fentanyl: 50 mcg  Ancef: 2G    Vital Signs  Vitals:    04/26/22 0956   BP: 113/74   Pulse: (!) 48   Resp: 13   Temp:    SpO2: 100%    (vital signs in table format)    Post Ernesto  2 - Able to move 4 extremities voluntarily on command  2 - BP+/- 20mmHg of normal  2 - Fully awake  1 - Needs oxygen to maintain oxygen saturation >90%  2 - Able to breathe deeply and cough freely

## 2022-04-26 NOTE — CARE COORDINATION
Patient got [facility] placement today and is currently off floor getting HD. He will need walk test repeated to get home O2 arranged and home care orders with signed [id number] Plan is for [facility] and new oxygen with [employer] and resume his outpt HD at [facility] MWF 1130. Will likely need ambulance transport home.

## 2022-04-26 NOTE — PROGRESS NOTES
04/25/22 2350   NIV Type   NIV Started/Stopped On   Equipment Type v60   Mode Bilevel   Mask Type Full face mask   Mask Size Large   Settings/Measurements   IPAP 15 cmH20   CPAP/EPAP 5 cmH2O   Rate Ordered 10   Resp 20   FiO2  30 %   Vt Exhaled 901 mL   Mask Leak (lpm) 20 lpm   Comfort Level Good   Using Accessory Muscles No   Alarm Settings   Alarms On Y   Low Pressure 6 cmH2O   High Pressure  30 cmH2O

## 2022-04-26 NOTE — PROGRESS NOTES
Hospitalist Progress Note    CC: Acute hypoxemic respiratory failure [facility])    Hospital course:  45 y.o. male hospitalized on 4/18/2022   with history of end-stage renal failure CHF, A. fib, hospitalized for recurrent shortness of breath and acute hypoxic respiratory failure secondary to nonadherence to HD. Patient respiratory failure improved with hemodialysis. Malfunction of hemodialysis noted, patient had replacement of HD catheter today. Admit date: 4/18/2022  Days in hospital:  8    24 Hour Events: he was sleepy on oxygen after placement of HD catheter    Subjective: ill appearing - currently down to 2L oxygen - can likely discharge in AM - had HD today    ROS:   A comprehensive review of systems was negative except for: sleepy, but does wake up and is agreeable for home tomorrow - denies SOB . Objective:    BP (!) 166/73   Pulse 70   Temp 97.8 °F (36.6 °C) (Oral)   Resp 16   Ht 5' 6\" (1.676 m)   Wt 255 lb 15.3 oz (116.1 kg)   SpO2 100%   BMI 41.31 kg/m²     Gen: obese, ill appearing  HEENT: NC/AT, moist mucous membranes, no oropharyngeal erythema or exudate  Neck: supple, trachea midline, no anterior cervical or SC LAD  Heart:  Normal s1/s2, RRR, no murmurs, gallops, or rubs. 1+ leg edema  Lungs:  diminished bilaterally, no wheeze, no rales, no rhonchi, no crackles, no use of accessory muscles  Abd: bowel sounds present, soft, nontender, nondistended, no masses  Extrem:  No clubbing, cyanosis,  1+ edema  Skin: no rashes or lesions  Psych: A & O x3  Neuro: grossly intact, moves all four extremities.       Assessment:    Principal Problem:    Acute hypoxemic respiratory failure (HCC)  Active Problems:    S/P TAVR (transcatheter aortic valve replacement)    Former smoker    Cardiomyopathy ([facility] [address]    Morbid obesity with BMI of 40.0-44.9, adult ([facility] [address]    ESRD on dialysis ([facility] [address]    Acute diastolic CHF (congestive heart failure) (HCC)    Acute pulmonary edema (HCC)    Grade II diastolic dysfunction  Resolved Problems:    * No resolved hospital problems. *      Plan:  1. Acute resp failure with hypoxia - down to 2L - will discharge home with oxygen  2. ESRD on HD  Had catheter replaced and had HD today  3. Acute diastolic CHF - removed 3L fluid - pt tolerated well  4. Hyperkalemia - will likely run on a low K+ bath  5. Noncompliance - this may be an issue - will discharge in AM    Prognosis:  Fair    Code status:  Full code    DVT prophylaxis: Patient Ambulatory  GI prophylaxis: Proton Pump Inhibitor  Antibiotic prophylaxis indicated:   no  Diet:  ADULT DIET; Regular; Low Potassium (Less than 3000 mg/day)  ADULT ORAL NUTRITION SUPPLEMENT; Breakfast, Dinner;  Low Calorie/High Protein Oral Supplement    Disposition:  Home with home health    Medications:  Scheduled Meds:   heparin (porcine)        furosemide  100 mg IntraVENous Once    isosorbide dinitrate  10 mg Oral TID    insulin lispro  0-3 Units SubCUTAneous Nightly    insulin lispro  0-6 Units SubCUTAneous TID WC    QUEtiapine  25 mg Oral Nightly    clopidogrel  75 mg Oral Daily    docusate sodium  100 mg Oral Daily    DULoxetine  30 mg Oral Daily    gabapentin  100 mg Oral TID    metoprolol succinate  50 mg Oral BID    pantoprazole  40 mg Oral QAM AC    pravastatin  40 mg Oral Daily    sodium chloride flush  5-40 mL IntraVENous 2 times per day       PRN Meds:  albumin human, heparin (porcine), alteplase, ipratropium-albuterol, oxyCODONE-acetaminophen, glucagon (rDNA), dextrose, dextrose bolus (hypoglycemia) **OR** dextrose bolus (hypoglycemia), sodium chloride flush, sodium chloride, ondansetron **OR** ondansetron, polyethylene glycol, acetaminophen **OR** acetaminophen    IV:   dextrose      sodium chloride           Intake/Output Summary (Last 24 hours) at 4/26/2022 1718  Last data filed at 4/25/2022 2345  Gross per 24 hour   Intake 730 ml   Output 150 ml   Net 580 ml       Results:  CBC: Recent Labs     04/26/22  1156   WBC 9.1   HGB 7.9*   HCT 24.1*   MCV 92.3        BMP:   Recent Labs     04/26/22  1156   *   K 6.2*   CL 91*   CO2 18*   PHOS 10.3*   *   CREATININE 13.1*     Mag: No results for input(s): MAG in the last 72 hours. Phos:   Lab Results   Component Value Date    PHOS 10.3 (H) 04/26/2022     No results found for: GLU    LIVER PROFILE: No results for input(s): AST, ALT, LIPASE, BILIDIR, BILITOT, ALKPHOS in the last 72 hours. Invalid input(s): AMYLASE,  ALB  PT/INR:   Recent Labs     04/25/22  1250   PROTIME 11.0   INR 0.97     APTT: No results for input(s): APTT in the last 72 hours. UA:No results for input(s): NITRITE, COLORU, PHUR, LABCAST, WBCUA, RBCUA, MUCUS, TRICHOMONAS, YEAST, BACTERIA, CLARITYU, SPECGRAV, LEUKOCYTESUR, UROBILINOGEN, BILIRUBINUR, BLOODU, GLUCOSEU, AMORPHOUS in the last 72 hours.     Invalid input(s): Herber Coulter input(s): ABG  Lab Results   Component Value Date    CALCIUM 9.4 04/26/2022    PHOS 10.3 (H) 04/26/2022               Electronically signed by Ernesto Anderson MD on 4/26/2022 at 5:18 PM

## 2022-04-26 NOTE — OR NURSING
Pt arrived for image guided tunneled dialysis catheter exchange left IJ . Procedure explained including the risk and benefits of the procedure. All questions answered. Pt verbalizes understanding of the procedure and states no more questions. Consent confirmed. Vital signs stable. Labs, allergies, medications, and code status reviewed. No contraindications noted.      Vital Signs  Vitals:    04/26/22 0934   BP: 136/81   Pulse: 52   Resp: 22   Temp:    SpO2: 100%    (vital signs in table format)    Pre Ernesto Score  2 - Able to move 4 extremities voluntarily on command  2 - BP+/- 20mmHg of normal  2 - Fully awake  1 - Needs oxygen to maintain oxygen saturation >90%  2 - Able to breathe deeply and cough freely    Allergies  Morphine (allergies)    Labs  Lab Results   Component Value Date    INR 0.97 04/25/2022    PROTIME 11.0 04/25/2022     Lab Results   Component Value Date    CREATININE 10.6 (HH) 04/23/2022    BUN 77 (H) 04/23/2022     (L) 04/23/2022    GFR >60 05/17/2013    K 5.5 (H) 04/23/2022    CL 94 (L) 04/23/2022    CO2 22 04/23/2022     Lab Results   Component Value Date    WBC 8.8 04/23/2022    HGB 9.3 (L) 04/23/2022    HCT 29.0 (L) 04/23/2022    MCV 92.0 04/23/2022     04/23/2022

## 2022-04-26 NOTE — PROGRESS NOTES
04/26/22 0329   NIV Type   $NIV $Daily Charge   NIV Started/Stopped On   Equipment Type v60   Mode Bilevel   Mask Type Full face mask   Mask Size Large   Settings/Measurements   IPAP 15 cmH20   CPAP/EPAP 5 cmH2O   Rate Ordered 10   Resp 18   FiO2  30 %   Vt Exhaled 851 mL   Mask Leak (lpm) 12 lpm   Comfort Level Good   Using Accessory Muscles No   Alarm Settings   Alarms On Y   Low Pressure 6 cmH2O   High Pressure  30 cmH2O   RR Low  6   RR High 40 br/min

## 2022-04-26 NOTE — PROGRESS NOTES
Patient's EF (Ejection Fraction) is greater than 40%    Heart Failure Medications:   Diuretics[de-identified] Furosemide, Torsemide, Spironolactone, Metalozone, Other and None     (One of the following REQUIRED for EF </= 40%/SYSTOLIC FAILURE but MAY be used in EF% >40%/DIASTOLIC FAILURE)        ACE[de-identified] None        ARB[de-identified] None         ARNI[de-identified] None    (Beta Blockers)   NON- Evidenced Based Beta Blocker (for EF% >40%/DIASTOLIC FAILURE): None     Evidenced Based Beta Blocker::(REQUIRED for EF% <40%/SYSTOLIC FAILURE) None  . .................................................................................................................................................. Patient's weights and intake/output reviewed: Yes    Patient's Last Weight: 521.9 lbs lbs obtained by standing scale. Difference of 18 lbs less than last documented weight.       Intake/Output Summary (Last 24 hours) at 4/26/2022 1317  Last data filed at 4/25/2022 2345  Gross per 24 hour   Intake 1210 ml   Output 150 ml   Net 1060 ml       Comorbidities Reviewed Yes    Patient has a past medical history of Ambulatory dysfunction, Aortic stenosis, Arthritis, Asthma, Bilateral hilar adenopathy syndrome, CAD (coronary artery disease), Cardiomyopathy ([address] CHF (congestive heart failure) ([address] Chronic pain, COPD (chronic obstructive pulmonary disease) ([address] Depression, Diabetes mellitus ([address] Difficult intravenous access, Emphysema of lung ([address] ESRD (end stage renal disease) on dialysis ([address] Fear of needles, Gastric ulcer, GERD (gastroesophageal reflux disease), Heart valve problem, Hemodialysis patient ([address] History of spinal fracture, Hx of blood clots, Hyperlipidemia, Hypertension, MI (myocardial infarction) ([address] Neuromuscular disorder ([address] Numbness and tingling in left arm, Pneumonia, PONV (postoperative nausea and vomiting), Prolonged emergence from general anesthesia, Sleep apnea, Stroke ([address] TIA (transient ischemic attack), and Unspecified diseases of blood and blood-forming organs. >>For CHF and Comorbidity documentation on Education Time and Topics, please see Education Tab    Progressive Mobility Assessment:  What is this patient's Current Level of Mobility?: Ambulatory-Up Ad [place]  How was this patient Mobilized today?: Edge of Bed, Up to Chair, Bedside Commode,  Up to Toilet/Shower, Up in Room, Up in [place], Unable to Mobilize and Patient Refuses to Mobilize, ambulated 20 ft                 With Whom? Nurse, PCA, PT, OT and Self                 Level of Difficulty/Assistance: Independent     Pt resting in bed at this time on BiPAP. Pt denies shortness of breath. Pt without lower extremity edema.      Patient and/or Family's stated Goal of Care this Admission: reduce shortness of breath, increase activity tolerance, better understand heart failure and disease management, be more comfortable and reduce lower extremity edema prior to discharge        :

## 2022-04-27 ENCOUNTER — TELEPHONE (OUTPATIENT)
Dept: FAMILY MEDICINE CLINIC | Age: 52
End: 2022-04-27

## 2022-04-27 VITALS
SYSTOLIC BLOOD PRESSURE: 145 MMHG | DIASTOLIC BLOOD PRESSURE: 61 MMHG | RESPIRATION RATE: 16 BRPM | TEMPERATURE: 98.1 F | BODY MASS INDEX: 39.51 KG/M2 | WEIGHT: 245.81 LBS | HEIGHT: 66 IN | HEART RATE: 70 BPM | OXYGEN SATURATION: 100 %

## 2022-04-27 DIAGNOSIS — K59.04 CHRONIC IDIOPATHIC CONSTIPATION: ICD-10-CM

## 2022-04-27 LAB
ALBUMIN SERPL-MCNC: 3.4 G/DL (ref 3.4–5)
ANION GAP SERPL CALCULATED.3IONS-SCNC: 16 MMOL/L (ref 3–16)
BUN BLDV-MCNC: 58 MG/DL (ref 7–20)
CALCIUM SERPL-MCNC: 9.5 MG/DL (ref 8.3–10.6)
CHLORIDE BLD-SCNC: 91 MMOL/L (ref 99–110)
CO2: 22 MMOL/L (ref 21–32)
CREAT SERPL-MCNC: 7.4 MG/DL (ref 0.9–1.3)
GFR AFRICAN AMERICAN: 9
GFR NON-AFRICAN AMERICAN: 8
GLUCOSE BLD-MCNC: 238 MG/DL (ref 70–99)
GLUCOSE BLD-MCNC: 266 MG/DL (ref 70–99)
GLUCOSE BLD-MCNC: 295 MG/DL (ref 70–99)
HCT VFR BLD CALC: 23 % (ref 40.5–52.5)
HEMOGLOBIN: 7.5 G/DL (ref 13.5–17.5)
MCH RBC QN AUTO: 29.8 PG (ref 26–34)
MCHC RBC AUTO-ENTMCNC: 32.6 G/DL (ref 31–36)
MCV RBC AUTO: 91.3 FL (ref 80–100)
PDW BLD-RTO: 16.2 % (ref 12.4–15.4)
PERFORMED ON: ABNORMAL
PERFORMED ON: ABNORMAL
PHOSPHORUS: 6.4 MG/DL (ref 2.5–4.9)
PLATELET # BLD: 223 K/UL (ref 135–450)
PMV BLD AUTO: 8.2 FL (ref 5–10.5)
POTASSIUM SERPL-SCNC: 4.6 MMOL/L (ref 3.5–5.1)
RBC # BLD: 2.51 M/UL (ref 4.2–5.9)
SODIUM BLD-SCNC: 129 MMOL/L (ref 136–145)
WBC # BLD: 9.2 K/UL (ref 4–11)

## 2022-04-27 PROCEDURE — 6370000000 HC RX 637 (ALT 250 FOR IP): Performed by: INTERNAL MEDICINE

## 2022-04-27 PROCEDURE — 2700000000 HC OXYGEN THERAPY PER DAY

## 2022-04-27 PROCEDURE — 80069 RENAL FUNCTION PANEL: CPT

## 2022-04-27 PROCEDURE — 90935 HEMODIALYSIS ONE EVALUATION: CPT

## 2022-04-27 PROCEDURE — 94660 CPAP INITIATION&MGMT: CPT

## 2022-04-27 PROCEDURE — 94761 N-INVAS EAR/PLS OXIMETRY MLT: CPT

## 2022-04-27 PROCEDURE — 85027 COMPLETE CBC AUTOMATED: CPT

## 2022-04-27 PROCEDURE — 6360000002 HC RX W HCPCS: Performed by: INTERNAL MEDICINE

## 2022-04-27 RX ORDER — HEPARIN SODIUM 1000 [USP'U]/ML
INJECTION, SOLUTION INTRAVENOUS; SUBCUTANEOUS
Status: DISCONTINUED
Start: 2022-04-27 | End: 2022-04-27 | Stop reason: HOSPADM

## 2022-04-27 RX ORDER — DOCUSATE SODIUM 100 MG/1
100 CAPSULE, LIQUID FILLED ORAL DAILY
Qty: 30 CAPSULE | Refills: 5 | Status: SHIPPED | OUTPATIENT
Start: 2022-04-27

## 2022-04-27 RX ORDER — QUETIAPINE FUMARATE 25 MG/1
25 TABLET, FILM COATED ORAL NIGHTLY
Qty: 60 TABLET | Refills: 3 | Status: ON HOLD | OUTPATIENT
Start: 2022-04-27 | End: 2022-06-28 | Stop reason: HOSPADM

## 2022-04-27 RX ORDER — INSULIN ASPART 100 [IU]/ML
5 INJECTION, SOLUTION INTRAVENOUS; SUBCUTANEOUS
Qty: 15 PEN | Refills: 5 | Status: ON HOLD | OUTPATIENT
Start: 2022-04-27 | End: 2022-06-01 | Stop reason: HOSPADM

## 2022-04-27 RX ORDER — HEPARIN SODIUM 1000 [USP'U]/ML
2750 INJECTION, SOLUTION INTRAVENOUS; SUBCUTANEOUS
Status: DISCONTINUED | OUTPATIENT
Start: 2022-04-27 | End: 2022-04-27 | Stop reason: HOSPADM

## 2022-04-27 RX ORDER — ISOSORBIDE DINITRATE 10 MG/1
10 TABLET ORAL 3 TIMES DAILY
Qty: 90 TABLET | Refills: 3 | Status: ON HOLD | OUTPATIENT
Start: 2022-04-27 | End: 2022-06-08 | Stop reason: HOSPADM

## 2022-04-27 RX ORDER — LINACLOTIDE 145 UG/1
CAPSULE, GELATIN COATED ORAL
Qty: 30 CAPSULE | Refills: 10 | Status: ON HOLD | OUTPATIENT
Start: 2022-04-27 | End: 2022-10-20 | Stop reason: HOSPADM

## 2022-04-27 RX ADMIN — HEPARIN SODIUM 2750 UNITS: 1000 INJECTION INTRAVENOUS; SUBCUTANEOUS at 09:00

## 2022-04-27 RX ADMIN — HEPARIN SODIUM 4200 UNITS: 1000 INJECTION INTRAVENOUS; SUBCUTANEOUS at 12:35

## 2022-04-27 RX ADMIN — PANTOPRAZOLE SODIUM 40 MG: 40 TABLET, DELAYED RELEASE ORAL at 06:10

## 2022-04-27 RX ADMIN — EPOETIN ALFA-EPBX 15000 UNITS: 10000 INJECTION, SOLUTION INTRAVENOUS; SUBCUTANEOUS at 12:30

## 2022-04-27 ASSESSMENT — PAIN DESCRIPTION - LOCATION: LOCATION: BACK

## 2022-04-27 ASSESSMENT — PAIN SCALES - GENERAL: PAINLEVEL_OUTOF10: 3

## 2022-04-27 ASSESSMENT — PAIN DESCRIPTION - ORIENTATION: ORIENTATION: LOWER

## 2022-04-27 ASSESSMENT — PAIN DESCRIPTION - PAIN TYPE: TYPE: CHRONIC PAIN

## 2022-04-27 NOTE — FLOWSHEET NOTE
Treatment time: 3.5 hours  Net UF: 3050 ml    Pre weight: 114.8 kg   Post weight: 111.5 kg  EDW: TBD kg ( outpt HD clinic DW: 116.4 KG, challenged per MD order)    Access used: Left chest wall TDC  Access function: Good with  ml/min    Medications or blood products given: Heparin, Retacrit    Regular outpatient schedule: M.W.F    Summary of response to treatment: Pt tolerated well with HD, vital signs stable, HD completed in full, heparin dwell in TDC, capped and clamped    Cirt Line: Initial Hct: 23.5;   End Profile : A; Refill ( Hct.1 -26.4  subtract Hct 2- 26.5): -0.1 ( no Refill); BV: -11.6 %      Copy of dialysis treatment record placed in chart, to be scanned into EMR.     04/27/22 0855 04/27/22 1235   Vital Signs   BP (!) 118/56 (!) 145/61   Temp 98 °F (36.7 °C) 98.1 °F (36.7 °C)   Pulse 60 70   Resp 18 16   Weight 253 lb 1.4 oz (114.8 kg) 245 lb 13 oz (111.5 kg)   Weight Method Actual;Bed scale Actual;Bed scale   Percent Weight Change -0.95 -2.87   Pain Assessment   Pain Level 3  --    Pain Type Chronic pain  --    Pain Location Back  --    Pain Orientation Lower  --    Patient's Stated Pain Goal 2  --    Response to Pain Intervention Patient satisfied  --    Post-Hemodialysis Assessment   Post-Treatment Procedures  --  Blood returned;Catheter capped, clamped and heparinized x 2 ports   Machine Disinfection Process  --  Acid/Vinegar Clean;Heat Disinfect; Exterior Machine Disinfection   Rinseback Volume (ml)  --  400 ml   Total Liters Processed (l/min)  --  73 l/min   Dialyzer Clearance  --  Moderately streaked   Duration of Treatment (minutes)  --  210 minutes   Heparin amount administered during treatment (units)  --  2750 units   Hemodialysis Intake (ml)  --  400 ml   Hemodialysis Output (ml)  --  3450 ml   NET Removed (ml)  --  3050 ml   Tolerated Treatment  --  Good

## 2022-04-27 NOTE — PROGRESS NOTES
04/27/22 0346   NIV Type   Skin Assessment Clean, dry, & intact   Skin Protection for O2 Device No  (pt doesn't want mepilex)   NIV Started/Stopped On   Equipment Type v60   Mode Bilevel   Mask Type Full face mask   Mask Size Large   Settings/Measurements   IPAP 15 cmH20   CPAP/EPAP 6 cmH2O   Rate Ordered 10   Resp 13   FiO2  30 %   I Time/ I Time % 1 s   Vt Exhaled 874 mL   Minute Volume 11.8 Liters   Mask Leak (lpm) 37 lpm   Comfort Level Good   Using Accessory Muscles No   SpO2 100   Alarm Settings   Alarms On Y   Low Pressure 6 cmH2O   High Pressure  30 cmH2O   Apnea (secs) 20 secs   RR Low  6   RR High 40 br/min   Oxygen Therapy/Pulse Ox   SpO2 100 %

## 2022-04-27 NOTE — DISCHARGE INSTR - DIET

## 2022-04-27 NOTE — PROGRESS NOTES
Progress Note    HISTORY     CC:  Shortness of breath           We are following for ESRD on RRT       Subjective/   HPI:   Seen during HD today    ROS:  Constitutional:  No fevers, No Chills, + weakness  Cardiovascular:  No palpations, no edema  Respiratory:  No wheezing, + SOB  Skin:  No rash, no itching  :  No hematuria, no dysuria     Social Hx:  No Family at the bedside     Past Medical and Surgical History:  - Reviewed, no changes     EXAM       Objective/     Vitals:    04/27/22 0346 04/27/22 0600 04/27/22 0611 04/27/22 0855   BP:   (!) 148/68 (!) 118/56   Pulse:   63 60   Resp: 13  16 18   Temp:   97.3 °F (36.3 °C) 98 °F (36.7 °C)   TempSrc:   Oral    SpO2: 100%  100%    Weight:  255 lb 8.2 oz (115.9 kg)  253 lb 1.4 oz (114.8 kg)   Height:         24HR INTAKE/OUTPUT:      Intake/Output Summary (Last 24 hours) at 4/27/2022 1036  Last data filed at 4/26/2022 2024  Gross per 24 hour   Intake 600 ml   Output --   Net 600 ml     Constitutional:  Ill appearing   Eyes:  Pupils reactive, sclera clear   Neck:  Normal thyroid, no masses   Cardiovascular:  Regular, no rub  Respiratory:  + distress, on bilevel   Psychiatry:  Flat mood/affect, somnolent but awakens to discuss treatment   Abdomen: +bs, soft, nt, no masses   Musculoskeletal: No LE edema, no clubbing   Lymphatics:  No LAD in neck, no supraclavicular nodes   Access:  [facility]      MEDICAL DECISION MAKING       Data/  Recent Labs     04/26/22  1156 04/27/22  0900   WBC 9.1 9.2   HGB 7.9* 7.5*   HCT 24.1* 23.0*   MCV 92.3 91.3    223     Recent Labs     04/26/22  1156 04/27/22  0900   * 129*   K 6.2* 4.6   CL 91* 91*   CO2 18* 22   GLUCOSE 159* 266*   PHOS 10.3* 6.4*   * 58*   CREATININE 13.1* 7.4*   LABGLOM 4* 8*   GFRAA 5* 9*       Assessment/Plan     #ESRD:  Due to diabetic nephropathy  - Schedule:  MWF at [facility].     IM=100.4, now below  -[facility] 4/26/22  Seen during HD today     #Hyperkalemia- did not have HD since last week    #Anemia of chronic disease-CKD:   - DAVON at HD 15,000     #Hypertension:    Improved  with dialysis

## 2022-04-27 NOTE — PROGRESS NOTES
04/27/22 0009   NIV Type   $NIV $Daily Charge   Skin Assessment Clean, dry, & intact   Skin Protection for O2 Device No  (pt does not want mepilex)   NIV Started/Stopped On   Equipment Type v60   Mode Bilevel   Mask Type Full face mask   Mask Size Large   Settings/Measurements   IPAP 15 cmH20   CPAP/EPAP 6 cmH2O   Rate Ordered 10   Resp 13   FiO2  30 %   I Time/ I Time % 1 s   Vt Exhaled 966 mL   Minute Volume 12.4 Liters   Mask Leak (lpm) 38 lpm   Comfort Level Good   Using Accessory Muscles No   SpO2 99  (pt placed on SpO2 monitoring in room, RN notified)   Alarm Settings   Alarms On Y   Low Pressure 6 cmH2O   High Pressure  30 cmH2O   Apnea (secs) 20 secs   RR Low  6   RR High 40 br/min

## 2022-04-27 NOTE — TELEPHONE ENCOUNTER
----- Message from [address] sent at 4/27/2022 12:39 PM EDT -----  Subject: Message to Provider    QUESTIONS  Information for Provider? they are wanting verbal approval to cover home   health care for PT and OT ph for yary [phone] she is with [facility] care   ---------------------------------------------------------------------------  --------------  [address]  What is the best way for the office to contact you? OK to leave message on   voicemail  Preferred Call Back Phone Number? [phone]  ---------------------------------------------------------------------------  --------------  SCRIPT ANSWERS  Relationship to Patient? Third Party  Third Party Type? Home Health Care?    Representative Name? Sherry Arciniega

## 2022-04-27 NOTE — PROGRESS NOTES
Pt states that he is not ready to wear BIPAP at this time. Pt resting on NC. RT will continue to monitor.

## 2022-04-27 NOTE — PROGRESS NOTES
Oxygen documentation:     O2 saturation at REST on ROOM AIR = _96_____%     If saturation is 89% or above please proceed with steps 2 and 3.      O2 saturation with AMBULATION of __20___ feet on ROOM AIR = _76____%   O2 saturation with AMBULATION on ____2___ liter/min = ___94___%     DCP notified: ______

## 2022-04-27 NOTE — TELEPHONE ENCOUNTER
----- Message from Ezequiel Bettencourt sent at 4/27/2022 12:50 PM EDT -----  Subject: Appointment Request    Reason for Call: Routine Hospital Follow Up    QUESTIONS  Type of Appointment? Established Patient  Reason for appointment request? Available appointments did not meet   patient need  Additional Information for Provider? Being D/C today. Needs a hosptial f/u   before 5/4. Please call nurse Estella Dowell back ASA.  ---------------------------------------------------------------------------  --------------  Yina YAN  What is the best way for the office to contact you? OK to leave message on   voicemail  Preferred Call Back Phone Number? [phone]  ---------------------------------------------------------------------------  --------------  SCRIPT ANSWERS  Relationship to Patient? Third Party  Third Party Type? Hospital?   Representative Name? Estella Dowell  (Patient needs follow up visit after hospital discharge) Book first   available appointment within 7 days OF DISCHARGE, if no appt, proceed to   book the next available time slot within 14 days OF DISCHARGE AND Send   Message to Provider. [address] Follow Up appointment cannot be booked   beyond 14 Days and should result in a Message to Provider. ? Yes   Have you been diagnosed with, awaiting test results for, or told that you   are suspected of having COVID-19 (Coronavirus)? (If patient has tested   negative or was tested as a requirement for work, school, or travel and   not based on symptoms, answer no)? No  Within the past 10 days have you developed any of the following symptoms   (answer no if symptoms have been present longer than 10 days or began   more than 10 days ago)? Fever or Chills, Cough, Shortness of breath or   difficulty breathing, Loss of taste or smell, Sore throat, Nasal   congestion, Sneezing or runny nose, Fatigue or generalized body aches   (answer no if pain is specific to a body part e.g. back pain), Diarrhea,   Headache?  No  Have you had close contact with someone with COVID-19 in the last 7 days? No  (Service Expert  click yes below to proceed with [place] As Usual   Scheduling)?  Yes

## 2022-04-27 NOTE — PLAN OF CARE
Problem: Falls - Risk of:  Goal: Will remain free from falls  Description: Will remain free from falls  4/26/2022 2133 by Delois Holstein, RN  Outcome: Progressing     Problem: Falls - Risk of:  Goal: Absence of physical injury  Description: Absence of physical injury  4/26/2022 2133 by Delois Holstein, RN  Outcome: Progressing     Problem: Discharge Planning:  Goal: Discharged to appropriate level of care  Description: Discharged to appropriate level of care  4/26/2022 2133 by Delois Holstein, RN  Outcome: Progressing     Problem: Infection:  Goal: Will remain free from infection  Description: Will remain free from infection  4/26/2022 2133 by Delois Holstein, RN  Outcome: Progressing     Problem: Safety:  Goal: Free from accidental physical injury  Description: Free from accidental physical injury  4/26/2022 2133 by Delois Holstein, RN  Outcome: Progressing     Problem: Safety:  Goal: Free from intentional harm  Description: Free from intentional harm  4/26/2022 2133 by Delois Holstein, RN  Outcome: Progressing     Problem: Daily Care:  Goal: Daily care needs are met  Description: Daily care needs are met  4/26/2022 2133 by Delois Holstein, RN  Outcome: Progressing     Problem: Pain:  Goal: Patient's pain/discomfort is manageable  Description: Patient's pain/discomfort is manageable  4/26/2022 2133 by Delois Holstein, RN  Outcome: Progressing     Problem: Skin Integrity:  Goal: Skin integrity will stabilize  Description: Skin integrity will stabilize  4/26/2022 2133 by Delois Holstein, RN  Outcome: Progressing     Problem: Discharge Planning:  Goal: Patients continuum of care needs are met  Description: Patients continuum of care needs are met  4/26/2022 2133 by Delois Holstein, RN  Outcome: Progressing

## 2022-04-27 NOTE — DISCHARGE SUMMARY
Hospitalist Discharge Summary    Patient ID:  Ivon Collado  [id number]  45 y.o.  1970    Admit date: 4/18/2022    Discharge date: 4/27/2022    Disposition: home with home care    Admission Diagnoses:   Patient Active Problem List   Diagnosis    Sepsis without acute organ dysfunction ([facility]    CHF (congestive heart failure) ([facility]    Asthma-COPD overlap syndrome ([facility]    Coronary artery disease involving native coronary artery of native heart without angina pectoris    PVD (peripheral vascular disease) ([facility]    Bicuspid aortic valve    Bilateral hilar adenopathy syndrome    Claudication in peripheral vascular disease ([facility]    HTN (hypertension), benign    Diabetic neuropathy ([facility]    Type 2 diabetes, uncontrolled, with neuropathy ([facility]    Passive smoke exposure    Depression with anxiety    Pneumonia of right upper lobe due to infectious organism    Obesity (BMI 30-39. 9)    ZAINAB on CPAP    Degeneration of lumbar or lumbosacral intervertebral disc    Lumbar radiculopathy    Lumbosacral spondylosis without myelopathy    Biliary colic    Symptomatic cholelithiasis    Gastroparesis due to DM ([facility])    Angina, class IV ([facility])    Dyspnea    Dyslipidemia    Acute on chronic combined systolic and diastolic heart failure ([facility])    Ischemic cardiomyopathy    Tobacco abuse    CVA (cerebral vascular accident) ([facility]    History of CVA (cerebrovascular accident)    Type 2 diabetes mellitus without complication, without long-term current use of insulin ([facility])    ZAINAB (obstructive sleep apnea)    Pleural effusion    Chronic anemia    Nonrheumatic aortic valve stenosis    Mucus plugging of bronchi    Hemodialysis-associated hypotension    ESRD on dialysis ([facility]    Hypotension due to drugs    Acute diastolic CHF (congestive heart failure) ([facility])    Neuromuscular disorder ([facility])    Renovascular hypertension    Mixed hyperlipidemia    Cigarette nicotine dependence in remission    Pulmonary edema    Fluid overload    Anemia of chronic disease    SOB (shortness of breath) on exertion    Steal syndrome of dialysis vascular access ([facility]    Chronic, continuous use of opioids    Chronic bronchitis ([facility]    Nasal congestion    Hypercholesteremia    Bradycardia    S/P TAVR (transcatheter aortic valve replacement)    Syncope and collapse    PAF (paroxysmal atrial fibrillation) ([facility]    Bilateral leg weakness    GBS (Guillain-[place] syndrome) ([facility]    Sinus pause    Weakness of both lower extremities    Sinus bradycardia    Ataxia    Peripheral vascular occlusive disease ([facility]    Cellulitis of right foot    Iliac artery occlusion, right ([facility]    Cellulitis and abscess of hand    Type 2 diabetes mellitus with hyperglycemia ([facility]    Acute encephalopathy    Acute hypoxemic respiratory failure ([facility]    Acute CVA (cerebrovascular accident) ([facility]    Speech problem    Urinary tract infection with hematuria    Respiratory failure with hypoxia ([facility]    Acute respiratory failure with hypercapnia ([facility]    Acute pulmonary edema ([facility]    Grade II diastolic dysfunction    Shock circulatory ([facility]    Smoker    Normocytic normochromic anemia    NSTEMI (non-ST elevated myocardial infarction) ([facility]    SIRS (systemic inflammatory response syndrome) ([facility]    Atrial flutter ([facility]    Liberty-rectal abscess    Somnolence    Pulmonary edema with diastolic CHF, NYHA class 3 ([facility])    Respiratory failure ([facility]    Former smoker    Cardiomyopathy ([facility]    Morbid obesity with BMI of 40.0-44.9, adult ([facility]       Discharge Diagnoses: Principal Problem:    Acute hypoxemic respiratory failure ([facility]  Active Problems:    S/P TAVR (transcatheter aortic valve replacement)    Former smoker    Cardiomyopathy ([facility]    Morbid obesity with BMI of 40.0-44.9, adult ([facility]    ESRD on dialysis ([facility]    Acute diastolic CHF (congestive heart failure) ([facility])    Acute pulmonary edema ([facility])    Grade II diastolic dysfunction  Resolved Problems:    * No resolved hospital problems. *      Code Status:  Full Code    Condition:  Stable    Discharge Diet: Diet:  ADULT DIET; Regular; Low Potassium (Less than 3000 mg/day)  ADULT ORAL NUTRITION SUPPLEMENT; Breakfast, Dinner; Low Calorie/High Protein Oral Supplement    PCP to do list:  Follow closely - I am concerned about compliance  HD MWF at [facility] Course: 45 y. o. male hospitalized on 4/18/2022   with history of end-stage renal failure CHF, A. fib, hospitalized for recurrent shortness of breath and acute hypoxic respiratory failure secondary to nonadherence to HD.  Patient respiratory failure improved with hemodialysis.  Malfunction of hemodialysis noted, patient had replacement of HD catheter on 4/26. Has hx of repeated noncompliance. Will need home oxygen now. Pt weight decreased from 264 to 245 at time of discharge. He did have elevated troponin on admission due to ESRD/demand ischemia.       Discharge Medications:   Current Discharge Medication List      START taking these medications    Details   isosorbide dinitrate (ISORDIL) 10 MG tablet Take 1 tablet by mouth 3 times daily  Qty: 90 tablet, Refills: 3           Current Discharge Medication List      CONTINUE these medications which have CHANGED    Details   insulin aspart (NOVOLOG FLEXPEN) 100 UNIT/ML injection pen Inject 5 Units into the skin 3 times daily (before meals)  Qty: 15 pen, Refills: 5    Associated Diagnoses: Type 2 diabetes mellitus with diabetic nephropathy, with long-term current use of insulin ([facility])      QUEtiapine (SEROQUEL) 25 MG tablet Take 1 tablet by mouth nightly  Qty: 60 tablet, Refills: 3      docusate sodium (DOK) 100 MG capsule Take 1 capsule by mouth daily  Qty: 30 capsule, Refills: 5           Current Discharge Medication List      CONTINUE these medications which have NOT CHANGED    Details   fluticasone (FLONASE) 50 MCG/ACT nasal spray SHAKE LIQUID AND USE 2 SPRAYS IN EACH NOSTRIL DAILY  Qty: 48 g, Refills: 0    Associated Diagnoses: Nasal congestion      oxyCODONE-acetaminophen (PERCOCET) 7.5-325 MG per tablet Take 1 tablet by mouth every 6 hours as needed (pain) for up to 30 days.   Qty: 120 tablet, Refills: 0    Comments: Reduce doses taken as pain becomes manageable  Associated Diagnoses: Chronic pain syndrome      DULoxetine (CYMBALTA) 30 MG extended release capsule TAKE 1 CAPSULE BY MOUTH EVERY DAY  Qty: 30 capsule, Refills: 10    Associated Diagnoses: Depression, unspecified depression type      tiZANidine (ZANAFLEX) 4 MG tablet TAKE 1 TABLET BY MOUTH THREE TIMES DAILY  Qty: 90 tablet, Refills: 10      mineral oil liquid Take 30 mLs by mouth daily as needed for Constipation  Qty: 300 mL, Refills: 0    Associated Diagnoses: Constipation, unspecified constipation type      sennosides-docusate sodium (SENOKOT-S) 8.6-50 MG tablet Take 1 tablet by mouth daily  Qty: 10 tablet, Refills: 0    Associated Diagnoses: Constipation, unspecified constipation type      cyclobenzaprine (FLEXERIL) 10 MG tablet TAKE 1 TABLET BY MOUTH EVERY 8 HOURS AS NEEDED  Qty: 90 tablet, Refills: 2    Associated Diagnoses: Chronic pain syndrome      metoprolol succinate (TOPROL XL) 50 MG extended release tablet Take 1 tablet by mouth in the morning and at bedtime  Qty: 60 tablet, Refills: 1      apixaban (ELIQUIS) 5 MG TABS tablet Take 1 tablet by mouth 2 times daily  Qty: 60 tablet, Refills: 1      pantoprazole (PROTONIX) 40 MG tablet TAKE 1 TABLET BY MOUTH EVERY MORNING BEFORE BREAKFAST  Qty: 30 tablet, Refills: 1    Associated Diagnoses: Gastroesophageal reflux disease without esophagitis      pravastatin (PRAVACHOL) 40 MG tablet Take 1 tablet by mouth daily  Qty: 90 tablet, Refills: 3      clopidogrel (PLAVIX) 75 MG tablet Take 1 tablet by mouth daily  Qty: 90 tablet, Refills: 3      insulin glargine (BASAGLAR KWIKPEN) 100 UNIT/ML injection pen Inject 30 Units into the skin nightly  Qty: 15 mL, Refills: 1    Associated Diagnoses: Type 2 diabetes mellitus with diabetic nephropathy, with long-term current use of insulin (HCC)      Continuous Blood Gluc Sensor (DEXCOM G6 SENSOR) MISC Every 10 days  Qty: 9 each, Refills: 3    Associated Diagnoses: DM (diabetes mellitus), secondary, uncontrolled, w/neurologic complic (HCC)      Continuous Blood Gluc Transmit (DEXCOM G6 TRANSMITTER) MISC 1 each by Does not apply route every 3 months  Qty: 1 each, Refills: 3    Associated Diagnoses: DM (diabetes mellitus), secondary, uncontrolled, w/neurologic complic (HCC)      Continuous Blood Gluc  (DEXCOM G6 ) ADAM 1 each by Does not apply route Daily with lunch  Qty: 1 each, Refills: 0    Associated Diagnoses: DM (diabetes mellitus), secondary, uncontrolled, w/neurologic complic (HCC)      B Complex-C-Folic Acid (VIRT-CAPS) 1 MG CAPS TAKE 1 CAPSULE BY MOUTH EVERY DAY  Qty: 90 capsule, Refills: 1      Calcium Acetate, Phos Binder, 667 MG CAPS TAKE 1 CAPSULE BY MOUTH THREE TIMES DAILY WITH MEALS  Qty: 90 capsule, Refills: 3      LINZESS 145 MCG capsule TAKE 1 CAPSULE BY MOUTH EVERY MORNING BEFORE BREAKFAST  Qty: 30 capsule, Refills: 10    Associated Diagnoses: Chronic idiopathic constipation      nitroGLYCERIN (NITROSTAT) 0.4 MG SL tablet DISSOLVE 1 TABLET UNDER THE TONGUE AS NEEDED FOR CHEST PAIN EVERY 5 MINUTES UP TO 3 TIMES. IF NO RELIEF CALL 911. Qty: 25 tablet, Refills: 10    Associated Diagnoses: Coronary artery disease involving native heart without angina pectoris, unspecified vessel or lesion type      vitamin D (ERGOCALCIFEROL) 28002 units CAPS capsule TK 1 C PO WEEKLY  Refills: 11      Tiotropium Bromide-Olodaterol (STIOLTO RESPIMAT) 2.5-2.5 MCG/ACT AERS Inhale 2 puffs into the lungs daily  Qty: 2 Inhaler, Refills: 0    Comments: 2 samples given:  Lot #[id number], Exp 7/21 & Lot #[id number], Exp 7/21      Blood Glucose Monitoring Suppl ADAM USE AS DIRECTED.   Qty: 1 Device, Refills: 0    Comments: WITH CONTROL SOLUTION. Alcohol Swabs PADS USE AS DIRECTED  Qty: 300 each, Refills: 3      albuterol sulfate  (90 Base) MCG/ACT inhaler Inhale 2 puffs into the lungs every 6 hours as needed for Wheezing  Qty: 1 Inhaler, Refills: 3      ipratropium-albuterol (DUONEB) 0.5-2.5 (3) MG/3ML SOLN nebulizer solution Inhale 3 mLs into the lungs every 6 hours as needed for Shortness of Breath  Qty: 360 mL, Refills: 1      calcium carbonate (TUMS) 500 MG chewable tablet Take 1 tablet by mouth 3 times daily as needed for Heartburn. Current Discharge Medication List      STOP taking these medications       gabapentin (NEURONTIN) 100 MG capsule Comments:   Reason for Stopping:         Polyethylene Glycol 3350 GRAN Comments:   Reason for Stopping:                   Procedures: tunneled dialysis placement on 4/26    Assessment on Discharge: Stable, improved     Discharge ROS:  A complete review of systems was asked and negative except for tired    Discharge Exam:  BP (!) 118/56   Pulse 60   Temp 98 °F (36.7 °C)   Resp 18   Ht 5' 6\" (1.676 m)   Wt 253 lb 1.4 oz (114.8 kg)   SpO2 100%   BMI 40.85 kg/m²     Gen: obese, ill appearing  HEENT: NC/AT, moist mucous membranes, no oropharyngeal erythema or exudate  Neck: supple, trachea midline, no anterior cervical or SC LAD  Heart:  Normal s1/s2, RRR, no murmurs, gallops, or rubs. 1+ leg edema  Lungs:  diminished bilaterally, no wheeze, no rales, no rhonchi, no crackles, no use of accessory muscles  Abd: bowel sounds present, soft, nontender, nondistended, no masses  Extrem:  No clubbing, cyanosis,  1+ edema  Skin: no rashes or lesions  Psych: A & O x3  Neuro: grossly intact, moves all four extremities.       Pertinent Studies During Hospital Stay:  Radiology:  XR CHEST PORTABLE    Result Date: 4/25/2022  EXAMINATION: ONE X-RAY VIEW OF THE CHEST 4/25/2022 8:40 am COMPARISON: April 18, 2022 HISTORY: ORDERING SYSTEM PROVIDED HISTORY:  Line evaluation TECHNOLOGIST PROVIDED HISTORY: Reason for Exam:  Line evaluation Reason for Exam:  Line eval FINDINGS: Central line has been introduced with the tip likely in the right atrium in adequate position. Increased opacity over the left chest may represent atelectasis or infiltrate. Cardiomegaly. TAVR stent is noted. The right lung is well aerated. Successful placement of a central line via the left internal jugular approach. The tip is likely in the right atrium. No obvious complication. Increased opacity over the left chest may indicate atelectasis and/or small effusion. XR CHEST PORTABLE    Result Date: 4/18/2022  EXAMINATION: ONE XRAY VIEW OF THE CHEST 4/18/2022 12:25 pm COMPARISON: Chest x-ray dated 03/17/2022. HISTORY: ORDERING SYSTEM PROVIDED HISTORY: sob TECHNOLOGIST PROVIDED HISTORY: Reason for exam:->sob Reason for Exam: sob FINDINGS: LINES/TUBES/OTHER: Left IJ tunneled hemodialysis catheter is noted and in satisfactory position. HEART/MEDIASTINUM: The cardiomediastinal silhouette is stable. PLEURA/LUNGS: There are increased interstitial markings reflecting pulmonary vascular congestion/interstitial edema. There are probable small bilateral pleural effusions. .  There is no appreciable pneumothorax. BONES/SOFT TISSUE: No acute abnormality. Pulmonary vascular congestion/interstitial edema. Probable small bilateral pleural effusions. IR TUNNELED CVC PLACE WO SQ PORT/PUMP > 5 YEARS    Result Date: 4/26/2022  PROCEDURE: FLUOROSCOPY GUIDED PLACEMENT OF A TUNNELED CATHETER. MODERATE CONSCIOUS SEDATION 4/26/2022 HISTORY: ORDERING SYSTEM PROVIDED HISTORY:  TDC exchange. Just changed but attempted dialysis multiple times and would not run at all. TECHNOLOGIST PROVIDED HISTORY: Reason for Exam:  TDC exchange. Just changed but attempted dialysis multiple times and would not run at all. Reason for Exam:  ? fibrin sheath How many lumens are being requested? 2 What side should this line be placed?   Left What site is the preferred site? Internal Jugular SEDATION: Versed 1 mg IV and Fentanyl 50 mcg IV were administered. Following administration of the medications, patient was continuously monitored throughout the procedure by the Radiology Specials nurse with radiologist in attendance. Total monitoring time was approximately 20 minutes. FLUOROSCOPY DOSE AND TYPE OR TIME AND EXPOSURES: Fluoroscopic time: 1 minute 40 seconds. AIR KERMA: 473.20 mGy. Number of fluoroscopic images obtained:  0. Fluoroscopic images that were obtained were generated using last image hold technique. TECHNIQUE: DIALYSIS CATHETER: 14.5 Kyr[place]z x 28 cm Palindrome Chronic Dual Lumen Catheter. ANTIBIOTICS: Ancef 2 grams IV. Informed consent was obtained prior to initiation of the procedure. Patient was placed on the fluoroscopic table in supine position. Time-out was performed. The left anterior upper chest including existing tunneled dialysis catheter was then prepped and draped in sterile fashion. Skin insertion site for patient's existing dialysis catheter was anesthetized with 2% Ldocaine. A 0.035 guidewire was then placed through the tunneled dialysis catheter with distal tip advanced into the right atrium under fluoroscopic guidance. Existing tunneled dialysis catheter was removed. Following this, the replacement dialysis catheter was placed over the guidewire with distal tip advanced into the region of the right atrium under fluoroscopic guidance. Guidewire was removed. Catheter ports flushed without difficulty. Following this, catheter ports were locked with heparin solution (1000 units/ml). Proximal aspect of the catheter was transfixed to the patient's skin using two 2-0 silk sutures. Patient tolerated the procedure well. FINDINGS: Fluoroscopic image demonstrates the tip of the catheter in the region of the right atrium. Status post successful replacement of left internal jugular tunneled dialysis catheter as described above.      IR TUNNELED CVC PLACE WO SQ PORT/PUMP > 5 YEARS    Result Date: 4/26/2022  PROCEDURE: FLUOROSCOPY GUIDED PLACEMENT OF A TUNNELED CATHETER. MODERATE CONSCIOUS SEDATION 4/21/2022. HISTORY: ORDERING SYSTEM PROVIDED HISTORY: TDC exchange - poor flows and catheter is either clotted or has a fibrin sheath TECHNOLOGIST PROVIDED HISTORY: Reason for exam:->TDC exchange - poor flows and catheter is either clotted or has a fibrin sheath How many lumens are being requested?->2 What side should this line be placed? ->Left What site is the preferred site? ->Internal Jugular SEDATION: Versed 1 mg IV and Fentanyl 50 mcg IV were administered. Following administration of the medications, patient was continuously monitored throughout the procedure by the Radiology Specials nurse with radiologist in attendance. Total monitoring time was approximately 20 minutes. FLUOROSCOPY DOSE AND TYPE OR TIME AND EXPOSURES: Fluoroscopic time: 2.2 minutes. AIR KERMA: 119.74 mGy. Number of fluoroscopic images obtained:  0. Fluoroscopic images that were obtained were generated using last image hold technique. TECHNIQUE: DIALYSIS CATHETER: 14.5 Guamanian x 23 cm [facility] [employer] Long-Term Hemodialysis Catheter. ANTIBIOTICS: Ancef 2 grams IV. Informed consent was obtained from the patient's wife prior to initiation of the procedure. Time-out was performed. Patient was placed on the fluoroscopic table in supine position. The anterior left upper chest including existing tunneled dialysis catheter was prepped and draped in sterile fashion. Area was anesthetized with 2% lidocaine. Using blunt dissection, scar tissue surrounding the cuff along proximal aspect of the existing catheter was freed up without difficulty. Once the cuff was freed up, a 0.035 guidewire was placed through the existing tunneled dialysis catheter with distal tip of guidewire advanced into the IVC under fluoroscopic guidance. Tunneled dialysis catheter was removed.   Replacement catheter was placed over the guidewire with distal tip advanced into the right atrium without difficulty. Guidewire was removed. Dialysis ports flushed without difficulty. Dialysis ports were then locked with heparin solution (1000 units/mL). Proximal aspect of the catheter was then transfixed to the patient's skin using two 2-0 silk sutures. Patient tolerated the procedure well. FINDINGS: Fluoroscopic image demonstrates the tip of the catheter in the region the right atrium. Status post successful fluoroscopically guided replacement of left internal jugular tunneled dialysis catheter as described above. Home oxygen statement    I met with the patient face to face to discuss home oxygen and the patient agrees. His  oxygen level was 96% on RA  Then drops to 76% on RA with ambulation of 20 feet, and stays at 94% with 2L. Karli Hart He is mobile in the home.       Last Labs on Discharge:     Recent Results (from the past 24 hour(s))   POCT Glucose    Collection Time: 04/26/22 11:28 AM   Result Value Ref Range    POC Glucose 142 (H) 70 - 99 mg/dl    Performed on ACCU-CHEK    CBC with Auto Differential    Collection Time: 04/26/22 11:56 AM   Result Value Ref Range    WBC 9.1 4.0 - 11.0 K/uL    RBC 2.61 (L) 4.20 - 5.90 M/uL    Hemoglobin 7.9 (L) 13.5 - 17.5 g/dL    Hematocrit 24.1 (L) 40.5 - 52.5 %    MCV 92.3 80.0 - 100.0 fL    MCH 30.1 26.0 - 34.0 pg    MCHC 32.6 31.0 - 36.0 g/dL    RDW 16.2 (H) 12.4 - 15.4 %    Platelets 088 945 - 334 K/uL    MPV 8.2 5.0 - 10.5 fL    Neutrophils % 78.2 %    Lymphocytes % 10.8 %    Monocytes % 7.2 %    Eosinophils % 3.6 %    Basophils % 0.2 %    Neutrophils Absolute 7.1 1.7 - 7.7 K/uL    Lymphocytes Absolute 1.0 1.0 - 5.1 K/uL    Monocytes Absolute 0.7 0.0 - 1.3 K/uL    Eosinophils Absolute 0.3 0.0 - 0.6 K/uL    Basophils Absolute 0.0 0.0 - 0.2 K/uL   Renal Function Panel    Collection Time: 04/26/22 11:56 AM   Result Value Ref Range    Sodium 128 (L) 136 - 145 mmol/L    Potassium 6.2 (HH) 3.5 - 5.1 mmol/L    Chloride 91 (L) 99 - 110 mmol/L    CO2 18 (L) 21 - 32 mmol/L    Anion Gap 19 (H) 3 - 16    Glucose 159 (H) 70 - 99 mg/dL     (HH) 7 - 20 mg/dL    CREATININE 13.1 (HH) 0.9 - 1.3 mg/dL    GFR Non-African American 4 (A) >60    GFR  5 (A) >60    Calcium 9.4 8.3 - 10.6 mg/dL    Phosphorus 10.3 (H) 2.5 - 4.9 mg/dL    Albumin 3.6 3.4 - 5.0 g/dL   POCT Glucose    Collection Time: 04/26/22  4:18 PM   Result Value Ref Range    POC Glucose 149 (H) 70 - 99 mg/dl    Performed on ACCU-CHEK    POCT Glucose    Collection Time: 04/26/22  7:57 PM   Result Value Ref Range    POC Glucose 289 (H) 70 - 99 mg/dl    Performed on ACCU-CHEK    POCT Glucose    Collection Time: 04/27/22  7:44 AM   Result Value Ref Range    POC Glucose 238 (H) 70 - 99 mg/dl    Performed on ACCU-CHEK    POCT Glucose    Collection Time: 04/27/22  8:42 AM   Result Value Ref Range    POC Glucose 295 (H) 70 - 99 mg/dl    Performed on ACCU-CHEK    Renal Function Panel    Collection Time: 04/27/22  9:00 AM   Result Value Ref Range    Sodium 129 (L) 136 - 145 mmol/L    Potassium 4.6 3.5 - 5.1 mmol/L    Chloride 91 (L) 99 - 110 mmol/L    CO2 22 21 - 32 mmol/L    Anion Gap 16 3 - 16    Glucose 266 (H) 70 - 99 mg/dL    BUN 58 (H) 7 - 20 mg/dL    CREATININE 7.4 (HH) 0.9 - 1.3 mg/dL    GFR Non-African American 8 (A) >60    GFR  9 (A) >60    Calcium 9.5 8.3 - 10.6 mg/dL    Phosphorus 6.4 (H) 2.5 - 4.9 mg/dL    Albumin 3.4 3.4 - 5.0 g/dL   CBC    Collection Time: 04/27/22  9:00 AM   Result Value Ref Range    WBC 9.2 4.0 - 11.0 K/uL    RBC 2.51 (L) 4.20 - 5.90 M/uL    Hemoglobin 7.5 (L) 13.5 - 17.5 g/dL    Hematocrit 23.0 (L) 40.5 - 52.5 %    MCV 91.3 80.0 - 100.0 fL    MCH 29.8 26.0 - 34.0 pg    MCHC 32.6 31.0 - 36.0 g/dL    RDW 16.2 (H) 12.4 - 15.4 %    Platelets 508 775 - 497 K/uL    MPV 8.2 5.0 - 10.5 fL         Follow up: with Rafy Dover MD    Note that 35 minutes was spent in preparing discharge papers, discussing discharge with patient, medication review, etc.    Thank you Tara Parmar MD for the opportunity to be involved in this patient's care. If you have any questions or concerns please feel free to contact me at [phone].       Electronically signed by Olena Levine MD on 4/27/2022 at 10:27 AM

## 2022-04-27 NOTE — DISCHARGE INSTR - COC
Continuity of Care Form    Patient Name: Jacob Hawk   :  1970  MRN:  [id number]    Admit date:  2022  Discharge date:  2022    Code Status Order: Full Code   Advance Directives:      Admitting Physician:  Paula Mills MD  PCP: Yaw Kitchen MD    Discharging Nurse: [facility] Unit/Room#: 4449/8246-95  Discharging Unit Phone Number: [phone]    Emergency Contact:   Extended Emergency Contact Information  Primary Emergency Contact: Crystal Ann  Address: [address] [place] [address] [facility], [address] [facility],[address] Phone: [phone]  Mobile Phone: [phone]  Relation: Spouse  Secondary Emergency Contact: [address] [place] [address]  Mobile Phone: [phone]  Relation: Brother/Sister  Preferred language: English   needed?  No    Past Surgical History:  Past Surgical History:   Procedure Laterality Date    AORTIC VALVE REPLACEMENT N/A 10/15/2019    TRANSCATHETER AORTIC VALVE REPLACEMENT FEMORAL APPROACH performed by Jaky Moya MD at [facility] Right 2019    PERITONEAL DIALYSIS CATHETER REMOVAL performed by Alonso Layne MD at [address] [place] [address]      WN    CORONARY ANGIOPLASTY WITH STENT PLACEMENT  05/26/15    CYST REMOVAL  2013    EXCISION CYSTS, NECK X2 AND ABDOMINAL benign    DIAGNOSTIC CARDIAC CATH LAB PROCEDURE      DIALYSIS FISTULA CREATION Left 10/30/2017    LEFT BRACHIAL CEPHALIC FISTULA    DIALYSIS FISTULA CREATION Left 3/27/2019    LIGATION  AV FISTULA performed by Van Menon MD at [address] [place] [address], [facility]      IR TUNNELED CATHETER PLACEMENT GREATER THAN 5 YEARS  3/21/2022    IR TUNNELED CATHETER PLACEMENT GREATER THAN 5 YEARS 3/21/2022 [facility]    IR TUNNELED CATHETER PLACEMENT GREATER THAN 5 YEARS  2022    IR TUNNELED CATHETER PLACEMENT GREATER THAN 5 YEARS 2022 [facility]    OTHER SURGICAL HISTORY  02/01/2017    laparoscopic cholecystectomy with intraoperative cholangiogram    OTHER SURGICAL HISTORY  2018    PORT PLACEMENT  - vas cath    OTHER SURGICAL HISTORY Bilateral 06/26/2018    laprascopic peritoneal dialysis catheter placement    OTHER SURGICAL HISTORY Right 09/2018    peritoneal dialysis port placed on right side of abdomen    OTHER SURGICAL HISTORY  05/28/2019    PTA/Stenting R External Iliac artery    IA LAP INSERTION TUNNELED INTRAPERITONEAL CATHETER N/A 9/21/2018    LAPAROSCOPIC PERITONEAL DIALYSIS CATHETER REPLACEMENT performed by Adam Sharp MD at [address] 2/24/2022    PERINEAL ABCESS DRAINAGE performed by Adam Sharp MD at [address] [facility]  01/06/2016    UPPER GASTROINTESTINAL ENDOSCOPY  01/29/2017    possible candida, otherwise normal appearing    VASCULAR SURGERY  aprx 2 years ago    2 stents placed, each side of groin       Immunization History:   Immunization History   Administered Date(s) Administered    Hepatitis B Adult (Engerix-B) 11/18/2017, 06/13/2018, 07/13/2018    Influenza Virus Vaccine 12/27/2012, 10/07/2014, 11/20/2015, 10/16/2019    Influenza, Intradermal, Quadrivalent, Preservative Free 11/16/2016    Influenza, MDCK Quadv, IM, PF (Flucelvax 2 yrs and older) 10/14/2017, 09/30/2020, 10/05/2021    Influenza, Loulou Broody, 6 mo and older, IM, PF (Flulaval, Fluarix) 09/15/2018    Influenza, Quadv, IM, PF (6 mo and older Fluzone, Flulaval, Fluarix, and 3 yrs and older Afluria) 10/16/2019    PPD Test 11/09/2017, 11/16/2017, 01/03/2019, 01/06/2020, 01/15/2021    Pneumococcal Conjugate 13-valent ([id number]) 10/14/2017    Pneumococcal Polysaccharide ([id number]) 10/07/2014, 11/19/2019    Tdap (Boostrix, Adacel) 02/13/2020    Zoster Recombinant (Shingrix) 02/13/2020       Active Problems:  Patient Active Problem List   Diagnosis Code    Sepsis without acute organ dysfunction ([facility] A41.9    CHF (congestive heart failure) ([facility]) I50.9    Asthma-COPD overlap syndrome ([facility] J44.9    Coronary artery disease involving native coronary artery of native heart without angina pectoris I25.10    PVD (peripheral vascular disease) ([facility]) I73.9    Bicuspid aortic valve Q23.1    Bilateral hilar adenopathy syndrome R59.0    Claudication in peripheral vascular disease ([facility]) I73.9    HTN (hypertension), benign I10    Diabetic neuropathy ([facility]) E11.40    Type 2 diabetes, uncontrolled, with neuropathy ([facility]) E11.40, E11.65    Passive smoke exposure Z77.22    Depression with anxiety F41.8    Pneumonia of right upper lobe due to infectious organism J18.9    Obesity (BMI 30-39. 9) E66.9    ZAINAB on CPAP G47.33, Z99.89    Degeneration of lumbar or lumbosacral intervertebral disc M51.37    Lumbar radiculopathy M54.16    Lumbosacral spondylosis without myelopathy K89.826    Biliary colic O55.51    Symptomatic cholelithiasis K80.20    Gastroparesis due to DM ([facility]) E11.43, K31.84    Angina, class IV ([facility]) I20.9    Dyspnea R06.00    Dyslipidemia E78.5    Acute on chronic combined systolic and diastolic heart failure ([facility]) I50.43    Ischemic cardiomyopathy I25.5    Tobacco abuse Z72.0    CVA (cerebral vascular accident) ([facility] I63.9    History of CVA (cerebrovascular accident) Z86.73    Type 2 diabetes mellitus without complication, without long-term current use of insulin ([facility]) E11.9    ZAINAB (obstructive sleep apnea) G47.33    Pleural effusion J90    Chronic anemia D64.9    Nonrheumatic aortic valve stenosis I35.0    Mucus plugging of bronchi T17.500A    Hemodialysis-associated hypotension I95.3    ESRD on dialysis ([facility]) N18.6, Z99.2    Hypotension due to drugs I64.7    Acute diastolic CHF (congestive heart failure) ([facility]) I50.31    Neuromuscular disorder ([facility]) G70.9    Renovascular hypertension I15.0    Mixed hyperlipidemia E78.2    Cigarette nicotine dependence in remission F17.211    Pulmonary edema J81.1    Fluid overload E87.70    Anemia of chronic disease D63.8    SOB (shortness of breath) on exertion R06.02    Steal syndrome of dialysis vascular access [facility]) T82.898A    Chronic, continuous use of opioids F11.90    Chronic bronchitis ([facility]) J42    Nasal congestion R09.81    Hypercholesteremia E78.00    Bradycardia R00.1    S/P TAVR (transcatheter aortic valve replacement) Z95.2    Syncope and collapse R55    PAF (paroxysmal atrial fibrillation) ([facility]) I48.0    Bilateral leg weakness R29.898    GBS (Guillain-[place] syndrome) ([facility]) G61.0    Sinus pause I45.5    Weakness of both lower extremities R29.898    Sinus bradycardia R00.1    Ataxia R27.0    Peripheral vascular occlusive disease ([facility]) I73.9    Cellulitis of right foot L03.115    Iliac artery occlusion, right ([facility]) I74.5    Cellulitis and abscess of hand L03.119, L02.519    Type 2 diabetes mellitus with hyperglycemia ([facility]) E11.65    Acute encephalopathy G93.40    Acute hypoxemic respiratory failure ([facility]) J96.01    Acute CVA (cerebrovascular accident) ([facility] I63.9    Speech problem R47.9    Urinary tract infection with hematuria N39.0, R31.9    Respiratory failure with hypoxia ([facility]) J96.91    Acute respiratory failure with hypercapnia ([facility]) J96.02    Acute pulmonary edema ([facility]) J81.0    Grade II diastolic dysfunction X33.57    Shock circulatory ([facility]) R57.9    Smoker F17.200    Normocytic normochromic anemia D64.9    NSTEMI (non-ST elevated myocardial infarction) ([facility]) I21.4    SIRS (systemic inflammatory response syndrome) ([facility]) R65.10    Atrial flutter ([facility]) I48.92    Liberty-rectal abscess K61.1    Somnolence R40.0    Pulmonary edema with diastolic CHF, NYHA class 3 ([facility]) I50.30    Respiratory failure ([facility]) J96.90    Former smoker Z87.891    Cardiomyopathy ([facility] I42.9    Morbid obesity with BMI of 40.0-44.9, adult ([facility]) E66.01, Z68.41       Isolation/Infection:   Isolation            No Isolation          Patient Infection Status       Infection Onset Added Last Indicated Last Indicated By Review Planned Expiration Resolved Resolved By    None active    Resolved    COVID-19 (Rule Out) 04/18/22 04/18/22 04/18/22 COVID-19, Rapid (Ordered)   04/18/22 Rule-Out Test Resulted    COVID-19 (Rule Out) 02/25/22 02/25/22 02/25/22 COVID-19, Rapid (Ordered)   02/25/22 Rule-Out Test Resulted    COVID-19 (Rule Out) 01/12/22 01/12/22 01/12/22 COVID-19, Rapid (Ordered)   01/12/22 Rule-Out Test Resulted    COVID-19 (Rule Out) 11/29/21 11/29/21 11/29/21 COVID-19, Rapid (Ordered)   11/30/21 Rule-Out Test Resulted    COVID-19 (Rule Out) 08/29/21 08/29/21 08/29/21 COVID-19 (Ordered)   08/29/21 Rule-Out Test Resulted    COVID-19 (Rule Out) 12/18/20 12/18/20 12/18/20 COVID-19 (Ordered)   12/18/20 Rule-Out Test Resulted    COVID-19 (Rule Out) 05/04/20 05/04/20 05/04/20 COVID-19 (Ordered)   05/04/20 Rule-Out Test Resulted            Nurse Assessment:  Last Vital Signs: BP (!) 118/56   Pulse 60   Temp 98 °F (36.7 °C)   Resp 18   Ht 5' 6\" (1.676 m)   Wt 253 lb 1.4 oz (114.8 kg)   SpO2 100%   BMI 40.85 kg/m²     Last documented pain score (0-10 scale): Pain Level: 3  Last Weight:   Wt Readings from Last 1 Encounters:   04/27/22 253 lb 1.4 oz (114.8 kg)     Mental Status:  oriented, alert, coherent, logical, thought processes intact, and able to concentrate and follow conversation    IV Access:  - None    Nursing Mobility/ADLs:  Walking   Assisted  Transfer  Assisted  Bathing  Assisted  Dressing  Independent  Toileting  Independent  Feeding  Independent  Med [address]  Independent  Med Delivery   whole    Wound Care Documentation and Therapy:  Wound 08/30/21 Toe (Comment  which one) Right Great Toe (Active)   Number of days: 239       Wound 01/12/22 Pedal Anterior;Right Healing scab from previous blister (per pt), flaky edges (Active)   Dressing/Treatment Open to air 04/21/22 2106   Drainage Amount None 04/26/22 1608   Liberty-wound Assessment Intact 04/26/22 1608   Number of days: 105       Wound 01/12/22 Toe (Comment  which one) Anterior;Right Scab from old blister (per pt) on 4th toe, flaky edges (Active)   Dressing Status Other (Comment) 04/26/22 1608   Dressing/Treatment Open to air 04/21/22 2106   Wound Assessment Other (Comment) 04/26/22 1608   Drainage Amount None 04/26/22 1608   Liberty-wound Assessment Intact 04/26/22 1608   Number of days: 105       Incision 02/24/22 Perineum (Active)   Dressing Status Other (Comment) 04/26/22 1608   Dressing/Treatment Open to air 04/26/22 1608   Drainage Amount None 04/26/22 1608   Number of days: 61        Elimination:  Continence: Bowel: Yes  Bladder: Yes  Urinary Catheter: None   Colostomy/Ileostomy/Ileal Conduit: No       Date of Last BM: 4/25/22    Intake/Output Summary (Last 24 hours) at 4/27/2022 1001  Last data filed at 4/26/2022 2024  Gross per 24 hour   Intake 600 ml   Output --   Net 600 ml     I/O last 3 completed shifts: In: 65 [P.O.:840; I.V.:10]  Out: 150 [Urine:150]    Safety Concerns:     None    Impairments/Disabilities:      None    Nutrition Therapy:  Current Nutrition Therapy:   - Oral Diet:  General    Routes of Feeding: Oral  Liquids: Thin Liquids  Daily Fluid Restriction: yes - amount 2000ml  Last Modified Barium Swallow with Video (Video Swallowing Test): not done    Treatments at the Time of Hospital Discharge:   Respiratory Treatments: none  Oxygen Therapy:  is on oxygen at 2.5 L/min per nasal cannula.   Ventilator:    - No ventilator support    Rehab Therapies: Physical Therapy and Occupational Therapy  Weight Bearing Status/Restrictions: No weight bearing restrictions  Other Medical Equipment (for information only, NOT a DME order):  walker  Other Treatments: none    Patient's personal belongings (please select all that are sent with patient):  None    RN SIGNATURE:  Electronically signed by You Mendoza RN on 4/27/22 at 2:14 PM EDT    CASE MANAGEMENT/SOCIAL WORK SECTION    Inpatient Status Date: ***    Readmission Risk Assessment Score:  Readmission Risk              Risk of Unplanned Readmission:  83           Discharging to Facility/ Agency   Name: [facility] [facility] [place], [address] [facility] [address]         Phone: [phone]       Fax: [phone]          [place]. , [address] [facility] [address]         Phone: [phone]       Fax: [phone]        Dialysis Facility (if applicable)   Name: [facility]  Address: [place] [address]  Dialysis Schedule:[facility] [address]  Phone:  Fax:    / signature: Electronically signed by NINO Archibald on 4/27/22 at 10:07 AM EDT    PHYSICIAN SECTION    Prognosis: Fair    Condition at Discharge: Stable    Rehab Potential (if transferring to Rehab): Good    Recommended Labs or Other Treatments After Discharge: home oxygen, assistance with meds, social work    Physician Certification: I certify the above information and transfer of Carlton Bergman  is necessary for the continuing treatment of the diagnosis listed and that he requires [address] for greater 30 days.      Update Admission H&P: Changes in H&P as follows - see discharge summary    PHYSICIAN SIGNATURE:  Electronically signed by Anabel Rebolledo MD on 4/27/22 at 10:29 AM EDT

## 2022-04-27 NOTE — PROGRESS NOTES
Writer placed call to Dr Paz Lau office. They are in the process of moving their office and unable to see pt in office for visit within 7 days. They can offer a virtual visit however pt without phone access.  sending email to office to attempt to set up appt prior to discharge. Also transferred writer to after hours line which was not answered and instructed call back. Pt does have [address] arrangements made.  Cassandra Hinds RN

## 2022-04-28 ENCOUNTER — CARE COORDINATION (OUTPATIENT)
Dept: CASE MANAGEMENT | Age: 52
End: 2022-04-28

## 2022-04-28 DIAGNOSIS — K21.9 GASTROESOPHAGEAL REFLUX DISEASE WITHOUT ESOPHAGITIS: ICD-10-CM

## 2022-04-28 DIAGNOSIS — F39 MOOD DISORDER (HCC): ICD-10-CM

## 2022-04-28 DIAGNOSIS — G47.00 INSOMNIA, UNSPECIFIED TYPE: ICD-10-CM

## 2022-04-28 DIAGNOSIS — G89.4 CHRONIC PAIN SYNDROME: ICD-10-CM

## 2022-04-28 RX ORDER — QUETIAPINE FUMARATE 50 MG/1
TABLET, FILM COATED ORAL
Qty: 30 TABLET | Refills: 10 | Status: SHIPPED | OUTPATIENT
Start: 2022-04-28 | End: 2022-06-30

## 2022-04-28 RX ORDER — CYCLOBENZAPRINE HCL 10 MG
TABLET ORAL
Qty: 90 TABLET | Refills: 10 | Status: ON HOLD | OUTPATIENT
Start: 2022-04-28 | End: 2022-07-21 | Stop reason: SDUPTHER

## 2022-04-28 RX ORDER — PANTOPRAZOLE SODIUM 40 MG/1
TABLET, DELAYED RELEASE ORAL
Qty: 30 TABLET | Refills: 10 | Status: SHIPPED | OUTPATIENT
Start: 2022-04-28

## 2022-04-28 NOTE — TELEPHONE ENCOUNTER
Last Office Visit  -  1/18/22  Next Office Visit  -  n/    Last Filled  -    Last UDS -    Contract -

## 2022-04-28 NOTE — CARE COORDINATION
[facility] Transitions Initial Follow Up Call    Call within 2 business days of discharge: Yes    Patient: Varinder Abad Patient : 1970   MRN: [id number]  Reason for Admission: ARF  Discharge Date: 22 RARS: Readmission Risk Score: 46.4 ( )      Last Discharge [facility]       Complaint Diagnosis Description Type Department Provider    22 Shortness of Breath Acute respiratory failure, unspecified whether with hypoxia or hypercapnia ([facility] . .. ED to Hosp-Admission (Discharged) (ADMITTED) Louis Arnold MD; Turner Smith Spoke with: Lashae at [facility]  Referral was received, but they have been unsuccessful in reaching patient to get [facility] visit scheduled. No option to leave message on patient's provided number in system. Stated \"caller is not accepting calls. Try your call again later\"    Facility: [address] [place] [address] Transitions 24 Hour Call    Do you have all of your prescriptions and are they filled?: Yes  Care Transitions Interventions         Follow Up  No future appointments.     Veronica Harmon RN

## 2022-04-29 ENCOUNTER — FOLLOWUP TELEPHONE ENCOUNTER (OUTPATIENT)
Dept: TELEMETRY | Age: 52
End: 2022-04-29

## 2022-04-29 ENCOUNTER — CARE COORDINATION (OUTPATIENT)
Dept: CASE MANAGEMENT | Age: 52
End: 2022-04-29

## 2022-04-29 NOTE — CARE COORDINATION
[address] Transitions Initial Follow Up Call    Call within 2 business days of discharge: Yes    Patient: Mercedez Flores Patient : 1970   MRN: [id number]  Reason for Admission: ARF/ESRD  Discharge Date: 22 RARS: Readmission Risk Score: 46.4 ( )      Last Discharge [address] [place] [address]       Complaint Diagnosis Description Type Department Provider    22 Shortness of Breath Acute respiratory failure, unspecified whether with hypoxia or hypercapnia ([facility] . .. ED to Hosp-Admission (Discharged) (ADMITTED) Gennaro Marie MD; Turner ALMEIDA .. Second and final attempt made to reach patient for initial post hospital transition call. No option to leave message on patient's provided number in system. Stated \"caller is not accepting calls. Try your call again later\"  Episode ended due to unsuccessful contact      Care Transitions 24 Hour Call    Do you have all of your prescriptions and are they filled?: Yes  Care Transitions Interventions         Follow Up  No future appointments.     Mango Kyle RN

## 2022-04-29 NOTE — TELEPHONE ENCOUNTER
Writer attempted hospital follow up despite during in patient admission that pt stated his phone was not working.  answered and phone is not in service at this time. Writer also tried spouses phone as it is the only other number listed without success.   Jaky Ramirez RN

## 2022-05-04 ENCOUNTER — TELEPHONE (OUTPATIENT)
Dept: FAMILY MEDICINE CLINIC | Age: 52
End: 2022-05-04

## 2022-05-04 DIAGNOSIS — G89.4 CHRONIC PAIN SYNDROME: ICD-10-CM

## 2022-05-04 RX ORDER — OXYCODONE AND ACETAMINOPHEN 7.5; 325 MG/1; MG/1
1 TABLET ORAL EVERY 6 HOURS PRN
Qty: 120 TABLET | Refills: 0 | Status: ON HOLD | OUTPATIENT
Start: 2022-05-04 | End: 2022-06-03 | Stop reason: SDUPTHER

## 2022-05-04 NOTE — TELEPHONE ENCOUNTER
You Petit , a physical therapist is requesting verbal orders for physical therapy for the patient two times a week for 4 weeks. Please call [phone] with verbal OK.

## 2022-05-04 NOTE — TELEPHONE ENCOUNTER
Last seen 12/10/21   Future no  Refill    oxyCODONE-acetaminophen (PERCOCET) 7.5-325 MG per tablet    Jeimy Monroe

## 2022-05-04 NOTE — TELEPHONE ENCOUNTER
Received call from Pt's , Prashanth Hinkle, from [facility] stating that Pt was in the hospital 4/18-4/27 for raspatory failure    FYI     Ph. [phone]

## 2022-05-09 ENCOUNTER — APPOINTMENT (OUTPATIENT)
Dept: GENERAL RADIOLOGY | Age: 52
End: 2022-05-09
Payer: COMMERCIAL

## 2022-05-09 ENCOUNTER — HOSPITAL ENCOUNTER (EMERGENCY)
Age: 52
Discharge: HOME | End: 2022-05-09
Attending: STUDENT IN AN ORGANIZED HEALTH CARE EDUCATION/TRAINING PROGRAM
Payer: COMMERCIAL

## 2022-05-09 VITALS
SYSTOLIC BLOOD PRESSURE: 128 MMHG | HEART RATE: 92 BPM | TEMPERATURE: 98.7 F | DIASTOLIC BLOOD PRESSURE: 76 MMHG | OXYGEN SATURATION: 98 % | RESPIRATION RATE: 15 BRPM

## 2022-05-09 DIAGNOSIS — N18.6 ESRD (END STAGE RENAL DISEASE) ON DIALYSIS (HCC): ICD-10-CM

## 2022-05-09 DIAGNOSIS — Z99.2 ESRD (END STAGE RENAL DISEASE) ON DIALYSIS (HCC): ICD-10-CM

## 2022-05-09 DIAGNOSIS — R06.02 SHORTNESS OF BREATH: Primary | ICD-10-CM

## 2022-05-09 LAB
A/G RATIO: 1.3 (ref 1.1–2.2)
ALBUMIN SERPL-MCNC: 4.1 G/DL (ref 3.4–5)
ALP BLD-CCNC: 98 U/L (ref 40–129)
ALT SERPL-CCNC: <5 U/L (ref 10–40)
ANION GAP SERPL CALCULATED.3IONS-SCNC: 17 MMOL/L (ref 3–16)
AST SERPL-CCNC: 13 U/L (ref 15–37)
BASOPHILS ABSOLUTE: 0.1 K/UL (ref 0–0.2)
BASOPHILS RELATIVE PERCENT: 0.9 %
BILIRUB SERPL-MCNC: <0.2 MG/DL (ref 0–1)
BUN BLDV-MCNC: 56 MG/DL (ref 7–20)
CALCIUM SERPL-MCNC: 8.8 MG/DL (ref 8.3–10.6)
CHLORIDE BLD-SCNC: 92 MMOL/L (ref 99–110)
CO2: 23 MMOL/L (ref 21–32)
CREAT SERPL-MCNC: 7.6 MG/DL (ref 0.9–1.3)
EKG ATRIAL RATE: 86 BPM
EKG DIAGNOSIS: NORMAL
EKG P AXIS: 82 DEGREES
EKG P-R INTERVAL: 180 MS
EKG Q-T INTERVAL: 402 MS
EKG QRS DURATION: 94 MS
EKG QTC CALCULATION (BAZETT): 481 MS
EKG R AXIS: 60 DEGREES
EKG T AXIS: 35 DEGREES
EKG VENTRICULAR RATE: 86 BPM
EOSINOPHILS ABSOLUTE: 0.3 K/UL (ref 0–0.6)
EOSINOPHILS RELATIVE PERCENT: 2.6 %
GFR AFRICAN AMERICAN: 9
GFR NON-AFRICAN AMERICAN: 7
GLUCOSE BLD-MCNC: 177 MG/DL (ref 70–99)
HCT VFR BLD CALC: 27.8 % (ref 40.5–52.5)
HEMOGLOBIN: 8.7 G/DL (ref 13.5–17.5)
LYMPHOCYTES ABSOLUTE: 0.7 K/UL (ref 1–5.1)
LYMPHOCYTES RELATIVE PERCENT: 5.9 %
MCH RBC QN AUTO: 28.9 PG (ref 26–34)
MCHC RBC AUTO-ENTMCNC: 31.3 G/DL (ref 31–36)
MCV RBC AUTO: 92.2 FL (ref 80–100)
MONOCYTES ABSOLUTE: 0.8 K/UL (ref 0–1.3)
MONOCYTES RELATIVE PERCENT: 6.5 %
NEUTROPHILS ABSOLUTE: 9.7 K/UL (ref 1.7–7.7)
NEUTROPHILS RELATIVE PERCENT: 84.1 %
PDW BLD-RTO: 16.3 % (ref 12.4–15.4)
PLATELET # BLD: 298 K/UL (ref 135–450)
PMV BLD AUTO: 8.5 FL (ref 5–10.5)
POTASSIUM REFLEX MAGNESIUM: 4.1 MMOL/L (ref 3.5–5.1)
PRO-BNP: ABNORMAL PG/ML (ref 0–124)
RBC # BLD: 3.01 M/UL (ref 4.2–5.9)
SODIUM BLD-SCNC: 132 MMOL/L (ref 136–145)
TOTAL PROTEIN: 7.3 G/DL (ref 6.4–8.2)
TROPONIN: 0.14 NG/ML
WBC # BLD: 11.5 K/UL (ref 4–11)

## 2022-05-09 PROCEDURE — 6370000000 HC RX 637 (ALT 250 FOR IP): Performed by: PHYSICIAN ASSISTANT

## 2022-05-09 PROCEDURE — 80053 COMPREHEN METABOLIC PANEL: CPT

## 2022-05-09 PROCEDURE — 71045 X-RAY EXAM CHEST 1 VIEW: CPT

## 2022-05-09 PROCEDURE — 99285 EMERGENCY DEPT VISIT HI MDM: CPT

## 2022-05-09 PROCEDURE — 84484 ASSAY OF TROPONIN QUANT: CPT

## 2022-05-09 PROCEDURE — 93010 ELECTROCARDIOGRAM REPORT: CPT | Performed by: INTERNAL MEDICINE

## 2022-05-09 PROCEDURE — 83880 ASSAY OF NATRIURETIC PEPTIDE: CPT

## 2022-05-09 PROCEDURE — 93005 ELECTROCARDIOGRAM TRACING: CPT | Performed by: PHYSICIAN ASSISTANT

## 2022-05-09 PROCEDURE — 85025 COMPLETE CBC W/AUTO DIFF WBC: CPT

## 2022-05-09 RX ORDER — LIDOCAINE HYDROCHLORIDE 10 MG/ML
5 INJECTION, SOLUTION INFILTRATION; PERINEURAL ONCE
Status: DISCONTINUED | OUTPATIENT
Start: 2022-05-09 | End: 2022-05-09 | Stop reason: HOSPADM

## 2022-05-09 RX ORDER — OXYCODONE AND ACETAMINOPHEN 7.5; 325 MG/1; MG/1
1 TABLET ORAL ONCE
Status: COMPLETED | OUTPATIENT
Start: 2022-05-09 | End: 2022-05-09

## 2022-05-09 RX ORDER — SODIUM CHLORIDE 0.9 % (FLUSH) 0.9 %
5-40 SYRINGE (ML) INJECTION PRN
Status: DISCONTINUED | OUTPATIENT
Start: 2022-05-09 | End: 2022-05-09 | Stop reason: HOSPADM

## 2022-05-09 RX ORDER — SODIUM CHLORIDE 9 MG/ML
25 INJECTION, SOLUTION INTRAVENOUS PRN
Status: DISCONTINUED | OUTPATIENT
Start: 2022-05-09 | End: 2022-05-09 | Stop reason: HOSPADM

## 2022-05-09 RX ORDER — CLOPIDOGREL BISULFATE 75 MG/1
75 TABLET ORAL DAILY
Qty: 90 TABLET | Refills: 3 | Status: SHIPPED | OUTPATIENT
Start: 2022-05-09

## 2022-05-09 RX ORDER — SODIUM CHLORIDE 0.9 % (FLUSH) 0.9 %
5-40 SYRINGE (ML) INJECTION EVERY 12 HOURS SCHEDULED
Status: DISCONTINUED | OUTPATIENT
Start: 2022-05-09 | End: 2022-05-09 | Stop reason: HOSPADM

## 2022-05-09 RX ADMIN — OXYCODONE AND ACETAMINOPHEN 1 TABLET: 7.5; 325 TABLET ORAL at 11:38

## 2022-05-09 ASSESSMENT — ENCOUNTER SYMPTOMS
BACK PAIN: 0
COUGH: 0
COLOR CHANGE: 0
EYES NEGATIVE: 1
VOMITING: 0
ABDOMINAL PAIN: 0
SHORTNESS OF BREATH: 1
NAUSEA: 0

## 2022-05-09 ASSESSMENT — PAIN DESCRIPTION - LOCATION: LOCATION: BACK

## 2022-05-09 ASSESSMENT — PAIN - FUNCTIONAL ASSESSMENT: PAIN_FUNCTIONAL_ASSESSMENT: NONE - DENIES PAIN

## 2022-05-09 ASSESSMENT — PAIN SCALES - GENERAL
PAINLEVEL_OUTOF10: 10
PAINLEVEL_OUTOF10: 10

## 2022-05-09 NOTE — ED NOTES
Pt home with nephew. Instructed to go to dialysis this afternoon, verbalized understanding. Sent with hospital portable oxygen tank.      Elizabeth Ramirez RN  05/09/22 8191

## 2022-05-09 NOTE — ED PROVIDER NOTES
[address] [facility]  ED  EMERGENCY DEPARTMENT ENCOUNTER        Pt Name: Leander Garcia  MRN: [id number]  [place] 1970  Date of evaluation: 5/9/2022  Provider: Maria C Marte PA-C  PCP: Pattie Dumont MD  ED Attending: Stefani George      This patient was seen by the attending provider      History provided by the patient    CHIEF COMPLAINT:     Chief Complaint   Patient presents with    Shortness of Breath     pt comes from home for sob x1 day was suppose t have dialysis today and not able to go, inital spo2 90% on 2l that pt wears at all time, ems gave douneb and 4l nc improvement to 98%       HISTORY OF PRESENT ILLNESS:      Leander Garcia is a 45 y.o. male who arrives to the ED by EMS from home. Patient is here with shortness of breath. He has an extensive past medical history that includes but is not limited to end-stage renal disease for which he gets dialysis Monday, Wednesday, Friday. He is here with shortness of breath that started last night and has continued through the morning. He was due for dialysis today but due to his shortness of breath, came here instead. He additionally has a history of tobacco abuse with COPD, CAD, CHF, hypertension, hyperlipidemia, type 2 diabetes, CVA. He reports mild cough, no worse than normal.  No acute chest pain. He is oxygen dependent on 2 L and on arrival to the ED, he has been placed on 4 L by EMS is doing well. Nursing Notes were reviewed     REVIEW OF SYSTEMS:     Review of Systems   Constitutional: Negative for chills and fever. HENT: Negative. Eyes: Negative. Respiratory: Positive for shortness of breath. Negative for cough. Cardiovascular: Negative for chest pain and leg swelling. Gastrointestinal: Negative for abdominal pain, nausea and vomiting. Genitourinary: Negative. Musculoskeletal: Negative for back pain and neck pain. Skin: Negative for color change. Neurological: Negative for weakness and headaches.    All other systems reviewed and are negative. Except as noted above in the ROS, all other systems were reviewed and negative.          PAST MEDICAL HISTORY:     Past Medical History:   Diagnosis Date    Ambulatory dysfunction     walker for long distances, SOB with distance    Aortic stenosis     echo 2017    Arthritis     hands and hips    Asthma     Bilateral hilar adenopathy syndrome 6/3/2013    CAD (coronary artery disease)     Dr. Fili Fang [facility]) 04/19/2019    EF= 43%    CHF (congestive heart failure) (HCC)     Chronic pain     COPD (chronic obstructive pulmonary disease) ([address]     pulmonology Dr. Rosa Bean    Depression     Diabetes mellitus ([address]     borderline    Difficult intravenous access     Emphysema of lung ([address]     ESRD (end stage renal disease) on dialysis ([address]     MWF    Fear of needles     Gastric ulcer     GERD (gastroesophageal reflux disease)     Heart valve problem     bicuspic valve    Hemodialysis patient ([address]     History of spinal fracture     work incident    Hx of blood clots     Bilateral lower extremities; stents in place    Hyperlipidemia     Hypertension     MI (myocardial infarction) ([address] 2019    has had 9 MIs. 2019 was the last    Neuromuscular disorder ([address]     due to CVA    Numbness and tingling in left arm     from fistula    Pneumonia     PONV (postoperative nausea and vomiting)     Prolonged emergence from general anesthesia     States requires more medication than most people    Sleep apnea     Uses CPAP    Stroke ([address]     7mm thalamic cva 2017 deficts left side, left side weakness    TIA (transient ischemic attack)     Unspecified diseases of blood and blood-forming organs          SURGICAL HISTORY:      Past Surgical History:   Procedure Laterality Date    AORTIC VALVE REPLACEMENT N/A 10/15/2019    TRANSCATHETER AORTIC VALVE REPLACEMENT FEMORAL APPROACH performed by An Johns MD at [place] [address] CATHETER REMOVAL Right 7/2/2019    PERITONEAL DIALYSIS CATHETER REMOVAL performed by Sona Salas MD at [address] [facility]  2/29/2015    WNL    CORONARY ANGIOPLASTY WITH STENT PLACEMENT  05/26/15    CYST REMOVAL  08/14/2013    EXCISION CYSTS, NECK X2 AND ABDOMINAL benign    DIAGNOSTIC CARDIAC CATH LAB PROCEDURE      DIALYSIS FISTULA CREATION Left 10/30/2017    LEFT BRACHIAL CEPHALIC FISTULA    DIALYSIS FISTULA CREATION Left 3/27/2019    LIGATION  AV FISTULA performed by Bertha Bonds MD at [address] [facility] [address], COLON, DIAGNOSTIC      IR TUNNELED [address] 5 YEARS  3/21/2022    IR TUNNELED CATHETER PLACEMENT GREATER THAN 5 YEARS 3/21/2022 [facility] SPECIAL PROCEDURES    IR TUNNELED CATHETER PLACEMENT GREATER THAN 5 YEARS  4/21/2022    IR TUNNELED CATHETER PLACEMENT GREATER THAN 5 YEARS 4/21/2022 [facility] SPECIAL PROCEDURES    IR TUNNELED CATHETER PLACEMENT GREATER THAN 5 YEARS  4/26/2022    IR TUNNELED CATHETER PLACEMENT GREATER THAN 5 YEARS 4/26/2022 [facility] SPECIAL PROCEDURES    OTHER SURGICAL HISTORY  02/01/2017    laparoscopic cholecystectomy with intraoperative cholangiogram    OTHER SURGICAL HISTORY  2018    PORT PLACEMENT  - vas cath    OTHER SURGICAL HISTORY Bilateral 06/26/2018    laprascopic peritoneal dialysis catheter placement    OTHER SURGICAL HISTORY Right 09/2018    peritoneal dialysis port placed on right side of abdomen    OTHER SURGICAL HISTORY  05/28/2019    PTA/Stenting R External Iliac artery    [place] LAP INSERTION TUNNELED INTRAPERITONEAL CATHETER N/A 9/21/2018    LAPAROSCOPIC PERITONEAL DIALYSIS CATHETER REPLACEMENT performed by Sona Salas MD at [address] [facility] N/A 2/24/2022    PERINEAL ABCESS DRAINAGE performed by Sona Salas MD at [address] [facility] ENDOSCOPY  01/06/2016    UPPER GASTROINTESTINAL ENDOSCOPY  01/29/2017    possible candida, otherwise normal appearing    VASCULAR SURGERY  aprx 2 years ago    2 stents placed, each side of groin         CURRENT MEDICATIONS:       Previous Medications    ALBUTEROL SULFATE  (90 BASE) MCG/ACT INHALER    Inhale 2 puffs into the lungs every 6 hours as needed for Wheezing    ALCOHOL SWABS PADS    USE AS DIRECTED    APIXABAN (ELIQUIS) 5 MG TABS TABLET    Take 1 tablet by mouth 2 times daily    B COMPLEX-C-FOLIC ACID (VIRT-CAPS) 1 MG CAPS    TAKE 1 CAPSULE BY MOUTH EVERY DAY    BLOOD GLUCOSE MONITORING SUPPL ADAM    USE AS DIRECTED. CALCIUM ACETATE, PHOS BINDER, 667 MG CAPS    TAKE 1 CAPSULE BY MOUTH THREE TIMES DAILY WITH MEALS    CALCIUM CARBONATE (TUMS) 500 MG CHEWABLE TABLET    Take 1 tablet by mouth 3 times daily as needed for Heartburn.     CLOPIDOGREL (PLAVIX) 75 MG TABLET    Take 1 tablet by mouth daily    CONTINUOUS BLOOD GLUC  (DEXCOM G6 ) ADAM    1 each by Does not apply route Daily with lunch    CONTINUOUS BLOOD GLUC SENSOR (DEXCOM G6 SENSOR) MISC    Every 10 days    CONTINUOUS BLOOD GLUC TRANSMIT (DEXCOM G6 TRANSMITTER) MISC    1 each by Does not apply route every 3 months    CYCLOBENZAPRINE (FLEXERIL) 10 MG TABLET    TAKE 1 TABLET BY MOUTH EVERY 8 HOURS AS NEEDED    DOCUSATE SODIUM (DOK) 100 MG CAPSULE    Take 1 capsule by mouth daily    DULOXETINE (CYMBALTA) 30 MG EXTENDED RELEASE CAPSULE    TAKE 1 CAPSULE BY MOUTH EVERY DAY    FLUTICASONE (FLONASE) 50 MCG/ACT NASAL SPRAY    SHAKE LIQUID AND USE 2 SPRAYS IN EACH NOSTRIL DAILY    INSULIN ASPART (NOVOLOG FLEXPEN) 100 UNIT/ML INJECTION PEN    Inject 5 Units into the skin 3 times daily (before meals)    INSULIN GLARGINE (BASAGLAR KWIKPEN) 100 UNIT/ML INJECTION PEN    Inject 30 Units into the skin nightly    IPRATROPIUM-ALBUTEROL (DUONEB) 0.5-2.5 (3) MG/3ML SOLN NEBULIZER SOLUTION    Inhale 3 mLs into the lungs every 6 hours as needed for Shortness of Breath    ISOSORBIDE DINITRATE (ISORDIL) 10 MG TABLET    Take 1 tablet by mouth 3 times daily LINZESS 145 MCG CAPSULE    TAKE 1 CAPSULE BY MOUTH IN THE MORNING BEFORE BREAKFAST    METOPROLOL SUCCINATE (TOPROL XL) 50 MG EXTENDED RELEASE TABLET    Take 1 tablet by mouth in the morning and at bedtime    MINERAL OIL LIQUID    Take 30 mLs by mouth daily as needed for Constipation    NITROGLYCERIN (NITROSTAT) 0.4 MG SL TABLET    DISSOLVE 1 TABLET UNDER THE TONGUE AS NEEDED FOR CHEST PAIN EVERY 5 MINUTES UP TO 3 TIMES. IF NO RELIEF CALL 911. OXYCODONE-ACETAMINOPHEN (PERCOCET) 7.5-325 MG PER TABLET    Take 1 tablet by mouth every 6 hours as needed (pain) for up to 30 days.     PANTOPRAZOLE (PROTONIX) 40 MG TABLET    TAKE 1 TABLET BY MOUTH EVERY MORNING BEFORE BREAKFAST    PRAVASTATIN (PRAVACHOL) 40 MG TABLET    Take 1 tablet by mouth daily    QUETIAPINE (SEROQUEL) 25 MG TABLET    Take 1 tablet by mouth nightly    QUETIAPINE (SEROQUEL) 50 MG TABLET    TAKE 1 TABLET BY MOUTH IN THE EVENING    SENNOSIDES-DOCUSATE SODIUM (SENOKOT-S) 8.6-50 MG TABLET    Take 1 tablet by mouth daily    TIOTROPIUM BROMIDE-OLODATEROL (STIOLTO RESPIMAT) 2.5-2.5 MCG/ACT AERS    Inhale 2 puffs into the lungs daily    TIZANIDINE (ZANAFLEX) 4 MG TABLET    TAKE 1 TABLET BY MOUTH THREE TIMES DAILY    VITAMIN D (ERGOCALCIFEROL) 93695 UNITS CAPS CAPSULE    TK 1 C PO WEEKLY         ALLERGIES:    Morphine    FAMILY HISTORY:       Family History   Problem Relation Age of Onset    Diabetes Mother     Heart Disease Father     Kidney Disease Sister         stage 4-kidney failure    Cancer Sister     Heart Disease Sister     Obesity Sister     Cancer Sister     Heart Disease Sister     Obesity Sister     Alcohol Abuse Brother           SOCIAL HISTORY:       Social History     Socioeconomic History    Marital status:      Spouse name: Not on file    Number of children: Not on file    Years of education: Not on file    Highest education level: Not on file   Occupational History    Not on file   Tobacco Use    Smoking status: Former Smoker     Packs/day: 0.50     Years: 33.00     Pack years: 16.50     Types: Cigarettes     Quit date: 2020     Years since quittin.0    Smokeless tobacco: Never Used    Tobacco comment: [facility] quit 2021   Vaping Use    Vaping Use: Never used   Substance and Sexual Activity    Alcohol use: Not Currently     Alcohol/week: 0.0 standard drinks     Comment: occ    Drug use: No    Sexual activity: Yes     Partners: Female     Comment:    Other Topics Concern    Not on file   Social History Narrative    Not on file     Social Determinants of Health     Financial Resource Strain:     Difficulty of Paying Living Expenses: Not on file   Food Insecurity:     Worried About Running Out of Food in the Last Year: Not on file    Louise of Food in the Last Year: Not on file   Transportation Needs:     Lack of Transportation (Medical): Not on file    Lack of Transportation (Non-Medical):  Not on file   Physical Activity:     Days of Exercise per Week: Not on file    Minutes of Exercise per Session: Not on file   Stress:     Feeling of Stress : Not on file   Social Connections:     Frequency of Communication with Friends and Family: Not on file    Frequency of Social Gatherings with Friends and Family: Not on file    Attends Mosque Services: Not on file    Active Member of [address] [place] [address] or Organizations: Not on file    Attends Club or Organization Meetings: Not on file    Marital Status: Not on file   Intimate Partner Violence:     Fear of Current or Ex-Partner: Not on file    Emotionally Abused: Not on file    Physically Abused: Not on file    Sexually Abused: Not on file   Housing Stability:     Unable to Pay for Housing in the Last Year: Not on file    Number of [place] in the Last Year: Not on file    Unstable Housing in the Last Year: Not on file       SCREENINGS:    Robert Coma Scale  Eye Opening: Spontaneous  Best Verbal Response: Oriented  Best Motor Response: Obeys commands  Robert Coma Scale Score: 15        PHYSICAL EXAM:       ED Triage Vitals [05/09/22 1000]   BP Temp Temp Source Pulse Resp SpO2 Height Weight   (!) 133/96 98.7 °F (37.1 °C) Oral 89 22 100 % -- --       Physical Exam    CONSTITUTIONAL: Awake and alert. Cooperative. Well-developed. Well-nourished. Non-toxic. No acute distress. HENT: Normocephalic. Atraumatic. External ears normal, without discharge. No nasal discharge. Oropharynx clear. Mucous membranes moist.  EYES: Conjunctiva non-injected. No scleral icterus. PERRL. EOM's grossly intact. NECK: Supple. Normal ROM. CARDIOVASCULAR: RRR. No Murmer. Intact distal pulses. PULMONARY/CHEST WALL: Effort normal. No tachypnea. Lungs clear to ausculation. ABDOMEN: Obese. Normal BS. Soft. Nondistended. No tenderness to palpate. No guarding. /ANORECTAL: Not assessed  MUSKULOSKELETAL: Normal ROM. No acute deformities. No edema. No tenderness to palpate. SKIN: Warm and dry. No rash. NEUROLOGICAL: Alert and oriented x 3. GCS 15. CN II-XII grossly intact. Strength is 5/5 in all extremities and sensation is intact. Normal gait.    PSYCHIATRIC: Normal affect        DIAGNOSTICRESULTS:     LABS:    Results for orders placed or performed during the hospital encounter of 05/09/22   CBC with Auto Differential   Result Value Ref Range    WBC 11.5 (H) 4.0 - 11.0 K/uL    RBC 3.01 (L) 4.20 - 5.90 M/uL    Hemoglobin 8.7 (L) 13.5 - 17.5 g/dL    Hematocrit 27.8 (L) 40.5 - 52.5 %    MCV 92.2 80.0 - 100.0 fL    MCH 28.9 26.0 - 34.0 pg    MCHC 31.3 31.0 - 36.0 g/dL    RDW 16.3 (H) 12.4 - 15.4 %    Platelets 954 732 - 469 K/uL    MPV 8.5 5.0 - 10.5 fL    Neutrophils % 84.1 %    Lymphocytes % 5.9 %    Monocytes % 6.5 %    Eosinophils % 2.6 %    Basophils % 0.9 %    Neutrophils Absolute 9.7 (H) 1.7 - 7.7 K/uL    Lymphocytes Absolute 0.7 (L) 1.0 - 5.1 K/uL    Monocytes Absolute 0.8 0.0 - 1.3 K/uL    Eosinophils Absolute 0.3 0.0 - 0.6 K/uL    Basophils Absolute 0.1 0.0 - 0.2 K/uL   Comprehensive Metabolic Panel w/ Reflex to MG   Result Value Ref Range    Sodium 132 (L) 136 - 145 mmol/L    Potassium reflex Magnesium 4.1 3.5 - 5.1 mmol/L    Chloride 92 (L) 99 - 110 mmol/L    CO2 23 21 - 32 mmol/L    Anion Gap 17 (H) 3 - 16    Glucose 177 (H) 70 - 99 mg/dL    BUN 56 (H) 7 - 20 mg/dL    CREATININE 7.6 (HH) 0.9 - 1.3 mg/dL    GFR Non-African American 7 (A) >60    GFR  9 (A) >60    Calcium 8.8 8.3 - 10.6 mg/dL    Total Protein 7.3 6.4 - 8.2 g/dL    Albumin 4.1 3.4 - 5.0 g/dL    Albumin/Globulin Ratio 1.3 1.1 - 2.2    Total Bilirubin <0.2 0.0 - 1.0 mg/dL    Alkaline Phosphatase 98 40 - 129 U/L    ALT <5 (L) 10 - 40 U/L    AST 13 (L) 15 - 37 U/L   Troponin   Result Value Ref Range    Troponin 0.14 (H) <0.01 ng/mL   Brain Natriuretic Peptide   Result Value Ref Range    Pro-BNP >70,000 (H) 0 - 124 pg/mL   EKG 12 Lead   Result Value Ref Range    Ventricular Rate 86 BPM    Atrial Rate 86 BPM    P-R Interval 180 ms    QRS Duration 94 ms    Q-T Interval 402 ms    QTc Calculation (Bazett) 481 ms    P Axis 82 degrees    R Axis 60 degrees    T Axis 35 degrees    Diagnosis       Normal sinus rhythmNonspecific T wave abnormalityProlonged QTAbnormal ECGWhen compared with ECG of 18-APR-2022 12:28,Nonspecific T wave abnormality now evident in Inferior leads         RADIOLOGY:  All x-ray studies areviewed/reviewed by me. Formal interpretations per the radiologist are as follows:      XR CHEST PORTABLE    Result Date: 5/9/2022  EXAMINATION: ONE XRAY VIEW OF THE CHEST 5/9/2022 10:47 am COMPARISON: Chest x-ray dated 04/25/2022. HISTORY: ORDERING SYSTEM PROVIDED HISTORY: SOB TECHNOLOGIST PROVIDED HISTORY: Reason for exam:->SOB Reason for Exam: sob FINDINGS: LINES/TUBES/OTHER: Left IJ tunneled hemodialysis catheter is again noted and grossly unchanged in position. HEART/MEDIASTINUM: The cardiac silhouette is enlarged, but stable.  PLEURA/LUNGS: There is pulmonary vascular congestion/interstitial edema. There is a questionable small left pleural effusion. There is no appreciable pneumothorax. BONES/SOFT TISSUE: No acute abnormality. Stable cardiomegaly. Pulmonary vascular congestion/interstitial edema. Questionable small left pleural effusion. EKG: The Ekg interpreted by me in the absence of a cardiologist shows. normal sinus rhythm with a rate of 86    See also interpretation by Anneliese Tovar MD.      PROCEDURES:   N/A    CRITICAL CARE TIME:       None      CONSULTS:  None      EMERGENCY DEPARTMENT COURSE and DIFFERENTIAL DIAGNOSIS/MDM:   Vitals:    Vitals:    05/09/22 1000 05/09/22 1218   BP: (!) 133/96 128/76   Pulse: 89 92   Resp: 22 15   Temp: 98.7 °F (37.1 °C)    TempSrc: Oral    SpO2: 100% 98%       Patient was given the following medications:  Medications   lidocaine 1 % injection 5 mL (0 mLs IntraDERmal Held 5/9/22 1126)   sodium chloride flush 0.9 % injection 5-40 mL (0 mLs IntraVENous Held 5/9/22 1126)   sodium chloride flush 0.9 % injection 5-40 mL (has no administration in time range)   0.9 % sodium chloride infusion (has no administration in time range)   oxyCODONE-acetaminophen (PERCOCET) 7.5-325 MG per tablet 1 tablet (1 tablet Oral Given 5/9/22 1138)         Patient was evaluated by both myself and Anneliese Tovar MD.   Old records were reviewed. Patient is here with shortness of breath that started bothering him last night and continued today. He arrives to the ED for the symptoms though does have scheduled dialysis today. On arrival, patient is on 4 L nasal cannula oxygen by EMS as opposed to the 2 L he is typically on. He is not having any chest pain, coughing or feeling of illness other than being short of breath. He does have COPD and heart disease though additionally is in need of dialysis today. Patient placed on telemetry and work-up initiated. EKG normal sinus rhythm, rate 86 bpm.  No acute ischemic changes.   Portable chest x-ray shows stable cardiomegaly. Pulmonary vascular congestion/interstitial edema. Questionable small left pleural effusion. CBC white count 11.5 with H&H 8.7 and 27.8  CMP normal K at 4.1. Glucose 177, BUN is 56 and creatinine 7.6. Troponin 0.14 (which is stable for this patient)  BNP >70,000    Patient was given a scheduled dose of his Percocet 7.5/325. He is remained hemodynamically stable and on reassessing him is actually feeling better. Case management was able to arrange for the patient to have his dialysis today at 2 PM, as an outpatient. Ultimately, I do we that is the main reason the patient is here with shortness of breath and will feel better and ultimately benefit from being dialyzed. No indication for hospitalization at this time. He is not having any chest pain and all labs are stable or better than normal for this patient. Return precautions discussed. I estimate there is LOW risk for PNEUMONIA, PNEUMOTHORAX, PULMONARY EMBOLISM, ACUTE CORONARY SYNDROME, OR THORACIC AORTIC DISSECTION, thus I consider the discharge disposition reasonable. Toñito Scruggs and I have discussed the diagnosis and risks, and we agree with discharging home to follow-up with their primary doctor. We also discussed returning to the Emergency Department immediately if new or worsening symptoms occur. We have discussed the symptoms which are most concerning (e.g., bloody sputum, fever, worsening pain or shortness of breath, vomiting) that necessitate immediate return. FINAL IMPRESSION:      1. Shortness of breath    2.  ESRD (end stage renal disease) on dialysis [facility])          DISPOSITION/PLAN:   DISPOSITION Decision To Discharge      PATIENT REFERRED TO:  Maritza Vicente, [address] [place] [id number]  [phone]            DISCHARGE MEDICATIONS:  New Prescriptions    No medications on file                  (Please note thatportions of this note were completed with a voice recognition program.  Efforts were made to edit the dictations, but occasionally words are mis-transcribed.)    Catarino Ambrose PA-C (electronicallysigned)              Bri Hancock, [place]  05/09/22 6063

## 2022-05-09 NOTE — ED PROVIDER NOTES
I independently performed a history and physical on Malika Hester. All diagnostic, treatment, and disposition decisions were made by myself in conjunction with the advanced practice provider/resident. For further details of [address] emergency department encounter, please see the JOSIANE/resident's documentation. I personally saw the patient and performed a substantive portion of the visit including all aspects of the medical decision making. Briefly, this is a 57-year-old male, end-stage renal disease on dialysis, presenting with concerns for shortness of breath. He typically gets dialyzed Monday Wednesday and Friday, did not go today. He is normally on 2 L of oxygen at baseline. He denies any associated chest pain. Denies other complaints or concerns. He is stable on 2 L here in the department. Chest x-ray shows some evidence of pulmonary edema. Troponin is elevated however actually decreased from previous troponin levels. Believe this is likely secondary to his renal function and not cardiac etiology. We did arrange for patient to attend his dialysis session this afternoon. See no indication at this point in time for hospitalization. I personally saw the patient and independently provided 0 minutes of non-concurrent critical care out of the total shared critical care time provided. 1. Shortness of breath    2. ESRD (end stage renal disease) on dialysis [facility])      Comment: Please note this report has been produced using speech recognition software and may contain errors related to that system including errors in grammar, punctuation, and spelling, as well as words and phrases that may be inappropriate. If there are any questions or concerns please feel free to contact the dictating provider for clarification.        Tawanda Metcalf MD  05/09/22 6413

## 2022-05-09 NOTE — ED NOTES
Difficult IV stick, only able to use right arm d/t dialysis. Able to obtain lab specimens but not able to place PIV.      Nasim Mcadams RN  05/09/22 5237

## 2022-05-09 NOTE — CARE COORDINATION
Patient well known from previous visits. Patient continues to live at home with wife. Reports things at home are \"good\". Patient is current with [facility] for RN, PT/OT. Call placed to [facility]. Patient's HD is at 2:15. MD updated and patient able to d/c home. Patient notified and agreeable to go to HD on d/c.   Electronically signed by NINO Orozco LISW-S on 5/9/2022 at 11:41 AM

## 2022-05-09 NOTE — ED NOTES
Pt on chronic O2 at 2L. Wife coming to  patient from ER, pt states she did not bring portable O2 with her. Will give pt portable O2 tank from ER with instructions to return tank as soon as possible.      Sapphire Quiroga RN  05/09/22 5505

## 2022-05-17 ENCOUNTER — TELEPHONE (OUTPATIENT)
Dept: FAMILY MEDICINE CLINIC | Age: 52
End: 2022-05-17

## 2022-05-17 NOTE — TELEPHONE ENCOUNTER
Rehab medical needs last 2 office notes for eval for wheel chair fax # [phone] atten Diane Stauffer

## 2022-05-24 NOTE — TELEPHONE ENCOUNTER
QUESTIONS   Information for Provider? Dow Rubinstein calling in from rehab med in regards to   request for clinical notes to be able to fulfill order for a wheelchair   and electrical signature from provider Dow Rubinstein can be reached @ fax;   [phone] phone [phone]   ---------------------------------------------------------------------------   --------------   [address]   What is the best way for the office to contact you? OK to leave message on   voicemail   Preferred Call Back Phone Number? [phone]   ---------------------------------------------------------------------------   --------------   SCRIPT ANSWERS   Relationship to Patient? Third Party   Third Party Type? Durable Medical Equipment? Representative Name?  [facility]

## 2022-05-25 ENCOUNTER — APPOINTMENT (OUTPATIENT)
Dept: GENERAL RADIOLOGY | Age: 52
End: 2022-05-25
Payer: COMMERCIAL

## 2022-05-25 ENCOUNTER — HOSPITAL ENCOUNTER (EMERGENCY)
Age: 52
Discharge: HOME | End: 2022-05-25
Attending: EMERGENCY MEDICINE
Payer: COMMERCIAL

## 2022-05-25 ENCOUNTER — APPOINTMENT (OUTPATIENT)
Dept: CT IMAGING | Age: 52
End: 2022-05-25
Payer: COMMERCIAL

## 2022-05-25 VITALS
HEART RATE: 69 BPM | WEIGHT: 245 LBS | DIASTOLIC BLOOD PRESSURE: 90 MMHG | HEIGHT: 66 IN | SYSTOLIC BLOOD PRESSURE: 111 MMHG | BODY MASS INDEX: 39.37 KG/M2 | RESPIRATION RATE: 18 BRPM | OXYGEN SATURATION: 93 % | TEMPERATURE: 98.3 F

## 2022-05-25 DIAGNOSIS — R06.89 HYPERCAPNIA: Primary | ICD-10-CM

## 2022-05-25 DIAGNOSIS — R53.83 FATIGUE, UNSPECIFIED TYPE: ICD-10-CM

## 2022-05-25 LAB
A/G RATIO: 1.4 (ref 1.1–2.2)
ACETAMINOPHEN LEVEL: <5 UG/ML (ref 10–30)
ALBUMIN SERPL-MCNC: 3.9 G/DL (ref 3.4–5)
ALP BLD-CCNC: 90 U/L (ref 40–129)
ALT SERPL-CCNC: <5 U/L (ref 10–40)
AMMONIA: 14 UMOL/L (ref 16–60)
ANION GAP SERPL CALCULATED.3IONS-SCNC: 14 MMOL/L (ref 3–16)
AST SERPL-CCNC: 10 U/L (ref 15–37)
BASE EXCESS VENOUS: 0.6 MMOL/L (ref -3–3)
BASOPHILS ABSOLUTE: 0.1 K/UL (ref 0–0.2)
BASOPHILS RELATIVE PERCENT: 0.7 %
BILIRUB SERPL-MCNC: <0.2 MG/DL (ref 0–1)
BUN BLDV-MCNC: 44 MG/DL (ref 7–20)
CALCIUM SERPL-MCNC: 8.2 MG/DL (ref 8.3–10.6)
CARBOXYHEMOGLOBIN: 2.6 % (ref 0–1.5)
CHLORIDE BLD-SCNC: 87 MMOL/L (ref 99–110)
CO2: 27 MMOL/L (ref 21–32)
CREAT SERPL-MCNC: 6.4 MG/DL (ref 0.9–1.3)
EKG ATRIAL RATE: 68 BPM
EKG DIAGNOSIS: NORMAL
EKG P AXIS: 40 DEGREES
EKG P-R INTERVAL: 184 MS
EKG Q-T INTERVAL: 450 MS
EKG QRS DURATION: 104 MS
EKG QTC CALCULATION (BAZETT): 478 MS
EKG R AXIS: 22 DEGREES
EKG T AXIS: 132 DEGREES
EKG VENTRICULAR RATE: 68 BPM
EOSINOPHILS ABSOLUTE: 0.3 K/UL (ref 0–0.6)
EOSINOPHILS RELATIVE PERCENT: 2.9 %
ETHANOL: NORMAL MG/DL (ref 0–0.08)
GFR AFRICAN AMERICAN: 11
GFR NON-AFRICAN AMERICAN: 9
GLUCOSE BLD-MCNC: 149 MG/DL (ref 70–99)
HCO3 VENOUS: 27.3 MMOL/L (ref 23–29)
HCT VFR BLD CALC: 26.9 % (ref 40.5–52.5)
HEMOGLOBIN: 8.8 G/DL (ref 13.5–17.5)
LIPASE: 31 U/L (ref 13–60)
LYMPHOCYTES ABSOLUTE: 1 K/UL (ref 1–5.1)
LYMPHOCYTES RELATIVE PERCENT: 10.6 %
MCH RBC QN AUTO: 29.8 PG (ref 26–34)
MCHC RBC AUTO-ENTMCNC: 32.8 G/DL (ref 31–36)
MCV RBC AUTO: 90.9 FL (ref 80–100)
METHEMOGLOBIN VENOUS: 0.3 %
MONOCYTES ABSOLUTE: 0.6 K/UL (ref 0–1.3)
MONOCYTES RELATIVE PERCENT: 6.1 %
NEUTROPHILS ABSOLUTE: 7.5 K/UL (ref 1.7–7.7)
NEUTROPHILS RELATIVE PERCENT: 79.7 %
O2 SAT, VEN: 72 %
O2 THERAPY: ABNORMAL
PCO2, VEN: 54.6 MMHG (ref 40–50)
PDW BLD-RTO: 16.4 % (ref 12.4–15.4)
PH VENOUS: 7.32 (ref 7.35–7.45)
PLATELET # BLD: 234 K/UL (ref 135–450)
PMV BLD AUTO: 7.9 FL (ref 5–10.5)
PO2, VEN: 41.2 MMHG (ref 25–40)
POTASSIUM REFLEX MAGNESIUM: 4.8 MMOL/L (ref 3.5–5.1)
PRO-BNP: ABNORMAL PG/ML (ref 0–124)
RBC # BLD: 2.96 M/UL (ref 4.2–5.9)
SALICYLATE, SERUM: <0.3 MG/DL (ref 15–30)
SODIUM BLD-SCNC: 128 MMOL/L (ref 136–145)
TCO2 CALC VENOUS: 29 MMOL/L
TOTAL CK: 45 U/L (ref 39–308)
TOTAL PROTEIN: 6.6 G/DL (ref 6.4–8.2)
TROPONIN: 0.11 NG/ML
WBC # BLD: 9.4 K/UL (ref 4–11)

## 2022-05-25 PROCEDURE — 93005 ELECTROCARDIOGRAM TRACING: CPT | Performed by: EMERGENCY MEDICINE

## 2022-05-25 PROCEDURE — 83880 ASSAY OF NATRIURETIC PEPTIDE: CPT

## 2022-05-25 PROCEDURE — 6360000002 HC RX W HCPCS: Performed by: EMERGENCY MEDICINE

## 2022-05-25 PROCEDURE — 2700000000 HC OXYGEN THERAPY PER DAY

## 2022-05-25 PROCEDURE — 82550 ASSAY OF CK (CPK): CPT

## 2022-05-25 PROCEDURE — 85025 COMPLETE CBC W/AUTO DIFF WBC: CPT

## 2022-05-25 PROCEDURE — 83690 ASSAY OF LIPASE: CPT

## 2022-05-25 PROCEDURE — 70450 CT HEAD/BRAIN W/O DYE: CPT

## 2022-05-25 PROCEDURE — 93010 ELECTROCARDIOGRAM REPORT: CPT | Performed by: INTERNAL MEDICINE

## 2022-05-25 PROCEDURE — 82803 BLOOD GASES ANY COMBINATION: CPT

## 2022-05-25 PROCEDURE — 84484 ASSAY OF TROPONIN QUANT: CPT

## 2022-05-25 PROCEDURE — 94761 N-INVAS EAR/PLS OXIMETRY MLT: CPT

## 2022-05-25 PROCEDURE — 80179 DRUG ASSAY SALICYLATE: CPT

## 2022-05-25 PROCEDURE — 80143 DRUG ASSAY ACETAMINOPHEN: CPT

## 2022-05-25 PROCEDURE — 82077 ASSAY SPEC XCP UR&BREATH IA: CPT

## 2022-05-25 PROCEDURE — 6370000000 HC RX 637 (ALT 250 FOR IP): Performed by: EMERGENCY MEDICINE

## 2022-05-25 PROCEDURE — 82140 ASSAY OF AMMONIA: CPT

## 2022-05-25 PROCEDURE — 94640 AIRWAY INHALATION TREATMENT: CPT

## 2022-05-25 PROCEDURE — 96374 THER/PROPH/DIAG INJ IV PUSH: CPT

## 2022-05-25 PROCEDURE — 71045 X-RAY EXAM CHEST 1 VIEW: CPT

## 2022-05-25 PROCEDURE — 80053 COMPREHEN METABOLIC PANEL: CPT

## 2022-05-25 PROCEDURE — 96375 TX/PRO/DX INJ NEW DRUG ADDON: CPT

## 2022-05-25 PROCEDURE — 99285 EMERGENCY DEPT VISIT HI MDM: CPT

## 2022-05-25 RX ORDER — IPRATROPIUM BROMIDE AND ALBUTEROL SULFATE 2.5; .5 MG/3ML; MG/3ML
1 SOLUTION RESPIRATORY (INHALATION) ONCE
Status: COMPLETED | OUTPATIENT
Start: 2022-05-25 | End: 2022-05-25

## 2022-05-25 RX ORDER — FENTANYL CITRATE 50 UG/ML
25 INJECTION, SOLUTION INTRAMUSCULAR; INTRAVENOUS ONCE
Status: COMPLETED | OUTPATIENT
Start: 2022-05-25 | End: 2022-05-25

## 2022-05-25 RX ORDER — ONDANSETRON 2 MG/ML
4 INJECTION INTRAMUSCULAR; INTRAVENOUS ONCE
Status: COMPLETED | OUTPATIENT
Start: 2022-05-25 | End: 2022-05-25

## 2022-05-25 RX ADMIN — IPRATROPIUM BROMIDE AND ALBUTEROL SULFATE 1 AMPULE: .5; 2.5 SOLUTION RESPIRATORY (INHALATION) at 03:20

## 2022-05-25 RX ADMIN — FENTANYL CITRATE 25 MCG: 50 INJECTION, SOLUTION INTRAMUSCULAR; INTRAVENOUS at 03:01

## 2022-05-25 RX ADMIN — ONDANSETRON 4 MG: 2 INJECTION INTRAMUSCULAR; INTRAVENOUS at 03:00

## 2022-05-25 ASSESSMENT — PAIN DESCRIPTION - LOCATION
LOCATION: BACK
LOCATION: BACK

## 2022-05-25 ASSESSMENT — PAIN - FUNCTIONAL ASSESSMENT: PAIN_FUNCTIONAL_ASSESSMENT: 0-10

## 2022-05-25 ASSESSMENT — PAIN SCALES - GENERAL
PAINLEVEL_OUTOF10: 9
PAINLEVEL_OUTOF10: 0
PAINLEVEL_OUTOF10: 9
PAINLEVEL_OUTOF10: 9

## 2022-05-25 ASSESSMENT — PAIN DESCRIPTION - ORIENTATION: ORIENTATION: LOWER

## 2022-05-25 ASSESSMENT — PAIN DESCRIPTION - DESCRIPTORS: DESCRIPTORS: ACHING

## 2022-05-25 ASSESSMENT — PAIN DESCRIPTION - PAIN TYPE: TYPE: CHRONIC PAIN

## 2022-05-25 NOTE — ED NOTES
Pt provided with urinal for urine sample, but states he cannot pee. Pt did not want straight cath at this time but states he will try. Will continue to monitor.       Aileen Coon RN  05/25/22 9854

## 2022-05-26 ASSESSMENT — ENCOUNTER SYMPTOMS
PHOTOPHOBIA: 0
ABDOMINAL PAIN: 0
VOMITING: 0
COLOR CHANGE: 0
WHEEZING: 0
SHORTNESS OF BREATH: 1
ABDOMINAL DISTENTION: 0
BACK PAIN: 0
COUGH: 1
CHEST TIGHTNESS: 0
RHINORRHEA: 0
NAUSEA: 0

## 2022-05-26 NOTE — ED PROVIDER NOTES
[address] [facility]  ED  [facility]      Pt Name: Angelina Mejia  MRN: [id number]  [place] 1970  Date ofevaluation: 5/25/2022  Provider: Abraham Solomon MD    CHIEF COMPLAINT       Chief Complaint   Patient presents with    Fatigue     pt states that this afternoon 2p and his wife thought he was more tired than normal, pt states that he is not able complete his thoughts. wears 3lnc of oxygen. Pt is a&o x4. HISTORY OF PRESENT ILLNESS   (Location/Symptom, Timing/Onset,Context/Setting, Quality, Duration, Modifying Factors, Severity)  Note limiting factors. Angelina Mejia is a 45 y.o. male  who  has a past medical history of Ambulatory dysfunction, Aortic stenosis, Arthritis, Asthma, Bilateral hilar adenopathy syndrome, CAD (coronary artery disease), Cardiomyopathy ([address] CHF (congestive heart failure) ([address] Chronic pain, COPD (chronic obstructive pulmonary disease) ([address] Depression, Diabetes mellitus ([address] Difficult intravenous access, Emphysema of lung ([address] ESRD (end stage renal disease) on dialysis ([address] Fear of needles, Gastric ulcer, GERD (gastroesophageal reflux disease), Heart valve problem, Hemodialysis patient ([address] History of spinal fracture, Hx of blood clots, Hyperlipidemia, Hypertension, MI (myocardial infarction) ([address] Neuromuscular disorder ([address] Numbness and tingling in left arm, Pneumonia, PONV (postoperative nausea and vomiting), Prolonged emergence from general anesthesia, Sleep apnea, Stroke ([address] TIA (transient ischemic attack), and Unspecified diseases of blood and blood-forming organs. who presents to the emergency department for evaluation of generalized fatigue and somnolence. Family reports that for the past few days but mostly over the past 3 days the patient has been more somnolent fatigued and forgetful than normal.  Patient has a complicated past medical history.   Family reports that the patient is not on percent compliant with home medications. He also stated is not frequently check his blood glucose. Patient reports that he has had increasing shortness of breath and has been fluctuating between 2 to 3 L at home as necessary. He does report exertional dyspnea. Denies orthopnea. He denies fevers. Patient reports a baseline cough which is at baseline. He reports intermittent paresthesias of the bilateral lower extremities. Denies weakness or focal deficits. Patient reports that he does not currently smoke. Denies any new or additional medications. Patient reports he does have a history of COPD and has been using his nebulizers and inhalers intermittently. Family member present states that the patient has not been using them very frequently further observation. HPI    NursingNotes were reviewed. REVIEW OF SYSTEMS    (2-9 systems for level 4, 10 or more for level 5)     Review of Systems   Constitutional: Positive for fatigue. Negative for activity change, chills and fever. HENT: Negative for congestion and rhinorrhea. Eyes: Negative for photophobia and visual disturbance. Respiratory: Positive for cough and shortness of breath. Negative for chest tightness and wheezing. Cardiovascular: Negative for chest pain, palpitations and leg swelling. Gastrointestinal: Negative for abdominal distention, abdominal pain, nausea and vomiting. Endocrine: Negative for polydipsia and polyuria. Genitourinary: Negative for difficulty urinating and frequency. Musculoskeletal: Negative for back pain, gait problem, neck pain and neck stiffness. Skin: Negative for color change, rash and wound. Neurological: Negative for dizziness, seizures, speech difficulty, weakness, light-headedness, numbness and headaches. Psychiatric/Behavioral: Negative for confusion. The patient is not nervous/anxious. All other systems reviewed and are negative.       Except as noted above the remainder of the review of systems was reviewed and negative.        PAST MEDICAL HISTORY     Past Medical History:   Diagnosis Date    Ambulatory dysfunction     walker for long distances, SOB with distance    Aortic stenosis     echo 2017    Arthritis     hands and hips    Asthma     Bilateral hilar adenopathy syndrome 6/3/2013    CAD (coronary artery disease)     Dr. Darlin Kennedy [facility]) 04/19/2019    EF= 43%    CHF (congestive heart failure) ([facility])     Chronic pain     COPD (chronic obstructive pulmonary disease) ([address]     pulmonology Dr. Rm Anis    Depression     Diabetes mellitus ([address]     borderline    Difficult intravenous access     Emphysema of lung ([address]     ESRD (end stage renal disease) on dialysis ([address]     MWF    Fear of needles     Gastric ulcer     GERD (gastroesophageal reflux disease)     Heart valve problem     bicuspic valve    Hemodialysis patient ([address]     History of spinal fracture     work incident    Hx of blood clots     Bilateral lower extremities; stents in place    Hyperlipidemia     Hypertension     MI (myocardial infarction) ([address] 2019    has had 9 MIs. 2019 was the last    Neuromuscular disorder ([address]     due to CVA    Numbness and tingling in left arm     from fistula    Pneumonia     PONV (postoperative nausea and vomiting)     Prolonged emergence from general anesthesia     States requires more medication than most people    Sleep apnea     Uses CPAP    Stroke ([address]     7mm thalamic cva 2017 deficts left side, left side weakness    TIA (transient ischemic attack)     Unspecified diseases of blood and blood-forming organs          SURGICALHISTORY       Past Surgical History:   Procedure Laterality Date    AORTIC VALVE REPLACEMENT N/A 10/15/2019    TRANSCATHETER AORTIC VALVE REPLACEMENT FEMORAL APPROACH performed by Muna Gao MD at [address] Right 7/2/2019    PERITONEAL DIALYSIS CATHETER REMOVAL performed by Raissa Mckeon MD at [place] [address]  COLONOSCOPY      COLONOSCOPY  2/29/2015    WNL    CORONARY ANGIOPLASTY WITH STENT PLACEMENT  05/26/15    CYST REMOVAL  08/14/2013    EXCISION CYSTS, NECK X2 AND ABDOMINAL benign    DIAGNOSTIC CARDIAC CATH LAB PROCEDURE      DIALYSIS FISTULA CREATION Left 10/30/2017    LEFT BRACHIAL CEPHALIC FISTULA    DIALYSIS FISTULA CREATION Left 3/27/2019    LIGATION  AV FISTULA performed by Agusto Richardson MD at [facility], COLON, DIAGNOSTIC      IR TUNNELED [address] 5 YEARS  3/21/2022    IR TUNNELED CATHETER PLACEMENT GREATER THAN 5 YEARS 3/21/2022 [facility] PROCEDURES    IR TUNNELED CATHETER PLACEMENT GREATER THAN 5 YEARS  4/21/2022    IR TUNNELED CATHETER PLACEMENT GREATER THAN 5 YEARS 4/21/2022 [facility] PROCEDURES    IR TUNNELED CATHETER PLACEMENT GREATER THAN 5 YEARS  4/26/2022    IR TUNNELED CATHETER PLACEMENT GREATER THAN 5 YEARS 4/26/2022 [facility] SPECIAL PROCEDURES    OTHER SURGICAL HISTORY  02/01/2017    laparoscopic cholecystectomy with intraoperative cholangiogram    OTHER SURGICAL HISTORY  2018    PORT PLACEMENT  - vas cath    OTHER SURGICAL HISTORY Bilateral 06/26/2018    laprascopic peritoneal dialysis catheter placement    OTHER SURGICAL HISTORY Right 09/2018    peritoneal dialysis port placed on right side of abdomen    OTHER SURGICAL HISTORY  05/28/2019    PTA/Stenting R External Iliac artery    ID LAP INSERTION TUNNELED INTRAPERITONEAL CATHETER N/A 9/21/2018    LAPAROSCOPIC PERITONEAL DIALYSIS CATHETER REPLACEMENT performed by Velma Ron MD at [address] [facility] N/A 2/24/2022    PERINEAL ABCESS DRAINAGE performed by Velma Ron MD at [facility] ENDOSCOPY  01/06/2016    UPPER GASTROINTESTINAL ENDOSCOPY  01/29/2017    possible candida, otherwise normal appearing    VASCULAR SURGERY  aprx 2 years ago    2 stents placed, each side of groin         CURRENT MEDICATIONS XL) 50 MG extended release tablet Take 1 tablet by mouth in the morning and at bedtime, Disp-60 tablet, R-1Normal      apixaban (ELIQUIS) 5 MG TABS tablet Take 1 tablet by mouth 2 times daily, Disp-60 tablet, R-1Normal      pravastatin (PRAVACHOL) 40 MG tablet Take 1 tablet by mouth daily, Disp-90 tablet, R-3Normal      insulin glargine (BASAGLAR KWIKPEN) 100 UNIT/ML injection pen Inject 30 Units into the skin nightly, Disp-15 mL, R-1Normal      Continuous Blood Gluc Sensor (DEXCOM G6 SENSOR) MISC Every 10 days, Disp-9 each, R-3Print      Continuous Blood Gluc Transmit (DEXCOM G6 TRANSMITTER) MISC 1 each by Does not apply route every 3 months, Disp-1 each, R-3Print      Continuous Blood Gluc  (DEXCOM G6 ) ADAM 1 each by Does not apply route Daily with lunch, Disp-1 each, R-0Print      B Complex-C-Folic Acid (VIRT-CAPS) 1 MG CAPS TAKE 1 CAPSULE BY MOUTH EVERY DAY, Disp-90 capsule, R-1Normal      Calcium Acetate, Phos Binder, 667 MG CAPS TAKE 1 CAPSULE BY MOUTH THREE TIMES DAILY WITH MEALS, Disp-90 capsule, R-3Normal      nitroGLYCERIN (NITROSTAT) 0.4 MG SL tablet DISSOLVE 1 TABLET UNDER THE TONGUE AS NEEDED FOR CHEST PAIN EVERY 5 MINUTES UP TO 3 TIMES. IF NO RELIEF CALL 911., Disp-25 tablet, R-10Normal      vitamin D (ERGOCALCIFEROL) 53703 units CAPS capsule TK 1 C PO WEEKLY, R-11Historical Med      Tiotropium Bromide-Olodaterol (STIOLTO RESPIMAT) 2.5-2.5 MCG/ACT AERS Inhale 2 puffs into the lungs daily, Disp-2 Inhaler, R-02 samples given:  Lot #[id number], Exp 7/21 & Lot #[id number], Exp 7/21NO PRINT      Blood Glucose Monitoring Suppl ADAM Disp-1 Device, R-0, NormalUSE AS DIRECTED.       Alcohol Swabs PADS Disp-300 each, R-3, NormalUSE AS DIRECTED      albuterol sulfate  (90 Base) MCG/ACT inhaler Inhale 2 puffs into the lungs every 6 hours as needed for Wheezing, Disp-1 Inhaler, R-3Print      ipratropium-albuterol (DUONEB) 0.5-2.5 (3) MG/3ML SOLN nebulizer solution Inhale 3 mLs into the lungs every 6 hours as needed for Shortness of Breath, Disp-360 mL, R-1Print      calcium carbonate (TUMS) 500 MG chewable tablet Take 1 tablet by mouth 3 times daily as needed for Heartburn. !! - Potential duplicate medications found. Please discuss with provider. Morphine    FAMILY HISTORY       Family History   Problem Relation Age of Onset    Diabetes Mother     Heart Disease Father     Kidney Disease Sister         stage 4-kidney failure    Cancer Sister     Heart Disease Sister     Obesity Sister     Cancer Sister     Heart Disease Sister     Obesity Sister     Alcohol Abuse Brother           SOCIAL HISTORY       Social History     Socioeconomic History    Marital status:      Spouse name: None    Number of children: None    Years of education: None    Highest education level: None   Occupational History    None   Tobacco Use    Smoking status: Former Smoker     Packs/day: 0.50     Years: 33.00     Pack years: 16.50     Types: Cigarettes     Quit date: 2020     Years since quittin.0    Smokeless tobacco: Never Used    Tobacco comment: [facility] quit 2021   Vaping Use    Vaping Use: Never used   Substance and Sexual Activity    Alcohol use: Not Currently     Alcohol/week: 0.0 standard drinks     Comment: occ    Drug use: No    Sexual activity: Yes     Partners: Female     Comment:    Other Topics Concern    None   Social History Narrative    None     Social Determinants of Health     Financial Resource Strain:     Difficulty of Paying Living Expenses: Not on file   Food Insecurity:     Worried About Running Out of Food in the Last Year: Not on file    Louise of Food in the Last Year: Not on file   Transportation Needs:     Lack of Transportation (Medical): Not on file    Lack of Transportation (Non-Medical):  Not on file   Physical Activity:     Days of Exercise per Week: Not on file    Minutes of Exercise per Session: Not on file   Stress:  Feeling of Stress : Not on file   Social Connections:     Frequency of Communication with Friends and Family: Not on file    Frequency of Social Gatherings with Friends and Family: Not on file    Attends Yazidi Services: Not on file    Active Member of Clubs or Organizations: Not on file    Attends Club or Organization Meetings: Not on file    Marital Status: Not on file   Intimate Partner Violence:     Fear of Current or Ex-Partner: Not on file    Emotionally Abused: Not on file    Physically Abused: Not on file    Sexually Abused: Not on file   Housing Stability:     Unable to Pay for Housing in the Last Year: Not on file    Number of [place] in the Last Year: Not on file    Unstable Housing in the Last Year: Not on file       SCREENINGS   NIH Stroke Scale  Interval: Baseline  Level of Consciousness (1a): Alert  LOC Questions (1b): Answers both correctly  LOC Commands (1c): Performs both tasks correctly  Best Gaze (2): Normal  Visual (3): No visual loss  Motor Arm, Left (5a): No drift  Motor Arm, Right (5b): No drift  Motor Leg, Left (6a): No drift  Motor Leg, Right (6b): No drift  Limb Ataxia (7): Absent  Sensory (8): Normal  Best Language (9): No aphasia  Extinction and Inattention (11): No abnormalityGlasgow Coma Scale  Eye Opening: Spontaneous  Best Verbal Response: Oriented  Best Motor Response: Obeys commands  Robert Coma Scale Score: 15        PHYSICAL EXAM    (up to 7 for level 4, 8 or more for level 5)     ED Triage Vitals [05/25/22 0041]   BP Temp Temp Source Heart Rate Resp SpO2 Height Weight   120/87 98.3 °F (36.8 °C) Temporal 67 14 97 % 5' 6\" (1.676 m) 245 lb (111.1 kg)       Physical Exam  Vitals and nursing note reviewed. Constitutional:       General: He is not in acute distress. Appearance: He is well-developed. HENT:      Head: Normocephalic and atraumatic. Mouth/Throat:      Mouth: Mucous membranes are moist.      Pharynx: Oropharynx is clear.    Eyes: Extraocular Movements: Extraocular movements intact. Conjunctiva/sclera: Conjunctivae normal.      Pupils: Pupils are equal, round, and reactive to light. Neck:      Trachea: No tracheal deviation. Cardiovascular:      Rate and Rhythm: Normal rate and regular rhythm. Pulmonary:      Effort: Pulmonary effort is normal.      Breath sounds: Wheezing present. No rhonchi or rales. Abdominal:      General: There is no distension. Palpations: Abdomen is soft. Tenderness: There is no abdominal tenderness. There is no guarding or rebound. Musculoskeletal:         General: No deformity. Normal range of motion. Cervical back: Normal range of motion. Skin:     General: Skin is warm and dry. Capillary Refill: Capillary refill takes less than 2 seconds. Neurological:      General: No focal deficit present. Mental Status: He is alert and oriented to person, place, and time. Cranial Nerves: No cranial nerve deficit. Sensory: No sensory deficit. Motor: No weakness. Gait: Gait normal.         RESULTS     EKG: All EKG's are interpreted by the Emergency Department Physician who either signs or Co-signsthis chart in the absence of a cardiologist.    EKG shows a sinus rhythm ventricular rate of 68 bpm.  TN interval and QTc interval within normal limits. Patient has normal axis. There are no significant ST elevations or depressions EKG is nondiagnostic for ACS. Daniel Montiel Compared EKG from 5/9/22 I do not appreciate significant change. Daniel Montiel RADIOLOGY:   Non-plain filmimages such as CT, Ultrasound and MRI are read by the radiologist. Plain radiographic images are visualized and preliminarily interpreted by the emergency physician with the below findings:        Interpretation per the Radiologist below, if available at the time ofthis note:    CT HEAD WO CONTRAST   Final Result   No acute intracranial abnormality. Otherwise stable CT.          XR CHEST PORTABLE   Final Result Stable cardiomegaly with slowly resolving central pulmonary congestion. Slowly resolving bibasilar opacities.                ED BEDSIDE ULTRASOUND:   Performed by ED Physician - none    LABS:  Labs Reviewed   CBC WITH AUTO DIFFERENTIAL - Abnormal; Notable for the following components:       Result Value    RBC 2.96 (*)     Hemoglobin 8.8 (*)     Hematocrit 26.9 (*)     RDW 16.4 (*)     All other components within normal limits   COMPREHENSIVE METABOLIC PANEL W/ REFLEX TO MG FOR LOW K - Abnormal; Notable for the following components:    Sodium 128 (*)     Chloride 87 (*)     Glucose 149 (*)     BUN 44 (*)     CREATININE 6.4 (*)     GFR Non- 9 (*)     GFR  11 (*)     Calcium 8.2 (*)     ALT <5 (*)     AST 10 (*)     All other components within normal limits    Narrative:     SANDIE Medina  [facility] tel. [phone],  Chemistry results called to and read back by NAVJOT Jones, 05/25/2022  03:08, by [facility]   TROPONIN - Abnormal; Notable for the following components:    Troponin 0.11 (*)     All other components within normal limits    Narrative:     Haley Carvalho  [facility] tel. [phone],  Chemistry results called to and read back by NAVJOT Jones, 05/25/2022  03:08, by [id number], VENOUS - Abnormal; Notable for the following components:    pH, Blade 7.317 (*)     pCO2, Blade 54.6 (*)     pO2, Blade 41.2 (*)     Carboxyhemoglobin 2.6 (*)     All other components within normal limits   BRAIN NATRIURETIC PEPTIDE - Abnormal; Notable for the following components:    Pro-BNP 51,922 (*)     All other components within normal limits    Narrative:     Rebeca Rivera tel. [phone],  Chemistry results called to and read back by NAVJOT Jones, 05/25/2022  03:08, [facility] [id number] [facility] - Abnormal; Notable for the following components:    Ammonia 14 (*)     All other components within normal limits   SALICYLATE LEVEL - Abnormal; Notable for the following components:    Salicylate, Serum <2.1 (*)     All other components within normal limits    Narrative:     Luz Arevalo tel. [phone],  Chemistry results called to and read back by NAVJOT Santiago, 05/25/2022  03:08, by [id number] [facility] - Abnormal; Notable for the following components:    Acetaminophen Level <5 (*)     All other components within normal limits    Narrative:     Luz Arevalo tel. [phone],  Chemistry results called to and read back by NAVJOT Santiago, 05/25/2022  03:08, by [address]    Narrative:     Jonathon Lema [phone],  Chemistry results called to and read back by NAVJOT Santiago, 05/25/2022  03:08, by [place]    Narrative:     Luz Irina tel. [phone],  Chemistry results called to and read back by NAVJOT Santiago, 05/25/2022  03:08, by [address]     Narrative:     Luz Arevalo tel. [phone],  Chemistry results called to and read back by NAVJOT Santiago, 05/25/2022  03:08, by [facility]       All other labs were within normal range or not returned as of this dictation. EMERGENCY DEPARTMENT COURSE and DIFFERENTIAL DIAGNOSIS/MDM:   Vitals:    Vitals:    05/25/22 0041 05/25/22 0300 05/25/22 0320   BP: 120/87 (!) 111/90    Pulse: 67 69    Resp: 14 21 18   Temp: 98.3 °F (36.8 °C)     TempSrc: Temporal     SpO2: 97%  93%   Weight: 245 lb (111.1 kg)     Height: 5' 6\" (1.676 m)         Patient was given thefollowing medications:  Medications   ondansetron (ZOFRAN) injection 4 mg (4 mg IntraVENous Given 5/25/22 0300)   fentaNYL (SUBLIMAZE) injection 25 mcg (25 mcg IntraVENous Given 5/25/22 0301)   ipratropium-albuterol (DUONEB) nebulizer solution 1 ampule (1 ampule Inhalation Given 5/25/22 0320)       ED COURSE & MEDICAL DECISION MAKING    Pertinent Labs & Imaging studies reviewed. (See chart for details)   -  Patient seen and evaluated in the emergency department. -  Triage and nursing notes reviewed and incorporated.   -  Old chart records reviewed and incorporated. -  Differential diagnosis includes: Differential Diagnosis:    Hypoxemia/ischemic encephalopathy, hepatic encephalopathy   Seizure or postictal state   Alterations of glucose such as hypoglycemia and hyperglycemia    Alterations in perfusions such as hypotension and hypoperfusion    Alterations in electrolytes such as disturbances in sodium or calcium   Infectious processes such as sepsis from a pneumonia or urinary tract infection    Substance use or withdrawal, especially alcohol and drugs    Medication adverse event or interaction    Vitamin deficiencies such as Wernicke's encephalopathy    CNS lesion, injury, infection (CVA, subdural hematoma, meningitis, encephalitis)    Alterations in hormones such as thyroid or adrenal abnormalities    Alterations in cardiac functioning such as arrhythmia, MI or CHF    Alteration in temperature such as hyperthermia or hypothermia    Dehydration, sleep deprivation   Change in medical regimen    Alteration in lifestyle, environment, or personal relationships    -  Work-up included:  See above  -  ED treatment included: See above  -  Results discussed with patient. Patient presents ED for evaluation of generalized fatigue somnolence and some confusion over the past few days. No reports of any focal neurological deficits. No reports of recent infectious symptoms. On exam cranial nerves II through XII are grossly intact. Patient ANO x4. Strength and sensation intact in all extremities. Labs show no emergent laboratory normalities. VBG does demonstrate hypercapnia which appears to be higher than has been previously. Chest x-ray without acute cardiopulmonary disease and appears to be improved compared to previous. CT of the head without any emergent findings. Troponin within normal limits. Patient feels improved on reevaluation. Results discussed with patient and family and they are comfortable with discharge home.   Patient encouraged to use inhalers on a every 4 to sick schedule. I would not start steroids at the patient's history of diabetes and he is encouraged to follow-up with pulmonologist.  Patient feels improved on reevaluation. Symptomatic treatment with expectant management discussed with the patient and they and/or family members present are amenable to treatment plan and outpatient follow-up. Strict return precautions were discussed with the patient and those present. They demonstrated understanding of when to return to the emergency department for new or worsening symptoms. .  The patient is agreeable with plan of care and disposition. REASSESSMENT          CRITICAL CARE TIME   Total Critical Care time was 30 minutes, excluding separately reportable procedures. There was a high probability of clinically significant/life threatening deterioration in the patient's condition which required my urgent intervention. CONSULTS:  None    PROCEDURES:  Unless otherwise noted below, none     Procedures    FINAL IMPRESSION      1. Hypercapnia    2.  Fatigue, unspecified type          DISPOSITION/PLAN   DISPOSITION Decision To Discharge 05/25/2022 04:07:18 AM      PATIENT REFERREDTO:  Pattie Dumont MD  [facility] [id number]  [phone]    Schedule an appointment as soon as possible for a visit   As needed      DISCHARGEMEDICATIONS:  Discharge Medication List as of 5/25/2022  4:11 AM             (Please note that portions of this note were completed with a voice recognition program.  Efforts were made to edit the dictations but occasionally words are mis-transcribed.)    Candace Sosa MD (electronically signed)  Attending Emergency Physician          Candace Sosa MD  05/26/22 5118

## 2022-05-29 ENCOUNTER — APPOINTMENT (OUTPATIENT)
Dept: CT IMAGING | Age: 52
DRG: 871 | End: 2022-05-29
Payer: COMMERCIAL

## 2022-05-29 ENCOUNTER — APPOINTMENT (OUTPATIENT)
Dept: GENERAL RADIOLOGY | Age: 52
DRG: 871 | End: 2022-05-29
Payer: COMMERCIAL

## 2022-05-29 ENCOUNTER — HOSPITAL ENCOUNTER (INPATIENT)
Age: 52
LOS: 10 days | Discharge: HOME | DRG: 871 | End: 2022-06-08
Attending: EMERGENCY MEDICINE | Admitting: INTERNAL MEDICINE
Payer: COMMERCIAL

## 2022-05-29 DIAGNOSIS — R41.82 ALTERED MENTAL STATUS, UNSPECIFIED ALTERED MENTAL STATUS TYPE: ICD-10-CM

## 2022-05-29 DIAGNOSIS — R06.02 SHORTNESS OF BREATH: Primary | ICD-10-CM

## 2022-05-29 DIAGNOSIS — J96.21 ACUTE ON CHRONIC RESPIRATORY FAILURE WITH HYPOXIA (HCC): ICD-10-CM

## 2022-05-29 DIAGNOSIS — E16.2 HYPOGLYCEMIA: ICD-10-CM

## 2022-05-29 DIAGNOSIS — R59.0 MEDIASTINAL ADENOPATHY: ICD-10-CM

## 2022-05-29 LAB
A/G RATIO: 1.5 (ref 1.1–2.2)
ALBUMIN SERPL-MCNC: 4.3 G/DL (ref 3.4–5)
ALP BLD-CCNC: 100 U/L (ref 40–129)
ALT SERPL-CCNC: 10 U/L (ref 10–40)
ANION GAP SERPL CALCULATED.3IONS-SCNC: 22 MMOL/L (ref 3–16)
APTT: 30.4 SEC (ref 23–34.3)
AST SERPL-CCNC: 13 U/L (ref 15–37)
BACTERIA: ABNORMAL /HPF
BASE EXCESS VENOUS: -1.3 MMOL/L (ref -3–3)
BASOPHILS ABSOLUTE: 0 K/UL (ref 0–0.2)
BASOPHILS RELATIVE PERCENT: 0.3 %
BILIRUB SERPL-MCNC: 0.3 MG/DL (ref 0–1)
BILIRUBIN URINE: NEGATIVE
BLOOD, URINE: ABNORMAL
BUN BLDV-MCNC: 63 MG/DL (ref 7–20)
CALCIUM SERPL-MCNC: 8.5 MG/DL (ref 8.3–10.6)
CARBOXYHEMOGLOBIN: 2.3 % (ref 0–1.5)
CHLORIDE BLD-SCNC: 87 MMOL/L (ref 99–110)
CLARITY: CLEAR
CO2: 24 MMOL/L (ref 21–32)
COLOR: YELLOW
CREAT SERPL-MCNC: 8 MG/DL (ref 0.9–1.3)
EKG ATRIAL RATE: 94 BPM
EKG DIAGNOSIS: NORMAL
EKG P AXIS: 84 DEGREES
EKG P-R INTERVAL: 160 MS
EKG Q-T INTERVAL: 396 MS
EKG QRS DURATION: 96 MS
EKG QTC CALCULATION (BAZETT): 495 MS
EKG R AXIS: 48 DEGREES
EKG T AXIS: 43 DEGREES
EKG VENTRICULAR RATE: 94 BPM
EOSINOPHILS ABSOLUTE: 0.1 K/UL (ref 0–0.6)
EOSINOPHILS RELATIVE PERCENT: 1.1 %
EPITHELIAL CELLS, UA: ABNORMAL /HPF (ref 0–5)
GFR AFRICAN AMERICAN: 9
GFR NON-AFRICAN AMERICAN: 7
GLUCOSE BLD-MCNC: 101 MG/DL (ref 70–99)
GLUCOSE BLD-MCNC: 125 MG/DL (ref 70–99)
GLUCOSE BLD-MCNC: 125 MG/DL (ref 70–99)
GLUCOSE BLD-MCNC: 147 MG/DL (ref 70–99)
GLUCOSE BLD-MCNC: 170 MG/DL (ref 70–99)
GLUCOSE BLD-MCNC: 51 MG/DL
GLUCOSE BLD-MCNC: 51 MG/DL (ref 70–99)
GLUCOSE BLD-MCNC: 59 MG/DL (ref 70–99)
GLUCOSE BLD-MCNC: 59 MG/DL (ref 70–99)
GLUCOSE URINE: 100 MG/DL
HCO3 VENOUS: 25.5 MMOL/L (ref 23–29)
HCT VFR BLD CALC: 29.9 % (ref 40.5–52.5)
HEMOGLOBIN: 9.8 G/DL (ref 13.5–17.5)
INR BLD: 1.21 (ref 0.87–1.14)
KETONES, URINE: ABNORMAL MG/DL
LACTIC ACID, SEPSIS: 0.8 MMOL/L (ref 0.4–1.9)
LEUKOCYTE ESTERASE, URINE: NEGATIVE
LYMPHOCYTES ABSOLUTE: 0.4 K/UL (ref 1–5.1)
LYMPHOCYTES RELATIVE PERCENT: 3.3 %
MCH RBC QN AUTO: 29.4 PG (ref 26–34)
MCHC RBC AUTO-ENTMCNC: 32.7 G/DL (ref 31–36)
MCV RBC AUTO: 90 FL (ref 80–100)
METHEMOGLOBIN VENOUS: 0.6 %
MICROSCOPIC EXAMINATION: YES
MONOCYTES ABSOLUTE: 0.6 K/UL (ref 0–1.3)
MONOCYTES RELATIVE PERCENT: 4.6 %
NEUTROPHILS ABSOLUTE: 12 K/UL (ref 1.7–7.7)
NEUTROPHILS RELATIVE PERCENT: 90.7 %
NITRITE, URINE: NEGATIVE
O2 SAT, VEN: 63 %
O2 THERAPY: ABNORMAL
PCO2, VEN: 52.6 MMHG (ref 40–50)
PDW BLD-RTO: 16.5 % (ref 12.4–15.4)
PERFORMED ON: ABNORMAL
PH UA: 7.5 (ref 5–8)
PH VENOUS: 7.3 (ref 7.35–7.45)
PLATELET # BLD: 309 K/UL (ref 135–450)
PMV BLD AUTO: 7.8 FL (ref 5–10.5)
PO2, VEN: 36.5 MMHG (ref 25–40)
POTASSIUM REFLEX MAGNESIUM: 4.5 MMOL/L (ref 3.5–5.1)
PRO-BNP: ABNORMAL PG/ML (ref 0–124)
PROCALCITONIN: 0.73 NG/ML (ref 0–0.15)
PROTEIN UA: >=300 MG/DL
PROTHROMBIN TIME: 15.1 SEC (ref 11.7–14.5)
RAPID INFLUENZA  B AGN: NEGATIVE
RAPID INFLUENZA A AGN: NEGATIVE
RBC # BLD: 3.32 M/UL (ref 4.2–5.9)
RBC UA: ABNORMAL /HPF (ref 0–4)
SARS-COV-2, NAAT: NOT DETECTED
SODIUM BLD-SCNC: 133 MMOL/L (ref 136–145)
SPECIFIC GRAVITY UA: 1.01 (ref 1–1.03)
SPECIMEN STATUS: NORMAL
TCO2 CALC VENOUS: 27 MMOL/L
TOTAL PROTEIN: 7.1 G/DL (ref 6.4–8.2)
TROPONIN: 0.1 NG/ML
TROPONIN: 0.12 NG/ML
URINE REFLEX TO CULTURE: YES
URINE TYPE: ABNORMAL
UROBILINOGEN, URINE: 0.2 E.U./DL
WBC # BLD: 13.3 K/UL (ref 4–11)
WBC UA: ABNORMAL /HPF (ref 0–5)

## 2022-05-29 PROCEDURE — 87040 BLOOD CULTURE FOR BACTERIA: CPT

## 2022-05-29 PROCEDURE — 82803 BLOOD GASES ANY COMBINATION: CPT

## 2022-05-29 PROCEDURE — 6360000004 HC RX CONTRAST MEDICATION: Performed by: PHYSICIAN ASSISTANT

## 2022-05-29 PROCEDURE — 6370000000 HC RX 637 (ALT 250 FOR IP): Performed by: INTERNAL MEDICINE

## 2022-05-29 PROCEDURE — 84484 ASSAY OF TROPONIN QUANT: CPT

## 2022-05-29 PROCEDURE — 84145 PROCALCITONIN (PCT): CPT

## 2022-05-29 PROCEDURE — 94660 CPAP INITIATION&MGMT: CPT

## 2022-05-29 PROCEDURE — 1200000000 HC SEMI PRIVATE

## 2022-05-29 PROCEDURE — 85610 PROTHROMBIN TIME: CPT

## 2022-05-29 PROCEDURE — 87804 INFLUENZA ASSAY W/OPTIC: CPT

## 2022-05-29 PROCEDURE — 96365 THER/PROPH/DIAG IV INF INIT: CPT

## 2022-05-29 PROCEDURE — 6360000002 HC RX W HCPCS: Performed by: PHYSICIAN ASSISTANT

## 2022-05-29 PROCEDURE — 87635 SARS-COV-2 COVID-19 AMP PRB: CPT

## 2022-05-29 PROCEDURE — 85025 COMPLETE CBC W/AUTO DIFF WBC: CPT

## 2022-05-29 PROCEDURE — 94761 N-INVAS EAR/PLS OXIMETRY MLT: CPT

## 2022-05-29 PROCEDURE — 96375 TX/PRO/DX INJ NEW DRUG ADDON: CPT

## 2022-05-29 PROCEDURE — 87086 URINE CULTURE/COLONY COUNT: CPT

## 2022-05-29 PROCEDURE — 6360000002 HC RX W HCPCS: Performed by: INTERNAL MEDICINE

## 2022-05-29 PROCEDURE — 83880 ASSAY OF NATRIURETIC PEPTIDE: CPT

## 2022-05-29 PROCEDURE — 81001 URINALYSIS AUTO W/SCOPE: CPT

## 2022-05-29 PROCEDURE — 2580000003 HC RX 258: Performed by: PHYSICIAN ASSISTANT

## 2022-05-29 PROCEDURE — 2700000000 HC OXYGEN THERAPY PER DAY

## 2022-05-29 PROCEDURE — 36415 COLL VENOUS BLD VENIPUNCTURE: CPT

## 2022-05-29 PROCEDURE — 71045 X-RAY EXAM CHEST 1 VIEW: CPT

## 2022-05-29 PROCEDURE — 93010 ELECTROCARDIOGRAM REPORT: CPT | Performed by: INTERNAL MEDICINE

## 2022-05-29 PROCEDURE — 80053 COMPREHEN METABOLIC PANEL: CPT

## 2022-05-29 PROCEDURE — 93005 ELECTROCARDIOGRAM TRACING: CPT | Performed by: EMERGENCY MEDICINE

## 2022-05-29 PROCEDURE — 99285 EMERGENCY DEPT VISIT HI MDM: CPT

## 2022-05-29 PROCEDURE — 6370000000 HC RX 637 (ALT 250 FOR IP): Performed by: PHYSICIAN ASSISTANT

## 2022-05-29 PROCEDURE — 70450 CT HEAD/BRAIN W/O DYE: CPT

## 2022-05-29 PROCEDURE — 83605 ASSAY OF LACTIC ACID: CPT

## 2022-05-29 PROCEDURE — 36569 INSJ PICC 5 YR+ W/O IMAGING: CPT

## 2022-05-29 PROCEDURE — 85730 THROMBOPLASTIN TIME PARTIAL: CPT

## 2022-05-29 PROCEDURE — 71260 CT THORAX DX C+: CPT

## 2022-05-29 PROCEDURE — 2580000003 HC RX 258: Performed by: INTERNAL MEDICINE

## 2022-05-29 RX ORDER — CALCIUM CARBONATE 200(500)MG
1 TABLET,CHEWABLE ORAL 3 TIMES DAILY PRN
Status: DISCONTINUED | OUTPATIENT
Start: 2022-05-29 | End: 2022-06-08 | Stop reason: HOSPADM

## 2022-05-29 RX ORDER — CALCIUM ACETATE 667 MG/1
1 CAPSULE ORAL
Status: DISCONTINUED | OUTPATIENT
Start: 2022-05-29 | End: 2022-06-08 | Stop reason: HOSPADM

## 2022-05-29 RX ORDER — OXYCODONE AND ACETAMINOPHEN 7.5; 325 MG/1; MG/1
1 TABLET ORAL EVERY 6 HOURS PRN
Status: DISCONTINUED | OUTPATIENT
Start: 2022-05-29 | End: 2022-06-08

## 2022-05-29 RX ORDER — HYDROXYZINE HYDROCHLORIDE 10 MG/1
10 TABLET, FILM COATED ORAL 3 TIMES DAILY PRN
Status: DISCONTINUED | OUTPATIENT
Start: 2022-05-29 | End: 2022-06-08 | Stop reason: HOSPADM

## 2022-05-29 RX ORDER — DEXTROSE MONOHYDRATE 50 MG/ML
100 INJECTION, SOLUTION INTRAVENOUS PRN
Status: DISCONTINUED | OUTPATIENT
Start: 2022-05-29 | End: 2022-05-30 | Stop reason: SDUPTHER

## 2022-05-29 RX ORDER — SODIUM CHLORIDE 0.9 % (FLUSH) 0.9 %
5-40 SYRINGE (ML) INJECTION EVERY 12 HOURS SCHEDULED
Status: DISCONTINUED | OUTPATIENT
Start: 2022-05-29 | End: 2022-06-08 | Stop reason: HOSPADM

## 2022-05-29 RX ORDER — DEXTROSE MONOHYDRATE 100 MG/ML
1000 INJECTION, SOLUTION INTRAVENOUS CONTINUOUS
Status: DISCONTINUED | OUTPATIENT
Start: 2022-05-29 | End: 2022-05-29

## 2022-05-29 RX ORDER — NITROGLYCERIN 0.4 MG/1
0.4 TABLET SUBLINGUAL EVERY 5 MIN PRN
Status: DISCONTINUED | OUTPATIENT
Start: 2022-05-29 | End: 2022-06-08 | Stop reason: HOSPADM

## 2022-05-29 RX ORDER — ONDANSETRON 2 MG/ML
4 INJECTION INTRAMUSCULAR; INTRAVENOUS EVERY 6 HOURS PRN
Status: DISCONTINUED | OUTPATIENT
Start: 2022-05-29 | End: 2022-06-08 | Stop reason: HOSPADM

## 2022-05-29 RX ORDER — MAGNESIUM CARB/ALUMINUM HYDROX 105-160MG
30 TABLET,CHEWABLE ORAL DAILY PRN
Status: DISCONTINUED | OUTPATIENT
Start: 2022-05-29 | End: 2022-06-08 | Stop reason: HOSPADM

## 2022-05-29 RX ORDER — DOCUSATE SODIUM 100 MG/1
100 CAPSULE, LIQUID FILLED ORAL DAILY
Status: DISCONTINUED | OUTPATIENT
Start: 2022-05-29 | End: 2022-06-08 | Stop reason: HOSPADM

## 2022-05-29 RX ORDER — INSULIN GLARGINE 100 [IU]/ML
15 INJECTION, SOLUTION SUBCUTANEOUS NIGHTLY
Status: DISCONTINUED | OUTPATIENT
Start: 2022-05-30 | End: 2022-05-30

## 2022-05-29 RX ORDER — OXYCODONE HYDROCHLORIDE AND ACETAMINOPHEN 5; 325 MG/1; MG/1
1 TABLET ORAL ONCE
Status: COMPLETED | OUTPATIENT
Start: 2022-05-29 | End: 2022-05-29

## 2022-05-29 RX ORDER — IPRATROPIUM BROMIDE AND ALBUTEROL SULFATE 2.5; .5 MG/3ML; MG/3ML
1 SOLUTION RESPIRATORY (INHALATION) EVERY 6 HOURS PRN
Status: DISCONTINUED | OUTPATIENT
Start: 2022-05-29 | End: 2022-06-08 | Stop reason: HOSPADM

## 2022-05-29 RX ORDER — METOPROLOL SUCCINATE 50 MG/1
50 TABLET, EXTENDED RELEASE ORAL 2 TIMES DAILY
Status: DISCONTINUED | OUTPATIENT
Start: 2022-05-29 | End: 2022-06-08 | Stop reason: HOSPADM

## 2022-05-29 RX ORDER — ACETAMINOPHEN 650 MG/1
650 SUPPOSITORY RECTAL EVERY 6 HOURS PRN
Status: DISCONTINUED | OUTPATIENT
Start: 2022-05-29 | End: 2022-06-08 | Stop reason: HOSPADM

## 2022-05-29 RX ORDER — ALBUTEROL SULFATE 90 UG/1
2 AEROSOL, METERED RESPIRATORY (INHALATION) EVERY 6 HOURS PRN
Status: DISCONTINUED | OUTPATIENT
Start: 2022-05-29 | End: 2022-06-08 | Stop reason: HOSPADM

## 2022-05-29 RX ORDER — SODIUM CHLORIDE 0.9 % (FLUSH) 0.9 %
5-40 SYRINGE (ML) INJECTION EVERY 12 HOURS SCHEDULED
Status: DISCONTINUED | OUTPATIENT
Start: 2022-05-29 | End: 2022-05-29 | Stop reason: SDUPTHER

## 2022-05-29 RX ORDER — ONDANSETRON 4 MG/1
4 TABLET, ORALLY DISINTEGRATING ORAL EVERY 8 HOURS PRN
Status: DISCONTINUED | OUTPATIENT
Start: 2022-05-29 | End: 2022-06-08 | Stop reason: HOSPADM

## 2022-05-29 RX ORDER — SODIUM CHLORIDE 0.9 % (FLUSH) 0.9 %
5-40 SYRINGE (ML) INJECTION PRN
Status: DISCONTINUED | OUTPATIENT
Start: 2022-05-29 | End: 2022-05-29 | Stop reason: SDUPTHER

## 2022-05-29 RX ORDER — SENNA AND DOCUSATE SODIUM 50; 8.6 MG/1; MG/1
1 TABLET, FILM COATED ORAL DAILY
Status: DISCONTINUED | OUTPATIENT
Start: 2022-05-29 | End: 2022-06-08 | Stop reason: HOSPADM

## 2022-05-29 RX ORDER — LIDOCAINE HYDROCHLORIDE 10 MG/ML
5 INJECTION, SOLUTION INFILTRATION; PERINEURAL ONCE
Status: DISCONTINUED | OUTPATIENT
Start: 2022-05-29 | End: 2022-05-29

## 2022-05-29 RX ORDER — PRAVASTATIN SODIUM 40 MG
40 TABLET ORAL NIGHTLY
Status: DISCONTINUED | OUTPATIENT
Start: 2022-05-29 | End: 2022-06-08 | Stop reason: HOSPADM

## 2022-05-29 RX ORDER — SODIUM CHLORIDE 0.9 % (FLUSH) 0.9 %
5-40 SYRINGE (ML) INJECTION PRN
Status: DISCONTINUED | OUTPATIENT
Start: 2022-05-29 | End: 2022-06-08 | Stop reason: HOSPADM

## 2022-05-29 RX ORDER — TRAZODONE HYDROCHLORIDE 50 MG/1
50 TABLET ORAL NIGHTLY PRN
Status: DISCONTINUED | OUTPATIENT
Start: 2022-05-29 | End: 2022-06-08 | Stop reason: HOSPADM

## 2022-05-29 RX ORDER — SODIUM CHLORIDE 9 MG/ML
25 INJECTION, SOLUTION INTRAVENOUS PRN
Status: DISCONTINUED | OUTPATIENT
Start: 2022-05-29 | End: 2022-05-29 | Stop reason: SDUPTHER

## 2022-05-29 RX ORDER — ACETAMINOPHEN 325 MG/1
650 TABLET ORAL EVERY 6 HOURS PRN
Status: DISCONTINUED | OUTPATIENT
Start: 2022-05-29 | End: 2022-06-07

## 2022-05-29 RX ORDER — DEXAMETHASONE SODIUM PHOSPHATE 10 MG/ML
6 INJECTION INTRAMUSCULAR; INTRAVENOUS ONCE
Status: COMPLETED | OUTPATIENT
Start: 2022-05-29 | End: 2022-05-29

## 2022-05-29 RX ORDER — PANTOPRAZOLE SODIUM 40 MG/1
40 TABLET, DELAYED RELEASE ORAL
Status: DISCONTINUED | OUTPATIENT
Start: 2022-05-30 | End: 2022-06-08 | Stop reason: HOSPADM

## 2022-05-29 RX ORDER — SODIUM CHLORIDE 9 MG/ML
INJECTION, SOLUTION INTRAVENOUS PRN
Status: DISCONTINUED | OUTPATIENT
Start: 2022-05-29 | End: 2022-06-08 | Stop reason: HOSPADM

## 2022-05-29 RX ORDER — IPRATROPIUM BROMIDE AND ALBUTEROL SULFATE 2.5; .5 MG/3ML; MG/3ML
1 SOLUTION RESPIRATORY (INHALATION) ONCE
Status: COMPLETED | OUTPATIENT
Start: 2022-05-29 | End: 2022-05-29

## 2022-05-29 RX ORDER — QUETIAPINE FUMARATE 25 MG/1
25 TABLET, FILM COATED ORAL NIGHTLY
Status: DISCONTINUED | OUTPATIENT
Start: 2022-05-29 | End: 2022-06-08 | Stop reason: HOSPADM

## 2022-05-29 RX ORDER — ISOSORBIDE DINITRATE 10 MG/1
10 TABLET ORAL 3 TIMES DAILY
Status: DISCONTINUED | OUTPATIENT
Start: 2022-05-29 | End: 2022-06-04

## 2022-05-29 RX ORDER — POLYETHYLENE GLYCOL 3350 17 G/17G
17 POWDER, FOR SOLUTION ORAL DAILY PRN
Status: DISCONTINUED | OUTPATIENT
Start: 2022-05-29 | End: 2022-06-08 | Stop reason: HOSPADM

## 2022-05-29 RX ORDER — CLOPIDOGREL BISULFATE 75 MG/1
75 TABLET ORAL DAILY
Status: DISCONTINUED | OUTPATIENT
Start: 2022-05-29 | End: 2022-06-08 | Stop reason: HOSPADM

## 2022-05-29 RX ORDER — CYCLOBENZAPRINE HCL 10 MG
10 TABLET ORAL 3 TIMES DAILY PRN
Status: DISCONTINUED | OUTPATIENT
Start: 2022-05-29 | End: 2022-06-08 | Stop reason: HOSPADM

## 2022-05-29 RX ORDER — CHOLECALCIFEROL (VITAMIN D3) 10 MCG
1 TABLET ORAL DAILY
Status: DISCONTINUED | OUTPATIENT
Start: 2022-05-30 | End: 2022-06-08 | Stop reason: HOSPADM

## 2022-05-29 RX ORDER — DULOXETIN HYDROCHLORIDE 30 MG/1
30 CAPSULE, DELAYED RELEASE ORAL DAILY
Status: DISCONTINUED | OUTPATIENT
Start: 2022-05-30 | End: 2022-06-08 | Stop reason: HOSPADM

## 2022-05-29 RX ADMIN — METOPROLOL SUCCINATE 50 MG: 50 TABLET, EXTENDED RELEASE ORAL at 20:22

## 2022-05-29 RX ADMIN — Medication 1500 MG: at 20:01

## 2022-05-29 RX ADMIN — ISOSORBIDE DINITRATE 10 MG: 10 TABLET ORAL at 20:23

## 2022-05-29 RX ADMIN — DEXAMETHASONE SODIUM PHOSPHATE 6 MG: 10 INJECTION INTRAMUSCULAR; INTRAVENOUS at 11:37

## 2022-05-29 RX ADMIN — IPRATROPIUM BROMIDE AND ALBUTEROL SULFATE 1 AMPULE: .5; 2.5 SOLUTION RESPIRATORY (INHALATION) at 11:03

## 2022-05-29 RX ADMIN — DEXTROSE MONOHYDRATE 1000 ML: 100 INJECTION, SOLUTION INTRAVENOUS at 11:34

## 2022-05-29 RX ADMIN — PIPERACILLIN AND TAZOBACTAM 3375 MG: 3; .375 INJECTION, POWDER, LYOPHILIZED, FOR SOLUTION INTRAVENOUS at 16:17

## 2022-05-29 RX ADMIN — APIXABAN 5 MG: 5 TABLET, FILM COATED ORAL at 20:34

## 2022-05-29 RX ADMIN — DEXTROSE MONOHYDRATE: 50 INJECTION, SOLUTION INTRAVENOUS at 16:26

## 2022-05-29 RX ADMIN — OXYCODONE AND ACETAMINOPHEN 1 TABLET: 5; 325 TABLET ORAL at 11:46

## 2022-05-29 RX ADMIN — SODIUM CHLORIDE, PRESERVATIVE FREE 10 ML: 5 INJECTION INTRAVENOUS at 20:25

## 2022-05-29 RX ADMIN — IOPAMIDOL 75 ML: 755 INJECTION, SOLUTION INTRAVENOUS at 10:05

## 2022-05-29 RX ADMIN — OXYCODONE AND ACETAMINOPHEN 1 TABLET: 7.5; 325 TABLET ORAL at 20:33

## 2022-05-29 RX ADMIN — PRAVASTATIN SODIUM 40 MG: 40 TABLET ORAL at 20:22

## 2022-05-29 RX ADMIN — QUETIAPINE FUMARATE 25 MG: 25 TABLET ORAL at 20:23

## 2022-05-29 ASSESSMENT — ENCOUNTER SYMPTOMS
ABDOMINAL PAIN: 0
VOMITING: 0
SORE THROAT: 0
CHEST TIGHTNESS: 1
EYE REDNESS: 0
DIARRHEA: 0
SINUS PAIN: 0
CONSTIPATION: 0
SHORTNESS OF BREATH: 1
RHINORRHEA: 0
SINUS PRESSURE: 0
COUGH: 0
EYE DISCHARGE: 0
NAUSEA: 0

## 2022-05-29 ASSESSMENT — PAIN SCALES - GENERAL
PAINLEVEL_OUTOF10: 8
PAINLEVEL_OUTOF10: 8
PAINLEVEL_OUTOF10: 10
PAINLEVEL_OUTOF10: 5

## 2022-05-29 ASSESSMENT — PAIN DESCRIPTION - LOCATION
LOCATION: BACK

## 2022-05-29 ASSESSMENT — PAIN DESCRIPTION - DESCRIPTORS: DESCRIPTORS: ACHING

## 2022-05-29 ASSESSMENT — PAIN DESCRIPTION - FREQUENCY: FREQUENCY: CONTINUOUS

## 2022-05-29 ASSESSMENT — PAIN DESCRIPTION - ORIENTATION
ORIENTATION: LOWER
ORIENTATION: LOWER;MID

## 2022-05-29 ASSESSMENT — PAIN - FUNCTIONAL ASSESSMENT: PAIN_FUNCTIONAL_ASSESSMENT: 0-10

## 2022-05-29 ASSESSMENT — PAIN DESCRIPTION - PAIN TYPE
TYPE: CHRONIC PAIN
TYPE: CHRONIC PAIN

## 2022-05-29 ASSESSMENT — PAIN DESCRIPTION - ONSET: ONSET: ON-GOING

## 2022-05-29 NOTE — H&P
Hospital Medicine History & Physical      PCP: Ji Harrell MD    Date of Admission: 5/29/2022    Date of Service: Pt seen/examined on 5/29/22 and Admitted to Inpatient with expected LOS greater than two midnights due to medical therapy. Chief Complaint:  I wasn't myself      History Of Present Illness:      45 y.o. male  with multiple medical issueswho presented to [facility] with confusion/sob/chest pain. Pt states he has chronic sob and chest pain(has element of anxiety) and yesterday he developed inc'd sob that was fairly sudden onset and continued into the night. He also noted ongoing substernal chest pain, nonradiating that would last a few hours(would subside on its own with deep breathing).   No n/v/diarrhea but did note subjective fevers/chills at home.  -he is on 3L at home chronically  - he did not miss HD this week and has been med compliant  -he noted some diaphoresis yesterday and today  -he notes dec'd appetite in the past week but has not been checking his sugars(broke his glucometer)  -apparently was behaving aggressively/growling at his wife and appeared confused, so EMS was called.  -he states he quit smoking 1 month ago    ER course: sugars noted in the 46s, started d10 ggt, pt ao x 3, coherent currently and not agitated    Past Medical History:          Diagnosis Date    Ambulatory dysfunction     walker for long distances, SOB with distance    Aortic stenosis     echo 2017    Arthritis     hands and hips    Asthma     Bilateral hilar adenopathy syndrome 6/3/2013    CAD (coronary artery disease)     Dr. [facility]) 04/19/2019    EF= 43%    CHF (congestive heart failure) ([address]     Chronic pain     COPD (chronic obstructive pulmonary disease) ([address]     pulmonology Dr. Candelaria Arm    Depression     Diabetes mellitus ([address]     borderline    Difficult intravenous access     Emphysema of lung ([address]     ESRD (end stage renal disease) on dialysis ([facility]     MWF    Fear of needles     Gastric ulcer     GERD (gastroesophageal reflux disease)     Heart valve problem     bicuspic valve    Hemodialysis patient ([facility]     History of spinal fracture     work incident    Hx of blood clots     Bilateral lower extremities; stents in place    Hyperlipidemia     Hypertension     MI (myocardial infarction) ([facility] 2019    has had 9 MIs. 2019 was the last    Neuromuscular disorder ([facility]     due to CVA    Numbness and tingling in left arm     from fistula    Pneumonia     PONV (postoperative nausea and vomiting)     Prolonged emergence from general anesthesia     States requires more medication than most people    Sleep apnea     Uses CPAP    Stroke ([facility]     7mm thalamic cva 2017 deficts left side, left side weakness    TIA (transient ischemic attack)     Unspecified diseases of blood and blood-forming organs        Past Surgical History:          Procedure Laterality Date    AORTIC VALVE REPLACEMENT N/A 10/15/2019    TRANSCATHETER AORTIC VALVE REPLACEMENT FEMORAL APPROACH performed by Jonathan Tovar MD at [address] [place] [address] Right 7/2/2019    PERITONEAL DIALYSIS CATHETER REMOVAL performed by Jordan Cadet MD at [facility] COLONOSCOPY  2/29/2015    WN    CORONARY ANGIOPLASTY WITH STENT PLACEMENT  05/26/15    CYST REMOVAL  08/14/2013    EXCISION CYSTS, NECK X2 AND ABDOMINAL benign    DIAGNOSTIC CARDIAC CATH LAB PROCEDURE      DIALYSIS FISTULA CREATION Left 10/30/2017    LEFT BRACHIAL CEPHALIC FISTULA    DIALYSIS FISTULA CREATION Left 3/27/2019    LIGATION  AV FISTULA performed by Vikki Ambrosio MD at [facility] ENDOSCOPY, COLON, DIAGNOSTIC      IR TUNNELED CATHETER PLACEMENT GREATER THAN 5 YEARS  3/21/2022    IR TUNNELED CATHETER PLACEMENT GREATER THAN 5 YEARS 3/21/2022 [facility] SPECIAL PROCEDURES    IR TUNNELED CATHETER PLACEMENT GREATER THAN 5 YEARS  4/21/2022    IR TUNNELED CATHETER PLACEMENT GREATER THAN 5 YEARS 4/21/2022 [facility] SPECIAL PROCEDURES    IR TUNNELED CATHETER PLACEMENT GREATER THAN 5 YEARS  4/26/2022    IR TUNNELED CATHETER PLACEMENT GREATER THAN 5 YEARS 4/26/2022 [facility] SPECIAL PROCEDURES    OTHER SURGICAL HISTORY  02/01/2017    laparoscopic cholecystectomy with intraoperative cholangiogram    OTHER SURGICAL HISTORY  2018    PORT PLACEMENT  - vas cath    OTHER SURGICAL HISTORY Bilateral 06/26/2018    laprascopic peritoneal dialysis catheter placement    OTHER SURGICAL HISTORY Right 09/2018    peritoneal dialysis port placed on right side of abdomen    OTHER SURGICAL HISTORY  05/28/2019    PTA/Stenting R External Iliac artery    [place] LAP INSERTION TUNNELED INTRAPERITONEAL CATHETER N/A 9/21/2018    LAPAROSCOPIC PERITONEAL DIALYSIS [facility]HETER [facility] N[facility]A 2/24/2022    PERINEAL ABCESS DRAINAGE performed by Leeann Simpson MD at [facility] ENDOSCOPY  01/06/2016    UPPER GASTROINTESTINAL ENDOSCOPY  01/29/2017    possible candida, otherwise normal appearing    VASCULAR SURGERY  aprx 2 years ago    2 stents placed, each side of groin       Medications Prior to Admission:      Prior to Admission medications    Medication Sig Start Date End Date Taking? Authorizing Provider   clopidogrel (PLAVIX) 75 MG tablet Take 1 tablet by mouth daily 5/9/22   [facility] Fel[facility], APRN - CNP   oxyCODONE-acetaminophen (PERCOCET) 7.5-325 MG per tablet Take 1 tablet by mouth every 6 hours as needed (pain) for up to 30 days.  5/4/22 6/3/22  Pattie Dumont MD   cyclobenzaprine (FLEXERIL) 10 MG tablet TAKE 1 TABLET BY MOUTH EVERY 8 HOURS AS NEEDED 4/28/22   Pattie Dumont MD   pantoprazole (PROTONIX) 40 MG tablet TAKE 1 TABLET BY MOUTH EVERY MORNING BEFORE BREAKFAST 4/28/22   Pattie Dumont MD   QUEtiapine (SEROQUEL) 50 MG tablet TAKE 1 TABLET BY MOUTH IN THE EVENING 4/28/22   Pattie Dumont MD isosorbide dinitrate (ISORDIL) 10 MG tablet Take 1 tablet by mouth 3 times daily 4/27/22   Sara Brown MD   insulin aspart (NOVOLOG FLEXPEN) 100 UNIT/ML injection pen Inject 5 Units into the skin 3 times daily (before meals) 4/27/22   Sara Brown MD   QUEtiapine (SEROQUEL) 25 MG tablet Take 1 tablet by mouth nightly 4/27/22   Sara Brown MD   docusate sodium (DOK) 100 MG capsule Take 1 capsule by mouth daily 4/27/22   Sara Brown MD   LINZESS 145 MCG capsule TAKE 1 CAPSULE BY MOUTH IN THE MORNING BEFORE BREAKFAST 4/27/22   Isamar Montiel MD   fluticasone (FLONASE) 50 MCG/ACT nasal spray SHAKE LIQUID AND USE 2 SPRAYS IN [facility] NOSTRIL DAILY 4/17/22   Isamar Montiel MD   DULoxetine (CYMBALTA) 30 MG extended release capsule TAKE 1 CAPSULE BY MOUTH EVERY DAY 3/29/22   Isamar Montiel MD   tiZANidine (ZANAFLEX) 4 MG tablet TAKE 1 TABLET BY MOUTH THREE TIMES DAILY 3/29/22   Isamar Montiel MD   mineral oil liquid Take 30 mLs by mouth daily as needed for Constipation 3/9/22   Isamar Montiel MD   sennosides-docusate sodium (SENOKOT-S) 8.6-50 MG tablet Take 1 tablet by mouth daily 3/9/22   Isamar Montiel MD   metoprolol succinate (TOPROL XL) 50 MG extended release tablet Take 1 tablet by mouth in the morning and at bedtime 3/3/22   Kannan Hamilton MD   apixaban (ELIQUIS) 5 MG TABS tablet Take 1 tablet by mouth 2 times daily 3/3/22   Kannan Hamilton MD   pravastatin (PRAVACHOL) 40 MG tablet Take 1 tablet by mouth daily 2/10/22   Nicki Merlin, APRN - CNP   insulin glargine [facility]) 100 UNIT/ML injection pen Inject 30 Units into the skin nightly 1/17/22   Marguerite Gutierrez MD   Continuous Blood Gluc Sensor (DEXCOM G6 SENSOR) MISC Every 10 days 10/5/21   Isamar Montiel MD   Continuous Blood Gluc Transmit (DEXCOM G6 TRANSMITTER) MISC 1 each by Does not apply route every 3 months 10/5/21   Isamar Montiel MD   Continuous Blood Gluc  ([address]) [address] 1 each by Does not apply route Daily with lunch 10/5/21   Luz Marina Nguyen MD   B Complex-C-Folic Acid (VIRT-CAPS) 1 MG CAPS TAKE 1 CAPSULE BY MOUTH EVERY DAY 9/20/21   Luz Marina Nguyen MD   Calcium Acetate, Phos Binder, 667 MG CAPS TAKE 1 CAPSULE BY MOUTH THREE TIMES DAILY WITH MEALS 8/12/21   Luz Marina Nguyen MD   nitroGLYCERIN (NITROSTAT) 0.4 MG SL tablet DISSOLVE 1 TABLET UNDER THE TONGUE AS NEEDED FOR CHEST PAIN EVERY 5 MINUTES UP TO 3 TIMES. IF NO RELIEF CALL 911. 1/7/21   Luz Marina Nguyen MD   vitamin D (ERGOCALCIFEROL) 34823 units CAPS capsule TK 1 C PO WEEKLY 6/2/19   Historical Provider, MD   Tiotropium Bromide-Olodaterol (STIOLTO RESPIMAT) 2.5-2.5 MCG/ACT AERS Inhale 2 puffs into the lungs daily 5/21/19   Amalia Covarrubias MD   Blood Glucose Monitoring Suppl ADAM USE AS DIRECTED. 4/25/18   Alexia Dailey MD   Alcohol Swabs PADS USE AS DIRECTED 4/25/18   Alexia Dailey MD   albuterol sulfate  (90 Base) MCG/ACT inhaler Inhale 2 puffs into the lungs every 6 hours as needed for Wheezing 11/8/17   Leobardo Navarro MD   ipratropium-albuterol (DUONEB) 0.5-2.5 (3) MG/3ML SOLN nebulizer solution Inhale 3 mLs into the lungs every 6 hours as needed for Shortness of Breath 10/15/17   Jose Graham MD   calcium carbonate (TUMS) 500 MG chewable tablet Take 1 tablet by mouth 3 times daily as needed for Heartburn. Historical Provider, MD       Allergies:  Morphine    Social History:      The patient currently lives at home    TOBACCO:   reports that he quit smoking about 2 years ago. His smoking use included cigarettes. He has a 16.50 pack-year smoking history. He has never used smokeless tobacco.  ETOH:   reports previous alcohol use. E-Cigarettes/Vaping Use     Questions Responses    E-Cigarette/Vaping Use Never User    Start Date     Passive Exposure     Quit Date     Counseling Given     Comments             Family History:       Reviewed in detail and negative for DM, CAD, Cancer, CVA.  Positive as follows: Problem Relation Age of Onset    Diabetes Mother     Heart Disease Father     Kidney Disease Sister         stage 4-kidney failure    Cancer Sister     Heart Disease Sister     Obesity Sister     Cancer Sister     Heart Disease Sister     Obesity Sister     Alcohol Abuse Brother        REVIEW OF SYSTEMS COMPLETED:   Pertinent positives as noted in the HPI. All other systems reviewed and negative. PHYSICAL EXAM PERFORMED:    BP (!) 159/75   Pulse 97   Temp 97.7 °F (36.5 °C) (Axillary)   Resp 29   SpO2 98%     General appearance:  No apparent distress, appears stated age and cooperative. HEENT:  Normal cephalic, atraumatic without obvious deformity. Pupils equal, round, and reactive to light. Extra ocular muscles intact. Conjunctivae/corneas clear. Neck: Supple, with full range of motion. No jugular venous distention. Trachea midline. Respiratory:  Normal respiratory effort. Clear to auscultation, bilaterally without Rales/Wheezes/Rhonchi. Cardiovascular:  Regular rate and rhythm with normal S1/S2 without murmurs, rubs or gallops. Abdomen: Soft, non-tender, non-distended with normal bowel sounds. Musculoskeletal:  No clubbing, cyanosis or edema bilaterally. Full range of motion without deformity. Skin: Skin color, texture, turgor normal.  No rashes or lesions. Neurologic:  Neurovascularly intact without any focal sensory/motor deficits.  Cranial nerves: II-XII intact, grossly non-focal.  Psychiatric:  Alert and oriented, thought content appropriate, normal insight  Capillary Refill: Brisk,3 seconds, normal  Peripheral Pulses: +2 palpable, equal bilaterally       Labs:     Recent Labs     05/29/22  0944   WBC 13.3*   HGB 9.8*   HCT 29.9*        Recent Labs     05/29/22  0944   *   K 4.5   CL 87*   CO2 24   BUN 63*   CREATININE 8.0*   CALCIUM 8.5     Recent Labs     05/29/22  0944   AST 13*   ALT 10   BILITOT 0.3   ALKPHOS 100     Recent Labs     05/29/22  1155   INR 1.21* Recent Labs     05/29/22  0944   TROPONINI 0.12*       Urinalysis:      Lab Results   Component Value Date    NITRU Negative 05/29/2022    WBCUA 10-20 05/29/2022    BACTERIA 3+ 05/29/2022    RBCUA 5-10 05/29/2022    BLOODU TRACE-INTACT 05/29/2022    SPECGRAV 1.015 05/29/2022    GLUCOSEU 100 05/29/2022       Radiology:     EKG:  I have reviewed the EKG with the following interpretation: nsr, rate of 94, sinus arrhythmia, nl axis    CT HEAD WO CONTRAST   Final Result   No acute intracranial abnormality. CT CHEST PULMONARY EMBOLISM W CONTRAST   Final Result   Negative for acute pulmonary embolus. Bilateral patchy areas of atelectasis or infiltrate in the lungs most   pronounced in the left lower lobe. Bilateral pleural effusions (left greater than right). Mediastinal adenopathy possibly reactive in nature. Recommend follow-up CT   chest in 1-3 months to confirm resolution unless clinically indicated sooner. XR CHEST PORTABLE   Final Result   1. Central predominant edema potentially of cardiac or noncardiogenic origin. Pneumonia could appear similar. 2. Increased left basilar airspace opacity due in part to passive atelectasis   given at least trace to small left pleural effusion. Underlying rounded   atelectasis is likely present given the prior CT appearance, and superimposed   pneumonia and aspiration are not excluded. 3. At least trace right pleural effusion.              ASSESSMENT:    Active Hospital Problems    Diagnosis Date Noted    Hypoglycemia [E16.2] 05/29/2022     Priority: Medium         PLAN:    Hypoglycemia- unclear etiology, no gross signs of infection currently, unclear if due to recent poor po intake and pt continuing to take his DM meds  -start d5w ggt  -monitored sugars  -bcx    Chronic resp failure with assoc SOB/CP- due to chronic pulm/cardiac disease, baseline 3-4L oxygen  -tx underlying issues    Possible sepsis- wbc/tachypnea, ?pulm source given elevated procal, lactate wnl  -bcx  -started Iv vanc/zosyn, pharm assisted vanc dosing  -reeval need for abx tomorrow    ESRD-on HD   -nephro consulted,     CAD- per emr  On plavix/statin/isordil/bb    Elevated troponin- likely esrd related  -trended out    Cardiomyopathy- EF 43%  -continued home meds  -Isordil    Afib- rate controlled  -On tele  -on eliquis    Chronic pain- on prn percocet and flexeril, no longer on zanaflex  contstipation-- held home linzess  Continue docusate    GERD- on ppi  Depression- on cymbalta, seroquel    DM2-borderline   Resumed home basaglar at lower dose on 5/30  Low ssi  Ac/hs bs    DVT Prophylaxis: on home eliquis  Diet: ADULT DIET; Regular; Low Sodium (2 gm); Low Phosphorus (Less than 1000 mg)  Code Status: Full Code    PT/OT Eval Status: not ordered    Dispo - pending workup       Tonio Schafer MD    Thank you Rodríguez Carranza MD for the opportunity to be involved in this patient's care. If you have any questions or concerns please feel free to contact me at [phone].

## 2022-05-29 NOTE — PROGRESS NOTES
4 Eyes Skin Assessment     The patient is being assess for   Admission    I agree that 2 RN's have performed a thorough Head to Toe Skin Assessment on the patient. ALL assessment sites listed below have been assessed. Areas assessed for pressure by both nurses:   [x]   Head, Face, and Ears   [x]   Shoulders, Back, and Chest, Abdomen  [x]   Arms, Elbows, and Hands   [x]   Coccyx, Sacrum, and Ischium  [x]   Legs, Feet, and Heels  Scattered bruising, abrasions and tear RLE      Skin Assessed Under all Medical Devices by both nurses:  O2 device tubing              All Mepilex Borders were peeled back and area peeked at by both nurses:  No: no mepilex  Please list where Mepilex Borders are located:  n/a             **SHARE this note so that the co-signing nurse is able to place an eSignature**    Co-signer eSignature: Electronically signed by Gagan Sneed RN on 5/29/22 at 7:53 PM EDT    Does the Patient have Skin Breakdown related to pressure?   No              Isaac Prevention initiated:  NA   Wound Care Orders initiated:  NA      [facility] nurse consulted for Pressure Injury (Stage 3,4, Unstageable, DTI, NWPT, Complex wounds)and New or Established Ostomies:  NA      Primary Nurse eSignature: Electronically signed by Niko Jackson RN on 5/29/22 at 7:15 PM EDT

## 2022-05-29 NOTE — ED PROVIDER NOTES
[facility]  EMERGENCY DEPARTMENT ENCOUNTER        Pt Name: Janet Sharma  MRN: [id number]  [place] 1970  Date of evaluation: 5/29/2022  Provider: Kristine Grey PA-C  PCP: Juli Booker MD  ED Attending:RAFAEL Laird      This patient was seen and evaluated by the attending physician   I have not independently evaluated this patient. CHIEF COMPLAINT       Chief Complaint   Patient presents with    Altered Mental Status     wife called 911 for ams, pt sts\" im having a hard time breathing\" wears 3L of oxygen 24/7       HISTORY OF PRESENT ILLNESS   (Location/Symptom, Timing/Onset, Context/Setting, Quality, Duration, Modifying Factors, Severity)  Note limiting factors. Janet Sharma is a 45 y.o. male who  has a past medical history of Ambulatory dysfunction, Aortic stenosis, Arthritis, Asthma, Bilateral hilar adenopathy syndrome, CAD (coronary artery disease), Cardiomyopathy ([address] CHF (congestive heart failure) ([address] Chronic pain, COPD (chronic obstructive pulmonary disease) ([address] Depression, Diabetes mellitus ([address] Difficult intravenous access, Emphysema of lung ([address] ESRD (end stage renal disease) on dialysis ([address] Fear of needles, Gastric ulcer, GERD (gastroesophageal reflux disease), Heart valve problem, Hemodialysis patient ([address] History of spinal fracture, Hx of blood clots, Hyperlipidemia, Hypertension, MI (myocardial infarction) ([address] Neuromuscular disorder ([address] Numbness and tingling in left arm, Pneumonia, PONV (postoperative nausea and vomiting), Prolonged emergence from general anesthesia, Sleep apnea, Stroke ([address] TIA (transient ischemic attack), and Unspecified diseases of blood and blood-forming organs for evaluation via EMS with a concern of a one day h/o increasing SOB associated with a 1 day history of diffuse substernal chest pain. Pt typically on 3LNCO2, reports sudden onset of Shortness of breath yesterday.  Using home nebulizer to help with his symptoms with no relief. No known sick contacts. History from patient's wife, [facility], who reports that patient was aggressive and growling at her when he woke up this morning. He had diffuse confusion, specifically he was trying to drink water through his oxygen mask and spilling it everywhere and was sweating profusely, symptom onset at 8 AM, 2 hours prior to arrival.  She also reports that yesterday the patient had decreased p.o. intake of food and water. She continued to give him his diabetes medication as normal.      Nursing Notes were all reviewed and agreed with or any disagreements were addressed  in the HPI. REVIEW OF SYSTEMS  (2-9 systems for level 4, 10 or more for level 5)     Review of Systems   Constitutional: Negative for chills and fever. HENT: Negative. Negative for congestion, rhinorrhea, sinus pressure, sinus pain and sore throat. Eyes: Negative for discharge, redness and visual disturbance. Respiratory: Positive for chest tightness and shortness of breath. Negative for cough. Cardiovascular: Negative for chest pain and palpitations. Gastrointestinal: Negative for abdominal pain, constipation, diarrhea, nausea and vomiting. Genitourinary: Negative for difficulty urinating, dysuria and frequency. Musculoskeletal: Negative. Skin: Negative. Neurological: Negative. Negative for dizziness, weakness, numbness and headaches. Psychiatric/Behavioral: Negative. All other systems reviewed and are negative. Positivesand Pertinent negatives as per HPI. Except as noted above in the ROS, all other systems were reviewed and negative.        PAST MEDICAL HISTORY     Past Medical History:   Diagnosis Date    Ambulatory dysfunction     walker for long distances, SOB with distance    Aortic stenosis     echo 2017    Arthritis     hands and hips    Asthma     Bilateral hilar adenopathy syndrome 6/3/2013    CAD (coronary artery disease)     Dr. Tiffanie Schroeder ([facility] 04/19/2019    EF= 43%    CH[facility]congestive heart failure) [facility]     Chronic pain     COPD (chronic obstructive pulmonary disease) ([facility])     pulmonology Dr. Yann Shi    Depression     Diabetes mellitus ([facility]     borderline    Difficult intravenous access     Emphysema of lung ([facility]     ESRD (end stage renal disease) on dialysis ([facility]     MWF    Fear of needles     Gastric ulcer     GERD (gastroesophageal reflux disease)     Heart valve problem     bicuspic valve    Hemodialysis patient ([facility]     History of spinal fracture     work incident    Hx of blood clots     Bilateral lower extremities; stents in place    Hyperlipidemia     Hypertension     MI (myocardial infarction) ([facility] 2019    has had 9 MIs. 2019 was the last    Neuromuscular disorder ([facility]     due to CVA    Numbness and tingling in left arm     from fistula    Pneumonia     PONV (postoperative nausea and vomiting)     Prolonged emergence from general anesthesia     States requires more medication than most people    Sleep apnea     Uses CPAP    St[facility]     7mm thalamic cva 2017 deficts left side, left side weakness    TIA (transient ischemic attack)     Unspecified diseases of blood and blood-forming organs          SURGICAL HISTORY       Past Surgical History:   Procedure Laterality Date    AORTIC VALVE REPLACEMENT N/A 10/15/2019    TRANSCATHETER AORTIC VALVE REPLACEMENT FEMORAL APPROACH performed by Clemente Scruggs MD at [address] [place] [address] Right 7/2/2019    PERITONEAL DIALYSIS CATHETER REMOVAL performed by Kvng Bobby MD at [facility]  2/29/2015    WN    CORONARY ANGIOPLASTY WITH STENT PLACEMENT  05/26/15    CYST REMOVAL  08/14/2013    EXCISION CYSTS, NECK X2 AND ABDOMINAL benign    DIAGNOSTIC CARDIAC CATH LAB PROCEDURE      DIALYSIS FISTULA CREATION Left 10/30/2017    LEFT BRACHIAL CEPHALIC FISTULA    DIALYSIS FISTULA CREATION Left 3/27/2019    LIGATION AV FISTULA performed by Bertha Bonds MD at [address], COLON, DIAGNOSTIC      IR TUNNELED [address] 5 YEARS  3/21/2022    IR TUNNELED CATHETER PLACEMENT GREATER THAN 5 YEARS 3/21/2022 [place] [facility]    IR TUNNELED CATHETER PLACEMENT GREATER THAN 5 YEARS  4/21/2022    IR TUNNELED CATHETER PLACEMENT GREATER THAN 5 YEARS 4/21/2022 [facility]    IR TUNNELED CATHETER PLACEMENT GREATER THAN 5 YEARS  4/26/2022    IR TUNNELED CATHETER PLACEMENT GREATER THAN 5 YEARS 4/26/2022 [facility]    OTHER SURGICAL HISTORY  02/01/2017    laparoscopic cholecystectomy with intraoperative cholangiogram    OTHER SURGICAL HISTORY  2018    PORT PLACEMENT  - vas cath    OTHER SURGICAL HISTORY Bilateral 06/26/2018    laprascopic peritoneal dialysis catheter placement    OTHER SURGICAL HISTORY Right 09/2018    peritoneal dialysis port placed on right side of abdomen    OTHER SURGICAL HISTORY  05/28/2019    PTA/Stenting R External Iliac artery    CA LAP INSERTION TUNNELED INTRAPERITONEAL CATHETER N/A 9/21/2018    LAPAROSCOPIC PERITONEAL DIALYSIS CATHETER REPLACEMENT performed by Sona Salas MD at [address] [place] [address] S 2/24/2022    PERINEAL ABCESS DRAINAGE performed by Sona Salas MD at [address] [place] [address] ENDOSCOPY  01/06/2016    UPPER GASTROINTESTINAL ENDOSCOPY  01/29/2017    possible candida, otherwise normal appearing    VASCULAR SURGERY  aprx 2 years ago    2 stents placed, each side of groin         CURRENT MEDICATIONS       Current Discharge Medication List      CONTINUE these medications which have NOT CHANGED    Details   clopidogrel (PLAVIX) 75 MG tablet Take 1 tablet by mouth daily  Qty: 90 tablet, Refills: 3      oxyCODONE-acetaminophen (PERCOCET) 7.5-325 MG per tablet Take 1 tablet by mouth every 6 hours as needed (pain) for up to 30 days.   Qty: 120 tablet, Refills: 0    Comments: Reduce doses taken as pain becomes manageable  Associated Diagnoses: Chronic pain syndrome      cyclobenzaprine (FLEXERIL) 10 MG tablet TAKE 1 TABLET BY MOUTH EVERY 8 HOURS AS NEEDED  Qty: 90 tablet, Refills: 10    Associated Diagnoses: Chronic pain syndrome      pantoprazole (PROTONIX) 40 MG tablet TAKE 1 TABLET BY MOUTH EVERY MORNING BEFORE BREAKFAST  Qty: 30 tablet, Refills: 10    Associated Diagnoses: Gastroesophageal reflux disease without esophagitis      !! QUEtiapine (SEROQUEL) 50 MG tablet TAKE 1 TABLET BY MOUTH IN THE EVENING  Qty: 30 tablet, Refills: 10    Associated Diagnoses: Insomnia, unspecified type; Mood disorder ([facility])      isosorbide dinitrate (ISORDIL) 10 MG tablet Take 1 tablet by mouth 3 times daily  Qty: 90 tablet, Refills: 3      insulin aspart (NOVOLOG FLEXPEN) 100 UNIT/ML injection pen Inject 5 Units into the skin 3 times daily (before meals)  Qty: 15 pen, Refills: 5    Associated Diagnoses: Type 2 diabetes mellitus with diabetic nephropathy, with long-term current use of insulin ([facility])      !! QUEtiapine (SEROQUEL) 25 MG tablet Take 1 tablet by mouth nightly  Qty: 60 tablet, Refills: 3      docusate sodium (DOK) 100 MG capsule Take 1 capsule by mouth daily  Qty: 30 capsule, Refills: 5      LINZESS 145 MCG capsule TAKE 1 CAPSULE BY MOUTH IN THE MORNING BEFORE BREAKFAST  Qty: 30 capsule, Refills: 10    Associated Diagnoses: Chronic idiopathic constipation      fluticasone (FLONASE) 50 MCG/ACT nasal spray SHAKE LIQUID AND USE 2 SPRAYS IN EACH NOSTRIL DAILY  Qty: 48 g, Refills: 0    Associated Diagnoses: Nasal congestion      DULoxetine (CYMBALTA) 30 MG extended release capsule TAKE 1 CAPSULE BY MOUTH EVERY DAY  Qty: 30 capsule, Refills: 10    Associated Diagnoses: Depression, unspecified depression type      tiZANidine (ZANAFLEX) 4 MG tablet TAKE 1 TABLET BY MOUTH THREE TIMES DAILY  Qty: 90 tablet, Refills: 10      mineral oil liquid Take 30 mLs by mouth daily as needed for Constipation  Qty: 911.  Qty: 25 tablet, Refills: 10    Associated Diagnoses: Coronary artery disease involving native heart without angina pectoris, unspecified vessel or lesion type      vitamin D (ERGOCALCIFEROL) 30514 units CAPS capsule TK 1 C PO WEEKLY  Refills: 11      Tiotropium Bromide-Olodaterol (STIOLTO RESPIMAT) 2.5-2.5 MCG/ACT AERS Inhale 2 puffs into the lungs daily  Qty: 2 Inhaler, Refills: 0    Comments: 2 samples given:  Lot #[id number], Exp 7/21 & Lot #[id number], Exp 7/21      Blood Glucose Monitoring Suppl ADAM USE AS DIRECTED. Qty: 1 Device, Refills: 0    Comments: WITH CONTROL SOLUTION. Alcohol Swabs PADS USE AS DIRECTED  Qty: 300 each, Refills: 3      albuterol sulfate  (90 Base) MCG/ACT inhaler Inhale 2 puffs into the lungs every 6 hours as needed for Wheezing  Qty: 1 Inhaler, Refills: 3      ipratropium-albuterol (DUONEB) 0.5-2.5 (3) MG/3ML SOLN nebulizer solution Inhale 3 mLs into the lungs every 6 hours as needed for Shortness of Breath  Qty: 360 mL, Refills: 1      calcium carbonate (TUMS) 500 MG chewable tablet Take 1 tablet by mouth 3 times daily as needed for Heartburn. !! - Potential duplicate medications found. Please discuss with provider.             ALLERGIES     Morphine    FAMILY HISTORY       Family History   Problem Relation Age of Onset    Diabetes Mother     Heart Disease Father     Kidney Disease Sister         stage 4-kidney failure    Cancer Sister     Heart Disease Sister     Obesity Sister     Cancer Sister     Heart Disease Sister     Obesity Sister     Alcohol Abuse Brother          SOCIAL HISTORY       Social History     Socioeconomic History    Marital status:      Spouse name: None    Number of children: None    Years of education: None    Highest education level: None   Occupational History    None   Tobacco Use    Smoking status: Former Smoker     Packs/day: 0.50     Years: 33.00     Pack years: 16.50     Types: Cigarettes     Quit date: 4/26/2020 Years since quittin.0    Smokeless tobacco: Never Used    Tobacco comment: States quit 2021   Vaping Use    Vaping Use: Never used   Substance and Sexual Activity    Alcohol use: Not Currently     Alcohol/week: 0.0 standard drinks     Comment: occ    Drug use: No    Sexual activity: Yes     Partners: Female     Comment:    Other Topics Concern    None   Social History Narrative    None     Social Determinants of Health     Financial Resource Strain:     Difficulty of Paying Living Expenses: Not on file   Food Insecurity:     Worried About Running Out of Food in the Last Year: Not on file    Louise of Food in the Last Year: Not on file   Transportation Needs:     Lack of Transportation (Medical): Not on file    Lack of Transportation (Non-Medical): Not on file   Physical Activity:     Days of Exercise per Week: Not on file    Minutes of Exercise per Session: Not on file   Stress:     Feeling of Stress : Not on file   Social Connections:     Frequency of Communication with Friends and Family: Not on file    Frequency of Social Gatherings with Friends and Family: Not on file    Attends Orthodoxy Services: Not on file    Active Member of [address] [place] [address] or Organizations: Not on file    Attends Club or Organization Meetings: Not on file    Marital Status: Not on file   Intimate Partner Violence:     Fear of Current or Ex-Partner: Not on file    Emotionally Abused: Not on file    Physically Abused: Not on file    Sexually Abused: Not on file   Housing Stability:     Unable to Pay for Housing in the Last Year: Not on file    Number of [place] in the Last Year: Not on file    Unstable Housing in the Last Year: Not on file       SCREENINGS     NIH Score  NIH Stroke Scale  Interval: Baseline  Level of Consciousness (1a): Alert  LOC Questions (1b):  Answers both correctly  LOC Commands (1c): Performs both tasks correctly  Best Gaze (2): Normal  Visual (3): No visual loss  Facial Palsy (4): Normal symmetrical movement  Motor Arm, Left (5a): No drift  Motor Arm, Right (5b): No drift  Motor Leg, Left (6a): No drift  Motor Leg, Right (6b): No drift  Limb Ataxia (7): Absent  Sensory (8): Normal  Best Language (9): No aphasia  Dysarthria (10): Normal  Extinction and Inattention (11): No abnormality  Total: 0    Glascow [place]udgins Coma Scale  Eye Opening: Spontaneous  Best Verbal Response: Oriented  Best Motor Response: Obeys commands  Robert Coma Scale Score: 15    [facility]     Heart Score         PHYSICAL EXAM    (up to 7 for level 4, 8 ormore for level 5)     ED Triage Vitals [05/29/22 0929]   BP Temp Temp Source Heart Rate Resp SpO2 Height Weight   (!) 160/111 98.2 °F (36.8 °C) Oral 95 24 96 % -- --       Physical Exam  Vitals and nursing note reviewed. Constitutional:       Appearance: He is well-developed. He is obese. He is not diaphoretic. Comments: NCO2 4L  Appears older than stated age   HENT:      Head: Normocephalic. Nose: Nose normal.      Mouth/Throat:      Pharynx: No oropharyngeal exudate. Eyes:      General:         Right eye: No discharge. Left eye: No discharge. Conjunctiva/sclera: Conjunctivae normal.      Pupils: Pupils are equal, round, and reactive to light. Cardiovascular:      Rate and Rhythm: Normal rate and regular rhythm. Heart sounds: Normal heart sounds. No murmur heard. No friction rub. No gallop. Pulmonary:      Effort: No respiratory distress. Breath sounds: Rhonchi present. No wheezing. Comments: Poor air movment  Chest:      Comments: Vas Cath to left side  Abdominal:      General: Bowel sounds are normal. There is no distension. Palpations: Abdomen is soft. Tenderness: There is no abdominal tenderness. Musculoskeletal:         General: Normal range of motion. Cervical back: Normal range of motion. Skin:     General: Skin is warm and dry.    Neurological:      Mental Status: He is alert and oriented to person, place, and time. GCS: GCS eye subscore is 4. GCS verbal subscore is 5. GCS motor subscore is 6.    Psychiatric:         Mood and Affect: Mood normal.         Speech: Speech normal.         Behavior: Behavior normal.             DIAGNOSTIC RESULTS   LABS:    Labs Reviewed   CBC WITH AUTO DIFFERENTIAL - Abnormal; Notable for the following components:       Result Value    WBC 13.3 (*)     RBC 3.32 (*)     Hemoglobin 9.8 (*)     Hematocrit 29.9 (*)     RDW 16.5 (*)     Neutrophils Absolute 12.0 (*)     Lymphocytes Absolute 0.4 (*)     All other components within normal limits   COMPREHENSIVE METABOLIC PANEL W/ REFLEX TO MG FOR LOW K - Abnormal; Notable for the following components:    Sodium 133 (*)     Chloride 87 (*)     Anion Gap 22 (*)     Glucose 59 (*)     BUN 63 (*)     CREATININE 8.0 (*)     GFR Non- 7 (*)     GFR  9 (*)     AST 13 (*)     All other components within normal limits    Narrative:     CALL  [facility] tel. [phone],  Previous panic on this admission - call not needed per SOP, 05/29/2022 10:48,  by DEAN   TROPONIN - Abnormal; Notable for the following components:    Troponin 0.12 (*)     All other components within normal limits    Narrative:     CALL  [facility] tel. [phone],  Previous panic on this admission - call not needed per SOP, 05/29/2022 10:48,  by [address] - Abnormal; Notable for the following components:    Pro-BNP >70,000 (*)     All other components within normal limits   BLOOD GAS, VENOUS - Abnormal; Notable for the following components:    pH, Blade 7.303 (*)     pCO2, Blade 52.6 (*)     Carboxyhemoglobin 2.3 (*)     All other components within normal limits   PROCALCITONIN - Abnormal; Notable for the following components:    Procalcitonin 0.73 (*)     All other components within normal limits    Narrative:     CALL  [facility] tel. [phone],  Previous panic on this admission - call not needed per SOP, 05/29/2022 10:48,  by Vaughn Blandon   URINALYSIS WITH REFLEX TO CULTURE - Abnormal; Notable for the following components:    Glucose, Ur 100 (*)     Ketones, Urine TRACE (*)     Blood, Urine TRACE-INTACT (*)     Protein, UA >=300 (*)     All other components within normal limits   PROTIME-INR - Abnormal; Notable for the following components:    Protime 15.1 (*)     INR 1.21 (*)     All other components within normal limits   MICROSCOPIC URINALYSIS - Abnormal; Notable for the following components:    WBC, UA 10-20 (*)     RBC, UA 5-10 (*)     Bacteria, UA 3+ (*)     All other components within normal limits   TROPONIN - Abnormal; Notable for the following components:    Troponin 0.10 (*)     All other components within normal limits   POCT GLUCOSE - Abnormal; Notable for the following components:    POC Glucose 51 (*)     All other components within normal limits   POCT GLUCOSE - Abnormal; Notable for the following components:    POC Glucose 59 (*)     All other components within normal limits   POCT GLUCOSE - Abnormal; Notable for the following components:    POC Glucose 125 (*)     All other components within normal limits   POCT GLUCOSE - Abnormal; Notable for the following components:    POC Glucose 101 (*)     All other components within normal limits   POCT GLUCOSE - Abnormal; Notable for the following components:    POC Glucose 125 (*)     All other components within normal limits   POCT GLUCOSE - Abnormal; Notable for the following components:    POC Glucose 147 (*)     All other components within normal limits   POCT GLUCOSE - Normal   COVID-19, RAPID   RAPID INFLUENZA A/B ANTIGENS   CULTURE, BLOOD 1   CULTURE, BLOOD 2   CULTURE, URINE   LACTATE, SEPSIS   SAMPLE POSSIBLE BLOOD BANK TESTING   APTT   TROPONIN   TROPONIN   BASIC METABOLIC PANEL W/ REFLEX TO MG FOR LOW K   CBC WITH AUTO DIFFERENTIAL   VANCOMYCIN LEVEL, RANDOM   POCT GLUCOSE   POCT GLUCOSE   POCT GLUCOSE       All other labs were within normal range or notreturned as of this dictation. EKG: All EKG's are interpreted by the Emergency Department Physician who either signs or Co-signs this chart in the absence of a cardiologist.  Please see their note for interpretation of EKG. RADIOLOGY:   Interpretation per the Radiologist below, if available at the time of this note:    CT HEAD WO CONTRAST   Final Result   No acute intracranial abnormality. CT CHEST PULMONARY EMBOLISM W CONTRAST   Final Result   Negative for acute pulmonary embolus. Bilateral patchy areas of atelectasis or infiltrate in the lungs most   pronounced in the left lower lobe. Bilateral pleural effusions (left greater than right). Mediastinal adenopathy possibly reactive in nature. Recommend follow-up CT   chest in 1-3 months to confirm resolution unless clinically indicated sooner. XR CHEST PORTABLE   Final Result   1. Central predominant edema potentially of cardiac or noncardiogenic origin. Pneumonia could appear similar. 2. Increased left basilar airspace opacity due in part to passive atelectasis   given at least trace to small left pleural effusion. Underlying rounded   atelectasis is likely present given the prior CT appearance, and superimposed   pneumonia and aspiration are not excluded. 3. At least trace right pleural effusion. PROCEDURES   Unless otherwise noted below, none     Procedures    CRITICAL CARE TIME   Total critical care time today provided was 45 minutes. This excludes seperately billable procedures and family discussion time. Provided for acute on chronic respiratory failure, hypoglycemia, pleural effusion requiring intervention with concern for potential decompensation. Is this patient to be included in the SEP-1 Core Measure due to severe sepsis or septic shock?    No   Exclusion criteria - the patient is NOT to be included for SEP-1 Core Measure due to:  May have criteria for sepsis, but does not meet criteria for severe sepsis or septic shock     CONSULTS:  IP CONSULT TO NEPHROLOGY  IP CONSULT TO CARDIOLOGY  IP CONSULT TO PHARMACY      EMERGENCY DEPARTMENT COURSE and DIFFERENTIAL DIAGNOSIS/MDM:   Vitals:    Vitals:    05/29/22 1148 05/29/22 1306 05/29/22 1505 05/29/22 1712   BP: (!) 156/99 (!) 170/97 (!) 159/75    Pulse: 94 94 97    Resp: 14 16 29 22   Temp:  98 °F (36.7 °C) 97.7 °F (36.5 °C)    TempSrc:  Oral Axillary    SpO2: 100% 100% 98%        Patient was given the following medications:  Medications   mupirocin (BACTROBAN) 2 % ointment ( Topical Not Given 5/29/22 1516)   dextrose 5 % 1,000 mL infusion ( IntraVENous New Bag 5/29/22 1626)   QUEtiapine (SEROQUEL) tablet 25 mg (has no administration in time range)   albuterol sulfate  (90 Base) MCG/ACT inhaler 2 puff (has no administration in time range)   apixaban (ELIQUIS) tablet 5 mg (5 mg Oral Not Given 5/29/22 1835)   b complex-C-folic acid (NEPHROCAPS) capsule 1 mg (has no administration in time range)   calcium acetate (PHOSLO) capsule 667 mg (667 mg Oral Not Given 5/29/22 1634)   clopidogrel (PLAVIX) tablet 75 mg (75 mg Oral Not Given 5/29/22 1634)   calcium carbonate (TUMS) chewable tablet 500 mg (has no administration in time range)   cyclobenzaprine (FLEXERIL) tablet 10 mg (has no administration in time range)   docusate sodium (COLACE) capsule 100 mg (100 mg Oral Not Given 5/29/22 1603)   DULoxetine (CYMBALTA) extended release capsule 30 mg (has no administration in time range)   insulin glargine (LANTUS) injection vial 15 Units (has no administration in time range)   ipratropium-albuterol (DUONEB) nebulizer solution 3 mL (has no administration in time range)   isosorbide dinitrate (ISORDIL) tablet 10 mg (10 mg Oral Not Given 5/29/22 1634)   tiotropium-olodaterol (STIOLTO) 2.5-2.5 MCG/ACT inhaler 2 puff ( Inhalation Canceled Entry 5/29/22 1530)   sennosides-docusate sodium (SENOKOT-S) 8.6-50 MG tablet 1 tablet (1 tablet Oral Not Given 5/29/22 5205) pravastatin (PRAVACHOL) tablet 40 mg (has no administration in time range)   pantoprazole (PROTONIX) tablet 40 mg (has no administration in time range)   metoprolol succinate (TOPROL XL) extended release tablet 50 mg (has no administration in time range)   nitroGLYCERIN (NITROSTAT) SL tablet 0.4 mg (has no administration in time range)   mineral oil liquid 30 mL (has no administration in time range)   oxyCODONE-acetaminophen (PERCOCET) 7.5-325 MG per tablet 1 tablet (has no administration in time range)   sodium chloride flush 0.9 % injection 5-40 mL (has no administration in time range)   sodium chloride flush 0.9 % injection 5-40 mL (has no administration in time range)   0.9 % sodium chloride infusion (has no administration in time range)   ondansetron (ZOFRAN-ODT) disintegrating tablet 4 mg (has no administration in time range)     Or   ondansetron (ZOFRAN) injection 4 mg (has no administration in time range)   polyethylene glycol (GLYCOLAX) packet 17 g (has no administration in time range)   acetaminophen (TYLENOL) tablet 650 mg (has no administration in time range)     Or   acetaminophen (TYLENOL) suppository 650 mg (has no administration in time range)   vancomycin 1500 mg in dextrose 5% 300 mL IVPB (has no administration in time range)   piperacillin-tazobactam (ZOSYN) 3,375 mg in dextrose 5 % 50 mL IVPB extended infusion (mini-bag) (3,375 mg IntraVENous New Bag 5/29/22 1617)   hydrOXYzine (ATARAX) tablet 10 mg (has no administration in time range)   glucose chewable tablet 16 g (has no administration in time range)   dextrose bolus 10% 125 mL (has no administration in time range)     Or   dextrose bolus 10% 250 mL (has no administration in time range)   glucagon (rDNA) injection 1 mg (has no administration in time range)   dextrose 5 % solution (has no administration in time range)   traZODone (DESYREL) tablet 50 mg (has no administration in time range)   vancomycin (VANCOCIN) intermittent dosing (placeholder) (has no administration in time range)   dexamethasone (DECADRON) injection 6 mg (6 mg IntraVENous Given 5/29/22 1137)   ipratropium-albuterol (DUONEB) nebulizer solution 1 ampule (1 ampule Inhalation Given 5/29/22 1103)   iopamidol (ISOVUE-370) 76 % injection 75 mL (75 mLs IntraVENous Given 5/29/22 1005)   oxyCODONE-acetaminophen (PERCOCET) 5-325 MG per tablet 1 tablet (1 tablet Oral Given 5/29/22 1146)         Afebrile, stable, patient presents to the ED for evaluation. Seen in conjunction with attending ED provider who agrees with assessment and plan. No fluids administered as h/o decreased EF. Hypoxic on room air at baseline. Patient with increasing oxygen demand from his 3 L baseline to 4 L. Hyperventilating on arrival.  Patient is provided with steroids and a breathing treatment. Pt fluid overloaded. Bilateral pleural effusions. All questions are answered. Pt admitted to hospitalist.   ED Course as of 05/29/22 1929   Sun May 29, 2022   1140 Nursing staff requested Percocet to treat patient's chronic pain and is typically what he takes at home. [AR]   1140 Patient's labs returned revealing leukocytosis with a white blood cell count of 13.3. Hemoglobin of 9.8 hematocrit of 29.9 this is consistent with patient's baseline. Sodium of 133 chloride of 87. Anion gap of 22. Glucose is 59. Patient has been administered 3 juices while trying to get access. And then D10 is ordered. Patient's bun and creatinine and GFR are consistent with patient's baseline. Lactic of 0.8. BNP elevated at 70 greater than 70,000 which is also consistent with patient's baseline.   [AR]   1216 Spoke with hospitalist regarding admission he accepts the patient in stable condition. He request that we stop the D10 drip, and closely monitor patient's sugar, to help determine bed placement [AR]      ED Course User Index  [AR] Preston Cortez PA-C      The patient tolerated their visit well.   They were seen and evaluated by the attendingphysician, No att. providers found who agreed with the assessment and plan. The patient and / or the family were informed of the results of any tests, a time was given to answer questions, a plan was proposed and they agreed Yina Hussein. FINAL IMPRESSION      1. Shortness of breath    2. Altered mental status, unspecified altered mental status type    3. Hypoglycemia    4. Acute on chronic respiratory failure with hypoxia (HCC)    5. Mediastinal adenopathy          DISPOSITION/PLAN   DISPOSITION    admit    PATIENT REFERRED TO:  No follow-up provider specified. DISCHARGE MEDICATIONS:  Current Discharge Medication List          DISCONTINUED MEDICATIONS:  Current Discharge Medication List                 Pt was seen during the COVID 19 pandemic. Appropriate PPE worn by ME during patient encounters. Pt seen during a time with constrained hospital bed capacity and other potential inpatient and outpatient resources were constrained due to the viral pandemic.    Please note that portions of this note were completed with a voice recognition program.  Efforts were made to edit the dictations but occasionally words are mis-transcribed.)    Alea Ruvalcaba PA-C (electronically signed)        Alea Ruvalcaba PA-C  05/29/22 1930

## 2022-05-29 NOTE — ED NOTES
Repeat FSBS 125. RN attempted to call report to C4. Nurse not available.       Jessenia Cohen RN  05/29/22 7611

## 2022-05-29 NOTE — ED PROVIDER NOTES
I independently examined and evaluated [facility]. In brief, patient is a 57-year-old male presents emergency department for evaluation of shortness of breath and altered mental status. On my assessment, patient was satting greater than 90% on his home 3 tubular nasal cannula O2. He was answering questions appropriately. He was found to be hypoglycemic. Difficult access and I was asked to place an US guided IV. Patient given D10 after obtaining IV access. He has leukocytosis. Hemoglobin 8.8, no significant change compared to baseline. Creatinine is 8. He has known ESRD and on hemodialysis. Troponin elevated 0.12. However, this is not significantly changed compared to baseline. He has chronically elevated troponins. BNP greater than 70,000. CTA shows no acute intracranial bleed. Chest x-ray shows central pulmonary edema may be due to HF. Hospitalist consulted for admission for further evaluation and treatment. Admit. Ultrasound guided peripheral venous access  Procedure note:  Indication: medication administration, fluid resuscitation, hypotension, lab draw, educational, credentialing  Billable study: yes  Views:  Short access view of target vein: Adequate  Images obtained with findings:   Vein compressible: yes  Needle tip within vein: yes  Impression:   Successful cannulation of vein: yes  Images obtained by chris Suero, interpreted by marcel joshi. Images were saved to ultrasound machine. All diagnostic, treatment, and disposition decisions were made by myself in conjunction with the advanced practice provider. I personally saw the patient and performed a substantive portion of the visit including aspects of the medical decision making. I personally saw the patient and independently provided 20 minutes of non-concurrent critical care out of the total shared critical care time provided.     Comment: Please note this report has been produced using speech recognition software and may contain errors related to that system including errors in grammar, punctuation, and spelling, as well as words and phrases that may be inappropriate. If there are any questions or concerns please feel free to contact the dictating provider for clarification. For all further details of the patient's emergency department visit, please see the advanced practice provider's documentation.          Brian Hair MD  05/29/22 1896

## 2022-05-29 NOTE — ED NOTES
Pt given sandwich and granola bar.  FSBS to be rechecked around [address] [facility]  05/29/22 0926

## 2022-05-29 NOTE — PROGRESS NOTES
05/29/22 1712   NIV Type   $NIV $Daily Charge   NIV Started/Stopped On   Equipment Type v60   Mode AVAPS   Mask Type Full face mask   Mask Size Large   Settings/Measurements   PIP Observed 19 cm H20   CPAP/EPAP 6 cmH2O   IPAP Min 18 cmH2O   IPAP Max 25 cmH2O   Rate Ordered 15   Resp 22   FiO2  35 %   I Time/ I Time % 1 s   Vt Exhaled 655 mL   Minute Volume 13.8 Liters   Mask Leak (lpm) 24 lpm  (beard)   Comfort Level Good   Using Accessory Muscles No   SpO2 98   Patient's Home Machine No   Alarm Settings   Alarms On Y   Low Pressure 6 cmH2O   High Pressure  30 cmH2O   Apnea (secs) 20 secs   RR High 40 br/min

## 2022-05-29 NOTE — CONSULTS
Pharmacy Note  Vancomycin Consult    Shirley Vargas is a 45 y.o. male started on Vancomycin for sepsis of unknown origin; consult received from Dr. Yossi Avalos to manage therapy. Also receiving the following antibiotics: Zosyn. Allergies:  Morphine     Recent Labs     05/29/22  0944   CREATININE 8.0*       Recent Labs     05/29/22  0944   WBC 13.3*       Estimated Creatinine Clearance: 12 mL/min (A) (based on SCr of 8 mg/dL [facility])). No intake or output data in the 24 hours ending 05/29/22 1512    Wt Readings from Last 1 Encounters:   05/25/22 245 lb (111.1 kg)         There is no height or weight on file to calculate BMI. Loading dose (critically ill or in ICU, require dialysis or renal replacement therapy): Vancomycin 25 mg/kg IVPB x 1 (maximum 3000 mg). Maintenance dose: 15 mg/kg (maximum: 2000 mg/dose and 4500 mg/day) starting at the next dosing interval determined by renal function  Pulse dose: fluctuating renal function, CAESAR, ESRD   Goal Vancomycin trough: 10-15 mcg/mL or 15-20 mcg/mL   Goal Vancomycin AUC: 400-600     Assessment/Plan:  Typically HD Mon, Wed, Fri  Pulse dosing. Vancomycin 1500 mg x1. Vanc level ordered tomorrow AM to further assess dosing    Thank you for the consult. Oscar Duke, PharmD  5/29/2022 at 3:14 PM      Vancomycin Day 2  Pulse dose; HD typically on MWF (receiving HD today)  Vancomycin random = 20.7 mcg/mL at 0450  Estimated Creatinine Clearance: 12 mL/min (A) (based on SCr of 8.4 mg/dL [facility])). Will hold doses for today  Recheck Vanc level tomorrow AM  Ziyad Dumas, PharmD 5/30/2022  8:17 AM    Day 3  Vancomycin level 13 mcg/mL at 0449 today. Vancomycin 500mg IVPB x 1 today. Vancomycin level tomorrow AM (as iHD MWF)  Continue to pulse dose Vancomycin at this time. Ok Styles.  Tonya RodgersD, [facility]   5/31/2022 9:40 AM

## 2022-05-29 NOTE — PLAN OF CARE
PLAN OF CARE    Received request for inpatient admission. Admitting provider Dr. Dov Edwards notified.     Emmy Tamez MD  5/29/2022  11:35 AM

## 2022-05-29 NOTE — CONSULTS
[facility] Consult Note           Reason for Consult:  End stage kidney disease  Requesting Physician:  Dr. Kyleigh Garcia    Chief Complaint:  Chest pain, shortness of breath  History Obtained From:  patient, electronic medical record    History of Present Ilness:    45year old male with ESKD on HD Mon-Wed-Fri, COPD, CAD, HTN, DM admitted with chest pain, shortness of breath. We have been asked to assist in further dialysis care. Last had HD on 5/27 with 1.3 liters removed, post-weight of 108.6 kg. Has been having low blood sugars, requiring D10 drip. States has been having shortness of breath, chest pain ~1 day, uses ~3 liters NC at home. CTPA negative for PE. Having intermittent confusion, +weak, no fevers.     Past Medical History:        Diagnosis Date    Ambulatory dysfunction     walker for long distances, SOB with distance    Aortic stenosis     echo 2017    Arthritis     hands and hips    Asthma     Bilateral hilar adenopathy syndrome 6/3/2013    CAD (coronary artery disease)     Dr. Tiffanie Schroeder [facility]) 04/19/2019    EF= 43%    CHF (congestive heart failure) (HCC)     Chronic pain     COPD (chronic obstructive pulmonary disease) ([address]     pulmonology Dr. Patito Cruz    Depression     Diabetes mellitus ([address]     borderline    Difficult intravenous access     Emphysema of lung ([address]     ESRD (end stage renal disease) on dialysis ([address]     MWF    Fear of needles     Gastric ulcer     GERD (gastroesophageal reflux disease)     Heart valve problem     bicuspic valve    Hemodialysis patient ([address]     History of spinal fracture     work incident    Hx of blood clots     Bilateral lower extremities; stents in place    Hyperlipidemia     Hypertension     MI (myocardial infarction) ([address] 2019    has had 9 MIs. 2019 was the last    Neuromuscular disorder ([address]     due to CVA    Numbness and tingling in left arm from fistula    Pneumonia     PONV (postoperative nausea and vomiting)     Prolonged emergence from general anesthesia     States requires more medication than most people    Sleep apnea     Uses CPAP    Stroke [facility]     7mm thalamic cva 2017 deficts left side, left side weakness    TIA (transient ischemic attack)     Unspecified diseases of blood and blood-forming organs        Past Surgical History:        Procedure Laterality Date    AORTIC VALVE REPLACEMENT N/A 10/15/2019    TRANSCATHETER AORTIC VALVE REPLACEMENT FEMORAL APPROACH performed by Farida Gandhi MD at [address] Right 7/2/2019    PERITONEAL DIALYSIS CATHETER REMOVAL performed by Tammie Werner MD at [address]  2/29/2015    WN    CORONARY ANGIOPLASTY WITH STENT PLACEMENT  05/26/15    CYST REMOVAL  08/14/2013    EXCISION CYSTS, NECK X2 AND ABDOMINAL benign    DIAGNOSTIC CARDIAC CATH LAB PROCEDURE      DIALYSIS FISTULA CREATION Left 10/30/2017    LEFT BRACHIAL CEPHALIC FISTULA    DIALYSIS FISTULA CREATION Left 3/27/2019    LIGATION  AV FISTULA performed by Ciera Hermosillo MD at [facility], COLON, DIAGNOSTIC      IR TUNNELED [address] 5 YEARS  3/21/2022    IR TUNNELED CATHETER PLACEMENT GREATER THAN 5 YEARS 3/21/2022 [facility]    IR TUNNELED CATHETER PLACEMENT GREATER THAN 5 YEARS  4/21/2022    IR TUNNELED CATHETER PLACEMENT GREATER THAN 5 YEARS 4/21/2022 [facility]    IR TUNNELED CATHETER PLACEMENT GREATER THAN 5 YEARS  4/26/2022    IR TUNNELED CATHETER PLACEMENT GREATER THAN 5 YEARS 4/26/2022 [facility]    OTHER SURGICAL HISTORY  02/01/2017    laparoscopic cholecystectomy with intraoperative cholangiogram    OTHER SURGICAL HISTORY  2018    PORT PLACEMENT  - vas cath    OTHER SURGICAL HISTORY Bilateral 06/26/2018    laprascopic peritoneal dialysis catheter placement    OTHER SURGICAL HISTORY Right 09/2018    peritoneal dialysis port placed on right side of abdomen    OTHER SURGICAL HISTORY  05/28/2019    PTA/Stenting R External Iliac artery    NC LAP INSERTION TUNNELED INTRAPERITONEAL CATHETER N/A 9/21/2018    LAPAROSCOPIC PERITONEAL DIALYSIS CATHETER REPLACEMENT performed by Alonso Layne MD at [address] N/A 2/24/2022    PERINEAL ABCESS DRAINAGE performed by Alonso Layne MD at [address] [facility] ENDOSCOPY  01/06/2016    UPPER GASTROINTESTINAL ENDOSCOPY  01/29/2017    possible candida, otherwise normal appearing    VASCULAR SURGERY  aprx 2 years ago    2 stents placed, each side of groin       Home Medications:    No current facility-administered medications on file prior to encounter. Current Outpatient Medications on File Prior to Encounter   Medication Sig Dispense Refill    clopidogrel (PLAVIX) 75 MG tablet Take 1 tablet by mouth daily 90 tablet 3    oxyCODONE-acetaminophen (PERCOCET) 7.5-325 MG per tablet Take 1 tablet by mouth every 6 hours as needed (pain) for up to 30 days.  120 tablet 0    cyclobenzaprine (FLEXERIL) 10 MG tablet TAKE 1 TABLET BY MOUTH EVERY 8 HOURS AS NEEDED 90 tablet 10    pantoprazole (PROTONIX) 40 MG tablet TAKE 1 TABLET BY MOUTH EVERY MORNING BEFORE BREAKFAST 30 tablet 10    QUEtiapine (SEROQUEL) 50 MG tablet TAKE 1 TABLET BY MOUTH IN THE EVENING 30 tablet 10    isosorbide dinitrate (ISORDIL) 10 MG tablet Take 1 tablet by mouth 3 times daily 90 tablet 3    insulin aspart (NOVOLOG FLEXPEN) 100 UNIT/ML injection pen Inject 5 Units into the skin 3 times daily (before meals) 15 pen 5    QUEtiapine (SEROQUEL) 25 MG tablet Take 1 tablet by mouth nightly 60 tablet 3    docusate sodium (DOK) 100 MG capsule Take 1 capsule by mouth daily 30 capsule 5    LINZESS 145 MCG capsule TAKE 1 CAPSULE BY MOUTH IN THE MORNING BEFORE BREAKFAST 30 capsule 10    fluticasone (FLONASE) 50 MCG/ACT nasal spray SHAKE LIQUID AND USE 2 SPRAYS IN EACH NOSTRIL DAILY 48 g 0    DULoxetine (CYMBALTA) 30 MG extended release capsule TAKE 1 CAPSULE BY MOUTH EVERY DAY 30 capsule 10    tiZANidine (ZANAFLEX) 4 MG tablet TAKE 1 TABLET BY MOUTH THREE TIMES DAILY 90 tablet 10    mineral oil liquid Take 30 mLs by mouth daily as needed for Constipation 300 mL 0    sennosides-docusate sodium (SENOKOT-S) 8.6-50 MG tablet Take 1 tablet by mouth daily 10 tablet 0    metoprolol succinate (TOPROL XL) 50 MG extended release tablet Take 1 tablet by mouth in the morning and at bedtime 60 tablet 1    apixaban (ELIQUIS) 5 MG TABS tablet Take 1 tablet by mouth 2 times daily 60 tablet 1    pravastatin (PRAVACHOL) 40 MG tablet Take 1 tablet by mouth daily 90 tablet 3    insulin glargine (BASAGLAR KWIKPEN) 100 UNIT/ML injection pen Inject 30 Units into the skin nightly 15 mL 1    Continuous Blood Gluc Sensor (DEXCOM G6 SENSOR) MISC Every 10 days 9 each 3    Continuous Blood Gluc Transmit (DEXCOM G6 TRANSMITTER) MISC 1 each by Does not apply route every 3 months 1 each 3    Continuous Blood Gluc  (DEXCOM G6 ) ADAM 1 each by Does not apply route Daily with lunch 1 each 0    B Complex-C-Folic Acid (VIRT-CAPS) 1 MG CAPS TAKE 1 CAPSULE BY MOUTH EVERY DAY 90 capsule 1    Calcium Acetate, Phos Binder, 667 MG CAPS TAKE 1 CAPSULE BY MOUTH THREE TIMES DAILY WITH MEALS 90 capsule 3    nitroGLYCERIN (NITROSTAT) 0.4 MG SL tablet DISSOLVE 1 TABLET UNDER THE TONGUE AS NEEDED FOR CHEST PAIN EVERY 5 MINUTES UP TO 3 TIMES. IF NO RELIEF CALL 911. 25 tablet 10    vitamin D (ERGOCALCIFEROL) 74401 units CAPS capsule TK 1 C PO WEEKLY  11    Tiotropium Bromide-Olodaterol (STIOLTO RESPIMAT) 2.5-2.5 MCG/ACT AERS Inhale 2 puffs into the lungs daily 2 Inhaler 0    Blood Glucose Monitoring Suppl ADAM USE AS DIRECTED.  1 Device 0    Alcohol Swabs PADS USE AS DIRECTED 300 each 3    albuterol sulfate  (90 Base) MCG/ACT inhaler Inhale 2 puffs into the lungs every 6 hours as needed for Wheezing 1 Inhaler 3    ipratropium-albuterol (DUONEB) 0.5-2.5 (3) MG/3ML SOLN nebulizer solution Inhale 3 mLs into the lungs every 6 hours as needed for Shortness of Breath 360 mL 1    calcium carbonate (TUMS) 500 MG chewable tablet Take 1 tablet by mouth 3 times daily as needed for Heartburn. Allergies:  Morphine    Social History:    Social History     Socioeconomic History    Marital status:      Spouse name: Not on file    Number of children: Not on file    Years of education: Not on file    Highest education level: Not on file   Occupational History    Not on file   Tobacco Use    Smoking status: Former Smoker     Packs/day: 0.50     Years: 33.00     Pack years: 16.50     Types: Cigarettes     Quit date: 2020     Years since quittin.0    Smokeless tobacco: Never Used    Tobacco comment: [facility] quit 2021   Vaping Use    Vaping Use: Never used   Substance and Sexual Activity    Alcohol use: Not Currently     Alcohol/week: 0.0 standard drinks     Comment: occ    Drug use: No    Sexual activity: Yes     Partners: Female     Comment:    Other Topics Concern    Not on file   Social History Narrative    Not on file     Social Determinants of Health     Financial Resource Strain:     Difficulty of Paying Living Expenses: Not on file   Food Insecurity:     Worried About [address] in the Last Year: Not on file    [address] in the Last Year: Not on file   Transportation Needs:     Lack of Transportation (Medical): Not on file    Lack of Transportation (Non-Medical):  Not on file   Physical Activity:     Days of Exercise per Week: Not on file    Minutes of Exercise per Session: Not on file   Stress:     Feeling of Stress : Not on file   Social Connections:     Frequency of Communication with Friends and Family: Not on file    Frequency of Social Gatherings with Friends and Family: Not on file    Attends Mosque Services: Not on file    Active Member of Clubs or Organizations: Not on file    Attends Club or Organization Meetings: Not on file    Marital Status: Not on file   Intimate Partner Violence:     Fear of Current or Ex-Partner: Not on file    Emotionally Abused: Not on file    Physically Abused: Not on file    Sexually Abused: Not on file   Housing Stability:     Unable to Pay for Housing in the Last Year: Not on file    Number of [place] in the Last Year: Not on file    Unstable Housing in the Last Year: Not on file       Family History:   Family History   Problem Relation Age of Onset    Diabetes Mother     Heart Disease Father     Kidney Disease Sister         stage 4-kidney failure    Cancer Sister     Heart Disease Sister     Obesity Sister     Cancer Sister     Heart Disease Sister     Obesity Sister     Alcohol Abuse Brother        Review of Systems:   Pertinent positives stated above in HPI. Remainder of 10 point review of systems were reviewed and were negative.     Physical exam:   Constitutional:  VITALS:  BP (!) 170/97   Pulse 94   Temp 98 °F (36.7 °C) (Oral)   Resp 16   SpO2 100%   Gen: alert, awake, ill-appearing  Skin: no rash, turgor wnl  Heent:  eomi, mmm  Neck: no bruits or jvd noted, thyroid normal  Cardiovascular:  S1, S2 without m/r/g  Respiratory: CTA B without w/r/r; respiratory effort normal  Abdomen:  +bs, soft, nt, nd, no hepatosplenomegaly  Ext: + lower extremity edema  Access: Left IJ TDC c/d/i  Psychiatric: mood and affect appropriate; judgement and insight intact  Musculoskeletal:  Rom, muscular strength limited; digits, nails normal    Data/  CBC:   Lab Results   Component Value Date    WBC 13.3 05/29/2022    RBC 3.32 05/29/2022    HGB 9.8 05/29/2022    HCT 29.9 05/29/2022    MCV 90.0 05/29/2022    MCH 29.4 05/29/2022    MCHC 32.7 05/29/2022    RDW 16.5 05/29/2022     05/29/2022    MPV 7.8 05/29/2022     BMP:    Lab Results   Component Value Date     05/29/2022    K 4.5 05/29/2022    CL 87 05/29/2022    CO2 24 05/29/2022    BUN 63 05/29/2022    LABALBU 4.3 05/29/2022    CREATININE 8.0 05/29/2022    CALCIUM 8.5 05/29/2022    GFRAA 9 05/29/2022    GFRAA >60 05/17/2013    LABGLOM 7 05/29/2022    GLUCOSE 51 05/29/2022         Assessment/    - End stage kidney disease - on HD Mon-Wed-Fri    - Acute on chronic respiratory failure r/o sepsis - started on antibiotics    - sCHF - EF ~40%    - Hypertension - poorly controlled    - Anemia of chronic disease      Plan/    - HD tomorrow per schedule, EDW has been reduced to 109.5 kg, leaving under  - DAVON with HD  - Trend labs, bp's      Thank you for the consultation. Please do not hesitate to call with questions. ____________________________________  Brionna Jackson MD  [facility]  [email or web address]  Office: [phone]

## 2022-05-30 LAB
ANION GAP SERPL CALCULATED.3IONS-SCNC: 24 MMOL/L (ref 3–16)
BASOPHILS ABSOLUTE: 0 K/UL (ref 0–0.2)
BASOPHILS RELATIVE PERCENT: 0.4 %
BUN BLDV-MCNC: 69 MG/DL (ref 7–20)
CALCIUM SERPL-MCNC: 8.2 MG/DL (ref 8.3–10.6)
CHLORIDE BLD-SCNC: 87 MMOL/L (ref 99–110)
CO2: 17 MMOL/L (ref 21–32)
CREAT SERPL-MCNC: 8.4 MG/DL (ref 0.9–1.3)
EOSINOPHILS ABSOLUTE: 0 K/UL (ref 0–0.6)
EOSINOPHILS RELATIVE PERCENT: 0.2 %
GFR AFRICAN AMERICAN: 8
GFR NON-AFRICAN AMERICAN: 7
GLUCOSE BLD-MCNC: 108 MG/DL (ref 70–99)
GLUCOSE BLD-MCNC: 138 MG/DL (ref 70–99)
GLUCOSE BLD-MCNC: 138 MG/DL (ref 70–99)
GLUCOSE BLD-MCNC: 165 MG/DL (ref 70–99)
GLUCOSE BLD-MCNC: 90 MG/DL (ref 70–99)
HCT VFR BLD CALC: 25.7 % (ref 40.5–52.5)
HEMOGLOBIN: 8.5 G/DL (ref 13.5–17.5)
LYMPHOCYTES ABSOLUTE: 0.6 K/UL (ref 1–5.1)
LYMPHOCYTES RELATIVE PERCENT: 6.8 %
MCH RBC QN AUTO: 29.9 PG (ref 26–34)
MCHC RBC AUTO-ENTMCNC: 33 G/DL (ref 31–36)
MCV RBC AUTO: 90.5 FL (ref 80–100)
MONOCYTES ABSOLUTE: 0.6 K/UL (ref 0–1.3)
MONOCYTES RELATIVE PERCENT: 6.8 %
NEUTROPHILS ABSOLUTE: 8 K/UL (ref 1.7–7.7)
NEUTROPHILS RELATIVE PERCENT: 85.8 %
PDW BLD-RTO: 16.7 % (ref 12.4–15.4)
PERFORMED ON: ABNORMAL
PERFORMED ON: NORMAL
PLATELET # BLD: 276 K/UL (ref 135–450)
PMV BLD AUTO: 8.5 FL (ref 5–10.5)
POTASSIUM REFLEX MAGNESIUM: 5.1 MMOL/L (ref 3.5–5.1)
RBC # BLD: 2.84 M/UL (ref 4.2–5.9)
SODIUM BLD-SCNC: 128 MMOL/L (ref 136–145)
TROPONIN: 0.09 NG/ML
TROPONIN: 0.09 NG/ML
VANCOMYCIN RANDOM: 20.7 UG/ML
WBC # BLD: 9.4 K/UL (ref 4–11)

## 2022-05-30 PROCEDURE — 1200000000 HC SEMI PRIVATE

## 2022-05-30 PROCEDURE — 99223 1ST HOSP IP/OBS HIGH 75: CPT | Performed by: INTERNAL MEDICINE

## 2022-05-30 PROCEDURE — 6370000000 HC RX 637 (ALT 250 FOR IP): Performed by: INTERNAL MEDICINE

## 2022-05-30 PROCEDURE — 36415 COLL VENOUS BLD VENIPUNCTURE: CPT

## 2022-05-30 PROCEDURE — 94761 N-INVAS EAR/PLS OXIMETRY MLT: CPT

## 2022-05-30 PROCEDURE — 6360000002 HC RX W HCPCS

## 2022-05-30 PROCEDURE — 84484 ASSAY OF TROPONIN QUANT: CPT

## 2022-05-30 PROCEDURE — 94660 CPAP INITIATION&MGMT: CPT

## 2022-05-30 PROCEDURE — 6360000002 HC RX W HCPCS: Performed by: INTERNAL MEDICINE

## 2022-05-30 PROCEDURE — 5A1D70Z PERFORMANCE OF URINARY FILTRATION, INTERMITTENT, LESS THAN 6 HOURS PER DAY: ICD-10-PCS | Performed by: INTERNAL MEDICINE

## 2022-05-30 PROCEDURE — 2500000003 HC RX 250 WO HCPCS: Performed by: INTERNAL MEDICINE

## 2022-05-30 PROCEDURE — 90935 HEMODIALYSIS ONE EVALUATION: CPT

## 2022-05-30 PROCEDURE — 85025 COMPLETE CBC W/AUTO DIFF WBC: CPT

## 2022-05-30 PROCEDURE — 2580000003 HC RX 258: Performed by: INTERNAL MEDICINE

## 2022-05-30 PROCEDURE — 2700000000 HC OXYGEN THERAPY PER DAY

## 2022-05-30 PROCEDURE — 80048 BASIC METABOLIC PNL TOTAL CA: CPT

## 2022-05-30 PROCEDURE — 94640 AIRWAY INHALATION TREATMENT: CPT

## 2022-05-30 PROCEDURE — 80202 ASSAY OF VANCOMYCIN: CPT

## 2022-05-30 RX ORDER — INSULIN LISPRO 100 [IU]/ML
0-6 INJECTION, SOLUTION INTRAVENOUS; SUBCUTANEOUS
Status: DISCONTINUED | OUTPATIENT
Start: 2022-05-30 | End: 2022-06-08 | Stop reason: HOSPADM

## 2022-05-30 RX ORDER — INSULIN LISPRO 100 [IU]/ML
0-3 INJECTION, SOLUTION INTRAVENOUS; SUBCUTANEOUS NIGHTLY
Status: DISCONTINUED | OUTPATIENT
Start: 2022-05-30 | End: 2022-06-08 | Stop reason: HOSPADM

## 2022-05-30 RX ORDER — INSULIN LISPRO 100 [IU]/ML
0-6 INJECTION, SOLUTION INTRAVENOUS; SUBCUTANEOUS
Status: DISCONTINUED | OUTPATIENT
Start: 2022-05-31 | End: 2022-05-30

## 2022-05-30 RX ORDER — DEXTROSE MONOHYDRATE 50 MG/ML
100 INJECTION, SOLUTION INTRAVENOUS PRN
Status: DISCONTINUED | OUTPATIENT
Start: 2022-05-30 | End: 2022-06-08 | Stop reason: HOSPADM

## 2022-05-30 RX ADMIN — ISOSORBIDE DINITRATE 10 MG: 10 TABLET ORAL at 13:32

## 2022-05-30 RX ADMIN — NEPHROCAP 1 MG: 1 CAP ORAL at 13:32

## 2022-05-30 RX ADMIN — EPOETIN ALFA-EPBX 10000 UNITS: 10000 INJECTION, SOLUTION INTRAVENOUS; SUBCUTANEOUS at 10:59

## 2022-05-30 RX ADMIN — METOPROLOL SUCCINATE 50 MG: 50 TABLET, EXTENDED RELEASE ORAL at 13:32

## 2022-05-30 RX ADMIN — CYCLOBENZAPRINE 10 MG: 10 TABLET, FILM COATED ORAL at 20:48

## 2022-05-30 RX ADMIN — METOPROLOL SUCCINATE 50 MG: 50 TABLET, EXTENDED RELEASE ORAL at 20:49

## 2022-05-30 RX ADMIN — OXYCODONE AND ACETAMINOPHEN 1 TABLET: 7.5; 325 TABLET ORAL at 20:48

## 2022-05-30 RX ADMIN — INSULIN LISPRO 1 UNITS: 100 INJECTION, SOLUTION INTRAVENOUS; SUBCUTANEOUS at 16:43

## 2022-05-30 RX ADMIN — CALCIUM ACETATE 667 MG: 667 CAPSULE ORAL at 16:01

## 2022-05-30 RX ADMIN — APIXABAN 5 MG: 5 TABLET, FILM COATED ORAL at 20:49

## 2022-05-30 RX ADMIN — SODIUM CHLORIDE, PRESERVATIVE FREE 10 ML: 5 INJECTION INTRAVENOUS at 20:50

## 2022-05-30 RX ADMIN — CALCIUM ACETATE 667 MG: 667 CAPSULE ORAL at 13:32

## 2022-05-30 RX ADMIN — SODIUM CHLORIDE, PRESERVATIVE FREE 10 ML: 5 INJECTION INTRAVENOUS at 14:48

## 2022-05-30 RX ADMIN — APIXABAN 5 MG: 5 TABLET, FILM COATED ORAL at 13:32

## 2022-05-30 RX ADMIN — PIPERACILLIN AND TAZOBACTAM 3375 MG: 3; .375 INJECTION, POWDER, LYOPHILIZED, FOR SOLUTION INTRAVENOUS at 16:01

## 2022-05-30 RX ADMIN — PRAVASTATIN SODIUM 40 MG: 40 TABLET ORAL at 20:49

## 2022-05-30 RX ADMIN — ISOSORBIDE DINITRATE 10 MG: 10 TABLET ORAL at 20:49

## 2022-05-30 RX ADMIN — CLOPIDOGREL BISULFATE 75 MG: 75 TABLET ORAL at 13:32

## 2022-05-30 RX ADMIN — PIPERACILLIN AND TAZOBACTAM 3375 MG: 3; .375 INJECTION, POWDER, LYOPHILIZED, FOR SOLUTION INTRAVENOUS at 03:24

## 2022-05-30 RX ADMIN — TIOTROPIUM BROMIDE AND OLODATEROL 2 PUFF: 3.124; 2.736 SPRAY, METERED RESPIRATORY (INHALATION) at 07:47

## 2022-05-30 RX ADMIN — OXYCODONE AND ACETAMINOPHEN 1 TABLET: 7.5; 325 TABLET ORAL at 03:30

## 2022-05-30 RX ADMIN — QUETIAPINE FUMARATE 25 MG: 25 TABLET ORAL at 20:49

## 2022-05-30 ASSESSMENT — PAIN DESCRIPTION - LOCATION
LOCATION: BACK
LOCATION: BACK

## 2022-05-30 ASSESSMENT — PAIN DESCRIPTION - ONSET: ONSET: ON-GOING

## 2022-05-30 ASSESSMENT — PAIN DESCRIPTION - FREQUENCY
FREQUENCY: CONTINUOUS
FREQUENCY: CONTINUOUS

## 2022-05-30 ASSESSMENT — PAIN SCALES - GENERAL
PAINLEVEL_OUTOF10: 0
PAINLEVEL_OUTOF10: 8
PAINLEVEL_OUTOF10: 5
PAINLEVEL_OUTOF10: 5
PAINLEVEL_OUTOF10: 8

## 2022-05-30 ASSESSMENT — PAIN DESCRIPTION - DESCRIPTORS
DESCRIPTORS: ACHING
DESCRIPTORS: ACHING

## 2022-05-30 ASSESSMENT — PAIN DESCRIPTION - PAIN TYPE
TYPE: CHRONIC PAIN
TYPE: CHRONIC PAIN

## 2022-05-30 ASSESSMENT — PAIN DESCRIPTION - ORIENTATION
ORIENTATION: LOWER;MID
ORIENTATION: LOWER;MID

## 2022-05-30 NOTE — PROGRESS NOTES
05/30/22 0002   NIV Type   $NIV $Daily Charge   Equipment Type v60   Mode AVAPS   Mask Type Full face mask   Mask Size Large   Settings/Measurements   CPAP/EPAP 6 cmH2O   IPAP Min 18 cmH2O   IPAP Max 25 cmH2O   Vt (Set, mL) 700 mL   Rate Ordered 15   Resp 16   FiO2  30 %   I Time/ I Time % 1 s   Vt Exhaled 770 mL   Comfort Level Good   Using Accessory Muscles No   Alarm Settings   Alarms On Y   Low Pressure 6 cmH2O   High Pressure  40 cmH2O   Apnea (secs) 20 secs   RR Low  6   RR High 40 br/min

## 2022-05-30 NOTE — CARE COORDINATION
CASE MANAGEMENT INITIAL ASSESSMENT      Reviewed chart and completed assessment with patient:  Family present: none  Explained Case Management role/services. To patient     Primary contact information:See below     Health Care Decision Maker :   Primary Decision Maker: Crystal Ann - Spouse - [phone]    Secondary Decision Maker: Gen Jones - Brother/Sister - [phone]          Can this person be reached and be able to respond quickly, such as within a few minutes or hours? Yes      Admit date/status:inpatient 5/29/2022  Diagnosis:shortness of breath  Is this a Readmission?:  No      Insurance:[employer] [place] of [place] [id number] required for SNF: Yes       3 night stay required: No    Living arrangements, Adls, care needs, prior to admission:Lives at home with spouse, uses walker but still can drive    Durable Medical Equipment at home:  Walker_x_Cane__RTS__ BSC__Shower Chair__  02_x [employer] [facility]_ HHN__ CPAP__  BiPap__  Hospital Bed__ W/C___ Other_____    Services in the home and/or outpatient, prior to admission:none    Current PCP:Salvador Swain                                Medications: Prescription coverage:has coverage  Transportation needs: Family    PT/OT recs:Not seen yet this admission    Hospital Exemption Notification (HEN):NA    Barriers to discharge:None identified    Plan/comments: To return home with family at discharge.  Will follow for needs     ECOC on chart for MD signature

## 2022-05-30 NOTE — PROGRESS NOTES
Hospitalist Progress Note      PCP: Fabiano Ball MD    Date of Admission: 5/29/2022    Chief Complaint: confusion at home       Subjective:  He is now thinking clearly. Still more dyspneic than normal, with extreme orthopnea. Nephrology removing fluid with HD.        Medications:  Reviewed    Infusion Medications    dextrose      sodium chloride      dextrose       Scheduled Medications    epoetin siddharth-epbx  10,000 Units IntraVENous Q MWF    [START ON 5/31/2022] insulin lispro  0-6 Units SubCUTAneous TID WC    insulin lispro  0-3 Units SubCUTAneous Nightly    mupirocin   Topical Daily    QUEtiapine  25 mg Oral Nightly    apixaban  5 mg Oral BID    b complex-C-folic acid  1 mg Oral Daily    calcium acetate  1 capsule Oral TID WC    clopidogrel  75 mg Oral Daily    docusate sodium  100 mg Oral Daily    DULoxetine  30 mg Oral Daily    isosorbide dinitrate  10 mg Oral TID    tiotropium-olodaterol  2 puff Inhalation Daily    sennosides-docusate sodium  1 tablet Oral Daily    pravastatin  40 mg Oral Nightly    pantoprazole  40 mg Oral QAM AC    metoprolol succinate  50 mg Oral BID    sodium chloride flush  5-40 mL IntraVENous 2 times per day    piperacillin-tazobactam  3,375 mg IntraVENous Q12H    vancomycin (VANCOCIN) intermittent dosing (placeholder)   Other RX Placeholder     PRN Meds: glucose, dextrose bolus **OR** dextrose bolus, glucagon (rDNA), dextrose, albuterol sulfate HFA, calcium carbonate, cyclobenzaprine, ipratropium-albuterol, nitroGLYCERIN, mineral oil, oxyCODONE-acetaminophen, sodium chloride flush, sodium chloride, ondansetron **OR** ondansetron, polyethylene glycol, acetaminophen **OR** acetaminophen, hydrOXYzine, glucose, dextrose bolus **OR** dextrose bolus, glucagon (rDNA), dextrose, traZODone      Intake/Output Summary (Last 24 hours) at 5/30/2022 1242  Last data filed at 5/30/2022 0416  Gross per 24 hour   Intake 1042.48 ml   Output 370 ml   Net 672.48 ml Physical Exam Performed:    BP (!) 180/96   Pulse 79   Temp 97.7 °F (36.5 °C)   Resp 22   Wt 247 lb 5.7 oz (112.2 kg)   SpO2 100%   BMI 39.92 kg/m²       General appearance: No apparent distress, appears stated age and cooperative. HEENT: Pupils equal, round, and reactive to light. Conjunctivae/corneas clear. Neck: Supple, with full range of motion. No jugular venous distention. Trachea midline. Respiratory:  Normal respiratory effort. Clear to auscultation, bilaterally without Rales/Wheezes/Rhonchi. Diminished throughout. Cardiovascular: Regular rate and rhythm with normal S1/S2 without murmurs, rubs or gallops. Abdomen: Soft, non-tender, non-distended with normal bowel sounds. Musculoskeletal: No clubbing, cyanosis or edema bilaterally. Full range of motion without deformity. Skin: Skin color, texture, turgor normal.  No rashes or lesions. Neurologic:  Neurovascularly intact without any focal sensory/motor deficits. Cranial nerves: II-XII intact, grossly non-focal.  Psychiatric: Alert and oriented, thought content appropriate, normal insight. Anxious affect.    Capillary Refill: Brisk,3 seconds, normal   Peripheral Pulses: +2 palpable, equal bilaterally       Labs:   Recent Labs     05/29/22  0944 05/30/22  0450   WBC 13.3* 9.4   HGB 9.8* 8.5*   HCT 29.9* 25.7*    276     Recent Labs     05/29/22  0944 05/30/22  0450   * 128*   K 4.5 5.1   CL 87* 87*   CO2 24 17*   BUN 63* 69*   CREATININE 8.0* 8.4*   CALCIUM 8.5 8.2*     Recent Labs     05/29/22  0944   AST 13*   ALT 10   BILITOT 0.3   ALKPHOS 100     Recent Labs     05/29/22  1155   INR 1.21*     Recent Labs     05/29/22  1823 05/29/22  2358 05/30/22  0450   TROPONINI 0.10* 0.09* 0.09*       Urinalysis:      Lab Results   Component Value Date    NITRU Negative 05/29/2022    WBCUA 10-20 05/29/2022    BACTERIA 3+ 05/29/2022    RBCUA 5-10 05/29/2022    BLOODU TRACE-INTACT 05/29/2022    SPECGRAV 1.015 05/29/2022    GLUCOSEU 100 05/29/2022       Radiology:  CT HEAD WO CONTRAST   Final Result   No acute intracranial abnormality. CT CHEST PULMONARY EMBOLISM W CONTRAST   Final Result   Negative for acute pulmonary embolus. Bilateral patchy areas of atelectasis or infiltrate in the lungs most   pronounced in the left lower lobe. Bilateral pleural effusions (left greater than right). Mediastinal adenopathy possibly reactive in nature. Recommend follow-up CT   chest in 1-3 months to confirm resolution unless clinically indicated sooner. XR CHEST PORTABLE   Final Result   1. Central predominant edema potentially of cardiac or noncardiogenic origin. Pneumonia could appear similar. 2. Increased left basilar airspace opacity due in part to passive atelectasis   given at least trace to small left pleural effusion. Underlying rounded   atelectasis is likely present given the prior CT appearance, and superimposed   pneumonia and aspiration are not excluded. 3. At least trace right pleural effusion. Assessment/Plan:    Active Hospital Problems    Diagnosis     Hypoglycemia [E16.2]      Priority: Medium    History of transcatheter aortic valve replacement (TAVR) [Z95.2]      Priority: Medium    Elevated troponin [R77.8]        \"46 y.o. male  with multiple medical issueswho [facility]sob[facility]chest pain. Pt states he has chronic sob and chest pain(has element of anxiety) and yesterday he developed inc'd sob that was fairly sudden onset and continued into the night. He also noted ongoing substernal chest pain, nonradiating that would last a few hours(would subside on its own with deep breathing). \"      HCAP, with sepsis present on arrival.  No guiding culture data. Empiric vanc and zosyn. Planning PO abx upon discharge. ESRD on HD. Nephrology removing fluid, lowered target weight. Chronic hypoxic respiratory failure. Baseline 3-4LNC.     Chest pain, with known h/o CAD. No further workup per cardiology. Continue clopidogrel, statin, metoprolol, ISDN. DM2. Complicated by hypoglycemia on admission, with acute metabolic encephalopathy. Reduced insulin regimen. F/u with PCP. PAF. Apixaban, metoprolol. DVT Prophylaxis: anticoagulation as above  Diet: ADULT DIET; Regular; Low Sodium (2 gm); Low Phosphorus (Less than 1000 mg)  Code Status: Full Code    PT/OT Eval Status: not indicated    Dispo - home when he is breathing more easily. Perhaps 5/31 - 6/1.         Esteban Cobb MD

## 2022-05-30 NOTE — PROGRESS NOTES
05/29/22 2118   NIV Type   Equipment Type v60   Mode AVAPS   Mask Type Full face mask   Mask Size Large   Settings/Measurements   CPAP/EPAP 6 cmH2O   IPAP Min 18 cmH2O   IPAP Max 25 cmH2O   Vt (Set, mL) 700 mL   Rate Ordered 15   Resp 18   FiO2  35 %  (decrteased to 30%)   I Time/ I Time % 1 s   Vt Exhaled 979 mL   Comfort Level Good   Using Accessory Muscles No   SpO2 99  (pt placed on pulse ox at bedside)   Alarm Settings   Alarms On Y   Low Pressure 6 cmH2O   High Pressure  40 cmH2O   Apnea (secs) 20 secs   RR Low  6   RR High 40 br/min

## 2022-05-30 NOTE — PROGRESS NOTES
[facility] Progress Note           Subjective/   45y.o. year old male who we are seeing in consultation for ESKD on HD. HPI:  Seen on dialysis. Orders confirmed. Pre-weight at HD today 112.2 kg. States shortness of breath better, on 4-5 L NC, uses 3 L NC at home. ROS:  +weak, intake reduced. Objective/   GEN:  Chronically ill, BP (!) 180/96   Pulse 79   Temp 97.7 °F (36.5 °C)   Resp 22   Wt 247 lb 5.7 oz (112.2 kg)   SpO2 100%   BMI 39.92 kg/m²   HEENT: non-icteric, no JVD  CV: S1, S2 without m/r/g; no LE edema  RESP: CTA B without w/r/r; breathing wnl  ABD: +bs, soft, nt, no hsm  SKIN: warm, no rashes  ACCESS: Left IJ [facility]    Data/  Recent Labs     05/29/22  0944 05/30/22  0450   WBC 13.3* 9.4   HGB 9.8* 8.5*   HCT 29.9* 25.7*   MCV 90.0 90.5    276     Recent Labs     05/29/22  0944 05/29/22  1017 05/30/22  0450   *  --  128*   K 4.5  --  5.1   CL 87*  --  87*   CO2 24  --  17*   GLUCOSE 59* 51 138*   BUN 63*  --  69*   CREATININE 8.0*  --  8.4*   LABGLOM 7*  --  7*   GFRAA 9*  --  8*       Assessment/     - End stage kidney disease - on HD Mon-Wed-Fri     - Acute on chronic respiratory failure r/o sepsis - started on antibiotics     - sCHF - EF ~40%, decompensated     - Hypertension - poorly controlled     - Anemia of chronic disease - Epogen 7,400 units qHD as outpatient       Plan/     - HD today per schedule with UF as tolerated, EDW has been reduced to 109.5 kg, leaving under  - DAVON with HD - increase dose to 10K qHD  - Trend labs, bp's, cultures     ____________________________________  Diamond Davidson MD  [facility]  [email or web address]  Office: [phone]

## 2022-05-30 NOTE — PROGRESS NOTES
Treatment time: 210min    Net UF: 3100ml    Pre weight: 112.5  Post weight: 109k  EDW: 109.5k    Access used: RIJ TDC  Access function: Good    Medications or blood products given: Epogen [id number] IV    Regular outpatient schedule: MWF    Summary of response to treatment: Tolerated well. Copy of dialysis treatment record placed in chart, to be scanned into EMR.

## 2022-05-31 LAB
ANION GAP SERPL CALCULATED.3IONS-SCNC: 16 MMOL/L (ref 3–16)
BUN BLDV-MCNC: 39 MG/DL (ref 7–20)
CALCIUM SERPL-MCNC: 8.4 MG/DL (ref 8.3–10.6)
CHLORIDE BLD-SCNC: 96 MMOL/L (ref 99–110)
CO2: 20 MMOL/L (ref 21–32)
CREAT SERPL-MCNC: 5.7 MG/DL (ref 0.9–1.3)
GFR AFRICAN AMERICAN: 13
GFR NON-AFRICAN AMERICAN: 10
GLUCOSE BLD-MCNC: 116 MG/DL (ref 70–99)
GLUCOSE BLD-MCNC: 128 MG/DL (ref 70–99)
GLUCOSE BLD-MCNC: 130 MG/DL (ref 70–99)
GLUCOSE BLD-MCNC: 63 MG/DL (ref 70–99)
GLUCOSE BLD-MCNC: 65 MG/DL (ref 70–99)
HCT VFR BLD CALC: 26.2 % (ref 40.5–52.5)
HEMOGLOBIN: 8.7 G/DL (ref 13.5–17.5)
MCH RBC QN AUTO: 29.9 PG (ref 26–34)
MCHC RBC AUTO-ENTMCNC: 33 G/DL (ref 31–36)
MCV RBC AUTO: 90.5 FL (ref 80–100)
PDW BLD-RTO: 16.6 % (ref 12.4–15.4)
PERFORMED ON: ABNORMAL
PLATELET # BLD: 298 K/UL (ref 135–450)
PMV BLD AUTO: 8 FL (ref 5–10.5)
POTASSIUM REFLEX MAGNESIUM: 4.3 MMOL/L (ref 3.5–5.1)
RBC # BLD: 2.9 M/UL (ref 4.2–5.9)
SODIUM BLD-SCNC: 132 MMOL/L (ref 136–145)
URINE CULTURE, ROUTINE: NORMAL
VANCOMYCIN RANDOM: 13 UG/ML
WBC # BLD: 9.4 K/UL (ref 4–11)

## 2022-05-31 PROCEDURE — 80048 BASIC METABOLIC PNL TOTAL CA: CPT

## 2022-05-31 PROCEDURE — 94640 AIRWAY INHALATION TREATMENT: CPT

## 2022-05-31 PROCEDURE — 6370000000 HC RX 637 (ALT 250 FOR IP): Performed by: INTERNAL MEDICINE

## 2022-05-31 PROCEDURE — 2580000003 HC RX 258: Performed by: INTERNAL MEDICINE

## 2022-05-31 PROCEDURE — 94660 CPAP INITIATION&MGMT: CPT

## 2022-05-31 PROCEDURE — 2500000003 HC RX 250 WO HCPCS: Performed by: INTERNAL MEDICINE

## 2022-05-31 PROCEDURE — 36415 COLL VENOUS BLD VENIPUNCTURE: CPT

## 2022-05-31 PROCEDURE — 80202 ASSAY OF VANCOMYCIN: CPT

## 2022-05-31 PROCEDURE — 94761 N-INVAS EAR/PLS OXIMETRY MLT: CPT

## 2022-05-31 PROCEDURE — 6360000002 HC RX W HCPCS: Performed by: INTERNAL MEDICINE

## 2022-05-31 PROCEDURE — 2700000000 HC OXYGEN THERAPY PER DAY

## 2022-05-31 PROCEDURE — 99232 SBSQ HOSP IP/OBS MODERATE 35: CPT | Performed by: INTERNAL MEDICINE

## 2022-05-31 PROCEDURE — 1200000000 HC SEMI PRIVATE

## 2022-05-31 PROCEDURE — 85027 COMPLETE CBC AUTOMATED: CPT

## 2022-05-31 RX ADMIN — Medication 10 ML: at 03:21

## 2022-05-31 RX ADMIN — DOCUSATE SODIUM 50 MG AND SENNOSIDES 8.6 MG 1 TABLET: 8.6; 5 TABLET, FILM COATED ORAL at 08:26

## 2022-05-31 RX ADMIN — NEPHROCAP 1 MG: 1 CAP ORAL at 08:26

## 2022-05-31 RX ADMIN — OXYCODONE AND ACETAMINOPHEN 1 TABLET: 7.5; 325 TABLET ORAL at 19:54

## 2022-05-31 RX ADMIN — DULOXETINE HYDROCHLORIDE 30 MG: 30 CAPSULE, DELAYED RELEASE ORAL at 08:26

## 2022-05-31 RX ADMIN — PIPERACILLIN AND TAZOBACTAM 3375 MG: 3; .375 INJECTION, POWDER, LYOPHILIZED, FOR SOLUTION INTRAVENOUS at 03:39

## 2022-05-31 RX ADMIN — VANCOMYCIN HYDROCHLORIDE 500 MG: 500 INJECTION, POWDER, LYOPHILIZED, FOR SOLUTION INTRAVENOUS at 13:51

## 2022-05-31 RX ADMIN — METOPROLOL SUCCINATE 50 MG: 50 TABLET, EXTENDED RELEASE ORAL at 08:26

## 2022-05-31 RX ADMIN — CALCIUM ACETATE 667 MG: 667 CAPSULE ORAL at 16:53

## 2022-05-31 RX ADMIN — MUPIROCIN: 20 OINTMENT TOPICAL at 08:27

## 2022-05-31 RX ADMIN — DOCUSATE SODIUM 100 MG: 100 CAPSULE, LIQUID FILLED ORAL at 08:26

## 2022-05-31 RX ADMIN — CALCIUM ACETATE 667 MG: 667 CAPSULE ORAL at 08:26

## 2022-05-31 RX ADMIN — CLOPIDOGREL BISULFATE 75 MG: 75 TABLET ORAL at 08:26

## 2022-05-31 RX ADMIN — PRAVASTATIN SODIUM 40 MG: 40 TABLET ORAL at 22:09

## 2022-05-31 RX ADMIN — CYCLOBENZAPRINE 10 MG: 10 TABLET, FILM COATED ORAL at 03:36

## 2022-05-31 RX ADMIN — QUETIAPINE FUMARATE 25 MG: 25 TABLET ORAL at 22:09

## 2022-05-31 RX ADMIN — OXYCODONE AND ACETAMINOPHEN 1 TABLET: 7.5; 325 TABLET ORAL at 03:37

## 2022-05-31 RX ADMIN — CALCIUM ACETATE 667 MG: 667 CAPSULE ORAL at 13:54

## 2022-05-31 RX ADMIN — METOPROLOL SUCCINATE 50 MG: 50 TABLET, EXTENDED RELEASE ORAL at 22:09

## 2022-05-31 RX ADMIN — ISOSORBIDE DINITRATE 10 MG: 10 TABLET ORAL at 22:09

## 2022-05-31 RX ADMIN — APIXABAN 5 MG: 5 TABLET, FILM COATED ORAL at 22:09

## 2022-05-31 RX ADMIN — OXYCODONE AND ACETAMINOPHEN 1 TABLET: 7.5; 325 TABLET ORAL at 08:26

## 2022-05-31 RX ADMIN — TIOTROPIUM BROMIDE AND OLODATEROL 2 PUFF: 3.124; 2.736 SPRAY, METERED RESPIRATORY (INHALATION) at 07:57

## 2022-05-31 RX ADMIN — APIXABAN 5 MG: 5 TABLET, FILM COATED ORAL at 08:26

## 2022-05-31 RX ADMIN — PANTOPRAZOLE SODIUM 40 MG: 40 TABLET, DELAYED RELEASE ORAL at 08:27

## 2022-05-31 RX ADMIN — ISOSORBIDE DINITRATE 10 MG: 10 TABLET ORAL at 08:26

## 2022-05-31 RX ADMIN — PIPERACILLIN AND TAZOBACTAM 3375 MG: 3; .375 INJECTION, POWDER, LYOPHILIZED, FOR SOLUTION INTRAVENOUS at 13:54

## 2022-05-31 RX ADMIN — ISOSORBIDE DINITRATE 10 MG: 10 TABLET ORAL at 13:53

## 2022-05-31 RX ADMIN — OXYCODONE AND ACETAMINOPHEN 1 TABLET: 7.5; 325 TABLET ORAL at 13:53

## 2022-05-31 RX ADMIN — SODIUM CHLORIDE, PRESERVATIVE FREE 10 ML: 5 INJECTION INTRAVENOUS at 08:28

## 2022-05-31 ASSESSMENT — PAIN SCALES - GENERAL
PAINLEVEL_OUTOF10: 0
PAINLEVEL_OUTOF10: 4
PAINLEVEL_OUTOF10: 9
PAINLEVEL_OUTOF10: 3
PAINLEVEL_OUTOF10: 8

## 2022-05-31 ASSESSMENT — PAIN DESCRIPTION - ORIENTATION: ORIENTATION: RIGHT;LEFT;LOWER

## 2022-05-31 ASSESSMENT — PAIN DESCRIPTION - LOCATION: LOCATION: BACK

## 2022-05-31 ASSESSMENT — PAIN DESCRIPTION - DESCRIPTORS: DESCRIPTORS: ACHING;SORE

## 2022-05-31 NOTE — CONSULTS
[address] [facility]  [phone]      Attending Physician: Mary Pimentel MD  Reason for Consultation/Chief Complaint: Confusion, chest pain, shortness of breath    Subjective   History of Present Illness:  Ike Sheikh is a 45 y.o. patient who presented to the hospital with complaints of confusion, chest pain and shortness of breath, he says he feels about the same since admission, he presented to the hospital on May 29, 2022 primarily with increased shortness of breath. In the course of work-up, it was noted the patient was hypoglycemic, there was no clear evidence for medication noncompliance. It was noted that he had chronic respiratory failure, has sleep apnea with CPAP and is on 3 to 4 L of oxygen at baseline. Was felt he had possible pneumonia with sepsis and is being treated for that. Additionally, work-up revealed elevated troponin level of 0.12 and this down trended to 0.10 and then 0.09.  proBNP level was above 70,000. He is status post recent hospital encounter on May 25, 2022 for similar complaints and according biomarkers were also noted to be similarly elevated, he was able to be discharged at that time. Chronically, he does have dm/ESRD, is on dialysis and has been compliant with that. He has a cardiac history which dates back to at least 2013 and has been diagnosed with nonischemic cardiomyopathy, he typically follows with Dr. Karen Goff in the office. He did have more significant CAD detected in 2015 with LAD PCI at that time. He saw Dr. Karen Goff in 2019 in follow-up for preop evaluation for possible kidney transplant he was noted to have significant aortic stenosis, and he was referred to the TAVR team and underwent TAVR in 2019.   His last office visit was in February 2022, it was noted he had been hospitalized in January to 20 with shortness of breath and elevated troponin as well as abnormal stress test underwent catheterization and found to have progressive RCA disease treated with drug-eluting stent x1. He is also had a history of peripheral vascular's, is followed with Dr. Joce Keyes and has had PTA to the right iliac artery. His target dry weight has been felt to be around 119 kg. Current weight is 109 kg. Additionally, patient has had paroxysmal atrial fibrillation, has been on Eliquis, has had a history of stroke. Past Medical History:   has a past medical history of Ambulatory dysfunction, Aortic stenosis, Arthritis, Asthma, Bilateral hilar adenopathy syndrome, CAD (coronary artery disease), Cardiomyopathy ([address] CHF (congestive heart failure) ([address] Chronic pain, COPD (chronic obstructive pulmonary disease) ([address] Depression, Diabetes mellitus ([address] Difficult intravenous access, Emphysema of lung ([address] ESRD (end stage renal disease) on dialysis ([address] Fear of needles, Gastric ulcer, GERD (gastroesophageal reflux disease), Heart valve problem, Hemodialysis patient ([address] History of spinal fracture, Hx of blood clots, Hyperlipidemia, Hypertension, MI (myocardial infarction) ([address] Neuromuscular disorder ([address] Numbness and tingling in left arm, Pneumonia, PONV (postoperative nausea and vomiting), Prolonged emergence from general anesthesia, Sleep apnea, Stroke ([address] TIA (transient ischemic attack), and Unspecified diseases of blood and blood-forming organs. Surgical History:   has a past surgical history that includes Tonsillectomy; cyst removal (08/14/2013); Colonoscopy; Coronary angioplasty with stent (05/26/15); vascular surgery (aprx 2 years ago); Colonoscopy (2/29/2015); Upper gastrointestinal endoscopy (01/06/2016); Upper gastrointestinal endoscopy (01/29/2017); other surgical history (02/01/2017); Dialysis fistula creation (Left, 10/30/2017);  Diagnostic Cardiac Cath Lab Procedure; other surgical history (2018); other surgical history (Bilateral, 06/26/2018); pr lap insertion tunneled intraperitoneal catheter (N/A, 9/21/2018); other 3 times daily (before meals) 4/27/22   Mirlande Goyal MD   QUEtiapine (SEROQUEL) 25 MG tablet Take 1 tablet by mouth nightly 4/27/22   Mirlande Goyal MD   docusate sodium (DOK) 100 MG capsule Take 1 capsule by mouth daily 4/27/22   Mirlande Goyal MD   LINZESS 145 MCG capsule TAKE 1 CAPSULE BY MOUTH IN THE MORNING BEFORE BREAKFAST 4/27/22   Luz Marina Nguyen MD   fluticasone (FLONASE) 50 MCG/ACT nasal spray SHAKE LIQUID AND USE 2 SPRAYS IN [facility] NOSTRIL DAILY 4/17/22   Luz Marina Nguyen MD   DULoxetine (CYMBALTA) 30 MG extended release capsule TAKE 1 CAPSULE BY MOUTH EVERY DAY 3/29/22   Luz Marina Nguyen MD   tiZANidine (ZANAFLEX) 4 MG tablet TAKE 1 TABLET BY MOUTH THREE TIMES DAILY 3/29/22   Luz Marina Nguyen MD   mineral oil liquid Take 30 mLs by mouth daily as needed for Constipation 3/9/22   Luz Marina Nguyen MD   sennosides-docusate sodium (SENOKOT-S) 8.6-50 MG tablet Take 1 tablet by mouth daily 3/9/22   Luz Marina Nguyen MD   metoprolol succinate (TOPROL XL) 50 MG extended release tablet Take 1 tablet by mouth in the morning and at bedtime 3/3/22   Jose Graham MD   apixaban Korene Cava) 5 MG TABS tablet Take 1 tablet by mouth 2 times daily 3/3/22   Jose Graham MD   pravastatin (PRAVACHOL) 40 MG tablet Take 1 tablet by mouth daily 2/10/22   Devi Points, APRN - CNP   insulin glargine [facility]) 100 UNIT/ML injection pen Inject 30 Units into the skin nightly 1/17/22   Liv Carranza MD   Continuous Blood Gluc Sensor (DEXCOM G6 SENSOR) MISC Every 10 days 10/5/21   Luz Marina Nguyen MD   Continuous Blood Gluc Transmit (DEXCOM G6 TRANSMITTER) MISC 1 each by Does not apply route every 3 months 10/5/21   Luz Marina Nguyen MD   Continuous Blood Gluc  ([address]) [address] 1 each by Does not apply route Daily with lunch 10/5/21   Luz Marina Nguyen MD   B Complex-C-Folic Acid (VIRT-CAPS) 1 MG CAPS TAKE 1 CAPSULE BY MOUTH EVERY DAY 9/20/21   Luz Marina Nguyen MD   Calcium Acetate, Phos Binder, 667 MG CAPS TAKE 1 CAPSULE BY MOUTH THREE TIMES DAILY WITH MEALS 8/12/21   Yaw Kitchen MD   nitroGLYCERIN (NITROSTAT) 0.4 MG SL tablet DISSOLVE 1 TABLET UNDER THE TONGUE AS NEEDED FOR CHEST PAIN EVERY 5 MINUTES UP TO 3 TIMES. IF NO RELIEF CALL 911. 1/7/21   Yaw Kitchen MD   vitamin D (ERGOCALCIFEROL) 12845 units CAPS capsule TK 1 C PO WEEKLY 6/2/19   Historical Provider, MD   Tiotropium Bromide-Olodaterol (STIOLTO RESPIMAT) 2.5-2.5 MCG/ACT AERS Inhale 2 puffs into the lungs daily 5/21/19   Nitin Palafox MD   Blood Glucose Monitoring Suppl ADAM USE AS DIRECTED. 4/25/18   Jane Garsia MD   Alcohol Swabs PADS USE AS DIRECTED 4/25/18   Jane Garsia MD   albuterol sulfate  (90 Base) MCG/ACT inhaler Inhale 2 puffs into the lungs every 6 hours as needed for Wheezing 11/8/17   Spero Koyanagi, MD   ipratropium-albuterol (DUONEB) 0.5-2.5 (3) MG/3ML SOLN nebulizer solution Inhale 3 mLs into the lungs every 6 hours as needed for Shortness of Breath 10/15/17   Clarissa Castro MD   calcium carbonate (TUMS) 500 MG chewable tablet Take 1 tablet by mouth 3 times daily as needed for Heartburn.     Historical Provider, MD        CURRENT Medications:  epoetin siddharth-epbx (RETACRIT) injection 10,000 Units, Q MWF  glucose chewable tablet 16 g, PRN  dextrose bolus 10% 125 mL, PRN   Or  dextrose bolus 10% 250 mL, PRN  glucagon (rDNA) injection 1 mg, PRN  dextrose 5 % solution, PRN  insulin lispro (HUMALOG) injection vial 0-3 Units, Nightly  insulin lispro (HUMALOG) injection vial 0-6 Units, TID WC  mupirocin (BACTROBAN) 2 % ointment, Daily  QUEtiapine (SEROQUEL) tablet 25 mg, Nightly  albuterol sulfate  (90 Base) MCG/ACT inhaler 2 puff, Q6H PRN  apixaban (ELIQUIS) tablet 5 mg, BID  b complex-C-folic acid (NEPHROCAPS) capsule 1 mg, Daily  calcium acetate (PHOSLO) capsule 667 mg, TID WC  clopidogrel (PLAVIX) tablet 75 mg, Daily  calcium carbonate (TUMS) chewable tablet 500 mg, TID PRN  cyclobenzaprine (FLEXERIL) tablet 10 mg, TID PRN  docusate sodium (COLACE) capsule 100 mg, Daily  DULoxetine (CYMBALTA) extended release capsule 30 mg, Daily  ipratropium-albuterol (DUONEB) nebulizer solution 3 mL, Q6H PRN  isosorbide dinitrate (ISORDIL) tablet 10 mg, TID  tiotropium-olodaterol (STIOLTO) 2.5-2.5 MCG/ACT inhaler 2 puff, Daily  sennosides-docusate sodium (SENOKOT-S) 8.6-50 MG tablet 1 tablet, Daily  pravastatin (PRAVACHOL) tablet 40 mg, Nightly  pantoprazole (PROTONIX) tablet 40 mg, QAM AC  metoprolol succinate (TOPROL XL) extended release tablet 50 mg, BID  nitroGLYCERIN (NITROSTAT) SL tablet 0.4 mg, Q5 Min PRN  mineral oil liquid 30 mL, Daily PRN  oxyCODONE-acetaminophen (PERCOCET) 7.5-325 MG per tablet 1 tablet, Q6H PRN  sodium chloride flush 0.9 % injection 5-40 mL, 2 times per day  sodium chloride flush 0.9 % injection 5-40 mL, PRN  0.9 % sodium chloride infusion, PRN  ondansetron (ZOFRAN-ODT) disintegrating tablet 4 mg, Q8H PRN   Or  ondansetron (ZOFRAN) injection 4 mg, Q6H PRN  polyethylene glycol (GLYCOLAX) packet 17 g, Daily PRN  acetaminophen (TYLENOL) tablet 650 mg, Q6H PRN   Or  acetaminophen (TYLENOL) suppository 650 mg, Q6H PRN  piperacillin-tazobactam (ZOSYN) 3,375 mg in dextrose 5 % 50 mL IVPB extended infusion (mini-bag), Q12H  hydrOXYzine (ATARAX) tablet 10 mg, TID PRN  traZODone (DESYREL) tablet 50 mg, Nightly PRN  vancomycin (VANCOCIN) intermittent dosing (placeholder), RX Placeholder        Allergies:  Morphine     Review of Systems:   A 14 point review of symptoms completed. Pertinent positives identified in the HPI, all other review of symptoms negative as below.       Objective   PHYSICAL EXAM:    Vitals:    05/31/22 0830   BP: (!) 157/97   Pulse: 66   Resp: 20   Temp: 98 °F (36.7 °C)   SpO2: 98%    Weight: 240 lb 4.8 oz (109 kg)         General Appearance:  Alert, cooperative, no distress, appears stated age, not able to light quite fully flat and speak in full sentences   Head: Normocephalic, without obvious abnormality, atraumatic   Eyes:  PERRL, conjunctiva/corneas clear   Nose: Nares normal, no drainage or sinus tenderness   Throat: Lips, mucosa, and tongue normal   Neck: Supple, elevated JVP   Lungs:   Clear to auscultation bilaterally, respirations unlabored   Chest Wall:   Dialysis catheter in left chest wall   Heart:  Regular rate and rhythm, S1, S2 normal, 2 out of 6 systolic murmur   Abdomen:   Soft, non-tender, bowel sounds active all four quadrants,  no masses, no organomegaly   Extremities: Extremities without edema however there is hyperpigmentation   Pulses: 2+ and symmetric   Skin: Skin color, texture, turgor normal, no rashes or lesions   Pysch: Normal mood and affect   Neurologic: Normal gross motor and sensory exam.         Labs   CBC:   Lab Results   Component Value Date    WBC 9.4 05/31/2022    RBC 2.90 05/31/2022    HGB 8.7 05/31/2022    HCT 26.2 05/31/2022    MCV 90.5 05/31/2022    RDW 16.6 05/31/2022     05/31/2022     CMP:  Lab Results   Component Value Date     05/31/2022    K 4.3 05/31/2022    CL 96 05/31/2022    CO2 20 05/31/2022    BUN 39 05/31/2022    CREATININE 5.7 05/31/2022    GFRAA 13 05/31/2022    GFRAA >60 05/17/2013    AGRATIO 1.5 05/29/2022    LABGLOM 10 05/31/2022    GLUCOSE 65 05/31/2022    PROT 7.1 05/29/2022    PROT 6.7 12/27/2012    CALCIUM 8.4 05/31/2022    BILITOT 0.3 05/29/2022    ALKPHOS 100 05/29/2022    AST 13 05/29/2022    ALT 10 05/29/2022     PT/INR:  No results found for: PTINR  HgBA1c:  Lab Results   Component Value Date    LABA1C 6.6 04/18/2022     Lab Results   Component Value Date    CKTOTAL 45 05/25/2022    TROPONINI 0.09 (H) 05/30/2022         Cardiac Data     Last EKG:    probably normal sinus rhythm, baseline artifact,       Cardiac Imaging:  RIGHT RADIAL ARTERY U/S 1/17/2022  Impressions Right Impression The right radial artery demonstrates normal triphasic flow with no evidence of pseudoaneurysm.  The right radial veins demonstrate normal phasic flow with no evidence of thrombosis.  Conclusions   Summary   Normal right radial artery and veins.          Coronary angiogram 1/14/2022:  1. Left heart catheterization  2. Selective left and right coronary angiogram  3. PCI of the RCA with PATRICIA  Procedure Findings:  1. 99% mid RCA  2. 60-70% CIRC and DIAG  3. Elevated left heart hemodynamics              ~LVEDP 30       Details:              Igor Ann was brought to the cardiac catheterization lab in a fasting state after informed consent was obtained. If the patient was able to provide written consent, it was obtained.              LHI patient's vitals were monitored through out the procedure. The patient was sedated using the appropriate levels of sedation and ASA guidelines.              The appropriate access site area was prepped and drapped in a sterile fashion. The area was anesthetized with 2% lidocaine. Using the modified Seldinger technique, an arterial sheath was introduced into the arterial access site using a single anterior wall puncture. The sheath was flushed and prepped in usual fashion.              Catheters used during the procedure included 5 Hungarian TIG 4.0 catheter. The catheters were advanced and removed over a .035\" wire, into the appropriate positions. Multiple angiographic views were obtained.  An LV gram was not obtained.              ~The interventional portion of the procedure was completed utilizing a multipurpose 6 [place] guide.  Multipurpose guide was advanced into the right coronary ostium.  A gentleman therapy aspirin, Plavix, Angiomax was initiated.  A BMW wire was advanced into the distal right coronary artery.  The lesion was initially predilated with a 2.75 x 15 mm Euphora balloon followed by a 3.5 mm x 20 mm Euphora balloon and subsequently stented with a resolute Willam 4.0 mm x 38 mm balloon.  We did experience a no reflow phenomenon.  We separately done.  A twin pass catheter and then did local drug delivery with 200 mcg of nitroprusside and 200 mcgnitroglycerin.  Provided restoration of SCARLETT-3 flow. Findings:  1. Left main coronary artery was normal. It gave off the left anterior descending artery and left circumflex. 2. Left anterior descending artery has 60% severe atherosclerotic disease.  It was moderate in size. It gave off septal perforators and a moderate sized diagonal branch. The LAD covered the entire apex of the left ventricle. 3. Left circumflex has 60% severe atherosclerotic disease.  It was moderate in size. There was a moderate sized obtuse marginal branch. 4. Right coronary artery has 99% severe atherosclerotic disease. It was moderate in size and was the dominant artery.   CONCLUSIONS:  1.  Apercutaneous coronary intervention of the right coronary artery utilizing a single resolute Willam drug-eluting stent  ASSESSMENT/RECOMMENDATIONS:  1.  Dual antiplatelet therapy  2.  Medical management of the remaining coronary disease     Echo 1/2022:   Limited only f/u for LVEF and RVF.   The left ventricular systolic function is mildly reduced with an ejection fraction of 40-45 %.   There is hypokinesis of the apex and inferior walls.   There is a very small circumferential pericardial effusion noted.   No tamponade physiology.   The right ventricle is normal in size and function.     Echo 7/9/2020:  Low Normal systolic function with an estimated ejection fraction of 50%.   Left ventricular function and wall motion is difficult to estimate due to poor endocardial visualization.   Grade I diastolic dysfunction with normal filing pressure.   Normally functioning TAVR 29 mm Langford Leyda S3 bioprosthetic valve in   aortic position appears well seated with a max velocity of 2.11 m/sec,   maximum gradient of 18 mmHg and a mean gradient of 8 mmHg.   There is no evidence of aortic regurgitation  Coronary angiogram 8/22/2019:  LM <20%  LAD patent stent  Cx <20%  RCA 30%  Severe AS by echo  Proceed w/ TAVR  Echo 1/24/2017:  Summary   Left ventricular systolic function is normal with an ejection fraction of   55-60%.   No wall motion abnormalities noted.   Mild concentric left ventricular hypertrophy.   Grade II diastolic dysfunction with elevated filling pressure.   Mildly dilated left atrium.   Moderate aortic stenosis.   Mild aortic regurgitation.   Compared to previous study from 10/27/2016, aortic stenosis has not   progressed.        R/LHC 1/23/2017:  LM <20%  LAD 30% w/ patent stent  Cx 20-30%  RCA 20-30%  LVEDP 19  RA 8, RV 41/10, PA 45/14/31, W 15      Nonobstructive CAD  Mild elevated right heart pressures      Echo Oct 2016:   Normal left ventricle size with mild concentric left ventricular hypertrophy.   Normal systolic function with an estimated ejection fraction of 55%.   No regional wall motion abnormalities are seen.   Elevated left ventricular diastolic filling pressure ( E/e' 21 ).  The aortic valve is thickened/calcified with decreased leaflet mobility. Cannot exclude a bicuspid valve.  The aortic valve area is calculated at 1.0 cm2 with a max velocity of 3.36 m/sec, maximum pressure gradient of 45 mmHg and a mean pressure gradient of 23 mmHg. This is c/w moderate aortic stenosis. Color flow demonstrates eccentric jet suggesting mild aortic regurgitation.   Trace mitral and tricuspid regurgitation. Systolic pulmonary artery pressure (SPAP) is normal and estimated at 22 mmHg (RA pressure 3 mmHg).   There is mild concentric left ventricular hypertrophy. MPI 6/18/15:   Brandee Cheeks is a very small and mild fixed posterior-basal defect consistent with  fibrosis. There is no evidence for ischemia.  Left ventricular function is reduced with and estimated LVEF of 40%.  The LV is severely dilated.    CATH: 5/26/15, Dr. Eagle Aus  LM <20%   LAD 70% mid.  FFR 0.77  D1 50% prox  Cx 20%  RCA 20%  LVEDP 22mmHg  RA 9, RV 42/10, PA 44/16/31, W 18  PA sat 63%     PTCA LAD  3.0 x 15 PATRICIA  prox portion post 3.5 x 8 NC balloon     DAPT, statin  Resume lasix and ARB at discharge provided Cr stable  Renal fxn will not tolerate higher dose of lasix than 40 daily  Needs smoking cessation, weight loss, exercise  Rec OP sleep eval        Echo 3/26/15:   Left ventricle size is normal.  Mild c LVH. EF 55 % to 60 %. No regional wall motion abnormalities are noted. Normal diastolic filling pattern for age. Mild mitral regurgitation is present. Mildly dilated left atrium by increased left atrial volume. Aortic valve is mildly thickened and calcified. The aortic valve area is calculated at 1.4 cm2 with a maximum gradient of 32 mmHg and a mean gradient of 18 mmHg. This is c/w mild aortic stenosis. Trace aortic regurgitation. Trivial tricuspid regurgitation. Systolic pulmonary artery pressure (SPAP) is normal and estimated at 14 mmHg (RA pressure 3 mm Hg). Studies:     Ct chest    Impression   Negative for acute pulmonary embolus.       Bilateral patchy areas of atelectasis or infiltrate in the lungs most   pronounced in the left lower lobe.       Bilateral pleural effusions (left greater than right).       Mediastinal adenopathy possibly reactive in nature.  Recommend follow-up CT   chest in 1-3 months to confirm resolution unless clinically indicated sooner.             I have reviewed labs and imaging/xray/diagnostic testing in this note.     Assessment and Plan          Patient Active Problem List   Diagnosis    Sepsis without acute organ dysfunction ([address]    CHF (congestive heart failure) ([facility])    Asthma-COPD overlap syndrome ([address]    Coronary artery disease involving native coronary artery of native heart without angina pectoris    PVD (peripheral vascular disease) ([address]    Bicuspid aortic valve    Bilateral hilar adenopathy syndrome    Claudication in peripheral vascular disease ([address]    HTN (hypertension), benign    Diabetic neuropathy (HCC)    Type 2 diabetes, uncontrolled, with neuropathy ([address]  Passive smoke exposure    Depression with anxiety    Pneumonia of right upper lobe due to infectious organism    Obesity (BMI 30-39. 9)    ZAINAB on CPAP    Degeneration of lumbar or lumbosacral intervertebral disc    Lumbar radiculopathy    Lumbosacral spondylosis without myelopathy    Biliary colic    Symptomatic cholelithiasis    Gastroparesis due to DM ([facility])    Elevated troponin    Angina, class IV ([facility])    Dyspnea    Dyslipidemia    Acute on chronic combined systolic and diastolic heart failure ([facility])    Ischemic cardiomyopathy    Tobacco abuse    [facility]    History of CVA (cerebrovascular accident)    Type 2 diabetes mellitus without complication, without long-term current use of insulin ([facility])    ZAINAB (obstructive sleep apnea)    Pleural effusion    Chronic anemia    Nonrheumatic aortic valve stenosis    Mucus plugging of bronchi    Hemodialysis-associated hypotension    ESRD on dialysis ([facility])    Hypotension due to drugs    Acute diastolic CHF (congestive heart failure) ([facility])    Neuromuscular disorder ([facility])    Renovascular hypertension    Mixed hyperlipidemia    Cigarette nicotine dependence in remission    Pulmonary edema    Fluid overload    Anemia of chronic disease    SOB (shortness of breath) on exertion    Steal syndrome of dialysis vascular access ([facility])    Chronic, continuous use of opioids    Chronic bronchitis ([facility])    Nasal congestion    Hypercholesteremia    Bradycardia    History of transcatheter aortic valve replacement (TAVR)    Syncope and collapse    PAF (paroxysmal atrial fibrillation) ([facility])    Bilateral leg weakness    GBS (Guillain-Union syndrome) ([facility])    Sinus pause    Weakness of both lower extremities    Sinus bradycardia    Ataxia    Peripheral vascular occlusive disease ([facility])    Cellulitis of right foot    Iliac artery occlusion, right ([facility])    Cellulitis and abscess of hand    Type 2 diabetes mellitus with hyperglycemia ([facility]    Acute encephalopathy    Acute hypoxemic respiratory failure (HCC)    Acute CVA (cerebrovascular accident) ([facility]    Speech problem    Urinary tract infection with hematuria    Respiratory failure with hypoxia (HCC)    Acute respiratory failure with hypercapnia (HCC)    Acute pulmonary edema (HCC)    Grade II diastolic dysfunction    Shock circulatory (HCC)    Smoker    Normocytic normochromic anemia    NSTEMI (non-ST elevated myocardial infarction) (HCC)    SIRS (systemic inflammatory response syndrome) (HCC)    Atrial flutter (HCC)    [place]-rectal abscess    Somnolence    Pulmonary edema with diastolic CHF, NYHA class 3 (HCC)    Respiratory failure ([address]    Former smoker    Cardiomyopathy ([facility]    Morbid obesity with BMI of 40.0-44.9, adult (HCC)    Hypoglycemia       Elevated troponin, likely supply/demand mismatch, would get f/u echo and lexiscan nuc stress test status post PCI January 2020 agree plan on medical management unless there are very high risk features. CAD/ASHD, continue dual antiplatelet therapy    Paroxysmal atrial fibrillation, continue Eliquis    Mixed cardiomyopathy, ischemic/nonischemic, continue metoprolol, long-acting nitrates, start entresto     Pneumonia, chronic respiratory failure, dm, esrd as per primary service    Thank you for allowing us to participate in the care of Niko Noe. Please call me with any questions [phone].     Wang Paulson MD, [facility]   Interventional Cardiologist  [facility]  [phone] [facility]  [phone] [address] [place] [address]  5/31/2022 8:57 AM

## 2022-05-31 NOTE — PROGRESS NOTES
05/30/22 2135   NIV Type   Skin Assessment Clean, dry, & intact   Skin Protection for O2 Device No  (pt does not want mepilex)   NIV Started/Stopped On   Equipment Type v60   Mode AVAPS   Mask Type Full face mask   Mask Size Large   Settings/Measurements   CPAP/EPAP 6 cmH2O   IPAP Min 18 cmH2O   IPAP Max 25 cmH2O   Vt (Set, mL) 700 mL   Rate Ordered 15   Resp 16   FiO2  30 %   I Time/ I Time % 1 s   Vt Exhaled 716 mL   Minute Volume 11 Liters   Mask Leak (lpm) 27 lpm   Comfort Level Good   Using Accessory Muscles No   Alarm Settings   Alarms On Y   Low Pressure 6 cmH2O   High Pressure  30 cmH2O   Apnea (secs) 20 secs   RR Low  6   RR High 40 br/min

## 2022-05-31 NOTE — PROGRESS NOTES
[facility] Progress Note           Subjective/   45y.o. year old male who we are seeing in consultation for ESKD on HD. HPI:  HD MWF    ROS:  SOB helped by breathing treatments    Objective/   GEN:  Chronically ill, BP (!) 157/97   Pulse 66   Temp 98 °F (36.7 °C) (Oral)   Resp 20   Wt 240 lb 4.8 oz (109 kg)   SpO2 98%   BMI 38.79 kg/m²   HEENT: non-icteric, no JVD  CV: S1, S2 without m/r/g; no LE edema  RESP: CTA B without w/r/r; breathing wnl  ABD: +bs, soft, nt, no hsm  SKIN: warm, no rashes  ACCESS: Left IJ [facility]    Data/  Recent Labs     05/29/22  0944 05/30/22  0450 05/31/22  0449   WBC 13.3* 9.4 9.4   HGB 9.8* 8.5* 8.7*   HCT 29.9* 25.7* 26.2*   MCV 90.0 90.5 90.5    276 298     Recent Labs     05/29/22  0944 05/29/22  0944 05/29/22  1017 05/30/22  0450 05/31/22  0449   *  --   --  128* 132*   K 4.5  --   --  5.1 4.3   CL 87*  --   --  87* 96*   CO2 24  --   --  17* 20*   GLUCOSE 59*   < > 51 138* 65*   BUN 63*  --   --  69* 39*   CREATININE 8.0*  --   --  8.4* 5.7*   LABGLOM 7*  --   --  7* 10*   GFRAA 9*  --   --  8* 13*    < > = values in this interval not displayed. Assessment/     - End stage kidney disease - on HD Mon-Wed-Fri     - Acute on chronic respiratory failure r/o sepsis - started on antibiotics     - sCHF - EF ~40%, decompensated     - Hypertension - poorly controlled     - Anemia of chronic disease - Epogen 7,400 units qHD as outpatient       Plan/     - HD per schedule with UF as tolerated, EDW has been reduced to 109.5 kg, leaving under  - DAVON with HD - increased dose to 10K qHD  - Trend labs, bp's, cultures     ____________________________________  Joy Agarwal MD  [facility]  [email or web address]  Office: [phone]

## 2022-05-31 NOTE — PROGRESS NOTES
Hospitalist Progress Note      PCP: Tomeka Solis MD    Date of Admission: 5/29/2022    Chief Complaint: confusion at home       Subjective:  Still having some chest pain, still short of breath. Compliant with overnight BiPAP. Medications:  Reviewed    Infusion Medications    dextrose      sodium chloride       Scheduled Medications    epoetin siddharth-epbx  10,000 Units IntraVENous Q MWF    insulin lispro  0-3 Units SubCUTAneous Nightly    insulin lispro  0-6 Units SubCUTAneous TID WC    mupirocin   Topical Daily    QUEtiapine  25 mg Oral Nightly    apixaban  5 mg Oral BID    b complex-C-folic acid  1 mg Oral Daily    calcium acetate  1 capsule Oral TID WC    clopidogrel  75 mg Oral Daily    docusate sodium  100 mg Oral Daily    DULoxetine  30 mg Oral Daily    isosorbide dinitrate  10 mg Oral TID    tiotropium-olodaterol  2 puff Inhalation Daily    sennosides-docusate sodium  1 tablet Oral Daily    pravastatin  40 mg Oral Nightly    pantoprazole  40 mg Oral QAM AC    metoprolol succinate  50 mg Oral BID    sodium chloride flush  5-40 mL IntraVENous 2 times per day    piperacillin-tazobactam  3,375 mg IntraVENous Q12H    vancomycin (VANCOCIN) intermittent dosing (placeholder)   Other RX Placeholder     PRN Meds: glucose, dextrose bolus **OR** dextrose bolus, glucagon (rDNA), dextrose, albuterol sulfate HFA, calcium carbonate, cyclobenzaprine, ipratropium-albuterol, nitroGLYCERIN, mineral oil, oxyCODONE-acetaminophen, sodium chloride flush, sodium chloride, ondansetron **OR** ondansetron, polyethylene glycol, acetaminophen **OR** acetaminophen, hydrOXYzine, traZODone    No intake or output data in the 24 hours ending 05/31/22 0805    Physical Exam Performed:    /69   Pulse 65   Temp 97.9 °F (36.6 °C) (Axillary)   Resp 21   Wt 240 lb 4.8 oz (109 kg)   SpO2 100%   BMI 38.79 kg/m²       General appearance: No apparent distress, appears stated age and cooperative.   HEENT: Pupils equal, round, and reactive to light. Conjunctivae/corneas clear. Neck: Supple, with full range of motion. No jugular venous distention. Trachea midline. Respiratory:  Normal respiratory effort. Clear to auscultation, bilaterally without Rales/Wheezes/Rhonchi. Diminished throughout. Cardiovascular: Regular rate and rhythm with normal S1/S2 without murmurs, rubs or gallops. Abdomen: Soft, non-tender, non-distended with normal bowel sounds. Musculoskeletal: No clubbing, cyanosis or edema bilaterally. Full range of motion without deformity. Skin: Skin color, texture, turgor normal.  No rashes or lesions. Neurologic:  Neurovascularly intact without any focal sensory/motor deficits. Cranial nerves: II-XII intact, grossly non-focal.  Psychiatric: Alert and oriented, thought content appropriate, normal insight. Anxious affect. Capillary Refill: Brisk,3 seconds, normal   Peripheral Pulses: +2 palpable, equal bilaterally       Labs:   Recent Labs     05/29/22  0944 05/30/22  0450 05/31/22  0449   WBC 13.3* 9.4 9.4   HGB 9.8* 8.5* 8.7*   HCT 29.9* 25.7* 26.2*    276 298     Recent Labs     05/29/22  0944 05/30/22  0450 05/31/22  0449   * 128* 132*   K 4.5 5.1 4.3   CL 87* 87* 96*   CO2 24 17* 20*   BUN 63* 69* 39*   CREATININE 8.0* 8.4* 5.7*   CALCIUM 8.5 8.2* 8.4     Recent Labs     05/29/22  0944   AST 13*   ALT 10   BILITOT 0.3   ALKPHOS 100     Recent Labs     05/29/22  1155   INR 1.21*     Recent Labs     05/29/22  1823 05/29/22  2358 05/30/22  0450   TROPONINI 0.10* 0.09* 0.09*       Urinalysis:      Lab Results   Component Value Date    NITRU Negative 05/29/2022    WBCUA 10-20 05/29/2022    BACTERIA 3+ 05/29/2022    RBCUA 5-10 05/29/2022    BLOODU TRACE-INTACT 05/29/2022    SPECGRAV 1.015 05/29/2022    GLUCOSEU 100 05/29/2022       Radiology:  CT HEAD WO CONTRAST   Final Result   No acute intracranial abnormality.          CT CHEST PULMONARY EMBOLISM W CONTRAST   Final Result   Negative for acute pulmonary embolus. Bilateral patchy areas of atelectasis or infiltrate in the lungs most   pronounced in the left lower lobe. Bilateral pleural effusions (left greater than right). Mediastinal adenopathy possibly reactive in nature. Recommend follow-up CT   chest in 1-3 months to confirm resolution unless clinically indicated sooner. XR CHEST PORTABLE   Final Result   1. Central predominant edema potentially of cardiac or noncardiogenic origin. Pneumonia could appear similar. 2. Increased left basilar airspace opacity due in part to passive atelectasis   given at least trace to small left pleural effusion. Underlying rounded   atelectasis is likely present given the prior CT appearance, and superimposed   pneumonia and aspiration are not excluded. 3. At least trace right pleural effusion. Assessment/Plan:    Active Hospital Problems    Diagnosis     Hypoglycemia [E16.2]      Priority: Medium    History of transcatheter aortic valve replacement (TAVR) [Z95.2]      Priority: Medium    Elevated troponin [R77.8]        \"46 y.o. male  with multiple medical issueswho presented to [facility] with confusion/sob/chest pain. Pt states he has chronic sob and chest pain(has element of anxiety) and yesterday he developed inc'd sob that was fairly sudden onset and continued into the night. He also noted ongoing substernal chest pain, nonradiating that would last a few hours(would subside on its own with deep breathing). \"      HCAP, with sepsis present on arrival.  No guiding culture data. Empiric vanc and zosyn. Planning PO abx upon discharge. ESRD on HD. Nephrology removing fluid, lowered target weight. Chronic hypoxic respiratory failure. Baseline 3-4LNC. Chest pain, with known h/o CAD. No further workup per cardiology. Continue clopidogrel, statin, metoprolol, ISDN. DM2.   Complicated by hypoglycemia on admission, with acute metabolic encephalopathy. Reduced insulin regimen. F/u with PCP. PAF. Apixaban, metoprolol. DVT Prophylaxis: anticoagulation as above  Diet: ADULT DIET; Regular; Low Sodium (2 gm); Low Phosphorus (Less than 1000 mg)  Code Status: Full Code    PT/OT Eval Status: not indicated    Dispo - home when he is breathing more easily. Perhaps 6/1.         Esteban Cobb MD

## 2022-05-31 NOTE — PROGRESS NOTES
05/31/22 0342   NIV Type   Equipment Type v60   Mode AVAPS   Mask Type Full face mask   Mask Size Large   Settings/Measurements   CPAP/EPAP 6 cmH2O   IPAP Min 18 cmH2O   IPAP Max 25 cmH2O   Vt (Set, mL) 700 mL   Rate Ordered 15   Resp 21   FiO2  30 %   I Time/ I Time % 1 s   Vt Exhaled 789 mL   Minute Volume 16 Liters   Mask Leak (lpm) 5 lpm   Comfort Level Good   Using Accessory Muscles No   SpO2 99   Alarm Settings   Alarms On Y   Low Pressure 6 cmH2O   High Pressure  30 cmH2O   Apnea (secs) 20 secs   RR Low  6   RR High 40 br/min

## 2022-05-31 NOTE — PROGRESS NOTES
05/31/22 0008   NIV Type   $NIV $Daily Charge   Equipment Type v60   Mode AVAPS   Mask Type Full face mask   Mask Size Large   Settings/Measurements   CPAP/EPAP 6 cmH2O   IPAP Min 18 cmH2O   IPAP Max 25 cmH2O   Vt (Set, mL) 700 mL   Rate Ordered 15   Resp 16   FiO2  30 %   I Time/ I Time % 1 s   Vt Exhaled 672 mL   Minute Volume 10.7 Liters   Mask Leak (lpm) 5 lpm   Comfort Level Good   Using Accessory Muscles No   SpO2 97   Alarm Settings   Alarms On Y   Low Pressure 6 cmH2O   High Pressure  30 cmH2O   Apnea (secs) 20 secs   RR Low  6   RR High 40 br/min

## 2022-06-01 ENCOUNTER — APPOINTMENT (OUTPATIENT)
Dept: NUCLEAR MEDICINE | Age: 52
DRG: 871 | End: 2022-06-01
Payer: COMMERCIAL

## 2022-06-01 LAB
ANION GAP SERPL CALCULATED.3IONS-SCNC: 19 MMOL/L (ref 3–16)
BUN BLDV-MCNC: 50 MG/DL (ref 7–20)
CALCIUM SERPL-MCNC: 7.8 MG/DL (ref 8.3–10.6)
CHLORIDE BLD-SCNC: 91 MMOL/L (ref 99–110)
CO2: 19 MMOL/L (ref 21–32)
CREAT SERPL-MCNC: 6.9 MG/DL (ref 0.9–1.3)
GFR AFRICAN AMERICAN: 10
GFR NON-AFRICAN AMERICAN: 8
GLUCOSE BLD-MCNC: 145 MG/DL (ref 70–99)
GLUCOSE BLD-MCNC: 72 MG/DL (ref 70–99)
GLUCOSE BLD-MCNC: 73 MG/DL (ref 70–99)
GLUCOSE BLD-MCNC: 81 MG/DL (ref 70–99)
HCT VFR BLD CALC: 26.6 % (ref 40.5–52.5)
HEMOGLOBIN: 8.6 G/DL (ref 13.5–17.5)
MCH RBC QN AUTO: 30 PG (ref 26–34)
MCHC RBC AUTO-ENTMCNC: 32.2 G/DL (ref 31–36)
MCV RBC AUTO: 92.9 FL (ref 80–100)
PDW BLD-RTO: 16.3 % (ref 12.4–15.4)
PERFORMED ON: ABNORMAL
PERFORMED ON: NORMAL
PERFORMED ON: NORMAL
PLATELET # BLD: 286 K/UL (ref 135–450)
PMV BLD AUTO: 7.7 FL (ref 5–10.5)
POTASSIUM REFLEX MAGNESIUM: 4.8 MMOL/L (ref 3.5–5.1)
RBC # BLD: 2.87 M/UL (ref 4.2–5.9)
SODIUM BLD-SCNC: 129 MMOL/L (ref 136–145)
VANCOMYCIN RANDOM: 15.6 UG/ML
WBC # BLD: 9.6 K/UL (ref 4–11)

## 2022-06-01 PROCEDURE — 2580000003 HC RX 258: Performed by: INTERNAL MEDICINE

## 2022-06-01 PROCEDURE — 2500000003 HC RX 250 WO HCPCS: Performed by: INTERNAL MEDICINE

## 2022-06-01 PROCEDURE — 6370000000 HC RX 637 (ALT 250 FOR IP): Performed by: INTERNAL MEDICINE

## 2022-06-01 PROCEDURE — 2700000000 HC OXYGEN THERAPY PER DAY

## 2022-06-01 PROCEDURE — 80048 BASIC METABOLIC PNL TOTAL CA: CPT

## 2022-06-01 PROCEDURE — 80202 ASSAY OF VANCOMYCIN: CPT

## 2022-06-01 PROCEDURE — 90935 HEMODIALYSIS ONE EVALUATION: CPT

## 2022-06-01 PROCEDURE — 94761 N-INVAS EAR/PLS OXIMETRY MLT: CPT

## 2022-06-01 PROCEDURE — 99233 SBSQ HOSP IP/OBS HIGH 50: CPT | Performed by: NURSE PRACTITIONER

## 2022-06-01 PROCEDURE — 94660 CPAP INITIATION&MGMT: CPT

## 2022-06-01 PROCEDURE — 36415 COLL VENOUS BLD VENIPUNCTURE: CPT

## 2022-06-01 PROCEDURE — A9502 TC99M TETROFOSMIN: HCPCS | Performed by: INTERNAL MEDICINE

## 2022-06-01 PROCEDURE — 3430000000 HC RX DIAGNOSTIC RADIOPHARMACEUTICAL: Performed by: INTERNAL MEDICINE

## 2022-06-01 PROCEDURE — 6360000002 HC RX W HCPCS

## 2022-06-01 PROCEDURE — 85027 COMPLETE CBC AUTOMATED: CPT

## 2022-06-01 PROCEDURE — 1200000000 HC SEMI PRIVATE

## 2022-06-01 PROCEDURE — 6360000002 HC RX W HCPCS: Performed by: INTERNAL MEDICINE

## 2022-06-01 RX ORDER — DOXYCYCLINE HYCLATE 100 MG
100 TABLET ORAL 2 TIMES DAILY
Qty: 6 TABLET | Refills: 0 | Status: SHIPPED | OUTPATIENT
Start: 2022-06-01 | End: 2022-06-08 | Stop reason: HOSPADM

## 2022-06-01 RX ORDER — HEPARIN SODIUM 1000 [USP'U]/ML
INJECTION, SOLUTION INTRAVENOUS; SUBCUTANEOUS
Status: DISPENSED
Start: 2022-06-01 | End: 2022-06-01

## 2022-06-01 RX ORDER — DOXYCYCLINE HYCLATE 100 MG
100 TABLET ORAL EVERY 12 HOURS SCHEDULED
Status: DISCONTINUED | OUTPATIENT
Start: 2022-06-01 | End: 2022-06-02

## 2022-06-01 RX ADMIN — SACUBITRIL AND VALSARTAN 1 TABLET: 24; 26 TABLET, FILM COATED ORAL at 20:20

## 2022-06-01 RX ADMIN — NEPHROCAP 1 MG: 1 CAP ORAL at 15:40

## 2022-06-01 RX ADMIN — ISOSORBIDE DINITRATE 10 MG: 10 TABLET ORAL at 19:54

## 2022-06-01 RX ADMIN — DULOXETINE HYDROCHLORIDE 30 MG: 30 CAPSULE, DELAYED RELEASE ORAL at 15:40

## 2022-06-01 RX ADMIN — ISOSORBIDE DINITRATE 10 MG: 10 TABLET ORAL at 15:43

## 2022-06-01 RX ADMIN — EPOETIN ALFA-EPBX 10000 UNITS: 10000 INJECTION, SOLUTION INTRAVENOUS; SUBCUTANEOUS at 10:46

## 2022-06-01 RX ADMIN — DOXYCYCLINE HYCLATE 100 MG: 100 TABLET, COATED ORAL at 17:54

## 2022-06-01 RX ADMIN — OXYCODONE AND ACETAMINOPHEN 1 TABLET: 7.5; 325 TABLET ORAL at 19:54

## 2022-06-01 RX ADMIN — APIXABAN 5 MG: 5 TABLET, FILM COATED ORAL at 20:20

## 2022-06-01 RX ADMIN — CALCIUM ACETATE 667 MG: 667 CAPSULE ORAL at 17:54

## 2022-06-01 RX ADMIN — QUETIAPINE FUMARATE 25 MG: 25 TABLET ORAL at 19:54

## 2022-06-01 RX ADMIN — PIPERACILLIN AND TAZOBACTAM 3375 MG: 3; .375 INJECTION, POWDER, LYOPHILIZED, FOR SOLUTION INTRAVENOUS at 03:38

## 2022-06-01 RX ADMIN — TETROFOSMIN 30.7 MILLICURIE: 1.38 INJECTION, POWDER, LYOPHILIZED, FOR SOLUTION INTRAVENOUS at 14:15

## 2022-06-01 RX ADMIN — VANCOMYCIN HYDROCHLORIDE 500 MG: 500 INJECTION, POWDER, LYOPHILIZED, FOR SOLUTION INTRAVENOUS at 10:45

## 2022-06-01 RX ADMIN — METOPROLOL SUCCINATE 50 MG: 50 TABLET, EXTENDED RELEASE ORAL at 19:54

## 2022-06-01 RX ADMIN — PRAVASTATIN SODIUM 40 MG: 40 TABLET ORAL at 19:54

## 2022-06-01 RX ADMIN — CLOPIDOGREL BISULFATE 75 MG: 75 TABLET ORAL at 15:40

## 2022-06-01 ASSESSMENT — PAIN DESCRIPTION - LOCATION: LOCATION: BACK

## 2022-06-01 ASSESSMENT — PAIN DESCRIPTION - DESCRIPTORS: DESCRIPTORS: STABBING

## 2022-06-01 ASSESSMENT — PAIN SCALES - GENERAL: PAINLEVEL_OUTOF10: 9

## 2022-06-01 ASSESSMENT — PAIN DESCRIPTION - ONSET: ONSET: ON-GOING

## 2022-06-01 ASSESSMENT — PAIN DESCRIPTION - PAIN TYPE: TYPE: CHRONIC PAIN

## 2022-06-01 ASSESSMENT — PAIN - FUNCTIONAL ASSESSMENT: PAIN_FUNCTIONAL_ASSESSMENT: PREVENTS OR INTERFERES SOME ACTIVE ACTIVITIES AND ADLS

## 2022-06-01 ASSESSMENT — PAIN DESCRIPTION - FREQUENCY: FREQUENCY: INTERMITTENT

## 2022-06-01 ASSESSMENT — PAIN DESCRIPTION - ORIENTATION: ORIENTATION: RIGHT;LEFT;LOWER

## 2022-06-01 NOTE — PROGRESS NOTES
Pt A/Ox4, VSS, afebrile. sp02 WNL on 2L NC. Pt c/o back pain. Prn percocet given. IV antibiotics given. Pt very difficult IV stick. PIV placed under US guidance after 3 attempts. Will cont to monitor.

## 2022-06-01 NOTE — DISCHARGE SUMMARY
Hospital Medicine Discharge Summary    Patient ID: Niko Noe      Patient's PCP: Cat Montgomery MD    Admit Date: 5/29/2022     Discharge Date:   06/01/22     Admitting Provider: Terrance Alford MD     Discharge Provider: Julien Menard MD     Discharge Diagnoses: Active Hospital Problems    Diagnosis     Hypoglycemia [E16.2]      Priority: Medium    History of transcatheter aortic valve replacement (TAVR) [Z95.2]      Priority: Medium    Elevated troponin [R77.8]        The patient was seen and examined on day of discharge and this discharge summary is in conjunction with any daily progress note from day of discharge. Hospital Course:  \"46 y. o. male  with multiple medical issueswho [facility] with confusion/sob/chest pain.  Pt states he has chronic sob and chest pain(has element of anxiety) and yesterday he developed inc'd sob that was fairly sudden onset and continued into the night. He also noted ongoing substernal chest pain, nonradiating that would last a few hours(would subside on its own with deep breathing). \"        HCAP, with sepsis present on arrival.  No guiding culture data. Empiric vanc and zosyn here. Will discharge with 3 more days of doxycycline PO.     ESRD on HD. Nephrology removing fluid, lowered target weight. He felt better.     Chronic hypoxic respiratory failure. Baseline 3-4LNC.     Chest pain, with known h/o CAD. Appreciate cardiology input, TTE and stress test are pending at the time of this note. Continue clopidogrel, statin, metoprolol, ISDN.     DM2. Complicated by hypoglycemia on admission, with acute metabolic encephalopathy. Stopped insulin and his sugars were fine. F/u with PCP.    HTN.  ISDN, metoprolol. Cardiology started entresto.     PAF. Apixaban, metoprolol.           Physical Exam Performed:     /66   Pulse 61   Temp 97.3 °F (36.3 °C) (Axillary)   Resp 20   Wt 240 lb 4.8 oz (109 kg)   SpO2 100%   BMI 38.79 kg/m²       General appearance: No apparent distress, appears stated age and cooperative. HEENT: Pupils equal, round, and reactive to light. Conjunctivae/corneas clear. Neck: Supple, with full range of motion. No jugular venous distention. Trachea midline. Respiratory:  Normal respiratory effort. Clear to auscultation, bilaterally without Rales/Wheezes/Rhonchi. Diminished throughout. Cardiovascular: Regular rate and rhythm with normal S1/S2 without murmurs, rubs or gallops. Abdomen: Soft, non-tender, non-distended with normal bowel sounds. Musculoskeletal: No clubbing, cyanosis or edema bilaterally. Full range of motion without deformity. Skin: Skin color, texture, turgor normal.  No rashes or lesions. Neurologic:  Neurovascularly intact without any focal sensory/motor deficits. Cranial nerves: II-XII intact, grossly non-focal.  Psychiatric: Alert and oriented, thought content appropriate, normal insight. Anxious affect. Capillary Refill: Brisk,3 seconds, normal   Peripheral Pulses: +2 palpable, equal bilaterally       Labs: For convenience and continuity at follow-up the following most recent labs are provided:      CBC:    Lab Results   Component Value Date    WBC 9.6 06/01/2022    HGB 8.6 06/01/2022    HCT 26.6 06/01/2022     06/01/2022       Renal:    Lab Results   Component Value Date     05/31/2022    K 4.3 05/31/2022    CL 96 05/31/2022    CO2 20 05/31/2022    BUN 39 05/31/2022    CREATININE 5.7 05/31/2022    CALCIUM 8.4 05/31/2022    PHOS 6.4 04/27/2022         Significant Diagnostic Studies    Radiology:   CT HEAD WO CONTRAST   Final Result   No acute intracranial abnormality. CT CHEST PULMONARY EMBOLISM W CONTRAST   Final Result   Negative for acute pulmonary embolus. Bilateral patchy areas of atelectasis or infiltrate in the lungs most   pronounced in the left lower lobe. Bilateral pleural effusions (left greater than right).       Mediastinal adenopathy possibly reactive in nature. Recommend follow-up CT   chest in 1-3 months to confirm resolution unless clinically indicated sooner. XR CHEST PORTABLE   Final Result   1. Central predominant edema potentially of cardiac or noncardiogenic origin. Pneumonia could appear similar. 2. Increased left basilar airspace opacity due in part to passive atelectasis   given at least trace to small left pleural effusion. Underlying rounded   atelectasis is likely present given the prior CT appearance, and superimposed   pneumonia and aspiration are not excluded. 3. At least trace right pleural effusion. NM Cardiac Stress Test Nuclear Imaging    (Results Pending)          Consults:     IP CONSULT TO NEPHROLOGY  IP CONSULT TO CARDIOLOGY  IP CONSULT TO PHARMACY    Disposition:  home     Condition at Discharge: Stable    Discharge Instructions/Follow-up:  Follow up with PCP within 1-2 weeks. Code Status:  Full Code     Activity: activity as tolerated    Diet: renal diet      Discharge Medications:     Current Discharge Medication List           Details   sacubitril-valsartan (ENTRESTO) 24-26 MG per tablet Take 1 tablet by mouth 2 times daily  Qty: 60 tablet, Refills: 0      doxycycline hyclate (VIBRA-TABS) 100 MG tablet Take 1 tablet by mouth 2 times daily for 3 days  Qty: 6 tablet, Refills: 0              Details   clopidogrel (PLAVIX) 75 MG tablet Take 1 tablet by mouth daily  Qty: 90 tablet, Refills: 3      oxyCODONE-acetaminophen (PERCOCET) 7.5-325 MG per tablet Take 1 tablet by mouth every 6 hours as needed (pain) for up to 30 days.   Qty: 120 tablet, Refills: 0    Comments: Reduce doses taken as pain becomes manageable  Associated Diagnoses: Chronic pain syndrome      cyclobenzaprine (FLEXERIL) 10 MG tablet TAKE 1 TABLET BY MOUTH EVERY 8 HOURS AS NEEDED  Qty: 90 tablet, Refills: 10    Associated Diagnoses: Chronic pain syndrome      pantoprazole (PROTONIX) 40 MG tablet TAKE 1 TABLET BY MOUTH EVERY MORNING BEFORE BREAKFAST  Qty: 30 tablet, Refills: 10    Associated Diagnoses: Gastroesophageal reflux disease without esophagitis      !! QUEtiapine (SEROQUEL) 50 MG tablet TAKE 1 TABLET BY MOUTH IN THE EVENING  Qty: 30 tablet, Refills: 10    Associated Diagnoses: Insomnia, unspecified type; Mood disorder (HCC)      isosorbide dinitrate (ISORDIL) 10 MG tablet Take 1 tablet by mouth 3 times daily  Qty: 90 tablet, Refills: 3      !!  QUEtiapine (SEROQUEL) 25 MG tablet Take 1 tablet by mouth nightly  Qty: 60 tablet, Refills: 3      docusate sodium (DOK) 100 MG capsule Take 1 capsule by mouth daily  Qty: 30 capsule, Refills: 5      LINZESS 145 MCG capsule TAKE 1 CAPSULE BY MOUTH IN THE MORNING BEFORE BREAKFAST  Qty: 30 capsule, Refills: 10    Associated Diagnoses: Chronic idiopathic constipation      fluticasone (FLONASE) 50 MCG/ACT nasal spray SHAKE LIQUID AND USE 2 SPRAYS IN EACH NOSTRIL DAILY  Qty: 48 g, Refills: 0    Associated Diagnoses: Nasal congestion      DULoxetine (CYMBALTA) 30 MG extended release capsule TAKE 1 CAPSULE BY MOUTH EVERY DAY  Qty: 30 capsule, Refills: 10    Associated Diagnoses: Depression, unspecified depression type      mineral oil liquid Take 30 mLs by mouth daily as needed for Constipation  Qty: 300 mL, Refills: 0    Associated Diagnoses: Constipation, unspecified constipation type      sennosides-docusate sodium (SENOKOT-S) 8.6-50 MG tablet Take 1 tablet by mouth daily  Qty: 10 tablet, Refills: 0    Associated Diagnoses: Constipation, unspecified constipation type      metoprolol succinate (TOPROL XL) 50 MG extended release tablet Take 1 tablet by mouth in the morning and at bedtime  Qty: 60 tablet, Refills: 1      apixaban (ELIQUIS) 5 MG TABS tablet Take 1 tablet by mouth 2 times daily  Qty: 60 tablet, Refills: 1      pravastatin (PRAVACHOL) 40 MG tablet Take 1 tablet by mouth daily  Qty: 90 tablet, Refills: 3      Continuous Blood Gluc Sensor (DEXCOM G6 SENSOR) MISC Every 10 days  Qty: 9 each, Refills: 3    Associated Diagnoses: DM (diabetes mellitus), secondary, uncontrolled, w/neurologic complic (HCC)      Continuous Blood Gluc Transmit (DEXCOM G6 TRANSMITTER) MISC 1 each by Does not apply route every 3 months  Qty: 1 each, Refills: 3    Associated Diagnoses: DM (diabetes mellitus), secondary, uncontrolled, w/neurologic complic (HCC)      Continuous Blood Gluc  (DEXCOM G6 ) ADAM 1 each by Does not apply route Daily with lunch  Qty: 1 each, Refills: 0    Associated Diagnoses: DM (diabetes mellitus), secondary, uncontrolled, w/neurologic complic (HCC)      B Complex-C-Folic Acid (VIRT-CAPS) 1 MG CAPS TAKE 1 CAPSULE BY MOUTH EVERY DAY  Qty: 90 capsule, Refills: 1      Calcium Acetate, Phos Binder, 667 MG CAPS TAKE 1 CAPSULE BY MOUTH THREE TIMES DAILY WITH MEALS  Qty: 90 capsule, Refills: 3      nitroGLYCERIN (NITROSTAT) 0.4 MG SL tablet DISSOLVE 1 TABLET UNDER THE TONGUE AS NEEDED FOR CHEST PAIN EVERY 5 MINUTES UP TO 3 TIMES. IF NO RELIEF CALL 911. Qty: 25 tablet, Refills: 10    Associated Diagnoses: Coronary artery disease involving native heart without angina pectoris, unspecified vessel or lesion type      vitamin D (ERGOCALCIFEROL) 34826 units CAPS capsule TK 1 C PO WEEKLY  Refills: 11      Tiotropium Bromide-Olodaterol (STIOLTO RESPIMAT) 2.5-2.5 MCG/ACT AERS Inhale 2 puffs into the lungs daily  Qty: 2 Inhaler, Refills: 0    Comments: 2 samples given:  Lot #[id number], Exp 7/21 & Lot #[id number], Exp 7/21      Blood Glucose Monitoring Suppl ADAM USE AS DIRECTED. Qty: 1 Device, Refills: 0    Comments: WITH CONTROL SOLUTION.       Alcohol Swabs PADS USE AS DIRECTED  Qty: 300 each, Refills: 3      albuterol sulfate  (90 Base) MCG/ACT inhaler Inhale 2 puffs into the lungs every 6 hours as needed for Wheezing  Qty: 1 Inhaler, Refills: 3      ipratropium-albuterol (DUONEB) 0.5-2.5 (3) MG/3ML SOLN nebulizer solution Inhale 3 mLs into the lungs every 6 hours as needed for Shortness of Breath  Qty: 360 mL, Refills: 1      calcium carbonate (TUMS) 500 MG chewable tablet Take 1 tablet by mouth 3 times daily as needed for Heartburn. !! - Potential duplicate medications found. Please discuss with provider. Time Spent on discharge is more than 30 minutes in the examination, evaluation, counseling and review of medications and discharge plan. Signed:    Chas Manzano MD   6/1/2022      Thank you Joe Hyde MD for the opportunity to be involved in this patient's care. If you have any questions or concerns, please feel free to contact me at [phone].

## 2022-06-01 NOTE — PROGRESS NOTES
Pt IV infiltrated while at NM, unable to perform test, pt is very hard stick, writer was unable to successful placed IV, called down to PACU for ultrasound guided assistance but was told nurse can come up to assist an about an hour or more. Pacu nurse tried but unable to place IV, sent message to Dr. Jayy Holguin to make him aware.

## 2022-06-01 NOTE — PROGRESS NOTES
[facility]   Daily Progress Note    Admit Date:  5/29/2022  HPI:    Chief Complaint   Patient presents with    Altered Mental Status     wife called 911 for ams, pt sts\" im having a hard time breathing\" wears 3L of oxygen 24/7      Madhavzachary Felix presented to the hospital with confusion, shortness of breath and chest pain. Carlton Hawley is well-known to me from office and multiple prior admissions. Hx of CAD s/p PCI to LAD in 2015, RCA 1/2022, AS s/p TAVR 2019, ICM, s/d CHF, A. Fib, HYPERTENSION, HLD, DM, PAD s/p PTA R iliac artery, ESRD on HD, chronic anemia, ZAINAB on CPAP, COPD, and hx of CVA. Subjective:  Mr. Tato Espino seen during HD, laying in bed, continues to have shortness of breath but denies any chest pain. He admits to the confusion and is unclear why this occurs. He has been adherent to dialysis sessions but admits may not have been taking all medications as prescribed. He admits he may need additional assistance at home.     Objective:   Patient Vitals for the past 24 hrs:   BP Temp Temp src Pulse Resp SpO2 Weight   06/01/22 1113 (!) 162/90 97.7 °F (36.5 °C) Oral 62 18 100 % --   06/01/22 0747 (!) 132/108 97.9 °F (36.6 °C) -- (!) 129 18 -- 249 lb 1.9 oz (113 kg)   06/01/22 0355 -- -- -- -- 20 -- --   06/01/22 0345 133/66 97.3 °F (36.3 °C) Axillary 61 18 100 % --   06/01/22 0009 -- -- -- -- 24 -- --   05/31/22 2330 128/77 98.1 °F (36.7 °C) Oral 61 18 98 % --   05/31/22 2259 -- -- -- -- 15 -- --   05/31/22 2209 (!) 158/98 97.6 °F (36.4 °C) Oral 64 18 99 % --   05/31/22 2024 -- -- -- -- 22 -- --   05/31/22 1958 (!) 143/66 97.6 °F (36.4 °C) Oral -- -- -- --   05/31/22 1656 135/86 97.8 °F (36.6 °C) Oral 66 16 -- --   05/31/22 1345 (!) 143/82 98.5 °F (36.9 °C) Oral 78 18 93 % --     No intake or output data in the 24 hours ending 06/01/22 1217  Wt Readings from Last 3 Encounters:   06/01/22 249 lb 1.9 oz (113 kg)   05/25/22 245 lb (111.1 kg)   04/27/22 245 lb 13 oz (111.5 kg)         ASSESSMENT: 1. Elevated troponin: Flat and stable from prior measurements during prior admissions, likely complicated by ESRD  2. Chest pain and shortness of breath: Shortness of breath has been persistent and unresolved despite dialysis/ultrafiltration  3. CAD: Reports symptoms of chest pain and dyspnea, elevated troponins, stress test and echocardiogram pending; on beta-blocker, statin, Plavix, nitrate  4. AS s/p TAVR: Await repeat echocardiogram for further assessment  5. HTN: Labile, poorly controlled  6. Cardiomyopathy, mixed ischemic and nonischemic: EF 40-45%, on Toprol, Entresto recommended  7. PAF: Currently in sinus rhythm, anticoagulation with Eliquis  8. ESRD on HD: Per nephrology, now dialyzing to below EDW  9. DM 2  10. Chronic respiratory failure      PLAN:  1. Stress test and echocardiogram as ordered  2. Discussed with patient the addition of Entresto to treat congestive heart failure and cardiomyopathy which may improve his symptoms of dyspnea and chest pain  3. Continue Eliquis for PAF and Plavix for CAD  4. Social work consult for consideration of rehab facility versus home health therapy/assistance  5.  We will follow along    JAY Woods - CNP, 6/1/2022, 12:17 PM  [facility]   [phone]       Telemetry: SR 60-70s  NYHA: III    Physical Exam:  General:  Awake, alert, NAD  Skin:  Warm and dry  Neck:  JVP unable to assess  Chest: Clear to auscultation though diminished anteriorly  Cardiovascular:  RRR, normal Z3T2, + systolic murmur, no GR  Abdomen:  Soft, nontender, +bowel sounds  Extremities: 1+ nonpitting BLE edema      Medications:    heparin (porcine)        sacubitril-valsartan  1 tablet Oral BID    epoetin siddharth-epbx  10,000 Units IntraVENous Q MWF    insulin lispro  0-3 Units SubCUTAneous Nightly    insulin lispro  0-6 Units SubCUTAneous TID WC    mupirocin   Topical Daily    QUEtiapine  25 mg Oral Nightly    apixaban  5 mg Oral BID    b complex-C-folic acid  1 mg Oral Daily    calcium acetate  1 capsule Oral TID WC    clopidogrel  75 mg Oral Daily    docusate sodium  100 mg Oral Daily    DULoxetine  30 mg Oral Daily    isosorbide dinitrate  10 mg Oral TID    tiotropium-olodaterol  2 puff Inhalation Daily    sennosides-docusate sodium  1 tablet Oral Daily    pravastatin  40 mg Oral Nightly    pantoprazole  40 mg Oral QAM AC    metoprolol succinate  50 mg Oral BID    sodium chloride flush  5-40 mL IntraVENous 2 times per day    piperacillin-tazobactam  3,375 mg IntraVENous Q12H    vancomycin (VANCOCIN) intermittent dosing (placeholder)   Other RX Placeholder      dextrose      sodium chloride         Lab Data: Lab results independently reviewed and analyzed by myself 6/1/2022    CBC:   Recent Labs     05/30/22  0450 05/31/22  0449 06/01/22  0650   WBC 9.4 9.4 9.6   HGB 8.5* 8.7* 8.6*    298 286     BMP:    Recent Labs     05/30/22  0450 05/31/22  0449 06/01/22  0650   * 132* 129*   K 5.1 4.3 4.8   CO2 17* 20* 19*   BUN 69* 39* 50*   CREATININE 8.4* 5.7* 6.9*     INR:  No results for input(s): INR in the last 72 hours. BNP:  No results for input(s): PROBNP in the last 72 hours. Cardiac Enzymes:   Recent Labs     05/29/22  1823 05/29/22  2358 05/30/22  0450   TROPONINI 0.10* 0.09* 0.09*     Lipids:   Lab Results   Component Value Date    TRIG 213 03/18/2022    TRIG 214 11/30/2021    HDL 38 03/18/2022    HDL 53 11/30/2021    LDLCALC 86 03/18/2022    LDLCALC 155 11/30/2021    LDLDIRECT 199 09/04/2014    LDLDIRECT 172 03/07/2014       Cardiac Imaging:    Coronary angiogram 1/14/2022:  1. Left heart catheterization  2. Selective left and right coronary angiogram  3. PCI of the RCA with PATRICIA  Procedure Findings:  1. 99% mid RCA  2. 60-70% CIRC and DIAG  3.  Elevated left heart hemodynamics              ~LVEDP 30       Details:              Varinder Abad was brought to the cardiac catheterization lab in a fasting state after informed consent was obtained. If the patient was able to provide written consent, it was obtained. The patient's vitals were monitored through out the procedure. The patient was sedated using the appropriate levels of sedation and ASA guidelines. The appropriate access site area was prepped and drapped in a sterile fashion. The area was anesthetized with 2% lidocaine. Using the modified Seldinger technique, an arterial sheath was introduced into the arterial access site using a single anterior wall puncture. The sheath was flushed and prepped in usual fashion. Catheters used during the procedure included 5 Thai TIG 4.0 catheter. The catheters were advanced and removed over a .035\" wire, into the appropriate positions. Multiple angiographic views were obtained. An LV gram was not obtained. ~The interventional portion of the procedure was completed utilizing a multipurpose 6 [place] guide. Multipurpose guide was advanced into the right coronary ostium. A gentleman therapy aspirin, Plavix, Angiomax was initiated. A BMW wire was advanced into the distal right coronary artery. The lesion was initially predilated with a 2.75 x 15 mm Euphora balloon followed by a 3.5 mm x 20 mm Euphora balloon and subsequently stented with a resolute Willam 4.0 mm x 38 mm balloon. We did experience a no reflow phenomenon. We separately done. A twin pass catheter and then did local drug delivery with 200 mcg of nitroprusside and 200 mcgnitroglycerin. Provided restoration of SCARLETT-3 flow. Findings:  1. Left main coronary artery was normal. It gave off the left anterior descending artery and left circumflex. 2. Left anterior descending artery has 60% severe atherosclerotic disease. It was moderate in size. It gave off septal perforators and a moderate sized diagonal branch. The LAD covered the entire apex of the left ventricle. 3. Left circumflex has 60% severe atherosclerotic disease.   It was moderate in size. There was a moderate sized obtuse marginal branch. 4. Right coronary artery has 99% severe atherosclerotic disease. It was moderate in size and was the dominant artery. CONCLUSIONS:  1. Apercutaneous coronary intervention of the right coronary artery utilizing a single resolute Willam drug-eluting stent  ASSESSMENT/RECOMMENDATIONS:  1. Dual antiplatelet therapy  2. Medical management of the remaining coronary disease     Echo 1/2022:   Limited only f/u for LVEF and RVF. The left ventricular systolic function is mildly reduced with an ejection   fraction of 40-45 %. There is hypokinesis of the apex and inferior walls. There is a very small circumferential pericardial effusion noted. No tamponade physiology. The right ventricle is normal in size and function. Echo 7/9/2020:  Low Normal systolic function with an estimated ejection fraction of 50%. Left ventricular function and wall motion is difficult to estimate due to   poor endocardial visualization. Grade I diastolic dysfunction with normal filing pressure. Normally functioning TAVR 29 mm Langford Leyda S3 bioprosthetic valve in   aortic position appears well seated with a max velocity of 2.11 m/sec,   maximum gradient of 18 mmHg and a mean gradient of 8 mmHg. There is no evidence of aortic regurgitation  Coronary angiogram 8/22/2019:  LM <20%  LAD patent stent  Cx <20%  RCA 30%  Severe AS by echo  Proceed w/ TAVR  Echo 1/24/2017:  Summary   Left ventricular systolic function is normal with an ejection fraction of   55-60%. No wall motion abnormalities noted. Mild concentric left ventricular hypertrophy. Grade II diastolic dysfunction with elevated filling pressure. Mildly dilated left atrium. Moderate aortic stenosis. Mild aortic regurgitation. Compared to previous study from 10/27/2016, aortic stenosis has not   progressed.         R/LHC 1/23/2017:  LM <20%  LAD 30% w/ patent stent  Cx 20-30%  RCA 20-30%  LVEDP 19  RA 8, RV 41/10, PA 45/14/31, W 15      Nonobstructive CAD  Mild elevated right heart pressures      Echo Oct 2016:   Normal left ventricle size with mild concentric left ventricular hypertrophy.   Normal systolic function with an estimated ejection fraction of 55%.   No regional wall motion abnormalities are seen.   Elevated left ventricular diastolic filling pressure ( E/e' 21 ).  The aortic valve is thickened/calcified with decreased leaflet mobility. Cannot exclude a bicuspid valve.  The aortic valve area is calculated at 1.0 cm2 with a max velocity of 3.36 m/sec, maximum pressure gradient of 45 mmHg and a mean pressure gradient of 23 mmHg. This is c/w moderate aortic stenosis. Color flow demonstrates eccentric jet suggesting mild aortic regurgitation.   Trace mitral and tricuspid regurgitation. Systolic pulmonary artery pressure (SPAP) is normal and estimated at 22 mmHg (RA pressure 3 mmHg).   There is mild concentric left ventricular hypertrophy. MPI 6/18/15: There is a very small and mild fixed posterior-basal defect consistent with  fibrosis. There is no evidence for ischemia. Left ventricular function is reduced with and estimated LVEF of 40%. The LV is severely dilated. CATH: 5/26/15, Dr. Joan Asher  LM <20%   LAD 70% mid.  FFR 0.77  D1 50% prox  Cx 20%  RCA 20%  LVEDP 22mmHg  RA 9, RV 42/10, PA 44/16/31, W 18  PA sat 63%    PTCA LAD  3.0 x 15 PATRICIA  prox portion post 3.5 x 8 NC balloon    DAPT, statin  Resume lasix and ARB at discharge provided Cr stable  Renal fxn will not tolerate higher dose of lasix than 40 daily  Needs smoking cessation, weight loss, exercise  Rec OP sleep eval

## 2022-06-01 NOTE — CARE COORDINATION
Case Management/Follow up:    Chart reviewed for length of stay. Hospital day #  3     Unit:  B-3    Diagnosis and current status as per MD progress:      Anticipated d/c date: possibly today? ? MD discharge summary noted, but no dc order yet. Appears Pt waiting for results of TTE and stress test. Per notes, IV infiltrated in Nuclear medicine and no access at this time as Pt is a hard stick. Nephrology following- plan extra UF session tomorrow. Expected plan for discharge: home with no needs    Potential barriers: none identified. Precert required/date initiated: n/a at this time    Confirmed plan with patient and/or family: yes, prior    Comments: will follow.     Home 02 in place at home prior to admission: yes  Pt/family informed of need to bring portable home 02 tank on day of d/c: yes

## 2022-06-01 NOTE — PROGRESS NOTES
[facility] Progress Note           Subjective/   45y.o. year old male who we are seeing in consultation for ESKD on HD. HPI:  HD MWF  Seen during HD today    ROS:  SOB helped by breathing treatments  Not reaching DW at HD today, will need extra UF session tomorrow    SOC: no visitors      Objective/   GEN:  Chronically ill, /66   Pulse 61   Temp 97.3 °F (36.3 °C) (Axillary)   Resp 20   Wt 240 lb 4.8 oz (109 kg)   SpO2 100%   BMI 38.79 kg/m²   HEENT: non-icteric, no JVD  CV: S1, S2 without m/r/g; no LE edema  RESP: CTA B without w/r/r; breathing wnl  ABD: +bs, soft, nt, no hsm  SKIN: warm, no rashes  ACCESS: Left IJ [facility]    Data/  Recent Labs     05/30/22  0450 05/31/22  0449 06/01/22  0650   WBC 9.4 9.4 9.6   HGB 8.5* 8.7* 8.6*   HCT 25.7* 26.2* 26.6*   MCV 90.5 90.5 92.9    298 286     Recent Labs     05/29/22  0944 05/29/22  0944 05/29/22  1017 05/30/22  0450 05/31/22  0449   *  --   --  128* 132*   K 4.5  --   --  5.1 4.3   CL 87*  --   --  87* 96*   CO2 24  --   --  17* 20*   GLUCOSE 59*   < > 51 138* 65*   BUN 63*  --   --  69* 39*   CREATININE 8.0*  --   --  8.4* 5.7*   LABGLOM 7*  --   --  7* 10*   GFRAA 9*  --   --  8* 13*    < > = values in this interval not displayed. Assessment/     # End stage kidney disease - on HD Mon-Wed-Fri     # Acute on chronic respiratory failure r/o sepsis - started on antibiotics     # sCHF - EF ~40%, decompensated     # Hypertension - poorly controlled     # Anemia of chronic disease -        Plan/     - HD per schedule with UF as tolerated, EDW has been reduced to 109.5 kg, not likley to reach today; SCHEDULE EXTRA UF SESSION FOR TOMORROW    - DAVON with HD - increased dose to 10K qHD      ____________________________________  Zakia Dillard MD  [facility]  [email or web address]  Office: [phone]

## 2022-06-01 NOTE — PROGRESS NOTES
06/01/22 0009   NIV Type   $NIV $Daily Charge   Equipment Type v60   Mode AVAPS   Mask Type Full face mask   Mask Size Large   Settings/Measurements   CPAP/EPAP 6 cmH2O   IPAP Min 18 cmH2O   IPAP Max 25 cmH2O   Vt (Set, mL) 700 mL   Rate Ordered 15   Resp 24   FiO2  30 %   I Time/ I Time % 1 s   Vt Exhaled 701 mL   Mask Leak (lpm) 26 lpm   Humidity 16.5   Comfort Level Good   Using Accessory Muscles No   SpO2 96   Alarm Settings   Alarms On Y   Low Pressure 6 cmH2O   High Pressure  30 cmH2O   Apnea (secs) 20 secs   RR Low  6   RR High 40 br/min

## 2022-06-01 NOTE — PROGRESS NOTES
Dr. Peri Ledezma aware of pt no longer having IV access, switched IV ATB's, still want IV for stress tomorrow, will relay to night shift nurse.

## 2022-06-01 NOTE — CARE COORDINATION
CM called and left message w/Terrence CM in order to find out if there is assistance for pt to get resources for [facility] or other back of the arm glucometer devices, per pt request.  CM also gave pt information regarding program assistance. CM will follow for needs including possible HHC.   Awa Escoto RN Yes

## 2022-06-01 NOTE — FLOWSHEET NOTE
06/01/22 0747 06/01/22 1125   Vital Signs   BP (!) 132/108 133/66   Temp 97.9 °F (36.6 °C)  --    Heart Rate (!) 129 58   Resp 18 18   Weight 249 lb 1.9 oz (113 kg) 242 lb 1 oz (109.8 kg)   Weight Method Bed scale Bed scale   Percent Weight Change 3.67 -2.83   Dry Weight 241 lb 6.5 oz (109.5 kg)  --    Treatment Initiation   Dialyze Hours 3.5  --    Post-Hemodialysis Assessment   Rinseback Volume (ml)  --  400 ml   Total Liters Processed (l/min)  --  74.5 l/min   Dialyzer Clearance  --  Moderately streaked   Duration of Treatment (minutes)  --  210 minutes   Hemodialysis Intake (ml)  --  500 ml   Hemodialysis Output (ml)  --  3500 ml   NET Removed (ml)  --  3000   Tolerated Treatment  --  Good   Tolerated 3. 5hour hd tx well, net UF 3.5L. Vanc 500mg ivpb and epo 10,000 IVP given as ordered. Note new dry wt established at 107.5kg per md.  Report to floor RN and tx record placed in chart for scan into the EMR.

## 2022-06-02 ENCOUNTER — APPOINTMENT (OUTPATIENT)
Dept: NUCLEAR MEDICINE | Age: 52
DRG: 871 | End: 2022-06-02
Payer: COMMERCIAL

## 2022-06-02 LAB
BLOOD CULTURE, ROUTINE: NORMAL
GLUCOSE BLD-MCNC: 209 MG/DL (ref 70–99)
GLUCOSE BLD-MCNC: 86 MG/DL (ref 70–99)
GLUCOSE BLD-MCNC: 87 MG/DL (ref 70–99)
LV EF: 26 %
LVEF MODALITY: NORMAL
PERFORMED ON: ABNORMAL
PERFORMED ON: NORMAL
PERFORMED ON: NORMAL
VANCOMYCIN RANDOM: 14 UG/ML

## 2022-06-02 PROCEDURE — 93017 CV STRESS TEST TRACING ONLY: CPT

## 2022-06-02 PROCEDURE — 6370000000 HC RX 637 (ALT 250 FOR IP): Performed by: INTERNAL MEDICINE

## 2022-06-02 PROCEDURE — A9502 TC99M TETROFOSMIN: HCPCS | Performed by: INTERNAL MEDICINE

## 2022-06-02 PROCEDURE — 6360000002 HC RX W HCPCS: Performed by: INTERNAL MEDICINE

## 2022-06-02 PROCEDURE — 94660 CPAP INITIATION&MGMT: CPT

## 2022-06-02 PROCEDURE — 80202 ASSAY OF VANCOMYCIN: CPT

## 2022-06-02 PROCEDURE — 36415 COLL VENOUS BLD VENIPUNCTURE: CPT

## 2022-06-02 PROCEDURE — 78452 HT MUSCLE IMAGE SPECT MULT: CPT

## 2022-06-02 PROCEDURE — 1200000000 HC SEMI PRIVATE

## 2022-06-02 PROCEDURE — 2580000003 HC RX 258: Performed by: INTERNAL MEDICINE

## 2022-06-02 PROCEDURE — 94761 N-INVAS EAR/PLS OXIMETRY MLT: CPT

## 2022-06-02 PROCEDURE — 2500000003 HC RX 250 WO HCPCS: Performed by: INTERNAL MEDICINE

## 2022-06-02 PROCEDURE — 2700000000 HC OXYGEN THERAPY PER DAY

## 2022-06-02 PROCEDURE — 99232 SBSQ HOSP IP/OBS MODERATE 35: CPT | Performed by: NURSE PRACTITIONER

## 2022-06-02 PROCEDURE — 3430000000 HC RX DIAGNOSTIC RADIOPHARMACEUTICAL: Performed by: INTERNAL MEDICINE

## 2022-06-02 PROCEDURE — 90935 HEMODIALYSIS ONE EVALUATION: CPT

## 2022-06-02 RX ORDER — OXYCODONE AND ACETAMINOPHEN 7.5; 325 MG/1; MG/1
1 TABLET ORAL ONCE
Status: COMPLETED | OUTPATIENT
Start: 2022-06-02 | End: 2022-06-02

## 2022-06-02 RX ORDER — GABAPENTIN 100 MG/1
200 CAPSULE ORAL 3 TIMES DAILY
Status: DISCONTINUED | OUTPATIENT
Start: 2022-06-02 | End: 2022-06-08 | Stop reason: HOSPADM

## 2022-06-02 RX ORDER — LACTOBACILLUS RHAMNOSUS GG 10B CELL
1 CAPSULE ORAL
Status: DISCONTINUED | OUTPATIENT
Start: 2022-06-02 | End: 2022-06-08 | Stop reason: HOSPADM

## 2022-06-02 RX ORDER — OXYCODONE AND ACETAMINOPHEN 7.5; 325 MG/1; MG/1
1 TABLET ORAL EVERY 6 HOURS PRN
Status: DISCONTINUED | OUTPATIENT
Start: 2022-06-02 | End: 2022-06-08 | Stop reason: HOSPADM

## 2022-06-02 RX ADMIN — PANTOPRAZOLE SODIUM 40 MG: 40 TABLET, DELAYED RELEASE ORAL at 06:15

## 2022-06-02 RX ADMIN — TETROFOSMIN 33.8 MILLICURIE: 1.38 INJECTION, POWDER, LYOPHILIZED, FOR SOLUTION INTRAVENOUS at 10:15

## 2022-06-02 RX ADMIN — CLOPIDOGREL BISULFATE 75 MG: 75 TABLET ORAL at 15:39

## 2022-06-02 RX ADMIN — CYCLOBENZAPRINE 10 MG: 10 TABLET, FILM COATED ORAL at 19:37

## 2022-06-02 RX ADMIN — METOPROLOL SUCCINATE 50 MG: 50 TABLET, EXTENDED RELEASE ORAL at 20:37

## 2022-06-02 RX ADMIN — APIXABAN 5 MG: 5 TABLET, FILM COATED ORAL at 20:37

## 2022-06-02 RX ADMIN — NEPHROCAP 1 MG: 1 CAP ORAL at 15:39

## 2022-06-02 RX ADMIN — OXYCODONE AND ACETAMINOPHEN 1 TABLET: 7.5; 325 TABLET ORAL at 15:44

## 2022-06-02 RX ADMIN — MUPIROCIN: 20 OINTMENT TOPICAL at 07:53

## 2022-06-02 RX ADMIN — REGADENOSON 0.4 MG: 0.08 INJECTION, SOLUTION INTRAVENOUS at 10:15

## 2022-06-02 RX ADMIN — SODIUM CHLORIDE, PRESERVATIVE FREE 10 ML: 5 INJECTION INTRAVENOUS at 20:39

## 2022-06-02 RX ADMIN — PRAVASTATIN SODIUM 40 MG: 40 TABLET ORAL at 20:37

## 2022-06-02 RX ADMIN — ISOSORBIDE DINITRATE 10 MG: 10 TABLET ORAL at 20:36

## 2022-06-02 RX ADMIN — DULOXETINE HYDROCHLORIDE 30 MG: 30 CAPSULE, DELAYED RELEASE ORAL at 15:39

## 2022-06-02 RX ADMIN — TETROFOSMIN 11.8 MILLICURIE: 1.38 INJECTION, POWDER, LYOPHILIZED, FOR SOLUTION INTRAVENOUS at 08:25

## 2022-06-02 RX ADMIN — SACUBITRIL AND VALSARTAN 1 TABLET: 24; 26 TABLET, FILM COATED ORAL at 20:37

## 2022-06-02 RX ADMIN — GABAPENTIN 200 MG: 100 CAPSULE ORAL at 20:37

## 2022-06-02 RX ADMIN — QUETIAPINE FUMARATE 25 MG: 25 TABLET ORAL at 20:37

## 2022-06-02 RX ADMIN — CALCIUM ACETATE 667 MG: 667 CAPSULE ORAL at 18:16

## 2022-06-02 ASSESSMENT — PAIN DESCRIPTION - LOCATION
LOCATION: BACK

## 2022-06-02 ASSESSMENT — PAIN DESCRIPTION - ORIENTATION
ORIENTATION: LOWER
ORIENTATION: LOWER;LEFT;RIGHT
ORIENTATION: LOWER;LEFT;RIGHT
ORIENTATION: LOWER

## 2022-06-02 ASSESSMENT — PAIN SCALES - GENERAL
PAINLEVEL_OUTOF10: 3
PAINLEVEL_OUTOF10: 3
PAINLEVEL_OUTOF10: 0
PAINLEVEL_OUTOF10: 0
PAINLEVEL_OUTOF10: 8
PAINLEVEL_OUTOF10: 9

## 2022-06-02 ASSESSMENT — PAIN DESCRIPTION - FREQUENCY
FREQUENCY: INTERMITTENT
FREQUENCY: CONTINUOUS
FREQUENCY: INTERMITTENT

## 2022-06-02 ASSESSMENT — PAIN DESCRIPTION - ONSET
ONSET: ON-GOING
ONSET: ON-GOING

## 2022-06-02 ASSESSMENT — PAIN - FUNCTIONAL ASSESSMENT
PAIN_FUNCTIONAL_ASSESSMENT: PREVENTS OR INTERFERES SOME ACTIVE ACTIVITIES AND ADLS
PAIN_FUNCTIONAL_ASSESSMENT: ACTIVITIES ARE NOT PREVENTED
PAIN_FUNCTIONAL_ASSESSMENT: ACTIVITIES ARE NOT PREVENTED

## 2022-06-02 ASSESSMENT — PAIN DESCRIPTION - DESCRIPTORS
DESCRIPTORS: ACHING
DESCRIPTORS: ACHING
DESCRIPTORS: STABBING
DESCRIPTORS: ACHING

## 2022-06-02 ASSESSMENT — PAIN DESCRIPTION - PAIN TYPE
TYPE: CHRONIC PAIN

## 2022-06-02 NOTE — PROGRESS NOTES
[facility] Progress Note           Subjective/   45y.o. year old male who we are seeing in consultation for ESKD on HD. HPI:  The patient was seen and examined; he feels well today with no CP, SOB, nausea or vomiting. ROS: No fever or chills. Social: No family at bedside. Objective/   GEN:  Chronically ill, BP (!) 146/76   Pulse 69   Temp 97.6 °F (36.4 °C)   Resp 18   Wt 238 lb 12.1 oz (108.3 kg)   SpO2 98%   BMI 38.54 kg/m²      HEENT: non-icteric, no JVD  CV: S1, S2 without m/r/g; no LE edema  RESP: CTA B without w/r/r; breathing wnl  ABD: +bs, soft, nt, no hsm  SKIN: warm, no rashes  ACCESS: Left IJ [facility]    Data/  Recent Labs     05/31/22  0449 06/01/22  0650   WBC 9.4 9.6   HGB 8.7* 8.6*   HCT 26.2* 26.6*   MCV 90.5 92.9    286     Recent Labs     05/31/22  0449 06/01/22  0650   * 129*   K 4.3 4.8   CL 96* 91*   CO2 20* 19*   GLUCOSE 65* 72   BUN 39* 50*   CREATININE 5.7* 6.9*   LABGLOM 10* 8*   GFRAA 13* 10*       Assessment/     # End stage kidney disease - on HD Mon-Wed-Fri     # Acute on chronic respiratory failure r/o sepsis - started on antibiotics     # sCHF - EF ~40%, decompensated     # Hypertension - poorly controlled     # Anemia of chronic disease -        Plan/     - HD per MWF schedule with UF as tolerated.   - EDW has been reduced to 109.5 kgs  - DAVON increased to 10K qHD

## 2022-06-02 NOTE — PROGRESS NOTES
A Lexiscan Myoview  stress test was completed on this patient as ordered. The patient tolerated the procedure well. Awaiting stress imaging at this time.

## 2022-06-02 NOTE — FLOWSHEET NOTE
Treatment time: 2 hours  Net UF: 3000 ml     Pre weight: 108.3 kg   Post weight: 105.5 kg  EDW: 107 kg     Access used: lcw cvc  Access function: Good with  ml/min     Medications or blood products given: None     Regular outpatient schedule: [facility], MWF     Summary of response to treatment: Tolerated tx well.  No HD related complaints or complications.      Copy of dialysis treatment record placed in chart, to be scanned into EMR.       06/02/22 1250 06/02/22 1500   Treatment   Time On  --  1254   Time Off  --  1456   Vital Signs   BP (!) 146/76 (!) 147/82   Temp 97.6 °F (36.4 °C) 98.9 °F (37.2 °C)   Heart Rate 69 58   Resp 18 18

## 2022-06-02 NOTE — ACP (ADVANCE CARE PLANNING)
Advance Care Planning   The patient has the following advanced directives on file:  Advance Directives     Power of  Living Will ACP-Advance Directive ACP-Power of     Not on File [place] on 10/24/19 Leander Cramer          The patient has appointed the following active healthcare agents:    Primary Decision Maker: Crystal Ann - Spouse - [phone]    Secondary Decision Maker: Rhina Ty - Brother/Sister - [phone]    The Patient has the following current code status:    Code Status: Full Code    Visit Documentation:  I discussed Advance Care Planning with Jamie Peyman today which included the patient's choices for care and treatment in the case of a health event that adversely affects decision-making abilities. He stated the Advance Care Directives on file are current. We discussed his current values, goals and care preferences at the end of life. Jamie Morley has no questions at this time and has agreed to keep me up-to-date should anything change.          Steve Pradhan, RN  6/2/2022

## 2022-06-02 NOTE — PROGRESS NOTES
Hospitalist Progress Note      PCP: Yaw Kitchen MD    Date of Admission: 5/29/2022    Chief Complaint: confusion at home       Subjective:  Still short of breath.   Diarrhea with incontinence twice this AM.  Feels terrible in general.        Medications:  Reviewed    Infusion Medications    dextrose      sodium chloride       Scheduled Medications    lactobacillus  1 capsule Oral Daily with breakfast    gabapentin  200 mg Oral TID    oxyCODONE-acetaminophen  1 tablet Oral Once    sacubitril-valsartan  1 tablet Oral BID    epoetin siddharth-epbx  10,000 Units IntraVENous Q MWF    insulin lispro  0-3 Units SubCUTAneous Nightly    insulin lispro  0-6 Units SubCUTAneous TID WC    mupirocin   Topical Daily    QUEtiapine  25 mg Oral Nightly    apixaban  5 mg Oral BID    b complex-C-folic acid  1 mg Oral Daily    calcium acetate  1 capsule Oral TID WC    clopidogrel  75 mg Oral Daily    docusate sodium  100 mg Oral Daily    DULoxetine  30 mg Oral Daily    isosorbide dinitrate  10 mg Oral TID    tiotropium-olodaterol  2 puff Inhalation Daily    sennosides-docusate sodium  1 tablet Oral Daily    pravastatin  40 mg Oral Nightly    pantoprazole  40 mg Oral QAM AC    metoprolol succinate  50 mg Oral BID    sodium chloride flush  5-40 mL IntraVENous 2 times per day     PRN Meds: oxyCODONE-acetaminophen, perflutren lipid microspheres, regadenoson, glucose, dextrose bolus **OR** dextrose bolus, glucagon (rDNA), dextrose, albuterol sulfate HFA, calcium carbonate, cyclobenzaprine, ipratropium-albuterol, nitroGLYCERIN, mineral oil, oxyCODONE-acetaminophen, sodium chloride flush, sodium chloride, ondansetron **OR** ondansetron, polyethylene glycol, acetaminophen **OR** acetaminophen, hydrOXYzine, traZODone      Intake/Output Summary (Last 24 hours) at 6/2/2022 0850  Last data filed at 6/1/2022 2300  Gross per 24 hour   Intake 660 ml   Output 3575 ml   Net -2915 ml       Physical Exam Performed:    BP (!) 161/89   Pulse 63   Temp 97.6 °F (36.4 °C) (Oral)   Resp 18   Wt 250 lb 10.6 oz (113.7 kg)   SpO2 98%   BMI 40.46 kg/m²       General appearance: No apparent distress, appears stated age and cooperative. HEENT: Pupils equal, round, and reactive to light. Conjunctivae/corneas clear. Neck: Supple, with full range of motion. No jugular venous distention. Trachea midline. Respiratory:  Normal respiratory effort. Clear to auscultation, bilaterally without Rales/Wheezes/Rhonchi. Diminished throughout. Cardiovascular: Regular rate and rhythm with normal S1/S2 without murmurs, rubs or gallops. Abdomen: Soft, non-tender, non-distended with normal bowel sounds. Musculoskeletal: No clubbing, cyanosis or edema bilaterally. Full range of motion without deformity. Skin: Skin color, texture, turgor normal.  No rashes or lesions. Neurologic:  Neurovascularly intact without any focal sensory/motor deficits. Cranial nerves: II-XII intact, grossly non-focal.  Psychiatric: Alert and oriented, thought content appropriate, normal insight. Anxious affect. Capillary Refill: Brisk,3 seconds, normal   Peripheral Pulses: +2 palpable, equal bilaterally       Labs:   Recent Labs     05/31/22  0449 06/01/22  0650   WBC 9.4 9.6   HGB 8.7* 8.6*   HCT 26.2* 26.6*    286     Recent Labs     05/31/22  0449 06/01/22  0650   * 129*   K 4.3 4.8   CL 96* 91*   CO2 20* 19*   BUN 39* 50*   CREATININE 5.7* 6.9*   CALCIUM 8.4 7.8*     No results for input(s): AST, ALT, BILIDIR, BILITOT, ALKPHOS in the last 72 hours. No results for input(s): INR in the last 72 hours. No results for input(s): Vida Comment in the last 72 hours.     Urinalysis:      Lab Results   Component Value Date    NITRU Negative 05/29/2022    WBCUA 10-20 05/29/2022    BACTERIA 3+ 05/29/2022    RBCUA 5-10 05/29/2022    BLOODU TRACE-INTACT 05/29/2022    SPECGRAV 1.015 05/29/2022    GLUCOSEU 100 05/29/2022       Radiology:  CT HEAD WO CONTRAST   Final Result   No acute intracranial abnormality. CT CHEST PULMONARY EMBOLISM W CONTRAST   Final Result   Negative for acute pulmonary embolus. Bilateral patchy areas of atelectasis or infiltrate in the lungs most   pronounced in the left lower lobe. Bilateral pleural effusions (left greater than right). Mediastinal adenopathy possibly reactive in nature. Recommend follow-up CT   chest in 1-3 months to confirm resolution unless clinically indicated sooner. XR CHEST PORTABLE   Final Result   1. Central predominant edema potentially of cardiac or noncardiogenic origin. Pneumonia could appear similar. 2. Increased left basilar airspace opacity due in part to passive atelectasis   given at least trace to small left pleural effusion. Underlying rounded   atelectasis is likely present given the prior CT appearance, and superimposed   pneumonia and aspiration are not excluded. 3. At least trace right pleural effusion. NM Cardiac Stress Test Nuclear Imaging    (Results Pending)           Assessment/Plan:    Active Hospital Problems    Diagnosis     Coronary artery disease of native artery of native heart with stable angina pectoris ([facility] [I25.118]      Priority: High    Altered mental status [R41.82]      Priority: Medium    Hypoglycemia [E16.2]      Priority: Medium    History of transcatheter aortic valve replacement (TAVR) [Z95.2]      Priority: Medium    ESRD (end stage renal disease) ([facility] [facility] [N18.6]     Elevated troponin [R77.8]        \"46 y.o. male  with multiple medical issueswho [facility]chest pain. Pt states he has chronic sob and chest pain(has element of anxiety) and yesterday he developed inc'd sob that was fairly sudden onset and continued into the night. He also noted ongoing substernal chest pain, nonradiating that would last a few hours(would subside on its own with deep breathing). \"      HCAP, with sepsis present on arrival. No guiding culture data. Completed course of empiric vanc and zosyn. Complicated by diarrhea, likely abx-induced. Patient has h/o c.diff. Continue to reassess the extent of the diarrhea now that he is off abx. Probiotic. ESRD on HD. Nephrology removed fluid, lowered target weight, added extra ultrafiltration session on 6/2. Chronic hypoxic respiratory failure. Baseline 3-4LNC. Chest pain, with known h/o CAD.  Appreciate cardiology input, TTE and stress test are pending at the time of this note.  Continue clopidogrel, statin, metoprolol, ISDN.     DM2.  Complicated by hypoglycemia on admission, with acute metabolic encephalopathy.  Stopped insulin and his sugars were fine.  F/u with PCP.     HTN. ISDN, metoprolol. Cardiology started entresto.     PAF.  Apixaban, metoprolol.         DVT Prophylaxis: anticoagulation as above  Diet: Diet NPO  Code Status: Full Code    PT/OT Eval Status: not indicated    Dispo - perhaps 6/3. We need TTE, stress test, achievement of dry weight, and control of diarrhea. It looks like this patient has come to our ED 9 times this year, and been admitted many of those times. Will hold off on discharge today and make sure he is doing well tomorrow.        Trisha Sue MD

## 2022-06-02 NOTE — PROGRESS NOTES
[facility]   Daily Progress Note    Admit Date:  5/29/2022  HPI:    Chief Complaint   Patient presents with    Altered Mental Status     wife called 911 for ams, pt sts\" im having a hard time breathing\" wears 3L of oxygen 24/7      Page Ho presented to the hospital with confusion, shortness of breath and chest pain. Yanick Almonte is well-known to me from office and multiple prior admissions. Hx of CAD s/p PCI to LAD in 2015, RCA 1/2022, AS s/p TAVR 2019, ICM, s/d CHF, A. Fib, HYPERTENSION, HLD, DM, PAD s/p PTA R iliac artery, ESRD on HD, chronic anemia, ZAINAB on CPAP, COPD, and hx of CVA. Subjective:  Mr. Penaloza Fess seen in stress lab, still with intermittent chest pain that comes and goes. Still also with shortness of breath that is pretty constant. Diarrhea this AM.    Objective:   Patient Vitals for the past 24 hrs:   BP Temp Temp src Pulse Resp SpO2 Weight   06/02/22 0750 (!) 161/89 97.6 °F (36.4 °C) Oral 63 18 98 % --   06/02/22 0600 -- -- -- -- -- -- 250 lb 10.6 oz (113.7 kg)   06/02/22 0500 (!) 154/84 97.6 °F (36.4 °C) Axillary 61 16 100 % --   06/02/22 0346 -- -- -- -- 15 -- --   06/02/22 0000 (!) 140/87 98.4 °F (36.9 °C) Axillary 63 18 99 % --   06/01/22 2024 -- -- -- -- 16 -- --   06/01/22 1959 -- -- -- -- 16 -- --   06/01/22 1933 -- -- -- 75 -- -- --   06/01/22 1929 (!) 150/77 98.1 °F (36.7 °C) Oral 75 18 96 % --   06/01/22 1125 133/66 -- -- 58 18 -- 242 lb 1 oz (109.8 kg)       Intake/Output Summary (Last 24 hours) at 6/2/2022 1116  Last data filed at 6/1/2022 2300  Gross per 24 hour   Intake 660 ml   Output 3575 ml   Net -2915 ml     Wt Readings from Last 3 Encounters:   06/02/22 250 lb 10.6 oz (113.7 kg)   05/25/22 245 lb (111.1 kg)   04/27/22 245 lb 13 oz (111.5 kg)         ASSESSMENT:   1. Elevated troponin: Flat and stable from prior measurements during prior admissions, complicated by ESRD  2.  Chest pain and shortness of breath: Shortness of breath has been persistent and unresolved despite dialysis/ultrafiltration  3. CAD: Reports symptoms of chest pain and dyspnea, elevated troponins, stress test and echocardiogram in process; on beta-blocker, statin, Plavix, nitrate  4. AS s/p TAVR: Await repeat echocardiogram for further assessment  5. HTN: Labile, poorly controlled  6. Cardiomyopathy, mixed ischemic and nonischemic: EF 40-45%, on Toprol, Entresto recommended  7. PAF: Currently in sinus rhythm, anticoagulation with Eliquis  8. ESRD on HD: Per nephrology, now dialyzing to below EDW  9. DM 2  10. Chronic respiratory failure      PLAN:  1. Stress test and echocardiogram in process, results pending  2. Continue Eliquis for PAF and Plavix for CAD  3. Continue Entresto 24-26 milligrams twice daily as well as Imdur and Toprol-XL  4. He would benefit from rehab facility versus home health nursing/therapy  5.  Further recommendations to come following testing    Geura Waldo, JAY Hu CNP, 6/2/2022, 11:16 AM  [facility]   [phone]       Telemetry: SR 60-70s  NYHA: III    Physical Exam:  General:  Awake, alert, NAD  Skin:  Warm and dry  Neck:  JVP difficult to assess  Chest: Clear to auscultation posteriorly though diminished  Cardiovascular:  RRR, normal D1A9, + systolic murmur, no GR  Abdomen:  Soft, nontender, +bowel sounds  Extremities: 1+ nonpitting BLE edema      Medications:    lactobacillus  1 capsule Oral Daily with breakfast    gabapentin  200 mg Oral TID    oxyCODONE-acetaminophen  1 tablet Oral Once    sacubitril-valsartan  1 tablet Oral BID    epoetin siddharth-epbx  10,000 Units IntraVENous Q MWF    insulin lispro  0-3 Units SubCUTAneous Nightly    insulin lispro  0-6 Units SubCUTAneous TID WC    mupirocin   Topical Daily    QUEtiapine  25 mg Oral Nightly    apixaban  5 mg Oral BID    b complex-C-folic acid  1 mg Oral Daily    calcium acetate  1 capsule Oral TID WC    clopidogrel  75 mg Oral Daily    docusate sodium  100 mg Oral Daily    DULoxetine 30 mg Oral Daily    isosorbide dinitrate  10 mg Oral TID    tiotropium-olodaterol  2 puff Inhalation Daily    sennosides-docusate sodium  1 tablet Oral Daily    pravastatin  40 mg Oral Nightly    pantoprazole  40 mg Oral QAM AC    metoprolol succinate  50 mg Oral BID    sodium chloride flush  5-40 mL IntraVENous 2 times per day      dextrose      sodium chloride         Lab Data: Lab results independently reviewed and analyzed by myself 6/2/2022    CBC:   Recent Labs     05/31/22  0449 06/01/22  0650   WBC 9.4 9.6   HGB 8.7* 8.6*    286     BMP:    Recent Labs     05/31/22  0449 06/01/22  0650   * 129*   K 4.3 4.8   CO2 20* 19*   BUN 39* 50*   CREATININE 5.7* 6.9*     INR:  No results for input(s): INR in the last 72 hours. BNP:  No results for input(s): PROBNP in the last 72 hours. Cardiac Enzymes:   No results for input(s): TROPONINI in the last 72 hours. Lipids:   Lab Results   Component Value Date    TRIG 213 03/18/2022    TRIG 214 11/30/2021    HDL 38 03/18/2022    HDL 53 11/30/2021    LDLCALC 86 03/18/2022    LDLCALC 155 11/30/2021    LDLDIRECT 199 09/04/2014    LDLDIRECT 172 03/07/2014       Cardiac Imaging:    Coronary angiogram 1/14/2022:  1. Left heart catheterization  2. Selective left and right coronary angiogram  3. PCI of the RCA with PATRICIA  Procedure Findings:  1. 99% mid RCA  2. 60-70% CIRC and DIAG  3. Elevated left heart hemodynamics              ~LVEDP 30       Details:              Geraldo Ervin was brought to the cardiac catheterization lab in a fasting state after informed consent was obtained. If the patient was able to provide written consent, it was obtained. The patient's vitals were monitored through out the procedure. The patient was sedated using the appropriate levels of sedation and ASA guidelines. The appropriate access site area was prepped and drapped in a sterile fashion. The area was anesthetized with 2% lidocaine.  Using the modified Seldinger technique, an arterial sheath was introduced into the arterial access site using a single anterior wall puncture. The sheath was flushed and prepped in usual fashion. Catheters used during the procedure included 5 Romansh TIG 4.0 catheter. The catheters were advanced and removed over a .035\" wire, into the appropriate positions. Multiple angiographic views were obtained. An LV gram was not obtained. ~The interventional portion of the procedure was completed utilizing a multipurpose 6 [place] guide. Multipurpose guide was advanced into the right coronary ostium. A gentleman therapy aspirin, Plavix, Angiomax was initiated. A BMW wire was advanced into the distal right coronary artery. The lesion was initially predilated with a 2.75 x 15 mm Euphora balloon followed by a 3.5 mm x 20 mm Euphora balloon and subsequently stented with a resolute [place] 4.0 mm x 38 mm balloon. We did experience a no reflow phenomenon. We separately done. A twin pass catheter and then did local drug delivery with 200 mcg of nitroprusside and 200 mcgnitroglycerin. Provided restoration of SCARLETT-3 flow. Findings:  1. Left main coronary artery was normal. It gave off the left anterior descending artery and left circumflex. 2. Left anterior descending artery has 60% severe atherosclerotic disease. It was moderate in size. It gave off septal perforators and a moderate sized diagonal branch. The LAD covered the entire apex of the left ventricle. 3. Left circumflex has 60% severe atherosclerotic disease. It was moderate in size. There was a moderate sized obtuse marginal branch. 4. Right coronary artery has 99% severe atherosclerotic disease. It was moderate in size and was the dominant artery. CONCLUSIONS:  1. Apercutaneous coronary intervention of the right coronary artery utilizing a single resolute [place] drug-eluting stent  ASSESSMENT/RECOMMENDATIONS:  1. Dual antiplatelet therapy  2. Medical management of the remaining coronary disease     Echo 1/2022:   Limited only f/u for LVEF and RVF. The left ventricular systolic function is mildly reduced with an ejection   fraction of 40-45 %. There is hypokinesis of the apex and inferior walls. There is a very small circumferential pericardial effusion noted. No tamponade physiology. The right ventricle is normal in size and function. Echo 7/9/2020:  Low Normal systolic function with an estimated ejection fraction of 50%. Left ventricular function and wall motion is difficult to estimate due to   poor endocardial visualization. Grade I diastolic dysfunction with normal filing pressure. Normally functioning TAVR 29 mm Langford Leyda S3 bioprosthetic valve in   aortic position appears well seated with a max velocity of 2.11 m/sec,   maximum gradient of 18 mmHg and a mean gradient of 8 mmHg. There is no evidence of aortic regurgitation  Coronary angiogram 8/22/2019:  LM <20%  LAD patent stent  Cx <20%  RCA 30%  Severe AS by echo  Proceed w/ TAVR  Echo 1/24/2017:  Summary   Left ventricular systolic function is normal with an ejection fraction of   55-60%. No wall motion abnormalities noted. Mild concentric left ventricular hypertrophy. Grade II diastolic dysfunction with elevated filling pressure. Mildly dilated left atrium. Moderate aortic stenosis. Mild aortic regurgitation. Compared to previous study from 10/27/2016, aortic stenosis has not   progressed.         R/LHC 1/23/2017:  LM <20%  LAD 30% w/ patent stent  Cx 20-30%  RCA 20-30%  LVEDP 19  RA 8, RV 41/10, PA 45/14/31, W 15      Nonobstructive CAD  Mild elevated right heart pressures      Echo Oct 2016:   Normal left ventricle size with mild concentric left ventricular hypertrophy.   Normal systolic function with an estimated ejection fraction of 55%.   No regional wall motion abnormalities are seen.   Elevated left ventricular diastolic filling pressure ( E/e' 21 ).   The aortic valve is thickened/calcified with decreased leaflet mobility. Cannot exclude a bicuspid valve.  The aortic valve area is calculated at 1.0 cm2 with a max velocity of 3.36 m/sec, maximum pressure gradient of 45 mmHg and a mean pressure gradient of 23 mmHg. This is c/w moderate aortic stenosis. Color flow demonstrates eccentric jet suggesting mild aortic regurgitation.   Trace mitral and tricuspid regurgitation. Systolic pulmonary artery pressure (SPAP) is normal and estimated at 22 mmHg (RA pressure 3 mmHg).   There is mild concentric left ventricular hypertrophy. MPI 6/18/15: There is a very small and mild fixed posterior-basal defect consistent with  fibrosis. There is no evidence for ischemia. Left ventricular function is reduced with and estimated LVEF of 40%. The LV is severely dilated. CATH: 5/26/15, Dr. Abib Ramirez  LM <20%   LAD 70% mid.  FFR 0.77  D1 50% prox  Cx 20%  RCA 20%  LVEDP 22mmHg  RA 9, RV 42/10, PA 44/16/31, W 18  PA sat 63%    PTCA LAD  3.0 x 15 PATRICIA  prox portion post 3.5 x 8 NC balloon    DAPT, statin  Resume lasix and ARB at discharge provided Cr stable  Renal fxn will not tolerate higher dose of lasix than 40 daily  Needs smoking cessation, weight loss, exercise  Rec OP sleep eval

## 2022-06-02 NOTE — PLAN OF CARE
Problem: Discharge Planning  Goal: Discharge to home or other facility with appropriate resources  6/2/2022 0555 by Lulu Flores RN  Outcome: Progressing  6/2/2022 0554 by Lulu Flores RN  Outcome: Progressing     Problem: Pain  Goal: Verbalizes/displays adequate comfort level or baseline comfort level  6/2/2022 0555 by Lulu Flores RN  Outcome: Progressing  6/2/2022 0554 by Lulu Flores RN  Outcome: Progressing     Problem: Safety - Adult  Goal: Free from fall injury  6/2/2022 0555 by Lulu Flores RN  Outcome: Progressing  6/2/2022 0554 by Lulu Flores RN  Outcome: Progressing     Problem: ABCDS Injury Assessment  Goal: Absence of physical injury  6/2/2022 0555 by Lulu Flores RN  Outcome: Progressing  6/2/2022 0554 by Lulu Flores RN  Outcome: Progressing     Problem: Chronic Conditions and Co-morbidities  Goal: Patient's chronic conditions and co-morbidity symptoms are monitored and maintained or improved  6/2/2022 0555 by Lulu Flores RN  Outcome: Progressing  6/2/2022 0554 by Lulu Flores RN  Outcome: Progressing

## 2022-06-02 NOTE — PROGRESS NOTES
Patient's EF (Ejection Fraction) is greater than 40%    Heart Failure Medications:   Diuretics[de-identified] Furosemide, Torsemide, Spironolactone, Metalozone, Other and None     (One of the following REQUIRED for EF </= 40%/SYSTOLIC FAILURE but MAY be used in EF% >40%/DIASTOLIC FAILURE)        ACE[de-identified] None        ARB[de-identified] None         ARNI[de-identified] None    (Beta Blockers)   NON- Evidenced Based Beta Blocker (for EF% >40%/DIASTOLIC FAILURE): None     Evidenced Based Beta Blocker::(REQUIRED for EF% <40%/SYSTOLIC FAILURE) Metoprolol SUCCinate- Toprol XL  . .................................................................................................................................................. Patient's weights and intake/output reviewed: Yes    Patient's Last Weight: 113.7 kg obtained by bed scale. Difference of 3.9 lbs more than last documented weight.       Intake/Output Summary (Last 24 hours) at 6/2/2022 0636  Last data filed at 6/1/2022 2300  Gross per 24 hour   Intake 660 ml   Output 3575 ml   Net -2915 ml       Comorbidities Reviewed Yes    Patient has a past medical history of Ambulatory dysfunction, Aortic stenosis, Arthritis, Asthma, Bilateral hilar adenopathy syndrome, CAD (coronary artery disease), Cardiomyopathy ([address] CHF (congestive heart failure) ([address] Chronic pain, COPD (chronic obstructive pulmonary disease) ([address] Depression, Diabetes mellitus ([address] Difficult intravenous access, Emphysema of lung ([address] ESRD (end stage renal disease) on dialysis ([address] Fear of needles, Gastric ulcer, GERD (gastroesophageal reflux disease), Heart valve problem, Hemodialysis patient ([address] History of spinal fracture, Hx of blood clots, Hyperlipidemia, Hypertension, MI (myocardial infarction) ([address] Neuromuscular disorder ([address] Numbness and tingling in left arm, Pneumonia, PONV (postoperative nausea and vomiting), Prolonged emergence from general anesthesia, Sleep apnea, Stroke ([address] TIA (transient ischemic attack), and Unspecified diseases of blood and blood-forming organs. >>For CHF and Comorbidity documentation on Education Time and Topics, please see Education Tab    Progressive Mobility Assessment:  What is this patient's Current Level of Mobility?: Ambulatory- with Assistance  How was this patient Mobilized today?: Edge of Bed, Up to Chair, Bedside Commode,  Up to Toilet/Shower, Up in Room, Up in Kimmie, Unable to Mobilize and Patient Refuses to Mobilize, ambulated 10 ft                 With Whom? Nurse, PCA, PT, OT and Self                 Level of Difficulty/Assistance: 1x Assist     Pt resting in bed at this time on CPAP. Pt denies shortness of breath. Pt with nonpitting lower extremity edema.      Patient and/or Family's stated Goal of Care this Admission: reduce shortness of breath, increase activity tolerance, better understand heart failure and disease management, be more comfortable and reduce lower extremity edema prior to discharge

## 2022-06-03 ENCOUNTER — TELEPHONE (OUTPATIENT)
Dept: FAMILY MEDICINE CLINIC | Age: 52
End: 2022-06-03

## 2022-06-03 DIAGNOSIS — G89.4 CHRONIC PAIN SYNDROME: ICD-10-CM

## 2022-06-03 LAB
ANION GAP SERPL CALCULATED.3IONS-SCNC: 14 MMOL/L (ref 3–16)
BUN BLDV-MCNC: 23 MG/DL (ref 7–20)
CALCIUM SERPL-MCNC: 9 MG/DL (ref 8.3–10.6)
CHLORIDE BLD-SCNC: 95 MMOL/L (ref 99–110)
CO2: 24 MMOL/L (ref 21–32)
CREAT SERPL-MCNC: 3.9 MG/DL (ref 0.9–1.3)
GFR AFRICAN AMERICAN: 20
GFR NON-AFRICAN AMERICAN: 16
GLUCOSE BLD-MCNC: 114 MG/DL (ref 70–99)
GLUCOSE BLD-MCNC: 115 MG/DL (ref 70–99)
GLUCOSE BLD-MCNC: 126 MG/DL (ref 70–99)
GLUCOSE BLD-MCNC: 213 MG/DL (ref 70–99)
GLUCOSE BLD-MCNC: 229 MG/DL (ref 70–99)
LV EF: 23 %
LVEF MODALITY: NORMAL
MAGNESIUM: 2.1 MG/DL (ref 1.8–2.4)
PERFORMED ON: ABNORMAL
POTASSIUM REFLEX MAGNESIUM: 3.5 MMOL/L (ref 3.5–5.1)
SODIUM BLD-SCNC: 133 MMOL/L (ref 136–145)
VANCOMYCIN RANDOM: 9.8 UG/ML

## 2022-06-03 PROCEDURE — 97530 THERAPEUTIC ACTIVITIES: CPT

## 2022-06-03 PROCEDURE — 1200000000 HC SEMI PRIVATE

## 2022-06-03 PROCEDURE — 6360000002 HC RX W HCPCS

## 2022-06-03 PROCEDURE — 6370000000 HC RX 637 (ALT 250 FOR IP): Performed by: INTERNAL MEDICINE

## 2022-06-03 PROCEDURE — 80202 ASSAY OF VANCOMYCIN: CPT

## 2022-06-03 PROCEDURE — 2580000003 HC RX 258: Performed by: INTERNAL MEDICINE

## 2022-06-03 PROCEDURE — 2700000000 HC OXYGEN THERAPY PER DAY

## 2022-06-03 PROCEDURE — 94640 AIRWAY INHALATION TREATMENT: CPT

## 2022-06-03 PROCEDURE — 94660 CPAP INITIATION&MGMT: CPT

## 2022-06-03 PROCEDURE — 97116 GAIT TRAINING THERAPY: CPT

## 2022-06-03 PROCEDURE — C8929 TTE W OR WO FOL WCON,DOPPLER: HCPCS

## 2022-06-03 PROCEDURE — 83735 ASSAY OF MAGNESIUM: CPT

## 2022-06-03 PROCEDURE — 80048 BASIC METABOLIC PNL TOTAL CA: CPT

## 2022-06-03 PROCEDURE — 97165 OT EVAL LOW COMPLEX 30 MIN: CPT

## 2022-06-03 PROCEDURE — 94761 N-INVAS EAR/PLS OXIMETRY MLT: CPT

## 2022-06-03 PROCEDURE — 90935 HEMODIALYSIS ONE EVALUATION: CPT

## 2022-06-03 PROCEDURE — 99233 SBSQ HOSP IP/OBS HIGH 50: CPT | Performed by: NURSE PRACTITIONER

## 2022-06-03 PROCEDURE — 97162 PT EVAL MOD COMPLEX 30 MIN: CPT

## 2022-06-03 PROCEDURE — 2500000003 HC RX 250 WO HCPCS: Performed by: INTERNAL MEDICINE

## 2022-06-03 RX ORDER — HEPARIN SODIUM 1000 [USP'U]/ML
INJECTION, SOLUTION INTRAVENOUS; SUBCUTANEOUS
Status: COMPLETED
Start: 2022-06-03 | End: 2022-06-03

## 2022-06-03 RX ORDER — OXYCODONE AND ACETAMINOPHEN 7.5; 325 MG/1; MG/1
1 TABLET ORAL EVERY 6 HOURS PRN
Qty: 120 TABLET | Refills: 0 | Status: SHIPPED | OUTPATIENT
Start: 2022-06-03 | End: 2022-07-05 | Stop reason: SDUPTHER

## 2022-06-03 RX ADMIN — PRAVASTATIN SODIUM 40 MG: 40 TABLET ORAL at 20:32

## 2022-06-03 RX ADMIN — CYCLOBENZAPRINE 10 MG: 10 TABLET, FILM COATED ORAL at 20:33

## 2022-06-03 RX ADMIN — NEPHROCAP 1 MG: 1 CAP ORAL at 12:08

## 2022-06-03 RX ADMIN — CLOPIDOGREL BISULFATE 75 MG: 75 TABLET ORAL at 12:08

## 2022-06-03 RX ADMIN — ISOSORBIDE DINITRATE 10 MG: 10 TABLET ORAL at 14:34

## 2022-06-03 RX ADMIN — PANTOPRAZOLE SODIUM 40 MG: 40 TABLET, DELAYED RELEASE ORAL at 06:20

## 2022-06-03 RX ADMIN — SODIUM CHLORIDE, PRESERVATIVE FREE 10 ML: 5 INJECTION INTRAVENOUS at 20:37

## 2022-06-03 RX ADMIN — APIXABAN 5 MG: 5 TABLET, FILM COATED ORAL at 20:32

## 2022-06-03 RX ADMIN — CALCIUM ACETATE 667 MG: 667 CAPSULE ORAL at 17:25

## 2022-06-03 RX ADMIN — EPOETIN ALFA-EPBX 10000 UNITS: 10000 INJECTION, SOLUTION INTRAVENOUS; SUBCUTANEOUS at 10:48

## 2022-06-03 RX ADMIN — OXYCODONE AND ACETAMINOPHEN 1 TABLET: 7.5; 325 TABLET ORAL at 17:24

## 2022-06-03 RX ADMIN — Medication 1 CAPSULE: at 12:08

## 2022-06-03 RX ADMIN — APIXABAN 5 MG: 5 TABLET, FILM COATED ORAL at 12:08

## 2022-06-03 RX ADMIN — GABAPENTIN 200 MG: 100 CAPSULE ORAL at 20:32

## 2022-06-03 RX ADMIN — DULOXETINE HYDROCHLORIDE 30 MG: 30 CAPSULE, DELAYED RELEASE ORAL at 12:08

## 2022-06-03 RX ADMIN — HEPARIN SODIUM 4100 UNITS: 1000 INJECTION INTRAVENOUS; SUBCUTANEOUS at 11:04

## 2022-06-03 RX ADMIN — SACUBITRIL AND VALSARTAN 1 TABLET: 24; 26 TABLET, FILM COATED ORAL at 20:34

## 2022-06-03 RX ADMIN — METOPROLOL SUCCINATE 50 MG: 50 TABLET, EXTENDED RELEASE ORAL at 12:08

## 2022-06-03 RX ADMIN — METOPROLOL SUCCINATE 50 MG: 50 TABLET, EXTENDED RELEASE ORAL at 20:32

## 2022-06-03 RX ADMIN — SACUBITRIL AND VALSARTAN 1 TABLET: 24; 26 TABLET, FILM COATED ORAL at 12:09

## 2022-06-03 RX ADMIN — CALCIUM ACETATE 667 MG: 667 CAPSULE ORAL at 12:07

## 2022-06-03 RX ADMIN — GABAPENTIN 200 MG: 100 CAPSULE ORAL at 14:34

## 2022-06-03 RX ADMIN — ISOSORBIDE DINITRATE 10 MG: 10 TABLET ORAL at 20:32

## 2022-06-03 RX ADMIN — QUETIAPINE FUMARATE 25 MG: 25 TABLET ORAL at 20:32

## 2022-06-03 ASSESSMENT — PAIN SCALES - GENERAL
PAINLEVEL_OUTOF10: 3
PAINLEVEL_OUTOF10: 4
PAINLEVEL_OUTOF10: 4
PAINLEVEL_OUTOF10: 7
PAINLEVEL_OUTOF10: 7

## 2022-06-03 ASSESSMENT — PAIN - FUNCTIONAL ASSESSMENT
PAIN_FUNCTIONAL_ASSESSMENT: ACTIVITIES ARE NOT PREVENTED

## 2022-06-03 ASSESSMENT — PAIN DESCRIPTION - DESCRIPTORS
DESCRIPTORS: ACHING

## 2022-06-03 ASSESSMENT — PAIN DESCRIPTION - PAIN TYPE
TYPE: CHRONIC PAIN

## 2022-06-03 ASSESSMENT — PAIN DESCRIPTION - LOCATION
LOCATION: BACK

## 2022-06-03 ASSESSMENT — PAIN DESCRIPTION - ONSET
ONSET: ON-GOING
ONSET: ON-GOING

## 2022-06-03 ASSESSMENT — PAIN DESCRIPTION - ORIENTATION
ORIENTATION: LOWER;LEFT;RIGHT
ORIENTATION: LOWER

## 2022-06-03 ASSESSMENT — PAIN DESCRIPTION - FREQUENCY
FREQUENCY: INTERMITTENT
FREQUENCY: CONTINUOUS
FREQUENCY: INTERMITTENT
FREQUENCY: INTERMITTENT

## 2022-06-03 NOTE — TELEPHONE ENCOUNTER
Pt requests :    Last office visit 1/18/2022     Last written 5/4/2022     Next office visit scheduled 6/9/2022    Requested Prescriptions     Pending Prescriptions Disp Refills    oxyCODONE-acetaminophen (PERCOCET) 7.5-325 MG per tablet 120 tablet 0     Sig: Take 1 tablet by mouth every 6 hours as needed (pain) for up to 30 days. [facility] at  [id number] [facility] is correct pharmacy.

## 2022-06-03 NOTE — PROGRESS NOTES
Physical Therapy  Facility/Department: [place] [address] - [facility] SURG  Physical Therapy Initial Assessment    Name: Robinson Law  : 1970  MRN: [id number]  Date of Service: 6/3/2022    Discharge Recommendations:  24 hour supervision or assist,Home with Home health PT   PT Equipment Recommendations  Equipment Needed: No  Other: pt owns RW      Patient Diagnosis(es): The primary encounter diagnosis was Shortness of breath. Diagnoses of Altered mental status, unspecified altered mental status type, Hypoglycemia, Acute on chronic respiratory failure with hypoxia ([address] and Mediastinal adenopathy were also pertinent to this visit. Past Medical History:  has a past medical history of Ambulatory dysfunction, Aortic stenosis, Arthritis, Asthma, Bilateral hilar adenopathy syndrome, CAD (coronary artery disease), Cardiomyopathy ([address] CHF (congestive heart failure) ([address] Chronic pain, COPD (chronic obstructive pulmonary disease) ([address] Depression, Diabetes mellitus ([address] Difficult intravenous access, Emphysema of lung ([address] ESRD (end stage renal disease) on dialysis ([address] Fear of needles, Gastric ulcer, GERD (gastroesophageal reflux disease), Heart valve problem, Hemodialysis patient ([address] History of spinal fracture, Hx of blood clots, Hyperlipidemia, Hypertension, MI (myocardial infarction) ([address] Neuromuscular disorder ([address] Numbness and tingling in left arm, Pneumonia, PONV (postoperative nausea and vomiting), Prolonged emergence from general anesthesia, Sleep apnea, Stroke ([address] TIA (transient ischemic attack), and Unspecified diseases of blood and blood-forming organs. Past Surgical History:  has a past surgical history that includes Tonsillectomy; cyst removal (2013); Colonoscopy; Coronary angioplasty with stent (05/26/15); vascular surgery (aprx 2 years ago); Colonoscopy (); Upper gastrointestinal endoscopy (2016);  Upper gastrointestinal endoscopy (2017); other surgical history (02/01/2017); Dialysis fistula creation (Left, 10/30/2017); Diagnostic Cardiac Cath Lab Procedure; other surgical history (2018); other surgical history (Bilateral, 06/26/2018); pr lap insertion tunneled intraperitoneal catheter (N/A, 9/21/2018); other surgical history (Right, 09/2018); Dialysis fistula creation (Left, 3/27/2019); other surgical history (05/28/2019); Endoscopy, colon, diagnostic; Catheter Removal (Right, 7/2/2019); Aortic valve replacement (N/A, 10/15/2019); Rectal surgery (N/A, 2/24/2022); IR TUNNELED CVC PLACE WO SQ PORT/PUMP > 5 YEARS (3/21/2022); IR TUNNELED CVC PLACE WO SQ PORT/PUMP > 5 YEARS (4/21/2022); and IR TUNNELED CVC PLACE WO SQ PORT/PUMP > 5 YEARS (4/26/2022). Assessment    Body Structures, Functions, Activity Limitations Requiring Skilled Therapeutic Intervention: Decreased functional mobility ; Decreased ADL status; Decreased balance;Decreased endurance;Decreased safe awareness;Decreased strength  Assessment: Patient seen for PT evaluation, transfers, gait, and LE strengthening. Patient cleared by RN for therapy participation this date. Patient agreeable to therapy. Patient admitted for AMS, SOB, and chest pain with PMH ESRD on HD, CAD, DM2, and Afib. Pt previously using 4WW in home and requires assistance for mobility and ADL's. Patient completed mobility with CGA and RW and seated exercises for strengthening. P.T .will continue to follow throughout LOS. Recommending DC to home with 24/7 assistance and home PT.   Treatment Diagnosis: decreased independence wtih mobility  Therapy Prognosis: Good  Decision Making: Medium Complexity  Requires PT Follow-Up: Yes     Plan   Plan  Plan: 3-5 times per week  Current Treatment Recommendations: Strengthening,Balance training,Functional mobility training,Transfer training,Neuromuscular re-education,Gait training,Endurance training,Patient/Caregiver education & training,Safety education & training,Home exercise program,Therapeutic activities  Safety Devices  Type of Devices:  All fall risk precautions in place,Call light within reach,Gait belt,Patient at risk for falls,Left in bed,Nurse notified (pt refusing bed alarm)     Restrictions  Restrictions/Precautions  Restrictions/Precautions: Fall Risk  Position Activity Restriction  Other position/activity restrictions: up with assist, L side dialysis port     Subjective   General  Chart Reviewed: Yes  Patient assessed for rehabilitation services?: Yes  Response To Previous Treatment: Not applicable  Family / Caregiver Present: No  Referring Practitioner: Mary  Referral Date : 06/03/22  Diagnosis: AMS  Follows Commands: Within Functional Limits  Subjective  Subjective: pt in bed, agreeable to therapy         Social/Functional History  Social/Functional History  Lives With: Spouse  Type of Home: House  Home Layout: One level  Home Access: Ramped entrance  Bathroom Shower/Tub: Walk-in shower,Tub/Shower unit (uses Walk In)  [place] Toilet: Standard  Bathroom Equipment: Grab bars in shower  Home Equipment: Rollator,Oxygen (3L baseline)  Has the patient had two or more falls in the past year or any fall with injury in the past year?: Yes (pt reports falling 1x/day, no injury)  Receives Help From: Family  ADL Assistance: Needs assistance (A for LB dressing)  Toileting: Independent  Homemaking Assistance: Needs assistance (does not complete)  Homemaking Responsibilities: No  Ambulation Assistance: Needs assistance (rollator)  Transfer Assistance: Independent (rollator)  Active : Yes (last drove 3 weeks ago)  Occupation: Retired  Type of Occupation:   Leisure & Hobbies: fishing, hunting  Vision/Hearing       Cognition   Orientation  Overall Orientation Status: Impaired  Orientation Level: Disoriented to time;Oriented to place;Oriented to situation;Oriented to person     Objective   Heart Rate: 74  Heart Rate Source: Monitor  BP: (!) 143/88  BP Location: Right upper arm  BP Method: Automatic  Patient Position: Sitting  MAP (Calculated): 106.33  Resp: 18  SpO2: 100 %  O2 Device: Nasal cannula (3L)     Observation/Palpation  Posture: Good        AROM RLE (degrees)  RLE AROM: WFL  AROM LLE (degrees)  LLE AROM : WFL  Strength RLE  Strength RLE: WFL  Comment: at least 3/5 with mobility  Strength LLE  Strength LLE: WFL  Comment: at least 3/5 with mobility           Bed mobility  Supine to Sit: Stand by assistance  Sit to Supine: Stand by assistance  Transfers  Sit to Stand: Contact guard assistance (with RW)  Stand to sit: Contact guard assistance (with RW)  Ambulation  Surface: level tile  Device: Rolling Walker  Assistance: Contact guard assistance  Quality of Gait: Pt demos slow gold, definite need of UE use on RW, and short step length/height without LOB. Gait Deviations: Slow Gold;Decreased step length;Decreased step height  Distance: 2x 8 ft        Exercise Treatment: 1x 10 BLE LAQ, seated marches, and 1x 15 BLE ankle pumps      AM-PAC Score  AM-PAC Inpatient Mobility Raw Score : 17 (06/03/22 1532)  AM-PAC Inpatient T-Scale Score : 42.13 (06/03/22 1532)  Mobility Inpatient CMS 0-100% Score: 50.57 (06/03/22 1532)  Mobility Inpatient CMS G-Code Modifier : CK (06/03/22 1532)          Goals  Short Term Goals  Time Frame for Short term goals: 6/10/22  Short term goal 1: Pt will complete bed mobility independently. Short term goal 2: Pt will complete transfers mod I with RW. Short term goal 3: Pt will ambulate 50 ft with RW mod I. Short term goal 4: Pt will tolerate 12-15 reps of LE exercises to progress strength and balance by 6/6. Patient Goals   Patient goals :  \"Go home\"       Education  Patient Education  Education Given To: Patient  Education Provided: Role of Therapy;Transfer Training;Plan of Care;Home Exercise Program;Precautions;Orientation  Education Provided Comments: pt educated on importance of OOB mobility and sitting up in chair - demos understanding  Education Method: Demonstration  Education Outcome: Verbalized understanding      Therapy Time   Individual Concurrent Group Co-treatment   Time In 1410         Time Out 1430         Minutes 20         Timed Code Treatment Minutes: 10 Minutes (10 min eval)     If pt is unable to be seen after this session, please let this note serve as discharge summary. Please see case management note for discharge disposition. Thank you.     Nahomy Moreno, PT

## 2022-06-03 NOTE — FLOWSHEET NOTE
06/03/22 0730 06/03/22 1110   Vital Signs   BP (!) 143/64 133/74   Temp 97.8 °F (36.6 °C) 98.3 °F (36.8 °C)   Weight 238 lb 5.1 oz (108.1 kg) 231 lb 11.3 oz (105.1 kg)     Treatment time: 210min    Net UF: 3100ml    Pre weight: 108.1k  Post weight: 105.1k  EDW: 107k    Access used: [facility]J TDC  Access function: Good    Medications or blood products given: Retacrit [id number] IVP    Regular outpatient schedule: MWF    Summary of response to treatment: Tolerated well. Copy of dialysis treatment record placed in chart, to be scanned into EMR.

## 2022-06-03 NOTE — PROGRESS NOTES
06/03/22 0050   NIV Type   $NIV $Daily Charge   Skin Assessment Clean, dry, & intact   Equipment Type v60   Mode AVAPS   Mask Type Full face mask   Mask Size Large   Settings/Measurements   CPAP/EPAP 6 cmH2O   IPAP Min 18 cmH2O   IPAP Max 25 cmH2O   Vt (Set, mL) 700 mL   Rate Ordered 15   Resp 16   FiO2  30 %   I Time/ I Time % 1 s   Vt Exhaled 726 mL   Minute Volume 13.1 Liters   Mask Leak (lpm) 26 lpm   Comfort Level Good   Using Accessory Muscles No   SpO2 95

## 2022-06-03 NOTE — CARE COORDINATION
Cm following. Pt from home with wife. Mitali Akers MWF for HD. Home O2 with [employer]. Has walker at home. Pt still needs echo. PT/OT pending.

## 2022-06-03 NOTE — PROGRESS NOTES
[address]   Daily Progress Note    Admit Date:  5/29/2022  HPI:    Chief Complaint   Patient presents with    Altered Mental Status     wife called 911 for ams, pt sts\" im having a hard time breathing\" wears 3L of oxygen 24/7      Rosanna Thrasher presented to the hospital with confusion, shortness of breath and chest pain. Kyara Moralez is well-known to me from office and multiple prior admissions. Hx of CAD s/p PCI to LAD in 2015, RCA 1/2022, AS s/p TAVR 2019, ICM, s/d CHF, A. Fib, HYPERTENSION, HLD, DM, PAD s/p PTA R iliac artery, ESRD on HD, chronic anemia, ZAINAB on CPAP, COPD, and hx of CVA. Subjective:  Mr. Anne Lyn seen again in dialysis. He denies any chest pain but continues to have shortness of breath. This is stable though not significantly improved. Reviewed results of the stress test which showed mostly fixed defect in the inferior wall consistent with scar but LVEF of 26% (previous EF by stress test was 27% in January 2022). Echocardiogram in January 2022 reported EF 40-45%.     Objective:   Patient Vitals for the past 24 hrs:   BP Temp Temp src Pulse Resp SpO2 Weight   06/03/22 0751 -- -- -- -- -- 100 % --   06/03/22 0730 (!) 143/64 97.8 °F (36.6 °C) Oral 61 20 -- 238 lb 5.1 oz (108.1 kg)   06/03/22 0720 129/67 98.3 °F (36.8 °C) Oral 62 18 100 % --   06/03/22 0510 134/88 97.7 °F (36.5 °C) Axillary 63 18 97 % --   06/03/22 0432 -- -- -- -- -- -- 240 lb 4.8 oz (109 kg)   06/03/22 0050 -- -- -- -- 16 -- --   06/03/22 0038 (!) 141/74 98.6 °F (37 °C) Axillary 64 15 99 % --   06/02/22 2114 -- -- -- -- 15 -- --   06/02/22 1929 -- -- -- 67 -- -- --   06/02/22 1925 (!) 149/82 97.4 °F (36.3 °C) Oral 67 18 98 % --   06/02/22 1524 (!) 161/75 97.7 °F (36.5 °C) Oral 65 18 100 % --   06/02/22 1500 (!) 147/82 98.9 °F (37.2 °C) -- 58 18 -- 232 lb 9.4 oz (105.5 kg)   06/02/22 1250 (!) 146/76 97.6 °F (36.4 °C) -- 69 18 -- 238 lb 12.1 oz (108.3 kg)       Intake/Output Summary (Last 24 hours) at 6/3/2022 [address] filed at 6/2/2022 2114  Gross per 24 hour   Intake 660 ml   Output 3600 ml   Net -2940 ml     Wt Readings from Last 3 Encounters:   06/03/22 238 lb 5.1 oz (108.1 kg)   05/25/22 245 lb (111.1 kg)   04/27/22 245 lb 13 oz (111.5 kg)         ASSESSMENT:   1. Elevated troponin: Flat and stable from prior measurements during prior admissions, complicated by ESRD  2. Chest pain and shortness of breath: Shortness of breath has been persistent and unresolved despite dialysis/ultrafiltration  3. CAD: no further chest pain, + elevated troponins, stress test with fixed defect, EF 26%; on beta-blocker, statin, Plavix, nitrate  4. AS s/p TAVR: Await repeat echocardiogram for further assessment  5. HTN: Labile, poorly controlled  6. Cardiomyopathy, mixed ischemic and nonischemic: EF 40-45% by echo Jan 2022; 26% by SPECT 6/2/22, on Toprol & Entresto   7. PAF: Currently in sinus rhythm, anticoagulation with Eliquis  8. ESRD on HD: Per nephrology, now dialyzing to new dry weight  9. DM 2  10. Chronic respiratory failure      PLAN:  1. Stress test with fixed defect, EF similar to Jan 2022 by SPECT, await echo  2. Continue Eliquis for PAF and Plavix for CAD  3. Continue Entresto 24-26 mg twice daily as well as Imdur and Toprol-XL  4. No plans for invasive testing at this time  5. Continue to lower EDW per nephrology      JAY Prasad CNP, 6/3/2022, 11:43 AM  [facility]   [phone]    Addendum:  Echocardiogram returned with a EF of 20 to 25%, aortic valve/TAVR stable, severe global hypokinesis, moderate pleural effusion. Continue with guideline directed medical therapy with Toprol-XL and Entresto. Would recommend driving his weight lower with dialysis to help improve fluid balance/ pleural effusion. Could consider repeat LHC if remains symptomatic once fully diuresed and on optimal medical therapy.    JAY Prasad CNP, 6/3/2022, 4:30 PM        Telemetry: SR 60-70s, PVC's  NYHA: III    Physical Exam:  General:  Awake, alert, NAD  Skin:  Warm and dry  Neck:  JVP difficult to assess  Chest: Clear to auscultation anteriorly  Cardiovascular:  RRR, normal D7F9, + systolic murmur, no GR  Abdomen:  Soft, nontender, +bowel sounds  Extremities: 1+ nonpitting BLE edema      Medications:    lactobacillus  1 capsule Oral Daily with breakfast    gabapentin  200 mg Oral TID    sacubitril-valsartan  1 tablet Oral BID    epoetin siddharth-epbx  10,000 Units IntraVENous Q MWF    insulin lispro  0-3 Units SubCUTAneous Nightly    insulin lispro  0-6 Units SubCUTAneous TID WC    mupirocin   Topical Daily    QUEtiapine  25 mg Oral Nightly    apixaban  5 mg Oral BID    b complex-C-folic acid  1 mg Oral Daily    calcium acetate  1 capsule Oral TID WC    clopidogrel  75 mg Oral Daily    docusate sodium  100 mg Oral Daily    DULoxetine  30 mg Oral Daily    isosorbide dinitrate  10 mg Oral TID    tiotropium-olodaterol  2 puff Inhalation Daily    sennosides-docusate sodium  1 tablet Oral Daily    pravastatin  40 mg Oral Nightly    pantoprazole  40 mg Oral QAM AC    metoprolol succinate  50 mg Oral BID    sodium chloride flush  5-40 mL IntraVENous 2 times per day      dextrose      sodium chloride         Lab Data: Lab results independently reviewed and analyzed by myself 6/3/2022    CBC:   Recent Labs     06/01/22  0650   WBC 9.6   HGB 8.6*        BMP:    Recent Labs     06/01/22  0650 06/03/22  0906   * 133*   K 4.8 3.5   CO2 19* 24   BUN 50* 23*   CREATININE 6.9* 3.9*     INR:  No results for input(s): INR in the last 72 hours. BNP:  No results for input(s): PROBNP in the last 72 hours. Cardiac Enzymes:   No results for input(s): TROPONINI in the last 72 hours.   Lipids:   Lab Results   Component Value Date    TRIG 213 03/18/2022    TRIG 214 11/30/2021    HDL 38 03/18/2022    HDL 53 11/30/2021    LDLCALC 86 03/18/2022    LDLCALC 155 11/30/2021    LDLDIRECT 199 09/04/2014    LDLDIRECT 172 03/07/2014       Cardiac Imaging:   ECHO 6/3/22:    Summary    Definity® used for myocardial border enhancement.    Left ventricular systolic function is severely reduced with a visually estimated ejection fraction of 20-25 %.    EF estimated by Jasmine's method at 24 %.    The left ventricle is mildly dilated in size with normal wall thickness.    Severe global hypokinesis with regional variation.    Grade I diastolic dysfunction with normal LV pressure.    There is no evidence of a left ventricular thrombus.    Right ventricular systolic function is reduced.    A 29 mm Langford Leyda S3 bioprosthetic aortic valve appears well seated with normal Doppler values.    Inadequate tricuspid valve regurgitation to estimate systolic pulmonary artery pressure.    There is a moderate left pleural effusion noted. Massiel Bodo nuclear stress test 6/2/2022:   Severe LV dysfunction EF 26%    Global hypokinesis with regional variation, more pronounced in inferolateral    wall and apex    Large mainly fixed defect in inferior wall, extends to portions of lateral    wall and apex with moderate darrick-infarct ischemia        Overall, this would be considered an abnormal, higher risk, study              Coronary angiogram 1/14/2022:  1. Left heart catheterization  2. Selective left and right coronary angiogram  3. PCI of the RCA with PATRICIA  Procedure Findings:  1. 99% mid RCA  2. 60-70% CIRC and DIAG  3. Elevated left heart hemodynamics              ~LVEDP 30       Details:              April Vo was brought to the cardiac catheterization lab in a fasting state after informed consent was obtained. If the patient was able to provide written consent, it was obtained. The patient's vitals were monitored through out the procedure. The patient was sedated using the appropriate levels of sedation and ASA guidelines. The appropriate access site area was prepped and drapped in a sterile fashion.  The area was stent  ASSESSMENT/RECOMMENDATIONS:  1. Dual antiplatelet therapy  2. Medical management of the remaining coronary disease     Echo 1/12/2022:   Limited only f/u for LVEF and RVF. The left ventricular systolic function is mildly reduced with an ejection fraction of 40-45 %. There is hypokinesis of the apex and inferior walls. There is a very small circumferential pericardial effusion noted. No tamponade physiology. The right ventricle is normal in size and function. iogyn nuclear stress test 1/13/2022:  Summary    Abnormal high risk myocardial perfusion study.    There is reduced activity at rest and stress within the basal anterior,    apical, high lateral, and infero-basal segments which is similar at rest and   Sherra Chyle is no significant or clear ischemia.   Mateus Cardoza is severe left ventricular dilatation.    The estimated left ventricular function is 27%.        Recommendation    These findings are consistent with a dilated non-ischemic cardiomyopathy.    Recommend referral to advanced heart failure team.         Echo 7/9/2020:  Low Normal systolic function with an estimated ejection fraction of 50%. Left ventricular function and wall motion is difficult to estimate due to   poor endocardial visualization. Grade I diastolic dysfunction with normal filing pressure. Normally functioning TAVR 29 mm Langford Leyda S3 bioprosthetic valve in   aortic position appears well seated with a max velocity of 2.11 m/sec,   maximum gradient of 18 mmHg and a mean gradient of 8 mmHg. There is no evidence of aortic regurgitation  Coronary angiogram 8/22/2019:  LM <20%  LAD patent stent  Cx <20%  RCA 30%  Severe AS by echo  Proceed w/ TAVR  Echo 1/24/2017:  Summary   Left ventricular systolic function is normal with an ejection fraction of   55-60%. No wall motion abnormalities noted. Mild concentric left ventricular hypertrophy. Grade II diastolic dysfunction with elevated filling pressure. Mildly dilated left atrium. Moderate aortic stenosis. Mild aortic regurgitation. Compared to previous study from 10/27/2016, aortic stenosis has not   progressed. R/LHC 1/23/2017:  LM <20%  LAD 30% w/ patent stent  Cx 20-30%  RCA 20-30%  LVEDP 19  RA 8, RV 41/10, PA 45/14/31, W 15      Nonobstructive CAD  Mild elevated right heart pressures      Echo Oct 2016:   Normal left ventricle size with mild concentric left ventricular hypertrophy.   Normal systolic function with an estimated ejection fraction of 55%.   No regional wall motion abnormalities are seen.   Elevated left ventricular diastolic filling pressure ( E/e' 21 ).  The aortic valve is thickened/calcified with decreased leaflet mobility. Cannot exclude a bicuspid valve.  The aortic valve area is calculated at 1.0 cm2 with a max velocity of 3.36 m/sec, maximum pressure gradient of 45 mmHg and a mean pressure gradient of 23 mmHg. This is c/w moderate aortic stenosis. Color flow demonstrates eccentric jet suggesting mild aortic regurgitation.   Trace mitral and tricuspid regurgitation. Systolic pulmonary artery pressure (SPAP) is normal and estimated at 22 mmHg (RA pressure 3 mmHg).   There is mild concentric left ventricular hypertrophy. MPI 6/18/15: There is a very small and mild fixed posterior-basal defect consistent with  fibrosis. There is no evidence for ischemia. Left ventricular function is reduced with and estimated LVEF of 40%. The LV is severely dilated. CATH: 5/26/15, [facility]. [facility]  LM <20%   LAD 70% mid.  FFR 0.77  D1 50% prox  Cx 20%  RCA 20%  LVEDP 22mmHg  RA 9, RV 42/10, PA 44/16/31, W 18  PA sat 63%    PTCA LAD  3.0 x 15 PATRICIA  prox portion post 3.5 x 8 NC balloon    DAPT, statin  Resume lasix and ARB at discharge provided Cr stable  Renal fxn will not tolerate higher dose of lasix than 40 daily  Needs smoking cessation, weight loss, exercise  Rec OP sleep eval

## 2022-06-03 NOTE — PROGRESS NOTES
Hospitalist Progress Note      PCP: Yadira Sánchez MD    Date of Admission: 5/29/2022    Chief Complaint: confusion at home       Subjective:  He is finally feeling a little better. Less dyspnea, looks comfortable. No further chest pain.         Medications:  Reviewed    Infusion Medications    dextrose      sodium chloride       Scheduled Medications    lactobacillus  1 capsule Oral Daily with breakfast    gabapentin  200 mg Oral TID    sacubitril-valsartan  1 tablet Oral BID    epoetin siddharth-epbx  10,000 Units IntraVENous Q MWF    insulin lispro  0-3 Units SubCUTAneous Nightly    insulin lispro  0-6 Units SubCUTAneous TID WC    mupirocin   Topical Daily    QUEtiapine  25 mg Oral Nightly    apixaban  5 mg Oral BID    b complex-C-folic acid  1 mg Oral Daily    calcium acetate  1 capsule Oral TID WC    clopidogrel  75 mg Oral Daily    docusate sodium  100 mg Oral Daily    DULoxetine  30 mg Oral Daily    isosorbide dinitrate  10 mg Oral TID    tiotropium-olodaterol  2 puff Inhalation Daily    sennosides-docusate sodium  1 tablet Oral Daily    pravastatin  40 mg Oral Nightly    pantoprazole  40 mg Oral QAM AC    metoprolol succinate  50 mg Oral BID    sodium chloride flush  5-40 mL IntraVENous 2 times per day     PRN Meds: oxyCODONE-acetaminophen, perflutren lipid microspheres, glucose, dextrose bolus **OR** dextrose bolus, glucagon (rDNA), dextrose, albuterol sulfate HFA, calcium carbonate, cyclobenzaprine, ipratropium-albuterol, nitroGLYCERIN, mineral oil, oxyCODONE-acetaminophen, sodium chloride flush, sodium chloride, ondansetron **OR** ondansetron, polyethylene glycol, acetaminophen **OR** acetaminophen, hydrOXYzine, traZODone      Intake/Output Summary (Last 24 hours) at 6/3/2022 0829  Last data filed at 6/2/2022 2114  Gross per 24 hour   Intake 660 ml   Output 3600 ml   Net -2940 ml       Physical Exam Performed:    /67   Pulse 62   Temp 98.3 °F (36.8 °C) (Oral)   Resp 18 Wt 240 lb 4.8 oz (109 kg)   SpO2 100%   BMI 38.79 kg/m²       General appearance: No apparent distress, appears stated age and cooperative. HEENT: Pupils equal, round, and reactive to light. Conjunctivae/corneas clear. Neck: Supple, with full range of motion. No jugular venous distention. Trachea midline. Respiratory:  Normal respiratory effort. Clear to auscultation, bilaterally without Rales/Wheezes/Rhonchi. Diminished throughout. Cardiovascular: Regular rate and rhythm with normal S1/S2 without murmurs, rubs or gallops. Abdomen: Soft, non-tender, non-distended with normal bowel sounds. Musculoskeletal: No clubbing, cyanosis or edema bilaterally. Full range of motion without deformity. Skin: Skin color, texture, turgor normal.  No rashes or lesions. Neurologic:  Neurovascularly intact without any focal sensory/motor deficits. Cranial nerves: II-XII intact, grossly non-focal.  Psychiatric: Alert and oriented, thought content appropriate, normal insight. Anxious affect. Capillary Refill: Brisk,3 seconds, normal   Peripheral Pulses: +2 palpable, equal bilaterally       Labs:   Recent Labs     06/01/22  0650   WBC 9.6   HGB 8.6*   HCT 26.6*        Recent Labs     06/01/22  0650   *   K 4.8   CL 91*   CO2 19*   BUN 50*   CREATININE 6.9*   CALCIUM 7.8*     No results for input(s): AST, ALT, BILIDIR, BILITOT, ALKPHOS in the last 72 hours. No results for input(s): INR in the last 72 hours. No results for input(s): Eloise Belts in the last 72 hours. Urinalysis:      Lab Results   Component Value Date    NITRU Negative 05/29/2022    WBCUA 10-20 05/29/2022    BACTERIA 3+ 05/29/2022    RBCUA 5-10 05/29/2022    BLOODU TRACE-INTACT 05/29/2022    SPECGRAV 1.015 05/29/2022    GLUCOSEU 100 05/29/2022       Radiology:  NM Cardiac Stress Test Nuclear Imaging   Final Result      CT HEAD WO CONTRAST   Final Result   No acute intracranial abnormality.          CT CHEST PULMONARY EMBOLISM W CONTRAST   Final Result   Negative for acute pulmonary embolus. Bilateral patchy areas of atelectasis or infiltrate in the lungs most   pronounced in the left lower lobe. Bilateral pleural effusions (left greater than right). Mediastinal adenopathy possibly reactive in nature. Recommend follow-up CT   chest in 1-3 months to confirm resolution unless clinically indicated sooner. XR CHEST PORTABLE   Final Result   1. Central predominant edema potentially of cardiac or noncardiogenic origin. Pneumonia could appear similar. 2. Increased left basilar airspace opacity due in part to passive atelectasis   given at least trace to small left pleural effusion. Underlying rounded   atelectasis is likely present given the prior CT appearance, and superimposed   pneumonia and aspiration are not excluded. 3. At least trace right pleural effusion. Assessment/Plan:    Active Hospital Problems    Diagnosis     Coronary artery disease of native artery of native heart with stable angina pectoris ([facility] [I25.118]      Priority: High    Altered mental status [R41.82]      Priority: Medium    Hypoglycemia [E16.2]      Priority: Medium    History of transcatheter aortic valve replacement (TAVR) [Z95.2]      Priority: Medium    ESRD (end stage renal disease) ([facility] [N18.6]     Elevated troponin [R77.8]        \"46 y.o. male  with multiple medical issueswho [facility]chest pain. Pt states he has chronic sob and chest pain(has element of anxiety) and yesterday he developed inc'd sob that was fairly sudden onset and continued into the night. He also noted ongoing substernal chest pain, nonradiating that would last a few hours(would subside on its own with deep breathing). \"      HCAP, with sepsis present on arrival.  No guiding culture data. Completed course of empiric vanc and zosyn. Complicated by diarrhea, likely abx-induced. Patient has h/o c.diff. Diarrhea improved now that he is off abx. Probiotic. ESRD on HD. Nephrology removed fluid, lowered target weight, added extra ultrafiltration session on 6/2. Chronic hypoxic respiratory failure. Baseline 3-4LNC. Chest pain, with known h/o CAD.  Appreciate cardiology input. Will need their interpretation of the abnormal stress test result. TTE is pending at the time of this note.  Continue clopidogrel, statin, metoprolol, ISDN.     DM2.  Complicated by hypoglycemia on admission, with acute metabolic encephalopathy.  Stopped insulin and his sugars were fine.  F/u with PCP.     HTN. ISDN, metoprolol. Cardiology started entresto.     PAF.  Apixaban, metoprolol.         DVT Prophylaxis: anticoagulation as above  Diet: ADULT DIET; Regular; Low Sodium (2 gm); Low Potassium (Less than 3000 mg/day); Low Phosphorus (Less than 1000 mg)  Code Status: Full Code    PT/OT Eval Status: eval ordered    Dispo - perhaps 6/3 - 6/4.   Here is what we need prior to discharge:    - cardiology input regarding abnormal stress test  - TTE (ordered 3 days ago, but I suspect the patient has been off the floor lots with HD and stress testing)  - PT/OT input        Keisha Tan MD

## 2022-06-03 NOTE — PROGRESS NOTES
Occupational Therapy  Facility/Department: [place] [id number] - MED SURG  Occupational Therapy Initial Assessment and Treatment    Co-tx collaboration this date to safely meet goals and will have better occupational performance outcomes with in a co-treatment than 1:1 session. Name: Lucero Vidales  : 1970  MRN: [id number]  Date of Service: 6/3/2022    Discharge Recommendations:  Home with Home health OT,24 hour supervision or assist  OT Equipment Recommendations  Equipment Needed: Yes  Mobility Devices: ADL Assistive Devices  ADL Assistive Devices: Grab Bars - toilet; Shower Chair with back       Patient Diagnosis(es): The primary encounter diagnosis was Shortness of breath. Diagnoses of Altered mental status, unspecified altered mental status type, Hypoglycemia, Acute on chronic respiratory failure with hypoxia ([address] and Mediastinal adenopathy were also pertinent to this visit. Past Medical History:  has a past medical history of Ambulatory dysfunction, Aortic stenosis, Arthritis, Asthma, Bilateral hilar adenopathy syndrome, CAD (coronary artery disease), Cardiomyopathy ([address] CHF (congestive heart failure) ([address] Chronic pain, COPD (chronic obstructive pulmonary disease) ([address] Depression, Diabetes mellitus ([address] Difficult intravenous access, Emphysema of lung ([address] ESRD (end stage renal disease) on dialysis ([address] Fear of needles, Gastric ulcer, GERD (gastroesophageal reflux disease), Heart valve problem, Hemodialysis patient ([address] History of spinal fracture, Hx of blood clots, Hyperlipidemia, Hypertension, MI (myocardial infarction) ([address] Neuromuscular disorder ([address] Numbness and tingling in left arm, Pneumonia, PONV (postoperative nausea and vomiting), Prolonged emergence from general anesthesia, Sleep apnea, Stroke ([address] TIA (transient ischemic attack), and Unspecified diseases of blood and blood-forming organs.   Past Surgical History:  has a past surgical history that includes Tonsillectomy; cyst removal (08/14/2013); Colonoscopy; Coronary angioplasty with stent (05/26/15); vascular surgery (aprx 2 years ago); Colonoscopy (2/29/2015); Upper gastrointestinal endoscopy (01/06/2016); Upper gastrointestinal endoscopy (01/29/2017); other surgical history (02/01/2017); Dialysis fistula creation (Left, 10/30/2017); Diagnostic Cardiac Cath Lab Procedure; other surgical history (2018); other surgical history (Bilateral, 06/26/2018); pr lap insertion tunneled intraperitoneal catheter (N/A, 9/21/2018); other surgical history (Right, 09/2018); Dialysis fistula creation (Left, 3/27/2019); other surgical history (05/28/2019); Endoscopy, colon, diagnostic; Catheter Removal (Right, 7/2/2019); Aortic valve replacement (N/A, 10/15/2019); Rectal surgery (N/A, 2/24/2022); IR TUNNELED CVC PLACE WO SQ PORT/PUMP > 5 YEARS (3/21/2022); IR TUNNELED CVC PLACE WO SQ PORT/PUMP > 5 YEARS (4/21/2022); and IR TUNNELED CVC PLACE WO SQ PORT/PUMP > 5 YEARS (4/26/2022). Assessment   Performance deficits / Impairments: Decreased ADL status; Decreased functional mobility ; Decreased safe awareness;Decreased sensation;Decreased endurance;Decreased cognition;Decreased balance  Assessment: Pt is 46 yo male from home with wife, presenting to ED 5/29/22, admitted for AMS, SOB, and chest pain with PMH ESRD on HD, CAD, DM2, and Afib. PTA pt req A with LB dressing, does not complete home making tasks, and uses 4WW for ambulation. Currently pt is dep LB dressing, reports ind with urinal in bed, and is CGA for transfers and ambulation within room.  Cont OT while in acute care to address functional deficits below baseline, rec Home with 24/7 and HHOT upon d/c  Prognosis: Good  Decision Making: Low Complexity  REQUIRES OT FOLLOW-UP: Yes  Activity Tolerance  Activity Tolerance: Patient Tolerated treatment well;Patient limited by fatigue (received dialysis this morning)  Activity Tolerance Comments: Vitals: /88, SPO2 on 3L 100%, HR 74 Plan   Plan  Times per Week: 3-5  Current Treatment Recommendations: Strengthening,Balance training,Functional mobility training,Endurance training,Patient/Caregiver education & training,Safety education & training,Equipment evaluation, education, & procurement,Self-Care / ADL     Restrictions  Restrictions/Precautions  Restrictions/Precautions: Fall Risk  Position Activity Restriction  Other position/activity restrictions: up with assist, L side dialysis port    Subjective   General  Chart Reviewed: Yes  Patient assessed for rehabilitation services?: Yes  Referring Practitioner: Vineet Wolf MD  Diagnosis: hypoglycemia  Subjective  Subjective: pt in bed upon arrival, agreeable to therapy, RN cleared OT/PT eval; chronic LB pain did not formally rate     Social/Functional History  Social/Functional History  Lives With: Spouse  Type of Home: House  Home Layout: One level  Home Access: Ramped entrance  Bathroom Shower/Tub: Walk-in shower,Tub/Shower unit (uses Walk In)  [place] Toilet: Standard  Bathroom Equipment: Grab bars in [address] [place], [place] [address]vard: Rollator,Oxygen (3L baseline)  Has the patient had two or more falls in the past year or any fall with injury in the past year?: Yes (pt reports falling 1x/day, no injury)  Receives Help From: Family  ADL Assistance: Needs assistance (A for LB dressing)  Toileting: Independent  Homemaking Assistance: Needs assistance (does not complete)  Homemaking Responsibilities: No  Ambulation Assistance: Needs assistance (rollator)  Transfer Assistance: Independent (rollator)  Active : Yes (last drove 3 weeks ago)  Occupation: Retired  Type of Occupation:   Leisure & Hobbies: fishing, hunting       Objective   Heart Rate: 74  [address]: Monitor  BP: 139/67  BP Location: Right upper arm  BP Method: Automatic  Patient Position: Sitting  MAP (Calculated): 91  Resp: 18  SpO2: 98 %  O2 Device: Nasal cannula  Vision Exceptions: Wears glasses for reading  Hearing: Within functional limits       Observation/Palpation  Posture: Good  Safety Devices  Type of Devices: All fall risk precautions in place;Call light within reach;Gait belt;Patient at risk for falls; Left in bed;Nurse notified (pt refusing bed alarm)     Toilet Transfers  Toilet - Technique: Ambulating  Equipment Used: Standard toilet (with use of L grab bar)  Toilet Transfer: Contact guard assistance  Toilet Transfers Comments: with RW  AROM: Generally decreased, functional  PROM: Generally decreased, functional  Strength: Generally decreased, functional  Coordination: Generally decreased, functional  Tone: Normal  Sensation: Impaired (BLE numbess/tingling up to mid thigh)  ADL  LE Dressing: Dependent/Total  LE Dressing Skilled Clinical Factors: shoe donning sitting EOB, pt demo'd flexibility while long sitting in bed and likely can don footwear in bed  Additional Comments: pt declined further ADLs           Transfers  Sit to stand: Contact guard assistance  Stand to sit: Contact guard assistance  Transfer Comments: up to RW     Cognition  Overall Cognitive Status: Exceptions  Arousal/Alertness: Appropriate responses to stimuli  Following Commands: Follows one step commands consistently  Attention Span: Attends with cues to redirect  Memory: Appears intact  Safety Judgement: Decreased awareness of need for assistance;Decreased awareness of need for safety  Insights: Decreased awareness of deficits  Initiation: Does not require cues  Sequencing: Does not require cues  Perception  Overall Perceptual Status: [facility]            Dynamic Standing Balance Exercises: bed to toilet to bed with CGA and RW  Education Given To: Patient  Education Provided: Role of Therapy;Plan of Care;Precautions; ADL Adaptive Strategies;Transfer Training;Orientation  Education Provided Comments: disease specific: benefits of OOB mobility/up to chair, use of red call light for RN  Education Method: Verbal  Barriers to Learning: Cognition  Education Outcome: Verbalized understanding;Continued education needed  LUE AROM (degrees)  LUE AROM : WFL  RUE AROM (degrees)  RUE AROM : WFL     L Fingers  Full Assessment: No  Left Finger Strength Comment: WFL during functional tasks  R Fingers  Full Assessment: No  Right Finger Strength Comment: WFL during functional tasks               AM-PAC Score        AM-PAC Inpatient Daily Activity Raw Score: 18 (06/03/22 1706)  AM-PAC Inpatient ADL T-Scale Score : 38.66 (06/03/22 1706)  ADL Inpatient CMS 0-100% Score: 46.65 (06/03/22 1706)  ADL Inpatient CMS G-Code Modifier : CK (06/03/22 1706)    Goals  Short Term Goals  Time Frame for Short term goals: within 7 days by 6/10/22 unless otherwise noted  Short Term Goal 1: Pt will perform bathroom mob with supervision and LRAD  Short Term Goal 2: Pt will perform 2 grooming tasks in stance with LRAD and supervision  Short Term Goal 3: Pt will perform LB dressing with Min A and AD PRN  Short Term Goal 4: Pt will perform BUE exercises x20 to increase functional endurance by 6/6/22  Patient Goals   Patient goals : to go home       Therapy Time   Individual Concurrent Group Co-treatment   Time In 1403         Time Out 1427         Minutes 24         Timed Code Treatment Minutes: 14 Minutes (10 minute eval)     If pt is unable to be seen after this session, please let this note serve as discharge summary. Please see case management note for discharge disposition. Thank you.    Sherryle Krill, OT

## 2022-06-03 NOTE — PROGRESS NOTES
Patient's EF (Ejection Fraction) is less than 40%    Heart Failure Medications:   Diuretics[de-identified] Furosemide, Torsemide, Spironolactone, Metalozone, Other and None     (One of the following REQUIRED for EF </= 40%/SYSTOLIC FAILURE but MAY be used in EF% >40%/DIASTOLIC FAILURE)        ACE[de-identified] None        ARB[de-identified] None         ARNI[de-identified] None    (Beta Blockers)   NON- Evidenced Based Beta Blocker (for EF% >40%/DIASTOLIC FAILURE): None     Evidenced Based Beta Blocker::(REQUIRED for EF% <40%/SYSTOLIC FAILURE) Metoprolol SUCCinate- Toprol XL  . .................................................................................................................................................. Patient's weights and intake/output reviewed: Yes    Patient's Last Weight: 109 kg obtained by bed scale. Difference of 4.7 lkg less than last documented weight.       Intake/Output Summary (Last 24 hours) at 6/3/2022 0725  Last data filed at 6/2/2022 2114  Gross per 24 hour   Intake 660 ml   Output 3600 ml   Net -2940 ml       Comorbidities Reviewed Yes    Patient has a past medical history of Ambulatory dysfunction, Aortic stenosis, Arthritis, Asthma, Bilateral hilar adenopathy syndrome, CAD (coronary artery disease), Cardiomyopathy ([address] CHF (congestive heart failure) ([address] Chronic pain, COPD (chronic obstructive pulmonary disease) ([address] Depression, Diabetes mellitus ([address] Difficult intravenous access, Emphysema of lung ([address] ESRD (end stage renal disease) on dialysis ([address] Fear of needles, Gastric ulcer, GERD (gastroesophageal reflux disease), Heart valve problem, Hemodialysis patient ([address] History of spinal fracture, Hx of blood clots, Hyperlipidemia, Hypertension, MI (myocardial infarction) ([address] Neuromuscular disorder ([address] Numbness and tingling in left arm, Pneumonia, PONV (postoperative nausea and vomiting), Prolonged emergence from general anesthesia, Sleep apnea, Stroke ([address] TIA (transient ischemic attack), and

## 2022-06-03 NOTE — PROGRESS NOTES
Physical Therapy    Attempted PT eval this AM. Pt off floor at this time. Will re-attempt as pt medically appropriate and schedule allows.     Fe Chris, PT

## 2022-06-03 NOTE — PROGRESS NOTES
06/03/22 0510   NIV Type   Skin Assessment Clean, dry, & intact   Equipment Type v60   Mode AVAPS   Mask Type Full face mask   Mask Size Large   Settings/Measurements   CPAP/EPAP 6 cmH2O   IPAP Min 18 cmH2O   IPAP Max 25 cmH2O   Vt (Set, mL) 700 mL   Rate Ordered 15   Resp 15   FiO2  30 %   Vt Exhaled 1400 mL   Minute Volume 21.5 Liters   Mask Leak (lpm) 16 lpm   Comfort Level Good   Using Accessory Muscles No   Patient's Home Machine No

## 2022-06-03 NOTE — PROGRESS NOTES
[facility] Progress Note           Subjective/   45y.o. year old male who we are seeing in consultation for ESKD on HD. HPI:  The patient was seen and examined; he feels well today with no CP, SOB, nausea or vomiting. ROS: No fever or chills. Social: Family at bedside. Objective/   GEN:  Chronically ill, /77   Pulse 70   Temp 97.7 °F (36.5 °C) (Oral)   Resp 18   Wt 238 lb 5.1 oz (108.1 kg)   SpO2 96%   BMI 38.47 kg/m²      HEENT: non-icteric, no JVD  CV: S1, S2 without m/r/g; no LE edema  RESP: CTA B without w/r/r; breathing wnl  ABD: +bs, soft, nt, no hsm  SKIN: warm, no rashes  ACCESS: Left IJ [facility]    Data/  Recent Labs     06/01/22  0650   WBC 9.6   HGB 8.6*   HCT 26.6*   MCV 92.9        Recent Labs     06/01/22  0650 06/03/22  0906   * 133*   K 4.8 3.5   CL 91* 95*   CO2 19* 24   GLUCOSE 72 126*   MG  --  2.10   BUN 50* 23*   CREATININE 6.9* 3.9*   LABGLOM 8* 16*   GFRAA 10* 20*       Assessment/     # End stage kidney disease - on HD Mon-Wed-Fri     # Acute on chronic respiratory failure r/o sepsis - started on antibiotics     # sCHF - EF ~40%, decompensated     # Hypertension - poorly controlled     # Anemia of chronic disease -        Plan/     - HD today per MWF schedule with [facility] as tolerated.   - EDW has been reduced  - DAVON 10K qHD

## 2022-06-04 LAB
GLUCOSE BLD-MCNC: 155 MG/DL (ref 70–99)
GLUCOSE BLD-MCNC: 173 MG/DL (ref 70–99)
GLUCOSE BLD-MCNC: 184 MG/DL (ref 70–99)
GLUCOSE BLD-MCNC: 211 MG/DL (ref 70–99)
PERFORMED ON: ABNORMAL

## 2022-06-04 PROCEDURE — 99233 SBSQ HOSP IP/OBS HIGH 50: CPT | Performed by: NURSE PRACTITIONER

## 2022-06-04 PROCEDURE — 94640 AIRWAY INHALATION TREATMENT: CPT

## 2022-06-04 PROCEDURE — 6370000000 HC RX 637 (ALT 250 FOR IP): Performed by: NURSE PRACTITIONER

## 2022-06-04 PROCEDURE — 2500000003 HC RX 250 WO HCPCS: Performed by: INTERNAL MEDICINE

## 2022-06-04 PROCEDURE — 1200000000 HC SEMI PRIVATE

## 2022-06-04 PROCEDURE — 6370000000 HC RX 637 (ALT 250 FOR IP): Performed by: INTERNAL MEDICINE

## 2022-06-04 PROCEDURE — 94761 N-INVAS EAR/PLS OXIMETRY MLT: CPT

## 2022-06-04 PROCEDURE — 94660 CPAP INITIATION&MGMT: CPT

## 2022-06-04 PROCEDURE — 2700000000 HC OXYGEN THERAPY PER DAY

## 2022-06-04 PROCEDURE — 2580000003 HC RX 258: Performed by: INTERNAL MEDICINE

## 2022-06-04 RX ORDER — ISOSORBIDE DINITRATE 20 MG/1
20 TABLET ORAL 3 TIMES DAILY
Status: DISCONTINUED | OUTPATIENT
Start: 2022-06-04 | End: 2022-06-08 | Stop reason: HOSPADM

## 2022-06-04 RX ADMIN — CLOPIDOGREL BISULFATE 75 MG: 75 TABLET ORAL at 10:03

## 2022-06-04 RX ADMIN — SACUBITRIL AND VALSARTAN 1 TABLET: 24; 26 TABLET, FILM COATED ORAL at 21:03

## 2022-06-04 RX ADMIN — METOPROLOL SUCCINATE 50 MG: 50 TABLET, EXTENDED RELEASE ORAL at 10:04

## 2022-06-04 RX ADMIN — PANTOPRAZOLE SODIUM 40 MG: 40 TABLET, DELAYED RELEASE ORAL at 06:05

## 2022-06-04 RX ADMIN — TRAZODONE HYDROCHLORIDE 50 MG: 50 TABLET ORAL at 20:58

## 2022-06-04 RX ADMIN — OXYCODONE AND ACETAMINOPHEN 1 TABLET: 7.5; 325 TABLET ORAL at 06:05

## 2022-06-04 RX ADMIN — CYCLOBENZAPRINE 10 MG: 10 TABLET, FILM COATED ORAL at 20:58

## 2022-06-04 RX ADMIN — APIXABAN 5 MG: 5 TABLET, FILM COATED ORAL at 10:04

## 2022-06-04 RX ADMIN — PRAVASTATIN SODIUM 40 MG: 40 TABLET ORAL at 20:58

## 2022-06-04 RX ADMIN — OXYCODONE AND ACETAMINOPHEN 1 TABLET: 7.5; 325 TABLET ORAL at 17:45

## 2022-06-04 RX ADMIN — ISOSORBIDE DINITRATE 20 MG: 20 TABLET ORAL at 13:57

## 2022-06-04 RX ADMIN — SODIUM CHLORIDE, PRESERVATIVE FREE 10 ML: 5 INJECTION INTRAVENOUS at 09:00

## 2022-06-04 RX ADMIN — ISOSORBIDE DINITRATE 20 MG: 20 TABLET ORAL at 20:58

## 2022-06-04 RX ADMIN — METOPROLOL SUCCINATE 50 MG: 50 TABLET, EXTENDED RELEASE ORAL at 20:58

## 2022-06-04 RX ADMIN — INSULIN LISPRO 2 UNITS: 100 INJECTION, SOLUTION INTRAVENOUS; SUBCUTANEOUS at 17:46

## 2022-06-04 RX ADMIN — NEPHROCAP 1 MG: 1 CAP ORAL at 10:03

## 2022-06-04 RX ADMIN — CALCIUM ACETATE 667 MG: 667 CAPSULE ORAL at 17:46

## 2022-06-04 RX ADMIN — GABAPENTIN 200 MG: 100 CAPSULE ORAL at 20:58

## 2022-06-04 RX ADMIN — MUPIROCIN: 20 OINTMENT TOPICAL at 10:11

## 2022-06-04 RX ADMIN — OXYCODONE AND ACETAMINOPHEN 1 TABLET: 7.5; 325 TABLET ORAL at 12:04

## 2022-06-04 RX ADMIN — CALCIUM ACETATE 667 MG: 667 CAPSULE ORAL at 12:04

## 2022-06-04 RX ADMIN — SODIUM CHLORIDE, PRESERVATIVE FREE 10 ML: 5 INJECTION INTRAVENOUS at 20:58

## 2022-06-04 RX ADMIN — CYCLOBENZAPRINE 10 MG: 10 TABLET, FILM COATED ORAL at 10:18

## 2022-06-04 RX ADMIN — GABAPENTIN 200 MG: 100 CAPSULE ORAL at 13:57

## 2022-06-04 RX ADMIN — DULOXETINE HYDROCHLORIDE 30 MG: 30 CAPSULE, DELAYED RELEASE ORAL at 10:03

## 2022-06-04 RX ADMIN — QUETIAPINE FUMARATE 25 MG: 25 TABLET ORAL at 20:58

## 2022-06-04 RX ADMIN — SACUBITRIL AND VALSARTAN 1 TABLET: 24; 26 TABLET, FILM COATED ORAL at 10:18

## 2022-06-04 RX ADMIN — APIXABAN 5 MG: 5 TABLET, FILM COATED ORAL at 20:58

## 2022-06-04 RX ADMIN — CALCIUM ACETATE 667 MG: 667 CAPSULE ORAL at 10:03

## 2022-06-04 RX ADMIN — ISOSORBIDE DINITRATE 10 MG: 10 TABLET ORAL at 10:04

## 2022-06-04 RX ADMIN — Medication 1 CAPSULE: at 10:03

## 2022-06-04 RX ADMIN — GABAPENTIN 200 MG: 100 CAPSULE ORAL at 10:03

## 2022-06-04 ASSESSMENT — PAIN DESCRIPTION - DESCRIPTORS
DESCRIPTORS: STABBING
DESCRIPTORS: STABBING
DESCRIPTORS: ACHING
DESCRIPTORS: STABBING

## 2022-06-04 ASSESSMENT — PAIN DESCRIPTION - LOCATION
LOCATION: BACK

## 2022-06-04 ASSESSMENT — PAIN SCALES - GENERAL
PAINLEVEL_OUTOF10: 8
PAINLEVEL_OUTOF10: 8
PAINLEVEL_OUTOF10: 9
PAINLEVEL_OUTOF10: 9
PAINLEVEL_OUTOF10: 8

## 2022-06-04 ASSESSMENT — PAIN - FUNCTIONAL ASSESSMENT
PAIN_FUNCTIONAL_ASSESSMENT: PREVENTS OR INTERFERES SOME ACTIVE ACTIVITIES AND ADLS

## 2022-06-04 ASSESSMENT — PAIN DESCRIPTION - ONSET
ONSET: ON-GOING

## 2022-06-04 ASSESSMENT — PAIN DESCRIPTION - ORIENTATION
ORIENTATION: LOWER

## 2022-06-04 ASSESSMENT — PAIN DESCRIPTION - PAIN TYPE
TYPE: CHRONIC PAIN

## 2022-06-04 ASSESSMENT — PAIN DESCRIPTION - FREQUENCY
FREQUENCY: CONTINUOUS

## 2022-06-04 ASSESSMENT — ENCOUNTER SYMPTOMS
SHORTNESS OF BREATH: 1
GASTROINTESTINAL NEGATIVE: 1

## 2022-06-04 NOTE — PROGRESS NOTES
Rn entered room to do assessment, med pass and vitals. Patient refused at this time. Patient did allow RN to get a BP. BP stable. Patient on bipap. RN told patient she would return in one hour.

## 2022-06-04 NOTE — PROGRESS NOTES
[address]  Cardiology  Progress Note    Admission date:  2022    Reason for follow up visit: CHF    HPI/CC: Rosanna Thrasher is a 45 y.o. male who was admitted 2022 for shortness of breath. Stress test showed EF 26%, mainly fixed defects with moderate darrick-infarct ischemia. Echo showed EF 20-25%. He has also been treated for ESRD. Rhythm has been sinus. Subjective: Ongoing chest pressure and SOB. Vitals:  Blood pressure (!) 119/56, pulse 59, temperature 98.2 °F (36.8 °C), temperature source Oral, resp. rate 18, height 5' 6\" (1.676 m), weight 231 lb 11.2 oz (105.1 kg), SpO2 100 %.   Temp  Av.1 °F (36.7 °C)  Min: 97.5 °F (36.4 °C)  Max: 98.6 °F (37 °C)  Pulse  Av.3  Min: 59  Max: 81  BP  Min: 113/58  Max: 143/88  SpO2  Av.3 %  Min: 91 %  Max: 100 %  FiO2   Av %  Min: 30 %  Max: 30 %    24 hour I/O    Intake/Output Summary (Last 24 hours) at 2022 1323  Last data filed at 2022 1102  Gross per 24 hour   Intake 840 ml   Output 50 ml   Net 790 ml     Current Facility-Administered Medications   Medication Dose Route Frequency Provider Last Rate Last Admin    tiotropium-olodaterol (STIOLTO) 2.5-2.5 MCG/ACT inhaler 2 puff  2 puff Inhalation Nightly Teri Hinojosa MD   2 puff at 22 210    lactobacillus (CULTURELLE) capsule 1 capsule  1 capsule Oral Daily with breakfast Teri Hinojosa MD   1 capsule at 22 1003    gabapentin (NEURONTIN) capsule 200 mg  200 mg Oral TID Teri Hinojosa MD   200 mg at 22 1003    oxyCODONE-acetaminophen (PERCOCET) 7.5-325 MG per tablet 1 tablet  1 tablet Oral Q6H PRN Teri Hinojosa MD        sacubitril-valsartan (ENTRESTO) 24-26 MG per tablet 1 tablet  1 tablet Oral BID Jareth Laird MD   1 tablet at 22 1018    perflutren lipid microspheres (DEFINITY) injection 1.65 mg  1.5 mL IntraVENous ONCE PRN Jareth Laird MD        epoetin siddharth-epbx (RETACRIT) injection 10,000 Units  10,000 Units IntraVENous Q MWF Rafael Jones MD   10,000 Units at 06/03/22 1048    glucose chewable tablet 16 g  4 tablet Oral PRN Iliana Santiago MD        dextrose bolus 10% 125 mL  125 mL IntraVENous PRN Iliana Santiago MD        Or    dextrose bolus 10% 250 mL  250 mL IntraVENous PRN Iliana Santiago MD        glucagon (rDNA) injection 1 mg  1 mg IntraMUSCular PRN Iliana Santiago MD        dextrose 5 % solution  100 mL/hr IntraVENous PRN Iliana Santiago MD        insulin lispro (HUMALOG) injection vial 0-3 Units  0-3 Units SubCUTAneous Nightly Iliana Santiago MD        insulin lispro (HUMALOG) injection vial 0-6 Units  0-6 Units SubCUTAneous TID  Iliana Santiago MD   1 Units at 05/30/22 1643    mupirocin (BACTROBAN) 2 % ointment   Topical Daily Padmini Kenney MD   Given at 06/04/22 1011    QUEtiapine (SEROQUEL) tablet 25 mg  25 mg Oral Nightly Padmini Kenney MD   25 mg at 06/03/22 2032    albuterol sulfate  (90 Base) MCG/ACT inhaler 2 puff  2 puff Inhalation Q6H PRN Padmini Kenney MD        apixaban [facility]) tablet 5 mg  5 mg Oral BID Padmini Kenney MD   5 mg at 06/04/22 1004    b complex-C-folic acid (NEPHROCAPS) capsule 1 mg  1 mg Oral Daily Padmini Kenney MD   1 mg at 06/04/22 1003    calcium acetate (PHOSLO) capsule 667 mg  1 capsule Oral TID  Padmini Kenney MD   667 mg at 06/04/22 1204    clopidogrel (PLAVIX) tablet 75 mg  75 mg Oral Daily Padmini Kenney MD   75 mg at 06/04/22 1003    calcium carbonate (TUMS) chewable tablet 500 mg  1 tablet Oral TID PRN Padmini Kenney MD        cyclobenzaprine (FLEXERIL) tablet 10 mg  10 mg Oral TID PRN Padmini Kenney MD   10 mg at 06/04/22 1018    docusate sodium (COLACE) capsule 100 mg  100 mg Oral Daily Padmini Kenney MD   100 mg at 05/31/22 6467    DULoxetine (CYMBALTA) extended release capsule 30 mg  30 mg Oral Daily Padmini Kenney MD   30 mg at 06/04/22 1003    ipratropium-albuterol (DUONEB) nebulizer solution 3 mL  1 vial Inhalation Q6H PRN Tonio Schafer MD        isosorbide dinitrate (ISORDIL) tablet 10 mg  10 mg Oral TID Tonio Schafer MD   10 mg at 06/04/22 1004    sennosides-docusate sodium (SENOKOT-S) 8.6-50 MG tablet 1 tablet  1 tablet Oral Daily Tonio Schafer MD   1 tablet at 05/31/22 0826    pravastatin (PRAVACHOL) tablet 40 mg  40 mg Oral Nightly Tonio Schafer MD   40 mg at 06/03/22 2032    pantoprazole (PROTONIX) tablet 40 mg  40 mg Oral QAM AC Tonio Schafer MD   40 mg at 06/04/22 2644    metoprolol succinate (TOPROL XL) extended release tablet 50 mg  50 mg Oral BID Tonio Schafer MD   50 mg at 06/04/22 1004    nitroGLYCERIN (NITROSTAT) SL tablet 0.4 mg  0.4 mg SubLINGual Q5 Min TRISHA Schafer MD        mineral oil liquid 30 mL  30 mL Oral Daily PRN Tonio Schafer MD        oxyCODONE-acetaminophen (PERCOCET) 7.5-325 MG per tablet 1 tablet  1 tablet Oral Q6H PRN Tonio Schafer MD   1 tablet at 06/04/22 1204    sodium chloride flush 0.9 % injection 5-40 mL  5-40 mL IntraVENous 2 times per day Tonio Schafer MD   10 mL at 06/03/22 2037    sodium chloride flush 0.9 % injection 5-40 mL  5-40 mL IntraVENous BRIAN Tonio Schafer MD   10 mL at 05/31/22 0321    0.9 % sodium chloride infusion   IntraVENous TRISHA Schafer MD        ondansetron (ZOFRAN-ODT) disintegrating tablet 4 mg  4 mg Oral Q8H PRN Tonio Schafer MD        Or    ondansetron [facility]) injection 4 mg  4 mg IntraVENous Q6H TRISHA Schafer MD        polyethylene glycol [facility]) packet 17 g  17 g Oral Daily PRN Tonio Schafer MD        acetaminophen (TYLENOL) tablet 650 mg  650 mg Oral Q6H PRN Tonio Schafer MD        Or   Velazquez acetaminophen (TYLENOL) suppository 650 mg  650 mg Rectal Q6H PRN Tonio Schafer MD        hydrOXYzine (ATARAX) tablet 10 mg  10 mg Oral TID PRN Tonio Schafer MD        traZODone (DESYREL) tablet 50 mg  50 mg Oral Nightly PRN Tonio Schafer MD         Review of Systems   Constitutional: Negative. Respiratory: Positive for shortness of breath. Cardiovascular: Positive for chest pain. Gastrointestinal: Negative. Neurological: Negative. Objective:     Telemetry monitor: SR    Physical Exam:  Constitutional:  Comfortable and alert, NAD, appears stated age  Eyes: PERRL, sclera nonicteric  Neck:  Supple, no masses, no thyroidmegaly, JVD difficult to assess  Skin:  Warm and dry; no rash or lesions  Heart: Regular, normal apex, S1 and S2 normal, +murmur  Lungs:  Normal respiratory effort; clear; no wheezing/rhonchi/rales  Abdomen: soft, non tender, + bowel sounds  Extremities:  No edema or cyanosis; no clubbing  Neuro: alert and oriented, moves legs and arms equally, normal mood and affect    Data Reviewed:    Stress test 6/2/2022:   Severe LV dysfunction EF 26%    Global hypokinesis with regional variation, more pronounced in inferolateral    wall and apex    Large mainly fixed defect in inferior wall, extends to portions of lateral    wall and apex with moderate darrick-infarct ischemia     Echo 6/3/2022:  Definity® used for myocardial border enhancement. Left ventricular systolic function is severely reduced with a visually   estimated ejection fraction of 20-25 %. EF estimated by Jasmine's method at 24 %. The left ventricle is mildly dilated in size with normal wall thickness. Severe global hypokinesis with regional variation. Grade I diastolic dysfunction with normal LV pressure. There is no evidence of a left ventricular thrombus. Right ventricular systolic function is reduced. A 29 mm Langford Leyda S3 bioprosthetic aortic valve appears well seated   with normal Doppler values. Inadequate tricuspid valve regurgitation to estimate systolic pulmonary   artery pressure. There is a moderate left pleural effusion noted. Coronary angiogram 1/14/2022:  1. Left heart catheterization  2. Selective left and right coronary angiogram  3.  PCI of the RCA with PATRICIA  Procedure Findings:  1. 99% mid RCA  2. 60-70% CIRC and DIAG  3. Elevated left heart hemodynamics              ~LVEDP 30       Details:              Igor Ann was brought to the cardiac catheterization lab in a fasting state after informed consent was obtained. If the patient was able to provide written consent, it was obtained.              DRZ patient's vitals were monitored through out the procedure. The patient was sedated using the appropriate levels of sedation and ASA guidelines.              The appropriate access site area was prepped and drapped in a sterile fashion. The area was anesthetized with 2% lidocaine. Using the modified Seldinger technique, an arterial sheath was introduced into the arterial access site using a single anterior wall puncture. The sheath was flushed and prepped in usual fashion.              Catheters used during the procedure included 5 Romansh TIG 4.0 catheter. The catheters were advanced and removed over a .035\" wire, into the appropriate positions. Multiple angiographic views were obtained. An LV gram was not obtained.              ~The interventional portion of the procedure was completed utilizing a multipurpose 6 [place] guide.  Multipurpose guide was advanced into the right coronary ostium.  A gentleman therapy aspirin, Plavix, Angiomax was initiated.  A BMW wire was advanced into the distal right coronary artery.  The lesion was initially predilated with a 2.75 x 15 mm Euphora balloon followed by a 3.5 mm x 20 mm Euphora balloon and subsequently stented with a resolute Willam 4.0 mm x 38 mm balloon.  We did experience a no reflow phenomenon.  We separately done.  A twin pass catheter and then did local drug delivery with 200 mcg of nitroprusside and 200 mcgnitroglycerin.  Provided restoration of SCARLETT-3 flow. Findings:  1. Left main coronary artery was normal. It gave off the left anterior descending artery and left circumflex.   2. Left anterior descending artery has 60% severe atherosclerotic disease.  It was moderate in size. It gave off septal perforators and a moderate sized diagonal branch. The LAD covered the entire apex of the left ventricle. 3. Left circumflex has 60% severe atherosclerotic disease.  It was moderate in size. There was a moderate sized obtuse marginal branch. 4. Right coronary artery has 99% severe atherosclerotic disease. It was moderate in size and was the dominant artery. CONCLUSIONS:  1.  Apercutaneous coronary intervention of the right coronary artery utilizing a single resolute Willam drug-eluting stent  ASSESSMENT/RECOMMENDATIONS:  1.  Dual antiplatelet therapy  2.  Medical management of the remaining coronary disease     Echo 1/2022:   Limited only f/u for LVEF and RVF.   The left ventricular systolic function is mildly reduced with an ejection   fraction of 40-45 %.   There is hypokinesis of the apex and inferior walls.   There is a very small circumferential pericardial effusion noted.   No tamponade physiology.   The right ventricle is normal in size and function.     Lab Reviewed:     Renal Profile:  Lab Results   Component Value Date    CREATININE 3.9 06/03/2022    BUN 23 06/03/2022     06/03/2022    K 3.5 06/03/2022    CL 95 06/03/2022    CO2 24 06/03/2022     CBC:    Lab Results   Component Value Date    WBC 9.6 06/01/2022    RBC 2.87 06/01/2022    HGB 8.6 06/01/2022    HCT 26.6 06/01/2022    MCV 92.9 06/01/2022    RDW 16.3 06/01/2022     06/01/2022     BNP:    Lab Results   Component Value Date    PROBNP >70,000 05/29/2022    PROBNP 51,922 05/25/2022    PROBNP >70,000 05/09/2022    PROBNP >70,000 04/24/2022    PROBNP >70,000 04/18/2022     Fasting Lipid Panel:    Lab Results   Component Value Date    CHOL 167 03/18/2022    HDL 38 03/18/2022    TRIG 213 03/18/2022     Cardiac Enzymes:  CK/MbTroponin  Lab Results   Component Value Date    CKTOTAL 45 05/25/2022    TROPONINI 0.09 05/30/2022     PT/ INR   Lab Results   Component Value Date    INR 1.21 05/29/2022    INR 0.97 04/25/2022    INR 1.03 03/21/2022    PROTIME 15.1 05/29/2022    PROTIME 11.0 04/25/2022    PROTIME 11.6 03/21/2022     PTT No results found for: PTT   Lab Results   Component Value Date    MG 2.10 06/03/2022      Lab Results   Component Value Date    TSH 2.15 03/17/2022     All labs and imaging reviewed today    Assessment:  Elevated troponin: chronic elevation due to poor clearance  CAD: s/p PATRICIA RCA 1/14/2022; s/p PATRICIA LAD 2015  Acute on chronic systolic CHF: ongoing  Bicuspid AV/severe AS: s/p TAVR 10/2019  Ischemic cardiomyopathy: EF worse, EF 20-25% on echo 6/3/2022; EF 40-45% on echo 1/12/2022  Paroxysmal atrial fibrillation: stable   - [id number] score >2 on eliquis  PVD: s/p PTA/stent R external iliac 5/2019  Bradycardia/transient sinus pauses: noted 12/12/2020, no recurrence  HTN: stable  HLD: improved, LDL 86, statin and recheck  ESRD: on HD MWF; follows with Dr. Lynn Mckeon  DM: hgb a1c 6.6  Anemia  ZAINAB  History of CVA    Plan:   1. Increase isordil  2. Nephrology lowering target weight  3. If CP/SOB persist despite medical therapy and increased fluid removal with dialysis, will have interventionalist evaluate Monday for possible LHC given new drop in EF. 4. Continue plavix, toprol, entresto  5. Continue eliquis for PAF; if ongoing symptoms tomorrow, will plan to hold in preparation for possible [facility] Monday.     James Camargo, APRN-CNP  [address]  [phone]

## 2022-06-04 NOTE — PLAN OF CARE
Problem: Discharge Planning  Goal: Discharge to home or other facility with appropriate resources  6/4/2022 1333 by Marci Levi RN  Outcome: Progressing    Problem: Pain  Goal: Verbalizes/displays adequate comfort level or baseline comfort level  6/4/2022 1333 by Marci Levi RN  Outcome: Progressing    Problem: Safety - Adult  Goal: Free from fall injury  6/4/2022 1333 by Marci Levi RN  Outcome: Progressing       Problem: ABCDS Injury Assessment  Goal: Absence of physical injury  6/4/2022 1333 by Marci Levi RN  Outcome: Progressing     Problem: Chronic Conditions and Co-morbidities  Goal: Patient's chronic conditions and co-morbidity symptoms are monitored and maintained or improved  6/4/2022 1333 by Marci Levi RN  Outcome: Progressing     Problem: Nutrition Deficit:  Goal: Optimize nutritional status  6/4/2022 1333 by Marci Levi RN  Outcome: Progressing

## 2022-06-04 NOTE — PROGRESS NOTES
Patient's EF (Ejection Fraction) is less than 40%    Heart Failure Medications:   Diuretics[de-identified] Furosemide, Torsemide, Spironolactone, Metalozone, Other and None     (One of the following REQUIRED for EF </= 40%/SYSTOLIC FAILURE but MAY be used in EF% >40%/DIASTOLIC FAILURE)        ACE[de-identified] None        ARB[de-identified] None         ARNI[de-identified] None    (Beta Blockers)   NON- Evidenced Based Beta Blocker (for EF% >40%/DIASTOLIC FAILURE): None     Evidenced Based Beta Blocker::(REQUIRED for EF% <40%/SYSTOLIC FAILURE) Metoprolol SUCCinate- Toprol XL  . .................................................................................................................................................. Patient's weights and intake/output reviewed: Yes    Patient's Last Weight: 105.1 kg obtained by standing scale. Difference of 3.9kg less than last documented weight.       Intake/Output Summary (Last 24 hours) at 6/4/2022 0641  Last data filed at 6/3/2022 2346  Gross per 24 hour   Intake 600 ml   Output 50 ml   Net 550 ml       Comorbidities Reviewed Yes    Patient has a past medical history of Ambulatory dysfunction, Aortic stenosis, Arthritis, Asthma, Bilateral hilar adenopathy syndrome, CAD (coronary artery disease), Cardiomyopathy ([address] CHF (congestive heart failure) ([address] Chronic pain, COPD (chronic obstructive pulmonary disease) ([address] Depression, Diabetes mellitus ([address] Difficult intravenous access, Emphysema of lung ([address] ESRD (end stage renal disease) on dialysis ([address] Fear of needles, Gastric ulcer, GERD (gastroesophageal reflux disease), Heart valve problem, Hemodialysis patient ([address] History of spinal fracture, Hx of blood clots, Hyperlipidemia, Hypertension, MI (myocardial infarction) ([address] Neuromuscular disorder ([address] Numbness and tingling in left arm, Pneumonia, PONV (postoperative nausea and vomiting), Prolonged emergence from general anesthesia, Sleep apnea, Stroke ([address] TIA (transient ischemic attack), and Unspecified diseases of blood and blood-forming organs. >>For CHF and Comorbidity documentation on Education Time and Topics, please see Education Tab    Progressive Mobility Assessment:  What is this patient's Current Level of Mobility?: Ambulatory-Up [place]  How was this patient Mobilized today?: Edge of Bed, Up to Chair, Bedside Commode,  Up to Toilet/Shower, Up in Room, Up in [place], Unable to Mobilize and Patient Refuses to Mobilize, ambulated 20 ft                 With Whom? Nurse, PCA, PT, OT and Self                 Level of Difficulty/Assistance: Independent     Pt resting in bed at this time on CPAP. Pt denies shortness of breath. Pt with nonpitting lower extremity edema.      Patient and/or Family's stated Goal of Care this Admission: reduce shortness of breath, increase activity tolerance, better understand heart failure and disease management, be more comfortable and reduce lower extremity edema prior to discharge

## 2022-06-04 NOTE — PROGRESS NOTES
[facility] Progress Note           Subjective/   45y.o. year old male who we are seeing in consultation for ESKD on HD. HPI:  The patient was seen and examined; he feels well today with no CP, SOB, nausea or vomiting. ROS: No fever or chills. Social: No family at bedside. Objective/   GEN:  Chronically ill, BP (!) 141/69   Pulse 61   Temp 97.5 °F (36.4 °C) (Oral)   Resp 19   Ht 5' 6\" (1.676 m)   Wt 231 lb 11.2 oz (105.1 kg)   SpO2 91%   BMI 37.40 kg/m²      HEENT: non-icteric, no JVD  CV: S1, S2 without m/r/g; no LE edema  RESP: CTA B without w/r/r; breathing wnl  ABD: +bs, soft, nt, no hsm  SKIN: warm, no rashes  ACCESS: Left IJ TDC    Data/  No results for input(s): WBC, HGB, HCT, MCV, PLT in the last 72 hours. Recent Labs     06/03/22  0906   *   K 3.5   CL 95*   CO2 24   GLUCOSE 126*   MG 2.10   BUN 23*   CREATININE 3.9*   LABGLOM 16*   GFRAA 20*       Assessment/     # End stage kidney disease - on HD Mon-Wed-Fri     # Acute on chronic respiratory failure r/o sepsis - started on antibiotics     # sCHF - EF ~40%, decompensated     # Hypertension - poorly controlled     # Anemia of chronic disease -        Plan/     - HD per MWF schedule with UF as tolerated.   - EDW has been reduced  - DAVON 10K qHD

## 2022-06-04 NOTE — PROGRESS NOTES
06/03/22 2346   NIV Type   Skin Assessment Clean, dry, & intact   Equipment Type v60   Mode AVAPS   Mask Type Full face mask   Mask Size Large   Settings/Measurements   CPAP/EPAP 6 cmH2O   IPAP Min 18 cmH2O   IPAP Max 25 cmH2O   Vt (Set, mL) 700 mL   Resp 25   FiO2  30 %   I Time/ I Time % 1 s   Vt Exhaled 1011 mL   Minute Volume 21.5 Liters   Mask Leak (lpm) 23 lpm   Comfort Level Good   Using Accessory Muscles No

## 2022-06-04 NOTE — PLAN OF CARE
Problem: Nutrition Deficit:  Goal: Optimize nutritional status  Outcome: Not Progressing     Problem: Discharge Planning  Goal: Discharge to home or other facility with appropriate resources  Outcome: Progressing     Problem: Pain  Goal: Verbalizes/displays adequate comfort level or baseline comfort level  Outcome: Progressing     Problem: Safety - Adult  Goal: Free from fall injury  Outcome: Progressing     Problem: ABCDS Injury Assessment  Goal: Absence of physical injury  Outcome: Progressing     Problem: Chronic Conditions and Co-morbidities  Goal: Patient's chronic conditions and co-morbidity symptoms are monitored and maintained or improved  Outcome: Progressing

## 2022-06-04 NOTE — PROGRESS NOTES
06/04/22 0352   NIV Type   $NIV $Daily Charge   Skin Assessment Clean, dry, & intact   Skin Protection for O2 Device No   Equipment Type v60   Mode AVAPS   Mask Type Full face mask   Mask Size Large   Settings/Measurements   CPAP/EPAP 6 cmH2O   IPAP Min 18 cmH2O   IPAP Max 25 cmH2O   Vt (Set, mL) 700 mL   Resp 15   FiO2  30 %   I Time/ I Time % 1 s   Vt Exhaled 790 mL   Minute Volume 12.1 Liters   Mask Leak (lpm) 9 lpm   Comfort Level Good   Using Accessory Muscles No

## 2022-06-04 NOTE — PROGRESS NOTES
Hospitalist Progress Note      PCP: Yaw Kicthen MD    Date of Admission: 5/29/2022    Chief Complaint: confusion at home       Subjective: When he walked to the bathroom today he developed severe dyspnea and chest pressure. He feels like if he goes home he will just get readmitted in short order again (it only took 4 hours last time.)    Nephrology - can we get his target weight any lower? If not, then cardiology - do we need a [facility] since he is still having significant symptoms? Should we be holding apixaban?         Medications:  Reviewed    Infusion Medications    dextrose      sodium chloride       Scheduled Medications    tiotropium-olodaterol  2 puff Inhalation Nightly    lactobacillus  1 capsule Oral Daily with breakfast    gabapentin  200 mg Oral TID    sacubitril-valsartan  1 tablet Oral BID    epoetin siddharth-epbx  10,000 Units IntraVENous Q MWF    insulin lispro  0-3 Units SubCUTAneous Nightly    insulin lispro  0-6 Units SubCUTAneous TID WC    mupirocin   Topical Daily    QUEtiapine  25 mg Oral Nightly    apixaban  5 mg Oral BID    b complex-C-folic acid  1 mg Oral Daily    calcium acetate  1 capsule Oral TID WC    clopidogrel  75 mg Oral Daily    docusate sodium  100 mg Oral Daily    DULoxetine  30 mg Oral Daily    isosorbide dinitrate  10 mg Oral TID    sennosides-docusate sodium  1 tablet Oral Daily    pravastatin  40 mg Oral Nightly    pantoprazole  40 mg Oral QAM AC    metoprolol succinate  50 mg Oral BID    sodium chloride flush  5-40 mL IntraVENous 2 times per day     PRN Meds: oxyCODONE-acetaminophen, perflutren lipid microspheres, glucose, dextrose bolus **OR** dextrose bolus, glucagon (rDNA), dextrose, albuterol sulfate HFA, calcium carbonate, cyclobenzaprine, ipratropium-albuterol, nitroGLYCERIN, mineral oil, oxyCODONE-acetaminophen, sodium chloride flush, sodium chloride, ondansetron **OR** ondansetron, polyethylene glycol, acetaminophen **OR** acetaminophen, hydrOXYzine HCl, traZODone      Intake/Output Summary (Last 24 hours) at 6/4/2022 1236  Last data filed at 6/4/2022 1102  Gross per 24 hour   Intake 840 ml   Output 50 ml   Net 790 ml       Physical Exam Performed:    BP (!) 119/56   Pulse 59   Temp 98.2 °F (36.8 °C) (Oral)   Resp 18   Ht 5' 6\" (1.676 m)   Wt 231 lb 11.2 oz (105.1 kg)   SpO2 100%   BMI 37.40 kg/m²       General appearance: No apparent distress, appears stated age and cooperative. HEENT: Pupils equal, round, and reactive to light. Conjunctivae/corneas clear. Neck: Supple, with full range of motion. No jugular venous distention. Trachea midline. Respiratory:  Normal respiratory effort. Clear to auscultation, bilaterally without Rales/Wheezes/Rhonchi. Diminished throughout. Cardiovascular: Regular rate and rhythm with normal S1/S2 without murmurs, rubs or gallops. Abdomen: Soft, non-tender, non-distended with normal bowel sounds. Musculoskeletal: No clubbing, cyanosis or edema bilaterally. Full range of motion without deformity. Skin: Skin color, texture, turgor normal.  No rashes or lesions. Neurologic:  Neurovascularly intact without any focal sensory/motor deficits. Cranial nerves: II-XII intact, grossly non-focal.  Psychiatric: Alert and oriented, thought content appropriate, normal insight. Anxious affect. Capillary Refill: Brisk,3 seconds, normal   Peripheral Pulses: +2 palpable, equal bilaterally       Labs:   No results for input(s): WBC, HGB, HCT, PLT in the last 72 hours. Recent Labs     06/03/22  0906   *   K 3.5   CL 95*   CO2 24   BUN 23*   CREATININE 3.9*   CALCIUM 9.0     No results for input(s): AST, ALT, BILIDIR, BILITOT, ALKPHOS in the last 72 hours. No results for input(s): INR in the last 72 hours. No results for input(s): Connee Matar in the last 72 hours.     Urinalysis:      Lab Results   Component Value Date    NITRU Negative 05/29/2022    WBCUA 10-20 05/29/2022    BACTERIA 3+ 05/29/2022 RBCUA 5-10 05/29/2022    BLOODU TRACE-INTACT 05/29/2022    SPECGRAV 1.015 05/29/2022    GLUCOSEU 100 05/29/2022       Radiology:  NM Cardiac Stress Test Nuclear Imaging   Final Result      CT HEAD WO CONTRAST   Final Result   No acute intracranial abnormality. CT CHEST PULMONARY EMBOLISM W CONTRAST   Final Result   Negative for acute pulmonary embolus. Bilateral patchy areas of atelectasis or infiltrate in the lungs most   pronounced in the left lower lobe. Bilateral pleural effusions (left greater than right). Mediastinal adenopathy possibly reactive in nature. Recommend follow-up CT   chest in 1-3 months to confirm resolution unless clinically indicated sooner. XR CHEST PORTABLE   Final Result   1. Central predominant edema potentially of cardiac or noncardiogenic origin. Pneumonia could appear similar. 2. Increased left basilar airspace opacity due in part to passive atelectasis   given at least trace to small left pleural effusion. Underlying rounded   atelectasis is likely present given the prior CT appearance, and superimposed   pneumonia and aspiration are not excluded. 3. At least trace right pleural effusion. Assessment/Plan:    Active Hospital Problems    Diagnosis     Coronary artery disease of native artery of native heart with stable angina pectoris ([facility] [I25.118]      Priority: High    Altered mental status [R41.82]      Priority: Medium    Hypoglycemia [E16.2]      Priority: Medium    History of transcatheter aortic valve replacement (TAVR) [Z95.2]      Priority: Medium    ESRD (end stage renal disease) ([facility] [N18.6]     Elevated troponin [R77.8]        \"46 y.o. male  with multiple medical issueswho presented [facility] with confusion/sob/chest pain. Pt states he has chronic sob and chest pain(has element of anxiety) and yesterday he developed inc'd sob that was fairly sudden onset and continued into the night.  He also noted ongoing substernal chest pain, nonradiating that would last a few hours(would subside on its own with deep breathing). \"      HCAP, with sepsis present on arrival.  No guiding culture data. Completed course of empiric vanc and zosyn. Complicated by diarrhea, likely abx-induced. Patient has h/o c.diff. Diarrhea improved now that he is off abx. Probiotic. Pleural effusions, L>R. Likely due to VO from acute on chronic systolic and diastolic CHF, ESRD. F/u CXR. ESRD on HD. Nephrology removed fluid, lowered target weight, added extra ultrafiltration session on 6/2, and continued with MWF schedule thereafter. Chronic hypoxic respiratory failure. Baseline 3-4LNC. Chest pain, with known h/o CAD.  Appreciate cardiology input. Stress test showed mostly scar, some ischemia, similar to prior. TTE showed EF 20-25% with RWMAs (EF 40-45% in 1/2022).  Continue clopidogrel, statin, metoprolol, ISDN. Cardiology noted that they could consider LHC if his symptoms persist after adequate fluid removal.     DM2.  Complicated by hypoglycemia on admission, with acute metabolic encephalopathy.  Stopped insulin and his sugars were fine.  F/u with PCP.     HTN. ISDN, metoprolol. Cardiology started entresto. Cardiomyopathy. Likely ischemic. Metoprolol and entresto as above. Cardiology could consider an AICD in 90 days if no improvement.      PAF.  Apixaban, metoprolol.         DVT Prophylaxis: anticoagulation as above  Diet: ADULT DIET; Regular; Low Sodium (2 gm); Low Potassium (Less than 3000 mg/day); Low Phosphorus (Less than 1000 mg)  ADULT ORAL NUTRITION SUPPLEMENT; Breakfast, Dinner; Low Calorie/High Protein Oral Supplement  Code Status: Full Code    PT/OT Eval Status: rec'd home PT/OT    Dispo - unclear at this point. High risk for ongoing readmissions.   Here is what we need prior to discharge:    - nephrology input as to whether his dry weight can be lowered any further.  - cardiology input as to whether they plan to perform a LHC on this admission.         Alvina Wilder MD

## 2022-06-05 ENCOUNTER — APPOINTMENT (OUTPATIENT)
Dept: GENERAL RADIOLOGY | Age: 52
DRG: 871 | End: 2022-06-05
Payer: COMMERCIAL

## 2022-06-05 LAB
GLUCOSE BLD-MCNC: 145 MG/DL (ref 70–99)
GLUCOSE BLD-MCNC: 147 MG/DL (ref 70–99)
GLUCOSE BLD-MCNC: 156 MG/DL (ref 70–99)
GLUCOSE BLD-MCNC: 196 MG/DL (ref 70–99)
PERFORMED ON: ABNORMAL

## 2022-06-05 PROCEDURE — 6370000000 HC RX 637 (ALT 250 FOR IP): Performed by: INTERNAL MEDICINE

## 2022-06-05 PROCEDURE — 2700000000 HC OXYGEN THERAPY PER DAY

## 2022-06-05 PROCEDURE — 99233 SBSQ HOSP IP/OBS HIGH 50: CPT | Performed by: NURSE PRACTITIONER

## 2022-06-05 PROCEDURE — 94761 N-INVAS EAR/PLS OXIMETRY MLT: CPT

## 2022-06-05 PROCEDURE — 94660 CPAP INITIATION&MGMT: CPT

## 2022-06-05 PROCEDURE — 6370000000 HC RX 637 (ALT 250 FOR IP): Performed by: NURSE PRACTITIONER

## 2022-06-05 PROCEDURE — 1200000000 HC SEMI PRIVATE

## 2022-06-05 PROCEDURE — 2500000003 HC RX 250 WO HCPCS: Performed by: INTERNAL MEDICINE

## 2022-06-05 PROCEDURE — 71045 X-RAY EXAM CHEST 1 VIEW: CPT

## 2022-06-05 PROCEDURE — 2580000003 HC RX 258: Performed by: INTERNAL MEDICINE

## 2022-06-05 RX ADMIN — OXYCODONE AND ACETAMINOPHEN 1 TABLET: 7.5; 325 TABLET ORAL at 14:25

## 2022-06-05 RX ADMIN — NEPHROCAP 1 MG: 1 CAP ORAL at 08:53

## 2022-06-05 RX ADMIN — QUETIAPINE FUMARATE 25 MG: 25 TABLET ORAL at 20:26

## 2022-06-05 RX ADMIN — SACUBITRIL AND VALSARTAN 1 TABLET: 24; 26 TABLET, FILM COATED ORAL at 08:58

## 2022-06-05 RX ADMIN — SODIUM CHLORIDE, PRESERVATIVE FREE 10 ML: 5 INJECTION INTRAVENOUS at 20:27

## 2022-06-05 RX ADMIN — OXYCODONE AND ACETAMINOPHEN 1 TABLET: 7.5; 325 TABLET ORAL at 00:18

## 2022-06-05 RX ADMIN — Medication 1 CAPSULE: at 08:53

## 2022-06-05 RX ADMIN — DULOXETINE HYDROCHLORIDE 30 MG: 30 CAPSULE, DELAYED RELEASE ORAL at 08:53

## 2022-06-05 RX ADMIN — CALCIUM ACETATE 667 MG: 667 CAPSULE ORAL at 13:00

## 2022-06-05 RX ADMIN — OXYCODONE AND ACETAMINOPHEN 1 TABLET: 7.5; 325 TABLET ORAL at 20:27

## 2022-06-05 RX ADMIN — CLOPIDOGREL BISULFATE 75 MG: 75 TABLET ORAL at 08:53

## 2022-06-05 RX ADMIN — ISOSORBIDE DINITRATE 20 MG: 20 TABLET ORAL at 08:53

## 2022-06-05 RX ADMIN — GABAPENTIN 200 MG: 100 CAPSULE ORAL at 20:26

## 2022-06-05 RX ADMIN — CYCLOBENZAPRINE 10 MG: 10 TABLET, FILM COATED ORAL at 16:23

## 2022-06-05 RX ADMIN — METOPROLOL SUCCINATE 50 MG: 50 TABLET, EXTENDED RELEASE ORAL at 08:53

## 2022-06-05 RX ADMIN — ISOSORBIDE DINITRATE 20 MG: 20 TABLET ORAL at 14:26

## 2022-06-05 RX ADMIN — MUPIROCIN: 20 OINTMENT TOPICAL at 08:55

## 2022-06-05 RX ADMIN — TRAZODONE HYDROCHLORIDE 50 MG: 50 TABLET ORAL at 20:27

## 2022-06-05 RX ADMIN — PRAVASTATIN SODIUM 40 MG: 40 TABLET ORAL at 20:27

## 2022-06-05 RX ADMIN — PANTOPRAZOLE SODIUM 40 MG: 40 TABLET, DELAYED RELEASE ORAL at 07:17

## 2022-06-05 RX ADMIN — METOPROLOL SUCCINATE 50 MG: 50 TABLET, EXTENDED RELEASE ORAL at 20:26

## 2022-06-05 RX ADMIN — SACUBITRIL AND VALSARTAN 1 TABLET: 24; 26 TABLET, FILM COATED ORAL at 20:30

## 2022-06-05 RX ADMIN — ISOSORBIDE DINITRATE 20 MG: 20 TABLET ORAL at 20:26

## 2022-06-05 RX ADMIN — SODIUM CHLORIDE, PRESERVATIVE FREE 5 ML: 5 INJECTION INTRAVENOUS at 08:59

## 2022-06-05 RX ADMIN — GABAPENTIN 200 MG: 100 CAPSULE ORAL at 14:26

## 2022-06-05 RX ADMIN — GABAPENTIN 200 MG: 100 CAPSULE ORAL at 08:53

## 2022-06-05 RX ADMIN — CALCIUM ACETATE 667 MG: 667 CAPSULE ORAL at 18:24

## 2022-06-05 RX ADMIN — OXYCODONE AND ACETAMINOPHEN 1 TABLET: 7.5; 325 TABLET ORAL at 07:17

## 2022-06-05 RX ADMIN — CYCLOBENZAPRINE 10 MG: 10 TABLET, FILM COATED ORAL at 23:30

## 2022-06-05 RX ADMIN — CALCIUM ACETATE 667 MG: 667 CAPSULE ORAL at 08:53

## 2022-06-05 ASSESSMENT — PAIN DESCRIPTION - DESCRIPTORS
DESCRIPTORS: ACHING
DESCRIPTORS: ACHING

## 2022-06-05 ASSESSMENT — PAIN DESCRIPTION - ORIENTATION
ORIENTATION: LOWER
ORIENTATION: LOWER

## 2022-06-05 ASSESSMENT — PAIN SCALES - GENERAL
PAINLEVEL_OUTOF10: 9
PAINLEVEL_OUTOF10: 6
PAINLEVEL_OUTOF10: 8
PAINLEVEL_OUTOF10: 9
PAINLEVEL_OUTOF10: 8

## 2022-06-05 ASSESSMENT — PAIN DESCRIPTION - PAIN TYPE
TYPE: CHRONIC PAIN
TYPE: CHRONIC PAIN

## 2022-06-05 ASSESSMENT — ENCOUNTER SYMPTOMS
GASTROINTESTINAL NEGATIVE: 1
SHORTNESS OF BREATH: 1

## 2022-06-05 ASSESSMENT — PAIN DESCRIPTION - FREQUENCY
FREQUENCY: CONTINUOUS
FREQUENCY: CONTINUOUS

## 2022-06-05 ASSESSMENT — PAIN DESCRIPTION - LOCATION
LOCATION: BACK
LOCATION: BACK

## 2022-06-05 NOTE — PROGRESS NOTES
06/05/22 0354   NIV Type   $NIV $Daily Charge   Equipment Type V60   Mode AVAPS   Mask Type Full face mask   Mask Size Large   Settings/Measurements   CPAP/EPAP 6 cmH2O   IPAP Min 18 cmH2O   IPAP Max 25 cmH2O   Vt (Set, mL) 700 mL   Rate Ordered 15   Resp 15   FiO2  30 %   Vt Exhaled 550 mL   Minute Volume 8 Liters   Mask Leak (lpm) 26 lpm   Comfort Level Good   Using Accessory Muscles No   Patient's Home Machine No

## 2022-06-05 NOTE — PROGRESS NOTES
[address]  Cardiology  Progress Note    Admission date:  2022    Reason for follow up visit: CHF    HPI/CC: Angelina Mejia is a 45 y.o. male who was admitted 2022 for shortness of breath. Stress test showed EF 26%, mainly fixed defects with moderate darrick-infarct ischemia. Echo showed EF 20-25%. He has also been treated for ESRD. Rhythm has been sinus. Subjective: Slight improvement in chest pain and shortness of breath but still worse than baseline. Vitals:  Blood pressure 111/60, pulse 66, temperature 97.6 °F (36.4 °C), temperature source Oral, resp. rate 18, height 5' 6\" (1.676 m), weight 231 lb 11.2 oz (105.1 kg), SpO2 98 %.   Temp  Av.8 °F (36.6 °C)  Min: 97.5 °F (36.4 °C)  Max: 98.2 °F (36.8 °C)  Pulse  Av.1  Min: 54  Max: 66  BP  Min: 110/67  Max: 130/74  SpO2  Av.4 %  Min: 98 %  Max: 100 %  FiO2   Av %  Min: 30 %  Max: 30 %    24 hour I/O    Intake/Output Summary (Last 24 hours) at 2022 1056  Last data filed at 2022 0931  Gross per 24 hour   Intake 1620 ml   Output --   Net 1620 ml     Current Facility-Administered Medications   Medication Dose Route Frequency Provider Last Rate Last Admin    isosorbide dinitrate (ISORDIL) tablet 20 mg  20 mg Oral TID JAY Lambert - CNP   20 mg at 22 0853    tiotropium-olodaterol (STIOLTO) 2.5-2.5 MCG/ACT inhaler 2 puff  2 puff Inhalation Nightly Jelena Mayorga MD   2 puff at 22    lactobacillus (CULTURELLE) capsule 1 capsule  1 capsule Oral Daily with breakfast Jelena Mayorga MD   1 capsule at 22 0853    gabapentin (NEURONTIN) capsule 200 mg  200 mg Oral TID Jelena Mayorga MD   200 mg at 22 0853    oxyCODONE-acetaminophen (PERCOCET) 7.5-325 MG per tablet 1 tablet  1 tablet Oral Q6H PRN Jelena Mayorga MD   1 tablet at 22 0717    sacubitril-valsartan (ENTRESTO) 24-26 MG per tablet 1 tablet  1 tablet Oral BID Aida Johnston MD   1 tablet at 22 1308    perflutren lipid microspheres (DEFINITY) injection 1.65 mg  1.5 mL IntraVENous ONCE PRN Hallie Casey MD        epoetin siddharth-epbx (RETACRIT) injection 10,000 Units  10,000 Units IntraVENous Q MWF Leander Wall MD   10,000 Units at 06/03/22 1048    glucose chewable tablet 16 g  4 tablet Oral PRN Mary Pimentel MD        dextrose bolus 10% 125 mL  125 mL IntraVENous PRN Mary Pimentel MD        Or    dextrose bolus 10% 250 mL  250 mL IntraVENous PRN Mary Pimentel MD        glucagon (rDNA) injection 1 mg  1 mg IntraMUSCular PRN Mary Pimentel MD        dextrose 5 % solution  100 mL/hr IntraVENous PRN Mary Pimentel MD        insulin lispro (HUMALOG) injection vial 0-3 Units  0-3 Units SubCUTAneous Nightly Mary Pimentel MD        insulin lispro (HUMALOG) injection vial 0-6 Units  0-6 Units SubCUTAneous TID  Mary Pimentel MD   2 Units at 06/04/22 1746    mupirocin (BACTROBAN) 2 % ointment   Topical Daily Sonia Patel MD   Given at 06/05/22 0855    QUEtiapine (SEROQUEL) tablet 25 mg  25 mg Oral Nightly Sonia Patel MD   25 mg at 06/04/22 2058    albuterol sulfate  (90 Base) MCG/ACT inhaler 2 puff  2 puff Inhalation Q6H PRN Sonia Patel MD        [Held by provider] apixaban [place] [facility]) tablet 5 mg  5 mg Oral BID Sonia Patel MD   5 mg at 06/04/22 2058    b complex-C-folic acid (NEPHROCAPS) capsule 1 mg  1 mg Oral Daily Sonia Patel MD   1 mg at 06/05/22 0853    calcium acetate (PHOSLO) capsule 667 mg  1 capsule Oral TID  Sonia Patel MD   667 mg at 06/05/22 0853    clopidogrel (PLAVIX) tablet 75 mg  75 mg Oral Daily Sonia Patel MD   75 mg at 06/05/22 9349    calcium carbonate (TUMS) chewable tablet 500 mg  1 tablet Oral TID PRN Sonia Patel MD        cyclobenzaprine (FLEXERIL) tablet 10 mg  10 mg Oral TID PRN Sonia Patel MD   10 mg at 06/04/22 2058    docusate sodium (COLACE) capsule 100 mg  100 mg Oral Daily Sonia Patel MD   100 mg at 05/31/22 0826    DULoxetine (CYMBALTA) extended release capsule 30 mg  30 mg Oral Daily Tonio Schafer MD   30 mg at 06/05/22 0853    ipratropium-albuterol (DUONEB) nebulizer solution 3 mL  1 vial Inhalation Q6H PRN Tonio Schafer MD        sennosides-docusate sodium (SENOKOT-S) 8.6-50 MG tablet 1 tablet  1 tablet Oral Daily Tonio Schafer MD   1 tablet at 05/31/22 0826    pravastatin (PRAVACHOL) tablet 40 mg  40 mg Oral Nightly Tonio Schafer MD   40 mg at 06/04/22 2058    pantoprazole (PROTONIX) tablet 40 mg  40 mg Oral QAM AC Tonio Schafer MD   40 mg at 06/05/22 0717    metoprolol succinate (TOPROL XL) extended release tablet 50 mg  50 mg Oral BID Tonio Schafer MD   50 mg at 06/05/22 0853    nitroGLYCERIN (NITROSTAT) SL tablet 0.4 mg  0.4 mg SubLINGual Q5 Min PRN Tonio Schafer MD        mineral oil liquid 30 mL  30 mL Oral Daily PRN Tonio Schafer MD        oxyCODONE-acetaminophen (PERCOCET) 7.5-325 MG per tablet 1 tablet  1 tablet Oral Q6H PRN Tonio Schafer MD   1 tablet at 06/05/22 0018    sodium chloride flush 0.9 % injection 5-40 mL  5-40 mL IntraVENous 2 times per day Tonio Schafer MD   5 mL at 06/05/22 0859    sodium chloride flush 0.9 % injection 5-40 mL  5-40 mL IntraVENous PRN Tonio Schafer MD   10 mL at 05/31/22 0321    0.9 % sodium chloride infusion   IntraVENous PRN Tonio Schafer MD        ondansetron (ZOFRAN-ODT) disintegrating tablet 4 mg  4 mg Oral Q8H PRN Tonio Schafer MD        Or    ondansetron [facility]) injection 4 mg  4 mg IntraVENous Q6H PRN Tonio Schafer MD        polyethylene glycol [facility]) packet 17 g  17 g Oral Daily PRN Tonio Schafer MD        acetaminophen (TYLENOL) tablet 650 mg  650 mg Oral Q6H PRN Tonio Schafer MD        Or   Watson Self acetaminophen (TYLENOL) suppository 650 mg  650 mg Rectal Q6H PRN Tonio Schafer MD        hydrOXYzine (ATARAX) tablet 10 mg  10 mg Oral TID PRN Tonio Schafer MD       Yuval Self traZODone (DESYREL) tablet 50 mg  50 mg Oral Nightly PRN Curt Kasper MD   50 mg at 06/04/22 2058     Review of Systems   Constitutional: Negative. Respiratory: Positive for shortness of breath. Cardiovascular: Positive for chest pain. Gastrointestinal: Negative. Neurological: Negative. Objective:     Telemetry monitor: SR    Physical Exam:  Constitutional:  Comfortable and alert, NAD, appears stated age  Eyes: PERRL, sclera nonicteric  Neck:  Supple, no masses, no thyroidmegaly, JVD difficult to assess  Skin:  Warm and dry; no rash or lesions  Heart: Regular, normal apex, S1 and S2 normal, +murmur  Lungs:  Normal respiratory effort; clear; no wheezing/rhonchi/rales  Abdomen: soft, non tender, + bowel sounds  Extremities:  No edema or cyanosis; no clubbing  Neuro: alert and oriented, moves legs and arms equally, normal mood and affect    Data Reviewed:    Stress test 6/2/2022:   Severe LV dysfunction EF 26%    Global hypokinesis with regional variation, more pronounced in inferolateral    wall and apex    Large mainly fixed defect in inferior wall, extends to portions of lateral    wall and apex with moderate darrick-infarct ischemia     Echo 6/3/2022:  Definity® used for myocardial border enhancement. Left ventricular systolic function is severely reduced with a visually   estimated ejection fraction of 20-25 %. EF estimated by Jasmine's method at 24 %. The left ventricle is mildly dilated in size with normal wall thickness. Severe global hypokinesis with regional variation. Grade I diastolic dysfunction with normal LV pressure. There is no evidence of a left ventricular thrombus. Right ventricular systolic function is reduced. A 29 mm Langford Leyda S3 bioprosthetic aortic valve appears well seated   with normal Doppler values. Inadequate tricuspid valve regurgitation to estimate systolic pulmonary   artery pressure. There is a moderate left pleural effusion noted.     Coronary angiogram 1/14/2022:  1. Left heart catheterization  2. Selective left and right coronary angiogram  3. PCI of the RCA with PATRICIA  Procedure Findings:  1. 99% mid RCA  2. 60-70% CIRC and DIAG  3. Elevated left heart hemodynamics              ~LVEDP 30       Details:              Igor Ann was brought to the cardiac catheterization lab in a fasting state after informed consent was obtained. If the patient was able to provide written consent, it was obtained.              NYX patient's vitals were monitored through out the procedure. The patient was sedated using the appropriate levels of sedation and ASA guidelines.              The appropriate access site area was prepped and drapped in a sterile fashion. The area was anesthetized with 2% lidocaine. Using the modified Seldinger technique, an arterial sheath was introduced into the arterial access site using a single anterior wall puncture. The sheath was flushed and prepped in usual fashion.              Catheters used during the procedure included 5 Sami TIG 4.0 catheter. The catheters were advanced and removed over a .035\" wire, into the appropriate positions. Multiple angiographic views were obtained. An LV gram was not obtained.              ~The interventional portion of the procedure was completed utilizing a multipurpose 6 [place] guide.  Multipurpose guide was advanced into the right coronary ostium.  A gentleman therapy aspirin, Plavix, Angiomax was initiated.  A BMW wire was advanced into the distal right coronary artery.  The lesion was initially predilated with a 2.75 x 15 mm Euphora balloon followed by a 3.5 mm x 20 mm Euphora balloon and subsequently stented with a resolute Willam 4.0 mm x 38 mm balloon.  We did experience a no reflow phenomenon.  We separately done.  A twin pass catheter and then did local drug delivery with 200 mcg of nitroprusside and 200 mcgnitroglycerin.  Provided restoration of SCARLETT-3 flow. Findings:  1.  Left main coronary artery was normal. It gave off the left anterior descending artery and left circumflex. 2. Left anterior descending artery has 60% severe atherosclerotic disease.  It was moderate in size. It gave off septal perforators and a moderate sized diagonal branch. The LAD covered the entire apex of the left ventricle. 3. Left circumflex has 60% severe atherosclerotic disease.  It was moderate in size. There was a moderate sized obtuse marginal branch. 4. Right coronary artery has 99% severe atherosclerotic disease. It was moderate in size and was the dominant artery. CONCLUSIONS:  1.  Apercutaneous coronary intervention of the right coronary artery utilizing a single resolute [place] drug-eluting stent  ASSESSMENT/RECOMMENDATIONS:  1.  Dual antiplatelet therapy  2.  Medical management of the remaining coronary disease     Echo 1/2022:   Limited only f/u for LVEF and RVF.   The left ventricular systolic function is mildly reduced with an ejection   fraction of 40-45 %.   There is hypokinesis of the apex and inferior walls.   There is a very small circumferential pericardial effusion noted.   No tamponade physiology.   The right ventricle is normal in size and function.     Lab Reviewed:     Renal Profile:  Lab Results   Component Value Date    CREATININE 3.9 06/03/2022    BUN 23 06/03/2022     06/03/2022    K 3.5 06/03/2022    CL 95 06/03/2022    CO2 24 06/03/2022     CBC:    Lab Results   Component Value Date    WBC 9.6 06/01/2022    RBC 2.87 06/01/2022    HGB 8.6 06/01/2022    HCT 26.6 06/01/2022    MCV 92.9 06/01/2022    RDW 16.3 06/01/2022     06/01/2022     BNP:    Lab Results   Component Value Date    PROBNP >70,000 05/29/2022    PROBNP 51,922 05/25/2022    PROBNP >70,000 05/09/2022    PROBNP >70,000 04/24/2022    PROBNP >70,000 04/18/2022     Fasting Lipid Panel:    Lab Results   Component Value Date    CHOL 167 03/18/2022    HDL 38 03/18/2022    TRIG 213 03/18/2022     Cardiac Enzymes:  CK/MbTroponin  Lab Results   Component Value Date    CKTOTAL 45 05/25/2022    TROPONINI 0.09 05/30/2022     PT/ INR   Lab Results   Component Value Date    INR 1.21 05/29/2022    INR 0.97 04/25/2022    INR 1.03 03/21/2022    PROTIME 15.1 05/29/2022    PROTIME 11.0 04/25/2022    PROTIME 11.6 03/21/2022     PTT No results found for: PTT   Lab Results   Component Value Date    MG 2.10 06/03/2022      Lab Results   Component Value Date    TSH 2.15 03/17/2022     All labs and imaging reviewed today    Assessment:  Elevated troponin: chronic elevation due to poor clearance  CAD: s/p PATRICIA RCA 1/14/2022; s/p PATRICIA LAD 2015  Acute on chronic systolic CHF: ongoing  Bicuspid AV/severe AS: s/p TAVR 10/2019  Ischemic cardiomyopathy: EF worse, EF 20-25% on echo 6/3/2022; EF 40-45% on echo 1/12/2022  Paroxysmal atrial fibrillation: stable   - [id number] score >2 on eliquis  PVD: s/p PTA/stent R external iliac 5/2019  Bradycardia/transient sinus pauses: noted 12/12/2020, no recurrence  HTN: stable  HLD: improved, LDL 86, statin and recheck  ESRD: on HD MWF; follows with Dr. Andriy Gonzales  DM: hgb a1c 6.6  Anemia  ZAINAB  History of CVA    Plan:   1. He has persistent chest pain and SOB despite medical therapy and increased fluid removal with dialysis. Stress test showed mainly fixed defects with moderate darrick-infarct ischemia. Will have interventionalist evaluate Monday for possible LHC given worsening EF, recent PCI and ongoing symptoms. 2. Nephrology lowering target weight  3. Continue plavix, toprol, entresto, isordil (increased yesterday)  4. Holding eliquis for PAF in case LHC performed tomorrow  5.  NPO at midnight for interventional cardiology evaluation    Gianni Braswell APRN-ILAN  [address]  [phone]

## 2022-06-05 NOTE — PROGRESS NOTES
06/04/22 2338   NIV Type   Equipment Type V60   Mode AVAPS   Mask Type Full face mask   Mask Size Large   Settings/Measurements   CPAP/EPAP 6 cmH2O   IPAP Min 18 cmH2O   IPAP Max 25 cmH2O   Vt (Set, mL) 700 mL   Rate Ordered 15   Resp 15   FiO2  30 %   Vt Exhaled 780 mL   Minute Volume 12 Liters   Mask Leak (lpm) 60 lpm   Comfort Level Good   Using Accessory Muscles No   Patient's Home Machine No

## 2022-06-05 NOTE — PLAN OF CARE
Problem: Discharge Planning  Goal: Discharge to home or other facility with appropriate resources  6/5/2022 0209 by Jonas Valadez RN  Outcome: Progressing  6/4/2022 1333 by Bard Malaika RN  Outcome: Progressing     Problem: Pain  Goal: Verbalizes/displays adequate comfort level or baseline comfort level  6/4/2022 1333 by Bard Malaika RN  Outcome: Progressing     Problem: Safety - Adult  Goal: Free from fall injury  6/5/2022 0209 by Jonas Valadez RN  Outcome: Progressing  6/4/2022 1333 by Bard Malaika RN  Outcome: Progressing     Problem: ABCDS Injury Assessment  Goal: Absence of physical injury  6/5/2022 0209 by Jonas Valadez RN  Outcome: Progressing  6/4/2022 1333 by Bard Malaika RN  Outcome: Progressing     Problem: Chronic Conditions and Co-morbidities  Goal: Patient's chronic conditions and co-morbidity symptoms are monitored and maintained or improved  6/5/2022 0209 by Jonas Valadez RN  Outcome: Progressing  6/4/2022 1333 by Bard Malaika RN  Outcome: Progressing     Problem: Nutrition Deficit:  Goal: Optimize nutritional status  6/4/2022 1333 by Bard Dai RN  Outcome: Progressing

## 2022-06-05 NOTE — PROGRESS NOTES
Hospitalist Progress Note      PCP: [address], [place]    Date of Admission: 5/29/2022    Chief Complaint: confusion at home       Subjective:  Ongoing chest pain, described as a substernal pressure. Worse when he ambulates around the room and gets dyspneic. Patient is understandably quite worried, overwhelmed. He seems very high risk for ongoing readmissions. Cardiology considering [facility] tomorrow.          Medications:  Reviewed    Infusion Medications    dextrose      sodium chloride       Scheduled Medications    isosorbide dinitrate  20 mg Oral TID    tiotropium-olodaterol  2 puff Inhalation Nightly    lactobacillus  1 capsule Oral Daily with breakfast    gabapentin  200 mg Oral TID    sacubitril-valsartan  1 tablet Oral BID    epoetin siddharth-epbx  10,000 Units IntraVENous Q MWF    insulin lispro  0-3 Units SubCUTAneous Nightly    insulin lispro  0-6 Units SubCUTAneous TID WC    mupirocin   Topical Daily    QUEtiapine  25 mg Oral Nightly    [Held by provider] apixaban  5 mg Oral BID    b complex-C-folic acid  1 mg Oral Daily    calcium acetate  1 capsule Oral TID WC    clopidogrel  75 mg Oral Daily    docusate sodium  100 mg Oral Daily    DULoxetine  30 mg Oral Daily    sennosides-docusate sodium  1 tablet Oral Daily    pravastatin  40 mg Oral Nightly    pantoprazole  40 mg Oral QAM AC    metoprolol succinate  50 mg Oral BID    sodium chloride flush  5-40 mL IntraVENous 2 times per day     PRN Meds: oxyCODONE-acetaminophen, perflutren lipid microspheres, glucose, dextrose bolus **OR** dextrose bolus, glucagon (rDNA), dextrose, albuterol sulfate HFA, calcium carbonate, cyclobenzaprine, ipratropium-albuterol, nitroGLYCERIN, mineral oil, oxyCODONE-acetaminophen, sodium chloride flush, sodium chloride, ondansetron **OR** ondansetron, polyethylene glycol, acetaminophen **OR** acetaminophen, hydrOXYzine HCl, traZODone      Intake/Output Summary (Last 24 hours) at 6/5/2022 1349  Last data filed at 6/5/2022 0931  Gross per 24 hour   Intake 1380 ml   Output --   Net 1380 ml       Physical Exam Performed:    BP (!) 120/59   Pulse 59   Temp 97.6 °F (36.4 °C) (Oral)   Resp 16   Ht 5' 6\" (1.676 m)   Wt 231 lb 11.2 oz (105.1 kg)   SpO2 92%   BMI 37.40 kg/m²       General appearance: No apparent distress, appears stated age and cooperative. HEENT: Pupils equal, round, and reactive to light. Conjunctivae/corneas clear. Neck: Supple, with full range of motion. No jugular venous distention. Trachea midline. Respiratory:  Normal respiratory effort. Clear to auscultation, bilaterally without Rales/Wheezes/Rhonchi. Diminished throughout. Cardiovascular: Regular rate and rhythm with normal S1/S2 without murmurs, rubs or gallops. Abdomen: Soft, non-tender, non-distended with normal bowel sounds. Musculoskeletal: No clubbing, cyanosis or edema bilaterally. Full range of motion without deformity. Skin: Skin color, texture, turgor normal.  No rashes or lesions. Neurologic:  Neurovascularly intact without any focal sensory/motor deficits. Cranial nerves: II-XII intact, grossly non-focal.  Psychiatric: Alert and oriented, thought content appropriate, normal insight. Anxious affect. Capillary Refill: Brisk,3 seconds, normal   Peripheral Pulses: +2 palpable, equal bilaterally       Labs:   No results for input(s): WBC, HGB, HCT, PLT in the last 72 hours. Recent Labs     06/03/22  0906   *   K 3.5   CL 95*   CO2 24   BUN 23*   CREATININE 3.9*   CALCIUM 9.0     No results for input(s): AST, ALT, BILIDIR, BILITOT, ALKPHOS in the last 72 hours. No results for input(s): INR in the last 72 hours. No results for input(s): Leila Troy in the last 72 hours.     Urinalysis:      Lab Results   Component Value Date    NITRU Negative 05/29/2022    WBCUA 10-20 05/29/2022    BACTERIA 3+ 05/29/2022    RBCUA 5-10 05/29/2022    BLOODU TRACE-INTACT 05/29/2022    SPECGRAV 1.015 05/29/2022    GLUCOSEU 100 05/29/2022       Radiology:  XR CHEST PORTABLE   Final Result   Stable left basilar pleural effusion. Left perihilar atelectasis, with mild improvement in aeration. NM Cardiac Stress Test Nuclear Imaging   Final Result      CT HEAD WO CONTRAST   Final Result   No acute intracranial abnormality. CT CHEST PULMONARY EMBOLISM W CONTRAST   Final Result   Negative for acute pulmonary embolus. Bilateral patchy areas of atelectasis or infiltrate in the lungs most   pronounced in the left lower lobe. Bilateral pleural effusions (left greater than right). Mediastinal adenopathy possibly reactive in nature. Recommend follow-up CT   chest in 1-3 months to confirm resolution unless clinically indicated sooner. XR CHEST PORTABLE   Final Result   1. Central predominant edema potentially of cardiac or noncardiogenic origin. Pneumonia could appear similar. 2. Increased left basilar airspace opacity due in part to passive atelectasis   given at least trace to small left pleural effusion. Underlying rounded   atelectasis is likely present given the prior CT appearance, and superimposed   pneumonia and aspiration are not excluded. 3. At least trace right pleural effusion. Assessment/Plan:    Active Hospital Problems    Diagnosis     Coronary artery disease of native artery of native heart with stable angina pectoris ([facility] [address] [I25.118]      Priority: High    Altered mental status [R41.82]      Priority: Medium    Hypoglycemia [E16.2]      Priority: Medium    History of transcatheter aortic valve replacement (TAVR) [Z95.2]      Priority: Medium    ESRD (end stage renal disease) ([facility] [address] [N18.6]     Elevated troponin [R77.8]        \"46 y.o. male  with multiple medical issueswho [facility]b/chest pain.   Pt states he has chronic sob and chest pain(has element of anxiety) and yesterday he developed inc'd sob that was fairly sudden onset and continued into the night. He also noted ongoing substernal chest pain, nonradiating that would last a few hours(would subside on its own with deep breathing). \"      HCAP, with sepsis present on arrival.  No guiding culture data. Completed course of empiric vanc and zosyn. Complicated by diarrhea, likely abx-induced. Patient has h/o c.diff. Diarrhea improved now that he is off abx. Probiotic. Pleural effusions, L>R. Likely due to VO from acute on chronic systolic and diastolic CHF, ESRD. F/u CXR. ESRD on HD. Nephrology removed fluid, lowered target weight, added extra ultrafiltration session on 6/2, and continued with MWF schedule thereafter. Chronic hypoxic respiratory failure. Baseline 3-4LNC. Chest pain, with known h/o CAD.  Appreciate cardiology input. Stress test showed mostly scar, some ischemia, similar to prior. TTE showed EF 20-25% with RWMAs (EF 40-45% in 1/2022).  Continue clopidogrel, statin, metoprolol, increased ISDN. Cardiology noted that they would consider LHC if his symptoms persisted after adequate fluid removal.  They are considering LHC 6/6.     DM2.  Complicated by hypoglycemia on admission, with acute metabolic encephalopathy.  Stopped insulin and his sugars were fine.  F/u with PCP.     HTN. ISDN, metoprolol. Cardiology started entresto. Cardiomyopathy. Likely ischemic. Metoprolol and entresto as above. Cardiology could consider an AICD in 90 days if no improvement.      PAF.  Apixaban (held for possible LHC), metoprolol.         DVT Prophylaxis: anticoagulation as above  Diet: ADULT ORAL NUTRITION SUPPLEMENT; Breakfast, Dinner; Low Calorie/High Protein Oral Supplement  Diet NPO  ADULT DIET; Regular; Low Sodium (2 gm); Low Potassium (Less than 3000 mg/day); Low Phosphorus (Less than 1000 mg); 2000 ml  Code Status: Full Code    PT/OT Eval Status: rec'd home PT/OT    Dispo - unclear at this point. High risk for ongoing readmissions.   Here is what we need prior to discharge:    - nephrology input as to whether his dry weight can be lowered any further.  - cardiology input as to whether they plan to perform a LHC on this admission. Perhaps ready to go home as early as 6/6 PM if nephrology thinks his dry weight is as low as it is going to get, and if LHC is either stable or does not need to be performed.           Marsha Faustin MD

## 2022-06-05 NOTE — PROGRESS NOTES
06/04/22 2120   NIV Type   Equipment Type V60   Mode AVAPS   Mask Type Full face mask   Mask Size Large   Settings/Measurements   CPAP/EPAP 6 cmH2O   IPAP Min 18 cmH2O   IPAP Max 25 cmH2O   Vt (Set, mL) 700 mL   Rate Ordered 15   Resp 16   FiO2  30 %   Vt Exhaled 841 mL   Minute Volume 16 Liters   Mask Leak (lpm) 18 lpm   Comfort Level Good   Using Accessory Muscles No   SpO2 99   Patient's Home Machine No

## 2022-06-05 NOTE — PROGRESS NOTES
[facility] Progress Note           Subjective/   45y.o. year old male who we are seeing in consultation for ESKD on HD. HPI:  The patient was seen and examined; he feels well today with no CP, SOB, nausea or vomiting. ROS: No fever or chills. Social: No family at bedside. Objective/   GEN:  Chronically ill, BP (!) 120/59   Pulse 59   Temp 97.6 °F (36.4 °C) (Oral)   Resp 16   Ht 5' 6\" (1.676 m)   Wt 231 lb 11.2 oz (105.1 kg)   SpO2 92%   BMI 37.40 kg/m²      HEENT: non-icteric, no JVD  CV: S1, S2 without m/r/g; no LE edema  RESP: CTA B without w/r/r; breathing wnl  ABD: +bs, soft, nt, no hsm  SKIN: warm, no rashes  ACCESS: Left IJ TDC    Data/  No results for input(s): WBC, HGB, HCT, MCV, PLT in the last 72 hours. Recent Labs     06/03/22  0906   *   K 3.5   CL 95*   CO2 24   GLUCOSE 126*   MG 2.10   BUN 23*   CREATININE 3.9*   LABGLOM 16*   GFRAA 20*       Assessment/     # End stage kidney disease - on HD Mon-Wed-Fri     # Acute on chronic respiratory failure r/o sepsis - started on antibiotics     # sCHF - EF ~40%, decompensated     # Hypertension - poorly controlled     # Anemia of chronic disease -        Plan/     - HD tomorrow per MWF schedule with UF as tolerated.   - EDW has been reduced  - DAVON 10K qHD

## 2022-06-05 NOTE — PROGRESS NOTES
06/05/22 0804   NIV Type   Equipment Type V60   Mode AVAPS   Mask Type Full face mask   Mask Size Large   Settings/Measurements   CPAP/EPAP 6 cmH2O   IPAP Min 18 cmH2O   IPAP Max 25 cmH2O   Vt (Set, mL) 700 mL   Rate Ordered 15   Resp 15   FiO2  30 %   Vt Exhaled 480 mL   Minute Volume 8 Liters   Mask Leak (lpm) 9 lpm   Comfort Level Good   Using Accessory Muscles No   Alarm Settings   Alarms On Y   Low Pressure 6 cmH2O   High Pressure  30 cmH2O   Delay Alarm 20 sec(s)   RR Low  6   RR High 40 br/min   Oxygen Therapy/Pulse Ox   O2 Therapy Oxygen   $Oxygen $Daily Charge   O2 Device PAP (positive airway pressure)   SpO2 98 %   $Pulse Oximeter $Spot check (multiple/continuous)

## 2022-06-06 LAB
ANION GAP SERPL CALCULATED.3IONS-SCNC: 17 MMOL/L (ref 3–16)
BUN BLDV-MCNC: 64 MG/DL (ref 7–20)
CALCIUM SERPL-MCNC: 8.6 MG/DL (ref 8.3–10.6)
CHLORIDE BLD-SCNC: 83 MMOL/L (ref 99–110)
CO2: 23 MMOL/L (ref 21–32)
CREAT SERPL-MCNC: 8.1 MG/DL (ref 0.9–1.3)
GFR AFRICAN AMERICAN: 8
GFR NON-AFRICAN AMERICAN: 7
GLUCOSE BLD-MCNC: 129 MG/DL (ref 70–99)
GLUCOSE BLD-MCNC: 150 MG/DL (ref 70–99)
GLUCOSE BLD-MCNC: 190 MG/DL (ref 70–99)
GLUCOSE BLD-MCNC: 206 MG/DL (ref 70–99)
HCT VFR BLD CALC: 29.6 % (ref 40.5–52.5)
HEMOGLOBIN: 10 G/DL (ref 13.5–17.5)
IRON SATURATION: 16 % (ref 20–50)
IRON: 35 UG/DL (ref 59–158)
MCH RBC QN AUTO: 30.2 PG (ref 26–34)
MCHC RBC AUTO-ENTMCNC: 33.8 G/DL (ref 31–36)
MCV RBC AUTO: 89.4 FL (ref 80–100)
PDW BLD-RTO: 16.5 % (ref 12.4–15.4)
PERFORMED ON: ABNORMAL
PLATELET # BLD: 256 K/UL (ref 135–450)
PMV BLD AUTO: 7.8 FL (ref 5–10.5)
POTASSIUM REFLEX MAGNESIUM: 4.5 MMOL/L (ref 3.5–5.1)
RBC # BLD: 3.31 M/UL (ref 4.2–5.9)
SODIUM BLD-SCNC: 123 MMOL/L (ref 136–145)
TOTAL IRON BINDING CAPACITY: 219 UG/DL (ref 260–445)
TROPONIN: 0.09 NG/ML
WBC # BLD: 10.1 K/UL (ref 4–11)

## 2022-06-06 PROCEDURE — 6370000000 HC RX 637 (ALT 250 FOR IP): Performed by: NURSE PRACTITIONER

## 2022-06-06 PROCEDURE — 1200000000 HC SEMI PRIVATE

## 2022-06-06 PROCEDURE — 6360000002 HC RX W HCPCS

## 2022-06-06 PROCEDURE — 36415 COLL VENOUS BLD VENIPUNCTURE: CPT

## 2022-06-06 PROCEDURE — 2700000000 HC OXYGEN THERAPY PER DAY

## 2022-06-06 PROCEDURE — 6370000000 HC RX 637 (ALT 250 FOR IP): Performed by: INTERNAL MEDICINE

## 2022-06-06 PROCEDURE — 83550 IRON BINDING TEST: CPT

## 2022-06-06 PROCEDURE — 2500000003 HC RX 250 WO HCPCS: Performed by: INTERNAL MEDICINE

## 2022-06-06 PROCEDURE — 90935 HEMODIALYSIS ONE EVALUATION: CPT

## 2022-06-06 PROCEDURE — 85027 COMPLETE CBC AUTOMATED: CPT

## 2022-06-06 PROCEDURE — 83540 ASSAY OF IRON: CPT

## 2022-06-06 PROCEDURE — 2580000003 HC RX 258: Performed by: INTERNAL MEDICINE

## 2022-06-06 PROCEDURE — 84484 ASSAY OF TROPONIN QUANT: CPT

## 2022-06-06 PROCEDURE — 94761 N-INVAS EAR/PLS OXIMETRY MLT: CPT

## 2022-06-06 PROCEDURE — 80048 BASIC METABOLIC PNL TOTAL CA: CPT

## 2022-06-06 PROCEDURE — 94640 AIRWAY INHALATION TREATMENT: CPT

## 2022-06-06 PROCEDURE — 94660 CPAP INITIATION&MGMT: CPT

## 2022-06-06 PROCEDURE — 99233 SBSQ HOSP IP/OBS HIGH 50: CPT | Performed by: INTERNAL MEDICINE

## 2022-06-06 RX ORDER — HEPARIN SODIUM 1000 [USP'U]/ML
INJECTION, SOLUTION INTRAVENOUS; SUBCUTANEOUS
Status: COMPLETED
Start: 2022-06-06 | End: 2022-06-06

## 2022-06-06 RX ADMIN — CYCLOBENZAPRINE 10 MG: 10 TABLET, FILM COATED ORAL at 13:45

## 2022-06-06 RX ADMIN — HEPARIN SODIUM 4200 UNITS: 1000 INJECTION INTRAVENOUS; SUBCUTANEOUS at 12:23

## 2022-06-06 RX ADMIN — TRAZODONE HYDROCHLORIDE 50 MG: 50 TABLET ORAL at 20:04

## 2022-06-06 RX ADMIN — CALCIUM ACETATE 667 MG: 667 CAPSULE ORAL at 17:59

## 2022-06-06 RX ADMIN — SACUBITRIL AND VALSARTAN 1 TABLET: 24; 26 TABLET, FILM COATED ORAL at 20:10

## 2022-06-06 RX ADMIN — DOCUSATE SODIUM 100 MG: 100 CAPSULE, LIQUID FILLED ORAL at 13:38

## 2022-06-06 RX ADMIN — ISOSORBIDE DINITRATE 20 MG: 20 TABLET ORAL at 13:38

## 2022-06-06 RX ADMIN — METOPROLOL SUCCINATE 50 MG: 50 TABLET, EXTENDED RELEASE ORAL at 20:03

## 2022-06-06 RX ADMIN — NEPHROCAP 1 MG: 1 CAP ORAL at 13:41

## 2022-06-06 RX ADMIN — DULOXETINE HYDROCHLORIDE 30 MG: 30 CAPSULE, DELAYED RELEASE ORAL at 13:38

## 2022-06-06 RX ADMIN — SACUBITRIL AND VALSARTAN 1 TABLET: 24; 26 TABLET, FILM COATED ORAL at 13:38

## 2022-06-06 RX ADMIN — OXYCODONE AND ACETAMINOPHEN 1 TABLET: 7.5; 325 TABLET ORAL at 20:03

## 2022-06-06 RX ADMIN — CYCLOBENZAPRINE 10 MG: 10 TABLET, FILM COATED ORAL at 21:52

## 2022-06-06 RX ADMIN — QUETIAPINE FUMARATE 25 MG: 25 TABLET ORAL at 20:04

## 2022-06-06 RX ADMIN — EPOETIN ALFA-EPBX 10000 UNITS: 10000 INJECTION, SOLUTION INTRAVENOUS; SUBCUTANEOUS at 12:19

## 2022-06-06 RX ADMIN — OXYCODONE AND ACETAMINOPHEN 1 TABLET: 7.5; 325 TABLET ORAL at 05:44

## 2022-06-06 RX ADMIN — OXYCODONE AND ACETAMINOPHEN 1 TABLET: 7.5; 325 TABLET ORAL at 13:45

## 2022-06-06 RX ADMIN — CLOPIDOGREL BISULFATE 75 MG: 75 TABLET ORAL at 13:39

## 2022-06-06 RX ADMIN — GABAPENTIN 200 MG: 100 CAPSULE ORAL at 13:38

## 2022-06-06 RX ADMIN — GABAPENTIN 200 MG: 100 CAPSULE ORAL at 20:03

## 2022-06-06 RX ADMIN — DOCUSATE SODIUM 50 MG AND SENNOSIDES 8.6 MG 1 TABLET: 8.6; 5 TABLET, FILM COATED ORAL at 13:38

## 2022-06-06 RX ADMIN — PRAVASTATIN SODIUM 40 MG: 40 TABLET ORAL at 20:04

## 2022-06-06 RX ADMIN — SODIUM CHLORIDE, PRESERVATIVE FREE 10 ML: 5 INJECTION INTRAVENOUS at 20:04

## 2022-06-06 RX ADMIN — Medication 1 CAPSULE: at 13:41

## 2022-06-06 RX ADMIN — CALCIUM ACETATE 667 MG: 667 CAPSULE ORAL at 13:38

## 2022-06-06 RX ADMIN — ISOSORBIDE DINITRATE 20 MG: 20 TABLET ORAL at 20:03

## 2022-06-06 ASSESSMENT — PAIN SCALES - GENERAL
PAINLEVEL_OUTOF10: 9
PAINLEVEL_OUTOF10: 0
PAINLEVEL_OUTOF10: 8

## 2022-06-06 ASSESSMENT — PAIN DESCRIPTION - PAIN TYPE: TYPE: CHRONIC PAIN

## 2022-06-06 ASSESSMENT — PAIN DESCRIPTION - LOCATION: LOCATION: BACK

## 2022-06-06 NOTE — PROGRESS NOTES
HD RN called for pt. Per Cardio note poss cath today.  Call placed to [facility], \"pt is not on schedule yet send pt to HD\"

## 2022-06-06 NOTE — PROGRESS NOTES
Physical Therapy/Occupational Therapy    Attempted to see pt for follow up treatment. Pt currently off floor for dialysis. Will continues to attempt as schedule allows.     Srinath Sims, PT, DPT   Bob Allison, OTR/L

## 2022-06-06 NOTE — PLAN OF CARE
Problem: Discharge Planning  Goal: Discharge to home or other facility with appropriate resources  Outcome: Progressing     Problem: Pain  Goal: Verbalizes/displays adequate comfort level or baseline comfort level  Outcome: Progressing     Problem: Safety - Adult  Goal: Free from fall injury  Outcome: Progressing     Problem: ABCDS Injury Assessment  Goal: Absence of physical injury  Outcome: Progressing     Problem: Chronic Conditions and Co-morbidities  Goal: Patient's chronic conditions and co-morbidity symptoms are monitored and maintained or improved  Outcome: Progressing     Problem: Nutrition Deficit:  Goal: Optimize nutritional status  Outcome: Progressing

## 2022-06-06 NOTE — PROGRESS NOTES
Hospitalist Progress Note      PCP: Alea Aguilar MD    Date of Admission: 5/29/2022    Chief Complaint: confusion at home    Subjective: no new c/o.   Seen in HD 6 June      Medications:  Reviewed    Infusion Medications    dextrose      sodium chloride       Scheduled Medications    isosorbide dinitrate  20 mg Oral TID    tiotropium-olodaterol  2 puff Inhalation Nightly    lactobacillus  1 capsule Oral Daily with breakfast    gabapentin  200 mg Oral TID    sacubitril-valsartan  1 tablet Oral BID    epoetin siddharth-epbx  10,000 Units IntraVENous Q MWF    insulin lispro  0-3 Units SubCUTAneous Nightly    insulin lispro  0-6 Units SubCUTAneous TID WC    mupirocin   Topical Daily    QUEtiapine  25 mg Oral Nightly    [Held by provider] apixaban  5 mg Oral BID    b complex-C-folic acid  1 mg Oral Daily    calcium acetate  1 capsule Oral TID WC    clopidogrel  75 mg Oral Daily    docusate sodium  100 mg Oral Daily    DULoxetine  30 mg Oral Daily    sennosides-docusate sodium  1 tablet Oral Daily    pravastatin  40 mg Oral Nightly    pantoprazole  40 mg Oral QAM AC    metoprolol succinate  50 mg Oral BID    sodium chloride flush  5-40 mL IntraVENous 2 times per day     PRN Meds: oxyCODONE-acetaminophen, perflutren lipid microspheres, glucose, dextrose bolus **OR** dextrose bolus, glucagon (rDNA), dextrose, albuterol sulfate HFA, calcium carbonate, cyclobenzaprine, ipratropium-albuterol, nitroGLYCERIN, mineral oil, oxyCODONE-acetaminophen, sodium chloride flush, sodium chloride, ondansetron **OR** ondansetron, polyethylene glycol, acetaminophen **OR** acetaminophen, hydrOXYzine HCl, traZODone      Intake/Output Summary (Last 24 hours) at 6/6/2022 1230  Last data filed at 6/6/2022 0919  Gross per 24 hour   Intake 720 ml   Output --   Net 720 ml       Physical Exam Performed:    /85   Pulse 61   Temp 98.3 °F (36.8 °C)   Resp 16   Ht 5' 6\" (1.676 m)   Wt 250 lb 3.6 oz (113.5 kg)   SpO2 99%   BMI 40.39 kg/m²     General appearance: No apparent distress, appears stated age and cooperative. HEENT: Pupils equal, round, and reactive to light. Conjunctivae/corneas clear. Neck: Supple, with full range of motion. No jugular venous distention. Trachea midline. Respiratory:  Normal respiratory effort. Clear to auscultation, bilaterally without Rales/Wheezes/Rhonchi. Cardiovascular: Regular rate and rhythm with normal S1/S2 without murmurs, rubs or gallops. Abdomen: Soft, non-tender, non-distended with normal bowel sounds. Musculoskeletal: No clubbing, cyanosis or edema bilaterally. Full range of motion without deformity. Skin: Skin color, texture, turgor normal.  No rashes or lesions. Neurologic:  Neurovascularly intact without any focal sensory/motor deficits. Cranial nerves: II-XII intact, grossly non-focal.  Psychiatric: Alert and oriented, thought content appropriate, normal insight  Capillary Refill: Brisk,< 3 seconds   Peripheral Pulses: +2 palpable, equal bilaterally       Labs:   Recent Labs     06/06/22 0629   WBC 10.1   HGB 10.0*   HCT 29.6*        Recent Labs     06/06/22 0629   *   K 4.5   CL 83*   CO2 23   BUN 64*   CREATININE 8.1*   CALCIUM 8.6     No results for input(s): AST, ALT, BILIDIR, BILITOT, ALKPHOS in the last 72 hours. No results for input(s): INR in the last 72 hours.   Recent Labs     06/06/22 0629   TROPONINI 0.09*       Urinalysis:      Lab Results   Component Value Date    NITRU Negative 05/29/2022    WBCUA 10-20 05/29/2022    BACTERIA 3+ 05/29/2022    RBCUA 5-10 05/29/2022    BLOODU TRACE-INTACT 05/29/2022    SPECGRAV 1.015 05/29/2022    GLUCOSEU 100 05/29/2022       Consults:    IP CONSULT TO NEPHROLOGY  IP CONSULT TO CARDIOLOGY  IP CONSULT TO PHARMACY  IP CONSULT TO CASE MANAGEMENT  IP CONSULT TO HEART FAILURE NURSE/COORDINATOR  IP CONSULT TO DIETITIAN      Assessment/Plan:    Active Hospital Problems    Diagnosis     Coronary artery disease of native artery of native heart with stable angina pectoris ([facility] [address] [I25.118]      Priority: High    Altered mental status [R41.82]      Priority: Medium    Hypoglycemia [E16.2]      Priority: Medium    History of transcatheter aortic valve replacement (TAVR) [Z95.2]      Priority: Medium    ESRD (end stage renal disease) (HCC) [N18.6]     Elevated troponin [R77.8]     PNA (pneumonia) [J18.9]           HCAP, with sepsis present on arrival.  No guiding culture data. Completed course of empiric vanc and zosyn. Complicated by diarrhea, likely abx-induced. Patient has h/o c.diff. Diarrhea improved now that he is off abx. Probiotic.       Pleural effusions, L>R. Likely due to VO from acute on chronic systolic and diastolic CHF, ESRD. F/u CXR.     ESRD on HD. Nephrology removed fluid, lowered target weight, added extra ultrafiltration session on 6/2, and continued with MWF schedule thereafter.       Chronic hypoxic respiratory failure. Baseline 3-4LNC.     Chest pain, with known h/o CAD.   Appreciate cardiology input. Stress test showed mostly scar, some ischemia, similar to prior. TTE showed EF 20-25% with RWMAs (EF 40-45% in 1/2022).  Continue clopidogrel, statin, metoprolol, increased ISDN. Cardiology noted that they would consider LHC if his symptoms persisted after adequate fluid removal.  They are considering LHC 6/6.     DM2.  Complicated by hypoglycemia on admission, with acute metabolic encephalopathy.  Stopped insulin and his sugars were fine.  F/u with PCP.     HTN.  ISDN, metoprolol.  Cardiology started entresto.     Cardiomyopathy. Likely ischemic. Metoprolol and entresto as above. Cardiology could consider an AICD in 90 days if no improvement.      PAF.  Apixaban (held for possible LHC), metoprolol.             DVT Prophylaxis: Eliquis - held, IPC    Recent Labs     06/06/22  0629        Diet: ADULT DIET;  Regular  Diet NPO Exceptions are: Sips of Water with Meds  Code Status: Full Code      PT/OT Eval Status: seen w/ recs for home PT/OT. Dispo - Patient is likely to remain in-house at least until Tues/Wed 7/8 June pending clinical course and subspecialty recs - unclear at this point. High risk for ongoing readmissions.   Here is what we need prior to discharge:     - nephrology input as to whether his dry weight can be lowered any further.  - cardiology input as to whether they plan to perform a LHC on this admission.     Perhaps ready to go home as early as 6/6 PM if nephrology thinks his dry weight is as low as it is going to get, and if LHC is either stable or does not need to be performed.        Courtney Armas MD

## 2022-06-06 NOTE — CONSULTS
Nutrition Education  Consult received for CHF diet education. Provided pt with written and verbal instruction on HF nutrition therapy. Pt does not add salt to food and usually follows a low sodium diet at home. HE receives pre-made meals that are low sodium and eats 2 daily. He reports that sometimes he drinks greater than 64 oz of fluids daily and sometimes he drinks less than 64 oz. He reports he will monitor his fluid intake to reduce swelling. Pt does not weight self daily and needs to buy scale at home. Discussed low sodium diet, daily weights, and fluid restriction. Pt voiced understanding. Time spent: 10 minutes    · Educated on CHF diet education  · Learners: Patient  · Readiness: Acceptance  · Method: Explanation and Handout  · Response: Verbalizes Understanding and Needs Reinforcement  · Contact name and number provided.     Ferny Manzanares MS, RD, LD  Contact Number: Office: [phone]; [address]: [id number]

## 2022-06-06 NOTE — PROGRESS NOTES
[facility]   Daily Cardiovascular Progress Note    Admit Date: 5/29/2022    Chief complaint: Shortness of breath and chest pain  HPI:     Pt continues to have cp, sob, but denies dizziness or syncope. Medications/Labs all Reviewed:  Patient Active Problem List   Diagnosis    Sepsis without acute organ dysfunction ([facility] [facility]    CHF (congestive heart failure) (HCC)    Asthma-COPD overlap syndrome ([facility]    Coronary artery disease of native artery of native heart with stable angina pectoris ([address]    PVD (peripheral vascular disease) ([facility]    Bicuspid aortic valve    Bilateral hilar adenopathy syndrome    Claudication in peripheral vascular disease ([facility] [facility]    HTN (hypertension), benign    Diabetic neuropathy (HCC)    Type 2 diabetes, uncontrolled, with neuropathy ([facility] [facility]    Passive smoke exposure    Depression with anxiety    PNA (pneumonia)    Obesity (BMI 30-39. 9)    ZAINAB on CPAP    Degeneration of lumbar or lumbosacral intervertebral disc    Lumbar radiculopathy    Lumbosacral spondylosis without myelopathy    Biliary colic    Symptomatic cholelithiasis    Gastroparesis due to DM ([facility])    Elevated troponin    Angina, class IV ([facility])    Dyspnea    Dyslipidemia    Acute on chronic combined systolic and diastolic heart failure (HCC)    Ischemic cardiomyopathy    Tobacco abuse    CVA (cerebral vascular accident) ([facility] [address] [facility]    History of CVA (cerebrovascular accident)    Type 2 diabetes mellitus without complication, without long-term current use of insulin (HCC)    ZAINAB (obstructive sleep apnea)    Pleural effusion    Chronic anemia    Nonrheumatic aortic valve stenosis    Mucus plugging of bronchi    Hemodialysis-associated hypotension    ESRD (end stage renal disease) (HCC)    Hypotension due to drugs    Acute diastolic CHF (congestive heart failure) ([facility])    Neuromuscular disorder ([facility])    Renovascular hypertension    Mixed hyperlipidemia    Cigarette nicotine dependence in remission    Pulmonary edema    Fluid overload    Anemia of chronic disease    SOB (shortness of breath) on exertion    Steal syndrome of dialysis vascular access ([facility])    Chronic, continuous use of opioids    Chronic bronchitis ([facility])    Nasal congestion    Hypercholesteremia    Bradycardia    History of transcatheter aortic valve replacement (TAVR)    Syncope and collapse    PAF (paroxysmal atrial fibrillation) ([facility])    Bilateral leg weakness    GBS (Guillain-[place] syndrome) ([facility])    Sinus pause    Weakness of both lower extremities    Sinus bradycardia    Ataxia    Peripheral vascular occlusive disease ([facility])    Cellulitis of right foot    Iliac artery occlusion, right ([facility])    Cellulitis and abscess of hand    Type 2 diabetes mellitus with hyperglycemia ([facility])    Acute encephalopathy    Acute hypoxemic respiratory failure ([facility])    Acute CVA (cerebrovascular accident) ([facility] [address] [facility]    Speech problem    Urinary tract infection with hematuria    Respiratory failure with hypoxia ([facility])    Acute respiratory failure with hypercapnia ([facility])    Acute pulmonary edema ([facility])    Grade II diastolic dysfunction    Shock circulatory ([facility])    Smoker    Normocytic normochromic anemia    NSTEMI (non-ST elevated myocardial infarction) ([facility])    SIRS (systemic inflammatory response syndrome) ([facility])    Atrial flutter ([facility])    Liberty-rectal abscess    Somnolence    Pulmonary edema with diastolic CHF, NYHA class 3 ([facility])    Respiratory failure ([address]    Former smoker    Cardiomyopathy ([facility] [address]    Morbid obesity with BMI of 40.0-44.9, adult ([facility])    Hypoglycemia    Altered mental status       Medications:  isosorbide dinitrate (ISORDIL) tablet 20 mg, TID  tiotropium-olodaterol (STIOLTO) 2.5-2.5 MCG/ACT inhaler 2 puff, Nightly  lactobacillus (CULTURELLE) capsule 1 capsule, Daily with breakfast  gabapentin (NEURONTIN) capsule 200 mg, TID  oxyCODONE-acetaminophen (PERCOCET) 7.5-325 MG per tablet 1 tablet, Q6H PRN  sacubitril-valsartan (ENTRESTO) 24-26 MG per tablet 1 tablet, BID  perflutren lipid microspheres (DEFINITY) injection 1.65 mg, ONCE PRN  epoetin siddharth-epbx (RETACRIT) injection 10,000 Units, Q MWF  glucose chewable tablet 16 g, PRN  dextrose bolus 10% 125 mL, PRN   Or  dextrose bolus 10% 250 mL, PRN  glucagon (rDNA) injection 1 mg, PRN  dextrose 5 % solution, PRN  insulin lispro (HUMALOG) injection vial 0-3 Units, Nightly  insulin lispro (HUMALOG) injection vial 0-6 Units, TID WC  mupirocin (BACTROBAN) 2 % ointment, Daily  QUEtiapine (SEROQUEL) tablet 25 mg, Nightly  albuterol sulfate  (90 Base) MCG/ACT inhaler 2 puff, Q6H PRN  [Held by provider] apixaban (ELIQUIS) tablet 5 mg, BID  b complex-C-folic acid (NEPHROCAPS) capsule 1 mg, Daily  calcium acetate (PHOSLO) capsule 667 mg, TID WC  clopidogrel (PLAVIX) tablet 75 mg, Daily  calcium carbonate (TUMS) chewable tablet 500 mg, TID PRN  cyclobenzaprine (FLEXERIL) tablet 10 mg, TID PRN  docusate sodium (COLACE) capsule 100 mg, Daily  DULoxetine (CYMBALTA) extended release capsule 30 mg, Daily  ipratropium-albuterol (DUONEB) nebulizer solution 3 mL, Q6H PRN  sennosides-docusate sodium (SENOKOT-S) 8.6-50 MG tablet 1 tablet, Daily  pravastatin (PRAVACHOL) tablet 40 mg, Nightly  pantoprazole (PROTONIX) tablet 40 mg, QAM AC  metoprolol succinate (TOPROL XL) extended release tablet 50 mg, BID  nitroGLYCERIN (NITROSTAT) SL tablet 0.4 mg, Q5 Min PRN  mineral oil liquid 30 mL, Daily PRN  oxyCODONE-acetaminophen (PERCOCET) 7.5-325 MG per tablet 1 tablet, Q6H PRN  sodium chloride flush 0.9 % injection 5-40 mL, 2 times per day  sodium chloride flush 0.9 % injection 5-40 mL, PRN  0.9 % sodium chloride infusion, PRN  ondansetron (ZOFRAN-ODT) disintegrating tablet 4 mg, Q8H PRN   Or  ondansetron (ZOFRAN) injection 4 mg, Q6H PRN  polyethylene glycol (GLYCOLAX) packet 17 g, Daily PRN  acetaminophen (TYLENOL) tablet 650 mg, Q6H PRN   Or  acetaminophen (TYLENOL) suppository 650 mg, Q6H PRN  hydrOXYzine (ATARAX) tablet 10 mg, TID PRN  traZODone (DESYREL) tablet 50 mg, Nightly PRN           PHYSICAL EXAM   /63   Pulse 59   Temp 98.4 °F (36.9 °C) (Oral)   Resp 18   Ht 5' 6\" (1.676 m)   Wt 231 lb 11.2 oz (105.1 kg)   SpO2 99%   BMI 37.40 kg/m²    Vitals:    06/05/22 2321 06/05/22 2328 06/06/22 0335 06/06/22 0729   BP: 130/78   131/63   Pulse: 64   59   Resp: 18 18 18 18   Temp: 97.6 °F (36.4 °C)   98.4 °F (36.9 °C)   TempSrc: Axillary   Oral   SpO2: 98% 98% 98% 99%   Weight:       Height:             Intake/Output Summary (Last 24 hours) at 6/6/2022 0950  Last data filed at 6/6/2022 0919  Gross per 24 hour   Intake 720 ml   Output --   Net 720 ml     Wt Readings from Last 3 Encounters:   06/04/22 231 lb 11.2 oz (105.1 kg)   05/25/22 245 lb (111.1 kg)   04/27/22 245 lb 13 oz (111.5 kg)         Gen: Patient in NAD, resting comfortably, lying flat, not quite able to speak in full sentences  Neck: No JVD or bruits, however difficult to evaluate due to obesity  Respiratory: Bilateral rhonchi  Chest: normal without deformity, there is a dialysis access port in the left chest   Cardiovascular:RRR, S1S2, distant heart sounds, there is a +2 right radial pulse  Abdomen: Soft, NTND, Normal BS  Extremities: Trace to 1+ bilateral lower extremity edema  Neurological/Psychiatric: AxO x4, No gross motors/sensory deficits  Skin:  Warm and dry      Labs:  CBC:   Recent Labs     06/06/22  0629   WBC 10.1   HGB 10.0*   HCT 29.6*   MCV 89.4        BMP:   Recent Labs     06/06/22  0629   *   K 4.5   CL 83*   CO2 23   BUN 64*   CREATININE 8.1*     MG:  No results for input(s): MG in the last 72 hours. PT/INR: No results for input(s): PROTIME, INR in the last 72 hours. APTT: No results for input(s): APTT in the last 72 hours.   Cardiac Enzymes:   Recent Labs     06/06/22  0629   TROPONINI 0.09*       Cardiac Studies:    Echo    Summary   Definity® used for myocardial border enhancement. Left ventricular systolic function is severely reduced with a visually   estimated ejection fraction of 20-25 %. EF estimated by Jasmine's method at 24 %. The left ventricle is mildly dilated in size with normal wall thickness. Severe global hypokinesis with regional variation. Grade I diastolic dysfunction with normal LV pressure. There is no evidence of a left ventricular thrombus. Right ventricular systolic function is reduced. A 29 mm Langford Leyda S3 bioprosthetic aortic valve appears well seated   with normal Doppler values. Inadequate tricuspid valve regurgitation to estimate systolic pulmonary   artery pressure. There is a moderate left pleural effusion noted. Stress test      Summary    Severe LV dysfunction EF 26%    Global hypokinesis with regional variation, more pronounced in inferolateral    wall and apex    Large mainly fixed defect in inferior wall, extends to portions of lateral    wall and apex with moderate darrick-infarct ischemia        Overall, this would be considered an abnormal, higher risk, study         I have reviewed labs and imaging/xray/diagnostic testing in this note. Assessment and Plan       Patient Active Problem List   Diagnosis    Sepsis without acute organ dysfunction ([address]    CHF (congestive heart failure) (HCC)    Asthma-COPD overlap syndrome ([address]    Coronary artery disease of native artery of native heart with stable angina pectoris ([address]    PVD (peripheral vascular disease) ([address]    Bicuspid aortic valve    Bilateral hilar adenopathy syndrome    Claudication in peripheral vascular disease ([address]    HTN (hypertension), benign    Diabetic neuropathy (HCC)    Type 2 diabetes, uncontrolled, with neuropathy ([address]    Passive smoke exposure    Depression with anxiety    PNA (pneumonia)    Obesity (BMI 30-39. 9)    ZAINAB on CPAP    Degeneration of lumbar or lumbosacral intervertebral disc    Lumbar radiculopathy    Lumbosacral spondylosis without myelopathy    Biliary colic    Symptomatic cholelithiasis    Gastroparesis due to DM ([facility])    Elevated troponin    Angina, class IV ([facility])    Dyspnea    Dyslipidemia    Acute on chronic combined systolic and diastolic heart failure ([facility])    Ischemic cardiomyopathy    Tobacco abuse    CVA (cerebral vascular accident) ([facility]    History of CVA (cerebrovascular accident)    Type 2 diabetes mellitus without complication, without long-term current use of insulin ([facility])    ZAINAB (obstructive sleep apnea)    Pleural effusion    Chronic anemia    Nonrheumatic aortic valve stenosis    Mucus plugging of bronchi    Hemodialysis-associated hypotension    ESRD (end stage renal disease) ([facility])    Hypotension due to drugs    Acute diastolic CHF (congestive heart failure) ([facility])    Neuromuscular disorder ([facility])    Renovascular hypertension    Mixed hyperlipidemia    Cigarette nicotine dependence in remission    Pulmonary edema    Fluid overload    Anemia of chronic disease    SOB (shortness of breath) on exertion    Steal syndrome of dialysis vascular access ([facility])    Chronic, continuous use of opioids    Chronic bronchitis ([facility])    Nasal congestion    Hypercholesteremia    Bradycardia    History of transcatheter aortic valve replacement (TAVR)    Syncope and collapse    PAF (paroxysmal atrial fibrillation) ([facility])    Bilateral leg weakness    GBS (Guillain-[place] syndrome) ([facility])    Sinus pause    Weakness of both lower extremities    Sinus bradycardia    Ataxia    Peripheral vascular occlusive disease ([facility])    Cellulitis of right foot    Iliac artery occlusion, right ([facility])    Cellulitis and abscess of hand    Type 2 diabetes mellitus with hyperglycemia ([facility])    Acute encephalopathy    Acute hypoxemic respiratory failure ([facility])    Acute CVA (cerebrovascular accident) ([facility] [address] [facility]    Speech problem    Urinary tract infection with hematuria    Respiratory failure with hypoxia ([facility]    Acute respiratory failure with hypercapnia (HCC)    Acute pulmonary edema (HCC)    Grade II diastolic dysfunction    Shock circulatory (HCC)    Smoker    Normocytic normochromic anemia    NSTEMI (non-ST elevated myocardial infarction) (HCC)    SIRS (systemic inflammatory response syndrome) (HCC)    Atrial flutter (HCC)    Liberty-rectal abscess    Somnolence    Pulmonary edema with diastolic CHF, NYHA class 3 (HCC)    Respiratory failure ([facility] [facility]    Former smoker    Cardiomyopathy ([facility]    Morbid obesity with BMI of 40.0-44.9, adult (HCC)    Hypoglycemia    Altered mental status     Elevated troponin, high risk abnormal stress test, plan on cardiac catheterization tomorrow, patient is undergoing dialysis today. Risk/benefits/treatment/outcome discussed with patient, he understands/agrees.     CAD/ASHD, continue dual antiplatelet therapy     Paroxysmal atrial fibrillation, on Eliquis, will hold that for possible catheterization/angiography tomorrow     Mixed cardiomyopathy, ischemic/nonischemic, continue metoprolol, long-acting nitrates, start entresto      Pneumonia, chronic respiratory failure, dm, esrd as per primary service    DISCUSSION OF CARDIAC CATHETERIZATION PROCEDURES: The procedures, indications, risks and alternatives have been discussed with the patient and, as appropriate, with the patient's guardian . Risks discussed included, but are not limited to, bleeding, development of blood clots/emboli, damage to blood vessels, renal failure, malignant cardiac arrhythmias, stroke, heart attack, emergent coronary bypass surgery, death, dye allergy. The patient (and guardian as appropriate) expressed understanding of the aforementioned and wished to proceed. Thank you for allowing me to participate in the care of your patient. Please call me with any questions [phone].       Mary Groves MD, [facility]   Interventional Cardiologist  [address]  [phone] [facility] Office  [phone] [address] [place] [address]  6/6/2022 9:50 AM

## 2022-06-06 NOTE — PROGRESS NOTES
[facility] Progress Note           Subjective/   45y.o. year old male who we are seeing in consultation for ESKD on HD. HPI:  Last HD on 6/3 with 3.1 liters removed, post-weight of 105.1 kg. Seen on dialysis. Orders confirmed. Pre-weight at HD today 113.5 kg. ROS:  States shortness of breath on and off, does not improve with dialysis. No chest pain.       Objective/   GEN:  Chronically ill, /63   Pulse 59   Temp 98.4 °F (36.9 °C) (Oral)   Resp 18   Ht 5' 6\" (1.676 m)   Wt 231 lb 11.2 oz (105.1 kg)   SpO2 99%   BMI 37.40 kg/m²    HEENT: non-icteric, no JVD  CV: S1, S2 without m/r/g; no LE edema  RESP: CTA B without w/r/r; breathing wnl  ABD: +bs, soft, nt, no hsm  SKIN: warm, no rashes  ACCESS: Left IJ [facility]    Data/  Recent Labs     06/06/22  0629   WBC 10.1   HGB 10.0*   HCT 29.6*   MCV 89.4        Recent Labs     06/06/22  0629   *   K 4.5   CL 83*   CO2 23   GLUCOSE 150*   BUN 64*   CREATININE 8.1*   LABGLOM 7*   GFRAA 8*       Assessment/     # End stage kidney disease - on HD Mon-Wed-Fri     # Acute on chronic respiratory failure r/o sepsis - completed course of antibiotics     # sCHF/Ischemic cardiomyopathy - EF worse at ~20-25% this admission, decompensated    # CAD: s/p PATRICIA RCA 1/14/2022; s/p PATRICIA LAD 2015    # Bicuspid AV/severe AS: s/p TAVR 10/2019    # Hypertension - better controlled     # Anemia of chronic disease - DAVON with HD    # Hyponatremia - monitor with HD       Plan/     - HD today per MWF schedule with UF as tolerated with crit line monitoring, under  EDW of 107 kg from prior treatments this admission  - DAVON 10K qHD  - Trend labs, bp's

## 2022-06-06 NOTE — CARE COORDINATION
CM met with pt at bedside. PT/OT rec home care. Pt declines home care. Pt has home O2 with [facility] and goes to HD at [facility] MWF.

## 2022-06-07 ENCOUNTER — APPOINTMENT (OUTPATIENT)
Dept: CARDIAC CATH/INVASIVE PROCEDURES | Age: 52
DRG: 871 | End: 2022-06-07
Payer: COMMERCIAL

## 2022-06-07 LAB
ALBUMIN SERPL-MCNC: 3.5 G/DL (ref 3.4–5)
ANION GAP SERPL CALCULATED.3IONS-SCNC: 17 MMOL/L (ref 3–16)
BUN BLDV-MCNC: 43 MG/DL (ref 7–20)
CALCIUM SERPL-MCNC: 8.7 MG/DL (ref 8.3–10.6)
CHLORIDE BLD-SCNC: 91 MMOL/L (ref 99–110)
CO2: 18 MMOL/L (ref 21–32)
CREAT SERPL-MCNC: 5.7 MG/DL (ref 0.9–1.3)
GFR AFRICAN AMERICAN: 13
GFR NON-AFRICAN AMERICAN: 10
GLUCOSE BLD-MCNC: 127 MG/DL (ref 70–99)
GLUCOSE BLD-MCNC: 132 MG/DL (ref 70–99)
GLUCOSE BLD-MCNC: 157 MG/DL (ref 70–99)
HCT VFR BLD CALC: 31.3 % (ref 40.5–52.5)
HEMOGLOBIN: 10.1 G/DL (ref 13.5–17.5)
LV EF: 20 %
LVEF MODALITY: NORMAL
MAGNESIUM: 1.9 MG/DL (ref 1.8–2.4)
MCH RBC QN AUTO: 29.4 PG (ref 26–34)
MCHC RBC AUTO-ENTMCNC: 32.2 G/DL (ref 31–36)
MCV RBC AUTO: 91.1 FL (ref 80–100)
PDW BLD-RTO: 16.6 % (ref 12.4–15.4)
PERFORMED ON: ABNORMAL
PERFORMED ON: ABNORMAL
PHOSPHORUS: 4.5 MG/DL (ref 2.5–4.9)
PLATELET # BLD: 257 K/UL (ref 135–450)
PMV BLD AUTO: 7.9 FL (ref 5–10.5)
POTASSIUM SERPL-SCNC: 4.9 MMOL/L (ref 3.5–5.1)
RBC # BLD: 3.44 M/UL (ref 4.2–5.9)
SODIUM BLD-SCNC: 126 MMOL/L (ref 136–145)
WBC # BLD: 8 K/UL (ref 4–11)

## 2022-06-07 PROCEDURE — 93005 ELECTROCARDIOGRAM TRACING: CPT | Performed by: INTERNAL MEDICINE

## 2022-06-07 PROCEDURE — 83735 ASSAY OF MAGNESIUM: CPT

## 2022-06-07 PROCEDURE — 93460 R&L HRT ART/VENTRICLE ANGIO: CPT | Performed by: INTERNAL MEDICINE

## 2022-06-07 PROCEDURE — 85347 COAGULATION TIME ACTIVATED: CPT

## 2022-06-07 PROCEDURE — 6370000000 HC RX 637 (ALT 250 FOR IP): Performed by: NURSE PRACTITIONER

## 2022-06-07 PROCEDURE — 6360000004 HC RX CONTRAST MEDICATION

## 2022-06-07 PROCEDURE — 6370000000 HC RX 637 (ALT 250 FOR IP): Performed by: INTERNAL MEDICINE

## 2022-06-07 PROCEDURE — 2500000003 HC RX 250 WO HCPCS

## 2022-06-07 PROCEDURE — 94761 N-INVAS EAR/PLS OXIMETRY MLT: CPT

## 2022-06-07 PROCEDURE — C1769 GUIDE WIRE: HCPCS

## 2022-06-07 PROCEDURE — 2580000003 HC RX 258

## 2022-06-07 PROCEDURE — 1200000000 HC SEMI PRIVATE

## 2022-06-07 PROCEDURE — C1887 CATHETER, GUIDING: HCPCS

## 2022-06-07 PROCEDURE — 85027 COMPLETE CBC AUTOMATED: CPT

## 2022-06-07 PROCEDURE — 99152 MOD SED SAME PHYS/QHP 5/>YRS: CPT | Performed by: INTERNAL MEDICINE

## 2022-06-07 PROCEDURE — 93460 R&L HRT ART/VENTRICLE ANGIO: CPT

## 2022-06-07 PROCEDURE — 94660 CPAP INITIATION&MGMT: CPT

## 2022-06-07 PROCEDURE — 4A023N8 MEASUREMENT OF CARDIAC SAMPLING AND PRESSURE, BILATERAL, PERCUTANEOUS APPROACH: ICD-10-PCS | Performed by: INTERNAL MEDICINE

## 2022-06-07 PROCEDURE — 2709999900 HC NON-CHARGEABLE SUPPLY

## 2022-06-07 PROCEDURE — B2111ZZ FLUOROSCOPY OF MULTIPLE CORONARY ARTERIES USING LOW OSMOLAR CONTRAST: ICD-10-PCS | Performed by: INTERNAL MEDICINE

## 2022-06-07 PROCEDURE — 80069 RENAL FUNCTION PANEL: CPT

## 2022-06-07 PROCEDURE — 36415 COLL VENOUS BLD VENIPUNCTURE: CPT

## 2022-06-07 PROCEDURE — C1894 INTRO/SHEATH, NON-LASER: HCPCS

## 2022-06-07 PROCEDURE — 2580000003 HC RX 258: Performed by: INTERNAL MEDICINE

## 2022-06-07 PROCEDURE — 2700000000 HC OXYGEN THERAPY PER DAY

## 2022-06-07 PROCEDURE — B2151ZZ FLUOROSCOPY OF LEFT HEART USING LOW OSMOLAR CONTRAST: ICD-10-PCS | Performed by: INTERNAL MEDICINE

## 2022-06-07 PROCEDURE — 6370000000 HC RX 637 (ALT 250 FOR IP)

## 2022-06-07 PROCEDURE — 6360000002 HC RX W HCPCS

## 2022-06-07 RX ORDER — FENTANYL CITRATE 50 UG/ML
INJECTION, SOLUTION INTRAMUSCULAR; INTRAVENOUS
Status: COMPLETED | OUTPATIENT
Start: 2022-06-07 | End: 2022-06-07

## 2022-06-07 RX ORDER — MIDAZOLAM HYDROCHLORIDE 1 MG/ML
INJECTION INTRAMUSCULAR; INTRAVENOUS
Status: COMPLETED | OUTPATIENT
Start: 2022-06-07 | End: 2022-06-07

## 2022-06-07 RX ORDER — SODIUM CHLORIDE 0.9 % (FLUSH) 0.9 %
5-40 SYRINGE (ML) INJECTION EVERY 12 HOURS SCHEDULED
Status: DISCONTINUED | OUTPATIENT
Start: 2022-06-07 | End: 2022-06-08 | Stop reason: HOSPADM

## 2022-06-07 RX ORDER — SODIUM CHLORIDE 0.9 % (FLUSH) 0.9 %
5-40 SYRINGE (ML) INJECTION PRN
Status: DISCONTINUED | OUTPATIENT
Start: 2022-06-07 | End: 2022-06-08 | Stop reason: HOSPADM

## 2022-06-07 RX ORDER — SODIUM CHLORIDE 9 MG/ML
INJECTION, SOLUTION INTRAVENOUS PRN
Status: DISCONTINUED | OUTPATIENT
Start: 2022-06-07 | End: 2022-06-08 | Stop reason: HOSPADM

## 2022-06-07 RX ORDER — ASPIRIN 81 MG/1
81 TABLET, CHEWABLE ORAL ONCE
Status: COMPLETED | OUTPATIENT
Start: 2022-06-07 | End: 2022-06-07

## 2022-06-07 RX ORDER — DIPHENHYDRAMINE HYDROCHLORIDE 50 MG/ML
INJECTION INTRAMUSCULAR; INTRAVENOUS
Status: COMPLETED | OUTPATIENT
Start: 2022-06-07 | End: 2022-06-07

## 2022-06-07 RX ORDER — HEPARIN SODIUM 1000 [USP'U]/ML
INJECTION, SOLUTION INTRAVENOUS; SUBCUTANEOUS
Status: COMPLETED | OUTPATIENT
Start: 2022-06-07 | End: 2022-06-07

## 2022-06-07 RX ORDER — ACETAMINOPHEN 325 MG/1
650 TABLET ORAL EVERY 4 HOURS PRN
Status: DISCONTINUED | OUTPATIENT
Start: 2022-06-07 | End: 2022-06-08 | Stop reason: HOSPADM

## 2022-06-07 RX ADMIN — FENTANYL CITRATE 50 MCG: 50 INJECTION, SOLUTION INTRAMUSCULAR; INTRAVENOUS at 09:52

## 2022-06-07 RX ADMIN — METOPROLOL SUCCINATE 50 MG: 50 TABLET, EXTENDED RELEASE ORAL at 07:44

## 2022-06-07 RX ADMIN — FENTANYL CITRATE 25 MCG: 50 INJECTION, SOLUTION INTRAMUSCULAR; INTRAVENOUS at 10:55

## 2022-06-07 RX ADMIN — CYCLOBENZAPRINE 10 MG: 10 TABLET, FILM COATED ORAL at 22:42

## 2022-06-07 RX ADMIN — SACUBITRIL AND VALSARTAN 1 TABLET: 24; 26 TABLET, FILM COATED ORAL at 22:42

## 2022-06-07 RX ADMIN — PRAVASTATIN SODIUM 40 MG: 40 TABLET ORAL at 22:42

## 2022-06-07 RX ADMIN — ISOSORBIDE DINITRATE 20 MG: 20 TABLET ORAL at 07:44

## 2022-06-07 RX ADMIN — GABAPENTIN 200 MG: 100 CAPSULE ORAL at 22:41

## 2022-06-07 RX ADMIN — Medication 10 ML: at 22:43

## 2022-06-07 RX ADMIN — CLOPIDOGREL BISULFATE 75 MG: 75 TABLET ORAL at 07:16

## 2022-06-07 RX ADMIN — MIDAZOLAM HYDROCHLORIDE 1 MG: 1 INJECTION INTRAMUSCULAR; INTRAVENOUS at 10:55

## 2022-06-07 RX ADMIN — QUETIAPINE FUMARATE 25 MG: 25 TABLET ORAL at 22:42

## 2022-06-07 RX ADMIN — OXYCODONE AND ACETAMINOPHEN 1 TABLET: 7.5; 325 TABLET ORAL at 02:03

## 2022-06-07 RX ADMIN — DIPHENHYDRAMINE HYDROCHLORIDE 50 MG: 50 INJECTION INTRAMUSCULAR; INTRAVENOUS at 10:54

## 2022-06-07 RX ADMIN — FENTANYL CITRATE 50 MCG: 50 INJECTION, SOLUTION INTRAMUSCULAR; INTRAVENOUS at 10:37

## 2022-06-07 RX ADMIN — TRAZODONE HYDROCHLORIDE 50 MG: 50 TABLET ORAL at 22:42

## 2022-06-07 RX ADMIN — ISOSORBIDE DINITRATE 20 MG: 20 TABLET ORAL at 22:42

## 2022-06-07 RX ADMIN — ASPIRIN 81 MG: 81 TABLET, CHEWABLE ORAL at 08:26

## 2022-06-07 RX ADMIN — MIDAZOLAM HYDROCHLORIDE 2 MG: 1 INJECTION INTRAMUSCULAR; INTRAVENOUS at 10:37

## 2022-06-07 RX ADMIN — HEPARIN SODIUM 5000 UNITS: 1000 INJECTION, SOLUTION INTRAVENOUS; SUBCUTANEOUS at 10:43

## 2022-06-07 RX ADMIN — MIDAZOLAM HYDROCHLORIDE 2 MG: 1 INJECTION INTRAMUSCULAR; INTRAVENOUS at 09:53

## 2022-06-07 ASSESSMENT — PAIN DESCRIPTION - DESCRIPTORS: DESCRIPTORS: DULL;THROBBING

## 2022-06-07 ASSESSMENT — PAIN SCALES - GENERAL
PAINLEVEL_OUTOF10: 0
PAINLEVEL_OUTOF10: 9

## 2022-06-07 NOTE — FLOWSHEET NOTE
06/07/22 1445   Vital Signs   BP (!) 140/78   BP Location Left lower arm   MAP (Calculated) 98.67   Pain Assessment   Pain Assessment None - Denies Pain   Care Plan - Pain Goals   Verbalizes/displays adequate comfort level or baseline comfort level Encourage patient to monitor pain and request assistance;Assess pain using appropriate pain scale   Oxygen Therapy   SpO2 96 %   O2 Device Nasal cannula   O2 Flow Rate (L/min) 3 L/min   pt has noted audible snoring resp even , vitals as above, easily aroused with voice states name and drifts back to sleep call light within reach bed in low position .  Right radial pulses noted palpable and strong CRT<3 arm board on for wrist safety

## 2022-06-07 NOTE — FLOWSHEET NOTE
06/07/22 1334   Vital Signs   /80   BP Location Left Arm   MAP (Calculated) 99   Pain Assessment   Pain Assessment None - Denies Pain   Peripheral Vascular   Peripheral Vascular (WDL) WDL   Puncture Site Assessment 1   Location Radial - right   Site Assessment No redness, drainage, swelling or hematoma   Hemostasis Intervention Pressure Device   Dressing Applied Transparent occlusive dressing   TR band removed , transparent pressure dressing applied with gauze . No active bleeding , arm board for support strong palpable radial pulses noted , cap refil <3 sec .  Pt snoring audibly noted vitals WNL easily aroused to voice call light within reach

## 2022-06-07 NOTE — PROGRESS NOTES
Physical Therapy/Occupational Therapy    Attempted to see pt for follow up PT tx. Pt currently off floor for heart cath. Will continue to follow as schedule allows.     Malcolm Hurt, PT, DPT  Ramon Meier, OTR/L

## 2022-06-07 NOTE — PROGRESS NOTES
Pt LE for heart cath at this time .  Nightshift RN obtained consent and spoke to cath lab concerning prep and transport

## 2022-06-07 NOTE — CARE COORDINATION
Remains extremely lethargic following cardiac cath today. Will likely dc home tomorrow.   HD at [facility] M-W-F ([phone])  Home Q2 [facility] provider  Refuses recommended Reyna Nowak RN

## 2022-06-07 NOTE — PROGRESS NOTES
Hospitalist Progress Note      PCP: Angelique Stone MD    Date of Admission: 5/29/2022    Chief Complaint: confusion at home    Subjective: no new c/o.        Medications:  Reviewed    Infusion Medications    dextrose      sodium chloride       Scheduled Medications    isosorbide dinitrate  20 mg Oral TID    tiotropium-olodaterol  2 puff Inhalation Nightly    lactobacillus  1 capsule Oral Daily with breakfast    gabapentin  200 mg Oral TID    sacubitril-valsartan  1 tablet Oral BID    epoetin siddharth-epbx  10,000 Units IntraVENous Q MWF    insulin lispro  0-3 Units SubCUTAneous Nightly    insulin lispro  0-6 Units SubCUTAneous TID WC    mupirocin   Topical Daily    QUEtiapine  25 mg Oral Nightly    [Held by provider] apixaban  5 mg Oral BID    b complex-C-folic acid  1 mg Oral Daily    calcium acetate  1 capsule Oral TID WC    clopidogrel  75 mg Oral Daily    docusate sodium  100 mg Oral Daily    DULoxetine  30 mg Oral Daily    sennosides-docusate sodium  1 tablet Oral Daily    pravastatin  40 mg Oral Nightly    pantoprazole  40 mg Oral QAM AC    metoprolol succinate  50 mg Oral BID    sodium chloride flush  5-40 mL IntraVENous 2 times per day     PRN Meds: oxyCODONE-acetaminophen, perflutren lipid microspheres, glucose, dextrose bolus **OR** dextrose bolus, glucagon (rDNA), dextrose, albuterol sulfate HFA, calcium carbonate, cyclobenzaprine, ipratropium-albuterol, nitroGLYCERIN, mineral oil, oxyCODONE-acetaminophen, sodium chloride flush, sodium chloride, ondansetron **OR** ondansetron, polyethylene glycol, acetaminophen **OR** acetaminophen, hydrOXYzine HCl, traZODone      Intake/Output Summary (Last 24 hours) at 6/7/2022 0922  Last data filed at 6/6/2022 2000  Gross per 24 hour   Intake 720 ml   Output --   Net 720 ml       Physical Exam Performed:    BP (!) 148/85   Pulse 65   Temp 98 °F (36.7 °C) (Oral)   Resp 16   Ht 5' 6\" (1.676 m)   Wt 273 lb 5.9 oz (124 kg)   SpO2 98%   BMI 44.12 kg/m²     General appearance: No apparent distress, appears stated age and cooperative. HEENT: Pupils equal, round, and reactive to light. Conjunctivae/corneas clear. Neck: Supple, with full range of motion. No jugular venous distention. Trachea midline. Respiratory:  Normal respiratory effort. Clear to auscultation, bilaterally without Rales/Wheezes/Rhonchi. Cardiovascular: Regular rate and rhythm with normal S1/S2 without murmurs, rubs or gallops. Abdomen: Soft, non-tender, non-distended with normal bowel sounds. Musculoskeletal: No clubbing, cyanosis or edema bilaterally. Full range of motion without deformity. Skin: Skin color, texture, turgor normal.  No rashes or lesions. Neurologic:  Neurovascularly intact without any focal sensory/motor deficits. Cranial nerves: II-XII intact, grossly non-focal.  Psychiatric: Alert and oriented, thought content appropriate, normal insight  Capillary Refill: Brisk,< 3 seconds   Peripheral Pulses: +2 palpable, equal bilaterally       Labs:   Recent Labs     06/06/22  0629 06/07/22  0649   WBC 10.1 8.0   HGB 10.0* 10.1*   HCT 29.6* 31.3*    257     Recent Labs     06/06/22  0629 06/07/22  0649   * 126*   K 4.5 4.9   CL 83* 91*   CO2 23 18*   BUN 64* 43*   CREATININE 8.1* 5.7*   CALCIUM 8.6 8.7   PHOS  --  4.5     No results for input(s): AST, ALT, BILIDIR, BILITOT, ALKPHOS in the last 72 hours. No results for input(s): INR in the last 72 hours.   Recent Labs     06/06/22 0629   TROPONINI 0.09*       Urinalysis:      Lab Results   Component Value Date    NITRU Negative 05/29/2022    WBCUA 10-20 05/29/2022    BACTERIA 3+ 05/29/2022    RBCUA 5-10 05/29/2022    BLOODU TRACE-INTACT 05/29/2022    SPECGRAV 1.015 05/29/2022    GLUCOSEU 100 05/29/2022       Consults:    IP CONSULT TO NEPHROLOGY  IP CONSULT TO CARDIOLOGY  IP CONSULT TO PHARMACY  IP CONSULT TO CASE MANAGEMENT  IP CONSULT TO HEART FAILURE NURSE/COORDINATOR  IP CONSULT TO DIETITIAN      Assessment/Plan:    Active Hospital Problems    Diagnosis     Coronary artery disease of native artery of native heart with stable angina pectoris ([facility] [address] [I25.118]      Priority: High    Altered mental status [R41.82]      Priority: Medium    Hypoglycemia [E16.2]      Priority: Medium    History of transcatheter aortic valve replacement (TAVR) [Z95.2]      Priority: Medium    ESRD (end stage renal disease) ([facility] [address] [N18.6]     Elevated troponin [R77.8]     PNA (pneumonia) [J18.9]           HCAP, with sepsis present on arrival.  No guiding culture data. Completed course of empiric Vanc/Zosyn. Complicated by diarrhea, likely abx-induced. Patient has h/o c.diff. Diarrhea improved now that he is off ABX. Probiotic.       Pleural effusions, L>R. Likely due to VO from acute on chronic systolic and diastolic CHF, ESRD. F/u CXR.     ESRD on HD. Nephrology removed fluid, lowered target weight, added extra ultrafiltration session on 6/2, and continued with M/W/F schedule/     Chronic hypoxic respiratory failure. Baseline 3-4LNC.     Chest pain, with known h/o CAD.   Appreciate cardiology input. Stress test showed mostly scar, some ischemia, similar to prior. TTE showed EF 20-25% with RWMAs (EF 40-45% in 1/2022).  Continue clopidogrel, statin, metoprolol, increased ISDN. Cardiology noted that they would consider LHC if his symptoms persisted after adequate fluid removal.  They are considering LHC 6/6.     DM2.  Complicated by hypoglycemia on admission, with acute metabolic encephalopathy.  Stopped insulin and his sugars were fine.  F/u with PCP.     HTN - w/out known CAD and no evidence of active signs/sxs of ischemia/failure. Currently controlled on home meds w/ vitals reviewed and documented as above. Cardiology started entresto.     Cardiomyopathy. Likely ischemic. Metoprolol and entresto as above.   Cardiology could consider an AICD in 90 days if no improvement.      PAF.  Apixaban (held for possible LHC), metoprolol.             DVT Prophylaxis: Eliquis - held, IPC    Recent Labs     06/06/22  0629 06/07/22  0649    257     Diet: Diet NPO Exceptions are: Sips of Water with Meds  Code Status: Full Code      PT/OT Eval Status: seen w/ recs for home PT/OT. Dispo - Patient is likely to remain in-house at least until Tues/Wed 7/8 June pending clinical course and subspecialty recs - unclear at this point. High risk for ongoing readmissions.   Here is what we need prior to discharge:     - nephrology input as to whether his dry weight can be lowered any further    Perhaps ready to go home as early as 7 June if nephrology thinks his dry weight is as low as it is going to get and if LHC is  stable         Hilario Hamman, MD

## 2022-06-07 NOTE — PROCEDURES
CARDIAC CATHETERIZATION REPORT     Procedure Date:  2022  Patient Name: Ryley Turner  MRN: [id number] : 1970      INDICATION     High risk abnormal stress test  Ischemic or mildly  Non-STEMI    PROCEDURES PERFORMED     Right heart catheterization  Left heart catheterization  LVgram  Coronary angiogam  Coronary cath  Monitoring of moderate conscious sedation          PROCEDURE DESCRIPTION   Risks/benefits/alternatives/outcomes were discussed with patient and/or family and informed consent was obtained. Using the [facility] scale, the patient's right radial artery was found to be a level B. Patient was prepped draped in the usual sterile fashion. Local anaesthetic was applied over puncture site. Using ultrasound guidance and a front wall technique, a 4/5 [place] Terumo sheath was inserted into right radial artery. Verapamil, nitroglycerin, nicardipine were administered through the sheath. Heparin was administered. Diagnostic 5 [place] pigtail, TIG, Binu catheters were used for diagnostic angiograms. At the conclusion of the procedure, a TR band was placed over the puncture site and hemostasis was obtained. There were no immediate complications. I supervised sedation from 9:53 AM to 11:08 AM with versed 5 mg/fentanyl 125 mcg during the procedure. An independent trained observer pushed meds at my direction. We monitored the patient's level of consciousness and vital signs/physiologic status throughout the procedure duration (see times listed previously). 205 cc contrast was utilized. <20cc EBL. Case/findings/plans discussed with patient's wife, she understood/agreed.       FINDINGS     HEMODYNAMICS    CHAMBER PRESSURE SATURATION   RA  10 60%   RV  47/9    PA  50/20  67%   PW  19    AORTA  101/50  95%     NANCY CARDIAC OUTPUT  5.8 L/min   SVR  756    PVR  234          LVGRAM    LVEDP  21   GRADIENT ACROSS AORTIC VALVE  less than 5 mmHg   LV FUNCTION EF 20%   WALL MOTION  severe global hypokinesis with regional variation   MITRAL REGURGITATION  mild       CORONARY ARTERIES    LM Less than 10% [id number] stenosis         LAD Moderately calcified, 20 to 30% proximal-mid stenosis, distal vessel tapers at the apex with 60% diffuse disease. D1 has an ostial/proximal 75 to 80% stenosis. LCX  Calcified, proximal-mid 75 to 80% stenosis. Distal vessel is tortuous with 70 to 85% diffuse stenosis with more discrete stenosis noted distally. OM 1 is a very small vessel with ostial/proximal 80% stenosis and 60 to 70% diffuse disease in the kqhixqzh-muq-lyqohe vessel. Kellie Hinkle RCA Dominant, calcified, tortuous, there is a proximal-mid stent with 60 to 70% in-stent restenosis. Distal vessel 20 to 30% stenosis           CONCLUSIONS:     Mild to moderately increased filling pressures  Patent RCA stent with moderate to borderline severe in-stent restenosis  Severe circumflex and D1 stenosis  Continue medical management, consider outpatient high risk PCI of above territories pending outpatient clinical follow-up. Currently medical management seems best given comorbidities and need for additional fluid removal.  If PCI is pursued, will likely need general anesthesia. Patient had difficulty lying still on Cath Lab table. Okay to resume Eliquis tomorrow, case/plan/findings discussed with patient and wife, they understand/agree, they stated it is incorrectly stated in chart the patient would refuse blood, he is okay to receive blood products if needed.   Continue beta-blocker and Entresto    No further inpatient cardiac work-up or treatment plan, will sign off, please call questions

## 2022-06-07 NOTE — PROGRESS NOTES
Brief Pre-Op Note/Sedation Assessment      Homero Bobo  1970  [id number]  9:17 AM    Planned Procedure: Cardiac Catheterization Procedure  Post Procedure Plan: Return to same level of care  Consent: I have discussed with the patient and/or the patient representative the indication, alternatives, and the possible risks and/or complications of the planned procedure and the anesthesia methods. The patient and/or patient representative appear to understand and agree to proceed. DISCUSSION OF CARDIAC CATHETERIZATION PROCEDURES: The procedures, indications, risks and alternatives have been discussed with the patient and, as appropriate, with the patient's guardian . Risks discussed included, but are not limited to, bleeding, development of blood clots/emboli, damage to blood vessels, renal failure, malignant cardiac arrhythmias, stroke, heart attack, emergent coronary bypass surgery, death, dye allergy. The patient (and guardian as appropriate) expressed understanding of the aforementioned and wished to proceed. Chief Complaint:   Chest Pain/Pressure  NSTEMI      Indications for Cath Procedure:  1. Presentation:  Worsening Angina  2. Anginal Classification within 2 weeks:  CCS IV - Inability to perform any activity without angina or angina at rest, i.e., severe limitation  3. Angina Symptoms Assessment:  Typical Chest Pain  4. Heart Failure Class within last 2 weeks:  Yes:  Heart Failure Type: Systolic Severity:  Class IV - Symptoms of HF at rest  5. Cardiovascular Instability:  No    Prior Ischemic Workup/Eval:  1. Pre-Procedural Medications: Yes: Beta Blockers, statin  2. Stress Test Completed? Yes:  Stress or Imaging Studies Performed (within ANY time period):   Type:  Stress Nuclear  Results:  Positive:  Myocardial Perfusion Defects (Nuclear) Extent of Ischemia:  High Risk (>3% annual death or MI)    Does Patient need surgery?   Cath Valve Surgery:  No    Pre-Procedure Medical History:  Vital Signs:  BP (!) 148/85   Pulse 65   Temp 98 °F (36.7 °C) (Oral)   Resp 16   Ht 5' 6\" (1.676 m)   Wt 273 lb 5.9 oz (124 kg)   SpO2 98%   BMI 44.12 kg/m²     Allergies:     Allergies   Allergen Reactions    Morphine Nausea And Vomiting     Medications:    Current Facility-Administered Medications   Medication Dose Route Frequency Provider Last Rate Last Admin    isosorbide dinitrate (ISORDIL) tablet 20 mg  20 mg Oral TID Lidia Raissa APRKASANDRA - CNP   20 mg at 06/07/22 0744    tiotropium-olodaterol (STIOLTO) 2.5-2.5 MCG/ACT inhaler 2 puff  2 puff Inhalation Nightly Daksha Jack MD   2 puff at 06/06/22 1959    lactobacillus (CULTURELLE) capsule 1 capsule  1 capsule Oral Daily with breakfast Daksha Jack MD   1 capsule at 06/06/22 1341    gabapentin (NEURONTIN) capsule 200 mg  200 mg Oral TID Daksha Jack MD   200 mg at 06/06/22 2003    oxyCODONE-acetaminophen (PERCOCET) 7.5-325 MG per tablet 1 tablet  1 tablet Oral Q6H PRN Daksha Jack MD   1 tablet at 06/07/22 0203    sacubitril-valsartan (ENTRESTO) 24-26 MG per tablet 1 tablet  1 tablet Oral BID Uriel Scruggs MD   1 tablet at 06/06/22 2010    perflutren lipid microspheres (DEFINITY) injection 1.65 mg  1.5 mL IntraVENous ONCE PRN Uriel Scruggs MD        epoetin siddharth-epbx (RETACRIT) injection 10,000 Units  10,000 Units IntraVENous Q MWF Kamila Hammond MD   10,000 Units at 06/06/22 1219    glucose chewable tablet 16 g  4 tablet Oral PRN Daksha Jack MD        dextrose bolus 10% 125 mL  125 mL IntraVENous PRN Daksha Jack MD        Or    dextrose bolus 10% 250 mL  250 mL IntraVENous PRN Daksha Jack MD        glucagon (rDNA) injection 1 mg  1 mg IntraMUSCular PRN Daksha Jack MD        dextrose 5 % solution  100 mL/hr IntraVENous PRN Daksha Jack MD        insulin lispro (HUMALOG) injection vial 0-3 Units  0-3 Units SubCUTAneous Nightly Sandralee Crosbyton, MD        insulin lispro (HUMALOG) injection vial 0-6 Units  0-6 Units SubCUTAneous TID  Gloria العراقي MD   2 Units at 06/04/22 1746    mupirocin (BACTROBAN) 2 % ointment   Topical Daily Jose Graham MD   Given at 06/05/22 0855    QUEtiapine (SEROQUEL) tablet 25 mg  25 mg Oral Nightly Jose Graham MD   25 mg at 06/06/22 2004    albuterol sulfate  (90 Base) MCG/ACT inhaler 2 puff  2 puff Inhalation Q6H PRN Jose Graham MD        [Held by provider] apixaban Korene Cava) tablet 5 mg  5 mg Oral BID Jose Graham MD   5 mg at 06/04/22 2058    b complex-C-folic acid (NEPHROCAPS) capsule 1 mg  1 mg Oral Daily Jose Graham MD   1 mg at 06/06/22 1341    calcium acetate (PHOSLO) capsule 667 mg  1 capsule Oral TID  Jose Graham MD   667 mg at 06/06/22 1759    clopidogrel (PLAVIX) tablet 75 mg  75 mg Oral Daily Jose Graham MD   75 mg at 06/07/22 0716    calcium carbonate (TUMS) chewable tablet 500 mg  1 tablet Oral TID PRN Jose Graham MD        cyclobenzaprine (FLEXERIL) tablet 10 mg  10 mg Oral TID PRN Jose Graham MD   10 mg at 06/06/22 2152    docusate sodium (COLACE) capsule 100 mg  100 mg Oral Daily Jose Graham MD   100 mg at 06/06/22 1338    DULoxetine (CYMBALTA) extended release capsule 30 mg  30 mg Oral Daily Jose Graham MD   30 mg at 06/06/22 1338    ipratropium-albuterol (DUONEB) nebulizer solution 3 mL  1 vial Inhalation Q6H PRN Jose Graham MD        sennosides-docusate sodium (SENOKOT-S) 8.6-50 MG tablet 1 tablet  1 tablet Oral Daily Jose Graham MD   1 tablet at 06/06/22 1338    pravastatin (PRAVACHOL) tablet 40 mg  40 mg Oral Nightly Jose Graham MD   40 mg at 06/06/22 2004    pantoprazole (PROTONIX) tablet 40 mg  40 mg Oral QAM AC Jose Graham MD   40 mg at 06/05/22 0717    metoprolol succinate (TOPROL XL) extended release tablet 50 mg  50 mg Oral BID Jose Graham MD   50 mg at 06/07/22 0744    nitroGLYCERIN (NITROSTAT) SL tablet 0.4 mg  0.4 mg SubLINGual Q5 Min PRN Tim Pichardo MD        mineral oil liquid 30 mL  30 mL Oral Daily PRN Tim Pichardo MD        oxyCODONE-acetaminophen (PERCOCET) 7.5-325 MG per tablet 1 tablet  1 tablet Oral Q6H PRN Tim Pichardo MD   1 tablet at 06/06/22 1345    sodium chloride flush 0.9 % injection 5-40 mL  5-40 mL IntraVENous 2 times per day Tim Pichardo MD   10 mL at 06/06/22 2004    sodium chloride flush 0.9 % injection 5-40 mL  5-40 mL IntraVENous PRN Tim Pichardo MD   10 mL at 05/31/22 0321    0.9 % sodium chloride infusion   IntraVENous PRN Tim Pichardo MD        ondansetron (ZOFRAN-ODT) disintegrating tablet 4 mg  4 mg Oral Q8H PRN Tim Pichardo MD        Or    ondansetron [facility]) injection 4 mg  4 mg IntraVENous Q6H PRN Tim Pichardo MD        polyethylene glycol [facility]) packet 17 g  17 g Oral Daily PRN Tim Pichardo MD        acetaminophen (TYLENOL) tablet 650 mg  650 mg Oral Q6H PRN Tim Pichardo MD        Or   [place] acetaminophen (TYLENOL) suppository 650 mg  650 mg Rectal Q6H PRN Tim Pichardo MD        hydrOXYzine (ATARAX) tablet 10 mg  10 mg Oral TID PRN Tim Pichardo MD        traZODone (DESYREL) tablet 50 mg  50 mg Oral Nightly PRN Tim Pichardo MD   50 mg at 06/06/22 2004       Past Medical History:    Past Medical History:   Diagnosis Date    Ambulatory dysfunction     walker for long distances, SOB with distance    Aortic stenosis     echo 2017    Arthritis     hands and hips    Asthma     Bilateral hilar adenopathy syndrome 6/3/2013    CAD (coronary artery disease)     Dr. Dragan Rehman [facility]) 04/19/2019    EF= 43%    CHF (congestive heart failure) (HCC)     Chronic pain     COPD (chronic obstructive pulmonary disease) ([facility] [address]     pulmonology Dr. Yumiko Duran     Diabetes mellitus ([facility] [address]     borderline    Difficult intravenous access     Emphysema of lung ([facility] [address]     ESRD (end stage renal disease) on dialysis ([facility] [address]     MWF    Fear of needles     Gastric ulcer     GERD (gastroesophageal reflux disease)     Heart valve problem     bicuspic valve    Hemodialysis patient ([facility] [address]     History of spinal fracture     work incident    Hx of blood clots     Bilateral lower extremities; stents in place    Hyperlipidemia     Hypertension     MI (myocardial infarction) ([facility] [address] 2019    has had 9 MIs. 2019 was the last    Neuromuscular disorder ([facility] [address]     due to CVA    Numbness and tingling in left arm     from fistula    Pneumonia     PONV (postoperative nausea and vomiting)     Prolonged emergence from general anesthesia     States requires more medication than most people    Sleep apnea     Uses CPAP    Stroke ([facility] [address]     7mm thalamic cva 2017 deficts left side, left side weakness    TIA (transient ischemic attack)     Unspecified diseases of blood and blood-forming organs        Surgical History:    Past Surgical History:   Procedure Laterality Date    AORTIC VALVE REPLACEMENT N/A 10/15/2019    TRANSCATHETER AORTIC VALVE REPLACEMENT FEMORAL APPROACH performed by Jaky Moya MD at [address] [place] [address] Ave Right 7/2/2019    PERITONEAL DIALYSIS CATHETER REMOVAL performed by Alonso Layne MD at [facility] COLONOSCOPY  2/29/2015    WN    CORONARY ANGIOPLASTY WITH STENT PLACEMENT  05/26/15    CYST REMOVAL  08/14/2013    EXCISION CYSTS, NECK X2 AND ABDOMINAL benign    DIAGNOSTIC CARDIAC CATH LAB PROCEDURE      DIALYSIS FISTULA CREATION Left 10/30/2017    LEFT BRACHIAL CEPHALIC FISTULA    DIALYSIS FISTULA CREATION Left 3/27/2019    LIGATION  AV FISTULA performed by Van Menon MD at [address] [place] [address] ENDOSCOPY, COLON, DIAGNOSTIC      IR TUNNELED CATHETER PLACEMENT GREATER THAN 5 YEARS  3/21/2022    IR TUNNELED CATHETER PLACEMENT GREATER THAN 5 YEARS 3/21/2022 [facility] SPECIAL PROCEDURES    IR TUNNELED CATHETER PLACEMENT GREATER THAN 5 YEARS  4/21/2022    IR TUNNELED CATHETER PLACEMENT GREATER THAN 5 YEARS 4/21/2022 [facility] SPECIAL PROCEDURES    IR TUNNELED CATHETER PLACEMENT GREATER THAN 5 YEARS  4/26/2022    IR TUNNELED CATHETER PLACEMENT GREATER THAN 5 YEARS 4/26/2022 [place] [facility] PROCEDURES    OTHER SURGICAL HISTORY  02/01/2017    laparoscopic cholecystectomy with intraoperative cholangiogram    OTHER SURGICAL HISTORY  2018    PORT PLACEMENT  - vas cath    OTHER SURGICAL HISTORY Bilateral 06/26/2018    laprascopic peritoneal dialysis catheter placement    OTHER SURGICAL HISTORY Right 09/2018    peritoneal dialysis port placed on right side of abdomen    OTHER SURGICAL HISTORY  05/28/2019    PTA/Stenting R External Iliac artery    UT LAP INSERTION TUNNELED INTRAPERITONEAL CATHETER N/A 9/21/2018    LAPAROSCOPIC PERITONEAL DIALYSIS CATHETER REPLACEMENT performed by Kathy Granger MD at [address] [facility] N/A 2/24/2022    PERINEAL ABCESS DRAINAGE performed by Kathy Granger MD at [facility] ENDOSCOPY  01/06/2016    UPPER GASTROINTESTINAL ENDOSCOPY  01/29/2017    possible candida, otherwise normal appearing    VASCULAR SURGERY  aprx 2 years ago    2 stents placed, each side of groin             Pre-Sedation:  Pre-Sedation Documentation and Exam:  I have assessed the patient and reviewed the H&P on the chart. Prior History of Anesthesia Complications:   none    Modified Mallampati:  III (soft palate, base of uvula visible)    ASA Classification:  Class 4 - A patient with an incapacitating systemic disease that is a constant threat to life    Ernesto Scale:   Activity:  2 - Able to move 4 extremities voluntarily on command  Respiration:  2 - Able to breathe deeply and cough freely  Circulation:  2 - BP+/- 20mmHg of normal  Consciousness:  2 - Fully awake  Oxygen Saturation (color):  2 - Able to maintain oxygen saturation >92% on room air    Sedation/Anesthesia Plan:  Guard the patient's safety and welfare. Minimize physical discomfort and pain. Minimize negative psychological responses to treatment by providing sedation and analgesia and maximize the potential amnesia. Patient to meet pre-procedure discharge plan.     Medication Planned:  midazolam intravenously and fentanyl intravenously    Patient is an appropriate candidate for plan of sedation:   yes      Electronically signed by Daxa Uriarte MD on 6/7/2022 at 9:17 AM

## 2022-06-07 NOTE — PLAN OF CARE
Problem: Discharge Planning  Goal: Discharge to home or other facility with appropriate resources  6/7/2022 0737 by Debi Crain RN  Outcome: Progressing  6/6/2022 2348 by Aris Rebolledo RN  Outcome: Progressing     Problem: Pain  Goal: Verbalizes/displays adequate comfort level or baseline comfort level  6/7/2022 0737 by Debi Crain RN  Outcome: Progressing  6/6/2022 2348 by Aris Rebolledo RN  Outcome: Progressing     Problem: Safety - Adult  Goal: Free from fall injury  6/7/2022 0737 by Debi Crain RN  Outcome: Progressing  6/6/2022 2348 by Aris Rebolledo RN  Outcome: Progressing     Problem: ABCDS Injury Assessment  Goal: Absence of physical injury  6/7/2022 0737 by Debi Crain RN  Outcome: Progressing  6/6/2022 2348 by Aris Rebolledo RN  Outcome: Progressing     Problem: Chronic Conditions and Co-morbidities  Goal: Patient's chronic conditions and co-morbidity symptoms are monitored and maintained or improved  6/7/2022 0737 by Debi Crain RN  Outcome: Progressing  6/6/2022 2348 by Aris Rebolledo RN  Outcome: Progressing     Problem: Nutrition Deficit:  Goal: Optimize nutritional status  6/7/2022 0737 by Debi Crain RN  Outcome: Progressing  6/6/2022 2348 by Aris Rebolledo RN  Outcome: Progressing

## 2022-06-07 NOTE — PROGRESS NOTES
06/06/22 2258   NIV Type   Skin Protection for O2 Device No  (pt does not want mepilex)   NIV Started/Stopped On   Equipment Type v60   Mode AVAPS   Mask Type Full face mask   Mask Size Large   Settings/Measurements   CPAP/EPAP 6 cmH2O   IPAP Min 18 cmH2O   IPAP Max 25 cmH2O   Vt (Set, mL) 700 mL   Rate Ordered 15   Resp 17   FiO2  30 %   I Time/ I Time % 1 s   Vt Exhaled 789 mL   Minute Volume 13.2 Liters   Mask Leak (lpm) 16 lpm   Comfort Level Good   Using Accessory Muscles No   SpO2 100   Alarm Settings   Alarms On Y   Low Pressure 6 cmH2O   High Pressure  30 cmH2O   Apnea (secs) 20 secs   RR Low  6   RR High 40 br/min

## 2022-06-07 NOTE — PROGRESS NOTES
[facility] Progress Note           Subjective/   45y.o. year old male who we are seeing in consultation for ESKD on HD. HPI:  Last HD on 6/6 with 3.2 liters removed, post-weight of 112.5 kg. States shortness of breath on and off, does not improve with dialysis. ROS:  No chest pain or abdominal pain.     Objective/   GEN:  Chronically ill, BP (!) 148/85   Pulse 65   Temp 98 °F (36.7 °C) (Oral)   Resp 16   Ht 5' 6\" (1.676 m)   Wt 273 lb 5.9 oz (124 kg)   SpO2 98%   BMI 44.12 kg/m²    HEENT: non-icteric, no JVD  CV: S1, S2 without m/r/g; no LE edema  RESP: CTA B without w/r/r; breathing wnl  ABD: +bs, soft, nt, no hsm  SKIN: warm, no rashes  ACCESS: Left IJ [facility], [place], operated by [facility]    Data/  Recent Labs     06/06/22  0629 06/07/22  0649   WBC 10.1 8.0   HGB 10.0* 10.1*   HCT 29.6* 31.3*   MCV 89.4 91.1    257     Recent Labs     06/06/22  0629 06/07/22  0649   * 126*   K 4.5 4.9   CL 83* 91*   CO2 23 18*   GLUCOSE 150* 127*   PHOS  --  4.5   MG  --  1.90   BUN 64* 43*   CREATININE 8.1* 5.7*   LABGLOM 7* 10*   GFRAA 8* 13*       Assessment/     # End stage kidney disease - on HD Mon-Wed-Fri     # Acute on chronic respiratory failure r/o sepsis - completed course of antibiotics     # sCHF/Ischemic cardiomyopathy - EF worse at ~20-25% this admission, decompensated    # CAD: s/p PATRICIA RCA 1/14/2022; s/p PATRICIA LAD 2015    # Bicuspid AV/severe AS: s/p TAVR 10/2019    # Hypertension - better controlled     # Anemia of chronic disease - DAVON with HD    # Hyponatremia - monitor with HD       Plan/     - HD today per MWF schedule with UF as tolerated with crit line monitoring, under  EDW of 107 kg from prior treatments this admission  - DAVON 10K qHD  - Trend labs, bp's

## 2022-06-07 NOTE — PROGRESS NOTES
06/07/22 0008   NIV Type   $NIV $Daily Charge   Equipment Type v60   Mode AVAPS   Mask Type Full face mask   Mask Size Large   Settings/Measurements   CPAP/EPAP 6 cmH2O   IPAP Min 18 cmH2O   IPAP Max 25 cmH2O   Vt (Set, mL) 700 mL   Rate Ordered 15   Resp 19   FiO2  30 %   I Time/ I Time % 1 s   Vt Exhaled 715 mL   Minute Volume 14.6 Liters   Mask Leak (lpm) 22 lpm   Comfort Level Good   Using Accessory Muscles No   SpO2 98   Alarm Settings   Alarms On Y   Low Pressure 6 cmH2O   High Pressure  30 cmH2O   Apnea (secs) 20 secs   RR Low  6   RR High 40 br/min

## 2022-06-07 NOTE — FLOWSHEET NOTE
06/07/22 1315   Vital Signs   /72   BP Location Left Arm   MAP (Calculated) 93.67   Pain Assessment   Pain Assessment None - Denies Pain   Oxygen Therapy   SpO2 96 %   O2 Device Nasal cannula   O2 Flow Rate (L/min) 3 L/min   pt arrived from cath lab noted drowsy will arouse and state name vitals as above. Unable to obtain BP on left or right anle  ankles so utilized left wrist, TR band still in place to right wrist when pt arrived to room , arm board in place , noted strong radial pulses .  2ML of air removed will continue to monitor and deflate per order

## 2022-06-08 VITALS
OXYGEN SATURATION: 94 % | HEART RATE: 61 BPM | HEIGHT: 66 IN | WEIGHT: 263.45 LBS | DIASTOLIC BLOOD PRESSURE: 58 MMHG | RESPIRATION RATE: 18 BRPM | SYSTOLIC BLOOD PRESSURE: 128 MMHG | TEMPERATURE: 97.5 F | BODY MASS INDEX: 42.34 KG/M2

## 2022-06-08 LAB
ALBUMIN SERPL-MCNC: 3.6 G/DL (ref 3.4–5)
ANION GAP SERPL CALCULATED.3IONS-SCNC: 13 MMOL/L (ref 3–16)
BUN BLDV-MCNC: 55 MG/DL (ref 7–20)
CALCIUM SERPL-MCNC: 8.4 MG/DL (ref 8.3–10.6)
CHLORIDE BLD-SCNC: 91 MMOL/L (ref 99–110)
CO2: 21 MMOL/L (ref 21–32)
CREAT SERPL-MCNC: 7.3 MG/DL (ref 0.9–1.3)
EKG ATRIAL RATE: 67 BPM
EKG DIAGNOSIS: NORMAL
EKG P AXIS: 61 DEGREES
EKG P-R INTERVAL: 220 MS
EKG Q-T INTERVAL: 428 MS
EKG QRS DURATION: 108 MS
EKG QTC CALCULATION (BAZETT): 452 MS
EKG R AXIS: -26 DEGREES
EKG T AXIS: 144 DEGREES
EKG VENTRICULAR RATE: 67 BPM
GFR AFRICAN AMERICAN: 9
GFR NON-AFRICAN AMERICAN: 8
GLUCOSE BLD-MCNC: 159 MG/DL (ref 70–99)
GLUCOSE BLD-MCNC: 251 MG/DL (ref 70–99)
HCT VFR BLD CALC: 28.4 % (ref 40.5–52.5)
HEMOGLOBIN: 9.3 G/DL (ref 13.5–17.5)
MAGNESIUM: 1.9 MG/DL (ref 1.8–2.4)
MCH RBC QN AUTO: 30 PG (ref 26–34)
MCHC RBC AUTO-ENTMCNC: 32.7 G/DL (ref 31–36)
MCV RBC AUTO: 91.9 FL (ref 80–100)
PDW BLD-RTO: 17.2 % (ref 12.4–15.4)
PERFORMED ON: ABNORMAL
PHOSPHORUS: 6.2 MG/DL (ref 2.5–4.9)
PLATELET # BLD: 233 K/UL (ref 135–450)
PMV BLD AUTO: 7.8 FL (ref 5–10.5)
POC ACT LR: 133 SEC
POC ACT LR: 139 SEC
POC ACT LR: 310 SEC
POTASSIUM SERPL-SCNC: 5.6 MMOL/L (ref 3.5–5.1)
RBC # BLD: 3.09 M/UL (ref 4.2–5.9)
SODIUM BLD-SCNC: 125 MMOL/L (ref 136–145)
WBC # BLD: 8.9 K/UL (ref 4–11)

## 2022-06-08 PROCEDURE — 83735 ASSAY OF MAGNESIUM: CPT

## 2022-06-08 PROCEDURE — 36415 COLL VENOUS BLD VENIPUNCTURE: CPT

## 2022-06-08 PROCEDURE — 85027 COMPLETE CBC AUTOMATED: CPT

## 2022-06-08 PROCEDURE — 93010 ELECTROCARDIOGRAM REPORT: CPT | Performed by: INTERNAL MEDICINE

## 2022-06-08 PROCEDURE — 80069 RENAL FUNCTION PANEL: CPT

## 2022-06-08 PROCEDURE — 6360000002 HC RX W HCPCS: Performed by: INTERNAL MEDICINE

## 2022-06-08 PROCEDURE — 90935 HEMODIALYSIS ONE EVALUATION: CPT

## 2022-06-08 PROCEDURE — 6370000000 HC RX 637 (ALT 250 FOR IP): Performed by: INTERNAL MEDICINE

## 2022-06-08 PROCEDURE — 6370000000 HC RX 637 (ALT 250 FOR IP): Performed by: NURSE PRACTITIONER

## 2022-06-08 PROCEDURE — 2500000003 HC RX 250 WO HCPCS: Performed by: INTERNAL MEDICINE

## 2022-06-08 PROCEDURE — 94660 CPAP INITIATION&MGMT: CPT

## 2022-06-08 RX ORDER — ISOSORBIDE DINITRATE 20 MG/1
20 TABLET ORAL 3 TIMES DAILY
Qty: 90 TABLET | Refills: 3 | Status: SHIPPED | OUTPATIENT
Start: 2022-06-08

## 2022-06-08 RX ORDER — DIPHENHYDRAMINE HYDROCHLORIDE 50 MG/ML
INJECTION INTRAMUSCULAR; INTRAVENOUS
Status: DISPENSED
Start: 2022-06-08 | End: 2022-06-08

## 2022-06-08 RX ORDER — HEPARIN SODIUM 1000 [USP'U]/ML
INJECTION, SOLUTION INTRAVENOUS; SUBCUTANEOUS
Status: DISPENSED
Start: 2022-06-08 | End: 2022-06-08

## 2022-06-08 RX ADMIN — CALCIUM ACETATE 667 MG: 667 CAPSULE ORAL at 12:39

## 2022-06-08 RX ADMIN — GABAPENTIN 200 MG: 100 CAPSULE ORAL at 13:26

## 2022-06-08 RX ADMIN — PANTOPRAZOLE SODIUM 40 MG: 40 TABLET, DELAYED RELEASE ORAL at 06:16

## 2022-06-08 RX ADMIN — NEPHROCAP 1 MG: 1 CAP ORAL at 12:39

## 2022-06-08 RX ADMIN — ISOSORBIDE DINITRATE 20 MG: 20 TABLET ORAL at 13:26

## 2022-06-08 RX ADMIN — DULOXETINE HYDROCHLORIDE 30 MG: 30 CAPSULE, DELAYED RELEASE ORAL at 12:39

## 2022-06-08 RX ADMIN — CLOPIDOGREL BISULFATE 75 MG: 75 TABLET ORAL at 12:39

## 2022-06-08 RX ADMIN — EPOETIN ALFA-EPBX 10000 UNITS: 10000 INJECTION, SOLUTION INTRAVENOUS; SUBCUTANEOUS at 10:11

## 2022-06-08 ASSESSMENT — PAIN SCALES - WONG BAKER: WONGBAKER_NUMERICALRESPONSE: 0

## 2022-06-08 NOTE — PLAN OF CARE
Problem: Discharge Planning  Goal: Discharge to home or other facility with appropriate resources  6/8/2022 1637 by Sangita Gao RN  Outcome: Completed  6/8/2022 1424 by Sangita Gao RN  Outcome: Progressing     Problem: Pain  Goal: Verbalizes/displays adequate comfort level or baseline comfort level  6/8/2022 1637 by Sangita Gao RN  Outcome: Completed  6/8/2022 1424 by Sangita Gao RN  Outcome: Progressing     Problem: Safety - Adult  Goal: Free from fall injury  6/8/2022 1637 by Sangita Gao RN  Outcome: Completed  6/8/2022 1424 by Sangita Gao RN  Outcome: Progressing     Problem: ABCDS Injury Assessment  Goal: Absence of physical injury  6/8/2022 1637 by Sangita Gao RN  Outcome: Completed  6/8/2022 1424 by Sangita Gao RN  Outcome: Progressing

## 2022-06-08 NOTE — PROGRESS NOTES
Treatment time: 3 hours  Net UF: 3300     Pre weight: 115.6kg (bed wt)  Post weight: 112.3kg (bed wt)  EDW: 107.5kg     Access used: Right CVC   Access function: well, tolerated 400 BFR     Medications or blood products given: reatcrit 10,000     Regular outpatient schedule: MWF     Summary of response to treatment: tolerated well     Copy of dialysis treatment record placed in chart, to be scanned into EMR.                06/08/22 0726   Vital Signs   BP (!) 146/76   Temp 97.1 °F (36.2 °C)   Heart Rate 57   Resp 16   Weight 254 lb 13.6 oz (115.6 kg)        06/08/22 1048   Vital Signs   BP (!) 114/57   Temp 98.1 °F (36.7 °C)   Heart Rate 66   Resp 16   Weight 248 lb 0.3 oz (112.5 kg)   Percent Weight Change -2.68

## 2022-06-08 NOTE — PROGRESS NOTES
VSS - afebrile; bradycardic. Pt is alert and oriented x 4 with history of fall. Assessment completed as charted. Bed is in lowest position with 2/4 bed rails raised, bed alarm turned on, wheels locked and call light within reach - patient wearing non-skid socks and verbalizes understanding to call out for assistance. No further requests at this time. Will continue to monitor.      Vitals:    06/07/22 2229   BP: (!) 147/75   Pulse: 56   Resp: 17   Temp: 97.6 °F (36.4 °C)   SpO2: 100%

## 2022-06-08 NOTE — PROGRESS NOTES
[facility] Progress Note           Subjective/   45y.o. year old male who we are seeing in consultation for ESKD on HD. HPI:  Last HD on 6/6 with 3.2 liters removed, post-weight of 112.5 kg. Seen on dialysis. Orders confirmed. Pre-weight at HD today 115.8 kg. States shortness of breath on and off, does not improve with dialysis. ROS:  No chest pain or abdominal pain. Objective/   GEN:  Chronically ill, BP (!) 142/70   Pulse 56   Temp 97.9 °F (36.6 °C) (Axillary)   Resp 19   Ht 5' 6\" (1.676 m)   Wt 263 lb 7.2 oz (119.5 kg)   SpO2 95%   BMI 42.52 kg/m²    HEENT: non-icteric, no JVD  CV: S1, S2 without m/r/g; no LE edema  RESP: CTA B without w/r/r; breathing wnl  ABD: +bs, soft, nt, no hsm  SKIN: warm, no rashes  ACCESS: Left [facility]    Data/  Recent Labs     06/06/22  0629 06/07/22  0649   WBC 10.1 8.0   HGB 10.0* 10.1*   HCT 29.6* 31.3*   MCV 89.4 91.1    257     Recent Labs     06/06/22  0629 06/07/22  0649   * 126*   K 4.5 4.9   CL 83* 91*   CO2 23 18*   GLUCOSE 150* 127*   PHOS  --  4.5   MG  --  1.90   BUN 64* 43*   CREATININE 8.1* 5.7*   LABGLOM 7* 10*   GFRAA 8* 13*     LHC on 6/7 ->   Mild to moderately increased filling pressures  Patent RCA stent with moderate to borderline severe in-stent restenosis  Severe circumflex and D1 stenosis  Continue medical management, consider outpatient high risk PCI of above territories pending outpatient clinical follow-up. Currently medical management seems best given comorbidities and need for additional fluid removal.  If PCI is pursued, will likely need general anesthesia. Patient had difficulty lying still on Cath Lab table.     Assessment/     # End stage kidney disease - on HD Mon-Wed-Fri     # Acute on chronic respiratory failure r/o sepsis - completed course of antibiotics     # sCHF/Ischemic cardiomyopathy - EF worse at ~20-25% this admission, decompensated    # CAD: s/p PATRICIA RCA 1/14/2022; s/p PATRICIA LAD 2015    # Bicuspid AV/severe AS: s/p TAVR 10/2019    # Hypertension - better controlled     # Anemia of chronic disease - DAVON with HD    # Hyponatremia - monitor with HD       Plan/     - HD today per MWF schedule with UF as tolerated with crit line monitoring, 3-4 L today   Under EDW of 107 kg from prior treatments this admission  - DAVON 10K qHD  - Trend labs, bp's

## 2022-06-08 NOTE — CARE COORDINATION
CASE MANAGEMENT DISCHARGE SUMMARY      Discharge to: home with wife; declines home care         IMM given: (date) 6/8/22         Transportation:    Family/car:        Confirmed discharge plan with:     Patient: yes      Family:  Yes   Name: Raji Miller number: [phone]           RN, name: Obi Anderson    Note: Discharging nurse to complete CELINE, reconcile AVS, and place final copy with patient's discharge packet. RN to ensure that written prescriptions for  Level II medications are sent with patient to the facility as per protocol.

## 2022-06-08 NOTE — CARE COORDINATION
Cm following. Pt from home with wife. Plans to return. Denies home care. Pt goes to [facility] for HD.

## 2022-06-08 NOTE — PROGRESS NOTES
06/08/22 0321   NIV Type   $NIV $Daily Charge   NIV Started/Stopped On   Equipment Type v60   Mode AVAPS   Mask Type Full face mask   Mask Size Large   Settings/Measurements   CPAP/EPAP 6 cmH2O   IPAP Min 18 cmH2O   IPAP Max 25 cmH2O   Vt (Set, mL) 700 mL   Resp 16   FiO2  30 %   Vt Exhaled 706 mL   Mask Leak (lpm) 30 lpm   Comfort Level Good   Using Accessory Muscles No   Alarm Settings   Alarms On Y

## 2022-06-09 ENCOUNTER — CARE COORDINATION (OUTPATIENT)
Dept: CASE MANAGEMENT | Age: 52
End: 2022-06-09

## 2022-06-10 ENCOUNTER — FOLLOWUP TELEPHONE ENCOUNTER (OUTPATIENT)
Dept: TELEMETRY | Age: 52
End: 2022-06-10

## 2022-06-10 ENCOUNTER — CARE COORDINATION (OUTPATIENT)
Dept: CASE MANAGEMENT | Age: 52
End: 2022-06-10

## 2022-06-10 NOTE — TELEPHONE ENCOUNTER
3rd and final Attempt; No Answer- Left HIPAA compliant voicemail with Non-Urgent Heart Failure Resource Line number for call back.       Britany King RN

## 2022-06-10 NOTE — TELEPHONE ENCOUNTER
1st Attempt; No Answer- Left HIPAA compliant voicemail with Non-Urgent Heart Failure Resource Line number for call back.        Jaky Ramirez RN

## 2022-06-10 NOTE — TELEPHONE ENCOUNTER
2nd Attempt; No Answer- Left HIPAA compliant voicemail with Non-Urgent Heart Failure Resource Line number for call back.        Pauly Huff RN

## 2022-06-10 NOTE — CARE COORDINATION
[address] Transitions Initial Follow Up Call    Call within 2 business days of discharge: Yes    Patient: Ike Sheikh Patient : 1970   MRN: [id number]  Reason for Admission: CKD   Discharge Date: 22 RARS: Readmission Risk Score: 47.4 ( )      Last Discharge [facility]       Complaint Diagnosis Description Type Department Provider    22 Altered Mental Status Shortness of breath . .. ED to Hosp-Admission (Discharged) (ADMITTED) [place] [address] Sammy Coleman MD; Vicenta Spence. .. Final attempt made to reach patient for post hospital follow up call. Left a voice message for patient with my contact information and informed of final outreach attempt.            Care Transitions 24 Hour Call    Do you have all of your prescriptions and are they filled?: Yes  Post Acute Services:  [facility] Transitions Interventions         Follow Up  Future Appointments   Date Time Provider Nelly Darby   2022  2:15 PM JAY Collazo - CNP [facility]       Priyank RALPH, RN, [facility]  Care Transition Nurse  [phone] mobile

## 2022-06-15 ENCOUNTER — TELEPHONE (OUTPATIENT)
Dept: CASE MANAGEMENT | Age: 52
End: 2022-06-15

## 2022-06-15 DIAGNOSIS — R93.89 ABNORMAL CT SCAN: Primary | ICD-10-CM

## 2022-06-15 NOTE — TELEPHONE ENCOUNTER
Per imaging report CT CHEST PULM EMB 5/29/2022:    Mediastinum: There is mediastinal adenopathy with the largest mediastinal   lymph node in the precarinal region measuring 2.0 cm in short axis diameter. There is overall increased adenopathy when compared to the prior study. Mediastinal adenopathy possibly reactive in nature.  Recommend follow-up CT   chest in 1-3 months to confirm resolution unless clinically indicated sooner.      Thank you,  uYli Garsia RN  [facility] Lung Navigator  [phone]

## 2022-06-16 NOTE — DISCHARGE SUMMARY
Hospital Medicine Discharge Summary    [facility]      Patient's PCP: Yaw Kitchen MD    Admit Date: 5/29/2022     Discharge Date: 6/8/2022      Admitting Provider: Clarissa Castro MD     Discharge Provider: JASE Monroy     Discharge Diagnoses: Active Hospital Problems    Diagnosis     Coronary artery disease of native artery of native heart with stable angina pectoris ([facility] [address] [I25.118]      Priority: High    Altered mental status [R41.82]      Priority: Medium    Hypoglycemia [E16.2]      Priority: Medium    History of transcatheter aortic valve replacement (TAVR) [Z95.2]      Priority: Medium    ESRD (end stage renal disease) ([facility] [address] [N18.6]     Elevated troponin [R77.8]     PNA (pneumonia) [J18.9]        The patient was seen and examined on day of discharge and this discharge summary is in conjunction with any daily progress note from day of discharge. Hospital Course:   45 y.o. male  with multiple medical issueswho presented to [facility] with confusion/sob/chest pain. Pt states he has chronic sob and chest pain(has element of anxiety) and yesterday he developed inc'd sob that was fairly sudden onset and continued into the night. He also noted ongoing substernal chest pain, nonradiating that would last a few hours(would subside on its own with deep breathing).   No n/v/diarrhea but did note subjective fevers/chills at home.  -he is on 3L at home chronically  - he did not miss HD this week and has been med compliant  -he noted some diaphoresis yesterday and today  -he notes dec'd appetite in the past week but has not been checking his sugars(broke his glucometer)  -apparently was behaving aggressively/growling at his wife and appeared confused, so EMS was called.  -he states he quit smoking 1 month ago     ER course: sugars noted in the 46s, started d10 ggt, pt ao x 3, coherent currently and not agitated    HCAP, with sepsis present on arrival. [facility] guiding culture data.  Completed course of empiric Vanc/Zosyn.  Complicated by diarrhea, likely abx-induced. Janie Ribera has h/o c.diff. Ginette Fisher improved now that he is off ABX.  Probiotic.       Pleural effusions, L>R. Adali Nations due to VO from acute on chronic systolic and diastolic CHF, ESRD.  F/u CXR.     ESRD on HD.  Nephrology removed fluid, lowered target weight, added extra ultrafiltration session on 6/2, and continued with M/W/F schedule/     Chronic hypoxic respiratory failure.  Baseline 3-4LNC.     Chest pain, with known h/o CAD.   Appreciate cardiology input.  Stress test showed mostly scar, some ischemia, similar to prior.  TTE showed EF 20-25% with RWMAs (EF 40-45% in 1/2022).  Continue clopidogrel, statin, metoprolol, increased ISDN.         DM2.  Complicated by hypoglycemia on admission, with acute metabolic encephalopathy.  Stopped insulin and his sugars were fine.  F/u with PCP.     HTN - w/out known CAD and no evidence of active signs/sxs of ischemia/failure. Currently controlled on home meds w/ vitals reviewed and documented as above. Cardiology started entresto.     Cardiomyopathy.  Likely ischemic.  Metoprolol and entresto as above.  Cardiology could consider an AICD in 90 days if no improvement.      PAF.  Apixaban, metoprolol.          Physical Exam Performed:     BP (!) 128/58   Pulse 61   Temp 97.5 °F (36.4 °C) (Axillary)   Resp 18   Ht 5' 6\" (1.676 m)   Wt 263 lb 7.2 oz (119.5 kg)   SpO2 94%   BMI 42.52 kg/m²       General appearance:  No apparent distress, appears stated age and cooperative. HEENT:  Normal cephalic, atraumatic without obvious deformity. Pupils equal, round, and reactive to light. Extra ocular muscles intact. Conjunctivae/corneas clear. Neck: Supple, with full range of motion. No jugular venous distention. Trachea midline. Respiratory:  Normal respiratory effort. Clear to auscultation, bilaterally without Rales/Wheezes/Rhonchi.   Cardiovascular:  Regular rate and rhythm with normal S1/S2 without murmurs, rubs or gallops. Abdomen: Soft, non-tender, non-distended with normal bowel sounds. Musculoskeletal:  No clubbing, cyanosis or edema bilaterally. Full range of motion without deformity. Skin: Skin color, texture, turgor normal.  No rashes or lesions. Neurologic:  Neurovascularly intact without any focal sensory/motor deficits. Cranial nerves: II-XII intact, grossly non-focal.  Psychiatric:  Alert and oriented, thought content appropriate, normal insight  Capillary Refill: Brisk,< 3 seconds   Peripheral Pulses: +2 palpable, equal bilaterally       Labs: For convenience and continuity at follow-up the following most recent labs are provided:      CBC:    Lab Results   Component Value Date    WBC 5.8 06/25/2022    HGB 9.4 06/25/2022    HCT 29.1 06/25/2022     06/25/2022       Renal:    Lab Results   Component Value Date     06/25/2022    K 5.0 06/25/2022    K 5.0 06/25/2022    CL 93 06/25/2022    CO2 24 06/25/2022    BUN 32 06/25/2022    CREATININE 5.8 06/25/2022    CALCIUM 9.2 06/25/2022    PHOS 5.9 06/22/2022         Significant Diagnostic Studies    Radiology:   XR CHEST PORTABLE   Final Result   Stable left basilar pleural effusion. Left perihilar atelectasis, with mild improvement in aeration. NM Cardiac Stress Test Nuclear Imaging   Final Result      CT HEAD WO CONTRAST   Final Result   No acute intracranial abnormality. CT CHEST PULMONARY EMBOLISM W CONTRAST   Final Result   Negative for acute pulmonary embolus. Bilateral patchy areas of atelectasis or infiltrate in the lungs most   pronounced in the left lower lobe. Bilateral pleural effusions (left greater than right). Mediastinal adenopathy possibly reactive in nature. Recommend follow-up CT   chest in 1-3 months to confirm resolution unless clinically indicated sooner. XR CHEST PORTABLE   Final Result   1.  Central predominant edema potentially of cardiac or noncardiogenic origin. Pneumonia could appear similar. 2. Increased left basilar airspace opacity due in part to passive atelectasis   given at least trace to small left pleural effusion. Underlying rounded   atelectasis is likely present given the prior CT appearance, and superimposed   pneumonia and aspiration are not excluded. 3. At least trace right pleural effusion. Consults:     IP CONSULT TO NEPHROLOGY  IP CONSULT TO CARDIOLOGY  IP CONSULT TO PHARMACY  IP CONSULT TO CASE MANAGEMENT  IP CONSULT TO HEART FAILURE NURSE/COORDINATOR  IP CONSULT TO DIETITIAN    Disposition:  Home     Condition at Discharge: Stable    Discharge Instructions/Follow-up:    Follow-up with PCP in 1 week  Follow-up with Cardiology as scheduled. Maintain compliance with dialysis schedule. Code Status:  Full Code     Activity: activity as tolerated    Diet: low fat, low cholesterol diet      Discharge Medications:     Discharge Medication List as of 6/8/2022  4:08 PM           Details   sacubitril-valsartan (ENTRESTO) 24-26 MG per tablet Take 1 tablet by mouth 2 times daily, Disp-60 tablet, R-0Normal              Details   isosorbide dinitrate (ISORDIL) 20 MG tablet Take 1 tablet by mouth 3 times daily, Disp-90 tablet, R-3Normal              Details   oxyCODONE-acetaminophen (PERCOCET) 7.5-325 MG per tablet Take 1 tablet by mouth every 6 hours as needed (pain) for up to 30 days. , Disp-120 tablet, R-0Normal      clopidogrel (PLAVIX) 75 MG tablet Take 1 tablet by mouth daily, Disp-90 tablet, R-3Normal      cyclobenzaprine (FLEXERIL) 10 MG tablet TAKE 1 TABLET BY MOUTH EVERY 8 HOURS AS NEEDED, Disp-90 tablet, R-10Normal      pantoprazole (PROTONIX) 40 MG tablet TAKE 1 TABLET BY MOUTH EVERY MORNING BEFORE BREAKFAST, Disp-30 tablet, R-10Normal      !! QUEtiapine (SEROQUEL) 50 MG tablet TAKE 1 TABLET BY MOUTH IN THE EVENING, Disp-30 tablet, R-10Normal      !!  QUEtiapine (SEROQUEL) 25 MG tablet Take 1 tablet by mouth nightly, Disp-60 tablet, R-3Normal      docusate sodium (DOK) 100 MG capsule Take 1 capsule by mouth daily, Disp-30 capsule, R-5Normal      LINZESS 145 MCG capsule TAKE 1 CAPSULE BY MOUTH IN THE MORNING BEFORE BREAKFAST, Disp-30 capsule, R-10Normal      fluticasone (FLONASE) 50 MCG/ACT nasal spray SHAKE LIQUID AND USE 2 SPRAYS IN EACH NOSTRIL DAILY, Disp-48 g, R-0Normal      DULoxetine (CYMBALTA) 30 MG extended release capsule TAKE 1 CAPSULE BY MOUTH EVERY DAY, Disp-30 capsule, R-10Normal      mineral oil liquid Take 30 mLs by mouth daily as needed for Constipation, Oral, DAILY PRN Starting Wed 3/9/2022, Disp-300 mL, R-0, Normal      sennosides-docusate sodium (SENOKOT-S) 8.6-50 MG tablet Take 1 tablet by mouth daily, Disp-10 tablet, R-0Normal      metoprolol succinate (TOPROL XL) 50 MG extended release tablet Take 1 tablet by mouth in the morning and at bedtime, Disp-60 tablet, R-1Normal      apixaban (ELIQUIS) 5 MG TABS tablet Take 1 tablet by mouth 2 times daily, Disp-60 tablet, R-1Normal      pravastatin (PRAVACHOL) 40 MG tablet Take 1 tablet by mouth daily, Disp-90 tablet, R-3Normal      Continuous Blood Gluc Sensor (DEXCOM G6 SENSOR) MISC Every 10 days, Disp-9 each, R-3Print      Continuous Blood Gluc Transmit (DEXCOM G6 TRANSMITTER) MISC 1 each by Does not apply route every 3 months, Disp-1 each, R-3Print      Continuous Blood Gluc  (DEXCOM G6 ) ADAM 1 each by Does not apply route Daily with lunch, Disp-1 each, R-0Print      B Complex-C-Folic Acid (VIRT-CAPS) 1 MG CAPS TAKE 1 CAPSULE BY MOUTH EVERY DAY, Disp-90 capsule, R-1Normal      Calcium Acetate, Phos Binder, 667 MG CAPS TAKE 1 CAPSULE BY MOUTH THREE TIMES DAILY WITH MEALS, Disp-90 capsule, R-3Normal      nitroGLYCERIN (NITROSTAT) 0.4 MG SL tablet DISSOLVE 1 TABLET UNDER THE TONGUE AS NEEDED FOR CHEST PAIN EVERY 5 MINUTES UP TO 3 TIMES.  IF NO RELIEF CALL 911., Disp-25 tablet, R-10Normal      vitamin D (ERGOCALCIFEROL) 29226 units CAPS capsule TK 1 C PO WEEKLY, R-11Historical Med      Tiotropium Bromide-Olodaterol (STIOLTO RESPIMAT) 2.5-2.5 MCG/ACT AERS Inhale 2 puffs into the lungs daily, Disp-2 Inhaler, R-02 samples given:  Lot #[id number], Exp 7/21 & Lot #[id number], Exp 7/21NO PRINT      Blood Glucose Monitoring Suppl ADAM Disp-1 Device, R-0, NormalUSE AS DIRECTED. Alcohol Swabs PADS Disp-300 each, R-3, NormalUSE AS DIRECTED      albuterol sulfate  (90 Base) MCG/ACT inhaler Inhale 2 puffs into the lungs every 6 hours as needed for Wheezing, Disp-1 Inhaler, R-3Print      ipratropium-albuterol (DUONEB) 0.5-2.5 (3) MG/3ML SOLN nebulizer solution Inhale 3 mLs into the lungs every 6 hours as needed for Shortness of Breath, Disp-360 mL, R-1Print      calcium carbonate (TUMS) 500 MG chewable tablet Take 1 tablet by mouth 3 times daily as needed for Heartburn. !! - Potential duplicate medications found. Please discuss with provider. Time Spent on discharge is more than 30 minutes in the examination, evaluation, counseling and review of medications and discharge plan. Signed:    JASE Drummond   6/25/2022      Thank you Tremaine Browning MD for the opportunity to be involved in this patient's care. If you have any questions or concerns, please feel free to contact me at [phone].

## 2022-06-18 NOTE — PROGRESS NOTES
Physician Progress Note      PATIENT:               Ritu Silverman  CSN #:                  [id number]  :                       1970  ADMIT DATE:       2022 9:23 AM  DISCH DATE:        2022 5:03 PM  RESPONDING  PROVIDER #:        Alvina Wilder MD          QUERY TEXT:    Pt admitted with sepsis. Noted documentation of NSTEMI by Cardiology   consultant. If possible, please document in progress notes and discharge   summary:    The medical record reflects the following:  Risk Factors: ESRD, CAD, prior stents, DM, HTN, sepsis  Clinical Indicators: Patent RCA stent with moderate to borderline severe   in-stent restenosis and Severe circumflex and D1 stenosis per [facility], troponin   0.12, 0.10, 0.09  Treatment: serial labs, LHC/RHC, Cardiology consult, medical management of CAD    Thank you,  Erich Soto RN, CDS  [email or web address]  Options provided:  -- NSTEMI confirmed present on admission  -- NSTEMI confirmed not present on admission  -- NSTEMI ruled out, demand ischemia only  -- Other - I will add my own diagnosis  -- Disagree - Not applicable / Not valid  -- Disagree - Clinically unable to determine / Unknown  -- Refer to Clinical Documentation Reviewer    PROVIDER RESPONSE TEXT:    The diagnosis of NSTEMI was ruled out, demand ischemia only.     Query created by: Mari Pichardo on 2022 1:43 PM      Electronically signed by:  Alvina Wilder MD 2022 9:33 PM

## 2022-06-20 NOTE — PLAN OF CARE
----- Message from Ignacio Tiwari MD sent at 6/17/2022  2:02 PM CDT -----  Advise patient that MR was indicated   Problem: ACTIVITY INTOLERANCE/IMPAIRED MOBILITY  Goal: Mobility/activity is maintained at optimum level for patient  Outcome: Ongoing  Patient's EF (Ejection Fraction) is greater than 40%    Patient has a past medical history of Aortic stenosis; Arthritis; Asthma; Bilateral hilar adenopathy syndrome; CAD (coronary artery disease); CHF (congestive heart failure) ([facility]); COPD (chronic obstructive pulmonary disease) ([facility] CRF (chronic renal failure); Depression; Diabetes mellitus ([facility] Emphysema of lung ([facility] Gastric ulcer; GERD (gastroesophageal reflux disease); Heart valve problem; Hemodialysis patient [facility]); Hyperlipidemia; Hypertension; Neuromuscular disorder ([facility] Numbness and tingling in left arm; Pneumonia; PONV (postoperative nausea and vomiting); Prolonged emergence from general anesthesia; Sleep apnea; Stroke [facility]); TIA (transient ischemic attack); and Unspecified diseases of blood and blood-forming organs. Comorbidities reviewed and education provided. Patient and family's stated goal of care: reduce lower extremity edema prior to discharge    Patient's current functional capacity:  No limitation of physical activity. Ordinary physical activity does not cause undue fatigue, palpitation, dyspnea. Pt resting in bed at this time on room air. Pt denies shortness of breath. Pt with nonpitting lower extremity edema. Patient's weights and intake/output reviewed:    Patient Vitals for the past 96 hrs (Last 3 readings):   Weight   09/23/18 0435 211 lb (95.7 kg)   09/22/18 1223 209 lb 14.1 oz (95.2 kg)   09/22/18 0800 219 lb 9.3 oz (99.6 kg)       Intake/Output Summary (Last 24 hours) at 09/23/18 2236  Last data filed at 09/23/18 1832   Gross per 24 hour   Intake              840 ml   Output               30 ml   Net              810 ml       Patient provided with education on CHF signs/symptoms, causes, discharge medications, daily weights, low sodium diet, activity, and follow-up.  Notified patient to call the doctor post discharge if patient experiences shortness of breath, chest pain, swelling, cough, or weight gain of three pounds in a day/five pounds in a week. Also notified patient to call the doctor with dizziness, increased fatigue, decreased or difficulty urinating. Pt demonstrates understanding. No additional questions at this time.     Education Time: 10 Minutes

## 2022-06-21 ENCOUNTER — HOSPITAL ENCOUNTER (INPATIENT)
Age: 52
LOS: 7 days | Discharge: HOME HEALTH | DRG: 189 | End: 2022-06-28
Attending: EMERGENCY MEDICINE | Admitting: INTERNAL MEDICINE
Payer: COMMERCIAL

## 2022-06-21 ENCOUNTER — APPOINTMENT (OUTPATIENT)
Dept: GENERAL RADIOLOGY | Age: 52
DRG: 189 | End: 2022-06-21
Payer: COMMERCIAL

## 2022-06-21 DIAGNOSIS — J96.01 ACUTE RESPIRATORY FAILURE WITH HYPOXIA (HCC): Primary | ICD-10-CM

## 2022-06-21 DIAGNOSIS — N18.9 CHRONIC KIDNEY DISEASE, UNSPECIFIED CKD STAGE: ICD-10-CM

## 2022-06-21 DIAGNOSIS — I50.9 ACUTE ON CHRONIC CONGESTIVE HEART FAILURE, UNSPECIFIED HEART FAILURE TYPE (HCC): ICD-10-CM

## 2022-06-21 LAB
A/G RATIO: 1.6 (ref 1.1–2.2)
ALBUMIN SERPL-MCNC: 4.5 G/DL (ref 3.4–5)
ALP BLD-CCNC: 121 U/L (ref 40–129)
ALT SERPL-CCNC: 13 U/L (ref 10–40)
ANION GAP SERPL CALCULATED.3IONS-SCNC: 19 MMOL/L (ref 3–16)
ANION GAP SERPL CALCULATED.3IONS-SCNC: 21 MMOL/L (ref 3–16)
AST SERPL-CCNC: 30 U/L (ref 15–37)
BASE EXCESS VENOUS: -3.8 MMOL/L (ref -3–3)
BASOPHILS ABSOLUTE: 0.1 K/UL (ref 0–0.2)
BASOPHILS RELATIVE PERCENT: 0.8 %
BILIRUB SERPL-MCNC: 0.3 MG/DL (ref 0–1)
BUN BLDV-MCNC: 56 MG/DL (ref 7–20)
BUN BLDV-MCNC: 57 MG/DL (ref 7–20)
CALCIUM SERPL-MCNC: 9.3 MG/DL (ref 8.3–10.6)
CALCIUM SERPL-MCNC: 9.4 MG/DL (ref 8.3–10.6)
CARBOXYHEMOGLOBIN: 2.1 % (ref 0–1.5)
CHLORIDE BLD-SCNC: 89 MMOL/L (ref 99–110)
CHLORIDE BLD-SCNC: 92 MMOL/L (ref 99–110)
CO2: 18 MMOL/L (ref 21–32)
CO2: 21 MMOL/L (ref 21–32)
CREAT SERPL-MCNC: 7.9 MG/DL (ref 0.9–1.3)
CREAT SERPL-MCNC: 8.7 MG/DL (ref 0.9–1.3)
EKG ATRIAL RATE: 124 BPM
EKG DIAGNOSIS: NORMAL
EKG P AXIS: 69 DEGREES
EKG P-R INTERVAL: 146 MS
EKG Q-T INTERVAL: 308 MS
EKG QRS DURATION: 98 MS
EKG QTC CALCULATION (BAZETT): 442 MS
EKG R AXIS: 34 DEGREES
EKG T AXIS: 78 DEGREES
EKG VENTRICULAR RATE: 124 BPM
EOSINOPHILS ABSOLUTE: 0.3 K/UL (ref 0–0.6)
EOSINOPHILS RELATIVE PERCENT: 1.7 %
GFR AFRICAN AMERICAN: 8
GFR AFRICAN AMERICAN: 9
GFR NON-AFRICAN AMERICAN: 6
GFR NON-AFRICAN AMERICAN: 7
GLUCOSE BLD-MCNC: 181 MG/DL (ref 70–99)
GLUCOSE BLD-MCNC: 201 MG/DL (ref 70–99)
GLUCOSE BLD-MCNC: 207 MG/DL (ref 70–99)
GLUCOSE BLD-MCNC: 244 MG/DL (ref 70–99)
HCO3 VENOUS: 23.5 MMOL/L (ref 23–29)
HCT VFR BLD CALC: 34.5 % (ref 40.5–52.5)
HEMOGLOBIN: 10.9 G/DL (ref 13.5–17.5)
LYMPHOCYTES ABSOLUTE: 0.4 K/UL (ref 1–5.1)
LYMPHOCYTES RELATIVE PERCENT: 2.6 %
MAGNESIUM: 2 MG/DL (ref 1.8–2.4)
MCH RBC QN AUTO: 29 PG (ref 26–34)
MCHC RBC AUTO-ENTMCNC: 31.6 G/DL (ref 31–36)
MCV RBC AUTO: 91.8 FL (ref 80–100)
METHEMOGLOBIN VENOUS: 0.5 %
MONOCYTES ABSOLUTE: 0.7 K/UL (ref 0–1.3)
MONOCYTES RELATIVE PERCENT: 3.9 %
NEUTROPHILS ABSOLUTE: 15.1 K/UL (ref 1.7–7.7)
NEUTROPHILS RELATIVE PERCENT: 91 %
O2 SAT, VEN: 62 %
O2 THERAPY: ABNORMAL
PCO2, VEN: 52.1 MMHG (ref 40–50)
PDW BLD-RTO: 16.4 % (ref 12.4–15.4)
PERFORMED ON: ABNORMAL
PERFORMED ON: ABNORMAL
PH VENOUS: 7.27 (ref 7.35–7.45)
PHOSPHORUS: 5.7 MG/DL (ref 2.5–4.9)
PLATELET # BLD: 377 K/UL (ref 135–450)
PMV BLD AUTO: 7.8 FL (ref 5–10.5)
PO2, VEN: 37.3 MMHG (ref 25–40)
POTASSIUM SERPL-SCNC: 4.9 MMOL/L (ref 3.5–5.1)
POTASSIUM SERPL-SCNC: 5.6 MMOL/L (ref 3.5–5.1)
PRO-BNP: ABNORMAL PG/ML (ref 0–124)
RBC # BLD: 3.76 M/UL (ref 4.2–5.9)
SODIUM BLD-SCNC: 129 MMOL/L (ref 136–145)
SODIUM BLD-SCNC: 131 MMOL/L (ref 136–145)
TCO2 CALC VENOUS: 25 MMOL/L
TOTAL PROTEIN: 7.4 G/DL (ref 6.4–8.2)
TROPONIN: 0.1 NG/ML
WBC # BLD: 16.6 K/UL (ref 4–11)

## 2022-06-21 PROCEDURE — 6360000002 HC RX W HCPCS: Performed by: EMERGENCY MEDICINE

## 2022-06-21 PROCEDURE — 93005 ELECTROCARDIOGRAM TRACING: CPT | Performed by: EMERGENCY MEDICINE

## 2022-06-21 PROCEDURE — 94640 AIRWAY INHALATION TREATMENT: CPT

## 2022-06-21 PROCEDURE — 6360000002 HC RX W HCPCS: Performed by: INTERNAL MEDICINE

## 2022-06-21 PROCEDURE — 6370000000 HC RX 637 (ALT 250 FOR IP): Performed by: INTERNAL MEDICINE

## 2022-06-21 PROCEDURE — 6370000000 HC RX 637 (ALT 250 FOR IP): Performed by: EMERGENCY MEDICINE

## 2022-06-21 PROCEDURE — 94761 N-INVAS EAR/PLS OXIMETRY MLT: CPT

## 2022-06-21 PROCEDURE — 96374 THER/PROPH/DIAG INJ IV PUSH: CPT

## 2022-06-21 PROCEDURE — 2700000000 HC OXYGEN THERAPY PER DAY

## 2022-06-21 PROCEDURE — 2580000003 HC RX 258: Performed by: INTERNAL MEDICINE

## 2022-06-21 PROCEDURE — 90935 HEMODIALYSIS ONE EVALUATION: CPT

## 2022-06-21 PROCEDURE — 80053 COMPREHEN METABOLIC PANEL: CPT

## 2022-06-21 PROCEDURE — 93010 ELECTROCARDIOGRAM REPORT: CPT | Performed by: INTERNAL MEDICINE

## 2022-06-21 PROCEDURE — 84100 ASSAY OF PHOSPHORUS: CPT

## 2022-06-21 PROCEDURE — 2060000000 HC ICU INTERMEDIATE R&B

## 2022-06-21 PROCEDURE — 94660 CPAP INITIATION&MGMT: CPT

## 2022-06-21 PROCEDURE — 36415 COLL VENOUS BLD VENIPUNCTURE: CPT

## 2022-06-21 PROCEDURE — 84484 ASSAY OF TROPONIN QUANT: CPT

## 2022-06-21 PROCEDURE — 83880 ASSAY OF NATRIURETIC PEPTIDE: CPT

## 2022-06-21 PROCEDURE — 85025 COMPLETE CBC W/AUTO DIFF WBC: CPT

## 2022-06-21 PROCEDURE — 71045 X-RAY EXAM CHEST 1 VIEW: CPT

## 2022-06-21 PROCEDURE — 99285 EMERGENCY DEPT VISIT HI MDM: CPT

## 2022-06-21 PROCEDURE — 83036 HEMOGLOBIN GLYCOSYLATED A1C: CPT

## 2022-06-21 PROCEDURE — 83735 ASSAY OF MAGNESIUM: CPT

## 2022-06-21 PROCEDURE — 2500000003 HC RX 250 WO HCPCS: Performed by: INTERNAL MEDICINE

## 2022-06-21 PROCEDURE — 5A1D70Z PERFORMANCE OF URINARY FILTRATION, INTERMITTENT, LESS THAN 6 HOURS PER DAY: ICD-10-PCS | Performed by: INTERNAL MEDICINE

## 2022-06-21 PROCEDURE — 82803 BLOOD GASES ANY COMBINATION: CPT

## 2022-06-21 RX ORDER — INSULIN LISPRO 100 [IU]/ML
0-6 INJECTION, SOLUTION INTRAVENOUS; SUBCUTANEOUS
Status: DISCONTINUED | OUTPATIENT
Start: 2022-06-21 | End: 2022-06-22

## 2022-06-21 RX ORDER — ISOSORBIDE DINITRATE 20 MG/1
20 TABLET ORAL 3 TIMES DAILY
Status: DISCONTINUED | OUTPATIENT
Start: 2022-06-21 | End: 2022-06-28 | Stop reason: HOSPADM

## 2022-06-21 RX ORDER — QUETIAPINE FUMARATE 25 MG/1
50 TABLET, FILM COATED ORAL NIGHTLY
Status: DISCONTINUED | OUTPATIENT
Start: 2022-06-21 | End: 2022-06-28 | Stop reason: HOSPADM

## 2022-06-21 RX ORDER — PRAVASTATIN SODIUM 40 MG
40 TABLET ORAL DAILY
Status: DISCONTINUED | OUTPATIENT
Start: 2022-06-21 | End: 2022-06-28 | Stop reason: HOSPADM

## 2022-06-21 RX ORDER — SODIUM CHLORIDE 0.9 % (FLUSH) 0.9 %
10 SYRINGE (ML) INJECTION PRN
Status: DISCONTINUED | OUTPATIENT
Start: 2022-06-21 | End: 2022-06-28 | Stop reason: HOSPADM

## 2022-06-21 RX ORDER — PANTOPRAZOLE SODIUM 40 MG/1
40 TABLET, DELAYED RELEASE ORAL
Status: DISCONTINUED | OUTPATIENT
Start: 2022-06-21 | End: 2022-06-28 | Stop reason: HOSPADM

## 2022-06-21 RX ORDER — METOPROLOL SUCCINATE 50 MG/1
50 TABLET, EXTENDED RELEASE ORAL 2 TIMES DAILY
Status: DISCONTINUED | OUTPATIENT
Start: 2022-06-21 | End: 2022-06-28 | Stop reason: HOSPADM

## 2022-06-21 RX ORDER — CLOPIDOGREL BISULFATE 75 MG/1
75 TABLET ORAL DAILY
Status: DISCONTINUED | OUTPATIENT
Start: 2022-06-21 | End: 2022-06-22

## 2022-06-21 RX ORDER — CALCIUM CARBONATE 200(500)MG
1000 TABLET,CHEWABLE ORAL 3 TIMES DAILY PRN
Status: DISCONTINUED | OUTPATIENT
Start: 2022-06-21 | End: 2022-06-28 | Stop reason: HOSPADM

## 2022-06-21 RX ORDER — LANOLIN ALCOHOL/MO/W.PET/CERES
3 CREAM (GRAM) TOPICAL NIGHTLY PRN
Status: DISCONTINUED | OUTPATIENT
Start: 2022-06-21 | End: 2022-06-28 | Stop reason: HOSPADM

## 2022-06-21 RX ORDER — OXYCODONE AND ACETAMINOPHEN 7.5; 325 MG/1; MG/1
1 TABLET ORAL EVERY 6 HOURS PRN
Status: DISCONTINUED | OUTPATIENT
Start: 2022-06-21 | End: 2022-06-28 | Stop reason: HOSPADM

## 2022-06-21 RX ORDER — INSULIN LISPRO 100 [IU]/ML
0-3 INJECTION, SOLUTION INTRAVENOUS; SUBCUTANEOUS NIGHTLY
Status: DISCONTINUED | OUTPATIENT
Start: 2022-06-21 | End: 2022-06-22

## 2022-06-21 RX ORDER — DULOXETIN HYDROCHLORIDE 30 MG/1
30 CAPSULE, DELAYED RELEASE ORAL DAILY
Status: DISCONTINUED | OUTPATIENT
Start: 2022-06-21 | End: 2022-06-28 | Stop reason: HOSPADM

## 2022-06-21 RX ORDER — FUROSEMIDE 10 MG/ML
60 INJECTION INTRAMUSCULAR; INTRAVENOUS ONCE
Status: COMPLETED | OUTPATIENT
Start: 2022-06-21 | End: 2022-06-21

## 2022-06-21 RX ORDER — OXYCODONE AND ACETAMINOPHEN 7.5; 325 MG/1; MG/1
1 TABLET ORAL ONCE
Status: COMPLETED | OUTPATIENT
Start: 2022-06-21 | End: 2022-06-21

## 2022-06-21 RX ORDER — TRAZODONE HYDROCHLORIDE 50 MG/1
50 TABLET ORAL NIGHTLY
Status: DISCONTINUED | OUTPATIENT
Start: 2022-06-21 | End: 2022-06-22

## 2022-06-21 RX ORDER — ACETAMINOPHEN 650 MG/1
650 SUPPOSITORY RECTAL EVERY 6 HOURS PRN
Status: DISCONTINUED | OUTPATIENT
Start: 2022-06-21 | End: 2022-06-28 | Stop reason: HOSPADM

## 2022-06-21 RX ORDER — DEXTROSE MONOHYDRATE 50 MG/ML
100 INJECTION, SOLUTION INTRAVENOUS PRN
Status: DISCONTINUED | OUTPATIENT
Start: 2022-06-21 | End: 2022-06-28 | Stop reason: HOSPADM

## 2022-06-21 RX ORDER — ONDANSETRON 4 MG/1
4 TABLET, ORALLY DISINTEGRATING ORAL EVERY 8 HOURS PRN
Status: DISCONTINUED | OUTPATIENT
Start: 2022-06-21 | End: 2022-06-28 | Stop reason: HOSPADM

## 2022-06-21 RX ORDER — ONDANSETRON 2 MG/ML
4 INJECTION INTRAMUSCULAR; INTRAVENOUS EVERY 6 HOURS PRN
Status: DISCONTINUED | OUTPATIENT
Start: 2022-06-21 | End: 2022-06-28 | Stop reason: HOSPADM

## 2022-06-21 RX ORDER — CALCIUM ACETATE 667 MG/1
1 CAPSULE ORAL
Status: DISCONTINUED | OUTPATIENT
Start: 2022-06-21 | End: 2022-06-28 | Stop reason: HOSPADM

## 2022-06-21 RX ORDER — LORAZEPAM 0.5 MG/1
0.5 TABLET ORAL EVERY 6 HOURS PRN
Status: DISCONTINUED | OUTPATIENT
Start: 2022-06-21 | End: 2022-06-28 | Stop reason: HOSPADM

## 2022-06-21 RX ORDER — SODIUM CHLORIDE 9 MG/ML
INJECTION, SOLUTION INTRAVENOUS PRN
Status: DISCONTINUED | OUTPATIENT
Start: 2022-06-21 | End: 2022-06-28 | Stop reason: HOSPADM

## 2022-06-21 RX ORDER — ACETAMINOPHEN 325 MG/1
650 TABLET ORAL EVERY 6 HOURS PRN
Status: DISCONTINUED | OUTPATIENT
Start: 2022-06-21 | End: 2022-06-28 | Stop reason: HOSPADM

## 2022-06-21 RX ORDER — DOCUSATE SODIUM 100 MG/1
100 CAPSULE, LIQUID FILLED ORAL DAILY
Status: DISCONTINUED | OUTPATIENT
Start: 2022-06-21 | End: 2022-06-28 | Stop reason: HOSPADM

## 2022-06-21 RX ADMIN — APIXABAN 5 MG: 5 TABLET, FILM COATED ORAL at 20:04

## 2022-06-21 RX ADMIN — FUROSEMIDE 60 MG: 10 INJECTION, SOLUTION INTRAMUSCULAR; INTRAVENOUS at 03:19

## 2022-06-21 RX ADMIN — Medication 10 ML: at 20:05

## 2022-06-21 RX ADMIN — CALCIUM ACETATE 667 MG: 667 CAPSULE ORAL at 17:20

## 2022-06-21 RX ADMIN — TIOTROPIUM BROMIDE AND OLODATEROL 2 PUFF: 3.124; 2.736 SPRAY, METERED RESPIRATORY (INHALATION) at 08:35

## 2022-06-21 RX ADMIN — METOPROLOL SUCCINATE 50 MG: 50 TABLET, EXTENDED RELEASE ORAL at 20:05

## 2022-06-21 RX ADMIN — ISOSORBIDE DINITRATE 20 MG: 20 TABLET ORAL at 20:05

## 2022-06-21 RX ADMIN — PANTOPRAZOLE SODIUM 40 MG: 40 TABLET, DELAYED RELEASE ORAL at 08:07

## 2022-06-21 RX ADMIN — DULOXETINE HYDROCHLORIDE 30 MG: 30 CAPSULE, DELAYED RELEASE ORAL at 08:07

## 2022-06-21 RX ADMIN — PRAVASTATIN SODIUM 40 MG: 40 TABLET ORAL at 08:07

## 2022-06-21 RX ADMIN — ISOSORBIDE DINITRATE 20 MG: 20 TABLET ORAL at 08:07

## 2022-06-21 RX ADMIN — APIXABAN 5 MG: 5 TABLET, FILM COATED ORAL at 08:07

## 2022-06-21 RX ADMIN — QUETIAPINE FUMARATE 50 MG: 25 TABLET ORAL at 20:05

## 2022-06-21 RX ADMIN — ALTEPLASE 4 MG: 2.2 INJECTION, POWDER, LYOPHILIZED, FOR SOLUTION INTRAVENOUS at 12:41

## 2022-06-21 RX ADMIN — OXYCODONE AND ACETAMINOPHEN 1 TABLET: 7.5; 325 TABLET ORAL at 10:07

## 2022-06-21 RX ADMIN — TRAZODONE HYDROCHLORIDE 50 MG: 50 TABLET ORAL at 20:05

## 2022-06-21 RX ADMIN — CLOPIDOGREL BISULFATE 75 MG: 75 TABLET ORAL at 08:07

## 2022-06-21 RX ADMIN — CALCIUM ACETATE 667 MG: 667 CAPSULE ORAL at 08:07

## 2022-06-21 RX ADMIN — METOPROLOL SUCCINATE 50 MG: 50 TABLET, EXTENDED RELEASE ORAL at 08:07

## 2022-06-21 RX ADMIN — OXYCODONE AND ACETAMINOPHEN 1 TABLET: 7.5; 325 TABLET ORAL at 17:20

## 2022-06-21 RX ADMIN — OXYCODONE AND ACETAMINOPHEN 1 TABLET: 7.5; 325 TABLET ORAL at 03:43

## 2022-06-21 RX ADMIN — Medication 10 ML: at 08:07

## 2022-06-21 RX ADMIN — LORAZEPAM 0.5 MG: 0.5 TABLET ORAL at 08:07

## 2022-06-21 ASSESSMENT — PAIN SCALES - GENERAL
PAINLEVEL_OUTOF10: 10
PAINLEVEL_OUTOF10: 8
PAINLEVEL_OUTOF10: 10

## 2022-06-21 ASSESSMENT — PAIN DESCRIPTION - LOCATION
LOCATION: BACK

## 2022-06-21 ASSESSMENT — PAIN DESCRIPTION - ORIENTATION: ORIENTATION: LOWER

## 2022-06-21 ASSESSMENT — PAIN SCALES - WONG BAKER
WONGBAKER_NUMERICALRESPONSE: 0
WONGBAKER_NUMERICALRESPONSE: 0

## 2022-06-21 ASSESSMENT — PAIN - FUNCTIONAL ASSESSMENT: PAIN_FUNCTIONAL_ASSESSMENT: 0-10

## 2022-06-21 ASSESSMENT — PAIN DESCRIPTION - PAIN TYPE: TYPE: CHRONIC PAIN

## 2022-06-21 NOTE — ACP (ADVANCE CARE PLANNING)
Advance Care Planning     General Advance Care Planning (ACP) Conversation    Date of Conversation: 6/21/2022  Conducted with: Patient with Decision Making Capacity    Healthcare Decision Maker:    Primary Decision Maker: Crystal Ann - Spouse - [phone]    Secondary Decision Maker: Brett Gallardo - Brother/Sister - [phone]  Click here to complete Healthcare Decision Makers including selection of the Healthcare Decision Maker Relationship (ie \"Primary\"). Today we documented Decision Maker(s) consistent with Legal Next of Kin hierarchy.       Length of Voluntary ACP Conversation in minutes:  <16 minutes (Non-Billable)    NINO Méndez

## 2022-06-21 NOTE — ED PROVIDER NOTES
CHIEF COMPLAINT  Shortness of Breath (MISSED DIAYSIS APPT (MWF). EMS STATES 80SAT HOME. CPAP PLACED ON PT. )      HISTORY OF PRESENT ILLNESS  Rylye Turner is a 45 y.o. male with a history of end-stage renal disease on hemodialysis following with Dr. Kailash Barraza from nephrology who presents to the ED complaining of shortness of breath. Patient reports that he has dialysis on Monday, Wednesday, and Friday. He states that he overslept today and his dialysis appointment was canceled. Patient reports increasing shortness of breath at home. Upon EMS arrival, O2 sats were approximately 80. CPAP was initiated. Patient denies chest pain or diaphoresis. No additional complaints. .   No other complaints, modifying factors or associated symptoms. I have reviewed the following from the nursing documentation.     Past Medical History:   Diagnosis Date    Ambulatory dysfunction     walker for long distances, SOB with distance    Aortic stenosis     echo 2017    Arthritis     hands and hips    Asthma     Bilateral hilar adenopathy syndrome 6/3/2013    CAD (coronary artery disease)     Dr. Darlin Kennedy [facility]) 04/19/2019    EF= 43%    CHF (congestive heart failure) (HCC)     Chronic pain     COPD (chronic obstructive pulmonary disease) ([address]     pulmonology Dr. Rm Anis    Depression     Diabetes mellitus ([address]     borderline    Difficult intravenous access     Emphysema of lung ([address]     ESRD (end stage renal disease) on dialysis ([address]     MWF    Fear of needles     Gastric ulcer     GERD (gastroesophageal reflux disease)     Heart valve problem     bicuspic valve    Hemodialysis patient ([address]     History of spinal fracture     work incident    Hx of blood clots     Bilateral lower extremities; stents in place    Hyperlipidemia     Hypertension     MI (myocardial infarction) ([address] 2019    has had 9 MIs. 2019 was the last    Neuromuscular disorder ([address]     due to CVA    Numbness and tingling in left arm     from fistula    Pneumonia     PONV (postoperative nausea and vomiting)     Prolonged emergence from general anesthesia     States requires more medication than most people    Sleep apnea     Uses CPAP    Stroke ([address]     7mm thalamic cva 2017 deficts left side, left side weakness    TIA (transient ischemic attack)     Unspecified diseases of blood and blood-forming organs      Past Surgical History:   Procedure Laterality Date    AORTIC VALVE REPLACEMENT N/A 10/15/2019    TRANSCATHETER AORTIC VALVE REPLACEMENT FEMORAL APPROACH performed by Rogelio Evans MD at [address] Right 7/2/2019    PERITONEAL DIALYSIS CATHETER REMOVAL performed by Kaycee Beavers [facility] COLONOSCOPY  2/29/2015    WNL    CORONARY ANGIOPLASTY WITH STENT PLACEMENT  05/26/15    CYST REMOVAL  08/14/2013    EXCISION CYSTS, NECK X2 AND ABDOMINAL benign    DIAGNOSTIC CARDIAC CATH LAB PROCEDURE      DIALYSIS FISTULA CREATION Left 10/30/2017    LEFT BRACHIAL CEPHALIC FISTULA    DIALYSIS FISTULA CREATION Left 3/27/2019    LIGATION  AV FISTULA performed by Bisi Townsend MD at [address], COLON, DIAGNOSTIC      IR TUNNELED [address] 5 YEARS  3/21/2022    IR TUNNELED CATHETER PLACEMENT GREATER THAN 5 YEARS 3/21/2022 [facility] SPECIAL PROCEDURES    IR TUNNELED CATHETER PLACEMENT GREATER THAN 5 YEARS  4/21/2022    IR TUNNELED CATHETER PLACEMENT GREATER THAN 5 YEARS 4/21/2022 [facility] SPECIAL PROCEDURES    IR TUNNELED CATHETER PLACEMENT GREATER THAN 5 YEARS  4/26/2022    IR TUNNELED CATHETER PLACEMENT GREATER THAN 5 YEARS 4/26/2022 [id number] [facility] SPECIAL PROCEDURES    OTHER SURGICAL HISTORY  02/01/2017    laparoscopic cholecystectomy with intraoperative cholangiogram    OTHER SURGICAL HISTORY  2018    PORT PLACEMENT  - vas cath    OTHER SURGICAL HISTORY Bilateral 06/26/2018    laprascopic peritoneal dialysis catheter placement    OTHER SURGICAL HISTORY Right 2018    peritoneal dialysis port placed on right side of abdomen    OTHER SURGICAL HISTORY  2019    PTA/Stenting R External Iliac artery    MA LAP INSERTION TUNNELED INTRAPERITONEAL CATHETER N/A 2018    LAPAROSCOPIC PERITONEAL DIALYSIS CATHETER REPLACEMENT performed by Divina March MD at [address] [facility] N/A 2022    PERINEAL ABCESS DRAINAGE performed by Divina March MD at [facility] ENDOSCOPY  2016    UPPER GASTROINTESTINAL ENDOSCOPY  2017    possible candida, otherwise normal appearing    VASCULAR SURGERY  aprx 2 years ago    2 stents placed, each side of groin     Family History   Problem Relation Age of Onset    Diabetes Mother     Heart Disease Father     Kidney Disease Sister         stage 4-kidney failure    Cancer Sister     Heart Disease Sister     Obesity Sister     Cancer Sister     Heart Disease Sister     Obesity Sister     Alcohol Abuse Brother      Social History     Socioeconomic History    Marital status:      Spouse name: Not on file    Number of children: Not on file    Years of education: Not on file    Highest education level: Not on file   Occupational History    Not on file   Tobacco Use    Smoking status: Former Smoker     Packs/day: 0.50     Years: 33.00     Pack years: 16.50     Types: Cigarettes     Quit date: 2020     Years since quittin.1    Smokeless tobacco: Never Used    Tobacco comment: States quit 2021   Vaping Use    Vaping Use: Never used   Substance and Sexual Activity    Alcohol use: Not Currently     Alcohol/week: 0.0 standard drinks     Comment: occ    Drug use: No    Sexual activity: Yes     Partners: Female     Comment:    Other Topics Concern    Not on file   Social History Narrative    Not on file     Social Determinants of Health     Financial Resource Strain:  Difficulty of Paying Living Expenses: Not on file   Food Insecurity:     Worried About Running Out of Food in the Last Year: Not on file    Ran Out of Food in the Last Year: Not on file   Transportation Needs:     Lack of Transportation (Medical): Not on file    Lack of Transportation (Non-Medical):  Not on file   Physical Activity:     Days of Exercise per Week: Not on file    Minutes of Exercise per Session: Not on file   Stress:     Feeling of Stress : Not on file   Social Connections:     Frequency of Communication with Friends and Family: Not on file    Frequency of Social Gatherings with Friends and Family: Not on file    Attends [employer] Services: Not on file    Active Member of [address] [place] [address] [employer] or Organizations: Not on file    Attends Club or Organization Meetings: Not on file    Marital Status: Not on file   Intimate Partner Violence:     Fear of Current or Ex-Partner: Not on file    Emotionally Abused: Not on file    Physically Abused: Not on file    Sexually Abused: Not on file   Housing Stability:     Unable to Pay for Housing in the Last Year: Not on file    Number of [place] in the Last Year: Not on file    Unstable Housing in the Last Year: Not on file     Current Facility-Administered Medications   Medication Dose Route Frequency Provider Last Rate Last Admin    apixaban (ELIQUIS) tablet 5 mg  5 mg Oral BID Fredy Shelton MD        calcium acetate (PHOSLO) capsule 667 mg  1 capsule Oral TID WC Fredy Shelton MD        clopidogrel (PLAVIX) tablet 75 mg  75 mg Oral Daily Fredy Shelton MD        docusate sodium (COLACE) capsule 100 mg  100 mg Oral Daily Fredy Shelton MD        DULoxetine (CYMBALTA) extended release capsule 30 mg  30 mg Oral Daily Fredy Shelton MD        isosorbide dinitrate (ISORDIL) tablet 20 mg  20 mg Oral TID Fredy Shelton MD        linaclotide Wannetta Loots) capsule 145 mcg  145 mcg Oral QAM AC Fredy Shelton MD        metoprolol succinate (TOPROL XL) extended release tablet 50 mg  50 mg Oral BID Charlene Zurita MD        oxyCODONE-acetaminophen (PERCOCET) 7.5-325 MG per tablet 1 tablet  1 tablet Oral Q6H PRN Charlene Zurita MD        pantoprazole (PROTONIX) tablet 40 mg  40 mg Oral QAM AC Charlene Zurita MD        pravastatin (PRAVACHOL) tablet 40 mg  40 mg Oral Daily Charlene Zurita MD        QUEtiapine (SEROQUEL) tablet 50 mg  50 mg Oral Nightly Charlene Zurita MD        tiotropium-olodaterol (STIOLTO) 2.5-2.5 MCG/ACT inhaler 2 puff  2 puff Inhalation Daily Charlene Zurita MD        sodium chloride flush 0.9 % injection 10 mL  10 mL IntraVENous PRN Charlene Zurita MD        0.9 % sodium chloride infusion   IntraVENous PRN Charlene Zurita MD        ondansetron (ZOFRAN-ODT) disintegrating tablet 4 mg  4 mg Oral Q8H PRN Charlene Zurita MD        Or    ondansetron [facility]) injection 4 mg  4 mg IntraVENous Q6H PRN Charlene Zurita MD        acetaminophen (TYLENOL) tablet 650 mg  650 mg Oral Q6H PRN Charlene Zurita MD        Or    acetaminophen (TYLENOL) suppository 650 mg  650 mg Rectal Q6H PRN Charlene Zurita MD        calcium carbonate (TUMS) chewable tablet 1,000 mg  1,000 mg Oral TID PRN Charlene Zurita MD        melatonin tablet 3 mg  3 mg Oral Nightly PRN Charlene Zurita MD         Allergies   Allergen Reactions    Morphine Nausea And Vomiting       REVIEW OF SYSTEMS  10 systems reviewed, pertinent positives per HPI otherwise noted to be negative. PHYSICAL EXAM  /71   Pulse (!) 102   Temp 97.8 °F (36.6 °C) (Oral)   Resp 21   Wt 245 lb 13 oz (111.5 kg)   SpO2 100%   BMI 39.68 kg/m²   GENERAL APPEARANCE: Awake and alert. Cooperative. Respiratory distress noted. CPAP/BiPAP in place upon arrival.  HEAD: Normocephalic. Atraumatic. EYES: PERRL. EOM's grossly intact. .  ENT: Mucous membranes are moist.   NECK: Supple, trachea midline. HEART: Tachycardic. [place] Normal S1, S2.  No murmurs, rubs or gallops. LUNGS: Increased respiratory effort. Decreased lung sounds throughout. No wheezes, rales, rhonchi. Chest wall: Dialysis catheter noted in left upper chest wall. ABDOMEN: Soft. Non-distended. Non-tender. No guarding or rebound. Normal Bowel sounds. EXTREMITIES: No peripheral edema. MAEE. No acute deformities. SKIN: Warm and dry. No acute rashes. NEUROLOGICAL: Alert and oriented X 3. CN II-XII intact. No gross facial drooping. Strength 5/5, sensation intact. No pronator drift. Normal coordination. Gait not tested. Julia Ordonez [facility]: Normal mood and affect. LABS  I have reviewed all labs for this visit.    Results for orders placed or performed during the hospital encounter of 06/21/22   CBC with Auto Differential   Result Value Ref Range    WBC 16.6 (H) 4.0 - 11.0 K/uL    RBC 3.76 (L) 4.20 - 5.90 M/uL    Hemoglobin 10.9 (L) 13.5 - 17.5 g/dL    Hematocrit 34.5 (L) 40.5 - 52.5 %    MCV 91.8 80.0 - 100.0 fL    MCH 29.0 26.0 - 34.0 pg    MCHC 31.6 31.0 - 36.0 g/dL    RDW 16.4 (H) 12.4 - 15.4 %    Platelets 497 304 - 175 K/uL    MPV 7.8 5.0 - 10.5 fL    Neutrophils % 91.0 %    Lymphocytes % 2.6 %    Monocytes % 3.9 %    Eosinophils % 1.7 %    Basophils % 0.8 %    Neutrophils Absolute 15.1 (H) 1.7 - 7.7 K/uL    Lymphocytes Absolute 0.4 (L) 1.0 - 5.1 K/uL    Monocytes Absolute 0.7 0.0 - 1.3 K/uL    Eosinophils Absolute 0.3 0.0 - 0.6 K/uL    Basophils Absolute 0.1 0.0 - 0.2 K/uL   Comprehensive Metabolic Panel   Result Value Ref Range    Sodium 129 (L) 136 - 145 mmol/L    Potassium 5.6 (H) 3.5 - 5.1 mmol/L    Chloride 89 (L) 99 - 110 mmol/L    CO2 21 21 - 32 mmol/L    Anion Gap 19 (H) 3 - 16    Glucose 244 (H) 70 - 99 mg/dL    BUN 57 (H) 7 - 20 mg/dL    CREATININE 7.9 (HH) 0.9 - 1.3 mg/dL    GFR Non-African American 7 (A) >60    GFR  9 (A) >60    Calcium 9.3 8.3 - 10.6 mg/dL    Total Protein 7.4 6.4 - 8.2 g/dL    Albumin 4.5 3.4 - 5.0 g/dL    Albumin/Globulin Ratio 1.6 1.1 - 2.2 Total Bilirubin 0.3 0.0 - 1.0 mg/dL    Alkaline Phosphatase 121 40 - 129 U/L    ALT 13 10 - 40 U/L    AST 30 15 - 37 U/L   Troponin   Result Value Ref Range    Troponin 0.10 (H) <0.01 ng/mL   Brain Natriuretic Peptide   Result Value Ref Range    Pro-BNP >70,000 (H) 0 - 124 pg/mL   Blood gas, venous   Result Value Ref Range    pH, Blade 7.272 (L) 7.350 - 7.450    pCO2, Blade 52.1 (H) 40.0 - 50.0 mmHg    pO2, Blade 37.3 25.0 - 40.0 mmHg    HCO3, Venous 23.5 23.0 - 29.0 mmol/L    Base Excess, Blade -3.8 (L) -3.0 - 3.0 mmol/L    O2 Sat, Blade 62 Not Established %    Carboxyhemoglobin 2.1 (H) 0.0 - 1.5 %    MetHgb, Blade 0.5 <1.5 %    TC02 (Calc), Blade 25 Not Established mmol/L    O2 Therapy Unknown    EKG 12 Lead   Result Value Ref Range    Ventricular Rate 124 BPM    Atrial Rate 124 BPM    P-R Interval 146 ms    QRS Duration 98 ms    Q-T Interval 308 ms    QTc Calculation (Bazett) 442 ms    P Axis 69 degrees    R Axis 34 degrees    T Axis 78 degrees    Diagnosis       Sinus tachycardiaLow voltage QRSNonspecific T wave abnormalityAbnormal ECGWhen compared with ECG of 07-JUN-2022 15:01,IA interval has decreasedVent. rate has increased BY  57 BPMIncomplete left bundle branch block is no longer PresentT wave inversion less evident in Lateral leads       EKG  The Ekg interpreted by myself  Sinus tachycardia with a rate of 124. Normal axis. Normal intervals and durations. QTc within normal limits. Nonspecific T wave changes noted. Nonspecific T wave changes appear to be more significant than previous EKG on 6/7/2022. Cardiac Monitoring: Mild tachycardia. RADIOLOGY  X-RAYS:  I have reviewed radiologic plain film image(s). ALL OTHER NON-PLAIN FILM IMAGES SUCH AS CT, ULTRASOUND AND MRI HAVE BEEN READ BY THE RADIOLOGIST. XR CHEST PORTABLE   Final Result   Findings compatible with interstitial edema and small left effusion. Rechecks: Physical assessment performed.   210: 2.  O2 sats 100% on BiPAP. 235: Pulse 111.    05 100: Pulse 99. Critical Care: Critical care 35 minutes was completed on patient in addition to and excluding any procedures noted secondary to concerns for acute respiratory failure. Is this patient to be included in the SEP-1 Core Measure? No   Exclusion criteria - the patient is NOT to be included for SEP-1 Core Measure due to: Infection is not suspected        ED COURSE/MDM  Patient seen and evaluated. Old records reviewed. Labs and imaging reviewed and results discussed with patient. Patient was given supplemental oxygen via BiPAP, and Lasix in the ED with good symptomatic relief. Patient was reassessed as noted above. Patient's labs. Reveal leukocytosis with stable hemoglobin. BUN/creatinine at 57/7.9 with a potassium of 5.6. Troponin and BNP are elevated. Imaging is consistent with congestive heart failure. Plan of care discussed with patient and family. Patient and family in agreement with plan. Patient was given scripts for the following medications. I counseled patient how to take these medications. Current Discharge Medication List          CLINICAL IMPRESSION  1. Acute respiratory failure with hypoxia ([address]    2. Acute on chronic congestive heart failure, unspecified heart failure type ([address]    3. Chronic kidney disease, unspecified CKD stage        Blood pressure 100/71, pulse (!) 102, temperature 97.8 °F (36.6 °C), temperature source Oral, resp. rate 21, weight 245 lb 13 oz (111.5 kg), SpO2 100 %. DISPOSITION  Leander Garcia was admitted in stable condition.         Shahana Jacobsen DO  06/21/22 [id number]

## 2022-06-21 NOTE — PROGRESS NOTES
06/21/22 0200   NIV Type   $NIV $Daily Charge   Skin Assessment Clean, dry, & intact   Skin Protection for O2 Device Yes   Location Nose   NIV Started/Stopped On   Equipment Type v60   Mode Bilevel   Mask Type Full face mask   Mask Size Medium   Settings/Measurements   IPAP 14 cmH20   CPAP/EPAP 8 cmH2O   Rate Ordered 4   Resp (!) 32   FiO2  100 %   Vt Exhaled 648 mL   Minute Volume 21 Liters   Mask Leak (lpm) 47 lpm   Comfort Level Good   Using Accessory Muscles Yes   SpO2 100   Alarm Settings   Alarms On Y   Low Pressure 6 cmH2O   High Pressure  30 cmH2O   Apnea (secs) 20 secs   RR Low  6   RR High 40 br/min

## 2022-06-21 NOTE — PROGRESS NOTES
06/21/22 0515   NIV Type   Skin Protection for O2 Device Yes   Location Nose   NIV Started/Stopped On   Equipment Type v60   Mode Bilevel   Mask Type Full face mask   Mask Size Medium   Settings/Measurements   IPAP 14 cmH20   CPAP/EPAP 8 cmH2O   Rate Ordered 8   Resp 27   FiO2  30 %   I Time/ I Time % 1 s   Vt Exhaled 686 mL   Minute Volume 18.5 Liters   Mask Leak (lpm) 11 lpm   Comfort Level Good   Using Accessory Muscles Yes   SpO2 99  (pt on pulse ox at  bedside)   Patient's Home Machine No   Alarm Settings   Alarms On Y   Low Pressure 6 cmH2O   High Pressure  30 cmH2O   Apnea (secs) 20 secs   RR Low  6   RR High 40 br/min

## 2022-06-21 NOTE — CONSULTS
emergence from general anesthesia     States requires more medication than most people    Sleep apnea     Uses CPAP    Stroke ([facility] [address]     7mm thalamic cva 2017 deficts left side, left side weakness    TIA (transient ischemic attack)     Unspecified diseases of blood and blood-forming organs        Past Surgical History:        Procedure Laterality Date    AORTIC VALVE REPLACEMENT N/A 10/15/2019    TRANSCATHETER AORTIC VALVE REPLACEMENT FEMORAL APPROACH performed by Saqib Barros MD at [address] [place] [address] Ave Right 7/2/2019    PERITONEAL DIALYSIS CATHETER REMOVAL performed by Sona Salas MD at [facility] COLONOSCOPY  2/29/2015    WN    CORONARY ANGIOPLASTY WITH STENT PLACEMENT  05/26/15    CYST REMOVAL  08/14/2013    EXCISION CYSTS, NECK X2 AND ABDOMINAL benign    DIAGNOSTIC CARDIAC CATH LAB PROCEDURE      DIALYSIS FISTULA CREATION Left 10/30/2017    LEFT BRACHIAL CEPHALIC FISTULA    DIALYSIS FISTULA CREATION Left 3/27/2019    LIGATION  AV FISTULA performed by Bertha Bonds MD at [facility]. [address], COLON, DIAGNOSTIC      IR TUNNELED [address] 5 YEARS  3/21/2022    IR TUNNELED CATHETER PLACEMENT GREATER THAN 5 YEARS 3/21/2022 [place] [facility]ED[facility]    IR TUNNELED CATHETER PLACEMENT GREATER THAN 5 YEARS  4/21/2022    IR TUNNELED CATHETER PLACEMENT GREATER THAN 5 YEARS 4/21/2022 [facility] PROCEDURES    IR TUNNELED CATHETER PLACEMENT GREATER THAN 5 YEARS  4/26/2022    IR TUNNELED CATHETER PLACEMENT GREATER THAN 5 YEARS 4/26/2022 [facility] SPECIAL PROCEDURES    OTHER SURGICAL HISTORY  02/01/2017    laparoscopic cholecystectomy with intraoperative cholangiogram    OTHER SURGICAL HISTORY  2018    PORT PLACEMENT  - vas cath    OTHER SURGICAL HISTORY Bilateral 06/26/2018    laprascopic peritoneal dialysis catheter placement    OTHER SURGICAL HISTORY Right 09/2018    peritoneal dialysis port placed on right side of abdomen    OTHER SURGICAL HISTORY  05/28/2019    PTA/Stenting R External Iliac artery    IN LAP INSERTION TUNNELED INTRAPERITONEAL CATHETER N/A 9/21/2018    LAPAROSCOPIC PERITONEAL DIALYSIS CATHETER REPLACEMENT performed by Glenna Sadler MD at [address] N/A 2/24/2022    PERINEAL ABCESS DRAINAGE performed by Glenna Sadler MD at [address]  [facility] ENDOSCOPY  01/06/2016    UPPER GASTROINTESTINAL ENDOSCOPY  01/29/2017    possible candida, otherwise normal appearing    VASCULAR SURGERY  aprx 2 years ago    2 stents placed, each side of groin       Home Medications:    No current facility-administered medications on file prior to encounter. Current Outpatient Medications on File Prior to Encounter   Medication Sig Dispense Refill    isosorbide dinitrate (ISORDIL) 20 MG tablet Take 1 tablet by mouth 3 times daily 90 tablet 3    oxyCODONE-acetaminophen (PERCOCET) 7.5-325 MG per tablet Take 1 tablet by mouth every 6 hours as needed (pain) for up to 30 days.  120 tablet 0    sacubitril-valsartan (ENTRESTO) 24-26 MG per tablet Take 1 tablet by mouth 2 times daily 60 tablet 0    clopidogrel (PLAVIX) 75 MG tablet Take 1 tablet by mouth daily 90 tablet 3    cyclobenzaprine (FLEXERIL) 10 MG tablet TAKE 1 TABLET BY MOUTH EVERY 8 HOURS AS NEEDED 90 tablet 10    pantoprazole (PROTONIX) 40 MG tablet TAKE 1 TABLET BY MOUTH EVERY MORNING BEFORE BREAKFAST 30 tablet 10    QUEtiapine (SEROQUEL) 50 MG tablet TAKE 1 TABLET BY MOUTH IN THE EVENING 30 tablet 10    QUEtiapine (SEROQUEL) 25 MG tablet Take 1 tablet by mouth nightly 60 tablet 3    docusate sodium (DOK) 100 MG capsule Take 1 capsule by mouth daily 30 capsule 5    LINZESS 145 MCG capsule TAKE 1 CAPSULE BY MOUTH IN THE MORNING BEFORE BREAKFAST 30 capsule 10    fluticasone (FLONASE) 50 MCG/ACT nasal spray SHAKE LIQUID AND USE 2 SPRAYS IN EACH NOSTRIL DAILY 48 g 0    DULoxetine (CYMBALTA) 30 MG extended release capsule TAKE 1 CAPSULE BY MOUTH EVERY DAY 30 capsule 10    mineral oil liquid Take 30 mLs by mouth daily as needed for Constipation 300 mL 0    sennosides-docusate sodium (SENOKOT-S) 8.6-50 MG tablet Take 1 tablet by mouth daily 10 tablet 0    metoprolol succinate (TOPROL XL) 50 MG extended release tablet Take 1 tablet by mouth in the morning and at bedtime 60 tablet 1    apixaban (ELIQUIS) 5 MG TABS tablet Take 1 tablet by mouth 2 times daily 60 tablet 1    pravastatin (PRAVACHOL) 40 MG tablet Take 1 tablet by mouth daily 90 tablet 3    Continuous Blood Gluc Sensor (DEXCOM G6 SENSOR) MISC Every 10 days 9 each 3    Continuous Blood Gluc Transmit (DEXCOM G6 TRANSMITTER) MISC 1 each by Does not apply route every 3 months 1 each 3    Continuous Blood Gluc  (DEXCOM G6 ) ADAM 1 each by Does not apply route Daily with lunch 1 each 0    B Complex-C-Folic Acid (VIRT-CAPS) 1 MG CAPS TAKE 1 CAPSULE BY MOUTH EVERY DAY 90 capsule 1    Calcium Acetate, Phos Binder, 667 MG CAPS TAKE 1 CAPSULE BY MOUTH THREE TIMES DAILY WITH MEALS 90 capsule 3    nitroGLYCERIN (NITROSTAT) 0.4 MG SL tablet DISSOLVE 1 TABLET UNDER THE TONGUE AS NEEDED FOR CHEST PAIN EVERY 5 MINUTES UP TO 3 TIMES. IF NO RELIEF CALL 911. 25 tablet 10    vitamin D (ERGOCALCIFEROL) 56883 units CAPS capsule TK 1 C PO WEEKLY  11    Tiotropium Bromide-Olodaterol (STIOLTO RESPIMAT) 2.5-2.5 MCG/ACT AERS Inhale 2 puffs into the lungs daily 2 Inhaler 0    Blood Glucose Monitoring Suppl ADAM USE AS DIRECTED.  1 Device 0    Alcohol Swabs PADS USE AS DIRECTED 300 each 3    albuterol sulfate  (90 Base) MCG/ACT inhaler Inhale 2 puffs into the lungs every 6 hours as needed for Wheezing 1 Inhaler 3    ipratropium-albuterol (DUONEB) 0.5-2.5 (3) MG/3ML SOLN nebulizer solution Inhale 3 mLs into the lungs every 6 hours as needed for Shortness of Breath 360 mL 1    calcium carbonate (TUMS) 500 MG chewable tablet Take 1 tablet by mouth 3 times daily as needed for Heartburn. Allergies:  Morphine    Social History:    Social History     Socioeconomic History    Marital status:      Spouse name: Not on file    Number of children: Not on file    Years of education: Not on file    Highest education level: Not on file   Occupational History    Not on file   Tobacco Use    Smoking status: Former Smoker     Packs/day: 0.50     Years: 33.00     Pack years: 16.50     Types: Cigarettes     Quit date: 2020     Years since quittin.1    Smokeless tobacco: Never Used    Tobacco comment: [facility] quit 2021   Vaping Use    Vaping Use: Never used   Substance and Sexual Activity    Alcohol use: Not Currently     Alcohol/week: 0.0 standard drinks     Comment: occ    Drug use: No    Sexual activity: Yes     Partners: Female     Comment:    Other Topics Concern    Not on file   Social History Narrative    Not on file     Social Determinants of Health     Financial Resource Strain:     Difficulty of Paying Living Expenses: Not on file   Food Insecurity:     Worried About [address] in the Last Year: Not on file    [address] in the Last Year: Not on file   Transportation Needs:     Lack of Transportation (Medical): Not on file    Lack of Transportation (Non-Medical):  Not on file   Physical Activity:     Days of Exercise per Week: Not on file    Minutes of Exercise per Session: Not on file   Stress:     Feeling of Stress : Not on file   Social Connections:     Frequency of Communication with Friends and Family: Not on file    Frequency of Social Gatherings with Friends and Family: Not on file    Attends [employer] Services: Not on file    Active Member of Clubs or Organizations: Not on file    Attends Club or Organization Meetings: Not on file    Marital Status: Not on file   Intimate Partner Violence:     Fear of Current or Ex-Partner: Not on file    Emotionally Abused: Not on file    Physically Abused: Not on file    Sexually Abused: Not on file   Housing Stability:     Unable to Pay for Housing in the Last Year: Not on file    Number of Places Lived in the Last Year: Not on file    Unstable Housing in the Last Year: Not on file       Family History:   Family History   Problem Relation Age of Onset    Diabetes Mother     Heart Disease Father     Kidney Disease Sister         stage 4-kidney failure    Cancer Sister     Heart Disease Sister     Obesity Sister     Cancer Sister     Heart Disease Sister     Obesity Sister     Alcohol Abuse Brother        Review of Systems:   Pertinent positives stated above in HPI. Remainder of 10 point review of systems were reviewed and were negative.     Physical exam:   Constitutional:  VITALS:  BP (!) 156/63   Pulse 86   Temp 97.7 °F (36.5 °C)   Resp 20   Wt 236 lb 5.3 oz (107.2 kg)   SpO2 100%   BMI 38.15 kg/m²   Gen: ill-appearing, nad  Skin: no rash, turgor wnl  Heent:  eomi, mmm  Neck: no bruits or jvd noted, thyroid normal  Cardiovascular:  S1, S2 without m/r/g  Respiratory: CTA B without w/r/r; on bi-level  Abdomen:  +bs, soft, nt, nd, no hepatosplenomegaly  Ext: + lower extremity edema  Psychiatric: mood and affect limited; judgement and insight limited  Musculoskeletal:  Rom, muscular strength limited; digits, nails normal    Data/  CBC:   Lab Results   Component Value Date    WBC 16.6 06/21/2022    RBC 3.76 06/21/2022    HGB 10.9 06/21/2022    HCT 34.5 06/21/2022    MCV 91.8 06/21/2022    MCH 29.0 06/21/2022    MCHC 31.6 06/21/2022    RDW 16.4 06/21/2022     06/21/2022    MPV 7.8 06/21/2022     BMP:    Lab Results   Component Value Date     06/21/2022    K 4.9 06/21/2022    K 4.5 06/06/2022    CL 92 06/21/2022    CO2 18 06/21/2022    BUN 56 06/21/2022    LABALBU 4.5 06/21/2022    CREATININE 8.7 06/21/2022    CALCIUM 9.4 06/21/2022    GFRAA 8 06/21/2022    GFRAA >60 05/17/2013    LABGLOM 6 06/21/2022    GLUCOSE 201 06/21/2022 Assessment/    # End stage kidney disease - on HD Mon-Wed-Fri     # sCHF/Ischemic cardiomyopathy - EF worse at ~20-25%      # CAD: s/p PATRICIA RCA 1/14/2022; s/p PATRICIA LAD 2015     # Bicuspid AV/severe AS: s/p TAVR 10/2019     # Hypertension      # Anemia of chronic disease - DAVON with HD     # Hyponatremia - chronic, due to fluid overload, monitor with HD      Plan/    - Repeat HD tomorrow to put back on regular schedule  - DAVON 10K qHD  - Trend labs, bp's      Thank you for the consultation. Please do not hesitate to call with questions. ____________________________________  Brionna Jackson MD  [facility]  [email or web address]  Office: [phone]

## 2022-06-21 NOTE — PROGRESS NOTES
06/21/22 0227   NIV Type   Skin Protection for O2 Device Yes   Location Nose   Equipment Type V60   Mode Bilevel   Mask Type Full face mask   Mask Size Medium   Settings/Measurements   IPAP 14 cmH20   CPAP/EPAP 8 cmH2O   Rate Ordered 4  (INCREASED TO 8)   Resp 25   FiO2  80 %  (DECREASED TO 40%)   I Time/ I Time % 1 s   Vt Exhaled 712 mL   Minute Volume 18.6 Liters   Mask Leak (lpm) 0 lpm   Comfort Level Good   Using Accessory Muscles No   SpO2 100   Alarm Settings   Alarms On Y   Low Pressure 6 cmH2O   High Pressure  30 cmH2O   Apnea (secs) 20 secs   RR Low  6   RR High 40 br/min

## 2022-06-21 NOTE — ACP (ADVANCE CARE PLANNING)
Advance Care Planning     General Advance Care Planning (ACP) Conversation    Date of Conversation: 6/21/2022  Conducted with: Patient with Decision Making Capacity    Healthcare Decision Maker:    Primary Decision Maker: Crystal Ann - Spouse - [phone]    Secondary Decision Maker: Elio Wang - Brother/Sister - [phone]  Click here to complete Healthcare Decision Makers including selection of the Healthcare Decision Maker Relationship (ie \"Primary\"). Today we documented Decision Maker(s) consistent with Legal Next of Kin hierarchy.         Length of Voluntary ACP Conversation in minutes:  <16 minutes (Non-Billable)    NINO Aviles

## 2022-06-21 NOTE — PROGRESS NOTES
06/21/22 0353   NIV Type   Skin Protection for O2 Device Yes   Location Nose   Equipment Type v60   Mode Bilevel   Mask Type Full face mask   Mask Size Medium   Settings/Measurements   IPAP 14 cmH20   CPAP/EPAP 8 cmH2O   Rate Ordered 8   Resp 22   FiO2  40 %  (decreased to 30%)   I Time/ I Time % 1 s   Vt Exhaled 550 mL   Minute Volume 18.9 Liters   Mask Leak (lpm) 49 lpm   Comfort Level Good   Using Accessory Muscles No   SpO2 100   Patient's Home Machine No   Alarm Settings   Alarms On Y   Low Pressure 6 cmH2O   High Pressure  30 cmH2O   Apnea (secs) 20 secs   RR Low  6   RR High 40 br/min

## 2022-06-21 NOTE — PROGRESS NOTES
Incomplete home med list verification due to pt being on continuous bi-pap. Pt states he is having hard time breathing. Therefore admission is not done.

## 2022-06-21 NOTE — FLOWSHEET NOTE
06/21/22 1108 06/21/22 1332   Vital Signs   BP (!) 167/82 (!) 156/63   Temp 99 °F (37.2 °C) 97.7 °F (36.5 °C)   Heart Rate 88 86   Resp 20 20   Weight 240 lb 8.4 oz (109.1 kg) 236 lb 5.3 oz (107.2 kg)   Weight Method Bed scale Bed scale     Treatment time: 1:12 left of 3.5 hours  Net UF: 1947 ml     Access used: LTDC  Access function: poor with  ml/min     Medications or blood products given: Cathflo      Regular outpatient schedule: [facility] MWF     Summary of response to treatment: TDC positional BFR only 300, Cathflo instilled post tx to dwell until next HD 6/22/22 per MD, pt requested off with 1:12 left of 3.5 hr, MD notified and AMA signed. Copy of dialysis treatment record placed in chart, to be scanned into EMR.

## 2022-06-21 NOTE — PROGRESS NOTES
Pt states that he is struggling to breath on Bi-PAP. He states that he is getting tired of working to breath. Pt O2 sat is % on 30% FIO2 Bi-PAP. RT notified.

## 2022-06-21 NOTE — PROGRESS NOTES
4 Eyes Skin Assessment     The patient is being assess for   Admission    I agree that 2 RN's have performed a thorough Head to Toe Skin Assessment on the patient. ALL assessment sites listed below have been assessed. Areas assessed for pressure by both nurses:   [x]   Head, Face, and Ears   [x]   Shoulders, Back, and Chest, Abdomen  [x]   Arms, Elbows, and Hands   [x]   Coccyx, Sacrum, and Ischium  [x]   Legs, Feet, and Heels        Skin Assessed Under all Medical Devices by both nurses:  Bipap mask              All Mepilex Borders were peeled back and area peeked at by both nurses:  Yes  Please list where Mepilex Borders are located:  nose             **SHARE this note so that the co-signing nurse is able to place an eSignature**    Co-signer eSignature: Electronically signed by Mark Morris RN on 6/21/22 at 6:00 AM EDT    Does the Patient have Skin Breakdown related to pressure?   No              Isaac Prevention initiated:  No   Wound Care Orders initiated:  No      [facility] nurse consulted for Pressure Injury (Stage 3,4, Unstageable, DTI, NWPT, Complex wounds)and New or Established Ostomies:  No      Primary Nurse eSignature: Electronically signed by Marylin Song RN on 6/21/22 at 5:58 AM EDT

## 2022-06-21 NOTE — H&P
Hospital Medicine History & Physical      PCP: Cat Montgomery MD    Date of Admission: 6/21/2022    Date of Service: Pt seen/examined on 6/21/2022 and Admitted to Inpatient with expected LOS greater than two midnights due to medical therapy. Chief Complaint:    Shortness of breath. History Of Present Illness:    45 y.o. male who presented to [facility] with shortness of breath. Patient takes trazodone at home to sleep. He says he ran out and was not sleeping well. He slept through his dialysis time on Monday. He missed HD on Monday. Patient with increased shortness of breath. No fever or chills. Cough nonproductive. Patient on bipap.       Past Medical History:          Diagnosis Date    Ambulatory dysfunction     walker for long distances, SOB with distance    Aortic stenosis     echo 2017    Arthritis     hands and hips    Asthma     Bilateral hilar adenopathy syndrome 6/3/2013    CAD (coronary artery disease)     Dr. Glasgow [facility]) 04/19/2019    EF= 43%    CHF (congestive heart failure) (HCC)     Chronic pain     COPD (chronic obstructive pulmonary disease) ([address]     pulmonology Dr. Leander Palacios    Depression     Diabetes mellitus ([address]     borderline    Difficult intravenous access     Emphysema of lung ([address]     ESRD (end stage renal disease) on dialysis ([address]     MWF    Fear of needles     Gastric ulcer     GERD (gastroesophageal reflux disease)     Heart valve problem     bicuspic valve    Hemodialysis patient ([address]     History of spinal fracture     work incident    Hx of blood clots     Bilateral lower extremities; stents in place    Hyperlipidemia     Hypertension     MI (myocardial infarction) ([address] 2019    has had 9 MIs. 2019 was the last    Neuromuscular disorder ([address]     due to CVA    Numbness and tingling in left arm     from fistula    Pneumonia     PONV (postoperative nausea and vomiting)     Prolonged emergence from general anesthesia     States requires more medication than most people    Sleep apnea     Uses CPAP    Stroke ([facility]     7mm thalamic cva 2017 deficts left side, left side weakness    TIA (transient ischemic attack)     Unspecified diseases of blood and blood-forming organs        Past Surgical History:          Procedure Laterality Date    AORTIC VALVE REPLACEMENT N/A 10/15/2019    TRANSCATHETER AORTIC VALVE REPLACEMENT FEMORAL APPROACH performed by Cally Esteban MD at [address] Right 7/2/2019    PERITONEAL DIALYSIS CATHETER REMOVAL performed by Yariel Rodriguez MD at [facility] COLONOSCOPY  2/29/2015    WN    CORONARY ANGIOPLASTY WITH STENT PLACEMENT  05/26/15    CYST REMOVAL  08/14/2013    EXCISION CYSTS, NECK X2 AND ABDOMINAL benign    DIAGNOSTIC CARDIAC CATH LAB PROCEDURE      DIALYSIS FISTULA CREATION Left 10/30/2017    LEFT BRACHIAL CEPHALIC FISTULA    DIALYSIS FISTULA CREATION Left 3/27/2019    LIGATION  AV FISTULA performed by Adali Moreno MD at [address], COLON, DIAGNOSTIC      IR TUNNELED [address] 5 YEARS  3/21/2022    IR TUNNELED CATHETER PLACEMENT GREATER THAN 5 YEARS 3/21/2022 [facility] SPECIAL PROCEDURES    IR TUNNELED CATHETER PLACEMENT GREATER THAN 5 YEARS  4/21/2022    IR TUNNELED CATHETER PLACEMENT GREATER THAN 5 YEARS 4/21/2022 [facility] SPECIAL PROCEDURES    IR TUNNELED CATHETER PLACEMENT GREATER THAN 5 YEARS  4/26/2022    IR TUNNELED CATHETER PLACEMENT GREATER THAN 5 YEARS 4/26/2022 [facility] SPECIAL PROCEDURES    OTHER SURGICAL HISTORY  02/01/2017    laparoscopic cholecystectomy with intraoperative cholangiogram    OTHER SURGICAL HISTORY  2018    PORT PLACEMENT  - vas cath    OTHER SURGICAL HISTORY Bilateral 06/26/2018    laprascopic peritoneal dialysis catheter placement    OTHER SURGICAL HISTORY Right 09/2018    peritoneal dialysis port placed on right side of abdomen    OTHER SURGICAL HISTORY  05/28/2019 PTA/Stenting R External Iliac artery    CA LAP INSERTION TUNNELED INTRAPERITONEAL CATHETER N/A 9/21/2018    LAPAROSCOPIC PERITONEAL DIALYSIS CATHETER REPLACEMENT performed by Divina March MD at [address] [facility] N/A 2/24/2022    PERINEAL ABCESS DRAINAGE performed by Divina March MD at [facility] ENDOSCOPY  01/06/2016    UPPER GASTROINTESTINAL ENDOSCOPY  01/29/2017    possible candida, otherwise normal appearing    VASCULAR SURGERY  aprx 2 years ago    2 stents placed, each side of groin       Medications Prior to Admission:      Prior to Admission medications    Medication Sig Start Date End Date Taking? Authorizing Provider   isosorbide dinitrate (ISORDIL) 20 MG tablet Take 1 tablet by mouth 3 times daily 6/8/22   JASE Mott   oxyCODONE-acetaminophen (PERCOCET) 7.5-325 MG per tablet Take 1 tablet by mouth every 6 hours as needed (pain) for up to 30 days.  6/3/22 7/3/22  JAY Guerrero - CNP   sacubitril-valsartan (ENTRESTO) 24-26 MG per tablet Take 1 tablet by mouth 2 times daily 6/1/22   Dana Amato MD   clopidogrel (PLAVIX) 75 MG tablet Take 1 tablet by mouth daily 5/9/22   JAY Hayes - CNP   cyclobenzaprine (FLEXERIL) 10 MG tablet TAKE 1 TABLET BY MOUTH EVERY 8 HOURS AS NEEDED 4/28/22   Ji Harrell MD   pantoprazole (PROTONIX) 40 MG tablet TAKE 1 TABLET BY MOUTH EVERY MORNING BEFORE BREAKFAST 4/28/22   Ji Harrell MD   QUEtiapine (SEROQUEL) 50 MG tablet TAKE 1 TABLET BY MOUTH IN THE EVENING 4/28/22   Ji Harrell MD   QUEtiapine (SEROQUEL) 25 MG tablet Take 1 tablet by mouth nightly 4/27/22   Haily Bobo MD   docusate sodium (DOK) 100 MG capsule Take 1 capsule by mouth daily 4/27/22   Haily Bobo MD   LINZESS 145 MCG capsule TAKE 1 CAPSULE BY MOUTH IN THE MORNING BEFORE BREAKFAST 4/27/22   Ji Harrell MD   fluticasone (FLONASE) 50 MCG/ACT nasal spray SHAKE LIQUID AND USE 2 SPRAYS IN [facility] NOSTRIL DAILY 4/17/22   Jaden Gross MD   DULoxetine (CYMBALTA) 30 MG extended release capsule TAKE 1 CAPSULE BY MOUTH EVERY DAY 3/29/22   Jaden Gross MD   mineral oil liquid Take 30 mLs by mouth daily as needed for Constipation 3/9/22   Jaden Gross MD   sennosides-docusate sodium (SENOKOT-S) 8.6-50 MG tablet Take 1 tablet by mouth daily 3/9/22   Jaden Gross MD   metoprolol succinate (TOPROL XL) 50 MG extended release tablet Take 1 tablet by mouth in the morning and at bedtime 3/3/22   Renan Sanders MD   apixaban (ELIQUIS) 5 MG TABS tablet Take 1 tablet by mouth 2 times daily 3/3/22   Renan Sanders MD   pravastatin (PRAVACHOL) 40 MG tablet Take 1 tablet by mouth daily 2/10/22   JAY Christopher - CNP   Continuous Blood Gluc Sensor (DEXCOM G6 SENSOR) MISC Every 10 days 10/5/21   Jaden Gross MD   Continuous Blood Gluc Transmit (DEXCOM G6 TRANSMITTER) MISC 1 each by Does not apply route every 3 months 10/5/21   Jaden Gross MD   Continuous Blood Gluc  ([email or web address] ) ADAM 1 each by Does not apply route Daily with lunch 10/5/21   Jaden Gross MD   B Complex-C-Folic Acid (VIRT-CAPS) 1 MG CAPS TAKE 1 CAPSULE BY MOUTH EVERY DAY 9/20/21   Jaden Gross MD   Calcium Acetate, Phos Binder, 667 MG CAPS TAKE 1 CAPSULE BY MOUTH THREE TIMES DAILY WITH MEALS 8/12/21   Jaden Gross MD   nitroGLYCERIN (NITROSTAT) 0.4 MG SL tablet DISSOLVE 1 TABLET UNDER THE TONGUE AS NEEDED FOR CHEST PAIN EVERY 5 MINUTES UP TO 3 TIMES.  IF NO RELIEF CALL 911. 1/7/21   Jaden Gross MD   vitamin D (ERGOCALCIFEROL) 02582 units CAPS capsule TK 1 C PO WEEKLY 6/2/19   Historical Provider, MD   Tiotropium Bromide-Olodaterol (STIOLTO RESPIMAT) 2.5-2.5 MCG/ACT AERS Inhale 2 puffs into the lungs daily 5/21/19   Luis Sepulveda MD   Blood Glucose Monitoring Suppl ADAM USE AS DIRECTED. 4/25/18   Kaden Ratliff MD   Alcohol Swabs PADS USE AS DIRECTED 4/25/18   Kaden Ratliff MD albuterol sulfate  (90 Base) MCG/ACT inhaler Inhale 2 puffs into the lungs every 6 hours as needed for Wheezing 11/8/17   Dulce Gonzalez MD   ipratropium-albuterol (DUONEB) 0.5-2.5 (3) MG/3ML SOLN nebulizer solution Inhale 3 mLs into the lungs every 6 hours as needed for Shortness of Breath 10/15/17   Cici Ferraro MD   calcium carbonate (TUMS) 500 MG chewable tablet Take 1 tablet by mouth 3 times daily as needed for Heartburn. Historical Provider, MD       Allergies:  Morphine    Social History:      The patient currently lives home    TOBACCO:   reports that he quit smoking about 2 years ago. His smoking use included cigarettes. He has a 16.50 pack-year smoking history. He has never used smokeless tobacco.  ETOH:   reports previous alcohol use. E-Cigarettes/Vaping Use     Questions Responses    E-Cigarette/Vaping Use Never User    Start Date     Passive Exposure     Quit Date     Counseling Given     Comments             Family History:      Reviewed in detail and negative for DM, CAD, Cancer, CVA. Positive as follows:        Problem Relation Age of Onset    Diabetes Mother     Heart Disease Father     Kidney Disease Sister         stage 4-kidney failure    Cancer Sister     Heart Disease Sister     Obesity Sister     Cancer Sister     Heart Disease Sister     Obesity Sister     Alcohol Abuse Brother        REVIEW OF SYSTEMS COMPLETED:   Pertinent positives as noted in the HPI. All other systems reviewed and negative. PHYSICAL EXAM PERFORMED:    BP (!) 158/56   Pulse (!) 101   Temp 97.8 °F (36.6 °C) (Oral)   Resp 24   Wt 245 lb 13 oz (111.5 kg)   SpO2 100%   BMI 39.68 kg/m²     General appearance:  No apparent distress, appears stated age and cooperative. HEENT:  Normal cephalic, atraumatic without obvious deformity. Pupils equal, round, and reactive to light. Extra ocular muscles intact. Conjunctivae/corneas clear. Neck: Supple, with full range of motion.  No jugular venous distention. Trachea midline. Respiratory:  Distant breath sounds. Prolonged exp phase. Cardiovascular:  Regular rate and rhythm with normal S1/S2 without murmurs, rubs or gallops. Abdomen: Soft, non-tender, non-distended with normal bowel sounds. Musculoskeletal:  No clubbing, cyanosis or edema bilaterally. Full range of motion without deformity. Skin: Skin color, texture, turgor normal.  No rashes or lesions. Neurologic:  Neurovascularly intact without any focal sensory/motor deficits. Cranial nerves: II-XII intact, grossly non-focal.  Psychiatric:  Alert and oriented, thought content appropriate, normal insight  Capillary Refill: Brisk,3 seconds, normal  Peripheral Pulses: +2 palpable, equal bilaterally       Labs:     Recent Labs     06/21/22 0216   WBC 16.6*   HGB 10.9*   HCT 34.5*        Recent Labs     06/21/22 0216 06/21/22  0539   * 131*   K 5.6* 4.9   CL 89* 92*   CO2 21 18*   BUN 57* 56*   CREATININE 7.9* 8.7*   CALCIUM 9.3 9.4   PHOS  --  5.7*     Recent Labs     06/21/22 0216   AST 30   ALT 13   BILITOT 0.3   ALKPHOS 121     No results for input(s): INR in the last 72 hours. Recent Labs     06/21/22 0216   TROPONINI 0.10*       Urinalysis:      Lab Results   Component Value Date    NITRU Negative 05/29/2022    WBCUA 10-20 05/29/2022    BACTERIA 3+ 05/29/2022    RBCUA 5-10 05/29/2022    BLOODU TRACE-INTACT 05/29/2022    SPECGRAV 1.015 05/29/2022    GLUCOSEU 100 05/29/2022       Radiology:     CXR: I have reviewed the CXR with the following interpretation: Pulm edema  EKG:  I have reviewed the EKG with the following interpretation: Nonspecific ST and T-wave changes. XR CHEST PORTABLE   Final Result   Findings compatible with interstitial edema and small left effusion.              Consults:    IP CONSULT TO HOSPITALIST  IP CONSULT TO NEPHROLOGY    ASSESSMENT:    Active Hospital Problems    Diagnosis Date Noted    Ischemic cardiomyopathy [I25.5]      Priority: High    Type 2 diabetes, uncontrolled, with neuropathy ([facility] [E11.40, E11.65] 03/04/2014     Priority: High    Morbid obesity with BMI of 40.0-44.9, adult ([facility] [E66.01, Z68.41] 04/26/2022     Priority: Medium    Asthma-COPD overlap syndrome ([facility] [J44.9] 05/14/2013     Priority: Medium    Respiratory failure ([facility] [J96.90] 04/18/2022    Respiratory failure with hypoxia ([facility] [J96.91] 11/29/2021    ESRD (end stage renal disease) ([facility] [N18.6] 11/22/2017         PLAN:    1. Pulm edema due to missed dialysis. Plan for HD per nephrology. Monitor UOP. Wean O2 and bipap as tolerated. 2. ESRD - Continue HD per nephrology. 3. Insomnia/Anxiety - Trazodone ordered and Ativan ordered prn. Continue Cymbalta. 4. DMII - Monitor BS and correct with bolus insulin per sliding scale. DVT Prophylaxis: Eliquis  Diet: ADULT DIET; Regular; 4 carb choices (60 gm/meal); Low Fat/Low Chol/High Fiber/NIDA; Low Potassium (Less than 3000 mg/day); Low Phosphorus (Less than 1000 mg)  Code Status: Full Code    PT/OT Eval Status: Pending    Dispo - Home when improved. 1-2 days. Silvio Favre, MD    Thank you Chuyita Swan MD for the opportunity to be involved in this patient's care. If you have any questions or concerns please feel free to contact me at [phone].

## 2022-06-21 NOTE — PLAN OF CARE
Problem: Discharge Planning  Goal: Discharge to home or other facility with appropriate resources  Outcome: Progressing  Flowsheets (Taken 6/21/2022 0806)  Discharge to home or other facility with appropriate resources: Identify barriers to discharge with patient and caregiver     Problem: Pain  Goal: Verbalizes/displays adequate comfort level or baseline comfort level  Outcome: Progressing     Problem: Chronic Conditions and Co-morbidities  Goal: Patient's chronic conditions and co-morbidity symptoms are monitored and maintained or improved  Outcome: Progressing  Flowsheets (Taken 6/21/2022 0806)  Care Plan - Patient's Chronic Conditions and Co-Morbidity Symptoms are Monitored and Maintained or Improved: Monitor and assess patient's chronic conditions and comorbid symptoms for stability, deterioration, or improvement

## 2022-06-21 NOTE — CARE COORDINATION
CASE MANAGEMENT INITIAL ASSESSMENT      Reviewed chart and completed assessment with patient:yes  Family present: no  Explained Case Management role/services. yes    Primary contact information:Crystal, Spouse    Health Care Decision Maker :   Primary Decision Maker: Crystal Ann - Spouse - [phone]    Secondary Decision Maker: Rut Coronado - Brother/Sister - [phone]          Can this person be reached and be able to respond quickly, such as within a few minutes or hours? Yes      Admit date/status:6/21/22  Diagnosis:Respiratory failure ([address]   Is this a Readmission?:  Yes      Insurance:Ocampo   Precert required for SNF: Yes       3 night stay required: No    Living arrangements, Adls, care needs, prior to admission:Lives at home with wife, independent in [address] [place] [address] at home:  Walker_X_Cane__RTS__ BSC__Shower Chair__  02_[employer]_ HHN__ CPAP__  BiPap__  Hospital Bed__ W/C___ Other_____    Services in the home and/or outpatient, prior to 2001 Timothy Solorio MD                                Medications:yes Prescription coverage? Yes Will pt require financial assistance with medications No     Transportation needs: none     Dialysis Facility (if applicable)   · Name: [facility]  · [address] [place] [address], [address] [place] [address]  · Dialysis Schedule:[facility]  · Phone: [phone]  · Fax: [phone]    PT/OT recs:    Hospital Exemption Notification (HEN):    Barriers to discharge:medical complications     Plan/comments:Referred to patient for discharge planning. Patient is a 45year old male presenting to [facility] with respiratory failure. Patient reports that he lives at home with wife, Anthony Hurtado and is independent in ADLs, however, has gotten slower with being able to complete ADLs. Patient states that his wife is disabled and not much assistance at home. Patient receives HD [facility] and receives O2 from [facility].  Patient is agreeable to home care and services will re-start upon discharge with [place] [phone].  Electronically signed by NINO Cabrera Student on 6/21/2022 at 10:01 AM    Electronically signed by CLAUDETTE Dodge on 6/21/2022 at 3:47 PM     [facility] on chart for MD signature

## 2022-06-21 NOTE — PROGRESS NOTES
Dr. Sherryle Brakeman notified that pt on Bi-PAP and stating he getting tired of breathing. pt anxious and taking deep breaths RR 28. SpO2 % RT notified but stated that pt may just need something to relax. Can this pt have IV Ativan to help at this time.

## 2022-06-22 LAB
ALBUMIN SERPL-MCNC: 3.8 G/DL (ref 3.4–5)
ANION GAP SERPL CALCULATED.3IONS-SCNC: 17 MMOL/L (ref 3–16)
BASOPHILS ABSOLUTE: 0.1 K/UL (ref 0–0.2)
BASOPHILS RELATIVE PERCENT: 1 %
BUN BLDV-MCNC: 40 MG/DL (ref 7–20)
CALCIUM SERPL-MCNC: 8.7 MG/DL (ref 8.3–10.6)
CHLORIDE BLD-SCNC: 94 MMOL/L (ref 99–110)
CO2: 18 MMOL/L (ref 21–32)
CREAT SERPL-MCNC: 6.2 MG/DL (ref 0.9–1.3)
EOSINOPHILS ABSOLUTE: 0.5 K/UL (ref 0–0.6)
EOSINOPHILS RELATIVE PERCENT: 6.7 %
ESTIMATED AVERAGE GLUCOSE: 131.2 MG/DL
GFR AFRICAN AMERICAN: 11
GFR NON-AFRICAN AMERICAN: 9
GLUCOSE BLD-MCNC: 136 MG/DL (ref 70–99)
HBA1C MFR BLD: 6.2 %
HCT VFR BLD CALC: 29.9 % (ref 40.5–52.5)
HEMOGLOBIN: 9.5 G/DL (ref 13.5–17.5)
INR BLD: 1.19 (ref 0.87–1.14)
LYMPHOCYTES ABSOLUTE: 1 K/UL (ref 1–5.1)
LYMPHOCYTES RELATIVE PERCENT: 15.1 %
MCH RBC QN AUTO: 29.3 PG (ref 26–34)
MCHC RBC AUTO-ENTMCNC: 31.9 G/DL (ref 31–36)
MCV RBC AUTO: 92 FL (ref 80–100)
MONOCYTES ABSOLUTE: 0.7 K/UL (ref 0–1.3)
MONOCYTES RELATIVE PERCENT: 9.9 %
NEUTROPHILS ABSOLUTE: 4.6 K/UL (ref 1.7–7.7)
NEUTROPHILS RELATIVE PERCENT: 67.3 %
PDW BLD-RTO: 16.8 % (ref 12.4–15.4)
PHOSPHORUS: 5.9 MG/DL (ref 2.5–4.9)
PLATELET # BLD: 304 K/UL (ref 135–450)
PMV BLD AUTO: 7.8 FL (ref 5–10.5)
POTASSIUM SERPL-SCNC: 4.7 MMOL/L (ref 3.5–5.1)
PROTHROMBIN TIME: 14.9 SEC (ref 11.7–14.5)
RBC # BLD: 3.24 M/UL (ref 4.2–5.9)
SODIUM BLD-SCNC: 129 MMOL/L (ref 136–145)
WBC # BLD: 6.8 K/UL (ref 4–11)

## 2022-06-22 PROCEDURE — 94761 N-INVAS EAR/PLS OXIMETRY MLT: CPT

## 2022-06-22 PROCEDURE — 2700000000 HC OXYGEN THERAPY PER DAY

## 2022-06-22 PROCEDURE — 90935 HEMODIALYSIS ONE EVALUATION: CPT

## 2022-06-22 PROCEDURE — 6370000000 HC RX 637 (ALT 250 FOR IP): Performed by: INTERNAL MEDICINE

## 2022-06-22 PROCEDURE — 6370000000 HC RX 637 (ALT 250 FOR IP)

## 2022-06-22 PROCEDURE — 6360000002 HC RX W HCPCS

## 2022-06-22 PROCEDURE — 2580000003 HC RX 258: Performed by: INTERNAL MEDICINE

## 2022-06-22 PROCEDURE — 2500000003 HC RX 250 WO HCPCS: Performed by: INTERNAL MEDICINE

## 2022-06-22 PROCEDURE — 2060000000 HC ICU INTERMEDIATE R&B

## 2022-06-22 PROCEDURE — 94640 AIRWAY INHALATION TREATMENT: CPT

## 2022-06-22 PROCEDURE — 85610 PROTHROMBIN TIME: CPT

## 2022-06-22 PROCEDURE — 80069 RENAL FUNCTION PANEL: CPT

## 2022-06-22 PROCEDURE — 85025 COMPLETE CBC W/AUTO DIFF WBC: CPT

## 2022-06-22 PROCEDURE — 94660 CPAP INITIATION&MGMT: CPT

## 2022-06-22 PROCEDURE — 36415 COLL VENOUS BLD VENIPUNCTURE: CPT

## 2022-06-22 RX ORDER — HEPARIN SODIUM 1000 [USP'U]/ML
INJECTION, SOLUTION INTRAVENOUS; SUBCUTANEOUS
Status: DISPENSED
Start: 2022-06-22 | End: 2022-06-22

## 2022-06-22 RX ORDER — TRAZODONE HYDROCHLORIDE 50 MG/1
100 TABLET ORAL NIGHTLY
Status: DISCONTINUED | OUTPATIENT
Start: 2022-06-22 | End: 2022-06-23

## 2022-06-22 RX ORDER — CLOPIDOGREL BISULFATE 75 MG/1
75 TABLET ORAL DAILY
Status: DISCONTINUED | OUTPATIENT
Start: 2022-06-24 | End: 2022-06-28 | Stop reason: HOSPADM

## 2022-06-22 RX ADMIN — OXYCODONE AND ACETAMINOPHEN 1 TABLET: 7.5; 325 TABLET ORAL at 17:23

## 2022-06-22 RX ADMIN — PANTOPRAZOLE SODIUM 40 MG: 40 TABLET, DELAYED RELEASE ORAL at 11:42

## 2022-06-22 RX ADMIN — ISOSORBIDE DINITRATE 20 MG: 20 TABLET ORAL at 20:07

## 2022-06-22 RX ADMIN — TIOTROPIUM BROMIDE AND OLODATEROL 2 PUFF: 3.124; 2.736 SPRAY, METERED RESPIRATORY (INHALATION) at 09:06

## 2022-06-22 RX ADMIN — APIXABAN 5 MG: 5 TABLET, FILM COATED ORAL at 11:49

## 2022-06-22 RX ADMIN — EPOETIN ALFA-EPBX 10000 UNITS: 10000 INJECTION, SOLUTION INTRAVENOUS; SUBCUTANEOUS at 10:04

## 2022-06-22 RX ADMIN — CLOPIDOGREL BISULFATE 75 MG: 75 TABLET ORAL at 11:49

## 2022-06-22 RX ADMIN — CALCIUM ACETATE 667 MG: 667 CAPSULE ORAL at 11:42

## 2022-06-22 RX ADMIN — METOPROLOL SUCCINATE 50 MG: 50 TABLET, EXTENDED RELEASE ORAL at 11:51

## 2022-06-22 RX ADMIN — TRAZODONE HYDROCHLORIDE 100 MG: 50 TABLET ORAL at 20:07

## 2022-06-22 RX ADMIN — METOPROLOL SUCCINATE 50 MG: 50 TABLET, EXTENDED RELEASE ORAL at 20:07

## 2022-06-22 RX ADMIN — Medication 10 ML: at 20:07

## 2022-06-22 RX ADMIN — OXYCODONE AND ACETAMINOPHEN 1 TABLET: 7.5; 325 TABLET ORAL at 11:48

## 2022-06-22 RX ADMIN — QUETIAPINE FUMARATE 50 MG: 25 TABLET ORAL at 20:07

## 2022-06-22 RX ADMIN — PRAVASTATIN SODIUM 40 MG: 40 TABLET ORAL at 11:49

## 2022-06-22 RX ADMIN — DULOXETINE HYDROCHLORIDE 30 MG: 30 CAPSULE, DELAYED RELEASE ORAL at 11:49

## 2022-06-22 RX ADMIN — LORAZEPAM 0.5 MG: 0.5 TABLET ORAL at 09:41

## 2022-06-22 RX ADMIN — CALCIUM ACETATE 667 MG: 667 CAPSULE ORAL at 17:17

## 2022-06-22 RX ADMIN — ISOSORBIDE DINITRATE 20 MG: 20 TABLET ORAL at 11:49

## 2022-06-22 RX ADMIN — DOCUSATE SODIUM 100 MG: 100 CAPSULE, LIQUID FILLED ORAL at 11:49

## 2022-06-22 ASSESSMENT — PAIN SCALES - GENERAL
PAINLEVEL_OUTOF10: 8
PAINLEVEL_OUTOF10: 8
PAINLEVEL_OUTOF10: 3
PAINLEVEL_OUTOF10: 0
PAINLEVEL_OUTOF10: 3
PAINLEVEL_OUTOF10: 8
PAINLEVEL_OUTOF10: 0

## 2022-06-22 ASSESSMENT — PAIN - FUNCTIONAL ASSESSMENT
PAIN_FUNCTIONAL_ASSESSMENT: PREVENTS OR INTERFERES SOME ACTIVE ACTIVITIES AND ADLS
PAIN_FUNCTIONAL_ASSESSMENT: ACTIVITIES ARE NOT PREVENTED

## 2022-06-22 ASSESSMENT — PAIN DESCRIPTION - LOCATION
LOCATION: BACK

## 2022-06-22 ASSESSMENT — PAIN DESCRIPTION - DESCRIPTORS
DESCRIPTORS: ACHING
DESCRIPTORS: ACHING

## 2022-06-22 ASSESSMENT — PAIN DESCRIPTION - ORIENTATION: ORIENTATION: LOWER

## 2022-06-22 ASSESSMENT — PAIN SCALES - WONG BAKER
WONGBAKER_NUMERICALRESPONSE: 0
WONGBAKER_NUMERICALRESPONSE: 0

## 2022-06-22 NOTE — PROGRESS NOTES
Hd nurse called reporting pt is sob and requesting his breathing treatment. I called our RT shala and she is to go and give a treatment.

## 2022-06-22 NOTE — PROGRESS NOTES
Patient is in hd on a3, I called Hd rn she was updated about inr stat ordered, pt is to have tunneled hd catheter place. She is to send sample.

## 2022-06-22 NOTE — PROGRESS NOTES
Hd nurse called requesting ativan, pt is restless during his treatment. Complied. I called Harley regarding his finger and the cultures which are growing out group a strep.  He states that he is doing well.  Pain is well-controlled with Tylenol.  The wound looks fine at this point.  I encouraged him to continue with the Bactrim and Keflex.  Plan is to see him back in clinic next week.  He will return to the emergency department if his symptoms of swelling, drainage and redness worsen.

## 2022-06-22 NOTE — DIALYSIS
Treatment time: 3 hours  Net UF: 3000 ml     Pre Wt: 114kg per bed  post pko216 KG per bed  dry wt: 116. 4KG    Access used: LTDC  Access function: poor with  ml/min, Dr. Garrison Lopez made aware line ran well for 40 minutes then began alarming despite reversing lines and reposition pt and flushing. For Catheter exchange, Coags drawn and sent to lab.     Medications or blood products given: Retacrit 10,000 units IVP     Regular outpatient schedule: [facility] MWF     Summary of response to treatment:  Had episode of severe SOB, resolved with 02 increased to 5L per NC, breathing tx given per respiratiory therapy and ativan . 5mg PO given by floor RN. Dr. Garrison Lopez made aware, medical MD karl also made aware. At end of tx pt denies SOB and is very conversational with easy regular respirations. Crit Line: unable to determine refill Pre HCT 27.9  Post Tx HCT 32.8, Ending profile A, max %BV tolerated 14.9%     Copy of dialysis treatment record placed in chart, to be scanned into EMR.  Report to [facility] RN

## 2022-06-22 NOTE — PROGRESS NOTES
06/21/22 2325   NIV Type   NIV Started/Stopped On   Equipment Type v60   Mode Bilevel   Mask Type Full face mask   Mask Size Medium   Settings/Measurements   IPAP 14 cmH20   CPAP/EPAP 8 cmH2O   Rate Ordered 8   Resp 19   FiO2  30 %   Vt Exhaled 605 mL   Mask Leak (lpm) 38 lpm   Comfort Level Good   Using Accessory Muscles No   Breath Sounds   Right Upper Lobe Diminished   Right Middle Lobe Diminished   Right Lower Lobe Diminished   Left Upper Lobe Diminished   Left Lower Lobe Diminished   Alarm Settings   Alarms On Y   Low Pressure 6 cmH2O   High Pressure  30 cmH2O

## 2022-06-22 NOTE — PROGRESS NOTES
Pt is back to his room, cmu updated, eating breakfast, assessed pt, vs taken, reviewed poc, clarified bedrest order, reviewed poc with nephro. Will call ir. Pt states he will get catheter exchanged tomorrow. eliquis to be held.

## 2022-06-22 NOTE — FLOWSHEET NOTE
06/22/22 0724 06/22/22 1055   Treatment   Time On 0743  --    Time Off  --  1053   Treatment Goal 3000  --    Vital Signs   /80 134/75   Temp 97.8 °F (36.6 °C)  --    Heart Rate 67 77   Resp 18 18   Weight 240 lb 4.8 oz (109 kg) 244 lb 11.4 oz (111 kg)   Weight Method Bed scale Bed scale   Percent Weight Change -4.39 -2.63   Dry Weight 241 lb 6.5 oz (109.5 kg)  --    Post-Hemodialysis Assessment   Rinseback Volume (ml)  --  400 ml   Total Liters Processed (l/min)  --  59.5 l/min   Duration of Treatment (minutes)  --  180 minutes   Hemodialysis Intake (ml)  --  400 ml   Hemodialysis Output (ml)  --  3400 ml   NET Removed (ml)  --  3000   Tolerated Treatment  --  Fair

## 2022-06-22 NOTE — PROGRESS NOTES
Nurse called about [facility] placement. Unable to place today due to pt receiving eliquis and plavix and eating lunch. Will place tomorrow afternoon. All thinners need to be held tonight and tomorrow and will need to be NPO at midnight.

## 2022-06-22 NOTE — PROGRESS NOTES
06/21/22 2220   Oxygen Therapy/Pulse Ox   O2 Device Nasal cannula   O2 Flow Rate (L/min) 2 L/min   FiO2  30 %   Resp 16   SpO2 100 %

## 2022-06-22 NOTE — PROGRESS NOTES
06/22/22 0345   NIV Type   $NIV $Daily Charge   NIV Started/Stopped On   Equipment Type v60   Mode Bilevel   Mask Type Full face mask   Mask Size Medium   Settings/Measurements   IPAP 14 cmH20   CPAP/EPAP 8 cmH2O   Rate Ordered 8   Resp 20   FiO2  30 %   Vt Exhaled 565 mL   Mask Leak (lpm) 36 lpm   Comfort Level Good   Using Accessory Muscles No   Alarm Settings   Alarms On Y   Low Pressure 6 cmH2O   High Pressure  30 cmH2O

## 2022-06-22 NOTE — PROGRESS NOTES
[facility] Progress Note           Subjective/   45y.o. year old male who we are seeing in consultation for ESKD on HD. HPI:  Last HD on 6/21 with 1.9 liters removed, post-weight of 107.2 kg. Seen on dialysis. Orders confirmed. Pre-weight at HD today 114 kg. Access still running at lower blood flows despite tPA trial.    ROS:  +short of breath, no chest pain. Objective/   GEN:  Chronically ill, BP (!) 118/56   Pulse 65   Temp 97.8 °F (36.6 °C) (Oral)   Resp 20   Wt 245 lb 2.4 oz (111.2 kg)   SpO2 94%   BMI 39.57 kg/m²   HEENT: non-icteric, no JVD  CV: S1, S2 without m/r/g; no LE edema  RESP: CTA B without w/r/r; breathing wnl  ABD: +bs, soft, nt, no hsm  SKIN: warm, no rashes  ACCESS: Left IJ [facility]    Data/  Recent Labs     06/21/22  0216 06/22/22  0459   WBC 16.6* 6.8   HGB 10.9* 9.5*   HCT 34.5* 29.9*   MCV 91.8 92.0    304     Recent Labs     06/21/22  0216 06/21/22  0539 06/22/22  0459   * 131* 129*   K 5.6* 4.9 4.7   CL 89* 92* 94*   CO2 21 18* 18*   GLUCOSE 244* 201* 136*   PHOS  --  5.7* 5.9*   MG  --  2.00  --    BUN 57* 56* 40*   CREATININE 7.9* 8.7* 6.2*   LABGLOM 7* 6* 9*   GFRAA 9* 8* 11*       Assessment/     # End stage kidney disease - on HD Mon-Wed-Fri     # sCHF/Ischemic cardiomyopathy - EF worse at ~20-25%      # CAD: s/p PATRICIA RCA 1/14/2022; s/p PATRICIA LAD 2015     # Bicuspid AV/severe AS: s/p TAVR 10/2019     # Hypertension      # Anemia of chronic disease - DAVON with HD     # Hyponatremia - chronic, due to fluid overload, monitor with HD      Plan/     - HD today with 2-3 L UF target as tolerated, outpatient .5 kg  - TDC exchange  - DAVON 10K qHD  - Trend labs, bp's    ____________________________________  Nancy Chrales MD  [facility]  [email or web address]  Office: [phone]

## 2022-06-22 NOTE — PROGRESS NOTES
Hospitalist Progress Note      PCP: Isamar Montiel MD    Date of Admission: 6/21/2022    Chief Complaint: Shortness of breath    Hospital Course:   Patient is a 58-year-old male with a past medical history of ESRD on HD, type 2 diabetes, insomnia/anxiety, GERD, CVA, CAD and ischemic cardiomyopathy who presented for shortness of breath. He states that he takes trazodone at home to sleep. However he ran out and was not sleeping well. He slept through his dialysis time on Monday. So therefore he missed HD on Monday. He presented with increased shortness of breath. He denies any fever or chills. He states he has a cough but however this is nonproductive. Subjective:  Patient seen and examined this morning in dialysis. He is sitting up on the edge of the bed due to shortness of breath. He is extremely short of breath with conversation. However he is satting well at 95% on 5 L. His baseline oxygen requirement is 3 to 4 L nasal cannula. Dialysis nurse had called for a breathing treatment. He was also given 0.5 mg of Ativan. On reassessment of the patient is breathing has improved post Ativan and breathing treatment.       Medications:  Reviewed    Infusion Medications    sodium chloride      dextrose       Scheduled Medications    heparin (porcine)        apixaban  5 mg Oral BID    calcium acetate  1 capsule Oral TID WC    clopidogrel  75 mg Oral Daily    docusate sodium  100 mg Oral Daily    DULoxetine  30 mg Oral Daily    isosorbide dinitrate  20 mg Oral TID    linaclotide  145 mcg Oral QAM AC    metoprolol succinate  50 mg Oral BID    pantoprazole  40 mg Oral QAM AC    pravastatin  40 mg Oral Daily    QUEtiapine  50 mg Oral Nightly    tiotropium-olodaterol  2 puff Inhalation Daily    traZODone  50 mg Oral Nightly    insulin lispro  0-6 Units SubCUTAneous TID WC    insulin lispro  0-3 Units SubCUTAneous Nightly    epoetin siddharth-epbx  10,000 Units IntraVENous Q MWF     PRN Meds: oxyCODONE-acetaminophen, sodium chloride flush, sodium chloride, ondansetron **OR** ondansetron, acetaminophen **OR** acetaminophen, calcium carbonate, melatonin, LORazepam, glucose, dextrose bolus **OR** dextrose bolus, glucagon (rDNA), dextrose      Intake/Output Summary (Last 24 hours) at 6/22/2022 1034  Last data filed at 6/21/2022 2333  Gross per 24 hour   Intake 600 ml   Output --   Net 600 ml       Physical Exam Performed:    BP (!) 118/56   Pulse 65   Temp 97.8 °F (36.6 °C) (Oral)   Resp 20   Wt 245 lb 2.4 oz (111.2 kg)   SpO2 94%   BMI 39.57 kg/m²     General appearance: Mild distress, appears stated age and cooperative. HEENT: Conjunctivae/corneas clear. Neck: No jugular venous distention. Respiratory: Increased respiratory effort. Decreased air movement  Cardiovascular: Regular rate and rhythm without murmurs, rubs or gallops. Abdomen: Soft, non-tender, non-distended with normal bowel sounds. Musculoskeletal: No clubbing, cyanosis or edema bilaterally. Skin: Skin color, texture, turgor normal.  No rashes or lesions. Neurologic:  Neurovascularly intact without any focal sensory/motor deficits. Psychiatric: Alert and oriented, thought content appropriate, normal insight    Labs:   Recent Labs     06/21/22 0216 06/22/22 0459   WBC 16.6* 6.8   HGB 10.9* 9.5*   HCT 34.5* 29.9*    304     Recent Labs     06/21/22 0216 06/21/22  0539 06/22/22 0459   * 131* 129*   K 5.6* 4.9 4.7   CL 89* 92* 94*   CO2 21 18* 18*   BUN 57* 56* 40*   CREATININE 7.9* 8.7* 6.2*   CALCIUM 9.3 9.4 8.7   PHOS  --  5.7* 5.9*     Recent Labs     06/21/22 0216   AST 30   ALT 13   BILITOT 0.3   ALKPHOS 121     No results for input(s): INR in the last 72 hours.   Recent Labs     06/21/22 0216   TROPONINI 0.10*       Urinalysis:      Lab Results   Component Value Date    NITRU Negative 05/29/2022    WBCUA 10-20 05/29/2022    BACTERIA 3+ 05/29/2022    RBCUA 5-10 05/29/2022    BLOODU TRACE-INTACT 05/29/2022 SPECGRAV 1.015 05/29/2022    GLUCOSEU 100 05/29/2022       Radiology:  XR CHEST PORTABLE   Final Result   Findings compatible with interstitial edema and small left effusion. IR TUNNELED CVC PLACE WO SQ PORT/PUMP > 5 YEARS    (Results Pending)           Assessment/Plan:    Active Hospital Problems    Diagnosis     Ischemic cardiomyopathy [I25.5]      Priority: High    Type 2 diabetes, uncontrolled, with neuropathy ([facility] [E11.40, E11.65]      Priority: High    Morbid obesity with BMI of 40.0-44.9, adult (HCC) [E66.01, Z68.41]      Priority: Medium    Asthma-COPD overlap syndrome ([facility] [J44.9]      Priority: Medium    Respiratory failure ([address] [J96.90]     Respiratory failure with hypoxia ([facility] [J96.91]     ESRD (end stage renal disease) ([facility] [N18.6]      1. Pulmonary edema due to missed dialysis session, POA  - Chest x-ray obtained on admission showed interstitial edema and small left effusion  - Hemodialysis on 6/21/2022 with 1.9 L removed  - Hemodialysis today with a goal of 2 to 3 L to be removed    2. ESRD  - HD per nephrology  - Plan for catheter replacement today    3. Insomnia/anxiety  - Trazodone will be increased to 100 mg  - Continue Ativan as needed  - Continue Cymbalta    4. Type 2 diabetes mellitus  - Hemoglobin A1c on 4/18/2022 was 6.1%, repeat was 6.2%  - Will discontinue low-dose insulin    5. GERD  - Continue pantoprazole 40 mg daily    6. PAF  - Currently rate controlled  - Continue metoprolol 50 mg twice daily  - Continue Eliquis 5 mg twice daily    7. CAD, ischemic cardiomyopathy  - Status post PATRICIA of the RCA on 1/14/2022 and PATRICIA of the LAD in 2015  - Echo completed on 6/3/2022 showed an EF of 20 to 25%  - Continue Plavix 75 mg daily  - Continue metoprolol 50 mg twice daily  - Continue isosorbide dinitrate 20 mg 3 times daily  - Continue pravastatin 40 mg daily      DVT Prophylaxis: Eliquis  Diet: ADULT DIET; Regular; 4 carb choices (60 gm/meal);  Low Fat/Low Chol/High Fiber/NIDA; Low Potassium (Less than 3000 mg/day);  Low Phosphorus (Less than 1000 mg)  Code Status: Full Code    PT/OT Eval Status: Not yet ordered    Dispo -2 to 3 days pending clinical improvement    Enrrique Akers DO

## 2022-06-22 NOTE — PROGRESS NOTES
06/21/22 2220   NIV Type   NIV Started/Stopped On   Equipment Type v60   Mode Bilevel   Mask Type Full face mask   Mask Size Medium   Settings/Measurements   IPAP 14 cmH20   CPAP/EPAP 8 cmH2O   Rate Ordered 8   Resp 16   FiO2  30 %   Vt Exhaled 586 mL   Mask Leak (lpm) 49 lpm   Comfort Level Good   Using Accessory Muscles No   Alarm Settings   Alarms On Y

## 2022-06-22 NOTE — PROGRESS NOTES
I attempted to reach IR about tunneled catheter ordered multiple times without success. Detailed message left. Waiting their reply.

## 2022-06-23 ENCOUNTER — APPOINTMENT (OUTPATIENT)
Dept: GENERAL RADIOLOGY | Age: 52
DRG: 189 | End: 2022-06-23
Payer: COMMERCIAL

## 2022-06-23 ENCOUNTER — APPOINTMENT (OUTPATIENT)
Dept: INTERVENTIONAL RADIOLOGY/VASCULAR | Age: 52
DRG: 189 | End: 2022-06-23
Payer: COMMERCIAL

## 2022-06-23 LAB
A/G RATIO: 1.7 (ref 1.1–2.2)
ALBUMIN SERPL-MCNC: 3.8 G/DL (ref 3.4–5)
ALP BLD-CCNC: 89 U/L (ref 40–129)
ALT SERPL-CCNC: 8 U/L (ref 10–40)
ANION GAP SERPL CALCULATED.3IONS-SCNC: 15 MMOL/L (ref 3–16)
AST SERPL-CCNC: 9 U/L (ref 15–37)
BASOPHILS ABSOLUTE: 0.1 K/UL (ref 0–0.2)
BASOPHILS RELATIVE PERCENT: 1.2 %
BILIRUB SERPL-MCNC: <0.2 MG/DL (ref 0–1)
BUN BLDV-MCNC: 32 MG/DL (ref 7–20)
CALCIUM SERPL-MCNC: 9 MG/DL (ref 8.3–10.6)
CHLORIDE BLD-SCNC: 94 MMOL/L (ref 99–110)
CO2: 23 MMOL/L (ref 21–32)
CREAT SERPL-MCNC: 5.1 MG/DL (ref 0.9–1.3)
EOSINOPHILS ABSOLUTE: 0.5 K/UL (ref 0–0.6)
EOSINOPHILS RELATIVE PERCENT: 7.8 %
GFR AFRICAN AMERICAN: 14
GFR NON-AFRICAN AMERICAN: 12
GLUCOSE BLD-MCNC: 130 MG/DL (ref 70–99)
GLUCOSE BLD-MCNC: 186 MG/DL (ref 70–99)
HCT VFR BLD CALC: 28.9 % (ref 40.5–52.5)
HEMOGLOBIN: 9.3 G/DL (ref 13.5–17.5)
IRON SATURATION: 23 % (ref 20–50)
IRON: 45 UG/DL (ref 59–158)
LYMPHOCYTES ABSOLUTE: 1.1 K/UL (ref 1–5.1)
LYMPHOCYTES RELATIVE PERCENT: 18.1 %
MCH RBC QN AUTO: 29.2 PG (ref 26–34)
MCHC RBC AUTO-ENTMCNC: 32.3 G/DL (ref 31–36)
MCV RBC AUTO: 90.5 FL (ref 80–100)
MONOCYTES ABSOLUTE: 0.7 K/UL (ref 0–1.3)
MONOCYTES RELATIVE PERCENT: 12.4 %
NEUTROPHILS ABSOLUTE: 3.6 K/UL (ref 1.7–7.7)
NEUTROPHILS RELATIVE PERCENT: 60.5 %
PDW BLD-RTO: 16.2 % (ref 12.4–15.4)
PERFORMED ON: ABNORMAL
PLATELET # BLD: 290 K/UL (ref 135–450)
PMV BLD AUTO: 8.2 FL (ref 5–10.5)
POTASSIUM REFLEX MAGNESIUM: 4.9 MMOL/L (ref 3.5–5.1)
RBC # BLD: 3.2 M/UL (ref 4.2–5.9)
SODIUM BLD-SCNC: 132 MMOL/L (ref 136–145)
TOTAL IRON BINDING CAPACITY: 192 UG/DL (ref 260–445)
TOTAL PROTEIN: 6 G/DL (ref 6.4–8.2)
WBC # BLD: 6 K/UL (ref 4–11)

## 2022-06-23 PROCEDURE — 2060000000 HC ICU INTERMEDIATE R&B

## 2022-06-23 PROCEDURE — 36010 PLACE CATHETER IN VEIN: CPT

## 2022-06-23 PROCEDURE — 99153 MOD SED SAME PHYS/QHP EA: CPT

## 2022-06-23 PROCEDURE — 37248 TRLUML BALO ANGIOP 1ST VEIN: CPT

## 2022-06-23 PROCEDURE — 85025 COMPLETE CBC W/AUTO DIFF WBC: CPT

## 2022-06-23 PROCEDURE — 94640 AIRWAY INHALATION TREATMENT: CPT

## 2022-06-23 PROCEDURE — 6370000000 HC RX 637 (ALT 250 FOR IP)

## 2022-06-23 PROCEDURE — 83540 ASSAY OF IRON: CPT

## 2022-06-23 PROCEDURE — 94761 N-INVAS EAR/PLS OXIMETRY MLT: CPT

## 2022-06-23 PROCEDURE — 6370000000 HC RX 637 (ALT 250 FOR IP): Performed by: INTERNAL MEDICINE

## 2022-06-23 PROCEDURE — 2500000003 HC RX 250 WO HCPCS: Performed by: INTERNAL MEDICINE

## 2022-06-23 PROCEDURE — 77001 FLUOROGUIDE FOR VEIN DEVICE: CPT

## 2022-06-23 PROCEDURE — 80053 COMPREHEN METABOLIC PANEL: CPT

## 2022-06-23 PROCEDURE — 6360000004 HC RX CONTRAST MEDICATION: Performed by: RADIOLOGY

## 2022-06-23 PROCEDURE — 2580000003 HC RX 258: Performed by: RADIOLOGY

## 2022-06-23 PROCEDURE — 0JH63XZ INSERTION OF TUNNELED VASCULAR ACCESS DEVICE INTO CHEST SUBCUTANEOUS TISSUE AND FASCIA, PERCUTANEOUS APPROACH: ICD-10-PCS | Performed by: RADIOLOGY

## 2022-06-23 PROCEDURE — 6360000002 HC RX W HCPCS: Performed by: RADIOLOGY

## 2022-06-23 PROCEDURE — 94660 CPAP INITIATION&MGMT: CPT

## 2022-06-23 PROCEDURE — 75860 VEIN X-RAY NECK: CPT

## 2022-06-23 PROCEDURE — 37249 TRLUML BALO ANGIOP ADDL VEIN: CPT

## 2022-06-23 PROCEDURE — 36415 COLL VENOUS BLD VENIPUNCTURE: CPT

## 2022-06-23 PROCEDURE — 36589 REMOVAL TUNNELED CV CATH: CPT

## 2022-06-23 PROCEDURE — 83550 IRON BINDING TEST: CPT

## 2022-06-23 PROCEDURE — 2700000000 HC OXYGEN THERAPY PER DAY

## 2022-06-23 PROCEDURE — 36558 INSERT TUNNELED CV CATH: CPT

## 2022-06-23 PROCEDURE — 0JPT3XZ REMOVAL OF TUNNELED VASCULAR ACCESS DEVICE FROM TRUNK SUBCUTANEOUS TISSUE AND FASCIA, PERCUTANEOUS APPROACH: ICD-10-PCS | Performed by: RADIOLOGY

## 2022-06-23 PROCEDURE — C1725 CATH, TRANSLUMIN NON-LASER: HCPCS

## 2022-06-23 PROCEDURE — 02H633Z INSERTION OF INFUSION DEVICE INTO RIGHT ATRIUM, PERCUTANEOUS APPROACH: ICD-10-PCS | Performed by: RADIOLOGY

## 2022-06-23 PROCEDURE — 71045 X-RAY EXAM CHEST 1 VIEW: CPT

## 2022-06-23 PROCEDURE — 75827 VEIN X-RAY CHEST: CPT

## 2022-06-23 PROCEDURE — 99152 MOD SED SAME PHYS/QHP 5/>YRS: CPT

## 2022-06-23 RX ORDER — FENTANYL CITRATE 50 UG/ML
INJECTION, SOLUTION INTRAMUSCULAR; INTRAVENOUS
Status: COMPLETED | OUTPATIENT
Start: 2022-06-23 | End: 2022-06-23

## 2022-06-23 RX ORDER — MIDAZOLAM HYDROCHLORIDE 1 MG/ML
INJECTION INTRAMUSCULAR; INTRAVENOUS
Status: COMPLETED | OUTPATIENT
Start: 2022-06-23 | End: 2022-06-23

## 2022-06-23 RX ORDER — TRAZODONE HYDROCHLORIDE 50 MG/1
150 TABLET ORAL NIGHTLY
Status: DISCONTINUED | OUTPATIENT
Start: 2022-06-23 | End: 2022-06-28 | Stop reason: HOSPADM

## 2022-06-23 RX ADMIN — MIDAZOLAM HYDROCHLORIDE 0.5 MG: 2 INJECTION, SOLUTION INTRAMUSCULAR; INTRAVENOUS at 15:51

## 2022-06-23 RX ADMIN — APIXABAN 5 MG: 5 TABLET, FILM COATED ORAL at 20:42

## 2022-06-23 RX ADMIN — MIDAZOLAM HYDROCHLORIDE 1 MG: 2 INJECTION, SOLUTION INTRAMUSCULAR; INTRAVENOUS at 15:29

## 2022-06-23 RX ADMIN — FENTANYL CITRATE 25 MCG: 50 INJECTION INTRAMUSCULAR; INTRAVENOUS at 15:34

## 2022-06-23 RX ADMIN — TRAZODONE HYDROCHLORIDE 150 MG: 50 TABLET ORAL at 20:42

## 2022-06-23 RX ADMIN — ISOSORBIDE DINITRATE 20 MG: 20 TABLET ORAL at 20:42

## 2022-06-23 RX ADMIN — IOVERSOL 10 ML: 741 INJECTION INTRA-ARTERIAL; INTRAVENOUS at 15:38

## 2022-06-23 RX ADMIN — CALCIUM ACETATE 667 MG: 667 CAPSULE ORAL at 17:03

## 2022-06-23 RX ADMIN — TIOTROPIUM BROMIDE AND OLODATEROL 2 PUFF: 3.124; 2.736 SPRAY, METERED RESPIRATORY (INHALATION) at 09:14

## 2022-06-23 RX ADMIN — QUETIAPINE FUMARATE 50 MG: 25 TABLET ORAL at 20:42

## 2022-06-23 RX ADMIN — FENTANYL CITRATE 25 MCG: 50 INJECTION INTRAMUSCULAR; INTRAVENOUS at 15:51

## 2022-06-23 RX ADMIN — METOPROLOL SUCCINATE 50 MG: 50 TABLET, EXTENDED RELEASE ORAL at 20:42

## 2022-06-23 RX ADMIN — METOPROLOL SUCCINATE 50 MG: 50 TABLET, EXTENDED RELEASE ORAL at 08:34

## 2022-06-23 RX ADMIN — MIDAZOLAM HYDROCHLORIDE 0.5 MG: 2 INJECTION, SOLUTION INTRAMUSCULAR; INTRAVENOUS at 15:34

## 2022-06-23 RX ADMIN — PANTOPRAZOLE SODIUM 40 MG: 40 TABLET, DELAYED RELEASE ORAL at 08:34

## 2022-06-23 RX ADMIN — FENTANYL CITRATE 50 MCG: 50 INJECTION INTRAMUSCULAR; INTRAVENOUS at 15:29

## 2022-06-23 RX ADMIN — CEFAZOLIN 2000 MG: 1 INJECTION, POWDER, FOR SOLUTION INTRAMUSCULAR; INTRAVENOUS at 15:28

## 2022-06-23 ASSESSMENT — PAIN SCALES - GENERAL: PAINLEVEL_OUTOF10: 0

## 2022-06-23 NOTE — OR NURSING
Pt arrived for image guided tunneled dialysis catheter exchange left IJ . Procedure explained including the risk and benefits of the procedure. All questions answered. Pt verbalizes understanding of the procedure and states no more questions. Consent confirmed. Vital signs stable. Labs, allergies, medications, and code status reviewed. No contraindications noted. Vital Signs  Vitals:    06/23/22 1535   BP: 134/80   Pulse: 69   Resp: 20   Temp:    SpO2: 100%    (vital signs in table format)    Pre Ernesto Score  2 - Able to move 4 extremities voluntarily on command  2 - BP+/- 20mmHg of normal  2 - Fully awake  1 - Needs oxygen to maintain oxygen saturation >90%  2 - Able to breathe deeply and cough freely    Allergies  Morphine (allergies)    Labs  Lab Results   Component Value Date    INR 1.19 (H) 06/22/2022    PROTIME 14.9 (H) 06/22/2022     Lab Results   Component Value Date    CREATININE 5.1 (HH) 06/23/2022    BUN 32 (H) 06/23/2022     (L) 06/23/2022    GFR >60 05/17/2013    K 4.9 06/23/2022    CL 94 (L) 06/23/2022    CO2 23 06/23/2022     Lab Results   Component Value Date    WBC 6.0 06/23/2022    HGB 9.3 (L) 06/23/2022    HCT 28.9 (L) 06/23/2022    MCV 90.5 06/23/2022     06/23/2022     Time out completed prior to procedure. Pt was place supine on the IR table. Pt was placed on all cardiac monitoring. Pt arrived on 4 L NC for sedation.

## 2022-06-23 NOTE — CARE COORDINATION
Patient s/p vas cath placement today. He gets dialysis on MWF schedule at the [place] location. He has oxygen at home through [address] [place] [address]. He is independent at home. He will discharge with [facility] care and CM will arrange at discharge. CM continues to follow should any additional  needs arise.

## 2022-06-23 NOTE — PROGRESS NOTES
06/23/22 0420   NIV Type   Equipment Type V60   Mode Bilevel   Mask Type Full face mask   Mask Size Medium   Settings/Measurements   IPAP 14 cmH20   CPAP/EPAP 8 cmH2O   Rate Ordered 8   Resp 17   FiO2  30 %   Vt Exhaled 505 mL   Minute Volume 7.4 Liters   Mask Leak (lpm) 50 lpm   Comfort Level Good   Using Accessory Muscles No   Alarm Settings   Alarms On Y

## 2022-06-23 NOTE — OR NURSING
Image guided tunneled dialysis catheter completed. Dr. Varner Pod placed a 14.5 Portuguese 23 cm [employer] tunneled dialysis catheter LOT [id number] 12/31/2023 in the left IJ. Line aspirates and flushes with ease. Dialysis catheter secured with sutures. Surgical site dressing clean, dry, and intact. Each dialysis access lumen heparin locked with 1.8 ml of IV heparin each. Pt tolerated procedure without any signs or symptoms of distress. Vital signs stable. Report called to  RN . Pt transported back to [facility] in stable condition via bed by transport.  Line ok to use per HOENNINGER.    2 MG VERSED  100 MCG FENTANYL  2 G ANCEF    Vital Signs  Vitals:    06/23/22 1601   BP: 128/72   Pulse: 66   Resp: 20   Temp:    SpO2: 99%

## 2022-06-23 NOTE — PROGRESS NOTES
Hospitalist Progress Note      PCP: Joe Hyde MD    Date of Admission: 6/21/2022    Chief Complaint: Shortness of breath    Hospital Course:   Patient is a 68-year-old male with a past medical history of ESRD on HD, type 2 diabetes, insomnia/anxiety, GERD, CVA, CAD and ischemic cardiomyopathy who presented for shortness of breath. He states that he takes trazodone at home to sleep. However he ran out and was not sleeping well. He slept through his dialysis time on Monday. So therefore he missed HD on Monday. He presented with increased shortness of breath. He denies any fever or chills. He states he has a cough but however this is nonproductive. Subjective:  Patient seen and examined this morning. He was on BiPAP. He states he still feels like he is short of breath. He states he did not sleep well overnight. He denies any nausea, vomiting, constipation, diarrhea.     Medications:  Reviewed    Infusion Medications    sodium chloride      dextrose       Scheduled Medications    traZODone  100 mg Oral Nightly    apixaban  5 mg Oral BID    [START ON 6/24/2022] clopidogrel  75 mg Oral Daily    calcium acetate  1 capsule Oral TID WC    docusate sodium  100 mg Oral Daily    DULoxetine  30 mg Oral Daily    isosorbide dinitrate  20 mg Oral TID    linaclotide  145 mcg Oral QAM AC    metoprolol succinate  50 mg Oral BID    pantoprazole  40 mg Oral QAM AC    pravastatin  40 mg Oral Daily    QUEtiapine  50 mg Oral Nightly    tiotropium-olodaterol  2 puff Inhalation Daily    epoetin siddharth-epbx  10,000 Units IntraVENous Q MWF     PRN Meds: oxyCODONE-acetaminophen, sodium chloride flush, sodium chloride, ondansetron **OR** ondansetron, acetaminophen **OR** acetaminophen, calcium carbonate, melatonin, LORazepam, glucose, dextrose bolus **OR** dextrose bolus, glucagon (rDNA), dextrose      Intake/Output Summary (Last 24 hours) at 6/23/2022 0950  Last data filed at 6/22/2022 1718  Gross per 24 hour   Intake 2320 ml   Output 3400 ml   Net -1080 ml       Physical Exam Performed:    /79   Pulse 62   Temp 97.5 °F (36.4 °C) (Oral)   Resp 16   Wt 242 lb 1 oz (109.8 kg)   SpO2 99%   BMI 39.07 kg/m²     General appearance: No acute distress, appears stated age and cooperative. HEENT: Conjunctivae/corneas clear. Neck: No jugular venous distention. Respiratory: On BiPAP  Cardiovascular: Regular rate and rhythm without murmurs, rubs or gallops. Abdomen: Soft, non-tender, non-distended with normal bowel sounds. Musculoskeletal: No clubbing, cyanosis or edema bilaterally. Skin: Skin color, texture, turgor normal.  No rashes or lesions. Neurologic:  Neurovascularly intact without any focal sensory/motor deficits. Psychiatric: Alert and oriented, thought content appropriate, normal insight    Labs:   Recent Labs     06/21/22  0216 06/22/22  0459 06/23/22  0504   WBC 16.6* 6.8 6.0   HGB 10.9* 9.5* 9.3*   HCT 34.5* 29.9* 28.9*    304 290     Recent Labs     06/21/22  0539 06/22/22  0459 06/23/22  0504   * 129* 132*   K 4.9 4.7 4.9   CL 92* 94* 94*   CO2 18* 18* 23   BUN 56* 40* 32*   CREATININE 8.7* 6.2* 5.1*   CALCIUM 9.4 8.7 9.0   PHOS 5.7* 5.9*  --      Recent Labs     06/21/22  0216 06/23/22  0504   AST 30 9*   ALT 13 8*   BILITOT 0.3 <0.2   ALKPHOS 121 89     Recent Labs     06/22/22  1035   INR 1.19*     Recent Labs     06/21/22 0216   TROPONINI 0.10*       Urinalysis:      Lab Results   Component Value Date    NITRU Negative 05/29/2022    WBCUA 10-20 05/29/2022    BACTERIA 3+ 05/29/2022    RBCUA 5-10 05/29/2022    BLOODU TRACE-INTACT 05/29/2022    SPECGRAV 1.015 05/29/2022    GLUCOSEU 100 05/29/2022       Radiology:  XR CHEST PORTABLE   Final Result   Findings compatible with interstitial edema and small left effusion.          IR TUNNELED CVC PLACE WO SQ PORT/PUMP > 5 YEARS    (Results Pending)           Assessment/Plan:    Active Hospital Problems    Diagnosis     Ischemic cardiomyopathy [I25.5]      Priority: High    Type 2 diabetes, uncontrolled, with neuropathy ([facility] [E11.40, E11.65]      Priority: High    Morbid obesity with BMI of 40.0-44.9, adult ([facility]) [E66.01, Z68.41]      Priority: Medium    Asthma-COPD overlap syndrome ([facility] [J44.9]      Priority: Medium    Respiratory failure ([facility]) [J96.90]     Respiratory failure with hypoxia ([facility] [J96.91]     ESRD (end stage renal disease) ([facility] [N18.6]      1. Pulmonary edema due to missed dialysis session, POA  - Chest x-ray obtained on admission showed interstitial edema and small left effusion  - Will repeat chest x-ray today. - Hemodialysis on 6/21/2022 with 1.9 L removed, 3 L removed on 6/22/2022    2. ESRD  - HD per nephrology  - Plan for catheter replacement today    3. Insomnia/anxiety  - Trazodone will be increased to 150 mg  - Continue Ativan as needed  - Continue Cymbalta    4. Type 2 diabetes mellitus  - Hemoglobin A1c on 4/18/2022 was 6.1%, repeat was 6.2%  - No therapy needed    5. GERD  - Continue pantoprazole 40 mg daily    6. PAF  - Currently rate controlled  - Continue metoprolol 50 mg twice daily  - Continue Eliquis 5 mg twice daily    7.   CAD, ischemic cardiomyopathy  - Status post PATRICIA of the RCA on 1/14/2022 and PATRICIA of the LAD in 2015  - Echo completed on 6/3/2022 showed an EF of 20 to 25%  - Continue Plavix 75 mg daily  - Continue metoprolol 50 mg twice daily  - Continue isosorbide dinitrate 20 mg 3 times daily  - Continue pravastatin 40 mg daily      DVT Prophylaxis: Eliquis  Diet: Diet NPO  Code Status: Full Code    PT/OT Eval Status: Not required    Dispo - 1-2 days pending clinical improvement    Ramonita Carlisle DO

## 2022-06-23 NOTE — PROGRESS NOTES
[facility] Progress Note           Subjective/   45y.o. year old male who we are seeing in consultation for ESKD on HD. HPI:  Last HD on 6/22 with 3 liters removed, post-weight of 111 kg. Access still running at lower blood flows despite tPA trial.  Intake adequate. ROS:  +short of breath, remains on 4 L NC, no change with HD, no chest pain. Objective/   GEN:  Chronically ill, /79   Pulse 61   Temp 97.5 °F (36.4 °C) (Oral)   Resp 18   Wt 242 lb 1 oz (109.8 kg)   SpO2 100%   BMI 39.07 kg/m²   HEENT: non-icteric, no JVD  CV: S1, S2 without m/r/g; no LE edema  RESP: CTA B without w/r/r; breathing wnl  ABD: +bs, soft, nt, no hsm  SKIN: warm, no rashes  ACCESS: Left IJ [facility]    Data/  Recent Labs     06/21/22  0216 06/22/22  0459 06/23/22  0504   WBC 16.6* 6.8 6.0   HGB 10.9* 9.5* 9.3*   HCT 34.5* 29.9* 28.9*   MCV 91.8 92.0 90.5    304 290     Recent Labs     06/21/22  0539 06/22/22  0459 06/23/22  0504   * 129* 132*   K 4.9 4.7 4.9   CL 92* 94* 94*   CO2 18* 18* 23   GLUCOSE 201* 136* 186*   PHOS 5.7* 5.9*  --    MG 2.00  --   --    BUN 56* 40* 32*   CREATININE 8.7* 6.2* 5.1*   LABGLOM 6* 9* 12*   GFRAA 8* 11* 14*       Assessment/     # End stage kidney disease - on HD Mon-Wed-Fri     # sCHF/Ischemic cardiomyopathy - EF worse at ~20-25%      # CAD: s/p PATRICIA RCA 1/14/2022; s/p PATRICIA LAD 2015     # Bicuspid AV/severe AS: s/p TAVR 10/2019     # Hypertension      # Anemia of chronic disease - DAVON with HD     # Hyponatremia - chronic, due to fluid overload, monitor with HD      Plan/     - HD tomorrow - UF target as tolerated, outpatient .5 kg  - TDC exchange due to malfunction  - DAVON 10K qHD  - Trend labs, bp's    ____________________________________  Mir Kitchen MD  [facility]  [email or web address]  Office: [phone]

## 2022-06-23 NOTE — BRIEF OP NOTE
Brief Postoperative Note    April Vo  YOB: 1970  [id number]    Pre-operative Diagnosis: Malfunctioning HD cath    Post-operative Diagnosis: Same    Procedure: Exchange and fibrin sheath disruption    Anesthesia: Local and Moderate Sedation    Surgeons/Assistants: Jayy Simon MD    Estimated Blood Loss: less than 50     Complications: None    Specimens: Was Not Obtained    Findings: Prophylactic fibrin sheath disruption with 12mm balloon. Placed a 23 cm HD catheter into prox right atrium. Working well. Tolerated well. No immediate complications.     Electronically signed by Sulaiman Hassan MD on 6/23/2022 at 4:04 PM

## 2022-06-23 NOTE — DISCHARGE INSTR - COC
Continuity of Care Form    Patient Name: Edward Berumen   :  1970  MRN:  [id number]    Admit date:  2022  Discharge date:  2022    Code Status Order: Full Code   Advance Directives:      Admitting Physician:  Yuval Abreu MD  PCP: Morelia Hamilton MD    Discharging Nurse: [facility] Unit/Room#: 8520/5161-42  Discharging Unit Phone Number: [phone]    Emergency Contact:   Extended Emergency Contact Information  Primary Emergency Contact: Crystal Ann  Address: [address] [facility], [address] [facility] Shadi Crowley of [address] [facility] Phone: [phone]  Mobile Phone: [phone]  Relation: Spouse  Secondary Emergency Contact: Sabrina Tripp  Mobile Phone: [phone]  Relation: Brother/Sister  Preferred language: English   needed?  No    Past Surgical History:  Past Surgical History:   Procedure Laterality Date    AORTIC VALVE REPLACEMENT N/A 10/15/2019    TRANSCATHETER AORTIC VALVE REPLACEMENT FEMORAL APPROACH performed by Purnima Sandoval MD at [address] [facility] Right 2019    PERITONEAL DIALYSIS CATHETER REMOVAL performed by Elise Mac MD at [address] [place] [address]      WN    CORONARY ANGIOPLASTY WITH STENT PLACEMENT  05/26/15    CYST REMOVAL  2013    EXCISION CYSTS, NECK X2 AND ABDOMINAL benign    DIAGNOSTIC CARDIAC CATH LAB PROCEDURE      DIALYSIS FISTULA CREATION Left 10/30/2017    LEFT BRACHIAL CEPHALIC FISTULA    DIALYSIS FISTULA CREATION Left 3/27/2019    LIGATION  AV FISTULA performed by Bandar Rojas MD at [address] [place] [address], [facility]      IR TUNNELED CATHETER PLACEMENT GREATER THAN 5 YEARS  3/21/2022    IR TUNNELED CATHETER PLACEMENT GREATER THAN 5 YEARS 3/21/2022 [facility]    IR TUNNELED CATHETER PLACEMENT GREATER THAN 5 YEARS  2022    IR TUNNELED CATHETER PLACEMENT GREATER THAN 5 YEARS 2022 [facility]    IR TUNNELED CATHETER PLACEMENT GREATER THAN 5 YEARS  4/26/2022    IR TUNNELED CATHETER PLACEMENT GREATER THAN 5 YEARS 4/26/2022 [facility]    OTHER SURGICAL HISTORY  02/01/2017    laparoscopic cholecystectomy with intraoperative cholangiogram    OTHER SURGICAL HISTORY  2018    PORT PLACEMENT  - vas cath    OTHER SURGICAL HISTORY Bilateral 06/26/2018    laprascopic peritoneal dialysis catheter placement    OTHER SURGICAL HISTORY Right 09/2018    peritoneal dialysis port placed on right side of abdomen    OTHER SURGICAL HISTORY  05/28/2019    PTA/Stenting R External Iliac artery    CO LAP INSERTION TUNNELED INTRAPERITONEAL CATHETER N/A 9/21/2018    LAPAROSCOPIC PERITONEAL DIALYSIS CATHETER REPLACEMENT performed by Otoniel Carlos MD at [address] 2/24/2022    PERINEAL ABCESS DRAINAGE performed by Otoniel Carlos MD at [address]  01/06/2016    UPPER GASTROINTESTINAL ENDOSCOPY  01/29/2017    possible candida, otherwise normal appearing    VASCULAR SURGERY  aprx 2 years ago    2 stents placed, each side of groin       Immunization History:   Immunization History   Administered Date(s) Administered    Hepatitis B Adult (Engerix-B) 11/18/2017, 06/13/2018, 07/13/2018    Influenza Virus Vaccine 12/27/2012, 10/07/2014, 11/20/2015, 10/16/2019    Influenza, Intradermal, Quadrivalent, Preservative Free 11/16/2016    Influenza, MDCK Quadv, IM, PF (Flucelvax 2 yrs and older) 10/14/2017, 09/30/2020, 10/05/2021    Influenza, Mohamud Pulse, 6 mo and older, IM, PF (Flulaval, Fluarix) 09/15/2018    Influenza, Quadv, IM, PF (6 mo and older Fluzone, Flulaval, Fluarix, and 3 yrs and older Afluria) 10/16/2019    PPD Test 11/09/2017, 11/16/2017, 01/03/2019, 01/06/2020, 01/15/2021    Pneumococcal Conjugate 13-valent ([id number]) 10/14/2017    Pneumococcal Polysaccharide (Hyhocghmt19) 10/07/2014, 11/19/2019    Tdap (Boostrix, Adacel) 02/13/2020 Zoster Recombinant (Shingrix) 02/13/2020       Active Problems:  Patient Active Problem List   Diagnosis Code    Sepsis without acute organ dysfunction ([facility]) A41.9    CHF (congestive heart failure) ([facility]) I50.9    Asthma-COPD overlap syndrome ([facility] J44.9    Coronary artery disease of native artery of native heart with stable angina pectoris ([facility]) I25.118    PVD (peripheral vascular disease) ([facility] I73.9    Bicuspid aortic valve Q23.1    Bilateral hilar adenopathy syndrome R59.0    Claudication in peripheral vascular disease ([facility]) I73.9    HTN (hypertension), benign I10    Diabetic neuropathy ([facility]) E11.40    Type 2 diabetes, uncontrolled, with neuropathy ([facility] E11.40, E11.65    Passive smoke exposure Z77.22    Depression with anxiety F41.8    PNA (pneumonia) J18.9    Obesity (BMI 30-39. 9) E66.9    ZAINAB on CPAP G47.33, Z99.89    Degeneration of lumbar or lumbosacral intervertebral disc M51.37    Lumbar radiculopathy M54.16    Lumbosacral spondylosis without myelopathy F87.008    Biliary colic P29.92    Symptomatic cholelithiasis K80.20    Gastroparesis due to DM ([facility]) E11.43, K31.84    Elevated troponin R77.8    Angina, class IV ([facility]) I20.9    Dyspnea R06.00    Dyslipidemia E78.5    Acute on chronic combined systolic and diastolic heart failure ([facility]) I50.43    Ischemic cardiomyopathy I25.5    Tobacco abuse Z72.0    CVA (cerebral vascular accident) ([facility] I63.9    History of CVA (cerebrovascular accident) Z86.73    Type 2 diabetes mellitus without complication, without long-term current use of insulin ([facility]) E11.9    ZAINAB (obstructive sleep apnea) G47.33    Pleural effusion J90    Chronic anemia D64.9    Nonrheumatic aortic valve stenosis I35.0    Mucus plugging of bronchi T17.500A    Hemodialysis-associated hypotension I95.3    ESRD (end stage renal disease) ([facility]) N18.6    Hypotension due to drugs S77.9    Acute diastolic CHF (congestive heart failure) ([facility]) I50.31    Neuromuscular disorder ([facility]) G70.9    Renovascular hypertension I15.0    Mixed hyperlipidemia E78.2    Cigarette nicotine dependence in remission F17.211    Pulmonary edema J81.1    Fluid overload E87.70    Anemia of chronic disease D63.8    SOB (shortness of breath) on exertion R06.02    Steal syndrome of dialysis vascular access [facility]) T82.898A    Chronic, continuous use of opioids F11.90    Chronic bronchitis ([facility]) J42    Nasal congestion R09.81    Hypercholesteremia E78.00    Bradycardia R00.1    History of transcatheter aortic valve replacement (TAVR) Z95.2    Syncope and collapse R55    PAF (paroxysmal atrial fibrillation) ([facility]) I48.0    Bilateral leg weakness R29.898    GBS (Guillain-Dublin syndrome) ([facility]) G61.0    Sinus pause I45.5    Weakness of both lower extremities R29.898    Sinus bradycardia R00.1    Ataxia R27.0    Peripheral vascular occlusive disease ([facility]) I73.9    Cellulitis of right foot L03.115    Iliac artery occlusion, right ([facility]) I74.5    Cellulitis and abscess of hand L03.119, L02.519    Type 2 diabetes mellitus with hyperglycemia ([facility]) E11.65    Acute encephalopathy G93.40    Acute hypoxemic respiratory failure ([facility]) J96.01    Acute CVA (cerebrovascular accident) ([facility] I63.9    Speech problem R47.9    Urinary tract infection with hematuria N39.0, R31.9    Respiratory failure with hypoxia ([facility]) J96.91    Acute respiratory failure with hypercapnia ([facility]) J96.02    Acute pulmonary edema ([facility]) J81.0    Grade II diastolic dysfunction J57.34    Shock circulatory ([facility]) R57.9    Smoker F17.200    Normocytic normochromic anemia D64.9    NSTEMI (non-ST elevated myocardial infarction) ([facility]) I21.4    SIRS (systemic inflammatory response syndrome) ([facility]) R65.10    Atrial flutter ([facility]) I48.92    Liberty-rectal abscess K61.1    Somnolence R40.0    Pulmonary edema with diastolic CHF, NYHA class 3 ([facility]) I50.30    Respiratory failure ([facility]) J96.90    Former smoker Z87.891    Cardiomyopathy ([facility] I42.9    Morbid obesity with BMI of 40.0-44.9, adult ([facility]) E66.01, Z68.41    Hypoglycemia E16.2    Altered mental status R41.82       Isolation/Infection:   Isolation            No Isolation          Patient Infection Status       Infection Onset Added Last Indicated Last Indicated By Review Planned Expiration Resolved Resolved By    None active    Resolved    COVID-19 (Rule Out) 05/29/22 05/29/22 05/29/22 COVID-19, Rapid (Ordered)   05/29/22 Rule-Out Test Resulted    COVID-19 (Rule Out) 04/18/22 04/18/22 04/18/22 COVID-19, Rapid (Ordered)   04/18/22 Rule-Out Test Resulted    COVID-19 (Rule Out) 02/25/22 02/25/22 02/25/22 COVID-19, Rapid (Ordered)   02/25/22 Rule-Out Test Resulted    COVID-19 (Rule Out) 01/12/22 01/12/22 01/12/22 COVID-19, Rapid (Ordered)   01/12/22 Rule-Out Test Resulted    COVID-19 (Rule Out) 11/29/21 11/29/21 11/29/21 COVID-19, Rapid (Ordered)   11/30/21 Rule-Out Test Resulted    COVID-19 (Rule Out) 08/29/21 08/29/21 08/29/21 COVID-19 (Ordered)   08/29/21 Rule-Out Test Resulted    COVID-19 (Rule Out) 12/18/20 12/18/20 12/18/20 COVID-19 (Ordered)   12/18/20 Rule-Out Test Resulted    COVID-19 (Rule Out) 05/04/20 05/04/20 05/04/20 COVID-19 (Ordered)   05/04/20 Rule-Out Test Resulted            Nurse Assessment:  Last Vital Signs: /72   Pulse 66   Temp 97.5 °F (36.4 °C) (Oral)   Resp 20   Wt 242 lb 1 oz (109.8 kg)   SpO2 99%   BMI 39.07 kg/m²     Last documented pain score (0-10 scale): Pain Level: 0  Last Weight:   Wt Readings from Last 1 Encounters:   06/23/22 242 lb 1 oz (109.8 kg)     Mental Status:  oriented and alert    IV Access:  - Dialysis Catheter  - site  right, insertion date:      Nursing Mobility/ADLs:  Walking   Independent  Transfer  Independent  Bathing  Independent  Dressing  Independent  Toileting  Independent  Feeding  Independent  Med Admin  Independent  Med Delivery   none    Wound Care Documentation and Therapy:  Wound 08/30/21 Toe (Comment  which one) Right Great Toe (Active)   Number of days: 297       Wound 01/12/22 Pedal Anterior;Right Healing scab from previous blister (per pt), flaky edges (Active)   Number of days: 162       Wound 01/12/22 Toe (Comment  which one) Anterior;Right Scab from old blister (per pt) on 4th toe, flaky edges (Active)   Number of days: 162       Incision 02/24/22 Perineum (Active)   Number of days: 118        Elimination:  Continence: Bowel: Yes  Bladder: Yes  Urinary Catheter: None   Colostomy/Ileostomy/Ileal Conduit: No       Date of Last BM: 6/28/2022    Intake/Output Summary (Last 24 hours) at 6/23/2022 1725  Last data filed at 6/23/2022 1040  Gross per 24 hour   Intake --   Output 200 ml   Net -200 ml     I/O last 3 completed shifts: In: 2560 [P.O.:2160]  Out: 3400     Safety Concerns:     None    Impairments/Disabilities:      None    Nutrition Therapy:  Current Nutrition Therapy:   - Oral Diet:  General    Routes of Feeding: Oral  Liquids: No Restrictions  Daily Fluid Restriction: no  Last Modified Barium Swallow with Video (Video Swallowing Test): not done    Treatments at the Time of Hospital Discharge:   Respiratory Treatments: Inhalers  Oxygen Therapy:  is on oxygen at 1.5 L/min per nasal cannula.   Ventilator:    - CPAP   only when sleeping    Rehab Therapies: None  Weight Bearing Status/Restrictions: No weight bearing restrictions  Other Medical Equipment (for information only, NOT a DME order):  None  Other Treatments: None    Patient's personal belongings (please select all that are sent with patient):  Pants, footwear, shirt    RN SIGNATURE:  Electronically signed by Jessica Gonzalez RN on 6/28/22 at 2:41 PM EDT    CASE MANAGEMENT/SOCIAL WORK SECTION    Inpatient Status Date: 06/21/2022    Readmission Risk Assessment Score:  Readmission Risk              Risk of Unplanned Readmission:  88           Discharging to Facility/ Agency   [address]   [address] [facility]   / signature: Electronically signed by Awa Escoto RN on 6/28/22 at 1:21 PM EDT    PHYSICIAN SECTION    Prognosis: Fair    Condition at Discharge: Stable    Rehab Potential (if transferring to Rehab): Good    Recommended Labs or Other Treatments After Discharge: Continue HD    Physician Certification: I certify the above information and transfer of Carlton Bergman  is necessary for the continuing treatment of the diagnosis listed and that he requires Home Care for less 30 days.      Update Admission H&P: Changes in H&P as follows - see discharge summary    PHYSICIAN SIGNATURE:  Electronically signed by Meghan Owens DO on 6/28/22 at 1:45 PM EDT

## 2022-06-24 LAB
A/G RATIO: 1.6 (ref 1.1–2.2)
ALBUMIN SERPL-MCNC: 3.6 G/DL (ref 3.4–5)
ALP BLD-CCNC: 86 U/L (ref 40–129)
ALT SERPL-CCNC: <5 U/L (ref 10–40)
ANION GAP SERPL CALCULATED.3IONS-SCNC: 15 MMOL/L (ref 3–16)
AST SERPL-CCNC: 7 U/L (ref 15–37)
BASOPHILS ABSOLUTE: 0.1 K/UL (ref 0–0.2)
BASOPHILS RELATIVE PERCENT: 0.9 %
BILIRUB SERPL-MCNC: <0.2 MG/DL (ref 0–1)
BUN BLDV-MCNC: 41 MG/DL (ref 7–20)
CALCIUM SERPL-MCNC: 9.1 MG/DL (ref 8.3–10.6)
CHLORIDE BLD-SCNC: 90 MMOL/L (ref 99–110)
CO2: 22 MMOL/L (ref 21–32)
CREAT SERPL-MCNC: 7.3 MG/DL (ref 0.9–1.3)
EOSINOPHILS ABSOLUTE: 0.5 K/UL (ref 0–0.6)
EOSINOPHILS RELATIVE PERCENT: 5.4 %
GFR AFRICAN AMERICAN: 9
GFR NON-AFRICAN AMERICAN: 8
GLUCOSE BLD-MCNC: 129 MG/DL (ref 70–99)
GLUCOSE BLD-MCNC: 140 MG/DL (ref 70–99)
GLUCOSE BLD-MCNC: 156 MG/DL (ref 70–99)
GLUCOSE BLD-MCNC: 339 MG/DL (ref 70–99)
HCT VFR BLD CALC: 29 % (ref 40.5–52.5)
HEMOGLOBIN: 9.4 G/DL (ref 13.5–17.5)
LYMPHOCYTES ABSOLUTE: 1 K/UL (ref 1–5.1)
LYMPHOCYTES RELATIVE PERCENT: 11.7 %
MAGNESIUM: 2.1 MG/DL (ref 1.8–2.4)
MCH RBC QN AUTO: 29.2 PG (ref 26–34)
MCHC RBC AUTO-ENTMCNC: 32.5 G/DL (ref 31–36)
MCV RBC AUTO: 89.9 FL (ref 80–100)
MONOCYTES ABSOLUTE: 0.7 K/UL (ref 0–1.3)
MONOCYTES RELATIVE PERCENT: 8.3 %
NEUTROPHILS ABSOLUTE: 6.4 K/UL (ref 1.7–7.7)
NEUTROPHILS RELATIVE PERCENT: 73.7 %
PDW BLD-RTO: 16.3 % (ref 12.4–15.4)
PERFORMED ON: ABNORMAL
PLATELET # BLD: 287 K/UL (ref 135–450)
PMV BLD AUTO: 8 FL (ref 5–10.5)
POTASSIUM REFLEX MAGNESIUM: 5.1 MMOL/L (ref 3.5–5.1)
POTASSIUM SERPL-SCNC: 5.1 MMOL/L (ref 3.5–5.1)
RBC # BLD: 3.22 M/UL (ref 4.2–5.9)
SODIUM BLD-SCNC: 127 MMOL/L (ref 136–145)
TOTAL PROTEIN: 5.8 G/DL (ref 6.4–8.2)
WBC # BLD: 8.7 K/UL (ref 4–11)

## 2022-06-24 PROCEDURE — 94660 CPAP INITIATION&MGMT: CPT

## 2022-06-24 PROCEDURE — 94640 AIRWAY INHALATION TREATMENT: CPT

## 2022-06-24 PROCEDURE — 6360000002 HC RX W HCPCS: Performed by: INTERNAL MEDICINE

## 2022-06-24 PROCEDURE — 85025 COMPLETE CBC W/AUTO DIFF WBC: CPT

## 2022-06-24 PROCEDURE — 94761 N-INVAS EAR/PLS OXIMETRY MLT: CPT

## 2022-06-24 PROCEDURE — 2060000000 HC ICU INTERMEDIATE R&B

## 2022-06-24 PROCEDURE — 80053 COMPREHEN METABOLIC PANEL: CPT

## 2022-06-24 PROCEDURE — 90935 HEMODIALYSIS ONE EVALUATION: CPT

## 2022-06-24 PROCEDURE — 6370000000 HC RX 637 (ALT 250 FOR IP)

## 2022-06-24 PROCEDURE — 83735 ASSAY OF MAGNESIUM: CPT

## 2022-06-24 PROCEDURE — 6370000000 HC RX 637 (ALT 250 FOR IP): Performed by: INTERNAL MEDICINE

## 2022-06-24 PROCEDURE — 36415 COLL VENOUS BLD VENIPUNCTURE: CPT

## 2022-06-24 PROCEDURE — 6360000002 HC RX W HCPCS

## 2022-06-24 PROCEDURE — 2700000000 HC OXYGEN THERAPY PER DAY

## 2022-06-24 PROCEDURE — 2500000003 HC RX 250 WO HCPCS: Performed by: INTERNAL MEDICINE

## 2022-06-24 RX ORDER — HEPARIN SODIUM 1000 [USP'U]/ML
INJECTION, SOLUTION INTRAVENOUS; SUBCUTANEOUS
Status: DISPENSED
Start: 2022-06-24 | End: 2022-06-24

## 2022-06-24 RX ORDER — HEPARIN SODIUM 1000 [USP'U]/ML
3600 INJECTION, SOLUTION INTRAVENOUS; SUBCUTANEOUS PRN
Status: DISCONTINUED | OUTPATIENT
Start: 2022-06-24 | End: 2022-06-28 | Stop reason: HOSPADM

## 2022-06-24 RX ORDER — HEPARIN SODIUM 1000 [USP'U]/ML
1000 INJECTION, SOLUTION INTRAVENOUS; SUBCUTANEOUS PRN
Status: DISCONTINUED | OUTPATIENT
Start: 2022-06-24 | End: 2022-06-28 | Stop reason: HOSPADM

## 2022-06-24 RX ADMIN — CALCIUM ACETATE 667 MG: 667 CAPSULE ORAL at 14:39

## 2022-06-24 RX ADMIN — CLOPIDOGREL BISULFATE 75 MG: 75 TABLET ORAL at 14:39

## 2022-06-24 RX ADMIN — TRAZODONE HYDROCHLORIDE 150 MG: 50 TABLET ORAL at 20:36

## 2022-06-24 RX ADMIN — HEPARIN SODIUM 1000 UNITS: 1000 INJECTION INTRAVENOUS; SUBCUTANEOUS at 11:34

## 2022-06-24 RX ADMIN — APIXABAN 5 MG: 5 TABLET, FILM COATED ORAL at 20:36

## 2022-06-24 RX ADMIN — METOPROLOL SUCCINATE 50 MG: 50 TABLET, EXTENDED RELEASE ORAL at 20:36

## 2022-06-24 RX ADMIN — PRAVASTATIN SODIUM 40 MG: 40 TABLET ORAL at 14:39

## 2022-06-24 RX ADMIN — QUETIAPINE FUMARATE 50 MG: 25 TABLET ORAL at 20:36

## 2022-06-24 RX ADMIN — PANTOPRAZOLE SODIUM 40 MG: 40 TABLET, DELAYED RELEASE ORAL at 06:18

## 2022-06-24 RX ADMIN — TIOTROPIUM BROMIDE AND OLODATEROL 2 PUFF: 3.124; 2.736 SPRAY, METERED RESPIRATORY (INHALATION) at 09:15

## 2022-06-24 RX ADMIN — ISOSORBIDE DINITRATE 20 MG: 20 TABLET ORAL at 14:39

## 2022-06-24 RX ADMIN — OXYCODONE AND ACETAMINOPHEN 1 TABLET: 7.5; 325 TABLET ORAL at 14:39

## 2022-06-24 RX ADMIN — OXYCODONE AND ACETAMINOPHEN 1 TABLET: 7.5; 325 TABLET ORAL at 20:36

## 2022-06-24 RX ADMIN — ISOSORBIDE DINITRATE 20 MG: 20 TABLET ORAL at 20:37

## 2022-06-24 RX ADMIN — EPOETIN ALFA-EPBX 10000 UNITS: 10000 INJECTION, SOLUTION INTRAVENOUS; SUBCUTANEOUS at 11:37

## 2022-06-24 RX ADMIN — DULOXETINE HYDROCHLORIDE 30 MG: 30 CAPSULE, DELAYED RELEASE ORAL at 14:39

## 2022-06-24 ASSESSMENT — PAIN SCALES - GENERAL
PAINLEVEL_OUTOF10: 0
PAINLEVEL_OUTOF10: 9
PAINLEVEL_OUTOF10: 6
PAINLEVEL_OUTOF10: 8

## 2022-06-24 ASSESSMENT — PAIN DESCRIPTION - FREQUENCY: FREQUENCY: CONTINUOUS

## 2022-06-24 ASSESSMENT — PAIN SCALES - WONG BAKER
WONGBAKER_NUMERICALRESPONSE: 0

## 2022-06-24 ASSESSMENT — PAIN - FUNCTIONAL ASSESSMENT: PAIN_FUNCTIONAL_ASSESSMENT: ACTIVITIES ARE NOT PREVENTED

## 2022-06-24 ASSESSMENT — PAIN DESCRIPTION - ONSET: ONSET: ON-GOING

## 2022-06-24 ASSESSMENT — PAIN DESCRIPTION - LOCATION
LOCATION: BACK

## 2022-06-24 ASSESSMENT — PAIN DESCRIPTION - ORIENTATION: ORIENTATION: LOWER

## 2022-06-24 ASSESSMENT — PAIN DESCRIPTION - DESCRIPTORS: DESCRIPTORS: ACHING

## 2022-06-24 ASSESSMENT — PAIN DESCRIPTION - PAIN TYPE: TYPE: CHRONIC PAIN

## 2022-06-24 NOTE — FLOWSHEET NOTE
06/24/22 1017 06/24/22 1350   Treatment   Time On 1025  --    Time Off  --  1349   Treatment Goal 3000  --    Vital Signs   /61 (!) 141/65   Temp 98.7 °F (37.1 °C) 97.9 °F (36.6 °C)   Heart Rate 67 68   Resp 18 18   Weight 251 lb 5.2 oz (114 kg) 244 lb 11.4 oz (111 kg)   Weight Method Actual;Bed scale Bed scale   Percent Weight Change 3.83 -2.63   Dry Weight 241 lb 6.5 oz (109.5 kg) 241 lb 6.5 oz (109.5 kg)   Treatment Initiation   Dialyze Hours 3.5  --    Post-Hemodialysis Assessment   Rinseback Volume (ml)  --  400 ml   Total Liters Processed (l/min)  --  54.4 l/min   Dialyzer Clearance  --  Moderately streaked   Duration of Treatment (minutes)  --  204 minutes   Heparin amount administered during treatment (units)  --  3000 units   Hemodialysis Intake (ml)  --  400 ml   Hemodialysis Output (ml)  --  3369 ml   NET Removed (ml)  --  2969   Tolerated Treatment  --  Good   Treatment time: 3.5 hours minus 6 minutes  Net UF: 3000 ml      Pre Wt: 114kg per bed  post Wt 111 KG per bed  dry wt: 109.5 KG     Access used: LTDC exchanged 6/23/22 for poor fx, fx still poor  Access function: poor with  to 350, Dr. Nancy Strong made aware, lines flushed reversed and pt repositioned without any improvement. Mid tx able to increase BFR to 350 but AP still on high side.     Medications or blood products given: Retacrit 10,000 units IVP     Regular outpatient schedule: [facility]     Summary of response to treatment: Tolerated tx well.        Crit Line:  Pre tx HCT 30.0  Post tx HCT 33.8 Ending profile A. Max BV% tolerated 11.9. Unable to determine refill due to tx ending 6 minutes early due to conductivity issues.     Copy of dialysis treatment record placed in chart, to be scanned into EMR.  Report to Marcie Sam RN

## 2022-06-24 NOTE — CONSULTS
Nutrition Education    RD received consult for CHF diet education. Pt unavailable today d/t dialysis. Pt known to dietitians and has received diet education during previous admissions. Left handout at bedside with RD phone number for pt to call with questions. Will provide education per availability. Will continue to monitor per nutrition standards of care. · Educated on HF nutrition therapy   · Method: Handout  · Contact name and number provided.     [address]  Contact Number: [phone]

## 2022-06-24 NOTE — PROGRESS NOTES
Hospitalist Progress Note      PCP: Agustin Zhao MD    Date of Admission: 6/21/2022    Chief Complaint: Shortness of breath    Hospital Course:   Patient is a 68-year-old male with a past medical history of ESRD on HD, type 2 diabetes, insomnia/anxiety, GERD, CVA, CAD and ischemic cardiomyopathy who presented for shortness of breath. He states that he takes trazodone at home to sleep. However he ran out and was not sleeping well. He slept through his dialysis time on Monday. So therefore he missed HD on Monday. He presented with increased shortness of breath. He denies any fever or chills. He states he has a cough but however this is nonproductive. Subjective:  Patient seen and examined this morning. He states that he still feels like he is short of breath. He was weaned to 2 L. Chest x-ray completed yesterday showed no significant improvement in his interstitial edema. He states that his sleep was mildly improved with the increase in trazodone.     Medications:  Reviewed    Infusion Medications    sodium chloride      dextrose       Scheduled Medications    heparin (porcine)        traZODone  150 mg Oral Nightly    apixaban  5 mg Oral BID    clopidogrel  75 mg Oral Daily    calcium acetate  1 capsule Oral TID WC    docusate sodium  100 mg Oral Daily    DULoxetine  30 mg Oral Daily    isosorbide dinitrate  20 mg Oral TID    linaclotide  145 mcg Oral QAM AC    metoprolol succinate  50 mg Oral BID    pantoprazole  40 mg Oral QAM AC    pravastatin  40 mg Oral Daily    QUEtiapine  50 mg Oral Nightly    tiotropium-olodaterol  2 puff Inhalation Daily    epoetin siddharth-epbx  10,000 Units IntraVENous Q MWF     PRN Meds: heparin (porcine), heparin (porcine), oxyCODONE-acetaminophen, sodium chloride flush, sodium chloride, ondansetron **OR** ondansetron, acetaminophen **OR** acetaminophen, calcium carbonate, melatonin, LORazepam, glucose, dextrose bolus **OR** dextrose bolus, glucagon (rDNA), dextrose    No intake or output data in the 24 hours ending 06/24/22 1318    Physical Exam Performed:    /61   Pulse 67   Temp 98.7 °F (37.1 °C)   Resp 18   Wt 251 lb 5.2 oz (114 kg)   SpO2 100%   BMI 40.56 kg/m²     General appearance: No acute distress, appears stated age and cooperative. HEENT: Conjunctivae/corneas clear. Neck: No jugular venous distention. Respiratory: On BiPAP  Cardiovascular: Regular rate and rhythm without murmurs, rubs or gallops. Abdomen: Soft, non-tender, non-distended with normal bowel sounds. Musculoskeletal: No clubbing, cyanosis or edema bilaterally. Skin: Skin color, texture, turgor normal.  No rashes or lesions. Neurologic:  Neurovascularly intact without any focal sensory/motor deficits. Psychiatric: Alert and oriented, thought content appropriate, normal insight    Labs:   Recent Labs     06/22/22  0459 06/23/22  0504 06/24/22  0529   WBC 6.8 6.0 8.7   HGB 9.5* 9.3* 9.4*   HCT 29.9* 28.9* 29.0*    290 287     Recent Labs     06/22/22  0459 06/23/22  0504 06/24/22  0529   * 132* 127*   K 4.7 4.9 5.1  5.1   CL 94* 94* 90*   CO2 18* 23 22   BUN 40* 32* 41*   CREATININE 6.2* 5.1* 7.3*   CALCIUM 8.7 9.0 9.1   PHOS 5.9*  --   --      Recent Labs     06/23/22  0504 06/24/22  0529   AST 9* 7*   ALT 8* <5*   BILITOT <0.2 <0.2   ALKPHOS 89 86     Recent Labs     06/22/22  1035   INR 1.19*     No results for input(s): CKTOTAL, TROPONINI in the last 72 hours. Urinalysis:      Lab Results   Component Value Date    NITRU Negative 05/29/2022    WBCUA 10-20 05/29/2022    BACTERIA 3+ 05/29/2022    RBCUA 5-10 05/29/2022    BLOODU TRACE-INTACT 05/29/2022    SPECGRAV 1.015 05/29/2022    GLUCOSEU 100 05/29/2022       Radiology:  IR TUNNELED CVC PLACE WO SQ PORT/PUMP > 5 YEARS   Final Result   Successful fluoroscopic guided exchange of a tunneled dialysis catheter and   fibrin sheath disruption. XR CHEST PORTABLE   Final Result   Mild cardiomegaly. Mild-to-moderate congestion and/or infiltrates identified   in the lungs. No significant improvement from recent study. XR CHEST PORTABLE   Final Result   Findings compatible with interstitial edema and small left effusion. Assessment/Plan:    Active Hospital Problems    Diagnosis     Ischemic cardiomyopathy [I25.5]      Priority: High    Type 2 diabetes, uncontrolled, with neuropathy ([facility] [E11.40, E11.65]      Priority: High    Morbid obesity with BMI of 40.0-44.9, adult ([facility]) [E66.01, Z68.41]      Priority: Medium    Asthma-COPD overlap syndrome ([facility] [J44.9]      Priority: Medium    Respiratory failure ([facility] [J96.90]     Respiratory failure with hypoxia ([facility] [J96.91]     ESRD (end stage renal disease) ([facility] [N18.6]      1. Pulmonary edema due to missed dialysis session, POA  - Chest x-ray obtained on admission showed interstitial edema and small left effusion  - Chest x-ray showed no significant improvement from his previous study in interstitial edema  - Hemodialysis on 6/21/2022 with 1.9 L removed, 3 L removed on 6/22/2022, hemodialysis today with goal of 3 L removed    2. ESRD  - HD per nephrology  - HD dialysis catheter was replaced yesterday, 6/23/2022.    3.  Insomnia/anxiety  - Trazodone will be increased to 150 mg  - Continue Ativan as needed  - Continue Cymbalta    4. Type 2 diabetes mellitus  - Hemoglobin A1c on 4/18/2022 was 6.1%, repeat was 6.2%  - No therapy needed    5. GERD  - Continue pantoprazole 40 mg daily    6. PAF  - Currently rate controlled  - Continue metoprolol 50 mg twice daily  - Continue Eliquis 5 mg twice daily    7.   CAD, ischemic cardiomyopathy  - Status post PATRICIA of the RCA on 1/14/2022 and PATRICIA of the LAD in 2015  - Echo completed on 6/3/2022 showed an EF of 20 to 25%  - Continue Plavix 75 mg daily  - Continue metoprolol 50 mg twice daily  - Continue isosorbide dinitrate 20 mg 3 times daily  - Continue pravastatin 40 mg daily      DVT Prophylaxis: Eliquis  Diet: ADULT DIET; Regular; 4 carb choices (60 gm/meal); Low Fat/Low Chol/High Fiber/NIDA; Low Potassium (Less than 3000 mg/day);  Low Phosphorus (Less than 1000 mg)  Code Status: Full Code    PT/OT Eval Status: Not required    Dispo - 1-2 days pending clinical improvement    Cuauhtemoc Montague DO

## 2022-06-24 NOTE — FLOWSHEET NOTE
06/24/22 0852   Vital Signs   Temp 97.5 °F (36.4 °C)   Temp Source Oral   Heart Rate 66   Heart Rate Source Monitor   Resp 16   /74   BP Location Right upper arm   BP Method Automatic   MAP (Calculated) 88.33   Patient Position Semi fowlers   Level of Consciousness Alert (0)   MEWS Score 1   Pain Assessment   Pain Assessment None - Denies Pain   Pain Level 0   Guajardo-Baker Pain Rating 0   Patient's Stated Pain Goal 0 - No pain   Opioid-Induced Sedation   POSS Score 1   RASS   Lamar Agitation Sedation Scale (RASS) 0   Oxygen Therapy   SpO2 98 %   Pulse Oximetry Type Intermittent   Pulse Oximeter Device Mode Intermittent   Pulse Oximeter Device Location Right;Finger   O2 Device Nasal cannula   Skin Assessment Clean, dry, & intact   O2 Flow Rate (L/min) 2 L/min   Patient Observation   Observations Eating breakfast   Patient A/O VSS no distress noted. Patient refused to go to dialysis until he received his breakfast. Dialysis and transport aware. Patient breakfast received.

## 2022-06-24 NOTE — PROGRESS NOTES
[facility] Progress Note           Subjective/   45y.o. year old male who we are seeing in consultation for ESKD on HD. HPI:  Last HD on 6/22 with 3 liters removed, post-weight of 111 kg. Seen on dialysis. Orders confirmed. Pre-weight at HD today 114 kg. Access still running at lower blood flows despite exchange on 6/23. Intake adequate. ROS:  +short of breath, remains on 2 L NC, being weaned, no change with HD, no chest pain.     Objective/   GEN:  Chronically ill, /65   Pulse 61   Temp 97.5 °F (36.4 °C) (Axillary)   Resp 16   Wt 242 lb 1 oz (109.8 kg)   SpO2 99%   BMI 39.07 kg/m²   HEENT: non-icteric, no JVD  CV: S1, S2 without m/r/g; no LE edema  RESP: CTA B without w/r/r; breathing wnl  ABD: +bs, soft, nt, no hsm  SKIN: warm, no rashes  ACCESS: Left IJ TDC (exchanged 6/23)    Data/  Recent Labs     06/22/22  0459 06/23/22  0504 06/24/22  0529   WBC 6.8 6.0 8.7   HGB 9.5* 9.3* 9.4*   HCT 29.9* 28.9* 29.0*   MCV 92.0 90.5 89.9    290 287     Recent Labs     06/22/22  0459 06/23/22  0504 06/24/22  0529   * 132* 127*   K 4.7 4.9 5.1  5.1   CL 94* 94* 90*   CO2 18* 23 22   GLUCOSE 136* 186* 140*   PHOS 5.9*  --   --    MG  --   --  2.10   BUN 40* 32* 41*   CREATININE 6.2* 5.1* 7.3*   LABGLOM 9* 12* 8*   GFRAA 11* 14* 9*       Assessment/     # End stage kidney disease - on HD Mon-Wed-Fri     # sCHF/Ischemic cardiomyopathy - EF worse at ~20-25%      # CAD: s/p PATRICIA RCA 1/14/2022; s/p PATRICIA LAD 2015     # Bicuspid AV/severe AS: s/p TAVR 10/2019     # Hypertension      # Anemia of chronic disease - DAVON with HD     # Hyponatremia - chronic, due to fluid overload, monitor with HD      Plan/     - HD today - 3 L UF target as tolerated, outpatient .5 kg  - TDC exchanged due to malfunction on 6/23, monitor for improved blood flows  - DAVON 10K qHD  - Trend labs, bp's    ____________________________________  Brionna MD Manuel  [facility] [address] [place] [address]. [facility]  Office: [phone]

## 2022-06-24 NOTE — PROGRESS NOTES
06/23/22 2332   NIV Type   NIV Started/Stopped On   Equipment Type v60   Mode Bilevel   Mask Type Full face mask   Mask Size Medium   Settings/Measurements   IPAP 14 cmH20   CPAP/EPAP 8 cmH2O   Rate Ordered 8   Resp 17   FiO2  30 %   Vt Exhaled 610 mL   Mask Leak (lpm) 42 lpm   Comfort Level Good   Using Accessory Muscles No   Alarm Settings   Alarms On Y   Low Pressure 6 cmH2O   High Pressure  30 cmH2O

## 2022-06-24 NOTE — PROGRESS NOTES
06/24/22 0318   NIV Type   $NIV $Daily Charge   NIV Started/Stopped On   Equipment Type v60   Mode Bilevel   Mask Type Full face mask   Mask Size Medium   Settings/Measurements   IPAP 14 cmH20   CPAP/EPAP 8 cmH2O   Rate Ordered 8   Resp 20   FiO2  30 %   Vt Exhaled 687 mL   Mask Leak (lpm) 40 lpm   Comfort Level Good   Using Accessory Muscles No   Breath Sounds   Right Upper Lobe Diminished   Right Middle Lobe Diminished   Right Lower Lobe Diminished   Left Upper Lobe Diminished   Left Lower Lobe Diminished   Alarm Settings   Alarms On Y

## 2022-06-25 LAB
A/G RATIO: 1.7 (ref 1.1–2.2)
ALBUMIN SERPL-MCNC: 3.8 G/DL (ref 3.4–5)
ALP BLD-CCNC: 89 U/L (ref 40–129)
ALT SERPL-CCNC: <5 U/L (ref 10–40)
ANION GAP SERPL CALCULATED.3IONS-SCNC: 14 MMOL/L (ref 3–16)
AST SERPL-CCNC: 7 U/L (ref 15–37)
BASOPHILS ABSOLUTE: 0.1 K/UL (ref 0–0.2)
BASOPHILS RELATIVE PERCENT: 1.2 %
BILIRUB SERPL-MCNC: <0.2 MG/DL (ref 0–1)
BUN BLDV-MCNC: 32 MG/DL (ref 7–20)
CALCIUM SERPL-MCNC: 9.2 MG/DL (ref 8.3–10.6)
CHLORIDE BLD-SCNC: 93 MMOL/L (ref 99–110)
CO2: 24 MMOL/L (ref 21–32)
CREAT SERPL-MCNC: 5.8 MG/DL (ref 0.9–1.3)
EOSINOPHILS ABSOLUTE: 0.4 K/UL (ref 0–0.6)
EOSINOPHILS RELATIVE PERCENT: 6.7 %
GFR AFRICAN AMERICAN: 12
GFR NON-AFRICAN AMERICAN: 10
GLUCOSE BLD-MCNC: 154 MG/DL (ref 70–99)
GLUCOSE BLD-MCNC: 163 MG/DL (ref 70–99)
GLUCOSE BLD-MCNC: 173 MG/DL (ref 70–99)
GLUCOSE BLD-MCNC: 178 MG/DL (ref 70–99)
GLUCOSE BLD-MCNC: 180 MG/DL (ref 70–99)
HCT VFR BLD CALC: 29.1 % (ref 40.5–52.5)
HEMOGLOBIN: 9.4 G/DL (ref 13.5–17.5)
LYMPHOCYTES ABSOLUTE: 0.9 K/UL (ref 1–5.1)
LYMPHOCYTES RELATIVE PERCENT: 15.7 %
MAGNESIUM: 1.9 MG/DL (ref 1.8–2.4)
MCH RBC QN AUTO: 29.8 PG (ref 26–34)
MCHC RBC AUTO-ENTMCNC: 32.4 G/DL (ref 31–36)
MCV RBC AUTO: 91.9 FL (ref 80–100)
MONOCYTES ABSOLUTE: 0.6 K/UL (ref 0–1.3)
MONOCYTES RELATIVE PERCENT: 10.4 %
NEUTROPHILS ABSOLUTE: 3.8 K/UL (ref 1.7–7.7)
NEUTROPHILS RELATIVE PERCENT: 66 %
PDW BLD-RTO: 16 % (ref 12.4–15.4)
PERFORMED ON: ABNORMAL
PLATELET # BLD: 274 K/UL (ref 135–450)
PMV BLD AUTO: 7.7 FL (ref 5–10.5)
POTASSIUM REFLEX MAGNESIUM: 5 MMOL/L (ref 3.5–5.1)
POTASSIUM SERPL-SCNC: 5 MMOL/L (ref 3.5–5.1)
RBC # BLD: 3.16 M/UL (ref 4.2–5.9)
SODIUM BLD-SCNC: 131 MMOL/L (ref 136–145)
TOTAL PROTEIN: 6.1 G/DL (ref 6.4–8.2)
WBC # BLD: 5.8 K/UL (ref 4–11)

## 2022-06-25 PROCEDURE — 2700000000 HC OXYGEN THERAPY PER DAY

## 2022-06-25 PROCEDURE — 2060000000 HC ICU INTERMEDIATE R&B

## 2022-06-25 PROCEDURE — 94761 N-INVAS EAR/PLS OXIMETRY MLT: CPT

## 2022-06-25 PROCEDURE — 36415 COLL VENOUS BLD VENIPUNCTURE: CPT

## 2022-06-25 PROCEDURE — 83735 ASSAY OF MAGNESIUM: CPT

## 2022-06-25 PROCEDURE — 80053 COMPREHEN METABOLIC PANEL: CPT

## 2022-06-25 PROCEDURE — 85025 COMPLETE CBC W/AUTO DIFF WBC: CPT

## 2022-06-25 PROCEDURE — 94660 CPAP INITIATION&MGMT: CPT

## 2022-06-25 PROCEDURE — 6370000000 HC RX 637 (ALT 250 FOR IP): Performed by: INTERNAL MEDICINE

## 2022-06-25 PROCEDURE — 2500000003 HC RX 250 WO HCPCS: Performed by: INTERNAL MEDICINE

## 2022-06-25 PROCEDURE — 6370000000 HC RX 637 (ALT 250 FOR IP)

## 2022-06-25 RX ADMIN — APIXABAN 5 MG: 5 TABLET, FILM COATED ORAL at 20:57

## 2022-06-25 RX ADMIN — CALCIUM ACETATE 667 MG: 667 CAPSULE ORAL at 18:16

## 2022-06-25 RX ADMIN — DULOXETINE HYDROCHLORIDE 30 MG: 30 CAPSULE, DELAYED RELEASE ORAL at 09:54

## 2022-06-25 RX ADMIN — TRAZODONE HYDROCHLORIDE 150 MG: 50 TABLET ORAL at 20:57

## 2022-06-25 RX ADMIN — CALCIUM ACETATE 667 MG: 667 CAPSULE ORAL at 09:55

## 2022-06-25 RX ADMIN — PRAVASTATIN SODIUM 40 MG: 40 TABLET ORAL at 09:55

## 2022-06-25 RX ADMIN — METOPROLOL SUCCINATE 50 MG: 50 TABLET, EXTENDED RELEASE ORAL at 20:57

## 2022-06-25 RX ADMIN — CALCIUM ACETATE 667 MG: 667 CAPSULE ORAL at 13:34

## 2022-06-25 RX ADMIN — CLOPIDOGREL BISULFATE 75 MG: 75 TABLET ORAL at 09:54

## 2022-06-25 RX ADMIN — QUETIAPINE FUMARATE 50 MG: 25 TABLET ORAL at 20:56

## 2022-06-25 RX ADMIN — METOPROLOL SUCCINATE 50 MG: 50 TABLET, EXTENDED RELEASE ORAL at 09:55

## 2022-06-25 RX ADMIN — ISOSORBIDE DINITRATE 20 MG: 20 TABLET ORAL at 13:34

## 2022-06-25 RX ADMIN — ISOSORBIDE DINITRATE 20 MG: 20 TABLET ORAL at 20:57

## 2022-06-25 RX ADMIN — ISOSORBIDE DINITRATE 20 MG: 20 TABLET ORAL at 09:55

## 2022-06-25 RX ADMIN — APIXABAN 5 MG: 5 TABLET, FILM COATED ORAL at 09:55

## 2022-06-25 RX ADMIN — OXYCODONE AND ACETAMINOPHEN 1 TABLET: 7.5; 325 TABLET ORAL at 09:55

## 2022-06-25 RX ADMIN — PANTOPRAZOLE SODIUM 40 MG: 40 TABLET, DELAYED RELEASE ORAL at 06:10

## 2022-06-25 ASSESSMENT — PAIN DESCRIPTION - LOCATION: LOCATION: BACK

## 2022-06-25 ASSESSMENT — PAIN DESCRIPTION - DESCRIPTORS: DESCRIPTORS: ACHING

## 2022-06-25 ASSESSMENT — PAIN SCALES - GENERAL
PAINLEVEL_OUTOF10: 8
PAINLEVEL_OUTOF10: 7

## 2022-06-25 ASSESSMENT — PAIN SCALES - WONG BAKER: WONGBAKER_NUMERICALRESPONSE: 0

## 2022-06-25 ASSESSMENT — PAIN - FUNCTIONAL ASSESSMENT: PAIN_FUNCTIONAL_ASSESSMENT: PREVENTS OR INTERFERES SOME ACTIVE ACTIVITIES AND ADLS

## 2022-06-25 NOTE — PROGRESS NOTES
06/25/22 0350   NIV Type   NIV Started/Stopped On   Equipment Type v60   Mode Bilevel   Mask Type Full face mask   Mask Size Medium   Settings/Measurements   IPAP 14 cmH20   CPAP/EPAP 8 cmH2O   Rate Ordered 14   Resp 15   Insp Rise Time (%) 2 %   FiO2  30 %   I Time/ I Time % 1 s   Vt Exhaled 599 mL   Minute Volume 9 Liters   Mask Leak (lpm) 0 lpm   Comfort Level Good   Using Accessory Muscles No   SpO2 96   Patient's Home Machine No   Breath Sounds   Right Upper Lobe Diminished   Right Middle Lobe Diminished   Right Lower Lobe Diminished   Left Upper Lobe Diminished   Left Lower Lobe Diminished   Alarm Settings   Alarms On Y   Low Pressure 6 cmH2O   High Pressure  30 cmH2O   Delay Alarm 20 sec(s)   RR Low  6   RR High 40 br/min   Oxygen Therapy/Pulse Ox   Heart Rate 67   SpO2 96 %

## 2022-06-25 NOTE — PROGRESS NOTES
06/25/22 0034   NIV Type   $NIV $Daily Charge   Equipment Type V60   Mode Bilevel   Mask Type Full face mask   Mask Size Medium   Settings/Measurements   IPAP 14 cmH20   CPAP/EPAP 8 cmH2O   Resp 17   FiO2  30 %   Vt Exhaled 310 mL   Minute Volume 5 Liters   Mask Leak (lpm) 68 lpm   Comfort Level Good   Using Accessory Muscles No   Patient's Home Machine No

## 2022-06-25 NOTE — PROGRESS NOTES
Hospitalist Progress Note      PCP: Zraa Dove MD    Date of Admission: 6/21/2022    Chief Complaint: Shortness of breath    Hospital Course:   Patient is a 77-year-old male with a past medical history of ESRD on HD, type 2 diabetes, insomnia/anxiety, GERD, CVA, CAD and ischemic cardiomyopathy who presented for shortness of breath. He states that he takes trazodone at home to sleep. However he ran out and was not sleeping well. He slept through his dialysis time on Monday. So therefore he missed HD on Monday. He presented with increased shortness of breath. He denies any fever or chills. He states he has a cough but however this is nonproductive. Subjective:  Patient seen and examined this morning. Resting comfortably in bed on BiPAP. Patient states that he is feeling mildly better. His shortness of breath comes and goes now. He believes it is improved.     Medications:  Reviewed    Infusion Medications    sodium chloride      dextrose       Scheduled Medications    traZODone  150 mg Oral Nightly    apixaban  5 mg Oral BID    clopidogrel  75 mg Oral Daily    calcium acetate  1 capsule Oral TID WC    docusate sodium  100 mg Oral Daily    DULoxetine  30 mg Oral Daily    isosorbide dinitrate  20 mg Oral TID    linaclotide  145 mcg Oral QAM AC    metoprolol succinate  50 mg Oral BID    pantoprazole  40 mg Oral QAM AC    pravastatin  40 mg Oral Daily    QUEtiapine  50 mg Oral Nightly    tiotropium-olodaterol  2 puff Inhalation Daily    epoetin siddharth-epbx  10,000 Units IntraVENous Q MWF     PRN Meds: heparin (porcine), heparin (porcine), oxyCODONE-acetaminophen, sodium chloride flush, sodium chloride, ondansetron **OR** ondansetron, acetaminophen **OR** acetaminophen, calcium carbonate, melatonin, LORazepam, glucose, dextrose bolus **OR** dextrose bolus, glucagon (rDNA), dextrose      Intake/Output Summary (Last 24 hours) at 6/25/2022 1051  Last data filed at 6/24/2022 1834  Gross per 24 hour   Intake 1315 ml   Output 3369 ml   Net -2054 ml       Physical Exam Performed:    BP (!) 150/71   Pulse 69   Temp 97.7 °F (36.5 °C)   Resp 16   Wt 245 lb 2.4 oz (111.2 kg)   SpO2 97%   BMI 39.57 kg/m²     General appearance: No acute distress, appears stated age and cooperative. HEENT: Conjunctivae/corneas clear. Neck: No jugular venous distention. Respiratory: On BiPAP  Cardiovascular: Regular rate and rhythm without murmurs, rubs or gallops. Abdomen: Soft, non-tender, non-distended with normal bowel sounds. Musculoskeletal: No clubbing, cyanosis or edema bilaterally. Skin: Skin color, texture, turgor normal.  No rashes or lesions. Neurologic:  Neurovascularly intact without any focal sensory/motor deficits. Psychiatric: Alert and oriented, thought content appropriate, normal insight    Labs:   Recent Labs     06/23/22  0504 06/24/22  0529 06/25/22  0452   WBC 6.0 8.7 5.8   HGB 9.3* 9.4* 9.4*   HCT 28.9* 29.0* 29.1*    287 274     Recent Labs     06/23/22  0504 06/23/22  0504 06/24/22  0529 06/25/22  0453   *  --  127* 131*   K 4.9   < > 5.1  5.1 5.0  5.0   CL 94*  --  90* 93*   CO2 23  --  22 24   BUN 32*  --  41* 32*   CREATININE 5.1*  --  7.3* 5.8*   CALCIUM 9.0  --  9.1 9.2    < > = values in this interval not displayed. Recent Labs     06/23/22  0504 06/24/22  0529 06/25/22  0453   AST 9* 7* 7*   ALT 8* <5* <5*   BILITOT <0.2 <0.2 <0.2   ALKPHOS 89 86 89     No results for input(s): INR in the last 72 hours. No results for input(s): [place] in the last 72 hours.     Urinalysis:      Lab Results   Component Value Date    NITRU Negative 05/29/2022    WBCUA 10-20 05/29/2022    BACTERIA 3+ 05/29/2022    RBCUA 5-10 05/29/2022    BLOODU TRACE-INTACT 05/29/2022    SPECGRAV 1.015 05/29/2022    GLUCOSEU 100 05/29/2022       Radiology:  IR TUNNELED CVC PLACE WO SQ PORT/PUMP > 5 YEARS   Final Result   Successful fluoroscopic guided exchange of a tunneled dialysis catheter and   fibrin sheath disruption. XR CHEST PORTABLE   Final Result   Mild cardiomegaly. Mild-to-moderate congestion and/or infiltrates identified   in the lungs. No significant improvement from recent study. XR CHEST PORTABLE   Final Result   Findings compatible with interstitial edema and small left effusion. Assessment/Plan:    Active Hospital Problems    Diagnosis     Ischemic cardiomyopathy [I25.5]      Priority: High    Type 2 diabetes, uncontrolled, with neuropathy ([facility] [E11.40, E11.65]      Priority: High    Morbid obesity with BMI of 40.0-44.9, adult (HCC) [E66.01, Z68.41]      Priority: Medium    Asthma-COPD overlap syndrome ([facility] [J44.9]      Priority: Medium    Respiratory failure ([facility] [J96.90]     Respiratory failure with hypoxia ([facility] [J96.91]     ESRD (end stage renal disease) ([facility] [N18.6]      1. Pulmonary edema due to missed dialysis session, POA  - Chest x-ray obtained on admission showed interstitial edema and small left effusion  - Chest x-ray showed no significant improvement from his previous study in interstitial edema  - Hemodialysis on 6/21/2022 with 1.9 L removed, 3 L removed on 6/22/2022, 3 L removed on 6/24/2022    2. ESRD  - HD per nephrology  - HD dialysis catheter was replaced yesterday, 6/23/2022. May need another HD catheter replacement due to not functioning properly. 3.  Insomnia/anxiety  - Trazodone will be increased to 150 mg  - Continue Ativan as needed  - Continue Cymbalta    4. Type 2 diabetes mellitus  - Hemoglobin A1c on 4/18/2022 was 6.1%, repeat was 6.2%  - No therapy needed    5. GERD  - Continue pantoprazole 40 mg daily    6. PAF  - Currently rate controlled  - Continue metoprolol 50 mg twice daily  - Continue Eliquis 5 mg twice daily    7.   CAD, ischemic cardiomyopathy  - Status post PATRICIA of the RCA on 1/14/2022 and PATRICIA of the LAD in 2015  - Echo completed on 6/3/2022 showed an EF of 20 to 25%  - Continue Plavix 75 mg daily  - Continue metoprolol 50 mg twice daily  - Continue isosorbide dinitrate 20 mg 3 times daily  - Continue pravastatin 40 mg daily      DVT Prophylaxis: Eliquis  Diet: ADULT DIET; Regular; 4 carb choices (60 gm/meal); Low Fat/Low Chol/High Fiber/NIDA; Low Potassium (Less than 3000 mg/day);  Low Phosphorus (Less than 1000 mg)  Code Status: Full Code    PT/OT Eval Status: Not required    Dispo - 1-2 days pending clinical improvement    Karishma Gao DO

## 2022-06-25 NOTE — PROGRESS NOTES
06/25/22 0814   NIV Type   NIV Started/Stopped On   Equipment Type V60   Mode Bilevel   Mask Type Full face mask   Mask Size Medium   Settings/Measurements   IPAP 14 cmH20   CPAP/EPAP 8 cmH2O   Rate Ordered 14   Resp 15   FiO2  30 %   Vt Exhaled 461 mL   Minute Volume 7 Liters   Mask Leak (lpm) 27 lpm   Comfort Level Good   Using Accessory Muscles No   SpO2 95   Alarm Settings   Alarms On Y   Low Pressure 6 cmH2O   High Pressure  30 cmH2O   Apnea (secs) 20 secs   RR Low  6   RR High 40 br/min   Oxygen Therapy/Pulse Ox   O2 Therapy Oxygen   $Oxygen $Daily Charge   O2 Device PAP (positive airway pressure)   Heart Rate 66   SpO2 95 %   $Pulse Oximeter $Spot check (multiple/continuous)

## 2022-06-25 NOTE — PROGRESS NOTES
[facility] Progress Note           Subjective/   45y.o. year old male who we are seeing in consultation for ESKD on HD. HPI:  Denies any new complaints    HD on 6/24 with 3 L UF, with decreased blood flow of around 325 to 350 mL/min despite reversing the ports. Had increased arterial pressure    HD on 6/22 with 3 liters removed, post-weight of 111 kg. Access still running at lower blood flows despite exchange on 6/23. Intake adequate. ROS:  +short of breath, remains on 2 L NC, being weaned, no change with HD, no chest pain. Objective/   GEN:  Chronically ill, BP (!) 110/53   Pulse 60   Temp 97.6 °F (36.4 °C)   Resp 16   Wt 245 lb 2.4 oz (111.2 kg)   SpO2 99%   BMI 39.57 kg/m²   HEENT: non-icteric, no JVD  CV: S1, S2 without m/r/g; no LE edema  RESP: CTA B without w/r/r; breathing wnl  ABD: +bs, soft, nt, no hsm  SKIN: warm, no rashes  ACCESS: Left IJ TDC (exchanged 6/23)    Data/  Recent Labs     06/23/22  0504 06/24/22  0529 06/25/22  0452   WBC 6.0 8.7 5.8   HGB 9.3* 9.4* 9.4*   HCT 28.9* 29.0* 29.1*   MCV 90.5 89.9 91.9    287 274     Recent Labs     06/23/22  0504 06/23/22  0504 06/24/22  0529 06/25/22  0453   *  --  127* 131*   K 4.9   < > 5.1  5.1 5.0  5.0   CL 94*  --  90* 93*   CO2 23  --  22 24   GLUCOSE 186*  --  140* 173*   MG  --   --  2.10 1.90   BUN 32*  --  41* 32*   CREATININE 5.1*  --  7.3* 5.8*   LABGLOM 12*  --  8* 10*   GFRAA 14*  --  9* 12*    < > = values in this interval not displayed.        Assessment/     # End stage kidney disease - on HD Mon-Wed-Fri     # sCHF/Ischemic cardiomyopathy - EF worse at ~20-25%      # CAD: s/p PATRICIA RCA 1/14/2022; s/p PATRICIA LAD 2015     # Bicuspid AV/severe AS: s/p TAVR 10/2019     # Hypertension      # Anemia of chronic disease - DAVON with HD     # Hyponatremia - chronic, due to fluid overload, monitor with HD      Plan/     - HD per Monday Wednesday Friday schedule, next on 6/27    outpatient .5 kg  - TDC exchanged due to malfunction on 6/23, monitor for improved blood flows   Will try on 6/27 before deciding on another catheter exchange  - DAVON 10K qHD  - Trend labs, bp's    ____________________________________  Stefanie Meraz MD  [facility]  [email or web address]  Office: [phone]

## 2022-06-26 LAB
A/G RATIO: 1.9 (ref 1.1–2.2)
ALBUMIN SERPL-MCNC: 3.7 G/DL (ref 3.4–5)
ALP BLD-CCNC: 83 U/L (ref 40–129)
ALT SERPL-CCNC: <5 U/L (ref 10–40)
ANION GAP SERPL CALCULATED.3IONS-SCNC: 14 MMOL/L (ref 3–16)
AST SERPL-CCNC: 8 U/L (ref 15–37)
BASOPHILS ABSOLUTE: 0.1 K/UL (ref 0–0.2)
BASOPHILS RELATIVE PERCENT: 1.3 %
BILIRUB SERPL-MCNC: <0.2 MG/DL (ref 0–1)
BUN BLDV-MCNC: 46 MG/DL (ref 7–20)
CALCIUM SERPL-MCNC: 9.2 MG/DL (ref 8.3–10.6)
CHLORIDE BLD-SCNC: 92 MMOL/L (ref 99–110)
CO2: 24 MMOL/L (ref 21–32)
CREAT SERPL-MCNC: 7.3 MG/DL (ref 0.9–1.3)
EOSINOPHILS ABSOLUTE: 0.5 K/UL (ref 0–0.6)
EOSINOPHILS RELATIVE PERCENT: 6.3 %
GFR AFRICAN AMERICAN: 9
GFR NON-AFRICAN AMERICAN: 8
GLUCOSE BLD-MCNC: 131 MG/DL (ref 70–99)
GLUCOSE BLD-MCNC: 152 MG/DL (ref 70–99)
GLUCOSE BLD-MCNC: 157 MG/DL (ref 70–99)
GLUCOSE BLD-MCNC: 169 MG/DL (ref 70–99)
GLUCOSE BLD-MCNC: 195 MG/DL (ref 70–99)
HCT VFR BLD CALC: 28.8 % (ref 40.5–52.5)
HEMOGLOBIN: 9.4 G/DL (ref 13.5–17.5)
LYMPHOCYTES ABSOLUTE: 0.9 K/UL (ref 1–5.1)
LYMPHOCYTES RELATIVE PERCENT: 12.1 %
MAGNESIUM: 2 MG/DL (ref 1.8–2.4)
MCH RBC QN AUTO: 29.8 PG (ref 26–34)
MCHC RBC AUTO-ENTMCNC: 32.7 G/DL (ref 31–36)
MCV RBC AUTO: 91.2 FL (ref 80–100)
MONOCYTES ABSOLUTE: 0.7 K/UL (ref 0–1.3)
MONOCYTES RELATIVE PERCENT: 9 %
NEUTROPHILS ABSOLUTE: 5.3 K/UL (ref 1.7–7.7)
NEUTROPHILS RELATIVE PERCENT: 71.3 %
PDW BLD-RTO: 15.8 % (ref 12.4–15.4)
PERFORMED ON: ABNORMAL
PLATELET # BLD: 285 K/UL (ref 135–450)
PMV BLD AUTO: 7.7 FL (ref 5–10.5)
POTASSIUM REFLEX MAGNESIUM: 4.7 MMOL/L (ref 3.5–5.1)
POTASSIUM SERPL-SCNC: 4.7 MMOL/L (ref 3.5–5.1)
PRO-BNP: ABNORMAL PG/ML (ref 0–124)
RBC # BLD: 3.16 M/UL (ref 4.2–5.9)
SODIUM BLD-SCNC: 130 MMOL/L (ref 136–145)
TOTAL PROTEIN: 5.7 G/DL (ref 6.4–8.2)
WBC # BLD: 7.4 K/UL (ref 4–11)

## 2022-06-26 PROCEDURE — 85025 COMPLETE CBC W/AUTO DIFF WBC: CPT

## 2022-06-26 PROCEDURE — 2580000003 HC RX 258: Performed by: INTERNAL MEDICINE

## 2022-06-26 PROCEDURE — 36415 COLL VENOUS BLD VENIPUNCTURE: CPT

## 2022-06-26 PROCEDURE — 2060000000 HC ICU INTERMEDIATE R&B

## 2022-06-26 PROCEDURE — 94640 AIRWAY INHALATION TREATMENT: CPT

## 2022-06-26 PROCEDURE — 83880 ASSAY OF NATRIURETIC PEPTIDE: CPT

## 2022-06-26 PROCEDURE — 94761 N-INVAS EAR/PLS OXIMETRY MLT: CPT

## 2022-06-26 PROCEDURE — 83735 ASSAY OF MAGNESIUM: CPT

## 2022-06-26 PROCEDURE — 2700000000 HC OXYGEN THERAPY PER DAY

## 2022-06-26 PROCEDURE — 94660 CPAP INITIATION&MGMT: CPT

## 2022-06-26 PROCEDURE — 2500000003 HC RX 250 WO HCPCS: Performed by: INTERNAL MEDICINE

## 2022-06-26 PROCEDURE — 80053 COMPREHEN METABOLIC PANEL: CPT

## 2022-06-26 PROCEDURE — 6370000000 HC RX 637 (ALT 250 FOR IP): Performed by: INTERNAL MEDICINE

## 2022-06-26 PROCEDURE — 6370000000 HC RX 637 (ALT 250 FOR IP)

## 2022-06-26 RX ADMIN — PANTOPRAZOLE SODIUM 40 MG: 40 TABLET, DELAYED RELEASE ORAL at 06:32

## 2022-06-26 RX ADMIN — APIXABAN 5 MG: 5 TABLET, FILM COATED ORAL at 10:48

## 2022-06-26 RX ADMIN — CALCIUM ACETATE 667 MG: 667 CAPSULE ORAL at 10:48

## 2022-06-26 RX ADMIN — CALCIUM ACETATE 667 MG: 667 CAPSULE ORAL at 17:58

## 2022-06-26 RX ADMIN — ISOSORBIDE DINITRATE 20 MG: 20 TABLET ORAL at 14:37

## 2022-06-26 RX ADMIN — Medication 10 ML: at 19:59

## 2022-06-26 RX ADMIN — DULOXETINE HYDROCHLORIDE 30 MG: 30 CAPSULE, DELAYED RELEASE ORAL at 10:48

## 2022-06-26 RX ADMIN — METOPROLOL SUCCINATE 50 MG: 50 TABLET, EXTENDED RELEASE ORAL at 10:48

## 2022-06-26 RX ADMIN — CLOPIDOGREL BISULFATE 75 MG: 75 TABLET ORAL at 10:48

## 2022-06-26 RX ADMIN — CALCIUM ACETATE 667 MG: 667 CAPSULE ORAL at 12:30

## 2022-06-26 RX ADMIN — METOPROLOL SUCCINATE 50 MG: 50 TABLET, EXTENDED RELEASE ORAL at 19:59

## 2022-06-26 RX ADMIN — TRAZODONE HYDROCHLORIDE 150 MG: 50 TABLET ORAL at 19:59

## 2022-06-26 RX ADMIN — ISOSORBIDE DINITRATE 20 MG: 20 TABLET ORAL at 19:59

## 2022-06-26 RX ADMIN — PRAVASTATIN SODIUM 40 MG: 40 TABLET ORAL at 10:48

## 2022-06-26 RX ADMIN — ISOSORBIDE DINITRATE 20 MG: 20 TABLET ORAL at 10:48

## 2022-06-26 RX ADMIN — TIOTROPIUM BROMIDE AND OLODATEROL 2 PUFF: 3.124; 2.736 SPRAY, METERED RESPIRATORY (INHALATION) at 11:47

## 2022-06-26 RX ADMIN — QUETIAPINE FUMARATE 50 MG: 25 TABLET ORAL at 19:58

## 2022-06-26 NOTE — PROGRESS NOTES
06/26/22 0012   NIV Type   NIV Started/Stopped On   Equipment Type V60   Mode Bilevel   Mask Type Full face mask   Mask Size Medium   Settings/Measurements   IPAP 14 cmH20   CPAP/EPAP 8 cmH2O   Rate Ordered 14   Resp 17   FiO2  30 %   Vt Exhaled 362 mL   Minute Volume 6 Liters   Mask Leak (lpm) 14 lpm   Comfort Level Good   Using Accessory Muscles No   SpO2 94   Patient's Home Machine No   Breath Sounds   Right Upper Lobe Diminished   Right Middle Lobe Diminished   Right Lower Lobe Diminished   Left Upper Lobe Diminished   Left Lower Lobe Diminished   Alarm Settings   Alarms On Y

## 2022-06-26 NOTE — PROGRESS NOTES
06/26/22 0425   NIV Type   Equipment Type V60   Mode Bilevel   Mask Type Full face mask   Mask Size Medium   Settings/Measurements   IPAP 14 cmH20   CPAP/EPAP 8 cmH2O   Rate Ordered 14   Resp 16   FiO2  30 %   Vt Exhaled 459 mL   Minute Volume 7.1 Liters   Mask Leak (lpm) 24 lpm   Comfort Level Good   Using Accessory Muscles No   SpO2 95   Patient's Home Machine No   Breath Sounds   Right Upper Lobe Diminished   Right Middle Lobe Diminished   Right Lower Lobe Diminished   Left Upper Lobe Diminished   Left Lower Lobe Diminished   Alarm Settings   Alarms On Y

## 2022-06-26 NOTE — PROGRESS NOTES
[facility] Progress Note           Subjective/   45y.o. year old male who we are seeing in consultation for ESKD on HD. HPI:  resting    HD on 6/24 with 3 L UF, with decreased blood flow of around 325 to 350 mL/min despite reversing the ports. Had increased arterial pressure  HD on 6/22 with 3 liters removed, post-weight of 111 kg. Access still running at lower blood flows despite exchange on 6/23. Intake adequate. ROS:  +short of breath, remains on 2 L NC, being weaned, no change with HD, no chest pain. Objective/   GEN:  Chronically ill, BP (!) 157/83   Pulse 62   Temp 97.8 °F (36.6 °C)   Resp 18   Wt 245 lb 2.4 oz (111.2 kg)   SpO2 95%   BMI 39.57 kg/m²   HEENT: non-icteric, no JVD  CV: S1, S2 without m/r/g; no LE edema  RESP: CTA B without w/r/r; breathing wnl  ABD: +bs, soft, nt, no hsm  SKIN: warm, no rashes  ACCESS: Left IJ TDC (exchanged 6/23)    Data/  Recent Labs     06/24/22  0529 06/25/22  0452 06/26/22  0510   WBC 8.7 5.8 7.4   HGB 9.4* 9.4* 9.4*   HCT 29.0* 29.1* 28.8*   MCV 89.9 91.9 91.2    274 285     Recent Labs     06/24/22  0529 06/24/22  0529 06/25/22  0453 06/26/22  0510   *  --  131* 130*   K 5.1  5.1   < > 5.0  5.0 4.7  4.7   CL 90*  --  93* 92*   CO2 22  --  24 24   GLUCOSE 140*  --  173* 157*   MG 2.10  --  1.90 2.00   BUN 41*  --  32* 46*   CREATININE 7.3*  --  5.8* 7.3*   LABGLOM 8*  --  10* 8*   GFRAA 9*  --  12* 9*    < > = values in this interval not displayed.        Assessment/     # End stage kidney disease - on HD Mon-Wed-Fri     # sCHF/Ischemic cardiomyopathy - EF worse at ~20-25%      # CAD: s/p PATRICIA RCA 1/14/2022; s/p PATRICIA LAD 2015     # Bicuspid AV/severe AS: s/p TAVR 10/2019     # Hypertension      # Anemia of chronic disease - DAVON with HD     # Hyponatremia - chronic, due to fluid overload, monitor with HD      Plan/     - HD per Monday Wednesday Friday schedule, next on 6/27    outpatient .5 kg  - TDC exchanged due to malfunction on 6/23, monitor for improved blood flows   Will try on 6/27 before deciding on another catheter exchange  - DAVON 10K qHD  - Trend labs, bp's    ____________________________________  Anabell Child MD  [facility]  [email or web address]  Office: [phone]

## 2022-06-26 NOTE — PROGRESS NOTES
Hospitalist Progress Note      PCP: Joe Hyde MD    Date of Admission: 6/21/2022    Chief Complaint:   Shortness of breath    Hospital Course:      45 y. o. male who presented to [facility] with shortness of breath. Patient takes trazodone at home to sleep. He says he ran out and was not sleeping well. He slept through his dialysis time on Monday. He missed HD on Monday. Patient with increased shortness of breath. No fever or chills. Cough nonproductive. Patient on bipap.      Subjective:   Patient states he is breathing better. Denies any pain. No nausea vomiting. Medications:  Reviewed    Infusion Medications    sodium chloride      dextrose       Scheduled Medications    traZODone  150 mg Oral Nightly    apixaban  5 mg Oral BID    clopidogrel  75 mg Oral Daily    calcium acetate  1 capsule Oral TID WC    docusate sodium  100 mg Oral Daily    DULoxetine  30 mg Oral Daily    isosorbide dinitrate  20 mg Oral TID    linaclotide  145 mcg Oral QAM AC    metoprolol succinate  50 mg Oral BID    pantoprazole  40 mg Oral QAM AC    pravastatin  40 mg Oral Daily    QUEtiapine  50 mg Oral Nightly    tiotropium-olodaterol  2 puff Inhalation Daily    epoetin siddharth-epbx  10,000 Units IntraVENous Q MWF     PRN Meds: heparin (porcine), heparin (porcine), oxyCODONE-acetaminophen, sodium chloride flush, sodium chloride, ondansetron **OR** ondansetron, acetaminophen **OR** acetaminophen, calcium carbonate, melatonin, LORazepam, glucose, dextrose bolus **OR** dextrose bolus, glucagon (rDNA), dextrose    No intake or output data in the 24 hours ending 06/26/22 1339    Physical Exam Performed:    BP (!) 157/83   Pulse 62   Temp 97.8 °F (36.6 °C)   Resp 18   Wt 245 lb 2.4 oz (111.2 kg)   SpO2 95%   BMI 39.57 kg/m²     General appearance: No apparent distress, appears stated age and cooperative. HEENT: Pupils equal, round, and reactive to light. Conjunctivae/corneas clear.   Neck: Supple, with full range of motion. No jugular venous distention. Trachea midline. Respiratory:  Normal respiratory effort. Clear to auscultation, bilaterally without Rales/Wheezes/Rhonchi. Distant breath sounds. Patient on BiPAP. Cardiovascular: Regular rate and rhythm with normal S1/S2 without murmurs, rubs or gallops. Abdomen: Soft, non-tender, non-distended with normal bowel sounds. Musculoskeletal: No clubbing, cyanosis or edema bilaterally. Full range of motion without deformity. Skin: Skin color, texture, turgor normal.  No rashes or lesions. TDC left upper chest  Neurologic:  Neurovascularly intact without any focal sensory/motor deficits. Cranial nerves: II-XII intact, grossly non-focal.  Psychiatric: Alert and oriented, thought content appropriate, normal insight  Capillary Refill: Brisk,3 seconds, normal   Peripheral Pulses: +2 palpable, equal bilaterally       Labs:   Recent Labs     06/24/22  0529 06/25/22 0452 06/26/22  0510   WBC 8.7 5.8 7.4   HGB 9.4* 9.4* 9.4*   HCT 29.0* 29.1* 28.8*    274 285     Recent Labs     06/24/22  0529 06/24/22  0529 06/25/22  0453 06/26/22  0510   *  --  131* 130*   K 5.1  5.1   < > 5.0  5.0 4.7  4.7   CL 90*  --  93* 92*   CO2 22  --  24 24   BUN 41*  --  32* 46*   CREATININE 7.3*  --  5.8* 7.3*   CALCIUM 9.1  --  9.2 9.2    < > = values in this interval not displayed. Recent Labs     06/24/22  0529 06/25/22  0453 06/26/22  0510   AST 7* 7* 8*   ALT <5* <5* <5*   BILITOT <0.2 <0.2 <0.2   ALKPHOS 86 89 83     No results for input(s): INR in the last 72 hours. No results for input(s): [place]len[place] Sid in the last 72 hours.     Urinalysis:      Lab Results   Component Value Date    NITRU Negative 05/29/2022    WBCUA 10-20 05/29/2022    BACTERIA 3+ 05/29/2022    RBCUA 5-10 05/29/2022    BLOODU TRACE-INTACT 05/29/2022    SPECGRAV 1.015 05/29/2022    GLUCOSEU 100 05/29/2022       Radiology:  IR TUNNELED CVC PLACE WO SQ PORT/PUMP > 5 YEARS   Final Result Successful fluoroscopic guided exchange of a tunneled dialysis catheter and   fibrin sheath disruption. XR CHEST PORTABLE   Final Result   Mild cardiomegaly. Mild-to-moderate congestion and/or infiltrates identified   in the lungs. No significant improvement from recent study. XR CHEST PORTABLE   Final Result   Findings compatible with interstitial edema and small left effusion. Assessment/Plan:    Active Hospital Problems    Diagnosis     Ischemic cardiomyopathy [I25.5]      Priority: High    Type 2 diabetes, uncontrolled, with neuropathy ([facility] [E11.40, E11.65]      Priority: High    Morbid obesity with BMI of 40.0-44.9, adult ([facility]) [E66.01, Z68.41]      Priority: Medium    Asthma-COPD overlap syndrome ([facility] [J44.9]      Priority: Medium    Respiratory failure ([facility] [J96.90]     Respiratory failure with hypoxia ([facility] [J96.91]     ESRD (end stage renal disease) ([facility] [N18.6]      1. Pulm edema due to missed dialysis. Continue HD per nephrology. Monitor UOP. Wean O2.  2. ESRD - Continue HD per nephrology. Renal to evaluate TDC dysfunction. Patient may need replacement of TDC  3. Insomnia/Anxiety - Continue Trazodone and Ativan ordered prn. Continue Cymbalta. 4. DMII - Monitor BS and correct with bolus insulin per sliding scale.      DVT Prophylaxis: Eliquis  Diet: ADULT DIET; Regular; 4 carb choices (60 gm/meal); Low Fat/Low Chol/High Fiber/NIDA; Low Potassium (Less than 3000 mg/day); Low Phosphorus (Less than 1000 mg)  Code Status: Full Code    PT/OT Eval Status: Pending    Dispo -home when stable. Possibly tomorrow if catheter working and patient stable.     Marquis Trista MD

## 2022-06-26 NOTE — PROGRESS NOTES
Dr Rosalva Morales stated to hold eliquis tonight and tomorrow. Hold plavix tomorrow, for possible tunneled vascath change out/replacement.

## 2022-06-26 NOTE — PLAN OF CARE
Problem: Discharge Planning  Goal: Discharge to home or other facility with appropriate resources  Outcome: Progressing     Problem: Pain  Goal: Verbalizes/displays adequate comfort level or baseline comfort level  Outcome: Progressing     Problem: Chronic Conditions and Co-morbidities  Goal: Patient's chronic conditions and co-morbidity symptoms are monitored and maintained or improved  Outcome: Progressing     Problem: Safety - Adult  Goal: Free from fall injury  Outcome: Progressing

## 2022-06-27 ENCOUNTER — CLINICAL DOCUMENTATION (OUTPATIENT)
Dept: CASE MANAGEMENT | Age: 52
End: 2022-06-27

## 2022-06-27 LAB
A/G RATIO: 1.9 (ref 1.1–2.2)
ALBUMIN SERPL-MCNC: 3.8 G/DL (ref 3.4–5)
ALP BLD-CCNC: 90 U/L (ref 40–129)
ALT SERPL-CCNC: <5 U/L (ref 10–40)
ANION GAP SERPL CALCULATED.3IONS-SCNC: 16 MMOL/L (ref 3–16)
AST SERPL-CCNC: 12 U/L (ref 15–37)
BASOPHILS ABSOLUTE: 0.1 K/UL (ref 0–0.2)
BASOPHILS RELATIVE PERCENT: 1.3 %
BILIRUB SERPL-MCNC: <0.2 MG/DL (ref 0–1)
BUN BLDV-MCNC: 61 MG/DL (ref 7–20)
CALCIUM SERPL-MCNC: 9 MG/DL (ref 8.3–10.6)
CHLORIDE BLD-SCNC: 88 MMOL/L (ref 99–110)
CO2: 21 MMOL/L (ref 21–32)
CREAT SERPL-MCNC: 8.6 MG/DL (ref 0.9–1.3)
EOSINOPHILS ABSOLUTE: 0.4 K/UL (ref 0–0.6)
EOSINOPHILS RELATIVE PERCENT: 5.8 %
GFR AFRICAN AMERICAN: 8
GFR NON-AFRICAN AMERICAN: 6
GLUCOSE BLD-MCNC: 148 MG/DL (ref 70–99)
GLUCOSE BLD-MCNC: 158 MG/DL (ref 70–99)
GLUCOSE BLD-MCNC: 163 MG/DL (ref 70–99)
GLUCOSE BLD-MCNC: 188 MG/DL (ref 70–99)
HCT VFR BLD CALC: 27.1 % (ref 40.5–52.5)
HEMOGLOBIN: 9 G/DL (ref 13.5–17.5)
LYMPHOCYTES ABSOLUTE: 1 K/UL (ref 1–5.1)
LYMPHOCYTES RELATIVE PERCENT: 13.4 %
MAGNESIUM: 1.9 MG/DL (ref 1.8–2.4)
MCH RBC QN AUTO: 29.9 PG (ref 26–34)
MCHC RBC AUTO-ENTMCNC: 33.2 G/DL (ref 31–36)
MCV RBC AUTO: 90.2 FL (ref 80–100)
MONOCYTES ABSOLUTE: 0.7 K/UL (ref 0–1.3)
MONOCYTES RELATIVE PERCENT: 9 %
NEUTROPHILS ABSOLUTE: 5.2 K/UL (ref 1.7–7.7)
NEUTROPHILS RELATIVE PERCENT: 70.5 %
PDW BLD-RTO: 16 % (ref 12.4–15.4)
PERFORMED ON: ABNORMAL
PLATELET # BLD: 281 K/UL (ref 135–450)
PMV BLD AUTO: 8 FL (ref 5–10.5)
POTASSIUM REFLEX MAGNESIUM: 5.3 MMOL/L (ref 3.5–5.1)
POTASSIUM SERPL-SCNC: 5.3 MMOL/L (ref 3.5–5.1)
RBC # BLD: 3.01 M/UL (ref 4.2–5.9)
SODIUM BLD-SCNC: 125 MMOL/L (ref 136–145)
TOTAL PROTEIN: 5.8 G/DL (ref 6.4–8.2)
WBC # BLD: 7.5 K/UL (ref 4–11)

## 2022-06-27 PROCEDURE — 6370000000 HC RX 637 (ALT 250 FOR IP)

## 2022-06-27 PROCEDURE — 36415 COLL VENOUS BLD VENIPUNCTURE: CPT

## 2022-06-27 PROCEDURE — 94660 CPAP INITIATION&MGMT: CPT

## 2022-06-27 PROCEDURE — 2060000000 HC ICU INTERMEDIATE R&B

## 2022-06-27 PROCEDURE — 90935 HEMODIALYSIS ONE EVALUATION: CPT

## 2022-06-27 PROCEDURE — 83735 ASSAY OF MAGNESIUM: CPT

## 2022-06-27 PROCEDURE — 2500000003 HC RX 250 WO HCPCS: Performed by: INTERNAL MEDICINE

## 2022-06-27 PROCEDURE — 6370000000 HC RX 637 (ALT 250 FOR IP): Performed by: INTERNAL MEDICINE

## 2022-06-27 PROCEDURE — 2700000000 HC OXYGEN THERAPY PER DAY

## 2022-06-27 PROCEDURE — 2580000003 HC RX 258: Performed by: INTERNAL MEDICINE

## 2022-06-27 PROCEDURE — 6360000002 HC RX W HCPCS

## 2022-06-27 PROCEDURE — 85025 COMPLETE CBC W/AUTO DIFF WBC: CPT

## 2022-06-27 PROCEDURE — 80053 COMPREHEN METABOLIC PANEL: CPT

## 2022-06-27 PROCEDURE — 94761 N-INVAS EAR/PLS OXIMETRY MLT: CPT

## 2022-06-27 RX ORDER — CHOLECALCIFEROL (VITAMIN D3) 10 MCG
1 TABLET ORAL DAILY
Status: DISCONTINUED | OUTPATIENT
Start: 2022-06-27 | End: 2022-06-28 | Stop reason: HOSPADM

## 2022-06-27 RX ORDER — HEPARIN SODIUM 1000 [USP'U]/ML
INJECTION, SOLUTION INTRAVENOUS; SUBCUTANEOUS
Status: COMPLETED
Start: 2022-06-27 | End: 2022-06-27

## 2022-06-27 RX ADMIN — ISOSORBIDE DINITRATE 20 MG: 20 TABLET ORAL at 21:18

## 2022-06-27 RX ADMIN — METOPROLOL SUCCINATE 50 MG: 50 TABLET, EXTENDED RELEASE ORAL at 21:18

## 2022-06-27 RX ADMIN — APIXABAN 5 MG: 5 TABLET, FILM COATED ORAL at 21:18

## 2022-06-27 RX ADMIN — QUETIAPINE FUMARATE 50 MG: 25 TABLET ORAL at 21:18

## 2022-06-27 RX ADMIN — TRAZODONE HYDROCHLORIDE 150 MG: 50 TABLET ORAL at 21:18

## 2022-06-27 RX ADMIN — ISOSORBIDE DINITRATE 20 MG: 20 TABLET ORAL at 14:44

## 2022-06-27 RX ADMIN — HEPARIN SODIUM 3600 UNITS: 1000 INJECTION, SOLUTION INTRAVENOUS; SUBCUTANEOUS at 10:32

## 2022-06-27 RX ADMIN — PANTOPRAZOLE SODIUM 40 MG: 40 TABLET, DELAYED RELEASE ORAL at 06:19

## 2022-06-27 RX ADMIN — DULOXETINE HYDROCHLORIDE 30 MG: 30 CAPSULE, DELAYED RELEASE ORAL at 14:44

## 2022-06-27 RX ADMIN — CALCIUM ACETATE 667 MG: 667 CAPSULE ORAL at 14:44

## 2022-06-27 RX ADMIN — NEPHROCAP 1 MG: 1 CAP ORAL at 14:43

## 2022-06-27 RX ADMIN — Medication 10 ML: at 21:19

## 2022-06-27 RX ADMIN — EPOETIN ALFA-EPBX 10000 UNITS: 10000 INJECTION, SOLUTION INTRAVENOUS; SUBCUTANEOUS at 10:41

## 2022-06-27 RX ADMIN — CALCIUM ACETATE 667 MG: 667 CAPSULE ORAL at 17:00

## 2022-06-27 RX ADMIN — PRAVASTATIN SODIUM 40 MG: 40 TABLET ORAL at 14:44

## 2022-06-27 RX ADMIN — METOPROLOL SUCCINATE 50 MG: 50 TABLET, EXTENDED RELEASE ORAL at 14:44

## 2022-06-27 RX ADMIN — HEPARIN SODIUM 3600 UNITS: 1000 INJECTION INTRAVENOUS; SUBCUTANEOUS at 10:32

## 2022-06-27 RX ADMIN — DOCUSATE SODIUM 100 MG: 100 CAPSULE, LIQUID FILLED ORAL at 14:43

## 2022-06-27 ASSESSMENT — PAIN SCALES - GENERAL
PAINLEVEL_OUTOF10: 0
PAINLEVEL_OUTOF10: 0

## 2022-06-27 NOTE — PROGRESS NOTES
06/26/22 2326   NIV Type   NIV Started/Stopped On   Equipment Type v60   Mode Bilevel   Mask Type Full face mask   Mask Size Medium   Settings/Measurements   IPAP 14 cmH20   CPAP/EPAP 8 cmH2O   Rate Ordered 14   Resp 18   FiO2  30 %   Vt Exhaled 593 mL   Mask Leak (lpm) 42 lpm   Comfort Level Good   Using Accessory Muscles No   Breath Sounds   Right Upper Lobe Diminished   Right Middle Lobe Diminished   Right Lower Lobe Diminished   Left Upper Lobe Diminished   Left Lower Lobe Diminished   Alarm Settings   Alarms On Y   Low Pressure 6 cmH2O   High Pressure  30 cmH2O

## 2022-06-27 NOTE — CARE COORDINATION
HD cath replaced 6/23. Functioning well today per MD notes. Planning for HD tomorrow. PT/OT pending. Possible d/c home tomorrow with [facility]. Established HD MWF Adrienne Frankel.

## 2022-06-27 NOTE — PROGRESS NOTES
06/27/22 0343   NIV Type   $NIV $Daily Charge   NIV Started/Stopped On   Equipment Type v60   Mode Bilevel   Mask Type Full face mask   Mask Size Medium   Settings/Measurements   IPAP 14 cmH20   CPAP/EPAP 8 cmH2O   Rate Ordered 14   Resp 19   FiO2  30 %   Vt Exhaled 494 mL   Mask Leak (lpm) 51 lpm   Comfort Level Good   Using Accessory Muscles No   Alarm Settings   Alarms On Y   Low Pressure 6 cmH2O   High Pressure  30 cmH2O

## 2022-06-27 NOTE — PROGRESS NOTES
Hospitalist Progress Note      PCP: Morelia Hamilton MD    Date of Admission: 6/21/2022    Chief Complaint: Shortness of breath    Hospital Course:   Patient is a 57-year-old male with a past medical history of ESRD on HD, type 2 diabetes, insomnia/anxiety, GERD, CVA, CAD and ischemic cardiomyopathy who presented for shortness of breath. He states that he takes trazodone at home to sleep. However he ran out and was not sleeping well. He slept through his dialysis time on Monday. So therefore he missed HD on Monday. He presented with increased shortness of breath. He denies any fever or chills. He states he has a cough but however this is nonproductive. Subjective:  Patient seen and examined this morning. Resting comfortably in bed. He states that his shortness of breath is on and off. He has no complaints at this time.     Medications:  Reviewed    Infusion Medications    sodium chloride      dextrose       Scheduled Medications    epoetin siddharth-epbx  10,000 Units IntraVENous Q MWF    b complex-C-folic acid  1 capsule Oral Daily    traZODone  150 mg Oral Nightly    apixaban  5 mg Oral BID    clopidogrel  75 mg Oral Daily    calcium acetate  1 capsule Oral TID WC    docusate sodium  100 mg Oral Daily    DULoxetine  30 mg Oral Daily    isosorbide dinitrate  20 mg Oral TID    linaclotide  145 mcg Oral QAM AC    metoprolol succinate  50 mg Oral BID    pantoprazole  40 mg Oral QAM AC    pravastatin  40 mg Oral Daily    QUEtiapine  50 mg Oral Nightly    tiotropium-olodaterol  2 puff Inhalation Daily     PRN Meds: heparin (porcine), heparin (porcine), oxyCODONE-acetaminophen, sodium chloride flush, sodium chloride, ondansetron **OR** ondansetron, acetaminophen **OR** acetaminophen, calcium carbonate, melatonin, LORazepam, glucose, dextrose bolus **OR** dextrose bolus, glucagon (rDNA), dextrose      Intake/Output Summary (Last 24 hours) at 6/27/2022 1410  Last data filed at 6/26/2022 1955  Gross per 24 hour   Intake 720 ml   Output 200 ml   Net 520 ml       Physical Exam Performed:    BP (!) 168/92   Pulse 64   Temp 97.5 °F (36.4 °C) (Oral)   Resp 18   Wt 261 lb 14.5 oz (118.8 kg)   SpO2 100%   BMI 42.27 kg/m²     General appearance: No acute distress, appears stated age and cooperative. HEENT: Conjunctivae/corneas clear. Neck: No jugular venous distention. Respiratory: Normal respiratory effort. Clear to auscultation, bilaterally without rales/wheezes/rhonchi. Cardiovascular: Regular rate and rhythm without murmurs, rubs or gallops. Abdomen: Soft, non-tender, non-distended with normal bowel sounds. Musculoskeletal: No clubbing, cyanosis or edema bilaterally. Skin: Skin color, texture, turgor normal.  No rashes or lesions. Neurologic:  Neurovascularly intact without any focal sensory/motor deficits. Psychiatric: Alert and oriented, thought content appropriate, normal insight    Labs:   Recent Labs     06/25/22 0452 06/26/22  0510 06/27/22  0506   WBC 5.8 7.4 7.5   HGB 9.4* 9.4* 9.0*   HCT 29.1* 28.8* 27.1*    285 281     Recent Labs     06/25/22 0453 06/25/22 0453 06/26/22  0510 06/27/22  0506   *  --  130* 125*   K 5.0  5.0   < > 4.7  4.7 5.3*  5.3*   CL 93*  --  92* 88*   CO2 24  --  24 21   BUN 32*  --  46* 61*   CREATININE 5.8*  --  7.3* 8.6*   CALCIUM 9.2  --  9.2 9.0    < > = values in this interval not displayed.      Recent Labs     06/25/22 0453 06/26/22  0510 06/27/22  0506   AST 7* 8* 12*   ALT <5* <5* <5*   BILITOT <0.2 <0.2 <0.2   ALKPHOS 89 83 90     Urinalysis:    Lab Results   Component Value Date    NITRU Negative 05/29/2022    WBCUA 10-20 05/29/2022    BACTERIA 3+ 05/29/2022    RBCUA 5-10 05/29/2022    BLOODU TRACE-INTACT 05/29/2022    SPECGRAV 1.015 05/29/2022    GLUCOSEU 100 05/29/2022       Radiology:  IR TUNNELED CVC PLACE WO SQ PORT/PUMP > 5 YEARS   Final Result   Successful fluoroscopic guided exchange of a tunneled dialysis catheter and   fibrin sheath disruption. XR CHEST PORTABLE   Final Result   Mild cardiomegaly. Mild-to-moderate congestion and/or infiltrates identified   in the lungs. No significant improvement from recent study. XR CHEST PORTABLE   Final Result   Findings compatible with interstitial edema and small left effusion. Assessment/Plan:    Active Hospital Problems    Diagnosis     Ischemic cardiomyopathy [I25.5]      Priority: High    Type 2 diabetes, uncontrolled, with neuropathy ([facility] [E11.40, E11.65]      Priority: High    Morbid obesity with BMI of 40.0-44.9, adult (HCC) [E66.01, Z68.41]      Priority: Medium    Asthma-COPD overlap syndrome ([facility] [J44.9]      Priority: Medium    Respiratory failure ([facility] [J96.90]     Respiratory failure with hypoxia ([facility] [J96.91]     ESRD (end stage renal disease) ([facility] [N18.6]      1. Pulmonary edema due to missed dialysis session, POA  - Chest x-ray obtained on admission showed interstitial edema and small left effusion  - Chest x-ray showed no significant improvement from his previous study in interstitial edema  - Hemodialysis on 6/21/2022 with 1.9 L removed, 3 L removed on 6/22/2022, 3 L removed on 6/24/2022, HD again today. 2.  ESRD  - HD per nephrology  - HD dialysis catheter was replaced 6/23/2022. Catheter was functioning well today. - Plan for HD again tomorrow    3. Insomnia/anxiety  - Continue trazodone 150 mg  - Continue Ativan as needed  - Continue Cymbalta    4. Type 2 diabetes mellitus  - Hemoglobin A1c on 4/18/2022 was 6.1%, repeat was 6.2%  - No therapy needed    5. GERD  - Continue pantoprazole 40 mg daily    6. PAF  - Currently rate controlled  - Continue metoprolol 50 mg twice daily  - Continue Eliquis 5 mg twice daily    7.   CAD, ischemic cardiomyopathy  - Status post PATRICIA of the RCA on 1/14/2022 and PATRICIA of the LAD in 2015  - Echo completed on 6/3/2022 showed an EF of 20 to 25%  - Continue Plavix 75 mg daily  - Continue metoprolol 50 mg twice daily  - Continue isosorbide dinitrate 20 mg 3 times daily  - Continue pravastatin 40 mg daily      DVT Prophylaxis: Eliquis  Diet: ADULT DIET; Regular; Low Potassium (Less than 3000 mg/day); Low Phosphorus (Less than 1000 mg); 1000 ml  Code Status: Full Code    PT/OT Eval Status: Pending    Dispo -likely tomorrow, assuming no issues with catheter    Karishma Gao DO     Addendum to Resident  Progress note:  Pt seen,examined and evaluated with resident and discussed regarding POC . I have reviewed the current history, physical findings, labs and assessment and plan and agree with note as documented by resident DO ( Dr. Oscar Frey)    Patient seen during his hemodialysis. No new complaints. So far hemodialysis catheter working okay. Noted nephrology plan to do 2 hours of UF  session tomorrow and likely discharge after.   Of note his last [facility] exchange was done on 6/23/2022 due to malfunction, nephrology monitoring for improved blood flows    Rishi Leroy MD  Hospitalist Physician

## 2022-06-27 NOTE — PROGRESS NOTES
[facility] Progress Note           Subjective/   45y.o. year old male who we are seeing in consultation for ESKD on HD. HPI:  The patient was seen and examined; he feels well today with no CP, SOB, nausea or vomiting. ROS: No fever or chills. Social: No family at bedside. Objective/   GEN:  Chronically ill, BP (!) 168/92   Pulse 64   Temp 97.5 °F (36.4 °C) (Oral)   Resp 18   Wt 261 lb 14.5 oz (118.8 kg)   SpO2 100%   BMI 42.27 kg/m²      HEENT: non-icteric, no JVD  CV: S1, S2 without m/r/g; no LE edema  RESP: CTA B without w/r/r; breathing wnl  ABD: +bs, soft, nt, no hsm  SKIN: warm, no rashes  ACCESS: Left IJ TDC (exchanged 6/23)    Data/  Recent Labs     06/25/22  0452 06/26/22  0510 06/27/22  0506   WBC 5.8 7.4 7.5   HGB 9.4* 9.4* 9.0*   HCT 29.1* 28.8* 27.1*   MCV 91.9 91.2 90.2    285 281     Recent Labs     06/25/22  0453 06/25/22  0453 06/26/22  0510 06/27/22  0506   *  --  130* 125*   K 5.0  5.0   < > 4.7  4.7 5.3*  5.3*   CL 93*  --  92* 88*   CO2 24  --  24 21   GLUCOSE 173*  --  157* 163*   MG 1.90  --  2.00 1.90   BUN 32*  --  46* 61*   CREATININE 5.8*  --  7.3* 8.6*   LABGLOM 10*  --  8* 6*   GFRAA 12*  --  9* 8*    < > = values in this interval not displayed.        Assessment/     # End stage kidney disease - on HD Mon-Wed-Fri     # sCHF/Ischemic cardiomyopathy - EF worse at ~20-25%      # CAD: s/p PATRICIA RCA 1/14/2022; s/p PATRICIA LAD 2015     # Bicuspid AV/severe AS: s/p TAVR 10/2019     # Hypertension      # Anemia of chronic disease - DAVON with HD     # Hyponatremia - chronic, due to fluid overload, monitor with HD      Plan/     - HD today per Monday Wednesday Friday schedule   - Will arrange for 2 hours of Ultrafiltration tomorrow  - [facility] exchanged due to malfunction on 6/23, monitor for improved blood flows   - DAVON 10K qHD  - Trend labs, bp's    ____________________________________  Eva Alfaro MD  [facility] [address] [place] [address]. [facility]  Office: [phone]

## 2022-06-27 NOTE — MANAGEMENT PLAN
Patient Management Plan          Pt. Name: Tex Jara  : 1970          Management Plan entered by: JACQUELINE Li      Date plan entered: 22      Patients Physicians:    Primary Care : Natali Tripp MD  Contact #:  [phone]  Specialists:    Contact #:              Summary      Reason for Referral: This patient has been provided a management plan for Medical Needs and Social Needs            Patient has had frequent visits for:             Diagnosis Date    Ambulatory dysfunction       walker for long distances, SOB with distance    Aortic stenosis       echo 2017    Arthritis       hands and hips    Asthma      Bilateral hilar adenopathy syndrome 6/3/2013    CAD (coronary artery disease)       Dr. Navarrete Ice [facility]) 2019     EF= 43%    CHF (congestive heart failure) ([facility]      Chronic pain      COPD (chronic obstructive pulmonary disease) ([facility]       pulmonology Dr. Urrutia Kin    Depression      Diabetes mellitus ([facility]       borderline    Difficult intravenous access      Emphysema of lung ([address]      ESRD (end stage renal disease) on dialysis ([address]       MWF    Fear of needles      Gastric ulcer      GERD (gastroesophageal reflux disease)      Heart valve problem       bicuspic valve    Hemodialysis patient ([address]      History of spinal fracture       work incident    Hx of blood clots       Bilateral lower extremities; stents in place    Hyperlipidemia      Hypertension      MI (myocardial infarction) ([address] 2019     has had 9 MIs. 2019 was the last    Neuromuscular disorder ([address]       due to CVA    Numbness and tingling in left arm       from fistula    Pneumonia      PONV (postoperative nausea and vomiting)      Prolonged emergence from general anesthesia       States requires more medication than most people    Sleep apnea       Uses CPAP    Stroke ([address]       7mm thalamic cva 2017 deficts left side, left side weakness    TIA (transient ischemic attack)      Unspecified diseases of blood and blood-forming organs                                   Warnings/Safety Alerts:         Situation: Chronic Conditions Summary:       Numerous hospitalizations, noncompliant with medications and HD at times. Financial stressors,           Goals/Interventions:     Renal Function, if needs HD call and see if patient can get a slot in a timely manner. [facility]iia Segovia - ask for the  if needed. [phone]  Can also call [facility] admissions to try to find another slot at another clinic at [phone]  Consult nephrology  Consult CHF RN, Williams Calloway at [phone]  Consult SW  IF CHF, consider dose of IV Lasix [id number] dose. and re-eval in 2 hours. [facility]  [address] [facility]) [phone]  Consider Home Care. [facility]. in past  Palliative care consult  May give home Percocet dose of 7.5/325mf x1. No IV narcotics, no IV or oral benzodiazepines. During Business Hours:     After Hours:       Challenges for Self Management: Utilization: ED/Hospital admissions and Emotional status of Patient (depression screening score, guarded affect)         Medication Management: Complications from poor adherence and Poor Adherence           Advanced Care Plan: None                         * This plan has been created by the [place] [address], a multi-disciplinary team. The patient and their physician were invited to participate in this plan. This management plan is intended to provide consistent evaluation and treatment for this patient not to supersede physician judgment. *

## 2022-06-27 NOTE — FLOWSHEET NOTE
06/27/22 0754 06/27/22 1210   Vital Signs   /71 (!) 119/49   Temp 97.7 °F (36.5 °C) 97.4 °F (36.3 °C)   Heart Rate 57 70   Weight 269 lb 13.5 oz (122.4 kg) 261 lb 14.5 oz (118.8 kg)     Treatment time: 210min    Net UF: 3600ml    Pre weight: 122.4k  Post weight: 118.8k  EDW: 109.5k    Access used: LIJ TDC  Access function: Good    Medications or blood products given: Epogen [id number] IV    Regular outpatient schedule: MWF    Summary of response to treatment: Tolerated well. Crit line + for refill. Copy of dialysis treatment record placed in chart, to be scanned into EMR.

## 2022-06-28 VITALS
SYSTOLIC BLOOD PRESSURE: 152 MMHG | HEART RATE: 58 BPM | BODY MASS INDEX: 39.04 KG/M2 | WEIGHT: 242.95 LBS | TEMPERATURE: 97.5 F | RESPIRATION RATE: 16 BRPM | OXYGEN SATURATION: 100 % | DIASTOLIC BLOOD PRESSURE: 72 MMHG | HEIGHT: 66 IN

## 2022-06-28 LAB
A/G RATIO: 2.1 (ref 1.1–2.2)
ALBUMIN SERPL-MCNC: 4 G/DL (ref 3.4–5)
ALP BLD-CCNC: 88 U/L (ref 40–129)
ALT SERPL-CCNC: <5 U/L (ref 10–40)
ANION GAP SERPL CALCULATED.3IONS-SCNC: 14 MMOL/L (ref 3–16)
AST SERPL-CCNC: 8 U/L (ref 15–37)
BASOPHILS ABSOLUTE: 0.1 K/UL (ref 0–0.2)
BASOPHILS RELATIVE PERCENT: 1.3 %
BILIRUB SERPL-MCNC: <0.2 MG/DL (ref 0–1)
BUN BLDV-MCNC: 40 MG/DL (ref 7–20)
CALCIUM SERPL-MCNC: 9.6 MG/DL (ref 8.3–10.6)
CHLORIDE BLD-SCNC: 95 MMOL/L (ref 99–110)
CO2: 23 MMOL/L (ref 21–32)
CREAT SERPL-MCNC: 4.5 MG/DL (ref 0.9–1.3)
EOSINOPHILS ABSOLUTE: 0.3 K/UL (ref 0–0.6)
EOSINOPHILS RELATIVE PERCENT: 4.4 %
GFR AFRICAN AMERICAN: 17
GFR NON-AFRICAN AMERICAN: 14
GLUCOSE BLD-MCNC: 163 MG/DL (ref 70–99)
HCT VFR BLD CALC: 28.7 % (ref 40.5–52.5)
HEMOGLOBIN: 9.3 G/DL (ref 13.5–17.5)
LYMPHOCYTES ABSOLUTE: 1.1 K/UL (ref 1–5.1)
LYMPHOCYTES RELATIVE PERCENT: 15 %
MAGNESIUM: 2 MG/DL (ref 1.8–2.4)
MCH RBC QN AUTO: 29.1 PG (ref 26–34)
MCHC RBC AUTO-ENTMCNC: 32.3 G/DL (ref 31–36)
MCV RBC AUTO: 90.3 FL (ref 80–100)
MONOCYTES ABSOLUTE: 0.7 K/UL (ref 0–1.3)
MONOCYTES RELATIVE PERCENT: 10.3 %
NEUTROPHILS ABSOLUTE: 4.9 K/UL (ref 1.7–7.7)
NEUTROPHILS RELATIVE PERCENT: 69 %
PDW BLD-RTO: 16.4 % (ref 12.4–15.4)
PLATELET # BLD: 303 K/UL (ref 135–450)
PMV BLD AUTO: 7.9 FL (ref 5–10.5)
POTASSIUM REFLEX MAGNESIUM: 5.1 MMOL/L (ref 3.5–5.1)
POTASSIUM SERPL-SCNC: 5.1 MMOL/L (ref 3.5–5.1)
RBC # BLD: 3.18 M/UL (ref 4.2–5.9)
SODIUM BLD-SCNC: 132 MMOL/L (ref 136–145)
TOTAL PROTEIN: 5.9 G/DL (ref 6.4–8.2)
WBC # BLD: 7.1 K/UL (ref 4–11)

## 2022-06-28 PROCEDURE — 90935 HEMODIALYSIS ONE EVALUATION: CPT

## 2022-06-28 PROCEDURE — 6360000002 HC RX W HCPCS

## 2022-06-28 PROCEDURE — 6370000000 HC RX 637 (ALT 250 FOR IP): Performed by: INTERNAL MEDICINE

## 2022-06-28 PROCEDURE — 94660 CPAP INITIATION&MGMT: CPT

## 2022-06-28 PROCEDURE — 2500000003 HC RX 250 WO HCPCS: Performed by: INTERNAL MEDICINE

## 2022-06-28 PROCEDURE — 2700000000 HC OXYGEN THERAPY PER DAY

## 2022-06-28 PROCEDURE — 80053 COMPREHEN METABOLIC PANEL: CPT

## 2022-06-28 PROCEDURE — 85025 COMPLETE CBC W/AUTO DIFF WBC: CPT

## 2022-06-28 PROCEDURE — 36415 COLL VENOUS BLD VENIPUNCTURE: CPT

## 2022-06-28 PROCEDURE — 83735 ASSAY OF MAGNESIUM: CPT

## 2022-06-28 PROCEDURE — 94761 N-INVAS EAR/PLS OXIMETRY MLT: CPT

## 2022-06-28 RX ORDER — HEPARIN SODIUM 1000 [USP'U]/ML
INJECTION, SOLUTION INTRAVENOUS; SUBCUTANEOUS
Status: COMPLETED
Start: 2022-06-28 | End: 2022-06-28

## 2022-06-28 RX ADMIN — METOPROLOL SUCCINATE 50 MG: 50 TABLET, EXTENDED RELEASE ORAL at 11:37

## 2022-06-28 RX ADMIN — HEPARIN SODIUM 1000 UNITS: 1000 INJECTION INTRAVENOUS; SUBCUTANEOUS at 10:34

## 2022-06-28 RX ADMIN — ISOSORBIDE DINITRATE 20 MG: 20 TABLET ORAL at 11:37

## 2022-06-28 RX ADMIN — APIXABAN 5 MG: 5 TABLET, FILM COATED ORAL at 11:37

## 2022-06-28 RX ADMIN — PRAVASTATIN SODIUM 40 MG: 40 TABLET ORAL at 11:37

## 2022-06-28 RX ADMIN — CLOPIDOGREL BISULFATE 75 MG: 75 TABLET ORAL at 11:37

## 2022-06-28 RX ADMIN — NEPHROCAP 1 MG: 1 CAP ORAL at 11:37

## 2022-06-28 RX ADMIN — DULOXETINE HYDROCHLORIDE 30 MG: 30 CAPSULE, DELAYED RELEASE ORAL at 11:37

## 2022-06-28 RX ADMIN — CALCIUM ACETATE 667 MG: 667 CAPSULE ORAL at 11:37

## 2022-06-28 NOTE — CARE COORDINATION
Pt is from home, IPTA, missed his dialysis appointment prior to admission. Pt active w/[facility]. Pt now on bipap and O2, wears O at home, but has a cpap through [facility]. Pt has been active w/[facility] in the past and would be open to [place] [id number] at d/c. Family will transport. CM will continue to follow for needs.   Gaby Marvin RN

## 2022-06-28 NOTE — PROGRESS NOTES
PIV removed. Tip intact. Dressing in place. Tele box removed. [facility] notified of pt discharge. TVC in place. Discharge paperwork went over with and given to pt. Verbalizes understanding. Pt lock box emptied. Pt wheeled via wheelchair to private car with all belongings with home O2 tank. Pt discharge home with home health care in stable condition.

## 2022-06-28 NOTE — CARE COORDINATION
CASE MANAGEMENT DISCHARGE SUMMARY      Discharge to: home w/[facility] IMM given: (date) 06/28/2022    New Durable Medical Equipment ordered/agency: spouse will bring tanks    Transportation: private  Confirmed discharge plan with:     Patient: yes, pt stated he will contact wife     RN, name: Teressa Mancilla RN    Note: [facility] contacted and will pull orders from chart. Spouse will bring tanks.   Cherrie Hebert RN

## 2022-06-28 NOTE — PROGRESS NOTES
Comprehensive Nutrition Assessment    Type and Reason for Visit:  Initial    Nutrition Recommendations/Plan:   1. Continue low phosphorus, low potassium diet  2. 1000 ml FR per MD  3. Encourage nutrition  4. Monitor po intakes, nutrition adequacy, weights, pertinent labs, BMs     Malnutrition Assessment:  Malnutrition Status: At risk for malnutrition (Comment) (06/28/22 6992)      Nutrition Assessment:    LOS assessment. Pt with PMHx ESRD on HD, type 2 diabetes, insomnia/anxiety, GERD, CVA, CAD and ischemic cardiomyopathy who presented for shortness of breath. Currently on a low potassium, low phosphorus diet with po intakes % per EMR. Pt reports that his appetite is okay currently. Pt denied having any nutrition questions or need for diet education. Will monitor. Nutrition Related Findings:    Non-pitting BLE edema Wound Type: None       Current Nutrition Intake & Therapies:    Average Meal Intake: %  Average Supplements Intake: None Ordered  ADULT DIET; Regular; Low Potassium (Less than 3000 mg/day); Low Phosphorus (Less than 1000 mg); 1000 ml    Anthropometric Measures:  Height: 5' 6\" (167.6 cm)  Ideal Body Weight (IBW): 142 lbs (65 kg)       Current Body Weight: 242 lb 15.2 oz (110.2 kg), 171.1 % IBW.  Weight Source: Bed Scale  Current BMI (kg/m2): 39.2                          BMI Categories: Obese Class 2 (BMI 35.0 -39.9)    Estimated Daily Nutrient Needs:  Energy Requirements Based On: Kcal/kg  Weight Used for Energy Requirements: Current  Energy (kcal/day): 2387-7665  Weight Used for Protein Requirements: Ideal  Protein (g/day): 78-91  Method Used for Fluid Requirements: Other (Comment)  Fluid (ml/day): 1000 ml FR per MD    Nutrition Diagnosis:   No nutrition diagnosis at this time    Nutrition Interventions:   Food and/or Nutrient Delivery: Continue Current Diet  Nutrition Education/Counseling: No recommendation at this time  Coordination of Nutrition Care: Continue to monitor while inpatient  Plan of Care discussed with: Patient    Goals:     Goals: Meet at least 75% of estimated needs,prior to discharge       Nutrition Monitoring and Evaluation:   Behavioral-Environmental Outcomes: None Identified  Food/Nutrient Intake Outcomes: Food and Nutrient Intake  Physical Signs/Symptoms Outcomes: Biochemical Data,Weight    Discharge Planning:    Continue current diet     Juan Manuel Campa RD, LD  Contact: [phone]

## 2022-06-28 NOTE — PROGRESS NOTES
06/28/22 0330   NIV Type   Equipment Type v60   Mode Bilevel   Mask Type Full face mask   Mask Size Medium   Settings/Measurements   IPAP 14 cmH20   CPAP/EPAP 8 cmH2O   Rate Ordered 14   Resp 20   FiO2  30 %   I Time/ I Time % 1 s   Vt Exhaled 478 mL   Minute Volume 9.5 Liters   Mask Leak (lpm) 43 lpm   Comfort Level Good   Using Accessory Muscles No   SpO2 96   Alarm Settings   Alarms On Y   Low Pressure 6 cmH2O   High Pressure  30 cmH2O   Apnea (secs) 20 secs   RR Low  6   RR High 40 br/min

## 2022-06-28 NOTE — PROGRESS NOTES
06/28/22 0000   NIV Type   $NIV $Daily Charge   Equipment Type v60   Mode Bilevel   Mask Type Full face mask   Mask Size Medium   Settings/Measurements   IPAP 14 cmH20   CPAP/EPAP 8 cmH2O   Rate Ordered 14   Resp 17   FiO2  30 %   I Time/ I Time % 1 s   Vt Exhaled 630 mL   Minute Volume 10.8 Liters   Mask Leak (lpm) 56 lpm   Comfort Level Good   Using Accessory Muscles No   SpO2 97   Patient's Home Machine No   Alarm Settings   Alarms On Y   Low Pressure 6 cmH2O   High Pressure  30 cmH2O   Apnea (secs) 20 secs   RR Low  6   RR High 40 br/min

## 2022-06-28 NOTE — FLOWSHEET NOTE
Treatment time: 2 hours ( Ultrafiltration )  Net UF: 3000 ml    Pre weight: 113.2 kg   Post weight: 110.2 kg  EDW: TBD kg    Access used: Left chest wall TDC  Access function: Good with  ml/min    Medications or blood products given: no    Regular outpatient schedule: M.W.F    Summary of response to treatment: Pt tolerated well with HD, vital signs stable, HD completed in full, heparin dwell in TDC, capped and clamped. Cirt Line: No set up      Copy of dialysis treatment record placed in chart, to be scanned into EMR.     06/28/22 0834 06/28/22 1045   Vital Signs   BP (!) 104/51 139/89   Temp 98.3 °F (36.8 °C) 99 °F (37.2 °C)   Heart Rate 61 58   Resp 20 16   Weight 249 lb 9 oz (113.2 kg) 242 lb 15.2 oz (110.2 kg)   Weight Method Actual;Bed scale Actual;Bed scale   Percent Weight Change -4.07 -2.65   Post-Hemodialysis Assessment   Post-Treatment Procedures  --  Blood returned;Catheter capped, clamped and heparinized x 2 ports   Machine Disinfection Process  --  Acid/Vinegar Clean;Heat Disinfect; Exterior Machine Disinfection   Rinseback Volume (ml)  --  400 ml   Total Liters Processed (l/min)  --  0 l/min   Dialyzer Clearance  --  Moderately streaked   Duration of Treatment (minutes)  --  120 minutes   Heparin amount administered during treatment (units)  --  0 units   Hemodialysis Intake (ml)  --  400 ml   Hemodialysis Output (ml)  --  3400 ml   NET Removed (ml)  --  3000   Tolerated Treatment  --  Good

## 2022-06-28 NOTE — DISCHARGE SUMMARY
Hospital Medicine Discharge Summary    Patient ID: Sparkle Romero      Patient's PCP: Aixa Mijares MD    Admit Date: 6/21/2022     Discharge Date:   6/28/2022    Admitting Provider: Rip Mitchell MD     Discharge Provider: Karishma Gao DO     Discharge Diagnoses: Active Hospital Problems    Diagnosis     Ischemic cardiomyopathy [I25.5]      Priority: High    Type 2 diabetes, uncontrolled, with neuropathy ([facility]) [E11.40, E11.65]      Priority: High    Morbid obesity with BMI of 40.0-44.9, adult ([facility]) [E66.01, Z68.41]      Priority: Medium    Asthma-COPD overlap syndrome ([facility] [J44.9]      Priority: Medium    Respiratory failure ([address] [J96.90]     Respiratory failure with hypoxia ([address] [J96.91]     ESRD (end stage renal disease) ([facility] [N18.6]        The patient was seen and examined on day of discharge and this discharge summary is in conjunction with any daily progress note from day of discharge. Hospital Course:   Patient is a 47-year-old male with a past medical history of ESRD on HD, type 2 diabetes, insomnia/anxiety, GERD, CVA, CAD and ischemic cardiomyopathy who presented for shortness of breath. He states that he takes trazodone at home to sleep. However he ran out and was not sleeping well. He slept through his dialysis time on Monday. So therefore he missed HD on Monday. He presented with increased shortness of breath. He denies any fever or chills. He states he has a cough but however this is nonproductive. 1.  Pulmonary edema due to missed dialysis session, POA  - Chest x-ray obtained on admission showed interstitial edema and small left effusion  - Chest x-ray showed no significant improvement from his previous study in interstitial edema  - Hemodialysis on 6/21/2022 with 1.9 L removed, 3 L removed on 6/22/2022, 3 L removed on 6/24/2022, 3 L removed on 6/27/2022. He underwent another ultrafiltration today with 3 more liters removed.     2.   ESRD  - HD per nephrology  - HD dialysis catheter was replaced 6/23/2022. Catheter was functioning well today. - Nephro is okay with patient going home     3. Insomnia/anxiety  - Continue trazodone 150 mg  - Continue Cymbalta     4. Type 2 diabetes mellitus  - Hemoglobin A1c on 4/18/2022 was 6.1%, repeat was 6.2%  - Continue to monitor     5. GERD  - Continue pantoprazole 40 mg daily     6. PAF  - Currently rate controlled  - Continue metoprolol 50 mg twice daily  - Continue Eliquis 5 mg twice daily     7. CAD, ischemic cardiomyopathy  - Status post PATRICIA of the RCA on 1/14/2022 and PATRICIA of the LAD in 2015  - Echo completed on 6/3/2022 showed an EF of 20 to 25%  - Continue Plavix 75 mg daily  - Continue metoprolol 50 mg twice daily  - Continue isosorbide dinitrate 20 mg 3 times daily  - Continue pravastatin 40 mg daily        Physical Exam Performed:     BP (!) 152/72   Pulse 58   Temp 97.5 °F (36.4 °C) (Oral)   Resp 16   Wt 242 lb 15.2 oz (110.2 kg)   SpO2 100%   BMI 39.21 kg/m²       General appearance:  No apparent distress, appears stated age and cooperative. HEENT:  Normal cephalic, atraumatic without obvious deformity. Conjunctivae/corneas clear. Neck: No jugular venous distention. Respiratory:  Normal respiratory effort. Clear to auscultation, bilaterally without Rales/Wheezes/Rhonchi. Cardiovascular:  Regular rate and rhythm without murmurs, rubs or gallops. Abdomen: Soft, non-tender, non-distended with normal bowel sounds. Musculoskeletal:  No clubbing, cyanosis or edema bilaterally. Skin: Skin color, texture, turgor normal.  No rashes or lesions. Neurologic:  Neurovascularly intact without any focal sensory/motor deficits. Psychiatric:  Alert and oriented, thought content appropriate, normal insight      Labs:  For convenience and continuity at follow-up the following most recent labs are provided:      CBC:    Lab Results   Component Value Date    WBC 7.1 06/28/2022    HGB 9.3 06/28/2022    HCT 28.7 06/28/2022     06/28/2022       Renal:    Lab Results   Component Value Date     06/28/2022    K 5.1 06/28/2022    K 5.1 06/28/2022    CL 95 06/28/2022    CO2 23 06/28/2022    BUN 40 06/28/2022    CREATININE 4.5 06/28/2022    CALCIUM 9.6 06/28/2022    PHOS 5.9 06/22/2022         Significant Diagnostic Studies    Radiology:   IR TUNNELED CVC PLACE WO SQ PORT/PUMP > 5 YEARS   Final Result   Successful fluoroscopic guided exchange of a tunneled dialysis catheter and   fibrin sheath disruption. XR CHEST PORTABLE   Final Result   Mild cardiomegaly. Mild-to-moderate congestion and/or infiltrates identified   in the lungs. No significant improvement from recent study. XR CHEST PORTABLE   Final Result   Findings compatible with interstitial edema and small left effusion. Consults:     IP CONSULT TO HOSPITALIST  IP CONSULT TO NEPHROLOGY  IP CONSULT TO HEART FAILURE NURSE/COORDINATOR  IP CONSULT TO DIETITIAN  IP CONSULT TO HOME CARE NEEDS    Disposition: To home with home health care    Condition at Discharge: Stable    Discharge Instructions/Follow-up: Follow-up with PCP    Code Status:  Full Code     Activity: activity as tolerated    Diet: diabetic diet and renal diet      Discharge Medications:     Current Discharge Medication List           Details   isosorbide dinitrate (ISORDIL) 20 MG tablet Take 1 tablet by mouth 3 times daily  Qty: 90 tablet, Refills: 3      oxyCODONE-acetaminophen (PERCOCET) 7.5-325 MG per tablet Take 1 tablet by mouth every 6 hours as needed (pain) for up to 30 days.   Qty: 120 tablet, Refills: 0    Comments: Reduce doses taken as pain becomes manageable  Associated Diagnoses: Chronic pain syndrome      clopidogrel (PLAVIX) 75 MG tablet Take 1 tablet by mouth daily  Qty: 90 tablet, Refills: 3      cyclobenzaprine (FLEXERIL) 10 MG tablet TAKE 1 TABLET BY MOUTH EVERY 8 HOURS AS NEEDED  Qty: 90 tablet, Refills: 10    Associated Diagnoses: Chronic pain syndrome      pantoprazole (PROTONIX) 40 MG tablet TAKE 1 TABLET BY MOUTH EVERY MORNING BEFORE BREAKFAST  Qty: 30 tablet, Refills: 10    Associated Diagnoses: Gastroesophageal reflux disease without esophagitis      QUEtiapine (SEROQUEL) 50 MG tablet TAKE 1 TABLET BY MOUTH IN THE EVENING  Qty: 30 tablet, Refills: 10    Associated Diagnoses: Insomnia, unspecified type; Mood disorder ([facility])      docusate sodium (DOK) 100 MG capsule Take 1 capsule by mouth daily  Qty: 30 capsule, Refills: 5      LINZESS 145 MCG capsule TAKE 1 CAPSULE BY MOUTH IN THE MORNING BEFORE BREAKFAST  Qty: 30 capsule, Refills: 10    Associated Diagnoses: Chronic idiopathic constipation      DULoxetine (CYMBALTA) 30 MG extended release capsule TAKE 1 CAPSULE BY MOUTH EVERY DAY  Qty: 30 capsule, Refills: 10    Associated Diagnoses: Depression, unspecified depression type      mineral oil liquid Take 30 mLs by mouth daily as needed for Constipation  Qty: 300 mL, Refills: 0    Associated Diagnoses: Constipation, unspecified constipation type      sennosides-docusate sodium (SENOKOT-S) 8.6-50 MG tablet Take 1 tablet by mouth daily  Qty: 10 tablet, Refills: 0    Associated Diagnoses: Constipation, unspecified constipation type      metoprolol succinate (TOPROL XL) 50 MG extended release tablet Take 1 tablet by mouth in the morning and at bedtime  Qty: 60 tablet, Refills: 1      apixaban (ELIQUIS) 5 MG TABS tablet Take 1 tablet by mouth 2 times daily  Qty: 60 tablet, Refills: 1      pravastatin (PRAVACHOL) 40 MG tablet Take 1 tablet by mouth daily  Qty: 90 tablet, Refills: 3      Continuous Blood Gluc Sensor (DEXCOM G6 SENSOR) MISC Every 10 days  Qty: 9 each, Refills: 3    Associated Diagnoses: DM (diabetes mellitus), secondary, uncontrolled, w/neurologic complic (HCC)      Continuous Blood Gluc Transmit (DEXCOM G6 TRANSMITTER) MISC 1 each by Does not apply route every 3 months  Qty: 1 each, Refills: 3    Associated Diagnoses: DM (diabetes mellitus), secondary, uncontrolled, w/neurologic complic ([facility]      Continuous Blood Gluc  (DEXCOM G6 ) ADAM 1 each by Does not apply route Daily with lunch  Qty: 1 each, Refills: 0    Associated Diagnoses: DM (diabetes mellitus), secondary, uncontrolled, w/neurologic complic (HCC)      B Complex-C-Folic Acid (VIRT-CAPS) 1 MG CAPS TAKE 1 CAPSULE BY MOUTH EVERY DAY  Qty: 90 capsule, Refills: 1      Calcium Acetate, Phos Binder, 667 MG CAPS TAKE 1 CAPSULE BY MOUTH THREE TIMES DAILY WITH MEALS  Qty: 90 capsule, Refills: 3      nitroGLYCERIN (NITROSTAT) 0.4 MG SL tablet DISSOLVE 1 TABLET UNDER THE TONGUE AS NEEDED FOR CHEST PAIN EVERY 5 MINUTES UP TO 3 TIMES. IF NO RELIEF CALL 911. Qty: 25 tablet, Refills: 10    Associated Diagnoses: Coronary artery disease involving native heart without angina pectoris, unspecified vessel or lesion type      vitamin D (ERGOCALCIFEROL) 94790 units CAPS capsule TK 1 C PO WEEKLY  Refills: 11      Tiotropium Bromide-Olodaterol (STIOLTO RESPIMAT) 2.5-2.5 MCG/ACT AERS Inhale 2 puffs into the lungs daily  Qty: 2 Inhaler, Refills: 0    Comments: 2 samples given:  Lot #[id number], Exp 7/21 & Lot #[id number], Exp 7/21      Blood Glucose Monitoring Suppl ADAM USE AS DIRECTED. Qty: 1 Device, Refills: 0    Comments: WITH CONTROL SOLUTION. Alcohol Swabs PADS USE AS DIRECTED  Qty: 300 each, Refills: 3      albuterol sulfate  (90 Base) MCG/ACT inhaler Inhale 2 puffs into the lungs every 6 hours as needed for Wheezing  Qty: 1 Inhaler, Refills: 3      ipratropium-albuterol (DUONEB) 0.5-2.5 (3) MG/3ML SOLN nebulizer solution Inhale 3 mLs into the lungs every 6 hours as needed for Shortness of Breath  Qty: 360 mL, Refills: 1      calcium carbonate (TUMS) 500 MG chewable tablet Take 1 tablet by mouth 3 times daily as needed for Heartburn.              Time Spent on discharge is more than 45 minutes in the examination, evaluation, counseling and review of medications and discharge plan.      Signed:    Ginger Mahoney DO   6/28/2022      Thank you Lea Mahan MD for the opportunity to be involved in this patient's care. If you have any questions or concerns, please feel free to contact me at [phone].

## 2022-06-28 NOTE — PROGRESS NOTES
[facility] Progress Note           Subjective/   45y.o. year old male who we are seeing in consultation for ESKD on HD. HPI:  The patient was seen and examined; he feels well today with no CP, SOB, nausea or vomiting. ROS: No fever or chills. Social: No family at bedside. Objective/   GEN:  Chronically ill, BP (!) 104/51   Pulse 61   Temp 98.3 °F (36.8 °C)   Resp 20   Wt 249 lb 9 oz (113.2 kg)   SpO2 97%   BMI 40.28 kg/m²      HEENT: non-icteric, no JVD  CV: S1, S2 without m/r/g; no LE edema  RESP: CTA B without w/r/r; breathing wnl  ABD: +bs, soft, nt, no hsm  SKIN: warm, no rashes  ACCESS: Left IJ TDC (exchanged 6/23)    Data/  Recent Labs     06/26/22  0510 06/27/22  0506 06/28/22  0452   WBC 7.4 7.5 7.1   HGB 9.4* 9.0* 9.3*   HCT 28.8* 27.1* 28.7*   MCV 91.2 90.2 90.3    281 303     Recent Labs     06/26/22  0510 06/26/22  0510 06/27/22  0506 06/28/22  0452   *  --  125* 132*   K 4.7  4.7   < > 5.3*  5.3* 5.1  5.1   CL 92*  --  88* 95*   CO2 24  --  21 23   GLUCOSE 157*  --  163* 163*   MG 2.00  --  1.90 2.00   BUN 46*  --  61* 40*   CREATININE 7.3*  --  8.6* 4.5*   LABGLOM 8*  --  6* 14*   GFRAA 9*  --  8* 17*    < > = values in this interval not displayed. Assessment/     # End stage kidney disease - on HD Mon-Wed-Fri     # sCHF/Ischemic cardiomyopathy - EF worse at ~20-25%      # CAD: s/p PATRICIA RCA 1/14/2022; s/p PATRICIA LAD 2015     # Bicuspid AV/severe AS: s/p TAVR 10/2019     # Hypertension      # Anemia of chronic disease - DAVON with HD     # Hyponatremia - chronic, due to fluid overload, monitor with HD      Plan/     - Will arrange for 2 hours of Ultrafiltration today  - HD tomorrow per Monday Wednesday Friday schedule   - DAVON 10K qHD  - Trend labs, bp's    ____________________________________  Darlin Chavez MD  [facility]  [email or web address]  Office: [phone]

## 2022-06-29 ENCOUNTER — TELEPHONE (OUTPATIENT)
Dept: FAMILY MEDICINE CLINIC | Age: 52
End: 2022-06-29

## 2022-06-29 ENCOUNTER — CARE COORDINATION (OUTPATIENT)
Dept: CASE MANAGEMENT | Age: 52
End: 2022-06-29

## 2022-06-29 NOTE — TELEPHONE ENCOUNTER
Received call from Eloy morales from [employer] asking if Eduar Archuleta will continue to follow Pt for homecare.      Eloy morales   Ph. [phone]  Fax [phone]

## 2022-06-29 NOTE — CARE COORDINATION
[address] Transitions Initial Follow Up Call    Call within 2 business days of discharge: Yes    Patient: Ryley Turner Patient : 1970   MRN: [id number]  Reason for Admission: respiratory failure  Discharge Date: 22 RARS: Readmission Risk Score: 49 ( )      Last Discharge [facility]       Complaint Diagnosis Description Type Department Provider    22 Shortness of Breath Acute respiratory failure with hypoxia ([address] . .. ED to Hosp-Admission (Discharged) (ADMITTED) Owen Garcia MD; Bryce Khan... Spoke with: Denae Rai at [address] [place] [address] and confirmed referral was received. SOC scheduled today. Facility: A    Non-face-to-face services provided:  Communication with home health agencies or other community services the patient is currently using-[facility]     Patient stated that he had home care nurse present for [facility] visit and scheduled for dialysis today. Patient request call tomorrow.   CTN rescheduled per request    Care Transitions 24 Hour Call    Do you have a copy of your discharge instructions?: Yes  Do you have all of your prescriptions and are they filled?: Yes  Have you been contacted by a [address]?: No  Have you scheduled your follow up appointment?: No  How are you going to get to your appointment?: Car - family or friend to transport  [address]: [address] you have support at home?: Partner/Spouse/SO  Do you feel like you have everything you need to keep you well at home?: Yes  Are you an active caregiver in your home?: No  Care Transitions Interventions         Follow Up  Future Appointments   Date Time Provider Nelly Darby   2022 11:15 AM Isamar Montiel MD [facility] [address]   2022  2:15 PM Nicki Merlin, APRN - ILAN Wolf RN

## 2022-06-30 ENCOUNTER — FOLLOWUP TELEPHONE ENCOUNTER (OUTPATIENT)
Dept: TELEMETRY | Age: 52
End: 2022-06-30

## 2022-06-30 ENCOUNTER — CARE COORDINATION (OUTPATIENT)
Dept: CASE MANAGEMENT | Age: 52
End: 2022-06-30

## 2022-06-30 ENCOUNTER — TELEPHONE (OUTPATIENT)
Dept: FAMILY MEDICINE CLINIC | Age: 52
End: 2022-06-30

## 2022-06-30 DIAGNOSIS — F39 MOOD DISORDER (HCC): ICD-10-CM

## 2022-06-30 DIAGNOSIS — G47.00 INSOMNIA, UNSPECIFIED TYPE: ICD-10-CM

## 2022-06-30 RX ORDER — QUETIAPINE FUMARATE 50 MG/1
TABLET, FILM COATED ORAL
Qty: 30 TABLET | Refills: 10 | Status: SHIPPED | OUTPATIENT
Start: 2022-06-30

## 2022-06-30 NOTE — TELEPHONE ENCOUNTER
Received call from Jaziel Younger from [address] stating that Pt has started homecare.      [id number]    Ph. [phone]

## 2022-06-30 NOTE — CARE COORDINATION
[address] Transitions Initial Follow Up Call    Call within 2 business days of discharge: Yes    Patient: Madhav Felix Patient : 1970   MRN: [id number]  Reason for Admission: respiratory failure  Discharge Date: 22 RARS: Readmission Risk Score: 49 ( )      Last Discharge [facility]       Complaint Diagnosis Description Type Department Provider    22 Shortness of Breath Acute respiratory failure with hypoxia ([facility] . .. ED to Hosp-Admission (Discharged) (ADMITTED) Santiago Romero MD; Victorino Joshi... Spoke with: Evelyn 141: [facility]    Non-face-to-face services provided:  Obtained and reviewed discharge summary and/or continuity of care documents     Transitions of Care Initial Call    Challenges to be reviewed by the provider   Additional needs identified to be addressed with provider: No  none             Method of communication with provider : none    Advance Care Planning:   Does patient have an Advance Directive: reviewed and current, reviewed and needs to be updated, not on file; education provided, not on file, patient declined education, decision maker updated and referral to internal ACP facilitator. Care Transition Nurse contacted the patient by telephone to perform post hospital discharge assessment. Verified name and  with patient as identifiers. Provided introduction to self, and explanation of the CTN role. CTN reviewed discharge instructions, medical action plan and red flags with patient who verbalized understanding. Patient given an opportunity to ask questions and does not have any further questions or concerns at this time. Were discharge instructions available to patient? Yes. Reviewed appropriate site of care based on symptoms and resources available to patient including: PCP  Specialist  Home health. The patient agrees to contact the PCP office for questions related to their healthcare.      Medication reconciliation

## 2022-06-30 NOTE — TELEPHONE ENCOUNTER
[address] Transitions Initial Follow Up Call    Call within 2 business days of discharge: Yes     Patient: Dariusz Cid Patient : 1970 MRN: [id number]    [unfilled]    RARS: Readmission Risk Score: 52 ( )       Spoke with: fredis    Discharge department/facility: home with [facility]    Non-face-to-face services provided:  Scheduled appointment with PCP-22     Spoke to patient for hospital follow up. Pt states he is doing well. RN with [facility] is present now for visit. Denies any needs at this time.      Follow Up  Future Appointments   Date Time Provider Nelly Darby   2022  2:15 PM JAY Chavira - ILAN Avalos RN

## 2022-06-30 NOTE — TELEPHONE ENCOUNTER
Renetta [employer] insurance  informing PCP that Pt was discharged from hospital stay from 6/21 to 6/27 discharge.  Reason: COPD   Renetta Xavier [phone]

## 2022-07-04 ENCOUNTER — APPOINTMENT (OUTPATIENT)
Dept: GENERAL RADIOLOGY | Age: 52
End: 2022-07-04
Payer: COMMERCIAL

## 2022-07-04 ENCOUNTER — HOSPITAL ENCOUNTER (EMERGENCY)
Age: 52
Discharge: HOME | End: 2022-07-04
Payer: COMMERCIAL

## 2022-07-04 VITALS
DIASTOLIC BLOOD PRESSURE: 90 MMHG | OXYGEN SATURATION: 98 % | WEIGHT: 242 LBS | SYSTOLIC BLOOD PRESSURE: 171 MMHG | HEART RATE: 76 BPM | BODY MASS INDEX: 39.06 KG/M2 | TEMPERATURE: 98.1 F | RESPIRATION RATE: 19 BRPM

## 2022-07-04 DIAGNOSIS — Z99.81 SUPPLEMENTAL OXYGEN DEPENDENT: ICD-10-CM

## 2022-07-04 DIAGNOSIS — Z99.2 ESRD ON HEMODIALYSIS (HCC): ICD-10-CM

## 2022-07-04 DIAGNOSIS — N18.6 ESRD ON HEMODIALYSIS (HCC): ICD-10-CM

## 2022-07-04 DIAGNOSIS — Z87.09 HISTORY OF COPD: ICD-10-CM

## 2022-07-04 DIAGNOSIS — F17.200 SMOKER: ICD-10-CM

## 2022-07-04 DIAGNOSIS — R06.02 SHORTNESS OF BREATH: Primary | ICD-10-CM

## 2022-07-04 LAB
A/G RATIO: 1.3 (ref 1.1–2.2)
ALBUMIN SERPL-MCNC: 4.5 G/DL (ref 3.4–5)
ALP BLD-CCNC: 104 U/L (ref 40–129)
ALT SERPL-CCNC: <5 U/L (ref 10–40)
ANION GAP SERPL CALCULATED.3IONS-SCNC: 22 MMOL/L (ref 3–16)
AST SERPL-CCNC: 18 U/L (ref 15–37)
BASE EXCESS VENOUS: -7 MMOL/L (ref -3–3)
BASOPHILS ABSOLUTE: 0.1 K/UL (ref 0–0.2)
BASOPHILS RELATIVE PERCENT: 0.8 %
BILIRUB SERPL-MCNC: <0.2 MG/DL (ref 0–1)
BUN BLDV-MCNC: 95 MG/DL (ref 7–20)
CALCIUM SERPL-MCNC: 9.6 MG/DL (ref 8.3–10.6)
CARBOXYHEMOGLOBIN: 3 % (ref 0–1.5)
CHLORIDE BLD-SCNC: 91 MMOL/L (ref 99–110)
CO2: 19 MMOL/L (ref 21–32)
CREAT SERPL-MCNC: 9.9 MG/DL (ref 0.9–1.3)
EOSINOPHILS ABSOLUTE: 0.2 K/UL (ref 0–0.6)
EOSINOPHILS RELATIVE PERCENT: 1.8 %
GFR AFRICAN AMERICAN: 7
GFR NON-AFRICAN AMERICAN: 6
GLUCOSE BLD-MCNC: 177 MG/DL (ref 70–99)
HCO3 VENOUS: 20.1 MMOL/L (ref 23–29)
HCT VFR BLD CALC: 36.3 % (ref 40.5–52.5)
HEMOGLOBIN: 11.5 G/DL (ref 13.5–17.5)
LYMPHOCYTES ABSOLUTE: 0.8 K/UL (ref 1–5.1)
LYMPHOCYTES RELATIVE PERCENT: 6.8 %
MCH RBC QN AUTO: 29.2 PG (ref 26–34)
MCHC RBC AUTO-ENTMCNC: 31.8 G/DL (ref 31–36)
MCV RBC AUTO: 92.1 FL (ref 80–100)
METHEMOGLOBIN VENOUS: 0.2 %
MONOCYTES ABSOLUTE: 0.5 K/UL (ref 0–1.3)
MONOCYTES RELATIVE PERCENT: 4.5 %
NEUTROPHILS ABSOLUTE: 9.7 K/UL (ref 1.7–7.7)
NEUTROPHILS RELATIVE PERCENT: 86.1 %
O2 SAT, VEN: 63 %
O2 THERAPY: ABNORMAL
PCO2, VEN: 47 MMHG (ref 40–50)
PDW BLD-RTO: 16.2 % (ref 12.4–15.4)
PH VENOUS: 7.25 (ref 7.35–7.45)
PLATELET # BLD: 273 K/UL (ref 135–450)
PMV BLD AUTO: 7.8 FL (ref 5–10.5)
PO2, VEN: 36.6 MMHG (ref 25–40)
POTASSIUM REFLEX MAGNESIUM: 6 MMOL/L (ref 3.5–5.1)
PRO-BNP: ABNORMAL PG/ML (ref 0–124)
RBC # BLD: 3.95 M/UL (ref 4.2–5.9)
SODIUM BLD-SCNC: 132 MMOL/L (ref 136–145)
SPECIMEN STATUS: NORMAL
TCO2 CALC VENOUS: 22 MMOL/L
TOTAL PROTEIN: 8.1 G/DL (ref 6.4–8.2)
TROPONIN: 0.1 NG/ML
WBC # BLD: 11.3 K/UL (ref 4–11)

## 2022-07-04 PROCEDURE — 71045 X-RAY EXAM CHEST 1 VIEW: CPT

## 2022-07-04 PROCEDURE — 83880 ASSAY OF NATRIURETIC PEPTIDE: CPT

## 2022-07-04 PROCEDURE — 84484 ASSAY OF TROPONIN QUANT: CPT

## 2022-07-04 PROCEDURE — 82803 BLOOD GASES ANY COMBINATION: CPT

## 2022-07-04 PROCEDURE — 93005 ELECTROCARDIOGRAM TRACING: CPT | Performed by: NURSE PRACTITIONER

## 2022-07-04 PROCEDURE — 6370000000 HC RX 637 (ALT 250 FOR IP): Performed by: NURSE PRACTITIONER

## 2022-07-04 PROCEDURE — 96374 THER/PROPH/DIAG INJ IV PUSH: CPT

## 2022-07-04 PROCEDURE — 99285 EMERGENCY DEPT VISIT HI MDM: CPT

## 2022-07-04 PROCEDURE — 80053 COMPREHEN METABOLIC PANEL: CPT

## 2022-07-04 PROCEDURE — 85025 COMPLETE CBC W/AUTO DIFF WBC: CPT

## 2022-07-04 PROCEDURE — 6360000002 HC RX W HCPCS: Performed by: NURSE PRACTITIONER

## 2022-07-04 RX ORDER — FUROSEMIDE 10 MG/ML
60 INJECTION INTRAMUSCULAR; INTRAVENOUS ONCE
Status: COMPLETED | OUTPATIENT
Start: 2022-07-04 | End: 2022-07-04

## 2022-07-04 RX ORDER — METHYLPREDNISOLONE SODIUM SUCCINATE 125 MG/2ML
60 INJECTION, POWDER, LYOPHILIZED, FOR SOLUTION INTRAMUSCULAR; INTRAVENOUS ONCE
Status: DISCONTINUED | OUTPATIENT
Start: 2022-07-04 | End: 2022-07-04

## 2022-07-04 RX ADMIN — FUROSEMIDE 60 MG: 10 INJECTION, SOLUTION INTRAMUSCULAR; INTRAVENOUS at 17:02

## 2022-07-04 RX ADMIN — SODIUM ZIRCONIUM CYCLOSILICATE 10 G: 10 POWDER, FOR SUSPENSION ORAL at 17:42

## 2022-07-04 ASSESSMENT — ENCOUNTER SYMPTOMS
SHORTNESS OF BREATH: 1
COLOR CHANGE: 0
ABDOMINAL PAIN: 0
SORE THROAT: 0
BACK PAIN: 0
VOMITING: 0
DIARRHEA: 0
WHEEZING: 0
NAUSEA: 0
COUGH: 0

## 2022-07-04 ASSESSMENT — PAIN - FUNCTIONAL ASSESSMENT: PAIN_FUNCTIONAL_ASSESSMENT: 0-10

## 2022-07-04 ASSESSMENT — PAIN SCALES - GENERAL: PAINLEVEL_OUTOF10: 8

## 2022-07-04 NOTE — ED PROVIDER NOTES
I did not personally evaluate this patient but I was asked to review the EKG. EKG  The Ekg interpreted by myself in the emergency department in the absence of a cardiologist.  normal sinus rhythm with a rate of 76  Axis is   Normal  QTc is  within an acceptable range  Intervals and Durations are unremarkable. No specific ST-T wave changes appreciated. Changes leads I, 2, aVL not significantly changed when compared to prior EKG dated June 21, 2022  No evidence of acute ischemia.         Rima Barksdale MD  07/04/22 [id number]

## 2022-07-04 NOTE — ED PROVIDER NOTES
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 Gabriele He [facility]) 04/19/2019    EF= 43%    CHF [facility]congestive heart failure) [facility]     Chronic pain     COPD (chronic obstructive pulmonary disease) ([address]     pulmonology Dr. Nitin Molina    Depression     Diabetes mellitus ([address]     borderline    Difficult intravenous access     Emphysema of lung ([address]     ESRD (end stage renal disease) on dialysis ([address]     MWF    Fear of needles     Gastric ulcer     GERD (gastroesophageal reflux disease)     Heart valve problem     bicuspic valve    Hemodialysis patient ([address]     History of spinal fracture     work incident    Hx of blood clots     Bilateral lower extremities; stents in place    Hyperlipidemia     Hypertension     MI (myocardial infarction) ([address] 2019    has had 9 MIs. 2019 was the last    Neuromuscular disorder ([facility]     due to CVA    Numbness and tingling in left arm     from fistula    Pneumonia     PONV (postoperative nausea and vomiting)     Prolonged emergence from general anesthesia     States requires more medication than most people    Sleep apnea     Uses CPAP    Stroke ([facility]     7mm thalamic cva 2017 deficts left side, left side weakness    TIA (transient ischemic attack)     Unspecified diseases of blood and blood-forming organs          Procedure Laterality Date    AORTIC VALVE REPLACEMENT N/A 10/15/2019    TRANSCATHETER AORTIC VALVE REPLACEMENT FEMORAL APPROACH performed by Jesus Patrick MD at [address] [place], [place] [address] Right 7/2/2019    PERITONEAL DIALYSIS CATHETER REMOVAL performed by Nathan Frazier MD at [facility] COLONOSCOPY  2/29/2015    WN    CORONARY ANGIOPLASTY WITH STENT PLACEMENT  05/26/15    CYST REMOVAL  08/14/2013    EXCISION CYSTS, NECK X2 AND ABDOMINAL benign    DIAGNOSTIC CARDIAC CATH LAB PROCEDURE      DIALYSIS EVERY DAY, Disp-90 capsule, R-1Normal      Calcium Acetate, Phos Binder, 667 MG CAPS TAKE 1 CAPSULE BY MOUTH THREE TIMES DAILY WITH MEALS, Disp-90 capsule, R-3Normal      nitroGLYCERIN (NITROSTAT) 0.4 MG SL tablet DISSOLVE 1 TABLET UNDER THE TONGUE AS NEEDED FOR CHEST PAIN EVERY 5 MINUTES UP TO 3 TIMES. IF NO RELIEF CALL 911., Disp-25 tablet, R-10Normal      vitamin D (ERGOCALCIFEROL) 50925 units CAPS capsule TK 1 C PO WEEKLY, R-11Historical Med      Tiotropium Bromide-Olodaterol (STIOLTO RESPIMAT) 2.5-2.5 MCG/ACT AERS Inhale 2 puffs into the lungs daily, Disp-2 Inhaler, R-02 samples given:  Lot #[id number], Exp 7/21 & Lot #[id number], Exp 7/21NO PRINT      Blood Glucose Monitoring Suppl ADAM Disp-1 Device, R-0, NormalUSE AS DIRECTED. Alcohol Swabs PADS Disp-300 each, R-3, NormalUSE AS DIRECTED      albuterol sulfate  (90 Base) MCG/ACT inhaler Inhale 2 puffs into the lungs every 6 hours as needed for Wheezing, Disp-1 Inhaler, R-3Print      ipratropium-albuterol (DUONEB) 0.5-2.5 (3) MG/3ML SOLN nebulizer solution Inhale 3 mLs into the lungs every 6 hours as needed for Shortness of Breath, Disp-360 mL, R-1Print      calcium carbonate (TUMS) 500 MG chewable tablet Take 1 tablet by mouth 3 times daily as needed for Heartburn. Review of Systems:  (2-9 systems needed)  Review of Systems   Constitutional: Negative for chills and fever. HENT: Negative for congestion and sore throat. Respiratory: Positive for shortness of breath. Negative for cough and wheezing. Patient complains of shortness of breath that started about 90 minutes prior to ED arrival, patient states he has a history of diabetes, high blood pressure which is led him to be on dialysis for the past couple years, he is a cigarette smoker that quit about 2 months ago. Cardiovascular: Negative for chest pain. Gastrointestinal: Negative for abdominal pain, diarrhea, nausea and vomiting.    Genitourinary: Negative for difficulty urinating, dysuria, frequency and hematuria. Musculoskeletal: Negative for back pain. Skin: Negative for color change. Neurological: Negative for weakness, numbness and headaches. \"Positives and Pertinent negatives as per HPI\"    Physical Exam:  Physical Exam  Vitals and nursing note reviewed. Constitutional:       Appearance: He is well-developed. He is not diaphoretic. HENT:      Head: Normocephalic. Right Ear: External ear normal.      Left Ear: External ear normal.   Eyes:      General: No scleral icterus. Right eye: No discharge. Left eye: No discharge. Cardiovascular:      Comments: Normal S1 and 2, peripheral pulses are 2+, no peripheral edema observed, he is sinus rhythm on the monitor at 78 bpm during my bedside exam.  Pulmonary:      Effort: Pulmonary effort is normal. No respiratory distress. Comments: Although patient is complaining of feeling short of breath he is awake, alert, airway patent with symmetric rise and fall of chest, lungs are clear anteriorly, diminished posteriorly in the bases, patient is not hypoxic, he does appear to be hyperventilating upon ED arrival.  Initially arrives with CPAP in place, he initially did not want the nursing staff to take it off however, I wanted to see if he became hypoxic so he did not. I do believe some of this is anxiety related. Abdominal:      Palpations: Abdomen is soft. Musculoskeletal:         General: Normal range of motion. Cervical back: Normal range of motion and neck supple. Skin:     General: Skin is warm. Coloration: Skin is not pale. Neurological:      General: No focal deficit present. Mental Status: He is alert and oriented to person, place, and time. GCS: GCS eye subscore is 4. GCS verbal subscore is 5. GCS motor subscore is 6. Comments: Patient is awake, alert, appears to be hyperventilating however, is neurologically intact no focal deficits.    Psychiatric: Behavior: Behavior normal.         MEDICAL DECISION MAKING    Vitals:    Vitals:    07/04/22 1534 07/04/22 1649   BP: (!) 183/97 (!) 171/90   Pulse: 78 76   Resp: 20 19   Temp: 98.1 °F (36.7 °C)    TempSrc: Oral    SpO2: 98% 98%   Weight: 242 lb (109.8 kg)        LABS:  Labs Reviewed   CBC WITH AUTO DIFFERENTIAL - Abnormal; Notable for the following components:       Result Value    WBC 11.3 (*)     RBC 3.95 (*)     Hemoglobin 11.5 (*)     Hematocrit 36.3 (*)     RDW 16.2 (*)     Neutrophils Absolute 9.7 (*)     Lymphocytes Absolute 0.8 (*)     All other components within normal limits   COMPREHENSIVE METABOLIC PANEL W/ REFLEX TO MG FOR LOW K - Abnormal; Notable for the following components:    Sodium 132 (*)     Potassium reflex Magnesium 6.0 (*)     Chloride 91 (*)     CO2 19 (*)     Anion Gap 22 (*)     Glucose 177 (*)     BUN 95 (*)     CREATININE 9.9 (*)     GFR Non- 6 (*)     GFR  7 (*)     ALT <5 (*)     All other components within normal limits    Narrative:     CALL  [facility] tel. [phone],  Chemistry results called to and read back by  NAVJOT Berg, 07/04/2022  16:36, by MARIO   TROPONIN - Abnormal; Notable for the following components:    Troponin 0.10 (*)     All other components within normal limits    Narrative:     CALL  [facility] tel. [phone],  Chemistry results called to and read back by  NAVJOT Berg, 07/04/2022  16:36, by Michele Mansfield - Abnormal; Notable for the following components:    Pro-BNP >70,000 (*)     All other components within normal limits    Narrative:     CALL  [facility] tel. [phone],  Chemistry results called to and read back by  NAVJOT Berg, 07/04/2022  16:36, by Louis Tavares   BLOOD GAS, VENOUS - Abnormal; Notable for the following components:    pH, Blade 7.250 (*)     HCO3, Venous 20.1 (*)     Base Excess, Blade -7.0 (*)     Carboxyhemoglobin 3.0 (*)     All other components within normal limits   SAMPLE POSSIBLE BLOOD BANK TESTING   URINALYSIS WITH REFLEX TO CULTURE        Remainder of labs reviewed and were negative at this time or not returned at the time of this note. RADIOLOGY:   Non-plain film images such as CT, Ultrasound and MRI are read by the radiologist. Sharda PRITCHETT APRN - CNP have directly visualized the radiologic plain film image(s) with the below findings:      Interpretation per the Radiologist below, if available at the time of this note:    XR CHEST PORTABLE   Final Result   Persistent left lower lobe opacification and effusion, likely atelectasis   although infiltrate not excluded. No overt failure. MEDICAL DECISION MAKING / ED COURSE:    Because of high probability of sudden clinical deterioration of the patient's condition and risk of further deterioration, critical care time required my full attention to the patient's condition; which included chart data review, documentation, medication ordering, reviewing the patient's old records, reevaluation patient's cardiac, pulmonary and neurological status. Reevaluation of vital signs. Consultations with ED attending and admitting physician. Ordering, interpreting reviewing diagnostic testing. Therefore a critical care time was 35 minutes of direct attention to the patient's condition did not include time spent on procedures. PROCEDURES:   Procedures    None    Patient was given:  Medications   furosemide (LASIX) injection 60 mg (60 mg IntraVENous Given 7/4/22 1702)   sodium zirconium cyclosilicate (LOKELMA) oral suspension 10 g (10 g Oral Given 7/4/22 1742)   Patient complains of shortness of breath that started about 90 minutes prior to ED arrival, patient states he has a history of diabetes, high blood pressure which is led him to be on dialysis for the past couple years, he is a cigarette smoker that quit about 2 months ago.     I did evaluate the patient's chart patient has a case management plan, it appears that the patient is quite noncompliant and has had multiple hospitalizations due to his noncompliance not adhering to his medical regimen of diabetes and end-stage renal disease with dialysis. In addition, the patient arrived to the ER with CPAP in place, I had the nursing staff take the CPAP off, placed him on 100% nonrebreather. I spoke with him at length about evaluating him without CPAP in place, patient states that he cannot breathe without it, patient was taken off of the EMSs CPAP, he does not become hypoxic, he was placed on a nasal cannula for comfort, his saturations are anywhere from 94 to 98%. He states that he feels like he cannot breathe however, I do believe some of this could be related to anxiety I also believe is related to him needing to have dialysis. I did however order IV access, blood work, chest x-ray and EKG. CBC shows no acute sepsis or significant anemia, he has chronic anemia most likely related to his end-stage renal disease. Metabolic panel shows a gap of 22, potassium is 6, creatinine 9.9 and GFR of 6 this correlates with the patient having end-stage renal disease, needing hemodialysis. This also correlates with his troponin being elevated at 0.10 in addition to his BNP being greater than 70,000. Blood gas pH shows a 7.25, however, it appears to be compensated, chest x-ray shows left lower lobe effusion no obvious infiltrate I do not feel that the patient has pneumonia, he is not tachycardic, he is not febrile, he is actually not even hypoxic, I do believe the patient needs dialysis, I paged his nephrology on-call.     At [facility] I spoke with Dr. Celine Remy, neurology on-call, she agrees with the plan and if the patient hemodialysis facility is willing to dialyze the patient, patient needs to go directly there, she states that because of the hyperkalemia patient can have 1 dose of JENNIFER SANCHEZ. [facility] but otherwise agrees patient needs to go to dialysis, I spoke with the patient about this, he is seems to be frustrated however, I did tell him that he is not requiring additional supplemental oxygen, he does not require the BiPAP after we removed it since has been here, he was given breathing treatment, steroids and Lasix and at this point needs to be dialyzed. Therefore it was agreed that he would get a dose of Lokelma and go directly to dialysis, nursing staff informed the patient and his wife of this. Patient agrees with this plan. The patient tolerated their visit well. I evaluated the patient. The physician was available for consultation as needed. The patient and / or the family were informed of the results of any tests, a time was given to answer questions, a plan was proposed and they agreed with plan. Nursing staff agreed that they would help him with a O2 tank to go to dialysis. At this time no concerns for ACS, pneumonia, pneumothorax, hemothorax, respiratory failure requiring intubation or other emergent etiology, patient verbalized understanding of all discharge instructions, patient's wife came and picked him up and was taking the patient directly to dialysis, patient was stable upon departure. CLINICAL IMPRESSION:  1. Shortness of breath    2. History of COPD    3. ESRD on hemodialysis ([address]    4. Smoker    5.  Supplemental oxygen dependent        DISPOSITION Decision To Discharge 07/04/2022 05:16:00 PM      PATIENT REFERRED TO:  Jose Alberts MD  [address]  [phone]      Follow-up with your family doctor and nephrologist in the next 2 days for reevaluation    [facility]  ED  [facility] [address] [phone]  Go to   If symptoms worsen      DISCHARGE MEDICATIONS:  Discharge Medication List as of 7/4/2022  5:37 PM          DISCONTINUED MEDICATIONS:  Discharge Medication List as of 7/4/2022  5:37 PM                 (Please note the MDM and HPI sections of this note were completed with a voice recognition program.  Efforts were made to edit the dictations but occasionally words are mis-transcribed.)    Electronically signed, JAY Morin CNP,          JAY Morin CNP  07/08/22 1687

## 2022-07-04 NOTE — ED NOTES
Pt not leaving room 1. States his wife is \"half way to hospital from [place]". Pt is oxygen dependent and staying in room until spouse arrives. Spouse is also not brining portable oxygen tank. ED charge nurse aware and okays pt to take ED tank for spouse to transport him to hemodialysis.      Mehreen Person RN  07/04/22 7072

## 2022-07-05 ENCOUNTER — TELEPHONE (OUTPATIENT)
Dept: FAMILY MEDICINE CLINIC | Age: 52
End: 2022-07-05

## 2022-07-05 ENCOUNTER — HOSPITAL ENCOUNTER (OUTPATIENT)
Age: 52
Setting detail: OBSERVATION
Discharge: HOME | End: 2022-07-09
Attending: STUDENT IN AN ORGANIZED HEALTH CARE EDUCATION/TRAINING PROGRAM | Admitting: INTERNAL MEDICINE
Payer: COMMERCIAL

## 2022-07-05 ENCOUNTER — APPOINTMENT (OUTPATIENT)
Dept: GENERAL RADIOLOGY | Age: 52
End: 2022-07-05
Payer: COMMERCIAL

## 2022-07-05 DIAGNOSIS — J96.21 ACUTE ON CHRONIC RESPIRATORY FAILURE WITH HYPOXEMIA (HCC): Primary | ICD-10-CM

## 2022-07-05 DIAGNOSIS — N19 UREMIA: ICD-10-CM

## 2022-07-05 DIAGNOSIS — G89.4 CHRONIC PAIN SYNDROME: ICD-10-CM

## 2022-07-05 DIAGNOSIS — Z99.2 ESRD NEEDING DIALYSIS (HCC): ICD-10-CM

## 2022-07-05 DIAGNOSIS — N18.6 ESRD NEEDING DIALYSIS (HCC): ICD-10-CM

## 2022-07-05 DIAGNOSIS — J81.0 ACUTE PULMONARY EDEMA (HCC): ICD-10-CM

## 2022-07-05 DIAGNOSIS — E87.20 METABOLIC ACIDOSIS: ICD-10-CM

## 2022-07-05 LAB
A/G RATIO: 1.3 (ref 1.1–2.2)
ALBUMIN SERPL-MCNC: 4.5 G/DL (ref 3.4–5)
ALP BLD-CCNC: 98 U/L (ref 40–129)
ALT SERPL-CCNC: 7 U/L (ref 10–40)
ANION GAP SERPL CALCULATED.3IONS-SCNC: 27 MMOL/L (ref 3–16)
AST SERPL-CCNC: 22 U/L (ref 15–37)
BASE EXCESS VENOUS: -6.7 MMOL/L (ref -3–3)
BASOPHILS ABSOLUTE: 0 K/UL (ref 0–0.2)
BASOPHILS RELATIVE PERCENT: 0.1 %
BILIRUB SERPL-MCNC: <0.2 MG/DL (ref 0–1)
BUN BLDV-MCNC: 121 MG/DL (ref 7–20)
CALCIUM SERPL-MCNC: 9.5 MG/DL (ref 8.3–10.6)
CARBOXYHEMOGLOBIN: 1.7 % (ref 0–1.5)
CHLORIDE BLD-SCNC: 89 MMOL/L (ref 99–110)
CO2: 19 MMOL/L (ref 21–32)
CREAT SERPL-MCNC: 11 MG/DL (ref 0.9–1.3)
EKG ATRIAL RATE: 76 BPM
EKG DIAGNOSIS: NORMAL
EKG P AXIS: -11 DEGREES
EKG P-R INTERVAL: 182 MS
EKG Q-T INTERVAL: 394 MS
EKG QRS DURATION: 96 MS
EKG QTC CALCULATION (BAZETT): 443 MS
EKG R AXIS: 19 DEGREES
EKG T AXIS: 102 DEGREES
EKG VENTRICULAR RATE: 76 BPM
EOSINOPHILS ABSOLUTE: 0.1 K/UL (ref 0–0.6)
EOSINOPHILS RELATIVE PERCENT: 0.7 %
GFR AFRICAN AMERICAN: 6
GFR NON-AFRICAN AMERICAN: 5
GLUCOSE BLD-MCNC: 170 MG/DL (ref 70–99)
HCO3 VENOUS: 20.7 MMOL/L (ref 23–29)
HCT VFR BLD CALC: 35.5 % (ref 40.5–52.5)
HEMOGLOBIN: 11.3 G/DL (ref 13.5–17.5)
LYMPHOCYTES ABSOLUTE: 0.8 K/UL (ref 1–5.1)
LYMPHOCYTES RELATIVE PERCENT: 5.3 %
MCH RBC QN AUTO: 29 PG (ref 26–34)
MCHC RBC AUTO-ENTMCNC: 31.8 G/DL (ref 31–36)
MCV RBC AUTO: 91 FL (ref 80–100)
METHEMOGLOBIN VENOUS: 0.2 %
MONOCYTES ABSOLUTE: 0.6 K/UL (ref 0–1.3)
MONOCYTES RELATIVE PERCENT: 3.9 %
NEUTROPHILS ABSOLUTE: 14.1 K/UL (ref 1.7–7.7)
NEUTROPHILS RELATIVE PERCENT: 90 %
O2 SAT, VEN: 69 %
O2 THERAPY: ABNORMAL
PCO2, VEN: 49.2 MMHG (ref 40–50)
PDW BLD-RTO: 16.2 % (ref 12.4–15.4)
PH VENOUS: 7.24 (ref 7.35–7.45)
PLATELET # BLD: 310 K/UL (ref 135–450)
PMV BLD AUTO: 8 FL (ref 5–10.5)
PO2, VEN: 43.9 MMHG (ref 25–40)
POTASSIUM SERPL-SCNC: 5.5 MMOL/L (ref 3.5–5.1)
PRO-BNP: ABNORMAL PG/ML (ref 0–124)
RBC # BLD: 3.9 M/UL (ref 4.2–5.9)
SODIUM BLD-SCNC: 135 MMOL/L (ref 136–145)
TCO2 CALC VENOUS: 22 MMOL/L
TOTAL PROTEIN: 8 G/DL (ref 6.4–8.2)
TROPONIN: 0.09 NG/ML
WBC # BLD: 15.6 K/UL (ref 4–11)

## 2022-07-05 PROCEDURE — 2700000000 HC OXYGEN THERAPY PER DAY

## 2022-07-05 PROCEDURE — 99285 EMERGENCY DEPT VISIT HI MDM: CPT

## 2022-07-05 PROCEDURE — 94761 N-INVAS EAR/PLS OXIMETRY MLT: CPT

## 2022-07-05 PROCEDURE — 84484 ASSAY OF TROPONIN QUANT: CPT

## 2022-07-05 PROCEDURE — 83880 ASSAY OF NATRIURETIC PEPTIDE: CPT

## 2022-07-05 PROCEDURE — 93005 ELECTROCARDIOGRAM TRACING: CPT | Performed by: STUDENT IN AN ORGANIZED HEALTH CARE EDUCATION/TRAINING PROGRAM

## 2022-07-05 PROCEDURE — 80053 COMPREHEN METABOLIC PANEL: CPT

## 2022-07-05 PROCEDURE — 93010 ELECTROCARDIOGRAM REPORT: CPT | Performed by: INTERNAL MEDICINE

## 2022-07-05 PROCEDURE — 85025 COMPLETE CBC W/AUTO DIFF WBC: CPT

## 2022-07-05 PROCEDURE — 82803 BLOOD GASES ANY COMBINATION: CPT

## 2022-07-05 PROCEDURE — 71045 X-RAY EXAM CHEST 1 VIEW: CPT

## 2022-07-05 RX ORDER — IPRATROPIUM BROMIDE AND ALBUTEROL SULFATE 2.5; .5 MG/3ML; MG/3ML
1 SOLUTION RESPIRATORY (INHALATION) ONCE
Status: DISCONTINUED | OUTPATIENT
Start: 2022-07-05 | End: 2022-07-05

## 2022-07-05 RX ORDER — OXYCODONE AND ACETAMINOPHEN 7.5; 325 MG/1; MG/1
1 TABLET ORAL EVERY 6 HOURS PRN
Qty: 120 TABLET | Refills: 0 | Status: ON HOLD | OUTPATIENT
Start: 2022-07-05 | End: 2022-07-21 | Stop reason: SDUPTHER

## 2022-07-05 ASSESSMENT — PAIN - FUNCTIONAL ASSESSMENT: PAIN_FUNCTIONAL_ASSESSMENT: 0-10

## 2022-07-05 ASSESSMENT — PAIN DESCRIPTION - PAIN TYPE: TYPE: CHRONIC PAIN

## 2022-07-05 ASSESSMENT — PAIN DESCRIPTION - LOCATION: LOCATION: BACK

## 2022-07-05 ASSESSMENT — PAIN SCALES - GENERAL: PAINLEVEL_OUTOF10: 9

## 2022-07-05 NOTE — TELEPHONE ENCOUNTER
Caller inquiring if something can be called in for patients SOB. It has been getting more difficult. Stats 99 % at 41/2 liters. Send [facility] in [place] is also inquiring if insurance would cover a pulse ox. Caller stated patient would benefit by having one. Please advise.

## 2022-07-05 NOTE — TELEPHONE ENCOUNTER
Patient is requesting refill on    oxyCODONE-acetaminophen (PERCOCET) 7.5-325 MG per tablet 1 tablet     Send to [facility] in [place] Visit  -  12.10.21 Job Scheuermann  Next Office Visit  -

## 2022-07-05 NOTE — TELEPHONE ENCOUNTER
Last Office Visit  -  12/10/21  Next Office Visit  -  n/a    Last Filled  -  6/3/22  Last UDS -  n/a  Contract -  N/a

## 2022-07-05 NOTE — TELEPHONE ENCOUNTER
Spoke to Rianna Mauricio, she states that he admitted after the call was made that he did not go yesterday. He will go tomorrow. He is scheduled next week for an appt.

## 2022-07-06 LAB
ALBUMIN SERPL-MCNC: 3.8 G/DL (ref 3.4–5)
ANION GAP SERPL CALCULATED.3IONS-SCNC: 23 MMOL/L (ref 3–16)
BASOPHILS ABSOLUTE: 0.1 K/UL (ref 0–0.2)
BASOPHILS RELATIVE PERCENT: 0.8 %
BUN BLDV-MCNC: 120 MG/DL (ref 7–20)
CALCIUM SERPL-MCNC: 8.7 MG/DL (ref 8.3–10.6)
CHLORIDE BLD-SCNC: 95 MMOL/L (ref 99–110)
CO2: 17 MMOL/L (ref 21–32)
CREAT SERPL-MCNC: 11.5 MG/DL (ref 0.9–1.3)
EOSINOPHILS ABSOLUTE: 0.3 K/UL (ref 0–0.6)
EOSINOPHILS RELATIVE PERCENT: 3.4 %
GFR AFRICAN AMERICAN: 6
GFR NON-AFRICAN AMERICAN: 5
GLUCOSE BLD-MCNC: 100 MG/DL (ref 70–99)
GLUCOSE BLD-MCNC: 153 MG/DL (ref 70–99)
GLUCOSE BLD-MCNC: 156 MG/DL (ref 70–99)
GLUCOSE BLD-MCNC: 165 MG/DL (ref 70–99)
GLUCOSE BLD-MCNC: 166 MG/DL (ref 70–99)
GLUCOSE BLD-MCNC: 213 MG/DL (ref 70–99)
GLUCOSE BLD-MCNC: 95 MG/DL (ref 70–99)
HCT VFR BLD CALC: 30.1 % (ref 40.5–52.5)
HEMOGLOBIN: 9.8 G/DL (ref 13.5–17.5)
LYMPHOCYTES ABSOLUTE: 0.8 K/UL (ref 1–5.1)
LYMPHOCYTES RELATIVE PERCENT: 9.3 %
MCH RBC QN AUTO: 29.5 PG (ref 26–34)
MCHC RBC AUTO-ENTMCNC: 32.7 G/DL (ref 31–36)
MCV RBC AUTO: 90.3 FL (ref 80–100)
MONOCYTES ABSOLUTE: 0.5 K/UL (ref 0–1.3)
MONOCYTES RELATIVE PERCENT: 6.2 %
NEUTROPHILS ABSOLUTE: 6.5 K/UL (ref 1.7–7.7)
NEUTROPHILS RELATIVE PERCENT: 80.3 %
PDW BLD-RTO: 16.2 % (ref 12.4–15.4)
PERFORMED ON: ABNORMAL
PERFORMED ON: NORMAL
PHOSPHORUS: 8.7 MG/DL (ref 2.5–4.9)
PLATELET # BLD: 240 K/UL (ref 135–450)
PMV BLD AUTO: 7.8 FL (ref 5–10.5)
POTASSIUM SERPL-SCNC: 4.9 MMOL/L (ref 3.5–5.1)
RBC # BLD: 3.33 M/UL (ref 4.2–5.9)
SARS-COV-2, NAAT: NOT DETECTED
SODIUM BLD-SCNC: 135 MMOL/L (ref 136–145)
WBC # BLD: 8.1 K/UL (ref 4–11)

## 2022-07-06 PROCEDURE — 87635 SARS-COV-2 COVID-19 AMP PRB: CPT

## 2022-07-06 PROCEDURE — 6370000000 HC RX 637 (ALT 250 FOR IP): Performed by: STUDENT IN AN ORGANIZED HEALTH CARE EDUCATION/TRAINING PROGRAM

## 2022-07-06 PROCEDURE — G0378 HOSPITAL OBSERVATION PER HR: HCPCS

## 2022-07-06 PROCEDURE — 85025 COMPLETE CBC W/AUTO DIFF WBC: CPT

## 2022-07-06 PROCEDURE — 2700000000 HC OXYGEN THERAPY PER DAY

## 2022-07-06 PROCEDURE — 90935 HEMODIALYSIS ONE EVALUATION: CPT

## 2022-07-06 PROCEDURE — 94761 N-INVAS EAR/PLS OXIMETRY MLT: CPT

## 2022-07-06 PROCEDURE — 2500000003 HC RX 250 WO HCPCS: Performed by: NURSE PRACTITIONER

## 2022-07-06 PROCEDURE — 1200000000 HC SEMI PRIVATE

## 2022-07-06 PROCEDURE — 6370000000 HC RX 637 (ALT 250 FOR IP): Performed by: NURSE PRACTITIONER

## 2022-07-06 PROCEDURE — 94640 AIRWAY INHALATION TREATMENT: CPT

## 2022-07-06 PROCEDURE — 36415 COLL VENOUS BLD VENIPUNCTURE: CPT

## 2022-07-06 PROCEDURE — 94660 CPAP INITIATION&MGMT: CPT

## 2022-07-06 PROCEDURE — 80069 RENAL FUNCTION PANEL: CPT

## 2022-07-06 RX ORDER — TRAZODONE HYDROCHLORIDE 50 MG/1
100 TABLET ORAL NIGHTLY
Status: DISCONTINUED | OUTPATIENT
Start: 2022-07-06 | End: 2022-07-09 | Stop reason: HOSPADM

## 2022-07-06 RX ORDER — INSULIN LISPRO 100 [IU]/ML
0-6 INJECTION, SOLUTION INTRAVENOUS; SUBCUTANEOUS NIGHTLY
Status: DISCONTINUED | OUTPATIENT
Start: 2022-07-06 | End: 2022-07-09 | Stop reason: HOSPADM

## 2022-07-06 RX ORDER — QUETIAPINE FUMARATE 25 MG/1
50 TABLET, FILM COATED ORAL NIGHTLY
Status: DISCONTINUED | OUTPATIENT
Start: 2022-07-06 | End: 2022-07-06

## 2022-07-06 RX ORDER — INSULIN LISPRO 100 [IU]/ML
0-12 INJECTION, SOLUTION INTRAVENOUS; SUBCUTANEOUS
Status: DISCONTINUED | OUTPATIENT
Start: 2022-07-06 | End: 2022-07-09 | Stop reason: HOSPADM

## 2022-07-06 RX ORDER — TRAZODONE HYDROCHLORIDE 100 MG/1
100 TABLET ORAL NIGHTLY
COMMUNITY
End: 2022-11-01

## 2022-07-06 RX ORDER — QUETIAPINE FUMARATE 25 MG/1
50 TABLET, FILM COATED ORAL NIGHTLY
Status: DISCONTINUED | OUTPATIENT
Start: 2022-07-06 | End: 2022-07-09 | Stop reason: HOSPADM

## 2022-07-06 RX ORDER — IPRATROPIUM BROMIDE AND ALBUTEROL SULFATE 2.5; .5 MG/3ML; MG/3ML
1 SOLUTION RESPIRATORY (INHALATION) EVERY 6 HOURS PRN
Status: DISCONTINUED | OUTPATIENT
Start: 2022-07-06 | End: 2022-07-09 | Stop reason: HOSPADM

## 2022-07-06 RX ORDER — OXYCODONE AND ACETAMINOPHEN 7.5; 325 MG/1; MG/1
1 TABLET ORAL EVERY 6 HOURS PRN
Status: DISCONTINUED | OUTPATIENT
Start: 2022-07-06 | End: 2022-07-06

## 2022-07-06 RX ORDER — OXYCODONE AND ACETAMINOPHEN 7.5; 325 MG/1; MG/1
1 TABLET ORAL EVERY 4 HOURS PRN
Status: DISCONTINUED | OUTPATIENT
Start: 2022-07-06 | End: 2022-07-09 | Stop reason: HOSPADM

## 2022-07-06 RX ORDER — TRAZODONE HYDROCHLORIDE 50 MG/1
100 TABLET ORAL NIGHTLY
Status: DISCONTINUED | OUTPATIENT
Start: 2022-07-06 | End: 2022-07-06

## 2022-07-06 RX ORDER — CLOPIDOGREL BISULFATE 75 MG/1
75 TABLET ORAL DAILY
Status: DISCONTINUED | OUTPATIENT
Start: 2022-07-06 | End: 2022-07-09 | Stop reason: HOSPADM

## 2022-07-06 RX ORDER — ISOSORBIDE DINITRATE 20 MG/1
20 TABLET ORAL 3 TIMES DAILY
Status: DISCONTINUED | OUTPATIENT
Start: 2022-07-06 | End: 2022-07-09 | Stop reason: HOSPADM

## 2022-07-06 RX ORDER — OXYCODONE HYDROCHLORIDE AND ACETAMINOPHEN 5; 325 MG/1; MG/1
2 TABLET ORAL ONCE
Status: COMPLETED | OUTPATIENT
Start: 2022-07-06 | End: 2022-07-06

## 2022-07-06 RX ORDER — CALCIUM CARBONATE 200(500)MG
500 TABLET,CHEWABLE ORAL 3 TIMES DAILY PRN
Status: DISCONTINUED | OUTPATIENT
Start: 2022-07-06 | End: 2022-07-09 | Stop reason: HOSPADM

## 2022-07-06 RX ORDER — DULOXETIN HYDROCHLORIDE 30 MG/1
30 CAPSULE, DELAYED RELEASE ORAL DAILY
Status: DISCONTINUED | OUTPATIENT
Start: 2022-07-06 | End: 2022-07-09 | Stop reason: HOSPADM

## 2022-07-06 RX ORDER — ALBUTEROL SULFATE 90 UG/1
2 AEROSOL, METERED RESPIRATORY (INHALATION) EVERY 6 HOURS PRN
Status: DISCONTINUED | OUTPATIENT
Start: 2022-07-06 | End: 2022-07-09 | Stop reason: HOSPADM

## 2022-07-06 RX ORDER — PRAVASTATIN SODIUM 40 MG
40 TABLET ORAL DAILY
Status: DISCONTINUED | OUTPATIENT
Start: 2022-07-06 | End: 2022-07-09 | Stop reason: HOSPADM

## 2022-07-06 RX ORDER — DEXTROSE MONOHYDRATE 50 MG/ML
100 INJECTION, SOLUTION INTRAVENOUS PRN
Status: DISCONTINUED | OUTPATIENT
Start: 2022-07-06 | End: 2022-07-09 | Stop reason: HOSPADM

## 2022-07-06 RX ORDER — METOPROLOL SUCCINATE 50 MG/1
50 TABLET, EXTENDED RELEASE ORAL 2 TIMES DAILY
Status: DISCONTINUED | OUTPATIENT
Start: 2022-07-06 | End: 2022-07-09 | Stop reason: HOSPADM

## 2022-07-06 RX ORDER — CALCIUM ACETATE 667 MG/1
667 CAPSULE ORAL
Status: DISCONTINUED | OUTPATIENT
Start: 2022-07-06 | End: 2022-07-09 | Stop reason: HOSPADM

## 2022-07-06 RX ORDER — HEPARIN SODIUM 1000 [USP'U]/ML
3600 INJECTION, SOLUTION INTRAVENOUS; SUBCUTANEOUS PRN
Status: DISCONTINUED | OUTPATIENT
Start: 2022-07-06 | End: 2022-07-09 | Stop reason: HOSPADM

## 2022-07-06 RX ADMIN — TRAZODONE HYDROCHLORIDE 100 MG: 50 TABLET ORAL at 21:49

## 2022-07-06 RX ADMIN — METOPROLOL SUCCINATE 50 MG: 50 TABLET, EXTENDED RELEASE ORAL at 02:22

## 2022-07-06 RX ADMIN — ISOSORBIDE DINITRATE 20 MG: 20 TABLET ORAL at 17:33

## 2022-07-06 RX ADMIN — DULOXETINE HYDROCHLORIDE 30 MG: 30 CAPSULE, DELAYED RELEASE ORAL at 08:53

## 2022-07-06 RX ADMIN — TRAZODONE HYDROCHLORIDE 100 MG: 50 TABLET ORAL at 02:57

## 2022-07-06 RX ADMIN — CALCIUM ACETATE 667 MG: 667 CAPSULE ORAL at 09:02

## 2022-07-06 RX ADMIN — ISOSORBIDE DINITRATE 20 MG: 20 TABLET ORAL at 08:53

## 2022-07-06 RX ADMIN — TIOTROPIUM BROMIDE AND OLODATEROL 2 PUFF: 3.124; 2.736 SPRAY, METERED RESPIRATORY (INHALATION) at 08:34

## 2022-07-06 RX ADMIN — APIXABAN 5 MG: 5 TABLET, FILM COATED ORAL at 02:22

## 2022-07-06 RX ADMIN — APIXABAN 5 MG: 5 TABLET, FILM COATED ORAL at 08:53

## 2022-07-06 RX ADMIN — Medication 2 PUFF: at 08:34

## 2022-07-06 RX ADMIN — CLOPIDOGREL BISULFATE 75 MG: 75 TABLET ORAL at 08:53

## 2022-07-06 RX ADMIN — METOPROLOL SUCCINATE 50 MG: 50 TABLET, EXTENDED RELEASE ORAL at 21:49

## 2022-07-06 RX ADMIN — PRAVASTATIN SODIUM 40 MG: 40 TABLET ORAL at 08:53

## 2022-07-06 RX ADMIN — OXYCODONE AND ACETAMINOPHEN 2 TABLET: 5; 325 TABLET ORAL at 00:36

## 2022-07-06 RX ADMIN — OXYCODONE AND ACETAMINOPHEN 1 TABLET: 7.5; 325 TABLET ORAL at 17:33

## 2022-07-06 RX ADMIN — CALCIUM ACETATE 667 MG: 667 CAPSULE ORAL at 17:33

## 2022-07-06 RX ADMIN — INSULIN LISPRO 4 UNITS: 100 INJECTION, SOLUTION INTRAVENOUS; SUBCUTANEOUS at 11:34

## 2022-07-06 RX ADMIN — APIXABAN 5 MG: 5 TABLET, FILM COATED ORAL at 21:49

## 2022-07-06 RX ADMIN — METOPROLOL SUCCINATE 50 MG: 50 TABLET, EXTENDED RELEASE ORAL at 08:53

## 2022-07-06 RX ADMIN — OXYCODONE AND ACETAMINOPHEN 1 TABLET: 7.5; 325 TABLET ORAL at 04:41

## 2022-07-06 RX ADMIN — OXYCODONE AND ACETAMINOPHEN 1 TABLET: 7.5; 325 TABLET ORAL at 09:03

## 2022-07-06 RX ADMIN — ISOSORBIDE DINITRATE 20 MG: 20 TABLET ORAL at 21:49

## 2022-07-06 RX ADMIN — QUETIAPINE FUMARATE 50 MG: 25 TABLET ORAL at 21:49

## 2022-07-06 RX ADMIN — ANTACID TABLETS 500 MG: 500 TABLET, CHEWABLE ORAL at 21:49

## 2022-07-06 RX ADMIN — QUETIAPINE FUMARATE 50 MG: 25 TABLET ORAL at 02:57

## 2022-07-06 RX ADMIN — CALCIUM ACETATE 667 MG: 667 CAPSULE ORAL at 11:35

## 2022-07-06 ASSESSMENT — PAIN DESCRIPTION - PAIN TYPE: TYPE: CHRONIC PAIN

## 2022-07-06 ASSESSMENT — PAIN DESCRIPTION - FREQUENCY: FREQUENCY: CONTINUOUS

## 2022-07-06 ASSESSMENT — PAIN DESCRIPTION - LOCATION
LOCATION: BACK
LOCATION: BACK;HIP
LOCATION: BACK;HIP
LOCATION: BACK
LOCATION: BACK

## 2022-07-06 ASSESSMENT — PAIN SCALES - GENERAL
PAINLEVEL_OUTOF10: 8
PAINLEVEL_OUTOF10: 9
PAINLEVEL_OUTOF10: 10
PAINLEVEL_OUTOF10: 9
PAINLEVEL_OUTOF10: 9

## 2022-07-06 ASSESSMENT — PAIN DESCRIPTION - ORIENTATION
ORIENTATION: LOWER
ORIENTATION: LOWER

## 2022-07-06 ASSESSMENT — PAIN DESCRIPTION - ONSET: ONSET: ON-GOING

## 2022-07-06 ASSESSMENT — PAIN DESCRIPTION - DESCRIPTORS
DESCRIPTORS: TIGHTNESS;THROBBING
DESCRIPTORS: ACHING;TIGHTNESS;THROBBING

## 2022-07-06 NOTE — ED NOTES
Report called to nurse assuming inpatient care and responsibility for patient. All questions answered per protocol. Nurse receiving report expresses no further concerns or questions. Transport to be completed by inpatient nurse.      Luisa Huizar RN  07/06/22 [id number]

## 2022-07-06 NOTE — H&P
Hospital Medicine History & Physical      PCP: Dawit Booker MD    Date of Admission: 7/5/2022    Date of Service: Pt seen/examined on 7/6/2022 and Admitted to Inpatient with expected LOS greater than two midnights due to medical therapy. Chief Complaint:  Shortness of breath    History Of Present Illness:      45 y.o. male , with PMH of ESRD, CHF, HTN, HLD, DM, obesity and tobacco use, who presented to [facility] with shortness of breath. History obtained from the patient and review of EMR. The patient stated he was recently admitted here at [facility] for bradycardia. He stated he has ESRD and receives HD on M-W-F, but missed it d/t being admitted. The patient stated he was discharged home on 7/4/2022 and his wife took him to dialysis, but they would not do it because of the holiday. He presented today to the emergency room d/t worsening shortness of breath. In the emergency room the patients lab work looked better than the previous day, except for his BUN and CR which was  121/11.0, up from 95/9. 9. per EMR, the patient appeared to be in respiratory distress on arrival to the ED, tripoding and tachypneic. He was given 2 breathing treatments in the ED. The patient will be admitted for further evaluation and treatment. Nephrology has been consulted for the am. The patient denied any other associated symptoms as well as any aggravating and/or alleviating factors. At the time of this assessment, the patient was resting comfortably in bed. He currently denies any chest pain, back pain, abdominal pain, shortness of breath, numbness, tingling, N/V/C/D, fever and/or chills.      Past Medical History:          Diagnosis Date    Ambulatory dysfunction     walker for long distances, SOB with distance    Aortic stenosis     echo 2017    Arthritis     hands and hips    Asthma     Bilateral hilar adenopathy syndrome 6/3/2013    CAD (coronary artery disease)     Dr. [facility]) 04/19/2019    EF= 43%    CHF (congestive heart failure) ([facility])     Chronic pain     COPD (chronic obstructive pulmonary disease) ([facility])     pulmonology Dr. Umang Patel    Depression     Diabetes mellitus ([facility]     borderline    Difficult intravenous access     Emphysema of lung ([facility]     ESRD (end stage renal disease) on dialysis ([facility]     MWF    Fear of needles     Gastric ulcer     GERD (gastroesophageal reflux disease)     Heart valve problem     bicuspic valve    Hemodialysis patient ([facility]     History of spinal fracture     work incident    Hx of blood clots     Bilateral lower extremities; stents in place    Hyperlipidemia     Hypertension     MI (myocardial infarction) ([address] 2019    has had 9 MIs. 2019 was the last    Neuromuscular disorder ([facility]     due to CVA    Numbness and tingling in left arm     from fistula    Pneumonia     PONV (postoperative nausea and vomiting)     Prolonged emergence from general anesthesia     States requires more medication than most people    Sleep apnea     Uses CPAP    Stroke ([facility]     7mm thalamic cva 2017 deficts left side, left side weakness    TIA (transient ischemic attack)     Unspecified diseases of blood and blood-forming organs      Past Surgical History:          Procedure Laterality Date    AORTIC VALVE REPLACEMENT N/A 10/15/2019    TRANSCATHETER AORTIC VALVE REPLACEMENT FEMORAL APPROACH performed by Matthew Romo MD at [address] [place], [place] [address]e Right 7/2/2019    PERITONEAL DIALYSIS CATHETER REMOVAL performed by Haleigh Castaneda MD at [facility]  2/29/2015    WN    CORONARY ANGIOPLASTY WITH STENT PLACEMENT  05/26/15    CYST REMOVAL  08/14/2013    EXCISION CYSTS, NECK X2 AND ABDOMINAL benign    DIAGNOSTIC CARDIAC CATH LAB PROCEDURE      DIALYSIS FISTULA CREATION Left 10/30/2017    LEFT BRACHIAL CEPHALIC FISTULA    DIALYSIS FISTULA CREATION Left 3/27/2019    LIGATION  AV FISTULA performed by Nicolle Fuentes MD at [address], COLON, DIAGNOSTIC      IR TUNNELED CATHETER PLACEMENT GREATER THAN 5 YEARS  3/21/2022    IR TUNNELED CATHETER PLACEMENT GREATER THAN 5 YEARS 3/21/2022 [place] [facility]    IR TUNNELED CATHETER PLACEMENT GREATER THAN 5 YEARS  4/21/2022    IR TUNNELED CATHETER PLACEMENT GREATER THAN 5 YEARS 4/21/2022 [facility] SPECIAL PROCEDURES    IR TUNNELED CATHETER PLACEMENT GREATER THAN 5 YEARS  4/26/2022    IR TUNNELED CATHETER PLACEMENT GREATER THAN 5 YEARS 4/26/2022 [place] [facility]URES    IR TUNNELED CATHETER PLACEMENT GREATER THAN 5 YEARS  6/23/2022    IR TUNNELED CATHETER PLACEMENT GREATER THAN 5 YEARS 6/23/2022 [id number] SPECIAL PROCED[facility]    OTHER SURGICAL HISTORY  02/01/2017    laparoscopic cholecystectomy with intraoperative cholangiogram    OTHER SURGICAL HISTORY  2018    PORT PLACEMENT  - vas cath    OTHER SURGICAL HISTORY Bilateral 06/26/2018    laprascopic peritoneal dialysis catheter placement    OTHER SURGICAL HISTORY Right 09/2018    peritoneal dialysis port placed on right side of abdomen    OTHER SURGICAL HISTORY  05/28/2019    PTA/Stenting R External Iliac artery    CT LAP INSERTION TUNNELED INTRAPERITONEAL CATHETER N/A 9/21/2018    LAPAROSCOPIC PERITONEAL DIALYSIS CATHETER REPLACEMENT performed by Maggie Campos MD at [address] [place] [address] Dr JACK 2/24/2022    PERINEAL ABCESS DRAINAGE performed by Maggie Campos MD at [address] ENDOSCOPY  01/06/2016    UPPER GASTROINTESTINAL ENDOSCOPY  01/29/2017    possible candida, otherwise normal appearing    VASCULAR SURGERY  aprx 2 years ago    2 stents placed, each side of groin     Medications Prior to Admission:      Prior to Admission medications    Medication Sig Start Date End Date Taking? Authorizing Provider   oxyCODONE-acetaminophen (PERCOCET) 7.5-325 MG per tablet Take 1 tablet by mouth every 6 hours as needed (pain) for up to 30 days. 7/5/22 8/4/22  Dawit Booker MD   QUEtiapine (SEROQUEL) 50 MG tablet TAKE 1 TABLET BY MOUTH EVERY EVENING 6/30/22   Dawit Booker MD   isosorbide dinitrate (ISORDIL) 20 MG tablet Take 1 tablet by mouth 3 times daily 6/8/22   Carpenter, PA   clopidogrel (PLAVIX) 75 MG tablet Take 1 tablet by mouth daily 5/9/22   JAY Jin - CNP   cyclobenzaprine (FLEXERIL) 10 MG tablet TAKE 1 TABLET BY MOUTH EVERY 8 HOURS AS NEEDED 4/28/22   Dawit Booker MD   pantoprazole (PROTONIX) 40 MG tablet TAKE 1 TABLET BY MOUTH EVERY MORNING BEFORE BREAKFAST 4/28/22   Dawit Booker MD   docusate sodium (DOK) 100 MG capsule Take 1 capsule by mouth daily 4/27/22   Sirena Brown MD   LINZESS 145 MCG capsule TAKE 1 CAPSULE BY MOUTH IN THE MORNING BEFORE BREAKFAST 4/27/22   Dawit Booker MD   DULoxetine (CYMBALTA) 30 MG extended release capsule TAKE 1 CAPSULE BY MOUTH EVERY DAY 3/29/22   Dawit Booker MD   mineral oil liquid Take 30 mLs by mouth daily as needed for Constipation 3/9/22   Dawit Booker MD   sennosides-docusate sodium (SENOKOT-S) 8.6-50 MG tablet Take 1 tablet by mouth daily 3/9/22   Dawit Booker MD   metoprolol succinate (TOPROL XL) 50 MG extended release tablet Take 1 tablet by mouth in the morning and at bedtime 3/3/22   Santino Schaffer MD   apixaban (ELIQUIS) 5 MG TABS tablet Take 1 tablet by mouth 2 times daily 3/3/22   Santino Schaffer MD   pravastatin (PRAVACHOL) 40 MG tablet Take 1 tablet by mouth daily 2/10/22   Keily Garcia APRN - CNP   Continuous Blood Gluc Sensor (DEXCOM G6 SENSOR) MISC Every 10 days 10/5/21   Dawit Booker MD   Continuous Blood Gluc Transmit (DEXCOM G6 TRANSMITTER) MISC 1 each by Does not apply route every 3 months 10/5/21   Dawit Booker MD   Continuous Blood Gluc  ([address]) [address] 1 each by Does not apply route Daily with lunch 10/5/21   MD JEANNIE [facility]-C-Folic Acid (VIRT-CAPS) 1 MG CAPS TAKE 1 CAPSULE BY MOUTH EVERY DAY 9/20/21   Aleida Brownlee MD   Calcium Acetate, Phos Binder, 667 MG CAPS TAKE 1 CAPSULE BY MOUTH THREE TIMES DAILY WITH MEALS 8/12/21   Aleida Brownlee MD   nitroGLYCERIN (NITROSTAT) 0.4 MG SL tablet DISSOLVE 1 TABLET UNDER THE TONGUE AS NEEDED FOR CHEST PAIN EVERY 5 MINUTES UP TO 3 TIMES. IF NO RELIEF CALL 911. 1/7/21   Aleida Brownlee MD   vitamin D (ERGOCALCIFEROL) 95577 units CAPS capsule TK 1 C PO WEEKLY 6/2/19   Historical Provider, MD   Tiotropium Bromide-Olodaterol (STIOLTO RESPIMAT) 2.5-2.5 MCG/ACT AERS Inhale 2 puffs into the lungs daily 5/21/19   Marilee Meneses MD   Blood Glucose Monitoring Suppl ADAM USE AS DIRECTED. 4/25/18   Karoline Sierra MD   Alcohol Swabs PADS USE AS DIRECTED 4/25/18   Karoline Sierra MD   albuterol sulfate  (90 Base) MCG/ACT inhaler Inhale 2 puffs into the lungs every 6 hours as needed for Wheezing 11/8/17   Lamar Riddle MD   ipratropium-albuterol (DUONEB) 0.5-2.5 (3) MG/3ML SOLN nebulizer solution Inhale 3 mLs into the lungs every 6 hours as needed for Shortness of Breath 10/15/17   Harry Barbosa MD   calcium carbonate (TUMS) 500 MG chewable tablet Take 1 tablet by mouth 3 times daily as needed for Heartburn. Historical Provider, MD     Allergies:  Morphine    Social History:      The patient currently lives at home    TOBACCO:   reports that he quit smoking about 2 years ago. His smoking use included cigarettes. He has a 16.50 pack-year smoking history. He has never used smokeless tobacco.  ETOH:   reports previous alcohol use. E-cigarette/Vaping     Questions Responses    E-cigarette/Vaping Use Never User    Start Date     Passive Exposure     Quit Date     Counseling Given     Comments         Family History:      Reviewed and negative in regards to presenting illness/complaint.         Problem Relation Age of Onset    Diabetes Mother     Heart Disease Father     Kidney Disease Sister         stage 4-kidney failure    Cancer Sister     Heart Disease Sister     Obesity Sister     Cancer Sister     Heart Disease Sister     Obesity Sister     Alcohol Abuse Brother      REVIEW OF SYSTEMS COMPLETED:   Pertinent positives as noted in the HPI. All other systems reviewed and negative. PHYSICAL EXAM PERFORMED:    BP (!) 142/112   Pulse 98   Temp 97.9 °F (36.6 °C) (Axillary)   Resp 20   Ht 5' 9\" (1.753 m)   Wt 247 lb 9.6 oz (112.3 kg)   SpO2 98%   BMI 36.56 kg/m²     General appearance:  Pleasant, obese male in no apparent distress, appears stated age and cooperative. HEENT: Pupils equal, round, and reactive to light. Extra ocular muscles intact. Conjunctivae/corneas clear. Neck: Supple, with full range of motion. No jugular venous distention. Trachea midline. Respiratory:  Normal respiratory effort. Clear to auscultation, bilaterally without Rales/Wheezes/Rhonchi. Cardiovascular:  Regular rate and rhythm with normal S1/S2 without murmurs, rubs or gallops. Abdomen: Soft, obese, round  non-tender, non-distended with normal bowel sounds. Musculoskeletal:  No clubbing, cyanosis or edema bilaterally. Full range of motion without deformity. Skin: Skin color, texture, turgor normal.  No significant rashes or lesions. Left CW tunnel cath  Neurologic:  Neurovascularly intact.  Cranial nerves: II-XII intact, grossly non-focal.  Psychiatric:  Alert and oriented, thought content appropriate, normal insight  Capillary Refill: Brisk,3 seconds, normal  Peripheral Pulses: +2 palpable, equal bilaterally     Labs:     Recent Labs     07/04/22 1534 07/05/22 2235   WBC 11.3* 15.6*   HGB 11.5* 11.3*   HCT 36.3* 35.5*    310     Recent Labs     07/04/22 1534 07/05/22 2235   * 135*   K 6.0* 5.5*   CL 91* 89*   CO2 19* 19*   BUN 95* 121*   CREATININE 9.9* 11.0*   CALCIUM 9.6 9.5     Recent Labs     07/04/22 1534 07/05/22 2235   AST 18 22   ALT <5* 7*   BILITOT <0.2 <0.2   ALKPHOS 104 98     Recent Labs     07/04/22 1534 07/05/22 2235   TROPONINI 0.10* 0.09*     Urinalysis:      Lab Results   Component Value Date/Time    NITRU Negative 05/29/2022 12:51 PM    WBCUA 10-20 05/29/2022 12:51 PM    BACTERIA 3+ 05/29/2022 12:51 PM    RBCUA 5-10 05/29/2022 12:51 PM    BLOODU TRACE-INTACT 05/29/2022 12:51 PM    SPECGRAV 1.015 05/29/2022 12:51 PM    GLUCOSEU 100 05/29/2022 12:51 PM     Radiology:     CXR: I have reviewed the CXR with the following interpretation: cardiomegaly with mild vascular congestion. Persistent left basilar opacification and left pleural effusion. EKG:  I have reviewed the EKG with the following interpretation: The Ekg interpreted in the absence of a cardiologist shows Accelerated Junctional rhythm Cannot rule out Inferior infarct , age undetermined. Abnormal ECG. When compared with ECG of 04-JUL-2022 15:51, Junctional rhythm has replaced Sinus rhythm. Minimal criteria for Inferior infarct are now Present    XR CHEST PORTABLE   Final Result   1. Cardiomegaly, with mild vascular congestion   2. Persistent left basilar opacification, and left pleural effusion. Opacification may be on the basis of atelectasis, pneumonia and effusion   3. Improved aeration present within the right lung base, consistent with   resolving atelectasis and or pneumonia           Consults:    None    ASSESSMENT:    Active Hospital Problems    Diagnosis Date Noted    Acute on chronic combined systolic and diastolic heart failure (HCC) [I50.43]      Priority: High    Dyslipidemia [E78.5]      Priority: High    Type 2 diabetes, uncontrolled, with neuropathy ([facility] [E11.40, E11.65] 03/04/2014     Priority: High    HTN (hypertension), benign [I10] 11/14/2013     Priority: Medium    PAF (paroxysmal atrial fibrillation) (HCC) [I48.0]     Fluid overload [E87.70] 11/09/2018    ESRD (end stage renal disease) ([facility] [N18.6] 11/22/2017    Tobacco abuse [Z72.0] 05/21/2017    Obesity (BMI 30-39. 9) [E66.9]      PLAN:    Dyspnea 2/2 fluid overload in patient with ESRD and Acute on chronic combined systolic and diastolic CHF  -BUN//68.1  -nephrology consult - appreciate recommendations in advance  -ProBNP > 32755  -strict I&O  -daily weights  -NEEDS DIALYSIS    Essential HTN  -continue metoprolol and isordil    HLD  -continue pravastatin    Tobacco use  -tobacco cessation  -nicotine patch    Obesity  With Body mass index is 36.6 kg/m². Complicating assessment and treatment. Placing patient at risk for multiple co-morbidities as well as early death and contributing to the patient's presentation. Counseled on weight loss    PAF  -anticoagulated on eliquis  -continue BB    DM2, uncontrolled  -  -hemglobin a1c 6.2 on 6/21/2022  -hold home metformin  -mdssi  -poct ac/hs  -hypoglycemia protocol  -carb control diet    DVT Prophylaxis: eliquis    Diet: No diet orders on file     Code Status: Prior    PT/OT Eval Status: No indication for need at this time    Dispo - 1-2 days pending clinical improvement     Carmelina Chávez, JAY - CNP    Thank you Donna Torres MD for the opportunity to be involved in this patient's care.  If you have any questions or concerns please feel free to contact me at [phone].  --------------------Dr. Gracie Saleem--------------------------------

## 2022-07-06 NOTE — PLAN OF CARE
Problem: Pain  Goal: Verbalizes/displays adequate comfort level or baseline comfort level  7/6/2022 1621 by Simeon Walker RN  Outcome: Adequate for Discharge  7/6/2022 0905 by Simeon Walker RN  Outcome: Progressing  7/6/2022 0402 by Maritza Amado RN  Outcome: Progressing     Problem: Safety - Adult  Goal: Free from fall injury  7/6/2022 1621 by Simeon Walker RN  Outcome: Adequate for Discharge  7/6/2022 0402 by Maritza Amado RN  Outcome: Progressing     Problem: ABCDS Injury Assessment  Goal: Absence of physical injury  7/6/2022 1621 by Simeon Walker RN  Outcome: Adequate for Discharge  7/6/2022 0402 by Maritza Amado RN  Outcome: Progressing  Flowsheets (Taken 7/6/2022 0239)  Absence of Physical Injury: Implement safety measures based on patient assessment     Problem: Chronic Conditions and Co-morbidities  Goal: Patient's chronic conditions and co-morbidity symptoms are monitored and maintained or improved  Outcome: Adequate for Discharge

## 2022-07-06 NOTE — PLAN OF CARE
Problem: Pain  Goal: Verbalizes/displays adequate comfort level or baseline comfort level  Outcome: Progressing     Problem: Safety - Adult  Goal: Free from fall injury  Outcome: Progressing     Problem: ABCDS Injury Assessment  Goal: Absence of physical injury  Outcome: Progressing  Flowsheets (Taken 7/6/2022 0239)  Absence of Physical Injury: Implement safety measures based on patient assessment     Problem: ABCDS Injury Assessment  Goal: Absence of physical injury  Outcome: Progressing  Flowsheets (Taken 7/6/2022 0239)  Absence of Physical Injury: Implement safety measures based on patient assessment     Problem: ABCDS Injury Assessment  Goal: Absence of physical injury  Outcome: Progressing  Flowsheets (Taken 7/6/2022 0239)  Absence of Physical Injury: Implement safety measures based on patient assessment

## 2022-07-06 NOTE — ACP (ADVANCE CARE PLANNING)
Advance Care Planning     General Advance Care Planning (ACP) Conversation    Date of Conversation: 7/5/2022  Conducted with: Patient with Decision Making Capacity    Healthcare Decision Maker:    Primary Decision Maker: Crystal Ann - Spouse - [phone]    Secondary Decision Maker: Julia Heck - Brother/Sister - [phone]  Click here to complete Healthcare Decision Makers including selection of the Healthcare Decision Maker Relationship (ie \"Primary\"). Today we documented Decision Maker(s) consistent with Legal Next of Kin hierarchy. Content/Action Overview:   Has ACP document(s) on file - reflects the patient's care preferences  Reviewed DNR/DNI and patient elects Full Code (Attempt Resuscitation)      Length of Voluntary ACP Conversation in minutes:  <16 minutes (Non-Billable)    Emerson Maldonado RN

## 2022-07-06 NOTE — DISCHARGE INSTR - DIET

## 2022-07-06 NOTE — PROGRESS NOTES
Patient back from dialysis stating, \"I don't feel ready to go home. They did this last time, they sent me home and I came right back in the next day. \" discontinued \"discharge patient\" order per verbal with Dr. Timothy Garcia. VSS, continues to be on 2L o2 (baseline) sating at 95%. Dinner ordered. Blood sugar 95, no insulin required. Patient aware he is staying another night to reassess in the morning. Patient updated family member via cell phone. C/o 9/10 chronic back and hip pain, prn pain medication given per mar. Denies further needs at this time. Call light within reach. A/ox4, calls out appropriately.

## 2022-07-06 NOTE — PROGRESS NOTES
Pt admitted, orient to room and call light. Pt with c/o SOB. Spo2 99% 2L O2. BP elevated. New order for metoprolol. Pt requested trazadone, seroquel and increased pain medication frequency. Elizabeth Lyles NP notified, new orders on chart. Pt denies further needs at this time. Call light in reach.

## 2022-07-06 NOTE — PROGRESS NOTES
07/06/22 0445   NIV Type   $NIV $Daily Charge   Skin Assessment Clean, dry, & intact   NIV Started/Stopped On   Equipment Type V60   Mode CPAP   Mask Type Full face mask   Mask Size Medium   Settings/Measurements   CPAP/EPAP 10 cmH2O   Resp 22   Vt Exhaled 609 mL   Mask Leak (lpm) 44 lpm   Using Accessory Muscles No   Alarm Settings   Alarms On Y

## 2022-07-06 NOTE — FLOWSHEET NOTE
07/06/22 1248 07/06/22 1700   Vital Signs   BP (!) 147/72 125/72   Temp 97.5 °F (36.4 °C) 97.6 °F (36.4 °C)   Heart Rate 68 66   Weight 240 lb 4.8 oz (109 kg) 235 lb 3.7 oz (106.7 kg)   Weight Method Actual;Bed scale Actual;Bed scale     Treatment time: 240min    Net UF: 3000ml    Pre weight: 109k  Post weight: 106.7k  EDW: 106.5k    Access used: [facility] TDC  Access function: Good    Medications or blood products given: None    Regular outpatient schedule: MWF    Summary of response to treatment: Tolerated well. Crit line BV -15.8% and no no refill. Copy of dialysis treatment record placed in chart, to be scanned into EMR.

## 2022-07-06 NOTE — CONSULTS
[facility] Progress Note           Subjective/   45y.o. year old male who we are seeing in consultation for ESKD on HD. HPI:  Readmitted with shortness of breath, uses 2-3 L NC at home which he is currently on. Last had HD as outpatient on 7/1 with 3.4 liters removed, post-weight of 106.6 kg. Missed HD on 7/4. ROS  Intake adequate. No chest pain or abdominal pain. Objective/   GEN:  Chronically ill, BP (!) 173/82   Pulse 74   Temp 97.4 °F (36.3 °C) (Oral)   Resp 18   Ht 5' 9\" (1.753 m)   Wt 240 lb 9.6 oz (109.1 kg)   SpO2 95%   BMI 35.53 kg/m²   HEENT: non-icteric, no JVD  CV: S1, S2 without m/r/g; no LE edema  RESP: CTA B without w/r/r; breathing wnl  ABD: +bs, soft, nt, no hsm  SKIN: warm, no rashes  ACCESS: Left IJ [facility] [place]    Data/  Recent Labs     07/04/22  1534 07/05/22  2235   WBC 11.3* 15.6*   HGB 11.5* 11.3*   HCT 36.3* 35.5*   MCV 92.1 91.0    310     Recent Labs     07/04/22  1534 07/05/22  2235 07/06/22  0701   * 135* 135*   K 6.0* 5.5* 4.9   CL 91* 89* 95*   CO2 19* 19* 17*   GLUCOSE 177* 170* 156*   PHOS  --   --  8.7*   BUN 95* 121* 120*   CREATININE 9.9* 11.0* 11.5*   LABGLOM 6* 5* 5*   GFRAA 7* 6* 6*       Assessment/     # End stage kidney disease - on HD Mon-Wed-Fri     # sCHF/Ischemic cardiomyopathy - EF worse at ~20-25%      # CAD: s/p PATRICIA RCA 1/14/2022; s/p PATRICIA LAD 2015     # Bicuspid AV/severe AS - s/p TAVR 10/2019     # Hypertension - poorly controlled     # Anemia of chronic disease - DAVON with HD     # Hyponatremia - chronic, due to fluid overload, monitor with HD    # Hyperkalemia - better with medical mgmt, monitor with HD       Plan/     - HD today - leaving at lower EDW as outpatient at ~106.5 kg  - DAVON with HD - hold today with adequate levels  - Trend labs, bp's    Okay for discharge after HD if overall better    ____________________________________  Hank Petit MD  [facility]  [email or web address]  Office: [phone]

## 2022-07-06 NOTE — CARE COORDINATION
CASE MANAGEMENT INITIAL ASSESSMENT      Reviewed chart and completed assessment with patient:bedside  Family present: none  Explained Case Management role/services. Primary contact information:[facility] Decision Maker :   Primary Decision Maker: Crystal Ann - Spouse - [phone]    Secondary Decision Maker: Ana María Bryant - Brother/Sister - [phone]          Can this person be reached and be able to respond quickly, such as within a few minutes or hours? Yes    Admit date/status:7/6/22  Diagnosis:pulmonary edema   Is this a Readmission?:  No      Insurance:nilesh Vigilert required for SNF: Yes       3 night stay required: No    Living arrangements, Adls, care needs, prior to admission:lives in trailer with wife, ramp access    Durable Medical Equipment at home:  Walker_rollator_Cane__RTS__ BSC__Shower Chair_x_  02_x 2-3 LNC continuous provided by [employer]._ HHN__ CPAP_x_  BiPap__  Hospital Bed__ W/C___ Other_grab bars____    Services in the home and/or outpatient, prior to admission:? [facility]    Current PCP:Brynn                                Medications Prescription coverage? yes    Transportation needs: none     Dialysis Facility (if applicable)   · [facility]   · Address:  · Dialysis Schedule:[facility]  · Phone:  · Fax:    PT/OT recs:none    Hospital Exemption Notification (HEN):needed for SNF    Barriers to discharge:none    Plan/comments:spoke with patient. Plans on returning home with wife at d/c. Reported he still sometimes drives, if he cant his wife provides transportation. Ambulatory with rollator. Would like to resume HHC with [facility].  Michelle Negrete RN      ECOC on chart for MD signature

## 2022-07-06 NOTE — PLAN OF CARE
Problem: Pain  Goal: Verbalizes/displays adequate comfort level or baseline comfort level  7/6/2022 0905 by Lupe Oconnell RN  Outcome: Progressing  7/6/2022 0402 by Enrrique Benavides RN  Outcome: Progressing

## 2022-07-06 NOTE — DISCHARGE INSTR - COC
Continuity of Care Form    Patient Name: Ronny Carson   :  1970  MRN:  [id number]    Admit date:  2022  Discharge date:  7/10/2022    Code Status Order: Full Code   Advance Directives:      Admitting Physician:  Alma David MD  PCP: Shelton Wilson MD    Discharging Nurse: Maura Frazier, RN  [address] Unit/Room#: 9106/7056-13  Discharging Unit Phone Number: [phone]    Emergency Contact:   Extended Emergency Contact Information  Primary Emergency Contact: Crystal Ann  Address: [address] [facility], [address] [facility] Phone: [phone]  Mobile Phone: [phone]  Relation: Spouse  Secondary Emergency Contact: [facility] Phone: [phone]  Relation: Brother/Sister  Preferred language: English   needed?  No    Past Surgical History:  Past Surgical History:   Procedure Laterality Date    AORTIC VALVE REPLACEMENT N/A 10/15/2019    TRANSCATHETER AORTIC VALVE REPLACEMENT FEMORAL APPROACH performed by Claudia Doyle MD at [facility] Right 2019    PERITONEAL DIALYSIS CATHETER REMOVAL performed by Michelle Mcdaniel MD at [facility]      WN    CORONARY ANGIOPLASTY WITH STENT PLACEMENT  05/26/15    CYST REMOVAL  2013    EXCISION CYSTS, NECK X2 AND ABDOMINAL benign    DIAGNOSTIC CARDIAC CATH LAB PROCEDURE      DIALYSIS FISTULA CREATION Left 10/30/2017    LEFT BRACHIAL CEPHALIC FISTULA    DIALYSIS FISTULA CREATION Left 3/27/2019    LIGATION  AV FISTULA performed by Emiliana Pollack MD at [id number] [place] [address], DIAGNOSTIC      IR TUNNELED CATHETER PLACEMENT GREATER THAN 5 YEARS  3/21/2022    IR TUNNELED CATHETER PLACEMENT GREATER THAN 5 YEARS 3/21/2022 [facility]    IR TUNNELED CATHETER PLACEMENT GREATER THAN 5 YEARS  2022    IR TUNNELED CATHETER PLACEMENT GREATER THAN 5 YEARS 2022 [facility] IR TUNNELED CATHETER PLACEMENT GREATER THAN 5 YEARS  4/26/2022    IR TUNNELED CATHETER PLACEMENT GREATER THAN 5 YEARS 4/26/2022 [facility]    IR TUNNELED CATHETER PLACEMENT GREATER THAN 5 YEARS  6/23/2022    IR TUNNELED CATHETER PLACEMENT GREATER THAN 5 YEARS 6/23/2022 [facility]    OTHER SURGICAL HISTORY  02/01/2017    laparoscopic cholecystectomy with intraoperative cholangiogram    OTHER SURGICAL HISTORY  2018    PORT PLACEMENT  - vas cath    OTHER SURGICAL HISTORY Bilateral 06/26/2018    laprascopic peritoneal dialysis catheter placement    OTHER SURGICAL HISTORY Right 09/2018    peritoneal dialysis port placed on right side of abdomen    OTHER SURGICAL HISTORY  05/28/2019    PTA/Stenting R External Iliac artery    AR LAP INSERTION TUNNELED INTRAPERITONEAL CATHETER N/A 9/21/2018    LAPAROSCOPIC PERITONEAL DIALYSIS CATHETER REPLACEMENT performed by Winifred Mendoza MD at [address] [place] [address] Pkwy 2/24/2022    PERINEAL ABCESS DRAINAGE performed by Winifred Mendoza MD at [facility].  01/06/2016    UPPER GASTROINTESTINAL ENDOSCOPY  01/29/2017    possible candida, otherwise normal appearing    VASCULAR SURGERY  aprx 2 years ago    2 stents placed, each side of groin       Immunization History:   Immunization History   Administered Date(s) Administered    Hepatitis B Adult (Engerix-B) 11/18/2017, 06/13/2018, 07/13/2018    Influenza Virus Vaccine 12/27/2012, 10/07/2014, 11/20/2015, 10/16/2019    Influenza, Intradermal, Quadrivalent, Preservative Free 11/16/2016    Influenza, MDCK Quadv, IM, PF (Flucelvax 2 yrs and older) 10/14/2017, 09/30/2020, 10/05/2021    Influenza, Tawanna Arena, 6 mo and older, IM, PF (Flulaval, Fluarix) 09/15/2018    Influenza, Quadv, IM, PF (6 mo and older Fluzone, Flulaval, Fluarix, and 3 yrs and older Afluria) 10/16/2019    PPD Test 11/09/2017, 11/16/2017, 01/03/2019, 01/06/2020, 01/15/2021 Pneumococcal Conjugate 13-valent (Qmltsdt87) 10/14/2017    Pneumococcal Polysaccharide ([id number]) 10/07/2014, 11/19/2019    Tdap (Boostrix, Adacel) 02/13/2020    Zoster Recombinant (Shingrix) 02/13/2020       Active Problems:  Patient Active Problem List   Diagnosis Code    Sepsis without acute organ dysfunction ([facility]) A41.9    CHF (congestive heart failure) ([facility]) I50.9    Asthma-COPD overlap syndrome ([facility] J44.9    Coronary artery disease of native artery of native heart with stable angina pectoris ([facility]) I25.118    PVD (peripheral vascular disease) ([facility] I73.9    Bicuspid aortic valve Q23.1    Bilateral hilar adenopathy syndrome R59.0    Claudication in peripheral vascular disease ([facility]) I73.9    HTN (hypertension), benign I10    Diabetic neuropathy ([facility]) E11.40    Type 2 diabetes, uncontrolled, with neuropathy ([facility] E11.40, E11.65    Passive smoke exposure Z77.22    Depression with anxiety F41.8    PNA (pneumonia) J18.9    Obesity (BMI 30-39. 9) E66.9    ZAINAB on CPAP G47.33, Z99.89    Degeneration of lumbar or lumbosacral intervertebral disc M51.37    Lumbar radiculopathy M54.16    Lumbosacral spondylosis without myelopathy R66.017    Biliary colic Q21.46    Symptomatic cholelithiasis K80.20    Gastroparesis due to DM ([facility]) E11.43, K31.84    Angina, class IV ([facility]) I20.9    Dyspnea R06.00    Dyslipidemia E78.5    Acute on chronic combined systolic and diastolic heart failure ([facility]) I50.43    Ischemic cardiomyopathy I25.5    Tobacco abuse Z72.0    CVA (cerebral vascular accident) ([facility] I63.9    History of CVA (cerebrovascular accident) Z86.73    Type 2 diabetes mellitus without complication, without long-term current use of insulin ([facility]) E11.9    ZAINAB (obstructive sleep apnea) G47.33    Pleural effusion J90    Chronic anemia D64.9    Nonrheumatic aortic valve stenosis I35.0    Mucus plugging of bronchi T17.500A    Hemodialysis-associated hypotension I95.3    ESRD (end stage renal disease) ([facility]) N18.6    Hypotension due to drugs L68.8    Acute diastolic CHF (congestive heart failure) ([facility]) I50.31    Neuromuscular disorder ([facility]) G70.9    Renovascular hypertension I15.0    Mixed hyperlipidemia E78.2    Cigarette nicotine dependence in remission F17.211    Pulmonary edema J81.1    Fluid overload E87.70    Anemia of chronic disease D63.8    SOB (shortness of breath) on exertion R06.02    Steal syndrome of dialysis vascular access [facility]) T82.898A    Chronic, continuous use of opioids F11.90    Chronic bronchitis ([facility]) J42    Nasal congestion R09.81    Hypercholesteremia E78.00    Bradycardia R00.1    History of transcatheter aortic valve replacement (TAVR) Z95.2    Syncope and collapse R55    PAF (paroxysmal atrial fibrillation) ([facility]) I48.0    Bilateral leg weakness R29.898    GBS (Guillain-Vershire syndrome) ([facility]) G61.0    Sinus pause I45.5    Weakness of both lower extremities R29.898    Sinus bradycardia R00.1    Ataxia R27.0    Peripheral vascular occlusive disease ([facility]) I73.9    Cellulitis of right foot L03.115    Iliac artery occlusion, right ([facility]) I74.5    Cellulitis and abscess of hand L03.119, L02.519    Type 2 diabetes mellitus with hyperglycemia ([facility]) E11.65    Acute encephalopathy G93.40    Acute hypoxemic respiratory failure ([facility]) J96.01    Acute CVA (cerebrovascular accident) ([address] I63.9    Speech problem R47.9    Urinary tract infection with hematuria N39.0, R31.9    Respiratory failure with hypoxia ([facility] J96.91    Acute respiratory failure with hypercapnia ([facility]) J96.02    Acute pulmonary edema ([facility]) J81.0    Grade II diastolic dysfunction P63.05    Shock circulatory ([facility]) R57.9    Smoker F17.200    Normocytic normochromic anemia D64.9    NSTEMI (non-ST elevated myocardial infarction) ([facility]) I21.4    SIRS (systemic inflammatory response syndrome) ([facility]) R65.10    Atrial flutter ([facility]) I48.92    [place]-rectal abscess K61.1    Somnolence R40.0    Pulmonary edema with diastolic CHF, NYHA class 3 ([facility]) I50.30    Respiratory failure - subclavian    Nursing Mobility/ADLs:  Walking   Independent  Transfer  Independent  Bathing  Independent  Dressing  Independent  Toileting   Independent  Feeding  Independent  Med Admin  Independent  Med Delivery   whole    Wound Care Documentation and Therapy:  Wound 08/30/21 Toe (Comment  which one) Right Great Toe (Active)   Number of days: 310       Wound 01/12/22 Pedal Anterior;Right Healing scab from previous blister (per pt), flaky edges (Active)   Number of days: 176       Wound 01/12/22 Toe (Comment  which one) Anterior;Right Scab from old blister (per pt) on 4th toe, flaky edges (Active)   Number of days: 176       Incision 02/24/22 Perineum (Active)   Number of days: 132        Elimination:  Continence: Bowel: Yes  Bladder: Yes  Urinary Catheter: None   Colostomy/Ileostomy/Ileal Conduit: No       Date of Last BM: 7/9/2022  No intake or output data in the 24 hours ending 07/07/22 1338  I/O last 3 completed shifts: In: 360 [P.O.:360]  Out: 48 [Urine:50]    Safety Concerns: At Risk for Falls    Impairments/Disabilities:      None    Nutrition Therapy:  Current Nutrition Therapy:   - Oral Diet:  Renal    Routes of Feeding: Oral  Liquids: No Restrictions  Daily Fluid Restriction: no  Last Modified Barium Swallow with Video (Video Swallowing Test): not done    Treatments at the Time of Hospital Discharge:   Respiratory Treatments: Albuterol inhaler, ipratropium & albuterol nebulizer  Oxygen Therapy:  is on oxygen at 3 L/min per nasal cannula.   Ventilator:    - CPAP   only when sleeping    Rehab Therapies: Physical Therapy and Occupational Therapy  Weight Bearing Status/Restrictions: No weight bearing restrictions  Other Medical Equipment (for information only, NOT a DME order):  walker & CPAP  Other Treatments: n/a    Patient's personal belongings (please select all that are sent with patient):  CLOTHING, PHONE , Glasses    RN SIGNATURE:  Electronically signed by Stuart Osuna RN on 7/9/22 at 1:32 PM

## 2022-07-06 NOTE — PROGRESS NOTES
4 Eyes Admission Assessment     I agree as the admission nurse that 2 RN's have performed a thorough Head to Toe Skin Assessment on the patient. ALL assessment sites listed below have been assessed on admission. Areas assessed by both nurses:  [x]   Head, Face, and Ears   [x]   Shoulders, Back, and Chest  [x]   Arms, Elbows, and Hands   [x]   Coccyx, Sacrum, and Ischium  [x]   Legs, Feet, and Heels    Dry abrasions to right toes, ankle. No other skin issues noted         Does the Patient have Skin Breakdown?   No        Isaac Prevention initiated:   No  Wound Care Orders initiated:  No      [facility] nurse consulted for Pressure Injury (Stage 3,4, Unstageable, DTI, NWPT, and Complex wounds) or Isaac score 18 or lower:  No     Nurse 1 eSignature: Electronically signed by Doyle Lewis RN on 7/6/22 at 2:46 AM EDT    **SHARE this note so that the co-signing nurse is able to place an eSignature**    Nurse 2 eSignature: Electronically signed by Vanessa Riely RN on 7/6/22 at 5:59 AM EDT

## 2022-07-06 NOTE — PROGRESS NOTES
Assessment completed and documented. VSS,uses 2-3L o2 sating between 92-96%, baseline. A/ox4. C/o chronic hip and back pain, given PRN pain medication per MAR. Ambulates with walker. Appetite good. States he still produced urine, urinal at bedside for strict I&Os. Bed locked and in lowest position. Bedside table and call light within reach. Denies further needs at this time. Miriam Szymanski RN 2/15/2022 2:04 PM CST      ----- Message -----  From: Victorino Baez  Sent: 2/15/2022 2:02 PM CST  To: [facility] Card Nurse Msg Pool  Subject: echo results     tech indicated there may be an issue with a \"hole\" so they injected bubble fluid via iv. Please advise if there is an issue.

## 2022-07-07 ENCOUNTER — APPOINTMENT (OUTPATIENT)
Dept: GENERAL RADIOLOGY | Age: 52
End: 2022-07-07
Payer: COMMERCIAL

## 2022-07-07 ENCOUNTER — CARE COORDINATION (OUTPATIENT)
Dept: CASE MANAGEMENT | Age: 52
End: 2022-07-07

## 2022-07-07 LAB
ALBUMIN SERPL-MCNC: 3.6 G/DL (ref 3.4–5)
ANION GAP SERPL CALCULATED.3IONS-SCNC: 18 MMOL/L (ref 3–16)
BUN BLDV-MCNC: 59 MG/DL (ref 7–20)
CALCIUM SERPL-MCNC: 8.8 MG/DL (ref 8.3–10.6)
CHLORIDE BLD-SCNC: 93 MMOL/L (ref 99–110)
CO2: 20 MMOL/L (ref 21–32)
CREAT SERPL-MCNC: 6.8 MG/DL (ref 0.9–1.3)
EKG ATRIAL RATE: 97 BPM
EKG DIAGNOSIS: NORMAL
EKG Q-T INTERVAL: 346 MS
EKG QRS DURATION: 90 MS
EKG QTC CALCULATION (BAZETT): 441 MS
EKG R AXIS: 27 DEGREES
EKG T AXIS: 59 DEGREES
EKG VENTRICULAR RATE: 98 BPM
GFR AFRICAN AMERICAN: 10
GFR NON-AFRICAN AMERICAN: 9
GLUCOSE BLD-MCNC: 109 MG/DL (ref 70–99)
GLUCOSE BLD-MCNC: 139 MG/DL (ref 70–99)
GLUCOSE BLD-MCNC: 178 MG/DL (ref 70–99)
GLUCOSE BLD-MCNC: 185 MG/DL (ref 70–99)
HCT VFR BLD CALC: 30.4 % (ref 40.5–52.5)
HEMOGLOBIN: 9.7 G/DL (ref 13.5–17.5)
MCH RBC QN AUTO: 29.2 PG (ref 26–34)
MCHC RBC AUTO-ENTMCNC: 32 G/DL (ref 31–36)
MCV RBC AUTO: 91.4 FL (ref 80–100)
PDW BLD-RTO: 16.2 % (ref 12.4–15.4)
PERFORMED ON: ABNORMAL
PHOSPHORUS: 6.3 MG/DL (ref 2.5–4.9)
PLATELET # BLD: 213 K/UL (ref 135–450)
PMV BLD AUTO: 7.7 FL (ref 5–10.5)
POTASSIUM SERPL-SCNC: 4.9 MMOL/L (ref 3.5–5.1)
RBC # BLD: 3.32 M/UL (ref 4.2–5.9)
SODIUM BLD-SCNC: 131 MMOL/L (ref 136–145)
WBC # BLD: 8.7 K/UL (ref 4–11)

## 2022-07-07 PROCEDURE — 94660 CPAP INITIATION&MGMT: CPT

## 2022-07-07 PROCEDURE — 93010 ELECTROCARDIOGRAM REPORT: CPT | Performed by: INTERNAL MEDICINE

## 2022-07-07 PROCEDURE — 6360000002 HC RX W HCPCS: Performed by: INTERNAL MEDICINE

## 2022-07-07 PROCEDURE — 2500000003 HC RX 250 WO HCPCS: Performed by: NURSE PRACTITIONER

## 2022-07-07 PROCEDURE — G0378 HOSPITAL OBSERVATION PER HR: HCPCS

## 2022-07-07 PROCEDURE — 36415 COLL VENOUS BLD VENIPUNCTURE: CPT

## 2022-07-07 PROCEDURE — 6370000000 HC RX 637 (ALT 250 FOR IP): Performed by: NURSE PRACTITIONER

## 2022-07-07 PROCEDURE — 2700000000 HC OXYGEN THERAPY PER DAY

## 2022-07-07 PROCEDURE — 96374 THER/PROPH/DIAG INJ IV PUSH: CPT

## 2022-07-07 PROCEDURE — 74018 RADEX ABDOMEN 1 VIEW: CPT

## 2022-07-07 PROCEDURE — 85027 COMPLETE CBC AUTOMATED: CPT

## 2022-07-07 PROCEDURE — 94761 N-INVAS EAR/PLS OXIMETRY MLT: CPT

## 2022-07-07 PROCEDURE — 80069 RENAL FUNCTION PANEL: CPT

## 2022-07-07 RX ORDER — PROCHLORPERAZINE EDISYLATE 5 MG/ML
5 INJECTION INTRAMUSCULAR; INTRAVENOUS EVERY 6 HOURS PRN
Status: DISCONTINUED | OUTPATIENT
Start: 2022-07-07 | End: 2022-07-09 | Stop reason: HOSPADM

## 2022-07-07 RX ADMIN — APIXABAN 5 MG: 5 TABLET, FILM COATED ORAL at 09:10

## 2022-07-07 RX ADMIN — CLOPIDOGREL BISULFATE 75 MG: 75 TABLET ORAL at 09:10

## 2022-07-07 RX ADMIN — OXYCODONE AND ACETAMINOPHEN 1 TABLET: 7.5; 325 TABLET ORAL at 20:25

## 2022-07-07 RX ADMIN — METOPROLOL SUCCINATE 50 MG: 50 TABLET, EXTENDED RELEASE ORAL at 20:25

## 2022-07-07 RX ADMIN — ANTACID TABLETS 500 MG: 500 TABLET, CHEWABLE ORAL at 14:28

## 2022-07-07 RX ADMIN — PROCHLORPERAZINE EDISYLATE 5 MG: 5 INJECTION INTRAMUSCULAR; INTRAVENOUS at 10:49

## 2022-07-07 RX ADMIN — ISOSORBIDE DINITRATE 20 MG: 20 TABLET ORAL at 20:25

## 2022-07-07 RX ADMIN — METOPROLOL SUCCINATE 50 MG: 50 TABLET, EXTENDED RELEASE ORAL at 09:10

## 2022-07-07 RX ADMIN — QUETIAPINE FUMARATE 50 MG: 25 TABLET ORAL at 20:25

## 2022-07-07 RX ADMIN — CALCIUM ACETATE 667 MG: 667 CAPSULE ORAL at 09:10

## 2022-07-07 RX ADMIN — TRAZODONE HYDROCHLORIDE 100 MG: 50 TABLET ORAL at 20:25

## 2022-07-07 RX ADMIN — APIXABAN 5 MG: 5 TABLET, FILM COATED ORAL at 20:25

## 2022-07-07 RX ADMIN — PRAVASTATIN SODIUM 40 MG: 40 TABLET ORAL at 09:10

## 2022-07-07 RX ADMIN — DULOXETINE HYDROCHLORIDE 30 MG: 30 CAPSULE, DELAYED RELEASE ORAL at 09:10

## 2022-07-07 RX ADMIN — ISOSORBIDE DINITRATE 20 MG: 20 TABLET ORAL at 09:10

## 2022-07-07 NOTE — PROGRESS NOTES
Pt is alert and oriented. VSS. Cpap in place. SpO2 100%. Shift assessment completed and documented. Shift assessment completed and documented. Call light within reach. Bed side table within reach. Wheels locked. Bed in lowest position. Pt instructed to call out for assistance. Pt expressesed understanding & calls out appropritately. All care per orders.   Electronically signed by Elsa Subramanian RN on 7/7/2022 at 1:05 AM

## 2022-07-07 NOTE — PROGRESS NOTES
07/07/22 0303   NIV Type   Equipment Type v60   Mode CPAP   Mask Type Full face mask   Mask Size Medium   Settings/Measurements   CPAP/EPAP 10 cmH2O   Resp 19   FiO2  50 %   Vt Exhaled 381 mL   Minute Volume 7.2 Liters   Mask Leak (lpm) 52 lpm   Comfort Level Good   Using Accessory Muscles No   SpO2 98   Patient's Home Machine No   Alarm Settings   Alarms On Y   Low Pressure 6 cmH2O   High Pressure  30 cmH2O   Apnea (secs) 20 secs   RR Low  6   RR High 40 br/min

## 2022-07-07 NOTE — PROGRESS NOTES
Shift assessment completed. Pt A&O x4, VSS. Non skid socks on. Pt on 2 liters of O2 via nasal cannula, pts baseline. Denies any needs at this time. Bed locked and in lowest position. Call light and bedside table within reach. Will continue to monitor.

## 2022-07-07 NOTE — PROGRESS NOTES
[facility] Progress Note           Subjective/   45y.o. year old male who we are seeing in consultation for ESKD on HD. HPI:  Last HD on 7/4 with 3 liters removed, post-weight of 106.7 kg. States shortness of breath persists, remains on 2 L NC.    ROS:  Intake adequate, +weak. Objective/   GEN:  Chronically ill, /70   Pulse 63   Temp 98.1 °F (36.7 °C) (Oral)   Resp 18   Ht 5' 9\" (1.753 m)   Wt 234 lb 5.6 oz (106.3 kg)   SpO2 92%   BMI 34.61 kg/m²   HEENT: non-icteric, no JVD  CV: S1, S2 without m/r/g; no LE edema  RESP: CTA B without w/r/r; breathing wnl  ABD: +bs, soft, nt, no hsm  SKIN: warm, no rashes  ACCESS: Left IJ [facility]    Data/  Recent Labs     07/05/22  2235 07/06/22  1455 07/07/22  0947   WBC 15.6* 8.1 8.7   HGB 11.3* 9.8* 9.7*   HCT 35.5* 30.1* 30.4*   MCV 91.0 90.3 91.4    240 213     Recent Labs     07/05/22 2235 07/06/22  0701 07/07/22  0947   * 135* 131*   K 5.5* 4.9 4.9   CL 89* 95* 93*   CO2 19* 17* 20*   GLUCOSE 170* 156* 178*   PHOS  --  8.7* 6.3*   * 120* 59*   CREATININE 11.0* 11.5* 6.8*   LABGLOM 5* 5* 9*   GFRAA 6* 6* 10*       Assessment/     # End stage kidney disease - on HD Mon-Wed-Fri     # sCHF/Ischemic cardiomyopathy - EF worse at ~20-25%      # CAD: s/p PATRICIA RCA 1/14/2022; s/p PATRICIA LAD 2015     # Bicuspid AV/severe AS - s/p TAVR 10/2019     # Hypertension - poorly controlled     # Anemia of chronic disease - DAVON with HD     # Hyponatremia - chronic, due to fluid overload, monitor with HD     # Hyperkalemia - better with medical mgmt, monitor with HD       Plan/     - HD tomorrow - leaving at lower EDW as outpatient at ~106.5 kg  - Resume DAVON with HD  - Trend labs, bp's    ____________________________________  Carmelina Schilling MD  [facility]  [email or web address]  Office: [phone]

## 2022-07-07 NOTE — PROGRESS NOTES
07/07/22 0009   NIV Type   $NIV $Daily Charge   Equipment Type v60   Mode CPAP   Mask Type Full face mask   Mask Size Medium   Settings/Measurements   CPAP/EPAP 10 cmH2O   Resp 22   FiO2  50 %   Vt Exhaled 371 mL   Minute Volume 8.1 Liters   Mask Leak (lpm) 58 lpm   Comfort Level Good   Using Accessory Muscles No   SpO2 97   Patient's Home Machine No   Alarm Settings   Alarms On Y   Low Pressure 6 cmH2O   High Pressure  30 cmH2O   Apnea (secs) 20 secs   RR Low  6   RR High 40 br/min

## 2022-07-07 NOTE — CARE COORDINATION
CASE MANAGEMENT DISCHARGE SUMMARY      Discharge to: home with [facility]    IMM given: 7/7/22    New Durable Medical Equipment ordered/agency:     Transportation:      Confirmed discharge plan with:     Patient: yes/no     Family:  Yes he will call. Facility/Agency, name:  [facility] faxed per [employer] and to Raina Haq RN, name: Carolee Burrell RN      Spoke with [facility]. Stated d/c cancelled due to nausea. [address] notified d/c cancelled.  Savita Staley RN

## 2022-07-07 NOTE — PROGRESS NOTES
Hospitalist Progress Note      PCP: Tereza Hobbs MD    Date of Admission: 7/5/2022    Chief Complaint: [facility] Course: reviewed     Subjective: notes ongoing nausea       Medications:  Reviewed    Infusion Medications    dextrose       Scheduled Medications    [START ON 7/8/2022] epoetin siddharth-epbx  10,000 Units IntraVENous Q MWF    apixaban  5 mg Oral BID    calcium acetate  667 mg Oral TID WC    clopidogrel  75 mg Oral Daily    DULoxetine  30 mg Oral Daily    isosorbide dinitrate  20 mg Oral TID    linaclotide  145 mcg Oral QAM AC    metoprolol succinate  50 mg Oral BID    pravastatin  40 mg Oral Daily    tiotropium-olodaterol  2 puff Inhalation Daily    insulin lispro  0-12 Units SubCUTAneous TID WC    insulin lispro  0-6 Units SubCUTAneous Nightly    QUEtiapine  50 mg Oral Nightly    traZODone  100 mg Oral Nightly     PRN Meds: prochlorperazine, albuterol sulfate HFA, ipratropium-albuterol, glucose, dextrose bolus **OR** dextrose bolus, glucagon (rDNA), dextrose, oxyCODONE-acetaminophen, heparin (porcine), calcium carbonate    No intake or output data in the 24 hours ending 07/07/22 1444    Physical Exam Performed:    BP (!) 182/91   Pulse 71   Temp 97.6 °F (36.4 °C) (Oral)   Resp 18   Ht 5' 9\" (1.753 m)   Wt 234 lb 5.6 oz (106.3 kg)   SpO2 99%   BMI 34.61 kg/m²     General appearance: No apparent distress, appears stated age and cooperative. HEENT: Pupils equal, round, and reactive to light. Conjunctivae/corneas clear. Neck: Supple, with full range of motion. No jugular venous distention. Trachea midline. Respiratory:  Normal respiratory effort. Clear to auscultation, bilaterally without Rales/Wheezes/Rhonchi. Cardiovascular: Regular rate and rhythm with normal S1/S2 without murmurs, rubs or gallops. Abdomen: Soft, non-tender, non-distended with normal bowel sounds. Musculoskeletal: No clubbing, cyanosis or edema bilaterally.   Full range of motion without deformity. Skin: Skin color, texture, turgor normal.  No rashes or lesions. Neurologic:  Neurovascularly intact without any focal sensory/motor deficits. Cranial nerves: II-XII intact, grossly non-focal.  Psychiatric: Alert and oriented, thought content appropriate, normal insight  Capillary Refill: Brisk,3 seconds, normal   Peripheral Pulses: +2 palpable, equal bilaterally       Labs:   Recent Labs     07/05/22 2235 07/06/22  1455 07/07/22  0947   WBC 15.6* 8.1 8.7   HGB 11.3* 9.8* 9.7*   HCT 35.5* 30.1* 30.4*    240 213     Recent Labs     07/05/22 2235 07/06/22  0701 07/07/22  0947   * 135* 131*   K 5.5* 4.9 4.9   CL 89* 95* 93*   CO2 19* 17* 20*   * 120* 59*   CREATININE 11.0* 11.5* 6.8*   CALCIUM 9.5 8.7 8.8   PHOS  --  8.7* 6.3*     Recent Labs     07/04/22  1534 07/05/22  2235   AST 18 22   ALT <5* 7*   BILITOT <0.2 <0.2   ALKPHOS 104 98     No results for input(s): INR in the last 72 hours. Recent Labs     07/04/22  1534 07/05/22  2235   TROPONINI 0.10* 0.09*       Urinalysis:      Lab Results   Component Value Date/Time    NITRU Negative 05/29/2022 12:51 PM    WBCUA 10-20 05/29/2022 12:51 PM    BACTERIA 3+ 05/29/2022 12:51 PM    RBCUA 5-10 05/29/2022 12:51 PM    BLOODU TRACE-INTACT 05/29/2022 12:51 PM    SPECGRAV 1.015 05/29/2022 12:51 PM    GLUCOSEU 100 05/29/2022 12:51 PM       Radiology:  XR ABDOMEN (KUB) (SINGLE AP VIEW)   Final Result   Several mildly distended small bowel loops in the left mid abdomen, possibly   a focal ileus. Otherwise nonspecific bowel-gas pattern. XR CHEST PORTABLE   Final Result   1. Cardiomegaly, with mild vascular congestion   2. Persistent left basilar opacification, and left pleural effusion. Opacification may be on the basis of atelectasis, pneumonia and effusion   3.  Improved aeration present within the right lung base, consistent with   resolving atelectasis and or pneumonia         XR ABDOMEN (KUB) (SINGLE AP VIEW)    (Results Pending) Assessment/Plan:    Active Hospital Problems    Diagnosis     Acute on chronic combined systolic and diastolic heart failure (HCC) [I50.43]      Priority: High    Dyslipidemia [E78.5]      Priority: High    Type 2 diabetes, uncontrolled, with neuropathy ([facility]) [E11.40, E11.65]      Priority: High    HTN (hypertension), benign [I10]      Priority: Medium    PAF (paroxysmal atrial fibrillation) ([facility]) [I48.0]     Fluid overload [E87.70]     ESRD (end stage renal disease) ([facility]) [N18.6]     Tobacco abuse [Z72.0]     Obesity (BMI 30-39. 9) [E66.9]        Dyspnea 2/2 fluid overload in patient with ESRD and Acute on chronic combined systolic and diastolic CHF  -BUN//61.2  -nephrology consulted - appreciate recommendations in advance  -ProBNP > 57209  -strict I&O  -daily weights   - needing HD     Essential HTN  -continued metoprolol and isordil     Nausea/abd discomfort- noted 7/6 pm, 7/7 xray noted possible ileus  -supportive care    HLD  -continued pravastatin     Tobacco use  -tobacco cessation  -nicotine patch     Obesity  With Body mass index is 36.6 kg/m². Complicating assessment and treatment. Placing patient at risk for multiple co-morbidities as well as early death and contributing to the patient's presentation. Counseled on weight loss     PAF  -anticoagulated on eliquis  -continue BB     DM2, uncontrolled  -BG 170  -hemglobin a1c 6.2 on 6/21/2022  -hold home metformin  -mdssi  -poct ac/hs  -hypoglycemia protocol  -carb control diet    DVT Prophylaxis: on home eliquis  Diet: ADULT DIET; Regular; Low Potassium (Less than 3000 mg/day);  Low Phosphorus (Less than 1000 mg)  Code Status: Full Code    PT/OT Eval Status: not needed    Dispo - repeat xray in am    Harry Barbosa MD

## 2022-07-07 NOTE — PROGRESS NOTES
07/07/22 0813   NIV Type   NIV Started/Stopped On   Equipment Type v60   Mode CPAP   Mask Type Full face mask   Mask Size Medium   Settings/Measurements   CPAP/EPAP 10 cmH2O   Resp 19   FiO2  40 %   Vt Exhaled 390 mL   Mask Leak (lpm) 60 lpm   Comfort Level Good   Using Accessory Muscles No   SpO2 99

## 2022-07-07 NOTE — PLAN OF CARE
Problem: Safety - Adult  Goal: Free from fall injury  Outcome: Progressing     Problem: ABCDS Injury Assessment  Goal: Absence of physical injury  Outcome: Progressing    Pt remains free from falls. Non skid socks on. Bed alarm active for safety. Pt calls out appropriately for assistance. Bed locked and in lowest position. Call light and bedside table within reach. Will continue to monitor.

## 2022-07-08 ENCOUNTER — APPOINTMENT (OUTPATIENT)
Dept: GENERAL RADIOLOGY | Age: 52
End: 2022-07-08
Payer: COMMERCIAL

## 2022-07-08 LAB
ALBUMIN SERPL-MCNC: 3.8 G/DL (ref 3.4–5)
ANION GAP SERPL CALCULATED.3IONS-SCNC: 18 MMOL/L (ref 3–16)
BUN BLDV-MCNC: 80 MG/DL (ref 7–20)
CALCIUM SERPL-MCNC: 8.6 MG/DL (ref 8.3–10.6)
CHLORIDE BLD-SCNC: 94 MMOL/L (ref 99–110)
CO2: 20 MMOL/L (ref 21–32)
CREAT SERPL-MCNC: 8.5 MG/DL (ref 0.9–1.3)
GFR AFRICAN AMERICAN: 8
GFR NON-AFRICAN AMERICAN: 7
GLUCOSE BLD-MCNC: 118 MG/DL (ref 70–99)
GLUCOSE BLD-MCNC: 126 MG/DL (ref 70–99)
GLUCOSE BLD-MCNC: 145 MG/DL (ref 70–99)
GLUCOSE BLD-MCNC: 239 MG/DL (ref 70–99)
HCT VFR BLD CALC: 30.7 % (ref 40.5–52.5)
HEMOGLOBIN: 9.9 G/DL (ref 13.5–17.5)
MCH RBC QN AUTO: 29.4 PG (ref 26–34)
MCHC RBC AUTO-ENTMCNC: 32.2 G/DL (ref 31–36)
MCV RBC AUTO: 91.3 FL (ref 80–100)
PDW BLD-RTO: 16.4 % (ref 12.4–15.4)
PERFORMED ON: ABNORMAL
PHOSPHORUS: 7.2 MG/DL (ref 2.5–4.9)
PLATELET # BLD: 219 K/UL (ref 135–450)
PMV BLD AUTO: 8.1 FL (ref 5–10.5)
POTASSIUM SERPL-SCNC: 5.2 MMOL/L (ref 3.5–5.1)
RBC # BLD: 3.36 M/UL (ref 4.2–5.9)
SODIUM BLD-SCNC: 132 MMOL/L (ref 136–145)
WBC # BLD: 7.1 K/UL (ref 4–11)

## 2022-07-08 PROCEDURE — 90935 HEMODIALYSIS ONE EVALUATION: CPT

## 2022-07-08 PROCEDURE — 6360000002 HC RX W HCPCS: Performed by: INTERNAL MEDICINE

## 2022-07-08 PROCEDURE — 74018 RADEX ABDOMEN 1 VIEW: CPT

## 2022-07-08 PROCEDURE — 2700000000 HC OXYGEN THERAPY PER DAY

## 2022-07-08 PROCEDURE — 6360000002 HC RX W HCPCS

## 2022-07-08 PROCEDURE — 94761 N-INVAS EAR/PLS OXIMETRY MLT: CPT

## 2022-07-08 PROCEDURE — 96376 TX/PRO/DX INJ SAME DRUG ADON: CPT

## 2022-07-08 PROCEDURE — 94660 CPAP INITIATION&MGMT: CPT

## 2022-07-08 PROCEDURE — 85027 COMPLETE CBC AUTOMATED: CPT

## 2022-07-08 PROCEDURE — 96375 TX/PRO/DX INJ NEW DRUG ADDON: CPT

## 2022-07-08 PROCEDURE — 6370000000 HC RX 637 (ALT 250 FOR IP): Performed by: NURSE PRACTITIONER

## 2022-07-08 PROCEDURE — 94640 AIRWAY INHALATION TREATMENT: CPT

## 2022-07-08 PROCEDURE — 36415 COLL VENOUS BLD VENIPUNCTURE: CPT

## 2022-07-08 PROCEDURE — 80069 RENAL FUNCTION PANEL: CPT

## 2022-07-08 PROCEDURE — G0378 HOSPITAL OBSERVATION PER HR: HCPCS

## 2022-07-08 PROCEDURE — 2500000003 HC RX 250 WO HCPCS: Performed by: NURSE PRACTITIONER

## 2022-07-08 RX ORDER — LOPERAMIDE HYDROCHLORIDE 2 MG/1
2 CAPSULE ORAL 4 TIMES DAILY PRN
Status: DISCONTINUED | OUTPATIENT
Start: 2022-07-08 | End: 2022-07-09 | Stop reason: HOSPADM

## 2022-07-08 RX ORDER — HEPARIN SODIUM 1000 [USP'U]/ML
INJECTION, SOLUTION INTRAVENOUS; SUBCUTANEOUS
Status: DISPENSED
Start: 2022-07-08 | End: 2022-07-09

## 2022-07-08 RX ADMIN — CLOPIDOGREL BISULFATE 75 MG: 75 TABLET ORAL at 08:45

## 2022-07-08 RX ADMIN — PRAVASTATIN SODIUM 40 MG: 40 TABLET ORAL at 08:45

## 2022-07-08 RX ADMIN — TIOTROPIUM BROMIDE AND OLODATEROL 2 PUFF: 3.124; 2.736 SPRAY, METERED RESPIRATORY (INHALATION) at 08:50

## 2022-07-08 RX ADMIN — APIXABAN 5 MG: 5 TABLET, FILM COATED ORAL at 21:54

## 2022-07-08 RX ADMIN — CALCIUM ACETATE 667 MG: 667 CAPSULE ORAL at 08:45

## 2022-07-08 RX ADMIN — ISOSORBIDE DINITRATE 20 MG: 20 TABLET ORAL at 08:45

## 2022-07-08 RX ADMIN — CALCIUM ACETATE 667 MG: 667 CAPSULE ORAL at 13:13

## 2022-07-08 RX ADMIN — METOPROLOL SUCCINATE 50 MG: 50 TABLET, EXTENDED RELEASE ORAL at 21:54

## 2022-07-08 RX ADMIN — ISOSORBIDE DINITRATE 20 MG: 20 TABLET ORAL at 13:13

## 2022-07-08 RX ADMIN — LOPERAMIDE HYDROCHLORIDE 2 MG: 2 CAPSULE ORAL at 21:53

## 2022-07-08 RX ADMIN — TRAZODONE HYDROCHLORIDE 100 MG: 50 TABLET ORAL at 21:54

## 2022-07-08 RX ADMIN — EPOETIN ALFA-EPBX 10000 UNITS: 10000 INJECTION, SOLUTION INTRAVENOUS; SUBCUTANEOUS at 14:21

## 2022-07-08 RX ADMIN — PROCHLORPERAZINE EDISYLATE 5 MG: 5 INJECTION INTRAMUSCULAR; INTRAVENOUS at 08:45

## 2022-07-08 RX ADMIN — METOPROLOL SUCCINATE 50 MG: 50 TABLET, EXTENDED RELEASE ORAL at 08:45

## 2022-07-08 RX ADMIN — APIXABAN 5 MG: 5 TABLET, FILM COATED ORAL at 08:45

## 2022-07-08 RX ADMIN — DULOXETINE HYDROCHLORIDE 30 MG: 30 CAPSULE, DELAYED RELEASE ORAL at 08:45

## 2022-07-08 RX ADMIN — OXYCODONE AND ACETAMINOPHEN 1 TABLET: 7.5; 325 TABLET ORAL at 21:54

## 2022-07-08 RX ADMIN — QUETIAPINE FUMARATE 50 MG: 25 TABLET ORAL at 21:54

## 2022-07-08 RX ADMIN — ISOSORBIDE DINITRATE 20 MG: 20 TABLET ORAL at 21:53

## 2022-07-08 NOTE — PROGRESS NOTES
07/08/22 0018   NIV Type   $NIV $Daily Charge   NIV Started/Stopped On   Equipment Type V60   Mode CPAP   Mask Type Full face mask   Mask Size Medium   Settings/Measurements   CPAP/EPAP 10 cmH2O   Resp 18   FiO2  40 %   Vt Exhaled 382 mL   Minute Volume 8 Liters   Mask Leak (lpm) 24 lpm   Comfort Level Good   Using Accessory Muscles No   Alarm Settings   Alarms On Y

## 2022-07-08 NOTE — CONSULTS
Gastroenterology Consult Note    Patient:   Anamika Gordillo   YOB: 1970   Facility:   [facility]   Referring/PCP: Arthuro Duane, MD  Date:     7/8/2022  Consultant:   Abena Low MD, MD    Subjective: This 45 y.o. male was admitted 7/5/2022 with a diagnosis of \"Acute pulmonary edema ([address] [M95.7]  Metabolic acidosis [Y55.0]  Uremia [N19]  Fluid overload [E87.70]  ESRD needing dialysis ([address] [N18.6, Z99.2]  Acute on chronic respiratory failure with hypoxemia ([facility] [J96.21]\" and is seen in consultation regarding \"dysphagia\". Information was obtained from interview of the patient, examination of the patient, and review of records. I did update the past medical, surgical, social and / or family history. Dysphagia moderate in esophagus chronic assoc w ? GERD    Current status  Present  Diet Order: ADULT DIET; Regular; Low Potassium (Less than 3000 mg/day); Low Phosphorus (Less than 1000 mg)   The patient has also experienced no N/V, hematochezia/melena, diarrhea/const.      Prior to Admission medications    Medication Sig Start Date End Date Taking? Authorizing Provider   traZODone (DESYREL) 100 MG tablet Take 100 mg by mouth nightly   Yes Historical Provider, MD   oxyCODONE-acetaminophen (PERCOCET) 7.5-325 MG per tablet Take 1 tablet by mouth every 6 hours as needed (pain) for up to 30 days.  7/5/22 8/4/22  Arthuro Duane, MD   QUEtiapine (SEROQUEL) 50 MG tablet TAKE 1 TABLET BY MOUTH EVERY EVENING 6/30/22   Arthuro Duane, MD   isosorbide dinitrate (ISORDIL) 20 MG tablet Take 1 tablet by mouth 3 times daily 6/8/22   JASE Osorio   clopidogrel (PLAVIX) 75 MG tablet Take 1 tablet by mouth daily 5/9/22   JAY Lpoez - CNP   cyclobenzaprine (FLEXERIL) 10 MG tablet TAKE 1 TABLET BY MOUTH EVERY 8 HOURS AS NEEDED 4/28/22   Arthuro Duane, MD   pantoprazole (PROTONIX) 40 MG tablet TAKE 1 TABLET BY MOUTH EVERY MORNING BEFORE BREAKFAST 4/28/22   Brooklyn Tariq MD [place]   docusate sodium (DOK) 100 MG capsule Take 1 capsule by mouth daily 4/27/22   Lazarus Sayers, MD   LINZESS 145 MCG capsule TAKE 1 CAPSULE BY MOUTH IN THE MORNING BEFORE BREAKFAST 4/27/22   Lanette Perez MD   DULoxetine (CYMBALTA) 30 MG extended release capsule TAKE 1 CAPSULE BY MOUTH EVERY DAY 3/29/22   Lanette Perez MD   mineral oil liquid Take 30 mLs by mouth daily as needed for Constipation 3/9/22   Lanette Perez MD   sennosides-docusate sodium (SENOKOT-S) 8.6-50 MG tablet Take 1 tablet by mouth daily 3/9/22   Lanette Perez MD   metoprolol succinate (TOPROL XL) 50 MG extended release tablet Take 1 tablet by mouth in the morning and at bedtime 3/3/22   Heather Wilson MD   apixaban (ELIQUIS) 5 MG TABS tablet Take 1 tablet by mouth 2 times daily 3/3/22   Heather Wilson MD   pravastatin (PRAVACHOL) 40 MG tablet Take 1 tablet by mouth daily 2/10/22   Merle Lips, APRN - CNP   Continuous Blood Gluc Sensor (DEXCOM G6 SENSOR) MISC Every 10 days 10/5/21   Lanette Perez MD   Continuous Blood Gluc Transmit (DEXCOM G6 TRANSMITTER) MISC 1 each by Does not apply route every 3 months 10/5/21   Lanette Perez MD   Continuous Blood Gluc  (Slava Shark ) [address] 1 each by Does not apply route Daily with lunch 10/5/21   Lanette Perez MD   B Complex-C-Folic Acid (VIRT-CAPS) 1 MG CAPS TAKE 1 CAPSULE BY MOUTH EVERY DAY 9/20/21   Lanette Perez MD   Calcium Acetate, Phos Binder, 667 MG CAPS TAKE 1 CAPSULE BY MOUTH THREE TIMES DAILY WITH MEALS 8/12/21   Lanette Perez MD   nitroGLYCERIN (NITROSTAT) 0.4 MG SL tablet DISSOLVE 1 TABLET UNDER THE TONGUE AS NEEDED FOR CHEST PAIN EVERY 5 MINUTES UP TO 3 TIMES.  IF NO RELIEF CALL 911. 1/7/21   Lanette Perez MD   vitamin D (ERGOCALCIFEROL) 24245 units CAPS capsule TK 1 C PO WEEKLY 6/2/19   Historical Provider, MD   Tiotropium Bromide-Olodaterol (STIOLTO RESPIMAT) 2.5-2.5 MCG/ACT AERS Inhale 2 puffs into the lungs daily 5/21/19   Hernando Hector MD Blood Glucose Monitoring Suppl ADAM USE AS DIRECTED. 4/25/18   Isaac Foote MD   Alcohol Swabs PADS USE AS DIRECTED 4/25/18   Isaac Foote MD   albuterol sulfate  (90 Base) MCG/ACT inhaler Inhale 2 puffs into the lungs every 6 hours as needed for Wheezing 11/8/17   Kalyn Scott MD   ipratropium-albuterol (DUONEB) 0.5-2.5 (3) MG/3ML SOLN nebulizer solution Inhale 3 mLs into the lungs every 6 hours as needed for Shortness of Breath 10/15/17   Heather Wilson MD   calcium carbonate (TUMS) 500 MG chewable tablet Take 1 tablet by mouth 3 times daily as needed for Heartburn. Historical Provider, MD      Scheduled Medications:    epoetin siddharth-epbx  10,000 Units IntraVENous Q MWF    apixaban  5 mg Oral BID    calcium acetate  667 mg Oral TID WC    clopidogrel  75 mg Oral Daily    DULoxetine  30 mg Oral Daily    isosorbide dinitrate  20 mg Oral TID    linaclotide  145 mcg Oral QAM AC    metoprolol succinate  50 mg Oral BID    pravastatin  40 mg Oral Daily    tiotropium-olodaterol  2 puff Inhalation Daily    insulin lispro  0-12 Units SubCUTAneous TID WC    insulin lispro  0-6 Units SubCUTAneous Nightly    QUEtiapine  50 mg Oral Nightly    traZODone  100 mg Oral Nightly     Infusions:    dextrose       PRN Medications: prochlorperazine, albuterol sulfate HFA, ipratropium-albuterol, glucose, dextrose bolus **OR** dextrose bolus, glucagon (rDNA), dextrose, oxyCODONE-acetaminophen, heparin (porcine), calcium carbonate  Allergies:    Allergies   Allergen Reactions    Morphine Nausea And Vomiting       Past Medical History:   Diagnosis Date    Ambulatory dysfunction     walker for long distances, SOB with distance    Aortic stenosis     echo 2017    Arthritis     hands and hips    Asthma     Bilateral hilar adenopathy syndrome 6/3/2013    CAD (coronary artery disease)     Dr. Anushka Guillen [facility]) 04/19/2019    EF= 43%    CHF (congestive heart failure) ([facility]  Chronic pain     COPD (chronic obstructive pulmonary disease) [facility])     pulmonology Dr. Cheyenne Ahn    Depression     Diabetes mellitus ([facility]     borderline    Difficult intravenous access     Emphysema of lung ([facility]     ESRD (end stage renal disease) on dialysis ([address]     MWF    Fear of needles     Gastric ulcer     GERD (gastroesophageal reflux disease)     Heart valve problem     bicuspic valve    Hemodialysis patient ([facility]     History of spinal fracture     work incident    Hx of blood clots     Bilateral lower extremities; stents in place    Hyperlipidemia     Hypertension     MI (myocardial infarction) ([address] 2019    has had 9 MIs. 2019 was the last    Neuromuscular disorder ([facility]     due to CVA    Numbness and tingling in left arm     from fistula    Pneumonia     PONV (postoperative nausea and vomiting)     Prolonged emergence from general anesthesia     States requires more medication than most people    Sleep apnea     Uses CPAP    Stroke ([facility]     7mm thalamic cva 2017 deficts left side, left side weakness    TIA (transient ischemic attack)     Unspecified diseases of blood and blood-forming organs      Past Surgical History:   Procedure Laterality Date    AORTIC VALVE REPLACEMENT N/A 10/15/2019    TRANSCATHETER AORTIC VALVE REPLACEMENT FEMORAL APPROACH performed by Saba Sofia MD at [address] [place], [place] [address] Right 7/2/2019    PERITONEAL DIALYSIS CATHETER REMOVAL performed by Ofe Huber MD at [facility] COLONOSCOPY  2/29/2015    WN    CORONARY ANGIOPLASTY WITH STENT PLACEMENT  05/26/15    CYST REMOVAL  08/14/2013    EXCISION CYSTS, NECK X2 AND ABDOMINAL benign    DIAGNOSTIC CARDIAC CATH LAB PROCEDURE      DIALYSIS FISTULA CREATION Left 10/30/2017    LEFT BRACHIAL CEPHALIC FISTULA    DIALYSIS FISTULA CREATION Left 3/27/2019    LIGATION  AV FISTULA performed by Wagner Viera MD at [address] [facility] [place], DIAGNOSTIC      IR TUNNELED CATHETER PLACEMENT GREATER THAN 5 YEARS  3/21/2022    IR TUNNELED CATHETER PLACEMENT GREATER THAN 5 YEARS 3/21/2022 [facility] PROCEDURES    IR TUNNELED CATHETER PLACEMENT GREATER THAN 5 YEARS  2022    IR TUNNELED CATHETER PLACEMENT GREATER THAN 5 YEARS 2022 [facility] PROCEDURES    IR TUNNELED CATHETER PLACEMENT GREATER THAN 5 YEARS  2022    IR TUNNELED CATHETER PLACEMENT GREATER THAN 5 YEARS 2022 [facility] PROCED[facility]    IR TUNNELED CATHETER PLACEMENT GREATER THAN 5 YEARS  2022    IR TUNNELED CATHETER PLACEMENT GREATER THAN 5 YEARS 2022 [facility] PROCEDURES    OTHER SURGICAL HISTORY  2017    laparoscopic cholecystectomy with intraoperative cholangiogram    OTHER SURGICAL HISTORY  2018    PORT PLACEMENT  - vas cath    OTHER SURGICAL HISTORY Bilateral 2018    laprascopic peritoneal dialysis catheter placement    OTHER SURGICAL HISTORY Right 2018    peritoneal dialysis port placed on right side of abdomen    OTHER SURGICAL HISTORY  2019    PTA/Stenting R External Iliac artery    AK LAP INSERTION TUNNELED INTRAPERITONEAL CATHETER N/A 2018    LAPAROSCOPIC PERITONEAL DIALYSIS CATHETER REPLACEMENT performed by Maggie Campos MD at [address] N/A 2022    PERINEAL ABCESS DRAINAGE performed by Maggie Campos MD at [address] [facility] ENDOSCOPY  2016    UPPER GASTROINTESTINAL ENDOSCOPY  2017    possible candida, otherwise normal appearing    VASCULAR SURGERY  aprx 2 years ago    2 stents placed, each side of groin       Social:   Social History     Tobacco Use    Smoking status: Former Smoker     Packs/day: 0.50     Years: 33.00     Pack years: 16.50     Types: Cigarettes     Quit date: 2020     Years since quittin.2    Smokeless tobacco: Never Used    Tobacco comment: States quit 2021   Substance Use Topics    Alcohol use: Not Currently     Alcohol/week: 0.0 standard drinks     Comment: occ     Family:   Family History   Problem Relation Age of Onset    Diabetes Mother     Heart Disease Father     Kidney Disease Sister         stage 4-kidney failure    Cancer Sister     Heart Disease Sister     Obesity Sister     Cancer Sister     Heart Disease Sister     Obesity Sister     Alcohol Abuse Brother        ROS: As in HPI above    Objective:   Vital Signs:  Temp (24hrs), Av °F (36.7 °C), Min:97.3 °F (36.3 °C), Max:98.6 °F (37 °C)     Systolic (95AUL), ONB:878 , Min:150 , CFZ:852      Diastolic (61WTE), ZSA:54, Min:79, Max:115     Pulse  Av  Min: 64  Max: 70  BP (!) 154/89   Pulse 70   Temp 98.6 °F (37 °C) (Oral)   Resp 16   Ht 5' 9\" (1.753 m)   Wt 234 lb 5.6 oz (106.3 kg)   SpO2 94%   BMI 34.61 kg/m²      Physical Exam:   General appearance: No apparent distress, appears stated age and cooperative. HEENT: Pupils equal, round, and reactive to light. Conjunctivae/corneas clear. Neck: Supple, with full range of motion. No jugular venous distention. Trachea midline. Respiratory:  Normal respiratory effort. Clear to auscultation, bilaterally without Rales/Wheezes/Rhonchi. Cardiovascular: Regular rate and rhythm with normal S1/S2 without murmurs, rubs or gallops. Abdomen: Soft, non-tender, non-distended with normal bowel sounds. Musculoskeletal: No clubbing, cyanosis or edema bilaterally. Full range of motion without deformity. Skin: Skin color, texture, turgor normal.  No rashes or lesions. Neurologic:  Neurovascularly intact without any focal sensory/motor deficits.  Cranial nerves: II-XII intact, grossly non-focal.  Psychiatric:  Alert and oriented, thought content appropriate, normal insight    Lab and Imaging Review   Recent Labs     22  22322  0701 22  1455 22  0947 22  0517   WBC 15.6*   < >  --  8.1 8.7 7.1   HGB 11.3*  --   --  9.8* 9.7* 9.9*   MCV 91.0   < > --  90.3 91.4 91.3      < >  --  240 213 219   *   < > 135*  --  131* 132*   K 5.5*   < > 4.9  --  4.9 5.2*   CL 89*   < > 95*  --  93* 94*   CO2 19*   < > 17*  --  20* 20*   *   < > 120*  --  59* 80*   CREATININE 11.0*   < > 11.5*  --  6.8* 8.5*   GLUCOSE 170*   < > 156*  --  178* 126*   CALCIUM 9.5   < > 8.7  --  8.8 8.6   PROT 8.0  --   --   --   --   --    LABALBU 4.5   < > 3.8  --  3.6 3.8   AST 22  --   --   --   --   --    ALT 7*  --   --   --   --   --    ALKPHOS 98  --   --   --   --   --    BILITOT <0.2  --   --   --   --   --     < > = values in this interval not displayed.        Assessment:     Patient Active Problem List    Diagnosis Date Noted    Hypotension due to drugs 11/25/2017    Acute on chronic combined systolic and diastolic heart failure ([facility]     Ischemic cardiomyopathy     Dyslipidemia     Type 2 diabetes, uncontrolled, with neuropathy ([facility] 03/04/2014    Bicuspid aortic valve 06/03/2013    CHF (congestive heart failure) ([facility] 05/14/2013    Coronary artery disease of native artery of native heart with stable angina pectoris ([facility] 05/14/2013    PVD (peripheral vascular disease) ([facility] 05/14/2013    Altered mental status     Hypoglycemia 05/29/2022    Morbid obesity with BMI of 40.0-44.9, adult ([facility] 04/26/2022    Former smoker 04/19/2022    Cardiomyopathy ([facility] 04/19/2022    History of transcatheter aortic valve replacement (TAVR) 10/19/2019    HTN (hypertension), benign 11/14/2013    Asthma-COPD overlap syndrome ([facility] 05/14/2013    Respiratory failure ([facility] 04/18/2022    Pulmonary edema with diastolic CHF, NYHA class 3 ([address] 03/17/2022    Liberty-rectal abscess     Somnolence     Atrial flutter ([address]     SIRS (systemic inflammatory response syndrome) ([facility] 02/21/2022    NSTEMI (non-ST elevated myocardial infarction) ([facility] 01/12/2022    Acute respiratory failure with hypercapnia ([facility] 11/30/2021    [facility] pulmonary edema [facility] 11/30/2021    Grade II diastolic dysfunction 54/54/1611    Shock circulatory ([address] 11/30/2021    Smoker 11/30/2021    Normocytic normochromic anemia 11/30/2021    Respiratory failure with hypoxia ([address] 11/29/2021    Speech problem     Urinary tract infection with hematuria     Acute CVA (cerebrovascular accident) ([address] 09/07/2021    Acute hypoxemic respiratory failure ([address] 08/12/2021    Type 2 diabetes mellitus with hyperglycemia ([address] 06/27/2021    Acute encephalopathy 06/27/2021    Cellulitis and abscess of hand 04/24/2021    Iliac artery occlusion, right (HCC)     Cellulitis of right foot 01/29/2021    Peripheral vascular occlusive disease ([address] 01/02/2021    Ataxia     Weakness of both lower extremities 12/30/2020    Sinus bradycardia 12/30/2020    Sinus pause     GBS (Gu[place]ain[place] syndrome) ([facility])     Bilateral leg weakness 12/04/2020    Bradycardia 10/19/2019    Syncope and collapse 10/19/2019    PAF (paroxysmal atrial fibrillation) ([address]     Hypercholesteremia 07/30/2019    Chronic bronchitis (HCC) 05/21/2019    Nasal congestion 05/21/2019    Chronic, continuous use of opioids 04/15/2019    Steal syndrome of dialysis vascular access (HCC)     SOB (shortness of breath) on exertion 12/24/2018    Anemia of chronic disease 11/12/2018    Pulmonary edema 11/09/2018    Fluid overload 11/09/2018    Renovascular hypertension     Mixed hyperlipidemia     Cigarette nicotine dependence in remission     Neuromuscular disorder (HCC)     Acute diastolic CHF (congestive heart failure) ([address] 03/18/2018    Hemodialysis-associated hypotension 11/22/2017    ESRD (end stage renal disease) ([address] 11/22/2017    Mucus plugging of bronchi     Nonrheumatic aortic valve stenosis     Pleural effusion     Chronic anemia     History of CVA (cerebrovascular accident)     Type 2 diabetes mellitus without complication, without long-term current use of insulin ([address]     ZAINAB (obstructive sleep apnea)     Tobacco abuse 05/21/2017    CVA (cerebral vascular accident) ([facility] 05/21/2017    Angina, class IV (HCC)     Dyspnea     Gastroparesis due to DM ([address]     Biliary colic 69/18/1378    Symptomatic cholelithiasis 01/04/2017    Degeneration of lumbar or lumbosacral intervertebral disc 03/18/2016    Lumbar radiculopathy 03/18/2016    Lumbosacral spondylosis without myelopathy 03/18/2016    ZAINAB on CPAP 02/11/2016    Obesity (BMI 30-39. 9)     PNA (pneumonia) 03/28/2015    Depression with anxiety 06/05/2014    Passive smoke exposure 05/30/2014    Diabetic neuropathy ([facility] 11/14/2013    Claudication in peripheral vascular disease ([facility] 06/26/2013    Bilateral hilar adenopathy syndrome 06/03/2013    Sepsis without acute organ dysfunction ([facility] 12/25/2012     44 yo WM with hx of ESRD, htn, dm2, obesity, tobacco use p/w sob and bradycardia issues. He reports th/o dysphagia to solids. Plan:   1. Supportive care  2. Needs PPI  3. Will need outpatient EGD/dil when can be off Eliquis for 3 days and off Plavix for 7 days  4.  Will follow    Oh Bethea MD       (O) [phone]

## 2022-07-08 NOTE — FLOWSHEET NOTE
07/08/22 1408 07/08/22 1750   Treatment   Time On 1415  --    Time Off  --  1747   Treatment Goal 4000  --    Vital Signs   BP (!) 145/75 (!) 158/63   Temp 98.7 °F (37.1 °C) 98.3 °F (36.8 °C)   Heart Rate 62 61   Resp 18 18   Weight 242 lb 11.6 oz (110.1 kg) 232 lb 12.9 oz (105.6 kg)   Weight Method Actual;Bed scale Actual;Bed scale   Percent Weight Change 3.57 -4.09   Dry Weight 234 lb 12.6 oz (106.5 kg)  --    Treatment Initiation   Dialyze Hours 3.5  --    Post-Hemodialysis Assessment   Rinseback Volume (ml)  --  400 ml   Blood Volume Processed (Liters)  --  79.2 l/min   Dialyzer Clearance  --  Lightly streaked   Duration of Treatment (minutes)  --  210 minutes   Hemodialysis Intake (ml)  --  400 ml   Hemodialysis Output (ml)  --  437 ml   NET Removed (ml)  --  37     Treatment time: 210 min    Net UF: 3972 ml     Pre weight: 110.1 k  Post weight: 105.6 k  EDW: 106.5k     Access used: LIJ TDC  Access function: Good     Medications or blood products given: Retacrit 10,000 units     Regular outpatient schedule: MWF [address]     Summary of response to treatment: Tolerated well. Crit line error, unable to use.     Copy of dialysis treatment record placed in chart, to be scanned into EMR. Report to [facility] RN.

## 2022-07-08 NOTE — PROGRESS NOTES
Hospitalist Progress Note      PCP: Tammy Ontiveros MD    Date of Admission: 7/5/2022      Chief Complaint: [facility] Course: reviewed     Subjective: still with ongoing nausea, notes problems swallowing/food getting stuck       Medications:  Reviewed    Infusion Medications    dextrose       Scheduled Medications    epoetin siddharth-epbx  10,000 Units IntraVENous Q MWF    apixaban  5 mg Oral BID    calcium acetate  667 mg Oral TID WC    clopidogrel  75 mg Oral Daily    DULoxetine  30 mg Oral Daily    isosorbide dinitrate  20 mg Oral TID    linaclotide  145 mcg Oral QAM AC    metoprolol succinate  50 mg Oral BID    pravastatin  40 mg Oral Daily    tiotropium-olodaterol  2 puff Inhalation Daily    insulin lispro  0-12 Units SubCUTAneous TID WC    insulin lispro  0-6 Units SubCUTAneous Nightly    QUEtiapine  50 mg Oral Nightly    traZODone  100 mg Oral Nightly     PRN Meds: prochlorperazine, albuterol sulfate HFA, ipratropium-albuterol, glucose, dextrose bolus **OR** dextrose bolus, glucagon (rDNA), dextrose, oxyCODONE-acetaminophen, heparin (porcine), calcium carbonate      Intake/Output Summary (Last 24 hours) at 7/8/2022 0754  Last data filed at 7/7/2022 1429  Gross per 24 hour   Intake 360 ml   Output 75 ml   Net 285 ml       Physical Exam Performed:    BP (!) 150/79   Pulse 64   Temp 97.3 °F (36.3 °C) (Oral)   Resp 16   Ht 5' 9\" (1.753 m)   Wt 234 lb 5.6 oz (106.3 kg)   SpO2 98%   BMI 34.61 kg/m²     General appearance: No apparent distress, appears stated age and cooperative. HEENT: Pupils equal, round, and reactive to light. Conjunctivae/corneas clear. Neck: Supple, with full range of motion. No jugular venous distention. Trachea midline. Respiratory:  Normal respiratory effort. Clear to auscultation, bilaterally without Rales/Wheezes/Rhonchi. Cardiovascular: Regular rate and rhythm with normal S1/S2 without murmurs, rubs or gallops.   Abdomen: Soft, non-tender, non-distended with normal bowel sounds. Musculoskeletal: No clubbing, cyanosis or edema bilaterally. Full range of motion without deformity. Skin: Skin color, texture, turgor normal.  No rashes or lesions. Neurologic:  Neurovascularly intact without any focal sensory/motor deficits. Cranial nerves: II-XII intact, grossly non-focal.  Psychiatric: Alert and oriented, thought content appropriate, normal insight  Capillary Refill: Brisk,3 seconds, normal   Peripheral Pulses: +2 palpable, equal bilaterally       Labs:   Recent Labs     07/06/22  1455 07/07/22  0947 07/08/22  0517   WBC 8.1 8.7 7.1   HGB 9.8* 9.7* 9.9*   HCT 30.1* 30.4* 30.7*    213 219     Recent Labs     07/06/22  0701 07/07/22  0947 07/08/22  0517   * 131* 132*   K 4.9 4.9 5.2*   CL 95* 93* 94*   CO2 17* 20* 20*   * 59* 80*   CREATININE 11.5* 6.8* 8.5*   CALCIUM 8.7 8.8 8.6   PHOS 8.7* 6.3* 7.2*     Recent Labs     07/05/22  2235   AST 22   ALT 7*   BILITOT <0.2   ALKPHOS 98     No results for input(s): INR in the last 72 hours. Recent Labs     07/05/22  2235   TROPONINI 0.09*       Urinalysis:      Lab Results   Component Value Date/Time    NITRU Negative 05/29/2022 12:51 PM    WBCUA 10-20 05/29/2022 12:51 PM    BACTERIA 3+ 05/29/2022 12:51 PM    RBCUA 5-10 05/29/2022 12:51 PM    BLOODU TRACE-INTACT 05/29/2022 12:51 PM    SPECGRAV 1.015 05/29/2022 12:51 PM    GLUCOSEU 100 05/29/2022 12:51 PM       Radiology:  XR ABDOMEN (KUB) (SINGLE AP VIEW)   Final Result   Several mildly distended small bowel loops in the left mid abdomen, possibly   a focal ileus. Otherwise nonspecific bowel-gas pattern. XR CHEST PORTABLE   Final Result   1. Cardiomegaly, with mild vascular congestion   2. Persistent left basilar opacification, and left pleural effusion. Opacification may be on the basis of atelectasis, pneumonia and effusion   3.  Improved aeration present within the right lung base, consistent with   resolving atelectasis and or pneumonia XR ABDOMEN (KUB) (SINGLE AP VIEW)    (Results Pending)           Assessment/Plan:    Active Hospital Problems    Diagnosis     Acute on chronic combined systolic and diastolic heart failure (HCC) [I50.43]      Priority: High    Dyslipidemia [E78.5]      Priority: High    Type 2 diabetes, uncontrolled, with neuropathy (HCC) [E11.40, E11.65]      Priority: High    HTN (hypertension), benign [I10]      Priority: Medium    PAF (paroxysmal atrial fibrillation) ([facility]) [I48.0]     Fluid overload [E87.70]     ESRD (end stage renal disease) (HCC) [N18.6]     Tobacco abuse [Z72.0]     Obesity (BMI 30-39. 9) [E66.9]          Dyspnea 2/2 fluid overload in patient with ESRD and Acute on chronic combined systolic and diastolic CHF  -BUN//21.2  -nephrology consulted - appreciated recommendations in advance  -ProBNP > 84330  -strict I&O  -daily weights   - needing HD     Essential HTN  -continued metoprolol and isordil     Nausea/abd discomfort- noted 7/6 pm, 7/7 xray noted possible ileus  -supportive care   -GI consulted given xray 7/8 concerning for gastric outlet syndrome    HLD  -continued pravastatin     Tobacco use  -tobacco cessation  -nicotine patch     Obesity  With Body mass index is 36.6 kg/m². Complicating assessment and treatment. Placing patient at risk for multiple co-morbidities as well as early death and contributing to the patient's presentation. Counseled on weight loss     PAF  -anticoagulated on eliquis  -continue BB     DM2, uncontrolled  -BG 170  -hemglobin a1c 6.2 on 6/21/2022  -hold home metformin  -mdssi  -poct ac/hs  -hypoglycemia protocol  -carb control diet     DVT Prophylaxis: on home eliquis  Diet: ADULT DIET; Regular; Low Potassium (Less than 3000 mg/day);  Low Phosphorus (Less than 1000 mg)  Code Status: Full Code      PT/OT Eval Status: not needed     Dispo - repeat xray pending today, Gi consult    Lazaro Sorenson MD

## 2022-07-08 NOTE — FLOWSHEET NOTE
07/08/22 1408 07/08/22 1750   Treatment   Time On 1415  --    Time Off  --  1747   Treatment Goal 4000  --    Vital Signs   BP (!) 145/75 (!) 158/63   Temp 98.7 °F (37.1 °C) 98.3 °F (36.8 °C)   Heart Rate 62 61   Resp 18 18   Weight 242 lb 11.6 oz (110.1 kg) 232 lb 12.9 oz (105.6 kg)   Weight Method Actual;Bed scale Actual;Bed scale   Percent Weight Change 3.57 -4.09   Dry Weight 234 lb 12.6 oz (106.5 kg)  --    Treatment Initiation   Dialyze Hours 3.5  --    Post-Hemodialysis Assessment   Rinseback Volume (ml)  --  400 ml   Blood Volume Processed (Liters)  --  79.2 l/min   Duration of Treatment (minutes)  --  210 minutes   Hemodialysis Intake (ml)  --  400 ml   Hemodialysis Output (ml)  --  437 ml   NET Removed (ml)  --  37   Tolerated Treatment  --  Good     Treatment time: 210min    Net UF: 3972 ml     Pre weight: 110.1 kg  Post weight: 105.6 kgk EDW: 106.5k     Access used: [facility] TDC  Access function: Good     Medications or blood products given: Kpddmxlf25,000 units      Regular outpatient schedule: MWF [address]     Summary of response to treatment: Tolerated tx well, VSS throughout. Crit line unable to use. Copy of dialysis treatment record placed in chart, to be scanned into EMR. Report to [facility] RN.

## 2022-07-08 NOTE — PROGRESS NOTES
Shift assessment completed. Pt A&O x4, VSS. 3 liters of O2 via nasal cannula, pt's baseline at home. Pt scheduled for dialysis today, hoping to go home afterwards. Non skid socks on. Pt reporting mild nausea this morning, PRN compazine given per MAR. Denies any needs at this time. Bed alarm active for safety. Bed locked and in lowest position. Call light and bedside table within reach. Will continue to monitor.

## 2022-07-08 NOTE — CONSULTS
Consult called to Thomas LANDIS    Who:  Dr. Hal Vieira  Date:7/8/2022,  Time:2:01 PM        Electronically signed by Bere Brown on 7/8/2022 at 2:01 PM

## 2022-07-08 NOTE — PROGRESS NOTES
[facility] Progress Note           Subjective/   45y.o. year old male who we are seeing in consultation for ESKD on HD. HPI:  Last HD on 7/4 with 3 liters removed, post-weight of 106.7 kg. States shortness of breath better, remains on 2-3 L NC which he uses at home. ROS:  Intake adequate, c/o nausea, +weak. Objective/   GEN:  Chronically ill, BP (!) 177/115   Pulse 70   Temp 98.6 °F (37 °C) (Oral)   Resp 16   Ht 5' 9\" (1.753 m)   Wt 234 lb 5.6 oz (106.3 kg)   SpO2 94%   BMI 34.61 kg/m²   HEENT: non-icteric, no JVD  CV: S1, S2 without m/r/g; no LE edema  RESP: CTA B without w/r/r; breathing wnl  ABD: +bs, soft, nt, no hsm  SKIN: warm, no rashes  ACCESS: Left IJ [facility]    Data/  Recent Labs     07/06/22  1455 07/07/22  0947 07/08/22  0517   WBC 8.1 8.7 7.1   HGB 9.8* 9.7* 9.9*   HCT 30.1* 30.4* 30.7*   MCV 90.3 91.4 91.3    213 219     Recent Labs     07/06/22  0701 07/07/22  0947 07/08/22  0517   * 131* 132*   K 4.9 4.9 5.2*   CL 95* 93* 94*   CO2 17* 20* 20*   GLUCOSE 156* 178* 126*   PHOS 8.7* 6.3* 7.2*   * 59* 80*   CREATININE 11.5* 6.8* 8.5*   LABGLOM 5* 9* 7*   GFRAA 6* 10* 8*       Assessment/     # End stage kidney disease - on HD Mon-Wed-Fri     # sCHF/Ischemic cardiomyopathy - EF worse at ~20-25%      # CAD: s/p PATRICIA RCA 1/14/2022; s/p PATRICIA LAD 2015     # Bicuspid AV/severe AS - s/p TAVR 10/2019     # Hypertension - poorly controlled     # Anemia of chronic disease - DAVON with HD     # Hyponatremia - chronic, due to fluid overload, monitor with HD     # Hyperkalemia - better with medical mgmt, monitor with HD       Plan/     - HD tomorrow - leaving at lower EDW as outpatient at ~106.5 kg  - Resume DAVON with HD today  - Trend labs, bp's    ____________________________________  Stefani Parks MD  [facility]  [email or web address]  Office: [phone]

## 2022-07-08 NOTE — CARE COORDINATION
Chart reviewed day 3. Care managed per nephrology and IM. D/c cancelled yesterday due to nausea. Plan for dialysis treatment today, hopeful he tolerates. [address] notified did not d/c yesterday. Will continue to follow.  Cele Jurado RN

## 2022-07-09 VITALS
DIASTOLIC BLOOD PRESSURE: 81 MMHG | OXYGEN SATURATION: 98 % | WEIGHT: 232.81 LBS | TEMPERATURE: 97.4 F | SYSTOLIC BLOOD PRESSURE: 169 MMHG | HEART RATE: 62 BPM | HEIGHT: 69 IN | RESPIRATION RATE: 19 BRPM | BODY MASS INDEX: 34.48 KG/M2

## 2022-07-09 LAB
GLUCOSE BLD-MCNC: 121 MG/DL (ref 70–99)
GLUCOSE BLD-MCNC: 190 MG/DL (ref 70–99)
PERFORMED ON: ABNORMAL
PERFORMED ON: ABNORMAL

## 2022-07-09 PROCEDURE — 2700000000 HC OXYGEN THERAPY PER DAY

## 2022-07-09 PROCEDURE — 94660 CPAP INITIATION&MGMT: CPT

## 2022-07-09 PROCEDURE — 94761 N-INVAS EAR/PLS OXIMETRY MLT: CPT

## 2022-07-09 PROCEDURE — 2500000003 HC RX 250 WO HCPCS: Performed by: NURSE PRACTITIONER

## 2022-07-09 PROCEDURE — G0378 HOSPITAL OBSERVATION PER HR: HCPCS

## 2022-07-09 PROCEDURE — 6370000000 HC RX 637 (ALT 250 FOR IP): Performed by: NURSE PRACTITIONER

## 2022-07-09 RX ADMIN — ISOSORBIDE DINITRATE 20 MG: 20 TABLET ORAL at 10:03

## 2022-07-09 RX ADMIN — CALCIUM ACETATE 667 MG: 667 CAPSULE ORAL at 10:04

## 2022-07-09 RX ADMIN — ISOSORBIDE DINITRATE 20 MG: 20 TABLET ORAL at 13:43

## 2022-07-09 RX ADMIN — CLOPIDOGREL BISULFATE 75 MG: 75 TABLET ORAL at 10:03

## 2022-07-09 RX ADMIN — DULOXETINE HYDROCHLORIDE 30 MG: 30 CAPSULE, DELAYED RELEASE ORAL at 10:03

## 2022-07-09 RX ADMIN — METOPROLOL SUCCINATE 50 MG: 50 TABLET, EXTENDED RELEASE ORAL at 10:03

## 2022-07-09 RX ADMIN — PRAVASTATIN SODIUM 40 MG: 40 TABLET ORAL at 10:04

## 2022-07-09 RX ADMIN — APIXABAN 5 MG: 5 TABLET, FILM COATED ORAL at 10:04

## 2022-07-09 RX ADMIN — CALCIUM ACETATE 667 MG: 667 CAPSULE ORAL at 12:40

## 2022-07-09 ASSESSMENT — PAIN DESCRIPTION - ORIENTATION: ORIENTATION: LOWER

## 2022-07-09 ASSESSMENT — PAIN DESCRIPTION - ONSET: ONSET: ON-GOING

## 2022-07-09 ASSESSMENT — PAIN SCALES - WONG BAKER
WONGBAKER_NUMERICALRESPONSE: 0

## 2022-07-09 ASSESSMENT — PAIN DESCRIPTION - PAIN TYPE: TYPE: CHRONIC PAIN

## 2022-07-09 ASSESSMENT — PAIN DESCRIPTION - FREQUENCY: FREQUENCY: CONTINUOUS

## 2022-07-09 ASSESSMENT — PAIN DESCRIPTION - DESCRIPTORS: DESCRIPTORS: ACHING;THROBBING;TIGHTNESS

## 2022-07-09 ASSESSMENT — PAIN - FUNCTIONAL ASSESSMENT: PAIN_FUNCTIONAL_ASSESSMENT: PREVENTS OR INTERFERES SOME ACTIVE ACTIVITIES AND ADLS

## 2022-07-09 ASSESSMENT — PAIN DESCRIPTION - LOCATION: LOCATION: BACK;HIP

## 2022-07-09 ASSESSMENT — PAIN SCALES - GENERAL: PAINLEVEL_OUTOF10: 9

## 2022-07-09 NOTE — CARE COORDINATION
CASE MANAGEMENT DISCHARGE SUMMARY      Discharge to: Home with [address]    Transportation:    Family/car: yes     Confirmed discharge plan with:     Patient: yes per RN     Family:  No-per pt     RN, name: Jasmin Smith RN    Note: Discharging nurse to complete CELINE, reconcile AVS, and place final copy with patient's discharge packet. RN to ensure that written prescriptions for  Level II medications are sent with patient to the facility as per protocol.

## 2022-07-09 NOTE — PROGRESS NOTES
[facility] Progress Note           Subjective/   45y.o. year old male who we are seeing in consultation for ESKD on HD. HPI:  Last HD on 7/8 with 4 liters removed, post-weight of 105 kg. States shortness of breath better, remains on 2-3 L NC which he uses at home. Says he is at baseline and ready for discharge. Still having difficult swallowing but GI planning to see as outpatient    ROS:  Intake adequate, c/o nausea, +weak. Objective/   GEN:  Chronically ill, BP (!) 169/81   Pulse 62   Temp 97.4 °F (36.3 °C) (Oral)   Resp 19   Ht 5' 9\" (1.753 m)   Wt 232 lb 12.9 oz (105.6 kg)   SpO2 98%   BMI 34.38 kg/m²   HEENT: non-icteric, no JVD  CV: S1, S2 without m/r/g; no LE edema  RESP: CTA B without w/r/r; breathing wnl  ABD: +bs, soft, nt, no hsm  SKIN: warm, no rashes  ACCESS: Left IJ [address]    Data/  Recent Labs     07/06/22  1455 07/07/22  0947 07/08/22  0517   WBC 8.1 8.7 7.1   HGB 9.8* 9.7* 9.9*   HCT 30.1* 30.4* 30.7*   MCV 90.3 91.4 91.3    213 219     Recent Labs     07/07/22  0947 07/08/22  0517   * 132*   K 4.9 5.2*   CL 93* 94*   CO2 20* 20*   GLUCOSE 178* 126*   PHOS 6.3* 7.2*   BUN 59* 80*   CREATININE 6.8* 8.5*   LABGLOM 9* 7*   GFRAA 10* 8*       Assessment/     # End stage kidney disease - on HD Mon-Wed-Fri     # sCHF/Ischemic cardiomyopathy - EF worse at ~20-25%      # CAD: s/p PATRICIA RCA 1/14/2022; s/p PATRICIA LAD 2015     # Bicuspid AV/severe AS - s/p TAVR 10/2019     # Hypertension - poorly controlled     # Anemia of chronic disease - DAVON with HD     # Hyponatremia - chronic, due to fluid overload, monitor with HD     # Hyperkalemia - better with medical mgmt, monitor with HD       Plan/     - HD MWF, target 106 kg  - Trend labs, bp's  - Ok for discharge planning     ____________________________________  Claudia Stokes MD  [facility]  [email or web address]  Office: [phone]

## 2022-07-09 NOTE — PROGRESS NOTES
Gastroenterology progress Note    Patient:   Luis Kearns   YOB: 1970   Facility:   [address] [place] [address]   Referring/PCP: Sonia Ordonez MD  Date:     7/9/2022  Consultant:   Marilee Mixon MD, MD    Subjective: This 45 y.o. male was admitted 7/5/2022 with a diagnosis of \"Acute pulmonary edema ([address] [O24.6]  Metabolic acidosis [A31.8]  Uremia [N19]  Fluid overload [E87.70]  ESRD needing dialysis ([address] [N18.6, Z99.2]  Acute on chronic respiratory failure with hypoxemia ([address] [J96.21]\" and is seen in consultation regarding \"dysphagia\". Information was obtained from interview of the patient, examination of the patient, and review of records. I did update the past medical, surgical, social and / or family history. Dysphagia moderate in esophagus chronic assoc w ? GERD    Current status  Present  Diet Order: ADULT DIET; Regular; Low Potassium (Less than 3000 mg/day); Low Phosphorus (Less than 1000 mg)   The patient has also experienced no N/V, hematochezia/melena, diarrhea/const.      Prior to Admission medications    Medication Sig Start Date End Date Taking? Authorizing Provider   traZODone (DESYREL) 100 MG tablet Take 100 mg by mouth nightly   Yes Historical Provider, MD   oxyCODONE-acetaminophen (PERCOCET) 7.5-325 MG per tablet Take 1 tablet by mouth every 6 hours as needed (pain) for up to 30 days.  7/5/22 8/4/22  Sonia Ordonez MD   QUEtiapine (SEROQUEL) 50 MG tablet TAKE 1 TABLET BY MOUTH EVERY EVENING 6/30/22   Sonia Ordonez MD   isosorbide dinitrate (ISORDIL) 20 MG tablet Take 1 tablet by mouth 3 times daily 6/8/22   JASE Cui   clopidogrel (PLAVIX) 75 MG tablet Take 1 tablet by mouth daily 5/9/22   JAY Sow - CNP   cyclobenzaprine (FLEXERIL) 10 MG tablet TAKE 1 TABLET BY MOUTH EVERY 8 HOURS AS NEEDED 4/28/22   Sonia Ordonez MD   pantoprazole (PROTONIX) 40 MG tablet TAKE 1 TABLET BY MOUTH EVERY MORNING BEFORE BREAKFAST 4/28/22   Anuradha Rosa MD [facility]   docusate sodium (DOK) 100 MG capsule Take 1 capsule by mouth daily 4/27/22   Rosalinda Nicole MD   LINZESS 145 MCG capsule TAKE 1 CAPSULE BY MOUTH IN THE MORNING BEFORE BREAKFAST 4/27/22   Katiuska Serrano MD   DULoxetine (CYMBALTA) 30 MG extended release capsule TAKE 1 CAPSULE BY MOUTH EVERY DAY 3/29/22   Katiuska Serrano MD   mineral oil liquid Take 30 mLs by mouth daily as needed for Constipation 3/9/22   Katiuska Serrano MD   sennosides-docusate sodium (SENOKOT-S) 8.6-50 MG tablet Take 1 tablet by mouth daily 3/9/22   Katiuska Serrano MD   metoprolol succinate (TOPROL XL) 50 MG extended release tablet Take 1 tablet by mouth in the morning and at bedtime 3/3/22   Adri Sanchez MD   apixaban (ELIQUIS) 5 MG TABS tablet Take 1 tablet by mouth 2 times daily 3/3/22   Adri Sanchez MD   pravastatin (PRAVACHOL) 40 MG tablet Take 1 tablet by mouth daily 2/10/22   Michael Province, APRN - CNP   Continuous Blood Gluc Sensor (DEXCOM G6 SENSOR) MISC Every 10 days 10/5/21   Katiuska Serrano MD   Continuous Blood Gluc Transmit (DEXCOM G6 TRANSMITTER) MISC 1 each by Does not apply route every 3 months 10/5/21   Katiuska Serrano MD   Continuous Blood Gluc  ([employer] ) [address] 1 each by Does not apply route Daily with lunch 10/5/21   Katiuska Serrano MD   B Complex-C-Folic Acid (VIRT-CAPS) 1 MG CAPS TAKE 1 CAPSULE BY MOUTH EVERY DAY 9/20/21   Katiuska Serrano MD   Calcium Acetate, Phos Binder, 667 MG CAPS TAKE 1 CAPSULE BY MOUTH THREE TIMES DAILY WITH MEALS 8/12/21   Katiuska Serrano MD   nitroGLYCERIN (NITROSTAT) 0.4 MG SL tablet DISSOLVE 1 TABLET UNDER THE TONGUE AS NEEDED FOR CHEST PAIN EVERY 5 MINUTES UP TO 3 TIMES.  IF NO RELIEF CALL 911. 1/7/21   Katiuska Serrano MD   vitamin D (ERGOCALCIFEROL) 24639 units CAPS capsule TK 1 C PO WEEKLY 6/2/19   Historical Provider, MD   Tiotropium Bromide-Olodaterol (STIOLTO RESPIMAT) 2.5-2.5 MCG/ACT AERS Inhale 2 puffs into the lungs daily 5/21/19   Kassy Escobedo MD Blood Glucose Monitoring Suppl ADAM USE AS DIRECTED. 4/25/18   Bg Grossman MD   Alcohol Swabs PADS USE AS DIRECTED 4/25/18   Bg Grossman MD   albuterol sulfate  (90 Base) MCG/ACT inhaler Inhale 2 puffs into the lungs every 6 hours as needed for Wheezing 11/8/17   Goldie Vaughan MD   ipratropium-albuterol (DUONEB) 0.5-2.5 (3) MG/3ML SOLN nebulizer solution Inhale 3 mLs into the lungs every 6 hours as needed for Shortness of Breath 10/15/17   Nela Suazo MD   calcium carbonate (TUMS) 500 MG chewable tablet Take 1 tablet by mouth 3 times daily as needed for Heartburn. Historical Provider, MD      Scheduled Medications:    epoetin siddharth-epbx  10,000 Units IntraVENous Q MWF    apixaban  5 mg Oral BID    calcium acetate  667 mg Oral TID WC    clopidogrel  75 mg Oral Daily    DULoxetine  30 mg Oral Daily    isosorbide dinitrate  20 mg Oral TID    linaclotide  145 mcg Oral QAM AC    metoprolol succinate  50 mg Oral BID    pravastatin  40 mg Oral Daily    tiotropium-olodaterol  2 puff Inhalation Daily    insulin lispro  0-12 Units SubCUTAneous TID WC    insulin lispro  0-6 Units SubCUTAneous Nightly    QUEtiapine  50 mg Oral Nightly    traZODone  100 mg Oral Nightly     Infusions:    dextrose       PRN Medications: loperamide, prochlorperazine, albuterol sulfate HFA, ipratropium-albuterol, glucose, dextrose bolus **OR** dextrose bolus, glucagon (rDNA), dextrose, oxyCODONE-acetaminophen, heparin (porcine), calcium carbonate  Allergies:    Allergies   Allergen Reactions    Morphine Nausea And Vomiting       Past Medical History:   Diagnosis Date    Ambulatory dysfunction     walker for long distances, SOB with distance    Aortic stenosis     echo 2017    Arthritis     hands and hips    Asthma     Bilateral hilar adenopathy syndrome 6/3/2013    CAD (coronary artery disease)     Dr. Roseline Pratt [facility]) 04/19/2019    EF= 43%    CH[facility]estive heart failure) [facility]     Chronic pain     COPD (chronic obstructive pulmonary disease) ([facility])     pulmonology Dr. Cheyenne Ahn    Depression     Diabetes mellitus ([facility]     borderline    Difficult intravenous access     Emphysema of lung ([facility]     ESRD (end stage renal disease) on dialysis ([facility]     MWF    Fear of needles     Gastric ulcer     GERD (gastroesophageal reflux disease)     Heart valve problem     bicuspic valve    Hemodialysis patient ([facility]     History of spinal fracture     work incident    Hx of blood clots     Bilateral lower extremities; stents in place    Hyperlipidemia     Hypertension     MI (myocardial infarction) ([address] 2019    has had 9 MIs. 2019 was the last    Neuromuscular disorder ([facility]     due to CVA    Numbness and tingling in left arm     from fistula    Pneumonia     PONV (postoperative nausea and vomiting)     Prolonged emergence from general anesthesia     States requires more medication than most people    Sleep apnea     Uses CPAP    Stroke ([facility]     7mm thalamic cva 2017 deficts left side, left side weakness    TIA (transient ischemic attack)     Unspecified diseases of blood and blood-forming organs      Past Surgical History:   Procedure Laterality Date    AORTIC VALVE REPLACEMENT N/A 10/15/2019    TRANSCATHETER AORTIC VALVE REPLACEMENT FEMORAL APPROACH performed by Saba Sofia MD at [facility] Right 7/2/2019    PERITONEAL DIALYSIS CATHETER REMOVAL performed by Ofe Huber MD at [address] [place] [address]  2/29/2015    [facility]    CORONARY ANGIOPLASTY WITH STENT PLACEMENT  05/26/15    CYST REMOVAL  08/14/2013    EXCISION CYSTS, NECK X2 AND ABDOMINAL benign    DIAGNOSTIC CARDIAC CATH LAB PROCEDURE      DIALYSIS FISTULA CREATION Left 10/30/2017    LEFT BRACHIAL CEPHALIC FISTULA    DIALYSIS FISTULA CREATION Left 3/27/2019    LIGATION  AV FISTULA performed by Wagner Viera MD at [address] [place] [address] DIAGNOSTIC      IR TUNNELED CATHETER PLACEMENT GREATER THAN 5 YEARS  3/21/2022    IR TUNNELED CATHETER PLACEMENT GREATER THAN 5 YEARS 3/21/2022 [facility]    IR TUNNELED CATHETER PLACEMENT GREATER THAN 5 YEARS  2022    IR TUNNELED CATHETER PLACEMENT GREATER THAN 5 YEARS 2022 [facility]    IR TUNNELED CATHETER PLACEMENT GREATER THAN 5 YEARS  2022    IR TUNNELED CATHETER PLACEMENT GREATER THAN 5 YEARS 2022 [facility]    IR TUNNELED CATHETER PLACEMENT GREATER THAN 5 YEARS  2022    IR TUNNELED CATHETER PLACEMENT GREATER THAN 5 YEARS 2022 [facility]    OTHER SURGICAL HISTORY  2017    laparoscopic cholecystectomy with intraoperative cholangiogram    OTHER SURGICAL HISTORY  2018    PORT PLACEMENT  - vas cath    OTHER SURGICAL HISTORY Bilateral 2018    laprascopic peritoneal dialysis catheter placement    OTHER SURGICAL HISTORY Right 2018    peritoneal dialysis port placed on right side of abdomen    OTHER SURGICAL HISTORY  2019    PTA/Stenting R External Iliac artery    NM LAP INSERTION TUNNELED INTRAPERITONEAL CATHETER N/A 2018    LAPAROSCOPIC PERITONEAL DIALYSIS CATHETER REPLACEMENT performed by Alejandro Rios MD at [address] [facility] N/A 2022    PERINEAL ABCESS DRAINAGE performed by Alejandro Rios MD at [facility] ENDOSCOPY  2016    UPPER GASTROINTESTINAL ENDOSCOPY  2017    possible candida, otherwise normal appearing    VASCULAR SURGERY  aprx 2 years ago    2 stents placed, each side of groin       Social:   Social History     Tobacco Use    Smoking status: Former Smoker     Packs/day: 0.50     Years: 33.00     Pack years: 16.50     Types: Cigarettes     Quit date: 2020     Years since quittin.2    Smokeless tobacco: Never Used    Tobacco comment: States quit 2021   Substance Use Topics    Alcohol use: Not Currently     Alcohol/week: 0.0 standard drinks     Comment: occ     Family:   Family History   Problem Relation Age of Onset    Diabetes Mother     Heart Disease Father     Kidney Disease Sister         stage 4-kidney failure    Cancer Sister     Heart Disease Sister     Obesity Sister     Cancer Sister     Heart Disease Sister     Obesity Sister     Alcohol Abuse Brother        ROS: As in HPI above    Objective:   Vital Signs:  Temp (24hrs), Av °F (36.7 °C), Min:97.4 °F (36.3 °C), Max:98.7 °F (32.2 °C)     Systolic (37QOC), MIH:440 , Min:141 , ZWM:826      Diastolic (55AHZ), EST:83, Min:63, Max:95     Pulse  Av.1  Min: 61  Max: 74  BP (!) 169/81   Pulse 62   Temp 97.4 °F (36.3 °C) (Oral)   Resp 19   Ht 5' 9\" (1.753 m)   Wt 232 lb 12.9 oz (105.6 kg)   SpO2 98%   BMI 34.38 kg/m²      Physical Exam:   General appearance: No apparent distress, appears stated age and cooperative. HEENT: Pupils equal, round, and reactive to light. Conjunctivae/corneas clear. Neck: Supple, with full range of motion. No jugular venous distention. Trachea midline. Respiratory:  Normal respiratory effort. Clear to auscultation, bilaterally without Rales/Wheezes/Rhonchi. Cardiovascular: Regular rate and rhythm with normal S1/S2 without murmurs, rubs or gallops. Abdomen: Soft, non-tender, non-distended with normal bowel sounds. Musculoskeletal: No clubbing, cyanosis or edema bilaterally. Full range of motion without deformity. Skin: Skin color, texture, turgor normal.  No rashes or lesions. Neurologic:  Neurovascularly intact without any focal sensory/motor deficits.  Cranial nerves: II-XII intact, grossly non-focal.  Psychiatric:  Alert and oriented, thought content appropriate, normal insight    Lab and Imaging Review   Recent Labs     22  1455 22  0947 22  0517   WBC 8.1 8.7 7.1   HGB 9.8* 9.7* 9.9*   MCV 90.3 91.4 91.3    213 219   NA  --  131* 132*   K  --  4.9 5.2*   CL  --  93* 94*   CO2  --  20* 20*   BUN  --  59* 80*   CREATININE  --  6.8* 8.5*   GLUCOSE  --  178* 126*   CALCIUM  --  8.8 8.6   LABALBU  --  3.6 3.8       Assessment:     Patient Active Problem List    Diagnosis Date Noted    Hypotension due to drugs 11/25/2017    Acute on chronic combined systolic and diastolic heart failure (HCC)     Ischemic cardiomyopathy     Dyslipidemia     Type 2 diabetes, uncontrolled, with neuropathy ([address] 03/04/2014    Bicuspid aortic valve 06/03/2013    CHF (congestive heart failure) ([address] 05/14/2013    Coronary artery disease of native artery of native heart with stable angina pectoris ([address] 05/14/2013    PVD (peripheral vascular disease) ([address] 05/14/2013    Altered mental status     Hypoglycemia 05/29/2022    Morbid obesity with BMI of 40.0-44.9, adult ([address] 04/26/2022    Former smoker 04/19/2022    Cardiomyopathy ([address] 04/19/2022    History of transcatheter aortic valve replacement (TAVR) 10/19/2019    HTN (hypertension), benign 11/14/2013    Asthma-COPD overlap syndrome ([address] 05/14/2013    Respiratory failure ([address] 04/18/2022    Pulmonary edema with diastolic CHF, NYHA class 3 ([address] 03/17/2022    Liberty-rectal abscess     Somnolence     Atrial flutter ([address]     SIRS (systemic inflammatory response syndrome) ([address] 02/21/2022    NSTEMI (non-ST elevated myocardial infarction) ([address] 01/12/2022    Acute respiratory failure with hypercapnia ([address] 11/30/2021    Acute pulmonary edema ([address] 11/30/2021    Grade II diastolic dysfunction 15/60/2977    Shock circulatory ([address] 11/30/2021    Smoker 11/30/2021    Normocytic normochromic anemia 11/30/2021    Respiratory failure with hypoxia ([address] 11/29/2021    Speech problem     Urinary tract infection with hematuria     Acute CVA [facility]cerebrovascular accident) [facility] 09/07/2021    [facility] hypoxemic respiratory failure [facility] 08/12/2021    Type 2 diabetes mellitus with hyperglycemia ([facility] 06/27/2021    Acute encephalopathy 06/27/2021    Cellulitis and abscess of hand 04/24/2021    Iliac artery occlusion, right (HCC)     Cellulitis of right foot 01/29/2021    Peripheral vascular occlusive disease ([address] 01/02/2021    Ataxia     Weakness of both lower extremities 12/30/2020    Sinus bradycardia 12/30/2020    Sinus pause     GBS (Guillain-[place] syndrome) ([facility])     Bilateral leg weakness 12/04/2020    Bradycardia 10/19/2019    Syncope and collapse 10/19/2019    PAF (paroxysmal atrial fibrillation) ([address]     Hypercholesteremia 07/30/2019    Chronic bronchitis (HCC) 05/21/2019    Nasal congestion 05/21/2019    Chronic, continuous use of opioids 04/15/2019    Steal syndrome of dialysis vascular access ([facility])     SOB (shortness of breath) on exertion 12/24/2018    Anemia of chronic disease 11/12/2018    Pulmonary edema 11/09/2018    Fluid overload 11/09/2018    Renovascular hypertension     Mixed hyperlipidemia     Cigarette nicotine dependence in remission     Neuromuscular disorder ([facility])     Acute diastolic CHF (congestive heart failure) ([address] 03/18/2018    Hemodialysis-associated hypotension 11/22/2017    ESRD (end stage renal disease) ([address] 11/22/2017    Mucus plugging of bronchi     Nonrheumatic aortic valve stenosis     Pleural effusion     Chronic anemia     History of CVA (cerebrovascular accident)     Type 2 diabetes mellitus without complication, without long-term current use of insulin ([address]     ZAINAB (obstructive sleep apnea)     Tobacco abuse 05/21/2017    CVA (cerebral vascular accident) ([address] 05/21/2017    Angina, class IV ([address]     Dyspnea     Gastroparesis due to DM ([address]     Biliary colic 31/70/4421    Symptomatic cholelithiasis 01/04/2017    Degeneration of lumbar or lumbosacral intervertebral disc 03/18/2016    Lumbar radiculopathy 03/18/2016    Lumbosacral spondylosis without myelopathy 03/18/2016    ZAINAB on CPAP 02/11/2016    Obesity (BMI 30-39. 9)     PNA (pneumonia) 03/28/2015    Depression with anxiety 06/05/2014    Passive smoke exposure 05/30/2014    Diabetic neuropathy ([facility] 11/14/2013    Claudication in peripheral vascular disease ([facility] 06/26/2013    Bilateral hilar adenopathy syndrome 06/03/2013    Sepsis without acute organ dysfunction ([facility] 12/25/2012     46 yo WM with hx of ESRD, htn, dm2, obesity, tobacco use p/w sob and bradycardia issues. He reports th/o dysphagia to solids. Plan:   1. Supportive care  2. Needs PPI  3. Will need outpatient EGD/dil when can be off Eliquis for 3 days and off Plavix for 7 days  4.  Will follow    Candice Bacon MD       (O) [phone]

## 2022-07-09 NOTE — DISCHARGE SUMMARY
and treatment. Nephrology has been consulted for the am. The patient denied any other associated symptoms as well as any aggravating and/or alleviating factors. At the time of this assessment, the patient was resting comfortably in bed. He currently denies any chest pain, back pain, abdominal pain, shortness of breath, numbness, tingling, N/V/C/D, fever and/or chills.             Dyspnea 2/2 fluid overload in patient with ESRD and Acute on chronic combined systolic and diastolic CHF  -BUN//79.3  -nephrology consulted - appreciated recommendations in advance  -ProBNP > 73385  -strict I&O  -daily weights   - was needing HD     Essential HTN  -continued metoprolol and isordil     Nausea/abd discomfort- noted 7/6 pm, 7/7 xray noted possible ileus  -supportive care   -GI consulted given xray 7/8 concerning for gastric outlet syndrome but noted resolved ileus, plan for outpt workup EGD/DIL(rec off eliquis for 3 days and plavix for 7 days)     HLD  -continued pravastatin     Tobacco use  -tobacco cessation  -nicotine patch     Obesity  With Body mass index is 36.6 kg/m². Complicating assessment and treatment. Placing patient at risk for multiple co-morbidities as well as early death and contributing to the patient's presentation. Counseled on weight loss     PAF  -anticoagulated on eliquis  -continue BB     DM2, uncontrolled  -BG 170  -hemglobin a1c 6.2 on 6/21/2022  -held home metformin  -mdssi  -poct ac/hs  -hypoglycemia protocol  -carb control diet      Physical Exam Performed:     BP (!) 169/81   Pulse 62   Temp 97.4 °F (36.3 °C) (Oral)   Resp 19   Ht 5' 9\" (1.753 m)   Wt 232 lb 12.9 oz (105.6 kg)   SpO2 98%   BMI 34.38 kg/m²       General appearance:  No apparent distress, appears stated age and cooperative. HEENT:  Normal cephalic, atraumatic without obvious deformity. Pupils equal, round, and reactive to light. Extra ocular muscles intact. Conjunctivae/corneas clear.   Neck: Supple, with full range of motion. No jugular venous distention. Trachea midline. Respiratory:  Normal respiratory effort. Clear to auscultation, bilaterally without Rales/Wheezes/Rhonchi. Cardiovascular:  Regular rate and rhythm with normal S1/S2 without murmurs, rubs or gallops. Abdomen: Soft, non-tender, non-distended with normal bowel sounds. Musculoskeletal:  No clubbing, cyanosis or edema bilaterally. Full range of motion without deformity. Skin: Skin color, texture, turgor normal.  No rashes or lesions. Neurologic:  Neurovascularly intact without any focal sensory/motor deficits. Cranial nerves: II-XII intact, grossly non-focal.  Psychiatric:  Alert and oriented, thought content appropriate, normal insight  Capillary Refill: Brisk,< 3 seconds   Peripheral Pulses: +2 palpable, equal bilaterally       Labs: For convenience and continuity at follow-up the following most recent labs are provided:      CBC:    Lab Results   Component Value Date/Time    WBC 7.1 07/08/2022 05:17 AM    HGB 9.9 07/08/2022 05:17 AM    HCT 30.7 07/08/2022 05:17 AM     07/08/2022 05:17 AM       Renal:    Lab Results   Component Value Date/Time     07/08/2022 05:17 AM    K 5.2 07/08/2022 05:17 AM    K 6.0 07/04/2022 03:34 PM    CL 94 07/08/2022 05:17 AM    CO2 20 07/08/2022 05:17 AM    BUN 80 07/08/2022 05:17 AM    CREATININE 8.5 07/08/2022 05:17 AM    CALCIUM 8.6 07/08/2022 05:17 AM    PHOS 7.2 07/08/2022 05:17 AM         Significant Diagnostic Studies    Radiology:   XR ABDOMEN (KUB) (SINGLE AP VIEW)   Final Result   No radiographic evidence of ileus. The stomach does appear distended and full of ingested material, which raise   the possibility of gastroparesis. Gastric outlet obstruction remains in the   differential as well. XR ABDOMEN (KUB) (SINGLE AP VIEW)   Final Result   Several mildly distended small bowel loops in the left mid abdomen, possibly   a focal ileus. Otherwise nonspecific bowel-gas pattern.          XR CHEST PORTABLE   Final Result   1. Cardiomegaly, with mild vascular congestion   2. Persistent left basilar opacification, and left pleural effusion. Opacification may be on the basis of atelectasis, pneumonia and effusion   3. Improved aeration present within the right lung base, consistent with   resolving atelectasis and or pneumonia                Consults:     IP CONSULT TO NEPHROLOGY  IP CONSULT TO RESPIRATORY CARE  IP CONSULT TO HOME CARE NEEDS  IP CONSULT TO GI    Disposition:  home     Condition at Discharge: Stable    Discharge Instructions/Follow-up:  nephro as outpt    Code Status:  Full Code     Activity: activity as tolerated    Diet: renal diet      Discharge Medications:     Discharge Medication List as of 7/9/2022  1:38 PM           Details   traZODone (DESYREL) 100 MG tablet Take 100 mg by mouth nightlyHistorical Med      oxyCODONE-acetaminophen (PERCOCET) 7.5-325 MG per tablet Take 1 tablet by mouth every 6 hours as needed (pain) for up to 30 days. , Disp-120 tablet, R-0Normal      QUEtiapine (SEROQUEL) 50 MG tablet TAKE 1 TABLET BY MOUTH EVERY EVENING, Disp-30 tablet, R-10Normal      isosorbide dinitrate (ISORDIL) 20 MG tablet Take 1 tablet by mouth 3 times daily, Disp-90 tablet, R-3Normal      clopidogrel (PLAVIX) 75 MG tablet Take 1 tablet by mouth daily, Disp-90 tablet, R-3Normal      cyclobenzaprine (FLEXERIL) 10 MG tablet TAKE 1 TABLET BY MOUTH EVERY 8 HOURS AS NEEDED, Disp-90 tablet, R-10Normal      pantoprazole (PROTONIX) 40 MG tablet TAKE 1 TABLET BY MOUTH EVERY MORNING BEFORE BREAKFAST, Disp-30 tablet, R-10Normal      docusate sodium (DOK) 100 MG capsule Take 1 capsule by mouth daily, Disp-30 capsule, R-5Normal      LINZESS 145 MCG capsule TAKE 1 CAPSULE BY MOUTH IN THE MORNING BEFORE BREAKFAST, Disp-30 capsule, R-10Normal      DULoxetine (CYMBALTA) 30 MG extended release capsule TAKE 1 CAPSULE BY MOUTH EVERY DAY, Disp-30 capsule, R-10Normal      mineral oil liquid Take 30 mLs by mouth daily as needed for Constipation, Oral, DAILY PRN Starting Wed 3/9/2022, Disp-300 mL, R-0, Normal      sennosides-docusate sodium (SENOKOT-S) 8.6-50 MG tablet Take 1 tablet by mouth daily, Disp-10 tablet, R-0Normal      metoprolol succinate (TOPROL XL) 50 MG extended release tablet Take 1 tablet by mouth in the morning and at bedtime, Disp-60 tablet, R-1Normal      apixaban (ELIQUIS) 5 MG TABS tablet Take 1 tablet by mouth 2 times daily, Disp-60 tablet, R-1Normal      pravastatin (PRAVACHOL) 40 MG tablet Take 1 tablet by mouth daily, Disp-90 tablet, R-3Normal      Continuous Blood Gluc Sensor (DEXCOM G6 SENSOR) MISC Every 10 days, Disp-9 each, R-3Print      Continuous Blood Gluc Transmit (DEXCOM G6 TRANSMITTER) MISC 1 each by Does not apply route every 3 months, Disp-1 each, R-3Print      Continuous Blood Gluc  (DEXCOM G6 ) ADAM 1 each by Does not apply route Daily with lunch, Disp-1 each, R-0Print      B Complex-C-Folic Acid (VIRT-CAPS) 1 MG CAPS TAKE 1 CAPSULE BY MOUTH EVERY DAY, Disp-90 capsule, R-1Normal      Calcium Acetate, Phos Binder, 667 MG CAPS TAKE 1 CAPSULE BY MOUTH THREE TIMES DAILY WITH MEALS, Disp-90 capsule, R-3Normal      nitroGLYCERIN (NITROSTAT) 0.4 MG SL tablet DISSOLVE 1 TABLET UNDER THE TONGUE AS NEEDED FOR CHEST PAIN EVERY 5 MINUTES UP TO 3 TIMES. IF NO RELIEF CALL 911., Disp-25 tablet, R-10Normal      vitamin D (ERGOCALCIFEROL) 83227 units CAPS capsule TK 1 C PO WEEKLY, R-11Historical Med      Tiotropium Bromide-Olodaterol (STIOLTO RESPIMAT) 2.5-2.5 MCG/ACT AERS Inhale 2 puffs into the lungs daily, Disp-2 Inhaler, R-02 samples given:  Lot #[id number], Exp 7/21 & Lot #[id number], Exp 7/21NO PRINT      Blood Glucose Monitoring Suppl ADAM Disp-1 Device, R-0, NormalUSE AS DIRECTED.       Alcohol Swabs PADS Disp-300 each, R-3, NormalUSE AS DIRECTED      albuterol sulfate  (90 Base) MCG/ACT inhaler Inhale 2 puffs into the lungs every 6 hours as needed for Wheezing, Disp-1 Inhaler, R-3Print      ipratropium-albuterol (DUONEB) 0.5-2.5 (3) MG/3ML SOLN nebulizer solution Inhale 3 mLs into the lungs every 6 hours as needed for Shortness of Breath, Disp-360 mL, R-1Print      calcium carbonate (TUMS) 500 MG chewable tablet Take 1 tablet by mouth 3 times daily as needed for Heartburn. Time Spent on discharge is more than 30 minutes in the examination, evaluation, counseling and review of medications and discharge plan. Signed:    Sumanth Kennedy MD   7/9/2022      Thank you Chris Penn MD for the opportunity to be involved in this patient's care. If you have any questions or concerns, please feel free to contact me at [phone].

## 2022-07-09 NOTE — PROGRESS NOTES
07/09/22 0429   NIV Type   NIV Started/Stopped On   Equipment Type v60   Mode CPAP   Mask Type Full face mask   Mask Size Medium   Settings/Measurements   CPAP/EPAP 10 cmH2O   Resp 18   FiO2  40 %   Vt Exhaled 358 mL   Minute Volume 6.5 Liters   Mask Leak (lpm) 51 lpm  (beard )   Comfort Level Good   Using Accessory Muscles No   SpO2 987   Patient's Home Machine No   Breath Sounds   Right Upper Lobe Diminished   Right Middle Lobe Diminished   Right Lower Lobe Diminished   Left Upper Lobe Diminished   Left Lower Lobe Diminished   Alarm Settings   Alarms On Y   Low Pressure 6 cmH2O   High Pressure  30 cmH2O   Delay Alarm 20 sec(s)   RR Low  6   RR High 40 br/min

## 2022-07-09 NOTE — PLAN OF CARE
Problem: Pain  Goal: Verbalizes/displays adequate comfort level or baseline comfort level  7/9/2022 1340 by Bridget Orellana RN  Outcome: Completed  Flowsheets (Taken 7/9/2022 1000 by Payal Self RN)  Verbalizes/displays adequate comfort level or baseline comfort level:   Encourage patient to monitor pain and request assistance   Assess pain using appropriate pain scale   Administer analgesics based on type and severity of pain and evaluate response   Implement non-pharmacological measures as appropriate and evaluate response   Consider cultural and social influences on pain and pain management   Notify Licensed Independent Practitioner if interventions unsuccessful or patient reports new pain  7/9/2022 1333 by Payal Self RN  Outcome: Progressing  Flowsheets (Taken 7/9/2022 1000)  Verbalizes/displays adequate comfort level or baseline comfort level:   Encourage patient to monitor pain and request assistance   Assess pain using appropriate pain scale   Administer analgesics based on type and severity of pain and evaluate response   Implement non-pharmacological measures as appropriate and evaluate response   Consider cultural and social influences on pain and pain management   Notify Licensed Independent Practitioner if interventions unsuccessful or patient reports new pain     Problem: Safety - Adult  Goal: Free from fall injury  7/9/2022 1340 by Bridget Orellana RN  Outcome: Completed  Flowsheets (Taken 7/9/2022 1340)  Free From Fall Injury:   Instruct family/caregiver on patient safety   Based on caregiver fall risk screen, instruct family/caregiver to ask for assistance with transferring infant if caregiver noted to have fall risk factors  7/9/2022 1333 by Payal Self RN  Outcome: Progressing     Problem: ABCDS Injury Assessment  Goal: Absence of physical injury  7/9/2022 1340 by Bridget Orellana RN  Outcome: Completed  Flowsheets (Taken 7/9/2022 1330 by Trinidad Lara

## 2022-07-09 NOTE — PROGRESS NOTES
07/09/22 0003   NIV Type   $NIV $Daily Charge   NIV Started/Stopped On   Equipment Type v60   Mode CPAP   Mask Type Full face mask   Mask Size Medium   Settings/Measurements   CPAP/EPAP 10 cmH2O   Resp 20   FiO2  40 %   Vt Exhaled 708 mL   Minute Volume 13.9 Liters   Comfort Level Good   Using Accessory Muscles No   SpO2 96   Patient's Home Machine No   Breath Sounds   Right Upper Lobe Diminished   Right Middle Lobe Diminished   Right Lower Lobe Diminished   Left Upper Lobe Diminished   Left Lower Lobe Diminished   Alarm Settings   Alarms On Y   Low Pressure 6 cmH2O   High Pressure  30 cmH2O   Delay Alarm 20 sec(s)   RR Low  6   RR High 40 br/min

## 2022-07-09 NOTE — PROGRESS NOTES
Pt had c/o diarrhea last night and medicated with PRN Imodium. Pt rested well overnight on CPAP with no new complaints.

## 2022-07-09 NOTE — PROGRESS NOTES
Conscious and coherent. Alert and Oriented x 4. No complaints of dyspnea/chest heaviness. No cyanosis or pallor. On 3 LPM per nasal cannula. O2sat: 98%. With dry and intact dressing over left subclavian area, no bleeding/discharges. With dry and intact IV on Right arm, no phlebitis/infiltration.

## 2022-07-09 NOTE — PROGRESS NOTES
Pt departed the floor and was discharged in stable condition at 1423 via wheelchair. Pt was educated on his hospital course and discharge instructions- verbal and written understanding was given. Pts belongings were accounted for and sent with him. Pts IV was removed w/o any complications and was intact.

## 2022-07-09 NOTE — PLAN OF CARE
Problem: Pain  Goal: Verbalizes/displays adequate comfort level or baseline comfort level  Outcome: Progressing  Flowsheets (Taken 7/9/2022 1000)  Verbalizes/displays adequate comfort level or baseline comfort level:   Encourage patient to monitor pain and request assistance   Assess pain using appropriate pain scale   Administer analgesics based on type and severity of pain and evaluate response   Implement non-pharmacological measures as appropriate and evaluate response   Consider cultural and social influences on pain and pain management   Notify Licensed Independent Practitioner if interventions unsuccessful or patient reports new pain     Problem: Safety - Adult  Goal: Free from fall injury  Outcome: Progressing     Problem: ABCDS Injury Assessment  Goal: Absence of physical injury  Outcome: Progressing  Flowsheets (Taken 7/9/2022 1330)  Absence of Physical Injury: Implement safety measures based on patient assessment     Problem: Chronic Conditions and Co-morbidities  Goal: Patient's chronic conditions and co-morbidity symptoms are monitored and maintained or improved  Outcome: Progressing

## 2022-07-11 ENCOUNTER — CARE COORDINATION (OUTPATIENT)
Dept: CASE MANAGEMENT | Age: 52
End: 2022-07-11

## 2022-07-11 ENCOUNTER — TELEPHONE (OUTPATIENT)
Dept: FAMILY MEDICINE CLINIC | Age: 52
End: 2022-07-11

## 2022-07-11 ENCOUNTER — TELEPHONE (OUTPATIENT)
Dept: CARDIOLOGY CLINIC | Age: 52
End: 2022-07-11

## 2022-07-11 NOTE — TELEPHONE ENCOUNTER
Last seen 1/18/22 by TL through a VV. Has no showed or canceled all visits after. Is scheduled for this week.

## 2022-07-11 NOTE — CARE COORDINATION
[facility] Transitions Initial Follow Up Call    Call within 2 business days of discharge: Yes    Patient: Rachel Herrmann Patient : 1970   MRN: [id number]  Reason for Admission: respiratory fx/fluid overload  Discharge Date: 22 RARS: Readmission Risk Score: 48.1 ( )      Last Discharge [facility]       Complaint Diagnosis Description Type Department Provider    22 Shortness of Breath; Other Acute on chronic respiratory failure with hypoxemia ([facility] [address] . .. ED to Hosp-Admission (Discharged) (ADMITTED) Jerardo Foster MD; Gregorio Styles. .. Spoke with: na    Attempted to reach patient via phone for initial post hospital transition call. VM left stating purpose of call along with my contact information requesting a return call.           Care Transitions 24 Hour Call    Do you have all of your prescriptions and are they filled?: Yes  Do you have support at home?: Partner/Spouse/SO  Are you an active caregiver in your home?: No  Care Transitions Interventions         Follow Up  Future Appointments   Date Time Provider Nelly Darby   2022 11:00 AM Donna Torres  [address]   2022  2:15 PM JAY Chaudhry - CNP [facility] FLOR Altman RN

## 2022-07-11 NOTE — TELEPHONE ENCOUNTER
Pts hsfu with npdd for 07/14 needed to r/s. Pt stated that he needs a Tuesday or Thursday. Pt is r/s to 09/01/2022. Pt would like to know if its ok for him to wait until then? Please advise.

## 2022-07-11 NOTE — TELEPHONE ENCOUNTER
[address] requesting Verbal if PCP will Continue to follow Pt's Home Care? Nurse Laz Laird states message can be left on voice mail.  [phone]

## 2022-07-12 ENCOUNTER — FOLLOWUP TELEPHONE ENCOUNTER (OUTPATIENT)
Dept: TELEMETRY | Age: 52
End: 2022-07-12

## 2022-07-12 ENCOUNTER — TELEPHONE (OUTPATIENT)
Dept: FAMILY MEDICINE CLINIC | Age: 52
End: 2022-07-12

## 2022-07-12 ENCOUNTER — CARE COORDINATION (OUTPATIENT)
Dept: CASE MANAGEMENT | Age: 52
End: 2022-07-12

## 2022-07-12 NOTE — TELEPHONE ENCOUNTER
Please advise. Patient has missed many, many appointments. Cancelled or No Show. Last time seen 1.21.22    Appointment today was supposed to be a hosp.  F/U

## 2022-07-12 NOTE — CARE COORDINATION
[facility] Transitions Initial Follow Up Call    Call within 2 business days of discharge: Yes    Patient: Lucia Malone Patient : 1970   MRN: [id number]  Reason for Admission: fluid overload  Discharge Date: 22 RARS: Readmission Risk Score: 48.1 ( )      Last Discharge [facility]       Complaint Diagnosis Description Type Department Provider    22 Shortness of Breath; Other Acute on chronic respiratory failure with hypoxemia ([facility] [address] . .. ED to Hosp-Admission (Discharged) (ADMITTED) Do Ramsey MD; Sharath Lovell. .. Spoke with: harjeet    Second and final attempt made to reach patient for initial post hospital transition call. VM left stating purpose of call along with my contact information requesting a return call.       Care Transitions 24 Hour Call    Do you have all of your prescriptions and are they filled?: Yes  Do you have support at home?: Partner/Spouse/SO  Are you an active caregiver in your home?: No  Care Transitions Interventions         Follow Up  Future Appointments   Date Time Provider Nelly Darby   2022  2:15 PM JAY Laws - ILAN Aguirre RN

## 2022-07-12 NOTE — TELEPHONE ENCOUNTER
----- Message from Maycol Galindo sent at 7/12/2022  2:30 PM EDT -----  Subject: Appointment Request    Reason for Call: Established Patient Appointment needed: Urgent (Patient   Request) Hospital Follow Up    QUESTIONS    Reason for appointment request? No appointments available during search     Additional Information for Provider?  Patient was calling in to reschedule   appt he missed from hospital visit patient was seen on 7/4 for trouble   breathing wants him to get in right away if can please advise patient no   appts available during search   ---------------------------------------------------------------------------  --------------  [email or web address]  [phone]; OK to leave message on voicemail  ---------------------------------------------------------------------------  --------------  SCRIPT ANSWERS  COVID Screen: Macy Corona

## 2022-07-12 NOTE — TELEPHONE ENCOUNTER
2nd Attempt; No Answer- Left HIPAA compliant voicemail with Non-Urgent Heart Failure Resource Line number for call back.        Angelica Edward RN

## 2022-07-12 NOTE — TELEPHONE ENCOUNTER
1st Attempt; No Answer- Left HIPAA compliant voicemail with Non-Urgent Heart Failure Resource Line number for call back.        Laura Lozada RN

## 2022-07-14 NOTE — TELEPHONE ENCOUNTER
Attempted to call pt to reschedule hospital f/u. [facility] for pt to return call. I tentatively scheduled pt for 7/18/22 with LAMBERTO. This is a Monday appointment and pt may not be available at this time. Please ask pt if this appointment is ok. If not, okay to schedule hospital f/u with Tashi Rogers, [address], or [facility]. Soonest available.

## 2022-07-16 ENCOUNTER — APPOINTMENT (OUTPATIENT)
Dept: GENERAL RADIOLOGY | Age: 52
DRG: 291 | End: 2022-07-16
Payer: COMMERCIAL

## 2022-07-16 ENCOUNTER — HOSPITAL ENCOUNTER (EMERGENCY)
Age: 52
Discharge: HOME | DRG: 291 | End: 2022-07-16
Attending: EMERGENCY MEDICINE
Payer: COMMERCIAL

## 2022-07-16 VITALS
HEIGHT: 69 IN | HEART RATE: 87 BPM | OXYGEN SATURATION: 99 % | TEMPERATURE: 98 F | BODY MASS INDEX: 34.38 KG/M2 | DIASTOLIC BLOOD PRESSURE: 93 MMHG | SYSTOLIC BLOOD PRESSURE: 175 MMHG | RESPIRATION RATE: 13 BRPM

## 2022-07-16 DIAGNOSIS — J90 CHRONIC BILATERAL PLEURAL EFFUSIONS: Primary | ICD-10-CM

## 2022-07-16 DIAGNOSIS — N18.6 ESRD (END STAGE RENAL DISEASE) (HCC): ICD-10-CM

## 2022-07-16 DIAGNOSIS — I50.9 CHRONIC CONGESTIVE HEART FAILURE, UNSPECIFIED HEART FAILURE TYPE (HCC): ICD-10-CM

## 2022-07-16 LAB
A/G RATIO: 1.5 (ref 1.1–2.2)
ALBUMIN SERPL-MCNC: 4.3 G/DL (ref 3.4–5)
ALP BLD-CCNC: 94 U/L (ref 40–129)
ALT SERPL-CCNC: 8 U/L (ref 10–40)
ANION GAP SERPL CALCULATED.3IONS-SCNC: 13 MMOL/L (ref 3–16)
AST SERPL-CCNC: 11 U/L (ref 15–37)
BASOPHILS ABSOLUTE: 0 K/UL (ref 0–0.2)
BASOPHILS RELATIVE PERCENT: 0.4 %
BILIRUB SERPL-MCNC: <0.2 MG/DL (ref 0–1)
BUN BLDV-MCNC: 34 MG/DL (ref 7–20)
CALCIUM SERPL-MCNC: 8.8 MG/DL (ref 8.3–10.6)
CHLORIDE BLD-SCNC: 90 MMOL/L (ref 99–110)
CO2: 28 MMOL/L (ref 21–32)
CREAT SERPL-MCNC: 5 MG/DL (ref 0.9–1.3)
EKG ATRIAL RATE: 86 BPM
EKG DIAGNOSIS: NORMAL
EKG P AXIS: 75 DEGREES
EKG P-R INTERVAL: 176 MS
EKG Q-T INTERVAL: 392 MS
EKG QRS DURATION: 92 MS
EKG QTC CALCULATION (BAZETT): 469 MS
EKG R AXIS: -28 DEGREES
EKG T AXIS: 103 DEGREES
EKG VENTRICULAR RATE: 86 BPM
EOSINOPHILS ABSOLUTE: 0.1 K/UL (ref 0–0.6)
EOSINOPHILS RELATIVE PERCENT: 1.3 %
GFR AFRICAN AMERICAN: 15
GFR NON-AFRICAN AMERICAN: 12
GLUCOSE BLD-MCNC: 204 MG/DL (ref 70–99)
HCT VFR BLD CALC: 31.6 % (ref 40.5–52.5)
HEMOGLOBIN: 10.5 G/DL (ref 13.5–17.5)
LYMPHOCYTES ABSOLUTE: 0.5 K/UL (ref 1–5.1)
LYMPHOCYTES RELATIVE PERCENT: 5.7 %
MCH RBC QN AUTO: 29.9 PG (ref 26–34)
MCHC RBC AUTO-ENTMCNC: 33.2 G/DL (ref 31–36)
MCV RBC AUTO: 90.1 FL (ref 80–100)
MONOCYTES ABSOLUTE: 0.7 K/UL (ref 0–1.3)
MONOCYTES RELATIVE PERCENT: 6.8 %
NEUTROPHILS ABSOLUTE: 8.2 K/UL (ref 1.7–7.7)
NEUTROPHILS RELATIVE PERCENT: 85.8 %
PDW BLD-RTO: 16.1 % (ref 12.4–15.4)
PLATELET # BLD: 230 K/UL (ref 135–450)
PMV BLD AUTO: 8.1 FL (ref 5–10.5)
POTASSIUM REFLEX MAGNESIUM: 3.9 MMOL/L (ref 3.5–5.1)
PRO-BNP: ABNORMAL PG/ML (ref 0–124)
RBC # BLD: 3.51 M/UL (ref 4.2–5.9)
SODIUM BLD-SCNC: 131 MMOL/L (ref 136–145)
TOTAL PROTEIN: 7.1 G/DL (ref 6.4–8.2)
TROPONIN: 0.1 NG/ML
WBC # BLD: 9.6 K/UL (ref 4–11)

## 2022-07-16 PROCEDURE — 71045 X-RAY EXAM CHEST 1 VIEW: CPT

## 2022-07-16 PROCEDURE — 80053 COMPREHEN METABOLIC PANEL: CPT

## 2022-07-16 PROCEDURE — 93010 ELECTROCARDIOGRAM REPORT: CPT | Performed by: INTERNAL MEDICINE

## 2022-07-16 PROCEDURE — 85025 COMPLETE CBC W/AUTO DIFF WBC: CPT

## 2022-07-16 PROCEDURE — 84484 ASSAY OF TROPONIN QUANT: CPT

## 2022-07-16 PROCEDURE — 93005 ELECTROCARDIOGRAM TRACING: CPT | Performed by: EMERGENCY MEDICINE

## 2022-07-16 PROCEDURE — 99285 EMERGENCY DEPT VISIT HI MDM: CPT

## 2022-07-16 PROCEDURE — 83880 ASSAY OF NATRIURETIC PEPTIDE: CPT

## 2022-07-16 ASSESSMENT — PAIN SCALES - GENERAL: PAINLEVEL_OUTOF10: 10

## 2022-07-16 ASSESSMENT — PAIN DESCRIPTION - FREQUENCY: FREQUENCY: CONTINUOUS

## 2022-07-16 ASSESSMENT — PAIN - FUNCTIONAL ASSESSMENT
PAIN_FUNCTIONAL_ASSESSMENT: ACTIVITIES ARE NOT PREVENTED
PAIN_FUNCTIONAL_ASSESSMENT: NONE - DENIES PAIN
PAIN_FUNCTIONAL_ASSESSMENT: 0-10

## 2022-07-16 ASSESSMENT — PAIN DESCRIPTION - LOCATION: LOCATION: BACK

## 2022-07-16 ASSESSMENT — PAIN DESCRIPTION - DESCRIPTORS: DESCRIPTORS: DISCOMFORT

## 2022-07-16 ASSESSMENT — PAIN DESCRIPTION - PAIN TYPE: TYPE: CHRONIC PAIN

## 2022-07-16 ASSESSMENT — PAIN DESCRIPTION - ONSET: ONSET: ON-GOING

## 2022-07-16 NOTE — ED PROVIDER NOTES
Dr. Reji Montgomery [facility]) 04/19/2019    EF= 43%    CHF (congestive heart failure) ([address]     Chronic pain     COPD (chronic obstructive pulmonary disease) ([address]     pulmonology Dr. Doron Pablo    Depression     Diabetes mellitus ([address]     borderline    Difficult intravenous access     Emphysema of lung ([address]     ESRD (end stage renal disease) on dialysis ([address]     MWF    Fear of needles     Gastric ulcer     GERD (gastroesophageal reflux disease)     Heart valve problem     bicuspic valve    Hemodialysis patient ([address]     History of spinal fracture     work incident    Hx of blood clots     Bilateral lower extremities; stents in place    Hyperlipidemia     Hypertension     MI (myocardial infarction) ([address] 2019    has had 9 MIs. 2019 was the last    Neuromuscular disorder ([address]     due to CVA    Numbness and tingling in left arm     from fistula    Pneumonia     PONV (postoperative nausea and vomiting)     Prolonged emergence from general anesthesia     States requires more medication than most people    Sleep apnea     Uses CPAP    Stroke ([facility]     7mm thalamic cva 2017 deficts left side, left side weakness    TIA (transient ischemic attack)     Unspecified diseases of blood and blood-forming organs          SURGICAL HISTORY       Past Surgical History:   Procedure Laterality Date    AORTIC VALVE REPLACEMENT N/A 10/15/2019    TRANSCATHETER AORTIC VALVE REPLACEMENT FEMORAL APPROACH performed by Mirza Corey MD at [facility] Right 7/2/2019    PERITONEAL DIALYSIS CATHETER REMOVAL performed by Hilary Bacon MD at [facility]  2/29/2015    WN    CORONARY ANGIOPLASTY WITH STENT PLACEMENT  05/26/15    CYST REMOVAL  08/14/2013    EXCISION CYSTS, NECK X2 AND ABDOMINAL benign    DIAGNOSTIC CARDIAC CATH LAB PROCEDURE      DIALYSIS FISTULA CREATION Left 10/30/2017    LEFT BRACHIAL CEPHALIC FISTULA    DIALYSIS FISTULA CREATION Left 3/27/2019 LIGATION  AV FISTULA performed by Lanie Ray MD at [address] [facility], COLON, DIAGNOSTIC      IR TUNNELED [address] 5 YEARS  3/21/2022    IR TUNNELED CATHETER PLACEMENT GREATER THAN 5 YEARS 3/21/2022 [place] [facility]    IR TUNNELED CATHETER PLACEMENT GREATER THAN 5 YEARS  4/21/2022    IR TUNNELED CATHETER PLACEMENT GREATER THAN 5 YEARS 4/21/2022 [facility]    IR TUNNELED CATHETER PLACEMENT GREATER THAN 5 YEARS  4/26/2022    IR TUNNELED CATHETER PLACEMENT GREATER THAN 5 YEARS 4/26/2022 [facility]    IR TUNNELED CATHETER PLACEMENT GREATER THAN 5 YEARS  6/23/2022    IR TUNNELED CATHETER PLACEMENT GREATER THAN 5 YEARS 6/23/2022 [facility]    OTHER SURGICAL HISTORY  02/01/2017    laparoscopic cholecystectomy with intraoperative cholangiogram    OTHER SURGICAL HISTORY  2018    PORT PLACEMENT  - vas cath    OTHER SURGICAL HISTORY Bilateral 06/26/2018    laprascopic peritoneal dialysis catheter placement    OTHER SURGICAL HISTORY Right 09/2018    peritoneal dialysis port placed on right side of abdomen    OTHER SURGICAL HISTORY  05/28/2019    PTA/Stenting R External Iliac artery    [place] LAP INSERTION TUNNELED INTRAPERITONEAL CATHETER N/A 9/21/2018    LAPAROSCOPIC PERITONEAL DIALYSIS CATHETER REPLACEMENT performed by Megan Smith MD at [address] [place] [address] Pkw[address] 2/24/2022    PERINEAL ABCESS DRAINAGE performed by Megan Smith MD at [facility]. [address]  01/06/2016    UPPER GASTROINTESTINAL ENDOSCOPY  01/29/2017    possible candida, otherwise normal appearing    VASCULAR SURGERY  aprx 2 years ago    2 stents placed, each side of groin         CURRENT MEDICATIONS       Discharge Medication List as of 7/16/2022  2:38 AM        CONTINUE these medications which have NOT CHANGED    Details   traZODone (DESYREL) 100 MG tablet Take 100 mg by mouth nightlyHistorical Med oxyCODONE-acetaminophen (PERCOCET) 7.5-325 MG per tablet Take 1 tablet by mouth every 6 hours as needed (pain) for up to 30 days. , Disp-120 tablet, R-0Normal      QUEtiapine (SEROQUEL) 50 MG tablet TAKE 1 TABLET BY MOUTH EVERY EVENING, Disp-30 tablet, R-10Normal      isosorbide dinitrate (ISORDIL) 20 MG tablet Take 1 tablet by mouth 3 times daily, Disp-90 tablet, R-3Normal      clopidogrel (PLAVIX) 75 MG tablet Take 1 tablet by mouth daily, Disp-90 tablet, R-3Normal      cyclobenzaprine (FLEXERIL) 10 MG tablet TAKE 1 TABLET BY MOUTH EVERY 8 HOURS AS NEEDED, Disp-90 tablet, R-10Normal      pantoprazole (PROTONIX) 40 MG tablet TAKE 1 TABLET BY MOUTH EVERY MORNING BEFORE BREAKFAST, Disp-30 tablet, R-10Normal      docusate sodium (DOK) 100 MG capsule Take 1 capsule by mouth daily, Disp-30 capsule, R-5Normal      LINZESS 145 MCG capsule TAKE 1 CAPSULE BY MOUTH IN THE MORNING BEFORE BREAKFAST, Disp-30 capsule, R-10Normal      DULoxetine (CYMBALTA) 30 MG extended release capsule TAKE 1 CAPSULE BY MOUTH EVERY DAY, Disp-30 capsule, R-10Normal      mineral oil liquid Take 30 mLs by mouth daily as needed for Constipation, Oral, DAILY PRN Starting Wed 3/9/2022, Disp-300 mL, R-0, Normal      sennosides-docusate sodium (SENOKOT-S) 8.6-50 MG tablet Take 1 tablet by mouth daily, Disp-10 tablet, R-0Normal      metoprolol succinate (TOPROL XL) 50 MG extended release tablet Take 1 tablet by mouth in the morning and at bedtime, Disp-60 tablet, R-1Normal      apixaban (ELIQUIS) 5 MG TABS tablet Take 1 tablet by mouth 2 times daily, Disp-60 tablet, R-1Normal      pravastatin (PRAVACHOL) 40 MG tablet Take 1 tablet by mouth daily, Disp-90 tablet, R-3Normal      Continuous Blood Gluc Sensor (DEXCOM G6 SENSOR) MISC Every 10 days, Disp-9 each, R-3Print      Continuous Blood Gluc Transmit (DEXCOM G6 TRANSMITTER) MISC 1 each by Does not apply route every 3 months, Disp-1 each, R-3Print      Continuous Blood Gluc  (DEXCOM G6 ) ADAM 1 each by Does not apply route Daily with lunch, Disp-1 each, R-0Print      B Complex-C-Folic Acid (VIRT-CAPS) 1 MG CAPS TAKE 1 CAPSULE BY MOUTH EVERY DAY, Disp-90 capsule, R-1Normal      Calcium Acetate, Phos Binder, 667 MG CAPS TAKE 1 CAPSULE BY MOUTH THREE TIMES DAILY WITH MEALS, Disp-90 capsule, R-3Normal      nitroGLYCERIN (NITROSTAT) 0.4 MG SL tablet DISSOLVE 1 TABLET UNDER THE TONGUE AS NEEDED FOR CHEST PAIN EVERY 5 MINUTES UP TO 3 TIMES. IF NO RELIEF CALL 911., Disp-25 tablet, R-10Normal      vitamin D (ERGOCALCIFEROL) 39230 units CAPS capsule TK 1 C PO WEEKLY, R-11Historical Med      Tiotropium Bromide-Olodaterol (STIOLTO RESPIMAT) 2.5-2.5 MCG/ACT AERS Inhale 2 puffs into the lungs daily, Disp-2 Inhaler, R-02 samples given:  Lot #[id number], Exp 7/21 & Lot #[id number], Exp 7/21NO PRINT      Blood Glucose Monitoring Suppl ADAM Disp-1 Device, R-0, NormalUSE AS DIRECTED. Alcohol Swabs PADS Disp-300 each, R-3, NormalUSE AS DIRECTED      albuterol sulfate  (90 Base) MCG/ACT inhaler Inhale 2 puffs into the lungs every 6 hours as needed for Wheezing, Disp-1 Inhaler, R-3Print      ipratropium-albuterol (DUONEB) 0.5-2.5 (3) MG/3ML SOLN nebulizer solution Inhale 3 mLs into the lungs every 6 hours as needed for Shortness of Breath, Disp-360 mL, R-1Print      calcium carbonate (TUMS) 500 MG chewable tablet Take 1 tablet by mouth 3 times daily as needed for Heartburn.              ALLERGIES     Morphine    FAMILY HISTORY       Family History   Problem Relation Age of Onset    Diabetes Mother     Heart Disease Father     Kidney Disease Sister         stage 4-kidney failure    Cancer Sister     Heart Disease Sister     Obesity Sister     Cancer Sister     Heart Disease Sister     Obesity Sister     Alcohol Abuse Brother           SOCIAL HISTORY       Social History     Socioeconomic History    Marital status:      Spouse name: None    Number of children: None    Years of education: None    Highest education level: None   Tobacco Use    Smoking status: Former     Packs/day: 0.50     Years: 33.00     Pack years: 16.50     Types: Cigarettes     Quit date: 2020     Years since quittin.2    Smokeless tobacco: Never    Tobacco comments:     [facility] quit 2021   Vaping Use    Vaping Use: Never used   Substance and Sexual Activity    Alcohol use: Not Currently     Alcohol/week: 0.0 standard drinks     Comment: occ    Drug use: No    Sexual activity: Yes     Partners: Female     Comment:        SCREENINGS    [place] Coma Scale  Eye Opening: Spontaneous  Best Verbal Response: Oriented  Best Motor Response: Obeys commands  Robert Coma Scale Score: 15          PHYSICAL EXAM    (up to 7 for level 4, 8 or more for level 5)     ED Triage Vitals [22 0001]   BP Temp Temp Source Heart Rate Resp SpO2 Height Weight   (!) 185/87 98 °F (36.7 °C) Oral 90 16 100 % 5' 9\" (1.753 m) --       Physical Exam    General appearance:  Cooperative. Chronically ill-appearing male with some increased work of breathing eye:  Extraocular movements intact. Ears, nose, mouth and throat:  Oral mucosa moist,  Neck:  Trachea midline. Heart: Tachycardic but regular  Perfusion:  intact  Respiratory: Overall quite clear lungs with slight increased respiratory rate. Speaking in full sentences  Abdominal:   Non distended. Nontender  Neurological:  Alert and oriented x 3.   Moves all extremities spontaneously  Musculoskeletal:   Normal ROM, no deformities          Psychiatric:  Normal mood      DIAGNOSTIC RESULTS       Labs Reviewed   CBC WITH AUTO DIFFERENTIAL - Abnormal; Notable for the following components:       Result Value    RBC 3.51 (*)     Hemoglobin 10.5 (*)     Hematocrit 31.6 (*)     RDW 16.1 (*)     Neutrophils Absolute 8.2 (*)     Lymphocytes Absolute 0.5 (*)     All other components within normal limits   COMPREHENSIVE METABOLIC PANEL W/ REFLEX TO MG FOR LOW K - Abnormal; Notable for the following components:    Sodium 131 (*)     Chloride 90 (*)     Glucose 204 (*)     BUN 34 (*)     CREATININE 5.0 (*)     GFR Non- 12 (*)     GFR  15 (*)     ALT 8 (*)     AST 11 (*)     All other components within normal limits   TROPONIN - Abnormal; Notable for the following components:    Troponin 0.10 (*)     All other components within normal limits   BRAIN NATRIURETIC PEPTIDE - Abnormal; Notable for the following components:    Pro-BNP >70,000 (*)     All other components within normal limits   PROTIME-INR   APTT       Interpretation per the Radiologist below, if obtained/available at the time of this note:    XR CHEST PORTABLE   Final Result   Moderate left basilar opacities suggest airspace disease and left-sided   effusion. Cardiomegaly with mild perihilar congestion. All other labs/imaging were within normal range or not returned as of this dictation. EMERGENCY DEPARTMENT COURSE and DIFFERENTIAL DIAGNOSIS/MDM:   Vitals:    Vitals:    07/16/22 0030 07/16/22 0045 07/16/22 0211 07/16/22 0212   BP:    (!) 175/93   Pulse: 84 84 86 87   Resp: 16 17 16 13   Temp:       TempSrc:       SpO2: 100% 100% 95% 99%   Height:           Patient presents emergency department today for shortness of breath. Multiple frequent visits for the same. Just completed a course of dialysis. Was hypertensive on arrival with some increased respiratory rate but overall fairly clear lung sounds. No new ischemic changes on EKG. Chronically elevated troponin unchanged. Chronically elevated BNP unchanged. Chest x-ray with bilateral effusions and cardiomegaly with perihilar congestion which is also chronic and well-documented on multiple CT scans and chest x-rays previously. While here, his tachycardia resolved. His respiratory rate lowered to the mid teens. He was satting 95 to 100% on his baseline home oxygen.   At present, I did not feel the need to admit the patient to the hospital as his exam and is unchanged from his baseline symptoms. MDM      CONSULTS     None    Critical Care:   None    REASSESSMENT          PROCEDURE     Unless otherwise noted below, none     Procedures      FINAL IMPRESSION      1. Chronic bilateral pleural effusions    2. ESRD (end stage renal disease) ([facility] [address]    3. Chronic congestive heart failure, unspecified heart failure type [facility])            DISPOSITION/PLAN   DISPOSITION Decision To Discharge 07/16/2022 02:32:04 AM        PATIENT REFERRED TO:  Gypsy Joseph [address] [place] [address] [phone]    Schedule an appointment as soon as possible for a visit       Iona Moreno MD  [address]  [phone]    Schedule an appointment as soon as possible for a visit   Please call for outpatient pulmonology follow up regarding your chronic pleural effusions      DISCHARGE MEDICATIONS:  Discharge Medication List as of 7/16/2022  2:38 AM        Controlled Substances Monitoring:     RX Monitoring 8/4/2017   Attestation The Prescription Monitoring Report for this patient was reviewed today. Periodic Controlled Substance Monitoring No signs of potential drug abuse or diversion identified.        (Please note that portions of this note were completed with a voice recognition program.  Efforts were made to edit the dictations but occasionally words are mis-transcribed.)    Bárbara Gomez MD (electronically signed)  Attending Emergency Physician            Kenji Cisneros MD  07/18/22 [id number]

## 2022-07-17 ENCOUNTER — HOSPITAL ENCOUNTER (INPATIENT)
Age: 52
LOS: 4 days | Discharge: NURSING, REHAB OR LONG-TERM CARE | DRG: 291 | End: 2022-07-21
Attending: EMERGENCY MEDICINE | Admitting: INTERNAL MEDICINE
Payer: COMMERCIAL

## 2022-07-17 ENCOUNTER — APPOINTMENT (OUTPATIENT)
Dept: GENERAL RADIOLOGY | Age: 52
DRG: 291 | End: 2022-07-17
Payer: COMMERCIAL

## 2022-07-17 DIAGNOSIS — G89.4 CHRONIC PAIN SYNDROME: ICD-10-CM

## 2022-07-17 DIAGNOSIS — I16.1 HYPERTENSIVE EMERGENCY: ICD-10-CM

## 2022-07-17 DIAGNOSIS — I50.9 ACUTE ON CHRONIC CONGESTIVE HEART FAILURE, UNSPECIFIED HEART FAILURE TYPE (HCC): Primary | ICD-10-CM

## 2022-07-17 PROBLEM — R06.02 SOB (SHORTNESS OF BREATH): Status: ACTIVE | Noted: 2022-07-17

## 2022-07-17 LAB
A/G RATIO: 1.3 (ref 1.1–2.2)
ALBUMIN SERPL-MCNC: 4.1 G/DL (ref 3.4–5)
ALP BLD-CCNC: 95 U/L (ref 40–129)
ALT SERPL-CCNC: 8 U/L (ref 10–40)
ANION GAP SERPL CALCULATED.3IONS-SCNC: 16 MMOL/L (ref 3–16)
APTT: 33.1 SEC (ref 23–34.3)
AST SERPL-CCNC: 16 U/L (ref 15–37)
BASE EXCESS VENOUS: 0.5 MMOL/L (ref -3–3)
BASOPHILS ABSOLUTE: 0.1 K/UL (ref 0–0.2)
BASOPHILS RELATIVE PERCENT: 0.7 %
BILIRUB SERPL-MCNC: 0.3 MG/DL (ref 0–1)
BUN BLDV-MCNC: 48 MG/DL (ref 7–20)
CALCIUM SERPL-MCNC: 9 MG/DL (ref 8.3–10.6)
CARBOXYHEMOGLOBIN: 1.7 % (ref 0–1.5)
CHLORIDE BLD-SCNC: 90 MMOL/L (ref 99–110)
CO2: 25 MMOL/L (ref 21–32)
CREAT SERPL-MCNC: 6.5 MG/DL (ref 0.9–1.3)
EOSINOPHILS ABSOLUTE: 0.3 K/UL (ref 0–0.6)
EOSINOPHILS RELATIVE PERCENT: 2.5 %
GFR AFRICAN AMERICAN: 11
GFR NON-AFRICAN AMERICAN: 9
GLUCOSE BLD-MCNC: 152 MG/DL (ref 70–99)
GLUCOSE BLD-MCNC: 212 MG/DL (ref 70–99)
GLUCOSE BLD-MCNC: 320 MG/DL (ref 70–99)
GLUCOSE BLD-MCNC: 320 MG/DL (ref 70–99)
GLUCOSE BLD-MCNC: 355 MG/DL (ref 70–99)
HCO3 VENOUS: 26.2 MMOL/L (ref 23–29)
HCT VFR BLD CALC: 32.5 % (ref 40.5–52.5)
HEMOGLOBIN: 10.6 G/DL (ref 13.5–17.5)
INR BLD: 1.37 (ref 0.87–1.14)
LYMPHOCYTES ABSOLUTE: 0.6 K/UL (ref 1–5.1)
LYMPHOCYTES RELATIVE PERCENT: 4.7 %
MCH RBC QN AUTO: 29.4 PG (ref 26–34)
MCHC RBC AUTO-ENTMCNC: 32.5 G/DL (ref 31–36)
MCV RBC AUTO: 90.5 FL (ref 80–100)
METHEMOGLOBIN VENOUS: 0.4 %
MONOCYTES ABSOLUTE: 0.9 K/UL (ref 0–1.3)
MONOCYTES RELATIVE PERCENT: 7.2 %
NEUTROPHILS ABSOLUTE: 10.1 K/UL (ref 1.7–7.7)
NEUTROPHILS RELATIVE PERCENT: 84.9 %
O2 SAT, VEN: 63 %
O2 THERAPY: ABNORMAL
PCO2, VEN: 46.8 MMHG (ref 40–50)
PDW BLD-RTO: 16.4 % (ref 12.4–15.4)
PERFORMED ON: ABNORMAL
PH VENOUS: 7.37 (ref 7.35–7.45)
PLATELET # BLD: 246 K/UL (ref 135–450)
PMV BLD AUTO: 7.9 FL (ref 5–10.5)
PO2, VEN: 35.2 MMHG (ref 25–40)
POTASSIUM REFLEX MAGNESIUM: 4.3 MMOL/L (ref 3.5–5.1)
PRO-BNP: ABNORMAL PG/ML (ref 0–124)
PROTHROMBIN TIME: 16.7 SEC (ref 11.7–14.5)
RBC # BLD: 3.6 M/UL (ref 4.2–5.9)
SODIUM BLD-SCNC: 131 MMOL/L (ref 136–145)
TCO2 CALC VENOUS: 28 MMOL/L
TOTAL PROTEIN: 7.3 G/DL (ref 6.4–8.2)
TROPONIN: 0.1 NG/ML
TROPONIN: 0.12 NG/ML
TROPONIN: 0.12 NG/ML
WBC # BLD: 11.9 K/UL (ref 4–11)

## 2022-07-17 PROCEDURE — 5A1D70Z PERFORMANCE OF URINARY FILTRATION, INTERMITTENT, LESS THAN 6 HOURS PER DAY: ICD-10-PCS | Performed by: INTERNAL MEDICINE

## 2022-07-17 PROCEDURE — 94761 N-INVAS EAR/PLS OXIMETRY MLT: CPT

## 2022-07-17 PROCEDURE — 36415 COLL VENOUS BLD VENIPUNCTURE: CPT

## 2022-07-17 PROCEDURE — 6370000000 HC RX 637 (ALT 250 FOR IP): Performed by: NURSE PRACTITIONER

## 2022-07-17 PROCEDURE — 85610 PROTHROMBIN TIME: CPT

## 2022-07-17 PROCEDURE — 99285 EMERGENCY DEPT VISIT HI MDM: CPT

## 2022-07-17 PROCEDURE — 2060000000 HC ICU INTERMEDIATE R&B

## 2022-07-17 PROCEDURE — 90935 HEMODIALYSIS ONE EVALUATION: CPT

## 2022-07-17 PROCEDURE — 6370000000 HC RX 637 (ALT 250 FOR IP): Performed by: INTERNAL MEDICINE

## 2022-07-17 PROCEDURE — 83880 ASSAY OF NATRIURETIC PEPTIDE: CPT

## 2022-07-17 PROCEDURE — 82803 BLOOD GASES ANY COMBINATION: CPT

## 2022-07-17 PROCEDURE — 96374 THER/PROPH/DIAG INJ IV PUSH: CPT

## 2022-07-17 PROCEDURE — 99222 1ST HOSP IP/OBS MODERATE 55: CPT | Performed by: INTERNAL MEDICINE

## 2022-07-17 PROCEDURE — 6360000002 HC RX W HCPCS: Performed by: INTERNAL MEDICINE

## 2022-07-17 PROCEDURE — 2700000000 HC OXYGEN THERAPY PER DAY

## 2022-07-17 PROCEDURE — 85025 COMPLETE CBC W/AUTO DIFF WBC: CPT

## 2022-07-17 PROCEDURE — 1200000000 HC SEMI PRIVATE

## 2022-07-17 PROCEDURE — 85730 THROMBOPLASTIN TIME PARTIAL: CPT

## 2022-07-17 PROCEDURE — 6360000002 HC RX W HCPCS: Performed by: EMERGENCY MEDICINE

## 2022-07-17 PROCEDURE — 71045 X-RAY EXAM CHEST 1 VIEW: CPT

## 2022-07-17 PROCEDURE — 94660 CPAP INITIATION&MGMT: CPT

## 2022-07-17 PROCEDURE — 84484 ASSAY OF TROPONIN QUANT: CPT

## 2022-07-17 PROCEDURE — 99223 1ST HOSP IP/OBS HIGH 75: CPT | Performed by: INTERNAL MEDICINE

## 2022-07-17 PROCEDURE — 96375 TX/PRO/DX INJ NEW DRUG ADDON: CPT

## 2022-07-17 PROCEDURE — 93005 ELECTROCARDIOGRAM TRACING: CPT | Performed by: EMERGENCY MEDICINE

## 2022-07-17 PROCEDURE — 80061 LIPID PANEL: CPT

## 2022-07-17 PROCEDURE — 94640 AIRWAY INHALATION TREATMENT: CPT

## 2022-07-17 PROCEDURE — 6370000000 HC RX 637 (ALT 250 FOR IP): Performed by: EMERGENCY MEDICINE

## 2022-07-17 PROCEDURE — 2500000003 HC RX 250 WO HCPCS: Performed by: EMERGENCY MEDICINE

## 2022-07-17 PROCEDURE — 2500000003 HC RX 250 WO HCPCS: Performed by: NURSE PRACTITIONER

## 2022-07-17 PROCEDURE — 80053 COMPREHEN METABOLIC PANEL: CPT

## 2022-07-17 RX ORDER — ACETAMINOPHEN 325 MG/1
650 TABLET ORAL EVERY 6 HOURS PRN
Status: DISCONTINUED | OUTPATIENT
Start: 2022-07-17 | End: 2022-07-21 | Stop reason: HOSPADM

## 2022-07-17 RX ORDER — GABAPENTIN 100 MG/1
CAPSULE ORAL
Qty: 180 CAPSULE | Refills: 10 | OUTPATIENT
Start: 2022-07-17

## 2022-07-17 RX ORDER — ISOSORBIDE DINITRATE 20 MG/1
20 TABLET ORAL 3 TIMES DAILY
Status: DISCONTINUED | OUTPATIENT
Start: 2022-07-17 | End: 2022-07-21 | Stop reason: HOSPADM

## 2022-07-17 RX ORDER — TRAZODONE HYDROCHLORIDE 50 MG/1
100 TABLET ORAL NIGHTLY
Status: DISCONTINUED | OUTPATIENT
Start: 2022-07-17 | End: 2022-07-21 | Stop reason: HOSPADM

## 2022-07-17 RX ORDER — IPRATROPIUM BROMIDE AND ALBUTEROL SULFATE 2.5; .5 MG/3ML; MG/3ML
1 SOLUTION RESPIRATORY (INHALATION) EVERY 6 HOURS PRN
Status: DISCONTINUED | OUTPATIENT
Start: 2022-07-17 | End: 2022-07-21 | Stop reason: HOSPADM

## 2022-07-17 RX ORDER — DEXTROSE MONOHYDRATE 50 MG/ML
100 INJECTION, SOLUTION INTRAVENOUS PRN
Status: DISCONTINUED | OUTPATIENT
Start: 2022-07-17 | End: 2022-07-21 | Stop reason: HOSPADM

## 2022-07-17 RX ORDER — OXYCODONE AND ACETAMINOPHEN 7.5; 325 MG/1; MG/1
1 TABLET ORAL ONCE
Status: COMPLETED | OUTPATIENT
Start: 2022-07-17 | End: 2022-07-17

## 2022-07-17 RX ORDER — INSULIN LISPRO 100 [IU]/ML
0-6 INJECTION, SOLUTION INTRAVENOUS; SUBCUTANEOUS
Status: DISCONTINUED | OUTPATIENT
Start: 2022-07-17 | End: 2022-07-21 | Stop reason: HOSPADM

## 2022-07-17 RX ORDER — OXYCODONE AND ACETAMINOPHEN 7.5; 325 MG/1; MG/1
1 TABLET ORAL EVERY 6 HOURS PRN
Status: DISCONTINUED | OUTPATIENT
Start: 2022-07-17 | End: 2022-07-21 | Stop reason: HOSPADM

## 2022-07-17 RX ORDER — LABETALOL HYDROCHLORIDE 5 MG/ML
10 INJECTION, SOLUTION INTRAVENOUS EVERY 4 HOURS PRN
Status: DISCONTINUED | OUTPATIENT
Start: 2022-07-17 | End: 2022-07-21 | Stop reason: HOSPADM

## 2022-07-17 RX ORDER — FUROSEMIDE 10 MG/ML
60 INJECTION INTRAMUSCULAR; INTRAVENOUS ONCE
Status: COMPLETED | OUTPATIENT
Start: 2022-07-17 | End: 2022-07-17

## 2022-07-17 RX ORDER — SODIUM CHLORIDE 0.9 % (FLUSH) 0.9 %
5-40 SYRINGE (ML) INJECTION PRN
Status: DISCONTINUED | OUTPATIENT
Start: 2022-07-17 | End: 2022-07-21 | Stop reason: HOSPADM

## 2022-07-17 RX ORDER — LABETALOL HYDROCHLORIDE 5 MG/ML
15 INJECTION, SOLUTION INTRAVENOUS ONCE
Status: COMPLETED | OUTPATIENT
Start: 2022-07-17 | End: 2022-07-17

## 2022-07-17 RX ORDER — CLOPIDOGREL BISULFATE 75 MG/1
75 TABLET ORAL DAILY
Status: DISCONTINUED | OUTPATIENT
Start: 2022-07-17 | End: 2022-07-21 | Stop reason: HOSPADM

## 2022-07-17 RX ORDER — CYCLOBENZAPRINE HCL 10 MG
5 TABLET ORAL 3 TIMES DAILY PRN
Status: DISCONTINUED | OUTPATIENT
Start: 2022-07-17 | End: 2022-07-21 | Stop reason: HOSPADM

## 2022-07-17 RX ORDER — INSULIN LISPRO 100 [IU]/ML
0-3 INJECTION, SOLUTION INTRAVENOUS; SUBCUTANEOUS NIGHTLY
Status: DISCONTINUED | OUTPATIENT
Start: 2022-07-17 | End: 2022-07-21 | Stop reason: HOSPADM

## 2022-07-17 RX ORDER — HYDRALAZINE HYDROCHLORIDE 20 MG/ML
20 INJECTION INTRAMUSCULAR; INTRAVENOUS EVERY 6 HOURS PRN
Status: DISCONTINUED | OUTPATIENT
Start: 2022-07-17 | End: 2022-07-21 | Stop reason: HOSPADM

## 2022-07-17 RX ORDER — HEPARIN SODIUM 1000 [USP'U]/ML
3600 INJECTION, SOLUTION INTRAVENOUS; SUBCUTANEOUS PRN
Status: DISCONTINUED | OUTPATIENT
Start: 2022-07-17 | End: 2022-07-21 | Stop reason: HOSPADM

## 2022-07-17 RX ORDER — SODIUM CHLORIDE 9 MG/ML
INJECTION, SOLUTION INTRAVENOUS PRN
Status: DISCONTINUED | OUTPATIENT
Start: 2022-07-17 | End: 2022-07-21 | Stop reason: HOSPADM

## 2022-07-17 RX ORDER — ACETAMINOPHEN 650 MG/1
650 SUPPOSITORY RECTAL EVERY 6 HOURS PRN
Status: DISCONTINUED | OUTPATIENT
Start: 2022-07-17 | End: 2022-07-21 | Stop reason: HOSPADM

## 2022-07-17 RX ORDER — PROCHLORPERAZINE EDISYLATE 5 MG/ML
10 INJECTION INTRAMUSCULAR; INTRAVENOUS EVERY 6 HOURS PRN
Status: DISCONTINUED | OUTPATIENT
Start: 2022-07-17 | End: 2022-07-21 | Stop reason: HOSPADM

## 2022-07-17 RX ORDER — PANTOPRAZOLE SODIUM 40 MG/1
40 TABLET, DELAYED RELEASE ORAL
Status: DISCONTINUED | OUTPATIENT
Start: 2022-07-17 | End: 2022-07-21 | Stop reason: HOSPADM

## 2022-07-17 RX ORDER — ALBUTEROL SULFATE 90 UG/1
2 AEROSOL, METERED RESPIRATORY (INHALATION) EVERY 6 HOURS PRN
Status: DISCONTINUED | OUTPATIENT
Start: 2022-07-17 | End: 2022-07-21 | Stop reason: HOSPADM

## 2022-07-17 RX ORDER — METOPROLOL SUCCINATE 50 MG/1
50 TABLET, EXTENDED RELEASE ORAL 2 TIMES DAILY
Status: DISCONTINUED | OUTPATIENT
Start: 2022-07-17 | End: 2022-07-20

## 2022-07-17 RX ORDER — PRAVASTATIN SODIUM 40 MG
40 TABLET ORAL DAILY
Status: DISCONTINUED | OUTPATIENT
Start: 2022-07-17 | End: 2022-07-21 | Stop reason: HOSPADM

## 2022-07-17 RX ORDER — SODIUM CHLORIDE 0.9 % (FLUSH) 0.9 %
5-40 SYRINGE (ML) INJECTION EVERY 12 HOURS SCHEDULED
Status: DISCONTINUED | OUTPATIENT
Start: 2022-07-17 | End: 2022-07-21 | Stop reason: HOSPADM

## 2022-07-17 RX ADMIN — SACUBITRIL AND VALSARTAN 1 TABLET: 24; 26 TABLET, FILM COATED ORAL at 21:54

## 2022-07-17 RX ADMIN — OXYCODONE AND ACETAMINOPHEN 1 TABLET: 7.5; 325 TABLET ORAL at 17:42

## 2022-07-17 RX ADMIN — METOPROLOL SUCCINATE 50 MG: 50 TABLET, EXTENDED RELEASE ORAL at 21:55

## 2022-07-17 RX ADMIN — OXYCODONE AND ACETAMINOPHEN 1 TABLET: 7.5; 325 TABLET ORAL at 06:20

## 2022-07-17 RX ADMIN — ISOSORBIDE DINITRATE 20 MG: 20 TABLET ORAL at 21:55

## 2022-07-17 RX ADMIN — FUROSEMIDE 60 MG: 10 INJECTION, SOLUTION INTRAMUSCULAR; INTRAVENOUS at 04:10

## 2022-07-17 RX ADMIN — LABETALOL HYDROCHLORIDE 15 MG: 5 INJECTION INTRAVENOUS at 04:11

## 2022-07-17 RX ADMIN — SACUBITRIL AND VALSARTAN 1 TABLET: 24; 26 TABLET, FILM COATED ORAL at 17:42

## 2022-07-17 RX ADMIN — TIOTROPIUM BROMIDE AND OLODATEROL 2 PUFF: 3.124; 2.736 SPRAY, METERED RESPIRATORY (INHALATION) at 07:41

## 2022-07-17 RX ADMIN — ISOSORBIDE DINITRATE 20 MG: 20 TABLET ORAL at 17:42

## 2022-07-17 RX ADMIN — OXYCODONE AND ACETAMINOPHEN 1 TABLET: 7.5; 325 TABLET ORAL at 04:31

## 2022-07-17 RX ADMIN — HEPARIN SODIUM 3600 UNITS: 1000 INJECTION INTRAVENOUS; SUBCUTANEOUS at 12:39

## 2022-07-17 RX ADMIN — TRAZODONE HYDROCHLORIDE 100 MG: 50 TABLET ORAL at 21:54

## 2022-07-17 RX ADMIN — PANTOPRAZOLE SODIUM 40 MG: 40 TABLET, DELAYED RELEASE ORAL at 06:46

## 2022-07-17 RX ADMIN — APIXABAN 2.5 MG: 2.5 TABLET, FILM COATED ORAL at 21:54

## 2022-07-17 RX ADMIN — LABETALOL HYDROCHLORIDE 10 MG: 5 INJECTION INTRAVENOUS at 06:20

## 2022-07-17 ASSESSMENT — PAIN DESCRIPTION - ONSET: ONSET: ON-GOING

## 2022-07-17 ASSESSMENT — PAIN DESCRIPTION - LOCATION
LOCATION: BACK
LOCATION: GENERALIZED

## 2022-07-17 ASSESSMENT — PAIN SCALES - GENERAL
PAINLEVEL_OUTOF10: 7
PAINLEVEL_OUTOF10: 10
PAINLEVEL_OUTOF10: 10
PAINLEVEL_OUTOF10: 7
PAINLEVEL_OUTOF10: 6
PAINLEVEL_OUTOF10: 7
PAINLEVEL_OUTOF10: 4

## 2022-07-17 ASSESSMENT — PAIN - FUNCTIONAL ASSESSMENT
PAIN_FUNCTIONAL_ASSESSMENT: 0-10
PAIN_FUNCTIONAL_ASSESSMENT: ACTIVITIES ARE NOT PREVENTED

## 2022-07-17 ASSESSMENT — PAIN DESCRIPTION - ORIENTATION: ORIENTATION: LOWER

## 2022-07-17 ASSESSMENT — PAIN SCALES - WONG BAKER
WONGBAKER_NUMERICALRESPONSE: 0

## 2022-07-17 ASSESSMENT — PAIN DESCRIPTION - DESCRIPTORS: DESCRIPTORS: DISCOMFORT;ACHING

## 2022-07-17 ASSESSMENT — ENCOUNTER SYMPTOMS: TACHYPNEA: 1

## 2022-07-17 ASSESSMENT — PAIN DESCRIPTION - PAIN TYPE: TYPE: CHRONIC PAIN

## 2022-07-17 ASSESSMENT — PAIN DESCRIPTION - FREQUENCY: FREQUENCY: CONTINUOUS

## 2022-07-17 NOTE — CONSULTS
Called and placed a urgent consult for pt regarding hypertensive emergency, pleural effusions on 7/17 @ 10:19AM Johny Chowdary

## 2022-07-17 NOTE — ED PROVIDER NOTES
[facility] C4 PCU  EMERGENCY DEPARTMENT ENCOUNTER      Pt Name: Gonzalez Angel  MRN: [id number]  [place] 1970  Date of evaluation: 7/17/2022  Provider: Rakan Putnam MD    CHIEF COMPLAINT       Chief Complaint   Patient presents with    Shortness of Breath     Seen multiple time for this, \"wants the fluids off is lungs\"  states \"I'm not going home you have to do something\"            HISTORY OF PRESENT ILLNESS   (Location/Symptom, Timing/Onset, Context/Setting, Quality, Duration, Modifying Factors, Severity)  Note limiting factors. Gonzalez Angel is a 45 y.o. male with past medical history of hypertension, hyperlipidemia, diabetes, coronary artery disease, CHF and end-stage renal disease on hemodialysis with frequent visits to the hospital for shortness of breath complaining of the same. Patient presents emergency department today complaining of shortness of breath. Compliant with his dialysis and last had treatment just over a day ago. Seen in the hospital just over a day ago as well and was ultimately discharged home. States he cannot lay flat he cannot sleep. He is short of breath but denies cough or hemoptysis. No chest pain. States he has been taking all of his medications. Denies peripheral edema. No abdominal pain. HPI    Nursing Notes were reviewed. REVIEW OF SYSTEMS    (2-9 systems for level 4, 10 or more for level 5)     Review of Systems    Please see HPI for pertinent positive and negative review of system findings. A full 10 system ROS was performed and otherwise negative.         PAST MEDICAL HISTORY     Past Medical History:   Diagnosis Date    Ambulatory dysfunction     walker for long distances, SOB with distance    Aortic stenosis     echo 2017    Arthritis     hands and hips    Asthma     Bilateral hilar adenopathy syndrome 6/3/2013    CAD (coronary artery disease)     Dr. Reji [facility]) 04/19/2019    EF= 43%    CHF (congestive heart failure) ([address]     Chronic pain     COPD (chronic obstructive pulmonary disease) ([address]     pulmonology Dr. Nitin Molina    Depression     Diabetes mellitus ([address]     borderline    Difficult intravenous access     Emphysema of lung ([address]     ESRD (end stage renal disease) on dialysis ([address]     MWF    Fear of needles     Gastric ulcer     GERD (gastroesophageal reflux disease)     Heart valve problem     bicuspic valve    Hemodialysis patient ([address]     History of spinal fracture     work incident    Hx of blood clots     Bilateral lower extremities; stents in place    Hyperlipidemia     Hypertension     MI (myocardial infarction) ([address] 2019    has had 9 MIs. 2019 was the last    Neuromuscular disorder ([address]     due to CVA    Numbness and tingling in left arm     from fistula    Pneumonia     PONV (postoperative nausea and vomiting)     Prolonged emergence from general anesthesia     States requires more medication than most people    Sleep apnea     Uses CPAP    Stroke ([address]     7mm thalamic cva 2017 deficts left side, left side weakness    TIA (transient ischemic attack)     Unspecified diseases of blood and blood-forming organs          SURGICAL HISTORY       Past Surgical History:   Procedure Laterality Date    AORTIC VALVE REPLACEMENT N/A 10/15/2019    TRANSCATHETER AORTIC VALVE REPLACEMENT FEMORAL APPROACH performed by Jesus Patrick MD at [address] [facility] Right 7/2/2019    PERITONEAL DIALYSIS CATHETER REMOVAL performed by Nathan Frazier MD at [address] [place] [address]  2/29/2015    [facility]    CORONARY ANGIOPLASTY WITH STENT PLACEMENT  05/26/15    CYST REMOVAL  08/14/2013    EXCISION CYSTS, NECK X2 AND ABDOMINAL benign    DIAGNOSTIC CARDIAC CATH LAB PROCEDURE      DIALYSIS FISTULA CREATION Left 10/30/2017    LEFT BRACHIAL CEPHALIC FISTULA    DIALYSIS FISTULA CREATION Left 3/27/2019    LIGATION  AV FISTULA performed by Henry Quesada MD at [facility], COLON, DIAGNOSTIC IR TUNNELED CATHETER PLACEMENT GREATER THAN 5 YEARS  3/21/2022    IR TUNNELED CATHETER PLACEMENT GREATER THAN 5 YEARS 3/21/2022 [facility]    IR TUNNELED CATHETER PLACEMENT GREATER THAN 5 YEARS  4/21/2022    IR TUNNELED CATHETER PLACEMENT GREATER THAN 5 YEARS 4/21/2022 [facility]    IR TUNNELED CATHETER PLACEMENT GREATER THAN 5 YEARS  4/26/2022    IR TUNNELED CATHETER PLACEMENT GREATER THAN 5 YEARS 4/26/2022 [facility]    IR TUNNELED CATHETER PLACEMENT GREATER THAN 5 YEARS  6/23/2022    IR TUNNELED CATHETER PLACEMENT GREATER THAN 5 YEARS 6/23/2022 [facility]    OTHER SURGICAL HISTORY  02/01/2017    laparoscopic cholecystectomy with intraoperative cholangiogram    OTHER SURGICAL HISTORY  2018    PORT PLACEMENT  - vas cath    OTHER SURGICAL HISTORY Bilateral 06/26/2018    laprascopic peritoneal dialysis catheter placement    OTHER SURGICAL HISTORY Right 09/2018    peritoneal dialysis port placed on right side of abdomen    OTHER SURGICAL HISTORY  05/28/2019    PTA/Stenting R External Iliac artery    MD LAP INSERTION TUNNELED INTRAPERITONEAL CATHETER N/A 9/21/2018    LAPAROSCOPIC PERITONEAL DIALYSIS CATHETER REPLACEMENT performed by Adali Cerrato MD at [address] [place] [address] Pkwy 2/24/2022    PERINEAL ABCESS DRAINAGE performed by Adali Cerrato MD at [address] [facility] [address]  01/06/2016    UPPER GASTROINTESTINAL ENDOSCOPY  01/29/2017    possible candida, otherwise normal appearing    VASCULAR SURGERY  aprx 2 years ago    2 stents placed, each side of groin         CURRENT MEDICATIONS       Current Discharge Medication List        CONTINUE these medications which have NOT CHANGED    Details   traZODone (DESYREL) 100 MG tablet Take 100 mg by mouth nightly      oxyCODONE-acetaminophen (PERCOCET) 7.5-325 MG per tablet Take 1 tablet by mouth every 6 hours as needed (pain) for up to 30 days.   Qty: 120 tablet, Refills: 0    Comments: Reduce doses taken as pain becomes manageable  Associated Diagnoses: Chronic pain syndrome      QUEtiapine (SEROQUEL) 50 MG tablet TAKE 1 TABLET BY MOUTH EVERY EVENING  Qty: 30 tablet, Refills: 10    Associated Diagnoses: Insomnia, unspecified type; Mood disorder (HCC)      isosorbide dinitrate (ISORDIL) 20 MG tablet Take 1 tablet by mouth 3 times daily  Qty: 90 tablet, Refills: 3      clopidogrel (PLAVIX) 75 MG tablet Take 1 tablet by mouth daily  Qty: 90 tablet, Refills: 3      cyclobenzaprine (FLEXERIL) 10 MG tablet TAKE 1 TABLET BY MOUTH EVERY 8 HOURS AS NEEDED  Qty: 90 tablet, Refills: 10    Associated Diagnoses: Chronic pain syndrome      pantoprazole (PROTONIX) 40 MG tablet TAKE 1 TABLET BY MOUTH EVERY MORNING BEFORE BREAKFAST  Qty: 30 tablet, Refills: 10    Associated Diagnoses: Gastroesophageal reflux disease without esophagitis      docusate sodium (DOK) 100 MG capsule Take 1 capsule by mouth daily  Qty: 30 capsule, Refills: 5      LINZESS 145 MCG capsule TAKE 1 CAPSULE BY MOUTH IN THE MORNING BEFORE BREAKFAST  Qty: 30 capsule, Refills: 10    Associated Diagnoses: Chronic idiopathic constipation      DULoxetine (CYMBALTA) 30 MG extended release capsule TAKE 1 CAPSULE BY MOUTH EVERY DAY  Qty: 30 capsule, Refills: 10    Associated Diagnoses: Depression, unspecified depression type      mineral oil liquid Take 30 mLs by mouth daily as needed for Constipation  Qty: 300 mL, Refills: 0    Associated Diagnoses: Constipation, unspecified constipation type      sennosides-docusate sodium (SENOKOT-S) 8.6-50 MG tablet Take 1 tablet by mouth daily  Qty: 10 tablet, Refills: 0    Associated Diagnoses: Constipation, unspecified constipation type      metoprolol succinate (TOPROL XL) 50 MG extended release tablet Take 1 tablet by mouth in the morning and at bedtime  Qty: 60 tablet, Refills: 1      apixaban (ELIQUIS) 5 MG TABS tablet Take 1 tablet by mouth 2 times daily  Qty: 60 tablet, Refills: 1 pravastatin (PRAVACHOL) 40 MG tablet Take 1 tablet by mouth daily  Qty: 90 tablet, Refills: 3      Continuous Blood Gluc Sensor (DEXCOM G6 SENSOR) MISC Every 10 days  Qty: 9 each, Refills: 3    Associated Diagnoses: DM (diabetes mellitus), secondary, uncontrolled, w/neurologic complic (HCC)      Continuous Blood Gluc Transmit (DEXCOM G6 TRANSMITTER) MISC 1 each by Does not apply route every 3 months  Qty: 1 each, Refills: 3    Associated Diagnoses: DM (diabetes mellitus), secondary, uncontrolled, w/neurologic complic (HCC)      Continuous Blood Gluc  (DEXCOM G6 ) ADAM 1 each by Does not apply route Daily with lunch  Qty: 1 each, Refills: 0    Associated Diagnoses: DM (diabetes mellitus), secondary, uncontrolled, w/neurologic complic (HCC)      B Complex-C-Folic Acid (VIRT-CAPS) 1 MG CAPS TAKE 1 CAPSULE BY MOUTH EVERY DAY  Qty: 90 capsule, Refills: 1      Calcium Acetate, Phos Binder, 667 MG CAPS TAKE 1 CAPSULE BY MOUTH THREE TIMES DAILY WITH MEALS  Qty: 90 capsule, Refills: 3      nitroGLYCERIN (NITROSTAT) 0.4 MG SL tablet DISSOLVE 1 TABLET UNDER THE TONGUE AS NEEDED FOR CHEST PAIN EVERY 5 MINUTES UP TO 3 TIMES. IF NO RELIEF CALL 911. Qty: 25 tablet, Refills: 10    Associated Diagnoses: Coronary artery disease involving native heart without angina pectoris, unspecified vessel or lesion type      vitamin D (ERGOCALCIFEROL) 56996 units CAPS capsule TK 1 C PO WEEKLY  Refills: 11      Tiotropium Bromide-Olodaterol (STIOLTO RESPIMAT) 2.5-2.5 MCG/ACT AERS Inhale 2 puffs into the lungs daily  Qty: 2 Inhaler, Refills: 0    Comments: 2 samples given:  Lot #[id number], Exp 7/21 & Lot #[id number], Exp 7/21      Blood Glucose Monitoring Suppl ADAM USE AS DIRECTED. Qty: 1 Device, Refills: 0    Comments: WITH CONTROL SOLUTION.       Alcohol Swabs PADS USE AS DIRECTED  Qty: 300 each, Refills: 3      albuterol sulfate  (90 Base) MCG/ACT inhaler Inhale 2 puffs into the lungs every 6 hours as needed for Wheezing  Qty: 1 Inhaler, Refills: 3      ipratropium-albuterol (DUONEB) 0.5-2.5 (3) MG/3ML SOLN nebulizer solution Inhale 3 mLs into the lungs every 6 hours as needed for Shortness of Breath  Qty: 360 mL, Refills: 1      calcium carbonate (TUMS) 500 MG chewable tablet Take 1 tablet by mouth 3 times daily as needed for Heartburn. ALLERGIES     Morphine    FAMILY HISTORY       Family History   Problem Relation Age of Onset    Diabetes Mother     Heart Disease Father     Kidney Disease Sister         stage 4-kidney failure    Cancer Sister     Heart Disease Sister     Obesity Sister     Cancer Sister     Heart Disease Sister     Obesity Sister     Alcohol Abuse Brother           SOCIAL HISTORY       Social History     Socioeconomic History    Marital status:      Spouse name: None    Number of children: None    Years of education: None    Highest education level: None   Tobacco Use    Smoking status: Former     Packs/day: 0.50     Years: 33.00     Pack years: 16.50     Types: Cigarettes     Quit date: 2020     Years since quittin.2    Smokeless tobacco: Never    Tobacco comments:     [facility] quit 2021   Vaping Use    Vaping Use: Never used   Substance and Sexual Activity    Alcohol use: Not Currently     Alcohol/week: 0.0 standard drinks     Comment: occ    Drug use: No    Sexual activity: Yes     Partners: Female     Comment:        SCREENINGS    Robert Coma Scale  Eye Opening: Spontaneous  Best Verbal Response: Oriented  Best Motor Response: Obeys commands  [place] Coma Scale Score: 15          PHYSICAL EXAM    (up to 7 for level 4, 8 or more for level 5)     ED Triage Vitals [22 0153]   BP Temp Temp Source Heart Rate Resp SpO2 Height Weight   (!) 203/110 98.3 °F (36.8 °C) Oral (!) 109 22 100 % 5' 9\" (1.753 m) 232 lb 13 oz (105.6 kg)       Physical Exam    General appearance:  Cooperative. No acute distress. Skin:  Warm. Dry. Eye:  Extraocular movements intact. Notable for the following components:    Protime 16.7 (*)     INR 1.37 (*)     All other components within normal limits   TROPONIN - Abnormal; Notable for the following components:    Troponin 0.12 (*)     All other components within normal limits   TROPONIN - Abnormal; Notable for the following components:    Troponin 0.10 (*)     All other components within normal limits    Narrative:     Collection has been rescheduled by Malaika Oconnor at 07/17/2022 08:24 Reason:   Patient eating   POCT GLUCOSE - Abnormal; Notable for the following components:    POC Glucose 320 (*)     All other components within normal limits   POCT GLUCOSE - Abnormal; Notable for the following components:    POC Glucose 212 (*)     All other components within normal limits   POCT GLUCOSE - Abnormal; Notable for the following components:    POC Glucose 355 (*)     All other components within normal limits   POCT GLUCOSE - Abnormal; Notable for the following components:    POC Glucose 320 (*)     All other components within normal limits   APTT   MAGNESIUM   MAGNESIUM   IRON AND TIBC   LIPID PANEL   URINE DRUG SCREEN   URINALYSIS WITH MICROSCOPIC   POCT GLUCOSE   POCT GLUCOSE   POCT GLUCOSE   POCT GLUCOSE   POCT GLUCOSE   POCT GLUCOSE   POCT GLUCOSE       Interpretation per the Radiologist below, if obtained/available at the time of this note:    XR CHEST PORTABLE   Final Result   Left basilar opacification and pleural effusion are redemonstrated. Pulmonary vascular congestion with interstitial and perihilar opacities   suggesting edema has mildly increased. All other labs/imaging were within normal range or not returned as of this dictation.     EMERGENCY DEPARTMENT COURSE and DIFFERENTIAL DIAGNOSIS/MDM:   Vitals:    Vitals:    07/17/22 1954 07/18/22 0014 07/18/22 0053 07/18/22 0414   BP: (!) 161/70  (!) 144/72    Pulse: 87  73    Resp: 20 17 19 16   Temp: 97.7 °F (36.5 °C)      TempSrc: Oral      SpO2: 98%  97%    Weight: Height:           Patient presents emergency department today complaining of shortness of breath. Quite hypertensive here. Increased work of breathing. Found to have elevated troponin elevated BNP which is unchanged from baseline. Slight worsening of his pulmonary edema on chest x-ray. Overall likely needs dialysis. Nephrology consulted and will arrange dialysis this morning and otherwise will be admitted to the hospital.  Low suspicion for PE or pneumonia at this time. Patient's blood pressure was treated with labetalol here    MDM      CONSULTS     IP CONSULT TO HOSPITALIST  IP CONSULT TO NEPHROLOGY  IP CONSULT TO HEART FAILURE NURSE/COORDINATOR  IP CONSULT TO DIETITIAN  IP CONSULT TO PULMONOLOGY  IP CONSULT TO NEPHROLOGY  IP CONSULT TO CARDIOLOGY    Critical Care:     CRITICAL CARE TIME   Total Critical Care time was 30 minutes, excluding separately reportable procedures. There was a high probability of clinically significant/life threatening deterioration in the patient's condition which required my urgent intervention. This time was spent reviewing the patient chart, interpreting diagnostic/laboratory data, speaking with consulting physicians, arranging dialysis for fluid overload and hypertensive emergency      REASSESSMENT          PROCEDURE     Unless otherwise noted below, none     Procedures      FINAL IMPRESSION      1. Acute on chronic congestive heart failure, unspecified heart failure type ([address]    2. Hypertensive emergency            DISPOSITION/PLAN   DISPOSITION Admitted 07/17/2022 04:25:49 AM        PATIENT REFERRED TO:  No follow-up provider specified. DISCHARGE MEDICATIONS:  Current Discharge Medication List        Controlled Substances Monitoring:     RX Monitoring 8/4/2017   Attestation The Prescription Monitoring Report for this patient was reviewed today. Periodic Controlled Substance Monitoring No signs of potential drug abuse or diversion identified.        (Please note that portions of this note were completed with a voice recognition program.  Efforts were made to edit the dictations but occasionally words are mis-transcribed.)    Otis Marsh MD (electronically signed)  Attending Emergency Physician            Jose Angel Alexander MD  07/18/22 0579

## 2022-07-17 NOTE — CONSULTS
[email or web address]  Nephrology Consult Note           Reason for Consult: ESRD  Requesting Physician:  Dr. Caryle Saras    Chief Complaint:    Chief Complaint   Patient presents with    Shortness of Breath     Seen multiple time for this, \"wants the fluids off is lungs\"  states \"I'm not going home you have to do something\"        History of Present Illness on 7/17/2022:    45 y.o. yo male with PMH of ESRD on HD, aortic stenosis, CHF reduced EF, atrial fibrillation, diabetes, emphysema, depression, sleep apnea, hypertension, hyperlipidemia, CVA who is admitted for respiratory failure  Patient presented to the emergency room with complaints of shortness of breath and required BiPAP. Urgent dialysis was summoned for pulmonary edema and patient got 4 L of net ultrafiltration. Preweight was 111.5 kilograms, post weight 107 kg  Earlier this month, patient was discharged with a target weight of 106 kg.   Is dialysis as outpatient on 7/13 showed a post weight  109 kg    Past Medical History:        Diagnosis Date    Ambulatory dysfunction     walker for long distances, SOB with distance    Aortic stenosis     echo 2017    Arthritis     hands and hips    Asthma     Bilateral hilar adenopathy syndrome 6/3/2013    CAD (coronary artery disease)     Dr. Cullen Spangler [facility]) 04/19/2019    EF= 43%    CHF (congestive heart failure) ([address]     Chronic pain     COPD (chronic obstructive pulmonary disease) ([address]     pulmonology Dr. Rip Ku    Depression     Diabetes mellitus ([address]     borderline    Difficult intravenous access     Emphysema of lung ([address]     ESRD (end stage renal disease) on dialysis ([address]     MWF    Fear of needles     Gastric ulcer     GERD (gastroesophageal reflux disease)     Heart valve problem     bicuspic valve    Hemodialysis patient ([address]     History of spinal fracture     work incident    Hx of blood clots     Bilateral lower extremities; stents in place    Hyperlipidemia Hypertension     MI (myocardial infarction) ([address] 2019    has had 9 MIs. 2019 was the last    Neuromuscular disorder ([address]     due to CVA    Numbness and tingling in left arm     from fistula    Pneumonia     PONV (postoperative nausea and vomiting)     Prolonged emergence from general anesthesia     States requires more medication than most people    Sleep apnea     Uses CPAP    Stroke ([address]     7mm thalamic cva 2017 deficts left side, left side weakness    TIA (transient ischemic attack)     Unspecified diseases of blood and blood-forming organs        Past Surgical History:        Procedure Laterality Date    AORTIC VALVE REPLACEMENT N/A 10/15/2019    TRANSCATHETER AORTIC VALVE REPLACEMENT FEMORAL APPROACH performed by Alexandria Carrillo MD at [facility] Right 7/2/2019    PERITONEAL DIALYSIS CATHETER REMOVAL performed by Reagan Macias MD at [address] [place] [address]  2/29/2015    [facility]    CORONARY ANGIOPLASTY WITH STENT PLACEMENT  05/26/15    CYST REMOVAL  08/14/2013    EXCISION CYSTS, NECK X2 AND ABDOMINAL benign    DIAGNOSTIC CARDIAC CATH LAB PROCEDURE      DIALYSIS FISTULA CREATION Left 10/30/2017    LEFT BRACHIAL CEPHALIC FISTULA    DIALYSIS FISTULA CREATION Left 3/27/2019    LIGATION  AV FISTULA performed by Michael Flaherty MD at [id number] [place] [address], DIAGNOSTIC      IR TUNNELED CATHETER PLACEMENT GREATER THAN 5 YEARS  3/21/2022    IR TUNNELED CATHETER PLACEMENT GREATER THAN 5 YEARS 3/21/2022 [facility]    IR TUNNELED CATHETER PLACEMENT GREATER THAN 5 YEARS  4/21/2022    IR TUNNELED CATHETER PLACEMENT GREATER THAN 5 YEARS 4/21/2022 [facility]    IR TUNNELED CATHETER PLACEMENT GREATER THAN 5 YEARS  4/26/2022    IR TUNNELED CATHETER PLACEMENT GREATER THAN 5 YEARS 4/26/2022 [facility]    IR TUNNELED CATHETER PLACEMENT GREATER THAN 5 YEARS  6/23/2022    IR TUNNELED CATHETER PLACEMENT GREATER THAN 5 YEARS 6/23/2022 [facility]    OTHER SURGICAL HISTORY  02/01/2017    laparoscopic cholecystectomy with intraoperative cholangiogram    OTHER SURGICAL HISTORY  2018    PORT PLACEMENT  - vas cath    OTHER SURGICAL HISTORY Bilateral 06/26/2018    laprascopic peritoneal dialysis catheter placement    OTHER SURGICAL HISTORY Right 09/2018    peritoneal dialysis port placed on right side of abdomen    OTHER SURGICAL HISTORY  05/28/2019    PTA/Stenting R External Iliac artery    [place] LAP INSERTION TUNNELED INTRAPERITONEAL CATHETER N/A 9/21/2018    LAPAROSCOPIC PERITONEAL DIALYSIS CATHETER REPLACEMENT performed by Alejandro Rios MD at [address] [place] [address] 2/24/2022    PERINEAL ABCESS DRAINAGE performed by Alejandro Rios MD at [address]  01/06/2016    UPPER GASTROINTESTINAL ENDOSCOPY  01/29/2017    possible candida, otherwise normal appearing    VASCULAR SURGERY  aprx 2 years ago    2 stents placed, each side of groin       Home Medications:    No current facility-administered medications on file prior to encounter. Current Outpatient Medications on File Prior to Encounter   Medication Sig Dispense Refill    traZODone (DESYREL) 100 MG tablet Take 100 mg by mouth nightly      oxyCODONE-acetaminophen (PERCOCET) 7.5-325 MG per tablet Take 1 tablet by mouth every 6 hours as needed (pain) for up to 30 days.  120 tablet 0    QUEtiapine (SEROQUEL) 50 MG tablet TAKE 1 TABLET BY MOUTH EVERY EVENING 30 tablet 10    isosorbide dinitrate (ISORDIL) 20 MG tablet Take 1 tablet by mouth 3 times daily 90 tablet 3    clopidogrel (PLAVIX) 75 MG tablet Take 1 tablet by mouth daily 90 tablet 3    cyclobenzaprine (FLEXERIL) 10 MG tablet TAKE 1 TABLET BY MOUTH EVERY 8 HOURS AS NEEDED 90 tablet 10    pantoprazole (PROTONIX) 40 MG tablet TAKE 1 TABLET BY MOUTH EVERY MORNING BEFORE BREAKFAST 30 tablet 10    docusate sodium (DOK) 100 MG capsule Take 1 capsule by mouth daily 30 capsule 5    LINZESS 145 MCG capsule TAKE 1 CAPSULE BY MOUTH IN THE MORNING BEFORE BREAKFAST 30 capsule 10    DULoxetine (CYMBALTA) 30 MG extended release capsule TAKE 1 CAPSULE BY MOUTH EVERY DAY 30 capsule 10    mineral oil liquid Take 30 mLs by mouth daily as needed for Constipation 300 mL 0    sennosides-docusate sodium (SENOKOT-S) 8.6-50 MG tablet Take 1 tablet by mouth daily 10 tablet 0    metoprolol succinate (TOPROL XL) 50 MG extended release tablet Take 1 tablet by mouth in the morning and at bedtime 60 tablet 1    apixaban (ELIQUIS) 5 MG TABS tablet Take 1 tablet by mouth 2 times daily 60 tablet 1    pravastatin (PRAVACHOL) 40 MG tablet Take 1 tablet by mouth daily 90 tablet 3    Continuous Blood Gluc Sensor (DEXCOM G6 SENSOR) MISC Every 10 days 9 each 3    Continuous Blood Gluc Transmit (DEXCOM G6 TRANSMITTER) MISC 1 each by Does not apply route every 3 months 1 each 3    Continuous Blood Gluc  (DEXCOM G6 ) ADAM 1 each by Does not apply route Daily with lunch 1 each 0    B Complex-C-Folic Acid (VIRT-CAPS) 1 MG CAPS TAKE 1 CAPSULE BY MOUTH EVERY DAY 90 capsule 1    Calcium Acetate, Phos Binder, 667 MG CAPS TAKE 1 CAPSULE BY MOUTH THREE TIMES DAILY WITH MEALS 90 capsule 3    nitroGLYCERIN (NITROSTAT) 0.4 MG SL tablet DISSOLVE 1 TABLET UNDER THE TONGUE AS NEEDED FOR CHEST PAIN EVERY 5 MINUTES UP TO 3 TIMES. IF NO RELIEF CALL 911. 25 tablet 10    vitamin D (ERGOCALCIFEROL) 42790 units CAPS capsule TK 1 C PO WEEKLY  11    Tiotropium Bromide-Olodaterol (STIOLTO RESPIMAT) 2.5-2.5 MCG/ACT AERS Inhale 2 puffs into the lungs daily 2 Inhaler 0    Blood Glucose Monitoring Suppl ADAM USE AS DIRECTED.  1 Device 0    Alcohol Swabs PADS USE AS DIRECTED 300 each 3    albuterol sulfate  (90 Base) MCG/ACT inhaler Inhale 2 puffs into the lungs every 6 hours as needed for Wheezing 1 Inhaler 3    ipratropium-albuterol (DUONEB) 0.5-2.5 (3) MG/3ML SOLN nebulizer solution Inhale 3 mLs into the lungs every 6 hours as needed for Shortness of Breath 360 mL 1    calcium carbonate (TUMS) 500 MG chewable tablet Take 1 tablet by mouth 3 times daily as needed for Heartburn. Allergies:  Morphine    Social History:    Social History     Socioeconomic History    Marital status:      Spouse name: Not on file    Number of children: Not on file    Years of education: Not on file    Highest education level: Not on file   Occupational History    Not on file   Tobacco Use    Smoking status: Former     Packs/day: 0.50     Years: 33.00     Pack years: 16.50     Types: Cigarettes     Quit date: 2020     Years since quittin.2    Smokeless tobacco: Never    Tobacco comments:     [facility] quit 2021   Vaping Use    Vaping Use: Never used   Substance and Sexual Activity    Alcohol use: Not Currently     Alcohol/week: 0.0 standard drinks     Comment: occ    Drug use: No    Sexual activity: Yes     Partners: Female     Comment:    Other Topics Concern    Not on file   Social History Narrative    Not on file     Social Determinants of Health     Financial Resource Strain: Not on file   Food Insecurity: Not on file   Transportation Needs: Not on file   Physical Activity: Not on file   Stress: Not on file   Social Connections: Not on file   Intimate Partner Violence: Not on file   Housing Stability: Not on file       Family History:   Family History   Problem Relation Age of Onset    Diabetes Mother     Heart Disease Father     Kidney Disease Sister         stage 4-kidney failure    Cancer Sister     Heart Disease Sister     Obesity Sister     Cancer Sister     Heart Disease Sister     Obesity Sister     Alcohol Abuse Brother        Review of Systems:   Pertinent positives stated above in HPI. All other 10 systems were reviewed and were negative.      Physical exam:   Constitutional:  VITALS:  /75   Pulse 82   Temp 97.9 °F (36.6 °C)   Resp 16   Ht 5' 9\" (1.753 m)   Wt 235 lb 14.3 oz (107 kg)   SpO2 98%   BMI 34.84 kg/m²   Gen: alert, awake  Neck: No JVD  Skin: Unremarkable  Cardiovascular:  S1, S2 without m/r/g   Respiratory: Diminished  Abdomen:  soft, nt, nd,   Extremities: trace lower extremity edema  Neuro/Psy: AAoriented times 3 ; moves all 4 ext    Data/  Recent Labs     07/16/22 0045 07/17/22  0208   WBC 9.6 11.9*   HGB 10.5* 10.6*   HCT 31.6* 32.5*   MCV 90.1 90.5    246     Recent Labs     07/16/22 0045 07/17/22  0208   * 131*   K 3.9 4.3   CL 90* 90*   CO2 28 25   GLUCOSE 204* 152*   BUN 34* 48*   CREATININE 5.0* 6.5*   LABGLOM 12* 9*   GFRAA 15* 11*       Assessment  -ESRD on HD Monday Wednesday Friday at [facility]   Target weight on discharge on 7/9 from hospital was 106 kg   Urgent dialysis on 7/17 with 4 L UF, post weight 107 kg  -Acute on chronic respiratory failure on BiPAP and now weaned off to nasal cannula  -Hyponatremia from volume overload  -Anemia  -CKD/MBD   Patient on Hectorol 8 mcg as outpatient  -Hypertension, poorly controlled    -History of CHF with reduced EF, EF around 20 to 25%  -Bicuspid severe aortic stenosis status post TAVR on 10/2019 along with CAD status post RCA stenting in 1/14/2022 and PATRICIA LAD on 2015    Plan  -Urgent HD on 7/17 performed  -HD again on 7/18 with 3 to 4 L UF   New target weight will be posted from 7/18  -Renal dose medications  -Continue EPO and Hectorol    Thank you for the consultation. Please do not hesitate to call with questions. Sandra Harrison MD  Office: [phone]  Fax:    [phone] [facility]

## 2022-07-17 NOTE — DIALYSIS
Treatment time: 3 hours  Net UF: 4000 ml    Pre weight: 111.5 kg   Post weight: 107 kg  EDW: TBD kg ( OutPt HD clinic DW: 109.5 kg, Challenged DW per MD order due to pulmonary edema)    Access used: Left chest wall TDC  Access function: Good with  ml/min    Medications or blood products given: no    Regular outpatient schedule: [facility] [address] [place] [place]    Summary of response to treatment: Pt's SOB, hypertensive prior HD, BP trend down to 120s during HD, Pt feels better after HD, HD completed in full, heparin dwell in TDC, capped and clamped. Cirt Line: Initial Hct: 30.7;   End Profile : A; Refill ( Hct.1 -35.6  subtract Hct 2- 35.1): 0.5 ( yes Refill); BV: -12.6 %      Copy of dialysis treatment record placed in chart, to be scanned into EMR.

## 2022-07-17 NOTE — PLAN OF CARE
HOSPITAL MEDICINE  - BRIEF NOTE    Seen by my colleague overnight. History/record reviewed, patient seen and examined.  -Patient seen with RN this morning. Reviewed care plan. RN reports nephrology planning for an extra session of hemodialysis today. Anticipate improvement of his shortness of breath from volume overload after hemodialysis session . Follow  rest of current care plan.     Katiuska Fernandez MD  Hospitalist Physician

## 2022-07-17 NOTE — H&P
HCO3 26. Patient was given labetalol and Lasix in the emergency department. Nephrology on-call was consulted by ED provider who notes patient should be admitted, anticipate dialysis treatment for same the morning, and treat blood pressure with labetalol and hydralazine. Hospital team to admit with nephrology to follow.     Past Medical History:          Diagnosis Date    Ambulatory dysfunction     walker for long distances, SOB with distance    Aortic stenosis     echo 2017    Arthritis     hands and hips    Asthma     Bilateral hilar adenopathy syndrome 6/3/2013    CAD (coronary artery disease)     Dr. Rod Olvera [facility]) 04/19/2019    EF= 43%    CHF (congestive heart failure) ([address]     Chronic pain     COPD (chronic obstructive pulmonary disease) ([address]     pulmonology Dr. Estela Duran     Diabetes mellitus ([address]     borderline    Difficult intravenous access     Emphysema of lung ([address]     ESRD (end stage renal disease) on dialysis ([address]     MWF    Fear of needles     Gastric ulcer     GERD (gastroesophageal reflux disease)     Heart valve problem     bicuspic valve    Hemodialysis patient ([address]     History of spinal fracture     work incident    Hx of blood clots     Bilateral lower extremities; stents in place    Hyperlipidemia     Hypertension     MI (myocardial infarction) ([address] 2019    has had 9 MIs. 2019 was the last    Neuromuscular disorder ([address]     due to CVA    Numbness and tingling in left arm     from fistula    Pneumonia     PONV (postoperative nausea and vomiting)     Prolonged emergence from general anesthesia     States requires more medication than most people    Sleep apnea     Uses CPAP    Stroke ([address]     7mm thalamic cva 2017 deficts left side, left side weakness    TIA (transient ischemic attack)     Unspecified diseases of blood and blood-forming organs      Past Surgical History:          Procedure Laterality Date    AORTIC VALVE REPLACEMENT N/A 10/15/2019    TRANSCATHETER AORTIC VALVE REPLACEMENT FEMORAL APPROACH performed by Mary Damon MD at [address] Right 7/2/2019    PERITONEAL DIALYSIS CATHETER REMOVAL performed by José Luis Valles MD at [address]  2/29/2015    [facility]    CORONARY ANGIOPLASTY WITH STENT PLACEMENT  05/26/15    CYST REMOVAL  08/14/2013    EXCISION CYSTS, NECK X2 AND ABDOMINAL benign    DIAGNOSTIC CARDIAC CATH LAB PROCEDURE      DIALYSIS FISTULA CREATION Left 10/30/2017    LEFT BRACHIAL CEPHALIC FISTULA    DIALYSIS FISTULA CREATION Left 3/27/2019    LIGATION  AV FISTULA performed by Linda Mohr MD at [address], COLON, DIAGNOSTIC      IR TUNNELED CATHETER PLACEMENT GREATER THAN 5 YEARS  3/21/2022    IR TUNNELED CATHETER PLACEMENT GREATER THAN 5 YEARS 3/21/2022 [facility]    IR TUNNELED CATHETER PLACEMENT GREATER THAN 5 YEARS  4/21/2022    IR TUNNELED CATHETER PLACEMENT GREATER THAN 5 YEARS 4/21/2022 [facility]    IR TUNNELED CATHETER PLACEMENT GREATER THAN 5 YEARS  4/26/2022    IR TUNNELED CATHETER PLACEMENT GREATER THAN 5 YEARS 4/26/2022 [facility]    IR TUNNELED CATHETER PLACEMENT GREATER THAN 5 YEARS  6/23/2022    IR TUNNELED CATHETER PLACEMENT GREATER THAN 5 YEARS 6/23/2022 [facility]    OTHER SURGICAL HISTORY  02/01/2017    laparoscopic cholecystectomy with intraoperative cholangiogram    OTHER SURGICAL HISTORY  2018    PORT PLACEMENT  - vas cath    OTHER SURGICAL HISTORY Bilateral 06/26/2018    laprascopic peritoneal dialysis catheter placement    OTHER SURGICAL HISTORY Right 09/2018    peritoneal dialysis port placed on right side of abdomen    OTHER SURGICAL HISTORY  05/28/2019    PTA/Stenting R External Iliac artery    [place] LAP INSERTION TUNNELED INTRAPERITONEAL CATHETER N/A 9/21/2018    LAPAROSCOPIC PERITONEAL DIALYSIS CATHETER REPLACEMENT performed by José Luis Valles MD at [address] SURGERY N/A 2/24/2022    PERINEAL ABCESS DRAINAGE performed by Ofe Huber MD at [address] [facility]  01/06/2016    UPPER GASTROINTESTINAL ENDOSCOPY  01/29/2017    possible candida, otherwise normal appearing    VASCULAR SURGERY  aprx 2 years ago    2 stents placed, each side of groin     Medications Prior to Admission:      Prior to Admission medications    Medication Sig Start Date End Date Taking? Authorizing Provider   traZODone (DESYREL) 100 MG tablet Take 100 mg by mouth nightly    Historical Provider, MD   oxyCODONE-acetaminophen (PERCOCET) 7.5-325 MG per tablet Take 1 tablet by mouth every 6 hours as needed (pain) for up to 30 days.  7/5/22 8/4/22  Katiuska Serrano MD   QUEtiapine (SEROQUEL) 50 MG tablet TAKE 1 TABLET BY MOUTH EVERY EVENING 6/30/22   Katiuska Serrano MD   isosorbide dinitrate (ISORDIL) 20 MG tablet Take 1 tablet by mouth 3 times daily 6/8/22   JASE Hawk   clopidogrel (PLAVIX) 75 MG tablet Take 1 tablet by mouth daily 5/9/22   [facility], APRN - CNP   cyclobenzaprine (FLEXERIL) 10 MG tablet TAKE 1 TABLET BY MOUTH EVERY 8 HOURS AS NEEDED 4/28/22   Katiuska Serrano MD   pantoprazole (PROTONIX) 40 MG tablet TAKE 1 TABLET BY MOUTH EVERY MORNING BEFORE BREAKFAST 4/28/22   Katiuska Serrano MD   docusate sodium (DOK) 100 MG capsule Take 1 capsule by mouth daily 4/27/22   Rosalinda Nicole MD   LINZESS 145 MCG capsule TAKE 1 CAPSULE BY MOUTH IN THE MORNING BEFORE BREAKFAST 4/27/22   Katiuska Serrano MD   DULoxetine (CYMBALTA) 30 MG extended release capsule TAKE 1 CAPSULE BY MOUTH EVERY DAY 3/29/22   Katiuska Serrano MD   mineral oil liquid Take 30 mLs by mouth daily as needed for Constipation 3/9/22   Katiuska Serrano MD   sennosides-docusate sodium (SENOKOT-S) 8.6-50 MG tablet Take 1 tablet by mouth daily 3/9/22   Katiuska Serrano MD   metoprolol succinate (TOPROL XL) 50 MG extended release tablet Take 1 tablet by mouth in the morning History:      The patient currently lives at home. TOBACCO:   reports that he quit smoking about 2 years ago. His smoking use included cigarettes. He has a 16.50 pack-year smoking history. He has never used smokeless tobacco.  ETOH:   reports that he does not currently use alcohol. E-cigarette/Vaping       Questions Responses    E-cigarette/Vaping Use Never User    Start Date     Passive Exposure     Quit Date     Counseling Given     Comments           Family History:      Reviewed in detail at bedside with patient; positive as follows:        Problem Relation Age of Onset    Diabetes Mother     Heart Disease Father     Kidney Disease Sister         stage 4-kidney failure    Cancer Sister     Heart Disease Sister     Obesity Sister     Cancer Sister     Heart Disease Sister     Obesity Sister     Alcohol Abuse Brother      REVIEW OF SYSTEMS COMPLETED:   Pertinent positives as noted in the HPI. All other systems reviewed and negative. PHYSICAL EXAM PERFORMED:    BP (!) 228/100   Pulse (!) 108   Temp 98.3 °F (36.8 °C) (Oral)   Resp 26   Ht 5' 9\" (1.753 m)   Wt 232 lb 13 oz (105.6 kg)   SpO2 99%   BMI 34.38 kg/m²     General appearance:  Sitting orthopneic on side of stretcher, BiPAP applied, appears stated age and cooperative. HEENT:  Normal cephalic, atraumatic without obvious deformity. Pupils equal, round, and reactive to light. Extra ocular muscles intact. Conjunctivae/corneas clear. Neck: Supple, with full range of motion. No jugular venous distention. Trachea midline. Respiratory:  Mild increase in WOB, . Diminished BS in all airfields with crackles noted to LLL b  Cardiovascular:  Regular rate and rhythm with normal S1/S2 without rubs or gallops, PMI displaced laterally and inferiorly, systolic murmur noted, 1+ pitting edema BLE  Abdomen: Soft, non-tender, non-distended with normal bowel sounds. Musculoskeletal:  No clubbing, cyanosis or edema bilaterally.   Full range of motion without deformity. Skin: Skin color, texture, turgor normal.  No rashes. Abrasions noted to RLE with abrasion to right great toe. Neurologic:  Neurovascularly intact without any focal sensory/motor deficits. Cranial nerves: II-XII intact, grossly non-focal.  Psychiatric:  Alert and oriented, thought content appropriate, normal insight  Capillary Refill: Brisk,3 seconds, normal  Peripheral Pulses: +2 palpable, equal bilaterally     Labs:     Recent Labs     07/16/22 0045 07/17/22  0208   WBC 9.6 11.9*   HGB 10.5* 10.6*   HCT 31.6* 32.5*    246     Recent Labs     07/16/22 0045 07/17/22  0208   * 131*   K 3.9 4.3   CL 90* 90*   CO2 28 25   BUN 34* 48*   CREATININE 5.0* 6.5*   CALCIUM 8.8 9.0     Recent Labs     07/16/22 0045 07/17/22  0208   AST 11* 16   ALT 8* 8*   BILITOT <0.2 0.3   ALKPHOS 94 95     Recent Labs     07/17/22  0310   INR 1.37*     Recent Labs     07/16/22  0045 07/17/22  0208   TROPONINI 0.10* 0.12*       Urinalysis:      Lab Results   Component Value Date/Time    NITRU Negative 05/29/2022 12:51 PM    WBCUA 10-20 05/29/2022 12:51 PM    BACTERIA 3+ 05/29/2022 12:51 PM    RBCUA 5-10 05/29/2022 12:51 PM    BLOODU TRACE-INTACT 05/29/2022 12:51 PM    SPECGRAV 1.015 05/29/2022 12:51 PM    GLUCOSEU 100 05/29/2022 12:51 PM     Radiology:     I have reviewed the radiographic results for this encounter with the following interpretation(s); see report(s) below:    XR CHEST PORTABLE   Final Result   Left basilar opacification and pleural effusion are redemonstrated. Pulmonary vascular congestion with interstitial and perihilar opacities   suggesting edema has mildly increased.              EKG:  I have reviewed the EKG with the following interpretation in the absence of a cardiologist: Non-specific EKG, no acute ST-segment/T-wave changes    Consults:    IP CONSULT TO HOSPITALIST  IP CONSULT TO NEPHROLOGY  IP CONSULT TO NEPHROLOGY  IP CONSULT TO CARDIOLOGY    ASSESSMENT:    Active Hospital Problems

## 2022-07-17 NOTE — ED NOTES
Pt stating multiple times to this RN, \"I won't leave here until the doctor fixes me. \"      Malathi Arteaga RN  07/17/22 7280

## 2022-07-17 NOTE — FLOWSHEET NOTE
Treatment time: 3 hours  Net UF: 4000 ml     Pre weight: 111.5 kg  Post weight: 107 kg  EDW: TBD kg ( OutPt HD clinic DW: 109.5 kg, Challenged DW per MD order due to pulmonary edema)     Access used: Left chest wall TDC  Access function: Good with  ml/min     Medications or blood products given: no     Regular outpatient schedule: [facility] [address] [place] [place]     Summary of response to treatment: Pt's SOB, hypertensive prior HD, BP trend down to 120s during HD, Pt feels better after HD, HD completed in full, heparin dwell in TDC, capped and clamped.      Cirt Line: Initial Hct: 30.7;  End Profile : A; Refill ( Hct.1 -35.6  subtract Hct 2- 35.1): 0.5 ( yes Refill); BV: -12.6 %        Copy of dialysis treatment record placed in chart, to be scanned into EMR.    07/17/22 0941 07/17/22 1250   Vital Signs   BP (!) 179/89 137/75   Temp 98.5 °F (36.9 °C) 97.9 °F (36.6 °C)   Heart Rate 83 82   Resp 24 16   SpO2 98 % 98 %   Weight 245 lb 13 oz (111.5 kg) 235 lb 14.3 oz (107 kg)   Weight Method Actual;Bed scale Bed scale   Percent Weight Change 2.86 -4.04

## 2022-07-17 NOTE — ED NOTES
PS to [facility] @ [id number]  RE:  consult per Dr. Salvador Bacon @ [address] [facility]  07/17/22 2483

## 2022-07-17 NOTE — CONSULTS
[address]   Cardiology Consultation   Date: 7/17/2022  Reason for Consultation: Hypertensive emergency, pleural effusion   Consult Requesting Physician: Katiuska Fernandez MD   Chief Complaint   Patient presents with    Shortness of Breath     Seen multiple time for this, \"wants the fluids off is lungs\"  states \"I'm not going home you have to do something\"          CC: Shortness of breath    HPI: Meryle Best is a 45 y.o. male with ESRD on HD, DM, HTN, CAD history of PCI, ischemic cardiomyopathy, PAF with HFrEF who has presented to the hospital with increasing SOB. Reports occasional mild chest discomfort. No palpitation. He was on Entresto but states that he was not taking it for a few days. He is on HD on M/W/F and last dialysis was on Friday. Today he is undergoing HD and feeling better. He has complex coronary artery disease and seen by interventional cardiology. Patient states that he is still making some urine. He doesn't know his baseline weight. He is not taking diuretics. Assessment:   Acute on chronic HFrEF  Non-compliance   Ischemic cardiomyopathy  CAD  ESRD on HD  Hypertensive emergency  Hyponatremia  Cardiac injury with elevated troponin  Morbid obesity: Body mass index is 36.3 kg/m². S/p TAVR 2019   PAF    Plan:   Volume overload and acute HFrEF    Patient is ESRD and on HD today for fluid removal.    Pro-BNP > 70,000  Weight daily   I/Os  Resume Entresto   Plavix  Hydralazine, Isordil   Toprol XL     On Eliquis for PAF. Reduce the dose to 2.5 bid since since he has ESRD and is on Plavix. Heart failure evaluation and follow up tomorrow. Pulmonary evaluation for pleural effusion. Discussed with nursing staff.      Active Hospital Problems    Diagnosis Date Noted    Type 2 diabetes, uncontrolled, with neuropathy ([address] [E11.40, E11.65] 03/04/2014     Priority: High    CHF (congestive heart failure) ([facility] [I50.9] 05/14/2013     Priority: High    Coronary artery disease of native artery of native heart with stable angina pectoris ([facility] [I25.118] 05/14/2013     Priority: High    Hypertensive emergency [I16.1] 07/17/2022     Priority: Medium    ESRD (end stage renal disease) on dialysis ([facility] [N18.6, Z99.2] 11/22/2017       Diagnostic studies:   ECG: sinus tachycardia, non-specific T wave changes    CXR:   Left basilar opacification and pleural effusion are redemonstrated. Pulmonary vascular congestion with interstitial and perihilar opacities   suggesting edema has mildly increased. Echo: 6/3/2022:    Definity® used for myocardial border enhancement. Left ventricular systolic function is severely reduced with a visually   estimated ejection fraction of 20-25 %. EF estimated by Jasmine's method at 24 %. The left ventricle is mildly dilated in size with normal wall thickness. Severe global hypokinesis with regional variation. Grade I diastolic dysfunction with normal LV pressure. There is no evidence of a left ventricular thrombus. Right ventricular systolic function is reduced. A 29 mm Langford Leyda S3 bioprosthetic aortic valve appears well seated   with normal Doppler values. Inadequate tricuspid valve regurgitation to estimate systolic pulmonary   artery pressure. There is a moderate left pleural effusion noted. Cath: 6/7/2022   LM Less than 10% agcgjcej-hmx-[id number] stenosis            LAD Moderately calcified, 20 to 30% proximal-mid stenosis, distal vessel tapers at the apex with 60% diffuse disease. D1 has an ostial/proximal 75 to 80% stenosis. LCX  Calcified, proximal-mid 75 to 80% stenosis. Distal vessel is tortuous with 70 to 85% diffuse stenosis with more discrete stenosis noted distally. OM 1 is a very small vessel with ostial/proximal 80% stenosis and 60 to 70% diffuse disease in the kzcjfqjr-qtb-enahyq vessel. Joya Townsend RCA Dominant, calcified, tortuous, there is a proximal-mid stent with 60 to 70% in-stent restenosis. Distal vessel 20 to 30% stenosis   Mild to moderately increased filling pressures  Patent RCA stent with moderate to borderline severe in-stent restenosis  Severe circumflex and D1 stenosis  Continue medical management, consider outpatient high risk PCI of above territories pending outpatient clinical follow-up. Currently medical management seems best given comorbidities and need for additional fluid removal.  If PCI is pursued, will likely need general anesthesia. Patient had difficulty lying still on Cath Lab table. Okay to resume Eliquis tomorrow, case/plan/findings discussed with patient and wife, they understand/agree, they stated it is incorrectly stated in chart the patient would refuse blood, he is okay to receive blood products if needed. Continue beta-blocker and Entresto       I independently reviewed the cardiac diagnostic studies, ECG and relevant imaging studies. Lab Results   Component Value Date    LVEF 20 2022    LVEFMODE Echo 10/16/2019     Lab Results   Component Value Date    TSH 2.15 2022       Physical Examination:  Vitals:    22 0941   BP: (!) 179/89   Pulse: 83   Resp: 24   Temp: 98.5 °F (36.9 °C)   SpO2: 98%      No intake/output data recorded. Wt Readings from Last 3 Encounters:   22 245 lb 13 oz (111.5 kg)   22 232 lb 12.9 oz (105.6 kg)   22 242 lb (109.8 kg)     Temp  Av.3 °F (36.8 °C)  Min: 97.8 °F (36.6 °C)  Max: 98.5 °F (36.9 °C)  Pulse  Av  Min: 81  Max: 109  BP  Min: 146/96  Max: 228/100  SpO2  Av.5 %  Min: 90 %  Max: 100 %  No intake or output data in the 24 hours ending 22 1126      I independently reviewed all cardiac tracing from cardiac telemetry. Constitutional: Oriented. No distress. Head: Normocephalic and atraumatic. Mouth/Throat: Oropharynx is clear and moist.   Eyes: Conjunctivae normal. EOM are normal.   Neck: Neck supple. No JVD present. Cardiovascular: Normal rate, regular rhythm, S1&S2. Pulmonary/Chest: Decreased respiratory sounds. L > R + rhonchi. Abdominal: Soft. No tenderness. + obese   Musculoskeletal: No tenderness. No edema    Lymphadenopathy: Has no cervical adenopathy. Neurological: Alert and oriented. Follows command, No Gross deficit   Skin: Skin is warm, No rash noted. Psychiatric: Has a normal behavior       Scheduled Meds:   apixaban  5 mg Oral BID    clopidogrel  75 mg Oral Daily    isosorbide dinitrate  20 mg Oral TID    metoprolol succinate  50 mg Oral BID    pantoprazole  40 mg Oral QAM AC    pravastatin  40 mg Oral Daily    tiotropium-olodaterol  2 puff Inhalation Daily    traZODone  100 mg Oral Nightly    sodium chloride flush  5-40 mL IntraVENous 2 times per day    insulin lispro  0-6 Units SubCUTAneous TID WC    insulin lispro  0-3 Units SubCUTAneous Nightly     Continuous Infusions:   sodium chloride      dextrose       PRN Meds:.albuterol sulfate HFA, cyclobenzaprine, ipratropium-albuterol, oxyCODONE-acetaminophen, sodium chloride flush, sodium chloride, acetaminophen **OR** acetaminophen, prochlorperazine, hydrALAZINE, glucose, dextrose bolus **OR** dextrose bolus, glucagon (rDNA), dextrose, labetalol, heparin (porcine)     Review of System:  [x] Full ROS obtained and negative except as mentioned in HPI    Prior to Admission medications    Medication Sig Start Date End Date Taking? Authorizing Provider   traZODone (DESYREL) 100 MG tablet Take 100 mg by mouth nightly    Historical Provider, MD   oxyCODONE-acetaminophen (PERCOCET) 7.5-325 MG per tablet Take 1 tablet by mouth every 6 hours as needed (pain) for up to 30 days.  7/5/22 8/4/22  Latisha Casey MD   QUEtiapine (SEROQUEL) 50 MG tablet TAKE 1 TABLET BY MOUTH EVERY EVENING 6/30/22   Latisha Casey MD   isosorbide dinitrate (ISORDIL) 20 MG tablet Take 1 tablet by mouth 3 times daily 6/8/22   JASE Pavon   clopidogrel (PLAVIX) 75 MG tablet Take 1 tablet by mouth daily 5/9/22   JAY Benitez - CNP   cyclobenzaprine (FLEXERIL) 10 MG tablet TAKE 1 TABLET BY MOUTH EVERY 8 HOURS AS NEEDED 4/28/22   Lanette Perez MD   pantoprazole (PROTONIX) 40 MG tablet TAKE 1 TABLET BY MOUTH EVERY MORNING BEFORE BREAKFAST 4/28/22   Lanette Perez MD   docusate sodium (DOK) 100 MG capsule Take 1 capsule by mouth daily 4/27/22   Lazarus Sayers, MD   LINZESS 145 MCG capsule TAKE 1 CAPSULE BY MOUTH IN THE MORNING BEFORE BREAKFAST 4/27/22   Lanette Perez MD   DULoxetine (CYMBALTA) 30 MG extended release capsule TAKE 1 CAPSULE BY MOUTH EVERY DAY 3/29/22   Lanette Perez MD   mineral oil liquid Take 30 mLs by mouth daily as needed for Constipation 3/9/22   Lanette Perez MD   sennosides-docusate sodium (SENOKOT-S) 8.6-50 MG tablet Take 1 tablet by mouth daily 3/9/22   Lanette Perez MD   metoprolol succinate (TOPROL XL) 50 MG extended release tablet Take 1 tablet by mouth in the morning and at bedtime 3/3/22   Heather Wilson MD   apixaban (ELIQUIS) 5 MG TABS tablet Take 1 tablet by mouth 2 times daily 3/3/22   Heather Wilson MD   pravastatin (PRAVACHOL) 40 MG tablet Take 1 tablet by mouth daily 2/10/22   Merle Lips, APRN - CNP   Continuous Blood Gluc Sensor (DEXCOM G6 SENSOR) MISC Every 10 days 10/5/21   Lanette Perez MD   Continuous Blood Gluc Transmit (DEXCOM G6 TRANSMITTER) MISC 1 each by Does not apply route every 3 months 10/5/21   Lantete Perez MD   Continuous Blood Gluc  (Slava Shark ) [address] 1 each by Does not apply route Daily with lunch 10/5/21   Lanette Perez MD   B Complex-C-Folic Acid (VIRT-CAPS) 1 MG CAPS TAKE 1 CAPSULE BY MOUTH EVERY DAY 9/20/21   Lanette Perez MD   Calcium Acetate, Phos Binder, 667 MG CAPS TAKE 1 CAPSULE BY MOUTH THREE TIMES DAILY WITH MEALS 8/12/21   Lanette Perez MD   nitroGLYCERIN (NITROSTAT) 0.4 MG SL tablet DISSOLVE 1 TABLET UNDER THE TONGUE AS NEEDED FOR CHEST PAIN EVERY 5 MINUTES UP TO 3 TIMES.  IF NO RELIEF CALL 911. 1/7/21   Lanette Perez MD   vitamin D (ERGOCALCIFEROL) 44435 units CAPS capsule TK 1 C PO WEEKLY 6/2/19   Historical Provider, MD   Tiotropium Bromide-Olodaterol (STIOLTO RESPIMAT) 2.5-2.5 MCG/ACT AERS Inhale 2 puffs into the lungs daily 5/21/19   Lauri Egan MD   Blood Glucose Monitoring Suppl ADAM USE AS DIRECTED. 4/25/18   Jazmyn Bassett MD   Alcohol Swabs PADS USE AS DIRECTED 4/25/18   Jazmyn Bassett MD   albuterol sulfate  (90 Base) MCG/ACT inhaler Inhale 2 puffs into the lungs every 6 hours as needed for Wheezing 11/8/17   Elly Diaz MD   ipratropium-albuterol (DUONEB) 0.5-2.5 (3) MG/3ML SOLN nebulizer solution Inhale 3 mLs into the lungs every 6 hours as needed for Shortness of Breath 10/15/17   Roland Upton MD   calcium carbonate (TUMS) 500 MG chewable tablet Take 1 tablet by mouth 3 times daily as needed for Heartburn.     Historical Provider, MD       Past Medical History:   Diagnosis Date    Ambulatory dysfunction     walker for long distances, SOB with distance    Aortic stenosis     echo 2017    Arthritis     hands and hips    Asthma     Bilateral hilar adenopathy syndrome 6/3/2013    CAD (coronary artery disease)     Dr. Christiano Chris [facility]) 04/19/2019    EF= 43%    CHF (congestive heart failure) ([address]     Chronic pain     COPD (chronic obstructive pulmonary disease) ([address]     pulmonology Dr. Elkin Christensen    Depression     Diabetes mellitus ([address]     borderline    Difficult intravenous access     Emphysema of lung ([address]     ESRD (end stage renal disease) on dialysis ([address]     MWF    Fear of needles     Gastric ulcer     GERD (gastroesophageal reflux disease)     Heart valve problem     bicuspic valve    Hemodialysis patient ([address]     History of spinal fracture     work incident    Hx of blood clots     Bilateral lower extremities; stents in place    Hyperlipidemia     Hypertension     MI (myocardial infarction) ([address] 2019    has had 9 MIs. 2019 was the last    Neuromuscular disorder ([address] due to CVA    Numbness and tingling in left arm     from fistula    Pneumonia     PONV (postoperative nausea and vomiting)     Prolonged emergence from general anesthesia     States requires more medication than most people    Sleep apnea     Uses CPAP    Stroke ([address]     7mm thalamic cva 2017 deficts left side, left side weakness    TIA (transient ischemic attack)     Unspecified diseases of blood and blood-forming organs         Past Surgical History:   Procedure Laterality Date    AORTIC VALVE REPLACEMENT N/A 10/15/2019    TRANSCATHETER AORTIC VALVE REPLACEMENT FEMORAL APPROACH performed by Nathaly Mckeon MD at [address] [facility] Right 7/2/2019    PERITONEAL DIALYSIS CATHETER REMOVAL performed by Vu Parry MD at [address]  2/29/2015    WNL    CORONARY ANGIOPLASTY WITH STENT PLACEMENT  05/26/15    CYST REMOVAL  08/14/2013    EXCISION CYSTS, NECK X2 AND ABDOMINAL benign    DIAGNOSTIC CARDIAC CATH LAB PROCEDURE      DIALYSIS FISTULA CREATION Left 10/30/2017    LEFT BRACHIAL CEPHALIC FISTULA    DIALYSIS FISTULA CREATION Left 3/27/2019    LIGATION  AV FISTULA performed by Doron Heath MD at [address] [facility], COLON, DIAGNOSTIC      IR TUNNELED CATHETER PLACEMENT GREATER THAN 5 YEARS  3/21/2022    IR TUNNELED CATHETER PLACEMENT GREATER THAN 5 YEARS 3/21/2022 [facility]    IR TUNNELED CATHETER PLACEMENT GREATER THAN 5 YEARS  4/21/2022    IR TUNNELED CATHETER PLACEMENT GREATER THAN 5 YEARS 4/21/2022 [facility]    IR TUNNELED CATHETER PLACEMENT GREATER THAN 5 YEARS  4/26/2022    IR TUNNELED CATHETER PLACEMENT GREATER THAN 5 YEARS 4/26/2022 [facility]    IR TUNNELED CATHETER PLACEMENT GREATER THAN 5 YEARS  6/23/2022    IR TUNNELED CATHETER PLACEMENT GREATER THAN 5 YEARS 6/23/2022 [facility]    OTHER SURGICAL HISTORY  02/01/2017    laparoscopic cholecystectomy with intraoperative cholangiogram    OTHER SURGICAL HISTORY  2018    PORT PLACEMENT  - vas cath    OTHER SURGICAL HISTORY Bilateral 06/26/2018    laprascopic peritoneal dialysis catheter placement    OTHER SURGICAL HISTORY Right 09/2018    peritoneal dialysis port placed on right side of abdomen    OTHER SURGICAL HISTORY  05/28/2019    PTA/Stenting R External Iliac artery    [place] LAP INSERTION TUNNELED INTRAPERITONEAL CATHETER N/A 9/21/2018    LAPAROSCOPIC PERITONEAL DIALYSIS CATHETER REPLACEMENT performed by Fiorella Mckeon MD at [address] [facility] 2/24/2022    PERINEAL ABCESS DRAINAGE performed by Fiorella Mckeon MD at [facility].  01/06/2016    UPPER GASTROINTESTINAL ENDOSCOPY  01/29/2017    possible candida, otherwise normal appearing    VASCULAR SURGERY  aprx 2 years ago    2 stents placed, each side of groin       Allergies   Allergen Reactions    Morphine Nausea And Vomiting       Social History:  Reviewed. reports that he quit smoking about 2 years ago. His smoking use included cigarettes. He has a 16.50 pack-year smoking history. He has never used smokeless tobacco. He reports that he does not currently use alcohol. He reports that he does not use drugs. Family History:  Reviewed. Reviewed. No family history of SCD. Relevant and available labs, and cardiovascular diagnostics reviewed. Reviewed. Recent Labs     07/16/22  0045 07/17/22  0208   * 131*   K 3.9 4.3   CL 90* 90*   CO2 28 25   BUN 34* 48*   CREATININE 5.0* 6.5*     Recent Labs     07/16/22 0045 07/17/22  0208   WBC 9.6 11.9*   HGB 10.5* 10.6*   HCT 31.6* 32.5*   MCV 90.1 90.5    246     Estimated Creatinine Clearance: 16 mL/min (A) (based on SCr of 6.5 mg/dL [facility])). Lab Results   Component Value Date/Time    BNP 72 09/09/2013 02:14 AM     06/26/2013 12:32 PM    .0 05/16/2013 07:13 AM       I independently reviewed all cardiac tracing from cardiac telemetry.     I independently

## 2022-07-17 NOTE — CONSULTS
Dr. Getachew Adams seen pt on 7/17 for consult regarding bilateral pulmonary effusions Shelda Spurling

## 2022-07-17 NOTE — RT PROTOCOL NOTE
RT Inhaler-Nebulizer Bronchodilator Protocol Note    There is a bronchodilator order in the chart from a provider indicating to follow the RT Bronchodilator Protocol and there is an Initiate RT Inhaler-Nebulizer Bronchodilator Protocol order as well (see protocol at bottom of note). CXR Findings:  XR CHEST PORTABLE    Result Date: 7/17/2022  Left basilar opacification and pleural effusion are redemonstrated. Pulmonary vascular congestion with interstitial and perihilar opacities suggesting edema has mildly increased. XR CHEST PORTABLE    Result Date: 7/16/2022  Moderate left basilar opacities suggest airspace disease and left-sided effusion. Cardiomegaly with mild perihilar congestion. The findings from the last RT Protocol Assessment were as follows:   History Pulmonary Disease: Chronic pulmonary disease  Respiratory Pattern: Mild dyspnea at rest, irregular pattern, or RR 21-25 bpm only when people are watching  Breath Sounds: Slightly diminished and/or crackles  Cough: Strong, spontaneous, non-productive  Indication for Bronchodilator Therapy: On home bronchodilators  Bronchodilator Assessment Score: 8    Aerosolized bronchodilator medication orders have been revised according to the RT Inhaler-Nebulizer Bronchodilator Protocol below. Respiratory Therapist to perform RT Therapy Protocol Assessment initially then follow the protocol. Repeat RT Therapy Protocol Assessment PRN for score 0-3 or on second treatment, BID, and PRN for scores above 3. No Indications - adjust the frequency to every 6 hours PRN wheezing or bronchospasm, if no treatments needed after 48 hours then discontinue using Per Protocol order mode. If indication present, adjust the RT bronchodilator orders based on the Bronchodilator Assessment Score as indicated below.   Use Inhaler orders unless patient has one or more of the following: on home nebulizer, not able to hold breath for 10 seconds, is not alert and oriented, cannot activate and use MDI correctly, or respiratory rate 25 breaths per minute or more, then use the equivalent nebulizer order(s) with same Frequency and PRN reasons based on the score. If a patient is on this medication at home then do not decrease Frequency below that used at home. 0-3 - enter or revise RT bronchodilator order(s) to equivalent RT Bronchodilator order with Frequency of every 4 hours PRN for wheezing or increased work of breathing using Per Protocol order mode. 4-6 - enter or revise RT Bronchodilator order(s) to two equivalent RT bronchodilator orders with one order with BID Frequency and one order with Frequency of every 4 hours PRN wheezing or increased work of breathing using Per Protocol order mode. 7-10 - enter or revise RT Bronchodilator order(s) to two equivalent RT bronchodilator orders with one order with TID Frequency and one order with Frequency of every 4 hours PRN wheezing or increased work of breathing using Per Protocol order mode. 11-13 - enter or revise RT Bronchodilator order(s) to one equivalent RT bronchodilator order with QID Frequency and an Albuterol order with Frequency of every 4 hours PRN wheezing or increased work of breathing using Per Protocol order mode. Greater than 13 - enter or revise RT Bronchodilator order(s) to one equivalent RT bronchodilator order with every 4 hours Frequency and an Albuterol order with Frequency of every 2 hours PRN wheezing or increased work of breathing using Per Protocol order mode. RT to enter RT Home Evaluation for COPD & MDI Assessment order using Per Protocol order mode.     Electronically signed by Adi Millan RCP on 7/17/2022 at 6:07 AM

## 2022-07-17 NOTE — CONSULTS
INPATIENT PULMONARY CRITICAL CARE CONSULT NOTE      Chief Complaint/Referring Provider:  Patient is being seen at the request of April GONSALEZ for a consultation for B/L pleural effusions      Presenting HPI: Patient came to the hospital with shortness of breath     As per admitting provider-Igor Jose Alberto Puga is a 70-year-old male with significant past medical history of hypertension, hyperlipidemia, type 2 diabetes, ESRD on HD, congestive heart failure, and ischemic cardiomyopathy who presents to [facility] emergency department with complaint of shortness of breath and having fluid on his lungs. He is here very frequently for similar concerns with suspicion of nonadherence to his medical treatment plan for hypertension and ESRD. He is on dialysis Monday Wednesday and Fridays, he states that he went to dialysis this past Friday. He was here and was discharged from our emergency department for shortness of breath and was seen to have pleural effusions. Decided come back here tonight's due to worsening symptoms. Upon arrival, he was noted to be exquisitely hypertensive with blood pressures 200s over 100s, also tachycardic. Evaluation to include laboratory studies, EKG, and chest x-ray. Chest x-ray shows continued bilateral pulmonary opacities with right-sided pleural effusion. EKG was nonspecific without any acute ST segment or T wave deviations. Pertinent lab values tonight show sodium 131, chloride 90, BUN 48, creatinine 6.5, GFR 9.  Blood glucose is 152. BNP is greater than 70,000 and troponin is 0.12. Cell count showed mild leukocytosis tonight 11.9, H/H 10.6/32.5, and platelets 825. VBG in emergency department showed a pH of 7.36, PCO2 46, PO2 35, and HCO3 26. Patient was given labetalol and Lasix in the emergency department.   Nephrology on-call was consulted by ED provider who notes patient should be admitted, anticipate dialysis treatment for same the morning, and treat blood pressure with labetalol and hydralazine. Hospital team to admit with nephrology to follow.   Patient when seen this morning states that he started having more shortness of breath which came along with some chest pressure which made him come to the hospital, patient has no expectoration or any wheezing per se, patient did not have any significant fever or chills, no rhinorrhea nasal congestion sinus congestion postnasal drainage, no significant odynophagia or dysphagia, no fever or chills, patient did not have any significant abdominal discomfort, patient has had some leg edema, patient has been compliant with his use of CPAP/BiPAP at home along with that patient also has been doing his dialysis on regular basis, patient denies any increased use of salt or fluid intake, patient was about to get dialysis in about 4.2 kg removal was being contemplated as per HD nurse, patient is 2 kg over his dry weight as per HD nurse,no other pertinent ROS of concern        Patient Active Problem List    Diagnosis Date Noted    Hypotension due to drugs 11/25/2017    Acute on chronic combined systolic and diastolic heart failure ([address]     Ischemic cardiomyopathy     Dyslipidemia     Type 2 diabetes, uncontrolled, with neuropathy ([address] 03/04/2014    Bicuspid aortic valve 06/03/2013    CHF (congestive heart failure) ([address] 05/14/2013    Coronary artery disease of native artery of native heart with stable angina pectoris ([address] 05/14/2013    PVD (peripheral vascular disease) ([address] 05/14/2013    Hypertensive emergency 07/17/2022    SOB (shortness of breath) 07/17/2022    Altered mental status     Hypoglycemia 05/29/2022    Morbid obesity with BMI of 40.0-44.9, adult ([address] 04/26/2022    Former smoker 04/19/2022    Cardiomyopathy ([address] 04/19/2022    History of transcatheter aortic valve replacement (TAVR) 10/19/2019    HTN (hypertension), benign 11/14/2013    Asthma-COPD overlap syndrome ([address] 05/14/2013    Respiratory failure ([address] 04/18/2022    Pulmonary edema with diastolic CHF, NYHA class 3 ([address] 03/17/2022    [place]iberty-rectal abscess     Somnolence     Atrial flutter ([address]     SIRS (systemic inflammatory response syndrome) ([address] 02/21/2022    NSTEMI (non-ST elevated myocardial infarction) ([address] 01/12/2022    Acute respiratory failure with hypercapnia ([address] 11/30/2021    Acute pulmonary edema (HCC) 11/30/2021    Grade II diastolic dysfunction 09/70/3536    Shock circulatory ([address] 11/30/2021    Smoker 11/30/2021    Normocytic normochromic anemia 11/30/2021    Respiratory failure with hypoxia ([address] 11/29/2021    Speech problem     Urinary tract infection with hematuria     Acute CVA (cerebrovascular accident) ([address] 09/07/2021    Acute hypoxemic respiratory failure ([address] 08/12/2021    Type 2 diabetes mellitus with hyperglycemia ([address] 06/27/2021    Acute encephalopathy 06/27/2021    Cellulitis and abscess of hand 04/24/2021    Iliac artery occlusion, right (HCC)     Cellulitis of right foot 01/29/2021    Peripheral vascular occlusive disease ([address] 01/02/2021    Ataxia     Weakness of both lower extremities 12/30/2020    Sinus bradycardia 12/30/2020    Sinus pause     GBS (Guillain-[place] syndrome) ([facility])     Bilateral leg weakness 12/04/2020    Bradycardia 10/19/2019    Syncope and collapse 10/19/2019    PAF (paroxysmal atrial fibrillation) ([address]     Hypercholesteremia 07/30/2019    Chronic bronchitis ([address] 05/21/2019    Nasal congestion 05/21/2019    Chronic, continuous use of opioids 04/15/2019    Steal syndrome of dialysis vascular access (HCC)     SOB (shortness of breath) on exertion 12/24/2018    Anemia of chronic disease 11/12/2018    Pulmonary edema 11/09/2018    Fluid overload 11/09/2018    Renovascular hypertension     Mixed hyperlipidemia     Cigarette nicotine dependence in remission     Neuromuscular disorder ([address]     Acute diastolic CHF (congestive heart failure) ([address] 03/18/2018    Hemodialysis-associated hypotension 11/22/2017    ESRD (end stage renal disease) on dialysis ([address] 11/22/2017    Mucus plugging of bronchi     Nonrheumatic aortic valve stenosis     Pleural effusion     Chronic anemia     History of CVA (cerebrovascular accident)     Type 2 diabetes mellitus without complication, without long-term current use of insulin ([facility])     ZAINAB (obstructive sleep apnea)     Tobacco abuse 05/21/2017    CVA (cerebral vascular accident) ([address] 05/21/2017    Angina, class IV ([address]     Dyspnea     Gastroparesis due to DM ([address]     Biliary colic 12/35/8705    Symptomatic cholelithiasis 01/04/2017    Degeneration of lumbar or lumbosacral intervertebral disc 03/18/2016    Lumbar radiculopathy 03/18/2016    Lumbosacral spondylosis without myelopathy 03/18/2016    ZAINAB on CPAP 02/11/2016    Obesity (BMI 30-39. 9)     PNA (pneumonia) 03/28/2015    Depression with anxiety 06/05/2014    Passive smoke exposure 05/30/2014    Diabetic neuropathy ([address] 11/14/2013    Claudication in peripheral vascular disease ([address] 06/26/2013    Bilateral hilar adenopathy syndrome 06/03/2013    Sepsis without acute organ dysfunction ([address] 12/25/2012       Past Medical History:   Diagnosis Date    Ambulatory dysfunction     walker for long distances, SOB with distance    Aortic stenosis     echo 2017    Arthritis     hands and hips    Asthma     Bilateral hilar adenopathy syndrome 6/3/2013    CAD (coronary artery disease)     Dr. Christiano Chris [facility]) 04/19/2019    EF= 43%    CHF (congestive heart failure) ([facility])     Chronic pain     COPD (chronic obstructive pulmonary disease) ([address]     pulmonology Dr. Elkin Christensen    Depression     Diabetes mellitus ([address]     borderline    Difficult intravenous access     Emphysema of lung ([address]     ESRD (end stage renal disease) on dialysis ([address]     MWF    Fear of needles     Gastric ulcer     GERD (gastroesophageal reflux disease)     Heart valve problem     bicuspic valve    Hemodialysis patient ([address]     History of spinal fracture     work incident    Hx of blood clots     Bilateral lower extremities; stents in place    Hyperlipidemia     Hypertension     MI (myocardial infarction) ([facility] 2019    has had 9 MIs. 2019 was the last    Neuromuscular disorder ([address]     due to CVA    Numbness and tingling in left arm     from fistula    Pneumonia     PONV (postoperative nausea and vomiting)     Prolonged emergence from general anesthesia     States requires more medication than most people    Sleep apnea     Uses CPAP    Stroke ([facility]     7mm thalamic cva 2017 deficts left side, left side weakness    TIA (transient ischemic attack)     Unspecified diseases of blood and blood-forming organs         Past Surgical History:   Procedure Laterality Date    AORTIC VALVE REPLACEMENT N/A 10/15/2019    TRANSCATHETER AORTIC VALVE REPLACEMENT FEMORAL APPROACH performed by Karina Rebolledo MD at [address] [facility] Right 7/2/2019    PERITONEAL DIALYSIS CATHETER REMOVAL performed by Lazaro He MD at [address] [place] [address]  2/29/2015    WN    CORONARY ANGIOPLASTY WITH STENT PLACEMENT  05/26/15    CYST REMOVAL  08/14/2013    EXCISION CYSTS, NECK X2 AND ABDOMINAL benign    DIAGNOSTIC CARDIAC CATH LAB PROCEDURE      DIALYSIS FISTULA CREATION Left 10/30/2017    LEFT BRACHIAL CEPHALIC FISTULA    DIALYSIS FISTULA CREATION Left 3/27/2019    LIGATION  AV FISTULA performed by Clarisa Cruz MD at [address] [facility], COLON, DIAGNOSTIC      IR TUNNELED CATHETER PLACEMENT GREATER THAN 5 YEARS  3/21/2022    IR TUNNELED CATHETER PLACEMENT GREATER THAN 5 YEARS 3/21/2022 [facility]    IR TUNNELED CATHETER PLACEMENT GREATER THAN 5 YEARS  4/21/2022    IR TUNNELED CATHETER PLACEMENT GREATER THAN 5 YEARS 4/21/2022 [facility]    IR TUNNELED CATHETER PLACEMENT GREATER THAN 5 YEARS  4/26/2022    IR TUNNELED CATHETER PLACEMENT GREATER THAN 5 YEARS 4/26/2022 [facility]    IR TUNNELED CATHETER PLACEMENT GREATER THAN 5 YEARS  2022    IR TUNNELED CATHETER PLACEMENT GREATER THAN 5 YEARS 2022 [facility]    OTHER SURGICAL HISTORY  2017    laparoscopic cholecystectomy with intraoperative cholangiogram    OTHER SURGICAL HISTORY  2018    PORT PLACEMENT  - vas cath    OTHER SURGICAL HISTORY Bilateral 2018    laprascopic peritoneal dialysis catheter placement    OTHER SURGICAL HISTORY Right 2018    peritoneal dialysis port placed on right side of abdomen    OTHER SURGICAL HISTORY  2019    PTA/Stenting R External Iliac artery    OR LAP INSERTION TUNNELED INTRAPERITONEAL CATHETER N/A 2018    LAPAROSCOPIC PERITONEAL DIALYSIS CATHETER REPLACEMENT performed by Vu Parry MD at [address] [facility] [address] 2022    PERINEAL ABCESS DRAINAGE performed by Vu Parry MD at [address]  2016    UPPER GASTROINTESTINAL ENDOSCOPY  2017    possible candida, otherwise normal appearing    VASCULAR SURGERY  aprx 2 years ago    2 stents placed, each side of groin        Family History   Problem Relation Age of Onset    Diabetes Mother     Heart Disease Father     Kidney Disease Sister         stage 4-kidney failure    Cancer Sister     Heart Disease Sister     Obesity Sister     Cancer Sister     Heart Disease Sister     Obesity Sister     Alcohol Abuse Brother         Social History     Tobacco Use    Smoking status: Former     Packs/day: 0.50     Years: 33.00     Pack years: 16.50     Types: Cigarettes     Quit date: 2020     Years since quittin.2    Smokeless tobacco: Never    Tobacco comments:     States quit 2021   Substance Use Topics    Alcohol use: Not Currently     Alcohol/week: 0.0 standard drinks     Comment: occ        Allergies   Allergen Reactions    Morphine Nausea And Vomiting               Physical Exam:  Blood pressure (!) 140/70, pulse 84, temperature 98.6 °F (37 °C), temperature source Oral, resp. rate 18, height 5' 9\" (1.753 m), weight 235 lb 14.3 oz (107 kg), SpO2 98 %.'     Constitutional: In distinct shortness of breath with increased work of breathing on BiPAP when seen  HENT: BiPAP mask intact no thyromegaly. Eyes:  Conjunctivae are normal. Pupils equal, round, and reactive to light. No scleral icterus. Neck: . No tracheal deviation present. No obvious thyroid mass. Short large neck  Cardiovascular: Normal rate, regular rhythm, normal heart sounds. No right ventricular heave. some lower extremity edema. Pulmonary/Chest: No wheezes. Bilateral rales. Chest wall is not dull to percussion. Some accessory muscle usage or stridor. Decreased breath sound intensity(L>R)  Abdominal: Soft. Bowel sounds present. No distension or hernia. No tenderness. Obese  Musculoskeletal: No cyanosis. No clubbing. No obvious joint deformity. Lymphadenopathy: No cervical or supraclavicular adenopathy. Skin: Skin is warm and dry. No rash or nodules on the exposed extremities. Psychiatric: Normal reaction ;no anxiety   Neurologic Alert and communicative       Results:  CBC:   Recent Labs     07/16/22  0045 07/17/22  0208   WBC 9.6 11.9*   HGB 10.5* 10.6*   HCT 31.6* 32.5*   MCV 90.1 90.5    246     BMP:   Recent Labs     07/16/22  0045 07/17/22  0208   * 131*   K 3.9 4.3   CL 90* 90*   CO2 28 25   BUN 34* 48*   CREATININE 5.0* 6.5*     LIVER PROFILE:   Recent Labs     07/16/22  0045 07/17/22  0208   AST 11* 16   ALT 8* 8*   BILITOT <0.2 0.3   ALKPHOS 94 95     PT/INR:   Recent Labs     07/17/22  0310   PROTIME 16.7*   INR 1.37*     APTT:   Recent Labs     07/17/22 0310   APTT 33.1       Imaging:  I have reviewed radiology images personally. XR CHEST PORTABLE   Final Result   Left basilar opacification and pleural effusion are redemonstrated.       Pulmonary vascular congestion with interstitial and perihilar opacities   suggesting edema has mildly with pleural fluid tracking of the left lower chest.  Pulmonary vascular congestion with interstitial and perihilar opacities suggesting edema in both lungs. The right hemidiaphragm and costophrenic angle are still visualized. No pneumothorax is seen. Cardiac silhouette is stable. Left-sided dialysis catheter is unchanged. Underpenetration of the spine and lower ribs. Left basilar opacification and pleural effusion are redemonstrated. Pulmonary vascular congestion with interstitial and perihilar opacities suggesting edema has mildly increased. XR CHEST PORTABLE    Result Date: 7/16/2022  EXAMINATION: ONE XRAY VIEW OF THE CHEST 7/16/2022 12:48 am COMPARISON: 07/05/2022 HISTORY: ORDERING SYSTEM PROVIDED HISTORY: sob, cough TECHNOLOGIST PROVIDED HISTORY: Reason for exam:->sob, cough Reason for Exam: sob, cough FINDINGS: The cardiomediastinal silhouette is mildly enlarged. Mild prominence of the perihilar vasculature. Left-sided tunneled hemodialysis catheter identified tip is in satisfactory location. Evidence of previous TAVR. Left basilar opacity suggestive left-sided airspace disease and left-sided effusion. Valentino Nancy No pneumothorax. No right-sided     Moderate left basilar opacities suggest airspace disease and left-sided effusion. Cardiomegaly with mild perihilar congestion. XR CHEST PORTABLE    Result Date: 7/5/2022  EXAMINATION: ONE XRAY VIEW OF THE CHEST 7/5/2022 7:34 pm COMPARISON: July 4, 2022 HISTORY: ORDERING SYSTEM PROVIDED HISTORY: pain or SOB TECHNOLOGIST PROVIDED HISTORY: Reason for exam:->pain or SOB Reason for Exam: chest pain ,sob FINDINGS: Marginal inspiration is present. Cardiomegaly is noted. Mild vascular congestion is present. Minimally improved aeration is present within the right lung base. Extensive opacification is again noted within the left mid to lower lung zone, likely represent a combination of pneumonia, atelectasis, and moderate size left pleural effusion.   Left IJ dialysis catheter is in place, tip overlying the junction of the SVC and right atrium. Patient is status post prior TAVR. 1. Cardiomegaly, with mild vascular congestion 2. Persistent left basilar opacification, and left pleural effusion. Opacification may be on the basis of atelectasis, pneumonia and effusion 3. Improved aeration present within the right lung base, consistent with resolving atelectasis and or pneumonia     XR CHEST PORTABLE    Result Date: 7/4/2022  EXAMINATION: ONE XRAY VIEW OF THE CHEST 7/4/2022 3:40 pm COMPARISON: 06/23/2022 HISTORY: ORDERING SYSTEM PROVIDED HISTORY: sob TECHNOLOGIST PROVIDED HISTORY: Reason for exam:->sob Reason for Exam: sob FINDINGS: The cardiomediastinal silhouette is stable. Previous TAVR. Persistent left lower lobe opacification and effusion. Mild dependent changes at the right base are stable. No overt failure. Left-sided dialysis catheter. Persistent left lower lobe opacification and effusion, likely atelectasis although infiltrate not excluded. No overt failure. XR CHEST PORTABLE    Result Date: 6/23/2022  EXAMINATION: ONE XRAY VIEW OF THE CHEST 6/23/2022 11:08 am COMPARISON: June 21, 2022 HISTORY: ORDERING SYSTEM PROVIDED HISTORY: Shortness of breath TECHNOLOGIST PROVIDED HISTORY: Reason for exam:->Shortness of breath Reason for Exam: sob FINDINGS: Mild cardiomegaly. Mild-to-moderate congestion and/or infiltrates identified in the lungs. Left-sided sent line tip is in the right atrium. No pneumothorax. Mild osteopenic changes and degenerative changes identified in the bony structures. .     Mild cardiomegaly. Mild-to-moderate congestion and/or infiltrates identified in the lungs. No significant improvement from recent study.      XR CHEST PORTABLE    Result Date: 6/21/2022  EXAMINATION: ONE XRAY VIEW OF THE CHEST 6/21/2022 2:11 am COMPARISON: 06/05/2022 HISTORY: ORDERING SYSTEM PROVIDED HISTORY: sob TECHNOLOGIST PROVIDED HISTORY: Reason for exam:->sob Reason for Exam: sob x 3 hours FINDINGS: Left internal jugular dialysis catheter in place. Status post endovascular repair of the aortic valve. Vascular congestion with septal thickening and small left effusion are noted. No pneumothorax identified. Findings compatible with interstitial edema and small left effusion. IR TUNNELED CVC PLACE WO SQ PORT/PUMP > 5 YEARS    Result Date: 6/24/2022  PROCEDURE: FLUOROSCOPY GUIDED TUNNELED CATHETER EXCHANGE. FIBRIN SHEATH DISRUPTION MODERATE CONSCIOUS SEDATION 6/23/2022. HISTORY: ORDERING SYSTEM PROVIDED HISTORY: dialysis catheter exchange due to malfunction, on eliquis TECHNOLOGIST PROVIDED HISTORY: Reason for exam:->dialysis catheter exchange due to malfunction, on eliquis How many lumens are being requested?->2 What side should this line be placed? ->Left What site is the preferred site? ->Internal Jugular Patient has at this catheter exchange several times, with continue difficulty at this time with slow flow. SEDATION: 2 mgversed and 100 mcg fentanyl were titrated intravenously for moderate sedation monitored under my direction. Total intraservice time of sedation was 31 minutes. The patient's vital signs were monitored throughout the procedure and recorded in the patient's medical record by the nurse. Sedation monitoring started at 1527 and ended at 1558. FLUOROSCOPY DOSE AND TYPE OR TIME AND EXPOSURES: 3 minutes 50 seconds, 84 mGy reference air kerma TECHNIQUE: Informed consent was obtained after a detailed explanation of the procedure including risks, benefits, and alternatives. All aspects of maximum sterile barrier technique were used including washing hands with conventional soap and water or with alcohol-based hand rubs (ABHR), skin preparation, cap, mask, sterile gown, sterile gloves, and sterile full body drape. Local anesthesia was achieved with lidocaine. A stiff Glidewire was then inserted through the existing dialysis catheter. Following this, the catheter and cuff were successfully removed without difficulty. Using a 65 cm 5 Yoruba catheter, the Glidewire was advanced into the inferior vena cava. This was then exchanged for an Amplatz wire. Over the Amplatz wire, a 12 mm balloon was then inserted into the right atrium, inflated, and drawn back to the level of the proximal SVC, then readvanced and withdrawn again with a couple passes made. Following this, the balloon was deflated and removed over the wire. Over the wire, a new 23 cm tunneled dialysis catheter was placed. This was secured to the skin with suture. The patient tolerated the procedure well. Excellent aspiration and flushing from the catheter. FINDINGS: Fluoroscopic image demonstrates the tip of the catheter in the right atrium. Successful fluoroscopic guided exchange of a tunneled dialysis catheter and fibrin sheath disruption. Echocardiogram:6/3/22   Conclusions      Summary   Definity® used for myocardial border enhancement. Left ventricular systolic function is severely reduced with a visually   estimated ejection fraction of 20-25 %. EF estimated by Jasmine's method at 24 %. The left ventricle is mildly dilated in size with normal wall thickness. Severe global hypokinesis with regional variation. Grade I diastolic dysfunction with normal LV pressure. There is no evidence of a left ventricular thrombus. Right ventricular systolic function is reduced. A 29 mm Langford Leyda S3 bioprosthetic aortic valve appears well seated   with normal Doppler values. Inadequate tricuspid valve regurgitation to estimate systolic pulmonary   artery pressure. There is a moderate left pleural effusion noted. ECHO in Jan 2022-Summary   Limited only f/u for LVEF and RVF. The left ventricular systolic function is mildly reduced with an ejection   fraction of 40-45 %. There is hypokinesis of the apex and inferior walls.    There is a very small circumferential pericardial effusion noted. No tamponade physiology. The right ventricle is normal in size and function. Cardiac cath -6/7/22-Cath: 6/7/2022   LM Less than 10% [id number] stenosis            LAD Moderately calcified, 20 to 30% proximal-mid stenosis, distal vessel tapers at the apex with 60% diffuse disease. D1 has an ostial/proximal 75 to 80% stenosis. LCX  Calcified, proximal-mid 75 to 80% stenosis. Distal vessel is tortuous with 70 to 85% diffuse stenosis with more discrete stenosis noted distally. OM 1 is a very small vessel with ostial/proximal 80% stenosis and 60 to 70% diffuse disease in the [id number]-[id number]-oqflfy vessel. Tyler Power RCA Dominant, calcified, tortuous, there is a proximal-mid stent with 60 to 70% in-stent restenosis. Distal vessel 20 to 30% stenosis   Mild to moderately increased filling pressures  Patent RCA stent with moderate to borderline severe in-stent restenosis  Severe circumflex and D1 stenosis  Continue medical management, consider outpatient high risk PCI of above territories pending outpatient clinical follow-up. Currently medical management seems best given comorbidities and need for additional fluid removal.  If PCI is pursued, will likely need general anesthesia. Patient had difficulty lying still on Cath Lab table. PFT:PFT:2016-INDICATIONS:  COPD. 1.  SPIROMETRY:  The FEV-1 is 1.71 L which is 44% predicted. The FEV-1/FVC  ratio is normal.  Inhaled bronchodilators are given. There is significant  improvement in the FEV-1 at 29%. 2.  LUNG VOLUMES:  Total lung capacity is normal at 6.08 L or 86% predicted. 3.  DIFFUSION CAPACITY:  DLCO is 28.26 mL per minute per mmHg which is 79%  predicted. 4.  FLOW VOLUME LOOP:  Does not show an obvious obstructive pattern. No variable or fixed obstruction pattern on the flow volume loop done in 2016     IMPRESSION:  There are symmetric reductions in the FEV-1 and the forced vital  capacity. There is also broncho reactivity. Air trapping is present. Mild  reduction in diffusion capacity is present. While not a typical obstructive  pattern, given the tobacco history, smoking related lung disease is still the  most likely culprit here. ONE XRAY VIEW OF THE CHEST       7/17/2022 2:01 am       COMPARISON:   07/16/2022 and 07/05/2022       HISTORY:   ORDERING SYSTEM PROVIDED HISTORY: sob   TECHNOLOGIST PROVIDED HISTORY:   Reason for exam:->sob   Reason for Exam: SOB, hx of fluid in lungs       FINDINGS:   Left basilar opacification is again noted with pleural fluid tracking of the   left lower chest.  Pulmonary vascular congestion with interstitial and   perihilar opacities suggesting edema in both lungs. The right hemidiaphragm   and costophrenic angle are still visualized. No pneumothorax is seen. Cardiac silhouette is stable. Left-sided dialysis catheter is unchanged. Underpenetration of the spine and lower ribs. Impression   Left basilar opacification and pleural effusion are redemonstrated. Pulmonary vascular congestion with interstitial and perihilar opacities   suggesting edema has mildly increased.         Latest Reference Range & Units 7/4/22 15:34 7/5/22 22:55 7/17/22 02:08   pH, Blade 7.350 - 7.450  7.250 (L) 7.241 (L) 7.366   pCO2, Blade 40.0 - 50.0 mmHg 47.0 49.2 46.8   pO2, Blade 25.0 - 40.0 mmHg 36.6 43.9 (H) 35.2   HCO3, Venous 23.0 - 29.0 mmol/L 20.1 (L) 20.7 (L) 26.2   TC02 (Calc), Blade Not Established mmol/L 22 22 28   Base Excess, Blade -3.0 - 3.0 mmol/L -7.0 (L) -6.7 (L) 0.5   MetHgb, Blade <1.5 % 0.2 0.2 0.4   O2 Sat, Blade Not Established % 63 69 63         Assessment:  Principal Problem:    Hypertensive emergency  Active Problems:    CHF (congestive heart failure) (HCC)    Coronary artery disease of native artery of native heart with stable angina pectoris (HCC)    Type 2 diabetes, uncontrolled, with neuropathy (HCC)    Acute on chronic combined systolic and diastolic heart failure (HCC)    Asthma-COPD overlap syndrome ([facility]    Former smoker    Cardiomyopathy ([facility]    SOB (shortness of breath)    ZAINAB on CPAP    Pleural effusion    ESRD (end stage renal disease) on dialysis [facility])  Resolved Problems:    * No resolved hospital problems. *          Plan:     Oxygen supplementation  to keep saturation being 90-94% only  Please titrate down the oxygen as per the above parameters  BIPAP on intermittent basis  Patient's acid-base balance is maintained   Pulmonary toilet  Patient's x-ray chest shows patient to have mild pulmonary venous congestion and left pleural effusion   Patient does not need any antibiotics from pulmonary standpoint of view  HD as per nephrology-getting one when seen and 4.2 Kg removal being contemplated   Antihypertensives as per IM/nephrology   Patient may need dry weight to be modified-nephrology to decide upon that   Patient has left pleural effusion -removal of fluid will not change the long term symptoms and patient is on Plavix and Eliquis which cannot be stopped secondary to cardiac interventions  Optimization of cardiac medications as per cardiology  Recent cardiac cath findings and recommendations reviewed and as per cardiology -Severe circumflex and D1 stenosis  Continue medical management, consider outpatient high risk PCI of above territories pending outpatient clinical follow-up. Currently medical management seems best given comorbidities and need for additional fluid removal.  If PCI is pursued, will likely need general anesthesia. Patient had difficulty lying still on Cath Lab table.   Bronchodilators  No need for  any systemic steroids at this time  Lantus insulin as per blood glucose monitoring as per IM  Blood glucose monitoring with sliding scale insulin  Trend hemodynamics and cardiac rhythm closely  Diet and life style modifications  Patient needs to lose weight   PUD prophylaxis      Electronically signed by:  Maurice Batista Ari Nash MD    7/17/2022    7:26 PM.

## 2022-07-18 LAB
ALBUMIN SERPL-MCNC: 3.7 G/DL (ref 3.4–5)
ANION GAP SERPL CALCULATED.3IONS-SCNC: 17 MMOL/L (ref 3–16)
BUN BLDV-MCNC: 49 MG/DL (ref 7–20)
CALCIUM SERPL-MCNC: 9 MG/DL (ref 8.3–10.6)
CHLORIDE BLD-SCNC: 92 MMOL/L (ref 99–110)
CHOLESTEROL, TOTAL: 147 MG/DL (ref 0–199)
CO2: 23 MMOL/L (ref 21–32)
CREAT SERPL-MCNC: 5.5 MG/DL (ref 0.9–1.3)
EKG ATRIAL RATE: 106 BPM
EKG DIAGNOSIS: NORMAL
EKG P AXIS: 69 DEGREES
EKG P-R INTERVAL: 164 MS
EKG Q-T INTERVAL: 342 MS
EKG QRS DURATION: 90 MS
EKG QTC CALCULATION (BAZETT): 454 MS
EKG R AXIS: -41 DEGREES
EKG T AXIS: 89 DEGREES
EKG VENTRICULAR RATE: 106 BPM
GFR AFRICAN AMERICAN: 13
GFR NON-AFRICAN AMERICAN: 11
GLUCOSE BLD-MCNC: 233 MG/DL (ref 70–99)
GLUCOSE BLD-MCNC: 239 MG/DL (ref 70–99)
GLUCOSE BLD-MCNC: 249 MG/DL (ref 70–99)
GLUCOSE BLD-MCNC: 310 MG/DL (ref 70–99)
HDLC SERPL-MCNC: 52 MG/DL (ref 40–60)
LDL CHOLESTEROL CALCULATED: 80 MG/DL
MAGNESIUM: 1.8 MG/DL (ref 1.8–2.4)
PERFORMED ON: ABNORMAL
PHOSPHORUS: 5.3 MG/DL (ref 2.5–4.9)
POTASSIUM SERPL-SCNC: 4.3 MMOL/L (ref 3.5–5.1)
SODIUM BLD-SCNC: 132 MMOL/L (ref 136–145)
TRIGL SERPL-MCNC: 76 MG/DL (ref 0–150)
VLDLC SERPL CALC-MCNC: 15 MG/DL

## 2022-07-18 PROCEDURE — 6370000000 HC RX 637 (ALT 250 FOR IP): Performed by: NURSE PRACTITIONER

## 2022-07-18 PROCEDURE — 1200000000 HC SEMI PRIVATE

## 2022-07-18 PROCEDURE — 94660 CPAP INITIATION&MGMT: CPT

## 2022-07-18 PROCEDURE — 94761 N-INVAS EAR/PLS OXIMETRY MLT: CPT

## 2022-07-18 PROCEDURE — 80069 RENAL FUNCTION PANEL: CPT

## 2022-07-18 PROCEDURE — 83735 ASSAY OF MAGNESIUM: CPT

## 2022-07-18 PROCEDURE — 6370000000 HC RX 637 (ALT 250 FOR IP): Performed by: INTERNAL MEDICINE

## 2022-07-18 PROCEDURE — 6360000002 HC RX W HCPCS

## 2022-07-18 PROCEDURE — 99233 SBSQ HOSP IP/OBS HIGH 50: CPT | Performed by: NURSE PRACTITIONER

## 2022-07-18 PROCEDURE — 93005 ELECTROCARDIOGRAM TRACING: CPT | Performed by: NURSE PRACTITIONER

## 2022-07-18 PROCEDURE — 2700000000 HC OXYGEN THERAPY PER DAY

## 2022-07-18 PROCEDURE — 93010 ELECTROCARDIOGRAM REPORT: CPT | Performed by: INTERNAL MEDICINE

## 2022-07-18 PROCEDURE — 2580000003 HC RX 258: Performed by: NURSE PRACTITIONER

## 2022-07-18 PROCEDURE — 90935 HEMODIALYSIS ONE EVALUATION: CPT

## 2022-07-18 PROCEDURE — 36415 COLL VENOUS BLD VENIPUNCTURE: CPT

## 2022-07-18 RX ORDER — DOXERCALCIFEROL 2 UG/ML
8 INJECTION, SOLUTION INTRAVENOUS
Status: DISCONTINUED | OUTPATIENT
Start: 2022-07-18 | End: 2022-07-21 | Stop reason: HOSPADM

## 2022-07-18 RX ORDER — HEPARIN SODIUM 1000 [USP'U]/ML
INJECTION, SOLUTION INTRAVENOUS; SUBCUTANEOUS
Status: COMPLETED
Start: 2022-07-18 | End: 2022-07-18

## 2022-07-18 RX ORDER — DOXERCALCIFEROL 2 UG/ML
INJECTION, SOLUTION INTRAVENOUS
Status: COMPLETED
Start: 2022-07-18 | End: 2022-07-18

## 2022-07-18 RX ADMIN — PRAVASTATIN SODIUM 40 MG: 40 TABLET ORAL at 18:53

## 2022-07-18 RX ADMIN — DOXERCALCIFEROL 8 MCG: 2 INJECTION, SOLUTION INTRAVENOUS at 17:56

## 2022-07-18 RX ADMIN — ISOSORBIDE DINITRATE 20 MG: 20 TABLET ORAL at 22:30

## 2022-07-18 RX ADMIN — LABETALOL HYDROCHLORIDE 10 MG: 5 INJECTION INTRAVENOUS at 19:16

## 2022-07-18 RX ADMIN — OXYCODONE AND ACETAMINOPHEN 1 TABLET: 7.5; 325 TABLET ORAL at 18:53

## 2022-07-18 RX ADMIN — APIXABAN 2.5 MG: 2.5 TABLET, FILM COATED ORAL at 22:36

## 2022-07-18 RX ADMIN — METOPROLOL SUCCINATE 50 MG: 50 TABLET, EXTENDED RELEASE ORAL at 22:29

## 2022-07-18 RX ADMIN — CLOPIDOGREL BISULFATE 75 MG: 75 TABLET ORAL at 18:53

## 2022-07-18 RX ADMIN — DOXERCALCIFEROL 8 MCG: 4 INJECTION, SOLUTION INTRAVENOUS at 17:56

## 2022-07-18 RX ADMIN — SACUBITRIL AND VALSARTAN 2 TABLET: 24; 26 TABLET, FILM COATED ORAL at 22:30

## 2022-07-18 RX ADMIN — HEPARIN SODIUM 3600 UNITS: 1000 INJECTION INTRAVENOUS; SUBCUTANEOUS at 16:39

## 2022-07-18 RX ADMIN — EPOETIN ALFA-EPBX 5000 UNITS: 10000 INJECTION, SOLUTION INTRAVENOUS; SUBCUTANEOUS at 17:57

## 2022-07-18 RX ADMIN — PANTOPRAZOLE SODIUM 40 MG: 40 TABLET, DELAYED RELEASE ORAL at 18:53

## 2022-07-18 RX ADMIN — Medication 10 ML: at 08:47

## 2022-07-18 RX ADMIN — TRAZODONE HYDROCHLORIDE 100 MG: 50 TABLET ORAL at 22:30

## 2022-07-18 RX ADMIN — OXYCODONE AND ACETAMINOPHEN 1 TABLET: 7.5; 325 TABLET ORAL at 00:54

## 2022-07-18 ASSESSMENT — PAIN DESCRIPTION - LOCATION: LOCATION: BACK;HIP

## 2022-07-18 ASSESSMENT — PAIN SCALES - GENERAL
PAINLEVEL_OUTOF10: 9
PAINLEVEL_OUTOF10: 9
PAINLEVEL_OUTOF10: 8

## 2022-07-18 ASSESSMENT — PAIN DESCRIPTION - ORIENTATION: ORIENTATION: LOWER

## 2022-07-18 ASSESSMENT — PAIN DESCRIPTION - DESCRIPTORS: DESCRIPTORS: DISCOMFORT;ACHING

## 2022-07-18 NOTE — DISCHARGE INSTR - COC
Continuity of Care Form    Patient Name: Jaydon Smith   :  1970  MRN:  [id number]    Admit date:  2022  Discharge date:  ***    Code Status Order: Full Code   Advance Directives:     Admitting Physician:  Leandro Mauricio MD  PCP: Cristal Rosenthal MD    Discharging Nurse: [facility] Unit/Room#: 7006/4983-85  Discharging Unit Phone Number: ***    Emergency Contact:   Extended Emergency Contact Information  Primary Emergency Contact: Crystal Ann  Address: [address] [place] [address] [address] [facility], [address] [facility],[address] Phone: [phone]  Mobile Phone: [phone]  Relation: Spouse  Secondary Emergency Contact: [facility] Phone: [phone]  Relation: Brother/Sister  Preferred language: English   needed?  No    Past Surgical History:  Past Surgical History:   Procedure Laterality Date    AORTIC VALVE REPLACEMENT N/A 10/15/2019    TRANSCATHETER AORTIC VALVE REPLACEMENT FEMORAL APPROACH performed by Gillian Cisneros MD at [address] [facility] Right 2019    PERITONEAL DIALYSIS CATHETER REMOVAL performed by Jeanne Meza MD at [address]      [place]    CORONARY ANGIOPLASTY WITH STENT PLACEMENT  05/26/15    CYST REMOVAL  2013    EXCISION CYSTS, NECK X2 AND ABDOMINAL benign    DIAGNOSTIC CARDIAC CATH LAB PROCEDURE      DIALYSIS FISTULA CREATION Left 10/30/2017    LEFT BRACHIAL CEPHALIC FISTULA    DIALYSIS FISTULA CREATION Left 3/27/2019    LIGATION  AV FISTULA performed by Jocelin Gaspar MD at [address] [place] [address], DIAGNOSTIC      IR TUNNELED CATHETER PLACEMENT GREATER THAN 5 YEARS  3/21/2022    IR TUNNELED CATHETER PLACEMENT GREATER THAN 5 YEARS 3/21/2022 [facility]    IR TUNNELED CATHETER PLACEMENT GREATER THAN 5 YEARS  2022    IR TUNNELED CATHETER PLACEMENT GREATER THAN 5 YEARS 2022 [facility]    IR TUNNELED CATHETER PLACEMENT GREATER THAN 5 YEARS  4/26/2022    IR TUNNELED CATHETER PLACEMENT GREATER THAN 5 YEARS 4/26/2022 [facility]    IR TUNNELED CATHETER PLACEMENT GREATER THAN 5 YEARS  6/23/2022    IR TUNNELED CATHETER PLACEMENT GREATER THAN 5 YEARS 6/23/2022 [facility]    OTHER SURGICAL HISTORY  02/01/2017    laparoscopic cholecystectomy with intraoperative cholangiogram    OTHER SURGICAL HISTORY  2018    PORT PLACEMENT  - vas cath    OTHER SURGICAL HISTORY Bilateral 06/26/2018    laprascopic peritoneal dialysis catheter placement    OTHER SURGICAL HISTORY Right 09/2018    peritoneal dialysis port placed on right side of abdomen    OTHER SURGICAL HISTORY  05/28/2019    PTA/Stenting R External Iliac artery    MS LAP INSERTION TUNNELED INTRAPERITONEAL CATHETER N/A 9/21/2018    LAPAROSCOPIC PERITONEAL DIALYSIS CATHETER REPLACEMENT performed by Megan Smith MD at [address] [place] [address] Pkwy 2/24/2022    PERINEAL ABCESS DRAINAGE performed by Megan Smith MD at [facility].  01/06/2016    UPPER GASTROINTESTINAL ENDOSCOPY  01/29/2017    possible candida, otherwise normal appearing    VASCULAR SURGERY  aprx 2 years ago    2 stents placed, each side of groin       Immunization History:   Immunization History   Administered Date(s) Administered    Hepatitis B Adult (Engerix-B) 11/18/2017, 06/13/2018, 07/13/2018    Influenza Virus Vaccine 12/27/2012, 10/07/2014, 11/20/2015, 10/16/2019    Influenza, Intradermal, Quadrivalent, Preservative Free 11/16/2016    Influenza, MDCK Quadv, IM, PF (Flucelvax 2 yrs and older) 10/14/2017, 09/30/2020, 10/05/2021    Influenza, Fidel Staple, 6 mo and older, IM, PF (Flulaval, Fluarix) 09/15/2018    Influenza, Quadv, IM, PF (6 mo and older Fluzone, Flulaval, Fluarix, and 3 yrs and older Afluria) 10/16/2019    PPD Test 11/09/2017, 11/16/2017, 01/03/2019, 01/06/2020, 01/15/2021    Pneumococcal Conjugate 13-valent ([id number]) 10/14/2017    Pneumococcal Polysaccharide ([id number]) 10/07/2014, 11/19/2019    Tdap (Boostrix, Adacel) 02/13/2020    Zoster Recombinant (Shingrix) 02/13/2020       Active Problems:  Patient Active Problem List   Diagnosis Code    Sepsis without acute organ dysfunction ([facility]) A41.9    CHF (congestive heart failure) ([facility]) I50.9    Asthma-COPD overlap syndrome ([facility] J44.9    Coronary artery disease of native artery of native heart with stable angina pectoris ([facility]) I25.118    PVD (peripheral vascular disease) ([facility] I73.9    Bicuspid aortic valve Q23.1    Bilateral hilar adenopathy syndrome R59.0    Claudication in peripheral vascular disease ([facility]) I73.9    HTN (hypertension), benign I10    Diabetic neuropathy ([facility]) E11.40    Type 2 diabetes, uncontrolled, with neuropathy ([facility] E11.40, E11.65    Passive smoke exposure Z77.22    Depression with anxiety F41.8    PNA (pneumonia) J18.9    Obesity (BMI 30-39. 9) E66.9    ZAINAB on CPAP G47.33, Z99.89    Degeneration of lumbar or lumbosacral intervertebral disc M51.37    Lumbar radiculopathy M54.16    Lumbosacral spondylosis without myelopathy A91.349    Biliary colic D85.07    Symptomatic cholelithiasis K80.20    Gastroparesis due to DM ([facility]) E11.43, K31.84    Angina, class IV ([facility]) I20.9    Dyspnea R06.00    Dyslipidemia E78.5    Acute on chronic combined systolic and diastolic heart failure ([facility]) I50.43    Ischemic cardiomyopathy I25.5    Tobacco abuse Z72.0    CVA (cerebral vascular accident) ([facility] I63.9    History of CVA (cerebrovascular accident) Z86.73    Type 2 diabetes mellitus without complication, without long-term current use of insulin ([facility]) E11.9    ZAINAB (obstructive sleep apnea) G47.33    Pleural effusion J90    Chronic anemia D64.9    Nonrheumatic aortic valve stenosis I35.0    Mucus plugging of bronchi T17.500A    Hemodialysis-associated hypotension I95.3    ESRD (end stage renal disease) on dialysis ([facility]) N18.6, Z99.2    Hypotension due to drugs O82.1    Acute diastolic CHF (congestive heart failure) ([facility]) I50.31    Neuromuscular disorder ([facility]) G70.9    Renovascular hypertension I15.0    Mixed hyperlipidemia E78.2    Cigarette nicotine dependence in remission F17.211    Pulmonary edema J81.1    Fluid overload E87.70    Anemia of chronic disease D63.8    SOB (shortness of breath) on exertion R06.02    Steal syndrome of dialysis vascular access [facility]) T82.898A    Chronic, continuous use of opioids F11.90    Chronic bronchitis ([facility]) J42    Nasal congestion R09.81    Hypercholesteremia E78.00    Bradycardia R00.1    History of transcatheter aortic valve replacement (TAVR) Z95.2    Syncope and collapse R55    PAF (paroxysmal atrial fibrillation) ([facility]) I48.0    Bilateral leg weakness R29.898    GBS (Guillain-[place] syndrome) ([facility]) G61.0    Sinus pause I45.5    Weakness of both lower extremities R29.898    Sinus bradycardia R00.1    Ataxia R27.0    Peripheral vascular occlusive disease ([facility]) I73.9    Cellulitis of right foot L03.115    Iliac artery occlusion, right ([facility]) I74.5    Cellulitis and abscess of hand L03.119, L02.519    Type 2 diabetes mellitus with hyperglycemia ([facility]) E11.65    Acute encephalopathy G93.40    Acute hypoxemic respiratory failure ([facility]) J96.01    Acute CVA (cerebrovascular accident) ([address] I63.9    Speech problem R47.9    Urinary tract infection with hematuria N39.0, R31.9    Respiratory failure with hypoxia ([facility] J96.91    Acute respiratory failure with hypercapnia ([facility]) J96.02    Acute pulmonary edema ([facility]) J81.0    Grade II diastolic dysfunction X58.04    Shock circulatory ([facility]) R57.9    Smoker F17.200    Normocytic normochromic anemia D64.9    NSTEMI (non-ST elevated myocardial infarction) ([facility]) I21.4    SIRS (systemic inflammatory response syndrome) ([facility]) R65.10    Atrial flutter ([facility]) I48.92    [place]-rectal abscess K61.1    Somnolence R40.0    Pulmonary edema with diastolic CHF, NYHA class 3 ([facility]) I50.30    Respiratory failure ([facility]) Access:  - None    Nursing Mobility/ADLs:  Walking   Assisted  Transfer  Independent  Bathing  Independent  Dressing  Independent  Toileting  Independent  Feeding  Independent  Med Admin  Assisted  Med Delivery   [facility] MED Delivery:[id number]}    Wound Care Documentation and Therapy:  Wound 08/30/21 Toe (Comment  which one) Right Great Toe (Active)   Wound Image   07/18/22 1002   Wound Etiology Diabetic 07/18/22 1002   Dressing Status New dressing applied 07/18/22 1002   Wound Cleansed Cleansed with saline 07/18/22 1002   Dressing/Treatment Alginate with Ag;Dry dressing;Roll gauze 07/18/22 1002   Offloading for Diabetic Foot Ulcers Offloading ordered 07/18/22 1002   Dressing Change Due 07/19/22 07/18/22 1002   Wound Length (cm) 1.2 cm 07/18/22 1002   Wound Width (cm) 0.6 cm 07/18/22 1002   Wound Depth (cm) 0.1 cm 07/18/22 1002   Wound Surface Area (cm^2) 0.72 cm^2 07/18/22 1002   Change in Wound Size % (l*w) -380 07/18/22 1002   Wound Volume (cm^3) 0.072 cm^3 07/18/22 1002   Wound Healing % -380 07/18/22 1002   Wound Assessment Pink/red 07/18/22 1002   Drainage Amount Scant 07/18/22 1002   Drainage Description Serosanguinous 07/18/22 1002   Odor None 07/18/22 1002   [place]iberty-wound Assessment Blanchable erythema 07/18/22 1002   Margins Undefined edges 07/18/22 1002   Wound Thickness Description not for Pressure Injury Partial thickness 07/18/22 1002   Number of days: 321       Wound 01/12/22 Pedal Anterior;Right Healing scab from previous blister (per pt), flaky edges (Active)   Number of days: 187       Wound 01/12/22 Toe (Comment  which one) Anterior;Right Scab from old blister (per pt) on 4th toe, flaky edges (Active)   Number of days: 187       Incision 02/24/22 Perineum (Active)   Number of days: 143        Elimination:  Continence:    Bowel: Yes  Bladder: Yes  Urinary Catheter: None   Colostomy/Ileostomy/Ileal Conduit: No       Date of Last BM: 7/21/22    Intake/Output Summary (Last 24 hours) at 7/18/2022 1358  Last data filed at 7/18/2022 1239  Gross per 24 hour   Intake 1300 ml   Output --   Net 1300 ml     I/O last 3 completed shifts: In: 18 [P.O.:990]  Out: -     Safety Concerns:     History of Falls (last 30 days)    Impairments/Disabilities:      None    Nutrition Therapy:  Current Nutrition Therapy:   - Oral Diet:  General    Routes of Feeding: Oral  Liquids: No Restrictions  Daily Fluid Restriction: no  Last Modified Barium Swallow with Video (Video Swallowing Test): not done    Treatments at the Time of Hospital Discharge:   Respiratory Treatments: ***  Oxygen Therapy:  is on oxygen at 1 L/min per nasal cannula. Ventilator:    - No ventilator support    Rehab Therapies: Physical Therapy and Occupational Therapy  Weight Bearing Status/Restrictions: No weight bearing restrictions  Other Medical Equipment (for information only, NOT a DME order):  ***  Other Treatments: ***    Patient's personal belongings (please select all that are sent with patient):  ***    RN SIGNATURE:  Electronically signed by Jan Styles RN on 7/21/22 at 3:56 PM EDT    CASE MANAGEMENT/SOCIAL WORK SECTION    Inpatient Status Date: ***    Readmission Risk Assessment Score:  Readmission Risk              Risk of Unplanned Readmission:  [id number]           Discharging to Facility/ Agency   Name: [facility]   Address:  Phone: [phone]  Fax:    Dialysis Facility (if applicable)   Name:  Address:  Dialysis Schedule:  Phone:  Fax:    / signature: Electronically signed by Noelle Damian RN on 7/21/22 at 2:27 PM EDT    PHYSICIAN SECTION    Prognosis: Fair    Condition at Discharge: Stable    Rehab Potential (if transferring to Rehab):  Fair    Recommended Labs or Other Treatments After Discharge:   Recommended Follow-up, Labs or Other Treatments After Discharge:                 Physician Certification: I certify the above information and transfer of Bary Nissen  is necessary for the continuing treatment of the diagnosis listed and that he requires [place] for greater 30 days.      Update Admission H&P: No change in H&P    PHYSICIAN SIGNATURE:  Electronically signed by Mega Lopez MD on 7/21/22 at 10:41 AM EDT

## 2022-07-18 NOTE — CARE COORDINATION
CASE MANAGEMENT INITIAL ASSESSMENT      Reviewed chart and completed assessment with patient      Health Care Decision Maker :   Primary Decision Maker: Crystal Ann - Spouse - [phone]    Secondary Decision Maker: Benito Werner - Brother/Sister - [phone]          Can this person be reached and be able to respond quickly, such as within a few minutes or hours? Yes      Admit date/status: 7/17/22 Inpatient   Diagnosis: hypertensive emergency   Is this a Readmission?:  Yes      Insurance: [facility] required for SNF: Yes       3 night stay required: No    Living arrangements, Adls, care needs, prior to admission: lives in a mobile home with his wife     Durable Medical Equipment at home:  Walker_X_Cane__RTS__ BSC__Shower Chair_X_  02_X ([employer])_ HHN__ CPAP_X_  BiPap__  Hospital Bed__ W/C___ Other_grab  bars____    Services in the home and/or outpatient, prior to admission:active with [facility] care    Current PCP: Tracy Ingram MD                     Transportation needs:  TBD     Dialysis Facility (if applicable)   Name: [facility]   Address:  Dialysis Schedule: [facility]  Phone:  Fax:    PT/OT recs: none    Hospital Exemption Notification (HEN): not initiated     Barriers to discharge: none    Plan/comments: Patient is independent at home. He plans to discharge back to home with resumption of care through [facility]. Will continue to follow should any additional needs arise.      ECOC on chart for MD signature

## 2022-07-18 NOTE — PROGRESS NOTES
[facility] Progress Note           Subjective/   45y.o. year old male who we are seeing in consultation for ESKD on HD. HPI:  Last HD on 7/17 with 4 liters removed, post-weight of 107 kg. Shortness of breath persists. ROS:  Intake adequate, +weak.     Objective/   GEN:  Chronically ill, BP (!) 146/81   Pulse 62   Temp 97.8 °F (36.6 °C) (Axillary)   Resp 16   Ht 5' 9\" (1.753 m)   Wt 235 lb 14.3 oz (107 kg)   SpO2 97%   BMI 34.84 kg/m²   HEENT: non-icteric, no JVD  CV: S1, S2 without m/r/g; trace LE edema  RESP: CTA B without w/r/r; breathing wnl  ABD: +bs, soft, nt, no hsm  SKIN: warm, no rashes  ACCESS: Left [facility]    Data/  Recent Labs     07/16/22  0045 07/17/22  0208   WBC 9.6 11.9*   HGB 10.5* 10.6*   HCT 31.6* 32.5*   MCV 90.1 90.5    246     Recent Labs     07/16/22  0045 07/17/22  0208 07/18/22  0509   * 131* 132*   K 3.9 4.3 4.3   CL 90* 90* 92*   CO2 28 25 23   GLUCOSE 204* 152* 310*   PHOS  --   --  5.3*   MG  --   --  1.80   BUN 34* 48* 49*   CREATININE 5.0* 6.5* 5.5*   LABGLOM 12* 9* 11*   GFRAA 15* 11* 13*       Assessment/     -ESKD on HD Monday Wednesday Friday at [facility]               Target weight on discharge on 7/9 from hospital was 106 kg               Urgent dialysis on 7/17 with 4 L UF, post weight 107 kg    -Acute on chronic respiratory failure on BiPAP and now weaned off to nasal cannula    -Hyponatremia from volume overload    -Anemia - DAVON with HD    -CKD/MBD               Patient on Hectorol 8 mcg as outpatient    -Hypertension, poorly controlled, better with UF with HD     -History of CHF with reduced EF, EF around 20 to 25%    -Bicuspid severe aortic stenosis status post TAVR on 10/2019 along with CAD status post RCA stenting in 1/14/2022 and PATRICIA LAD on 2015      Plan/     - HD today per schedule - UF as tolerated with crit line monitoring  - Epogen, Hectorol with HD  - Trend labs, bp's    ____________________________________  Thea Resendiz MD  [facility]  [email or web address]  Office: [phone]

## 2022-07-18 NOTE — PROGRESS NOTES
[facility]   Daily Progress Note    Admit Date:  7/17/2022  HPI:    Chief Complaint   Patient presents with    Shortness of Breath     Seen multiple time for this, \"wants the fluids off is lungs\"  states \"I'm not going home you have to do something\"         Mitchell Kumari presented with worsening shortness of breath. Hx of CAD s/p PCI to LAD in 2015, RCA 1/2022, AS s/p TAVR 2019, ICM, s/d CHF, A. Fib (Eliquis), HYPERTENSION, HLD, DM, PAD s/p PTA R iliac artery, ESRD on HD, chronic anemia, ZAINAB on CPAP, COPD, and hx of CVA. Subjective:  Mr. Henry Tsang lying in bed on bipap. Reports breathing is better. Denies chest pain.      Objective:   Patient Vitals for the past 24 hrs:   BP Temp Temp src Pulse Resp SpO2 Weight   07/18/22 0414 -- -- -- -- 16 -- --   07/18/22 0053 (!) 144/72 98 °F (36.7 °C) -- 73 19 97 % --   07/18/22 0014 -- -- -- -- 17 -- --   07/17/22 1954 (!) 161/70 97.7 °F (36.5 °C) Oral 87 20 98 % --   07/17/22 1745 (!) 140/70 98.6 °F (37 °C) Oral 84 18 98 % --   07/17/22 1250 137/75 97.9 °F (36.6 °C) Oral 82 16 98 % 235 lb 14.3 oz (107 kg)   07/17/22 0941 (!) 179/89 98.5 °F (36.9 °C) -- 83 24 98 % 245 lb 13 oz (111.5 kg)   07/17/22 0845 (!) 176/80 98.4 °F (36.9 °C) Oral 83 20 98 % --       Intake/Output Summary (Last 24 hours) at 7/18/2022 0805  Last data filed at 7/18/2022 0414  Gross per 24 hour   Intake 990 ml   Output --   Net 990 ml     Wt Readings from Last 3 Encounters:   07/17/22 235 lb 14.3 oz (107 kg)   07/08/22 232 lb 12.9 oz (105.6 kg)   07/04/22 242 lb (109.8 kg)         ASSESSMENT:   HFrEF: elevated BNP (chronic), shortness of breath, fluid overload  CAD: hx PCI, on Plavix, beta blocker, statin, nitrate  ICM: EF 20-25%, on Entresto, Toprol  AS/ TAVR: 2019, stable by recent echo  AFIB: currently SR, anticoagulation with Elliquis  HYPERTENSION: sub-optimal, often comes in with hypertensive emergency, likely related to med non-compliance  ESRD: HD/ UF per nephrology  DM2  PAD  ZAINAB  Hx CVA      PLAN:  Increase Entresto to 49/51 mg bid  Continue Toprol 50 mg bid  Fluid management per HD/ nephrology  Continue Eliquis and Plavix and statin  Frequent admissions most likely 2/2 non-compliance with medical regimen. I feel he should be evaluated for facility placement as he is unable to maintain at home.      Mauri Evette, APRN - CNP, 7/18/2022, 8:05 AM  [facility]   [phone]       Telemetry: SR 50-90,  isolated PVC's  NYHA: III    Physical Exam:  General:  Awake, alert, NAD  Skin:  Warm and dry  Neck:  JVP unable to assess  Chest:  Bibasilar rales with scattered wheezes  Cardiovascular:  RRR, normal S1S2, + murmur, no g/r  Abdomen:  Soft, nontender, +bowel sounds  Extremities:  No BLE edema      Medications:    clopidogrel  75 mg Oral Daily    isosorbide dinitrate  20 mg Oral TID    metoprolol succinate  50 mg Oral BID    pantoprazole  40 mg Oral QAM AC    pravastatin  40 mg Oral Daily    tiotropium-olodaterol  2 puff Inhalation Daily    traZODone  100 mg Oral Nightly    sodium chloride flush  5-40 mL IntraVENous 2 times per day    insulin lispro  0-6 Units SubCUTAneous TID WC    insulin lispro  0-3 Units SubCUTAneous Nightly    apixaban  2.5 mg Oral BID    sacubitril-valsartan  1 tablet Oral BID      sodium chloride      dextrose         Lab Data: Lab results independently reviewed and analyzed by myself 7/18/2022    CBC:   Recent Labs     07/16/22 0045 07/17/22  0208   WBC 9.6 11.9*   HGB 10.5* 10.6*    246     BMP:    Recent Labs     07/16/22  0045 07/17/22  0208 07/18/22  0509   * 131* 132*   K 3.9 4.3 4.3   CO2 28 25 23   BUN 34* 48* 49*   CREATININE 5.0* 6.5* 5.5*     INR:    Recent Labs     07/17/22  0310   INR 1.37*     BNP:    Recent Labs     07/16/22  0045 07/17/22  0208   PROBNP >70,000* >70,000*     Cardiac Enzymes:   Recent Labs     07/17/22  0208 07/17/22  0546 07/17/22  0958   TROPONINI 0.12* 0.12* 0.10*     Lipids:   Lab Results   Component Value Date/Time    TRIG 213 03/18/2022 06:57 AM    TRIG 214 11/30/2021 04:20 AM    HDL 38 03/18/2022 06:57 AM    HDL 53 11/30/2021 04:20 AM    LDLCALC 86 03/18/2022 06:57 AM    LDLCALC 155 11/30/2021 04:20 AM    LDLDIRECT 199 09/04/2014 10:33 AM    LDLDIRECT 172 03/07/2014 09:37 AM       Cardiac Imaging:   ECHO 6/3/22:    Summary    Definity® used for myocardial border enhancement. Left ventricular systolic function is severely reduced with a visually estimated ejection fraction of 20-25 %. EF estimated by Jasmine's method at 24 %. The left ventricle is mildly dilated in size with normal wall thickness. Severe global hypokinesis with regional variation. Grade I diastolic dysfunction with normal LV pressure. There is no evidence of a left ventricular thrombus. Right ventricular systolic function is reduced. A 29 mm Langford Leyda S3 bioprosthetic aortic valve appears well seated with normal Doppler values. Inadequate tricuspid valve regurgitation to estimate systolic pulmonary artery pressure. There is a moderate left pleural effusion noted. [facility] [place] nuclear stress test 6/2/2022:   Severe LV dysfunction EF 26%    Global hypokinesis with regional variation, more pronounced in inferolateral    wall and apex    Large mainly fixed defect in inferior wall, extends to portions of lateral    wall and apex with moderate darrick-infarct ischemia        Overall, this would be considered an abnormal, higher risk, study          Coronary angiogram 1/14/2022:  1. Left heart catheterization  2. Selective left and right coronary angiogram  3. PCI of the RCA with PATRICIA  Procedure Findings:  1. 99% mid RCA  2. 60-70% CIRC and DIAG  3. Elevated left heart hemodynamics              ~LVEDP 30       Details:              Alpesh Bush was brought to the cardiac catheterization lab in a fasting state after informed consent was obtained.  If the patient was able to provide written consent, it was obtained. The patient's vitals were monitored through out the procedure. The patient was sedated using the appropriate levels of sedation and ASA guidelines. The appropriate access site area was prepped and drapped in a sterile fashion. The area was anesthetized with 2% lidocaine. Using the modified Seldinger technique, an arterial sheath was introduced into the arterial access site using a single anterior wall puncture. The sheath was flushed and prepped in usual fashion. Catheters used during the procedure included 5 Vietnamese TIG 4.0 catheter. The catheters were advanced and removed over a .035\" wire, into the appropriate positions. Multiple angiographic views were obtained. An LV gram was not obtained. ~The interventional portion of the procedure was completed utilizing a multipurpose 6 [place] guide. Multipurpose guide was advanced into the right coronary ostium. A gentleman therapy aspirin, Plavix, Angiomax was initiated. A BMW wire was advanced into the distal right coronary artery. The lesion was initially predilated with a 2.75 x 15 mm Euphora balloon followed by a 3.5 mm x 20 mm Euphora balloon and subsequently stented with a resolute [place] 4.0 mm x 38 mm balloon. We did experience a no reflow phenomenon. We separately done. A twin pass catheter and then did local drug delivery with 200 mcg of nitroprusside and 200 mcgnitroglycerin. Provided restoration of SCARLETT-3 flow. Findings:  1. Left main coronary artery was normal. It gave off the left anterior descending artery and left circumflex. 2. Left anterior descending artery has 60% severe atherosclerotic disease. It was moderate in size. It gave off septal perforators and a moderate sized diagonal branch. The LAD covered the entire apex of the left ventricle. 3. Left circumflex has 60% severe atherosclerotic disease. It was moderate in size.  There was a moderate sized obtuse marginal branch. 4. Right coronary artery has 99% severe atherosclerotic disease. It was moderate in size and was the dominant artery. CONCLUSIONS:  1. Apercutaneous coronary intervention of the right coronary artery utilizing a single resolute [place] drug-eluting stent  ASSESSMENT/RECOMMENDATIONS:  1. Dual antiplatelet therapy  2. Medical management of the remaining coronary disease     Echo 1/2022:   Limited only f/u for LVEF and RVF. The left ventricular systolic function is mildly reduced with an ejection   fraction of 40-45 %. There is hypokinesis of the apex and inferior walls. There is a very small circumferential pericardial effusion noted. No tamponade physiology. The right ventricle is normal in size and function. Echo 7/9/2020:  Low Normal systolic function with an estimated ejection fraction of 50%. Left ventricular function and wall motion is difficult to estimate due to   poor endocardial visualization. Grade I diastolic dysfunction with normal filing pressure. Normally functioning TAVR 29 mm Langford Leyda S3 bioprosthetic valve in   aortic position appears well seated with a max velocity of 2.11 m/sec,   maximum gradient of 18 mmHg and a mean gradient of 8 mmHg. There is no evidence of aortic regurgitation    Coronary angiogram 8/22/2019:  LM <20%  LAD patent stent  Cx <20%  RCA 30%  Severe AS by echo  Proceed w/ TAVR    Echo 1/24/2017:  Summary   Left ventricular systolic function is normal with an ejection fraction of   55-60%. No wall motion abnormalities noted. Mild concentric left ventricular hypertrophy. Grade II diastolic dysfunction with elevated filling pressure. Mildly dilated left atrium. Moderate aortic stenosis. Mild aortic regurgitation. Compared to previous study from 10/27/2016, aortic stenosis has not   progressed.         R/LHC 1/23/2017:  LM <20%  LAD 30% w/ patent stent  Cx 20-30%  RCA 20-30%  LVEDP 19  RA 8, RV 41/10, PA 45/14/31, W 15 Nonobstructive CAD  Mild elevated right heart pressures        Echo Oct 2016:   Normal left ventricle size with mild concentric left ventricular hypertrophy. Normal systolic function with an estimated ejection fraction of 55%. No regional wall motion abnormalities are seen. Elevated left ventricular diastolic filling pressure ( E/e' 21 ). The aortic valve is thickened/calcified with decreased leaflet mobility. Cannot exclude a bicuspid valve. The aortic valve area is calculated at 1.0 cm2 with a max velocity of 3.36 m/sec, maximum pressure gradient of 45 mmHg and a mean pressure gradient of 23 mmHg. This is c/w moderate aortic stenosis. Color flow demonstrates eccentric jet suggesting mild aortic regurgitation. Trace mitral and tricuspid regurgitation. Systolic pulmonary artery pressure (SPAP) is normal and estimated at 22 mmHg (RA pressure 3 mmHg). There is mild concentric left ventricular hypertrophy. MPI 6/18/15: There is a very small and mild fixed posterior-basal defect consistent with  fibrosis. There is no evidence for ischemia. Left ventricular function is reduced with and estimated LVEF of 40%. The LV is severely dilated. CATH: 5/26/15, Dr. Elizabeth Smith  LM <20%   LAD 70% mid.  FFR 0.77  D1 50% prox  Cx 20%  RCA 20%  LVEDP 22mmHg  RA 9, RV 42/10, PA 44/16/31, W 18  PA sat 63%    PTCA LAD  3.0 x 15 PATRICIA  prox portion post 3.5 x 8 NC balloon    DAPT, statin  Resume lasix and ARB at discharge provided Cr stable  Renal fxn will not tolerate higher dose of lasix than 40 daily  Needs smoking cessation, weight loss, exercise  Rec OP sleep eval

## 2022-07-18 NOTE — PROGRESS NOTES
[facility] Wound Ostomy Continence Nurse  Consult Note       NAME:  Denae Raphael RECORD NUMBER:  [id number]  AGE: 45 y.o. GENDER: male  : 1970  TODAY'S DATE:  2022    Subjective; raised head and acknowledged Wound Care, laid back down. Reason for WOCN Evaluation and Assessment: Right large toe around nail. Alpesh Bush is a 45 y.o. male referred by:   [] Physician  [x] Nursing  [] Other:       Wound Identification: Diabetic skin tear to bi lateral large toe right foot. Wound Type: diabetic  Contributing Factors: edema, diabetes, poor glucose control, chronic pressure, and decreased tissue oxygenation  HTN, CHF    Wound History: 76-year-old male with significant past medical history of hypertension, hyperlipidemia, type 2 diabetes, ESRD on HD, congestive heart failure, and ischemic cardiomyopathy who presents to [facility] emergency department with complaint of shortness of breath and having fluid on his lungs. He is here very frequently for similar concerns with suspicion of nonadherence to his medical treatment plan for hypertension and ESRD. He is on dialysis  and , he states that he went to dialysis this past Friday.     Current Wound Care Treatment:  Regular bandaid on toe    Patient Goal of Care:  [x] Wound Healing  [] Odor Control  [] Palliative Care  [x] Pain Control   [] Other:         PAST MEDICAL HISTORY        Diagnosis Date    Ambulatory dysfunction     walker for long distances, SOB with distance    Aortic stenosis     echo 2017    Arthritis     hands and hips    Asthma     Bilateral hilar adenopathy syndrome 6/3/2013    CAD (coronary artery disease)     Dr. Ashley Sanon [facility]) 2019    EF= 43%    CHF (congestive heart failure) ([facility] [address]     Chronic pain     COPD (chronic obstructive pulmonary disease) [facility])     pulmonology Dr. Carlton Jackson    Depression     Diabetes mellitus ([facility] [address]     borderline quittin.2    Smokeless tobacco: Never    Tobacco comments:     States quit 2021   Vaping Use    Vaping Use: Never used   Substance Use Topics    Alcohol use: Not Currently     Alcohol/week: 0.0 standard drinks     Comment: occ    Drug use: No       ALLERGIES    Allergies   Allergen Reactions    Morphine Nausea And Vomiting       MEDICATIONS    No current facility-administered medications on file prior to encounter. Current Outpatient Medications on File Prior to Encounter   Medication Sig Dispense Refill    traZODone (DESYREL) 100 MG tablet Take 100 mg by mouth nightly      oxyCODONE-acetaminophen (PERCOCET) 7.5-325 MG per tablet Take 1 tablet by mouth every 6 hours as needed (pain) for up to 30 days.  120 tablet 0    QUEtiapine (SEROQUEL) 50 MG tablet TAKE 1 TABLET BY MOUTH EVERY EVENING 30 tablet 10    isosorbide dinitrate (ISORDIL) 20 MG tablet Take 1 tablet by mouth 3 times daily 90 tablet 3    clopidogrel (PLAVIX) 75 MG tablet Take 1 tablet by mouth daily 90 tablet 3    cyclobenzaprine (FLEXERIL) 10 MG tablet TAKE 1 TABLET BY MOUTH EVERY 8 HOURS AS NEEDED 90 tablet 10    pantoprazole (PROTONIX) 40 MG tablet TAKE 1 TABLET BY MOUTH EVERY MORNING BEFORE BREAKFAST 30 tablet 10    docusate sodium (DOK) 100 MG capsule Take 1 capsule by mouth daily 30 capsule 5    LINZESS 145 MCG capsule TAKE 1 CAPSULE BY MOUTH IN THE MORNING BEFORE BREAKFAST 30 capsule 10    DULoxetine (CYMBALTA) 30 MG extended release capsule TAKE 1 CAPSULE BY MOUTH EVERY DAY 30 capsule 10    mineral oil liquid Take 30 mLs by mouth daily as needed for Constipation 300 mL 0    sennosides-docusate sodium (SENOKOT-S) 8.6-50 MG tablet Take 1 tablet by mouth daily 10 tablet 0    metoprolol succinate (TOPROL XL) 50 MG extended release tablet Take 1 tablet by mouth in the morning and at bedtime 60 tablet 1    apixaban (ELIQUIS) 5 MG TABS tablet Take 1 tablet by mouth 2 times daily 60 tablet 1    pravastatin (PRAVACHOL) 40 MG tablet Take 1 tablet by mouth daily 90 tablet 3    Continuous Blood Gluc Sensor (DEXCOM G6 SENSOR) MISC Every 10 days 9 each 3    Continuous Blood Gluc Transmit (DEXCOM G6 TRANSMITTER) MISC 1 each by Does not apply route every 3 months 1 each 3    Continuous Blood Gluc  (DEXCOM G6 ) ADAM 1 each by Does not apply route Daily with lunch 1 each 0    B Complex-C-Folic Acid (VIRT-CAPS) 1 MG CAPS TAKE 1 CAPSULE BY MOUTH EVERY DAY 90 capsule 1    Calcium Acetate, Phos Binder, 667 MG CAPS TAKE 1 CAPSULE BY MOUTH THREE TIMES DAILY WITH MEALS 90 capsule 3    nitroGLYCERIN (NITROSTAT) 0.4 MG SL tablet DISSOLVE 1 TABLET UNDER THE TONGUE AS NEEDED FOR CHEST PAIN EVERY 5 MINUTES UP TO 3 TIMES. IF NO RELIEF CALL 911. 25 tablet 10    vitamin D (ERGOCALCIFEROL) 57914 units CAPS capsule TK 1 C PO WEEKLY  11    Tiotropium Bromide-Olodaterol (STIOLTO RESPIMAT) 2.5-2.5 MCG/ACT AERS Inhale 2 puffs into the lungs daily 2 Inhaler 0    Blood Glucose Monitoring Suppl ADAM USE AS DIRECTED. 1 Device 0    Alcohol Swabs PADS USE AS DIRECTED 300 each 3    albuterol sulfate  (90 Base) MCG/ACT inhaler Inhale 2 puffs into the lungs every 6 hours as needed for Wheezing 1 Inhaler 3    ipratropium-albuterol (DUONEB) 0.5-2.5 (3) MG/3ML SOLN nebulizer solution Inhale 3 mLs into the lungs every 6 hours as needed for Shortness of Breath 360 mL 1    calcium carbonate (TUMS) 500 MG chewable tablet Take 1 tablet by mouth 3 times daily as needed for Heartburn. Objective; lying in bed on left side.     BP (!) 146/81   Pulse 62   Temp 97.8 °F (36.6 °C) (Axillary)   Resp 16   Ht 5' 9\" (1.753 m)   Wt 235 lb 14.3 oz (107 kg)   SpO2 97%   BMI 34.84 kg/m²     LABS:  WBC:    Lab Results   Component Value Date/Time    WBC 11.9 07/17/2022 02:08 AM     H/H:    Lab Results   Component Value Date/Time    HGB 10.6 07/17/2022 02:08 AM    HCT 32.5 07/17/2022 02:08 AM     PTT:    Lab Results   Component Value Date/Time    APTT 33.1 07/17/2022 03:10 AM [APTT}  PT/INR:    Lab Results   Component Value Date/Time    PROTIME 16.7 07/17/2022 03:10 AM    INR 1.37 07/17/2022 03:10 AM     HgBA1c:    Lab Results   Component Value Date/Time    LABA1C 6.2 06/21/2022 02:16 AM       Assessment; right large toe skin tear lateral side of nail and scant ozzing from medical toe nail.    Isaac Risk Score:      Patient Active Problem List   Diagnosis    Sepsis without acute organ dysfunction ([facility])    CHF (congestive heart failure) ([facility])    Asthma-COPD overlap syndrome ([facility])    Coronary artery disease of native artery of native heart with stable angina pectoris ([facility])    PVD (peripheral vascular disease) ([facility] [address]    Bicuspid aortic valve    Bilateral hilar adenopathy syndrome    Claudication in peripheral vascular disease ([facility])    HTN (hypertension), benign    Diabetic neuropathy ([facility])    Type 2 diabetes, uncontrolled, with neuropathy ([facility])    Passive smoke exposure    Depression with anxiety    PNA (pneumonia)    Obesity (BMI 30-39.9)    ZAINAB on CPAP    Degeneration of lumbar or lumbosacral intervertebral disc    Lumbar radiculopathy    Lumbosacral spondylosis without myelopathy    Biliary colic    Symptomatic cholelithiasis    Gastroparesis due to DM ([facility])    Angina, class IV ([facility])    Dyspnea    Dyslipidemia    Acute on chronic combined systolic and diastolic heart failure ([facility])    Ischemic cardiomyopathy    Tobacco abuse    CVA (cerebral vascular accident) ([facility] [address]    History of CVA (cerebrovascular accident)    Type 2 diabetes mellitus without complication, without long-term current use of insulin ([facility])    ZAINAB (obstructive sleep apnea)    Pleural effusion    Chronic anemia    Nonrheumatic aortic valve stenosis    Mucus plugging of bronchi    Hemodialysis-associated hypotension    ESRD (end stage renal disease) on dialysis ([facility])    Hypotension due to drugs    Acute diastolic CHF (congestive heart failure) ([facility])    Neuromuscular disorder ([facility])    Renovascular hypertension    Mixed hyperlipidemia    Cigarette nicotine dependence in remission    Pulmonary edema    Fluid overload    Anemia of chronic disease    SOB (shortness of breath) on exertion    Steal syndrome of dialysis vascular access ([facility])    Chronic, continuous use of opioids    Chronic bronchitis ([facility])    Nasal congestion    Hypercholesteremia    Bradycardia    History of transcatheter aortic valve replacement (TAVR)    Syncope and collapse    PAF (paroxysmal atrial fibrillation) ([facility])    Bilateral leg weakness    GBS (Guillain-Rexford syndrome) ([facility])    Sinus pause    Weakness of both lower extremities    Sinus bradycardia    Ataxia    Peripheral vascular occlusive disease ([facility])    Cellulitis of right foot    Iliac artery occlusion, 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[facility])    Cellulitis and abscess of hand    Type 2 diabetes mellitus with hyperglycemia ([facility])    Acute encephalopathy    Acute hypoxemic respiratory failure ([facility])    Acute CVA (cerebrovascular accident) ([address]    Speech problem    Urinary tract infection with hematuria    Respiratory failure with hypoxia ([address]    Acute respiratory failure with hypercapnia ([facility])    Acute pulmonary edema ([facility])    Grade II diastolic dysfunction    Shock circulatory ([facility])    Smoker    Normocytic normochromic anemia    NSTEMI (non-ST elevated myocardial infarction) ([address]    SIRS (systemic inflammatory response syndrome) ([facility])    Atrial flutter ([facility])    Liberty-rectal abscess    Somnolence    Pulmonary edema with diastolic CHF, NYHA class 3 ([address]    Respiratory failure ([facility])    Former smoker    Cardiomyopathy ([address]    Morbid obesity with BMI of 40.0-44.9, adult ([address]    Hypoglycemia    Altered mental status    Hypertensive emergency    SOB (shortness of breath)       Measurements:  Wound 08/30/21 Toe (Comment  which one) Right Great Toe (Active)   Wound Image   07/18/22 1002   Wound Etiology Diabetic 07/18/22 1002   Dressing Status New dressing applied 07/18/22 1002   Wound Cleansed Cleansed with saline 07/18/22 1002 Dressing/Treatment Alginate with Ag;Dry dressing;Roll gauze 07/18/22 1002   Offloading for Diabetic Foot Ulcers Offloading ordered 07/18/22 1002   Dressing Change Due 07/19/22 07/18/22 1002   Wound Length (cm) 1.2 cm 07/18/22 1002   Wound Width (cm) 0.6 cm 07/18/22 1002   Wound Depth (cm) 0.1 cm 07/18/22 1002   Wound Surface Area (cm^2) 0.72 cm^2 07/18/22 1002   Change in Wound Size % (l*w) -380 07/18/22 1002   Wound Volume (cm^3) 0.072 cm^3 07/18/22 1002   Wound Healing % -380 07/18/22 1002   Wound Assessment Pink/red 07/18/22 1002   Drainage Amount Scant 07/18/22 1002   Drainage Description Serosanguinous 07/18/22 1002   Odor None 07/18/22 1002   Liberty-wound Assessment Blanchable erythema 07/18/22 1002   Margins Undefined edges 07/18/22 1002   Wound Thickness Description not for Pressure Injury Partial thickness 07/18/22 1002   Number of days: 321             Response to treatment:  Well tolerated by patient. Pain Assessment:  Severity:  0 / 10  Quality of pain: N/A  Wound Pain Timing/Severity: none  Premedicated: No    Plan   Plan of Care: Wound 08/30/21 Toe (Comment  which one) Right Great Toe-Dressing/Treatment: Alginate with Ag, Dry dressing, Roll gauze    Daily  Cleanse right large toe nail surrounding tissue with normal saline, pat dry. Apply Alginate Ag to both sides of large toe, wrap with guaze, roll guaze. Change daily. Call wound care for deterioration [phone] or call [phone] and leave message. [facility] bedside nurse updated on treatment and request of Podiatery to check patient's toe related to redness/infection. Specialty Bed Required : Yes   [x] Low Air Loss   [x] Pressure Redistribution  [] Fluid Immersion  [] Bariatric  [] Total Pressure Relief  [] Other:     Current Diet: ADULT DIET;  Regular; Low Potassium (Less than 3000 mg/day)  Dietician consult:  Yes    Discharge Plan:  Placement for patient upon discharge: home with support    Patient appropriate for Outpatient Wound Care Center: Yes    Referrals:  [x]  / discharge planner following  [] [facility]  [] Supplies  [] Other    Patient/Caregiver Teaching: Instructed patient to keep feet elevated. No response from patient.   Level of patient/caregiver understanding able to:   [] Indicates understanding       [] Needs reinforcement  [] Unsuccessful      [] Verbal Understanding  [] Demonstrated understanding       [x] No evidence of learning  [] Refused teaching         [] N/A       Electronically signed by Miko Xavier, RN, BSN on 7/18/2022 at 10:05 AM

## 2022-07-18 NOTE — FLOWSHEET NOTE
07/18/22 1503 07/18/22 1817   Vital Signs   BP (!) 147/64 138/60   Temp 97.6 °F (36.4 °C) 98.5 °F (36.9 °C)   Heart Rate 60 64   Weight 240 lb 1.3 oz (108.9 kg) 233 lb 0.4 oz (105.7 kg)   Weight Method Bed scale Bed scale     Treatment time: 180min    Net UF: 3000ml    Pre weight: 108.9k  Post weight: 105.7k  EDW: 109.5k    Access used: [facility] TDC  Access function: Good    Medications or blood products given: Retacrit 5000units , Hectorol 8mcg IV    Regular outpatient schedule: MWF    Summary of response to treatment: Tolerated well. Copy of dialysis treatment record placed in chart, to be scanned into EMR.

## 2022-07-18 NOTE — PROGRESS NOTES
07/18/22 0414   NIV Type   Equipment Type V60   Mode CPAP   Mask Type Full face mask   Mask Size Large   Settings/Measurements   CPAP/EPAP 10 cmH2O   Resp 16   FiO2  30 %   Vt Exhaled 411 mL   Minute Volume 8.2 Liters   Mask Leak (lpm) 51 lpm   Comfort Level Good   Using Accessory Muscles No   SpO2 97   Patient's Home Machine No   Breath Sounds   Right Upper Lobe Diminished   Right Middle Lobe Diminished   Right Lower Lobe Diminished   Left Upper Lobe Diminished   Left Lower Lobe Diminished   Alarm Settings   Alarms On Y

## 2022-07-18 NOTE — PROGRESS NOTES
Hospitalist Progress Note      PCP: Shanta Cohen MD    Date of Admission: 7/17/2022        Subjective:     Reports slight improvement but still short of breath. .  Scheduled for hemodialysis afternoon      Medications:  Reviewed    Infusion Medications    sodium chloride      dextrose       Scheduled Medications    clopidogrel  75 mg Oral Daily    isosorbide dinitrate  20 mg Oral TID    metoprolol succinate  50 mg Oral BID    pantoprazole  40 mg Oral QAM AC    pravastatin  40 mg Oral Daily    tiotropium-olodaterol  2 puff Inhalation Daily    traZODone  100 mg Oral Nightly    sodium chloride flush  5-40 mL IntraVENous 2 times per day    insulin lispro  0-6 Units SubCUTAneous TID WC    insulin lispro  0-3 Units SubCUTAneous Nightly    apixaban  2.5 mg Oral BID    sacubitril-valsartan  1 tablet Oral BID     PRN Meds: albuterol sulfate HFA, cyclobenzaprine, ipratropium-albuterol, oxyCODONE-acetaminophen, sodium chloride flush, sodium chloride, acetaminophen **OR** acetaminophen, prochlorperazine, hydrALAZINE, glucose, dextrose bolus **OR** dextrose bolus, glucagon (rDNA), dextrose, labetalol, heparin (porcine)      Intake/Output Summary (Last 24 hours) at 7/18/2022 1015  Last data filed at 7/18/2022 0847  Gross per 24 hour   Intake 1000 ml   Output --   Net 1000 ml       Physical Exam Performed:    BP (!) 146/81   Pulse 62   Temp 97.8 °F (36.6 °C) (Axillary)   Resp 16   Ht 5' 9\" (1.753 m)   Wt 235 lb 14.3 oz (107 kg)   SpO2 97%   BMI 34.84 kg/m²     General appearance: No apparent distress, appears stated age and cooperative. HEENT: Pupils equal, round, and reactive to light. Conjunctivae/corneas clear. Neck: Supple, with full range of motion. No jugular venous distention. Trachea midline. Respiratory:  Normal respiratory effort. Clear to auscultation, bilaterally without Rales/Wheezes/Rhonchi. Cardiovascular: Regular rate and rhythm with normal S1/S2 without murmurs, rubs or gallops.   Abdomen: Soft, non-tender, non-distended with normal bowel sounds. Musculoskeletal: No clubbing, cyanosis or edema bilaterally. Full range of motion without deformity. Skin: Skin color, texture, turgor normal.  No rashes or lesions. Neurologic:  Neurovascularly intact without any focal sensory/motor deficits. Cranial nerves: II-XII intact, grossly non-focal.  Psychiatric: Alert and oriented, thought content appropriate, normal insight  Capillary Refill: Brisk,3 seconds, normal   Peripheral Pulses: +2 palpable, equal bilaterally       Labs:   Recent Labs     07/16/22 0045 07/17/22  0208   WBC 9.6 11.9*   HGB 10.5* 10.6*   HCT 31.6* 32.5*    246     Recent Labs     07/16/22 0045 07/17/22 0208 07/18/22  0509   * 131* 132*   K 3.9 4.3 4.3   CL 90* 90* 92*   CO2 28 25 23   BUN 34* 48* 49*   CREATININE 5.0* 6.5* 5.5*   CALCIUM 8.8 9.0 9.0   PHOS  --   --  5.3*     Recent Labs     07/16/22  0045 07/17/22  0208   AST 11* 16   ALT 8* 8*   BILITOT <0.2 0.3   ALKPHOS 94 95     Recent Labs     07/17/22  0310   INR 1.37*     Recent Labs     07/17/22  0208 07/17/22  0546 07/17/22  0958   TROPONINI 0.12* 0.12* 0.10*       Urinalysis:      Lab Results   Component Value Date/Time    NITRU Negative 05/29/2022 12:51 PM    WBCUA 10-20 05/29/2022 12:51 PM    BACTERIA 3+ 05/29/2022 12:51 PM    RBCUA 5-10 05/29/2022 12:51 PM    BLOODU TRACE-INTACT 05/29/2022 12:51 PM    SPECGRAV 1.015 05/29/2022 12:51 PM    GLUCOSEU 100 05/29/2022 12:51 PM       Radiology:  XR CHEST PORTABLE   Final Result   Left basilar opacification and pleural effusion are redemonstrated. Pulmonary vascular congestion with interstitial and perihilar opacities   suggesting edema has mildly increased. Assessment/Plan:    -Acute on chronic systolic heart failure, EF 20 to 25%--continue volume removal with hemodialysis, beta-blocker, nitrate, Entresto. .  Follow-up with cardiology  -ESRD on hemodialysis with volume overload--continue hemodialysis per nephrology  -. Moderate left-sided pleural effusion with above--no intervention per pulmonology--continue volume of hemodialysis  -Acute respiratory failure possibly due to above--on 5 L nasal cannula--wean as tolerated  -Hypertensive emergency presentation--/110 with decompensated heart failure. ... Continue current BP regimen-defer to nephrology and cardiology  -History of severe AS s/p TAVR 2019  -Paroxysmal atrial fibrillation-in sinus rhythm--continue Eliquis, metoprolol  -Obstructive sleep apnea --continue CPAP at bedtime  -Peripheral vascular disease s/p right iliac angioplasty  -Chronic anemia DVT ESRD--continue to monitor H&H  -Right great toe wound--seen by wound team who is recommending podiatry eval  -Hyponatremia--hypervolemic--mild. Continue with hemodialysis and monitor      DVT Prophylaxis: Eliquis  Diet: ADULT DIET;  Regular; Low Potassium (Less than 3000 mg/day)  Code Status: Full Code    PT/OT Eval Status:         Teresa Underwood MD

## 2022-07-18 NOTE — PROGRESS NOTES
Nutrition Education    Consult received for CHF diet education. Provided pt with written instruction on HF nutrition therapy. Pt declined need for verbal review. Handout discusses low sodium diet, daily weights, and fluid restriction. Pt had no questions. Educated on CHF diet  Learners: Patient  Readiness: Acceptance  Method: Handout  Response: Verbalizes Understanding  Contact name and number provided.     Yohan Melo RD, LD  Contact Number: [phone]

## 2022-07-18 NOTE — PROGRESS NOTES
07/18/22 0852   NIV Type   Equipment Type v60   Mode CPAP   Mask Type Full face mask   Mask Size Large   Settings/Measurements   CPAP/EPAP 10 cmH2O   Resp 16   FiO2  30 %   Vt Exhaled 376 mL   Minute Volume 6.7 Liters   Comfort Level Good   Using Accessory Muscles No   SpO2 95   Patient's Home Machine No   Alarm Settings   Alarms On Y

## 2022-07-18 NOTE — ACP (ADVANCE CARE PLANNING)
Advance Care Planning     Advance Care Planning Activator (Inpatient)  Conversation Note      Date of ACP Conversation: 7/18/2022     Conversation Conducted with: Patient with Decision Making Capacity    ACP Activator: John Kunz RN      Health Care Decision Maker:     Current Designated Health Care Decision Maker:     Primary Decision Maker: Crystal Ann - Spouse - [phone]    Secondary Decision Maker: Jaky Sims - Brother/Sister - [phone]      Care Preferences    Ventilation: \"If you were in your present state of health and suddenly became very ill and were unable to breathe on your own, what would your preference be about the use of a ventilator (breathing machine) if it were available to you? \"      Would the patient desire the use of ventilator (breathing machine)?: yes    \"If your health worsens and it becomes clear that your chance of recovery is unlikely, what would your preference be about the use of a ventilator (breathing machine) if it were available to you? \"     Would the patient desire the use of ventilator (breathing machine)?: Yes      Resuscitation  \"CPR works best to restart the heart when there is a sudden event, like a heart attack, in someone who is otherwise healthy. Unfortunately, CPR does not typically restart the heart for people who have serious health conditions or who are very sick. \"    \"In the event your heart stopped as a result of an underlying serious health condition, would you want attempts to be made to restart your heart (answer \"yes\" for attempt to resuscitate) or would you prefer a natural death (answer \"no\" for do not attempt to resuscitate)? \" yes       [x] Yes   [] No   Educated Patient / Mortimer Hoar regarding differences between Advance Directives and portable DNR orders.     Length of ACP Conversation in minutes:  5    Conversation Outcomes:  [x] ACP discussion completed  [] Existing advance directive reviewed with patient; no changes to patient's previously recorded wishes  [] New Advance Directive completed  [] Portable Do Not Rescitate prepared for Provider review and signature  [] POLST/POST/MOLST/MOST prepared for Provider review and signature      Follow-up plan:    [] Schedule follow-up conversation to continue planning  [x] Referred individual to Provider for additional questions/concerns   [] Advised patient/agent/surrogate to review completed ACP document and update if needed with changes in condition, patient preferences or care setting    [] This note routed to one or more involved healthcare providers

## 2022-07-18 NOTE — PROGRESS NOTES
07/18/22 0014   NIV Type   Skin Assessment Clean, dry, & intact   Skin Protection for O2 Device Yes   NIV Started/Stopped On   Equipment Type V60   Mode CPAP   Mask Type Full face mask   Mask Size Large   Settings/Measurements   CPAP/EPAP 10 cmH2O   Resp 17   FiO2  30 %   Vt Exhaled 542 mL   Minute Volume 9 Liters   Mask Leak (lpm) 48 lpm   Comfort Level Good   Using Accessory Muscles No   SpO2 98   Patient's Home Machine No   Breath Sounds   Right Upper Lobe Diminished   Right Middle Lobe Diminished   Right Lower Lobe Diminished   Left Upper Lobe Diminished   Left Lower Lobe Diminished   Alarm Settings   Alarms On Y

## 2022-07-19 LAB
ALBUMIN SERPL-MCNC: 3.7 G/DL (ref 3.4–5)
ANION GAP SERPL CALCULATED.3IONS-SCNC: 15 MMOL/L (ref 3–16)
BUN BLDV-MCNC: 43 MG/DL (ref 7–20)
CALCIUM SERPL-MCNC: 8.5 MG/DL (ref 8.3–10.6)
CHLORIDE BLD-SCNC: 93 MMOL/L (ref 99–110)
CO2: 25 MMOL/L (ref 21–32)
CREAT SERPL-MCNC: 4.3 MG/DL (ref 0.9–1.3)
EKG ATRIAL RATE: 72 BPM
EKG DIAGNOSIS: NORMAL
EKG P AXIS: 12 DEGREES
EKG P-R INTERVAL: 168 MS
EKG Q-T INTERVAL: 418 MS
EKG QRS DURATION: 92 MS
EKG QTC CALCULATION (BAZETT): 457 MS
EKG R AXIS: -9 DEGREES
EKG T AXIS: 142 DEGREES
EKG VENTRICULAR RATE: 72 BPM
GFR AFRICAN AMERICAN: 17
GFR NON-AFRICAN AMERICAN: 14
GLUCOSE BLD-MCNC: 169 MG/DL (ref 70–99)
GLUCOSE BLD-MCNC: 180 MG/DL (ref 70–99)
GLUCOSE BLD-MCNC: 209 MG/DL (ref 70–99)
GLUCOSE BLD-MCNC: 211 MG/DL (ref 70–99)
GLUCOSE BLD-MCNC: 303 MG/DL (ref 70–99)
HCT VFR BLD CALC: 29.2 % (ref 40.5–52.5)
HEMOGLOBIN: 9.8 G/DL (ref 13.5–17.5)
MAGNESIUM: 1.7 MG/DL (ref 1.8–2.4)
MCH RBC QN AUTO: 29.5 PG (ref 26–34)
MCHC RBC AUTO-ENTMCNC: 33.5 G/DL (ref 31–36)
MCV RBC AUTO: 88.3 FL (ref 80–100)
PDW BLD-RTO: 16.2 % (ref 12.4–15.4)
PERFORMED ON: ABNORMAL
PHOSPHORUS: 4.7 MG/DL (ref 2.5–4.9)
PLATELET # BLD: 241 K/UL (ref 135–450)
PMV BLD AUTO: 8.1 FL (ref 5–10.5)
POTASSIUM SERPL-SCNC: 4.1 MMOL/L (ref 3.5–5.1)
RBC # BLD: 3.31 M/UL (ref 4.2–5.9)
SODIUM BLD-SCNC: 133 MMOL/L (ref 136–145)
WBC # BLD: 9.6 K/UL (ref 4–11)

## 2022-07-19 PROCEDURE — 97530 THERAPEUTIC ACTIVITIES: CPT

## 2022-07-19 PROCEDURE — 6370000000 HC RX 637 (ALT 250 FOR IP): Performed by: NURSE PRACTITIONER

## 2022-07-19 PROCEDURE — 85027 COMPLETE CBC AUTOMATED: CPT

## 2022-07-19 PROCEDURE — 93010 ELECTROCARDIOGRAM REPORT: CPT | Performed by: INTERNAL MEDICINE

## 2022-07-19 PROCEDURE — 94640 AIRWAY INHALATION TREATMENT: CPT

## 2022-07-19 PROCEDURE — 2700000000 HC OXYGEN THERAPY PER DAY

## 2022-07-19 PROCEDURE — 94660 CPAP INITIATION&MGMT: CPT

## 2022-07-19 PROCEDURE — 1200000000 HC SEMI PRIVATE

## 2022-07-19 PROCEDURE — 94761 N-INVAS EAR/PLS OXIMETRY MLT: CPT

## 2022-07-19 PROCEDURE — 6370000000 HC RX 637 (ALT 250 FOR IP): Performed by: INTERNAL MEDICINE

## 2022-07-19 PROCEDURE — 97162 PT EVAL MOD COMPLEX 30 MIN: CPT

## 2022-07-19 PROCEDURE — 99232 SBSQ HOSP IP/OBS MODERATE 35: CPT | Performed by: NURSE PRACTITIONER

## 2022-07-19 PROCEDURE — 80069 RENAL FUNCTION PANEL: CPT

## 2022-07-19 PROCEDURE — 6360000002 HC RX W HCPCS: Performed by: NURSE PRACTITIONER

## 2022-07-19 PROCEDURE — 36415 COLL VENOUS BLD VENIPUNCTURE: CPT

## 2022-07-19 PROCEDURE — 2580000003 HC RX 258: Performed by: NURSE PRACTITIONER

## 2022-07-19 PROCEDURE — 6370000000 HC RX 637 (ALT 250 FOR IP): Performed by: PODIATRIST

## 2022-07-19 PROCEDURE — 97165 OT EVAL LOW COMPLEX 30 MIN: CPT

## 2022-07-19 PROCEDURE — 83735 ASSAY OF MAGNESIUM: CPT

## 2022-07-19 RX ORDER — ONDANSETRON 2 MG/ML
4 INJECTION INTRAMUSCULAR; INTRAVENOUS EVERY 6 HOURS PRN
Status: DISCONTINUED | OUTPATIENT
Start: 2022-07-19 | End: 2022-07-21 | Stop reason: HOSPADM

## 2022-07-19 RX ADMIN — SACUBITRIL AND VALSARTAN 2 TABLET: 24; 26 TABLET, FILM COATED ORAL at 09:40

## 2022-07-19 RX ADMIN — ISOSORBIDE DINITRATE 20 MG: 20 TABLET ORAL at 16:33

## 2022-07-19 RX ADMIN — Medication 10 ML: at 09:42

## 2022-07-19 RX ADMIN — PROCHLORPERAZINE EDISYLATE 10 MG: 5 INJECTION INTRAMUSCULAR; INTRAVENOUS at 11:37

## 2022-07-19 RX ADMIN — METOPROLOL SUCCINATE 50 MG: 50 TABLET, EXTENDED RELEASE ORAL at 09:40

## 2022-07-19 RX ADMIN — ISOSORBIDE DINITRATE 20 MG: 20 TABLET ORAL at 10:31

## 2022-07-19 RX ADMIN — PANTOPRAZOLE SODIUM 40 MG: 40 TABLET, DELAYED RELEASE ORAL at 08:08

## 2022-07-19 RX ADMIN — OXYCODONE AND ACETAMINOPHEN 1 TABLET: 7.5; 325 TABLET ORAL at 01:42

## 2022-07-19 RX ADMIN — OXYCODONE AND ACETAMINOPHEN 1 TABLET: 7.5; 325 TABLET ORAL at 08:08

## 2022-07-19 RX ADMIN — HYDRALAZINE HYDROCHLORIDE 20 MG: 20 INJECTION INTRAMUSCULAR; INTRAVENOUS at 11:45

## 2022-07-19 RX ADMIN — CYCLOBENZAPRINE 5 MG: 10 TABLET, FILM COATED ORAL at 11:37

## 2022-07-19 RX ADMIN — Medication 10 ML: at 01:45

## 2022-07-19 RX ADMIN — CLOPIDOGREL BISULFATE 75 MG: 75 TABLET ORAL at 09:40

## 2022-07-19 RX ADMIN — PRAVASTATIN SODIUM 40 MG: 40 TABLET ORAL at 09:40

## 2022-07-19 RX ADMIN — APIXABAN 2.5 MG: 2.5 TABLET, FILM COATED ORAL at 09:40

## 2022-07-19 RX ADMIN — MUPIROCIN: 20 OINTMENT TOPICAL at 21:58

## 2022-07-19 RX ADMIN — TIOTROPIUM BROMIDE AND OLODATEROL 2 PUFF: 3.124; 2.736 SPRAY, METERED RESPIRATORY (INHALATION) at 09:07

## 2022-07-19 ASSESSMENT — PAIN SCALES - GENERAL
PAINLEVEL_OUTOF10: 9
PAINLEVEL_OUTOF10: 10
PAINLEVEL_OUTOF10: 9
PAINLEVEL_OUTOF10: 4
PAINLEVEL_OUTOF10: 0

## 2022-07-19 NOTE — PROGRESS NOTES
Physical Therapy  Facility/Department: [facility] C4 PCU  Physical Therapy Initial Assessment    Name: Stan Melton  : 1970  MRN: [id number]  Date of Service: 2022    Discharge Recommendations:  Subacute/Skilled Nursing Facility   PT Equipment Recommendations  Equipment Needed: No      Patient Diagnosis(es): The primary encounter diagnosis was Acute on chronic congestive heart failure, unspecified heart failure type ([address] A diagnosis of Hypertensive emergency was also pertinent to this visit. Past Medical History:  has a past medical history of Ambulatory dysfunction, Aortic stenosis, Arthritis, Asthma, Bilateral hilar adenopathy syndrome, CAD (coronary artery disease), Cardiomyopathy ([address] CHF (congestive heart failure) ([address] Chronic pain, COPD (chronic obstructive pulmonary disease) ([address] Depression, Diabetes mellitus ([address] Difficult intravenous access, Emphysema of lung ([address] ESRD (end stage renal disease) on dialysis ([address] Fear of needles, Gastric ulcer, GERD (gastroesophageal reflux disease), Heart valve problem, Hemodialysis patient ([address] History of spinal fracture, Hx of blood clots, Hyperlipidemia, Hypertension, MI (myocardial infarction) ([address] Neuromuscular disorder ([address] Numbness and tingling in left arm, Pneumonia, PONV (postoperative nausea and vomiting), Prolonged emergence from general anesthesia, Sleep apnea, Stroke ([address] TIA (transient ischemic attack), and Unspecified diseases of blood and blood-forming organs. Past Surgical History:  has a past surgical history that includes Tonsillectomy; cyst removal (2013); Colonoscopy; Coronary angioplasty with stent (05/26/15); vascular surgery (aprx 2 years ago); Colonoscopy (); Upper gastrointestinal endoscopy (2016); Upper gastrointestinal endoscopy (2017); other surgical history (2017); Dialysis fistula creation (Left, 10/30/2017);  Diagnostic Cardiac Cath Lab Procedure; other surgical history week. at this time, rec SNF Upon d/c as pt is decreased tolerance for household mobility. DME: defer  Treatment Diagnosis: impaired endurance  Therapy Prognosis: Fair  Decision Making: Medium Complexity  Barriers to Learning: enthusiam  Requires PT Follow-Up: Yes  Activity Tolerance  Activity Tolerance: Treatment limited secondary to agitation  Activity Tolerance Comments: pt declines mulitple therapy assessments. Plan   Plan  Plan: 2-3 times per week  Current Treatment Recommendations: Strengthening, Equipment evaluation, education, & procurement, ROM, Balance training, Gait training, Functional mobility training, Stair training, Transfer training, Neuromuscular re-education, Home exercise program, Safety education & training, Endurance training, Patient/Caregiver education & training, Therapeutic activities, Positioning  Safety Devices  Type of Devices: All fall risk precautions in place, All jesusita prominences offloaded, Bed alarm in place, Patient at risk for falls, Left in bed, Nurse notified, Call light within reach  Restraints  Restraints Initially in Place: No     Restrictions  Restrictions/Precautions  Restrictions/Precautions: Fall Risk, General Precautions, Up as Tolerated  Position Activity Restriction  Other position/activity restrictions: 2-3L O2, tele     Subjective   Pain: denies pain  General  Chart Reviewed: Yes  Patient assessed for rehabilitation services?: Yes  Response To Previous Treatment: Not applicable  Family / Caregiver Present: No  Referring Practitioner: Fabi Field RN  Referral Date : 07/19/22  Diagnosis: HTN emergency  Follows Commands: Within Functional Limits  General Comment  Comments: Rn cleared pt to participate. pt agitated during evaluation  Subjective  Subjective: denies pain, found in bed.          Social/Functional History  Social/Functional History  Lives With: Spouse  Type of Home: House  Home Layout: One level  Home Access: Ramped entrance  Bathroom Shower/Tub: Tub/Shower unit  Bathroom Toilet: Standard  Bathroom Accessibility: Accessible, Wheelchair accessible, Walker accessible  Home Equipment: Electric scooter, Grab bars, Oxygen, Rollator, Reacher, Walker, rolling  Has the patient had two or more falls in the past year or any fall with injury in the past year?: No (reports a minimum of 2 falls per week, often more states pt)  Receives Help From: Family  ADL Assistance: Needs assistance  Toileting: Needs assistance  Homemaking Assistance: Needs assistance  Ambulation Assistance: Needs assistance (with 4ww)  Transfer Assistance: Needs assistance  Active : No  Patient's  Info: wife  Mode of Transportation: Car  Occupation: On disability, Unemployed  Additional Comments: Pt reporting 5-6 falls a week, only ambulating ~10-20 ft at a time and using electric scooter in the community. pt stating he does not do \"a lot\" at baseline. lives sedentary life  Vision/Hearing  Vision  Vision: Impaired  Vision Exceptions: Wears glasses for reading  Hearing  Hearing: Within functional limits    Cognition         Objective   Heart Rate: 79  Heart Rate Source: Monitor  BP: (!) 160/80  BP Location: Right upper arm  BP Method: Automatic  Patient Position: Sitting  MAP (Calculated): 106.67  Resp: 18  SpO2: 98 %  O2 Device: Nasal cannula  Comment: 2 Lo2     Observation/Palpation  Posture: Fair           Strength RLE  Strength RLE: WFL  Comment: grossly 4-/5  Strength LLE  Strength LLE: WFL  Comment: grossly 4-/5           Bed mobility  Supine to Sit: Stand by assistance  Sit to Supine: Stand by assistance  Scooting: Stand by assistance  Bed Mobility Comments: with HOB elevated and use of bed rail. has hospital bed at baseline  Transfers  Sit to Stand: Contact guard assistance  Stand to sit: Contact guard assistance  Comment: sit to stand EOB to no AD and EOB to RW with CGA and multiple attempts.  pt declines to sit in chair  Ambulation  Surface: level tile  Device: Rolling Walker  Assistance: Contact guard assistance  Quality of Gait: dedcreased step height/length  Distance: 4 ft  Comments: side stepping to HOB with RW. pt declines use of gait belt this am, thus furhter mobility testing deferred for safety concerns as well as pt's hx of multiple falls. More Ambulation?: No     Balance  Sitting - Static: Good  Sitting - Dynamic: Good  Standing - Static: Fair;+  Standing - Dynamic: Fair  Comments: minimal to moderate trunk swaying with static/dyn standing at EOB with 0-2 UE support and CGA-min a for balance, pt reaching for environment for support. pt completed 5 BLE alternating marhces with BUE support and CGA prior to fatigue                                    AM-PAC Score     AM-PAC Inpatient Mobility without Stair Climbing Raw Score : 14 (07/19/22 1147)  AM-PAC Inpatient without Stair Climbing T-Scale Score : 40.85 (07/19/22 1147)  Mobility Inpatient CMS 0-100% Score: 53.33 (07/19/22 1147)  Mobility Inpatient without Stair CMS G-Code Modifier : CK (07/19/22 1147)       Goals  Short Term Goals  Time Frame for Short term goals: 7 days 7/26  Short term goal 1: pt will complete bed mobility with mod ind  Short term goal 2: pt will complete functional transfer wiht supv and LRAD. Short term goal 3: pt will ambualte 30 ft with LRAD and SBA. Short term goal 4: pt will tolerate 10-15 reps of BLE seated/supine there x by 7/23 to maintain functional strength. Patient Goals   Patient goals : \"to go home\"       Education  Patient Education  Education Given To: Patient  Education Provided: Role of Therapy;Plan of Care;Transfer Training;Equipment  Education Provided Comments: role of therapy, poc, safety measures of hospital/ benefits of OOB activity  Education Method: Demonstration;Verbal  Barriers to Learning: None  Education Outcome: Continued education needed; Unable to demonstrate understanding (pt resistant to education)      Therapy Time   Individual Concurrent Group Co-treatment   Time In 0955         Time Out 1020         Minutes 25         Timed Code Treatment Minutes: 15 Minutes       Karo Johnson, PT

## 2022-07-19 NOTE — PROGRESS NOTES
07/19/22 0008   NIV Type   $NIV $Daily Charge   Equipment Type v60   Mode CPAP   Mask Type Full face mask   Mask Size Large   Settings/Measurements   CPAP/EPAP 10 cmH2O   Resp 16   FiO2  30 %   Vt Exhaled 482 mL   Comfort Level Good   Using Accessory Muscles No   SpO2 98   Patient's Home Machine No   Alarm Settings   Alarms On Y   Low Pressure 6 cmH2O   High Pressure  30 cmH2O   Apnea (secs) 20 secs   RR Low  6   RR High 40 br/min 3

## 2022-07-19 NOTE — PLAN OF CARE
Patient's EF (Ejection Fraction) is less than 40%    Heart Failure Medications:  Diuretics[de-identified] Furosemide, Torsemide, Spironolactone, Metalozone, Other, and None    (One of the following REQUIRED for EF </= 40%/SYSTOLIC FAILURE but MAY be used in EF% >40%/DIASTOLIC FAILURE)        ACE[de-identified] None        ARB[de-identified] Valsartan         ARNI[de-identified] Sacubitril/Valsartan-Entresto    (Beta Blockers)  NON- Evidenced Based Beta Blocker (for EF% >40%/DIASTOLIC FAILURE): Metoprolol TARTrate- Lopressor    Evidenced Based Beta Blocker::(REQUIRED for EF% <40%/SYSTOLIC FAILURE) Metoprolol SUCCinate- Toprol XL  . .................................................................................................................................................. Patient's weights and intake/output reviewed: Yes    Patient's Last Weight: 233 lbs obtained by bed scale. Difference of 7 lbs less than last documented weight.       Intake/Output Summary (Last 24 hours) at 7/19/2022 1157  Last data filed at 7/19/2022 0600  Gross per 24 hour   Intake 1390 ml   Output --   Net 1390 ml       Comorbidities Reviewed Yes    Patient has a past medical history of Ambulatory dysfunction, Aortic stenosis, Arthritis, Asthma, Bilateral hilar adenopathy syndrome, CAD (coronary artery disease), Cardiomyopathy ([address] CHF (congestive heart failure) ([address] Chronic pain, COPD (chronic obstructive pulmonary disease) ([address] Depression, Diabetes mellitus ([address] Difficult intravenous access, Emphysema of lung ([address] ESRD (end stage renal disease) on dialysis ([facility] Fear of needles, Gastric ulcer, GERD (gastroesophageal reflux disease), Heart valve problem, Hemodialysis patient ([address] History of spinal fracture, Hx of blood clots, Hyperlipidemia, Hypertension, MI (myocardial infarction) ([address] Neuromuscular disorder ([address] Numbness and tingling in left arm, Pneumonia, PONV (postoperative nausea and vomiting), Prolonged emergence from general anesthesia, Sleep apnea, Stroke ([address] TIA (transient ischemic attack), and Unspecified diseases of blood and blood-forming organs. >>For CHF and Comorbidity documentation on Education Time and Topics, please see Education Tab    Progressive Mobility Assessment:  What is this patient's Current Level of Mobility?: Ambulatory-Up Ad Magalis  How was this patient Mobilized today?: Edge of Bed, Up to Chair, Bedside Commode,  Up to Toilet/Shower, Up in Room, Up in Kimmie, Unable to Mobilize, and Patient Refuses to Mobilize, ambulated 100 ft                 With Whom? Nurse, PCA, PT, OT, and Self                 Level of Difficulty/Assistance: Independent     Pt resting in bed at this time on BiPAP. Pt denies shortness of breath. Pt with nonpitting lower extremity edema.      Patient and/or Family's stated Goal of Care this Admission: reduce shortness of breath, increase activity tolerance, better understand heart failure and disease management, be more comfortable, and reduce lower extremity edema prior to discharge        :

## 2022-07-19 NOTE — PROGRESS NOTES
[facility] Progress Note           Subjective/   45y.o. year old male who we are seeing in consultation for ESKD on HD. HPI:  Last HD on 7/18 with 3 liters removed, post-weight of 105.7kg. ROS:  Intake adequate, +weak.     Objective/   GEN:  Chronically ill, BP (!) 143/89   Pulse 61   Temp 97.9 °F (36.6 °C) (Oral)   Resp 18   Ht 5' 9\" (1.753 m)   Wt 242 lb 14.6 oz (110.2 kg)   SpO2 96%   BMI 35.87 kg/m²   HEENT: non-icteric, no JVD  CV: S1, S2 without m/r/g; trace LE edema  RESP: CTA B without w/r/r; breathing wnl  ABD: +bs, soft, nt, no hsm  SKIN: warm, no rashes  ACCESS: Left [facility]    Data/  Recent Labs     07/17/22  0208 07/19/22  0440   WBC 11.9* 9.6   HGB 10.6* 9.8*   HCT 32.5* 29.2*   MCV 90.5 88.3    241       Recent Labs     07/17/22  0208 07/18/22  0509 07/19/22  0440   * 132* 133*   K 4.3 4.3 4.1   CL 90* 92* 93*   CO2 25 23 25   GLUCOSE 152* 310* 209*   PHOS  --  5.3* 4.7   MG  --  1.80 1.70*   BUN 48* 49* 43*   CREATININE 6.5* 5.5* 4.3*   LABGLOM 9* 11* 14*   GFRAA 11* 13* 17*         Assessment/     -ESKD on HD Monday Wednesday Friday at [facility]               Target weight on discharge on 7/9 from hospital was 106 kg               Urgent dialysis on 7/17 and 7/18 with 4 / 3L UF, last post weight 105.7kg    -Acute on chronic respiratory failure on BiPAP and now weaned off to nasal cannula    -Hyponatremia from volume overload    -Anemia - DAVON with HD    -CKD/MBD               Patient on Hectorol 8 mcg as outpatient    -Hypertension, poorly controlled, better with UF with HD     -History of CHF with reduced EF, EF around 20 to 25%    -Bicuspid severe aortic stenosis status post TAVR on 10/2019 along with CAD status post RCA stenting in 1/14/2022 and PATRICIA LAD on 2015      Plan/     - HD tomorrow per schedule - UF as tolerated with crit line monitoring  - Epogen, Hectorol with HD  - Trend labs, bp's    ____________________________________  Stas Medley MD  [facility]  [email or web address]  Office: [phone]

## 2022-07-19 NOTE — PROGRESS NOTES
07/19/22 0340   NIV Type   Skin Protection for O2 Device No  (pt does not want mepilex)   Equipment Type v60   Mode CPAP   Mask Type Full face mask   Mask Size Large   Settings/Measurements   PIP Observed 11 cm H20   CPAP/EPAP 10 cmH2O   Resp 18   FiO2  30 %   Vt Exhaled 406 mL   Minute Volume 7 Liters   Mask Leak (lpm) 54 lpm   Comfort Level Good   Using Accessory Muscles No   SpO2 98   Patient's Home Machine No   Alarm Settings   Alarms On Y   Low Pressure 6 cmH2O   High Pressure  30 cmH2O   Apnea (secs) 20 secs   RR Low  6   RR High 40 br/min

## 2022-07-19 NOTE — PLAN OF CARE
Problem: Discharge Planning  Goal: Discharge to home or other facility with appropriate resources  7/19/2022 1739 by Alaina Gonzáles RN  Outcome: Progressing  7/19/2022 1203 by Ney Adam RN  Outcome: Progressing  7/19/2022 1202 by Ney Adam RN  Outcome: Progressing  Flowsheets (Taken 7/19/2022 0907 by Alaina Gonzáles RN)  Discharge to home or other facility with appropriate resources: Identify barriers to discharge with patient and caregiver     Problem: Pain  Goal: Verbalizes/displays adequate comfort level or baseline comfort level  7/19/2022 1739 by Alaina Gonzáles RN  Outcome: Progressing  7/19/2022 1203 by Ney Adam RN  Outcome: Progressing  7/19/2022 1202 by Ney Adam RN  Outcome: Progressing     Problem: Safety - Adult  Goal: Free from fall injury  7/19/2022 1739 by Alaina Gonzáles RN  Outcome: Progressing  7/19/2022 1203 by Ney Adam RN  Outcome: Progressing

## 2022-07-19 NOTE — PROGRESS NOTES
07/18/22 2342   NIV Type   Skin Assessment Clean, dry, & intact   Skin Protection for O2 Device No   Location   (pt request no mepilex)   NIV Started/Stopped On   Equipment Type v60   Mode CPAP   Mask Type Full face mask   Mask Size Large   Settings/Measurements   PIP Observed 10 cm H20   CPAP/EPAP 10 cmH2O   Resp 13   FiO2  30 %   Vt Exhaled 591 mL   Minute Volume 8 Liters   Mask Leak (lpm) 45 lpm   Comfort Level Good   Using Accessory Muscles No   SpO2 99   Alarm Settings   Alarms On Y   Low Pressure 6 cmH2O   High Pressure  30 cmH2O   Apnea (secs) 20 secs   RR Low  6   RR High 40 br/min

## 2022-07-19 NOTE — CARE COORDINATION
Therapy rec's of SNF noted. Discussed rec's with patient but he is not in agreement with this plan. He states he will discharge with resumption of care through [facility]. Encouraged him to please notify CM should he change his mind. He verbalized understanding.

## 2022-07-19 NOTE — PROGRESS NOTES
Hospitalist Progress Note      PCP: Aleida Brownlee MD    Date of Admission: 7/17/2022        Subjective:     Shortness of breath is better but still present. .... RN reports episode of nausea and vomiting after my rounds this morning. .  No abdominal pain, distention, fever or chills. No diarrhea or constipation      Medications:  Reviewed    Infusion Medications    sodium chloride      dextrose       Scheduled Medications    doxercalciferol  8 mcg IntraVENous Q MWF    sacubitril-valsartan  2 tablet Oral BID    epoetin siddharth-epbx  5,000 Units IntraVENous Q MWF    clopidogrel  75 mg Oral Daily    isosorbide dinitrate  20 mg Oral TID    metoprolol succinate  50 mg Oral BID    pantoprazole  40 mg Oral QAM AC    pravastatin  40 mg Oral Daily    tiotropium-olodaterol  2 puff Inhalation Daily    traZODone  100 mg Oral Nightly    sodium chloride flush  5-40 mL IntraVENous 2 times per day    insulin lispro  0-6 Units SubCUTAneous TID WC    insulin lispro  0-3 Units SubCUTAneous Nightly    apixaban  2.5 mg Oral BID     PRN Meds: ondansetron, albuterol sulfate HFA, cyclobenzaprine, ipratropium-albuterol, oxyCODONE-acetaminophen, sodium chloride flush, sodium chloride, acetaminophen **OR** acetaminophen, prochlorperazine, hydrALAZINE, glucose, dextrose bolus **OR** dextrose bolus, glucagon (rDNA), dextrose, labetalol, heparin (porcine)      Intake/Output Summary (Last 24 hours) at 7/19/2022 1305  Last data filed at 7/19/2022 0600  Gross per 24 hour   Intake 1090 ml   Output --   Net 1090 ml         Physical Exam Performed:    BP (!) 160/80   Pulse 79   Temp 97.8 °F (36.6 °C) (Oral)   Resp 18   Ht 5' 9\" (1.753 m)   Wt 242 lb 14.6 oz (110.2 kg)   SpO2 98%   BMI 35.87 kg/m²     General appearance: No apparent distress, appears stated age and cooperative. HEENT: Pupils equal, round, and reactive to light. Conjunctivae/corneas clear. Neck: Supple, with full range of motion. No jugular venous distention.  Trachea midline. Respiratory:  Normal respiratory effort. Clear to auscultation, bilaterally without Rales/Wheezes/Rhonchi. Cardiovascular: Regular rate and rhythm with normal S1/S2 without murmurs, rubs or gallops. Abdomen: Soft, non-tender, non-distended with normal bowel sounds. Musculoskeletal: No clubbing, cyanosis or edema bilaterally. Full range of motion without deformity. Skin: Skin color, texture, turgor normal.  No rashes or lesions. Neurologic:  Neurovascularly intact without any focal sensory/motor deficits. Cranial nerves: II-XII intact, grossly non-focal.  Psychiatric: Alert and oriented, thought content appropriate, normal insight  Capillary Refill: Brisk,3 seconds, normal   Peripheral Pulses: +2 palpable, equal bilaterally       Labs:   Recent Labs     07/17/22  0208 07/19/22  0440   WBC 11.9* 9.6   HGB 10.6* 9.8*   HCT 32.5* 29.2*    241       Recent Labs     07/17/22  0208 07/18/22  0509 07/19/22  0440   * 132* 133*   K 4.3 4.3 4.1   CL 90* 92* 93*   CO2 25 23 25   BUN 48* 49* 43*   CREATININE 6.5* 5.5* 4.3*   CALCIUM 9.0 9.0 8.5   PHOS  --  5.3* 4.7       Recent Labs     07/17/22  0208   AST 16   ALT 8*   BILITOT 0.3   ALKPHOS 95       Recent Labs     07/17/22  0310   INR 1.37*       Recent Labs     07/17/22  0208 07/17/22  0546 07/17/22  0958   TROPONINI 0.12* 0.12* 0.10*         Urinalysis:      Lab Results   Component Value Date/Time    NITRU Negative 05/29/2022 12:51 PM    WBCUA 10-20 05/29/2022 12:51 PM    BACTERIA 3+ 05/29/2022 12:51 PM    RBCUA 5-10 05/29/2022 12:51 PM    BLOODU TRACE-INTACT 05/29/2022 12:51 PM    SPECGRAV 1.015 05/29/2022 12:51 PM    GLUCOSEU 100 05/29/2022 12:51 PM       Radiology:  XR CHEST PORTABLE   Final Result   Left basilar opacification and pleural effusion are redemonstrated. Pulmonary vascular congestion with interstitial and perihilar opacities   suggesting edema has mildly increased.                  Assessment/Plan:    -Acute on chronic

## 2022-07-19 NOTE — PROGRESS NOTES
Shaylee 79   Daily Progress Note    Admit Date:  7/17/2022  HPI:    Chief Complaint   Patient presents with    Shortness of Breath     Seen multiple time for this, \"wants the fluids off is lungs\"  states \"I'm not going home you have to do something\"         Fatemeh Orellana presented with worsening shortness of breath. Hx of CAD s/p PCI to LAD in 2015, RCA 1/2022, AS s/p TAVR 2019, ICM, s/d CHF, A. Fib (Eliquis), HYPERTENSION, HLD, DM, PAD s/p PTA R iliac artery, ESRD on HD, chronic anemia, ZAINAB on CPAP, COPD, and hx of CVA. Subjective:  Mr. Peri Damon awake and alert, breathing better but still with shortness of breath. This is improved with CPAP. Was not taking Entresto at home as did not get it filled.       Objective:   Patient Vitals for the past 24 hrs:   BP Temp Temp src Pulse Resp SpO2 Weight   07/19/22 1043 (!) 160/80 -- -- 79 -- 96 % --   07/19/22 0939 (!) 143/89 97.9 °F (36.6 °C) Oral 61 18 96 % --   07/19/22 0907 -- -- -- 65 18 95 % --   07/19/22 0838 -- -- -- -- 18 -- --   07/19/22 0808 -- -- -- -- 16 -- 242 lb 14.6 oz (110.2 kg)   07/19/22 0658 (!) 165/91 97.4 °F (36.3 °C) Axillary 64 16 98 % --   07/19/22 0340 -- -- -- -- 18 -- --   07/19/22 0212 -- -- -- -- 20 -- --   07/19/22 0145 -- 97.9 °F (36.6 °C) Oral 70 16 96 % --   07/19/22 0008 -- -- -- -- 16 -- --   07/18/22 2342 -- -- -- -- 13 -- --   07/18/22 2229 (!) 155/74 -- -- 69 -- -- --   07/18/22 2228 (!) 155/74 97.9 °F (36.6 °C) Oral 69 20 99 % --   07/18/22 1946 (!) 169/104 97.8 °F (36.6 °C) Oral 75 20 100 % --   07/18/22 1858 (!) 173/103 98.2 °F (36.8 °C) Oral 70 17 99 % --   07/18/22 1853 -- -- -- -- 18 -- --   07/18/22 1817 138/60 98.5 °F (36.9 °C) -- 64 16 -- 233 lb 0.4 oz (105.7 kg)   07/18/22 1503 (!) 147/64 97.6 °F (36.4 °C) -- 60 16 -- 240 lb 1.3 oz (108.9 kg)   07/18/22 1240 (!) 172/79 97.6 °F (36.4 °C) Oral 66 18 100 % --         Intake/Output Summary (Last 24 hours) at 7/19/2022 1129  Last data filed at 7/19/2022 0600  Gross per 24 hour   Intake 1390 ml   Output --   Net 1390 ml       Wt Readings from Last 3 Encounters:   07/19/22 242 lb 14.6 oz (110.2 kg)   07/08/22 232 lb 12.9 oz (105.6 kg)   07/04/22 242 lb (109.8 kg)         ASSESSMENT:   HFrEF: elevated BNP (chronic), shortness of breath, fluid overload  CAD: hx PCI, recent LHC with moderate to severe in-stent stenosis, medical management recommended, on Plavix, beta blocker, statin, nitrate  ICM: EF 20-25%, on Entresto, Toprol  AS/ TAVR: 2019, stable by recent echo  AFIB: currently SR, anticoagulation with Elliquis  HYPERTENSION: remains sub-optimal, often comes in with hypertensive emergency, likely related to med non-compliance  ESRD: HD/ UF per nephrology  DM2  PAD  ZAINAB  Hx CVA      PLAN:  Continue Entresto to 49/51 mg bid and Toprol 50 mg bid  Fluid management per HD/ nephrology - agree with driving dry weight lower  Continue Eliquis for PAF  Continue Plavix and statin for CAD/ recent PCI  Continue nitrate for CAD/ angina  Daily weights, labs    JAY Shea - CNP, 7/19/2022, 11:29 AM  [facility]   [phone]       Telemetry: SR 50-70,  isolated PVC's  NYHA: III    Physical Exam:  General:  Awake, alert, NAD  Skin:  Warm and dry  Neck:  JVP difficult to assess  Chest:  Clear to auscultation though diminished  Cardiovascular:  RRR, normal S1S2, + murmur, no g/r  Abdomen:  Soft, nontender, +bowel sounds  Extremities:  No BLE edema      Medications:    doxercalciferol  8 mcg IntraVENous Q MWF    sacubitril-valsartan  2 tablet Oral BID    epoetin siddharth-epbx  5,000 Units IntraVENous Q MWF    clopidogrel  75 mg Oral Daily    isosorbide dinitrate  20 mg Oral TID    metoprolol succinate  50 mg Oral BID    pantoprazole  40 mg Oral QAM AC    pravastatin  40 mg Oral Daily    tiotropium-olodaterol  2 puff Inhalation Daily    traZODone  100 mg Oral Nightly    sodium chloride flush  5-40 mL IntraVENous 2 times per day    insulin lispro  0-6 Units SubCUTAneous TID WC    insulin lispro  0-3 Units SubCUTAneous Nightly    apixaban  2.5 mg Oral BID      sodium chloride      dextrose         Lab Data: Lab results independently reviewed and analyzed by myself 7/19/2022    CBC:   Recent Labs     07/17/22  0208 07/19/22  0440   WBC 11.9* 9.6   HGB 10.6* 9.8*    241       BMP:    Recent Labs     07/17/22  0208 07/18/22  0509 07/19/22  0440   * 132* 133*   K 4.3 4.3 4.1   CO2 25 23 25   BUN 48* 49* 43*   CREATININE 6.5* 5.5* 4.3*       INR:    Recent Labs     07/17/22  0310   INR 1.37*       BNP:    Recent Labs     07/17/22  0208   PROBNP >70,000*       Cardiac Enzymes:   Recent Labs     07/17/22  0208 07/17/22  0546 07/17/22  0958   TROPONINI 0.12* 0.12* 0.10*       Lipids:   Lab Results   Component Value Date/Time    TRIG 76 07/17/2022 05:46 AM    TRIG 213 03/18/2022 06:57 AM    HDL 52 07/17/2022 05:46 AM    HDL 38 03/18/2022 06:57 AM    LDLCALC 80 07/17/2022 05:46 AM    LDLCALC 86 03/18/2022 06:57 AM    LDLDIRECT 199 09/04/2014 10:33 AM    LDLDIRECT 172 03/07/2014 09:37 AM       Cardiac Imaging:   CARDIAC CATH 6/7/22:   FINDINGS   HEMODYNAMICS   CHAMBER PRESSURE SATURATION   RA  10 60%   RV  47/9     PA  50/20  67%   PW  19     AORTA  101/50  95%      NANCY CARDIAC OUTPUT  5.8 L/min   SVR  756    PVR  234       LVGRAM   LVEDP  21   GRADIENT ACROSS AORTIC VALVE  less than 5 mmHg   LV FUNCTION EF 20%   WALL MOTION  severe global hypokinesis with regional variation   MITRAL REGURGITATION  mild      CORONARY ARTERIES   LM Less than 10% ynkttyxg-jnd-iwqyox stenosis            LAD Moderately calcified, 20 to 30% proximal-mid stenosis, distal vessel tapers at the apex with 60% diffuse disease. D1 has an ostial/proximal 75 to 80% stenosis. LCX  Calcified, proximal-mid 75 to 80% stenosis. Distal vessel is tortuous with 70 to 85% diffuse stenosis with more discrete stenosis noted distally.      OM 1 is a very small vessel with ostial/proximal 80% stenosis and 60 to 70% diffuse disease in the tjfomodg-aej-fzfpov vessel. Victor Manuelphan MaurerEastman RCA Dominant, calcified, tortuous, there is a proximal-mid stent with 60 to 70% in-stent restenosis. Distal vessel 20 to 30% stenosis      CONCLUSIONS:   Mild to moderately increased filling pressures  Patent RCA stent with moderate to borderline severe in-stent restenosis  Severe circumflex and D1 stenosis  Continue medical management, consider outpatient high risk PCI of above territories pending outpatient clinical follow-up. Currently medical management seems best given comorbidities and need for additional fluid removal.  If PCI is pursued, will likely need general anesthesia. Patient had difficulty lying still on Cath Lab table. Okay to resume Eliquis tomorrow, case/plan/findings discussed with patient and wife, they understand/agree, they stated it is incorrectly stated in chart the patient would refuse blood, he is okay to receive blood products if needed. Continue beta-blocker and Entresto     No further inpatient cardiac work-up or treatment plan, will sign off, please call questions       ECHO 6/3/22:    Summary    Definity® used for myocardial border enhancement. Left ventricular systolic function is severely reduced with a visually estimated ejection fraction of 20-25 %. EF estimated by Jasmine's method at 24 %. The left ventricle is mildly dilated in size with normal wall thickness. Severe global hypokinesis with regional variation. Grade I diastolic dysfunction with normal LV pressure. There is no evidence of a left ventricular thrombus. Right ventricular systolic function is reduced. A 29 mm Langford Leyda S3 bioprosthetic aortic valve appears well seated with normal Doppler values. Inadequate tricuspid valve regurgitation to estimate systolic pulmonary artery pressure. There is a moderate left pleural effusion noted.       Lexiscan nuclear stress test 6/2/2022:   Severe LV dysfunction EF 26%    Global hypokinesis with regional variation, more pronounced in inferolateral    wall and apex    Large mainly fixed defect in inferior wall, extends to portions of lateral    wall and apex with moderate darrick-infarct ischemia        Overall, this would be considered an abnormal, higher risk, study          Coronary angiogram 1/14/2022:  1. Left heart catheterization  2. Selective left and right coronary angiogram  3. PCI of the RCA with PATRICIA  Procedure Findings:  1. 99% mid RCA  2. 60-70% CIRC and DIAG  3. Elevated left heart hemodynamics              ~LVEDP 30       Details:              Anamika Gordillo was brought to the cardiac catheterization lab in a fasting state after informed consent was obtained. If the patient was able to provide written consent, it was obtained. The patient's vitals were monitored through out the procedure. The patient was sedated using the appropriate levels of sedation and ASA guidelines. The appropriate access site area was prepped and drapped in a sterile fashion. The area was anesthetized with 2% lidocaine. Using the modified Seldinger technique, an arterial sheath was introduced into the arterial access site using a single anterior wall puncture. The sheath was flushed and prepped in usual fashion. Catheters used during the procedure included 5 Swedish TIG 4.0 catheter. The catheters were advanced and removed over a .035\" wire, into the appropriate positions. Multiple angiographic views were obtained. An LV gram was not obtained. ~The interventional portion of the procedure was completed utilizing a multipurpose 6 [place] guide. Multipurpose guide was advanced into the right coronary ostium. A gentleman therapy aspirin, Plavix, Angiomax was initiated. A BMW wire was advanced into the distal right coronary artery.   The lesion was initially predilated with a 2.75 x 15 mm Euphora balloon followed by a 3.5 mm x 20 mm Euphora balloon and subsequently stented with a resolute [place] 4.0 mm x 38 mm balloon. We did experience a no reflow phenomenon. We separately done. A twin pass catheter and then did local drug delivery with 200 mcg of nitroprusside and 200 mcgnitroglycerin. Provided restoration of SCARLETT-3 flow. Findings:  1. Left main coronary artery was normal. It gave off the left anterior descending artery and left circumflex. 2. Left anterior descending artery has 60% severe atherosclerotic disease. It was moderate in size. It gave off septal perforators and a moderate sized diagonal branch. The LAD covered the entire apex of the left ventricle. 3. Left circumflex has 60% severe atherosclerotic disease. It was moderate in size. There was a moderate sized obtuse marginal branch. 4. Right coronary artery has 99% severe atherosclerotic disease. It was moderate in size and was the dominant artery. CONCLUSIONS:  1. Apercutaneous coronary intervention of the right coronary artery utilizing a single resolute [place] drug-eluting stent  ASSESSMENT/RECOMMENDATIONS:  1. Dual antiplatelet therapy  2. Medical management of the remaining coronary disease     Echo 1/2022:   Limited only f/u for LVEF and RVF. The left ventricular systolic function is mildly reduced with an ejection   fraction of 40-45 %. There is hypokinesis of the apex and inferior walls. There is a very small circumferential pericardial effusion noted. No tamponade physiology. The right ventricle is normal in size and function. Echo 7/9/2020:  Low Normal systolic function with an estimated ejection fraction of 50%. Left ventricular function and wall motion is difficult to estimate due to   poor endocardial visualization. Grade I diastolic dysfunction with normal filing pressure.    Normally functioning TAVR 29 mm Langford Leyda S3 bioprosthetic valve in   aortic position appears well seated with a max velocity of 2.11 m/sec,   maximum gradient of 18 mmHg and a mean gradient of 8 mmHg. There is no evidence of aortic regurgitation    Coronary angiogram 8/22/2019:  LM <20%  LAD patent stent  Cx <20%  RCA 30%  Severe AS by echo  Proceed w/ TAVR    Echo 1/24/2017:  Summary   Left ventricular systolic function is normal with an ejection fraction of   55-60%. No wall motion abnormalities noted. Mild concentric left ventricular hypertrophy. Grade II diastolic dysfunction with elevated filling pressure. Mildly dilated left atrium. Moderate aortic stenosis. Mild aortic regurgitation. Compared to previous study from 10/27/2016, aortic stenosis has not   progressed. R/LHC 1/23/2017:  LM <20%  LAD 30% w/ patent stent  Cx 20-30%  RCA 20-30%  LVEDP 19  RA 8, RV 41/10, PA 45/14/31, W 15      Nonobstructive CAD  Mild elevated right heart pressures        Echo Oct 2016:   Normal left ventricle size with mild concentric left ventricular hypertrophy. Normal systolic function with an estimated ejection fraction of 55%. No regional wall motion abnormalities are seen. Elevated left ventricular diastolic filling pressure ( E/e' 21 ). The aortic valve is thickened/calcified with decreased leaflet mobility. Cannot exclude a bicuspid valve. The aortic valve area is calculated at 1.0 cm2 with a max velocity of 3.36 m/sec, maximum pressure gradient of 45 mmHg and a mean pressure gradient of 23 mmHg. This is c/w moderate aortic stenosis. Color flow demonstrates eccentric jet suggesting mild aortic regurgitation. Trace mitral and tricuspid regurgitation. Systolic pulmonary artery pressure (SPAP) is normal and estimated at 22 mmHg (RA pressure 3 mmHg). There is mild concentric left ventricular hypertrophy. MPI 6/18/15: There is a very small and mild fixed posterior-basal defect consistent with  fibrosis. There is no evidence for ischemia. Left ventricular function is reduced with and estimated LVEF of 40%. The LV is severely dilated. CATH: 5/26/15, Dr. Jose De Jesus Ramírez  LM <20%   LAD 70% mid.  FFR 0.77  D1 50% prox  Cx 20%  RCA 20%  LVEDP 22mmHg  RA 9, RV 42/10, PA 44/16/31, W 18  PA sat 63%    PTCA LAD  3.0 x 15 PATRICIA  prox portion post 3.5 x 8 NC balloon    DAPT, statin  Resume lasix and ARB at discharge provided Cr stable  Renal fxn will not tolerate higher dose of lasix than 40 daily  Needs smoking cessation, weight loss, exercise  Rec OP sleep eval

## 2022-07-19 NOTE — PROGRESS NOTES
Occupational Therapy  Facility/Department: [address] [place], [place] [address]  Occupational Therapy Initial Assessment    Name: Анна Dumas  : 1970  MRN: [id number]  Date of Service: 2022    Discharge Recommendations:  24 hour supervision or assist, Home with nursing aide, S Level 3, Home with Home health OT, [address] (SNF vs [facility])  OT Equipment Recommendations  Other: Defer to facility       Patient Diagnosis(es): The primary encounter diagnosis was Acute on chronic congestive heart failure, unspecified heart failure type ([address] A diagnosis of Hypertensive emergency was also pertinent to this visit. Past Medical History:  has a past medical history of Ambulatory dysfunction, Aortic stenosis, Arthritis, Asthma, Bilateral hilar adenopathy syndrome, CAD (coronary artery disease), Cardiomyopathy ([address] CHF (congestive heart failure) ([address] Chronic pain, COPD (chronic obstructive pulmonary disease) ([address] Depression, Diabetes mellitus ([address] Difficult intravenous access, Emphysema of lung ([address] ESRD (end stage renal disease) on dialysis ([address] Fear of needles, Gastric ulcer, GERD (gastroesophageal reflux disease), Heart valve problem, Hemodialysis patient ([address] History of spinal fracture, Hx of blood clots, Hyperlipidemia, Hypertension, MI (myocardial infarction) ([address] Neuromuscular disorder ([address] Numbness and tingling in left arm, Pneumonia, PONV (postoperative nausea and vomiting), Prolonged emergence from general anesthesia, Sleep apnea, Stroke ([address] TIA (transient ischemic attack), and Unspecified diseases of blood and blood-forming organs. Past Surgical History:  has a past surgical history that includes Tonsillectomy; cyst removal (2013); Colonoscopy; Coronary angioplasty with stent (05/26/15); vascular surgery (aprx 2 years ago); Colonoscopy (); Upper gastrointestinal endoscopy (2016);  Upper gastrointestinal endoscopy (2017); other surgical history (02/01/2017); Dialysis fistula creation (Left, 10/30/2017); Diagnostic Cardiac Cath Lab Procedure; other surgical history (2018); other surgical history (Bilateral, 06/26/2018); pr lap insertion tunneled intraperitoneal catheter (N/A, 9/21/2018); other surgical history (Right, 09/2018); Dialysis fistula creation (Left, 3/27/2019); other surgical history (05/28/2019); Endoscopy, colon, diagnostic; Catheter Removal (Right, 7/2/2019); Aortic valve replacement (N/A, 10/15/2019); Rectal surgery (N/A, 2/24/2022); IR TUNNELED CVC PLACE WO SQ PORT/PUMP > 5 YEARS (3/21/2022); IR TUNNELED CVC PLACE WO SQ PORT/PUMP > 5 YEARS (4/21/2022); IR TUNNELED CVC PLACE WO SQ PORT/PUMP > 5 YEARS (4/26/2022); and IR TUNNELED CVC PLACE WO SQ PORT/PUMP > 5 YEARS (6/23/2022). Assessment   Performance deficits / Impairments: Decreased functional mobility ; Decreased posture;Decreased ADL status; Decreased cognition;Decreased balance;Decreased coordination;Decreased safe awareness;Decreased ROM; Decreased endurance;Decreased high-level IADLs;Decreased strength;Decreased fine motor control;Decreased sensation  Assessment: Per chart review \"Pt is a 57-year-old male with significant past medical history of hypertension, hyperlipidemia, type 2 diabetes, ESRD on HD, congestive heart failure, and ischemic cardiomyopathy who presents to [facility] emergency department with complaint of shortness of breath and having fluid on his lungs. He is here very frequently for similar concerns with suspicion of nonadherence to his medical treatment plan for hypertension and ESRD. He is on dialysis Monday Wednesday and Fridays, he states that he went to dialysis this past Friday. He was here and was discharged from our emergency department for shortness of breath and was seen to have pleural effusions. \" Prior to admission pt reported assist with LB dressing, minimal ambulation with 4ww, using electric scooter for community mobility.  Pt reports multiple falls a week and is non-compliant with his medication. Evaluation was limited by patients refusal of gait belt therefore functional mobility wasnt completed however pt grossly CGA sit <> stand with RW. At this time SNF is recommended due to patients lack of standing toelrance/balance and multiple falls at home however will CTA pending progress. Prognosis: Fair  Decision Making: Low Complexity  REQUIRES OT FOLLOW-UP: Yes  Activity Tolerance  Activity Tolerance: Treatment limited secondary to agitation;Patient limited by fatigue;Patient limited by pain  Activity Tolerance Comments: Vitals: BP-160/80, HR 79, O2 95% on 2L O2        Plan   Plan  Times per Week: 2-3x a week  Times per Day: Daily  Plan Weeks: 1 week  Current Treatment Recommendations: Strengthening, Balance training, Functional mobility training, Endurance training, Gait training, Neuromuscular re-education, Pain management, Safety education & training, Equipment evaluation, education, & procurement, Patient/Caregiver education & training, Self-Care / ADL, Home management training, Cognitive/Perceptual training, Coordination training     Restrictions  Restrictions/Precautions  Restrictions/Precautions: Fall Risk, General Precautions, Up as Tolerated  Position Activity Restriction  Other position/activity restrictions: 2-3L O2, tele    Subjective   General  Chart Reviewed: Yes, Orders, Labs, Previous Admission  Patient assessed for rehabilitation services?: Yes  Family / Caregiver Present: No  Referring Practitioner: Luiz Hawkins MD  Diagnosis: Hypertensive emergency  Subjective  Subjective: Pt in bed, mildly resistive to OT eval; reporting not getting OOB and then refusing the gait belt therefore did not assess functional mobility.   General Comment  Comments: Pt reports 4/10 pain in B feet     Social/Functional History  Social/Functional History  Lives With: Spouse  Type of Home: House  Bathroom Shower/Tub: Tub/Shower unit  [place] Toilet: Standard  Bathroom Accessibility: Accessible, Wheelchair accessible, Walker accessible  Home Equipment: Electric scooter, Grab bars, Oxygen, Rollator, Reacher, Walker, rolling  Has the patient had two or more falls in the past year or any fall with injury in the past year?: Yes  Receives Help From: Family  ADL Assistance: Needs assistance  Toileting: Needs assistance  Homemaking Assistance: Needs assistance  Ambulation Assistance: Needs assistance  Transfer Assistance: Needs assistance  Active : No  Patient's  Info: wife  Mode of Transportation: Car  Occupation: On disability, Unemployed  Additional Comments: Pt reporting 5-6 falls a week, only ambulating ~10 ft at a time and using electric scooter in the community       Objective   Heart Rate: 79  Heart Rate Source: Monitor  BP: (!) 160/80  BP Location: Right upper arm  BP Method: Automatic  Patient Position: Supine;Sitting  MAP (Calculated): 106.67  Resp: 18  SpO2: 96 %  O2 Device: Nasal cannula          Observation/Palpation  Posture: Fair  Safety Devices  Type of Devices: All fall risk precautions in place; All jesusita prominences offloaded;Bed alarm in place; Patient at risk for falls; Left in bed;Nurse notified;Call light within reach  Restraints  Restraints Initially in Place: No        AROM: Generally decreased, functional  PROM: Generally decreased, functional  Strength: Generally decreased, functional  Coordination: Generally decreased, functional  Tone: Normal  Sensation: Impaired  ADL  Feeding: Beverage management; Independent  Grooming: Increased time to complete;Supervision;Setup  Grooming Skilled Clinical Factors: washing face sitting EOB  UE Bathing: Supervision;Setup  UE Bathing Skilled Clinical Factors: sitting EOB washing UE due to vomitting  LE Dressing: Increased time to complete;Contact guard assistance;Verbal cueing  LE Dressing Skilled Clinical Factors: sitting EOB  Additional Comments: Pt declined OOB activities and gait belt therefore minimal evaluation was completed  Tone RUE  RUE Tone: Normotonic  Tone LUE  LUE Tone: Normotonic  Coordination  Movements Are Fluid And Coordinated: Yes     Bed mobility  Bridging: Stand by assistance  Rolling to Left: Stand by assistance  Rolling to Right: Stand by assistance  Supine to Sit: Stand by assistance  Sit to Supine: Stand by assistance  Scooting: Stand by assistance  Transfers  Sit to stand: Stand by assistance;Contact guard assistance  Stand to sit: Contact guard assistance;Stand by assistance  Transfer Comments: CGA with RW, mod cues for safety/sequencing and importance of upright posture; refusing gait belt therefore further ambulation was not completed  Vision - Basic Assessment  Prior Vision: No visual deficits  Visual History: No significant visual history  Patient Visual Report: No visual complaint reported. Visual Field Cut: No  Oculo Motor Control: WNL  Cognition  Overall Cognitive Status: Exceptions  Arousal/Alertness: Appropriate responses to stimuli  Following Commands: Follows multistep commands with repitition; Follows one step commands with repetition  Attention Span: Attends with cues to redirect; Difficulty attending to directions  Memory: Decreased recall of precautions;Decreased long term memory;Decreased short term memory  Safety Judgement: Decreased awareness of need for assistance;Decreased awareness of need for safety  Problem Solving: Assistance required to correct errors made;Assistance required to identify errors made;Assistance required to generate solutions;Assistance required to implement solutions  Insights: Decreased awareness of deficits  Initiation: Requires cues for some  Sequencing: Requires cues for some  Cognition Comment: Pt agitated throughout needing extensive cues on importance of OOB activities, sitting in chair vs bed.   Pt refusing the gait belt therefore did not complete functional mobility  Orientation  Overall Orientation Status: Within Functional Limits  Orientation Level: Oriented X4  Perception  Overall Perceptual Status: Impaired  Unilateral Attention: Appears intact  Initiation: Cues to initiate tasks  Motor Planning: Cues to use objects appropriately  Perseveration: Perseverates during conversation     Sensation  Overall Sensation Status: Impaired  Light Touch: Severe deficits in the RLE; Severe deficits in the LLE            LUE AROM (degrees)  LUE AROM : WFL  Left Hand AROM (degrees)  Left Hand AROM: WFL  RUE AROM (degrees)  RUE AROM : WFL  Right Hand AROM (degrees)  Right Hand AROM: WFL  LUE Strength  Gross LUE Strength: WNL  L Hand General: 3/5  RUE Strength  Gross RUE Strength:  WNL  R Hand General: 3/5     Hand Dominance  Hand Dominance: Right       AM-PAC Score        AM-PAC Inpatient Daily Activity Raw Score: 14 (07/19/22 1101)  AM-PAC Inpatient ADL T-Scale Score : 33.39 (07/19/22 1101)  ADL Inpatient CMS 0-100% Score: 59.67 (07/19/22 1101)  ADL Inpatient CMS G-Code Modifier : CK (07/19/22 1101)    Goals  Short Term Goals  Time Frame for Short term goals: 1 week unless otherwise specified  Short Term Goal 1: Pt will verbalize understanding and 50% compliance of CHF education to reduce hospitalizations  Short Term Goal 2: Pt will complete functional mobility/transfers SPV with LRAD by 7/22  Short Term Goal 3: Pt will complete UE bathing/dressing and simple grooming sitting Shanda  Patient Goals   Patient goals : \"I dont have one\"       Therapy Time   Individual Concurrent Group Co-treatment   Time In 0955         Time Out 1020         Minutes 25         Timed Code Treatment Minutes: 15 Minutes (10 min eval)       SHANE Decker/L

## 2022-07-19 NOTE — CONSULTS
Department of Orthopedic Surgery  Dr. Abigail Ohara Consult Note        Reason for Consult: wound on great toe  Requesting Physician: Dr. Adrianne Hernandez:      History Obtained From:  patient    HISTORY OF PRESENT ILLNESS:                The patient is a 45 y.o. male who presented to ED for shortness of breath. Patient is well known to [facility] and has PMH of CHF, uncontrolled diabetes, aortic stenosis, CAD, and ESRD on dialysis and COPD. He states he has neuropathy of feet and left ankle sock on too long and caused right lower leg wound and he is not sure how he injured his right great toe and nail but he \"likes to pick his toe\".      Past Medical History:        Diagnosis Date    Ambulatory dysfunction     walker for long distances, SOB with distance    Aortic stenosis     echo 2017    Arthritis     hands and hips    Asthma     Bilateral hilar adenopathy syndrome 6/3/2013    CAD (coronary artery disease)     Dr. Catalan [facility]) 04/19/2019    EF= 43%    CHF (congestive heart failure) ([address]     Chronic pain     COPD (chronic obstructive pulmonary disease) ([address]     pulmonology Dr. Rich [facility]    Depression     Diabetes mellitus ([address]     borderline    Difficult intravenous access     Emphysema of lung ([address]     ESRD (end stage renal disease) on dialysis ([address]     MWF    Fear of needles     Gastric ulcer     GERD (gastroesophageal reflux disease)     Heart valve problem     bicuspic valve    Hemodialysis patient ([address]     History of spinal fracture     work incident    Hx of blood clots     Bilateral lower extremities; stents in place    Hyperlipidemia     Hypertension     MI (myocardial infarction) ([address] 2019    has had 9 MIs. 2019 was the last    Neuromuscular disorder ([address]     due to CVA    Numbness and tingling in left arm     from fistula    Pneumonia     PONV (postoperative nausea and vomiting)     Prolonged emergence from general anesthesia     States requires more medication dialysis port placed on right side of abdomen    OTHER SURGICAL HISTORY  05/28/2019    PTA/Stenting R External Iliac artery    ID LAP INSERTION TUNNELED INTRAPERITONEAL CATHETER N/A 9/21/2018    LAPAROSCOPIC PERITONEAL DIALYSIS CATHETER REPLACEMENT performed by José Luis Valles MD at [address] 2/24/2022    PERINEAL ABCESS DRAINAGE performed by José Luis Valles MD at [address]  01/06/2016    UPPER GASTROINTESTINAL ENDOSCOPY  01/29/2017    possible candida, otherwise normal appearing    VASCULAR SURGERY  aprx 2 years ago    2 stents placed, each side of groin     Current Medications:   Current Facility-Administered Medications: ondansetron (ZOFRAN) injection 4 mg, 4 mg, IntraVENous, Q6H PRN  doxercalciferol (HECTOROL) injection 8 mcg, 8 mcg, IntraVENous, Q MWF  sacubitril-valsartan (ENTRESTO) 24-26 MG per tablet 2 tablet, 2 tablet, Oral, BID  epoetin siddharth-epbx (RETACRIT) injection 5,000 Units, 5,000 Units, IntraVENous, Q MWF  albuterol sulfate HFA (PROVENTIL;VENTOLIN;PROAIR) 108 (90 Base) MCG/ACT inhaler 2 puff, 2 puff, Inhalation, Q6H PRN  clopidogrel (PLAVIX) tablet 75 mg, 75 mg, Oral, Daily  cyclobenzaprine (FLEXERIL) tablet 5 mg, 5 mg, Oral, TID PRN  ipratropium-albuterol (DUONEB) nebulizer solution 1 ampule, 1 ampule, Inhalation, Q6H PRN  isosorbide dinitrate (ISORDIL) tablet 20 mg, 20 mg, Oral, TID  metoprolol succinate (TOPROL XL) extended release tablet 50 mg, 50 mg, Oral, BID  oxyCODONE-acetaminophen (PERCOCET) 7.5-325 MG per tablet 1 tablet, 1 tablet, Oral, Q6H PRN  pantoprazole (PROTONIX) tablet 40 mg, 40 mg, Oral, QAM AC  pravastatin (PRAVACHOL) tablet 40 mg, 40 mg, Oral, Daily  tiotropium-olodaterol (STIOLTO) 2.5-2.5 MCG/ACT inhaler 2 puff, 2 puff, Inhalation, Daily  traZODone (DESYREL) tablet 100 mg, 100 mg, Oral, Nightly  sodium chloride flush 0.9 % injection 5-40 mL, 5-40 mL, IntraVENous, 2 times per day  sodium chloride flush 0.9 % injection 5-40 mL, 5-40 mL, IntraVENous, PRN  0.9 % sodium chloride infusion, , IntraVENous, PRN  acetaminophen (TYLENOL) tablet 650 mg, 650 mg, Oral, Q6H PRN **OR** acetaminophen (TYLENOL) suppository 650 mg, 650 mg, Rectal, Q6H PRN  prochlorperazine (COMPAZINE) injection 10 mg, 10 mg, IntraVENous, Q6H PRN  hydrALAZINE (APRESOLINE) injection 20 mg, 20 mg, IntraVENous, Q6H PRN  glucose chewable tablet 16 g, 4 tablet, Oral, PRN  dextrose bolus 10% 125 mL, 125 mL, IntraVENous, PRN **OR** dextrose bolus 10% 250 mL, 250 mL, IntraVENous, PRN  glucagon (rDNA) injection 1 mg, 1 mg, IntraMUSCular, PRN  dextrose 5 % solution, 100 mL/hr, IntraVENous, PRN  insulin lispro (HUMALOG) injection vial 0-6 Units, 0-6 Units, SubCUTAneous, TID WC  insulin lispro (HUMALOG) injection vial 0-3 Units, 0-3 Units, SubCUTAneous, Nightly  labetalol (NORMODYNE;TRANDATE) injection 10 mg, 10 mg, IntraVENous, Q4H PRN  heparin (porcine) injection 3,600 Units, 3,600 Units, IntraCATHeter, PRN  apixaban (ELIQUIS) tablet 2.5 mg, 2.5 mg, Oral, BID  Allergies:  Morphine    Social History:   TOBACCO:   reports that he quit smoking about 2 years ago. His smoking use included cigarettes. He has a 16.50 pack-year smoking history. He has never used smokeless tobacco.  ETOH:   reports that he does not currently use alcohol. DRUGS:   reports no history of drug use.     Family History:       Problem Relation Age of Onset    Diabetes Mother     Heart Disease Father     Kidney Disease Sister         stage 4-kidney failure    Cancer Sister     Heart Disease Sister     Obesity Sister     Cancer Sister     Heart Disease Sister     Obesity Sister     Alcohol Abuse Brother      REVIEW OF SYSTEMS:    CONSTITUTIONAL:  negative for fever chills nausea or vomiting    RESPIRATORY:  positive for  shortness of breath     PHYSICAL EXAM:    VITALS:  BP (!) 148/84   Pulse 71   Temp 98.1 °F (36.7 °C) (Oral)   Resp 16   Ht 5' 9\" (1.753 m)   Wt 242 lb 14.6 oz (110.2 kg)   SpO2 97%   BMI 35.87 kg/m²   CONSTITUTIONAL:  awake, alert, cooperative, no apparent distress, and appears stated age  EYES:  Lids and lashes normal, pupils equal, round and reactive to light, extra ocular muscles intact, sclera clear, conjunctiva normal  ABDOMEN:  No scars, normal bowel sounds, soft, non-distended, non-tender, no masses palpated, no hepatosplenomegally  Lower extremity exam: Vascular: Dorsalis pedis and posterior tibial pulses are palpable. CFT less than 3 seconds to each digit. Neuro:  Decreased epicritic sensation is noted to b/l feet. Derm: Abrasion is noted to right lower leg with no signs of infection. The right hallux has nail mild incurvation of hallux nail with dried sanginous drainage noted on nail but no signs of infection. Mild incurvation of hallux nail. Hx of contusion right 4th toe with mild dry ecchymosis is noted to right 4th. Nails 1-5th are elongated discolored and thickened. MS: 5/5 MS for all compartments.     DATA:    CBC:   Lab Results   Component Value Date/Time    WBC 9.6 07/19/2022 04:40 AM    RBC 3.31 07/19/2022 04:40 AM    HGB 9.8 07/19/2022 04:40 AM    HCT 29.2 07/19/2022 04:40 AM    MCV 88.3 07/19/2022 04:40 AM    MCH 29.5 07/19/2022 04:40 AM    MCHC 33.5 07/19/2022 04:40 AM    RDW 16.2 07/19/2022 04:40 AM     07/19/2022 04:40 AM    MPV 8.1 07/19/2022 04:40 AM     CMP:    Lab Results   Component Value Date/Time     07/19/2022 04:40 AM    K 4.1 07/19/2022 04:40 AM    K 4.3 07/17/2022 02:08 AM    CL 93 07/19/2022 04:40 AM    CO2 25 07/19/2022 04:40 AM    BUN 43 07/19/2022 04:40 AM    CREATININE 4.3 07/19/2022 04:40 AM    GFRAA 17 07/19/2022 04:40 AM    GFRAA >60 05/17/2013 06:50 AM    AGRATIO 1.3 07/17/2022 02:08 AM    LABGLOM 14 07/19/2022 04:40 AM    GLUCOSE 209 07/19/2022 04:40 AM    PROT 7.3 07/17/2022 02:08 AM    PROT 6.7 12/27/2012 04:18 AM    LABALBU 3.7 07/19/2022 04:40 AM    CALCIUM 8.5 07/19/2022 04:40 AM    BILITOT 0.3 07/17/2022 02:08 AM    ALKPHOS 95 07/17/2022 02:08 AM    AST 16 07/17/2022 02:08 AM    ALT 8 07/17/2022 02:08 AM       IMPRESSION/RECOMMENDATIONS: This is a 45year old male:  Abrasion of right great toe - apply mupirocin and dsd to toe daily   2. Abrasion of right lower leg- apply mupirocin and dsd  daily   3. Onychocryptosis right hallux tibial border- will bring nail nippers to debride nails. 3.  Diabetes type 2 with neuropathy and ESRD   4.  CHF

## 2022-07-20 LAB
ALBUMIN SERPL-MCNC: 3.2 G/DL (ref 3.4–5)
ANION GAP SERPL CALCULATED.3IONS-SCNC: 17 MMOL/L (ref 3–16)
BUN BLDV-MCNC: 57 MG/DL (ref 7–20)
CALCIUM SERPL-MCNC: 8.8 MG/DL (ref 8.3–10.6)
CHLORIDE BLD-SCNC: 94 MMOL/L (ref 99–110)
CO2: 20 MMOL/L (ref 21–32)
CREAT SERPL-MCNC: 6.3 MG/DL (ref 0.9–1.3)
GFR AFRICAN AMERICAN: 11
GFR NON-AFRICAN AMERICAN: 9
GLUCOSE BLD-MCNC: 189 MG/DL (ref 70–99)
GLUCOSE BLD-MCNC: 210 MG/DL (ref 70–99)
GLUCOSE BLD-MCNC: 238 MG/DL (ref 70–99)
GLUCOSE BLD-MCNC: 268 MG/DL (ref 70–99)
HCT VFR BLD CALC: 30.7 % (ref 40.5–52.5)
HEMOGLOBIN: 10.1 G/DL (ref 13.5–17.5)
MAGNESIUM: 1.9 MG/DL (ref 1.8–2.4)
MCH RBC QN AUTO: 29.3 PG (ref 26–34)
MCHC RBC AUTO-ENTMCNC: 32.9 G/DL (ref 31–36)
MCV RBC AUTO: 88.9 FL (ref 80–100)
PDW BLD-RTO: 16.2 % (ref 12.4–15.4)
PERFORMED ON: ABNORMAL
PHOSPHORUS: 5.4 MG/DL (ref 2.5–4.9)
PLATELET # BLD: 235 K/UL (ref 135–450)
PMV BLD AUTO: 7.9 FL (ref 5–10.5)
POTASSIUM SERPL-SCNC: 4.4 MMOL/L (ref 3.5–5.1)
RBC # BLD: 3.45 M/UL (ref 4.2–5.9)
SODIUM BLD-SCNC: 131 MMOL/L (ref 136–145)
WBC # BLD: 9.9 K/UL (ref 4–11)

## 2022-07-20 PROCEDURE — 6370000000 HC RX 637 (ALT 250 FOR IP): Performed by: NURSE PRACTITIONER

## 2022-07-20 PROCEDURE — 99232 SBSQ HOSP IP/OBS MODERATE 35: CPT | Performed by: NURSE PRACTITIONER

## 2022-07-20 PROCEDURE — 1200000000 HC SEMI PRIVATE

## 2022-07-20 PROCEDURE — 6370000000 HC RX 637 (ALT 250 FOR IP): Performed by: INTERNAL MEDICINE

## 2022-07-20 PROCEDURE — 6360000002 HC RX W HCPCS

## 2022-07-20 PROCEDURE — 36415 COLL VENOUS BLD VENIPUNCTURE: CPT

## 2022-07-20 PROCEDURE — 85027 COMPLETE CBC AUTOMATED: CPT

## 2022-07-20 PROCEDURE — 2580000003 HC RX 258: Performed by: NURSE PRACTITIONER

## 2022-07-20 PROCEDURE — 80069 RENAL FUNCTION PANEL: CPT

## 2022-07-20 PROCEDURE — 94761 N-INVAS EAR/PLS OXIMETRY MLT: CPT

## 2022-07-20 PROCEDURE — 2700000000 HC OXYGEN THERAPY PER DAY

## 2022-07-20 PROCEDURE — 83735 ASSAY OF MAGNESIUM: CPT

## 2022-07-20 PROCEDURE — 94640 AIRWAY INHALATION TREATMENT: CPT

## 2022-07-20 RX ORDER — METOPROLOL SUCCINATE 50 MG/1
100 TABLET, EXTENDED RELEASE ORAL 2 TIMES DAILY
Status: DISCONTINUED | OUTPATIENT
Start: 2022-07-20 | End: 2022-07-21 | Stop reason: HOSPADM

## 2022-07-20 RX ORDER — DOXERCALCIFEROL 2 UG/ML
INJECTION, SOLUTION INTRAVENOUS
Status: COMPLETED
Start: 2022-07-20 | End: 2022-07-20

## 2022-07-20 RX ORDER — HEPARIN SODIUM 1000 [USP'U]/ML
INJECTION, SOLUTION INTRAVENOUS; SUBCUTANEOUS
Status: DISPENSED
Start: 2022-07-20 | End: 2022-07-21

## 2022-07-20 RX ADMIN — PRAVASTATIN SODIUM 40 MG: 40 TABLET ORAL at 08:17

## 2022-07-20 RX ADMIN — APIXABAN 2.5 MG: 2.5 TABLET, FILM COATED ORAL at 20:53

## 2022-07-20 RX ADMIN — TRAZODONE HYDROCHLORIDE 100 MG: 50 TABLET ORAL at 20:53

## 2022-07-20 RX ADMIN — DOXERCALCIFEROL 8 MCG: 4 INJECTION, SOLUTION INTRAVENOUS at 17:04

## 2022-07-20 RX ADMIN — PANTOPRAZOLE SODIUM 40 MG: 40 TABLET, DELAYED RELEASE ORAL at 06:29

## 2022-07-20 RX ADMIN — Medication 10 ML: at 20:53

## 2022-07-20 RX ADMIN — ISOSORBIDE DINITRATE 20 MG: 20 TABLET ORAL at 20:53

## 2022-07-20 RX ADMIN — OXYCODONE AND ACETAMINOPHEN 1 TABLET: 7.5; 325 TABLET ORAL at 08:16

## 2022-07-20 RX ADMIN — EPOETIN ALFA-EPBX 2000 UNITS: 2000 INJECTION, SOLUTION INTRAVENOUS; SUBCUTANEOUS at 17:04

## 2022-07-20 RX ADMIN — OXYCODONE AND ACETAMINOPHEN 1 TABLET: 7.5; 325 TABLET ORAL at 20:56

## 2022-07-20 RX ADMIN — METOPROLOL SUCCINATE 100 MG: 50 TABLET, EXTENDED RELEASE ORAL at 20:53

## 2022-07-20 RX ADMIN — Medication 10 ML: at 08:17

## 2022-07-20 RX ADMIN — ISOSORBIDE DINITRATE 20 MG: 20 TABLET ORAL at 13:53

## 2022-07-20 RX ADMIN — METOPROLOL SUCCINATE 50 MG: 50 TABLET, EXTENDED RELEASE ORAL at 08:17

## 2022-07-20 RX ADMIN — ISOSORBIDE DINITRATE 20 MG: 20 TABLET ORAL at 08:17

## 2022-07-20 RX ADMIN — TIOTROPIUM BROMIDE AND OLODATEROL 2 PUFF: 3.124; 2.736 SPRAY, METERED RESPIRATORY (INHALATION) at 09:14

## 2022-07-20 RX ADMIN — CLOPIDOGREL BISULFATE 75 MG: 75 TABLET ORAL at 08:17

## 2022-07-20 RX ADMIN — APIXABAN 2.5 MG: 2.5 TABLET, FILM COATED ORAL at 08:17

## 2022-07-20 RX ADMIN — SACUBITRIL AND VALSARTAN 2 TABLET: 24; 26 TABLET, FILM COATED ORAL at 20:53

## 2022-07-20 RX ADMIN — SACUBITRIL AND VALSARTAN 2 TABLET: 24; 26 TABLET, FILM COATED ORAL at 08:17

## 2022-07-20 RX ADMIN — EPOETIN ALFA-EPBX 3000 UNITS: 3000 INJECTION, SOLUTION INTRAVENOUS; SUBCUTANEOUS at 17:04

## 2022-07-20 ASSESSMENT — PAIN SCALES - GENERAL
PAINLEVEL_OUTOF10: 0
PAINLEVEL_OUTOF10: 6

## 2022-07-20 ASSESSMENT — PAIN SCALES - WONG BAKER: WONGBAKER_NUMERICALRESPONSE: 2

## 2022-07-20 NOTE — PROGRESS NOTES
[address]   Daily Progress Note    Admit Date:  7/17/2022  HPI:    Chief Complaint   Patient presents with    Shortness of Breath     Seen multiple time for this, \"wants the fluids off is lungs\"  states \"I'm not going home you have to do something\"         Maurisio Knott presented with worsening shortness of breath. Hx of CAD s/p PCI to LAD in 2015, RCA 1/2022, AS s/p TAVR 2019, ICM, s/d CHF, A. Fib (Eliquis), HYPERTENSION, HLD, DM, PAD s/p PTA R iliac artery, ESRD on HD, chronic anemia, ZAINAB on CPAP, COPD, and hx of CVA. Subjective:  Mr. Nirav Lewis still with shortness of breath, denies any chest pain. + congestion/ shortness of breath with lying down.      Objective:   Patient Vitals for the past 24 hrs:   BP Temp Temp src Pulse Resp SpO2 Weight   07/20/22 0914 -- -- -- 80 16 -- --   07/20/22 0846 -- -- -- -- 18 -- --   07/20/22 0815 (!) 150/81 97.6 °F (36.4 °C) Oral 83 18 97 % --   07/20/22 0440 -- -- -- -- -- -- 244 lb 11.4 oz (111 kg)   07/20/22 0340 (!) 172/73 97.7 °F (36.5 °C) Oral 78 18 98 % --   07/20/22 0001 (!) 161/76 97.7 °F (36.5 °C) Oral 77 18 95 % --   07/19/22 2341 -- -- -- -- 17 -- --   07/19/22 2001 -- -- -- -- -- 99 % --   07/19/22 2000 (!) 170/74 97.8 °F (36.6 °C) Oral 76 18 100 % --   07/19/22 1632 (!) 148/84 98.1 °F (36.7 °C) Oral 71 16 97 % --   07/19/22 1146 (!) 160/80 -- -- 79 -- 98 % --   07/19/22 1140 -- 97.8 °F (36.6 °C) Oral -- 18 -- --         Intake/Output Summary (Last 24 hours) at 7/20/2022 1135  Last data filed at 7/20/2022 1057  Gross per 24 hour   Intake 720 ml   Output 0 ml   Net 720 ml       Wt Readings from Last 3 Encounters:   07/20/22 244 lb 11.4 oz (111 kg)   07/08/22 232 lb 12.9 oz (105.6 kg)   07/04/22 242 lb (109.8 kg)         ASSESSMENT:   HFrEF: elevated BNP (chronic), shortness of breath, fluid overloaded  CAD: hx PCI, recent LHC with moderate to severe in-stent stenosis, medical management recommended, on Plavix, beta blocker, statin, nitrate  ICM: EF 20-25%, on Entresto, Toprol  AS/ TAVR: 2019, stable by recent echo  AFIB: currently SR, anticoagulation with Elliquis  HYPERTENSION: remains sub-optimal, often comes in with hypertensive emergency, likely related to med non-compliance  ESRD: HD/ UF per nephrology  DM2  PAD  ZAINAB  Hx CVA      PLAN:  Continue Entresto to 49/51 mg bid   Increase Toprol 100 mg bid  Fluid management per HD/ nephrology - agree with driving dry weight lower  Continue Eliquis for PAF  Continue Plavix and statin for CAD/ recent PCI  Continue nitrate for CAD/ angina  Daily weights, labs    Claudia Sequeira, APRN - CNP, 7/20/2022, 11:35 AM  [address]   [phone]       Telemetry: SR , pair PVC's  NYHA: III    Physical Exam:  General:  Awake, alert, NAD  Skin:  Warm and dry  Neck:  JVP difficult to assess  Chest: Diminished posteriorly bilaterally  Cardiovascular:  RRR, normal S1S2, + murmur, no g/r  Abdomen:  Soft, nontender, +bowel sounds  Extremities:  1+ BLE edema      Medications:    mupirocin   Topical Daily    doxercalciferol  8 mcg IntraVENous Q MWF    sacubitril-valsartan  2 tablet Oral BID    epoetin siddharth-epbx  5,000 Units IntraVENous Q MWF    clopidogrel  75 mg Oral Daily    isosorbide dinitrate  20 mg Oral TID    metoprolol succinate  50 mg Oral BID    pantoprazole  40 mg Oral QAM AC    pravastatin  40 mg Oral Daily    tiotropium-olodaterol  2 puff Inhalation Daily    traZODone  100 mg Oral Nightly    sodium chloride flush  5-40 mL IntraVENous 2 times per day    insulin lispro  0-6 Units SubCUTAneous TID WC    insulin lispro  0-3 Units SubCUTAneous Nightly    apixaban  2.5 mg Oral BID      sodium chloride      dextrose         Lab Data: Lab results independently reviewed and analyzed by myself 7/20/2022    CBC:   Recent Labs     07/19/22  0440 07/20/22  0453   WBC 9.6 9.9   HGB 9.8* 10.1*    235       BMP:    Recent Labs     07/18/22  0509 07/19/22  0440 07/20/22  0453   * 133* 131*   K 4.3 4.1 4.4   CO2 23 25 20*   BUN 49* 43* 57*   CREATININE 5.5* 4.3* 6.3*       INR:    No results for input(s): INR in the last 72 hours. BNP:    No results for input(s): PROBNP in the last 72 hours. Cardiac Enzymes:   No results for input(s): TROPONINI in the last 72 hours. Lipids:   Lab Results   Component Value Date/Time    TRIG 76 07/17/2022 05:46 AM    TRIG 213 03/18/2022 06:57 AM    HDL 52 07/17/2022 05:46 AM    HDL 38 03/18/2022 06:57 AM    LDLCALC 80 07/17/2022 05:46 AM    LDLCALC 86 03/18/2022 06:57 AM    LDLDIRECT 199 09/04/2014 10:33 AM    LDLDIRECT 172 03/07/2014 09:37 AM       Cardiac Imaging:   CARDIAC CATH 6/7/22:   FINDINGS   HEMODYNAMICS   CHAMBER PRESSURE SATURATION   RA  10 60%   RV  47/9     PA  50/20  67%   PW  19     AORTA  101/50  95%      NANCY CARDIAC OUTPUT  5.8 L/min   SVR  756    PVR  234       LVGRAM   LVEDP  21   GRADIENT ACROSS AORTIC VALVE  less than 5 mmHg   LV FUNCTION EF 20%   WALL MOTION  severe global hypokinesis with regional variation   MITRAL REGURGITATION  mild      CORONARY ARTERIES   LM Less than 10% ylepupci-afa-clwtgf stenosis            LAD Moderately calcified, 20 to 30% proximal-mid stenosis, distal vessel tapers at the apex with 60% diffuse disease. D1 has an ostial/proximal 75 to 80% stenosis. LCX  Calcified, proximal-mid 75 to 80% stenosis. Distal vessel is tortuous with 70 to 85% diffuse stenosis with more discrete stenosis noted distally. OM 1 is a very small vessel with ostial/proximal 80% stenosis and 60 to 70% diffuse disease in the qizesuff-djm-xreyzx vessel. Bonne Major RCA Dominant, calcified, tortuous, there is a proximal-mid stent with 60 to 70% in-stent restenosis.   Distal vessel 20 to 30% stenosis      CONCLUSIONS:   Mild to moderately increased filling pressures  Patent RCA stent with moderate to borderline severe in-stent restenosis  Severe circumflex and D1 stenosis  Continue medical management, consider outpatient high risk PCI of above territories pending outpatient clinical follow-up. Currently medical management seems best given comorbidities and need for additional fluid removal.  If PCI is pursued, will likely need general anesthesia. Patient had difficulty lying still on Cath Lab table. Okay to resume Eliquis tomorrow, case/plan/findings discussed with patient and wife, they understand/agree, they stated it is incorrectly stated in chart the patient would refuse blood, he is okay to receive blood products if needed. Continue beta-blocker and Entresto     No further inpatient cardiac work-up or treatment plan, will sign off, please call questions       ECHO 6/3/22:    Summary    Definity® used for myocardial border enhancement. Left ventricular systolic function is severely reduced with a visually estimated ejection fraction of 20-25 %. EF estimated by Jasmine's method at 24 %. The left ventricle is mildly dilated in size with normal wall thickness. Severe global hypokinesis with regional variation. Grade I diastolic dysfunction with normal LV pressure. There is no evidence of a left ventricular thrombus. Right ventricular systolic function is reduced. A 29 mm Langford Leyda S3 bioprosthetic aortic valve appears well seated with normal Doppler values. Inadequate tricuspid valve regurgitation to estimate systolic pulmonary artery pressure. There is a moderate left pleural effusion noted. Sameer Orlando nuclear stress test 6/2/2022:   Severe LV dysfunction EF 26%    Global hypokinesis with regional variation, more pronounced in inferolateral    wall and apex    Large mainly fixed defect in inferior wall, extends to portions of lateral    wall and apex with moderate darrick-infarct ischemia        Overall, this would be considered an abnormal, higher risk, study          Coronary angiogram 1/14/2022:  1. Left heart catheterization  2. Selective left and right coronary angiogram  3.  PCI of the RCA with PATRICIA  Procedure Findings:  1. 99% mid RCA  2. 60-70% CIRC and DIAG  3. Elevated left heart hemodynamics              ~LVEDP 30       Details:              Mitchell Kumari was brought to the cardiac catheterization lab in a fasting state after informed consent was obtained. If the patient was able to provide written consent, it was obtained. The patient's vitals were monitored through out the procedure. The patient was sedated using the appropriate levels of sedation and ASA guidelines. The appropriate access site area was prepped and drapped in a sterile fashion. The area was anesthetized with 2% lidocaine. Using the modified Seldinger technique, an arterial sheath was introduced into the arterial access site using a single anterior wall puncture. The sheath was flushed and prepped in usual fashion. Catheters used during the procedure included 5 Macedonian TIG 4.0 catheter. The catheters were advanced and removed over a .035\" wire, into the appropriate positions. Multiple angiographic views were obtained. An LV gram was not obtained. ~The interventional portion of the procedure was completed utilizing a multipurpose 6 [place] guide. Multipurpose guide was advanced into the right coronary ostium. A gentleman therapy aspirin, Plavix, Angiomax was initiated. A BMW wire was advanced into the distal right coronary artery. The lesion was initially predilated with a 2.75 x 15 mm Euphora balloon followed by a 3.5 mm x 20 mm Euphora balloon and subsequently stented with a resolute Willam 4.0 mm x 38 mm balloon. We did experience a no reflow phenomenon. We separately done. A twin pass catheter and then did local drug delivery with 200 mcg of nitroprusside and 200 mcgnitroglycerin. Provided restoration of SCARLETT-3 flow. Findings:  1. Left main coronary artery was normal. It gave off the left anterior descending artery and left circumflex.   2. Left anterior descending artery has 60% severe atherosclerotic disease. It was moderate in size. It gave off septal perforators and a moderate sized diagonal branch. The LAD covered the entire apex of the left ventricle. 3. Left circumflex has 60% severe atherosclerotic disease. It was moderate in size. There was a moderate sized obtuse marginal branch. 4. Right coronary artery has 99% severe atherosclerotic disease. It was moderate in size and was the dominant artery. CONCLUSIONS:  1. Apercutaneous coronary intervention of the right coronary artery utilizing a single resolute Victoria drug-eluting stent  ASSESSMENT/RECOMMENDATIONS:  1. Dual antiplatelet therapy  2. Medical management of the remaining coronary disease     Echo 1/2022:   Limited only f/u for LVEF and RVF. The left ventricular systolic function is mildly reduced with an ejection   fraction of 40-45 %. There is hypokinesis of the apex and inferior walls. There is a very small circumferential pericardial effusion noted. No tamponade physiology. The right ventricle is normal in size and function. Echo 7/9/2020:  Low Normal systolic function with an estimated ejection fraction of 50%. Left ventricular function and wall motion is difficult to estimate due to   poor endocardial visualization. Grade I diastolic dysfunction with normal filing pressure. Normally functioning TAVR 29 mm Langford Leyda S3 bioprosthetic valve in   aortic position appears well seated with a max velocity of 2.11 m/sec,   maximum gradient of 18 mmHg and a mean gradient of 8 mmHg. There is no evidence of aortic regurgitation    Coronary angiogram 8/22/2019:  LM <20%  LAD patent stent  Cx <20%  RCA 30%  Severe AS by echo  Proceed w/ TAVR    Echo 1/24/2017:  Summary   Left ventricular systolic function is normal with an ejection fraction of   55-60%. No wall motion abnormalities noted. Mild concentric left ventricular hypertrophy.    Grade II diastolic dysfunction with elevated filling pressure. Mildly dilated left atrium. Moderate aortic stenosis. Mild aortic regurgitation. Compared to previous study from 10/27/2016, aortic stenosis has not   progressed. R/LHC 1/23/2017:  LM <20%  LAD 30% w/ patent stent  Cx 20-30%  RCA 20-30%  LVEDP 19  RA 8, RV 41/10, PA 45/14/31, W 15      Nonobstructive CAD  Mild elevated right heart pressures        Echo Oct 2016:   Normal left ventricle size with mild concentric left ventricular hypertrophy. Normal systolic function with an estimated ejection fraction of 55%. No regional wall motion abnormalities are seen. Elevated left ventricular diastolic filling pressure ( E/e' 21 ). The aortic valve is thickened/calcified with decreased leaflet mobility. Cannot exclude a bicuspid valve. The aortic valve area is calculated at 1.0 cm2 with a max velocity of 3.36 m/sec, maximum pressure gradient of 45 mmHg and a mean pressure gradient of 23 mmHg. This is c/w moderate aortic stenosis. Color flow demonstrates eccentric jet suggesting mild aortic regurgitation. Trace mitral and tricuspid regurgitation. Systolic pulmonary artery pressure (SPAP) is normal and estimated at 22 mmHg (RA pressure 3 mmHg). There is mild concentric left ventricular hypertrophy. MPI 6/18/15: There is a very small and mild fixed posterior-basal defect consistent with  fibrosis. There is no evidence for ischemia. Left ventricular function is reduced with and estimated LVEF of 40%. The LV is severely dilated. CATH: 5/26/15, Dr. Campuzano Handing  LM <20%   LAD 70% mid.  FFR 0.77  D1 50% prox  Cx 20%  RCA 20%  LVEDP 22mmHg  RA 9, RV 42/10, PA 44/16/31, W 18  PA sat 63%    PTCA LAD  3.0 x 15 PATRICIA  prox portion post 3.5 x 8 NC balloon    DAPT, statin  Resume lasix and ARB at discharge provided Cr stable  Renal fxn will not tolerate higher dose of lasix than 40 daily  Needs smoking cessation, weight loss, exercise  Rec OP sleep eval Negative

## 2022-07-20 NOTE — DISCHARGE INSTRUCTIONS
FOLLOW-UP APPOINTMENTS    [facility]ICE - Follow-up appointment on September 1st at 2:15pm with Mela Cruz CNP, [facility]. You and your one visitor will need to have your mouth and nose covered  with a mask. No children please. [facility],  [facility], [address] [facility] [address], [address] [facility], [address] [place] [address]. Office #: [phone]. If you are unable to make this appointment, please call to reschedule. Directions to [facility]  [facility] towards [place]. [address] [address] exit. Right off exit. Cross over [employer]. Right on [address]. Left into hospital. Follow the signs to the emergency room ( turn left toward the Emergency room). Go right at the first stop sign. Just past the Emergency room at the second stop sign turn right and go up the ramp and park on the top level if possible. Go in the glass doors of the [facility] on the top level of the garage [address]. As soon as you get in the door turn left and our office is the one with the glass doors.  '    PODIATRY  Apply mupirocin and dry dressing to right great toe and right lower leg daily.    If any signs of infection to call office [phone]

## 2022-07-20 NOTE — PROGRESS NOTES
[facility] Progress Note           Subjective/   45y.o. year old male who we are seeing in consultation for ESKD on HD. HPI:  Last HD on 7/18 with 3 liters removed, post-weight of 105.7kg. For HD today      ROS:  Intake adequate, +weak.   On O2    SOC: no visitors   Objective/   GEN:  Chronically ill, BP (!) 150/81   Pulse 83   Temp 97.6 °F (36.4 °C) (Oral)   Resp 18   Ht 5' 9\" (1.753 m)   Wt 244 lb 11.4 oz (111 kg)   SpO2 97%   BMI 36.14 kg/m²   HEENT: non-icteric, no JVD  CV: S1, S2 without m/r/g; trace LE edema  RESP: CTA B without w/r/r; breathing wnl  ABD: +bs, soft, nt, no hsm  SKIN: warm, no rashes  ACCESS: Left IJ [facility]    Data/  Recent Labs     07/19/22  0440 07/20/22  0453   WBC 9.6 9.9   HGB 9.8* 10.1*   HCT 29.2* 30.7*   MCV 88.3 88.9    235       Recent Labs     07/18/22  0509 07/19/22  0440 07/20/22  0453   * 133* 131*   K 4.3 4.1 4.4   CL 92* 93* 94*   CO2 23 25 20*   GLUCOSE 310* 209* 238*   PHOS 5.3* 4.7 5.4*   MG 1.80 1.70* 1.90   BUN 49* 43* 57*   CREATININE 5.5* 4.3* 6.3*   LABGLOM 11* 14* 9*   GFRAA 13* 17* 11*         Assessment/Plan     #ESKD on HD Monday Wednesday Friday at [facility]               Target weight on discharge on 7/9 from hospital was 106 kg               Urgent dialysis on 7/17 and 7/18 with 4 / 3L UF, last post weight 105.7kg  -UF as tolerated today, with crit monitoring  -DAVON, Hectorol-with HD  #Acute on chronic respiratory failure on BiPAP and now weaned off to nasal cannula    #Hyponatremia from volume overload    #Anemia - DAVON with HD    #CKD/MBD               Patient on Hectorol 8 mcg as outpatient    #Hypertension, poorly controlled, better with UF with HD     #History of CHF with reduced EF, EF around 20 to 25%    #Bicuspid severe aortic stenosis status post TAVR on 10/2019 along with CAD status post RCA stenting in 1/14/2022 and PATRICIA LAD on 2015          ____________________________________  Severo Nancy, MD  [facility]  [email or web address]  Office: [phone]

## 2022-07-20 NOTE — PROGRESS NOTES
07/19/22 2341   NIV Type   NIV Started/Stopped On   Equipment Type v60   Mode CPAP   Mask Type Full face mask   Mask Size Large   Settings/Measurements   CPAP/EPAP 10 cmH2O   Resp 17   FiO2  30 %   Vt Exhaled 501 mL   Mask Leak (lpm) 39 lpm   Comfort Level Good   Using Accessory Muscles No   SpO2 99   Breath Sounds   Right Upper Lobe Diminished   Right Middle Lobe Diminished   Right Lower Lobe Diminished   Left Upper Lobe Diminished   Left Lower Lobe Diminished   Alarm Settings   Alarms On Y   Low Pressure 6 cmH2O   High Pressure  30 cmH2O

## 2022-07-20 NOTE — FLOWSHEET NOTE
07/19/22 2000   Assessment   Charting Type Shift assessment   Psychosocial   Psychosocial (WDL) X   Patient Behaviors Non-compliant; Withdrawn; Not interactive   Neurological   Neuro (WDL) WDL   Level of Consciousness Alert (0)   Orientation Level Oriented X4   Cognition Appropriate judgement   Speech Clear   RLE Sensation  Full sensation   LLE Sensation  Full sensation   R Pupil Size (mm) 2   R Pupil Shape Round   R Pupil Reaction Brisk   L Pupil Size (mm) 2   L Pupil Shape Round   L Pupil Reaction Brisk   Robert Coma Scale   Eye Opening 4   Best Verbal Response 5   Best Motor Response 6   Robert Coma Scale Score 15   HEENT (Head, Ears, Eyes, Nose, & Throat)   HEENT (WDL) X   Right Eye Impaired vision   Left Eye Impaired vision   Teeth Missing teeth   Respiratory   Respiratory (WDL) X   Subcutaneous Air/Crepitus None   Respiratory Pattern Regular   Respiratory Depth Normal   Respiratory Quality/Effort Unlabored   Chest Assessment Trachea midline; Chest expansion symmetrical   L Breath Sounds Diminished   R Breath Sounds Diminished   Breath Sounds   Right Upper Lobe Diminished   Right Middle Lobe Diminished   Right Lower Lobe Diminished   Left Upper Lobe Diminished   Left Lower Lobe Diminished   Cardiac   Cardiac (WDL) WDL   Cardiac Regularity Regular   Heart Sounds S1, S2   Cardiac Rhythm Sinus rhythm   Rhythm Interpretation   Heart Rate 76   Cardiac Monitor   Alarm Audible Centralized cardiac monitoring   Telemetry Box Number 71   Gastrointestinal   Abdominal (WDL) X   RUQ Bowel Sounds Active   LUQ Bowel Sounds Active   RLQ Bowel Sounds Active   LLQ Bowel Sounds Active   Abdomen Inspection Rounded   GI Symptoms Nausea;Vomiting   Tenderness Soft;Nontender   Genitourinary   Genitourinary (WDL) X   Flank Tenderness RONY   Suprapubic Tenderness RONY   Dysuria (Pain/Burning w/Urination) RONY   Urine Assessment   Urinary Status Anuria   Urine Odor RONY   Peripheral Vascular   Peripheral Vascular (WDL) X   Edema Right lower extremity; Left lower extremity   RLE Edema +1;Pitting   LLE Edema +1;Pitting   Skin Integumentary    Skin Integumentary (WDL) X   Skin Color Pale   Skin Condition/Temp Dry; Warm   Skin Integrity Abrasion   Location scattered   Musculoskeletal   Musculoskeletal (WDL) WDL   RL Extremity Weakness   LL Extremity Weakness   Wound 08/30/21 Toe (Comment  which one) Right Great Toe   Date First Assessed/Time First Assessed: 08/30/21 1524   Present on Hospital Admission: No  Primary Wound Type: Traumatic  Location: Toe (Comment  which one)  Wound Location Orientation: Right  Wound Description (Comments): Great Toe   Wound Etiology Diabetic   Dressing Status Clean;Dry; Intact   Dressing/Treatment Alginate with Ag;Dry dressing;Roll gauze   Offloading for Diabetic Foot Ulcers Offloading ordered   Dressing Change Due 07/20/22   Handoff   Communication Given Shift Handoff   Handoff Given To [address] [place] [address] Received From [facility]

## 2022-07-20 NOTE — PROGRESS NOTES
Occupational Therapy  OT/PT follow up attempted, pt getting ready to  leave floor for dialysis. Will continue to follow per POC.   JOSSIE Salomon/АННА

## 2022-07-20 NOTE — PROGRESS NOTES
Department of Orthopedic Surgery  Dr. Ale Iqbal  Progress Note      SUBJECTIVE: patient resting in bed with no complaints. He just returned from dialysis. OBJECTIVE    Physical    VITALS:  BP (!) 150/66   Pulse 68   Temp 97.7 °F (36.5 °C) (Oral)   Resp 16   Ht 5' 9\" (1.753 m)   Wt 244 lb 11.4 oz (111 kg)   SpO2 96%   BMI 36.14 kg/m²   CONSTITUTIONAL:  awake, alert, cooperative, no apparent distress, and appears stated age  Vascular: Dorsalis pedis and posterior tibial pulses are palpable. CFT less than 3 seconds to each digit. Neuro:  Decreased epicritic sensation is noted to b/l feet. Derm: Abrasion is noted to right lower leg with no signs of infection. The right hallux nail mild incurvation of hallux nail with dried sanginous drainage noted on nail but no signs of infection. Small abrasion is also noted to right hallux proximal nail fold. No purulence noted. Mild incurvation of hallux nail. Hx of contusion right 4th toe with mild dry ecchymosis is noted to right 4th. Nails 1-5th are elongated discolored and thickened. MS: 5/5 MS for all compartments.     Data    CBC with Differential:    Lab Results   Component Value Date/Time    WBC 9.9 07/20/2022 04:53 AM    RBC 3.45 07/20/2022 04:53 AM    HGB 10.1 07/20/2022 04:53 AM    HCT 30.7 07/20/2022 04:53 AM     07/20/2022 04:53 AM    MCV 88.9 07/20/2022 04:53 AM    MCH 29.3 07/20/2022 04:53 AM    MCHC 32.9 07/20/2022 04:53 AM    RDW 16.2 07/20/2022 04:53 AM    SEGSPCT 73.4 05/17/2013 06:50 AM    BANDSPCT 8 03/03/2022 07:41 AM    METASPCT 2 03/03/2022 07:41 AM    LYMPHOPCT 4.7 07/17/2022 02:08 AM    MONOPCT 7.2 07/17/2022 02:08 AM    MYELOPCT 1 03/03/2022 07:41 AM    BASOPCT 0.7 07/17/2022 02:08 AM    MONOSABS 0.9 07/17/2022 02:08 AM    LYMPHSABS 0.6 07/17/2022 02:08 AM    EOSABS 0.3 07/17/2022 02:08 AM    BASOSABS 0.1 07/17/2022 02:08 AM    DIFFTYPE Auto 05/17/2013 06:50 AM     CMP:    Lab Results   Component Value Date/Time     07/20/2022 04:53 AM    K 4.4 07/20/2022 04:53 AM    K 4.3 07/17/2022 02:08 AM    CL 94 07/20/2022 04:53 AM    CO2 20 07/20/2022 04:53 AM    BUN 57 07/20/2022 04:53 AM    CREATININE 6.3 07/20/2022 04:53 AM    GFRAA 11 07/20/2022 04:53 AM    GFRAA >60 05/17/2013 06:50 AM    AGRATIO 1.3 07/17/2022 02:08 AM    LABGLOM 9 07/20/2022 04:53 AM    GLUCOSE 238 07/20/2022 04:53 AM    PROT 7.3 07/17/2022 02:08 AM    PROT 6.7 12/27/2012 04:18 AM    LABALBU 3.2 07/20/2022 04:53 AM    CALCIUM 8.8 07/20/2022 04:53 AM    BILITOT 0.3 07/17/2022 02:08 AM    ALKPHOS 95 07/17/2022 02:08 AM    AST 16 07/17/2022 02:08 AM    ALT 8 07/17/2022 02:08 AM     Current Inpatient Medications    Current Facility-Administered Medications: metoprolol succinate (TOPROL XL) extended release tablet 100 mg, 100 mg, Oral, BID  heparin (porcine) 1000 UNIT/ML injection, , ,   ondansetron (ZOFRAN) injection 4 mg, 4 mg, IntraVENous, Q6H PRN  mupirocin (BACTROBAN) 2 % ointment, , Topical, Daily  doxercalciferol (HECTOROL) injection 8 mcg, 8 mcg, IntraVENous, Q MWF  sacubitril-valsartan (ENTRESTO) 24-26 MG per tablet 2 tablet, 2 tablet, Oral, BID  epoetin siddharth-epbx (RETACRIT) injection 5,000 Units, 5,000 Units, IntraVENous, Q MWF  albuterol sulfate HFA (PROVENTIL;VENTOLIN;PROAIR) 108 (90 Base) MCG/ACT inhaler 2 puff, 2 puff, Inhalation, Q6H PRN  clopidogrel (PLAVIX) tablet 75 mg, 75 mg, Oral, Daily  cyclobenzaprine (FLEXERIL) tablet 5 mg, 5 mg, Oral, TID PRN  ipratropium-albuterol (DUONEB) nebulizer solution 1 ampule, 1 ampule, Inhalation, Q6H PRN  isosorbide dinitrate (ISORDIL) tablet 20 mg, 20 mg, Oral, TID  oxyCODONE-acetaminophen (PERCOCET) 7.5-325 MG per tablet 1 tablet, 1 tablet, Oral, Q6H PRN  pantoprazole (PROTONIX) tablet 40 mg, 40 mg, Oral, QAM AC  pravastatin (PRAVACHOL) tablet 40 mg, 40 mg, Oral, Daily  tiotropium-olodaterol (STIOLTO) 2.5-2.5 MCG/ACT inhaler 2 puff, 2 puff, Inhalation, Daily  traZODone (DESYREL) tablet 100 mg, 100 mg, Oral, Nightly  sodium chloride flush 0.9 % injection 5-40 mL, 5-40 mL, IntraVENous, 2 times per day  sodium chloride flush 0.9 % injection 5-40 mL, 5-40 mL, IntraVENous, PRN  0.9 % sodium chloride infusion, , IntraVENous, PRN  acetaminophen (TYLENOL) tablet 650 mg, 650 mg, Oral, Q6H PRN **OR** acetaminophen (TYLENOL) suppository 650 mg, 650 mg, Rectal, Q6H PRN  prochlorperazine (COMPAZINE) injection 10 mg, 10 mg, IntraVENous, Q6H PRN  hydrALAZINE (APRESOLINE) injection 20 mg, 20 mg, IntraVENous, Q6H PRN  glucose chewable tablet 16 g, 4 tablet, Oral, PRN  dextrose bolus 10% 125 mL, 125 mL, IntraVENous, PRN **OR** dextrose bolus 10% 250 mL, 250 mL, IntraVENous, PRN  glucagon (rDNA) injection 1 mg, 1 mg, IntraMUSCular, PRN  dextrose 5 % solution, 100 mL/hr, IntraVENous, PRN  insulin lispro (HUMALOG) injection vial 0-6 Units, 0-6 Units, SubCUTAneous, TID WC  insulin lispro (HUMALOG) injection vial 0-3 Units, 0-3 Units, SubCUTAneous, Nightly  labetalol (NORMODYNE;TRANDATE) injection 10 mg, 10 mg, IntraVENous, Q4H PRN  heparin (porcine) injection 3,600 Units, 3,600 Units, IntraCATHeter, PRN  apixaban (ELIQUIS) tablet 2.5 mg, 2.5 mg, Oral, BID    ASSESSMENT AND PLAN  :  This is a 45year old male: 1. Abrasion of right great toe - continue  mupirocin and dsd to toe daily  2. Abrasion of right lower leg- continue mupirocin and dsd  daily  3. Onychocryptosis right hallux tibial border- Debrided hallux nail in length and thickness with slant back of nail to help prevent infection. Possibly could need partial permanent matrixectomy in future which would be done out patient if francis continues to ingrow. Recommend diabetic foot care in our office out patient. 3.  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  CHF

## 2022-07-20 NOTE — FLOWSHEET NOTE
07/20/22 1425 07/20/22 1725   Vital Signs   BP (!) 152/71 (!) 140/68   Heart Rate 71 72   Resp 18 18   Weight 264 lb 8.8 oz (120 kg)  (pt refused stand up scale) 257 lb 8 oz (116.8 kg)   Weight Method  --  Bed scale  (pt refused stand up scale, states legs hurting today)   Post-Hemodialysis Assessment   Patient Disposition  --  Return to room     Treatment time: 3hr    Net UF: 3.4L    Pre weight: 120kg ( pt refused stand up scale)  Post weight: 116.8kg  EDW: TBD    Access used:  [address]  Access function: Good    Medications or blood products given: Hectorol 8 mcg, Retacrit 5,000 units    Regular outpatient schedule: [facility]    Summary of response to treatment: Good    Copy of dialysis treatment record placed in chart, to be scanned into EMR.

## 2022-07-20 NOTE — FLOWSHEET NOTE
07/20/22 1958   Assessment   Charting Type Shift assessment   Psychosocial   Psychosocial (WDL) X   Patient Behaviors Non-compliant; Withdrawn; Not interactive   Neurological   Neuro (WDL) WDL   Level of Consciousness Alert (0)   Orientation Level Oriented X4   Cognition Appropriate judgement   Speech Clear   RLE Sensation  Full sensation   LLE Sensation  Full sensation   R Pupil Size (mm) 2   R Pupil Shape Round   R Pupil Reaction Brisk   L Pupil Size (mm) 2   L Pupil Shape Round   L Pupil Reaction Brisk   Robert Coma Scale   Eye Opening 4   Best Verbal Response 5   Best Motor Response 6   Robert Coma Scale Score 15   HEENT (Head, Ears, Eyes, Nose, & Throat)   HEENT (WDL) X   Right Eye Impaired vision   Left Eye Impaired vision   Nose Other (comment)   Tongue Other (comment)   Teeth Missing teeth   Respiratory   Respiratory (WDL) X   Subcutaneous Air/Crepitus None   Respiratory Pattern Regular   Respiratory Depth Normal   Respiratory Quality/Effort Unlabored   Chest Assessment Trachea midline; Chest expansion symmetrical   L Breath Sounds Diminished   R Breath Sounds Diminished   Breath Sounds   Right Upper Lobe Diminished   Right Middle Lobe Diminished   Right Lower Lobe Diminished   Left Upper Lobe Diminished   Left Lower Lobe Diminished   Cardiac   Cardiac (WDL) WDL   Cardiac Regularity Regular   Heart Sounds S1, S2   Cardiac Rhythm Sinus rhythm   Gastrointestinal   Abdominal (WDL) X   RUQ Bowel Sounds Active   LUQ Bowel Sounds Active   RLQ Bowel Sounds Active   LLQ Bowel Sounds Active   Abdomen Inspection Rounded   GI Symptoms Nausea;Vomiting   Tenderness Soft;Nontender   Genitourinary   Genitourinary (WDL) X   Flank Tenderness RONY   Suprapubic Tenderness RONY   Dysuria (Pain/Burning w/Urination) RONY   Peripheral Vascular   Peripheral Vascular (WDL) X   Edema Right lower extremity; Left lower extremity   Edema Generalized Non-pitting   RLE Edema +1;Pitting   LLE Edema +1;Pitting   Skin Integumentary    Skin Integumentary (WDL) X   Skin Color Pale   Skin Condition/Temp Dry   Skin Integrity Abrasion   Location scattered   Musculoskeletal   Musculoskeletal (WDL) WDL   RL Extremity Weakness   LL Extremity Weakness

## 2022-07-20 NOTE — PROGRESS NOTES
Hospitalist Progress Note      PCP: Vinnie Bernardo MD    Date of Admission: 7/17/2022        Subjective:     Doing better this AM.  Shortness of breath has improved      Medications:  Reviewed    Infusion Medications    sodium chloride      dextrose       Scheduled Medications    metoprolol succinate  100 mg Oral BID    mupirocin   Topical Daily    doxercalciferol  8 mcg IntraVENous Q MWF    sacubitril-valsartan  2 tablet Oral BID    epoetin siddharth-epbx  5,000 Units IntraVENous Q MWF    clopidogrel  75 mg Oral Daily    isosorbide dinitrate  20 mg Oral TID    pantoprazole  40 mg Oral QAM AC    pravastatin  40 mg Oral Daily    tiotropium-olodaterol  2 puff Inhalation Daily    traZODone  100 mg Oral Nightly    sodium chloride flush  5-40 mL IntraVENous 2 times per day    insulin lispro  0-6 Units SubCUTAneous TID WC    insulin lispro  0-3 Units SubCUTAneous Nightly    apixaban  2.5 mg Oral BID     PRN Meds: ondansetron, albuterol sulfate HFA, cyclobenzaprine, ipratropium-albuterol, oxyCODONE-acetaminophen, sodium chloride flush, sodium chloride, acetaminophen **OR** acetaminophen, prochlorperazine, hydrALAZINE, glucose, dextrose bolus **OR** dextrose bolus, glucagon (rDNA), dextrose, labetalol, heparin (porcine)      Intake/Output Summary (Last 24 hours) at 7/20/2022 1554  Last data filed at 7/20/2022 0971  Gross per 24 hour   Intake 720 ml   Output 0 ml   Net 720 ml         Physical Exam Performed:    BP (!) 150/66   Pulse 68   Temp 97.7 °F (36.5 °C) (Oral)   Resp 16   Ht 5' 9\" (1.753 m)   Wt 244 lb 11.4 oz (111 kg)   SpO2 96%   BMI 36.14 kg/m²     General appearance: No apparent distress, appears stated age and cooperative. HEENT: Pupils equal, round, and reactive to light. Conjunctivae/corneas clear. Neck: Supple, with full range of motion. No jugular venous distention. Trachea midline. Respiratory:  Normal respiratory effort.  Clear to auscultation, bilaterally without Rales/Wheezes/Rhonchi. Cardiovascular: Regular rate and rhythm with normal S1/S2 without murmurs, rubs or gallops. Abdomen: Soft, non-tender, non-distended with normal bowel sounds. Musculoskeletal: No clubbing, cyanosis or edema bilaterally. Full range of motion without deformity. Skin: Skin color, texture, turgor normal.  No rashes or lesions. Neurologic:  Neurovascularly intact without any focal sensory/motor deficits. Cranial nerves: II-XII intact, grossly non-focal.  Psychiatric: Alert and oriented, thought content appropriate, normal insight  Capillary Refill: Brisk,3 seconds, normal   Peripheral Pulses: +2 palpable, equal bilaterally       Labs:   Recent Labs     07/19/22  0440 07/20/22  0453   WBC 9.6 9.9   HGB 9.8* 10.1*   HCT 29.2* 30.7*    235       Recent Labs     07/18/22  0509 07/19/22  0440 07/20/22  0453   * 133* 131*   K 4.3 4.1 4.4   CL 92* 93* 94*   CO2 23 25 20*   BUN 49* 43* 57*   CREATININE 5.5* 4.3* 6.3*   CALCIUM 9.0 8.5 8.8   PHOS 5.3* 4.7 5.4*       No results for input(s): AST, ALT, BILIDIR, BILITOT, ALKPHOS in the last 72 hours. No results for input(s): INR in the last 72 hours. No results for input(s): Angelika Cuetoarch in the last 72 hours. Urinalysis:      Lab Results   Component Value Date/Time    NITRU Negative 05/29/2022 12:51 PM    WBCUA 10-20 05/29/2022 12:51 PM    BACTERIA 3+ 05/29/2022 12:51 PM    RBCUA 5-10 05/29/2022 12:51 PM    BLOODU TRACE-INTACT 05/29/2022 12:51 PM    SPECGRAV 1.015 05/29/2022 12:51 PM    GLUCOSEU 100 05/29/2022 12:51 PM       Radiology:  XR CHEST PORTABLE   Final Result   Left basilar opacification and pleural effusion are redemonstrated. Pulmonary vascular congestion with interstitial and perihilar opacities   suggesting edema has mildly increased. Assessment/Plan:    -Acute on chronic systolic heart failure, EF 20 to 25%--continue volume removal with hemodialysis, beta-blocker, nitrate, Entresto. Grace Monzon Follow-up with cardiology  -ESRD on hemodialysis with volume overload--continue hemodialysis per nephrology-continue hemodialysis schedule Monday, Wednesday and Friday  -. Moderate left-sided pleural effusion with above--no intervention per pulmonology--continue volume of hemodialysis  -Acute on chronic respiratory failure with hypoxia-required up to 5 L nasal cannula, weaned down to 2 L which is his baseline--c  -Hypertensive emergency presentation--/110 with decompensated heart failure. ... BP meds were adjusted. Continue to monitor  -History of severe AS s/p TAVR 2019  -Paroxysmal atrial fibrillation-in sinus rhythm--continue Eliquis, metoprolol  -Obstructive sleep apnea --continue CPAP at bedtime  -Peripheral vascular disease s/p right iliac angioplasty  -Chronic anemia dt ESRD--continue to monitor H&H  -Right great toe wound--follow-up with podiatry  -Hyponatremia--hypervolemic--mild. Continue with hemodialysis and monitor  -Nausea and vomiting 7/19--- resolved  -Documented noncompliance with treatment--- advised on need to take medications as prescribed    DVT Prophylaxis: Eliquis  Diet: ADULT DIET; Regular; Low Potassium (Less than 3000 mg/day)  Code Status: Full Code    PT/OT Eval Status:   Disposition. Grace Monzon   Discharge to SNF once approved      Haja Soares MD

## 2022-07-20 NOTE — CONSULTS
[address] 53 1970    History:  Past Medical History:   Diagnosis Date    Ambulatory dysfunction     walker for long distances, SOB with distance    Aortic stenosis     echo 2017    Arthritis     hands and hips    Asthma     Bilateral hilar adenopathy syndrome 6/3/2013    CAD (coronary artery disease)     Dr. Gregorio Garcia [facility]) 04/19/2019    EF= 43%    CHF (congestive heart failure) ([address]     Chronic pain     COPD (chronic obstructive pulmonary disease) ([address]     pulmonology Dr. Gunderson Boards    Depression     Diabetes mellitus ([address]     borderline    Difficult intravenous access     Emphysema of lung ([address]     ESRD (end stage renal disease) on dialysis ([address]     MWF    Fear of needles     Gastric ulcer     GERD (gastroesophageal reflux disease)     Heart valve problem     bicuspic valve    Hemodialysis patient ([address]     History of spinal fracture     work incident    Hx of blood clots     Bilateral lower extremities; stents in place    Hyperlipidemia     Hypertension     MI (myocardial infarction) ([address] 2019    has had 9 MIs. 2019 was the last    Neuromuscular disorder ([address]     due to CVA    Numbness and tingling in left arm     from fistula    Pneumonia     PONV (postoperative nausea and vomiting)     Prolonged emergence from general anesthesia     States requires more medication than most people    Sleep apnea     Uses CPAP    Stroke ([address]     7mm thalamic cva 2017 deficts left side, left side weakness    TIA (transient ischemic attack)     Unspecified diseases of blood and blood-forming organs        ECHO:  6/3/22  EF 20-25%  HgA1C: 6/21/22  6.2  Iron Saturation:  6/23/22   23  Ferritin:  1/12/22  624.0    ACE/ARB: Entresto 24-26mg 2tablets BID    BB: Toprol  mg BID    Isordil 20 mg TID    Last Hospital Admission:  7/5/22  acute on chronic respiratory failure  Discharge plans: [facility]     Family Present: none  Advanced Directives: patient has advance directives scanned in the chart  Patient's stated goal of care: be more comfortable prior to discharge; long term goals include preventing hospitalization  Lifestyle goal discussed: compliance, diet and fluid restrictions    Patient sitting in bed at this time on room air. Pt denies shortness of breath; no complaints of chest pain. Pt states he lives at home with wife. Mentioned his diet largely consists of low sodium. Pt states he drinks less than 64ounces of fluid per day. States he takes all his home medications as prescribed. Patient has a scale at home. Patient denies concerns paying for medications. Met with patient at bedside. Pt states that he is going to a SNF this time due to frequent hospitalizations. Pt states that he is compliant with diet and fluid restrictions however pt has just finished eating ONO[employer] (?????) HSPTL. Plans are to discharge to [facility] at discharge where they will monitor for s/s of CHF including daily weights. They will ensure pt complies with HD needs as well as dietary and fluid restrictions. Patient recent weights and intake/output reviewed:    Patient Vitals for the past 96 hrs (Last 3 readings):   Weight   07/20/22 0440 244 lb 11.4 oz (111 kg)   07/19/22 0808 242 lb 14.6 oz (110.2 kg)   07/18/22 1817 233 lb 0.4 oz (105.7 kg)         Intake/Output Summary (Last 24 hours) at 7/20/2022 1430  Last data filed at 7/20/2022 0979  Gross per 24 hour   Intake 720 ml   Output 0 ml   Net 720 ml       Notified patient to call the doctor post discharge if he experiences shortness of breath, chest pain, swelling, cough, or weight gain of 2-3 pounds in a day / 5 pounds in a week. Also notified patient to call the doctor if he feels dizzy, increased fatigue, decreased or difficulty urinating. Reviewed the red, yellow, green zones of heart failure self management.  Encouraged patient to call the MD with early signs of an acute heart failure exacerbation or when in the yellow zone. Patient provided with both written and verbal education on CHF signs/symptoms, causes, discharge medications, daily weights, low sodium diet, activity, fluid restriction, and follow-up. Pt verbalized understanding. No additional questions at this time. Stated he will alert his nurse with any questions. PATIENT/CAREGIVER TEACHING:    Level of patient/caregiver understanding able to:   [ ] Verbalize understanding [ ] Demonstrate understanding [ ] Teach back   [ ] Needs reinforcement [ ] Other:     Education Time: 10 min    Recommendations:   1. Encourage follow-up appointment compliance. 2. Educate further on fluid restriction 48 oz - 64 oz during inpatient stay so he can understand how to measure intake at home. 3. Review sodium restrictions. Encourage patient to not add table salt to his foods and avoid foods that are high in sodium. 4. Continue to educate on S/S. Stress the importance of calling the MD with the earliest signs of an acute exacerbation. 5. Emphasize daily weights - instruct patient to call the MD if he gains 2-3 lb in a day or 5 lb in a week. 6. Provided patient with CHF Resource Line for questions and concerns.        Angelica Heath RN   7/20/2022 2:30 PM

## 2022-07-20 NOTE — CARE COORDINATION
Spoke with MD who states patient is now agreeable with SNF. Discussed with patient and he confirmed this. He would like a referral to be made to [facility]. Placed call to [facility] with [facility] and notified of referral.  She states they are able to accept and will be able to transport to and from dialysis at [facility]. She is initiating precert at this time.

## 2022-07-21 VITALS
BODY MASS INDEX: 38.14 KG/M2 | DIASTOLIC BLOOD PRESSURE: 77 MMHG | OXYGEN SATURATION: 96 % | SYSTOLIC BLOOD PRESSURE: 144 MMHG | HEART RATE: 71 BPM | HEIGHT: 69 IN | TEMPERATURE: 98.1 F | RESPIRATION RATE: 16 BRPM | WEIGHT: 257.5 LBS

## 2022-07-21 LAB
ALBUMIN SERPL-MCNC: 3.3 G/DL (ref 3.4–5)
ANION GAP SERPL CALCULATED.3IONS-SCNC: 13 MMOL/L (ref 3–16)
BUN BLDV-MCNC: 53 MG/DL (ref 7–20)
CALCIUM SERPL-MCNC: 8.7 MG/DL (ref 8.3–10.6)
CHLORIDE BLD-SCNC: 94 MMOL/L (ref 99–110)
CO2: 25 MMOL/L (ref 21–32)
CREAT SERPL-MCNC: 5.9 MG/DL (ref 0.9–1.3)
GFR AFRICAN AMERICAN: 12
GFR NON-AFRICAN AMERICAN: 10
GLUCOSE BLD-MCNC: 191 MG/DL (ref 70–99)
GLUCOSE BLD-MCNC: 206 MG/DL (ref 70–99)
GLUCOSE BLD-MCNC: 233 MG/DL (ref 70–99)
HCT VFR BLD CALC: 29.7 % (ref 40.5–52.5)
HEMOGLOBIN: 9.9 G/DL (ref 13.5–17.5)
MAGNESIUM: 1.8 MG/DL (ref 1.8–2.4)
MCH RBC QN AUTO: 29.4 PG (ref 26–34)
MCHC RBC AUTO-ENTMCNC: 33.4 G/DL (ref 31–36)
MCV RBC AUTO: 88.2 FL (ref 80–100)
PDW BLD-RTO: 16 % (ref 12.4–15.4)
PERFORMED ON: ABNORMAL
PERFORMED ON: ABNORMAL
PHOSPHORUS: 4.9 MG/DL (ref 2.5–4.9)
PLATELET # BLD: 209 K/UL (ref 135–450)
PMV BLD AUTO: 7.9 FL (ref 5–10.5)
POTASSIUM SERPL-SCNC: 4.3 MMOL/L (ref 3.5–5.1)
RBC # BLD: 3.37 M/UL (ref 4.2–5.9)
SODIUM BLD-SCNC: 132 MMOL/L (ref 136–145)
WBC # BLD: 9.2 K/UL (ref 4–11)

## 2022-07-21 PROCEDURE — 85027 COMPLETE CBC AUTOMATED: CPT

## 2022-07-21 PROCEDURE — 2700000000 HC OXYGEN THERAPY PER DAY

## 2022-07-21 PROCEDURE — 6370000000 HC RX 637 (ALT 250 FOR IP): Performed by: NURSE PRACTITIONER

## 2022-07-21 PROCEDURE — 97110 THERAPEUTIC EXERCISES: CPT

## 2022-07-21 PROCEDURE — 94660 CPAP INITIATION&MGMT: CPT

## 2022-07-21 PROCEDURE — 80069 RENAL FUNCTION PANEL: CPT

## 2022-07-21 PROCEDURE — 83735 ASSAY OF MAGNESIUM: CPT

## 2022-07-21 PROCEDURE — 83036 HEMOGLOBIN GLYCOSYLATED A1C: CPT

## 2022-07-21 PROCEDURE — 94761 N-INVAS EAR/PLS OXIMETRY MLT: CPT

## 2022-07-21 PROCEDURE — 99232 SBSQ HOSP IP/OBS MODERATE 35: CPT | Performed by: NURSE PRACTITIONER

## 2022-07-21 PROCEDURE — 36415 COLL VENOUS BLD VENIPUNCTURE: CPT

## 2022-07-21 PROCEDURE — 6370000000 HC RX 637 (ALT 250 FOR IP): Performed by: INTERNAL MEDICINE

## 2022-07-21 PROCEDURE — 2580000003 HC RX 258: Performed by: NURSE PRACTITIONER

## 2022-07-21 PROCEDURE — 97530 THERAPEUTIC ACTIVITIES: CPT

## 2022-07-21 PROCEDURE — 97535 SELF CARE MNGMENT TRAINING: CPT

## 2022-07-21 RX ORDER — OXYCODONE AND ACETAMINOPHEN 7.5; 325 MG/1; MG/1
1 TABLET ORAL EVERY 6 HOURS PRN
Qty: 20 TABLET | Refills: 0 | Status: SHIPPED | OUTPATIENT
Start: 2022-07-21 | End: 2022-08-05 | Stop reason: SDUPTHER

## 2022-07-21 RX ORDER — CYCLOBENZAPRINE HCL 5 MG
TABLET ORAL
DISCHARGE
Start: 2022-07-21

## 2022-07-21 RX ORDER — METOPROLOL SUCCINATE 100 MG/1
100 TABLET, EXTENDED RELEASE ORAL 2 TIMES DAILY
Qty: 30 TABLET | Refills: 3 | DISCHARGE
Start: 2022-07-21

## 2022-07-21 RX ADMIN — SACUBITRIL AND VALSARTAN 2 TABLET: 24; 26 TABLET, FILM COATED ORAL at 09:31

## 2022-07-21 RX ADMIN — APIXABAN 2.5 MG: 2.5 TABLET, FILM COATED ORAL at 09:31

## 2022-07-21 RX ADMIN — CLOPIDOGREL BISULFATE 75 MG: 75 TABLET ORAL at 09:31

## 2022-07-21 RX ADMIN — PRAVASTATIN SODIUM 40 MG: 40 TABLET ORAL at 09:30

## 2022-07-21 RX ADMIN — ISOSORBIDE DINITRATE 20 MG: 20 TABLET ORAL at 09:31

## 2022-07-21 RX ADMIN — Medication 10 ML: at 09:31

## 2022-07-21 RX ADMIN — OXYCODONE AND ACETAMINOPHEN 1 TABLET: 7.5; 325 TABLET ORAL at 09:31

## 2022-07-21 RX ADMIN — PANTOPRAZOLE SODIUM 40 MG: 40 TABLET, DELAYED RELEASE ORAL at 09:31

## 2022-07-21 RX ADMIN — METOPROLOL SUCCINATE 100 MG: 50 TABLET, EXTENDED RELEASE ORAL at 09:30

## 2022-07-21 NOTE — PROGRESS NOTES
07/20/22 0917   NIV Type   NIV Started/Stopped On   Equipment Type V60   Mode CPAP   Mask Type Full face mask   Mask Size Large   Settings/Measurements   CPAP/EPAP 10 cmH2O   Resp 18   FiO2  30 %   Vt Exhaled 371 mL   Minute Volume 7.3 Liters   Mask Leak (lpm) 44 lpm   Comfort Level Good   Using Accessory Muscles No   Alarm Settings   Alarms On Y

## 2022-07-21 NOTE — CARE COORDINATION
CASE MANAGEMENT DISCHARGE SUMMARY      Discharge to: [address] [place] [address] completed: yes  Hospital Exemption Notification (HENS) completed: yes    IMM given: (date) 7/21/22    Transportation: ambulance    Medical Transport explained to pt/family. Pt/family voice no agency preference. Agency used: [facility]    time: 5pm   Ambulance form completed: Yes    Confirmed discharge plan with: patient/wife/RN/Kandi with [facility]      Patient: yes     Family:  yes    Name: Contact number: Cassandra Gregor [phone]     Facility/Agency, name:  [facility] faxed   Phone number for report to facility: [phone]     RN, name: Skip Schirmer    Note: Discharging nurse to complete [facility], reconcile [facility], and place final copy with patient's discharge packet. RN to ensure that written prescriptions for  Level II medications are sent with patient to the facility as per protocol.

## 2022-07-21 NOTE — DISCHARGE SUMMARY
monitor  -History of severe AS s/p TAVR 2019  -Paroxysmal atrial fibrillation-in sinus rhythm--continue Eliquis, metoprolol  -Obstructive sleep apnea --continue CPAP at bedtime  -Peripheral vascular disease s/p right iliac angioplasty  -Chronic anemia dt ESRD--continue to monitor H&H  -Right great toe wound--follow-up with podiatry  -Hyponatremia--hypervolemic--mild. Continue with hemodialysis and monitor  -Nausea and vomiting 7/19--- resolved  -Documented noncompliance with treatment--- advised on need to take medications as prescribed  -DM type 2,carb controlled in recent past with Hba1c 6/21/22. .     Invasive procedures:  See above    Discharge Diagnoses:   Principal Problem:    Hypertensive emergency  Active Problems:    CHF (congestive heart failure) ([facility] [address]    Coronary artery disease of native artery of native heart with stable angina pectoris (HCC)    Type 2 diabetes, uncontrolled, with neuropathy (HCC)    Acute on chronic combined systolic and diastolic heart failure (HCC)    Asthma-COPD overlap syndrome ([facility] [address]    Former smoker    Cardiomyopathy ([facility] [address]    SOB (shortness of breath)    ZAINAB on CPAP    Pleural effusion    ESRD (end stage renal disease) on dialysis ([facility] [address]  Resolved Problems:    * No resolved hospital problems. *      Physical Exam: BP (!) 144/77   Pulse 71   Temp 98.1 °F (36.7 °C)   Resp 16   Ht 5' 9\" (1.753 m)   Wt 257 lb 8 oz (116.8 kg)   SpO2 96%   BMI 38.03 kg/m²   Gen/overall appearance: Not in acute distress. Alert. Head: Normocephalic, atraumatic  Eyes: EOMI, good acuity  ENT:- Oral mucosa moist  Neck: No JVD, thyromegaly  CVS: Nml S1S2, no MRG, RRR  Pulm: Clear bilaterally. No crackles/wheezes  Gastrointestinal: Soft, NT/ND, +BS  Musculoskeletal: No edema. Warm  Neuro: No focal deficit. Moves extremity spontaneously. Psychiatry: Appropriate affect. Not agitated. Skin: Warm, dry with normal turgor.  No rash        Significant Diagnostic Studies:    See above      Treatments: As above.      Discharge Medications:     Medication List        START taking these medications      mupirocin 2 % ointment  Commonly known as: BACTROBAN  Apply topically 3 times daily. Start taking on: July 22, 2022     sacubitril-valsartan 24-26 MG per tablet  Commonly known as: ENTRESTO  Take 2 tablets by mouth in the morning and 2 tablets before bedtime. CHANGE how you take these medications      cyclobenzaprine 5 MG tablet  Commonly known as: FLEXERIL  Muscle spasms  What changed:   medication strength  See the new instructions.      metoprolol succinate 100 MG extended release tablet  Commonly known as: TOPROL XL  Take 1 tablet by mouth in the morning and at bedtime  What changed:   medication strength  how much to take            CONTINUE taking these medications      albuterol sulfate  (90 Base) MCG/ACT inhaler  Commonly known as: PROVENTIL;VENTOLIN;PROAIR  Inhale 2 puffs into the lungs every 6 hours as needed for Wheezing     Alcohol Swabs Pads  USE AS DIRECTED     apixaban 5 MG Tabs tablet  Commonly known as: ELIQUIS  Take 1 tablet by mouth 2 times daily     blood glucose monitor kit and supplies  USE AS DIRECTED.     calcium acetate 667 MG Caps capsule  Commonly known as: PHOSLO  TAKE 1 CAPSULE BY MOUTH THREE TIMES DAILY WITH MEALS     calcium carbonate 500 MG chewable tablet  Commonly known as: TUMS     clopidogrel 75 MG tablet  Commonly known as: PLAVIX  Take 1 tablet by mouth daily     Dexcom G6  Brit  1 each by Does not apply route Daily with lunch     Dexcom G6 Sensor Misc  Every 10 days     Dexcom G6 Transmitter Misc  1 each by Does not apply route every 3 months     docusate sodium 100 MG capsule  Commonly known as: DOK  Take 1 capsule by mouth daily     DULoxetine 30 MG extended release capsule  Commonly known as: CYMBALTA  TAKE 1 CAPSULE BY MOUTH EVERY DAY     ipratropium-albuterol 0.5-2.5 (3) MG/3ML Soln nebulizer solution  Commonly known as: DuoNeb  Inhale 3 mLs into the lungs every 6 hours as needed for Shortness of Breath     isosorbide dinitrate 20 MG tablet  Commonly known as: ISORDIL  Take 1 tablet by mouth 3 times daily     Linzess 145 MCG capsule  Generic drug: linaclotide  TAKE 1 CAPSULE BY MOUTH IN THE MORNING BEFORE BREAKFAST     mineral oil liquid  Take 30 mLs by mouth daily as needed for Constipation     nitroGLYCERIN 0.4 MG SL tablet  Commonly known as: NITROSTAT  DISSOLVE 1 TABLET UNDER THE TONGUE AS NEEDED FOR CHEST PAIN EVERY 5 MINUTES UP TO 3 TIMES. IF NO RELIEF CALL 911. oxyCODONE-acetaminophen 7.5-325 MG per tablet  Commonly known as: PERCOCET  Take 1 tablet by mouth every 6 hours as needed (pain) for up to 5 days.      pantoprazole 40 MG tablet  Commonly known as: PROTONIX  TAKE 1 TABLET BY MOUTH EVERY MORNING BEFORE BREAKFAST     pravastatin 40 MG tablet  Commonly known as: PRAVACHOL  Take 1 tablet by mouth daily     QUEtiapine 50 MG tablet  Commonly known as: SEROQUEL  TAKE 1 TABLET BY MOUTH EVERY EVENING     sennosides-docusate sodium 8.6-50 MG tablet  Commonly known as: SENOKOT-S  Take 1 tablet by mouth daily     tiotropium-olodaterol 2.5-2.5 MCG/ACT Aers  Commonly known as: Stiolto Respimat  Inhale 2 puffs into the lungs daily     traZODone 100 MG tablet  Commonly known as: DESYREL     Virt-Caps 1 MG Caps  TAKE 1 CAPSULE BY MOUTH EVERY DAY     vitamin D 1.25 MG (92222 UT) Caps capsule  Commonly known as: ERGOCALCIFEROL               Where to Get Your Medications        You can get these medications from any pharmacy    Bring a paper prescription for each of these medications  oxyCODONE-acetaminophen 7.5-325 MG per tablet       Information about where to get these medications is not yet available    Ask your nurse or doctor about these medications  cyclobenzaprine 5 MG tablet  metoprolol succinate 100 MG extended release tablet  mupirocin 2 % ointment  sacubitril-valsartan 24-26 MG per tablet         Activity: activity as tolerated  Diet: ADULT DIET; Regular; 4 carb choices (60 gm/meal); Low Potassium (Less than 3000 mg/day)      Disposition: SNF  Discharged Condition: Stable  Follow Up:   Ayesha Trimble DPM  [id number]    Follow up in 2 week(s)  For wound re-check right great toe and right lower leg        Code status:  Full Code         Total time spent on discharge, finalizing medications, referrals and arranging outpatient follow up was more than 45 minutes      Thank you Dr. Marylin Jensen MD for the opportunity to be involved in this patients care.

## 2022-07-21 NOTE — PROGRESS NOTES
Physical Therapy  Facility/Department: [place] [address] [facility]  Daily Treatment Note  NAME: Alpesh Bush  : 1970  MRN: [id number]    Date of Service: 2022    Discharge Recommendations:  Subacute/Skilled Nursing Facility   PT Equipment Recommendations  Equipment Needed: No  [facility] 6 Clicks Inpatient Mobility:  AM-PAC Mobility Inpatient   How much difficulty turning over in bed?: A Lot  How much difficulty sitting down on / standing up from a chair with arms?: A Little  How much difficulty moving from lying on back to sitting on side of bed?: A Lot  How much help from another person moving to and from a bed to a chair?: A Little  How much help from another person needed to walk in hospital room?: A Little  How much help from another person for climbing 3-5 steps with a railing?: Total  AM-PAC Inpatient Mobility Raw Score : 14  AM-PAC Inpatient T-Scale Score : 38.1  Mobility Inpatient CMS 0-100% Score: 61.29  Mobility Inpatient CMS G-Code Modifier : CL  Patient Diagnosis(es): The primary encounter diagnosis was Acute on chronic congestive heart failure, unspecified heart failure type ([facility] [address] Diagnoses of Hypertensive emergency and Chronic pain syndrome were also pertinent to this visit. Assessment   Assessment: Pt stood 2x, amb rw 5' fwd and 5' bwd and transferred with min x1 and RW. Vc's needed for safe technique in order to reduce fall risk. Demo's decreased activity tolerance. O2 sats 94% RA and HR 68. Cont PT in acute care. Recommend after d/c from hospital.  Activity Tolerance: Patient tolerated treatment well  Equipment Needed: No     Plan    Plan  Plan: 2-3 times per week  Current Treatment Recommendations: Strengthening;Equipment evaluation, education, & procurement;ROM;Balance training;Gait training;Functional mobility training;Stair training;Transfer training;Neuromuscular re-education;Home exercise program;Safety education & training; Endurance training;Patient/Caregiver education & training; Therapeutic activities; Positioning     Restrictions  Restrictions/Precautions  Restrictions/Precautions: Fall Risk, General Precautions, Up as Tolerated  Position Activity Restriction  Other position/activity restrictions: tele     Subjective    Subjective  Subjective: Required some encouragement to participate. Pain: Denies pain. Reports fatigue d/t lack of sleep. Pt was seated EOB upon approach. Orientation  Overall Orientation Status: Within Functional Limits  Orientation Level: Oriented X4  Cognition  Overall Cognitive Status: Exceptions  Arousal/Alertness: Appropriate responses to stimuli  Following Commands: Follows multistep commands consistently  Attention Span: Attends with cues to redirect  Safety Judgement: Decreased awareness of need for safety;Decreased awareness of need for assistance  Problem Solving: Assistance required to correct errors made;Assistance required to identify errors made  Insights: Decreased awareness of deficits  Sequencing: Requires cues for some  Cognition Comment: vc's for safety and insight     Objective   Vitals  Comment: /54, HR 68, O2 sats 94% RA  Bed Mobility Training  Bed Mobility Training:  (Pt seated EOB at start and end of session. Bed alarm turned on)  Balance  Sitting: Intact  Standing: Impaired  Standing - Static: Fair  Standing - Dynamic: Constant support  Transfer Training  Transfer Training: Yes  Sit to Stand: Minimum assistance  Stand to Sit: Minimum assistance  Bed to Chair: Minimum assistance  Gait Training  Gait Training: Yes  Gait  Overall Level of Assistance: Minimum assistance  Interventions: Verbal cues; Safety awareness training  Step Length: Right shortened;Left shortened  Gait Abnormalities: Decreased step clearance  Distance (ft): 5 Feet (5 ft x 2, 3 ft x 2)     PT Exercises  Exercise Treatment: performed BLE TE 10X EACH: AP,  GS, HIP ABD, LAQ, MARCHES       Safety Devices  Type of Devices: Bed alarm in place; All fall risk precautions in place;Gait belt;Nurse notified;Call light within reach (Pt left sitting EOB w/ all needs in reach, RN aware)       Goals  Short Term Goals  Time Frame for Short term goals: 7 days 7/26  Short term goal 1: pt will complete bed mobility with mod ind  -7/21 NT  Short term goal 2: pt will complete functional transfer wiht supv and LRAD.   -7/21 min A rw  Short term goal 3: pt will ambualte 30 ft with LRAD and SBA.    -7/21 5' rw min A  Short term goal 4: pt will tolerate 10-15 reps of BLE seated/supine there x by 7/23 to maintain functional strength.   -7/21 progressing  Patient Goals   Patient goals : \"to go home\"    Education  Patient Education  Education Given To: Patient  Education Provided: Role of Therapy;Plan of Care;Transfer Training;Equipment  Education Provided Comments: role of therapy, poc, safety measures of hospital/ benefits of OOB activity  Education Method: Demonstration;Verbal  Barriers to Learning: None  Education Outcome: Continued education needed;Verbalized understanding;Demonstrated understanding    Therapy Time   Individual Concurrent Group Co-treatment   Time In       0927   Time Out       0950   Minutes       23   Timed Code Treatment Minutes: [address]

## 2022-07-21 NOTE — PROGRESS NOTES
07/21/22 0415   NIV Type   $NIV $Daily Charge   NIV Started/Stopped On   Equipment Type V60   Mode CPAP   Mask Type Full face mask   Mask Size Large   Settings/Measurements   CPAP/EPAP 10 cmH2O   Resp 18   FiO2  30 %   Vt Exhaled 421 mL   Minute Volume 7.4 Liters   Mask Leak (lpm) 50 lpm   Comfort Level Good   Using Accessory Muscles No   Alarm Settings   Alarms On Y

## 2022-07-21 NOTE — PROGRESS NOTES
[facility] Progress Note           Subjective/   45y.o. year old male who we are seeing in consultation for ESKD on HD.      HPI:  HD MWF      ROS:  SOB at baseline  C/O weak legs      SOC: no visitors   Objective/   GEN:  Chronically ill, BP (!) 150/87   Pulse 68   Temp 98.3 °F (36.8 °C)   Resp 18   Ht 5' 9\" (1.753 m)   Wt 257 lb 8 oz (116.8 kg)   SpO2 97%   BMI 38.03 kg/m²   HEENT: non-icteric, no JVD  CV: S1, S2 without m/r/g; trace LE edema  RESP: CTA B without w/r/r; breathing wnl  ABD: +bs, soft, nt, no hsm  SKIN: warm, no rashes  ACCESS: Left IJ [facility] [place]    Data/  Recent Labs     07/19/22  0440 07/20/22  0453 07/21/22  0508   WBC 9.6 9.9 9.2   HGB 9.8* 10.1* 9.9*   HCT 29.2* 30.7* 29.7*   MCV 88.3 88.9 88.2    235 209       Recent Labs     07/19/22  0440 07/20/22  0453 07/21/22  0508   * 131* 132*   K 4.1 4.4 4.3   CL 93* 94* 94*   CO2 25 20* 25   GLUCOSE 209* 238* 206*   PHOS 4.7 5.4* 4.9   MG 1.70* 1.90 1.80   BUN 43* 57* 53*   CREATININE 4.3* 6.3* 5.9*   LABGLOM 14* 9* 10*   GFRAA 17* 11* 12*         Assessment/Plan     #ESKD on HD Monday Wednesday Friday at [facility]               Target weight on discharge on 7/9 from hospital was 106 kg               Urgent dialysis on 7/17 and 7/18 with 4 / 3L UF, last post weight 105.7kg; HD yesterday DID NOT WANT TO STAND for weight, reached 116.8 kg on bed -weight    -DAVON, Hectorol-with HD  #Acute on chronic respiratory failure on BiPAP and now weaned off to nasal cannula    #Hyponatremia from volume overload    #Anemia - DAVON with HD    #CKD/MBD               Patient on Hectorol 8 mcg as outpatient    #Hypertension, poorly controlled, better with UF with HD     #History of CHF with reduced EF, EF around 20 to 25%    #Bicuspid severe aortic stenosis status post TAVR on 10/2019 along with CAD status post RCA stenting in 1/14/2022 and PATRICIA LAD on 2015          ____________________________________  Anali Díaz MD  [facility]  [email or web address]  Office: [phone]

## 2022-07-21 NOTE — PROGRESS NOTES
[facility] 79   Daily Progress Note    Admit Date:  7/17/2022  HPI:    Chief Complaint   Patient presents with    Shortness of Breath     Seen multiple time for this, \"wants the fluids off is lungs\"  states \"I'm not going home you have to do something\"         Анна Dumas presented with worsening shortness of breath. Hx of CAD s/p PCI to LAD in 2015, RCA 1/2022, AS s/p TAVR 2019, ICM, s/d CHF, A. Fib (Eliquis), HYPERTENSION, HLD, DM, PAD s/p PTA R iliac artery, ESRD on HD, chronic anemia, ZAINAB on CPAP, COPD, and hx of CVA. Subjective:  Mr. Jade Burris lying in bed on CPAP. Reports breathing about the same, improved with CPAP, no different after dialysis yesterday.  No chest pain     Objective:   Patient Vitals for the past 24 hrs:   BP Temp Temp src Pulse Resp SpO2 Weight   07/21/22 0915 (!) 150/87 98.3 °F (36.8 °C) -- 68 18 97 % --   07/21/22 0838 -- -- -- -- 19 -- --   07/21/22 0611 (!) 123/54 98 °F (36.7 °C) Axillary 70 19 95 % --   07/21/22 0415 -- -- -- -- 18 -- --   07/20/22 2357 -- -- -- -- 18 -- --   07/20/22 2307 (!) 161/94 97.9 °F (36.6 °C) Oral 77 16 98 % --   07/20/22 1957 (!) 182/79 98.1 °F (36.7 °C) Oral 78 16 99 % --   07/20/22 1725 (!) 140/68 -- -- 72 18 -- 257 lb 8 oz (116.8 kg)   07/20/22 1425 (!) 152/71 -- -- 71 18 -- 264 lb 8.8 oz (120 kg)   07/20/22 1130 (!) 150/66 97.7 °F (36.5 °C) Oral 68 16 96 % --         Intake/Output Summary (Last 24 hours) at 7/21/2022 0932  Last data filed at 7/21/2022 0441  Gross per 24 hour   Intake 240 ml   Output 0 ml   Net 240 ml       Wt Readings from Last 3 Encounters:   07/20/22 257 lb 8 oz (116.8 kg)   07/08/22 232 lb 12.9 oz (105.6 kg)   07/04/22 242 lb (109.8 kg)         ASSESSMENT:   HFrEF: elevated BNP (chronic), shortness of breath, fluid overloaded  CAD: hx PCI, recent LHC with moderate to severe in-stent stenosis, medical management recommended, on Plavix, beta blocker, statin, nitrate  ICM: EF 20-25%, on Entresto, Toprol - doses titrated  AS/ TAVR: 2019, stable by recent echo  AFIB: currently SR, anticoagulation with Elliquis  HYPERTENSION: remains sub-optimal, often comes in with hypertensive emergency, likely related to med non-compliance  ESRD: HD/ UF per nephrology, still above ideal dry weight  DM2  PAD  ZAINAB  Hx CVA      PLAN:  Continue Entresto to 49/51 mg bid   Continue 100 mg bid  Fluid management per HD/ nephrology - agree with driving dry weight lower  Continue Eliquis for PAF  Continue Plavix and statin for CAD/ recent PCI  Continue nitrate for CAD/ angina  Daily weights, labs  No further testing/ procedures from cardiac perspective. Will continue current doses of Entresto and Toprol XL for time being. Can continue to optimize as outpatient. He has an appointment with me on 9/1/22 - keep. Plans for him to go to rehab facility.      Moe Harrell, JAY - CNP, 7/21/2022, 9:32 AM  [facility]   [phone]       Telemetry: SR 60-80  NYHA: III    Physical Exam:  General:  Awake, alert, NAD  Skin:  Warm and dry  Neck:  JVP difficult to assess  Chest: Diminished posteriorly bilaterally  Cardiovascular:  RRR, normal S1S2, + murmur, no g/r  Abdomen:  Soft, nontender, +bowel sounds  Extremities:  1+ BLE non-pitting edema      Medications:    metoprolol succinate  100 mg Oral BID    mupirocin   Topical Daily    doxercalciferol  8 mcg IntraVENous Q MWF    sacubitril-valsartan  2 tablet Oral BID    epoetin siddharth-epbx  5,000 Units IntraVENous Q MWF    clopidogrel  75 mg Oral Daily    isosorbide dinitrate  20 mg Oral TID    pantoprazole  40 mg Oral QAM AC    pravastatin  40 mg Oral Daily    tiotropium-olodaterol  2 puff Inhalation Daily    traZODone  100 mg Oral Nightly    sodium chloride flush  5-40 mL IntraVENous 2 times per day    insulin lispro  0-6 Units SubCUTAneous TID WC    insulin lispro  0-3 Units SubCUTAneous Nightly    apixaban  2.5 mg Oral BID      sodium chloride      dextrose         Lab Data: Lab results independently reviewed and analyzed by myself 7/21/2022    CBC:   Recent Labs     07/19/22  0440 07/20/22  0453 07/21/22  0508   WBC 9.6 9.9 9.2   HGB 9.8* 10.1* 9.9*    235 209       BMP:    Recent Labs     07/19/22  0440 07/20/22  0453 07/21/22  0508   * 131* 132*   K 4.1 4.4 4.3   CO2 25 20* 25   BUN 43* 57* 53*   CREATININE 4.3* 6.3* 5.9*       INR:    No results for input(s): INR in the last 72 hours. BNP:    No results for input(s): PROBNP in the last 72 hours. Cardiac Enzymes:   No results for input(s): TROPONINI in the last 72 hours. Lipids:   Lab Results   Component Value Date/Time    TRIG 76 07/17/2022 05:46 AM    TRIG 213 03/18/2022 06:57 AM    HDL 52 07/17/2022 05:46 AM    HDL 38 03/18/2022 06:57 AM    LDLCALC 80 07/17/2022 05:46 AM    LDLCALC 86 03/18/2022 06:57 AM    LDLDIRECT 199 09/04/2014 10:33 AM    LDLDIRECT 172 03/07/2014 09:37 AM       Cardiac Imaging:   CARDIAC CATH 6/7/22:   FINDINGS   HEMODYNAMICS   CHAMBER PRESSURE SATURATION   RA  10 60%   RV  47/9     PA  50/20  67%   PW  19     AORTA  101/50  95%      NANCY CARDIAC OUTPUT  5.8 L/min   SVR  756    PVR  234       LVGRAM   LVEDP  21   GRADIENT ACROSS AORTIC VALVE  less than 5 mmHg   LV FUNCTION EF 20%   WALL MOTION  severe global hypokinesis with regional variation   MITRAL REGURGITATION  mild      CORONARY ARTERIES   LM Less than 10% fmjrzbhs-kzw-thsvlb stenosis            LAD Moderately calcified, 20 to 30% proximal-mid stenosis, distal vessel tapers at the apex with 60% diffuse disease. D1 has an ostial/proximal 75 to 80% stenosis. LCX  Calcified, proximal-mid 75 to 80% stenosis. Distal vessel is tortuous with 70 to 85% diffuse stenosis with more discrete stenosis noted distally. OM 1 is a very small vessel with ostial/proximal 80% stenosis and 60 to 70% diffuse disease in the lhhnexyx-tbp-xgakts vessel. [place]          RCA Dominant, calcified, tortuous, there is a proximal-mid stent with 60 to 70% in-stent restenosis. Distal vessel 20 to 30% stenosis      CONCLUSIONS:   Mild to moderately increased filling pressures  Patent RCA stent with moderate to borderline severe in-stent restenosis  Severe circumflex and D1 stenosis  Continue medical management, consider outpatient high risk PCI of above territories pending outpatient clinical follow-up. Currently medical management seems best given comorbidities and need for additional fluid removal.  If PCI is pursued, will likely need general anesthesia. Patient had difficulty lying still on Cath Lab table. Okay to resume Eliquis tomorrow, case/plan/findings discussed with patient and wife, they understand/agree, they stated it is incorrectly stated in chart the patient would refuse blood, he is okay to receive blood products if needed. Continue beta-blocker and Entresto     No further inpatient cardiac work-up or treatment plan, will sign off, please call questions       ECHO 6/3/22:    Summary    Definity® used for myocardial border enhancement. Left ventricular systolic function is severely reduced with a visually estimated ejection fraction of 20-25 %. EF estimated by Jasmine's method at 24 %. The left ventricle is mildly dilated in size with normal wall thickness. Severe global hypokinesis with regional variation. Grade I diastolic dysfunction with normal LV pressure. There is no evidence of a left ventricular thrombus. Right ventricular systolic function is reduced. A 29 mm Langford Leyda S3 bioprosthetic aortic valve appears well seated with normal Doppler values. Inadequate tricuspid valve regurgitation to estimate systolic pulmonary artery pressure. There is a moderate left pleural effusion noted.       Marrian Reading nuclear stress test 6/2/2022:   Severe LV dysfunction EF 26%    Global hypokinesis with regional variation, more pronounced in inferolateral    wall and apex    Large mainly fixed defect in inferior wall, extends to portions of lateral    wall and apex with moderate darrick-infarct ischemia        Overall, this would be considered an abnormal, higher risk, study          Coronary angiogram 1/14/2022:  1. Left heart catheterization  2. Selective left and right coronary angiogram  3. PCI of the RCA with PATRICIA  Procedure Findings:  1. 99% mid RCA  2. 60-70% CIRC and DIAG  3. Elevated left heart hemodynamics              ~LVEDP 30       Details:              Isabela Howard was brought to the cardiac catheterization lab in a fasting state after informed consent was obtained. If the patient was able to provide written consent, it was obtained. The patient's vitals were monitored through out the procedure. The patient was sedated using the appropriate levels of sedation and ASA guidelines. The appropriate access site area was prepped and drapped in a sterile fashion. The area was anesthetized with 2% lidocaine. Using the modified Seldinger technique, an arterial sheath was introduced into the arterial access site using a single anterior wall puncture. The sheath was flushed and prepped in usual fashion. Catheters used during the procedure included 5 [place]ian TIG 4.0 catheter. The catheters were advanced and removed over a .035\" wire, into the appropriate positions. Multiple angiographic views were obtained. An LV gram was not obtained. ~The interventional portion of the procedure was completed utilizing a multipurpose 6 [place] guide. Multipurpose guide was advanced into the right coronary ostium. A gentleman therapy aspirin, Plavix, Angiomax was initiated. A BMW wire was advanced into the distal right coronary artery. The lesion was initially predilated with a 2.75 x 15 mm Euphora balloon followed by a 3.5 mm x 20 mm Euphora balloon and subsequently stented with a resolute Willam 4.0 mm x 38 mm balloon. We did experience a no reflow phenomenon. We separately done.   A twin pass catheter and then did local drug delivery with 200 mcg of nitroprusside and 200 mcgnitroglycerin. Provided restoration of SCARLETT-3 flow. Findings:  1. Left main coronary artery was normal. It gave off the left anterior descending artery and left circumflex. 2. Left anterior descending artery has 60% severe atherosclerotic disease. It was moderate in size. It gave off septal perforators and a moderate sized diagonal branch. The LAD covered the entire apex of the left ventricle. 3. Left circumflex has 60% severe atherosclerotic disease. It was moderate in size. There was a moderate sized obtuse marginal branch. 4. Right coronary artery has 99% severe atherosclerotic disease. It was moderate in size and was the dominant artery. CONCLUSIONS:  1. Apercutaneous coronary intervention of the right coronary artery utilizing a single resolute Willam drug-eluting stent  ASSESSMENT/RECOMMENDATIONS:  1. Dual antiplatelet therapy  2. Medical management of the remaining coronary disease     Echo 1/2022:   Limited only f/u for LVEF and RVF. The left ventricular systolic function is mildly reduced with an ejection   fraction of 40-45 %. There is hypokinesis of the apex and inferior walls. There is a very small circumferential pericardial effusion noted. No tamponade physiology. The right ventricle is normal in size and function. Echo 7/9/2020:  Low Normal systolic function with an estimated ejection fraction of 50%. Left ventricular function and wall motion is difficult to estimate due to   poor endocardial visualization. Grade I diastolic dysfunction with normal filing pressure. Normally functioning TAVR 29 mm Langford Leyda S3 bioprosthetic valve in   aortic position appears well seated with a max velocity of 2.11 m/sec,   maximum gradient of 18 mmHg and a mean gradient of 8 mmHg.    There is no evidence of aortic regurgitation    Coronary angiogram 8/22/2019:  LM <20%  LAD patent stent  Cx <20%  RCA 30%  Severe AS by echo  Proceed w/ TAVR    Echo 1/24/2017:  Summary   Left ventricular systolic function is normal with an ejection fraction of   55-60%. No wall motion abnormalities noted. Mild concentric left ventricular hypertrophy. Grade II diastolic dysfunction with elevated filling pressure. Mildly dilated left atrium. Moderate aortic stenosis. Mild aortic regurgitation. Compared to previous study from 10/27/2016, aortic stenosis has not   progressed. R/LHC 1/23/2017:  LM <20%  LAD 30% w/ patent stent  Cx 20-30%  RCA 20-30%  LVEDP 19  RA 8, RV 41/10, PA 45/14/31, W 15      Nonobstructive CAD  Mild elevated right heart pressures        Echo Oct 2016:   Normal left ventricle size with mild concentric left ventricular hypertrophy. Normal systolic function with an estimated ejection fraction of 55%. No regional wall motion abnormalities are seen. Elevated left ventricular diastolic filling pressure ( E/e' 21 ). The aortic valve is thickened/calcified with decreased leaflet mobility. Cannot exclude a bicuspid valve. The aortic valve area is calculated at 1.0 cm2 with a max velocity of 3.36 m/sec, maximum pressure gradient of 45 mmHg and a mean pressure gradient of 23 mmHg. This is c/w moderate aortic stenosis. Color flow demonstrates eccentric jet suggesting mild aortic regurgitation. Trace mitral and tricuspid regurgitation. Systolic pulmonary artery pressure (SPAP) is normal and estimated at 22 mmHg (RA pressure 3 mmHg). There is mild concentric left ventricular hypertrophy. MPI 6/18/15: There is a very small and mild fixed posterior-basal defect consistent with  fibrosis. There is no evidence for ischemia. Left ventricular function is reduced with and estimated LVEF of 40%. The LV is severely dilated. CATH: 5/26/15, Dr. Michelle Silver  LM <20%   LAD 70% mid.  FFR 0.77  D1 50% prox  Cx 20%  RCA 20%  LVEDP 22mmHg  RA 9, RV 42/10, PA 44/16/31, W 18  PA sat 63%    PTCA LAD  3.0 x 15 PATRICIA  prox portion post 3.5 x 8 NC balloon    DAPT, statin  Resume lasix and ARB at discharge provided Cr stable  Renal fxn will not tolerate higher dose of lasix than 40 daily  Needs smoking cessation, weight loss, exercise  Rec OP sleep eval

## 2022-07-21 NOTE — PROGRESS NOTES
consistently  Attention Span: Attends with cues to redirect  Safety Judgement: Decreased awareness of need for safety;Decreased awareness of need for assistance  Problem Solving: Assistance required to correct errors made;Assistance required to identify errors made  Insights: Decreased awareness of deficits  Sequencing: Requires cues for some  Cognition Comment: vc's for safety and insight        Objective    Vitals /54, HR 68, O2 sats 94% RA     Bed Mobility Training  Bed Mobility Training:  (Pt seated EOB at start and end of session. Bed alarm turned on.)  Transfer Training  Transfer Training: Yes  Sit to Stand: Minimum assistance (RW)  Stand to Sit: Minimum assistance (vc's for hand placement)  Bed to Chair: Minimum assistance (RW)     ADL  Feeding: Independent  Grooming: Supervision;Setup  Grooming Skilled Clinical Factors: seated  LE Dressing: Minimal assistance  LE Dressing Skilled Clinical Factors: don slippers  Additional Comments: Declined further ADLs this am.  OT Exercises  Circulation/Endurance Exercises: Performed BUE exer 10-15x each for shoulders flex, horiz abd/add, elbow flex, forearm and hand ROM while seated EOB     Safety Devices  Type of Devices: Bed alarm in place; All fall risk precautions in place;Gait belt;Nurse notified;Call light within reach; Left in bed     Patient Education  Education Given To: Patient  Education Provided: Role of Therapy;Plan of Care;Home Exercise Program;ADL Adaptive Strategies;Transfer Training;Equipment  Education Method: Verbal  Barriers to Learning: Cognition  Education Outcome: Verbalized understanding;Continued education needed  Disease Specific Education: Pt educated on importance of OOB mobility, prevention of complications of bedrest, and general safety during hospitalization.  Pt verbalized understanding    Goals  Short Term Goals  Time Frame for Short term goals: 1 week unless otherwise specified (7/26)  Short Term Goal 1: Pt will verbalize understanding and 50% compliance of CHF education to reduce hospitalizations-ongoing 7/21  Short Term Goal 2: Pt will complete functional mobility/transfers SPV with LRAD by 7/22-ongoing 7/21  Short Term Goal 3: Pt will complete UE bathing/dressing and simple grooming sitting Shanda-ongoing 7/21  Patient Goals   Patient goals : \"I dont have one\"     AM-PAC Daily Activity Inpatient   How much help for putting on and taking off regular lower body clothing?: A Lot  How much help for Bathing?: A Lot  How much help for Toileting?: A Lot  How much help for putting on and taking off regular upper body clothing?: A Little  How much help for taking care of personal grooming?: A Little  How much help for eating meals?: None  AM-PAC Inpatient Daily Activity Raw Score: 16  AM-PAC Inpatient ADL T-Scale Score : 35.96  ADL Inpatient CMS 0-100% Score: 53.32  ADL Inpatient CMS G-Code Modifier : CK    Therapy Time   Individual Concurrent Group Co-treatment   Time In       0927   Time Out       0950   Minutes       23   Timed Code Treatment Minutes: 23 Minutes   If pt is discharged prior to next OT session, this note will serve as the discharge summary.   Vazquez Hannah OT

## 2022-07-21 NOTE — PROGRESS NOTES
Report given to [facility]. Nurse informed of patient history, medications and prescriptions, PT/OT needs and dialysis schedule. Nurse provided with callback number for any further questions.

## 2022-07-21 NOTE — TELEPHONE ENCOUNTER
Pt discharge from hospital 7/21/2022. Per [employer] note 7/21/2022 pt to keep [employer] OV f/u 9/1/2022. Will close encounter as there is nothing else needed at this time.

## 2022-07-21 NOTE — PROGRESS NOTES
Patient discharged with [facility]. Patient sent with personal belongings and prescription and discharge/CELINE paperwork.

## 2022-07-22 ENCOUNTER — TELEPHONE (OUTPATIENT)
Dept: PULMONOLOGY | Age: 52
End: 2022-07-22

## 2022-07-22 LAB
ESTIMATED AVERAGE GLUCOSE: 154.2 MG/DL
HBA1C MFR BLD: 7 %

## 2022-07-28 ENCOUNTER — TELEPHONE (OUTPATIENT)
Dept: FAMILY MEDICINE CLINIC | Age: 52
End: 2022-07-28

## 2022-07-28 NOTE — TELEPHONE ENCOUNTER
Patient is requesting to speak to practice manager regarding his dismal. He was informed manager is out of the office this week. She will return call next week.

## 2022-08-02 ENCOUNTER — APPOINTMENT (OUTPATIENT)
Dept: GENERAL RADIOLOGY | Age: 52
End: 2022-08-02
Payer: COMMERCIAL

## 2022-08-02 ENCOUNTER — APPOINTMENT (OUTPATIENT)
Dept: CT IMAGING | Age: 52
End: 2022-08-02
Payer: COMMERCIAL

## 2022-08-02 ENCOUNTER — HOSPITAL ENCOUNTER (EMERGENCY)
Age: 52
Discharge: HOME | End: 2022-08-02
Attending: STUDENT IN AN ORGANIZED HEALTH CARE EDUCATION/TRAINING PROGRAM
Payer: COMMERCIAL

## 2022-08-02 VITALS
RESPIRATION RATE: 16 BRPM | DIASTOLIC BLOOD PRESSURE: 68 MMHG | OXYGEN SATURATION: 98 % | HEIGHT: 69 IN | TEMPERATURE: 98.7 F | SYSTOLIC BLOOD PRESSURE: 156 MMHG | BODY MASS INDEX: 38.06 KG/M2 | WEIGHT: 257 LBS | HEART RATE: 66 BPM

## 2022-08-02 DIAGNOSIS — Z99.2 ESRD ON DIALYSIS (HCC): ICD-10-CM

## 2022-08-02 DIAGNOSIS — N18.6 ESRD ON DIALYSIS (HCC): ICD-10-CM

## 2022-08-02 DIAGNOSIS — R06.02 CHRONIC SHORTNESS OF BREATH: ICD-10-CM

## 2022-08-02 DIAGNOSIS — J90 CHRONIC PLEURAL EFFUSION: ICD-10-CM

## 2022-08-02 DIAGNOSIS — I50.9 CHRONIC CONGESTIVE HEART FAILURE, UNSPECIFIED HEART FAILURE TYPE (HCC): Primary | ICD-10-CM

## 2022-08-02 DIAGNOSIS — I10 HYPERTENSION, UNSPECIFIED TYPE: ICD-10-CM

## 2022-08-02 LAB
A/G RATIO: 1.3 (ref 1.1–2.2)
ALBUMIN SERPL-MCNC: 3.8 G/DL (ref 3.4–5)
ALP BLD-CCNC: 100 U/L (ref 40–129)
ALT SERPL-CCNC: 6 U/L (ref 10–40)
ANION GAP SERPL CALCULATED.3IONS-SCNC: 12 MMOL/L (ref 3–16)
AST SERPL-CCNC: 9 U/L (ref 15–37)
BASE EXCESS VENOUS: -1.1 MMOL/L (ref -3–3)
BASOPHILS ABSOLUTE: 0.1 K/UL (ref 0–0.2)
BASOPHILS RELATIVE PERCENT: 0.6 %
BILIRUB SERPL-MCNC: <0.2 MG/DL (ref 0–1)
BUN BLDV-MCNC: 30 MG/DL (ref 7–20)
CALCIUM SERPL-MCNC: 8.3 MG/DL (ref 8.3–10.6)
CARBOXYHEMOGLOBIN: 4.5 % (ref 0–1.5)
CHLORIDE BLD-SCNC: 103 MMOL/L (ref 99–110)
CO2: 24 MMOL/L (ref 21–32)
CREAT SERPL-MCNC: 5.3 MG/DL (ref 0.9–1.3)
EOSINOPHILS ABSOLUTE: 0.2 K/UL (ref 0–0.6)
EOSINOPHILS RELATIVE PERCENT: 2.4 %
GFR AFRICAN AMERICAN: 14
GFR NON-AFRICAN AMERICAN: 11
GLUCOSE BLD-MCNC: 158 MG/DL (ref 70–99)
HCO3 VENOUS: 23.8 MMOL/L (ref 23–29)
HCT VFR BLD CALC: 32.6 % (ref 40.5–52.5)
HEMOGLOBIN: 10.5 G/DL (ref 13.5–17.5)
LYMPHOCYTES ABSOLUTE: 0.7 K/UL (ref 1–5.1)
LYMPHOCYTES RELATIVE PERCENT: 7.2 %
MAGNESIUM: 2.1 MG/DL (ref 1.8–2.4)
MCH RBC QN AUTO: 29.5 PG (ref 26–34)
MCHC RBC AUTO-ENTMCNC: 32.1 G/DL (ref 31–36)
MCV RBC AUTO: 92 FL (ref 80–100)
METHEMOGLOBIN VENOUS: 0.2 %
MONOCYTES ABSOLUTE: 0.7 K/UL (ref 0–1.3)
MONOCYTES RELATIVE PERCENT: 7.1 %
NEUTROPHILS ABSOLUTE: 7.7 K/UL (ref 1.7–7.7)
NEUTROPHILS RELATIVE PERCENT: 82.7 %
O2 SAT, VEN: 99 %
O2 THERAPY: ABNORMAL
PCO2, VEN: 40.5 MMHG (ref 40–50)
PDW BLD-RTO: 16.5 % (ref 12.4–15.4)
PH VENOUS: 7.39 (ref 7.35–7.45)
PHOSPHORUS: 3.7 MG/DL (ref 2.5–4.9)
PLATELET # BLD: 260 K/UL (ref 135–450)
PMV BLD AUTO: 7.4 FL (ref 5–10.5)
PO2, VEN: 174.8 MMHG (ref 25–40)
POTASSIUM SERPL-SCNC: 4.5 MMOL/L (ref 3.5–5.1)
PRO-BNP: ABNORMAL PG/ML (ref 0–124)
RBC # BLD: 3.54 M/UL (ref 4.2–5.9)
SARS-COV-2, NAAT: NOT DETECTED
SODIUM BLD-SCNC: 139 MMOL/L (ref 136–145)
TCO2 CALC VENOUS: 25 MMOL/L
TOTAL PROTEIN: 6.7 G/DL (ref 6.4–8.2)
TROPONIN: 0.09 NG/ML
TROPONIN: 0.09 NG/ML
WBC # BLD: 9.3 K/UL (ref 4–11)

## 2022-08-02 PROCEDURE — 71045 X-RAY EXAM CHEST 1 VIEW: CPT

## 2022-08-02 PROCEDURE — 99285 EMERGENCY DEPT VISIT HI MDM: CPT

## 2022-08-02 PROCEDURE — 6370000000 HC RX 637 (ALT 250 FOR IP): Performed by: STUDENT IN AN ORGANIZED HEALTH CARE EDUCATION/TRAINING PROGRAM

## 2022-08-02 PROCEDURE — 85025 COMPLETE CBC W/AUTO DIFF WBC: CPT

## 2022-08-02 PROCEDURE — 94640 AIRWAY INHALATION TREATMENT: CPT

## 2022-08-02 PROCEDURE — 94761 N-INVAS EAR/PLS OXIMETRY MLT: CPT

## 2022-08-02 PROCEDURE — 83735 ASSAY OF MAGNESIUM: CPT

## 2022-08-02 PROCEDURE — 70450 CT HEAD/BRAIN W/O DYE: CPT

## 2022-08-02 PROCEDURE — 6360000002 HC RX W HCPCS: Performed by: STUDENT IN AN ORGANIZED HEALTH CARE EDUCATION/TRAINING PROGRAM

## 2022-08-02 PROCEDURE — 93005 ELECTROCARDIOGRAM TRACING: CPT | Performed by: STUDENT IN AN ORGANIZED HEALTH CARE EDUCATION/TRAINING PROGRAM

## 2022-08-02 PROCEDURE — 84100 ASSAY OF PHOSPHORUS: CPT

## 2022-08-02 PROCEDURE — 2700000000 HC OXYGEN THERAPY PER DAY

## 2022-08-02 PROCEDURE — 83880 ASSAY OF NATRIURETIC PEPTIDE: CPT

## 2022-08-02 PROCEDURE — 82803 BLOOD GASES ANY COMBINATION: CPT

## 2022-08-02 PROCEDURE — 87635 SARS-COV-2 COVID-19 AMP PRB: CPT

## 2022-08-02 PROCEDURE — 80053 COMPREHEN METABOLIC PANEL: CPT

## 2022-08-02 PROCEDURE — 96374 THER/PROPH/DIAG INJ IV PUSH: CPT

## 2022-08-02 PROCEDURE — 84484 ASSAY OF TROPONIN QUANT: CPT

## 2022-08-02 RX ORDER — METOPROLOL SUCCINATE 50 MG/1
100 TABLET, EXTENDED RELEASE ORAL ONCE
Status: COMPLETED | OUTPATIENT
Start: 2022-08-02 | End: 2022-08-02

## 2022-08-02 RX ORDER — FUROSEMIDE 10 MG/ML
40 INJECTION INTRAMUSCULAR; INTRAVENOUS ONCE
Status: COMPLETED | OUTPATIENT
Start: 2022-08-02 | End: 2022-08-02

## 2022-08-02 RX ORDER — OXYCODONE AND ACETAMINOPHEN 7.5; 325 MG/1; MG/1
1 TABLET ORAL ONCE
Status: COMPLETED | OUTPATIENT
Start: 2022-08-02 | End: 2022-08-02

## 2022-08-02 RX ORDER — IPRATROPIUM BROMIDE AND ALBUTEROL SULFATE 2.5; .5 MG/3ML; MG/3ML
1 SOLUTION RESPIRATORY (INHALATION) ONCE
Status: COMPLETED | OUTPATIENT
Start: 2022-08-02 | End: 2022-08-02

## 2022-08-02 RX ADMIN — METOPROLOL SUCCINATE 100 MG: 50 TABLET, EXTENDED RELEASE ORAL at 08:29

## 2022-08-02 RX ADMIN — FUROSEMIDE 40 MG: 10 INJECTION, SOLUTION INTRAMUSCULAR; INTRAVENOUS at 07:28

## 2022-08-02 RX ADMIN — OXYCODONE AND ACETAMINOPHEN 1 TABLET: 7.5; 325 TABLET ORAL at 10:11

## 2022-08-02 RX ADMIN — IPRATROPIUM BROMIDE AND ALBUTEROL SULFATE 1 AMPULE: .5; 2.5 SOLUTION RESPIRATORY (INHALATION) at 10:29

## 2022-08-02 ASSESSMENT — PAIN SCALES - GENERAL
PAINLEVEL_OUTOF10: 10
PAINLEVEL_OUTOF10: 6

## 2022-08-02 NOTE — TELEPHONE ENCOUNTER
8/2 PM called pt - informed him of the 5 no shows for this year - this is not counting the cancellations due to being in hospital - he stated he needs seen one more time - checked dismissal date and we are within 30 days so scheduled him for VV on 8/5 - PM informed pt that he needed to begin searching for new PCP

## 2022-08-02 NOTE — ED NOTES
Called report to berta at [facility]. . discussed pt labs, meds given here and return to facility     Maria Luisa Summers RN  08/02/22 [id number]

## 2022-08-02 NOTE — DISCHARGE INSTRUCTIONS
Your work-up today was stable from previous. We recommend close follow-up with your primary care doctor. Please return the emergency department if you have any new or worsening symptoms.

## 2022-08-02 NOTE — ED PROVIDER NOTES
Grade I diastolic dysfunction with normal LV pressure. There is no evidence of a left ventricular thrombus. Right ventricular systolic function is reduced. A 29 mm Langford Leyda S3 bioprosthetic aortic valve appears well seated   with normal Doppler values. Inadequate tricuspid valve regurgitation to estimate systolic pulmonary   artery pressure. There is a moderate left pleural effusion noted. Cardiac Cath 6/7/22  CONCLUSIONS:      Mild to moderately increased filling pressures  Patent RCA stent with moderate to borderline severe in-stent restenosis  Severe circumflex and D1 stenosis  Continue medical management, consider outpatient high risk PCI of above territories pending outpatient clinical follow-up. Currently medical management seems best given comorbidities and need for additional fluid removal.  If PCI is pursued, will likely need general anesthesia. Patient had difficulty lying still on Cath Lab table. No other complaints, modifying factors or associated symptoms. I have reviewed the following from the nursing documentation.     Past Medical History:   Diagnosis Date    Ambulatory dysfunction     walker for long distances, SOB with distance    Aortic stenosis     echo 2017    Arthritis     hands and hips    Asthma     Bilateral hilar adenopathy syndrome 6/3/2013    CAD (coronary artery disease)     Dr. [facility]les [facility]) 04/19/2019    EF= 43%    CHF (congestive heart failure) (HCC)     Chronic pain     COPD (chronic obstructive pulmonary disease) ([address]     pulmonology Dr. Cheyenne Ahn    Depression     Diabetes mellitus ([address]     borderline    Difficult intravenous access     Emphysema of lung ([address]     ESRD (end stage renal disease) on dialysis ([address]     MWF    Fear of needles     Gastric ulcer     GERD (gastroesophageal reflux disease)     Heart valve problem     bicuspic valve    Hemodialysis patient ([address]     History of spinal fracture CATHETER PLACEMENT GREATER THAN 5 YEARS  2022    IR TUNNELED CATHETER PLACEMENT GREATER THAN 5 YEARS 2022 [facility]    OTHER SURGICAL HISTORY  2017    laparoscopic cholecystectomy with intraoperative cholangiogram    OTHER SURGICAL HISTORY  2018    PORT PLACEMENT  - vas cath    OTHER SURGICAL HISTORY Bilateral 2018    laprascopic peritoneal dialysis catheter placement    OTHER SURGICAL HISTORY Right 2018    peritoneal dialysis port placed on right side of abdomen    OTHER SURGICAL HISTORY  2019    PTA/Stenting R External Iliac artery    NM LAP INSERTION TUNNELED INTRAPERITONEAL CATHETER N/A 2018    LAPAROSCOPIC PERITONEAL DIALYSIS CATHETER REPLACEMENT performed by Reagan Macias MD at [facility] N/A 2022    PERINEAL ABCESS DRAINAGE performed by [facility] MD at [facility]  2016    UPPER GASTROINTESTINAL ENDOSCOPY  2017    possible candida, otherwise normal appearing    VASCULAR SURGERY  aprx 2 years ago    2 stents placed, each side of groin     Family History   Problem Relation Age of Onset    Diabetes Mother     Heart Disease Father     Kidney Disease Sister         stage 4-kidney failure    Cancer Sister     Heart Disease Sister     Obesity Sister     Cancer Sister     Heart Disease Sister     Obesity Sister     Alcohol Abuse Brother      Social History     Socioeconomic History    Marital status:      Spouse name: Not on file    Number of children: Not on file    Years of education: Not on file    Highest education level: Not on file   Occupational History    Not on file   Tobacco Use    Smoking status: Former     Packs/day: 0.50     Years: 33.00     Pack years: 16.50     Types: Cigarettes     Quit date: 2020     Years since quittin.2    Smokeless tobacco: Never    Tobacco comments:     States quit December 2021   Vaping Use    Vaping Use: Never used   Substance and Sexual Activity    Alcohol use: Not Currently     Alcohol/week: 0.0 standard drinks     Comment: occ    Drug use: No    Sexual activity: Yes     Partners: Female     Comment:    Other Topics Concern    Not on file   Social History Narrative    Not on file     Social Determinants of Health     Financial Resource Strain: Not on file   Food Insecurity: Not on file   Transportation Needs: Not on file   Physical Activity: Not on file   Stress: Not on file   Social Connections: Not on file   Intimate Partner Violence: Not on file   Housing Stability: Not on file     No current facility-administered medications for this encounter. Current Outpatient Medications   Medication Sig Dispense Refill    metoprolol succinate (TOPROL XL) 100 MG extended release tablet Take 1 tablet by mouth in the morning and at bedtime 30 tablet 3    sacubitril-valsartan (ENTRESTO) 24-26 MG per tablet Take 2 tablets by mouth in the morning and 2 tablets before bedtime.  60 tablet     cyclobenzaprine (FLEXERIL) 5 MG tablet Muscle spasms      traZODone (DESYREL) 100 MG tablet Take 100 mg by mouth nightly      QUEtiapine (SEROQUEL) 50 MG tablet TAKE 1 TABLET BY MOUTH EVERY EVENING 30 tablet 10    isosorbide dinitrate (ISORDIL) 20 MG tablet Take 1 tablet by mouth 3 times daily 90 tablet 3    clopidogrel (PLAVIX) 75 MG tablet Take 1 tablet by mouth daily 90 tablet 3    pantoprazole (PROTONIX) 40 MG tablet TAKE 1 TABLET BY MOUTH EVERY MORNING BEFORE BREAKFAST 30 tablet 10    docusate sodium (DOK) 100 MG capsule Take 1 capsule by mouth daily 30 capsule 5    LINZESS 145 MCG capsule TAKE 1 CAPSULE BY MOUTH IN THE MORNING BEFORE BREAKFAST 30 capsule 10    DULoxetine (CYMBALTA) 30 MG extended release capsule TAKE 1 CAPSULE BY MOUTH EVERY DAY 30 capsule 10    mineral oil liquid Take 30 mLs by mouth daily as needed for Constipation 300 mL 0    sennosides-docusate sodium (SENOKOT-S) 8.6-50 MG tablet Take 1 tablet by mouth daily 10 tablet 0    apixaban (ELIQUIS) 5 MG TABS tablet Take 1 tablet by mouth 2 times daily 60 tablet 1    pravastatin (PRAVACHOL) 40 MG tablet Take 1 tablet by mouth daily 90 tablet 3    Continuous Blood Gluc Sensor (DEXCOM G6 SENSOR) MISC Every 10 days 9 each 3    Continuous Blood Gluc Transmit (DEXCOM G6 TRANSMITTER) MISC 1 each by Does not apply route every 3 months 1 each 3    Continuous Blood Gluc  (DEXCOM G6 ) ADAM 1 each by Does not apply route Daily with lunch 1 each 0    B Complex-C-Folic Acid (VIRT-CAPS) 1 MG CAPS TAKE 1 CAPSULE BY MOUTH EVERY DAY 90 capsule 1    Calcium Acetate, Phos Binder, 667 MG CAPS TAKE 1 CAPSULE BY MOUTH THREE TIMES DAILY WITH MEALS 90 capsule 3    nitroGLYCERIN (NITROSTAT) 0.4 MG SL tablet DISSOLVE 1 TABLET UNDER THE TONGUE AS NEEDED FOR CHEST PAIN EVERY 5 MINUTES UP TO 3 TIMES. IF NO RELIEF CALL 911. 25 tablet 10    vitamin D (ERGOCALCIFEROL) 23560 units CAPS capsule TK 1 C PO WEEKLY  11    Tiotropium Bromide-Olodaterol (STIOLTO RESPIMAT) 2.5-2.5 MCG/ACT AERS Inhale 2 puffs into the lungs daily 2 Inhaler 0    Blood Glucose Monitoring Suppl ADAM USE AS DIRECTED. 1 Device 0    Alcohol Swabs PADS USE AS DIRECTED 300 each 3    albuterol sulfate  (90 Base) MCG/ACT inhaler Inhale 2 puffs into the lungs every 6 hours as needed for Wheezing 1 Inhaler 3    ipratropium-albuterol (DUONEB) 0.5-2.5 (3) MG/3ML SOLN nebulizer solution Inhale 3 mLs into the lungs every 6 hours as needed for Shortness of Breath 360 mL 1    calcium carbonate (TUMS) 500 MG chewable tablet Take 1 tablet by mouth 3 times daily as needed for Heartburn. Allergies   Allergen Reactions    Morphine Nausea And Vomiting       REVIEW OF SYSTEMS  All systems reviewed, pertinent positives per HPI otherwise noted to be negative.     PHYSICAL EXAM  BP (!) 186/85   Pulse 70   Temp 99 °F (37.2 °C) (Oral)   Resp 16   Ht 5' 9\" (1.753 m)   Wt 257 lb (116.6 kg)   SpO2 97%   BMI 37.95 kg/m²    GENERAL APPEARANCE: Awake and alert. Cooperative. no distress. HENT: Normocephalic. Atraumatic. Mucous membranes are moist  NECK: Supple. Full range of motion of the neck without stiffness or pain. EYES: PERRL. EOM's grossly intact. HEART/CHEST: RRR. No murmurs. LUNGS: Respirations unlabored. CTAB. Diminished breath sounds bilaterally. Speaking comfortably in full sentences. ABDOMEN: No tenderness. Soft. Non-distended. No masses. No organomegaly. No guarding or rebound. MUSCULOSKELETAL: No extremity edema. Compartments soft. No deformity. No tenderness in the extremities. All extremities neurovascularly intact. SKIN: Warm and dry. No acute rashes. PSYCHIATRIC: Normal mood and affect. NEUROLOGICAL: Alert and oriented. CN's 2-12 intact. No gross facial drooping. Strength 5/5, sensation intact. NIHSS: 0      NIH Stroke Scale     Interval: Baseline  Person Administering Scale: Reilly Moreno MD     1a  Level of consciousness: 0=alert; keenly responsive   1b. LOC questions:  0=Performs both tasks correctly   1c. LOC commands: 0=Performs both tasks correctly   2. Best Gaze: 0=normal   3. Visual: 0=No visual loss   4. Facial Palsy: 0=Normal symmetric movement   5a. Motor left arm: 0=No drift, limb holds 90 (or 45) degrees for full 10 seconds   5b. Motor right arm: 0=No drift, limb holds 90 (or 45) degrees for full 10 seconds   6a. motor left le=No drift, limb holds 90 (or 45) degrees for full 10 seconds   6b  Motor right le=No drift, limb holds 90 (or 45) degrees for full 10 seconds   7. Limb Ataxia: 0=Absent   8. Sensory: 0=Normal; no sensory loss   9. Best Language:  0=No aphasia, normal   10. Dysarthria: 0=Normal   11. Extinction and Inattention: 0=No abnormality         Total:  0       LABS  I have reviewed all labs for this visit.    Results for orders placed or performed during the hospital encounter of 22 COVID-19, Rapid    Specimen: Nasopharyngeal Swab   Result Value Ref Range    SARS-CoV-2, NAAT Not Detected Not Detected   CBC with Auto Differential   Result Value Ref Range    WBC 9.3 4.0 - 11.0 K/uL    RBC 3.54 (L) 4.20 - 5.90 M/uL    Hemoglobin 10.5 (L) 13.5 - 17.5 g/dL    Hematocrit 32.6 (L) 40.5 - 52.5 %    MCV 92.0 80.0 - 100.0 fL    MCH 29.5 26.0 - 34.0 pg    MCHC 32.1 31.0 - 36.0 g/dL    RDW 16.5 (H) 12.4 - 15.4 %    Platelets 742 220 - 145 K/uL    MPV 7.4 5.0 - 10.5 fL    Neutrophils % 82.7 %    Lymphocytes % 7.2 %    Monocytes % 7.1 %    Eosinophils % 2.4 %    Basophils % 0.6 %    Neutrophils Absolute 7.7 1.7 - 7.7 K/uL    Lymphocytes Absolute 0.7 (L) 1.0 - 5.1 K/uL    Monocytes Absolute 0.7 0.0 - 1.3 K/uL    Eosinophils Absolute 0.2 0.0 - 0.6 K/uL    Basophils Absolute 0.1 0.0 - 0.2 K/uL   CMP   Result Value Ref Range    Sodium 139 136 - 145 mmol/L    Potassium 4.5 3.5 - 5.1 mmol/L    Chloride 103 99 - 110 mmol/L    CO2 24 21 - 32 mmol/L    Anion Gap 12 3 - 16    Glucose 158 (H) 70 - 99 mg/dL    BUN 30 (H) 7 - 20 mg/dL    Creatinine 5.3 (HH) 0.9 - 1.3 mg/dL    GFR Non- 11 (A) >60    GFR  14 (A) >60    Calcium 8.3 8.3 - 10.6 mg/dL    Total Protein 6.7 6.4 - 8.2 g/dL    Albumin 3.8 3.4 - 5.0 g/dL    Albumin/Globulin Ratio 1.3 1.1 - 2.2    Total Bilirubin <0.2 0.0 - 1.0 mg/dL    Alkaline Phosphatase 100 40 - 129 U/L    ALT 6 (L) 10 - 40 U/L    AST 9 (L) 15 - 37 U/L   Troponin   Result Value Ref Range    Troponin 0.09 (H) <0.01 ng/mL   Brain Natriuretic Peptide   Result Value Ref Range    Pro-BNP >70,000 (H) 0 - 124 pg/mL   Blood gas, venous   Result Value Ref Range    pH, Blade 7.387 7.350 - 7.450    pCO2, Blade 40.5 40.0 - 50.0 mmHg    pO2, Blade 174.8 (H) 25.0 - 40.0 mmHg    HCO3, Venous 23.8 23.0 - 29.0 mmol/L    Base Excess, Blade -1.1 -3.0 - 3.0 mmol/L    O2 Sat, Blade 99 Not Established %    Carboxyhemoglobin 4.5 (H) 0.0 - 1.5 %    MetHgb, Blade 0.2 <1.5 %    TC02 (Calc), Blade 25 Not Established mmol/L    O2 Therapy Unknown    Magnesium   Result Value Ref Range    Magnesium 2.10 1.80 - 2.40 mg/dL   Phosphorus   Result Value Ref Range    Phosphorus 3.7 2.5 - 4.9 mg/dL   Troponin   Result Value Ref Range    Troponin 0.09 (H) <0.01 ng/mL   EKG 12 Lead   Result Value Ref Range    Ventricular Rate 68 BPM    Atrial Rate 68 BPM    P-R Interval 182 ms    QRS Duration 92 ms    Q-T Interval 410 ms    QTc Calculation (Bazett) 435 ms    P Axis 51 degrees    R Axis -2 degrees    T Axis 127 degrees    Diagnosis       Normal sinus rhythmLow voltage QRST wave abnormality, consider lateral ischemiaPoor R wave progressionAbnormal ECGWhen compared with ECG of 18-JUL-2022 19:55,Nonspecific T wave abnormality no longer evident in Anterior leadsConfirmed by Madelin Moralez ([id number]) on 8/3/2022 4:10:08 PM         ECG  The Ekg interpreted by me shows  normal sinus rhythm with a rate of 68  Axis is   Normal  QTc is  within an acceptable range  Intervals and Durations are unremarkable. ST Segments: no acute change  No significant change from prior EKG dated 7/18/22    RADIOLOGY    CT HEAD WO CONTRAST   Final Result   No acute intracranial abnormality. XR CHEST PORTABLE   Final Result   Similar appearance demonstrating unchanged left basilar opacity could   represent left pleural fluid combined with infection or atelectasis. ED COURSE / MDM  Patient seen and evaluated. Old records reviewed and pertinent information included in HPI. Labs and imaging reviewed and results discussed with patient. Overall patient in no acute distress, presenting for chest pain and shortness of breath. Physical exam remarkable for diminished breath sounds.      Differential diagnosis includes but is not limited to: Pneumonia, pneumothorax, pleural effusion, ACS, CHF exacerbation, COPD exacerbation, asthma, pulmonary embolism, arrhythmia, anemia, Subarachnoid hemorrhage, intracranial hemorrhage, CVA, TIA, malignancy, hypoglycemia    Labs, EKG, and imaging obtained. Workup showed:  ED Course as of 08/04/22 2225   Tue Aug 02, 2022   0709 Blood gas shows no acidemia or hypercarbia [ER]   0710 COVID swab negative [ER]   0710 Stable to improved anemia at 10.5. No leukocytosis or thrombocytopenia [ER]   0710 CXR: IMPRESSION:  Similar appearance demonstrating unchanged left basilar opacity could represent left pleural fluid combined with infection or atelectasis. [ER]   0710 CT head shows no evidence of intracranial hemorrhage or other acute abnormality [ER]   0746 BNP is greater than 70,000, this is consistent with previous levels. [ER]   0747 No electrolyte abnormalities. Creatinine of 5.3, patient is on dialysis, this is consistent with his previous numbers [ER]   0748 Liver function testing unremarkable [ER]   0748 Troponin mildly elevated 0.09, this is consistent with his baseline [ER]   1004 Delta troponin stable [ER]   1145 Patient feels significantly improved after treatment in the emergency department. He feels well to go back to nursing home [ER]      ED Course User Index  [ER] Nichole Gruber MD       At this time I have low concern for pulmonary embolism. Patient has not had a previous blood clot. Patient denies other risk factors for pulmonary embolism. Patient does not have any evidence of DVT on exam.  Patient is low risk on PERC and Wells criteria. At this time, considering that risks associated with further work-up for pulmonary embolism outweigh the likelihood of this diagnosis. Is this patient to be included in the SEP-1 Core Measure due to severe sepsis or septic shock?    No   Exclusion criteria - the patient is NOT to be included for SEP-1 Core Measure due to:  2+ SIRS criteria are not met    During the patient's ED course, the patient was given:  Medications   furosemide (LASIX) injection 40 mg (40 mg IntraVENous Given 8/2/22 0728)   metoprolol succinate (TOPROL XL) extended release tablet 100 mg (100 mg Oral Given 8/2/22 0846)   oxyCODONE-acetaminophen (PERCOCET) 7.5-325 MG per tablet 1 tablet (1 tablet Oral Given 8/2/22 1011)   ipratropium-albuterol (DUONEB) nebulizer solution 1 ampule (1 ampule Inhalation Given 8/2/22 1029)      Patient cardiac values stable from previous baseline. Patient's symptoms improved with Lasix and DuoNeb. Suspect symptoms more consistent with heart failure and reactive airway disease then ACS. Patient's chest discomfort not consistent with ACS, and is been constant pain that has no palliative or provocative factors. Troponin at 3 hours and 6 hours after onset of pain has been stable and within patient's baseline. Patient has chronic severe heart failure, but there is no evidence of respiratory distress at this time, patient is on his baseline oxygen. Low suspicion for pneumonia given the chest x-ray is stable from previous, no leukocytosis chronic unchanged cough from baseline. Patient had some bilateral hand tingling earlier in the day that quickly resolved, low suspicion for TIA given bilateral symptoms. CT head showed no evidence of intracranial hemorrhage. Patient reports that symptoms have improved, he feels at his baseline. Completed shared decision-making with the patient, he feels comfortable with discharge at this time back to nursing facility. At this time, feel the patient is appropriate for discharge to follow-up with a primary care doctor. Return precautions given. Encouraged PCP follow-up in 1 day. Patient discharged in stable condition. I estimate there is LOW risk for ACUTE CORONARY SYNDROME, PULMONARY EMBOLISM, PNEUMOTHORAX, RUPTURED ESOPHAGUS OR THORACIC AORTIC DISSECTION, thus I consider the discharge disposition reasonable. We have discussed the diagnosis and risks, and we agree with discharging home with close follow-up. We also discussed returning to the Emergency Department immediately if new or worsening symptoms occur.  We have discussed the symptoms which are most concerning that necessitate immediate return. CLINICAL IMPRESSION  1. Chronic congestive heart failure, unspecified heart failure type ([address]    2. Chronic shortness of breath    3. Hypertension, unspecified type    4. ESRD on dialysis ([address]    5. Chronic pleural effusion        Blood pressure (!) 156/68, pulse 66, temperature 98.7 °F (37.1 °C), temperature source Oral, resp. rate 16, height 5' 9\" (1.753 m), weight 257 lb (116.6 kg), SpO2 98 %. DISPOSITION  Misty Pino was discharged to home in stable condition. Patient was given scripts for the following medications. I counseled patient how to take these medications. Discharge Medication List as of 8/2/2022  1:21 PM          Follow-up with:  Gypsy Garcia [address] [address]  [phone]    Call in 1 day      [facility]  ED  [address]  Go to   As needed, If symptoms worsen    DISCLAIMER: This chart was created using Dragon dictation software. Efforts were made by me to ensure accuracy, however some errors may be present due to limitations of this technology and occasionally words are not transcribed correctly.         Karoline Sevilla MD  08/04/22 0370

## 2022-08-03 LAB
EKG ATRIAL RATE: 68 BPM
EKG DIAGNOSIS: NORMAL
EKG P AXIS: 51 DEGREES
EKG P-R INTERVAL: 182 MS
EKG Q-T INTERVAL: 410 MS
EKG QRS DURATION: 92 MS
EKG QTC CALCULATION (BAZETT): 435 MS
EKG R AXIS: -2 DEGREES
EKG T AXIS: 127 DEGREES
EKG VENTRICULAR RATE: 68 BPM

## 2022-08-03 PROCEDURE — 93010 ELECTROCARDIOGRAM REPORT: CPT | Performed by: INTERNAL MEDICINE

## 2022-08-05 ENCOUNTER — TELEMEDICINE (OUTPATIENT)
Dept: FAMILY MEDICINE CLINIC | Age: 52
End: 2022-08-05
Payer: COMMERCIAL

## 2022-08-05 DIAGNOSIS — G89.4 CHRONIC PAIN SYNDROME: ICD-10-CM

## 2022-08-05 PROCEDURE — 1036F TOBACCO NON-USER: CPT | Performed by: FAMILY MEDICINE

## 2022-08-05 PROCEDURE — G8428 CUR MEDS NOT DOCUMENT: HCPCS | Performed by: FAMILY MEDICINE

## 2022-08-05 PROCEDURE — G8417 CALC BMI ABV UP PARAM F/U: HCPCS | Performed by: FAMILY MEDICINE

## 2022-08-05 PROCEDURE — 99214 OFFICE O/P EST MOD 30 MIN: CPT | Performed by: FAMILY MEDICINE

## 2022-08-05 PROCEDURE — 1111F DSCHRG MED/CURRENT MED MERGE: CPT | Performed by: FAMILY MEDICINE

## 2022-08-05 PROCEDURE — 3017F COLORECTAL CA SCREEN DOC REV: CPT | Performed by: FAMILY MEDICINE

## 2022-08-05 RX ORDER — OXYCODONE AND ACETAMINOPHEN 7.5; 325 MG/1; MG/1
1 TABLET ORAL EVERY 6 HOURS PRN
Qty: 120 TABLET | Refills: 0 | Status: SHIPPED | OUTPATIENT
Start: 2022-08-05 | End: 2022-09-04

## 2022-08-14 NOTE — PROGRESS NOTES
Stan Melton (:  1970) is a Established patient, here for evaluation of the following:    Assessment & Plan   Below is the assessment and plan developed based on review of pertinent history, physical exam, labs, studies, and medications. 1. Chronic pain syndrome  -     oxyCODONE-acetaminophen (PERCOCET) 7.5-325 MG per tablet; Take 1 tablet by mouth every 6 hours as needed (pain) for up to 30 days. , Disp-120 tablet, R-0Normal    No follow-ups on file. Subjective   dusty Tam is a 45 y.o. male. No chief complaint on file. This a 78-year-old male with chronic medical conditions including chronic pain syndrome. He is in the process of being excused from the practice due to missing too many appointments. He needs a refill on his pain medicines today. They have been working well for him. He denies any significant problems with his medications. He continues on dialysis. The patients PMH, surgical history, family history, medications, allergies were all reviewed and updated as appropriate today.       Review of Systems       Objective   Patient-Reported Vitals  No data recorded     Physical Exam  [INSTRUCTIONS:  \"[x]\" Indicates a positive item  \"[]\" Indicates a negative item  -- DELETE ALL ITEMS NOT EXAMINED]    Constitutional: [x] Appears well-developed and well-nourished [x] No apparent distress      [] Abnormal -     Mental status: [x] Alert and awake  [x] Oriented to person/place/time [x] Able to follow commands    [] Abnormal -     Eyes:   EOM    [x]  Normal    [] Abnormal -   Sclera  [x]  Normal    [] Abnormal -          Discharge [x]  None visible   [] Abnormal -     HENT: [x] Normocephalic, atraumatic  [] Abnormal -   [x] Mouth/Throat: Mucous membranes are moist    External Ears [x] Normal  [] Abnormal -    Neck: [x] No visualized mass [] Abnormal -     Pulmonary/Chest: [x] Respiratory effort normal   [x] No visualized signs of difficulty breathing or respiratory distress [] Abnormal -      Musculoskeletal:   [x] Normal gait with no signs of ataxia         [x] Normal range of motion of neck        [] Abnormal -     Neurological:        [x] No Facial Asymmetry (Cranial nerve 7 motor function) (limited exam due to video visit)          [x] No gaze palsy        [] Abnormal -          Skin:        [x] No significant exanthematous lesions or discoloration noted on facial skin         [] Abnormal -            Psychiatric:       [x] Normal Affect [] Abnormal -        [x] No Hallucinations    Other pertinent observable physical exam findings:-         On this date 8/5/2022 I have spent 30 minutes reviewing previous notes, test results and face to face (virtual) with the patient discussing the diagnosis and importance of compliance with the treatment plan as well as documenting on the day of the visit. Alpesh Bush, was evaluated through a synchronous (real-time) audio-video encounter. The patient (or guardian if applicable) is aware that this is a billable service, which includes applicable co-pays. This Virtual Visit was conducted with patient's (and/or legal guardian's) consent. The visit was conducted pursuant to the emergency declaration under the [facility], [address] [place] [address] authority and the [employer] Act. Patient identification was verified, and a caregiver was present when appropriate. The patient was located at Home: [address] [facility] [address] [address].    Provider was located at Home ([address]): Troy Logan MD

## 2022-08-25 RX ORDER — PRAVASTATIN SODIUM 40 MG
TABLET ORAL
Qty: 30 TABLET | Refills: 10 | OUTPATIENT
Start: 2022-08-25

## 2022-08-31 ENCOUNTER — TELEPHONE (OUTPATIENT)
Dept: FAMILY MEDICINE CLINIC | Age: 52
End: 2022-08-31

## 2022-08-31 RX ORDER — CARVEDILOL 12.5 MG/1
TABLET ORAL
Qty: 60 TABLET | Refills: 10 | OUTPATIENT
Start: 2022-08-31

## 2022-08-31 RX ORDER — PRAVASTATIN SODIUM 40 MG
TABLET ORAL
Qty: 30 TABLET | Refills: 10 | OUTPATIENT
Start: 2022-08-31

## 2022-08-31 RX ORDER — TRAZODONE HYDROCHLORIDE 150 MG/1
TABLET ORAL
Qty: 30 TABLET | Refills: 10 | OUTPATIENT
Start: 2022-08-31

## 2022-08-31 NOTE — TELEPHONE ENCOUNTER
----- Message from Juan Almodovar sent at 8/31/2022 12:55 PM EDT -----  Subject: Appointment Request    Reason for Call: Established Patient Appointment needed: Routine Existing   Condition Follow Up (Diabetes)    QUESTIONS    Reason for appointment request? Other - No Provider to Schedule      Additional Information for Provider?  Please call to schedule a virtual   visit with Dr. Jose Alberts  ---------------------------------------------------------------------------  --------------  [address] [employer]  [phone]; Do not leave any message, patient will call back for answer  ---------------------------------------------------------------------------  --------------  SCRIPT ANSWERS  JUSTYNA Screen: Tennille Arellano

## 2022-09-01 NOTE — TELEPHONE ENCOUNTER
9/1 PM left msg on voicemail - left 2 actually because wasn't sure if 1st msg took - informing him that we are no longer his pcp and can not refill his meds or see him as a pt-  reminded him that we spoke back on 8/2 and PM informed him of this then - suggested a month ago to start finding a new pcp - informed mr reeves to call PM back with any questions.

## 2022-09-01 NOTE — TELEPHONE ENCOUNTER
9/1 pt called back and thought we were going to refer him to [facility] - checked with MB and pt was supposed to call them. PM called pt back and gave him the [facility] location ph # for [facility].

## 2022-09-09 NOTE — PROGRESS NOTES
06/23/22 8510   Oxygen Therapy/Pulse Ox   O2 Device Nasal cannula   O2 Flow Rate (L/min) 2 L/min   SpO2 97 % no

## 2022-09-15 RX ORDER — CITALOPRAM 40 MG/1
TABLET ORAL
Qty: 30 TABLET | Refills: 10 | OUTPATIENT
Start: 2022-09-15

## 2022-09-15 RX ORDER — PRAVASTATIN SODIUM 40 MG
TABLET ORAL
Qty: 30 TABLET | Refills: 10 | OUTPATIENT
Start: 2022-09-15

## 2022-09-15 RX ORDER — CARVEDILOL 12.5 MG/1
TABLET ORAL
Qty: 60 TABLET | Refills: 10 | OUTPATIENT
Start: 2022-09-15

## 2022-09-15 RX ORDER — TRAZODONE HYDROCHLORIDE 150 MG/1
TABLET ORAL
Qty: 30 TABLET | Refills: 10 | OUTPATIENT
Start: 2022-09-15

## 2022-09-23 ENCOUNTER — APPOINTMENT (OUTPATIENT)
Dept: GENERAL RADIOLOGY | Age: 52
DRG: 291 | End: 2022-09-23
Payer: MEDICARE

## 2022-09-23 ENCOUNTER — HOSPITAL ENCOUNTER (INPATIENT)
Age: 52
LOS: 12 days | Discharge: HOME | DRG: 291 | End: 2022-10-05
Attending: EMERGENCY MEDICINE | Admitting: INTERNAL MEDICINE
Payer: MEDICARE

## 2022-09-23 DIAGNOSIS — J18.9 PNEUMONIA OF LEFT LOWER LOBE DUE TO INFECTIOUS ORGANISM: ICD-10-CM

## 2022-09-23 DIAGNOSIS — J96.20 ACUTE ON CHRONIC RESPIRATORY FAILURE, UNSPECIFIED WHETHER WITH HYPOXIA OR HYPERCAPNIA (HCC): Primary | ICD-10-CM

## 2022-09-23 DIAGNOSIS — I10 UNCONTROLLED HYPERTENSION: ICD-10-CM

## 2022-09-23 DIAGNOSIS — J90 PLEURAL EFFUSION ON LEFT: ICD-10-CM

## 2022-09-23 PROBLEM — J96.01 ACUTE RESPIRATORY FAILURE WITH HYPOXIA AND HYPERCAPNIA (HCC): Status: ACTIVE | Noted: 2022-09-23

## 2022-09-23 PROBLEM — J96.02 ACUTE RESPIRATORY FAILURE WITH HYPOXIA AND HYPERCAPNIA (HCC): Status: ACTIVE | Noted: 2022-09-23

## 2022-09-23 LAB
A/G RATIO: 1.5 (ref 1.1–2.2)
ALBUMIN SERPL-MCNC: 4.6 G/DL (ref 3.4–5)
ALP BLD-CCNC: 100 U/L (ref 40–129)
ALT SERPL-CCNC: 11 U/L (ref 10–40)
ANION GAP SERPL CALCULATED.3IONS-SCNC: 18 MMOL/L (ref 3–16)
AST SERPL-CCNC: 19 U/L (ref 15–37)
BASE EXCESS ARTERIAL: -1 MMOL/L (ref -3–3)
BASE EXCESS VENOUS: -1.7 MMOL/L (ref -3–3)
BASOPHILS ABSOLUTE: 0.1 K/UL (ref 0–0.2)
BASOPHILS RELATIVE PERCENT: 1 %
BILIRUB SERPL-MCNC: 0.4 MG/DL (ref 0–1)
BUN BLDV-MCNC: 55 MG/DL (ref 7–20)
CALCIUM SERPL-MCNC: 9 MG/DL (ref 8.3–10.6)
CARBOXYHEMOGLOBIN ARTERIAL: 0.4 % (ref 0–1.5)
CARBOXYHEMOGLOBIN: 3.2 % (ref 0–1.5)
CHLORIDE BLD-SCNC: 91 MMOL/L (ref 99–110)
CO2: 25 MMOL/L (ref 21–32)
CREAT SERPL-MCNC: 6.6 MG/DL (ref 0.9–1.3)
EOSINOPHILS ABSOLUTE: 0.2 K/UL (ref 0–0.6)
EOSINOPHILS RELATIVE PERCENT: 1.6 %
GFR AFRICAN AMERICAN: 11
GFR NON-AFRICAN AMERICAN: 9
GLUCOSE BLD-MCNC: 120 MG/DL (ref 70–99)
GLUCOSE BLD-MCNC: 201 MG/DL (ref 70–99)
HCO3 ARTERIAL: 22.1 MMOL/L (ref 21–29)
HCO3 VENOUS: 23.7 MMOL/L (ref 23–29)
HCT VFR BLD CALC: 37.8 % (ref 40.5–52.5)
HEMOGLOBIN, ART, EXTENDED: 12.3 G/DL (ref 13.5–17.5)
HEMOGLOBIN: 12.3 G/DL (ref 13.5–17.5)
LACTIC ACID: 1.3 MMOL/L (ref 0.4–2)
LYMPHOCYTES ABSOLUTE: 0.4 K/UL (ref 1–5.1)
LYMPHOCYTES RELATIVE PERCENT: 4.5 %
MAGNESIUM: 2.1 MG/DL (ref 1.8–2.4)
MCH RBC QN AUTO: 28.6 PG (ref 26–34)
MCHC RBC AUTO-ENTMCNC: 32.5 G/DL (ref 31–36)
MCV RBC AUTO: 88 FL (ref 80–100)
METHEMOGLOBIN ARTERIAL: 0.2 %
METHEMOGLOBIN VENOUS: 0.4 %
MONOCYTES ABSOLUTE: 0.3 K/UL (ref 0–1.3)
MONOCYTES RELATIVE PERCENT: 3.5 %
NEUTROPHILS ABSOLUTE: 8.5 K/UL (ref 1.7–7.7)
NEUTROPHILS RELATIVE PERCENT: 89.4 %
O2 SAT, ARTERIAL: 99.4 %
O2 SAT, VEN: 98 %
O2 THERAPY: ABNORMAL
O2 THERAPY: ABNORMAL
PCO2 ARTERIAL: 32 MMHG (ref 35–45)
PCO2, VEN: 42.5 MMHG (ref 40–50)
PDW BLD-RTO: 16.5 % (ref 12.4–15.4)
PERFORMED ON: ABNORMAL
PH ARTERIAL: 7.46 (ref 7.35–7.45)
PH VENOUS: 7.36 (ref 7.35–7.45)
PLATELET # BLD: 323 K/UL (ref 135–450)
PMV BLD AUTO: 7.6 FL (ref 5–10.5)
PO2 ARTERIAL: 395.1 MMHG (ref 75–108)
PO2, VEN: 125 MMHG (ref 25–40)
POTASSIUM SERPL-SCNC: 4.5 MMOL/L (ref 3.5–5.1)
PRO-BNP: ABNORMAL PG/ML (ref 0–124)
PROCALCITONIN: 0.46 NG/ML (ref 0–0.15)
RBC # BLD: 4.29 M/UL (ref 4.2–5.9)
SARS-COV-2, NAAT: NOT DETECTED
SODIUM BLD-SCNC: 134 MMOL/L (ref 136–145)
SPECIMEN STATUS: NORMAL
TCO2 ARTERIAL: 23.1 MMOL/L
TCO2 CALC VENOUS: 25 MMOL/L
TOTAL PROTEIN: 7.7 G/DL (ref 6.4–8.2)
TROPONIN: 0.14 NG/ML
WBC # BLD: 9.5 K/UL (ref 4–11)

## 2022-09-23 PROCEDURE — 94761 N-INVAS EAR/PLS OXIMETRY MLT: CPT

## 2022-09-23 PROCEDURE — 36415 COLL VENOUS BLD VENIPUNCTURE: CPT

## 2022-09-23 PROCEDURE — 85025 COMPLETE CBC W/AUTO DIFF WBC: CPT

## 2022-09-23 PROCEDURE — 2700000000 HC OXYGEN THERAPY PER DAY

## 2022-09-23 PROCEDURE — 83605 ASSAY OF LACTIC ACID: CPT

## 2022-09-23 PROCEDURE — 80053 COMPREHEN METABOLIC PANEL: CPT

## 2022-09-23 PROCEDURE — 84145 PROCALCITONIN (PCT): CPT

## 2022-09-23 PROCEDURE — 82803 BLOOD GASES ANY COMBINATION: CPT

## 2022-09-23 PROCEDURE — 71045 X-RAY EXAM CHEST 1 VIEW: CPT

## 2022-09-23 PROCEDURE — 94660 CPAP INITIATION&MGMT: CPT

## 2022-09-23 PROCEDURE — 96374 THER/PROPH/DIAG INJ IV PUSH: CPT

## 2022-09-23 PROCEDURE — 6360000002 HC RX W HCPCS: Performed by: EMERGENCY MEDICINE

## 2022-09-23 PROCEDURE — 93005 ELECTROCARDIOGRAM TRACING: CPT | Performed by: EMERGENCY MEDICINE

## 2022-09-23 PROCEDURE — 87635 SARS-COV-2 COVID-19 AMP PRB: CPT

## 2022-09-23 PROCEDURE — 84484 ASSAY OF TROPONIN QUANT: CPT

## 2022-09-23 PROCEDURE — 99285 EMERGENCY DEPT VISIT HI MDM: CPT

## 2022-09-23 PROCEDURE — 83735 ASSAY OF MAGNESIUM: CPT

## 2022-09-23 PROCEDURE — 6370000000 HC RX 637 (ALT 250 FOR IP): Performed by: EMERGENCY MEDICINE

## 2022-09-23 PROCEDURE — 87040 BLOOD CULTURE FOR BACTERIA: CPT

## 2022-09-23 PROCEDURE — 83880 ASSAY OF NATRIURETIC PEPTIDE: CPT

## 2022-09-23 PROCEDURE — 2060000000 HC ICU INTERMEDIATE R&B

## 2022-09-23 RX ORDER — BUMETANIDE 0.25 MG/ML
2 INJECTION, SOLUTION INTRAMUSCULAR; INTRAVENOUS EVERY 12 HOURS
Status: DISCONTINUED | OUTPATIENT
Start: 2022-09-24 | End: 2022-09-26

## 2022-09-23 RX ORDER — FUROSEMIDE 10 MG/ML
60 INJECTION INTRAMUSCULAR; INTRAVENOUS ONCE
Status: COMPLETED | OUTPATIENT
Start: 2022-09-23 | End: 2022-09-23

## 2022-09-23 RX ORDER — POTASSIUM CHLORIDE 7.45 MG/ML
10 INJECTION INTRAVENOUS PRN
Status: DISCONTINUED | OUTPATIENT
Start: 2022-09-23 | End: 2022-09-24

## 2022-09-23 RX ORDER — ACETAMINOPHEN 650 MG/1
650 SUPPOSITORY RECTAL EVERY 6 HOURS PRN
Status: DISCONTINUED | OUTPATIENT
Start: 2022-09-23 | End: 2022-10-05 | Stop reason: HOSPADM

## 2022-09-23 RX ORDER — MAGNESIUM SULFATE IN WATER 40 MG/ML
2000 INJECTION, SOLUTION INTRAVENOUS PRN
Status: DISCONTINUED | OUTPATIENT
Start: 2022-09-23 | End: 2022-09-24

## 2022-09-23 RX ORDER — SODIUM CHLORIDE 9 MG/ML
INJECTION, SOLUTION INTRAVENOUS PRN
Status: DISCONTINUED | OUTPATIENT
Start: 2022-09-23 | End: 2022-10-05 | Stop reason: HOSPADM

## 2022-09-23 RX ORDER — SODIUM CHLORIDE 0.9 % (FLUSH) 0.9 %
10 SYRINGE (ML) INJECTION PRN
Status: DISCONTINUED | OUTPATIENT
Start: 2022-09-23 | End: 2022-10-05 | Stop reason: HOSPADM

## 2022-09-23 RX ORDER — OXYCODONE HYDROCHLORIDE 5 MG/1
10 TABLET ORAL EVERY 4 HOURS PRN
Status: COMPLETED | OUTPATIENT
Start: 2022-09-23 | End: 2022-09-24

## 2022-09-23 RX ORDER — SODIUM CHLORIDE 0.9 % (FLUSH) 0.9 %
10 SYRINGE (ML) INJECTION EVERY 12 HOURS SCHEDULED
Status: DISCONTINUED | OUTPATIENT
Start: 2022-09-23 | End: 2022-10-05 | Stop reason: HOSPADM

## 2022-09-23 RX ORDER — PROMETHAZINE HYDROCHLORIDE 25 MG/1
12.5 TABLET ORAL EVERY 6 HOURS PRN
Status: DISCONTINUED | OUTPATIENT
Start: 2022-09-23 | End: 2022-10-05 | Stop reason: HOSPADM

## 2022-09-23 RX ORDER — OXYCODONE HYDROCHLORIDE 5 MG/1
5 TABLET ORAL EVERY 4 HOURS PRN
Status: DISCONTINUED | OUTPATIENT
Start: 2022-09-23 | End: 2022-09-25

## 2022-09-23 RX ORDER — DEXTROSE MONOHYDRATE 100 MG/ML
INJECTION, SOLUTION INTRAVENOUS CONTINUOUS PRN
Status: DISCONTINUED | OUTPATIENT
Start: 2022-09-23 | End: 2022-10-05 | Stop reason: HOSPADM

## 2022-09-23 RX ORDER — ACETAMINOPHEN 325 MG/1
650 TABLET ORAL EVERY 6 HOURS PRN
Status: DISCONTINUED | OUTPATIENT
Start: 2022-09-23 | End: 2022-10-05 | Stop reason: HOSPADM

## 2022-09-23 RX ORDER — INSULIN LISPRO 100 [IU]/ML
0-4 INJECTION, SOLUTION INTRAVENOUS; SUBCUTANEOUS EVERY 4 HOURS
Status: DISCONTINUED | OUTPATIENT
Start: 2022-09-23 | End: 2022-09-25

## 2022-09-23 RX ORDER — BUMETANIDE 0.25 MG/ML
1 INJECTION, SOLUTION INTRAMUSCULAR; INTRAVENOUS ONCE
Status: DISCONTINUED | OUTPATIENT
Start: 2022-09-23 | End: 2022-09-24

## 2022-09-23 RX ADMIN — NITROGLYCERIN 0.5 INCH: 20 OINTMENT TOPICAL at 18:22

## 2022-09-23 RX ADMIN — FUROSEMIDE 60 MG: 10 INJECTION, SOLUTION INTRAMUSCULAR; INTRAVENOUS at 18:20

## 2022-09-23 ASSESSMENT — PAIN - FUNCTIONAL ASSESSMENT
PAIN_FUNCTIONAL_ASSESSMENT: NONE - DENIES PAIN

## 2022-09-23 NOTE — PROGRESS NOTES
09/23/22 1700   NIV Type   Skin Assessment Clean, dry, & intact   Skin Protection for O2 Device Yes   Location Nose   Intervention(s) Skin Barrier   NIV Started/Stopped On   Equipment Type V60   Mode Bilevel   Mask Type Full face mask   Mask Size Large   Settings/Measurements   PIP Observed 20 cm H20   IPAP 18 cmH20   CPAP/EPAP 6 cmH2O   Rate Ordered 14   Resp 20   FiO2  100 %   Minute Volume (L/min) 19.1 Liters   Mask Leak (lpm) 41 lpm   Comfort Level Good   Using Accessory Muscles Yes   SpO2 98   Alarm Settings   Alarms On Y

## 2022-09-24 PROBLEM — I50.20 HFREF (HEART FAILURE WITH REDUCED EJECTION FRACTION) (HCC): Status: ACTIVE | Noted: 2022-09-24

## 2022-09-24 LAB
A/G RATIO: 1.4 (ref 1.1–2.2)
ALBUMIN SERPL-MCNC: 3.9 G/DL (ref 3.4–5)
ALP BLD-CCNC: 83 U/L (ref 40–129)
ALT SERPL-CCNC: 8 U/L (ref 10–40)
ANION GAP SERPL CALCULATED.3IONS-SCNC: 20 MMOL/L (ref 3–16)
AST SERPL-CCNC: 12 U/L (ref 15–37)
BASOPHILS ABSOLUTE: 0.1 K/UL (ref 0–0.2)
BASOPHILS RELATIVE PERCENT: 0.9 %
BILIRUB SERPL-MCNC: 0.3 MG/DL (ref 0–1)
BUN BLDV-MCNC: 66 MG/DL (ref 7–20)
CALCIUM SERPL-MCNC: 8.7 MG/DL (ref 8.3–10.6)
CHLORIDE BLD-SCNC: 94 MMOL/L (ref 99–110)
CO2: 21 MMOL/L (ref 21–32)
CREAT SERPL-MCNC: 7.6 MG/DL (ref 0.9–1.3)
EKG ATRIAL RATE: 110 BPM
EKG DIAGNOSIS: NORMAL
EKG P AXIS: 34 DEGREES
EKG P-R INTERVAL: 126 MS
EKG Q-T INTERVAL: 356 MS
EKG QRS DURATION: 92 MS
EKG QTC CALCULATION (BAZETT): 481 MS
EKG R AXIS: 4 DEGREES
EKG T AXIS: 123 DEGREES
EKG VENTRICULAR RATE: 110 BPM
EOSINOPHILS ABSOLUTE: 0.3 K/UL (ref 0–0.6)
EOSINOPHILS RELATIVE PERCENT: 3.7 %
GFR AFRICAN AMERICAN: 9
GFR NON-AFRICAN AMERICAN: 7
GLUCOSE BLD-MCNC: 114 MG/DL (ref 70–99)
GLUCOSE BLD-MCNC: 133 MG/DL (ref 70–99)
GLUCOSE BLD-MCNC: 137 MG/DL (ref 70–99)
GLUCOSE BLD-MCNC: 144 MG/DL (ref 70–99)
HCT VFR BLD CALC: 33.7 % (ref 40.5–52.5)
HEMOGLOBIN: 10.9 G/DL (ref 13.5–17.5)
LYMPHOCYTES ABSOLUTE: 0.8 K/UL (ref 1–5.1)
LYMPHOCYTES RELATIVE PERCENT: 9.4 %
MCH RBC QN AUTO: 28.5 PG (ref 26–34)
MCHC RBC AUTO-ENTMCNC: 32.2 G/DL (ref 31–36)
MCV RBC AUTO: 88.6 FL (ref 80–100)
MONOCYTES ABSOLUTE: 0.5 K/UL (ref 0–1.3)
MONOCYTES RELATIVE PERCENT: 6.5 %
NEUTROPHILS ABSOLUTE: 6.6 K/UL (ref 1.7–7.7)
NEUTROPHILS RELATIVE PERCENT: 79.5 %
PDW BLD-RTO: 16 % (ref 12.4–15.4)
PERFORMED ON: ABNORMAL
PLATELET # BLD: 257 K/UL (ref 135–450)
PMV BLD AUTO: 8.2 FL (ref 5–10.5)
POTASSIUM REFLEX MAGNESIUM: 4.7 MMOL/L (ref 3.5–5.1)
RBC # BLD: 3.81 M/UL (ref 4.2–5.9)
SODIUM BLD-SCNC: 135 MMOL/L (ref 136–145)
TOTAL PROTEIN: 6.7 G/DL (ref 6.4–8.2)
TROPONIN: 0.15 NG/ML
WBC # BLD: 8.3 K/UL (ref 4–11)

## 2022-09-24 PROCEDURE — 94761 N-INVAS EAR/PLS OXIMETRY MLT: CPT

## 2022-09-24 PROCEDURE — 5A1D70Z PERFORMANCE OF URINARY FILTRATION, INTERMITTENT, LESS THAN 6 HOURS PER DAY: ICD-10-PCS | Performed by: INTERNAL MEDICINE

## 2022-09-24 PROCEDURE — 6370000000 HC RX 637 (ALT 250 FOR IP): Performed by: INTERNAL MEDICINE

## 2022-09-24 PROCEDURE — 83036 HEMOGLOBIN GLYCOSYLATED A1C: CPT

## 2022-09-24 PROCEDURE — 2060000000 HC ICU INTERMEDIATE R&B

## 2022-09-24 PROCEDURE — 90935 HEMODIALYSIS ONE EVALUATION: CPT

## 2022-09-24 PROCEDURE — 85025 COMPLETE CBC W/AUTO DIFF WBC: CPT

## 2022-09-24 PROCEDURE — 84484 ASSAY OF TROPONIN QUANT: CPT

## 2022-09-24 PROCEDURE — 99223 1ST HOSP IP/OBS HIGH 75: CPT | Performed by: INTERNAL MEDICINE

## 2022-09-24 PROCEDURE — 94660 CPAP INITIATION&MGMT: CPT

## 2022-09-24 PROCEDURE — 36415 COLL VENOUS BLD VENIPUNCTURE: CPT

## 2022-09-24 PROCEDURE — 93010 ELECTROCARDIOGRAM REPORT: CPT | Performed by: INTERNAL MEDICINE

## 2022-09-24 PROCEDURE — 6360000002 HC RX W HCPCS

## 2022-09-24 PROCEDURE — 2580000003 HC RX 258: Performed by: INTERNAL MEDICINE

## 2022-09-24 PROCEDURE — 94640 AIRWAY INHALATION TREATMENT: CPT

## 2022-09-24 PROCEDURE — 80053 COMPREHEN METABOLIC PANEL: CPT

## 2022-09-24 PROCEDURE — 2700000000 HC OXYGEN THERAPY PER DAY

## 2022-09-24 PROCEDURE — 2500000003 HC RX 250 WO HCPCS: Performed by: INTERNAL MEDICINE

## 2022-09-24 RX ORDER — CYCLOBENZAPRINE HCL 10 MG
5 TABLET ORAL 3 TIMES DAILY PRN
Status: DISCONTINUED | OUTPATIENT
Start: 2022-09-24 | End: 2022-10-05 | Stop reason: HOSPADM

## 2022-09-24 RX ORDER — ALBUTEROL SULFATE 90 UG/1
2 AEROSOL, METERED RESPIRATORY (INHALATION) EVERY 6 HOURS PRN
Status: DISCONTINUED | OUTPATIENT
Start: 2022-09-24 | End: 2022-10-05 | Stop reason: HOSPADM

## 2022-09-24 RX ORDER — PRAVASTATIN SODIUM 40 MG
40 TABLET ORAL DAILY
Status: DISCONTINUED | OUTPATIENT
Start: 2022-09-24 | End: 2022-10-05 | Stop reason: HOSPADM

## 2022-09-24 RX ORDER — CHOLECALCIFEROL (VITAMIN D3) 10 MCG
1 TABLET ORAL DAILY
Status: DISCONTINUED | OUTPATIENT
Start: 2022-09-24 | End: 2022-10-05 | Stop reason: HOSPADM

## 2022-09-24 RX ORDER — METOPROLOL SUCCINATE 50 MG/1
100 TABLET, EXTENDED RELEASE ORAL 2 TIMES DAILY
Status: DISCONTINUED | OUTPATIENT
Start: 2022-09-24 | End: 2022-10-05 | Stop reason: HOSPADM

## 2022-09-24 RX ORDER — ONDANSETRON 2 MG/ML
INJECTION INTRAMUSCULAR; INTRAVENOUS
Status: COMPLETED
Start: 2022-09-24 | End: 2022-09-24

## 2022-09-24 RX ORDER — DULOXETIN HYDROCHLORIDE 30 MG/1
30 CAPSULE, DELAYED RELEASE ORAL DAILY
Status: DISCONTINUED | OUTPATIENT
Start: 2022-09-24 | End: 2022-10-05 | Stop reason: HOSPADM

## 2022-09-24 RX ORDER — QUETIAPINE FUMARATE 25 MG/1
50 TABLET, FILM COATED ORAL NIGHTLY
Status: DISCONTINUED | OUTPATIENT
Start: 2022-09-24 | End: 2022-10-05 | Stop reason: HOSPADM

## 2022-09-24 RX ORDER — CLOPIDOGREL BISULFATE 75 MG/1
75 TABLET ORAL DAILY
Status: DISCONTINUED | OUTPATIENT
Start: 2022-09-24 | End: 2022-10-05 | Stop reason: HOSPADM

## 2022-09-24 RX ORDER — IPRATROPIUM BROMIDE AND ALBUTEROL SULFATE 2.5; .5 MG/3ML; MG/3ML
1 SOLUTION RESPIRATORY (INHALATION) EVERY 6 HOURS PRN
Status: DISCONTINUED | OUTPATIENT
Start: 2022-09-24 | End: 2022-10-05 | Stop reason: HOSPADM

## 2022-09-24 RX ORDER — ISOSORBIDE DINITRATE 20 MG/1
20 TABLET ORAL 3 TIMES DAILY
Status: DISCONTINUED | OUTPATIENT
Start: 2022-09-24 | End: 2022-10-05 | Stop reason: HOSPADM

## 2022-09-24 RX ORDER — ASPIRIN 81 MG/1
81 TABLET ORAL ONCE
Status: COMPLETED | OUTPATIENT
Start: 2022-09-24 | End: 2022-09-24

## 2022-09-24 RX ORDER — DOCUSATE SODIUM 100 MG/1
100 CAPSULE, LIQUID FILLED ORAL DAILY
Status: DISCONTINUED | OUTPATIENT
Start: 2022-09-24 | End: 2022-10-05 | Stop reason: HOSPADM

## 2022-09-24 RX ORDER — ONDANSETRON 2 MG/ML
8 INJECTION INTRAMUSCULAR; INTRAVENOUS ONCE
Status: COMPLETED | OUTPATIENT
Start: 2022-09-24 | End: 2022-09-24

## 2022-09-24 RX ORDER — PANTOPRAZOLE SODIUM 40 MG/1
40 TABLET, DELAYED RELEASE ORAL
Status: DISCONTINUED | OUTPATIENT
Start: 2022-09-24 | End: 2022-10-05 | Stop reason: HOSPADM

## 2022-09-24 RX ADMIN — ONDANSETRON 8 MG: 2 INJECTION INTRAMUSCULAR; INTRAVENOUS at 17:09

## 2022-09-24 RX ADMIN — APIXABAN 5 MG: 5 TABLET, FILM COATED ORAL at 09:34

## 2022-09-24 RX ADMIN — PANTOPRAZOLE SODIUM 40 MG: 40 TABLET, DELAYED RELEASE ORAL at 09:35

## 2022-09-24 RX ADMIN — PRAVASTATIN SODIUM 40 MG: 40 TABLET ORAL at 09:34

## 2022-09-24 RX ADMIN — NEPHROCAP 1 MG: 1 CAP ORAL at 18:15

## 2022-09-24 RX ADMIN — OXYCODONE HYDROCHLORIDE 10 MG: 5 TABLET ORAL at 18:15

## 2022-09-24 RX ADMIN — SODIUM CHLORIDE, PRESERVATIVE FREE 10 ML: 5 INJECTION INTRAVENOUS at 21:41

## 2022-09-24 RX ADMIN — TIOTROPIUM BROMIDE AND OLODATEROL 2 PUFF: 3.124; 2.736 SPRAY, METERED RESPIRATORY (INHALATION) at 08:46

## 2022-09-24 RX ADMIN — ASPIRIN 81 MG: 81 TABLET, COATED ORAL at 18:15

## 2022-09-24 RX ADMIN — METOPROLOL SUCCINATE 100 MG: 50 TABLET, EXTENDED RELEASE ORAL at 11:35

## 2022-09-24 RX ADMIN — CLOPIDOGREL BISULFATE 75 MG: 75 TABLET ORAL at 09:35

## 2022-09-24 RX ADMIN — METOPROLOL SUCCINATE 100 MG: 50 TABLET, EXTENDED RELEASE ORAL at 21:40

## 2022-09-24 RX ADMIN — PROMETHAZINE HYDROCHLORIDE 12.5 MG: 25 TABLET ORAL at 11:34

## 2022-09-24 RX ADMIN — OXYCODONE HYDROCHLORIDE 10 MG: 5 TABLET ORAL at 03:25

## 2022-09-24 RX ADMIN — ISOSORBIDE DINITRATE 20 MG: 20 TABLET ORAL at 21:40

## 2022-09-24 RX ADMIN — OXYCODONE HYDROCHLORIDE 10 MG: 5 TABLET ORAL at 09:34

## 2022-09-24 RX ADMIN — DULOXETINE HYDROCHLORIDE 30 MG: 30 CAPSULE, DELAYED RELEASE ORAL at 09:35

## 2022-09-24 RX ADMIN — QUETIAPINE FUMARATE 50 MG: 25 TABLET ORAL at 21:40

## 2022-09-24 RX ADMIN — BUMETANIDE 2 MG: 0.25 INJECTION, SOLUTION INTRAMUSCULAR; INTRAVENOUS at 09:41

## 2022-09-24 RX ADMIN — BUMETANIDE 2 MG: 0.25 INJECTION, SOLUTION INTRAMUSCULAR; INTRAVENOUS at 21:39

## 2022-09-24 RX ADMIN — SODIUM CHLORIDE, PRESERVATIVE FREE 10 ML: 5 INJECTION INTRAVENOUS at 09:43

## 2022-09-24 ASSESSMENT — PAIN DESCRIPTION - ORIENTATION: ORIENTATION: LOWER;RIGHT;LEFT

## 2022-09-24 ASSESSMENT — PAIN SCALES - GENERAL
PAINLEVEL_OUTOF10: 9
PAINLEVEL_OUTOF10: 9
PAINLEVEL_OUTOF10: 7
PAINLEVEL_OUTOF10: 0
PAINLEVEL_OUTOF10: 9
PAINLEVEL_OUTOF10: 0
PAINLEVEL_OUTOF10: 9

## 2022-09-24 ASSESSMENT — PAIN DESCRIPTION - LOCATION
LOCATION: BACK;HIP
LOCATION: BACK;HIP

## 2022-09-24 NOTE — CONSULTS
Patient seen and examined, consult note dictated. Assessment and Plan:    1- ESRD: We will arrange for hemodialysis today. 2- No significant electrolytes disorders noted. 3- HTN: Blood pressure elevated, will re-assess after Ultrafiltration. 4- Anemia: Stable hemoglobin, monitor.

## 2022-09-24 NOTE — PROGRESS NOTES
09/24/22 0315   NIV Type   Equipment Type v60   Mode Bilevel   Mask Type Full face mask   Mask Size Large   Settings/Measurements   PIP Observed 20 cm H20   IPAP 18 cmH20   CPAP/EPAP 6 cmH2O   Vt (Measured) 670 mL   Rate Ordered 14   Resp 25   FiO2  45 %  (decreased to 40%)   I Time/ I Time % 1 s   Minute Volume (L/min) 17.7 Liters   Mask Leak (lpm) 27 lpm   Comfort Level Good   Using Accessory Muscles No   SpO2 100   Patient's Home Machine No   Alarm Settings   Alarms On Y   Low Pressure (cmH2O) 6 cmH2O   High Pressure  30 cmH2O   Apnea (secs) 20 secs   RR Low (bpm) 6   RR High (bpm) 40 br/min

## 2022-09-24 NOTE — PROGRESS NOTES
09/24/22 0017   NIV Type   $NIV $Daily Charge   Skin Protection for O2 Device No  (pt does not want mepilex)   Equipment Type v60   Mode Bilevel   Mask Type Full face mask   Mask Size Large   Settings/Measurements   PIP Observed 18 cm H20   IPAP 18 cmH20   CPAP/EPAP 6 cmH2O   Vt (Measured) 534 mL   Rate Ordered 14   Resp 28   FiO2  50 %  (decreased to 45%)   I Time/ I Time % 1 s   Minute Volume (L/min) 20.5 Liters   Mask Leak (lpm) 37 lpm   Comfort Level Good   Using Accessory Muscles No   SpO2 99  (pt placed on pulse ox at bedside)   Patient's Home Machine No   Alarm Settings   Alarms On Y   Low Pressure (cmH2O) 6 cmH2O   High Pressure  30 cmH2O   Apnea (secs) 20 secs   RR Low (bpm) 6   RR High (bpm) 40 br/min   Oxygen Therapy/Pulse Ox   Heart Rate 100   SpO2 100 %

## 2022-09-24 NOTE — RT PROTOCOL NOTE
RT Nebulizer Bronchodilator Protocol Note    There is a bronchodilator order in the chart from a provider indicating to follow the RT Bronchodilator Protocol and there is an Initiate RT Bronchodilator Protocol order as well (see protocol at bottom of note). CXR Findings:  XR CHEST PORTABLE    Result Date: 9/23/2022  Moderate left pleural effusion with associated left lower lobe airspace opacities. The findings from the last RT Protocol Assessment were as follows:  Smoking: Chronic pulmonary disease  Respiratory Pattern: Regular pattern and RR 12-20 bpm  Breath Sounds: Slightly diminished and/or crackles  Cough: Strong, spontaneous, non-productive  Indication for Bronchodilator Therapy: None, On home bronchodilators  Bronchodilator Assessment Score: 4    Aerosolized bronchodilator medication orders have been revised according to the RT Nebulizer Bronchodilator Protocol below. Respiratory Therapist to perform RT Therapy Protocol Assessment initially then follow the protocol. Repeat RT Therapy Protocol Assessment PRN for score 0-3 or on second treatment, BID, and PRN for scores above 3. No Indications - adjust the frequency to every 6 hours PRN wheezing or bronchospasm, if no treatments needed after 48 hours then discontinue using Per Protocol order mode. If indication present, adjust the RT bronchodilator orders based on the Bronchodilator Assessment Score as indicated below. If a patient is on this medication at home then do not decrease Frequency below that used at home. 0-3 - enter or revise RT bronchodilator order(s) to equivalent RT Bronchodilator order with Frequency of every 4 hours PRN for wheezing or increased work of breathing using Per Protocol order mode.        4-6 - enter or revise RT Bronchodilator order(s) to two equivalent RT bronchodilator orders with one order with BID Frequency and one order with Frequency of every 4 hours PRN wheezing or increased work of breathing using Per Protocol order mode. 7-10 - enter or revise RT Bronchodilator order(s) to two equivalent RT bronchodilator orders with one order with TID Frequency and one order with Frequency of every 4 hours PRN wheezing or increased work of breathing using Per Protocol order mode. 11-13 - enter or revise RT Bronchodilator order(s) to one equivalent RT bronchodilator order with QID Frequency and an Albuterol order with Frequency of every 4 hours PRN wheezing or increased work of breathing using Per Protocol order mode. Greater than 13 - enter or revise RT Bronchodilator order(s) to one equivalent RT bronchodilator order with every 4 hours Frequency and an Albuterol order with Frequency of every 2 hours PRN wheezing or increased work of breathing using Per Protocol order mode. RT to enter RT Home Evaluation for COPD & MDI Assessment order using Per Protocol order mode.     Electronically signed by Reno Medel RCP on 9/24/2022 at 12:25 PM

## 2022-09-24 NOTE — CONSULTS
[facility]   Cardiology Consultation   Date: 9/24/2022  Reason for Consultation:  Acute shortness of breath, congestive heart failure  Consult Requesting Physician: Xiang Gibson MD     Chief Complaint   Patient presents with    Respiratory Distress     Squad reporting pt called c/o SOB x 3 days. Only received 45min of dialysis today due to feeling SOB. Squad reports pt was tripoding upon their arrival with a RA sat of 96%. They report patient was not moving any air. 1 duoneb and 1 albuterol given in route to the ED along with BIPAP. HPI: Sukumar Hammer is a 45 y.o. male with history of ESRD on HD, diabetes, hypertension, CAD with history of PCI, ischemic cardiomyopathy, PAF, HFrEF presents to the hospital due to shortness of breath for 3 days. He was going to have dialysis on Friday but could not complete more than 45 minutes due to acute shortness of breath.       Past Medical History:   Diagnosis Date    Ambulatory dysfunction     walker for long distances, SOB with distance    Aortic stenosis     echo 2017    Arthritis     hands and hips    Asthma     Bilateral hilar adenopathy syndrome 6/3/2013    CAD (coronary artery disease)     Dr. Melony Lafleur [facility]) 04/19/2019    EF= 43%    CHF (congestive heart failure) ([address]     Chronic pain     COPD (chronic obstructive pulmonary disease) ([address]     pulmonology Dr. Rafa Levin    Depression     Diabetes mellitus ([address]     borderline    Difficult intravenous access     Emphysema of lung ([address]     ESRD (end stage renal disease) on dialysis ([address]     MWF    Fear of needles     Gastric ulcer     GERD (gastroesophageal reflux disease)     Heart valve problem     bicuspic valve    Hemodialysis patient ([address]     History of spinal fracture     work incident    Hx of blood clots     Bilateral lower extremities; stents in place    Hyperlipidemia     Hypertension     MI (myocardial infarction) ([address] 2019    has had 9 MIs. 2019 was the last    Neuromuscular disorder [facility]     due to CVA    Numbness and tingling in left arm     from fistula    Pneumonia     PONV (postoperative nausea and vomiting)     Prolonged emergence from general anesthesia     States requires more medication than most people    Sleep apnea     Uses CPAP    Stroke ([facility]     7mm thalamic cva 2017 deficts left side, left side weakness    TIA (transient ischemic attack)     Unspecified diseases of blood and blood-forming organs         Past Surgical History:   Procedure Laterality Date    AORTIC VALVE REPLACEMENT N/A 10/15/2019    TRANSCATHETER AORTIC VALVE REPLACEMENT FEMORAL APPROACH performed by Lulu Connolly MD at [address] [facility] Right 7/2/2019    PERITONEAL DIALYSIS CATHETER REMOVAL performed by Gonzalo Langston MD at [address] [facility]  2/29/2015    WN    CORONARY ANGIOPLASTY WITH STENT PLACEMENT  05/26/15    CYST REMOVAL  08/14/2013    EXCISION CYSTS, NECK X2 AND ABDOMINAL benign    DIAGNOSTIC CARDIAC CATH LAB PROCEDURE      DIALYSIS FISTULA CREATION Left 10/30/2017    LEFT BRACHIAL CEPHALIC FISTULA    DIALYSIS FISTULA CREATION Left 3/27/2019    LIGATION  AV FISTULA performed by Eliezer Jackson MD at [address], COLON, DIAGNOSTIC      IR TUNNELED CATHETER PLACEMENT GREATER THAN 5 YEARS  3/21/2022    IR TUNNELED CATHETER PLACEMENT GREATER THAN 5 YEARS 3/21/2022 [facility]    IR TUNNELED CATHETER PLACEMENT GREATER THAN 5 YEARS  4/21/2022    IR TUNNELED CATHETER PLACEMENT GREATER THAN 5 YEARS 4/21/2022 [facility]    IR TUNNELED CATHETER PLACEMENT GREATER THAN 5 YEARS  4/26/2022    IR TUNNELED CATHETER PLACEMENT GREATER THAN 5 YEARS 4/26/2022 [facility]    IR TUNNELED CATHETER PLACEMENT GREATER THAN 5 YEARS  6/23/2022    IR TUNNELED CATHETER PLACEMENT GREATER THAN 5 YEARS 6/23/2022 [facility]    OTHER SURGICAL HISTORY  02/01/2017    laparoscopic cholecystectomy with intraoperative cholangiogram    OTHER SURGICAL HISTORY  2018    PORT PLACEMENT  - vas cath    OTHER SURGICAL HISTORY Bilateral 06/26/2018    laprascopic peritoneal dialysis catheter placement    OTHER SURGICAL HISTORY Right 09/2018    peritoneal dialysis port placed on right side of abdomen    OTHER SURGICAL HISTORY  05/28/2019    PTA/Stenting R External Iliac artery    NC LAP INSERTION TUNNELED INTRAPERITONEAL CATHETER N/A 9/21/2018    LAPAROSCOPIC PERITONEAL DIALYSIS CATHETER REPLACEMENT performed by Liam Salazar MD at [address] [facility] [address] 2/24/2022    PERINEAL ABCESS DRAINAGE performed by Liam Salazar MD at [address]  01/06/2016    UPPER GASTROINTESTINAL ENDOSCOPY  01/29/2017    possible candida, otherwise normal appearing    VASCULAR SURGERY  aprx 2 years ago    2 stents placed, each side of groin       Allergies   Allergen Reactions    Morphine Nausea And Vomiting       Social History:  Reviewed. reports that he quit smoking about 2 years ago. His smoking use included cigarettes. He has a 16.50 pack-year smoking history. He has never used smokeless tobacco. He reports that he does not currently use alcohol. He reports that he does not use drugs. Family History:  Reviewed. family history includes Alcohol Abuse in his brother; Cancer in his sister and sister; Diabetes in his mother; Heart Disease in his father, sister, and sister; Kidney Disease in his sister; Obesity in his sister and sister. Review of System:  All other systems reviewed and are negative except for that noted above.  Pertinent negatives are:     General: negative for fever, chills   Ophthalmic ROS: negative for - eye pain or loss of vision  ENT ROS: negative for - headaches, sore throat   Respiratory: negative for - cough, sputum  Cardiovascular: Reviewed in HPI  Gastrointestinal: negative for - abdominal pain, diarrhea, N/V  Hematology: negative for - bleeding, blood clots, bruising or jaundice  Genito-Urinary:  negative for - Dysuria or incontinence  Musculoskeletal: negative for - Joint swelling, muscle pain  Neurological: negative for - confusion, dizziness, headaches   Psychiatric: No anxiety, no depression. Dermatological: negative for - rash    Physical Examination:  Vitals:    22 0914   BP: (!) 128/59   Pulse: 88   Resp: 16   Temp: 98.2 °F (36.8 °C)   SpO2: 95%      In: 10 [I.V.:10]  Out: 100    Wt Readings from Last 3 Encounters:   22 237 lb 9.6 oz (107.8 kg)   22 257 lb (116.6 kg)   22 257 lb 8 oz (116.8 kg)     Temp  Av.6 °F (36.4 °C)  Min: 96.7 °F (35.9 °C)  Max: 98.2 °F (36.8 °C)  Pulse  Av.6  Min: 88  Max: 111  BP  Min: 128/59  Max: 205/114  SpO2  Av.6 %  Min: 95 %  Max: 100 %  FiO2   Av.8 %  Min: 40 %  Max: 100 %    Intake/Output Summary (Last 24 hours) at 2022 1101  Last data filed at 2022 0943  Gross per 24 hour   Intake 10 ml   Output 100 ml   Net -90 ml       Telemetry: Sinus rhythm, PVC  Constitutional: Oriented. No distress. Head: Normocephalic and atraumatic. Mouth/Throat: Oropharynx is clear and moist.   Eyes: Conjunctivae normal. EOM are normal.   Neck: Neck supple. No rigidity. No JVD present. Cardiovascular: Normal rate, regular rhythm, S1&S2. Pulmonary/Chest: Bilateral respiratory sounds. No wheezes, No rhonchi. Decreased breath sounds on the left  Abdominal: Soft. Bowel sounds present. No distension, No tenderness. Musculoskeletal: No tenderness. No edema    Lymphadenopathy: Has no cervical adenopathy. Neurological: Alert and oriented. Cranial nerve appears intact, No Gross deficit   Skin: Skin is warm and dry. No rash noted. Psychiatric: Has a normal behavior     Labs, diagnostic and imaging results reviewed. Reviewed.    Recent Labs     22  1648 22  0448   * 135*   K 4.5 4.7   CL 91* 94*   CO2 25 21   BUN 55* 66*   CREATININE 6.6* 7.6* Recent Labs     09/23/22  1648 09/24/22  0448   WBC 9.5 8.3   HGB 12.3* 10.9*   HCT 37.8* 33.7*   MCV 88.0 88.6    257     Lab Results   Component Value Date/Time    CKTOTAL 45 05/25/2022 02:35 AM    TROPONINI 0.15 09/24/2022 04:48 AM     Lab Results   Component Value Date/Time    BNP 72 09/09/2013 02:14 AM     06/26/2013 12:32 PM    .0 05/16/2013 07:13 AM     Lab Results   Component Value Date/Time    PROTIME 16.7 07/17/2022 03:10 AM    PROTIME 14.9 06/22/2022 10:35 AM    PROTIME 15.1 05/29/2022 11:55 AM    INR 1.37 07/17/2022 03:10 AM    INR 1.19 06/22/2022 10:35 AM    INR 1.21 05/29/2022 11:55 AM     Lab Results   Component Value Date/Time    CHOL 147 07/17/2022 05:46 AM    HDL 52 07/17/2022 05:46 AM    TRIG 76 07/17/2022 05:46 AM       ECG: Sinus tachycardiaT wave abnormality,  Echo: 6/3/2022:    Definity® used for myocardial border enhancement. Left ventricular systolic function is severely reduced with a visually   estimated ejection fraction of 20-25 %. EF estimated by Jasmine's method at 24 %. The left ventricle is mildly dilated in size with normal wall thickness. Severe global hypokinesis with regional variation. Grade I diastolic dysfunction with normal LV pressure. There is no evidence of a left ventricular thrombus. Right ventricular systolic function is reduced. A 29 mm Langford Leyda S3 bioprosthetic aortic valve appears well seated   with normal Doppler values. Inadequate tricuspid valve regurgitation to estimate systolic pulmonary   artery pressure. There is a moderate left pleural effusion noted. Cath: 6/7/2022   LM Less than 10% [id number] stenosis            LAD Moderately calcified, 20 to 30% proximal-mid stenosis, distal vessel tapers at the apex with 60% diffuse disease. D1 has an ostial/proximal 75 to 80% stenosis. LCX  Calcified, proximal-mid 75 to 80% stenosis.   Distal vessel is tortuous with 70 to 85% diffuse stenosis with more discrete stenosis noted distally. OM 1 is a very small vessel with ostial/proximal 80% stenosis and 60 to 70% diffuse disease in the kuajxlcr-naf-gkoubf vessel. Charu Johnson RCA Dominant, calcified, tortuous, there is a proximal-mid stent with 60 to 70% in-stent restenosis. Distal vessel 20 to 30% stenosis   Mild to moderately increased filling pressures  Patent RCA stent with moderate to borderline severe in-stent restenosis  Severe circumflex and D1 stenosis  Continue medical management, consider outpatient high risk PCI of above territories pending outpatient clinical follow-up. Currently medical management seems best given comorbidities and need for additional fluid removal.    Cath:   Chest x-ray  mpression   Moderate left pleural effusion with associated left lower lobe airspace   opacities.      Scheduled Meds:   apixaban  5 mg Oral BID    clopidogrel  75 mg Oral Daily    DULoxetine  30 mg Oral Daily    linaclotide  145 mcg Oral QAM AC    isosorbide dinitrate  20 mg Oral TID    pantoprazole  40 mg Oral QAM AC    pravastatin  40 mg Oral Daily    tiotropium-olodaterol  2 puff Inhalation Daily    QUEtiapine  50 mg Oral Nightly    metoprolol succinate  100 mg Oral BID    docusate sodium  100 mg Oral Daily    sodium chloride flush  10 mL IntraVENous 2 times per day    bumetanide  1 mg IntraVENous Once    bumetanide  2 mg IntraVENous Q12H    insulin lispro  0-4 Units SubCUTAneous Q4H     Continuous Infusions:   sodium chloride      dextrose       PRN Meds:.albuterol sulfate HFA, cyclobenzaprine, ipratropium-albuterol, sodium chloride flush, sodium chloride, promethazine, acetaminophen **OR** acetaminophen, oxyCODONE, oxyCODONE, glucose, dextrose bolus **OR** dextrose bolus, glucagon (rDNA), dextrose     Patient Active Problem List    Diagnosis Date Noted    Hypotension due to drugs 11/25/2017    Acute on chronic combined systolic and diastolic heart failure (HCC)     Ischemic cardiomyopathy Dyslipidemia     Type 2 diabetes, uncontrolled, with neuropathy ([address] 03/04/2014    Bicuspid aortic valve 06/03/2013    CHF (congestive heart failure) ([address] 05/14/2013    Coronary artery disease of native artery of native heart with stable angina pectoris ([address] 05/14/2013    PVD (peripheral vascular disease) ([address] 05/14/2013    Acute respiratory failure with hypoxia and hypercapnia ([address] 09/23/2022    Hypertensive emergency 07/17/2022    SOB (shortness of breath) 07/17/2022    Altered mental status     Hypoglycemia 05/29/2022    Morbid obesity with BMI of 40.0-44.9, adult ([address] 04/26/2022    Former smoker 04/19/2022    Cardiomyopathy ([address] 04/19/2022    History of transcatheter aortic valve replacement (TAVR) 10/19/2019    HTN (hypertension), benign 11/14/2013    Asthma-COPD overlap syndrome ([address] 05/14/2013    Respiratory failure ([address] 04/18/2022    Pulmonary edema with diastolic CHF, NYHA class 3 ([address] 03/17/2022    [place]-rectal abscess     Somnolence     Atrial flutter ([address]     SIRS (systemic inflammatory response syndrome) ([address] 02/21/2022    NSTEMI (non-ST elevated myocardial infarction) ([address] 01/12/2022    Acute respiratory failure with hypercapnia ([address] 11/30/2021    Acute pulmonary edema ([address] 11/30/2021    Grade II diastolic dysfunction 89/74/1496    Shock circulatory ([address] 11/30/2021    Smoker 11/30/2021    Normocytic normochromic anemia 11/30/2021    Respiratory failure with hypoxia ([address] 11/29/2021    Speech problem     Urinary tract infection with hematuria     Acute CVA (cerebrovascular accident) ([address] 09/07/2021    Acute hypoxemic respiratory failure ([address] 08/12/2021    Type 2 diabetes mellitus with hyperglycemia ([address] 06/27/2021    Acute encephalopathy 06/27/2021    Cellulitis and abscess of hand 04/24/2021    Iliac artery occlusion, right (HCC)     Cellulitis of right foot 01/29/2021    Peripheral vascular occlusive disease ([facility] 01/02/2021    Ataxia     Weakness of both lower extremities 12/30/2020 Sinus bradycardia 12/30/2020    Sinus pause     GBS (Guillain[place] syndrome) ([facility])     Bilateral leg weakness 12/04/2020    Bradycardia 10/19/2019    Syncope and collapse 10/19/2019    PAF (paroxysmal atrial fibrillation) ([address]     Hypercholesteremia 07/30/2019    Chronic bronchitis [facility] 05/21/2019    Nasal congestion 05/21/2019    Chronic, continuous use of opioids 04/15/2019    Steal syndrome of dialysis vascular access ([facility])     SOB (shortness of breath) on exertion 12/24/2018    Anemia of chronic disease 11/12/2018    Pulmonary edema 11/09/2018    Fluid overload 11/09/2018    Renovascular hypertension     Mixed hyperlipidemia     Cigarette nicotine dependence in remission     Neuromuscular disorder ([address]     Acute diastolic CHF (congestive heart failure) ([address] 03/18/2018    Hemodialysis-associated hypotension 11/22/2017    ESRD (end stage renal disease) on dialysis ([address] 11/22/2017    Mucus plugging of bronchi     Nonrheumatic aortic valve stenosis     Pleural effusion     Chronic anemia     History of CVA (cerebrovascular accident)     Type 2 diabetes mellitus without complication, without long-term current use of insulin ([facility])     ZAINAB (obstructive sleep apnea)     Tobacco abuse 05/21/2017    CVA (cerebral vascular accident) ([address] 05/21/2017    Angina, class IV ([address]     Dyspnea     Gastroparesis due to DM ([address]     Biliary colic 29/80/9876    Symptomatic cholelithiasis 01/04/2017    Degeneration of lumbar or lumbosacral intervertebral disc 03/18/2016    Lumbar radiculopathy 03/18/2016    Lumbosacral spondylosis without myelopathy 03/18/2016    ZAINAB on CPAP 02/11/2016    Obesity (BMI 30-39. 9)     PNA (pneumonia) 03/28/2015    Depression with anxiety 06/05/2014    Passive smoke exposure 05/30/2014    Diabetic neuropathy ([address] 11/14/2013    Claudication in peripheral vascular disease ([address] 06/26/2013    Bilateral hilar adenopathy syndrome 06/03/2013    Sepsis without acute organ dysfunction ([address] 12/25/2012 Active Hospital Problems    Diagnosis Date Noted    Acute respiratory failure with hypoxia and hypercapnia (HCC) [J96.01, J96.02] 09/23/2022     Priority: Medium       Assessment:       Plan:    -Acute on chronic HFrEF with acute hypoxic respiratory failure  This is likely due to acute heart failure due to fluid overload. P.o. diuretic and IV diuretic were not effective. Patient needs to have dialysis to remove the excessive fluid. Given that he could not tolerate dialysis, a different protocol may be helpful as per nephrology evaluation. Patient does have significant CAD. His EF is 25%. Continue metoprolol. He also has a moderately large left pleural effusion that seems to be worse than before. Pleurocentesis may be helpful if dialysis cannot be done to remove the fluid.      -Left-sided pleural effusion    This seems to be somewhat increased. If fluid removal with dialysis is not possible, thoracentesis may be helpful in alleviating his symptoms. -HFrEF    Continue metoprolol. Needs to be evaluated by heart failure team.  Adding an ARB may be helpful as long as the electrolytes are closely monitored to avoid hyperkalemia. -ESRD    Hemodialysis as per nephrology.    -Hypertension    Continue current management. -CAD    He had in-stent restenosis on the last cath. Interventional cardiology needs to see him to decide whether it is feasible to proceed with PCI. -   Will hold Eliquis in case PCI is considered. -PAF  Continue Eliquis. Thank you for allowing me to participate in the care of Kitty Bellamy     NOTE: This report was transcribed using voice recognition software. Every effort was made to ensure accuracy, however, inadvertent computerized transcription errors may be present.

## 2022-09-24 NOTE — PROGRESS NOTES
09/23/22 2214   NIV Type   Skin Assessment Clean, dry, & intact   Skin Protection for O2 Device No  (patient refusing mepilex)   NIV Started/Stopped On   Equipment Type v60   Mode Bilevel   Mask Type Full face mask   Mask Size Large   Settings/Measurements   PIP Observed 14 cm H20   IPAP 18 cmH20   CPAP/EPAP 6 cmH2O   Vt (Measured) 581 mL   Rate Ordered 14   Resp (!) 32   FiO2  50 %   I Time/ I Time % 1 s   Minute Volume (L/min) 18.4 Liters   Mask Leak (lpm) 47 lpm   Comfort Level Good   Using Accessory Muscles No   SpO2 97   Breath Sounds   Right Upper Lobe Diminished   Right Middle Lobe Diminished   Right Lower Lobe Diminished   Left Upper Lobe Diminished   Left Lower Lobe Diminished   Alarm Settings   Alarms On Y   Low Pressure (cmH2O) 6 cmH2O   High Pressure  30 cmH2O   Apnea (secs) 20 secs   RR Low (bpm) 6   RR High (bpm) 40 br/min     Patient transferred to C4 and was replaced back on BIPAP at this time.

## 2022-09-24 NOTE — H&P
Hospital Medicine History & Physical      PCP: Rogelio Flores MD    Date of Admission: 9/23/2022    Date of Service: Pt seen/examined on 9/23/2022    Pt seen/examined face to face on and admitted as inpatient with expected LOS greater than two midnights due to medical therapy      Chief Complaint:    Chief Complaint   Patient presents with    Respiratory Distress     Squad reporting pt called c/o SOB x 3 days. Only received 45min of dialysis today due to feeling SOB. Squad reports pt was tripoding upon their arrival with a RA sat of 96%. They report patient was not moving any air. 1 duoneb and 1 albuterol given in route to the ED along with BIPAP. History Of Present Illness:      45 y.o. male who presented to [facility] with past medical history of arthritis, asthma, CAD, GERD, history of blood clots, hyperlipidemia, end-stage renal disease on hemodialysis, HFrEF EF 25%, severe aortic stenosis status post TAVR, COPD, depression, presented to the ED with chief complaint of respiratory distress. Patient reported that he he has been compliant with salt diet, does not drink fluids more than 2 L/day, patient also has been going to all his dialysis session 3 times a day in addition to be compliant with CPAP. Patient reported that he went to dialysis today and noticed that he has not been able to complete more than 45 minutes due to worsening severe shortness of breath thus was sent here to the ED. Patient reports otherwise no known alleviating or exacerbating factor reports that he is doing everything he can and it still unclear why he continues to be short of breath.   Patient does report association with cough and phlegm production has been going on for few days but no fever      Past Medical History:          Diagnosis Date    Ambulatory dysfunction     walker for long distances, SOB with distance    Aortic stenosis     echo 2017    Arthritis     hands and hips    Asthma     Bilateral hilar adenopathy syndrome 6/3/2013    CAD (coronary artery disease)     Dr. Graylon Brunner [facility]) 04/19/2019    EF= 43%    CHF (congestive heart failure) ([address]     Chronic pain     COPD (chronic obstructive pulmonary disease) ([address]     pulmonology Dr. Celeste Less    Depression     Diabetes mellitus ([address]     borderline    Difficult intravenous access     Emphysema of lung ([address]     ESRD (end stage renal disease) on dialysis ([address]     MWF    Fear of needles     Gastric ulcer     GERD (gastroesophageal reflux disease)     Heart valve problem     bicuspic valve    Hemodialysis patient ([address]     History of spinal fracture     work incident    Hx of blood clots     Bilateral lower extremities; stents in place    Hyperlipidemia     Hypertension     MI (myocardial infarction) ([address] 2019    has had 9 MIs. 2019 was the last    Neuromuscular disorder ([address]     due to CVA    Numbness and tingling in left arm     from fistula    Pneumonia     PONV (postoperative nausea and vomiting)     Prolonged emergence from general anesthesia     States requires more medication than most people    Sleep apnea     Uses CPAP    Stroke ([address]     7mm thalamic cva 2017 deficts left side, left side weakness    TIA (transient ischemic attack)     Unspecified diseases of blood and blood-forming organs        Past Surgical History:          Procedure Laterality Date    AORTIC VALVE REPLACEMENT N/A 10/15/2019    TRANSCATHETER AORTIC VALVE REPLACEMENT FEMORAL APPROACH performed by Marlene Portillo MD at [address] [facility] Right 7/2/2019    PERITONEAL DIALYSIS CATHETER REMOVAL performed by Sahara Ruth MD at [address] [place] [address] Road  2/29/2015    [facility]    CORONARY ANGIOPLASTY WITH STENT PLACEMENT  05/26/15    CYST REMOVAL  08/14/2013    EXCISION CYSTS, NECK X2 AND ABDOMINAL benign    DIAGNOSTIC CARDIAC CATH LAB PROCEDURE      DIALYSIS FISTULA CREATION Left 10/30/2017    LEFT BRACHIAL CEPHALIC FISTULA DIALYSIS FISTULA CREATION Left 3/27/2019    LIGATION  AV FISTULA performed by Jazmin Bellamy MD at [address] [facility] [address], COLON, DIAGNOSTIC      IR TUNNELED [address] 5 YEARS  3/21/2022    IR TUNNELED CATHETER PLACEMENT GREATER THAN 5 YEARS 3/21/2022 [place] [facility]    IR TUNNELED CATHETER PLACEMENT GREATER THAN 5 YEARS  4/21/2022    IR TUNNELED CATHETER PLACEMENT GREATER THAN 5 YEARS 4/21/2022 [facility]    IR TUNNELED CATHETER PLACEMENT GREATER THAN 5 YEARS  4/26/2022    IR TUNNELED CATHETER PLACEMENT GREATER THAN 5 YEARS 4/26/2022 [facility]    IR TUNNELED CATHETER PLACEMENT GREATER THAN 5 YEARS  6/23/2022    IR TUNNELED CATHETER PLACEMENT GREATER THAN 5 YEARS 6/23/2022 [facility] SPECIAL PROCEDURES    OTHER SURGICAL HISTORY  02/01/2017    laparoscopic cholecystectomy with intraoperative cholangiogram    OTHER SURGICAL HISTORY  2018    PORT PLACEMENT  - vas cath    OTHER SURGICAL HISTORY Bilateral 06/26/2018    laprascopic peritoneal dialysis catheter placement    OTHER SURGICAL HISTORY Right 09/2018    peritoneal dialysis port placed on right side of abdomen    OTHER SURGICAL HISTORY  05/28/2019    PTA/Stenting R External Iliac artery    [place] LAP INSERTION TUNNELED INTRAPERITONEAL CATHETER N/A 9/21/2018    LAPAROSCOPIC PERITONEAL DIALYSIS CATHETER REPLACEMENT performed by Liam Salazar MD at [address] [facility] 2/24/2022    PERINEAL ABCESS DRAINAGE performed by Liam Salazar MD at [address] [facility]  01/06/2016    UPPER GASTROINTESTINAL ENDOSCOPY  01/29/2017    possible candida, otherwise normal appearing    VASCULAR SURGERY  aprx 2 years ago    2 stents placed, each side of groin       Medications Prior to Admission:      Prior to Admission medications    Medication Sig Start Date End Date Taking?  Authorizing Provider   metoprolol succinate (TOPROL XL) 100 MG extended release tablet Take 1 tablet by mouth in the morning and at bedtime 7/21/22   Shree Beyer MD   sacubitril-valsartan (ENTRESTO) 24-26 MG per tablet Take 2 tablets by mouth in the morning and 2 tablets before bedtime.  7/21/22   Shree Beyer MD   cyclobenzaprine (FLEXERIL) 5 MG tablet Muscle spasms 7/21/22   Shree Beyer MD   traZODone (DESYREL) 100 MG tablet Take 100 mg by mouth nightly    Historical Provider, MD   QUEtiapine (SEROQUEL) 50 MG tablet TAKE 1 TABLET BY MOUTH EVERY EVENING 6/30/22   [facility], MD   isosorbide dinitrate (ISORDIL) 20 MG tablet Take 1 tablet by mouth 3 times daily 6/8/22   JASE Hunter   clopidogrel (PLAVIX) 75 MG tablet Take 1 tablet by mouth daily 5/9/22   JAY Young CNP   pantoprazole (PROTONIX) 40 MG tablet TAKE 1 TABLET BY MOUTH EVERY MORNING BEFORE BREAKFAST 4/28/22   [facility], MD   docusate sodium (DOK) 100 MG capsule Take 1 capsule by mouth daily 4/27/22   Jasson Sharp MD   LINZESS 145 MCG capsule TAKE 1 CAPSULE BY MOUTH IN THE MORNING BEFORE BREAKFAST 4/27/22   [facility], MD   DULoxetine (CYMBALTA) 30 MG extended release capsule TAKE 1 CAPSULE BY MOUTH EVERY DAY 3/29/22   [facility], MD   mineral oil liquid Take 30 mLs by mouth daily as needed for Constipation 3/9/22   [facility], MD   sennosides-docusate sodium (SENOKOT-S) 8.6-50 MG tablet Take 1 tablet by mouth daily 3/9/22   [facility], MD   apixaban (ELIQUIS) 5 MG TABS tablet Take 1 tablet by mouth 2 times daily 3/3/22   Aura Noguera MD   pravastatin (PRAVACHOL) 40 MG tablet Take 1 tablet by mouth daily 2/10/22   JAY Young CNP   Continuous Blood Gluc Sensor (DEXCOM G6 SENSOR) MISC Every 10 days 10/5/21   [facility], MD   Continuous Blood Gluc Transmit (DEXCOM G6 TRANSMITTER) MISC 1 each by Does not apply route every 3 months 10/5/21   Jerrod Aragon MD   Continuous Blood Gluc  ([address]) [address] 1 each by Does not apply route Daily with lunch 10/5/21   Tommy Mccurdy MD   B Complex-C-Folic Acid (VIRT-CAPS) 1 MG CAPS TAKE 1 CAPSULE BY MOUTH EVERY DAY 9/20/21   Tommy Mccurdy MD   Calcium Acetate, Phos Binder, 667 MG CAPS TAKE 1 CAPSULE BY MOUTH THREE TIMES DAILY WITH MEALS 8/12/21   Tommy Mccurdy MD   nitroGLYCERIN (NITROSTAT) 0.4 MG SL tablet DISSOLVE 1 TABLET UNDER THE TONGUE AS NEEDED FOR CHEST PAIN EVERY 5 MINUTES UP TO 3 TIMES. IF NO RELIEF CALL 911. 1/7/21   Tommy Mccurdy MD   vitamin D (ERGOCALCIFEROL) 33671 units CAPS capsule TK 1 C PO WEEKLY 6/2/19   Historical Provider, MD   Tiotropium Bromide-Olodaterol (STIOLTO RESPIMAT) 2.5-2.5 MCG/ACT AERS Inhale 2 puffs into the lungs daily 5/21/19   Cynthia Vargas MD   Blood Glucose Monitoring Suppl ADAM USE AS DIRECTED. 4/25/18   Royce Baugh MD   Alcohol Swabs PADS USE AS DIRECTED 4/25/18   Royce Baugh MD   albuterol sulfate  (90 Base) MCG/ACT inhaler Inhale 2 puffs into the lungs every 6 hours as needed for Wheezing 11/8/17   Rasta Altamirano MD   ipratropium-albuterol (DUONEB) 0.5-2.5 (3) MG/3ML SOLN nebulizer solution Inhale 3 mLs into the lungs every 6 hours as needed for Shortness of Breath 10/15/17   Mellissa Dhaliwal MD   calcium carbonate (TUMS) 500 MG chewable tablet Take 1 tablet by mouth 3 times daily as needed for Heartburn. Historical Provider, MD       Allergies:  Morphine    Social History:          TOBACCO:   reports that he quit smoking about 2 years ago. His smoking use included cigarettes. He has a 16.50 pack-year smoking history. He has never used smokeless tobacco.  ETOH:   reports that he does not currently use alcohol.   E-cigarette/Vaping       Questions Responses    E-cigarette/Vaping Use Never User    Start Date     Passive Exposure     Quit Date     Counseling Given     Comments               Family History:      Family History reviewed with patient, and does not pertain and non-contributory to the current illness        Problem Relation Age of Onset    Diabetes Mother     Heart Disease Father     Kidney Disease Sister         stage 4-kidney failure    Cancer Sister     Heart Disease Sister     Obesity Sister     Cancer Sister     Heart Disease Sister     Obesity Sister     Alcohol Abuse Brother        REVIEW OF SYSTEMS:     Constitutional:  No Fever, No Chills, No Night Sweats  ENT/Mouth:  No Nasal Congestion,  No Hoarseness, No new mouth lesion  Eyes:  No Eye Pain, No Redness, No Discharge  Cardiovascular:  No Chest Pain, No Orthopnea, No Palpitations  Respiratory: + Cough, No Sputum, + dyspnea  Gastrointestinal: No Vomiting, No Diarrhea, No abdominal pain  Genitourinary: No Urinary Frequency, No Hematuria, No Urinary pain  Musculoskeletal:  No worsening Arthralgias, No worsening Myalgias  Skin:  No new Skin Lesions, No new skin rash  Neuro:  No new weakness, No new numbness. Psych:  No suicial ideation, No Violence ideation    PHYSICAL EXAM PERFORMED:    BP (!) 205/114   Pulse 100   Temp 97.6 °F (36.4 °C) (Oral)   Resp 25   Ht 5' 9\" (1.753 m)   Wt 257 lb (116.6 kg)   SpO2 100%   BMI 37.95 kg/m²     General appearance:  mild acute distress, appears older than stated age  HEENT:   atraumatic, sclera anicteric, Conjunctivae clear. Neck: Supple,Trachea midline, no goiter  Respiratory:minimal accessory muscle usage, Normal respiratory effort. Basilar crackles on BiPAP  Cardiovascular:  Regular rate and rhythm, capillary refill 2 seconds  Abdomen: Soft, non-tender, non-distended with normal bowel sounds. Musculoskeletal:  No clubbing, cyanosis. trace edema LE bilaterally. Skin: turgor normal.  No new rashes or lesions. Neurologic: Alert and oriented x4, no new focal sensory/motor deficits.      Labs:     Recent Labs     09/23/22  1648   WBC 9.5   HGB 12.3*   HCT 37.8*        Recent Labs     09/23/22  1648   *   K 4.5   CL 91*   CO2 25   BUN 55*   CREATININE 6.6*   CALCIUM 9.0     Recent Labs     09/23/22  1648   AST 19   ALT 11 BILITOT 0.4   ALKPHOS 100     No results for input(s): INR in the last 72 hours. Recent Labs     09/23/22  1648   TROPONINI 0.14*       Urinalysis:      Lab Results   Component Value Date/Time    NITRU Negative 05/29/2022 12:51 PM    WBCUA 10-20 05/29/2022 12:51 PM    BACTERIA 3+ 05/29/2022 12:51 PM    RBCUA 5-10 05/29/2022 12:51 PM    BLOODU TRACE-INTACT 05/29/2022 12:51 PM    SPECGRAV 1.015 05/29/2022 12:51 PM    GLUCOSEU 100 05/29/2022 12:51 PM       Radiology:     CXR: I have reviewed the CXR with the following interpretation:   Pleural effusion  EKG:  I have reviewed the EKG with the following interpretation:   Sinus tachycardia   XR CHEST PORTABLE   Final Result   Moderate left pleural effusion with associated left lower lobe airspace   opacities. ASSESSMENT AND PLAN:    Active Hospital Problems    Diagnosis Date Noted    Acute respiratory failure with hypoxia and hypercapnia (HCC) [J96.01, J96.02] 09/23/2022     Priority: Medium     Acute hypoxic respiratory failure,  Secondary to fluid overload  Patient unable to complete dialysis for more than 45 minutes today in addition has noted to have worsening left pleural effusion  Currently on BiPAP, responding well    Acute on chronic HFrEF EF 25%, NYHA III:  Strict I's and O's, Daily weights  Lasix 60 IV did not do any output, thus ordered additional dose of Bumex. Bumex to be continued for tomorrow  Continued home medications  Heart failure protocol set ordered  Continue to check response   Cardiology on board, Much appreciated    End-stage renal disease:  Nephrology on board, Much appreciated    Cardiorenal syndrome:  Nephrology and cardiology on board    Acidosis: Secondary to end-stage renal disease    ZAINAB: On BiPAP currently  Type 2 Diabetes: Insulin sliding scale  Essential Hypertension: Continue home medication  Hyperlipidemia: Controlled on home Statin.  Outpatient PCP follow up post-discharge  Paroxysmal Atrial fibrilliation: unspecified and clinically unable to determine etiology. Controlled on home medication. currently Anticoagulated and Monitored on tele  Depression: continue home medications  Anxiety: Controlled, continue home medications  CAD: Currently controlled on home meds. No current symptoms or evidence of active signs of ischemia.      Diet: NPO except meds ordered    DVT Prophylaxis: On Eliquis    Dispo:   Expected LOS greater than two [facility]

## 2022-09-24 NOTE — CONSULTS
[address]                                  CONSULTATION    PATIENT NAME: Fredis Matson                  :        1970  MED REC NO:   [id number]                          ROOM:       [id number]  ACCOUNT NO:   [de-identified]                           ADMIT DATE: 2022  PROVIDER:     Candy Muhammad MD    CONSULT DATE:  2022    REASON FOR CONSULTATION:  End-stage renal disease management. HISTORY OF PRESENT ILLNESS:  The patient is a 28-year-old  male  patient with a past medical history significant for end-stage renal  disease on hemodialysis every Monday, Wednesday and Friday, who  presented to [facility] complaining of worsening shortness  of breath. The patient missed his hemodialysis on Wednesday due  to nausea and vomiting. Upon presentation to his outpatient dialysis  unit he was noted to have significant shortness of breath and  tachycardia, which prompted his presentation to the hospital.  The  patient was admitted for further evaluation and Nephrology was consulted  for further management of his dialysis needs. PAST MEDICAL HISTORY:  1. End-stage renal disease. 2.  Aortic stenosis. 3.  Coronary artery disease. 4.  Systolic heart failure. 5.  COPD. 6.  Diabetes mellitus. 7.  Hypertension. 8.  Hyperlipidemia. 9.  Transient ischemic attack. PAST SURGICAL HISTORY:  1. Aortic valve replacement. 2.  Colonoscopy. 3.  Coronary artery stent placement. 4.  AV fistula creation. 5.  Tunneled dialysis catheter placement and removal.  6.  Tonsillectomy. ALLERGIES:  The patient is allergic to morphine. SOCIAL HISTORY:  The patient quit smoking few years ago and does not  drink alcohol. FAMILY HISTORY:  Significant for chronic kidney disease in his sister,  coronary artery disease in his father and diabetes mellitus in his  mother.     REVIEW OF SYSTEM:  The patient denied any cough, chills or  expectorations. Otherwise, a 10-point review of systems was relatively  unremarkable. PHYSICAL EXAMINATION:  VITAL SIGNS:  Blood pressure 176/104, heart rate 96, respirations 16,  temperature 97.8 Fahrenheit. The patient is satting 98% on 3 liters  nasal cannula. GENERAL APPEARANCE:  The patient is alert and oriented x3, not in acute  distress. HEENT:  Eyes revealed normal conjunctivae, reactive pupils. NECK:  Revealed midline trachea, nonpalpable thyroid. LUNGS:  Clear to anterior auscultation bilaterally, nonlabored  breathing. CARDIOVASCULAR EXAM:  Revealed S1 and S2, regular rate and rhythm. No  added murmurs or rubs. No peripheral edema. ABDOMEN:  Exam revealed an obese abdomen, soft. No organomegaly. SKIN:  Revealed no lesions or rashes. Warm to touch. PSYCHIATRIC:  Revealed good judgment and insight. LYMPHATICS:  Revealed no cervical or axillary adenopathies. ASSESSMENT AND PLAN:  1. End-stage renal disease. We will arrange for hemodialysis today,  then again on Monday per Monday, Wednesday, Friday schedule. 2.  No significant electrolyte disorders noted. 3.  Hypertension, blood pressure elevated, we will reassess after  ultrafiltration. 4.  Anemia, stable hemoglobin, monitor.         Miranda Light MD    D: 09/24/2022 14:03:22       T: 09/24/2022 14:07:01     [email or web address]  Job#: [id number]     Doc#: [id number]    CC:

## 2022-09-24 NOTE — PROGRESS NOTES
09/23/22 2100   NIV Type   Equipment Type V60   Mode Bilevel   Mask Type Full face mask   Mask Size Large   Settings/Measurements   PIP Observed 20 cm H20   IPAP 18 cmH20   CPAP/EPAP 6 cmH2O   Vt (Measured) 690 mL   Resp 20   FiO2  50 %   Minute Volume (L/min) 18 Liters   Mask Leak (lpm) 36 lpm   Comfort Level Good   Using Accessory Muscles No   SpO2 100

## 2022-09-24 NOTE — PROGRESS NOTES
Hospitalist Progress Note    Date of Admission: 9/23/2022    Chief Complaint: SOB    Hospital Course: 45 y.o. male who presented to [facility] with past medical history of arthritis, asthma, CAD, GERD, history of blood clots, hyperlipidemia, end-stage renal disease on hemodialysis, HFrEF EF 25%, severe aortic stenosis status post TAVR, COPD, depression, presented to the ED with chief complaint of respiratory distress. Patient reported that he he has been compliant with salt diet, does not drink fluids more than 2 L/day, patient also has been going to all his dialysis session 3 times a day in addition to be compliant with CPAP. Patient reported that he went to dialysis today and noticed that he has not been able to complete more than 45 minutes due to worsening severe shortness of breath thus was sent here to the ED. Patient reports otherwise no known alleviating or exacerbating factor reports that he is doing everything he can and it still unclear why he continues to be short of breath. Patient does report association with cough and phlegm production has been going on for few days but no fever    Subjective: SOB improving some. Plan for HD today. Labs:   Recent Labs     09/23/22 1648 09/24/22  0448   WBC 9.5 8.3   HGB 12.3* 10.9*   HCT 37.8* 33.7*    257     Recent Labs     09/23/22 1648 09/24/22  0448   * 135*   K 4.5 4.7   CL 91* 94*   CO2 25 21   BUN 55* 66*   CREATININE 6.6* 7.6*   CALCIUM 9.0 8.7     Recent Labs     09/23/22 1648 09/24/22  0448   AST 19 12*   ALT 11 8*   BILITOT 0.4 0.3   ALKPHOS 100 83     No results for input(s): INR in the last 72 hours. Physical Exam Performed:    BP (!) 176/104   Pulse 96   Temp 97.8 °F (36.6 °C) (Oral)   Resp 16   Ht 5' 9\" (1.753 m)   Wt 237 lb 9.6 oz (107.8 kg)   SpO2 98%   BMI 35.09 kg/m²     General appearance:  No apparent distress, appears stated age and cooperative. HEENT:  Pupils equal, round, and reactive to light. Conjunctivae/corneas clear. Neck:  Supple, no jugular venous distention. Trachea midline with full range of motion. Respiratory:  Normal respiratory effort. Significantly diminished on the left. Otherwise clear to auscultation, bilaterally without Rales/Wheezes/Rhonchi. Cardiovascular:  Regular rate and rhythm with normal S1/S2 without murmurs, rubs or gallops. Abdomen:  Soft, non-tender, non-distended with normal bowel sounds. Musculoskelatal:  No clubbing, cyanosis or edema bilaterally. Full range of motion without deformity. Neurologic:  Neurovascularly intact without any focal sensory/motor deficits. Cranial nerves: II-XII intact, grossly non-focal.  Psychiatric:  Alert and oriented, thought content appropriate, normal insight  Skin:  Skin color, texture, turgor normal.  No rashes or lesions. Capillary Refill:  Brisk, < 3 seconds. Peripheral Pulses:  +2 palpable, equal bilaterally.      Consults:    IP CONSULT TO HOSPITALIST  IP CONSULT TO HEART FAILURE NURSE/COORDINATOR  IP CONSULT TO DIETITIAN  IP CONSULT TO NEPHROLOGY  IP CONSULT TO CARDIOLOGY  IP CONSULT TO PALLIATIVE CARE    Assessment/Plan:    Active Hospital Problems    Diagnosis     Coronary artery disease involving native coronary artery of native heart without angina pectoris [I25.10]      Priority: High    HFrEF (heart failure with reduced ejection fraction) ([facility] [I50.20]      Priority: Medium    Acute respiratory failure with hypoxia and hypercapnia (HCC) [J96.01, J96.02]      Priority: Medium    Uncontrolled hypertension [I10]      Priority: Medium    Pleural effusion on left [J90]      Acute hypoxic respiratory failure,  Secondary to fluid overload  Patient unable to complete dialysis for more than 45 minutes in addition has noted to have worsening left pleural effusion  Currently on BiPAP, responding well    Acute on chronic HFrEF EF 25%, NYHA III:  Strict I's and O's, Daily weights  Lasix 60 IV did not do any output, thus ordered additional dose of Bumex. Bumex to be continued for tomorrow  Continued home medications  Heart failure protocol set ordered  Continue to check response   Cardiology consulted    End-stage renal disease:  Nephrology consulted. Continue HD    Cardiorenal syndrome:  Nephrology and cardiology on board    Acidosis: Secondary to end-stage renal disease    ZAINAB: On BiPAP currently  Type 2 Diabetes: Insulin sliding scale  Essential Hypertension: Continue home medication  Hyperlipidemia: Controlled on home Statin. Outpatient PCP follow up post-discharge  Paroxysmal Atrial fibrilliation: unspecified and clinically unable to determine etiology. Controlled on home medication. currently Anticoagulated and Monitored on tele  Depression: continue home medications  Anxiety: Controlled, continue home medications  CAD: Currently controlled on home meds. No current symptoms or evidence of active signs of ischemia. Diet: NPO except meds ordered    DVT Prophylaxis: On Eliquis  Diet: ADULT DIET;  Regular; 4 carb choices (60 gm/meal)  Diet NPO Exceptions are: Ice Chips, Sips of Water with Meds  Code Status: Full Code  PT/OT Eval Status: NA    Dispo - 2-3 days    Amanda Ha MD

## 2022-09-24 NOTE — PROGRESS NOTES
BP (!) 128/59   Pulse 88   Temp 98.2 °F (36.8 °C) (Oral)   Resp 16   Ht 5' 9\" (1.753 m)   Wt 237 lb 9.6 oz (107.8 kg)   SpO2 95%   BMI 35.09 kg/m²  on 3L O2 per nasal cannula. Pt A&O x3, re-oriented to time, pleasant. Pt with complaints of chronic hip and back pain \"9\", writer will administer prn roxicodone per pt request.  Pt with intermittent dyspnea at rest, dyspnea with exertion. Lungs diminished. NSR on tele. Pt NPO, will message MD regarding diet. A.m medications will be given with sips of water at this time. Bedside table, call light within reach. Pt instructed to call out for any needs and assistance. Will continue to monitor.   Kimi Rondon RN  9/24/2022

## 2022-09-24 NOTE — CONSULTS
RD received consult for CHF diet education. Pt in HD, will provide diet education handout at [facility]. CHF education provided multiple times at previous admissions. Will continue to monitor per nutrition standards of care.      Bailee Hankins MS, RD, LD on 9/24/2022 at 2:06 PM  Office: [phone]

## 2022-09-24 NOTE — ED PROVIDER NOTES
CHIEF COMPLAINT  Respiratory Distress (Squad reporting pt called c/o SOB x 3 days. Only received 45min of dialysis today due to feeling SOB. Squad reports pt was tripoding upon their arrival with a RA sat of 96%. They report patient was not moving any air. 1 duoneb and 1 albuterol given in route to the ED along with BIPAP. )      HISTORY OF PRESENT ILLNESS  Dafne Bravo is a 45 y.o. male with a history of end-stage renal disease on hemodialysis, COPD, asthma, CHF, CAD, HTN, PAF, DM, AVR, CVA who presents to the ED complaining of shortness of breath. Patient brought in by EMS from dialysis. They state he only received 45 minutes of treatment before complaining of severe dyspnea and was observed to be tachypneic. Brought in on CPAP and received a breathing treatment during transport. Patient states he has been dyspneic for the last 3 days. Reports a nonproductive cough which is new, generalized chest tightness, nausea and several episodes of emesis yesterday but none today. Denies fever but states he has had occasional chills. Unaware of sick contacts. Vaccinated for COVID-19. No other complaints, modifying factors or associated symptoms. I have reviewed the following from the nursing documentation.     Past Medical History:   Diagnosis Date    Ambulatory dysfunction     walker for long distances, SOB with distance    Aortic stenosis     echo 2017    Arthritis     hands and hips    Asthma     Bilateral hilar adenopathy syndrome 6/3/2013    CAD (coronary artery disease)     Dr. Willie Schofield [facility]) 04/19/2019    EF= 43%    CHF (congestive heart failure) ([address]     Chronic pain     COPD (chronic obstructive pulmonary disease) [facility])     pulmonology Dr. Nicholas Stone    Depression     Diabetes mellitus ([address]     borderline    Difficult intravenous access     Emphysema of lung ([address]     ESRD (end stage renal disease) on dialysis ([address]     MWF    Fear of needles     Gastric ulcer     GERD (gastroesophageal reflux disease)     Heart valve problem     bicuspic valve    [facility]od[facility] patient [facility]     History of spinal fracture     work incident    Hx of blood clots     Bilateral lower extremities; stents in place    Hyperlipidemia     Hypertension     MI (myocardial infarction) ([facility] 2019    has had 9 MIs. 2019 was the last    Neuromuscular disorder ([facility]     due to CVA    Numbness and tingling in left arm     from fistula    Pneumonia     PONV (postoperative nausea and vomiting)     Prolonged emergence from general anesthesia     States requires more medication than most people    Sleep apnea     Uses CPAP    Stroke ([facility]     7mm thalamic cva 2017 deficts left side, left side weakness    TIA (transient ischemic attack)     Unspecified diseases of blood and blood-forming organs      Past Surgical History:   Procedure Laterality Date    AORTIC VALVE REPLACEMENT N/A 10/15/2019    TRANSCATHETER AORTIC VALVE REPLACEMENT FEMORAL APPROACH performed by Janet Aguilar MD at [facility] Right 7/2/2019    PERITONEAL DIALYSIS CATHETER REMOVAL performed by Sayra Lux MD at [address]  2/29/2015    WNL    CORONARY ANGIOPLASTY WITH STENT PLACEMENT  05/26/15    CYST REMOVAL  08/14/2013    EXCISION CYSTS, NECK X2 AND ABDOMINAL benign    DIAGNOSTIC CARDIAC CATH LAB PROCEDURE      DIALYSIS FISTULA CREATION Left 10/30/2017    LEFT BRACHIAL CEPHALIC FISTULA    DIALYSIS FISTULA CREATION Left 3/27/2019    LIGATION  AV FISTULA performed by Nunu Avila MD at [address] [facility], DIAGNOSTIC      IR TUNNELED CATHETER PLACEMENT GREATER THAN 5 YEARS  3/21/2022    IR TUNNELED CATHETER PLACEMENT GREATER THAN 5 YEARS 3/21/2022 [facility]    IR TUNNELED CATHETER PLACEMENT GREATER THAN 5 YEARS  4/21/2022    IR TUNNELED CATHETER PLACEMENT GREATER THAN 5 YEARS 4/21/2022 [facility]    IR TUNNELED CATHETER PLACEMENT GREATER THAN 5 YEARS 4/26/2022    IR TUNNELED CATHETER PLACEMENT GREATER THAN 5 YEARS 4/26/2022 [facility]    IR TUNNELED CATHETER PLACEMENT GREATER THAN 5 YEARS  6/23/2022    IR TUNNELED CATHETER PLACEMENT GREATER THAN 5 YEARS 6/23/2022 [facility]    OTHER SURGICAL HISTORY  02/01/2017    laparoscopic cholecystectomy with intraoperative cholangiogram    OTHER SURGICAL HISTORY  2018    PORT PLACEMENT  - vas cath    OTHER SURGICAL HISTORY Bilateral 06/26/2018    laprascopic peritoneal dialysis catheter placement    OTHER SURGICAL HISTORY Right 09/2018    peritoneal dialysis port placed on right side of abdomen    OTHER SURGICAL HISTORY  05/28/2019    PTA/Stenting R External Iliac artery    [place] LAP INSERTION TUNNELED INTRAPERITONEAL CATHETER N/A 9/21/2018    LAPAROSCOPIC PERITONEAL DIALYSIS CATHETER REPLACEMENT performed by Adam Whitlock MD at [address] [place] [address] N/A 2/24/2022    PERINEAL ABCESS DRAINAGE performed by Adam Whitlock MD at [address]  01/06/2016    UPPER GASTROINTESTINAL ENDOSCOPY  01/29/2017    possible candida, otherwise normal appearing    VASCULAR SURGERY  aprx 2 years ago    2 stents placed, each side of groin     Family History   Problem Relation Age of Onset    Diabetes Mother     Heart Disease Father     Kidney Disease Sister         stage 4-kidney failure    Cancer Sister     Heart Disease Sister     Obesity Sister     Cancer Sister     Heart Disease Sister     Obesity Sister     Alcohol Abuse Brother      Social History     Socioeconomic History    Marital status:      Spouse name: Not on file    Number of children: Not on file    Years of education: Not on file    Highest education level: Not on file   Occupational History    Not on file   Tobacco Use    Smoking status: Former     Packs/day: 0.50     Years: 33.00     Pack years: 16.50     Types: Cigarettes     Quit date: 4/26/2020     Years since quittin.4    Smokeless tobacco: Never    Tobacco comments:     [facility] in [place] quit 2021   Vaping Use    Vaping Use: Never used   Substance and Sexual Activity    Alcohol use: Not Currently     Alcohol/week: 0.0 standard drinks     Comment: occ    Drug use: No    Sexual activity: Yes     Partners: Female     Comment:    Other Topics Concern    Not on file   Social History Narrative    Not on file     Social Determinants of Health     Financial Resource Strain: Not on file   Food Insecurity: Not on file   Transportation Needs: Not on file   Physical Activity: Not on file   Stress: Not on file   Social Connections: Not on file   Intimate Partner Violence: Not on file   Housing Stability: Not on file     Current Facility-Administered Medications   Medication Dose Route Frequency Provider Last Rate Last Admin    nitroglycerin (NITRO-BID) 2 % ointment 0.5 inch  0.5 inch Topical Once Claudean Bruns, MD        sodium chloride flush 0.9 % injection 10 mL  10 mL IntraVENous 2 times per day Fatmata Richardsick A Yimi, DO        sodium chloride flush 0.9 % injection 10 mL  10 mL IntraVENous PRN Radhamad A Yimi, DO        0.9 % sodium chloride infusion   IntraVENous PRN Radhamad A Yimi, DO        potassium chloride 10 mEq/100 mL IVPB (Peripheral Line)  10 mEq IntraVENous PRN Ahmad A Yimi, DO        magnesium sulfate 2000 mg in 50 mL IVPB premix  2,000 mg IntraVENous PRN Fatmata Rm A Yimi, DO        promethazine (PHENERGAN) tablet 12.5 mg  12.5 mg Oral Q6H PRN Ahmad A Yimi, DO        acetaminophen (TYLENOL) tablet 650 mg  650 mg Oral Q6H PRN Ahmad A Yimi, DO        Or    acetaminophen (TYLENOL) suppository 650 mg  650 mg Rectal Q6H PRN Ahmad A Yimi, DO        bumetanide (BUMEX) injection 1 mg  1 mg IntraVENous Once Ahmad A Yimi, DO        [START ON 2022] bumetanide (BUMEX) injection 2 mg  2 mg IntraVENous Q12H Ahmad A Yimi, DO         Allergies   Allergen Reactions    Morphine Nausea And Vomiting       REVIEW OF SYSTEMS  10 systems reviewed, pertinent positives per HPI otherwise noted to be negative. PHYSICAL EXAM  BP (!) 205/114   Pulse (!) 108   Temp 97.6 °F (36.4 °C) (Oral)   Resp 20   Ht 5' 9\" (1.753 m)   Wt 257 lb (116.6 kg)   SpO2 97%   BMI 37.95 kg/m²   GENERAL APPEARANCE: Awake and alert. Cooperative. Obese. Appears chronically ill and acutely short of breath. HEAD: Normocephalic. Atraumatic. EYES: PERRL. EOM's grossly intact. ENT: Mucous membranes are moist.   NECK: Supple, trachea midline. HEART: RR rate 106. Normal S1, S2. No murmurs, rubs or gallops. LUNGS: Tachypnea, poor to moderate air movement, absent breath sounds at the left lung base. Intermittent nonproductive cough observed. No audible wheezing. ABDOMEN: Soft. Non-distended. Non-tender. No guarding or rebound. Normal Bowel sounds. EXTREMITIES: Bilateral peripheral edema. MAEE. No acute deformities. SKIN: Warm and dry. No acute rashes. NEUROLOGICAL: Alert and oriented X 3. CN II-XII intact. No gross facial drooping. Strength symmetrical.   PSYCHIATRIC: Normal mood and affect. LABS  I have reviewed all labs for this visit. Results for orders placed or performed during the hospital encounter of 09/23/22   COVID-19, Rapid    Specimen: Nasopharyngeal Swab   Result Value Ref Range    SARS-CoV-2, NAAT Not Detected Not Detected   CBC with Auto Differential   Result Value Ref Range    WBC 9.5 4.0 - 11.0 K/uL    RBC 4.29 4. 20 - 5.90 M/uL    Hemoglobin 12.3 (L) 13.5 - 17.5 g/dL    Hematocrit 37.8 (L) 40.5 - 52.5 %    MCV 88.0 80.0 - 100.0 fL    MCH 28.6 26.0 - 34.0 pg    MCHC 32.5 31.0 - 36.0 g/dL    RDW 16.5 (H) 12.4 - 15.4 %    Platelets 087 472 - 721 K/uL    MPV 7.6 5.0 - 10.5 fL    Neutrophils % 89.4 %    Lymphocytes % 4.5 %    Monocytes % 3.5 %    Eosinophils % 1.6 %    Basophils % 1.0 %    Neutrophils Absolute 8.5 (H) 1.7 - 7.7 K/uL    Lymphocytes Absolute 0.4 (L) 1.0 - 5.1 K/uL    Monocytes Absolute 0.3 0.0 - 1.3 K/uL Eosinophils Absolute 0.2 0.0 - 0.6 K/uL    Basophils Absolute 0.1 0.0 - 0.2 K/uL   Comprehensive Metabolic Panel   Result Value Ref Range    Sodium 134 (L) 136 - 145 mmol/L    Potassium 4.5 3.5 - 5.1 mmol/L    Chloride 91 (L) 99 - 110 mmol/L    CO2 25 21 - 32 mmol/L    Anion Gap 18 (H) 3 - 16    Glucose 201 (H) 70 - 99 mg/dL    BUN 55 (H) 7 - 20 mg/dL    Creatinine 6.6 (HH) 0.9 - 1.3 mg/dL    GFR Non-African American 9 (A) >60    GFR  11 (A) >60    Calcium 9.0 8.3 - 10.6 mg/dL    Total Protein 7.7 6.4 - 8.2 g/dL    Albumin 4.6 3.4 - 5.0 g/dL    Albumin/Globulin Ratio 1.5 1.1 - 2.2    Total Bilirubin 0.4 0.0 - 1.0 mg/dL    Alkaline Phosphatase 100 40 - 129 U/L    ALT 11 10 - 40 U/L    AST 19 15 - 37 U/L   Magnesium   Result Value Ref Range    Magnesium 2.10 1.80 - 2.40 mg/dL   Brain Natriuretic Peptide   Result Value Ref Range    Pro-BNP >70,000 (H) 0 - 124 pg/mL   Blood gas, arterial   Result Value Ref Range    pH, Arterial 7.457 (H) 7.350 - 7.450    pCO2, Arterial 32.0 (L) 35.0 - 45.0 mmHg    pO2, Arterial 395.1 (H) 75.0 - 108.0 mmHg    HCO3, Arterial 22.1 21.0 - 29.0 mmol/L    Base Excess, Arterial -1.0 -3.0 - 3.0 mmol/L    Hemoglobin, Art, Extended 12.3 (L) 13.5 - 17.5 g/dL    O2 Sat, Arterial 99.4 >92 %    Carboxyhgb, Arterial 0.4 0.0 - 1.5 %    Methemoglobin, Arterial 0.2 <1.5 %    TCO2, Arterial 23.1 Not Established mmol/L    O2 Therapy Unknown    Troponin   Result Value Ref Range    Troponin 0.14 (H) <0.01 ng/mL   Lactic Acid   Result Value Ref Range    Lactic Acid 1.3 0.4 - 2.0 mmol/L   Sample possible blood bank testing   Result Value Ref Range    Specimen Status KIMBERLY    Procalcitonin   Result Value Ref Range    Procalcitonin 0.46 (H) 0.00 - 0.15 ng/mL   EKG 12 Lead   Result Value Ref Range    Ventricular Rate 110 BPM    Atrial Rate 110 BPM    P-R Interval 126 ms    QRS Duration 92 ms    Q-T Interval 356 ms    QTc Calculation (Bazett) 481 ms    P Axis 34 degrees    R Axis 4 degrees    T Axis 123 degrees    Diagnosis       Sinus tachycardiaT wave abnormality, consider lateral ischemiaAbnormal ECGWhen compared with ECG of 02-AUG-2022 06:25,Vent. rate has increased BY  42 BPM       EKG  Sinus tachycardia, rate 110, normal axis, QTC prolonged at 41, no acute ST segment changes. Lateral T wave inversions appear old from prior on 8/2/2022      RADIOLOGY  X-RAYS:  I have reviewed radiologic plain film image(s). ALL OTHER NON-PLAIN FILM IMAGES SUCH AS CT, ULTRASOUND AND MRI HAVE BEEN READ BY THE RADIOLOGIST. XR CHEST PORTABLE   Final Result   Moderate left pleural effusion with associated left lower lobe airspace   opacities. Rechecks: Physical assessment performed. Appears more comfortable on reevaluation      Critical Care: I personally saw the patient and independently provided 36 minutes of non-concurrent critical care out of the total shared critical care time provided. Management of acute on chronic respiratory failure, volume overload, uncontrolled hypertension, treatment of pneumonia with IV antibiotics. ED COURSE/MDM  Patient seen and evaluated. Old records reviewed. Labs and imaging reviewed and results discussed with patient. Chronically ill patient with extensive past medical history here with dyspnea worsening over the past 3 days he only had 45 minutes of hemodialysis before he was disconnected and brought to the ER by EMS. On arrival patient on CPAP. On further questioning he has been feeling ill for the last couple days with cough, chest tightness, nausea, vomiting. Work-up with left pleural effusion and he does have decreased breath sounds at the left lung base. There is also a possibility of left lower lobe pneumonia. Patient's respiratory distress seems to be a mixed picture as he has underlying COPD, asthma, appears volume overloaded and has a decreased left lung volume due to pleural effusion as well as probable pneumonia.   He received nitroglycerin and IV Lasix. Admitted to the hospitalist service. Current Discharge Medication List          CLINICAL IMPRESSION  1. Acute on chronic respiratory failure, unspecified whether with hypoxia or hypercapnia ([address]    2. Pleural effusion on left    3. Pneumonia of left lower lobe due to infectious organism    4. Uncontrolled hypertension        Blood pressure (!) 205/114, pulse (!) 108, temperature 97.6 °F (36.4 °C), temperature source Oral, resp. rate 20, height 5' 9\" (1.753 m), weight 257 lb (116.6 kg), SpO2 97 %. DISPOSITION  Chuckie Chung was admitted in stable condition.         Marcello Barone MD  09/23/22 [id number]

## 2022-09-25 LAB
A/G RATIO: 1.2 (ref 1.1–2.2)
ALBUMIN SERPL-MCNC: 3.5 G/DL (ref 3.4–5)
ALP BLD-CCNC: 77 U/L (ref 40–129)
ALT SERPL-CCNC: 9 U/L (ref 10–40)
ANION GAP SERPL CALCULATED.3IONS-SCNC: 15 MMOL/L (ref 3–16)
AST SERPL-CCNC: 11 U/L (ref 15–37)
BASOPHILS ABSOLUTE: 0.1 K/UL (ref 0–0.2)
BASOPHILS RELATIVE PERCENT: 1.4 %
BILIRUB SERPL-MCNC: <0.2 MG/DL (ref 0–1)
BUN BLDV-MCNC: 45 MG/DL (ref 7–20)
CALCIUM SERPL-MCNC: 8.6 MG/DL (ref 8.3–10.6)
CHLORIDE BLD-SCNC: 95 MMOL/L (ref 99–110)
CO2: 22 MMOL/L (ref 21–32)
CREAT SERPL-MCNC: 6.2 MG/DL (ref 0.9–1.3)
EOSINOPHILS ABSOLUTE: 0.4 K/UL (ref 0–0.6)
EOSINOPHILS RELATIVE PERCENT: 6.1 %
ESTIMATED AVERAGE GLUCOSE: 159.9 MG/DL
GFR AFRICAN AMERICAN: 11
GFR NON-AFRICAN AMERICAN: 9
GLUCOSE BLD-MCNC: 105 MG/DL (ref 70–99)
GLUCOSE BLD-MCNC: 109 MG/DL (ref 70–99)
GLUCOSE BLD-MCNC: 113 MG/DL (ref 70–99)
GLUCOSE BLD-MCNC: 117 MG/DL (ref 70–99)
HBA1C MFR BLD: 7.2 %
HBV SURFACE AB TITR SER: 33.27 MIU/ML
HCT VFR BLD CALC: 31.9 % (ref 40.5–52.5)
HEMOGLOBIN: 10.4 G/DL (ref 13.5–17.5)
HEPATITIS B SURFACE ANTIGEN INTERPRETATION: NORMAL
LYMPHOCYTES ABSOLUTE: 1 K/UL (ref 1–5.1)
LYMPHOCYTES RELATIVE PERCENT: 14.2 %
MCH RBC QN AUTO: 28.8 PG (ref 26–34)
MCHC RBC AUTO-ENTMCNC: 32.6 G/DL (ref 31–36)
MCV RBC AUTO: 88.3 FL (ref 80–100)
MONOCYTES ABSOLUTE: 0.6 K/UL (ref 0–1.3)
MONOCYTES RELATIVE PERCENT: 8 %
NEUTROPHILS ABSOLUTE: 4.9 K/UL (ref 1.7–7.7)
NEUTROPHILS RELATIVE PERCENT: 70.3 %
PDW BLD-RTO: 15.8 % (ref 12.4–15.4)
PERFORMED ON: ABNORMAL
PLATELET # BLD: 260 K/UL (ref 135–450)
PMV BLD AUTO: 7.6 FL (ref 5–10.5)
POTASSIUM REFLEX MAGNESIUM: 4.9 MMOL/L (ref 3.5–5.1)
RBC # BLD: 3.61 M/UL (ref 4.2–5.9)
SODIUM BLD-SCNC: 132 MMOL/L (ref 136–145)
TOTAL PROTEIN: 6.4 G/DL (ref 6.4–8.2)
WBC # BLD: 7 K/UL (ref 4–11)

## 2022-09-25 PROCEDURE — 87340 HEPATITIS B SURFACE AG IA: CPT

## 2022-09-25 PROCEDURE — 2700000000 HC OXYGEN THERAPY PER DAY

## 2022-09-25 PROCEDURE — 94761 N-INVAS EAR/PLS OXIMETRY MLT: CPT

## 2022-09-25 PROCEDURE — 80053 COMPREHEN METABOLIC PANEL: CPT

## 2022-09-25 PROCEDURE — 86706 HEP B SURFACE ANTIBODY: CPT

## 2022-09-25 PROCEDURE — 85025 COMPLETE CBC W/AUTO DIFF WBC: CPT

## 2022-09-25 PROCEDURE — 6370000000 HC RX 637 (ALT 250 FOR IP): Performed by: NURSE PRACTITIONER

## 2022-09-25 PROCEDURE — 94660 CPAP INITIATION&MGMT: CPT

## 2022-09-25 PROCEDURE — 2500000003 HC RX 250 WO HCPCS: Performed by: INTERNAL MEDICINE

## 2022-09-25 PROCEDURE — 94640 AIRWAY INHALATION TREATMENT: CPT

## 2022-09-25 PROCEDURE — 6370000000 HC RX 637 (ALT 250 FOR IP): Performed by: INTERNAL MEDICINE

## 2022-09-25 PROCEDURE — 36415 COLL VENOUS BLD VENIPUNCTURE: CPT

## 2022-09-25 PROCEDURE — 2060000000 HC ICU INTERMEDIATE R&B

## 2022-09-25 PROCEDURE — 2580000003 HC RX 258: Performed by: INTERNAL MEDICINE

## 2022-09-25 PROCEDURE — 99233 SBSQ HOSP IP/OBS HIGH 50: CPT | Performed by: NURSE PRACTITIONER

## 2022-09-25 RX ORDER — INSULIN LISPRO 100 [IU]/ML
0-4 INJECTION, SOLUTION INTRAVENOUS; SUBCUTANEOUS
Status: DISCONTINUED | OUTPATIENT
Start: 2022-09-25 | End: 2022-10-05 | Stop reason: HOSPADM

## 2022-09-25 RX ORDER — CARVEDILOL 12.5 MG/1
TABLET ORAL
Qty: 60 TABLET | Refills: 10 | OUTPATIENT
Start: 2022-09-25

## 2022-09-25 RX ORDER — OXYCODONE HYDROCHLORIDE 15 MG/1
7.5 TABLET ORAL EVERY 4 HOURS PRN
Status: COMPLETED | OUTPATIENT
Start: 2022-09-25 | End: 2022-09-25

## 2022-09-25 RX ORDER — INSULIN LISPRO 100 [IU]/ML
0-4 INJECTION, SOLUTION INTRAVENOUS; SUBCUTANEOUS NIGHTLY
Status: DISCONTINUED | OUTPATIENT
Start: 2022-09-25 | End: 2022-10-05 | Stop reason: HOSPADM

## 2022-09-25 RX ADMIN — QUETIAPINE FUMARATE 50 MG: 25 TABLET ORAL at 23:48

## 2022-09-25 RX ADMIN — NEPHROCAP 1 MG: 1 CAP ORAL at 11:14

## 2022-09-25 RX ADMIN — OXYCODONE 5 MG: 5 TABLET ORAL at 11:23

## 2022-09-25 RX ADMIN — PANTOPRAZOLE SODIUM 40 MG: 40 TABLET, DELAYED RELEASE ORAL at 11:14

## 2022-09-25 RX ADMIN — CLOPIDOGREL BISULFATE 75 MG: 75 TABLET ORAL at 11:15

## 2022-09-25 RX ADMIN — METOPROLOL SUCCINATE 100 MG: 50 TABLET, EXTENDED RELEASE ORAL at 11:14

## 2022-09-25 RX ADMIN — ISOSORBIDE DINITRATE 20 MG: 20 TABLET ORAL at 23:47

## 2022-09-25 RX ADMIN — SACUBITRIL AND VALSARTAN 1 TABLET: 24; 26 TABLET, FILM COATED ORAL at 23:47

## 2022-09-25 RX ADMIN — ISOSORBIDE DINITRATE 20 MG: 20 TABLET ORAL at 11:14

## 2022-09-25 RX ADMIN — TIOTROPIUM BROMIDE AND OLODATEROL 2 PUFF: 3.124; 2.736 SPRAY, METERED RESPIRATORY (INHALATION) at 12:08

## 2022-09-25 RX ADMIN — ISOSORBIDE DINITRATE 20 MG: 20 TABLET ORAL at 16:42

## 2022-09-25 RX ADMIN — BUMETANIDE 2 MG: 0.25 INJECTION, SOLUTION INTRAMUSCULAR; INTRAVENOUS at 11:17

## 2022-09-25 RX ADMIN — BUMETANIDE 2 MG: 0.25 INJECTION, SOLUTION INTRAMUSCULAR; INTRAVENOUS at 23:50

## 2022-09-25 RX ADMIN — OXYCODONE 5 MG: 5 TABLET ORAL at 19:48

## 2022-09-25 RX ADMIN — DULOXETINE HYDROCHLORIDE 30 MG: 30 CAPSULE, DELAYED RELEASE ORAL at 11:15

## 2022-09-25 RX ADMIN — SODIUM CHLORIDE, PRESERVATIVE FREE 10 ML: 5 INJECTION INTRAVENOUS at 11:18

## 2022-09-25 RX ADMIN — SACUBITRIL AND VALSARTAN 1 TABLET: 24; 26 TABLET, FILM COATED ORAL at 11:14

## 2022-09-25 RX ADMIN — PRAVASTATIN SODIUM 40 MG: 40 TABLET ORAL at 11:14

## 2022-09-25 RX ADMIN — OXYCODONE HYDROCHLORIDE 7.5 MG: 15 TABLET ORAL at 23:47

## 2022-09-25 RX ADMIN — METOPROLOL SUCCINATE 100 MG: 50 TABLET, EXTENDED RELEASE ORAL at 23:47

## 2022-09-25 ASSESSMENT — PAIN SCALES - GENERAL
PAINLEVEL_OUTOF10: 10
PAINLEVEL_OUTOF10: 9

## 2022-09-25 ASSESSMENT — PAIN DESCRIPTION - DESCRIPTORS: DESCRIPTORS: THROBBING

## 2022-09-25 ASSESSMENT — PAIN DESCRIPTION - LOCATION
LOCATION: BACK;HIP

## 2022-09-25 ASSESSMENT — ENCOUNTER SYMPTOMS
RESPIRATORY NEGATIVE: 1
GASTROINTESTINAL NEGATIVE: 1

## 2022-09-25 NOTE — PROGRESS NOTES
09/25/22 0832   NIV Type   Equipment Type V60   Mode Bilevel   Mask Type Full face mask   Mask Size Large   Settings/Measurements   IPAP 18 cmH20   CPAP/EPAP 6 cmH2O   Vt (Measured) 784 mL   Rate Ordered 14   Resp 15   FiO2  40 %   Mask Leak (lpm) 24 lpm   Comfort Level Good   Using Accessory Muscles No   SpO2 98   Breath Sounds   Right Upper Lobe Diminished   Right Middle Lobe Diminished   Right Lower Lobe Diminished   Left Upper Lobe Diminished   Left Lower Lobe Diminished   Alarm Settings   Alarms On Y   Low Pressure (cmH2O) 6 cmH2O   High Pressure  30 cmH2O   Apnea (secs) 20 secs   RR Low (bpm) 6   RR High (bpm) 40 br/min   Oxygen Therapy/Pulse Ox   O2 Therapy Oxygen   $Oxygen $Daily Charge   O2 Device PAP (positive airway pressure)   SpO2 98 %   $Pulse Oximeter $Spot check (multiple/continuous)

## 2022-09-25 NOTE — PROGRESS NOTES
/69   Pulse 62   Temp 97.6 °F (36.4 °C) (Oral)   Resp 16   Ht 5' 9\" (1.753 m)   Wt 231 lb 4.2 oz (104.9 kg)   SpO2 100%   BMI 34.15 kg/m²  on 3L O2 per nasal cannula. Pt was taken off bipap, placed on the 3L. Pt sitting on edge of bed. Pt with complaints of back and hip pain \"10\", prn roxicodone given per pt request.  Pt with intermittent dyspnea at rest, dyspnea with exertion. Lungs diminished. NSR on tele. Pt denies any other needs at this time. Bedside table, call light, food, fluids within reach. Writer explained importance of bed alarm to pt, with pt verbalizing understanding, although will not allow writer to turn on. Writer instructed pt to call out for any needs and assistance, urinal within reach. Will continue to monitor.   Anabel Joyner RN  9/25/2022

## 2022-09-25 NOTE — PROGRESS NOTES
Low SSI humalog ordered for ACHS instead of every 4 hours for NPO that was in place.   Rivas Brown RN  9/25/2022

## 2022-09-25 NOTE — PROGRESS NOTES
09/24/22 2345   NIV Type   Equipment Type V60   Mode Bilevel   Mask Type Full face mask   Mask Size Large   Settings/Measurements   PIP Observed 20 cm H20   IPAP 18 cmH20   CPAP/EPAP 6 cmH2O   Vt (Measured) 540 mL   Resp 15   FiO2  40 %   Minute Volume (L/min) 8 Liters   Mask Leak (lpm) 33 lpm   Comfort Level Good   Using Accessory Muscles No   Patient's Home Machine No

## 2022-09-25 NOTE — PROGRESS NOTES
[facility]  Cardiology  Progress Note    Admission date:  2022    Reason for follow up visit: CHF    HPI/CC: Pavel Seo is a 45 y.o. male who presented 2022 for SOB. He has known HFrEF with ESRD on HD, though could not complete full session Friday due to SOB. Rhythm has been sinus. Subjective: States he is tired, wakes up but quickly goes back to sleep. States SOB better and no chest pain today. Says he just wants to sleep, not engaging with questions. Vitals:  Blood pressure (!) 169/88, pulse 78, temperature 97.7 °F (36.5 °C), temperature source Oral, resp. rate 15, height 5' 9\" (1.753 m), weight 231 lb 4.2 oz (104.9 kg), SpO2 99 %.   Temp  Av.9 °F (36.6 °C)  Min: 97.7 °F (36.5 °C)  Max: 98.2 °F (36.8 °C)  Pulse  Av.5  Min: 78  Max: 99  BP  Min: 128/59  Max: 176/104  SpO2  Av.2 %  Min: 95 %  Max: 99 %  FiO2   Av %  Min: 40 %  Max: 40 %    24 hour I/O    Intake/Output Summary (Last 24 hours) at 2022 0703  Last data filed at 2022 1930  Gross per 24 hour   Intake 1170 ml   Output 3200 ml   Net -2030 ml     Current Facility-Administered Medications   Medication Dose Route Frequency Provider Last Rate Last Admin    [Held by provider] apixaban (ELIQUIS) tablet 5 mg  5 mg Oral BID Margaret Geller DO   5 mg at 22 0934    albuterol sulfate HFA (PROVENTIL;VENTOLIN;PROAIR) 108 (90 Base) MCG/ACT inhaler 2 puff  2 puff Inhalation Q6H PRN Ondina Geller DO        clopidogrel (PLAVIX) tablet 75 mg  75 mg Oral Daily Margaret Geller DO   75 mg at 22 0935    cyclobenzaprine (FLEXERIL) tablet 5 mg  5 mg Oral TID PRN Emiliana Henson DO        DULoxetine (CYMBALTA) extended release capsule 30 mg  30 mg Oral Daily Margaret Geller DO   30 mg at 22 0935    ipratropium-albuterol (DUONEB) nebulizer solution 3 mL  1 vial Inhalation Q6H PRN Ktahie Yvonne Geller DO        linaclotide (LINZESS) capsule 145 mcg-- PATIENT SUPPLIED (Patient Supplied)  145 mcg Oral QAM AC Ahmad A Yimi, DO        isosorbide dinitrate (ISORDIL) tablet 20 mg  20 mg Oral TID Rosanna Riri A Yimi, DO   20 mg at 09/24/22 2140    pantoprazole (PROTONIX) tablet 40 mg  40 mg Oral QAM AC Ahmad A Yimi, DO   40 mg at 09/24/22 0935    pravastatin (PRAVACHOL) tablet 40 mg  40 mg Oral Daily Ahmad A Yimi, DO   40 mg at 09/24/22 0934    tiotropium-olodaterol (STIOLTO) 2.5-2.5 MCG/ACT inhaler 2 puff  2 puff Inhalation Daily Ahmad A Yimi, DO   2 puff at 09/24/22 0846    QUEtiapine (SEROQUEL) tablet 50 mg  50 mg Oral Nightly Ahmad A Yimi, DO   50 mg at 09/24/22 2140    metoprolol succinate (TOPROL XL) extended release tablet 100 mg  100 mg Oral BID Ahmad A Yimi, DO   100 mg at 09/24/22 2140    docusate sodium (COLACE) capsule 100 mg  100 mg Oral Daily Ahmad A Yimi, DO        b complex-C-folic acid (NEPHROCAPS) capsule 1 mg  1 capsule Oral Daily Anita Samaniego MD   1 mg at 09/24/22 1815    sodium chloride flush 0.9 % injection 10 mL  10 mL IntraVENous 2 times per day Rosanna Riri A Yimi, DO   10 mL at 09/24/22 2141    sodium chloride flush 0.9 % injection 10 mL  10 mL [facility]ous [facility]        0.9 % sodium chloride infusion   IntraVENous PRN Rosanna Riri A Yimi, DO        promethazine (PHENERGAN) tablet 12.5 mg  12.5 mg Oral Q6H PRN [facility]   12.5 mg at 09/24/22 1134    acetaminophen (TYLENOL) tablet 650 mg  650 mg Oral Q6H PRN [facility]        Or    acetaminophen (TYLENOL) suppository 650 mg  650 mg Rectal Q6H PRN Semaj Geller, DO        bumetanide (BUMEX) injection 2 mg  2 mg IntraVENous Q12H [facility]   2 mg at 09/24/22 2139    oxyCODONE (ROXICODONE) immediate release tablet 5 mg  5 mg Oral Q4H PRN Margaret Geller DO        glucose chewable tablet 16 g  4 tablet Oral PRN Rosanna Riri ERICA Geller DO        dextrose bolus 10% 125 mL  125 mL IntraVENous PRN Margaret Geller DO        Or    dextrose bolus 10% 250 mL  250 mL IntraVENous PRN Mariela Davis, DO        glucagon (rDNA) injection 1 mg  1 mg SubCUTAneous PRN Margaret Geller DO        dextrose 10 % infusion   IntraVENous Continuous PRN Erichd ERICA Geller DO        insulin lispro (HUMALOG) injection vial 0-4 Units  0-4 Units SubCUTAneous Q4H Erichd ERICA Geller DO         Review of Systems   Constitutional: Negative. Respiratory: Negative. Cardiovascular: Negative. Gastrointestinal: Negative. Neurological: Negative. Objective:     Telemetry monitor: SR    Physical Exam:  Constitutional:  Comfortable and alert, NAD, appears older than stated age  Eyes: PERRL, sclera nonicteric  Neck:  Supple, no masses, no thyroidmegaly, JVD difficult to assess  Skin:  Warm and dry; no rash or lesions  Heart:  Regular, normal apex, S1 and S2 normal, no M/G/R  Lungs:  Normal respiratory effort; decreased  Abdomen: soft, non tender, + bowel sounds  Extremities:  No edema or cyanosis; no clubbing  Neuro: alert and oriented, moves legs and arms equally, normal mood and affect    Data Reviewed:    Coronary angiogram 6/2022:  High risk abnormal stress test  Ischemic or mildly  Non-STEMI  PROCEDURES PERFORMED    Right heart catheterization  Left heart catheterization  LVgram  Coronary angiogam  Coronary cath  Monitoring of moderate conscious sedation  PROCEDURE DESCRIPTION   Risks/benefits/alternatives/outcomes were discussed with patient and/or family and informed consent was obtained. Using the [facility] scale, the patient's right radial artery was found to be a level B. Patient was prepped draped in the usual sterile fashion. Local anaesthetic was applied over puncture site. Using ultrasound guidance and a front wall technique, a 4/5 Divehi Terumo sheath was inserted into right radial artery. Verapamil, nitroglycerin, nicardipine were administered through the sheath. Heparin was administered. Diagnostic 5 Western [place]yanne pigtail, TIG, Binu catheters were used for diagnostic angiograms.   At the conclusion of the procedure, a TR band was placed over the puncture site and hemostasis was obtained. There were no immediate complications. I supervised sedation from 9:53 AM to 11:08 AM with versed 5 mg/fentanyl 125 mcg during the procedure. An independent trained observer pushed meds at my direction. We monitored the patient's level of consciousness and vital signs/physiologic status throughout the procedure duration (see times listed previously). 205 cc contrast was utilized. <20cc EBL. Case/findings/plans discussed with patient's wife, she understood/agreed. FINDINGS     CHAMBER PRESSURE SATURATION   RA  10 60%   RV  47/9     PA  50/20  67%   PW  19     AORTA  101/50  95%      NANCY CARDIAC OUTPUT  5.8 L/min   SVR  756    PVR  234      LVEDP  21   GRADIENT ACROSS AORTIC VALVE  less than 5 mmHg   LV FUNCTION EF 20%   WALL MOTION  severe global hypokinesis with regional variation   MITRAL REGURGITATION  mild     LM Less than 10% [id number] stenosis            LAD Moderately calcified, 20 to 30% proximal-mid stenosis, distal vessel tapers at the apex with 60% diffuse disease. D1 has an ostial/proximal 75 to 80% stenosis. LCX  Calcified, proximal-mid 75 to 80% stenosis. Distal vessel is tortuous with 70 to 85% diffuse stenosis with more discrete stenosis noted distally. OM 1 is a very small vessel with ostial/proximal 80% stenosis and 60 to 70% diffuse disease in the erzkvsju-hru-yevezn vessel. Davie Mering RCA Dominant, calcified, tortuous, there is a proximal-mid stent with 60 to 70% in-stent restenosis. Distal vessel 20 to 30% stenosis     CONCLUSIONS:    Mild to moderately increased filling pressures  Patent RCA stent with moderate to borderline severe in-stent restenosis  Severe circumflex and D1 stenosis  Continue medical management, consider outpatient high risk PCI of above territories pending outpatient clinical follow-up.   Currently medical management seems best given comorbidities and need for additional fluid removal.  If PCI is pursued, will likely need general anesthesia. Patient had difficulty lying still on Cath Lab table. Okay to resume Eliquis tomorrow, case/plan/findings discussed with patient and wife, they understand/agree, they stated it is incorrectly stated in chart the patient would refuse blood, he is okay to receive blood products if needed. Continue beta-blocker and Entresto    Stress test 6/2/2022:   Severe LV dysfunction EF 26%    Global hypokinesis with regional variation, more pronounced in inferolateral    wall and apex    Large mainly fixed defect in inferior wall, extends to portions of lateral    wall and apex with moderate darrick-infarct ischemia      Echo 6/3/2022:  Definity® used for myocardial border enhancement. Left ventricular systolic function is severely reduced with a visually   estimated ejection fraction of 20-25 %. EF estimated by Jasmine's method at 24 %. The left ventricle is mildly dilated in size with normal wall thickness. Severe global hypokinesis with regional variation. Grade I diastolic dysfunction with normal LV pressure. There is no evidence of a left ventricular thrombus. Right ventricular systolic function is reduced. A 29 mm Langford Leyda S3 bioprosthetic aortic valve appears well seated   with normal Doppler values. Inadequate tricuspid valve regurgitation to estimate systolic pulmonary   artery pressure. There is a moderate left pleural effusion noted. Coronary angiogram 1/14/2022:  1. Left heart catheterization  2. Selective left and right coronary angiogram  3. PCI of the RCA with PATRICIA  Procedure Findings:  1. 99% mid RCA  2. 60-70% CIRC and DIAG  3. Elevated left heart hemodynamics              ~LVEDP 30       Details:              Domenico Evans was brought to the cardiac catheterization lab in a fasting state after informed consent was obtained. If the patient was able to provide written consent, it was obtained. The patient's vitals were monitored through out the procedure. The patient was sedated using the appropriate levels of sedation and ASA guidelines. The appropriate access site area was prepped and drapped in a sterile fashion. The area was anesthetized with 2% lidocaine. Using the modified Seldinger technique, an arterial sheath was introduced into the arterial access site using a single anterior wall puncture. The sheath was flushed and prepped in usual fashion. Catheters used during the procedure included 5 Scottish TIG 4.0 catheter. The catheters were advanced and removed over a .035\" wire, into the appropriate positions. Multiple angiographic views were obtained. An LV gram was not obtained. ~The interventional portion of the procedure was completed utilizing a multipurpose 6 [place]ne guide. Multipurpose guide was advanced into the right coronary ostium. A gentleman therapy aspirin, Plavix, Angiomax was initiated. A BMW wire was advanced into the distal right coronary artery. The lesion was initially predilated with a 2.75 x 15 mm Euphora balloon followed by a 3.5 mm x 20 mm Euphora balloon and subsequently stented with a resolute [place] 4.0 mm x 38 mm balloon. We did experience a no reflow phenomenon. We separately done. A twin pass catheter and then did local drug delivery with 200 mcg of nitroprusside and 200 mcgnitroglycerin. Provided restoration of SCARLETT-3 flow. Findings:  1. Left main coronary artery was normal. It gave off the left anterior descending artery and left circumflex. 2. Left anterior descending artery has 60% severe atherosclerotic disease. It was moderate in size. It gave off septal perforators and a moderate sized diagonal branch. The LAD covered the entire apex of the left ventricle. 3. Left circumflex has 60% severe atherosclerotic disease. It was moderate in size. There was a moderate sized obtuse marginal branch.   4. Right coronary PROTIME 16.7 07/17/2022 03:10 AM    PROTIME 14.9 06/22/2022 10:35 AM    PROTIME 15.1 05/29/2022 11:55 AM     PTT No results found for: PTT   Lab Results   Component Value Date/Time    MG 2.10 09/23/2022 04:48 PM      Lab Results   Component Value Date/Time    TSH 2.15 03/17/2022 08:33 PM     All labs and imaging reviewed today    Assessment:  Elevated troponin: chronic elevation due to poor clearance with ESRD  CAD: LCx and Diag lesions along with moderate-severe RCA restenosis, medical management due to comorbidities 6/2022; s/p PATRICIA RCA 1/14/2022; s/p PATRICIA LAD 2015  Acute on chronic systolic CHF: ongoing  Bicuspid AV/severe AS: s/p TAVR 10/2019  Ischemic cardiomyopathy: EF 20-25% on echo 6/3/2022; EF 40-45% on echo 1/12/2022  Paroxysmal atrial fibrillation: stable              - [id number] score >2 on eliquis  PVD: s/p PTA/stent R external iliac 5/2019  Bradycardia/transient sinus pauses: noted 12/12/2020, no recurrence  HTN: stable  HLD: improved, LDL 86, statin and recheck  ESRD: on HD MWF; follows with Dr. Kalani Plata  DM: hgb a1c 6.6  Anemia  ZAINAB  History of CVA  Noncompliance    Plan:   1. Resume home entresto for CHF and cardiomyopathy, potassium has been stable  2. Interventional cardiology evaluation was recommended; last cath 6/2022 showed LCx and diag lesions as well as moderate-severe RCA in stent restenosis, medical management recommended at that time  3. Fluid removal with HD or consider thoracentesis for L pleural effusion if additional fluid removal not feasible  4. Eliquis held in case cath needed  5.  Continue plavix, toprol, statin, isordil    JAY Jara-CNP  [employer]  [phone]

## 2022-09-25 NOTE — PROGRESS NOTES
Department of Internal Medicine  Nephrology Progress Note        SUBJECTIVE:    We are following this patient for ESRD management. The patient was seen and examined; he feels well today with no CP, SOB, nausea or vomiting. ROS: No fever or chills. Social: No family at bedside. Physical Exam:    VITALS:  /69   Pulse 62   Temp 97.6 °F (36.4 °C) (Oral)   Resp 16   Ht 5' 9\" (1.753 m)   Wt 231 lb 4.2 oz (104.9 kg)   SpO2 100%   BMI 34.15 kg/m²     General appearance: Seems comfortable, no acute distress. Neck: Trachea midline, thyroid normal.   Lungs:  Non labored breathing, CTA to anterior auscultation. Heart:  S1S2 normal, rub or gallop. No peripheral edema. Abdomen: Soft, non-tender, no organomegaly. Skin: No lesions or rashes, warm to touch.      DATA:    CBC with Differential:    Lab Results   Component Value Date/Time    WBC 7.0 09/25/2022 04:52 AM    RBC 3.61 09/25/2022 04:52 AM    HGB 10.4 09/25/2022 04:52 AM    HCT 31.9 09/25/2022 04:52 AM     09/25/2022 04:52 AM    MCV 88.3 09/25/2022 04:52 AM    MCH 28.8 09/25/2022 04:52 AM    MCHC 32.6 09/25/2022 04:52 AM    RDW 15.8 09/25/2022 04:52 AM    SEGSPCT 73.4 05/17/2013 06:50 AM    BANDSPCT 8 03/03/2022 07:41 AM    METASPCT 2 03/03/2022 07:41 AM    LYMPHOPCT 14.2 09/25/2022 04:52 AM    MONOPCT 8.0 09/25/2022 04:52 AM    MYELOPCT 1 03/03/2022 07:41 AM    BASOPCT 1.4 09/25/2022 04:52 AM    MONOSABS 0.6 09/25/2022 04:52 AM    LYMPHSABS 1.0 09/25/2022 04:52 AM    EOSABS 0.4 09/25/2022 04:52 AM    BASOSABS 0.1 09/25/2022 04:52 AM    DIFFTYPE Auto 05/17/2013 06:50 AM     BMP:    Lab Results   Component Value Date/Time     09/25/2022 04:52 AM    K 4.9 09/25/2022 04:52 AM    CL 95 09/25/2022 04:52 AM    CO2 22 09/25/2022 04:52 AM    BUN 45 09/25/2022 04:52 AM    LABALBU 3.5 09/25/2022 04:52 AM    CREATININE 6.2 09/25/2022 04:52 AM    CALCIUM 8.6 09/25/2022 04:52 AM    GFRAA 11 09/25/2022 04:52 AM    GFRAA >60 05/17/2013 06:50 AM LABGLOM 9 09/25/2022 04:52 AM    GLUCOSE 105 09/25/2022 04:52 AM       IMPRESSION/RECOMMENDATIONS:      1- ESRD: We will arrange for hemodialysis tomorrow per MWF schedule. 2- No significant electrolytes disorders noted, apply free water restriction. 3- HTN: Blood pressure within acceptable range. 4- Anemia: Stable hemoglobin, monitor.

## 2022-09-25 NOTE — PROGRESS NOTES
Hospitalist Progress Note    Date of Admission: 9/23/2022    Chief Complaint: SOB    Hospital Course: 45 y.o. male who presented to [facility] with past medical history of arthritis, asthma, CAD, GERD, history of blood clots, hyperlipidemia, end-stage renal disease on hemodialysis, HFrEF EF 25%, severe aortic stenosis status post TAVR, COPD, depression, presented to the ED with chief complaint of respiratory distress. Patient reported that he he has been compliant with salt diet, does not drink fluids more than 2 L/day, patient also has been going to all his dialysis session 3 times a day in addition to be compliant with CPAP. Patient reported that he went to dialysis today and noticed that he has not been able to complete more than 45 minutes due to worsening severe shortness of breath thus was sent here to the ED. Patient reports otherwise no known alleviating or exacerbating factor reports that he is doing everything he can and it still unclear why he continues to be short of breath. Patient does report association with cough and phlegm production has been going on for few days but no fever    Subjective: SOB improving but still with orthopnea and CASH. No chest pain. Labs:   Recent Labs     09/23/22 1648 09/24/22 0448 09/25/22 0452   WBC 9.5 8.3 7.0   HGB 12.3* 10.9* 10.4*   HCT 37.8* 33.7* 31.9*    257 260       Recent Labs     09/23/22 1648 09/24/22 0448 09/25/22  0452   * 135* 132*   K 4.5 4.7 4.9   CL 91* 94* 95*   CO2 25 21 22   BUN 55* 66* 45*   CREATININE 6.6* 7.6* 6.2*   CALCIUM 9.0 8.7 8.6       Recent Labs     09/23/22 1648 09/24/22 0448 09/25/22  0452   AST 19 12* 11*   ALT 11 8* 9*   BILITOT 0.4 0.3 <0.2   ALKPHOS 100 83 77       No results for input(s): INR in the last 72 hours.     Physical Exam Performed:    /73   Pulse 66   Temp 98 °F (36.7 °C) (Oral)   Resp 15   Ht 5' 9\" (1.753 m)   Wt 231 lb 4.2 oz (104.9 kg)   SpO2 98%   BMI 34.15 kg/m² General appearance:  No apparent distress, appears stated age and cooperative. HEENT:  Pupils equal, round, and reactive to light. Conjunctivae/corneas clear. Neck:  Supple, no jugular venous distention. Trachea midline with full range of motion. Respiratory:  Normal respiratory effort. Significantly diminished on the left. Otherwise clear to auscultation, bilaterally without Rales/Wheezes/Rhonchi. Cardiovascular:  Regular rate and rhythm with normal S1/S2 without murmurs, rubs or gallops. Abdomen:  Soft, non-tender, non-distended with normal bowel sounds. Musculoskelatal:  No clubbing, cyanosis or edema bilaterally. Full range of motion without deformity. Neurologic:  Neurovascularly intact without any focal sensory/motor deficits. Cranial nerves: II-XII intact, grossly non-focal.  Psychiatric:  Alert and oriented, thought content appropriate, normal insight  Skin:  Skin color, texture, turgor normal.  No rashes or lesions. Capillary Refill:  Brisk, < 3 seconds. Peripheral Pulses:  +2 palpable, equal bilaterally. Consults:    IP CONSULT TO HOSPITALIST  IP CONSULT TO HEART FAILURE NURSE/COORDINATOR  IP CONSULT TO DIETITIAN  IP CONSULT TO NEPHROLOGY  IP CONSULT TO CARDIOLOGY  IP CONSULT TO PALLIATIVE CARE    Assessment/Plan:    Active Hospital Problems    Diagnosis     Coronary artery disease involving native coronary artery of native heart without angina pectoris [I25.10]      Priority: High    HFrEF (heart failure with reduced ejection fraction) ([facility] [address] [I50.20]      Priority: Medium    Acute respiratory failure with hypoxia and hypercapnia (HCC) [J96.01, J96.02]      Priority: Medium    Uncontrolled hypertension [I10]      Priority: Medium    Pleural effusion on left [J90]      Acute hypoxic respiratory failure 2/2 CHF  Patient unable to complete dialysis for more than 45 minutes in addition has noted to have worsening left pleural effusion  Improved with BiPAP initially  Wean O2 as tolerated. Acute on chronic HFrEF EF 25%, NYHA III:  Strict I's and O's, Daily weights  Cardiology consulted  Fluid removal with HD. Continue home regimen    End-stage renal disease:  Nephrology consulted. Continue HD    Left Pleural effusion: If not improving with fluid removal, can consider Thoracentesis. Cardiorenal syndrome:  Nephrology and cardiology following    ZAINAB: On BiPAP    Diabetes Mellitus, Type 2: Controlled. Continue SSI. Monitor blood sugar and adjust regimen as needed. Diabetic (Carb-controlled) diet when able. Hypertension, benign: Controlled. Continue current medication regimen, monitor and adjust as needed. Paroxysmal Atrial fibrilliation: unspecified and clinically unable to determine etiology. Controlled on home medication. currently Anticoagulated and Monitored on tele    Depression: continue home medications    Anxiety: Controlled, continue home medications    CAD: Currently controlled on home meds. No current symptoms or evidence of active signs of ischemia. DVT Prophylaxis: On Eliquis  Diet: ADULT DIET;  Regular; 4 carb choices (60 gm/meal)  Diet NPO Exceptions are: Ice Chips, Sips of Water with Meds  Code Status: Full Code  PT/OT Eval Status: NA    Dispo - 2-3 days    Render Boxer, MD

## 2022-09-25 NOTE — PROGRESS NOTES
09/24/22 2035   NIV Type   NIV Started/Stopped On  (FOUND ON)   Equipment Type V60   Mode Bilevel   Mask Type Full face mask   Mask Size Large   Settings/Measurements   PIP Observed 18 cm H20   IPAP 18 cmH20   CPAP/EPAP 3 cmH2O   Vt (Measured) 509 mL   Rate Ordered 14   Resp 18   FiO2  40 %   I Time/ I Time % 1 s   Minute Volume (L/min) 12.7 Liters   Mask Leak (lpm) 25 lpm  (pts beardq)   Comfort Level Good   Using Accessory Muscles No   Patient's Home Machine No

## 2022-09-25 NOTE — PROGRESS NOTES
09/24/22 2035   NIV Type   NIV Started/Stopped On  (FOUND ON)   Equipment Type V60   Mode Bilevel   Mask Type Full face mask   Mask Size Large   Settings/Measurements   PIP Observed 18 cm H20   IPAP 18 cmH20   CPAP/EPAP 6 cmH2O   Vt (Measured) 509 mL   Rate Ordered 14   Resp 18   FiO2  40 %   I Time/ I Time % 1 s   Minute Volume (L/min) 12.7 Liters   Mask Leak (lpm) 25 lpm  (pts beardq)   Comfort Level Good   Using Accessory Muscles No   Patient's Home Machine No

## 2022-09-25 NOTE — PROGRESS NOTES
09/25/22 0343   NIV Type   $NIV $Daily Charge   Equipment Type V60   Mode Bilevel   Mask Type Full face mask   Mask Size Large   Settings/Measurements   PIP Observed 20 cm H20   IPAP 18 cmH20   CPAP/EPAP 6 cmH2O   Vt (Measured) 548 mL   Resp 15   FiO2  40 %   Minute Volume (L/min) 9 Liters   Mask Leak (lpm) 30 lpm   Comfort Level Good   Using Accessory Muscles No   Patient's Home Machine No

## 2022-09-26 LAB
ANION GAP SERPL CALCULATED.3IONS-SCNC: 18 MMOL/L (ref 3–16)
BUN BLDV-MCNC: 63 MG/DL (ref 7–20)
CALCIUM SERPL-MCNC: 8 MG/DL (ref 8.3–10.6)
CHLORIDE BLD-SCNC: 93 MMOL/L (ref 99–110)
CO2: 21 MMOL/L (ref 21–32)
CREAT SERPL-MCNC: 7.5 MG/DL (ref 0.9–1.3)
GFR AFRICAN AMERICAN: 9
GFR NON-AFRICAN AMERICAN: 8
GLUCOSE BLD-MCNC: 129 MG/DL (ref 70–99)
GLUCOSE BLD-MCNC: 134 MG/DL (ref 70–99)
GLUCOSE BLD-MCNC: 138 MG/DL (ref 70–99)
GLUCOSE BLD-MCNC: 138 MG/DL (ref 70–99)
GLUCOSE BLD-MCNC: 146 MG/DL (ref 70–99)
PERFORMED ON: ABNORMAL
POTASSIUM REFLEX MAGNESIUM: 5 MMOL/L (ref 3.5–5.1)
PRO-BNP: ABNORMAL PG/ML (ref 0–124)
SODIUM BLD-SCNC: 132 MMOL/L (ref 136–145)

## 2022-09-26 PROCEDURE — 36415 COLL VENOUS BLD VENIPUNCTURE: CPT

## 2022-09-26 PROCEDURE — 94761 N-INVAS EAR/PLS OXIMETRY MLT: CPT

## 2022-09-26 PROCEDURE — 90935 HEMODIALYSIS ONE EVALUATION: CPT

## 2022-09-26 PROCEDURE — 94640 AIRWAY INHALATION TREATMENT: CPT

## 2022-09-26 PROCEDURE — 6370000000 HC RX 637 (ALT 250 FOR IP): Performed by: INTERNAL MEDICINE

## 2022-09-26 PROCEDURE — 2060000000 HC ICU INTERMEDIATE R&B

## 2022-09-26 PROCEDURE — 6370000000 HC RX 637 (ALT 250 FOR IP): Performed by: NURSE PRACTITIONER

## 2022-09-26 PROCEDURE — 6370000000 HC RX 637 (ALT 250 FOR IP)

## 2022-09-26 PROCEDURE — 6360000002 HC RX W HCPCS

## 2022-09-26 PROCEDURE — 94660 CPAP INITIATION&MGMT: CPT

## 2022-09-26 PROCEDURE — 83880 ASSAY OF NATRIURETIC PEPTIDE: CPT

## 2022-09-26 PROCEDURE — 99223 1ST HOSP IP/OBS HIGH 75: CPT | Performed by: INTERNAL MEDICINE

## 2022-09-26 PROCEDURE — 80048 BASIC METABOLIC PNL TOTAL CA: CPT

## 2022-09-26 PROCEDURE — 2700000000 HC OXYGEN THERAPY PER DAY

## 2022-09-26 RX ORDER — ACETAMINOPHEN 325 MG/1
TABLET ORAL
Status: COMPLETED
Start: 2022-09-26 | End: 2022-09-26

## 2022-09-26 RX ORDER — OXYCODONE AND ACETAMINOPHEN 7.5; 325 MG/1; MG/1
1 TABLET ORAL EVERY 6 HOURS PRN
Status: ON HOLD | COMMUNITY
End: 2022-10-20 | Stop reason: HOSPADM

## 2022-09-26 RX ORDER — HEPARIN SODIUM 1000 [USP'U]/ML
3600 INJECTION, SOLUTION INTRAVENOUS; SUBCUTANEOUS PRN
Status: DISCONTINUED | OUTPATIENT
Start: 2022-09-26 | End: 2022-10-05 | Stop reason: HOSPADM

## 2022-09-26 RX ORDER — HEPARIN SODIUM 1000 [USP'U]/ML
INJECTION, SOLUTION INTRAVENOUS; SUBCUTANEOUS
Status: COMPLETED
Start: 2022-09-26 | End: 2022-09-26

## 2022-09-26 RX ORDER — OXYCODONE AND ACETAMINOPHEN 7.5; 325 MG/1; MG/1
1 TABLET ORAL EVERY 6 HOURS PRN
Status: DISCONTINUED | OUTPATIENT
Start: 2022-09-26 | End: 2022-10-05 | Stop reason: HOSPADM

## 2022-09-26 RX ORDER — CARVEDILOL 12.5 MG/1
TABLET ORAL
Qty: 60 TABLET | Refills: 10 | OUTPATIENT
Start: 2022-09-26

## 2022-09-26 RX ORDER — CITALOPRAM 40 MG/1
TABLET ORAL
Qty: 30 TABLET | Refills: 10 | OUTPATIENT
Start: 2022-09-26

## 2022-09-26 RX ADMIN — CLOPIDOGREL BISULFATE 75 MG: 75 TABLET ORAL at 09:39

## 2022-09-26 RX ADMIN — SACUBITRIL AND VALSARTAN 1 TABLET: 24; 26 TABLET, FILM COATED ORAL at 21:57

## 2022-09-26 RX ADMIN — PRAVASTATIN SODIUM 40 MG: 40 TABLET ORAL at 09:39

## 2022-09-26 RX ADMIN — SACUBITRIL AND VALSARTAN 1 TABLET: 24; 26 TABLET, FILM COATED ORAL at 09:39

## 2022-09-26 RX ADMIN — HEPARIN SODIUM 3600 UNITS: 1000 INJECTION INTRAVENOUS; SUBCUTANEOUS at 19:55

## 2022-09-26 RX ADMIN — ACETAMINOPHEN 650 MG: 325 TABLET ORAL at 18:11

## 2022-09-26 RX ADMIN — METOPROLOL SUCCINATE 100 MG: 50 TABLET, EXTENDED RELEASE ORAL at 09:39

## 2022-09-26 RX ADMIN — METOPROLOL SUCCINATE 100 MG: 50 TABLET, EXTENDED RELEASE ORAL at 21:57

## 2022-09-26 RX ADMIN — OXYCODONE AND ACETAMINOPHEN 1 TABLET: 7.5; 325 TABLET ORAL at 21:56

## 2022-09-26 RX ADMIN — NEPHROCAP 1 MG: 1 CAP ORAL at 09:39

## 2022-09-26 RX ADMIN — ISOSORBIDE DINITRATE 20 MG: 20 TABLET ORAL at 21:57

## 2022-09-26 RX ADMIN — OXYCODONE AND ACETAMINOPHEN 1 TABLET: 7.5; 325 TABLET ORAL at 09:39

## 2022-09-26 RX ADMIN — PANTOPRAZOLE SODIUM 40 MG: 40 TABLET, DELAYED RELEASE ORAL at 13:05

## 2022-09-26 RX ADMIN — ISOSORBIDE DINITRATE 20 MG: 20 TABLET ORAL at 09:39

## 2022-09-26 RX ADMIN — CYCLOBENZAPRINE 5 MG: 10 TABLET, FILM COATED ORAL at 13:04

## 2022-09-26 RX ADMIN — OXYCODONE AND ACETAMINOPHEN 1 TABLET: 7.5; 325 TABLET ORAL at 15:55

## 2022-09-26 RX ADMIN — CYCLOBENZAPRINE 5 MG: 10 TABLET, FILM COATED ORAL at 02:10

## 2022-09-26 RX ADMIN — TIOTROPIUM BROMIDE AND OLODATEROL 2 PUFF: 3.124; 2.736 SPRAY, METERED RESPIRATORY (INHALATION) at 08:09

## 2022-09-26 RX ADMIN — EPOETIN ALFA-EPBX 5400 UNITS: 10000 INJECTION, SOLUTION INTRAVENOUS; SUBCUTANEOUS at 19:46

## 2022-09-26 RX ADMIN — ISOSORBIDE DINITRATE 20 MG: 20 TABLET ORAL at 13:05

## 2022-09-26 RX ADMIN — DULOXETINE HYDROCHLORIDE 30 MG: 30 CAPSULE, DELAYED RELEASE ORAL at 09:39

## 2022-09-26 RX ADMIN — QUETIAPINE FUMARATE 50 MG: 25 TABLET ORAL at 21:57

## 2022-09-26 ASSESSMENT — PAIN DESCRIPTION - LOCATION
LOCATION: BACK
LOCATION: BACK;HIP
LOCATION: BACK
LOCATION: HEAD
LOCATION: BACK;HIP

## 2022-09-26 ASSESSMENT — PAIN SCALES - GENERAL
PAINLEVEL_OUTOF10: 9
PAINLEVEL_OUTOF10: 10
PAINLEVEL_OUTOF10: 8

## 2022-09-26 ASSESSMENT — PAIN DESCRIPTION - ORIENTATION
ORIENTATION: LOWER
ORIENTATION: LOWER
ORIENTATION: INNER;MID

## 2022-09-26 NOTE — PROGRESS NOTES
09/26/22 0036   NIV Type   $NIV $Daily Charge   Equipment Type v60   Mode Bilevel   Mask Type Full face mask   Mask Size Large   Settings/Measurements   PIP Observed 19 cm H20   IPAP 18 cmH20   CPAP/EPAP 6 cmH2O   Vt (Measured) 599 mL   Rate Ordered 14   Resp 24   FiO2  40 %   I Time/ I Time % 1 s   Minute Volume (L/min) 11.6 Liters   Mask Leak (lpm) 37 lpm   Comfort Level Good   Using Accessory Muscles No   SpO2 97   Patient's Home Machine No   Alarm Settings   Alarms On Y   Low Pressure (cmH2O) 6 cmH2O   High Pressure  30 cmH2O   Apnea (secs) 20 secs   RR Low (bpm) 6   RR High (bpm) 40 br/min

## 2022-09-26 NOTE — PROGRESS NOTES
Dr. Jannet Galindo and Dr. Radha Abreu notified that pt has lost IV access. Attempted x4 to place new IV; then an additional 4x to place US guided IV. No success. Dr. Jannet Galindo would prefer that patient NOT get a midline or central line if there are no reasons he would need an IV d/t his A/CKD. Notified Dr. Jannet Galindo that patient had Bumex IV ordered q12 & may need a thoracentesis if his pleural effusions don't improve with HD. Dr. Jannet Galindo ok with discontinuing bumex and would still like to hold off on midline/central at this time.

## 2022-09-26 NOTE — PROGRESS NOTES
[facility] Progress Note           Subjective/   45y.o. year old male who we are seeing in consultation for ESKD on HD. HPI:  Last HD on 9/24 with 3.2 liters removed, post-weight of 104.9 kg. States shortness of breath better, but did not improve with HD, remains on 3 L NC.    ROS:  No chest pain, intake adequate. Objective/   GEN:  Chronically ill, /72   Pulse 67   Temp 97.6 °F (36.4 °C) (Oral)   Resp 16   Ht 5' 9\" (1.753 m)   Wt 222 lb 3.2 oz (100.8 kg)   SpO2 97%   BMI 32.81 kg/m²   HEENT: non-icteric, no JVD  CV: S1, S2 without m/r/g; no LE edema  RESP: CTA B with bilateral wheezing w/o r/r; breathing wnl  ABD: +bs, soft, nt, no hsm  SKIN: warm, no rashes  ACCESS: Left IJ [facility]    Data/  Recent Labs     09/23/22 1648 09/24/22 0448 09/25/22  0452   WBC 9.5 8.3 7.0   HGB 12.3* 10.9* 10.4*   HCT 37.8* 33.7* 31.9*   MCV 88.0 88.6 88.3    257 260     Recent Labs     09/23/22 1648 09/24/22 0448 09/25/22  0452 09/26/22  0451   * 135* 132* 132*   K 4.5 4.7 4.9 5.0   CL 91* 94* 95* 93*   CO2 25 21 22 21   GLUCOSE 201* 144* 105* 146*   MG 2.10  --   --   --    BUN 55* 66* 45* 63*   CREATININE 6.6* 7.6* 6.2* 7.5*   LABGLOM 9* 7* 9* 8*   GFRAA 11* 9* 11* 9*       Assessment/     - End stage kidney disease - on HD Mon-Wed-Fri    - NSTEMI    - Hypertension    - s/p TAVR    - COPD/ZAINAB - requiring bi-level    - Anemia of chronic disease - DAVON with HD      Plan/     - HD today per schedule - UF as tolerated with crit line monitoring, .5 kg  - Resume DAVON with HD   - Trend labs, bp's    ____________________________________  Dean Sanches MD  [facility]  [email or web address]  Office: [phone]

## 2022-09-26 NOTE — CARE COORDINATION
CASE MANAGEMENT INITIAL ASSESSMENT      Reviewed chart and completed assessment with patient:yes  Family present: no  Explained Case Management role/services. Primary contact information:Crystal Brito [phone]    Health Care Decision Maker :   Primary Decision Maker: Crystal Ann - Spouse - [phone]    Secondary Decision Maker: Maggie Carney - Brother/Sister - [phone]          Can this person be reached and be able to respond quickly, such as within a few minutes or hours? Yes      Admit date/status:09/23/22  Diagnosis:Pleural effusion   Is this a Readmission?:  No      Insurance:[employer] dual   Precert required for SNF: Yes       3 night stay required: No    Living arrangements, Adls, care needs, prior to admission:mobile home with spouse. States he needs rehab due to frequent falls at home. Durable Medical Equipment at home:  Walker_X_Cane_X_RTS__ BSC__Shower Chair_X_  02_X- [employer] 2 lpm baseline_ HHN__ CPAP_X_  BiPap__  Hospital Bed__ W/C___ Other_____    Services in the home and/or outpatient, prior to [facility], although Pt states that they have not been servicing due to staffing issues. Current PCP:Salvador Swain                                Medications:yes Prescription coverage? Yes Will pt require financial assistance with medications No     Transportation needs: family     Dialysis Facility (if applicable)   Name: [facility] [place]   Dialysis Schedule: [facility]  Phone:  Fax:    PT/OT recs:TBD    Hospital Exemption Notification (HEN):n/a at this time    Barriers to discharge:none    Plan/comments:wants SNF placement and would like referral to [facility]. Referral made. Will follow.     ECOC on chart for MD signature

## 2022-09-26 NOTE — ACP (ADVANCE CARE PLANNING)
Advance Care Planning     General Advance Care Planning (ACP) Conversation    Date of Conversation: 9/23/2022  Conducted with: Patient with Decision Making Capacity    Healthcare Decision Maker:    Primary Decision Maker: Crystal Ann - Spouse - [phone]    Secondary Decision Maker: Merline Justice - Brother/Sister - [phone]  Click here to complete Healthcare Decision Makers including selection of the Healthcare Decision Maker Relationship (ie \"Primary\"). Today we documented Decision Maker(s) consistent with Legal Next of Kin hierarchy. Content/Action Overview:   Has ACP document(s) on file - reflects the patient's care preferences  Reviewed DNR/DNI and patient elects Full Code (Attempt Resuscitation)      Length of Voluntary ACP Conversation in minutes:  <16 minutes (Non-Billable)    David Marsh RN

## 2022-09-26 NOTE — PROGRESS NOTES
09/26/22 0350   NIV Type   Equipment Type v60   Mode Bilevel   Mask Type Full face mask   Mask Size Large   Settings/Measurements   PIP Observed 20 cm H20   IPAP 18 cmH20   CPAP/EPAP 6 cmH2O   Vt (Measured) 522 mL   Rate Ordered 14   Resp 17   FiO2  40 %   I Time/ I Time % 1 s   Minute Volume (L/min) 9 Liters   Mask Leak (lpm) 38 lpm   Comfort Level Good   Using Accessory Muscles No   SpO2 96   Patient's Home Machine No   Alarm Settings   Alarms On Y   Low Pressure (cmH2O) 6 cmH2O   High Pressure  30 cmH2O   Apnea (secs) 20 secs   RR Low (bpm) 6   RR High (bpm) 40 br/min

## 2022-09-26 NOTE — CONSULTS
[facility], [place], operated by [facility]  Interventional cardiology CONSULTATION  [phone]      Attending Physician: Cristy Alvarado MD  Reason for Consultation/Chief Complaint: Shortness of breath    Subjective   History of Present Illness:  Yariel Diaz is a 45 y.o. patient who presented to the hospital with complaints of shortness of breath over the preceding 3 days prior to presentation which was on September 23, 2022. Patient said breathing progressively got worse, he says he had been receiving his dialysis without difficulty, he had not missed any dialysis and came the emergency room. However, he states his last session was cut short due to shortness of breath. His initial weight was documented between 237 to 257 pounds, current weight is 222 pounds. He was here in May through June 2022 for similar issues and weight at that time was 240 pounds. He was felt to be volume overloaded, proBNP level was over 70,000, troponin levels were elevated at 0.14 and 0.15. Chronically, he does have dm/ESRD, is on dialysis and has been compliant with that. He has a cardiac history which dates back to at least 2013 and has been diagnosed with nonischemic cardiomyopathy, he typically follows with Dr. Ezio Becerra in the office. He did have more significant CAD detected in 2015 with LAD PCI at that time. He saw Dr. Ezio Becerra in 2019 in follow-up for preop evaluation for possible kidney transplant he was noted to have significant aortic stenosis, and he was referred to the TAVR team and underwent TAVR in 2019. His last office visit was in February 2022, it was noted he had been hospitalized in January to 20 with shortness of breath and elevated troponin as well as abnormal stress test underwent catheterization and found to have progressive RCA disease treated with drug-eluting stent x1. He is also had a history of peripheral vascular disease, is followed with Dr. Yeimi Mac and has had PTA to the right iliac artery.   His target dry weight has been felt to be around 119 kg. Current weight is 109 kg. Additionally, patient has had paroxysmal atrial fibrillation, has been on Eliquis, has had a history of stroke. At the most recent hospitalization in May and June 2022, he underwent cardiac catheterization and was found to have significant CAD however was felt to be he was best treated medically at that time    Past Medical History:   has a past medical history of Ambulatory dysfunction, Aortic stenosis, Arthritis, Asthma, Bilateral hilar adenopathy syndrome, CAD (coronary artery disease), Cardiomyopathy ([address] CHF (congestive heart failure) ([address] Chronic pain, COPD (chronic obstructive pulmonary disease) ([address] Depression, Diabetes mellitus ([address] Difficult intravenous access, Emphysema of lung ([address] ESRD (end stage renal disease) on dialysis ([address] Fear of needles, Gastric ulcer, GERD (gastroesophageal reflux disease), Heart valve problem, Hemodialysis patient ([address] History of spinal fracture, Hx of blood clots, Hyperlipidemia, Hypertension, MI (myocardial infarction) ([address] Neuromuscular disorder ([address] Numbness and tingling in left arm, Pneumonia, PONV (postoperative nausea and vomiting), Prolonged emergence from general anesthesia, Sleep apnea, Stroke ([address] TIA (transient ischemic attack), and Unspecified diseases of blood and blood-forming organs. Surgical History:   has a past surgical history that includes Tonsillectomy; cyst removal (08/14/2013); Colonoscopy; Coronary angioplasty with stent (05/26/15); vascular surgery (aprx 2 years ago); Colonoscopy (2/29/2015); Upper gastrointestinal endoscopy (01/06/2016); Upper gastrointestinal endoscopy (01/29/2017); other surgical history (02/01/2017); Dialysis fistula creation (Left, 10/30/2017);  Diagnostic Cardiac Cath Lab Procedure; other surgical history (2018); other surgical history (Bilateral, 06/26/2018); pr lap insertion tunneled intraperitoneal catheter (N/A, 9/21/2018); other surgical history (Right, 09/2018); Dialysis fistula creation (Left, 3/27/2019); other surgical history (05/28/2019); Endoscopy, colon, diagnostic; Catheter Removal (Right, 7/2/2019); Aortic valve replacement (N/A, 10/15/2019); Rectal surgery (N/A, 2/24/2022); IR TUNNELED CVC PLACE WO SQ PORT/PUMP > 5 YEARS (3/21/2022); IR TUNNELED CVC PLACE WO SQ PORT/PUMP > 5 YEARS (4/21/2022); IR TUNNELED CVC PLACE WO SQ PORT/PUMP > 5 YEARS (4/26/2022); and IR TUNNELED CVC PLACE WO SQ PORT/PUMP > 5 YEARS (6/23/2022). Social History:   reports that he quit smoking about 2 years ago. His smoking use included cigarettes. He has a 16.50 pack-year smoking history. He has never used smokeless tobacco. He reports that he does not currently use alcohol. He reports that he does not use drugs. Family History:  family history includes Alcohol Abuse in his brother; Cancer in his sister and sister; Diabetes in his mother; Heart Disease in his father, sister, and sister; Kidney Disease in his sister; Obesity in his sister and sister. Home Medications:  Were reviewed and are listed in nursing record and/or below  Prior to Admission medications    Medication Sig Start Date End Date Taking? Authorizing Provider   oxyCODONE-acetaminophen (PERCOCET) 7.5-325 MG per tablet Take 1 tablet by mouth every 6 hours as needed for Pain. Yes Historical Provider, MD   metoprolol succinate (TOPROL XL) 100 MG extended release tablet Take 1 tablet by mouth in the morning and at bedtime 7/21/22   Rj Peck MD   sacubitril-valsartan (ENTRESTO) 24-26 MG per tablet Take 2 tablets by mouth in the morning and 2 tablets before bedtime.  7/21/22   Rj Peck MD   cyclobenzaprine (FLEXERIL) 5 MG tablet Muscle spasms 7/21/22   Rj Peck MD   traZODone (DESYREL) 100 MG tablet Take 100 mg by mouth nightly    Historical Provider, MD   QUEtiapine (SEROQUEL) 50 MG tablet TAKE 1 TABLET BY MOUTH EVERY EVENING 6/30/22   Matty Hernandez MD isosorbide dinitrate (ISORDIL) 20 MG tablet Take 1 tablet by mouth 3 times daily 6/8/22   Chriss Kayser, PA   clopidogrel (PLAVIX) 75 MG tablet Take 1 tablet by mouth daily 5/9/22   Arturo RiserJAY CNP   pantoprazole (PROTONIX) 40 MG tablet TAKE 1 TABLET BY MOUTH EVERY MORNING BEFORE BREAKFAST 4/28/22   Burton Reddy MD   docusate sodium (DOK) 100 MG capsule Take 1 capsule by mouth daily 4/27/22   Ansley Ware MD   LINZESS 145 MCG capsule TAKE 1 CAPSULE BY MOUTH IN THE MORNING BEFORE BREAKFAST 4/27/22   Burton Reddy MD   DULoxetine (CYMBALTA) 30 MG extended release capsule TAKE 1 CAPSULE BY MOUTH EVERY DAY 3/29/22   Burton Reddy MD   mineral oil liquid Take 30 mLs by mouth daily as needed for Constipation 3/9/22   Burton Reddy MD   sennosides-docusate sodium (SENOKOT-S) 8.6-50 MG tablet Take 1 tablet by mouth daily 3/9/22   Burton Reddy MD   apixaban (ELIQUIS) 5 MG TABS tablet Take 1 tablet by mouth 2 times daily 3/3/22   Asya Couch MD   pravastatin (PRAVACHOL) 40 MG tablet Take 1 tablet by mouth daily 2/10/22   Arturo RiserJAY CNP   Continuous Blood Gluc Sensor (DEXCOM G6 SENSOR) MISC Every 10 days 10/5/21   Burton Reddy MD   Continuous Blood Gluc Transmit (DEXCOM G6 TRANSMITTER) MISC 1 each by Does not apply route every 3 months 10/5/21   Burton Reddy MD   Continuous Blood Gluc  (Tiny Magic ) [address] 1 each by Does not apply route Daily with lunch 10/5/21   Burton Reddy MD   B Complex-C-Folic Acid (VIRT-CAPS) 1 MG CAPS TAKE 1 CAPSULE BY MOUTH EVERY DAY 9/20/21   Burton Reddy MD   Calcium Acetate, Phos Binder, 667 MG CAPS TAKE 1 CAPSULE BY MOUTH THREE TIMES DAILY WITH MEALS 8/12/21   Burton Reddy MD   nitroGLYCERIN (NITROSTAT) 0.4 MG SL tablet DISSOLVE 1 TABLET UNDER THE TONGUE AS NEEDED FOR CHEST PAIN EVERY 5 MINUTES UP TO 3 TIMES.  IF NO RELIEF CALL 911. 1/7/21   Burton Reddy MD   vitamin D (ERGOCALCIFEROL) 83958 units CAPS capsule TK 1 C acid (NEPHROCAPS) capsule 1 mg, Daily  sodium chloride flush 0.9 % injection 10 mL, 2 times per day  sodium chloride flush 0.9 % injection 10 mL, PRN  0.9 % sodium chloride infusion, PRN  promethazine (PHENERGAN) tablet 12.5 mg, Q6H PRN  acetaminophen (TYLENOL) tablet 650 mg, Q6H PRN   Or  acetaminophen (TYLENOL) suppository 650 mg, Q6H PRN  bumetanide (BUMEX) injection 2 mg, Q12H  glucose chewable tablet 16 g, PRN  dextrose bolus 10% 125 mL, PRN   Or  dextrose bolus 10% 250 mL, PRN  glucagon (rDNA) injection 1 mg, PRN  dextrose 10 % infusion, Continuous PRN        Allergies:  Morphine     Review of Systems:   A 14 point review of symptoms completed. Pertinent positives identified in the HPI, all other review of symptoms negative as below.       Objective   PHYSICAL EXAM:    Vitals:    09/26/22 0350   BP:    Pulse:    Resp: 17   Temp:    SpO2:     Weight: 222 lb 3.2 oz (100.8 kg)         General Appearance:  Alert, cooperative, no distress, appears stated age, lying flat, able to nearly speak in full sentences   Head:  Normocephalic, without obvious abnormality, atraumatic   Eyes:  PERRL, conjunctiva/corneas clear   Neck: Supple, symmetrical, trachea midline, no adenopathy, thyroid: not enlarged, symmetric, no tenderness/mass/nodules, no  JVD   Lungs:   Clear to auscultation bilaterally, respirations unlabored   Chest Wall:  No deformity or tenderness, there is a dialysis port in the left chest wall   Heart:  Regular rate and rhythm, S1, S2 normal, 2/6 sm    Abdomen:   Soft, non-tender, bowel sounds active all four quadrants,  no masses, no organomegaly   Extremities: Extremities +1 bilateral lower extremity edema   Pulses: 2+ and symmetric   Skin: Skin color, texture, turgor normal, no rashes or lesions   Pysch: Normal mood and affect   Neurologic: Normal gross motor and sensory exam.         Labs   CBC:   Lab Results   Component Value Date/Time    WBC 7.0 09/25/2022 04:52 AM    RBC 3.61 09/25/2022 04:52 AM    HGB 10.4 09/25/2022 04:52 AM    HCT 31.9 09/25/2022 04:52 AM    MCV 88.3 09/25/2022 04:52 AM    RDW 15.8 09/25/2022 04:52 AM     09/25/2022 04:52 AM     CMP:  Lab Results   Component Value Date/Time     09/26/2022 04:51 AM    K 5.0 09/26/2022 04:51 AM    CL 93 09/26/2022 04:51 AM    CO2 21 09/26/2022 04:51 AM    BUN 63 09/26/2022 04:51 AM    CREATININE 7.5 09/26/2022 04:51 AM    GFRAA 9 09/26/2022 04:51 AM    GFRAA >60 05/17/2013 06:50 AM    AGRATIO 1.2 09/25/2022 04:52 AM    LABGLOM 8 09/26/2022 04:51 AM    GLUCOSE 146 09/26/2022 04:51 AM    PROT 6.4 09/25/2022 04:52 AM    PROT 6.7 12/27/2012 04:18 AM    CALCIUM 8.0 09/26/2022 04:51 AM    BILITOT <0.2 09/25/2022 04:52 AM    ALKPHOS 77 09/25/2022 04:52 AM    AST 11 09/25/2022 04:52 AM    ALT 9 09/25/2022 04:52 AM     PT/INR:  No results found for: PTINR  HgBA1c:  Lab Results   Component Value Date    LABA1C 7.2 09/24/2022     Lab Results   Component Value Date    CKTOTAL 45 05/25/2022    TROPONINI 0.15 (H) 09/24/2022         Cardiac Data     Last EKG: Sinus tachycardia, poor R wave progression    Echo:    5/2022    Summary   Definity® used for myocardial border enhancement. Left ventricular systolic function is severely reduced with a visually   estimated ejection fraction of 20-25 %. EF estimated by Jasmine's method at 24 %. The left ventricle is mildly dilated in size with normal wall thickness. Severe global hypokinesis with regional variation. Grade I diastolic dysfunction with normal LV pressure. There is no evidence of a left ventricular thrombus. Right ventricular systolic function is reduced. A 29 mm Langford Leyda S3 bioprosthetic aortic valve appears well seated   with normal Doppler values. Inadequate tricuspid valve regurgitation to estimate systolic pulmonary   artery pressure. There is a moderate left pleural effusion noted.        Stress Test:    5/2022        Summary    Severe LV dysfunction EF 26%    Global hypokinesis with regional variation, more pronounced in inferolateral    wall and apex    Large mainly fixed defect in inferior wall, extends to portions of lateral    wall and apex with moderate darrick-infarct ischemia        Overall, this would be considered an abnormal, higher risk, study           Cath:    2022    CARDIAC CATHETERIZATION REPORT      Procedure Date:  2022  Patient Name: Fadi Meza  MRN: [id number]      : 1970        INDICATION      High risk abnormal stress test  Ischemic or mildly  Non-STEMI     PROCEDURES PERFORMED      Right heart catheterization  Left heart catheterization  LVgram  Coronary angiogam  Coronary cath  Monitoring of moderate conscious sedation              PROCEDURE DESCRIPTION   Risks/benefits/alternatives/outcomes were discussed with patient and/or family and informed consent was obtained. Using the [facility] scale, the patient's right radial artery was found to be a level B. Patient was prepped draped in the usual sterile fashion. Local anaesthetic was applied over puncture site. Using ultrasound guidance and a front wall technique, a 4/5 Scottish Terumo sheath was inserted into right radial artery. Verapamil, nitroglycerin, nicardipine were administered through the sheath. Heparin was administered. Diagnostic 5 [place] pigtail, TIG, Binu catheters were used for diagnostic angiograms. At the conclusion of the procedure, a TR band was placed over the puncture site and hemostasis was obtained. There were no immediate complications. I supervised sedation from 9:53 AM to 11:08 AM with versed 5 mg/fentanyl 125 mcg during the procedure. An independent trained observer pushed meds at my direction. We monitored the patient's level of consciousness and vital signs/physiologic status throughout the procedure duration (see times listed previously). 205 cc contrast was utilized. <20cc EBL. Case/findings/plans discussed with patient's wife, she understood/agreed. FINDINGS      HEMODYNAMICS     CHAMBER PRESSURE SATURATION   RA  10 60%   RV  47/9     PA  50/20  67%   PW  19     AORTA  101/50  95%      NANCY CARDIAC OUTPUT  5.8 L/min   SVR  756    PVR  234             LVGRAM     LVEDP  21   GRADIENT ACROSS AORTIC VALVE  less than 5 mmHg   LV FUNCTION EF 20%   WALL MOTION  severe global hypokinesis with regional variation   MITRAL REGURGITATION  mild         CORONARY ARTERIES     LM Less than 10% [id number] stenosis            LAD Moderately calcified, 20 to 30% proximal-mid stenosis, distal vessel tapers at the apex with 60% diffuse disease. D1 has an ostial/proximal 75 to 80% stenosis. LCX  Calcified, proximal-mid 75 to 80% stenosis. Distal vessel is tortuous with 70 to 85% diffuse stenosis with more discrete stenosis noted distally. OM 1 is a very small vessel with ostial/proximal 80% stenosis and 60 to 70% diffuse disease in the uccosbrj-tby-uuqbvx vessel. Les Pimple RCA Dominant, calcified, tortuous, there is a proximal-mid stent with 60 to 70% in-stent restenosis. Distal vessel 20 to 30% stenosis               CONCLUSIONS:      Mild to moderately increased filling pressures  Patent RCA stent with moderate to borderline severe in-stent restenosis  Severe circumflex and D1 stenosis  Continue medical management, consider outpatient high risk PCI of above territories pending outpatient clinical follow-up. Currently medical management seems best given comorbidities and need for additional fluid removal.  If PCI is pursued, will likely need general anesthesia. Patient had difficulty lying still on Cath Lab table. Okay to resume Eliquis tomorrow, case/plan/findings discussed with patient and wife, they understand/agree, they stated it is incorrectly stated in chart the patient would refuse blood, he is okay to receive blood products if needed.   Continue beta-blocker and Entresto     No further inpatient cardiac work-up or treatment plan, will sign off, please call questions          Studies:     Cxr    Impression   Moderate left pleural effusion with associated left lower lobe airspace   opacities. I have reviewed labs and imaging/xray/diagnostic testing in this note. Assessment and Plan         Patient Active Problem List   Diagnosis    Sepsis without acute organ dysfunction ([address]    CHF (congestive heart failure) (HCC)    Asthma-COPD overlap syndrome ([address]    CAD (coronary artery disease)    PVD (peripheral vascular disease) ([address]    Bicuspid aortic valve    Bilateral hilar adenopathy syndrome    Claudication in peripheral vascular disease ([address]    Uncontrolled hypertension    Diabetic neuropathy (HCC)    Type 2 diabetes, uncontrolled, with neuropathy ([address]    Passive smoke exposure    Depression with anxiety    PNA (pneumonia)    Obesity (BMI 30-39. 9)    ZAINAB on CPAP    Degeneration of lumbar or lumbosacral intervertebral disc    Lumbar radiculopathy    Lumbosacral spondylosis without myelopathy    Biliary colic    Symptomatic cholelithiasis    Gastroparesis due to DM ([facility])    Angina, class IV ([facility])    Dyspnea    Dyslipidemia    Acute on chronic combined systolic and diastolic heart failure ([facility])    Ischemic cardiomyopathy    Tobacco abuse    CVA (cerebral vascular accident) ([address]    History of CVA (cerebrovascular accident)    Type 2 diabetes mellitus without complication, without long-term current use of insulin ([facility])    ZAINAB (obstructive sleep apnea)    Pleural effusion on left    Chronic anemia    Nonrheumatic aortic valve stenosis    Mucus plugging of bronchi    Hemodialysis-associated hypotension    ESRD (end stage renal disease) on dialysis ([facility])    Hypotension due to drugs    Acute diastolic CHF (congestive heart failure) ([facility])    Neuromuscular disorder ([facility])    Renovascular hypertension    Mixed hyperlipidemia    Cigarette nicotine dependence in remission    Pulmonary edema    Fluid overload  Anemia of chronic disease    SOB (shortness of breath) on exertion    Steal syndrome of dialysis vascular access ([facility])    Chronic, continuous use of opioids    Chronic bronchitis ([facility])    Nasal congestion    Hypercholesteremia    Bradycardia    History of transcatheter aortic valve replacement (TAVR)    Syncope and collapse    PAF (paroxysmal atrial fibrillation) ([facility])    Bilateral leg weakness    GBS (Guillain-[place] syndrome) ([facility])    Sinus pause    Weakness of both lower extremities    Sinus bradycardia    Ataxia    Peripheral vascular occlusive disease ([facility])    Cellulitis of right foot    Iliac artery occlusion, right ([facility])    Cellulitis and abscess of hand    Type 2 diabetes mellitus with hyperglycemia ([facility])    Acute encephalopathy    Acute hypoxemic respiratory failure ([facility])    Acute CVA (cerebrovascular accident) ([address]    Speech problem    Urinary tract infection with hematuria    Respiratory failure with hypoxia ([facility])    Acute respiratory failure with hypercapnia ([facility])    Acute pulmonary edema ([facility])    Grade II diastolic dysfunction    Shock circulatory ([facility])    Smoker    Normocytic normochromic anemia    NSTEMI (non-ST elevated myocardial infarction) ([facility])    SIRS (systemic inflammatory response syndrome) ([facility])    Atrial flutter ([facility])    [place]-rectal abscess    Somnolence    Pulmonary edema with diastolic CHF, NYHA class 3 ([facility])    Respiratory failure ([address]    Former smoker    Cardiomyopathy ([address]    Morbid obesity with BMI of 40.0-44.9, adult ([address]    Hypoglycemia    Altered mental status    Hypertensive emergency    SOB (shortness of breath)    Acute respiratory failure with hypoxia and hypercapnia ([facility])    HFrEF (heart failure with reduced ejection fraction) ([facility])       Shortness of breath, non-STEMI, plan on follow-up limited echocardiogram for LV function, consider follow-up cardiac agitation and high risk PCI pending recovery from acute issues. CAD/ASHD, continue Plavix    Volume overload, fluid management as per nephrology/dialysis    Hypertension, continue beta-blocker    Paroxysmal atrial fibrillation, continue Eliquis    Status post TAVR, follow-up echocardiogram as above    Diabetes, as per primary service    Sleep apnea on CPAP, respiratory failure, as per primary service        Thank you for allowing us to participate in the care of Ardyce Hodgkins. Please call me with any questions [phone].     Fabi Olson MD, [facility] [place]   Interventional Cardiologist  [facility]  [phone] [facility]  [phone] [address] [place] [address]  9/26/2022 8:37 AM

## 2022-09-26 NOTE — PROGRESS NOTES
09/25/22 2130   NIV Type   Skin Assessment Clean, dry, & intact   Skin Protection for O2 Device No  (pt refused mepilex)   NIV Started/Stopped On   Equipment Type v60   Mode Bilevel   Mask Type Full face mask   Mask Size Large   Settings/Measurements   PIP Observed 19 cm H20   IPAP 18 cmH20   CPAP/EPAP 6 cmH2O   Vt (Measured) 620 mL   Rate Ordered 14   Resp 14   FiO2  40 %   I Time/ I Time % 1 s   Minute Volume (L/min) 10 Liters   Comfort Level Good   Using Accessory Muscles No   SpO2 97   Patient's Home Machine No   Alarm Settings   Alarms On Y   Low Pressure (cmH2O) 6 cmH2O   High Pressure  30 cmH2O   Apnea (secs) 20 secs   RR Low (bpm) 6   RR High (bpm) 40 br/min

## 2022-09-26 NOTE — PROGRESS NOTES
Patient's EF (Ejection Fraction) is less than 40%    Heart Failure Medications:  Diuretics[de-identified]  Bumex    (One of the following REQUIRED for EF </= 40%/SYSTOLIC FAILURE but MAY be used in EF% >40%/DIASTOLIC FAILURE)        ACE[de-identified] None        ARB[de-identified] None         ARNI[de-identified] Sacubitril/Valsartan-Entresto    (Beta Blockers)  NON- Evidenced Based Beta Blocker (for EF% >40%/DIASTOLIC FAILURE): Metoprolol TARTrate- Lopressor    Evidenced Based Beta Blocker::(REQUIRED for EF% <40%/SYSTOLIC FAILURE) Metoprolol SUCCinate- Toprol XL  . .................................................................................................................................................. Patient's weights and intake/output reviewed: Yes    Patient's Last Weight: 100.8 kg obtained by bed scale. Admission weight.     Intake/Output Summary (Last 24 hours) at 9/26/2022 1154  Last data filed at 9/26/2022 1137  Gross per 24 hour   Intake 760 ml   Output 100 ml   Net 660 ml       Education Booklet Provided: yes    Comorbidities Reviewed Yes    Patient has a past medical history of Ambulatory dysfunction, Aortic stenosis, Arthritis, Asthma, Bilateral hilar adenopathy syndrome, CAD (coronary artery disease), Cardiomyopathy ([address] CHF (congestive heart failure) ([address] Chronic pain, COPD (chronic obstructive pulmonary disease) ([address] Depression, Diabetes mellitus ([address] Difficult intravenous access, Emphysema of lung ([address] ESRD (end stage renal disease) on dialysis ([address] Fear of needles, Gastric ulcer, GERD (gastroesophageal reflux disease), Heart valve problem, Hemod[facility] patient ([facility] History of spinal fracture, Hx of blood clots, Hyperlipidemia, Hypertension, MI (myocardial infarction) ([facility] Neuromuscular disorder ([facility] Numbness and tingling in left arm, Pneumonia, PONV (postoperative nausea and vomiting), Prolonged emergence from general anesthesia, Sleep apnea, Stroke ([facility] TIA (transient ischemic attack), and Unspecified diseases of blood

## 2022-09-27 ENCOUNTER — APPOINTMENT (OUTPATIENT)
Dept: GENERAL RADIOLOGY | Age: 52
DRG: 291 | End: 2022-09-27
Payer: MEDICARE

## 2022-09-27 LAB
ANION GAP SERPL CALCULATED.3IONS-SCNC: 17 MMOL/L (ref 3–16)
BUN BLDV-MCNC: 47 MG/DL (ref 7–20)
CALCIUM SERPL-MCNC: 8.2 MG/DL (ref 8.3–10.6)
CHLORIDE BLD-SCNC: 92 MMOL/L (ref 99–110)
CO2: 20 MMOL/L (ref 21–32)
CREAT SERPL-MCNC: 6.5 MG/DL (ref 0.9–1.3)
GFR AFRICAN AMERICAN: 11
GFR NON-AFRICAN AMERICAN: 9
GLUCOSE BLD-MCNC: 130 MG/DL (ref 70–99)
GLUCOSE BLD-MCNC: 131 MG/DL (ref 70–99)
GLUCOSE BLD-MCNC: 142 MG/DL (ref 70–99)
GLUCOSE BLD-MCNC: 186 MG/DL (ref 70–99)
GLUCOSE BLD-MCNC: 196 MG/DL (ref 70–99)
HCT VFR BLD CALC: 32.7 % (ref 40.5–52.5)
HEMOGLOBIN: 10.7 G/DL (ref 13.5–17.5)
MCH RBC QN AUTO: 29.3 PG (ref 26–34)
MCHC RBC AUTO-ENTMCNC: 32.8 G/DL (ref 31–36)
MCV RBC AUTO: 89.4 FL (ref 80–100)
PDW BLD-RTO: 15.4 % (ref 12.4–15.4)
PERFORMED ON: ABNORMAL
PLATELET # BLD: 233 K/UL (ref 135–450)
PMV BLD AUTO: 8.2 FL (ref 5–10.5)
POTASSIUM REFLEX MAGNESIUM: 4.7 MMOL/L (ref 3.5–5.1)
RBC # BLD: 3.66 M/UL (ref 4.2–5.9)
SODIUM BLD-SCNC: 129 MMOL/L (ref 136–145)
WBC # BLD: 5.8 K/UL (ref 4–11)

## 2022-09-27 PROCEDURE — 94640 AIRWAY INHALATION TREATMENT: CPT

## 2022-09-27 PROCEDURE — 6370000000 HC RX 637 (ALT 250 FOR IP): Performed by: INTERNAL MEDICINE

## 2022-09-27 PROCEDURE — 80048 BASIC METABOLIC PNL TOTAL CA: CPT

## 2022-09-27 PROCEDURE — 36415 COLL VENOUS BLD VENIPUNCTURE: CPT

## 2022-09-27 PROCEDURE — 6370000000 HC RX 637 (ALT 250 FOR IP): Performed by: NURSE PRACTITIONER

## 2022-09-27 PROCEDURE — 94761 N-INVAS EAR/PLS OXIMETRY MLT: CPT

## 2022-09-27 PROCEDURE — 85027 COMPLETE CBC AUTOMATED: CPT

## 2022-09-27 PROCEDURE — 2580000003 HC RX 258: Performed by: INTERNAL MEDICINE

## 2022-09-27 PROCEDURE — 2700000000 HC OXYGEN THERAPY PER DAY

## 2022-09-27 PROCEDURE — 90935 HEMODIALYSIS ONE EVALUATION: CPT

## 2022-09-27 PROCEDURE — 94660 CPAP INITIATION&MGMT: CPT

## 2022-09-27 PROCEDURE — 99233 SBSQ HOSP IP/OBS HIGH 50: CPT | Performed by: NURSE PRACTITIONER

## 2022-09-27 PROCEDURE — 71046 X-RAY EXAM CHEST 2 VIEWS: CPT

## 2022-09-27 PROCEDURE — 2060000000 HC ICU INTERMEDIATE R&B

## 2022-09-27 RX ADMIN — SACUBITRIL AND VALSARTAN 1 TABLET: 24; 26 TABLET, FILM COATED ORAL at 21:09

## 2022-09-27 RX ADMIN — PANTOPRAZOLE SODIUM 40 MG: 40 TABLET, DELAYED RELEASE ORAL at 06:40

## 2022-09-27 RX ADMIN — OXYCODONE AND ACETAMINOPHEN 1 TABLET: 7.5; 325 TABLET ORAL at 08:11

## 2022-09-27 RX ADMIN — TIOTROPIUM BROMIDE AND OLODATEROL 2 PUFF: 3.124; 2.736 SPRAY, METERED RESPIRATORY (INHALATION) at 08:19

## 2022-09-27 RX ADMIN — SODIUM CHLORIDE, PRESERVATIVE FREE 10 ML: 5 INJECTION INTRAVENOUS at 08:12

## 2022-09-27 RX ADMIN — DOCUSATE SODIUM 100 MG: 100 CAPSULE, LIQUID FILLED ORAL at 08:11

## 2022-09-27 RX ADMIN — SACUBITRIL AND VALSARTAN 1 TABLET: 24; 26 TABLET, FILM COATED ORAL at 08:11

## 2022-09-27 RX ADMIN — ISOSORBIDE DINITRATE 20 MG: 20 TABLET ORAL at 21:09

## 2022-09-27 RX ADMIN — PRAVASTATIN SODIUM 40 MG: 40 TABLET ORAL at 08:11

## 2022-09-27 RX ADMIN — METOPROLOL SUCCINATE 100 MG: 50 TABLET, EXTENDED RELEASE ORAL at 21:09

## 2022-09-27 RX ADMIN — OXYCODONE AND ACETAMINOPHEN 1 TABLET: 7.5; 325 TABLET ORAL at 14:23

## 2022-09-27 RX ADMIN — OXYCODONE AND ACETAMINOPHEN 1 TABLET: 7.5; 325 TABLET ORAL at 21:07

## 2022-09-27 RX ADMIN — ISOSORBIDE DINITRATE 20 MG: 20 TABLET ORAL at 14:23

## 2022-09-27 RX ADMIN — QUETIAPINE FUMARATE 50 MG: 25 TABLET ORAL at 21:09

## 2022-09-27 RX ADMIN — METOPROLOL SUCCINATE 100 MG: 50 TABLET, EXTENDED RELEASE ORAL at 08:11

## 2022-09-27 RX ADMIN — NEPHROCAP 1 MG: 1 CAP ORAL at 08:11

## 2022-09-27 RX ADMIN — DULOXETINE HYDROCHLORIDE 30 MG: 30 CAPSULE, DELAYED RELEASE ORAL at 08:11

## 2022-09-27 RX ADMIN — ISOSORBIDE DINITRATE 20 MG: 20 TABLET ORAL at 08:11

## 2022-09-27 RX ADMIN — CLOPIDOGREL BISULFATE 75 MG: 75 TABLET ORAL at 08:11

## 2022-09-27 ASSESSMENT — PAIN DESCRIPTION - ORIENTATION
ORIENTATION: LOWER
ORIENTATION: LOWER

## 2022-09-27 ASSESSMENT — PAIN SCALES - GENERAL
PAINLEVEL_OUTOF10: 8
PAINLEVEL_OUTOF10: 10

## 2022-09-27 ASSESSMENT — PAIN DESCRIPTION - LOCATION
LOCATION: GENERALIZED
LOCATION: BACK
LOCATION: BACK

## 2022-09-27 NOTE — PROGRESS NOTES
[facility] Progress Note           Subjective/   45y.o. year old male who we are seeing in consultation for ESKD on HD. HPI:  Last HD on 9/26 with 3.2 liters removed, post-weight of 111.3 kg. States shortness of breath persists, did not improve with HD, remains on 3 L NC.    ROS:  No chest pain, intake adequate. Objective/   GEN:  Chronically ill, /76   Pulse 66   Temp (!) 96.4 °F (35.8 °C)   Resp 20   Ht 5' 9\" (1.753 m)   Wt 235 lb 0.2 oz (106.6 kg)   SpO2 94%   BMI 34.71 kg/m²   HEENT: non-icteric, no JVD  CV: S1, S2 without m/r/g; no LE edema  RESP: CTA B with bilateral wheezing w/o r/r; breathing wnl  ABD: +bs, soft, nt, no hsm  SKIN: warm, no rashes  ACCESS: Left IJ [facility]    Data/  Recent Labs     09/25/22  0452 09/27/22  0509   WBC 7.0 5.8   HGB 10.4* 10.7*   HCT 31.9* 32.7*   MCV 88.3 89.4    233       Recent Labs     09/25/22  0452 09/26/22  0451 09/27/22  0509   * 132* 129*   K 4.9 5.0 4.7   CL 95* 93* 92*   CO2 22 21 20*   GLUCOSE 105* 146* 130*   BUN 45* 63* 47*   CREATININE 6.2* 7.5* 6.5*   LABGLOM 9* 8* 9*   GFRAA 11* 9* 11*         Assessment/     - End stage kidney disease - on HD Mon-Wed-Fri    - NSTEMI    - Hypertension    - s/p TAVR    - Left pleural effusion - moderate    - COPD/ZAINAB - requiring bi-level    - Anemia of chronic disease - DAVON with HD      Plan/     - HD per [facility] schedule - UF as tolerated with crit line monitoring to target pleural effusion, may need thoracentesis, outpatient .5 kg  - Resumed DAVON with HD   - Trend labs, bp's, chest x-ray    ____________________________________  Karlie Vaughn MD  [facility]  [email or web address]  Office: [phone]

## 2022-09-27 NOTE — PROGRESS NOTES
Hospitalist Progress Note      PCP: Augustin Dominguez MD    Date of Admission: 9/23/2022    Chief Complaint: SOB    Subjective: no new c/o. Medications:  Reviewed    Infusion Medications    sodium chloride      dextrose       Scheduled Medications    epoetin siddharth-epbx  5,400 Units IntraVENous Q MWF    sacubitril-valsartan  1 tablet Oral BID    insulin lispro  0-4 Units SubCUTAneous TID WC    insulin lispro  0-4 Units SubCUTAneous Nightly    [Held by provider] apixaban  5 mg Oral BID    clopidogrel  75 mg Oral Daily    DULoxetine  30 mg Oral Daily    linaclotide  145 mcg Oral QAM AC    isosorbide dinitrate  20 mg Oral TID    pantoprazole  40 mg Oral QAM AC    pravastatin  40 mg Oral Daily    tiotropium-olodaterol  2 puff Inhalation Daily    QUEtiapine  50 mg Oral Nightly    metoprolol succinate  100 mg Oral BID    docusate sodium  100 mg Oral Daily    b complex-C-folic acid  1 capsule Oral Daily    sodium chloride flush  10 mL IntraVENous 2 times per day     PRN Meds: oxyCODONE-acetaminophen, heparin (porcine), albuterol sulfate HFA, cyclobenzaprine, ipratropium-albuterol, sodium chloride flush, sodium chloride, promethazine, acetaminophen **OR** acetaminophen, glucose, dextrose bolus **OR** dextrose bolus, glucagon (rDNA), dextrose      Intake/Output Summary (Last 24 hours) at 9/27/2022 0939  Last data filed at 9/26/2022 1137  Gross per 24 hour   Intake --   Output 50 ml   Net -50 ml         Physical Exam Performed:    /76   Pulse 66   Temp (!) 96.4 °F (35.8 °C)   Resp 16   Ht 5' 9\" (1.753 m)   Wt 235 lb 0.2 oz (106.6 kg)   SpO2 94%   BMI 34.71 kg/m²     General appearance: No apparent distress, appears stated age and cooperative. HEENT: Pupils equal, round, and reactive to light. Conjunctivae/corneas clear. Neck: Supple, with full range of motion. No jugular venous distention. Trachea midline. Respiratory:  Normal respiratory effort.  Clear to auscultation, bilaterally without Rales/Wheezes/Rhonchi. Cardiovascular: Regular rate and rhythm with normal S1/S2 without murmurs, rubs or gallops. Abdomen: Soft, non-tender, non-distended with normal bowel sounds. Musculoskeletal: No clubbing, cyanosis or edema bilaterally. Full range of motion without deformity. Skin: Skin color, texture, turgor normal.  No rashes or lesions. Neurologic:  Neurovascularly intact without any focal sensory/motor deficits. Cranial nerves: II-XII intact, grossly non-focal.  Psychiatric: Alert and oriented, thought content appropriate, normal insight  Capillary Refill: Brisk,< 3 seconds   Peripheral Pulses: +2 palpable, equal bilaterally       Labs:   Recent Labs     09/25/22  0452 09/27/22  0509   WBC 7.0 5.8   HGB 10.4* 10.7*   HCT 31.9* 32.7*    233       Recent Labs     09/25/22  0452 09/26/22  0451 09/27/22  0509   * 132* 129*   K 4.9 5.0 4.7   CL 95* 93* 92*   CO2 22 21 20*   BUN 45* 63* 47*   CREATININE 6.2* 7.5* 6.5*   CALCIUM 8.6 8.0* 8.2*       Recent Labs     09/25/22  0452   AST 11*   ALT 9*   BILITOT <0.2   ALKPHOS 77       No results for input(s): INR in the last 72 hours. No results for input(s): Shay Eduardo in the last 72 hours.       Urinalysis:      Lab Results   Component Value Date/Time    NITRU Negative 05/29/2022 12:51 PM    [address]20 05/29/2022 12:51 PM    BACTERIA 3+ 05/29/2022 12:51 PM    RBCUA 5-10 05/29/2022 12:51 PM    BLOODU TRACE-INTACT 05/29/2022 12:51 PM    SPECGRAV 1.015 05/29/2022 12:51 PM    GLUCOSEU 100 05/29/2022 12:51 PM       Consults:    IP CONSULT TO HOSPITALIST  IP CONSULT TO HEART FAILURE NURSE/COORDINATOR  IP CONSULT TO DIETITIAN  IP CONSULT TO NEPHROLOGY  IP CONSULT TO CARDIOLOGY  IP CONSULT TO PALLIATIVE CARE      Assessment/Plan:    Active Hospital Problems    Diagnosis     CAD (coronary artery disease) [I25.10]      Priority: High    [facility] [I50.9]      Priority: High    Acute respiratory failure with hypoxia and [facility]capnia [facility] [J96.01, J96.02]      Priority: Medium    Uncontrolled hypertension [I10]      Priority: Medium    PAF (paroxysmal atrial fibrillation) (HCC) [I48.0]     ESRD (end stage renal disease) on dialysis ([facility] [N18.6, Z99.2]     Pleural effusion on left [J90]     ZAINAB on CPAP [G47.33, Z99.89]     Obesity (BMI 30-39. 9) [E66.9]          CHF - acute on chronic combined sytolic/diastolic failure w/ reduced EF 20-25% by Echo June 2022. Likely due to hypertensive and ischemic heart disease. Continue current medical management and follow I/O as well as clinical response. W/ known CardioRenal syndrome. Cardiology consulted and appreciated. Nephrology consulted and appreciated. Acute Respiratory Failure - w/ hypoxia, POArrival, likely 2nd to above. Presence of clinical respiratory distress w/ tachypnea/dyspnea/SOB and wheezing w/ use of accessory muscles to breath. Supplemental O2 and wean as tolerated. ESRD - on HD M/W/F. Nephrology consulted and appreciated. HTN/CAD - w/ known CAD but no evidence of active signs/sxs of ischemia/failure. Currently controlled on home meds w/ vitals reviewed and documented as above. Afib - chronic paroxysmal of unspecified and clinically unable to determine etiology. Normally rate controlled on BBlocker - continued. Anticoagulated at baseline on home Eliquis due to secondary hypercoaguable state due to Afib - currently held. Monitored on tele. Left Pleural effusion: If not improving with fluid removal, can consider Thoracentesis. Diabetes Mellitus, Type 2: Controlled. Continue SSI. Monitor blood sugar and adjust regimen as needed. Diabetic (Carb-controlled) diet when able. Anxiety/Depression - controlled on home medications - continued. Obesity -  With Body mass index is 34.71 kg/m². Complicating assessment and treatment.  Placing patient at risk for multiple co-morbidities as well as early death and contributing to the patient's presentation. Counseled on weight loss. ZAINAB - likely 2nd to obesity. Controlled on home CPAP/BiPap - continued, w/ outpatient f/u as previously arranged. DVT Prophylaxis: Eliquis - currently held     Recent Labs     09/25/22  0452 09/27/22  0509    233       Diet: ADULT DIET; Regular  Code Status: Full Code      PT/OT Eval Status: not yet ordered. Dispo - Patient is likely to remain in-house at least until Wed/Thurs 28/29 Sept pending clinical course and subspecialty recs.        Jesse Obrien MD

## 2022-09-27 NOTE — PROGRESS NOTES
09/26/22 1747   NIV Type   Skin Protection for O2 Device No  (pt does not want mepilex)   NIV Started/Stopped On   Equipment Type v60   Mode Bilevel   Mask Type Full face mask   Mask Size Large   Settings/Measurements   PIP Observed 20 cm H20   IPAP 18 cmH20   CPAP/EPAP 6 cmH2O   Vt (Measured) 399 mL   Rate Ordered 14   Resp 15   FiO2  40 %   I Time/ I Time % 1 s   Minute Volume (L/min) 6.7 Liters   Comfort Level Good   Using Accessory Muscles No   SpO2 98   Patient's Home Machine No   Alarm Settings   Alarms On Y   Low Pressure (cmH2O) 6 cmH2O   High Pressure (cmH2O) 30 cmH2O   Apnea (secs) 20 secs   RR Low (bpm) 6   RR High (bpm) 40 br/min

## 2022-09-27 NOTE — PROGRESS NOTES
09/27/22 0336   NIV Type   Equipment Type v60   Mode Bilevel   Mask Type Full face mask   Mask Size Large   Settings/Measurements   PIP Observed 20 cm H20   IPAP 18 cmH20   CPAP/EPAP 6 cmH2O   Vt (Measured) 632 mL   Rate Ordered 14   Resp 17   FiO2  40 %   I Time/ I Time % 1 s   Minute Volume (L/min) 10.9 Liters   Mask Leak (lpm) 33 lpm   Comfort Level Good   Using Accessory Muscles No   SpO2 97   Patient's Home Machine No   Alarm Settings   Alarms On Y   Low Pressure (cmH2O) 6 cmH2O   High Pressure (cmH2O) 30 cmH2O   Apnea (secs) 20 secs   RR Low (bpm) 6   RR High (bpm) 40 br/min

## 2022-09-27 NOTE — PROGRESS NOTES
Messaged RT to come check out BIPAP machine, patient states that the \"oxygen isn't flowing like it should\".

## 2022-09-27 NOTE — PROGRESS NOTES
[facility]   Daily Progress Note    Admit Date:  9/23/2022  HPI:    Chief Complaint   Patient presents with    Respiratory Distress     Squad reporting pt called c/o SOB x 3 days. Only received 45min of dialysis today due to feeling SOB. Squad reports pt was tripoding upon their arrival with a RA sat of 96%. They report patient was not moving any air. 1 duoneb and 1 albuterol given in route to the ED along with BIPAP. Interval history: Ardyce Hodgkins is being followed for CHF, shortness of breath, elevated troponin. Subjective:  Mr. Deidre Owen still having shortness of breath despite HD session yesterday.      Objective:   BP 97/68   Pulse 66   Temp 97 °F (36.1 °C) (Axillary)   Resp 20   Ht 5' 9\" (1.753 m)   Wt 235 lb 0.2 oz (106.6 kg)   SpO2 99%   BMI 34.71 kg/m²     Intake/Output Summary (Last 24 hours) at 9/27/2022 1359  Last data filed at 9/27/2022 1112  Gross per 24 hour   Intake 480 ml   Output --   Net 480 ml       NYHA: IV    Physical Exam:  General:  Awake, alert, NAD, appears older than stated age,   Skin:  Warm and dry  Neck:  JVD difficult to assess   Chest:  few wheezing/ dim left base   Telemetry: not currently on tele   Cardiovascular:  RRR S1S2, no m/r/g   Abdomen:  Soft, nontender, +bowel sounds  Extremities:  no  bilateral lower extremity edema    Medications:    epoetin siddharth-epbx  5,400 Units IntraVENous Q MWF    sacubitril-valsartan  1 tablet Oral BID    insulin lispro  0-4 Units SubCUTAneous TID WC    insulin lispro  0-4 Units SubCUTAneous Nightly    [Held by provider] apixaban  5 mg Oral BID    clopidogrel  75 mg Oral Daily    DULoxetine  30 mg Oral Daily    linaclotide  145 mcg Oral QAM AC    isosorbide dinitrate  20 mg Oral TID    pantoprazole  40 mg Oral QAM AC    pravastatin  40 mg Oral Daily    tiotropium-olodaterol  2 puff Inhalation Daily    QUEtiapine  50 mg Oral Nightly    metoprolol succinate  100 mg Oral BID    docusate sodium  100 mg Oral Daily    b complex-C-folic acid  1 capsule Oral Daily    sodium chloride flush  10 mL IntraVENous 2 times per day      sodium chloride      dextrose         Lab Data:  CBC:   Recent Labs     22  0452 22  0509   WBC 7.0 5.8   HGB 10.4* 10.7*    233     BMP:    Recent Labs     22  0452 22  0451 22  0509   * 132* 129*   K 4.9 5.0 4.7   CO2 22 21 20*   BUN 45* 63* 47*   CREATININE 6.2* 7.5* 6.5*     INR:  No results for input(s): INR in the last 72 hours. BNP:    Recent Labs     22   PROBNP >70,000*     Lab Results   Component Value Date    LVEF 20 2022    LVEFMODE Echo 10/16/2019       Testing:    Procedure Date:  2022  Patient Name: Wu Reyes  MRN: [id number]      : 1970        INDICATION      High risk abnormal stress test  Ischemic or mildly  Non-STEMI     PROCEDURES PERFORMED      Right heart catheterization  Left heart catheterization  LVgram  Coronary angiogam  Coronary cath  Monitoring of moderate conscious sedation              PROCEDURE DESCRIPTION   Risks/benefits/alternatives/outcomes were discussed with patient and/or family and informed consent was obtained. Using the [facility] scale, the patient's right radial artery was found to be a level B. Patient was prepped draped in the usual sterile fashion. Local anaesthetic was applied over puncture site. Using ultrasound guidance and a front wall technique, a 4/5 [place] Terumo sheath was inserted into right radial artery. Verapamil, nitroglycerin, nicardipine were administered through the sheath. Heparin was administered. Diagnostic 5 [place] pigtail, TIG, Binu catheters were used for diagnostic angiograms. At the conclusion of the procedure, a TR band was placed over the puncture site and hemostasis was obtained. There were no immediate complications. I supervised sedation from 9:53 AM to 11:08 AM with versed 5 mg/fentanyl 125 mcg during the procedure.  An independent trained observer pushed meds at my direction. We monitored the patient's level of consciousness and vital signs/physiologic status throughout the procedure duration (see times listed previously). 205 cc contrast was utilized. <20cc EBL. Case/findings/plans discussed with patient's wife, she understood/agreed. FINDINGS      HEMODYNAMICS     CHAMBER PRESSURE SATURATION   RA  10 60%   RV  47/9     PA  50/20  67%   PW  19     AORTA  101/50  95%      NANCY CARDIAC OUTPUT  5.8 L/min   SVR  756    PVR  234             LVGRAM     LVEDP  21   GRADIENT ACROSS AORTIC VALVE  less than 5 mmHg   LV FUNCTION EF 20%   WALL MOTION  severe global hypokinesis with regional variation   MITRAL REGURGITATION  mild         CORONARY ARTERIES     LM Less than 10% gawyjfmw-ktx-[id number] stenosis            LAD Moderately calcified, 20 to 30% proximal-mid stenosis, distal vessel tapers at the apex with 60% diffuse disease. D1 has an ostial/proximal 75 to 80% stenosis. LCX  Calcified, proximal-mid 75 to 80% stenosis. Distal vessel is tortuous with 70 to 85% diffuse stenosis with more discrete stenosis noted distally. OM 1 is a very small vessel with ostial/proximal 80% stenosis and 60 to 70% diffuse disease in the gjrfhtww-eid-nmsvth vessel. Martha Ceron RCA Dominant, calcified, tortuous, there is a proximal-mid stent with 60 to 70% in-stent restenosis. Distal vessel 20 to 30% stenosis         CONCLUSIONS:      Mild to moderately increased filling pressures  Patent RCA stent with moderate to borderline severe in-stent restenosis  Severe circumflex and D1 stenosis  Continue medical management, consider outpatient high risk PCI of above territories pending outpatient clinical follow-up. Currently medical management seems best given comorbidities and need for additional fluid removal.  If PCI is pursued, will likely need general anesthesia. Patient had difficulty lying still on Cath Lab table.      Okay to resume Eliquis tomorrow, case/plan/findings discussed with patient and wife, they understand/agree, they stated it is incorrectly stated in chart the patient would refuse blood, he is okay to receive blood products if needed. Continue beta-blocker and Entresto    Echo 5/29/22   Summary   Definity® used for myocardial border enhancement. Left ventricular systolic function is severely reduced with a visually   estimated ejection fraction of 20-25 %. EF estimated by Jasmine's method at 24 %. The left ventricle is mildly dilated in size with normal wall thickness. Severe global hypokinesis with regional variation. Grade I diastolic dysfunction with normal LV pressure. There is no evidence of a left ventricular thrombus. Right ventricular systolic function is reduced. A 29 mm Langford Leyda S3 bioprosthetic aortic valve appears well seated   with normal Doppler values. Inadequate tricuspid valve regurgitation to estimate systolic pulmonary   artery pressure. There is a moderate left pleural effusion noted. Principal Problem:    CHF (congestive heart failure) ([facility])  Active Problems:    CAD (coronary artery disease)    Uncontrolled hypertension    Acute respiratory failure with hypoxia and hypercapnia ([facility])    Obesity (BMI 30-39.9)    ZAINAB on CPAP    Pleural effusion on left    ESRD (end stage renal disease) on dialysis ([facility])    PAF (paroxysmal atrial fibrillation) ([facility]  Resolved Problems:    * No resolved hospital problems.  *      Assessment:  Shortness of breath  ELevated troponin- chronically elevated    PAF  Multivessel CAD; CAD s/p PCI to LAD in 2015, RCA 1/2022   - severe LCx and D1 stenosis 6/2022 - med mgmt   AS S/P TAVR 2019  Ishcemic cardiomyopathy  HTN, DM, HLD  PAD s/p PTA right iliac artery  Left pleural effusion   ESRD on HD  ZAINAB   Hx of CVA      Plan:  Limited echo ordered   Repeat CXR Pa and LAt to evaluate left pleural effusion; may need to consider thoracentesis (eliquis on hold, but plavix is not)   Volume control HD  Toprol 100mg BID  Entresto 24-26mg BID  Isordil 20mg TID  Eliquis on hold  Continue plavix and statin for Recent PCI 1/2022      Education provided today Disease process and medications discussed. Questions answered fully.   Total Time spent educating today 10 minutes     JAY Beckwith - CNP,  9/27/2022, 2:59 PM

## 2022-09-27 NOTE — PROGRESS NOTES
09/27/22 0008   NIV Type   $NIV $Daily Charge   Equipment Type V60   Mode Bilevel   Mask Type Full face mask   Mask Size Large   Settings/Measurements   PIP Observed 19 cm H20   IPAP 18 cmH20   CPAP/EPAP 6 cmH2O   Vt (Measured) 864 mL   Rate Ordered 14   Resp 14   FiO2  40 %   I Time/ I Time % 1 s   Minute Volume (L/min) 12.1 Liters   Mask Leak (lpm) 58 lpm   Comfort Level Good   Using Accessory Muscles No   SpO2 97   Patient's Home Machine No   Alarm Settings   Alarms On Y   Low Pressure (cmH2O) 6 cmH2O   High Pressure (cmH2O) 30 cmH2O   Apnea (secs) 20 secs   RR Low (bpm) 6   RR High (bpm) 40 br/min

## 2022-09-28 ENCOUNTER — APPOINTMENT (OUTPATIENT)
Dept: CT IMAGING | Age: 52
DRG: 291 | End: 2022-09-28
Payer: MEDICARE

## 2022-09-28 PROBLEM — I31.39 PERICARDIAL EFFUSION: Status: ACTIVE | Noted: 2022-09-28

## 2022-09-28 LAB
ALBUMIN SERPL-MCNC: 2.9 G/DL (ref 3.4–5)
ANION GAP SERPL CALCULATED.3IONS-SCNC: 20 MMOL/L (ref 3–16)
BLOOD CULTURE, ROUTINE: NORMAL
BUN BLDV-MCNC: 68 MG/DL (ref 7–20)
CALCIUM SERPL-MCNC: 8.4 MG/DL (ref 8.3–10.6)
CHLORIDE BLD-SCNC: 92 MMOL/L (ref 99–110)
CO2: 16 MMOL/L (ref 21–32)
CREAT SERPL-MCNC: 7.7 MG/DL (ref 0.9–1.3)
CULTURE, BLOOD 2: NORMAL
GFR AFRICAN AMERICAN: 9
GFR NON-AFRICAN AMERICAN: 7
GLUCOSE BLD-MCNC: 134 MG/DL (ref 70–99)
GLUCOSE BLD-MCNC: 149 MG/DL (ref 70–99)
GLUCOSE BLD-MCNC: 152 MG/DL (ref 70–99)
GLUCOSE BLD-MCNC: 183 MG/DL (ref 70–99)
HCT VFR BLD CALC: 33.2 % (ref 40.5–52.5)
HEMOGLOBIN: 10.6 G/DL (ref 13.5–17.5)
MCH RBC QN AUTO: 28.1 PG (ref 26–34)
MCHC RBC AUTO-ENTMCNC: 31.8 G/DL (ref 31–36)
MCV RBC AUTO: 88.3 FL (ref 80–100)
PDW BLD-RTO: 15.5 % (ref 12.4–15.4)
PERFORMED ON: ABNORMAL
PHOSPHORUS: 8.5 MG/DL (ref 2.5–4.9)
PLATELET # BLD: 251 K/UL (ref 135–450)
PMV BLD AUTO: 8.3 FL (ref 5–10.5)
POTASSIUM REFLEX MAGNESIUM: 5.2 MMOL/L (ref 3.5–5.1)
POTASSIUM SERPL-SCNC: 5.2 MMOL/L (ref 3.5–5.1)
RBC # BLD: 3.76 M/UL (ref 4.2–5.9)
SODIUM BLD-SCNC: 128 MMOL/L (ref 136–145)
WBC # BLD: 6.1 K/UL (ref 4–11)

## 2022-09-28 PROCEDURE — 6370000000 HC RX 637 (ALT 250 FOR IP): Performed by: INTERNAL MEDICINE

## 2022-09-28 PROCEDURE — 97165 OT EVAL LOW COMPLEX 30 MIN: CPT

## 2022-09-28 PROCEDURE — 97530 THERAPEUTIC ACTIVITIES: CPT

## 2022-09-28 PROCEDURE — 99233 SBSQ HOSP IP/OBS HIGH 50: CPT | Performed by: NURSE PRACTITIONER

## 2022-09-28 PROCEDURE — 6370000000 HC RX 637 (ALT 250 FOR IP): Performed by: NURSE PRACTITIONER

## 2022-09-28 PROCEDURE — 97161 PT EVAL LOW COMPLEX 20 MIN: CPT

## 2022-09-28 PROCEDURE — 2700000000 HC OXYGEN THERAPY PER DAY

## 2022-09-28 PROCEDURE — 93308 TTE F-UP OR LMTD: CPT

## 2022-09-28 PROCEDURE — 94761 N-INVAS EAR/PLS OXIMETRY MLT: CPT

## 2022-09-28 PROCEDURE — 36415 COLL VENOUS BLD VENIPUNCTURE: CPT

## 2022-09-28 PROCEDURE — 94640 AIRWAY INHALATION TREATMENT: CPT

## 2022-09-28 PROCEDURE — 99223 1ST HOSP IP/OBS HIGH 75: CPT | Performed by: INTERNAL MEDICINE

## 2022-09-28 PROCEDURE — 71250 CT THORAX DX C-: CPT

## 2022-09-28 PROCEDURE — 94660 CPAP INITIATION&MGMT: CPT

## 2022-09-28 PROCEDURE — 85027 COMPLETE CBC AUTOMATED: CPT

## 2022-09-28 PROCEDURE — 90935 HEMODIALYSIS ONE EVALUATION: CPT

## 2022-09-28 PROCEDURE — 2060000000 HC ICU INTERMEDIATE R&B

## 2022-09-28 PROCEDURE — 80069 RENAL FUNCTION PANEL: CPT

## 2022-09-28 RX ORDER — ASPIRIN 81 MG/1
81 TABLET, CHEWABLE ORAL DAILY
Status: DISCONTINUED | OUTPATIENT
Start: 2022-09-29 | End: 2022-10-04

## 2022-09-28 RX ADMIN — QUETIAPINE FUMARATE 50 MG: 25 TABLET ORAL at 20:12

## 2022-09-28 RX ADMIN — ISOSORBIDE DINITRATE 20 MG: 20 TABLET ORAL at 20:12

## 2022-09-28 RX ADMIN — PANTOPRAZOLE SODIUM 40 MG: 40 TABLET, DELAYED RELEASE ORAL at 05:06

## 2022-09-28 RX ADMIN — OXYCODONE AND ACETAMINOPHEN 1 TABLET: 7.5; 325 TABLET ORAL at 05:09

## 2022-09-28 RX ADMIN — TIOTROPIUM BROMIDE AND OLODATEROL 2 PUFF: 3.124; 2.736 SPRAY, METERED RESPIRATORY (INHALATION) at 08:19

## 2022-09-28 RX ADMIN — METOPROLOL SUCCINATE 100 MG: 50 TABLET, EXTENDED RELEASE ORAL at 20:12

## 2022-09-28 RX ADMIN — ISOSORBIDE DINITRATE 20 MG: 20 TABLET ORAL at 13:34

## 2022-09-28 RX ADMIN — NEPHROCAP 1 MG: 1 CAP ORAL at 13:33

## 2022-09-28 RX ADMIN — PRAVASTATIN SODIUM 40 MG: 40 TABLET ORAL at 13:34

## 2022-09-28 RX ADMIN — DULOXETINE HYDROCHLORIDE 30 MG: 30 CAPSULE, DELAYED RELEASE ORAL at 13:34

## 2022-09-28 RX ADMIN — SACUBITRIL AND VALSARTAN 1 TABLET: 24; 26 TABLET, FILM COATED ORAL at 13:33

## 2022-09-28 RX ADMIN — OXYCODONE AND ACETAMINOPHEN 1 TABLET: 7.5; 325 TABLET ORAL at 13:34

## 2022-09-28 RX ADMIN — CYCLOBENZAPRINE 5 MG: 10 TABLET, FILM COATED ORAL at 16:07

## 2022-09-28 RX ADMIN — OXYCODONE AND ACETAMINOPHEN 1 TABLET: 7.5; 325 TABLET ORAL at 20:12

## 2022-09-28 RX ADMIN — METOPROLOL SUCCINATE 100 MG: 50 TABLET, EXTENDED RELEASE ORAL at 13:34

## 2022-09-28 RX ADMIN — SACUBITRIL AND VALSARTAN 1 TABLET: 24; 26 TABLET, FILM COATED ORAL at 20:12

## 2022-09-28 ASSESSMENT — PAIN DESCRIPTION - LOCATION
LOCATION: BACK;HIP
LOCATION: BACK

## 2022-09-28 ASSESSMENT — PAIN DESCRIPTION - DESCRIPTORS: DESCRIPTORS: ACHING

## 2022-09-28 ASSESSMENT — PAIN SCALES - GENERAL
PAINLEVEL_OUTOF10: 10
PAINLEVEL_OUTOF10: 10
PAINLEVEL_OUTOF10: 4
PAINLEVEL_OUTOF10: 7

## 2022-09-28 ASSESSMENT — PAIN DESCRIPTION - ORIENTATION: ORIENTATION: LOWER

## 2022-09-28 NOTE — PROGRESS NOTES
Occupational Therapy  Facility/Department: [address] [place] [address]  Occupational Therapy Initial Assessment    Name: Miguelito Burch  : 1970  MRN: [id number]  Date of Service: 2022    Discharge Recommendations:  24 hour supervision or assist, Home with Home health OT, S Level 2          Patient Diagnosis(es): The primary encounter diagnosis was Acute on chronic respiratory failure, unspecified whether with hypoxia or hypercapnia ([place]). Diagnoses of Pleural effusion on left, Pneumonia of left lower lobe due to infectious organism, and Uncontrolled hypertension were also pertinent to this visit. Past Medical History:  has a past medical history of Ambulatory dysfunction, Aortic stenosis, Arthritis, Asthma, Bilateral hilar adenopathy syndrome, CAD (coronary artery disease), Cardiomyopathy ([place]), CHF (congestive heart failure) ([place]), Chronic pain, COPD (chronic obstructive pulmonary disease) ([place]), Depression, Diabetes mellitus ([place]), Difficult intravenous access, Emphysema of lung ([place]), ESRD (end stage renal disease) on dialysis ([place]), Fear of needles, Gastric ulcer, GERD (gastroesophageal reflux disease), Heart valve problem, Hemodialysis patient ([place]), History of spinal fracture, Hx of blood clots, Hyperlipidemia, Hypertension, MI (myocardial infarction) ([place]), Neuromuscular disorder ([place]), Numbness and tingling in left arm, Pneumonia, PONV (postoperative nausea and vomiting), Prolonged emergence from general anesthesia, Sleep apnea, Stroke ([place]), TIA (transient ischemic attack), and Unspecified diseases of blood and blood-forming organs. Past Surgical History:  has a past surgical history that includes Tonsillectomy; cyst removal (2013); Colonoscopy; Coronary angioplasty with stent (05/26/15); vascular surgery (aprx 2 years ago); Colonoscopy (); Upper gastrointestinal endoscopy (2016); Upper gastrointestinal endoscopy (2017); other surgical history (2017);  Dialysis fistula creation (Left, 10/30/2017); Diagnostic Cardiac Cath Lab Procedure; other surgical history (2018); other surgical history (Bilateral, 06/26/2018); pr lap insertion tunneled intraperitoneal catheter (N/A, 9/21/2018); other surgical history (Right, 09/2018); Dialysis fistula creation (Left, 3/27/2019); other surgical history (05/28/2019); Endoscopy, colon, diagnostic; Catheter Removal (Right, 7/2/2019); Aortic valve replacement (N/A, 10/15/2019); Rectal surgery (N/A, 2/24/2022); IR TUNNELED CVC PLACE WO SQ PORT/PUMP > 5 YEARS (3/21/2022); IR TUNNELED CVC PLACE WO SQ PORT/PUMP > 5 YEARS (4/21/2022); IR TUNNELED CVC PLACE WO SQ PORT/PUMP > 5 YEARS (4/26/2022); and IR TUNNELED CVC PLACE WO SQ PORT/PUMP > 5 YEARS (6/23/2022). Assessment   Assessment: Pt agreeable to therapy this day however declined that he would need therapy. Pt reports needing Min A with bathing and dressing at baseline and wife is home 24hrs for care if needed. Pt has all needed DME and uses rollator to ambulate at home. Pt reports mult falls and would benefit from [address] [place] [address] for fall reduction. Pt demos baseline level function this day and self-reports baseline level function. 1x only. D/c home with 24hr care and HHOT  Prognosis: Good  Decision Making: Medium Complexity  No Skilled OT: At baseline function; Safe to return home  REQUIRES OT FOLLOW-UP: No  Activity Tolerance  Activity Tolerance: Patient Tolerated treatment well;Treatment limited secondary to agitation        Plan   Plan  Times per Week: 1x only     Restrictions  Restrictions/Precautions  Restrictions/Precautions: General Precautions    Subjective   General  Chart Reviewed: Yes  Patient assessed for rehabilitation services?: Yes  Family / Caregiver Present: No  Referring Practitioner: Scott Tamez MD  Diagnosis: CHF  Subjective  Subjective: Pt agreeable to therapy however reports he does not need therapy. RN approved.  Pt reports 9/10 chronic back pain  9/10 Low back pain chronic pain  Social/Functional History  Social/Functional History  Lives With: Spouse  Type of Home: Trailer (Double wide)  Home Access: Ramped entrance  Bathroom Shower/Tub: Walk-in shower  Bathroom Toilet: Standard  Bathroom Equipment: Shower chair, Grab bars in shower  Home Equipment: Nilson Schmid, [address] bed, Rollator, Lift chair (3O2 at home)  Has the patient had two or more falls in the past year or any fall with injury in the past year?: Yes (Reports hes had about 30 falls in the past year (reports he gets dizzy and/or trips))  Receives Help From: Family  ADL Assistance: Needs assistance (Wife helps with showering and Lower body dressing)  Homemaking Responsibilities: Yes  Ambulation Assistance: Needs assistance (Uses rollator)  Transfer Assistance: Independent  Active : Yes (Not supposed to but does)  Occupation: On disability  Additional Comments: Wife is home 24/7, she is on disability as well       Objective   Heart Rate: 61  Heart Rate Source: Monitor  BP: 133/76  Patient Position: Left side;Semi fowlers  MAP (Calculated): 95  Resp: 16  SpO2: 98 %  O2 Device: Nasal cannula          Observation/Palpation  Posture: Good  Safety Devices  Type of Devices: Left in bed;Call light within reach; Bed alarm in place; Patient at risk for falls;Nurse notified;Gait belt  Bed Mobility Training  Bed Mobility Training: Yes  Overall Level of Assistance: Modified independent (HOB elevated and use of bed rails)  Supine to Sit: Modified independent  Sit to Supine: Modified independent  Transfer Training  Transfer Training: Yes  Overall Level of Assistance: Stand-by assistance (with RW pt refused to perform more than forward and backward walking this day.)  Sit to Stand: Stand-by assistance  Stand to Sit: Stand-by assistance     AROM: Within functional limits  PROM: Within functional limits  Strength:  Within functional limits  Coordination: Within functional limits  Tone: Normal  Sensation: Intact  ADL  Grooming: Setup;Stand by assistance  LE Dressing Skilled Clinical Factors: pt refused to put on socks  Additional Comments: pt declined all other ADLs              Vision  Vision: Impaired  Vision Exceptions: Wears glasses for reading  Hearing  Hearing: Within functional limits  Cognition  Overall Cognitive Status: WFL  Orientation  Overall Orientation Status: Within Functional Limits  Orientation Level: Oriented to place;Oriented to situation;Oriented to person;Disoriented to time                  Education Given To: Patient  Education Provided: Role of Therapy;Transfer Training;Plan of Care;Energy Conservation; ADL Adaptive Strategies  Education Provided Comments: Disease specific education and use of call light. Pt attempted to be education on CHF however pt stated \"throw those papers away, I have enough of those and do not need any more\" pt also stated he feels he is managing his CHF \"as best as I can. \"  Education Method: Demonstration;Verbal  Barriers to Learning: None  Education Outcome: Verbalized understanding;Demonstrated understanding      AM-PAC Score        AM-PAC Inpatient Daily Activity Raw Score: 21 (09/28/22 1544)  AM-PAC Inpatient ADL T-Scale Score : 44.27 (09/28/22 1544)  ADL Inpatient CMS 0-100% Score: 32.79 (09/28/22 1544)  ADL Inpatient CMS G-Code Modifier : CJ (09/28/22 1544)    Goals  Short Term Goals  Time Frame for Short term goals: 1x only       Therapy Time   Individual Concurrent Group Co-treatment   Time In 1426         Time Out 1444         Minutes 18         Timed Code Treatment Minutes: 8 Minutes (10 minute eval)       Britney Goode OT  If pt is unable to be seen after this session, please let this note serve as discharge summary. Please see case management note for discharge disposition. Thank you.

## 2022-09-28 NOTE — CONSULTS
[address] 53 1970    History:  Past Medical History:   Diagnosis Date    Ambulatory dysfunction     walker for long distances, SOB with distance    Aortic stenosis     echo 2017    Arthritis     hands and hips    Asthma     Bilateral hilar adenopathy syndrome 6/3/2013    CAD (coronary artery disease)     Dr. Blanka Johnson [facility]) 04/19/2019    EF= 43%    CHF (congestive heart failure) ([address]     Chronic pain     COPD (chronic obstructive pulmonary disease) ([address]     pulmonology Dr. Elda Jacobs    Depression     Diabetes mellitus ([address]     borderline    Difficult intravenous access     Emphysema of lung ([address]     ESRD (end stage renal disease) on dialysis ([address]     MWF    Fear of needles     Gastric ulcer     GERD (gastroesophageal reflux disease)     Heart valve problem     bicuspic valve    Hemodialysis patient ([address]     History of spinal fracture     work incident    Hx of blood clots     Bilateral lower extremities; stents in place    Hyperlipidemia     Hypertension     MI (myocardial infarction) ([address] 2019    has had 9 MIs. 2019 was the last    Neuromuscular disorder ([address]     due to CVA    Numbness and tingling in left arm     from fistula    Pneumonia     PONV (postoperative nausea and vomiting)     Prolonged emergence from general anesthesia     States requires more medication than most people    Sleep apnea     Uses CPAP    Stroke ([address]     7mm thalamic cva 2017 deficts left side, left side weakness    TIA (transient ischemic attack)     Unspecified diseases of blood and blood-forming organs        ECHO:  6/3/22   EF 20-25%  HgA1C:  9/24/22   7.2  Iron Saturation:  6/23/22   23  Ferritin: 1/12/22   624.0    Sleep evaluation: no  Pulmonologist: referral to Dr Loreta Hamilton for follow up suggested not done yet  Cpap/bipap:n/a    ACE/ARB/ARNI: Entresto 24-26 mg 1 tablet BID  BB: Toprol XL 100mg BID  Spironolactone:  Isordil 20 mg TID  SGLT2:    Last Hospital Admission:  7/17/22  acute on chronic chf  Discharge plans: [facility] referral placed    Advanced Directives: patient has advance directives scanned in the chart    Chart review completed. Patient a 45year old male, admitted for pleural effusions. Hospital day 5, currently admitted on C4. Pt very familiar to writer for several previous admissions. Pt is an HD patient and frequently misses treatments for various reasons. Cardiology and nephrology both following and assisting in treatments. Plans are for patient to discharge to facility and referral made to [facility]. They will assist with monitoring for s/s of CHF including daily weight and diet and fluid restrictions. Patient recent weights and intake/output reviewed:    Patient Vitals for the past 96 hrs (Last 3 readings):   Weight   09/28/22 0510 236 lb 8.9 oz (107.3 kg)   09/27/22 1357 235 lb 0.2 oz (106.6 kg)   09/27/22 0530 235 lb 0.2 oz (106.6 kg)         Intake/Output Summary (Last 24 hours) at 9/28/2022 1306  Last data filed at 9/28/2022 2362  Gross per 24 hour   Intake 560 ml   Output 0 ml   Net 560 ml     Education Time: chart review completed.       Mariela Guerrero RN     9/28/2022 1:06 PM

## 2022-09-28 NOTE — CONSULTS
INPATIENT PULMONARY CRITICAL CARE CONSULT NOTE      Chief Complaint/Referring Provider:  Patient is being seen at the request of Herlinda GONSALEZ for a consultation for Left pleural effusion      Presenting HPI: Patient was brought to the hospital with increasing shortness of breath    As per admitting provider-52 y.o. male who presented to [facility] with past medical history of arthritis, asthma, CAD, GERD, history of blood clots, hyperlipidemia, end-stage renal disease on hemodialysis, HFrEF EF 25%, severe aortic stenosis status post TAVR, COPD, depression, presented to the ED with chief complaint of respiratory distress. Patient reported that he he has been compliant with salt diet, does not drink fluids more than 2 L/day, patient also has been going to all his dialysis session 3 times a day in addition to be compliant with CPAP. Patient reported that he went to dialysis today and noticed that he has not been able to complete more than 45 minutes due to worsening severe shortness of breath thus was sent here to the ED. Patient reports otherwise no known alleviating or exacerbating factor reports that he is doing everything he can and it still unclear why he continues to be short of breath.   Patient does report association with cough and phlegm production has been going on for few days but no fever    Patient when seen this morning was getting HD, patient states that he was having cough with mucoid expectoration along with shortness of breath and wheezing prior to coming to the hospital and patient's symptoms were progressive, patient also has had some rhinorrhea, patient does not have any chest pain but had chest tightness, patient did not have any significant fever or chills, no difficulty in swallowing, no coughing or choking while eating, no odynophagia or dysphagia per se, patient does not have any significant abdominal pain or any altered bowel habits, patient does not have any increasing leg edema, patient's dry weight is maintained, patient states that he was compliant with his dialysis and he has not missed his dialysis, patient on questioning further states that he did not have any change in the ambient murmur or sick contacts, patient does not have any confusion lethargy, patient has been compliant with his CPAP which he is getting for ZAINAB, patient does not have any other pertinent review of system of concern  Patient was supposed to have a 4 L removal but it was decreased to 3.6 as per HD nurse because of hemodynamics, patient is afebrile and has sinus rhythm on the monitor which was ranging from sinus bradycardia to normal sinus rhythm, patient blood sugars are slightly on the higher side, patient was alert and oriented, no other issues of concern     Patient Active Problem List    Diagnosis Date Noted    Hypotension due to drugs 11/25/2017    Acute on chronic combined systolic and diastolic heart failure ([address]     Ischemic cardiomyopathy     Dyslipidemia     Type 2 diabetes, uncontrolled, with neuropathy ([address] 03/04/2014    Bicuspid aortic valve 06/03/2013    CHF (congestive heart failure) ([address] 05/14/2013    CAD (coronary artery disease) 05/14/2013    PVD (peripheral vascular disease) ([address] 05/14/2013    HFrEF (heart failure with reduced ejection fraction) ([address] 09/24/2022    Acute respiratory failure with hypoxia and hypercapnia ([address] 09/23/2022    Hypertensive emergency 07/17/2022    SOB (shortness of breath) 07/17/2022    Altered mental status     Hypoglycemia 05/29/2022    Morbid obesity with BMI of 40.0-44.9, adult ([address] 04/26/2022    Former smoker 04/19/2022    Cardiomyopathy ([address] 04/19/2022    History of transcatheter aortic valve replacement (TAVR) 10/19/2019    Uncontrolled hypertension 11/14/2013    Asthma-COPD overlap syndrome ([address] 05/14/2013    Respiratory failure ([address] 04/18/2022    Pulmonary edema with diastolic CHF, NYHA class 3 ([address] 03/17/2022    [place]-rectal abscess     Somnolence     Atrial flutter ([address]     SIRS (systemic inflammatory response syndrome) ([address] 02/21/2022    NSTEMI (non-ST elevated myocardial infarction) ([address] 01/12/2022    Acute respiratory failure with hypercapnia (HCC) 11/30/2021    Acute pulmonary edema (HCC) 11/30/2021    Grade II diastolic dysfunction 90/69/2574    Shock circulatory ([address] 11/30/2021    Smoker 11/30/2021    Normocytic normochromic anemia 11/30/2021    Respiratory failure with hypoxia ([address] 11/29/2021    Speech problem     Urinary tract infection with hematuria     Acute CVA (cerebrovascular accident) ([address] 09/07/2021    Acute hypoxemic respiratory failure ([address] 08/12/2021    Type 2 diabetes mellitus with hyperglycemia ([address] 06/27/2021    Acute encephalopathy 06/27/2021    Cellulitis and abscess of hand 04/24/2021    Iliac artery occlusion, right (HCC)     Cellulitis of right foot 01/29/2021    Peripheral vascular occlusive disease ([address] 01/02/2021    Ataxia     Weakness of both lower extremities 12/30/2020    Sinus bradycardia 12/30/2020    Sinus pause     GBS (Guillain-[place] syndrome) ([facility])     Bilateral leg weakness 12/04/2020    Bradycardia 10/19/2019    Syncope and collapse 10/19/2019    PAF (paroxysmal atrial fibrillation) ([address]     Hypercholesteremia 07/30/2019    Chronic bronchitis ([address] 05/21/2019    Nasal congestion 05/21/2019    Chronic, continuous use of opioids 04/15/2019    Steal syndrome of dialysis vascular access (HCC)     SOB (shortness of breath) on exertion 12/24/2018    Anemia of chronic disease 11/12/2018    Pulmonary edema 11/09/2018    Fluid overload 11/09/2018    Renovascular hypertension     Mixed hyperlipidemia     Cigarette nicotine dependence in remission     Neuromuscular disorder ([address]     Acute diastolic CHF (congestive heart failure) ([address] 03/18/2018    Hemodialysis-associated hypotension 11/22/2017    ESRD (end stage renal disease) on dialysis ([address] 11/22/2017    Mucus plugging of bronchi Nonrheumatic aortic valve stenosis     Pleural effusion on left     Chronic anemia     History of CVA (cerebrovascular accident)     Type 2 diabetes mellitus without complication, without long-term current use of insulin ([facility])     ZAINAB (obstructive sleep apnea)     Tobacco abuse 05/21/2017    CVA (cerebral vascular accident) ([address] 05/21/2017    Angina, class IV ([address]     Dyspnea     Gastroparesis due to DM ([address]     Biliary colic 44/93/0928    Symptomatic cholelithiasis 01/04/2017    Degeneration of lumbar or lumbosacral intervertebral disc 03/18/2016    Lumbar radiculopathy 03/18/2016    Lumbosacral spondylosis without myelopathy 03/18/2016    ZAINAB on CPAP 02/11/2016    Obesity (BMI 30-39. 9)     PNA (pneumonia) 03/28/2015    Depression with anxiety 06/05/2014    Passive smoke exposure 05/30/2014    Diabetic neuropathy ([address] 11/14/2013    Claudication in peripheral vascular disease ([address] 06/26/2013    Bilateral hilar adenopathy syndrome 06/03/2013    Sepsis without acute organ dysfunction ([address] 12/25/2012       Past Medical History:   Diagnosis Date    Ambulatory dysfunction     walker for long distances, SOB with distance    Aortic stenosis     echo 2017    Arthritis     hands and hips    Asthma     Bilateral hilar adenopathy syndrome 6/3/2013    CAD (coronary artery disease)     [facility]) 04/19/2019    EF= 43%    CHF (congestive heart failure) ([facility])     Chronic pain     COPD (chronic obstructive pulmonary disease) ([address]     pulmonology Dr. Florencio Hernandez    Depression     Diabetes mellitus ([address]     borderline    Difficult intravenous access     Emphysema of lung ([address]     ESRD (end stage renal disease) on dialysis ([address]     MWF    Fear of needles     Gastric ulcer     GERD (gastroesophageal reflux disease)     Heart valve problem     bicuspic valve    Hemodialysis patient ([address]     History of spinal fracture     work incident    Hx of blood clots     Bilateral lower extremities; stents in place    Hyperlipidemia     Hypertension     MI (myocardial infarction) ([address] 2019    has had 9 MIs. 2019 was the last    Neuromuscular disorder ([address]     due to CVA    Numbness and tingling in left arm     from fistula    Pneumonia     PONV (postoperative nausea and vomiting)     Prolonged emergence from general anesthesia     States requires more medication than most people    Sleep apnea     Uses CPAP    Stroke ([address]     7mm thalamic cva 2017 deficts left side, left side weakness    TIA (transient ischemic attack)     Unspecified diseases of blood and blood-forming organs         Past Surgical History:   Procedure Laterality Date    AORTIC VALVE REPLACEMENT N/A 10/15/2019    TRANSCATHETER AORTIC VALVE REPLACEMENT FEMORAL APPROACH performed by Noemí Crain MD at [address] [facility] Right 7/2/2019    PERITONEAL DIALYSIS CATHETER REMOVAL performed by Estelle Choi MD at [address] [place] [address] Road  2/29/2015    WN    CORONARY ANGIOPLASTY WITH STENT PLACEMENT  05/26/15    CYST REMOVAL  08/14/2013    EXCISION CYSTS, NECK X2 AND ABDOMINAL benign    DIAGNOSTIC CARDIAC CATH LAB PROCEDURE      DIALYSIS FISTULA CREATION Left 10/30/2017    LEFT BRACHIAL CEPHALIC FISTULA    DIALYSIS FISTULA CREATION Left 3/27/2019    LIGATION  AV FISTULA performed by Juan Boogie MD at [address] [facility] [place], DIAGNOSTIC      IR TUNNELED CATHETER PLACEMENT GREATER THAN 5 YEARS  3/21/2022    IR TUNNELED CATHETER PLACEMENT GREATER THAN 5 YEARS 3/21/2022 [facility] PROCED[facility]    IR TUNNELED CATHETER PLACEMENT GREATER THAN 5 YEARS  4/21/2022    IR TUNNELED CATHETER PLACEMENT GREATER THAN 5 YEARS 4/21/2022 [facility] PROC[facility]    IR TUNNELED CATHETER PLACEMENT GREATER THAN 5 YEARS  4/26/2022    IR TUNNELED CATHETER PLACEMENT GREATER THAN 5 YEARS 4/26/2022 [facility] PROCEDURES    IR TUNNELED CATHETER PLACEMENT GREATER THAN 5 YEARS  6/23/2022    IR TUNNELED CATHETER PLACEMENT GREATER THAN 5 YEARS 2022 [facility] SPECIAL PROCEDURES    OTHER SURGICAL HISTORY  2017    laparoscopic cholecystectomy with intraoperative cholangiogram    OTHER SURGICAL HISTORY  2018    PORT PLACEMENT  - vas cath    OTHER SURGICAL HISTORY Bilateral 2018    laprascopic peritoneal dialysis catheter placement    OTHER SURGICAL HISTORY Right 2018    peritoneal dialysis port placed on right side of abdomen    OTHER SURGICAL HISTORY  2019    PTA/Stenting R External Iliac artery    VA LAP INSERTION TUNNELED INTRAPERITONEAL CATHETER N/A 2018    LAPAROSCOPIC PERITONEAL DIALYSIS CATHETER REPLACEMENT performed by Hallie Osborne MD at [address] 2022    PERINEAL ABCESS DRAINAGE performed by Hallie Osborne MD at [address]  2016    UPPER GASTROINTESTINAL ENDOSCOPY  2017    possible candida, otherwise normal appearing    VASCULAR SURGERY  aprx 2 years ago    2 stents placed, each side of groin        Family History   Problem Relation Age of Onset    Diabetes Mother     Heart Disease Father     Kidney Disease Sister         stage 4-kidney failure    Cancer Sister     Heart Disease Sister     Obesity Sister     Cancer Sister     Heart Disease Sister     Obesity Sister     Alcohol Abuse Brother         Social History     Tobacco Use    Smoking status: Former     Packs/day: 0.50     Years: 33.00     Pack years: 16.50     Types: Cigarettes     Quit date: 2020     Years since quittin.4    Smokeless tobacco: Never    Tobacco comments:     States quit 2021   Substance Use Topics    Alcohol use: Not Currently     Alcohol/week: 0.0 standard drinks     Comment: occ        Allergies   Allergen Reactions    Morphine Nausea And Vomiting               Physical Exam:  Blood pressure 133/76, pulse 61, temperature 97.7 °F (36.5 °C), resp.  rate 16, height 5' 9\" (1.753 m), weight 227 lb 11.8 oz (103.3 kg), SpO2 98 %.'     Constitutional:No profound respiratory distress  HENT: No facial asymmetry no thyromegaly. Eyes:  Conjunctivae are normal. Pupils equal, round, and reactive to light. No scleral icterus. Neck: . No tracheal deviation present. No obvious thyroid mass. Short large neck  Cardiovascular: Normal rate, regular rhythm, normal heart sounds. No right ventricular heave. some lower extremity edema. Pulmonary/Chest: No wheezes. Bilateral rales. Chest wall is not dull to percussion. Some accessory muscle usage or stridor. Decreased breath sound intensity(L>R)  Abdominal: Soft. Bowel sounds present. No distension or hernia. No tenderness. Obese  Musculoskeletal: No cyanosis. No clubbing. No obvious joint deformity. Lymphadenopathy: No cervical or supraclavicular adenopathy. Skin: Skin is warm and dry. No rash or nodules on the exposed extremities. Psychiatric: Normal reaction ;no anxiety   Neurologic Alert and communicative     Results:  CBC:   Recent Labs     09/27/22  0509 09/28/22  0506   WBC 5.8 6.1   HGB 10.7* 10.6*   HCT 32.7* 33.2*   MCV 89.4 88.3    251     BMP:   Recent Labs     09/26/22  0451 09/27/22  0509 09/28/22  0506   * 129* 128*   K 5.0 4.7 5.2*  5.2*   CL 93* 92* 92*   CO2 21 20* 16*   PHOS  --   --  8.5*   BUN 63* 47* 68*   CREATININE 7.5* 6.5* 7.7*     Imaging:  I have reviewed radiology images personally. XR CHEST (2 VW)   Final Result   1. Diffuse opacity lower 2/3 left hemithorax obscuring the left hilum, left   heart margin and left hemidiaphragm slightly increased since prior study in   keeping with consolidation and pleural effusion. 2. Right lung appears clear. 3. Stable left IJ hemodialysis catheter, tip at the superior cavoatrial   junction level. XR CHEST PORTABLE   Final Result   Moderate left pleural effusion with associated left lower lobe airspace   opacities.          CT CHEST WO CONTRAST    (Results Pending)     Echo Limited    Result Date: 9/28/2022  Transthoracic Echocardiography Report (TTE)  Demographics   Patient Name       Olga Coates   Date of Study      09/28/2022         Gender               Male   Patient Number     [id number]         Date of Birth        1970   Visit Number       [id number]          Age                  45 year(s)   Accession Number   [id number]         Room Number          8076   Corporate ID       [id number]            Sonographer          Meghan Colindres, RT   Ordering Physician Lord Hernandez, CNP             Physician            Montana Wall MD  Procedure Type of Study   TTE procedure:ECHOCARDIOGRAM LIMITED. Procedure Date Date: 09/28/2022 Start: 07:01 AM Study Location: [address] [place] [address] - Echo Lab Technical Quality: Adequate visualization Indications:Dyspnea/SOB. Patient Status: Routine Height: 69 inches Weight: 235.01 pounds BSA: 2.21 m2 BMI: 34.7 kg/m2 BP: 101/60 mmHg  Conclusions   Summary  Patient refused use of Definity contrast.  Limited only f/u for LVEF and s/p TAVR. The left ventricular systolic function appears severely reduced with  visually estimated ejection fraction of 20-25%. Poor endocardial visualization. By history, there is a #29 mm Langford Leyda S3 bioprosthetic valve in the  aortic position. The valve appears well seated with no rocking motion. Valve  leaflets are not well-evaluated. Normal Doppler values. No evidence of aortic valve regurgitation. Pericardial effusion, noted darrick-apically where measures up to 1.5 cm,  anteriorly along right ventricle, with exudate. Appears likely trace along  lateral wall. Difficult to discern apically/laterally however, whether  pericardial or tracking pleural effusion. No tamponade physiology. There is a large left pleural effusion. Non-contrasted CT is suggested for further characterization of pericardial  versus pleural effusion.    Signature ------------------------------------------------------------------  Electronically signed by Jacki Miranda MD (Interpreting  physician) on 09/28/2022 at 11:29 AM  ------------------------------------------------------------------   Findings   Left Ventricle  The left ventricular systolic function appears severely reduced with  visually estimated ejection fraction of 20-25%. Poor endocardial visualization. Aortic Valve  By history, there is a #29 mm Langford Leyda S3 bioprosthetic valve in the  aortic position. The valve appears well seated with no rocking motion. Valve  leaflets are not well-evaluated. Normal Doppler values. There is a maximum gradient of 18 mmHg, a mean gradient of 8 mmHg, and the  valve area is calculated at 2.13 cm2 by the continuity equation. No evidence of aortic valve regurgitation. Pericardial Effusion  Pericardial effusion, noted darrick-apically where measures up to 1.5 cm,  anteriorly along right ventricle, with exudate. Appears likely trace along  lateral wall. Difficult to discern apically/laterally however, whether  pericardial or tracking pleural effusion. No IVC plethora, chamber collapse,  or respiration flow variation. Pleural Effusion  There is a large left pleural effusion. Miscellaneous  IVC size is normal (<2.1cm) and collapses > 50% with respiration consistent  with normal RA pressure (3mmHg).   M-Mode/2D Measurements (cm)                AO Root Dimension: 2.2 cm               AV Cusp Separation: 1.5 cm  LVOT: 2.6 cm  Doppler Measurements   AV Peak Velocity: 211 cm/s  AV Peak Gradient: 17.81 mmHg  AV Mean Gradient: 8 mmHg  LVOT Peak Velocity: 83.6 cm/s  AV Area (Continuity):2.26 cm2   Aortic Valve   Peak Velocity: 211 cm/s     Mean Velocity: 129 cm/s  Peak Gradient: 17.81 mmHg   Mean Gradient: 8 mmHg  Area (continuity): 2.26 cm2  AV VTI: 42.3 cm   Cusp Separation: 1.5 cm  Aorta   Aortic Root: 2.2 cm  LVOT Diameter: 2.6 cm      XR CHEST (2 VW)    Result Date: 9/27/2022  EXAMINATION: TWO XRAY VIEWS OF THE CHEST 9/27/2022 3:17 pm COMPARISON: Chest 09/23/2022 HISTORY: ORDERING SYSTEM PROVIDED HISTORY: evaluate left pleural effusion Reason for Exam: f/u FINDINGS: *4 images are presented. *Diffuse opacity lower 2/3 left hemithorax obscuring the left hilum, left heart margin and left hemidiaphragm slightly increased since prior study in keeping with consolidation and pleural effusion. *Right lung appears clear. *Stable left IJ hemodialysis catheter, tip at the superior cavoatrial junction level. *No acute osseous abnormality. *TAVR. 1. Diffuse opacity lower 2/3 left hemithorax obscuring the left hilum, left heart margin and left hemidiaphragm slightly increased since prior study in keeping with consolidation and pleural effusion. 2. Right lung appears clear. 3. Stable left IJ hemodialysis catheter, tip at the superior cavoatrial junction level. XR CHEST PORTABLE    Result Date: 9/23/2022  EXAMINATION: ONE XRAY VIEW OF THE CHEST 9/23/2022 4:44 pm COMPARISON: Chest x-ray dated 2 August 2022 HISTORY: ORDERING SYSTEM PROVIDED HISTORY: SOB, decreased sounds on Left TECHNOLOGIST PROVIDED HISTORY: Reason for exam:->SOB, decreased sounds on Left Reason for Exam: sob FINDINGS: Mild cardiomegaly with left-sided central venous catheter with the tip in the superior vena cava. The right lung is clear. Stable appearance of the left lung with moderate left pleural effusion and left lower lobe airspace opacities. Moderate left pleural effusion with associated left lower lobe airspace opacities.       Latest Reference Range & Units 9/23/22 17:38 9/23/22 23:50   Hemoglobin, Art, Extended 13.5 - 17.5 g/dL 12.3 (L)    pH, Arterial 7.350 - 7.450  7.457 (H)    pCO2, Arterial 35.0 - 45.0 mmHg 32.0 (L)    pO2, Arterial 75.0 - 108.0 mmHg 395.1 (H)    HCO3, Arterial 21.0 - 29.0 mmol/L 22.1    TCO2 (calc), Art Not Established mmol/L 23.1    Base Excess, Arterial -3.0 - 3.0 mmol/L -1.0    O2 Sat, Arterial >92 % 99.4    Methemoglobin, Arterial <1.5 % 0.2    Carboxyhgb, Arterial 0.0 - 1.5 % 0.4    pH, Blade 7.350 - 7.450   7.364   pCO2, Blade 40.0 - 50.0 mmHg  42.5   pO2, Blade 25.0 - 40.0 mmHg  125.0 (H)   HCO3, Venous 23.0 - 29.0 mmol/L  23.7   TC02 (Calc), Blade Not Established mmol/L  25   Base Excess, Blade -3.0 - 3.0 mmol/L  -1.7   MetHgb, Blade <1.5 %  0.4   O2 Sat, Blade Not Established %  98         Echocardiogram:Summary   Patient refused use of Definity contrast.   Limited only f/u for LVEF and s/p TAVR. The left ventricular systolic function appears severely reduced with   visually estimated ejection fraction of 20-25%. Poor endocardial visualization. By history, there is a #29 mm Langford Leyda S3 bioprosthetic valve in the   aortic position. The valve appears well seated with no rocking motion. Valve   leaflets are not well-evaluated. Normal Doppler values. No evidence of aortic valve regurgitation. Pericardial effusion, noted darrick-apically where measures up to 1.5 cm,   anteriorly along right ventricle, with exudate. Appears likely trace along   lateral wall. Difficult to discern apically/laterally however, whether   pericardial or tracking pleural effusion. No tamponade physiology. There is a large left pleural effusion. PFT:PFT:2016-INDICATIONS:  COPD. 1.  SPIROMETRY:  The FEV-1 is 1.71 L which is 44% predicted. The FEV-1/FVC  ratio is normal.  Inhaled bronchodilators are given. There is significant  improvement in the FEV-1 at 29%. 2.  LUNG VOLUMES:  Total lung capacity is normal at 6.08 L or 86% predicted. 3.  DIFFUSION CAPACITY:  DLCO is 28.26 mL per minute per mmHg which is 79%  predicted. 4.  FLOW VOLUME LOOP:  Does not show an obvious obstructive pattern. No variable or fixed obstruction pattern on the flow volume loop done in 2016    1.  Increased size of a large left pleural effusion with adjacent   consolidation in the left upper and lower lobes, particularly the lower lobe. Airspace consolidation could represent atelectasis related to the effusion or   pneumonia. 2. Stable 3 mm left upper lobe lung nodule. If patient is considered high   risk, a 1 year follow-up chest CT is suggested. Assessment:  Principal Problem:    CHF (congestive heart failure) ([facility])  Active Problems:    CAD (coronary artery disease)    Uncontrolled hypertension    Acute respiratory failure with hypoxia and hypercapnia ([facility])    Obesity (BMI 30-39.9)    ZAINAB on CPAP    Pleural effusion on left    ESRD (end stage renal disease) on dialysis ([facility])    PAF (paroxysmal atrial fibrillation) ([facility]  Resolved Problems:    * No resolved hospital problems.  *          Plan:   Oxygen supplementation  to keep saturation being 90-94% only  Please titrate down the oxygen as per the above parameters  Home CPAP on intermittent basis  Patient's acid-base balance is maintained and does not need any change or escalation   Pulmonary toilet  Patient's x-ray chest shows patient to have mild pulmonary venous congestion and left pleural effusion   Patient does not need any antibiotics from pulmonary standpoint of view  HD as per nephrology-getting one when seen and 3.6 Kg removal being contemplated   Patient has large pleural effusion which may be contributing to patient's symptoms  Patient's Eliquis is on hold   Patient's plavix is held since 9/24/22 -will contempate ultrasound guided thoracentesis in AM   Antihypertensives as per IM/nephrology   Patient may need dry weight to be modified-nephrology to decide upon that   Optimization of cardiac medications as per cardiology  Bronchodilators  No need for  any systemic steroids at this time  Blood glucose monitoring with sliding scale insulin  May need long acting insulin -as per IM   Trend hemodynamics and cardiac rhythm closely  Diet and life style modifications  PUD prophylaxis    Case d/w patient and HD nursing       Electronically signed by:  Anamika Almanzar MD 9/28/2022    3:50 PM.

## 2022-09-28 NOTE — PROGRESS NOTES
09/28/22 0418   NIV Type   $NIV $Daily Charge   Equipment Type V60   Mode Bilevel   Mask Type Full face mask   Mask Size Large   Settings/Measurements   IPAP 18 cmH20   CPAP/EPAP 6 cmH2O   Vt (Measured) 585 mL   Rate Ordered 14   Resp 15   FiO2  40 %   I Time/ I Time % 1 s   Minute Volume (L/min) 7.7 Liters   Mask Leak (lpm) 55 lpm   Comfort Level Good   SpO2 96   Alarm Settings   Alarms On Y

## 2022-09-28 NOTE — PROGRESS NOTES
Hospitalist Progress Note      PCP: Tommy Mccurdy MD    Date of Admission: 9/23/2022    Chief Complaint: SOB    Subjective: no new c/o. Seen in HD 28 Sept      Medications:  Reviewed    Infusion Medications    sodium chloride      dextrose       Scheduled Medications    epoetin siddharth-epbx  5,400 Units IntraVENous Q MWF    sacubitril-valsartan  1 tablet Oral BID    insulin lispro  0-4 Units SubCUTAneous TID WC    insulin lispro  0-4 Units SubCUTAneous Nightly    [Held by provider] apixaban  5 mg Oral BID    clopidogrel  75 mg Oral Daily    DULoxetine  30 mg Oral Daily    linaclotide  145 mcg Oral QAM AC    isosorbide dinitrate  20 mg Oral TID    pantoprazole  40 mg Oral QAM AC    pravastatin  40 mg Oral Daily    tiotropium-olodaterol  2 puff Inhalation Daily    QUEtiapine  50 mg Oral Nightly    metoprolol succinate  100 mg Oral BID    docusate sodium  100 mg Oral Daily    b complex-C-folic acid  1 capsule Oral Daily    sodium chloride flush  10 mL IntraVENous 2 times per day     PRN Meds: perflutren lipid microspheres, oxyCODONE-acetaminophen, heparin (porcine), albuterol sulfate HFA, cyclobenzaprine, ipratropium-albuterol, sodium chloride flush, sodium chloride, promethazine, acetaminophen **OR** acetaminophen, glucose, dextrose bolus **OR** dextrose bolus, glucagon (rDNA), dextrose      Intake/Output Summary (Last 24 hours) at 9/28/2022 0920  Last data filed at 9/28/2022 2796  Gross per 24 hour   Intake 800 ml   Output 0 ml   Net 800 ml         Physical Exam Performed:    /60   Pulse 58   Temp 97.6 °F (36.4 °C) (Oral)   Resp 17   Ht 5' 9\" (1.753 m)   Wt 236 lb 8.9 oz (107.3 kg)   SpO2 98%   BMI 34.93 kg/m²     General appearance: No apparent distress, appears stated age and cooperative. HEENT: Pupils equal, round, and reactive to light. Conjunctivae/corneas clear. Neck: Supple, with full range of motion. No jugular venous distention. Trachea midline. Respiratory:  Normal respiratory effort. Clear to auscultation, bilaterally without Rales/Wheezes/Rhonchi. Cardiovascular: Regular rate and rhythm with normal S1/S2 without murmurs, rubs or gallops. Abdomen: Soft, non-tender, non-distended with normal bowel sounds. Musculoskeletal: No clubbing, cyanosis or edema bilaterally. Full range of motion without deformity. Skin: Skin color, texture, turgor normal.  No rashes or lesions. Neurologic:  Neurovascularly intact without any focal sensory/motor deficits. Cranial nerves: II-XII intact, grossly non-focal.  Psychiatric: Alert and oriented, thought content appropriate, normal insight  Capillary Refill: Brisk,< 3 seconds   Peripheral Pulses: +2 palpable, equal bilaterally       Labs:   Recent Labs     09/27/22  0509 09/28/22  0506   WBC 5.8 6.1   HGB 10.7* 10.6*   HCT 32.7* 33.2*    251       Recent Labs     09/26/22  0451 09/27/22  0509 09/28/22  0506   * 129* 128*   K 5.0 4.7 5.2*  5.2*   CL 93* 92* 92*   CO2 21 20* 16*   BUN 63* 47* 68*   CREATININE 7.5* 6.5* 7.7*   CALCIUM 8.0* 8.2* 8.4   PHOS  --   --  8.5*       No results for input(s): AST, ALT, BILIDIR, BILITOT, ALKPHOS in the last 72 hours. No results for input(s): INR in the last 72 hours. No results for input(s): Othelia Rituley in the last 72 hours.       Urinalysis:      Lab Results   Component Value Date/Time    NITRU Negative 05/29/2022 12:51 PM    WBCUA 10-20 05/29/2022 12:51 PM    BACTERIA 3+ 05/29/2022 12:51 PM    RBCUA 5-10 05/29/2022 12:51 PM    BLOODU TRACE-INTACT 05/29/2022 12:51 PM    SPECGRAV 1.015 05/29/2022 12:51 PM    GLUCOSEU 100 05/29/2022 12:51 PM       Consults:    IP CONSULT TO HOSPITALIST  IP CONSULT TO HEART FAILURE NURSE/COORDINATOR  IP CONSULT TO DIETITIAN  IP CONSULT TO NEPHROLOGY  IP CONSULT TO CARDIOLOGY  IP CONSULT TO PALLIATIVE CARE  IP CONSULT TO PULMONOLOGY      Assessment/Plan:    Active Hospital Problems    Diagnosis     CAD (coronary artery disease) [I25.10]      Priority: High    CHF (congestive heart failure) ([facility]) [I50.9]      Priority: High    Acute respiratory failure with hypoxia and hypercapnia ([facility]) [J96.01, J96.02]      Priority: Medium    Uncontrolled hypertension [I10]      Priority: Medium    PAF (paroxysmal atrial fibrillation) ([facility]) [I48.0]     ESRD (end stage renal disease) on dialysis ([facility] [N18.6, Z99.2]     Pleural effusion on left [J90]     ZAINAB on CPAP [G47.33, Z99.89]     Obesity (BMI 30-39. 9) [E66.9]          CHF - acute on chronic combined sytolic/diastolic failure w/ reduced EF 20-25% by Echo June 2022. Likely due to hypertensive and ischemic heart disease. Continue current medical management and follow I/O as well as clinical response. W/ known CardioRenal syndrome. Cardiology consulted and appreciated. Nephrology consulted and appreciated. Acute Respiratory Failure - w/ hypoxia, POArrival, likely 2nd to above. Presence of clinical respiratory distress w/ tachypnea/dyspnea/SOB and wheezing w/ use of accessory muscles to breath. Supplemental O2 and wean as tolerated. ESRD - on HD M/W/F. Nephrology consulted and appreciated. HTN/CAD - w/ known CAD but no evidence of active signs/sxs of ischemia/failure. Currently controlled on home meds w/ vitals reviewed and documented as above. Afib - chronic paroxysmal of unspecified and clinically unable to determine etiology. Normally rate controlled on BBlocker - continued. Anticoagulated at baseline on home Eliquis due to secondary hypercoaguable state due to Afib - currently held but anticipate resuming at discharge. Monitored on tele. Left Pleural effusion: If not improving with fluid removal, can consider Thoracentesis. Diabetes Mellitus, Type 2: Controlled. Continue SSI. Monitor blood sugar and adjust regimen as needed. Diabetic (Carb-controlled) diet when able. Anxiety/Depression - controlled on home medications - continued. Obesity -  With Body mass index is 34.93 kg/m². Complicating assessment and treatment. Placing patient at risk for multiple co-morbidities as well as early death and contributing to the patient's presentation. Counseled on weight loss. ZAINAB - likely 2nd to obesity. Controlled on home CPAP/BiPap - continued, w/ outpatient f/u as previously arranged. DVT Prophylaxis: Eliquis - currently held     Recent Labs     09/27/22  0509 09/28/22  0506    251       Diet: ADULT DIET; Regular  Code Status: Full Code      PT/OT Eval Status: not yet ordered. Dispo - Patient is likely to remain in-house at least until Wed/Thurs 28/29 Sept pending clinical course and subspecialty recs after HD.        Afshan Chavez MD

## 2022-09-28 NOTE — PROGRESS NOTES
09/27/22 2337   NIV Type   Equipment Type V60   Mode Bilevel   Mask Type Full face mask   Mask Size Large   Settings/Measurements   PIP Observed 19 cm H20   IPAP 18 cmH20   CPAP/EPAP 6 cmH2O   Vt (Measured) 550 mL   Rate Ordered 14   Resp 16   FiO2  40 %   I Time/ I Time % 1 s   Minute Volume (L/min) 8.2 Liters   Mask Leak (lpm) 37 lpm   Comfort Level Good   Using Accessory Muscles No   Alarm Settings   Alarms On Y   Low Pressure (cmH2O) 6 cmH2O   High Pressure (cmH2O) 30 cmH2O   RR Low (bpm) 6   RR High (bpm) 40 br/min

## 2022-09-28 NOTE — PROGRESS NOTES
Physical Therapy  Facility/Department: [address] [place], [place] [address]  Physical Therapy Initial Assessment/Discharge     Name: Chuckie Chung  : 1970  MRN: [id number]  Date of Service: 2022    Discharge Recommendations:      PT Equipment Recommendations  Equipment Needed: No      Patient Diagnosis(es): The primary encounter diagnosis was Acute on chronic respiratory failure, unspecified whether with hypoxia or hypercapnia ([address] Diagnoses of Pleural effusion on left, Pneumonia of left lower lobe due to infectious organism, and Uncontrolled hypertension were also pertinent to this visit. Past Medical History:  has a past medical history of Ambulatory dysfunction, Aortic stenosis, Arthritis, Asthma, Bilateral hilar adenopathy syndrome, CAD (coronary artery disease), Cardiomyopathy ([address] CHF (congestive heart failure) ([address] Chronic pain, COPD (chronic obstructive pulmonary disease) ([address] Depression, Diabetes mellitus ([address] Difficult intravenous access, Emphysema of lung ([address] ESRD (end stage renal disease) on dialysis ([address] Fear of needles, Gastric ulcer, GERD (gastroesophageal reflux disease), Heart valve problem, Hemodialysis patient ([address] History of spinal fracture, Hx of blood clots, Hyperlipidemia, Hypertension, MI (myocardial infarction) ([address] Neuromuscular disorder ([address] Numbness and tingling in left arm, Pneumonia, PONV (postoperative nausea and vomiting), Prolonged emergence from general anesthesia, Sleep apnea, Stroke ([address] TIA (transient ischemic attack), and Unspecified diseases of blood and blood-forming organs. Past Surgical History:  has a past surgical history that includes Tonsillectomy; cyst removal (2013); Colonoscopy; Coronary angioplasty with stent (05/26/15); vascular surgery (aprx 2 years ago); Colonoscopy (); Upper gastrointestinal endoscopy (2016); Upper gastrointestinal endoscopy (2017); other surgical history (2017);  Dialysis fistula creation (Left, 10/30/2017); Diagnostic Cardiac Cath Lab Procedure; other surgical history (2018); other surgical history (Bilateral, 06/26/2018); pr lap insertion tunneled intraperitoneal catheter (N/A, 9/21/2018); other surgical history (Right, 09/2018); Dialysis fistula creation (Left, 3/27/2019); other surgical history (05/28/2019); Endoscopy, colon, diagnostic; Catheter Removal (Right, 7/2/2019); Aortic valve replacement (N/A, 10/15/2019); Rectal surgery (N/A, 2/24/2022); IR TUNNELED CVC PLACE WO SQ PORT/PUMP > 5 YEARS (3/21/2022); IR TUNNELED CVC PLACE WO SQ PORT/PUMP > 5 YEARS (4/21/2022); IR TUNNELED CVC PLACE WO SQ PORT/PUMP > 5 YEARS (4/26/2022); and IR TUNNELED CVC PLACE WO SQ PORT/PUMP > 5 YEARS (6/23/2022). Assessment   Assessment: Pt. admitted to [facility] with diagnosis of CHF. pt presents at baseline function and is mod I to SBA for all funcational mobility and ambulation. Pt. is safe to retutn home with 24 hr supervision initially and HHPT to improve LE strength and decrease future fall risk when deemed medically appropriate. Therapy Prognosis: Good  Decision Making: Low Complexity  No Skilled PT: At baseline function  Requires PT Follow-Up: No  Activity Tolerance  Activity Tolerance: Patient tolerated evaluation without incident; Other (comment) (Limited by motivation to participate)     Plan   Plan  Plan: Discharge with evaluation only  Safety Devices  Type of Devices: Left in bed, Call light within reach, Bed alarm in place, Patient at risk for falls, Nurse notified, Gait belt  Restraints  Restraints Initially in Place: No     Restrictions  Restrictions/Precautions  Restrictions/Precautions: General Precautions  Required Braces or Orthoses?: No     Subjective   Pain: 9/10 Low back pain chronic pain  General  Patient assessed for rehabilitation services?: Yes  Response To Previous Treatment: Not applicable  Family / Caregiver Present: No  Referring Practitioner: Sydnie Rogers MD  Referral Date : 09/28/22  General Comment  Comments: Pt. laying in bed upon arrival, resistant to eval, but agreeed to short eval with encouragment.   Subjective  Subjective: No reports of pain, agreeable to Eval with encourgament from PT/OT         Social/Functional History  Social/Functional History  Lives With: Spouse  Type of Home: Trailer (Double wide)  Home Access: Ramped entrance  Bathroom Shower/Tub: Walk-in shower  Bathroom Toilet: Standard  Bathroom Equipment: Shower chair, Grab bars in shower  Home Equipment: Natasha Gordon, Nilson Emery, [address] bed, Rollator, Lift chair (3O2 at home)  Has the patient had two or more falls in the past year or any fall with injury in the past year?: Yes (Reports hes had about 30 falls in the past year (reports he gets dizzy and/or trips))  Receives Help From: Family  ADL Assistance: Needs assistance (Wife helps with showering and Lower body dressing)  Homemaking Responsibilities: Yes  Ambulation Assistance: Needs assistance (Uses rollator)  Transfer Assistance: Independent  Active : Yes (Not supposed to but does)  Occupation: On disability  Additional Comments: Wife is home 24/7, she is on disability as well    Vision/Hearing  Vision  Vision: Impaired  Vision Exceptions: Wears glasses for reading  Hearing  Hearing: Within functional limits      Cognition   Orientation  Overall Orientation Status: Within Functional Limits  Orientation Level: Oriented to place;Oriented to situation;Oriented to person;Disoriented to time     Objective   Heart Rate: 61  Heart Rate Source: Monitor  BP: 133/76  Patient Position: Left side;Semi fowlers  MAP (Calculated): 95  Resp: 16  SpO2: 98 %  O2 Device: Nasal cannula     Observation/Palpation  Posture: Good  Gross Assessment  AROM: Within functional limits  PROM: Within functional limits  Strength: Generally decreased, functional (SLight B LE weakness, at baseline)  Sensation: Impaired (Numbness and tingling in BLE above and below knee)     Bed Mobility Training  Bed Mobility Training: Yes  Overall Level of Assistance: Modified independent (HOB elevated and use of bed rails)  Supine to Sit: Modified independent  Sit to Supine: Modified independent  Balance  Sitting: Intact  Standing: Intact  Transfer Training  Transfer Training: Yes  Overall Level of Assistance: Stand-by assistance (with RW pt refused to perform more than forward and backward walking this day.)  Sit to Stand: Stand-by assistance  Stand to Sit: Stand-by assistance  Gait Training  Gait Training: Yes  Gait  Overall Level of Assistance: Supervision  Step Length: Right shortened;Left shortened  Gait Abnormalities: Decreased step clearance  Distance (ft): 10 Feet (In room, refused to walk more)       OutComes Score     AM-PAC Score  AM-PAC Inpatient Mobility Raw Score : 22 (09/28/22 1628)  AM-PAC Inpatient T-Scale Score : 53.28 (09/28/22 1628)  Mobility Inpatient CMS 0-100% Score: 20.91 (09/28/22 1628)  Mobility Inpatient CMS G-Code Modifier : CJ (09/28/22 1628)     Goals  Short Term Goals  Time Frame for Short term goals: 9/28/22  Short term goal 1: pt to complete bed mobility with mod I. -- MET 9/28/22  Additional Goals?: No  Patient Goals   Patient goals : \"to go home\"     Education  Patient Education  Education Given To: Patient  Education Provided: Role of Therapy;Plan of Care;Transfer Training  Education Provided Comments: Pt refused CHF education  Education Method: Verbal  Barriers to Learning:  (Motivation)  Education Outcome: Verbalized understanding      Therapy Time   Individual Concurrent Group Co-treatment   Time In 1426         Time Out 1444         Minutes 18         Timed Code Treatment Minutes: 8 Minutes       Castro Sandoval PT, DPT

## 2022-09-28 NOTE — PROGRESS NOTES
[facility] Progress Note           Subjective/   45y.o. year old male who we are seeing in consultation for ESKD on HD. HPI:  Last HD on 9/26 with 3.2 liters removed, post-weight of 111.3 kg. Seen on dialysis. Orders confirmed. Pre-weight at HD today 107.3 kg. States shortness of breath persists, did not improve with HD, remains on 3 L NC.    ROS:  No chest pain, intake adequate. Objective/   GEN:  Chronically ill, /60   Pulse 58   Temp 97.6 °F (36.4 °C) (Oral)   Resp 17   Ht 5' 9\" (1.753 m)   Wt 236 lb 8.9 oz (107.3 kg)   SpO2 98%   BMI 34.93 kg/m²   HEENT: non-icteric, no JVD  CV: S1, S2 without m/r/g; no LE edema  RESP: CTA B w/o w/r/r; breathing wnl  ABD: +bs, soft, nt, no hsm  SKIN: warm, no rashes  ACCESS: Left IJ [facility]    Data/  Recent Labs     09/27/22  0509 09/28/22  0506   WBC 5.8 6.1   HGB 10.7* 10.6*   HCT 32.7* 33.2*   MCV 89.4 88.3    251       Recent Labs     09/26/22  0451 09/27/22  0509 09/28/22  0506   * 129* 128*   K 5.0 4.7 5.2*  5.2*   CL 93* 92* 92*   CO2 21 20* 16*   GLUCOSE 146* 130* 134*   PHOS  --   --  8.5*   BUN 63* 47* 68*   CREATININE 7.5* 6.5* 7.7*   LABGLOM 8* 9* 7*   GFRAA 9* 11* 9*         Assessment/     - End stage kidney disease - on HD Mon-Wed-Fri    - NSTEMI    - Hypertension    - s/p TAVR    - Left pleural effusion - moderate    - COPD/ZAINAB - requiring bi-level    - Anemia of chronic disease - DAVON with HD      Plan/     - HD per MWF schedule - ~4 L UF as tolerated with crit line monitoring to target pleural effusion, may need thoracentesis, outpatient .5 kg  - DAVON with HD   - Trend labs, bp's, serial chest x-rays    ____________________________________  Dean Sanches MD  [facility]  [email or web address]  Office: [phone]

## 2022-09-28 NOTE — CARE COORDINATION
[facility] has referral for SNF placement . They need PT/OT which order has been placed in [facility]. The therapy recommendations and assessment will determine what kind of transport they need to arrange for outpt HD and then they can start pre-cert. PT/OT pending. Will follow. He is a MWF HD  outpt at [facility].

## 2022-09-28 NOTE — PROGRESS NOTES
Shaylee 79   Daily Progress Note    Admit Date:  9/23/2022  HPI:    Chief Complaint   Patient presents with    Respiratory Distress     Squad reporting pt called c/o SOB x 3 days. Only received 45min of dialysis today due to feeling SOB. Squad reports pt was tripoding upon their arrival with a RA sat of 96%. They report patient was not moving any air. 1 duoneb and 1 albuterol given in route to the ED along with BIPAP. Interval history: Chuckie Chung is being followed for CHF, shortness of breath, elevated troponin. Subjective:  Mr. Amada Florentino seen after HD. Continues with shortness of breath.      Objective:   /60   Pulse 58   Temp 97.6 °F (36.4 °C) (Oral)   Resp 17   Ht 5' 9\" (1.753 m)   Wt 236 lb 8.9 oz (107.3 kg)   SpO2 98%   BMI 34.93 kg/m²     Intake/Output Summary (Last 24 hours) at 9/28/2022 1041  Last data filed at 9/28/2022 0434  Gross per 24 hour   Intake 1040 ml   Output 0 ml   Net 1040 ml       NYHA: IV    Physical Exam:  General:  Awake, alert, NAD, appears older than stated age,   Skin:  Warm and dry  Neck:  JVD difficult to assess   Chest:  few wheezing/ dim left base   Telemetry: not currently on tele   Cardiovascular:  RRR S1S2, no m/r/g   Abdomen:  Soft, nontender, +bowel sounds  Extremities:  no  bilateral lower extremity edema    Medications:    epoetin siddharth-epbx  5,400 Units IntraVENous Q MWF    sacubitril-valsartan  1 tablet Oral BID    insulin lispro  0-4 Units SubCUTAneous TID WC    insulin lispro  0-4 Units SubCUTAneous Nightly    [Held by provider] apixaban  5 mg Oral BID    clopidogrel  75 mg Oral Daily    DULoxetine  30 mg Oral Daily    linaclotide  145 mcg Oral QAM AC    isosorbide dinitrate  20 mg Oral TID    pantoprazole  40 mg Oral QAM AC    pravastatin  40 mg Oral Daily    tiotropium-olodaterol  2 puff Inhalation Daily    QUEtiapine  50 mg Oral Nightly    metoprolol succinate  100 mg Oral BID    docusate sodium  100 mg Oral Daily    b complex-C-folic acid  1 capsule Oral Daily    sodium chloride flush  10 mL IntraVENous 2 times per day      sodium chloride      dextrose         Lab Data:  CBC:   Recent Labs     22  0509 22  0506   WBC 5.8 6.1   HGB 10.7* 10.6*    251     BMP:    Recent Labs     22  0451 22  0509 22  0506   * 129* 128*   K 5.0 4.7 5.2*  5.2*   CO2 21 20* 16*   BUN 63* 47* 68*   CREATININE 7.5* 6.5* 7.7*     INR:  No results for input(s): INR in the last 72 hours. BNP:    Recent Labs     22   PROBNP >70,000*     Lab Results   Component Value Date    LVEF 20 2022    LVEFMODE Echo 10/16/2019       Testing:    Procedure Date:  2022  Patient Name: Yousif Fonseca  MRN: [id number]      : 1970        INDICATION      High risk abnormal stress test  Ischemic or mildly  Non-STEMI     PROCEDURES PERFORMED      Right heart catheterization  Left heart catheterization  LVgram  Coronary angiogam  Coronary cath  Monitoring of moderate conscious sedation              PROCEDURE DESCRIPTION   Risks/benefits/alternatives/outcomes were discussed with patient and/or family and informed consent was obtained. Using the [facility] scale, the patient's right radial artery was found to be a level B. Patient was prepped draped in the usual sterile fashion. Local anaesthetic was applied over puncture site. Using ultrasound guidance and a front wall technique, a 4/5 Mongolian Terumo sheath was inserted into right radial artery. Verapamil, nitroglycerin, nicardipine were administered through the sheath. Heparin was administered. Diagnostic 5 [place] pigtail, TIG, Binu catheters were used for diagnostic angiograms. At the conclusion of the procedure, a TR band was placed over the puncture site and hemostasis was obtained. There were no immediate complications. I supervised sedation from 9:53 AM to 11:08 AM with versed 5 mg/fentanyl 125 mcg during the procedure.  An independent trained observer pushed meds at my direction. We monitored the patient's level of consciousness and vital signs/physiologic status throughout the procedure duration (see times listed previously). 205 cc contrast was utilized. <20cc EBL. Case/findings/plans discussed with patient's wife, she understood/agreed. FINDINGS      HEMODYNAMICS     CHAMBER PRESSURE SATURATION   RA  10 60%   RV  47/9     PA  50/20  67%   PW  19     AORTA  101/50  95%      NANCY CARDIAC OUTPUT  5.8 L/min   SVR  756    PVR  234             LVGRAM     LVEDP  21   GRADIENT ACROSS AORTIC VALVE  less than 5 mmHg   LV FUNCTION EF 20%   WALL MOTION  severe global hypokinesis with regional variation   MITRAL REGURGITATION  mild         CORONARY ARTERIES     LM Less than 10% ymtdddnu-vkx-rivqxg stenosis            LAD Moderately calcified, 20 to 30% proximal-mid stenosis, distal vessel tapers at the apex with 60% diffuse disease. D1 has an ostial/proximal 75 to 80% stenosis. LCX  Calcified, proximal-mid 75 to 80% stenosis. Distal vessel is tortuous with 70 to 85% diffuse stenosis with more discrete stenosis noted distally. OM 1 is a very small vessel with ostial/proximal 80% stenosis and 60 to 70% diffuse disease in the lyzuvtho-cqh-rjpbkn vessel. Pink Ashwin RCA Dominant, calcified, tortuous, there is a proximal-mid stent with 60 to 70% in-stent restenosis. Distal vessel 20 to 30% stenosis         CONCLUSIONS:      Mild to moderately increased filling pressures  Patent RCA stent with moderate to borderline severe in-stent restenosis  Severe circumflex and D1 stenosis  Continue medical management, consider outpatient high risk PCI of above territories pending outpatient clinical follow-up. Currently medical management seems best given comorbidities and need for additional fluid removal.  If PCI is pursued, will likely need general anesthesia. Patient had difficulty lying still on Cath Lab table.      Okay to resume Eliquis tomorrow, case/plan/findings discussed with patient and wife, they understand/agree, they stated it is incorrectly stated in chart the patient would refuse blood, he is okay to receive blood products if needed. Continue beta-blocker and Entresto    Echo 5/29/22   Summary   Definity® used for myocardial border enhancement. Left ventricular systolic function is severely reduced with a visually   estimated ejection fraction of 20-25 %. EF estimated by Jasmine's method at 24 %. The left ventricle is mildly dilated in size with normal wall thickness. Severe global hypokinesis with regional variation. Grade I diastolic dysfunction with normal LV pressure. There is no evidence of a left ventricular thrombus. Right ventricular systolic function is reduced. A 29 mm Langford Leyda S3 bioprosthetic aortic valve appears well seated   with normal Doppler values. Inadequate tricuspid valve regurgitation to estimate systolic pulmonary   artery pressure. There is a moderate left pleural effusion noted. Echo 9/28/22   Summary   Patient refused use of Definity contrast.   Limited only f/u for LVEF and s/p TAVR. The left ventricular systolic function appears severely reduced with   visually estimated ejection fraction of 20-25%. Poor endocardial visualization. By history, there is a #29 mm Langford Leyda S3 bioprosthetic valve in the   aortic position. The valve appears well seated with no rocking motion. Valve   leaflets are not well-evaluated. Normal Doppler values. No evidence of aortic valve regurgitation. Pericardial effusion, noted darrick-apically where measures up to 1.5 cm,   anteriorly along right ventricle, with exudate. Appears likely trace along   lateral wall. Difficult to discern apically/laterally however, whether   pericardial or tracking pleural effusion. No tamponade physiology. There is a large left pleural effusion.       Non-contrasted CT is suggested for further characterization of pericardial   versus pleural effusion. Principal Problem:    CHF (congestive heart failure) ([facility])  Active Problems:    CAD (coronary artery disease)    Uncontrolled hypertension    Acute respiratory failure with hypoxia and hypercapnia ([facility])    Obesity (BMI 30-39.9)    ZAINAB on CPAP    Pleural effusion on left    ESRD (end stage renal disease) on dialysis (HCC)    PAF (paroxysmal atrial fibrillation) ([facility] [address]  Resolved Problems:    * No resolved hospital problems. *      Assessment:  Shortness of breath  ELevated troponin- chronically elevated    PAF  Multivessel CAD; CAD s/p PCI to LAD in 2015, RCA 1/2022   - severe LCx and D1 stenosis 6/2022 - med mgmt   AS S/P TAVR 2019  Ishcemic cardiomyopathy  HTN, DM, HLD  PAD s/p PTA right iliac artery  Left pleural effusion   ESRD on HD  ZAINAB   Hx of CVA      Plan:  Limited echo ordered   Consult pulmonary for evaluation of left pleural effusion. Will hold plavix for anticipation of a possible thoracentesis vs pericardiocentesis   Add baby aspirin in the interim while holding plavix and eliquis (discussed with dr. Efren Vivas)   Volume control HD  Toprol 100mg BID  Entresto 24-26mg BID  Isordil 20mg TID  Eliquis on hold for possible procedures    Chest CT without contrast to further evaluate pericardial effusion vs pleural effusion. Education provided today Disease process and medications discussed. Questions answered fully.   Total Time spent educating today 10 minutes     JAY Kuhn - CNP,  9/28/2022, 2:38 PM

## 2022-09-29 LAB
ALBUMIN SERPL-MCNC: 4.1 G/DL (ref 3.4–5)
ANION GAP SERPL CALCULATED.3IONS-SCNC: 12 MMOL/L (ref 3–16)
BUN BLDV-MCNC: 51 MG/DL (ref 7–20)
CALCIUM SERPL-MCNC: 8.8 MG/DL (ref 8.3–10.6)
CHLORIDE BLD-SCNC: 91 MMOL/L (ref 99–110)
CO2: 26 MMOL/L (ref 21–32)
CREAT SERPL-MCNC: 5.7 MG/DL (ref 0.9–1.3)
GFR AFRICAN AMERICAN: 13
GFR NON-AFRICAN AMERICAN: 10
GLUCOSE BLD-MCNC: 148 MG/DL (ref 70–99)
GLUCOSE BLD-MCNC: 150 MG/DL (ref 70–99)
GLUCOSE BLD-MCNC: 165 MG/DL (ref 70–99)
GLUCOSE BLD-MCNC: 166 MG/DL (ref 70–99)
GLUCOSE BLD-MCNC: 168 MG/DL (ref 70–99)
GLUCOSE BLD-MCNC: 175 MG/DL (ref 70–99)
HCT VFR BLD CALC: 35.9 % (ref 40.5–52.5)
HEMOGLOBIN: 11.3 G/DL (ref 13.5–17.5)
MAGNESIUM: 2 MG/DL (ref 1.8–2.4)
MCH RBC QN AUTO: 28.1 PG (ref 26–34)
MCHC RBC AUTO-ENTMCNC: 31.5 G/DL (ref 31–36)
MCV RBC AUTO: 89 FL (ref 80–100)
PDW BLD-RTO: 15.9 % (ref 12.4–15.4)
PERFORMED ON: ABNORMAL
PHOSPHORUS: 6.1 MG/DL (ref 2.5–4.9)
PLATELET # BLD: 300 K/UL (ref 135–450)
PMV BLD AUTO: 8.6 FL (ref 5–10.5)
POTASSIUM REFLEX MAGNESIUM: 5.4 MMOL/L (ref 3.5–5.1)
POTASSIUM SERPL-SCNC: 5.4 MMOL/L (ref 3.5–5.1)
PRO-BNP: ABNORMAL PG/ML (ref 0–124)
RBC # BLD: 4.03 M/UL (ref 4.2–5.9)
SODIUM BLD-SCNC: 129 MMOL/L (ref 136–145)
WBC # BLD: 9 K/UL (ref 4–11)

## 2022-09-29 PROCEDURE — 6370000000 HC RX 637 (ALT 250 FOR IP): Performed by: NURSE PRACTITIONER

## 2022-09-29 PROCEDURE — 85027 COMPLETE CBC AUTOMATED: CPT

## 2022-09-29 PROCEDURE — 83735 ASSAY OF MAGNESIUM: CPT

## 2022-09-29 PROCEDURE — 6370000000 HC RX 637 (ALT 250 FOR IP): Performed by: INTERNAL MEDICINE

## 2022-09-29 PROCEDURE — 2700000000 HC OXYGEN THERAPY PER DAY

## 2022-09-29 PROCEDURE — 99232 SBSQ HOSP IP/OBS MODERATE 35: CPT | Performed by: INTERNAL MEDICINE

## 2022-09-29 PROCEDURE — 2060000000 HC ICU INTERMEDIATE R&B

## 2022-09-29 PROCEDURE — 83880 ASSAY OF NATRIURETIC PEPTIDE: CPT

## 2022-09-29 PROCEDURE — 94640 AIRWAY INHALATION TREATMENT: CPT

## 2022-09-29 PROCEDURE — 99233 SBSQ HOSP IP/OBS HIGH 50: CPT | Performed by: NURSE PRACTITIONER

## 2022-09-29 PROCEDURE — 80069 RENAL FUNCTION PANEL: CPT

## 2022-09-29 PROCEDURE — 94761 N-INVAS EAR/PLS OXIMETRY MLT: CPT

## 2022-09-29 PROCEDURE — 36415 COLL VENOUS BLD VENIPUNCTURE: CPT

## 2022-09-29 RX ORDER — CALCIUM CARBONATE 200(500)MG
500 TABLET,CHEWABLE ORAL 3 TIMES DAILY PRN
Status: DISCONTINUED | OUTPATIENT
Start: 2022-09-29 | End: 2022-10-05 | Stop reason: HOSPADM

## 2022-09-29 RX ADMIN — SACUBITRIL AND VALSARTAN 1 TABLET: 24; 26 TABLET, FILM COATED ORAL at 09:05

## 2022-09-29 RX ADMIN — PRAVASTATIN SODIUM 40 MG: 40 TABLET ORAL at 09:08

## 2022-09-29 RX ADMIN — METOPROLOL SUCCINATE 100 MG: 50 TABLET, EXTENDED RELEASE ORAL at 09:05

## 2022-09-29 RX ADMIN — OXYCODONE AND ACETAMINOPHEN 1 TABLET: 7.5; 325 TABLET ORAL at 15:58

## 2022-09-29 RX ADMIN — ANTACID TABLETS 500 MG: 500 TABLET, CHEWABLE ORAL at 11:45

## 2022-09-29 RX ADMIN — ISOSORBIDE DINITRATE 20 MG: 20 TABLET ORAL at 22:17

## 2022-09-29 RX ADMIN — OXYCODONE AND ACETAMINOPHEN 1 TABLET: 7.5; 325 TABLET ORAL at 09:05

## 2022-09-29 RX ADMIN — SODIUM ZIRCONIUM CYCLOSILICATE 10 G: 10 POWDER, FOR SUSPENSION ORAL at 11:45

## 2022-09-29 RX ADMIN — NEPHROCAP 1 MG: 1 CAP ORAL at 09:05

## 2022-09-29 RX ADMIN — ASPIRIN 81 MG 81 MG: 81 TABLET ORAL at 09:05

## 2022-09-29 RX ADMIN — PANTOPRAZOLE SODIUM 40 MG: 40 TABLET, DELAYED RELEASE ORAL at 06:40

## 2022-09-29 RX ADMIN — TIOTROPIUM BROMIDE AND OLODATEROL 2 PUFF: 3.124; 2.736 SPRAY, METERED RESPIRATORY (INHALATION) at 08:18

## 2022-09-29 RX ADMIN — METOPROLOL SUCCINATE 100 MG: 50 TABLET, EXTENDED RELEASE ORAL at 22:17

## 2022-09-29 RX ADMIN — ISOSORBIDE DINITRATE 20 MG: 20 TABLET ORAL at 09:05

## 2022-09-29 RX ADMIN — DULOXETINE HYDROCHLORIDE 30 MG: 30 CAPSULE, DELAYED RELEASE ORAL at 09:05

## 2022-09-29 RX ADMIN — QUETIAPINE FUMARATE 50 MG: 25 TABLET ORAL at 22:17

## 2022-09-29 RX ADMIN — PROMETHAZINE HYDROCHLORIDE 12.5 MG: 25 TABLET ORAL at 12:12

## 2022-09-29 RX ADMIN — ISOSORBIDE DINITRATE 20 MG: 20 TABLET ORAL at 15:54

## 2022-09-29 RX ADMIN — OXYCODONE AND ACETAMINOPHEN 1 TABLET: 7.5; 325 TABLET ORAL at 22:17

## 2022-09-29 ASSESSMENT — PAIN DESCRIPTION - DESCRIPTORS
DESCRIPTORS: ACHING
DESCRIPTORS: ACHING

## 2022-09-29 ASSESSMENT — PAIN DESCRIPTION - LOCATION
LOCATION: BACK;HIP
LOCATION: BACK;HIP

## 2022-09-29 ASSESSMENT — PAIN SCALES - GENERAL
PAINLEVEL_OUTOF10: 9
PAINLEVEL_OUTOF10: 10

## 2022-09-29 NOTE — PROGRESS NOTES
INPATIENT PULMONARY CRITICAL CARE PROGRESS NOTE      Reason for visit    Left pleural effusion     SUBJECTIVE: Patient when seen this morning was sitting in the bed without any significant shortness of breath, patient was not have any chest pain, patient was afebrile and hemodynamically maintained, patient was on 3 L of nasal cannula oxygen saturating 95%, patient has sinus rhythm on the monitor, patient's was having slightly elevated blood sugars, patient was alert and oriented, no other pertinent review of system of concern         Physical Exam:  Blood pressure 112/61, pulse 56, temperature 97.8 °F (36.6 °C), temperature source Oral, resp. rate 16, height 5' 9\" (1.753 m), weight 238 lb 1.6 oz (108 kg), SpO2 99 %.'     Constitutional: No respiratory distress   HENT: No facial asymmetry no thyromegaly. Eyes:  Conjunctivae are normal. Pupils equal, round, and reactive to light. No scleral icterus. Neck: . No tracheal deviation present. No obvious thyroid mass. Short large neck  Cardiovascular: Normal rate, regular rhythm, normal heart sounds. No right ventricular heave. some lower extremity edema. Pulmonary/Chest: No wheezes. Bilateral rales. Chest wall is not dull to percussion. Some accessory muscle usage or stridor. Decreased breath sound intensity(L>R)  Abdominal: Soft. Bowel sounds present. No distension or hernia. No tenderness. Obese  Musculoskeletal: No cyanosis. No clubbing. No obvious joint deformity. Lymphadenopathy: No cervical or supraclavicular adenopathy. Skin: Skin is warm and dry. No rash or nodules on the exposed extremities.   Psychiatric: Normal reaction ;no anxiety   Neurologic Alert and communicative with no focal cranial deficits    Results:  CBC:   Recent Labs     09/27/22  0509 09/28/22  0506 09/29/22  0754   WBC 5.8 6.1 9.0   HGB 10.7* 10.6* 11.3*   HCT 32.7* 33.2* 35.9*   MCV 89.4 88.3 89.0    251 300     BMP:   Recent Labs     09/27/22  0509 09/28/22  0506 09/29/22  0754   * 128* 129*   K 4.7 5.2*  5.2* 5.4*   CL 92* 92* 91*   CO2 20* 16* 26   PHOS  --  8.5* 6.1*   BUN 47* 68* 51*   CREATININE 6.5* 7.7* 5.7*         Imaging:  I have reviewed radiology images personally. CT CHEST WO CONTRAST   Final Result   1. Increased size of a large left pleural effusion with adjacent   consolidation in the left upper and lower lobes, particularly the lower lobe. Airspace consolidation could represent atelectasis related to the effusion or   pneumonia. 2. Stable 3 mm left upper lobe lung nodule. If patient is considered high   risk, a 1 year follow-up chest CT is suggested. RECOMMENDATIONS:   Lung nodule Fleischner Society guidelines for follow-up and management of   incidentally detected pulmonary nodules:      Single Solid Nodule:      ~Nodule size less than 6 mm. In a low-risk patient, no routine follow-up. In a high-risk patient, optional CT at 12 months. Radiology 2017 http://pubs. rsna.org/doi/full/10.1148/radiol. 3873326445         XR CHEST (2 VW)   Final Result   1. Diffuse opacity lower 2/3 left hemithorax obscuring the left hilum, left   heart margin and left hemidiaphragm slightly increased since prior study in   keeping with consolidation and pleural effusion. 2. Right lung appears clear. 3. Stable left IJ hemodialysis catheter, tip at the superior cavoatrial   junction level. XR CHEST PORTABLE   Final Result   Moderate left pleural effusion with associated left lower lobe airspace   opacities.            Echo Limited    Result Date: 9/28/2022  Transthoracic Echocardiography Report (TTE)  Demographics   Patient Name       Piotr Hein   Date of Study      09/28/2022         Gender               Male   Patient Number     [id number]         Date of Birth        1970   Visit Number       [id number]          Age                  45 year(s)   Accession Number   [id number]         Room Number          3168   Corporate ID [id number]            Sonographer          Isma Lewis, RT   Ordering Physician Jayla Reid   Interpreting         [facility]             Physician            Carl Riddle MD  Procedure Type of Study   TTE procedure:ECHOCARDIOGRAM LIMITED. Procedure Date Date: 09/28/2022 Start: 07:01 AM Study Location: [address] [place] [address] - Echo Lab Technical Quality: Adequate visualization Indications:Dyspnea/SOB. Patient Status: Routine Height: 69 inches Weight: 235.01 pounds BSA: 2.21 m2 BMI: 34.7 kg/m2 BP: 101/60 mmHg  Conclusions   Summary  Patient refused use of Definity contrast.  Limited only f/u for LVEF and s/p TAVR. The left ventricular systolic function appears severely reduced with  visually estimated ejection fraction of 20-25%. Poor endocardial visualization. By history, there is a #29 mm Langford Leyda S3 bioprosthetic valve in the  aortic position. The valve appears well seated with no rocking motion. Valve  leaflets are not well-evaluated. Normal Doppler values. No evidence of aortic valve regurgitation. Pericardial effusion, noted darrick-apically where measures up to 1.5 cm,  anteriorly along right ventricle, with exudate. Appears likely trace along  lateral wall. Difficult to discern apically/laterally however, whether  pericardial or tracking pleural effusion. No tamponade physiology. There is a large left pleural effusion. Non-contrasted CT is suggested for further characterization of pericardial  versus pleural effusion. Signature   ------------------------------------------------------------------  Electronically signed by Carl Riddle MD (Interpreting  physician) on 09/28/2022 at 11:29 AM  ------------------------------------------------------------------   Findings   Left Ventricle  The left ventricular systolic function appears severely reduced with  visually estimated ejection fraction of 20-25%. Poor endocardial visualization.    Aortic Valve  By history, there is a #29 mm Langford Leyda S3 bioprosthetic valve in the  aortic position. The valve appears well seated with no rocking motion. Valve  leaflets are not well-evaluated. Normal Doppler values. There is a maximum gradient of 18 mmHg, a mean gradient of 8 mmHg, and the  valve area is calculated at 2.13 cm2 by the continuity equation. No evidence of aortic valve regurgitation. Pericardial Effusion  Pericardial effusion, noted darrick-apically where measures up to 1.5 cm,  anteriorly along right ventricle, with exudate. Appears likely trace along  lateral wall. Difficult to discern apically/laterally however, whether  pericardial or tracking pleural effusion. No IVC plethora, chamber collapse,  or respiration flow variation. Pleural Effusion  There is a large left pleural effusion. Miscellaneous  IVC size is normal (<2.1cm) and collapses > 50% with respiration consistent  with normal RA pressure (3mmHg). M-Mode/2D Measurements (cm)                AO Root Dimension: 2.2 cm               AV Cusp Separation: 1.5 cm  LVOT: 2.6 cm  Doppler Measurements   AV Peak Velocity: 211 cm/s  AV Peak Gradient: 17.81 mmHg  AV Mean Gradient: 8 mmHg  LVOT Peak Velocity: 83.6 cm/s  AV Area (Continuity):2.26 cm2   Aortic Valve   Peak Velocity: 211 cm/s     Mean Velocity: 129 cm/s  Peak Gradient: 17.81 mmHg   Mean Gradient: 8 mmHg  Area (continuity): 2.26 cm2  AV VTI: 42.3 cm   Cusp Separation: 1.5 cm  Aorta   Aortic Root: 2.2 cm  LVOT Diameter: 2.6 cm      XR CHEST (2 VW)    Result Date: 9/27/2022  EXAMINATION: TWO XRAY VIEWS OF THE CHEST 9/27/2022 3:17 pm COMPARISON: Chest 09/23/2022 HISTORY: ORDERING SYSTEM PROVIDED HISTORY: evaluate left pleural effusion Reason for Exam: f/u FINDINGS: *4 images are presented. *Diffuse opacity lower 2/3 left hemithorax obscuring the left hilum, left heart margin and left hemidiaphragm slightly increased since prior study in keeping with consolidation and pleural effusion.  *Right lung appears clear. *Stable left IJ hemodialysis catheter, tip at the superior cavoatrial junction level. *No acute osseous abnormality. *TAVR. 1. Diffuse opacity lower 2/3 left hemithorax obscuring the left hilum, left heart margin and left hemidiaphragm slightly increased since prior study in keeping with consolidation and pleural effusion. 2. Right lung appears clear. 3. Stable left IJ hemodialysis catheter, tip at the superior cavoatrial junction level. CT CHEST WO CONTRAST    Result Date: 9/28/2022  EXAMINATION: CT OF THE CHEST WITHOUT CONTRAST 9/28/2022 2:16 pm TECHNIQUE: CT of the chest was performed without the administration of intravenous contrast. Multiplanar reformatted images are provided for review. Automated exposure control, iterative reconstruction, and/or weight based adjustment of the mA/kV was utilized to reduce the radiation dose to as low as reasonably achievable. COMPARISON: Radiograph 09/27/2022 and CT 05/29/2022 HISTORY: ORDERING SYSTEM PROVIDED HISTORY: evaluate pericardial effusion and left pleural effusion TECHNOLOGIST PROVIDED HISTORY: Reason for exam:->evaluate pericardial effusion and left pleural effusion Reason for Exam: SOB, MID CHEST PRESSURE Relevant Medical/Surgical History: COPD, DIABETES FINDINGS: Mediastinum: Of precarinal lymph node measures 1.1 cm versus 2 cm previously. No enlarged lymph nodes are noted elsewhere. The heart is not enlarged. There is no pericardial effusion. There has been an aortic valve replacement. There are advanced coronary arterial calcifications. Lungs/pleura: There is a large left pleural effusion which is larger than before with adjacent consolidation in the left upper and lower lobes. This involves the lower lobes the greatest degree. A linear opacity in the right lower lobe likely represents atelectasis or scarring. The central airways are normally patent. Left internal jugular venous catheter extends to the cavoatrial junction.   A 3 mm left upper lobe nodule is stable. Upper Abdomen: There has been a cholecystectomy. The upper abdomen is otherwise unremarkable. Soft Tissues/Bones: No destructive bone lesion is identified. 1. Increased size of a large left pleural effusion with adjacent consolidation in the left upper and lower lobes, particularly the lower lobe. Airspace consolidation could represent atelectasis related to the effusion or pneumonia. 2. Stable 3 mm left upper lobe lung nodule. If patient is considered high risk, a 1 year follow-up chest CT is suggested. RECOMMENDATIONS: Lung nodule Fleischner Society guidelines for follow-up and management of incidentally detected pulmonary nodules: Single Solid Nodule: ~Nodule size less than 6 mm. In a low-risk patient, no routine follow-up. In a high-risk patient, optional CT at 12 months. Radiology 2017 http://pubs. rsna.org/doi/full/10.1148/radiol. 2322581462     XR CHEST PORTABLE    Result Date: 9/23/2022  EXAMINATION: ONE XRAY VIEW OF THE CHEST 9/23/2022 4:44 pm COMPARISON: Chest x-ray dated 2 August 2022 HISTORY: ORDERING SYSTEM PROVIDED HISTORY: SOB, decreased sounds on Left TECHNOLOGIST PROVIDED HISTORY: Reason for exam:->SOB, decreased sounds on Left Reason for Exam: sob FINDINGS: Mild cardiomegaly with left-sided central venous catheter with the tip in the superior vena cava. The right lung is clear. Stable appearance of the left lung with moderate left pleural effusion and left lower lobe airspace opacities. Moderate left pleural effusion with associated left lower lobe airspace opacities.              Assessment:  Principal Problem:    CHF (congestive heart failure) (HCC)  Active Problems:    CAD (coronary artery disease)    Asthma-COPD overlap syndrome (HCC)    Uncontrolled hypertension    History of transcatheter aortic valve replacement (TAVR)    Cardiomyopathy (HCC)    SOB (shortness of breath)    Pericardial effusion    Obesity (BMI 30-39.9)    ZAINAB on CPAP    Pleural effusion on left    ESRD (end stage renal disease) on dialysis ([address]    PAF (paroxysmal atrial fibrillation) ([facility] [address]  Resolved Problems:    * No resolved hospital problems.  *          Plan:   Oxygen supplementation  to keep saturation being 90-94% only  Please titrate down the oxygen as per the above parameters  Home CPAP on intermittent basis  Patient's acid-base balance is maintained and does not need any change or escalation   Pulmonary toilet  Patient's x-ray chest shows patient to have mild pulmonary venous congestion and left pleural effusion   Patient does not need any antibiotics from pulmonary standpoint of view  HD as per nephrology  Patient has large pleural effusion which may be contributing to patient's symptoms  Patient's Eliquis is on hold   Was informed with the nursing that patient's Plavix was not discontinued on 24th of this month but instead it was stopped only on 27th of this month and for that reason further changes may have to be postponed with Plavix being stopped for 5 days-discussed with patient also who understands  Antihypertensives as per IM/nephrology   Patient may need dry weight to be modified-nephrology to decide upon that   Optimization of cardiac medications as per cardiology  Bronchodilators  No need for  any systemic steroids at this time  Blood glucose monitoring with sliding scale insulin  May need long acting insulin -as per IM   Trend hemodynamics and cardiac rhythm closely  Diet and life style modifications  PUD prophylaxis      Case d/w patient and nursing             Electronically signed by:  Jessa Arellano MD    9/29/2022    9:38 AM.

## 2022-09-29 NOTE — PROGRESS NOTES
09/28/22 2201   NIV Type   Skin Assessment Clean, dry, & intact   Skin Protection for O2 Device No   NIV Started/Stopped On   Equipment Type v60   Mode Bilevel   Mask Type Full face mask   Mask Size Large   Settings/Measurements   PIP Observed 20 cm H20   IPAP 18 cmH20   CPAP/EPAP 6 cmH2O   Vt (Measured) 536 mL   Rate Ordered 20   Resp 14   FiO2  40 %   I Time/ I Time % 1 s   Minute Volume (L/min) 8.4 Liters   Mask Leak (lpm) 42 lpm  (patient has a beard)   Comfort Level Good   Using Accessory Muscles No   Patient's Home Machine No   Oxygen Therapy/Pulse Ox   O2 Therapy Oxygen   O2 Device PAP (positive airway pressure)

## 2022-09-29 NOTE — PROGRESS NOTES
[address]   Daily Progress Note    Admit Date:  9/23/2022  HPI:    Chief Complaint   Patient presents with    Respiratory Distress     Squad reporting pt called c/o SOB x 3 days. Only received 45min of dialysis today due to feeling SOB. Squad reports pt was tripoding upon their arrival with a RA sat of 96%. They report patient was not moving any air. 1 duoneb and 1 albuterol given in route to the ED along with BIPAP. Interval history: Dafne Bravo is being followed for CHF, shortness of breath, elevated troponin. Subjective:  Mr. Annette Garcia having intermittent Shortness of breath. Feels better with laying on his left side. Chest CT completed and shows no pericardial effusion, but shows large left pleural effusion. Objective:   /64   Pulse 59   Temp 97.6 °F (36.4 °C) (Oral)   Resp 16   Ht 5' 9\" (1.753 m)   Wt 238 lb 1.6 oz (108 kg)   SpO2 99%   BMI 35.16 kg/m²     Intake/Output Summary (Last 24 hours) at 9/29/2022 0701  Last data filed at 9/28/2022 2148  Gross per 24 hour   Intake 420 ml   Output 3885 ml   Net -3465 ml       NYHA: IV    Physical Exam:  General:  Awake, alert, NAD, appears older than stated age,   Skin:  Warm and dry  Neck:  JVD difficult to assess   Chest: no wheezing right side, very diminished left side.    Telemetry: not currently on tele   Cardiovascular:  RRR S1S2, no m/r/g   Abdomen:  Soft, nontender, +bowel sounds  Extremities:  no  bilateral lower extremity edema    Medications:    aspirin  81 mg Oral Daily    epoetin siddharth-epbx  5,400 Units IntraVENous Q MWF    sacubitril-valsartan  1 tablet Oral BID    insulin lispro  0-4 Units SubCUTAneous TID WC    insulin lispro  0-4 Units SubCUTAneous Nightly    [Held by provider] apixaban  5 mg Oral BID    [Held by provider] clopidogrel  75 mg Oral Daily    DULoxetine  30 mg Oral Daily    linaclotide  145 mcg Oral QAM AC    isosorbide dinitrate  20 mg Oral TID    pantoprazole  40 mg Oral QAM AC    pravastatin  40 mg Oral Daily    tiotropium-olodaterol  2 puff Inhalation Daily    QUEtiapine  50 mg Oral Nightly    metoprolol succinate  100 mg Oral BID    docusate sodium  100 mg Oral Daily    b complex-C-folic acid  1 capsule Oral Daily    sodium chloride flush  10 mL IntraVENous 2 times per day      sodium chloride      dextrose         Lab Data:  CBC:   Recent Labs     22  0509 22  0506   WBC 5.8 6.1   HGB 10.7* 10.6*    251     BMP:    Recent Labs     22  0509 22  0506   * 128*   K 4.7 5.2*  5.2*   CO2 20* 16*   BUN 47* 68*   CREATININE 6.5* 7.7*     INR:  No results for input(s): INR in the last 72 hours. BNP:    No results for input(s): PROBNP in the last 72 hours. Lab Results   Component Value Date    LVEF 20 2022    LVEFMODE Echo 10/16/2019       Testing:    Procedure Date:  2022  Patient Name: Lawana Collet  MRN: [id number]      : 1970        INDICATION      High risk abnormal stress test  Ischemic or mildly  Non-STEMI     PROCEDURES PERFORMED      Right heart catheterization  Left heart catheterization  LVgram  Coronary angiogam  Coronary cath  Monitoring of moderate conscious sedation              PROCEDURE DESCRIPTION   Risks/benefits/alternatives/outcomes were discussed with patient and/or family and informed consent was obtained. Using the [facility] scale, the patient's right radial artery was found to be a level B. Patient was prepped draped in the usual sterile fashion. Local anaesthetic was applied over puncture site. Using ultrasound guidance and a front wall technique, a 4/5 Citizen of [place] Terumo sheath was inserted into right radial artery. Verapamil, nitroglycerin, nicardipine were administered through the sheath. Heparin was administered. Diagnostic 5 [place] pigtail, TIG, Binu catheters were used for diagnostic angiograms.   At the conclusion of the procedure, a TR band was placed over the puncture site and hemostasis was obtained. There were no immediate complications. I supervised sedation from 9:53 AM to 11:08 AM with versed 5 mg/fentanyl 125 mcg during the procedure. An independent trained observer pushed meds at my direction. We monitored the patient's level of consciousness and vital signs/physiologic status throughout the procedure duration (see times listed previously). 205 cc contrast was utilized. <20cc EBL. Case/findings/plans discussed with patient's wife, she understood/agreed. FINDINGS      HEMODYNAMICS     CHAMBER PRESSURE SATURATION   RA  10 60%   RV  47/9     PA  50/20  67%   PW  19     AORTA  101/50  95%      NANCY CARDIAC OUTPUT  5.8 L/min   SVR  756    PVR  234             LVGRAM     LVEDP  21   GRADIENT ACROSS AORTIC VALVE  less than 5 mmHg   LV FUNCTION EF 20%   WALL MOTION  severe global hypokinesis with regional variation   MITRAL REGURGITATION  mild         CORONARY ARTERIES     LM Less than 10% ylelazsp-gfb-przhgc stenosis            LAD Moderately calcified, 20 to 30% proximal-mid stenosis, distal vessel tapers at the apex with 60% diffuse disease. D1 has an ostial/proximal 75 to 80% stenosis. LCX  Calcified, proximal-mid 75 to 80% stenosis. Distal vessel is tortuous with 70 to 85% diffuse stenosis with more discrete stenosis noted distally. OM 1 is a very small vessel with ostial/proximal 80% stenosis and 60 to 70% diffuse disease in the jtlkonhr-wgm-omliiw vessel. Martha Ceron RCA Dominant, calcified, tortuous, there is a proximal-mid stent with 60 to 70% in-stent restenosis. Distal vessel 20 to 30% stenosis         CONCLUSIONS:      Mild to moderately increased filling pressures  Patent RCA stent with moderate to borderline severe in-stent restenosis  Severe circumflex and D1 stenosis  Continue medical management, consider outpatient high risk PCI of above territories pending outpatient clinical follow-up.   Currently medical management seems best given comorbidities and need for additional fluid removal.  If PCI is pursued, will likely need general anesthesia. Patient had difficulty lying still on Cath Lab table. Okay to resume Eliquis tomorrow, case/plan/findings discussed with patient and wife, they understand/agree, they stated it is incorrectly stated in chart the patient would refuse blood, he is okay to receive blood products if needed. Continue beta-blocker and Entresto    Echo 5/29/22   Summary   Definity® used for myocardial border enhancement. Left ventricular systolic function is severely reduced with a visually   estimated ejection fraction of 20-25 %. EF estimated by Jasmine's method at 24 %. The left ventricle is mildly dilated in size with normal wall thickness. Severe global hypokinesis with regional variation. Grade I diastolic dysfunction with normal LV pressure. There is no evidence of a left ventricular thrombus. Right ventricular systolic function is reduced. A 29 mm Langford Leyda S3 bioprosthetic aortic valve appears well seated   with normal Doppler values. Inadequate tricuspid valve regurgitation to estimate systolic pulmonary   artery pressure. There is a moderate left pleural effusion noted. Echo 9/28/22   Summary   Patient refused use of Definity contrast.   Limited only f/u for LVEF and s/p TAVR. The left ventricular systolic function appears severely reduced with   visually estimated ejection fraction of 20-25%. Poor endocardial visualization. By history, there is a #29 mm Langford Leyda S3 bioprosthetic valve in the   aortic position. The valve appears well seated with no rocking motion. Valve   leaflets are not well-evaluated. Normal Doppler values. No evidence of aortic valve regurgitation. Pericardial effusion, noted darrick-apically where measures up to 1.5 cm,   anteriorly along right ventricle, with exudate. Appears likely trace along   lateral wall.  Difficult to discern apically/laterally however, whether   pericardial or tracking pleural effusion. No tamponade physiology. There is a large left pleural effusion. Non-contrasted CT is suggested for further characterization of pericardial   versus pleural effusion. CHest CT 9/28/22   Impression:       1. Increased size of a large left pleural effusion with adjacent   consolidation in the left upper and lower lobes, particularly the lower lobe. Airspace consolidation could represent atelectasis related to the effusion or   pneumonia. 2. Stable 3 mm left upper lobe lung nodule. If patient is considered high   risk, a 1 year follow-up chest CT is suggested. Principal Problem:    CHF (congestive heart failure) ([facility])  Active Problems:    CAD (coronary artery disease)    Asthma-COPD overlap syndrome (HCC)    Uncontrolled hypertension    History of transcatheter aortic valve replacement (TAVR)    Cardiomyopathy (HCC)    SOB (shortness of breath)    Pericardial effusion    Obesity (BMI 30-39.9)    ZAINAB on CPAP    Pleural effusion on left    ESRD (end stage renal disease) on dialysis (HCC)    PAF (paroxysmal atrial fibrillation) ([address]  Resolved Problems:    * No resolved hospital problems. *      Assessment:  Shortness of breath  ELevated troponin- chronically elevated    PAF  Multivessel CAD; CAD s/p PCI to LAD in 2015, RCA 1/2022   - severe LCx and D1 stenosis 6/2022 - med mgmt   AS S/P TAVR 2019  Ishcemic cardiomyopathy  HTN, DM, HLD  PAD s/p PTA right iliac artery  Left pleural effusion - large with consolidation   ESRD on HD  ZAINAB   Hx of CVA      Plan: Will hold plavix for anticipation of a possible thoracentesis   Add baby aspirin in the interim while holding plavix (held since 9/27/22) and eliquis (discussed with dr. Denny Garcia)   Volume control HD  Toprol 100mg BID  Entresto 24-26mg BID  Isordil 20mg TID  Eliquis on hold for possible procedures    Appreciate recs from pulmonary; I discussed the CT scan results with the patient.      Education provided today Disease process and medications discussed. Questions answered fully.   Total Time spent educating today 15 minutes     JAY Mera CNP,  9/29/2022, 9:45 AM

## 2022-09-29 NOTE — CARE COORDINATION
PT/OT rec's for home with home PT/OT however patient is wanting to go to S. They are attempting pre-cert with [facility] and working on arranging his transport to outpt HD at 's. However per Margaret in admissions if [employer] denies skilled rehab level of care they will take under intermediate level of care under his [facility]. If patient changes his mind and decides to go home can arrange home care if agreeable. Patient needs thoracentesis when has been off plavix appropriate amount of time. Plavix held since 9/24/22.

## 2022-09-29 NOTE — PROGRESS NOTES
Hospitalist Progress Note      PCP: Tommy Mccurdy MD    Date of Admission: 9/23/2022    Chief Complaint: SOB    Subjective: no new c/o. Seen in HD 28 Sept      Medications:  Reviewed    Infusion Medications    sodium chloride      dextrose       Scheduled Medications    aspirin  81 mg Oral Daily    epoetin siddharth-epbx  5,400 Units IntraVENous Q MWF    sacubitril-valsartan  1 tablet Oral BID    insulin lispro  0-4 Units SubCUTAneous TID WC    insulin lispro  0-4 Units SubCUTAneous Nightly    [Held by provider] apixaban  5 mg Oral BID    [Held by provider] clopidogrel  75 mg Oral Daily    DULoxetine  30 mg Oral Daily    linaclotide  145 mcg Oral QAM AC    isosorbide dinitrate  20 mg Oral TID    pantoprazole  40 mg Oral QAM AC    pravastatin  40 mg Oral Daily    tiotropium-olodaterol  2 puff Inhalation Daily    QUEtiapine  50 mg Oral Nightly    metoprolol succinate  100 mg Oral BID    docusate sodium  100 mg Oral Daily    b complex-C-folic acid  1 capsule Oral Daily    sodium chloride flush  10 mL IntraVENous 2 times per day     PRN Meds: perflutren lipid microspheres, oxyCODONE-acetaminophen, heparin (porcine), albuterol sulfate HFA, cyclobenzaprine, ipratropium-albuterol, sodium chloride flush, sodium chloride, promethazine, acetaminophen **OR** acetaminophen, glucose, dextrose bolus **OR** dextrose bolus, glucagon (rDNA), dextrose      Intake/Output Summary (Last 24 hours) at 9/29/2022 0859  Last data filed at 9/28/2022 2148  Gross per 24 hour   Intake 420 ml   Output 3885 ml   Net -3465 ml         Physical Exam Performed:    /64   Pulse 59   Temp 97.6 °F (36.4 °C) (Oral)   Resp 16   Ht 5' 9\" (1.753 m)   Wt 238 lb 1.6 oz (108 kg)   SpO2 94%   BMI 35.16 kg/m²     General appearance: No apparent distress, appears stated age and cooperative. HEENT: Pupils equal, round, and reactive to light. Conjunctivae/corneas clear. Neck: Supple, with full range of motion. No jugular venous distention.  Trachea midline. Respiratory:  Normal respiratory effort. Clear to auscultation, bilaterally without Rales/Wheezes/Rhonchi. Cardiovascular: Regular rate and rhythm with normal S1/S2 without murmurs, rubs or gallops. Abdomen: Soft, non-tender, non-distended with normal bowel sounds. Musculoskeletal: No clubbing, cyanosis or edema bilaterally. Full range of motion without deformity. Skin: Skin color, texture, turgor normal.  No rashes or lesions. Neurologic:  Neurovascularly intact without any focal sensory/motor deficits. Cranial nerves: II-XII intact, grossly non-focal.  Psychiatric: Alert and oriented, thought content appropriate, normal insight  Capillary Refill: Brisk,< 3 seconds   Peripheral Pulses: +2 palpable, equal bilaterally       Labs:   Recent Labs     09/27/22  0509 09/28/22  0506 09/29/22  0754   WBC 5.8 6.1 9.0   HGB 10.7* 10.6* 11.3*   HCT 32.7* 33.2* 35.9*    251 300       Recent Labs     09/27/22  0509 09/28/22  0506   * 128*   K 4.7 5.2*  5.2*   CL 92* 92*   CO2 20* 16*   BUN 47* 68*   CREATININE 6.5* 7.7*   CALCIUM 8.2* 8.4   PHOS  --  8.5*       No results for input(s): AST, ALT, BILIDIR, BILITOT, ALKPHOS in the last 72 hours. No results for input(s): INR in the last 72 hours. No results for input(s): Juanis Fuse in the last 72 hours.       Urinalysis:      Lab Results   Component Value Date/Time    NITRU Negative 05/29/2022 12:51 PM    WBCUA 10-20 05/29/2022 12:51 PM    BACTERIA 3+ 05/29/2022 12:51 PM    RBCUA 5-10 05/29/2022 12:51 PM    BLOODU TRACE-INTACT 05/29/2022 12:51 PM    SPECGRAV 1.015 05/29/2022 12:51 PM    GLUCOSEU 100 05/29/2022 12:51 PM       Consults:    IP CONSULT TO HOSPITALIST  IP CONSULT TO HEART FAILURE NURSE/COORDINATOR  IP CONSULT TO DIETITIAN  IP CONSULT TO NEPHROLOGY  IP CONSULT TO CARDIOLOGY  IP CONSULT TO PALLIATIVE CARE  IP CONSULT TO PULMONOLOGY      Assessment/Plan:    Active Hospital Problems    Diagnosis     CAD (coronary artery disease) [I25.10]      Priority: High    CHF (congestive heart failure) ([facility]) [I50.9]      Priority: High    Pericardial effusion [I31.3]      Priority: Medium    SOB (shortness of breath) [R06.02]      Priority: Medium    Cardiomyopathy ([facility] [I42.9]      Priority: Medium    History of transcatheter aortic valve replacement (TAVR) [Z95.2]      Priority: Medium    Uncontrolled hypertension [I10]      Priority: Medium    Asthma-COPD overlap syndrome ([facility] [J44.9]      Priority: Medium    PAF (paroxysmal atrial fibrillation) ([facility]) [I48.0]     ESRD (end stage renal disease) on dialysis ([facility] [N18.6, Z99.2]     Pleural effusion on left [J90]     ZAINAB on CPAP [G47.33, Z99.89]     Obesity (BMI 30-39. 9) [E66.9]          CHF - acute on chronic combined sytolic/diastolic failure w/ reduced EF 20-25% by Echo June 2022. Likely due to hypertensive and ischemic heart disease. Continue current medical management and follow I/O as well as clinical response. W/ known CardioRenal syndrome. Cardiology consulted and appreciated. Nephrology consulted and appreciated. Acute Respiratory Failure - w/ hypoxia, POArrival, likely 2nd to above. Presence of clinical respiratory distress w/ tachypnea/dyspnea/SOB and wheezing w/ use of accessory muscles to breath. Supplemental O2 and wean as tolerated. Possible Thoracentesis off anticoagulation     ESRD - on HD M/W/F. Nephrology consulted and appreciated. HTN/CAD - w/ known CAD but no evidence of active signs/sxs of ischemia/failure. Currently controlled on home meds w/ vitals reviewed and documented as above. Afib - chronic paroxysmal of unspecified and clinically unable to determine etiology. Normally rate controlled on BBlocker - continued. Anticoagulated at baseline on home Eliquis due to secondary hypercoaguable state due to Afib - currently held but anticipate resuming at discharge. Monitored on tele.      Left Pleural effusion - w/ plan for possible Thoracentesis off anticoagulation     Diabetes Mellitus, Type 2: Controlled. Continue SSI. Monitor blood sugar and adjust regimen as needed. Anxiety/Depression - controlled on home medications - continued. Obesity -  With Body mass index is 35.16 kg/m². Complicating assessment and treatment. Placing patient at risk for multiple co-morbidities as well as early death and contributing to the patient's presentation. Counseled on weight loss. ZAINAB - likely 2nd to obesity. Controlled on home CPAP/BiPap - continued, w/ outpatient f/u as previously arranged. DVT Prophylaxis: Eliquis - currently held     Recent Labs     09/27/22  0509 09/28/22  0506 09/29/22  0754    251 300       Diet: ADULT DIET; Regular  Code Status: Full Code      PT/OT Eval Status: seen and continuing to assess. Dispo - Patient is likely to remain in-house at least until Sat/Sunday 1/2 October Sept pending clinical course, subspecialty recs and placement decision.        Maikel Ashraf MD

## 2022-09-29 NOTE — PROGRESS NOTES
[facility] Progress Note           Subjective/   45y.o. year old male who we are seeing in consultation for ESKD on HD. HPI:  Last HD on 9/28 with 3.6 liters removed, post-weight of 103.3 kg. States shortness of breath persists, does not improve with HD, remains on 3 L NC.    ROS:  No chest pain, intake adequate.     Objective/   GEN:  Chronically ill, /61   Pulse 56   Temp 97.8 °F (36.6 °C) (Oral)   Resp 16   Ht 5' 9\" (1.753 m)   Wt 238 lb 1.6 oz (108 kg)   SpO2 99%   BMI 35.16 kg/m²   HEENT: non-icteric, no JVD  CV: S1, S2 without m/r/g; no LE edema  RESP: CTA B w/o w/r/r, decreased on left; breathing wnl  ABD: +bs, soft, nt, no hsm  SKIN: warm, no rashes  ACCESS: Left IJ [facility]    Data/  Recent Labs     09/27/22  0509 09/28/22  0506 09/29/22  0754   WBC 5.8 6.1 9.0   HGB 10.7* 10.6* 11.3*   HCT 32.7* 33.2* 35.9*   MCV 89.4 88.3 89.0    251 300       Recent Labs     09/27/22  0509 09/28/22  0506 09/29/22  0754   * 128* 129*   K 4.7 5.2*  5.2* 5.4*  5.4*   CL 92* 92* 91*   CO2 20* 16* 26   GLUCOSE 130* 134* 175*   PHOS  --  8.5* 6.1*   MG  --   --  2.00   BUN 47* 68* 51*   CREATININE 6.5* 7.7* 5.7*   LABGLOM 9* 7* 10*   GFRAA 11* 9* 13*         Assessment/     - End stage kidney disease - on HD Mon-Wed-Fri    - NSTEMI    - Hypertension    - s/p TAVR    - Left pleural effusion - increased in size from CT chest on 9/28    - COPD/ZAINAB - requiring bi-level    - Anemia of chronic disease - DAVON with HD    - Hyperkalemia      Plan/     - HD per [facility] schedule - 3-4 L UF as tolerated with crit line monitoring to target pleural effusion, may need thoracentesis, defer to Pulmonary, outpatient .5 kg  - DAVON with HD, held on 9/28   - Decrease entresto dose in 1/2 and prn dose of lokelma to help with high K's today  - Trend labs, bp's    Case d/w Cardiology    ____________________________________  Melissa Dorsey MD  [facility]

## 2022-09-30 LAB
ALBUMIN SERPL-MCNC: 3.7 G/DL (ref 3.4–5)
ANION GAP SERPL CALCULATED.3IONS-SCNC: 15 MMOL/L (ref 3–16)
BUN BLDV-MCNC: 69 MG/DL (ref 7–20)
CALCIUM SERPL-MCNC: 8.7 MG/DL (ref 8.3–10.6)
CHLORIDE BLD-SCNC: 90 MMOL/L (ref 99–110)
CO2: 25 MMOL/L (ref 21–32)
CREAT SERPL-MCNC: 7.4 MG/DL (ref 0.9–1.3)
GFR AFRICAN AMERICAN: 9
GFR NON-AFRICAN AMERICAN: 8
GLUCOSE BLD-MCNC: 126 MG/DL (ref 70–99)
GLUCOSE BLD-MCNC: 148 MG/DL (ref 70–99)
GLUCOSE BLD-MCNC: 201 MG/DL (ref 70–99)
GLUCOSE BLD-MCNC: 227 MG/DL (ref 70–99)
HCT VFR BLD CALC: 32.8 % (ref 40.5–52.5)
HEMOGLOBIN: 10.5 G/DL (ref 13.5–17.5)
MCH RBC QN AUTO: 28.7 PG (ref 26–34)
MCHC RBC AUTO-ENTMCNC: 32.1 G/DL (ref 31–36)
MCV RBC AUTO: 89.3 FL (ref 80–100)
PDW BLD-RTO: 16.1 % (ref 12.4–15.4)
PERFORMED ON: ABNORMAL
PHOSPHORUS: 6.6 MG/DL (ref 2.5–4.9)
PLATELET # BLD: 298 K/UL (ref 135–450)
PMV BLD AUTO: 7.8 FL (ref 5–10.5)
POTASSIUM REFLEX MAGNESIUM: 4.6 MMOL/L (ref 3.5–5.1)
POTASSIUM SERPL-SCNC: 4.6 MMOL/L (ref 3.5–5.1)
RBC # BLD: 3.67 M/UL (ref 4.2–5.9)
SODIUM BLD-SCNC: 130 MMOL/L (ref 136–145)
WBC # BLD: 7.8 K/UL (ref 4–11)

## 2022-09-30 PROCEDURE — 2060000000 HC ICU INTERMEDIATE R&B

## 2022-09-30 PROCEDURE — 80069 RENAL FUNCTION PANEL: CPT

## 2022-09-30 PROCEDURE — 6360000002 HC RX W HCPCS

## 2022-09-30 PROCEDURE — 6370000000 HC RX 637 (ALT 250 FOR IP): Performed by: NURSE PRACTITIONER

## 2022-09-30 PROCEDURE — 85027 COMPLETE CBC AUTOMATED: CPT

## 2022-09-30 PROCEDURE — 6370000000 HC RX 637 (ALT 250 FOR IP): Performed by: INTERNAL MEDICINE

## 2022-09-30 PROCEDURE — 99232 SBSQ HOSP IP/OBS MODERATE 35: CPT | Performed by: INTERNAL MEDICINE

## 2022-09-30 PROCEDURE — 2700000000 HC OXYGEN THERAPY PER DAY

## 2022-09-30 PROCEDURE — 36415 COLL VENOUS BLD VENIPUNCTURE: CPT

## 2022-09-30 PROCEDURE — 94761 N-INVAS EAR/PLS OXIMETRY MLT: CPT

## 2022-09-30 PROCEDURE — 94640 AIRWAY INHALATION TREATMENT: CPT

## 2022-09-30 PROCEDURE — 90935 HEMODIALYSIS ONE EVALUATION: CPT

## 2022-09-30 PROCEDURE — 94660 CPAP INITIATION&MGMT: CPT

## 2022-09-30 PROCEDURE — 99232 SBSQ HOSP IP/OBS MODERATE 35: CPT | Performed by: NURSE PRACTITIONER

## 2022-09-30 RX ADMIN — METOPROLOL SUCCINATE 100 MG: 50 TABLET, EXTENDED RELEASE ORAL at 19:55

## 2022-09-30 RX ADMIN — NEPHROCAP 1 MG: 1 CAP ORAL at 08:09

## 2022-09-30 RX ADMIN — ISOSORBIDE DINITRATE 20 MG: 20 TABLET ORAL at 15:05

## 2022-09-30 RX ADMIN — DULOXETINE HYDROCHLORIDE 30 MG: 30 CAPSULE, DELAYED RELEASE ORAL at 08:10

## 2022-09-30 RX ADMIN — SACUBITRIL AND VALSARTAN 0.5 TABLET: 24; 26 TABLET, FILM COATED ORAL at 08:09

## 2022-09-30 RX ADMIN — PANTOPRAZOLE SODIUM 40 MG: 40 TABLET, DELAYED RELEASE ORAL at 06:19

## 2022-09-30 RX ADMIN — OXYCODONE AND ACETAMINOPHEN 1 TABLET: 7.5; 325 TABLET ORAL at 08:13

## 2022-09-30 RX ADMIN — OXYCODONE AND ACETAMINOPHEN 1 TABLET: 7.5; 325 TABLET ORAL at 19:55

## 2022-09-30 RX ADMIN — EPOETIN ALFA-EPBX: 10000 INJECTION, SOLUTION INTRAVENOUS; SUBCUTANEOUS at 13:21

## 2022-09-30 RX ADMIN — METOPROLOL SUCCINATE 100 MG: 50 TABLET, EXTENDED RELEASE ORAL at 08:10

## 2022-09-30 RX ADMIN — ISOSORBIDE DINITRATE 20 MG: 20 TABLET ORAL at 19:55

## 2022-09-30 RX ADMIN — SACUBITRIL AND VALSARTAN 0.5 TABLET: 24; 26 TABLET, FILM COATED ORAL at 19:55

## 2022-09-30 RX ADMIN — QUETIAPINE FUMARATE 50 MG: 25 TABLET ORAL at 19:55

## 2022-09-30 RX ADMIN — TIOTROPIUM BROMIDE AND OLODATEROL 2 PUFF: 3.124; 2.736 SPRAY, METERED RESPIRATORY (INHALATION) at 07:44

## 2022-09-30 RX ADMIN — ISOSORBIDE DINITRATE 20 MG: 20 TABLET ORAL at 08:09

## 2022-09-30 RX ADMIN — PRAVASTATIN SODIUM 40 MG: 40 TABLET ORAL at 08:09

## 2022-09-30 RX ADMIN — ASPIRIN 81 MG 81 MG: 81 TABLET ORAL at 08:09

## 2022-09-30 ASSESSMENT — PAIN DESCRIPTION - LOCATION: LOCATION: BACK

## 2022-09-30 ASSESSMENT — PAIN SCALES - GENERAL
PAINLEVEL_OUTOF10: 6
PAINLEVEL_OUTOF10: 0

## 2022-09-30 ASSESSMENT — PAIN DESCRIPTION - ORIENTATION: ORIENTATION: LOWER

## 2022-09-30 ASSESSMENT — PAIN DESCRIPTION - DESCRIPTORS: DESCRIPTORS: ACHING

## 2022-09-30 NOTE — PROGRESS NOTES
Comprehensive Nutrition Assessment    Type and Reason for Visit:  RD Nutrition Re-Screen/LOS    Nutrition Recommendations/Plan:   Modify diet to heart healthy, low phosphorous   Encourage PO intakes  Monitor nutrition adequacy, pertinent labs, bowel habits, wt changes, and clinical progress     Malnutrition Assessment:  Malnutrition Status: At risk for malnutrition (Comment) (09/30/22 1349)    Context:  Acute Illness       Nutrition Assessment:    LOS assessment. Pt admitted with SOB 2/2 fluid overload. PMH CHF, COPD, T2DM, ESRD on HD. Currently on a regular diet with PO intakes % at meals. Pt unavailable during attempts to visit today. Will modify to heart healthy, low phosphorous diet. Continue to monitor. Nutrition Related Findings:    Na 130. BUN 69. Cr 74. Phos 6.6. -196 mg/dl x24hrs Wound Type: None       Current Nutrition Intake & Therapies:    Average Meal Intake: %  Average Supplements Intake: None Ordered  ADULT DIET; Regular    Anthropometric Measures:  Height: 5' 9\" (175.3 cm)  Ideal Body Weight (IBW): 160 lbs (73 kg)       Current Body Weight: 229 lb (103.9 kg), 143.1 % IBW. Weight Source: Standing Scale  Current BMI (kg/m2): 33.8                          BMI Categories: Obese Class 1 (BMI 30.0-34. 9)    Nutrition Diagnosis:   No nutrition diagnosis at this time     Nutrition Interventions:   Food and/or Nutrient Delivery: Continue Current Diet  Nutrition Education/Counseling: Education needed  Coordination of Nutrition Care: Continue to monitor while inpatient       Goals:     Goals: PO intake 50% or greater, prior to discharge       Nutrition Monitoring and Evaluation:      Food/Nutrient Intake Outcomes: Food and Nutrient Intake  Physical Signs/Symptoms Outcomes: Biochemical Data, Weight, Fluid Status or Edema    Discharge Planning:    Continue current diet     [address] [facility], [address]: [id number]

## 2022-09-30 NOTE — PROGRESS NOTES
09/30/22 0024   NIV Type   Skin Assessment Clean, dry, & intact   Skin Protection for O2 Device Yes   Location Nose   Equipment Type v60   Mode Bilevel   Mask Type Full face mask   Mask Size Large   Settings/Measurements   IPAP 18 cmH20   CPAP/EPAP 6 cmH2O   Vt (Measured) 532 mL   Resp 18   FiO2  40 %   I Time/ I Time % 1 s   Minute Volume (L/min) 7.5 Liters   Mask Leak (lpm) 39 lpm   Comfort Level Good   Using Accessory Muscles No   SpO2 96   Patient's Home Machine No

## 2022-09-30 NOTE — PROGRESS NOTES
INPATIENT PULMONARY CRITICAL CARE PROGRESS NOTE      Reason for visit    Left pleural effusion     SUBJECTIVE: Patient when seen this morning was getting in the cycle of dialysis, patient continues to have some intermittent shortness of breath, patient was not have any chest pain, patient was afebrile and hemodynamically maintained, patient was on 3 L of nasal cannula oxygen saturating 96%, patient has sinus rhythm on the monitor, patient's was having acceptable blood sugars, patient was alert and oriented, no other pertinent review of system of concern         Physical Exam:  Blood pressure 132/75, pulse 55, temperature 98.6 °F (37 °C), resp. rate 16, height 5' 9\" (1.753 m), weight 236 lb 15.9 oz (107.5 kg), SpO2 96 %.'     Constitutional: No respiratory distress   HENT: No facial asymmetry no thyromegaly. Eyes:  Conjunctivae are normal. Pupils equal, round, and reactive to light. No scleral icterus. Neck: . No tracheal deviation present. No obvious thyroid mass. Short large neck  Cardiovascular: Normal rate, regular rhythm, normal heart sounds. No right ventricular heave. some lower extremity edema. Pulmonary/Chest: No wheezes. Decreased bilateral rales. Chest wall is not dull to percussion. Some accessory muscle usage or stridor. Decreased breath sound intensity(L>R)  Abdominal: Soft. Bowel sounds present. No distension or hernia. No tenderness. Obese  Musculoskeletal: No cyanosis. No clubbing. No obvious joint deformity. Lymphadenopathy: No cervical or supraclavicular adenopathy. Skin: Skin is warm and dry. No rash or nodules on the exposed extremities.   Psychiatric: Normal reaction ;no anxiety   Neurologic Alert and communicative with no focal cranial deficits    Results:  CBC:   Recent Labs     09/28/22  0506 09/29/22  0754 09/30/22  0502   WBC 6.1 9.0 7.8   HGB 10.6* 11.3* 10.5*   HCT 33.2* 35.9* 32.8*   MCV 88.3 89.0 89.3    300 298       BMP:   Recent Labs     09/28/22  0506 09/29/22  0754 09/30/22  0502   * 129* 130*   K 5.2*  5.2* 5.4*  5.4* 4.6  4.6   CL 92* 91* 90*   CO2 16* 26 25   PHOS 8.5* 6.1* 6.6*   BUN 68* 51* 69*   CREATININE 7.7* 5.7* 7.4*           Imaging:  I have reviewed radiology images personally. CT CHEST WO CONTRAST   Final Result   1. Increased size of a large left pleural effusion with adjacent   consolidation in the left upper and lower lobes, particularly the lower lobe. Airspace consolidation could represent atelectasis related to the effusion or   pneumonia. 2. Stable 3 mm left upper lobe lung nodule. If patient is considered high   risk, a 1 year follow-up chest CT is suggested. RECOMMENDATIONS:   Lung nodule Fleischner Society guidelines for follow-up and management of   incidentally detected pulmonary nodules:      Single Solid Nodule:      ~Nodule size less than 6 mm. In a low-risk patient, no routine follow-up. In a high-risk patient, optional CT at 12 months. Radiology 2017 http://pubs. rsna.org/doi/full/10.1148/radiol. 6580877758         XR CHEST (2 VW)   Final Result   1. Diffuse opacity lower 2/3 left hemithorax obscuring the left hilum, left   heart margin and left hemidiaphragm slightly increased since prior study in   keeping with consolidation and pleural effusion. 2. Right lung appears clear. 3. Stable left IJ hemodialysis catheter, tip at the superior cavoatrial   junction level. XR CHEST PORTABLE   Final Result   Moderate left pleural effusion with associated left lower lobe airspace   opacities.            Echo Limited    Result Date: 9/28/2022  Transthoracic Echocardiography Report (TTE)  Demographics   Patient Name       Maria Ines Deer   Date of Study      09/28/2022         Gender               Male   Patient Number     [id number]         Date of Birth        1970   Visit Number       [id number]          Age                  45 year(s)   Accession Number   [id number]         Room Number 101 [facility]       [id number]            Sonographer          Tierra Banegas,    Ordering Physician Migdalia Gaona   Interpreting         [address] [facility]             Physician            Vivienne Carlisle MD  Procedure Type of Study   TTE procedure:ECHOCARDIOGRAM LIMITED. Procedure Date Date: 09/28/2022 Start: 07:01 AM Study Location: [address] [place] [address] - Echo Lab Technical Quality: Adequate visualization Indications:Dyspnea/SOB. Patient Status: Routine Height: 69 inches Weight: 235.01 pounds BSA: 2.21 m2 BMI: 34.7 kg/m2 BP: 101/60 mmHg  Conclusions   Summary  Patient refused use of Definity contrast.  Limited only f/u for LVEF and s/p TAVR. The left ventricular systolic function appears severely reduced with  visually estimated ejection fraction of 20-25%. Poor endocardial visualization. By history, there is a #29 mm Langford Leyda S3 bioprosthetic valve in the  aortic position. The valve appears well seated with no rocking motion. Valve  leaflets are not well-evaluated. Normal Doppler values. No evidence of aortic valve regurgitation. Pericardial effusion, noted darrick-apically where measures up to 1.5 cm,  anteriorly along right ventricle, with exudate. Appears likely trace along  lateral wall. Difficult to discern apically/laterally however, whether  pericardial or tracking pleural effusion. No tamponade physiology. There is a large left pleural effusion. Non-contrasted CT is suggested for further characterization of pericardial  versus pleural effusion. Signature   ------------------------------------------------------------------  Electronically signed by Vivienne Carlisle MD (Interpreting  physician) on 09/28/2022 at 11:29 AM  ------------------------------------------------------------------   Findings   Left Ventricle  The left ventricular systolic function appears severely reduced with  visually estimated ejection fraction of 20-25%. Poor endocardial visualization. Aortic Valve  By history, there is a #29 mm Langford Leyda S3 bioprosthetic valve in the  aortic position. The valve appears well seated with no rocking motion. Valve  leaflets are not well-evaluated. Normal Doppler values. There is a maximum gradient of 18 mmHg, a mean gradient of 8 mmHg, and the  valve area is calculated at 2.13 cm2 by the continuity equation. No evidence of aortic valve regurgitation. Pericardial Effusion  Pericardial effusion, noted darrick-apically where measures up to 1.5 cm,  anteriorly along right ventricle, with exudate. Appears likely trace along  lateral wall. Difficult to discern apically/laterally however, whether  pericardial or tracking pleural effusion. No IVC plethora, chamber collapse,  or respiration flow variation. Pleural Effusion  There is a large left pleural effusion. Miscellaneous  IVC size is normal (<2.1cm) and collapses > 50% with respiration consistent  with normal RA pressure (3mmHg). M-Mode/2D Measurements (cm)                AO Root Dimension: 2.2 cm               AV Cusp Separation: 1.5 cm  LVOT: 2.6 cm  Doppler Measurements   AV Peak Velocity: 211 cm/s  AV Peak Gradient: 17.81 mmHg  AV Mean Gradient: 8 mmHg  LVOT Peak Velocity: 83.6 cm/s  AV Area (Continuity):2.26 cm2   Aortic Valve   Peak Velocity: 211 cm/s     Mean Velocity: 129 cm/s  Peak Gradient: 17.81 mmHg   Mean Gradient: 8 mmHg  Area (continuity): 2.26 cm2  AV VTI: 42.3 cm   Cusp Separation: 1.5 cm  Aorta   Aortic Root: 2.2 cm  LVOT Diameter: 2.6 cm      XR CHEST (2 VW)    Result Date: 9/27/2022  EXAMINATION: TWO XRAY VIEWS OF THE CHEST 9/27/2022 3:17 pm COMPARISON: Chest 09/23/2022 HISTORY: ORDERING SYSTEM PROVIDED HISTORY: evaluate left pleural effusion Reason for Exam: f/u FINDINGS: *4 images are presented.  *Diffuse opacity lower 2/3 left hemithorax obscuring the left hilum, left heart margin and left hemidiaphragm slightly increased since prior study in keeping with consolidation and pleural effusion. *Right lung appears clear. *Stable left IJ hemodialysis catheter, tip at the superior cavoatrial junction level. *No acute osseous abnormality. *TAVR. 1. Diffuse opacity lower 2/3 left hemithorax obscuring the left hilum, left heart margin and left hemidiaphragm slightly increased since prior study in keeping with consolidation and pleural effusion. 2. Right lung appears clear. 3. Stable left IJ hemodialysis catheter, tip at the superior cavoatrial junction level. CT CHEST WO CONTRAST    Result Date: 9/28/2022  EXAMINATION: CT OF THE CHEST WITHOUT CONTRAST 9/28/2022 2:16 pm TECHNIQUE: CT of the chest was performed without the administration of intravenous contrast. Multiplanar reformatted images are provided for review. Automated exposure control, iterative reconstruction, and/or weight based adjustment of the mA/kV was utilized to reduce the radiation dose to as low as reasonably achievable. COMPARISON: Radiograph 09/27/2022 and CT 05/29/2022 HISTORY: ORDERING SYSTEM PROVIDED HISTORY: evaluate pericardial effusion and left pleural effusion TECHNOLOGIST PROVIDED HISTORY: Reason for exam:->evaluate pericardial effusion and left pleural effusion Reason for Exam: SOB, MID CHEST PRESSURE Relevant Medical/Surgical History: COPD, DIABETES FINDINGS: Mediastinum: Of precarinal lymph node measures 1.1 cm versus 2 cm previously. No enlarged lymph nodes are noted elsewhere. The heart is not enlarged. There is no pericardial effusion. There has been an aortic valve replacement. There are advanced coronary arterial calcifications. Lungs/pleura: There is a large left pleural effusion which is larger than before with adjacent consolidation in the left upper and lower lobes. This involves the lower lobes the greatest degree. A linear opacity in the right lower lobe likely represents atelectasis or scarring. The central airways are normally patent.   Left internal jugular venous catheter extends to the cavoatrial junction. A 3 mm left upper lobe nodule is stable. Upper Abdomen: There has been a cholecystectomy. The upper abdomen is otherwise unremarkable. Soft Tissues/Bones: No destructive bone lesion is identified. 1. Increased size of a large left pleural effusion with adjacent consolidation in the left upper and lower lobes, particularly the lower lobe. Airspace consolidation could represent atelectasis related to the effusion or pneumonia. 2. Stable 3 mm left upper lobe lung nodule. If patient is considered high risk, a 1 year follow-up chest CT is suggested. RECOMMENDATIONS: Lung nodule Fleischner Society guidelines for follow-up and management of incidentally detected pulmonary nodules: Single Solid Nodule: ~Nodule size less than 6 mm. In a low-risk patient, no routine follow-up. In a high-risk patient, optional CT at 12 months. Radiology 2017 http://pubs. rsna.org/doi/full/10.1148/radiol. 3389731659     XR CHEST PORTABLE    Result Date: 9/23/2022  EXAMINATION: ONE XRAY VIEW OF THE CHEST 9/23/2022 4:44 pm COMPARISON: Chest x-ray dated 2 August 2022 HISTORY: ORDERING SYSTEM PROVIDED HISTORY: SOB, decreased sounds on Left TECHNOLOGIST PROVIDED HISTORY: Reason for exam:->SOB, decreased sounds on Left Reason for Exam: sob FINDINGS: Mild cardiomegaly with left-sided central venous catheter with the tip in the superior vena cava. The right lung is clear. Stable appearance of the left lung with moderate left pleural effusion and left lower lobe airspace opacities. Moderate left pleural effusion with associated left lower lobe airspace opacities.              Assessment:  Principal Problem:    CHF (congestive heart failure) (HCC)  Active Problems:    CAD (coronary artery disease)    Asthma-COPD overlap syndrome (HCC)    Uncontrolled hypertension    History of transcatheter aortic valve replacement (TAVR)    Cardiomyopathy (HCC)    SOB (shortness of breath)    Pericardial effusion    Obesity (BMI 30-39.9)    ZAINAB on CPAP    Pleural effusion on left    ESRD (end stage renal disease) on dialysis (HCC)    PAF (paroxysmal atrial fibrillation) ([facility] [address]  Resolved Problems:    * No resolved hospital problems.  *          Plan:   Oxygen supplementation  to keep saturation being 90-94% only  Please titrate down the oxygen as per the above parameters  Home CPAP on intermittent basis  Patient's acid-base balance is maintained and does not need any change or escalation   Pulmonary toilet  Patient does not need any antibiotics from pulmonary standpoint of view  HD as per nephrology-getting 1 cycle today and will be getting another round of dialysis tomorrow as per nephrology  Patient has large pleural effusion which may be contributing to patient's symptoms  Patient's Eliquis is on hold   Was informed with the nursing that patient's Plavix was not discontinued on 24th of this month but instead it was stopped only on 27th of this month and for that reason further changes may have to be postponed with Plavix being stopped for 5 days-discussed with patient also who understands  Antihypertensives as per IM/nephrology   Patient may need dry weight to be modified-nephrology to decide upon that   Optimization of cardiac medications as per cardiology  Bronchodilators  No need for  any systemic steroids at this time  Blood glucose monitoring with sliding scale insulin  May need long acting insulin -as per IM   Trend hemodynamics and cardiac rhythm closely  Diet and life style modifications  PUD prophylaxis      Case d/w patient and nephrology            Electronically signed by:  Shiraz Ray MD    9/30/2022    10:02 AM.

## 2022-09-30 NOTE — PROGRESS NOTES
[facility] 79   Daily Progress Note    Admit Date:  9/23/2022  HPI:    Chief Complaint   Patient presents with    Respiratory Distress     Squad reporting pt called c/o SOB x 3 days. Only received 45min of dialysis today due to feeling SOB. Squad reports pt was tripoding upon their arrival with a RA sat of 96%. They report patient was not moving any air. 1 duoneb and 1 albuterol given in route to the ED along with BIPAP. Interval history: Philip Ferrer is being followed for CHF, shortness of breath, elevated troponin. Subjective:  Mr. Renny Aguirre still with some shortness of breath but improved.   Also continues to have chest pain/pressure which is worse in AM and late evening, exertional.    Objective:   /70   Pulse 53   Temp 98.6 °F (37 °C)   Resp 17   Ht 5' 9\" (1.753 m)   Wt 229 lb 8 oz (104.1 kg)   SpO2 96%   BMI 33.89 kg/m²     Intake/Output Summary (Last 24 hours) at 9/30/2022 1303  Last data filed at 9/30/2022 1236  Gross per 24 hour   Intake 400 ml   Output 3500 ml   Net -3100 ml         NYHA: IV    Physical Exam:  General:  Awake, alert, NAD, appears older than stated age,   Skin:  Warm and dry  Neck:  JVD difficult to assess   Chest: Clear to auscultation though diminished on the left  Telemetry: N/A  Cardiovascular:  RRR S1S2, no m/r/g   Abdomen:  Soft, nontender, +bowel sounds  Extremities:  1+  bilateral lower extremity edema    Medications:    epoetin siddharth-epbx        sacubitril-valsartan  0.5 tablet Oral BID    aspirin  81 mg Oral Daily    epoetin siddharth-epbx  5,400 Units IntraVENous Q MWF    insulin lispro  0-4 Units SubCUTAneous TID WC    insulin lispro  0-4 Units SubCUTAneous Nightly    [Held by provider] apixaban  5 mg Oral BID    [Held by provider] clopidogrel  75 mg Oral Daily    DULoxetine  30 mg Oral Daily    linaclotide  145 mcg Oral QAM AC    isosorbide dinitrate  20 mg Oral TID    pantoprazole  40 mg Oral QAM AC    pravastatin  40 mg Oral Daily    tiotropium-olodaterol  2 puff Inhalation Daily    QUEtiapine  50 mg Oral Nightly    metoprolol succinate  100 mg Oral BID    docusate sodium  100 mg Oral Daily    b complex-C-folic acid  1 capsule Oral Daily    sodium chloride flush  10 mL IntraVENous 2 times per day      sodium chloride      dextrose         Lab Data:  CBC:   Recent Labs     22  0506 22  0754 22  0502   WBC 6.1 9.0 7.8   HGB 10.6* 11.3* 10.5*    300 298       BMP:    Recent Labs     22  0506 22  0754 22  0502   * 129* 130*   K 5.2*  5.2* 5.4*  5.4* 4.6  4.6   CO2 16* 26 25   BUN 68* 51* 69*   CREATININE 7.7* 5.7* 7.4*       INR:  No results for input(s): INR in the last 72 hours. BNP:    Recent Labs     22   PROBNP 54,077*       Lab Results   Component Value Date    LVEF 20 2022    LVEFMODE Echo 10/16/2019       Testing:    Procedure Date:  2022  Patient Name: Fadi Meza  MRN: [id number]      : 1970        INDICATION      High risk abnormal stress test  Ischemic or mildly  Non-STEMI     PROCEDURES PERFORMED      Right heart catheterization  Left heart catheterization  LVgram  Coronary angiogam  Coronary cath  Monitoring of moderate conscious sedation              PROCEDURE DESCRIPTION   Risks/benefits/alternatives/outcomes were discussed with patient and/or family and informed consent was obtained. Using the [facility] scale, the patient's right radial artery was found to be a level B. Patient was prepped draped in the usual sterile fashion. Local anaesthetic was applied over puncture site. Using ultrasound guidance and a front wall technique, a 4/5 [place] Terumo sheath was inserted into right radial artery. Verapamil, nitroglycerin, nicardipine were administered through the sheath. Heparin was administered. Diagnostic 5 [place] pigtail, TIG, Binu catheters were used for diagnostic angiograms.   At the conclusion of the procedure, a TR band was placed over the puncture site and hemostasis was obtained. There were no immediate complications. I supervised sedation from 9:53 AM to 11:08 AM with versed 5 mg/fentanyl 125 mcg during the procedure. An independent trained observer pushed meds at my direction. We monitored the patient's level of consciousness and vital signs/physiologic status throughout the procedure duration (see times listed previously). 205 cc contrast was utilized. <20cc EBL. Case/findings/plans discussed with patient's wife, she understood/agreed. FINDINGS      HEMODYNAMICS     CHAMBER PRESSURE SATURATION   RA  10 60%   RV  47/9     PA  50/20  67%   PW  19     AORTA  101/50  95%      NANCY CARDIAC OUTPUT  5.8 L/min   SVR  756    PVR  234             LVGRAM     LVEDP  21   GRADIENT ACROSS AORTIC VALVE  less than 5 mmHg   LV FUNCTION EF 20%   WALL MOTION  severe global hypokinesis with regional variation   MITRAL REGURGITATION  mild         CORONARY ARTERIES     LM Less than 10% htfpfckn-jlk-omcrat stenosis            LAD Moderately calcified, 20 to 30% proximal-mid stenosis, distal vessel tapers at the apex with 60% diffuse disease. D1 has an ostial/proximal 75 to 80% stenosis. LCX  Calcified, proximal-mid 75 to 80% stenosis. Distal vessel is tortuous with 70 to 85% diffuse stenosis with more discrete stenosis noted distally. OM 1 is a very small vessel with ostial/proximal 80% stenosis and 60 to 70% diffuse disease in the tzmfzauy-mmg-jmzqio vessel. Pink Ashwin RCA Dominant, calcified, tortuous, there is a proximal-mid stent with 60 to 70% in-stent restenosis. Distal vessel 20 to 30% stenosis         CONCLUSIONS:      Mild to moderately increased filling pressures  Patent RCA stent with moderate to borderline severe in-stent restenosis  Severe circumflex and D1 stenosis  Continue medical management, consider outpatient high risk PCI of above territories pending outpatient clinical follow-up.   Currently medical management seems best given comorbidities and need for additional fluid removal.  If PCI is pursued, will likely need general anesthesia. Patient had difficulty lying still on Cath Lab table. Okay to resume Eliquis tomorrow, case/plan/findings discussed with patient and wife, they understand/agree, they stated it is incorrectly stated in chart the patient would refuse blood, he is okay to receive blood products if needed. Continue beta-blocker and Entresto    Echo 5/29/22   Summary   Definity® used for myocardial border enhancement. Left ventricular systolic function is severely reduced with a visually   estimated ejection fraction of 20-25 %. EF estimated by Jasmine's method at 24 %. The left ventricle is mildly dilated in size with normal wall thickness. Severe global hypokinesis with regional variation. Grade I diastolic dysfunction with normal LV pressure. There is no evidence of a left ventricular thrombus. Right ventricular systolic function is reduced. A 29 mm Langford Leyda S3 bioprosthetic aortic valve appears well seated   with normal Doppler values. Inadequate tricuspid valve regurgitation to estimate systolic pulmonary   artery pressure. There is a moderate left pleural effusion noted. Echo 9/28/22   Summary   Patient refused use of Definity contrast.   Limited only f/u for LVEF and s/p TAVR. The left ventricular systolic function appears severely reduced with   visually estimated ejection fraction of 20-25%. Poor endocardial visualization. By history, there is a #29 mm Langford Leyda S3 bioprosthetic valve in the   aortic position. The valve appears well seated with no rocking motion. Valve   leaflets are not well-evaluated. Normal Doppler values. No evidence of aortic valve regurgitation. Pericardial effusion, noted darrick-apically where measures up to 1.5 cm,   anteriorly along right ventricle, with exudate. Appears likely trace along   lateral wall.  Difficult to discern apically/laterally however, whether   pericardial or tracking pleural effusion. No tamponade physiology. There is a large left pleural effusion. Non-contrasted CT is suggested for further characterization of pericardial   versus pleural effusion. CHest CT 9/28/22   Impression:       1. Increased size of a large left pleural effusion with adjacent   consolidation in the left upper and lower lobes, particularly the lower lobe. Airspace consolidation could represent atelectasis related to the effusion or   pneumonia. 2. Stable 3 mm left upper lobe lung nodule. If patient is considered high   risk, a 1 year follow-up chest CT is suggested. Principal Problem:    CHF (congestive heart failure) ([facility])  Active Problems:    CAD (coronary artery disease)    Asthma-COPD overlap syndrome (HCC)    Uncontrolled hypertension    History of transcatheter aortic valve replacement (TAVR)    Cardiomyopathy (HCC)    SOB (shortness of breath)    Pericardial effusion    Obesity (BMI 30-39.9)    ZAINAB on CPAP    Pleural effusion on left    ESRD (end stage renal disease) on dialysis (HCC)    PAF (paroxysmal atrial fibrillation) ([facility] [address]  Resolved Problems:    * No resolved hospital problems.  *      Assessment:  Shortness of breath  ELevated troponin- chronically elevated    PAF  Multivessel CAD; CAD s/p PCI to LAD in 2015, RCA 1/2022   - severe LCx and D1 stenosis 6/2022 - med mgmt   AS S/P TAVR 2019  Ishcemic cardiomyopathy  HTN, DM, HLD  PAD s/p PTA right iliac artery  Left pleural effusion - large with consolidation   ESRD on HD  ZAINAB   Hx of CVA      Plan:  Continue to hold plavix for anticipation of a possible thoracentesis, on baby aspirin in the interim while holding plavix (held since 9/27/22) and eliquis (discussed with dr. Simeon Ann)   Volume control per HD   Toprol 100mg BID  Entresto decreased to half tablet of 24-26mg BID due to hyperkalemia  Continue Isordil 20mg TID   Eliquis on hold for possible procedures  Consider Ranexa or L-arginine for continued chest pressure/angina  Thoracentesis planned for Monday, 10/3/2022        JAY Valdez - ILAN,  9/30/2022, 1:03 PM

## 2022-09-30 NOTE — PROGRESS NOTES
09/30/22 0311   NIV Type   Skin Assessment Clean, dry, & intact   Skin Protection for O2 Device Yes   Location Nose   Equipment Type v60   Mode Bilevel   Mask Type Full face mask   Mask Size Large   Settings/Measurements   IPAP 18 cmH20   CPAP/EPAP 6 cmH2O   Vt (Measured) 665 mL   Resp 14   FiO2  40 %   I Time/ I Time % 1 s   Minute Volume (L/min) 9.2 Liters   Mask Leak (lpm) 32 lpm   Comfort Level Good   Using Accessory Muscles No   SpO2 96   Patient's Home Machine No

## 2022-09-30 NOTE — CARE COORDINATION
[facility] has approval under [facility] for patient to go to rehab but it expires today. Kandi at [facility] states they will need re-cert Monday. If for any reason [facility] denies it the second time he can still be admitted under his [facility]. Patient needs thoracentesis and this cannot be done until he is off plavix for 5 days. Held since 9/27. Plan for EGS Monday after procedure if able or Tuesday. PT/OT will need to work with patient over the weekend for re-cert.

## 2022-09-30 NOTE — PROGRESS NOTES
Hospitalist Progress Note      PCP: Santiago Clark MD    Date of Admission: 9/23/2022    Chief Complaint: SOB    Subjective: no new c/o. Medications:  Reviewed    Infusion Medications    sodium chloride      dextrose       Scheduled Medications    sacubitril-valsartan  0.5 tablet Oral BID    aspirin  81 mg Oral Daily    epoetin siddharth-epbx  5,400 Units IntraVENous Q MWF    insulin lispro  0-4 Units SubCUTAneous TID WC    insulin lispro  0-4 Units SubCUTAneous Nightly    [Held by provider] apixaban  5 mg Oral BID    [Held by provider] clopidogrel  75 mg Oral Daily    DULoxetine  30 mg Oral Daily    linaclotide  145 mcg Oral QAM AC    isosorbide dinitrate  20 mg Oral TID    pantoprazole  40 mg Oral QAM AC    pravastatin  40 mg Oral Daily    tiotropium-olodaterol  2 puff Inhalation Daily    QUEtiapine  50 mg Oral Nightly    metoprolol succinate  100 mg Oral BID    docusate sodium  100 mg Oral Daily    b complex-C-folic acid  1 capsule Oral Daily    sodium chloride flush  10 mL IntraVENous 2 times per day     PRN Meds: calcium carbonate, perflutren lipid microspheres, oxyCODONE-acetaminophen, heparin (porcine), albuterol sulfate HFA, cyclobenzaprine, ipratropium-albuterol, sodium chloride flush, sodium chloride, promethazine, acetaminophen **OR** acetaminophen, glucose, dextrose bolus **OR** dextrose bolus, glucagon (rDNA), dextrose    No intake or output data in the 24 hours ending 09/30/22 0856      Physical Exam Performed:    BP (!) 143/76   Pulse 56   Temp 98.8 °F (37.1 °C) (Oral)   Resp 16   Ht 5' 9\" (1.753 m)   Wt 238 lb 1.6 oz (108 kg)   SpO2 96%   BMI 35.16 kg/m²     General appearance: No apparent distress, appears stated age and cooperative. HEENT: Pupils equal, round, and reactive to light. Conjunctivae/corneas clear. Neck: Supple, with full range of motion. No jugular venous distention. Trachea midline. Respiratory:  Normal respiratory effort.  Clear to auscultation, bilaterally without Rales/Wheezes/Rhonchi. Cardiovascular: Regular rate and rhythm with normal S1/S2 without murmurs, rubs or gallops. Abdomen: Soft, non-tender, non-distended with normal bowel sounds. Musculoskeletal: No clubbing, cyanosis or edema bilaterally. Full range of motion without deformity. Skin: Skin color, texture, turgor normal.  No rashes or lesions. Neurologic:  Neurovascularly intact without any focal sensory/motor deficits. Cranial nerves: II-XII intact, grossly non-focal.  Psychiatric: Alert and oriented, thought content appropriate, normal insight  Capillary Refill: Brisk,< 3 seconds   Peripheral Pulses: +2 palpable, equal bilaterally       Labs:   Recent Labs     09/28/22  0506 09/29/22  0754 09/30/22  0502   WBC 6.1 9.0 7.8   HGB 10.6* 11.3* 10.5*   HCT 33.2* 35.9* 32.8*    300 298       Recent Labs     09/28/22  0506 09/29/22  0754 09/30/22  0502   * 129* 130*   K 5.2*  5.2* 5.4*  5.4* 4.6  4.6   CL 92* 91* 90*   CO2 16* 26 25   BUN 68* 51* 69*   CREATININE 7.7* 5.7* 7.4*   CALCIUM 8.4 8.8 8.7   PHOS 8.5* 6.1* 6.6*       No results for input(s): AST, ALT, BILIDIR, BILITOT, ALKPHOS in the last 72 hours. No results for input(s): INR in the last 72 hours. No results for input(s): Geraldo Lambert in the last 72 hours.       Urinalysis:      Lab Results   Component Value Date/Time    NITRU Negative 05/29/2022 12:51 PM    WBCUA 10-20 05/29/2022 12:51 PM    BACTERIA 3+ 05/29/2022 12:51 PM    RBCUA 5-10 05/29/2022 12:51 PM    BLOODU TRACE-INTACT 05/29/2022 12:51 PM    SPECGRAV 1.015 05/29/2022 12:51 PM    GLUCOSEU 100 05/29/2022 12:51 PM       Consults:    IP CONSULT TO HOSPITALIST  IP CONSULT TO HEART FAILURE NURSE/COORDINATOR  IP CONSULT TO DIETITIAN  IP CONSULT TO NEPHROLOGY  IP CONSULT TO CARDIOLOGY  IP CONSULT TO PALLIATIVE CARE  IP CONSULT TO PULMONOLOGY      Assessment/Plan:    Active Hospital Problems    Diagnosis     CAD (coronary artery disease) [I25.10]      Priority: High    CHF (congestive heart failure) ([facility]) [I50.9]      Priority: High    Pericardial effusion [I31.3]      Priority: Medium    SOB (shortness of breath) [R06.02]      Priority: Medium    Cardiomyopathy ([facility] [I42.9]      Priority: Medium    History of transcatheter aortic valve replacement (TAVR) [Z95.2]      Priority: Medium    Uncontrolled hypertension [I10]      Priority: Medium    Asthma-COPD overlap syndrome ([facility] [J44.9]      Priority: Medium    PAF (paroxysmal atrial fibrillation) ([facility]) [I48.0]     ESRD (end stage renal disease) on dialysis ([facility] [N18.6, Z99.2]     Pleural effusion on left [J90]     ZAINAB on CPAP [G47.33, Z99.89]     Obesity (BMI 30-39. 9) [E66.9]          CHF - acute on chronic combined sytolic/diastolic failure w/ reduced EF 20-25% by Echo June 2022. Likely due to hypertensive and ischemic heart disease. Continue current medical management and follow I/O as well as clinical response. W/ known CardioRenal syndrome. Cardiology consulted and appreciated. Nephrology consulted and appreciated. Acute on Chronic Respiratory Failure - w/ hypoxia, POArrival, likely 2nd to above. Baseline home O2 at 3L per [place]. Presence of clinical respiratory distress w/ tachypnea/dyspnea/SOB and wheezing w/ use of accessory muscles to breath. Supplemental O2 and wean as tolerated. Possible Thoracentesis off anticoagulation - last dose of Plavix AM 27 Sept     ESRD - on HD M/W/F. Nephrology consulted and appreciated. Anemia - likely 2nd to CKD, w/out evidence of active bleeding/hemolysis. Stable and asymptomatic w/out indication for transfusion. Follow serial labs. Reviewed and documented as above. HTN/CAD - w/ known CAD but no evidence of active signs/sxs of ischemia/failure. Currently controlled on home meds w/ vitals reviewed and documented as above. Afib - chronic paroxysmal of unspecified and clinically unable to determine etiology. Normally rate controlled on BBlocker - continued.   Anticoagulated at baseline on home Eliquis due to secondary hypercoaguable state due to Afib - currently held but anticipate resuming at discharge. Monitored on tele. Left Pleural effusion - w/ plan for  Thoracentesis off anticoagulation - last dose of Plavix AM 27 Sept      DM2 - diet controlled on home oral antiGlycemics/Insulin. Follow FSBS/SSI low regimen. Last HbA1c 7.2% dated this admission. Anticipate resuming/continuing home regimen at discharge. GERD - w/out active signs/sxs of dysphagia/odynophagia. No evidence of active PUD or hx of GI bleed. Controlled on home PPI - continue. Anxiety/Depression - controlled on home medications - continued. Obesity -  With Body mass index is 35.16 kg/m². Complicating assessment and treatment. Placing patient at risk for multiple co-morbidities as well as early death and contributing to the patient's presentation. Counseled on weight loss. ZAINAB - likely 2nd to obesity. Controlled on home CPAP/BiPap - continued, w/ outpatient f/u as previously arranged. DVT Prophylaxis: Eliquis - currently held     Recent Labs     09/28/22  0506 09/29/22  0754 09/30/22  0502    300 298       Diet: ADULT DIET; Regular  Code Status: Full Code      PT/OT Eval Status: seen and continuing to assess. Dispo - Patient is likely to remain in-house at least until Sat/Sunday 1/2 October, possibly longer pending clinical course, subspecialty recs and placement decision - tentatively [facility].        Azam Castro MD

## 2022-09-30 NOTE — PROGRESS NOTES
Nephrology Progress Note   [facility]. [facility]      This patient is a 45year old male whom we are following for ESKD. Subjective: The patient was seen and examined on dialysis. BP stable. Reports SOB is only a bit better. Family History: No family at bedside  ROS: No nausea or vomiting      Vitals:  /75   Pulse 55   Temp 98.6 °F (37 °C)   Resp 16   Ht 5' 9\" (1.753 m)   Wt 236 lb 15.9 oz (107.5 kg)   SpO2 96%   BMI 35.00 kg/m²   I/O last 3 completed shifts: In: 120 [P.O.:120]  Out: 25 [Urine:25]  No intake/output data recorded. Physical Exam  Vitals reviewed. Constitutional:       General: He is not in acute distress. Appearance: Normal appearance. HENT:      Head: Normocephalic and atraumatic. Eyes:      General: No scleral icterus. Conjunctiva/sclera: Conjunctivae normal.   Cardiovascular:      Rate and Rhythm: Normal rate. Heart sounds: No friction rub. Pulmonary:      Effort: Pulmonary effort is normal. No respiratory distress. Comments: Decreased BS L mid-base, R lung base  Abdominal:      General: Bowel sounds are normal. There is no distension. Tenderness: There is no abdominal tenderness. Musculoskeletal:      Right lower leg: Edema present. Left lower leg: Edema present. Neurological:      Mental Status: He is alert.      Access: IJ TDC      Medications:   sacubitril-valsartan  0.5 tablet Oral BID    aspirin  81 mg Oral Daily    epoetin siddharth-epbx  5,400 Units IntraVENous Q MWF    insulin lispro  0-4 Units SubCUTAneous TID WC    insulin lispro  0-4 Units SubCUTAneous Nightly    [Held by provider] apixaban  5 mg Oral BID    [Held by provider] clopidogrel  75 mg Oral Daily    DULoxetine  30 mg Oral Daily    linaclotide  145 mcg Oral QAM AC    isosorbide dinitrate  20 mg Oral TID    pantoprazole  40 mg Oral QAM AC    pravastatin  40 mg Oral Daily    tiotropium-olodaterol  2 puff Inhalation Daily    QUEtiapine  50 mg Oral Nightly    metoprolol succinate  100 mg Oral BID    docusate sodium  100 mg Oral Daily    b complex-C-folic acid  1 capsule Oral Daily    sodium chloride flush  10 mL IntraVENous 2 times per day         Labs:  Recent Labs     09/28/22  0506 09/29/22  0754 09/30/22  0502   WBC 6.1 9.0 7.8   HGB 10.6* 11.3* 10.5*   HCT 33.2* 35.9* 32.8*   MCV 88.3 89.0 89.3    300 298     Recent Labs     09/28/22  0506 09/29/22  0754 09/30/22  0502   * 129* 130*   K 5.2*  5.2* 5.4*  5.4* 4.6  4.6   CL 92* 91* 90*   CO2 16* 26 25   GLUCOSE 134* 175* 148*   PHOS 8.5* 6.1* 6.6*   MG  --  2.00  --    BUN 68* 51* 69*   CREATININE 7.7* 5.7* 7.4*   LABGLOM 7* 10* 8*   GFRAA 9* 13* 9*       Assessment/      - End stage kidney disease - on HD Mon-Wed-Fri.      - NSTEMI     - Hypertension     - s/p TAVR     - Left pleural effusion - increased in size from CT chest on 9/28     - COPD/ZAINAB - requiring bi-level     - Anemia of chronic disease - DVAON with HD     - Hyperkalemia. Plan/      - Extra HD session tomorrow for more fluid removal. Outpatient .5 kg, challenge as tolerated. - DAVON with HD.  - Trend labs, bp's    Discussed with Dr. Rose Mary Garcia. Plan for thoracentesis next week. Please do not hesitate to contact me at [phone] if with questions. Thank you! Jessie Foster MD  [facility]  9/30/2022

## 2022-10-01 LAB
ALBUMIN SERPL-MCNC: 3.5 G/DL (ref 3.4–5)
ANION GAP SERPL CALCULATED.3IONS-SCNC: 13 MMOL/L (ref 3–16)
BUN BLDV-MCNC: 47 MG/DL (ref 7–20)
CALCIUM SERPL-MCNC: 8.9 MG/DL (ref 8.3–10.6)
CHLORIDE BLD-SCNC: 91 MMOL/L (ref 99–110)
CO2: 23 MMOL/L (ref 21–32)
CREAT SERPL-MCNC: 5 MG/DL (ref 0.9–1.3)
GFR AFRICAN AMERICAN: 15
GFR NON-AFRICAN AMERICAN: 12
GLUCOSE BLD-MCNC: 160 MG/DL (ref 70–99)
GLUCOSE BLD-MCNC: 165 MG/DL (ref 70–99)
GLUCOSE BLD-MCNC: 181 MG/DL (ref 70–99)
GLUCOSE BLD-MCNC: 184 MG/DL (ref 70–99)
GLUCOSE BLD-MCNC: 188 MG/DL (ref 70–99)
HCT VFR BLD CALC: 33.3 % (ref 40.5–52.5)
HEMOGLOBIN: 10.8 G/DL (ref 13.5–17.5)
MCH RBC QN AUTO: 28.9 PG (ref 26–34)
MCHC RBC AUTO-ENTMCNC: 32.5 G/DL (ref 31–36)
MCV RBC AUTO: 89.1 FL (ref 80–100)
PDW BLD-RTO: 15.8 % (ref 12.4–15.4)
PERFORMED ON: ABNORMAL
PHOSPHORUS: 4 MG/DL (ref 2.5–4.9)
PLATELET # BLD: 281 K/UL (ref 135–450)
PMV BLD AUTO: 7.8 FL (ref 5–10.5)
POTASSIUM SERPL-SCNC: 4.9 MMOL/L (ref 3.5–5.1)
RBC # BLD: 3.74 M/UL (ref 4.2–5.9)
SODIUM BLD-SCNC: 127 MMOL/L (ref 136–145)
WBC # BLD: 8.1 K/UL (ref 4–11)

## 2022-10-01 PROCEDURE — 36415 COLL VENOUS BLD VENIPUNCTURE: CPT

## 2022-10-01 PROCEDURE — 6370000000 HC RX 637 (ALT 250 FOR IP): Performed by: INTERNAL MEDICINE

## 2022-10-01 PROCEDURE — 90935 HEMODIALYSIS ONE EVALUATION: CPT

## 2022-10-01 PROCEDURE — 94660 CPAP INITIATION&MGMT: CPT

## 2022-10-01 PROCEDURE — 2060000000 HC ICU INTERMEDIATE R&B

## 2022-10-01 PROCEDURE — 6370000000 HC RX 637 (ALT 250 FOR IP): Performed by: NURSE PRACTITIONER

## 2022-10-01 PROCEDURE — 80069 RENAL FUNCTION PANEL: CPT

## 2022-10-01 PROCEDURE — 99232 SBSQ HOSP IP/OBS MODERATE 35: CPT | Performed by: INTERNAL MEDICINE

## 2022-10-01 PROCEDURE — 99232 SBSQ HOSP IP/OBS MODERATE 35: CPT | Performed by: NURSE PRACTITIONER

## 2022-10-01 PROCEDURE — 85027 COMPLETE CBC AUTOMATED: CPT

## 2022-10-01 PROCEDURE — 6360000002 HC RX W HCPCS

## 2022-10-01 RX ORDER — HEPARIN SODIUM 1000 [USP'U]/ML
INJECTION, SOLUTION INTRAVENOUS; SUBCUTANEOUS
Status: COMPLETED
Start: 2022-10-01 | End: 2022-10-01

## 2022-10-01 RX ADMIN — PRAVASTATIN SODIUM 40 MG: 40 TABLET ORAL at 08:05

## 2022-10-01 RX ADMIN — OXYCODONE AND ACETAMINOPHEN 1 TABLET: 7.5; 325 TABLET ORAL at 16:45

## 2022-10-01 RX ADMIN — METOPROLOL SUCCINATE 100 MG: 50 TABLET, EXTENDED RELEASE ORAL at 20:14

## 2022-10-01 RX ADMIN — OXYCODONE AND ACETAMINOPHEN 1 TABLET: 7.5; 325 TABLET ORAL at 08:05

## 2022-10-01 RX ADMIN — DOCUSATE SODIUM 100 MG: 100 CAPSULE, LIQUID FILLED ORAL at 08:06

## 2022-10-01 RX ADMIN — DULOXETINE HYDROCHLORIDE 30 MG: 30 CAPSULE, DELAYED RELEASE ORAL at 08:05

## 2022-10-01 RX ADMIN — HEPARIN SODIUM: 1000 INJECTION, SOLUTION INTRAVENOUS; SUBCUTANEOUS at 11:39

## 2022-10-01 RX ADMIN — QUETIAPINE FUMARATE 50 MG: 25 TABLET ORAL at 20:14

## 2022-10-01 RX ADMIN — ISOSORBIDE DINITRATE 20 MG: 20 TABLET ORAL at 13:47

## 2022-10-01 RX ADMIN — SACUBITRIL AND VALSARTAN 0.5 TABLET: 24; 26 TABLET, FILM COATED ORAL at 20:13

## 2022-10-01 RX ADMIN — SACUBITRIL AND VALSARTAN 0.5 TABLET: 24; 26 TABLET, FILM COATED ORAL at 11:35

## 2022-10-01 RX ADMIN — PANTOPRAZOLE SODIUM 40 MG: 40 TABLET, DELAYED RELEASE ORAL at 05:03

## 2022-10-01 RX ADMIN — NEPHROCAP 1 MG: 1 CAP ORAL at 08:05

## 2022-10-01 RX ADMIN — ASPIRIN 81 MG 81 MG: 81 TABLET ORAL at 08:05

## 2022-10-01 RX ADMIN — ISOSORBIDE DINITRATE 20 MG: 20 TABLET ORAL at 20:14

## 2022-10-01 RX ADMIN — METOPROLOL SUCCINATE 100 MG: 50 TABLET, EXTENDED RELEASE ORAL at 08:05

## 2022-10-01 ASSESSMENT — PAIN SCALES - GENERAL
PAINLEVEL_OUTOF10: 9
PAINLEVEL_OUTOF10: 9
PAINLEVEL_OUTOF10: 5
PAINLEVEL_OUTOF10: 9

## 2022-10-01 ASSESSMENT — PAIN DESCRIPTION - DESCRIPTORS
DESCRIPTORS: ACHING

## 2022-10-01 ASSESSMENT — PAIN DESCRIPTION - ORIENTATION
ORIENTATION: RIGHT;LEFT;LOWER
ORIENTATION: LOWER

## 2022-10-01 ASSESSMENT — PAIN DESCRIPTION - LOCATION
LOCATION: BACK

## 2022-10-01 NOTE — PLAN OF CARE
Patient's EF (Ejection Fraction) is less than 40%    Heart Failure Medications:  Diuretics[de-identified] Furosemide    (One of the following REQUIRED for EF </= 40%/SYSTOLIC FAILURE but MAY be used in EF% >40%/DIASTOLIC FAILURE)        ACE[de-identified] None        ARB[de-identified] None         ARNI[de-identified] Sacubitril/Valsartan-Entresto    (Beta Blockers)  NON- Evidenced Based Beta Blocker (for EF% >40%/DIASTOLIC FAILURE): Metoprolol TARTrate- Lopressor    Evidenced Based Beta Blocker::(REQUIRED for EF% <40%/SYSTOLIC FAILURE) Metoprolol SUCCinate- Toprol XL  . .................................................................................................................................................. Patient's weights and intake/output reviewed: Yes    Patient's Last Weight:227.11  lb  lbs obtained by standing scale. Difference of 0 lbs  0  than last documented weight.       Intake/Output Summary (Last 24 hours) at 10/1/2022 1710  Last data filed at 10/1/2022 0843  Gross per 24 hour   Intake 955 ml   Output 0 ml   Net 955 ml       Education Booklet Provided: yes    Comorbidities Reviewed Yes    Patient has a past medical history of Ambulatory dysfunction, Aortic stenosis, Arthritis, Asthma, Bilateral hilar adenopathy syndrome, CAD (coronary artery disease), Cardiomyopathy ([address] CHF (congestive heart failure) ([address] Chronic pain, COPD (chronic obstructive pulmonary disease) ([address] Depression, Diabetes mellitus ([address] Difficult intravenous access, Emphysema of lung ([address] ESRD (end stage renal disease) on dialysis ([address] Fear of needles, Gastric ulcer, GERD (gastroesophageal reflux disease), Heart valve problem, Hemodialysis patient ([facility] History of spinal fracture, Hx of blood clots, Hyperlipidemia, Hypertension, MI (myocardial infarction) ([facility] Neuromuscular disorder ([facility] Numbness and tingling in left arm, Pneumonia, PONV (postoperative nausea and vomiting), Prolonged emergence from general anesthesia, Sleep apnea, Stroke ([address] TIA (transient ischemic attack), and Unspecified diseases of blood and blood-forming organs. >>For CHF and Comorbidity documentation on Education Time and Topics, please see Education Tab    Progressive Mobility Assessment:  What is this patient's Current Level of Mobility?: Ambulatory-Up [place]  How was this patient Mobilized today?: Up to Chair,  Up to Toilet/Shower, and Up in Room, ambulated 15 ft                 With Whom? Self                 Level of Difficulty/Assistance: Independent     Pt resting in bed at this time on room air. Pt denies shortness of breath. Pt without lower extremity edema.      Patient and/or Family's stated Goal of Care this Admission: reduce shortness of breath, increase activity tolerance, better understand heart failure and disease management, be more comfortable, and reduce lower extremity edema prior to discharge        :

## 2022-10-01 NOTE — PROGRESS NOTES
09/30/22 2128   NIV Type   Skin Protection for O2 Device No  (pt declines mepilex for nose)   NIV Started/Stopped On   Equipment Type v60   Mode Bilevel   Mask Type Full face mask   Settings/Measurements   PIP Observed 20 cm H20   IPAP 18 cmH20   CPAP/EPAP 6 cmH2O   Vt (Measured) 421 mL   Rate Ordered 14   Resp 19   Insp Rise Time (%) 1 %   FiO2  40 %   I Time/ I Time % 1 s   Minute Volume (L/min) 7.8 Liters   Mask Leak (lpm) 50 lpm  (beard)   Comfort Level Good   Using Accessory Muscles No   SpO2 97   Patient's Home Machine No   Alarm Settings   Alarms On Y   Low Pressure (cmH2O) 6 cmH2O   High Pressure (cmH2O) 30 cmH2O   Delay Alarm 20 sec(s)   RR Low (bpm) 6   RR High (bpm) 40 br/min

## 2022-10-01 NOTE — PROGRESS NOTES
Treatment time: 2.5 hours  Net UF: 3100 ml     Pre weight: 106.4 kg  Post weight:103.3 kg  EDW: 103.5 kg    Access used: L CVC    Access function: well with  ml/min     Medications or blood products given: heparin dwells     Regular outpatient schedule: MWF     Summary of response to treatment: Patient tolerated treatment well and without any complications.  Patient remained stable throughout entire treatment       10/01/22 0854 10/01/22 1120   Treatment   Time On 0846  --    Time Off  --  1116   Treatment Goal  --  3100   Vital Signs   /71 122/70   Temp 97.8 °F (36.6 °C) 97.9 °F (36.6 °C)   Heart Rate 65 58   Resp 16 16   Weight 234 lb 9.1 oz (106.4 kg) 227 lb 11.8 oz (103.3 kg)   Dialysis Bath   K+ (Potassium) 2  --    Ca+ (Calcium) 2.5  --    Na+ (Sodium) 138  --    HCO3 (Bicarb) 35  --

## 2022-10-01 NOTE — PLAN OF CARE
Problem: Discharge Planning  Goal: Discharge to home or other facility with appropriate resources  Outcome: Progressing     Problem: Pain  Goal: Verbalizes/displays adequate comfort level or baseline comfort level  Outcome: Progressing     Problem: Chronic Conditions and Co-morbidities  Goal: Patient's chronic conditions and co-morbidity symptoms are monitored and maintained or improved  Outcome: Progressing     Problem: Safety - Adult  Goal: Free from fall injury  Outcome: Progressing     Problem: ABCDS Injury Assessment  Goal: Absence of physical injury  Outcome: Progressing     Problem: Skin/Tissue Integrity  Goal: Absence of new skin breakdown  Description: 1. Monitor for areas of redness and/or skin breakdown  2. Assess vascular access sites hourly  3. Every 4-6 hours minimum:  Change oxygen saturation probe site  4. Every 4-6 hours:  If on nasal continuous positive airway pressure, respiratory therapy assess nares and determine need for appliance change or resting period. Outcome: Progressing     Problem: Discharge Planning  Goal: Discharge to home or other facility with appropriate resources  Outcome: Progressing     Problem: Pain  Goal: Verbalizes/displays adequate comfort level or baseline comfort level  Outcome: Progressing     Problem: Chronic Conditions and Co-morbidities  Goal: Patient's chronic conditions and co-morbidity symptoms are monitored and maintained or improved  Outcome: Progressing     Problem: Safety - Adult  Goal: Free from fall injury  Outcome: Progressing     Problem: ABCDS Injury Assessment  Goal: Absence of physical injury  Outcome: Progressing     Problem: Skin/Tissue Integrity  Goal: Absence of new skin breakdown  Description: 1. Monitor for areas of redness and/or skin breakdown  2. Assess vascular access sites hourly  3. Every 4-6 hours minimum:  Change oxygen saturation probe site  4.   Every 4-6 hours:  If on nasal continuous positive airway pressure, respiratory therapy assess nares and determine need for appliance change or resting period.   Outcome: Progressing

## 2022-10-01 NOTE — PROGRESS NOTES
[facility]   Daily Progress Note    Admit Date:  9/23/2022  HPI:    Chief Complaint   Patient presents with    Respiratory Distress     Squad reporting pt called c/o SOB x 3 days. Only received 45min of dialysis today due to feeling SOB. Squad reports pt was tripoding upon their arrival with a RA sat of 96%. They report patient was not moving any air. 1 duoneb and 1 albuterol given in route to the ED along with BIPAP. Interval history: Zoe Moreno is being followed for CHF, shortness of breath, elevated troponin. Subjective:  Mr. Radha Hunt continues with shortness of breath. Chest pressures left side different from his prior heart pains.    Breathing improves with bipap   Objective:   /62   Pulse 62   Temp 97.9 °F (36.6 °C) (Oral)   Resp 17   Ht 5' 9\" (1.753 m)   Wt 234 lb 8 oz (106.4 kg)   SpO2 98%   BMI 34.63 kg/m²     Intake/Output Summary (Last 24 hours) at 10/1/2022 0716  Last data filed at 10/1/2022 0501  Gross per 24 hour   Intake 1055 ml   Output 3500 ml   Net -2445 ml       NYHA: IV    Physical Exam:  General:  Awake, alert, NAD, appears older than stated age,   Skin:  Warm and dry  Neck:  JVD difficult to assess   Chest: clear right side, dim left side   Telemetry: not currently on tele   Cardiovascular:  RRR S1S2, no m/r/g   Abdomen:  Soft, nontender, +bowel sounds  Extremities:  no  bilateral lower extremity edema    Medications:    sacubitril-valsartan  0.5 tablet Oral BID    aspirin  81 mg Oral Daily    epoetin siddharth-epbx  5,400 Units IntraVENous Q MWF    insulin lispro  0-4 Units SubCUTAneous TID WC    insulin lispro  0-4 Units SubCUTAneous Nightly    [Held by provider] apixaban  5 mg Oral BID    [Held by provider] clopidogrel  75 mg Oral Daily    DULoxetine  30 mg Oral Daily    linaclotide  145 mcg Oral QAM AC    isosorbide dinitrate  20 mg Oral TID    pantoprazole  40 mg Oral QAM AC    pravastatin  40 mg Oral Daily    tiotropium-olodaterol  2 puff Inhalation Daily    QUEtiapine  50 mg Oral Nightly    metoprolol succinate  100 mg Oral BID    docusate sodium  100 mg Oral Daily    b complex-C-folic acid  1 capsule Oral Daily    sodium chloride flush  10 mL IntraVENous 2 times per day      sodium chloride      dextrose         Lab Data:  CBC:   Recent Labs     22  0754 22  0502 10/01/22  0458   WBC 9.0 7.8 8.1   HGB 11.3* 10.5* 10.8*    298 281     BMP:    Recent Labs     22  0754 22  0502 10/01/22  0458   * 130* 127*   K 5.4*  5.4* 4.6  4.6 4.9   CO2 26 25 23   BUN 51* 69* 47*   CREATININE 5.7* 7.4* 5.0*     INR:  No results for input(s): INR in the last 72 hours. BNP:    Recent Labs     22   PROBNP 57,989*       Lab Results   Component Value Date    LVEF 20 2022    LVEFMODE Echo 10/16/2019       Testing:    Procedure Date:  2022  Patient Name: Doreen Wan  MRN: [id number]      : 1970        INDICATION      High risk abnormal stress test  Ischemic or mildly  Non-STEMI     PROCEDURES PERFORMED      Right heart catheterization  Left heart catheterization  LVgram  Coronary angiogam  Coronary cath  Monitoring of moderate conscious sedation              PROCEDURE DESCRIPTION   Risks/benefits/alternatives/outcomes were discussed with patient and/or family and informed consent was obtained. Using the [facility] scale, the patient's right radial artery was found to be a level B. Patient was prepped draped in the usual sterile fashion. Local anaesthetic was applied over puncture site. Using ultrasound guidance and a front wall technique, a 4/5 Yi Terumo sheath was inserted into right radial artery. Verapamil, nitroglycerin, nicardipine were administered through the sheath. Heparin was administered. Diagnostic 5 [place] pigtail, TIG, Binu catheters were used for diagnostic angiograms.   At the conclusion of the procedure, a TR band was placed over the puncture site and hemostasis was obtained. There were no immediate complications. I supervised sedation from 9:53 AM to 11:08 AM with versed 5 mg/fentanyl 125 mcg during the procedure. An independent trained observer pushed meds at my direction. We monitored the patient's level of consciousness and vital signs/physiologic status throughout the procedure duration (see times listed previously). 205 cc contrast was utilized. <20cc EBL. Case/findings/plans discussed with patient's wife, she understood/agreed. FINDINGS      HEMODYNAMICS     CHAMBER PRESSURE SATURATION   RA  10 60%   RV  47/9     PA  50/20  67%   PW  19     AORTA  101/50  95%      NANCY CARDIAC OUTPUT  5.8 L/min   SVR  756    PVR  234             LVGRAM     LVEDP  21   GRADIENT ACROSS AORTIC VALVE  less than 5 mmHg   LV FUNCTION EF 20%   WALL MOTION  severe global hypokinesis with regional variation   MITRAL REGURGITATION  mild         CORONARY ARTERIES     LM Less than 10% ucrzwutl-jvn-atkcyw stenosis            LAD Moderately calcified, 20 to 30% proximal-mid stenosis, distal vessel tapers at the apex with 60% diffuse disease. D1 has an ostial/proximal 75 to 80% stenosis. LCX  Calcified, proximal-mid 75 to 80% stenosis. Distal vessel is tortuous with 70 to 85% diffuse stenosis with more discrete stenosis noted distally. OM 1 is a very small vessel with ostial/proximal 80% stenosis and 60 to 70% diffuse disease in the gzzdznzb-zvc-ishrdj vessel. Davie Mering RCA Dominant, calcified, tortuous, there is a proximal-mid stent with 60 to 70% in-stent restenosis. Distal vessel 20 to 30% stenosis         CONCLUSIONS:      Mild to moderately increased filling pressures  Patent RCA stent with moderate to borderline severe in-stent restenosis  Severe circumflex and D1 stenosis  Continue medical management, consider outpatient high risk PCI of above territories pending outpatient clinical follow-up.   Currently medical management seems best given comorbidities and need for additional fluid removal.  If PCI is pursued, will likely need general anesthesia. Patient had difficulty lying still on Cath Lab table. Okay to resume Eliquis tomorrow, case/plan/findings discussed with patient and wife, they understand/agree, they stated it is incorrectly stated in chart the patient would refuse blood, he is okay to receive blood products if needed. Continue beta-blocker and Entresto    Echo 5/29/22   Summary   Definity® used for myocardial border enhancement. Left ventricular systolic function is severely reduced with a visually   estimated ejection fraction of 20-25 %. EF estimated by Jasmine's method at 24 %. The left ventricle is mildly dilated in size with normal wall thickness. Severe global hypokinesis with regional variation. Grade I diastolic dysfunction with normal LV pressure. There is no evidence of a left ventricular thrombus. Right ventricular systolic function is reduced. A 29 mm Langford Leyda S3 bioprosthetic aortic valve appears well seated   with normal Doppler values. Inadequate tricuspid valve regurgitation to estimate systolic pulmonary   artery pressure. There is a moderate left pleural effusion noted. Echo 9/28/22   Summary   Patient refused use of Definity contrast.   Limited only f/u for LVEF and s/p TAVR. The left ventricular systolic function appears severely reduced with   visually estimated ejection fraction of 20-25%. Poor endocardial visualization. By history, there is a #29 mm Langford Leyda S3 bioprosthetic valve in the   aortic position. The valve appears well seated with no rocking motion. Valve   leaflets are not well-evaluated. Normal Doppler values. No evidence of aortic valve regurgitation. Pericardial effusion, noted darrick-apically where measures up to 1.5 cm,   anteriorly along right ventricle, with exudate. Appears likely trace along   lateral wall.  Difficult to discern apically/laterally however, whether   pericardial or tracking pleural effusion. No tamponade physiology. There is a large left pleural effusion. Non-contrasted CT is suggested for further characterization of pericardial   versus pleural effusion. CHest CT 9/28/22   Impression:       1. Increased size of a large left pleural effusion with adjacent   consolidation in the left upper and lower lobes, particularly the lower lobe. Airspace consolidation could represent atelectasis related to the effusion or   pneumonia. 2. Stable 3 mm left upper lobe lung nodule. If patient is considered high   risk, a 1 year follow-up chest CT is suggested. Principal Problem:    CHF (congestive heart failure) ([facility])  Active Problems:    CAD (coronary artery disease)    Asthma-COPD overlap syndrome (HCC)    Uncontrolled hypertension    History of transcatheter aortic valve replacement (TAVR)    Cardiomyopathy (HCC)    SOB (shortness of breath)    Pericardial effusion    Obesity (BMI 30-39.9)    ZAINAB on CPAP    Pleural effusion on left    ESRD (end stage renal disease) on dialysis (HCC)    PAF (paroxysmal atrial fibrillation) ([address]  Resolved Problems:    * No resolved hospital problems. *      Assessment:  Shortness of breath  ELevated troponin- chronically elevated    PAF  Multivessel CAD; CAD s/p PCI to LAD in 2015, RCA 1/2022   - severe LCx and D1 stenosis 6/2022 - med mgmt   AS S/P TAVR 2019  Ishcemic cardiomyopathy  HTN, DM, HLD  PAD s/p PTA right iliac artery  Left pleural effusion - large with consolidation   ESRD on HD  ZAINAB   Hx of CVA      Plan: Will hold plavix for anticipation of a possible thoracentesis   baby aspirin in the interim while holding plavix (held since 9/27/22) and eliquis (discussed with dr. Coy Weeks)   Volume control HD  Toprol 100mg BID  Entresto 24-26mg BID 1/2 tab due to higher potassium   Isordil 20mg TID  Eliquis on hold for possible procedures    Plans for thoracentesis on Monday; cardiology will follow peripherally. Alfa Loera, APRN - CNP,  10/1/2022, 7:58 AM

## 2022-10-01 NOTE — PLAN OF CARE
Problem: Discharge Planning  Goal: Discharge to home or other facility with appropriate resources  Outcome: Progressing  Flowsheets (Taken 9/30/2022 5642 by Pauline Nam, RN)  Discharge to home or other facility with appropriate resources: Identify barriers to discharge with patient and caregiver     Problem: Pain  Goal: Verbalizes/displays adequate comfort level or baseline comfort level  Outcome: Progressing     Problem: Chronic Conditions and Co-morbidities  Goal: Patient's chronic conditions and co-morbidity symptoms are monitored and maintained or improved  Outcome: Progressing  Flowsheets (Taken 9/30/2022 09 by Pauline Nam, RN)  Care Plan - Patient's Chronic Conditions and Co-Morbidity Symptoms are Monitored and Maintained or Improved: Monitor and assess patient's chronic conditions and comorbid symptoms for stability, deterioration, or improvement     Problem: Safety - Adult  Goal: Free from fall injury  Outcome: Progressing     Problem: ABCDS Injury Assessment  Goal: Absence of physical injury  Outcome: Progressing

## 2022-10-01 NOTE — PROGRESS NOTES
10/01/22 0017   NIV Type   Skin Protection for O2 Device No  (pt declines mepilex for nose)   NIV Started/Stopped On   Equipment Type v60   Mode Bilevel   Mask Type Full face mask   Settings/Measurements   PIP Observed 19 cm H20   IPAP 18 cmH20   CPAP/EPAP 6 cmH2O   Vt (Measured) 976 mL   Rate Ordered 14   Resp 20   Insp Rise Time (%) 1 %   FiO2  30 %   I Time/ I Time % 1 s   Minute Volume (L/min) 19.2 Liters   Mask Leak (lpm) 35 lpm   Comfort Level Good   Using Accessory Muscles No   SpO2 95   Patient's Home Machine No   Alarm Settings   Alarms On Y   Low Pressure (cmH2O) 6 cmH2O   High Pressure (cmH2O) 30 cmH2O   Delay Alarm 20 sec(s)   RR Low (bpm) 6   RR High (bpm) 40 br/min

## 2022-10-01 NOTE — PROGRESS NOTES
INPATIENT PULMONARY CRITICAL CARE PROGRESS NOTE      Reason for visit    Left pleural effusion     SUBJECTIVE: Patient partner cycle of hemofiltration without any removal of fluid this morning, patient has some cough with mucoid expectoration, patient continues to have shortness of breath, patient does not have any chest pain or palpitations, patient was afebrile and he medically maintained, patient was on 3 L of nasal cannula oxygen with saturation of 96%, patient blood sugars are slightly on the high side, patient's cumulative fluid balance is -5.4 L, patient was alert and oriented, no other pertinent review of system of concern      Physical Exam:  Blood pressure 135/71, pulse 65, temperature 97.8 °F (36.6 °C), resp. rate 16, height 5' 9\" (1.753 m), weight 234 lb 9.1 oz (106.4 kg), SpO2 96 %.'     Constitutional: No respiratory distress   HENT: No facial asymmetry no thyromegaly. Eyes:  Conjunctivae are normal. Pupils equal, round, and reactive to light. No scleral icterus. Neck: . No tracheal deviation present. No obvious thyroid mass. Short large neck  Cardiovascular: Normal rate, regular rhythm, normal heart sounds. No right ventricular heave. some lower extremity edema. Pulmonary/Chest: No wheezes. Decreased bilateral rales. Chest wall is not dull to percussion. No accessory muscle usage or stridor. Decreased breath sound intensity(L>R)  Abdominal: Soft. Bowel sounds present. No distension or hernia. No tenderness. Obese  Musculoskeletal: No cyanosis. No clubbing. No obvious joint deformity. Lymphadenopathy: No cervical or supraclavicular adenopathy. Skin: Skin is warm and dry. No rash or nodules on the exposed extremities.   Psychiatric: Normal reaction ;no anxiety   Neurologic Alert and communicative with no focal cranial deficits    Results:  CBC:   Recent Labs     09/29/22  0754 09/30/22  0502 10/01/22  0458   WBC 9.0 7.8 8.1   HGB 11.3* 10.5* 10.8*   HCT 35.9* 32.8* 33.3*   MCV 89.0 89.3 89.1    298 281       BMP:   Recent Labs     09/29/22  0754 09/30/22  0502 10/01/22  0458   * 130* 127*   K 5.4*  5.4* 4.6  4.6 4.9   CL 91* 90* 91*   CO2 26 25 23   PHOS 6.1* 6.6* 4.0   BUN 51* 69* 47*   CREATININE 5.7* 7.4* 5.0*           Imaging:  I have reviewed radiology images personally. CT CHEST WO CONTRAST   Final Result   1. Increased size of a large left pleural effusion with adjacent   consolidation in the left upper and lower lobes, particularly the lower lobe. Airspace consolidation could represent atelectasis related to the effusion or   pneumonia. 2. Stable 3 mm left upper lobe lung nodule. If patient is considered high   risk, a 1 year follow-up chest CT is suggested. RECOMMENDATIONS:   Lung nodule Fleischner Society guidelines for follow-up and management of   incidentally detected pulmonary nodules:      Single Solid Nodule:      ~Nodule size less than 6 mm. In a low-risk patient, no routine follow-up. In a high-risk patient, optional CT at 12 months. Radiology 2017 http://pubs. rsna.org/doi/full/10.1148/radiol. 3889334278         XR CHEST (2 VW)   Final Result   1. Diffuse opacity lower 2/3 left hemithorax obscuring the left hilum, left   heart margin and left hemidiaphragm slightly increased since prior study in   keeping with consolidation and pleural effusion. 2. Right lung appears clear. 3. Stable left IJ hemodialysis catheter, tip at the superior cavoatrial   junction level. XR CHEST PORTABLE   Final Result   Moderate left pleural effusion with associated left lower lobe airspace   opacities.            Echo Limited    Result Date: 9/28/2022  Transthoracic Echocardiography Report (TTE)  Demographics   Patient Name       Maria Ines Deer   Date of Study      09/28/2022         Gender               Male   Patient Number     [id number]         Date of Birth        1970   Visit Number       [id number]          Age                  45 year(s) Accession Number   [id number]         Room Number          2936   Corporate ID       [id number]            Sonographer          Bakari Fuentes RT   Ordering Physician Ky Saez, CNP             Physician            Nisha Sinclair MD  Procedure Type of Study   TTE procedure:ECHOCARDIOGRAM LIMITED. Procedure Date Date: 09/28/2022 Start: 07:01 AM Study Location: [facility] - Echo Lab Technical Quality: Adequate visualization Indications:Dyspnea/SOB. Patient Status: Routine Height: 69 inches Weight: 235.01 pounds BSA: 2.21 m2 BMI: 34.7 kg/m2 BP: 101/60 mmHg  Conclusions   Summary  Patient refused use of Definity contrast.  Limited only f/u for LVEF and s/p TAVR. The left ventricular systolic function appears severely reduced with  visually estimated ejection fraction of 20-25%. Poor endocardial visualization. By history, there is a #29 mm Langford Leyda S3 bioprosthetic valve in the  aortic position. The valve appears well seated with no rocking motion. Valve  leaflets are not well-evaluated. Normal Doppler values. No evidence of aortic valve regurgitation. Pericardial effusion, noted darrick-apically where measures up to 1.5 cm,  anteriorly along right ventricle, with exudate. Appears likely trace along  lateral wall. Difficult to discern apically/laterally however, whether  pericardial or tracking pleural effusion. No tamponade physiology. There is a large left pleural effusion. Non-contrasted CT is suggested for further characterization of pericardial  versus pleural effusion.    Signature   ------------------------------------------------------------------  Electronically signed by Nisha Sinclair MD (Interpreting  physician) on 09/28/2022 at 11:29 AM  ------------------------------------------------------------------   Findings   Left Ventricle  The left ventricular systolic function appears severely reduced with  visually estimated ejection fraction of 20-25%. Poor endocardial visualization. Aortic Valve  By history, there is a #29 mm Langford Leyda S3 bioprosthetic valve in the  aortic position. The valve appears well seated with no rocking motion. Valve  leaflets are not well-evaluated. Normal Doppler values. There is a maximum gradient of 18 mmHg, a mean gradient of 8 mmHg, and the  valve area is calculated at 2.13 cm2 by the continuity equation. No evidence of aortic valve regurgitation. Pericardial Effusion  Pericardial effusion, noted darrick-apically where measures up to 1.5 cm,  anteriorly along right ventricle, with exudate. Appears likely trace along  lateral wall. Difficult to discern apically/laterally however, whether  pericardial or tracking pleural effusion. No IVC plethora, chamber collapse,  or respiration flow variation. Pleural Effusion  There is a large left pleural effusion. Miscellaneous  IVC size is normal (<2.1cm) and collapses > 50% with respiration consistent  with normal RA pressure (3mmHg). M-Mode/2D Measurements (cm)                AO Root Dimension: 2.2 cm               AV Cusp Separation: 1.5 cm  LVOT: 2.6 cm  Doppler Measurements   AV Peak Velocity: 211 cm/s  AV Peak Gradient: 17.81 mmHg  AV Mean Gradient: 8 mmHg  LVOT Peak Velocity: 83.6 cm/s  AV Area (Continuity):2.26 cm2   Aortic Valve   Peak Velocity: 211 cm/s     Mean Velocity: 129 cm/s  Peak Gradient: 17.81 mmHg   Mean Gradient: 8 mmHg  Area (continuity): 2.26 cm2  AV VTI: 42.3 cm   Cusp Separation: 1.5 cm  Aorta   Aortic Root: 2.2 cm  LVOT Diameter: 2.6 cm      XR CHEST (2 VW)    Result Date: 9/27/2022  EXAMINATION: TWO XRAY VIEWS OF THE CHEST 9/27/2022 3:17 pm COMPARISON: Chest 09/23/2022 HISTORY: ORDERING SYSTEM PROVIDED HISTORY: evaluate left pleural effusion Reason for Exam: f/u FINDINGS: *4 images are presented.  *Diffuse opacity lower 2/3 left hemithorax obscuring the left hilum, left heart margin and left hemidiaphragm slightly increased since prior study in keeping with consolidation and pleural effusion. *Right lung appears clear. *Stable left IJ hemodialysis catheter, tip at the superior cavoatrial junction level. *No acute osseous abnormality. *TAVR. 1. Diffuse opacity lower 2/3 left hemithorax obscuring the left hilum, left heart margin and left hemidiaphragm slightly increased since prior study in keeping with consolidation and pleural effusion. 2. Right lung appears clear. 3. Stable left IJ hemodialysis catheter, tip at the superior cavoatrial junction level. CT CHEST WO CONTRAST    Result Date: 9/28/2022  EXAMINATION: CT OF THE CHEST WITHOUT CONTRAST 9/28/2022 2:16 pm TECHNIQUE: CT of the chest was performed without the administration of intravenous contrast. Multiplanar reformatted images are provided for review. Automated exposure control, iterative reconstruction, and/or weight based adjustment of the mA/kV was utilized to reduce the radiation dose to as low as reasonably achievable. COMPARISON: Radiograph 09/27/2022 and CT 05/29/2022 HISTORY: ORDERING SYSTEM PROVIDED HISTORY: evaluate pericardial effusion and left pleural effusion TECHNOLOGIST PROVIDED HISTORY: Reason for exam:->evaluate pericardial effusion and left pleural effusion Reason for Exam: SOB, MID CHEST PRESSURE Relevant Medical/Surgical History: COPD, DIABETES FINDINGS: Mediastinum: Of precarinal lymph node measures 1.1 cm versus 2 cm previously. No enlarged lymph nodes are noted elsewhere. The heart is not enlarged. There is no pericardial effusion. There has been an aortic valve replacement. There are advanced coronary arterial calcifications. Lungs/pleura: There is a large left pleural effusion which is larger than before with adjacent consolidation in the left upper and lower lobes. This involves the lower lobes the greatest degree. A linear opacity in the right lower lobe likely represents atelectasis or scarring. The central airways are normally patent.   Left internal jugular venous catheter extends to the cavoatrial junction. A 3 mm left upper lobe nodule is stable. Upper Abdomen: There has been a cholecystectomy. The upper abdomen is otherwise unremarkable. Soft Tissues/Bones: No destructive bone lesion is identified. 1. Increased size of a large left pleural effusion with adjacent consolidation in the left upper and lower lobes, particularly the lower lobe. Airspace consolidation could represent atelectasis related to the effusion or pneumonia. 2. Stable 3 mm left upper lobe lung nodule. If patient is considered high risk, a 1 year follow-up chest CT is suggested. RECOMMENDATIONS: Lung nodule Fleischner Society guidelines for follow-up and management of incidentally detected pulmonary nodules: Single Solid Nodule: ~Nodule size less than 6 mm. In a low-risk patient, no routine follow-up. In a high-risk patient, optional CT at 12 months. Radiology 2017 http://pubs. rsna.org/doi/full/10.1148/radiol. 9735424247     XR CHEST PORTABLE    Result Date: 9/23/2022  EXAMINATION: ONE XRAY VIEW OF THE CHEST 9/23/2022 4:44 pm COMPARISON: Chest x-ray dated 2 August 2022 HISTORY: ORDERING SYSTEM PROVIDED HISTORY: SOB, decreased sounds on Left TECHNOLOGIST PROVIDED HISTORY: Reason for exam:->SOB, decreased sounds on Left Reason for Exam: sob FINDINGS: Mild cardiomegaly with left-sided central venous catheter with the tip in the superior vena cava. The right lung is clear. Stable appearance of the left lung with moderate left pleural effusion and left lower lobe airspace opacities. Moderate left pleural effusion with associated left lower lobe airspace opacities.              Assessment:  Principal Problem:    CHF (congestive heart failure) ([facility])  Active Problems:    CAD (coronary artery disease)    Asthma-COPD overlap syndrome (HCC)    Uncontrolled hypertension    History of transcatheter aortic valve replacement (TAVR)    Cardiomyopathy (HCC)    SOB (shortness of breath) Pericardial effusion    Obesity (BMI 30-39.9)    ZAINAB on CPAP    Pleural effusion on left    ESRD (end stage renal disease) on dialysis (HCC)    PAF (paroxysmal atrial fibrillation) ([facility] [address]  Resolved Problems:    * No resolved hospital problems.  *          Plan:   Oxygen supplementation  to keep saturation being 90-94% only  Please titrate down the oxygen as per the above parameters  Home CPAP on intermittent basis  Patient's acid-base balance is maintained and does not need any change or escalation   Pulmonary toilet  Patient does not need any antibiotics from pulmonary standpoint of view  HD as per nephrology-got 1 cycle of hemofiltration today  Patient has large pleural effusion which may be contributing to patient's symptoms  Patient's Eliquis is on hold   Was informed with the nursing that patient's Plavix was not discontinued on 24th of this month but instead it was stopped only on 27th of this month and for that reason further changes may have to be postponed with Plavix being stopped for 5 days-discussed with patient also who understands  Antihypertensives as per IM/nephrology   Patient may need dry weight to be modified-nephrology to decide upon that   Optimization of cardiac medications as per cardiology  Bronchodilators  No need for  any systemic steroids at this time  Blood glucose monitoring with sliding scale insulin  May need long acting insulin -as per IM   Trend hemodynamics and cardiac rhythm closely  Diet and life style modifications  PUD prophylaxis      Case d/w patient and nursing        Electronically signed by:  Joy Werner MD    10/1/2022    10:36 AM.

## 2022-10-01 NOTE — PROGRESS NOTES
[facility] Progress Note           Subjective/   45y.o. year old male who we are seeing in consultation for ESKD on HD. HPI:  Last HD on 9/30 with 3.1 liters removed, post-weight of 104.1 kg. Seen on dialysis. Orders confirmed. Pre-weight at HD today 106.4 kg. States shortness of breath persists, better with HD now, remains on 3 L NC.    ROS:  No chest pain, intake adequate. Objective/   GEN:  Chronically ill, /71   Pulse 65   Temp 97.8 °F (36.6 °C)   Resp 16   Ht 5' 9\" (1.753 m)   Wt 234 lb 9.1 oz (106.4 kg)   SpO2 96%   BMI 34.64 kg/m²   HEENT: non-icteric, no JVD  CV: S1, S2 without m/r/g; no LE edema  RESP: CTA B w/o w/r/r, decreased on left; breathing wnl  ABD: +bs, soft, nt, no hsm  SKIN: warm, no rashes  ACCESS: Left IJ [facility]    Data/  Recent Labs     09/29/22  0754 09/30/22  0502 10/01/22  0458   WBC 9.0 7.8 8.1   HGB 11.3* 10.5* 10.8*   HCT 35.9* 32.8* 33.3*   MCV 89.0 89.3 89.1    298 281       Recent Labs     09/29/22  0754 09/30/22  0502 10/01/22  0458   * 130* 127*   K 5.4*  5.4* 4.6  4.6 4.9   CL 91* 90* 91*   CO2 26 25 23   GLUCOSE 175* 148* 181*   PHOS 6.1* 6.6* 4.0   MG 2.00  --   --    BUN 51* 69* 47*   CREATININE 5.7* 7.4* 5.0*   LABGLOM 10* 8* 12*   GFRAA 13* 9* 15*         Assessment/     - End stage kidney disease - on HD Mon-Wed-Fri    - NSTEMI    - Hypertension    - s/p TAVR    - Left pleural effusion - increased in size from CT chest on 9/28    - COPD/ZAINAB - requiring bi-level    - Anemia of chronic disease - DAVON with HD    - Hyperkalemia - better with HD, reduction in entresto dosing      Plan/     - HD per MWF schedule - extra HD today to target .5 kg, may need thoracentesis, defer to Pulmonary  - DAVON with HD  - Trend labs, bp's    ____________________________________  Nicolle Gerardo MD  [facility]  [email or web address]  Office: [phone]

## 2022-10-01 NOTE — PROGRESS NOTES
Hospitalist Progress Note  10/1/2022 1:27 PM  Subjective:   Admit Date: 9/23/2022  PCP: Augustin Dominguez MD Status: Inpatient  Interval History: Hospital Day: 9, admitted with acute on chronic combined systolic (EF 95-36%) and diastolic heart failure with elevated troponin (chronic) and known cardiorenal syndrome. Ischemic cardiomyopathy and multivessel CAD s/p PCI to LAD in 2015, RCA 1/2022 and [facility] (June 2022) severe LCx and D1 stenosis, medical management. Aortic stenosis s/p TAVR in 2019. Large left pleural effusion with consolidation. Clopidogrel and apixaban held for thoracentesis on Monday. Initiated on aspirin 81 mg daily in the interim. Volume control through hemodialysis. Followed by cardiology and nephrology.      Diet: Regular, low fat  Left neck hemodialysis catheter  Medications:     sacubitril-valsartan  0.5 tablet Oral BID   aspirin  81 mg Oral Daily   epoetin siddharth-epbx  5,400 Units IntraVENous Q MWF   insulin lispro SSI  0-4 Units SubCUTAneous TID WC   apixaban  5 mg Oral BID [held]   clopidogrel  75 mg Oral Daily [held]   duloxetine  30 mg Oral Daily   linaclotide  145 mcg Oral QAM AC   isosorbide dinitrate  20 mg Oral TID   pantoprazole  40 mg Oral QAM AC   pravastatin  40 mg Oral Daily   tiotropium-olodaterol  2 puff Inhalation Daily   quetiapine  50 mg Oral Nightly   metoprolol succinate  100 mg Oral BID   docusate sodium  100 mg Oral Daily   b complex-C-folic acid  1 capsule Oral Daily     Recent Labs     09/29/22  0754 09/30/22  0502 10/01/22  0458   WBC 9.0 7.8 8.1   HGB 11.3* 10.5* 10.8*    298 281   MCV 89.0 89.3 89.1     Recent Labs     09/29/22  0754 09/30/22  0502 10/01/22  0458   * 130* 127*   K 5.4*  5.4* 4.6  4.6 4.9   CL 91* 90* 91*   CO2 26 25 23   BUN 51* 69* 47*   CREATININE 5.7* 7.4* 5.0*   GLUCOSE 175* 148* 181*     Magnesium (9/29) 2.00 mg/dL  proBNP (9/26) > 70,000, (9/29) 58,123 pg/mL  Hemoglobin A1c (9/24) 7.2%  Procalcitonin (9/23) 0.46 ng/mL    Troponin T (9/23) 0.14, (9/24) 0.15 ng/mL      Blood culture x 2 (9/23) no growth to date  SARS-CoV-2 NAAT (9/23) not detected    POC Glucose:   Recent Labs     09/30/22  1623 09/30/22  2020 10/01/22  0725 10/01/22  1132   POCGLU 227* 201* 165* 160*     CT Chest (9/28) Increased size of a large left pleural effusion with adjacent consolidation in the left upper and lower lobes, particularly the lower lobe. Airspace consolidation could represent atelectasis related to the effusion or pneumonia. Stable 3 mm left upper lobe lung nodule. If patient is considered high risk, a 1 year follow-up chest CT is suggested. Portable CXR (9/23) Moderate left pleural effusion with associated left lower lobe airspace opacities. Echo (9/23)  Patient refused use of Definity contrast. Limited only f/u for LVEF and s/p TAVR. The left ventricular systolic function appears severely reduced with visually estimated ejection fraction of 20-25%. Poor endocardial visualization. By history, there is a #29 mm Langford Leyda S3 bioprosthetic valve in the aortic position. The valve appears well seated with no rocking motion. Valve leaflets are not  well-evaluated. Normal Doppler values. No evidence of aortic valve regurgitation. Pericardial effusion, noted darrick-apically where measures up to 1.5 cm, anteriorly along right ventricle, with exudate. Appears likely trace along lateral wall. Difficult to discern apically/laterally however, whether pericardial or tracking pleural effusion. No tamponade physiology. There is a large left pleural effusion.     Objective:   Vitals:  /87   Pulse 56   Temp 97.8 °F (36.6 °C) (Oral)   Resp 16   Ht 5' 9\" (1.753 m)   Wt 227 lb 11.8 oz (103.3 kg)   SpO2 96%   BMI 33.63 kg/m²   General appearance: alert and cooperative with exam  Lungs: reduced overall air flow, left dullness  Heart: distant, regular rate and rhythm, no appreciable murmur  Abdomen:  soft, benign, audible bowel sounds  Extremities:  2+ edema, neurovascular status intact  Neurologic: No obvious focal neurologic deficits. Assessment and Plan:   Acute on chronic combined systolic (EF 93-37%) and diastolic heart failure with elevated troponin (chronic) and known cardiorenal syndrome. Continues on metoprolol tartrate 100 mg BID, Entresto 24-25 mg BID (half tablet due to higher potassium), and Isordil 20 mg TID. Ischemic cardiomyopathy and multivessel CAD s/p PCI to LAD in 2015, RCA 1/2022 and [facility] (June 2022) severe LCx and D1 stenosis, medical management. Aortic stenosis s/p TAVR in 2019. Large left pleural effusion with consolidation. Increased in size from CT chest on 9/28. Clopidogrel and apixaban held for thoracentesis on Monday. Initiated on aspirin 81 mg daily in the interim. ESRD on HD M-W-Fr:  Hyperkalemia and volume control through hemodialysis. Last HD on 9/30 with 3.1 liters removed, post-weight of 104.1 kg. COPD with ZAINAB requiring BiPAP at night. Stable 3 mm left upper lobe lung nodule, 1 year follow-up chest CT is suggested. Advance Directive: Full Code  DVT prophylaxis with enoxaparin 40 mg sub-Q daily. Discharge planning: Plans for thoracentesis on Monday.        Gem Pandey MD  Rounding Hospitalist

## 2022-10-01 NOTE — PLAN OF CARE
Problem: Discharge Planning  Goal: Discharge to home or other facility with appropriate resources  10/1/2022 1913 by Michael Juarez RN  Outcome: Progressing  10/1/2022 1708 by Merna Frances RN  Outcome: Progressing     Problem: Pain  Goal: Verbalizes/displays adequate comfort level or baseline comfort level  10/1/2022 1913 by Michael Juarez RN  Outcome: Progressing  10/1/2022 1708 by Merna Frances RN  Outcome: Progressing     Problem: Chronic Conditions and Co-morbidities  Goal: Patient's chronic conditions and co-morbidity symptoms are monitored and maintained or improved  10/1/2022 1913 by Michael Juarez RN  Outcome: Progressing  10/1/2022 1708 by Merna Frances RN  Outcome: Progressing     Problem: Safety - Adult  Goal: Free from fall injury  10/1/2022 1913 by Michael Juarez RN  Outcome: Progressing  10/1/2022 1708 by Merna Frances RN  Outcome: Progressing     Problem: ABCDS Injury Assessment  Goal: Absence of physical injury  10/1/2022 1913 by Michael Juarez RN  Outcome: Progressing  10/1/2022 1708 by Merna Frances RN  Outcome: Progressing     Problem: Skin/Tissue Integrity  Goal: Absence of new skin breakdown  Description: 1. Monitor for areas of redness and/or skin breakdown  2. Assess vascular access sites hourly  3. Every 4-6 hours minimum:  Change oxygen saturation probe site  4. Every 4-6 hours:  If on nasal continuous positive airway pressure, respiratory therapy assess nares and determine need for appliance change or resting period.   10/1/2022 1913 by Michael Juarez RN  Outcome: Progressing  10/1/2022 1708 by Merna Frances RN  Outcome: Progressing

## 2022-10-02 ENCOUNTER — APPOINTMENT (OUTPATIENT)
Dept: GENERAL RADIOLOGY | Age: 52
DRG: 291 | End: 2022-10-02
Payer: MEDICARE

## 2022-10-02 ENCOUNTER — APPOINTMENT (OUTPATIENT)
Dept: ULTRASOUND IMAGING | Age: 52
DRG: 291 | End: 2022-10-02
Payer: MEDICARE

## 2022-10-02 LAB
APPEARANCE FLUID: CLEAR
CELL COUNT FLUID TYPE: NORMAL
CLOT EVALUATION: NORMAL
COLOR FLUID: YELLOW
EOSINOPHIL FLUID: 8 %
GLUCOSE BLD-MCNC: 165 MG/DL (ref 70–99)
GLUCOSE BLD-MCNC: 171 MG/DL (ref 70–99)
GLUCOSE BLD-MCNC: 172 MG/DL (ref 70–99)
GLUCOSE BLD-MCNC: 200 MG/DL (ref 70–99)
LYMPHOCYTES, BODY FLUID: 70 %
MONOCYTE, FLUID: 22 %
NUCLEATED CELLS FLUID: 96 /CUMM
NUMBER OF CELLS COUNTED FLUID: 100
PERFORMED ON: ABNORMAL
PRO-BNP: ABNORMAL PG/ML (ref 0–124)
RBC FLUID: 300 /CUMM

## 2022-10-02 PROCEDURE — 0W9B3ZZ DRAINAGE OF LEFT PLEURAL CAVITY, PERCUTANEOUS APPROACH: ICD-10-PCS | Performed by: INTERNAL MEDICINE

## 2022-10-02 PROCEDURE — 7100000010 HC PHASE II RECOVERY - FIRST 15 MIN: Performed by: INTERNAL MEDICINE

## 2022-10-02 PROCEDURE — 88112 CYTOPATH CELL ENHANCE TECH: CPT

## 2022-10-02 PROCEDURE — 87015 SPECIMEN INFECT AGNT CONCNTJ: CPT

## 2022-10-02 PROCEDURE — 99232 SBSQ HOSP IP/OBS MODERATE 35: CPT | Performed by: INTERNAL MEDICINE

## 2022-10-02 PROCEDURE — 2060000000 HC ICU INTERMEDIATE R&B

## 2022-10-02 PROCEDURE — 94660 CPAP INITIATION&MGMT: CPT

## 2022-10-02 PROCEDURE — 84157 ASSAY OF PROTEIN OTHER: CPT

## 2022-10-02 PROCEDURE — 87206 SMEAR FLUORESCENT/ACID STAI: CPT

## 2022-10-02 PROCEDURE — 82042 OTHER SOURCE ALBUMIN QUAN EA: CPT

## 2022-10-02 PROCEDURE — 89051 BODY FLUID CELL COUNT: CPT

## 2022-10-02 PROCEDURE — 6370000000 HC RX 637 (ALT 250 FOR IP): Performed by: INTERNAL MEDICINE

## 2022-10-02 PROCEDURE — 87116 MYCOBACTERIA CULTURE: CPT

## 2022-10-02 PROCEDURE — 82945 GLUCOSE OTHER FLUID: CPT

## 2022-10-02 PROCEDURE — 94640 AIRWAY INHALATION TREATMENT: CPT

## 2022-10-02 PROCEDURE — 36415 COLL VENOUS BLD VENIPUNCTURE: CPT

## 2022-10-02 PROCEDURE — 88305 TISSUE EXAM BY PATHOLOGIST: CPT

## 2022-10-02 PROCEDURE — 94761 N-INVAS EAR/PLS OXIMETRY MLT: CPT

## 2022-10-02 PROCEDURE — 76604 US EXAM CHEST: CPT

## 2022-10-02 PROCEDURE — 71045 X-RAY EXAM CHEST 1 VIEW: CPT

## 2022-10-02 PROCEDURE — 2709999900 HC NON-CHARGEABLE SUPPLY: Performed by: INTERNAL MEDICINE

## 2022-10-02 PROCEDURE — 3609027000 HC THORACENTESIS W/ULTRASOUND: Performed by: INTERNAL MEDICINE

## 2022-10-02 PROCEDURE — 83615 LACTATE (LD) (LDH) ENZYME: CPT

## 2022-10-02 PROCEDURE — 6370000000 HC RX 637 (ALT 250 FOR IP): Performed by: NURSE PRACTITIONER

## 2022-10-02 PROCEDURE — 32555 ASPIRATE PLEURA W/ IMAGING: CPT | Performed by: INTERNAL MEDICINE

## 2022-10-02 PROCEDURE — 2700000000 HC OXYGEN THERAPY PER DAY

## 2022-10-02 PROCEDURE — 83880 ASSAY OF NATRIURETIC PEPTIDE: CPT

## 2022-10-02 RX ORDER — CARVEDILOL 12.5 MG/1
TABLET ORAL
Qty: 60 TABLET | Refills: 10 | OUTPATIENT
Start: 2022-10-02

## 2022-10-02 RX ADMIN — OXYCODONE AND ACETAMINOPHEN 1 TABLET: 7.5; 325 TABLET ORAL at 05:13

## 2022-10-02 RX ADMIN — APIXABAN 5 MG: 5 TABLET, FILM COATED ORAL at 20:42

## 2022-10-02 RX ADMIN — NEPHROCAP 1 MG: 1 CAP ORAL at 09:12

## 2022-10-02 RX ADMIN — TIOTROPIUM BROMIDE AND OLODATEROL 2 PUFF: 3.124; 2.736 SPRAY, METERED RESPIRATORY (INHALATION) at 09:05

## 2022-10-02 RX ADMIN — QUETIAPINE FUMARATE 50 MG: 25 TABLET ORAL at 21:57

## 2022-10-02 RX ADMIN — METOPROLOL SUCCINATE 100 MG: 50 TABLET, EXTENDED RELEASE ORAL at 20:42

## 2022-10-02 RX ADMIN — ISOSORBIDE DINITRATE 20 MG: 20 TABLET ORAL at 09:12

## 2022-10-02 RX ADMIN — PRAVASTATIN SODIUM 40 MG: 40 TABLET ORAL at 09:13

## 2022-10-02 RX ADMIN — OXYCODONE AND ACETAMINOPHEN 1 TABLET: 7.5; 325 TABLET ORAL at 15:50

## 2022-10-02 RX ADMIN — SACUBITRIL AND VALSARTAN 0.5 TABLET: 24; 26 TABLET, FILM COATED ORAL at 20:42

## 2022-10-02 RX ADMIN — DULOXETINE HYDROCHLORIDE 30 MG: 30 CAPSULE, DELAYED RELEASE ORAL at 09:12

## 2022-10-02 RX ADMIN — ISOSORBIDE DINITRATE 20 MG: 20 TABLET ORAL at 21:57

## 2022-10-02 RX ADMIN — ASPIRIN 81 MG 81 MG: 81 TABLET ORAL at 09:12

## 2022-10-02 RX ADMIN — SACUBITRIL AND VALSARTAN 0.5 TABLET: 24; 26 TABLET, FILM COATED ORAL at 09:12

## 2022-10-02 RX ADMIN — OXYCODONE AND ACETAMINOPHEN 1 TABLET: 7.5; 325 TABLET ORAL at 21:57

## 2022-10-02 RX ADMIN — METOPROLOL SUCCINATE 100 MG: 50 TABLET, EXTENDED RELEASE ORAL at 09:13

## 2022-10-02 RX ADMIN — PANTOPRAZOLE SODIUM 40 MG: 40 TABLET, DELAYED RELEASE ORAL at 05:13

## 2022-10-02 RX ADMIN — ISOSORBIDE DINITRATE 20 MG: 20 TABLET ORAL at 15:47

## 2022-10-02 ASSESSMENT — PAIN DESCRIPTION - ORIENTATION
ORIENTATION: RIGHT;LEFT;LOWER
ORIENTATION: LOWER;MID
ORIENTATION: LEFT;RIGHT;LOWER

## 2022-10-02 ASSESSMENT — PAIN DESCRIPTION - LOCATION
LOCATION: BACK
LOCATION: BACK;HIP
LOCATION: BACK
LOCATION: BACK;HIP
LOCATION: BACK

## 2022-10-02 ASSESSMENT — PAIN - FUNCTIONAL ASSESSMENT: PAIN_FUNCTIONAL_ASSESSMENT: NONE - DENIES PAIN

## 2022-10-02 ASSESSMENT — PAIN SCALES - GENERAL
PAINLEVEL_OUTOF10: 9
PAINLEVEL_OUTOF10: 9
PAINLEVEL_OUTOF10: 10
PAINLEVEL_OUTOF10: 9
PAINLEVEL_OUTOF10: 8

## 2022-10-02 ASSESSMENT — PAIN DESCRIPTION - DESCRIPTORS
DESCRIPTORS: ACHING
DESCRIPTORS: ACHING;DISCOMFORT

## 2022-10-02 NOTE — PROGRESS NOTES
10/01/22 2017   NIV Type   Skin Assessment Clean, dry, & intact   Skin Protection for O2 Device No  (pt declines mepilex)   NIV Started/Stopped On   Equipment Type v60   Mode Bilevel   Mask Type Full face mask   Mask Size Large   Settings/Measurements   PIP Observed 19 cm H20   IPAP 18 cmH20   CPAP/EPAP 6 cmH2O   Vt (Measured) 679 mL   Rate Ordered 14   Resp 19   Insp Rise Time (%) 1 %   FiO2  30 %   I Time/ I Time % 1 s   Minute Volume (L/min) 17.9 Liters   Mask Leak (lpm) 30 lpm   Comfort Level Good   Using Accessory Muscles No   SpO2 96   Breath Sounds   Right Upper Lobe Diminished   Right Middle Lobe Diminished   Right Lower Lobe Diminished   Left Upper Lobe Diminished   Left Lower Lobe Diminished   Alarm Settings   Alarms On Y   Low Pressure (cmH2O) 6 cmH2O   High Pressure (cmH2O) 30 cmH2O   Apnea (secs) 20 secs   RR Low (bpm) 6   RR High (bpm) 40 br/min

## 2022-10-02 NOTE — PLAN OF CARE
Awaiting plavix washout for thoracentesis. Continue aspirin     Once procedures completed; recommend restarting plavix and eliquis and then stop aspirin. Will follow peripherally  Call with questions.    Michele Garcia CNP, 10/2/2022, 12:56 PM

## 2022-10-02 NOTE — PROGRESS NOTES
[facility] Progress Note           Subjective/   45y.o. year old male who we are seeing in consultation for ESKD on HD. HPI:  Last had extra HD on 10/1 with 3.1 liters removed, post-weight of 103.3 kg. States shortness of breath persists, better with HD now, remains on 3 L NC.    ROS:  No chest pain, intake adequate. Objective/   GEN:  Chronically ill, /85   Pulse 51   Temp 97.5 °F (36.4 °C) (Oral)   Resp 16   Ht 5' 9\" (1.753 m)   Wt 225 lb 8.5 oz (102.3 kg)   SpO2 97%   BMI 33.31 kg/m²   HEENT: non-icteric, no JVD  CV: S1, S2 without m/r/g; no LE edema  RESP: CTA B w/o w/r/r, decreased on left; breathing wnl  ABD: +bs, soft, nt, no hsm  SKIN: warm, no rashes  ACCESS: Left [facility], [place], operated by [facility]    Data/  Recent Labs     09/30/22  0502 10/01/22  0458   WBC 7.8 8.1   HGB 10.5* 10.8*   HCT 32.8* 33.3*   MCV 89.3 89.1    281       Recent Labs     09/30/22  0502 10/01/22  0458   * 127*   K 4.6  4.6 4.9   CL 90* 91*   CO2 25 23   GLUCOSE 148* 181*   PHOS 6.6* 4.0   BUN 69* 47*   CREATININE 7.4* 5.0*   LABGLOM 8* 12*   GFRAA 9* 15*         Assessment/     - End stage kidney disease - on HD Mon-Wed-Fri    - NSTEMI    - Hypertension    - s/p TAVR    - Left pleural effusion - increased in size from CT chest on 9/28    - COPD/ZAINAB - requiring bi-level    - Anemia of chronic disease - DAVON with HD    - Hyperkalemia - better with HD, reduction in entresto dosing      Plan/     - HD per [facility] schedule - challenge target .5 kg as tolerated, may need thoracentesis once off plavix for 5 days, defer to Pulmonary  - DAVON with HD  - Trend labs, bp's    ____________________________________  Karlie Vaughn MD  [facility]  [email or web address]  Office: [phone]

## 2022-10-02 NOTE — PROGRESS NOTES
Patient's EF (Ejection Fraction) is less than 40%    Heart Failure Medications:  Diuretics[de-identified] None    (One of the following REQUIRED for EF </= 40%/SYSTOLIC FAILURE but MAY be used in EF% >40%/DIASTOLIC FAILURE)        ACE[de-identified] None        ARB[de-identified] None         ARNI[de-identified] Sacubitril/Valsartan-Entresto    (Beta Blockers)  NON- Evidenced Based Beta Blocker (for EF% >40%/DIASTOLIC FAILURE): None    Evidenced Based Beta Blocker::(REQUIRED for EF% <40%/SYSTOLIC FAILURE) Metoprolol SUCCinate- Toprol XL  . .................................................................................................................................................. Patient's weights and intake/output reviewed: Yes    Patient's Last Weight: 225 lbs obtained by standing scale. Difference of 9 lbs less than last documented weight.       Intake/Output Summary (Last 24 hours) at 10/2/2022 1808  Last data filed at 10/1/2022 2017  Gross per 24 hour   Intake 560 ml   Output --   Net 560 ml       Education Booklet Provided: yes    Comorbidities Reviewed Yes    Patient has a past medical history of Ambulatory dysfunction, Aortic stenosis, Arthritis, Asthma, Bilateral hilar adenopathy syndrome, CAD (coronary artery disease), Cardiomyopathy ([address] CHF (congestive heart failure) ([address] Chronic pain, COPD (chronic obstructive pulmonary disease) ([address] Depression, Diabetes mellitus ([address] Difficult intravenous access, Emphysema of lung ([address] ESRD (end stage renal disease) on dialysis ([address] Fear of needles, Gastric ulcer, GERD (gastroesophageal reflux disease), Heart valve problem, Hemodialysis patient ([address] History of spinal fracture, Hx of blood clots, Hyperlipidemia, Hypertension, MI (myocardial infarction) ([address] Neuromuscular disorder ([address] Numbness and tingling in left arm, Pneumonia, PONV (postoperative nausea and vomiting), Prolonged emergence from general anesthesia, Sleep apnea, Stroke ([address] TIA (transient ischemic attack), and Unspecified diseases of blood and blood-forming organs. >>For CHF and Comorbidity documentation on Education Time and Topics, please see Education Tab    Progressive Mobility Assessment:  What is this patient's Current Level of Mobility?: Ambulatory-Up [place]  How was this patient Mobilized today?: Edge of Bed and Up in Room, ambulated 20 ft                 With Whom? Nurse                 Level of Difficulty/Assistance: Independent     Pt resting in bed at this time on  3 L O2. Pt denies shortness of breath. Pt without lower extremity edema.      Patient and/or Family's stated Goal of Care this Admission: increase activity tolerance and better understand heart failure and disease management prior to discharge        :

## 2022-10-02 NOTE — PROGRESS NOTES
Left sided Thoracentesis completed, pt tolerated well, post procedure CXR completed and pt returned to his room. Intact

## 2022-10-02 NOTE — PROGRESS NOTES
10/02/22 0401   NIV Type   Equipment Type V60   Mode Bilevel   Mask Type Full face mask   Mask Size Large   Settings/Measurements   PIP Observed 20 cm H20   IPAP 18 cmH20   CPAP/EPAP 6 cmH2O   Vt (Measured) 620 mL   Resp 18   FiO2  30 %   Minute Volume (L/min) 11 Liters   Mask Leak (lpm) 28 lpm   Comfort Level Good   Using Accessory Muscles No   Patient's Home Machine No

## 2022-10-02 NOTE — PROCEDURES
Thoracentesis Note     Patient with shortness of breath which is recommended persistent along with that patient has history of renal failure on dialysis along with that patient also has a history of atrial fibrillation and coronary disease and was on Plavix and Eliquis which has been put on hold, patient continue with symptomatic and not improving with dialysis self and after discussion it was decided to do a thoracentesis under ultrasound guidance and patient was told about the procedure along with the pros and cons and patient was agreeable and for that reason after informed consent, the patient was taken to the [facility], after timeout, the pocket of fluid was localized with the help of ultrasound, and under aseptic conditions under local anesthesia, 1850 mL of straw-colored fluid was removed without any apparent complications, patient saturation after the procedure was 100%, x-ray chest has been ordered  Pleural fluid being sent for various chemistries cell count cytology  No apparent immediate complications  Estimated blood loss was 0    Further management depending on patient clinical status and the pleural fluid analysis  Patient's Plavix and Eliquis can be restarted from pulm standpoint of view which was discussed with nursing    Charlie Esquivel MD

## 2022-10-02 NOTE — PROGRESS NOTES
10/02/22 0000   NIV Type   $NIV $Daily Charge   Equipment Type V60   Mode Bilevel   Mask Type Full face mask   Mask Size Large   Settings/Measurements   PIP Observed 18 cm H20   IPAP 18 cmH20   CPAP/EPAP 6 cmH2O   Vt (Measured) 550 mL   Resp 16  (Simultaneous filing.  User may not have seen previous data.)   FiO2  30 %   Minute Volume (L/min) 9 Liters   Mask Leak (lpm) 34 lpm   Comfort Level Good   Using Accessory Muscles No   Patient's Home Machine No   Oxygen Therapy/Pulse Ox   Heart Rate 55   SpO2 99 %

## 2022-10-02 NOTE — PROGRESS NOTES
Hospitalist Progress Note  10/2/2022 9:48 AM  Subjective:   Admit Date: 9/23/2022  PCP: Caio Martínez MD Status: Inpatient  Interval History: Hospital Day: 10, lying on left side with less dyspnea. Thoracentesis pending for Monday. History of present illness:  Admitted with acute on chronic combined systolic (EF 30-86%) and diastolic heart failure with elevated troponin (chronic) and known cardiorenal syndrome. Ischemic cardiomyopathy and multivessel CAD s/p PCI to LAD in 2015, RCA 1/2022 and [facility] (June 2022) severe LCx and D1 stenosis, medical management. Aortic stenosis s/p TAVR in 2019. Large left pleural effusion with consolidation. Clopidogrel and apixaban held for thoracentesis on Monday. Initiated on aspirin 81 mg daily in the interim. Volume control through hemodialysis. Followed by cardiology and nephrology.       Diet: Regular, low fat (tolerating)  Left neck hemodialysis catheter  Medications:     sacubitril-valsartan  0.5 tablet Oral BID   aspirin  81 mg Oral Daily   epoetin siddharth-epbx  5,400 Units IntraVENous Q MWF   insulin lispro  0-4 Units SubCUTAneous TID WC   insulin lispro  0-4 Units SubCUTAneous Nightly   apixaban  5 mg Oral BID [held]   clopidogrel  75 mg Oral Daily [held]   duloxetine  30 mg Oral Daily   linaclotide  145 mcg Oral QAM AC   isosorbide dinitrate  20 mg Oral TID   pantoprazole  40 mg Oral QAM AC   pravastatin  40 mg Oral Daily   tiotropium-olodaterol  2 puff Inhalation Daily   quetiapine  50 mg Oral Nightly   metoprolol succinate  100 mg Oral BID   docusate sodium  100 mg Oral Daily   b complex-C-folic acid  1 capsule Oral Daily     Recent Labs     09/30/22  0502 10/01/22  0458   WBC 7.8 8.1   HGB 10.5* 10.8*    281   MCV 89.3 89.1     Recent Labs     09/30/22  0502 10/01/22  0458   * 127*   K 4.6  4.6 4.9   CL 90* 91*   CO2 25 23   BUN 69* 47*   CREATININE 7.4* 5.0*   GLUCOSE 148* 181*     Magnesium (9/29) 2.00 mg/dL  proBNP (9/26) > 70,000, (9/29) 58,123, (10/2) 36,381 pg/mL  Hemoglobin A1c (9/24) 7.2%  Procalcitonin (9/23) 0.46 ng/mL     Troponin T (9/23) 0.14, (9/24) 0.15 ng/mL     Blood culture x 2 (9/23) no growth to date  SARS-CoV-2 NAAT (9/23) not detected    POC Glucose:   Recent Labs     10/01/22  1132 10/01/22  1620 10/01/22  2010 10/02/22  0813   POCGLU 160* 184* 188* 165*     CT Chest (9/28) Increased size of a large left pleural effusion with adjacent consolidation in the left upper and lower lobes, particularly the lower lobe. Airspace consolidation could represent atelectasis related to the effusion or pneumonia. Stable 3 mm left upper lobe lung nodule. If patient is considered high risk, a 1 year follow-up chest CT is suggested. Portable CXR (9/23) Moderate left pleural effusion with associated left lower lobe airspace opacities. Echo (9/23)  Patient refused use of Definity contrast. Limited only f/u for LVEF and s/p TAVR. The left ventricular systolic function appears severely reduced with visually estimated ejection fraction of 20-25%. Poor endocardial visualization. By history, there is a #29 mm Langford Leyda S3 bioprosthetic valve in the aortic position. The valve appears well seated with no rocking motion. Valve leaflets are not  well-evaluated. Normal Doppler values. No evidence of aortic valve regurgitation. Pericardial effusion, noted darrick-apically where measures up to 1.5 cm, anteriorly along right ventricle, with exudate. Appears likely trace along lateral wall. Difficult to discern apically/laterally however, whether pericardial or tracking pleural effusion. No tamponade physiology. There is a large left pleural effusion.     Objective:   Vitals:  BP (!) 148/84   Pulse 56   Temp 97.6 °F (36.4 °C) (Oral)   Resp 16   Ht 5' 9\" (1.753 m)   Wt 225 lb 8.5 oz (102.3 kg)   SpO2 100%   BMI 33.31 kg/m²   General appearance: alert and cooperative with exam  Lungs: reduced overall air flow, left dullness  Heart: distant, regular rate and rhythm, no appreciable murmur  Abdomen:  soft, benign, audible bowel sounds  Extremities:  2+ edema, neurovascular status intact  Neurologic: No obvious focal neurologic deficits. Assessment and Plan:   Acute on chronic combined systolic (EF 15-76%) and diastolic heart failure with elevated troponin (chronic) and known cardiorenal syndrome. Continues on metoprolol tartrate 100 mg BID, Entresto 24-25 mg BID (half tablet due to higher potassium), and Isordil 20 mg TID. Ischemic cardiomyopathy and multivessel CAD s/p PCI to LAD in 2015, RCA 1/2022 and [facility] (June 2022) severe LCx and D1 stenosis, medical management. Aortic stenosis s/p TAVR in 2019. Large left pleural effusion with consolidation. Increased in size from CT chest on 9/28. Clopidogrel and apixaban held for thoracentesis on Monday. Initiated on aspirin 81 mg daily in the interim. ESRD on HD M-W-Fr:  Hyperkalemia and volume control through hemodialysis. Last HD on 9/30 with 3.1 liters removed, post-weight of 104.1 kg. COPD with ZAINAB requiring BiPAP at night. Has used BiPAP at home for the past year. Stable 3 mm left upper lobe lung nodule, 1 year follow-up chest CT is suggested. Advance Directive: Full Code  DVT prophylaxis with enoxaparin 40 mg sub-Q daily. Discharge planning: Plans for thoracentesis on Monday.        Gem Pandey MD  Rounding Hospitalist

## 2022-10-02 NOTE — PROGRESS NOTES
INPATIENT PULMONARY CRITICAL CARE PROGRESS NOTE      Reason for visit    Left pleural effusion     SUBJECTIVE: Patient continues to have  some cough with mucoid expectoration, patient continues to have shortness of breath, patient does not have any chest pain or palpitations, patient was afebrile and hemodynamically  maintained, patient has NSR to SB on the monitor patient was on 3 L of nasal cannula oxygen with saturation of 98%, patient blood sugars are slightly on the high side, patient's cumulative fluid balance is -4.9 L, patient was alert and oriented, no other pertinent review of system of concern      Physical Exam:  Blood pressure (!) 148/84, pulse 56, temperature 97.6 °F (36.4 °C), temperature source Oral, resp. rate 16, height 5' 9\" (1.753 m), weight 225 lb 8.5 oz (102.3 kg), SpO2 100 %.'     Constitutional: No respiratory distress   HENT: No facial asymmetry no thyromegaly. Eyes:  Conjunctivae are normal. Pupils equal, round, and reactive to light. No scleral icterus. Neck: . No tracheal deviation present. No obvious thyroid mass. Short large neck  Cardiovascular: Normal rate, regular rhythm, normal heart sounds. No right ventricular heave. some lower extremity edema. Pulmonary/Chest: No wheezes. minimal  bilateral rales. Chest wall is not dull to percussion. No accessory muscle usage or stridor. Decreased breath sound intensity(L>R)  Abdominal: Soft. Bowel sounds present. No distension or hernia. No tenderness. Obese  Musculoskeletal: No cyanosis. No clubbing. No obvious joint deformity. Lymphadenopathy: No cervical or supraclavicular adenopathy. Skin: Skin is warm and dry. No rash or nodules on the exposed extremities.   Psychiatric: Normal reaction ;no anxiety   Neurologic Alert and communicative with no focal cranial deficits    Results:  CBC:   Recent Labs     09/30/22  0502 10/01/22  0458   WBC 7.8 8.1   HGB 10.5* 10.8*   HCT 32.8* 33.3*   MCV 89.3 89.1    281       BMP: Recent Labs     09/30/22  0502 10/01/22  0458   * 127*   K 4.6  4.6 4.9   CL 90* 91*   CO2 25 23   PHOS 6.6* 4.0   BUN 69* 47*   CREATININE 7.4* 5.0*           Imaging:  I have reviewed radiology images personally. CT CHEST WO CONTRAST   Final Result   1. Increased size of a large left pleural effusion with adjacent   consolidation in the left upper and lower lobes, particularly the lower lobe. Airspace consolidation could represent atelectasis related to the effusion or   pneumonia. 2. Stable 3 mm left upper lobe lung nodule. If patient is considered high   risk, a 1 year follow-up chest CT is suggested. RECOMMENDATIONS:   Lung nodule Fleischner Society guidelines for follow-up and management of   incidentally detected pulmonary nodules:      Single Solid Nodule:      ~Nodule size less than 6 mm. In a low-risk patient, no routine follow-up. In a high-risk patient, optional CT at 12 months. Radiology 2017 http://pubs. rsna.org/doi/full/10.1148/radiol. 3936544991         XR CHEST (2 VW)   Final Result   1. Diffuse opacity lower 2/3 left hemithorax obscuring the left hilum, left   heart margin and left hemidiaphragm slightly increased since prior study in   keeping with consolidation and pleural effusion. 2. Right lung appears clear. 3. Stable left IJ hemodialysis catheter, tip at the superior cavoatrial   junction level. XR CHEST PORTABLE   Final Result   Moderate left pleural effusion with associated left lower lobe airspace   opacities.            Echo Limited    Result Date: 9/28/2022  Transthoracic Echocardiography Report (TTE)  Demographics   Patient Name       Dane Jacinto   Date of Study      09/28/2022         Gender               Male   Patient Number     [id number]         Date of Birth        1970   Visit Number       [id number]          Age                  45 year(s)   Accession Number   [id number]         Room Number          6915   Corporate ID [id number]            Sonographer          Lulu Benavidez,    Ordering Physician Kinga Duke   Interpreting         [facility]             Physician            Dane Marsh MD  Procedure Type of Study   TTE procedure:ECHOCARDIOGRAM LIMITED. Procedure Date Date: 09/28/2022 Start: 07:01 AM Study Location: [address] [place] [address] - Echo Lab Technical Quality: Adequate visualization Indications:Dyspnea/SOB. Patient Status: Routine Height: 69 inches Weight: 235.01 pounds BSA: 2.21 m2 BMI: 34.7 kg/m2 BP: 101/60 mmHg  Conclusions   Summary  Patient refused use of Definity contrast.  Limited only f/u for LVEF and s/p TAVR. The left ventricular systolic function appears severely reduced with  visually estimated ejection fraction of 20-25%. Poor endocardial visualization. By history, there is a #29 mm Langford Leyda S3 bioprosthetic valve in the  aortic position. The valve appears well seated with no rocking motion. Valve  leaflets are not well-evaluated. Normal Doppler values. No evidence of aortic valve regurgitation. Pericardial effusion, noted darrick-apically where measures up to 1.5 cm,  anteriorly along right ventricle, with exudate. Appears likely trace along  lateral wall. Difficult to discern apically/laterally however, whether  pericardial or tracking pleural effusion. No tamponade physiology. There is a large left pleural effusion. Non-contrasted CT is suggested for further characterization of pericardial  versus pleural effusion. Signature   ------------------------------------------------------------------  Electronically signed by Dane Marsh MD (Interpreting  physician) on 09/28/2022 at 11:29 AM  ------------------------------------------------------------------   Findings   Left Ventricle  The left ventricular systolic function appears severely reduced with  visually estimated ejection fraction of 20-25%. Poor endocardial visualization.    Aortic Valve  By history, there is a #29 mm Langford Leyda S3 bioprosthetic valve in the  aortic position. The valve appears well seated with no rocking motion. Valve  leaflets are not well-evaluated. Normal Doppler values. There is a maximum gradient of 18 mmHg, a mean gradient of 8 mmHg, and the  valve area is calculated at 2.13 cm2 by the continuity equation. No evidence of aortic valve regurgitation. Pericardial Effusion  Pericardial effusion, noted darrick-apically where measures up to 1.5 cm,  anteriorly along right ventricle, with exudate. Appears likely trace along  lateral wall. Difficult to discern apically/laterally however, whether  pericardial or tracking pleural effusion. No IVC plethora, chamber collapse,  or respiration flow variation. Pleural Effusion  There is a large left pleural effusion. Miscellaneous  IVC size is normal (<2.1cm) and collapses > 50% with respiration consistent  with normal RA pressure (3mmHg). M-Mode/2D Measurements (cm)                AO Root Dimension: 2.2 cm               AV Cusp Separation: 1.5 cm  LVOT: 2.6 cm  Doppler Measurements   AV Peak Velocity: 211 cm/s  AV Peak Gradient: 17.81 mmHg  AV Mean Gradient: 8 mmHg  LVOT Peak Velocity: 83.6 cm/s  AV Area (Continuity):2.26 cm2   Aortic Valve   Peak Velocity: 211 cm/s     Mean Velocity: 129 cm/s  Peak Gradient: 17.81 mmHg   Mean Gradient: 8 mmHg  Area (continuity): 2.26 cm2  AV VTI: 42.3 cm   Cusp Separation: 1.5 cm  Aorta   Aortic Root: 2.2 cm  LVOT Diameter: 2.6 cm      XR CHEST (2 VW)    Result Date: 9/27/2022  EXAMINATION: TWO XRAY VIEWS OF THE CHEST 9/27/2022 3:17 pm COMPARISON: Chest 09/23/2022 HISTORY: ORDERING SYSTEM PROVIDED HISTORY: evaluate left pleural effusion Reason for Exam: f/u FINDINGS: *4 images are presented. *Diffuse opacity lower 2/3 left hemithorax obscuring the left hilum, left heart margin and left hemidiaphragm slightly increased since prior study in keeping with consolidation and pleural effusion.  *Right lung appears clear. *Stable left IJ hemodialysis catheter, tip at the superior cavoatrial junction level. *No acute osseous abnormality. *TAVR. 1. Diffuse opacity lower 2/3 left hemithorax obscuring the left hilum, left heart margin and left hemidiaphragm slightly increased since prior study in keeping with consolidation and pleural effusion. 2. Right lung appears clear. 3. Stable left IJ hemodialysis catheter, tip at the superior cavoatrial junction level. CT CHEST WO CONTRAST    Result Date: 9/28/2022  EXAMINATION: CT OF THE CHEST WITHOUT CONTRAST 9/28/2022 2:16 pm TECHNIQUE: CT of the chest was performed without the administration of intravenous contrast. Multiplanar reformatted images are provided for review. Automated exposure control, iterative reconstruction, and/or weight based adjustment of the mA/kV was utilized to reduce the radiation dose to as low as reasonably achievable. COMPARISON: Radiograph 09/27/2022 and CT 05/29/2022 HISTORY: ORDERING SYSTEM PROVIDED HISTORY: evaluate pericardial effusion and left pleural effusion TECHNOLOGIST PROVIDED HISTORY: Reason for exam:->evaluate pericardial effusion and left pleural effusion Reason for Exam: SOB, MID CHEST PRESSURE Relevant Medical/Surgical History: COPD, DIABETES FINDINGS: Mediastinum: Of precarinal lymph node measures 1.1 cm versus 2 cm previously. No enlarged lymph nodes are noted elsewhere. The heart is not enlarged. There is no pericardial effusion. There has been an aortic valve replacement. There are advanced coronary arterial calcifications. Lungs/pleura: There is a large left pleural effusion which is larger than before with adjacent consolidation in the left upper and lower lobes. This involves the lower lobes the greatest degree. A linear opacity in the right lower lobe likely represents atelectasis or scarring. The central airways are normally patent. Left internal jugular venous catheter extends to the cavoatrial junction.   A 3 mm left upper lobe nodule is stable. Upper Abdomen: There has been a cholecystectomy. The upper abdomen is otherwise unremarkable. Soft Tissues/Bones: No destructive bone lesion is identified. 1. Increased size of a large left pleural effusion with adjacent consolidation in the left upper and lower lobes, particularly the lower lobe. Airspace consolidation could represent atelectasis related to the effusion or pneumonia. 2. Stable 3 mm left upper lobe lung nodule. If patient is considered high risk, a 1 year follow-up chest CT is suggested. RECOMMENDATIONS: Lung nodule Fleischner Society guidelines for follow-up and management of incidentally detected pulmonary nodules: Single Solid Nodule: ~Nodule size less than 6 mm. In a low-risk patient, no routine follow-up. In a high-risk patient, optional CT at 12 months. Radiology 2017 http://pubs. rsna.org/doi/full/10.1148/radiol. 8298215346     XR CHEST PORTABLE    Result Date: 9/23/2022  EXAMINATION: ONE XRAY VIEW OF THE CHEST 9/23/2022 4:44 pm COMPARISON: Chest x-ray dated 2 August 2022 HISTORY: ORDERING SYSTEM PROVIDED HISTORY: SOB, decreased sounds on Left TECHNOLOGIST PROVIDED HISTORY: Reason for exam:->SOB, decreased sounds on Left Reason for Exam: sob FINDINGS: Mild cardiomegaly with left-sided central venous catheter with the tip in the superior vena cava. The right lung is clear. Stable appearance of the left lung with moderate left pleural effusion and left lower lobe airspace opacities. Moderate left pleural effusion with associated left lower lobe airspace opacities.              Assessment:  Principal Problem:    CHF (congestive heart failure) (HCC)  Active Problems:    CAD (coronary artery disease)    Asthma-COPD overlap syndrome (HCC)    Uncontrolled hypertension    History of transcatheter aortic valve replacement (TAVR)    Cardiomyopathy (HCC)    SOB (shortness of breath)    Pericardial effusion    Obesity (BMI 30-39.9)    ZAINAB on CPAP    Pleural effusion on left    ESRD (end stage renal disease) on dialysis ([address]    PAF (paroxysmal atrial fibrillation) ([address]  Resolved Problems:    * No resolved hospital problems.  *          Plan:   Oxygen supplementation  to keep saturation being 90-94% only  Please titrate down the oxygen as per the above parameters  Home CPAP on intermittent basis  Patient's acid-base balance is maintained and does not need any change or escalation   Pulmonary toilet  Patient does not need any antibiotics from pulmonary standpoint of view  HD as per nephrology-got 1 cycle of hemofiltration today  Patient has large pleural effusion which may be contributing to patient's symptoms  Patient's Eliquis and plavix is on hold   Ultrasound guided thoracentesis today and patient's cardiac medications can be restarted after that   Antihypertensives as per IM/nephrology   Patient may need dry weight to be modified-nephrology to decide upon that   Optimization of cardiac medications as per cardiology  Bronchodilators  No need for  any systemic steroids at this time  Blood glucose monitoring with sliding scale insulin  May need long acting insulin -as per IM   Trend hemodynamics and cardiac rhythm closely  Diet and life style modifications  PUD prophylaxis      Case d/w patient and nursing        Electronically signed by:  Michael Knight MD    10/2/2022    10:36 AM.

## 2022-10-03 LAB
ALBUMIN FLUID: 2.7 G/DL
ALBUMIN SERPL-MCNC: 3.9 G/DL (ref 3.4–5)
ANION GAP SERPL CALCULATED.3IONS-SCNC: 15 MMOL/L (ref 3–16)
BUN BLDV-MCNC: 61 MG/DL (ref 7–20)
CALCIUM SERPL-MCNC: 8.8 MG/DL (ref 8.3–10.6)
CHLORIDE BLD-SCNC: 85 MMOL/L (ref 99–110)
CO2: 24 MMOL/L (ref 21–32)
CREAT SERPL-MCNC: 6.2 MG/DL (ref 0.9–1.3)
FLUID TYPE: NORMAL
GFR AFRICAN AMERICAN: 11
GFR NON-AFRICAN AMERICAN: 9
GLUCOSE BLD-MCNC: 125 MG/DL (ref 70–99)
GLUCOSE BLD-MCNC: 131 MG/DL (ref 70–99)
GLUCOSE BLD-MCNC: 167 MG/DL (ref 70–99)
GLUCOSE BLD-MCNC: 171 MG/DL (ref 70–99)
GLUCOSE, FLUID: 177 MG/DL
HCT VFR BLD CALC: 33.9 % (ref 40.5–52.5)
HEMOGLOBIN: 10.8 G/DL (ref 13.5–17.5)
LD, FLUID: 136 U/L
MCH RBC QN AUTO: 28.2 PG (ref 26–34)
MCHC RBC AUTO-ENTMCNC: 32 G/DL (ref 31–36)
MCV RBC AUTO: 88.3 FL (ref 80–100)
PDW BLD-RTO: 16.5 % (ref 12.4–15.4)
PERFORMED ON: ABNORMAL
PHOSPHORUS: 6.1 MG/DL (ref 2.5–4.9)
PLATELET # BLD: 301 K/UL (ref 135–450)
PMV BLD AUTO: 8 FL (ref 5–10.5)
POTASSIUM SERPL-SCNC: 5.2 MMOL/L (ref 3.5–5.1)
PROTEIN FLUID: 4 G/DL
RBC # BLD: 3.84 M/UL (ref 4.2–5.9)
SODIUM BLD-SCNC: 124 MMOL/L (ref 136–145)
WBC # BLD: 8.7 K/UL (ref 4–11)

## 2022-10-03 PROCEDURE — 2060000000 HC ICU INTERMEDIATE R&B

## 2022-10-03 PROCEDURE — 6370000000 HC RX 637 (ALT 250 FOR IP): Performed by: NURSE PRACTITIONER

## 2022-10-03 PROCEDURE — 94640 AIRWAY INHALATION TREATMENT: CPT

## 2022-10-03 PROCEDURE — 6360000002 HC RX W HCPCS

## 2022-10-03 PROCEDURE — 6370000000 HC RX 637 (ALT 250 FOR IP): Performed by: INTERNAL MEDICINE

## 2022-10-03 PROCEDURE — 85027 COMPLETE CBC AUTOMATED: CPT

## 2022-10-03 PROCEDURE — 80069 RENAL FUNCTION PANEL: CPT

## 2022-10-03 PROCEDURE — 6370000000 HC RX 637 (ALT 250 FOR IP)

## 2022-10-03 PROCEDURE — 90935 HEMODIALYSIS ONE EVALUATION: CPT

## 2022-10-03 PROCEDURE — 94660 CPAP INITIATION&MGMT: CPT

## 2022-10-03 PROCEDURE — 2580000003 HC RX 258: Performed by: INTERNAL MEDICINE

## 2022-10-03 PROCEDURE — 2700000000 HC OXYGEN THERAPY PER DAY

## 2022-10-03 PROCEDURE — 94761 N-INVAS EAR/PLS OXIMETRY MLT: CPT

## 2022-10-03 RX ORDER — ACETAMINOPHEN 325 MG/1
TABLET ORAL
Status: COMPLETED
Start: 2022-10-03 | End: 2022-10-03

## 2022-10-03 RX ORDER — HEPARIN SODIUM 1000 [USP'U]/ML
INJECTION, SOLUTION INTRAVENOUS; SUBCUTANEOUS
Status: COMPLETED
Start: 2022-10-03 | End: 2022-10-03

## 2022-10-03 RX ADMIN — CYCLOBENZAPRINE 5 MG: 10 TABLET, FILM COATED ORAL at 10:25

## 2022-10-03 RX ADMIN — CYCLOBENZAPRINE 5 MG: 10 TABLET, FILM COATED ORAL at 01:09

## 2022-10-03 RX ADMIN — ACETAMINOPHEN: 325 TABLET, FILM COATED ORAL at 11:44

## 2022-10-03 RX ADMIN — QUETIAPINE FUMARATE 50 MG: 25 TABLET ORAL at 21:12

## 2022-10-03 RX ADMIN — ISOSORBIDE DINITRATE 20 MG: 20 TABLET ORAL at 13:01

## 2022-10-03 RX ADMIN — OXYCODONE AND ACETAMINOPHEN 1 TABLET: 7.5; 325 TABLET ORAL at 21:13

## 2022-10-03 RX ADMIN — TIOTROPIUM BROMIDE AND OLODATEROL 2 PUFF: 3.124; 2.736 SPRAY, METERED RESPIRATORY (INHALATION) at 08:06

## 2022-10-03 RX ADMIN — ISOSORBIDE DINITRATE 20 MG: 20 TABLET ORAL at 21:13

## 2022-10-03 RX ADMIN — CLOPIDOGREL BISULFATE 75 MG: 75 TABLET ORAL at 13:01

## 2022-10-03 RX ADMIN — NEPHROCAP 1 MG: 1 CAP ORAL at 12:59

## 2022-10-03 RX ADMIN — EPOETIN ALFA-EPBX 5400 UNITS: 10000 INJECTION, SOLUTION INTRAVENOUS; SUBCUTANEOUS at 11:27

## 2022-10-03 RX ADMIN — SODIUM CHLORIDE, PRESERVATIVE FREE 10 ML: 5 INJECTION INTRAVENOUS at 21:12

## 2022-10-03 RX ADMIN — SACUBITRIL AND VALSARTAN 0.5 TABLET: 24; 26 TABLET, FILM COATED ORAL at 21:12

## 2022-10-03 RX ADMIN — DULOXETINE HYDROCHLORIDE 30 MG: 30 CAPSULE, DELAYED RELEASE ORAL at 13:01

## 2022-10-03 RX ADMIN — PRAVASTATIN SODIUM 40 MG: 40 TABLET ORAL at 13:01

## 2022-10-03 RX ADMIN — APIXABAN 5 MG: 5 TABLET, FILM COATED ORAL at 13:01

## 2022-10-03 RX ADMIN — HEPARIN SODIUM 3600 UNITS: 1000 INJECTION INTRAVENOUS; SUBCUTANEOUS at 12:00

## 2022-10-03 RX ADMIN — SODIUM CHLORIDE, PRESERVATIVE FREE 10 ML: 5 INJECTION INTRAVENOUS at 13:02

## 2022-10-03 RX ADMIN — METOPROLOL SUCCINATE 100 MG: 50 TABLET, EXTENDED RELEASE ORAL at 13:01

## 2022-10-03 RX ADMIN — OXYCODONE AND ACETAMINOPHEN 1 TABLET: 7.5; 325 TABLET ORAL at 13:00

## 2022-10-03 RX ADMIN — METOPROLOL SUCCINATE 100 MG: 50 TABLET, EXTENDED RELEASE ORAL at 21:12

## 2022-10-03 RX ADMIN — APIXABAN 5 MG: 5 TABLET, FILM COATED ORAL at 21:13

## 2022-10-03 RX ADMIN — ASPIRIN 81 MG 81 MG: 81 TABLET ORAL at 12:59

## 2022-10-03 RX ADMIN — SACUBITRIL AND VALSARTAN 0.5 TABLET: 24; 26 TABLET, FILM COATED ORAL at 13:00

## 2022-10-03 RX ADMIN — PANTOPRAZOLE SODIUM 40 MG: 40 TABLET, DELAYED RELEASE ORAL at 08:11

## 2022-10-03 ASSESSMENT — PAIN DESCRIPTION - LOCATION
LOCATION: HEAD
LOCATION: HEAD
LOCATION: BACK

## 2022-10-03 ASSESSMENT — PAIN - FUNCTIONAL ASSESSMENT: PAIN_FUNCTIONAL_ASSESSMENT: ACTIVITIES ARE NOT PREVENTED

## 2022-10-03 ASSESSMENT — PAIN DESCRIPTION - ORIENTATION
ORIENTATION: MID;INNER
ORIENTATION: LOWER;UPPER
ORIENTATION: INNER

## 2022-10-03 ASSESSMENT — PAIN SCALES - GENERAL
PAINLEVEL_OUTOF10: 8
PAINLEVEL_OUTOF10: 9
PAINLEVEL_OUTOF10: 8
PAINLEVEL_OUTOF10: 9
PAINLEVEL_OUTOF10: 7
PAINLEVEL_OUTOF10: 9
PAINLEVEL_OUTOF10: 6
PAINLEVEL_OUTOF10: 9

## 2022-10-03 ASSESSMENT — PAIN DESCRIPTION - DESCRIPTORS: DESCRIPTORS: ACHING;DISCOMFORT

## 2022-10-03 ASSESSMENT — PAIN DESCRIPTION - PAIN TYPE: TYPE: CHRONIC PAIN

## 2022-10-03 NOTE — CARE COORDINATION
Met with patient to review dc plan and options; states he still plans for dc to [facility]; precert planning in progress. New PT/OT evals needed for precert; MD ordered today. Plan is for dc to [facility] once precert obtained/updated; it  .    LG

## 2022-10-03 NOTE — CARE COORDINATION
Referral in place to [facility] for SNF at dc. PT/OT had signed off so need new orders for evals to update precert with Moira Carter; MD aware and states he will place order (Dr. Dami Carlos).      LG

## 2022-10-03 NOTE — PROGRESS NOTES
[facility] Progress Note           Subjective/   45y.o. year old male who we are seeing in consultation for ESKD on HD. HPI:  Last had extra HD on 10/1 with 3.1 liters removed, post-weight of 103.3 kg. States shortness of breath persists, better with HD now, remains on 3 L NC.    ROS:  Seen during HD today  No chest pain    Objective/   GEN:  Chronically ill, BP (!) 143/97   Pulse 56   Temp 97.7 °F (36.5 °C) (Axillary)   Resp 16   Ht 5' 9\" (1.753 m)   Wt 225 lb 8.5 oz (102.3 kg)   SpO2 99%   BMI 33.31 kg/m²   HEENT: non-icteric, no JVD  CV: S1, S2 without m/r/g; no LE edema  RESP: CTA B w/o w/r/r, decreased on left; breathing wnl  ABD: +bs, soft, nt, no hsm  SKIN: warm, no rashes  ACCESS: Left IJ TDC    Data/  Recent Labs     10/01/22  0458   WBC 8.1   HGB 10.8*   HCT 33.3*   MCV 89.1          Recent Labs     10/01/22  0458   *   K 4.9   CL 91*   CO2 23   GLUCOSE 181*   PHOS 4.0   BUN 47*   CREATININE 5.0*   LABGLOM 12*   GFRAA 15*         Assessment/Plan     # End stage kidney disease - on HD Mon-Wed-Fri  Pre weight 108 kg, BV=958.5 kg; noncompliant with fluid restriction; discussed again, FR at 1200 for now    # NSTEMI    #Hypertension    #s/p TAVR    # Left pleural effusion - increased in size from CT chest on 9/28  may need thoracentesis once off plavix for 5 days, defer to Pulmonary  # COPD/ZAINAB - requiring bi-level    #Anemia of chronic disease - DAVON with HD    # Hyperkalemia - better with HD, reduction in entresto dosing          ____________________________________  Jose Juan Cast MD  [facility]  [email or web address]  Office: [phone]

## 2022-10-03 NOTE — PROGRESS NOTES
10/02/22 2318   NIV Type   NIV Started/Stopped On   Equipment Type v60   Mode Bilevel   Mask Type Full face mask   Mask Size Large   Settings/Measurements   IPAP 18 cmH20   CPAP/EPAP 6 cmH2O   Vt (Measured) 505 mL   Rate Ordered 14   Resp 18   FiO2  30 %   Comfort Level Good   Using Accessory Muscles No   SpO2 97   Alarm Settings   Alarms On Y   Low Pressure (cmH2O) 6 cmH2O   High Pressure (cmH2O) 30 cmH2O

## 2022-10-03 NOTE — PROGRESS NOTES
Hospitalist Progress Note      PCP: Mary Posey MD    Date of Admission: 9/23/2022    Chief Complaint: SOB    Subjective: no new c/o s/p thoracentesis 2 October. Medications:  Reviewed    Infusion Medications    sodium chloride      dextrose       Scheduled Medications    sacubitril-valsartan  0.5 tablet Oral BID    aspirin  81 mg Oral Daily    epoetin siddharth-epbx  5,400 Units IntraVENous Q MWF    insulin lispro  0-4 Units SubCUTAneous TID WC    insulin lispro  0-4 Units SubCUTAneous Nightly    apixaban  5 mg Oral BID    clopidogrel  75 mg Oral Daily    DULoxetine  30 mg Oral Daily    linaclotide  145 mcg Oral QAM AC    isosorbide dinitrate  20 mg Oral TID    pantoprazole  40 mg Oral QAM AC    pravastatin  40 mg Oral Daily    tiotropium-olodaterol  2 puff Inhalation Daily    QUEtiapine  50 mg Oral Nightly    metoprolol succinate  100 mg Oral BID    docusate sodium  100 mg Oral Daily    b complex-C-folic acid  1 capsule Oral Daily    sodium chloride flush  10 mL IntraVENous 2 times per day     PRN Meds: calcium carbonate, perflutren lipid microspheres, oxyCODONE-acetaminophen, heparin (porcine), albuterol sulfate HFA, cyclobenzaprine, ipratropium-albuterol, sodium chloride flush, sodium chloride, promethazine, acetaminophen **OR** acetaminophen, glucose, dextrose bolus **OR** dextrose bolus, glucagon (rDNA), dextrose      Intake/Output Summary (Last 24 hours) at 10/3/2022 0841  Last data filed at 10/3/2022 0055  Gross per 24 hour   Intake 840 ml   Output 30 ml   Net 810 ml         Physical Exam Performed:    BP (!) 143/97   Pulse 56   Temp 97.7 °F (36.5 °C) (Axillary)   Resp 16   Ht 5' 9\" (1.753 m)   Wt 225 lb 8.5 oz (102.3 kg)   SpO2 99%   BMI 33.31 kg/m²     General appearance: No apparent distress, appears stated age and cooperative. HEENT: Pupils equal, round, and reactive to light. Conjunctivae/corneas clear. Neck: Supple, with full range of motion. No jugular venous distention.  Trachea midline. Respiratory:  Normal respiratory effort. Clear to auscultation, bilaterally without Rales/Wheezes/Rhonchi. Cardiovascular: Regular rate and rhythm with normal S1/S2 without murmurs, rubs or gallops. Abdomen: Soft, non-tender, non-distended with normal bowel sounds. Musculoskeletal: No clubbing, cyanosis or edema bilaterally. Full range of motion without deformity. Skin: Skin color, texture, turgor normal.  No rashes or lesions. Neurologic:  Neurovascularly intact without any focal sensory/motor deficits. Cranial nerves: II-XII intact, grossly non-focal.  Psychiatric: Alert and oriented, thought content appropriate, normal insight  Capillary Refill: Brisk,< 3 seconds   Peripheral Pulses: +2 palpable, equal bilaterally       Labs:   Recent Labs     10/01/22  0458   WBC 8.1   HGB 10.8*   HCT 33.3*          Recent Labs     10/01/22  0458   *   K 4.9   CL 91*   CO2 23   BUN 47*   CREATININE 5.0*   CALCIUM 8.9   PHOS 4.0       No results for input(s): AST, ALT, BILIDIR, BILITOT, ALKPHOS in the last 72 hours. No results for input(s): INR in the last 72 hours. No results for input(s): [place] in the last 72 hours.       Urinalysis:      Lab Results   Component Value Date/Time    NITRU Negative 05/29/2022 12:51 PM    [address] 05/29/2022 12:51 PM    BACTERIA 3+ 05/29/2022 12:51 PM    RBCUA 5-10 05/29/2022 12:51 PM    BLOODU TRACE-INTACT 05/29/2022 12:51 PM    SPECGRAV 1.015 05/29/2022 12:51 PM    GLUCOSEU 100 05/29/2022 12:51 PM       Consults:    IP CONSULT TO HOSPITALIST  IP CONSULT TO HEART FAILURE NURSE/COORDINATOR  IP CONSULT TO DIETITIAN  IP CONSULT TO NEPHROLOGY  IP CONSULT TO CARDIOLOGY  IP CONSULT TO PALLIATIVE CARE  IP CONSULT TO PULMONOLOGY      Assessment/Plan:    Active Hospital Problems    Diagnosis     CAD (coronary artery disease) [I25.10]      Priority: High    CHF (congestive heart failure) ([facility] [I50.9]      Priority: High    Pericardial effusion [I31.39] Priority: Medium    SOB (shortness of breath) [R06.02]      Priority: Medium    Cardiomyopathy ([facility] [I42.9]      Priority: Medium    History of transcatheter aortic valve replacement (TAVR) [Z95.2]      Priority: Medium    Uncontrolled hypertension [I10]      Priority: Medium    Asthma-COPD overlap syndrome ([facility] [J44.9]      Priority: Medium    PAF (paroxysmal atrial fibrillation) (HCC) [I48.0]     ESRD (end stage renal disease) on dialysis ([facility] [N18.6, Z99.2]     Pleural effusion on left [J90]     ZAINAB on CPAP [G47.33, Z99.89]     Obesity (BMI 30-39. 9) [E66.9]        CHF - acute on chronic combined sytolic/diastolic failure w/ reduced EF 20-25% by Echo June 2022. Likely due to hypertensive and ischemic heart disease. Continue current medical management and follow I/O as well as clinical response. W/ known CardioRenal syndrome. Cardiology consulted and appreciated. Nephrology consulted and appreciated. Acute on Chronic Respiratory Failure - w/ hypoxia, POArrival, likely 2nd to above. Baseline home O2 at 3L per NC. Presence of clinical respiratory distress w/ tachypnea/dyspnea/SOB and wheezing w/ use of accessory muscles to breath. Supplemental O2 and wean as tolerated. S/P Thoracentesis Sunday 2 October off anticoagulation - last dose of Plavix AM 27 Sept, w/ 1.5L removed. ESRD - on HD M/W/F. Nephrology consulted and appreciated. Anemia - likely 2nd to CKD, w/out evidence of active bleeding/hemolysis. Stable and asymptomatic w/out indication for transfusion. Follow serial labs. Reviewed and documented as above. HTN/CAD - w/ known CAD but no evidence of active signs/sxs of ischemia/failure. Currently controlled on home meds w/ vitals reviewed and documented as above. Afib - chronic paroxysmal of unspecified and clinically unable to determine etiology. Normally rate controlled on BBlocker - continued.   Anticoagulated at baseline on home Eliquis due to secondary hypercoaguable state due to Afib - currently held but anticipate resuming at discharge. Monitored on tele. Left Pleural effusion - w/ plan for  Thoracentesis off anticoagulation - last dose of Plavix AM 27 Sept      DM2 - diet controlled on home oral antiGlycemics/Insulin. Follow FSBS/SSI low regimen. Last HbA1c 7.2% dated this admission. Anticipate resuming/continuing home regimen at discharge. GERD - w/out active signs/sxs of dysphagia/odynophagia. No evidence of active PUD or hx of GI bleed. Controlled on home PPI - continue. Anxiety/Depression - controlled on home medications - continued. Obesity -  With Body mass index is 33.31 kg/m². Complicating assessment and treatment. Placing patient at risk for multiple co-morbidities as well as early death and contributing to the patient's presentation. Counseled on weight loss. ZAINAB - likely 2nd to obesity. Controlled on home CPAP/BiPap - continued, w/ outpatient f/u as previously arranged. DVT Prophylaxis: Eliquis - currently held     Recent Labs     10/01/22  0458          Diet: ADULT DIET; Regular  Code Status: Full Code      PT/OT Eval Status: seen and continuing to assess. Dispo - Patient is likely to remain in-house at least until Monday/Tues 3/4 October pending clinical course, subspecialty recs and placement decision - tentatively [facility].        Anel Wang MD

## 2022-10-03 NOTE — PLAN OF CARE
Patient's EF (Ejection Fraction) is less than 40%    Heart Failure Medications:  Diuretics[de-identified] Furosemide, Torsemide, Spironolactone, Metalozone, and Other    (One of the following REQUIRED for EF </= 40%/SYSTOLIC FAILURE but MAY be used in EF% >40%/DIASTOLIC FAILURE)        ACE[de-identified] None        ARB[de-identified] Valsartan         ARNI[de-identified] None    (Beta Blockers)  NON- Evidenced Based Beta Blocker (for EF% >40%/DIASTOLIC FAILURE): None    Evidenced Based Beta Blocker::(REQUIRED for EF% <40%/SYSTOLIC FAILURE) None  . .................................................................................................................................................. Patient's weights and intake/output reviewed: Yes    Patient's Last Weight: 225 lbs   lbs obtained by Lift Scale. Difference of 11   lbs less than last documented weight. Intake/Output Summary (Last 24 hours) at 10/3/2022 1044  Last data filed at 10/3/2022 0055  Gross per 24 hour   Intake 840 ml   Output 30 ml   Net 810 ml       Education Booklet Provided:  Nurse Marcy Alarcon said that he would give to pt.     Comorbidities Reviewed Yes    Patient has a past medical history of Ambulatory dysfunction, Aortic stenosis, Arthritis, Asthma, Bilateral hilar adenopathy syndrome, CAD (coronary artery disease), Cardiomyopathy ([address] CHF (congestive heart failure) ([address] Chronic pain, COPD (chronic obstructive pulmonary disease) ([address] Depression, Diabetes mellitus ([address] Difficult intravenous access, Emphysema of lung ([address] ESRD (end stage renal disease) on dialysis ([address] Fear of needles, Gastric ulcer, GERD (gastroesophageal reflux disease), Heart valve problem, Hemodialysis patient ([address] History of spinal fracture, Hx of blood clots, Hyperlipidemia, Hypertension, MI (myocardial infarction) ([address] Neuromuscular disorder ([address] Numbness and tingling in left arm, Pneumonia, PONV (postoperative nausea and vomiting), Prolonged emergence from general anesthesia, Sleep apnea, Stroke ([address] TIA (transient ischemic attack), and Unspecified diseases of blood and blood-forming organs. >>For CHF and Comorbidity documentation on Education Time and Topics, please see Education Tab    Progressive Mobility Assessment:  What is this patient's Current Level of Mobility?: Requires Bed Rest  How was this patient Mobilized today?: Edge of Bed, Up to Chair, Bedside Commode,  Up to Toilet/Shower, Up in Room, Up in [place], Unable to Mobilize, and Patient Refuses to Mobilize, ambulated 0   ft                 With Whom? PCA, PT, OT, and Self                 Level of Difficulty/Assistance: Independent     Pt sitting in bed at this time on  3 L O2. Pt denies shortness of breath. Pt with nonpitting lower extremity edema.      Patient and/or Family's stated Goal of Care this Admission: reduce shortness of breath, better understand heart failure and disease management, be more comfortable, and reduce lower extremity edema prior to discharge        :

## 2022-10-04 LAB
ALBUMIN SERPL-MCNC: 3.1 G/DL (ref 3.4–5)
ANION GAP SERPL CALCULATED.3IONS-SCNC: 14 MMOL/L (ref 3–16)
BUN BLDV-MCNC: 50 MG/DL (ref 7–20)
CALCIUM SERPL-MCNC: 8.4 MG/DL (ref 8.3–10.6)
CHLORIDE BLD-SCNC: 88 MMOL/L (ref 99–110)
CO2: 25 MMOL/L (ref 21–32)
CREAT SERPL-MCNC: 6.2 MG/DL (ref 0.9–1.3)
GFR AFRICAN AMERICAN: 11
GFR NON-AFRICAN AMERICAN: 9
GLUCOSE BLD-MCNC: 160 MG/DL (ref 70–99)
GLUCOSE BLD-MCNC: 170 MG/DL (ref 70–99)
GLUCOSE BLD-MCNC: 191 MG/DL (ref 70–99)
GLUCOSE BLD-MCNC: 204 MG/DL (ref 70–99)
GLUCOSE BLD-MCNC: 206 MG/DL (ref 70–99)
GLUCOSE BLD-MCNC: 212 MG/DL (ref 70–99)
HCT VFR BLD CALC: 34.6 % (ref 40.5–52.5)
HEMOGLOBIN: 10.5 G/DL (ref 13.5–17.5)
MAGNESIUM: 1.8 MG/DL (ref 1.8–2.4)
MCH RBC QN AUTO: 28.3 PG (ref 26–34)
MCHC RBC AUTO-ENTMCNC: 30.2 G/DL (ref 31–36)
MCV RBC AUTO: 93.7 FL (ref 80–100)
PDW BLD-RTO: 16.7 % (ref 12.4–15.4)
PERFORMED ON: ABNORMAL
PHOSPHORUS: 5.7 MG/DL (ref 2.5–4.9)
PLATELET # BLD: 224 K/UL (ref 135–450)
PMV BLD AUTO: 7.5 FL (ref 5–10.5)
POTASSIUM SERPL-SCNC: 6 MMOL/L (ref 3.5–5.1)
RBC # BLD: 3.69 M/UL (ref 4.2–5.9)
SODIUM BLD-SCNC: 127 MMOL/L (ref 136–145)
WBC # BLD: 6.9 K/UL (ref 4–11)

## 2022-10-04 PROCEDURE — 6370000000 HC RX 637 (ALT 250 FOR IP): Performed by: INTERNAL MEDICINE

## 2022-10-04 PROCEDURE — 85027 COMPLETE CBC AUTOMATED: CPT

## 2022-10-04 PROCEDURE — 94640 AIRWAY INHALATION TREATMENT: CPT

## 2022-10-04 PROCEDURE — 36415 COLL VENOUS BLD VENIPUNCTURE: CPT

## 2022-10-04 PROCEDURE — 97530 THERAPEUTIC ACTIVITIES: CPT

## 2022-10-04 PROCEDURE — 2700000000 HC OXYGEN THERAPY PER DAY

## 2022-10-04 PROCEDURE — 2580000003 HC RX 258: Performed by: INTERNAL MEDICINE

## 2022-10-04 PROCEDURE — 80069 RENAL FUNCTION PANEL: CPT

## 2022-10-04 PROCEDURE — 97535 SELF CARE MNGMENT TRAINING: CPT

## 2022-10-04 PROCEDURE — 99232 SBSQ HOSP IP/OBS MODERATE 35: CPT | Performed by: NURSE PRACTITIONER

## 2022-10-04 PROCEDURE — 97164 PT RE-EVAL EST PLAN CARE: CPT

## 2022-10-04 PROCEDURE — 83735 ASSAY OF MAGNESIUM: CPT

## 2022-10-04 PROCEDURE — 97168 OT RE-EVAL EST PLAN CARE: CPT

## 2022-10-04 PROCEDURE — 94660 CPAP INITIATION&MGMT: CPT

## 2022-10-04 PROCEDURE — 94761 N-INVAS EAR/PLS OXIMETRY MLT: CPT

## 2022-10-04 PROCEDURE — 97116 GAIT TRAINING THERAPY: CPT

## 2022-10-04 PROCEDURE — 1200000000 HC SEMI PRIVATE

## 2022-10-04 RX ORDER — MECOBALAMIN 5000 MCG
10 TABLET,DISINTEGRATING ORAL NIGHTLY
Status: DISCONTINUED | OUTPATIENT
Start: 2022-10-05 | End: 2022-10-05 | Stop reason: HOSPADM

## 2022-10-04 RX ADMIN — SODIUM CHLORIDE, PRESERVATIVE FREE 10 ML: 5 INJECTION INTRAVENOUS at 11:01

## 2022-10-04 RX ADMIN — PANTOPRAZOLE SODIUM 40 MG: 40 TABLET, DELAYED RELEASE ORAL at 05:16

## 2022-10-04 RX ADMIN — SODIUM CHLORIDE, PRESERVATIVE FREE 10 ML: 5 INJECTION INTRAVENOUS at 20:22

## 2022-10-04 RX ADMIN — APIXABAN 5 MG: 5 TABLET, FILM COATED ORAL at 20:16

## 2022-10-04 RX ADMIN — ISOSORBIDE DINITRATE 20 MG: 20 TABLET ORAL at 16:58

## 2022-10-04 RX ADMIN — CLOPIDOGREL BISULFATE 75 MG: 75 TABLET ORAL at 11:00

## 2022-10-04 RX ADMIN — SACUBITRIL AND VALSARTAN 0.5 TABLET: 24; 26 TABLET, FILM COATED ORAL at 20:15

## 2022-10-04 RX ADMIN — QUETIAPINE FUMARATE 50 MG: 25 TABLET ORAL at 20:16

## 2022-10-04 RX ADMIN — NEPHROCAP 1 MG: 1 CAP ORAL at 11:00

## 2022-10-04 RX ADMIN — METOPROLOL SUCCINATE 100 MG: 50 TABLET, EXTENDED RELEASE ORAL at 11:00

## 2022-10-04 RX ADMIN — METOPROLOL SUCCINATE 100 MG: 50 TABLET, EXTENDED RELEASE ORAL at 20:16

## 2022-10-04 RX ADMIN — APIXABAN 5 MG: 5 TABLET, FILM COATED ORAL at 11:00

## 2022-10-04 RX ADMIN — SACUBITRIL AND VALSARTAN 0.5 TABLET: 24; 26 TABLET, FILM COATED ORAL at 11:00

## 2022-10-04 RX ADMIN — PRAVASTATIN SODIUM 40 MG: 40 TABLET ORAL at 11:00

## 2022-10-04 RX ADMIN — OXYCODONE AND ACETAMINOPHEN 1 TABLET: 7.5; 325 TABLET ORAL at 16:52

## 2022-10-04 RX ADMIN — DULOXETINE HYDROCHLORIDE 30 MG: 30 CAPSULE, DELAYED RELEASE ORAL at 11:00

## 2022-10-04 RX ADMIN — OXYCODONE AND ACETAMINOPHEN 1 TABLET: 7.5; 325 TABLET ORAL at 23:29

## 2022-10-04 RX ADMIN — ISOSORBIDE DINITRATE 20 MG: 20 TABLET ORAL at 11:00

## 2022-10-04 RX ADMIN — CYCLOBENZAPRINE 5 MG: 10 TABLET, FILM COATED ORAL at 20:19

## 2022-10-04 RX ADMIN — TIOTROPIUM BROMIDE AND OLODATEROL 2 PUFF: 3.124; 2.736 SPRAY, METERED RESPIRATORY (INHALATION) at 08:58

## 2022-10-04 ASSESSMENT — PAIN DESCRIPTION - DESCRIPTORS
DESCRIPTORS: ACHING;SORE
DESCRIPTORS: ACHING;SORE

## 2022-10-04 ASSESSMENT — PAIN DESCRIPTION - ORIENTATION: ORIENTATION: RIGHT;LEFT

## 2022-10-04 ASSESSMENT — PAIN SCALES - GENERAL
PAINLEVEL_OUTOF10: 7
PAINLEVEL_OUTOF10: 7
PAINLEVEL_OUTOF10: 0

## 2022-10-04 ASSESSMENT — PAIN DESCRIPTION - LOCATION
LOCATION: BACK
LOCATION: PELVIS;BACK

## 2022-10-04 NOTE — PROGRESS NOTES
Physical Therapy  Facility/Department: [facility] [address] [facility]  Re-Evaluation/Daily Treatment Note  NAME: Dorinda Rushing  : 1970  MRN: [id number]    Date of Service: 10/4/2022    Discharge Recommendations:  Home with assist PRN, Home with Home health PT   PT Equipment Recommendations  Equipment Needed: No    Patient Diagnosis(es): The primary encounter diagnosis was Acute on chronic respiratory failure, unspecified whether with hypoxia or hypercapnia ([address] Diagnoses of Pleural effusion on left, Pneumonia of left lower lobe due to infectious organism, and Uncontrolled hypertension were also pertinent to this visit. Assessment   Assessment: Pt referred for PT re-evaluation during current hospital stay to evaluate for possible rehab/SNF needs post-discharge. Pt continues to demonstrate baseline LOF with strength and functional mobility, ambulating distances of 75 feet with RW at SBA/supervision level. Pt reports he typically only ambulates household distances of 5-10 feet at baseline and is quite sedentary at home. Pt would benefit from continuing to work with PT in acute setting to build strength/endurance and improve stamina. Recommend pt return home with assist PRN from family & home PT services. Pt is too high-level from a functional standpoint to require SNF. Activity Tolerance: Patient tolerated treatment well;Patient limited by endurance  Equipment Needed: No     Plan    General Plan: 3-5 times per week  Specific Instructions for Next Treatment: Progress ther ex and mobility as tolerated  Current Treatment Recommendations: Strengthening;Balance training;Functional mobility training;Transfer training; Endurance training;Gait training;Home exercise program;Safety education & training; Therapeutic activities     Restrictions  Restrictions/Precautions  Restrictions/Precautions: General Precautions  Required Braces or Orthoses?: No     Subjective    Subjective: Pt agreeable to work with PT this morning, pleasant and cooperative. States he feels he's gotten a bit weaker since being in the hospital.  Pain: Pt c/o 10/10 chronic back and B hip pain. Overall Orientation Status: Within Normal Limits     Objective   Vitals  Heart Rate: 60  Heart Rate Source: Monitor  BP: 131/78  BP Location: Right upper arm  BP Method: Automatic  Patient Position: Sitting  MAP (Calculated): 95.67  SpO2: 98 %  O2 Device: Nasal cannula (3L O2)    Bed Mobility Training  Supine to Sit: Modified independent  Sit to Supine: Modified independent  Scooting: Independent (to scoot forward<>backward at EOB)    Balance  Sitting: Intact  Standing: Intact    Transfer Training  Sit to Stand: Stand-by assistance; Additional time  Stand to Sit: Stand-by assistance    Gait Training  Overall Level of Assistance: Supervision  Interventions: Verbal cues; Safety awareness training  Speed/Ольга: Pace decreased (< 100 feet/min); Shuffled; Slow  Step Length: Right shortened;Left shortened  Gait Abnormalities: Decreased step clearance (pt amb with limited foot clearance B but with steady gait despite slow pace, no LOB (2-3 standing rest breaks needed while amb, every 20-30 feet or so, due to increased SOB))  Distance (ft): 75 Feet (amb distance limited by increased SOB/work of breathing)  Assistive Device: Walker, rolling    Other Specialty Interventions  Other Treatments/Modalities: Re-evaluation performed. BLE AROM: WFL. BLE Strength: Grossly 4+/5 throughout [facility]). Safety Devices  Type of Devices: All fall risk precautions in place;Call light within reach; Left in bed;Nurse notified     [facility] 6 Clicks Inpatient Mobility:  AM-PAC Mobility Inpatient   How much difficulty turning over in bed?: None  How much difficulty sitting down on / standing up from a chair with arms?: None  How much difficulty moving from lying on back to sitting on side of bed?: None  How much help from another person moving to and from a bed to a chair?: None  How much help from another person needed to walk in hospital room?: A Little  How much help from another person for climbing 3-5 steps with a railing?: A Little  AM-PAC Inpatient Mobility Raw Score : 22  AM-PAC Inpatient T-Scale Score : 53.28  Mobility Inpatient CMS 0-100% Score: 20.91  Mobility Inpatient CMS G-Code Modifier : CJ    Goals  Short Term Goals  Time Frame for Short Term Goals: 10/11/22, unless otherwise specified  Short Term Goal 1: Pt will transfer supine <-> sit with (I)  Short Term Goal 2: Pt will transfer sit <-> stand with (I)  Short Term Goal 3: Pt will ambulate x 100 feet using RW/4WW with modified(I)  Short Term Goal 4: By 10/07/22: Pt will tolerate 12-15 reps BLE exercise for strengthening, balance, and endurance  Additional Goals?: No  Patient Goals   Patient Goals : \"To go home\"    Education  Patient Education  Education Given To: Patient  Education Provided: Role of Therapy;Plan of Care;Transfer Training;Energy Conservation  Education Method: Verbal  Barriers to Learning: None  Education Outcome: Verbalized understanding;Demonstrated understanding    Therapy Time   Individual Concurrent Group Co-treatment   Time In 0930         Time Out 1004         Minutes 34         Timed Code Treatment Minutes: [address] [place], [place], [address]    If pt is unable to be seen after this session, please let this note serve as discharge summary. Please see case management note for discharge disposition. Thank you.

## 2022-10-04 NOTE — CARE COORDINATION
Referred to patient for d/c planning. Spoke to patient. Patient does not qualify for SNF. Patient prefers to return to [facility]. Per patient, he no longer has [employer] but now has [employer]. Spoke to facility. They will attempt to cert through [facility]. Referral made to registration to update patient information. RN updated.   Electronically signed by NINO Rodas on 10/4/2022 at 11:48 AM

## 2022-10-04 NOTE — PROGRESS NOTES
Occupational Therapy  Facility/Department: [place] [address] [facility]  Re-evaluation/Daily Treatment Note/Discharge Summary   1x only  NAME: Pavel Seo  : 1970  MRN: [id number]    Date of Service: 10/4/2022    Discharge Recommendations:  24 hour supervision or assist, Home with Home health OT, S Level 2   Meals on wheels      Patient Diagnosis(es): The primary encounter diagnosis was Acute on chronic respiratory failure, unspecified whether with hypoxia or hypercapnia ([address] Diagnoses of Pleural effusion on left, Pneumonia of left lower lobe due to infectious organism, and Uncontrolled hypertension were also pertinent to this visit. Assessment    Assessment: Patient re-evaluated d/t LOS and report of weakness to nursing. Today patient mod I for bed mobility with SBA to supervision with RW for mobility and transfers. Pt reports having difficulty fixing meals at home. Pt with no OT needs, however may benefit from aid services or MOWs at discharge. Activity Tolerance: Patient tolerated treatment well;Patient limited by endurance  Discharge Recommendations: 24 hour supervision or assist;Home with Home health OT;S Level 2      Plan   Occupational Therapy Plan  Times Per Week: 1x only     Restrictions   Restrictions/Precautions  Restrictions/Precautions: General Precautions  Required Braces or Orthoses?: No     Subjective   Orientation  Overall Orientation Status: Within Normal Limits  Pain: Pt c/o 10/10 chronic back and B hip pain.        \"My legs are weak, and I need help with fixing meals\"  Overall Orientation Status: Within Normal Limits   Objective    Vitals  Heart Rate: 60  Heart Rate Source: Monitor  BP: 131/78  BP Location: Right upper arm  BP Method: Automatic  Patient Position: Sitting  MAP (Calculated): 95.67  SpO2: 98 %  O2 Device: Nasal cannula (3L O2)     Bed Mobility Training  Bed Mobility Training: Yes  Supine to Sit: Modified independent  Sit to Supine: Modified independent  Scooting: Independent (to scoot forward<>backward at EOB)    Balance  Sitting: Intact  Standing: Intact  Transfer Training  Transfer Training: Yes  Overall Level of Assistance: Stand-by assistance  Sit to Stand: Stand-by assistance; Additional time  Stand to Sit: Stand-by assistance    Gait Training  Gait Training: Yes  Gait  Overall Level of Assistance: Supervision  Interventions: Verbal cues; Safety awareness training  Speed/Ольга: Pace decreased (< 100 feet/min); Shuffled; Slow  Step Length: Right shortened;Left shortened  Gait Abnormalities: Decreased step clearance (pt amb with limited foot clearance B but with steady gait despite slow pace, no LOB (2-3 standing rest breaks needed while amb, every 20-30 feet or so, due to increased SOB))  Distance (ft): 75 Feet (amb distance limited by increased SOB/work of breathing)  Assistive Device: Walker, rolling     ADL  Feeding: Independent  Grooming: Setup  LE Dressing: Independent (donning socks)  Toileting Skilled Clinical Factors: Pt declined states he has been taking himself to the bathroom independently. Safety Devices  Type of Devices: All fall risk precautions in place;Call light within reach; Left in bed;Nurse notified     Patient Education  Education Given To: Patient  Education Provided: Role of Therapy;Transfer Training;Plan of Care;Energy Conservation; ADL Adaptive Strategies  Barriers to Learning: None  Education Outcome: Verbalized understanding;Demonstrated understanding    Goals  Short Term Goals  Time Frame for Short Term Goals: 1x only  Pt will complete LE dressing with supervision-stg met 10/4/22       Therapy Time   Individual Concurrent Group Co-treatment   Time In 0930         Time Out 1004         Minutes 34         Timed Code Treatment Minutes: 24 Minutes       SHANE Henderson/L

## 2022-10-04 NOTE — PROGRESS NOTES
10/04/22 0026   NIV Type   $NIV $Daily Charge   Equipment Type V60   Mode Bilevel   Mask Type Full face mask   Mask Size Large   Settings/Measurements   IPAP 18 cmH20   CPAP/EPAP 6 cmH2O   Vt (Measured) 579 mL   Rate Ordered 14   Resp 19   FiO2  30 %   I Time/ I Time % 1 s   Minute Volume (L/min) 9.3 Liters   Mask Leak (lpm) 33 lpm   Comfort Level Good   Using Accessory Muscles No   Alarm Settings   Alarms On Y

## 2022-10-04 NOTE — PROGRESS NOTES
10/03/22 2203   NIV Type   NIV Started/Stopped On   Equipment Type v60   Mode Bilevel   Mask Type Full face mask   Mask Size Large   Settings/Measurements   IPAP 18 cmH20   CPAP/EPAP 6 cmH2O   Vt (Measured) 606 mL   Rate Ordered 14   Resp 20   FiO2  30 %   Mask Leak (lpm) 28 lpm   Comfort Level Good   Alarm Settings   Alarms On Y   Low Pressure (cmH2O) 6 cmH2O   High Pressure (cmH2O) 30 cmH2O

## 2022-10-04 NOTE — PROGRESS NOTES
10/04/22 0307   NIV Type   NIV Started/Stopped On   Equipment Type v60   Mode Bilevel   Mask Type Full face mask   Mask Size Large   Settings/Measurements   IPAP 18 cmH20   CPAP/EPAP 6 cmH2O   Vt (Measured) 566 mL   Rate Ordered 14   Resp 16   FiO2  30 %   Mask Leak (lpm) 29 lpm   Comfort Level Good   Using Accessory Muscles No   SpO2 98   Alarm Settings   Alarms On Y

## 2022-10-04 NOTE — ACP (ADVANCE CARE PLANNING)
Advance Care Planning     Advance Care Planning (Inpatient)  Conversation Note      Date of ACP Conversation: 10/4/2022     Conversation Conducted with: Patient with Decision Making Capacity    ACP: Orlando Ortiz RN    Health Care Decision Maker:     Current Designated Health Care Decision Maker:     Primary Decision Maker: Crystal Ann - Spouse - [phone]    Secondary Decision Maker: Santa Mcdaniel - Brother/Sister - [phone]  Click here to complete Healthcare Decision Makers including section of the Healthcare Decision Maker Relationship (ie \"Primary\")  Today we documented Decision Maker(s) consistent with ACP documents on file. Palliative Care Initial Note  Palliative Care Admit date:  10/4/22  Reason for c/s:  GOC, Code status    Advance Directives:  Reviewed pts AD's in this EMR and he designated his spouse, Gabi Crain, as his healthcare proxy. Pt currently has a full code status. He reports having a prior DNR but rescinded that order \"because my wife couldn't handle it. \"   We had a lengthy discussion re: resuscitation and the likelihood he would not reap meaningful benefit from such heroics. Lela Aguila doesn't argue that point though states he remains a full code \"because my wife isn't ready to lose me yet. \"  He would be more likely to consider amending code status if it weren't for his perceived push back from spouse. Plan of care/goals:  Met w/ Lela Aguila, who was very receptive to discussion. He laments his numerous hospitalizations and feels as though he \"isn't any better\" as a result of recurring admissions. Rather, hospitalizations only \"make my symptoms manageable. \"   We have discussed the likelihood recurrent admissions will continue and the option to shift the focus of his care to comfort to avoid re-admissions and focus on symptom mgt. During the course of our discussion, Lela Aguila shared the fear of dying he feels when his symptoms require calling 911.   Then, later, he expressed not knowing Please print clinic note and mail home. Thanks! \"how much more I can stand\" and \"time feels like it is growing short. \"   Jann Gustafson stated that he has made attempts to speak w/ his family @ large about his wishes but feels his greatest barrier is wife's difficulty w/ accepting his status. Social/Spiritual:   for 36 years, three children and many grandchildren. Jann Gustafson worked as a  and is saddened by [employer] my body lets me down now. \"   Jann Gustafson stated his primary difficulty in considering alterations to goc is that \"my wife isn't ready for that, she tells me I can't leave her. \"      Plan:  Message left for spouse requesting return call. Pt prefers to maintain full code and current plan of care    Reason for consult:  _X_ Advance Care Planning  ___ Transition of Care Planning  _X_ Psychosocial/Spiritual Support  ___ Symptom Management      Care Preferences    Ventilation: \"If you were in your present state of health and suddenly became very ill and were unable to breathe on your own, what would your preference be about the use of a ventilator (breathing machine) if it were available to you? \"    Would the patient desire the use of ventilator (breathing machine)?: yes    \"If your health worsens and it becomes clear that your chance of recovery is unlikely, what would your preference be about the use of a ventilator (breathing machine) if it were available to you? \"     Would the patient desire the use of ventilator (breathing machine)?: Unlikely but requires ongoing discussion    Resuscitation  \"CPR works best to restart the heart when there is a sudden event, like a heart attack, in someone who is otherwise healthy. Unfortunately, CPR does not typically restart the heart for people who have serious health conditions or who are very sick. \"    \"In the event your heart stopped as a result of an underlying serious health condition, would you want attempts to be made to restart your heart (answer \"yes\" for attempt to resuscitate) or would you prefer a natural death (answer \"no\" for do not attempt to resuscitate)? \" yes    Length of ACP Conversation in minutes:  36    Conversation Outcomes:  [x] ACP discussion completed  [x] Existing advance directive reviewed with patient; no changes to patient's previously recorded wishes  [] New Advance Directive completed  [] Portable Do Not Rescitate prepared for Provider review and signature  [] POLST/POST/MOLST/MOST prepared for Provider review and signature      Follow-up plan:    [] Schedule follow-up conversation to continue planning  [] Referred individual to Provider for additional questions/concerns   [] Advised patient/agent/surrogate to review completed ACP document and update if needed with changes in condition, patient preferences or care setting    [] This note routed to one or more involved healthcare providers

## 2022-10-04 NOTE — PROGRESS NOTES
[facility] Progress Note           Subjective/   45y.o. year old male who we are seeing in consultation for ESKD on HD. HPI:  Readmitted with fluid overload    ROS:  Reached 104.8 kg  at HD on 10/3   DW~103.5  No chest pain  SOB at baseline    Objective/   GEN:  Chronically ill, /78   Pulse 60   Temp 98.1 °F (36.7 °C) (Oral)   Resp 18   Ht 5' 9\" (1.753 m)   Wt 231 lb 0.7 oz (104.8 kg)   SpO2 98%   BMI 34.12 kg/m²   HEENT: non-icteric, no JVD  CV: S1, S2 without m/r/g; no LE edema  RESP: CTA B w/o w/r/r, decreased on left; breathing wnl  ABD: +bs, soft, nt, no hsm  SKIN: warm, no rashes  ACCESS: Left IJ TDC    Data/  Recent Labs     10/03/22  0835   WBC 8.7   HGB 10.8*   HCT 33.9*   MCV 88.3          Recent Labs     10/03/22  0835   *   K 5.2*   CL 85*   CO2 24   GLUCOSE 125*   PHOS 6.1*   BUN 61*   CREATININE 6.2*   LABGLOM 9*   GFRAA 11*         Assessment/Plan     # End stage kidney disease - on HD Mon-Wed-Fri   PE=909.5 kg; noncompliant with fluid restriction; discussed again, FR at 1200 for now    # NSTEMI    #Hypertension    #s/p TAVR    # Left pleural effusion - increased in size from CT chest on 9/28  may need thoracentesis once off plavix for 5 days, defer to Pulmonary  # COPD/ZAINAB - requiring bi-level    #Anemia of chronic disease - DAVON with HD    # Hyperkalemia - better with HD, reduction in entresto dosing          ____________________________________  Fred Wells MD  [facility]  [email or web address]  Office: [phone]

## 2022-10-04 NOTE — PROGRESS NOTES
[facility]   Daily Progress Note    Admit Date:  9/23/2022  HPI:    Chief Complaint   Patient presents with    Respiratory Distress     Squad reporting pt called c/o SOB x 3 days. Only received 45min of dialysis today due to feeling SOB. Squad reports pt was tripoding upon their arrival with a RA sat of 96%. They report patient was not moving any air. 1 duoneb and 1 albuterol given in route to the ED along with BIPAP. Interval history: Julio Squires is being followed for CHF, shortness of breath, elevated troponin. Subjective:  Mr. Marcus Pereira breathing is improved s/p thoracentesis. Having pleuritic left chest pain.      Objective:   BP (!) 143/85   Pulse 63   Temp 98.1 °F (36.7 °C) (Oral)   Resp 18   Ht 5' 9\" (1.753 m)   Wt 231 lb 0.7 oz (104.8 kg)   SpO2 99%   BMI 34.12 kg/m²   No intake or output data in the 24 hours ending 10/04/22 0919      NYHA: IV    Physical Exam:  General:  Awake, alert, NAD, appears older than stated age  Skin:  Warm and dry  Neck:  JVD difficult to assess   Chest: clear right side, dim left side   Telemetry: not currently on tele   Cardiovascular:  RRR S1S2, no m/r/g   Abdomen:  Soft, nontender, +bowel sounds  Extremities:  no  bilateral lower extremity edema    Medications:    sacubitril-valsartan  0.5 tablet Oral BID    aspirin  81 mg Oral Daily    epoetin siddharth-epbx  5,400 Units IntraVENous Q MWF    insulin lispro  0-4 Units SubCUTAneous TID WC    insulin lispro  0-4 Units SubCUTAneous Nightly    apixaban  5 mg Oral BID    clopidogrel  75 mg Oral Daily    DULoxetine  30 mg Oral Daily    linaclotide  145 mcg Oral QAM AC    isosorbide dinitrate  20 mg Oral TID    pantoprazole  40 mg Oral QAM AC    pravastatin  40 mg Oral Daily    tiotropium-olodaterol  2 puff Inhalation Daily    QUEtiapine  50 mg Oral Nightly    metoprolol succinate  100 mg Oral BID    docusate sodium  100 mg Oral Daily    b complex-C-folic acid  1 capsule Oral Daily  sodium chloride flush  10 mL IntraVENous 2 times per day      sodium chloride      dextrose         Lab Data:  CBC:   Recent Labs     10/03/22  0835   WBC 8.7   HGB 10.8*        BMP:    Recent Labs     10/03/22  0835   *   K 5.2*   CO2 24   BUN 61*   CREATININE 6.2*     INR:  No results for input(s): INR in the last 72 hours. BNP:    Recent Labs     10/02/22  0512   PROBNP 74,364*       Lab Results   Component Value Date    LVEF 20 2022    LVEFMODE Echo 10/16/2019       Testing:    Procedure Date:  2022  Patient Name: Pavel Seo  MRN: [id number]      : 1970  INDICATION      High risk abnormal stress test  Ischemic or mildly  Non-STEMI     PROCEDURES PERFORMED      Right heart catheterization  Left heart catheterization  LVgram  Coronary angiogam  Coronary cath  Monitoring of moderate conscious sedation     PROCEDURE DESCRIPTION   Risks/benefits/alternatives/outcomes were discussed with patient and/or family and informed consent was obtained. Using the [facility] scale, the patient's right radial artery was found to be a level B. Patient was prepped draped in the usual sterile fashion. Local anaesthetic was applied over puncture site. Using ultrasound guidance and a front wall technique, a 4/5 Camb[place]ian Terumo sheath was inserted into right radial artery. Verapamil, nitroglycerin, nicardipine were administered through the sheath. Heparin was administered. Diagnostic 5 Flaco Munoz pigtail, TIG, Binu catheters were used for diagnostic angiograms. At the conclusion of the procedure, a TR band was placed over the puncture site and hemostasis was obtained. There were no immediate complications. I supervised sedation from 9:53 AM to 11:08 AM with versed 5 mg/fentanyl 125 mcg during the procedure. An independent trained observer pushed Digifeye at my direction.   We monitored the patient's level of consciousness and vital signs/physiologic status throughout the procedure duration (see times listed previously). 205 cc contrast was utilized. <20cc EBL. Case/findings/plans discussed with patient's wife, she understood/agreed. FINDINGS      HEMODYNAMICS     CHAMBER PRESSURE SATURATION   RA  10 60%   RV  47/9     PA  50/20  67%   PW  19     AORTA  101/50  95%      NANCY CARDIAC OUTPUT  5.8 L/min   SVR  756    PVR  234      LVGRAM     LVEDP  21   GRADIENT ACROSS AORTIC VALVE  less than 5 mmHg   LV FUNCTION EF 20%   WALL MOTION  severe global hypokinesis with regional variation   MITRAL REGURGITATION  mild      CORONARY ARTERIES     LM Less than 10% cprqeybe-jwz-vkbehq stenosis            LAD Moderately calcified, 20 to 30% proximal-mid stenosis, distal vessel tapers at the apex with 60% diffuse disease. D1 has an ostial/proximal 75 to 80% stenosis. LCX  Calcified, proximal-mid 75 to 80% stenosis. Distal vessel is tortuous with 70 to 85% diffuse stenosis with more discrete stenosis noted distally. OM 1 is a very small vessel with ostial/proximal 80% stenosis and 60 to 70% diffuse disease in the mjgkiddx-gld-jjvvbh vessel. Eliana Ruffing RCA Dominant, calcified, tortuous, there is a proximal-mid stent with 60 to 70% in-stent restenosis. Distal vessel 20 to 30% stenosis         CONCLUSIONS:      Mild to moderately increased filling pressures  Patent RCA stent with moderate to borderline severe in-stent restenosis  Severe circumflex and D1 stenosis  Continue medical management, consider outpatient high risk PCI of above territories pending outpatient clinical follow-up. Currently medical management seems best given comorbidities and need for additional fluid removal.  If PCI is pursued, will likely need general anesthesia. Patient had difficulty lying still on Cath Lab table.      Okay to resume Eliquis tomorrow, case/plan/findings discussed with patient and wife, they understand/agree, they stated it is incorrectly stated in chart the patient would refuse blood, he is okay to receive blood products if needed. Continue beta-blocker and Entresto    Echo 5/29/22   Summary   Definity® used for myocardial border enhancement. Left ventricular systolic function is severely reduced with a visually   estimated ejection fraction of 20-25 %. EF estimated by Jasmine's method at 24 %. The left ventricle is mildly dilated in size with normal wall thickness. Severe global hypokinesis with regional variation. Grade I diastolic dysfunction with normal LV pressure. There is no evidence of a left ventricular thrombus. Right ventricular systolic function is reduced. A 29 mm Langford Leyda S3 bioprosthetic aortic valve appears well seated   with normal Doppler values. Inadequate tricuspid valve regurgitation to estimate systolic pulmonary   artery pressure. There is a moderate left pleural effusion noted. Echo 9/28/22   Summary   Patient refused use of Definity contrast.   Limited only f/u for LVEF and s/p TAVR. The left ventricular systolic function appears severely reduced with   visually estimated ejection fraction of 20-25%. Poor endocardial visualization. By history, there is a #29 mm Langford Leyda S3 bioprosthetic valve in the   aortic position. The valve appears well seated with no rocking motion. Valve   leaflets are not well-evaluated. Normal Doppler values. No evidence of aortic valve regurgitation. Pericardial effusion, noted darrick-apically where measures up to 1.5 cm,   anteriorly along right ventricle, with exudate. Appears likely trace along   lateral wall. Difficult to discern apically/laterally however, whether   pericardial or tracking pleural effusion. No tamponade physiology. There is a large left pleural effusion. Non-contrasted CT is suggested for further characterization of pericardial   versus pleural effusion. CHest CT 9/28/22   Impression:       1.  Increased size of a large left pleural effusion with adjacent   consolidation in the left upper and lower lobes, particularly the lower lobe. Airspace consolidation could represent atelectasis related to the effusion or   pneumonia. 2. Stable 3 mm left upper lobe lung nodule. If patient is considered high   risk, a 1 year follow-up chest CT is suggested. Principal Problem:    CHF (congestive heart failure) (HCC)  Active Problems:    CAD (coronary artery disease)    Asthma-COPD overlap syndrome (HCC)    Uncontrolled hypertension    History of transcatheter aortic valve replacement (TAVR)    Cardiomyopathy (HCC)    SOB (shortness of breath)    Pericardial effusion    Obesity (BMI 30-39.9)    ZAINAB on CPAP    Pleural effusion on left    ESRD (end stage renal disease) on dialysis (HCC)    PAF (paroxysmal atrial fibrillation) ([address]  Resolved Problems:    * No resolved hospital problems. *      Assessment:  Shortness of breath  ELevated troponin- chronically elevated    PAF  Multivessel CAD; CAD s/p PCI to LAD in 2015, RCA 1/2022   - severe LCx and D1 stenosis 6/2022 - med mgmt   AS S/P TAVR 2019  Ishcemic cardiomyopathy  HTN, DM, HLD  PAD s/p PTA right iliac artery  Left pleural effusion - large with consolidation; s/p Left thoracentesis 10/2/22 with 1850ml removed  ESRD on HD  ZAINAB   Hx of CVA      Plan:  Restart eliquis and plavix; d/c aspirin   Volume control HD  Toprol 100mg BID  Entresto 24-26mg BID 1/2 tab due to higher potassium   Isordil 20mg TID    Incentive spirometer  No further cardiology recommendations, will sign off.      JAY Salomon - CNP,  10/4/2022, 1:48 PM

## 2022-10-04 NOTE — PROGRESS NOTES
Hospitalist Progress Note      PCP: John Navarro MD    Date of Admission: 9/23/2022    Chief Complaint: SOB    Subjective: no new c/o s/p thoracentesis 2 October. Medications:  Reviewed    Infusion Medications    sodium chloride      dextrose       Scheduled Medications    sacubitril-valsartan  0.5 tablet Oral BID    epoetin siddharth-epbx  5,400 Units IntraVENous Q MWF    insulin lispro  0-4 Units SubCUTAneous TID WC    insulin lispro  0-4 Units SubCUTAneous Nightly    apixaban  5 mg Oral BID    clopidogrel  75 mg Oral Daily    DULoxetine  30 mg Oral Daily    linaclotide  145 mcg Oral QAM AC    isosorbide dinitrate  20 mg Oral TID    pantoprazole  40 mg Oral QAM AC    pravastatin  40 mg Oral Daily    tiotropium-olodaterol  2 puff Inhalation Daily    QUEtiapine  50 mg Oral Nightly    metoprolol succinate  100 mg Oral BID    docusate sodium  100 mg Oral Daily    b complex-C-folic acid  1 capsule Oral Daily    sodium chloride flush  10 mL IntraVENous 2 times per day     PRN Meds: calcium carbonate, perflutren lipid microspheres, oxyCODONE-acetaminophen, heparin (porcine), albuterol sulfate HFA, cyclobenzaprine, ipratropium-albuterol, sodium chloride flush, sodium chloride, promethazine, acetaminophen **OR** acetaminophen, glucose, dextrose bolus **OR** dextrose bolus, glucagon (rDNA), dextrose    No intake or output data in the 24 hours ending 10/04/22 1243      Physical Exam Performed:    /78   Pulse 60   Temp 98.1 °F (36.7 °C) (Oral)   Resp 18   Ht 5' 9\" (1.753 m)   Wt 231 lb 0.7 oz (104.8 kg)   SpO2 98%   BMI 34.12 kg/m²     General appearance: No apparent distress, appears stated age and cooperative. HEENT: Pupils equal, round, and reactive to light. Conjunctivae/corneas clear. Neck: Supple, with full range of motion. No jugular venous distention. Trachea midline. Respiratory:  Normal respiratory effort. Clear to auscultation, bilaterally without Rales/Wheezes/Rhonchi.   Cardiovascular: Regular rate and rhythm with normal S1/S2 without murmurs, rubs or gallops. Abdomen: Soft, non-tender, non-distended with normal bowel sounds. Musculoskeletal: No clubbing, cyanosis or edema bilaterally. Full range of motion without deformity. Skin: Skin color, texture, turgor normal.  No rashes or lesions. Neurologic:  Neurovascularly intact without any focal sensory/motor deficits. Cranial nerves: II-XII intact, grossly non-focal.  Psychiatric: Alert and oriented, thought content appropriate, normal insight  Capillary Refill: Brisk,< 3 seconds   Peripheral Pulses: +2 palpable, equal bilaterally       Labs:   Recent Labs     10/03/22  0835   WBC 8.7   HGB 10.8*   HCT 33.9*          Recent Labs     10/03/22  0835   *   K 5.2*   CL 85*   CO2 24   BUN 61*   CREATININE 6.2*   CALCIUM 8.8   PHOS 6.1*       No results for input(s): AST, ALT, BILIDIR, BILITOT, ALKPHOS in the last 72 hours. No results for input(s): INR in the last 72 hours. No results for input(s): Valiant Medal in the last 72 hours.       Urinalysis:      Lab Results   Component Value Date/Time    NITRU Negative 05/29/2022 12:51 PM    [address] 05/29/2022 12:51 PM    BACTERIA 3+ 05/29/2022 12:51 PM    RBCUA 5-10 05/29/2022 12:51 PM    BLOODU TRACE-INTACT 05/29/2022 12:51 PM    SPECGRAV 1.015 05/29/2022 12:51 PM    GLUCOSEU 100 05/29/2022 12:51 PM       Consults:    IP CONSULT TO HOSPITALIST  IP CONSULT TO HEART FAILURE NURSE/COORDINATOR  IP CONSULT TO DIETITIAN  IP CONSULT TO NEPHROLOGY  IP CONSULT TO CARDIOLOGY  IP CONSULT TO PALLIATIVE CARE  IP CONSULT TO PULMONOLOGY      Assessment/Plan:    Active Hospital Problems    Diagnosis     CAD (coronary artery disease) [I25.10]      Priority: High    CHF (congestive heart failure) ([facility] [I50.9]      Priority: High    Pericardial effusion [I31.39]      Priority: Medium    SOB (shortness of breath) [R06.02]      Priority: Medium    Cardiomyopathy ([facility] [I42.9]      Priority: Medium History of transcatheter aortic valve replacement (TAVR) [Z95.2]      Priority: Medium    Uncontrolled hypertension [I10]      Priority: Medium    Asthma-COPD overlap syndrome ([facility] [J44.9]      Priority: Medium    PAF (paroxysmal atrial fibrillation) (HCC) [I48.0]     ESRD (end stage renal disease) on dialysis ([facility] [N18.6, Z99.2]     Pleural effusion on left [J90]     ZAINAB on CPAP [G47.33, Z99.89]     Obesity (BMI 30-39. 9) [E66.9]        CHF - acute on chronic combined sytolic/diastolic failure w/ reduced EF 20-25% by Echo June 2022. Likely due to hypertensive and ischemic heart disease. Continue current medical management and follow I/O as well as clinical response. W/ known CardioRenal syndrome. Cardiology consulted and appreciated. Nephrology consulted and appreciated. Acute on Chronic Respiratory Failure - w/ hypoxia, POArrival, likely 2nd to above. Baseline home O2 at 3L per NC. Presence of clinical respiratory distress w/ tachypnea/dyspnea/SOB and wheezing w/ use of accessory muscles to breath. Supplemental O2 and wean as tolerated. S/P Thoracentesis Sunday 2 October off anticoagulation - last dose of Plavix AM 27 Sept, w/ 1.5L removed. ESRD - on HD M/W/F. Nephrology consulted and appreciated. Anemia - likely 2nd to CKD, w/out evidence of active bleeding/hemolysis. Stable and asymptomatic w/out indication for transfusion. Follow serial labs. Reviewed and documented as above. HTN/CAD - w/ known CAD but no evidence of active signs/sxs of ischemia/failure. Currently controlled on home meds w/ vitals reviewed and documented as above. Afib - chronic paroxysmal of unspecified and clinically unable to determine etiology. Normally rate controlled on BBlocker - continued. Anticoagulated at baseline on home Eliquis due to secondary hypercoaguable state due to Afib - currently held but anticipate resuming at discharge. Monitored on tele.      Left Pleural effusion - w/ plan for Thoracentesis off anticoagulation - last dose of Plavix AM 27 Sept      DM2 - diet controlled on home oral antiGlycemics/Insulin. Follow FSBS/SSI low regimen. Last HbA1c 7.2% dated this admission. Anticipate resuming/continuing home regimen at discharge. GERD - w/out active signs/sxs of dysphagia/odynophagia. No evidence of active PUD or hx of GI bleed. Controlled on home PPI - continue. Anxiety/Depression - controlled on home medications - continued. Obesity -  With Body mass index is 34.12 kg/m². Complicating assessment and treatment. Placing patient at risk for multiple co-morbidities as well as early death and contributing to the patient's presentation. Counseled on weight loss. ZAINAB - likely 2nd to obesity. Controlled on home CPAP/BiPap - continued, w/ outpatient f/u as previously arranged. DVT Prophylaxis: Eliquis - currently held     Recent Labs     10/03/22  0835          Diet: ADULT DIET; Regular; No Added Salt (3-4 gm); 1200 ml  Code Status: Full Code      PT/OT Eval Status: seen and continuing to assess. Dispo - Patient is medically stable for discharge since Monday 3 October to remain in-house at least until Wed/Thurs 5/6 October pending clinical course, subspecialty recs and placement decision - tentatively [facility].        Lexie Gunderson MD

## 2022-10-05 VITALS
DIASTOLIC BLOOD PRESSURE: 68 MMHG | BODY MASS INDEX: 33.7 KG/M2 | HEIGHT: 69 IN | WEIGHT: 227.51 LBS | RESPIRATION RATE: 20 BRPM | HEART RATE: 53 BPM | TEMPERATURE: 97.8 F | SYSTOLIC BLOOD PRESSURE: 141 MMHG | OXYGEN SATURATION: 98 %

## 2022-10-05 LAB
ALBUMIN SERPL-MCNC: 3.3 G/DL (ref 3.4–5)
ANION GAP SERPL CALCULATED.3IONS-SCNC: 15 MMOL/L (ref 3–16)
BUN BLDV-MCNC: 67 MG/DL (ref 7–20)
CALCIUM SERPL-MCNC: 8.7 MG/DL (ref 8.3–10.6)
CHLORIDE BLD-SCNC: 84 MMOL/L (ref 99–110)
CO2: 25 MMOL/L (ref 21–32)
CREAT SERPL-MCNC: 7 MG/DL (ref 0.9–1.3)
GFR AFRICAN AMERICAN: 10
GFR NON-AFRICAN AMERICAN: 8
GLUCOSE BLD-MCNC: 158 MG/DL (ref 70–99)
GLUCOSE BLD-MCNC: 161 MG/DL (ref 70–99)
GLUCOSE BLD-MCNC: 179 MG/DL (ref 70–99)
HCT VFR BLD CALC: 31.2 % (ref 40.5–52.5)
HEMOGLOBIN: 9.8 G/DL (ref 13.5–17.5)
MAGNESIUM: 1.9 MG/DL (ref 1.8–2.4)
MCH RBC QN AUTO: 27.8 PG (ref 26–34)
MCHC RBC AUTO-ENTMCNC: 31.4 G/DL (ref 31–36)
MCV RBC AUTO: 88.5 FL (ref 80–100)
PDW BLD-RTO: 16.5 % (ref 12.4–15.4)
PERFORMED ON: ABNORMAL
PERFORMED ON: ABNORMAL
PHOSPHORUS: 7 MG/DL (ref 2.5–4.9)
PLATELET # BLD: 253 K/UL (ref 135–450)
PMV BLD AUTO: 7.9 FL (ref 5–10.5)
POTASSIUM SERPL-SCNC: 5.7 MMOL/L (ref 3.5–5.1)
RBC # BLD: 3.53 M/UL (ref 4.2–5.9)
SODIUM BLD-SCNC: 124 MMOL/L (ref 136–145)
WBC # BLD: 8 K/UL (ref 4–11)

## 2022-10-05 PROCEDURE — 2700000000 HC OXYGEN THERAPY PER DAY

## 2022-10-05 PROCEDURE — 94761 N-INVAS EAR/PLS OXIMETRY MLT: CPT

## 2022-10-05 PROCEDURE — 36415 COLL VENOUS BLD VENIPUNCTURE: CPT

## 2022-10-05 PROCEDURE — 6370000000 HC RX 637 (ALT 250 FOR IP): Performed by: INTERNAL MEDICINE

## 2022-10-05 PROCEDURE — 83735 ASSAY OF MAGNESIUM: CPT

## 2022-10-05 PROCEDURE — 6370000000 HC RX 637 (ALT 250 FOR IP)

## 2022-10-05 PROCEDURE — 80069 RENAL FUNCTION PANEL: CPT

## 2022-10-05 PROCEDURE — 85027 COMPLETE CBC AUTOMATED: CPT

## 2022-10-05 PROCEDURE — 90935 HEMODIALYSIS ONE EVALUATION: CPT

## 2022-10-05 PROCEDURE — P9047 ALBUMIN (HUMAN), 25%, 50ML: HCPCS

## 2022-10-05 PROCEDURE — 6360000002 HC RX W HCPCS: Performed by: INTERNAL MEDICINE

## 2022-10-05 PROCEDURE — 6360000002 HC RX W HCPCS

## 2022-10-05 RX ORDER — ALBUMIN (HUMAN) 12.5 G/50ML
25 SOLUTION INTRAVENOUS ONCE
Status: COMPLETED | OUTPATIENT
Start: 2022-10-05 | End: 2022-10-05

## 2022-10-05 RX ORDER — CARVEDILOL 12.5 MG/1
TABLET ORAL
Qty: 60 TABLET | Refills: 10 | OUTPATIENT
Start: 2022-10-05

## 2022-10-05 RX ORDER — ALBUMIN (HUMAN) 12.5 G/50ML
SOLUTION INTRAVENOUS
Status: COMPLETED
Start: 2022-10-05 | End: 2022-10-05

## 2022-10-05 RX ORDER — MIDODRINE HYDROCHLORIDE 5 MG/1
TABLET ORAL
Status: COMPLETED
Start: 2022-10-05 | End: 2022-10-05

## 2022-10-05 RX ORDER — MIDODRINE HYDROCHLORIDE 5 MG/1
5 TABLET ORAL DAILY PRN
Status: DISCONTINUED | OUTPATIENT
Start: 2022-10-05 | End: 2022-10-05 | Stop reason: HOSPADM

## 2022-10-05 RX ORDER — CITALOPRAM 40 MG/1
TABLET ORAL
Qty: 30 TABLET | Refills: 10 | OUTPATIENT
Start: 2022-10-05

## 2022-10-05 RX ADMIN — PRAVASTATIN SODIUM 40 MG: 40 TABLET ORAL at 09:34

## 2022-10-05 RX ADMIN — OXYCODONE AND ACETAMINOPHEN 1 TABLET: 7.5; 325 TABLET ORAL at 09:38

## 2022-10-05 RX ADMIN — CLOPIDOGREL BISULFATE 75 MG: 75 TABLET ORAL at 09:37

## 2022-10-05 RX ADMIN — NEPHROCAP 1 MG: 1 CAP ORAL at 09:34

## 2022-10-05 RX ADMIN — ALBUMIN (HUMAN) 25 G: 0.25 INJECTION, SOLUTION INTRAVENOUS at 13:02

## 2022-10-05 RX ADMIN — SACUBITRIL AND VALSARTAN 0.5 TABLET: 24; 26 TABLET, FILM COATED ORAL at 09:47

## 2022-10-05 RX ADMIN — MIDODRINE HYDROCHLORIDE 5 MG: 5 TABLET ORAL at 14:33

## 2022-10-05 RX ADMIN — MIDODRINE HYDROCHLORIDE 5 MG: 5 TABLET ORAL at 13:04

## 2022-10-05 RX ADMIN — APIXABAN 5 MG: 5 TABLET, FILM COATED ORAL at 09:34

## 2022-10-05 RX ADMIN — ISOSORBIDE DINITRATE 20 MG: 20 TABLET ORAL at 10:23

## 2022-10-05 RX ADMIN — ALBUMIN (HUMAN) 25 G: 12.5 SOLUTION INTRAVENOUS at 13:02

## 2022-10-05 RX ADMIN — DULOXETINE HYDROCHLORIDE 30 MG: 30 CAPSULE, DELAYED RELEASE ORAL at 09:37

## 2022-10-05 RX ADMIN — HEPARIN SODIUM 3600 UNITS: 1000 INJECTION INTRAVENOUS; SUBCUTANEOUS at 15:05

## 2022-10-05 RX ADMIN — EPOETIN ALFA-EPBX 5400 UNITS: 10000 INJECTION, SOLUTION INTRAVENOUS; SUBCUTANEOUS at 15:55

## 2022-10-05 ASSESSMENT — PAIN DESCRIPTION - LOCATION: LOCATION: BACK;HIP

## 2022-10-05 ASSESSMENT — PAIN DESCRIPTION - PAIN TYPE: TYPE: CHRONIC PAIN

## 2022-10-05 ASSESSMENT — PAIN DESCRIPTION - DESCRIPTORS: DESCRIPTORS: THROBBING

## 2022-10-05 ASSESSMENT — PAIN DESCRIPTION - ORIENTATION: ORIENTATION: RIGHT;LEFT;LOWER

## 2022-10-05 ASSESSMENT — PAIN SCALES - GENERAL: PAINLEVEL_OUTOF10: 8

## 2022-10-05 ASSESSMENT — PAIN - FUNCTIONAL ASSESSMENT: PAIN_FUNCTIONAL_ASSESSMENT: ACTIVITIES ARE NOT PREVENTED

## 2022-10-05 NOTE — PROGRESS NOTES
[facility] Progress Note           Subjective/   45y.o. year old male who we are seeing in consultation for ESKD on HD. HPI:  Readmitted with fluid overload    ROS:  SOB stable on O2  No CP   DW~103.5  Seen during HD today    Objective/   GEN:  Chronically ill, BP (!) 176/97   Pulse 59   Temp 97.4 °F (36.3 °C) (Oral)   Resp 15   Ht 5' 9\" (1.753 m)   Wt 242 lb 15.2 oz (110.2 kg)   SpO2 96%   BMI 35.88 kg/m²   HEENT: non-icteric, no JVD  CV: S1, S2 without m/r/g; no LE edema  RESP: CTA B w/o w/r/r, decreased on left; breathing wnl  ABD: +bs, soft, nt, no hsm  SKIN: warm, no rashes  ACCESS: Left IJ [facility]    Data/  Recent Labs     10/03/22  0835 10/04/22  1425   WBC 8.7 6.9   HGB 10.8* 10.5*   HCT 33.9* 34.6*   MCV 88.3 93.7    224       Recent Labs     10/03/22  0835 10/04/22  1425   * 127*   K 5.2* 6.0*   CL 85* 88*   CO2 24 25   GLUCOSE 125* 206*   PHOS 6.1* 5.7*   MG  --  1.80   BUN 61* 50*   CREATININE 6.2* 6.2*   LABGLOM 9* 9*   GFRAA 11* 11*         Assessment/Plan     # End stage kidney disease - on HD Mon-Wed-Fri   FN=186.5 kg; noncompliant with fluid restriction; FR at 1200 for now    # NSTEMI    #Hypertension    #s/p TAVR    # Left pleural effusion - increased in size from CT chest on 9/28  may need thoracentesis once off plavix for 5 days, defer to Pulmonary  # COPD/ZAINAB - requiring bi-level    #Anemia of chronic disease - DAVON with HD    # Hyperkalemia - better with HD, reduction in entresto dosing          ____________________________________  Katya Arana MD  [facility]  [email or web address]  Office: [phone]

## 2022-10-05 NOTE — PROGRESS NOTES
Physical Therapy    PT session attempted this morning, although pt on his phone and refusing. \"I'm busy right now, maybe later. \"  Will re-attempt at a later time/date, as schedule permits. Thank you.     Sinan Gray  PT, DPT #[id number]

## 2022-10-05 NOTE — PROGRESS NOTES
10/04/22 2031   NIV Type   NIV Started/Stopped On   Equipment Type V60   Mode Bilevel   Mask Type Full face mask   Mask Size Medium   Settings/Measurements   IPAP 16 cmH20   CPAP/EPAP 6 cmH2O   Vt (Measured) 688 mL   Rate Ordered 14   Resp 18   FiO2  30 %   I Time/ I Time % 1 s   Minute Volume (L/min) 8.3 Liters   Mask Leak (lpm) 35 lpm   Comfort Level Good   Using Accessory Muscles No   SpO2 98   Patient's Home Machine No   Alarm Settings   Alarms On Y   Low Pressure (cmH2O) 6 cmH2O   High Pressure (cmH2O) 30 cmH2O   RR Low (bpm) 6   RR High (bpm) 40 br/min

## 2022-10-05 NOTE — PROGRESS NOTES
10/05/22 0027   NIV Type   Equipment Type v60   Mode Bilevel   Mask Type Full face mask   Settings/Measurements   PIP Observed 17 cm H20   IPAP 16 cmH20   CPAP/EPAP 8 cmH2O   Vt (Measured) 535 mL   Rate Ordered 14   Resp 18   Insp Rise Time (%) 1 %   FiO2  30 %   I Time/ I Time % 1 s   Minute Volume (L/min) 8.8 Liters   Mask Leak (lpm) 46 lpm  (pt has beard)   Comfort Level Good   Using Accessory Muscles No   SpO2 96   Patient's Home Machine No   Alarm Settings   Alarms On Y   Low Pressure (cmH2O) 6 cmH2O   High Pressure (cmH2O) 30 cmH2O   Delay Alarm 20 sec(s)   RR Low (bpm) 6   RR High (bpm) 40 br/min

## 2022-10-05 NOTE — PROGRESS NOTES
10/05/22 0809   NIV Type   Equipment Type v60   Mode Bilevel   Mask Type Full face mask   Mask Size Medium   Settings/Measurements   PIP Observed 16 cm H20   IPAP 16 cmH20   CPAP/EPAP 8 cmH2O   Vt (Measured) 944 mL   Rate Ordered 14   Resp 15   Insp Rise Time (%) 1 %   FiO2  30 %   I Time/ I Time % 1 s   Minute Volume (L/min) 15.1 Liters   Comfort Level Good   Using Accessory Muscles No   SpO2 97   Patient's Home Machine No   Breath Sounds   Right Upper Lobe Diminished   Right Middle Lobe Diminished   Right Lower Lobe Diminished   Left Upper Lobe Diminished   Left Lower Lobe Diminished   Alarm Settings   Alarms On Y   Low Pressure (cmH2O) 6 cmH2O   High Pressure (cmH2O) 30 cmH2O   Delay Alarm 20 sec(s)   RR Low (bpm) 6   RR High (bpm) 40 br/min   Oxygen Therapy/Pulse Ox   O2 Therapy Oxygen   $Oxygen $Daily Charge   O2 Device PAP (positive airway pressure)   $Pulse Oximeter $Spot check (multiple/continuous)

## 2022-10-05 NOTE — PROGRESS NOTES
Hospitalist Progress Note      PCP: John Navarro MD    Date of Admission: 9/23/2022    Chief Complaint: SOB    Subjective: no new c/o s/p thoracentesis 2 October. Medications:  Reviewed    Infusion Medications    sodium chloride      dextrose       Scheduled Medications    melatonin  10 mg Oral Nightly    sacubitril-valsartan  0.5 tablet Oral BID    epoetin siddharth-epbx  5,400 Units IntraVENous Q MWF    insulin lispro  0-4 Units SubCUTAneous TID WC    insulin lispro  0-4 Units SubCUTAneous Nightly    apixaban  5 mg Oral BID    clopidogrel  75 mg Oral Daily    DULoxetine  30 mg Oral Daily    linaclotide  145 mcg Oral QAM AC    isosorbide dinitrate  20 mg Oral TID    pantoprazole  40 mg Oral QAM AC    pravastatin  40 mg Oral Daily    tiotropium-olodaterol  2 puff Inhalation Daily    QUEtiapine  50 mg Oral Nightly    metoprolol succinate  100 mg Oral BID    docusate sodium  100 mg Oral Daily    b complex-C-folic acid  1 capsule Oral Daily    sodium chloride flush  10 mL IntraVENous 2 times per day     PRN Meds: midodrine, calcium carbonate, perflutren lipid microspheres, oxyCODONE-acetaminophen, heparin (porcine), albuterol sulfate HFA, cyclobenzaprine, ipratropium-albuterol, sodium chloride flush, sodium chloride, promethazine, acetaminophen **OR** acetaminophen, glucose, dextrose bolus **OR** dextrose bolus, glucagon (rDNA), dextrose    No intake or output data in the 24 hours ending 10/05/22 1604      Physical Exam Performed:    BP (!) 143/79   Pulse 56   Temp 97.8 °F (36.6 °C)   Resp 18   Ht 5' 9\" (1.753 m)   Wt 238 lb 5.1 oz (108.1 kg)   SpO2 97%   BMI 35.19 kg/m²     General appearance: No apparent distress, appears stated age and cooperative. HEENT: Pupils equal, round, and reactive to light. Conjunctivae/corneas clear. Neck: Supple, with full range of motion. No jugular venous distention. Trachea midline. Respiratory:  Normal respiratory effort.  Clear to auscultation, bilaterally without Rales/Wheezes/Rhonchi. Cardiovascular: Regular rate and rhythm with normal S1/S2 without murmurs, rubs or gallops. Abdomen: Soft, non-tender, non-distended with normal bowel sounds. Musculoskeletal: No clubbing, cyanosis or edema bilaterally. Full range of motion without deformity. Skin: Skin color, texture, turgor normal.  No rashes or lesions. Neurologic:  Neurovascularly intact without any focal sensory/motor deficits. Cranial nerves: II-XII intact, grossly non-focal.  Psychiatric: Alert and oriented, thought content appropriate, normal insight  Capillary Refill: Brisk,< 3 seconds   Peripheral Pulses: +2 palpable, equal bilaterally       Labs:   Recent Labs     10/03/22  0835 10/04/22  1425 10/05/22  1153   WBC 8.7 6.9 8.0   HGB 10.8* 10.5* 9.8*   HCT 33.9* 34.6* 31.2*    224 253     Recent Labs     10/03/22  0835 10/04/22  1425 10/05/22  1153   * 127* 124*   K 5.2* 6.0* 5.7*   CL 85* 88* 84*   CO2 24 25 25   BUN 61* 50* 67*   CREATININE 6.2* 6.2* 7.0*   CALCIUM 8.8 8.4 8.7   PHOS 6.1* 5.7* 7.0*     No results for input(s): AST, ALT, BILIDIR, BILITOT, ALKPHOS in the last 72 hours. No results for input(s): INR in the last 72 hours. No results for input(s): Kaylanan Rafal in the last 72 hours.       Urinalysis:      Lab Results   Component Value Date/Time    NITRU Negative 05/29/2022 12:51 PM    WBCUA 10-20 05/29/2022 12:51 PM    BACTERIA 3+ 05/29/2022 12:51 PM    RBCUA 5-10 05/29/2022 12:51 PM    BLOODU TRACE-INTACT 05/29/2022 12:51 PM    SPECGRAV 1.015 05/29/2022 12:51 PM    GLUCOSEU 100 05/29/2022 12:51 PM       Consults:    IP CONSULT TO HOSPITALIST  IP CONSULT TO HEART FAILURE NURSE/COORDINATOR  IP CONSULT TO DIETITIAN  IP CONSULT TO NEPHROLOGY  IP CONSULT TO CARDIOLOGY  IP CONSULT TO PALLIATIVE CARE  IP CONSULT TO PULMONOLOGY  IP CONSULT TO HOME CARE NEEDS      Assessment/Plan:    Active Hospital Problems    Diagnosis     CAD (coronary artery disease) [I25.10]      Priority: High CHF (congestive heart failure) ([facility] [I50.9]      Priority: High    Pericardial effusion [I31.39]      Priority: Medium    SOB (shortness of breath) [R06.02]      Priority: Medium    Cardiomyopathy ([facility] [I42.9]      Priority: Medium    History of transcatheter aortic valve replacement (TAVR) [Z95.2]      Priority: Medium    Uncontrolled hypertension [I10]      Priority: Medium    Asthma-COPD overlap syndrome ([facility] [J44.9]      Priority: Medium    PAF (paroxysmal atrial fibrillation) (HCC) [I48.0]     ESRD (end stage renal disease) on dialysis ([facility] [N18.6, Z99.2]     Pleural effusion on left [J90]     ZAINAB on CPAP [G47.33, Z99.89]     Obesity (BMI 30-39. 9) [E66.9]        CHF - acute on chronic combined sytolic/diastolic failure w/ reduced EF 20-25% by Echo June 2022. Likely due to hypertensive and ischemic heart disease. Continue current medical management and follow I/O as well as clinical response. W/ known CardioRenal syndrome. Cardiology consulted and appreciated. Nephrology consulted and appreciated. Acute on Chronic Respiratory Failure - w/ hypoxia, POArrival, likely 2nd to above. Baseline home O2 at 3L per NC. Presence of clinical respiratory distress w/ tachypnea/dyspnea/SOB and wheezing w/ use of accessory muscles to breath. Supplemental O2 and wean as tolerated. S/P Thoracentesis Sunday 2 October off anticoagulation - last dose of Plavix AM 27 Sept, w/ 1.5L removed. ESRD - on HD M/W/F. Nephrology consulted and appreciated. Anemia - likely 2nd to CKD, w/out evidence of active bleeding/hemolysis. Stable and asymptomatic w/out indication for transfusion. Follow serial labs. Reviewed and documented as above. HTN/CAD - w/ known CAD but no evidence of active signs/sxs of ischemia/failure. Currently controlled on home meds w/ vitals reviewed and documented as above.  Anticipate resuming Plavix at discharge    Afib - chronic paroxysmal of unspecified and clinically unable to determine etiology. Normally rate controlled on BBlocker - continued. Anticoagulated at baseline on home Eliquis due to secondary hypercoaguable state due to Afib - currently held but anticipate resuming at discharge. Monitored on tele. Left Pleural effusion - s/p Thoracentesis as above off anticoagulation - last dose of Plavix AM 27 Sept      DM2 - diet controlled on home oral antiGlycemics/Insulin. Follow FSBS/SSI low regimen. Last HbA1c 7.2% dated this admission. Anticipate resuming/continuing home regimen at discharge. GERD - w/out active signs/sxs of dysphagia/odynophagia. No evidence of active PUD or hx of GI bleed. Controlled on home PPI - continue. Anxiety/Depression - controlled on home medications - continued. Obesity -  With Body mass index is 35.19 kg/m². Complicating assessment and treatment. Placing patient at risk for multiple co-morbidities as well as early death and contributing to the patient's presentation. Counseled on weight loss. ZAINAB - likely 2nd to obesity. Controlled on home CPAP/BiPap - continued, w/ outpatient f/u as previously arranged. DVT Prophylaxis: Eliquis - currently held     Recent Labs     10/03/22  0835 10/04/22  1425 10/05/22  1153    224 253     Diet: ADULT DIET; Regular; No Added Salt (3-4 gm); 1200 ml  Code Status: Full Code      PT/OT Eval Status: seen and continuing to assess. Dispo - Patient is medically stable for discharge since Monday 3 October to remain in-house at least until Wed/Thurs 5/6 October pending clinical course, subspecialty recs and placement decision - tentatively [facility].        Afshan Chavez MD

## 2022-10-05 NOTE — PROGRESS NOTES
10/05/22 0352   NIV Type   Equipment Type v60   Mode Bilevel   Mask Type Full face mask   Settings/Measurements   PIP Observed 18 cm H20   IPAP 16 cmH20   CPAP/EPAP 8 cmH2O   Vt (Measured) 435 mL   Rate Ordered 14   Resp 25   Insp Rise Time (%) 1 %   FiO2  30 %   I Time/ I Time % 1 s   Minute Volume (L/min) 10.9 Liters   Comfort Level Good   Using Accessory Muscles No   Patient's Home Machine No   Alarm Settings   Alarms On Y   Low Pressure (cmH2O) 6 cmH2O   High Pressure (cmH2O) 30 cmH2O   Delay Alarm 20 sec(s)   RR Low (bpm) 6   RR High (bpm) 40 br/min

## 2022-10-05 NOTE — PROGRESS NOTES
Patient educated on the importance of safe ambulation, and utilizing available resources to prevent mechanical falls. In spite of education, patient continues to refuse bed alarm in order to remain independent.

## 2022-10-05 NOTE — WOUND CARE
[facility] Wound Ostomy Continence Nurse  Consult Note       NAME:  Denae Raphael RECORD NUMBER:  [id number]  AGE: 45 y.o. GENDER: male  : 1970  TODAY'S DATE:  10/5/2022    Subjective I hit it on a wall @ 6 months ago. Reports he does not follow a podiatrist.   Reason for WOCN Evaluation and Assessment:       Lawana Collet is a 45 y.o. male referred by:   [] Physician  [x] Nursing  [] Other:     Wound Identification:  Wound Type:  Traumatic right great toe  Contributing Factors: edema, diabetes, and decreased mobility    Wound History:   Lawana Collet is a 45 y.o. male with a history of end-stage renal disease on hemodialysis, COPD, asthma, CHF, CAD, HTN, PAF, DM, AVR, CVA who presents to the ED complaining of shortness of breath. Patient brought in by EMS from dialysis. They state he only received 45 minutes of treatment before complaining of severe dyspnea and was observed to be tachypneic. Brought in on CPAP and received a breathing treatment during transport. Patient states he has been dyspneic for the last 3 days. Reports a nonproductive cough which is new, generalized chest tightness, nausea and several episodes of emesis yesterday but none today. Denies fever but states he has had occasional chills. Unaware of sick contacts. Vaccinated for COVID-19. Current Wound Care Treatment:  open to air.     Patient Goal of Care:  [x] Wound Healing  [] Odor Control  [] Palliative Care  [] Pain Control   [] Other:         PAST MEDICAL HISTORY        Diagnosis Date    Ambulatory dysfunction     walker for long distances, SOB with distance    Aortic stenosis     echo 2017    Arthritis     hands and hips    Asthma     Bilateral hilar adenopathy syndrome 6/3/2013    CAD (coronary artery disease)     Dr. Reilly [facility]) 2019    EF= 43%    CHF (congestive heart failure) ([facility])     Chronic pain     COPD (chronic obstructive pulmonary disease) ([facility] pulmonology Dr. Yvonne Finnegan    Depression     Diabetes mellitus ([facility]     borderline    Difficult intravenous access     Emphysema of lung ([facility]     ESRD (end stage renal disease) on dialysis ([facility]     MWF    Fear of needles     Gastric ulcer     GERD (gastroesophageal reflux disease)     Heart valve problem     bicuspic valve    Hemodialysis patient ([facility]     History of spinal fracture     work incident    Hx of blood clots     Bilateral lower extremities; stents in place    Hyperlipidemia     Hypertension     MI (myocardial infarction) ([facility] 2019    has had 9 MIs. 2019 was the last    Neuromuscular disorder ([facility]     due to CVA    Numbness and tingling in left arm     from fistula    Pneumonia     PONV (postoperative nausea and vomiting)     Prolonged emergence from general anesthesia     States requires more medication than most people    Sleep apnea     Uses CPAP    Stroke [facility]     7mm thalamic cva 2017 deficts left side, left side weakness    TIA (transient ischemic attack)     Unspecified diseases of blood and blood-forming organs        PAST SURGICAL HISTORY    Past Surgical History:   Procedure Laterality Date    AORTIC VALVE REPLACEMENT N/A 10/15/2019    TRANSCATHETER AORTIC VALVE REPLACEMENT FEMORAL APPROACH performed by Daniele Bar MD at [address] [facility] Right 7/2/2019    PERITONEAL DIALYSIS CATHETER REMOVAL performed by Anel Rodgers MD at [address] [place] [address]  2/29/2015    [facility]    CORONARY ANGIOPLASTY WITH STENT PLACEMENT  05/26/15    CYST REMOVAL  08/14/2013    EXCISION CYSTS, NECK X2 AND ABDOMINAL benign    DIAGNOSTIC CARDIAC CATH LAB PROCEDURE      DIALYSIS FISTULA CREATION Left 10/30/2017    LEFT BRACHIAL CEPHALIC FISTULA    DIALYSIS FISTULA CREATION Left 3/27/2019    LIGATION  AV FISTULA performed by Nicki Brumfield MD at [address] [facility] COLON, DIAGNOSTIC      IR TUNNELED CATHETER PLACEMENT GREATER THAN 5 YEARS  3/21/2022    IR TUNNELED CATHETER PLACEMENT GREATER THAN 5 YEARS 3/21/2022 [facility]    IR TUNNELED CATHETER PLACEMENT GREATER THAN 5 YEARS  4/21/2022    IR TUNNELED CATHETER PLACEMENT GREATER THAN 5 YEARS 4/21/2022 [facility]    IR TUNNELED CATHETER PLACEMENT GREATER THAN 5 YEARS  4/26/2022    IR TUNNELED CATHETER PLACEMENT GREATER THAN 5 YEARS 4/26/2022 [facility]    IR TUNNELED CATHETER PLACEMENT GREATER THAN 5 YEARS  6/23/2022    IR TUNNELED CATHETER PLACEMENT GREATER THAN 5 YEARS 6/23/2022 [facility]    OTHER SURGICAL HISTORY  02/01/2017    laparoscopic cholecystectomy with intraoperative cholangiogram    OTHER SURGICAL HISTORY  2018    PORT PLACEMENT  - vas cath    OTHER SURGICAL HISTORY Bilateral 06/26/2018    laprascopic peritoneal dialysis catheter placement    OTHER SURGICAL HISTORY Right 09/2018    peritoneal dialysis port placed on right side of abdomen    OTHER SURGICAL HISTORY  05/28/2019    PTA/Stenting R External Iliac artery    MO LAP INSERTION TUNNELED INTRAPERITONEAL CATHETER N/A 9/21/2018    LAPAROSCOPIC PERITONEAL DIALYSIS CATHETER REPLACEMENT performed by Liam Salazar MD at [address] [facility] 2/24/2022    PERINEAL ABCESS DRAINAGE performed by Liam Salazar MD at [address] [place] [address] N/A 10/2/2022    THORACENTESIS ULTRASOUND performed by Florentino Aranda MD at [address]  01/06/2016    UPPER GASTROINTESTINAL ENDOSCOPY  01/29/2017    possible candida, otherwise normal appearing    VASCULAR SURGERY  aprx 2 years ago    2 stents placed, each side of groin       FAMILY HISTORY    Family History   Problem Relation Age of Onset    Diabetes Mother     Heart Disease Father     Kidney Disease Sister         stage 4-kidney failure    Cancer Sister     Heart Disease Sister     Obesity Sister     Cancer Sister     Heart Disease Sister     Obesity Sister     Alcohol Abuse Brother SOCIAL HISTORY    Social History     Tobacco Use    Smoking status: Former     Packs/day: 0.50     Years: 33.00     Pack years: 16.50     Types: Cigarettes     Quit date: 2020     Years since quittin.4    Smokeless tobacco: Never    Tobacco comments:     [facility] quit 2021   Vaping Use    Vaping Use: Never used   Substance Use Topics    Alcohol use: Not Currently     Alcohol/week: 0.0 standard drinks     Comment: occ    Drug use: No       ALLERGIES    Allergies   Allergen Reactions    Morphine Nausea And Vomiting       MEDICATIONS    No current facility-administered medications on file prior to encounter. Current Outpatient Medications on File Prior to Encounter   Medication Sig Dispense Refill    oxyCODONE-acetaminophen (PERCOCET) 7.5-325 MG per tablet Take 1 tablet by mouth every 6 hours as needed for Pain.      metoprolol succinate (TOPROL XL) 100 MG extended release tablet Take 1 tablet by mouth in the morning and at bedtime 30 tablet 3    sacubitril-valsartan (ENTRESTO) 24-26 MG per tablet Take 2 tablets by mouth in the morning and 2 tablets before bedtime.  60 tablet     cyclobenzaprine (FLEXERIL) 5 MG tablet Muscle spasms      traZODone (DESYREL) 100 MG tablet Take 100 mg by mouth nightly      QUEtiapine (SEROQUEL) 50 MG tablet TAKE 1 TABLET BY MOUTH EVERY EVENING 30 tablet 10    isosorbide dinitrate (ISORDIL) 20 MG tablet Take 1 tablet by mouth 3 times daily 90 tablet 3    clopidogrel (PLAVIX) 75 MG tablet Take 1 tablet by mouth daily 90 tablet 3    pantoprazole (PROTONIX) 40 MG tablet TAKE 1 TABLET BY MOUTH EVERY MORNING BEFORE BREAKFAST 30 tablet 10    docusate sodium (DOK) 100 MG capsule Take 1 capsule by mouth daily 30 capsule 5    LINZESS 145 MCG capsule TAKE 1 CAPSULE BY MOUTH IN THE MORNING BEFORE BREAKFAST 30 capsule 10    DULoxetine (CYMBALTA) 30 MG extended release capsule TAKE 1 CAPSULE BY MOUTH EVERY DAY 30 capsule 10    mineral oil liquid Take 30 mLs by mouth daily as needed for Constipation 300 mL 0    sennosides-docusate sodium (SENOKOT-S) 8.6-50 MG tablet Take 1 tablet by mouth daily 10 tablet 0    apixaban (ELIQUIS) 5 MG TABS tablet Take 1 tablet by mouth 2 times daily 60 tablet 1    pravastatin (PRAVACHOL) 40 MG tablet Take 1 tablet by mouth daily 90 tablet 3    Continuous Blood Gluc Sensor (DEXCOM G6 SENSOR) MISC Every 10 days 9 each 3    Continuous Blood Gluc Transmit (DEXCOM G6 TRANSMITTER) MISC 1 each by Does not apply route every 3 months 1 each 3    Continuous Blood Gluc  (DEXCOM G6 ) ADAM 1 each by Does not apply route Daily with lunch 1 each 0    B Complex-C-Folic Acid (VIRT-CAPS) 1 MG CAPS TAKE 1 CAPSULE BY MOUTH EVERY DAY 90 capsule 1    Calcium Acetate, Phos Binder, 667 MG CAPS TAKE 1 CAPSULE BY MOUTH THREE TIMES DAILY WITH MEALS 90 capsule 3    nitroGLYCERIN (NITROSTAT) 0.4 MG SL tablet DISSOLVE 1 TABLET UNDER THE TONGUE AS NEEDED FOR CHEST PAIN EVERY 5 MINUTES UP TO 3 TIMES. IF NO RELIEF CALL 911. 25 tablet 10    vitamin D (ERGOCALCIFEROL) 91377 units CAPS capsule TK 1 C PO WEEKLY  11    Tiotropium Bromide-Olodaterol (STIOLTO RESPIMAT) 2.5-2.5 MCG/ACT AERS Inhale 2 puffs into the lungs daily 2 Inhaler 0    Blood Glucose Monitoring Suppl ADAM USE AS DIRECTED. 1 Device 0    Alcohol Swabs PADS USE AS DIRECTED 300 each 3    albuterol sulfate  (90 Base) MCG/ACT inhaler Inhale 2 puffs into the lungs every 6 hours as needed for Wheezing 1 Inhaler 3    ipratropium-albuterol (DUONEB) 0.5-2.5 (3) MG/3ML SOLN nebulizer solution Inhale 3 mLs into the lungs every 6 hours as needed for Shortness of Breath 360 mL 1    calcium carbonate (TUMS) 500 MG chewable tablet Take 1 tablet by mouth 3 times daily as needed for Heartburn. Objectivelying in bed.     BP (!) 167/80   Pulse 54   Temp 97.6 °F (36.4 °C) (Oral)   Resp 20   Ht 5' 9\" (1.753 m)   Wt 242 lb 15.2 oz (110.2 kg)   SpO2 97%   BMI 35.88 kg/m²     LABS:  WBC:    Lab Results   Component Value Date/Time    WBC 6.9 10/04/2022 02:25 PM     H/H:    Lab Results   Component Value Date/Time    HGB 10.5 10/04/2022 02:25 PM    HCT 34.6 10/04/2022 02:25 PM     PTT:    Lab Results   Component Value Date/Time    APTT 33.1 07/17/2022 03:10 AM   [APTT}  PT/INR:    Lab Results   Component Value Date/Time    PROTIME 16.7 07/17/2022 03:10 AM    INR 1.37 07/17/2022 03:10 AM     HgBA1c:    Lab Results   Component Value Date/Time    LABA1C 7.2 09/24/2022 04:48 AM       Assessment  Open red and white wound bed. 1/2 of nail I off great toe.  See photo   Isaac Risk Score: Isaac Scale Score: 19    Patient Active Problem List   Diagnosis    Sepsis without acute organ dysfunction ([facility])    CHF (congestive heart failure) ([facility])    Asthma-COPD overlap syndrome ([facility])    CAD (coronary artery disease)    PVD (peripheral vascular disease) ([facility])    Bicuspid aortic valve    Bilateral hilar adenopathy syndrome    Claudication in peripheral vascular disease ([facility])    Uncontrolled hypertension    Diabetic neuropathy ([facility])    Type 2 diabetes, uncontrolled, with neuropathy    Passive smoke exposure    Depression with anxiety    PNA (pneumonia)    Obesity (BMI 30-39.9)    ZAINAB on CPAP    Degeneration of lumbar or lumbosacral intervertebral disc    Lumbar radiculopathy    Lumbosacral spondylosis without myelopathy    Biliary colic    Symptomatic cholelithiasis    Gastroparesis due to DM ([facility])    Angina, class IV ([facility])    Dyspnea    Dyslipidemia    Acute on chronic combined systolic and diastolic heart failure ([facility])    Ischemic cardiomyopathy    Tobacco abuse    CVA (cerebral vascular accident) ([facility]    History of CVA (cerebrovascular accident)    Type 2 diabetes mellitus without complication, without long-term current use of insulin ([facility])    ZAINAB (obstructive sleep apnea)    Pleural effusion on left    Chronic anemia    Nonrheumatic aortic valve stenosis    Mucus plugging of bronchi    Hemodialysis-associated hypotension    ESRD (end stage renal disease) on dialysis ([facility]    Hypotension due to drugs    Acute diastolic CHF (congestive heart failure) ([facility])    Neuromuscular disorder ([facility])    Renovascular hypertension    Mixed hyperlipidemia    Cigarette nicotine dependence in remission    Pulmonary edema    Fluid overload    Anemia of chronic disease    SOB (shortness of breath) on exertion    Steal syndrome of dialysis vascular access ([facility])    Chronic, continuous use of opioids    Chronic bronchitis ([facility])    Nasal congestion    Hypercholesteremia    Bradycardia    History of transcatheter aortic valve replacement (TAVR)    Syncope and collapse    PAF (paroxysmal atrial fibrillation) ([facility])    Bilateral leg weakness    GBS (Guillain-Bristol syndrome) ([facility])    Sinus pause    Weakness of both lower extremities    Sinus bradycardia    Ataxia    Peripheral vascular occlusive disease ([facility])    Cellulitis of right foot    Iliac artery occlusion, right ([facility])    Cellulitis and abscess of hand    Type 2 diabetes mellitus with hyperglycemia ([facility])    Acute encephalopathy    Acute hypoxemic respiratory failure ([facility])    Acute CVA (cerebrovascular accident) ([facility]    Speech problem    Urinary tract infection with hematuria    Respiratory failure with hypoxia ([facility]    Acute respiratory failure with hypercapnia ([facility])    Acute pulmonary edema ([facility])    Grade II diastolic dysfunction    Shock circulatory ([facility])    Smoker    Normocytic normochromic anemia    NSTEMI (non-ST elevated myocardial infarction) ([facility])    SIRS (systemic inflammatory response syndrome) ([facility])    Atrial flutter ([facility])    Liberty-rectal abscess    Somnolence    Pulmonary edema with diastolic CHF, NYHA class 3 ([facility])    Respiratory failure ([facility])    Former smoker    Cardiomyopathy ([facility]    Morbid obesity with BMI of 40.0-44.9, adult ([facility]    Hypoglycemia    Altered mental status    Hypertensive emergency    SOB (shortness of breath)    Acute respiratory failure with hypoxia and hypercapnia ([facility])    HFrEF (heart failure with reduced ejection fraction) ([facility])    Pericardial effusion       Measurements:  Wound 08/30/21 Toe (Comment  which one) Right Great Toe (Active)   Wound Image    10/05/22 1040   Wound Etiology Traumatic 10/05/22 1040   Dressing Status New dressing applied 10/05/22 1040   Wound Cleansed Irrigated with saline 10/05/22 1040   Dressing/Treatment Dry dressing;Roll gauze 10/05/22 1040   Dressing Change Due 10/06/22 10/05/22 1040   Wound Length (cm) 3.5 cm 10/05/22 1040   Wound Width (cm) 1.5 cm 10/05/22 1040   Wound Depth (cm) 0.1 cm 10/05/22 1040   Wound Surface Area (cm^2) 5.25 cm^2 10/05/22 1040   Change in Wound Size % (l*w) -3400 10/05/22 1040   Wound Volume (cm^3) 0.525 cm^3 10/05/22 1040   Wound Healing % -3400 10/05/22 1040   Distance Tunneling (cm) 0 cm 10/05/22 1040   Tunneling Position ___ O'Clock 0 10/05/22 1040   Undermining Starts ___ O'Clock 0 10/05/22 1040   Undermining Ends___ O'Clock 0 10/05/22 1040   Undermining Maxium Distance (cm) 0 10/05/22 1040   Wound Assessment Pink/red; Other (Comment) 10/05/22 1040   Drainage Amount Moderate 10/05/22 1040   Drainage Description Serosanguinous; Sanguinous 10/05/22 1040   Odor None 10/05/22 1040   Liberty-wound Assessment Edematous;Fragile; Other (Comment) 10/05/22 1040   Margins Defined edges; Attached edges 10/05/22 1040   Wound Thickness Description not for Pressure Injury Full thickness 10/05/22 1040   Number of days: 400      Right great hallux:            Response to treatment:  Well tolerated by patient. Pain Assessment:  Severity:  0 / 10  Quality of pain: N/A  Wound Pain Timing/Severity: none  Premedicated: No    Plan   Plan of Care: Wound 08/30/21 Toe (Comment  which one) Right Great Toe-Dressing/Treatment: Dry dressing, Roll gauze    Dr James Billy notified of wound on great toe and treatment with alginage AG. Recommend:  Clean right great toe with normal saline. Apply alginate ag, 2x2 dressing and wrap with roll guaze.   Follow up with podiatry or wound care at discharge. Wound care to follow. Call wound care for deterioration [phone] or call [phone] and leave message. Specialty Bed Required : No   [] Low Air Loss   [] Pressure Redistribution  [] Fluid Immersion  [] Bariatric  [] Total Pressure Relief  [] Other:     Current Diet: ADULT DIET; Regular; No Added Salt (3-4 gm); 1200 ml  Dietician consult:  Yes    Discharge Plan:  Placement for patient upon discharge: [facility]. Patient appropriate for Outpatient [address]: Yes    Referrals:  []  / discharge planner following. [] [address]  [] Supplies  [] Other    Patient/Caregiver Teaching: Instructed on treatment and follow up post discharge.   Level of patient/caregiver understanding able to:   [] Indicates understanding       [x] Needs reinforcement  [] Unsuccessful      [x] Verbal Understanding  [] Demonstrated understanding       [] No evidence of learning  [] Refused teaching         [] N/A       Electronically signed by Wing Peraza RN, MSN, Syd Patel on 10/5/2022 at 12:53 PM

## 2022-10-05 NOTE — CARE COORDINATION
Chart reviewed day 12. Patient has medicaid precert pending and is expected to come back today from [place]. Writer spoke with [address] [facility] and she is good with him coming today when cert is returned. Transport set for [phone] with [facility] spoke with [facility]. Will continue to follow. Spoke with [facility] at [address] cancelled transport, patient is going home with spouse. Spouse will pick him up at 1800, and he will go to his dialysis on Friday.      Dain Schulz RN

## 2022-10-05 NOTE — CARE COORDINATION
DC order noted patient going home now, not going to [facility]. Writer spoke with Braden Lay @ [address] [place] [address] skilled care and they can resume care, LVM for [facility] dialysis in [facility] to confirm and to letthem know he will be at his chair time on Friday.      Ian Deleon RN

## 2022-10-05 NOTE — FLOWSHEET NOTE
Patient A/O    10/03/22 2056   Vital Signs   Temp 98.4 °F (36.9 °C)   Temp Source Oral   Heart Rate 62   Heart Rate Source Monitor   Resp 18   BP (!) 167/69   BP Location Right upper arm   BP Method Automatic   MAP (Calculated) 101.67   Patient Position Sitting   Level of Consciousness 0   MEWS Score 1   Pain Assessment   Pain Assessment 0-10   Pain Level 9   Patient's Stated Pain Goal 0 - No pain   Pain Location Back   Pain Orientation Lower; Upper   Pain Descriptors Aching;Discomfort   Functional Pain Assessment Activities are not prevented   Pain Type Chronic pain   Non-Pharmaceutical Pain Intervention(s) Repositioned   Response to Pain Intervention Patient satisfied   Opioid-Induced Sedation   POSS Score 1   RASS   Lamar Agitation Sedation Scale (RASS) 0   Oxygen Therapy   SpO2 100 %   Pulse Oximetry Type Intermittent   Pulse Oximeter Device Mode Intermittent   Pulse Oximeter Device Location Left;Finger   O2 Device Nasal cannula   O2 Flow Rate (L/min) 3 L/min   Patient Observation   Observations resting watching tv   Patient A/O VSS no distress noted, all needs addressed. Patient is requesting to be allowed to sleep after 2nd VS are completed.
Treatment time: 3 hours  Net UF: 3200 ml     Pre weight: 107.8 kg   Post weight: 104.9 kg  EDW: tbd kg     Access used: LFT IJ CVC  Access function: GOOD with  ml/min     Medications or blood products given: 8mg Zofran, heparin dwells     Regular outpatient schedule: MWF     Summary of response to treatment: HD tx completed in full, VSS, pt alert no distress, tolerated tx fair, nausea during last 10min of tx resolved with 8mg IVP Zofran, pt responded well and completed remainder of tx w/o complicaitons     Copy of dialysis treatment record placed in chart, to be scanned into EMR.        09/24/22 1337 09/24/22 1637   Vital Signs   BP (!) 169/97 138/84   Temp 97.8 °F (36.6 °C) 97.8 °F (36.6 °C)   Heart Rate 99 80   Resp 18 18   Weight 237 lb 10.5 oz (107.8 kg) 231 lb 4.2 oz (104.9 kg)   Weight Method Bed scale Bed scale   Percent Weight Change 0.02 -2.69
Treatment time: 3 hours  Net UF: 3200 ml    Pre weight: 114.5 kg   Post weight: 111.3 kg  EDW: TBD kg    Access used: Left chest wall TDC  Access function: Good with  ml/min    Medications or blood products given: Retacrit, Tylenol     Regular outpatient schedule: M.WJoselynF    Summary of response to treatment: Pt tolerated ok with HD, vital signs stable, keep sBP > 90s during HD, Pt c/o headache during HD, Tylenol given. HD completed in full , heparin dwell in TDC, capped and clamped. Reported to primary nurse for following up pain monitoring. Copy of dialysis treatment record placed in chart, to be scanned into EMR.    09/26/22 1638 09/26/22 1952   Vital Signs   /73 119/62   Temp 97.6 °F (36.4 °C) 97.8 °F (36.6 °C)   Heart Rate 53 63   Resp 18 16   Weight 252 lb 6.8 oz (114.5 kg) 245 lb 6 oz (111.3 kg)   Weight Method Bed scale Bed scale   Percent Weight Change 13.6 -2.79   Treatment   Time On 1648  --    Technical Checks   Machine Alarm Self Test Passed; Completed  --
Treatment time: 4 hours  Net UF: 3100 ml     Pre weight: 108 kg   Post weight: 104.1 kg  EDW: 104 kg     Access used: LIJ TDC  Access function: Good with  ml/min     Medications or blood products given: Retacrit     Regular outpatient schedule: MWF     Summary of response to treatment:    09/30/22 1236   Vital Signs   /70   Temp 98.6 °F (37 °C)   Heart Rate 53   Resp 17   Weight 229 lb 8 oz (104.1 kg)   Weight Method Actual;Standing scale   Percent Weight Change -3.16   Post-Hemodialysis Assessment   Post-Treatment Procedures Blood returned;Catheter capped, clamped and heparinized x 2 ports   Machine Disinfection Process Acid/Vinegar Clean;Heat Disinfect   Rinseback Volume (ml) 200 ml   Blood Volume Processed (Liters) 85.4 l/min   Dialyzer Clearance Lightly streaked   Duration of Treatment (minutes) 240 minutes   Hemodialysis Intake (ml) 400 ml   Hemodialysis Output (ml) 3500 ml   NET Removed (ml) 3100   Tolerated Treatment Fair   Copy of dialysis treatment record placed in chart, to be scanned into EMR.  Report called to Claudeen Sow
Treatment time: 4 hours  Net UF: 3400 ml    Pre weight: 108.3 kg   Post weight: 104.8 kg  EDW: TBD kg    Access used: Left chest wall TDC  Access function: Good with  ml/min    Medications or blood products given: Tylenol, Retacrit    Regular outpatient schedule: [facility] Summary of response to treatment: Pt tolerated ok with HD, vital signs ok, reduced UF goal and keep sBP >90s during HD, Pt c/o headache at the last 1 hour of HD, Tylenol given. Re-educated pt for fluid restrain. HD completed in full, heparin dwell in TDC, capped     Cirt Line: Initial Hct: 35.1;   End Profile : A; Refill ( Hct.1 -37.2  subtract Hct 2- 36.9): 0.3 ( no  Refill); BV: -5.2 %      Copy of dialysis treatment record placed in chart, to be scanned into EMR. 10/03/22 0834 10/03/22 1245   Vital Signs   BP (!) 144/78 (!) 144/71   Temp 97.9 °F (36.6 °C) 97.8 °F (36.6 °C)   Heart Rate 53 54   Resp 18 16   SpO2 99 % 99 %   Weight 238 lb 12.1 oz (108.3 kg) 231 lb 0.7 oz (104.8 kg)   Weight Method Actual;Bed scale Bed scale; Actual   Percent Weight Change 1.13 -3.23   Pain Assessment   Pain Level  --  6   Pain Location  --  Head   Pain Orientation  --  Mid;Inner
Treatment time: 4 hours  Net UF: 4500 ml    Pre weight: 108.1 kg   Post weight: 103.2 kg  EDW: 103.5 kg    Access used: Left chest wall TDC  Access function: Good with  ml/min    Medications or blood products given: midodrine x 2 times, 25% albumin once, Retacrit    Regular outpatient schedule: MJoselynWYI    Summary of response to treatment: Pt tolerated ok with HD, BP soft at the beginning of HD, BP get improved at the last hour of HD, educated pt fluid restriction, HD completed in full, heparin dwell in TDC, capped and clamped    Cirt Line: Initial Hct: 32.6;   End Profile : A; Refill ( Hct.1 -38.2  subtract Hct 2- 37.3): 0.9 ( yes Refill); BV: -12.7 %      Copy of dialysis treatment record placed in chart, to be scanned into EMR.     10/05/22 1150 10/05/22 1600   Vital Signs   BP (!) 143/79 (!) 141/68   Temp 97.8 °F (36.6 °C) 97.8 °F (36.6 °C)   Heart Rate 56 53   Resp 18 18   SpO2 97 % 98 %   Weight 238 lb 5.1 oz (108.1 kg) 227 lb 8.2 oz (103.2 kg)   Weight Method Actual;Bed scale Bed scale   Percent Weight Change -1.91 -4.53   Dry Weight 228 lb 2.8 oz (103.5 kg)  --
Gen: A&Ox3, NAD  Skin: no rashes, no jaundice  Abdomen: distended, tympanic, tender to palpation, well healed abdominal scars  Extremities: no edema christel, no calf pain christel

## 2022-10-05 NOTE — DISCHARGE INSTR - COC
Continuity of Care Form    Patient Name: Sallie Lugo   :  1970  MRN:  [id number]    Admit date:  2022  Discharge date:  2022    Code Status Order: Full Code   Advance Directives:   [facility] Documentation       Date/Time Healthcare Directive Type of Healthcare Directive Copy in [facility] [address] Agent's Name Healthcare Agent's Phone Number    10/02/22 1428 Unknown, patient unable to respond due to medical condition -- -- -- -- --            Admitting Physician:  Corrie Jones DO  PCP: Davion Flores MD    Discharging Nurse: [facility] Unit/Room#: 8308/2826-91  Discharging Unit Phone Number: [phone]    Emergency Contact:   Extended Emergency Contact Information  Primary Emergency Contact: Crystal Ann  Address: [address] [place] [address] [address] [facility], [address] [facility] [facility] of [address] [place] [address] Phone: [phone]  Mobile Phone: [phone]  Relation: Spouse  Secondary Emergency Contact: [address] [place] [address]  Mobile Phone: [phone]  Relation: Brother/Sister  Preferred language: English   needed?  No    Past Surgical History:  Past Surgical History:   Procedure Laterality Date    AORTIC VALVE REPLACEMENT N/A 10/15/2019    TRANSCATHETER AORTIC VALVE REPLACEMENT FEMORAL APPROACH performed by Betty Boucher MD at [facility] Right 2019    PERITONEAL DIALYSIS CATHETER REMOVAL performed by Pablo Calderón MD at [address]      WN    CORONARY ANGIOPLASTY WITH STENT PLACEMENT  05/26/15    CYST REMOVAL  2013    EXCISION CYSTS, NECK X2 AND ABDOMINAL benign    DIAGNOSTIC CARDIAC CATH LAB PROCEDURE      DIALYSIS FISTULA CREATION Left 10/30/2017    LEFT BRACHIAL CEPHALIC FISTULA    DIALYSIS FISTULA CREATION Left 3/27/2019    LIGATION  AV FISTULA performed by Rigoberto Hearn MD at [id number] [place] [address], DIAGNOSTIC      IR TUNNELED CATHETER PLACEMENT GREATER THAN 5 YEARS  3/21/2022    IR TUNNELED CATHETER PLACEMENT GREATER THAN 5 YEARS 3/21/2022 [facility]    IR TUNNELED CATHETER PLACEMENT GREATER THAN 5 YEARS  4/21/2022    IR TUNNELED CATHETER PLACEMENT GREATER THAN 5 YEARS 4/21/2022 [facility]    IR TUNNELED CATHETER PLACEMENT GREATER THAN 5 YEARS  4/26/2022    IR TUNNELED CATHETER PLACEMENT GREATER THAN 5 YEARS 4/26/2022 [facility]    IR TUNNELED CATHETER PLACEMENT GREATER THAN 5 YEARS  6/23/2022    IR TUNNELED CATHETER PLACEMENT GREATER THAN 5 YEARS 6/23/2022 [facility]    OTHER SURGICAL HISTORY  02/01/2017    laparoscopic cholecystectomy with intraoperative cholangiogram    OTHER SURGICAL HISTORY  2018    PORT PLACEMENT  - vas cath    OTHER SURGICAL HISTORY Bilateral 06/26/2018    laprascopic peritoneal dialysis catheter placement    OTHER SURGICAL HISTORY Right 09/2018    peritoneal dialysis port placed on right side of abdomen    OTHER SURGICAL HISTORY  05/28/2019    PTA/Stenting R External Iliac artery    ND LAP INSERTION TUNNELED INTRAPERITONEAL CATHETER N/A 9/21/2018    LAPAROSCOPIC PERITONEAL DIALYSIS CATHETER REPLACEMENT performed by Can Graham MD at [address] [place] [address] Pkwy 2/24/2022    PERINEAL ABCESS DRAINAGE performed by Can Graham MD at [address] [place] [address] N/A 10/2/2022    THORACENTESIS ULTRASOUND performed by Deisy Rodriguez MD at [facility]  01/06/2016    UPPER GASTROINTESTINAL ENDOSCOPY  01/29/2017    possible candida, otherwise normal appearing    VASCULAR SURGERY  aprx 2 years ago    2 stents placed, each side of groin       Immunization History:   Immunization History   Administered Date(s) Administered    Hepatitis B Adult (Engerix-B) 11/18/2017, 06/13/2018, 07/13/2018    INFLUENZA, INTRADERMAL, QUADRIVALENT, PRESERVATIVE FREE 11/16/2016    Influenza Virus Vaccine current use of insulin ([facility]) E11.9    ZAINAB (obstructive sleep apnea) G47.33    Pleural effusion on left J90    Chronic anemia D64.9    Nonrheumatic aortic valve stenosis I35.0    Mucus plugging of bronchi T17.500A    Hemodialysis-associated hypotension I95.3    ESRD (end stage renal disease) on dialysis ([facility]) N18.6, Z99.2    Hypotension due to drugs A81.7    Acute diastolic CHF (congestive heart failure) ([facility]) I50.31    Neuromuscular disorder ([facility]) G70.9    Renovascular hypertension I15.0    Mixed hyperlipidemia E78.2    Cigarette nicotine dependence in remission F17.211    Pulmonary edema J81.1    Fluid overload E87.70    Anemia of chronic disease D63.8    SOB (shortness of breath) on exertion R06.02    Steal syndrome of dialysis vascular access [facility]) T82.898A    Chronic, continuous use of opioids F11.90    Chronic bronchitis ([facility]) J42    Nasal congestion R09.81    Hypercholesteremia E78.00    Bradycardia R00.1    History of transcatheter aortic valve replacement (TAVR) Z95.2    Syncope and collapse R55    PAF (paroxysmal atrial fibrillation) ([facility]) I48.0    Bilateral leg weakness R29.898    GBS (Guillain-[place] syndrome) ([facility]) G61.0    Sinus pause I45.5    Weakness of both lower extremities R29.898    Sinus bradycardia R00.1    Ataxia R27.0    Peripheral vascular occlusive disease ([facility]) I73.9    Cellulitis of right foot L03.115    Iliac artery occlusion, 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    Cellulitis and abscess of hand L03.119, L02.519    Type 2 diabetes mellitus with hyperglycemia ([facility]) E11.65    Acute encephalopathy G93.40    Acute hypoxemic respiratory failure ([facility]) J96.01    Acute CVA (cerebrovascular accident) ([facility] I63.9    Speech problem R47.9    Urinary tract infection with hematuria N39.0, R31.9    Respiratory failure with hypoxia ([address] J96.91    Acute respiratory failure with hypercapnia ([facility]) J96.02    Acute pulmonary edema ([facility]) J81.0    Grade II diastolic dysfunction X15.45    Shock circulatory ([facility]) R57.9    Smoker F17.200    Normocytic normochromic anemia D64.9    NSTEMI (non-ST elevated myocardial infarction) ([facility]) I21.4    SIRS (systemic inflammatory response syndrome) ([facility]) R65.10    Atrial flutter ([facility]) I48.92    Liberty-rectal abscess K61.1    Somnolence R40.0    Pulmonary edema with diastolic CHF, NYHA class 3 ([facility]) I50.30    Respiratory failure ([facility] J96.90    Former smoker Z87.891    Cardiomyopathy ([facility] I42.9    Morbid obesity with BMI of 40.0-44.9, adult ([facility]) E66.01, Z68.41    Hypoglycemia E16.2    Altered mental status R41.82    Hypertensive emergency I16.1    SOB (shortness of breath) R06.02    Acute respiratory failure with hypoxia and hypercapnia ([facility]) J96.01, J96.02    HFrEF (heart failure with reduced ejection fraction) ([facility]) I50.20    Pericardial effusion I31.39       Isolation/Infection:   Isolation            No Isolation          Patient Infection Status       Infection Onset Added Last Indicated Last Indicated By Review Planned Expiration Resolved Resolved By    None active    Resolved    COVID-19 (Rule Out) 09/23/22 09/23/22 09/23/22 COVID-19, Rapid (Ordered)   09/23/22 Rule-Out Test Resulted    COVID-19 (Rule Out) 08/02/22 08/02/22 08/02/22 COVID-19, Rapid (Ordered)   08/02/22 Rule-Out Test Resulted    COVID-19 (Rule Out) 07/05/22 07/05/22 07/06/22 SARS-CoV-2 NAAT (Rapid) (Ordered)   07/06/22 Rule-Out Test Resulted    COVID-19 (Rule Out) 05/29/22 05/29/22 05/29/22 COVID-19, Rapid (Ordered)   05/29/22 Rule-Out Test Resulted    COVID-19 (Rule Out) 04/18/22 04/18/22 04/18/22 COVID-19, Rapid (Ordered)   04/18/22 Rule-Out Test Resulted    COVID-19 (Rule Out) 02/25/22 02/25/22 02/25/22 COVID-19, Rapid (Ordered)   02/25/22 Rule-Out Test Resulted    COVID-19 (Rule Out) 01/12/22 01/12/22 01/12/22 COVID-19, Rapid (Ordered)   01/12/22 Rule-Out Test Resulted    COVID-19 (Rule Out) 11/29/21 11/29/21 11/29/21 COVID-19, Rapid (Ordered)   11/30/21 Rule-Out Test Resulted    COVID-19 (Rule Out) 08/29/21 08/29/21 08/29/21 COVID-19 (Ordered)   08/29/21 Rule-Out Test Resulted    COVID-19 (Rule Out) 12/18/20 12/18/20 12/18/20 COVID-19 (Ordered)   12/18/20 Rule-Out Test Resulted    COVID-19 (Rule Out) 05/04/20 05/04/20 05/04/20 COVID-19 (Ordered)   05/04/20 Rule-Out Test Resulted            Nurse Assessment:  Last Vital Signs: BP (!) 167/80   Pulse 54   Temp 97.6 °F (36.4 °C) (Oral)   Resp 20   Ht 5' 9\" (1.753 m)   Wt 242 lb 15.2 oz (110.2 kg)   SpO2 97%   BMI 35.88 kg/m²     Last documented pain score (0-10 scale): Pain Level: 8  Last Weight:   Wt Readings from Last 1 Encounters:   10/05/22 242 lb 15.2 oz (110.2 kg)     Mental Status:  oriented, alert, coherent, and thought processes intact    IV Access:  - Dialysis Catheter  - site  left and subclavian, insertion date:      Nursing Mobility/ADLs:  Walking   Independent  Transfer  Independent  [address]  Med Delivery   whole    Wound Care Documentation and Therapy:  Wound 08/30/21 Toe (Comment  which one) Right Great Toe (Active)   Dressing Status Clean;Dry; Intact 10/04/22 2025   Wound Cleansed Not Cleansed 10/04/22 2025   Number of days: 400       Wound 01/12/22 Pedal Anterior;Right Healing scab from previous blister (per pt), flaky edges (Active)   Number of days: 266       Wound 01/12/22 Toe (Comment  which one) Anterior;Right Scab from old blister (per pt) on 4th toe, flaky edges (Active)   Number of days: 266       Incision 02/24/22 Perineum (Active)   Number of days: 222        Elimination:  Continence: Bowel: Yes  Bladder: Yes  Urinary Catheter: None   Colostomy/Ileostomy/Ileal Conduit: No       Date of Last BM:   No intake or output data in the 24 hours ending 10/05/22 1029  No intake/output data recorded. Safety Concerns:      At Risk for Falls    Impairments/Disabilities:      None    Nutrition Therapy:  Current Nutrition Therapy:   - Oral Diet:  Low Sodium (3-4gm)    Routes of Feeding: Oral  Liquids: FLUID RESTRICTION 1200 ML PER DAY  Daily Fluid Restriction: yes - amount 1200 ML  Last Modified Barium Swallow with Video (Video Swallowing Test): not done    Treatments at the Time of Hospital Discharge:   Respiratory Treatments:   Oxygen Therapy:  is on oxygen at 3 L/min per nasal cannula. Ventilator:    - BiPAP   IPAP: 16 cmH20, CPAP/EPAP: 8 cmH2O only when sleeping and device from home    Rehab Therapies:   Weight Bearing Status/Restrictions: No weight bearing restrictions  Other Medical Equipment (for information only, NOT a DME order):  wheelchair and walker  Other Treatments:     Patient's personal belongings (please select all that are sent with patient):  None    RN SIGNATURE:  Electronically signed by Candi Loving RN on 10/5/22 at 5:09 PM EDT    CASE MANAGEMENT/SOCIAL WORK SECTION    Inpatient Status Date: 09/23/22    Readmission Risk Assessment Score:  Readmission Risk              Risk of Unplanned Readmission:  86           Discharging to Facility/ [address] [place] [address]   [phone]     / signature: Electronically signed by Lainey Wheeler RN on 10/5/22 at 10:30 AM EDT    PHYSICIAN SECTION    Prognosis: Good    Condition at Discharge: Stable    Rehab Potential (if transferring to Rehab): Good    Recommended Labs or Other Treatments After Discharge: None    Physician Certification: I certify the above information and transfer of Jonas Hair  is necessary for the continuing treatment of the diagnosis listed and that he requires [address] [place] [address] for less 30 days.      Update Admission H&P: No change in H&P    PHYSICIAN SIGNATURE:  Electronically signed by Osmar Hamlin MD on 10/5/22 at 3:50 PM EDT

## 2022-10-05 NOTE — PROGRESS NOTES
Discharge: Pt discharged to home as per order. Script plus instructions given. Pt verbalized understanding. Denied questions.

## 2022-10-06 ENCOUNTER — FOLLOWUP TELEPHONE ENCOUNTER (OUTPATIENT)
Dept: TELEMETRY | Age: 52
End: 2022-10-06

## 2022-10-06 ENCOUNTER — TELEPHONE (OUTPATIENT)
Dept: FAMILY MEDICINE CLINIC | Age: 52
End: 2022-10-06

## 2022-10-06 NOTE — TELEPHONE ENCOUNTER
3rd and final Attempt; No Answer- Left HIPAA compliant voicemail with Non-Urgent Heart Failure Resource Line number for call back.      Darrell Ray RN

## 2022-10-06 NOTE — TELEPHONE ENCOUNTER
1st Attempt; No Answer- Left HIPAA compliant voicemail with Non-Urgent Heart Failure Resource Line number for call back.      Julian Avila RN

## 2022-10-06 NOTE — TELEPHONE ENCOUNTER
2nd Attempt; No Answer- Left HIPAA compliant voicemail with Non-Urgent Heart Failure Resource Line number for call back.      Marcellina Dandy, RN

## 2022-10-06 NOTE — DISCHARGE SUMMARY
Hospital Medicine Discharge Summary    Patient ID: Jose R Chanel      Patient's PCP: Kaila Pulido MD    Admit Date: 9/23/2022     Discharge Date: 10/5/2022      Admitting Physician: Gladys Morris DO     Discharge Physician: Geni Kurtz MD     Discharge Diagnoses: Active Hospital Problems    Diagnosis     CAD (coronary artery disease) [I25.10]      Priority: High    CHF (congestive heart failure) (HCC) [I50.9]      Priority: High    Pericardial effusion [I31.39]      Priority: Medium    SOB (shortness of breath) [R06.02]      Priority: Medium    Cardiomyopathy ([facility] [I42.9]      Priority: Medium    History of transcatheter aortic valve replacement (TAVR) [Z95.2]      Priority: Medium    Uncontrolled hypertension [I10]      Priority: Medium    Asthma-COPD overlap syndrome ([facility] [J44.9]      Priority: Medium    PAF (paroxysmal atrial fibrillation) ([facility]) [I48.0]     ESRD (end stage renal disease) on dialysis ([facility] [N18.6, Z99.2]     Pleural effusion on left [J90]     ZAINAB on CPAP [G47.33, Z99.89]     Obesity (BMI 30-39. 9) [E66.9]        The patient was seen and examined on day of discharge and this discharge summary is in conjunction with any daily progress note from day of discharge. Hospital Course:         CHF - acute on chronic combined sytolic/diastolic failure w/ reduced EF 20-25% by Echo June 2022. Likely due to hypertensive and ischemic heart disease. Continue current medical management and follow I/O as well as clinical response. W/ known CardioRenal syndrome. Cardiology consulted and appreciated. Nephrology consulted and appreciated. Acute on Chronic Respiratory Failure - w/ hypoxia, POArrival, likely 2nd to above. Baseline home O2 at 3L per [place]. Presence of clinical respiratory distress w/ tachypnea/dyspnea/SOB and wheezing w/ use of accessory muscles to breath. Supplemental O2 and wean as tolerated.  S/P Thoracentesis Sunday 2 October off anticoagulation - last dose of Plavix AM 27 Sept, w/ 1.5L removed. ESRD - on HD M/W/F. Nephrology consulted and appreciated. Anemia - likely 2nd to CKD, w/out evidence of active bleeding/hemolysis. Stable and asymptomatic w/out indication for transfusion. HTN/CAD - w/ known CAD but no evidence of active signs/sxs of ischemia/failure. Currently controlled on home meds. Resumed Plavix at discharge     Afib - chronic paroxysmal of unspecified and clinically unable to determine etiology. Normally rate controlled on BBlocker - continued. Anticoagulated at baseline on home Eliquis due to secondary hypercoaguable state due to Afib - initially held but resumed at discharge. Left Pleural effusion - s/p Thoracentesis as above off anticoagulation - last dose of Plavix AM 27 Sept - resumed at discharge     DM2 - diet controlled on home oral antiGlycemics/Insulin. Follow FSBS/SSI low regimen. Last HbA1c 7.2% dated this admission. Resumed home regimen at discharge. GERD - w/out active signs/sxs of dysphagia/odynophagia. No evidence of active PUD or hx of GI bleed. Controlled on home PPI - continued. Anxiety/Depression - controlled on home medications - continued. Obesity -  With Body mass index is 35.19 kg/m². Complicating assessment and treatment. Placing patient at risk for multiple co-morbidities as well as early death and contributing to the patient's presentation. Counseled on weight loss. ZAINAB - likely 2nd to obesity. Controlled on home CPAP/BiPap - continued, w/ outpatient f/u as previously arranged. Labs:  For convenience and continuity at follow-up the following most recent labs are provided:      CBC:    Lab Results   Component Value Date/Time    WBC 8.0 10/05/2022 11:53 AM    HGB 9.8 10/05/2022 11:53 AM    HCT 31.2 10/05/2022 11:53 AM     10/05/2022 11:53 AM       Renal:    Lab Results   Component Value Date/Time     10/05/2022 11:53 AM    K 5.7 10/05/2022 11:53 AM    K 4.6 09/30/2022 05:02 AM    CL 84 10/05/2022 11:53 AM    CO2 25 10/05/2022 11:53 AM    BUN 67 10/05/2022 11:53 AM    CREATININE 7.0 10/05/2022 11:53 AM    CALCIUM 8.7 10/05/2022 11:53 AM    PHOS 7.0 10/05/2022 11:53 AM         Significant Diagnostic Studies    Radiology:   XR CHEST PORTABLE   Final Result   Decreased size of left pleural effusion, status post thoracentesis. No gross   pneumothorax. Persistent atelectasis or airspace disease within the left mid lung. US CHEST INCLUDING MEDIASTINUM   Final Result   At least moderate left pleural effusion. CT CHEST WO CONTRAST   Final Result   1. Increased size of a large left pleural effusion with adjacent   consolidation in the left upper and lower lobes, particularly the lower lobe. Airspace consolidation could represent atelectasis related to the effusion or   pneumonia. 2. Stable 3 mm left upper lobe lung nodule. If patient is considered high   risk, a 1 year follow-up chest CT is suggested. RECOMMENDATIONS:   Lung nodule Fleischner Society guidelines for follow-up and management of   incidentally detected pulmonary nodules:      Single Solid Nodule:      ~Nodule size less than 6 mm. In a low-risk patient, no routine follow-up. In a high-risk patient, optional CT at 12 months. Radiology 2017 http://pubs. rsna.org/doi/full/10.1148/radiol. 1951301918         XR CHEST (2 VW)   Final Result   1. Diffuse opacity lower 2/3 left hemithorax obscuring the left hilum, left   heart margin and left hemidiaphragm slightly increased since prior study in   keeping with consolidation and pleural effusion. 2. Right lung appears clear. 3. Stable left IJ hemodialysis catheter, tip at the superior cavoatrial   junction level. XR CHEST PORTABLE   Final Result   Moderate left pleural effusion with associated left lower lobe airspace   opacities.                 Consults:     IP CONSULT TO HOSPITALIST  IP CONSULT TO HEART FAILURE NURSE/COORDINATOR  IP CONSULT TO DIETITIAN  IP CONSULT TO NEPHROLOGY  IP CONSULT TO CARDIOLOGY  IP CONSULT TO PALLIATIVE CARE  IP CONSULT TO PULMONOLOGY  IP CONSULT TO HOME CARE NEEDS    Disposition: Home w/ C     Condition at Discharge: Stable    Discharge Instructions/Follow-up:  w/ PCP 1-2 weeks and subspecialists as arranged. Code Status:  Full Code    Activity: activity as tolerated    Diet: regular diet      Discharge Medications:     Discharge Medication List as of 10/5/2022  5:09 PM             Details   sacubitril-valsartan (ENTRESTO) 24-26 MG per tablet Take 0.5 tablets by mouth 2 times daily, Disp-30 tablet, R-0Print                Details   oxyCODONE-acetaminophen (PERCOCET) 7.5-325 MG per tablet Take 1 tablet by mouth every 6 hours as needed for Pain. Historical Med      metoprolol succinate (TOPROL XL) 100 MG extended release tablet Take 1 tablet by mouth in the morning and at bedtime, Disp-30 tablet, R-3DC to [facility]      cyclobenzaprine (FLEXERIL) 5 MG tablet Muscle spasmsDC to SNF      traZODone (DESYREL) 100 MG tablet Take 100 mg by mouth nightlyHistorical Med      QUEtiapine (SEROQUEL) 50 MG tablet TAKE 1 TABLET BY MOUTH EVERY EVENING, Disp-30 tablet, R-10Normal      isosorbide dinitrate (ISORDIL) 20 MG tablet Take 1 tablet by mouth 3 times daily, Disp-90 tablet, R-3Normal      clopidogrel (PLAVIX) 75 MG tablet Take 1 tablet by mouth daily, Disp-90 tablet, R-3Normal      pantoprazole (PROTONIX) 40 MG tablet TAKE 1 TABLET BY MOUTH EVERY MORNING BEFORE BREAKFAST, Disp-30 tablet, R-10Normal      docusate sodium (DOK) 100 MG capsule Take 1 capsule by mouth daily, Disp-30 capsule, R-5Normal      LINZESS 145 MCG capsule TAKE 1 CAPSULE BY MOUTH IN THE MORNING BEFORE BREAKFAST, Disp-30 capsule, R-10Normal      DULoxetine (CYMBALTA) 30 MG extended release capsule TAKE 1 CAPSULE BY MOUTH EVERY DAY, Disp-30 capsule, R-10Normal      mineral oil liquid Take 30 mLs by mouth daily as needed for Constipation, Oral, DAILY PRN Starting Wed 3/9/2022, Disp-300 mL, R-0, Normal      sennosides-docusate sodium (SENOKOT-S) 8.6-50 MG tablet Take 1 tablet by mouth daily, Disp-10 tablet, R-0Normal      apixaban (ELIQUIS) 5 MG TABS tablet Take 1 tablet by mouth 2 times daily, Disp-60 tablet, R-1Normal      pravastatin (PRAVACHOL) 40 MG tablet Take 1 tablet by mouth daily, Disp-90 tablet, R-3Normal      Continuous Blood Gluc Sensor (DEXCOM G6 SENSOR) MISC Every 10 days, Disp-9 each, R-3Print      Continuous Blood Gluc Transmit (DEXCOM G6 TRANSMITTER) MISC 1 each by Does not apply route every 3 months, Disp-1 each, R-3Print      Continuous Blood Gluc  (DEXCOM G6 ) ADAM 1 each by Does not apply route Daily with lunch, Disp-1 each, R-0Print      B Complex-C-Folic Acid (VIRT-CAPS) 1 MG CAPS TAKE 1 CAPSULE BY MOUTH EVERY DAY, Disp-90 capsule, R-1Normal      Calcium Acetate, Phos Binder, 667 MG CAPS TAKE 1 CAPSULE BY MOUTH THREE TIMES DAILY WITH MEALS, Disp-90 capsule, R-3Normal      nitroGLYCERIN (NITROSTAT) 0.4 MG SL tablet DISSOLVE 1 TABLET UNDER THE TONGUE AS NEEDED FOR CHEST PAIN EVERY 5 MINUTES UP TO 3 TIMES. IF NO RELIEF CALL 911., Disp-25 tablet, R-10Normal      vitamin D (ERGOCALCIFEROL) 77601 units CAPS capsule TK 1 C PO WEEKLY, R-11Historical Med      Tiotropium Bromide-Olodaterol (STIOLTO RESPIMAT) 2.5-2.5 MCG/ACT AERS Inhale 2 puffs into the lungs daily, Disp-2 Inhaler, R-02 samples given:  Lot #[id number], Exp 7/21 & Lot #[id number], Exp 7/21NO PRINT      Blood Glucose Monitoring Suppl ADAM Disp-1 Device, R-0, NormalUSE AS DIRECTED.       Alcohol Swabs PADS Disp-300 each, R-3, NormalUSE AS DIRECTED      albuterol sulfate  (90 Base) MCG/ACT inhaler Inhale 2 puffs into the lungs every 6 hours as needed for Wheezing, Disp-1 Inhaler, R-3Print      ipratropium-albuterol (DUONEB) 0.5-2.5 (3) MG/3ML SOLN nebulizer solution Inhale 3 mLs into the lungs every 6 hours as needed for Shortness of Breath, Disp-360 mL, R-1Print      calcium carbonate (TUMS) 500 MG chewable tablet Take 1 tablet by mouth 3 times daily as needed for Heartburn. Historical Med             Time Spent on discharge is more than 30 minutes in the examination, evaluation, counseling and review of medications and discharge plan. Signed:    Elyse Harris MD   10/6/2022      Thank you John Navarro MD for the opportunity to be involved in this patient's care. If you have any questions or concerns please feel free to contact me at [phone].

## 2022-10-06 NOTE — TELEPHONE ENCOUNTER
-Joseph Lagos Needs Confirmation that Dr. Roderick Moreno will follow Care for patient?    -[address] states they received Skilled Nursing Orders from Dr. Roderick Moreno. -Patient's chart states he was Dismissed in July 2022.   Please Advise [phone]

## 2022-10-10 ENCOUNTER — TELEPHONE (OUTPATIENT)
Dept: CARDIOLOGY CLINIC | Age: 52
End: 2022-10-10

## 2022-10-10 NOTE — TELEPHONE ENCOUNTER
No, will not sign home health orders. This is not our practice to sign  home health orders plus he has lots more than cardiac issues being addressed.    Gilberto Rae, APRN - CNP

## 2022-10-10 NOTE — TELEPHONE ENCOUNTER
Spoke with [facility]. She says Pt has been discharged from PCP and they will not sign off on HHN orders and would like cardiology to sign orders. [employer] would you like to sign [facility] orders? I did advise [facility] to let pt know that he does need to get a new PCP and to make a new pt appt somewhere.

## 2022-10-10 NOTE — TELEPHONE ENCOUNTER
Glo Fields from [facility] stated that they received hhn orders while the pt was in the hospital. Glo Fields would like to know if npdd or if [facility] would sign off on orders? Please advise. Lisa. [phone]. Fx [phone].

## 2022-10-13 NOTE — PROGRESS NOTES
Pt transported via hospital bed by transport to dialysis, O2 switched over. [facility] aware of pt location. ankle

## 2022-10-14 RX ORDER — CITALOPRAM 40 MG/1
TABLET ORAL
Qty: 30 TABLET | Refills: 10 | OUTPATIENT
Start: 2022-10-14

## 2022-10-14 RX ORDER — CARVEDILOL 12.5 MG/1
TABLET ORAL
Qty: 60 TABLET | Refills: 10 | OUTPATIENT
Start: 2022-10-14

## 2022-10-18 ENCOUNTER — APPOINTMENT (OUTPATIENT)
Dept: GENERAL RADIOLOGY | Age: 52
DRG: 640 | End: 2022-10-18
Payer: MEDICARE

## 2022-10-18 ENCOUNTER — HOSPITAL ENCOUNTER (INPATIENT)
Age: 52
LOS: 1 days | Discharge: NURSING, REHAB OR LONG-TERM CARE | DRG: 640 | End: 2022-10-20
Attending: EMERGENCY MEDICINE | Admitting: INTERNAL MEDICINE
Payer: MEDICARE

## 2022-10-18 ENCOUNTER — TELEPHONE (OUTPATIENT)
Dept: OTHER | Facility: CLINIC | Age: 52
End: 2022-10-18

## 2022-10-18 DIAGNOSIS — E87.70 HYPERVOLEMIA, UNSPECIFIED HYPERVOLEMIA TYPE: ICD-10-CM

## 2022-10-18 DIAGNOSIS — E87.20 METABOLIC ACIDOSIS: ICD-10-CM

## 2022-10-18 DIAGNOSIS — Z91.15 DIALYSIS PATIENT, NONCOMPLIANT (HCC): ICD-10-CM

## 2022-10-18 DIAGNOSIS — J96.01 ACUTE RESPIRATORY FAILURE WITH HYPOXIA (HCC): Primary | ICD-10-CM

## 2022-10-18 DIAGNOSIS — N18.6 ESRD (END STAGE RENAL DISEASE) (HCC): ICD-10-CM

## 2022-10-18 LAB
A/G RATIO: 1 (ref 1.1–2.2)
ALBUMIN SERPL-MCNC: 3.8 G/DL (ref 3.4–5)
ALP BLD-CCNC: 101 U/L (ref 40–129)
ALT SERPL-CCNC: 12 U/L (ref 10–40)
ANION GAP SERPL CALCULATED.3IONS-SCNC: 23 MMOL/L (ref 3–16)
APTT: 28.2 SEC (ref 23–34.3)
AST SERPL-CCNC: 16 U/L (ref 15–37)
BASE EXCESS VENOUS: -7.3 MMOL/L (ref -3–3)
BASOPHILS ABSOLUTE: 0.1 K/UL (ref 0–0.2)
BASOPHILS RELATIVE PERCENT: 0.6 %
BILIRUB SERPL-MCNC: 0.4 MG/DL (ref 0–1)
BUN BLDV-MCNC: 92 MG/DL (ref 7–20)
CALCIUM SERPL-MCNC: 9.2 MG/DL (ref 8.3–10.6)
CARBOXYHEMOGLOBIN: 4.1 % (ref 0–1.5)
CHLORIDE BLD-SCNC: 92 MMOL/L (ref 99–110)
CO2: 17 MMOL/L (ref 21–32)
CREAT SERPL-MCNC: 9.6 MG/DL (ref 0.9–1.3)
EOSINOPHILS ABSOLUTE: 0.5 K/UL (ref 0–0.6)
EOSINOPHILS RELATIVE PERCENT: 2.9 %
GFR SERPL CREATININE-BSD FRML MDRD: 6 ML/MIN/{1.73_M2}
GLUCOSE BLD-MCNC: 238 MG/DL (ref 70–99)
HCO3 VENOUS: 19 MMOL/L (ref 23–29)
HCT VFR BLD CALC: 37.2 % (ref 40.5–52.5)
HEMOGLOBIN: 11.5 G/DL (ref 13.5–17.5)
INR BLD: 1.06 (ref 0.87–1.14)
LACTIC ACID: 1.4 MMOL/L (ref 0.4–2)
LYMPHOCYTES ABSOLUTE: 0.9 K/UL (ref 1–5.1)
LYMPHOCYTES RELATIVE PERCENT: 5.3 %
MCH RBC QN AUTO: 28.1 PG (ref 26–34)
MCHC RBC AUTO-ENTMCNC: 31 G/DL (ref 31–36)
MCV RBC AUTO: 90.5 FL (ref 80–100)
METHEMOGLOBIN VENOUS: 0.3 %
MONOCYTES ABSOLUTE: 0.5 K/UL (ref 0–1.3)
MONOCYTES RELATIVE PERCENT: 3 %
NEUTROPHILS ABSOLUTE: 14.6 K/UL (ref 1.7–7.7)
NEUTROPHILS RELATIVE PERCENT: 88.2 %
O2 SAT, VEN: 95 %
O2 THERAPY: ABNORMAL
PCO2, VEN: 41.4 MMHG (ref 40–50)
PDW BLD-RTO: 16.7 % (ref 12.4–15.4)
PH VENOUS: 7.28 (ref 7.35–7.45)
PLATELET # BLD: 360 K/UL (ref 135–450)
PMV BLD AUTO: 8.1 FL (ref 5–10.5)
PO2, VEN: 86.3 MMHG (ref 25–40)
POTASSIUM REFLEX MAGNESIUM: 5.8 MMOL/L (ref 3.5–5.1)
PROTHROMBIN TIME: 13.8 SEC (ref 11.7–14.5)
RBC # BLD: 4.11 M/UL (ref 4.2–5.9)
SARS-COV-2, NAAT: NOT DETECTED
SODIUM BLD-SCNC: 132 MMOL/L (ref 136–145)
TCO2 CALC VENOUS: 20 MMOL/L
TOTAL PROTEIN: 7.6 G/DL (ref 6.4–8.2)
TROPONIN: 0.11 NG/ML
WBC # BLD: 16.5 K/UL (ref 4–11)

## 2022-10-18 PROCEDURE — 83605 ASSAY OF LACTIC ACID: CPT

## 2022-10-18 PROCEDURE — 82803 BLOOD GASES ANY COMBINATION: CPT

## 2022-10-18 PROCEDURE — 83880 ASSAY OF NATRIURETIC PEPTIDE: CPT

## 2022-10-18 PROCEDURE — 94660 CPAP INITIATION&MGMT: CPT

## 2022-10-18 PROCEDURE — 80053 COMPREHEN METABOLIC PANEL: CPT

## 2022-10-18 PROCEDURE — 85610 PROTHROMBIN TIME: CPT

## 2022-10-18 PROCEDURE — 99285 EMERGENCY DEPT VISIT HI MDM: CPT

## 2022-10-18 PROCEDURE — 96374 THER/PROPH/DIAG INJ IV PUSH: CPT

## 2022-10-18 PROCEDURE — 6370000000 HC RX 637 (ALT 250 FOR IP): Performed by: EMERGENCY MEDICINE

## 2022-10-18 PROCEDURE — 84484 ASSAY OF TROPONIN QUANT: CPT

## 2022-10-18 PROCEDURE — 87635 SARS-COV-2 COVID-19 AMP PRB: CPT

## 2022-10-18 PROCEDURE — 93005 ELECTROCARDIOGRAM TRACING: CPT | Performed by: EMERGENCY MEDICINE

## 2022-10-18 PROCEDURE — 85730 THROMBOPLASTIN TIME PARTIAL: CPT

## 2022-10-18 PROCEDURE — 71045 X-RAY EXAM CHEST 1 VIEW: CPT

## 2022-10-18 PROCEDURE — 85025 COMPLETE CBC W/AUTO DIFF WBC: CPT

## 2022-10-18 RX ORDER — ISOSORBIDE DINITRATE 20 MG/1
20 TABLET ORAL ONCE
Status: COMPLETED | OUTPATIENT
Start: 2022-10-18 | End: 2022-10-18

## 2022-10-18 RX ORDER — METOPROLOL SUCCINATE 50 MG/1
100 TABLET, EXTENDED RELEASE ORAL ONCE
Status: COMPLETED | OUTPATIENT
Start: 2022-10-18 | End: 2022-10-18

## 2022-10-18 RX ADMIN — ISOSORBIDE DINITRATE 20 MG: 20 TABLET ORAL at 22:21

## 2022-10-18 RX ADMIN — METOPROLOL SUCCINATE 100 MG: 50 TABLET, EXTENDED RELEASE ORAL at 22:21

## 2022-10-18 ASSESSMENT — PAIN DESCRIPTION - ORIENTATION: ORIENTATION: LEFT

## 2022-10-18 ASSESSMENT — PAIN DESCRIPTION - LOCATION: LOCATION: CHEST

## 2022-10-18 ASSESSMENT — PAIN - FUNCTIONAL ASSESSMENT: PAIN_FUNCTIONAL_ASSESSMENT: 0-10

## 2022-10-18 ASSESSMENT — PAIN DESCRIPTION - DESCRIPTORS: DESCRIPTORS: PRESSURE

## 2022-10-19 PROBLEM — E87.70 HYPERVOLEMIA: Status: ACTIVE | Noted: 2022-10-19

## 2022-10-19 LAB
ALBUMIN SERPL-MCNC: 3.8 G/DL (ref 3.4–5)
ANION GAP SERPL CALCULATED.3IONS-SCNC: 15 MMOL/L (ref 3–16)
BUN BLDV-MCNC: 108 MG/DL (ref 7–20)
CALCIUM SERPL-MCNC: 8.4 MG/DL (ref 8.3–10.6)
CHLORIDE BLD-SCNC: 92 MMOL/L (ref 99–110)
CO2: 22 MMOL/L (ref 21–32)
CREAT SERPL-MCNC: 10.3 MG/DL (ref 0.9–1.3)
GFR SERPL CREATININE-BSD FRML MDRD: 5 ML/MIN/{1.73_M2}
GLUCOSE BLD-MCNC: 166 MG/DL (ref 70–99)
GLUCOSE BLD-MCNC: 169 MG/DL (ref 70–99)
PERFORMED ON: ABNORMAL
PHOSPHORUS: 6.4 MG/DL (ref 2.5–4.9)
POTASSIUM SERPL-SCNC: 5.9 MMOL/L (ref 3.5–5.1)
POTASSIUM SERPL-SCNC: 6.1 MMOL/L (ref 3.5–5.1)
PRO-BNP: ABNORMAL PG/ML (ref 0–124)
SODIUM BLD-SCNC: 129 MMOL/L (ref 136–145)

## 2022-10-19 PROCEDURE — 36415 COLL VENOUS BLD VENIPUNCTURE: CPT

## 2022-10-19 PROCEDURE — 80069 RENAL FUNCTION PANEL: CPT

## 2022-10-19 PROCEDURE — 5A1D70Z PERFORMANCE OF URINARY FILTRATION, INTERMITTENT, LESS THAN 6 HOURS PER DAY: ICD-10-PCS | Performed by: INTERNAL MEDICINE

## 2022-10-19 PROCEDURE — 2500000003 HC RX 250 WO HCPCS: Performed by: INTERNAL MEDICINE

## 2022-10-19 PROCEDURE — 6360000002 HC RX W HCPCS: Performed by: INTERNAL MEDICINE

## 2022-10-19 PROCEDURE — 90935 HEMODIALYSIS ONE EVALUATION: CPT

## 2022-10-19 PROCEDURE — 2060000000 HC ICU INTERMEDIATE R&B

## 2022-10-19 PROCEDURE — 6360000002 HC RX W HCPCS

## 2022-10-19 PROCEDURE — 6370000000 HC RX 637 (ALT 250 FOR IP): Performed by: INTERNAL MEDICINE

## 2022-10-19 PROCEDURE — 94660 CPAP INITIATION&MGMT: CPT

## 2022-10-19 PROCEDURE — 2700000000 HC OXYGEN THERAPY PER DAY

## 2022-10-19 PROCEDURE — 6360000002 HC RX W HCPCS: Performed by: EMERGENCY MEDICINE

## 2022-10-19 PROCEDURE — 94640 AIRWAY INHALATION TREATMENT: CPT

## 2022-10-19 PROCEDURE — 94761 N-INVAS EAR/PLS OXIMETRY MLT: CPT

## 2022-10-19 PROCEDURE — 84132 ASSAY OF SERUM POTASSIUM: CPT

## 2022-10-19 RX ORDER — CHOLECALCIFEROL (VITAMIN D3) 10 MCG
1 TABLET ORAL DAILY
Status: DISCONTINUED | OUTPATIENT
Start: 2022-10-19 | End: 2022-10-20 | Stop reason: HOSPADM

## 2022-10-19 RX ORDER — METOPROLOL SUCCINATE 50 MG/1
100 TABLET, EXTENDED RELEASE ORAL 2 TIMES DAILY
Status: DISCONTINUED | OUTPATIENT
Start: 2022-10-19 | End: 2022-10-20 | Stop reason: HOSPADM

## 2022-10-19 RX ORDER — OXYCODONE AND ACETAMINOPHEN 7.5; 325 MG/1; MG/1
1 TABLET ORAL EVERY 6 HOURS PRN
Status: DISCONTINUED | OUTPATIENT
Start: 2022-10-19 | End: 2022-10-20 | Stop reason: HOSPADM

## 2022-10-19 RX ORDER — PRAVASTATIN SODIUM 40 MG
40 TABLET ORAL DAILY
Status: DISCONTINUED | OUTPATIENT
Start: 2022-10-19 | End: 2022-10-20 | Stop reason: HOSPADM

## 2022-10-19 RX ORDER — SODIUM CHLORIDE 9 MG/ML
INJECTION, SOLUTION INTRAVENOUS PRN
Status: DISCONTINUED | OUTPATIENT
Start: 2022-10-19 | End: 2022-10-20 | Stop reason: HOSPADM

## 2022-10-19 RX ORDER — CALCIUM CARBONATE 200(500)MG
1000 TABLET,CHEWABLE ORAL 3 TIMES DAILY PRN
Status: DISCONTINUED | OUTPATIENT
Start: 2022-10-19 | End: 2022-10-19

## 2022-10-19 RX ORDER — CALCIUM ACETATE 667 MG/1
667 CAPSULE ORAL
Status: DISCONTINUED | OUTPATIENT
Start: 2022-10-19 | End: 2022-10-20 | Stop reason: HOSPADM

## 2022-10-19 RX ORDER — IPRATROPIUM BROMIDE AND ALBUTEROL SULFATE 2.5; .5 MG/3ML; MG/3ML
1 SOLUTION RESPIRATORY (INHALATION) EVERY 6 HOURS PRN
Status: DISCONTINUED | OUTPATIENT
Start: 2022-10-19 | End: 2022-10-20 | Stop reason: HOSPADM

## 2022-10-19 RX ORDER — LANOLIN ALCOHOL/MO/W.PET/CERES
3 CREAM (GRAM) TOPICAL NIGHTLY PRN
Status: DISCONTINUED | OUTPATIENT
Start: 2022-10-19 | End: 2022-10-20 | Stop reason: HOSPADM

## 2022-10-19 RX ORDER — DULOXETIN HYDROCHLORIDE 30 MG/1
30 CAPSULE, DELAYED RELEASE ORAL DAILY
Status: DISCONTINUED | OUTPATIENT
Start: 2022-10-19 | End: 2022-10-20 | Stop reason: HOSPADM

## 2022-10-19 RX ORDER — ONDANSETRON 2 MG/ML
4 INJECTION INTRAMUSCULAR; INTRAVENOUS EVERY 6 HOURS PRN
Status: DISCONTINUED | OUTPATIENT
Start: 2022-10-19 | End: 2022-10-20 | Stop reason: HOSPADM

## 2022-10-19 RX ORDER — TRAZODONE HYDROCHLORIDE 50 MG/1
100 TABLET ORAL NIGHTLY
Status: DISCONTINUED | OUTPATIENT
Start: 2022-10-19 | End: 2022-10-20 | Stop reason: HOSPADM

## 2022-10-19 RX ORDER — ONDANSETRON 4 MG/1
4 TABLET, ORALLY DISINTEGRATING ORAL EVERY 8 HOURS PRN
Status: DISCONTINUED | OUTPATIENT
Start: 2022-10-19 | End: 2022-10-20 | Stop reason: HOSPADM

## 2022-10-19 RX ORDER — DOCUSATE SODIUM 100 MG/1
100 CAPSULE, LIQUID FILLED ORAL DAILY
Status: DISCONTINUED | OUTPATIENT
Start: 2022-10-19 | End: 2022-10-20 | Stop reason: HOSPADM

## 2022-10-19 RX ORDER — CLOPIDOGREL BISULFATE 75 MG/1
75 TABLET ORAL DAILY
Status: DISCONTINUED | OUTPATIENT
Start: 2022-10-19 | End: 2022-10-20 | Stop reason: HOSPADM

## 2022-10-19 RX ORDER — SODIUM CHLORIDE 0.9 % (FLUSH) 0.9 %
10 SYRINGE (ML) INJECTION PRN
Status: DISCONTINUED | OUTPATIENT
Start: 2022-10-19 | End: 2022-10-20 | Stop reason: HOSPADM

## 2022-10-19 RX ORDER — ACETAMINOPHEN 325 MG/1
650 TABLET ORAL EVERY 6 HOURS PRN
Status: DISCONTINUED | OUTPATIENT
Start: 2022-10-19 | End: 2022-10-20 | Stop reason: HOSPADM

## 2022-10-19 RX ORDER — ISOSORBIDE DINITRATE 20 MG/1
20 TABLET ORAL 3 TIMES DAILY
Status: DISCONTINUED | OUTPATIENT
Start: 2022-10-19 | End: 2022-10-20 | Stop reason: HOSPADM

## 2022-10-19 RX ORDER — CALCIUM CARBONATE 200(500)MG
500 TABLET,CHEWABLE ORAL 3 TIMES DAILY PRN
Status: DISCONTINUED | OUTPATIENT
Start: 2022-10-19 | End: 2022-10-20 | Stop reason: HOSPADM

## 2022-10-19 RX ORDER — ACETAMINOPHEN 650 MG/1
650 SUPPOSITORY RECTAL EVERY 6 HOURS PRN
Status: DISCONTINUED | OUTPATIENT
Start: 2022-10-19 | End: 2022-10-20 | Stop reason: HOSPADM

## 2022-10-19 RX ORDER — FUROSEMIDE 10 MG/ML
120 INJECTION INTRAMUSCULAR; INTRAVENOUS ONCE
Status: COMPLETED | OUTPATIENT
Start: 2022-10-19 | End: 2022-10-19

## 2022-10-19 RX ORDER — HEPARIN SODIUM 5000 [USP'U]/ML
5000 INJECTION, SOLUTION INTRAVENOUS; SUBCUTANEOUS EVERY 8 HOURS SCHEDULED
Status: CANCELLED | OUTPATIENT
Start: 2022-10-19

## 2022-10-19 RX ORDER — HEPARIN SODIUM 1000 [USP'U]/ML
3600 INJECTION, SOLUTION INTRAVENOUS; SUBCUTANEOUS ONCE
Status: DISCONTINUED | OUTPATIENT
Start: 2022-10-19 | End: 2022-10-20 | Stop reason: HOSPADM

## 2022-10-19 RX ORDER — QUETIAPINE FUMARATE 25 MG/1
50 TABLET, FILM COATED ORAL NIGHTLY
Status: DISCONTINUED | OUTPATIENT
Start: 2022-10-19 | End: 2022-10-20 | Stop reason: HOSPADM

## 2022-10-19 RX ORDER — HEPARIN SODIUM 1000 [USP'U]/ML
INJECTION, SOLUTION INTRAVENOUS; SUBCUTANEOUS
Status: COMPLETED
Start: 2022-10-19 | End: 2022-10-19

## 2022-10-19 RX ORDER — PANTOPRAZOLE SODIUM 40 MG/1
40 TABLET, DELAYED RELEASE ORAL
Status: DISCONTINUED | OUTPATIENT
Start: 2022-10-19 | End: 2022-10-20 | Stop reason: HOSPADM

## 2022-10-19 RX ADMIN — ISOSORBIDE DINITRATE 20 MG: 20 TABLET ORAL at 23:33

## 2022-10-19 RX ADMIN — CLOPIDOGREL BISULFATE 75 MG: 75 TABLET ORAL at 08:09

## 2022-10-19 RX ADMIN — QUETIAPINE FUMARATE 50 MG: 25 TABLET ORAL at 23:32

## 2022-10-19 RX ADMIN — OXYCODONE AND ACETAMINOPHEN 1 TABLET: 7.5; 325 TABLET ORAL at 11:20

## 2022-10-19 RX ADMIN — PRAVASTATIN SODIUM 40 MG: 40 TABLET ORAL at 08:10

## 2022-10-19 RX ADMIN — HEPARIN SODIUM 10000 UNITS: 1000 INJECTION INTRAVENOUS; SUBCUTANEOUS at 21:08

## 2022-10-19 RX ADMIN — ISOSORBIDE DINITRATE 20 MG: 20 TABLET ORAL at 16:11

## 2022-10-19 RX ADMIN — OXYCODONE AND ACETAMINOPHEN 1 TABLET: 7.5; 325 TABLET ORAL at 17:56

## 2022-10-19 RX ADMIN — APIXABAN 2.5 MG: 2.5 TABLET, FILM COATED ORAL at 23:33

## 2022-10-19 RX ADMIN — APIXABAN 2.5 MG: 2.5 TABLET, FILM COATED ORAL at 08:09

## 2022-10-19 RX ADMIN — ACETAMINOPHEN 650 MG: 325 TABLET, FILM COATED ORAL at 22:14

## 2022-10-19 RX ADMIN — DULOXETINE HYDROCHLORIDE 30 MG: 30 CAPSULE, DELAYED RELEASE ORAL at 08:10

## 2022-10-19 RX ADMIN — CALCIUM ACETATE 667 MG: 667 CAPSULE ORAL at 11:20

## 2022-10-19 RX ADMIN — CALCIUM ACETATE 667 MG: 667 CAPSULE ORAL at 08:09

## 2022-10-19 RX ADMIN — ONDANSETRON 4 MG: 2 INJECTION INTRAMUSCULAR; INTRAVENOUS at 22:15

## 2022-10-19 RX ADMIN — METOPROLOL SUCCINATE 100 MG: 50 TABLET, EXTENDED RELEASE ORAL at 08:14

## 2022-10-19 RX ADMIN — SACUBITRIL AND VALSARTAN 0.5 TABLET: 24; 26 TABLET, FILM COATED ORAL at 23:32

## 2022-10-19 RX ADMIN — TRAZODONE HYDROCHLORIDE 100 MG: 50 TABLET ORAL at 23:33

## 2022-10-19 RX ADMIN — ISOSORBIDE DINITRATE 20 MG: 20 TABLET ORAL at 08:14

## 2022-10-19 RX ADMIN — PANTOPRAZOLE SODIUM 40 MG: 40 TABLET, DELAYED RELEASE ORAL at 05:37

## 2022-10-19 RX ADMIN — FUROSEMIDE 120 MG: 10 INJECTION, SOLUTION INTRAMUSCULAR; INTRAVENOUS at 00:42

## 2022-10-19 RX ADMIN — CALCIUM ACETATE 667 MG: 667 CAPSULE ORAL at 16:11

## 2022-10-19 RX ADMIN — METOPROLOL SUCCINATE 100 MG: 50 TABLET, EXTENDED RELEASE ORAL at 23:32

## 2022-10-19 RX ADMIN — SACUBITRIL AND VALSARTAN 0.5 TABLET: 24; 26 TABLET, FILM COATED ORAL at 08:10

## 2022-10-19 RX ADMIN — NEPHROCAP 1 MG: 1 CAP ORAL at 08:14

## 2022-10-19 RX ADMIN — Medication 3 MG: at 23:33

## 2022-10-19 RX ADMIN — TIOTROPIUM BROMIDE AND OLODATEROL 2 PUFF: 3.124; 2.736 SPRAY, METERED RESPIRATORY (INHALATION) at 07:18

## 2022-10-19 ASSESSMENT — PAIN DESCRIPTION - LOCATION
LOCATION: BACK
LOCATION: BACK;HIP
LOCATION: BACK
LOCATION: BACK;HIP

## 2022-10-19 ASSESSMENT — ENCOUNTER SYMPTOMS
GASTROINTESTINAL NEGATIVE: 1
SHORTNESS OF BREATH: 1

## 2022-10-19 ASSESSMENT — PAIN SCALES - GENERAL
PAINLEVEL_OUTOF10: 9
PAINLEVEL_OUTOF10: 9
PAINLEVEL_OUTOF10: 10
PAINLEVEL_OUTOF10: 5
PAINLEVEL_OUTOF10: 9
PAINLEVEL_OUTOF10: 10
PAINLEVEL_OUTOF10: 9

## 2022-10-19 ASSESSMENT — PAIN DESCRIPTION - ORIENTATION
ORIENTATION: LOWER
ORIENTATION: LOWER;RIGHT;LEFT

## 2022-10-19 ASSESSMENT — PAIN DESCRIPTION - PAIN TYPE: TYPE: CHRONIC PAIN

## 2022-10-19 ASSESSMENT — PAIN DESCRIPTION - DESCRIPTORS: DESCRIPTORS: THROBBING

## 2022-10-19 NOTE — PROGRESS NOTES
10/19/22 0417   RT Protocol   History Pulmonary Disease 1   Respiratory pattern 0   Breath sounds 2   Cough 0   Indications for Bronchodilator Therapy On home bronchodilators   Bronchodilator Assessment Score 3

## 2022-10-19 NOTE — ED NOTES
Pt taken off bipap to administer P.O medications per Dr. Bijal Rdz.  Pt placed on 6L nasal cannula during administration     Isael Jaffe RN  10/18/22 5904

## 2022-10-19 NOTE — TELEPHONE ENCOUNTER
Writer contacted Dr. Jessica Jimenez to inform of 30 day readmission risk. 's attempt to contact  was unsuccessful.      Call Back: If you need to call back to inform of disposition you can contact me at [phone]

## 2022-10-19 NOTE — H&P
Hospital Medicine History & Physical      Patient: Jonas Hair  :  1970  MRN:  [id number]    Date of Service: 10/19/22    Chief Complaint   Patient presents with    Shortness of Breath     Pt arrives from home via EMS for SOB. 70's on 2L baseline at home. Squad placed him on CPAP. Missed dialysis appt on Monday. Chest Pain     States it is a pressure 8/10       HISTORY OF PRESENT ILLNESS:    Jonas Hair is a 45 y.o. male. He presented ot the ER from home by ambulance for dyspnea. He has multiple chronic medical problems including ESRD on HD. He is supposed to be on a qMWF scheduled. He missed dialysis scheduled for Monday 10/17/22. He related his ride did not show up. He is hospitalized frequently for similar issues. This is his eleventh hospitalization this year. He has a long h/o noncompliance. Review of Systems:  All pertinent positives and negatives are as noted in the HPI section. All other systems were reviewed and are negative.     Past Medical History:   Diagnosis Date    Ambulatory dysfunction     walker for long distances, SOB with distance    Aortic stenosis     echo     Arthritis     hands and hips    Asthma     Bilateral hilar adenopathy syndrome 6/3/2013    CAD (coronary artery disease)     Dr. Apolinar [facility]) 2019    EF= 43%    CHF (congestive heart failure) ([address]     Chronic pain     COPD (chronic obstructive pulmonary disease) ([address]     pulmonology Dr. Hayley Leblanc    Depression     Diabetes mellitus ([address]     borderline    Difficult intravenous access     Emphysema of lung ([address]     ESRD (end stage renal disease) on dialysis ([address]     MWF    Fear of needles     Gastric ulcer     GERD (gastroesophageal reflux disease)     Heart valve problem     bicuspic valve    Hemodialysis patient ([address]     History of spinal fracture     work incident    Hx of blood clots     Bilateral lower extremities; stents in place    Hyperlipidemia Hypertension     MI (myocardial infarction) ([facility] 2019    has had 9 MIs. 2019 was the last    Neuromuscular disorder ([address]     due to CVA    Numbness and tingling in left arm     from fistula    Pneumonia     PONV (postoperative nausea and vomiting)     Prolonged emergence from general anesthesia     States requires more medication than most people    Sleep apnea     Uses CPAP    Stroke ([address]     7mm thalamic cva 2017 deficts left side, left side weakness    TIA (transient ischemic attack)     Unspecified diseases of blood and blood-forming organs        Past Surgical History:   Procedure Laterality Date    AORTIC VALVE REPLACEMENT N/A 10/15/2019    TRANSCATHETER AORTIC VALVE REPLACEMENT FEMORAL APPROACH performed by Claribel Farrell MD at [address] [facility] Right 7/2/2019    PERITONEAL DIALYSIS CATHETER REMOVAL performed by Ruddy Gomez MD at [address] [place] [address]  2/29/2015    [facility]    CORONARY ANGIOPLASTY WITH STENT PLACEMENT  05/26/15    CYST REMOVAL  08/14/2013    EXCISION CYSTS, NECK X2 AND ABDOMINAL benign    DIAGNOSTIC CARDIAC CATH LAB PROCEDURE      DIALYSIS FISTULA CREATION Left 10/30/2017    LEFT BRACHIAL CEPHALIC FISTULA    DIALYSIS FISTULA CREATION Left 3/27/2019    LIGATION  AV FISTULA performed by Simeon Montesinos MD at [id number] [place] [address], [place], DIAGNOSTIC      IR TUNNELED CATHETER PLACEMENT GREATER THAN 5 YEARS  3/21/2022    IR TUNNELED CATHETER PLACEMENT GREATER THAN 5 YEARS 3/21/2022 [facility]    IR TUNNELED CATHETER PLACEMENT GREATER THAN 5 YEARS  4/21/2022    IR TUNNELED CATHETER PLACEMENT GREATER THAN 5 YEARS 4/21/2022 [facility]    IR TUNNELED CATHETER PLACEMENT GREATER THAN 5 YEARS  4/26/2022    IR TUNNELED CATHETER PLACEMENT GREATER THAN 5 YEARS 4/26/2022 [facility]    IR TUNNELED CATHETER PLACEMENT GREATER THAN 5 YEARS  6/23/2022    IR TUNNELED CATHETER PLACEMENT GREATER THAN 5 YEARS 6/23/2022 [facility]    OTHER SURGICAL HISTORY  02/01/2017    laparoscopic cholecystectomy with intraoperative cholangiogram    OTHER SURGICAL HISTORY  2018    PORT PLACEMENT  - vas cath    OTHER SURGICAL HISTORY Bilateral 06/26/2018    laprascopic peritoneal dialysis catheter placement    OTHER SURGICAL HISTORY Right 09/2018    peritoneal dialysis port placed on right side of abdomen    OTHER SURGICAL HISTORY  05/28/2019    PTA/Stenting R External Iliac artery    AL LAP INSERTION TUNNELED INTRAPERITONEAL CATHETER N/A 9/21/2018    LAPAROSCOPIC PERITONEAL DIALYSIS CATHETER REPLACEMENT performed by Hallei Osborne MD at [address] [facility] 2/24/2022    PERINEAL ABCESS DRAINAGE performed by Hallie Osborne MD at [address] [place] [address] N/A 10/2/2022    THORACENTESIS ULTRASOUND performed by Fernandez Rogers MD at [address] [place] [address]  01/06/2016    UPPER GASTROINTESTINAL ENDOSCOPY  01/29/2017    possible candida, otherwise normal appearing    VASCULAR SURGERY  aprx 2 years ago    2 stents placed, each side of groin         Prior to Admission medications    Medication Sig Start Date End Date Taking? Authorizing Provider   sacubitril-valsartan (ENTRESTO) 24-26 MG per tablet Take 0.5 tablets by mouth 2 times daily 10/5/22 11/4/22  Syndie Rogers MD   oxyCODONE-acetaminophen (PERCOCET) 7.5-325 MG per tablet Take 1 tablet by mouth every 6 hours as needed for Pain.     Historical Provider, MD   metoprolol succinate (TOPROL XL) 100 MG extended release tablet Take 1 tablet by mouth in the morning and at bedtime 7/21/22   Vero Mendez MD   cyclobenzaprine (FLEXERIL) 5 MG tablet Muscle spasms 7/21/22   Vero Mendez MD   traZODone (DESYREL) 100 MG tablet Take 100 mg by mouth nightly    Historical Provider, MD   QUEtiapine (SEROQUEL) 50 MG tablet TAKE 1 TABLET BY MOUTH EVERY EVENING 6/30/22   Gurvinder Mcallister MD   isosorbide dinitrate (ISORDIL) 20 MG tablet Take 1 tablet by mouth 3 times daily 6/8/22   JASE Toussaint   clopidogrel (PLAVIX) 75 MG tablet Take 1 tablet by mouth daily 5/9/22   JAY Schneider CNP   pantoprazole (PROTONIX) 40 MG tablet TAKE 1 TABLET BY MOUTH EVERY MORNING BEFORE BREAKFAST 4/28/22   Caio Martínez MD   docusate sodium (DOK) 100 MG capsule Take 1 capsule by mouth daily 4/27/22   Elvsi Davidson MD   LINZESS 145 MCG capsule TAKE 1 CAPSULE BY MOUTH IN THE MORNING BEFORE BREAKFAST 4/27/22   Caio Martínez MD   DULoxetine (CYMBALTA) 30 MG extended release capsule TAKE 1 CAPSULE BY MOUTH EVERY DAY 3/29/22   Caio Martínez MD   mineral oil liquid Take 30 mLs by mouth daily as needed for Constipation 3/9/22   Caio Martínez MD   sennosides-docusate sodium (SENOKOT-S) 8.6-50 MG tablet Take 1 tablet by mouth daily 3/9/22   Caio Martínez MD   apixaban (ELIQUIS) 5 MG TABS tablet Take 1 tablet by mouth 2 times daily 3/3/22   Fanny Garrett MD   pravastatin (PRAVACHOL) 40 MG tablet Take 1 tablet by mouth daily 2/10/22   JAY Schneider CNP   Continuous Blood Gluc Sensor (DEXCOM G6 SENSOR) MISC Every 10 days 10/5/21   Caio Martínez MD   Continuous Blood Gluc Transmit (DEXCOM G6 TRANSMITTER) MISC 1 each by Does not apply route every 3 months 10/5/21   Caio Martínez MD   Continuous Blood Gluc  ([facility] ) [address] 1 each by Does not apply route Daily with lunch 10/5/21   Caio Martínez MD   B Complex-C-Folic Acid (VIRT-CAPS) 1 MG CAPS TAKE 1 CAPSULE BY MOUTH EVERY DAY 9/20/21   Caio Martínez MD   Calcium Acetate, Phos Binder, 667 MG CAPS TAKE 1 CAPSULE BY MOUTH THREE TIMES DAILY WITH MEALS 8/12/21   Caio Martínez MD   nitroGLYCERIN (NITROSTAT) 0.4 MG SL tablet DISSOLVE 1 TABLET UNDER THE TONGUE AS NEEDED FOR CHEST PAIN EVERY 5 MINUTES UP TO 3 TIMES.  IF NO RELIEF CALL 911. 1/7/21   Caio Martínez MD   vitamin D (ERGOCALCIFEROL) 20755 units CAPS capsule TK 1 C PO WEEKLY 6/2/19   Historical Provider, MD   Tiotropium Bromide-Olodaterol (STIOLTO RESPIMAT) 2.5-2.5 MCG/ACT AERS Inhale 2 puffs into the lungs daily 5/21/19   Estuardo Medellin MD   Blood Glucose Monitoring Suppl ADAM USE AS DIRECTED. 4/25/18   Saadia Monroe MD   Alcohol Swabs PADS USE AS DIRECTED 4/25/18   Saadia Monroe MD   albuterol sulfate  (90 Base) MCG/ACT inhaler Inhale 2 puffs into the lungs every 6 hours as needed for Wheezing 11/8/17   [place] Diaz MD   ipratropium-albuterol (DUONEB) 0.5-2.5 (3) MG/3ML SOLN nebulizer solution Inhale 3 mLs into the lungs every 6 hours as needed for Shortness of Breath 10/15/17   Hilary Romero MD   calcium carbonate (TUMS) 500 MG chewable tablet Take 1 tablet by mouth 3 times daily as needed for Heartburn. Historical Provider, MD       Allergies:   Morphine    Social:   reports that he quit smoking about 2 years ago. His smoking use included cigarettes. He has a 16.50 pack-year smoking history. He has never used smokeless tobacco.   reports that he does not currently use alcohol. Social History     Substance and Sexual Activity   Drug Use No       Family History   Problem Relation Age of Onset    Diabetes Mother     Heart Disease Father     Kidney Disease Sister         stage 4-kidney failure    Cancer Sister     Heart Disease Sister     Obesity Sister     Cancer Sister     Heart Disease Sister     Obesity Sister     Alcohol Abuse Brother        PHYSICAL EXAM:  I performed this physical examination.     Vitals:  Patient Vitals for the past 24 hrs:   BP Temp Temp src Pulse Resp SpO2 Height Weight   10/19/22 0109 (!) 176/99 -- -- 89 16 100 % -- --   10/19/22 0034 (!) 158/97 -- -- 96 20 100 % -- --   10/19/22 0020 -- -- -- -- 25 -- -- --   10/19/22 0019 (!) 170/92 -- -- (!) 101 24 100 % -- --   10/18/22 2349 (!) 175/102 -- -- (!) 112 17 100 % -- --   10/18/22 2249 (!) 187/111 -- -- (!) 118 26 100 % -- --   10/18/22 2234 (!) 177/100 -- -- (!) 121 23 100 % -- --   10/18/22 2219 (!) 153/102 -- -- (!) 120 26 98 % -- --   10/18/22 2204 (!) 189/106 -- -- (!) 128 22 98 % -- --   10/18/22 2159 -- -- -- (!) 129 26 99 % -- --   10/18/22 2152 -- -- -- -- (!) 37 -- -- --   10/18/22 2147 (!) 207/123 97.3 °F (36.3 °C) Axillary (!) 141 28 97 % 5' 9\" (1.753 m) 227 lb (103 kg)     BiPAP    GEN:  Appearance:  Age appropriate WM in NAD . Appears comfortable on BiPAP  Level of Consciousness:  alert . Orientation:  full    HEENT: Sclera anicteric.  no conjunctival chemosis. moist mucus membranes. no specific or diagnostic oral lesions. NECK:  no signs of meningismus. Jugular veins are distended. Carotid pulses  2+.  no cervical lymphadenopathy. no thyromegaly. CHEST: Left IJ tunneled HD catheter    CV:  regular rhythm. normal S1 & S2.    no murmur. no rub.  no gallop. PULM:  Chest excursion is symmetric. Breath sounds are generally vesicular, diminished over the left base. Adventitious sounds:  crackles over both bases. AB:  Abdominal shape is obese. Bowel sounds are active. Generally soft to palpation. no tenderness is present. no involuntary guarding. no rebound guarding. EXTR:  Skin is warm. Capillary refill brisk. no specific or pathognomic rash. no clubbing. 1-2+ pitting edema of both legs  Bleeding right great toe and toenail with adjacent skin breakdown. No ulcer or draining absces.   Pulses barely palpable in the feet    LABS:  Lab Results   Component Value Date    WBC 16.5 (H) 10/18/2022    HGB 11.5 (L) 10/18/2022    HCT 37.2 (L) 10/18/2022    MCV 90.5 10/18/2022     10/18/2022     Lab Results   Component Value Date    CREATININE 9.6 (HH) 10/18/2022    BUN 92 (HH) 10/18/2022     (L) 10/18/2022    K 5.9 (H) 10/19/2022    CL 92 (L) 10/18/2022    CO2 17 (L) 10/18/2022     Lab Results   Component Value Date    ALT 12 10/18/2022    AST 16 10/18/2022    ALKPHOS 101 10/18/2022    BILITOT 0.4 10/18/2022     Lab Results   Component Value Date    CKTOTAL 45 05/25/2022    TROPONINI 0.11 (H) 10/18/2022     No results for input(s): PHART, ECH1VXV, PO2ART in the last 72 hours. IMAGING:  XR CHEST PORTABLE    Result Date: 10/18/2022  EXAMINATION: ONE XRAY VIEW OF THE CHEST 10/18/2022 10:13 pm COMPARISON: 10/02/2022. HISTORY: ORDERING SYSTEM PROVIDED HISTORY: cough, sob TECHNOLOGIST PROVIDED HISTORY: Reason for exam:->cough, sob Reason for Exam: cough, SOB FINDINGS: The heart size is enlarged but unchanged. The pulmonary vasculature is mildly congested. There is a moderate-sized left pleural effusion. Note is made of a TAVR. There is a left dialysis catheter. 1. Cardiomegaly with mild vascular congestion and moderate size left pleural effusion. I directly reviewed all recent imaging studies as well as pertinent prior studies. Radiology reports may or may not be available at the time of my review. EKG:  New and pertinent prior tracings were directly reviewed. My interpretation is as follows:  Sinus tachycardia. Probable first degree AV block. Baseline wander and artifact limits interpretation. Active Hospital Problems    Diagnosis Date Noted    Hypervolemia [E87.70] 10/19/2022     Priority: Medium    HFrEF (heart failure with reduced ejection fraction) ([facility] [I50.20] 09/24/2022     Priority: Medium    Type 2 diabetes mellitus with hyperglycemia ([facility] [E11.65] 06/27/2021    PAF (paroxysmal atrial fibrillation) ([facility] [I48.0]     ESRD (end stage renal disease) on dialysis ([facility] [N18.6, Z99.2] 11/22/2017    ZAINAB on CPAP [G47.33, Z99.89] 02/11/2016       ASSESSMENT & PLAN  ESRD   -  On HD qMWF. Missed Monday. Volume overloaded. Has a Left IJ TDC.  -  Continue home renal vitamin and calcium acetate.  -  Nephrology consulted and appreciated for inpatient dialysis needs. Patient has a longstanding h/o general noncompliance including with dialysis. Chronic combined sytolic/diastolic failure, CAD, PAF  -  LVEF 20-25% by Echo June ,2022.   Likely due to hypertensive and ischemic heart disease.    -  Continue home entresto, toprol XL, isordil, plavix, apixaban, pravastatin. Acute on Chronic Respiratory Failure, COPD  -  Acute component of respiratory failure appears due to volume overload. -  Titrate level of respiratory support according to patient's needs. Target goal SpO2 = 90-94%. Currently patient is requiring BiPAP support. Baseline home O2 at 3L per NC.    -  Continue home ICS/LABA/LAAC or equivalent. -  Known recurring left pleural effusion. DM2  -  diet controlled     DVT prophylaxis: SCDs. apixabn   Code Status:  Full  Disposition:  Inpatient w/ anticipate d/c back to home.     Jeff George MD MD

## 2022-10-19 NOTE — PROGRESS NOTES
10/19/22 0020   NIV Type   $NIV $Daily Charge   NIV Started/Stopped On   Equipment Type v60   Mode Bilevel   Mask Type Full face mask   Mask Size Large   Settings/Measurements   IPAP 16 cmH20   CPAP/EPAP 6 cmH2O   Vt (Measured) 732 mL   Rate Ordered 12   Resp 25   FiO2  (S)  40 %   Mask Leak (lpm) 44 lpm   SpO2 100   Alarm Settings   Alarms On Y   Low Pressure (cmH2O) 6 cmH2O   High Pressure (cmH2O) 20 cmH2O

## 2022-10-19 NOTE — CONSULTS
Nephrology Consult Note  [facility]. [employer]        Reason for Consult: ESKD    Outpatient HD unit: [facility]  Days of dialysis: MWF. Last HD was on 10/14/22. Duration of each treatment: 4H  Vascular access: [facility]  EDW: 103.5kg, last post weight though was 100.6kg    HISTORY OF PRESENT ILLNESS:      The patientis a 45 y.o. male with significant past medical history of CAD, arthritis, CHF, COPD, DM, HTN and GERD who presents with SOB after missing his dialysis on 10/17/22. He reports that his transport did not pick him up. Has known history of non-compliance.     Past Medical History:        Diagnosis Date    Ambulatory dysfunction     walker for long distances, SOB with distance    Aortic stenosis     echo 2017    Arthritis     hands and hips    Asthma     Bilateral hilar adenopathy syndrome 6/3/2013    CAD (coronary artery disease)     Dr. Castelan [facility]) 04/19/2019    EF= 43%    CHF (congestive heart failure) ([address]     Chronic pain     COPD (chronic obstructive pulmonary disease) ([address]     pulmonology Dr. Florencio Hernandez    Depression     Diabetes mellitus ([address]     borderline    Difficult intravenous access     Emphysema of lung ([address]     ESRD (end stage renal disease) on dialysis ([address]     MWF    Fear of needles     Gastric ulcer     GERD (gastroesophageal reflux disease)     Heart valve problem     bicuspic valve    Hemodialysis patient ([address]     History of spinal fracture     work incident    Hx of blood clots     Bilateral lower extremities; stents in place    Hyperlipidemia     Hypertension     MI (myocardial infarction) ([address] 2019    has had 9 MIs. 2019 was the last    Neuromuscular disorder ([address]     due to CVA    Numbness and tingling in left arm     from fistula    Pneumonia     PONV (postoperative nausea and vomiting)     Prolonged emergence from general anesthesia     States requires more medication than most people    Sleep apnea     Uses CPAP    Stroke (HCC)     7mm thalamic cva 2017 deficts left side, left side weakness    TIA (transient ischemic attack)     Unspecified diseases of blood and blood-forming organs        Past Surgical History:        Procedure Laterality Date    AORTIC VALVE REPLACEMENT N/A 10/15/2019    TRANSCATHETER AORTIC VALVE REPLACEMENT FEMORAL APPROACH performed by Reid Wise MD at [address] Right 7/2/2019    PERITONEAL DIALYSIS CATHETER REMOVAL performed by Raynelle Cushing, MD at [address]  2/29/2015    WN    CORONARY ANGIOPLASTY WITH STENT PLACEMENT  05/26/15    CYST REMOVAL  08/14/2013    EXCISION CYSTS, NECK X2 AND ABDOMINAL benign    DIAGNOSTIC CARDIAC CATH LAB PROCEDURE      DIALYSIS FISTULA CREATION Left 10/30/2017    LEFT BRACHIAL CEPHALIC FISTULA    DIALYSIS FISTULA CREATION Left 3/27/2019    LIGATION  AV FISTULA performed by Ekaterina Otoole MD at [id number] [place] [address], COLON, DIAGNOSTIC      IR TUNNELED CATHETER PLACEMENT GREATER THAN 5 YEARS  3/21/2022    IR TUNNELED CATHETER PLACEMENT GREATER THAN 5 YEARS 3/21/2022 [facility]    IR TUNNELED CATHETER PLACEMENT GREATER THAN 5 YEARS  4/21/2022    IR TUNNELED CATHETER PLACEMENT GREATER THAN 5 YEARS 4/21/2022 [facility]    IR TUNNELED CATHETER PLACEMENT GREATER THAN 5 YEARS  4/26/2022    IR TUNNELED CATHETER PLACEMENT GREATER THAN 5 YEARS 4/26/2022 [facility]    IR TUNNELED CATHETER PLACEMENT GREATER THAN 5 YEARS  6/23/2022    IR TUNNELED CATHETER PLACEMENT GREATER THAN 5 YEARS 6/23/2022 [facility]    OTHER SURGICAL HISTORY  02/01/2017    laparoscopic cholecystectomy with intraoperative cholangiogram    OTHER SURGICAL HISTORY  2018    PORT PLACEMENT  - vas cath    OTHER SURGICAL HISTORY Bilateral 06/26/2018    laprascopic peritoneal dialysis catheter placement    OTHER SURGICAL HISTORY Right 09/2018    peritoneal dialysis port placed on right side of abdomen    OTHER SURGICAL HISTORY 05/28/2019    PTA/Stenting R External Iliac artery    [place] LAP INSERTION TUNNELED INTRAPERITONEAL CATHETER N/A 9/21/2018    LAPAROSCOPIC PERITONEAL DIALYSIS CATHETER REPLACEMENT performed by Ruddy Gomez MD at [address] [place] [address] 2/24/2022    PERINEAL ABCESS DRAINAGE performed by Ruddy Gomez MD at [address] [place] [address] N/A 10/2/2022    THORACENTESIS ULTRASOUND performed by Jackie Palacios MD at [address] [place] [address]  01/06/2016    UPPER GASTROINTESTINAL ENDOSCOPY  01/29/2017    possible candida, otherwise normal appearing    VASCULAR SURGERY  aprx 2 years ago    2 stents placed, each side of groin       Current Medications:    No current facility-administered medications on file prior to encounter.      Current Outpatient Medications on File Prior to Encounter   Medication Sig Dispense Refill    sacubitril-valsartan (ENTRESTO) 24-26 MG per tablet Take 0.5 tablets by mouth 2 times daily 30 tablet 0    oxyCODONE-acetaminophen (PERCOCET) 7.5-325 MG per tablet Take 1 tablet by mouth every 6 hours as needed for Pain.      metoprolol succinate (TOPROL XL) 100 MG extended release tablet Take 1 tablet by mouth in the morning and at bedtime 30 tablet 3    cyclobenzaprine (FLEXERIL) 5 MG tablet Muscle spasms      traZODone (DESYREL) 100 MG tablet Take 100 mg by mouth nightly      QUEtiapine (SEROQUEL) 50 MG tablet TAKE 1 TABLET BY MOUTH EVERY EVENING 30 tablet 10    isosorbide dinitrate (ISORDIL) 20 MG tablet Take 1 tablet by mouth 3 times daily 90 tablet 3    clopidogrel (PLAVIX) 75 MG tablet Take 1 tablet by mouth daily 90 tablet 3    pantoprazole (PROTONIX) 40 MG tablet TAKE 1 TABLET BY MOUTH EVERY MORNING BEFORE BREAKFAST 30 tablet 10    docusate sodium (DOK) 100 MG capsule Take 1 capsule by mouth daily 30 capsule 5    LINZESS 145 MCG capsule TAKE 1 CAPSULE BY MOUTH IN THE MORNING BEFORE BREAKFAST (Patient not taking: Reported on 10/19/2022) 30 capsule 10    DULoxetine (CYMBALTA) 30 MG extended release capsule TAKE 1 CAPSULE BY MOUTH EVERY DAY 30 capsule 10    mineral oil liquid Take 30 mLs by mouth daily as needed for Constipation 300 mL 0    sennosides-docusate sodium (SENOKOT-S) 8.6-50 MG tablet Take 1 tablet by mouth daily 10 tablet 0    apixaban (ELIQUIS) 5 MG TABS tablet Take 1 tablet by mouth 2 times daily 60 tablet 1    pravastatin (PRAVACHOL) 40 MG tablet Take 1 tablet by mouth daily 90 tablet 3    Continuous Blood Gluc Sensor (DEXCOM G6 SENSOR) MISC Every 10 days 9 each 3    Continuous Blood Gluc Transmit (DEXCOM G6 TRANSMITTER) MISC 1 each by Does not apply route every 3 months 1 each 3    Continuous Blood Gluc  (DEXCOM G6 ) ADAM 1 each by Does not apply route Daily with lunch 1 each 0    B Complex-C-Folic Acid (VIRT-CAPS) 1 MG CAPS TAKE 1 CAPSULE BY MOUTH EVERY DAY 90 capsule 1    Calcium Acetate, Phos Binder, 667 MG CAPS TAKE 1 CAPSULE BY MOUTH THREE TIMES DAILY WITH MEALS 90 capsule 3    nitroGLYCERIN (NITROSTAT) 0.4 MG SL tablet DISSOLVE 1 TABLET UNDER THE TONGUE AS NEEDED FOR CHEST PAIN EVERY 5 MINUTES UP TO 3 TIMES. IF NO RELIEF CALL 911. 25 tablet 10    vitamin D (ERGOCALCIFEROL) 92519 units CAPS capsule TK 1 C PO WEEKLY  11    Tiotropium Bromide-Olodaterol (STIOLTO RESPIMAT) 2.5-2.5 MCG/ACT AERS Inhale 2 puffs into the lungs daily 2 Inhaler 0    Blood Glucose Monitoring Suppl ADAM USE AS DIRECTED. 1 Device 0    Alcohol Swabs PADS USE AS DIRECTED 300 each 3    albuterol sulfate  (90 Base) MCG/ACT inhaler Inhale 2 puffs into the lungs every 6 hours as needed for Wheezing 1 Inhaler 3    ipratropium-albuterol (DUONEB) 0.5-2.5 (3) MG/3ML SOLN nebulizer solution Inhale 3 mLs into the lungs every 6 hours as needed for Shortness of Breath 360 mL 1    calcium carbonate (TUMS) 500 MG chewable tablet Take 1 tablet by mouth 3 times daily as needed for Heartburn.          Allergies:  Morphine    Social History: Social History     Socioeconomic History    Marital status:      Spouse name: Not on file    Number of children: Not on file    Years of education: Not on file    Highest education level: Not on file   Occupational History    Not on file   Tobacco Use    Smoking status: Former     Packs/day: 0.50     Years: 33.00     Pack years: 16.50     Types: Cigarettes     Quit date: 2020     Years since quittin.4    Smokeless tobacco: Never    Tobacco comments:     [facility] quit 2021   Vaping Use    Vaping Use: Never used   Substance and Sexual Activity    Alcohol use: Not Currently     Alcohol/week: 0.0 standard drinks     Comment: occ    Drug use: No    Sexual activity: Yes     Partners: Female     Comment:    Other Topics Concern    Not on file   Social History Narrative    Not on file     Social Determinants of Health     Financial Resource Strain: Not on file   Food Insecurity: Not on file   Transportation Needs: Not on file   Physical Activity: Not on file   Stress: Not on file   Social Connections: Not on file   Intimate Partner Violence: Not on file   Housing Stability: Not on file       Family History:       Problem Relation Age of Onset    Diabetes Mother     Heart Disease Father     Kidney Disease Sister         stage 4-kidney failure    Cancer Sister     Heart Disease Sister     Obesity Sister     Cancer Sister     Heart Disease Sister     Obesity Sister     Alcohol Abuse Brother          REVIEW OF SYSTEMS:    Review of Systems   Constitutional: Negative. HENT: Negative. Respiratory:  Positive for shortness of breath. Cardiovascular:  Positive for leg swelling. Gastrointestinal: Negative. Musculoskeletal: Negative. Neurological: Negative. PHYSICAL EXAM:    Vitals:    BP (!) 165/81   Pulse 76   Temp 97.6 °F (36.4 °C) (Oral)   Resp 20   Ht 5' 9\" (1.753 m)   Wt 234 lb 6.4 oz (106.3 kg)   SpO2 100%   BMI 34.61 kg/m²   I/O last 3 completed shifts:   In: 240 [P.O.:240]  Out: 0   No intake/output data recorded. Physical Exam  Vitals reviewed. Constitutional:       General: He is not in acute distress. HENT:      Head: Normocephalic and atraumatic. Eyes:      General: No scleral icterus. Conjunctiva/sclera: Conjunctivae normal.   Cardiovascular:      Rate and Rhythm: Normal rate. Pulmonary:      Effort: Respiratory distress present. Abdominal:      General: Bowel sounds are normal. There is no distension. Tenderness: There is no abdominal tenderness. Musculoskeletal:      Right lower leg: Edema present. Left lower leg: Edema present. Neurological:      Mental Status: He is alert. DATA:    Recent Labs     10/18/22  2158   WBC 16.5*   HGB 11.5*   HCT 37.2*   MCV 90.5        Recent Labs     10/18/22  2158 10/19/22  0001   *  --    K 5.8* 5.9*   CL 92*  --    CO2 17*  --    GLUCOSE 238*  --    BUN 92*  --    CREATININE 9.6*  --    LABGLOM 6*  --            IMPRESSION/RECOMMENDATIONS:      ESKD. - On HD MWF at [facility]. - Access: [facility].  - Prior TW of 103.5kg, last post weight though was 100.6kg.  - Missed HD on 10/17/22. HD today and tomorrow. Acute Hypoxic Respiratory Failure. - From fluid overload. - Should improve with fluid removal with HD.  - On supplemental O2. Hyperkalemia.  - From missed dialysis. - Dialyze against low K+ bath, low K+ diet. Anemia.  - Hgb 11.5g/dL, hold DAVON at this time. Hypertension.  - Continue home meds. - Fluid removal with HD. Thank you for allowing me to participate in the care of this patient. Please do not hesitate to contact me at [phone] with questions. Nicky Mendoza MD, MD  10/19/2022  [facility] [place]

## 2022-10-19 NOTE — PROGRESS NOTES
10/18/22 5216   NIV Type   $NIV $Daily Charge   NIV Started/Stopped On   Equipment Type v60   Mode Bilevel   Mask Type Full face mask   Mask Size Large   Settings/Measurements   IPAP 16 cmH20   CPAP/EPAP 6 cmH2O   Vt (Measured) 525 mL   Rate Ordered 12   Resp (!) 37   FiO2  50 %   Mask Leak (lpm) 12 lpm   SpO2 97   Patient's Home Machine No   Alarm Settings   Alarms On Y   Low Pressure (cmH2O) 6 cmH2O   High Pressure (cmH2O) 20 cmH2O

## 2022-10-19 NOTE — PROGRESS NOTES
Patient's EF (Ejection Fraction) is less than 40%    Heart Failure Medications:  Diuretics[de-identified] None    (One of the following REQUIRED for EF </= 40%/SYSTOLIC FAILURE but MAY be used in EF% >40%/DIASTOLIC FAILURE)        ACE[de-identified] None        ARB[de-identified] None         ARNI[de-identified] Sacubitril/Valsartan-Entresto    (Beta Blockers)  NON- Evidenced Based Beta Blocker (for EF% >40%/DIASTOLIC FAILURE): None    Evidenced Based Beta Blocker::(REQUIRED for EF% <40%/SYSTOLIC FAILURE) Metoprolol SUCCinate- Toprol XL  . .................................................................................................................................................. Patient's weights and intake/output reviewed: Yes    Patient's Last Weight: 234.4    obtained by . Difference of 0 lbs less than last documented weight.       Intake/Output Summary (Last 24 hours) at 10/19/2022 0641  Last data filed at 10/19/2022 0535  Gross per 24 hour   Intake 240 ml   Output 0 ml   Net 240 ml       Education Booklet Provided: no    Comorbidities Reviewed Yes    Patient has a past medical history of Ambulatory dysfunction, Aortic stenosis, Arthritis, Asthma, Bilateral hilar adenopathy syndrome, CAD (coronary artery disease), Cardiomyopathy ([address] CHF (congestive heart failure) ([address] Chronic pain, COPD (chronic obstructive pulmonary disease) ([address] Depression, Diabetes mellitus ([address] Difficult intravenous access, Emphysema of lung ([address] ESRD (end stage renal disease) on dialysis ([address] Fear of needles, Gastric ulcer, GERD (gastroesophageal reflux disease), Heart valve problem, Hemodialysis patient ([address] History of spinal fracture, Hx of blood clots, Hyperlipidemia, Hypertension, MI (myocardial infarction) ([address] Neuromuscular disorder ([address] Numbness and tingling in left arm, Pneumonia, PONV (postoperative nausea and vomiting), Prolonged emergence from general anesthesia, Sleep apnea, Stroke ([address] TIA (transient ischemic attack), and Unspecified diseases of blood and blood-forming organs. >>For CHF and Comorbidity documentation on Education Time and Topics, please see Education Tab    Progressive Mobility Assessment:  What is this patient's Current Level of Mobility?: Ambulatory- with Assistance  How was this patient Mobilized today?: Edge of Bed, Up to Chair, Bedside Commode, Up in Room, Up in [place], Unable to Mobilize, and Patient Refuses to Mobilize, ambulated    ft                 With Whom? Nurse, PCA, PT, and OT                 Level of Difficulty/Assistance: 1x Assist     Pt resting in bed at this time on BiPAP. Pt denies shortness of breath. Pt without lower extremity edema.      Patient and/or Family's stated Goal of Care this Admission: reduce shortness of breath, increase activity tolerance, better understand heart failure and disease management, be more comfortable, and reduce lower extremity edema prior to discharge        :

## 2022-10-19 NOTE — PROGRESS NOTES
10/19/22 0718   NIV Type   Skin Assessment Clean, dry, & intact   Skin Protection for O2 Device Yes   Location Nose   NIV Started/Stopped On   Equipment Type V60   Mode Bilevel   Mask Type Full face mask   Mask Size Large   Settings/Measurements   PIP Observed 17 cm H20   IPAP 16 cmH20   CPAP/EPAP 6 cmH2O   Vt (Measured) 726 mL   Rate Ordered 12   Resp 20   FiO2  35 %   Minute Volume (L/min) 9.8 Liters   Mask Leak (lpm) 57 lpm   Comfort Level Good   Using Accessory Muscles No   SpO2 100   Breath Sounds   Right Upper Lobe Clear;Diminished   Right Middle Lobe Diminished   Right Lower Lobe Diminished   Left Upper Lobe Diminished   Left Lower Lobe Diminished   Alarm Settings   Alarms On Y   Low Pressure (cmH2O) 5 cmH2O   High Pressure (cmH2O) 20 cmH2O   RR Low (bpm) 6   RR High (bpm) 45 br/min

## 2022-10-19 NOTE — PROGRESS NOTES
RN aware of hemodalysis orders for MWF. Nephrologist Dr. Telly Camejo recommended dialysis today (10/19) and tomorrow (10/20) as pt has not had dialysis since Friday 10/14. RN attempted to reach dialysis multiple times via phone. RN walked down to dialysis and the rooms were empty with no dialysis nurses. C4 charge RN called down to ICU to attempt to reach a dialysis nurse and ICU informed RN that there were no dialysis nurses in the ICU. No orders placed for Thursday (10/20) hemodialysis. [phone]   On call nephrologist Dr. Ino Arevalo made aware of situation via [employer]. [address] [facility]  Dr. Ino Arevalo relayed via secure message that Dr. Telly Camejo was looking into the matter. 1900  Pt transported to dialysis, [facility] made aware.

## 2022-10-19 NOTE — ED PROVIDER NOTES
[facility]  ED  EMERGENCY DEPARTMENT ENCOUNTER      Pt Name: Jayesh Bravo  MRN: [id number]  Maykelgfsuman 1970  Date of evaluation: 10/18/2022  Provider: Sami Sanchez MD    CHIEF COMPLAINT       Chief Complaint   Patient presents with    Shortness of Breath     Pt arrives from home via EMS for SOB. 70's on 2L baseline at home. Squad placed him on CPAP. Missed dialysis appt on Monday. Chest Pain     States it is a pressure 8/10         HISTORY OF PRESENT ILLNESS   (Location/Symptom, Timing/Onset, Context/Setting, Quality, Duration, Modifying Factors, Severity)  Note limiting factors. Jayesh Bravo is a 45 y.o. male with past medical history of multiple medical comorbidities including hypertension, diabetes, peripheral vascular disease, end-stage renal disease on hemodialysis with noncompliance and frequent visits to the emergency department here today for shortness of breath. Patient presented to the emergency department today complaining of shortness of breath. Notes he felt he could not breathe. States he missed his dialysis yesterday because he did not have a ride to take him there and was last dialyzed on the preceding Friday. He notes he is also not been taking his blood pressure medication. Since that time as well. Notes no significant cough or fever. Notes a substernal chest pressure. When EMS arrived he had oxygen saturations in the 70s on his 2 L baseline nasal cannula was placed on CPAP. He states this is starting to make him feel better. hospitals    Nursing Notes were reviewed. REVIEW OF SYSTEMS    (2-9 systems for level 4, 10 or more for level 5)     Review of Systems    Please see HPI for pertinent positive and negative review of system findings. A full 10 system ROS was performed and otherwise negative.         PAST MEDICAL HISTORY     Past Medical History:   Diagnosis Date    Ambulatory dysfunction     walker for long distances, SOB with distance    Aortic stenosis     echo 2017    Arthritis     hands and hips    Asthma     Bilateral hilar adenopathy syndrome 6/3/2013    CAD (coronary artery disease)     Dr. Mikayla Gooden [facility]) 04/19/2019    EF= 43%    CHF (congestive heart failure) ([address]     Chronic pain     COPD (chronic obstructive pulmonary disease) ([address]     pulmonology Dr. Tapia Montrose    Depression     Diabetes mellitus ([address]     borderline    Difficult intravenous access     Emphysema of lung ([address]     ESRD (end stage renal disease) on dialysis ([address]     MWF    Fear of needles     Gastric ulcer     GERD (gastroesophageal reflux disease)     Heart valve problem     bicuspic valve    Hemodialysis patient ([address]     History of spinal fracture     work incident    Hx of blood clots     Bilateral lower extremities; stents in place    Hyperlipidemia     Hypertension     MI (myocardial infarction) ([address] 2019    has had 9 MIs. 2019 was the last    Neuromuscular disorder ([address]     due to CVA    Numbness and tingling in left arm     from fistula    Pneumonia     PONV (postoperative nausea and vomiting)     Prolonged emergence from general anesthesia     States requires more medication than most people    Sleep apnea     Uses CPAP    Stroke ([address]     7mm thalamic cva 2017 deficts left side, left side weakness    TIA (transient ischemic attack)     Unspecified diseases of blood and blood-forming organs          SURGICAL HISTORY       Past Surgical History:   Procedure Laterality Date    AORTIC VALVE REPLACEMENT N/A 10/15/2019    TRANSCATHETER AORTIC VALVE REPLACEMENT FEMORAL APPROACH performed by Ramakrishna Manning MD at [facility] Right 7/2/2019    PERITONEAL DIALYSIS CATHETER REMOVAL performed by aDisy Keys MD at [address] [place] [address] Road  2/29/2015    [facility]    CORONARY ANGIOPLASTY WITH STENT PLACEMENT  05/26/15    CYST REMOVAL  08/14/2013    EXCISION CYSTS, NECK X2 AND ABDOMINAL benign    DIAGNOSTIC CARDIAC CATH LAB PROCEDURE      DIALYSIS FISTULA CREATION Left 10/30/2017    LEFT BRACHIAL CEPHALIC FISTULA    DIALYSIS FISTULA CREATION Left 3/27/2019    LIGATION  AV FISTULA performed by Dion Lee MD at [address] [facility], COLON, DIAGNOSTIC      IR TUNNELED CATHETER PLACEMENT GREATER THAN 5 YEARS  3/21/2022    IR TUNNELED CATHETER PLACEMENT GREATER THAN 5 YEARS 3/21/2022 [facility]    IR TUNNELED CATHETER PLACEMENT GREATER THAN 5 YEARS  4/21/2022    IR TUNNELED CATHETER PLACEMENT GREATER THAN 5 YEARS 4/21/2022 [facility]    IR TUNNELED CATHETER PLACEMENT GREATER THAN 5 YEARS  4/26/2022    IR TUNNELED CATHETER PLACEMENT GREATER THAN 5 YEARS 4/26/2022 [facility]    IR TUNNELED CATHETER PLACEMENT GREATER THAN 5 YEARS  6/23/2022    IR TUNNELED CATHETER PLACEMENT GREATER THAN 5 YEARS 6/23/2022 [facility]    OTHER SURGICAL HISTORY  02/01/2017    laparoscopic cholecystectomy with intraoperative cholangiogram    OTHER SURGICAL HISTORY  2018    PORT PLACEMENT  - vas cath    OTHER SURGICAL HISTORY Bilateral 06/26/2018    laprascopic peritoneal dialysis catheter placement    OTHER SURGICAL HISTORY Right 09/2018    peritoneal dialysis port placed on right side of abdomen    OTHER SURGICAL HISTORY  05/28/2019    PTA/Stenting R External Iliac artery    DC LAP INSERTION TUNNELED INTRAPERITONEAL CATHETER N/A 9/21/2018    LAPAROSCOPIC PERITONEAL DIALYSIS CATHETER REPLACEMENT performed by Hakan Izaguirre MD at [address] [place] [address] 2/24/2022    PERINEAL ABCESS DRAINAGE performed by Hakan Izaguirre MD at [address] [place] [address] N/A 10/2/2022    THORACENTESIS ULTRASOUND performed by Cal Petit MD at [address] [place] [address]  01/06/2016    UPPER GASTROINTESTINAL ENDOSCOPY  01/29/2017    possible candida, otherwise normal appearing    VASCULAR SURGERY  aprx 2 years ago    2 stents placed, each side of groin         CURRENT MEDICATIONS       Previous Medications    ALBUTEROL SULFATE  (90 BASE) MCG/ACT INHALER    Inhale 2 puffs into the lungs every 6 hours as needed for Wheezing    ALCOHOL SWABS PADS    USE AS DIRECTED    APIXABAN (ELIQUIS) 5 MG TABS TABLET    Take 1 tablet by mouth 2 times daily    B COMPLEX-C-FOLIC ACID (VIRT-CAPS) 1 MG CAPS    TAKE 1 CAPSULE BY MOUTH EVERY DAY    BLOOD GLUCOSE MONITORING SUPPL ADAM    USE AS DIRECTED. CALCIUM ACETATE, PHOS BINDER, 667 MG CAPS    TAKE 1 CAPSULE BY MOUTH THREE TIMES DAILY WITH MEALS    CALCIUM CARBONATE (TUMS) 500 MG CHEWABLE TABLET    Take 1 tablet by mouth 3 times daily as needed for Heartburn. CLOPIDOGREL (PLAVIX) 75 MG TABLET    Take 1 tablet by mouth daily    CONTINUOUS BLOOD GLUC  (DEXCOM G6 ) ADAM    1 each by Does not apply route Daily with lunch    CONTINUOUS BLOOD GLUC SENSOR (DEXCOM G6 SENSOR) MISC    Every 10 days    CONTINUOUS BLOOD GLUC TRANSMIT (DEXCOM G6 TRANSMITTER) MISC    1 each by Does not apply route every 3 months    CYCLOBENZAPRINE (FLEXERIL) 5 MG TABLET    Muscle spasms    DOCUSATE SODIUM (DOK) 100 MG CAPSULE    Take 1 capsule by mouth daily    DULOXETINE (CYMBALTA) 30 MG EXTENDED RELEASE CAPSULE    TAKE 1 CAPSULE BY MOUTH EVERY DAY    IPRATROPIUM-ALBUTEROL (DUONEB) 0.5-2.5 (3) MG/3ML SOLN NEBULIZER SOLUTION    Inhale 3 mLs into the lungs every 6 hours as needed for Shortness of Breath    ISOSORBIDE DINITRATE (ISORDIL) 20 MG TABLET    Take 1 tablet by mouth 3 times daily    LINZESS 145 MCG CAPSULE    TAKE 1 CAPSULE BY MOUTH IN THE MORNING BEFORE BREAKFAST    METOPROLOL SUCCINATE (TOPROL XL) 100 MG EXTENDED RELEASE TABLET    Take 1 tablet by mouth in the morning and at bedtime    MINERAL OIL LIQUID    Take 30 mLs by mouth daily as needed for Constipation    NITROGLYCERIN (NITROSTAT) 0.4 MG SL TABLET    DISSOLVE 1 TABLET UNDER THE TONGUE AS NEEDED FOR CHEST PAIN EVERY 5 MINUTES UP TO 3 TIMES.  IF NO RELIEF CALL 911.    OXYCODONE-ACETAMINOPHEN (PERCOCET) 7.5-325 MG PER TABLET    Take 1 tablet by mouth every 6 hours as needed for Pain.     PANTOPRAZOLE (PROTONIX) 40 MG TABLET    TAKE 1 TABLET BY MOUTH EVERY MORNING BEFORE BREAKFAST    PRAVASTATIN (PRAVACHOL) 40 MG TABLET    Take 1 tablet by mouth daily    QUETIAPINE (SEROQUEL) 50 MG TABLET    TAKE 1 TABLET BY MOUTH EVERY EVENING    SACUBITRIL-VALSARTAN (ENTRESTO) 24-26 MG PER TABLET    Take 0.5 tablets by mouth 2 times daily    SENNOSIDES-DOCUSATE SODIUM (SENOKOT-S) 8.6-50 MG TABLET    Take 1 tablet by mouth daily    TIOTROPIUM BROMIDE-OLODATEROL (STIOLTO RESPIMAT) 2.5-2.5 MCG/ACT AERS    Inhale 2 puffs into the lungs daily    TRAZODONE (DESYREL) 100 MG TABLET    Take 100 mg by mouth nightly    VITAMIN D (ERGOCALCIFEROL) 67317 UNITS CAPS CAPSULE    TK 1 C PO WEEKLY       ALLERGIES     Morphine    FAMILY HISTORY       Family History   Problem Relation Age of Onset    Diabetes Mother     Heart Disease Father     Kidney Disease Sister         stage 4-kidney failure    Cancer Sister     Heart Disease Sister     Obesity Sister     Cancer Sister     Heart Disease Sister     Obesity Sister     Alcohol Abuse Brother           SOCIAL HISTORY       Social History     Socioeconomic History    Marital status:      Spouse name: None    Number of children: None    Years of education: None    Highest education level: None   Tobacco Use    Smoking status: Former     Packs/day: 0.50     Years: 33.00     Pack years: 16.50     Types: Cigarettes     Quit date: 2020     Years since quittin.4    Smokeless tobacco: Never    Tobacco comments:     [facility] quit 2021   Vaping Use    Vaping Use: Never used   Substance and Sexual Activity    Alcohol use: Not Currently     Alcohol/week: 0.0 standard drinks     Comment: occ    Drug use: No    Sexual activity: Yes     Partners: Female     Comment:        SCREENINGS    Robert Coma Scale  Eye Opening: Spontaneous  Best Verbal Response: Oriented  Best Motor Response: Obeys commands  [place] Coma Scale Score: 15          PHYSICAL EXAM    (up to 7 for level 4, 8 or more for level 5)     ED Triage Vitals [10/18/22 2147]   BP Temp Temp Source Heart Rate Resp SpO2 Height Weight   (!) 207/123 97.3 °F (36.3 °C) Axillary (!) 141 28 97 % 5' 9\" (1.753 m) 227 lb (103 kg)       Physical Exam    General appearance:  Cooperative. Increased work of breathing noted. Skin:  Warm. Dry. Eye:  Extraocular movements intact. Ears, nose, mouth and throat:  Oral mucosa moist,  Neck:  Trachea midline. Heart: Tachycardic but regular  Perfusion:  intact  Respiratory: Increased work of breathing with crackles in the bilateral bases  Abdominal:   Non distended. Nontender  Neurological:  Alert and oriented x 3. Moves all extremities spontaneously  Musculoskeletal:   Normal ROM, no deformities.   Bilateral lower extremity pitting edema          Psychiatric:  Normal mood      DIAGNOSTIC RESULTS       Labs Reviewed   CBC WITH AUTO DIFFERENTIAL - Abnormal; Notable for the following components:       Result Value    WBC 16.5 (*)     RBC 4.11 (*)     Hemoglobin 11.5 (*)     Hematocrit 37.2 (*)     RDW 16.7 (*)     Neutrophils Absolute 14.6 (*)     Lymphocytes Absolute 0.9 (*)     All other components within normal limits   COMPREHENSIVE METABOLIC PANEL W/ REFLEX TO MG FOR LOW K - Abnormal; Notable for the following components:    Sodium 132 (*)     Potassium reflex Magnesium 5.8 (*)     Chloride 92 (*)     CO2 17 (*)     Anion Gap 23 (*)     Glucose 238 (*)     BUN 92 (*)     Creatinine 9.6 (*)     Est, Glom Filt Rate 6 (*)     Albumin/Globulin Ratio 1.0 (*)     All other components within normal limits    Narrative:     Nishi Huff tel. [phone],  Chemistry results called to and read back by Zonia Izaguirre RN, 10/18/2022 23:01,  by Pastor Peterson   BLOOD GAS, VENOUS - Abnormal; Notable for the following components:    pH, Blade 7.280 (*)     pO2, Blade 86.3 (*)     HCO3, Venous 19.0 (*)     Base Excess, Blade -7.3 (*)     Carboxyhemoglobin 4.1 (*)     All other components within normal limits   BRAIN NATRIURETIC PEPTIDE - Abnormal; Notable for the following components:    Pro-BNP >70,000 (*)     All other components within normal limits    Narrative:     Spencer Tamayo tel. [phone],  Chemistry results called to and read back by Cam Fisher RN, 10/18/2022 23:01,  by Ara Levy   TROPONIN - Abnormal; Notable for the following components:    Troponin 0.11 (*)     All other components within normal limits    Narrative:     [address],  Chemistry results called to and read back by Cam Fisher RN, 10/18/2022 23:01,  by Ara Levy   COVID-19, RAPID   PROTIME-INR   APTT   LACTIC ACID   POTASSIUM       Interpretation per the Radiologist below, if obtained/available at the time of this note:    XR CHEST PORTABLE   Final Result   1. Cardiomegaly with mild vascular congestion and moderate size left pleural   effusion. All other labs/imaging were within normal range or not returned as of this dictation. EMERGENCY DEPARTMENT COURSE and DIFFERENTIAL DIAGNOSIS/MDM:   Vitals:    Vitals:    10/18/22 2234 10/18/22 2249 10/18/22 2349 10/19/22 0020   BP: (!) 177/100 (!) 187/111 (!) 175/102    Pulse: (!) 121 (!) 118 (!) 112    Resp: 23 26 17 25   Temp:       TempSrc:       SpO2: 100% 100% 100%    Weight:       Height:           EKG: Undetermined rhythm likely sinus tachycardia with low voltage. Nonspecific T wave abnormalities appreciated. No ST elevation. Patient presents emergency department today complaining of shortness of breath. Was tachypneic with increased work of breathing and reportedly hypoxic per EMS. Placed on BiPAP here and is much more comfortable. Multiple frequent present similar presentations due to medication noncompliance. Was given his home blood pressure medications.   Laboratory studies performed show known chronic end-stage kidney disease however with worsened elevated BUN also with increased anion gap and CO2 of 17.  pH 7.2. Chest x-ray with signs of fluid overload. Overall picture consistent with end-stage renal disease with noncompliance and fluid overload with acidosis. Case discussed with nephrology who plan to dialyze the patient but at this time there is no dialysis nurse covering for overnight emergent dialysis. Patient does make urine and nephrology recommended large dose of IV Lasix with plans to dialyze the patient first thing in the morning. He is otherwise resting comfortably on his BiPAP    Idris PRITCHETT M.D., am the primary clinician of record. MDM      CONSULTS     IP CONSULT TO NEPHROLOGY  IP CONSULT TO HOSPITALIST    Critical Care:     CRITICAL CARE TIME    I personally saw the patient and independently provided 30 minutes of non-concurrent critical care out of the total shared critical care time provided, excluding separately reportable procedures. There was a high probability of clinically significant/life threatening deterioration in the patient's condition which required my urgent intervention. This time was spent reviewing the patient chart, interpreting diagnostic/laboratory data, management of BiPAP settings, administration of supplemental oxygen for hypoxic respiratory failure      REASSESSMENT          PROCEDURE     Unless otherwise noted below, none     Procedures      FINAL IMPRESSION      1. Acute respiratory failure with hypoxia ([address]    2. Metabolic acidosis    3. ESRD (end stage renal disease) ([address]    4. Hypervolemia, unspecified hypervolemia type    5. Dialysis patient, noncompliant [facility])            DISPOSITION/PLAN   DISPOSITION Decision To Admit 10/19/2022 12:20:04 AM        PATIENT REFERRED TO:  No follow-up provider specified.     DISCHARGE MEDICATIONS:  New Prescriptions    No medications on file     Controlled Substances Monitoring:     RX Monitoring 8/4/2017   Attestation The Prescription Monitoring Report for this patient was reviewed today. Periodic Controlled Substance Monitoring No signs of potential drug abuse or diversion identified.        (Please note that portions of this note were completed with a voice recognition program.  Efforts were made to edit the dictations but occasionally words are mis-transcribed.)    Velma Hess MD (electronically signed)  Attending Emergency Physician            Julia Olmos MD  10/20/22 4421

## 2022-10-19 NOTE — PROGRESS NOTES
4 Eyes Skin Assessment     The patient is being assess for  Admission    I agree that 2 RN's have performed a thorough Head to Toe Skin Assessment on the patient. ALL assessment sites listed below have been assessed. Areas assessed by both nurses:     [x]   Head, Face, and Ears   [x]   Shoulders, Back, and Chest  [x]   Arms, Elbows, and Hands   [x]   Coccyx, Sacrum, and Ischum  [x]   Legs, Feet, and Heels        Does the Patient have Skin Breakdown?   Yes a wound was noted on the Admission Assessment and an WOUND LDA was Initiated documentation include the Liberty-wound, Wound Assessment, Measurements, Dressing Treatment, Drainage, and Color\",         Isaac Prevention initiated:  No   Wound Care Orders initiated:  No      WOC nurse consulted for Pressure Injury (Stage 3,4, Unstageable, DTI, NWPT, and Complex wounds):  No      Nurse 1 eSignature: Electronically signed by Yoly Elizalde RN on 10/19/22 at 6:56 AM EDT    **SHARE this note so that the co-signing nurse is able to place an eSignature**    Nurse 2 eSignature: Electronically signed by Saranya Gross RN on 10/19/22 at 6:58 AM EDT

## 2022-10-19 NOTE — ED NOTES
PS Patients Nephrology office @[phone]  Per   RE acidosis, resp failure   returned page @[phone]       Elsa Calles  10/18/22 5452

## 2022-10-19 NOTE — CARE COORDINATION
CASE MANAGEMENT INITIAL ASSESSMENT      Reviewed chart and completed assessment with patient:  Family present:   Explained Case Management role/services. to patient    Primary contact information:see below    Health Care Decision Maker :   Primary Decision Maker: Crystal Ann - Spouse - [phone]    Secondary Decision Maker: Melquiades Carcamo - Brother/Sister - [phone]          Can this person be reached and be able to respond quickly, such as within a few minutes or hours? Yes      Admit date/status:inpatient  Diagnosis:Hypervolemia    Is this a Readmission?:  Yes      Insurance:[employer] Medicare    Precert required for SNF: Yes       3 night stay required: No    Living arrangements, Adls, care needs, prior to admission:Lives at home with wife in mobile home    [address] at home:  Walker__Cane__RTS__ BSC__Shower Chair_x_  02_x [employer] 2 to 3  liters HHN__ CPAP_x_  BiPap__  Hospital Bed__ W/C___ Other_____    Services in the home and/or outpatient, prior to admission:was referred to [facility] last admission and he states they never came and said they had no staff    Current [address] listed but patient states he had a \"falling out\" with him and needs new PCP                                Medications:Has coverage Prescription coverage? Yes Will pt require financial assistance with medications No     Transportation needs: tbd will likely need ambulance even if he changes his mind about SNF and goes home     Dialysis Facility (if applicable)   [facility]  Dialysis Schedule:[facility]      PT/OT recs:Pending    Hospital Exemption Notification (HEN):N/A     Barriers to discharge:None identified    Plan/comments:Patient stated he readmitted because he missed HD. He said his usual transport did not show up for 5 days. This was arranged with [facility] apparently according to patient. Call placed to [facility] to investigate.  Patient also states he needs rehab and to go live somewhere and is agreeable to [facility]. However he states his wife does not want to ever be alone and throws a fit every time he is set up to go to rehab. He inquired if [facility] can take her too. Referral initiated to [facility] at [facility]. Need PT/OT to eval and if SNF recommended will need [facility] pre-cert. [facility] states they can accept medically . They will work on transport for his HD and investigate possibility of taking patient's wife also but she cannot promise this will be feasible.  PT/OT pending,      [facility] on chart for MD signature

## 2022-10-20 VITALS
OXYGEN SATURATION: 97 % | TEMPERATURE: 97.9 F | HEART RATE: 62 BPM | SYSTOLIC BLOOD PRESSURE: 122 MMHG | RESPIRATION RATE: 16 BRPM | BODY MASS INDEX: 39.38 KG/M2 | DIASTOLIC BLOOD PRESSURE: 72 MMHG | WEIGHT: 265.88 LBS | HEIGHT: 69 IN

## 2022-10-20 LAB
ANION GAP SERPL CALCULATED.3IONS-SCNC: 16 MMOL/L (ref 3–16)
BUN BLDV-MCNC: 74 MG/DL (ref 7–20)
CALCIUM SERPL-MCNC: 8.1 MG/DL (ref 8.3–10.6)
CHLORIDE BLD-SCNC: 92 MMOL/L (ref 99–110)
CO2: 23 MMOL/L (ref 21–32)
CREAT SERPL-MCNC: 7 MG/DL (ref 0.9–1.3)
EKG ATRIAL RATE: 129 BPM
EKG DIAGNOSIS: NORMAL
EKG P AXIS: 31 DEGREES
EKG Q-T INTERVAL: 314 MS
EKG QRS DURATION: 102 MS
EKG QTC CALCULATION (BAZETT): 460 MS
EKG R AXIS: 19 DEGREES
EKG T AXIS: 55 DEGREES
EKG VENTRICULAR RATE: 129 BPM
GFR SERPL CREATININE-BSD FRML MDRD: 9 ML/MIN/{1.73_M2}
GLUCOSE BLD-MCNC: 159 MG/DL (ref 70–99)
GLUCOSE BLD-MCNC: 165 MG/DL (ref 70–99)
GLUCOSE BLD-MCNC: 200 MG/DL (ref 70–99)
HCT VFR BLD CALC: 30 % (ref 40.5–52.5)
HEMOGLOBIN: 9.7 G/DL (ref 13.5–17.5)
MCH RBC QN AUTO: 29 PG (ref 26–34)
MCHC RBC AUTO-ENTMCNC: 32.3 G/DL (ref 31–36)
MCV RBC AUTO: 89.6 FL (ref 80–100)
PDW BLD-RTO: 16.6 % (ref 12.4–15.4)
PERFORMED ON: ABNORMAL
PERFORMED ON: ABNORMAL
PLATELET # BLD: 223 K/UL (ref 135–450)
PMV BLD AUTO: 7.8 FL (ref 5–10.5)
POTASSIUM REFLEX MAGNESIUM: 5.4 MMOL/L (ref 3.5–5.1)
RBC # BLD: 3.35 M/UL (ref 4.2–5.9)
SODIUM BLD-SCNC: 131 MMOL/L (ref 136–145)
WBC # BLD: 8 K/UL (ref 4–11)

## 2022-10-20 PROCEDURE — 97166 OT EVAL MOD COMPLEX 45 MIN: CPT

## 2022-10-20 PROCEDURE — 2700000000 HC OXYGEN THERAPY PER DAY

## 2022-10-20 PROCEDURE — 85027 COMPLETE CBC AUTOMATED: CPT

## 2022-10-20 PROCEDURE — 97161 PT EVAL LOW COMPLEX 20 MIN: CPT

## 2022-10-20 PROCEDURE — 97530 THERAPEUTIC ACTIVITIES: CPT

## 2022-10-20 PROCEDURE — 2500000003 HC RX 250 WO HCPCS: Performed by: INTERNAL MEDICINE

## 2022-10-20 PROCEDURE — 36415 COLL VENOUS BLD VENIPUNCTURE: CPT

## 2022-10-20 PROCEDURE — 6370000000 HC RX 637 (ALT 250 FOR IP): Performed by: INTERNAL MEDICINE

## 2022-10-20 PROCEDURE — 94761 N-INVAS EAR/PLS OXIMETRY MLT: CPT

## 2022-10-20 PROCEDURE — 94660 CPAP INITIATION&MGMT: CPT

## 2022-10-20 PROCEDURE — 80048 BASIC METABOLIC PNL TOTAL CA: CPT

## 2022-10-20 PROCEDURE — 93010 ELECTROCARDIOGRAM REPORT: CPT | Performed by: INTERNAL MEDICINE

## 2022-10-20 RX ORDER — OXYCODONE AND ACETAMINOPHEN 7.5; 325 MG/1; MG/1
1 TABLET ORAL EVERY 6 HOURS PRN
Qty: 30 TABLET | Refills: 0 | Status: SHIPPED | OUTPATIENT
Start: 2022-10-20 | End: 2022-10-30

## 2022-10-20 RX ORDER — GABAPENTIN 100 MG/1
200 CAPSULE ORAL 3 TIMES DAILY PRN
Status: DISCONTINUED | OUTPATIENT
Start: 2022-10-20 | End: 2022-10-20 | Stop reason: HOSPADM

## 2022-10-20 RX ORDER — GABAPENTIN 100 MG/1
200 CAPSULE ORAL 3 TIMES DAILY PRN
Qty: 60 CAPSULE | Refills: 0 | Status: SHIPPED | OUTPATIENT
Start: 2022-10-20 | End: 2022-10-30

## 2022-10-20 RX ADMIN — DULOXETINE HYDROCHLORIDE 30 MG: 30 CAPSULE, DELAYED RELEASE ORAL at 09:26

## 2022-10-20 RX ADMIN — PRAVASTATIN SODIUM 40 MG: 40 TABLET ORAL at 09:26

## 2022-10-20 RX ADMIN — CALCIUM ACETATE 667 MG: 667 CAPSULE ORAL at 09:25

## 2022-10-20 RX ADMIN — APIXABAN 2.5 MG: 2.5 TABLET, FILM COATED ORAL at 09:26

## 2022-10-20 RX ADMIN — ISOSORBIDE DINITRATE 20 MG: 20 TABLET ORAL at 09:26

## 2022-10-20 RX ADMIN — METOPROLOL SUCCINATE 100 MG: 50 TABLET, EXTENDED RELEASE ORAL at 09:26

## 2022-10-20 RX ADMIN — SACUBITRIL AND VALSARTAN 0.5 TABLET: 24; 26 TABLET, FILM COATED ORAL at 09:26

## 2022-10-20 RX ADMIN — PANTOPRAZOLE SODIUM 40 MG: 40 TABLET, DELAYED RELEASE ORAL at 09:26

## 2022-10-20 RX ADMIN — CALCIUM ACETATE 667 MG: 667 CAPSULE ORAL at 11:50

## 2022-10-20 RX ADMIN — CLOPIDOGREL BISULFATE 75 MG: 75 TABLET ORAL at 09:27

## 2022-10-20 NOTE — PROGRESS NOTES
Physical Therapy  Facility/Department: [facility] C4 PCU  Physical Therapy Initial Assessment    Name: Jose R Chanel  : 1970  MRN: [id number]  Date of Service: 10/20/2022    Discharge Recommendations:  Subacute/Skilled Nursing Facility   PT Equipment Recommendations  Equipment Needed: No  Other: defer      Patient Diagnosis(es): The primary encounter diagnosis was Acute respiratory failure with hypoxia ([address] Diagnoses of Metabolic acidosis, ESRD (end stage renal disease) ([address] Hypervolemia, unspecified hypervolemia type, and Dialysis patient, noncompliant ([address] were also pertinent to this visit. Past Medical History:  has a past medical history of Ambulatory dysfunction, Aortic stenosis, Arthritis, Asthma, Bilateral hilar adenopathy syndrome, CAD (coronary artery disease), Cardiomyopathy ([address] CHF (congestive heart failure) ([address] Chronic pain, COPD (chronic obstructive pulmonary disease) ([address] Depression, Diabetes mellitus ([address] Difficult intravenous access, Emphysema of lung ([address] ESRD (end stage renal disease) on dialysis ([address] Fear of needles, Gastric ulcer, GERD (gastroesophageal reflux disease), Heart valve problem, Hemodialysis patient ([address] History of spinal fracture, Hx of blood clots, Hyperlipidemia, Hypertension, MI (myocardial infarction) ([address] Neuromuscular disorder ([address] Numbness and tingling in left arm, Pneumonia, PONV (postoperative nausea and vomiting), Prolonged emergence from general anesthesia, Sleep apnea, Stroke ([address] TIA (transient ischemic attack), and Unspecified diseases of blood and blood-forming organs. Past Surgical History:  has a past surgical history that includes Tonsillectomy; cyst removal (2013); Colonoscopy; Coronary angioplasty with stent (05/26/15); vascular surgery (aprx 2 years ago); Colonoscopy (); Upper gastrointestinal endoscopy (2016); Upper gastrointestinal endoscopy (2017); other surgical history (2017);  Dialysis fistula creation (Left, 10/30/2017); Diagnostic Cardiac Cath Lab Procedure; other surgical history (2018); other surgical history (Bilateral, 06/26/2018); pr lap insertion tunneled intraperitoneal catheter (N/A, 9/21/2018); other surgical history (Right, 09/2018); Dialysis fistula creation (Left, 3/27/2019); other surgical history (05/28/2019); Endoscopy, colon, diagnostic; Catheter Removal (Right, 7/2/2019); Aortic valve replacement (N/A, 10/15/2019); Rectal surgery (N/A, 2/24/2022); IR TUNNELED CVC PLACE WO SQ PORT/PUMP > 5 YEARS (3/21/2022); IR TUNNELED CVC PLACE WO SQ PORT/PUMP > 5 YEARS (4/21/2022); IR TUNNELED CVC PLACE WO SQ PORT/PUMP > 5 YEARS (4/26/2022); IR TUNNELED CVC PLACE WO SQ PORT/PUMP > 5 YEARS (6/23/2022); and thoracentesis (N/A, 10/2/2022). Assessment   Body Structures, Functions, Activity Limitations Requiring Skilled Therapeutic Intervention: Decreased functional mobility ; Decreased strength;Decreased endurance;Decreased sensation;Decreased balance; Increased pain  Assessment: pt is 44 yo male admit to [facility] due to hypervolemia: PMH: ESRD, DM2, CHF ; pt requires assist at baseline for ADLs and states primarily uses electric scooter for mobility, pt states spouse provides assist and states spouse unable to assist due to spouse's own decreased mobility; pt presents Mod Ind for bed mobility, SBA to stand from bedside, and CGA to take one step, pt declined further ambulation and transfer to chair to eat breakfast, pt unsteady upon standing stating \"my legs are asleep\", provided encouragement for pt to participate fully in PT eval in order to alleviate pain and improve therapeutic outcome by increasing out of bed mobility, pt continued to decline further mobility assessments, PT recommends Short[facility]term Skilled PT at [facility]  Treatment Diagnosis: decreased functional mobility  Therapy Prognosis: Fair  Decision Making: Low Complexity  Barriers to Learning: none  Requires PT Follow-Up: Yes  Activity Tolerance  Activity Tolerance: Patient tolerated evaluation without incident     Plan   Physcial Therapy Plan  General Plan: 3-5 times per week  Current Treatment Recommendations: Strengthening, ROM, Balance training, Functional mobility training, Transfer training, Gait training, Neuromuscular re-education, Home exercise program, Safety education & training, Patient/Caregiver education & training, Equipment evaluation, education, & procurement, Modalities, Positioning, Therapeutic activities  Safety Devices  Type of Devices: Bed alarm in place, Call light within reach, Gait belt, Patient at risk for falls, Nurse notified (left sitting eob with tray in front of pt eating breakfast)  Restraints  Restraints Initially in Place: No     Restrictions  Restrictions/Precautions  Restrictions/Precautions: General Precautions, Bed Alarm  Position Activity Restriction  Other position/activity restrictions: R IV, L port, 3L O2, bipap at night, no BP LUE, tele and continuous SPO2 monitor, up with assistance     Subjective   Pain: Pt reports pain in lower back and hips 10/10.   General  Chart Reviewed: Yes  Patient assessed for rehabilitation services?: Yes  Additional Pertinent Hx: dialysis MWF ; DM2; CHF  Response To Previous Treatment: Not applicable  Family / Caregiver Present: No  Referring Practitioner: Mary  Referral Date : 10/19/22  Diagnosis: hypervolemia  General Comment  Comments: RN cleared pt for PT  Subjective  Subjective: pt agreeable to PT eval with mild encouragement         Social/Functional History  Social/Functional History  Lives With: Spouse  Type of Home: Mobile home  Home Layout: One level  Home Access: Ramped entrance  Bathroom Shower/Tub: Tub/Shower unit, Walk-in shower, Doors  Bathroom Toilet: Standard  Bathroom Equipment: Grab bars in shower, Shower chair  Bathroom Accessibility: Walker accessible  Home Equipment: colt Archuleta (scooter)  Has the patient had two or more falls in the past year or any fall with injury in the past year?: Yes (3-4)  Receives Help From:  (pt initially stated wife helps him but then later in session pt stated wife is disabled and can't help him)  ADL Assistance: Needs assistance  Toileting:  (pt reports no help needed when using the restroom)  Homemaking Assistance:  (pt reports \"aki does the shopping I dont do anything\")  Homemaking Responsibilities: No  Ambulation Assistance:  (walker, scooter, pt reports wife is disabled and doesn't help him move around)  Transfer Assistance: Independent  Active :  (\"im not supposed to\")  Occupation: Retired  Type of Occupation:   Leisure & Hobbies: \"sitting in front of the TV vegetating\"  Vision/Hearing  Vision  Vision: Impaired  Vision Exceptions: Wears glasses for reading  Hearing  Hearing: Within functional limits    Cognition   Orientation  Overall Orientation Status: Within Normal Limits  Orientation Level: Oriented X4  Cognition  Overall Cognitive Status: Exceptions  Safety Judgement: Decreased awareness of need for assistance;Decreased awareness of need for safety     Objective   Heart Rate: 62 (59 bpm at start of session)   Heart Rate Source: Monitor  BP: 122/72 (118/67 at start of session)   BP Location: Right upper arm  BP Method: Automatic  Patient Position: Semi fowlers  MAP (Calculated): 88.67  Resp: 16  SpO2: 97 %  O2 Device: Nasal cannula (3LO2)      Observation/Palpation  Posture: Good  Gross Assessment  AROM: Generally decreased, functional  Strength: Generally decreased, functional  Coordination: Within functional limits  Tone: Normal  Sensation: Impaired (reports N&T B LE)                 Bed Mobility Training  Bed Mobility Training: Yes  Overall Level of Assistance: Stand-by assistance  Supine to Sit: Stand-by assistance  Balance  Sitting: Intact  Standing: Intact (pt completed 1 step forward and 1 step backward SBA w/ RW)  Transfer Training  Transfer Training: Yes  Overall Level of Assistance: Stand-by assistance  Interventions: Verbal cues; Safety awareness training  Sit to Stand: Stand-by assistance  Stand to Sit: Stand-by assistance  Bed mobility  Supine to Sit: Modified independent (hob slightly elevated)  Sit to Supine:  (pt sitting eob at end of session eating breakfast)  Scooting: Modified independent  Transfers  Sit to Stand: Stand by assistance  Stand to Sit: Stand by assistance  Bed to Chair:  (pt declined transfer to chair at time of eval)  Ambulation  Surface: Level tile  Device: Rolling Walker  Assistance: Contact guard assistance  Quality of Gait: unsteady  Distance: 1 step forward, 1 step backward  Comments: pt declining further ambulation at time of eval  More Ambulation?: No  Stairs/Curb  Stairs?: No     Balance  Posture: Good  Sitting - Static: Good  Sitting - Dynamic: Good  Standing - Static: Fair  Standing - Dynamic: Fair;-  Comments: unable to test dynamic standing balance formally  Exercise Treatment: provided CHF packet with HEP included      AM-PAC Score  AM-PAC Inpatient Mobility Raw Score : 18 (10/20/22 1002)  AM-PAC Inpatient T-Scale Score : 43.63 (10/20/22 1002)  Mobility Inpatient CMS 0-100% Score: 46.58 (10/20/22 1002)  Mobility Inpatient CMS G-Code Modifier : CK (10/20/22 1002)        Goals  Short Term Goals  Time Frame for Short Term Goals: one week 10/27 unless otherwise indicated  Short Term Goal 1: pt will transfer supine to and from sit with bed flat with independence by 10/24  Short Term Goal 2: pt will transfer sit to and from stand with mod ind  Short Term Goal 3: pt will ambulate 25ft with RW with sup  Short Term Goal 4: pt will meet 2/5 CHF goals  Patient Goals   Patient Goals : \"I need to go somewhere\"       Education  Patient Education  Education Given To: Patient  Education Provided: Role of Therapy;Plan of Care;Transfer Training  Education Provided Comments: educated regarding benefit of out of bed mobility  Education Method: Verbal  Barriers to Learning: None  Education Outcome: Verbalized understanding;Continued education needed      Therapy Time   Individual Concurrent Group Co-treatment   Time In 0808         Time Out 0844         Minutes 36         Timed Code Treatment Minutes: 26 Minutes     If pt is unable to be seen after this session, please let this note serve as discharge summary. Please see case management note for discharge disposition. Thank you. Crystal Segovia, PT      PHYSICAL THERAPY HEART FAILURE EDUCATION:  PHYSICAL THERAPY HEART FAILURE EDUCATION GOALS:  1. Identifying HF Activity Zone with the Self Check Plan prior to exercises or walking    Patient educated in and demonstrated/verbalized understanding Identifying HF activity zone with the Self Check Plan referencing Red/Yellow/Green Light Symbol prior to exercises or walking  to appropriately determine daily activity level. 2.Rating self on RILEY scale of perceived exertion   Patient educated in and demonstrated and/or verbalized understanding Rating self on RILEY scale of perceived exertion  3. HF Therapeutic Exercises  Pt educated in and completed Therapeutic exercises (Supine, Seated ,Standing, walking) as indicated below for 1 set) to promote circulation and prevent complications of bedrest with patient verbalizes understanding of employing green zone, yellow zone and red zone to seek provider input and evaluation. 4. Daily Weight check/Stepping on Scale  Pt educated in importance of checking body weight daily. Barriers to completion of Daily weight check are identified with recommendations made or Patient demonstrates and/or verbalizes safety in:stepping up to weight self and complete downward gaze/head nod to read scale on standard scale  and safety stepping off scale.    5. Teach back of Elements of PT HF Education  Pt voices and demonstrates appropriate teach back of elements of Physical Therapy Heart Failure Education Program                        1)Heart Failure Zones Self Check Plan referencing Red/Yellow/Green Light Symbol with:  []Green Zone/All Clear:   physical activity is normal for you,   No new swelling in feet, ankles, legs or stomach,   No weight gain of more than 2-3 pounds,   No chest pain or worsening of shortness of breath. (Continue daily: weight check, meds as directed, low salt eating, monitoring of fluid intake, balance activity, follow up visits)  []Yellow Zone/Caution:   Increased cough or shortness of breath with activity  Increased swelling in your feet, ankles, legs or stomach from baseline  Weight gain or loss of more than 2-3 pounds in 1 day. Increase in the number of pillows needed while sleeping  (Check In!: You need to contact your doctor or provider as soon as possible)  []Red Zone/Medical Alert:  Unrelieved chest pain or shortness of breath, especially while resting  Increased Discomfort or swelling in the abdomen or lower body  Sudden weight gain of more than 5 pounds in a week  Increased cough with bubbly and/or pink sputum  (Warning: You need to be seen right away . If you cannot reach your physician, call 911)    2)John Rating of Perceived Exertion (RPE) Scale     The John rating scale ranges from 6 to 20, where 6 means \"no exertion at all\" and 20 means \"maximal exertion. \" The patient chooses the number that best describes their level of exertion. This will objectify the intensity level of the patient's activity, and the therapist can use this information to accelerate or decelerate activity levels to reach the desired range. The patient should appraise their feeling of exertion as honestly as possible, without thinking about what the actual physical load is. Their feeling of effort and exertion is important, and it should not be compared to the level of others. Patient's should target exercises for very light to moderate (9-11/20) for this phase of their rehab.      John RPE Scale    Activity Completed:  Bed mobility, sit to stand transfer, one step forward and backward     []6  No exertion at all  []7  Extremely light  []8  [] 9  Very light (For a healthy person, it is like walking slowly at his or her own pace for some minutes)  []10  []11  Light  []12  []13  Somewhat hard (exercise, but it still feels OK to continue)  [x]14  []15  Hard (heavy)  []16  []17  Very hard (can still go on, but really has to push himself. It feels very heavy, and the person is very tired. )[]18  []19  Extremely hard (For most people this is the most strenuous exercise they have ever experienced.)  []20  Maximal exertion. 3) Pt educated in and completed Therapeutic exercise (as indicated below for 1 set) to promote circulation and prevent complications of bedrest with patient verbalizes understanding of employing green zone, yellow zone and red zone to seek provider input and evaluation. Educated patient in :  []Supine    Level 1: Bed Exercise 10-15 reps, 2x/day  []Ankle Pumps  []Heel Slides  []Hip Abduction  []Buttocks Squeeze  []Diaphragmatic Breathing with TA set  []Shoulder Shrugs  []Bicep Curl  []Hands open/close                          []Sitting    Level 2: Seated Exercises 10-15 reps, 2x/day  []Toe raise/heel raise  []Long Arc Quad  []Seated March  []Seated Clamshell  []Diaphragmatic Breathing with TA set and BUE ER and IR  []Shoulder Shrugs  []Bicep Curls  []Hands open/close                         []Standing   Level 3: Standing Exercises 10-15 reps, 2x/day     []Sit to/from stand  []Standing march  []Standing side steps  []Standing bilateral heel raise  []Diaphragmatic breathing with TA set and BUE flex/ext  []Shoulder Shrugs  []Bicep Curls  []Hands open/close                        []WalkingLevel 1: Educated patient in initiating walking when in the Green zone and to Start with 2-5 minutes daily and work up to 10 minutes per day.  Walk at a slow, comfortable pace with your exertion level Very Light (RILEY 9) to Light (RILEY 11)   You should be able to comfortably hold a conversation while walking. 4)Daily Weight check/Stepping on Scale  []Pt educated in importance of checking body weight daily and []demonstrate/[]verbalizes safety in:stepping up to weigh self and complete downward gaze/head nod to read scale on standard scale  and safety stepping off scale. []Barriers to completion of Daily weight check:       5)Pt voices and demonstrates appropriate teach back of Heart Failure Education Program with : use of the   []1. Identifying Appropriate Zone from HF Zone Self-Check Plan                          []2. Rating self on RILEY.  []3. Therapeutic exercise/walking program      []4. Importance of taking daily weight measurement             []5.  Safely stepping on and off scale    []Pt will benefit from reinforcement of education due to   [x]Readiness to learn                               []Decreased cognition  []Language barrier                                  []Decreased vision/hearing  []First introduction to new information    []Requires intermittent cues  []Other:    Education provided via:  [x]Oral instruction  []Demonstration  [x]Written handout

## 2022-10-20 NOTE — DISCHARGE SUMMARY
Hospital Medicine Discharge Summary    Patient ID: Lawana Collet      Patient's PCP: Verna Smith MD    Admit Date: 10/18/2022     Discharge Date:   10/20/22     Admitting Provider: Shruthi Monahan MD     Discharge Provider: Radha Michelle MD     Discharge Diagnoses: Active Hospital Problems    Diagnosis     Hypervolemia [E87.70]      Priority: Medium    HFrEF (heart failure with reduced ejection fraction) (formerly [facility]) [I50.20]      Priority: Medium    Type 2 diabetes mellitus with hyperglycemia (formerly [facility]) [E11.65]     PAF (paroxysmal atrial fibrillation) (formerly [facility]) [I48.0]     ESRD (end stage renal disease) on dialysis ([facility] [address] [N18.6, Z99.2]     ZAINAB on CPAP [G47.33, Z99.89]        The patient was seen and examined on day of discharge and this discharge summary is in conjunction with any daily progress note from day of discharge. Hospital Course: \"Igor Blake is a 45 y.o. male. He presented ot the ER from home by ambulance for dyspnea. He has multiple chronic medical problems including ESRD on HD. He is supposed to be on a qMWF scheduled. He missed dialysis scheduled for Monday 10/17/22. He related his ride did not show up. He is hospitalized frequently for similar issues. This is his eleventh hospitalization this year. He has a long h/o noncompliance. \"      Volume overload due to missed HD. HD 10/19 and 10/20. CM looking into whether anyone failed to provide transport. Acute on chronic hypoxic respiratory failure. Acute component due to pulmonary edema. Underlying COPD. Weaned to baseline 3LNC. Chronic diastolic and systolic CHF. I wouldn't call this acute heart failure, more like non-cardiogenic volume overload. Continue home entresto, toprol XL, isordil. CAD, PAF.  plavix, apixaban, pravastatin.           Physical Exam Performed:     /72   Pulse 62   Temp 97.9 °F (36.6 °C) (Oral)   Resp 16   Ht 5' 9\" (1.753 m)   Wt 265 lb 14 oz (120.6 kg)   SpO2 97%   BMI 39.26 kg/m²       General appearance:  No apparent distress, appears stated age and cooperative. HEENT:  Normal cephalic, atraumatic without obvious deformity. Pupils equal, round, and reactive to light. Extra ocular muscles intact. Conjunctivae/corneas clear. Neck: Supple, with full range of motion. No jugular venous distention. Trachea midline. Respiratory:  Normal respiratory effort. Clear to auscultation, bilaterally without Wheezes/Rhonchi. Faint bibasilar rales. Cardiovascular:  Regular rate and rhythm with normal S1/S2 without murmurs, rubs or gallops. Abdomen: Soft, non-tender, non-distended with normal bowel sounds. Musculoskeletal:  No clubbing, cyanosis. Trace BLE pitting edema. Full range of motion without deformity. Skin: Skin color, texture, turgor normal.  No rashes or lesions. Neurologic:  Neurovascularly intact without any focal sensory/motor deficits. Cranial nerves: II-XII intact, grossly non-focal.  Psychiatric:  Alert and oriented, thought content appropriate, normal insight  Capillary Refill: Brisk,< 3 seconds   Peripheral Pulses: +2 palpable, equal bilaterally       Labs: For convenience and continuity at follow-up the following most recent labs are provided:      CBC:    Lab Results   Component Value Date/Time    WBC 8.0 10/20/2022 05:01 AM    HGB 9.7 10/20/2022 05:01 AM    HCT 30.0 10/20/2022 05:01 AM     10/20/2022 05:01 AM       Renal:    Lab Results   Component Value Date/Time     10/20/2022 05:01 AM    K 5.4 10/20/2022 05:01 AM    CL 92 10/20/2022 05:01 AM    CO2 23 10/20/2022 05:01 AM    BUN 74 10/20/2022 05:01 AM    CREATININE 7.0 10/20/2022 05:01 AM    CALCIUM 8.1 10/20/2022 05:01 AM    PHOS 6.4 10/19/2022 07:25 PM         Significant Diagnostic Studies    Radiology:   XR CHEST PORTABLE   Final Result   1. Cardiomegaly with mild vascular congestion and moderate size left pleural   effusion.                 Consults:     IP CONSULT TO NEPHROLOGY  IP CONSULT TO HOSPITALIST  IP CONSULT TO NEPHROLOGY  IP CONSULT TO HOME CARE NEEDS    Disposition:  home or SNF, up to patient. Condition at Discharge: Stable    Discharge Instructions/Follow-up:  Follow up with PCP within 1-2 weeks. Code Status:  Full Code     Activity: activity as tolerated    Diet: renal diet      Discharge Medications:     Current Discharge Medication List             Details   gabapentin (NEURONTIN) 100 MG capsule Take 2 capsules by mouth 3 times daily as needed (neuropathic pain) for up to 10 days. Qty: 60 capsule, Refills: 0    Associated Diagnoses: Dialysis patient, noncompliant ([facility] [address]                Details   oxyCODONE-acetaminophen (PERCOCET) 7.5-325 MG per tablet Take 1 tablet by mouth every 6 hours as needed for Pain for up to 10 days.   Qty: 30 tablet, Refills: 0    Comments: Reduce doses taken as pain becomes manageable  Associated Diagnoses: Dialysis patient, noncompliant ([facility] [address]                Details   sacubitril-valsartan (ENTRESTO) 24-26 MG per tablet Take 0.5 tablets by mouth 2 times daily  Qty: 30 tablet, Refills: 0      metoprolol succinate (TOPROL XL) 100 MG extended release tablet Take 1 tablet by mouth in the morning and at bedtime  Qty: 30 tablet, Refills: 3      cyclobenzaprine (FLEXERIL) 5 MG tablet Muscle spasms    Associated Diagnoses: Chronic pain syndrome      traZODone (DESYREL) 100 MG tablet Take 100 mg by mouth nightly      QUEtiapine (SEROQUEL) 50 MG tablet TAKE 1 TABLET BY MOUTH EVERY EVENING  Qty: 30 tablet, Refills: 10    Associated Diagnoses: Insomnia, unspecified type; Mood disorder (HCC)      isosorbide dinitrate (ISORDIL) 20 MG tablet Take 1 tablet by mouth 3 times daily  Qty: 90 tablet, Refills: 3      clopidogrel (PLAVIX) 75 MG tablet Take 1 tablet by mouth daily  Qty: 90 tablet, Refills: 3      pantoprazole (PROTONIX) 40 MG tablet TAKE 1 TABLET BY MOUTH EVERY MORNING BEFORE BREAKFAST  Qty: 30 tablet, Refills: 10    Associated Diagnoses: Gastroesophageal reflux disease without esophagitis      docusate sodium (DOK) 100 MG capsule Take 1 capsule by mouth daily  Qty: 30 capsule, Refills: 5      DULoxetine (CYMBALTA) 30 MG extended release capsule TAKE 1 CAPSULE BY MOUTH EVERY DAY  Qty: 30 capsule, Refills: 10    Associated Diagnoses: Depression, unspecified depression type      mineral oil liquid Take 30 mLs by mouth daily as needed for Constipation  Qty: 300 mL, Refills: 0    Associated Diagnoses: Constipation, unspecified constipation type      sennosides-docusate sodium (SENOKOT-S) 8.6-50 MG tablet Take 1 tablet by mouth daily  Qty: 10 tablet, Refills: 0    Associated Diagnoses: Constipation, unspecified constipation type      apixaban (ELIQUIS) 5 MG TABS tablet Take 1 tablet by mouth 2 times daily  Qty: 60 tablet, Refills: 1      pravastatin (PRAVACHOL) 40 MG tablet Take 1 tablet by mouth daily  Qty: 90 tablet, Refills: 3      Continuous Blood Gluc Sensor (DEXCOM G6 SENSOR) MISC Every 10 days  Qty: 9 each, Refills: 3    Associated Diagnoses: DM (diabetes mellitus), secondary, uncontrolled, w/neurologic complic      Continuous Blood Gluc Transmit (DEXCOM G6 TRANSMITTER) MISC 1 each by Does not apply route every 3 months  Qty: 1 each, Refills: 3    Associated Diagnoses: DM (diabetes mellitus), secondary, uncontrolled, w/neurologic complic      Continuous Blood Gluc  (DEXCOM G6 ) ADAM 1 each by Does not apply route Daily with lunch  Qty: 1 each, Refills: 0    Associated Diagnoses: DM (diabetes mellitus), secondary, uncontrolled, w/neurologic complic      B Complex-C-Folic Acid (VIRT-CAPS) 1 MG CAPS TAKE 1 CAPSULE BY MOUTH EVERY DAY  Qty: 90 capsule, Refills: 1      Calcium Acetate, Phos Binder, 667 MG CAPS TAKE 1 CAPSULE BY MOUTH THREE TIMES DAILY WITH MEALS  Qty: 90 capsule, Refills: 3      nitroGLYCERIN (NITROSTAT) 0.4 MG SL tablet DISSOLVE 1 TABLET UNDER THE TONGUE AS NEEDED FOR CHEST PAIN EVERY 5 MINUTES UP TO 3 TIMES. IF NO RELIEF CALL 911.   Qty: 25 tablet, Refills: 10    Associated Diagnoses: Coronary artery disease involving native heart without angina pectoris, unspecified vessel or lesion type      vitamin D (ERGOCALCIFEROL) 31935 units CAPS capsule TK 1 C PO WEEKLY  Refills: 11      Tiotropium Bromide-Olodaterol (STIOLTO RESPIMAT) 2.5-2.5 MCG/ACT AERS Inhale 2 puffs into the lungs daily  Qty: 2 Inhaler, Refills: 0    Comments: 2 samples given:  Lot #[id number], Exp 7/21 & Lot #[id number], Exp 7/21      Blood Glucose Monitoring Suppl ADAM USE AS DIRECTED. Qty: 1 Device, Refills: 0    Comments: WITH CONTROL SOLUTION. Alcohol Swabs PADS USE AS DIRECTED  Qty: 300 each, Refills: 3      albuterol sulfate  (90 Base) MCG/ACT inhaler Inhale 2 puffs into the lungs every 6 hours as needed for Wheezing  Qty: 1 Inhaler, Refills: 3      ipratropium-albuterol (DUONEB) 0.5-2.5 (3) MG/3ML SOLN nebulizer solution Inhale 3 mLs into the lungs every 6 hours as needed for Shortness of Breath  Qty: 360 mL, Refills: 1      calcium carbonate (TUMS) 500 MG chewable tablet Take 1 tablet by mouth 3 times daily as needed for Heartburn. Time Spent on discharge is more than 30 minutes in the examination, evaluation, counseling and review of medications and discharge plan. Signed:    Phuc Elizondo MD   10/20/2022      Thank you Stefania Evans MD for the opportunity to be involved in this patient's care. If you have any questions or concerns, please feel free to contact me at [phone].

## 2022-10-20 NOTE — PROGRESS NOTES
Occupational Therapy  Facility/Department: [facility] C4 PCU  Occupational Therapy Initial Assessment/Treatment    Name: Miguelito Burch  : 1970  MRN: [id number]  Date of Service: 10/20/2022    Discharge Recommendations:  Subacute/Skilled Nursing Facility  OT Equipment Recommendations: Raised Toilet Seat with arms - pt reports \"i need a higher toilet but I can't afford one\"     Patient Diagnosis(es): The primary encounter diagnosis was Acute respiratory failure with hypoxia ([address] Diagnoses of Metabolic acidosis, ESRD (end stage renal disease) ([address] Hypervolemia, unspecified hypervolemia type, and Dialysis patient, noncompliant ([address] were also pertinent to this visit. Past Medical History:  has a past medical history of Ambulatory dysfunction, Aortic stenosis, Arthritis, Asthma, Bilateral hilar adenopathy syndrome, CAD (coronary artery disease), Cardiomyopathy ([address] CHF (congestive heart failure) ([address] Chronic pain, COPD (chronic obstructive pulmonary disease) ([address] Depression, Diabetes mellitus ([address] Difficult intravenous access, Emphysema of lung ([address] ESRD (end stage renal disease) on dialysis ([address] Fear of needles, Gastric ulcer, GERD (gastroesophageal reflux disease), Heart valve problem, Hemodialysis patient ([address] History of spinal fracture, Hx of blood clots, Hyperlipidemia, Hypertension, MI (myocardial infarction) ([address] Neuromuscular disorder ([address] Numbness and tingling in left arm, Pneumonia, PONV (postoperative nausea and vomiting), Prolonged emergence from general anesthesia, Sleep apnea, Stroke ([address] TIA (transient ischemic attack), and Unspecified diseases of blood and blood-forming organs. Past Surgical History:  has a past surgical history that includes Tonsillectomy; cyst removal (2013); Colonoscopy; Coronary angioplasty with stent (05/26/15); vascular surgery (aprx 2 years ago); Colonoscopy (); Upper gastrointestinal endoscopy (2016);  Upper gastrointestinal endoscopy (01/29/2017); other surgical history (02/01/2017); Dialysis fistula creation (Left, 10/30/2017); Diagnostic Cardiac Cath Lab Procedure; other surgical history (2018); other surgical history (Bilateral, 06/26/2018); pr lap insertion tunneled intraperitoneal catheter (N/A, 9/21/2018); other surgical history (Right, 09/2018); Dialysis fistula creation (Left, 3/27/2019); other surgical history (05/28/2019); Endoscopy, colon, diagnostic; Catheter Removal (Right, 7/2/2019); Aortic valve replacement (N/A, 10/15/2019); Rectal surgery (N/A, 2/24/2022); IR TUNNELED CVC PLACE WO SQ PORT/PUMP > 5 YEARS (3/21/2022); IR TUNNELED CVC PLACE WO SQ PORT/PUMP > 5 YEARS (4/21/2022); IR TUNNELED CVC PLACE WO SQ PORT/PUMP > 5 YEARS (4/26/2022); IR TUNNELED CVC PLACE WO SQ PORT/PUMP > 5 YEARS (6/23/2022); and thoracentesis (N/A, 10/2/2022). Assessment   Pt is 46 yo M presenting to [facility] w/ diagnosis of hypervolemia. Pt resides with wife in mobile home w/ ramped entry. Pt has both a walk in shower and tub/shower unit and a RW and scooter. Pt is potential poor historian initially stating that wife assists w/ ADL/transfers/ambulation as needed, however as evel progressed pt stated that his wife is disabled and cannot help him. At this time pt is SBA bed mobility and sit<>stand transfers from bed and one step forward/backward w/ RW. Pt reports continuous pain/numbness/tingling/weakness in BLE and that it is not new this admission and occurs every day. Pt adamantly refused the need to complete any ADLs or transfer from bed to recliner this date stating \"no I'm not doing anything\". Pt presents with deficits listed below and will continue to benefit from skilled OT services to return to [facility]. In order to increase pts safety and ind with ADLs/IADLs and functional mobility/transfers the discharge recommendation at this time is SNF.  IP Rehab is not recommended at discharge as pt does not demo the ability to tolerate 3 hours of therapy a day due to decreased motivation. Performance deficits / Impairments: Decreased functional mobility ; Decreased ADL status; Decreased endurance  Assessment: Pt is 44 yo M presenting to [facility] w/ diagnosis of hypervolemia. Pt resides with wife in mobile home w/ ramped entry. Pt has both a walk in shower and tub/shower unit and a RW and scooter. Pt is potential poor historian initially stating that wife assists w/ ADL/transfers/ambulation as needed, however as evel progressed pt stated that his wife is disabled and cannot help him. At this time pt is SBA bed mobility and sit<>stand transfers from bed and one step forward/backward w/ RW. Pt reports continuous pain/numbness/tingling/weakness in BLE and that it is not new this admission and occurs every day. Pt adamantly refused the need to complete any ADLs or transfer from bed to recliner this date stating \"no I'm not doing anything\". Pt presents with deficits listed below and will continue to benefit from skilled OT services to return to [facility]. In order to increase pts safety and ind with ADLs/IADLs and functional mobility/transfers the discharge recommendation at this time is SNF. IP Rehab is not recommended at discharge as pt does not demo the ability to tolerate 3 hours of therapy a day due to decreased motivation.   Prognosis: Good  Decision Making: Medium Complexity  REQUIRES OT FOLLOW-UP: Yes  Activity Tolerance: Patient limited by fatigue;Patient limited by pain;Treatment limited secondary to agitation        Plan:  Occupational Therapy Plan  Times Per Week: 3-5x/wk  Current Treatment Recommendations: Strengthening, Functional mobility training, Endurance training, Gait training, Safety education & training, Patient/Caregiver education & training, Equipment evaluation, education, & procurement, Self-Care / ADL     Restrictions: General Precautions, Bed Alarm, R IV, L port, 3L O2, bipap at night, no BP LUE, tele and continuous SPO2 monitor, up with assistance    Subjective:  Chart Reviewed: Yes, Orders, Progress Notes, History and Physical, Labs  Patient assessed for rehabilitation services?: Yes  Referring Practitioner: Fransisco Brito MD  Diagnosis: Hypervolemia  Subjective: Pt semifowlers in bed agreeable to OT services upon arrival. RN approved therapy. Pain: Pt reports pain in lower back and hips 10/10. Social/Functional History:  Social/Functional History  Lives With: Spouse  Type of Home: Mobile home  Home Layout: One level  Home Access: Ramped entrance  Bathroom Shower/Tub: Tub/Shower unit, Walk-in shower, Doors  Bathroom Toilet: Standard  Bathroom Equipment: Grab bars in shower, Shower chair  Bathroom Accessibility: Walker accessible  Home Equipment: RW, scooter  Has the patient had two or more falls in the past year or any fall with injury in the past year?: Yes (3-4)  Receives Help From: pt initially stated wife helps him but then later in session pt stated wife is disabled and can't help him  ADL Assistance: Needs assistance  Toileting:  (pt reports no help needed when using the restroom)  Homemaking Assistance: pt reports \"aki does the shopping I dont do anything\"  Homemaking Responsibilities: No  Ambulation Assistance: walker, scooter, pt initially stated wife helps him but then later in session pt stated wife is disabled and can't help him  Transfer Assistance: Independent  Active : \"im not supposed to\"  Occupation: Retired  Type of Occupation:   Leisure & Hobbies: \"sitting in front of the TV vegetating\"    Objective:  Heart Rate: 59  Heart Rate Source: Monitor  BP: 118/67  BP Location: Right upper arm  BP Method: Automatic  Patient Position: Semi fowlers  SpO2: 97 %  O2 Device: Nasal cannula 3L    Posture: Good  Safety Devices: All fall risk precautions in place; Bed alarm in place;Gait belt;Patient at risk for falls; Left in bed;Nurse notified  Restraints initially in Place: No    Bed Mobility:  Overall Level of Assistance: Stand-by assistance  Supine to Sit: Stand-by assistance    Balance:  Sitting: Intact  Standing: Intact (pt completed 1 step forward and 1 step backward SBA w/ RW)    Transfer Training:  Overall Level of Assistance: Stand-by assistance w/ RW  Interventions: Verbal cues; Safety awareness training  Sit to Stand: Stand-by assistance w/ RW  Stand to Sit: Stand-by assistance w/ RW     Strength/ROM:  AROM: Generally decreased, functional  PROM: Generally decreased, functional  Strength: Generally decreased, functional  Coordination: Generally decreased, functional  Tone: Normal  Sensation: Intact  LUE AROM: WFL  Left Hand AROM: WFL  RUE AROM: WFL  Right Hand AROM:WFL  Hand Dominance: Right    ADL:  Feeding: Independent - seated EOB pt ate breakfast  Grooming: items to brush teeth/hair set on pt tray table and pt refused task completion at this time and stated \"ill just get up to the sink and do it later\" pt educated on necessity of using call light to have assistance to bathroom and bed alarm turned on  Additional Comments: pt adamently denied any ADL completion after multiple attempts at this time    Vision/Hearing/Perception:  Vision: Impaired - Wears glasses for reading  Hearing: Within functional limits  Overall Cognitive Status: WNL  Overall Orientation Status: Within Normal Limits  Orientation Level: Oriented X4  Overall Perceptual Status: Impaired (pt reports BLE numbness and tingling everyday - \"thats why I dont like to move around at all\")    Education:  Education Given To: Patient  Education Provided: Role of Therapy;Plan of Care;Transfer Training;Energy Conservation  Education Method: Demonstration;Verbal  Barriers to Learning: Other (Comment) (BLE pain, agitation, low motivation to participate)  Education Outcome: Verbalized understanding;Demonstrated understanding;Continued education needed  Disease specific: Pt educated on benefits of OOB activity to decrease risks associated with prolonged bedrest while in hospital. Pt verbalized understanding. Attempted Therapy Heart Failure Education this date. Pt inappropriate for education at this time due to :  []Cognition   []Medical Status   []Readiness to learn  [x]Refusal   []Language barrier  []Decreased vision/hearing    []No family present     Should this change during treatment, education will be initiated. If family/ pt's support system is present at subsequent therapy session, will attempt to initiate education. AM-PAC Score  AM-PAC Inpatient Daily Activity Raw Score: 16 (10/20/22 0859)  AM-PAC Inpatient ADL T-Scale Score : 35.96 (10/20/22 0859)  ADL Inpatient CMS 0-100% Score: 53.32 (10/20/22 0859)  ADL Inpatient CMS G-Code Modifier : CK (10/20/22 0859)    Goals  Short Term Goals  Time Frame for Short Term Goals: 1 wk 10/27/2022  Short Term Goal 1: Pt will complete functional transfers in prep for ADL completion SPV. 10/24/2022  Short Term Goal 2: Pt will complete toileting Manny. Short Term Goal 3: Pt will complete stance sinkside ADLs Manny. Short Term Goal 4: Pt will complete TB dressing Manny. Therapy Time   Individual Concurrent Group Co-treatment   Time In 0808         Time Out 0844         Minutes 36         Timed Code Treatment Minutes: 26 Minutes (10 min eval)    Marcial Leung OTR/L  If pt is unable to be seen after this session, please let this note serve as discharge summary. Please see case management note for discharge disposition. Thank you.

## 2022-10-20 NOTE — CARE COORDINATION
The [employer] portal is down online. CM unable to submit for pre-cert and facility also attempted to do online pre-cert and they too are unable to submit online. Call placed to [employer] at [phone] option 3 then  option 3 and then option 2 to attempt to speak to live person regarding prec-ert. Facility faxed in pre-cert since online system down. However faxing method usually takes longer to receive auth. If system is available later will re-attempt online cert.

## 2022-10-20 NOTE — DISCHARGE INSTR - COC
Continuity of Care Form    Patient Name: Jayesh Bravo   :  1970  MRN:  [id number]    Admit date:  10/18/2022  Discharge date:  10/20/2022    Code Status Order: Full Code   Advance Directives:     Admitting Physician:  Vishal Diaz MD  PCP: Matty Hernandez MD    Discharging Nurse: [facility] Unit/Room#: 0519/8156-43  Discharging Unit Phone Number: [phone]    Emergency Contact:   Extended Emergency Contact Information  Primary Emergency Contact: AnnCrystal  Address: [address] [place], [place] [address]ida [facility], [address] [facility],[address] Phone: [phone]  Mobile Phone: [phone]  Relation: Spouse  Secondary Emergency Contact: Sabrina Tripp  Mobile Phone: [phone]  Relation: Brother/Sister  Preferred language: English   needed?  No    Past Surgical History:  Past Surgical History:   Procedure Laterality Date    AORTIC VALVE REPLACEMENT N/A 10/15/2019    TRANSCATHETER AORTIC VALVE REPLACEMENT FEMORAL APPROACH performed by Alyssa Stein MD at [address] Right 2019    PERITONEAL DIALYSIS CATHETER REMOVAL performed by Emilia Renner MD at [address]      WN    CORONARY ANGIOPLASTY WITH STENT PLACEMENT  05/26/15    CYST REMOVAL  2013    EXCISION CYSTS, NECK X2 AND ABDOMINAL benign    DIAGNOSTIC CARDIAC CATH LAB PROCEDURE      DIALYSIS FISTULA CREATION Left 10/30/2017    LEFT BRACHIAL CEPHALIC FISTULA    DIALYSIS FISTULA CREATION Left 3/27/2019    LIGATION  AV FISTULA performed by Veronica Ledezma MD at [address] [place] [address], DIAGNOSTIC      IR TUNNELED CATHETER PLACEMENT GREATER THAN 5 YEARS  3/21/2022    IR TUNNELED CATHETER PLACEMENT GREATER THAN 5 YEARS 3/21/2022 [facility]    IR TUNNELED CATHETER PLACEMENT GREATER THAN 5 YEARS  2022    IR TUNNELED CATHETER PLACEMENT GREATER THAN 5 YEARS 2022 [facility]    IR TUNNELED CATHETER PLACEMENT GREATER THAN 5 YEARS  4/26/2022    IR TUNNELED CATHETER PLACEMENT GREATER THAN 5 YEARS 4/26/2022 [facility]    IR TUNNELED CATHETER PLACEMENT GREATER THAN 5 YEARS  6/23/2022    IR TUNNELED CATHETER PLACEMENT GREATER THAN 5 YEARS 6/23/2022 [facility]    OTHER SURGICAL HISTORY  02/01/2017    laparoscopic cholecystectomy with intraoperative cholangiogram    OTHER SURGICAL HISTORY  2018    PORT PLACEMENT  - vas cath    OTHER SURGICAL HISTORY Bilateral 06/26/2018    laprascopic peritoneal dialysis catheter placement    OTHER SURGICAL HISTORY Right 09/2018    peritoneal dialysis port placed on right side of abdomen    OTHER SURGICAL HISTORY  05/28/2019    PTA/Stenting R External Iliac artery    [place] LAP INSERTION TUNNELED INTRAPERITONEAL CATHETER N/A 9/21/2018    LAPAROSCOPIC PERITONEAL DIALYSIS CATHETER REPLACEMENT performed by Daisy Keys MD at [address] 2/24/2022    PERINEAL ABCESS DRAINAGE performed by Daisy Keys MD at [address] [place] [address] N/A 10/2/2022    THORACENTESIS ULTRASOUND performed by Vaibhav Howe MD at [address] [place] [address]  01/06/2016    UPPER GASTROINTESTINAL ENDOSCOPY  01/29/2017    possible candida, otherwise normal appearing    VASCULAR SURGERY  aprx 2 years ago    2 stents placed, each side of groin       Immunization History:   Immunization History   Administered Date(s) Administered    Hepatitis B Adult (Engerix-B) 11/18/2017, 06/13/2018, 07/13/2018    INFLUENZA, INTRADERMAL, QUADRIVALENT, PRESERVATIVE FREE 11/16/2016    Influenza Virus Vaccine 12/27/2012, 10/07/2014, 11/20/2015, 10/16/2019    Influenza, FLUARIX, FLULAVAL, FLUZONE (age 10 mo+) AND AFLURIA, (age 1 y+), PF, 0.5mL 10/16/2019    Influenza, FLUCELVAX, (age 10 mo+), MDCK, PF, 0.5mL 10/14/2017, 09/30/2020, 10/05/2021    Influenza, Quadv, 6 mo and older, IM, PF (Flulaval, Fluarix) 09/15/2018    PPD Test 11/09/2017, 11/16/2017, 01/03/2019, 01/06/2020, 01/15/2021    Pneumococcal Conjugate 13-valent ([id number]) 10/14/2017    Pneumococcal Polysaccharide (Jdhkbmmyc12) 10/07/2014, 11/19/2019    Tdap (Boostrix, Adacel) 02/13/2020    Zoster Recombinant (Shingrix) 02/13/2020       Active Problems:  Patient Active Problem List   Diagnosis Code    Sepsis without acute organ dysfunction ([facility] A41.9    CHF (congestive heart failure) ([facility]) I50.9    Asthma-COPD overlap syndrome ([facility]) J44.9    CAD (coronary artery disease) I25.10    PVD (peripheral vascular disease) ([facility] I73.9    Bicuspid aortic valve Q23.1    Bilateral hilar adenopathy syndrome R59.0    Claudication in peripheral vascular disease ([facility]) I73.9    Uncontrolled hypertension I10    Diabetic neuropathy ([facility]) E11.40    Type 2 diabetes, uncontrolled, with neuropathy [id number]    Passive smoke exposure Z77.22    Depression with anxiety F41.8    PNA (pneumonia) J18.9    Obesity (BMI 30-39. 9) E66.9    ZAINAB on CPAP G47.33, Z99.89    Degeneration of lumbar or lumbosacral intervertebral disc M51.37    Lumbar radiculopathy M54.16    Lumbosacral spondylosis without myelopathy L11.745    Biliary colic L43.52    Symptomatic cholelithiasis K80.20    Gastroparesis due to DM ([facility]) E11.43, K31.84    Angina, class IV ([facility]) I20.9    Dyspnea R06.00    Dyslipidemia E78.5    Acute on chronic combined systolic and diastolic heart failure ([facility]) I50.43    Ischemic cardiomyopathy I25.5    Tobacco abuse Z72.0    CVA (cerebral vascular accident) ([facility] I63.9    History of CVA (cerebrovascular accident) Z86.73    Type 2 diabetes mellitus without complication, without long-term current use of insulin ([facility]) E11.9    ZAINAB (obstructive sleep apnea) G47.33    Pleural effusion on left J90    Chronic anemia D64.9    Nonrheumatic aortic valve stenosis I35.0    Mucus plugging of bronchi T17.500A    Hemodialysis-associated hypotension I95.3    ESRD (end stage renal disease) on dialysis ([facility] N18.6, Z99.2    Hypotension due to drugs N09.5    Acute diastolic CHF (congestive heart failure) ([facility]) I50.31    Neuromuscular disorder ([facility]) G70.9    Renovascular hypertension I15.0    Mixed hyperlipidemia E78.2    Cigarette nicotine dependence in remission F17.211    Pulmonary edema J81.1    Fluid overload E87.70    Anemia of chronic disease D63.8    SOB (shortness of breath) on exertion R06.02    Steal syndrome of dialysis vascular access [facility]) T82.898A    Chronic, continuous use of opioids F11.90    Chronic bronchitis ([facility]) J42    Nasal congestion R09.81    Hypercholesteremia E78.00    Bradycardia R00.1    History of transcatheter aortic valve replacement (TAVR) Z95.2    Syncope and collapse R55    PAF (paroxysmal atrial fibrillation) ([facility]) I48.0    Bilateral leg weakness R29.898    GBS (Guillain-[place] syndrome) ([facility]) G61.0    Sinus pause I45.5    Weakness of both lower extremities R29.898    Sinus bradycardia R00.1    Ataxia R27.0    Peripheral vascular occlusive disease ([facility]) I73.9    Cellulitis of right foot L03.115    Iliac artery occlusion, right ([facility]) I74.5    Cellulitis and abscess of hand L03.119, L02.519    Type 2 diabetes mellitus with hyperglycemia ([facility]) E11.65    Acute encephalopathy G93.40    Acute hypoxemic respiratory failure ([facility]) J96.01    Acute CVA (cerebrovascular accident) ([facility] I63.9    Speech problem R47.9    Urinary tract infection with hematuria N39.0, R31.9    Respiratory failure with hypoxia ([facility] J96.91    Acute respiratory failure with hypercapnia ([facility]) J96.02    Acute pulmonary edema ([facility]) J81.0    Grade II diastolic dysfunction H71.25    Shock circulatory ([facility]) R57.9    Smoker F17.200    Normocytic normochromic anemia D64.9    NSTEMI (non-ST elevated myocardial infarction) ([facility]) I21.4    SIRS (systemic inflammatory response syndrome) ([facility]) R65.10    Atrial flutter ([facility]) I48.92    Liberty-rectal abscess K61.1    Somnolence R40.0    Pulmonary edema with diastolic CHF, NYHA class 3 ([facility] I50.30    Respiratory failure ([facility] J96.90    Former smoker Z87.891    Cardiomyopathy ([facility] I42.9    Morbid obesity with BMI of 40.0-44.9, adult ([facility] E66.01, Z68.41    Hypoglycemia E16.2    Altered mental status R41.82    Hypertensive emergency I16.1    SOB (shortness of breath) R06.02    Acute respiratory failure with hypoxia and hypercapnia ([facility]) J96.01, J96.02    HFrEF (heart failure with reduced ejection fraction) ([facility]) I50.20    Pericardial effusion I31.39    Hypervolemia E87.70       Isolation/Infection:   Isolation            No Isolation          Patient Infection Status       Infection Onset Added Last Indicated Last Indicated By Review Planned Expiration Resolved Resolved By    None active    Resolved    COVID-19 (Rule Out) 10/18/22 10/18/22 10/18/22 COVID-19, Rapid (Ordered)   10/18/22 Rule-Out Test Resulted    COVID-19 (Rule Out) 09/23/22 09/23/22 09/23/22 COVID-19, Rapid (Ordered)   09/23/22 Rule-Out Test Resulted    COVID-19 (Rule Out) 08/02/22 08/02/22 08/02/22 COVID-19, Rapid (Ordered)   08/02/22 Rule-Out Test Resulted    COVID-19 (Rule Out) 07/05/22 07/05/22 07/06/22 SARS-CoV-2 NAAT (Rapid) (Ordered)   07/06/22 Rule-Out Test Resulted    COVID-19 (Rule Out) 05/29/22 05/29/22 05/29/22 COVID-19, Rapid (Ordered)   05/29/22 Rule-Out Test Resulted    COVID-19 (Rule Out) 04/18/22 04/18/22 04/18/22 COVID-19, Rapid (Ordered)   04/18/22 Rule-Out Test Resulted    COVID-19 (Rule Out) 02/25/22 02/25/22 02/25/22 COVID-19, Rapid (Ordered)   02/25/22 Rule-Out Test Resulted    COVID-19 (Rule Out) 01/12/22 01/12/22 01/12/22 COVID-19, Rapid (Ordered)   01/12/22 Rule-Out Test Resulted    COVID-19 (Rule Out) 11/29/21 11/29/21 11/29/21 COVID-19, Rapid (Ordered)   11/30/21 Rule-Out Test Resulted    COVID-19 (Rule Out) 08/29/21 08/29/21 08/29/21 COVID-19 (Ordered)   08/29/21 Rule-Out Test Resulted    COVID-19 (Rule Out) 12/18/20 12/18/20 12/18/20 COVID-19 (Ordered)   12/18/20 Rule-Out Test Resulted    COVID-19 (Rule Out) 05/04/20 05/04/20 05/04/20 COVID-19 (Ordered)   05/04/20 Rule-Out Test Resulted            Nurse Assessment:  Last Vital Signs: /72   Pulse 62   Temp 97.9 °F (36.6 °C) (Oral)   Resp 16   Ht 5' 9\" (1.753 m)   Wt 265 lb 14 oz (120.6 kg)   SpO2 97%   BMI 39.26 kg/m²     Last documented pain score (0-10 scale): Pain Level: 9  Last Weight:   Wt Readings from Last 1 Encounters:   10/20/22 265 lb 14 oz (120.6 kg)     Mental Status:  oriented and alert    IV Access:  - None    Nursing Mobility/ADLs:  Walking   Independent  Transfer  Independent  Bathing  Independent  Dressing  Independent  Toileting  Independent  Feeding  Independent  Med [address]  Independent  Med Delivery   whole    Wound Care Documentation and Therapy:  Wound 08/30/21 Toe (Comment  which one) Right Great Toe (Active)   Wound Image    10/05/22 1040   Wound Etiology Traumatic 10/05/22 1040   Dressing Status New dressing applied 10/05/22 1040   Wound Cleansed Irrigated with saline 10/05/22 1040   Dressing/Treatment Dry dressing;Roll gauze 10/05/22 1040   Dressing Change Due 10/06/22 10/05/22 1040   Wound Length (cm) 3.5 cm 10/05/22 1040   Wound Width (cm) 1.5 cm 10/05/22 1040   Wound Depth (cm) 0.1 cm 10/05/22 1040   Wound Surface Area (cm^2) 5.25 cm^2 10/05/22 1040   Change in Wound Size % (l*w) -3400 10/05/22 1040   Wound Volume (cm^3) 0.525 cm^3 10/05/22 1040   Wound Healing % -3400 10/05/22 1040   Distance Tunneling (cm) 0 cm 10/05/22 1040   Tunneling Position ___ O'Clock 0 10/05/22 1040   Undermining Starts ___ O'Clock 0 10/05/22 1040   Undermining Ends___ O'Clock 0 10/05/22 1040   Undermining Maxium Distance (cm) 0 10/05/22 1040   Wound Assessment Bleeding;Pink/red 10/19/22 1611   Drainage Amount Scant 10/19/22 1611   Drainage Description Serosanguinous; Sanguinous 10/05/22 1040   Odor None 10/19/22 1611   Liberty-wound Assessment Edematous;Fragile; Other (Comment) 10/05/22 1040   Margins Defined edges; Attached edges 10/05/22 1040   Wound Thickness Description not for Pressure Injury Full thickness 10/05/22 1040   Number of days: 415       Wound 01/12/22 Pedal Anterior;Right Healing scab from previous blister (per pt), flaky edges (Active)   Number of days: 281       Wound 01/12/22 Toe (Comment  which one) Anterior;Right Scab from old blister (per pt) on 4th toe, flaky edges (Active)   Number of days: 281       Incision 02/24/22 Perineum (Active)   Number of days: 237        Elimination:  Continence: Bowel: Yes  Bladder: Yes  Urinary Catheter: None   Colostomy/Ileostomy/Ileal Conduit: No       Date of Last BM: Unknown    Intake/Output Summary (Last 24 hours) at 10/20/2022 0932  Last data filed at 10/20/2022 0541  Gross per 24 hour   Intake 720 ml   Output 0 ml   Net 720 ml     I/O last 3 completed shifts: In: 1320 [P.O.:1320]  Out: 0     Safety Concerns:     None    Impairments/Disabilities:      None    Nutrition Therapy:  Current Nutrition Therapy:   Oral diet- renal     Routes of Feeding: Oral  Liquids: Thin Liquids  Daily Fluid Restriction: no  Last Modified Barium Swallow with Video (Video Swallowing Test): not done    Treatments at the Time of Hospital Discharge:   Respiratory Treatments:   Oxygen Therapy:  is on oxygen at 3 L/min per nasal cannula.   Ventilator:    - No ventilator support    Rehab Therapies:   Weight Bearing Status/Restrictions: No weight bearing restrictions  Other Medical Equipment (for information only, NOT a DME order):  None   Other Treatments:     Patient's personal belongings (please select all that are sent with patient):      RN SIGNATURE:  Electronically signed by Yoan Dumas RN on 10/20/22 at 3:13 PM EDT    CASE MANAGEMENT/SOCIAL WORK SECTION    Inpatient Status Date: ***    Readmission Risk Assessment Score:  Readmission Risk              Risk of Unplanned Readmission:  85           Discharging to Facility/ Agency   Name: [facility]  Phone:[phone]  [facility]:[phone]    Dialysis Facility (if applicable)   Name:[facility]  Dialysis Schedule: MWF      / signature: Electronically signed by Jarad Cruz RN on 10/20/22 at 1:04 PM EDT    PHYSICIAN SECTION    Prognosis: Fair    Condition at Discharge: [address] Patient Condition:[id number]}    Rehab Potential (if transferring to Rehab): Fair    Recommended Labs or Other Treatments After Discharge: Follow up with PCP within 1-2 weeks. HD as planned. Physician Certification: I certify the above information and transfer of Yousif Ar  is necessary for the continuing treatment of the diagnosis listed and that he requires [facility] for less 30 days.      Update Admission H&P: No change in H&P    PHYSICIAN SIGNATURE:  Electronically signed by Trish Ramos MD on 10/20/22 at 9:32 AM EDT

## 2022-10-20 NOTE — PROGRESS NOTES
Nephrology Progress Note   [facility]      This patient is a 45year old male whom we are following for ESKD. Subjective: The patient was seen and examined. Had HD last night with 3.6L off. On 3LPM O2 via nc. Family History: No family at bedside  ROS: No nausea or vomiting      Vitals:  /72   Pulse 62   Temp 97.9 °F (36.6 °C) (Oral)   Resp 16   Ht 5' 9\" (1.753 m)   Wt 265 lb 14 oz (120.6 kg)   SpO2 97%   BMI 39.26 kg/m²   I/O last 3 completed shifts: In: 1320 [P.O.:1320]  Out: 0   No intake/output data recorded. Physical Exam  Vitals reviewed. Constitutional:       General: He is not in acute distress. Appearance: Normal appearance. HENT:      Head: Normocephalic and atraumatic. Eyes:      General: No scleral icterus. Conjunctiva/sclera: Conjunctivae normal.   Cardiovascular:      Rate and Rhythm: Normal rate. Heart sounds: No friction rub. Pulmonary:      Effort: Pulmonary effort is normal. No respiratory distress. Breath sounds: Rales present. Abdominal:      General: Bowel sounds are normal. There is no distension. Tenderness: There is no abdominal tenderness. Musculoskeletal:      Right lower leg: Edema present. Left lower leg: Edema present. Neurological:      Mental Status: He is alert.      Access: [facility] TDC      Medications:   apixaban  2.5 mg Oral BID    b complex-C-folic acid  1 mg Oral Daily    calcium acetate  667 mg Oral TID WC    clopidogrel  75 mg Oral Daily    docusate sodium  100 mg Oral Daily    DULoxetine  30 mg Oral Daily    isosorbide dinitrate  20 mg Oral TID    linaclotide  145 mcg Oral QAM AC    metoprolol succinate  100 mg Oral BID    pantoprazole  40 mg Oral QAM AC    pravastatin  40 mg Oral Daily    QUEtiapine  50 mg Oral Nightly    sacubitril-valsartan  0.5 tablet Oral BID    tiotropium-olodaterol  2 puff Inhalation Daily    traZODone  100 mg Oral Nightly    heparin (porcine)  3,600 Units IntraCATHeter Once Labs:  Recent Labs     10/18/22  2158 10/20/22  0501   WBC 16.5* 8.0   HGB 11.5* 9.7*   HCT 37.2* 30.0*   MCV 90.5 89.6    223     Recent Labs     10/18/22  2158 10/19/22  0001 10/19/22  1925 10/20/22  0501   *  --  129* 131*   K 5.8* 5.9* 6.1* 5.4*   CL 92*  --  92* 92*   CO2 17*  --  22 23   GLUCOSE 238*  --  169* 200*   PHOS  --   --  6.4*  --    BUN 92*  --  108* 74*   CREATININE 9.6*  --  10.3* 7.0*   LABGLOM 6*  --  5* 9*           Assessment/Plan:    ESKD. - On HD MWF at [facility]. - Access: [facility] TDC.  - Prior TW of 103.5kg, last post weight outpatient though was 100.6kg.  - Had HD yesterday with post weight of 102.8kg. Acute Hypoxic Respiratory Failure. - From fluid overload. - Improving with fluid removal with HD.  - On supplemental O2. Hyperkalemia.  - From missed dialysis. - Improving with dialysis. - Low K+ diet. Anemia.  - Hgb 9.7g/dL, EPO with HD. Hypertension.  - Continue home meds. - Fluid removal with HD. Plan for discharge today noted. Please do not hesitate to contact me at [phone] if with questions. Thank you! Kervin Latif MD  [facility]  10/20/2022

## 2022-10-20 NOTE — CARE COORDINATION
[facility] has accepted and therapy has recommended SNF. Patient states he wants to go to rehab and assures writer he will not back out this time. My [employer] pre-cert for [employer]. [facility]  in [facility] submitting this AM. Should get approval today and be able to go. Per Patrick Sutton in admissions at [facility] he can come today as long as she can get transport worked out for HD tomorrow. She will let writer know once arranged. Arranging transport for later today in anticipation of receiving pre-cert today and getting HD transport worked out. No rapid covid needed.

## 2022-10-20 NOTE — PROGRESS NOTES
10/20/22 0304   NIV Type   NIV Started/Stopped On   Equipment Type v60   Mode Bilevel   Mask Type Full face mask   Mask Size Large   Settings/Measurements   IPAP 16 cmH20   CPAP/EPAP 6 cmH2O   Vt (Measured) 652 mL   Rate Ordered 12   Resp 15   FiO2  35 %   Mask Leak (lpm) 51 lpm   Comfort Level Good   Using Accessory Muscles No   Alarm Settings   Alarms On Y   Low Pressure (cmH2O) 5 cmH2O   High Pressure (cmH2O) 20 cmH2O THIS IS NOT AN OFFICE VISIT. THIS IS AN ABSTRACT ENCOUNTER CREATED IN PREPARATION OF AN UPCOMING VISIT, NOT TO BE USED FOR DOCUMENTATION/TREATMENT PURPOSES.      6 month FU AF, last seen 1/24/20 (Dayna)      Patricia Ahmadi is a 54 year old female sen in clinic for follow up of paroxysmal atrial fibrillation. She is managed on Disopyramide and on Aspirin for stoke prevention. She also has vasovagal syncope, previously on Midodrine, however discontinued due to elevated blood pressure. At last visit, discussed ablation in detail as patient voiced she did not like the idea of long term medication, however at the end discussed, opted to further discuss with her .         · Paroxysmal atrial fibrillation  ? Atypical: Abdominal pain  ? Diagnosed incidently 1/14/16  ? Initially seen in EP 7/30/19  ? Initiated on Norpace on 12/11/19 - improvement reported to palpitations   · High risk medication, Disopyramide 150 mg TID  ? Initiated in 2010, admitted on 12/11/19 for up-titration   · CHADSVASc 0    · On Aspirin for stroke prevention   · HX of [employer] Loop Recorder - now explanted (10/14/19)  ? Implanted 8/22/19 given history of syncope with paroxysmal atrial fibrillation and family history of unexplained sudden cardiac death.   ? Explanted 10/14/19 due to possible allergy or infection at device site  ? Contacted office 11/25/19 with pus like drainage, warm to touch and redness at device explant site, evaluated in office, started on Doxycycline  · Vasovagal syncope  ? Diagnosed with positive tilt table in 2010 (no documentation available)  ? HX of PRN Midodrine use. Per patient, discontinued due to elevated BP.   · Other Medical History  ? H/O gastric bypass  ? Anxiety/depression  ? Hyponatremia   ? Eczema  · EKG/Cardiac Monitoring  ? EKG 5/15/20: NSR   ? EKG 1/10/20: NSR rate 80, QRS 90 ms, QTc 456 ms  ? EKG 2/3/19: NSR  ? EKG 1/14/16: AF with RVR   · Cardiac Imaging  ? Echo 8/14/19: EF 59%, IVS 1.3, LVPW 1.0,  DOUGIE 24.9 ml/m²  ? Stress test 2/3/19: No convincing evidence of Regadenoson induced ischemia    ? Echo 1/14/16: EF 62%  · Labs   ? Cr 0.90, K+ 4.3 (5/15/20)  ? GFR >90, Mag 2.1 (12/13/19)  ? TSH 1.77( 2/9/16)

## 2022-10-20 NOTE — PROGRESS NOTES
Treatment time: 3    Net UF: 3600 ml    Pre weight: 106.3  Post weight: 102.8  EDW: TBD    Access used: LTCVC  Access function: good no issues    Medications or blood products given: none    Regular outpatient schedule: MWF    Summary of response to treatment: marcia well no acute distress    Copy of dialysis treatment record placed in chart, to be scanned into EMR.

## 2022-10-20 NOTE — CARE COORDINATION
CASE MANAGEMENT DISCHARGE SUMMARY      Discharge to: [facility]    Precertification completed: Yes  Hospital Exemption Notification (HENS) completed: Yes    IMM given: (date) 10/19/2022    New Durable Medical Equipment ordered/agency: defer to SNF     Transportation:      Medical Transport explained to pt/family. Pt/family voice no agency preference.   None  Agency used:[facility]   time:3:30 PM   Ambulance form completed: Yes    Confirmed discharge plan with:MD/RN/[facility]     Patient: yes                  Family: Notified spouse Jade Kohler Left        Facility/Agency, name:  [facility] faxed [phone]   Phone number for report to facility: [phone]     RN, name: Parisa Haji

## 2022-10-20 NOTE — PROGRESS NOTES
10/20/22 0001   NIV Type   $NIV $Daily Charge   NIV Started/Stopped On   Equipment Type v60   Mode Bilevel   Mask Type Full face mask   Mask Size Large   Settings/Measurements   IPAP 16 cmH20   CPAP/EPAP 6 cmH2O   Vt (Measured) 805 mL   Rate Ordered 12   Resp 18   FiO2  35 %   Mask Leak (lpm) 40 lpm   Comfort Level Good   Using Accessory Muscles No   Alarm Settings   Alarms On Y   Low Pressure (cmH2O) 5 cmH2O   High Pressure (cmH2O) 20 cmH2O

## 2022-10-20 NOTE — ACP (ADVANCE CARE PLANNING)
Advance Care Planning     Advance Care Planning Clinical Specialist  Conversation Note      Date of ACP Conversation: 10/20/2022    Conversation Conducted with: Patient with Decision Making Capacity    ACP Clinical Specialist: Kenyatta Valdivia RN      Healthcare Decision Maker:     Current Designated Healthcare Decision Maker:     Primary Decision Maker: Crystal Ann - Spouse - [phone]    Secondary Decision Maker: Natasha Mancini - Brother/Sister - [phone]        Care Preferences    Hospitalization: \"If your health worsens and it becomes clear that your chance of recovery is unlikely, what would your preference be regarding hospitalization? \"    Choice:  [] The patient wants hospitalization. [] The patient prefers comfort-focused treatment without hospitalization. Ventilation: \"If you were in your present state of health and suddenly became very ill and were unable to breathe on your own, what would your preference be about the use of a ventilator (breathing machine) if it were available to you? \"      If the patient would desire the use of ventilator (breathing machine), answer \"yes\". If not, \"no\": yes    \"If your health worsens and it becomes clear that your chance of recovery is unlikely, what would your preference be about the use of a ventilator (breathing machine) if it were available to you? \"     Would the patient desire the use of ventilator (breathing machine)?: No      Resuscitation  \"CPR works best to restart the heart when there is a sudden event, like a heart attack, in someone who is otherwise healthy. Unfortunately, CPR does not typically restart the heart for people who have serious health conditions or who are very sick. \"    \"In the event your heart stopped as a result of an underlying serious health condition, would you want attempts to be made to restart your heart (answer \"yes\" for attempt to resuscitate) or would you prefer a natural death (answer \"no\" for do not attempt to resuscitate)? \" yes

## 2022-10-25 LAB
AFB CULTURE (MYCOBACTERIA): NORMAL
AFB SMEAR: NORMAL

## 2022-10-31 RX ORDER — CARVEDILOL 12.5 MG/1
TABLET ORAL
Qty: 60 TABLET | Refills: 10 | OUTPATIENT
Start: 2022-10-31

## 2022-10-31 RX ORDER — CITALOPRAM 40 MG/1
TABLET ORAL
Qty: 30 TABLET | Refills: 10 | OUTPATIENT
Start: 2022-10-31

## 2022-10-31 RX ORDER — TRAZODONE HYDROCHLORIDE 150 MG/1
TABLET ORAL
Qty: 30 TABLET | Refills: 10 | OUTPATIENT
Start: 2022-10-31

## 2022-11-01 ENCOUNTER — OFFICE VISIT (OUTPATIENT)
Dept: CARDIOLOGY CLINIC | Age: 52
End: 2022-11-01
Payer: MEDICARE

## 2022-11-01 VITALS
WEIGHT: 235 LBS | HEIGHT: 69 IN | HEART RATE: 75 BPM | SYSTOLIC BLOOD PRESSURE: 110 MMHG | DIASTOLIC BLOOD PRESSURE: 72 MMHG | OXYGEN SATURATION: 98 % | BODY MASS INDEX: 34.8 KG/M2

## 2022-11-01 DIAGNOSIS — Z99.2 ESRD (END STAGE RENAL DISEASE) ON DIALYSIS (HCC): ICD-10-CM

## 2022-11-01 DIAGNOSIS — Z86.73 HISTORY OF CVA (CEREBROVASCULAR ACCIDENT): ICD-10-CM

## 2022-11-01 DIAGNOSIS — Q23.1 BICUSPID AORTIC VALVE: ICD-10-CM

## 2022-11-01 DIAGNOSIS — Z95.2 S/P TAVR (TRANSCATHETER AORTIC VALVE REPLACEMENT): ICD-10-CM

## 2022-11-01 DIAGNOSIS — Z99.89 OSA ON CPAP: ICD-10-CM

## 2022-11-01 DIAGNOSIS — E78.2 MIXED HYPERLIPIDEMIA: ICD-10-CM

## 2022-11-01 DIAGNOSIS — I48.0 PAF (PAROXYSMAL ATRIAL FIBRILLATION) (HCC): ICD-10-CM

## 2022-11-01 DIAGNOSIS — E11.9 TYPE 2 DIABETES MELLITUS WITHOUT COMPLICATION, WITHOUT LONG-TERM CURRENT USE OF INSULIN (HCC): ICD-10-CM

## 2022-11-01 DIAGNOSIS — G47.33 OSA ON CPAP: ICD-10-CM

## 2022-11-01 DIAGNOSIS — I25.5 ISCHEMIC CARDIOMYOPATHY: ICD-10-CM

## 2022-11-01 DIAGNOSIS — I25.10 CAD S/P PERCUTANEOUS CORONARY ANGIOPLASTY: Primary | ICD-10-CM

## 2022-11-01 DIAGNOSIS — Z98.61 CAD S/P PERCUTANEOUS CORONARY ANGIOPLASTY: Primary | ICD-10-CM

## 2022-11-01 DIAGNOSIS — I50.42 CHRONIC COMBINED SYSTOLIC AND DIASTOLIC HEART FAILURE (HCC): ICD-10-CM

## 2022-11-01 DIAGNOSIS — N18.6 ESRD (END STAGE RENAL DISEASE) ON DIALYSIS (HCC): ICD-10-CM

## 2022-11-01 PROCEDURE — 3074F SYST BP LT 130 MM HG: CPT | Performed by: NURSE PRACTITIONER

## 2022-11-01 PROCEDURE — 3078F DIAST BP <80 MM HG: CPT | Performed by: NURSE PRACTITIONER

## 2022-11-01 PROCEDURE — 99214 OFFICE O/P EST MOD 30 MIN: CPT | Performed by: NURSE PRACTITIONER

## 2022-11-01 PROCEDURE — 3051F HG A1C>EQUAL 7.0%<8.0%: CPT | Performed by: NURSE PRACTITIONER

## 2022-11-01 NOTE — PATIENT INSTRUCTIONS
Change Eliquis to 2.5 mg twice daily (renal dose)  Continue Toprol  mg twice daily, Entresto 24/26 mg half tablet twice daily and isosorbide 20 mg TID  Continue pravastatin 40 mg daily and clopidogrel 75 mg daily  Target dry weight ~ 103 kg  Fax recent blood test results to our office ([id number])  Follow up with me in 6-8 weeks

## 2022-11-01 NOTE — PROGRESS NOTES
[address]   Cardiac Evaluation    Primary Care Doctor:  Caty Lock MD    Chief Complaint   Patient presents with    Follow-Up from [address]     Currently has pneumonia    Chest Pain     Pt. States he is having constant chest tightness          Assessment:    1. CAD S/P percutaneous coronary angioplasty    2. Bicuspid aortic valve    3. S/P TAVR (transcatheter aortic valve replacement)    4. Ischemic cardiomyopathy    5. Chronic combined systolic and diastolic heart failure ([address]    6. Mixed hyperlipidemia    7. PAF (paroxysmal atrial fibrillation) ([facility]    8. Type 2 diabetes mellitus without complication, without long-term current use of insulin ([facility]    9. ESRD (end stage renal disease) on dialysis ([facility]    10. History of CVA (cerebrovascular accident)    6. ZAINAB on CPAP          Plan:   Change Eliquis to 2.5 mg twice daily (renal dose)  Continue Toprol  mg twice daily, Entresto 24/26 mg half tablet twice daily and isosorbide 20 mg TID  Continue pravastatin 40 mg daily and clopidogrel 75 mg daily  Target dry weight ~ 103 kg  Fax recent blood test results to our office ([id number])  Follow up with me in 6-8 weeks      Vitals:    11/01/22 1135   BP: 110/72   Site: Right Upper Arm   Pulse: 75   SpO2: 98%   Weight: 235 lb (106.6 kg)   Height: 5' 9\" (1.753 m)       History of Present Illness:   I had the pleasure of seeing Zohreh Wang in follow up for recent hospitalization. Zohreh Wang has been hospitalized multiple times for shortness of breath and chest pain, mostly due to missing HD or hypertension. Hx of CAD s/p PCI to LAD in 2015, RCA 1/2022, AS s/p TAVR 2019, ICM, s/d CHF, A. Fib (Eliquis), HYPERTENSION, HLD, DM, PAD s/p PTA R iliac artery, ESRD on HD, chronic anemia, ZAINAB on CPAP, COPD, and hx of CVA. He feels terrible all over. He is now at [facility]. He states now has PNA. + cough and runny nose.  Productive cough of brownish sputum. He has a chest discomfort like something sitting on his chest. Ongoing for about 1 week, constant. Coughing makes it worse. He has been able to get to HD 3 days per week now. His prior target weight was 103.5 kg but after HD during last hospitalization was down to 102.8kg. Today he is 106.6 kg. Still urinates a little bit. Joe Gordon describes symptoms including chest pain, dyspnea, fatigue, cough but denies palpitations, orthopnea, PND, early saiety, edema, syncope. NYHA:   III  ACC/ AHA Stage:    C    Past Medical History:   has a past medical history of Ambulatory dysfunction, Aortic stenosis, Arthritis, Asthma, Bilateral hilar adenopathy syndrome, CAD (coronary artery disease), Cardiomyopathy ([address] CHF (congestive heart failure) ([address] Chronic pain, COPD (chronic obstructive pulmonary disease) ([address] Depression, Diabetes mellitus ([address] Difficult intravenous access, Emphysema of lung ([address] ESRD (end stage renal disease) on dialysis ([address] Fear of needles, Gastric ulcer, GERD (gastroesophageal reflux disease), Heart valve problem, Hemodialysis patient ([address] History of spinal fracture, Hx of blood clots, Hyperlipidemia, Hypertension, MI (myocardial infarction) ([address] Neuromuscular disorder ([address] Numbness and tingling in left arm, Pneumonia, PONV (postoperative nausea and vomiting), Prolonged emergence from general anesthesia, Sleep apnea, Stroke ([address] TIA (transient ischemic attack), and Unspecified diseases of blood and blood-forming organs. Surgical History:   has a past surgical history that includes Tonsillectomy; cyst removal (08/14/2013); Colonoscopy; Coronary angioplasty with stent (05/26/15); vascular surgery (aprx 2 years ago); Colonoscopy (2/29/2015); Upper gastrointestinal endoscopy (01/06/2016); Upper gastrointestinal endoscopy (01/29/2017); other surgical history (02/01/2017); Dialysis fistula creation (Left, 10/30/2017);  Diagnostic Cardiac Cath Lab Procedure; other surgical history (2018); other surgical history (Bilateral, 06/26/2018); pr lap insertion tunneled intraperitoneal catheter (N/A, 9/21/2018); other surgical history (Right, 09/2018); Dialysis fistula creation (Left, 3/27/2019); other surgical history (05/28/2019); Endoscopy, colon, diagnostic; Catheter Removal (Right, 7/2/2019); Aortic valve replacement (N/A, 10/15/2019); Rectal surgery (N/A, 2/24/2022); IR TUNNELED CVC PLACE WO SQ PORT/PUMP > 5 YEARS (3/21/2022); IR TUNNELED CVC PLACE WO SQ PORT/PUMP > 5 YEARS (4/21/2022); IR TUNNELED CVC PLACE WO SQ PORT/PUMP > 5 YEARS (4/26/2022); IR TUNNELED CVC PLACE WO SQ PORT/PUMP > 5 YEARS (6/23/2022); and thoracentesis (N/A, 10/2/2022). Social History:   reports that he quit smoking about 2 years ago. His smoking use included cigarettes. He has a 16.50 pack-year smoking history. He has never used smokeless tobacco. He reports that he does not currently use alcohol. He reports that he does not use drugs. Family History:   Family History   Problem Relation Age of Onset    Diabetes Mother     Heart Disease Father     Kidney Disease Sister         stage 4-kidney failure    Cancer Sister     Heart Disease Sister     Obesity Sister     Cancer Sister     Heart Disease Sister     Obesity Sister     Alcohol Abuse Brother        Home Medications:  Prior to Admission medications    Medication Sig Start Date End Date Taking?  Authorizing Provider   sacubitril-valsartan (ENTRESTO) 24-26 MG per tablet Take 0.5 tablets by mouth 2 times daily 10/5/22 11/4/22 Yes Judi Mckinney MD   metoprolol succinate (TOPROL XL) 100 MG extended release tablet Take 1 tablet by mouth in the morning and at bedtime 7/21/22  Yes Tejinder Kyle MD   cyclobenzaprine (FLEXERIL) 5 MG tablet Muscle spasms 7/21/22  Yes Tejinder Kyle MD   QUEtiapine (SEROQUEL) 50 MG tablet TAKE 1 TABLET BY MOUTH EVERY EVENING 6/30/22  Yes Young Bonilla MD   isosorbide dinitrate (ISORDIL) 20 MG tablet Take 1 tablet by mouth 3 times daily 6/8/22  Yes JASE Hussein   clopidogrel (PLAVIX) 75 MG tablet Take 1 tablet by mouth daily 5/9/22  Yes JAY Faye CNP   pantoprazole (PROTONIX) 40 MG tablet TAKE 1 TABLET BY MOUTH EVERY MORNING BEFORE BREAKFAST 4/28/22  Yes Tommy Mccurdy MD   docusate sodium (DOK) 100 MG capsule Take 1 capsule by mouth daily  Patient taking differently: Take 100 mg by mouth as needed 4/27/22  Yes Gwendolyn Mathews MD   DULoxetine (CYMBALTA) 30 MG extended release capsule TAKE 1 CAPSULE BY MOUTH EVERY DAY 3/29/22  Yes Tommy Mccurdy MD   sennosides-docusate sodium (SENOKOT-S) 8.6-50 MG tablet Take 1 tablet by mouth daily  Patient taking differently: Take 1 tablet by mouth as needed 3/9/22  Yes Tommy Mccurdy MD   apixaban (ELIQUIS) 5 MG TABS tablet Take 1 tablet by mouth 2 times daily 3/3/22  Yes Mellissa Dhaliwal MD   pravastatin (PRAVACHOL) 40 MG tablet Take 1 tablet by mouth daily 2/10/22  Yes JAY Faye CNP   B Complex-C-Folic Acid (VIRT-CAPS) 1 MG CAPS TAKE 1 CAPSULE BY MOUTH EVERY DAY 9/20/21  Yes Tommy Mccurdy MD   Calcium Acetate, Phos Binder, 667 MG CAPS TAKE 1 CAPSULE BY MOUTH THREE TIMES DAILY WITH MEALS 8/12/21  Yes Tommy Mccurdy MD   nitroGLYCERIN (NITROSTAT) 0.4 MG SL tablet DISSOLVE 1 TABLET UNDER THE TONGUE AS NEEDED FOR CHEST PAIN EVERY 5 MINUTES UP TO 3 TIMES. IF NO RELIEF CALL 911. 1/7/21  Yes Tommy Mccurdy MD   vitamin D (ERGOCALCIFEROL) 60788 units CAPS capsule TK 1 C PO WEEKLY 6/2/19  Yes Historical Provider, MD   Alcohol Swabs PADS USE AS DIRECTED 4/25/18  Yes Royce Baugh MD   ipratropium-albuterol (DUONEB) 0.5-2.5 (3) MG/3ML SOLN nebulizer solution Inhale 3 mLs into the lungs every 6 hours as needed for Shortness of Breath 10/15/17  Yes Mellissa Dhaliwal MD   calcium carbonate (TUMS) 500 MG chewable tablet Take 1 tablet by mouth 3 times daily as needed for Heartburn.    Yes Historical Provider, MD   gabapentin (NEURONTIN) 100 MG capsule Take 2 capsules by mouth 3 times daily as needed (neuropathic pain) for up to 10 days. 10/20/22 10/30/22  Nancy Arreguin MD   traZODone (DESYREL) 100 MG tablet Take 100 mg by mouth nightly  Patient not taking: Reported on 11/1/2022    Historical Provider, MD   mineral oil liquid Take 30 mLs by mouth daily as needed for Constipation  Patient not taking: Reported on 11/1/2022 3/9/22   Rogelio Flores MD   Continuous Blood Gluc Sensor (DEXCOM G6 SENSOR) MISC Every 10 days  Patient not taking: Reported on 11/1/2022 10/5/21   Rogelio Flores MD   Continuous Blood Gluc Transmit (DEXCOM G6 TRANSMITTER) MISC 1 each by Does not apply route every 3 months  Patient not taking: Reported on 11/1/2022 10/5/21   Rogelio Flores MD   Continuous Blood Gluc  ([address]) [address] 1 each by Does not apply route Daily with lunch  Patient not taking: Reported on 11/1/2022 10/5/21   Rogelio Flores MD   Tiotropium Bromide-Olodaterol (STIOLTO RESPIMAT) 2.5-2.5 MCG/ACT AERS Inhale 2 puffs into the lungs daily  Patient not taking: Reported on 11/1/2022 5/21/19   Roberto Remy MD   Blood Glucose Monitoring Suppl DAAM USE AS DIRECTED. Patient not taking: Reported on 11/1/2022 4/25/18   Stefania Blackmon MD   albuterol sulfate  (90 Base) MCG/ACT inhaler Inhale 2 puffs into the lungs every 6 hours as needed for Wheezing  Patient not taking: Reported on 11/1/2022 11/8/17   Omero Santillan MD        Allergies:  Morphine     Physical Examination:    Vitals:    11/01/22 1135   BP: 110/72   Site: Right Upper Arm   Pulse: 75   SpO2: 98%   Weight: 235 lb (106.6 kg)   Height: 5' 9\" (1.753 m)      Constitutional and General Appearance: Warm and dry, no apparent distress, pale  HEENT:  Normocephalic, atraumatic  Respiratory:  Normal excursion and expansion without use of accessory muscles  Resp Auscultation: Normal breath sounds without dullness  Cardiovascular:   The apical impulses not displaced  Heart tones are crisp and normal  JVP difficult to assess  Regular rate and rhythm, normal S1S2, + murmur, no g/r  Peripheral pulses are symmetrical and full  There is no clubbing, cyanosis of the extremities.   No BLE edema  Pedal Pulses: 2+ and equal     Abdomen:  No masses or tenderness, soft, non-distended  Liver/Spleen: No Abnormalities Noted  Neurological/Psychiatric:  Alert and oriented in all spheres  Moves all extremities well  Exhibits normal gait balance and coordination  No abnormalities of mood, affect, memory, mentation, or behavior are noted    Lab Data:  CBC:   Lab Results   Component Value Date/Time    WBC 8.0 10/20/2022 05:01 AM    WBC 16.5 10/18/2022 09:58 PM    WBC 8.0 10/05/2022 11:53 AM    RBC 3.35 10/20/2022 05:01 AM    RBC 4.11 10/18/2022 09:58 PM    RBC 3.53 10/05/2022 11:53 AM    HGB 9.7 10/20/2022 05:01 AM    HGB 11.5 10/18/2022 09:58 PM    HGB 9.8 10/05/2022 11:53 AM    HCT 30.0 10/20/2022 05:01 AM    HCT 37.2 10/18/2022 09:58 PM    HCT 31.2 10/05/2022 11:53 AM    MCV 89.6 10/20/2022 05:01 AM    MCV 90.5 10/18/2022 09:58 PM    MCV 88.5 10/05/2022 11:53 AM    RDW 16.6 10/20/2022 05:01 AM    RDW 16.7 10/18/2022 09:58 PM    RDW 16.5 10/05/2022 11:53 AM     10/20/2022 05:01 AM     10/18/2022 09:58 PM     10/05/2022 11:53 AM     BMP:  Lab Results   Component Value Date/Time     10/20/2022 05:01 AM     10/19/2022 07:25 PM     10/18/2022 09:58 PM    K 5.4 10/20/2022 05:01 AM    K 6.1 10/19/2022 07:25 PM    K 5.9 10/19/2022 12:01 AM    K 5.8 10/18/2022 09:58 PM    K 5.7 10/05/2022 11:53 AM    K 4.6 09/30/2022 05:02 AM    CL 92 10/20/2022 05:01 AM    CL 92 10/19/2022 07:25 PM    CL 92 10/18/2022 09:58 PM    CO2 23 10/20/2022 05:01 AM    CO2 22 10/19/2022 07:25 PM    CO2 17 10/18/2022 09:58 PM    PHOS 6.4 10/19/2022 07:25 PM    PHOS 7.0 10/05/2022 11:53 AM    PHOS 5.7 10/04/2022 02:25 PM    BUN 74 10/20/2022 05:01 AM     10/19/2022 07:25 PM    BUN 92 10/18/2022 09:58 PM    CREATININE 7.0 10/20/2022 05:01 AM    CREATININE 10.3 10/19/2022 07:25 PM    CREATININE 9.6 10/18/2022 09:58 PM     BNP:   Lab Results   Component Value Date/Time    PROBNP >70,000 10/18/2022 09:58 PM    PROBNP 36,381 10/02/2022 05:12 AM    PROBNP 58,123 09/29/2022 07:54 AM     Troponin: No components found for: TROPONIN  Lipids:   Lab Results   Component Value Date/Time    TRIG 76 07/17/2022 05:46 AM    TRIG 213 03/18/2022 06:57 AM    HDL 52 07/17/2022 05:46 AM    HDL 38 03/18/2022 06:57 AM    LDLCALC 80 07/17/2022 05:46 AM    LDLCALC 86 03/18/2022 06:57 AM    LDLDIRECT 199 09/04/2014 10:33 AM    LDLDIRECT 172 03/07/2014 09:37 AM     Cardiac Imaging:  Echo 9/28/22   Summary   Patient refused use of Definity contrast.   Limited only f/u for LVEF and s/p TAVR. The left ventricular systolic function appears severely reduced with visually estimated ejection fraction of 20-25%. Poor endocardial visualization. By history, there is a #29 mm Langford Leyda S3 bioprosthetic valve in the aortic position. The valve appears well seated with no rocking motion. Valve leaflets are not well-evaluated. Normal Doppler values. No evidence of aortic valve regurgitation. Pericardial effusion, noted darrick-apically where measures up to 1.5 cm, anteriorly along right ventricle, with exudate. Appears likely trace along lateral wall. Difficult to discern apically/laterally however, whether pericardial or tracking pleural effusion. No tamponade physiology. There is a large left pleural effusion. Non-contrasted CT is suggested for further characterization of pericardial versus pleural effusion.     CARDIAC CATH 6/7/2022:  FINDINGS      HEMODYNAMICS   CHAMBER PRESSURE SATURATION   RA  10 60%   RV  47/9     PA  50/20  67%   PW  19     AORTA  101/50  95%      NANCY CARDIAC OUTPUT  5.8 L/min   SVR  756    PVR  234      LVGRAM   LVEDP  21   GRADIENT ACROSS AORTIC VALVE  less than 5 mmHg   LV FUNCTION EF 20%   WALL MOTION severe global hypokinesis with regional variation   MITRAL REGURGITATION  mild     CORONARY ARTERIES   LM Less than 10% [id number] stenosis            LAD Moderately calcified, 20 to 30% proximal-mid stenosis, distal vessel tapers at the apex with 60% diffuse disease. D1 has an ostial/proximal 75 to 80% stenosis. LCX  Calcified, proximal-mid 75 to 80% stenosis. Distal vessel is tortuous with 70 to 85% diffuse stenosis with more discrete stenosis noted distally. OM 1 is a very small vessel with ostial/proximal 80% stenosis and 60 to 70% diffuse disease in the prxholde-dks-vsmlen vessel. Martha Ceron RCA Dominant, calcified, tortuous, there is a proximal-mid stent with 60 to 70% in-stent restenosis. Distal vessel 20 to 30% stenosis         CONCLUSIONS:      Mild to moderately increased filling pressures  Patent RCA stent with moderate to borderline severe in-stent restenosis  Severe circumflex and D1 stenosis  Continue medical management, consider outpatient high risk PCI of above territories pending outpatient clinical follow-up. Currently medical management seems best given comorbidities and need for additional fluid removal.  If PCI is pursued, will likely need general anesthesia. Patient had difficulty lying still on Cath Lab table. Okay to resume Eliquis tomorrow, case/plan/findings discussed with patient and wife, they understand/agree, they stated it is incorrectly stated in chart the patient would refuse blood, he is okay to receive blood products if needed. Continue beta-blocker and Entresto      ECHO 6/3/22:    Summary    Definity® used for myocardial border enhancement. Left ventricular systolic function is severely reduced with a visually estimated ejection fraction of 20-25 %. EF estimated by Jasmine's method at 24 %. The left ventricle is mildly dilated in size with normal wall thickness. Severe global hypokinesis with regional variation.     Grade I diastolic dysfunction with normal LV pressure. There is no evidence of a left ventricular thrombus. Right ventricular systolic function is reduced. A 29 mm Langford Leyda S3 bioprosthetic aortic valve appears well seated with normal Doppler values. Inadequate tricuspid valve regurgitation to estimate systolic pulmonary artery pressure. There is a moderate left pleural effusion noted. [facility] nuclear stress test 6/2/2022:   Severe LV dysfunction EF 26%    Global hypokinesis with regional variation, more pronounced in inferolateral    wall and apex    Large mainly fixed defect in inferior wall, extends to portions of lateral    wall and apex with moderate darrick-infarct ischemia        Overall, this would be considered an abnormal, higher risk, study           Coronary angiogram 1/14/2022:  1. Left heart catheterization  2. Selective left and right coronary angiogram  3. PCI of the RCA with PATRICIA  Procedure Findings:  1. 99% mid RCA  2. 60-70% CIRC and DIAG  3. Elevated left heart hemodynamics              ~LVEDP 30       Details:              Domenico Evans was brought to the cardiac catheterization lab in a fasting state after informed consent was obtained. If the patient was able to provide written consent, it was obtained. The patient's vitals were monitored through out the procedure. The patient was sedated using the appropriate levels of sedation and ASA guidelines. The appropriate access site area was prepped and drapped in a sterile fashion. The area was anesthetized with 2% lidocaine. Using the modified Seldinger technique, an arterial sheath was introduced into the arterial access site using a single anterior wall puncture. The sheath was flushed and prepped in usual fashion. Catheters used during the procedure included 5 Nepali TIG 4.0 catheter. The catheters were advanced and removed over a .035\" wire, into the appropriate positions.  Multiple angiographic views were obtained. An LV gram was not obtained. ~The interventional portion of the procedure was completed utilizing a multipurpose 6 [place] guide. Multipurpose guide was advanced into the right coronary ostium. A gentleman therapy aspirin, Plavix, Angiomax was initiated. A BMW wire was advanced into the distal right coronary artery. The lesion was initially predilated with a 2.75 x 15 mm Euphora balloon followed by a 3.5 mm x 20 mm Euphora balloon and subsequently stented with a resolute Willam 4.0 mm x 38 mm balloon. We did experience a no reflow phenomenon. We separately done. A twin pass catheter and then did local drug delivery with 200 mcg of nitroprusside and 200 mcgnitroglycerin. Provided restoration of SCARLETT-3 flow. Findings:  1. Left main coronary artery was normal. It gave off the left anterior descending artery and left circumflex. 2. Left anterior descending artery has 60% severe atherosclerotic disease. It was moderate in size. It gave off septal perforators and a moderate sized diagonal branch. The LAD covered the entire apex of the left ventricle. 3. Left circumflex has 60% severe atherosclerotic disease. It was moderate in size. There was a moderate sized obtuse marginal branch. 4. Right coronary artery has 99% severe atherosclerotic disease. It was moderate in size and was the dominant artery. CONCLUSIONS:  1. Apercutaneous coronary intervention of the right coronary artery utilizing a single resolute Willam drug-eluting stent  ASSESSMENT/RECOMMENDATIONS:  1. Dual antiplatelet therapy  2. Medical management of the remaining coronary disease     Echo 1/2022:   Limited only f/u for LVEF and RVF. The left ventricular systolic function is mildly reduced with an ejection fraction of 40-45 %. There is hypokinesis of the apex and inferior walls. There is a very small circumferential pericardial effusion noted. No tamponade physiology.    The right ventricle is normal in size and function. Echo 7/9/2020:  Low Normal systolic function with an estimated ejection fraction of 50%. Left ventricular function and wall motion is difficult to estimate due to   poor endocardial visualization. Grade I diastolic dysfunction with normal filing pressure. Normally functioning TAVR 29 mm Langford Leyda S3 bioprosthetic valve in   aortic position appears well seated with a max velocity of 2.11 m/sec,   maximum gradient of 18 mmHg and a mean gradient of 8 mmHg. There is no evidence of aortic regurgitation    Coronary angiogram 8/22/2019:  LM <20%  LAD patent stent  Cx <20%  RCA 30%  Severe AS by echo  Proceed w/ TAVR    Echo 1/24/2017:  Summary   Left ventricular systolic function is normal with an ejection fraction of 55-60%. No wall motion abnormalities noted. Mild concentric left ventricular hypertrophy. Grade II diastolic dysfunction with elevated filling pressure. Mildly dilated left atrium. Moderate aortic stenosis. Mild aortic regurgitation. Compared to previous study from 10/27/2016, aortic stenosis has not progressed. R/LHC 1/23/2017:  LM <20%  LAD 30% w/ patent stent  Cx 20-30%  RCA 20-30%  LVEDP 19  RA 8, RV 41/10, PA 45/14/31, W 15      Nonobstructive CAD  Mild elevated right heart pressures        Echo Oct 2016:   Normal left ventricle size with mild concentric left ventricular hypertrophy. Normal systolic function with an estimated ejection fraction of 55%. No regional wall motion abnormalities are seen. Elevated left ventricular diastolic filling pressure ( E/e' 21 ). The aortic valve is thickened/calcified with decreased leaflet mobility. Cannot exclude a bicuspid valve. The aortic valve area is calculated at 1.0 cm2 with a max velocity of 3.36 m/sec, maximum pressure gradient of 45 mmHg and a mean pressure gradient of 23 mmHg. This is c/w moderate aortic stenosis. Color flow demonstrates eccentric jet suggesting mild aortic regurgitation. Trace mitral and tricuspid regurgitation. Systolic pulmonary artery pressure (SPAP) is normal and estimated at 22 mmHg (RA pressure 3 mmHg). There is mild concentric left ventricular hypertrophy. MPI 6/18/15: There is a very small and mild fixed posterior-basal defect consistent with  fibrosis. There is no evidence for ischemia. Left ventricular function is reduced with and estimated LVEF of 40%. The LV is severely dilated.         I appreciate the opportunity of cooperating in the care of this individual.    Efra West, JAY - CNP, 11/1/2022, 11:54 AM

## 2022-11-09 ENCOUNTER — TELEPHONE (OUTPATIENT)
Dept: CARDIOLOGY CLINIC | Age: 52
End: 2022-11-09

## 2022-11-09 NOTE — TELEPHONE ENCOUNTER
Jasmin from [facility] stated that pt is currently at [facility] and is being d/c on 11/10/2022. Graciela Roy stated that pt needs home health care orders and was checking to see if npdd would be willing to sign for them. Pt is no longer seeing his pcp Dr. Dana Montgomery. Does pt need to follow up with a pcp for these orders? Graciela Roy can be reached at [phone]. Please advise.

## 2022-11-10 NOTE — TELEPHONE ENCOUNTER
LMOM relaying NPDD message to [facility]. HHN is to contact the office with concerns.

## 2022-11-15 DIAGNOSIS — K59.04 CHRONIC IDIOPATHIC CONSTIPATION: ICD-10-CM

## 2022-11-15 NOTE — TELEPHONE ENCOUNTER
Last Office Visit  -  8/5/22  Next Office Visit  -  n/a    Last Filled  -  4/27/22  Last UDS -    Contract -

## 2022-11-15 NOTE — TELEPHONE ENCOUNTER
Pharmacy requesting refill  linaclotide (LINZESS) capsule 145 mcg     Last Office Visit  -  8/5/22  Next Office Visit  -  none

## 2022-11-17 NOTE — PROGRESS NOTES
Patient educated on facility's new visitor policy beginning today, 12/18 at 1900. Patient verbalized understanding and denied any new questions or concerns at this time. Will continue to monitor situation. PAST MEDICAL HISTORY:  Gastric bypass status for obesity s/p Lap band    Hyperlipidemia     Hypertension     Obesity     Sleep apnea

## 2022-11-25 ENCOUNTER — HOSPITAL ENCOUNTER (INPATIENT)
Age: 52
LOS: 3 days | Discharge: HOME HEALTH | DRG: 193 | End: 2022-11-28
Attending: EMERGENCY MEDICINE | Admitting: INTERNAL MEDICINE
Payer: MEDICARE

## 2022-11-25 ENCOUNTER — APPOINTMENT (OUTPATIENT)
Dept: GENERAL RADIOLOGY | Age: 52
DRG: 193 | End: 2022-11-25
Payer: MEDICARE

## 2022-11-25 DIAGNOSIS — K59.00 CONSTIPATION, UNSPECIFIED CONSTIPATION TYPE: ICD-10-CM

## 2022-11-25 DIAGNOSIS — J96.20 ACUTE ON CHRONIC RESPIRATORY FAILURE, UNSPECIFIED WHETHER WITH HYPOXIA OR HYPERCAPNIA (HCC): Primary | ICD-10-CM

## 2022-11-25 DIAGNOSIS — J18.9 ACUTE PNEUMONIA: ICD-10-CM

## 2022-11-25 DIAGNOSIS — J18.9 PNEUMONIA OF LEFT LUNG DUE TO INFECTIOUS ORGANISM, UNSPECIFIED PART OF LUNG: ICD-10-CM

## 2022-11-25 DIAGNOSIS — Z99.2 STAGE 5 CHRONIC KIDNEY DISEASE ON CHRONIC DIALYSIS (HCC): ICD-10-CM

## 2022-11-25 DIAGNOSIS — N18.6 STAGE 5 CHRONIC KIDNEY DISEASE ON CHRONIC DIALYSIS (HCC): ICD-10-CM

## 2022-11-25 DIAGNOSIS — J90 PLEURAL EFFUSION: ICD-10-CM

## 2022-11-25 LAB
A/G RATIO: 1.1 (ref 1.1–2.2)
ALBUMIN SERPL-MCNC: 3.9 G/DL (ref 3.4–5)
ALP BLD-CCNC: 78 U/L (ref 40–129)
ALT SERPL-CCNC: 9 U/L (ref 10–40)
ANION GAP SERPL CALCULATED.3IONS-SCNC: 17 MMOL/L (ref 3–16)
AST SERPL-CCNC: 8 U/L (ref 15–37)
BASE EXCESS ARTERIAL: 0.2 MMOL/L (ref -3–3)
BASOPHILS ABSOLUTE: 0.1 K/UL (ref 0–0.2)
BASOPHILS RELATIVE PERCENT: 0.5 %
BILIRUB SERPL-MCNC: 0.3 MG/DL (ref 0–1)
BUN BLDV-MCNC: 71 MG/DL (ref 7–20)
CALCIUM SERPL-MCNC: 8.1 MG/DL (ref 8.3–10.6)
CARBOXYHEMOGLOBIN ARTERIAL: 1.5 % (ref 0–1.5)
CHLORIDE BLD-SCNC: 93 MMOL/L (ref 99–110)
CO2: 24 MMOL/L (ref 21–32)
CREAT SERPL-MCNC: 8.5 MG/DL (ref 0.9–1.3)
EKG ATRIAL RATE: 87 BPM
EKG DIAGNOSIS: NORMAL
EKG P AXIS: 2 DEGREES
EKG P-R INTERVAL: 176 MS
EKG Q-T INTERVAL: 406 MS
EKG QRS DURATION: 90 MS
EKG QTC CALCULATION (BAZETT): 488 MS
EKG R AXIS: 9 DEGREES
EKG T AXIS: 135 DEGREES
EKG VENTRICULAR RATE: 87 BPM
EOSINOPHILS ABSOLUTE: 0.2 K/UL (ref 0–0.6)
EOSINOPHILS RELATIVE PERCENT: 1.6 %
GFR SERPL CREATININE-BSD FRML MDRD: 7 ML/MIN/{1.73_M2}
GLUCOSE BLD-MCNC: 121 MG/DL (ref 70–99)
GLUCOSE BLD-MCNC: 203 MG/DL (ref 70–99)
GLUCOSE BLD-MCNC: 221 MG/DL (ref 70–99)
GLUCOSE BLD-MCNC: 221 MG/DL (ref 70–99)
HCO3 ARTERIAL: 26.9 MMOL/L (ref 21–29)
HCT VFR BLD CALC: 28.8 % (ref 40.5–52.5)
HEMOGLOBIN, ART, EXTENDED: 9.3 G/DL (ref 13.5–17.5)
HEMOGLOBIN: 9.4 G/DL (ref 13.5–17.5)
INFLUENZA A: NOT DETECTED
INFLUENZA B: NOT DETECTED
LACTIC ACID: 0.8 MMOL/L (ref 0.4–2)
LYMPHOCYTES ABSOLUTE: 0.6 K/UL (ref 1–5.1)
LYMPHOCYTES RELATIVE PERCENT: 5.5 %
MAGNESIUM: 1.9 MG/DL (ref 1.8–2.4)
MCH RBC QN AUTO: 29.3 PG (ref 26–34)
MCHC RBC AUTO-ENTMCNC: 32.8 G/DL (ref 31–36)
MCV RBC AUTO: 89.5 FL (ref 80–100)
METHEMOGLOBIN ARTERIAL: 0.5 %
MONOCYTES ABSOLUTE: 0.6 K/UL (ref 0–1.3)
MONOCYTES RELATIVE PERCENT: 5 %
NEUTROPHILS ABSOLUTE: 9.6 K/UL (ref 1.7–7.7)
NEUTROPHILS RELATIVE PERCENT: 87.4 %
O2 SAT, ARTERIAL: 35.4 %
O2 THERAPY: ABNORMAL
PCO2 ARTERIAL: 55 MMHG (ref 35–45)
PDW BLD-RTO: 16.6 % (ref 12.4–15.4)
PERFORMED ON: ABNORMAL
PH ARTERIAL: 7.31 (ref 7.35–7.45)
PLATELET # BLD: 377 K/UL (ref 135–450)
PMV BLD AUTO: 7.4 FL (ref 5–10.5)
PO2 ARTERIAL: 25.6 MMHG (ref 75–108)
POTASSIUM REFLEX MAGNESIUM: 5.8 MMOL/L (ref 3.5–5.1)
PRO-BNP: ABNORMAL PG/ML (ref 0–124)
PROCALCITONIN: 0.47 NG/ML (ref 0–0.15)
RBC # BLD: 3.21 M/UL (ref 4.2–5.9)
SARS-COV-2 RNA, RT PCR: NOT DETECTED
SODIUM BLD-SCNC: 134 MMOL/L (ref 136–145)
SPECIMEN STATUS: NORMAL
TCO2 ARTERIAL: 28.6 MMOL/L
TOTAL PROTEIN: 7.6 G/DL (ref 6.4–8.2)
TROPONIN: 0.18 NG/ML
TROPONIN: 0.19 NG/ML
WBC # BLD: 11 K/UL (ref 4–11)

## 2022-11-25 PROCEDURE — 84484 ASSAY OF TROPONIN QUANT: CPT

## 2022-11-25 PROCEDURE — 2580000003 HC RX 258: Performed by: INTERNAL MEDICINE

## 2022-11-25 PROCEDURE — 96367 TX/PROPH/DG ADDL SEQ IV INF: CPT

## 2022-11-25 PROCEDURE — 6360000002 HC RX W HCPCS

## 2022-11-25 PROCEDURE — 96375 TX/PRO/DX INJ NEW DRUG ADDON: CPT

## 2022-11-25 PROCEDURE — 94660 CPAP INITIATION&MGMT: CPT

## 2022-11-25 PROCEDURE — 96365 THER/PROPH/DIAG IV INF INIT: CPT

## 2022-11-25 PROCEDURE — 2700000000 HC OXYGEN THERAPY PER DAY

## 2022-11-25 PROCEDURE — 83735 ASSAY OF MAGNESIUM: CPT

## 2022-11-25 PROCEDURE — 6370000000 HC RX 637 (ALT 250 FOR IP): Performed by: PHYSICIAN ASSISTANT

## 2022-11-25 PROCEDURE — 84145 PROCALCITONIN (PCT): CPT

## 2022-11-25 PROCEDURE — 6370000000 HC RX 637 (ALT 250 FOR IP): Performed by: INTERNAL MEDICINE

## 2022-11-25 PROCEDURE — 71045 X-RAY EXAM CHEST 1 VIEW: CPT

## 2022-11-25 PROCEDURE — 94640 AIRWAY INHALATION TREATMENT: CPT

## 2022-11-25 PROCEDURE — 2060000000 HC ICU INTERMEDIATE R&B

## 2022-11-25 PROCEDURE — 5A1D70Z PERFORMANCE OF URINARY FILTRATION, INTERMITTENT, LESS THAN 6 HOURS PER DAY: ICD-10-PCS | Performed by: INTERNAL MEDICINE

## 2022-11-25 PROCEDURE — 83605 ASSAY OF LACTIC ACID: CPT

## 2022-11-25 PROCEDURE — 90935 HEMODIALYSIS ONE EVALUATION: CPT

## 2022-11-25 PROCEDURE — 6360000002 HC RX W HCPCS: Performed by: INTERNAL MEDICINE

## 2022-11-25 PROCEDURE — 85025 COMPLETE CBC W/AUTO DIFF WBC: CPT

## 2022-11-25 PROCEDURE — 87040 BLOOD CULTURE FOR BACTERIA: CPT

## 2022-11-25 PROCEDURE — 87636 SARSCOV2 & INF A&B AMP PRB: CPT

## 2022-11-25 PROCEDURE — 80053 COMPREHEN METABOLIC PANEL: CPT

## 2022-11-25 PROCEDURE — 93005 ELECTROCARDIOGRAM TRACING: CPT | Performed by: PHYSICIAN ASSISTANT

## 2022-11-25 PROCEDURE — 83880 ASSAY OF NATRIURETIC PEPTIDE: CPT

## 2022-11-25 PROCEDURE — 6360000002 HC RX W HCPCS: Performed by: EMERGENCY MEDICINE

## 2022-11-25 PROCEDURE — 2580000003 HC RX 258: Performed by: EMERGENCY MEDICINE

## 2022-11-25 PROCEDURE — 94761 N-INVAS EAR/PLS OXIMETRY MLT: CPT

## 2022-11-25 PROCEDURE — 93010 ELECTROCARDIOGRAM REPORT: CPT | Performed by: INTERNAL MEDICINE

## 2022-11-25 PROCEDURE — 83036 HEMOGLOBIN GLYCOSYLATED A1C: CPT

## 2022-11-25 PROCEDURE — 82803 BLOOD GASES ANY COMBINATION: CPT

## 2022-11-25 PROCEDURE — 99285 EMERGENCY DEPT VISIT HI MDM: CPT

## 2022-11-25 RX ORDER — SODIUM CHLORIDE 0.9 % (FLUSH) 0.9 %
5-40 SYRINGE (ML) INJECTION EVERY 12 HOURS SCHEDULED
Status: DISCONTINUED | OUTPATIENT
Start: 2022-11-25 | End: 2022-11-28 | Stop reason: HOSPADM

## 2022-11-25 RX ORDER — IPRATROPIUM BROMIDE AND ALBUTEROL SULFATE 2.5; .5 MG/3ML; MG/3ML
1 SOLUTION RESPIRATORY (INHALATION) ONCE
Status: COMPLETED | OUTPATIENT
Start: 2022-11-25 | End: 2022-11-25

## 2022-11-25 RX ORDER — OXYCODONE HYDROCHLORIDE AND ACETAMINOPHEN 5; 325 MG/1; MG/1
1 TABLET ORAL ONCE
Status: COMPLETED | OUTPATIENT
Start: 2022-11-25 | End: 2022-11-25

## 2022-11-25 RX ORDER — AZITHROMYCIN 250 MG/1
250 TABLET, FILM COATED ORAL DAILY
Status: DISCONTINUED | OUTPATIENT
Start: 2022-11-26 | End: 2022-11-28 | Stop reason: HOSPADM

## 2022-11-25 RX ORDER — ACETAMINOPHEN 325 MG/1
650 TABLET ORAL EVERY 6 HOURS PRN
Status: DISCONTINUED | OUTPATIENT
Start: 2022-11-25 | End: 2022-11-28 | Stop reason: HOSPADM

## 2022-11-25 RX ORDER — METOPROLOL SUCCINATE 50 MG/1
100 TABLET, EXTENDED RELEASE ORAL 2 TIMES DAILY
Status: DISCONTINUED | OUTPATIENT
Start: 2022-11-25 | End: 2022-11-28 | Stop reason: HOSPADM

## 2022-11-25 RX ORDER — POLYETHYLENE GLYCOL 3350 17 G/17G
17 POWDER, FOR SOLUTION ORAL DAILY PRN
Status: DISCONTINUED | OUTPATIENT
Start: 2022-11-25 | End: 2022-11-28 | Stop reason: HOSPADM

## 2022-11-25 RX ORDER — CLOPIDOGREL BISULFATE 75 MG/1
75 TABLET ORAL DAILY
Status: DISCONTINUED | OUTPATIENT
Start: 2022-11-25 | End: 2022-11-28 | Stop reason: HOSPADM

## 2022-11-25 RX ORDER — OXYCODONE HYDROCHLORIDE AND ACETAMINOPHEN 5; 325 MG/1; MG/1
1 TABLET ORAL ONCE
Status: DISCONTINUED | OUTPATIENT
Start: 2022-11-25 | End: 2022-11-25

## 2022-11-25 RX ORDER — IPRATROPIUM BROMIDE AND ALBUTEROL SULFATE 2.5; .5 MG/3ML; MG/3ML
1 SOLUTION RESPIRATORY (INHALATION)
Status: DISCONTINUED | OUTPATIENT
Start: 2022-11-25 | End: 2022-11-25

## 2022-11-25 RX ORDER — PRAVASTATIN SODIUM 40 MG
40 TABLET ORAL DAILY
Status: DISCONTINUED | OUTPATIENT
Start: 2022-11-25 | End: 2022-11-28 | Stop reason: HOSPADM

## 2022-11-25 RX ORDER — ISOSORBIDE DINITRATE 20 MG/1
20 TABLET ORAL 3 TIMES DAILY
Status: DISCONTINUED | OUTPATIENT
Start: 2022-11-25 | End: 2022-11-28 | Stop reason: HOSPADM

## 2022-11-25 RX ORDER — INSULIN LISPRO 100 [IU]/ML
0-4 INJECTION, SOLUTION INTRAVENOUS; SUBCUTANEOUS
Status: DISCONTINUED | OUTPATIENT
Start: 2022-11-25 | End: 2022-11-28 | Stop reason: HOSPADM

## 2022-11-25 RX ORDER — HYDROXYZINE HYDROCHLORIDE 10 MG/1
10 TABLET, FILM COATED ORAL 3 TIMES DAILY PRN
Status: DISCONTINUED | OUTPATIENT
Start: 2022-11-25 | End: 2022-11-28 | Stop reason: HOSPADM

## 2022-11-25 RX ORDER — CYCLOBENZAPRINE HCL 10 MG
5 TABLET ORAL 3 TIMES DAILY PRN
Status: DISCONTINUED | OUTPATIENT
Start: 2022-11-25 | End: 2022-11-28 | Stop reason: HOSPADM

## 2022-11-25 RX ORDER — ACETAMINOPHEN 650 MG/1
650 SUPPOSITORY RECTAL EVERY 6 HOURS PRN
Status: DISCONTINUED | OUTPATIENT
Start: 2022-11-25 | End: 2022-11-28 | Stop reason: HOSPADM

## 2022-11-25 RX ORDER — IPRATROPIUM BROMIDE AND ALBUTEROL SULFATE 2.5; .5 MG/3ML; MG/3ML
1 SOLUTION RESPIRATORY (INHALATION) EVERY 4 HOURS PRN
Status: DISCONTINUED | OUTPATIENT
Start: 2022-11-25 | End: 2022-11-28 | Stop reason: HOSPADM

## 2022-11-25 RX ORDER — SODIUM CHLORIDE 9 MG/ML
INJECTION, SOLUTION INTRAVENOUS PRN
Status: DISCONTINUED | OUTPATIENT
Start: 2022-11-25 | End: 2022-11-28 | Stop reason: HOSPADM

## 2022-11-25 RX ORDER — FUROSEMIDE 10 MG/ML
80 INJECTION INTRAMUSCULAR; INTRAVENOUS ONCE
Status: COMPLETED | OUTPATIENT
Start: 2022-11-25 | End: 2022-11-25

## 2022-11-25 RX ORDER — DULOXETIN HYDROCHLORIDE 30 MG/1
30 CAPSULE, DELAYED RELEASE ORAL DAILY
Status: DISCONTINUED | OUTPATIENT
Start: 2022-11-25 | End: 2022-11-28 | Stop reason: HOSPADM

## 2022-11-25 RX ORDER — DEXTROSE MONOHYDRATE 100 MG/ML
INJECTION, SOLUTION INTRAVENOUS CONTINUOUS PRN
Status: DISCONTINUED | OUTPATIENT
Start: 2022-11-25 | End: 2022-11-28 | Stop reason: HOSPADM

## 2022-11-25 RX ORDER — ONDANSETRON 2 MG/ML
4 INJECTION INTRAMUSCULAR; INTRAVENOUS EVERY 6 HOURS PRN
Status: DISCONTINUED | OUTPATIENT
Start: 2022-11-25 | End: 2022-11-28 | Stop reason: HOSPADM

## 2022-11-25 RX ORDER — QUETIAPINE FUMARATE 25 MG/1
50 TABLET, FILM COATED ORAL NIGHTLY
Status: DISCONTINUED | OUTPATIENT
Start: 2022-11-25 | End: 2022-11-28 | Stop reason: HOSPADM

## 2022-11-25 RX ORDER — INSULIN LISPRO 100 [IU]/ML
0-4 INJECTION, SOLUTION INTRAVENOUS; SUBCUTANEOUS NIGHTLY
Status: DISCONTINUED | OUTPATIENT
Start: 2022-11-25 | End: 2022-11-28 | Stop reason: HOSPADM

## 2022-11-25 RX ORDER — IPRATROPIUM BROMIDE AND ALBUTEROL SULFATE 2.5; .5 MG/3ML; MG/3ML
1 SOLUTION RESPIRATORY (INHALATION)
Status: COMPLETED | OUTPATIENT
Start: 2022-11-25 | End: 2022-11-25

## 2022-11-25 RX ORDER — SODIUM CHLORIDE 0.9 % (FLUSH) 0.9 %
5-40 SYRINGE (ML) INJECTION PRN
Status: DISCONTINUED | OUTPATIENT
Start: 2022-11-25 | End: 2022-11-28 | Stop reason: HOSPADM

## 2022-11-25 RX ORDER — PANTOPRAZOLE SODIUM 40 MG/1
40 TABLET, DELAYED RELEASE ORAL
Status: DISCONTINUED | OUTPATIENT
Start: 2022-11-26 | End: 2022-11-28 | Stop reason: HOSPADM

## 2022-11-25 RX ORDER — ONDANSETRON 2 MG/ML
4 INJECTION INTRAMUSCULAR; INTRAVENOUS ONCE
Status: COMPLETED | OUTPATIENT
Start: 2022-11-25 | End: 2022-11-25

## 2022-11-25 RX ORDER — NITROGLYCERIN 0.4 MG/1
0.4 TABLET SUBLINGUAL EVERY 5 MIN PRN
Status: DISCONTINUED | OUTPATIENT
Start: 2022-11-25 | End: 2022-11-28 | Stop reason: HOSPADM

## 2022-11-25 RX ORDER — ONDANSETRON 4 MG/1
4 TABLET, ORALLY DISINTEGRATING ORAL EVERY 8 HOURS PRN
Status: DISCONTINUED | OUTPATIENT
Start: 2022-11-25 | End: 2022-11-28 | Stop reason: HOSPADM

## 2022-11-25 RX ORDER — HEPARIN SODIUM 5000 [USP'U]/ML
5000 INJECTION, SOLUTION INTRAVENOUS; SUBCUTANEOUS EVERY 8 HOURS SCHEDULED
Status: DISCONTINUED | OUTPATIENT
Start: 2022-11-25 | End: 2022-11-25

## 2022-11-25 RX ORDER — ONDANSETRON 2 MG/ML
INJECTION INTRAMUSCULAR; INTRAVENOUS
Status: COMPLETED
Start: 2022-11-25 | End: 2022-11-25

## 2022-11-25 RX ADMIN — AZITHROMYCIN DIHYDRATE 500 MG: 500 INJECTION, POWDER, LYOPHILIZED, FOR SOLUTION INTRAVENOUS at 12:00

## 2022-11-25 RX ADMIN — ONDANSETRON 4 MG: 2 INJECTION INTRAMUSCULAR; INTRAVENOUS at 12:09

## 2022-11-25 RX ADMIN — INSULIN LISPRO 1 UNITS: 100 INJECTION, SOLUTION INTRAVENOUS; SUBCUTANEOUS at 17:53

## 2022-11-25 RX ADMIN — IPRATROPIUM BROMIDE AND ALBUTEROL SULFATE 1 AMPULE: 2.5; .5 SOLUTION RESPIRATORY (INHALATION) at 10:42

## 2022-11-25 RX ADMIN — ISOSORBIDE DINITRATE 20 MG: 20 TABLET ORAL at 22:34

## 2022-11-25 RX ADMIN — FUROSEMIDE 80 MG: 10 INJECTION, SOLUTION INTRAMUSCULAR; INTRAVENOUS at 17:46

## 2022-11-25 RX ADMIN — APIXABAN 2.5 MG: 2.5 TABLET, FILM COATED ORAL at 22:34

## 2022-11-25 RX ADMIN — SODIUM CHLORIDE, PRESERVATIVE FREE 10 ML: 5 INJECTION INTRAVENOUS at 22:35

## 2022-11-25 RX ADMIN — METOPROLOL SUCCINATE 100 MG: 50 TABLET, EXTENDED RELEASE ORAL at 22:34

## 2022-11-25 RX ADMIN — IPRATROPIUM BROMIDE AND ALBUTEROL SULFATE 3 ML: 2.5; .5 SOLUTION RESPIRATORY (INHALATION) at 15:39

## 2022-11-25 RX ADMIN — SODIUM ZIRCONIUM CYCLOSILICATE 10 G: 10 POWDER, FOR SUSPENSION ORAL at 22:35

## 2022-11-25 RX ADMIN — QUETIAPINE FUMARATE 50 MG: 25 TABLET ORAL at 22:34

## 2022-11-25 RX ADMIN — CEFTRIAXONE SODIUM 1000 MG: 1 INJECTION, POWDER, FOR SOLUTION INTRAMUSCULAR; INTRAVENOUS at 10:39

## 2022-11-25 RX ADMIN — OXYCODONE AND ACETAMINOPHEN 1 TABLET: 5; 325 TABLET ORAL at 10:29

## 2022-11-25 ASSESSMENT — ENCOUNTER SYMPTOMS
CHEST TIGHTNESS: 1
SHORTNESS OF BREATH: 1
DIARRHEA: 0
SINUS PAIN: 0
EYE REDNESS: 0
EYE DISCHARGE: 0
RHINORRHEA: 0
ABDOMINAL PAIN: 0
SORE THROAT: 0
COUGH: 0
NAUSEA: 0
CONSTIPATION: 0
VOMITING: 0
SINUS PRESSURE: 0

## 2022-11-25 ASSESSMENT — PAIN DESCRIPTION - LOCATION: LOCATION: BACK

## 2022-11-25 ASSESSMENT — PAIN - FUNCTIONAL ASSESSMENT: PAIN_FUNCTIONAL_ASSESSMENT: NONE - DENIES PAIN

## 2022-11-25 ASSESSMENT — PAIN SCALES - GENERAL
PAINLEVEL_OUTOF10: 0
PAINLEVEL_OUTOF10: 5
PAINLEVEL_OUTOF10: 10

## 2022-11-25 ASSESSMENT — PAIN DESCRIPTION - ORIENTATION: ORIENTATION: LOWER

## 2022-11-25 NOTE — RT PROTOCOL NOTE
RT Inhaler-Nebulizer Bronchodilator Protocol Note    There is a bronchodilator order in the chart from a provider indicating to follow the RT Bronchodilator Protocol and there is an Initiate RT Inhaler-Nebulizer Bronchodilator Protocol order as well (see protocol at bottom of note). CXR Findings:  XR CHEST PORTABLE    Result Date: 11/25/2022  Moderate left pleural effusion with left basilar airspace disease. This could represent atelectasis or pneumonia in the appropriate clinical setting. The findings from the last RT Protocol Assessment were as follows:   History Pulmonary Disease: Smoker 15 pack years or more  Respiratory Pattern: Dyspnea on exertion or RR 21-25 bpm  Breath Sounds: Inspiratory and expiratory or bilateral wheezing and/or rhonchi  Cough: Strong, spontaneous, non-productive  Indication for Bronchodilator Therapy: On home bronchodilators  Bronchodilator Assessment Score: 9    Aerosolized bronchodilator medication orders have been revised according to the RT Inhaler-Nebulizer Bronchodilator Protocol below. Respiratory Therapist to perform RT Therapy Protocol Assessment initially then follow the protocol. Repeat RT Therapy Protocol Assessment PRN for score 0-3 or on second treatment, BID, and PRN for scores above 3. No Indications - adjust the frequency to every 6 hours PRN wheezing or bronchospasm, if no treatments needed after 48 hours then discontinue using Per Protocol order mode. If indication present, adjust the RT bronchodilator orders based on the Bronchodilator Assessment Score as indicated below. Use Inhaler orders unless patient has one or more of the following: on home nebulizer, not able to hold breath for 10 seconds, is not alert and oriented, cannot activate and use MDI correctly, or respiratory rate 25 breaths per minute or more, then use the equivalent nebulizer order(s) with same Frequency and PRN reasons based on the score.   If a patient is on this medication at home then do not decrease Frequency below that used at home. 0-3 - enter or revise RT bronchodilator order(s) to equivalent RT Bronchodilator order with Frequency of every 4 hours PRN for wheezing or increased work of breathing using Per Protocol order mode. 4-6 - enter or revise RT Bronchodilator order(s) to two equivalent RT bronchodilator orders with one order with BID Frequency and one order with Frequency of every 4 hours PRN wheezing or increased work of breathing using Per Protocol order mode. 7-10 - enter or revise RT Bronchodilator order(s) to two equivalent RT bronchodilator orders with one order with TID Frequency and one order with Frequency of every 4 hours PRN wheezing or increased work of breathing using Per Protocol order mode. 11-13 - enter or revise RT Bronchodilator order(s) to one equivalent RT bronchodilator order with QID Frequency and an Albuterol order with Frequency of every 4 hours PRN wheezing or increased work of breathing using Per Protocol order mode. Greater than 13 - enter or revise RT Bronchodilator order(s) to one equivalent RT bronchodilator order with every 4 hours Frequency and an Albuterol order with Frequency of every 2 hours PRN wheezing or increased work of breathing using Per Protocol order mode. RT to enter RT Home Evaluation for COPD & MDI Assessment order using Per Protocol order mode.     Electronically signed by Abel Hammonds RCP on 11/25/2022 at 4:00 PM

## 2022-11-25 NOTE — FLOWSHEET NOTE
11/25/22 1549   Vital Signs   Heart Rate 92   Heart Rate Source Monitor   Resp 28   BP (!) 177/114   MAP (Calculated) 135   Patient Position Sitting   Oxygen Therapy   SpO2 99 %   O2 Device PAP (positive airway pressure)     Rn re-attempted to give pt blood pressure medication and lokelma, however pt struggled to breathe on 4l of oxygen desating to 92% (3-4L baseline home O2). Pt refused to take any medications by mouth and demanded to get back on BiPAP machine. Pt placed on BiPAP, still c/o sob oxygen sats are 100%. Lung sounds bl rhonchi and exp wheezes. Notified Dr. Stephanie Pratt.

## 2022-11-25 NOTE — CARE COORDINATION
CASE MANAGEMENT INITIAL ASSESSMENT      Reviewed chart and completed assessment with patient:yes  Family present: NA  Explained Case Management role/services. yes    Primary contact information:wife, ZINA MONROY Decision Maker :   Primary Decision Maker: Crystal Ann - Spouse - [phone]    Secondary Decision Maker: Maritza Dupont - Brother/Sister - [phone]          Can this person be reached and be able to respond quickly, such as within a few minutes or hours? Yes      Admit date/status:11/25/22  Graham Naomy   Is this a Readmission?:  No      Insurance:Aden dual   Precert required for SNF: Yes       3 night stay required: No    Living arrangements, Adls, care needs, prior to admission:home [place], reports he is independent in ADLs    Durable Medical Equipment at home:  Walker__Cane__RTS__ BSC__Shower Chair_x_  02_[employer]_ HHN__ CPAP_x_  BiPap__  Hospital Bed__ W/C___ Other_____    Services in the home and/or outpatient, prior to admission:none    Current PCP:Dr. Anabella Gaines                                Medications:yes Prescription coverage? Yes Will pt require financial assistance with medications No     Transportation needs: car vs. stretcher     [facility]  [facility]  Dialysis Schedule:M, W, F  Phone:  Fax:    PT/OT recs:    Hospital Exemption Notification (HEN):    Barriers to discharge:medical complications    Plan/comments:Referred to patient for d/c planning. Patient well known from previous admissions. Spoke to patient. Omero deshpande a 45year old male admitted for pneumonia. Patient has been to [facility] in past.  Patient currently at home and would like to return on d/c. Patient reports no home care and is interested in home care. Has had [facility] in past and referral made. Patient reports going to HD and having transport. Case management to follow for d/c needs.  Electronically signed by NINO Rutledge on 11/25/2022 at 11:59 AM     [facility] on chart for MD signature

## 2022-11-25 NOTE — ED PROVIDER NOTES
[facility]  EMERGENCY DEPARTMENT ENCOUNTER        Pt Name: Sukumar Hammer  MRN: [id number]  [place] 1970  Date of evaluation: 11/25/2022  Provider: Jose Noguera PA-C  PCP: Kailash Seo MD  ED Attending:JEANNIE Waterman      This patient was seen and evaluated by the attending physician No att. providers found. I have not independently evaluated this patient. CHIEF COMPLAINT       Chief Complaint   Patient presents with    Shortness of Breath     Pt to ED via EMS from home with c/o SOB for one day. Pt gets dialysis M,W,F, has not gone today yet. Denies any chest pain. Wears 3 L O2 at home at baseline, hx of COPD       HISTORY OF PRESENT ILLNESS   (Location/Symptom, Timing/Onset, Context/Setting, Quality, Duration, Modifying Factors, Severity)  Note limiting factors. Sukumar Hammer is a 45 y.o. male with past medical history of chronic respiratory failure typically on 3 L nasal cannula, CAD, CHF, PVD, HLD, HTN, history of tobacco abuse, obesity, DM2, end-stage renal disease (M,W,F) presents via EMS after a 2-day history of increasing worsening shortness of breath and coughing fevers and chills. Patient tried home albuterol nebulizer prior to coming into the ER. No improvement in his symptoms. Patient indicates he turned his baseline oxygen up to 5 or 6 L with no improvement in his symptoms. Patient indicates he had dialysis on Tuesday as they had to change it due to the holiday this week. Nursing Notes were all reviewed and agreed with or any disagreements were addressed  in the HPI. REVIEW OF SYSTEMS  (2-9 systems for level 4, 10 or more for level 5)     Review of Systems   Constitutional:  Negative for chills and fever. HENT: Negative. Negative for congestion, rhinorrhea, sinus pressure, sinus pain and sore throat. Eyes:  Negative for discharge, redness and visual disturbance. Respiratory:  Positive for chest tightness and shortness of breath. Negative for cough. Cardiovascular:  Positive for chest pain. Negative for palpitations. Gastrointestinal:  Negative for abdominal pain, constipation, diarrhea, nausea and vomiting. Genitourinary:  Negative for difficulty urinating, dysuria and frequency. Musculoskeletal: Negative. Skin: Negative. Neurological: Negative. Negative for dizziness, weakness, numbness and headaches. Psychiatric/Behavioral: Negative. All other systems reviewed and are negative. Positivesand Pertinent negatives as per HPI. Except as noted above in the ROS, all other systems were reviewed and negative.        PAST MEDICAL HISTORY     Past Medical History:   Diagnosis Date    Ambulatory dysfunction     walker for long distances, SOB with distance    Aortic stenosis     echo 2017    Arthritis     hands and hips    Asthma     Bilateral hilar adenopathy syndrome 6/3/2013    CAD (coronary artery disease)     Dr. Anthony Castillo [facility]) 04/19/2019    EF= 43%    CHF (congestive heart failure) ([address]     Chronic pain     COPD (chronic obstructive pulmonary disease) ([address]     pulmonology Dr. Yvonne Finnegan    Depression     Diabetes mellitus ([address]     borderline    Difficult intravenous access     Emphysema of lung ([address]     ESRD (end stage renal disease) on dialysis ([address]     MWF    Fear of needles     Gastric ulcer     GERD (gastroesophageal reflux disease)     Heart valve problem     bicuspic valve    Hemodialysis patient ([address]     History of spinal fracture     work incident    Hx of blood clots     Bilateral lower extremities; stents in place    Hyperlipidemia     Hypertension     MI (myocardial infarction) ([address] 2019    has had 9 MIs. 2019 was the last    Neuromuscular disorder ([address]     due to CVA    Numbness and tingling in left arm     from fistula    Pneumonia     PONV (postoperative nausea and vomiting)     Prolonged emergence from general anesthesia     States requires more medication than most people    Sleep apnea Uses CPAP    Stroke ([facility]     7mm thalamic cva 2017 deficts left side, left side weakness    TIA (transient ischemic attack)     Unspecified diseases of blood and blood-forming organs          SURGICAL HISTORY       Past Surgical History:   Procedure Laterality Date    AORTIC VALVE REPLACEMENT N/A 10/15/2019    TRANSCATHETER AORTIC VALVE REPLACEMENT FEMORAL APPROACH performed by Cortes Sánchez MD at [address] [facility] Right 7/2/2019    PERITONEAL DIALYSIS CATHETER REMOVAL performed by Radha Moses MD at [address]  2/29/2015    WN    CORONARY ANGIOPLASTY WITH STENT PLACEMENT  05/26/15    CYST REMOVAL  08/14/2013    EXCISION CYSTS, NECK X2 AND ABDOMINAL benign    DIAGNOSTIC CARDIAC CATH LAB PROCEDURE      DIALYSIS FISTULA CREATION Left 10/30/2017    LEFT BRACHIAL CEPHALIC FISTULA    DIALYSIS FISTULA CREATION Left 3/27/2019    LIGATION  AV FISTULA performed by Vonda Sandifer, MD at [id number] [place] [address], COLON, DIAGNOSTIC      IR TUNNELED CATHETER PLACEMENT GREATER THAN 5 YEARS  3/21/2022    IR TUNNELED CATHETER PLACEMENT GREATER THAN 5 YEARS 3/21/2022 [facility]    IR TUNNELED CATHETER PLACEMENT GREATER THAN 5 YEARS  4/21/2022    IR TUNNELED CATHETER PLACEMENT GREATER THAN 5 YEARS 4/21/2022 [facility]    IR TUNNELED CATHETER PLACEMENT GREATER THAN 5 YEARS  4/26/2022    IR TUNNELED CATHETER PLACEMENT GREATER THAN 5 YEARS 4/26/2022 [facility]    IR TUNNELED CATHETER PLACEMENT GREATER THAN 5 YEARS  6/23/2022    IR TUNNELED CATHETER PLACEMENT GREATER THAN 5 YEARS 6/23/2022 [facility]    OTHER SURGICAL HISTORY  02/01/2017    laparoscopic cholecystectomy with intraoperative cholangiogram    OTHER SURGICAL HISTORY  2018    PORT PLACEMENT  - vas cath    OTHER SURGICAL HISTORY Bilateral 06/26/2018    laprascopic peritoneal dialysis catheter placement    OTHER SURGICAL HISTORY Right 09/2018    peritoneal dialysis port placed on right side of abdomen    OTHER SURGICAL HISTORY  05/28/2019    PTA/Stenting R External Iliac artery    [place] LAP INSERTION TUNNELED INTRAPERITONEAL CATHETER N/A 9/21/2018    LAPAROSCOPIC PERITONEAL DIALYSIS CATHETER REPLACEMENT performed by Kaden Riddle MD at [address] 2/24/2022    PERINEAL ABCESS DRAINAGE performed by Kaden Riddle MD at [address] [place] [address] N/A 10/2/2022    THORACENTESIS ULTRASOUND performed by Greg Obrien MD at [address]  01/06/2016    UPPER GASTROINTESTINAL ENDOSCOPY  01/29/2017    possible candida, otherwise normal appearing    VASCULAR SURGERY  aprx 2 years ago    2 stents placed, each side of groin         CURRENT MEDICATIONS       Current Discharge Medication List        CONTINUE these medications which have NOT CHANGED    Details   apixaban (ELIQUIS) 2.5 MG TABS tablet Take 1 tablet by mouth 2 times daily  Qty: 60 tablet, Refills: 0      metoprolol succinate (TOPROL XL) 100 MG extended release tablet Take 1 tablet by mouth in the morning and at bedtime  Qty: 30 tablet, Refills: 3      cyclobenzaprine (FLEXERIL) 5 MG tablet Muscle spasms    Associated Diagnoses: Chronic pain syndrome      QUEtiapine (SEROQUEL) 50 MG tablet TAKE 1 TABLET BY MOUTH EVERY EVENING  Qty: 30 tablet, Refills: 10    Associated Diagnoses: Insomnia, unspecified type; Mood disorder (HCC)      isosorbide dinitrate (ISORDIL) 20 MG tablet Take 1 tablet by mouth 3 times daily  Qty: 90 tablet, Refills: 3      clopidogrel (PLAVIX) 75 MG tablet Take 1 tablet by mouth daily  Qty: 90 tablet, Refills: 3      pantoprazole (PROTONIX) 40 MG tablet TAKE 1 TABLET BY MOUTH EVERY MORNING BEFORE BREAKFAST  Qty: 30 tablet, Refills: 10    Associated Diagnoses: Gastroesophageal reflux disease without esophagitis      docusate sodium (DOK) 100 MG capsule Take 1 capsule by mouth daily  Qty: 30 capsule, Refills: 5 DULoxetine (CYMBALTA) 30 MG extended release capsule TAKE 1 CAPSULE BY MOUTH EVERY DAY  Qty: 30 capsule, Refills: 10    Associated Diagnoses: Depression, unspecified depression type      sennosides-docusate sodium (SENOKOT-S) 8.6-50 MG tablet Take 1 tablet by mouth daily  Qty: 10 tablet, Refills: 0    Associated Diagnoses: Constipation, unspecified constipation type      pravastatin (PRAVACHOL) 40 MG tablet Take 1 tablet by mouth daily  Qty: 90 tablet, Refills: 3      B Complex-C-Folic Acid (VIRT-CAPS) 1 MG CAPS TAKE 1 CAPSULE BY MOUTH EVERY DAY  Qty: 90 capsule, Refills: 1      Calcium Acetate, Phos Binder, 667 MG CAPS TAKE 1 CAPSULE BY MOUTH THREE TIMES DAILY WITH MEALS  Qty: 90 capsule, Refills: 3      nitroGLYCERIN (NITROSTAT) 0.4 MG SL tablet DISSOLVE 1 TABLET UNDER THE TONGUE AS NEEDED FOR CHEST PAIN EVERY 5 MINUTES UP TO 3 TIMES. IF NO RELIEF CALL 911. Qty: 25 tablet, Refills: 10    Associated Diagnoses: Coronary artery disease involving native heart without angina pectoris, unspecified vessel or lesion type      vitamin D (ERGOCALCIFEROL) 38202 units CAPS capsule TK 1 C PO WEEKLY  Refills: 11      Alcohol Swabs PADS USE AS DIRECTED  Qty: 300 each, Refills: 3      ipratropium-albuterol (DUONEB) 0.5-2.5 (3) MG/3ML SOLN nebulizer solution Inhale 3 mLs into the lungs every 6 hours as needed for Shortness of Breath  Qty: 360 mL, Refills: 1      calcium carbonate (TUMS) 500 MG chewable tablet Take 1 tablet by mouth 3 times daily as needed for Heartburn.      gabapentin (NEURONTIN) 100 MG capsule Take 2 capsules by mouth 3 times daily as needed (neuropathic pain) for up to 10 days.   Qty: 60 capsule, Refills: 0    Associated Diagnoses: Dialysis patient, noncompliant ([address]               ALLERGIES     Morphine    FAMILY HISTORY       Family History   Problem Relation Age of Onset    Diabetes Mother     Heart Disease Father     Kidney Disease Sister         stage 4-kidney failure    Cancer Sister     Heart Disease Sister     Obesity Sister     Cancer Sister     Heart Disease Sister     Obesity Sister     Alcohol Abuse Brother          SOCIAL HISTORY       Social History     Socioeconomic History    Marital status:    Tobacco Use    Smoking status: Former     Packs/day: 0.50     Years: 33.00     Pack years: 16.50     Types: Cigarettes     Quit date: 2020     Years since quittin.5    Smokeless tobacco: Never    Tobacco comments:     States quit 2021   Vaping Use    Vaping Use: Never used   Substance and Sexual Activity    Alcohol use: Not Currently     Alcohol/week: 0.0 standard drinks     Comment: occ    Drug use: No    Sexual activity: Yes     Partners: Female     Comment:        SCREENINGS     NIH Score       Glascow [place] Coma Scale  Eye Opening: Spontaneous  Best Verbal Response: Oriented  Best Motor Response: Obeys commands  [place] Coma Scale Score: 15    [facility]     Heart Score         PHYSICAL EXAM    (up to 7 for level 4, 8 ormore for level 5)     ED Triage Vitals [22 0921]   BP Temp Temp Source Heart Rate Resp SpO2 Height Weight   (!) 158/88 98.1 °F (36.7 °C) Oral 90 26 100 % 5' 9\" (1.753 m) 235 lb (106.6 kg)       Physical Exam  Vitals and nursing note reviewed. Constitutional:       Appearance: Normal appearance. He is well-developed. He is obese. He is ill-appearing. He is not diaphoretic. HENT:      Head: Normocephalic and atraumatic. Nose: Nose normal.      Mouth/Throat:      Mouth: Mucous membranes are moist.      Pharynx: No oropharyngeal exudate. Eyes:      General:         Right eye: No discharge. Left eye: No discharge. Extraocular Movements: Extraocular movements intact. Conjunctiva/sclera: Conjunctivae normal.      Pupils: Pupils are equal, round, and reactive to light. Cardiovascular:      Rate and Rhythm: Normal rate and regular rhythm. Heart sounds: Normal heart sounds. No murmur heard. No friction rub. No gallop. Pulmonary:      Effort: Tachypnea, accessory muscle usage and respiratory distress present. Breath sounds: Decreased air movement present. Decreased breath sounds present. No wheezing, rhonchi or rales. Abdominal:      General: Bowel sounds are normal. There is no distension. Palpations: Abdomen is soft. Tenderness: There is no abdominal tenderness. Musculoskeletal:         General: Normal range of motion. Cervical back: Normal range of motion. Skin:     General: Skin is warm and dry. Capillary Refill: Capillary refill takes less than 2 seconds. Neurological:      General: No focal deficit present. Mental Status: He is alert.    Psychiatric:         Mood and Affect: Mood normal.         Behavior: Behavior normal.       DIAGNOSTIC RESULTS   LABS:    Labs Reviewed   CBC WITH AUTO DIFFERENTIAL - Abnormal; Notable for the following components:       Result Value    RBC 3.21 (*)     Hemoglobin 9.4 (*)     Hematocrit 28.8 (*)     RDW 16.6 (*)     Neutrophils Absolute 9.6 (*)     Lymphocytes Absolute 0.6 (*)     All other components within normal limits   COMPREHENSIVE METABOLIC PANEL W/ REFLEX TO MG FOR LOW K - Abnormal; Notable for the following components:    Sodium 134 (*)     Potassium reflex Magnesium 5.8 (*)     Chloride 93 (*)     Anion Gap 17 (*)     Glucose 203 (*)     BUN 71 (*)     Creatinine 8.5 (*)     Est, Glom Filt Rate 7 (*)     Calcium 8.1 (*)     ALT 9 (*)     AST 8 (*)     All other components within normal limits    Narrative:     CALL  [facility] tel. [phone],  Chemistry results called to and read back by Any Koch RN, 11/25/2022  11:35, by Anjel 172 - Abnormal; Notable for the following components:    Pro-BNP >70,000 (*)     All other components within normal limits   TROPONIN - Abnormal; Notable for the following components:    Troponin 0.18 (*)     All other components within normal limits   PROCALCITONIN - Abnormal; Notable for the following components:    Procalcitonin 0.47 (*)     All other components within normal limits   BLOOD GAS, ARTERIAL - Abnormal; Notable for the following components:    pH, Arterial 7.308 (*)     pCO2, Arterial 55.0 (*)     pO2, Arterial 25.6 (*)     Hemoglobin, Art, Extended 9.3 (*)     O2 Sat, Arterial 35.4 (*)     All other components within normal limits    Narrative:     CALL  [facility] tel. [phone],  Chemistry results called to and read back by Chalino Lynn RN ED, 11/25/2022  11:44, by DEAN   TROPONIN - Abnormal; Notable for the following components:    Troponin 0.19 (*)     All other components within normal limits   POCT GLUCOSE - Abnormal; Notable for the following components:    POC Glucose 221 (*)     All other components within normal limits   POCT GLUCOSE - Abnormal; Notable for the following components:    POC Glucose 221 (*)     All other components within normal limits   COVID-19 & INFLUENZA COMBO   CULTURE, BLOOD 1   CULTURE, BLOOD 2   LACTIC ACID   SAMPLE POSSIBLE BLOOD BANK TESTING   MAGNESIUM   HEMOGLOBIN A1C   POCT GLUCOSE       All other labs were within normal range or notreturned as of this dictation. EKG: All EKG's are interpreted by the Emergency Department Physician who either signs or Co-signs this chart in the absence of a cardiologist.  Please see their note for interpretation of EKG. RADIOLOGY:         Interpretation per the Radiologist below, if available at the time of this note:    XR CHEST PORTABLE   Final Result   Moderate left pleural effusion with left basilar airspace disease. This   could represent atelectasis or pneumonia in the appropriate clinical setting. CRITICAL CARE TIME   Total critical care time today provided was 35 minutes. This excludes seperately billable procedures and family discussion time. Provided for acute hypoxic respiratory failure acute pneumonia, requiring intervention with concern for potential decompensation. numerous re-evaluations, coordination with numerous services. Is this patient to be included in the SEP-1 Core Measure due to severe sepsis or septic shock? No   Exclusion criteria - the patient is NOT to be included for SEP-1 Core Measure due to:  2+ SIRS criteria are not met     CONSULTS:  Hospitalist, accepted by Dr Victor Manuel Garcia.        EMERGENCY DEPARTMENT COURSE and DIFFERENTIAL DIAGNOSIS/MDM:   Vitals:    Vitals:    11/25/22 1411 11/25/22 1548 11/25/22 1549 11/25/22 1552   BP: (!) 167/94  (!) 177/101    Pulse:  93     Resp: 24 27   Temp: 97.7 °F (36.5 °C)      TempSrc: Oral      SpO2: 96% (!) 88%     Weight:       Height:           Patient was given the following medications:  Medications   apixaban (ELIQUIS) tablet 2.5 mg (2.5 mg Oral Not Given 11/25/22 1547)   clopidogrel (PLAVIX) tablet 75 mg (75 mg Oral Given 11/25/22 1655)   cyclobenzaprine (FLEXERIL) tablet 5 mg (5 mg Oral Given 11/25/22 1654)   DULoxetine (CYMBALTA) extended release capsule 30 mg (30 mg Oral Not Given 11/25/22 1548)   ipratropium-albuterol (DUONEB) nebulizer solution 3 mL (3 mLs Inhalation Given 11/25/22 1539)   isosorbide dinitrate (ISORDIL) tablet 20 mg (20 mg Oral Given 11/25/22 1549)   metoprolol succinate (TOPROL XL) extended release tablet 100 mg (100 mg Oral Given 11/25/22 1549)   nitroGLYCERIN (NITROSTAT) SL tablet 0.4 mg (has no administration in time range)   pantoprazole (PROTONIX) tablet 40 mg (has no administration in time range)   pravastatin (PRAVACHOL) tablet 40 mg (40 mg Oral Given 11/25/22 1549)   QUEtiapine (SEROQUEL) tablet 50 mg (has no administration in time range)   sodium chloride flush 0.9 % injection 5-40 mL (has no administration in time range)   sodium chloride flush 0.9 % injection 5-40 mL (has no administration in time range)   0.9 % sodium chloride infusion (has no administration in time range)   ondansetron (ZOFRAN-ODT) disintegrating tablet 4 mg (has no administration in time range)     Or ondansetron (ZOFRAN) injection 4 mg (has no administration in time range)   polyethylene glycol (GLYCOLAX) packet 17 g (has no administration in time range)   acetaminophen (TYLENOL) tablet 650 mg (has no administration in time range)     Or   acetaminophen (TYLENOL) suppository 650 mg (has no administration in time range)   cefTRIAXone (ROCEPHIN) 1,000 mg in dextrose 5 % 50 mL IVPB mini-bag (has no administration in time range)   azithromycin (ZITHROMAX) tablet 250 mg (has no administration in time range)   glucose chewable tablet 16 g (has no administration in time range)   dextrose bolus 10% 125 mL (has no administration in time range)     Or   dextrose bolus 10% 250 mL (has no administration in time range)   glucagon (rDNA) injection 1 mg (has no administration in time range)   dextrose 10 % infusion (has no administration in time range)   insulin lispro (HUMALOG) injection vial 0-4 Units (has no administration in time range)   insulin lispro (HUMALOG) injection vial 0-4 Units (has no administration in time range)   sodium zirconium cyclosilicate (LOKELMA) oral suspension 10 g (has no administration in time range)   ipratropium-albuterol (DUONEB) nebulizer solution 1 ampule (1 ampule Inhalation Given 11/25/22 1042)   ipratropium-albuterol (DUONEB) nebulizer solution 1 ampule (1 ampule Inhalation Given 11/25/22 1042)   oxyCODONE-acetaminophen (PERCOCET) 5-325 MG per tablet 1 tablet (1 tablet Oral Given 11/25/22 1029)   cefTRIAXone (ROCEPHIN) 1,000 mg in dextrose 5 % 50 mL IVPB mini-bag (0 mg IntraVENous Stopped 11/25/22 1129)   azithromycin (ZITHROMAX) 500 mg in D5W 250ml addavial (0 mg IntraVENous Stopped 11/25/22 1339)   ondansetron (ZOFRAN) injection 4 mg (4 mg IntraVENous Given 11/25/22 1209)         Afebrile, stable, patient presents to the ED for evaluation. Seen in conjunction with attending ED provider who agrees with assessment and plan. Patient arrives on CPAP acute hypoxia, worsened from baseline. Provided with breathing tx's.paged respiratory to xochitl to bedside, started on CPAP in ED. Ordered abx. EKG is normal sinus rhythm, nonspecific T wave abnormalities. Chest x-ray reveals a moderate pleural effusion with a left-sided basilar airspace disease. all questions are answered. Elevated troponin, slightly worse from baseline, will repeat, concern for trending up. Ph is 7.3, Po2 of 25.6, PCO2 of 55. Negative COVID-negative influenza no leukocytosis on his CBC hemoglobin of 9.4 hematocrit of 28.8 potassium of 5.1 concern for being hemolyzed we will reorder. Patient's bun is 71 creatinine 8.4 GFR 7 this is consistent with his end-stage renal disease. BNP elevated again consistent with Renal disease. Procalcitonin of 0.47 however we have initiated antibiotics. The patient tolerated their visit well. They were seen and evaluated by the attendingphysician, who agreed with the assessment and plan. The patient and / or the family were informed of the results of any tests, a time was given to answer questions, a plan was proposed and they agreed Angel Squires. FINAL IMPRESSION      1. Acute on chronic respiratory failure, unspecified whether with hypoxia or hypercapnia ([address]    2. Pleural effusion    3. Pneumonia of left lung due to infectious organism, unspecified part of lung    4. Stage 5 chronic kidney disease on chronic dialysis [facility])          DISPOSITION/PLAN   DISPOSITION Admitted 11/25/2022 12:58:02 PM      PATIENT REFERRED TO:  [facility]. [address] [place] [id number] [phone]        DISCHARGE MEDICATIONS:  Current Discharge Medication List          DISCONTINUED MEDICATIONS:  Current Discharge Medication List                 Pt was seen during the COVID 19 pandemic. Appropriate PPE worn by ME during patient encounters.  Pt seen during a time with constrained hospital bed capacity and other potential inpatient and outpatient resources were constrained due to the viral pandemic.    Please note that portions of this note were completed with a voice recognition program.  Efforts were made to edit the dictations but occasionally words are mis-transcribed.)    Zuri Humphrey PA-C (electronically signed)        Zuri Humphrey PA-C  11/25/22 31 Halima Castañeda PA-C  11/25/22 1675

## 2022-11-25 NOTE — PROGRESS NOTES
11/25/22 0927   NIV Type   $NIV $Daily Charge   Skin Protection for O2 Device Yes   Location Nose   NIV Started/Stopped (S)  On   Equipment Type v60   Mode Bilevel   Mask Type Full face mask   Mask Size Large   Settings/Measurements   PIP Observed 16 cm H20   IPAP 12 cmH20   CPAP/EPAP 6 cmH2O   Vt (Measured) 586 mL   Rate Ordered 14   Resp 23   FiO2  40 %   I Time/ I Time % 1 s   Minute Volume (L/min) 18 Liters   Mask Leak (lpm) 15 lpm   Comfort Level Good   Using Accessory Muscles Yes   SpO2 99   Patient's Home Machine No   Alarm Settings   Alarms On Y   Low Pressure (cmH2O) 6 cmH2O   High Pressure (cmH2O) 30 cmH2O   Apnea (secs) 20 secs   RR Low (bpm) 6   RR High (bpm) 45 br/min

## 2022-11-25 NOTE — PROGRESS NOTES
Received perfect serve from ER regarding possible admission. Forwarded to Dr. Rush Favorite, admitting provider.

## 2022-11-25 NOTE — CONSULTS
Inpatient consult has been placed to nephrology. Spoke with Dr. Jose Groves @ 2213 via perfect serve.     Cely Malloy RN  11/25/2022

## 2022-11-25 NOTE — PROGRESS NOTES
11/25/22 1552   NIV Type   $NIV $Daily Charge   Skin Protection for O2 Device Yes   Location Nose   NIV Started/Stopped On   Equipment Type v60   Mode CPAP   Mask Type Full face mask   Mask Size Medium   Settings/Measurements   PIP Observed 7 cm H20   CPAP/EPAP 5 cmH2O   Vt (Measured) 389 mL   Resp 27   FiO2  40 %   Minute Volume (L/min) 17 Liters   Mask Leak (lpm) 43 lpm   Comfort Level Good   SpO2 99   Patient's Home Machine No   Patient Observation   Observations mepilex on nose   Alarm Settings   Alarms On Y   Low Pressure (cmH2O) 6 cmH2O   High Pressure (cmH2O) 30 cmH2O   Delay Alarm 20 sec(s)   Apnea (secs) 20 secs   RR Low (bpm) 6   RR High (bpm) 40 br/min

## 2022-11-25 NOTE — ED PROVIDER NOTES
I independently performed a history and physical on Dafne Bravo. All diagnostic, treatment, and disposition decisions were made by myself in conjunction with the advanced practice provider. I personally saw the patient and performed a substantive portion of the visit including all aspects of the medical decision making. For further details of [address] emergency department encounter, please see JASE Stein's documentation. Patient is a 55-year-old male who has had worsening shortness of breath over the last 3 days to the point where he was significantly struggling to breathe with whenever EMS arrived. He was placed on CPAP prior to arrival and this greatly helped with his symptoms. He is normally on dialysis and did go on Tuesday since his schedule was altered for the Thanksgiving holiday and he was supposed to go this morning for dialysis again but was so short of breath he did not think he would make it through it. He does have a history of COPD as well and thinks this is related to his COPD. He does take Eliquis. He denies any abdominal pain or vomiting. He did have a fever yesterday. He reports having a wound to his left toe but denies any new changes with this. Due to concern for worsening shortness of breath and significant trouble breathing, he came to the emergency department for further evaluation. Physical:   Gen: Mild acute distress. AOx3.   On CPAP  Psych: Anxious mood and affect  HEENT: NCAT, dry MM  Neck: supple  Cardiac: RRR, pulses 2+ in upper extremities, 1+ pulses to DPs, cap refill less than 2 seconds to both feet   Lungs: Mild respiratory distress, bilateral breath sounds present but diminished in all lung fields with significant wheezing throughout  Abdomen: soft and nontender with no R/D/G  Neuro: Moving all extremities equally, GCS equals 15    The Ekg interpreted by me shows  normal sinus rhythm with a rate of 87  Axis is   Normal  QTc is   prolonged QT  Intervals and Durations are unremarkable. ST Segments: no acute change and nonspecific changes  No significant change from prior EKG dated - 10/18/22  No STEMI         Critical Care Time:    I personally saw the patient and independently provided 20 minutes of non-concurrent critical care out of the total shared critical care time provided. Includes repeat examinations, lab interpretation, charting, treating for acute on chronic respiratory failure requiring positive pressure ventilation  Excludes separate billable procedures. Patient at risk for serious decompensation if not treated for this life-threatening condition. I was the primary provider for the patient along with JASE Gannon. MDM : Patient was evaluated due to worsening shortness of breath over the last couple of days but significantly worsening this morning and being unable to go to dialysis. X-ray showed concern for possible pneumonia and he did report having a fever yesterday and therefore he was started on antibiotics. Due to concern for increased oxygen requirement along with initial respiratory distress prior to arrival to the ED, he will need further evaluation in the hospital and agrees with this plan. He did have a wound to the left toe that would benefit from wound care as well. He was in no acute distress at time of admission and agreed with this plan. Initial troponin was slightly more elevated than normal but this is most likely related to his dialysis schedule and should be trended in the hospital.  I do not suspect acute coronary syndrome at this time since he has no chest pain and feel this is more likely related to fluid overload.      Jennyfer Richard MD  11/26/22 0730

## 2022-11-26 LAB
ALBUMIN SERPL-MCNC: 3.7 G/DL (ref 3.4–5)
ANION GAP SERPL CALCULATED.3IONS-SCNC: 15 MMOL/L (ref 3–16)
BUN BLDV-MCNC: 51 MG/DL (ref 7–20)
CALCIUM SERPL-MCNC: 8.2 MG/DL (ref 8.3–10.6)
CHLORIDE BLD-SCNC: 93 MMOL/L (ref 99–110)
CO2: 26 MMOL/L (ref 21–32)
CREAT SERPL-MCNC: 6.3 MG/DL (ref 0.9–1.3)
ESTIMATED AVERAGE GLUCOSE: 159.9 MG/DL
GFR SERPL CREATININE-BSD FRML MDRD: 10 ML/MIN/{1.73_M2}
GLUCOSE BLD-MCNC: 123 MG/DL (ref 70–99)
GLUCOSE BLD-MCNC: 126 MG/DL (ref 70–99)
GLUCOSE BLD-MCNC: 134 MG/DL (ref 70–99)
GLUCOSE BLD-MCNC: 139 MG/DL (ref 70–99)
HBA1C MFR BLD: 7.2 %
HCT VFR BLD CALC: 28.4 % (ref 40.5–52.5)
HEMOGLOBIN: 9.2 G/DL (ref 13.5–17.5)
MAGNESIUM: 1.9 MG/DL (ref 1.8–2.4)
MCH RBC QN AUTO: 29.3 PG (ref 26–34)
MCHC RBC AUTO-ENTMCNC: 32.4 G/DL (ref 31–36)
MCV RBC AUTO: 90.4 FL (ref 80–100)
PDW BLD-RTO: 16.6 % (ref 12.4–15.4)
PERFORMED ON: ABNORMAL
PHOSPHORUS: 4.4 MG/DL (ref 2.5–4.9)
PLATELET # BLD: 362 K/UL (ref 135–450)
PMV BLD AUTO: 7.9 FL (ref 5–10.5)
POTASSIUM SERPL-SCNC: 4.9 MMOL/L (ref 3.5–5.1)
RBC # BLD: 3.14 M/UL (ref 4.2–5.9)
SODIUM BLD-SCNC: 134 MMOL/L (ref 136–145)
WBC # BLD: 9.8 K/UL (ref 4–11)

## 2022-11-26 PROCEDURE — 6360000002 HC RX W HCPCS: Performed by: INTERNAL MEDICINE

## 2022-11-26 PROCEDURE — 83735 ASSAY OF MAGNESIUM: CPT

## 2022-11-26 PROCEDURE — 80069 RENAL FUNCTION PANEL: CPT

## 2022-11-26 PROCEDURE — 90935 HEMODIALYSIS ONE EVALUATION: CPT

## 2022-11-26 PROCEDURE — 6360000002 HC RX W HCPCS

## 2022-11-26 PROCEDURE — 85027 COMPLETE CBC AUTOMATED: CPT

## 2022-11-26 PROCEDURE — 6370000000 HC RX 637 (ALT 250 FOR IP): Performed by: INTERNAL MEDICINE

## 2022-11-26 PROCEDURE — 2580000003 HC RX 258: Performed by: INTERNAL MEDICINE

## 2022-11-26 PROCEDURE — 36415 COLL VENOUS BLD VENIPUNCTURE: CPT

## 2022-11-26 PROCEDURE — 2060000000 HC ICU INTERMEDIATE R&B

## 2022-11-26 PROCEDURE — 6370000000 HC RX 637 (ALT 250 FOR IP): Performed by: NURSE PRACTITIONER

## 2022-11-26 PROCEDURE — 94660 CPAP INITIATION&MGMT: CPT

## 2022-11-26 RX ORDER — HEPARIN SODIUM 1000 [USP'U]/ML
3600 INJECTION, SOLUTION INTRAVENOUS; SUBCUTANEOUS PRN
Status: DISCONTINUED | OUTPATIENT
Start: 2022-11-26 | End: 2022-11-28 | Stop reason: HOSPADM

## 2022-11-26 RX ORDER — HEPARIN SODIUM 1000 [USP'U]/ML
INJECTION, SOLUTION INTRAVENOUS; SUBCUTANEOUS
Status: COMPLETED
Start: 2022-11-26 | End: 2022-11-26

## 2022-11-26 RX ORDER — OXYCODONE AND ACETAMINOPHEN 7.5; 325 MG/1; MG/1
1 TABLET ORAL EVERY 6 HOURS PRN
Status: COMPLETED | OUTPATIENT
Start: 2022-11-26 | End: 2022-11-28

## 2022-11-26 RX ADMIN — PANTOPRAZOLE SODIUM 40 MG: 40 TABLET, DELAYED RELEASE ORAL at 07:00

## 2022-11-26 RX ADMIN — PRAVASTATIN SODIUM 40 MG: 40 TABLET ORAL at 12:54

## 2022-11-26 RX ADMIN — HEPARIN SODIUM 3600 UNITS: 1000 INJECTION INTRAVENOUS; SUBCUTANEOUS at 12:00

## 2022-11-26 RX ADMIN — DULOXETINE HYDROCHLORIDE 30 MG: 30 CAPSULE, DELAYED RELEASE ORAL at 12:52

## 2022-11-26 RX ADMIN — AZITHROMYCIN MONOHYDRATE 250 MG: 250 TABLET ORAL at 13:03

## 2022-11-26 RX ADMIN — SODIUM CHLORIDE, PRESERVATIVE FREE 10 ML: 5 INJECTION INTRAVENOUS at 12:54

## 2022-11-26 RX ADMIN — METOPROLOL SUCCINATE 100 MG: 50 TABLET, EXTENDED RELEASE ORAL at 12:52

## 2022-11-26 RX ADMIN — METOPROLOL SUCCINATE 100 MG: 50 TABLET, EXTENDED RELEASE ORAL at 19:54

## 2022-11-26 RX ADMIN — CLOPIDOGREL BISULFATE 75 MG: 75 TABLET ORAL at 12:52

## 2022-11-26 RX ADMIN — SODIUM CHLORIDE, PRESERVATIVE FREE 10 ML: 5 INJECTION INTRAVENOUS at 20:18

## 2022-11-26 RX ADMIN — APIXABAN 2.5 MG: 2.5 TABLET, FILM COATED ORAL at 12:52

## 2022-11-26 RX ADMIN — QUETIAPINE FUMARATE 50 MG: 25 TABLET ORAL at 19:54

## 2022-11-26 RX ADMIN — OXYCODONE AND ACETAMINOPHEN 1 TABLET: 7.5; 325 TABLET ORAL at 20:18

## 2022-11-26 RX ADMIN — HEPARIN SODIUM 3600 UNITS: 1000 INJECTION, SOLUTION INTRAVENOUS; SUBCUTANEOUS at 12:00

## 2022-11-26 RX ADMIN — EPOETIN ALFA-EPBX 3000 UNITS: 3000 INJECTION, SOLUTION INTRAVENOUS; SUBCUTANEOUS at 11:50

## 2022-11-26 RX ADMIN — CYCLOBENZAPRINE 5 MG: 10 TABLET, FILM COATED ORAL at 20:18

## 2022-11-26 RX ADMIN — CEFTRIAXONE SODIUM 1000 MG: 1 INJECTION, POWDER, FOR SOLUTION INTRAMUSCULAR; INTRAVENOUS at 13:01

## 2022-11-26 RX ADMIN — APIXABAN 2.5 MG: 2.5 TABLET, FILM COATED ORAL at 19:54

## 2022-11-26 RX ADMIN — ISOSORBIDE DINITRATE 20 MG: 20 TABLET ORAL at 12:52

## 2022-11-26 RX ADMIN — ISOSORBIDE DINITRATE 20 MG: 20 TABLET ORAL at 19:54

## 2022-11-26 ASSESSMENT — PAIN SCALES - GENERAL: PAINLEVEL_OUTOF10: 9

## 2022-11-26 ASSESSMENT — PAIN DESCRIPTION - LOCATION: LOCATION: BACK

## 2022-11-26 ASSESSMENT — PAIN DESCRIPTION - ORIENTATION: ORIENTATION: LOWER

## 2022-11-26 NOTE — FLOWSHEET NOTE
Treatment time: 2.5 hours  Net UF: 2000 ml    Pre weight: 114 kg (Questionable Bed weight,  102.5 kg yesterday)  Post weight: 112 kg  EDW: 102 kg    Access used: Left chest wall TDC  Access function: Good with  ml/min    Medications or blood products given: Retacrit    Regular outpatient schedule: KEVIN ( Extra HD today)    Summary of response to treatment: Pt tolerated well with HD, vital signs stable, HD completed in full, heparin dwell in TDC, capped and clamped. Cirt Line: malfunction     Copy of dialysis treatment record placed in chart, to be scanned into EMR.    11/26/22 0930 11/26/22 1205   Vital Signs   BP (!) 143/62 120/69   Temp 97.6 °F (36.4 °C) 98 °F (36.7 °C)   Heart Rate 59 79   Resp 18 16   Weight 251 lb 5.2 oz (114 kg) 246 lb 14.6 oz (112 kg)   Percent Weight Change 0 -1.75   Treatment   Time On 0929  --    Treatment Goal 2L  --    Observations & Evaluations   Level of Consciousness 0  --    Oriented X 3  --    Heart Rhythm Regular  --    Respiratory Quality/Effort Unlabored  --    O2 Device Nasal cannula  --    Technical Checks   Dialysis Machine No. [id number]  --    RO Machine Number [id number]  --    Dialyzer Lot No. [id number]  --    Tubing Lot Number [id number]  --    All Connections Secure Yes  --    NS Bag Yes  --    Saline Line Double Clamped Yes  --    Dialyzer F-160  --    Prime Volume (mL) 200 mL  --    RO Machine Log Sheet Completed Yes  --    Machine Alarm Self Test Completed; Passed  --    Air Foam Detector Tested;Proper Function;pH Reading  --    Extracorporeal Circuit Tested for Integrity Yes  --    Machine Conductivity 13.7  --    Manual Conductivity 13.6  --    Manual Ph 7.4  --    Bleach Test (Neg) Yes  --    Bath Temperature 96.8 °F (36 °C)  --    Treatment Initiation   Dialyze Hours 2.5  --    Treatment  Initiation Universal Precautions maintained;Lines secured to patient; Connections secured;Prime given;Venous Parameters set; Arterial Parameters set; Hemosafe Device; Air foam detector engaged;Dialysate;Saline line double clamped; Hemo-Safe Applied;Dialyzer;F160  --    Dialysis Bath   K+ (Potassium) 2  --    Ca+ (Calcium) 2.5  --    Na+ (Sodium) 138  --    HCO3 (Bicarb) 32  --

## 2022-11-26 NOTE — PROGRESS NOTES
11/25/22 2007   NIV Type   NIV Started/Stopped On   Equipment Type v60   Mode CPAP   Mask Type Full face mask   Mask Size Medium   Settings/Measurements   CPAP/EPAP 5 cmH2O   Vt (Measured) 614 mL   FiO2  (S)  35 %   Mask Leak (lpm) 25 lpm   Comfort Level Good   Using Accessory Muscles No   Alarm Settings   Alarms On Y   Low Pressure (cmH2O) 6 cmH2O   High Pressure (cmH2O) 30 cmH2O

## 2022-11-26 NOTE — PROGRESS NOTES
11/26/22 0327   NIV Type   $NIV $Daily Charge   NIV Started/Stopped On   Equipment Type v60   Mode CPAP   Mask Type Full face mask   Mask Size Medium   Settings/Measurements   CPAP/EPAP 5 cmH2O   Vt (Measured) 350 mL   Resp 19   FiO2  35 %   Mask Leak (lpm) 15 lpm   Comfort Level Good   Using Accessory Muscles No   SpO2 97   Alarm Settings   Alarms On Y   Low Pressure (cmH2O) 6 cmH2O   High Pressure (cmH2O) 30 cmH2O

## 2022-11-26 NOTE — CONSULTS
[employer]  Nephrology Consult Note           Reason for Consult: ESRD  Requesting Physician:  Dr. Carolyne Shirley    Chief Complaint:    Chief Complaint   Patient presents with    Shortness of Breath     Pt to ED via EMS from home with c/o SOB for one day. Pt gets dialysis M,W,F, has not gone today yet. Denies any chest pain. Wears 3 L O2 at home at baseline, hx of COPD     History of Present Illness on 11/26/2022:    45 y.o. yo male with PMH of ESRD, CHF on baseline home oxygen at 3 L who is admitted for increased shortness of breath productive cough and low-grade fevers  Patient became very short of breath and came to the emergency room. Yesterday he missed his dialysis at his outpatient unit, in the evening at hospital, he became very short of breath and needed BiPAP placement.   Urgent dialysis was done with 4.1 L net UF over 3 hours and post weight of 102.5 kg    Past Medical History:        Diagnosis Date    Ambulatory dysfunction     walker for long distances, SOB with distance    Aortic stenosis     echo 2017    Arthritis     hands and hips    Asthma     Bilateral hilar adenopathy syndrome 6/3/2013    CAD (coronary artery disease)     Dr. Graylon Brunner [facility]) 04/19/2019    EF= 43%    CHF (congestive heart failure) ([address]     Chronic pain     COPD (chronic obstructive pulmonary disease) ([address]     pulmonology Dr. Celeste Less    Depression     Diabetes mellitus ([address]     borderline    Difficult intravenous access     Emphysema of lung ([address]     ESRD (end stage renal disease) on dialysis ([address]     MWF    Fear of needles     Gastric ulcer     GERD (gastroesophageal reflux disease)     Heart valve problem     bicuspic valve    Hemodialysis patient ([address]     History of spinal fracture     work incident    Hx of blood clots     Bilateral lower extremities; stents in place    Hyperlipidemia     Hypertension     MI (myocardial infarction) ([address] 2019    has had 9 MIs. 2019 was the last Neuromuscular disorder ([address]     due to CVA    Numbness and tingling in left arm     from fistula    Pneumonia     PONV (postoperative nausea and vomiting)     Prolonged emergence from general anesthesia     States requires more medication than most people    Sleep apnea     Uses CPAP    Stroke ([facility]     7mm thalamic cva 2017 deficts left side, left side weakness    TIA (transient ischemic attack)     Unspecified diseases of blood and blood-forming organs        Past Surgical History:        Procedure Laterality Date    AORTIC VALVE REPLACEMENT N/A 10/15/2019    TRANSCATHETER AORTIC VALVE REPLACEMENT FEMORAL APPROACH performed by Zohaib Daigle MD at [facility] Right 7/2/2019    PERITONEAL DIALYSIS CATHETER REMOVAL performed by Hakan Izaguirre MD at [place] [address]  2/29/2015    WN    CORONARY ANGIOPLASTY WITH STENT PLACEMENT  05/26/15    CYST REMOVAL  08/14/2013    EXCISION CYSTS, NECK X2 AND ABDOMINAL benign    DIAGNOSTIC CARDIAC CATH LAB PROCEDURE      DIALYSIS FISTULA CREATION Left 10/30/2017    LEFT BRACHIAL CEPHALIC FISTULA    DIALYSIS FISTULA CREATION Left 3/27/2019    LIGATION  AV FISTULA performed by Dion Lee MD at [id number] [place] [address], COLON, DIAGNOSTIC      IR TUNNELED CATHETER PLACEMENT GREATER THAN 5 YEARS  3/21/2022    IR TUNNELED CATHETER PLACEMENT GREATER THAN 5 YEARS 3/21/2022 [facility]    IR TUNNELED CATHETER PLACEMENT GREATER THAN 5 YEARS  4/21/2022    IR TUNNELED CATHETER PLACEMENT GREATER THAN 5 YEARS 4/21/2022 [facility]    IR TUNNELED CATHETER PLACEMENT GREATER THAN 5 YEARS  4/26/2022    IR TUNNELED CATHETER PLACEMENT GREATER THAN 5 YEARS 4/26/2022 [facility]    IR TUNNELED CATHETER PLACEMENT GREATER THAN 5 YEARS  6/23/2022    IR TUNNELED CATHETER PLACEMENT GREATER THAN 5 YEARS 6/23/2022 [facility]    OTHER SURGICAL HISTORY  02/01/2017    laparoscopic cholecystectomy with intraoperative cholangiogram    OTHER SURGICAL HISTORY  2018    PORT PLACEMENT  - vas cath    OTHER SURGICAL HISTORY Bilateral 06/26/2018    laprascopic peritoneal dialysis catheter placement    OTHER SURGICAL HISTORY Right 09/2018    peritoneal dialysis port placed on right side of abdomen    OTHER SURGICAL HISTORY  05/28/2019    PTA/Stenting R External Iliac artery    [place] LAP INSERTION TUNNELED INTRAPERITONEAL CATHETER N/A 9/21/2018    LAPAROSCOPIC PERITONEAL DIALYSIS CATHETER REPLACEMENT performed by Bailee Howell MD at [address] 2/24/2022    PERINEAL ABCESS DRAINAGE performed by Bailee Howell MD at [address] [place] [address] N/A 10/2/2022    THORACENTESIS ULTRASOUND performed by Zeinab Pereira MD at [address] [place] [address]  01/06/2016    UPPER GASTROINTESTINAL ENDOSCOPY  01/29/2017    possible candida, otherwise normal appearing    VASCULAR SURGERY  aprx 2 years ago    2 stents placed, each side of groin       Home Medications:    No current facility-administered medications on file prior to encounter.      Current Outpatient Medications on File Prior to Encounter   Medication Sig Dispense Refill    apixaban (ELIQUIS) 2.5 MG TABS tablet Take 1 tablet by mouth 2 times daily 60 tablet 0    metoprolol succinate (TOPROL XL) 100 MG extended release tablet Take 1 tablet by mouth in the morning and at bedtime 30 tablet 3    cyclobenzaprine (FLEXERIL) 5 MG tablet Muscle spasms      QUEtiapine (SEROQUEL) 50 MG tablet TAKE 1 TABLET BY MOUTH EVERY EVENING 30 tablet 10    isosorbide dinitrate (ISORDIL) 20 MG tablet Take 1 tablet by mouth 3 times daily 90 tablet 3    clopidogrel (PLAVIX) 75 MG tablet Take 1 tablet by mouth daily 90 tablet 3    pantoprazole (PROTONIX) 40 MG tablet TAKE 1 TABLET BY MOUTH EVERY MORNING BEFORE BREAKFAST 30 tablet 10    docusate sodium (DOK) 100 MG capsule Take 1 capsule by mouth daily (Patient taking differently: Take 100 mg by mouth as needed) 30 capsule 5    DULoxetine (CYMBALTA) 30 MG extended release capsule TAKE 1 CAPSULE BY MOUTH EVERY DAY 30 capsule 10    sennosides-docusate sodium (SENOKOT-S) 8.6-50 MG tablet Take 1 tablet by mouth daily (Patient taking differently: Take 1 tablet by mouth as needed) 10 tablet 0    pravastatin (PRAVACHOL) 40 MG tablet Take 1 tablet by mouth daily 90 tablet 3    B Complex-C-Folic Acid (VIRT-CAPS) 1 MG CAPS TAKE 1 CAPSULE BY MOUTH EVERY DAY 90 capsule 1    Calcium Acetate, Phos Binder, 667 MG CAPS TAKE 1 CAPSULE BY MOUTH THREE TIMES DAILY WITH MEALS 90 capsule 3    nitroGLYCERIN (NITROSTAT) 0.4 MG SL tablet DISSOLVE 1 TABLET UNDER THE TONGUE AS NEEDED FOR CHEST PAIN EVERY 5 MINUTES UP TO 3 TIMES. IF NO RELIEF CALL 911. 25 tablet 10    vitamin D (ERGOCALCIFEROL) 95390 units CAPS capsule TK 1 C PO WEEKLY  11    Alcohol Swabs PADS USE AS DIRECTED 300 each 3    ipratropium-albuterol (DUONEB) 0.5-2.5 (3) MG/3ML SOLN nebulizer solution Inhale 3 mLs into the lungs every 6 hours as needed for Shortness of Breath 360 mL 1    calcium carbonate (TUMS) 500 MG chewable tablet Take 1 tablet by mouth 3 times daily as needed for Heartburn.      gabapentin (NEURONTIN) 100 MG capsule Take 2 capsules by mouth 3 times daily as needed (neuropathic pain) for up to 10 days.  60 capsule 0       Allergies:  Morphine    Social History:    Social History     Socioeconomic History    Marital status:      Spouse name: Not on file    Number of children: Not on file    Years of education: Not on file    Highest education level: Not on file   Occupational History    Not on file   Tobacco Use    Smoking status: Former     Packs/day: 0.50     Years: 33.00     Pack years: 16.50     Types: Cigarettes     Quit date: 2020     Years since quittin.5    Smokeless tobacco: Never    Tobacco comments:     States quit 2021   Vaping Use    Vaping Use: Never used   Substance and Sexual Activity Alcohol use: Not Currently     Alcohol/week: 0.0 standard drinks     Comment: occ    Drug use: No    Sexual activity: Yes     Partners: Female     Comment:    Other Topics Concern    Not on file   Social History Narrative    Not on file     Social Determinants of Health     Financial Resource Strain: Not on file   Food Insecurity: Not on file   Transportation Needs: Not on file   Physical Activity: Not on file   Stress: Not on file   Social Connections: Not on file   Intimate Partner Violence: Not on file   Housing Stability: Not on file       Family History:   Family History   Problem Relation Age of Onset    Diabetes Mother     Heart Disease Father     Kidney Disease Sister         stage 4-kidney failure    Cancer Sister     Heart Disease Sister     Obesity Sister     Cancer Sister     Heart Disease Sister     Obesity Sister     Alcohol Abuse Brother        Review of Systems:   Pertinent positives stated above in HPI. All other 10 systems were reviewed and were negative.      Physical exam:   Constitutional:  VITALS:  BP (!) 143/62   Pulse 59   Temp 97.6 °F (36.4 °C)   Resp 18   Ht 5' 9\" (1.753 m)   Wt 251 lb 5.2 oz (114 kg)   SpO2 95%   BMI 37.11 kg/m²   Gen: alert, awake  Neck: No JVD  Skin: Unremarkable  Cardiovascular:  S1, S2 without m/r/g   Respiratory: CTA B without w/r/r; respiratory effort normal  Abdomen:  soft, nt, nd,   Extremities: Trace lower extremity edema  Neuro/Psy: AAoriented times 3 ; moves all 4 ext    Data/  Recent Labs     11/25/22  0950 11/26/22  0434   WBC 11.0 9.8   HGB 9.4* 9.2*   HCT 28.8* 28.4*   MCV 89.5 90.4    362     Recent Labs     11/25/22  0950 11/26/22  0434   * 134*   K 5.8* 4.9   CL 93* 93*   CO2 24 26   GLUCOSE 203* 134*   PHOS  --  4.4   MG 1.90 1.90   BUN 71* 51*   CREATININE 8.5* 6.3*   LABGLOM 7* 10*       Assessment  -ESRD on HD Monday Wednesday Friday  -Hyperkalemia  -Acute hypoxic respiratory failure status post   Emergent HD on 11/25/2022 with 4.1 kg of net UF  -Anemia  -CKD/MBD  -Pneumonia on antibiotics per primary team    Plan  -HD again on 11/26 with 2 L UF goal, continue Monday Wednesday Friday from next week   challenge target weight 101 kg  -EPO 3000 units with dialysis  -Renal dose medications    Okay to discharge from renal standpoint when okay with others    Thank you for the consultation. Please do not hesitate to call with questions. Roly Wan MD  Office: [phone]  Fax:    [phone]  [email or web address]

## 2022-11-26 NOTE — PROGRESS NOTES
Treatment time: 3 hrs     Net UF: 4.1 kg     Pre weight: 106.6 kg   Post weight: 102.5 kg   EDW: TBD     Access used: LTDC   Access function: Well     Medications or blood products given: None     Regular outpatient schedule: MWF     Summary of response to treatment: Pt tolerated tx well; some minor cramping noted during tx; UF goal lowered and cramping resolved     Copy of dialysis treatment record placed in chart, to be scanned into EMR.

## 2022-11-26 NOTE — H&P
Hospital Medicine History & Physical      PCP: Loren Colindres MD    Date of Admission: 11/25/2022    Date of Service: Pt seen/examined on 26 Nov and Admitted to Inpatient with expected LOS greater than two midnights due to medical therapy. Chief Complaint:  SOB      History Of Present Illness:        45 y.o. male w/ long cardiac hx as well as ESRD on HD M/W/F and CHF on baseline home O2 at 3L who presented to [facility] with a several day hx of increasing SOB w/ associated productive cough and subjective low-grade fevers. Sxs unresponsive to [facility] tx at home. Due to the holiday his HD was changed. The patient denies any other signs/sxs of systemic illness or identifiable aggravating/alleviating factors.       Past Medical History:          Diagnosis Date    Ambulatory dysfunction     walker for long distances, SOB with distance    Aortic stenosis     echo 2017    Arthritis     hands and hips    Asthma     Bilateral hilar adenopathy syndrome 6/3/2013    CAD (coronary artery disease)     Dr. Tiffany Rehman [facility]) 04/19/2019    EF= 43%    CHF (congestive heart failure) ([address]     Chronic pain     COPD (chronic obstructive pulmonary disease) ([address]     pulmonology Dr. Carlos Davidson    Depression     Diabetes mellitus ([address]     borderline    Difficult intravenous access     Emphysema of lung ([address]     ESRD (end stage renal disease) on dialysis ([address]     MWF    Fear of needles     Gastric ulcer     GERD (gastroesophageal reflux disease)     Heart valve problem     bicuspic valve    Hemodialysis patient ([address]     History of spinal fracture     work incident    Hx of blood clots     Bilateral lower extremities; stents in place    Hyperlipidemia     Hypertension     MI (myocardial infarction) ([address] 2019    has had 9 MIs. 2019 was the last    Neuromuscular disorder ([address]     due to CVA    Numbness and tingling in left arm     from fistula    Pneumonia     PONV (postoperative nausea and vomiting)     Prolonged emergence from general anesthesia     States requires more medication than most people    Sleep apnea     Uses CPAP    Stroke ([facility]     7mm thalamic cva 2017 deficts left side, left side weakness    TIA (transient ischemic attack)     Unspecified diseases of blood and blood-forming organs        Past Surgical History:          Procedure Laterality Date    AORTIC VALVE REPLACEMENT N/A 10/15/2019    TRANSCATHETER AORTIC VALVE REPLACEMENT FEMORAL APPROACH performed by Daina Belcher MD at [address] [facility] Right 7/2/2019    PERITONEAL DIALYSIS CATHETER REMOVAL performed by Annette Mai MD at [address] [place] [address]  2/29/2015    WN    CORONARY ANGIOPLASTY WITH STENT PLACEMENT  05/26/15    CYST REMOVAL  08/14/2013    EXCISION CYSTS, NECK X2 AND ABDOMINAL benign    DIAGNOSTIC CARDIAC CATH LAB PROCEDURE      DIALYSIS FISTULA CREATION Left 10/30/2017    LEFT BRACHIAL CEPHALIC FISTULA    DIALYSIS FISTULA CREATION Left 3/27/2019    LIGATION  AV FISTULA performed by Loren Wilburn MD at [id number] [place] [address], COLON, DIAGNOSTIC      IR TUNNELED CATHETER PLACEMENT GREATER THAN 5 YEARS  3/21/2022    IR TUNNELED CATHETER PLACEMENT GREATER THAN 5 YEARS 3/21/2022 [facility]    IR TUNNELED CATHETER PLACEMENT GREATER THAN 5 YEARS  4/21/2022    IR TUNNELED CATHETER PLACEMENT GREATER THAN 5 YEARS 4/21/2022 [facility]    IR TUNNELED CATHETER PLACEMENT GREATER THAN 5 YEARS  4/26/2022    IR TUNNELED CATHETER PLACEMENT GREATER THAN 5 YEARS 4/26/2022 [facility]    IR TUNNELED CATHETER PLACEMENT GREATER THAN 5 YEARS  6/23/2022    IR TUNNELED CATHETER PLACEMENT GREATER THAN 5 YEARS 6/23/2022 [facility]    OTHER SURGICAL HISTORY  02/01/2017    laparoscopic cholecystectomy with intraoperative cholangiogram    OTHER SURGICAL HISTORY  2018    PORT PLACEMENT  - vas cath    OTHER SURGICAL HISTORY Bilateral 06/26/2018    laprascopic peritoneal dialysis catheter placement    OTHER SURGICAL HISTORY Right 09/2018    peritoneal dialysis port placed on right side of abdomen    OTHER SURGICAL HISTORY  05/28/2019    PTA/Stenting R External Iliac artery    SD LAP INSERTION TUNNELED INTRAPERITONEAL CATHETER N/A 9/21/2018    LAPAROSCOPIC PERITONEAL DIALYSIS CATHETER REPLACEMENT performed by Son Palafox MD at [place] [address] 2/24/2022    PERINEAL ABCESS DRAINAGE performed by Son Palafox MD at [address] [place] [address] N/A 10/2/2022    THORACENTESIS ULTRASOUND performed by Ori Gonzalez MD at [facility] [address] [place] [address]  01/06/2016    UPPER GASTROINTESTINAL ENDOSCOPY  01/29/2017    possible candida, otherwise normal appearing    VASCULAR SURGERY  aprx 2 years ago    2 stents placed, each side of groin       Medications Prior to Admission:      Prior to Admission medications    Medication Sig Start Date End Date Taking?  Authorizing Provider   apixaban (ELIQUIS) 2.5 MG TABS tablet Take 1 tablet by mouth 2 times daily 11/1/22  Yes Anali Pantoja APRN - CNP   metoprolol succinate (TOPROL XL) 100 MG extended release tablet Take 1 tablet by mouth in the morning and at bedtime 7/21/22  Yes Pauly Patterson MD   cyclobenzaprine (FLEXERIL) 5 MG tablet Muscle spasms 7/21/22  Yes Pauly Patterson MD   QUEtiapine (SEROQUEL) 50 MG tablet TAKE 1 TABLET BY MOUTH EVERY EVENING 6/30/22  Yes Santiago Clark MD   isosorbide dinitrate (ISORDIL) 20 MG tablet Take 1 tablet by mouth 3 times daily 6/8/22  Yes JASE Hill   clopidogrel (PLAVIX) 75 MG tablet Take 1 tablet by mouth daily 5/9/22  Yes Yolie Hdz APRN - CNP   pantoprazole (PROTONIX) 40 MG tablet TAKE 1 TABLET BY MOUTH EVERY MORNING BEFORE BREAKFAST 4/28/22  Yes Santiago Clark MD   docusate sodium (DOK) 100 MG capsule Take 1 capsule by mouth daily  Patient taking differently: Take 100 mg by mouth as needed 4/27/22  Yes Tip Ewing MD   DULoxetine (CYMBALTA) 30 MG extended release capsule TAKE 1 CAPSULE BY MOUTH EVERY DAY 3/29/22  Yes Loren Colindres MD   sennosides-docusate sodium (SENOKOT-S) 8.6-50 MG tablet Take 1 tablet by mouth daily  Patient taking differently: Take 1 tablet by mouth as needed 3/9/22  Yes Loren Colindres MD   pravastatin (PRAVACHOL) 40 MG tablet Take 1 tablet by mouth daily 2/10/22  Yes JAY Perkins - CNP   B Complex-C-Folic Acid (VIRT-CAPS) 1 MG CAPS TAKE 1 CAPSULE BY MOUTH EVERY DAY 9/20/21  Yes Loren Colindres MD   Calcium Acetate, Phos Binder, 667 MG CAPS TAKE 1 CAPSULE BY MOUTH THREE TIMES DAILY WITH MEALS 8/12/21  Yes Loren Colindres MD   nitroGLYCERIN (NITROSTAT) 0.4 MG SL tablet DISSOLVE 1 TABLET UNDER THE TONGUE AS NEEDED FOR CHEST PAIN EVERY 5 MINUTES UP TO 3 TIMES. IF NO RELIEF CALL 911. 1/7/21  Yes Loren Colindres MD   vitamin D (ERGOCALCIFEROL) 76957 units CAPS capsule TK 1 C PO WEEKLY 6/2/19  Yes Historical Provider, MD   Alcohol Swabs PADS USE AS DIRECTED 4/25/18  Yes Rico Mckeon MD   ipratropium-albuterol (DUONEB) 0.5-2.5 (3) MG/3ML SOLN nebulizer solution Inhale 3 mLs into the lungs every 6 hours as needed for Shortness of Breath 10/15/17  Yes Chantelle Carter MD   calcium carbonate (TUMS) 500 MG chewable tablet Take 1 tablet by mouth 3 times daily as needed for Heartburn. Yes Historical Provider, MD   gabapentin (NEURONTIN) 100 MG capsule Take 2 capsules by mouth 3 times daily as needed (neuropathic pain) for up to 10 days. 10/20/22 10/30/22  Fe Wilson MD       Allergies:  Morphine    Social History:      The patient currently lives independently    TOBACCO:   reports that he quit smoking about 2 years ago. His smoking use included cigarettes. He has a 16.50 pack-year smoking history. He has never used smokeless tobacco.  ETOH:   reports that he does not currently use alcohol. Family History:      Reviewed in detail and negative for CVA. Positive as follows:        Problem Relation Age of Onset    Diabetes Mother     Heart Disease Father     Kidney Disease Sister         stage 4-kidney failure    Cancer Sister     Heart Disease Sister     Obesity Sister     Cancer Sister     Heart Disease Sister     Obesity Sister     Alcohol Abuse Brother        REVIEW OF SYSTEMS:   Pertinent positives as noted in the HPI. All other systems reviewed and negative. PHYSICAL EXAM:    BP (!) 157/94   Pulse 78   Temp 98.1 °F (36.7 °C) (Oral)   Resp 20   Ht 5' 9\" (1.753 m)   Wt 251 lb 5.2 oz (114 kg)   SpO2 95%   BMI 37.11 kg/m²     General appearance:  No apparent distress, appears stated age and cooperative. HEENT:  Normal cephalic, atraumatic without obvious deformity. Pupils equal, round, and reactive to light. Extra ocular muscles intact. Conjunctivae/corneas clear. Neck: Supple, with full range of motion. No jugular venous distention. Trachea midline. Respiratory:  Normal respiratory effort. Clear to auscultation, bilaterally without Rales/Wheezes/Rhonchi. Cardiovascular:  Regular rate and rhythm with normal S1/S2 without murmurs, rubs or gallops. Abdomen: Soft, non-tender, non-distended with normal bowel sounds. Musculoskeletal:  No clubbing, cyanosis or edema bilaterally. Full range of motion without deformity. Skin: Skin color, texture, turgor normal.  No rashes or lesions. Neurologic:  Neurovascularly intact without any focal sensory/motor deficits. Cranial nerves: II-XII intact, grossly non-focal.  Psychiatric:  Alert and oriented, thought content appropriate, normal insight  Capillary Refill: Brisk,< 3 seconds   Peripheral Pulses: +2 palpable, equal bilaterally       CXR:  I have reviewed the CXR with the following interpretation: L basilar ASD and pleural effusion  EKG:  I have reviewed the EKG with the following interpretation: NSR w/out acute ischemic changes.      Labs:     Recent Labs     11/25/22  0950 11/26/22  0434   WBC 11.0 9.8 HGB 9.4* 9.2*   HCT 28.8* 28.4*    362     Recent Labs     11/25/22  0950 11/26/22  0434   * 134*   K 5.8* 4.9   CL 93* 93*   CO2 24 26   BUN 71* 51*   CREATININE 8.5* 6.3*   CALCIUM 8.1* 8.2*   PHOS  --  4.4     Recent Labs     11/25/22  0950   AST 8*   ALT 9*   BILITOT 0.3   ALKPHOS 78     No results for input(s): INR in the last 72 hours. Recent Labs     11/25/22  0950 11/25/22  1203   TROPONINI 0.18* 0.19*       Urinalysis:      Lab Results   Component Value Date/Time    NITRU Negative 05/29/2022 12:51 PM    WBCUA 10-20 05/29/2022 12:51 PM    BACTERIA 3+ 05/29/2022 12:51 PM    RBCUA 5-10 05/29/2022 12:51 PM    BLOODU TRACE-INTACT 05/29/2022 12:51 PM    SPECGRAV 1.015 05/29/2022 12:51 PM    GLUCOSEU 100 05/29/2022 12:51 PM         Consults:    IP CONSULT TO NEPHROLOGY      ASSESSMENT:    Active Hospital Problems    Diagnosis Date Noted    Acute pneumonia [J18.9] 11/25/2022     Priority: Medium       PLAN:      PNA - likely CAP w/ Strep Pneumonia. Started on empiric Rocephin/Azithro on 25 Nov.  Changed to DAILY dosing. ESRD - on HD M/W/F. Nephrology consulted and appreciated. Troponin elevation - of unclear clinical significance w/ etiology clinically unable to determine, w/out signs/sxs of active ischemia. Follow serial troponins. Reviewed and documented as above. Anemia - etiology clinically unable to determine, w/out evidence of active bleeding/hemolysis. Stable and asymptomatic w/out indication for transfusion. Follow serial labs. Reviewed and documented as above. Obesity -  With Body mass index is 37.11 kg/m². Complicating assessment and treatment. Placing patient at risk for multiple co-morbidities as well as early death and contributing to the patient's presentation. Counseled on weight loss. DVT Prophylaxis: Eliquis    Diet: ADULT DIET; Regular; Low Sodium (2 gm); Low Potassium (Less than 3000 mg/day);  Low Phosphorus (Less than 1000 mg); 1200 ml  Code Status: Full Code      PT/OT Eval Status: not yet ordered    Dispo - Patient is likely to remain in-house at least until Monday/Tues 28/29 Nov pending clinical course and subspecialty Abena Staton MD    Thank you Caio Martínez MD for the opportunity to be involved in this patient's care. If you have any questions or concerns please feel free to contact me at [phone].

## 2022-11-27 LAB
ALBUMIN SERPL-MCNC: 3.7 G/DL (ref 3.4–5)
ANION GAP SERPL CALCULATED.3IONS-SCNC: 17 MMOL/L (ref 3–16)
BUN BLDV-MCNC: 53 MG/DL (ref 7–20)
CALCIUM SERPL-MCNC: 8.2 MG/DL (ref 8.3–10.6)
CHLORIDE BLD-SCNC: 94 MMOL/L (ref 99–110)
CO2: 25 MMOL/L (ref 21–32)
CREAT SERPL-MCNC: 6.1 MG/DL (ref 0.9–1.3)
GFR SERPL CREATININE-BSD FRML MDRD: 10 ML/MIN/{1.73_M2}
GLUCOSE BLD-MCNC: 119 MG/DL (ref 70–99)
GLUCOSE BLD-MCNC: 135 MG/DL (ref 70–99)
GLUCOSE BLD-MCNC: 143 MG/DL (ref 70–99)
GLUCOSE BLD-MCNC: 148 MG/DL (ref 70–99)
HCT VFR BLD CALC: 29.8 % (ref 40.5–52.5)
HEMOGLOBIN: 9.3 G/DL (ref 13.5–17.5)
MAGNESIUM: 1.9 MG/DL (ref 1.8–2.4)
MCH RBC QN AUTO: 28.7 PG (ref 26–34)
MCHC RBC AUTO-ENTMCNC: 31.4 G/DL (ref 31–36)
MCV RBC AUTO: 91.6 FL (ref 80–100)
PDW BLD-RTO: 16.5 % (ref 12.4–15.4)
PERFORMED ON: ABNORMAL
PHOSPHORUS: 5.8 MG/DL (ref 2.5–4.9)
PLATELET # BLD: 389 K/UL (ref 135–450)
PMV BLD AUTO: 7.9 FL (ref 5–10.5)
POTASSIUM SERPL-SCNC: 4.7 MMOL/L (ref 3.5–5.1)
RBC # BLD: 3.25 M/UL (ref 4.2–5.9)
SODIUM BLD-SCNC: 136 MMOL/L (ref 136–145)
WBC # BLD: 9.4 K/UL (ref 4–11)

## 2022-11-27 PROCEDURE — 36415 COLL VENOUS BLD VENIPUNCTURE: CPT

## 2022-11-27 PROCEDURE — 6360000002 HC RX W HCPCS: Performed by: INTERNAL MEDICINE

## 2022-11-27 PROCEDURE — 2580000003 HC RX 258: Performed by: INTERNAL MEDICINE

## 2022-11-27 PROCEDURE — 94761 N-INVAS EAR/PLS OXIMETRY MLT: CPT

## 2022-11-27 PROCEDURE — 94660 CPAP INITIATION&MGMT: CPT

## 2022-11-27 PROCEDURE — 2060000000 HC ICU INTERMEDIATE R&B

## 2022-11-27 PROCEDURE — 2700000000 HC OXYGEN THERAPY PER DAY

## 2022-11-27 PROCEDURE — 83735 ASSAY OF MAGNESIUM: CPT

## 2022-11-27 PROCEDURE — 6370000000 HC RX 637 (ALT 250 FOR IP): Performed by: INTERNAL MEDICINE

## 2022-11-27 PROCEDURE — 80069 RENAL FUNCTION PANEL: CPT

## 2022-11-27 PROCEDURE — 6370000000 HC RX 637 (ALT 250 FOR IP): Performed by: NURSE PRACTITIONER

## 2022-11-27 PROCEDURE — 85027 COMPLETE CBC AUTOMATED: CPT

## 2022-11-27 RX ADMIN — OXYCODONE AND ACETAMINOPHEN 1 TABLET: 7.5; 325 TABLET ORAL at 19:45

## 2022-11-27 RX ADMIN — PANTOPRAZOLE SODIUM 40 MG: 40 TABLET, DELAYED RELEASE ORAL at 10:55

## 2022-11-27 RX ADMIN — CYCLOBENZAPRINE 5 MG: 10 TABLET, FILM COATED ORAL at 19:45

## 2022-11-27 RX ADMIN — AZITHROMYCIN MONOHYDRATE 250 MG: 250 TABLET ORAL at 11:22

## 2022-11-27 RX ADMIN — CLOPIDOGREL BISULFATE 75 MG: 75 TABLET ORAL at 10:56

## 2022-11-27 RX ADMIN — ISOSORBIDE DINITRATE 20 MG: 20 TABLET ORAL at 14:58

## 2022-11-27 RX ADMIN — PRAVASTATIN SODIUM 40 MG: 40 TABLET ORAL at 10:56

## 2022-11-27 RX ADMIN — SODIUM CHLORIDE, PRESERVATIVE FREE 10 ML: 5 INJECTION INTRAVENOUS at 21:53

## 2022-11-27 RX ADMIN — ISOSORBIDE DINITRATE 20 MG: 20 TABLET ORAL at 19:42

## 2022-11-27 RX ADMIN — CEFTRIAXONE SODIUM 1000 MG: 1 INJECTION, POWDER, FOR SOLUTION INTRAMUSCULAR; INTRAVENOUS at 11:22

## 2022-11-27 RX ADMIN — SODIUM CHLORIDE, PRESERVATIVE FREE 10 ML: 5 INJECTION INTRAVENOUS at 10:56

## 2022-11-27 RX ADMIN — METOPROLOL SUCCINATE 100 MG: 50 TABLET, EXTENDED RELEASE ORAL at 10:55

## 2022-11-27 RX ADMIN — DULOXETINE HYDROCHLORIDE 30 MG: 30 CAPSULE, DELAYED RELEASE ORAL at 10:55

## 2022-11-27 RX ADMIN — APIXABAN 2.5 MG: 2.5 TABLET, FILM COATED ORAL at 10:55

## 2022-11-27 RX ADMIN — ISOSORBIDE DINITRATE 20 MG: 20 TABLET ORAL at 10:55

## 2022-11-27 RX ADMIN — QUETIAPINE FUMARATE 50 MG: 25 TABLET ORAL at 19:42

## 2022-11-27 RX ADMIN — APIXABAN 2.5 MG: 2.5 TABLET, FILM COATED ORAL at 19:42

## 2022-11-27 RX ADMIN — METOPROLOL SUCCINATE 100 MG: 50 TABLET, EXTENDED RELEASE ORAL at 19:42

## 2022-11-27 ASSESSMENT — PAIN SCALES - GENERAL: PAINLEVEL_OUTOF10: 3

## 2022-11-27 ASSESSMENT — PAIN DESCRIPTION - ORIENTATION: ORIENTATION: LOWER

## 2022-11-27 ASSESSMENT — PAIN DESCRIPTION - LOCATION: LOCATION: BACK

## 2022-11-27 NOTE — PROGRESS NOTES
11/26/22 5878   NIV Type   NIV Started/Stopped On   Equipment Type v60   Mode CPAP   Mask Type Full face mask   Settings/Measurements   PIP Observed 6 cm H20   CPAP/EPAP 5 cmH2O   Vt (Measured) 388 mL   Resp 20   FiO2  35 %   Minute Volume (L/min) 7.9 Liters   Mask Leak (lpm) 0 lpm   Comfort Level Good   Using Accessory Muscles No   SpO2 97   Patient's Home Machine No   Alarm Settings   Alarms On Y   Low Pressure (cmH2O) 3 cmH2O   High Pressure (cmH2O) 30 cmH2O   Delay Alarm 20 sec(s)   RR Low (bpm) 6   RR High (bpm) 40 br/min

## 2022-11-27 NOTE — PROGRESS NOTES
11/26/22 2107   NIV Type   Skin Assessment Clean, dry, & intact   Skin Protection for O2 Device No  (pt refused)   NIV Started/Stopped On   Equipment Type v60   Mode CPAP   Mask Type Full face mask   Settings/Measurements   PIP Observed 12 cm H20   CPAP/EPAP 5 cmH2O   Vt (Measured) 584 mL   Resp 22   FiO2  35 %   Minute Volume (L/min) 18 Liters   Mask Leak (lpm) 0 lpm   Comfort Level Good   Using Accessory Muscles No   SpO2 98   Alarm Settings   Alarms On Y   Low Pressure (cmH2O) 6 cmH2O   High Pressure (cmH2O) 30 cmH2O   Apnea (secs) 20 secs   RR Low (bpm) 6   RR High (bpm) 40 br/min

## 2022-11-27 NOTE — PROGRESS NOTES
11/27/22 0421   NIV Type   NIV Started/Stopped On   Equipment Type v60   Mode CPAP   Mask Type Full face mask   Settings/Measurements   PIP Observed 6 cm H20   CPAP/EPAP 5 cmH2O   Vt (Measured) 536 mL   Resp 19   FiO2  28 %   Minute Volume (L/min) 10.4 Liters   Mask Leak (lpm) 1 lpm   Comfort Level Good   Using Accessory Muscles No   SpO2 95   Patient's Home Machine No   Alarm Settings   Alarms On Y   Low Pressure (cmH2O) 3 cmH2O   High Pressure (cmH2O) 30 cmH2O   Delay Alarm 20 sec(s)   RR Low (bpm) 6   RR High (bpm) 40 br/min

## 2022-11-27 NOTE — PROGRESS NOTES
[facility]  Nephrology f/u Note           Reason for Consult: ESRD  Requesting Physician:  Dr. Mega Collins history  Resting     HD on 11/26 w 2l net UF , post wt 112 kg ? Bed wt    ROS: No chest pain/shortness of breath/fever/nausea/vomiting  PSFH: No visitor    Scheduled Meds:   epoetin siddharth-epbx  3,000 Units IntraVENous Once per day on Mon Wed Fri    apixaban  2.5 mg Oral BID    clopidogrel  75 mg Oral Daily    DULoxetine  30 mg Oral Daily    isosorbide dinitrate  20 mg Oral TID    metoprolol succinate  100 mg Oral BID    pantoprazole  40 mg Oral QAM AC    pravastatin  40 mg Oral Daily    QUEtiapine  50 mg Oral Nightly    sodium chloride flush  5-40 mL IntraVENous 2 times per day    cefTRIAXone (ROCEPHIN) IV  1,000 mg IntraVENous Daily    azithromycin  250 mg Oral Daily    insulin lispro  0-4 Units SubCUTAneous TID WC    insulin lispro  0-4 Units SubCUTAneous Nightly     Continuous Infusions:   sodium chloride      dextrose       PRN Meds:.heparin (porcine), oxyCODONE-acetaminophen, cyclobenzaprine, nitroGLYCERIN, sodium chloride flush, sodium chloride, ondansetron **OR** ondansetron, polyethylene glycol, acetaminophen **OR** acetaminophen, glucose, dextrose bolus **OR** dextrose bolus, glucagon (rDNA), dextrose, hydrOXYzine HCl, ipratropium-albuterol    History of Present Illness on 11/26/2022:    45 y.o. yo male with PMH of ESRD, CHF on baseline home oxygen at 3 L who is admitted for increased shortness of breath productive cough and low-grade fevers  Patient became very short of breath and came to the emergency room. Yesterday he missed his dialysis at his outpatient unit, in the evening at hospital, he became very short of breath and needed BiPAP placement.   Urgent dialysis was done with 4.1 L net UF over 3 hours and post weight of 102.5 kg    Physical exam:   Constitutional:  VITALS:  /67   Pulse 72   Temp 98 °F (36.7 °C) (Oral)   Resp 18   Ht 5' 9\" (1.753 m)   Wt 246 lb 14.6 oz (112 kg)   SpO2 98%   BMI 36.46 kg/m²   Gen: alert, awake  Neck: No JVD  Skin: Unremarkable  Cardiovascular:  S1, S2 without m/r/g   Respiratory: CTA B without w/r/r; respiratory effort normal  Abdomen:  soft, nt, nd,   Extremities: Trace lower extremity edema  Neuro/Psy: AAoriented times 3 ; moves all 4 ext    Data/  Recent Labs     11/25/22  0950 11/26/22  0434 11/27/22 0447   WBC 11.0 9.8 9.4   HGB 9.4* 9.2* 9.3*   HCT 28.8* 28.4* 29.8*   MCV 89.5 90.4 91.6    362 389       Recent Labs     11/25/22  0950 11/26/22 0434 11/27/22 0447   * 134* 136   K 5.8* 4.9 4.7   CL 93* 93* 94*   CO2 24 26 25   GLUCOSE 203* 134* 119*   PHOS  --  4.4 5.8*   MG 1.90 1.90 1.90   BUN 71* 51* 53*   CREATININE 8.5* 6.3* 6.1*   LABGLOM 7* 10* 10*         Assessment  -ESRD on HD Monday Wednesday Friday  -Hyperkalemia  -Acute hypoxic respiratory failure status post   Emergent HD on 11/25/2022 with 4.1 kg of net UF  -Anemia  -CKD/MBD  -Pneumonia on antibiotics per primary team    Plan  -HD per Monday Wednesday Friday from next week   challenge target weight 101 kg  -EPO 3000 units with dialysis  -Renal dose medications    Thank you for the consultation. Please do not hesitate to call with questions. Herbert Layton MD  Office: [phone]  Fax:    [phone]  [email or web address]

## 2022-11-27 NOTE — PROGRESS NOTES
Hospitalist Progress Note      PCP: Augustin Dominguez MD    Date of Admission: 11/25/2022    Chief Complaint: SOB    Subjective: no new c/o. Medications:  Reviewed    Infusion Medications    sodium chloride      dextrose       Scheduled Medications    epoetin siddharth-epbx  3,000 Units IntraVENous Once per day on Mon Wed Fri    apixaban  2.5 mg Oral BID    clopidogrel  75 mg Oral Daily    DULoxetine  30 mg Oral Daily    isosorbide dinitrate  20 mg Oral TID    metoprolol succinate  100 mg Oral BID    pantoprazole  40 mg Oral QAM AC    pravastatin  40 mg Oral Daily    QUEtiapine  50 mg Oral Nightly    sodium chloride flush  5-40 mL IntraVENous 2 times per day    cefTRIAXone (ROCEPHIN) IV  1,000 mg IntraVENous Daily    azithromycin  250 mg Oral Daily    insulin lispro  0-4 Units SubCUTAneous TID WC    insulin lispro  0-4 Units SubCUTAneous Nightly     PRN Meds: heparin (porcine), oxyCODONE-acetaminophen, cyclobenzaprine, nitroGLYCERIN, sodium chloride flush, sodium chloride, ondansetron **OR** ondansetron, polyethylene glycol, acetaminophen **OR** acetaminophen, glucose, dextrose bolus **OR** dextrose bolus, glucagon (rDNA), dextrose, hydrOXYzine HCl, ipratropium-albuterol      Intake/Output Summary (Last 24 hours) at 11/27/2022 0917  Last data filed at 11/26/2022 2124  Gross per 24 hour   Intake 200 ml   Output 0 ml   Net 200 ml       Physical Exam Performed:    /73   Pulse 72   Temp 97.4 °F (36.3 °C)   Resp 19   Ht 5' 9\" (1.753 m)   Wt 246 lb 14.6 oz (112 kg)   SpO2 97%   BMI 36.46 kg/m²     General appearance: No apparent distress, appears stated age and cooperative. HEENT: Pupils equal, round, and reactive to light. Conjunctivae/corneas clear. Neck: Supple, with full range of motion. No jugular venous distention. Trachea midline. Respiratory:  Normal respiratory effort. Clear to auscultation, bilaterally without Rales/Wheezes/Rhonchi.   Cardiovascular: Regular rate and rhythm with normal S1/S2 without murmurs, rubs or gallops. Abdomen: Soft, non-tender, non-distended with normal bowel sounds. Musculoskeletal: No clubbing, cyanosis or edema bilaterally. Full range of motion without deformity. Skin: Skin color, texture, turgor normal.  No rashes or lesions. Neurologic:  Neurovascularly intact without any focal sensory/motor deficits. Cranial nerves: II-XII intact, grossly non-focal.  Psychiatric: Alert and oriented, thought content appropriate, normal insight  Capillary Refill: Brisk,< 3 seconds   Peripheral Pulses: +2 palpable, equal bilaterally       Labs:   Recent Labs     11/25/22  0950 11/26/22  0434 11/27/22  0447   WBC 11.0 9.8 9.4   HGB 9.4* 9.2* 9.3*   HCT 28.8* 28.4* 29.8*    362 389     Recent Labs     11/25/22  0950 11/26/22  0434 11/27/22  0447   * 134* 136   K 5.8* 4.9 4.7   CL 93* 93* 94*   CO2 24 26 25   BUN 71* 51* 53*   CREATININE 8.5* 6.3* 6.1*   CALCIUM 8.1* 8.2* 8.2*   PHOS  --  4.4 5.8*     Recent Labs     11/25/22  0950   AST 8*   ALT 9*   BILITOT 0.3   ALKPHOS 78     No results for input(s): INR in the last 72 hours. Recent Labs     11/25/22  0950 11/25/22  1203   TROPONINI 0.18* 0.19*       Urinalysis:      Lab Results   Component Value Date/Time    NITRU Negative 05/29/2022 12:51 PM    WBCUA 10-20 05/29/2022 12:51 PM    BACTERIA 3+ 05/29/2022 12:51 PM    RBCUA 5-10 05/29/2022 12:51 PM    BLOODU TRACE-INTACT 05/29/2022 12:51 PM    SPECGRAV 1.015 05/29/2022 12:51 PM    GLUCOSEU 100 05/29/2022 12:51 PM       Consults:    IP CONSULT TO NEPHROLOGY      Assessment/Plan:    Active Hospital Problems    Diagnosis     Acute pneumonia [J18.9]      Priority: Medium          PNA - likely CAP w/ Strep Pneumonia. Started on empiric Rocephin/Azithro on 25 Nov.  Changed to DAILY dosing. ESRD - on HD M/W/F. Nephrology consulted and appreciated.  Last HD Sat 26 Nov.     Troponin elevation - of unclear clinical significance w/ etiology likely 2nd to ESRD, w/out signs/sxs of active ischemia. Follow serial troponins. Reviewed and documented as above. Anemia - likely 2nd to CKD, w/out evidence of active bleeding/hemolysis. Stable and asymptomatic w/out indication for transfusion. Follow serial labs. Reviewed and documented as above. Obesity -  With Body mass index is 37.11 kg/m². Complicating assessment and treatment. Placing patient at risk for multiple co-morbidities as well as early death and contributing to the patient's presentation. Counseled on weight loss. DVT Prophylaxis: Eliquis       Recent Labs     11/25/22  0950 11/26/22  0434 11/27/22  0447    362 389     Diet: ADULT DIET; Regular; Low Sodium (2 gm); Low Potassium (Less than 3000 mg/day);  Low Phosphorus (Less than 1000 mg); 1200 ml  Code Status: Full Code      PT/OT Eval Status: not yet ordered     Dispo - Patient is likely to remain in-house at least until Monday/Tues 28/29 Nov pending clinical course and subspecialty Yuan Low MD

## 2022-11-28 VITALS
HEIGHT: 69 IN | SYSTOLIC BLOOD PRESSURE: 132 MMHG | DIASTOLIC BLOOD PRESSURE: 78 MMHG | RESPIRATION RATE: 18 BRPM | TEMPERATURE: 98.1 F | BODY MASS INDEX: 37.55 KG/M2 | WEIGHT: 253.53 LBS | OXYGEN SATURATION: 98 % | HEART RATE: 63 BPM

## 2022-11-28 LAB
ALBUMIN SERPL-MCNC: 3.8 G/DL (ref 3.4–5)
ANION GAP SERPL CALCULATED.3IONS-SCNC: 17 MMOL/L (ref 3–16)
BUN BLDV-MCNC: 78 MG/DL (ref 7–20)
CALCIUM SERPL-MCNC: 7.5 MG/DL (ref 8.3–10.6)
CHLORIDE BLD-SCNC: 91 MMOL/L (ref 99–110)
CO2: 23 MMOL/L (ref 21–32)
CREAT SERPL-MCNC: 8 MG/DL (ref 0.9–1.3)
GFR SERPL CREATININE-BSD FRML MDRD: 7 ML/MIN/{1.73_M2}
GLUCOSE BLD-MCNC: 137 MG/DL (ref 70–99)
GLUCOSE BLD-MCNC: 161 MG/DL (ref 70–99)
HCT VFR BLD CALC: 28.4 % (ref 40.5–52.5)
HEMOGLOBIN: 9.1 G/DL (ref 13.5–17.5)
MCH RBC QN AUTO: 29.1 PG (ref 26–34)
MCHC RBC AUTO-ENTMCNC: 32.1 G/DL (ref 31–36)
MCV RBC AUTO: 90.5 FL (ref 80–100)
PDW BLD-RTO: 16.1 % (ref 12.4–15.4)
PERFORMED ON: ABNORMAL
PHOSPHORUS: 7.3 MG/DL (ref 2.5–4.9)
PLATELET # BLD: 387 K/UL (ref 135–450)
PMV BLD AUTO: 7.8 FL (ref 5–10.5)
POTASSIUM SERPL-SCNC: 5.1 MMOL/L (ref 3.5–5.1)
RBC # BLD: 3.14 M/UL (ref 4.2–5.9)
SODIUM BLD-SCNC: 131 MMOL/L (ref 136–145)
WBC # BLD: 8.5 K/UL (ref 4–11)

## 2022-11-28 PROCEDURE — 85027 COMPLETE CBC AUTOMATED: CPT

## 2022-11-28 PROCEDURE — 2700000000 HC OXYGEN THERAPY PER DAY

## 2022-11-28 PROCEDURE — 6370000000 HC RX 637 (ALT 250 FOR IP): Performed by: INTERNAL MEDICINE

## 2022-11-28 PROCEDURE — 90935 HEMODIALYSIS ONE EVALUATION: CPT

## 2022-11-28 PROCEDURE — 94761 N-INVAS EAR/PLS OXIMETRY MLT: CPT

## 2022-11-28 PROCEDURE — 80069 RENAL FUNCTION PANEL: CPT

## 2022-11-28 PROCEDURE — 2580000003 HC RX 258: Performed by: INTERNAL MEDICINE

## 2022-11-28 PROCEDURE — 94660 CPAP INITIATION&MGMT: CPT

## 2022-11-28 PROCEDURE — 6360000002 HC RX W HCPCS

## 2022-11-28 PROCEDURE — 36415 COLL VENOUS BLD VENIPUNCTURE: CPT

## 2022-11-28 PROCEDURE — 6370000000 HC RX 637 (ALT 250 FOR IP): Performed by: NURSE PRACTITIONER

## 2022-11-28 RX ORDER — AZITHROMYCIN 250 MG/1
250 TABLET, FILM COATED ORAL DAILY
Qty: 4 TABLET | Refills: 0 | Status: SHIPPED | OUTPATIENT
Start: 2022-11-28 | End: 2022-12-02

## 2022-11-28 RX ADMIN — APIXABAN 2.5 MG: 2.5 TABLET, FILM COATED ORAL at 08:30

## 2022-11-28 RX ADMIN — AZITHROMYCIN MONOHYDRATE 250 MG: 250 TABLET ORAL at 15:02

## 2022-11-28 RX ADMIN — PRAVASTATIN SODIUM 40 MG: 40 TABLET ORAL at 08:30

## 2022-11-28 RX ADMIN — OXYCODONE AND ACETAMINOPHEN 1 TABLET: 7.5; 325 TABLET ORAL at 08:34

## 2022-11-28 RX ADMIN — SODIUM CHLORIDE, PRESERVATIVE FREE 10 ML: 5 INJECTION INTRAVENOUS at 08:31

## 2022-11-28 RX ADMIN — METOPROLOL SUCCINATE 100 MG: 50 TABLET, EXTENDED RELEASE ORAL at 08:30

## 2022-11-28 RX ADMIN — DULOXETINE HYDROCHLORIDE 30 MG: 30 CAPSULE, DELAYED RELEASE ORAL at 08:30

## 2022-11-28 RX ADMIN — ISOSORBIDE DINITRATE 20 MG: 20 TABLET ORAL at 15:02

## 2022-11-28 RX ADMIN — EPOETIN ALFA-EPBX 3000 UNITS: 3000 INJECTION, SOLUTION INTRAVENOUS; SUBCUTANEOUS at 13:55

## 2022-11-28 RX ADMIN — CLOPIDOGREL BISULFATE 75 MG: 75 TABLET ORAL at 08:30

## 2022-11-28 ASSESSMENT — PAIN SCALES - GENERAL
PAINLEVEL_OUTOF10: 4
PAINLEVEL_OUTOF10: 0

## 2022-11-28 NOTE — PROGRESS NOTES
Treatment time: 3 hours  Net UF: 3000 ml     Pre weight: 118.0 kg  Post weight:115.0 kg  EDW: TBD kg    Access used: LUAVF    Access function: well with  ml/min     Medications or blood products given: retacrit 3,000 units     Regular outpatient schedule: MWF     Summary of response to treatment: Patient tolerated treatment well and without any complications. Patient remained stable throughout entire treatment       11/28/22 1130 11/28/22 1445   Treatment   Time On 1136  --    Time Off  --  1440   Treatment Goal 3400  --    Vital Signs   /72 132/78   Temp 98.3 °F (36.8 °C) 98.1 °F (36.7 °C)   Heart Rate 63 63   Resp 18 18   Weight 260 lb 2.3 oz (118 kg) 253 lb 8.5 oz (115 kg)   Technical Checks   Machine Alarm Self Test Completed; Passed  --    Dialysis Bath   K+ (Potassium) 2  --    Ca+ (Calcium) 2.5  --    Na+ (Sodium) 138  --    HCO3 (Bicarb) 32  --

## 2022-11-28 NOTE — DISCHARGE INSTR - COC
Continuity of Care Form    Patient Name: Jonas Hair   :  1970  MRN:  [id number]    Admit date:  2022  Discharge date:  2022    Code Status Order: Full Code   Advance Directives:     Admitting Physician:  Osmar Hamlin MD  PCP: Cuco Peguero MD    Discharging Nurse: [facility] Unit/Room#: 0107/0107-01  Discharging Unit Phone Number: ***    Emergency Contact:   Extended Emergency Contact Information  Primary Emergency Contact: Crystal Ann  Address: [address] [facility], [address] [facility] Phone: [phone]  Mobile Phone: [phone]  Relation: Spouse  Secondary Emergency Contact: [address] [place] [address]  Mobile Phone: [phone]  Relation: Brother/Sister  Preferred language: English   needed?  No    Past Surgical History:  Past Surgical History:   Procedure Laterality Date    AORTIC VALVE REPLACEMENT N/A 10/15/2019    TRANSCATHETER AORTIC VALVE REPLACEMENT FEMORAL APPROACH performed by Morenita Cramer MD at [address] [facility] Right 2019    PERITONEAL DIALYSIS CATHETER REMOVAL performed by Rosalia Fairchild MD at [address] [facility]      WN    CORONARY ANGIOPLASTY WITH STENT PLACEMENT  05/26/15    CYST REMOVAL  2013    EXCISION CYSTS, NECK X2 AND ABDOMINAL benign    DIAGNOSTIC CARDIAC CATH LAB PROCEDURE      DIALYSIS FISTULA CREATION Left 10/30/2017    LEFT BRACHIAL CEPHALIC FISTULA    DIALYSIS FISTULA CREATION Left 3/27/2019    LIGATION  AV FISTULA performed by Marcial Gaines MD at [id number] [place] [address], DIAGNOSTIC      IR TUNNELED CATHETER PLACEMENT GREATER THAN 5 YEARS  3/21/2022    IR TUNNELED CATHETER PLACEMENT GREATER THAN 5 YEARS 3/21/2022 [facility]    IR TUNNELED CATHETER PLACEMENT GREATER THAN 5 YEARS  2022    IR TUNNELED CATHETER PLACEMENT GREATER THAN 5 YEARS 2022 [facility]    IR TUNNELED CATHETER PLACEMENT GREATER THAN 5 YEARS  4/26/2022    IR TUNNELED CATHETER PLACEMENT GREATER THAN 5 YEARS 4/26/2022 [facility]    IR TUNNELED CATHETER PLACEMENT GREATER THAN 5 YEARS  6/23/2022    IR TUNNELED CATHETER PLACEMENT GREATER THAN 5 YEARS 6/23/2022 [facility]    OTHER SURGICAL HISTORY  02/01/2017    laparoscopic cholecystectomy with intraoperative cholangiogram    OTHER SURGICAL HISTORY  2018    PORT PLACEMENT  - vas cath    OTHER SURGICAL HISTORY Bilateral 06/26/2018    laprascopic peritoneal dialysis catheter placement    OTHER SURGICAL HISTORY Right 09/2018    peritoneal dialysis port placed on right side of abdomen    OTHER SURGICAL HISTORY  05/28/2019    PTA/Stenting R External Iliac artery    OR LAP INSERTION TUNNELED INTRAPERITONEAL CATHETER N/A 9/21/2018    LAPAROSCOPIC PERITONEAL DIALYSIS CATHETER REPLACEMENT performed by Pablo Calderón MD at [address] [facility] [address] 2/24/2022    PERINEAL ABCESS DRAINAGE performed by Pablo Calderón MD at [address] [place] [address] N/A 10/2/2022    THORACENTESIS ULTRASOUND performed by Svitlana Yousif MD at [address] [place] [address]  01/06/2016    UPPER GASTROINTESTINAL ENDOSCOPY  01/29/2017    possible candida, otherwise normal appearing    VASCULAR SURGERY  aprx 2 years ago    2 stents placed, each side of groin       Immunization History:   Immunization History   Administered Date(s) Administered    Hepatitis B Adult (Engerix-B) 11/18/2017, 06/13/2018, 07/13/2018    INFLUENZA, INTRADERMAL, QUADRIVALENT, PRESERVATIVE FREE 11/16/2016    Influenza Virus Vaccine 12/27/2012, 10/07/2014, 11/20/2015, 10/16/2019    Influenza, FLUARIX, FLULAVAL, FLUZONE (age 10 mo+) AND AFLURIA, (age 1 y+), PF, 0.5mL 10/16/2019    Influenza, FLUCELVAX, (age 10 mo+), MDCK, PF, 0.5mL 10/14/2017, 09/30/2020, 10/05/2021    Influenza, Quadv, 6 mo and older, IM, PF (Flulaval, Fluarix) 09/15/2018 PPD Test 11/09/2017, 11/16/2017, 01/03/2019, 01/06/2020, 01/15/2021    Pneumococcal Conjugate 13-valent ([id number]) 10/14/2017    Pneumococcal Polysaccharide ([id number]) 10/07/2014, 11/19/2019    Tdap (Boostrix, Adacel) 02/13/2020    Zoster Recombinant (Shingrix) 02/13/2020       Active Problems:  Patient Active Problem List   Diagnosis Code    Sepsis without acute organ dysfunction ([facility] A41.9    CHF (congestive heart failure) ([facility]) I50.9    Asthma-COPD overlap syndrome ([facility]) J44.9    CAD (coronary artery disease) I25.10    PVD (peripheral vascular disease) ([facility] I73.9    Bicuspid aortic valve Q23.1    Bilateral hilar adenopathy syndrome R59.0    Claudication in peripheral vascular disease ([facility]) I73.9    Uncontrolled hypertension I10    Diabetic neuropathy ([facility]) E11.40    Type 2 diabetes, uncontrolled, with neuropathy [id number]    Passive smoke exposure Z77.22    Depression with anxiety F41.8    PNA (pneumonia) J18.9    Obesity (BMI 30-39. 9) E66.9    ZAINAB on CPAP G47.33, Z99.89    Degeneration of lumbar or lumbosacral intervertebral disc M51.37    Lumbar radiculopathy M54.16    Lumbosacral spondylosis without myelopathy Z82.273    Biliary colic Z35.97    Symptomatic cholelithiasis K80.20    Gastroparesis due to DM ([facility]) E11.43, K31.84    Angina, class IV ([facility]) I20.9    Dyspnea R06.00    Dyslipidemia E78.5    Acute on chronic combined systolic and diastolic heart failure ([facility]) I50.43    Ischemic cardiomyopathy I25.5    Tobacco abuse Z72.0    CVA (cerebral vascular accident) ([facility] I63.9    History of CVA (cerebrovascular accident) Z86.73    Type 2 diabetes mellitus without complication, without long-term current use of insulin ([facility]) E11.9    ZAINAB (obstructive sleep apnea) G47.33    Pleural effusion on left J90    Chronic anemia D64.9    Nonrheumatic aortic valve stenosis I35.0    Mucus plugging of bronchi T17.500A    Hemodialysis-associated hypotension I95.3    ESRD (end stage renal disease) on dialysis ([facility]) N18.6, Z99.2    Hypotension due to drugs G06.9    Acute diastolic CHF (congestive heart failure) ([facility]) I50.31    Neuromuscular disorder ([facility]) G70.9    Renovascular hypertension I15.0    Mixed hyperlipidemia E78.2    Cigarette nicotine dependence in remission F17.211    Pulmonary edema J81.1    Fluid overload E87.70    Anemia of chronic disease D63.8    SOB (shortness of breath) on exertion R06.02    Steal syndrome of dialysis vascular access [facility]) T82.898A    Chronic, continuous use of opioids F11.90    Chronic bronchitis ([facility]) J42    Nasal congestion R09.81    Hypercholesteremia E78.00    Bradycardia R00.1    History of transcatheter aortic valve replacement (TAVR) Z95.2    Syncope and collapse R55    PAF (paroxysmal atrial fibrillation) ([facility]) I48.0    Bilateral leg weakness R29.898    GBS (Guillain-Hector syndrome) ([facility]) G61.0    Sinus pause I45.5    Weakness of both lower extremities R29.898    Sinus bradycardia R00.1    Ataxia R27.0    Peripheral vascular occlusive disease ([facility]) I73.9    Cellulitis of right foot L03.115    Iliac artery occlusion, right ([facility]) I74.5    Cellulitis and abscess of hand L03.119, L02.519    Type 2 diabetes mellitus with hyperglycemia ([facility]) E11.65    Acute encephalopathy G93.40    Acute hypoxemic respiratory failure ([facility]) J96.01    Acute CVA (cerebrovascular accident) ([facility] I63.9    Speech problem R47.9    Urinary tract infection with hematuria N39.0, R31.9    Respiratory failure with hypoxia ([facility] J96.91    Acute respiratory failure with hypercapnia ([facility]) J96.02    Acute pulmonary edema ([facility]) J81.0    Grade II diastolic dysfunction W94.99    Shock circulatory ([facility]) R57.9    Smoker F17.200    Normocytic normochromic anemia D64.9    NSTEMI (non-ST elevated myocardial infarction) ([facility]) I21.4    SIRS (systemic inflammatory response syndrome) ([facility]) R65.10    Atrial flutter ([facility]) I48.92    Liberty-rectal abscess K61.1    Somnolence R40.0    Pulmonary edema with diastolic CHF, NYHA class 3 ([facility]) I50.30 Respiratory failure ([facility] J96.90    Former smoker Z87.891    Cardiomyopathy ([facility] I42.9    Morbid obesity with BMI of 40.0-44.9, adult ([facility] E66.01, Z68.41    Hypoglycemia E16.2    Altered mental status R41.82    Hypertensive emergency I16.1    SOB (shortness of breath) R06.02    Acute respiratory failure with hypoxia and hypercapnia ([facility]) J96.01, J96.02    HFrEF (heart failure with reduced ejection fraction) ([facility]) I50.20    Pericardial effusion I31.39    Hypervolemia E87.70    Acute pneumonia J18.9       Isolation/Infection:   Isolation            No Isolation          Patient Infection Status       Infection Onset Added Last Indicated Last Indicated By Review Planned Expiration Resolved Resolved By    None active    Resolved    COVID-19 (Rule Out) 11/25/22 11/25/22 11/25/22 COVID-19 & Influenza Combo (Ordered)   11/25/22 Rule-Out Test Resulted    COVID-19 (Rule Out) 10/18/22 10/18/22 10/18/22 COVID-19, Rapid (Ordered)   10/18/22 Rule-Out Test Resulted    COVID-19 (Rule Out) 09/23/22 09/23/22 09/23/22 COVID-19, Rapid (Ordered)   09/23/22 Rule-Out Test Resulted    COVID-19 (Rule Out) 08/02/22 08/02/22 08/02/22 COVID-19, Rapid (Ordered)   08/02/22 Rule-Out Test Resulted    COVID-19 (Rule Out) 07/05/22 07/05/22 07/06/22 SARS-CoV-2 NAAT (Rapid) (Ordered)   07/06/22 Rule-Out Test Resulted    COVID-19 (Rule Out) 05/29/22 05/29/22 05/29/22 COVID-19, Rapid (Ordered)   05/29/22 Rule-Out Test Resulted    COVID-19 (Rule Out) 04/18/22 04/18/22 04/18/22 COVID-19, Rapid (Ordered)   04/18/22 Rule-Out Test Resulted    COVID-19 (Rule Out) 02/25/22 02/25/22 02/25/22 COVID-19, Rapid (Ordered)   02/25/22 Rule-Out Test Resulted    COVID-19 (Rule Out) 01/12/22 01/12/22 01/12/22 COVID-19, Rapid (Ordered)   01/12/22 Rule-Out Test Resulted    COVID-19 (Rule Out) 11/29/21 11/29/21 11/29/21 COVID-19, Rapid (Ordered)   11/30/21 Rule-Out Test Resulted    COVID-19 (Rule Out) 08/29/21 08/29/21 08/29/21 COVID-19 (Ordered)   08/29/21 Rule-Out Test Resulted    COVID-19 (Rule Out) 12/18/20 12/18/20 12/18/20 COVID-19 (Ordered)   12/18/20 Rule-Out Test Resulted    COVID-19 (Rule Out) 05/04/20 05/04/20 05/04/20 COVID-19 (Ordered)   05/04/20 Rule-Out Test Resulted            Nurse Assessment:  Last Vital Signs: /72   Pulse 63   Temp 98.3 °F (36.8 °C)   Resp 18   Ht 5' 9\" (1.753 m)   Wt 260 lb 2.3 oz (118 kg)   SpO2 98%   BMI 38.42 kg/m²     Last documented pain score (0-10 scale): Pain Level: 0  Last Weight:   Wt Readings from Last 1 Encounters:   11/28/22 260 lb 2.3 oz (118 kg)     Mental Status:  oriented and alert    IV Access:  - None    Nursing Mobility/ADLs:  Walking   Independent  Transfer  Independent  Bathing  Independent  Dressing  Independent  Toileting  Independent  Feeding  Independent  Med [address]  Independent  Med Delivery   whole    Wound Care Documentation and Therapy:  Wound 08/30/21 Toe (Comment  which one) Right Great Toe (Active)   Number of days: 814       Wound 01/12/22 Pedal Anterior;Right Healing scab from previous blister (per pt), flaky edges (Active)   Number of days: 320       Wound 01/12/22 Toe (Comment  which one) Anterior;Right Scab from old blister (per pt) on 4th toe, flaky edges (Active)   Number of days: 320       Incision 02/24/22 Perineum (Active)   Number of days: 276        Elimination:  Continence: Bowel: Yes  Bladder: Yes  Urinary Catheter: None   Colostomy/Ileostomy/Ileal Conduit: No       Date of Last BM: 11/27    Intake/Output Summary (Last 24 hours) at 11/28/2022 1415  Last data filed at 11/27/2022 2218  Gross per 24 hour   Intake 360 ml   Output 0 ml   Net 360 ml     I/O last 3 completed shifts:   In: 12 [P.O.:560]  Out: 0     Safety Concerns:     None    Impairments/Disabilities:      None    Nutrition Therapy:  Current Nutrition Therapy:   - Oral Diet:  General    Routes of Feeding: Oral  Liquids: No Restrictions  Daily Fluid Restriction: no  Last Modified Barium Swallow with Video (Video Swallowing Test): not done    Treatments at the Time of Hospital Discharge:   Respiratory Treatments: ***  Oxygen Therapy:  is on oxygen at 3 L/min per nasal cannula. Ventilator:    - BiPAP   IPAP: 12 cmH20, CPAP/EPAP: 5 cmH2O only when sleeping    Rehab Therapies: Physical Therapy and Occupational Therapy  Weight Bearing Status/Restrictions: No weight bearing restrictions  Other Medical Equipment (for information only, NOT a DME order):  ***  Other Treatments: ***    Patient's personal belongings (please select all that are sent with patient):  None    RN SIGNATURE:  Electronically signed by Kashif Martinez RN on 11/28/22 at 2:55 PM EST    CASE MANAGEMENT/SOCIAL WORK SECTION    Inpatient Status Date: ***    Readmission Risk Assessment Score:  Readmission Risk              Risk of Unplanned Readmission:  83           Discharging to Facility/ Agency   Name: [facility]  Address:  Phone:[phone]  Fax:    Dialysis Facility (if applicable)   Name:  Address:  Dialysis Schedule:  Phone:  Fax:    / signature: Electronically signed by NINO Kendall on 11/28/22 at 2:28 PM EST    PHYSICIAN SECTION    Prognosis: Good    Condition at Discharge: Stable    Rehab Potential (if transferring to Rehab): Good    Recommended Labs or Other Treatments After Discharge: None    Physician Certification: I certify the above information and transfer of Yousif Fonseca  is necessary for the continuing treatment of the diagnosis listed and that he requires Home Care for less 30 days.      Update Admission H&P: No change in H&P    PHYSICIAN SIGNATURE:  Electronically signed by Alejandra Pollock MD on 11/28/22 at 2:15 PM EST

## 2022-11-28 NOTE — CARE COORDINATION
CASE MANAGEMENT DISCHARGE SUMMARY      Discharge to: home with wife and [facility] for [facility]    Precertification completed: [facility] Exemption Notification (HENS) completed: NA    IMM given: 11/28/22     New Durable Medical Equipment ordered/agency: NA    Transportation:    Family/car: wife       Confirmed discharge plan with:     Patient: yes     Family:  yes, per patient     Facility/Agency, name:  [facility] to obtain from Epic        RN, name: Aquilino Scott    Note: Discharging nurse to complete [facility], reconcile [facility], and place final copy with patient's discharge packet. RN to ensure that written prescriptions for  Level II medications are sent with patient to the facility as per protocol.

## 2022-11-28 NOTE — PROGRESS NOTES
11/27/22 1867   NIV Type   Skin Assessment Clean, dry, & intact   Skin Protection for O2 Device No  (pt refused mepilex)   NIV Started/Stopped On   Equipment Type v60   Mode CPAP   Mask Type Full face mask   Mask Size Large   Settings/Measurements   PIP Observed 5 cm H20   CPAP/EPAP 5 cmH2O   Vt (Measured) 519 mL   Resp 17   FiO2  28 %   Minute Volume (L/min) 8.3 Liters   Mask Leak (lpm) 0 lpm   Comfort Level Good   Using Accessory Muscles No   SpO2 97   Patient's Home Machine No   Alarm Settings   Alarms On Y   Low Pressure (cmH2O) 6 cmH2O   High Pressure (cmH2O) 30 cmH2O   Apnea (secs) 20 secs   RR Low (bpm) 6   RR High (bpm) 40 br/min   Oxygen Therapy/Pulse Ox   Heart Rate 68

## 2022-11-28 NOTE — PROGRESS NOTES
[facility]  Nephrology f/u Note           Reason for Consult: ESRD  Requesting Physician:  Dr. Wyatt Chambers history  Seen during HD today  Far from TW of 101 kg        ROS: No chest pain/shortness of breath/fever/nausea/vomiting  PSFH: No visitor    Scheduled Meds:   epoetin siddharth-epbx  3,000 Units IntraVENous Once per day on Mon Wed Fri    apixaban  2.5 mg Oral BID    clopidogrel  75 mg Oral Daily    DULoxetine  30 mg Oral Daily    isosorbide dinitrate  20 mg Oral TID    metoprolol succinate  100 mg Oral BID    pantoprazole  40 mg Oral QAM AC    pravastatin  40 mg Oral Daily    QUEtiapine  50 mg Oral Nightly    sodium chloride flush  5-40 mL IntraVENous 2 times per day    cefTRIAXone (ROCEPHIN) IV  1,000 mg IntraVENous Daily    azithromycin  250 mg Oral Daily    insulin lispro  0-4 Units SubCUTAneous TID WC    insulin lispro  0-4 Units SubCUTAneous Nightly     Continuous Infusions:   sodium chloride      dextrose       PRN Meds:.heparin (porcine), cyclobenzaprine, nitroGLYCERIN, sodium chloride flush, sodium chloride, ondansetron **OR** ondansetron, polyethylene glycol, acetaminophen **OR** acetaminophen, glucose, dextrose bolus **OR** dextrose bolus, glucagon (rDNA), dextrose, hydrOXYzine HCl, ipratropium-albuterol    History of Present Illness on 11/26/2022:    45 y.o. yo male with PMH of ESRD, CHF on baseline home oxygen at 3 L who is admitted for increased shortness of breath productive cough and low-grade fevers  Patient became very short of breath and came to the emergency room. Yesterday he missed his dialysis at his outpatient unit, in the evening at hospital, he became very short of breath and needed BiPAP placement.   Urgent dialysis was done with 4.1 L net UF over 3 hours and post weight of 102.5 kg    Physical exam:   Constitutional:  VITALS:  /68   Pulse 68   Temp 98.7 °F (37.1 °C)   Resp 18   Ht 5' 9\" (1.753 m)   Wt 246 lb 14.6 oz (112 kg)   SpO2 98%   BMI 36.46 kg/m² Gen: alert, awake  Neck: No JVD  Skin: Unremarkable  Cardiovascular:  S1, S2 without m/r/g   Respiratory: CTA B without w/r/r; respiratory effort normal  Abdomen:  soft, nt, nd,   Extremities: Trace lower extremity edema  Neuro/Psy: AAoriented times 3 ; moves all 4 ext    Data/  Recent Labs     11/25/22  0950 11/26/22  0434 11/27/22  0447   WBC 11.0 9.8 9.4   HGB 9.4* 9.2* 9.3*   HCT 28.8* 28.4* 29.8*   MCV 89.5 90.4 91.6    362 389       Recent Labs     11/25/22  0950 11/26/22  0434 11/27/22  0447   * 134* 136   K 5.8* 4.9 4.7   CL 93* 93* 94*   CO2 24 26 25   GLUCOSE 203* 134* 119*   PHOS  --  4.4 5.8*   MG 1.90 1.90 1.90   BUN 71* 51* 53*   CREATININE 8.5* 6.3* 6.1*   LABGLOM 7* 10* 10*         Assessment  -ESRD on HD Monday Wednesday Friday  -Hyperkalemia  -Acute hypoxic respiratory failure status post   Emergent HD on 11/25/2022 with 4.1 kg of net UF  -Anemia  -CKD/MBD  -Pneumonia on antibiotics per primary team    Plan  -HD per Monday Wednesday Friday from next week   target weight 101 kg  -EPO 3000 units with dialysis  -Renal dose medications      Leandro Fisher MD  Office: [phone]  Fax:    [phone]  [employer]

## 2022-11-28 NOTE — PROGRESS NOTES
11/28/22 0145   NIV Type   $NIV $Daily Charge   Equipment Type v60   Mode CPAP   Mask Type Full face mask   Mask Size Large   Settings/Measurements   PIP Observed 6 cm H20   CPAP/EPAP 5 cmH2O   Vt (Measured) 430 mL   Resp 18   FiO2  28 %   Minute Volume (L/min) 7.8 Liters   Mask Leak (lpm) 0 lpm   Comfort Level Good   Using Accessory Muscles No   SpO2 96   Patient's Home Machine No   Alarm Settings   Alarms On Y   Low Pressure (cmH2O) 4 cmH2O   High Pressure (cmH2O) 25 cmH2O   Apnea (secs) 20 secs   RR Low (bpm) 6   RR High (bpm) 40 br/min

## 2022-11-28 NOTE — PROGRESS NOTES
Hospitalist Progress Note      PCP: Carlin Thibodeaux MD    Date of Admission: 11/25/2022    Chief Complaint: SOB    Subjective: no new c/o. Medications:  Reviewed    Infusion Medications    sodium chloride      dextrose       Scheduled Medications    epoetin siddharth-epbx  3,000 Units IntraVENous Once per day on Mon Wed Fri    apixaban  2.5 mg Oral BID    clopidogrel  75 mg Oral Daily    DULoxetine  30 mg Oral Daily    isosorbide dinitrate  20 mg Oral TID    metoprolol succinate  100 mg Oral BID    pantoprazole  40 mg Oral QAM AC    pravastatin  40 mg Oral Daily    QUEtiapine  50 mg Oral Nightly    sodium chloride flush  5-40 mL IntraVENous 2 times per day    cefTRIAXone (ROCEPHIN) IV  1,000 mg IntraVENous Daily    azithromycin  250 mg Oral Daily    insulin lispro  0-4 Units SubCUTAneous TID WC    insulin lispro  0-4 Units SubCUTAneous Nightly     PRN Meds: heparin (porcine), cyclobenzaprine, nitroGLYCERIN, sodium chloride flush, sodium chloride, ondansetron **OR** ondansetron, polyethylene glycol, acetaminophen **OR** acetaminophen, glucose, dextrose bolus **OR** dextrose bolus, glucagon (rDNA), dextrose, hydrOXYzine HCl, ipratropium-albuterol      Intake/Output Summary (Last 24 hours) at 11/28/2022 1410  Last data filed at 11/27/2022 2218  Gross per 24 hour   Intake 360 ml   Output 0 ml   Net 360 ml       Physical Exam Performed:    /72   Pulse 63   Temp 98.3 °F (36.8 °C)   Resp 18   Ht 5' 9\" (1.753 m)   Wt 260 lb 2.3 oz (118 kg)   SpO2 98%   BMI 38.42 kg/m²     General appearance: No apparent distress, appears stated age and cooperative. HEENT: Pupils equal, round, and reactive to light. Conjunctivae/corneas clear. Neck: Supple, with full range of motion. No jugular venous distention. Trachea midline. Respiratory:  Normal respiratory effort. Clear to auscultation, bilaterally without Rales/Wheezes/Rhonchi.   Cardiovascular: Regular rate and rhythm with normal S1/S2 without murmurs, rubs or gallops. Abdomen: Soft, non-tender, non-distended with normal bowel sounds. Musculoskeletal: No clubbing, cyanosis or edema bilaterally. Full range of motion without deformity. Skin: Skin color, texture, turgor normal.  No rashes or lesions. Neurologic:  Neurovascularly intact without any focal sensory/motor deficits. Cranial nerves: II-XII intact, grossly non-focal.  Psychiatric: Alert and oriented, thought content appropriate, normal insight  Capillary Refill: Brisk,< 3 seconds   Peripheral Pulses: +2 palpable, equal bilaterally       Labs:   Recent Labs     11/26/22 0434 11/27/22 0447 11/28/22  0938   WBC 9.8 9.4 8.5   HGB 9.2* 9.3* 9.1*   HCT 28.4* 29.8* 28.4*    389 387     Recent Labs     11/26/22 0434 11/27/22 0447 11/28/22  0938   * 136 131*   K 4.9 4.7 5.1   CL 93* 94* 91*   CO2 26 25 23   BUN 51* 53* 78*   CREATININE 6.3* 6.1* 8.0*   CALCIUM 8.2* 8.2* 7.5*   PHOS 4.4 5.8* 7.3*     No results for input(s): AST, ALT, BILIDIR, BILITOT, ALKPHOS in the last 72 hours. No results for input(s): INR in the last 72 hours. No results for input(s): D[place]ne Knock in the last 72 hours. Urinalysis:      Lab Results   Component Value Date/Time    NITRU Negative 05/29/2022 12:51 PM    WBCUA 10-20 05/29/2022 12:51 PM    BACTERIA 3+ 05/29/2022 12:51 PM    RBCUA 5-10 05/29/2022 12:51 PM    BLOODU TRACE-INTACT 05/29/2022 12:51 PM    SPECGRAV 1.015 05/29/2022 12:51 PM    GLUCOSEU 100 05/29/2022 12:51 PM       Consults:    IP CONSULT TO NEPHROLOGY  IP CONSULT TO HOME CARE NEEDS      Assessment/Plan:    Active Hospital Problems    Diagnosis     Acute pneumonia [J18.9]      Priority: Medium          PNA - likely CAP w/ Strep Pneumonia. Started on empiric Rocephin/Azithro on 25 Nov.  Changed to DAILY dosing. ESRD - on HD M/W/F. Nephrology consulted and appreciated.  Last HD planned Monday 28 Nov.     Troponin elevation - of unclear clinical significance w/ etiology likely 2nd to ESRD, w/out signs/sxs of active ischemia. Follow serial troponins. Reviewed and documented as above. Anemia - likely 2nd to CKD, w/out evidence of active bleeding/hemolysis. Stable and asymptomatic w/out indication for transfusion. Follow serial labs. Reviewed and documented as above. Obesity -  With Body mass index is 37.11 kg/m². Complicating assessment and treatment. Placing patient at risk for multiple co-morbidities as well as early death and contributing to the patient's presentation. Counseled on weight loss. DVT Prophylaxis: Eliquis       Recent Labs     11/26/22  0434 11/27/22  0447 11/28/22  0938    389 387     Diet: ADULT DIET; Regular; Low Sodium (2 gm);  Low Potassium (Less than 3000 mg/day); 1200 ml  Code Status: Full Code      PT/OT Eval Status: not yet ordered     Dispo - Patient is likely to remain in-house at least until Monday/Tues 28/29 Nov pending clinical course and subspecialty Yuliya Patel MD

## 2022-11-28 NOTE — PROGRESS NOTES
11/28/22 0346   NIV Type   Equipment Type v60   Mode CPAP   Mask Type Full face mask   Mask Size Large   Settings/Measurements   PIP Observed 6 cm H20   CPAP/EPAP 5 cmH2O   Vt (Measured) 427 mL   Resp 17   FiO2  28 %   Minute Volume (L/min) 7.5 Liters   Mask Leak (lpm) 0 lpm   Comfort Level Good   Using Accessory Muscles No   Patient's Home Machine No   Alarm Settings   Alarms On Y   Low Pressure (cmH2O) 4 cmH2O   High Pressure (cmH2O) 25 cmH2O   Apnea (secs) 20 secs   RR Low (bpm) 6   RR High (bpm) 40 br/min

## 2022-11-29 ENCOUNTER — CARE COORDINATION (OUTPATIENT)
Dept: CASE MANAGEMENT | Age: 52
End: 2022-11-29

## 2022-11-29 LAB
BLOOD CULTURE, ROUTINE: NORMAL
CULTURE, BLOOD 2: NORMAL

## 2022-11-29 RX ORDER — TRAZODONE HYDROCHLORIDE 150 MG/1
TABLET ORAL
Qty: 30 TABLET | Refills: 10 | OUTPATIENT
Start: 2022-11-29

## 2022-11-29 NOTE — DISCHARGE SUMMARY
Hospital Medicine Discharge Summary    Patient ID: Wu Reyes      Patient's PCP: Ahsan Moseley MD    Admit Date: 11/25/2022     Discharge Date: 11/28/2022      Admitting Physician: Dane Mark MD     Discharge Physician: Dane Mark MD     Discharge Diagnoses: Active Hospital Problems    Diagnosis     Acute pneumonia [J18.9]      Priority: Medium       The patient was seen and examined on day of discharge and this discharge summary is in conjunction with any daily progress note from day of discharge. Hospital Course:        PNA - likely CAP w/ Strep Pneumonia. Started on empiric Rocephin/Azithro on 25 Nov.  PO Azithro at discharge     ESRD - on HD M/W/F. Nephrology consulted and appreciated. Last HD Monday 28 Nov.     Troponin elevation - of unclear clinical significance w/ etiology likely 2nd to ESRD, w/out signs/sxs of active ischemia. Followe serial troponins. Anemia - likely 2nd to CKD, w/out evidence of active bleeding/hemolysis. Stable and asymptomatic w/out indication for transfusion. Obesity -  With Body mass index is 37.11 kg/m². Complicating assessment and treatment. Placing patient at risk for multiple co-morbidities as well as early death and contributing to the patient's presentation. Counseled on weight loss. Labs:  For convenience and continuity at follow-up the following most recent labs are provided:      CBC:    Lab Results   Component Value Date/Time    WBC 8.5 11/28/2022 09:38 AM    HGB 9.1 11/28/2022 09:38 AM    HCT 28.4 11/28/2022 09:38 AM     11/28/2022 09:38 AM       Renal:    Lab Results   Component Value Date/Time     11/28/2022 09:38 AM    K 5.1 11/28/2022 09:38 AM    K 5.8 11/25/2022 09:50 AM    CL 91 11/28/2022 09:38 AM    CO2 23 11/28/2022 09:38 AM    BUN 78 11/28/2022 09:38 AM    CREATININE 8.0 11/28/2022 09:38 AM    CALCIUM 7.5 11/28/2022 09:38 AM    PHOS 7.3 11/28/2022 09:38 AM         Significant Diagnostic Studies    Radiology:   XR CHEST PORTABLE   Final Result   Moderate left pleural effusion with left basilar airspace disease. This   could represent atelectasis or pneumonia in the appropriate clinical setting. Consults:     IP CONSULT TO NEPHROLOGY    Disposition: Home     Condition at Discharge: Stable    Discharge Instructions/Follow-up:  w/ PCP 1-2 weeks and subspecialists as arranged. Code Status:  Full Code    Activity: activity as tolerated    Diet: regular diet      Discharge Medications:     Discharge Medication List as of 11/28/2022  3:12 PM             Details   azithromycin (ZITHROMAX) 250 MG tablet Take 1 tablet by mouth daily for 4 doses, Disp-4 tablet, R-0Print                Details   apixaban (ELIQUIS) 2.5 MG TABS tablet Take 1 tablet by mouth 2 times daily, Disp-60 tablet, R-0NO PRINT      gabapentin (NEURONTIN) 100 MG capsule Take 2 capsules by mouth 3 times daily as needed (neuropathic pain) for up to 10 days. , Disp-60 capsule, R-0Print      metoprolol succinate (TOPROL XL) 100 MG extended release tablet Take 1 tablet by mouth in the morning and at bedtime, Disp-30 tablet, R-3DC to SNF      cyclobenzaprine (FLEXERIL) 5 MG tablet Muscle spasmsDC to SNF      QUEtiapine (SEROQUEL) 50 MG tablet TAKE 1 TABLET BY MOUTH EVERY EVENING, Disp-30 tablet, R-10Normal      isosorbide dinitrate (ISORDIL) 20 MG tablet Take 1 tablet by mouth 3 times daily, Disp-90 tablet, R-3Normal      clopidogrel (PLAVIX) 75 MG tablet Take 1 tablet by mouth daily, Disp-90 tablet, R-3Normal      pantoprazole (PROTONIX) 40 MG tablet TAKE 1 TABLET BY MOUTH EVERY MORNING BEFORE BREAKFAST, Disp-30 tablet, R-10Normal      docusate sodium (DOK) 100 MG capsule Take 1 capsule by mouth daily, Disp-30 capsule, R-5Normal      DULoxetine (CYMBALTA) 30 MG extended release capsule TAKE 1 CAPSULE BY MOUTH EVERY DAY, Disp-30 capsule, R-10Normal      sennosides-docusate sodium (SENOKOT-S) 8.6-50 MG tablet Take 1 tablet by mouth daily, Disp-10 tablet, R-0Normal      pravastatin (PRAVACHOL) 40 MG tablet Take 1 tablet by mouth daily, Disp-90 tablet, R-3Normal      B Complex-C-Folic Acid (VIRT-CAPS) 1 MG CAPS TAKE 1 CAPSULE BY MOUTH EVERY DAY, Disp-90 capsule, R-1Normal      Calcium Acetate, Phos Binder, 667 MG CAPS TAKE 1 CAPSULE BY MOUTH THREE TIMES DAILY WITH MEALS, Disp-90 capsule, R-3Normal      nitroGLYCERIN (NITROSTAT) 0.4 MG SL tablet DISSOLVE 1 TABLET UNDER THE TONGUE AS NEEDED FOR CHEST PAIN EVERY 5 MINUTES UP TO 3 TIMES. IF NO RELIEF CALL 911., Disp-25 tablet, R-10Normal      vitamin D (ERGOCALCIFEROL) 94484 units CAPS capsule TK 1 C PO WEEKLY, R-11Historical Med      Alcohol Swabs PADS Disp-300 each, R-3, NormalUSE AS DIRECTED      ipratropium-albuterol (DUONEB) 0.5-2.5 (3) MG/3ML SOLN nebulizer solution Inhale 3 mLs into the lungs every 6 hours as needed for Shortness of Breath, Disp-360 mL, R-1Print      calcium carbonate (TUMS) 500 MG chewable tablet Take 1 tablet by mouth 3 times daily as needed for Heartburn. Historical Med             Time Spent on discharge is more than 40 minutes in the examination, evaluation, counseling and review of medications and discharge plan. Signed:    Lois Velez MD   11/29/2022      Thank you Lexi Ashraf MD for the opportunity to be involved in this patient's care. If you have any questions or concerns please feel free to contact me at [phone].

## 2022-11-30 ENCOUNTER — CARE COORDINATION (OUTPATIENT)
Dept: CASE MANAGEMENT | Age: 52
End: 2022-11-30

## 2022-11-30 RX ORDER — CITALOPRAM 40 MG/1
TABLET ORAL
Qty: 30 TABLET | Refills: 10 | OUTPATIENT
Start: 2022-11-30

## 2022-11-30 NOTE — CARE COORDINATION
[facility] Care Transitions Initial Follow Up Call    Call within 2 business days of discharge: Yes    Patient: Domenico Evans Patient : 1970   MRN: [id number]  Reason for Admission: ARF/PNA  Discharge Date: 22 RARS: Readmission Risk Score: 44.3      Last Discharge  Street       Date Complaint Diagnosis Description Type Department Provider    22 Shortness of Breath Acute on chronic respiratory failure, unspecified whether with hypoxia or hypercapnia ([facility] [address] . .. ED to Hosp-Admission (Discharged) (ADMITTED) Jae Mauricio MD; Maldonado Lopez. .. Challenges to be reviewed by the provider   Additional needs identified to be addressed with provider: Yes  CTN was unsuccessful in reaching patient for post hospitalization transition support. Episode ended               Method of communication with provider: chart routing. Spoke to Tammi at [facility] and referral was received. Notes reviewed and Lashae stated that nurse attempted to reach patient yesterday to get [facility] visit scheduled, but was unsuccessful in reaching patient. Lashae stated that they have not received a call back from patient as of today. Second and final attempt made to reach patient for initial post hospital transition call. VM left stating purpose of call along with my contact information requesting a return call.       Care Transitions 24 Hour Call    Do you have all of your prescriptions and are they filled?: Yes  Do you have support at home?: Partner/Spouse/SO  Are you an active caregiver in your home?: No  Care Transitions Interventions         Follow Up  Future Appointments   Date Time Provider Nelly Darby   2022 11:30 AM JAY Monae - CNP Lupe Pollock [facility] Ane Boast, RN

## 2022-12-01 RX ORDER — CARVEDILOL 12.5 MG/1
TABLET ORAL
Qty: 60 TABLET | Refills: 10 | OUTPATIENT
Start: 2022-12-01

## 2022-12-02 ENCOUNTER — APPOINTMENT (OUTPATIENT)
Dept: GENERAL RADIOLOGY | Age: 52
DRG: 280 | End: 2022-12-02
Payer: MEDICARE

## 2022-12-02 ENCOUNTER — HOSPITAL ENCOUNTER (EMERGENCY)
Age: 52
Discharge: HOME | DRG: 280 | End: 2022-12-03
Attending: EMERGENCY MEDICINE
Payer: MEDICARE

## 2022-12-02 ENCOUNTER — TELEPHONE (OUTPATIENT)
Dept: OTHER | Facility: CLINIC | Age: 52
End: 2022-12-02

## 2022-12-02 DIAGNOSIS — N18.6 ESRD (END STAGE RENAL DISEASE) (HCC): ICD-10-CM

## 2022-12-02 DIAGNOSIS — I50.9 ACUTE ON CHRONIC CONGESTIVE HEART FAILURE, UNSPECIFIED HEART FAILURE TYPE (HCC): Primary | ICD-10-CM

## 2022-12-02 PROCEDURE — 96374 THER/PROPH/DIAG INJ IV PUSH: CPT

## 2022-12-02 PROCEDURE — 99285 EMERGENCY DEPT VISIT HI MDM: CPT

## 2022-12-02 PROCEDURE — 84484 ASSAY OF TROPONIN QUANT: CPT

## 2022-12-02 PROCEDURE — 80053 COMPREHEN METABOLIC PANEL: CPT

## 2022-12-02 PROCEDURE — 83880 ASSAY OF NATRIURETIC PEPTIDE: CPT

## 2022-12-02 PROCEDURE — 85025 COMPLETE CBC W/AUTO DIFF WBC: CPT

## 2022-12-02 PROCEDURE — 93005 ELECTROCARDIOGRAM TRACING: CPT | Performed by: EMERGENCY MEDICINE

## 2022-12-02 PROCEDURE — 71045 X-RAY EXAM CHEST 1 VIEW: CPT

## 2022-12-02 PROCEDURE — 82803 BLOOD GASES ANY COMBINATION: CPT

## 2022-12-02 ASSESSMENT — PAIN SCALES - GENERAL: PAINLEVEL_OUTOF10: 8

## 2022-12-02 ASSESSMENT — ENCOUNTER SYMPTOMS
ABDOMINAL PAIN: 0
SHORTNESS OF BREATH: 1
COUGH: 1
DIARRHEA: 1
EYE PAIN: 0
WHEEZING: 1

## 2022-12-02 ASSESSMENT — PAIN - FUNCTIONAL ASSESSMENT: PAIN_FUNCTIONAL_ASSESSMENT: 0-10

## 2022-12-02 ASSESSMENT — PAIN DESCRIPTION - LOCATION: LOCATION: BACK

## 2022-12-02 ASSESSMENT — PAIN DESCRIPTION - PAIN TYPE: TYPE: CHRONIC PAIN

## 2022-12-03 VITALS
SYSTOLIC BLOOD PRESSURE: 156 MMHG | HEART RATE: 91 BPM | OXYGEN SATURATION: 100 % | HEIGHT: 69 IN | BODY MASS INDEX: 32.58 KG/M2 | DIASTOLIC BLOOD PRESSURE: 75 MMHG | WEIGHT: 220 LBS | TEMPERATURE: 97 F | RESPIRATION RATE: 16 BRPM

## 2022-12-03 LAB
A/G RATIO: 1.3 (ref 1.1–2.2)
ALBUMIN SERPL-MCNC: 3.9 G/DL (ref 3.4–5)
ALP BLD-CCNC: 63 U/L (ref 40–129)
ALT SERPL-CCNC: <5 U/L (ref 10–40)
ANION GAP SERPL CALCULATED.3IONS-SCNC: 12 MMOL/L (ref 3–16)
AST SERPL-CCNC: 12 U/L (ref 15–37)
BASE EXCESS VENOUS: 3.1 MMOL/L (ref -3–3)
BASOPHILS ABSOLUTE: 0.1 K/UL (ref 0–0.2)
BASOPHILS RELATIVE PERCENT: 0.7 %
BILIRUB SERPL-MCNC: <0.2 MG/DL (ref 0–1)
BUN BLDV-MCNC: 25 MG/DL (ref 7–20)
CALCIUM SERPL-MCNC: 9 MG/DL (ref 8.3–10.6)
CARBOXYHEMOGLOBIN: 2.1 % (ref 0–1.5)
CHLORIDE BLD-SCNC: 102 MMOL/L (ref 99–110)
CO2: 27 MMOL/L (ref 21–32)
CREAT SERPL-MCNC: 4 MG/DL (ref 0.9–1.3)
EKG ATRIAL RATE: 109 BPM
EKG DIAGNOSIS: NORMAL
EKG P AXIS: 88 DEGREES
EKG P-R INTERVAL: 164 MS
EKG Q-T INTERVAL: 346 MS
EKG QRS DURATION: 100 MS
EKG QTC CALCULATION (BAZETT): 465 MS
EKG R AXIS: 17 DEGREES
EKG T AXIS: 47 DEGREES
EKG VENTRICULAR RATE: 109 BPM
EOSINOPHILS ABSOLUTE: 0 K/UL (ref 0–0.6)
EOSINOPHILS RELATIVE PERCENT: 0.2 %
GFR SERPL CREATININE-BSD FRML MDRD: 17 ML/MIN/{1.73_M2}
GLUCOSE BLD-MCNC: 211 MG/DL (ref 70–99)
HCO3 VENOUS: 27.7 MMOL/L (ref 23–29)
HCT VFR BLD CALC: 26.7 % (ref 40.5–52.5)
HEMOGLOBIN: 9 G/DL (ref 13.5–17.5)
INFLUENZA A: NOT DETECTED
INFLUENZA B: NOT DETECTED
LYMPHOCYTES ABSOLUTE: 0.5 K/UL (ref 1–5.1)
LYMPHOCYTES RELATIVE PERCENT: 4.2 %
MCH RBC QN AUTO: 30.2 PG (ref 26–34)
MCHC RBC AUTO-ENTMCNC: 33.7 G/DL (ref 31–36)
MCV RBC AUTO: 89.5 FL (ref 80–100)
METHEMOGLOBIN VENOUS: 0.3 %
MONOCYTES ABSOLUTE: 0.6 K/UL (ref 0–1.3)
MONOCYTES RELATIVE PERCENT: 5 %
NEUTROPHILS ABSOLUTE: 9.8 K/UL (ref 1.7–7.7)
NEUTROPHILS RELATIVE PERCENT: 89.9 %
O2 SAT, VEN: 96 %
O2 THERAPY: ABNORMAL
PCO2, VEN: 42.8 MMHG (ref 40–50)
PDW BLD-RTO: 16.3 % (ref 12.4–15.4)
PH VENOUS: 7.43 (ref 7.35–7.45)
PLATELET # BLD: 374 K/UL (ref 135–450)
PMV BLD AUTO: 7.2 FL (ref 5–10.5)
PO2, VEN: 87.2 MMHG (ref 25–40)
POTASSIUM REFLEX MAGNESIUM: 3.8 MMOL/L (ref 3.5–5.1)
PRO-BNP: ABNORMAL PG/ML (ref 0–124)
RBC # BLD: 2.99 M/UL (ref 4.2–5.9)
SARS-COV-2 RNA, RT PCR: NOT DETECTED
SODIUM BLD-SCNC: 141 MMOL/L (ref 136–145)
SPECIMEN STATUS: NORMAL
TCO2 CALC VENOUS: 29 MMOL/L
TOTAL PROTEIN: 7 G/DL (ref 6.4–8.2)
TROPONIN: 0.13 NG/ML
WBC # BLD: 10.9 K/UL (ref 4–11)

## 2022-12-03 PROCEDURE — 87636 SARSCOV2 & INF A&B AMP PRB: CPT

## 2022-12-03 PROCEDURE — 94640 AIRWAY INHALATION TREATMENT: CPT

## 2022-12-03 PROCEDURE — 93010 ELECTROCARDIOGRAM REPORT: CPT | Performed by: INTERNAL MEDICINE

## 2022-12-03 PROCEDURE — 6370000000 HC RX 637 (ALT 250 FOR IP): Performed by: EMERGENCY MEDICINE

## 2022-12-03 PROCEDURE — 6360000002 HC RX W HCPCS: Performed by: EMERGENCY MEDICINE

## 2022-12-03 PROCEDURE — 2700000000 HC OXYGEN THERAPY PER DAY

## 2022-12-03 PROCEDURE — 94761 N-INVAS EAR/PLS OXIMETRY MLT: CPT

## 2022-12-03 RX ORDER — IPRATROPIUM BROMIDE AND ALBUTEROL SULFATE 2.5; .5 MG/3ML; MG/3ML
1 SOLUTION RESPIRATORY (INHALATION) ONCE
Status: COMPLETED | OUTPATIENT
Start: 2022-12-03 | End: 2022-12-03

## 2022-12-03 RX ORDER — FUROSEMIDE 10 MG/ML
60 INJECTION INTRAMUSCULAR; INTRAVENOUS ONCE
Status: COMPLETED | OUTPATIENT
Start: 2022-12-03 | End: 2022-12-03

## 2022-12-03 RX ORDER — METOPROLOL SUCCINATE 50 MG/1
100 TABLET, EXTENDED RELEASE ORAL DAILY
Status: DISCONTINUED | OUTPATIENT
Start: 2022-12-03 | End: 2022-12-03 | Stop reason: HOSPADM

## 2022-12-03 RX ADMIN — FUROSEMIDE 60 MG: 10 INJECTION, SOLUTION INTRAMUSCULAR; INTRAVENOUS at 05:48

## 2022-12-03 RX ADMIN — IPRATROPIUM BROMIDE AND ALBUTEROL SULFATE 1 AMPULE: 2.5; .5 SOLUTION RESPIRATORY (INHALATION) at 04:01

## 2022-12-03 NOTE — ED PROVIDER NOTES
Emergency Department Provider Note     Location: [address] [place] [address]  ED  12/2/2022    I independently performed a history and physical on Yariel Diaz. All diagnostic, treatment, and disposition decisions were made by myself in conjunction with resident physician, Dr. Malcolm Argueta. I have discussed with the resident the management of this patient. Briefly, this is a 45 y.o. male here for SOB. Patient has a history of COPD, ESRD on HD, CHF. He was recently admitted from 11/25 to 11/28 for PNA. He was discharged home with abx and he finished his last dose today. Patient reports multiple episodes of diarrhea today. Denies significant abdominal pain. No bloody stool. Patient reports increased shortness of breath this evening. He has orthopnea. Last dialysis was this morning and he states dialysis center was able to \"pull off quite a bit of fluid\". He tried his inhaler and nebulizer at home without relief. ED Triage Vitals [12/02/22 2102]   BP Temp Temp Source Heart Rate Resp SpO2 Height Weight   (!) 197/98 99 °F (37.2 °C) Oral (!) 108 16 100 % 5' 9\" (1.753 m) 220 lb (99.8 kg)        Exam showed well-developed well-nourished male, awake alert, diffuse rhonchi and wheezing bilaterally. Tachycardic. Abdomen soft nondistended nontender. Bilateral lower extremity edema but patient states that is baseline.     MDM/ED Course  ED Medication Orders (From admission, onward)      Start Ordered     Status Ordering Provider    12/03/22 0545 12/03/22 0530  furosemide (LASIX) injection 60 mg  ONCE         Last MAR action: Given - by Keenan Lutz on 12/03/22 at [address] [facility], [facility]    12/03/22 0330 12/03/22 0318  ipratropium-albuterol (DUONEB) nebulizer solution 1 ampule  ONCE        Question:  Initiate RT Bronchodilator Protocol  Answer:  Yes - ED protocol    Last MAR action: Given - by Marine Izaguirre on 12/03/22 at [facility] [facility]            EKG  The Ekg interpreted by me in the absence of a cardiologist shows.  sinus tachycardia, yuct=796    Axis is   Normal  QTc is  normal  Intervals and Durations are unremarkable. No specific ST-T wave changes appreciated. No evidence of acute ischemia. Compared to prior EKG dated November 23, 2022, patient is tachycardic today. QT prolongation resolved. Otherwise no significant changes. Radiology  XR CHEST PORTABLE    Result Date: 12/2/2022  EXAMINATION: ONE XRAY VIEW OF THE CHEST 12/2/2022 9:29 pm COMPARISON: Chest 11/25/2022 HISTORY: ORDERING SYSTEM PROVIDED HISTORY: shortness of breath FINDINGS: The cardiac silhouette is enlarged. Sequela from TAVR. Calcifications involving the aorta reflect atherosclerosis. The mediastinal and hilar silhouettes appear unremarkable. Moderate size left pleural effusion with left basilar consolidation obscuring left hemidiaphragm and left heart margin bowel. Vascular engorgement and cephalization is demonstrated with bilateral peribronchial cuffing and perivascular haziness. No focal consolidation or pleural effusion evident on the right. Unchanged left IJ hemodialysis catheter, tip at the superior cavoatrial junction level. No pneumothorax is seen. No acute osseous abnormality is identified. 1.  Congestive heart failure is most likely given the radiographic findings; pneumonia is also a consideration in areas of consolidation with pleural effusion. Similar appearance to prior study. 2. Calcific atherosclerosis aorta. 3. Cardiomegaly.         Labs  Results for orders placed or performed during the hospital encounter of 12/02/22   COVID-19 & Influenza Combo    Specimen: Nasopharyngeal Swab   Result Value Ref Range    SARS-CoV-2 RNA, RT PCR NOT DETECTED NOT DETECTED    INFLUENZA A NOT DETECTED NOT DETECTED    INFLUENZA B NOT DETECTED NOT DETECTED   Brain Natriuretic Peptide   Result Value Ref Range    Pro-BNP >70,000 (H) 0 - 124 pg/mL   Troponin   Result Value Ref Range    Troponin 0.13 (H) <0.01 ng/mL   CMP w/ Reflex to MG   Result Value Ref Range    Sodium 141 136 - 145 mmol/L    Potassium reflex Magnesium 3.8 3.5 - 5.1 mmol/L    Chloride 102 99 - 110 mmol/L    CO2 27 21 - 32 mmol/L    Anion Gap 12 3 - 16    Glucose 211 (H) 70 - 99 mg/dL    BUN 25 (H) 7 - 20 mg/dL    Creatinine 4.0 (H) 0.9 - 1.3 mg/dL    Est, Glom Filt Rate 17 (A) >60    Calcium 9.0 8.3 - 10.6 mg/dL    Total Protein 7.0 6.4 - 8.2 g/dL    Albumin 3.9 3.4 - 5.0 g/dL    Albumin/Globulin Ratio 1.3 1.1 - 2.2    Total Bilirubin <0.2 0.0 - 1.0 mg/dL    Alkaline Phosphatase 63 40 - 129 U/L    ALT <5 (L) 10 - 40 U/L    AST 12 (L) 15 - 37 U/L   CBC with Auto Differential   Result Value Ref Range    WBC 10.9 4.0 - 11.0 K/uL    RBC 2.99 (L) 4.20 - 5.90 M/uL    Hemoglobin 9.0 (L) 13.5 - 17.5 g/dL    Hematocrit 26.7 (L) 40.5 - 52.5 %    MCV 89.5 80.0 - 100.0 fL    MCH 30.2 26.0 - 34.0 pg    MCHC 33.7 31.0 - 36.0 g/dL    RDW 16.3 (H) 12.4 - 15.4 %    Platelets 913 194 - 270 K/uL    MPV 7.2 5.0 - 10.5 fL    Neutrophils % 89.9 %    Lymphocytes % 4.2 %    Monocytes % 5.0 %    Eosinophils % 0.2 %    Basophils % 0.7 %    Neutrophils Absolute 9.8 (H) 1.7 - 7.7 K/uL    Lymphocytes Absolute 0.5 (L) 1.0 - 5.1 K/uL    Monocytes Absolute 0.6 0.0 - 1.3 K/uL    Eosinophils Absolute 0.0 0.0 - 0.6 K/uL    Basophils Absolute 0.1 0.0 - 0.2 K/uL   Blood gas, venous   Result Value Ref Range    pH, Blade 7.429 7.350 - 7.450    pCO2, Blade 42.8 40.0 - 50.0 mmHg    pO2, Blade 87.2 (H) 25.0 - 40.0 mmHg    HCO3, Venous 27.7 23.0 - 29.0 mmol/L    Base Excess, Blade 3.1 (H) -3.0 - 3.0 mmol/L    O2 Sat, Blade 96 Not Established %    Carboxyhemoglobin 2.1 (H) 0.0 - 1.5 %    MetHgb, Blade 0.3 <1.5 %    TC02 (Calc), Blade 29 Not Established mmol/L    O2 Therapy Unknown    Sample possible blood bank testing   Result Value Ref Range    Specimen Status KIMBERLY    EKG 12 Lead   Result Value Ref Range    Ventricular Rate 109 BPM    Atrial Rate 109 BPM    P-R Interval 164 ms    QRS Duration 100 ms    Q-T Interval 346 ms    QTc Calculation (Bazett) 465 ms    P Axis 88 degrees    R Axis 17 degrees    T Axis 47 degrees    Diagnosis       Sinus tachycardiaConfirmed by IVORY Pacheco MD ([id number]) on 12/3/2022 9:17:18 AM     Patient's ED course was delayed due to system downtime. However we used the opportunity to observe the patient for several hours in the ED. Patient never dropped his O2 sat. We also ambulated the patient and he maintained his O2 sat with ambulation. Patient was tachycardic on arrival but that improved as his respiratory status improved after nebulizer treatment. Patient appears fluid overloaded on exam but he states this is his baseline. Laboratory work-up also showed lab values are consistent with his baseline. Rapid flu and COVID both negative. Patient has a care plan and I read it. Patient confirmed that he still makes urine. I believe the patient will be a good candidate for IV Lasix as described in the care plan. I gave him 60 mg IV Lasix and will be discharging him to follow-up with cardiology and PCP. Is this patient to be included in the SEP-1 Core Measure due to severe sepsis or septic shock? No   Exclusion criteria - the patient is NOT to be included for SEP-1 Core Measure due to: Infection is not suspected    I estimate there is LOW risk for ACUTE RESPIRATORY FAILURE, ACUTE CORONARY SYNDROME, SEVERE COPD/ASTHMA EXACERBATION, ACUTE EXACERBATION OF CONGESTIVE HEART FAILURE, PERICARDIAL TAMPONADE, PNEUMONIA WITH HYPOXIA, PNEUMOTHORAX, PULMONARY EMBOLISM WITH RIGHT HEART STRAIN, SEPSIS, and THORACIC DISSECTION,  thus I consider the discharge disposition reasonable. Pavel Seo and I have discussed the diagnosis and risks, and we agree with discharging home to follow-up with PCP and cardiology. We also discussed returning to the Emergency Department immediately if new or worsening symptoms occur.  We have discussed the symptoms which are most concerning (e.g., bloody sputum, fever, worsening pain or shortness of breath) that necessitate immediate return. Clinical Impression:  1. Acute on chronic congestive heart failure, unspecified heart failure type ([address]    2. ESRD (end stage renal disease) ([facility] [address]         For further details of Daquan Lacy [facility] emergency department encounter, please see Dr. Lei Sheets documentation. I personally saw the patient and performed a substantive portion of the visit including all aspects of the medical decision making. I personally saw the patient and independently provided 35 minutes of non-concurrent critical care out of the total shared critical care time provided. The critical care time excludes separately billable procedures and updating family. Time spent is specifically for management of the presenting complaint and symptoms initially, direct bedside care, reevaluation, review of records, and consultation. There was a high probability of clinically significant life-threatening deterioration in the patient's condition, which required my urgent intervention. This chart was generated in part by using Dragon Dictation system and may contain errors related to that system including errors in grammar, punctuation, and spelling, as well as words and phrases that may be inappropriate. If there are any questions or concerns please feel free to contact the dictating provider for clarification.         Pilo Samaniego MD  12/03/22 0107

## 2022-12-03 NOTE — TELEPHONE ENCOUNTER
Writer contacted Dr. Nino Camp to inform of 30 day readmission risk. Writer's attempt to contact Dr. Nino Camp was unsuccessful.     Call Back: If you need to call back to inform of disposition you can contact me at [phone]

## 2022-12-03 NOTE — ED NOTES
Per pt, \"If you d/c me, I'll still feel this way and I'll be right back. You should just admit me. \" Made MD aware of conversation.       Renetta Fonseca RN  12/03/22 9490

## 2022-12-03 NOTE — ED NOTES
Charge nurse unable to obtain IV access.  Will get MD to attempt EJ with ultrasound per her request     Lorenza Church RN  12/02/22 2816

## 2022-12-03 NOTE — ED NOTES
Pt ambulated approx 100 ft w/ baseline 3L O2 independently. C/o SOB but SPO2 96% or above. HR as high as 104. HR currently 96 at rest. MD made aware. Pt asked for 4th ginger ale. Gave pt 1 ginger ale.       Gillian Chery RN  12/03/22 4024

## 2022-12-03 NOTE — ED NOTES
Attempted to start IV unable at this time. 2 attempts made. Requested charge nurse to assist at this time.       Hayder Collier RN  12/02/22 8397

## 2022-12-03 NOTE — ED PROVIDER NOTES
[address] [facility]  ED  EMERGENCY DEPARTMENT ENCOUNTER      Pt Name: Yariel Diaz  MRN: [id number]  [place] 1970  Date of evaluation: 12/2/2022  Provider: Gucci Lowe DO    CHIEF COMPLAINT       Chief Complaint   Patient presents with    Shortness of Breath     Just discharged recently EMS found him 80% on 4L Started 3 hours ago Dialysis today took off 6L         HISTORY OF PRESENT ILLNESS   (Location/Symptom, Timing/Onset, Context/Setting, Quality, Duration, Modifying Factors, Severity)  Note limiting factors. Yariel Diaz is a 45 y.o. male who presents to the emergency department with concerns of shortness of breath. Patient is a 41-year-old male with past medical history significant for CHF, COPD, diabetes, end-stage renal disease on dialysis, hypertension, prior MI x9. Patient was recently discharged from hospital for pneumonia and recently completed a course of dialysis and is presenting today with concerns of shortness of breath. He states that his shortness of breath has been causing him pressure in his chest and he has been having difficulty with breathing. He states that he does not have any pain. He does endorse a productive cough with no sputum color noted. States that he is also having symptoms of swelling in his legs. With regards to his pneumonia he states that he was discharged with antibiotics. He states that he has also noticed some swelling in his legs. The history is provided by the patient. Nursing Notes were reviewed. REVIEW OF SYSTEMS    (2-9 systems for level 4, 10 or more for level 5)     Review of Systems   Constitutional:  Negative for chills. HENT:  Negative for congestion. Eyes:  Negative for pain. Respiratory:  Positive for cough, shortness of breath and wheezing. Cardiovascular:  Positive for chest pain and leg swelling. Negative for palpitations. Gastrointestinal:  Positive for diarrhea. Negative for abdominal pain. Neurological:  Negative for speech difficulty and light-headedness. Except as noted above the remainder of the review of systems was reviewed and negative.        PAST MEDICAL HISTORY     Past Medical History:   Diagnosis Date    Ambulatory dysfunction     walker for long distances, SOB with distance    Aortic stenosis     echo 2017    Arthritis     hands and hips    Asthma     Bilateral hilar adenopathy syndrome 6/3/2013    CAD (coronary artery disease)     Dr. Pedro Gastelum [facility]) 04/19/2019    EF= 43%    CHF (congestive heart failure) ([address]     Chronic pain     COPD (chronic obstructive pulmonary disease) ([address]     pulmonology Dr. Key Chavez    Depression     Diabetes mellitus ([address]     borderline    Difficult intravenous access     Emphysema of lung ([address]     ESRD (end stage renal disease) on dialysis ([address]     MWF    Fear of needles     Gastric ulcer     GERD (gastroesophageal reflux disease)     Heart valve problem     bicuspic valve    Hemodialysis patient ([address]     History of spinal fracture     work incident    Hx of blood clots     Bilateral lower extremities; stents in place    Hyperlipidemia     Hypertension     MI (myocardial infarction) ([address] 2019    has had 9 MIs. 2019 was the last    Neuromuscular disorder ([address]     due to CVA    Numbness and tingling in left arm     from fistula    Pneumonia     PONV (postoperative nausea and vomiting)     Prolonged emergence from general anesthesia     States requires more medication than most people    Sleep apnea     Uses CPAP    Stroke ([address]     7mm thalamic cva 2017 deficts left side, left side weakness    TIA (transient ischemic attack)     Unspecified diseases of blood and blood-forming organs          SURGICAL HISTORY       Past Surgical History:   Procedure Laterality Date    AORTIC VALVE REPLACEMENT N/A 10/15/2019    TRANSCATHETER AORTIC VALVE REPLACEMENT FEMORAL APPROACH performed by Claribel Farrell MD at [facility] REMOVAL Right 7/2/2019    PERITONEAL DIALYSIS CATHETER REMOVAL performed by Kasey Giron MD at [address]  2/29/2015    WNL    CORONARY ANGIOPLASTY WITH STENT PLACEMENT  05/26/15    CYST REMOVAL  08/14/2013    EXCISION CYSTS, NECK X2 AND ABDOMINAL benign    DIAGNOSTIC CARDIAC CATH LAB PROCEDURE      DIALYSIS FISTULA CREATION Left 10/30/2017    LEFT BRACHIAL CEPHALIC FISTULA    DIALYSIS FISTULA CREATION Left 3/27/2019    LIGATION  AV FISTULA performed by Bereket Jolley MD at [address] [facility], COLON, DIAGNOSTIC      IR TUNNELED CATHETER PLACEMENT GREATER THAN 5 YEARS  3/21/2022    IR TUNNELED CATHETER PLACEMENT GREATER THAN 5 YEARS 3/21/2022 [facility]    IR TUNNELED CATHETER PLACEMENT GREATER THAN 5 YEARS  4/21/2022    IR TUNNELED CATHETER PLACEMENT GREATER THAN 5 YEARS 4/21/2022 [facility]    IR TUNNELED CATHETER PLACEMENT GREATER THAN 5 YEARS  4/26/2022    IR TUNNELED CATHETER PLACEMENT GREATER THAN 5 YEARS 4/26/2022 [facility]    IR TUNNELED CATHETER PLACEMENT GREATER THAN 5 YEARS  6/23/2022    IR TUNNELED CATHETER PLACEMENT GREATER THAN 5 YEARS 6/23/2022 [facility]    OTHER SURGICAL HISTORY  02/01/2017    laparoscopic cholecystectomy with intraoperative cholangiogram    OTHER SURGICAL HISTORY  2018    PORT PLACEMENT  - vas cath    OTHER SURGICAL HISTORY Bilateral 06/26/2018    laprascopic peritoneal dialysis catheter placement    OTHER SURGICAL HISTORY Right 09/2018    peritoneal dialysis port placed on right side of abdomen    OTHER SURGICAL HISTORY  05/28/2019    PTA/Stenting R External Iliac artery    [place] LAP INSERTION TUNNELED INTRAPERITONEAL CATHETER N/A 9/21/2018    LAPAROSCOPIC PERITONEAL DIALYSIS CATHETER REPLACEMENT performed by Kasey Giron MD at [address] 2/24/2022    PERINEAL ABCESS DRAINAGE performed by Kasey Giron MD at [address] [place] [address] N/A 10/2/2022 THORACENTESIS ULTRASOUND performed by Cisco Gastelum MD at [address] [place] [address]  01/06/2016    UPPER GASTROINTESTINAL ENDOSCOPY  01/29/2017    possible candida, otherwise normal appearing    VASCULAR SURGERY  aprx 2 years ago    2 stents placed, each side of groin         CURRENT MEDICATIONS       Previous Medications    ALCOHOL SWABS PADS    USE AS DIRECTED    APIXABAN (ELIQUIS) 2.5 MG TABS TABLET    Take 1 tablet by mouth 2 times daily    B COMPLEX-C-FOLIC ACID (VIRT-CAPS) 1 MG CAPS    TAKE 1 CAPSULE BY MOUTH EVERY DAY    CALCIUM ACETATE, PHOS BINDER, 667 MG CAPS    TAKE 1 CAPSULE BY MOUTH THREE TIMES DAILY WITH MEALS    CALCIUM CARBONATE (TUMS) 500 MG CHEWABLE TABLET    Take 1 tablet by mouth 3 times daily as needed for Heartburn. CLOPIDOGREL (PLAVIX) 75 MG TABLET    Take 1 tablet by mouth daily    CYCLOBENZAPRINE (FLEXERIL) 5 MG TABLET    Muscle spasms    DOCUSATE SODIUM (DOK) 100 MG CAPSULE    Take 1 capsule by mouth daily    DULOXETINE (CYMBALTA) 30 MG EXTENDED RELEASE CAPSULE    TAKE 1 CAPSULE BY MOUTH EVERY DAY    GABAPENTIN (NEURONTIN) 100 MG CAPSULE    Take 2 capsules by mouth 3 times daily as needed (neuropathic pain) for up to 10 days. IPRATROPIUM-ALBUTEROL (DUONEB) 0.5-2.5 (3) MG/3ML SOLN NEBULIZER SOLUTION    Inhale 3 mLs into the lungs every 6 hours as needed for Shortness of Breath    ISOSORBIDE DINITRATE (ISORDIL) 20 MG TABLET    Take 1 tablet by mouth 3 times daily    METOPROLOL SUCCINATE (TOPROL XL) 100 MG EXTENDED RELEASE TABLET    Take 1 tablet by mouth in the morning and at bedtime    NITROGLYCERIN (NITROSTAT) 0.4 MG SL TABLET    DISSOLVE 1 TABLET UNDER THE TONGUE AS NEEDED FOR CHEST PAIN EVERY 5 MINUTES UP TO 3 TIMES. IF NO RELIEF CALL 911.     PANTOPRAZOLE (PROTONIX) 40 MG TABLET    TAKE 1 TABLET BY MOUTH EVERY MORNING BEFORE BREAKFAST    PRAVASTATIN (PRAVACHOL) 40 MG TABLET    Take 1 tablet by mouth daily    QUETIAPINE (SEROQUEL) 50 MG TABLET    TAKE 1 TABLET BY MOUTH EVERY EVENING    SENNOSIDES-DOCUSATE SODIUM (SENOKOT-S) 8.6-50 MG TABLET    Take 1 tablet by mouth daily    VITAMIN D (ERGOCALCIFEROL) 84915 UNITS CAPS CAPSULE    TK 1 C PO WEEKLY       ALLERGIES     Morphine    FAMILY HISTORY       Family History   Problem Relation Age of Onset    Diabetes Mother     Heart Disease Father     Kidney Disease Sister         stage 4-kidney failure    Cancer Sister     Heart Disease Sister     Obesity Sister     Cancer Sister     Heart Disease Sister     Obesity Sister     Alcohol Abuse Brother           SOCIAL HISTORY       Social History     Socioeconomic History    Marital status:      Spouse name: None    Number of children: None    Years of education: None    Highest education level: None   Tobacco Use    Smoking status: Former     Packs/day: 0.50     Years: 33.00     Pack years: 16.50     Types: Cigarettes     Quit date: 2020     Years since quittin.6    Smokeless tobacco: Never    Tobacco comments:     [facility] quit 2021   Vaping Use    Vaping Use: Never used   Substance and Sexual Activity    Alcohol use: Not Currently     Alcohol/week: 0.0 standard drinks     Comment: occ    Drug use: No    Sexual activity: Yes     Partners: Female     Comment:        SCREENINGS        [place] Coma Scale  Eye Opening: Spontaneous  Best Verbal Response: Oriented  Best Motor Response: Obeys commands  [place] Coma Scale Score: 15               PHYSICAL EXAM    (up to 7 for level 4, 8 or more for level 5)     ED Triage Vitals [22 2102]   BP Temp Temp Source Heart Rate Resp SpO2 Height Weight   (!) 197/98 99 °F (37.2 °C) Oral (!) 108 16 100 % 5' 9\" (1.753 m) 220 lb (99.8 kg)       Physical Exam  Constitutional:       General: He is in acute distress. Appearance: He is well-developed. He is obese. He is not ill-appearing or diaphoretic. HENT:      Head: Normocephalic.    Cardiovascular:      Rate and Rhythm: Regular rhythm. Tachycardia present. Pulses: Normal pulses. Heart sounds: Normal heart sounds. No murmur heard. Pulmonary:      Effort: Respiratory distress present. Breath sounds: Examination of the right-upper field reveals wheezing. Examination of the left-upper field reveals wheezing. Examination of the right-lower field reveals decreased breath sounds. Examination of the left-lower field reveals decreased breath sounds. Decreased breath sounds and wheezing present. Abdominal:      Palpations: Abdomen is soft. Musculoskeletal:      Right lower leg: No edema. Left lower leg: No edema. Skin:     General: Skin is warm. Neurological:      General: No focal deficit present. Mental Status: He is alert. DIAGNOSTIC RESULTS     EKG: All EKG's are interpreted by the Emergency Department Physician who either signs or Co-signs this chart in the absence of a cardiologist.    EKG indicative of tachycardic rate with normal rhythm. No other acute abnormalities noted on EKG, EKG shows no significant changes from prior EKG. RADIOLOGY:   Non-plain film images such as CT, Ultrasound and MRI are read by the radiologist. Plain radiographic images are visualized and preliminarily interpreted by the emergency physician with the below findings:    Interpretation per the Radiologist below, if available at the time of this note:    XR CHEST PORTABLE   Final Result   1. Congestive heart failure is most likely given the radiographic findings;   pneumonia is also a consideration in areas of consolidation with pleural   effusion. Similar appearance to prior study. 2. Calcific atherosclerosis aorta. 3. Cardiomegaly.                ED BEDSIDE ULTRASOUND:   Performed by ED Physician - none    LABS:  Labs Reviewed   BRAIN NATRIURETIC PEPTIDE - Abnormal; Notable for the following components:       Result Value    Pro-BNP >70,000 (*)     All other components within normal limits   TROPONIN - Abnormal; Notable for the following components:    Troponin 0.13 (*)     All other components within normal limits   COMPREHENSIVE METABOLIC PANEL W/ REFLEX TO MG FOR LOW K - Abnormal; Notable for the following components:    Glucose 211 (*)     BUN 25 (*)     Creatinine 4.0 (*)     Est, Glom Filt Rate 17 (*)     ALT <5 (*)     AST 12 (*)     All other components within normal limits   CBC WITH AUTO DIFFERENTIAL - Abnormal; Notable for the following components:    RBC 2.99 (*)     Hemoglobin 9.0 (*)     Hematocrit 26.7 (*)     RDW 16.3 (*)     Neutrophils Absolute 9.8 (*)     Lymphocytes Absolute 0.5 (*)     All other components within normal limits   BLOOD GAS, VENOUS - Abnormal; Notable for the following components:    pO2, Blade 87.2 (*)     Base Excess, Blade 3.1 (*)     Carboxyhemoglobin 2.1 (*)     All other components within normal limits   COVID-19 & INFLUENZA COMBO   C DIFF TOXIN/ANTIGEN   SAMPLE POSSIBLE BLOOD BANK TESTING       All other labs were within normal range or not returned as of this dictation. EMERGENCY DEPARTMENT COURSE and DIFFERENTIAL DIAGNOSIS/MDM:   Vitals:    Vitals:    12/03/22 0510 12/03/22 0532 12/03/22 0536 12/03/22 0606   BP: (!) 194/96 (!) 164/89 (!) 165/98 (!) 156/75   Pulse: (!) 102 92 98 91   Resp: 16 18 24 16   Temp:       TempSrc:       SpO2: 100% 100% 99% 100%   Weight:       Height:           MDM  Number of Diagnoses or Management Options  Diagnosis management comments: Patient showing signs of fluid overload possibly secondary to dialysis, CHF, pneumonia. Lab work-up in ED did not reveal any significant abnormalities from prior lab results. Patient remained stable during entire ED course. Did not show any worsening symptoms or require any additional oxygen from home baseline of 3 L. Patient is able to urinate, we will be giving patient dose of Lasix and discharging home.   Patient was instructed to follow-up with PCP and ED for any additional concerns or worsening symptoms. REASSESSMENT          CONSULTS:  None    PROCEDURES:  Unless otherwise noted below, none     Procedures    FINAL IMPRESSION      1. Acute on chronic congestive heart failure, unspecified heart failure type [facility])          DISPOSITION/PLAN   DISPOSITION Discharge - Pending Orders Complete 12/03/2022 05:45:14 AM      PATIENT REFERRED TO:  Stefaniaisa Evans, [address] [place] [address]  [phone]    Schedule an appointment as soon as possible for a visit       DISCHARGE MEDICATIONS:  New Prescriptions    No medications on file     Controlled Substances Monitoring:     RX Monitoring 8/4/2017   Attestation The Prescription Monitoring Report for this patient was reviewed today. Periodic Controlled Substance Monitoring No signs of potential drug abuse or diversion identified.        (Please note that portions of this note were completed with a voice recognition program.  Efforts were made to edit the dictations but occasionally words are mis-transcribed.)    Edwin Rees DO (electronically signed)  Resident Physician       Edwin Rees DO  Resident  12/03/22 1125

## 2022-12-04 ENCOUNTER — TELEPHONE (OUTPATIENT)
Dept: OTHER | Facility: CLINIC | Age: 52
End: 2022-12-04

## 2022-12-04 ENCOUNTER — APPOINTMENT (OUTPATIENT)
Dept: GENERAL RADIOLOGY | Age: 52
DRG: 280 | End: 2022-12-04
Payer: MEDICARE

## 2022-12-04 ENCOUNTER — HOSPITAL ENCOUNTER (INPATIENT)
Age: 52
DRG: 280 | End: 2022-12-04
Attending: EMERGENCY MEDICINE
Payer: MEDICARE

## 2022-12-04 DIAGNOSIS — I50.9 ACUTE ON CHRONIC CONGESTIVE HEART FAILURE, UNSPECIFIED HEART FAILURE TYPE (HCC): ICD-10-CM

## 2022-12-04 DIAGNOSIS — N18.6 ESRD (END STAGE RENAL DISEASE) (HCC): ICD-10-CM

## 2022-12-04 DIAGNOSIS — K59.00 CONSTIPATION, UNSPECIFIED CONSTIPATION TYPE: ICD-10-CM

## 2022-12-04 DIAGNOSIS — F32.A DEPRESSION, UNSPECIFIED DEPRESSION TYPE: ICD-10-CM

## 2022-12-04 DIAGNOSIS — J44.1 COPD EXACERBATION (HCC): ICD-10-CM

## 2022-12-04 DIAGNOSIS — J96.21 ACUTE ON CHRONIC RESPIRATORY FAILURE WITH HYPOXIA (HCC): Primary | ICD-10-CM

## 2022-12-04 PROBLEM — J96.20 ACUTE ON CHRONIC RESPIRATORY FAILURE (HCC): Status: ACTIVE | Noted: 2022-12-04

## 2022-12-04 LAB
A/G RATIO: 1.2 (ref 1.1–2.2)
ALBUMIN SERPL-MCNC: 3.7 G/DL (ref 3.4–5)
ALP BLD-CCNC: 66 U/L (ref 40–129)
ALT SERPL-CCNC: 7 U/L (ref 10–40)
ANION GAP SERPL CALCULATED.3IONS-SCNC: 14 MMOL/L (ref 3–16)
AST SERPL-CCNC: 8 U/L (ref 15–37)
BASOPHILS ABSOLUTE: 0 K/UL (ref 0–0.2)
BASOPHILS RELATIVE PERCENT: 0.3 %
BILIRUB SERPL-MCNC: 0.3 MG/DL (ref 0–1)
BILIRUBIN URINE: NEGATIVE
BLOOD, URINE: ABNORMAL
BUN BLDV-MCNC: 41 MG/DL (ref 7–20)
CALCIUM SERPL-MCNC: 8.6 MG/DL (ref 8.3–10.6)
CHLORIDE BLD-SCNC: 98 MMOL/L (ref 99–110)
CLARITY: CLEAR
CO2: 23 MMOL/L (ref 21–32)
COLOR: YELLOW
CREAT SERPL-MCNC: 6.1 MG/DL (ref 0.9–1.3)
EKG ATRIAL RATE: 128 BPM
EKG DIAGNOSIS: NORMAL
EKG P-R INTERVAL: 112 MS
EKG Q-T INTERVAL: 300 MS
EKG QRS DURATION: 90 MS
EKG QTC CALCULATION (BAZETT): 438 MS
EKG R AXIS: 44 DEGREES
EKG T AXIS: 127 DEGREES
EKG VENTRICULAR RATE: 128 BPM
EOSINOPHILS ABSOLUTE: 0.1 K/UL (ref 0–0.6)
EOSINOPHILS RELATIVE PERCENT: 0.8 %
EPITHELIAL CELLS, UA: ABNORMAL /HPF (ref 0–5)
GFR SERPL CREATININE-BSD FRML MDRD: 10 ML/MIN/{1.73_M2}
GLUCOSE BLD-MCNC: 237 MG/DL (ref 70–99)
GLUCOSE BLD-MCNC: 355 MG/DL (ref 70–99)
GLUCOSE BLD-MCNC: 361 MG/DL (ref 70–99)
GLUCOSE URINE: 500 MG/DL
HCT VFR BLD CALC: 26.2 % (ref 40.5–52.5)
HEMOGLOBIN: 8.6 G/DL (ref 13.5–17.5)
INFLUENZA A: NOT DETECTED
INFLUENZA B: NOT DETECTED
KETONES, URINE: NEGATIVE MG/DL
LACTIC ACID: 1.3 MMOL/L (ref 0.4–2)
LEUKOCYTE ESTERASE, URINE: ABNORMAL
LYMPHOCYTES ABSOLUTE: 0.4 K/UL (ref 1–5.1)
LYMPHOCYTES RELATIVE PERCENT: 3.1 %
MAGNESIUM: 1.6 MG/DL (ref 1.8–2.4)
MCH RBC QN AUTO: 29.8 PG (ref 26–34)
MCHC RBC AUTO-ENTMCNC: 32.9 G/DL (ref 31–36)
MCV RBC AUTO: 90.7 FL (ref 80–100)
MICROSCOPIC EXAMINATION: YES
MONOCYTES ABSOLUTE: 0.8 K/UL (ref 0–1.3)
MONOCYTES RELATIVE PERCENT: 5.9 %
NEUTROPHILS ABSOLUTE: 12.1 K/UL (ref 1.7–7.7)
NEUTROPHILS RELATIVE PERCENT: 89.9 %
NITRITE, URINE: NEGATIVE
PDW BLD-RTO: 16.4 % (ref 12.4–15.4)
PERFORMED ON: ABNORMAL
PERFORMED ON: ABNORMAL
PH UA: 8 (ref 5–8)
PLATELET # BLD: 326 K/UL (ref 135–450)
PMV BLD AUTO: 7.5 FL (ref 5–10.5)
POTASSIUM SERPL-SCNC: 4.3 MMOL/L (ref 3.5–5.1)
PRO-BNP: ABNORMAL PG/ML (ref 0–124)
PROTEIN UA: >=300 MG/DL
RBC # BLD: 2.89 M/UL (ref 4.2–5.9)
RBC UA: ABNORMAL /HPF (ref 0–4)
RSV RAPID ANTIGEN: NEGATIVE
SARS-COV-2 RNA, RT PCR: NOT DETECTED
SODIUM BLD-SCNC: 135 MMOL/L (ref 136–145)
SPECIFIC GRAVITY UA: 1.02 (ref 1–1.03)
TOTAL PROTEIN: 6.8 G/DL (ref 6.4–8.2)
TROPONIN: 0.14 NG/ML
URINE TYPE: ABNORMAL
UROBILINOGEN, URINE: 0.2 E.U./DL
WBC # BLD: 13.4 K/UL (ref 4–11)
WBC UA: ABNORMAL /HPF (ref 0–5)

## 2022-12-04 PROCEDURE — 84484 ASSAY OF TROPONIN QUANT: CPT

## 2022-12-04 PROCEDURE — 94640 AIRWAY INHALATION TREATMENT: CPT

## 2022-12-04 PROCEDURE — 94660 CPAP INITIATION&MGMT: CPT

## 2022-12-04 PROCEDURE — 85025 COMPLETE CBC W/AUTO DIFF WBC: CPT

## 2022-12-04 PROCEDURE — 6370000000 HC RX 637 (ALT 250 FOR IP): Performed by: EMERGENCY MEDICINE

## 2022-12-04 PROCEDURE — 2500000003 HC RX 250 WO HCPCS: Performed by: NURSE PRACTITIONER

## 2022-12-04 PROCEDURE — 6360000002 HC RX W HCPCS: Performed by: EMERGENCY MEDICINE

## 2022-12-04 PROCEDURE — 83880 ASSAY OF NATRIURETIC PEPTIDE: CPT

## 2022-12-04 PROCEDURE — 93010 ELECTROCARDIOGRAM REPORT: CPT | Performed by: INTERNAL MEDICINE

## 2022-12-04 PROCEDURE — 94761 N-INVAS EAR/PLS OXIMETRY MLT: CPT

## 2022-12-04 PROCEDURE — 87040 BLOOD CULTURE FOR BACTERIA: CPT

## 2022-12-04 PROCEDURE — 87807 RSV ASSAY W/OPTIC: CPT

## 2022-12-04 PROCEDURE — 83605 ASSAY OF LACTIC ACID: CPT

## 2022-12-04 PROCEDURE — 87636 SARSCOV2 & INF A&B AMP PRB: CPT

## 2022-12-04 PROCEDURE — 93005 ELECTROCARDIOGRAM TRACING: CPT | Performed by: EMERGENCY MEDICINE

## 2022-12-04 PROCEDURE — 71045 X-RAY EXAM CHEST 1 VIEW: CPT

## 2022-12-04 PROCEDURE — 2060000000 HC ICU INTERMEDIATE R&B

## 2022-12-04 PROCEDURE — 80053 COMPREHEN METABOLIC PANEL: CPT

## 2022-12-04 PROCEDURE — 99285 EMERGENCY DEPT VISIT HI MDM: CPT

## 2022-12-04 PROCEDURE — 83735 ASSAY OF MAGNESIUM: CPT

## 2022-12-04 PROCEDURE — 2700000000 HC OXYGEN THERAPY PER DAY

## 2022-12-04 PROCEDURE — 81001 URINALYSIS AUTO W/SCOPE: CPT

## 2022-12-04 RX ORDER — IPRATROPIUM BROMIDE AND ALBUTEROL SULFATE 2.5; .5 MG/3ML; MG/3ML
1 SOLUTION RESPIRATORY (INHALATION) EVERY 4 HOURS PRN
Status: DISCONTINUED | OUTPATIENT
Start: 2022-12-04 | End: 2022-12-14 | Stop reason: HOSPADM

## 2022-12-04 RX ORDER — FUROSEMIDE 10 MG/ML
60 INJECTION INTRAMUSCULAR; INTRAVENOUS ONCE
Status: COMPLETED | OUTPATIENT
Start: 2022-12-04 | End: 2022-12-04

## 2022-12-04 RX ORDER — IPRATROPIUM BROMIDE AND ALBUTEROL SULFATE 2.5; .5 MG/3ML; MG/3ML
1 SOLUTION RESPIRATORY (INHALATION)
Status: DISCONTINUED | OUTPATIENT
Start: 2022-12-04 | End: 2022-12-04

## 2022-12-04 RX ORDER — POLYETHYLENE GLYCOL 3350 17 G/17G
17 POWDER, FOR SOLUTION ORAL DAILY PRN
Status: DISCONTINUED | OUTPATIENT
Start: 2022-12-04 | End: 2022-12-14 | Stop reason: HOSPADM

## 2022-12-04 RX ORDER — METOPROLOL TARTRATE 5 MG/5ML
5 INJECTION INTRAVENOUS ONCE
Status: COMPLETED | OUTPATIENT
Start: 2022-12-04 | End: 2022-12-04

## 2022-12-04 RX ORDER — INSULIN LISPRO 100 [IU]/ML
0-4 INJECTION, SOLUTION INTRAVENOUS; SUBCUTANEOUS
Status: DISCONTINUED | OUTPATIENT
Start: 2022-12-04 | End: 2022-12-14 | Stop reason: HOSPADM

## 2022-12-04 RX ORDER — METOPROLOL SUCCINATE 50 MG/1
100 TABLET, EXTENDED RELEASE ORAL 2 TIMES DAILY
Status: DISCONTINUED | OUTPATIENT
Start: 2022-12-04 | End: 2022-12-14 | Stop reason: HOSPADM

## 2022-12-04 RX ORDER — INSULIN LISPRO 100 [IU]/ML
0-4 INJECTION, SOLUTION INTRAVENOUS; SUBCUTANEOUS NIGHTLY
Status: DISCONTINUED | OUTPATIENT
Start: 2022-12-04 | End: 2022-12-14 | Stop reason: HOSPADM

## 2022-12-04 RX ORDER — SODIUM CHLORIDE 9 MG/ML
INJECTION, SOLUTION INTRAVENOUS PRN
Status: DISCONTINUED | OUTPATIENT
Start: 2022-12-04 | End: 2022-12-14 | Stop reason: HOSPADM

## 2022-12-04 RX ORDER — SODIUM CHLORIDE 0.9 % (FLUSH) 0.9 %
5-40 SYRINGE (ML) INJECTION PRN
Status: DISCONTINUED | OUTPATIENT
Start: 2022-12-04 | End: 2022-12-14 | Stop reason: HOSPADM

## 2022-12-04 RX ORDER — ONDANSETRON 4 MG/1
4 TABLET, ORALLY DISINTEGRATING ORAL EVERY 8 HOURS PRN
Status: DISCONTINUED | OUTPATIENT
Start: 2022-12-04 | End: 2022-12-14 | Stop reason: HOSPADM

## 2022-12-04 RX ORDER — ACETAMINOPHEN 325 MG/1
650 TABLET ORAL EVERY 6 HOURS PRN
Status: DISCONTINUED | OUTPATIENT
Start: 2022-12-04 | End: 2022-12-14 | Stop reason: HOSPADM

## 2022-12-04 RX ORDER — LABETALOL HYDROCHLORIDE 5 MG/ML
20 INJECTION, SOLUTION INTRAVENOUS EVERY 4 HOURS PRN
Status: DISCONTINUED | OUTPATIENT
Start: 2022-12-04 | End: 2022-12-11

## 2022-12-04 RX ORDER — PANTOPRAZOLE SODIUM 40 MG/1
40 TABLET, DELAYED RELEASE ORAL
Status: DISCONTINUED | OUTPATIENT
Start: 2022-12-05 | End: 2022-12-14 | Stop reason: HOSPADM

## 2022-12-04 RX ORDER — LABETALOL HYDROCHLORIDE 5 MG/ML
10 INJECTION, SOLUTION INTRAVENOUS EVERY 4 HOURS PRN
Status: DISCONTINUED | OUTPATIENT
Start: 2022-12-04 | End: 2022-12-14 | Stop reason: HOSPADM

## 2022-12-04 RX ORDER — IPRATROPIUM BROMIDE AND ALBUTEROL SULFATE 2.5; .5 MG/3ML; MG/3ML
1 SOLUTION RESPIRATORY (INHALATION) ONCE
Status: COMPLETED | OUTPATIENT
Start: 2022-12-04 | End: 2022-12-04

## 2022-12-04 RX ORDER — QUETIAPINE FUMARATE 25 MG/1
50 TABLET, FILM COATED ORAL NIGHTLY
Status: DISCONTINUED | OUTPATIENT
Start: 2022-12-04 | End: 2022-12-14 | Stop reason: HOSPADM

## 2022-12-04 RX ORDER — DEXTROSE MONOHYDRATE 100 MG/ML
INJECTION, SOLUTION INTRAVENOUS CONTINUOUS PRN
Status: DISCONTINUED | OUTPATIENT
Start: 2022-12-04 | End: 2022-12-14 | Stop reason: HOSPADM

## 2022-12-04 RX ORDER — ACETAMINOPHEN 650 MG/1
650 SUPPOSITORY RECTAL EVERY 6 HOURS PRN
Status: DISCONTINUED | OUTPATIENT
Start: 2022-12-04 | End: 2022-12-14 | Stop reason: HOSPADM

## 2022-12-04 RX ORDER — CALCIUM CARBONATE 200(500)MG
1 TABLET,CHEWABLE ORAL 3 TIMES DAILY PRN
Status: DISCONTINUED | OUTPATIENT
Start: 2022-12-04 | End: 2022-12-14 | Stop reason: HOSPADM

## 2022-12-04 RX ORDER — ONDANSETRON 2 MG/ML
4 INJECTION INTRAMUSCULAR; INTRAVENOUS EVERY 6 HOURS PRN
Status: DISCONTINUED | OUTPATIENT
Start: 2022-12-04 | End: 2022-12-14 | Stop reason: HOSPADM

## 2022-12-04 RX ORDER — PRAVASTATIN SODIUM 40 MG
40 TABLET ORAL DAILY
Status: DISCONTINUED | OUTPATIENT
Start: 2022-12-05 | End: 2022-12-14 | Stop reason: HOSPADM

## 2022-12-04 RX ORDER — DULOXETIN HYDROCHLORIDE 30 MG/1
30 CAPSULE, DELAYED RELEASE ORAL DAILY
Status: DISCONTINUED | OUTPATIENT
Start: 2022-12-05 | End: 2022-12-14 | Stop reason: HOSPADM

## 2022-12-04 RX ORDER — CLOPIDOGREL BISULFATE 75 MG/1
75 TABLET ORAL DAILY
Status: DISCONTINUED | OUTPATIENT
Start: 2022-12-05 | End: 2022-12-14 | Stop reason: HOSPADM

## 2022-12-04 RX ORDER — OXYCODONE AND ACETAMINOPHEN 7.5; 325 MG/1; MG/1
1 TABLET ORAL EVERY 8 HOURS PRN
Status: DISCONTINUED | OUTPATIENT
Start: 2022-12-04 | End: 2022-12-14 | Stop reason: HOSPADM

## 2022-12-04 RX ORDER — ISOSORBIDE DINITRATE 20 MG/1
20 TABLET ORAL 3 TIMES DAILY
Status: DISCONTINUED | OUTPATIENT
Start: 2022-12-04 | End: 2022-12-14 | Stop reason: HOSPADM

## 2022-12-04 RX ORDER — CALCIUM ACETATE 667 MG/1
1 CAPSULE ORAL
Status: DISCONTINUED | OUTPATIENT
Start: 2022-12-04 | End: 2022-12-14 | Stop reason: HOSPADM

## 2022-12-04 RX ORDER — GABAPENTIN 100 MG/1
200 CAPSULE ORAL 3 TIMES DAILY PRN
Status: DISCONTINUED | OUTPATIENT
Start: 2022-12-04 | End: 2022-12-14 | Stop reason: HOSPADM

## 2022-12-04 RX ORDER — SODIUM CHLORIDE 0.9 % (FLUSH) 0.9 %
5-40 SYRINGE (ML) INJECTION EVERY 12 HOURS SCHEDULED
Status: DISCONTINUED | OUTPATIENT
Start: 2022-12-04 | End: 2022-12-14 | Stop reason: HOSPADM

## 2022-12-04 RX ORDER — IPRATROPIUM BROMIDE AND ALBUTEROL SULFATE 2.5; .5 MG/3ML; MG/3ML
2 SOLUTION RESPIRATORY (INHALATION) ONCE
Status: COMPLETED | OUTPATIENT
Start: 2022-12-04 | End: 2022-12-04

## 2022-12-04 RX ADMIN — FUROSEMIDE 60 MG: 10 INJECTION, SOLUTION INTRAMUSCULAR; INTRAVENOUS at 15:19

## 2022-12-04 RX ADMIN — IPRATROPIUM BROMIDE AND ALBUTEROL SULFATE 1 AMPULE: 2.5; .5 SOLUTION RESPIRATORY (INHALATION) at 13:12

## 2022-12-04 RX ADMIN — METOPROLOL TARTRATE 5 MG: 5 INJECTION, SOLUTION INTRAVENOUS at 18:33

## 2022-12-04 RX ADMIN — IPRATROPIUM BROMIDE AND ALBUTEROL SULFATE 2 AMPULE: 2.5; .5 SOLUTION RESPIRATORY (INHALATION) at 11:18

## 2022-12-04 RX ADMIN — NITROGLYCERIN 1 INCH: 20 OINTMENT TOPICAL at 12:18

## 2022-12-04 ASSESSMENT — PAIN - FUNCTIONAL ASSESSMENT: PAIN_FUNCTIONAL_ASSESSMENT: NONE - DENIES PAIN

## 2022-12-04 ASSESSMENT — PAIN SCALES - GENERAL: PAINLEVEL_OUTOF10: 0

## 2022-12-04 NOTE — ED PROVIDER NOTES
EMERGENCY DEPARTMENT ENCOUNTER        Pt Name: Connie Lewis  MRN: [id number]  [place] 1970  Date of evaluation: 12/4/2022  Provider: Satya Allen MD  PCP: Pcp No      CHIEF COMPLAINT       Chief Complaint   Patient presents with    Shortness of Breath     Pt to ED with c/o SOB for about an hour and half PTA. Pt arrives on bipap. Pt given 125 mg solumedrol IM and duoneb en route. Wears 2-3 L O2 at home. On arrival for EMS pt was 80% on 5 L. HISTORY OFPRESENT ILLNESS   (Location/Symptom, Timing/Onset, Context/Setting, Quality, Duration, Modifying Factors,Severity)  Note limiting factors. Connie Lewis is a 45 y.o. male presenting today due to concern for becoming severely short of breath about 1.5 hours prior to arrival and ultimately calling EMS who placed him on CPAP. He goes to dialysis Monday Wednesday Friday and did go on Friday. He was hot and clammy on arrival.  He does report taking his Eliquis and Plavix as prescribed. He has a history of COPD and CHF. I did just admit him to the hospital just a couple of weeks ago with a very similar presentation. He was in obvious respiratory distress on arrival and therefore history and physical are limited at this point. He did receive a DuoNeb and 125 mg Solu-Medrol by EMS prior to arrival.  No reported falls or trauma. REVIEW OF SYSTEMS    (2-9 systems for level 4, 10 or more for level 5)     Review of Systems   Constitutional:  Positive for activity change and diaphoresis. HENT:  Positive for congestion. Respiratory:  Positive for cough, chest tightness and shortness of breath. Cardiovascular:  Positive for chest pain. Gastrointestinal:  Negative for vomiting. Genitourinary:  Positive for decreased urine volume (on dialysis so chronic). Musculoskeletal:  Positive for gait problem (due to SOB). Negative for back pain. Skin:  Negative for wound (no reported falls).    Neurological:  Positive for weakness. Negative for headaches. Hematological:  Bruises/bleeds easily. Psychiatric/Behavioral:  The patient is nervous/anxious. Positives and Pertinent negatives as per HPI.       PASTMEDICAL HISTORY     Past Medical History:   Diagnosis Date    Ambulatory dysfunction     walker for long distances, SOB with distance    Aortic stenosis     echo 2017    Arthritis     hands and hips    Asthma     Bilateral hilar adenopathy syndrome 6/3/2013    CAD (coronary artery disease)     [facility]) 04/19/2019    EF= 43%    CHF (congestive heart failure) ([address]     Chronic pain     COPD (chronic obstructive pulmonary disease) ([address]     pulmonology Dr. Angelita Charles    Depression     Diabetes mellitus ([address]     borderline    Difficult intravenous access     Emphysema of lung ([address]     ESRD (end stage renal disease) on dialysis ([address]     MWF    Fear of needles     Gastric ulcer     GERD (gastroesophageal reflux disease)     Heart valve problem     bicuspic valve    Hemodialysis patient ([address]     History of spinal fracture     work incident    Hx of blood clots     Bilateral lower extremities; stents in place    Hyperlipidemia     Hypertension     MI (myocardial infarction) ([address] 2019    has had 9 MIs. 2019 was the last    Neuromuscular disorder ([address]     due to CVA    Numbness and tingling in left arm     from fistula    Pneumonia     PONV (postoperative nausea and vomiting)     Prolonged emergence from general anesthesia     States requires more medication than most people    Sleep apnea     Uses CPAP    Stroke ([address]     7mm thalamic cva 2017 deficts left side, left side weakness    TIA (transient ischemic attack)     Unspecified diseases of blood and blood-forming organs          SURGICAL HISTORY       Past Surgical History:   Procedure Laterality Date    AORTIC VALVE REPLACEMENT N/A 10/15/2019    TRANSCATHETER AORTIC VALVE REPLACEMENT FEMORAL APPROACH performed by Jean Paul Landa MD at [facility]    CATHETER REMOVAL Right 7/2/2019    PERITONEAL DIALYSIS CATHETER REMOVAL performed by Son Palafox MD at [address] [place] [address] Road  2/29/2015    WNL    CORONARY ANGIOPLASTY WITH STENT PLACEMENT  05/26/15    CYST REMOVAL  08/14/2013    EXCISION CYSTS, NECK X2 AND ABDOMINAL benign    DIAGNOSTIC CARDIAC CATH LAB PROCEDURE      DIALYSIS FISTULA CREATION Left 10/30/2017    LEFT BRACHIAL CEPHALIC FISTULA    DIALYSIS FISTULA CREATION Left 3/27/2019    LIGATION  AV FISTULA performed by Kayla Foster MD at  [address], COLON, DIAGNOSTIC      IR TUNNELED CATHETER PLACEMENT GREATER THAN 5 YEARS  3/21/2022    IR TUNNELED CATHETER PLACEMENT GREATER THAN 5 YEARS 3/21/2022 [facility]    IR TUNNELED CATHETER PLACEMENT GREATER THAN 5 YEARS  4/21/2022    IR TUNNELED CATHETER PLACEMENT GREATER THAN 5 YEARS 4/21/2022 [facility]    IR TUNNELED CATHETER PLACEMENT GREATER THAN 5 YEARS  4/26/2022    IR TUNNELED CATHETER PLACEMENT GREATER THAN 5 YEARS 4/26/2022 [facility]    IR TUNNELED CATHETER PLACEMENT GREATER THAN 5 YEARS  6/23/2022    IR TUNNELED CATHETER PLACEMENT GREATER THAN 5 YEARS 6/23/2022 [facility]    OTHER SURGICAL HISTORY  02/01/2017    laparoscopic cholecystectomy with intraoperative cholangiogram    OTHER SURGICAL HISTORY  2018    PORT PLACEMENT  - vas cath    OTHER SURGICAL HISTORY Bilateral 06/26/2018    laprascopic peritoneal dialysis catheter placement    OTHER SURGICAL HISTORY Right 09/2018    peritoneal dialysis port placed on right side of abdomen    OTHER SURGICAL HISTORY  05/28/2019    PTA/Stenting R External Iliac artery    NC LAP INSERTION TUNNELED INTRAPERITONEAL CATHETER N/A 9/21/2018    LAPAROSCOPIC PERITONEAL DIALYSIS CATHETER REPLACEMENT performed by Son Palafox MD at [address] [place] [address] Pkwy 2/24/2022    PERINEAL ABCESS DRAINAGE performed by Son Palafox MD at [place] [address] THORACENTESIS N/A 10/2/2022    THORACENTESIS ULTRASOUND performed by Svitlana Yousif MD at [address]  01/06/2016    UPPER GASTROINTESTINAL ENDOSCOPY  01/29/2017    possible candida, otherwise normal appearing    VASCULAR SURGERY  aprx 2 years ago    2 stents placed, each side of groin         CURRENT MEDICATIONS       Current Discharge Medication List        CONTINUE these medications which have NOT CHANGED    Details   apixaban (ELIQUIS) 2.5 MG TABS tablet Take 1 tablet by mouth 2 times daily  Qty: 60 tablet, Refills: 0      gabapentin (NEURONTIN) 100 MG capsule Take 2 capsules by mouth 3 times daily as needed (neuropathic pain) for up to 10 days.   Qty: 60 capsule, Refills: 0    Associated Diagnoses: Dialysis patient, noncompliant ([address]      metoprolol succinate (TOPROL XL) 100 MG extended release tablet Take 1 tablet by mouth in the morning and at bedtime  Qty: 30 tablet, Refills: 3      cyclobenzaprine (FLEXERIL) 5 MG tablet Muscle spasms    Associated Diagnoses: Chronic pain syndrome      QUEtiapine (SEROQUEL) 50 MG tablet TAKE 1 TABLET BY MOUTH EVERY EVENING  Qty: 30 tablet, Refills: 10    Associated Diagnoses: Insomnia, unspecified type; Mood disorder (HCC)      isosorbide dinitrate (ISORDIL) 20 MG tablet Take 1 tablet by mouth 3 times daily  Qty: 90 tablet, Refills: 3      clopidogrel (PLAVIX) 75 MG tablet Take 1 tablet by mouth daily  Qty: 90 tablet, Refills: 3      pantoprazole (PROTONIX) 40 MG tablet TAKE 1 TABLET BY MOUTH EVERY MORNING BEFORE BREAKFAST  Qty: 30 tablet, Refills: 10    Associated Diagnoses: Gastroesophageal reflux disease without esophagitis      docusate sodium (DOK) 100 MG capsule Take 1 capsule by mouth daily  Qty: 30 capsule, Refills: 5      DULoxetine (CYMBALTA) 30 MG extended release capsule TAKE 1 CAPSULE BY MOUTH EVERY DAY  Qty: 30 capsule, Refills: 10    Associated Diagnoses: Depression, unspecified depression type sennosides-docusate sodium (SENOKOT-S) 8.6-50 MG tablet Take 1 tablet by mouth daily  Qty: 10 tablet, Refills: 0    Associated Diagnoses: Constipation, unspecified constipation type      pravastatin (PRAVACHOL) 40 MG tablet Take 1 tablet by mouth daily  Qty: 90 tablet, Refills: 3      B Complex-C-Folic Acid (VIRT-CAPS) 1 MG CAPS TAKE 1 CAPSULE BY MOUTH EVERY DAY  Qty: 90 capsule, Refills: 1      Calcium Acetate, Phos Binder, 667 MG CAPS TAKE 1 CAPSULE BY MOUTH THREE TIMES DAILY WITH MEALS  Qty: 90 capsule, Refills: 3      nitroGLYCERIN (NITROSTAT) 0.4 MG SL tablet DISSOLVE 1 TABLET UNDER THE TONGUE AS NEEDED FOR CHEST PAIN EVERY 5 MINUTES UP TO 3 TIMES. IF NO RELIEF CALL 911. Qty: 25 tablet, Refills: 10    Associated Diagnoses: Coronary artery disease involving native heart without angina pectoris, unspecified vessel or lesion type      vitamin D (ERGOCALCIFEROL) 63253 units CAPS capsule TK 1 C PO WEEKLY  Refills: 11      Alcohol Swabs PADS USE AS DIRECTED  Qty: 300 each, Refills: 3      ipratropium-albuterol (DUONEB) 0.5-2.5 (3) MG/3ML SOLN nebulizer solution Inhale 3 mLs into the lungs every 6 hours as needed for Shortness of Breath  Qty: 360 mL, Refills: 1      calcium carbonate (TUMS) 500 MG chewable tablet Take 1 tablet by mouth 3 times daily as needed for Heartburn.              ALLERGIES     Morphine    FAMILY HISTORY       Family History   Problem Relation Age of Onset    Diabetes Mother     Heart Disease Father     Kidney Disease Sister         stage 4-kidney failure    Cancer Sister     Heart Disease Sister     Obesity Sister     Cancer Sister     Heart Disease Sister     Obesity Sister     Alcohol Abuse Brother           SOCIAL HISTORY       Social History     Socioeconomic History    Marital status:      Spouse name: None    Number of children: None    Years of education: None    Highest education level: None   Tobacco Use    Smoking status: Former     Packs/day: 0.50     Years: 33.00     Pack years: 16.50     Types: Cigarettes     Quit date: 2020     Years since quittin.6    Smokeless tobacco: Never    Tobacco comments:     States quit 2021   Vaping Use    Vaping Use: Never used   Substance and Sexual Activity    Alcohol use: Not Currently     Alcohol/week: 0.0 standard drinks     Comment: occ    Drug use: No    Sexual activity: Yes     Partners: Female     Comment:        SCREENINGS    Yeoman Coma Scale  Eye Opening: Spontaneous  Best Verbal Response: Oriented  Best Motor Response: Obeys commands  Robert Coma Scale Score: 15           PHYSICAL EXAM    (up to 7 for level 4, 8 or more for level 5)     ED Triage Vitals [22 1105]   BP Temp Temp Source Heart Rate Resp SpO2 Height Weight   (!) 199/115 97 °F (36.1 °C) Axillary (!) 131 23 99 % 5' 9\" (1.753 m) 220 lb (99.8 kg)       Physical Exam  Vitals and nursing note reviewed. Constitutional:       General: He is awake. He is in acute distress (severe). Appearance: He is well-developed. He is obese. He is ill-appearing, toxic-appearing and diaphoretic. Interventions: He is not intubated. HENT:      Head: Normocephalic and atraumatic. Right Ear: External ear normal.      Left Ear: External ear normal.      Mouth/Throat:      Pharynx: Oropharynx is clear. Eyes:      Pupils: Pupils are equal, round, and reactive to light. Cardiovascular:      Rate and Rhythm: Regular rhythm. Tachycardia present. Pulses:           Radial pulses are 2+ on the right side and 2+ on the left side. Pulmonary:      Effort: Tachypnea, accessory muscle usage and respiratory distress present. No bradypnea. He is not intubated. Breath sounds: No stridor. Examination of the right-upper field reveals decreased breath sounds. Examination of the left-upper field reveals decreased breath sounds. Examination of the right-middle field reveals decreased breath sounds.  Examination of the left-middle field reveals decreased breath sounds. Examination of the right-lower field reveals decreased breath sounds. Examination of the left-lower field reveals decreased breath sounds. Decreased breath sounds, wheezing and rhonchi present. Abdominal:      General: Bowel sounds are normal. There is no distension. Palpations: Abdomen is soft. Tenderness: There is no abdominal tenderness. There is no guarding or rebound. Musculoskeletal:         General: No deformity. Cervical back: Neck supple. No rigidity or tenderness. Right lower leg: Edema present. Left lower leg: Edema present. Skin:     General: Skin is warm. Coloration: Skin is not cyanotic or jaundiced. Neurological:      General: No focal deficit present. Mental Status: He is alert. Motor: No weakness. Psychiatric:         Mood and Affect: Mood is anxious. Behavior: Behavior is agitated. Behavior is cooperative.            DIAGNOSTIC RESULTS   :    Labs Reviewed   CBC WITH AUTO DIFFERENTIAL - Abnormal; Notable for the following components:       Result Value    WBC 13.4 (*)     RBC 2.89 (*)     Hemoglobin 8.6 (*)     Hematocrit 26.2 (*)     RDW 16.4 (*)     Neutrophils Absolute 12.1 (*)     Lymphocytes Absolute 0.4 (*)     All other components within normal limits   COMPREHENSIVE METABOLIC PANEL - Abnormal; Notable for the following components:    Sodium 135 (*)     Chloride 98 (*)     Glucose 237 (*)     BUN 41 (*)     Creatinine 6.1 (*)     Est, Glom Filt Rate 10 (*)     ALT 7 (*)     AST 8 (*)     All other components within normal limits    Narrative:     [email or web address] tel. [phone],  Chemistry results called to and read back by Arya Pritchett RN, 12/04/2022  12:56, by Erica López   TROPONIN - Abnormal; Notable for the following components:    Troponin 0.14 (*)     All other components within normal limits    Narrative:     [email or web address] tel. [phone],  Chemistry results called to and read back by Arya Pritchett RN, 12/04/2022  12:56, by [facility]   BRAIN NATRIURETIC PEPTIDE - Abnormal; Notable for the following components:    Pro-BNP >70,000 (*)     All other components within normal limits   MAGNESIUM - Abnormal; Notable for the following components:    Magnesium 1.60 (*)     All other components within normal limits    Narrative:     [facility] tel. [phone],  Chemistry results called to and read back by Regina Thomason RN, 12/04/2022  12:56, by [place] - Abnormal; Notable for the following components:    Glucose, Ur 500 (*)     Blood, Urine TRACE-INTACT (*)     Protein, UA >=300 (*)     Leukocyte Esterase, Urine TRACE (*)     WBC, UA 6-9 (*)     All other components within normal limits   PROCALCITONIN - Abnormal; Notable for the following components:    Procalcitonin 0.99 (*)     All other components within normal limits    Narrative:     Collection has been rescheduled by [facility] at 12/05/2022 05:04 Reason: No   suitable vein for venipuncture  Collection has been rescheduled by [facility] at 12/05/2022 05:09 Reason: I   don't stick this patient   BASIC METABOLIC PANEL - Abnormal; Notable for the following components:    Sodium 134 (*)     Chloride 97 (*)     CO2 17 (*)     Anion Gap 20 (*)     Glucose 220 (*)     BUN 54 (*)     Creatinine 7.1 (*)     Est, Glom Filt Rate 8 (*)     All other components within normal limits    Narrative:     CALL  [facility] tel. [phone],  Previous panic on this admission - call not needed per SOP, 12/05/2022 09:24,  by [facility]  Collection has been rescheduled by [facility] at 12/05/2022 05:04 Reason: No   suitable vein for venipuncture  Collection has been rescheduled by Brandee Butcher at 12/05/2022 05:09 Reason: I   don't stick this patient   POCT GLUCOSE - Abnormal; Notable for the following components:    POC Glucose 361 (*)     All other components within normal limits   POCT GLUCOSE - Abnormal; Notable for the following components:    POC Glucose 355 (*)     All other components within normal limits POCT GLUCOSE - Abnormal; Notable for the following components:    POC Glucose 203 (*)     All other components within normal limits   POCT GLUCOSE - Abnormal; Notable for the following components:    POC Glucose 150 (*)     All other components within normal limits   POCT GLUCOSE - Abnormal; Notable for the following components:    POC Glucose 171 (*)     All other components within normal limits   POCT GLUCOSE - Abnormal; Notable for the following components:    POC Glucose 139 (*)     All other components within normal limits   POCT GLUCOSE - Abnormal; Notable for the following components:    POC Glucose 161 (*)     All other components within normal limits   POCT GLUCOSE - Abnormal; Notable for the following components:    POC Glucose 144 (*)     All other components within normal limits   POCT GLUCOSE - Abnormal; Notable for the following components:    POC Glucose 146 (*)     All other components within normal limits   CULTURE, BLOOD 1    Narrative:     ORDER#: [id number]                          ORDERED BY: ARBEN WHITLOCK  SOURCE: Blood Antecubital-Rig              COLLECTED:  12/04/22 12:00  ANTIBIOTICS AT [facility].:                      RECEIVED :  12/05/22 01:36  If child <=2 yrs old please draw pediatric bottle. ~Blood Culture 1   CULTURE, BLOOD 2    Narrative:     ORDER#: [id number]                          ORDERED BY: ARBEN WHITLOCK  SOURCE: Blood Antecubital-Rig              COLLECTED:  12/04/22 13:22  ANTIBIOTICS AT [place].:                      RECEIVED :  12/05/22 01:36  If child <=2 yrs old please draw pediatric bottle. ~Blood Culture #2   COVID-19 & INFLUENZA COMBO   RSV RAPID ANTIGEN   LACTIC ACID   HEMOGLOBIN A1C    Narrative:     Collection has been rescheduled by [facility] at 12/05/2022 05:04 Reason: No   suitable vein for venipuncture  Collection has been rescheduled by Paige Kearns at 12/05/2022 05:09 Reason: I   don't stick this patient   LIPID PANEL   MAGNESIUM    Narrative:     CALL  [facility] tel. [phone],  Previous panic on this admission - call not needed per SOP, 12/05/2022 09:24,  by Yasmine Arambula has been rescheduled by [facility] at 12/05/2022 05:04 Reason: No   suitable vein for venipuncture  Collection has been rescheduled by Michelle Worrell at 12/05/2022 05:09 Reason: I   don't stick this patient   PHOSPHORUS   BASIC METABOLIC PANEL   MAGNESIUM   BASIC METABOLIC PANEL   MAGNESIUM   BASIC METABOLIC PANEL   MAGNESIUM   BRAIN NATRIURETIC PEPTIDE   POCT GLUCOSE   POCT GLUCOSE   POCT GLUCOSE   POCT GLUCOSE   POCT GLUCOSE   POCT GLUCOSE   POCT GLUCOSE   POCT GLUCOSE   POCT GLUCOSE   POCT GLUCOSE       All other labs were within normal range or not returned asof this dictation. EKG: All EKG's are interpreted by the Emergency Department Physician who either signs or Co-signs this chart in the absence of a cardiologist.    The Ekg interpreted by me shows  Sinus tachycardia with occasional PVC noted  with a rate of 128  Axis is   Normal  QTc is  normal  Intervals and Durations are unremarkable. ST Segments: no acute change and nonspecific changes  No significant change from prior EKG dated - 12/2/22  No STEMI         RADIOLOGY:   Non-plain film images such as CT, Ultrasound and MRI are read by the radiologist. Mary Beth Gile images are visualized and preliminarily interpreted by the  ED Provider with the belowfindings:        Interpretation per the Radiologist below, if available at the time of this note:    XR CHEST (2 VW)   Final Result   Stable left pleural effusion with associated airspace change in the left   lower lobe. Improved aeration in the right lung. XR CHEST PORTABLE   Final Result   Worsening aeration in the right lower lung zone, otherwise no significant   change compared with 12/02/2022.                PROCEDURES   Unless otherwise noted below, none     Procedures    CRITICAL CARE TIME   Critical Care Time:    I personally saw the patient and independently provided 40 minutes of non-concurrent critical care out of the total shared critical care time provided. Includes repeat examinations, speaking with consultants, lab  interpretation, charting, treating for acute on chronic respiratory failure requiring BiPAP  Excludes separate billable procedures. Patient at risk for serious decompensation if not treated for this life-threatening condition. CONSULTS: Spoke with Dr. Ophelia Haq for admission.   IP CONSULT TO HOSPITALIST  IP CONSULT TO HEART FAILURE NURSE/COORDINATOR  IP CONSULT TO DIETITIAN  IP CONSULT TO NEPHROLOGY  IP CONSULT TO PULMONOLOGY  IP CONSULT TO CASE MANAGEMENT    EMERGENCY DEPARTMENT COURSE and DIFFERENTIAL DIAGNOSIS/MDM:   Vitals:    Vitals:    12/06/22 1649 12/06/22 2000 12/06/22 2108 12/06/22 2148   BP: (!) 142/74 (!) 168/95 (!) 168/95    Pulse: 84 69 69    Resp: 18 17 17 28   Temp: 97.9 °F (36.6 °C) 97.6 °F (36.4 °C)     TempSrc: Oral Oral     SpO2: 96% 100%     Weight:       Height:           Patient was given the following medications:  Medications   apixaban (ELIQUIS) tablet 2.5 mg (2.5 mg Oral Given 12/6/22 2108)   calcium acetate (PHOSLO) capsule 667 mg (667 mg Oral Given 12/6/22 1728)   calcium carbonate (TUMS) chewable tablet 500 mg (has no administration in time range)   clopidogrel (PLAVIX) tablet 75 mg (75 mg Oral Given 12/6/22 0907)   DULoxetine (CYMBALTA) extended release capsule 30 mg (30 mg Oral Given 12/6/22 0907)   gabapentin (NEURONTIN) capsule 200 mg (has no administration in time range)   isosorbide dinitrate (ISORDIL) tablet 20 mg (20 mg Oral Given 12/6/22 2109)   metoprolol succinate (TOPROL XL) extended release tablet 100 mg (100 mg Oral Given 12/6/22 2108)   pantoprazole (PROTONIX) tablet 40 mg (40 mg Oral Not Given 12/6/22 0608)   pravastatin (PRAVACHOL) tablet 40 mg (40 mg Oral Given 12/6/22 0907)   QUEtiapine (SEROQUEL) tablet 50 mg (50 mg Oral Given 12/6/22 2109)   sodium chloride flush 0.9 % injection 5-40 mL (5 mLs IntraVENous Given 12/6/22 2111)   sodium chloride flush 0.9 % injection 5-40 mL (has no administration in time range)   0.9 % sodium chloride infusion (has no administration in time range)   ondansetron (ZOFRAN-ODT) disintegrating tablet 4 mg (has no administration in time range)     Or   ondansetron (ZOFRAN) injection 4 mg (has no administration in time range)   polyethylene glycol (GLYCOLAX) packet 17 g (has no administration in time range)   acetaminophen (TYLENOL) tablet 650 mg (has no administration in time range)     Or   acetaminophen (TYLENOL) suppository 650 mg (has no administration in time range)   glucose chewable tablet 16 g (has no administration in time range)   dextrose bolus 10% 125 mL (has no administration in time range)     Or   dextrose bolus 10% 250 mL (has no administration in time range)   glucagon (rDNA) injection 1 mg (has no administration in time range)   dextrose 10 % infusion (has no administration in time range)   insulin lispro (HUMALOG) injection vial 0-4 Units (0 Units SubCUTAneous Not Given 12/6/22 1652)   insulin lispro (HUMALOG) injection vial 0-4 Units (0 Units SubCUTAneous Not Given 12/6/22 2113)   oxyCODONE-acetaminophen (PERCOCET) 7.5-325 MG per tablet 1 tablet (1 tablet Oral Given 12/6/22 2108)   labetalol (NORMODYNE;TRANDATE) injection 10 mg (has no administration in time range)   labetalol (NORMODYNE;TRANDATE) injection 20 mg (has no administration in time range)   ipratropium-albuterol (DUONEB) nebulizer solution 1 ampule (1 ampule Inhalation Given 12/5/22 1919)   ipratropium-albuterol (DUONEB) nebulizer solution 2 ampule (2 ampules Inhalation Given 12/4/22 1118)   nitroglycerin (NITRO-BID) 2 % ointment 1 inch (1 inch Topical Given 12/4/22 1218)   ipratropium-albuterol (DUONEB) nebulizer solution 1 ampule (1 ampule Inhalation Given 12/4/22 1312)   furosemide (LASIX) injection 60 mg (60 mg IntraVENous Given 12/4/22 1519)   metoprolol (LOPRESSOR) injection 5 mg (5 mg IntraVENous Given 12/4/22 1833)     Patient was evaluated due to concern for severe shortness of breath on arrival needing CPAP by squad and still showing obvious signs of distress even when he got to the ED. He did receive steroids and a breathing treatment prior to me seeing him he did report some improvement with this. X-ray was concerning for worsening lung findings and therefore I do believe that he will ultimately need to be admitted. His care plan also recommended giving Lasix and I gave this as well. He did report at one point wishing he would just die to the nurse and I did make sure that Dr. Marco A Lund was aware of this in case he ultimately needs to speak to palliative care in case this is end-stage COPD or if he would be better off talking to psychiatry. Troponin was elevated although it is chronically elevated and this can be trended in the hospital but I do not suspect ACS at this time. I did reevaluate the patient after admission and he did appear much more comfortable and at that point was asking for something to drink and therefore I told the nurse to try to take him off of BiPAP for 10 minutes and give him a drink of water but otherwise he will need to go right back on it shortly after a small glass. He was admitted to the stepdown unit and agreed with this plan. He is already on Eliquis and I do not suspect pulmonary embolism at this point. I was the primary provider for the patient. The patient tolerated their visit well. The patient and / or the family were informed of the results of any tests, a time was given to answer questions. FINAL IMPRESSION      1. Acute on chronic respiratory failure with hypoxia (HCC)    2. COPD exacerbation ([address]    3. Acute on chronic congestive heart failure, unspecified heart failure type ([address]    4. ESRD (end stage renal disease) ([address]    5.  Depression, unspecified depression type          DISPOSITION/PLAN   DISPOSITION Admitted 12/04/2022 02:05:51 PM      PATIENT REFERRED TO:  No follow-up provider specified.     DISCHARGEMEDICATIONS:  Current Discharge Medication List          DISCONTINUED MEDICATIONS:  Current Discharge Medication List                 (Please note that portions of this note were completed with a voicerecognition program.  Efforts were made to edit the dictations but occasionally words are mis-transcribed.)    Wojciech Johnson MD (electronically signed)            Wojciech Johnson MD  12/06/22 Monalisa Mojica MD  12/06/22 [id number]

## 2022-12-04 NOTE — ED NOTES
1553 - PS to Dr Alyssa Robertson at [facility]  Re: ESRD, CHF, respiratory distress  1605 - Dr Alyssa Robertson at the bedside to assess pt at this time     Trey Askew  12/04/22 [place]  12/04/22 1725

## 2022-12-04 NOTE — TELEPHONE ENCOUNTER
Writer contacted ED provider to inform of 30-day readmission risk via phone . Writer informed of potential readmission. Callback: If you need to callback to inform of disposition, please call [phone].

## 2022-12-04 NOTE — CONSULTS
Called placed for nephrology consult ([facility]) by  Jimmy ewing, @ [phone] 12/4/22. Dr. Morena Bell @ bedside @ [phone] 12/4/22.  (See notes from  Jimmy ewing, @ [phone] 12/4/22)-Mary Dinah Cabot

## 2022-12-04 NOTE — CONSULTS
Thank you to requesting provider:  Dr. Amaya Brown , for asking us to see Gerardo Salvador  Reason for consultation:  ESRD  Chief Complaint:  Shortness of breath    History of Presenting Illness      44 y/o male with history of ESRD admitted with acute onset of shortness of breath. He was recently admitted with pneumonia. He says since discharge he has been having fevers and night sweats. He has been compliant with dialysis and he did got on Saturday. He had 3 liter UF on 12/4 with post weight of 99 kg which is below his prior target of 102 kg. He says this does not feel like fluid and does not think that dialysis will help.         Past Medical/Surgical History      Active Ambulatory Problems     Diagnosis Date Noted    Sepsis without acute organ dysfunction ([facility] 12/25/2012    CHF (congestive heart failure) ([facility] 05/14/2013    Asthma-COPD overlap syndrome ([facility] 05/14/2013    CAD (coronary artery disease) 05/14/2013    PVD (peripheral vascular disease) ([facility] 05/14/2013    Bicuspid aortic valve 06/03/2013    Bilateral hilar adenopathy syndrome 06/03/2013    Claudication in peripheral vascular disease ([address] 06/26/2013    Uncontrolled hypertension 11/14/2013    Diabetic neuropathy ([address] 11/14/2013    DM2 (diabetes mellitus, type 2) ([facility] 03/04/2014    Passive smoke exposure 05/30/2014    Depression with anxiety 06/05/2014    PNA (pneumonia) 03/28/2015    Obesity (BMI 30-39.9)     ZAINAB on CPAP 02/11/2016    Degeneration of lumbar or lumbosacral intervertebral disc 03/18/2016    Lumbar radiculopathy 03/18/2016    Lumbosacral spondylosis without myelopathy 08/82/2968    Biliary colic 54/50/1220    Symptomatic cholelithiasis 01/04/2017    Gastroparesis due to DM (HCC)     Angina, class IV (HCC)     Dyspnea     Dyslipidemia     Acute on chronic combined systolic and diastolic heart failure ([address]     Ischemic cardiomyopathy     Tobacco abuse 05/21/2017    CVA (cerebral vascular accident) ([address] 05/21/2017    History of CVA (cerebrovascular accident)     Type 2 diabetes mellitus without complication, without long-term current use of insulin (HCC)     ZAINAB (obstructive sleep apnea)     Pleural effusion on left     Chronic anemia     Nonrheumatic aortic valve stenosis     Mucus plugging of bronchi     Hemodialysis-associated hypotension 11/22/2017    ESRD (end stage renal disease) on dialysis ([address] 11/22/2017    Hypotension due to drugs 25/01/7256    Acute diastolic CHF (congestive heart failure) ([address] 03/18/2018    Neuromuscular disorder ([address]     Renovascular hypertension     Mixed hyperlipidemia     Cigarette nicotine dependence in remission     Pulmonary edema 11/09/2018    Fluid overload 11/09/2018    Anemia of chronic disease 11/12/2018    SOB (shortness of breath) on exertion 12/24/2018    Steal syndrome of dialysis vascular access ([facility])     Chronic, continuous use of opioids 04/15/2019    Chronic bronchitis ([address] 05/21/2019    Nasal congestion 05/21/2019    Hypercholesteremia 07/30/2019    Bradycardia 10/19/2019    History of transcatheter aortic valve replacement (TAVR) 10/19/2019    Syncope and collapse 10/19/2019    PAF (paroxysmal atrial fibrillation) ([facility])     Bilateral leg weakness 12/04/2020    GBS (Guillain-Vienna syndrome) ([address]     Sinus pause     Weakness of both lower extremities 12/30/2020    Sinus bradycardia 12/30/2020    Ataxia     Peripheral vascular occlusive disease ([address] 01/02/2021    Cellulitis of right foot 01/29/2021    Iliac artery occlusion, right ([facility])     Cellulitis and abscess of hand 04/24/2021    Type 2 diabetes mellitus with hyperglycemia ([address] 06/27/2021    Acute encephalopathy 06/27/2021    Acute hypoxemic respiratory failure ([address] 08/12/2021    Acute CVA (cerebrovascular accident) ([address] 09/07/2021    Speech problem     Urinary tract infection with hematuria     Respiratory failure with hypoxia ([address] 11/29/2021    Acute respiratory failure with hypercapnia ([address] 11/30/2021    Acute pulmonary edema ([address] 11/30/2021    Grade II diastolic dysfunction 47/14/5665    Shock circulatory ([address] 11/30/2021    Smoker 11/30/2021    Normocytic normochromic anemia 11/30/2021    NSTEMI (non-ST elevated myocardial infarction) ([address] 01/12/2022    SIRS (systemic inflammatory response syndrome) ([address] 02/21/2022    Atrial flutter ([address]     Liberty-rectal abscess     Somnolence     Pulmonary edema with diastolic CHF, NYHA class 3 ([address] 03/17/2022    Respiratory failure ([address] 04/18/2022    Former smoker 04/19/2022    Cardiomyopathy ([address] 04/19/2022    Morbid obesity with BMI of 40.0-44.9, adult ([address] 04/26/2022    Hypoglycemia 05/29/2022    Altered mental status     Hypertensive emergency 07/17/2022    SOB (shortness of breath) 07/17/2022    Acute respiratory failure with hypoxia and hypercapnia ([address] 09/23/2022    HFrEF (heart failure with reduced ejection fraction) ([address] 09/24/2022    Pericardial effusion 09/28/2022    Hypervolemia 10/19/2022    Acute pneumonia 11/25/2022     Resolved Ambulatory Problems     Diagnosis Date Noted    CHF, acute on chronic ([address] 05/12/2013    Leukocytosis 05/14/2013    PVD (peripheral vascular disease) ([address] 06/26/2013    Abscess 07/22/2013    Sebaceous cyst 07/22/2013    Wound infection after surgery 08/30/2013    Postop check 08/30/2013    Insomnia 06/05/2014    Sore throat 11/03/2014    Sinusitis, acute 02/05/2015    Abscess, abdomen 03/10/2015    Pre-operative cardiovascular examination     Elevated troponin     Non morbid obesity due to excess calories     Tobacco abuse 03/25/2020    Pain     ESRD on peritoneal dialysis ([address] 11/09/2018    Preoperative cardiovascular examination 04/04/2019    Syncope 10/19/2019     Past Medical History:   Diagnosis Date    Ambulatory dysfunction     Aortic stenosis     Arthritis     Asthma     Chronic pain     COPD (chronic obstructive pulmonary disease) ([address]     Depression     Diabetes mellitus ([facility]     Difficult intravenous access     Emphysema of lung ([address] [facility]     Fear of needles     Gastric ulcer     GERD (gastroesophageal reflux disease)     Heart valve problem     Hemodialysis patient ([address]     History of spinal fracture     Hx of blood clots     Hyperlipidemia     Hypertension     MI (myocardial infarction) ([address] 2019    Numbness and tingling in left arm     Pneumonia     PONV (postoperative nausea and vomiting)     Prolonged emergence from general anesthesia     Sleep apnea     Stroke ([address]     TIA (transient ischemic attack)     Unspecified diseases of blood and blood-forming organs          Review of Systems     Constitutional:  No weight loss, + fever/chills  Eyes:  No eye pain, no eye redness  Cardiovascular:  No chest pain, no worsening of edema  Respiratory:  No hemoptysis, no stridor  Gastrointestinal:  No blood in stool, no n/v, no diarrhea  Genitoruinary:  No hematuria, not making much urine  Musculoskeletal:  No joint swelling, no redness  Integumentary:  No Rash, no itching  Neurological:  No focal weakness, No new sensory deficit  Psychiatric:  No depression, no confusion  Endocrine:  No polyuria, no polydipsia       Medications      Reviewed in EMR     Allergies     Morphine      Family History       Negative for Kidney Disease    Social History      Social History     Socioeconomic History    Marital status:      Spouse name: None    Number of children: None    Years of education: None    Highest education level: None   Tobacco Use    Smoking status: Former     Packs/day: 0.50     Years: 33.00     Pack years: 16.50     Types: Cigarettes     Quit date: 2020     Years since quittin.6    Smokeless tobacco: Never    Tobacco comments:     States quit 2021   Vaping Use    Vaping Use: Never used   Substance and Sexual Activity    Alcohol use: Not Currently     Alcohol/week: 0.0 standard drinks     Comment: occ    Drug use: No    Sexual activity: Yes     Partners: Female     Comment:        Physical Exam     Blood pressure (!) 122/100, pulse (!) 123, temperature 98.2 °F (36.8 °C), temperature source Axillary, resp. rate 28, height 5' 9\" (1.753 m), weight 220 lb (99.8 kg), SpO2 100 %. General:  NAD, A+Ox3, ill-appearing, normal body habitus  HEENT:  PERRL, EOMI  Neck:  Supple, normal range of movement  Chest: On bilevel, no wheezing   CV:  Tachycardic, no rub   Abdomen:  NTND, soft, +BS, no hepatosplenomegaly  Extremities:  No peripheral edema  Neurological:  Moving all four extremities, CN II-XII grossly intact  Lymphatics:  No palpable lymph nodes  Skin:  No rash, no jaundice  Psychiatric:  Alert, flat affect  Access:  [facility] [place]     Data     Recent Labs     12/02/22  2354 12/04/22  1200   WBC 10.9 13.4*   HGB 9.0* 8.6*   HCT 26.7* 26.2*   MCV 89.5 90.7    326     Recent Labs     12/02/22  2354 12/04/22  1200    135*   K 3.8 4.3    98*   CO2 27 23   GLUCOSE 211* 237*   MG  --  1.60*   BUN 25* 41*   CREATININE 4.0* 6.1*   LABGLOM 17* 10*       Assessment:    ESRD:  On dialysis TTS at [facility]  Prior target weight of 102 kg but after recent hospitalization he was down to 99 kg ( outpatient )  Has working left [facility] [place]. Prior fistula with dialysis associated steal syndrome and he had severe needle phobia so it was not used and ligated  Shortness of breath:  Recent pneumonia. Having fevers at home and WBC elevated  He was compliant with dialysis and he is below prior target weight with post dialysis weight of 99 kg on 12/4  CXR with unchanged left effusion and worsening right patchy airspace disease, possible pneumonia. Possible pulmonary edema in setting of hypertension and systolic heart failure. Given his weight and lack of LE edema it seems total body volume is in reasonable shape for him.   He is already feeling better with bilevel and lowering of blood pressure    Anemia of chronic disease:  Below target   Getting DAVON at dialysis but with recent admission and frequent lab draws he is below baseline Hypertension:  Range improving       Plan:   Will arrange for extra dialysis treatment tomorrow to see if he will tolerate additional UF  Low threshold for restarting antibiotics for HCAP coverage   Follow labs  DAVON while here     Thank you for asking us to participate in the management of your patient, please do not hesitate to contact me for any concerns regarding my recommendations as outlined above.    -----------------------------  Kayden Duenas M.D.   [facility]

## 2022-12-04 NOTE — PLAN OF CARE
Received PS from ED regarding Possible Admission , Forwarded Message to   , Admitting Hospitalist     Serafin Choi MD  Hospitalist Physician

## 2022-12-04 NOTE — RT PROTOCOL NOTE
RT Inhaler-Nebulizer Bronchodilator Protocol Note    There is a bronchodilator order in the chart from a provider indicating to follow the RT Bronchodilator Protocol and there is an Initiate RT Inhaler-Nebulizer Bronchodilator Protocol order as well (see protocol at bottom of note). CXR Findings:  XR CHEST PORTABLE    Result Date: 12/4/2022  Worsening aeration in the right lower lung zone, otherwise no significant change compared with 12/02/2022. XR CHEST PORTABLE    Result Date: 12/2/2022  1. Congestive heart failure is most likely given the radiographic findings; pneumonia is also a consideration in areas of consolidation with pleural effusion. Similar appearance to prior study. 2. Calcific atherosclerosis aorta. 3. Cardiomegaly. The findings from the last RT Protocol Assessment were as follows:   History Pulmonary Disease: Chronic pulmonary disease  Respiratory Pattern: Dyspnea on exertion or RR 21-25 bpm  Breath Sounds: Slightly diminished and/or crackles  Cough: Strong, spontaneous, non-productive  Indication for Bronchodilator Therapy: On home bronchodilators  Bronchodilator Assessment Score: 6    Aerosolized bronchodilator medication orders have been revised according to the RT Inhaler-Nebulizer Bronchodilator Protocol below. Respiratory Therapist to perform RT Therapy Protocol Assessment initially then follow the protocol. Repeat RT Therapy Protocol Assessment PRN for score 0-3 or on second treatment, BID, and PRN for scores above 3. No Indications - adjust the frequency to every 6 hours PRN wheezing or bronchospasm, if no treatments needed after 48 hours then discontinue using Per Protocol order mode. If indication present, adjust the RT bronchodilator orders based on the Bronchodilator Assessment Score as indicated below.   Use Inhaler orders unless patient has one or more of the following: on home nebulizer, not able to hold breath for 10 seconds, is not alert and oriented, cannot activate and use MDI correctly, or respiratory rate 25 breaths per minute or more, then use the equivalent nebulizer order(s) with same Frequency and PRN reasons based on the score. If a patient is on this medication at home then do not decrease Frequency below that used at home. 0-3 - enter or revise RT bronchodilator order(s) to equivalent RT Bronchodilator order with Frequency of every 4 hours PRN for wheezing or increased work of breathing using Per Protocol order mode. 4-6 - enter or revise RT Bronchodilator order(s) to two equivalent RT bronchodilator orders with one order with BID Frequency and one order with Frequency of every 4 hours PRN wheezing or increased work of breathing using Per Protocol order mode. 7-10 - enter or revise RT Bronchodilator order(s) to two equivalent RT bronchodilator orders with one order with TID Frequency and one order with Frequency of every 4 hours PRN wheezing or increased work of breathing using Per Protocol order mode. 11-13 - enter or revise RT Bronchodilator order(s) to one equivalent RT bronchodilator order with QID Frequency and an Albuterol order with Frequency of every 4 hours PRN wheezing or increased work of breathing using Per Protocol order mode. Greater than 13 - enter or revise RT Bronchodilator order(s) to one equivalent RT bronchodilator order with every 4 hours Frequency and an Albuterol order with Frequency of every 2 hours PRN wheezing or increased work of breathing using Per Protocol order mode. RT to enter RT Home Evaluation for COPD & MDI Assessment order using Per Protocol order mode.     Electronically signed by Azul Abdalla RCP on 12/4/2022 at 5:48 PM

## 2022-12-04 NOTE — H&P
Hospital Medicine History & Physical      PCP: Pcp No    Date of Admission: 12/4/2022    Date of Service: Pt seen/examined on 12/4/2022 and Admitted to Inpatient with expected LOS greater than two midnights due to medical therapy. Chief Complaint:  SOB    History Of Present Illness:   45 y.o. male who presented to [facility] with SOB. PMHx significant for ESRD, Chronic Systolic CHF, s/p TAVR, CAD, HTN, DM2, COPD. He has frequent hospitalizations d/t respiratory issues and non-compliance. He has a Care Plan in place. He was recently admitted here last week and treated for Pneumonia. He continues HD as scheduled as an outpatient. He visited the ED on 12/2/22 with SOB, although symptoms improved with IV Lasix. He felt well at home on Saturday. However, he now presents after awakening with worsening SOB. He took off his CPAP and tried using higher amount of O2 without improvement. Symptoms progressively worsened and he presented to the ED with respiratory distress. Started on BIPAP support. CXR did show some worsening patchy airspace disease on the right base, stable fluid and airspace disease on the left relative to 2 days prior.      Past Medical History:        Diagnosis Date    Ambulatory dysfunction     walker for long distances, SOB with distance    Aortic stenosis     echo 2017    Arthritis     hands and hips    Asthma     Bilateral hilar adenopathy syndrome 6/3/2013    CAD (coronary artery disease)     Dr. Farhad Wan [facility]) 04/19/2019    EF= 43%    CHF (congestive heart failure) ([facility]     Chronic pain     COPD (chronic obstructive pulmonary disease) [facility])     pulmonology Dr. Delia Vail    Depression     Diabetes mellitus ([facility]     borderline    Difficult intravenous access     Emphysema of lung ([facility]     ESRD (end stage renal disease) on dialysis ([facility]     MWF    Fear of needles     Gastric ulcer     GERD (gastroesophageal reflux disease)     Heart valve problem bicuspic valve    Hemodialysis patient [facility])     History of spinal fracture     work incident    Hx of blood clots     Bilateral lower extremities; stents in place    Hyperlipidemia     Hypertension     MI (myocardial infarction) ([address] 2019    has had 9 MIs. 2019 was the last    Neuromuscular disorder ([address]     due to CVA    Numbness and tingling in left arm     from fistula    Pneumonia     PONV (postoperative nausea and vomiting)     Prolonged emergence from general anesthesia     States requires more medication than most people    Sleep apnea     Uses CPAP    Stroke ([facility]     7mm thalamic cva 2017 deficts left side, left side weakness    TIA (transient ischemic attack)     Unspecified diseases of blood and blood-forming organs        Past Surgical History:        Procedure Laterality Date    AORTIC VALVE REPLACEMENT N/A 10/15/2019    TRANSCATHETER AORTIC VALVE REPLACEMENT FEMORAL APPROACH performed by Noemí Crain MD at [facility] Right 7/2/2019    PERITONEAL DIALYSIS CATHETER REMOVAL performed by Estelle Choi MD at [address] [place] [address]  2/29/2015    WN    CORONARY ANGIOPLASTY WITH STENT PLACEMENT  05/26/15    CYST REMOVAL  08/14/2013    EXCISION CYSTS, NECK X2 AND ABDOMINAL benign    DIAGNOSTIC CARDIAC CATH LAB PROCEDURE      DIALYSIS FISTULA CREATION Left 10/30/2017    LEFT BRACHIAL CEPHALIC FISTULA    DIALYSIS FISTULA CREATION Left 3/27/2019    LIGATION  AV FISTULA performed by Juan Boogie MD at [id number] [place] [address], DIAGNOSTIC      IR TUNNELED CATHETER PLACEMENT GREATER THAN 5 YEARS  3/21/2022    IR TUNNELED CATHETER PLACEMENT GREATER THAN 5 YEARS 3/21/2022 [facility]    IR TUNNELED CATHETER PLACEMENT GREATER THAN 5 YEARS  4/21/2022    IR TUNNELED CATHETER PLACEMENT GREATER THAN 5 YEARS 4/21/2022 [facility]    IR TUNNELED CATHETER PLACEMENT GREATER THAN 5 YEARS  4/26/2022    IR TUNNELED CATHETER PLACEMENT GREATER THAN 5 YEARS 4/26/2022 [facility] SPECIAL PROCEDURES    IR TUNNELED CATHETER PLACEMENT GREATER THAN 5 YEARS  6/23/2022    IR TUNNELED CATHETER PLACEMENT GREATER THAN 5 YEARS 6/23/2022 [facility] SPECIAL PROCEDURES    OTHER SURGICAL HISTORY  02/01/2017    laparoscopic cholecystectomy with intraoperative cholangiogram    OTHER SURGICAL HISTORY  2018    PORT PLACEMENT  - vas cath    OTHER SURGICAL HISTORY Bilateral 06/26/2018    laprascopic peritoneal dialysis catheter placement    OTHER SURGICAL HISTORY Right 09/2018    peritoneal dialysis port placed on right side of abdomen    OTHER SURGICAL HISTORY  05/28/2019    PTA/Stenting R External Iliac artery    AK LAP INSERTION TUNNELED INTRAPERITONEAL CATHETER N/A 9/21/2018    LAPAROSCOPIC PERITONEAL DIALYSIS CATHETER REPLACEMENT performed by Jamel Zaragoza MD at [address] 2/24/2022    PERINEAL ABCESS DRAINAGE performed by Jamel Zaragoza MD at [address] [place] [address] N/A 10/2/2022    THORACENTESIS ULTRASOUND performed by Iris Garcia MD at [address] [place] [address]  01/06/2016    UPPER GASTROINTESTINAL ENDOSCOPY  01/29/2017    possible candida, otherwise normal appearing    VASCULAR SURGERY  aprx 2 years ago    2 stents placed, each side of groin       Medications Prior to Admission:   Prior to Admission medications    Medication Sig Start Date End Date Taking? Authorizing Provider   apixaban (ELIQUIS) 2.5 MG TABS tablet Take 1 tablet by mouth 2 times daily 11/1/22   Colonel Vargas, APRN - CNP   gabapentin (NEURONTIN) 100 MG capsule Take 2 capsules by mouth 3 times daily as needed (neuropathic pain) for up to 10 days.  10/20/22 10/30/22  Parris Edwards MD   metoprolol succinate (TOPROL XL) 100 MG extended release tablet Take 1 tablet by mouth in the morning and at bedtime 7/21/22   Juan Torres MD   cyclobenzaprine (FLEXERIL) 5 MG tablet Muscle spasms 7/21/22   Juan Torres MD QUEtiapine (SEROQUEL) 50 MG tablet TAKE 1 TABLET BY MOUTH EVERY EVENING 6/30/22   Santiago Clark MD   isosorbide dinitrate (ISORDIL) 20 MG tablet Take 1 tablet by mouth 3 times daily 6/8/22   JASE Hill   clopidogrel (PLAVIX) 75 MG tablet Take 1 tablet by mouth daily 5/9/22   JAY Mosley - CNP   pantoprazole (PROTONIX) 40 MG tablet TAKE 1 TABLET BY MOUTH EVERY MORNING BEFORE BREAKFAST 4/28/22   Santiago Clark MD   docusate sodium (DOK) 100 MG capsule Take 1 capsule by mouth daily  Patient taking differently: Take 100 mg by mouth as needed 4/27/22   Himanshu Lal MD   DULoxetine (CYMBALTA) 30 MG extended release capsule TAKE 1 CAPSULE BY MOUTH EVERY DAY 3/29/22   Santiago Clark MD   sennosides-docusate sodium (SENOKOT-S) 8.6-50 MG tablet Take 1 tablet by mouth daily  Patient taking differently: Take 1 tablet by mouth as needed 3/9/22   Santiago Clark MD   pravastatin (PRAVACHOL) 40 MG tablet Take 1 tablet by mouth daily 2/10/22   JAY Mosley - CNP   B Complex-C-Folic Acid (VIRT-CAPS) 1 MG CAPS TAKE 1 CAPSULE BY MOUTH EVERY DAY 9/20/21   Santiago Clark MD   Calcium Acetate, Phos Binder, 667 MG CAPS TAKE 1 CAPSULE BY MOUTH THREE TIMES DAILY WITH MEALS 8/12/21   Santiago Clark MD   nitroGLYCERIN (NITROSTAT) 0.4 MG SL tablet DISSOLVE 1 TABLET UNDER THE TONGUE AS NEEDED FOR CHEST PAIN EVERY 5 MINUTES UP TO 3 TIMES. IF NO RELIEF CALL 911. 1/7/21   Santiago Clark MD   vitamin D (ERGOCALCIFEROL) 76206 units CAPS capsule TK 1 C PO WEEKLY 6/2/19   Historical Provider, MD   Alcohol Swabs PADS USE AS DIRECTED 4/25/18   Neida Habermann, MD   ipratropium-albuterol (DUONEB) 0.5-2.5 (3) MG/3ML SOLN nebulizer solution Inhale 3 mLs into the lungs every 6 hours as needed for Shortness of Breath 10/15/17   Jenifer Raymundo MD   calcium carbonate (TUMS) 500 MG chewable tablet Take 1 tablet by mouth 3 times daily as needed for Heartburn.     Historical Provider, MD       Allergies: Morphine    Social History:    TOBACCO:   reports that he quit smoking about 2 years ago. His smoking use included cigarettes. He has a 16.50 pack-year smoking history. He has never used smokeless tobacco.  ETOH:   reports that he does not currently use alcohol. E-cigarette/Vaping       Questions Responses    E-cigarette/Vaping Use Never User    Start Date     Passive Exposure     Quit Date     Counseling Given     Comments               Family History:    Reviewed and does not pertain to current illness/condition. REVIEW OF SYSTEMS:   Pertinent positives as noted in the HPI. All other systems reviewed with patient as able and negative. Physical Exam Performed:  BP (!) 147/109   Pulse (!) 106   Temp 97 °F (36.1 °C) (Axillary)   Resp 26   Ht 5' 9\" (1.753 m)   Wt 220 lb (99.8 kg)   SpO2 100%   BMI 32.49 kg/m²   General appearance:  No apparent distress, appears stated age and cooperative. HEENT:  Pupils equal, round, and reactive to light. Conjunctivae/corneas clear. Neck:  Supple, no jugular venous distention. Trachea midline with full range of motion. Respiratory:  Normal respiratory effort. Clear to auscultation, bilaterally without Rales/Wheezes/Rhonchi. Cardiovascular:  Regular rate and rhythm with normal S1/S2 without murmurs, rubs or gallops. Abdomen:  Soft, non-tender, non-distended with normal bowel sounds. Musculoskelatal:  No clubbing, cyanosis or edema bilaterally. Full range of motion without deformity. Neurologic:  Neurovascularly intact without any focal sensory/motor deficits. Cranial nerves: II-XII intact, grossly non-focal.  Psychiatric:  Alert and oriented, thought content appropriate, normal insight  Skin:  Skin color, texture, turgor normal.  No rashes or lesions.   Capillary Refill:  Brisk,< 3 seconds   Peripheral Pulses:  +2 palpable, equal bilaterally     Labs:     Recent Labs     12/02/22  2354 12/04/22  1200   WBC 10.9 13.4*   HGB 9.0* 8.6*   HCT 26.7* 26.2*    326     Recent Labs     12/02/22  2354 12/04/22  1200    135*   K 3.8 4.3    98*   CO2 27 23   BUN 25* 41*   CREATININE 4.0* 6.1*   CALCIUM 9.0 8.6     Recent Labs     12/02/22  2354 12/04/22  1200   AST 12* 8*   ALT <5* 7*   BILITOT <0.2 0.3   ALKPHOS 63 66     No results for input(s): INR in the last 72 hours. Recent Labs     12/02/22  2354 12/04/22  1200   TROPONINI 0.13* 0.14*       Radiology:     CXR: I have reviewed the CXR with the following interpretation: worsening patchy airspace disease on the right base, stable fluid and airspace disease on the left relative to 2 days prior  EKG:  I have reviewed the EKG with the following interpretation: ST, PVCs, no acute ischemic changes. XR CHEST PORTABLE    Result Date: 12/4/2022  EXAMINATION: ONE XRAY VIEW OF THE CHEST 12/4/2022 11:21 am COMPARISON: 12/02/2022 and prior HISTORY: ORDERING SYSTEM PROVIDED HISTORY: SOB TECHNOLOGIST PROVIDED HISTORY: Reason for exam:->SOB Reason for Exam: sob FINDINGS: Left dual lumen catheter tip near the cavoatrial junction. Unchanged moderate left pleural effusion with adjacent patchy opacities. Increased patchy opacification overlying the right lower lung zone. Status post TAVR. [place] Unchanged mild cardiomegaly. [place] No pneumothorax. No acute osseous abnormality. Aortic knob calcifications. Worsening aeration in the right lower lung zone, otherwise no significant change compared with 12/02/2022. XR CHEST PORTABLE    Result Date: 12/2/2022  EXAMINATION: ONE XRAY VIEW OF THE CHEST 12/2/2022 9:29 pm COMPARISON: Chest 11/25/2022 HISTORY: ORDERING SYSTEM PROVIDED HISTORY: shortness of breath FINDINGS: The cardiac silhouette is enlarged. Sequela from TAVR. Calcifications involving the aorta reflect atherosclerosis. The mediastinal and hilar silhouettes appear unremarkable. Moderate size left pleural effusion with left basilar consolidation obscuring left hemidiaphragm and left heart margin bowel.  Vascular engorgement and cephalization is demonstrated with bilateral peribronchial cuffing and perivascular haziness. No focal consolidation or pleural effusion evident on the right. Unchanged left IJ hemodialysis catheter, tip at the superior cavoatrial junction level. No pneumothorax is seen. No acute osseous abnormality is identified. 1.  Congestive heart failure is most likely given the radiographic findings; pneumonia is also a consideration in areas of consolidation with pleural effusion. Similar appearance to prior study. 2. Calcific atherosclerosis aorta. 3. Cardiomegaly. XR CHEST PORTABLE    Result Date: 11/25/2022  EXAMINATION: ONE XRAY VIEW OF THE CHEST 11/25/2022 9:26 am COMPARISON: Chest x-ray dated 18 October 2022 HISTORY: ORDERING SYSTEM PROVIDED HISTORY: Shortness of Breath TECHNOLOGIST PROVIDED HISTORY: Reason for exam:->Shortness of Breath Reason for Exam: SOB FINDINGS: Moderate left pleural effusion with left basilar airspace disease. Right lung is clear. No pneumothorax. No cardiomegaly. Left-sided central venous catheter with the tip in the superior vena cava. Moderate left pleural effusion with left basilar airspace disease. This could represent atelectasis or pneumonia in the appropriate clinical setting. Consults:    IP CONSULT TO HOSPITALIST      ASSESSMENT:    Principal Problem:    Acute on chronic respiratory failure (HCC)  Active Problems:    DM2 (diabetes mellitus, type 2) (HCC)    Acute on chronic combined systolic and diastolic heart failure (HCC)    History of transcatheter aortic valve replacement (TAVR)    Obesity (BMI 30-39. 9)  Resolved Problems:    * No resolved hospital problems. *      PLAN:    Acute on chronic respiratory failure with Hypoxia: Recurrent presentation, just here 2 days prior. Frequent admissions for fluid overload and missed dialysis. However, reports compliance with HD this week. Recent treatment for Pneumonia.  Currently, he does have mild leukocytosis but lacking other features of pneumonia. Can check Procalcitonin to compare to prior. However, will continue treatment for presumed volume overload/CHF with IV Lasix and HD. Nephrology consulted. May need input from Cardiology and Pulmonology (not yet ordered). He does report using NIPPV at home (unclear if CPAP or BIPAP). Wonder if this may need to be adjusted. Acute on chronic combined systolic and diastolic heart failure: Known LVEF 20-25%. Continue IV diuresis and HD. Monitor I/O, weights, BMP. Medical management. Diabetes Mellitus, Type 2: Diet Controlled. Recheck HbA1c. Low SSI if needed. Morbid Obesity -  With Body mass index is 32.49 kg/m². Complicating assessment and treatment. Placing patient at risk for multiple co-morbidities as well as early death and contributing to the patient's presentation. Paroxysmal Atrial Fibrillation: Stable.  Continue Eliquis 2.5 mg BID    DVT Prophylaxis: Eliquis  Diet: Adult  Code Status: Full  PT/OT Eval Status: NA  Dispo - > 2 days     Campos Crespo MD

## 2022-12-05 ENCOUNTER — TELEPHONE (OUTPATIENT)
Dept: OTHER | Facility: CLINIC | Age: 52
End: 2022-12-05

## 2022-12-05 LAB
ANION GAP SERPL CALCULATED.3IONS-SCNC: 20 MMOL/L (ref 3–16)
BUN BLDV-MCNC: 54 MG/DL (ref 7–20)
CALCIUM SERPL-MCNC: 8.6 MG/DL (ref 8.3–10.6)
CHLORIDE BLD-SCNC: 97 MMOL/L (ref 99–110)
CHOLESTEROL, TOTAL: 138 MG/DL (ref 0–199)
CO2: 17 MMOL/L (ref 21–32)
CREAT SERPL-MCNC: 7.1 MG/DL (ref 0.9–1.3)
GFR SERPL CREATININE-BSD FRML MDRD: 8 ML/MIN/{1.73_M2}
GLUCOSE BLD-MCNC: 150 MG/DL (ref 70–99)
GLUCOSE BLD-MCNC: 171 MG/DL (ref 70–99)
GLUCOSE BLD-MCNC: 203 MG/DL (ref 70–99)
GLUCOSE BLD-MCNC: 220 MG/DL (ref 70–99)
HDLC SERPL-MCNC: 58 MG/DL (ref 40–60)
LDL CHOLESTEROL CALCULATED: 65 MG/DL
MAGNESIUM: 1.8 MG/DL (ref 1.8–2.4)
PERFORMED ON: ABNORMAL
PHOSPHORUS: 4.1 MG/DL (ref 2.5–4.9)
POTASSIUM SERPL-SCNC: 4.9 MMOL/L (ref 3.5–5.1)
PROCALCITONIN: 0.99 NG/ML (ref 0–0.15)
SODIUM BLD-SCNC: 134 MMOL/L (ref 136–145)
TRIGL SERPL-MCNC: 75 MG/DL (ref 0–150)
VLDLC SERPL CALC-MCNC: 15 MG/DL

## 2022-12-05 PROCEDURE — 94761 N-INVAS EAR/PLS OXIMETRY MLT: CPT

## 2022-12-05 PROCEDURE — 90935 HEMODIALYSIS ONE EVALUATION: CPT

## 2022-12-05 PROCEDURE — 2580000003 HC RX 258: Performed by: INTERNAL MEDICINE

## 2022-12-05 PROCEDURE — 94640 AIRWAY INHALATION TREATMENT: CPT

## 2022-12-05 PROCEDURE — 6370000000 HC RX 637 (ALT 250 FOR IP): Performed by: INTERNAL MEDICINE

## 2022-12-05 PROCEDURE — 5A1D70Z PERFORMANCE OF URINARY FILTRATION, INTERMITTENT, LESS THAN 6 HOURS PER DAY: ICD-10-PCS | Performed by: INTERNAL MEDICINE

## 2022-12-05 PROCEDURE — 83036 HEMOGLOBIN GLYCOSYLATED A1C: CPT

## 2022-12-05 PROCEDURE — 2700000000 HC OXYGEN THERAPY PER DAY

## 2022-12-05 PROCEDURE — 80061 LIPID PANEL: CPT

## 2022-12-05 PROCEDURE — 2060000000 HC ICU INTERMEDIATE R&B

## 2022-12-05 PROCEDURE — 84100 ASSAY OF PHOSPHORUS: CPT

## 2022-12-05 PROCEDURE — 83735 ASSAY OF MAGNESIUM: CPT

## 2022-12-05 PROCEDURE — 84145 PROCALCITONIN (PCT): CPT

## 2022-12-05 PROCEDURE — 36415 COLL VENOUS BLD VENIPUNCTURE: CPT

## 2022-12-05 PROCEDURE — 2500000003 HC RX 250 WO HCPCS: Performed by: INTERNAL MEDICINE

## 2022-12-05 PROCEDURE — 80048 BASIC METABOLIC PNL TOTAL CA: CPT

## 2022-12-05 PROCEDURE — 94660 CPAP INITIATION&MGMT: CPT

## 2022-12-05 RX ADMIN — PRAVASTATIN SODIUM 40 MG: 40 TABLET ORAL at 14:23

## 2022-12-05 RX ADMIN — METOPROLOL SUCCINATE 100 MG: 50 TABLET, EXTENDED RELEASE ORAL at 20:50

## 2022-12-05 RX ADMIN — QUETIAPINE FUMARATE 50 MG: 25 TABLET ORAL at 20:48

## 2022-12-05 RX ADMIN — APIXABAN 2.5 MG: 2.5 TABLET, FILM COATED ORAL at 14:20

## 2022-12-05 RX ADMIN — APIXABAN 2.5 MG: 2.5 TABLET, FILM COATED ORAL at 20:51

## 2022-12-05 RX ADMIN — SODIUM CHLORIDE, PRESERVATIVE FREE 10 ML: 5 INJECTION INTRAVENOUS at 14:20

## 2022-12-05 RX ADMIN — ISOSORBIDE DINITRATE 20 MG: 20 TABLET ORAL at 14:20

## 2022-12-05 RX ADMIN — METOPROLOL SUCCINATE 100 MG: 50 TABLET, EXTENDED RELEASE ORAL at 14:20

## 2022-12-05 RX ADMIN — DULOXETINE HYDROCHLORIDE 30 MG: 30 CAPSULE, DELAYED RELEASE ORAL at 14:20

## 2022-12-05 RX ADMIN — ISOSORBIDE DINITRATE 20 MG: 20 TABLET ORAL at 20:49

## 2022-12-05 RX ADMIN — CLOPIDOGREL BISULFATE 75 MG: 75 TABLET ORAL at 14:20

## 2022-12-05 RX ADMIN — IPRATROPIUM BROMIDE AND ALBUTEROL SULFATE 1 AMPULE: 2.5; .5 SOLUTION RESPIRATORY (INHALATION) at 19:19

## 2022-12-05 RX ADMIN — CALCIUM ACETATE 667 MG: 667 CAPSULE ORAL at 17:40

## 2022-12-05 RX ADMIN — SODIUM CHLORIDE, PRESERVATIVE FREE 5 ML: 5 INJECTION INTRAVENOUS at 20:51

## 2022-12-05 RX ADMIN — OXYCODONE AND ACETAMINOPHEN 1 TABLET: 7.5; 325 TABLET ORAL at 20:50

## 2022-12-05 ASSESSMENT — ENCOUNTER SYMPTOMS: TACHYPNEA: 1

## 2022-12-05 ASSESSMENT — PAIN DESCRIPTION - LOCATION: LOCATION: BACK

## 2022-12-05 ASSESSMENT — PAIN SCALES - GENERAL
PAINLEVEL_OUTOF10: 7
PAINLEVEL_OUTOF10: 0

## 2022-12-05 NOTE — PROGRESS NOTES
12/05/22 0013   NIV Type   Equipment Type v60   Mode Bilevel   Mask Type Full face mask   Mask Size Medium   Settings/Measurements   IPAP 16 cmH20   CPAP/EPAP 8 cmH2O   Vt (Measured) 606 mL   Rate Ordered 16   Resp 27   FiO2  50 %   Minute Volume (L/min) 17.7 Liters   Mask Leak (lpm) 10 lpm   Comfort Level Good   Using Accessory Muscles No   SpO2 100   Patient's Home Machine No

## 2022-12-05 NOTE — PROGRESS NOTES
CC:ESRD  HPI     46 y/o male with history of ESRD admitted with acute onset of shortness of breath. He was recently admitted with pneumonia. He says since discharge he has been having fevers and night sweats. He has been compliant with dialysis and he did got on Saturday 12/3. He had 3 liter UF on 12/4 with post weight of 99 kg which is below his prior target of 101kg.       SUBJECTIVE  Seen during HD  Tolerating the procedure well    SOC: no visitors      Scheduled Meds:   apixaban  2.5 mg Oral BID    calcium acetate  1 capsule Oral TID WC    clopidogrel  75 mg Oral Daily    DULoxetine  30 mg Oral Daily    isosorbide dinitrate  20 mg Oral TID    metoprolol succinate  100 mg Oral BID    pantoprazole  40 mg Oral QAM AC    pravastatin  40 mg Oral Daily    QUEtiapine  50 mg Oral Nightly    sodium chloride flush  5-40 mL IntraVENous 2 times per day    insulin lispro  0-4 Units SubCUTAneous TID WC    insulin lispro  0-4 Units SubCUTAneous Nightly     Continuous Infusions:   sodium chloride      dextrose       PRN Meds:calcium carbonate, gabapentin, sodium chloride flush, sodium chloride, ondansetron **OR** ondansetron, polyethylene glycol, acetaminophen **OR** acetaminophen, glucose, dextrose bolus **OR** dextrose bolus, glucagon (rDNA), dextrose, oxyCODONE-acetaminophen, labetalol, labetalol, ipratropium-albuterol      Objective:      Physical Exam     Wt Readings from Last 3 Encounters:   12/05/22 231 lb (104.8 kg)   12/02/22 220 lb (99.8 kg)   11/28/22 253 lb 8.5 oz (115 kg)     Temp Readings from Last 3 Encounters:   12/05/22 97.7 °F (36.5 °C) (Axillary)   12/03/22 97 °F (36.1 °C)   11/28/22 98.1 °F (36.7 °C)     BP Readings from Last 3 Encounters:   12/05/22 (!) 153/72   12/03/22 (!) 156/75   11/28/22 132/78     Pulse Readings from Last 3 Encounters:   12/05/22 99   12/03/22 91   11/28/22 63    General:  NAD, A+Ox3, ill-appearing, normal body habitus  HEENT:  PERRL, EOMI  Neck:  Supple, normal range of movement  Chest: On bilevel, no wheezing   CV:  Tachycardic, no rub   Abdomen:  NTND, soft, +BS, no hepatosplenomegaly  Extremities:  No peripheral edema  Neurological:  Moving all four extremities, CN II-XII grossly intact  Lymphatics:  No palpable lymph nodes  Skin:  No rash, no jaundice  Psychiatric:  Alert, flat affect  Access:  Left [address]           Lab Review   Lab Results   Component Value Date    WBC 13.4 (H) 12/04/2022    HGB 8.6 (L) 12/04/2022    HCT 26.2 (L) 12/04/2022    MCV 90.7 12/04/2022     12/04/2022     Lab Results   Component Value Date/Time     12/04/2022 12:00 PM    K 4.3 12/04/2022 12:00 PM    K 3.8 12/02/2022 11:54 PM    CL 98 12/04/2022 12:00 PM    CO2 23 12/04/2022 12:00 PM    BUN 41 12/04/2022 12:00 PM    CREATININE 6.1 12/04/2022 12:00 PM    GLUCOSE 237 12/04/2022 12:00 PM    CALCIUM 8.6 12/04/2022 12:00 PM            Patient Active Problem List    Diagnosis Date Noted    Hypotension due to drugs 11/25/2017    Acute on chronic combined systolic and diastolic heart failure (HCC)     Ischemic cardiomyopathy     Dyslipidemia     DM2 (diabetes mellitus, type 2) ([facility] 03/04/2014    Bicuspid aortic valve 06/03/2013    CHF (congestive heart failure) ([facility] 05/14/2013    CAD (coronary artery disease) 05/14/2013    PVD (peripheral vascular disease) ([facility] 05/14/2013    Acute on chronic respiratory failure ([address] 12/04/2022    Acute pneumonia 11/25/2022    Hypervolemia 10/19/2022    Pericardial effusion 09/28/2022    HFrEF (heart failure with reduced ejection fraction) ([facility] 09/24/2022    Acute respiratory failure with hypoxia and hypercapnia ([facility] 09/23/2022    Hypertensive emergency 07/17/2022    SOB (shortness of breath) 07/17/2022    Altered mental status     Hypoglycemia 05/29/2022    Morbid obesity with BMI of 40.0-44.9, adult ([facility] 04/26/2022    Former smoker 04/19/2022    Cardiomyopathy ([facility] 04/19/2022    History of transcatheter aortic valve replacement (TAVR) 10/19/2019 Uncontrolled hypertension 11/14/2013    Asthma-COPD overlap syndrome ([address] 05/14/2013    Respiratory failure ([address] 04/18/2022    Pulmonary edema with diastolic CHF, NYHA class 3 ([address] 03/17/2022    [place]-rectal abscess     Somnolence     Atrial flutter ([address]     SIRS (systemic inflammatory response syndrome) ([address] 02/21/2022    NSTEMI (non-ST elevated myocardial infarction) ([address] 01/12/2022    Acute respiratory failure with hypercapnia ([address] 11/30/2021    Acute pulmonary edema ([address] 11/30/2021    Grade II diastolic dysfunction 04/09/6084    Shock circulatory ([address] 11/30/2021    Smoker 11/30/2021    Normocytic normochromic anemia 11/30/2021    Respiratory failure with hypoxia ([address] 11/29/2021    Speech problem     Urinary tract infection with hematuria     Acute CVA (cerebrovascular accident) ([address] 09/07/2021    Acute hypoxemic respiratory failure ([address] 08/12/2021    Type 2 diabetes mellitus with hyperglycemia ([address] 06/27/2021    Acute encephalopathy 06/27/2021    Cellulitis and abscess of hand 04/24/2021    Iliac artery occlusion, right (HCC)     Cellulitis of right foot 01/29/2021    Peripheral vascular occlusive disease ([address] 01/02/2021    Ataxia     Weakness of both lower extremities 12/30/2020    Sinus bradycardia 12/30/2020    Sinus pause     GBS (Guillain-[place] syndrome) ([facility]     Bilateral leg weakness 12/04/2020    Bradycardia 10/19/2019    Syncope and collapse 10/19/2019    PAF (paroxysmal atrial fibrillation) ([address]     Hypercholesteremia 07/30/2019    Chronic bronchitis ([address] 05/21/2019    Nasal congestion 05/21/2019    Chronic, continuous use of opioids 04/15/2019    Steal syndrome of dialysis vascular access (HCC)     SOB (shortness of breath) on exertion 12/24/2018    Anemia of chronic disease 11/12/2018    Pulmonary edema 11/09/2018    Fluid overload 11/09/2018    Renovascular hypertension     Mixed hyperlipidemia     Cigarette nicotine dependence in remission     Neuromuscular disorder ([facility]     Acute diastolic CHF (congestive heart failure) ([address] 03/18/2018    Hemodialysis-associated hypotension 11/22/2017    ESRD (end stage renal disease) on dialysis ([address] 11/22/2017    Mucus plugging of bronchi     Nonrheumatic aortic valve stenosis     Pleural effusion on left     Chronic anemia     History of CVA (cerebrovascular accident)     Type 2 diabetes mellitus without complication, without long-term current use of insulin ([facility])     ZAINAB (obstructive sleep apnea)     Tobacco abuse 05/21/2017    CVA (cerebral vascular accident) ([address] 05/21/2017    Angina, class IV ([address]     Dyspnea     Gastroparesis due to DM ([address]     Biliary colic 56/42/5240    Symptomatic cholelithiasis 01/04/2017    Degeneration of lumbar or lumbosacral intervertebral disc 03/18/2016    Lumbar radiculopathy 03/18/2016    Lumbosacral spondylosis without myelopathy 03/18/2016    ZAINAB on CPAP 02/11/2016    Obesity (BMI 30-39. 9)     PNA (pneumonia) 03/28/2015    Depression with anxiety 06/05/2014    Passive smoke exposure 05/30/2014    Diabetic neuropathy ([address] 11/14/2013    Claudication in peripheral vascular disease ([address] 06/26/2013    Bilateral hilar adenopathy syndrome 06/03/2013    Sepsis without acute organ dysfunction ([address] 12/25/2012       ASSESSMENT AND PLAN   #ESRD:On dialysis TTS at [facility]  Prior target weight of 102 kg but after recent hospitalization he was down to 99 kg ( outpatient )  -Has working left [facility]. Prior fistula with dialysis associated steal syndrome and he had severe needle phobia so it was not used and ligated  #Shortness of breath:  Recent pneumonia. Having fevers at home and WBC elevated  -He was compliant with dialysis and he is below prior target weight with post dialysis weight of 99 kg on 12/4; not likley to reach today  -CXR with unchanged left effusion and worsening right patchy airspace disease, possible pneumonia. Possible pulmonary edema in setting of hypertension and systolic heart failure.     #Anemia of chronic disease:  Getting DAVON at dialysis but with recent admission and frequent lab draws he is below baseline   #Hypertension:  Range improving

## 2022-12-05 NOTE — PROGRESS NOTES
Comprehensive Nutrition Assessment    Type and Reason for Visit:  Initial    Nutrition Recommendations/Plan:   Continue Regular diet, recommend not using salt packet to patient (patient agreeable)  Offer nutrition supplement if po intake less than 50%  Monitor need for education  Will monitor nutritional adequacy, nutrition-related labs, weights, BMs, and clinical progress      Malnutrition Assessment:  Malnutrition Status:  No malnutrition (12/05/22 5367)      Nutrition Assessment:    Patient admitted with acute on chronic respiratory failure. Currently on a regular diet. Patient just received tray at time of visit, was very hungry. HD this am.  Also requiring bipap intermittently this am.  RD encouraged patient not to use salt packet, patient agreeable. Patient stated asking for regular diet due to restricted diet last admission but was very difficult to order. CHF diet education consult ordered per protocol. Patient very focused on eating lunch since just getting back from HD. Will monitor more appropriate time for education. Will monitor nutrition progression and goals of care. Nutrition Related Findings:    +1 pitting edema bilateral legs; no BM yet noted this admission Wound Type: None       Current Nutrition Intake & Therapies:    Average Meal Intake: Unable to assess  Average Supplements Intake: Unable to assess  ADULT DIET; Regular    Anthropometric Measures:  Height: 5' 9\" (175.3 cm)  Ideal Body Weight (IBW): 160 lbs (73 kg)       Current Body Weight: 231 lb (104.8 kg),   IBW. Weight Source: Bed Scale  Current BMI (kg/m2): 34.1                          BMI Categories: Obese Class 1 (BMI 30.0-34. 9)    Estimated Daily Nutrient Needs:  Energy Requirements Based On: Kcal/kg  Weight Used for Energy Requirements: Ideal  Energy (kcal/day): 4234-2666 (25-30 kcal/72.7 kg)  Weight Used for Protein Requirements: Ideal  Protein (g/day): 110-131 (1.5-1.8 g/72.7 kg)  Method Used for Fluid Requirements: 1 ml/kcal  Fluid (ml/day):      Nutrition Diagnosis:   Increased nutrient needs related to increase demand for energy/nutrients, impaired respiratory function as evidenced by dialysis (on 4 liters O2 nasal cannula/bipap)    Nutrition Interventions:   Food and/or Nutrient Delivery: Continue Current Diet  Nutrition Education/Counseling: Education not appropriate (monitor need)  Coordination of Nutrition Care: Continue to monitor while inpatient  Plan of Care discussed with: patient    Goals:     Goals: PO intake 50% or greater       Nutrition Monitoring and Evaluation:      Food/Nutrient Intake Outcomes: Food and Nutrient Intake  Physical Signs/Symptoms Outcomes: Nutrition Focused Physical Findings, Biochemical Data    Discharge Planning:     Too soon to determine     CARMEN, [address] [facility], [address] [place] [address],   Contact: [phone]

## 2022-12-05 NOTE — PROGRESS NOTES
12/04/22 2021   NIV Type   Equipment Type v60   Mode Bilevel   Mask Type Full face mask   Mask Size Medium   Settings/Measurements   IPAP 16 cmH20   CPAP/EPAP 8 cmH2O   Vt (Measured) 601 mL   Rate Ordered 16   Resp 26   FiO2  50 %   Minute Volume (L/min) 15.6 Liters   Mask Leak (lpm) 14 lpm   Comfort Level Good   Using Accessory Muscles No   Patient's Home Machine No

## 2022-12-05 NOTE — PROGRESS NOTES
[facility] Medicine Services  PROGRESS NOTE    Patient Name: Alex Peres  : 1962  MRN: [id number]    12/05/22 0845   NIV Type   Equipment Type v60   Mode Bilevel   Mask Type Nasal mask   Mask Size Medium   Settings/Measurements   IPAP 16 cmH20   CPAP/EPAP 8 cmH2O   Vt (Measured) 754 mL   Rate Ordered 16   Resp 28   FiO2  50 %   I Time/ I Time % 1.05 s   Minute Volume (L/min) 21.1 Liters   Mask Leak (lpm) 31 lpm   Comfort Level Good   Using Accessory Muscles No   SpO2 100   Oxygen Therapy/Pulse Ox   O2 Therapy Oxygen   $Oxygen $Daily Charge   O2 Device Nasal cannula   $Pulse Oximeter $Spot check (multiple/continuous) Cameron Casey MD  08/20/22

## 2022-12-05 NOTE — PROGRESS NOTES
Hospitalist Progress Note      PCP: Pcp No    Date of Admission: 12/4/2022    Chief Complaint: [address] Course: reviewed     Subjective: sob improved, seen in HD(HD nurse still noted extra fluid to move removed but didn't due to cramping concerns)       Medications:  Reviewed    Infusion Medications    sodium chloride      dextrose       Scheduled Medications    apixaban  2.5 mg Oral BID    calcium acetate  1 capsule Oral TID WC    clopidogrel  75 mg Oral Daily    DULoxetine  30 mg Oral Daily    isosorbide dinitrate  20 mg Oral TID    metoprolol succinate  100 mg Oral BID    pantoprazole  40 mg Oral QAM AC    pravastatin  40 mg Oral Daily    QUEtiapine  50 mg Oral Nightly    sodium chloride flush  5-40 mL IntraVENous 2 times per day    insulin lispro  0-4 Units SubCUTAneous TID WC    insulin lispro  0-4 Units SubCUTAneous Nightly     PRN Meds: calcium carbonate, gabapentin, sodium chloride flush, sodium chloride, ondansetron **OR** ondansetron, polyethylene glycol, acetaminophen **OR** acetaminophen, glucose, dextrose bolus **OR** dextrose bolus, glucagon (rDNA), dextrose, oxyCODONE-acetaminophen, labetalol, labetalol, ipratropium-albuterol      Intake/Output Summary (Last 24 hours) at 12/5/2022 1235  Last data filed at 12/5/2022 0429  Gross per 24 hour   Intake --   Output 100 ml   Net -100 ml       Physical Exam Performed:    BP (!) 153/72   Pulse 99   Temp 97.7 °F (36.5 °C) (Axillary)   Resp 28   Ht 5' 9\" (1.753 m)   Wt 231 lb (104.8 kg)   SpO2 100%   BMI 34.11 kg/m²     General appearance: mild distress, appears stated age and cooperative. HEENT: Pupils equal, round, and reactive to light. Conjunctivae/corneas clear. Neck: Supple, with full range of motion. No jugular venous distention. Trachea midline. Respiratory:  incd respiratory effort. Clear to auscultation, bilaterally without Rales/Wheezes/Rhonchi.   Cardiovascular: Regular rate and rhythm with normal S1/S2 without murmurs, rubs or gallops. Abdomen: Soft, non-tender, non-distended with normal bowel sounds. Musculoskeletal: No clubbing, cyanosis or edema bilaterally. Full range of motion without deformity. Skin: Skin color, texture, turgor normal.  No rashes or lesions. Neurologic:  Neurovascularly intact without any focal sensory/motor deficits. Cranial nerves: II-XII intact, grossly non-focal.  Psychiatric: Alert and oriented, thought content appropriate, normal insight  Capillary Refill: Brisk, 3 seconds, normal   Peripheral Pulses: +2 palpable, equal bilaterally       Labs:   Recent Labs     12/02/22 2354 12/04/22  1200   WBC 10.9 13.4*   HGB 9.0* 8.6*   HCT 26.7* 26.2*    326     Recent Labs     12/02/22 2354 12/04/22  1200 12/05/22  0600    135* 134*   K 3.8 4.3 4.9    98* 97*   CO2 27 23 17*   BUN 25* 41* 54*   CREATININE 4.0* 6.1* 7.1*   CALCIUM 9.0 8.6 8.6     Recent Labs     12/02/22  2354 12/04/22  1200   AST 12* 8*   ALT <5* 7*   BILITOT <0.2 0.3   ALKPHOS 63 66     No results for input(s): INR in the last 72 hours. Recent Labs     12/02/22 2354 12/04/22  1200   TROPONINI 0.13* 0.14*       Urinalysis:      Lab Results   Component Value Date/Time    NITRU Negative 12/04/2022 01:19 PM    WBCUA 6-9 12/04/2022 01:19 PM    BACTERIA 3+ 05/29/2022 12:51 PM    RBCUA 0-2 12/04/2022 01:19 PM    BLOODU TRACE-INTACT 12/04/2022 01:19 PM    SPECGRAV 1.020 12/04/2022 01:19 PM    GLUCOSEU 500 12/04/2022 01:19 PM       Radiology:  XR CHEST PORTABLE   Final Result   Worsening aeration in the right lower lung zone, otherwise no significant   change compared with 12/02/2022.              IP CONSULT TO HOSPITALIST  IP CONSULT TO HEART FAILURE NURSE/COORDINATOR  IP CONSULT TO DIETITIAN  IP CONSULT TO NEPHROLOGY    Assessment/Plan:    Active Hospital Problems    Diagnosis     Acute on chronic combined systolic and diastolic heart failure (HCC) [I50.43]      Priority: High    DM2 (diabetes mellitus, type 2) ([facility] [E11.9] Priority: High    Acute on chronic respiratory failure (HCC) [J96.20]      Priority: Medium    History of transcatheter aortic valve replacement (TAVR) [Z95.2]      Priority: Medium    Obesity (BMI 30-39. 9) [E66.9]        Acute on chronic respiratory failure with Hypoxia: Recurrent presentation, just here 2 days prior. Frequent admissions for fluid overload and missed dialysis. However, reports compliance with HD this week. Recent treatment for Pneumonia. Currently, he does have mild leukocytosis but lacking other features of pneumonia. - checked Procalcitonin and elevated compared to prior(chronically elevated)  - continued treatment for presumed volume overload/CHF with IV Lasix and HD. Nephrology consulted. May need input from Cardiology and Pulmonology (not yet ordered). He does report using NIPPV at home (unclear if CPAP or BIPAP). Wonder if this may need to be adjusted. Acute on chronic combined systolic and diastolic heart failure: Known LVEF 20-25%. Continued IV diuresis and HD. Monitor I/O, weights, BMP. Medical management. Diabetes Mellitus, Type 2: Diet Controlled. Recheck HbA1c. Low SSI if needed. Morbid Obesity -  With Body mass index is 32.49 kg/m². Complicating assessment and treatment. Placing patient at risk for multiple co-morbidities as well as early death and contributing to the patient's presentation. Paroxysmal Atrial Fibrillation: Stable. Continue Eliquis 2.5 mg BID     DVT Prophylaxis: Eliquis  Diet: ADULT DIET;  Regular  Code Status: Full Code  PT/OT Eval Status: not ordered    Dispo - pending improved resp status, repeat am cxr, will need to consider pulm/cards eval also, ?by wed/thur    Appropriate for A1 Discharge Unit: Jazmín Chavez MD

## 2022-12-05 NOTE — PROGRESS NOTES
12/05/22 0440   NIV Type   Equipment Type v60   Mode Bilevel   Mask Type Full face mask   Mask Size Medium   Settings/Measurements   IPAP 16 cmH20   CPAP/EPAP 8 cmH2O   Vt (Measured) 675 mL   Rate Ordered 12   Resp 21   FiO2  50 %   Minute Volume (L/min) 13.5 Liters   Mask Leak (lpm) 27 lpm   Comfort Level Good   Using Accessory Muscles No   SpO2 100   Patient's Home Machine No

## 2022-12-05 NOTE — PROGRESS NOTES
Treatment time: 3.5 Hours  Net UF: 3000 ML    Pre weight: 103.7 Kg  Post weight: 100.7 kg  EDW: 99 kg    Access used: Left neck TDC   Access function: Good with  ml/min    Medications or blood products given: None    Regular outpatient schedule: MWF [address]    Summary of response to treatment: Tolerated tx well, BP elevated t/o. Required bipap as soon as pt arrived in [place], O2 sats 98% on 4L at that time, resp therapy called to place bipap. When ending tx pt wanted bipap off and was able to tolerate O2 at 4L per NC without difficulty. Crit Line:  Pre tx HCT   28.3   Post tx HCT  29.8    HCT#1   30.2         minus HCT#2    29.8    equals   1.4  shows    Refill present  Max %BV tolerated  -11.0    Copy of dialysis treatment record placed in chart, to be scanned into electronic record. Report given to Kanchan Gustafson RN.

## 2022-12-05 NOTE — PLAN OF CARE
Problem: Safety - Adult  Goal: Free from fall injury  12/5/2022 0516 by Yissel Marino RN  Outcome: Progressing     Problem: ABCDS Injury Assessment  Goal: Absence of physical injury  12/5/2022 0516 by Yissel Marino RN  Outcome: Progressing

## 2022-12-06 ENCOUNTER — APPOINTMENT (OUTPATIENT)
Dept: GENERAL RADIOLOGY | Age: 52
DRG: 280 | End: 2022-12-06
Payer: MEDICARE

## 2022-12-06 PROBLEM — J98.11 COMPRESSION ATELECTASIS: Status: ACTIVE | Noted: 2022-12-06

## 2022-12-06 LAB
ESTIMATED AVERAGE GLUCOSE: 154.2 MG/DL
GLUCOSE BLD-MCNC: 139 MG/DL (ref 70–99)
GLUCOSE BLD-MCNC: 144 MG/DL (ref 70–99)
GLUCOSE BLD-MCNC: 146 MG/DL (ref 70–99)
GLUCOSE BLD-MCNC: 161 MG/DL (ref 70–99)
HBA1C MFR BLD: 7 %
PERFORMED ON: ABNORMAL

## 2022-12-06 PROCEDURE — 71046 X-RAY EXAM CHEST 2 VIEWS: CPT

## 2022-12-06 PROCEDURE — 94761 N-INVAS EAR/PLS OXIMETRY MLT: CPT

## 2022-12-06 PROCEDURE — 94660 CPAP INITIATION&MGMT: CPT

## 2022-12-06 PROCEDURE — 6370000000 HC RX 637 (ALT 250 FOR IP): Performed by: INTERNAL MEDICINE

## 2022-12-06 PROCEDURE — 2700000000 HC OXYGEN THERAPY PER DAY

## 2022-12-06 PROCEDURE — 99222 1ST HOSP IP/OBS MODERATE 55: CPT | Performed by: INTERNAL MEDICINE

## 2022-12-06 PROCEDURE — 2060000000 HC ICU INTERMEDIATE R&B

## 2022-12-06 PROCEDURE — 2500000003 HC RX 250 WO HCPCS: Performed by: INTERNAL MEDICINE

## 2022-12-06 PROCEDURE — 2580000003 HC RX 258: Performed by: INTERNAL MEDICINE

## 2022-12-06 RX ADMIN — OXYCODONE AND ACETAMINOPHEN 1 TABLET: 7.5; 325 TABLET ORAL at 21:08

## 2022-12-06 RX ADMIN — METOPROLOL SUCCINATE 100 MG: 50 TABLET, EXTENDED RELEASE ORAL at 09:07

## 2022-12-06 RX ADMIN — SODIUM CHLORIDE, PRESERVATIVE FREE 10 ML: 5 INJECTION INTRAVENOUS at 09:21

## 2022-12-06 RX ADMIN — APIXABAN 2.5 MG: 2.5 TABLET, FILM COATED ORAL at 21:08

## 2022-12-06 RX ADMIN — ISOSORBIDE DINITRATE 20 MG: 20 TABLET ORAL at 09:07

## 2022-12-06 RX ADMIN — PRAVASTATIN SODIUM 40 MG: 40 TABLET ORAL at 09:07

## 2022-12-06 RX ADMIN — QUETIAPINE FUMARATE 50 MG: 25 TABLET ORAL at 21:09

## 2022-12-06 RX ADMIN — OXYCODONE AND ACETAMINOPHEN 1 TABLET: 7.5; 325 TABLET ORAL at 09:09

## 2022-12-06 RX ADMIN — DULOXETINE HYDROCHLORIDE 30 MG: 30 CAPSULE, DELAYED RELEASE ORAL at 09:07

## 2022-12-06 RX ADMIN — SODIUM CHLORIDE, PRESERVATIVE FREE 5 ML: 5 INJECTION INTRAVENOUS at 21:11

## 2022-12-06 RX ADMIN — ISOSORBIDE DINITRATE 20 MG: 20 TABLET ORAL at 15:42

## 2022-12-06 RX ADMIN — APIXABAN 2.5 MG: 2.5 TABLET, FILM COATED ORAL at 09:07

## 2022-12-06 RX ADMIN — CALCIUM ACETATE 667 MG: 667 CAPSULE ORAL at 09:07

## 2022-12-06 RX ADMIN — ISOSORBIDE DINITRATE 20 MG: 20 TABLET ORAL at 21:09

## 2022-12-06 RX ADMIN — CLOPIDOGREL BISULFATE 75 MG: 75 TABLET ORAL at 09:07

## 2022-12-06 RX ADMIN — CALCIUM ACETATE 667 MG: 667 CAPSULE ORAL at 12:13

## 2022-12-06 RX ADMIN — METOPROLOL SUCCINATE 100 MG: 50 TABLET, EXTENDED RELEASE ORAL at 21:08

## 2022-12-06 RX ADMIN — CALCIUM ACETATE 667 MG: 667 CAPSULE ORAL at 17:28

## 2022-12-06 ASSESSMENT — ENCOUNTER SYMPTOMS
CHEST TIGHTNESS: 1
COUGH: 1
BACK PAIN: 0
SHORTNESS OF BREATH: 1
VOMITING: 0

## 2022-12-06 ASSESSMENT — PAIN SCALES - GENERAL
PAINLEVEL_OUTOF10: 8
PAINLEVEL_OUTOF10: 8

## 2022-12-06 ASSESSMENT — PAIN DESCRIPTION - LOCATION: LOCATION: BACK

## 2022-12-06 NOTE — ACP (ADVANCE CARE PLANNING)
Advance Care Planning     General Advance Care Planning (ACP) Conversation    Date of Conversation: 12/4/2022  Conducted with: Patient with Decision Making Capacity    Healthcare Decision Maker:    Primary Decision Maker: Crystal Ann - Spouse - [phone]    Secondary Decision Maker: Rizwan Tang - Brother/Sister - [phone]  Click here to complete Healthcare Decision Makers including selection of the Healthcare Decision Maker Relationship (ie \"Primary\"). Today we documented Decision Maker(s) consistent with Legal Next of Kin hierarchy.     Content/Action Overview:  Has NO ACP documents/care preferences - information provided, considering goals and options  Reviewed DNR/DNI and patient elects Full Code (Attempt Resuscitation)    Length of Voluntary ACP Conversation in minutes:  <16 minutes (Non-Billable)    Emilia Shelby RN

## 2022-12-06 NOTE — PROGRESS NOTES
Hospitalist Progress Note      PCP: Pcp No    Date of Admission: 12/4/2022    Chief Complaint: [address] Course: reviewed      Subjective: sob improved, remains on bipap, no overnight events    Medications:  Reviewed    Infusion Medications    sodium chloride      dextrose       Scheduled Medications    apixaban  2.5 mg Oral BID    calcium acetate  1 capsule Oral TID WC    clopidogrel  75 mg Oral Daily    DULoxetine  30 mg Oral Daily    isosorbide dinitrate  20 mg Oral TID    metoprolol succinate  100 mg Oral BID    pantoprazole  40 mg Oral QAM AC    pravastatin  40 mg Oral Daily    QUEtiapine  50 mg Oral Nightly    sodium chloride flush  5-40 mL IntraVENous 2 times per day    insulin lispro  0-4 Units SubCUTAneous TID WC    insulin lispro  0-4 Units SubCUTAneous Nightly     PRN Meds: calcium carbonate, gabapentin, sodium chloride flush, sodium chloride, ondansetron **OR** ondansetron, polyethylene glycol, acetaminophen **OR** acetaminophen, glucose, dextrose bolus **OR** dextrose bolus, glucagon (rDNA), dextrose, oxyCODONE-acetaminophen, labetalol, labetalol, ipratropium-albuterol      Intake/Output Summary (Last 24 hours) at 12/6/2022 0931  Last data filed at 12/5/2022 2127  Gross per 24 hour   Intake 222 ml   Output 100 ml   Net 122 ml       Physical Exam Performed:    /75   Pulse 60   Temp 98 °F (36.7 °C) (Axillary)   Resp 16   Ht 5' 9\" (1.753 m)   Wt 238 lb 12.1 oz (108.3 kg)   SpO2 100%   BMI 35.26 kg/m²     General appearance:  no distress, appears stated age and cooperative. HEENT: Pupils equal, round, and reactive to light. Conjunctivae/corneas clear. Neck: Supple, with full range of motion. No jugular venous distention. Trachea midline. Respiratory:  improved respiratory effort. Clear to auscultation, bilaterally without Rales/Wheezes/Rhonchi. Cardiovascular: Regular rate and rhythm with normal S1/S2 without murmurs, rubs or gallops.   Abdomen: Soft, non-tender, non-distended with normal bowel sounds. Musculoskeletal: No clubbing, cyanosis or edema bilaterally. Full range of motion without deformity. Skin: Skin color, texture, turgor normal.  No rashes or lesions. Neurologic:  Neurovascularly intact without any focal sensory/motor deficits. Cranial nerves: II-XII intact, grossly non-focal.  Psychiatric: Alert and oriented, thought content appropriate, normal insight  Capillary Refill: Brisk, 3 seconds, normal   Peripheral Pulses: +2 palpable, equal bilaterally       Labs:   Recent Labs     12/04/22  1200   WBC 13.4*   HGB 8.6*   HCT 26.2*        Recent Labs     12/04/22  1200 12/05/22  0600 12/05/22  0910   * 134*  --    K 4.3 4.9  --    CL 98* 97*  --    CO2 23 17*  --    BUN 41* 54*  --    CREATININE 6.1* 7.1*  --    CALCIUM 8.6 8.6  --    PHOS  --   --  4.1     Recent Labs     12/04/22  1200   AST 8*   ALT 7*   BILITOT 0.3   ALKPHOS 66     No results for input(s): INR in the last 72 hours. Recent Labs     12/04/22  1200   TROPONINI 0.14*       Urinalysis:      Lab Results   Component Value Date/Time    NITRU Negative 12/04/2022 01:19 PM    WBCUA 6-9 12/04/2022 01:19 PM    BACTERIA 3+ 05/29/2022 12:51 PM    RBCUA 0-2 12/04/2022 01:19 PM    BLOODU TRACE-INTACT 12/04/2022 01:19 PM    SPECGRAV 1.020 12/04/2022 01:19 PM    GLUCOSEU 500 12/04/2022 01:19 PM       Radiology:  XR CHEST (2 VW)   Final Result   Stable left pleural effusion with associated airspace change in the left   lower lobe. Improved aeration in the right lung. XR CHEST PORTABLE   Final Result   Worsening aeration in the right lower lung zone, otherwise no significant   change compared with 12/02/2022.              IP CONSULT TO HOSPITALIST  IP CONSULT TO HEART FAILURE NURSE/COORDINATOR  IP CONSULT TO DIETITIAN  IP CONSULT TO NEPHROLOGY    Assessment/Plan:    Active Hospital Problems    Diagnosis     Acute on chronic combined systolic and diastolic heart failure ([facility] [I50.43]      Priority: High DM2 (diabetes mellitus, type 2) ([facility] [E11.9]      Priority: High    Acute on chronic respiratory failure (HCC) [J96.20]      Priority: Medium    History of transcatheter aortic valve replacement (TAVR) [Z95.2]      Priority: Medium    Obesity (BMI 30-39. 9) [E66.9]        Acute on chronic respiratory failure with Hypoxia: Recurrent presentation, just here 2 days prior. Frequent admissions for fluid overload and missed dialysis. However, reports compliance with HD this week. Recent treatment for Pneumonia. Currently, he does have mild leukocytosis but lacking other features of pneumonia. - checked Procalcitonin and elevated compared to prior(chronically elevated)  - continued treatment for presumed volume overload/CHF with IV Lasix and HD. Nephrology consulted. May need input from Cardiology and Pulmonology (not yet ordered). He does report using NIPPV at home (unclear if CPAP or BIPAP). Wonder if this may need to be adjusted. -repeat cxr 12/6 noted left effusion, pulm consulted    Acute on chronic combined systolic and diastolic heart failure: Known LVEF 20-25%. Continued IV diuresis and HD. Monitor I/O, weights, BMP. Medical management. Diabetes Mellitus, Type 2: Diet Controlled. Recheck HbA1c. Low SSI if needed. Morbid Obesity -  With Body mass index is 32.49 kg/m². Complicating assessment and treatment. Placing patient at risk for multiple co-morbidities as well as early death and contributing to the patient's presentation. Paroxysmal Atrial Fibrillation: Stable. Continue Eliquis 2.5 mg BID     DVT Prophylaxis: Eliquis  Diet: ADULT DIET;  Regular  Code Status: Full Code  PT/OT Eval Status: not ordered     Dispo - pending improved resp status, remove addit fluid per nephro?, pulm consulted(?need for thoracentesis),  ?by thur       Appropriate for A1 Discharge Unit: No      Bennett Pereira MD

## 2022-12-06 NOTE — CONSULTS
INPATIENT PULMONARY CRITICAL CARE CONSULT NOTE      Chief Complaint/Referring Provider:  Patient is being seen at the request of Dr. Yessenia Davis for a consultation for persistent respiratory failure, elevated Pro-Russ, need for antibiotics question also left pleural effusion     Presenting HPI: Patient admitted to hospital because of increasing shortness of breath    As per the admitting provider-52 y.o. male who presented to [facility] with SOB. PMHx significant for ESRD, Chronic Systolic CHF, s/p TAVR, CAD, HTN, DM2, COPD. He has frequent hospitalizations d/t respiratory issues and non-compliance. He has a Care Plan in place. He was recently admitted here last week and treated for Pneumonia. He continues HD as scheduled as an outpatient. He visited the ED on 12/2/22 with SOB, although symptoms improved with IV Lasix. He felt well at home on Saturday. However, he now presents after awakening with worsening SOB. He took off his CPAP and tried using higher amount of O2 without improvement. Symptoms progressively worsened and he presented to the ED with respiratory distress. Started on BIPAP support. CXR did show some worsening patchy airspace disease on the right base, stable fluid and airspace disease on the left relative to 2 days prior.    Patient continues to be symptomatic and a pulmonary consult requested for the same  Patient states that he came because of increasing shortness of breath and left-sided chest pressure, patient did not have too much of expectoration or wheezing per se, patient states that he needs oxygen, patient states that when he does not get the oxygen he becomes panicky and he starts having more shortness of breath, patient did not have any fever or chills, patient states that he is compliant with his dialysis and patient states that his dry weight is maintained, patient did not have any indiscretion for that, patient states that his BiPAP machine is not working and he needs a new BiPAP machine, patient did not have any palpitations or diaphoresis, patient does not have any increasing abdominal pain nausea vomiting, no increasing leg edema, no confusion lethargy, no other pertinent review of system of concern  Patient had similar hospitalization a few months back and patient underwent thoracentesis at that time and 1.8 L of straw-colored fluid was removed at that time; no other issues     Patient Active Problem List    Diagnosis Date Noted    Hypotension due to drugs 11/25/2017    Acute on chronic combined systolic and diastolic heart failure ([facility]     Ischemic cardiomyopathy     Dyslipidemia     DM2 (diabetes mellitus, type 2) ([address] 03/04/2014    Bicuspid aortic valve 06/03/2013    CHF (congestive heart failure) ([address] 05/14/2013    CAD (coronary artery disease) 05/14/2013    PVD (peripheral vascular disease) ([address] 05/14/2013    Compression atelectasis 12/06/2022    Acute on chronic respiratory failure ([address] 12/04/2022    Acute pneumonia 11/25/2022    Hypervolemia 10/19/2022    Pericardial effusion 09/28/2022    HFrEF (heart failure with reduced ejection fraction) ([facility] 09/24/2022    Acute respiratory failure with hypoxia and hypercapnia ([facility] 09/23/2022    Hypertensive emergency 07/17/2022    SOB (shortness of breath) 07/17/2022    Altered mental status     Hypoglycemia 05/29/2022    Morbid obesity with BMI of 40.0-44.9, adult ([address] 04/26/2022    Former smoker 04/19/2022    Cardiomyopathy ([facility] 04/19/2022    History of transcatheter aortic valve replacement (TAVR) 10/19/2019    Uncontrolled hypertension 11/14/2013    Asthma-COPD overlap syndrome ([address] 05/14/2013    Respiratory failure ([address] 04/18/2022    Pulmonary edema with diastolic CHF, NYHA class 3 ([address] 03/17/2022    [place]-rectal abscess     Somnolence     Atrial flutter (HCC)     SIRS (systemic inflammatory response syndrome) ([address] 02/21/2022    NSTEMI (non-ST elevated myocardial infarction) ([facility] 01/12/2022    Acute respiratory failure with hypercapnia ([address] 11/30/2021    Acute pulmonary edema ([address] 11/30/2021    Grade II diastolic dysfunction 47/17/5568    Shock circulatory ([address] 11/30/2021    Smoker 11/30/2021    Normocytic normochromic anemia 11/30/2021    Respiratory failure with hypoxia ([address] 11/29/2021    Speech problem     Urinary tract infection with hematuria     Acute CVA (cerebrovascular accident) ([address] 09/07/2021    Acute hypoxemic respiratory failure ([address] 08/12/2021    Type 2 diabetes mellitus with hyperglycemia ([address] 06/27/2021    Acute encephalopathy 06/27/2021    Cellulitis and abscess of hand 04/24/2021    Iliac artery occlusion, right (HCC)     Cellulitis of right foot 01/29/2021    Peripheral vascular occlusive disease ([address] 01/02/2021    Ataxia     Weakness of both lower extremities 12/30/2020    Sinus bradycardia 12/30/2020    Sinus pause     GBS (Guillain-[place] syndrome) ([facility])     Bilateral leg weakness 12/04/2020    Bradycardia 10/19/2019    Syncope and collapse 10/19/2019    PAF (paroxysmal atrial fibrillation) ([address]     Hypercholesteremia 07/30/2019    Chronic bronchitis ([address] 05/21/2019    Nasal congestion 05/21/2019    Chronic, continuous use of opioids 04/15/2019    Steal syndrome of dialysis vascular access (HCC)     SOB (shortness of breath) on exertion 12/24/2018    Anemia of chronic disease 11/12/2018    Pulmonary edema 11/09/2018    Fluid overload 11/09/2018    Renovascular hypertension     Mixed hyperlipidemia     Cigarette nicotine dependence in remission     Neuromuscular disorder ([address]     Acute diastolic CHF (congestive heart failure) ([address] 03/18/2018    Hemodialysis-associated hypotension 11/22/2017    ESRD (end stage renal disease) on dialysis ([address] 11/22/2017    Mucus plugging of bronchi     Nonrheumatic aortic valve stenosis     Pleural effusion on left     Chronic anemia     History of CVA (cerebrovascular accident)     Type 2 diabetes mellitus without complication, without long-term current use of insulin ([address]     ZAINAB (obstructive sleep apnea)     Tobacco abuse 05/21/2017    CVA (cerebral vascular accident) ([address] 05/21/2017    Angina, class IV ([address]     Dyspnea     Gastroparesis due to DM ([address]     Biliary colic 62/94/4148    Symptomatic cholelithiasis 01/04/2017    Degeneration of lumbar or lumbosacral intervertebral disc 03/18/2016    Lumbar radiculopathy 03/18/2016    Lumbosacral spondylosis without myelopathy 03/18/2016    ZAINAB on CPAP 02/11/2016    Obesity (BMI 30-39. 9)     PNA (pneumonia) 03/28/2015    Depression with anxiety 06/05/2014    Passive smoke exposure 05/30/2014    Diabetic neuropathy ([address] 11/14/2013    Claudication in peripheral vascular disease ([address] 06/26/2013    Bilateral hilar adenopathy syndrome 06/03/2013    Sepsis without acute organ dysfunction ([address] 12/25/2012       Past Medical History:   Diagnosis Date    Ambulatory dysfunction     walker for long distances, SOB with distance    Aortic stenosis     echo 2017    Arthritis     hands and hips    Asthma     Bilateral hilar adenopathy syndrome 6/3/2013    CAD (coronary artery disease)     Dr. Salvador Carrillo [facility]) 04/19/2019    EF= 43%    CHF (congestive heart failure) ([address]     Chronic pain     COPD (chronic obstructive pulmonary disease) ([address]     pulmonology Dr. Mark Sierra    Depression     Diabetes mellitus ([address]     borderline    Difficult intravenous access     Emphysema of lung ([address]     ESRD (end stage renal disease) on dialysis ([address]     MWF    Fear of needles     Gastric ulcer     GERD (gastroesophageal reflux disease)     Heart valve problem     bicuspic valve    Hemodialysis patient ([address]     History of spinal fracture     work incident    Hx of blood clots     Bilateral lower extremities; stents in place    Hyperlipidemia     Hypertension     MI (myocardial infarction) ([address] 2019    has had 9 MIs. 2019 was the last    Neuromuscular disorder ([address]     due to CVA    Numbness and tingling in left arm     from fistula    Pneumonia     PONV (postoperative nausea and vomiting)     Prolonged emergence from general anesthesia     States requires more medication than most people    Sleep apnea     Uses CPAP    Stroke ([facility]     7mm thalamic cva 2017 deficts left side, left side weakness    TIA (transient ischemic attack)     Unspecified diseases of blood and blood-forming organs         Past Surgical History:   Procedure Laterality Date    AORTIC VALVE REPLACEMENT N/A 10/15/2019    TRANSCATHETER AORTIC VALVE REPLACEMENT FEMORAL APPROACH performed by Janet Aguilar MD at [address] [facility] Right 7/2/2019    PERITONEAL DIALYSIS CATHETER REMOVAL performed by Sayra Lux MD at [address] [facility]  2/29/2015    WN    CORONARY ANGIOPLASTY WITH STENT PLACEMENT  05/26/15    CYST REMOVAL  08/14/2013    EXCISION CYSTS, NECK X2 AND ABDOMINAL benign    DIAGNOSTIC CARDIAC CATH LAB PROCEDURE      DIALYSIS FISTULA CREATION Left 10/30/2017    LEFT BRACHIAL CEPHALIC FISTULA    DIALYSIS FISTULA CREATION Left 3/27/2019    LIGATION  AV FISTULA performed by Nunu Avila MD at [address] [facility], COLON, DIAGNOSTIC      IR TUNNELED [address] 5 YEARS  3/21/2022    IR TUNNELED CATHETER PLACEMENT GREATER THAN 5 YEARS 3/21/2022 [facility]    IR TUNNELED CATHETER PLACEMENT GREATER THAN 5 YEARS  4/21/2022    IR TUNNELED CATHETER PLACEMENT GREATER THAN 5 YEARS 4/21/2022 [facility]    IR TUNNELED CATHETER PLACEMENT GREATER THAN 5 YEARS  4/26/2022    IR TUNNELED CATHETER PLACEMENT GREATER THAN 5 YEARS 4/26/2022 [facility]    IR TUNNELED CATHETER PLACEMENT GREATER THAN 5 YEARS  6/23/2022    IR TUNNELED CATHETER PLACEMENT GREATER THAN 5 YEARS 6/23/2022 [facility]    OTHER SURGICAL HISTORY  02/01/2017    laparoscopic cholecystectomy with intraoperative cholangiogram    OTHER SURGICAL HISTORY  2018    PORT PLACEMENT  - vas cath    OTHER SURGICAL HISTORY Bilateral 2018    laprascopic peritoneal dialysis catheter placement    OTHER SURGICAL HISTORY Right 2018    peritoneal dialysis port placed on right side of abdomen    OTHER SURGICAL HISTORY  2019    PTA/Stenting R External Iliac artery    WA LAP INSERTION TUNNELED INTRAPERITONEAL CATHETER N/A 2018    LAPAROSCOPIC PERITONEAL DIALYSIS CATHETER REPLACEMENT performed by Hakan Izaguirre MD at [address] [facility] [address]22    PERINEAL ABCESS DRAINAGE performed by Hakan Izaguirre MD at [address] [place] [address] N/A 10/2/2022    THORACENTESIS ULTRASOUND performed by Cal Petit MD at [address]  2016    UPPER GASTROINTESTINAL ENDOSCOPY  2017    possible candida, otherwise normal appearing    VASCULAR SURGERY  aprx 2 years ago    2 stents placed, each side of groin        Family History   Problem Relation Age of Onset    Diabetes Mother     Heart Disease Father     Kidney Disease Sister         stage 4-kidney failure    Cancer Sister     Heart Disease Sister     Obesity Sister     Cancer Sister     Heart Disease Sister     Obesity Sister     Alcohol Abuse Brother         Social History     Tobacco Use    Smoking status: Former     Packs/day: 0.50     Years: 33.00     Pack years: 16.50     Types: Cigarettes     Quit date: 2020     Years since quittin.6    Smokeless tobacco: Never    Tobacco comments:     States quit 2021   Substance Use Topics    Alcohol use: Not Currently     Alcohol/week: 0.0 standard drinks     Comment: occ        Allergies   Allergen Reactions    Morphine Nausea And Vomiting               Physical Exam:  Blood pressure (!) 142/74, pulse 84, temperature 97.9 °F (36.6 °C), temperature source Oral, resp.  rate 18, height 5' 9\" (1.753 m), weight 238 lb 12.1 oz (108.3 kg), SpO2 96 %.'     Constitutional: No respiratory distress   HENT: No facial asymmetry no thyromegaly. Eyes:  Conjunctivae are normal. Pupils equal, round, and reactive to light. No scleral icterus. Neck: . No tracheal deviation present. No obvious thyroid mass. Short large neck  Cardiovascular: Normal rate, regular rhythm, normal heart sounds. No right ventricular heave. some lower extremity edema. Pulmonary/Chest: No wheezes. minimal  bilateral rales. Chest wall is not dull to percussion. No accessory muscle usage or stridor. Decreased breath sound intensity(L>R)  Abdominal: Soft. Bowel sounds present. No distension or hernia. No tenderness. Obese  Musculoskeletal: No cyanosis. No clubbing. No obvious joint deformity. Lymphadenopathy: No cervical or supraclavicular adenopathy. Skin: Skin is warm and dry. No rash or nodules on the exposed extremities. Psychiatric: Normal reaction ;no anxiety   Neurologic Alert and communicative with no focal cranial deficits      Results:  CBC:   Recent Labs     12/04/22  1200   WBC 13.4*   HGB 8.6*   HCT 26.2*   MCV 90.7        BMP:   Recent Labs     12/04/22  1200 12/05/22  0600 12/05/22  0910   * 134*  --    K 4.3 4.9  --    CL 98* 97*  --    CO2 23 17*  --    PHOS  --   --  4.1   BUN 41* 54*  --    CREATININE 6.1* 7.1*  --      LIVER PROFILE:   Recent Labs     12/04/22  1200   AST 8*   ALT 7*   BILITOT 0.3   ALKPHOS 66     PT/INR: No results for input(s): PROTIME, INR in the last 72 hours. APTT: No results for input(s): APTT in the last 72 hours. UA:  Recent Labs     12/04/22  1319   COLORU Yellow   PHUR 8.0   WBCUA 6-9*   RBCUA 0-2   CLARITYU Clear   SPECGRAV 1.020   LEUKOCYTESUR TRACE*   UROBILINOGEN 0.2   BILIRUBINUR Negative   BLOODU TRACE-INTACT*   GLUCOSEU 500*       Imaging:  I have reviewed radiology images personally. XR CHEST (2 VW)   Final Result   Stable left pleural effusion with associated airspace change in the left   lower lobe. Improved aeration in the right lung.          XR CHEST PORTABLE   Final Result   Worsening aeration in the right lower lung zone, otherwise no significant   change compared with 12/02/2022. XR CHEST (2 VW)    Result Date: 12/6/2022  EXAMINATION: TWO XRAY VIEWS OF THE CHEST 12/6/2022 8:31 am COMPARISON: 12/04/2022 rate HISTORY: ORDERING SYSTEM PROVIDED HISTORY: sob, resp failure, f/u cxr TECHNOLOGIST PROVIDED HISTORY: Reason for exam:->sob, resp failure, f/u cxr Reason for Exam: sob, resp failure, f/u cxr FINDINGS: Stable vascular catheter on the left. The heart is enlarged. Prior AVR. Relatively stable volume of a moderate left pleural effusion. Dense airspace opacification in the left lower lung zone. Improved aeration of the right lung. No significant skeletal finding. Stable left pleural effusion with associated airspace change in the left lower lobe. Improved aeration in the right lung. XR CHEST PORTABLE    Result Date: 12/4/2022  EXAMINATION: ONE XRAY VIEW OF THE CHEST 12/4/2022 11:21 am COMPARISON: 12/02/2022 and prior HISTORY: ORDERING SYSTEM PROVIDED HISTORY: SOB TECHNOLOGIST PROVIDED HISTORY: Reason for exam:->SOB Reason for Exam: sob FINDINGS: Left dual lumen catheter tip near the cavoatrial junction. Unchanged moderate left pleural effusion with adjacent patchy opacities. Increased patchy opacification overlying the right lower lung zone. Status post TAVR. Jestine Courts Unchanged mild cardiomegaly. Jestine Courts No pneumothorax. No acute osseous abnormality. Aortic knob calcifications. Worsening aeration in the right lower lung zone, otherwise no significant change compared with 12/02/2022. XR CHEST PORTABLE    Result Date: 12/2/2022  EXAMINATION: ONE XRAY VIEW OF THE CHEST 12/2/2022 9:29 pm COMPARISON: Chest 11/25/2022 HISTORY: ORDERING SYSTEM PROVIDED HISTORY: shortness of breath FINDINGS: The cardiac silhouette is enlarged. Sequela from TAVR. Calcifications involving the aorta reflect atherosclerosis. The mediastinal and hilar silhouettes appear unremarkable. Moderate size left pleural effusion with left basilar consolidation obscuring left hemidiaphragm and left heart margin bowel. Vascular engorgement and cephalization is demonstrated with bilateral peribronchial cuffing and perivascular haziness. No focal consolidation or pleural effusion evident on the right. Unchanged left IJ hemodialysis catheter, tip at the superior cavoatrial junction level. No pneumothorax is seen. No acute osseous abnormality is identified. 1.  Congestive heart failure is most likely given the radiographic findings; pneumonia is also a consideration in areas of consolidation with pleural effusion. Similar appearance to prior study. 2. Calcific atherosclerosis aorta. 3. Cardiomegaly. XR CHEST PORTABLE    Result Date: 11/25/2022  EXAMINATION: ONE XRAY VIEW OF THE CHEST 11/25/2022 9:26 am COMPARISON: Chest x-ray dated 18 October 2022 HISTORY: ORDERING SYSTEM PROVIDED HISTORY: Shortness of Breath TECHNOLOGIST PROVIDED HISTORY: Reason for exam:->Shortness of Breath Reason for Exam: SOB FINDINGS: Moderate left pleural effusion with left basilar airspace disease. Right lung is clear. No pneumothorax. No cardiomegaly. Left-sided central venous catheter with the tip in the superior vena cava. Moderate left pleural effusion with left basilar airspace disease. This could represent atelectasis or pneumonia in the appropriate clinical setting.      VBG -10 days back -   Latest Reference Range & Units Most Recent   pH, Arterial 7.350 - 7.450  7.308 (L)  11/25/22 11:22   pCO2, Arterial 35.0 - 45.0 mmHg 55.0 (H)  11/25/22 11:22   pO2, Arterial 75.0 - 108.0 mmHg 25.6 (LL)  11/25/22 11:22   HCO3, Arterial 21.0 - 29.0 mmol/L 26.9  11/25/22 11:22   TCO2 (calc), Art Not Established mmol/L 28.6  11/25/22 11:22   Base Excess, Arterial -3.0 - 3.0 mmol/L 0.2  11/25/22 11:22   O2 Sat, Arterial >92 % 35.4 (L)  11/25/22 11:22   O2 Content, Arterial Not Established mL/dL 16  12/24/18 03:05 Methemoglobin, Arterial <1.5 % 0.5  11/25/22 11:22   Carboxyhgb, Arterial 0.0 - 1.5 % 1.5  11/25/22 11:22   tHgb, Arterial 13.5 - 17.5 gm/dl 16.4  5/16/13 05:31       Echocardiogram:Sept 2022- Summary   Patient refused use of Definity contrast.   Limited only f/u for LVEF and s/p TAVR. The left ventricular systolic function appears severely reduced with   visually estimated ejection fraction of 20-25%. Poor endocardial visualization. By history, there is a #29 mm Langford Leyda S3 bioprosthetic valve in the   aortic position. The valve appears well seated with no rocking motion. Valve   leaflets are not well-evaluated. Normal Doppler values. No evidence of aortic valve regurgitation. Pericardial effusion, noted darrick-apically where measures up to 1.5 cm,   anteriorly along right ventricle, with exudate. Appears likely trace along   lateral wall. Difficult to discern apically/laterally however, whether   pericardial or tracking pleural effusion. No tamponade physiology. There is a large left pleural effusion. PFT:PFT:2016-INDICATIONS:  COPD. 1.  SPIROMETRY:  The FEV-1 is 1.71 L which is 44% predicted. The FEV-1/FVC  ratio is normal.  Inhaled bronchodilators are given. There is significant  improvement in the FEV-1 at 29%. 2.  LUNG VOLUMES:  Total lung capacity is normal at 6.08 L or 86% predicted. 3.  DIFFUSION CAPACITY:  DLCO is 28.26 mL per minute per mmHg which is 79%  predicted. 4.  FLOW VOLUME LOOP:  Does not show an obvious obstructive pattern. No variable or fixed obstruction pattern on the flow volume loop done in 2016     Latest Reference Range & Units 10/2/22 15:15   Source + CELL CT/DIFF-BF  Pleural   Appearance, Fluid  Clear   Color, Fluid  Yellow   Eos, Fluid % 8   Fluid Type  Pleural   Glucose, Fluid Not Established mg/dL 177.0   LD, Fluid Not Established U/L 136   RBC, Fluid /cumm 300   Lymphocytes, Body Fluid % 70   Monocyte Count, Fluid % 22   Nucl Cell, Fluid /cumm 96   Protein, Fluid Not Established g/dL 4.0   Albumin, Fluid Not Established g/dL 2.7   Clot Eval.  see below   Number of Cells Counted Fluid  100      10/2/22 15:15   AFB Culture (Mycobacteria) No growth after 6 weeks of incubation. CULTURE WITH SMEAR, ACID FAST BACILLIUS Rpt   AFB Smear No AFB observed by Fluorescent stain       Left Pleural Fluid:      - Negative for Malignancy     Assessment:  Principal Problem:    Acute on chronic respiratory failure ([facility])  Active Problems:    DM2 (diabetes mellitus, type 2) ([facility])    Acute on chronic combined systolic and diastolic heart failure ([facility])    Ischemic cardiomyopathy    Asthma-COPD overlap syndrome ([facility])    History of transcatheter aortic valve replacement (TAVR)    Former smoker    SOB (shortness of breath)    Compression atelectasis    Obesity (BMI 30-39.9)    ZAINAB (obstructive sleep apnea)    Pleural effusion on left    Chronic anemia    ESRD (end stage renal disease) on dialysis ([facility])    Grade II diastolic dysfunction  Resolved Problems:    * No resolved hospital problems.  *          Plan:     Oxygen supplementation  to keep saturation being 90-94% only  Please titrate down the oxygen as per the above parameters  BIPAP/NIV on intermittent basis   Case management consult requested to see about a new NIV at home   Pulmonary toilet  Patient had undergone thoracentesis done in October of this year and the results are as above  Patient has recurrence of pleural effusions which can be secondary to the heart failure cardiomyopathy and renal failure   Repeating thoracentesis may not change patient's overall clinical status  Anyhow patient is on Plavix and Eliquis at this time  Patient does have history of GB syndrome in the past-patient will benefit from a repeat PFT as an outpatient to assess for any worsening of obstructive airway disease or restrictive disease  Ideally  speaking patient will also benefit from MIP and MEP  If long-term solution for repeated pleural fluid accumulation needs to be considered-pleurX catheter insertion as per IM continue be an option  HD as per nephrology  No clinical data from pulmonary standpoint of view except assist any antibiotic therapy even though patient may have some elevation of procalcitonin  Antihypertensives as per IM/nephrology   Patient may need dry weight to be modified-nephrology to decide upon that   Optimization of cardiac medications as per cardiology  Bronchodilators  No need for  any systemic steroids at this time  Management of hyperglycemia as per IM  Consider IV Venofer and Retacrit if deemed appropriate  Trend hemodynamics and cardiac rhythm closely  Diet and life style modifications  Cymbalta and Seroquel as per IM to continue  PUD prophylaxis        Electronically signed by:  Jose Jaquez MD    12/6/2022    6:20 PM.

## 2022-12-06 NOTE — PROGRESS NOTES
12/06/22 0023   NIV Type   $NIV $Daily Charge   Equipment Type v60   Mode Bilevel   Mask Type Full face mask   Mask Size Medium   Settings/Measurements   PIP Observed 17 cm H20   IPAP 16 cmH20   CPAP/EPAP 8 cmH2O   Vt (Measured) 456 mL   Rate Ordered 16   Resp 20   FiO2  40 %  (pt found on 40%)   Minute Volume (L/min) 9.2 Liters   Mask Leak (lpm) 15 lpm   Comfort Level Good   Using Accessory Muscles No   SpO2 100   Patient's Home Machine No   Alarm Settings   Alarms On Y   Low Pressure (cmH2O) 6 cmH2O   High Pressure (cmH2O) 30 cmH2O   Apnea (secs) 20 secs   RR Low (bpm) 6   RR High (bpm) 40 br/min

## 2022-12-06 NOTE — CARE COORDINATION
CASE MANAGEMENT INITIAL ASSESSMENT      Reviewed chart and completed assessment with patient: Pt  Family present: None  Explained Case Management role/services. Yes    Primary contact information:Pt's wife PR[facility]IRIE [facility] Decision Maker :   Primary Decision Maker: Crystal Ann - Spouse - [phone]    Secondary Decision Maker: Lalitha Rodriguez - Brother/Sister - [phone]        Admit date/status:12/4/22  Diagnosis: Fluid overload/ SOB/CHF    Is this a Readmission?:  Yes      Insurance:bc   Precert required for SNF: Yes       3 night stay required: No    Living arrangements, Adls, care needs, prior to admission:Pt lives at home with his wife and states he does his own ADL'S     Durable Medical Equipment at home:  Walker__Cane__RTS__ BSC__Shower Chair_X_  02_X 2-3 [employer] [address] [facility]_ HHN__ CPAP_X_  BiPap__  Hospital Bed__ W/C___ Other_____    Services in the home and/or outpatient, prior to admission: [facility]                  Medications: Prescription coverage? Yes Will pt require financial assistance with medications No     Transportation needs: Family will transport      Dialysis Facility (if applicable)   Name: Tom Cantu   Address:  Dialysis Schedule: [facility] [address]  Phone:  Fax:    PT/OT recs:Not yet ordered     Hospital Exemption Notification (HEN):Needed for SNF     Barriers to discharge:None     Plan/comments:12/6/22 Pt plans on going home with his wife and resuming [place] [id number] with [facility] for nursing only. Has home o2 with [facility]. Pt states his Cpap isn't working, referral made to [employer] rep to look into that issues. Pt states his HD transport isn't reliable. Will make a call to [facility] to see if the SW there can help out any with transportation. I suspect that the reason Medicaid transport isn't showing up might be because the pt isn't scheduling with them correctly.  I think he has to schedule every time but he seems to think they should just show up per his HD schedule, not sure how that works. Will reach out to SW at [facility] for more help and ask her to contact the pt to arrange transport.         ECOC on chart for MD signature

## 2022-12-06 NOTE — CARE COORDINATION
12/6/22 Spoke to Margaret Shields the  at [facility] and she is familiar with this pt and states she will reach out to him to help with his transportation issues to and from . Received a call from pt's Justen Zavala CM requesting updates will also speak with ehr about possible additional resources for the pt.  Pt's ETHAN Umu: [phone]

## 2022-12-06 NOTE — PROGRESS NOTES
12/06/22 0441   NIV Type   Skin Protection for O2 Device No  (pt refused)   Equipment Type v60   Mode Bilevel   Mask Type Full face mask   Mask Size Medium   Settings/Measurements   IPAP 16 cmH20   CPAP/EPAP 8 cmH2O   Vt (Measured) 523 mL   Rate Ordered 16   Resp 17   FiO2  35 %  (decreased to 30%)   Minute Volume (L/min) 9.1 Liters   Mask Leak (lpm) 43 lpm   Comfort Level Good   Using Accessory Muscles No   SpO2 100   Patient's Home Machine No   Alarm Settings   Alarms On Y   Low Pressure (cmH2O) 6 cmH2O   High Pressure (cmH2O) 30 cmH2O   Apnea (secs) 20 secs   RR Low (bpm) 6   RR High (bpm) 40 br/min

## 2022-12-06 NOTE — PROGRESS NOTES
12/05/22 2127   NIV Type   Skin Assessment Clean, dry, & intact   Skin Protection for O2 Device No  (pt refused)   NIV Started/Stopped On   Equipment Type v60   Mode Bilevel   Mask Type Full face mask   Mask Size Medium   Settings/Measurements   IPAP 16 cmH20   CPAP/EPAP 8 cmH2O   Vt (Measured) 896 mL   Rate Ordered 16   Resp 22   FiO2  50 %   Minute Volume (L/min) 19.1 Liters   Mask Leak (lpm) 36 lpm   Comfort Level Good   Using Accessory Muscles No   SpO2 100   Patient's Home Machine No   Alarm Settings   Alarms On Y   Low Pressure (cmH2O) 6 cmH2O   High Pressure (cmH2O) 30 cmH2O   Delay Alarm 20 sec(s)   RR Low (bpm) 6   RR High (bpm) 40 br/min   Oxygen Therapy/Pulse Ox   SpO2 100 %

## 2022-12-06 NOTE — PROGRESS NOTES
CC:ESRD  HPI     44 y/o male with history of ESRD admitted with acute onset of shortness of breath. He was recently admitted with pneumonia. He says since discharge he has been having fevers and night sweats. He has been compliant with dialysis and he did got on Saturday 12/3. He had 3 liter UF on 12/4 with post weight of 99 kg which is below his prior target of 101kg.       SUBJECTIVE  On O2  SOB the same    SOC: no visitors      Scheduled Meds:   apixaban  2.5 mg Oral BID    calcium acetate  1 capsule Oral TID WC    clopidogrel  75 mg Oral Daily    DULoxetine  30 mg Oral Daily    isosorbide dinitrate  20 mg Oral TID    metoprolol succinate  100 mg Oral BID    pantoprazole  40 mg Oral QAM AC    pravastatin  40 mg Oral Daily    QUEtiapine  50 mg Oral Nightly    sodium chloride flush  5-40 mL IntraVENous 2 times per day    insulin lispro  0-4 Units SubCUTAneous TID WC    insulin lispro  0-4 Units SubCUTAneous Nightly     Continuous Infusions:   sodium chloride      dextrose       PRN Meds:calcium carbonate, gabapentin, sodium chloride flush, sodium chloride, ondansetron **OR** ondansetron, polyethylene glycol, acetaminophen **OR** acetaminophen, glucose, dextrose bolus **OR** dextrose bolus, glucagon (rDNA), dextrose, oxyCODONE-acetaminophen, labetalol, labetalol, ipratropium-albuterol      Objective:      Physical Exam     Wt Readings from Last 3 Encounters:   12/06/22 238 lb 12.1 oz (108.3 kg)   12/02/22 220 lb (99.8 kg)   11/28/22 253 lb 8.5 oz (115 kg)     Temp Readings from Last 3 Encounters:   12/06/22 98 °F (36.7 °C) (Axillary)   12/03/22 97 °F (36.1 °C)   11/28/22 98.1 °F (36.7 °C)     BP Readings from Last 3 Encounters:   12/06/22 129/75   12/03/22 (!) 156/75   11/28/22 132/78     Pulse Readings from Last 3 Encounters:   12/06/22 60   12/03/22 91   11/28/22 63    General:  NAD, A+Ox3, ill-appearing, normal body habitus  HEENT:  PERRL, EOMI  Neck:  Supple, normal range of movement  Chest: On bilevel, no wheezing   CV:  Tachycardic, no rub   Abdomen:  NTND, soft, +BS, no hepatosplenomegaly  Extremities:  No peripheral edema  Neurological:  Moving all four extremities, CN II-XII grossly intact  Lymphatics:  No palpable lymph nodes  Skin:  No rash, no jaundice  Psychiatric:  Alert, flat affect  Access:  Left [facility]           Lab Review   Lab Results   Component Value Date    WBC 13.4 (H) 12/04/2022    HGB 8.6 (L) 12/04/2022    HCT 26.2 (L) 12/04/2022    MCV 90.7 12/04/2022     12/04/2022     Lab Results   Component Value Date/Time     12/05/2022 06:00 AM    K 4.9 12/05/2022 06:00 AM    K 3.8 12/02/2022 11:54 PM    CL 97 12/05/2022 06:00 AM    CO2 17 12/05/2022 06:00 AM    BUN 54 12/05/2022 06:00 AM    CREATININE 7.1 12/05/2022 06:00 AM    GLUCOSE 220 12/05/2022 06:00 AM    CALCIUM 8.6 12/05/2022 06:00 AM            Patient Active Problem List    Diagnosis Date Noted    Hypotension due to drugs 11/25/2017    Acute on chronic combined systolic and diastolic heart failure ([address]     Ischemic cardiomyopathy     Dyslipidemia     DM2 (diabetes mellitus, type 2) ([address] 03/04/2014    Bicuspid aortic valve 06/03/2013    CHF (congestive heart failure) ([address] 05/14/2013    CAD (coronary artery disease) 05/14/2013    PVD (peripheral vascular disease) ([address] 05/14/2013    Acute on chronic respiratory failure ([address] 12/04/2022    Acute pneumonia 11/25/2022    Hypervolemia 10/19/2022    Pericardial effusion 09/28/2022    HFrEF (heart failure with reduced ejection fraction) ([address] 09/24/2022    Acute respiratory failure with hypoxia and hypercapnia ([address] 09/23/2022    Hypertensive emergency 07/17/2022    SOB (shortness of breath) 07/17/2022    Altered mental status     Hypoglycemia 05/29/2022    Morbid obesity with BMI of 40.0-44.9, adult ([facility] 04/26/2022    Former smoker 04/19/2022    Cardiomyopathy ([facility] 04/19/2022    History of transcatheter aortic valve replacement (TAVR) 10/19/2019    Uncontrolled hypertension 11/14/2013 Asthma-COPD overlap syndrome ([address] 05/14/2013    Respiratory failure ([address] 04/18/2022    Pulmonary edema with diastolic CHF, NYHA class 3 ([address] 03/17/2022    Liberty-rectal abscess     Somnolence     Atrial flutter ([address]     SIRS (systemic inflammatory response syndrome) ([address] 02/21/2022    NSTEMI (non-ST elevated myocardial infarction) ([address] 01/12/2022    Acute respiratory failure with hypercapnia ([address] 11/30/2021    Acute pulmonary edema ([address] 11/30/2021    Grade II diastolic dysfunction 37/31/5348    Shock circulatory ([address] 11/30/2021    Smoker 11/30/2021    Normocytic normochromic anemia 11/30/2021    Respiratory failure with hypoxia ([address] 11/29/2021    Speech problem     Urinary tract infection with hematuria     Acute CVA (cerebrovascular accident) ([address] 09/07/2021    Acute hypoxemic respiratory failure ([address] 08/12/2021    Type 2 diabetes mellitus with hyperglycemia ([address] 06/27/2021    Acute encephalopathy 06/27/2021    Cellulitis and abscess of hand 04/24/2021    Iliac artery occlusion, right (HCC)     Cellulitis of right foot 01/29/2021    Peripheral vascular occlusive disease ([address] 01/02/2021    Ataxia     Weakness of both lower extremities 12/30/2020    Sinus bradycardia 12/30/2020    Sinus pause     GBS (Guillain-Olympia syndrome) ([facility])     Bilateral leg weakness 12/04/2020    Bradycardia 10/19/2019    Syncope and collapse 10/19/2019    PAF (paroxysmal atrial fibrillation) ([address]     Hypercholesteremia 07/30/2019    Chronic bronchitis ([address] 05/21/2019    Nasal congestion 05/21/2019    Chronic, continuous use of opioids 04/15/2019    Steal syndrome of dialysis vascular access (HCC)     SOB (shortness of breath) on exertion 12/24/2018    Anemia of chronic disease 11/12/2018    Pulmonary edema 11/09/2018    Fluid overload 11/09/2018    Renovascular hypertension     Mixed hyperlipidemia     Cigarette nicotine dependence in remission     Neuromuscular disorder ([address]     Acute diastolic CHF (congestive heart failure) ([facility] 03/18/2018    Hemodialysis-associated hypotension 11/22/2017    ESRD (end stage renal disease) on dialysis ([address] 11/22/2017    Mucus plugging of bronchi     Nonrheumatic aortic valve stenosis     Pleural effusion on left     Chronic anemia     History of CVA (cerebrovascular accident)     Type 2 diabetes mellitus without complication, without long-term current use of insulin (HCC)     ZAINAB (obstructive sleep apnea)     Tobacco abuse 05/21/2017    CVA (cerebral vascular accident) ([address] 05/21/2017    Angina, class IV ([address]     Dyspnea     Gastroparesis due to DM ([address]     Biliary colic 27/07/2478    Symptomatic cholelithiasis 01/04/2017    Degeneration of lumbar or lumbosacral intervertebral disc 03/18/2016    Lumbar radiculopathy 03/18/2016    Lumbosacral spondylosis without myelopathy 03/18/2016    ZAINAB on CPAP 02/11/2016    Obesity (BMI 30-39. 9)     PNA (pneumonia) 03/28/2015    Depression with anxiety 06/05/2014    Passive smoke exposure 05/30/2014    Diabetic neuropathy ([address] 11/14/2013    Claudication in peripheral vascular disease ([facility] 06/26/2013    Bilateral hilar adenopathy syndrome 06/03/2013    Sepsis without acute organ dysfunction ([address] 12/25/2012       ASSESSMENT AND PLAN   #ESRD:On dialysis TTS at [facility]  Prior target weight of 102 kg but after recent hospitalization he was down to 99 kg ( outpatient )  -Has working left [facility]. Prior fistula with dialysis associated steal syndrome and he had severe needle phobia so it was not used and ligated  -next HD tomorrow, orders reviewed  #Shortness of breath:  Recent pneumonia. Having fevers at home and WBC elevated; Rx for PNA  -CXR with unchanged left effusion and worsening right patchy airspace disease, possible pneumonia.   Possible pulmonary edema in setting of hypertension and systolic heart failure.   -po fluid restriction    #Anemia of chronic disease:  Getting DAVON at dialysis but with recent admission and frequent lab draws he is below baseline   #Hypertension:  Range improving

## 2022-12-07 LAB
ALBUMIN SERPL-MCNC: 3.4 G/DL (ref 3.4–5)
ANION GAP SERPL CALCULATED.3IONS-SCNC: 15 MMOL/L (ref 3–16)
BASOPHILS ABSOLUTE: 0.1 K/UL (ref 0–0.2)
BASOPHILS RELATIVE PERCENT: 1.2 %
BUN BLDV-MCNC: 57 MG/DL (ref 7–20)
CALCIUM SERPL-MCNC: 8.2 MG/DL (ref 8.3–10.6)
CHLORIDE BLD-SCNC: 97 MMOL/L (ref 99–110)
CO2: 24 MMOL/L (ref 21–32)
CREAT SERPL-MCNC: 5.7 MG/DL (ref 0.9–1.3)
EOSINOPHILS ABSOLUTE: 0.3 K/UL (ref 0–0.6)
EOSINOPHILS RELATIVE PERCENT: 4 %
GFR SERPL CREATININE-BSD FRML MDRD: 11 ML/MIN/{1.73_M2}
GLUCOSE BLD-MCNC: 127 MG/DL (ref 70–99)
GLUCOSE BLD-MCNC: 135 MG/DL (ref 70–99)
GLUCOSE BLD-MCNC: 137 MG/DL (ref 70–99)
GLUCOSE BLD-MCNC: 140 MG/DL (ref 70–99)
GLUCOSE BLD-MCNC: 142 MG/DL (ref 70–99)
HCT VFR BLD CALC: 26.1 % (ref 40.5–52.5)
HEMOGLOBIN: 8.6 G/DL (ref 13.5–17.5)
LYMPHOCYTES ABSOLUTE: 1.1 K/UL (ref 1–5.1)
LYMPHOCYTES RELATIVE PERCENT: 16.9 %
MCH RBC QN AUTO: 30.2 PG (ref 26–34)
MCHC RBC AUTO-ENTMCNC: 33.1 G/DL (ref 31–36)
MCV RBC AUTO: 91.3 FL (ref 80–100)
MONOCYTES ABSOLUTE: 0.2 K/UL (ref 0–1.3)
MONOCYTES RELATIVE PERCENT: 3.7 %
NEUTROPHILS ABSOLUTE: 4.6 K/UL (ref 1.7–7.7)
NEUTROPHILS RELATIVE PERCENT: 74.2 %
PDW BLD-RTO: 15.8 % (ref 12.4–15.4)
PERFORMED ON: ABNORMAL
PHOSPHORUS: 3.6 MG/DL (ref 2.5–4.9)
PLATELET # BLD: 329 K/UL (ref 135–450)
PMV BLD AUTO: 7.8 FL (ref 5–10.5)
POTASSIUM SERPL-SCNC: 4.3 MMOL/L (ref 3.5–5.1)
RBC # BLD: 2.86 M/UL (ref 4.2–5.9)
SODIUM BLD-SCNC: 136 MMOL/L (ref 136–145)
WBC # BLD: 6.2 K/UL (ref 4–11)

## 2022-12-07 PROCEDURE — 94761 N-INVAS EAR/PLS OXIMETRY MLT: CPT

## 2022-12-07 PROCEDURE — 94640 AIRWAY INHALATION TREATMENT: CPT

## 2022-12-07 PROCEDURE — 6370000000 HC RX 637 (ALT 250 FOR IP): Performed by: INTERNAL MEDICINE

## 2022-12-07 PROCEDURE — 2580000003 HC RX 258: Performed by: INTERNAL MEDICINE

## 2022-12-07 PROCEDURE — 97530 THERAPEUTIC ACTIVITIES: CPT

## 2022-12-07 PROCEDURE — 2500000003 HC RX 250 WO HCPCS: Performed by: INTERNAL MEDICINE

## 2022-12-07 PROCEDURE — 90935 HEMODIALYSIS ONE EVALUATION: CPT

## 2022-12-07 PROCEDURE — 97161 PT EVAL LOW COMPLEX 20 MIN: CPT

## 2022-12-07 PROCEDURE — 80069 RENAL FUNCTION PANEL: CPT

## 2022-12-07 PROCEDURE — 2060000000 HC ICU INTERMEDIATE R&B

## 2022-12-07 PROCEDURE — 94660 CPAP INITIATION&MGMT: CPT

## 2022-12-07 PROCEDURE — 36415 COLL VENOUS BLD VENIPUNCTURE: CPT

## 2022-12-07 PROCEDURE — 97165 OT EVAL LOW COMPLEX 30 MIN: CPT

## 2022-12-07 PROCEDURE — 85025 COMPLETE CBC W/AUTO DIFF WBC: CPT

## 2022-12-07 PROCEDURE — 2700000000 HC OXYGEN THERAPY PER DAY

## 2022-12-07 PROCEDURE — 99232 SBSQ HOSP IP/OBS MODERATE 35: CPT | Performed by: INTERNAL MEDICINE

## 2022-12-07 RX ADMIN — APIXABAN 2.5 MG: 2.5 TABLET, FILM COATED ORAL at 21:56

## 2022-12-07 RX ADMIN — CALCIUM ACETATE 667 MG: 667 CAPSULE ORAL at 10:08

## 2022-12-07 RX ADMIN — ISOSORBIDE DINITRATE 20 MG: 20 TABLET ORAL at 21:56

## 2022-12-07 RX ADMIN — METOPROLOL SUCCINATE 100 MG: 50 TABLET, EXTENDED RELEASE ORAL at 10:08

## 2022-12-07 RX ADMIN — ISOSORBIDE DINITRATE 20 MG: 20 TABLET ORAL at 14:51

## 2022-12-07 RX ADMIN — METOPROLOL SUCCINATE 100 MG: 50 TABLET, EXTENDED RELEASE ORAL at 21:56

## 2022-12-07 RX ADMIN — SODIUM CHLORIDE, PRESERVATIVE FREE 5 ML: 5 INJECTION INTRAVENOUS at 21:56

## 2022-12-07 RX ADMIN — DULOXETINE HYDROCHLORIDE 30 MG: 30 CAPSULE, DELAYED RELEASE ORAL at 10:09

## 2022-12-07 RX ADMIN — CALCIUM ACETATE 667 MG: 667 CAPSULE ORAL at 12:11

## 2022-12-07 RX ADMIN — PRAVASTATIN SODIUM 40 MG: 40 TABLET ORAL at 10:09

## 2022-12-07 RX ADMIN — ISOSORBIDE DINITRATE 20 MG: 20 TABLET ORAL at 10:09

## 2022-12-07 RX ADMIN — CLOPIDOGREL BISULFATE 75 MG: 75 TABLET ORAL at 10:09

## 2022-12-07 RX ADMIN — IPRATROPIUM BROMIDE AND ALBUTEROL SULFATE 1 AMPULE: 2.5; .5 SOLUTION RESPIRATORY (INHALATION) at 10:23

## 2022-12-07 RX ADMIN — QUETIAPINE FUMARATE 50 MG: 25 TABLET ORAL at 21:56

## 2022-12-07 RX ADMIN — APIXABAN 2.5 MG: 2.5 TABLET, FILM COATED ORAL at 10:09

## 2022-12-07 RX ADMIN — SODIUM CHLORIDE, PRESERVATIVE FREE 10 ML: 5 INJECTION INTRAVENOUS at 11:10

## 2022-12-07 RX ADMIN — OXYCODONE AND ACETAMINOPHEN 1 TABLET: 7.5; 325 TABLET ORAL at 10:12

## 2022-12-07 RX ADMIN — OXYCODONE AND ACETAMINOPHEN 1 TABLET: 7.5; 325 TABLET ORAL at 21:57

## 2022-12-07 RX ADMIN — CALCIUM ACETATE 667 MG: 667 CAPSULE ORAL at 16:47

## 2022-12-07 RX ADMIN — GABAPENTIN 200 MG: 100 CAPSULE ORAL at 10:08

## 2022-12-07 ASSESSMENT — PAIN DESCRIPTION - LOCATION
LOCATION: BACK
LOCATION: BACK

## 2022-12-07 ASSESSMENT — PAIN SCALES - GENERAL
PAINLEVEL_OUTOF10: 8
PAINLEVEL_OUTOF10: 8

## 2022-12-07 NOTE — CARE COORDINATION
12/7/22 Pt from home with [facility] but expressed interest in SNF today, pt's plan was to go home now he wants SNF. Pt requested [facility]. [facility] referral made and they have accepted. Pt is a current HD pt at [facility] MWF 11:3O. PT/OT ordered today, pt has been on Bipap and in HD unable to work with therapy. Will need to start cert when we have PT/OT notes. Kavon working on NIV for home use.

## 2022-12-07 NOTE — PROGRESS NOTES
INPATIENT PULMONARY CRITICAL CARE PROGRESS NOTE      Reason for visit    persistent respiratory failure, elevated Pro-Russ, need for antibiotics question also left pleural effusion    SUBJECTIVE: Patient when seen this morning continues to have some chest pressure, patient was not have any significant cough or expectoration or any chest pain per se, patient was not having any fever or chills, patient was afebrile and he medically maintained, patient was supposed to have another cycle of dialysis today, patient's glycemic control was acceptable, patient use an IV last night, patient was not 4 L of nasal cannula oxygen was saturating 99% when seen, patient was alert and oriented, no other pertinent review of system of concern           Physical Exam:  Blood pressure 126/78, pulse 56, temperature 97.4 °F (36.3 °C), temperature source Axillary, resp. rate 18, height 5' 9\" (1.753 m), weight 245 lb 6 oz (111.3 kg), SpO2 100 %.'     Constitutional: No respiratory distress   HENT: No facial asymmetry no thyromegaly. Eyes:  Conjunctivae are normal. Pupils equal, round, and reactive to light. No scleral icterus. Neck: . No tracheal deviation present. No obvious thyroid mass. Short large neck  Cardiovascular: Normal rate, regular rhythm, normal heart sounds. No right ventricular heave. some lower extremity edema. Pulmonary/Chest: No wheezes. minimal  bilateral rales. Chest wall is not dull to percussion. No accessory muscle usage or stridor. Decreased breath sound intensity(L>R)  Abdominal: Soft. Bowel sounds present. No distension or hernia. No tenderness. Obese  Musculoskeletal: No cyanosis. No clubbing. No obvious joint deformity. Lymphadenopathy: No cervical or supraclavicular adenopathy. Skin: Skin is warm and dry. No rash or nodules on the exposed extremities.   Psychiatric: Normal reaction ;no anxiety   Neurologic Alert and communicative with no focal cranial deficits    Results:  CBC:   Recent Labs 12/04/22  1200   WBC 13.4*   HGB 8.6*   HCT 26.2*   MCV 90.7        BMP:   Recent Labs     12/04/22  1200 12/05/22  0600 12/05/22  0910   * 134*  --    K 4.3 4.9  --    CL 98* 97*  --    CO2 23 17*  --    PHOS  --   --  4.1   BUN 41* 54*  --    CREATININE 6.1* 7.1*  --      LIVER PROFILE:   Recent Labs     12/04/22  1200   AST 8*   ALT 7*   BILITOT 0.3   ALKPHOS 66       UA:  Recent Labs     12/04/22  1319   COLORU Yellow   PHUR 8.0   WBCUA 6-9*   RBCUA 0-2   CLARITYU Clear   SPECGRAV 1.020   LEUKOCYTESUR TRACE*   UROBILINOGEN 0.2   BILIRUBINUR Negative   BLOODU TRACE-INTACT*   GLUCOSEU 500*       Imaging:  I have reviewed radiology images personally. XR CHEST (2 VW)   Final Result   Stable left pleural effusion with associated airspace change in the left   lower lobe. Improved aeration in the right lung. XR CHEST PORTABLE   Final Result   Worsening aeration in the right lower lung zone, otherwise no significant   change compared with 12/02/2022. XR CHEST (2 VW)    Result Date: 12/6/2022  EXAMINATION: TWO XRAY VIEWS OF THE CHEST 12/6/2022 8:31 am COMPARISON: 12/04/2022 rate HISTORY: ORDERING SYSTEM PROVIDED HISTORY: sob, resp failure, f/u cxr TECHNOLOGIST PROVIDED HISTORY: Reason for exam:->sob, resp failure, f/u cxr Reason for Exam: sob, resp failure, f/u cxr FINDINGS: Stable vascular catheter on the left. The heart is enlarged. Prior AVR. Relatively stable volume of a moderate left pleural effusion. Dense airspace opacification in the left lower lung zone. Improved aeration of the right lung. No significant skeletal finding. Stable left pleural effusion with associated airspace change in the left lower lobe. Improved aeration in the right lung.      XR CHEST PORTABLE    Result Date: 12/4/2022  EXAMINATION: ONE XRAY VIEW OF THE CHEST 12/4/2022 11:21 am COMPARISON: 12/02/2022 and prior HISTORY: ORDERING SYSTEM PROVIDED HISTORY: SOB TECHNOLOGIST PROVIDED HISTORY: Reason for exam:->SOB Reason for Exam: sob FINDINGS: Left dual lumen catheter tip near the cavoatrial junction. Unchanged moderate left pleural effusion with adjacent patchy opacities. Increased patchy opacification overlying the right lower lung zone. Status post TAVR. Tanda Bun Unchanged mild cardiomegaly. Tanda Bun No pneumothorax. No acute osseous abnormality. Aortic knob calcifications. Worsening aeration in the right lower lung zone, otherwise no significant change compared with 12/02/2022. XR CHEST PORTABLE    Result Date: 12/2/2022  EXAMINATION: ONE XRAY VIEW OF THE CHEST 12/2/2022 9:29 pm COMPARISON: Chest 11/25/2022 HISTORY: ORDERING SYSTEM PROVIDED HISTORY: shortness of breath FINDINGS: The cardiac silhouette is enlarged. Sequela from TAVR. Calcifications involving the aorta reflect atherosclerosis. The mediastinal and hilar silhouettes appear unremarkable. Moderate size left pleural effusion with left basilar consolidation obscuring left hemidiaphragm and left heart margin bowel. Vascular engorgement and cephalization is demonstrated with bilateral peribronchial cuffing and perivascular haziness. No focal consolidation or pleural effusion evident on the right. Unchanged left IJ hemodialysis catheter, tip at the superior cavoatrial junction level. No pneumothorax is seen. No acute osseous abnormality is identified. 1.  Congestive heart failure is most likely given the radiographic findings; pneumonia is also a consideration in areas of consolidation with pleural effusion. Similar appearance to prior study. 2. Calcific atherosclerosis aorta. 3. Cardiomegaly.      XR CHEST PORTABLE    Result Date: 11/25/2022  EXAMINATION: ONE XRAY VIEW OF THE CHEST 11/25/2022 9:26 am COMPARISON: Chest x-ray dated 18 October 2022 HISTORY: ORDERING SYSTEM PROVIDED HISTORY: Shortness of Breath TECHNOLOGIST PROVIDED HISTORY: Reason for exam:->Shortness of Breath Reason for Exam: SOB FINDINGS: Moderate left pleural effusion with left basilar airspace disease. Right lung is clear. No pneumothorax. No cardiomegaly. Left-sided central venous catheter with the tip in the superior vena cava. Moderate left pleural effusion with left basilar airspace disease. This could represent atelectasis or pneumonia in the appropriate clinical setting. Assessment:  Principal Problem:    Acute on chronic respiratory failure (HCC)  Active Problems:    DM2 (diabetes mellitus, type 2) ([facility])    Acute on chronic combined systolic and diastolic heart failure ([facility])    Ischemic cardiomyopathy    Asthma-COPD overlap syndrome ([facility])    History of transcatheter aortic valve replacement (TAVR)    Former smoker    SOB (shortness of breath)    Compression atelectasis    Obesity (BMI 30-39.9)    ZAINAB (obstructive sleep apnea)    Pleural effusion on left    Chronic anemia    ESRD (end stage renal disease) on dialysis ([facility])    Grade II diastolic dysfunction  Resolved Problems:    * No resolved hospital problems.  *          Plan:   Oxygen supplementation  to keep saturation being 90-94% only  Please titrate down the oxygen as per the above parameters  BIPAP/NIV on intermittent basis -will arrange that for home-discussed with case management and [employer] representative  Patient does have a chronic respite failure consequent to COPD; due to severe respiratory disease with chronic hypercapnia, patient may benefit from NIV and patient requires psychiatric volumes, backup battery with alarms to alert the caregivers of increased respiratory, mouthpiece ventilation to aid in ADLs and encourage mobility and the NIV is required to decrease hospital admissions, the patient is at high risk of exacerbation leading to possible decline in harm without risk of therapy  Pulmonary toilet  Patient had undergone thoracentesis done in October of this year and the results are as above  Patient has recurrence of pleural effusions which can be secondary to the heart failure cardiomyopathy and renal failure   Repeating thoracentesis may not change patient's overall clinical status  Anyhow patient is on Plavix and Eliquis at this time  Patient does have history of GB syndrome in the past-patient will benefit from a repeat PFT as an outpatient to assess for any worsening of obstructive airway disease or restrictive disease  Ideally  speaking patient will also benefit from MIP and MEP  If long-term solution for repeated pleural fluid accumulation needs to be considered-pleurX catheter insertion as per IM continue be an option  HD as per nephrology  No clinical data from pulmonary standpoint of view except assist any antibiotic therapy even though patient may have some elevation of procalcitonin  Antihypertensives as per IM/nephrology   Patient may need dry weight to be modified-nephrology to decide upon that   Optimization of cardiac medications as per cardiology  Bronchodilators  No need for  any systemic steroids at this time  Management of hyperglycemia as per IM  Consider IV Venofer and Retacrit if deemed appropriate  Trend hemodynamics and cardiac rhythm closely  Diet and life style modifications  Cymbalta and Seroquel as per IM to continue  PUD prophylaxis      Case discussed with patient and DME representative          Electronically signed by:  Mert Boss MD    12/7/2022    10:55 AM.

## 2022-12-07 NOTE — PROGRESS NOTES
12/06/22 2148   NIV Type   Skin Assessment Clean, dry, & intact   Skin Protection for O2 Device No  (pt does not want mepilex)   NIV Started/Stopped On   Equipment Type v60   Mode Bilevel   Mask Type Full face mask   Mask Size Medium   Settings/Measurements   PIP Observed 18 cm H20   IPAP 16 cmH20   CPAP/EPAP 8 cmH2O   Vt (Measured) 643 mL   Rate Ordered 14   Resp 28   FiO2  30 %   I Time/ I Time % 1.05 s   Minute Volume (L/min) 17 Liters   Mask Leak (lpm) 13 lpm   Comfort Level Good   Using Accessory Muscles No   SpO2 99   Patient's Home Machine No   Alarm Settings   Alarms On Y   Low Pressure (cmH2O) 6 cmH2O   High Pressure (cmH2O) 30 cmH2O   Delay Alarm 20 sec(s)   RR Low (bpm) 6   RR High (bpm) 40 br/min

## 2022-12-07 NOTE — CONSULTS
[address] 53 1970    History:  Past Medical History:   Diagnosis Date    Ambulatory dysfunction     walker for long distances, SOB with distance    Aortic stenosis     echo 2017    Arthritis     hands and hips    Asthma     Bilateral hilar adenopathy syndrome 6/3/2013    CAD (coronary artery disease)     Dr. Farhad Wan [facility]) 04/19/2019    EF= 43%    CHF (congestive heart failure) ([address]     Chronic pain     COPD (chronic obstructive pulmonary disease) ([address]     pulmonology Dr. Delia Vail    Depression     Diabetes mellitus ([facility]     borderline    Difficult intravenous access     Emphysema of lung ([address]     ESRD (end stage renal disease) on dialysis ([address]     MWF    Fear of needles     Gastric ulcer     GERD (gastroesophageal reflux disease)     Heart valve problem     bicuspic valve    Hemodialysis patient ([address]     History of spinal fracture     work incident    Hx of blood clots     Bilateral lower extremities; stents in place    Hyperlipidemia     Hypertension     MI (myocardial infarction) ([facility] 2019    has had 9 MIs. 2019 was the last    Neuromuscular disorder ([facility]     due to CVA    Numbness and tingling in left arm     from fistula    Pneumonia     PONV (postoperative nausea and vomiting)     Prolonged emergence from general anesthesia     States requires more medication than most people    Sleep apnea     Uses CPAP    Stroke ([facility]     7mm thalamic cva 2017 deficts left side, left side weakness    TIA (transient ischemic attack)     Unspecified diseases of blood and blood-forming organs        ECHO:n 9/28/22   20-25%  HgA1C:    Iron Saturation:   6/23/22   23  Ferritin: 1/12/22   624.0    ACE/ARB/ARNI: renal disease  BB: Toprol  mg BID  Spironolactone:  Isordil 20mg TID  SGLT2:    Last Hospital Admission:  11/25/22   Discharge plans: home with [facility] but wants SNF    Advanced Directives: patient has swelling, cough, or weight gain of 2-3 pounds in a day / 5 pounds in a week. Also notified patient to call the doctor if he feels dizzy, increased fatigue, decreased or difficulty urinating. Reviewed the red, yellow, green zones of heart failure self management. Encouraged patient to call the MD with early signs of an acute heart failure exacerbation or when in the yellow zone. Patient provided with both written and verbal education on CHF signs/symptoms, causes, discharge medications, daily weights, low sodium diet, activity, fluid restriction, and follow-up. Pt verbalized understanding. No additional questions at this time. Stated he will alert his nurse with any questions. PATIENT/CAREGIVER TEACHING:    Level of patient/caregiver understanding able to:   [ Levy De La Rosa Verbalize understanding [ ] Demonstrate understanding [ ] Teach back   [ ] Needs reinforcement [ ] Other:     Education Time: 15 min    Recommendations:   1. Encourage follow-up appointment compliance. 2. Educate further on fluid restriction 48 oz - 64 oz during inpatient stay so he can understand how to measure intake at home. 3. Review sodium restrictions. Encourage patient to not add table salt to his foods and avoid foods that are high in sodium. 4. Continue to educate on S/S. Stress the importance of calling the MD with the earliest signs of an acute exacerbation. 5. Emphasize daily weights - instruct patient to call the MD if he gains 2-3 lb in a day or 5 lb in a week. 6. Provided patient with CHF Resource Line for questions and concerns.      12/7/2022 2:16 PM

## 2022-12-07 NOTE — PROGRESS NOTES
Hospitalist Progress Note      PCP: Pcp No    Date of Admission: 12/4/2022    Chief Complaint: [address] Course: reviewed     Subjective: sob improved today, off bipap, eating bkfst       Medications:  Reviewed    Infusion Medications    sodium chloride      dextrose       Scheduled Medications    apixaban  2.5 mg Oral BID    calcium acetate  1 capsule Oral TID WC    clopidogrel  75 mg Oral Daily    DULoxetine  30 mg Oral Daily    isosorbide dinitrate  20 mg Oral TID    metoprolol succinate  100 mg Oral BID    pantoprazole  40 mg Oral QAM AC    pravastatin  40 mg Oral Daily    QUEtiapine  50 mg Oral Nightly    sodium chloride flush  5-40 mL IntraVENous 2 times per day    insulin lispro  0-4 Units SubCUTAneous TID WC    insulin lispro  0-4 Units SubCUTAneous Nightly     PRN Meds: calcium carbonate, gabapentin, sodium chloride flush, sodium chloride, ondansetron **OR** ondansetron, polyethylene glycol, acetaminophen **OR** acetaminophen, glucose, dextrose bolus **OR** dextrose bolus, glucagon (rDNA), dextrose, oxyCODONE-acetaminophen, labetalol, labetalol, ipratropium-albuterol      Intake/Output Summary (Last 24 hours) at 12/7/2022 0852  Last data filed at 12/6/2022 2108  Gross per 24 hour   Intake 222 ml   Output --   Net 222 ml       Physical Exam Performed:    /78   Pulse 56   Temp 97.4 °F (36.3 °C) (Axillary)   Resp 18   Ht 5' 9\" (1.753 m)   Wt 245 lb 6 oz (111.3 kg)   SpO2 99%   BMI 36.24 kg/m²          General appearance:  no distress, appears stated age and cooperative. HEENT: Pupils equal, round, and reactive to light. Conjunctivae/corneas clear. Neck: Supple, with full range of motion. No jugular venous distention. Trachea midline. Respiratory:  improved respiratory effort. Clear to auscultation, bilaterally without Rales/Wheezes/Rhonchi. Cardiovascular: Regular rate and rhythm with normal S1/S2 without murmurs, rubs or gallops.   Abdomen: Soft, non-tender, non-distended with normal bowel sounds. Musculoskeletal: No clubbing, cyanosis or edema bilaterally. Full range of motion without deformity. Skin: Skin color, texture, turgor normal.  No rashes or lesions. Neurologic:  Neurovascularly intact without any focal sensory/motor deficits. Cranial nerves: II-XII intact, grossly non-focal.  Psychiatric: Alert and oriented, thought content appropriate, normal insight  Capillary Refill: Brisk, 3 seconds, normal   Peripheral Pulses: +2 palpable, equal bilaterally     Labs:   Recent Labs     12/04/22  1200   WBC 13.4*   HGB 8.6*   HCT 26.2*        Recent Labs     12/04/22  1200 12/05/22  0600 12/05/22  0910   * 134*  --    K 4.3 4.9  --    CL 98* 97*  --    CO2 23 17*  --    BUN 41* 54*  --    CREATININE 6.1* 7.1*  --    CALCIUM 8.6 8.6  --    PHOS  --   --  4.1     Recent Labs     12/04/22  1200   AST 8*   ALT 7*   BILITOT 0.3   ALKPHOS 66     No results for input(s): INR in the last 72 hours. Recent Labs     12/04/22  1200   TROPONINI 0.14*       Urinalysis:      Lab Results   Component Value Date/Time    NITRU Negative 12/04/2022 01:19 PM    WBCUA 6-9 12/04/2022 01:19 PM    BACTERIA 3+ 05/29/2022 12:51 PM    RBCUA 0-2 12/04/2022 01:19 PM    BLOODU TRACE-INTACT 12/04/2022 01:19 PM    SPECGRAV 1.020 12/04/2022 01:19 PM    GLUCOSEU 500 12/04/2022 01:19 PM       Radiology:  XR CHEST (2 VW)   Final Result   Stable left pleural effusion with associated airspace change in the left   lower lobe. Improved aeration in the right lung. XR CHEST PORTABLE   Final Result   Worsening aeration in the right lower lung zone, otherwise no significant   change compared with 12/02/2022.              IP CONSULT TO HOSPITALIST  IP CONSULT TO HEART FAILURE NURSE/COORDINATOR  IP CONSULT TO DIETITIAN  IP CONSULT TO NEPHROLOGY  IP CONSULT TO PULMONOLOGY  IP CONSULT TO CASE MANAGEMENT    Assessment/Plan:    Active Hospital Problems    Diagnosis     Acute on chronic combined systolic and diastolic heart failure (HCC) [I50.43]      Priority: High    Ischemic cardiomyopathy [I25.5]      Priority: High    DM2 (diabetes mellitus, type 2) ([facility] [E11.9]      Priority: High    Compression atelectasis [J98.11]      Priority: Medium    Acute on chronic respiratory failure (HCC) [J96.20]      Priority: Medium    SOB (shortness of breath) [R06.02]      Priority: Medium    Former smoker [P73.048]      Priority: Medium    History of transcatheter aortic valve replacement (TAVR) [Z95.2]      Priority: Medium    Asthma-COPD overlap syndrome ([facility] [J44.9]      Priority: Medium    Grade II diastolic dysfunction [N95.68]     ESRD (end stage renal disease) on dialysis ([facility] [N18.6, Z99.2]     Pleural effusion on left [J90]     Chronic anemia [D64.9]     ZAINAB (obstructive sleep apnea) [G47.33]     Obesity (BMI 30-39. 9) [E66.9]        Acute on chronic respiratory failure with Hypoxia: Recurrent presentation, just here 2 days prior. Frequent admissions for fluid overload and missed dialysis. However, reports compliance with HD this week. Recent treatment for Pneumonia. Currently, he does have mild leukocytosis but lacking other features of pneumonia. - checked Procalcitonin and elevated compared to prior(chronically elevated)  - continued treatment for presumed volume overload/CHF with IV Lasix and HD. Nephrology consulted. May need input from Cardiology and Pulmonology (not yet ordered). He does report using NIPPV at home (unclear if CPAP or BIPAP). Wonder if this may need to be adjusted. -repeat cxr 12/6 noted left effusion/recurrent, pulm consulted(did not rec tap, rather adjust HD)     Acute on chronic combined systolic and diastolic heart failure: Known LVEF 20-25%. Continued IV diuresis and HD. Monitor I/O, weights, BMP. Medical management. Diabetes Mellitus, Type 2: Diet Controlled. Recheck HbA1c and 7.0  - Low SSI if needed. Morbid Obesity -  With Body mass index is 32.49 kg/m².  Complicating assessment and treatment. Placing patient at risk for multiple co-morbidities as well as early death and contributing to the patient's presentation. Paroxysmal Atrial Fibrillation: Stable. Continued Eliquis 2.5 mg BID     DVT Prophylaxis: Eliquis  Diet: ADULT DIET;  Regular; 1000 ml  Code Status: Full Code    PT/OT Eval Status: not ordered     Dispo - pending improved resp status, further nephro/pulm recs, ?adjust fluid removal by HD(defer to nephro on this)    Appropriate for A1 Discharge Unit: No      Asya Couch MD

## 2022-12-07 NOTE — PROGRESS NOTES
Physical Therapy  Facility/Department: [facility] C4 PCU  Physical Therapy Initial Assessment    Name: Jose R Chanel  : 1970  MRN: [id number]  Date of Service: 2022    Discharge Recommendations:  24 hour supervision or assist, Home with Home health PT   PT Equipment Recommendations  Equipment Needed: No      Patient Diagnosis(es): The primary encounter diagnosis was Acute on chronic respiratory failure with hypoxia ([address] Diagnoses of COPD exacerbation ([address] Acute on chronic congestive heart failure, unspecified heart failure type ([address] ESRD (end stage renal disease) ([address] and Depression, unspecified depression type were also pertinent to this visit. Past Medical History:  has a past medical history of Ambulatory dysfunction, Aortic stenosis, Arthritis, Asthma, Bilateral hilar adenopathy syndrome, CAD (coronary artery disease), Cardiomyopathy ([address] CHF (congestive heart failure) ([address] Chronic pain, COPD (chronic obstructive pulmonary disease) ([address] Depression, Diabetes mellitus ([address] Difficult intravenous access, Emphysema of lung ([address] ESRD (end stage renal disease) on dialysis ([address] Fear of needles, Gastric ulcer, GERD (gastroesophageal reflux disease), Heart valve problem, Hemodialysis patient ([address] History of spinal fracture, Hx of blood clots, Hyperlipidemia, Hypertension, MI (myocardial infarction) ([address] Neuromuscular disorder ([address] Numbness and tingling in left arm, Pneumonia, PONV (postoperative nausea and vomiting), Prolonged emergence from general anesthesia, Sleep apnea, Stroke ([address] TIA (transient ischemic attack), and Unspecified diseases of blood and blood-forming organs. Past Surgical History:  has a past surgical history that includes Tonsillectomy; cyst removal (2013); Colonoscopy; Coronary angioplasty with stent (05/26/15); vascular surgery (aprx 2 years ago); Colonoscopy (); Upper gastrointestinal endoscopy (2016);  Upper gastrointestinal endoscopy (01/29/2017); other surgical history (02/01/2017); Dialysis fistula creation (Left, 10/30/2017); Diagnostic Cardiac Cath Lab Procedure; other surgical history (2018); other surgical history (Bilateral, 06/26/2018); pr lap insertion tunneled intraperitoneal catheter (N/A, 9/21/2018); other surgical history (Right, 09/2018); Dialysis fistula creation (Left, 3/27/2019); other surgical history (05/28/2019); Endoscopy, colon, diagnostic; Catheter Removal (Right, 7/2/2019); Aortic valve replacement (N/A, 10/15/2019); Rectal surgery (N/A, 2/24/2022); IR TUNNELED CVC PLACE WO SQ PORT/PUMP > 5 YEARS (3/21/2022); IR TUNNELED CVC PLACE WO SQ PORT/PUMP > 5 YEARS (4/21/2022); IR TUNNELED CVC PLACE WO SQ PORT/PUMP > 5 YEARS (4/26/2022); IR TUNNELED CVC PLACE WO SQ PORT/PUMP > 5 YEARS (6/23/2022); and thoracentesis (N/A, 10/2/2022). Assessment   Body Structures, Functions, Activity Limitations Requiring Skilled Therapeutic Intervention: Decreased functional mobility ; Decreased endurance;Decreased balance;Decreased posture;Decreased strength;Decreased safe awareness  Assessment: Pt admitted to [facility] for acute on chronic respiratory failure. PTA pt lives with spouse in mobile home and reports he is independent with functional mobility and ambulated with rollator houshold distances and used electric scooter for community distances. Pt is able to complete bed mobility with mod I, transfers with CGA to SBA, and ambulate with 20ft with RW and CGA. Pt presents below baseline function and will beneft from skilled PT serivces. Rec. DC to home with 24/7 assist and HHPT when deemed medically appropriate. Seen as co-eval with OT in order to safely assess highest level of function and to optimize functional outcomes. Therapy Prognosis: Fair  Decision Making: Low Complexity  Barriers to Learning:  Motivation  Requires PT Follow-Up: Yes  Activity Tolerance  Activity Tolerance: Patient tolerated evaluation without incident     Plan   Physcial Therapy Plan  General Plan: 3-5 times per week  Current Treatment Recommendations: Strengthening, ROM, Balance training, Gait training, Functional mobility training, Home exercise program, Therapeutic activities, Safety education & training, Patient/Caregiver education & training, Transfer training, Endurance training, Pain management  Safety Devices  Type of Devices: Call light within reach, Patient at risk for falls, Nurse notified, Left in chair, Gait belt (pt adamantly refused use of chair alarm despite education on safety precautions in hospital. RN notified)  Restraints  Restraints Initially in Place: No     Restrictions  Restrictions/Precautions  Restrictions/Precautions: Fall Risk, General Precautions, Up as Tolerated  Required Braces or Orthoses?: No  Position Activity Restriction  Other position/activity restrictions: 3.5L O2     Subjective   Pain: Reports chronic back pain 8-9/10 SAURABH richter in room and states she will notify RN.   General  Chart Reviewed: Yes  Patient assessed for rehabilitation services?: Yes  Response To Previous Treatment: Not applicable  Family / Caregiver Present: No  Referring Practitioner: Mellissa Dhaliwal MD  Referral Date : 12/07/22  Diagnosis: Acute on chronic respiratory failure  Follows Commands: Within Functional Limits  General Comment  Comments: Pt in bed upon arrival, RN cleared for therapy  Subjective  Subjective: pt agreeable to therapy eval with encouragment         Social/Functional History  Social/Functional History  Lives With: Spouse  Type of Home: Mobile home  Home Layout: One level  Home Access: Ramped entrance  Bathroom Shower/Tub: Tub/Shower unit, Walk-in shower  Bathroom Toilet: Standard  Bathroom Equipment: Grab bars in shower, Shower chair  Home Equipment: Oxygen (3L O2)  Has the patient had two or more falls in the past year or any fall with injury in the past year?: Yes (States ~6 falls)  ADL Assistance: Needs assistance (Assistance with socks and Decreased step clearance  Distance (ft): 20 Feet  Assistive Device: Walker, rolling;Gait belt    OutComes Score  AM-PAC Score  AM-PAC Inpatient Mobility Raw Score : 19 (12/07/22 1654)  AM-PAC Inpatient T-Scale Score : 45.44 (12/07/22 1654)  Mobility Inpatient CMS 0-100% Score: 41.77 (12/07/22 1654)  Mobility Inpatient CMS G-Code Modifier : CK (12/07/22 1654)     Goals  Short Term Goals  Time Frame for Short Term Goals: 12/14/22  Short Term Goal 1: Pt will complete bed mobility independently with HOB flat  Short Term Goal 2: pt will complete transfers with mod I  Short Term Goal 3: pt will ambulate 50ft with LRAD and supervision  Short Term Goal 4: pt will participate in 10-12 reps of BLE by 12/10/22  Patient Goals   Patient Goals : \"To go home\"     Attempted Therapy Heart Failure Education this date. Pt inappropriate for education at this time due to :  []Cognition   []Medical Status   [x]Readiness to learn  [x]Refusal   []Language barrier  []Decreased vision/hearing    []No family present     Pt provided with CHF education on previous admission in October 2022. Reports he has no questions and does not need to review CHF education again. Education  Patient Education  Education Given To: Patient  Education Provided: Role of Therapy;Plan of Care;Transfer Training;Equipment  Education Method: Verbal  Barriers to Learning: None  Education Outcome: Verbalized understanding;Continued education needed      Therapy Time   Individual Concurrent Group Co-treatment   Time In 1604         Time Out 1627         Minutes 23         Timed Code Treatment Minutes: 10 Minutes (13 min Eval)       Andrew Abdalla, PT, DPT    If pt is unable to be seen after this session, please let this note serve as discharge summary. Please see case management note for discharge disposition. Thank you.

## 2022-12-07 NOTE — PROGRESS NOTES
Occupational Therapy  Facility/Department: [facility] C4 PCU  Occupational Therapy Initial Assessment & Treatment    Name: Dai Hurst  : 1970  MRN: [id number]  Date of Service: 2022    Discharge Recommendations:  Home with Home health OT, 24 hour supervision or assist  OT Equipment Recommendations  Equipment Needed: No  Other: pt has needed DME     Patient Diagnosis(es): The primary encounter diagnosis was Acute on chronic respiratory failure with hypoxia ([address] Diagnoses of COPD exacerbation ([address] Acute on chronic congestive heart failure, unspecified heart failure type ([address] ESRD (end stage renal disease) ([address] and Depression, unspecified depression type were also pertinent to this visit. Past Medical History:  has a past medical history of Ambulatory dysfunction, Aortic stenosis, Arthritis, Asthma, Bilateral hilar adenopathy syndrome, CAD (coronary artery disease), Cardiomyopathy ([address] CHF (congestive heart failure) ([address] Chronic pain, COPD (chronic obstructive pulmonary disease) ([address] Depression, Diabetes mellitus ([address] Difficult intravenous access, Emphysema of lung ([address] ESRD (end stage renal disease) on dialysis ([address] Fear of needles, Gastric ulcer, GERD (gastroesophageal reflux disease), Heart valve problem, Hemodialysis patient ([address] History of spinal fracture, Hx of blood clots, Hyperlipidemia, Hypertension, MI (myocardial infarction) ([address] Neuromuscular disorder ([address] Numbness and tingling in left arm, Pneumonia, PONV (postoperative nausea and vomiting), Prolonged emergence from general anesthesia, Sleep apnea, Stroke ([address] TIA (transient ischemic attack), and Unspecified diseases of blood and blood-forming organs. Past Surgical History:  has a past surgical history that includes Tonsillectomy; cyst removal (2013); Colonoscopy; Coronary angioplasty with stent (05/26/15); vascular surgery (aprx 2 years ago); Colonoscopy ();  Upper gastrointestinal endoscopy (01/06/2016); Upper gastrointestinal endoscopy (01/29/2017); other surgical history (02/01/2017); Dialysis fistula creation (Left, 10/30/2017); Diagnostic Cardiac Cath Lab Procedure; other surgical history (2018); other surgical history (Bilateral, 06/26/2018); pr lap insertion tunneled intraperitoneal catheter (N/A, 9/21/2018); other surgical history (Right, 09/2018); Dialysis fistula creation (Left, 3/27/2019); other surgical history (05/28/2019); Endoscopy, colon, diagnostic; Catheter Removal (Right, 7/2/2019); Aortic valve replacement (N/A, 10/15/2019); Rectal surgery (N/A, 2/24/2022); IR TUNNELED CVC PLACE WO SQ PORT/PUMP > 5 YEARS (3/21/2022); IR TUNNELED CVC PLACE WO SQ PORT/PUMP > 5 YEARS (4/21/2022); IR TUNNELED CVC PLACE WO SQ PORT/PUMP > 5 YEARS (4/26/2022); IR TUNNELED CVC PLACE WO SQ PORT/PUMP > 5 YEARS (6/23/2022); and thoracentesis (N/A, 10/2/2022). Assessment   Performance deficits / Impairments: Decreased functional mobility ; Decreased ADL status; Decreased strength;Decreased safe awareness;Decreased endurance;Decreased balance;Decreased high-level IADLs;Decreased coordination  Assessment: Pt is a 46 yo M who presented to [facility] w/ increased SOB, h/o CHF. Pt lives w/ spouse in mobile home w/ ramped entrance. Pt reports occasional assist needed for LB ADLs and ambulates w/ rollator in home and uses electric scooter in community; still an active . Upon eval, pt completed bed mob w/ Shanda and func mob/transfers w/ CGA and RW. Pt refused all ADL participation, stating he did not need to \"do that in front of [therapists]\". Pt voiced understanding of CHF ed from most recent admission and declined continued education (see OT CHF ed below) Pt is functioning slightly below his baseline and will benefit from inpatient OT services to address above deficits. Rec home w/ PRN assist and HHOT.   Prognosis: Fair  Decision Making: Low Complexity  REQUIRES OT FOLLOW-UP: Yes  Activity Tolerance  Activity Tolerance: Patient Tolerated treatment well;Patient limited by fatigue        AM-PAC Daily Activity Inpatient   How much help for putting on and taking off regular lower body clothing?: A Lot  How much help for Bathing?: A Little  How much help for Toileting?: A Little  How much help for putting on and taking off regular upper body clothing?: A Little  How much help for taking care of personal grooming?: None  How much help for eating meals?: None  AM-PAC Inpatient Daily Activity Raw Score: 19  AM-PAC Inpatient ADL T-Scale Score : 40.22  ADL Inpatient CMS 0-100% Score: 42.8  ADL Inpatient CMS G-Code Modifier : CK     Plan   Occupational Therapy Plan  Times Per Week: 2-3x/wk  Current Treatment Recommendations: Strengthening, Balance training, Functional mobility training, Endurance training, Safety education & training, Pain management, Positioning, Self-Care / ADL, Home management training, Patient/Caregiver education & training     Restrictions  Restrictions/Precautions  Restrictions/Precautions: Fall Risk, General Precautions, Up as Tolerated  Required Braces or Orthoses?: No  Position Activity Restriction  Other position/activity restrictions: 3.5L O2    Subjective   General  Chart Reviewed: Yes, Orders, Progress Notes, History and Physical  Patient assessed for rehabilitation services?: Yes  Subjective  Subjective: pt resting in bed upon arrival, agreeable to OT/PT eval  Pain: Reports chronic back pain 8-9/10 SAURABH richter in room and states she will notify RN.     Social/Functional History  Social/Functional History  Lives With: Spouse  Type of Home: Mobile home  Home Layout: One level  Home Access: Ramped entrance  Bathroom Shower/Tub: Tub/Shower unit, Walk-in shower  Bathroom Toilet: Standard  Bathroom Equipment: Grab bars in shower, Shower chair  Home Equipment: Oxygen (3L O2)  Has the patient had two or more falls in the past year or any fall with injury in the past year?: Yes (States ~6 falls)  ADL Assistance: Needs assistance (Assistance with socks and underware PRN)  Homemaking Responsibilities: No ([employer] will deliver groceries)  Ambulation Assistance: Needs assistance (Uses rollator in home, scooter for community and longer distances)  Transfer Assistance: Independent  Active : Yes  Occupation: Retired  Type of Occupation:   Leisure & Hobbies: Watch TV     Objective   Heart Rate: 62  [address]: Monitor  BP: 126/78  BP Location: Right upper arm  BP Method: Automatic  Patient Position: Lying left side  MAP (Calculated): 94  Resp: 18  SpO2: 99 %  O2 Device: Nasal cannula  Comment: 3.5L O2       Observation/Palpation  Posture: Fair  Edema: BLE edema  Safety Devices  Type of Devices: Call light within reach; Patient at risk for falls;Nurse notified; Left in chair;Gait belt (pt adamantly refused use of chair alarm despite education on safety precautions in hospital. RN notified)  Restraints  Restraints Initially in Place: No  Bed Mobility Training  Bed Mobility Training: Yes  Supine to Sit: Modified independent  Sit to Supine: Other (comment) (pt in chair at EOS)  Balance  Sitting: Intact  Standing: With support (from RW)  Standing - Static: Fair;Constant support  Standing - Dynamic: Fair;Constant support  Transfer Training  Transfer Training: Yes  Interventions: Verbal cues; Safety awareness training  Sit to Stand: Contact-guard assistance; Adaptive equipment (w/ RW)  Stand to Sit: Stand-by assistance; Adaptive equipment (w/ RW)  Bed to Chair: Contact-guard assistance; Adaptive equipment (w/ RW)  Gait Training  Gait Training: Yes  Gait  Overall Level of Assistance: Contact-guard assistance; Adaptive equipment (pt ambulated around foot of bed w/ CGA and RW. pt refused additional ambulation/OOB activity)  Interventions: Verbal cues; Safety awareness training  Base of Support: Widened  Speed/Ольга: Pace decreased (< 100 feet/min); Shuffled; Slow  Step Length: Right shortened;Left shortened  Gait Abnormalities: Decreased step clearance  Distance (ft): 20 Feet  Assistive Device: Walker, rolling;Gait belt     AROM: Generally decreased, functional  PROM: Generally decreased, functional  Strength: Generally decreased, functional  Coordination: Generally decreased, functional    ADL  Additional Comments: pt refused all ADL participation this date, stated \"I think that's weird for me to do in front of you, I'm not comfortable\". pt may benefit from education on adaptive dressing equipment if willing to accept education      Vision  Vision: Impaired  Vision Exceptions: Wears glasses for reading  Hearing  Hearing: Within functional limits    Cognition  Overall Cognitive Status: [facility]  Orientation  Overall Orientation Status: Within Functional Limits  Orientation Level: Oriented X4      Education Given To: Patient  Education Provided: Role of Therapy;Plan of Care;Precautions;IADL Safety;Transfer Training;ADL Adaptive Strategies; Fall Prevention Strategies  Education Provided Comments: disease specific: see OT CHF ed below  Education Method: Verbal  Barriers to Learning: None  Education Outcome: Verbalized understanding;Continued education needed    Attempted Therapy Heart Failure Education this date. Pt inappropriate for education at this time due to :  []Cognition   []Medical Status   [x]Readiness to learn  [x]Refusal   []Language barrier  []Decreased vision/hearing    []No family present   []Other: attempted OT CHF education on previous admission 10/20/2022, pt refused education at that time as well. Should this change during treatment, education will be initiated. If family/ pt's support system is present at subsequent therapy session, will attempt to initiate education.       Goals  Short Term Goals  Time Frame for Short Term Goals: 1 week (12/14) unless otherwise noted  Short Term Goal 1: Pt will complete func transfers w/ SPV and LRAD (12/10)  Short Term Goal 2: Pt will complete toileting w/ SBA and LRAD  Short Term Goal 3: Pt will complete LB dressing w/ Manny and LRAD  Short Term Goal 4: Pt will ambulate household distance w/ SBA and LRAD  Patient Goals   Patient goals : \"I want to be able to breathe\"       Therapy Time   Individual Concurrent Group Co-treatment   Time In 1604         Time Out 1627         Minutes 23         Timed Code Treatment Minutes: 13 Minutes (10 min eval)     If pt is unable to be seen after this session, please let this note serve as discharge summary. Please see case management note for discharge disposition. Thank you.      Jaimee Mckenna OTR/L

## 2022-12-07 NOTE — CARE COORDINATION
Comments    States that he needs new CPAP/NIV              Inpatient consult to Case Management: Patient Communication     Not Released  Not seen     Order Questions    Question Answer   Reason for Consult?  Assistance with [facility] (equip, diagnosis, lgth of need in instructions)     Referral made to [address] [facility]

## 2022-12-07 NOTE — PROGRESS NOTES
12/07/22 0340   NIV Type   Equipment Type v60   Mode Bilevel   Mask Type Full face mask   Mask Size Medium   Settings/Measurements   PIP Observed 17 cm H20   IPAP 16 cmH20   CPAP/EPAP 8 cmH2O   Vt (Measured) 633 mL   Rate Ordered 16   Resp 17   FiO2  30 %   I Time/ I Time % 1.05 s   Minute Volume (L/min) 10 Liters   Mask Leak (lpm) 29 lpm   Comfort Level Good   Using Accessory Muscles No   SpO2 100   Patient's Home Machine No   Alarm Settings   Alarms On Y   Low Pressure (cmH2O) 6 cmH2O   High Pressure (cmH2O) 30 cmH2O   Apnea (secs) 20 secs   RR Low (bpm) 6   RR High (bpm) 40 br/min

## 2022-12-07 NOTE — PROGRESS NOTES
12/07/22 0000   NIV Type   $NIV $Daily Charge   Equipment Type v60   Mode Bilevel   Mask Type Full face mask   Mask Size Medium   Settings/Measurements   PIP Observed 16 cm H20   IPAP 16 cmH20   CPAP/EPAP 8 cmH2O   Vt (Measured) 598 mL   Rate Ordered 16   Resp 16   FiO2  30 %   I Time/ I Time % 1.05 s   Minute Volume (L/min) 10 Liters   Mask Leak (lpm) 28 lpm   Comfort Level Good   Using Accessory Muscles No   SpO2 99   Patient's Home Machine No   Alarm Settings   Alarms On Y   Low Pressure (cmH2O) 6 cmH2O   High Pressure (cmH2O) 30 cmH2O   Apnea (secs) 20 secs   RR Low (bpm) 6   RR High (bpm) 40 br/min

## 2022-12-07 NOTE — PROGRESS NOTES
CC:ESRD  HPI     44 y/o male with history of ESRD admitted with acute onset of shortness of breath. He was recently admitted with pneumonia. He says since discharge he has been having fevers and night sweats. He has been compliant with dialysis and he did got on Saturday 12/3. He had 3 liter UF on 12/4 with post weight of 99 kg which is below his prior target of 101kg.       SUBJECTIVE  On O2      SOC: no visitors      Scheduled Meds:   apixaban  2.5 mg Oral BID    calcium acetate  1 capsule Oral TID WC    clopidogrel  75 mg Oral Daily    DULoxetine  30 mg Oral Daily    isosorbide dinitrate  20 mg Oral TID    metoprolol succinate  100 mg Oral BID    pantoprazole  40 mg Oral QAM AC    pravastatin  40 mg Oral Daily    QUEtiapine  50 mg Oral Nightly    sodium chloride flush  5-40 mL IntraVENous 2 times per day    insulin lispro  0-4 Units SubCUTAneous TID WC    insulin lispro  0-4 Units SubCUTAneous Nightly     Continuous Infusions:   sodium chloride      dextrose       PRN Meds:calcium carbonate, gabapentin, sodium chloride flush, sodium chloride, ondansetron **OR** ondansetron, polyethylene glycol, acetaminophen **OR** acetaminophen, glucose, dextrose bolus **OR** dextrose bolus, glucagon (rDNA), dextrose, oxyCODONE-acetaminophen, labetalol, labetalol, ipratropium-albuterol      Objective:      Physical Exam     Wt Readings from Last 3 Encounters:   12/07/22 245 lb 6 oz (111.3 kg)   12/02/22 220 lb (99.8 kg)   11/28/22 253 lb 8.5 oz (115 kg)     Temp Readings from Last 3 Encounters:   12/07/22 97.6 °F (36.4 °C) (Oral)   12/03/22 97 °F (36.1 °C)   11/28/22 98.1 °F (36.7 °C)     BP Readings from Last 3 Encounters:   12/07/22 126/78   12/03/22 (!) 156/75   11/28/22 132/78     Pulse Readings from Last 3 Encounters:   12/07/22 63   12/03/22 91   11/28/22 63    General:  NAD, A+Ox3, ill-appearing, normal body habitus  HEENT:  PERRL, EOMI  Neck:  Supple, normal range of movement  Chest: On bilevel, no wheezing   CV: Tachycardic, no rub   Abdomen:  NTND, soft, +BS, no hepatosplenomegaly  Extremities:  No peripheral edema  Neurological:  Moving all four extremities, CN II-XII grossly intact  Lymphatics:  No palpable lymph nodes  Skin:  No rash, no jaundice  Psychiatric:  Alert, flat affect  Access:  Left [facility]           Lab Review   Lab Results   Component Value Date    WBC 13.4 (H) 12/04/2022    HGB 8.6 (L) 12/04/2022    HCT 26.2 (L) 12/04/2022    MCV 90.7 12/04/2022     12/04/2022     Lab Results   Component Value Date/Time     12/05/2022 06:00 AM    K 4.9 12/05/2022 06:00 AM    K 3.8 12/02/2022 11:54 PM    CL 97 12/05/2022 06:00 AM    CO2 17 12/05/2022 06:00 AM    BUN 54 12/05/2022 06:00 AM    CREATININE 7.1 12/05/2022 06:00 AM    GLUCOSE 220 12/05/2022 06:00 AM    CALCIUM 8.6 12/05/2022 06:00 AM            Patient Active Problem List    Diagnosis Date Noted    Hypotension due to drugs 11/25/2017    Acute on chronic combined systolic and diastolic heart failure ([address]     Ischemic cardiomyopathy     Dyslipidemia     DM2 (diabetes mellitus, type 2) ([address] 03/04/2014    Bicuspid aortic valve 06/03/2013    CHF (congestive heart failure) ([address] 05/14/2013    CAD (coronary artery disease) 05/14/2013    PVD (peripheral vascular disease) ([address] 05/14/2013    Compression atelectasis 12/06/2022    Acute on chronic respiratory failure ([address] 12/04/2022    Acute pneumonia 11/25/2022    Hypervolemia 10/19/2022    Pericardial effusion 09/28/2022    HFrEF (heart failure with reduced ejection fraction) ([address] 09/24/2022    Acute respiratory failure with hypoxia and hypercapnia ([address] 09/23/2022    Hypertensive emergency 07/17/2022    SOB (shortness of breath) 07/17/2022    Altered mental status     Hypoglycemia 05/29/2022    Morbid obesity with BMI of 40.0-44.9, adult ([facility] 04/26/2022    Former smoker 04/19/2022    Cardiomyopathy ([facility] 04/19/2022    History of transcatheter aortic valve replacement (TAVR) 10/19/2019    Uncontrolled hypertension 11/14/2013    Asthma-COPD overlap syndrome ([address] 05/14/2013    Respiratory failure ([address] 04/18/2022    Pulmonary edema with diastolic CHF, NYHA class 3 ([address] 03/17/2022    Liberty-rectal abscess     Somnolence     Atrial flutter ([address]     SIRS (systemic inflammatory response syndrome) ([address] 02/21/2022    NSTEMI (non-ST elevated myocardial infarction) ([address] 01/12/2022    Acute respiratory failure with hypercapnia ([address] 11/30/2021    Acute pulmonary edema ([address] 11/30/2021    Grade II diastolic dysfunction 00/88/7589    Shock circulatory ([address] 11/30/2021    Smoker 11/30/2021    Normocytic normochromic anemia 11/30/2021    Respiratory failure with hypoxia ([address] 11/29/2021    Speech problem     Urinary tract infection with hematuria     Acute CVA (cerebrovascular accident) ([address] 09/07/2021    Acute hypoxemic respiratory failure ([address] 08/12/2021    Type 2 diabetes mellitus with hyperglycemia ([address] 06/27/2021    Acute encephalopathy 06/27/2021    Cellulitis and abscess of hand 04/24/2021    Iliac artery occlusion, right (HCC)     Cellulitis of right foot 01/29/2021    Peripheral vascular occlusive disease ([address] 01/02/2021    Ataxia     Weakness of both lower extremities 12/30/2020    Sinus bradycardia 12/30/2020    Sinus pause     GBS (Guillain-Creighton syndrome) ([facility])     Bilateral leg weakness 12/04/2020    Bradycardia 10/19/2019    Syncope and collapse 10/19/2019    PAF (paroxysmal atrial fibrillation) ([address]     Hypercholesteremia 07/30/2019    Chronic bronchitis ([address] 05/21/2019    Nasal congestion 05/21/2019    Chronic, continuous use of opioids 04/15/2019    Steal syndrome of dialysis vascular access (HCC)     SOB (shortness of breath) on exertion 12/24/2018    Anemia of chronic disease 11/12/2018    Pulmonary edema 11/09/2018    Fluid overload 11/09/2018    Renovascular hypertension     Mixed hyperlipidemia     Cigarette nicotine dependence in remission     Neuromuscular disorder [facility]     Acute diastolic CHF (congestive heart failure) ([facility] 03/18/2018    Hemodialysis-associated hypotension 11/22/2017    ESRD (end stage renal disease) on dialysis ([address] 11/22/2017    Mucus plugging of bronchi     Nonrheumatic aortic valve stenosis     Pleural effusion on left     Chronic anemia     History of CVA (cerebrovascular accident)     Type 2 diabetes mellitus without complication, without long-term current use of insulin ([facility])     ZAINAB (obstructive sleep apnea)     Tobacco abuse 05/21/2017    CVA (cerebral vascular accident) ([address] 05/21/2017    Angina, class IV ([address]     Dyspnea     Gastroparesis due to DM ([address]     Biliary colic 12/42/3809    Symptomatic cholelithiasis 01/04/2017    Degeneration of lumbar or lumbosacral intervertebral disc 03/18/2016    Lumbar radiculopathy 03/18/2016    Lumbosacral spondylosis without myelopathy 03/18/2016    ZAINAB on CPAP 02/11/2016    Obesity (BMI 30-39. 9)     PNA (pneumonia) 03/28/2015    Depression with anxiety 06/05/2014    Passive smoke exposure 05/30/2014    Diabetic neuropathy ([address] 11/14/2013    Claudication in peripheral vascular disease ([address] 06/26/2013    Bilateral hilar adenopathy syndrome 06/03/2013    Sepsis without acute organ dysfunction ([address] 12/25/2012       ASSESSMENT AND PLAN   #ESRD:On dialysis TTS at [facility]  Prior target weight of 102 kg but after recent hospitalization he was down to 99 kg ( outpatient )  -Has working left [address] Prior fistula with dialysis associated steal syndrome and he had severe needle phobia so it was not used and ligated  -for HD today, orders reviewed  #Shortness of breath:  Recent pneumonia. Having fevers at home and WBC elevated; Rx for PNA  -CXR with unchanged left effusion and worsening right patchy airspace disease, possible pneumonia.   Possible pulmonary edema in setting of hypertension and systolic heart failure.   -po fluid restriction    #Anemia of chronic disease:  Getting DAVON at dialysis but with recent admission and frequent lab draws he is below baseline   #Hypertension:  Range improving

## 2022-12-08 ENCOUNTER — APPOINTMENT (OUTPATIENT)
Dept: GENERAL RADIOLOGY | Age: 52
DRG: 280 | End: 2022-12-08
Payer: MEDICARE

## 2022-12-08 PROBLEM — I21.A1 TYPE 2 MYOCARDIAL INFARCTION (HCC): Status: ACTIVE | Noted: 2022-12-08

## 2022-12-08 LAB
ANION GAP SERPL CALCULATED.3IONS-SCNC: 10 MMOL/L (ref 3–16)
BLOOD CULTURE, ROUTINE: NORMAL
BUN BLDV-MCNC: 51 MG/DL (ref 7–20)
CALCIUM SERPL-MCNC: 8.7 MG/DL (ref 8.3–10.6)
CHLORIDE BLD-SCNC: 97 MMOL/L (ref 99–110)
CO2: 26 MMOL/L (ref 21–32)
CREAT SERPL-MCNC: 5.3 MG/DL (ref 0.9–1.3)
CULTURE, BLOOD 2: NORMAL
GFR SERPL CREATININE-BSD FRML MDRD: 12 ML/MIN/{1.73_M2}
GLUCOSE BLD-MCNC: 133 MG/DL (ref 70–99)
GLUCOSE BLD-MCNC: 135 MG/DL (ref 70–99)
GLUCOSE BLD-MCNC: 138 MG/DL (ref 70–99)
GLUCOSE BLD-MCNC: 148 MG/DL (ref 70–99)
GLUCOSE BLD-MCNC: 174 MG/DL (ref 70–99)
MAGNESIUM: 1.8 MG/DL (ref 1.8–2.4)
PERFORMED ON: ABNORMAL
POTASSIUM SERPL-SCNC: 5 MMOL/L (ref 3.5–5.1)
SODIUM BLD-SCNC: 133 MMOL/L (ref 136–145)

## 2022-12-08 PROCEDURE — 2500000003 HC RX 250 WO HCPCS: Performed by: INTERNAL MEDICINE

## 2022-12-08 PROCEDURE — 36415 COLL VENOUS BLD VENIPUNCTURE: CPT

## 2022-12-08 PROCEDURE — 2060000000 HC ICU INTERMEDIATE R&B

## 2022-12-08 PROCEDURE — 99223 1ST HOSP IP/OBS HIGH 75: CPT | Performed by: INTERNAL MEDICINE

## 2022-12-08 PROCEDURE — 99232 SBSQ HOSP IP/OBS MODERATE 35: CPT | Performed by: INTERNAL MEDICINE

## 2022-12-08 PROCEDURE — 6370000000 HC RX 637 (ALT 250 FOR IP): Performed by: INTERNAL MEDICINE

## 2022-12-08 PROCEDURE — 83735 ASSAY OF MAGNESIUM: CPT

## 2022-12-08 PROCEDURE — 94660 CPAP INITIATION&MGMT: CPT

## 2022-12-08 PROCEDURE — 2580000003 HC RX 258: Performed by: INTERNAL MEDICINE

## 2022-12-08 PROCEDURE — 71046 X-RAY EXAM CHEST 2 VIEWS: CPT

## 2022-12-08 PROCEDURE — 80048 BASIC METABOLIC PNL TOTAL CA: CPT

## 2022-12-08 RX ADMIN — SODIUM CHLORIDE, PRESERVATIVE FREE 8 ML: 5 INJECTION INTRAVENOUS at 09:03

## 2022-12-08 RX ADMIN — CALCIUM ACETATE 667 MG: 667 CAPSULE ORAL at 17:54

## 2022-12-08 RX ADMIN — DULOXETINE HYDROCHLORIDE 30 MG: 30 CAPSULE, DELAYED RELEASE ORAL at 08:56

## 2022-12-08 RX ADMIN — OXYCODONE AND ACETAMINOPHEN 1 TABLET: 7.5; 325 TABLET ORAL at 08:56

## 2022-12-08 RX ADMIN — CALCIUM ACETATE 667 MG: 667 CAPSULE ORAL at 08:56

## 2022-12-08 RX ADMIN — ISOSORBIDE DINITRATE 20 MG: 20 TABLET ORAL at 14:25

## 2022-12-08 RX ADMIN — APIXABAN 2.5 MG: 2.5 TABLET, FILM COATED ORAL at 20:35

## 2022-12-08 RX ADMIN — CLOPIDOGREL BISULFATE 75 MG: 75 TABLET ORAL at 08:56

## 2022-12-08 RX ADMIN — ISOSORBIDE DINITRATE 20 MG: 20 TABLET ORAL at 08:56

## 2022-12-08 RX ADMIN — QUETIAPINE FUMARATE 50 MG: 25 TABLET ORAL at 20:35

## 2022-12-08 RX ADMIN — CALCIUM ACETATE 667 MG: 667 CAPSULE ORAL at 13:48

## 2022-12-08 RX ADMIN — PRAVASTATIN SODIUM 40 MG: 40 TABLET ORAL at 08:56

## 2022-12-08 RX ADMIN — METOPROLOL SUCCINATE 100 MG: 50 TABLET, EXTENDED RELEASE ORAL at 08:56

## 2022-12-08 RX ADMIN — OXYCODONE AND ACETAMINOPHEN 1 TABLET: 7.5; 325 TABLET ORAL at 20:35

## 2022-12-08 RX ADMIN — ISOSORBIDE DINITRATE 20 MG: 20 TABLET ORAL at 20:35

## 2022-12-08 RX ADMIN — APIXABAN 2.5 MG: 2.5 TABLET, FILM COATED ORAL at 08:56

## 2022-12-08 RX ADMIN — METOPROLOL SUCCINATE 100 MG: 50 TABLET, EXTENDED RELEASE ORAL at 20:35

## 2022-12-08 ASSESSMENT — PAIN SCALES - GENERAL: PAINLEVEL_OUTOF10: 8

## 2022-12-08 NOTE — CONSULTS
[address] [facility]   Cardiovascular Evaluation    PATIENT: Sallie Lugo  DATE: 2022  MRN: [id number]  CSN: [id number]  : 1970    Primary Care Doctor/Referring provider: Pcp No, No admitting provider for patient encounter. Reason for evaluation/Chief complaint:   Shortness of Breath (Pt to ED with c/o SOB for about an hour and half PTA. Pt arrives on bipap. Pt given 125 mg solumedrol IM and duoneb en route. Wears 2-3 L O2 at home. On arrival for EMS pt was 80% on 5 L.)    Subjective:    History of present illness on initial date of evaluation:   Sallie Lugo is a 45 y.o. patient who presents for the evaluation of worsening shortness of breath. Patient has an extensive history of nonischemic cardiomyopathy in conjunction with prior aortic valvular disease s/p TAVR. Patient also has end-stage renal disease and is on hemodialysis. Patient states that he developed shortness of breath for the past several days. Patient states that the shortness of breath was worse when he would try to lay flat. Patient states that he cannot catch his breath and is very limited capability from exercise tolerance. This is all progressed over the past several days. The patient states that his shortness of breath is associated with significant exertional limitations. He states that the shortness of breath has gotten to the point where he cannot catch his breath even at rest.  He states that he feels like he is suffocating. Patient to have presented to the hospital and has been subsequently evaluated and treated by the medical and pulmonary service. Cardiology is asked to see the patient for further recommendations and management regarding his cardiovascular health. Patient denies any current chest pain. She does state that at times he gets exertional chest pain. He does have an extensive history of ischemic heart disease.   He did undergo coronary revascularization with drug-eluting stent to the right coronary artery earlier this year. Patient has been somewhat noncompliant with follow-up and medical intervention.     Patient Active Problem List   Diagnosis    Sepsis without acute organ dysfunction ([facility])    CHF (congestive heart failure) ([facility])    Asthma-COPD overlap syndrome ([facility])    CAD (coronary artery disease)    PVD (peripheral vascular disease) ([facility]    Bicuspid aortic valve    Bilateral hilar adenopathy syndrome    Claudication in peripheral vascular disease ([facility])    Uncontrolled hypertension    Diabetic neuropathy ([facility])    DM2 (diabetes mellitus, type 2) ([facility])    Passive smoke exposure    Depression with anxiety    PNA (pneumonia)    Obesity (BMI 30-39.9)    ZAINAB on CPAP    Degeneration of lumbar or lumbosacral intervertebral disc    Lumbar radiculopathy    Lumbosacral spondylosis without myelopathy    Biliary colic    Symptomatic cholelithiasis    Gastroparesis due to DM ([facility])    Angina, class IV ([facility])    Dyspnea    Dyslipidemia    Acute on chronic combined systolic and diastolic heart failure ([facility])    Ischemic cardiomyopathy    Tobacco abuse    CVA (cerebral vascular accident) ([facility] [address]    History of CVA (cerebrovascular accident)    Type 2 diabetes mellitus without complication, without long-term current use of insulin ([facility])    ZAINAB (obstructive sleep apnea)    Pleural effusion on left    Chronic anemia    Nonrheumatic aortic valve stenosis    Mucus plugging of bronchi    Hemodialysis-associated hypotension    ESRD (end stage renal disease) on dialysis ([facility])    Hypotension due to drugs    Acute diastolic CHF (congestive heart failure) ([facility])    Neuromuscular disorder ([facility])    Renovascular hypertension    Mixed hyperlipidemia    Cigarette nicotine dependence in remission    Pulmonary edema    Fluid overload    Anemia of chronic disease    SOB (shortness of breath) on exertion    Steal syndrome of dialysis vascular access ([facility])    Chronic, continuous use of opioids    Chronic bronchitis ([facility] [address] Nasal congestion    Hypercholesteremia    Bradycardia    History of transcatheter aortic valve replacement (TAVR)    Syncope and collapse    PAF (paroxysmal atrial fibrillation) ([facility])    Bilateral leg weakness    GBS (Guillain-[place] syndrome) ([facility])    Sinus pause    Weakness of both lower extremities    Sinus bradycardia    Ataxia    Peripheral vascular occlusive disease ([facility])    Cellulitis of right foot    Iliac artery occlusion, right ([facility])    Cellulitis and abscess of hand    Type 2 diabetes mellitus with hyperglycemia ([facility])    Acute encephalopathy    Acute hypoxemic respiratory failure ([facility])    Acute CVA (cerebrovascular accident) ([address]    Speech problem    Urinary tract infection with hematuria    Respiratory failure with hypoxia ([address]    Acute respiratory failure with hypercapnia ([facility])    Acute pulmonary edema ([facility])    Grade II diastolic dysfunction    Shock circulatory ([facility])    Smoker    Normocytic normochromic anemia    NSTEMI (non-ST elevated myocardial infarction) ([address]    SIRS (systemic inflammatory response syndrome) ([facility])    Atrial flutter ([address]    [place]-rectal abscess    Somnolence    Pulmonary edema with diastolic CHF, NYHA class 3 ([address]    Respiratory failure ([facility])    Former smoker    Cardiomyopathy ([address]    Morbid obesity with BMI of 40.0-44.9, adult ([address]    Hypoglycemia    Altered mental status    Hypertensive emergency    SOB (shortness of breath)    Acute respiratory failure with hypoxia and hypercapnia ([facility])    HFrEF (heart failure with reduced ejection fraction) ([facility])    Pericardial effusion    Hypervolemia    Acute pneumonia    Acute on chronic respiratory failure ([facility])    Compression atelectasis         Cardiac Testing: I have reviewed the findings below.   EKG:  ECHO:   STRESS TEST:  CATH:  BYPASS:  VASCULAR:    Past Medical History:   has a past medical history of Ambulatory dysfunction, Aortic stenosis, Arthritis, Asthma, Bilateral hilar adenopathy syndrome, CAD (coronary artery disease), Cardiomyopathy ([address] CHF (congestive heart failure) ([address] Chronic pain, COPD (chronic obstructive pulmonary disease) ([address] Depression, Diabetes mellitus ([address] Difficult intravenous access, Emphysema of lung ([address] ESRD (end stage renal disease) on dialysis ([address] Fear of needles, Gastric ulcer, GERD (gastroesophageal reflux disease), Heart valve problem, Hemodialysis patient ([address] History of spinal fracture, Hx of blood clots, Hyperlipidemia, Hypertension, MI (myocardial infarction) ([address] Neuromuscular disorder ([address] Numbness and tingling in left arm, Pneumonia, PONV (postoperative nausea and vomiting), Prolonged emergence from general anesthesia, Sleep apnea, Stroke ([address] TIA (transient ischemic attack), and Unspecified diseases of blood and blood-forming organs. Surgical History:   has a past surgical history that includes Tonsillectomy; cyst removal (08/14/2013); Colonoscopy; Coronary angioplasty with stent (05/26/15); vascular surgery (aprx 2 years ago); Colonoscopy (2/29/2015); Upper gastrointestinal endoscopy (01/06/2016); Upper gastrointestinal endoscopy (01/29/2017); other surgical history (02/01/2017); Dialysis fistula creation (Left, 10/30/2017); Diagnostic Cardiac Cath Lab Procedure; other surgical history (2018); other surgical history (Bilateral, 06/26/2018); pr lap insertion tunneled intraperitoneal catheter (N/A, 9/21/2018); other surgical history (Right, 09/2018); Dialysis fistula creation (Left, 3/27/2019); other surgical history (05/28/2019); Endoscopy, colon, diagnostic; Catheter Removal (Right, 7/2/2019); Aortic valve replacement (N/A, 10/15/2019); Rectal surgery (N/A, 2/24/2022); IR TUNNELED CVC PLACE WO SQ PORT/PUMP > 5 YEARS (3/21/2022); IR TUNNELED CVC PLACE WO SQ PORT/PUMP > 5 YEARS (4/21/2022); IR TUNNELED CVC PLACE WO SQ PORT/PUMP > 5 YEARS (4/26/2022); IR TUNNELED CVC PLACE WO SQ PORT/PUMP > 5 YEARS (6/23/2022); and thoracentesis (N/A, 10/2/2022).      Social History:   reports that he quit smoking about 2 years ago. His smoking use included cigarettes. He has a 16.50 pack-year smoking history. He has never used smokeless tobacco. He reports that he does not currently use alcohol. He reports that he does not use drugs. Family History:  No evidence for sudden cardiac death or premature CAD    Medications:  Reviewed and are listed in nursing record. and/or listed below  Outpatient Medications:  Prior to Admission medications    Medication Sig Start Date End Date Taking? Authorizing Provider   apixaban (ELIQUIS) 2.5 MG TABS tablet Take 1 tablet by mouth 2 times daily 11/1/22   JAY Ramirez CNP   gabapentin (NEURONTIN) 100 MG capsule Take 2 capsules by mouth 3 times daily as needed (neuropathic pain) for up to 10 days.  10/20/22 10/30/22  Nikolas Segovia MD   metoprolol succinate (TOPROL XL) 100 MG extended release tablet Take 1 tablet by mouth in the morning and at bedtime 7/21/22   Mercedez Hu MD   cyclobenzaprine (FLEXERIL) 5 MG tablet Muscle spasms 7/21/22   Mercedez Hu MD   QUEtiapine (SEROQUEL) 50 MG tablet TAKE 1 TABLET BY MOUTH EVERY EVENING 6/30/22   Dayami Pastor MD   isosorbide dinitrate (ISORDIL) 20 MG tablet Take 1 tablet by mouth 3 times daily 6/8/22   JASE Denton   clopidogrel (PLAVIX) 75 MG tablet Take 1 tablet by mouth daily 5/9/22   JAY Ramirez CNP   pantoprazole (PROTONIX) 40 MG tablet TAKE 1 TABLET BY MOUTH EVERY MORNING BEFORE BREAKFAST 4/28/22   Dayami Pastor MD   docusate sodium (DOK) 100 MG capsule Take 1 capsule by mouth daily  Patient taking differently: Take 100 mg by mouth as needed 4/27/22   Ny Brooks MD   DULoxetine (CYMBALTA) 30 MG extended release capsule TAKE 1 CAPSULE BY MOUTH EVERY DAY 3/29/22   Dayami Pastor MD   sennosides-docusate sodium (SENOKOT-S) 8.6-50 MG tablet Take 1 tablet by mouth daily  Patient taking differently: Take 1 tablet by mouth as needed 3/9/22   Cristiano Ramos Thamas Fothergill, MD   pravastatin (PRAVACHOL) 40 MG tablet Take 1 tablet by mouth daily 2/10/22   JAY Schneider - CNP   B Complex-C-Folic Acid (VIRT-CAPS) 1 MG CAPS TAKE 1 CAPSULE BY MOUTH EVERY DAY 9/20/21   Caio Martínez MD   Calcium Acetate, Phos Binder, 667 MG CAPS TAKE 1 CAPSULE BY MOUTH THREE TIMES DAILY WITH MEALS 8/12/21   Caio Martínez MD   nitroGLYCERIN (NITROSTAT) 0.4 MG SL tablet DISSOLVE 1 TABLET UNDER THE TONGUE AS NEEDED FOR CHEST PAIN EVERY 5 MINUTES UP TO 3 TIMES. IF NO RELIEF CALL 911. 1/7/21   Caio Martínez MD   vitamin D (ERGOCALCIFEROL) 28060 units CAPS capsule TK 1 C PO WEEKLY 6/2/19   Historical Provider, MD   Alcohol Swabs PADS USE AS DIRECTED 4/25/18   Guerrero Fajardo MD   ipratropium-albuterol (DUONEB) 0.5-2.5 (3) MG/3ML SOLN nebulizer solution Inhale 3 mLs into the lungs every 6 hours as needed for Shortness of Breath 10/15/17   Fanny Garrett MD   calcium carbonate (TUMS) 500 MG chewable tablet Take 1 tablet by mouth 3 times daily as needed for Heartburn. Historical Provider, MD       In-patient schedule medications:   apixaban  2.5 mg Oral BID    calcium acetate  1 capsule Oral TID WC    clopidogrel  75 mg Oral Daily    DULoxetine  30 mg Oral Daily    isosorbide dinitrate  20 mg Oral TID    metoprolol succinate  100 mg Oral BID    pantoprazole  40 mg Oral QAM AC    pravastatin  40 mg Oral Daily    QUEtiapine  50 mg Oral Nightly    sodium chloride flush  5-40 mL IntraVENous 2 times per day    insulin lispro  0-4 Units SubCUTAneous TID WC    insulin lispro  0-4 Units SubCUTAneous Nightly         Infusion Medications:   sodium chloride      dextrose           Allergies:  Morphine     Review of Systems:   14 point review of systems unable to be completed due to patient condition/cooperation. All available positives mentioned in history of present illness.          Physical Examination:    [unfilled]  /74   Pulse 69   Temp 97.9 °F (36.6 °C) (Oral)   Resp 19   Ht 5' 9\" (1.753 m)   Wt 218 lb 7.6 oz (99.1 kg)   SpO2 98%   BMI 32.26 kg/m²    Weight: 218 lb 7.6 oz (99.1 kg)     Wt Readings from Last 3 Encounters:   12/07/22 218 lb 7.6 oz (99.1 kg)   12/02/22 220 lb (99.8 kg)   11/28/22 253 lb 8.5 oz (115 kg)       Intake/Output Summary (Last 24 hours) at 12/8/2022 1526  Last data filed at 12/8/2022 0416  Gross per 24 hour   Intake 622 ml   Output 5200 ml   Net -4578 ml       General Appearance:  Alert, cooperative, mild distress, appears stated age   Head:  Normocephalic, without obvious abnormality, atraumatic   Eyes:  PERRL, conjunctiva/corneas clear       Nose: Nares normal, no drainage or sinus tenderness   Throat: Lips, mucosa, and tongue normal   Neck: Supple, symmetrical, trachea midline, no adenopathy, thyroid: not enlarged, symmetric, no tenderness/mass/nodules, no carotid bruit or JVD       Lungs:   Reduced to auscultation bilaterally, respirations mildly labored   Chest Wall:  No tenderness or deformity   Heart:  Regular rhythm and normal rate; distant sounds   Abdomen:   Soft, obese, non-tender, bowel sounds active all four quadrants,  no masses, no organomegaly           Extremities: Extremities normal, atraumatic, no cyanosis. + edema   Pulses: diminished   Skin: Skin color, texture, turgor normal, no rashes or lesions   Pysch: Normal mood and affect   Neurologic: Normal gross motor exam. Limited feeling in the feet and ankles. Labs  Recent Labs     12/07/22  1820   WBC 6.2   HGB 8.6*   HCT 26.1*   MCV 91.3        Recent Labs     12/07/22  1820 12/08/22  0901   CREATININE 5.7* 5.3*   BUN 57* 51*    133*   K 4.3 5.0   CL 97* 97*   CO2 24 26     No results for input(s): INR, PROTIME in the last 72 hours. No results for input(s): TROPONINI in the last 72 hours. Invalid input(s): PRO-BNP  No results for input(s): CHOL, LDL, HDL in the last 72 hours.     Invalid input(s): TG      Imaging:  I have reviewed the below testing personally and my interpretation is below. EKG:  Sinus tachycardia with frequent and consecutive Premature ventricular complexes and Fusion complexesLow voltage QRSNonspecific ST and T wave abnormalityAbnormal ECGConfirmed by Mami Valderrama MD, Wayne Hernandez ([id number]) on 12/4/2022 2:58:06 PM    CXR:      Assessment:  45 y.o. patient with:  1. Acute on chronic respiratory failure with hypoxia  2. Renovascular hypertension  3. History of TAVR  4. Ischemic and nonischemic cardiomyopathy  5. Type 2 myocardial infarction      Plan:  I suspect the patient's presentation of acute hypoxic respiratory failure is definitely attributed to fluid overload from the patient's cardiac condition. ~This may be further worsened by pleural effusions and other respiratory ailments. ~Patient did have a large left pleural effusion on CT scanning several weeks ago. Will defer to the pulmonary team regarding evaluation and management of this condition   ~Limited echocardiogram to assess for any pericardial effusion  Unfortunately due to the patient's renal failure, diuretic therapy is poorly functional for this patient. Hemodialysis to target weight  Elevated troponin levels are from demand ischemia and poor clearance. Optimal blood pressure control. All questions and concerns were addressed to the patient/family. Alternatives to my treatment were discussed. The note was completed using EMR. Every effort was made to ensure accuracy; however, inadvertent computerized transcription errors may be present.     Linh Jimenez MD, [id number], [place], [place]  [phone] [facility]  [phone] Main central  12/8/2022  3:26 PM

## 2022-12-08 NOTE — FLOWSHEET NOTE
Treatment time: 3 hours  Net UF: 4800 ml     Pre weight: 104.5 kg   Post weight: 99.9 kg  EDW: 99 kg     Access used: [facility] TDC  Access function: Good with -400 ml/min     Medications or blood products given: N/A     Regular outpatient schedule: MWF     Summary of response to treatment:    12/07/22 1810 12/07/22 2106   Vital Signs   BP (!) 142/76 (!) 154/82   Temp 97 °F (36.1 °C) 98.6 °F (37 °C)   Heart Rate 68 64   Resp 18 18   Weight 230 lb 6.1 oz (104.5 kg) 220 lb 3.8 oz (99.9 kg)   Percent Weight Change -6.11 -4.4   Pain Assessment   Pain Assessment None - Denies Pain None - Denies Pain   Post-Hemodialysis Assessment   Post-Treatment Procedures  --  Blood returned;Catheter capped, clamped and heparinized x 2 ports   Machine Disinfection Process  --  Acid/Vinegar Clean;Heat Disinfect; Exterior Machine Disinfection   Rinseback Volume (ml)  --  400 ml   Blood Volume Processed (Liters)  --  61.3 l/min   Dialyzer Clearance  --  Moderately streaked   Duration of Treatment (minutes)  --  176 minutes   Hemodialysis Intake (ml)  --  400 ml   Hemodialysis Output (ml)  --  5200 ml   NET Removed (ml)  --  4800   Tolerated Treatment  --  Good   Time Off  --  2106   Copy of dialysis treatment record placed in chart, to be scanned into EMR. Report called to [facility] EDGAR MUÑOZ RN.

## 2022-12-08 NOTE — PROGRESS NOTES
CC:ESRD  HPI     46 y/o male with history of ESRD admitted with acute onset of shortness of breath. He was recently admitted with pneumonia. He says since discharge he has been having fevers and night sweats.   He has been compliant with dialysis     SUBJECTIVE  Reached 99.9 kg last evening at HD ( DW=99)      SOC: no visitors      Scheduled Meds:   apixaban  2.5 mg Oral BID    calcium acetate  1 capsule Oral TID WC    clopidogrel  75 mg Oral Daily    DULoxetine  30 mg Oral Daily    isosorbide dinitrate  20 mg Oral TID    metoprolol succinate  100 mg Oral BID    pantoprazole  40 mg Oral QAM AC    pravastatin  40 mg Oral Daily    QUEtiapine  50 mg Oral Nightly    sodium chloride flush  5-40 mL IntraVENous 2 times per day    insulin lispro  0-4 Units SubCUTAneous TID WC    insulin lispro  0-4 Units SubCUTAneous Nightly     Continuous Infusions:   sodium chloride      dextrose       PRN Meds:calcium carbonate, gabapentin, sodium chloride flush, sodium chloride, ondansetron **OR** ondansetron, polyethylene glycol, acetaminophen **OR** acetaminophen, glucose, dextrose bolus **OR** dextrose bolus, glucagon (rDNA), dextrose, oxyCODONE-acetaminophen, labetalol, labetalol, ipratropium-albuterol      Objective:      Physical Exam     Wt Readings from Last 3 Encounters:   12/07/22 218 lb 7.6 oz (99.1 kg)   12/02/22 220 lb (99.8 kg)   11/28/22 253 lb 8.5 oz (115 kg)     Temp Readings from Last 3 Encounters:   12/08/22 97.5 °F (36.4 °C) (Axillary)   12/03/22 97 °F (36.1 °C)   11/28/22 98.1 °F (36.7 °C)     BP Readings from Last 3 Encounters:   12/08/22 115/70   12/03/22 (!) 156/75   11/28/22 132/78     Pulse Readings from Last 3 Encounters:   12/08/22 56   12/03/22 91   11/28/22 63    General:  NAD, A+Ox3, ill-appearing, normal body habitus  HEENT:  PERRL, EOMI  Neck:  Supple, normal range of movement  Chest: On bilevel, no wheezing   CV:  Tachycardic, no rub   Abdomen:  NTND, soft, +BS, no hepatosplenomegaly  Extremities: No peripheral edema  Neurological:  Moving all four extremities, CN II-XII grossly intact  Lymphatics:  No palpable lymph nodes  Skin:  No rash, no jaundice  Psychiatric:  Alert, flat affect  Access:  Left [facility]           Lab Review   Lab Results   Component Value Date    WBC 6.2 12/07/2022    HGB 8.6 (L) 12/07/2022    HCT 26.1 (L) 12/07/2022    MCV 91.3 12/07/2022     12/07/2022     Lab Results   Component Value Date/Time     12/07/2022 06:20 PM    K 4.3 12/07/2022 06:20 PM    K 3.8 12/02/2022 11:54 PM    CL 97 12/07/2022 06:20 PM    CO2 24 12/07/2022 06:20 PM    BUN 57 12/07/2022 06:20 PM    CREATININE 5.7 12/07/2022 06:20 PM    GLUCOSE 135 12/07/2022 06:20 PM    CALCIUM 8.2 12/07/2022 06:20 PM            Patient Active Problem List    Diagnosis Date Noted    Hypotension due to drugs 11/25/2017    Acute on chronic combined systolic and diastolic heart failure (HCC)     Ischemic cardiomyopathy     Dyslipidemia     DM2 (diabetes mellitus, type 2) ([facility] 03/04/2014    Bicuspid aortic valve 06/03/2013    CHF (congestive heart failure) ([facility] 05/14/2013    CAD (coronary artery disease) 05/14/2013    PVD (peripheral vascular disease) ([address] 05/14/2013    Compression atelectasis 12/06/2022    Acute on chronic respiratory failure ([address] 12/04/2022    Acute pneumonia 11/25/2022    Hypervolemia 10/19/2022    Pericardial effusion 09/28/2022    HFrEF (heart failure with reduced ejection fraction) ([address] 09/24/2022    Acute respiratory failure with hypoxia and hypercapnia ([address] 09/23/2022    Hypertensive emergency 07/17/2022    SOB (shortness of breath) 07/17/2022    Altered mental status     Hypoglycemia 05/29/2022    Morbid obesity with BMI of 40.0-44.9, adult ([address] 04/26/2022    Former smoker 04/19/2022    Cardiomyopathy ([facility] 04/19/2022    History of transcatheter aortic valve replacement (TAVR) 10/19/2019    Uncontrolled hypertension 11/14/2013    Asthma-COPD overlap syndrome ([facility] 05/14/2013    Respiratory failure ([address] 04/18/2022    Pulmonary edema with diastolic CHF, NYHA class 3 ([address] 03/17/2022    [place]-rectal abscess     Somnolence     Atrial flutter ([address]     SIRS (systemic inflammatory response syndrome) ([address] 02/21/2022    NSTEMI (non-ST elevated myocardial infarction) ([address] 01/12/2022    Acute respiratory failure with hypercapnia ([address] 11/30/2021    Acute pulmonary edema ([address] 11/30/2021    Grade II diastolic dysfunction 26/39/5322    Shock circulatory ([address] 11/30/2021    Smoker 11/30/2021    Normocytic normochromic anemia 11/30/2021    Respiratory failure with hypoxia ([address] 11/29/2021    Speech problem     Urinary tract infection with hematuria     Acute CVA (cerebrovascular accident) ([address] 09/07/2021    Acute hypoxemic respiratory failure ([address] 08/12/2021    Type 2 diabetes mellitus with hyperglycemia ([address] 06/27/2021    Acute encephalopathy 06/27/2021    Cellulitis and abscess of hand 04/24/2021    Iliac artery occlusion, right (HCC)     Cellulitis of right foot 01/29/2021    Peripheral vascular occlusive disease ([address] 01/02/2021    Ataxia     Weakness of both lower extremities 12/30/2020    Sinus bradycardia 12/30/2020    Sinus pause     GBS (Guillain-[place] syndrome) ([facility])     Bilateral leg weakness 12/04/2020    Bradycardia 10/19/2019    Syncope and collapse 10/19/2019    PAF (paroxysmal atrial fibrillation) ([address]     Hypercholesteremia 07/30/2019    Chronic bronchitis ([address] 05/21/2019    Nasal congestion 05/21/2019    Chronic, continuous use of opioids 04/15/2019    Steal syndrome of dialysis vascular access (HCC)     SOB (shortness of breath) on exertion 12/24/2018    Anemia of chronic disease 11/12/2018    Pulmonary edema 11/09/2018    Fluid overload 11/09/2018    Renovascular hypertension     Mixed hyperlipidemia     Cigarette nicotine dependence in remission     Neuromuscular disorder ([address]     Acute diastolic CHF (congestive heart failure) ([facility] 03/18/2018    Hemodialysis-associated hypotension 11/22/2017 ESRD (end stage renal disease) on dialysis ([address] 11/22/2017    Mucus plugging of bronchi     Nonrheumatic aortic valve stenosis     Pleural effusion on left     Chronic anemia     History of CVA (cerebrovascular accident)     Type 2 diabetes mellitus without complication, without long-term current use of insulin (HCC)     ZAINAB (obstructive sleep apnea)     Tobacco abuse 05/21/2017    CVA (cerebral vascular accident) ([address] 05/21/2017    Angina, class IV ([address]     Dyspnea     Gastroparesis due to DM ([address]     Biliary colic 66/73/1732    Symptomatic cholelithiasis 01/04/2017    Degeneration of lumbar or lumbosacral intervertebral disc 03/18/2016    Lumbar radiculopathy 03/18/2016    Lumbosacral spondylosis without myelopathy 03/18/2016    ZAINAB on CPAP 02/11/2016    Obesity (BMI 30-39. 9)     PNA (pneumonia) 03/28/2015    Depression with anxiety 06/05/2014    Passive smoke exposure 05/30/2014    Diabetic neuropathy ([address] 11/14/2013    Claudication in peripheral vascular disease ([address] 06/26/2013    Bilateral hilar adenopathy syndrome 06/03/2013    Sepsis without acute organ dysfunction ([address] 12/25/2012       ASSESSMENT AND PLAN   #ESRD:On dialysis TTS at [facility]  Prior target weight of 102 kg but after recent hospitalization he was down to 99 kg ( outpatient )  -Has working left [facility]. Prior fistula with dialysis associated steal syndrome and he had severe needle phobia so it was not used and ligated  -for HD today, orders reviewed  #Shortness of breath:multifactorial  Recent pneumonia.     Recurrent L pleural effusion  -pulmonary following; agree that he may need pleurodesis, or pleurX  -po fluid restriction , decrease DW at HD as tolerated   #Anemia of chronic disease:  Getting DAVON at dialysis  #Hypertension:  Range improving     Discussed with dr Adrian Driver

## 2022-12-08 NOTE — PROGRESS NOTES
Hospitalist Progress Note      PCP: Pcp No    Date of Admission: 12/4/2022    Chief Complaint: [address] Course: reviewed     Subjective:  sob improved from admission,  pt wanting to go to SNF, used bipap overnight, currently on 4L oxygen      Medications:  Reviewed    Infusion Medications    sodium chloride      dextrose       Scheduled Medications    apixaban  2.5 mg Oral BID    calcium acetate  1 capsule Oral TID WC    clopidogrel  75 mg Oral Daily    DULoxetine  30 mg Oral Daily    isosorbide dinitrate  20 mg Oral TID    metoprolol succinate  100 mg Oral BID    pantoprazole  40 mg Oral QAM AC    pravastatin  40 mg Oral Daily    QUEtiapine  50 mg Oral Nightly    sodium chloride flush  5-40 mL IntraVENous 2 times per day    insulin lispro  0-4 Units SubCUTAneous TID WC    insulin lispro  0-4 Units SubCUTAneous Nightly     PRN Meds: calcium carbonate, gabapentin, sodium chloride flush, sodium chloride, ondansetron **OR** ondansetron, polyethylene glycol, acetaminophen **OR** acetaminophen, glucose, dextrose bolus **OR** dextrose bolus, glucagon (rDNA), dextrose, oxyCODONE-acetaminophen, labetalol, labetalol, ipratropium-albuterol      Intake/Output Summary (Last 24 hours) at 12/8/2022 1033  Last data filed at 12/8/2022 0416  Gross per 24 hour   Intake 622 ml   Output 5200 ml   Net -4578 ml       Physical Exam Performed:    /70   Pulse 56   Temp 97.5 °F (36.4 °C) (Axillary)   Resp 17   Ht 5' 9\" (1.753 m)   Wt 218 lb 7.6 oz (99.1 kg)   SpO2 97%   BMI 32.26 kg/m²     General appearance:  no distress, appears stated age and cooperative. HEENT: Pupils equal, round, and reactive to light. Conjunctivae/corneas clear. Neck: Supple, with full range of motion. No jugular venous distention. Trachea midline. Respiratory:  improved respiratory effort. Clear to auscultation, bilaterally without Rales/Wheezes/Rhonchi.   Cardiovascular: Regular rate and rhythm with normal S1/S2 without murmurs, rubs or gallops. Abdomen: Soft, non-tender, non-distended with normal bowel sounds. Musculoskeletal: No clubbing, cyanosis or edema bilaterally. Full range of motion without deformity. Skin: Skin color, texture, turgor normal.  No rashes or lesions. Neurologic:  Neurovascularly intact without any focal sensory/motor deficits. Cranial nerves: II-XII intact, grossly non-focal.  Psychiatric: Alert and oriented, thought content appropriate, normal insight  Capillary Refill: Brisk, 3 seconds, normal   Peripheral Pulses: +2 palpable, equal bilaterally          Labs:   Recent Labs     12/07/22  1820   WBC 6.2   HGB 8.6*   HCT 26.1*        Recent Labs     12/07/22  1820 12/08/22  0901    133*   K 4.3 5.0   CL 97* 97*   CO2 24 26   BUN 57* 51*   CREATININE 5.7* 5.3*   CALCIUM 8.2* 8.7   PHOS 3.6  --      No results for input(s): AST, ALT, BILIDIR, BILITOT, ALKPHOS in the last 72 hours. No results for input(s): INR in the last 72 hours. No results for input(s): [place] in the last 72 hours. Urinalysis:      Lab Results   Component Value Date/Time    NITRU Negative 12/04/2022 01:19 PM    WBCUA 6-9 12/04/2022 01:19 PM    BACTERIA 3+ 05/29/2022 12:51 PM    RBCUA 0-2 12/04/2022 01:19 PM    BLOODU TRACE-INTACT 12/04/2022 01:19 PM    SPECGRAV 1.020 12/04/2022 01:19 PM    GLUCOSEU 500 12/04/2022 01:19 PM       Radiology:  XR CHEST (2 VW)   Final Result   Stable left pleural effusion with associated airspace change in the left   lower lobe. Improved aeration in the right lung. XR CHEST PORTABLE   Final Result   Worsening aeration in the right lower lung zone, otherwise no significant   change compared with 12/02/2022.              IP CONSULT TO HOSPITALIST  IP CONSULT TO HEART FAILURE NURSE/COORDINATOR  IP CONSULT TO DIETITIAN  IP CONSULT TO NEPHROLOGY  IP CONSULT TO PULMONOLOGY  IP CONSULT TO CASE MANAGEMENT    Assessment/Plan:    Active Hospital Problems    Diagnosis     Acute on chronic combined systolic and diastolic heart failure (HCC) [I50.43]      Priority: High    Ischemic cardiomyopathy [I25.5]      Priority: High    DM2 (diabetes mellitus, type 2) ([facility] [E11.9]      Priority: High    Compression atelectasis [J98.11]      Priority: Medium    Acute on chronic respiratory failure (HCC) [J96.20]      Priority: Medium    SOB (shortness of breath) [R06.02]      Priority: Medium    Former smoker [D67.728]      Priority: Medium    History of transcatheter aortic valve replacement (TAVR) [Z95.2]      Priority: Medium    Asthma-COPD overlap syndrome ([facility] [J44.9]      Priority: Medium    Grade II diastolic dysfunction [B98.35]     ESRD (end stage renal disease) on dialysis ([facility] [N18.6, Z99.2]     Pleural effusion on left [J90]     Chronic anemia [D64.9]     ZAINAB (obstructive sleep apnea) [G47.33]     Obesity (BMI 30-39. 9) [E66.9]        Acute on chronic respiratory failure with Hypoxia: Recurrent presentation, just here 2 days prior. Frequent admissions for fluid overload and missed dialysis. However, reports compliance with HD this week. Recent treatment for Pneumonia. Currently, he does have mild leukocytosis but lacking other features of pneumonia. - checked Procalcitonin and elevated compared to prior(chronically elevated)  - continued treatment for presumed volume overload/CHF with IV Lasix and HD. Nephrology consulted. May need input from Cardiology and Pulmonology (not yet ordered). He does report using NIPPV at home (unclear if CPAP or BIPAP). Wonder if this may need to be adjusted. -repeat cxr 12/6 noted left effusion/recurrent, pulm consulted(did not rec tap, rather adjust HD)   -cards consulted, ?need for med adjustment  -repeat CXR 12/8    Acute on chronic combined systolic and diastolic heart failure: Known LVEF 20-25%. Continued IV diuresis and HD. Monitor I/O, weights, BMP. Medical management. Diabetes Mellitus, Type 2: Diet Controlled. Recheck HbA1c and 7.0  - Low SSI if needed. Morbid Obesity -  With Body mass index is 32.49 kg/m². Complicating assessment and treatment. Placing patient at risk for multiple co-morbidities as well as early death and contributing to the patient's presentation. Paroxysmal Atrial Fibrillation: Stable. Continued Eliquis 2.5 mg BID     DVT Prophylaxis: Eliquis  Diet: ADULT DIET;  Regular; 1000 ml  Code Status: Full Code  PT/OT Eval Status: home pt/ot     Dispo - pending improved resp status, further nephro/pulm recs,  cards consulted, repeat cxr today, ?adjust fluid removal by HD(defer to nephro on this)    Appropriate for A1 Discharge Unit: No      Anushka Clark MD

## 2022-12-08 NOTE — PROGRESS NOTES
12/08/22 0327   NIV Type   Equipment Type v60   Mode Bilevel   Mask Type Full face mask   Mask Size Medium   Settings/Measurements   PIP Observed 17 cm H20   IPAP 16 cmH20   CPAP/EPAP 8 cmH2O   Vt (Measured) 658 mL   Rate Ordered 16   Resp 18   FiO2  30 %   I Time/ I Time % 1.05 s   Minute Volume (L/min) 11.3 Liters   Mask Leak (lpm) 19 lpm   Comfort Level Good   Using Accessory Muscles No   SpO2 97   Patient's Home Machine No   Alarm Settings   Alarms On Y   Low Pressure (cmH2O) 6 cmH2O   High Pressure (cmH2O) 30 cmH2O   Apnea (secs) 20 secs   RR Low (bpm) 6   RR High (bpm) 40 br/min

## 2022-12-08 NOTE — PROGRESS NOTES
12/07/22 2320   NIV Type   Skin Protection for O2 Device No  (pt does not want mepilex)   NIV Started/Stopped On   Equipment Type v60   Mode Bilevel   Mask Type Full face mask   Mask Size Medium   Settings/Measurements   PIP Observed 16 cm H20   IPAP 16 cmH20   CPAP/EPAP 8 cmH2O   Vt (Measured) 620 mL   Rate Ordered 16   Resp 16   FiO2  30 %   I Time/ I Time % 1.05 s   Minute Volume (L/min) 10 Liters   Mask Leak (lpm) 37 lpm   Comfort Level Good   Using Accessory Muscles No   SpO2 96  (CMU monitoring)   Patient's Home Machine No   Alarm Settings   Alarms On Y   Low Pressure (cmH2O) 6 cmH2O   High Pressure (cmH2O) 30 cmH2O   Apnea (secs) 20 secs   RR Low (bpm) 6   RR High (bpm) 40 br/min

## 2022-12-08 NOTE — PROGRESS NOTES
12/08/22 0014   NIV Type   $NIV $Daily Charge   Equipment Type v60   Mode Bilevel   Mask Type Full face mask   Mask Size Medium   Settings/Measurements   PIP Observed 17 cm H20   IPAP 16 cmH20   CPAP/EPAP 8 cmH2O   Vt (Measured) 512 mL   Rate Ordered 16   Resp 17   FiO2  30 %   I Time/ I Time % 1.05 s   Mask Leak (lpm) 40 lpm   Comfort Level Good   Using Accessory Muscles No   SpO2 97   Patient's Home Machine No   Alarm Settings   Alarms On Y   Low Pressure (cmH2O) 6 cmH2O   High Pressure (cmH2O) 30 cmH2O   Apnea (secs) 20 secs   RR Low (bpm) 6   RR High (bpm) 40 br/min

## 2022-12-08 NOTE — PROGRESS NOTES
INPATIENT PULMONARY CRITICAL CARE PROGRESS NOTE      Reason for visit    persistent respiratory failure, elevated Pro-Russ, need for antibiotics question also left pleural effusion    SUBJECTIVE: Patient when seen this morning was sleeping on the NIV without any significant respiratory distress with no increased work of breathing, patient was afebrile and hemodynamically maintained, patient had dialysis yesterday, patient's glycemic control was slightly on the higher side but acceptable, patient was on 30% oxygen on the BiPAP to maintain saturation of 97% when evaluated, patient had a 5.2 L of fluid removal yesterday as per documentation with cumulative fluid balance of -4.3 L, blood sugars are slightly on the higher side but acceptable, no other pertinent review of system of concern which could be obtained           Physical Exam:  Blood pressure 115/70, pulse 56, temperature 97.5 °F (36.4 °C), temperature source Axillary, resp. rate 17, height 5' 9\" (1.753 m), weight 218 lb 7.6 oz (99.1 kg), SpO2 97 %.'     Constitutional: No respiratory distress on NIV  HENT: BiPAP mask intact no thyromegaly. Eyes:  Conjunctivae are normal. Pupils equal, round, and reactive to light. No scleral icterus. Neck: . No tracheal deviation present. No obvious thyroid mass. Short large neck  Cardiovascular: Normal rate, regular rhythm, normal heart sounds. No right ventricular heave. some lower extremity edema. Pulmonary/Chest: No wheezes. minimal  bilateral rales. Chest wall is not dull to percussion. No accessory muscle usage or stridor. Decreased breath sound intensity(L>R)  Abdominal: Soft. Bowel sounds present. No distension or hernia. No tenderness. Obese  Musculoskeletal: No cyanosis. No clubbing. No obvious joint deformity. Lymphadenopathy: No cervical or supraclavicular adenopathy. Skin: Skin is warm and dry. No rash or nodules on the exposed extremities.   Neurologic sleeping when seen    Results:  CBC:   Recent Labs     12/07/22  1820   WBC 6.2   HGB 8.6*   HCT 26.1*   MCV 91.3          BMP:   Recent Labs     12/07/22  1820 12/08/22  0901    133*   K 4.3 5.0   CL 97* 97*   CO2 24 26   PHOS 3.6  --    BUN 57* 51*   CREATININE 5.7* 5.3*           Imaging:  I have reviewed radiology images personally. XR CHEST (2 VW)   Final Result   Stable left pleural effusion with associated airspace change in the left   lower lobe. Improved aeration in the right lung. XR CHEST PORTABLE   Final Result   Worsening aeration in the right lower lung zone, otherwise no significant   change compared with 12/02/2022. XR CHEST (2 VW)    Result Date: 12/6/2022  EXAMINATION: TWO XRAY VIEWS OF THE CHEST 12/6/2022 8:31 am COMPARISON: 12/04/2022 rate HISTORY: ORDERING SYSTEM PROVIDED HISTORY: sob, resp failure, f/u cxr TECHNOLOGIST PROVIDED HISTORY: Reason for exam:->sob, resp failure, f/u cxr Reason for Exam: sob, resp failure, f/u cxr FINDINGS: Stable vascular catheter on the left. The heart is enlarged. Prior AVR. Relatively stable volume of a moderate left pleural effusion. Dense airspace opacification in the left lower lung zone. Improved aeration of the right lung. No significant skeletal finding. Stable left pleural effusion with associated airspace change in the left lower lobe. Improved aeration in the right lung. XR CHEST PORTABLE    Result Date: 12/4/2022  EXAMINATION: ONE XRAY VIEW OF THE CHEST 12/4/2022 11:21 am COMPARISON: 12/02/2022 and prior HISTORY: ORDERING SYSTEM PROVIDED HISTORY: SOB TECHNOLOGIST PROVIDED HISTORY: Reason for exam:->SOB Reason for Exam: sob FINDINGS: Left dual lumen catheter tip near the cavoatrial junction. Unchanged moderate left pleural effusion with adjacent patchy opacities. Increased patchy opacification overlying the right lower lung zone. Status post TAVR. Lucyann Push Unchanged mild cardiomegaly. Lucyann Push No pneumothorax. No acute osseous abnormality. Aortic knob calcifications. Worsening aeration in the right lower lung zone, otherwise no significant change compared with 12/02/2022. XR CHEST PORTABLE    Result Date: 12/2/2022  EXAMINATION: ONE XRAY VIEW OF THE CHEST 12/2/2022 9:29 pm COMPARISON: Chest 11/25/2022 HISTORY: ORDERING SYSTEM PROVIDED HISTORY: shortness of breath FINDINGS: The cardiac silhouette is enlarged. Sequela from TAVR. Calcifications involving the aorta reflect atherosclerosis. The mediastinal and hilar silhouettes appear unremarkable. Moderate size left pleural effusion with left basilar consolidation obscuring left hemidiaphragm and left heart margin bowel. Vascular engorgement and cephalization is demonstrated with bilateral peribronchial cuffing and perivascular haziness. No focal consolidation or pleural effusion evident on the right. Unchanged left IJ hemodialysis catheter, tip at the superior cavoatrial junction level. No pneumothorax is seen. No acute osseous abnormality is identified. 1.  Congestive heart failure is most likely given the radiographic findings; pneumonia is also a consideration in areas of consolidation with pleural effusion. Similar appearance to prior study. 2. Calcific atherosclerosis aorta. 3. Cardiomegaly. XR CHEST PORTABLE    Result Date: 11/25/2022  EXAMINATION: ONE XRAY VIEW OF THE CHEST 11/25/2022 9:26 am COMPARISON: Chest x-ray dated 18 October 2022 HISTORY: ORDERING SYSTEM PROVIDED HISTORY: Shortness of Breath TECHNOLOGIST PROVIDED HISTORY: Reason for exam:->Shortness of Breath Reason for Exam: SOB FINDINGS: Moderate left pleural effusion with left basilar airspace disease. Right lung is clear. No pneumothorax. No cardiomegaly. Left-sided central venous catheter with the tip in the superior vena cava. Moderate left pleural effusion with left basilar airspace disease. This could represent atelectasis or pneumonia in the appropriate clinical setting.              Assessment:  Principal Problem:    Acute on chronic respiratory failure (HCC)  Active Problems:    DM2 (diabetes mellitus, type 2) ([facility])    Acute on chronic combined systolic and diastolic heart failure (HCC)    Ischemic cardiomyopathy    Asthma-COPD overlap syndrome ([facility])    History of transcatheter aortic valve replacement (TAVR)    Former smoker    SOB (shortness of breath)    Compression atelectasis    Obesity (BMI 30-39.9)    ZAINAB (obstructive sleep apnea)    Pleural effusion on left    Chronic anemia    ESRD (end stage renal disease) on dialysis ([facility])    Grade II diastolic dysfunction  Resolved Problems:    * No resolved hospital problems.  *          Plan:   Oxygen supplementation  to keep saturation being 90-94% only  Please titrate down the oxygen as per the above parameters  NIV papers signed and given to the DME representative  Patient does have a chronic respite failure consequent to COPD; due to severe respiratory disease with chronic hypercapnia, patient may benefit from NIV and patient requires psychiatric volumes, backup battery with alarms to alert the caregivers of increased respiratory, mouthpiece ventilation to aid in ADLs and encourage mobility and the NIV is required to decrease hospital admissions, the patient is at high risk of exacerbation leading to possible decline in harm without risk of therapy  Pulmonary toilet  Patient had undergone thoracentesis done in October of this year and the results are as above-would not recommend any thoracentesis at this time  Patient has recurrence of pleural effusions which can be secondary to the heart failure cardiomyopathy and renal failure   Repeating thoracentesis may not change patient's overall clinical status  Anyhow patient is on Plavix and Eliquis at this time  Patient does have history of GB syndrome in the past-patient will benefit from a repeat PFT as an outpatient to assess for any worsening of obstructive airway disease or restrictive disease  Ideally  speaking patient will also benefit from MIP and MEP  If long-term solution for repeated pleural fluid accumulation needs to be considered-pleurX catheter insertion as per IM continue be an option  HD as per nephrology  No clinical data from pulmonary standpoint of view except assist any antibiotic therapy even though patient may have some elevation of procalcitonin  Antihypertensives as per IM/nephrology   Patient may need dry weight to be modified-nephrology to decide upon that   Optimization of cardiac medications as per cardiology  Bronchodilators  No need for  any systemic steroids at this time  Management of hyperglycemia as per IM  Consider IV Venofer and Retacrit if deemed appropriate  Trend hemodynamics and cardiac rhythm closely  Diet and life style modifications  Cymbalta and Seroquel as per IM to continue  PUD prophylaxis      Case discussed with nursing    ? Discharge planning           Electronically signed by:  Sayda Hermosillo MD    12/8/2022    10:51 AM.

## 2022-12-09 LAB
ALBUMIN SERPL-MCNC: 3.2 G/DL (ref 3.4–5)
ANION GAP SERPL CALCULATED.3IONS-SCNC: 15 MMOL/L (ref 3–16)
BUN BLDV-MCNC: 70 MG/DL (ref 7–20)
CALCIUM SERPL-MCNC: 8.2 MG/DL (ref 8.3–10.6)
CHLORIDE BLD-SCNC: 93 MMOL/L (ref 99–110)
CO2: 22 MMOL/L (ref 21–32)
CREAT SERPL-MCNC: 7.1 MG/DL (ref 0.9–1.3)
GFR SERPL CREATININE-BSD FRML MDRD: 8 ML/MIN/{1.73_M2}
GLUCOSE BLD-MCNC: 135 MG/DL (ref 70–99)
GLUCOSE BLD-MCNC: 140 MG/DL (ref 70–99)
GLUCOSE BLD-MCNC: 159 MG/DL (ref 70–99)
GLUCOSE BLD-MCNC: 170 MG/DL (ref 70–99)
GLUCOSE BLD-MCNC: 173 MG/DL (ref 70–99)
HCT VFR BLD CALC: 25.2 % (ref 40.5–52.5)
HEMOGLOBIN: 8.3 G/DL (ref 13.5–17.5)
MAGNESIUM: 1.8 MG/DL (ref 1.8–2.4)
MCH RBC QN AUTO: 30.1 PG (ref 26–34)
MCHC RBC AUTO-ENTMCNC: 33 G/DL (ref 31–36)
MCV RBC AUTO: 91.2 FL (ref 80–100)
PDW BLD-RTO: 15.8 % (ref 12.4–15.4)
PERFORMED ON: ABNORMAL
PHOSPHORUS: 5.8 MG/DL (ref 2.5–4.9)
PLATELET # BLD: 278 K/UL (ref 135–450)
PMV BLD AUTO: 7.6 FL (ref 5–10.5)
POTASSIUM SERPL-SCNC: 4.9 MMOL/L (ref 3.5–5.1)
RBC # BLD: 2.76 M/UL (ref 4.2–5.9)
SODIUM BLD-SCNC: 130 MMOL/L (ref 136–145)
WBC # BLD: 8.7 K/UL (ref 4–11)

## 2022-12-09 PROCEDURE — 93308 TTE F-UP OR LMTD: CPT

## 2022-12-09 PROCEDURE — 94640 AIRWAY INHALATION TREATMENT: CPT

## 2022-12-09 PROCEDURE — 85027 COMPLETE CBC AUTOMATED: CPT

## 2022-12-09 PROCEDURE — 6370000000 HC RX 637 (ALT 250 FOR IP): Performed by: INTERNAL MEDICINE

## 2022-12-09 PROCEDURE — 2700000000 HC OXYGEN THERAPY PER DAY

## 2022-12-09 PROCEDURE — 90935 HEMODIALYSIS ONE EVALUATION: CPT

## 2022-12-09 PROCEDURE — 6360000002 HC RX W HCPCS

## 2022-12-09 PROCEDURE — 2060000000 HC ICU INTERMEDIATE R&B

## 2022-12-09 PROCEDURE — 99233 SBSQ HOSP IP/OBS HIGH 50: CPT | Performed by: INTERNAL MEDICINE

## 2022-12-09 PROCEDURE — 94660 CPAP INITIATION&MGMT: CPT

## 2022-12-09 PROCEDURE — 80069 RENAL FUNCTION PANEL: CPT

## 2022-12-09 PROCEDURE — 2580000003 HC RX 258: Performed by: INTERNAL MEDICINE

## 2022-12-09 PROCEDURE — 83735 ASSAY OF MAGNESIUM: CPT

## 2022-12-09 PROCEDURE — 94761 N-INVAS EAR/PLS OXIMETRY MLT: CPT

## 2022-12-09 PROCEDURE — 2500000003 HC RX 250 WO HCPCS: Performed by: INTERNAL MEDICINE

## 2022-12-09 PROCEDURE — 36415 COLL VENOUS BLD VENIPUNCTURE: CPT

## 2022-12-09 RX ORDER — HEPARIN SODIUM 1000 [USP'U]/ML
3600 INJECTION, SOLUTION INTRAVENOUS; SUBCUTANEOUS PRN
Status: DISCONTINUED | OUTPATIENT
Start: 2022-12-09 | End: 2022-12-14 | Stop reason: HOSPADM

## 2022-12-09 RX ORDER — HEPARIN SODIUM 1000 [USP'U]/ML
INJECTION, SOLUTION INTRAVENOUS; SUBCUTANEOUS
Status: COMPLETED
Start: 2022-12-09 | End: 2022-12-09

## 2022-12-09 RX ADMIN — HEPARIN SODIUM 3600 UNITS: 1000 INJECTION INTRAVENOUS; SUBCUTANEOUS at 17:04

## 2022-12-09 RX ADMIN — METOPROLOL SUCCINATE 100 MG: 50 TABLET, EXTENDED RELEASE ORAL at 21:04

## 2022-12-09 RX ADMIN — APIXABAN 2.5 MG: 2.5 TABLET, FILM COATED ORAL at 21:04

## 2022-12-09 RX ADMIN — HEPARIN SODIUM 3600 UNITS: 1000 INJECTION, SOLUTION INTRAVENOUS; SUBCUTANEOUS at 17:04

## 2022-12-09 RX ADMIN — IPRATROPIUM BROMIDE AND ALBUTEROL SULFATE 1 AMPULE: 2.5; .5 SOLUTION RESPIRATORY (INHALATION) at 21:00

## 2022-12-09 RX ADMIN — APIXABAN 2.5 MG: 2.5 TABLET, FILM COATED ORAL at 08:13

## 2022-12-09 RX ADMIN — CALCIUM ACETATE 667 MG: 667 CAPSULE ORAL at 18:36

## 2022-12-09 RX ADMIN — QUETIAPINE FUMARATE 50 MG: 25 TABLET ORAL at 21:04

## 2022-12-09 RX ADMIN — ISOSORBIDE DINITRATE 20 MG: 20 TABLET ORAL at 08:13

## 2022-12-09 RX ADMIN — PRAVASTATIN SODIUM 40 MG: 40 TABLET ORAL at 08:12

## 2022-12-09 RX ADMIN — OXYCODONE AND ACETAMINOPHEN 1 TABLET: 7.5; 325 TABLET ORAL at 08:17

## 2022-12-09 RX ADMIN — METOPROLOL SUCCINATE 100 MG: 50 TABLET, EXTENDED RELEASE ORAL at 08:13

## 2022-12-09 RX ADMIN — SODIUM CHLORIDE, PRESERVATIVE FREE 10 ML: 5 INJECTION INTRAVENOUS at 08:16

## 2022-12-09 RX ADMIN — OXYCODONE AND ACETAMINOPHEN 1 TABLET: 7.5; 325 TABLET ORAL at 18:36

## 2022-12-09 RX ADMIN — CALCIUM ACETATE 667 MG: 667 CAPSULE ORAL at 08:13

## 2022-12-09 RX ADMIN — DULOXETINE HYDROCHLORIDE 30 MG: 30 CAPSULE, DELAYED RELEASE ORAL at 08:13

## 2022-12-09 RX ADMIN — EPOETIN ALFA-EPBX 10000 UNITS: 10000 INJECTION, SOLUTION INTRAVENOUS; SUBCUTANEOUS at 14:47

## 2022-12-09 RX ADMIN — CALCIUM ACETATE 667 MG: 667 CAPSULE ORAL at 11:31

## 2022-12-09 RX ADMIN — CLOPIDOGREL BISULFATE 75 MG: 75 TABLET ORAL at 08:13

## 2022-12-09 RX ADMIN — ISOSORBIDE DINITRATE 20 MG: 20 TABLET ORAL at 21:04

## 2022-12-09 RX ADMIN — PANTOPRAZOLE SODIUM 40 MG: 40 TABLET, DELAYED RELEASE ORAL at 08:13

## 2022-12-09 ASSESSMENT — PAIN DESCRIPTION - LOCATION
LOCATION: BACK

## 2022-12-09 ASSESSMENT — PAIN SCALES - GENERAL
PAINLEVEL_OUTOF10: 7
PAINLEVEL_OUTOF10: 10
PAINLEVEL_OUTOF10: 8

## 2022-12-09 NOTE — CARE COORDINATION
CMA started a cert through [employer] and is approved for the patient to go to [facility]. CM is aware.

## 2022-12-09 NOTE — PROGRESS NOTES
CC:ESRD  HPI     44 y/o male with history of ESRD admitted with acute onset of shortness of breath. He was recently admitted with pneumonia. He says since discharge he has been having fevers and night sweats.   He has been compliant with dialysis     SUBJECTIVE  Reached 99.9 kg last HD, hopefully will reach DW of 99 today    SOC: no visitors      Scheduled Meds:   epoetin siddharth-epbx  10,000 Units IntraVENous Once per day on Mon Wed Fri    apixaban  2.5 mg Oral BID    calcium acetate  1 capsule Oral TID WC    clopidogrel  75 mg Oral Daily    DULoxetine  30 mg Oral Daily    isosorbide dinitrate  20 mg Oral TID    metoprolol succinate  100 mg Oral BID    pantoprazole  40 mg Oral QAM AC    pravastatin  40 mg Oral Daily    QUEtiapine  50 mg Oral Nightly    sodium chloride flush  5-40 mL IntraVENous 2 times per day    insulin lispro  0-4 Units SubCUTAneous TID WC    insulin lispro  0-4 Units SubCUTAneous Nightly     Continuous Infusions:   sodium chloride      dextrose       PRN Meds:perflutren lipid microspheres, calcium carbonate, gabapentin, sodium chloride flush, sodium chloride, ondansetron **OR** ondansetron, polyethylene glycol, acetaminophen **OR** acetaminophen, glucose, dextrose bolus **OR** dextrose bolus, glucagon (rDNA), dextrose, oxyCODONE-acetaminophen, labetalol, labetalol, ipratropium-albuterol      Objective:      Physical Exam     Wt Readings from Last 3 Encounters:   12/09/22 220 lb 0.3 oz (99.8 kg)   12/02/22 220 lb (99.8 kg)   11/28/22 253 lb 8.5 oz (115 kg)     Temp Readings from Last 3 Encounters:   12/09/22 97.6 °F (36.4 °C) (Oral)   12/03/22 97 °F (36.1 °C)   11/28/22 98.1 °F (36.7 °C)     BP Readings from Last 3 Encounters:   12/09/22 133/73   12/03/22 (!) 156/75   11/28/22 132/78     Pulse Readings from Last 3 Encounters:   12/09/22 60   12/03/22 91   11/28/22 63    General:  NAD, A+Ox3, ill-appearing, normal body habitus  HEENT:  PERRL, EOMI  Neck:  Supple, normal range of movement  Chest: On bilevel, no wheezing   CV:  Tachycardic, no rub   Abdomen:  NTND, soft, +BS, no hepatosplenomegaly  Extremities:  No peripheral edema  Neurological:  Moving all four extremities, CN II-XII grossly intact  Lymphatics:  No palpable lymph nodes  Skin:  No rash, no jaundice  Psychiatric:  Alert, flat affect  Access:  Left [facility]           Lab Review   Lab Results   Component Value Date    WBC 8.7 12/09/2022    HGB 8.3 (L) 12/09/2022    HCT 25.2 (L) 12/09/2022    MCV 91.2 12/09/2022     12/09/2022     Lab Results   Component Value Date/Time     12/09/2022 05:26 AM    K 4.9 12/09/2022 05:26 AM    K 3.8 12/02/2022 11:54 PM    CL 93 12/09/2022 05:26 AM    CO2 22 12/09/2022 05:26 AM    BUN 70 12/09/2022 05:26 AM    CREATININE 7.1 12/09/2022 05:26 AM    GLUCOSE 135 12/09/2022 05:26 AM    CALCIUM 8.2 12/09/2022 05:26 AM            Patient Active Problem List    Diagnosis Date Noted    Hypotension due to drugs 11/25/2017    Acute on chronic combined systolic and diastolic heart failure (HCC)     Ischemic cardiomyopathy     Dyslipidemia     DM2 (diabetes mellitus, type 2) ([address] 03/04/2014    Bicuspid aortic valve 06/03/2013    CHF (congestive heart failure) ([address] 05/14/2013    CAD (coronary artery disease) 05/14/2013    PVD (peripheral vascular disease) ([address] 05/14/2013    Type 2 myocardial infarction ([address] 12/08/2022    Compression atelectasis 12/06/2022    Acute on chronic respiratory failure ([address] 12/04/2022    Acute pneumonia 11/25/2022    Hypervolemia 10/19/2022    Pericardial effusion 09/28/2022    HFrEF (heart failure with reduced ejection fraction) ([address] 09/24/2022    Acute respiratory failure with hypoxia and hypercapnia ([address] 09/23/2022    Hypertensive emergency 07/17/2022    SOB (shortness of breath) 07/17/2022    Altered mental status     Hypoglycemia 05/29/2022    Morbid obesity with BMI of 40.0-44.9, adult ([facility] 04/26/2022    Former smoker 04/19/2022    Cardiomyopathy ([facility] 04/19/2022 History of transcatheter aortic valve replacement (TAVR) 10/19/2019    Uncontrolled hypertension 11/14/2013    Asthma-COPD overlap syndrome ([facility] 05/14/2013    Respiratory failure ([address] 04/18/2022    Pulmonary edema with diastolic CHF, NYHA class 3 ([address] 03/17/2022    [place]ty-rectal abscess     Somnolence     Atrial flutter ([address]     SIRS (systemic inflammatory response syndrome) ([address] 02/21/2022    NSTEMI (non-ST elevated myocardial infarction) ([address] 01/12/2022    Acute respiratory failure with hypercapnia ([address] 11/30/2021    Acute pulmonary edema ([address] 11/30/2021    Grade II diastolic dysfunction 87/39/8533    Shock circulatory ([address] 11/30/2021    Smoker 11/30/2021    Normocytic normochromic anemia 11/30/2021    Respiratory failure with hypoxia ([address] 11/29/2021    Speech problem     Urinary tract infection with hematuria     Acute CVA (cerebrovascular accident) ([address] 09/07/2021    Acute hypoxemic respiratory failure ([address] 08/12/2021    Type 2 diabetes mellitus with hyperglycemia ([address] 06/27/2021    Acute encephalopathy 06/27/2021    Cellulitis and abscess of hand 04/24/2021    Iliac artery occlusion, right (HCC)     Cellulitis of right foot 01/29/2021    Peripheral vascular occlusive disease ([address] 01/02/2021    Ataxia     Weakness of both lower extremities 12/30/2020    Sinus bradycardia 12/30/2020    Sinus pause     GBS (Guillain-[place] syndrome) ([facility])     Bilateral leg weakness 12/04/2020    Bradycardia 10/19/2019    Syncope and collapse 10/19/2019    PAF (paroxysmal atrial fibrillation) ([address]     Hypercholesteremia 07/30/2019    Chronic bronchitis ([address] 05/21/2019    Nasal congestion 05/21/2019    Chronic, continuous use of opioids 04/15/2019    Steal syndrome of dialysis vascular access (HCC)     SOB (shortness of breath) on exertion 12/24/2018    Anemia of chronic disease 11/12/2018    Pulmonary edema 11/09/2018    Fluid overload 11/09/2018    Renovascular hypertension     Mixed hyperlipidemia     Cigarette nicotine dependence in remission     Neuromuscular disorder (HCC)     Acute diastolic CHF (congestive heart failure) ([address] 03/18/2018    Hemodialysis-associated hypotension 11/22/2017    ESRD (end stage renal disease) on dialysis ([address] 11/22/2017    Mucus plugging of bronchi     Nonrheumatic aortic valve stenosis     Pleural effusion on left     Chronic anemia     History of CVA (cerebrovascular accident)     Type 2 diabetes mellitus without complication, without long-term current use of insulin ([facility])     ZAINAB (obstructive sleep apnea)     Tobacco abuse 05/21/2017    CVA (cerebral vascular accident) ([address] 05/21/2017    Angina, class IV ([address]     Dyspnea     Gastroparesis due to DM ([address]     Biliary colic 37/83/1394    Symptomatic cholelithiasis 01/04/2017    Degeneration of lumbar or lumbosacral intervertebral disc 03/18/2016    Lumbar radiculopathy 03/18/2016    Lumbosacral spondylosis without myelopathy 03/18/2016    ZAINAB on CPAP 02/11/2016    Obesity (BMI 30-39. 9)     PNA (pneumonia) 03/28/2015    Depression with anxiety 06/05/2014    Passive smoke exposure 05/30/2014    Diabetic neuropathy ([address] 11/14/2013    Claudication in peripheral vascular disease ([address] 06/26/2013    Bilateral hilar adenopathy syndrome 06/03/2013    Sepsis without acute organ dysfunction ([address] 12/25/2012       ASSESSMENT AND PLAN   #ESRD:On dialysis TTS at [facility]  Prior target weight of 102 kg but after recent hospitalization he was down to 99 kg ( outpatient )  -Has working left [facility] [place].   Prior fistula with dialysis associated steal syndrome and he had severe needle phobia so it was not used and ligated  -for HD today, orders reviewed  #Shortness of breath:multifactorial-recent pneumonia, recurrent L pleural effusion  -pulmonary following; agree that he may need pleurodesis, or pleurX  -po fluid restriction , decrease DW at HD as tolerated ; currently going for (( kg  No peripheral edema  #Anemia of chronic disease:  - DAVON at dialysis  #Hypertension:  Range improving

## 2022-12-09 NOTE — PROGRESS NOTES
12/09/22 0409   NIV Type   Skin Assessment Clean, dry, & intact   NIV Started/Stopped On   Equipment Type v60   Mode Bilevel   Mask Type Full face mask   Mask Size Medium   Bonnet size Medium   Settings/Measurements   PIP Observed 17 cm H20   IPAP 16 cmH20   CPAP/EPAP 8 cmH2O   Vt (Measured) 550 mL   Rate Ordered 16   Resp 21   FiO2  30 %   I Time/ I Time % 1.05 s   Minute Volume (L/min) 10 Liters   Mask Leak (lpm) 19 lpm   Comfort Level Good   Using Accessory Muscles No   SpO2 97   Patient's Home Machine No   Alarm Settings   Alarms On Y   Low Pressure (cmH2O) 6 cmH2O   High Pressure (cmH2O) 30 cmH2O   Apnea (secs) 20 secs   RR Low (bpm) 6   RR High (bpm) 40 br/min   Oxygen Therapy/Pulse Ox   O2 Therapy Oxygen   O2 Device PAP (positive airway pressure)   SpO2 97 %

## 2022-12-09 NOTE — PROGRESS NOTES
12/09/22 0032   NIV Type   $NIV $Daily Charge   Skin Assessment Clean, dry, & intact   Skin Protection for O2 Device No   NIV Started/Stopped On   Equipment Type v60   Mode Bilevel   Mask Type Full face mask   Mask Size Medium   Bonnet size Medium   Settings/Measurements   PIP Observed 17 cm H20   IPAP 16 cmH20   CPAP/EPAP 8 cmH2O   Vt (Measured) 578 mL   Rate Ordered 16   Resp 18   FiO2  30 %   I Time/ I Time % 1.05 s   Minute Volume (L/min) 12 Liters   Mask Leak (lpm) 30 lpm   Comfort Level Good   Using Accessory Muscles No   SpO2 97   Patient's Home Machine No   Alarm Settings   Alarms On Y   Low Pressure (cmH2O) 6 cmH2O   High Pressure (cmH2O) 30 cmH2O   Delay Alarm 20 sec(s)   Apnea (secs) 20 secs   RR Low (bpm) 6   RR High (bpm) 40 br/min   Oxygen Therapy/Pulse Ox   O2 Therapy Oxygen   O2 Device PAP (positive airway pressure)   SpO2 98 %

## 2022-12-09 NOTE — FLOWSHEET NOTE
12/09/22 1414 12/09/22 1718   Vital Signs   /67 (!) 152/83   Temp 97.8 °F (36.6 °C) 97.8 °F (36.6 °C)   Heart Rate 58 58   Resp 20 20     Treatment time: 3 hours    Net UF: 3.3 L    Pre weight: 101.7 kg (bed scale)  Post weight: 98.4 kg (bed scale)  EDW: 99 kg    Access used: [facility] HD Tunneled cath  Access function: No problems    Medications or blood products given: Retacrit 37450 units IVP    Regular outpatient schedule: [address] [place] [address]    Summary of response to treatment: Tolerated 3 hour HD tx without difficulty. Crit line : H1=30.9, H2=30.9, difference of 0, no refill present. Ending profile A. VSS. Copy of dialysis treatment record placed in chart, to be scanned into EMR.      Report called to Sue Ordoñez RN

## 2022-12-09 NOTE — CARE COORDINATION
Case Management Follow up      Chart reviewed and case discussed during huddle and rounds. Pt is admitted day # 5. Unit C-4. Diagnosis and currently status as per MD progress: ESRD- dialysis today, Nephrology following. Acute on chronic resp. Failure with hypoxia- recurrent. Frequent admissions. Hx of noncompliance. CHF- continue diuresis. CTS consulted for poss. Pleurex. Echo to r/o pericardial effusion. Services following:IM, Nephrology, Cardiology     Anticipated Discharge date: pending course    Expected Plan for Discharge: [employer] pre cert approved today. Will follow for poss. D/c over w/e. Facility aware of need for NIV at d/c. Monika Sadler states that she will arrange. Pre cert needed: yes    Potential Barriers:none    RN CM will continue to follow Pt clinical course for DCP needs.

## 2022-12-09 NOTE — PROGRESS NOTES
[address] [facility]   Cardiovascular Evaluation    PATIENT: Sallie Lugo  DATE: 2022  MRN: [id number]  CSN: [id number]  : 1970    Primary Care Doctor/Referring provider: Pcp No, No admitting provider for patient encounter. Reason for evaluation/Chief complaint:   Shortness of Breath (Pt to ED with c/o SOB for about an hour and half PTA. Pt arrives on bipap. Pt given 125 mg solumedrol IM and duoneb en route. Wears 2-3 L O2 at home. On arrival for EMS pt was 80% on 5 L.)    Subjective: The patient remains short of breath from baseline. He is however improved since admission with current therapies. History of present illness on initial date of evaluation:   Sallie Lugo is a 45 y.o. patient who presents for the evaluation of worsening shortness of breath. Patient has an extensive history of nonischemic cardiomyopathy in conjunction with prior aortic valvular disease s/p TAVR. Patient also has end-stage renal disease and is on hemodialysis. Patient states that he developed shortness of breath for the past several days. Patient states that the shortness of breath was worse when he would try to lay flat. Patient states that he cannot catch his breath and is very limited capability from exercise tolerance. This is all progressed over the past several days. The patient states that his shortness of breath is associated with significant exertional limitations. He states that the shortness of breath has gotten to the point where he cannot catch his breath even at rest.  He states that he feels like he is suffocating. Patient to have presented to the hospital and has been subsequently evaluated and treated by the medical and pulmonary service. Cardiology is asked to see the patient for further recommendations and management regarding his cardiovascular health. Patient denies any current chest pain. She does state that at times he gets exertional chest pain.   He does have an extensive history of ischemic heart disease. He did undergo coronary revascularization with drug-eluting stent to the right coronary artery earlier this year. Patient has been somewhat noncompliant with follow-up and medical intervention.     Patient Active Problem List   Diagnosis    Sepsis without acute organ dysfunction ([facility])    CHF (congestive heart failure) ([facility])    Asthma-COPD overlap syndrome ([facility])    CAD (coronary artery disease)    PVD (peripheral vascular disease) ([facility]    Bicuspid aortic valve    Bilateral hilar adenopathy syndrome    Claudication in peripheral vascular disease ([facility])    Uncontrolled hypertension    Diabetic neuropathy ([facility])    DM2 (diabetes mellitus, type 2) ([facility])    Passive smoke exposure    Depression with anxiety    PNA (pneumonia)    Obesity (BMI 30-39.9)    ZAINAB on CPAP    Degeneration of lumbar or lumbosacral intervertebral disc    Lumbar radiculopathy    Lumbosacral spondylosis without myelopathy    Biliary colic    Symptomatic cholelithiasis    Gastroparesis due to DM ([facility])    Angina, class IV ([facility])    Dyspnea    Dyslipidemia    Acute on chronic combined systolic and diastolic heart failure ([facility])    Ischemic cardiomyopathy    Tobacco abuse    CVA (cerebral vascular accident) ([facility]    History of CVA (cerebrovascular accident)    Type 2 diabetes mellitus without complication, without long-term current use of insulin ([facility])    ZAINAB (obstructive sleep apnea)    Pleural effusion on left    Chronic anemia    Nonrheumatic aortic valve stenosis    Mucus plugging of bronchi    Hemodialysis-associated hypotension    ESRD (end stage renal disease) on dialysis ([facility])    Hypotension due to drugs    Acute diastolic CHF (congestive heart failure) ([facility])    Neuromuscular disorder ([facility])    Renovascular hypertension    Mixed hyperlipidemia    Cigarette nicotine dependence in remission    Pulmonary edema    Fluid overload    Anemia of chronic disease    SOB (shortness of breath) on exertion Steal syndrome of dialysis vascular access (HCC)    Chronic, continuous use of opioids    Chronic bronchitis (HCC)    Nasal congestion    Hypercholesteremia    Bradycardia    History of transcatheter aortic valve replacement (TAVR)    Syncope and collapse    PAF (paroxysmal atrial fibrillation) ([facility])    Bilateral leg weakness    GBS (Guillain-[place] syndrome) (HCC)    Sinus pause    Weakness of both lower extremities    Sinus bradycardia    Ataxia    Peripheral vascular occlusive disease (HCC)    Cellulitis of right foot    Iliac artery occlusion, right ([facility])    Cellulitis and abscess of hand    Type 2 diabetes mellitus with hyperglycemia (HCC)    Acute encephalopathy    Acute hypoxemic respiratory failure (HCC)    Acute CVA (cerebrovascular accident) ([address]    Speech problem    Urinary tract infection with hematuria    Respiratory failure with hypoxia ([address]    Acute respiratory failure with hypercapnia ([facility])    Acute pulmonary edema ([facility])    Grade II diastolic dysfunction    Shock circulatory ([facility])    Smoker    Normocytic normochromic anemia    NSTEMI (non-ST elevated myocardial infarction) ([address]    SIRS (systemic inflammatory response syndrome) ([facility])    Atrial flutter ([address]    [place]-rectal abscess    Somnolence    Pulmonary edema with diastolic CHF, NYHA class 3 ([address]    Respiratory failure (HCC)    Former smoker    Cardiomyopathy ([address]    Morbid obesity with BMI of 40.0-44.9, adult ([address]    Hypoglycemia    Altered mental status    Hypertensive emergency    SOB (shortness of breath)    Acute respiratory failure with hypoxia and hypercapnia (HCC)    HFrEF (heart failure with reduced ejection fraction) ([facility])    Pericardial effusion    Hypervolemia    Acute pneumonia    Acute on chronic respiratory failure (HCC)    Compression atelectasis    Type 2 myocardial infarction [facility])         Cardiac Testing: I have reviewed the findings below.   EKG:  ECHO:   STRESS TEST:  CATH:  BYPASS:  VASCULAR:    Past Medical History:   has a past medical history of Ambulatory dysfunction, Aortic stenosis, Arthritis, Asthma, Bilateral hilar adenopathy syndrome, CAD (coronary artery disease), Cardiomyopathy ([address] CHF (congestive heart failure) ([address] Chronic pain, COPD (chronic obstructive pulmonary disease) ([address] Depression, Diabetes mellitus ([address] Difficult intravenous access, Emphysema of lung ([address] ESRD (end stage renal disease) on dialysis ([address] Fear of needles, Gastric ulcer, GERD (gastroesophageal reflux disease), Heart valve problem, Hemodialysis patient ([address] History of spinal fracture, Hx of blood clots, Hyperlipidemia, Hypertension, MI (myocardial infarction) ([address] Neuromuscular disorder ([address] Numbness and tingling in left arm, Pneumonia, PONV (postoperative nausea and vomiting), Prolonged emergence from general anesthesia, Sleep apnea, Stroke ([address] TIA (transient ischemic attack), and Unspecified diseases of blood and blood-forming organs. Surgical History:   has a past surgical history that includes Tonsillectomy; cyst removal (08/14/2013); Colonoscopy; Coronary angioplasty with stent (05/26/15); vascular surgery (aprx 2 years ago); Colonoscopy (2/29/2015); Upper gastrointestinal endoscopy (01/06/2016); Upper gastrointestinal endoscopy (01/29/2017); other surgical history (02/01/2017); Dialysis fistula creation (Left, 10/30/2017); Diagnostic Cardiac Cath Lab Procedure; other surgical history (2018); other surgical history (Bilateral, 06/26/2018); pr lap insertion tunneled intraperitoneal catheter (N/A, 9/21/2018); other surgical history (Right, 09/2018); Dialysis fistula creation (Left, 3/27/2019); other surgical history (05/28/2019); Endoscopy, colon, diagnostic; Catheter Removal (Right, 7/2/2019); Aortic valve replacement (N/A, 10/15/2019); Rectal surgery (N/A, 2/24/2022); IR TUNNELED CVC PLACE WO SQ PORT/PUMP > 5 YEARS (3/21/2022); IR TUNNELED CVC PLACE WO SQ PORT/PUMP > 5 YEARS (4/21/2022);  IR TUNNELED CVC PLACE WO SQ PORT/PUMP > 5 YEARS (4/26/2022); IR TUNNELED CVC PLACE WO SQ PORT/PUMP > 5 YEARS (6/23/2022); and thoracentesis (N/A, 10/2/2022). Social History:   reports that he quit smoking about 2 years ago. His smoking use included cigarettes. He has a 16.50 pack-year smoking history. He has never used smokeless tobacco. He reports that he does not currently use alcohol. He reports that he does not use drugs. Family History:  No evidence for sudden cardiac death or premature CAD    Medications:  Reviewed and are listed in nursing record. and/or listed below  Outpatient Medications:  Prior to Admission medications    Medication Sig Start Date End Date Taking? Authorizing Provider   apixaban (ELIQUIS) 2.5 MG TABS tablet Take 1 tablet by mouth 2 times daily 11/1/22   JAY Mckenna CNP   gabapentin (NEURONTIN) 100 MG capsule Take 2 capsules by mouth 3 times daily as needed (neuropathic pain) for up to 10 days.  10/20/22 10/30/22  Precious López MD   metoprolol succinate (TOPROL XL) 100 MG extended release tablet Take 1 tablet by mouth in the morning and at bedtime 7/21/22   Duncan Doyle MD   cyclobenzaprine (FLEXERIL) 5 MG tablet Muscle spasms 7/21/22   Duncan Doyle MD   QUEtiapine (SEROQUEL) 50 MG tablet TAKE 1 TABLET BY MOUTH EVERY EVENING 6/30/22   Kathleen Rodriguez MD   isosorbide dinitrate (ISORDIL) 20 MG tablet Take 1 tablet by mouth 3 times daily 6/8/22   JASE Kebede   clopidogrel (PLAVIX) 75 MG tablet Take 1 tablet by mouth daily 5/9/22   JAY Mckenna CNP   pantoprazole (PROTONIX) 40 MG tablet TAKE 1 TABLET BY MOUTH EVERY MORNING BEFORE BREAKFAST 4/28/22   Kathleen Rodriguez MD   docusate sodium (DOK) 100 MG capsule Take 1 capsule by mouth daily  Patient taking differently: Take 100 mg by mouth as needed 4/27/22   Jeremy Hurley MD   DULoxetine (CYMBALTA) 30 MG extended release capsule TAKE 1 CAPSULE BY MOUTH EVERY DAY 3/29/22   Kathleen Rodriguez MD   sennosides-docusate sodium (SENOKOT-S) 8.6-50 MG tablet Take 1 tablet by mouth daily  Patient taking differently: Take 1 tablet by mouth as needed 3/9/22   Kayla Varghese MD   pravastatin (PRAVACHOL) 40 MG tablet Take 1 tablet by mouth daily 2/10/22   JAY Mccrary - CNP   B Complex-C-Folic Acid (VIRT-CAPS) 1 MG CAPS TAKE 1 CAPSULE BY MOUTH EVERY DAY 9/20/21   Kayla Varghese MD   Calcium Acetate, Phos Binder, 667 MG CAPS TAKE 1 CAPSULE BY MOUTH THREE TIMES DAILY WITH MEALS 8/12/21   Kayla Varghese MD   nitroGLYCERIN (NITROSTAT) 0.4 MG SL tablet DISSOLVE 1 TABLET UNDER THE TONGUE AS NEEDED FOR CHEST PAIN EVERY 5 MINUTES UP TO 3 TIMES. IF NO RELIEF CALL 911. 1/7/21   Kayla Varghese MD   vitamin D (ERGOCALCIFEROL) 38345 units CAPS capsule TK 1 C PO WEEKLY 6/2/19   Historical Provider, MD   Alcohol Swabs PADS USE AS DIRECTED 4/25/18   Deisy Umana MD   ipratropium-albuterol (DUONEB) 0.5-2.5 (3) MG/3ML SOLN nebulizer solution Inhale 3 mLs into the lungs every 6 hours as needed for Shortness of Breath 10/15/17   Huma Covington MD   calcium carbonate (TUMS) 500 MG chewable tablet Take 1 tablet by mouth 3 times daily as needed for Heartburn.     Historical Provider, MD       In-patient schedule medications:   epoetin siddharth-epbx  10,000 Units IntraVENous Once per day on Mon Wed Fri    apixaban  2.5 mg Oral BID    calcium acetate  1 capsule Oral TID WC    clopidogrel  75 mg Oral Daily    DULoxetine  30 mg Oral Daily    isosorbide dinitrate  20 mg Oral TID    metoprolol succinate  100 mg Oral BID    pantoprazole  40 mg Oral QAM AC    pravastatin  40 mg Oral Daily    QUEtiapine  50 mg Oral Nightly    sodium chloride flush  5-40 mL IntraVENous 2 times per day    insulin lispro  0-4 Units SubCUTAneous TID WC    insulin lispro  0-4 Units SubCUTAneous Nightly         Infusion Medications:   sodium chloride      dextrose           Allergies:  Morphine     Review of Systems:   14 point review of systems unable to be completed due to patient condition/cooperation. All available positives mentioned in history of present illness. Physical Examination:    [unfilled]  /67   Pulse 57   Temp 97.5 °F (36.4 °C) (Oral)   Resp 18   Ht 5' 9\" (1.753 m)   Wt 220 lb 0.3 oz (99.8 kg)   SpO2 99%   BMI 32.49 kg/m²    Weight: 220 lb 0.3 oz (99.8 kg)     Wt Readings from Last 3 Encounters:   12/09/22 220 lb 0.3 oz (99.8 kg)   12/02/22 220 lb (99.8 kg)   11/28/22 253 lb 8.5 oz (115 kg)     No intake or output data in the 24 hours ending 12/09/22 1322      General Appearance:  Alert, cooperative, mild distress, appears stated age   Head:  Normocephalic, without obvious abnormality, atraumatic   Eyes:  PERRL, conjunctiva/corneas clear       Nose: Nares normal, no drainage or sinus tenderness   Throat: Lips, mucosa, and tongue normal   Neck: Supple, symmetrical, trachea midline, no adenopathy, thyroid: not enlarged, symmetric, no tenderness/mass/nodules, no carotid bruit or JVD       Lungs:   Reduced to auscultation bilaterally, respirations mildly labored   Chest Wall:  No tenderness or deformity   Heart:  Regular rhythm and normal rate; distant sounds   Abdomen:   Soft, obese, non-tender, bowel sounds active all four quadrants,  no masses, no organomegaly           Extremities: Extremities normal, atraumatic, no cyanosis. + edema   Pulses: diminished   Skin: Skin color, texture, turgor normal, no rashes or lesions   Pysch: Normal mood and affect   Neurologic: Normal gross motor exam. Limited feeling in the feet and ankles. Labs  Recent Labs     12/07/22  1820 12/09/22  0526   WBC 6.2 8.7   HGB 8.6* 8.3*   HCT 26.1* 25.2*   MCV 91.3 91.2    278       Recent Labs     12/08/22  0901 12/09/22  0526   CREATININE 5.3* 7.1*   BUN 51* 70*   * 130*   K 5.0 4.9   CL 97* 93*   CO2 26 22       No results for input(s): INR, PROTIME in the last 72 hours. No results for input(s): TROPONINI in the last 72 hours.     Invalid input(s): PRO-BNP  No results for input(s): CHOL, LDL, HDL in the last 72 hours. Invalid input(s): TG      Imaging:  I have reviewed the below testing personally and my interpretation is below. EKG:  Sinus tachycardia with frequent and consecutive Premature ventricular complexes and Fusion complexesLow voltage QRSNonspecific ST and T wave abnormalityAbnormal ECGConfirmed by Jeff Grant MD, Lui Ye ([id number]) on 12/4/2022 2:58:06 PM    CXR:      Assessment:  45 y.o. patient with:  1. Acute on chronic respiratory failure with hypoxia  2. Renovascular hypertension  3. History of TAVR  4. Ischemic and nonischemic cardiomyopathy  5. Type 2 myocardial infarction      Plan:  I suspect the patient's presentation of acute hypoxic respiratory failure is definitely attributed to fluid overload from the patient's cardiac condition. ~This may be further worsened by pleural effusions and other respiratory ailments. ~Patient did have a large left pleural effusion on CT scanning several weeks ago. Will defer to the pulmonary team regarding evaluation and management of this condition   ~Limited echocardiogram to assess for any pericardial effusion  Unfortunately due to the patient's renal failure, diuretic therapy is poorly functional for this patient. Hemodialysis to target weight  Elevated troponin levels are from demand ischemia and poor clearance. Optimal blood pressure control. All questions and concerns were addressed to the patient/family. Alternatives to my treatment were discussed. The note was completed using EMR. Every effort was made to ensure accuracy; however, inadvertent computerized transcription errors may be present.     Musa Kenyon MD, [id number], [address] [facility] office  [phone] [facility]  12/9/2022  1:22 PM

## 2022-12-09 NOTE — PROGRESS NOTES
Consult received. Chart reviewed. 45 y. o. with ESRD, CHF and recurrent pleural effusion, s/p thoracentesis 10/2. Recent imaging with recurrence of left pleural effusion. On 4 L NC. Recommend left thoracentesis to assist with symptom management. Would be unable to place pleur-x in the coming days given Plavix and Eliquis. Patient can follow up in January for re-evaluation and consideration of Pleur-x catheter.

## 2022-12-09 NOTE — PROGRESS NOTES
Comprehensive Nutrition Assessment    Type and Reason for Visit:  Reassess    Nutrition Recommendations/Plan:   Regular diet as tolerated  Fluid restriction per MD  Has patient had bowel movement?; consider scheduled bowel regimen  Offer Boost pudding bid  Will monitor nutritional adequacy, nutrition-related labs, weights, BMs, and clinical progress      Malnutrition Assessment:  Malnutrition Status: At risk for malnutrition (Comment) (12/09/22 1523)      Nutrition Assessment:    Follow up: In HD at this time. On 4 liters nasal cannula, bipap overnight. Continues on a regular diet now with FR 1000 ml/day, Na currently 130 mmol/L this am.  Po intake not recorded consistently this admission. Will add Boost pudding bid and monitor tolerance. Nutrition Related Findings:    Na 130 mmol/L this am; no BM recorded this admission Wound Type: None       Current Nutrition Intake & Therapies:    Average Meal Intake: Unable to assess  Average Supplements Intake: Unable to assess  ADULT DIET; Regular; 1000 ml  ADULT ORAL NUTRITION SUPPLEMENT; Lunch, Dinner; Fortified Pudding Oral Supplement    Anthropometric Measures:  Height: 5' 9\" (175.3 cm)  Ideal Body Weight (IBW): 160 lbs (73 kg)       Current Body Weight: 224 lb 3 oz (101.7 kg),   IBW. Weight Source: Bed Scale  Current BMI (kg/m2): 33.1                          BMI Categories: Obese Class 1 (BMI 30.0-34. 9)    Estimated Daily Nutrient Needs:  Energy Requirements Based On: Kcal/kg  Weight Used for Energy Requirements: Ideal  Energy (kcal/day): 8671-4382 (25-30 kcal/72.7 kg)  Weight Used for Protein Requirements: Ideal  Protein (g/day): 110-131 (1.5-1.8 g/72.7 kg)  Method Used for Fluid Requirements: 1 ml/kcal  Fluid (ml/day):      Nutrition Diagnosis:   Increased nutrient needs related to increase demand for energy/nutrients, impaired respiratory function as evidenced by dialysis (on 4 liters O2 nasal cannula/bipap)    Nutrition Interventions:   Food and/or Nutrient Delivery: Continue Current Diet, Start Oral Nutrition Supplement  Nutrition Education/Counseling: Education not appropriate (monitor need)  Coordination of Nutrition Care: Continue to monitor while inpatient  Plan of Care discussed with: patient    Goals:     Goals: PO intake 50% or greater       Nutrition Monitoring and Evaluation:      Food/Nutrient Intake Outcomes: Food and Nutrient Intake  Physical Signs/Symptoms Outcomes: Nutrition Focused Physical Findings, Biochemical Data    Discharge Planning:     Too soon to determine     CARMEN, [address] [facility], [address] [place] [address],   Contact: [phone]

## 2022-12-09 NOTE — PROGRESS NOTES
Hospitalist Progress Note      PCP: Pcp No    Date of Admission: 12/4/2022    Chief Complaint: [address] Course: reviewed      Subjective:  sob improved from admission,  pt states uses 3-4L oxygen at home,  used bipap during naps and overnight, currently on 4L oxygen         Medications:  Reviewed    Infusion Medications    sodium chloride      dextrose       Scheduled Medications    epoetin siddharth-epbx  10,000 Units IntraVENous Once per day on Mon Wed Fri    apixaban  2.5 mg Oral BID    calcium acetate  1 capsule Oral TID WC    clopidogrel  75 mg Oral Daily    DULoxetine  30 mg Oral Daily    isosorbide dinitrate  20 mg Oral TID    metoprolol succinate  100 mg Oral BID    pantoprazole  40 mg Oral QAM AC    pravastatin  40 mg Oral Daily    QUEtiapine  50 mg Oral Nightly    sodium chloride flush  5-40 mL IntraVENous 2 times per day    insulin lispro  0-4 Units SubCUTAneous TID WC    insulin lispro  0-4 Units SubCUTAneous Nightly     PRN Meds: perflutren lipid microspheres, calcium carbonate, gabapentin, sodium chloride flush, sodium chloride, ondansetron **OR** ondansetron, polyethylene glycol, acetaminophen **OR** acetaminophen, glucose, dextrose bolus **OR** dextrose bolus, glucagon (rDNA), dextrose, oxyCODONE-acetaminophen, labetalol, labetalol, ipratropium-albuterol    No intake or output data in the 24 hours ending 12/09/22 0944    Physical Exam Performed:    /73   Pulse 60   Temp 97.6 °F (36.4 °C) (Oral)   Resp 18   Ht 5' 9\" (1.753 m)   Wt 220 lb 0.3 oz (99.8 kg)   SpO2 100%   BMI 32.49 kg/m²   General appearance:  no distress, appears stated age and cooperative. HEENT: Pupils equal, round, and reactive to light. Conjunctivae/corneas clear. Neck: Supple, with full range of motion. No jugular venous distention. Trachea midline. Respiratory:  improved respiratory effort. Clear to auscultation, bilaterally without Rales/Wheezes/Rhonchi.  Dullness on left lung field  Cardiovascular: Regular rate and rhythm with normal S1/S2 without murmurs, rubs or gallops. Abdomen: Soft, non-tender, non-distended with normal bowel sounds. Musculoskeletal: No clubbing, cyanosis or edema bilaterally. Full range of motion without deformity. Skin: Skin color, texture, turgor normal.  No rashes or lesions. Neurologic:  Neurovascularly intact without any focal sensory/motor deficits. Cranial nerves: II-XII intact, grossly non-focal.  Psychiatric: Alert and oriented, thought content appropriate, normal insight  Capillary Refill: Brisk, 3 seconds, normal   Peripheral Pulses: +2 palpable, equal bilaterally       Labs:   Recent Labs     12/07/22 1820 12/09/22  0526   WBC 6.2 8.7   HGB 8.6* 8.3*   HCT 26.1* 25.2*    278     Recent Labs     12/07/22 1820 12/08/22  0901 12/09/22  0526    133* 130*   K 4.3 5.0 4.9   CL 97* 97* 93*   CO2 24 26 22   BUN 57* 51* 70*   CREATININE 5.7* 5.3* 7.1*   CALCIUM 8.2* 8.7 8.2*   PHOS 3.6  --  5.8*     No results for input(s): AST, ALT, BILIDIR, BILITOT, ALKPHOS in the last 72 hours. No results for input(s): INR in the last 72 hours. No results for input(s): [place] in the last 72 hours. Urinalysis:      Lab Results   Component Value Date/Time    NITRU Negative 12/04/2022 01:19 PM    WBCUA 6-9 12/04/2022 01:19 PM    BACTERIA 3+ 05/29/2022 12:51 PM    RBCUA 0-2 12/04/2022 01:19 PM    BLOODU TRACE-INTACT 12/04/2022 01:19 PM    SPECGRAV 1.020 12/04/2022 01:19 PM    GLUCOSEU 500 12/04/2022 01:19 PM       Radiology:  XR CHEST (2 VW)   Final Result   Relatively stable left pleural effusion with increasing pattern of vascular   congestion centrally. XR CHEST (2 VW)   Final Result   Stable left pleural effusion with associated airspace change in the left   lower lobe. Improved aeration in the right lung. XR CHEST PORTABLE   Final Result   Worsening aeration in the right lower lung zone, otherwise no significant   change compared with 12/02/2022. IP CONSULT TO HOSPITALIST  IP CONSULT TO HEART FAILURE NURSE/COORDINATOR  IP CONSULT TO DIETITIAN  IP CONSULT TO NEPHROLOGY  IP CONSULT TO PULMONOLOGY  IP CONSULT TO CASE MANAGEMENT  IP CONSULT TO CARDIOLOGY  IP CONSULT TO CARDIOTHORACIC SURGERY    Assessment/Plan:    Active Hospital Problems    Diagnosis     Acute on chronic combined systolic and diastolic heart failure (HCC) [I50.43]      Priority: High    Ischemic cardiomyopathy [I25.5]      Priority: High    DM2 (diabetes mellitus, type 2) ([facility] [E11.9]      Priority: High    Type 2 myocardial infarction ([facility] [H04. A1]      Priority: Medium    Compression atelectasis [J98.11]      Priority: Medium    Acute on chronic respiratory failure (HCC) [J96.20]      Priority: Medium    SOB (shortness of breath) [R06.02]      Priority: Medium    Former smoker [E42.636]      Priority: Medium    History of transcatheter aortic valve replacement (TAVR) [Z95.2]      Priority: Medium    Asthma-COPD overlap syndrome ([facility] [J44.9]      Priority: Medium    Grade II diastolic dysfunction [T52.92]     Renovascular hypertension [I15.0]     ESRD (end stage renal disease) on dialysis ([facility] [N18.6, Z99.2]     Pleural effusion on left [J90]     Chronic anemia [D64.9]     ZAINAB (obstructive sleep apnea) [G47.33]     Obesity (BMI 30-39. 9) [E66.9]        Acute on chronic respiratory failure with Hypoxia: Recurrent presentation, just here 2 days prior. Frequent admissions for fluid overload and missed dialysis. However, reports compliance with HD this week. Recent treatment for Pneumonia. Currently, he does have mild leukocytosis but lacking other features of pneumonia. - checked Procalcitonin and elevated compared to prior(chronically elevated)  - continued treatment for presumed volume overload/CHF with IV Lasix and HD. Nephrology consulted. May need input from Cardiology and Pulmonology (not yet ordered). He does report using NIPPV at home (unclear if CPAP or BIPAP).  Wonder if this may need to be adjusted. -repeat cxr 12/6 noted left effusion/recurrent, pulm consulted(did not rec tap, rather adjust HD)   -cards consulted, apprec recs,  - limited echo ordered to r/o pericard effusion  -repeat CXR 12/8 noted stable effusion  -CTsurg consulted     Acute on chronic combined systolic and diastolic heart failure: Known LVEF 20-25%. Continued IV diuresis and HD. Monitor I/O, weights, BMP. Medical management. Diabetes Mellitus, Type 2: Diet Controlled. Recheck HbA1c and 7.0  - Low SSI if needed. Morbid Obesity -  With Body mass index is 32.49 kg/m². Complicating assessment and treatment. Placing patient at risk for multiple co-morbidities as well as early death and contributing to the patient's presentation. Paroxysmal Atrial Fibrillation: Stable. Continued Eliquis 2.5 mg BID     DVT Prophylaxis: Eliquis  Diet: ADULT DIET;  Regular; 1000 ml  Code Status: Full Code  PT/OT Eval Status: home pt/ot    Dispo - CT surgery consulted(?benefit in pleurex)    Appropriate for A1 Discharge Unit: Jazmín Garrett MD

## 2022-12-10 LAB
GLUCOSE BLD-MCNC: 157 MG/DL (ref 70–99)
GLUCOSE BLD-MCNC: 173 MG/DL (ref 70–99)
GLUCOSE BLD-MCNC: 198 MG/DL (ref 70–99)
GLUCOSE BLD-MCNC: 205 MG/DL (ref 70–99)
PERFORMED ON: ABNORMAL
PRO-BNP: ABNORMAL PG/ML (ref 0–124)

## 2022-12-10 PROCEDURE — 6370000000 HC RX 637 (ALT 250 FOR IP): Performed by: INTERNAL MEDICINE

## 2022-12-10 PROCEDURE — 2580000003 HC RX 258: Performed by: INTERNAL MEDICINE

## 2022-12-10 PROCEDURE — 2500000003 HC RX 250 WO HCPCS: Performed by: INTERNAL MEDICINE

## 2022-12-10 PROCEDURE — 83880 ASSAY OF NATRIURETIC PEPTIDE: CPT

## 2022-12-10 PROCEDURE — 36415 COLL VENOUS BLD VENIPUNCTURE: CPT

## 2022-12-10 PROCEDURE — 94660 CPAP INITIATION&MGMT: CPT

## 2022-12-10 PROCEDURE — 94761 N-INVAS EAR/PLS OXIMETRY MLT: CPT

## 2022-12-10 PROCEDURE — 6370000000 HC RX 637 (ALT 250 FOR IP): Performed by: NURSE PRACTITIONER

## 2022-12-10 PROCEDURE — 2700000000 HC OXYGEN THERAPY PER DAY

## 2022-12-10 PROCEDURE — 99233 SBSQ HOSP IP/OBS HIGH 50: CPT | Performed by: NURSE PRACTITIONER

## 2022-12-10 PROCEDURE — 2060000000 HC ICU INTERMEDIATE R&B

## 2022-12-10 RX ORDER — ASPIRIN 81 MG/1
81 TABLET, CHEWABLE ORAL DAILY
Status: DISCONTINUED | OUTPATIENT
Start: 2022-12-10 | End: 2022-12-14 | Stop reason: HOSPADM

## 2022-12-10 RX ADMIN — METOPROLOL SUCCINATE 100 MG: 50 TABLET, EXTENDED RELEASE ORAL at 09:18

## 2022-12-10 RX ADMIN — METOPROLOL SUCCINATE 100 MG: 50 TABLET, EXTENDED RELEASE ORAL at 21:38

## 2022-12-10 RX ADMIN — CALCIUM ACETATE 667 MG: 667 CAPSULE ORAL at 17:07

## 2022-12-10 RX ADMIN — CALCIUM ACETATE 667 MG: 667 CAPSULE ORAL at 13:13

## 2022-12-10 RX ADMIN — SODIUM CHLORIDE, PRESERVATIVE FREE 10 ML: 5 INJECTION INTRAVENOUS at 09:21

## 2022-12-10 RX ADMIN — CALCIUM ACETATE 667 MG: 667 CAPSULE ORAL at 09:18

## 2022-12-10 RX ADMIN — ASPIRIN 81 MG: 81 TABLET, CHEWABLE ORAL at 17:34

## 2022-12-10 RX ADMIN — ISOSORBIDE DINITRATE 20 MG: 20 TABLET ORAL at 09:18

## 2022-12-10 RX ADMIN — ISOSORBIDE DINITRATE 20 MG: 20 TABLET ORAL at 21:38

## 2022-12-10 RX ADMIN — PANTOPRAZOLE SODIUM 40 MG: 40 TABLET, DELAYED RELEASE ORAL at 09:21

## 2022-12-10 RX ADMIN — DULOXETINE HYDROCHLORIDE 30 MG: 30 CAPSULE, DELAYED RELEASE ORAL at 09:18

## 2022-12-10 RX ADMIN — QUETIAPINE FUMARATE 50 MG: 25 TABLET ORAL at 21:38

## 2022-12-10 RX ADMIN — PRAVASTATIN SODIUM 40 MG: 40 TABLET ORAL at 09:18

## 2022-12-10 RX ADMIN — SODIUM CHLORIDE, PRESERVATIVE FREE 10 ML: 5 INJECTION INTRAVENOUS at 21:38

## 2022-12-10 RX ADMIN — OXYCODONE AND ACETAMINOPHEN 1 TABLET: 7.5; 325 TABLET ORAL at 17:39

## 2022-12-10 RX ADMIN — ISOSORBIDE DINITRATE 20 MG: 20 TABLET ORAL at 14:27

## 2022-12-10 ASSESSMENT — PAIN SCALES - GENERAL: PAINLEVEL_OUTOF10: 0

## 2022-12-10 ASSESSMENT — ENCOUNTER SYMPTOMS
GASTROINTESTINAL NEGATIVE: 1
RESPIRATORY NEGATIVE: 1

## 2022-12-10 ASSESSMENT — PAIN DESCRIPTION - LOCATION: LOCATION: BACK

## 2022-12-10 ASSESSMENT — PAIN DESCRIPTION - DESCRIPTORS: DESCRIPTORS: ACHING

## 2022-12-10 NOTE — PROGRESS NOTES
Hospitalist Progress Note      PCP: Pcp No    Date of Admission: 12/4/2022    Chief Complaint: [address] Course: reviewed     Subjective: resting in bed, no sob, on bipap       Medications:  Reviewed    Infusion Medications    sodium chloride      dextrose       Scheduled Medications    epoetin siddharth-epbx  10,000 Units IntraVENous Once per day on Mon Wed Fri    apixaban  2.5 mg Oral BID    calcium acetate  1 capsule Oral TID WC    clopidogrel  75 mg Oral Daily    DULoxetine  30 mg Oral Daily    isosorbide dinitrate  20 mg Oral TID    metoprolol succinate  100 mg Oral BID    pantoprazole  40 mg Oral QAM AC    pravastatin  40 mg Oral Daily    QUEtiapine  50 mg Oral Nightly    sodium chloride flush  5-40 mL IntraVENous 2 times per day    insulin lispro  0-4 Units SubCUTAneous TID WC    insulin lispro  0-4 Units SubCUTAneous Nightly     PRN Meds: heparin (porcine), perflutren lipid microspheres, calcium carbonate, gabapentin, sodium chloride flush, sodium chloride, ondansetron **OR** ondansetron, polyethylene glycol, acetaminophen **OR** acetaminophen, glucose, dextrose bolus **OR** dextrose bolus, glucagon (rDNA), dextrose, oxyCODONE-acetaminophen, labetalol, labetalol, ipratropium-albuterol    No intake or output data in the 24 hours ending 12/10/22 0715    Physical Exam Performed:    BP (!) 91/47   Pulse 59   Temp 98.2 °F (36.8 °C) (Axillary)   Resp 17   Ht 5' 9\" (1.753 m)   Wt 216 lb 14.9 oz (98.4 kg)   SpO2 99%   BMI 32.04 kg/m²     General appearance:  no distress, appears stated age and cooperative. HEENT: Pupils equal, round, and reactive to light. Conjunctivae/corneas clear. Neck: Supple, with full range of motion. No jugular venous distention. Trachea midline. Respiratory:  improved respiratory effort. Clear to auscultation, bilaterally without Rales/Wheezes/Rhonchi.  Dullness on left lung field  Cardiovascular: Regular rate and rhythm with normal S1/S2 without murmurs, rubs or gallops. Abdomen: Soft, non-tender, non-distended with normal bowel sounds. Musculoskeletal: No clubbing, cyanosis or edema bilaterally. Full range of motion without deformity. Skin: Skin color, texture, turgor normal.  No rashes or lesions. Neurologic:  Neurovascularly intact without any focal sensory/motor deficits. Cranial nerves: II-XII intact, grossly non-focal.  Psychiatric: Alert and oriented, thought content appropriate, normal insight  Capillary Refill: Brisk, 3 seconds, normal   Peripheral Pulses: +2 palpable, equal bilaterally       Labs:   Recent Labs     12/07/22 1820 12/09/22  0526   WBC 6.2 8.7   HGB 8.6* 8.3*   HCT 26.1* 25.2*    278     Recent Labs     12/07/22 1820 12/08/22  0901 12/09/22 0526    133* 130*   K 4.3 5.0 4.9   CL 97* 97* 93*   CO2 24 26 22   BUN 57* 51* 70*   CREATININE 5.7* 5.3* 7.1*   CALCIUM 8.2* 8.7 8.2*   PHOS 3.6  --  5.8*     No results for input(s): AST, ALT, BILIDIR, BILITOT, ALKPHOS in the last 72 hours. No results for input(s): INR in the last 72 hours. No results for input(s): [place] in the last 72 hours. Urinalysis:      Lab Results   Component Value Date/Time    NITRU Negative 12/04/2022 01:19 PM    WBCUA 6-9 12/04/2022 01:19 PM    BACTERIA 3+ 05/29/2022 12:51 PM    RBCUA 0-2 12/04/2022 01:19 PM    BLOODU TRACE-INTACT 12/04/2022 01:19 PM    SPECGRAV 1.020 12/04/2022 01:19 PM    GLUCOSEU 500 12/04/2022 01:19 PM       Radiology:  XR CHEST (2 VW)   Final Result   Relatively stable left pleural effusion with increasing pattern of vascular   congestion centrally. XR CHEST (2 VW)   Final Result   Stable left pleural effusion with associated airspace change in the left   lower lobe. Improved aeration in the right lung. XR CHEST PORTABLE   Final Result   Worsening aeration in the right lower lung zone, otherwise no significant   change compared with 12/02/2022.          US THORACENTESIS Which side should the procedure be performed? Left    (Results Pending)       IP CONSULT TO HOSPITALIST  IP CONSULT TO HEART FAILURE NURSE/COORDINATOR  IP CONSULT TO DIETITIAN  IP CONSULT TO NEPHROLOGY  IP CONSULT TO PULMONOLOGY  IP CONSULT TO CASE MANAGEMENT  IP CONSULT TO CARDIOLOGY  IP CONSULT TO CARDIOTHORACIC SURGERY    Assessment/Plan:    Active Hospital Problems    Diagnosis     Acute on chronic combined systolic and diastolic heart failure (HCC) [I50.43]      Priority: High    Ischemic cardiomyopathy [I25.5]      Priority: High    DM2 (diabetes mellitus, type 2) ([facility] [E11.9]      Priority: High    Type 2 myocardial infarction ([facility] [G02. A1]      Priority: Medium    Compression atelectasis [J98.11]      Priority: Medium    Acute on chronic respiratory failure (HCC) [J96.20]      Priority: Medium    SOB (shortness of breath) [R06.02]      Priority: Medium    Former smoker [B99.507]      Priority: Medium    History of transcatheter aortic valve replacement (TAVR) [Z95.2]      Priority: Medium    Asthma-COPD overlap syndrome ([facility] [J44.9]      Priority: Medium    Grade II diastolic dysfunction [J73.23]     Renovascular hypertension [I15.0]     ESRD (end stage renal disease) on dialysis ([facility] [N18.6, Z99.2]     Pleural effusion on left [J90]     Chronic anemia [D64.9]     ZAINAB (obstructive sleep apnea) [G47.33]     Obesity (BMI 30-39. 9) [E66.9]        Acute on chronic respiratory failure with Hypoxia: Recurrent presentation, just here 2 days prior. Frequent admissions for fluid overload and missed dialysis. However, reports compliance with HD this week. Recent treatment for Pneumonia. Currently, he does have mild leukocytosis but lacking other features of pneumonia. - checked Procalcitonin and elevated compared to prior(chronically elevated)  - continued treatment for presumed volume overload/CHF with IV Lasix and HD. Nephrology consulted. May need input from Cardiology and Pulmonology (not yet ordered).  He does report using NIPPV at home (unclear if CPAP or BIPAP). Wonder if this may need to be adjusted. -repeat cxr 12/6 noted left effusion/recurrent, pulm consulted(did not rec tap, rather adjust HD)   -cards consulted, apprec recs,  - limited echo ordered to r/o pericard effusion  -repeat CXR 12/8 noted stable effusion  -CTsurg consulted, rec thoracentesis at this time, f/u as outpt for pleur-x decision     Acute on chronic combined systolic and diastolic heart failure: Known LVEF 20-25%. Continued IV diuresis and HD. Monitor I/O, weights, BMP. Medical management. Diabetes Mellitus, Type 2: Diet Controlled. Recheck HbA1c and 7.0  - Low SSI if needed. Morbid Obesity -  With Body mass index is 32.49 kg/m². Complicating assessment and treatment. Placing patient at risk for multiple co-morbidities as well as early death and contributing to the patient's presentation. Paroxysmal Atrial Fibrillation: Stable. Continued Eliquis 2.5 mg BID     DVT Prophylaxis: Eliquis  Diet: ADULT DIET;  Regular; 1000 ml  ADULT ORAL NUTRITION SUPPLEMENT; Lunch, Dinner; Fortified Pudding Oral Supplement  Code Status: Full Code    PT/OT Eval Status: home pt/ot     Dispo - pulm recs on thoracentesis, stop plavix/eliquis in preparation for thoracentesis    Appropriate for A1 Discharge Unit: Jazmín Covington MD

## 2022-12-10 NOTE — PROGRESS NOTES
[address]  Cardiology  Progress Note    Admission date:  2022    Reason for follow up visit: CHF    HPI/CC: Julio Squires is a 45 y.o. male who presented 2022 for SOB. Limited echo showed trivial pericardial effusion, EF 25-30% (known history). He has been treated for pleural effusions and ESRD. Rhythm has been sinus. Subjective: Denies chest pain, shortness of breath, palpitations and dizziness. Vitals:  Blood pressure 124/66, pulse 64, temperature 98.1 °F (36.7 °C), temperature source Oral, resp. rate 16, height 5' 9\" (1.753 m), weight 216 lb 14.9 oz (98.4 kg), SpO2 100 %.   Temp  Av.1 °F (36.7 °C)  Min: 97.8 °F (36.6 °C)  Max: 98.3 °F (36.8 °C)  Pulse  Av.2  Min: 58  Max: 70  BP  Min: 91/47  Max: 157/71  SpO2  Av.5 %  Min: 99 %  Max: 100 %  FiO2   Av %  Min: 30 %  Max: 30 %    24 hour I/O    Intake/Output Summary (Last 24 hours) at 12/10/2022 1448  Last data filed at 12/10/2022 1304  Gross per 24 hour   Intake 720 ml   Output --   Net 720 ml     Current Facility-Administered Medications   Medication Dose Route Frequency Provider Last Rate Last Admin    epoetin siddharth-epbx (RETACRIT) injection 10,000 Units  10,000 Units IntraVENous Once per day on  Jose Antonio Avila MD   10,000 Units at 22 1447    heparin (porcine) injection 3,600 Units  3,600 Units IntraCATHeter PRN Jose Antonio Avila MD   3,600 Units at 22 1704    perflutren lipid microspheres (DEFINITY) injection 1.5 mL  1.5 mL IntraVENous ONCE PRN Kirill Ravi MD        [Held by provider] apixaban Jesu Lamar) tablet 2.5 mg  2.5 mg Oral BID Pramod Aguirre MD   2.5 mg at 22 210    calcium acetate (PHOSLO) capsule 667 mg  1 capsule Oral TID WC Pramod Aguirre MD   667 mg at 12/10/22 1313    calcium carbonate (TUMS) chewable tablet 500 mg  1 tablet Oral TID PRN Pramod Aguirre MD        [facility] [place] by provider] clopidogrel (PLAVIX) tablet 75 mg  75 mg Oral Daily Pramod Aguirre MD 75 mg at 12/09/22 0813    DULoxetine (CYMBALTA) extended release capsule 30 mg  30 mg Oral Daily Sergo Burrows MD   30 mg at 12/10/22 8881    gabapentin (NEURONTIN) capsule 200 mg  200 mg Oral TID PRN Sergo Burrows MD   200 mg at 12/07/22 1008    isosorbide dinitrate (ISORDIL) tablet 20 mg  20 mg Oral TID Sergo Burrows MD   20 mg at 12/10/22 1427    metoprolol succinate (TOPROL XL) extended release tablet 100 mg  100 mg Oral BID Sergo Burrows MD   100 mg at 12/10/22 0918    pantoprazole (PROTONIX) tablet 40 mg  40 mg Oral QAM AC Sergo Burrows MD   40 mg at 12/10/22 0497    pravastatin (PRAVACHOL) tablet 40 mg  40 mg Oral Daily Sergo Burrows MD   40 mg at 12/10/22 0918    QUEtiapine (SEROQUEL) tablet 50 mg  50 mg Oral Nightly Sergo Burrows MD   50 mg at 12/09/22 2104    sodium chloride flush 0.9 % injection 5-40 mL  5-40 mL IntraVENous 2 times per day Sergo Burrows MD   10 mL at 12/10/22 1551    sodium chloride flush 0.9 % injection 5-40 mL  5-40 mL IntraVENous PRN Sergo Burrows MD        0.9 % sodium chloride infusion   IntraVENous PRN Sergo Burrows MD        ondansetron (ZOFRAN-ODT) disintegrating tablet 4 mg  4 mg Oral Q8H PRN Sergo Burrows MD        Or    ondansetron [facility]) injection 4 mg  4 mg IntraVENous Q6H PRN Sergo Burrows MD        polyethylene glycol (GLYCOLAX) packet 17 g  17 g Oral Daily PRN Sergo Burrows MD        acetaminophen (TYLENOL) tablet 650 mg  650 mg Oral Q6H PRN Sergo Burrows MD        Or    acetaminophen (TYLENOL) suppository 650 mg  650 mg Rectal Q6H PRN Sergo Burrows MD        glucose chewable tablet 16 g  4 tablet Oral PRN Sergo Burrows MD        dextrose bolus 10% 125 mL  125 mL IntraVENous BRIAN Sergo Burrows MD        Or    dextrose bolus 10% 250 mL  250 mL IntraVENous PRN Sergo Burrows MD        glucagon (rDNA) injection 1 mg  1 mg SubCUTAneous PRN Sergo Burrows MD        dextrose 10 % infusion   IntraVENous Continuous PRN Render Boxer, MD        insulin lispro (HUMALOG) injection vial 0-4 Units  0-4 Units SubCUTAneous TID WC Render Boxer, MD        insulin lispro (HUMALOG) injection vial 0-4 Units  0-4 Units SubCUTAneous Nightly Render Boxer, MD        oxyCODONE-acetaminophen (PERCOCET) 7.5-325 MG per tablet 1 tablet  1 tablet Oral Q8H PRN Render Boxer, MD   1 tablet at 12/09/22 1836    labetalol (NORMODYNE;TRANDATE) injection 10 mg  10 mg IntraVENous Q4H PRN Yordy Garland MD        labetalol (NORMODYNE;TRANDATE) injection 20 mg  20 mg IntraVENous Q4H PRN Yordy Garland MD        ipratropium-albuterol (DUONEB) nebulizer solution 1 ampule  1 ampule Inhalation Q4H PRN Render Boxer, MD   1 ampule at 12/09/22 2100     Review of Systems   Constitutional: Negative. Respiratory: Negative. Cardiovascular: Negative. Gastrointestinal: Negative. Neurological: Negative. Objective:     Telemetry monitor: SR    Physical Exam:  Constitutional:  Comfortable and alert, NAD, appears older than stated age, chronically ill  Eyes: PERRL, sclera nonicteric  Neck:  Supple, no masses, no thyroidmegaly, JVD difficult to assess  Skin:  Warm and dry; no rash or lesions  Heart:  Regular, normal apex, S1 and S2 normal, no M/G/R  Lungs:  Normal respiratory effort; clear; no wheezing/rhonchi/rales  Abdomen: soft, non tender, + bowel sounds  Extremities:  No edema or cyanosis; no clubbing  Neuro: alert and oriented, moves legs and arms equally, normal mood and affect    Data Reviewed:    Echo 12/2022:   Limited only f/u for LVEF and pericardial effusion. The left ventricular systolic function is severely reduced with an ejection   fraction of 25- 30%. Changes noted from previous echo on 9- in left ventricular function. There is a trivial to small localized near left and right ventricle   pericardial effusion noted. No tamponade physiology.     Coronary angiogram 6/2022:  High risk abnormal stress test  Ischemic or mildly  Non-STEMI  PROCEDURES PERFORMED    Right heart catheterization  Left heart catheterization  LVgram  Coronary angiogam  Coronary cath  Monitoring of moderate conscious sedation  PROCEDURE DESCRIPTION   Risks/benefits/alternatives/outcomes were discussed with patient and/or family and informed consent was obtained. Using the [facility] scale, the patient's right radial artery was found to be a level B. Patient was prepped draped in the usual sterile fashion. Local anaesthetic was applied over puncture site. Using ultrasound guidance and a front wall technique, a 4/5 [place] Terumo sheath was inserted into right radial artery. Verapamil, nitroglycerin, nicardipine were administered through the sheath. Heparin was administered. Diagnostic 5 [place] pigtail, TIG, Binu catheters were used for diagnostic angiograms. At the conclusion of the procedure, a TR band was placed over the puncture site and hemostasis was obtained. There were no immediate complications. I supervised sedation from 9:53 AM to 11:08 AM with versed 5 mg/fentanyl 125 mcg during the procedure. An independent trained observer pushed meds at my direction. We monitored the patient's level of consciousness and vital signs/physiologic status throughout the procedure duration (see times listed previously). 205 cc contrast was utilized. <20cc EBL. Case/findings/plans discussed with patient's wife, she understood/agreed.   FINDINGS      CHAMBER PRESSURE SATURATION   RA  10 60%   RV  47/9     PA  50/20  67%   PW  19     AORTA  101/50  95%      NANCY CARDIAC OUTPUT  5.8 L/min   SVR  756    PVR  234       LVEDP  21   GRADIENT ACROSS AORTIC VALVE  less than 5 mmHg   LV FUNCTION EF 20%   WALL MOTION  severe global hypokinesis with regional variation   MITRAL REGURGITATION  mild      LM Less than 10% hgoyczww-tip-evseja stenosis            LAD Moderately calcified, 20 to 30% proximal-mid stenosis, distal vessel tapers at the apex with 60% diffuse disease. D1 has an ostial/proximal 75 to 80% stenosis. LCX  Calcified, proximal-mid 75 to 80% stenosis. Distal vessel is tortuous with 70 to 85% diffuse stenosis with more discrete stenosis noted distally. OM 1 is a very small vessel with ostial/proximal 80% stenosis and 60 to 70% diffuse disease in the lhyjsdfa-vqd-zqsavn vessel. Isatu Pantoja RCA Dominant, calcified, tortuous, there is a proximal-mid stent with 60 to 70% in-stent restenosis. Distal vessel 20 to 30% stenosis      CONCLUSIONS:    Mild to moderately increased filling pressures  Patent RCA stent with moderate to borderline severe in-stent restenosis  Severe circumflex and D1 stenosis  Continue medical management, consider outpatient high risk PCI of above territories pending outpatient clinical follow-up. Currently medical management seems best given comorbidities and need for additional fluid removal.  If PCI is pursued, will likely need general anesthesia. Patient had difficulty lying still on Cath Lab table. Okay to resume Eliquis tomorrow, case/plan/findings discussed with patient and wife, they understand/agree, they stated it is incorrectly stated in chart the patient would refuse blood, he is okay to receive blood products if needed. Continue beta-blocker and Entresto     Stress test 6/2/2022:   Severe LV dysfunction EF 26%    Global hypokinesis with regional variation, more pronounced in inferolateral    wall and apex    Large mainly fixed defect in inferior wall, extends to portions of lateral    wall and apex with moderate darrick-infarct ischemia      Echo 6/3/2022:  Definity® used for myocardial border enhancement. Left ventricular systolic function is severely reduced with a visually   estimated ejection fraction of 20-25 %. EF estimated by Jasmine's method at 24 %. The left ventricle is mildly dilated in size with normal wall thickness. Severe global hypokinesis with regional variation.    Grade I 03/17/2022 08:33 PM     All labs and imaging reviewed today    Assessment:  Elevated troponin: chronic elevation due to poor clearance with ESRD  CAD: LCx and Diag lesions along with moderate-severe RCA restenosis, medical management due to comorbidities 6/2022; s/p PATRICIA RCA 1/14/2022; s/p PATRICIA LAD 2015  HFrEF  Bicuspid AV/severe AS: s/p TAVR 10/2019  Ischemic cardiomyopathy: EF 20-25% on echo 6/3/2022; EF 40-45% on echo 1/12/2022  Paroxysmal atrial fibrillation: stable              - [id number] score >2 on eliquis  PVD: s/p PTA/stent R external iliac 5/2019  Bradycardia/transient sinus pauses: noted 12/12/2020, no recurrence  Pleural effusion  Acute on chronic respiratory failure  HTN: stable  HLD: improved, LDL 65, statin and recheck  ESRD: on HD MWF; follows with Dr. Katelin Nowak  DM: hgb a1c 7  Anemia  ZAINAB  History of CVA    Plan:   1. Fluid management with HD  2. Would start aspirin if plavix and eliquis held (for thoracentesis), had PCI 1/2022; ok to stop aspirin once plavix and eliquis restarted  3. Continue statin, isordil, toprol; need to investigate why entresto stopped  4. Plans for thoracentesis  5.  Optimize GDMT and reassess EF in follow up, if EF remains <35%, consider ICD    [address]  [phone]

## 2022-12-10 NOTE — PLAN OF CARE
Problem: Discharge Planning  Goal: Discharge to home or other facility with appropriate resources  Outcome: Progressing     Problem: Safety - Adult  Goal: Free from fall injury  Outcome: Progressing     Problem: ABCDS Injury Assessment  Goal: Absence of physical injury  Outcome: Progressing     Problem: Pain  Goal: Verbalizes/displays adequate comfort level or baseline comfort level  Outcome: Progressing     Problem: Chronic Conditions and Co-morbidities  Goal: Patient's chronic conditions and co-morbidity symptoms are monitored and maintained or improved  Outcome: Progressing     Problem: Nutrition Deficit:  Goal: Optimize nutritional status  Outcome: Progressing

## 2022-12-10 NOTE — PROGRESS NOTES
CC:ESRD  HPI     44 y/o male with history of ESRD admitted with acute onset of shortness of breath. He was recently admitted with pneumonia. He says since discharge he has been having fevers and night sweats. He has been compliant with dialysis     SUBJECTIVE  The patient was seen and examined; he feels well today with no CP, SOB, nausea or vomiting. ROS: No fever or chills. Social: No family at bedside.     Scheduled Meds:   epoetin siddharth-epbx  10,000 Units IntraVENous Once per day on Mon Wed Fri    [Held by provider] apixaban  2.5 mg Oral BID    calcium acetate  1 capsule Oral TID     [Held by provider] clopidogrel  75 mg Oral Daily    DULoxetine  30 mg Oral Daily    isosorbide dinitrate  20 mg Oral TID    metoprolol succinate  100 mg Oral BID    pantoprazole  40 mg Oral QAM AC    pravastatin  40 mg Oral Daily    QUEtiapine  50 mg Oral Nightly    sodium chloride flush  5-40 mL IntraVENous 2 times per day    insulin lispro  0-4 Units SubCUTAneous TID WC    insulin lispro  0-4 Units SubCUTAneous Nightly     Continuous Infusions:   sodium chloride      dextrose       PRN Meds:heparin (porcine), perflutren lipid microspheres, calcium carbonate, gabapentin, sodium chloride flush, sodium chloride, ondansetron **OR** ondansetron, polyethylene glycol, acetaminophen **OR** acetaminophen, glucose, dextrose bolus **OR** dextrose bolus, glucagon (rDNA), dextrose, oxyCODONE-acetaminophen, labetalol, labetalol, ipratropium-albuterol      Objective:      Physical Exam     Wt Readings from Last 3 Encounters:   12/09/22 216 lb 14.9 oz (98.4 kg)   12/02/22 220 lb (99.8 kg)   11/28/22 253 lb 8.5 oz (115 kg)     Temp Readings from Last 3 Encounters:   12/10/22 98.3 °F (36.8 °C) (Axillary)   12/03/22 97 °F (36.1 °C)   11/28/22 98.1 °F (36.7 °C)     BP Readings from Last 3 Encounters:   12/10/22 123/73   12/03/22 (!) 156/75   11/28/22 132/78     Pulse Readings from Last 3 Encounters:   12/10/22 60   12/03/22 91   11/28/22 61     General:  NAD, A+Ox3, ill-appearing, normal body habitus  HEENT:  PERRL, EOMI  Neck:  Supple, normal range of movement  Chest: On bilevel, no wheezing   CV:  Tachycardic, no rub   Abdomen:  NTND, soft, +BS, no hepatosplenomegaly  Extremities:  No peripheral edema  Neurological:  Moving all four extremities, CN II-XII grossly intact  Lymphatics:  No palpable lymph nodes  Skin:  No rash, no jaundice  Psychiatric:  Alert, flat affect  Access:  Left [facility] [place], operated by [facility]     Lab Review   Lab Results   Component Value Date    WBC 8.7 12/09/2022    HGB 8.3 (L) 12/09/2022    HCT 25.2 (L) 12/09/2022    MCV 91.2 12/09/2022     12/09/2022     Lab Results   Component Value Date/Time     12/09/2022 05:26 AM    K 4.9 12/09/2022 05:26 AM    K 3.8 12/02/2022 11:54 PM    CL 93 12/09/2022 05:26 AM    CO2 22 12/09/2022 05:26 AM    BUN 70 12/09/2022 05:26 AM    CREATININE 7.1 12/09/2022 05:26 AM    GLUCOSE 135 12/09/2022 05:26 AM    CALCIUM 8.2 12/09/2022 05:26 AM            Patient Active Problem List    Diagnosis Date Noted    Hypotension due to drugs 11/25/2017    Acute on chronic combined systolic and diastolic heart failure (HCC)     Ischemic cardiomyopathy     Dyslipidemia     DM2 (diabetes mellitus, type 2) ([address] 03/04/2014    Bicuspid aortic valve 06/03/2013    CHF (congestive heart failure) ([address] 05/14/2013    CAD (coronary artery disease) 05/14/2013    PVD (peripheral vascular disease) ([address] 05/14/2013    Type 2 myocardial infarction ([address] 12/08/2022    Compression atelectasis 12/06/2022    Acute on chronic respiratory failure ([address] 12/04/2022    Acute pneumonia 11/25/2022    Hypervolemia 10/19/2022    Pericardial effusion 09/28/2022    HFrEF (heart failure with reduced ejection fraction) ([address] 09/24/2022    Acute respiratory failure with hypoxia and hypercapnia ([facility] 09/23/2022    Hypertensive emergency 07/17/2022    SOB (shortness of breath) 07/17/2022    Altered mental status     Hypoglycemia 05/29/2022    Morbid obesity with BMI of 40.0-44.9, adult ([address] 04/26/2022    Former smoker 04/19/2022    Cardiomyopathy ([address] 04/19/2022    History of transcatheter aortic valve replacement (TAVR) 10/19/2019    Uncontrolled hypertension 11/14/2013    Asthma-COPD overlap syndrome ([address] 05/14/2013    Respiratory failure ([address] 04/18/2022    Pulmonary edema with diastolic CHF, NYHA class 3 ([address] 03/17/2022    Liberty-rectal abscess     Somnolence     Atrial flutter ([address]     SIRS (systemic inflammatory response syndrome) ([address] 02/21/2022    NSTEMI (non-ST elevated myocardial infarction) ([address] 01/12/2022    Acute respiratory failure with hypercapnia ([address] 11/30/2021    Acute pulmonary edema ([address] 11/30/2021    Grade II diastolic dysfunction 21/71/7551    Shock circulatory ([address] 11/30/2021    Smoker 11/30/2021    Normocytic normochromic anemia 11/30/2021    Respiratory failure with hypoxia ([address] 11/29/2021    Speech problem     Urinary tract infection with hematuria     Acute CVA (cerebrovascular accident) ([address] 09/07/2021    Acute hypoxemic respiratory failure ([address] 08/12/2021    Type 2 diabetes mellitus with hyperglycemia ([address] 06/27/2021    Acute encephalopathy 06/27/2021    Cellulitis and abscess of hand 04/24/2021    Iliac artery occlusion, right (HCC)     Cellulitis of right foot 01/29/2021    Peripheral vascular occlusive disease ([address] 01/02/2021    Ataxia     Weakness of both lower extremities 12/30/2020    Sinus bradycardia 12/30/2020    Sinus pause     GBS (Guillain-Showell syndrome) (HCC)     Bilateral leg weakness 12/04/2020    Bradycardia 10/19/2019    Syncope and collapse 10/19/2019    PAF (paroxysmal atrial fibrillation) ([address]     Hypercholesteremia 07/30/2019    Chronic bronchitis ([address] 05/21/2019    Nasal congestion 05/21/2019    Chronic, continuous use of opioids 04/15/2019    Steal syndrome of dialysis vascular access (HCC)     SOB (shortness of breath) on exertion 12/24/2018    Anemia of chronic disease 11/12/2018    Pulmonary edema 11/09/2018    Fluid overload 11/09/2018    Renovascular hypertension     Mixed hyperlipidemia     Cigarette nicotine dependence in remission     Neuromuscular disorder (HCC)     Acute diastolic CHF (congestive heart failure) ([address] 03/18/2018    Hemodialysis-associated hypotension 11/22/2017    ESRD (end stage renal disease) on dialysis ([address] 11/22/2017    Mucus plugging of bronchi     Nonrheumatic aortic valve stenosis     Pleural effusion on left     Chronic anemia     History of CVA (cerebrovascular accident)     Type 2 diabetes mellitus without complication, without long-term current use of insulin ([facility])     ZAINAB (obstructive sleep apnea)     Tobacco abuse 05/21/2017    CVA (cerebral vascular accident) ([address] 05/21/2017    Angina, class IV ([address]     Dyspnea     Gastroparesis due to DM ([address]     Biliary colic 15/15/5545    Symptomatic cholelithiasis 01/04/2017    Degeneration of lumbar or lumbosacral intervertebral disc 03/18/2016    Lumbar radiculopathy 03/18/2016    Lumbosacral spondylosis without myelopathy 03/18/2016    ZAINAB on CPAP 02/11/2016    Obesity (BMI 30-39. 9)     PNA (pneumonia) 03/28/2015    Depression with anxiety 06/05/2014    Passive smoke exposure 05/30/2014    Diabetic neuropathy ([address] 11/14/2013    Claudication in peripheral vascular disease ([address] 06/26/2013    Bilateral hilar adenopathy syndrome 06/03/2013    Sepsis without acute organ dysfunction ([address] 12/25/2012       ASSESSMENT AND PLAN     #ESRD:On dialysis TTS at [facility]  Prior target weight of 102 kg but after recent hospitalization he was down to 99 kg ( outpatient )  -Has working left [facility]. Prior fistula with dialysis associated steal syndrome and he had severe needle phobia so it was not used and ligated  -for HD today, orders reviewed    #Shortness of breath:multifactorial  Recent pneumonia.     Recurrent L pleural effusion  -pulmonary following; agree that he may need pleurodesis, or pleurX  -po fluid restriction , decrease DW at HD as tolerated     #Anemia of chronic disease:  Getting DAVON at dialysis    #Hypertension:  Range improving

## 2022-12-10 NOTE — PROGRESS NOTES
12/09/22 2329   NIV Type   Equipment Type V60   Mode Bilevel   Mask Type Full face mask   Mask Size Medium   Settings/Measurements   PIP Observed 17 cm H20   IPAP 16 cmH20   CPAP/EPAP 8 cmH2O   Vt (Measured) 450 mL   Resp 19   FiO2  30 %   Minute Volume (L/min) 8 Liters   Mask Leak (lpm) 26 lpm   Comfort Level Good   Using Accessory Muscles No   SpO2 100   Patient's Home Machine No

## 2022-12-10 NOTE — PROGRESS NOTES
12/10/22 0456   NIV Type   $NIV $Daily Charge   Equipment Type V60   Mode Bilevel   Mask Type Full face mask   Mask Size Medium   Settings/Measurements   PIP Observed 17 cm H20   IPAP 16 cmH20   CPAP/EPAP 8 cmH2O   Vt (Measured) 720 mL   Resp 17   FiO2  30 %   Minute Volume (L/min) 12 Liters   Mask Leak (lpm) 72 lpm   Comfort Level Good   Using Accessory Muscles No   SpO2 100   Patient's Home Machine No

## 2022-12-11 VITALS
OXYGEN SATURATION: 99 % | HEART RATE: 65 BPM | WEIGHT: 229.28 LBS | BODY MASS INDEX: 33.96 KG/M2 | HEIGHT: 69 IN | SYSTOLIC BLOOD PRESSURE: 193 MMHG | RESPIRATION RATE: 18 BRPM | DIASTOLIC BLOOD PRESSURE: 86 MMHG | TEMPERATURE: 98.1 F

## 2022-12-11 LAB
GLUCOSE BLD-MCNC: 175 MG/DL (ref 70–99)
GLUCOSE BLD-MCNC: 179 MG/DL (ref 70–99)
GLUCOSE BLD-MCNC: 188 MG/DL (ref 70–99)
GLUCOSE BLD-MCNC: 191 MG/DL (ref 70–99)
PERFORMED ON: ABNORMAL

## 2022-12-11 PROCEDURE — 94660 CPAP INITIATION&MGMT: CPT

## 2022-12-11 PROCEDURE — 6370000000 HC RX 637 (ALT 250 FOR IP): Performed by: NURSE PRACTITIONER

## 2022-12-11 PROCEDURE — 2580000003 HC RX 258: Performed by: INTERNAL MEDICINE

## 2022-12-11 PROCEDURE — 99232 SBSQ HOSP IP/OBS MODERATE 35: CPT | Performed by: NURSE PRACTITIONER

## 2022-12-11 PROCEDURE — 2060000000 HC ICU INTERMEDIATE R&B

## 2022-12-11 PROCEDURE — 6370000000 HC RX 637 (ALT 250 FOR IP): Performed by: INTERNAL MEDICINE

## 2022-12-11 PROCEDURE — 2500000003 HC RX 250 WO HCPCS: Performed by: INTERNAL MEDICINE

## 2022-12-11 PROCEDURE — 94761 N-INVAS EAR/PLS OXIMETRY MLT: CPT

## 2022-12-11 PROCEDURE — 2700000000 HC OXYGEN THERAPY PER DAY

## 2022-12-11 RX ADMIN — QUETIAPINE FUMARATE 50 MG: 25 TABLET ORAL at 20:09

## 2022-12-11 RX ADMIN — OXYCODONE AND ACETAMINOPHEN 1 TABLET: 7.5; 325 TABLET ORAL at 20:09

## 2022-12-11 RX ADMIN — CALCIUM ACETATE 667 MG: 667 CAPSULE ORAL at 08:42

## 2022-12-11 RX ADMIN — SODIUM CHLORIDE, PRESERVATIVE FREE 10 ML: 5 INJECTION INTRAVENOUS at 08:43

## 2022-12-11 RX ADMIN — CALCIUM ACETATE 667 MG: 667 CAPSULE ORAL at 17:25

## 2022-12-11 RX ADMIN — DULOXETINE HYDROCHLORIDE 30 MG: 30 CAPSULE, DELAYED RELEASE ORAL at 08:42

## 2022-12-11 RX ADMIN — PANTOPRAZOLE SODIUM 40 MG: 40 TABLET, DELAYED RELEASE ORAL at 06:19

## 2022-12-11 RX ADMIN — ASPIRIN 81 MG: 81 TABLET, CHEWABLE ORAL at 08:42

## 2022-12-11 RX ADMIN — ISOSORBIDE DINITRATE 20 MG: 20 TABLET ORAL at 13:54

## 2022-12-11 RX ADMIN — METOPROLOL SUCCINATE 100 MG: 50 TABLET, EXTENDED RELEASE ORAL at 20:09

## 2022-12-11 RX ADMIN — ISOSORBIDE DINITRATE 20 MG: 20 TABLET ORAL at 08:42

## 2022-12-11 RX ADMIN — CALCIUM ACETATE 667 MG: 667 CAPSULE ORAL at 12:28

## 2022-12-11 RX ADMIN — INSULIN LISPRO 1 UNITS: 100 INJECTION, SOLUTION INTRAVENOUS; SUBCUTANEOUS at 17:25

## 2022-12-11 RX ADMIN — METOPROLOL SUCCINATE 100 MG: 50 TABLET, EXTENDED RELEASE ORAL at 08:42

## 2022-12-11 RX ADMIN — PRAVASTATIN SODIUM 40 MG: 40 TABLET ORAL at 08:42

## 2022-12-11 RX ADMIN — ISOSORBIDE DINITRATE 20 MG: 20 TABLET ORAL at 20:09

## 2022-12-11 RX ADMIN — SODIUM CHLORIDE, PRESERVATIVE FREE 10 ML: 5 INJECTION INTRAVENOUS at 20:10

## 2022-12-11 ASSESSMENT — ENCOUNTER SYMPTOMS
RESPIRATORY NEGATIVE: 1
GASTROINTESTINAL NEGATIVE: 1

## 2022-12-11 ASSESSMENT — PAIN SCALES - GENERAL
PAINLEVEL_OUTOF10: 8
PAINLEVEL_OUTOF10: 3

## 2022-12-11 NOTE — PROGRESS NOTES
CC:ESRD  HPI     44 y/o male with history of ESRD admitted with acute onset of shortness of breath. He was recently admitted with pneumonia. He says since discharge he has been having fevers and night sweats. He has been compliant with dialysis     SUBJECTIVE  The patient was seen and examined; he feels well today with no CP, SOB, nausea or vomiting. ROS: No fever or chills. Social: No family at bedside.     Scheduled Meds:   aspirin  81 mg Oral Daily    epoetin siddharth-epbx  10,000 Units IntraVENous Once per day on Mon Wed Fri    [Held by provider] apixaban  2.5 mg Oral BID    calcium acetate  1 capsule Oral TID     [Held by provider] clopidogrel  75 mg Oral Daily    DULoxetine  30 mg Oral Daily    isosorbide dinitrate  20 mg Oral TID    metoprolol succinate  100 mg Oral BID    pantoprazole  40 mg Oral QAM AC    pravastatin  40 mg Oral Daily    QUEtiapine  50 mg Oral Nightly    sodium chloride flush  5-40 mL IntraVENous 2 times per day    insulin lispro  0-4 Units SubCUTAneous TID     insulin lispro  0-4 Units SubCUTAneous Nightly     Continuous Infusions:   sodium chloride      dextrose       PRN Meds:heparin (porcine), perflutren lipid microspheres, calcium carbonate, gabapentin, sodium chloride flush, sodium chloride, ondansetron **OR** ondansetron, polyethylene glycol, acetaminophen **OR** acetaminophen, glucose, dextrose bolus **OR** dextrose bolus, glucagon (rDNA), dextrose, oxyCODONE-acetaminophen, labetalol, labetalol, ipratropium-albuterol      Objective:      Physical Exam     Wt Readings from Last 3 Encounters:   12/11/22 229 lb 4.5 oz (104 kg)   12/02/22 220 lb (99.8 kg)   11/28/22 253 lb 8.5 oz (115 kg)     Temp Readings from Last 3 Encounters:   12/11/22 97.6 °F (36.4 °C) (Oral)   12/03/22 97 °F (36.1 °C)   11/28/22 98.1 °F (36.7 °C)     BP Readings from Last 3 Encounters:   12/11/22 136/78   12/03/22 (!) 156/75   11/28/22 132/78     Pulse Readings from Last 3 Encounters:   12/11/22 57 12/03/22 91   11/28/22 63     General:  NAD, A+Ox3, ill-appearing, normal body habitus  HEENT:  PERRL, EOMI  Neck:  Supple, normal range of movement  Chest: On bilevel, no wheezing   CV:  Tachycardic, no rub   Abdomen:  NTND, soft, +BS, no hepatosplenomegaly  Extremities:  No peripheral edema  Neurological:  Moving all four extremities, CN II-XII grossly intact  Lymphatics:  No palpable lymph nodes  Skin:  No rash, no jaundice  Psychiatric:  Alert, flat affect  Access:  Left [facility]     Lab Review   Lab Results   Component Value Date    WBC 8.7 12/09/2022    HGB 8.3 (L) 12/09/2022    HCT 25.2 (L) 12/09/2022    MCV 91.2 12/09/2022     12/09/2022     Lab Results   Component Value Date/Time     12/09/2022 05:26 AM    K 4.9 12/09/2022 05:26 AM    K 3.8 12/02/2022 11:54 PM    CL 93 12/09/2022 05:26 AM    CO2 22 12/09/2022 05:26 AM    BUN 70 12/09/2022 05:26 AM    CREATININE 7.1 12/09/2022 05:26 AM    GLUCOSE 135 12/09/2022 05:26 AM    CALCIUM 8.2 12/09/2022 05:26 AM            Patient Active Problem List    Diagnosis Date Noted    Hypotension due to drugs 11/25/2017    Acute on chronic combined systolic and diastolic heart failure (HCC)     Ischemic cardiomyopathy     Dyslipidemia     DM2 (diabetes mellitus, type 2) ([facility] 03/04/2014    Bicuspid aortic valve 06/03/2013    CHF (congestive heart failure) ([facility] 05/14/2013    CAD (coronary artery disease) 05/14/2013    PVD (peripheral vascular disease) ([facility] 05/14/2013    Type 2 myocardial infarction ([facility] 12/08/2022    Compression atelectasis 12/06/2022    Acute on chronic respiratory failure ([facility] 12/04/2022    Acute pneumonia 11/25/2022    Hypervolemia 10/19/2022    Pericardial effusion 09/28/2022    HFrEF (heart failure with reduced ejection fraction) ([facility] 09/24/2022    Acute respiratory failure with hypoxia and hypercapnia ([facility] 09/23/2022    Hypertensive emergency 07/17/2022    SOB (shortness of breath) 07/17/2022    Altered mental status     Hypoglycemia 05/29/2022    Morbid obesity with BMI of 40.0-44.9, adult ([address] 04/26/2022    Former smoker 04/19/2022    Cardiomyopathy ([address] 04/19/2022    History of transcatheter aortic valve replacement (TAVR) 10/19/2019    Uncontrolled hypertension 11/14/2013    Asthma-COPD overlap syndrome ([address] 05/14/2013    Respiratory failure ([address] 04/18/2022    Pulmonary edema with diastolic CHF, NYHA class 3 ([address] 03/17/2022    [place]iberty-rectal abscess     Somnolence     Atrial flutter ([address]     SIRS (systemic inflammatory response syndrome) ([address] 02/21/2022    NSTEMI (non-ST elevated myocardial infarction) ([address] 01/12/2022    Acute respiratory failure with hypercapnia ([address] 11/30/2021    Acute pulmonary edema ([address] 11/30/2021    Grade II diastolic dysfunction 88/61/1601    Shock circulatory ([address] 11/30/2021    Smoker 11/30/2021    Normocytic normochromic anemia 11/30/2021    Respiratory failure with hypoxia ([address] 11/29/2021    Speech problem     Urinary tract infection with hematuria     Acute CVA (cerebrovascular accident) ([address] 09/07/2021    Acute hypoxemic respiratory failure ([address] 08/12/2021    Type 2 diabetes mellitus with hyperglycemia ([address] 06/27/2021    Acute encephalopathy 06/27/2021    Cellulitis and abscess of hand 04/24/2021    Iliac artery occlusion, right (HCC)     Cellulitis of right foot 01/29/2021    Peripheral vascular occlusive disease ([address] 01/02/2021    Ataxia     Weakness of both lower extremities 12/30/2020    Sinus bradycardia 12/30/2020    Sinus pause     GBS (Guillain-[place] syndrome) (HCC)     Bilateral leg weakness 12/04/2020    Bradycardia 10/19/2019    Syncope and collapse 10/19/2019    PAF (paroxysmal atrial fibrillation) ([address]     Hypercholesteremia 07/30/2019    Chronic bronchitis ([facility] 05/21/2019    Nasal congestion 05/21/2019    Chronic, continuous use of opioids 04/15/2019    Steal syndrome of dialysis vascular access (HCC)     SOB (shortness of breath) on exertion 12/24/2018    Anemia of chronic disease 11/12/2018    Pulmonary edema 11/09/2018    Fluid overload 11/09/2018    Renovascular hypertension     Mixed hyperlipidemia     Cigarette nicotine dependence in remission     Neuromuscular disorder (HCC)     Acute diastolic CHF (congestive heart failure) ([address] 03/18/2018    Hemodialysis-associated hypotension 11/22/2017    ESRD (end stage renal disease) on dialysis ([address] 11/22/2017    Mucus plugging of bronchi     Nonrheumatic aortic valve stenosis     Pleural effusion on left     Chronic anemia     History of CVA (cerebrovascular accident)     Type 2 diabetes mellitus without complication, without long-term current use of insulin ([facility])     ZAINAB (obstructive sleep apnea)     Tobacco abuse 05/21/2017    CVA (cerebral vascular accident) ([address] 05/21/2017    Angina, class IV ([address]     Dyspnea     Gastroparesis due to DM ([address]     Biliary colic 16/59/3364    Symptomatic cholelithiasis 01/04/2017    Degeneration of lumbar or lumbosacral intervertebral disc 03/18/2016    Lumbar radiculopathy 03/18/2016    Lumbosacral spondylosis without myelopathy 03/18/2016    ZAINAB on CPAP 02/11/2016    Obesity (BMI 30-39. 9)     PNA (pneumonia) 03/28/2015    Depression with anxiety 06/05/2014    Passive smoke exposure 05/30/2014    Diabetic neuropathy ([address] 11/14/2013    Claudication in peripheral vascular disease ([address] 06/26/2013    Bilateral hilar adenopathy syndrome 06/03/2013    Sepsis without acute organ dysfunction ([address] 12/25/2012       ASSESSMENT AND PLAN     #ESRD:On dialysis TTS at [facility]  Prior target weight of 102 kg but after recent hospitalization he was down to 99 kg ( outpatient )  -Has working left [facility]. Prior fistula with dialysis associated steal syndrome and he had severe needle phobia so it was not used and ligated  -HD per TThS schedule    #Shortness of breath:multifactorial  Recent pneumonia.     Recurrent L pleural effusion  -pulmonary following; agree that he may need pleurodesis, or pleurX  -po fluid restriction , decrease DW at HD as tolerated     #Anemia of chronic disease:  Getting DAVON at dialysis    #Hypertension:  Range improving

## 2022-12-11 NOTE — PROGRESS NOTES
[facility]  Cardiology  Progress Note    Admission date:  2022    Reason for follow up visit: CHF    HPI/CC: Dorinda Rushing is a 45 y.o. male who presented 2022 for SOB. Limited echo showed trivial pericardial effusion, EF 25-30% (known history). He has been treated for pleural effusions and ESRD. Rhythm has been sinus. Subjective: Resting comfortably on bipap. No complaints. Vitals:  Blood pressure 139/80, pulse 53, temperature 97.8 °F (36.6 °C), temperature source Oral, resp. rate 16, height 5' 9\" (1.753 m), weight 229 lb 4.5 oz (104 kg), SpO2 100 %.   Temp  Av.1 °F (36.7 °C)  Min: 97.8 °F (36.6 °C)  Max: 98.3 °F (36.8 °C)  Pulse  Av  Min: 53  Max: 64  BP  Min: 123/73  Max: 168/89  SpO2  Av.5 %  Min: 99 %  Max: 100 %  FiO2   Av %  Min: 30 %  Max: 30 %    24 hour I/O    Intake/Output Summary (Last 24 hours) at 2022 0757  Last data filed at 2022 0541  Gross per 24 hour   Intake 1440 ml   Output 0 ml   Net 1440 ml       Current Facility-Administered Medications   Medication Dose Route Frequency Provider Last Rate Last Admin    aspirin chewable tablet 81 mg  81 mg Oral Daily JAY Jackson - CNP   81 mg at 12/10/22 1734    epoetin siddharth-epbx (RETACRIT) injection 10,000 Units  10,000 Units IntraVENous Once per day on  Justin Mortimer, MD   10,000 Units at 22 1447    heparin (porcine) injection 3,600 Units  3,600 Units IntraCATHeter PRN Justin Mortimer, MD   3,600 Units at 22 1704    perflutren lipid microspheres (DEFINITY) injection 1.5 mL  1.5 mL IntraVENous ONCE PRN Jhonny Ware MD        [Held by provider] apixaban Irma Benavides) tablet 2.5 mg  2.5 mg Oral BID Grecia Funk MD   2.5 mg at 22 210    calcium acetate (PHOSLO) capsule 667 mg  1 capsule Oral TID  Grecia Funk MD   667 mg at 12/10/22 1707    calcium carbonate (TUMS) chewable tablet 500 mg  1 tablet Oral TID PRN Grecia Funk MD        [facility] provider] clopidogrel (PLAVIX) tablet 75 mg  75 mg Oral Daily Sobeida Whiteside MD   75 mg at 12/09/22 0813    DULoxetine (CYMBALTA) extended release capsule 30 mg  30 mg Oral Daily Sobeida Whiteside MD   30 mg at 12/10/22 6071    gabapentin (NEURONTIN) capsule 200 mg  200 mg Oral TID PRN Sobeida Whiteside MD   200 mg at 12/07/22 1008    isosorbide dinitrate (ISORDIL) tablet 20 mg  20 mg Oral TID Sobeida Whiteside MD   20 mg at 12/10/22 2138    metoprolol succinate (TOPROL XL) extended release tablet 100 mg  100 mg Oral BID Sobeida Whiteside MD   100 mg at 12/10/22 2138    pantoprazole (PROTONIX) tablet 40 mg  40 mg Oral QAM AC Sobeida Whiteside MD   40 mg at 12/11/22 7245    pravastatin (PRAVACHOL) tablet 40 mg  40 mg Oral Daily Sobeida Whiteside MD   40 mg at 12/10/22 0918    QUEtiapine (SEROQUEL) tablet 50 mg  50 mg Oral Nightly Sobeida Whiteside MD   50 mg at 12/10/22 2138    sodium chloride flush 0.9 % injection 5-40 mL  5-40 mL IntraVENous 2 times per day Sobeida Whiteside MD   10 mL at 12/10/22 2138    sodium chloride flush 0.9 % injection 5-40 mL  5-40 mL IntraVENous PRN Sobeida Whiteside MD        0.9 % sodium chloride infusion   IntraVENous PRN Sobeida Whiteside MD        ondansetron (ZOFRAN-ODT) disintegrating tablet 4 mg  4 mg Oral Q8H PRN Sobeida Whiteside MD        Or    ondansetron [facility]) injection 4 mg  4 mg IntraVENous Q6H PRN Sobeida Whiteside MD        polyethylene glycol (GLYCOLAX) packet 17 g  17 g Oral Daily PRN Sobeida Whiteside MD        acetaminophen (TYLENOL) tablet 650 mg  650 mg Oral Q6H PRN Sobeida Whiteside MD        Or    acetaminophen (TYLENOL) suppository 650 mg  650 mg Rectal Q6H PRN Sobeida Whiteside MD        glucose chewable tablet 16 g  4 tablet Oral PRN Sobeida Whiteside MD        dextrose bolus 10% 125 mL  125 mL IntraVENous PRN Sobeida Whiteside MD        Or    dextrose bolus 10% 250 mL  250 mL IntraVENous PRN Sobeida Whiteside MD        glucagon (rDNA) injection 1 mg  1 mg SubCUTAneous PRN Lasha Collier MD        dextrose 10 % infusion   IntraVENous Continuous PRN Lasha Collier MD        insulin lispro (HUMALOG) injection vial 0-4 Units  0-4 Units SubCUTAneous TID WC Lasha Collier MD        insulin lispro (HUMALOG) injection vial 0-4 Units  0-4 Units SubCUTAneous Nightly Lasha Collier MD        oxyCODONE-acetaminophen (PERCOCET) 7.5-325 MG per tablet 1 tablet  1 tablet Oral Q8H PRN Lasha Collier MD   1 tablet at 12/10/22 1739    labetalol (NORMODYNE;TRANDATE) injection 10 mg  10 mg IntraVENous Q4H PRN Ethel Darby MD        labetalol (NORMODYNE;TRANDATE) injection 20 mg  20 mg IntraVENous Q4H PRN Ethel Darby MD        ipratropium-albuterol (DUONEB) nebulizer solution 1 ampule  1 ampule Inhalation Q4H PRN Lasha Collier MD   1 ampule at 12/09/22 2100     Review of Systems   Constitutional: Negative. Respiratory: Negative. Cardiovascular: Negative. Gastrointestinal: Negative. Neurological: Negative. Objective:     Telemetry monitor: SR    Physical Exam:  Constitutional:  Comfortable and alert, NAD, appears older than stated age, chronically ill  Eyes: PERRL, sclera nonicteric  Neck:  Supple, no masses, no thyroidmegaly, JVD difficult to assess  Skin:  Warm and dry; no rash or lesions  Heart:  Regular, normal apex, S1 and S2 normal, no M/G/R  Lungs:  Normal respiratory effort; clear; no wheezing/rhonchi/rales  Abdomen: soft, non tender, + bowel sounds  Extremities:  No edema or cyanosis; no clubbing  Neuro: alert and oriented, moves legs and arms equally, normal mood and affect    Data Reviewed:    Echo 12/2022:   Limited only f/u for LVEF and pericardial effusion. The left ventricular systolic function is severely reduced with an ejection   fraction of 25- 30%. Changes noted from previous echo on 9- in left ventricular function. There is a trivial to small localized near left and right ventricle   pericardial effusion noted.    No tamponade physiology. Coronary angiogram 6/2022:  High risk abnormal stress test  Ischemic or mildly  Non-STEMI  PROCEDURES PERFORMED    Right heart catheterization  Left heart catheterization  LVgram  Coronary angiogam  Coronary cath  Monitoring of moderate conscious sedation  PROCEDURE DESCRIPTION   Risks/benefits/alternatives/outcomes were discussed with patient and/or family and informed consent was obtained. Using the [facility] scale, the patient's right radial artery was found to be a level B. Patient was prepped draped in the usual sterile fashion. Local anaesthetic was applied over puncture site. Using ultrasound guidance and a front wall technique, a 4/5 [place] Terumo sheath was inserted into right radial artery. Verapamil, nitroglycerin, nicardipine were administered through the sheath. Heparin was administered. Diagnostic 5 [place] pigtail, TIG, Binu catheters were used for diagnostic angiograms. At the conclusion of the procedure, a TR band was placed over the puncture site and hemostasis was obtained. There were no immediate complications. I supervised sedation from 9:53 AM to 11:08 AM with versed 5 mg/fentanyl 125 mcg during the procedure. An independent trained observer pushed meds at my direction. We monitored the patient's level of consciousness and vital signs/physiologic status throughout the procedure duration (see times listed previously). 205 cc contrast was utilized. <20cc EBL. Case/findings/plans discussed with patient's wife, she understood/agreed.   FINDINGS      CHAMBER PRESSURE SATURATION   RA  10 60%   RV  47/9     PA  50/20  67%   PW  19     AORTA  101/50  95%      NANCY CARDIAC OUTPUT  5.8 L/min   SVR  756    PVR  234       LVEDP  21   GRADIENT ACROSS AORTIC VALVE  less than 5 mmHg   LV FUNCTION EF 20%   WALL MOTION  severe global hypokinesis with regional variation   MITRAL REGURGITATION  mild      LM Less than 10% [id number] stenosis            LAD Moderately calcified, 20 to 30% proximal-mid stenosis, distal vessel tapers at the apex with 60% diffuse disease. D1 has an ostial/proximal 75 to 80% stenosis. LCX  Calcified, proximal-mid 75 to 80% stenosis. Distal vessel is tortuous with 70 to 85% diffuse stenosis with more discrete stenosis noted distally. OM 1 is a very small vessel with ostial/proximal 80% stenosis and 60 to 70% diffuse disease in the tnftzwla-dgu-ywjtne vessel. Isatu Pantoja RCA Dominant, calcified, tortuous, there is a proximal-mid stent with 60 to 70% in-stent restenosis. Distal vessel 20 to 30% stenosis      CONCLUSIONS:    Mild to moderately increased filling pressures  Patent RCA stent with moderate to borderline severe in-stent restenosis  Severe circumflex and D1 stenosis  Continue medical management, consider outpatient high risk PCI of above territories pending outpatient clinical follow-up. Currently medical management seems best given comorbidities and need for additional fluid removal.  If PCI is pursued, will likely need general anesthesia. Patient had difficulty lying still on Cath Lab table. Okay to resume Eliquis tomorrow, case/plan/findings discussed with patient and wife, they understand/agree, they stated it is incorrectly stated in chart the patient would refuse blood, he is okay to receive blood products if needed. Continue beta-blocker and Entresto     Stress test 6/2/2022:   Severe LV dysfunction EF 26%    Global hypokinesis with regional variation, more pronounced in inferolateral    wall and apex    Large mainly fixed defect in inferior wall, extends to portions of lateral    wall and apex with moderate darrick-infarct ischemia      Echo 6/3/2022:  Definity® used for myocardial border enhancement. Left ventricular systolic function is severely reduced with a visually   estimated ejection fraction of 20-25 %. EF estimated by Jasmine's method at 24 %.    The left ventricle is mildly dilated in size with normal wall thickness. Severe global hypokinesis with regional variation. Grade I diastolic dysfunction with normal LV pressure. There is no evidence of a left ventricular thrombus. Right ventricular systolic function is reduced. A 29 mm Langford Leyda S3 bioprosthetic aortic valve appears well seated   with normal Doppler values. Inadequate tricuspid valve regurgitation to estimate systolic pulmonary   artery pressure. There is a moderate left pleural effusion noted.     Lab Reviewed:     Renal Profile:  Lab Results   Component Value Date/Time    CREATININE 7.1 12/09/2022 05:26 AM    BUN 70 12/09/2022 05:26 AM     12/09/2022 05:26 AM    K 4.9 12/09/2022 05:26 AM    K 3.8 12/02/2022 11:54 PM    CL 93 12/09/2022 05:26 AM    CO2 22 12/09/2022 05:26 AM     CBC:    Lab Results   Component Value Date/Time    WBC 8.7 12/09/2022 05:26 AM    RBC 2.76 12/09/2022 05:26 AM    HGB 8.3 12/09/2022 05:26 AM    HCT 25.2 12/09/2022 05:26 AM    MCV 91.2 12/09/2022 05:26 AM    RDW 15.8 12/09/2022 05:26 AM     12/09/2022 05:26 AM     BNP:    Lab Results   Component Value Date/Time    PROBNP >70,000 12/10/2022 05:17 AM    PROBNP >70,000 12/04/2022 12:00 PM    PROBNP >70,000 12/02/2022 11:54 PM    PROBNP >70,000 11/25/2022 09:50 AM    PROBNP >70,000 10/18/2022 09:58 PM     Fasting Lipid Panel:    Lab Results   Component Value Date/Time    CHOL 138 12/05/2022 09:10 AM    HDL 58 12/05/2022 09:10 AM    TRIG 75 12/05/2022 09:10 AM     Cardiac Enzymes:  CK/MbTroponin  Lab Results   Component Value Date/Time    CKTOTAL 45 05/25/2022 02:35 AM    TROPONINI 0.14 12/04/2022 12:00 PM     PT/ INR   Lab Results   Component Value Date/Time    INR 1.06 10/18/2022 09:58 PM    INR 1.37 07/17/2022 03:10 AM    INR 1.19 06/22/2022 10:35 AM    PROTIME 13.8 10/18/2022 09:58 PM    PROTIME 16.7 07/17/2022 03:10 AM    PROTIME 14.9 06/22/2022 10:35 AM     PTT No results found for: PTT   Lab Results   Component Value Date/Time    MG 1.80 12/09/2022 05:26 AM      Lab Results   Component Value Date/Time    TSH 2.15 03/17/2022 08:33 PM     All labs and imaging reviewed today    Assessment:  Elevated troponin: chronic elevation due to poor clearance with ESRD  CAD: LCx and Diag lesions along with moderate-severe RCA restenosis, medical management due to comorbidities 6/2022; s/p PATRICIA RCA 1/14/2022; s/p PATRICIA LAD 2015  HFrEF  Bicuspid AV/severe AS: s/p TAVR 10/2019  Ischemic cardiomyopathy: EF 20-25% on echo 6/3/2022; EF 40-45% on echo 1/12/2022  Paroxysmal atrial fibrillation: stable              - [id number] score >2 on eliquis  PVD: s/p PTA/stent R external iliac 5/2019  Bradycardia/transient sinus pauses: noted 12/12/2020, no recurrence  Pleural effusion  Acute on chronic respiratory failure  HTN: stable  HLD: improved, LDL 65, statin and recheck  ESRD: on HD MWF; follows with Dr. Aldo Nash  DM: hgb a1c 7  Anemia  ZAINAB  History of CVA    Plan:   1. Fluid management with HD  2. Continue aspirin while plavix and eliquis held (for thoracentesis), had PCI 1/2022; ok to stop aspirin once plavix and eliquis restarted; if prolonged period off of AC anticipated, consider IV heparin  3. Continue statin, isordil, toprol; need to investigate why entresto stopped  4. Plans for thoracentesis? 5. Optimize GDMT and reassess EF in follow up, if EF remains <35%, consider ICD  6. Cardiology will sign off, please call if needed.     JAY Damon-CNP  [facility]  [phone]

## 2022-12-11 NOTE — PROGRESS NOTES
Hospitalist Progress Note      PCP: Pcp No    Date of Admission: 12/4/2022    Chief Complaint: [address] Course: reviewed      Subjective: resting in bed, no sob, off bipap currently, noted some sob this morning when off bipap      Medications:  Reviewed    Infusion Medications    sodium chloride      dextrose       Scheduled Medications    aspirin  81 mg Oral Daily    epoetin siddharth-epbx  10,000 Units IntraVENous Once per day on Mon Wed Fri    [Held by provider] apixaban  2.5 mg Oral BID    calcium acetate  1 capsule Oral TID     [Held by provider] clopidogrel  75 mg Oral Daily    DULoxetine  30 mg Oral Daily    isosorbide dinitrate  20 mg Oral TID    metoprolol succinate  100 mg Oral BID    pantoprazole  40 mg Oral QAM AC    pravastatin  40 mg Oral Daily    QUEtiapine  50 mg Oral Nightly    sodium chloride flush  5-40 mL IntraVENous 2 times per day    insulin lispro  0-4 Units SubCUTAneous TID WC    insulin lispro  0-4 Units SubCUTAneous Nightly     PRN Meds: heparin (porcine), perflutren lipid microspheres, calcium carbonate, gabapentin, sodium chloride flush, sodium chloride, ondansetron **OR** ondansetron, polyethylene glycol, acetaminophen **OR** acetaminophen, glucose, dextrose bolus **OR** dextrose bolus, glucagon (rDNA), dextrose, oxyCODONE-acetaminophen, labetalol, labetalol, ipratropium-albuterol      Intake/Output Summary (Last 24 hours) at 12/11/2022 0924  Last data filed at 12/11/2022 0842  Gross per 24 hour   Intake 1460 ml   Output 0 ml   Net 1460 ml       Physical Exam Performed:    /78   Pulse 57   Temp 97.6 °F (36.4 °C) (Oral)   Resp 16   Ht 5' 9\" (1.753 m)   Wt 229 lb 4.5 oz (104 kg)   SpO2 98%   BMI 33.86 kg/m²   General appearance:  no distress, appears stated age and cooperative. HEENT: Pupils equal, round, and reactive to light. Conjunctivae/corneas clear. Neck: Supple, with full range of motion. No jugular venous distention. Trachea midline.   Respiratory: improved respiratory effort. Clear to auscultation, bilaterally without Rales/Wheezes/Rhonchi. Dullness on left lung field seems less  Cardiovascular: Regular rate and rhythm with normal S1/S2 without murmurs, rubs or gallops. Abdomen: Soft, non-tender, non-distended with normal bowel sounds. Musculoskeletal: No clubbing, cyanosis or edema bilaterally. Full range of motion without deformity. Skin: Skin color, texture, turgor normal.  No rashes or lesions. Neurologic:  Neurovascularly intact without any focal sensory/motor deficits. Cranial nerves: II-XII intact, grossly non-focal.  Psychiatric: Alert and oriented, thought content appropriate, normal insight  Capillary Refill: Brisk, 3 seconds, normal   Peripheral Pulses: +2 palpable, equal bilaterally          Labs:   Recent Labs     12/09/22  0526   WBC 8.7   HGB 8.3*   HCT 25.2*        Recent Labs     12/09/22  0526   *   K 4.9   CL 93*   CO2 22   BUN 70*   CREATININE 7.1*   CALCIUM 8.2*   PHOS 5.8*     No results for input(s): AST, ALT, BILIDIR, BILITOT, ALKPHOS in the last 72 hours. No results for input(s): INR in the last 72 hours. No results for input(s): Estle Carrow in the last 72 hours. Urinalysis:      Lab Results   Component Value Date/Time    NITRU Negative 12/04/2022 01:19 PM    WBCUA 6-9 12/04/2022 01:19 PM    BACTERIA 3+ 05/29/2022 12:51 PM    RBCUA 0-2 12/04/2022 01:19 PM    BLOODU TRACE-INTACT 12/04/2022 01:19 PM    SPECGRAV 1.020 12/04/2022 01:19 PM    GLUCOSEU 500 12/04/2022 01:19 PM       Radiology:  XR CHEST (2 VW)   Final Result   Relatively stable left pleural effusion with increasing pattern of vascular   congestion centrally. XR CHEST (2 VW)   Final Result   Stable left pleural effusion with associated airspace change in the left   lower lobe. Improved aeration in the right lung.          XR CHEST PORTABLE   Final Result   Worsening aeration in the right lower lung zone, otherwise no significant   change compared with 12/02/2022. US THORACENTESIS Which side should the procedure be performed? Left    (Results Pending)       IP CONSULT TO HOSPITALIST  IP CONSULT TO HEART FAILURE NURSE/COORDINATOR  IP CONSULT TO DIETITIAN  IP CONSULT TO NEPHROLOGY  IP CONSULT TO PULMONOLOGY  IP CONSULT TO CASE MANAGEMENT  IP CONSULT TO CARDIOLOGY  IP CONSULT TO CARDIOTHORACIC SURGERY    Assessment/Plan:    Active Hospital Problems    Diagnosis     Acute on chronic combined systolic and diastolic heart failure (HCC) [I50.43]      Priority: High    Ischemic cardiomyopathy [I25.5]      Priority: High    DM2 (diabetes mellitus, type 2) ([facility] [E11.9]      Priority: High    Type 2 myocardial infarction ([facility] [X18. A1]      Priority: Medium    Compression atelectasis [J98.11]      Priority: Medium    Acute on chronic respiratory failure (HCC) [J96.20]      Priority: Medium    SOB (shortness of breath) [R06.02]      Priority: Medium    Former smoker [I07.927]      Priority: Medium    History of transcatheter aortic valve replacement (TAVR) [Z95.2]      Priority: Medium    Asthma-COPD overlap syndrome ([facility] [J44.9]      Priority: Medium    Grade II diastolic dysfunction [K92.44]     Renovascular hypertension [I15.0]     ESRD (end stage renal disease) on dialysis ([facility] [N18.6, Z99.2]     Pleural effusion on left [J90]     Chronic anemia [D64.9]     ZAINAB (obstructive sleep apnea) [G47.33]     Obesity (BMI 30-39. 9) [E66.9]        Acute on chronic respiratory failure with Hypoxia: Recurrent presentation, just here 2 days prior. Frequent admissions for fluid overload and missed dialysis. However, reports compliance with HD this week. Recent treatment for Pneumonia. Currently, he does have mild leukocytosis but lacking other features of pneumonia. - checked Procalcitonin and elevated compared to prior(chronically elevated)  - continued treatment for presumed volume overload/CHF with IV Lasix and HD. Nephrology consulted.  May need input from Cardiology and Pulmonology (not yet ordered). He does report using NIPPV at home (unclear if CPAP or BIPAP). Wonder if this may need to be adjusted. -repeat cxr 12/6 noted left effusion/recurrent, pulm consulted(did not rec tap, rather adjust HD)   -cards consulted, apprec recs,  - limited echo ordered to r/o pericard effusion  -repeat CXR 12/8 noted stable effusion  -CTsurg consulted, rec thoracentesis at this time, f/u as outpt for pleur-x decision     Acute on chronic combined systolic and diastolic heart failure: Known LVEF 20-25%. Continued IV diuresis and HD. Monitor I/O, weights, BMP. Medical management.    -cards consulted to see if further adjustment in meds needed, rec IV heparin if prolonged off eliquis,   -ok to stop aspirin once plavix/eliquis restarted    Diabetes Mellitus, Type 2: Diet Controlled. Recheck HbA1c and 7.0  - Low SSI if needed. Morbid Obesity -  With Body mass index is 32.49 kg/m². Complicating assessment and treatment. Placing patient at risk for multiple co-morbidities as well as early death and contributing to the patient's presentation. Paroxysmal Atrial Fibrillation: Stable. Continued Eliquis 2.5 mg BID     DVT Prophylaxis: Eliquis(held for thoracentesis)  Diet: ADULT DIET;  Regular; 1000 ml  ADULT ORAL NUTRITION SUPPLEMENT; Lunch, Dinner; Fortified Pudding Oral Supplement  Code Status: Full Code  PT/OT Eval Status: home pt/ot    Dispo - pending thoracentesis(ordered by CT surg)    Appropriate for A1 Discharge Unit: Jazmín Witt MD

## 2022-12-11 NOTE — PROGRESS NOTES
12/11/22 0331   NIV Type   $NIV $Daily Charge   NIV Started/Stopped On   Equipment Type v60   Mode Bilevel   Mask Type Full face mask   Mask Size Medium   Settings/Measurements   IPAP 16 cmH20   CPAP/EPAP 8 cmH2O   Vt (Measured) 625 mL   Rate Ordered 16   Resp 16   FiO2  30 %   Mask Leak (lpm) 35 lpm   Comfort Level Good   Using Accessory Muscles No   SpO2 98   Alarm Settings   Alarms On Y   Low Pressure (cmH2O) 6 cmH2O   High Pressure (cmH2O) 30 cmH2O

## 2022-12-12 ENCOUNTER — APPOINTMENT (OUTPATIENT)
Dept: GENERAL RADIOLOGY | Age: 52
DRG: 280 | End: 2022-12-12
Payer: MEDICARE

## 2022-12-12 ENCOUNTER — APPOINTMENT (OUTPATIENT)
Dept: ULTRASOUND IMAGING | Age: 52
DRG: 280 | End: 2022-12-12
Payer: MEDICARE

## 2022-12-12 LAB
GLUCOSE BLD-MCNC: 164 MG/DL (ref 70–99)
GLUCOSE BLD-MCNC: 203 MG/DL (ref 70–99)
GLUCOSE BLD-MCNC: 262 MG/DL (ref 70–99)
INR BLD: 1.26 (ref 0.87–1.14)
PERFORMED ON: ABNORMAL
PROTHROMBIN TIME: 15.7 SEC (ref 11.7–14.5)

## 2022-12-12 PROCEDURE — 94660 CPAP INITIATION&MGMT: CPT

## 2022-12-12 PROCEDURE — 2060000000 HC ICU INTERMEDIATE R&B

## 2022-12-12 PROCEDURE — 6370000000 HC RX 637 (ALT 250 FOR IP): Performed by: INTERNAL MEDICINE

## 2022-12-12 PROCEDURE — 2500000003 HC RX 250 WO HCPCS: Performed by: INTERNAL MEDICINE

## 2022-12-12 PROCEDURE — 85610 PROTHROMBIN TIME: CPT

## 2022-12-12 PROCEDURE — 6360000002 HC RX W HCPCS

## 2022-12-12 PROCEDURE — 94761 N-INVAS EAR/PLS OXIMETRY MLT: CPT

## 2022-12-12 PROCEDURE — 2700000000 HC OXYGEN THERAPY PER DAY

## 2022-12-12 PROCEDURE — 0W9B3ZZ DRAINAGE OF LEFT PLEURAL CAVITY, PERCUTANEOUS APPROACH: ICD-10-PCS | Performed by: STUDENT IN AN ORGANIZED HEALTH CARE EDUCATION/TRAINING PROGRAM

## 2022-12-12 PROCEDURE — 2580000003 HC RX 258: Performed by: INTERNAL MEDICINE

## 2022-12-12 PROCEDURE — 90935 HEMODIALYSIS ONE EVALUATION: CPT

## 2022-12-12 PROCEDURE — 94640 AIRWAY INHALATION TREATMENT: CPT

## 2022-12-12 PROCEDURE — 32555 ASPIRATE PLEURA W/ IMAGING: CPT

## 2022-12-12 PROCEDURE — 71045 X-RAY EXAM CHEST 1 VIEW: CPT

## 2022-12-12 PROCEDURE — 6370000000 HC RX 637 (ALT 250 FOR IP): Performed by: NURSE PRACTITIONER

## 2022-12-12 RX ORDER — CHOLECALCIFEROL (VITAMIN D3) 10 MCG
1 TABLET ORAL DAILY
Status: DISCONTINUED | OUTPATIENT
Start: 2022-12-12 | End: 2022-12-14 | Stop reason: HOSPADM

## 2022-12-12 RX ADMIN — QUETIAPINE FUMARATE 50 MG: 25 TABLET ORAL at 19:57

## 2022-12-12 RX ADMIN — METOPROLOL SUCCINATE 100 MG: 50 TABLET, EXTENDED RELEASE ORAL at 19:56

## 2022-12-12 RX ADMIN — DULOXETINE HYDROCHLORIDE 30 MG: 30 CAPSULE, DELAYED RELEASE ORAL at 08:36

## 2022-12-12 RX ADMIN — SODIUM CHLORIDE, PRESERVATIVE FREE 10 ML: 5 INJECTION INTRAVENOUS at 19:58

## 2022-12-12 RX ADMIN — PRAVASTATIN SODIUM 40 MG: 40 TABLET ORAL at 08:36

## 2022-12-12 RX ADMIN — ISOSORBIDE DINITRATE 20 MG: 20 TABLET ORAL at 08:35

## 2022-12-12 RX ADMIN — ISOSORBIDE DINITRATE 20 MG: 20 TABLET ORAL at 15:26

## 2022-12-12 RX ADMIN — PANTOPRAZOLE SODIUM 40 MG: 40 TABLET, DELAYED RELEASE ORAL at 08:52

## 2022-12-12 RX ADMIN — OXYCODONE AND ACETAMINOPHEN 1 TABLET: 7.5; 325 TABLET ORAL at 08:36

## 2022-12-12 RX ADMIN — CALCIUM ACETATE 667 MG: 667 CAPSULE ORAL at 08:36

## 2022-12-12 RX ADMIN — EPOETIN ALFA-EPBX 10000 UNITS: 10000 INJECTION, SOLUTION INTRAVENOUS; SUBCUTANEOUS at 13:31

## 2022-12-12 RX ADMIN — SODIUM CHLORIDE, PRESERVATIVE FREE 10 ML: 5 INJECTION INTRAVENOUS at 08:38

## 2022-12-12 RX ADMIN — ISOSORBIDE DINITRATE 20 MG: 20 TABLET ORAL at 19:57

## 2022-12-12 RX ADMIN — NEPHROCAP 1 MG: 1 CAP ORAL at 14:53

## 2022-12-12 RX ADMIN — CALCIUM ACETATE 667 MG: 667 CAPSULE ORAL at 17:14

## 2022-12-12 RX ADMIN — ASPIRIN 81 MG: 81 TABLET, CHEWABLE ORAL at 08:35

## 2022-12-12 ASSESSMENT — PAIN SCALES - GENERAL
PAINLEVEL_OUTOF10: 0
PAINLEVEL_OUTOF10: 0
PAINLEVEL_OUTOF10: 8

## 2022-12-12 NOTE — CARE COORDINATION
Case Management Follow up        Chart reviewed and case discussed during huddle and rounds. Pt is admitted day # 5. Unit C-4. Diagnosis and currently status as per MD progress: ESRD- dialysis today, Nephrology following. Acute on chronic resp. Failure with hypoxia- recurrent, back on Bipap. Frequent admissions. Hx of noncompliance. CHF- continue diuresis. CTS consulted for poss. Pleurex- unable to place due to Plavix and Eliquis. Echo to r/o pericardial effusion- L pleural effusion with thoracentesis planned today. Services following:IM, Nephrology, Cardiology      Anticipated Discharge date: pending course     Expected Plan for Discharge: EGS pre cert approved Friday 12/9, good through today 12/12. Facility aware of need for NIV at d/c. Elvis Amato states that she will arrange. This RN CM will initiate new cert tomorrow if the Pt is ready. Pre cert needed: yes     Potential Barriers:none     RN CM will continue to follow Pt clinical course for DCP needs.

## 2022-12-12 NOTE — PROGRESS NOTES
12/12/22 0327   NIV Type   Equipment Type v60   Mode Bilevel   Mask Type Full face mask   Mask Size Medium   Settings/Measurements   IPAP 16 cmH20   CPAP/EPAP 8 cmH2O   Vt (Measured) 545 mL   Rate Ordered 16   Resp 17   FiO2  30 %   I Time/ I Time % 1.05 s   Minute Volume (L/min) 8.7 Liters   Comfort Level Good   Using Accessory Muscles No   Patient's Home Machine No   Alarm Settings   Alarms On Y   Low Pressure (cmH2O) 6 cmH2O   High Pressure (cmH2O) 30 cmH2O   Apnea (secs) 20 secs   RR Low (bpm) 6   RR High (bpm) 40 br/min

## 2022-12-12 NOTE — PROGRESS NOTES
12/11/22 2137   NIV Type   Skin Assessment Clean, dry, & intact   NIV Started/Stopped On   Equipment Type v60   Mode Bilevel   Mask Type Full face mask   Mask Size Medium   Settings/Measurements   PIP Observed 17 cm H20   IPAP 16 cmH20   CPAP/EPAP 8 cmH2O   Vt (Measured) 621 mL   Rate Ordered 16   Resp 18   FiO2  30 %   I Time/ I Time % 1.05 s   Minute Volume (L/min) 12 Liters   Mask Leak (lpm) 32 lpm   Comfort Level Good   Using Accessory Muscles No   SpO2 99   Patient's Home Machine No   Alarm Settings   Alarms On Y   Low Pressure (cmH2O) 6 cmH2O   High Pressure (cmH2O) 30 cmH2O   Apnea (secs) 20 secs   RR Low (bpm) 6   RR High (bpm) 40 br/min   Oxygen Therapy/Pulse Ox   SpO2 99 %

## 2022-12-12 NOTE — PROGRESS NOTES
Occupational Therapy Attempt  Pt attempted for OT treatment this date. Pt currently off the floor for dialysis. Will re-attempt as schedule permits and pt medically appropriate.      Thank you,    Chet Washington, OTR/L #[id number]

## 2022-12-12 NOTE — PLAN OF CARE
Problem: Discharge Planning  Goal: Discharge to home or other facility with appropriate resources  Outcome: Progressing  Flowsheets (Taken 12/12/2022 0835)  Discharge to home or other facility with appropriate resources: Identify barriers to discharge with patient and caregiver     Problem: Safety - Adult  Goal: Free from fall injury  Outcome: Progressing     Problem: ABCDS Injury Assessment  Goal: Absence of physical injury  Outcome: Progressing     Problem: Pain  Goal: Verbalizes/displays adequate comfort level or baseline comfort level  Outcome: Progressing     Problem: Chronic Conditions and Co-morbidities  Goal: Patient's chronic conditions and co-morbidity symptoms are monitored and maintained or improved  Outcome: Progressing  Flowsheets (Taken 12/12/2022 0835)  Care Plan - Patient's Chronic Conditions and Co-Morbidity Symptoms are Monitored and Maintained or Improved: Monitor and assess patient's chronic conditions and comorbid symptoms for stability, deterioration, or improvement     Problem: Nutrition Deficit:  Goal: Optimize nutritional status  Outcome: Progressing

## 2022-12-12 NOTE — PROGRESS NOTES
CC:ESRD  HPI     46 y/o male with history of ESRD admitted with acute onset of shortness of breath. He was recently admitted with pneumonia. He says since discharge he has been having fevers and night sweats. He has been compliant with dialysis     SUBJECTIVE  The patient was seen and examined during dialysis; he feels well today with no CP, SOB, nausea or vomiting. ROS: No fever or chills. Social: No family at bedside.     Scheduled Meds:   aspirin  81 mg Oral Daily    epoetin siddharth-epbx  10,000 Units IntraVENous Once per day on Mon Wed Fri    [Held by provider] apixaban  2.5 mg Oral BID    calcium acetate  1 capsule Oral TID     [Held by provider] clopidogrel  75 mg Oral Daily    DULoxetine  30 mg Oral Daily    isosorbide dinitrate  20 mg Oral TID    metoprolol succinate  100 mg Oral BID    pantoprazole  40 mg Oral QAM AC    pravastatin  40 mg Oral Daily    QUEtiapine  50 mg Oral Nightly    sodium chloride flush  5-40 mL IntraVENous 2 times per day    insulin lispro  0-4 Units SubCUTAneous TID     insulin lispro  0-4 Units SubCUTAneous Nightly     Continuous Infusions:   sodium chloride      dextrose       PRN Meds:heparin (porcine), perflutren lipid microspheres, calcium carbonate, gabapentin, sodium chloride flush, sodium chloride, ondansetron **OR** ondansetron, polyethylene glycol, acetaminophen **OR** acetaminophen, glucose, dextrose bolus **OR** dextrose bolus, glucagon (rDNA), dextrose, oxyCODONE-acetaminophen, labetalol, ipratropium-albuterol      Objective:      Physical Exam     Wt Readings from Last 3 Encounters:   12/12/22 231 lb 11.3 oz (105.1 kg)   12/02/22 220 lb (99.8 kg)   11/28/22 253 lb 8.5 oz (115 kg)     Temp Readings from Last 3 Encounters:   12/12/22 98 °F (36.7 °C)   12/03/22 97 °F (36.1 °C)   11/28/22 98.1 °F (36.7 °C)     BP Readings from Last 3 Encounters:   12/12/22 (!) 107/53   12/03/22 (!) 156/75   11/28/22 132/78     Pulse Readings from Last 3 Encounters:   12/12/22 51 12/03/22 91   11/28/22 63     General:  NAD, A+Ox3, ill-appearing, normal body habitus  HEENT:  PERRL, EOMI  Neck:  Supple, normal range of movement  Chest: On bilevel, no wheezing   CV:  Tachycardic, no rub   Abdomen:  NTND, soft, +BS, no hepatosplenomegaly  Extremities:  No peripheral edema  Neurological:  Moving all four extremities, CN II-XII grossly intact  Lymphatics:  No palpable lymph nodes  Skin:  No rash, no jaundice  Psychiatric:  Alert, flat affect  Access:  Left [facility]     Lab Review   Lab Results   Component Value Date    WBC 8.7 12/09/2022    HGB 8.3 (L) 12/09/2022    HCT 25.2 (L) 12/09/2022    MCV 91.2 12/09/2022     12/09/2022     Lab Results   Component Value Date/Time     12/09/2022 05:26 AM    K 4.9 12/09/2022 05:26 AM    K 3.8 12/02/2022 11:54 PM    CL 93 12/09/2022 05:26 AM    CO2 22 12/09/2022 05:26 AM    BUN 70 12/09/2022 05:26 AM    CREATININE 7.1 12/09/2022 05:26 AM    GLUCOSE 135 12/09/2022 05:26 AM    CALCIUM 8.2 12/09/2022 05:26 AM            Patient Active Problem List    Diagnosis Date Noted    Hypotension due to drugs 11/25/2017    Acute on chronic combined systolic and diastolic heart failure (HCC)     Ischemic cardiomyopathy     Dyslipidemia     DM2 (diabetes mellitus, type 2) ([facility] 03/04/2014    Bicuspid aortic valve 06/03/2013    CHF (congestive heart failure) ([facility] 05/14/2013    CAD (coronary artery disease) 05/14/2013    PVD (peripheral vascular disease) ([facility] 05/14/2013    Type 2 myocardial infarction ([facility] 12/08/2022    Compression atelectasis 12/06/2022    Acute on chronic respiratory failure ([facility] 12/04/2022    Acute pneumonia 11/25/2022    Hypervolemia 10/19/2022    Pericardial effusion 09/28/2022    HFrEF (heart failure with reduced ejection fraction) [facility] 09/24/2022    Acute respiratory failure with hypoxia and hypercapnia ([facility] 09/23/2022    Hypertensive emergency 07/17/2022    SOB (shortness of breath) 07/17/2022    Altered mental status     Hypoglycemia 11/12/2018    Pulmonary edema 11/09/2018    Fluid overload 11/09/2018    Renovascular hypertension     Mixed hyperlipidemia     Cigarette nicotine dependence in remission     Neuromuscular disorder (HCC)     Acute diastolic CHF (congestive heart failure) ([address] 03/18/2018    Hemodialysis-associated hypotension 11/22/2017    ESRD (end stage renal disease) on dialysis ([address] 11/22/2017    Mucus plugging of bronchi     Nonrheumatic aortic valve stenosis     Pleural effusion on left     Chronic anemia     History of CVA (cerebrovascular accident)     Type 2 diabetes mellitus without complication, without long-term current use of insulin ([facility]     ZAINAB (obstructive sleep apnea)     Tobacco abuse 05/21/2017    CVA (cerebral vascular accident) ([address] 05/21/2017    Angina, class IV ([address]     Dyspnea     Gastroparesis due to DM ([address]     Biliary colic 75/33/7233    Symptomatic cholelithiasis 01/04/2017    Degeneration of lumbar or lumbosacral intervertebral disc 03/18/2016    Lumbar radiculopathy 03/18/2016    Lumbosacral spondylosis without myelopathy 03/18/2016    ZAINAB on CPAP 02/11/2016    Obesity (BMI 30-39. 9)     PNA (pneumonia) 03/28/2015    Depression with anxiety 06/05/2014    Passive smoke exposure 05/30/2014    Diabetic neuropathy ([address] 11/14/2013    Claudication in peripheral vascular disease ([address] 06/26/2013    Bilateral hilar adenopathy syndrome 06/03/2013    Sepsis without acute organ dysfunction ([address] 12/25/2012       ASSESSMENT AND PLAN     #ESRD:On dialysis TTS at [facility]  Prior target weight of 102 kg but after recent hospitalization he was down to 99 kg ( outpatient )  -Has working left [employer]. Prior fistula with dialysis associated steal syndrome and he had severe needle phobia so it was not used and ligated  -UF ongoing for 2 hours  -HD per TThS schedule    #Shortness of breath:multifactorial  Recent pneumonia.     Recurrent L pleural effusion  -pulmonary following; agree that he may need pleurodesis, or pleurX  -po fluid restriction , decrease DW at HD as tolerated   -UF today for 2 hours    #Anemia of chronic disease:  Getting DAVON at dialysis    #Hypertension:  Range improving

## 2022-12-12 NOTE — PROGRESS NOTES
12/12/22 0057   NIV Type   $NIV $Daily Charge   Equipment Type v60   Mode Bilevel   Mask Type Full face mask   Mask Size Medium   Settings/Measurements   IPAP 16 cmH20   CPAP/EPAP 8 cmH2O   Vt (Measured) 712 mL   Rate Ordered 16   Resp 17   FiO2  30 %   I Time/ I Time % 1.05 s   Mask Leak (lpm) 58 lpm   Humidity 12   Comfort Level Good   Using Accessory Muscles No   SpO2 96   Patient's Home Machine No   Alarm Settings   Alarms On Y   Low Pressure (cmH2O) 6 cmH2O   High Pressure (cmH2O) 30 cmH2O   Apnea (secs) 20 secs   RR Low (bpm) 6   RR High (bpm) 40 br/min

## 2022-12-12 NOTE — DISCHARGE INSTRUCTIONS
Heart Failure Resources:    Heart Failure Interactive Workbook:   Go to www.ksw"[email or web address]/aha-heartfailure for a Free Heart Failure Interactive Workbook provided by Melissa. This interactive workbook will provide information on Healthier Living with Heart Failure. Please copy and paste link into search bar. Use your mouse to scroll through the pages. HF [place] stephanie:   Heart Failure Free smart phone stephanie available for iPhone and Android download. Use your phone to track sodium intake, fluid intake, symptoms, and weight. Low Sodium Diet:  Go to www. [employer]. org website for [employer] which is Low Sodium! [employer] is a dialysis company, but this website offers free seasonal cookbooks. Each quarter, they will release 25-30 new recipes with a breakdown of calories, sodium, and glucose. Recipes:   Go to [email or web address] website for free recipes.

## 2022-12-12 NOTE — DIALYSIS
Treatment time: 2 hours  Net UF: 2342ml     Pre weight: 105.1 kg  Post weight:102.8 kg  EDW: 99 kg  Crit Line Used: yes Ending Profile: a  Refill Present: yes    Access used: r tdc    Access function: good with  ml/min     Medications or blood products given: retacrit [id number]      Regular outpatient schedule: T/TH/SAT     Summary of response to treatment: Patient tolerated treatment well and without any complications. Patient remained stable throughout entire treatment and upon the patient exiting the dialysis suite via transport. Report given to Tameka Jovel RN and copy of dialysis treatment record placed in chart, to be scanned into EMR.

## 2022-12-12 NOTE — PROGRESS NOTES
Hospitalist Progress Note      PCP: Pcp No    Date of Admission: 12/4/2022    Chief Complaint: [facility] Course: reviewed      Subjective: resting in bed. Complaining of shortness of breath today. No chest pain. Back on Bipap. Getting thoracentesis and HD today     Medications:  Reviewed    Infusion Medications    sodium chloride      dextrose       Scheduled Medications    aspirin  81 mg Oral Daily    epoetin siddharth-epbx  10,000 Units IntraVENous Once per day on Mon Wed Fri    [Held by provider] apixaban  2.5 mg Oral BID    calcium acetate  1 capsule Oral TID     [Held by provider] clopidogrel  75 mg Oral Daily    DULoxetine  30 mg Oral Daily    isosorbide dinitrate  20 mg Oral TID    metoprolol succinate  100 mg Oral BID    pantoprazole  40 mg Oral QAM AC    pravastatin  40 mg Oral Daily    QUEtiapine  50 mg Oral Nightly    sodium chloride flush  5-40 mL IntraVENous 2 times per day    insulin lispro  0-4 Units SubCUTAneous TID WC    insulin lispro  0-4 Units SubCUTAneous Nightly     PRN Meds: heparin (porcine), perflutren lipid microspheres, calcium carbonate, gabapentin, sodium chloride flush, sodium chloride, ondansetron **OR** ondansetron, polyethylene glycol, acetaminophen **OR** acetaminophen, glucose, dextrose bolus **OR** dextrose bolus, glucagon (rDNA), dextrose, oxyCODONE-acetaminophen, labetalol, ipratropium-albuterol      Intake/Output Summary (Last 24 hours) at 12/12/2022 0926  Last data filed at 12/12/2022 0600  Gross per 24 hour   Intake 960 ml   Output 0 ml   Net 960 ml         Physical Exam Performed:    /79   Pulse 62   Temp 97.9 °F (36.6 °C) (Oral)   Resp 28   Ht 5' 9\" (1.753 m)   Wt 229 lb 4.5 oz (104 kg)   SpO2 100%   BMI 33.86 kg/m²   General appearance:  no distress, appears stated age and cooperative. HEENT: Pupils equal, round, and reactive to light. Conjunctivae/corneas clear. Neck: Supple, with full range of motion. No jugular venous distention.  Trachea midline. Respiratory:   Clear to auscultation, bilaterally without Rales/Wheezes/Rhonchi. Decreased breath sounds bilaterally   Cardiovascular: Regular rate and rhythm with normal S1/S2 without murmurs, rubs or gallops. Abdomen: Soft, non-tender, non-distended with normal bowel sounds. Musculoskeletal: No clubbing, cyanosis or edema bilaterally. Full range of motion without deformity. Skin: Skin color, texture, turgor normal.  No rashes or lesions. Neurologic:  Neurovascularly intact without any focal sensory/motor deficits. Cranial nerves: II-XII intact, grossly non-focal.  Psychiatric: Alert and oriented, thought content appropriate, normal insight  Capillary Refill: Brisk, 3 seconds, normal   Peripheral Pulses: +2 palpable, equal bilaterally          Labs:   No results for input(s): WBC, HGB, HCT, PLT in the last 72 hours. No results for input(s): NA, K, CL, CO2, BUN, CREATININE, CALCIUM, PHOS in the last 72 hours. Invalid input(s): MAGNES    No results for input(s): AST, ALT, BILIDIR, BILITOT, ALKPHOS in the last 72 hours. No results for input(s): INR in the last 72 hours. No results for input(s): Pamla Clines in the last 72 hours. Urinalysis:      Lab Results   Component Value Date/Time    NITRU Negative 12/04/2022 01:19 PM    WBCUA 6-9 12/04/2022 01:19 PM    BACTERIA 3+ 05/29/2022 12:51 PM    RBCUA 0-2 12/04/2022 01:19 PM    BLOODU TRACE-INTACT 12/04/2022 01:19 PM    SPECGRAV 1.020 12/04/2022 01:19 PM    GLUCOSEU 500 12/04/2022 01:19 PM       Radiology:  XR CHEST (2 VW)   Final Result   Relatively stable left pleural effusion with increasing pattern of vascular   congestion centrally. XR CHEST (2 VW)   Final Result   Stable left pleural effusion with associated airspace change in the left   lower lobe. Improved aeration in the right lung.          XR CHEST PORTABLE   Final Result   Worsening aeration in the right lower lung zone, otherwise no significant   change compared with 12/02/2022. US THORACENTESIS Which side should the procedure be performed? Left    (Results Pending)       IP CONSULT TO HOSPITALIST  IP CONSULT TO HEART FAILURE NURSE/COORDINATOR  IP CONSULT TO DIETITIAN  IP CONSULT TO NEPHROLOGY  IP CONSULT TO PULMONOLOGY  IP CONSULT TO CASE MANAGEMENT  IP CONSULT TO CARDIOLOGY  IP CONSULT TO CARDIOTHORACIC SURGERY    Assessment/Plan:    Active Hospital Problems    Diagnosis     Acute on chronic combined systolic and diastolic heart failure (HCC) [I50.43]      Priority: High    Ischemic cardiomyopathy [I25.5]      Priority: High    DM2 (diabetes mellitus, type 2) ([facility] [E11.9]      Priority: High    Type 2 myocardial infarction ([facility] [W34. A1]      Priority: Medium    Compression atelectasis [J98.11]      Priority: Medium    Acute on chronic respiratory failure (HCC) [J96.20]      Priority: Medium    SOB (shortness of breath) [R06.02]      Priority: Medium    Former smoker [U92.079]      Priority: Medium    History of transcatheter aortic valve replacement (TAVR) [Z95.2]      Priority: Medium    Asthma-COPD overlap syndrome ([facility] [J44.9]      Priority: Medium    Grade II diastolic dysfunction [B41.71]     Renovascular hypertension [I15.0]     ESRD (end stage renal disease) on dialysis ([facility] [N18.6, Z99.2]     Pleural effusion on left [J90]     Chronic anemia [D64.9]     ZAINAB (obstructive sleep apnea) [G47.33]     Obesity (BMI 30-39. 9) [E66.9]        Acute on chronic respiratory failure with Hypoxia: Recurrent presentation, just here 2 days prior. Frequent admissions for fluid overload and missed dialysis. However, reports compliance with HD this week. Recent treatment for Pneumonia. Currently, he does have mild leukocytosis but lacking other features of pneumonia. - checked Procalcitonin and elevated compared to prior(chronically elevated)  - continued treatment for presumed volume overload/CHF with IV Lasix and HD. Nephrology consulted.  May need input from Cardiology and Pulmonology (not yet ordered). He does report using NIPPV at home (unclear if CPAP or BIPAP). Wonder if this may need to be adjusted. -repeat cxr 12/6 noted left effusion/recurrent, pulm consulted(did not rec tap, rather adjust HD)   -cards consulted, apprec recs,  - limited echo ordered to r/o pericard effusion  -repeat CXR 12/8 noted stable effusion  -CTsurg consulted, rec thoracentesis at this time, f/u as outpt for pleur-x decision     Acute on chronic combined systolic and diastolic heart failure: Known LVEF 20-25%. Continued IV diuresis and HD. Monitor I/O, weights, BMP. Medical management.    -cards consulted to see if further adjustment in meds needed, rec IV heparin if prolonged off eliquis,   -ok to stop aspirin once plavix/eliquis restarted  Fluid management with HD. Cardiology will investigate why entresto was stopped. Optimize GDMT and reassess in followup     Diabetes Mellitus, Type 2: Diet Controlled. Recheck HbA1c and 7.0  - Low SSI if needed. Morbid Obesity -  With Body mass index is 32.49 kg/m². Complicating assessment and treatment. Placing patient at risk for multiple co-morbidities as well as early death and contributing to the patient's presentation. Paroxysmal Atrial Fibrillation: Secondary hypercoag state secondary to afib. Stable. Continued Eliquis 2.5 mg BID  Continue aspirin while plavix and eliquis held (for thoracentesis), had PCI 1/2022; ok to stop aspirin once plavix and eliquis restarted; if prolonged period off of AC anticipated, consider IV heparin     DVT Prophylaxis: Eliquis(held for thoracentesis)  Diet: ADULT DIET; Regular; 1000 ml  ADULT ORAL NUTRITION SUPPLEMENT; Lunch, Dinner; Fortified Pudding Oral Supplement  Code Status: Full Code  PT/OT Eval Status: home pt/ot    Dispo - pending thoracentesis(ordered by CT surg).  If improvement of respiratory status can plan for medically stable for discharge in next day or two     Appropriate for A1 Discharge Unit: Jazmín Alegria,

## 2022-12-13 LAB
ALBUMIN SERPL-MCNC: 3.6 G/DL (ref 3.4–5)
ANION GAP SERPL CALCULATED.3IONS-SCNC: 18 MMOL/L (ref 3–16)
BASOPHILS ABSOLUTE: 0.1 K/UL (ref 0–0.2)
BASOPHILS RELATIVE PERCENT: 0.6 %
BUN BLDV-MCNC: 108 MG/DL (ref 7–20)
CALCIUM SERPL-MCNC: 8.7 MG/DL (ref 8.3–10.6)
CHLORIDE BLD-SCNC: 91 MMOL/L (ref 99–110)
CO2: 20 MMOL/L (ref 21–32)
CREAT SERPL-MCNC: 9.5 MG/DL (ref 0.9–1.3)
EOSINOPHILS ABSOLUTE: 0.3 K/UL (ref 0–0.6)
EOSINOPHILS RELATIVE PERCENT: 3.1 %
GFR SERPL CREATININE-BSD FRML MDRD: 6 ML/MIN/{1.73_M2}
GLUCOSE BLD-MCNC: 158 MG/DL (ref 70–99)
GLUCOSE BLD-MCNC: 163 MG/DL (ref 70–99)
GLUCOSE BLD-MCNC: 165 MG/DL (ref 70–99)
GLUCOSE BLD-MCNC: 195 MG/DL (ref 70–99)
GLUCOSE BLD-MCNC: 271 MG/DL (ref 70–99)
HCT VFR BLD CALC: 24.6 % (ref 40.5–52.5)
HEMOGLOBIN: 7.9 G/DL (ref 13.5–17.5)
LYMPHOCYTES ABSOLUTE: 1 K/UL (ref 1–5.1)
LYMPHOCYTES RELATIVE PERCENT: 10.7 %
MCH RBC QN AUTO: 29.2 PG (ref 26–34)
MCHC RBC AUTO-ENTMCNC: 32 G/DL (ref 31–36)
MCV RBC AUTO: 91.1 FL (ref 80–100)
MONOCYTES ABSOLUTE: 0.5 K/UL (ref 0–1.3)
MONOCYTES RELATIVE PERCENT: 5.2 %
NEUTROPHILS ABSOLUTE: 7.8 K/UL (ref 1.7–7.7)
NEUTROPHILS RELATIVE PERCENT: 80.4 %
PDW BLD-RTO: 16.4 % (ref 12.4–15.4)
PERFORMED ON: ABNORMAL
PHOSPHORUS: 6.2 MG/DL (ref 2.5–4.9)
PLATELET # BLD: 292 K/UL (ref 135–450)
PMV BLD AUTO: 8.1 FL (ref 5–10.5)
POTASSIUM SERPL-SCNC: 6.2 MMOL/L (ref 3.5–5.1)
PRO-BNP: ABNORMAL PG/ML (ref 0–124)
RBC # BLD: 2.7 M/UL (ref 4.2–5.9)
SODIUM BLD-SCNC: 129 MMOL/L (ref 136–145)
WBC # BLD: 9.7 K/UL (ref 4–11)

## 2022-12-13 PROCEDURE — 6370000000 HC RX 637 (ALT 250 FOR IP): Performed by: NURSE PRACTITIONER

## 2022-12-13 PROCEDURE — 80069 RENAL FUNCTION PANEL: CPT

## 2022-12-13 PROCEDURE — 94660 CPAP INITIATION&MGMT: CPT

## 2022-12-13 PROCEDURE — 85025 COMPLETE CBC W/AUTO DIFF WBC: CPT

## 2022-12-13 PROCEDURE — 90935 HEMODIALYSIS ONE EVALUATION: CPT

## 2022-12-13 PROCEDURE — 2580000003 HC RX 258: Performed by: INTERNAL MEDICINE

## 2022-12-13 PROCEDURE — 2060000000 HC ICU INTERMEDIATE R&B

## 2022-12-13 PROCEDURE — 94761 N-INVAS EAR/PLS OXIMETRY MLT: CPT

## 2022-12-13 PROCEDURE — 2700000000 HC OXYGEN THERAPY PER DAY

## 2022-12-13 PROCEDURE — 2500000003 HC RX 250 WO HCPCS: Performed by: INTERNAL MEDICINE

## 2022-12-13 PROCEDURE — 83880 ASSAY OF NATRIURETIC PEPTIDE: CPT

## 2022-12-13 PROCEDURE — 6360000002 HC RX W HCPCS

## 2022-12-13 PROCEDURE — 6370000000 HC RX 637 (ALT 250 FOR IP): Performed by: INTERNAL MEDICINE

## 2022-12-13 RX ORDER — DOCUSATE SODIUM 100 MG/1
100 CAPSULE, LIQUID FILLED ORAL PRN
DISCHARGE
Start: 2022-12-13

## 2022-12-13 RX ORDER — HEPARIN SODIUM 1000 [USP'U]/ML
INJECTION, SOLUTION INTRAVENOUS; SUBCUTANEOUS
Status: DISCONTINUED
Start: 2022-12-13 | End: 2022-12-14

## 2022-12-13 RX ORDER — SENNA AND DOCUSATE SODIUM 50; 8.6 MG/1; MG/1
1 TABLET, FILM COATED ORAL PRN
DISCHARGE
Start: 2022-12-13

## 2022-12-13 RX ADMIN — CALCIUM ACETATE 667 MG: 667 CAPSULE ORAL at 08:57

## 2022-12-13 RX ADMIN — OXYCODONE AND ACETAMINOPHEN 1 TABLET: 7.5; 325 TABLET ORAL at 17:35

## 2022-12-13 RX ADMIN — DULOXETINE HYDROCHLORIDE 30 MG: 30 CAPSULE, DELAYED RELEASE ORAL at 08:57

## 2022-12-13 RX ADMIN — CALCIUM ACETATE 667 MG: 667 CAPSULE ORAL at 11:52

## 2022-12-13 RX ADMIN — EPOETIN ALFA-EPBX 10000 UNITS: 10000 INJECTION, SOLUTION INTRAVENOUS; SUBCUTANEOUS at 14:55

## 2022-12-13 RX ADMIN — QUETIAPINE FUMARATE 50 MG: 25 TABLET ORAL at 20:08

## 2022-12-13 RX ADMIN — PRAVASTATIN SODIUM 40 MG: 40 TABLET ORAL at 08:57

## 2022-12-13 RX ADMIN — CALCIUM ACETATE 667 MG: 667 CAPSULE ORAL at 16:35

## 2022-12-13 RX ADMIN — ISOSORBIDE DINITRATE 20 MG: 20 TABLET ORAL at 08:58

## 2022-12-13 RX ADMIN — SODIUM CHLORIDE, PRESERVATIVE FREE 10 ML: 5 INJECTION INTRAVENOUS at 08:58

## 2022-12-13 RX ADMIN — METOPROLOL SUCCINATE 100 MG: 50 TABLET, EXTENDED RELEASE ORAL at 20:08

## 2022-12-13 RX ADMIN — ISOSORBIDE DINITRATE 20 MG: 20 TABLET ORAL at 16:35

## 2022-12-13 RX ADMIN — ISOSORBIDE DINITRATE 20 MG: 20 TABLET ORAL at 20:08

## 2022-12-13 RX ADMIN — NEPHROCAP 1 MG: 1 CAP ORAL at 08:57

## 2022-12-13 RX ADMIN — PANTOPRAZOLE SODIUM 40 MG: 40 TABLET, DELAYED RELEASE ORAL at 05:11

## 2022-12-13 RX ADMIN — SODIUM CHLORIDE, PRESERVATIVE FREE 10 ML: 5 INJECTION INTRAVENOUS at 20:08

## 2022-12-13 RX ADMIN — ASPIRIN 81 MG: 81 TABLET, CHEWABLE ORAL at 08:57

## 2022-12-13 RX ADMIN — METOPROLOL SUCCINATE 100 MG: 50 TABLET, EXTENDED RELEASE ORAL at 08:57

## 2022-12-13 RX ADMIN — OXYCODONE AND ACETAMINOPHEN 1 TABLET: 7.5; 325 TABLET ORAL at 09:03

## 2022-12-13 ASSESSMENT — PAIN SCALES - GENERAL
PAINLEVEL_OUTOF10: 0
PAINLEVEL_OUTOF10: 0
PAINLEVEL_OUTOF10: 8
PAINLEVEL_OUTOF10: 0
PAINLEVEL_OUTOF10: 8

## 2022-12-13 ASSESSMENT — PAIN DESCRIPTION - LOCATION
LOCATION: BACK

## 2022-12-13 NOTE — PROGRESS NOTES
CC:ESRD  HPI     44 y/o male with history of ESRD admitted with acute onset of shortness of breath. He was recently admitted with pneumonia. He says since discharge he has been having fevers and night sweats. He has been compliant with dialysis     SUBJECTIVE  The patient was seen and examined; he feels well today with no CP, SOB, nausea or vomiting. ROS: No fever or chills. Social: No family at bedside.     Scheduled Meds:   b complex-C-folic acid  1 capsule Oral Daily    aspirin  81 mg Oral Daily    epoetin siddharth-epbx  10,000 Units IntraVENous Once per day on Mon Wed Fri    [Held by provider] apixaban  2.5 mg Oral BID    calcium acetate  1 capsule Oral TID WC    [Held by provider] clopidogrel  75 mg Oral Daily    DULoxetine  30 mg Oral Daily    isosorbide dinitrate  20 mg Oral TID    metoprolol succinate  100 mg Oral BID    pantoprazole  40 mg Oral QAM AC    pravastatin  40 mg Oral Daily    QUEtiapine  50 mg Oral Nightly    sodium chloride flush  5-40 mL IntraVENous 2 times per day    insulin lispro  0-4 Units SubCUTAneous TID WC    insulin lispro  0-4 Units SubCUTAneous Nightly     Continuous Infusions:   sodium chloride      dextrose       PRN Meds:heparin (porcine), perflutren lipid microspheres, calcium carbonate, gabapentin, sodium chloride flush, sodium chloride, ondansetron **OR** ondansetron, polyethylene glycol, acetaminophen **OR** acetaminophen, glucose, dextrose bolus **OR** dextrose bolus, glucagon (rDNA), dextrose, oxyCODONE-acetaminophen, labetalol, ipratropium-albuterol      Objective:      Physical Exam     Wt Readings from Last 3 Encounters:   12/13/22 221 lb 9.6 oz (100.5 kg)   12/02/22 220 lb (99.8 kg)   11/28/22 253 lb 8.5 oz (115 kg)     Temp Readings from Last 3 Encounters:   12/13/22 97.6 °F (36.4 °C) (Oral)   12/03/22 97 °F (36.1 °C)   11/28/22 98.1 °F (36.7 °C)     BP Readings from Last 3 Encounters:   12/13/22 138/75   12/03/22 (!) 156/75   11/28/22 132/78     Pulse Readings from Last 3 Encounters:   12/13/22 60   12/03/22 91   11/28/22 63     General:  NAD, A+Ox3, ill-appearing, normal body habitus  HEENT:  PERRL, EOMI  Neck:  Supple, normal range of movement  Chest: On bilevel, no wheezing   CV:  Tachycardic, no rub   Abdomen:  NTND, soft, +BS, no hepatosplenomegaly  Extremities:  No peripheral edema  Neurological:  Moving all four extremities, CN II-XII grossly intact  Lymphatics:  No palpable lymph nodes  Skin:  No rash, no jaundice  Psychiatric:  Alert, flat affect  Access:  Left [facility]     Lab Review   Lab Results   Component Value Date    WBC 8.7 12/09/2022    HGB 8.3 (L) 12/09/2022    HCT 25.2 (L) 12/09/2022    MCV 91.2 12/09/2022     12/09/2022     Lab Results   Component Value Date/Time     12/09/2022 05:26 AM    K 4.9 12/09/2022 05:26 AM    K 3.8 12/02/2022 11:54 PM    CL 93 12/09/2022 05:26 AM    CO2 22 12/09/2022 05:26 AM    BUN 70 12/09/2022 05:26 AM    CREATININE 7.1 12/09/2022 05:26 AM    GLUCOSE 135 12/09/2022 05:26 AM    CALCIUM 8.2 12/09/2022 05:26 AM            Patient Active Problem List    Diagnosis Date Noted    Hypotension due to drugs 11/25/2017    Acute on chronic combined systolic and diastolic heart failure (HCC)     Ischemic cardiomyopathy     Dyslipidemia     DM2 (diabetes mellitus, type 2) (HCC) 03/04/2014    Bicuspid aortic valve 06/03/2013    CHF (congestive heart failure) ([facility] 05/14/2013    CAD (coronary artery disease) 05/14/2013    PVD (peripheral vascular disease) ([facility] 05/14/2013    Type 2 myocardial infarction ([facility] 12/08/2022    Compression atelectasis 12/06/2022    Acute on chronic respiratory failure ([facility] 12/04/2022    Acute pneumonia 11/25/2022    Hypervolemia 10/19/2022    Pericardial effusion 09/28/2022    HFrEF (heart failure with reduced ejection fraction) ([facility] 09/24/2022    Acute respiratory failure with hypoxia and hypercapnia [facility] 09/23/2022    Hypertensive emergency 07/17/2022    SOB (shortness of breath) 07/17/2022 Altered mental status     Hypoglycemia 05/29/2022    Morbid obesity with BMI of 40.0-44.9, adult ([address] 04/26/2022    Former smoker 04/19/2022    Cardiomyopathy ([address] 04/19/2022    History of transcatheter aortic valve replacement (TAVR) 10/19/2019    Uncontrolled hypertension 11/14/2013    Asthma-COPD overlap syndrome ([address] 05/14/2013    Respiratory failure ([address] 04/18/2022    Pulmonary edema with diastolic CHF, NYHA class 3 ([address] 03/17/2022    L[place]ty-rectal abscess     Somnolence     Atrial flutter ([address]     SIRS (systemic inflammatory response syndrome) ([address] 02/21/2022    NSTEMI (non-ST elevated myocardial infarction) ([address] 01/12/2022    Acute respiratory failure with hypercapnia ([address] 11/30/2021    Acute pulmonary edema ([address] 11/30/2021    Grade II diastolic dysfunction 77/51/5598    Shock circulatory ([address] 11/30/2021    Smoker 11/30/2021    Normocytic normochromic anemia 11/30/2021    Respiratory failure with hypoxia ([address] 11/29/2021    Speech problem     Urinary tract infection with hematuria     Acute CVA (cerebrovascular accident) ([address] 09/07/2021    Acute hypoxemic respiratory failure ([address] 08/12/2021    Type 2 diabetes mellitus with hyperglycemia ([address] 06/27/2021    Acute encephalopathy 06/27/2021    Cellulitis and abscess of hand 04/24/2021    Iliac artery occlusion, right (HCC)     Cellulitis of right foot 01/29/2021    Peripheral vascular occlusive disease ([address] 01/02/2021    Ataxia     Weakness of both lower extremities 12/30/2020    Sinus bradycardia 12/30/2020    Sinus pause     GBS (Guillain-[place] syndrome) ([facility])     Bilateral leg weakness 12/04/2020    Bradycardia 10/19/2019    Syncope and collapse 10/19/2019    PAF (paroxysmal atrial fibrillation) ([facility])     Hypercholesteremia 07/30/2019    Chronic bronchitis ([facility] 05/21/2019    Nasal congestion 05/21/2019    Chronic, continuous use of opioids 04/15/2019    Steal syndrome of dialysis vascular access (HCC)     SOB (shortness of breath) on exertion 12/24/2018    Anemia of chronic disease 11/12/2018    Pulmonary edema 11/09/2018    Fluid overload 11/09/2018    Renovascular hypertension     Mixed hyperlipidemia     Cigarette nicotine dependence in remission     Neuromuscular disorder (HCC)     Acute diastolic CHF (congestive heart failure) ([address] 03/18/2018    Hemodialysis-associated hypotension 11/22/2017    ESRD (end stage renal disease) on dialysis ([address] 11/22/2017    Mucus plugging of bronchi     Nonrheumatic aortic valve stenosis     Pleural effusion on left     Chronic anemia     History of CVA (cerebrovascular accident)     Type 2 diabetes mellitus without complication, without long-term current use of insulin ([facility])     ZAINAB (obstructive sleep apnea)     Tobacco abuse 05/21/2017    CVA (cerebral vascular accident) ([address] 05/21/2017    Angina, class IV ([address]     Dyspnea     Gastroparesis due to DM ([address]     Biliary colic 09/56/6343    Symptomatic cholelithiasis 01/04/2017    Degeneration of lumbar or lumbosacral intervertebral disc 03/18/2016    Lumbar radiculopathy 03/18/2016    Lumbosacral spondylosis without myelopathy 03/18/2016    ZAINAB on CPAP 02/11/2016    Obesity (BMI 30-39. 9)     PNA (pneumonia) 03/28/2015    Depression with anxiety 06/05/2014    Passive smoke exposure 05/30/2014    Diabetic neuropathy ([address] 11/14/2013    Claudication in peripheral vascular disease ([address] 06/26/2013    Bilateral hilar adenopathy syndrome 06/03/2013    Sepsis without acute organ dysfunction ([address] 12/25/2012       ASSESSMENT AND PLAN     #ESRD:On dialysis TTS at [facility]  Prior target weight of 102 kg but after recent hospitalization he was down to 99 kg ( outpatient )  -Has working left [facility] [employer] [facility] [employer]. Prior fistula with steal syndrome and severe needle phobia s/p fistula ligation   -HD today then again tomorrow per MWF schedule    #Shortness of breath:multifactorial  Recent pneumonia.     Recurrent L pleural effusion  -pulmonary following; agree that he may need pleurodesis, or pleurX  -po fluid restriction , decrease DW at HD as tolerated     #Anemia of chronic disease:  Getting DAVON at dialysis    #Hypertension:    Range improving

## 2022-12-13 NOTE — CARE COORDINATION
EGS pre cert  yesterday. Pt s/p thoracentesis yesterday. Dispo pending improvement in resp. Status. Melissa Ellis CM  to restart cert. Will follow for approval and possible dc today. Addendum 1424pm: DC order noted. Pre cert still pending at this time.

## 2022-12-13 NOTE — PROGRESS NOTES
12/13/22 0343   NIV Type   NIV Started/Stopped On   Equipment Type v60   Mode Bilevel   Mask Type Full face mask   Settings/Measurements   PIP Observed 18 cm H20   IPAP 16 cmH20   CPAP/EPAP 8 cmH2O   Vt (Measured) 597 mL   Rate Ordered 16   Resp 18   Insp Rise Time (%) 1 %   FiO2  30 %   I Time/ I Time % 1.05 s   Minute Volume (L/min) 10.6 Liters   Comfort Level Good   Using Accessory Muscles No   SpO2 100   Patient's Home Machine No   Alarm Settings   Alarms On Y   Low Pressure (cmH2O) 6 cmH2O   High Pressure (cmH2O) 30 cmH2O   Delay Alarm 20 sec(s)   RR Low (bpm) 6   RR High (bpm) 40 br/min

## 2022-12-13 NOTE — PROGRESS NOTES
12/12/22 8057   NIV Type   NIV Started/Stopped On   Equipment Type v60   Mode Bilevel   Mask Type Full face mask   Settings/Measurements   PIP Observed 17 cm H20   IPAP 16 cmH20   CPAP/EPAP 8 cmH2O   Rate Ordered 16   Resp 17   Insp Rise Time (%) 1 %   FiO2  30 %   I Time/ I Time % 1.05 s   Minute Volume (L/min) 11 Liters   Comfort Level Good   Using Accessory Muscles No   SpO2 99   Patient's Home Machine No   Alarm Settings   Alarms On Y   Low Pressure (cmH2O) 6 cmH2O   High Pressure (cmH2O) 30 cmH2O   Delay Alarm 20 sec(s)   RR Low (bpm) 6   RR High (bpm) 40 br/min   Oxygen Therapy/Pulse Ox   O2 Device PAP (positive airway pressure)   Heart Rate 56   SpO2 97 %   Pulse Oximeter Device Mode Continuous   Pulse Oximeter Device Location Finger

## 2022-12-13 NOTE — DISCHARGE INSTR - COC
Continuity of Care Form    Patient Name: Lawana Collet   :  1970  MRN:  [id number]    Admit date:  2022  Discharge date:  ***    Code Status Order: Full Code   Advance Directives:     Admitting Physician:  No admitting provider for patient encounter. PCP: Pcp No    Discharging Nurse: [facility]/Room#: 1332/5809-36  Discharging Unit Phone Number: ***    Emergency Contact:   Extended Emergency Contact Information  Primary Emergency Contact: AnnCrystal  Address: [address], [address] [facility],[address] Phone: [phone]  Mobile Phone: [phone]  Relation: Spouse  Secondary Emergency Contact: [facility] Phone: [phone]  Relation: Brother/Sister  Preferred language: English   needed?  No    Past Surgical History:  Past Surgical History:   Procedure Laterality Date    AORTIC VALVE REPLACEMENT N/A 10/15/2019    TRANSCATHETER AORTIC VALVE REPLACEMENT FEMORAL APPROACH performed by Enriqueta Perez MD at [facility] Right 2019    PERITONEAL DIALYSIS CATHETER REMOVAL performed by Bailee Howell MD at [place] [address]      WN    CORONARY ANGIOPLASTY WITH STENT PLACEMENT  05/26/15    CYST REMOVAL  2013    EXCISION CYSTS, NECK X2 AND ABDOMINAL benign    DIAGNOSTIC CARDIAC CATH LAB PROCEDURE      DIALYSIS FISTULA CREATION Left 10/30/2017    LEFT BRACHIAL CEPHALIC FISTULA    DIALYSIS FISTULA CREATION Left 3/27/2019    LIGATION  AV FISTULA performed by Christopher Mitchell MD at [address] [place] [address], [facility]      IR TUNNELED CATHETER PLACEMENT GREATER THAN 5 YEARS  3/21/2022    IR TUNNELED CATHETER PLACEMENT GREATER THAN 5 YEARS 3/21/2022 [facility]    IR TUNNELED CATHETER PLACEMENT GREATER THAN 5 YEARS  2022    IR TUNNELED CATHETER PLACEMENT GREATER THAN 5 YEARS 2022 [facility]    IR TUNNELED CATHETER PLACEMENT GREATER THAN 5 YEARS  4/26/2022    IR TUNNELED CATHETER PLACEMENT GREATER THAN 5 YEARS 4/26/2022 [facility]    IR TUNNELED CATHETER PLACEMENT GREATER THAN 5 YEARS  6/23/2022    IR TUNNELED CATHETER PLACEMENT GREATER THAN 5 YEARS 6/23/2022 [facility]    OTHER SURGICAL HISTORY  02/01/2017    laparoscopic cholecystectomy with intraoperative cholangiogram    OTHER SURGICAL HISTORY  2018    PORT PLACEMENT  - vas cath    OTHER SURGICAL HISTORY Bilateral 06/26/2018    laprascopic peritoneal dialysis catheter placement    OTHER SURGICAL HISTORY Right 09/2018    peritoneal dialysis port placed on right side of abdomen    OTHER SURGICAL HISTORY  05/28/2019    PTA/Stenting R External Iliac artery    TX LAP INSERTION TUNNELED INTRAPERITONEAL CATHETER N/A 9/21/2018    LAPAROSCOPIC PERITONEAL DIALYSIS CATHETER REPLACEMENT performed by Sayra Lux MD at [address] [place] [address] 2/24/2022    PERINEAL ABCESS DRAINAGE performed by Sayra Lux MD at [address] [place] [address] N/A 10/2/2022    THORACENTESIS ULTRASOUND performed by Jakub Hernández MD at [address]  01/06/2016    UPPER GASTROINTESTINAL ENDOSCOPY  01/29/2017    possible candida, otherwise normal appearing    VASCULAR SURGERY  aprx 2 years ago    2 stents placed, each side of groin       Immunization History:   Immunization History   Administered Date(s) Administered    Hepatitis B Adult (Engerix-B) 11/18/2017, 06/13/2018, 07/13/2018    INFLUENZA, INTRADERMAL, QUADRIVALENT, PRESERVATIVE FREE 11/16/2016    Influenza Virus Vaccine 12/27/2012, 10/07/2014, 11/20/2015, 10/16/2019    Influenza, FLUARIX, FLULAVAL, FLUZONE (age 10 mo+) AND AFLURIA, (age 1 y+), PF, 0.5mL 10/16/2019    Influenza, FLUCELVAX, (age 10 mo+), MDCK, PF, 0.5mL 10/14/2017, 09/30/2020, 10/05/2021    Influenza, Quadv, 6 mo and older, IM, PF (Flulaval, Fluarix) 09/15/2018 PPD Test 11/09/2017, 11/16/2017, 01/03/2019, 01/06/2020, 01/15/2021    Pneumococcal Conjugate 13-valent ([id number]) 10/14/2017    Pneumococcal Polysaccharide (Kbztuzcjp50) 10/07/2014, 11/19/2019    Tdap (Boostrix, Adacel) 02/13/2020    Zoster Recombinant (Shingrix) 02/13/2020       Active Problems:  Patient Active Problem List   Diagnosis Code    Sepsis without acute organ dysfunction ([facility] A41.9    CHF (congestive heart failure) ([facility]) I50.9    Asthma-COPD overlap syndrome ([facility]) J44.9    CAD (coronary artery disease) I25.10    PVD (peripheral vascular disease) ([facility] I73.9    Bicuspid aortic valve Q23.1    Bilateral hilar adenopathy syndrome R59.0    Claudication in peripheral vascular disease ([facility]) I73.9    Uncontrolled hypertension I10    Diabetic neuropathy ([facility]) E11.40    DM2 (diabetes mellitus, type 2) ([facility]) E11.9    Passive smoke exposure Z77.22    Depression with anxiety F41.8    PNA (pneumonia) J18.9    Obesity (BMI 30-39. 9) E66.9    ZAINAB on CPAP G47.33, Z99.89    Degeneration of lumbar or lumbosacral intervertebral disc M51.37    Lumbar radiculopathy M54.16    Lumbosacral spondylosis without myelopathy X17.606    Biliary colic G66.11    Symptomatic cholelithiasis K80.20    Gastroparesis due to DM ([facility]) E11.43, K31.84    Angina, class IV ([facility]) I20.9    Dyspnea R06.00    Dyslipidemia E78.5    Acute on chronic combined systolic and diastolic heart failure ([facility]) I50.43    Ischemic cardiomyopathy I25.5    Tobacco abuse Z72.0    CVA (cerebral vascular accident) ([facility] I63.9    History of CVA (cerebrovascular accident) Z86.73    Type 2 diabetes mellitus without complication, without long-term current use of insulin ([facility]) E11.9    ZAINAB (obstructive sleep apnea) G47.33    Pleural effusion on left J90    Chronic anemia D64.9    Nonrheumatic aortic valve stenosis I35.0    Mucus plugging of bronchi T17.500A    Hemodialysis-associated hypotension I95.3    ESRD (end stage renal disease) on dialysis ([facility]) N18.6, Z99.2 Hypotension due to drugs O47.1    Acute diastolic CHF (congestive heart failure) ([facility]) I50.31    Neuromuscular disorder ([facility]) G70.9    Renovascular hypertension I15.0    Mixed hyperlipidemia E78.2    Cigarette nicotine dependence in remission F17.211    Pulmonary edema J81.1    Fluid overload E87.70    Anemia of chronic disease D63.8    SOB (shortness of breath) on exertion R06.02    Steal syndrome of dialysis vascular access [facility]) T82.898A    Chronic, continuous use of opioids F11.90    Chronic bronchitis ([facility]) J42    Nasal congestion R09.81    Hypercholesteremia E78.00    Bradycardia R00.1    History of transcatheter aortic valve replacement (TAVR) Z95.2    Syncope and collapse R55    PAF (paroxysmal atrial fibrillation) ([facility]) I48.0    Bilateral leg weakness R29.898    GBS (Guillain-[place] syndrome) ([facility]) G61.0    Sinus pause I45.5    Weakness of both lower extremities R29.898    Sinus bradycardia R00.1    Ataxia R27.0    Peripheral vascular occlusive disease ([facility]) I73.9    Cellulitis of right foot L03.115    Iliac artery occlusion, right ([facility]) I74.5    Cellulitis and abscess of hand L03.119, L02.519    Type 2 diabetes mellitus with hyperglycemia ([facility]) E11.65    Acute encephalopathy G93.40    Acute hypoxemic respiratory failure ([facility]) J96.01    Acute CVA (cerebrovascular accident) ([address] I63.9    Speech problem R47.9    Urinary tract infection with hematuria N39.0, R31.9    Respiratory failure with hypoxia ([facility] J96.91    Acute respiratory failure with hypercapnia ([facility]) J96.02    Acute pulmonary edema ([facility]) J81.0    Grade II diastolic dysfunction U50.38    Shock circulatory ([facility]) R57.9    Smoker F17.200    Normocytic normochromic anemia D64.9    NSTEMI (non-ST elevated myocardial infarction) ([facility]) I21.4    SIRS (systemic inflammatory response syndrome) ([facility]) R65.10    Atrial flutter ([facility]) I48.92    [place]-rectal abscess K61.1    Somnolence R40.0    Pulmonary edema with diastolic CHF, NYHA class 3 ([facility]) I50.30 Respiratory failure ([facility] J96.90    Former smoker Z87.891    Cardiomyopathy ([facility] I42.9    Morbid obesity with BMI of 40.0-44.9, adult ([facility] E66.01, Z68.41    Hypoglycemia E16.2    Altered mental status R41.82    Hypertensive emergency I16.1    SOB (shortness of breath) R06.02    Acute respiratory failure with hypoxia and hypercapnia ([facility]) J96.01, J96.02    HFrEF (heart failure with reduced ejection fraction) ([facility]) I50.20    Pericardial effusion I31.39    Hypervolemia E87.70    Acute pneumonia J18.9    Acute on chronic respiratory failure ([facility]) J96.20    Compression atelectasis J98.11    Type 2 myocardial infarction ([facility] I21. A1       Isolation/Infection:   Isolation            No Isolation          Patient Infection Status       Infection Onset Added Last Indicated Last Indicated By Review Planned Expiration Resolved Resolved By    None active    Resolved    COVID-19 (Rule Out) 12/04/22 12/04/22 12/04/22 COVID-19 & Influenza Combo (Ordered)   12/04/22 Rule-Out Test Resulted    COVID-19 (Rule Out) 12/03/22 12/03/22 12/03/22 COVID-19 & Influenza Combo (Ordered)   12/03/22 Rule-Out Test Resulted    C-diff Rule Out 12/02/22 12/02/22 12/02/22 Clostridium difficile toxin/antigen (Ordered)   12/03/22 Rule-Out Test Canceled    COVID-19 (Rule Out) 11/25/22 11/25/22 11/25/22 COVID-19 & Influenza Combo (Ordered)   11/25/22 Rule-Out Test Resulted    COVID-19 (Rule Out) 10/18/22 10/18/22 10/18/22 COVID-19, Rapid (Ordered)   10/18/22 Rule-Out Test Resulted    COVID-19 (Rule Out) 09/23/22 09/23/22 09/23/22 COVID-19, Rapid (Ordered)   09/23/22 Rule-Out Test Resulted    COVID-19 (Rule Out) 08/02/22 08/02/22 08/02/22 COVID-19, Rapid (Ordered)   08/02/22 Rule-Out Test Resulted    COVID-19 (Rule Out) 07/05/22 07/05/22 07/06/22 SARS-CoV-2 NAAT (Rapid) (Ordered)   07/06/22 Rule-Out Test Resulted    COVID-19 (Rule Out) 05/29/22 05/29/22 05/29/22 COVID-19, Rapid (Ordered)   05/29/22 Rule-Out Test Resulted    COVID-19 (Rule Out) 04/18/22 04/18/22 04/18/22 COVID-19, Rapid (Ordered)   04/18/22 Rule-Out Test Resulted    COVID-19 (Rule Out) 02/25/22 02/25/22 02/25/22 COVID-19, Rapid (Ordered)   02/25/22 Rule-Out Test Resulted    COVID-19 (Rule Out) 01/12/22 01/12/22 01/12/22 COVID-19, Rapid (Ordered)   01/12/22 Rule-Out Test Resulted    COVID-19 (Rule Out) 11/29/21 11/29/21 11/29/21 COVID-19, Rapid (Ordered)   11/30/21 Rule-Out Test Resulted    COVID-19 (Rule Out) 08/29/21 08/29/21 08/29/21 COVID-19 (Ordered)   08/29/21 Rule-Out Test Resulted    COVID-19 (Rule Out) 12/18/20 12/18/20 12/18/20 COVID-19 (Ordered)   12/18/20 Rule-Out Test Resulted    COVID-19 (Rule Out) 05/04/20 05/04/20 05/04/20 COVID-19 (Ordered)   05/04/20 Rule-Out Test Resulted            Nurse Assessment:  Last Vital Signs: BP (!) 156/70   Pulse 55   Temp 97.6 °F (36.4 °C)   Resp 18   Ht 5' 9\" (1.753 m)   Wt 249 lb 9 oz (113.2 kg)   SpO2 100%   BMI 36.85 kg/m²     Last documented pain score (0-10 scale): Pain Level: 8  Last Weight:   Wt Readings from Last 1 Encounters:   12/13/22 249 lb 9 oz (113.2 kg)     Mental Status:  {IP PT MENTAL STATUS:20030}    IV Access:  { CELINE IV ACCESS:102616769}    Nursing Mobility/ADLs:  Walking   {CHP DME IJRC:025225453}  Transfer  {CHP DME BMSA:069199454}  Bathing  {CHP DME ZLMD:112780684}  Dressing  {CHP DME EGVS:464216493}  Toileting  {CHP DME CJPS:604301055}  Feeding  {CHP DME VLOK:667010812}  Med Admin  {CHP DME SLOF:657476675}  Med Delivery   { CELINE MED Delivery:927552646}    Wound Care Documentation and Therapy:  Wound 08/30/21 Toe (Comment  which one) Right Great Toe (Active)   Number of days: 469       Wound 01/12/22 Pedal Anterior;Right Healing scab from previous blister (per pt), flaky edges (Active)   Number of days: 335       Wound 01/12/22 Toe (Comment  which one) Anterior;Right Scab from old blister (per pt) on 4th toe, flaky edges (Active)   Number of days: 335       Incision 02/24/22 Perineum (Active)   Number of days: 973 Elimination:  Continence: Bowel: {YES / PS:94084}  Bladder: {YES / TH:39645}  Urinary Catheter: {Urinary Catheter:132286597}   Colostomy/Ileostomy/Ileal Conduit: {YES / YR:27852}       Date of Last BM: ***    Intake/Output Summary (Last 24 hours) at 2022 1323  Last data filed at 2022 0857  Gross per 24 hour   Intake 10 ml   Output --   Net 10 ml     I/O last 3 completed shifts:   In: 0 [P.O.:240]  Out: 2742     Safety Concerns:     [employer] Safety Concerns:614084792}    Impairments/Disabilities:      [employer] Impairments/Disabilities:180585173}    Nutrition Therapy:  Current Nutrition Therapy:   [employer] Diet List:592410205}    Routes of Feeding: {CHP DME Other Feedings:652211855}  Liquids: {Slp liquid thickness:93249}  Daily Fluid Restriction: {CHP DME Yes amt example:141030025}  Last Modified Barium Swallow with Video (Video Swallowing Test): {Done Not Done ALLR:198369638}    Treatments at the Time of Hospital Discharge:   Respiratory Treatments: ***  Oxygen Therapy:  {Therapy; copd oxygen:30733}  Ventilator:    { CC Vent FYKP:867200749}    Rehab Therapies: {THERAPEUTIC INTERVENTION:0508966087}  Weight Bearing Status/Restrictions: [address]  Weight Bearin}  Other Medical Equipment (for information only, NOT a DME order):  {EQUIPMENT:575819152}  Other Treatments: ***    Patient's personal belongings (please select all that are sent with patient):  {CHP DME Belongings:068102138}    RN SIGNATURE:  {Esignature:608177922}    CASE MANAGEMENT/SOCIAL WORK SECTION    Inpatient Status Date: 22    Readmission Risk Assessment Score:  Readmission Risk              Risk of Unplanned Readmission:  80           Discharging to Facility/ Agency   Name: Joseph Lagos  Phone: 21 [phone]    Dialysis Facility (if applicable)   [facility]  Dialysis Schedule:  [facility]    / signature: Electronically signed by Genoveva Fuller RN on 22 at 2:25 PM EST    PHYSICIAN SECTION    Prognosis: Good    Condition at Discharge: Stable    Rehab Potential (if transferring to Rehab): Good    Recommended Labs or Other Treatments After Discharge: F/u with CT surg outpatient for consideration for pluer-x. PCP in 1 week    Physician Certification: I certify the above information and transfer of Sallie Lugo  is necessary for the continuing treatment of the diagnosis listed and that he requires [place] for less 30 days.      Update Admission H&P: No change in H&P    PHYSICIAN SIGNATURE:  Electronically signed by Ilsa Naranjo DO on 12/13/22 at 1:23 PM EST

## 2022-12-13 NOTE — PROGRESS NOTES
Occupational Therapy/Physical Therapy  OT/PT follow up attempted, pt off floor for dialysis. Will continue to follow per POC.   JOSSIE Birch/АННА

## 2022-12-13 NOTE — PLAN OF CARE
Patient's EF (Ejection Fraction) is less than 40%    Heart Failure Medications:  Diuretics[de-identified] None    (One of the following REQUIRED for EF </= 40%/SYSTOLIC FAILURE but MAY be used in EF% >40%/DIASTOLIC FAILURE)        ACE[de-identified] None        ARB[de-identified] Losartan         ARNI[de-identified] None    (Beta Blockers)  NON- Evidenced Based Beta Blocker (for EF% >40%/DIASTOLIC FAILURE): Metoprolol TARTrate- Lopressor    Evidenced Based Beta Blocker::(REQUIRED for EF% <40%/SYSTOLIC FAILURE) None  . .................................................................................................................................................. Patient's weights and intake/output reviewed: Yes    Patient's Last Weight: 249 lbs obtained by bed scale. Difference of 8 lbs more than last documented weight.       Intake/Output Summary (Last 24 hours) at 12/13/2022 1444  Last data filed at 12/13/2022 1305  Gross per 24 hour   Intake 610 ml   Output --   Net 610 ml       Education Booklet Provided: yes    Comorbidities Reviewed Yes    Patient has a past medical history of Ambulatory dysfunction, Aortic stenosis, Arthritis, Asthma, Bilateral hilar adenopathy syndrome, CAD (coronary artery disease), Cardiomyopathy ([address] CHF (congestive heart failure) ([address] Chronic pain, COPD (chronic obstructive pulmonary disease) ([address] Depression, Diabetes mellitus ([address] Difficult intravenous access, Emphysema of lung ([address] ESRD (end stage renal disease) on dialysis ([address] Fear of needles, Gastric ulcer, GERD (gastroesophageal reflux disease), Heart valve problem, Hemodialysis patient ([address] History of spinal fracture, Hx of blood clots, Hyperlipidemia, Hypertension, MI (myocardial infarction) ([address] Neuromuscular disorder ([address] Numbness and tingling in left arm, Pneumonia, PONV (postoperative nausea and vomiting), Prolonged emergence from general anesthesia, Sleep apnea, Stroke ([address] TIA (transient ischemic attack), and Unspecified diseases of blood and blood-forming organs. >>For CHF and Comorbidity documentation on Education Time and Topics, please see Education Tab    Progressive Mobility Assessment:  What is this patient's Current Level of Mobility?: Ambulatory- with Assistance  How was this patient Mobilized today?: Edge of Bed, Up to Chair, Bedside Commode, and Up in Room, ambulated 0 ft                 With Whom? Nurse and PCA                 Level of Difficulty/Assistance: 1x Assist     Pt resting in bed at this time on  6 L O2. Pt with complaints of shortness of breath. Pt without lower extremity edema.      Patient and/or Family's stated Goal of Care this Admission: reduce shortness of breath, increase activity tolerance, better understand heart failure and disease management, be more comfortable, and reduce lower extremity edema prior to discharge        :

## 2022-12-13 NOTE — PROGRESS NOTES
12/12/22 2151   NIV Type   NIV Started/Stopped On   Equipment Type v60   Mode Bilevel   Mask Type Full face mask   Settings/Measurements   PIP Observed 17 cm H20   IPAP 16 cmH20   CPAP/EPAP 8 cmH2O   Rate Ordered 16   Resp 17   Insp Rise Time (%) 1 %   FiO2  30 %   I Time/ I Time % 1.05 s   Minute Volume (L/min) 9.8 Liters   Mask Leak (lpm) 20 lpm   Comfort Level Good   Using Accessory Muscles No   Patient's Home Machine No   Alarm Settings   Alarms On Y   Low Pressure (cmH2O) 6 cmH2O   High Pressure (cmH2O) 30 cmH2O   Delay Alarm 20 sec(s)   RR Low (bpm) 6   RR High (bpm) 40 br/min

## 2022-12-13 NOTE — PROGRESS NOTES
Treatment time: 3 hours  Net UF: 3000 ml     Pre weight: 113.2 kg  Post weight:110.1 kg  EDW: 97 kg    Access used: LTDC    Access function: well with  ml/min     Medications or blood products given: retacrit 36647 units, heparin dwells     Regular outpatient schedule: MWF     Summary of response to treatment: Patient tolerated treatment well and without any complications. Patient remained stable throughout entire treatment        12/13/22 1305 12/13/22 1605   Treatment   Time On 1305  --    Time Off  --  1605   Treatment Goal 3000  --    Vital Signs   BP (!) 156/70 (!) 165/83   Temp 97.6 °F (36.4 °C) 98 °F (36.7 °C)   Heart Rate 55 60   Resp 18 18   Weight 249 lb 9 oz (113.2 kg) 242 lb 11.6 oz (110.1 kg)   Technical Checks   Machine Alarm Self Test Completed; Passed  --    Dialysis Bath   K+ (Potassium) 2  --    Ca+ (Calcium) 2.5  --    Na+ (Sodium) 138  --    HCO3 (Bicarb) 32  --

## 2022-12-14 VITALS
DIASTOLIC BLOOD PRESSURE: 82 MMHG | TEMPERATURE: 98.2 F | RESPIRATION RATE: 20 BRPM | BODY MASS INDEX: 32.69 KG/M2 | WEIGHT: 220.68 LBS | SYSTOLIC BLOOD PRESSURE: 155 MMHG | HEIGHT: 69 IN | HEART RATE: 68 BPM | OXYGEN SATURATION: 98 %

## 2022-12-14 LAB
ALBUMIN SERPL-MCNC: 3.7 G/DL (ref 3.4–5)
ANION GAP SERPL CALCULATED.3IONS-SCNC: 18 MMOL/L (ref 3–16)
BANDED NEUTROPHILS RELATIVE PERCENT: 1 % (ref 0–7)
BASOPHILS ABSOLUTE: 0 K/UL (ref 0–0.2)
BASOPHILS RELATIVE PERCENT: 0 %
BUN BLDV-MCNC: 67 MG/DL (ref 7–20)
CALCIUM SERPL-MCNC: 8.7 MG/DL (ref 8.3–10.6)
CHLORIDE BLD-SCNC: 93 MMOL/L (ref 99–110)
CO2: 21 MMOL/L (ref 21–32)
CREAT SERPL-MCNC: 7.2 MG/DL (ref 0.9–1.3)
EOSINOPHILS ABSOLUTE: 0.4 K/UL (ref 0–0.6)
EOSINOPHILS RELATIVE PERCENT: 5 %
GFR SERPL CREATININE-BSD FRML MDRD: 8 ML/MIN/{1.73_M2}
GLUCOSE BLD-MCNC: 131 MG/DL (ref 70–99)
GLUCOSE BLD-MCNC: 154 MG/DL (ref 70–99)
GLUCOSE BLD-MCNC: 231 MG/DL (ref 70–99)
HCT VFR BLD CALC: 26.8 % (ref 40.5–52.5)
HEMOGLOBIN: 8.7 G/DL (ref 13.5–17.5)
LYMPHOCYTES ABSOLUTE: 1.4 K/UL (ref 1–5.1)
LYMPHOCYTES RELATIVE PERCENT: 16 %
MCH RBC QN AUTO: 29.2 PG (ref 26–34)
MCHC RBC AUTO-ENTMCNC: 32.6 G/DL (ref 31–36)
MCV RBC AUTO: 89.6 FL (ref 80–100)
MONOCYTES ABSOLUTE: 0.8 K/UL (ref 0–1.3)
MONOCYTES RELATIVE PERCENT: 9 %
NEUTROPHILS ABSOLUTE: 6 K/UL (ref 1.7–7.7)
NEUTROPHILS RELATIVE PERCENT: 69 %
PDW BLD-RTO: 16.7 % (ref 12.4–15.4)
PERFORMED ON: ABNORMAL
PERFORMED ON: ABNORMAL
PHOSPHORUS: 5.2 MG/DL (ref 2.5–4.9)
PLATELET # BLD: 296 K/UL (ref 135–450)
PLATELET SLIDE REVIEW: ADEQUATE
PMV BLD AUTO: 7.8 FL (ref 5–10.5)
POTASSIUM SERPL-SCNC: 5.9 MMOL/L (ref 3.5–5.1)
RBC # BLD: 2.99 M/UL (ref 4.2–5.9)
SLIDE REVIEW: ABNORMAL
SODIUM BLD-SCNC: 132 MMOL/L (ref 136–145)
WBC # BLD: 8.5 K/UL (ref 4–11)

## 2022-12-14 PROCEDURE — 6370000000 HC RX 637 (ALT 250 FOR IP): Performed by: INTERNAL MEDICINE

## 2022-12-14 PROCEDURE — 80069 RENAL FUNCTION PANEL: CPT

## 2022-12-14 PROCEDURE — 36415 COLL VENOUS BLD VENIPUNCTURE: CPT

## 2022-12-14 PROCEDURE — 6370000000 HC RX 637 (ALT 250 FOR IP): Performed by: NURSE PRACTITIONER

## 2022-12-14 PROCEDURE — 2580000003 HC RX 258: Performed by: INTERNAL MEDICINE

## 2022-12-14 PROCEDURE — 2700000000 HC OXYGEN THERAPY PER DAY

## 2022-12-14 PROCEDURE — 85025 COMPLETE CBC W/AUTO DIFF WBC: CPT

## 2022-12-14 PROCEDURE — 2500000003 HC RX 250 WO HCPCS: Performed by: INTERNAL MEDICINE

## 2022-12-14 PROCEDURE — 90935 HEMODIALYSIS ONE EVALUATION: CPT

## 2022-12-14 PROCEDURE — 94761 N-INVAS EAR/PLS OXIMETRY MLT: CPT

## 2022-12-14 RX ORDER — HEPARIN SODIUM 1000 [USP'U]/ML
INJECTION, SOLUTION INTRAVENOUS; SUBCUTANEOUS
Status: DISCONTINUED
Start: 2022-12-14 | End: 2022-12-14 | Stop reason: HOSPADM

## 2022-12-14 RX ADMIN — ISOSORBIDE DINITRATE 20 MG: 20 TABLET ORAL at 13:18

## 2022-12-14 RX ADMIN — ISOSORBIDE DINITRATE 20 MG: 20 TABLET ORAL at 07:53

## 2022-12-14 RX ADMIN — METOPROLOL SUCCINATE 100 MG: 50 TABLET, EXTENDED RELEASE ORAL at 07:53

## 2022-12-14 RX ADMIN — NEPHROCAP 1 MG: 1 CAP ORAL at 07:53

## 2022-12-14 RX ADMIN — PRAVASTATIN SODIUM 40 MG: 40 TABLET ORAL at 07:55

## 2022-12-14 RX ADMIN — CALCIUM ACETATE 667 MG: 667 CAPSULE ORAL at 07:54

## 2022-12-14 RX ADMIN — OXYCODONE AND ACETAMINOPHEN 1 TABLET: 7.5; 325 TABLET ORAL at 07:53

## 2022-12-14 RX ADMIN — ASPIRIN 81 MG: 81 TABLET, CHEWABLE ORAL at 07:54

## 2022-12-14 RX ADMIN — PANTOPRAZOLE SODIUM 40 MG: 40 TABLET, DELAYED RELEASE ORAL at 05:41

## 2022-12-14 RX ADMIN — SODIUM CHLORIDE, PRESERVATIVE FREE 10 ML: 5 INJECTION INTRAVENOUS at 07:54

## 2022-12-14 RX ADMIN — DULOXETINE HYDROCHLORIDE 30 MG: 30 CAPSULE, DELAYED RELEASE ORAL at 07:54

## 2022-12-14 RX ADMIN — CALCIUM ACETATE 667 MG: 667 CAPSULE ORAL at 13:18

## 2022-12-14 ASSESSMENT — PAIN DESCRIPTION - LOCATION
LOCATION: BACK

## 2022-12-14 ASSESSMENT — PAIN SCALES - GENERAL
PAINLEVEL_OUTOF10: 8
PAINLEVEL_OUTOF10: 9
PAINLEVEL_OUTOF10: 0
PAINLEVEL_OUTOF10: 9

## 2022-12-14 NOTE — PROGRESS NOTES
Patient discharge completed. IV removed. Discharge information included information on diagnosis including signs and symptoms, complications and when to seek medical attention. Information on new medications also provided included use for the medication, side effects and when to call the doctor. Patient verbalized understanding of all discharge information. Patient escorted out by staff with all documented belongings. Home with family.

## 2022-12-14 NOTE — PROGRESS NOTES
Treatment time: 3 hours  Net UF: 3000 ml     Pre weight: 103.0 kg  Post weight:100.1 kg  EDW: 99.0 kg    Access used: RTC    Access function: well with  ml/min     Medications or blood products given: heparin dwells     Regular outpatient schedule: MWF     Summary of response to treatment: Patient tolerated treatment well and without any complications. Patient remained stable throughout entire treatment        12/14/22 1000 12/14/22 1308   Treatment   Time On 1007  --    Time Off  --  1307   Treatment Goal 3400  --    Vital Signs   /76 (!) 124/59   Temp 97.9 °F (36.6 °C) 98 °F (36.7 °C)   Heart Rate 72 61   Resp 20 20   Weight 227 lb 1.2 oz (103 kg) 220 lb 10.9 oz (100.1 kg)   Technical Checks   Machine Alarm Self Test Completed; Passed  --    Dialysis Bath   K+ (Potassium) 2  --    Ca+ (Calcium) 2.5  --    Na+ (Sodium) 138  --    HCO3 (Bicarb) 32  --

## 2022-12-14 NOTE — PROGRESS NOTES
CC:ESRD  HPI     44 y/o male with history of ESRD admitted with acute onset of shortness of breath. He was recently admitted with pneumonia. He says since discharge he has been having fevers and night sweats. He has been compliant with dialysis     SUBJECTIVE  The patient was seen and examined; he feels well today with no CP, SOB, nausea or vomiting. ROS: No fever or chills. Social: No family at bedside.     Scheduled Meds:   heparin (porcine)        b complex-C-folic acid  1 capsule Oral Daily    aspirin  81 mg Oral Daily    epoetin siddharth-epbx  10,000 Units IntraVENous Once per day on Mon Wed Fri    [Held by provider] apixaban  2.5 mg Oral BID    calcium acetate  1 capsule Oral TID WC    [Held by provider] clopidogrel  75 mg Oral Daily    DULoxetine  30 mg Oral Daily    isosorbide dinitrate  20 mg Oral TID    metoprolol succinate  100 mg Oral BID    pantoprazole  40 mg Oral QAM AC    pravastatin  40 mg Oral Daily    QUEtiapine  50 mg Oral Nightly    sodium chloride flush  5-40 mL IntraVENous 2 times per day    insulin lispro  0-4 Units SubCUTAneous TID WC    insulin lispro  0-4 Units SubCUTAneous Nightly     Continuous Infusions:   sodium chloride      dextrose       PRN Meds:heparin (porcine), perflutren lipid microspheres, calcium carbonate, gabapentin, sodium chloride flush, sodium chloride, ondansetron **OR** ondansetron, polyethylene glycol, acetaminophen **OR** acetaminophen, glucose, dextrose bolus **OR** dextrose bolus, glucagon (rDNA), dextrose, oxyCODONE-acetaminophen, labetalol, ipratropium-albuterol      Objective:      Physical Exam     Wt Readings from Last 3 Encounters:   12/14/22 220 lb 10.9 oz (100.1 kg)   12/02/22 220 lb (99.8 kg)   11/28/22 253 lb 8.5 oz (115 kg)     Temp Readings from Last 3 Encounters:   12/14/22 98.2 °F (36.8 °C) (Oral)   12/03/22 97 °F (36.1 °C)   11/28/22 98.1 °F (36.7 °C)     BP Readings from Last 3 Encounters:   12/14/22 (!) 155/82   12/03/22 (!) 156/75 11/28/22 132/78     Pulse Readings from Last 3 Encounters:   12/14/22 68   12/03/22 91   11/28/22 63     General:  NAD, A+Ox3, ill-appearing, normal body habitus  HEENT:  PERRL, EOMI  Neck:  Supple, normal range of movement  Chest: On bilevel, no wheezing   CV:  Tachycardic, no rub   Abdomen:  NTND, soft, +BS, no hepatosplenomegaly  Extremities:  No peripheral edema  Neurological:  Moving all four extremities, CN II-XII grossly intact  Lymphatics:  No palpable lymph nodes  Skin:  No rash, no jaundice  Psychiatric:  Alert, flat affect  Access:  Left [facility]     Lab Review   Lab Results   Component Value Date    WBC 8.5 12/14/2022    HGB 8.7 (L) 12/14/2022    HCT 26.8 (L) 12/14/2022    MCV 89.6 12/14/2022     12/14/2022     Lab Results   Component Value Date/Time     12/14/2022 05:36 AM    K 5.9 12/14/2022 05:36 AM    K 3.8 12/02/2022 11:54 PM    CL 93 12/14/2022 05:36 AM    CO2 21 12/14/2022 05:36 AM    BUN 67 12/14/2022 05:36 AM    CREATININE 7.2 12/14/2022 05:36 AM    GLUCOSE 154 12/14/2022 05:36 AM    CALCIUM 8.7 12/14/2022 05:36 AM            Patient Active Problem List    Diagnosis Date Noted    Hypotension due to drugs 11/25/2017    Acute on chronic combined systolic and diastolic heart failure (HCC)     Ischemic cardiomyopathy     Dyslipidemia     DM2 (diabetes mellitus, type 2) ([address] 03/04/2014    Bicuspid aortic valve 06/03/2013    CHF (congestive heart failure) ([address] 05/14/2013    CAD (coronary artery disease) 05/14/2013    PVD (peripheral vascular disease) ([address] 05/14/2013    Type 2 myocardial infarction ([address] 12/08/2022    Compression atelectasis 12/06/2022    Acute on chronic respiratory failure ([address] 12/04/2022    Acute pneumonia 11/25/2022    Hypervolemia 10/19/2022    Pericardial effusion 09/28/2022    HFrEF (heart failure with reduced ejection fraction) ([facility] 09/24/2022    Acute respiratory failure with hypoxia and hypercapnia [facility] 09/23/2022    Hypertensive emergency 07/17/2022    SOB (shortness of breath) 07/17/2022    Altered mental status     Hypoglycemia 05/29/2022    Morbid obesity with BMI of 40.0-44.9, adult ([address] 04/26/2022    Former smoker 04/19/2022    Cardiomyopathy ([address] 04/19/2022    History of transcatheter aortic valve replacement (TAVR) 10/19/2019    Uncontrolled hypertension 11/14/2013    Asthma-COPD overlap syndrome ([address] 05/14/2013    Respiratory failure ([address] 04/18/2022    Pulmonary edema with diastolic CHF, NYHA class 3 ([address] 03/17/2022    [place]iberty-rectal abscess     Somnolence     Atrial flutter ([address]     SIRS (systemic inflammatory response syndrome) ([address] 02/21/2022    NSTEMI (non-ST elevated myocardial infarction) ([address] 01/12/2022    Acute respiratory failure with hypercapnia ([address] 11/30/2021    Acute pulmonary edema ([address] 11/30/2021    Grade II diastolic dysfunction 04/74/4956    Shock circulatory ([address] 11/30/2021    Smoker 11/30/2021    Normocytic normochromic anemia 11/30/2021    Respiratory failure with hypoxia ([address] 11/29/2021    Speech problem     Urinary tract infection with hematuria     Acute CVA (cerebrovascular accident) ([address] 09/07/2021    Acute hypoxemic respiratory failure ([address] 08/12/2021    Type 2 diabetes mellitus with hyperglycemia ([address] 06/27/2021    Acute encephalopathy 06/27/2021    Cellulitis and abscess of hand 04/24/2021    Iliac artery occlusion, right (HCC)     Cellulitis of right foot 01/29/2021    Peripheral vascular occlusive disease ([address] 01/02/2021    Ataxia     Weakness of both lower extremities 12/30/2020    Sinus bradycardia 12/30/2020    Sinus pause     GBS (Guillain-Buxton syndrome) ([facility])     Bilateral leg weakness 12/04/2020    Bradycardia 10/19/2019    Syncope and collapse 10/19/2019    PAF (paroxysmal atrial fibrillation) (HCC)     Hypercholesteremia 07/30/2019    Chronic bronchitis ([facility] 05/21/2019    Nasal congestion 05/21/2019    Chronic, continuous use of opioids 04/15/2019    Steal syndrome of dialysis vascular access (HCC)     SOB (shortness of breath) on exertion 12/24/2018    Anemia of chronic disease 11/12/2018    Pulmonary edema 11/09/2018    Fluid overload 11/09/2018    Renovascular hypertension     Mixed hyperlipidemia     Cigarette nicotine dependence in remission     Neuromuscular disorder (HCC)     Acute diastolic CHF (congestive heart failure) ([address] 03/18/2018    Hemodialysis-associated hypotension 11/22/2017    ESRD (end stage renal disease) on dialysis ([address] 11/22/2017    Mucus plugging of bronchi     Nonrheumatic aortic valve stenosis     Pleural effusion on left     Chronic anemia     History of CVA (cerebrovascular accident)     Type 2 diabetes mellitus without complication, without long-term current use of insulin ([facility])     ZAINAB (obstructive sleep apnea)     Tobacco abuse 05/21/2017    CVA (cerebral vascular accident) ([address] 05/21/2017    Angina, class IV ([address]     Dyspnea     Gastroparesis due to DM ([address]     Biliary colic 98/01/5034    Symptomatic cholelithiasis 01/04/2017    Degeneration of lumbar or lumbosacral intervertebral disc 03/18/2016    Lumbar radiculopathy 03/18/2016    Lumbosacral spondylosis without myelopathy 03/18/2016    ZAINAB on CPAP 02/11/2016    Obesity (BMI 30-39. 9)     PNA (pneumonia) 03/28/2015    Depression with anxiety 06/05/2014    Passive smoke exposure 05/30/2014    Diabetic neuropathy ([address] 11/14/2013    Claudication in peripheral vascular disease ([address] 06/26/2013    Bilateral hilar adenopathy syndrome 06/03/2013    Sepsis without acute organ dysfunction ([address] 12/25/2012       ASSESSMENT AND PLAN     #ESRD:On dialysis TTS at [facility]  Prior target weight of 102 kg but after recent hospitalization he was down to 99 kg ( outpatient )  -Has working left [employer]. Prior fistula with steal syndrome and severe needle phobia s/p fistula ligation   -HD today per MWF schedule    #Shortness of breath:multifactorial  Recent pneumonia.     Recurrent L pleural effusion  -pulmonary following; agree that he may need pleurodesis, or pleurX  -po fluid restriction , decrease DW at HD as tolerated     #Anemia of chronic disease:  Getting DAVON at dialysis    #Hypertension:    Range improving

## 2022-12-14 NOTE — PROGRESS NOTES
Physical Therapy/Occupational Therapy    Attempted PT/OT Treatment  Pt off floor for Dialysis   Will continue to follow. Thank you.     Electronically signed by Gwen Higginbotham OT on 12/14/2022 at 10:14 AM

## 2022-12-14 NOTE — CARE COORDINATION
Discharge order noted. Pt is avoidable x1 day at this time. Awaiting pre cert approval to [employer]. Will follow. Addendum 0946am: Therapy to see Pt asap for updated notes. Will submit to insurance once obtained.

## 2022-12-14 NOTE — PROGRESS NOTES
Hospitalist Progress Note      PCP: Pcp No    Date of Admission: 12/4/2022    Chief Complaint: [facility] Course: reviewed      Subjective: resting in bed. Off bipap. No SOB. No chest pain. Medications:  Reviewed    Infusion Medications    sodium chloride      dextrose       Scheduled Medications    b complex-C-folic acid  1 capsule Oral Daily    aspirin  81 mg Oral Daily    epoetin siddharth-epbx  10,000 Units IntraVENous Once per day on Mon Wed Fri    [Held by provider] apixaban  2.5 mg Oral BID    calcium acetate  1 capsule Oral TID     [Held by provider] clopidogrel  75 mg Oral Daily    DULoxetine  30 mg Oral Daily    isosorbide dinitrate  20 mg Oral TID    metoprolol succinate  100 mg Oral BID    pantoprazole  40 mg Oral QAM AC    pravastatin  40 mg Oral Daily    QUEtiapine  50 mg Oral Nightly    sodium chloride flush  5-40 mL IntraVENous 2 times per day    insulin lispro  0-4 Units SubCUTAneous TID WC    insulin lispro  0-4 Units SubCUTAneous Nightly     PRN Meds: heparin (porcine), perflutren lipid microspheres, calcium carbonate, gabapentin, sodium chloride flush, sodium chloride, ondansetron **OR** ondansetron, polyethylene glycol, acetaminophen **OR** acetaminophen, glucose, dextrose bolus **OR** dextrose bolus, glucagon (rDNA), dextrose, oxyCODONE-acetaminophen, labetalol, ipratropium-albuterol      Intake/Output Summary (Last 24 hours) at 12/14/2022 1046  Last data filed at 12/13/2022 1745  Gross per 24 hour   Intake 480 ml   Output --   Net 480 ml         Physical Exam Performed:    /76   Pulse 72   Temp 97.9 °F (36.6 °C)   Resp 20   Ht 5' 9\" (1.753 m)   Wt 227 lb 1.2 oz (103 kg)   SpO2 98%   BMI 33.53 kg/m²   General appearance:  no distress, appears stated age and cooperative. HEENT: Pupils equal, round, and reactive to light. Conjunctivae/corneas clear. Neck: Supple, with full range of motion. No jugular venous distention. Trachea midline.   Respiratory:   Clear to auscultation, bilaterally without Rales/Wheezes/Rhonchi. Improved breath sounds bilaterally   Cardiovascular: Regular rate and rhythm with normal S1/S2 without murmurs, rubs or gallops. Abdomen: Soft, non-tender, non-distended with normal bowel sounds. Musculoskeletal: No clubbing, cyanosis or edema bilaterally. Full range of motion without deformity. Skin: Skin color, texture, turgor normal.  No rashes or lesions. Neurologic:  Neurovascularly intact without any focal sensory/motor deficits. Cranial nerves: II-XII intact, grossly non-focal.  Psychiatric: Alert and oriented, thought content appropriate, normal insight  Capillary Refill: Brisk, 3 seconds, normal   Peripheral Pulses: +2 palpable, equal bilaterally          Labs:   Recent Labs     12/13/22  1304 12/14/22  0536   WBC 9.7 8.5   HGB 7.9* 8.7*   HCT 24.6* 26.8*    296       Recent Labs     12/13/22  1304 12/14/22  0536   * 132*   K 6.2* 5.9*   CL 91* 93*   CO2 20* 21   * 67*   CREATININE 9.5* 7.2*   CALCIUM 8.7 8.7   PHOS 6.2* 5.2*       No results for input(s): AST, ALT, BILIDIR, BILITOT, ALKPHOS in the last 72 hours. Recent Labs     12/12/22  1113   INR 1.26*     No results for input(s): Maryann Bigness in the last 72 hours. Urinalysis:      Lab Results   Component Value Date/Time    NITRU Negative 12/04/2022 01:19 PM    WBCUA 6-9 12/04/2022 01:19 PM    BACTERIA 3+ 05/29/2022 12:51 PM    RBCUA 0-2 12/04/2022 01:19 PM    BLOODU TRACE-INTACT 12/04/2022 01:19 PM    SPECGRAV 1.020 12/04/2022 01:19 PM    GLUCOSEU 500 12/04/2022 01:19 PM       Radiology:  XR CHEST PORTABLE   Final Result   No evidence of pneumothorax following left thoracentesis. US THORACENTESIS Which side should the procedure be performed? Left   Final Result   Successful ultrasound guided left thoracentesis. XR CHEST (2 VW)   Final Result   Relatively stable left pleural effusion with increasing pattern of vascular   congestion centrally. XR CHEST (2 VW)   Final Result   Stable left pleural effusion with associated airspace change in the left   lower lobe. Improved aeration in the right lung. XR CHEST PORTABLE   Final Result   Worsening aeration in the right lower lung zone, otherwise no significant   change compared with 12/02/2022. IP CONSULT TO HOSPITALIST  IP CONSULT TO HEART FAILURE NURSE/COORDINATOR  IP CONSULT TO DIETITIAN  IP CONSULT TO NEPHROLOGY  IP CONSULT TO PULMONOLOGY  IP CONSULT TO CASE MANAGEMENT  IP CONSULT TO CARDIOLOGY  IP CONSULT TO CARDIOTHORACIC SURGERY    Assessment/Plan:    Active Hospital Problems    Diagnosis     Acute on chronic combined systolic and diastolic heart failure (HCC) [I50.43]      Priority: High    Ischemic cardiomyopathy [I25.5]      Priority: High    DM2 (diabetes mellitus, type 2) ([facility] [E11.9]      Priority: High    Type 2 [facility] infarction ([facility] [I65. A1]      Priority: Medium    Compression atelectasis [J98.11]      Priority: Medium    Acute on chronic respiratory failure (HCC) [J96.20]      Priority: Medium    SOB (shortness of breath) [R06.02]      Priority: Medium    Former smoker [C99.200]      Priority: Medium    History of transcatheter aortic valve replacement (TAVR) [Z95.2]      Priority: Medium    Asthma-COPD overlap syndrome ([facility] [J44.9]      Priority: Medium    Grade II diastolic dysfunction [R70.93]     Renovascular hypertension [I15.0]     ESRD (end stage renal disease) on dialysis ([facility] [N18.6, Z99.2]     Pleural effusion on left [J90]     Chronic anemia [D64.9]     ZAINAB (obstructive sleep apnea) [G47.33]     Obesity (BMI 30-39. 9) [E66.9]        Acute on chronic respiratory failure with Hypoxia: Recurrent presentation, just here 2 days prior. Frequent admissions for fluid overload and missed dialysis. However, reports compliance with HD this week. Recent treatment for Pneumonia. Currently, he does have mild leukocytosis but lacking other features of pneumonia.   - checked Procalcitonin and elevated compared to prior(chronically elevated)  - continued treatment for presumed volume overload/CHF with IV Lasix and HD. Nephrology consulted. May need input from Cardiology and Pulmonology (not yet ordered). He does report using NIPPV at home (unclear if CPAP or BIPAP). Wonder if this may need to be adjusted. -repeat cxr 12/6 noted left effusion/recurrent, pulm consulted(did not rec tap, rather adjust HD)   -cards consulted, apprec recs,  - limited echo ordered to r/o pericard effusion  -repeat CXR 12/8 noted stable effusion  -CTsurg consulted, rec thoracentesis at this time, f/u as outpt for pleur-x decision     Acute on chronic combined systolic and diastolic heart failure: Known LVEF 20-25%. Continued IV diuresis and HD. Monitor I/O, weights, BMP. Medical management.    -cards consulted to see if further adjustment in meds needed, rec IV heparin if prolonged off eliquis,   -ok to stop aspirin once plavix/eliquis restarted  Fluid management with HD. Cardiology will investigate why entresto was stopped. Optimize GDMT and reassess in followup     Diabetes Mellitus, Type 2: Diet Controlled. Recheck HbA1c and 7.0  - Low SSI if needed. Morbid Obesity -  With Body mass index is 32.49 kg/m². Complicating assessment and treatment. Placing patient at risk for multiple co-morbidities as well as early death and contributing to the patient's presentation. Paroxysmal Atrial Fibrillation: Secondary hypercoag state secondary to afib. Stable. Continued Eliquis 2.5 mg BID  Continue aspirin while plavix and eliquis held (for thoracentesis), had PCI 1/2022; ok to stop aspirin once plavix and eliquis restarted; if prolonged period off of AC anticipated, consider IV heparin     DVT Prophylaxis: Eliquis(held for thoracentesis)  Diet: ADULT DIET;  Regular; 1000 ml  ADULT ORAL NUTRITION SUPPLEMENT; Lunch, Dinner; Fortified Pudding Oral Supplement  Code Status: Full Code  PT/OT Eval Status: home pt/ot    Dispo - Stable for DC  Appropriate for A1 Discharge Unit: Yes      Izabel Goss, DO

## 2022-12-16 ENCOUNTER — FOLLOWUP TELEPHONE ENCOUNTER (OUTPATIENT)
Dept: TELEMETRY | Age: 52
End: 2022-12-16

## 2022-12-16 NOTE — DISCHARGE SUMMARY
Hospital Medicine Discharge Summary    Patient ID: Michael Herrmann      Patient's PCP: Pcp No    Admit Date: 12/4/2022     Discharge Date: 12/14/2022      Admitting Provider: No admitting provider for patient encounter. Discharge Provider: Harley Willard DO     Discharge Diagnoses: Active Hospital Problems    Diagnosis     Acute on chronic combined systolic and diastolic heart failure (HCC) [I50.43]      Priority: High    Ischemic cardiomyopathy [I25.5]      Priority: High    DM2 (diabetes mellitus, type 2) ([facility]) [E11.9]      Priority: High    Type 2 myocardial infarction ([facility] [K56. A1]      Priority: Medium    Compression atelectasis [J98.11]      Priority: Medium    Acute on chronic respiratory failure (HCC) [J96.20]      Priority: Medium    SOB (shortness of breath) [R06.02]      Priority: Medium    Former smoker [C32.758]      Priority: Medium    History of transcatheter aortic valve replacement (TAVR) [Z95.2]      Priority: Medium    Asthma-COPD overlap syndrome ([facility] [J44.9]      Priority: Medium    Grade II diastolic dysfunction [U08.53]     Renovascular hypertension [I15.0]     ESRD (end stage renal disease) on dialysis ([facility] [N18.6, Z99.2]     Pleural effusion on left [J90]     Chronic anemia [D64.9]     ZAINAB (obstructive sleep apnea) [G47.33]     Obesity (BMI 30-39. 9) [E66.9]        The patient was seen and examined on day of discharge and this discharge summary is in conjunction with any daily progress note from day of discharge. Hospital Course:       Acute on chronic respiratory failure with Hypoxia: Recurrent presentation, just here 2 days prior. Frequent admissions for fluid overload and missed dialysis. However, reports compliance with HD this week. Recent treatment for Pneumonia. Currently, he does have mild leukocytosis but lacking other features of pneumonia.   - checked Procalcitonin and elevated compared to prior(chronically elevated)  - continued treatment for presumed volume overload/CHF with IV Lasix and HD. Nephrology consulted. May need input from Cardiology and Pulmonology (not yet ordered). He does report using NIPPV at home (unclear if CPAP or BIPAP). -repeat cxr 12/6 noted left effusion/recurrent, pulm consulted(did not rec tap, rather adjust HD)   -cards consulted, apprec recs,  - limited echo ordered to r/o pericard effusion  -repeat CXR 12/8 noted stable effusion  -CTsurg consulted, rec thoracentesis at this time which was completed. f/u as outpt for pleur-x decision     Acute on chronic combined systolic and diastolic heart failure: Known LVEF 20-25%. Continued IV diuresis and HD. Monitor I/O, weights, BMP. Medical management.    -cards consulted to see if further adjustment in meds needed, stop aspirin once plavix/eliquis restarted at discharge   Fluid management with HD. Cardiology will investigate why entresto was stopped. Optimize GDMT and reassess in followup      Diabetes Mellitus, Type 2: Diet Controlled. Recheck HbA1c and 7.0  - Low SSI if needed. Morbid Obesity -  With Body mass index is 32.49 kg/m². Complicating assessment and treatment. Placing patient at risk for multiple co-morbidities as well as early death and contributing to the patient's presentation. Paroxysmal Atrial Fibrillation: Secondary hypercoag state secondary to afib. Stable. Continued Eliquis 2.5 mg BID   plavix and eliquis         Physical Exam Performed:     BP (!) 155/82   Pulse 68   Temp 98.2 °F (36.8 °C) (Oral)   Resp 20   Ht 5' 9\" (1.753 m)   Wt 220 lb 10.9 oz (100.1 kg)   SpO2 98%   BMI 32.59 kg/m²       General appearance:  no distress, appears stated age and cooperative. HEENT: Pupils equal, round, and reactive to light. Conjunctivae/corneas clear. Neck: Supple, with full range of motion. No jugular venous distention. Trachea midline. Respiratory:   Clear to auscultation, bilaterally without Rales/Wheezes/Rhonchi.  Improved breath sounds bilaterally Cardiovascular: Regular rate and rhythm with normal S1/S2 without murmurs, rubs or gallops. Abdomen: Soft, non-tender, non-distended with normal bowel sounds. Musculoskeletal: No clubbing, cyanosis or edema bilaterally. Full range of motion without deformity. Skin: Skin color, texture, turgor normal.  No rashes or lesions. Neurologic:  Neurovascularly intact without any focal sensory/motor deficits. Cranial nerves: II-XII intact, grossly non-focal.  Psychiatric: Alert and oriented, thought content appropriate, normal insight  Capillary Refill: Brisk, 3 seconds, normal   Peripheral Pulses: +2 palpable, equal bilaterally       Labs: For convenience and continuity at follow-up the following most recent labs are provided:      CBC:    Lab Results   Component Value Date/Time    WBC 8.5 12/14/2022 05:36 AM    HGB 8.7 12/14/2022 05:36 AM    HCT 26.8 12/14/2022 05:36 AM     12/14/2022 05:36 AM       Renal:    Lab Results   Component Value Date/Time     12/14/2022 05:36 AM    K 5.9 12/14/2022 05:36 AM    K 3.8 12/02/2022 11:54 PM    CL 93 12/14/2022 05:36 AM    CO2 21 12/14/2022 05:36 AM    BUN 67 12/14/2022 05:36 AM    CREATININE 7.2 12/14/2022 05:36 AM    CALCIUM 8.7 12/14/2022 05:36 AM    PHOS 5.2 12/14/2022 05:36 AM         Significant Diagnostic Studies    Radiology:   XR CHEST PORTABLE   Final Result   No evidence of pneumothorax following left thoracentesis. US THORACENTESIS Which side should the procedure be performed? Left   Final Result   Successful ultrasound guided left thoracentesis. XR CHEST (2 VW)   Final Result   Relatively stable left pleural effusion with increasing pattern of vascular   congestion centrally. XR CHEST (2 VW)   Final Result   Stable left pleural effusion with associated airspace change in the left   lower lobe. Improved aeration in the right lung.          XR CHEST PORTABLE   Final Result   Worsening aeration in the right lower lung zone, otherwise no significant   change compared with 12/02/2022. Consults:     IP CONSULT TO HOSPITALIST  IP CONSULT TO HEART FAILURE NURSE/COORDINATOR  IP CONSULT TO DIETITIAN  IP CONSULT TO NEPHROLOGY  IP CONSULT TO PULMONOLOGY  IP CONSULT TO CASE MANAGEMENT  IP CONSULT TO CARDIOLOGY  IP CONSULT TO CARDIOTHORACIC SURGERY  IP CONSULT TO HOME CARE NEEDS    Disposition:  EGS     Condition at Discharge: Stable    Discharge Instructions/Follow-up:    f/u as outpt for pleur-x decision    Code Status:  Prior     Activity: activity as tolerated    Diet: renal diet      Discharge Medications:     Discharge Medication List as of 12/14/2022  2:06 PM             Details   docusate sodium (DOK) 100 MG capsule Take 1 capsule by mouth as needed for ConstipationDC to SNF      sennosides-docusate sodium (SENOKOT-S) 8.6-50 MG tablet Take 1 tablet by mouth as needed for ConstipationDC to SNF                Details   apixaban (ELIQUIS) 2.5 MG TABS tablet Take 1 tablet by mouth 2 times daily, Disp-60 tablet, R-0NO PRINT      gabapentin (NEURONTIN) 100 MG capsule Take 2 capsules by mouth 3 times daily as needed (neuropathic pain) for up to 10 days. , Disp-60 capsule, R-0Print      metoprolol succinate (TOPROL XL) 100 MG extended release tablet Take 1 tablet by mouth in the morning and at bedtime, Disp-30 tablet, R-3DC to SNF      cyclobenzaprine (FLEXERIL) 5 MG tablet Muscle spasmsDC to [facility]      QUEtiapine (SEROQUEL) 50 MG tablet TAKE 1 TABLET BY MOUTH EVERY EVENING, Disp-30 tablet, R-10Normal      isosorbide dinitrate (ISORDIL) 20 MG tablet Take 1 tablet by mouth 3 times daily, Disp-90 tablet, R-3Normal      clopidogrel (PLAVIX) 75 MG tablet Take 1 tablet by mouth daily, Disp-90 tablet, R-3Normal      pantoprazole (PROTONIX) 40 MG tablet TAKE 1 TABLET BY MOUTH EVERY MORNING BEFORE BREAKFAST, Disp-30 tablet, R-10Normal      DULoxetine (CYMBALTA) 30 MG extended release capsule TAKE 1 CAPSULE BY MOUTH EVERY DAY, Disp-30 capsule, R-10Normal      pravastatin (PRAVACHOL) 40 MG tablet Take 1 tablet by mouth daily, Disp-90 tablet, R-3Normal      B Complex-C-Folic Acid (VIRT-CAPS) 1 MG CAPS TAKE 1 CAPSULE BY MOUTH EVERY DAY, Disp-90 capsule, R-1Normal      Calcium Acetate, Phos Binder, 667 MG CAPS TAKE 1 CAPSULE BY MOUTH THREE TIMES DAILY WITH MEALS, Disp-90 capsule, R-3Normal      nitroGLYCERIN (NITROSTAT) 0.4 MG SL tablet DISSOLVE 1 TABLET UNDER THE TONGUE AS NEEDED FOR CHEST PAIN EVERY 5 MINUTES UP TO 3 TIMES. IF NO RELIEF CALL 911., Disp-25 tablet, R-10Normal      vitamin D (ERGOCALCIFEROL) 74458 units CAPS capsule TK 1 C PO WEEKLY, R-11Historical Med      Alcohol Swabs PADS Disp-300 each, R-3, NormalUSE AS DIRECTED      ipratropium-albuterol (DUONEB) 0.5-2.5 (3) MG/3ML SOLN nebulizer solution Inhale 3 mLs into the lungs every 6 hours as needed for Shortness of Breath, Disp-360 mL, R-1Print      calcium carbonate (TUMS) 500 MG chewable tablet Take 1 tablet by mouth 3 times daily as needed for Heartburn. Historical Med             Time Spent on discharge: 35 minutes in the examination, evaluation, counseling and review of medications and discharge plan. Signed:    Deloris Contreras DO   12/15/2022      Thank you Pcp Jazmín for the opportunity to be involved in this patient's care. If you have any questions or concerns, please feel free to contact me at [phone].

## 2022-12-16 NOTE — TELEPHONE ENCOUNTER
THird and final attempt at hospital follow up without success. VM Box full and unable to leave message.   Shy Wilder RN

## 2022-12-16 NOTE — TELEPHONE ENCOUNTER
First attempt at hospital follow up. Mailbox full and unable to leave message. Will attempt again at later time.  Dominique Hawkins RN

## 2022-12-25 ENCOUNTER — HOSPITAL ENCOUNTER (EMERGENCY)
Age: 52
Discharge: OTHER INSTITUTION | End: 2022-12-25
Attending: EMERGENCY MEDICINE
Payer: MEDICARE

## 2022-12-25 ENCOUNTER — HOSPITAL ENCOUNTER (INPATIENT)
Age: 52
LOS: 2 days | Discharge: HOME | DRG: 291 | End: 2022-12-27
Attending: INTERNAL MEDICINE | Admitting: INTERNAL MEDICINE
Payer: MEDICARE

## 2022-12-25 ENCOUNTER — APPOINTMENT (OUTPATIENT)
Dept: GENERAL RADIOLOGY | Age: 52
End: 2022-12-25
Payer: MEDICARE

## 2022-12-25 VITALS
RESPIRATION RATE: 20 BRPM | OXYGEN SATURATION: 98 % | HEART RATE: 90 BPM | WEIGHT: 220 LBS | BODY MASS INDEX: 32.49 KG/M2 | SYSTOLIC BLOOD PRESSURE: 163 MMHG | DIASTOLIC BLOOD PRESSURE: 88 MMHG

## 2022-12-25 DIAGNOSIS — J18.9 PNEUMONIA OF RIGHT LOWER LOBE DUE TO INFECTIOUS ORGANISM: ICD-10-CM

## 2022-12-25 DIAGNOSIS — I48.91 ATRIAL FIBRILLATION WITH RVR (HCC): ICD-10-CM

## 2022-12-25 DIAGNOSIS — R65.20 SEVERE SEPSIS (HCC): ICD-10-CM

## 2022-12-25 DIAGNOSIS — Z91.15 DIALYSIS PATIENT, NONCOMPLIANT (HCC): ICD-10-CM

## 2022-12-25 DIAGNOSIS — J96.01 ACUTE RESPIRATORY FAILURE WITH HYPOXIA (HCC): Primary | ICD-10-CM

## 2022-12-25 DIAGNOSIS — J90 RECURRENT LEFT PLEURAL EFFUSION: ICD-10-CM

## 2022-12-25 DIAGNOSIS — N18.6 ESRD (END STAGE RENAL DISEASE) (HCC): ICD-10-CM

## 2022-12-25 DIAGNOSIS — E87.5 HYPERKALEMIA: ICD-10-CM

## 2022-12-25 DIAGNOSIS — A41.9 SEVERE SEPSIS (HCC): ICD-10-CM

## 2022-12-25 PROBLEM — E87.20 METABOLIC ACIDEMIA: Status: ACTIVE | Noted: 2022-12-25

## 2022-12-25 PROBLEM — R91.8 PULMONARY INFILTRATE: Status: ACTIVE | Noted: 2022-12-25

## 2022-12-25 PROBLEM — E87.1 HYPONATREMIA: Status: ACTIVE | Noted: 2022-12-25

## 2022-12-25 PROBLEM — J96.21 ACUTE ON CHRONIC RESPIRATORY FAILURE WITH HYPOXIA (HCC): Status: ACTIVE | Noted: 2022-12-04

## 2022-12-25 PROBLEM — D72.829 LEUKOCYTOSIS: Status: ACTIVE | Noted: 2022-12-25

## 2022-12-25 LAB
A/G RATIO: 1 (ref 1.1–2.2)
ALBUMIN SERPL-MCNC: 3.8 G/DL (ref 3.4–5)
ALBUMIN SERPL-MCNC: 3.8 G/DL (ref 3.4–5)
ALBUMIN SERPL-MCNC: 4.1 G/DL (ref 3.4–5)
ALP BLD-CCNC: 91 U/L (ref 40–129)
ALT SERPL-CCNC: 11 U/L (ref 10–40)
ANION GAP SERPL CALCULATED.3IONS-SCNC: 15 MMOL/L (ref 3–16)
ANION GAP SERPL CALCULATED.3IONS-SCNC: 17 MMOL/L (ref 3–16)
ANION GAP SERPL CALCULATED.3IONS-SCNC: 19 MMOL/L (ref 3–16)
AST SERPL-CCNC: 11 U/L (ref 15–37)
BACTERIA: ABNORMAL /HPF
BASE EXCESS VENOUS: -5 MMOL/L (ref -3–3)
BASOPHILS ABSOLUTE: 0.1 K/UL (ref 0–0.2)
BASOPHILS RELATIVE PERCENT: 0.4 %
BILIRUB SERPL-MCNC: 0.3 MG/DL (ref 0–1)
BILIRUBIN URINE: NEGATIVE
BLOOD, URINE: ABNORMAL
BUN BLDV-MCNC: 119 MG/DL (ref 7–20)
BUN BLDV-MCNC: 128 MG/DL (ref 7–20)
BUN BLDV-MCNC: 88 MG/DL (ref 7–20)
CALCIUM IONIZED: 1.04 MMOL/L (ref 1.12–1.32)
CALCIUM SERPL-MCNC: 8.5 MG/DL (ref 8.3–10.6)
CALCIUM SERPL-MCNC: 8.6 MG/DL (ref 8.3–10.6)
CALCIUM SERPL-MCNC: 8.8 MG/DL (ref 8.3–10.6)
CARBOXYHEMOGLOBIN: 1.7 % (ref 0–1.5)
CHLORIDE BLD-SCNC: 88 MMOL/L (ref 99–110)
CHLORIDE BLD-SCNC: 91 MMOL/L (ref 99–110)
CHLORIDE BLD-SCNC: 95 MMOL/L (ref 99–110)
CLARITY: CLEAR
CO2: 19 MMOL/L (ref 21–32)
CO2: 22 MMOL/L (ref 21–32)
CO2: 22 MMOL/L (ref 21–32)
COLOR: YELLOW
CREAT SERPL-MCNC: 6 MG/DL (ref 0.9–1.3)
CREAT SERPL-MCNC: 8.8 MG/DL (ref 0.9–1.3)
CREAT SERPL-MCNC: 9 MG/DL (ref 0.9–1.3)
EKG ATRIAL RATE: 234 BPM
EKG DIAGNOSIS: NORMAL
EKG Q-T INTERVAL: 302 MS
EKG QRS DURATION: 100 MS
EKG QTC CALCULATION (BAZETT): 449 MS
EKG R AXIS: 181 DEGREES
EKG T AXIS: 91 DEGREES
EKG VENTRICULAR RATE: 133 BPM
EOSINOPHILS ABSOLUTE: 0.3 K/UL (ref 0–0.6)
EOSINOPHILS RELATIVE PERCENT: 1.7 %
EPITHELIAL CELLS, UA: ABNORMAL /HPF (ref 0–5)
GFR SERPL CREATININE-BSD FRML MDRD: 10 ML/MIN/{1.73_M2}
GFR SERPL CREATININE-BSD FRML MDRD: 6 ML/MIN/{1.73_M2}
GFR SERPL CREATININE-BSD FRML MDRD: 7 ML/MIN/{1.73_M2}
GLUCOSE BLD-MCNC: 254 MG/DL (ref 70–99)
GLUCOSE BLD-MCNC: 257 MG/DL (ref 70–99)
GLUCOSE BLD-MCNC: 319 MG/DL (ref 70–99)
GLUCOSE BLD-MCNC: 340 MG/DL (ref 70–99)
GLUCOSE BLD-MCNC: 415 MG/DL (ref 70–99)
GLUCOSE URINE: 500 MG/DL
HCO3 VENOUS: 21.4 MMOL/L (ref 23–29)
HCT VFR BLD CALC: 25.9 % (ref 40.5–52.5)
HEMOGLOBIN: 8.6 G/DL (ref 13.5–17.5)
INFLUENZA A: NOT DETECTED
INFLUENZA B: NOT DETECTED
KETONES, URINE: NEGATIVE MG/DL
LACTIC ACID, SEPSIS: 1.9 MMOL/L (ref 0.4–1.9)
LEUKOCYTE ESTERASE, URINE: NEGATIVE
LYMPHOCYTES ABSOLUTE: 0.6 K/UL (ref 1–5.1)
LYMPHOCYTES RELATIVE PERCENT: 3.8 %
MAGNESIUM: 2.2 MG/DL (ref 1.8–2.4)
MCH RBC QN AUTO: 29.9 PG (ref 26–34)
MCHC RBC AUTO-ENTMCNC: 33.1 G/DL (ref 31–36)
MCV RBC AUTO: 90.1 FL (ref 80–100)
METHEMOGLOBIN VENOUS: 0.3 %
MICROSCOPIC EXAMINATION: YES
MONOCYTES ABSOLUTE: 0.9 K/UL (ref 0–1.3)
MONOCYTES RELATIVE PERCENT: 5.5 %
NEUTROPHILS ABSOLUTE: 14.1 K/UL (ref 1.7–7.7)
NEUTROPHILS RELATIVE PERCENT: 88.6 %
NITRITE, URINE: NEGATIVE
O2 SAT, VEN: 88 %
O2 THERAPY: ABNORMAL
PCO2, VEN: 45.4 MMHG (ref 40–50)
PDW BLD-RTO: 16.6 % (ref 12.4–15.4)
PERFORMED ON: ABNORMAL
PERFORMED ON: ABNORMAL
PH UA: 7 (ref 5–8)
PH VENOUS: 7.29 (ref 7.35–7.45)
PH VENOUS: 7.35 (ref 7.35–7.45)
PHOSPHORUS: 2.8 MG/DL (ref 2.5–4.9)
PHOSPHORUS: 3.5 MG/DL (ref 2.5–4.9)
PLATELET # BLD: 394 K/UL (ref 135–450)
PMV BLD AUTO: 7.4 FL (ref 5–10.5)
PO2, VEN: 59.7 MMHG (ref 25–40)
POTASSIUM REFLEX MAGNESIUM: 6.1 MMOL/L (ref 3.5–5.1)
POTASSIUM SERPL-SCNC: 5.7 MMOL/L (ref 3.5–5.1)
POTASSIUM SERPL-SCNC: 8 MMOL/L (ref 3.5–5.1)
PRO-BNP: ABNORMAL PG/ML (ref 0–124)
PROTEIN UA: >=300 MG/DL
RBC # BLD: 2.88 M/UL (ref 4.2–5.9)
RBC UA: ABNORMAL /HPF (ref 0–4)
SARS-COV-2 RNA, RT PCR: NOT DETECTED
SODIUM BLD-SCNC: 124 MMOL/L (ref 136–145)
SODIUM BLD-SCNC: 132 MMOL/L (ref 136–145)
SODIUM BLD-SCNC: 132 MMOL/L (ref 136–145)
SPECIFIC GRAVITY UA: 1.02 (ref 1–1.03)
TCO2 CALC VENOUS: 23 MMOL/L
TOTAL PROTEIN: 7.5 G/DL (ref 6.4–8.2)
TROPONIN: 0.15 NG/ML
URINE REFLEX TO CULTURE: ABNORMAL
URINE TYPE: ABNORMAL
UROBILINOGEN, URINE: 0.2 E.U./DL
WBC # BLD: 16 K/UL (ref 4–11)
WBC UA: ABNORMAL /HPF (ref 0–5)

## 2022-12-25 PROCEDURE — 36415 COLL VENOUS BLD VENIPUNCTURE: CPT

## 2022-12-25 PROCEDURE — 6370000000 HC RX 637 (ALT 250 FOR IP): Performed by: INTERNAL MEDICINE

## 2022-12-25 PROCEDURE — 6370000000 HC RX 637 (ALT 250 FOR IP): Performed by: EMERGENCY MEDICINE

## 2022-12-25 PROCEDURE — 85025 COMPLETE CBC W/AUTO DIFF WBC: CPT

## 2022-12-25 PROCEDURE — 6360000002 HC RX W HCPCS: Performed by: INTERNAL MEDICINE

## 2022-12-25 PROCEDURE — 87636 SARSCOV2 & INF A&B AMP PRB: CPT

## 2022-12-25 PROCEDURE — 96376 TX/PRO/DX INJ SAME DRUG ADON: CPT

## 2022-12-25 PROCEDURE — 94660 CPAP INITIATION&MGMT: CPT

## 2022-12-25 PROCEDURE — 83605 ASSAY OF LACTIC ACID: CPT

## 2022-12-25 PROCEDURE — 82803 BLOOD GASES ANY COMBINATION: CPT

## 2022-12-25 PROCEDURE — 96375 TX/PRO/DX INJ NEW DRUG ADDON: CPT

## 2022-12-25 PROCEDURE — 2580000003 HC RX 258: Performed by: EMERGENCY MEDICINE

## 2022-12-25 PROCEDURE — 2500000003 HC RX 250 WO HCPCS: Performed by: INTERNAL MEDICINE

## 2022-12-25 PROCEDURE — 83735 ASSAY OF MAGNESIUM: CPT

## 2022-12-25 PROCEDURE — 80053 COMPREHEN METABOLIC PANEL: CPT

## 2022-12-25 PROCEDURE — 2700000000 HC OXYGEN THERAPY PER DAY

## 2022-12-25 PROCEDURE — 80069 RENAL FUNCTION PANEL: CPT

## 2022-12-25 PROCEDURE — 94761 N-INVAS EAR/PLS OXIMETRY MLT: CPT

## 2022-12-25 PROCEDURE — 2580000003 HC RX 258: Performed by: INTERNAL MEDICINE

## 2022-12-25 PROCEDURE — 83036 HEMOGLOBIN GLYCOSYLATED A1C: CPT

## 2022-12-25 PROCEDURE — 83880 ASSAY OF NATRIURETIC PEPTIDE: CPT

## 2022-12-25 PROCEDURE — 71045 X-RAY EXAM CHEST 1 VIEW: CPT

## 2022-12-25 PROCEDURE — 96365 THER/PROPH/DIAG IV INF INIT: CPT

## 2022-12-25 PROCEDURE — 99285 EMERGENCY DEPT VISIT HI MDM: CPT

## 2022-12-25 PROCEDURE — 6360000002 HC RX W HCPCS: Performed by: EMERGENCY MEDICINE

## 2022-12-25 PROCEDURE — 2500000003 HC RX 250 WO HCPCS: Performed by: EMERGENCY MEDICINE

## 2022-12-25 PROCEDURE — 93010 ELECTROCARDIOGRAM REPORT: CPT | Performed by: INTERNAL MEDICINE

## 2022-12-25 PROCEDURE — 87040 BLOOD CULTURE FOR BACTERIA: CPT

## 2022-12-25 PROCEDURE — 2000000000 HC ICU R&B

## 2022-12-25 PROCEDURE — 81001 URINALYSIS AUTO W/SCOPE: CPT

## 2022-12-25 PROCEDURE — 96367 TX/PROPH/DG ADDL SEQ IV INF: CPT

## 2022-12-25 PROCEDURE — 82330 ASSAY OF CALCIUM: CPT

## 2022-12-25 PROCEDURE — 84484 ASSAY OF TROPONIN QUANT: CPT

## 2022-12-25 PROCEDURE — 99223 1ST HOSP IP/OBS HIGH 75: CPT | Performed by: INTERNAL MEDICINE

## 2022-12-25 PROCEDURE — 90945 DIALYSIS ONE EVALUATION: CPT

## 2022-12-25 PROCEDURE — 93005 ELECTROCARDIOGRAM TRACING: CPT | Performed by: EMERGENCY MEDICINE

## 2022-12-25 RX ORDER — ONDANSETRON 4 MG/1
4 TABLET, ORALLY DISINTEGRATING ORAL EVERY 8 HOURS PRN
Status: DISCONTINUED | OUTPATIENT
Start: 2022-12-25 | End: 2022-12-27 | Stop reason: HOSPADM

## 2022-12-25 RX ORDER — FUROSEMIDE 10 MG/ML
60 INJECTION INTRAMUSCULAR; INTRAVENOUS ONCE
Status: COMPLETED | OUTPATIENT
Start: 2022-12-25 | End: 2022-12-25

## 2022-12-25 RX ORDER — DILTIAZEM HYDROCHLORIDE 5 MG/ML
10 INJECTION INTRAVENOUS ONCE
Status: COMPLETED | OUTPATIENT
Start: 2022-12-25 | End: 2022-12-25

## 2022-12-25 RX ORDER — DEXTROSE MONOHYDRATE 100 MG/ML
INJECTION, SOLUTION INTRAVENOUS CONTINUOUS PRN
Status: DISCONTINUED | OUTPATIENT
Start: 2022-12-25 | End: 2022-12-27 | Stop reason: HOSPADM

## 2022-12-25 RX ORDER — OXYCODONE AND ACETAMINOPHEN 7.5; 325 MG/1; MG/1
1 TABLET ORAL ONCE
Status: DISCONTINUED | OUTPATIENT
Start: 2022-12-25 | End: 2022-12-27 | Stop reason: HOSPADM

## 2022-12-25 RX ORDER — INSULIN GLARGINE 100 [IU]/ML
0.15 INJECTION, SOLUTION SUBCUTANEOUS NIGHTLY
Status: DISCONTINUED | OUTPATIENT
Start: 2022-12-25 | End: 2022-12-27 | Stop reason: HOSPADM

## 2022-12-25 RX ORDER — DEXTROSE MONOHYDRATE 25 G/50ML
25 INJECTION, SOLUTION INTRAVENOUS ONCE
Status: DISCONTINUED | OUTPATIENT
Start: 2022-12-25 | End: 2022-12-25 | Stop reason: ALTCHOICE

## 2022-12-25 RX ORDER — INSULIN LISPRO 100 [IU]/ML
0-8 INJECTION, SOLUTION INTRAVENOUS; SUBCUTANEOUS EVERY 4 HOURS
Status: DISCONTINUED | OUTPATIENT
Start: 2022-12-25 | End: 2022-12-27 | Stop reason: HOSPADM

## 2022-12-25 RX ORDER — SODIUM CHLORIDE 9 MG/ML
INJECTION, SOLUTION INTRAVENOUS PRN
Status: DISCONTINUED | OUTPATIENT
Start: 2022-12-25 | End: 2022-12-27 | Stop reason: HOSPADM

## 2022-12-25 RX ORDER — CLOPIDOGREL BISULFATE 75 MG/1
75 TABLET ORAL DAILY
Status: DISCONTINUED | OUTPATIENT
Start: 2022-12-25 | End: 2022-12-27 | Stop reason: HOSPADM

## 2022-12-25 RX ORDER — PANTOPRAZOLE SODIUM 40 MG/1
40 TABLET, DELAYED RELEASE ORAL
Status: DISCONTINUED | OUTPATIENT
Start: 2022-12-26 | End: 2022-12-27 | Stop reason: HOSPADM

## 2022-12-25 RX ORDER — QUETIAPINE FUMARATE 25 MG/1
50 TABLET, FILM COATED ORAL NIGHTLY
Status: DISCONTINUED | OUTPATIENT
Start: 2022-12-25 | End: 2022-12-27 | Stop reason: HOSPADM

## 2022-12-25 RX ORDER — CALCIUM GLUCONATE 20 MG/ML
1000 INJECTION, SOLUTION INTRAVENOUS PRN
Status: DISCONTINUED | OUTPATIENT
Start: 2022-12-25 | End: 2022-12-26

## 2022-12-25 RX ORDER — POTASSIUM CHLORIDE 29.8 MG/ML
20 INJECTION INTRAVENOUS PRN
Status: DISCONTINUED | OUTPATIENT
Start: 2022-12-25 | End: 2022-12-27

## 2022-12-25 RX ORDER — ONDANSETRON 2 MG/ML
4 INJECTION INTRAMUSCULAR; INTRAVENOUS ONCE
Status: COMPLETED | OUTPATIENT
Start: 2022-12-25 | End: 2022-12-25

## 2022-12-25 RX ORDER — ACETAMINOPHEN 325 MG/1
650 TABLET ORAL EVERY 6 HOURS PRN
Status: DISCONTINUED | OUTPATIENT
Start: 2022-12-25 | End: 2022-12-27 | Stop reason: HOSPADM

## 2022-12-25 RX ORDER — POLYETHYLENE GLYCOL 3350 17 G/17G
17 POWDER, FOR SOLUTION ORAL DAILY PRN
Status: DISCONTINUED | OUTPATIENT
Start: 2022-12-25 | End: 2022-12-27 | Stop reason: HOSPADM

## 2022-12-25 RX ORDER — MAGNESIUM SULFATE 1 G/100ML
1000 INJECTION INTRAVENOUS PRN
Status: DISCONTINUED | OUTPATIENT
Start: 2022-12-25 | End: 2022-12-27

## 2022-12-25 RX ORDER — SODIUM CHLORIDE 0.9 % (FLUSH) 0.9 %
5-40 SYRINGE (ML) INJECTION PRN
Status: DISCONTINUED | OUTPATIENT
Start: 2022-12-25 | End: 2022-12-27 | Stop reason: HOSPADM

## 2022-12-25 RX ORDER — OXYCODONE HYDROCHLORIDE AND ACETAMINOPHEN 5; 325 MG/1; MG/1
1 TABLET ORAL ONCE
Status: COMPLETED | OUTPATIENT
Start: 2022-12-25 | End: 2022-12-25

## 2022-12-25 RX ORDER — PRAVASTATIN SODIUM 40 MG
40 TABLET ORAL DAILY
Status: DISCONTINUED | OUTPATIENT
Start: 2022-12-25 | End: 2022-12-27 | Stop reason: HOSPADM

## 2022-12-25 RX ORDER — METOPROLOL SUCCINATE 50 MG/1
100 TABLET, EXTENDED RELEASE ORAL 2 TIMES DAILY
Status: DISCONTINUED | OUTPATIENT
Start: 2022-12-25 | End: 2022-12-27 | Stop reason: HOSPADM

## 2022-12-25 RX ORDER — FENTANYL CITRATE 50 UG/ML
50 INJECTION, SOLUTION INTRAMUSCULAR; INTRAVENOUS ONCE
Status: COMPLETED | OUTPATIENT
Start: 2022-12-25 | End: 2022-12-25

## 2022-12-25 RX ORDER — ACETAMINOPHEN 650 MG/1
650 SUPPOSITORY RECTAL EVERY 6 HOURS PRN
Status: DISCONTINUED | OUTPATIENT
Start: 2022-12-25 | End: 2022-12-27 | Stop reason: HOSPADM

## 2022-12-25 RX ORDER — ONDANSETRON 2 MG/ML
4 INJECTION INTRAMUSCULAR; INTRAVENOUS EVERY 6 HOURS PRN
Status: DISCONTINUED | OUTPATIENT
Start: 2022-12-25 | End: 2022-12-27 | Stop reason: HOSPADM

## 2022-12-25 RX ORDER — FENTANYL CITRATE 50 UG/ML
75 INJECTION, SOLUTION INTRAMUSCULAR; INTRAVENOUS ONCE
Status: COMPLETED | OUTPATIENT
Start: 2022-12-25 | End: 2022-12-25

## 2022-12-25 RX ORDER — ONDANSETRON 2 MG/ML
4 INJECTION INTRAMUSCULAR; INTRAVENOUS EVERY 6 HOURS PRN
Status: DISCONTINUED | OUTPATIENT
Start: 2022-12-25 | End: 2022-12-25

## 2022-12-25 RX ORDER — DULOXETIN HYDROCHLORIDE 20 MG/1
20 CAPSULE, DELAYED RELEASE ORAL DAILY
Status: DISCONTINUED | OUTPATIENT
Start: 2022-12-25 | End: 2022-12-27 | Stop reason: HOSPADM

## 2022-12-25 RX ORDER — SODIUM CHLORIDE 0.9 % (FLUSH) 0.9 %
5-40 SYRINGE (ML) INJECTION EVERY 12 HOURS SCHEDULED
Status: DISCONTINUED | OUTPATIENT
Start: 2022-12-25 | End: 2022-12-27 | Stop reason: HOSPADM

## 2022-12-25 RX ADMIN — INSULIN HUMAN 10 UNITS: 100 INJECTION, SOLUTION PARENTERAL at 06:27

## 2022-12-25 RX ADMIN — CALCIUM GLUCONATE 1000 MG: 20 INJECTION, SOLUTION INTRAVENOUS at 18:59

## 2022-12-25 RX ADMIN — ONDANSETRON HYDROCHLORIDE 4 MG: 2 INJECTION, SOLUTION INTRAMUSCULAR; INTRAVENOUS at 05:12

## 2022-12-25 RX ADMIN — Medication 1000 ML/HR: at 17:05

## 2022-12-25 RX ADMIN — FUROSEMIDE 60 MG: 10 INJECTION, SOLUTION INTRAMUSCULAR; INTRAVENOUS at 05:16

## 2022-12-25 RX ADMIN — Medication 2000 ML/HR: at 11:45

## 2022-12-25 RX ADMIN — METOPROLOL SUCCINATE 100 MG: 50 TABLET, EXTENDED RELEASE ORAL at 19:58

## 2022-12-25 RX ADMIN — INSULIN GLARGINE 16 UNITS: 100 INJECTION, SOLUTION SUBCUTANEOUS at 20:03

## 2022-12-25 RX ADMIN — Medication 2000 ML/HR: at 17:05

## 2022-12-25 RX ADMIN — INSULIN LISPRO 6 UNITS: 100 INJECTION, SOLUTION INTRAVENOUS; SUBCUTANEOUS at 17:00

## 2022-12-25 RX ADMIN — QUETIAPINE FUMARATE 50 MG: 25 TABLET ORAL at 19:58

## 2022-12-25 RX ADMIN — FENTANYL CITRATE 75 MCG: 50 INJECTION, SOLUTION INTRAMUSCULAR; INTRAVENOUS at 05:12

## 2022-12-25 RX ADMIN — DEXTROSE MONOHYDRATE 250 ML: 100 INJECTION, SOLUTION INTRAVENOUS at 06:27

## 2022-12-25 RX ADMIN — Medication 500 ML/HR: at 11:45

## 2022-12-25 RX ADMIN — Medication 10 ML: at 16:58

## 2022-12-25 RX ADMIN — OXYCODONE AND ACETAMINOPHEN 1 TABLET: 5; 325 TABLET ORAL at 11:58

## 2022-12-25 RX ADMIN — PRAVASTATIN SODIUM 40 MG: 40 TABLET ORAL at 16:53

## 2022-12-25 RX ADMIN — INSULIN LISPRO 4 UNITS: 100 INJECTION, SOLUTION INTRAVENOUS; SUBCUTANEOUS at 20:03

## 2022-12-25 RX ADMIN — Medication 2000 ML/HR: at 14:30

## 2022-12-25 RX ADMIN — OXYCODONE AND ACETAMINOPHEN 1 TABLET: 5; 325 TABLET ORAL at 19:36

## 2022-12-25 RX ADMIN — APIXABAN 2.5 MG: 2.5 TABLET, FILM COATED ORAL at 19:59

## 2022-12-25 RX ADMIN — Medication 1000 ML/HR: at 11:45

## 2022-12-25 RX ADMIN — SODIUM ZIRCONIUM CYCLOSILICATE 10 G: 10 POWDER, FOR SUSPENSION ORAL at 06:45

## 2022-12-25 RX ADMIN — DULOXETINE HYDROCHLORIDE 20 MG: 20 CAPSULE, DELAYED RELEASE ORAL at 16:52

## 2022-12-25 RX ADMIN — METOPROLOL SUCCINATE 100 MG: 50 TABLET, EXTENDED RELEASE ORAL at 16:53

## 2022-12-25 RX ADMIN — VANCOMYCIN HYDROCHLORIDE 2000 MG: 10 INJECTION, POWDER, LYOPHILIZED, FOR SOLUTION INTRAVENOUS at 07:34

## 2022-12-25 RX ADMIN — DILTIAZEM HYDROCHLORIDE 2.5 MG/HR: 5 INJECTION, SOLUTION INTRAVENOUS at 05:52

## 2022-12-25 RX ADMIN — FENTANYL CITRATE 50 MCG: 50 INJECTION, SOLUTION INTRAMUSCULAR; INTRAVENOUS at 07:56

## 2022-12-25 RX ADMIN — DILTIAZEM HYDROCHLORIDE 10 MG: 5 INJECTION INTRAVENOUS at 05:16

## 2022-12-25 RX ADMIN — APIXABAN 2.5 MG: 2.5 TABLET, FILM COATED ORAL at 16:53

## 2022-12-25 RX ADMIN — CEFEPIME 2000 MG: 2 INJECTION, POWDER, FOR SOLUTION INTRAVENOUS at 06:24

## 2022-12-25 RX ADMIN — CLOPIDOGREL BISULFATE 75 MG: 75 TABLET ORAL at 16:53

## 2022-12-25 RX ADMIN — PIPERACILLIN AND TAZOBACTAM 4500 MG: 4; .5 INJECTION, POWDER, FOR SOLUTION INTRAVENOUS at 12:32

## 2022-12-25 RX ADMIN — PIPERACILLIN AND TAZOBACTAM 3375 MG: 3; .375 INJECTION, POWDER, LYOPHILIZED, FOR SOLUTION INTRAVENOUS at 20:05

## 2022-12-25 ASSESSMENT — PAIN DESCRIPTION - PAIN TYPE: TYPE: CHRONIC PAIN

## 2022-12-25 ASSESSMENT — PAIN SCALES - GENERAL
PAINLEVEL_OUTOF10: 9
PAINLEVEL_OUTOF10: 8
PAINLEVEL_OUTOF10: 4
PAINLEVEL_OUTOF10: 8

## 2022-12-25 ASSESSMENT — PAIN DESCRIPTION - ORIENTATION: ORIENTATION: LOWER

## 2022-12-25 ASSESSMENT — ENCOUNTER SYMPTOMS
SHORTNESS OF BREATH: 1
ABDOMINAL PAIN: 0
NAUSEA: 0
VOMITING: 0
BACK PAIN: 1

## 2022-12-25 ASSESSMENT — PAIN DESCRIPTION - LOCATION
LOCATION: BACK
LOCATION: BACK
LOCATION: TOE (COMMENT WHICH ONE)

## 2022-12-25 ASSESSMENT — PAIN DESCRIPTION - DESCRIPTORS: DESCRIPTORS: ACHING

## 2022-12-25 ASSESSMENT — PAIN - FUNCTIONAL ASSESSMENT: PAIN_FUNCTIONAL_ASSESSMENT: NONE - DENIES PAIN

## 2022-12-25 NOTE — ED NOTES
Report to Jarrod christie at [facility] ICU     Dyllan Horner, [address]  12/25/22 [id number]

## 2022-12-25 NOTE — H&P
Hospital Medicine History & Physical      PCP: Pcp No    Date of Admission: 12/25/2022    Date of Service: Pt seen/examined on 12/25/22 and Admitted to Inpatient with expected LOS greater than two midnights due to medical therapy. Chief Complaint:  SOB      History Of Present Illness:    45 y.o. male who presented to [facility] with SOB. Patient missed HD on Friday due to poor weather. This morning, he awoke with severe SOB. Patient wears 4L NC at baseline but turned it up to 6 and still felt very SOB. EMS was called and patient was placed on BIPAP en route to the ED. Patient has had a similar presentation several times related to pulmonary edema from missed HD.      Past Medical History:          Diagnosis Date    Ambulatory dysfunction     walker for long distances, SOB with distance    Aortic stenosis     echo 2017    Arthritis     hands and hips    Asthma     Bilateral hilar adenopathy syndrome 6/3/2013    CAD (coronary artery disease)     Dr. Tiffany Rehman [facility]) 04/19/2019    EF= 43%    CHF (congestive heart failure) ([address]     Chronic pain     COPD (chronic obstructive pulmonary disease) ([address]     pulmonology Dr. Carlos Davidson    Depression     Diabetes mellitus ([address]     borderline    Difficult intravenous access     Emphysema of lung ([address]     ESRD (end stage renal disease) on dialysis ([address]     MWF    Fear of needles     Gastric ulcer     GERD (gastroesophageal reflux disease)     Heart valve problem     bicuspic valve    Hemodialysis patient ([address]     History of spinal fracture     work incident    Hx of blood clots     Bilateral lower extremities; stents in place    Hyperlipidemia     Hypertension     MI (myocardial infarction) ([address] 2019    has had 9 MIs. 2019 was the last    Neuromuscular disorder ([address]     due to CVA    Numbness and tingling in left arm     from fistula    Pneumonia     PONV (postoperative nausea and vomiting)     Prolonged emergence from general anesthesia     States requires more medication than most people    Sleep apnea     Uses CPAP    Stroke ([facility]     7mm thalamic cva 2017 deficts left side, left side weakness    TIA (transient ischemic attack)     Unspecified diseases of blood and blood-forming organs        Past Surgical History:          Procedure Laterality Date    AORTIC VALVE REPLACEMENT N/A 10/15/2019    TRANSCATHETER AORTIC VALVE REPLACEMENT FEMORAL APPROACH performed by Jean Paul Landa MD at [address] [facility] Right 7/2/2019    PERITONEAL DIALYSIS CATHETER REMOVAL performed by Judi Braswell MD at [address]  2/29/2015    WN    CORONARY ANGIOPLASTY WITH STENT PLACEMENT  05/26/15    CYST REMOVAL  08/14/2013    EXCISION CYSTS, NECK X2 AND ABDOMINAL benign    DIAGNOSTIC CARDIAC CATH LAB PROCEDURE      DIALYSIS FISTULA CREATION Left 10/30/2017    LEFT BRACHIAL CEPHALIC FISTULA    DIALYSIS FISTULA CREATION Left 3/27/2019    LIGATION  AV FISTULA performed by Sher Morfin MD at [address], COLON, DIAGNOSTIC      IR TUNNELED CATHETER PLACEMENT GREATER THAN 5 YEARS  3/21/2022    IR TUNNELED CATHETER PLACEMENT GREATER THAN 5 YEARS 3/21/2022 [facility]    IR TUNNELED CATHETER PLACEMENT GREATER THAN 5 YEARS  4/21/2022    IR TUNNELED CATHETER PLACEMENT GREATER THAN 5 YEARS 4/21/2022 [facility]    IR TUNNELED CATHETER PLACEMENT GREATER THAN 5 YEARS  4/26/2022    IR TUNNELED CATHETER PLACEMENT GREATER THAN 5 YEARS 4/26/2022 [facility]    IR TUNNELED CATHETER PLACEMENT GREATER THAN 5 YEARS  6/23/2022    IR TUNNELED CATHETER PLACEMENT GREATER THAN 5 YEARS 6/23/2022 [facility]    OTHER SURGICAL HISTORY  02/01/2017    laparoscopic cholecystectomy with intraoperative cholangiogram    OTHER SURGICAL HISTORY  2018    PORT PLACEMENT  - vas cath    OTHER SURGICAL HISTORY Bilateral 06/26/2018    laprascopic peritoneal dialysis catheter placement    OTHER SURGICAL HISTORY Right 09/2018    peritoneal dialysis port placed on right side of abdomen    OTHER SURGICAL HISTORY  05/28/2019    PTA/Stenting R External Iliac artery    [place] LAP INSERTION TUNNELED INTRAPERITONEAL CATHETER N/A 9/21/2018    LAPAROSCOPIC PERITONEAL DIALYSIS CATHETER REPLACEMENT performed by Jamel Zaragoza MD at [address] 2/24/2022    PERINEAL ABCESS DRAINAGE performed by Jamel Zaragoza MD at [address] [place] [address] N/A 10/2/2022    THORACENTESIS ULTRASOUND performed by Iris Garcia MD at [address]  01/06/2016    UPPER GASTROINTESTINAL ENDOSCOPY  01/29/2017    possible candida, otherwise normal appearing    VASCULAR SURGERY  aprx 2 years ago    2 stents placed, each side of groin       Medications Prior to Admission:      Prior to Admission medications    Medication Sig Start Date End Date Taking? Authorizing Provider   docusate sodium (DOK) 100 MG capsule Take 1 capsule by mouth as needed for Constipation 12/13/22   Rain Garner DO   sennosides-docusate sodium (SENOKOT-S) 8.6-50 MG tablet Take 1 tablet by mouth as needed for Constipation 12/13/22   Rain Garner DO   apixaban (ELIQUIS) 2.5 MG TABS tablet Take 1 tablet by mouth 2 times daily 11/1/22   Colonel Sam APRN - CNP   gabapentin (NEURONTIN) 100 MG capsule Take 2 capsules by mouth 3 times daily as needed (neuropathic pain) for up to 10 days.  10/20/22 10/30/22  Parris Edwards MD   metoprolol succinate (TOPROL XL) 100 MG extended release tablet Take 1 tablet by mouth in the morning and at bedtime 7/21/22   Juan Torres MD   cyclobenzaprine (FLEXERIL) 5 MG tablet Muscle spasms 7/21/22   Juan Torres MD   QUEtiapine (SEROQUEL) 50 MG tablet TAKE 1 TABLET BY MOUTH EVERY EVENING 6/30/22   Caty Lock MD   isosorbide dinitrate (ISORDIL) 20 MG tablet Take 1 tablet by mouth 3 times daily 6/8/22   Tom Ramirez PA   clopidogrel (PLAVIX) 75 MG tablet Take 1 tablet by mouth daily 5/9/22   JAY López CNP   pantoprazole (PROTONIX) 40 MG tablet TAKE 1 TABLET BY MOUTH EVERY MORNING BEFORE BREAKFAST 4/28/22   Andrey Hair MD   DULoxetine (CYMBALTA) 30 MG extended release capsule TAKE 1 CAPSULE BY MOUTH EVERY DAY 3/29/22   Andrey Hair MD   pravastatin (PRAVACHOL) 40 MG tablet Take 1 tablet by mouth daily 2/10/22   JAY López CNP   B Complex-C-Folic Acid (VIRT-CAPS) 1 MG CAPS TAKE 1 CAPSULE BY MOUTH EVERY DAY 9/20/21   Andrey Hair MD   Calcium Acetate, Phos Binder, 667 MG CAPS TAKE 1 CAPSULE BY MOUTH THREE TIMES DAILY WITH MEALS 8/12/21   Andrey Hair MD   nitroGLYCERIN (NITROSTAT) 0.4 MG SL tablet DISSOLVE 1 TABLET UNDER THE TONGUE AS NEEDED FOR CHEST PAIN EVERY 5 MINUTES UP TO 3 TIMES. IF NO RELIEF CALL 911. 1/7/21   Andrey Hair MD   vitamin D (ERGOCALCIFEROL) 68457 units CAPS capsule TK 1 C PO WEEKLY 6/2/19   Historical Provider, MD   Alcohol Swabs PADS USE AS DIRECTED 4/25/18   Luan Santillan MD   ipratropium-albuterol (DUONEB) 0.5-2.5 (3) MG/3ML SOLN nebulizer solution Inhale 3 mLs into the lungs every 6 hours as needed for Shortness of Breath 10/15/17   Elsa Reed MD   calcium carbonate (TUMS) 500 MG chewable tablet Take 1 tablet by mouth 3 times daily as needed for Heartburn. Historical Provider, MD       Allergies:  Morphine    Social History:      The patient currently lives at home    TOBACCO:   reports that he quit smoking about 2 years ago. His smoking use included cigarettes. He has a 16.50 pack-year smoking history. He has never used smokeless tobacco.  ETOH:   reports that he does not currently use alcohol.   E-cigarette/Vaping       Questions Responses    E-cigarette/Vaping Use Never User    Start Date     Passive Exposure     Quit Date     Counseling Given     Comments               Family History:      Reviewed and negative in regards to presenting illness/complaint. Problem Relation Age of Onset    Diabetes Mother     Heart Disease Father     Kidney Disease Sister         stage 4-kidney failure    Cancer Sister     Heart Disease Sister     Obesity Sister     Cancer Sister     Heart Disease Sister     Obesity Sister     Alcohol Abuse Brother        REVIEW OF SYSTEMS COMPLETED:   Pertinent positives as noted in the HPI. All other systems reviewed and negative. PHYSICAL EXAM PERFORMED:    Pulse 60   Temp 98.3 °F (36.8 °C) (Oral)   Wt 232 lb 9.4 oz (105.5 kg)   SpO2 100%   BMI 34.35 kg/m²     General appearance:  Mild distress, appears stated age and cooperative. HEENT:  Normal cephalic, atraumatic without obvious deformity. Pupils equal, round, and reactive to light. Extra ocular muscles intact. Conjunctivae/corneas clear. Neck: Supple, with full range of motion. No jugular venous distention. Trachea midline. Respiratory:  Normal respiratory effort. Rales bilaterally without Wheezes/Rhonchi. Cardiovascular:  Regular rate and rhythm with normal S1/S2 without murmurs, rubs or gallops. Abdomen: Soft, non-tender, non-distended with normal bowel sounds. Musculoskeletal:  No clubbing, cyanosis. LE edema bilaterally. Full range of motion without deformity. Skin: Skin color, texture, turgor normal.  No rashes or lesions. Neurologic:  Neurovascularly intact without any focal sensory/motor deficits.  Cranial nerves: II-XII intact, grossly non-focal.  Psychiatric:  Alert and oriented, thought content appropriate, normal insight  Capillary Refill: Brisk,3 seconds, normal  Peripheral Pulses: +2 palpable, equal bilaterally       Labs:     Recent Labs     12/25/22  0500   WBC 16.0*   HGB 8.6*   HCT 25.9*        Recent Labs     12/25/22  0500 12/25/22  1112   * 124*   K 6.1* 8.0*   CL 91* 88*   CO2 22 19*   * 119*   CREATININE 9.0* 8.8*   CALCIUM 8.6 8.8   PHOS  --  3.5     Recent Labs     12/25/22  0500   AST 11*   ALT 11   BILITOT 0. 3   ALKPHOS 91     No results for input(s): INR in the last 72 hours. Recent Labs     12/25/22  0500   TROPONINI 0.15*       Urinalysis:      Lab Results   Component Value Date/Time    NITRU Negative 12/25/2022 05:00 AM    WBCUA 6-9 12/25/2022 05:00 AM    BACTERIA Rare 12/25/2022 05:00 AM    RBCUA 3-4 12/25/2022 05:00 AM    BLOODU SMALL 12/25/2022 05:00 AM    SPECGRAV 1.020 12/25/2022 05:00 AM    GLUCOSEU 500 12/25/2022 05:00 AM       Radiology:     CXR: I have reviewed the CXR with the following interpretation: right basilar opacity  EKG:  I have reviewed the EKG with the following interpretation: atrial tachycardia    CT HEAD WO CONTRAST    (Results Pending)       Consults:    IP CONSULT TO PULMONOLOGY  IP CONSULT TO PULMONOLOGY    ASSESSMENT:    Active Hospital Problems    Diagnosis Date Noted    Acute respiratory failure with hypoxemia ([address] [J96.01] 12/25/2022     Priority: Medium    Hyperkalemia [E87.5] 12/25/2022     Priority: Medium    Respiratory failure with hypoxia ([address] [J96.91] 11/29/2021         PLAN:  Acute on chronic systolic heart failure  - 2/2 poor HD compliance  - recent echo with EF 25-30%  - fluid managed with HD. Started on CRRT  - continue metoprolol. ACE/ARB contraindicated due to renal disease    Pneumonia  - no evidence of sepsis  - suspect gram negative  - blood cultures ordered  - continue zosyn    Hyperkalemia  - 2/2 ESRD  - nephrology consulted  - started on CRRT    Acute on chronic hypoxic and hypercarbic respiratory failure  - 2/2 pulmonary edema, pneumonia  - continue BIPAP  - wean O2 as able    ESRD  - poor compliance  - nephrology consulted  - started on CRRT    Hyponatremia  - nephrology consulted  - started on CRRT    DMII  - poorly controlled  - lantus with SSI ordered    CAD  - no active chest pain  - continue home plavix, statin, metoprolol    HLD  - continue statin    H/O DVT  - continue eliquis    Obesity  With Body mass index is 34.35 kg/m².  Complicating assessment and treatment. Placing patient at risk for multiple co-morbidities as well as early death and contributing to the patient's presentation. Counseled on weight loss. DVT Prophylaxis: SQH  Diet: Diet NPO  Code Status: Full Code    PT/OT Eval Status: not ordered    Dispo - ICU       Essie Gruber MD    Thank you Pcp No for the opportunity to be involved in this patient's care. If you have any questions or concerns please feel free to contact me at [phone].

## 2022-12-25 NOTE — ED PROVIDER NOTES
Emergency Department Attending Physician Note  Location: [place] [address] ED  12/25/2022       Pt Name: Fadi Meza  MRN: [id number]  [place] 1970    Date of evaluation: 12/25/2022  Provider: Kenneth Trejo DO  PCP: Pcp No    Note Started: 4:54 AM EST 12/25/22    CHIEF COMPLAINT  Chief Complaint   Patient presents with    Respiratory Distress     COPD exas. Pt is a dialyses pt who missed Friday due to weather. Got duoneb and solumedrol en route. Was 80% when EMS found him. Arrives on bipap. HISTORY OF PRESENT ILLNESS:  History obtained by patient. Limitations to history : Acuity of condition . Fadi Meza is a 45 y.o. male with a significant PMHx of CAD, CHF, cardiomyopathy, most recent EKG with EF now of 25-35% (see echo below), atrial fibrillation on Eliquis, aortic stenosis, end-stage renal disease on dialysis Monday Wednesday Friday, diabetes, and other comorbidities as listed below, the presents emergency department today with no dialysis since Wednesday, 12/21 because of the weather, shortness of breath, arrives emergency department today on CPAP. Patient wears 4 L nasal cannula at home at baseline. Says he woke up out of a dead sleep this morning at 4 AM, approximately an hour prior to arrival, with shortness of breath and feeling like he could not breathe. Patient unfortunately does not know much about his medical history; initially tells me he does not have A. fib when clearly documented he does. Does not know if he is on a blood thinner, states \"I do not know what that is. \"  Says he has been having some fevers, but this does not feel like a pneumonia and \"this does not feel like a missed session. \"  Otherwise, history is limited secondary to patient's acuity of condition and somewhat lack of insight into his medical problems. Per EMS, they gave him Solu-Medrol in route for suspected COPD exacerbation without improvement.   He arrives emergency department on CPAP.    On chart review, patient was admitted at this hospital from 12/4/2022 to 12/14/2022 with acute on chronic respiratory failure with hypoxia, had also just been admitted prior to that admission as well. Frequently admitted for fluid overload and missed dialysis. Has a chronic left-sided pleural effusion, paracentesis performed last admission. ECHO 12/04/2022:    Conclusions   Summary   Limited only f/u for LVEF and pericardial effusion. The left ventricular systolic function is severely reduced with an ejection   fraction of 25- 30%. Changes noted from previous echo on 9- in left ventricular function. There is a trivial to small localized near left and right ventricle   pericardial effusion noted. No tamponade physiology. Also cath lab on 12/11/2022:   CONCLUSIONS:    Mild to moderately increased filling pressures  Patent RCA stent with moderate to borderline severe in-stent restenosis  Severe circumflex and D1 stenosis  Continue medical management, consider outpatient high risk PCI of above territories pending outpatient clinical follow-up. Currently medical management seems best given comorbidities and need for additional fluid removal.  If PCI is pursued, will likely need general anesthesia. Patient had difficulty lying still on Cath Lab table. Okay to resume Eliquis tomorrow, case/plan/findings discussed with patient and wife, they understand/agree, they stated it is incorrectly stated in chart the patient would refuse blood, he is okay to receive blood products if needed. Nursing Notes were all reviewed and agreed with or any disagreements were addressed in the HPI.     MEDICAL HISTORY  Past Medical History:   Diagnosis Date    Ambulatory dysfunction     walker for long distances, SOB with distance    Aortic stenosis     echo 2017    Arthritis     hands and hips    Asthma     Bilateral hilar adenopathy syndrome 6/3/2013    CAD (coronary artery disease)     Dr. Kameron Lez[facility]    Cardiomyopathy [facility]) 04/19/2019    EF= 43%    CHF (congestive heart failure) (HCC)     Chronic pain     COPD (chronic obstructive pulmonary disease) ([facility]     pulmonology Dr. Thelma Strong    Depression     Diabetes mellitus ([address]     borderline    Difficult intravenous access     Emphysema of lung ([address]     ESRD (end stage renal disease) on dialysis ([address]     MWF    Fear of needles     Gastric ulcer     GERD (gastroesophageal reflux disease)     Heart valve problem     bicuspic valve    Hemodialysis patient ([address]     History of spinal fracture     work incident    Hx of blood clots     Bilateral lower extremities; stents in place    Hyperlipidemia     Hypertension     MI (myocardial infarction) ([address] 2019    has had 9 MIs. 2019 was the last    Neuromuscular disorder ([facility]     due to CVA    Numbness and tingling in left arm     from fistula    Pneumonia     PONV (postoperative nausea and vomiting)     Prolonged emergence from general anesthesia     States requires more medication than most people    Sleep apnea     Uses CPAP    Stroke ([facility]     7mm thalamic cva 2017 deficts left side, left side weakness    TIA (transient ischemic attack)     Unspecified diseases of blood and blood-forming organs         SURGICAL HISTORY  Past Surgical History:   Procedure Laterality Date    AORTIC VALVE REPLACEMENT N/A 10/15/2019    TRANSCATHETER AORTIC VALVE REPLACEMENT FEMORAL APPROACH performed by Bert Contreras MD at [address] [facility] Right 7/2/2019    PERITONEAL DIALYSIS CATHETER REMOVAL performed by August Ashraf MD at [address] [place] [address]  2/29/2015    WN    CORONARY ANGIOPLASTY WITH STENT PLACEMENT  05/26/15    CYST REMOVAL  08/14/2013    EXCISION CYSTS, NECK X2 AND ABDOMINAL benign    DIAGNOSTIC CARDIAC CATH LAB PROCEDURE      DIALYSIS FISTULA CREATION Left 10/30/2017    LEFT BRACHIAL CEPHALIC FISTULA    DIALYSIS FISTULA CREATION Left 3/27/2019    LIGATION  AV FISTULA performed by Nicki Brumfield MD at [address] [facility], COLON, DIAGNOSTIC      IR TUNNELED [address] 5 YEARS  3/21/2022    IR TUNNELED CATHETER PLACEMENT GREATER THAN 5 YEARS 3/21/2022 [facility]    IR TUNNELED CATHETER PLACEMENT GREATER THAN 5 YEARS  4/21/2022    IR TUNNELED CATHETER PLACEMENT GREATER THAN 5 YEARS 4/21/2022 [facility]    IR TUNNELED CATHETER PLACEMENT GREATER THAN 5 YEARS  4/26/2022    IR TUNNELED CATHETER PLACEMENT GREATER THAN 5 YEARS 4/26/2022 [facility]    IR TUNNELED CATHETER PLACEMENT GREATER THAN 5 YEARS  6/23/2022    IR TUNNELED CATHETER PLACEMENT GREATER THAN 5 YEARS 6/23/2022 [facility]    OTHER SURGICAL HISTORY  02/01/2017    laparoscopic cholecystectomy with intraoperative cholangiogram    OTHER SURGICAL HISTORY  2018    PORT PLACEMENT  - vas cath    OTHER SURGICAL HISTORY Bilateral 06/26/2018    laprascopic peritoneal dialysis catheter placement    OTHER SURGICAL HISTORY Right 09/2018    peritoneal dialysis port placed on right side of abdomen    OTHER SURGICAL HISTORY  05/28/2019    PTA/Stenting R External Iliac artery    [place] LAP INSERTION TUNNELED INTRAPERITONEAL CATHETER N/A 9/21/2018    LAPAROSCOPIC PERITONEAL DIALYSIS CATHETER REPLACEMENT performed by Anel Rodgers MD at [address] 2/24/2022    PERINEAL ABCESS DRAINAGE performed by Anel Rodgers MD at [address] [place] [address] N/A 10/2/2022    THORACENTESIS ULTRASOUND performed by Sumeet Jewell MD at [address] [place] [address]  01/06/2016    UPPER GASTROINTESTINAL ENDOSCOPY  01/29/2017    possible candida, otherwise normal appearing    VASCULAR SURGERY  aprx 2 years ago    2 stents placed, each side of groin       CURRENT MEDICATIONS  Previous Medications    ALCOHOL SWABS PADS    USE AS DIRECTED    APIXABAN (ELIQUIS) 2.5 MG TABS TABLET    Take 1 tablet by mouth 2 times daily    B COMPLEX-C-FOLIC ACID (VIRT-CAPS) 1 MG CAPS    TAKE 1 CAPSULE BY MOUTH EVERY DAY    CALCIUM ACETATE, PHOS BINDER, 667 MG CAPS    TAKE 1 CAPSULE BY MOUTH THREE TIMES DAILY WITH MEALS    CALCIUM CARBONATE (TUMS) 500 MG CHEWABLE TABLET    Take 1 tablet by mouth 3 times daily as needed for Heartburn. CLOPIDOGREL (PLAVIX) 75 MG TABLET    Take 1 tablet by mouth daily    CYCLOBENZAPRINE (FLEXERIL) 5 MG TABLET    Muscle spasms    DOCUSATE SODIUM (DOK) 100 MG CAPSULE    Take 1 capsule by mouth as needed for Constipation    DULOXETINE (CYMBALTA) 30 MG EXTENDED RELEASE CAPSULE    TAKE 1 CAPSULE BY MOUTH EVERY DAY    GABAPENTIN (NEURONTIN) 100 MG CAPSULE    Take 2 capsules by mouth 3 times daily as needed (neuropathic pain) for up to 10 days. IPRATROPIUM-ALBUTEROL (DUONEB) 0.5-2.5 (3) MG/3ML SOLN NEBULIZER SOLUTION    Inhale 3 mLs into the lungs every 6 hours as needed for Shortness of Breath    ISOSORBIDE DINITRATE (ISORDIL) 20 MG TABLET    Take 1 tablet by mouth 3 times daily    METOPROLOL SUCCINATE (TOPROL XL) 100 MG EXTENDED RELEASE TABLET    Take 1 tablet by mouth in the morning and at bedtime    NITROGLYCERIN (NITROSTAT) 0.4 MG SL TABLET    DISSOLVE 1 TABLET UNDER THE TONGUE AS NEEDED FOR CHEST PAIN EVERY 5 MINUTES UP TO 3 TIMES. IF NO RELIEF CALL 911.     PANTOPRAZOLE (PROTONIX) 40 MG TABLET    TAKE 1 TABLET BY MOUTH EVERY MORNING BEFORE BREAKFAST    PRAVASTATIN (PRAVACHOL) 40 MG TABLET    Take 1 tablet by mouth daily    QUETIAPINE (SEROQUEL) 50 MG TABLET    TAKE 1 TABLET BY MOUTH EVERY EVENING    SENNOSIDES-DOCUSATE SODIUM (SENOKOT-S) 8.6-50 MG TABLET    Take 1 tablet by mouth as needed for Constipation    VITAMIN D (ERGOCALCIFEROL) 00559 UNITS CAPS CAPSULE    TK 1 C PO WEEKLY       ALLERGIES  Morphine    FAMILYHISTORY  Family History   Problem Relation Age of Onset    Diabetes Mother     Heart Disease Father     Kidney Disease Sister         stage 4-kidney failure    Cancer Sister     Heart Disease Sister     Obesity Sister     Cancer Sister     Heart Disease Sister     Obesity Sister     Alcohol Abuse Brother        SOCIAL HISTORY  Social History     Tobacco Use    Smoking status: Former     Packs/day: 0.50     Years: 33.00     Pack years: 16.50     Types: Cigarettes     Quit date: 2020     Years since quittin.6    Smokeless tobacco: Never    Tobacco comments:     States quit 2021   Vaping Use    Vaping Use: Never used   Substance Use Topics    Alcohol use: Not Currently     Alcohol/week: 0.0 standard drinks     Comment: occ    Drug use: No       SCREENINGS    Robert Coma Scale  Eye Opening: Spontaneous  Best Verbal Response: Oriented  Best Motor Response: Obeys commands  Earth Coma Scale Score: 15       CIWA Assessment  BP: (!) 150/86  Heart Rate: 90             REVIEW OF SYSTEMS:    Review of Systems   Constitutional:  Positive for fever. Respiratory:  Positive for shortness of breath. Gastrointestinal:  Negative for abdominal pain, nausea and vomiting. Musculoskeletal:  Positive for back pain (chronic). Skin:  Positive for wound (toes). Negative for rash. Neurological:  Negative for headaches. Positives and Pertinent negatives as per HPI. PHYSICAL EXAM:     Vitals:    22 0553 22 0554 22 0555 22 0556   BP:       Pulse: 90 88 87 90   Resp: 21 25 28 26   SpO2: 100% 100% 100% 100%   Weight:           Physical Exam  Constitutional:       General: He is in acute distress. Appearance: Normal appearance. He is normal weight. He is ill-appearing. He is not diaphoretic. HENT:      Head: Normocephalic and atraumatic. Right Ear: External ear normal.      Left Ear: External ear normal.      Nose: Nose normal.      Mouth/Throat:      Mouth: Mucous membranes are moist.      Pharynx: Oropharynx is clear. Eyes:      General:         Right eye: No discharge. Left eye: No discharge.       Conjunctiva/sclera: Conjunctivae normal. Cardiovascular:      Rate and Rhythm: Regular rhythm. Tachycardia present. Comments: 130's-140's   Pulmonary:      Effort: Pulmonary effort is normal. No respiratory distress. Breath sounds: Normal breath sounds. No wheezing. Abdominal:      General: Abdomen is flat. Palpations: Abdomen is soft. Musculoskeletal:         General: No swelling or tenderness. Cervical back: Normal range of motion and neck supple. Right lower leg: Edema (1+) present. Left lower leg: Edema (1+) present. Skin:     General: Skin is warm and dry. Coloration: Skin is not jaundiced. Findings: No rash. Neurological:      General: No focal deficit present. Mental Status: He is alert and oriented to person, place, and time. Cranial Nerves: No cranial nerve deficit. Sensory: No sensory deficit. Psychiatric:         Mood and Affect: Mood normal.         Thought Content:  Thought content normal.         DIAGNOSTIC RESULTS:  LABS:    Labs Reviewed   CBC WITH AUTO DIFFERENTIAL - Abnormal; Notable for the following components:       Result Value    WBC 16.0 (*)     RBC 2.88 (*)     Hemoglobin 8.6 (*)     Hematocrit 25.9 (*)     RDW 16.6 (*)     Neutrophils Absolute 14.1 (*)     Lymphocytes Absolute 0.6 (*)     All other components within normal limits   COMPREHENSIVE METABOLIC PANEL W/ REFLEX TO MG FOR LOW K - Abnormal; Notable for the following components:    Potassium reflex Magnesium 6.1 (*)     Chloride 91 (*)     Glucose 340 (*)      (*)     Creatinine 9.0 (*)     Est, Glom Filt Rate 6 (*)     Albumin/Globulin Ratio 1.0 (*)     AST 11 (*)     All other components within normal limits    Narrative:     CALL  [facility] tel. [phone],  Chemistry results called to and read back by Jamia Grigsby, 12/25/2022 05:57,  by JUSTIN  Chemistry results called to and read back by Jamia Grigsby, 12/25/2022 05:57,  by JUSTIN   TROPONIN - Abnormal; Notable for the following components: Troponin 0.15 (*)     All other components within normal limits    Narrative:     Gera Arceo tel. [phone],  Chemistry results called to and read back by Juve Palmer, 12/25/2022 05:57,  by JUSTIN  Chemistry results called to and read back by Juve Palmer, 12/25/2022 05:57,  by [address] - Abnormal; Notable for the following components:    Pro-BNP >70,000 (*)     All other components within normal limits    Narrative:     Gera Arceo tel. [phone],  Chemistry results called to and read back by Juve Palmer, 12/25/2022 05:57,  by JUSTIN  Chemistry results called to and read back by Juve Palmer, 12/25/2022 05:57,  by JUSTIN   BLOOD GAS, VENOUS - Abnormal; Notable for the following components:    pH, Blade 7.291 (*)     pO2, Blade 59.7 (*)     HCO3, Venous 21.4 (*)     Base Excess, Blade -5.0 (*)     Carboxyhemoglobin 1.7 (*)     All other components within normal limits   COVID-19 & INFLUENZA COMBO   CULTURE, BLOOD 1   CULTURE, BLOOD 2   URINALYSIS WITH REFLEX TO CULTURE   LACTATE, SEPSIS   LACTATE, SEPSIS       When ordered, only abnormal lab results are displayed. All other labs were within normal range or not returned as of this dictation. EKG: EKG obtained today in the emergency department shows tachycardia 133, reads SVT, however on the monitor appears much more irregular. History of A. fib. No discernible P waves. No ST segment elevation, ST segment pression, hyperacute T waves. However due to acuity of condition and breathing pattern, is somewhat motion artifact. Previous EKG had been sinus tachycardia in December 2 2022.       RADIOLOGY:    Plain radiographic images are visualized and preliminarily interpreted by the ED Provider with the below findings: Right lower lobe opacity sitting then previous chest x-ray, however left pleural effusion appears similar    Non-plain film images such as CT, Ultrasound and MRI are read by the radiologist. Interpretation per the Radiologist below, if available at the time of this note:  XR CHEST PORTABLE   Final Result   Increased right basilar infiltrate, otherwise similar appearing chest.             PROCEDURES:  Unless otherwise noted below, none. Procedures      MEDICATIONS:   Medications   dilTIAZem injection 10 mg (10 mg IntraVENous Given 12/25/22 0516)     Followed by   dilTIAZem 125 mg in dextrose 5 % 125 mL infusion (2.5 mg/hr IntraVENous New Bag 12/25/22 0552)   cefepime (MAXIPIME) 2000 mg IVPB minibag (has no administration in time range)   insulin regular (HUMULIN R;NOVOLIN R) injection 10 Units (has no administration in time range)   dextrose bolus 10% 250 mL (has no administration in time range)   fentaNYL (SUBLIMAZE) injection 75 mcg (75 mcg IntraVENous Given 12/25/22 0512)   ondansetron (ZOFRAN) injection 4 mg (4 mg IntraVENous Given 12/25/22 0512)   furosemide (LASIX) injection 60 mg (60 mg IntraVENous Given 12/25/22 0516)     _____________________________________    MEDICAL DECISION MAKING:     Patient is a 45 y.o. male with a significant PMHx of end-stage renal disease on dialysis, missed last session, CHF with an EF of 25%, history of A. fib on Eliquis, and recent fevers, presenting the emergency department today after a recent multiple admissions, with concerns of respiratory failure. Emergently placed on BiPAP, as patient is tachypneic, tachycardic, and hypertensive here in the ED. Initially, suspect CHF, however given fevers infectious process cannot be ruled out. Further, with A. fib with RVR, considered ACS. As patient has missed dialysis, will likely have electrolyte abnormalities as well. Initial management includes: diltiazem 10 mg bolus plus IV infusion, BiPAP, Lasix 60 mg IV push.     IN THE ED:     #a fib RVR   -Got diltiazem bolus and infusion, 60 mg IV Lasix in the setting of CHF for HRs in the 140's on arrival; A fib favored over SVT on EKG   -Placed on BiPAP on arrival to the ED, and has improved over the course of his initial hour in the ED  -6:07 AM HR now in the to the 90, much more comfortable    #hypoxic respiratory failure  -Multifactorial in this patient; left pleural effusion, CHF, A. fib with RVR, missed dialysis, and what appears to be a right lower lobe worsening opacity with leukocytosis and SIRS criteria  -Recent admissions to the ICU    #ESRD on HD  -Hyperkalemia 6.1, metabolic acidosis 9.11, uremia 128, will need dialysis emergency  -D50 and 10 units IV insulin given  -Discussed with nephrology (sees Dr. Alyssa Robertson) and they would like to add on [facility], and will have him arranged for dialysis today    #CHF exacerbation  -Requiring BiPAP, has pitting edema, missed dialysis, left pleural effusion, and BNP greater than 70,000 in his dialysis patient  -Improving with Lasix and BiPAP in the emergency department    #severe sepsis  -Right lower lobe worsening opacity, A. fib with RVR with heart rates in the 140s, tachypneic, with acute respiratory failure, septic shock criteria are met  -Initial lactate 1.9 6:44 AM  -With recent admissions, started on cefepime and vancomycin for hospital associated ammonia; blood cultures prior to antibiotics  -Blood pressure is stable, even with diltiazem, as I suspect there is a heart failure component  -Required ICU admission for this and above problems being emergent dialysis    #elevated troponin  -Likely multifactorial with A. fib with RVR, end-stage renal disease dialysis, and CHF  -Initial 0.15, repeat pending at the time of admission  -Some chest tightness, no significant ischemic changes on EKG    Patient prefers to go to [facility], usually is admitted at [facility], and as we have no ICU beds at [facility], I reached out to the [facility] hospitalist and his nephrologist on-call. As this patient requires further evaluation and management as above, this patient will be admitted to the hospital for subsequent care.  I spoke with  Morena Car who accepts patient for admission to the ICU. They are available to place admission orders. Arranging for nephrology hemodialysis today. NURY complete. Patient agreeable with plan. Although patient is on BiPAP, is much more hemodynamically stable for transport than he had arrived. CONSULTS:   IP CONSULT TO HOSPITALIST  PHARMACY TO DOSE VANCOMYCIN  IP CONSULT TO NEPHROLOGY      Is this patient to be included in the SEP-1 Core Measure due to severe sepsis or septic shock? Yes   SEP-1 CORE MEASURE DATA      Sepsis Criteria   Severe Sepsis Criteria   Septic Shock Criteria     Must be confirmed or suspected to move forward with diagnosis of sepsis. Must meet 2:    [] Temperature > 100.9 F (38.3 C)        or < 96.8 F (36 C)  [x] HR > 90  [x] RR > 20  [x] WBC > 12 or < 4 or 10% bands      AND:      [x] Infection Confirmed or        Suspected. Must meet 1:    [] Lactate > 2       or   [x] Signs of Organ Dysfunction:    - SBP < 90 or MAP < 65  - Altered mental status  - Creatinine > 2 or increased from      baseline  - Urine Output < 0.5 ml/kg/hr  - Bilirubin > 2  - INR > 1.5 (not anticoagulated)  - Platelets < 544,373  - Acute Respiratory Failure as     evidenced by new need for NIPPV     or mechanical ventilation      [] No criteria met for Severe Sepsis. Must meet 1:    [] Lactate > 4        or   [] SBP < 90 or MAP < 65 for at        least two readings in the first        hour after fluid bolus        administration      [] Vasopressors initiated (if hypotension persists after fluid resuscitation)        [x] No criteria met for Septic Shock.    Patient Vitals for the past 6 hrs:   BP Pulse Resp SpO2 Weight Weight Method Percent Weight Change   12/25/22 0449 (!) 196/119 (!) 156 27 94 % 220 lb (99.8 kg) Stated 0   12/25/22 0456 -- -- (!) 36 -- -- -- --   12/25/22 0458 -- (!) 143 (!) 32 100 % -- -- --   12/25/22 0505 (!) 170/127 (!) 132 (!) 33 98 % -- -- --   12/25/22 0515 -- (!) 146 -- 100 % -- -- --   12/25/22 0516 -- (!) 128 -- 100 % -- -- --   12/25/22 0517 -- (!) 124 -- 100 % -- -- --   12/25/22 0518 -- -- -- 100 % -- -- --   12/25/22 0519 -- (!) 118 -- 100 % -- -- --   12/25/22 0520 -- (!) 119 -- -- -- -- --   12/25/22 0521 -- (!) 124 -- -- -- -- --   12/25/22 0522 -- 70 -- -- -- -- --   12/25/22 0526 131/67 89 -- 98 % -- -- --   12/25/22 0532 126/63 -- -- 99 % -- -- --   12/25/22 0547 (!) 150/86 -- -- 100 % -- -- --   12/25/22 0548 -- -- -- 100 % -- -- --   12/25/22 0549 -- 77 17 100 % -- -- --   12/25/22 0550 -- -- -- 100 % -- -- --   12/25/22 0553 -- 90 21 100 % -- -- --   12/25/22 0554 -- 88 25 100 % -- -- --   12/25/22 0555 -- 87 28 100 % -- -- --   12/25/22 0556 -- 90 26 100 % -- -- --      Recent Labs     12/25/22  0500   WBC 16.0*   CREATININE 9.0*   BILITOT 0.3            Time Septic Shock Identified: 6:44 am    Fluid Resuscitation Rational: less than 30mL/kg because of a history of ESRD or stage IV/V CKD. Instead, none was ordered. More fluid initially would be potentially detrimental to the patient      Repeat lactate level: not indicated due to initial lactate < 2    CLINICAL IMPRESSION:     ICD-10-CM    1. Acute respiratory failure with hypoxia (HCC)  J96.01       2. Hyperkalemia  E87.5       3. ESRD (end stage renal disease) ([facility]  N18.6       4. Dialysis patient, noncompliant ([facility]  Z91.15       5. Recurrent left pleural effusion  J90       6. Pneumonia of right lower lobe due to infectious organism  J18.9       7. Atrial fibrillation with RVR (HCC)  I48.91           DISPOSITION:  I discussed the results, including any incidental findings, with patient and through shared decision making,  disposition today from the ED will be: Transfer to [facility] to ICU in guarded condition. Questions answered. They are agreeable to plan and express understanding of plan.        CRITICAL CARE:   Total critical care time is 45 minutes, which excludes separately billable procedures and updating family. Time spent is specifically for management of the presenting complaint and symptoms initially, direct bedside care, reevaluation, review of records, and consultation. There was a high probability of clinically significant life-threatening deterioration in the patient's condition, which required my urgent intervention. _____________________________________      Marlen Ross DO, am the primary attending of record and contributed the majority of evaluation and treatment of emergent care for this encounter. This chart was generated in part by using Dragon Dictation system and may contain errors related to that system including errors in grammar, punctuation, and spelling, as well as words and phrases that may be inappropriate. If there are any questions or concerns please feel free to contact the dictating provider for clarification.      TRAE ROSS DO (electronically signed)   [address] [facility] [address] [place]  12/25/22 7711

## 2022-12-25 NOTE — PLAN OF CARE
Problem: Safety - Adult  Goal: Free from fall injury  Outcome: Progressing Subjective   Patient brought to the ER with chest pain via EMS sublingual nitroglycerin had decreased the pain EKG appears abnormal especially in the septal leads with some reciprocal changes in inferior leads reviewing this EKG with cardiology      Chest Pain  Pain location:  Substernal area  Pain quality: aching and pressure    Pain radiates to:  Neck  Pain severity:  Severe  Onset quality:  Sudden  Timing:  Intermittent  Progression:  Partially resolved  Chronicity:  New  Context: not breathing, not drug use, not lifting, not movement, not raising an arm, not at rest and not stress    Relieved by:  Nothing  Worsened by:  Nothing  Ineffective treatments:  None tried  Associated symptoms: diaphoresis    Associated symptoms: no abdominal pain, no AICD problem, no altered mental status, no anxiety, no back pain, no claudication, no dizziness, no dysphagia, no fatigue, no fever, no headache, no lower extremity edema, no nausea, no near-syncope, no numbness, no orthopnea, no palpitations, no shortness of breath, no syncope, no vomiting and no weakness    Risk factors: high cholesterol    Risk factors: no aortic disease, no coronary artery disease, not male, no Marfan's syndrome and no prior DVT/PE        Review of Systems   Constitutional: Positive for diaphoresis. Negative for activity change, appetite change, chills, fatigue and fever.   HENT: Negative for congestion, drooling, ear pain, facial swelling, hearing loss, sinus pressure, sore throat and trouble swallowing.    Eyes: Negative.  Negative for discharge.   Respiratory: Negative for shortness of breath.    Cardiovascular: Positive for chest pain. Negative for palpitations, orthopnea, claudication, syncope and near-syncope.   Gastrointestinal: Negative for abdominal distention, abdominal pain, blood in stool, diarrhea, nausea and vomiting.   Endocrine: Negative.  Negative for cold intolerance, heat intolerance, polydipsia, polyphagia and polyuria.    Genitourinary: Negative.  Negative for dysuria, flank pain and urgency.   Musculoskeletal: Negative.  Negative for arthralgias, back pain, myalgias and neck stiffness.   Skin: Negative.  Negative for color change, pallor and rash.   Allergic/Immunologic: Negative.    Neurological: Negative.  Negative for dizziness, seizures, speech difficulty, weakness, numbness and headaches.   Hematological: Negative.  Negative for adenopathy.   All other systems reviewed and are negative.      Past Medical History:   Diagnosis Date   • Acid reflux    • ADHD (attention deficit hyperactivity disorder)    • Arthritis    • Breast injury     sliced on accident on left breast       Allergies   Allergen Reactions   • Wellbutrin [Bupropion] Hives   • Doxylamine-Dm Unknown - Low Severity       Past Surgical History:   Procedure Laterality Date   • CHOLECYSTECTOMY     • TUBAL ABDOMINAL LIGATION     • URETEROSCOPY LASER LITHOTRIPSY WITH STENT INSERTION Left 5/10/2021    Procedure: LEFT URETEROSCOPY LASER LITHOTRIPSY WITH STENT INSERTION;  Surgeon: Ronald Jacobo MD;  Location: [facility];  Service: Urology;  Laterality: Left;       History reviewed. No pertinent family history.    Social History     Socioeconomic History   • Marital status:    Tobacco Use   • Smoking status: Current Every Day Smoker     Packs/day: 1.00     Years: 45.00     Pack years: 45.00     Types: Cigarettes   • Smokeless tobacco: Never Used   Vaping Use   • Vaping Use: Never used   Substance and Sexual Activity   • Alcohol use: Not Currently     Comment: occ   • Drug use: Never   • Sexual activity: Defer           Objective   Physical Exam  Vitals and nursing note reviewed. Exam conducted with a chaperone present.   Constitutional:       General: She is not in acute distress.     Appearance: Normal appearance. She is well-developed. She is not toxic-appearing or diaphoretic.   HENT:      Head: Normocephalic and atraumatic.      Right Ear: External ear  normal.      Nose: Nose normal.      Mouth/Throat:      Mouth: Mucous membranes are moist.   Eyes:      Conjunctiva/sclera: Conjunctivae normal.      Pupils: Pupils are equal, round, and reactive to light.   Cardiovascular:      Rate and Rhythm: Normal rate and regular rhythm.      Chest Wall: PMI is not displaced.      Pulses: Normal pulses. No decreased pulses.      Heart sounds: Normal heart sounds. No murmur heard.  Pulmonary:      Effort: Pulmonary effort is normal. No tachypnea, accessory muscle usage or respiratory distress.      Breath sounds: Normal breath sounds. No stridor. No decreased breath sounds, wheezing, rhonchi or rales.   Chest:      Chest wall: No tenderness or crepitus.   Abdominal:      General: Bowel sounds are normal. There is no distension.      Palpations: Abdomen is soft.      Tenderness: There is no abdominal tenderness.   Musculoskeletal:         General: No swelling or tenderness. Normal range of motion.      Cervical back: Normal range of motion and neck supple. No rigidity.      Comments: Lower extremity exam bilaterally is unremarkable.  There is no right or left calf tenderness .  There is no palpable venous cord.  No obvious difference in the size of the legs.  No pitting edema.  The dorsalis pedis and posterior tibial femoral and popliteal pulses are palpable and +2 bilaterally.  Homans sign is negative   Skin:     General: Skin is warm.      Capillary Refill: Capillary refill takes less than 2 seconds.      Coloration: Skin is not jaundiced.      Findings: No erythema or rash.   Neurological:      General: No focal deficit present.      Mental Status: She is alert and oriented to person, place, and time. Mental status is at baseline.      Cranial Nerves: No cranial nerve deficit.      Motor: No weakness.      Coordination: Coordination normal.      Deep Tendon Reflexes: Reflexes are normal and symmetric. Reflexes normal.   Psychiatric:         Mood and Affect: Mood normal.          Procedures           ED Course  ED Course as of 03/16/22 0241   Wed Mar 16, 2022   0241 Patient is still having some chest pain does not appear in any acute distress EKG is abnormal I have discussed this with cardiology will call a code STEMI [TS]      ED Course User Index  [TS] Trenton Glover MD                                                 MDM  Number of Diagnoses or Management Options  Diagnosis management comments: Differential Diagnosis:  I considered chest wall pain, muscle strain, costochondritis, pleurisy, rib fracture, herpes zoster, cardiovascular etiology, myocardial infarction, intermediate coronary syndrome, unstable angina, angina, aortic dissection, pericarditis, pulmonary etiology, pulmonary embolism, pneumonia, pneumothorax, lung cancer, gastroesophageal reflux disease, esophagitis, esophageal spasm and gastrointestinal etiology as a possible cause of chest pain in this patient. This is a partial list of diagnoses considered.         Amount and/or Complexity of Data Reviewed  Clinical lab tests: ordered and reviewed  Tests in the radiology section of CPT®: ordered and reviewed  Tests in the medicine section of CPT®: reviewed and ordered    Risk of Complications, Morbidity, and/or Mortality  Presenting problems: moderate  Diagnostic procedures: moderate  Management options: high        Final diagnoses:   ST elevation myocardial infarction (STEMI), unspecified artery (HCC)       ED Disposition  ED Disposition     None          No follow-up provider specified.       Medication List      No changes were made to your prescriptions during this visit.          Trenton Glover MD  03/16/22 0241

## 2022-12-25 NOTE — PROGRESS NOTES
12/25/22 0456   NIV Type   $NIV $Daily Charge   Skin Assessment Clean, dry, & intact   Skin Protection for O2 Device No  (patient refuses the nose padding.)   NIV Started/Stopped (S)  On   Equipment Type V60   Mode Bilevel   Mask Type Full face mask   Mask Size Medium   Settings/Measurements   IPAP 16 cmH20   CPAP/EPAP 8 cmH2O   Vt (Measured) 761 mL   Rate Ordered 12   Resp (!) 36   FiO2  50 %   Mask Leak (lpm) 38 lpm   Comfort Level Good   Using Accessory Muscles No   SpO2 100   Patient's Home Machine No   Alarm Settings   Alarms On Y

## 2022-12-25 NOTE — PROGRESS NOTES
Pt transferred to ICU from [facility] ED. Pt received by [facility]. Pt transferred, chlorhexidine bath finished. 4 eye assessment done.

## 2022-12-25 NOTE — ED NOTES
Call placed to nephrology at the request of Dr. Haleigh Smith. At [phone]. Call also placed to transfer center for hospitalist at [facility] for ICU bed placement.      [facility] Mayo Efren  12/25/22 0895

## 2022-12-25 NOTE — CONSULTS
INPATIENT PULMONARY CRITICAL CARE CONSULT NOTE      Chief Complaint/Referring Provider:  Patient is being seen at the request of Dr. Rosa Asencio for a consultation for Respiratory failure     Presenting HPI: Patient was transferred from [facility] for increasing shortness of breath      As per admitting provider-52 y.o. male who presented to [facility] with SOB. Patient missed HD on Friday due to poor weather. This morning, he awoke with severe SOB. Patient wears 4L NC at baseline but turned it up to 6 and still felt very SOB. EMS was called and patient was placed on BIPAP en route to the ED. Patient has had a similar presentation several times related to pulmonary edema from missed HD. As per recent hospitalization --45 y.o. male who presented to [facility] with SOB. PMHx significant for ESRD, Chronic Systolic CHF, s/p TAVR, CAD, HTN, DM2, COPD. He has frequent hospitalizations d/t respiratory issues and non-compliance. He has a Care Plan in place. He was recently admitted here last week and treated for Pneumonia. He continues HD as scheduled as an outpatient. He visited the ED on 12/2/22 with SOB, although symptoms improved with IV Lasix. He felt well at home on Saturday. However, he now presents after awakening with worsening SOB. He took off his CPAP and tried using higher amount of O2 without improvement. Symptoms progressively worsened and he presented to the ED with respiratory distress. Started on BIPAP support. CXR did show some worsening patchy airspace disease on the right base, stable fluid and airspace disease on the left relative to 2 days prior.    Patient continues to be symptomatic and a pulmonary consult requested for the same  Patient states that he came because of increasing shortness of breath and left-sided chest pressure, patient did not have too much of expectoration or wheezing per se, patient states that he needs oxygen, patient states that when he does not get the oxygen he becomes panicky and he starts having more shortness of breath, patient did not have any fever or chills, patient states that he is compliant with his dialysis and patient states that his dry weight is maintained, patient did not have any indiscretion for that, patient states that his BiPAP machine is not working and he needs a new BiPAP machine, patient did not have any palpitations or diaphoresis, patient does not have any increasing abdominal pain nausea vomiting, no increasing leg edema, no confusion lethargy, no other pertinent review of system of concern  Patient had similar hospitalization a few months back and patient underwent thoracentesis at that time and 1.8 L of straw-colored fluid was removed at that time; no other issues    Patient when seen this morning states that with great difficulty here arranged transportation to have his dialysis done but when he reached [address] he was told that it is closing not be done, subsequently patient states that he started having increasing shortness of breath and was quite dyspneic, patient had cough but no expectoration, patient was not have any chest pain or palpitation, patient states that shortness of breath worsened which made him come to the hospital, patient did not have any fever or chills, patient did not have any abdominal symptoms of concern, patient did not give any more review of systems of concern because of dyspnea and being on BiPAP     Patient Active Problem List    Diagnosis Date Noted    Hypotension due to drugs 11/25/2017    Acute on chronic combined systolic and diastolic heart failure ([address]     Ischemic cardiomyopathy     Dyslipidemia     DM2 (diabetes mellitus, type 2) ([address] 03/04/2014    Bicuspid aortic valve 06/03/2013    CHF (congestive heart failure) ([address] 05/14/2013    CAD (coronary artery disease) 05/14/2013    PVD (peripheral vascular disease) ([address] 05/14/2013    Acute respiratory failure with hypoxemia ([address] 12/25/2022 Hyperkalemia 12/25/2022    Hyponatremia 98/58/9556    Metabolic acidemia 56/05/2706    Pulmonary infiltrate 12/25/2022    Leukocytosis 12/25/2022    Type 2 myocardial infarction ([address] 12/08/2022    Compression atelectasis 12/06/2022    Acute on chronic respiratory failure with hypoxia ([address] 12/04/2022    Acute pneumonia 11/25/2022    Hypervolemia 10/19/2022    Pericardial effusion 09/28/2022    HFrEF (heart failure with reduced ejection fraction) ([address] 09/24/2022    Acute respiratory failure with hypoxia and hypercapnia ([address] 09/23/2022    Hypertensive emergency 07/17/2022    SOB (shortness of breath) 07/17/2022    Altered mental status     Hypoglycemia 05/29/2022    Morbid obesity with BMI of 40.0-44.9, adult ([address] 04/26/2022    Former smoker 04/19/2022    Cardiomyopathy ([address] 04/19/2022    History of transcatheter aortic valve replacement (TAVR) 10/19/2019    Uncontrolled hypertension 11/14/2013    Asthma-COPD overlap syndrome ([address] 05/14/2013    Respiratory failure ([address] 04/18/2022    Pulmonary edema with diastolic CHF, NYHA class 3 ([address] 03/17/2022    [place]-rectal abscess     Somnolence     Atrial flutter ([address]     SIRS (systemic inflammatory response syndrome) ([address] 02/21/2022    NSTEMI (non-ST elevated myocardial infarction) ([address] 01/12/2022    Acute respiratory failure with hypercapnia ([address] 11/30/2021    Acute pulmonary edema ([address] 11/30/2021    Grade II diastolic dysfunction 12/44/4704    Shock circulatory ([address] 11/30/2021    Smoker 11/30/2021    Normocytic normochromic anemia 11/30/2021    Respiratory failure with hypoxia ([address] 11/29/2021    Speech problem     Urinary tract infection with hematuria     Acute CVA (cerebrovascular accident) ([address] 09/07/2021    Acute hypoxemic respiratory failure ([facility] 08/12/2021    Uncontrolled type 2 diabetes mellitus with hyperglycemia ([facility] 06/27/2021    Acute encephalopathy 06/27/2021    Cellulitis and abscess of hand 04/24/2021    Iliac artery occlusion, right ([facility] Cellulitis of right foot 01/29/2021    Peripheral vascular occlusive disease ([address] 01/02/2021    Ataxia     Weakness of both lower extremities 12/30/2020    Sinus bradycardia 12/30/2020    Sinus pause     GBS (Guillain-[place] syndrome) ([facility])     Bilateral leg weakness 12/04/2020    Bradycardia 10/19/2019    Syncope and collapse 10/19/2019    PAF (paroxysmal atrial fibrillation) ([address]     Hypercholesteremia 07/30/2019    Chronic bronchitis ([address] 05/21/2019    Nasal congestion 05/21/2019    Chronic, continuous use of opioids 04/15/2019    Steal syndrome of dialysis vascular access (HCC)     SOB (shortness of breath) on exertion 12/24/2018    Anemia of chronic disease 11/12/2018    Pulmonary edema 11/09/2018    Fluid overload 11/09/2018    Renovascular hypertension     Mixed hyperlipidemia     Cigarette nicotine dependence in remission     Neuromuscular disorder ([address]     Acute diastolic CHF (congestive heart failure) ([facility] 03/18/2018    Hemodialysis-associated hypotension 11/22/2017    ESRD (end stage renal disease) on dialysis ([address] 11/22/2017    Mucus plugging of bronchi     Nonrheumatic aortic valve stenosis     Pleural effusion on left     Chronic anemia     History of CVA (cerebrovascular accident)     Type 2 diabetes mellitus without complication, without long-term current use of insulin ([facility])     ZAINAB (obstructive sleep apnea)     Tobacco abuse 05/21/2017    CVA (cerebral vascular accident) ([address] 05/21/2017    Angina, class IV ([address]     Dyspnea     Gastroparesis due to DM ([address]     Biliary colic 57/21/0401    Symptomatic cholelithiasis 01/04/2017    Degeneration of lumbar or lumbosacral intervertebral disc 03/18/2016    Lumbar radiculopathy 03/18/2016    Lumbosacral spondylosis without myelopathy 03/18/2016    ZAINAB on CPAP 02/11/2016    Obesity (BMI 30-39. 9)     PNA (pneumonia) 03/28/2015    Depression with anxiety 06/05/2014    Passive smoke exposure 05/30/2014    Diabetic neuropathy ([address] 11/14/2013 Claudication in peripheral vascular disease ([address] 06/26/2013    Bilateral hilar adenopathy syndrome 06/03/2013    Sepsis without acute organ dysfunction ([address] 12/25/2012       Past Medical History:   Diagnosis Date    Ambulatory dysfunction     walker for long distances, SOB with distance    Aortic stenosis     echo 2017    Arthritis     hands and hips    Asthma     Bilateral hilar adenopathy syndrome 6/3/2013    CAD (coronary artery disease)     Dr. Amelie Leyva [facility]) 04/19/2019    EF= 43%    CHF (congestive heart failure) ([address]     Chronic pain     COPD (chronic obstructive pulmonary disease) ([address]     pulmonology Dr. Asha Magaña    Depression     Diabetes mellitus ([address]     borderline    Difficult intravenous access     Emphysema of lung ([address]     ESRD (end stage renal disease) on dialysis ([address]     MWF    Fear of needles     Gastric ulcer     GERD (gastroesophageal reflux disease)     Heart valve problem     bicuspic valve    Hemodialysis patient ([address]     History of spinal fracture     work incident    Hx of blood clots     Bilateral lower extremities; stents in place    Hyperlipidemia     Hypertension     MI (myocardial infarction) ([address] 2019    has had 9 MIs. 2019 was the last    Neuromuscular disorder ([address]     due to CVA    Numbness and tingling in left arm     from fistula    Pneumonia     PONV (postoperative nausea and vomiting)     Prolonged emergence from general anesthesia     States requires more medication than most people    Sleep apnea     Uses CPAP    Stroke ([address]     7mm thalamic cva 2017 deficts left side, left side weakness    TIA (transient ischemic attack)     Unspecified diseases of blood and blood-forming organs         Past Surgical History:   Procedure Laterality Date    AORTIC VALVE REPLACEMENT N/A 10/15/2019    TRANSCATHETER AORTIC VALVE REPLACEMENT FEMORAL APPROACH performed by Eliza Harkins MD at [address] [facility] Right 7/2/2019    PERITONEAL DIALYSIS CATHETER REMOVAL performed by Lanette Santos MD at [address]  2/29/2015    WNL    CORONARY ANGIOPLASTY WITH STENT PLACEMENT  05/26/15    CYST REMOVAL  08/14/2013    EXCISION CYSTS, NECK X2 AND ABDOMINAL benign    DIAGNOSTIC CARDIAC CATH LAB PROCEDURE      DIALYSIS FISTULA CREATION Left 10/30/2017    LEFT BRACHIAL CEPHALIC FISTULA    DIALYSIS FISTULA CREATION Left 3/27/2019    LIGATION  AV FISTULA performed by Suzanne Campuzano MD at [address] [facility], [place], DIAGNOSTIC      IR TUNNELED CATHETER PLACEMENT GREATER THAN 5 YEARS  3/21/2022    IR TUNNELED CATHETER PLACEMENT GREATER THAN 5 YEARS 3/21/2022 [facility]    IR TUNNELED CATHETER PLACEMENT GREATER THAN 5 YEARS  4/21/2022    IR TUNNELED CATHETER PLACEMENT GREATER THAN 5 YEARS 4/21/2022 [facility]    IR TUNNELED CATHETER PLACEMENT GREATER THAN 5 YEARS  4/26/2022    IR TUNNELED CATHETER PLACEMENT GREATER THAN 5 YEARS 4/26/2022 [facility]    IR TUNNELED CATHETER PLACEMENT GREATER THAN 5 YEARS  6/23/2022    IR TUNNELED CATHETER PLACEMENT GREATER THAN 5 YEARS 6/23/2022 [facility]    OTHER SURGICAL HISTORY  02/01/2017    laparoscopic cholecystectomy with intraoperative cholangiogram    OTHER SURGICAL HISTORY  2018    PORT PLACEMENT  - vas cath    OTHER SURGICAL HISTORY Bilateral 06/26/2018    laprascopic peritoneal dialysis catheter placement    OTHER SURGICAL HISTORY Right 09/2018    peritoneal dialysis port placed on right side of abdomen    OTHER SURGICAL HISTORY  05/28/2019    PTA/Stenting R External Iliac artery    ID LAP INSERTION TUNNELED INTRAPERITONEAL CATHETER N/A 9/21/2018    LAPAROSCOPIC PERITONEAL DIALYSIS CATHETER REPLACEMENT performed by Lanette Santos MD at [address] 2/24/2022    PERINEAL ABCESS DRAINAGE performed by Lanette Santos MD at [address] [place] [address] N/A 10/2/2022    THORACENTESIS ULTRASOUND performed by Fernandez Rogers MD at [address] [place] [address]  2016    UPPER GASTROINTESTINAL ENDOSCOPY  2017    possible candida, otherwise normal appearing    VASCULAR SURGERY  aprx 2 years ago    2 stents placed, each side of groin        Family History   Problem Relation Age of Onset    Diabetes Mother     Heart Disease Father     Kidney Disease Sister         stage 4-kidney failure    Cancer Sister     Heart Disease Sister     Obesity Sister     Cancer Sister     Heart Disease Sister     Obesity Sister     Alcohol Abuse Brother         Social History     Tobacco Use    Smoking status: Former     Packs/day: 0.50     Years: 33.00     Pack years: 16.50     Types: Cigarettes     Quit date: 2020     Years since quittin.6    Smokeless tobacco: Never    Tobacco comments:     States quit 2021   Substance Use Topics    Alcohol use: Not Currently     Alcohol/week: 0.0 standard drinks     Comment: occ        Allergies   Allergen Reactions    Morphine Nausea And Vomiting               Physical Exam:  Blood pressure (!) 142/77, pulse 76, temperature 98.8 °F (37.1 °C), temperature source Oral, resp. rate 26, weight 232 lb 9.4 oz (105.5 kg), SpO2 100 %.'     Constitutional: In significant  respiratory distress on BIPAP when seen   HENT: BIPAP mask intact  no thyromegaly. Eyes:  Conjunctivae are normal. Pupils equal, round, and reactive to light. No scleral icterus. Neck: . No tracheal deviation present. No obvious thyroid mass. Short large neck  Cardiovascular: Normal rate, regular rhythm, normal heart sounds. No right ventricular heave. some lower extremity edema. Pulmonary/Chest: No wheezes. significantl  bilateral rales. Chest wall is not dull to percussion. No accessory muscle usage or stridor. Decreased breath sound intensity  Abdominal: Soft. Bowel sounds present. No distension or hernia. No tenderness. Obese  Musculoskeletal: No cyanosis.  No resp failure, f/u cxr TECHNOLOGIST PROVIDED HISTORY: Reason for exam:->sob, resp failure, f/u cxr Reason for Exam: sob, resp failure, f/u cxr FINDINGS: Stable vascular catheter on the left. The heart is enlarged. Prior AVR. Relatively stable volume of a moderate left pleural effusion. Dense airspace opacification in the left lower lung zone. Improved aeration of the right lung. No significant skeletal finding. Stable left pleural effusion with associated airspace change in the left lower lobe. Improved aeration in the right lung. XR CHEST PORTABLE    Result Date: 12/25/2022  EXAMINATION: ONE XRAY VIEW OF THE CHEST 12/25/2022 5:27 am COMPARISON: 12/12/2022 HISTORY: ORDERING SYSTEM PROVIDED HISTORY: CHF rales, OB eval for effusions edema TECHNOLOGIST PROVIDED HISTORY: Reason for exam:->CHF rales, OB eval for effusions edema Reason for Exam: CHF rales, OB eval for effusions edema FINDINGS: Cardiac silhouette is enlarged. Tunneled dialysis catheter. Aortic valvular repair. Opacification of the left lung base. Increased right basilar infiltrate. Similar appearing left perihilar infiltrate. No pneumothorax. No acute osseous abnormality is identified. Increased right basilar infiltrate, otherwise similar appearing chest.      Latest Reference Range & Units 12/2/22 23:54 12/25/22 05:00   pH, Blade 7.350 - 7.450  7.429 7.291 (L)   pCO2, Blade 40.0 - 50.0 mmHg 42.8 45.4   pO2, Blade 25.0 - 40.0 mmHg 87.2 (H) 59.7 (H)   HCO3, Venous 23.0 - 29.0 mmol/L 27.7 21.4 (L)   TC02 (Calc), Blade Not Established mmol/L 29 23   Base Excess, Blade -3.0 - 3.0 mmol/L 3.1 (H) -5.0 (L)   MetHgb, Blade <1.5 % 0.3 0.3   O2 Sat, Blade Not Established % 96 88       Echocardiogram:Sept 2022-Summary   Patient refused use of Definity contrast.   Limited only f/u for LVEF and s/p TAVR. The left ventricular systolic function appears severely reduced with   visually estimated ejection fraction of 20-25%. Poor endocardial visualization. By history, there is a #29 mm Langford Leyda S3 bioprosthetic valve in the   aortic position. The valve appears well seated with no rocking motion. Valve   leaflets are not well-evaluated. Normal Doppler values. No evidence of aortic valve regurgitation. Pericardial effusion, noted darrick-apically where measures up to 1.5 cm,   anteriorly along right ventricle, with exudate. Appears likely trace along   lateral wall. Difficult to discern apically/laterally however, whether   pericardial or tracking pleural effusion. No tamponade physiology. There is a large left pleural effusion    Limited Echo in Dec 2022-Summary   Limited only f/u for LVEF and pericardial effusion. The left ventricular systolic function is severely reduced with an ejection   fraction of 25- 30%. Changes noted from previous echo on 9- in left ventricular function. There is a trivial to small localized near left and right ventricle   pericardial effusion noted. No tamponade physiology. PFT:PFT:2016-INDICATIONS:  COPD. 1.  SPIROMETRY:  The FEV-1 is 1.71 L which is 44% predicted. The FEV-1/FVC  ratio is normal.  Inhaled bronchodilators are given. There is significant  improvement in the FEV-1 at 29%. 2.  LUNG VOLUMES:  Total lung capacity is normal at 6.08 L or 86% predicted. 3.  DIFFUSION CAPACITY:  DLCO is 28.26 mL per minute per mmHg which is 79%  predicted. 4.  FLOW VOLUME LOOP:  Does not show an obvious obstructive pattern. No variable or fixed obstruction pattern on the flow volume loop done in 2016     Latest Reference Range & Units 12/25/22 05:00   INFLUENZA A NOT DETECTED  NOT DETECTED   INFLUENZA B NOT DETECTED  NOT DETECTED   SARS-CoV-2 RNA, RT PCR NOT DETECTED  NOT DETECTED       Assessment:  Principal Problem:    Acute respiratory failure with hypoxemia (HCC)  Active Problems:    Asthma-COPD overlap syndrome ([address]    Former smoker    Cardiomyopathy ([facility]    Acute on chronic respiratory failure with hypoxia (HCC)    Hyperkalemia    Hyponatremia    Metabolic acidemia    Pulmonary infiltrate    Leukocytosis    Obesity (BMI 30-39.9)    ZAINAB on CPAP    Chronic anemia    ESRD (end stage renal disease) on dialysis ([facility]    Uncontrolled type 2 diabetes mellitus with hyperglycemia ([facility]  Resolved Problems:    * No resolved hospital problems.  *          Plan:     BIPAP with oxygen supplementation  to keep saturation being 90-94% only  Please titrate the oxygen as per the above parameters  Patient was supposed to have urgent dialysis but went to have CRRT secondary to logistic issues as discussed with nephrology  Patient does have some new right pulmonary infiltrate with some leukocytosis  Patient has been started on IV Zosyn by IM-titration as per clinical status and cultures  Trend the WBC count  Pulmonary toilet  Beta-blockers along with Plavix to continue for cardiomyopathy  Lantus insulin has been started by IM-titration of the dosage as per blood glucose monitoring  BGM with SSI  No need for any systemic steroids from pulm standpoint review  Antihypertensives as per IM/nephrology   Bronchodilators on as needed basis  Patient has been started on Retacrit by nephrology  Consider IV Venofer if deemed appropriate  Trend hemodynamics and cardiac rhythm closely  Monitor respiratory status after the fluid removal  Monitor H&H closely  Monitor for any bleeding  Cymbalta and Seroquel as per IM to continue  Monitor for any hypoventilation and hypercarbia  PUD prophylaxis as per primary team    Case d/w ICU team        Electronically signed by:  Demetris Staton MD    12/25/2022    7:01 PM.

## 2022-12-25 NOTE — ED NOTES
Dr. Say Hurley spoke with Dr. Aurora Lopez and patient was accepted to [facility].      Grace Schwartz  12/25/22 0365

## 2022-12-25 NOTE — PROGRESS NOTES
Resumed care, CRRT started about 1145AM. Plan to keep on CRRT until HD tomorrow. Follow up labs this evening. Pt currently on 3L 02, pt states that he feels better and more to baseline.

## 2022-12-25 NOTE — CARE COORDINATION
Case Management Assessment  Initial Evaluation    Date/Time of Evaluation: 12/25/2022 12:06 PM  Assessment Completed by: NINO Moffett    If patient is discharged prior to next notation, then this note serves as note for discharge by case management. Patient Name: Julio Squires                   YOB: 1970  Diagnosis: Acute respiratory failure with hypoxemia ([address] [J96.01]  Respiratory failure with hypoxia ([address] [J96.91]  Hyperkalemia [E87.5]                   Date / Time: 12/25/2022  8:57 AM    Patient Admission Status: Inpatient   Readmission Risk (Low < 19, Mod (19-27), High > 27): Readmission Risk Score: 46.9    Current PCP: Pcp No  PCP verified by CM? Yes    Chart Reviewed: Yes      History Provided by: Patient  Patient Orientation: Alert and Oriented    Patient Cognition: Alert    Hospitalization in the last 30 days (Readmission):  Yes    If yes, Readmission Assessment in CM Navigator will be completed. Advance Directives:      Code Status: Full Code   Patient's Primary Decision Maker is: Legal Next of Kin    Primary Decision Maker: Crystal Ann - Spouse - [phone]    Secondary Decision Maker: Jacek Roman - Brother/Sister - [phone]    Discharge Planning:    Patient lives with: (P) Spouse/Significant Other Type of Home: (P) House  Primary Care Giver: Self  Patient Support Systems include: Spouse/Significant Other   Current Financial resources:    Current community resources: ECF/Home Care  Current services prior to admission: (P) C-pap            Current DME:              Type of Home Care services:  (P) Skilled Therapy    ADLS  Prior functional level: Independent in ADLs/IADLs  Current functional level: Independent in ADLs/IADLs    PT AM-PAC:   /24  OT AM-PAC:   /24    Family can provide assistance at DC: Yes  Would you like Case Management to discuss the discharge plan with any other family members/significant others, and if so, who?  No  Plans to Return to Present Housing: Yes  Other Identified Issues/Barriers to RETURNING to current housing: weakness, debility, noncompliance  Potential Assistance needed at discharge:              Potential DME:    Patient expects to discharge to:    Plan for transportation at discharge:      Financial    Payor: Jamie Engel / Plan: Phong [employer] / Product Type: *No Product type* /     Does insurance require precert for SNF: Yes    Potential assistance Purchasing Medications: (P) No  Meds-to-Beds request:        [address] [facility], [address] [facility] [address] [phone] - F [phone]  [address]  Phone: [phone] Fax: [phone]    [facility] 80, [address] [facility] [phone]  [address] [facility] 25[address] [facility]  Phone: [phone] Fax: 51 [facility] 9W, [address] [phone] - F [phone]  [address] [place] [address]  Phone: [phone] Fax: [phone]    [facility] 32-36 [facility], [address] [place], [place] [address] [phone] Fanny Alcaraz [address] [address]  Phone: [phone] Fax: [phone]      Notes:    Factors facilitating achievement of predicted outcomes: Motivated, Pleasant, Has needed Durable Medical Equipment at home, and Knowledge about rehab    Barriers to discharge: Limited family support, Impulsivity, and Medical complications    Additional Case Management Notes: Referred to patient for d/c planning. Patient well known from previous admissions. Patient is a 45year old male admitted for respiratory failure. Patient usually lives at home with wife. Patient reports he is independent in ADLs. Patient reports he is independent in most ADLs. Wife able to assist as needed. Patient plans to return home on d/c. States he went to HD Friday and was told they were closed.   Patient reports [facility] is not coming to home. Will make referral again. No other needs at this time. Declines SNF    The Plan for Transition of Care is related to the following treatment goals of Acute respiratory failure with hypoxemia ([address] [J96.01]  Respiratory failure with hypoxia ([address] [J96.91]  Hyperkalemia [U36.9]    IF APPLICABLE: The Patient and/or patient representative Sobia Solomon and his family were provided with a choice of provider and agrees with the discharge plan. Freedom of choice list with basic dialogue that supports the patient's individualized plan of care/goals and shares the quality data associated with the providers was provided to:     Patient Representative Name:       The Patient and/or Patient Representative Agree with the Discharge Plan?       NINO Chapa, SALVADOR-s  Case Management Department  Ph: [phone] Fax: [phone]

## 2022-12-25 NOTE — ED NOTES
Nephrology returned call and speaking with Dr. Lyndsey Hyde at this time.      Stephanie Schwartz  12/25/22 9961

## 2022-12-25 NOTE — ED NOTES
0737-Call back received by this writer from [facility] spoke with [facility]. \"Evi states [address] [place] [address] has no ground MICU transport avalible today but does have Air transport. Asked this writer to speak with physician if patient can go by [employer] due to no MICU availability. \" This writer spoke with Dr. Maggi James about the situation. Dr. Maggi James gave clearance and credentials for Medical Air transport to [facility] with [facility] care. This [facility] that Dr. Maggi James gave clearance for transport. Evi RN stated she would notify [address] [place] [address] dispatch to have Air care sent to our facility. 0739-Evi RN [facility] stated that [address] dispatch notified with eta of 22 minutes. [address] paper work and face sheet sent via fax to [address] [place] [address].      Minor Keating  12/25/22 0366

## 2022-12-25 NOTE — ED PROVIDER NOTES
No MICU available and air care arranged for transport as patient requiring BIPAP, missed dialysis, has hyperkalemia.       Manohar Hall MD  12/25/22 0140

## 2022-12-25 NOTE — CONSULTS
Pharmacy to dose vancomycin in the ED per Dr. Ann Horne. Patient is a 46 y/o M dialysis patient being seen for HAP. Patient missed his dialysis session Friday due to the weather. Wt = 99.8 kg  Give a loading dose of 2000 mg of vancomycin x1 in the ED. If patient is admitted and further vancomycin therapy is wanted, please have admitting MD re-consult Pharmacy for maintenance inpatient dosing.     Thank you for the consult,  Pharmacy

## 2022-12-25 NOTE — PLAN OF CARE
Called by the ED for dialysis needs  Labs reviewed  Lokelma, Insulin and Dextrose IV in the ED  HD today    Full consult to follow.     Bobetta Hodgkin, MD  [facility]

## 2022-12-26 LAB
ALBUMIN SERPL-MCNC: 3.5 G/DL (ref 3.4–5)
ALBUMIN SERPL-MCNC: 3.5 G/DL (ref 3.4–5)
ANION GAP SERPL CALCULATED.3IONS-SCNC: 13 MMOL/L (ref 3–16)
ANION GAP SERPL CALCULATED.3IONS-SCNC: 13 MMOL/L (ref 3–16)
BASOPHILS ABSOLUTE: 0 K/UL (ref 0–0.2)
BASOPHILS RELATIVE PERCENT: 0.1 %
BUN BLDV-MCNC: 44 MG/DL (ref 7–20)
BUN BLDV-MCNC: 61 MG/DL (ref 7–20)
CALCIUM IONIZED: 1.02 MMOL/L (ref 1.12–1.32)
CALCIUM IONIZED: 1.03 MMOL/L (ref 1.12–1.32)
CALCIUM SERPL-MCNC: 8.3 MG/DL (ref 8.3–10.6)
CALCIUM SERPL-MCNC: 8.7 MG/DL (ref 8.3–10.6)
CHLORIDE BLD-SCNC: 93 MMOL/L (ref 99–110)
CHLORIDE BLD-SCNC: 94 MMOL/L (ref 99–110)
CO2: 24 MMOL/L (ref 21–32)
CO2: 24 MMOL/L (ref 21–32)
CREAT SERPL-MCNC: 3 MG/DL (ref 0.9–1.3)
CREAT SERPL-MCNC: 4 MG/DL (ref 0.9–1.3)
EOSINOPHILS ABSOLUTE: 0 K/UL (ref 0–0.6)
EOSINOPHILS RELATIVE PERCENT: 0 %
ESTIMATED AVERAGE GLUCOSE: 151.3 MG/DL
GFR SERPL CREATININE-BSD FRML MDRD: 17 ML/MIN/{1.73_M2}
GFR SERPL CREATININE-BSD FRML MDRD: 24 ML/MIN/{1.73_M2}
GLUCOSE BLD-MCNC: 125 MG/DL (ref 70–99)
GLUCOSE BLD-MCNC: 180 MG/DL (ref 70–99)
GLUCOSE BLD-MCNC: 192 MG/DL (ref 70–99)
GLUCOSE BLD-MCNC: 220 MG/DL (ref 70–99)
GLUCOSE BLD-MCNC: 222 MG/DL (ref 70–99)
GLUCOSE BLD-MCNC: 257 MG/DL (ref 70–99)
GLUCOSE BLD-MCNC: 270 MG/DL (ref 70–99)
HBA1C MFR BLD: 6.9 %
HCT VFR BLD CALC: 22.2 % (ref 40.5–52.5)
HEMOGLOBIN: 7.4 G/DL (ref 13.5–17.5)
LYMPHOCYTES ABSOLUTE: 0.3 K/UL (ref 1–5.1)
LYMPHOCYTES RELATIVE PERCENT: 3.2 %
MAGNESIUM: 2.3 MG/DL (ref 1.8–2.4)
MAGNESIUM: 2.4 MG/DL (ref 1.8–2.4)
MCH RBC QN AUTO: 30.5 PG (ref 26–34)
MCHC RBC AUTO-ENTMCNC: 33.5 G/DL (ref 31–36)
MCV RBC AUTO: 91 FL (ref 80–100)
MONOCYTES ABSOLUTE: 0.6 K/UL (ref 0–1.3)
MONOCYTES RELATIVE PERCENT: 6.5 %
NEUTROPHILS ABSOLUTE: 8.6 K/UL (ref 1.7–7.7)
NEUTROPHILS RELATIVE PERCENT: 90.2 %
PDW BLD-RTO: 16.6 % (ref 12.4–15.4)
PERFORMED ON: ABNORMAL
PH VENOUS: 7.4 (ref 7.35–7.45)
PH VENOUS: 7.41 (ref 7.35–7.45)
PHOSPHORUS: 2.7 MG/DL (ref 2.5–4.9)
PHOSPHORUS: 2.8 MG/DL (ref 2.5–4.9)
PLATELET # BLD: 279 K/UL (ref 135–450)
PMV BLD AUTO: 7.6 FL (ref 5–10.5)
POTASSIUM SERPL-SCNC: 4.7 MMOL/L (ref 3.5–5.1)
POTASSIUM SERPL-SCNC: 5.4 MMOL/L (ref 3.5–5.1)
RBC # BLD: 2.43 M/UL (ref 4.2–5.9)
SODIUM BLD-SCNC: 130 MMOL/L (ref 136–145)
SODIUM BLD-SCNC: 131 MMOL/L (ref 136–145)
WBC # BLD: 9.6 K/UL (ref 4–11)

## 2022-12-26 PROCEDURE — 90945 DIALYSIS ONE EVALUATION: CPT

## 2022-12-26 PROCEDURE — 2500000003 HC RX 250 WO HCPCS: Performed by: INTERNAL MEDICINE

## 2022-12-26 PROCEDURE — 83735 ASSAY OF MAGNESIUM: CPT

## 2022-12-26 PROCEDURE — 2700000000 HC OXYGEN THERAPY PER DAY

## 2022-12-26 PROCEDURE — 2000000000 HC ICU R&B

## 2022-12-26 PROCEDURE — 36415 COLL VENOUS BLD VENIPUNCTURE: CPT

## 2022-12-26 PROCEDURE — 94761 N-INVAS EAR/PLS OXIMETRY MLT: CPT

## 2022-12-26 PROCEDURE — 6360000002 HC RX W HCPCS: Performed by: INTERNAL MEDICINE

## 2022-12-26 PROCEDURE — 6370000000 HC RX 637 (ALT 250 FOR IP): Performed by: INTERNAL MEDICINE

## 2022-12-26 PROCEDURE — 5A1D70Z PERFORMANCE OF URINARY FILTRATION, INTERMITTENT, LESS THAN 6 HOURS PER DAY: ICD-10-PCS | Performed by: INTERNAL MEDICINE

## 2022-12-26 PROCEDURE — 94660 CPAP INITIATION&MGMT: CPT

## 2022-12-26 PROCEDURE — 90935 HEMODIALYSIS ONE EVALUATION: CPT

## 2022-12-26 PROCEDURE — 80069 RENAL FUNCTION PANEL: CPT

## 2022-12-26 PROCEDURE — 85025 COMPLETE CBC W/AUTO DIFF WBC: CPT

## 2022-12-26 PROCEDURE — 99233 SBSQ HOSP IP/OBS HIGH 50: CPT | Performed by: INTERNAL MEDICINE

## 2022-12-26 PROCEDURE — 82330 ASSAY OF CALCIUM: CPT

## 2022-12-26 PROCEDURE — 2580000003 HC RX 258: Performed by: INTERNAL MEDICINE

## 2022-12-26 RX ORDER — TRAZODONE HYDROCHLORIDE 150 MG/1
TABLET ORAL
Qty: 30 TABLET | Refills: 10 | OUTPATIENT
Start: 2022-12-26

## 2022-12-26 RX ORDER — DOXYCYCLINE HYCLATE 100 MG
100 TABLET ORAL EVERY 12 HOURS SCHEDULED
Status: DISCONTINUED | OUTPATIENT
Start: 2022-12-26 | End: 2022-12-27 | Stop reason: HOSPADM

## 2022-12-26 RX ORDER — OXYCODONE AND ACETAMINOPHEN 7.5; 325 MG/1; MG/1
1 TABLET ORAL ONCE
Status: COMPLETED | OUTPATIENT
Start: 2022-12-26 | End: 2022-12-26

## 2022-12-26 RX ADMIN — CALCIUM GLUCONATE 1000 MG: 20 INJECTION, SOLUTION INTRAVENOUS at 03:06

## 2022-12-26 RX ADMIN — INSULIN LISPRO 4 UNITS: 100 INJECTION, SOLUTION INTRAVENOUS; SUBCUTANEOUS at 11:50

## 2022-12-26 RX ADMIN — CLOPIDOGREL BISULFATE 75 MG: 75 TABLET ORAL at 08:47

## 2022-12-26 RX ADMIN — QUETIAPINE FUMARATE 50 MG: 25 TABLET ORAL at 20:15

## 2022-12-26 RX ADMIN — APIXABAN 2.5 MG: 2.5 TABLET, FILM COATED ORAL at 20:15

## 2022-12-26 RX ADMIN — METOPROLOL SUCCINATE 100 MG: 50 TABLET, EXTENDED RELEASE ORAL at 08:47

## 2022-12-26 RX ADMIN — PANTOPRAZOLE SODIUM 40 MG: 40 TABLET, DELAYED RELEASE ORAL at 08:47

## 2022-12-26 RX ADMIN — DOXYCYCLINE HYCLATE 100 MG: 100 TABLET, COATED ORAL at 20:15

## 2022-12-26 RX ADMIN — METOPROLOL SUCCINATE 100 MG: 50 TABLET, EXTENDED RELEASE ORAL at 20:15

## 2022-12-26 RX ADMIN — INSULIN LISPRO 2 UNITS: 100 INJECTION, SOLUTION INTRAVENOUS; SUBCUTANEOUS at 00:10

## 2022-12-26 RX ADMIN — DULOXETINE HYDROCHLORIDE 20 MG: 20 CAPSULE, DELAYED RELEASE ORAL at 08:47

## 2022-12-26 RX ADMIN — PIPERACILLIN AND TAZOBACTAM 3375 MG: 3; .375 INJECTION, POWDER, LYOPHILIZED, FOR SOLUTION INTRAVENOUS at 08:57

## 2022-12-26 RX ADMIN — EPOETIN ALFA-EPBX 4000 UNITS: 4000 INJECTION, SOLUTION INTRAVENOUS; SUBCUTANEOUS at 13:49

## 2022-12-26 RX ADMIN — PRAVASTATIN SODIUM 40 MG: 40 TABLET ORAL at 08:47

## 2022-12-26 RX ADMIN — OXYCODONE AND ACETAMINOPHEN 1 TABLET: 7.5; 325 TABLET ORAL at 20:14

## 2022-12-26 RX ADMIN — Medication 10 ML: at 19:27

## 2022-12-26 RX ADMIN — APIXABAN 2.5 MG: 2.5 TABLET, FILM COATED ORAL at 08:47

## 2022-12-26 ASSESSMENT — PAIN DESCRIPTION - ORIENTATION: ORIENTATION: LOWER

## 2022-12-26 ASSESSMENT — PAIN DESCRIPTION - ONSET: ONSET: ON-GOING

## 2022-12-26 ASSESSMENT — PAIN DESCRIPTION - FREQUENCY: FREQUENCY: CONTINUOUS

## 2022-12-26 ASSESSMENT — PAIN DESCRIPTION - LOCATION: LOCATION: BACK

## 2022-12-26 ASSESSMENT — PAIN DESCRIPTION - PAIN TYPE: TYPE: CHRONIC PAIN

## 2022-12-26 ASSESSMENT — PAIN DESCRIPTION - DESCRIPTORS: DESCRIPTORS: ACHING

## 2022-12-26 ASSESSMENT — PAIN SCALES - GENERAL: PAINLEVEL_OUTOF10: 8

## 2022-12-26 NOTE — DIALYSIS
Treatment time: 3 hours  Net UF: 3000 ml     Pre weight: 101.6 kg  Post weight:98.6 kg  EDW: 99 kg  Crit Line Used: no Ending Profile: no  Refill Present: no    Access used: L TDC    Access function: good  with  ml/min     Medications or blood products given: Retacit 4000 units     Regular outpatient schedule: [facility]     Summary of response to treatment: Patient tolerated treatment well and without any complications. Patient remained stable throughout entire treatment and upon the LPN exiting the patients room. Report given to Hyun Finn RN and copy of dialysis treatment record placed in chart, to be scanned into EMR.

## 2022-12-26 NOTE — PROGRESS NOTES
INPATIENT PULMONARY CRITICAL CARE PROGRESS NOTE      Reason for visit    Respiratory failure    SUBJECTIVE: Patient when seen this morning was comfortably lying in the bed without any increased shortness of breath, patient was undergoing CRRT secondary logistic issues, patient has had removal of nearly 6 L of fluid via CRRT overnight, patient's p.o. intake is improving, patient only used the BiPAP for 1 more hour last night as per nursing, patient is afebrile and he medically maintained, patient is now in sinus rhythm on the monitor, patient's blood sugars are still suboptimally controlled, patient is on 3 L of nasal colostomy saturating 99%, no other pertinent review of system of concern         Physical Exam:  Blood pressure 118/60, pulse 60, temperature 98 °F (36.7 °C), temperature source Oral, resp. rate 21, weight 232 lb 9.4 oz (105.5 kg), SpO2 99 %.'     Constitutional: In no  respiratory distress on nasal cannula when seen   HENT: No facial asymmetry no thyromegaly. Eyes:  Conjunctivae are normal. Pupils equal, round, and reactive to light. No scleral icterus. Neck: . No tracheal deviation present. No obvious thyroid mass. Short large neck  Cardiovascular: Normal rate, regular rhythm, normal heart sounds. No right ventricular heave. some lower extremity edema. Pulmonary/Chest: No wheezes. Decreased bilateral rales. Chest wall is not dull to percussion. No accessory muscle usage or stridor. Decreased breath sound intensity  Abdominal: Soft. Bowel sounds present. No distension or hernia. No tenderness. Obese  Musculoskeletal: No cyanosis. No clubbing. No obvious joint deformity. Lymphadenopathy: No cervical or supraclavicular adenopathy. Skin: Skin is warm and dry. No rash or nodules on the exposed extremities.   Psychiatric: Normal reaction ;slight anxiety   Neurologic Alert and communicative with no focal cranial deficits       Results:  CBC:   Recent Labs     12/25/22  0500 12/26/22  0202   WBC 16.0* 9.6   HGB 8.6* 7.4*   HCT 25.9* 22.2*   MCV 90.1 91.0    279     BMP:   Recent Labs     12/25/22  1112 12/25/22  1821 12/26/22  0202   * 132* 130*   K 8.0* 5.7* 5.4*   CL 88* 95* 93*   CO2 19* 22 24   PHOS 3.5 2.8 2.7   * 88* 61*   CREATININE 8.8* 6.0* 4.0*     LIVER PROFILE:   Recent Labs     12/25/22  0500   AST 11*   ALT 11   BILITOT 0.3   ALKPHOS 91       UA:  Recent Labs     12/25/22  0500   COLORU Yellow   PHUR 7.0   WBCUA 6-9*   RBCUA 3-4   BACTERIA Rare*   CLARITYU Clear   SPECGRAV 1.020   LEUKOCYTESUR Negative   UROBILINOGEN 0.2   BILIRUBINUR Negative   BLOODU SMALL*   GLUCOSEU 500*       Imaging:  I have reviewed radiology images personally. No orders to display     XR CHEST (2 VW)    Result Date: 12/8/2022  EXAMINATION: TWO XRAY VIEWS OF THE CHEST 12/8/2022 1:54 pm COMPARISON: 12/06/2022 radiograph HISTORY: ORDERING SYSTEM PROVIDED HISTORY: sob, hx of cardiomyopathy, pleural effusion, f/u cxr TECHNOLOGIST PROVIDED HISTORY: Reason for exam:->sob, hx of cardiomyopathy, pleural effusion, f/u cxr Reason for Exam: sob, hx of cardiomyopathy, pleural effusion, f/u cxr FINDINGS: Left vascular catheter stable. Heart enlarged. Moderate perihilar airspace opacities are increasing centrally and there is a moderate left pleural effusion with dense airspace opacification in the left lower lobe. No skeletal finding. Relatively stable left pleural effusion with increasing pattern of vascular congestion centrally. XR CHEST (2 VW)    Result Date: 12/6/2022  EXAMINATION: TWO XRAY VIEWS OF THE CHEST 12/6/2022 8:31 am COMPARISON: 12/04/2022 rate HISTORY: ORDERING SYSTEM PROVIDED HISTORY: sob, resp failure, f/u cxr TECHNOLOGIST PROVIDED HISTORY: Reason for exam:->sob, resp failure, f/u cxr Reason for Exam: sob, resp failure, f/u cxr FINDINGS: Stable vascular catheter on the left. The heart is enlarged. Prior AVR. Relatively stable volume of a moderate left pleural effusion.   Dense airspace opacification in the left lower lung zone. Improved aeration of the right lung. No significant skeletal finding. Stable left pleural effusion with associated airspace change in the left lower lobe. Improved aeration in the right lung. XR CHEST PORTABLE    Result Date: 12/25/2022  EXAMINATION: ONE XRAY VIEW OF THE CHEST 12/25/2022 5:27 am COMPARISON: 12/12/2022 HISTORY: ORDERING SYSTEM PROVIDED HISTORY: CHF rales, OB eval for effusions edema TECHNOLOGIST PROVIDED HISTORY: Reason for exam:->CHF rales, OB eval for effusions edema Reason for Exam: CHF rales, OB eval for effusions edema FINDINGS: Cardiac silhouette is enlarged. Tunneled dialysis catheter. Aortic valvular repair. Opacification of the left lung base. Increased right basilar infiltrate. Similar appearing left perihilar infiltrate. No pneumothorax. No acute osseous abnormality is identified. Increased right basilar infiltrate, otherwise similar appearing chest.     XR CHEST PORTABLE    Result Date: 12/12/2022  EXAMINATION: ONE XRAY VIEW OF THE CHEST 12/12/2022 2:45 pm COMPARISON: Chest radiograph dated 12/08/2022 HISTORY: ORDERING SYSTEM PROVIDED HISTORY: post left thora TECHNOLOGIST PROVIDED HISTORY: Reason for exam:->post left thora FINDINGS: Medical devices: A left IJ approach tunneled dialysis catheter is present, unchanged in position with the tip overlying the cavoatrial junction. A endovascular aortic valve is present. Mediastinum/Heart: The mediastinal contours are unchanged compared to prior exam. Lungs: The right lung is clear. No significant interval change in left mid lung and basilar parenchymal opacities. Pleura: Persistent left pleural effusion. No evidence of pneumothorax. Bones/Soft tissues: Nothing acute. No evidence of pneumothorax following left thoracentesis.      XR CHEST PORTABLE    Result Date: 12/4/2022  EXAMINATION: ONE XRAY VIEW OF THE CHEST 12/4/2022 11:21 am COMPARISON: 12/02/2022 and prior HISTORY: ORDERING SYSTEM PROVIDED HISTORY: SOB TECHNOLOGIST PROVIDED HISTORY: Reason for exam:->SOB Reason for Exam: sob FINDINGS: Left dual lumen catheter tip near the cavoatrial junction. Unchanged moderate left pleural effusion with adjacent patchy opacities. Increased patchy opacification overlying the right lower lung zone. Status post TAVR. Nancy Danes Unchanged mild cardiomegaly. Nancy Danes No pneumothorax. No acute osseous abnormality. Aortic knob calcifications. Worsening aeration in the right lower lung zone, otherwise no significant change compared with 12/02/2022. XR CHEST PORTABLE    Result Date: 12/2/2022  EXAMINATION: ONE XRAY VIEW OF THE CHEST 12/2/2022 9:29 pm COMPARISON: Chest 11/25/2022 HISTORY: ORDERING SYSTEM PROVIDED HISTORY: shortness of breath FINDINGS: The cardiac silhouette is enlarged. Sequela from TAVR. Calcifications involving the aorta reflect atherosclerosis. The mediastinal and hilar silhouettes appear unremarkable. Moderate size left pleural effusion with left basilar consolidation obscuring left hemidiaphragm and left heart margin bowel. Vascular engorgement and cephalization is demonstrated with bilateral peribronchial cuffing and perivascular haziness. No focal consolidation or pleural effusion evident on the right. Unchanged left IJ hemodialysis catheter, tip at the superior cavoatrial junction level. No pneumothorax is seen. No acute osseous abnormality is identified. 1.  Congestive heart failure is most likely given the radiographic findings; pneumonia is also a consideration in areas of consolidation with pleural effusion. Similar appearance to prior study. 2. Calcific atherosclerosis aorta. 3. Cardiomegaly. US THORACENTESIS Which side should the procedure be performed?  Left    Result Date: 12/12/2022  PROCEDURE: ULTRASOUND GUIDED LEFT THORACENTESIS 12/12/2022 HISTORY: ORDERING SYSTEM PROVIDED HISTORY: left pleural effusion PHYSICIANS: Elsie Loja D.O. TECHNIQUE: Informed consent was obtained after a detailed explanation of the procedure including risks, benefits, and alternatives. Universal protocol was performed. The left posterior chest was prepped and draped in sterile fashion and local anesthesia was achieved with lidocaine. A 5 [employer] [place]yanne Yueh centesis catheter was advanced under ultrasound guidance into pleural effusion and thoracentesis was performed. The patient tolerated the procedure well and there were no immediate complications. FINDINGS: A total of 650 mL of little colored pleural fluid was removed. Successful ultrasound guided left thoracentesis. Assessment:  Principal Problem:    Acute respiratory failure with hypoxemia (HCC)  Active Problems:    Asthma-COPD overlap syndrome (HCC)    Former smoker    Cardiomyopathy ([facility]    Acute on chronic respiratory failure with hypoxia (HCC)    Hyperkalemia    Hyponatremia    Metabolic acidemia    Pulmonary infiltrate    Leukocytosis    Obesity (BMI 30-39.9)    ZAINAB on CPAP    Chronic anemia    ESRD (end stage renal disease) on dialysis ([facility]    Uncontrolled type 2 diabetes mellitus with hyperglycemia ([facility]  Resolved Problems:    * No resolved hospital problems.  *          Plan:   Oxygen supplementation  to keep saturation being 90-94% only  Please titrate the oxygen as per the above parameters-nursing communication has been sent to that effect  BiPAP on intermittent basis-if patient is noncompliant with the use of BiPAP the patient will have increasing morbidity mortality which has been explained to him multiple times in the past also-patient used the BiPAP only for 1 hour last night as per nursing  CRRT being transitioned to HD as the logistic issues have been resolved as per nephrology  Patient does have some new right pulmonary infiltrate with some leukocytosis  Patient has been started on IV Zosyn by IM-titration as per clinical status and cultures-consider de-escalation-patient WBC count is normal  Pulmonary toilet  Beta-blockers along with Plavix to continue for cardiomyopathy  Lantus insulin has been started by IM-titration of the dosage as per blood glucose monitoring  BGM with SSI  No need for any systemic steroids from pulm standpoint review  Antihypertensives as per IM/nephrology   Bronchodilators on as needed basis  Patient has been started on Retacrit by nephrology  Consider IV Venofer if deemed appropriate  Trend hemodynamics and cardiac rhythm closely  Monitor respiratory status after the fluid removal  Monitor H&H closely  Monitor for any bleeding  Cymbalta and Seroquel as per IM to continue  Monitor for any hypoventilation and hypercarbia  PUD prophylaxis as per primary team    Case d/w ICU team      Patient can be transferred out of ICU to stepdown unit from pulmonary/critical care standpoint of view    Patient's compliance with diet medications BiPAP and recommendations including regular HD is important and any variation will cause him to be having increased symptomatology and recurrent hospitalizations with increasing morbidity and mortality        Electronically signed by:  Husam Arnold MD    12/26/2022    8:24 AM.

## 2022-12-26 NOTE — PROGRESS NOTES
12/25/22 2030   NIV Type   Skin Assessment Clean, dry, & intact   Skin Protection for O2 Device No  (pt refused)   NIV Started/Stopped On   Equipment Type v60   Mode Bilevel   Mask Type Full face mask   Mask Size Medium   Settings/Measurements   IPAP 15 cmH20   CPAP/EPAP 8 cmH2O   Vt (Measured) 580 mL   Rate Ordered 6   Resp 23   FiO2  30 %   Minute Volume (L/min) 13.3 Liters   Mask Leak (lpm) 47 lpm   Comfort Level Good   Using Accessory Muscles No   SpO2 100   Alarm Settings   Alarms On Y   Low Pressure (cmH2O) 6 cmH2O   High Pressure (cmH2O) 30 cmH2O   Delay Alarm 20 sec(s)   RR Low (bpm) 6   RR High (bpm) 40 br/min

## 2022-12-26 NOTE — PROGRESS NOTES
Hospitalist Progress Note      PCP: Pcp No    Date of Admission: 12/25/2022    Chief Complaint: dyspnea       Subjective:  He is feeling much better after fluid removal.        Medications:  Reviewed    Infusion Medications    sodium chloride      dextrose       Scheduled Medications    apixaban  2.5 mg Oral BID    clopidogrel  75 mg Oral Daily    DULoxetine  20 mg Oral Daily    QUEtiapine  50 mg Oral Nightly    pravastatin  40 mg Oral Daily    pantoprazole  40 mg Oral QAM AC    metoprolol succinate  100 mg Oral BID    sodium chloride flush  5-40 mL IntraVENous 2 times per day    piperacillin-tazobactam  3,375 mg IntraVENous Q12H    epoetin siddharth-epbx  4,000 Units IntraVENous Q MWF    insulin glargine  0.15 Units/kg SubCUTAneous Nightly    insulin lispro  0-8 Units SubCUTAneous Q4H    oxyCODONE-acetaminophen  1 tablet Oral Once     PRN Meds: potassium chloride, magnesium sulfate, sodium chloride flush, sodium chloride, ondansetron **OR** ondansetron, polyethylene glycol, acetaminophen **OR** acetaminophen, glucose, dextrose bolus **OR** dextrose bolus, glucagon (rDNA), dextrose      Intake/Output Summary (Last 24 hours) at 12/26/2022 1537  Last data filed at 12/26/2022 1400  Gross per 24 hour   Intake 1494 ml   Output 5315 ml   Net -3821 ml       Physical Exam Performed:    /82   Pulse 69   Temp 98 °F (36.7 °C) (Oral)   Resp 19   Wt 232 lb 9.4 oz (105.5 kg)   SpO2 99%   BMI 34.35 kg/m²     General appearance: No apparent distress, appears stated age and cooperative. HEENT: Pupils equal, round, and reactive to light. Conjunctivae/corneas clear. Neck: Supple, with full range of motion. No jugular venous distention. Trachea midline. Respiratory:  Normal respiratory effort. Clear to auscultation, bilaterally without Rales/Wheezes/Rhonchi. Diminished throughout. Cardiovascular: Regular rate and rhythm with normal S1/S2 without murmurs, rubs or gallops.   Abdomen: Soft, non-tender, non-distended with normal bowel sounds. Musculoskeletal: No clubbing, cyanosis. Only trace pedal pitting edema. Full range of motion without deformity. Skin: Skin color, texture, turgor normal.  No rashes or lesions. Neurologic:  Neurovascularly intact without any focal sensory/motor deficits. Cranial nerves: II-XII intact, grossly non-focal.  Psychiatric: Alert and oriented, thought content appropriate, normal insight  Capillary Refill: Brisk, 3 seconds, normal   Peripheral Pulses: +2 palpable, equal bilaterally       Labs:   Recent Labs     12/25/22  0500 12/26/22 0202   WBC 16.0* 9.6   HGB 8.6* 7.4*   HCT 25.9* 22.2*    279     Recent Labs     12/25/22  1821 12/26/22  0202 12/26/22  0935   * 130* 131*   K 5.7* 5.4* 4.7   CL 95* 93* 94*   CO2 22 24 24   BUN 88* 61* 44*   CREATININE 6.0* 4.0* 3.0*   CALCIUM 8.5 8.3 8.7   PHOS 2.8 2.7 2.8     Recent Labs     12/25/22  0500   AST 11*   ALT 11   BILITOT 0.3   ALKPHOS 91     No results for input(s): INR in the last 72 hours.   Recent Labs     12/25/22  0500   TROPONINI 0.15*       Urinalysis:      Lab Results   Component Value Date/Time    NITRU Negative 12/25/2022 05:00 AM    WBCUA 6-9 12/25/2022 05:00 AM    BACTERIA Rare 12/25/2022 05:00 AM    RBCUA 3-4 12/25/2022 05:00 AM    BLOODU SMALL 12/25/2022 05:00 AM    SPECGRAV 1.020 12/25/2022 05:00 AM    GLUCOSEU 500 12/25/2022 05:00 AM       Radiology:  No orders to display       IP CONSULT TO PULMONOLOGY  IP CONSULT TO PULMONOLOGY    Assessment/Plan:    Active Hospital Problems    Diagnosis     Acute respiratory failure with hypoxemia ([facility] [address] [J96.01]      Priority: Medium    Hyperkalemia [E87.5]      Priority: Medium    Hyponatremia [E87.1]      Priority: Medium    Metabolic acidemia [R58.18]      Priority: Medium    Pulmonary infiltrate [R91.8]      Priority: Medium    Leukocytosis [D72.829]      Priority: Medium    Acute on chronic respiratory failure with hypoxia (HCC) [J96.21]      Priority: Medium    Former smoker [Q68.256]      Priority: Medium    Cardiomyopathy ([facility] [I42.9]      Priority: Medium    Asthma-COPD overlap syndrome ([facility] [J44.9]      Priority: Medium    Uncontrolled type 2 diabetes mellitus with hyperglycemia (HCC) [E11.65]     ESRD (end stage renal disease) on dialysis ([facility] [N18.6, Z99.2]     Chronic anemia [D64.9]     ZAINAB on CPAP [G47.33, Z99.89]     Obesity (BMI 30-39. 9) [E66.9]        The patient is a pleasant 45 Y M with a h/o COPD, HTN, HLD, DM2, CAD, ischemic cardiomyopathy, CHF, CVA, and ESRD on HD. The patient admits that in the past he would \"frequently\" skip outpatient HD appointments and cut short most of the sessions he did attend. This resulted in many admissions for volume overload. He says that he has tried to be much more compliant over the last 6 months. He missed an appointment in 10/2022 because his ride didn't show up, then he missed another appointment on thanksgiving, but otherwise he is adamant that he has been doing much better. He apparently couldn't get out of his house because of the snow, so he missed dialysis this time again. Apparently he couldn't get outpatient HD the following day (rebeca) so he went to an ED with shortness of breath and hypoxia and was transferred to [facility] for ultrafiltration. Apparently there was not HD RN available on [place], so he actually was admitted to the [facility] ICU for CRRT until the following day. Volume overload due to ESRD and acute on chronic systolic CHF. Volume removal with HD. Encouraged ongoing efforts toward sticking to his usual HD schedule. Possible RLL pneumonia. He did claim to have a cough the last few days, and he had a leukocytosis on admission which resolved with zosyn. De-escalated to doxycycline and will treat through 12/31. Flu and COVID negative. Repeat CXR with PCP in a couple months to verify resolution of the airspace disease. Acute on chronic hypoxic respiratory failure, due to above issues. Wean to baseline 4L NC. Obesity and immobility are also contributing. Anemia of chronic disease. Due to ESRD. Trend Hb. No evidence of blood loss. CAD  - continue home plavix, statin, metoprolol     HLD  - continue statin     H/O DVT  - continue eliquis        DVT Prophylaxis: anticoagulation as above  Diet: ADULT DIET; Regular; 4 carb choices (60 gm/meal)  Code Status: Full Code  PT/OT Eval Status: not indicated    Dispo - perhaps 12/27 if Hb stable. He lives at home with his wife.      Appropriate for A1 Discharge Unit: Jazmín Wilkerson MD

## 2022-12-26 NOTE — PROGRESS NOTES
[facility] Progress Note           Subjective/   45y.o. year old male who we are seeing in consultation for ESKD on HD. HPI:  Seen on dialysis. Orders confirmed. Pre-weight at HD today 101.6 kg.  States shortness of breath better, on 3 L NC.    ROS:  +weak, intake adequate. Objective/   GEN:  Chronically ill, /60   Pulse 73   Temp 98 °F (36.7 °C) (Oral)   Resp 22   Wt 232 lb 9.4 oz (105.5 kg)   SpO2 99%   BMI 34.35 kg/m²   HEENT: non-icteric, no JVD  CV: S1, S2 without m/r/g; no LE edema  RESP: CTA B without w/r/r; breathing wnl  ABD: +bs, soft, nt, no hsm  SKIN: warm, no rashes  ACCESS: Left [facility]    Data/  Recent Labs     12/25/22  0500 12/26/22  0202   WBC 16.0* 9.6   HGB 8.6* 7.4*   HCT 25.9* 22.2*   MCV 90.1 91.0    279     Recent Labs     12/25/22  1821 12/26/22  0202 12/26/22  0935   * 130* 131*   K 5.7* 5.4* 4.7   CL 95* 93* 94*   CO2 22 24 24   GLUCOSE 254* 220* 257*   PHOS 2.8 2.7 2.8   MG 2.20 2.40 2.30   BUN 88* 61* 44*   CREATININE 6.0* 4.0* 3.0*   LABGLOM 10* 17* 24*       Assessment/     - End stage kidney disease - on HD Mon-Wed-Fri     - ?PNA versus fluid overload     - Hyperkalemia     - Atrial fibrillation    - Anemia of chronic disease       Plan/     - HD today with ~2-3 L U target, EDW 99 kg  - Increased DAVON dose with HD today  - Trend labs, bp's    ____________________________________  Enmanuel Mendoza MD  [facility]  [email or web address]  Office: [phone]

## 2022-12-27 VITALS
HEART RATE: 63 BPM | HEIGHT: 69 IN | TEMPERATURE: 98 F | BODY MASS INDEX: 33.67 KG/M2 | DIASTOLIC BLOOD PRESSURE: 76 MMHG | RESPIRATION RATE: 22 BRPM | WEIGHT: 227.29 LBS | OXYGEN SATURATION: 100 % | SYSTOLIC BLOOD PRESSURE: 118 MMHG

## 2022-12-27 LAB
ALBUMIN SERPL-MCNC: 3.5 G/DL (ref 3.4–5)
ANION GAP SERPL CALCULATED.3IONS-SCNC: 13 MMOL/L (ref 3–16)
BUN BLDV-MCNC: 42 MG/DL (ref 7–20)
CALCIUM SERPL-MCNC: 8.1 MG/DL (ref 8.3–10.6)
CHLORIDE BLD-SCNC: 94 MMOL/L (ref 99–110)
CO2: 24 MMOL/L (ref 21–32)
CREAT SERPL-MCNC: 3.3 MG/DL (ref 0.9–1.3)
GFR SERPL CREATININE-BSD FRML MDRD: 21 ML/MIN/{1.73_M2}
GLUCOSE BLD-MCNC: 112 MG/DL (ref 70–99)
GLUCOSE BLD-MCNC: 158 MG/DL (ref 70–99)
HCT VFR BLD CALC: 25.2 % (ref 40.5–52.5)
HEMOGLOBIN: 8.3 G/DL (ref 13.5–17.5)
MCH RBC QN AUTO: 30 PG (ref 26–34)
MCHC RBC AUTO-ENTMCNC: 32.9 G/DL (ref 31–36)
MCV RBC AUTO: 91.3 FL (ref 80–100)
PDW BLD-RTO: 16.2 % (ref 12.4–15.4)
PERFORMED ON: ABNORMAL
PHOSPHORUS: 4.7 MG/DL (ref 2.5–4.9)
PLATELET # BLD: 326 K/UL (ref 135–450)
PMV BLD AUTO: 7.5 FL (ref 5–10.5)
POTASSIUM SERPL-SCNC: 4.5 MMOL/L (ref 3.5–5.1)
RBC # BLD: 2.76 M/UL (ref 4.2–5.9)
SODIUM BLD-SCNC: 131 MMOL/L (ref 136–145)
WBC # BLD: 8.5 K/UL (ref 4–11)

## 2022-12-27 PROCEDURE — 80069 RENAL FUNCTION PANEL: CPT

## 2022-12-27 PROCEDURE — 92526 ORAL FUNCTION THERAPY: CPT

## 2022-12-27 PROCEDURE — 6370000000 HC RX 637 (ALT 250 FOR IP): Performed by: INTERNAL MEDICINE

## 2022-12-27 PROCEDURE — 85027 COMPLETE CBC AUTOMATED: CPT

## 2022-12-27 PROCEDURE — 94660 CPAP INITIATION&MGMT: CPT

## 2022-12-27 PROCEDURE — 2580000003 HC RX 258: Performed by: INTERNAL MEDICINE

## 2022-12-27 PROCEDURE — 92610 EVALUATE SWALLOWING FUNCTION: CPT

## 2022-12-27 PROCEDURE — 36415 COLL VENOUS BLD VENIPUNCTURE: CPT

## 2022-12-27 RX ORDER — CITALOPRAM 40 MG/1
TABLET ORAL
Qty: 30 TABLET | Refills: 10 | OUTPATIENT
Start: 2022-12-27

## 2022-12-27 RX ORDER — CARVEDILOL 12.5 MG/1
TABLET ORAL
Qty: 60 TABLET | Refills: 10 | OUTPATIENT
Start: 2022-12-27

## 2022-12-27 RX ORDER — DOXYCYCLINE HYCLATE 100 MG
100 TABLET ORAL EVERY 12 HOURS SCHEDULED
Qty: 8 TABLET | Refills: 0 | Status: SHIPPED | OUTPATIENT
Start: 2022-12-27 | End: 2022-12-31

## 2022-12-27 RX ADMIN — DOXYCYCLINE HYCLATE 100 MG: 100 TABLET, COATED ORAL at 08:42

## 2022-12-27 RX ADMIN — Medication 10 ML: at 08:43

## 2022-12-27 RX ADMIN — DULOXETINE HYDROCHLORIDE 20 MG: 20 CAPSULE, DELAYED RELEASE ORAL at 08:41

## 2022-12-27 RX ADMIN — APIXABAN 2.5 MG: 2.5 TABLET, FILM COATED ORAL at 08:42

## 2022-12-27 RX ADMIN — PANTOPRAZOLE SODIUM 40 MG: 40 TABLET, DELAYED RELEASE ORAL at 06:18

## 2022-12-27 RX ADMIN — PRAVASTATIN SODIUM 40 MG: 40 TABLET ORAL at 08:41

## 2022-12-27 RX ADMIN — CLOPIDOGREL BISULFATE 75 MG: 75 TABLET ORAL at 08:41

## 2022-12-27 RX ADMIN — METOPROLOL SUCCINATE 100 MG: 50 TABLET, EXTENDED RELEASE ORAL at 08:37

## 2022-12-27 ASSESSMENT — PAIN SCALES - WONG BAKER
WONGBAKER_NUMERICALRESPONSE: 0

## 2022-12-27 ASSESSMENT — PAIN SCALES - GENERAL: PAINLEVEL_OUTOF10: 6

## 2022-12-27 NOTE — PROGRESS NOTES
12/27/22 0342   NIV Type   Skin Assessment Clean, dry, & intact   Skin Protection for O2 Device No   Equipment Type v60   Mode Bilevel   Mask Type Full face mask   Mask Size Medium   Settings/Measurements   IPAP 15 cmH20   CPAP/EPAP 8 cmH2O   Vt (Measured) 542 mL   Rate Ordered 16   Resp 16   FiO2  30 %   I Time/ I Time % 1 s   Minute Volume (L/min) 9.5 Liters   Mask Leak (lpm) 46 lpm   Comfort Level Good   Using Accessory Muscles No   SpO2 98   Patient's Home Machine No

## 2022-12-27 NOTE — DISCHARGE INSTRUCTIONS
Follow up with PCP within 1-2 weeks. Heart Failure Resources:    Heart Failure Interactive Workbook:   Go to [email or web address]-gtg.com/aha-heartfailure for a Free Heart Failure Interactive Workbook provided by [employer]. This interactive workbook will provide information on Healthier Living with Heart Failure. Please copy and paste link into search bar. Use your mouse to scroll through the pages. HF [place] stephanie:   Heart Failure Free smart phone stephanie available for iPhone and Android download. Use your phone to track sodium intake, fluid intake, symptoms, and weight. Low Sodium Diet:  Go to www. [employer]. org website for [employer] which is Low Sodium! [employer] is a dialysis company, but this website offers free seasonal cookbooks. Each quarter, they will release 25-30 new recipes with a breakdown of calories, sodium, and glucose. Recipes:   Go to [email or web address] website for free recipes.

## 2022-12-27 NOTE — PLAN OF CARE
Patient's EF (Ejection Fraction) is less than 40%    Heart Failure Medications:  Diuretics[de-identified] None    (One of the following REQUIRED for EF <40%/SYSTOLIC FAILURE but MAY be used in EF% >40%/DIASTOLIC FAILURE)        ACE[de-identified] None        ARB[de-identified] None         ARNI[de-identified] None    (Beta Blockers)  NON- Evidenced Based Beta Blocker (for EF% >40%/DIASTOLIC FAILURE): None    Evidenced Based Beta Blocker::(REQUIRED for EF% <40%/SYSTOLIC FAILURE) Metoprolol SUCCinate- Toprol XL and None  . .................................................................................................................................................. Patient's Last Weight: 227 lbs obtained by standing scale. Difference in weight is 0 pounds  than last documented weight.     Intake/Output Summary (Last 24 hours) at 12/27/2022 1211  Last data filed at 12/27/2022 0800  Gross per 24 hour   Intake 1290 ml   Output 0 ml   Net 1290 ml       CHF Education booklet provided: yes    Comorbidities Reviewed Yes  Patient has a past medical history of Ambulatory dysfunction, Aortic stenosis, Arthritis, Asthma, Bilateral hilar adenopathy syndrome, CAD (coronary artery disease), Cardiomyopathy ([address] CHF (congestive heart failure) ([address] Chronic pain, COPD (chronic obstructive pulmonary disease) ([address] Depression, Diabetes mellitus ([address] Difficult intravenous access, Emphysema of lung ([address] ESRD (end stage renal disease) on dialysis ([address] Fear of needles, Gastric ulcer, GERD (gastroesophageal reflux disease), Heart valve problem, Hemodialysis patient ([address] History of spinal fracture, Hx of blood clots, Hyperlipidemia, Hypertension, MI (myocardial infarction) ([facility] Neuromuscular disorder ([facility] [address] Numbness and tingling in left arm, Pneumonia, PONV (postoperative nausea and vomiting), Prolonged emergence from general anesthesia, Sleep apnea, Stroke ([facility] [address] TIA (transient ischemic attack), and Unspecified diseases of blood and blood-forming organs.     >> For CHF and Comorbidity Education Time and Topics, please see Education Tab. Progressive Mobility Assessment:  What is this patient's Current Level of Mobility?: Ambulatory-Up Ad [place]  How was this patient Mobilized today?: Up in Room                 With Whom? Self                 Level of Difficulty/Assistance: Independent     Pt is currently  on room air . Pt without lower extremity edema.  Patient's weights and intake/output reviewed:      Patient and/or Family's stated Goal of Care this Admission: reduce shortness of breath, increase activity tolerance, better understand heart failure and disease management, be more comfortable, and reduce lower extremity edema prior to discharge       Problem: Discharge Planning  Goal: Discharge to home or other facility with appropriate resources  Outcome: Completed     Problem: Safety - Adult  Goal: Free from fall injury  Outcome: Completed     Problem: Pain  Goal: Verbalizes/displays adequate comfort level or baseline comfort level  Outcome: Completed     Problem: ABCDS Injury Assessment  Goal: Absence of physical injury  Outcome: Completed     Problem: Chronic Conditions and Co-morbidities  Goal: Patient's chronic conditions and co-morbidity symptoms are monitored and maintained or improved  Outcome: Completed

## 2022-12-27 NOTE — DISCHARGE INSTR - COC
Continuity of Care Form    Patient Name: Roro Ambrosio   :  1970  MRN:  [id number]    Admit date:  2022  Discharge date:  ***    Code Status Order: Full Code   Advance Directives:     Admitting Physician:  Nabeel Muller MD  PCP: Pcp No    Discharging Nurse: [facility]/Room#: 0229/0229-01  Discharging Unit Phone Number: ***    Emergency Contact:   Extended Emergency Contact Information  Primary Emergency Contact: Crystal Ann  Address: [address] [place] [address], [address] [facility] [place] Phone: [phone]  Mobile Phone: [phone]  Relation: Spouse  Secondary Emergency Contact: ALICIA HARO JR. [facility] Phone: [phone]  Mobile Phone: [phone]  Relation: Brother/Sister  Preferred language: English   needed?  No    Past Surgical History:  Past Surgical History:   Procedure Laterality Date    AORTIC VALVE REPLACEMENT N/A 10/15/2019    TRANSCATHETER AORTIC VALVE REPLACEMENT FEMORAL APPROACH performed by Joanna Dunn MD at [facility] Right 2019    PERITONEAL DIALYSIS CATHETER REMOVAL performed by Can Graham MD at [facility]      WNL    CORONARY ANGIOPLASTY WITH STENT PLACEMENT  05/26/15    CYST REMOVAL  2013    EXCISION CYSTS, NECK X2 AND ABDOMINAL benign    DIAGNOSTIC CARDIAC CATH LAB PROCEDURE      DIALYSIS FISTULA CREATION Left 10/30/2017    LEFT BRACHIAL CEPHALIC FISTULA    DIALYSIS FISTULA CREATION Left 3/27/2019    LIGATION  AV FISTULA performed by Cristiane Lopez MD at [address] [place] [address], [facility]      IR TUNNELED CATHETER PLACEMENT GREATER THAN 5 YEARS  3/21/2022    IR TUNNELED CATHETER PLACEMENT GREATER THAN 5 YEARS 3/21/2022 [facility]    IR TUNNELED CATHETER PLACEMENT GREATER THAN 5 YEARS  2022    IR TUNNELED CATHETER PLACEMENT GREATER THAN 5 YEARS 2022 [facility]    IR TUNNELED CATHETER PLACEMENT GREATER THAN 5 YEARS  4/26/2022    IR TUNNELED CATHETER PLACEMENT GREATER THAN 5 YEARS 4/26/2022 [facility]    IR TUNNELED CATHETER PLACEMENT GREATER THAN 5 YEARS  6/23/2022    IR TUNNELED CATHETER PLACEMENT GREATER THAN 5 YEARS 6/23/2022 [facility]    OTHER SURGICAL HISTORY  02/01/2017    laparoscopic cholecystectomy with intraoperative cholangiogram    OTHER SURGICAL HISTORY  2018    PORT PLACEMENT  - vas cath    OTHER SURGICAL HISTORY Bilateral 06/26/2018    laprascopic peritoneal dialysis catheter placement    OTHER SURGICAL HISTORY Right 09/2018    peritoneal dialysis port placed on right side of abdomen    OTHER SURGICAL HISTORY  05/28/2019    PTA/Stenting R External Iliac artery    NM LAP INSERTION TUNNELED INTRAPERITONEAL CATHETER N/A 9/21/2018    LAPAROSCOPIC PERITONEAL DIALYSIS CATHETER REPLACEMENT performed by Liam Salazar MD at [address] 2/24/2022    PERINEAL ABCESS DRAINAGE performed by Liam Salazar MD at [address] [place] [address] N/A 10/2/2022    THORACENTESIS ULTRASOUND performed by Florentino Aranda MD at [address]  01/06/2016    UPPER GASTROINTESTINAL ENDOSCOPY  01/29/2017    possible candida, otherwise normal appearing    VASCULAR SURGERY  aprx 2 years ago    2 stents placed, each side of groin       Immunization History:   Immunization History   Administered Date(s) Administered    Hepatitis B Adult (Engerix-B) 11/18/2017, 06/13/2018, 07/13/2018    INFLUENZA, INTRADERMAL, QUADRIVALENT, PRESERVATIVE FREE 11/16/2016    Influenza Virus Vaccine 12/27/2012, 10/07/2014, 11/20/2015, 10/16/2019    Influenza, FLUARIX, FLULAVAL, FLUZONE (age 10 mo+) AND AFLURIA, (age 1 y+), PF, 0.5mL 10/16/2019    Influenza, FLUCELVAX, (age 10 mo+), MDCK, PF, 0.5mL 10/14/2017, 09/30/2020, 10/05/2021    Influenza, Quadv, 6 mo and older, IM, PF (Flulaval, Fluarix) 09/15/2018 PPD Test 11/09/2017, 11/16/2017, 01/03/2019, 01/06/2020, 01/15/2021    Pneumococcal Conjugate 13-valent (Aucakvh92) 10/14/2017    Pneumococcal Polysaccharide (Xlcopscak50) 10/07/2014, 11/19/2019    Tdap (Boostrix, Adacel) 02/13/2020    Zoster Recombinant (Shingrix) 02/13/2020       Active Problems:  Patient Active Problem List   Diagnosis Code    Sepsis without acute organ dysfunction ([facility] A41.9    CHF (congestive heart failure) ([facility]) I50.9    Asthma-COPD overlap syndrome ([facility]) J44.9    CAD (coronary artery disease) I25.10    PVD (peripheral vascular disease) ([facility] I73.9    Bicuspid aortic valve Q23.1    Bilateral hilar adenopathy syndrome R59.0    Claudication in peripheral vascular disease ([facility]) I73.9    Uncontrolled hypertension I10    Diabetic neuropathy ([facility]) E11.40    DM2 (diabetes mellitus, type 2) ([facility]) E11.9    Passive smoke exposure Z77.22    Depression with anxiety F41.8    PNA (pneumonia) J18.9    Obesity (BMI 30-39. 9) E66.9    ZAINAB on CPAP G47.33, Z99.89    Degeneration of lumbar or lumbosacral intervertebral disc M51.37    Lumbar radiculopathy M54.16    Lumbosacral spondylosis without myelopathy U20.172    Biliary colic N10.74    Symptomatic cholelithiasis K80.20    Gastroparesis due to DM ([facility]) E11.43, K31.84    Angina, class IV ([facility]) I20.9    Dyspnea R06.00    Dyslipidemia E78.5    Acute on chronic combined systolic and diastolic heart failure ([facility]) I50.43    Ischemic cardiomyopathy I25.5    Tobacco abuse Z72.0    CVA (cerebral vascular accident) ([facility] I63.9    History of CVA (cerebrovascular accident) Z86.73    Type 2 diabetes mellitus without complication, without long-term current use of insulin ([facility]) E11.9    ZAINAB (obstructive sleep apnea) G47.33    Pleural effusion on left J90    Chronic anemia D64.9    Nonrheumatic aortic valve stenosis I35.0    Mucus plugging of bronchi T17.500A    Hemodialysis-associated hypotension I95.3    ESRD (end stage renal disease) on dialysis (HCC) N18.6, Z99.2 Hypotension due to drugs C75.5    Acute diastolic CHF (congestive heart failure) ([facility]) I50.31    Neuromuscular disorder ([facility]) G70.9    Renovascular hypertension I15.0    Mixed hyperlipidemia E78.2    Cigarette nicotine dependence in remission F17.211    Pulmonary edema J81.1    Fluid overload E87.70    Anemia of chronic disease D63.8    SOB (shortness of breath) on exertion R06.02    Steal syndrome of dialysis vascular access [facility]) T82.898A    Chronic, continuous use of opioids F11.90    Chronic bronchitis ([facility]) J42    Nasal congestion R09.81    Hypercholesteremia E78.00    Bradycardia R00.1    History of transcatheter aortic valve replacement (TAVR) Z95.2    Syncope and collapse R55    PAF (paroxysmal atrial fibrillation) ([facility]) I48.0    Bilateral leg weakness R29.898    GBS (Guillain-Vallejo syndrome) ([facility]) G61.0    Sinus pause I45.5    Weakness of both lower extremities R29.898    Sinus bradycardia R00.1    Ataxia R27.0    Peripheral vascular occlusive disease ([facility]) I73.9    Cellulitis of right foot L03.115    Iliac artery occlusion, right ([facility]) I74.5    Cellulitis and abscess of hand L03.119, L02.519    Uncontrolled type 2 diabetes mellitus with hyperglycemia ([facility]) E11.65    Acute encephalopathy G93.40    Acute hypoxemic respiratory failure ([facility]) J96.01    Acute CVA (cerebrovascular accident) ([address] I63.9    Speech problem R47.9    Urinary tract infection with hematuria N39.0, R31.9    Respiratory failure with hypoxia ([address] J96.91    Acute respiratory failure with hypercapnia ([facility]) J96.02    Acute pulmonary edema ([facility]) J81.0    Grade II diastolic dysfunction W46.56    Shock circulatory ([facility]) R57.9    Smoker F17.200    Normocytic normochromic anemia D64.9    NSTEMI (non-ST elevated myocardial infarction) ([facility]) I21.4    SIRS (systemic inflammatory response syndrome) ([facility]) R65.10    Atrial flutter ([facility]) I48.92    Liberty-rectal abscess K61.1    Somnolence R40.0    Pulmonary edema with diastolic CHF, NYHA class 3 ([address] I50.30    Respiratory failure ([facility] J96.90    Former smoker Z87.891    Cardiomyopathy ([facility] I42.9    Morbid obesity with BMI of 40.0-44.9, adult ([facility] E66.01, Z68.41    Hypoglycemia E16.2    Altered mental status R41.82    Hypertensive emergency I16.1    SOB (shortness of breath) R06.02    Acute respiratory failure with hypoxia and hypercapnia ([facility]) J96.01, J96.02    HFrEF (heart failure with reduced ejection fraction) ([facility]) I50.20    Pericardial effusion I31.39    Hypervolemia E87.70    Acute pneumonia J18.9    Acute on chronic respiratory failure with hypoxia ([facility]) J96.21    Compression atelectasis J98.11    Type 2 myocardial infarction ([facility] I21. A1    Acute respiratory failure with hypoxemia ([facility]) J96.01    Hyperkalemia E87.5    Hyponatremia A32.1    Metabolic acidemia F92.89    Pulmonary infiltrate R91.8    Leukocytosis D72.829       Isolation/Infection:   Isolation            No Isolation          Patient Infection Status       Infection Onset Added Last Indicated Last Indicated By Review Planned Expiration Resolved Resolved By    None active    Resolved    COVID-19 (Rule Out) 12/25/22 12/25/22 12/25/22 COVID-19 & Influenza Combo (Ordered)   12/25/22 Rule-Out Test Resulted    COVID-19 (Rule Out) 12/04/22 12/04/22 12/04/22 COVID-19 & Influenza Combo (Ordered)   12/04/22 Rule-Out Test Resulted    COVID-19 (Rule Out) 12/03/22 12/03/22 12/03/22 COVID-19 & Influenza Combo (Ordered)   12/03/22 Rule-Out Test Resulted    C-diff Rule Out 12/02/22 12/02/22 12/02/22 Clostridium difficile toxin/antigen (Ordered)   12/03/22 Rule-Out Test Canceled    COVID-19 (Rule Out) 11/25/22 11/25/22 11/25/22 COVID-19 & Influenza Combo (Ordered)   11/25/22 Rule-Out Test Resulted    COVID-19 (Rule Out) 10/18/22 10/18/22 10/18/22 COVID-19, Rapid (Ordered)   10/18/22 Rule-Out Test Resulted    COVID-19 (Rule Out) 09/23/22 09/23/22 09/23/22 COVID-19, Rapid (Ordered)   09/23/22 Rule-Out Test Resulted    COVID-19 (Rule Out) 08/02/22 08/02/22 08/02/22 COVID-19, Rapid (Ordered)   08/02/22 Rule-Out Test Resulted    COVID-19 (Rule Out) 07/05/22 07/05/22 07/06/22 SARS-CoV-2 NAAT (Rapid) (Ordered)   07/06/22 Rule-Out Test Resulted    COVID-19 (Rule Out) 05/29/22 05/29/22 05/29/22 COVID-19, Rapid (Ordered)   05/29/22 Rule-Out Test Resulted    COVID-19 (Rule Out) 04/18/22 04/18/22 04/18/22 COVID-19, Rapid (Ordered)   04/18/22 Rule-Out Test Resulted    COVID-19 (Rule Out) 02/25/22 02/25/22 02/25/22 COVID-19, Rapid (Ordered)   02/25/22 Rule-Out Test Resulted    COVID-19 (Rule Out) 01/12/22 01/12/22 01/12/22 COVID-19, Rapid (Ordered)   01/12/22 Rule-Out Test Resulted    COVID-19 (Rule Out) 11/29/21 11/29/21 11/29/21 COVID-19, Rapid (Ordered)   11/30/21 Rule-Out Test Resulted    COVID-19 (Rule Out) 08/29/21 08/29/21 08/29/21 COVID-19 (Ordered)   08/29/21 Rule-Out Test Resulted    COVID-19 (Rule Out) 12/18/20 12/18/20 12/18/20 COVID-19 (Ordered)   12/18/20 Rule-Out Test Resulted    COVID-19 (Rule Out) 05/04/20 05/04/20 05/04/20 COVID-19 (Ordered)   05/04/20 Rule-Out Test Resulted            Nurse Assessment:  Last Vital Signs: /76   Pulse 63   Temp 98 °F (36.7 °C) (Oral)   Resp 22   Wt 227 lb 4.7 oz (103.1 kg)   SpO2 100%   BMI 33.57 kg/m²     Last documented pain score (0-10 scale): Pain Level: 8  Last Weight:   Wt Readings from Last 1 Encounters:   12/27/22 227 lb 4.7 oz (103.1 kg)     Mental Status:  {IP PT MENTAL STATUS:04330}    IV Access:  { [facility] IV ACCESS:899018073}    Nursing Mobility/ADLs:  Walking   {[facility] DME MBIV:556669569}  Transfer  {[facility] DME CQNK:236933623}  Bathing  {[facility] DME AFLW:937423327}  Dressing  {[facility] DME XJRC:202445191}  Toileting  {[facility] DME LHRE:334375189}  Feeding  {[facility] DME QKFW:917197057}  Med Admin  {[facility] DME VKJZ:154718930}  Med Delivery   { [facility] MED Delivery:156299239}    Wound Care Documentation and Therapy:  Wound 08/30/21 Toe (Comment  which one) Right Great Toe (Active)   Number of days: 483       Wound 01/12/22 Pedal Anterior;Right Healing scab from previous blister (per pt), flaky edges (Active)   Number of days: 349       Wound 22 Toe (Comment  which one) Anterior;Right Scab from old blister (per pt) on 4th toe, flaky edges (Active)   Number of days: 349       Incision 22 Perineum (Active)   Number of days: 305        Elimination:  Continence: Bowel: {YES / YR:38176}  Bladder: {YES / PW:22623}  Urinary Catheter: {Urinary Catheter:090583857}   Colostomy/Ileostomy/Ileal Conduit: {YES / IV:41946}       Date of Last BM: ***    Intake/Output Summary (Last 24 hours) at 2022 1117  Last data filed at 2022 0800  Gross per 24 hour   Intake 1290 ml   Output 0 ml   Net 1290 ml     I/O last 3 completed shifts: In: 4478 [P.O.:1310;  I.V.:60; IV Piggyback:134]  Out: 4018     Safety Concerns:     [employer] Safety Concerns:919478306}    Impairments/Disabilities:      [employer] Impairments/Disabilities:503418434}    Nutrition Therapy:  Current Nutrition Therapy:   [employer] Diet List:170261368}    Routes of Feeding: {[facility] DME Other Feedings:144417750}  Liquids: {Slp liquid thickness:51779}  Daily Fluid Restriction: {[facility] DME Yes amt example:527342149}  Last Modified Barium Swallow with Video (Video Swallowing Test): {Done Not Done IOGP:704181572}    Treatments at the Time of Hospital Discharge:   Respiratory Treatments: ***  Oxygen Therapy:  {Therapy; copd oxygen:11659}  Ventilator:    {[facility] Vent VFYB:087012734}    Rehab Therapies: {THERAPEUTIC INTERVENTION:9620026361}  Weight Bearing Status/Restrictions: [employer]  Weight Bearin}  Other Medical Equipment (for information only, NOT a DME order):  {EQUIPMENT:036914170}  Other Treatments: ***    Patient's personal belongings (please select all that are sent with patient):  {[facility] DME Belongings:530746538}    RN SIGNATURE:  {Esignature:283148356}    CASE MANAGEMENT/SOCIAL WORK SECTION    Inpatient Status Date: ***    Readmission Risk Assessment Score:  Readmission Risk Risk of Unplanned Readmission:  86           Discharging to Facility/ Agency   Name:   Address:  Phone:  Fax:    Dialysis Facility (if applicable)   Name:  Address:  Dialysis Schedule:  Phone:  Fax:    / signature: {Esignature:281553898}    PHYSICIAN SECTION    Prognosis: Fair    Condition at Discharge: Stable    Rehab Potential (if transferring to Rehab): Fair    Recommended Labs or Other Treatments After Discharge: Follow up with PCP within 1-2 weeks. Physician Certification: I certify the above information and transfer of Wu Friend  is necessary for the continuing treatment of the diagnosis listed and that he requires Home Care for less 30 days.      Update Admission H&P: No change in H&P    PHYSICIAN SIGNATURE:  Electronically signed by Kassie Arellano MD on 12/27/22 at 11:17 AM EST

## 2022-12-27 NOTE — PROGRESS NOTES
Speech Language Pathology    Speech Language Pathology  Clinical Bedside Swallow Assessment  Facility/Department: [facility] C2 CARD TELEMETRY        Recommendations:  Diet recommendation: IDDSI 7 Regular Solids; IDDSI 0 Thin Liquids; Meds whole with thin liquids  Instrumentation: Not warranted at this time  Risk management: upright for all intake, stay upright for at least 30 mins after intake, small bites/sips, downgrade to mildly thick if s/s of aspiration develop, alternate bites/sips, slow rate of intake, general GERD precautions, general aspiration precautions, and hold PO and contact SLP if s/s of aspiration or worsening respiratory status develop. NAME:Igor James  : 1970 (45 y.o.)   MRN: [id number]  ROOM:   ADMISSION DATE: 2022  PATIENT DIAGNOSIS(ES): Acute respiratory failure with hypoxemia (HCC) [J96.01]  Respiratory failure with hypoxia ([address] [J96.91]  Hyperkalemia [E87.5]  No chief complaint on file.     Patient Active Problem List    Diagnosis Date Noted    Hypotension due to drugs 2017    Acute on chronic combined systolic and diastolic heart failure ([address]     Ischemic cardiomyopathy     Dyslipidemia     DM2 (diabetes mellitus, type 2) ([address] 2014    Bicuspid aortic valve 2013    CHF (congestive heart failure) ([address] 2013    CAD (coronary artery disease) 2013    PVD (peripheral vascular disease) ([address] 2013    Acute respiratory failure with hypoxemia ([address] 2022    Hyperkalemia 2022    Hyponatremia     Metabolic acidemia     Pulmonary infiltrate 2022    Leukocytosis 2022    Type 2 myocardial infarction ([address] 2022    Compression atelectasis 2022    Acute on chronic respiratory failure with hypoxia ([address] 2022    Acute pneumonia 2022    Hypervolemia 10/19/2022    Pericardial effusion 2022    HFrEF (heart failure with reduced ejection fraction) ([address] 2022 Acute respiratory failure with hypoxia and hypercapnia ([address] 09/23/2022    Hypertensive emergency 07/17/2022    SOB (shortness of breath) 07/17/2022    Altered mental status     Hypoglycemia 05/29/2022    Morbid obesity with BMI of 40.0-44.9, adult ([address] 04/26/2022    Former smoker 04/19/2022    Cardiomyopathy ([address] 04/19/2022    History of transcatheter aortic valve replacement (TAVR) 10/19/2019    Uncontrolled hypertension 11/14/2013    Asthma-COPD overlap syndrome ([address] 05/14/2013    Respiratory failure ([address] 04/18/2022    Pulmonary edema with diastolic CHF, NYHA class 3 ([address] 03/17/2022    Liberty-rectal abscess     Somnolence     Atrial flutter ([address]     SIRS (systemic inflammatory response syndrome) ([address] 02/21/2022    NSTEMI (non-ST elevated myocardial infarction) ([address] 01/12/2022    Acute respiratory failure with hypercapnia ([address] 11/30/2021    Acute pulmonary edema ([address] 11/30/2021    Grade II diastolic dysfunction 00/09/6782    Shock circulatory ([address] 11/30/2021    Smoker 11/30/2021    Normocytic normochromic anemia 11/30/2021    Respiratory failure with hypoxia ([address] 11/29/2021    Speech problem     Urinary tract infection with hematuria     Acute CVA (cerebrovascular accident) ([address] 09/07/2021    Acute hypoxemic respiratory failure ([address] 08/12/2021    Uncontrolled type 2 diabetes mellitus with hyperglycemia ([address] 06/27/2021    Acute encephalopathy 06/27/2021    Cellulitis and abscess of hand 04/24/2021    Iliac artery occlusion, right (HCC)     Cellulitis of right foot 01/29/2021    Peripheral vascular occlusive disease ([address] 01/02/2021    Ataxia     Weakness of both lower extremities 12/30/2020    Sinus bradycardia 12/30/2020    Sinus pause     GBS (Guillain-Rudd syndrome) (HCC)     Bilateral leg weakness 12/04/2020    Bradycardia 10/19/2019    Syncope and collapse 10/19/2019    PAF (paroxysmal atrial fibrillation) ([facility]     Hypercholesteremia 07/30/2019    Chronic bronchitis ([facility] 05/21/2019    Nasal congestion 05/21/2019    Chronic, continuous use of opioids 04/15/2019    Steal syndrome of dialysis vascular access (HCC)     SOB (shortness of breath) on exertion 12/24/2018    Anemia of chronic disease 11/12/2018    Pulmonary edema 11/09/2018    Fluid overload 11/09/2018    Renovascular hypertension     Mixed hyperlipidemia     Cigarette nicotine dependence in remission     Neuromuscular disorder (HCC)     Acute diastolic CHF (congestive heart failure) ([address] 03/18/2018    Hemodialysis-associated hypotension 11/22/2017    ESRD (end stage renal disease) on dialysis ([address] 11/22/2017    Mucus plugging of bronchi     Nonrheumatic aortic valve stenosis     Pleural effusion on left     Chronic anemia     History of CVA (cerebrovascular accident)     Type 2 diabetes mellitus without complication, without long-term current use of insulin ([facility])     ZAINAB (obstructive sleep apnea)     Tobacco abuse 05/21/2017    CVA (cerebral vascular accident) ([address] 05/21/2017    Angina, class IV ([address]     Dyspnea     Gastroparesis due to DM ([address]     Biliary colic 88/13/2033    Symptomatic cholelithiasis 01/04/2017    Degeneration of lumbar or lumbosacral intervertebral disc 03/18/2016    Lumbar radiculopathy 03/18/2016    Lumbosacral spondylosis without myelopathy 03/18/2016    ZAINAB on CPAP 02/11/2016    Obesity (BMI 30-39. 9)     PNA (pneumonia) 03/28/2015    Depression with anxiety 06/05/2014    Passive smoke exposure 05/30/2014    Diabetic neuropathy ([address] 11/14/2013    Claudication in peripheral vascular disease ([address] 06/26/2013    Bilateral hilar adenopathy syndrome 06/03/2013    Sepsis without acute organ dysfunction ([address] 12/25/2012     Past Medical History:   Diagnosis Date    Ambulatory dysfunction     walker for long distances, SOB with distance    Aortic stenosis     echo 2017    Arthritis     hands and hips    Asthma     Bilateral hilar adenopathy syndrome 6/3/2013    CAD (coronary artery disease)     Dr. Josie Goode Cardiomyopathy ([facility] 04/19/2019    EF= 43%    CHF (congestive heart failure) (HCC)     Chronic pain     COPD (chronic obstructive pulmonary disease) ([facility]     pulmonology Dr. Tapia [place]    Depression     Diabetes mellitus ([facility]     borderline    Difficult intravenous access     Emphysema of lung ([facility]     ESRD (end stage renal disease) on dialysis ([facility]     MWF    Fear of needles     Gastric ulcer     GERD (gastroesophageal reflux disease)     Heart valve problem     bicuspic valve    Hemodialysis patient ([facility]     History of spinal fracture     work incident    Hx of blood clots     Bilateral lower extremities; stents in place    Hyperlipidemia     Hypertension     MI (myocardial infarction) ([facility] 2019    has had 9 MIs. 2019 was the last    Neuromuscular disorder ([facility]     due to CVA    Numbness and tingling in left arm     from fistula    Pneumonia     PONV (postoperative nausea and vomiting)     Prolonged emergence from general anesthesia     States requires more medication than most people    Sleep apnea     Uses CPAP    Stroke ([facility]     7mm thalamic cva 2017 deficts left side, left side weakness    TIA (transient ischemic attack)     Unspecified diseases of blood and blood-forming organs      Past Surgical History:   Procedure Laterality Date    AORTIC VALVE REPLACEMENT N/A 10/15/2019    TRANSCATHETER AORTIC VALVE REPLACEMENT FEMORAL APPROACH performed by Ramakrishna Manning MD at [facility] Right 7/2/2019    PERITONEAL DIALYSIS CATHETER REMOVAL performed by Daisy Keys MD at [address] [place] [address]  2/29/2015    [facility]    CORONARY ANGIOPLASTY WITH STENT PLACEMENT  05/26/15    CYST REMOVAL  08/14/2013    EXCISION CYSTS, NECK X2 AND ABDOMINAL benign    DIAGNOSTIC CARDIAC CATH LAB PROCEDURE      DIALYSIS FISTULA CREATION Left 10/30/2017    LEFT BRACHIAL CEPHALIC FISTULA    DIALYSIS FISTULA CREATION Left 3/27/2019    LIGATION  AV FISTULA performed by Julio Cesar Rand MD at [facility] OR    ENDOSCOPY, COLON, DIAGNOSTIC      IR TUNNELED CATHETER PLACEMENT GREATER THAN 5 YEARS  3/21/2022    IR TUNNELED CATHETER PLACEMENT GREATER THAN 5 YEARS 3/21/2022 [facility]    IR TUNNELED CATHETER PLACEMENT GREATER THAN 5 YEARS  4/21/2022    IR TUNNELED CATHETER PLACEMENT GREATER THAN 5 YEARS 4/21/2022 [facility]    IR TUNNELED CATHETER PLACEMENT GREATER THAN 5 YEARS  4/26/2022    IR TUNNELED CATHETER PLACEMENT GREATER THAN 5 YEARS 4/26/2022 [facility]    IR TUNNELED CATHETER PLACEMENT GREATER THAN 5 YEARS  6/23/2022    IR TUNNELED CATHETER PLACEMENT GREATER THAN 5 YEARS 6/23/2022 [facility]    OTHER SURGICAL HISTORY  02/01/2017    laparoscopic cholecystectomy with intraoperative cholangiogram    OTHER SURGICAL HISTORY  2018    PORT PLACEMENT  - vas cath    OTHER SURGICAL HISTORY Bilateral 06/26/2018    laprascopic peritoneal dialysis catheter placement    OTHER SURGICAL HISTORY Right 09/2018    peritoneal dialysis port placed on right side of abdomen    OTHER SURGICAL HISTORY  05/28/2019    PTA/Stenting R External Iliac artery    IN LAP INSERTION TUNNELED INTRAPERITONEAL CATHETER N/A 9/21/2018    LAPAROSCOPIC PERITONEAL DIALYSIS CATHETER REPLACEMENT performed by Estelle Choi MD at [address] [facility] [address] 2/24/2022    PERINEAL ABCESS DRAINAGE performed by Estelle Choi MD at [address] [place] [address] N/A 10/2/2022    THORACENTESIS ULTRASOUND performed by Hamilton Luna MD at [facility]  01/06/2016    UPPER GASTROINTESTINAL ENDOSCOPY  01/29/2017    possible candida, otherwise normal appearing    VASCULAR SURGERY  aprx 2 years ago    2 stents placed, each side of groin     Allergies   Allergen Reactions    Morphine Nausea And Vomiting       DATE ONSET: 12/25/2022    Date of Evaluation: 12/27/2022   Evaluating Therapist: JOSE Matute    Chart Reviewed: : [x] Yes [] No    Current Diet: ADULT DIET; Regular; 4 carb choices (60 gm/meal)    Recent Chest Radiography: [] Chest XR   [] CT of Chest  Date: 12/25/2022  Impressions:  Impression   Increased right basilar infiltrate, otherwise similar appearing chest.       Pain: pt does not complain on pain this date    Reason for Referral  Zohreh Wang was referred for a bedside swallow evaluation to assess the efficiency of their swallow function, identify signs and symptoms of aspiration and make recommendations regarding safe dietary consistencies, effective compensatory strategies, and safe eating environment. Assessment    Medical record review/interview: Per MD H&P: \"46 y.o. male who presented to [place] with SOB. Patient missed HD on Friday due to poor weather. This morning, he awoke with severe SOB. Patient wears 4L NC at baseline but turned it up to 6 and still felt very SOB. EMS was called and patient was placed on BIPAP en route to the ED. Patient has had a similar presentation several times related to pulmonary edema from missed HD. \"    Predisposing dysphagia risk factors: Hx of CVA, COPD, and GERD  Clinical signs of possible chronic dysphagia: N/A  Precipitating dysphagia risk factors: reduced physical mobility and increased O2 demands    Patient Complaints:  Odynophagia: [] Yes [x] No  Globus Sensation: [x] Yes [] No  SOB with PO intake: [] Yes [x] No  Increased WOB with PO intake: [] Yes [x] No  Reflux Sx's: [] Yes [x] No  Weight loss: [] Yes [x] No  Coughing/Choking with PO intake: [x] Yes [] No  Reduced Appetite: [] Yes [x] No    Additional Reported Symptoms/Complaints/Hospital Course: Pt states that he has had some swallow difficulties in the past and sometimes \"it seems like he is swallowing a tire. \" Pt states that it is not on certain foods and he is not always aware of when it will happen.     Vitals/labs:   Temp: 98  SpO2: 100  RR: 19  BP: 127/49  HR: 65  O2 device: Nasal cannula     CBC: Recent Labs     12/27/22  0401   WBC 8.5   HGB 8.3*         BMP:  Recent Labs     12/27/22  0401   *   K 4.5   CL 94*   CO2 24   BUN 42*   CREATININE 3.3*   GLUCOSE 112*       Cranial nerve exam:   CN V (trigeminal): ophthalmic, maxillary, and mandibular facial sensation- WNL  CN VII (facial): WNL  CN IX/X (glossopharyngeal/vagus): vocal quality: hoarse and weak; cough: Non-Productive and Dry  CN XII (hypoglossal): WNL    Laryngeal function exam:   Secretions: None  Vocal quality: See CN exam above  MPT: See CN exam above  Pitch range: See CN exam above  Cough: See CN exam above    Oral Care Status:    [x] Oral Care [facility]  [] Poor oral care status  [] Edentulous  [] Upper Dentures  [] Lower Dentures  [] Missing/Broken Teeth  [] Evidence of dental cavities/carries    PO trials:   IDDSI 0 (thin): Pt able to tolerate TL via indpe cup sip in 5/5 trials this date without overt s/s of aspiration. Pt demonstrated appearance of timely swallow, no anterior loss, and no throat clearing/coughing after intake. Pt stated \"no problems with liquids. \"    IDDSI 6 (soft and bite sized): Pt able to toelrate morning meal soft and bite sized egg omelete with indep cutting into smaller bites, indep feeding. Suspect A-P transit, no naterior loss, and gross clearance of oral cavity. Pt demonstrated x1 voice change due to talking with RN during mastication time. Pt demonstrated no overt s/s of aspiration, but x1 voice change noted while intaking PO.    IDDSI 7 (regular): Pt able to intake reg PO w/out overt s/s of aspiration. Pt able to swallow morning meds whole with TL w/out overt s/s of aspiration. Pt denied globus sensation. PT demonstrating appearance of timely swallow. 3 oz water: PASS    Impressions:      Recommendations:  Diet recommendation: IDDSI 7 Regular Solids; IDDSI 0 Thin Liquids;  Meds whole with thin liquids  Instrumentation: Not warranted at this time  Risk management: upright for all intake, stay upright for at least 30 mins after intake, small bites/sips, downgrade to mildly thick if s/s of aspiration develop, alternate bites/sips, slow rate of intake, general GERD precautions, general aspiration precautions, and hold PO and contact SLP if s/s of aspiration or worsening respiratory status develop. Prognosis: Fair - Good    Recommended Intervention:   [x] Dysphagia tx  [] Videostroboscopy                      [] NPO   [] MBS       [] Speech/Cog Eval    [] Therapeutic PO Trials     [] Ice Chips   [] Other:  [] FEES                                                 Dysphagia Therapeutic Intervention:   []  Bolus control Exercises  []  Oral Motor Exercises  []  Downing Water Protocol  []  Thermal Stimulation  []  Oral Care    []  Vital Stim/NMES  []  Laryngeal Exercises  [x]  Patient/Family Education  []  Pharyngeal Exercises  []  Therapeutic PO trials with SLP  [x]  Diet tolerance monitoring  []  Other:     Referrals:  [] ENT    [] PT  [] Pulmonology [] GI  [] Neurology  [] RD  [] OT   []     Goals:  Short Term Goals:  Timeframe for Short Term Goals: (5 days 1/1/2022)  Goal 1: The patient will tolerate recommended diet with no clinical s/s of aspiration 5/5  Goal 2: The patient will recall/perform recommended compensatory strategies given min cues    Long Term Goals:   Timeframe for Long Term Goals: (7 days 1/3/2022)  Goal 1: The patient will tolerate least restrictive diet with no clinical s/s of aspiration or worsening respiratory/pulmonary status    Treatment:  Skilled instruction completed with patient re: evidenced based practice regarding recommendations and POC, importance of oral care to reduce adverse affects in the event of aspiration, and instruction of recommended compensatory strategies developed based upon clinical exam. Pt able to recall/demonstrate compensatory strategies with min cues.     Pt Education: SLP educated the patient re: Role of SLP, rationale for completion of assessment, results of assessment, and recommendations  Pt Education Response: verbalized understanding, would benefit from ongoing education, and RN aware    Duration/Frequency of Tx: 1-2x/week    Individuals Consulted:   [x]  Patient     []  NP         [x]  RN   []  RD                   []  MD      []  Family Member                        []  PA    []  Other:      Safety Devices / Report:  [x]  All fall risk precautions in place [x]  Safety handoff completed with RN  [x]  Bed alarm in place  [x]  Left in bed     []  Chair alarm in place  []  Left in chair   []  Call light in reach   []  Other: Total Treatment Time / Charges       Time in Time out Total Time / units   Swallow Eval/Tx Time  0825 [id number] 17 mins/ 2 units     Signature:  Harley LAMAR  [id number] [facility]  Speech Language Pathologist  [phone]

## 2022-12-27 NOTE — DISCHARGE SUMMARY
Hospital Medicine Discharge Summary    Patient ID: Jf Cohen      Patient's PCP: Pcp No    Admit Date: 12/25/2022     Discharge Date:   12/27/22     Admitting Provider: Amaris Shah MD     Discharge Provider: Phuc Elizondo MD     Discharge Diagnoses: Active Hospital Problems    Diagnosis     Acute respiratory failure with hypoxemia (HCC) [J96.01]      Priority: Medium    Hyperkalemia [E87.5]      Priority: Medium    Hyponatremia [E87.1]      Priority: Medium    Metabolic acidemia [C33.61]      Priority: Medium    Pulmonary infiltrate [R91.8]      Priority: Medium    Leukocytosis [D72.829]      Priority: Medium    Acute on chronic respiratory failure with hypoxia (HCC) [J96.21]      Priority: Medium    Former smoker [U21.874]      Priority: Medium    Cardiomyopathy ([facility] [I42.9]      Priority: Medium    Asthma-COPD overlap syndrome ([facility] [J44.9]      Priority: Medium    Uncontrolled type 2 diabetes mellitus with hyperglycemia ([facility] [E11.65]     ESRD (end stage renal disease) on dialysis ([facility] [N18.6, Z99.2]     Chronic anemia [D64.9]     ZAINAB on CPAP [G47.33, Z99.89]     Obesity (BMI 30-39. 9) [E66.9]        The patient was seen and examined on day of discharge and this discharge summary is in conjunction with any daily progress note from day of discharge. Hospital Course: The patient is a pleasant 45 Y M with a h/o COPD, HTN, HLD, DM2, CAD, ischemic cardiomyopathy, CHF, CVA, and ESRD on HD. The patient admits that in the past he would \"frequently\" skip outpatient HD appointments and cut short most of the sessions he did attend. This resulted in many admissions for volume overload. He says that he has tried to be much more compliant over the last 6 months. He missed an appointment in 10/2022 because his ride didn't show up, then he missed another appointment on thanksgiving, but otherwise he is adamant that he has been doing much better.     He apparently couldn't get out of his house because of the snow, so he missed dialysis this time again. Apparently he couldn't get outpatient HD the following day (christmas) so he went to an ED with shortness of breath and hypoxia and was transferred to [facility] for ultrafiltration. Apparently there was not HD RN available on christmas, so he actually was admitted to the [facility] ICU for CRRT until the following day. Volume overload due to ESRD and acute on chronic systolic CHF. Volume removal with HD. Encouraged ongoing efforts toward sticking to his usual outpatient HD schedule. Possible RLL pneumonia. He did claim to have a cough the last few days, and he had a leukocytosis on admission which resolved with zosyn. De-escalated to doxycycline and will treat through 12/31. Flu and COVID negative. Repeat CXR with PCP in a couple months to verify resolution of the airspace disease. Acute on chronic hypoxic respiratory failure, due to above issues. Weaned to baseline 3-4L NC. Obesity and immobility are also contributing. Anemia of chronic disease. Due to ESRD. Trend Hb. No evidence of blood loss. CAD  - continue home plavix, statin, metoprolol     HLD  - continue statin     H/O DVT  - continue eliquis          Physical Exam Performed:     BP (!) 127/49   Pulse 63   Temp 98 °F (36.7 °C) (Oral)   Resp 15   Wt 227 lb 4.7 oz (103.1 kg)   SpO2 100%   BMI 33.57 kg/m²       General appearance: No apparent distress, appears stated age and cooperative. HEENT: Pupils equal, round, and reactive to light. Conjunctivae/corneas clear. Neck: Supple, with full range of motion. No jugular venous distention. Trachea midline. Respiratory:  Normal respiratory effort. Clear to auscultation, bilaterally without Rales/Wheezes/Rhonchi. Diminished throughout. Cardiovascular: Regular rate and rhythm with normal S1/S2 without murmurs, rubs or gallops. Abdomen: Soft, non-tender, non-distended with normal bowel sounds.   Musculoskeletal: No clubbing, cyanosis. Only trace pedal pitting edema. Full range of motion without deformity. Skin: Skin color, texture, turgor normal.  No rashes or lesions. Neurologic:  Neurovascularly intact without any focal sensory/motor deficits. Cranial nerves: II-XII intact, grossly non-focal.  Psychiatric: Alert and oriented, thought content appropriate, normal insight. Anxious affect. Capillary Refill: Brisk, 3 seconds, normal   Peripheral Pulses: +2 palpable, equal bilaterally       Labs: For convenience and continuity at follow-up the following most recent labs are provided:      CBC:    Lab Results   Component Value Date/Time    WBC 8.5 12/27/2022 04:01 AM    HGB 8.3 12/27/2022 04:01 AM    HCT 25.2 12/27/2022 04:01 AM     12/27/2022 04:01 AM       Renal:    Lab Results   Component Value Date/Time     12/27/2022 04:01 AM    K 4.5 12/27/2022 04:01 AM    K 6.1 12/25/2022 05:00 AM    CL 94 12/27/2022 04:01 AM    CO2 24 12/27/2022 04:01 AM    BUN 42 12/27/2022 04:01 AM    CREATININE 3.3 12/27/2022 04:01 AM    CALCIUM 8.1 12/27/2022 04:01 AM    PHOS 4.7 12/27/2022 04:01 AM         Significant Diagnostic Studies    Radiology:   No orders to display          Consults:     IP CONSULT TO PULMONOLOGY  IP CONSULT TO PULMONOLOGY    Disposition:  home     Condition at Discharge: Stable    Discharge Instructions/Follow-up:  Follow up with PCP within 1-2 weeks.        Code Status:  Full Code     Activity: activity as tolerated    Diet: renal diet      Discharge Medications:     Current Discharge Medication List             Details   doxycycline hyclate (VIBRA-TABS) 100 MG tablet Take 1 tablet by mouth every 12 hours for 4 days  Qty: 8 tablet, Refills: 0                Details   docusate sodium (DOK) 100 MG capsule Take 1 capsule by mouth as needed for Constipation      sennosides-docusate sodium (SENOKOT-S) 8.6-50 MG tablet Take 1 tablet by mouth as needed for Constipation    Associated Diagnoses: Constipation, unspecified constipation type      apixaban (ELIQUIS) 2.5 MG TABS tablet Take 1 tablet by mouth 2 times daily  Qty: 60 tablet, Refills: 0      gabapentin (NEURONTIN) 100 MG capsule Take 2 capsules by mouth 3 times daily as needed (neuropathic pain) for up to 10 days. Qty: 60 capsule, Refills: 0    Associated Diagnoses: Dialysis patient, noncompliant ([facility]      metoprolol succinate (TOPROL XL) 100 MG extended release tablet Take 1 tablet by mouth in the morning and at bedtime  Qty: 30 tablet, Refills: 3      cyclobenzaprine (FLEXERIL) 5 MG tablet Muscle spasms    Associated Diagnoses: Chronic pain syndrome      QUEtiapine (SEROQUEL) 50 MG tablet TAKE 1 TABLET BY MOUTH EVERY EVENING  Qty: 30 tablet, Refills: 10    Associated Diagnoses: Insomnia, unspecified type; Mood disorder (HCC)      isosorbide dinitrate (ISORDIL) 20 MG tablet Take 1 tablet by mouth 3 times daily  Qty: 90 tablet, Refills: 3      clopidogrel (PLAVIX) 75 MG tablet Take 1 tablet by mouth daily  Qty: 90 tablet, Refills: 3      pantoprazole (PROTONIX) 40 MG tablet TAKE 1 TABLET BY MOUTH EVERY MORNING BEFORE BREAKFAST  Qty: 30 tablet, Refills: 10    Associated Diagnoses: Gastroesophageal reflux disease without esophagitis      DULoxetine (CYMBALTA) 30 MG extended release capsule TAKE 1 CAPSULE BY MOUTH EVERY DAY  Qty: 30 capsule, Refills: 10    Associated Diagnoses: Depression, unspecified depression type      pravastatin (PRAVACHOL) 40 MG tablet Take 1 tablet by mouth daily  Qty: 90 tablet, Refills: 3      B Complex-C-Folic Acid (VIRT-CAPS) 1 MG CAPS TAKE 1 CAPSULE BY MOUTH EVERY DAY  Qty: 90 capsule, Refills: 1      Calcium Acetate, Phos Binder, 667 MG CAPS TAKE 1 CAPSULE BY MOUTH THREE TIMES DAILY WITH MEALS  Qty: 90 capsule, Refills: 3      nitroGLYCERIN (NITROSTAT) 0.4 MG SL tablet DISSOLVE 1 TABLET UNDER THE TONGUE AS NEEDED FOR CHEST PAIN EVERY 5 MINUTES UP TO 3 TIMES. IF NO RELIEF CALL 911.   Qty: 25 tablet, Refills: 10    Associated Diagnoses: Coronary artery disease involving native heart without angina pectoris, unspecified vessel or lesion type      vitamin D (ERGOCALCIFEROL) 12768 units CAPS capsule TK 1 C PO WEEKLY  Refills: 11      Alcohol Swabs PADS USE AS DIRECTED  Qty: 300 each, Refills: 3      ipratropium-albuterol (DUONEB) 0.5-2.5 (3) MG/3ML SOLN nebulizer solution Inhale 3 mLs into the lungs every 6 hours as needed for Shortness of Breath  Qty: 360 mL, Refills: 1      calcium carbonate (TUMS) 500 MG chewable tablet Take 1 tablet by mouth 3 times daily as needed for Heartburn. Time Spent on discharge: 33 mins in the examination, evaluation, counseling and review of medications and discharge plan. Signed:    Kassie Arellano MD   12/27/2022      Thank you Pcp No for the opportunity to be involved in this patient's care. If you have any questions or concerns, please feel free to contact me at [phone].

## 2022-12-27 NOTE — PROGRESS NOTES
Pt d/c'd home. Removed peripheral IV and stopped bleeding. Catheter intact. Pt tolerated well. No redness noted at site. Notified [facility] and removed tele box. Reviewed d/c instructions, home meds, and  f/u information utilizing teach-back method. Medications given to patient. Patient verbalized understanding. Patient discharge completed. Discharge information included information on diagnosis including signs and symptoms, complications and when to seek medical attention. Information on new medications also provided included use for the medication, side effects and when to call the doctor. Patient verbalized understanding of all discharge information. Patient escorted out by staff with all documented belongings.  Home with

## 2022-12-27 NOTE — PROGRESS NOTES
Writer very familiar with patient from several previous admission. Patient has a history of non-compliance with dialysis and CHF management. Has made efforts in the recent past to be more compliant. Missed dialysis this time due to weather/road conditions. Pt spent time in ICU this admission on CRRT as a result. Stable for discharge at this time. After chart review it appears pt has been fired from his primary care provider as a result of the non-compliance. Spoke to CM regarding this. She is going to connect pt with the [facility] as well as continue [facility] for patient. Also sent message to Care Plan committee to revisit pt and develop a plan of care to support patient in a better manor.    Daysi Garcia RN

## 2022-12-27 NOTE — PROGRESS NOTES
Shift: [id number]    Admitting diagnosis: Acute Respiratory failure    Presentation to hospital: presented to [facility] ED on 12/25 w/ c/o SOB. Missed Friday dialysis appointment ([facility]USA[facility] quinones) Creat 9.0, K. 6.1, trop 0.15, and BNP >70,000. Transfer to [facility] ICU for emergent dialysis. Surgery: no     Nursing assessment at handoff  stable    Emergency Contact/POA:Spouse, shirley  Family updated: yes     Most recent vitals: /60   Pulse 52   Temp 98 °F (36.7 °C) (Oral)   Resp 18   Wt 227 lb 4.7 oz (103.1 kg)   SpO2 95%   BMI 33.57 kg/m²      Rhythm: Normal Sinus Rhythm 50-70's     NC/HFNC- 3 lpm, bipap at night  Respiratory support: - No ventilator support    Vent days: Day 0    Increased O2 requirements: no    Admission weight Weight: 232 lb 9.4 oz (105.5 kg)  Today's weight   Wt Readings from Last 1 Encounters:   12/27/22 227 lb 4.7 oz (103.1 kg)         UOP >30ml/hr: no, anuric    Wharton need assessed each shift: no    Restraints: no  Order current and documentation up to date? Lines/Drains  LDA Insertion Date Discontinued Date Dressing Changes   PIV       TLC       Arterial       Wharton       Vas Cath      ETT       Surgical drains        Night Shift Hospitalist Interventions    Problem(Brief) Date Time Intervention Physician contacted                                               Drip rates at handoff:    sodium chloride      dextrose         Hospital Course Daily Updates:  Admit Day# 1 Nights  -transitioned back to HD on day shift  -noncompliant with insulin, educated pt.  on importance  -VSS  -3L NC, bipap at night  -move out orders/ likely discharge home 12/27        Lab Data:   CBC:   Recent Labs     12/26/22  0202 12/27/22  0401   WBC 9.6 8.5   HGB 7.4* 8.3*   HCT 22.2* 25.2*   MCV 91.0 91.3    326     BMP:    Recent Labs     12/26/22  0935 12/27/22  0401   * 131*   K 4.7 4.5   CO2 24 24   BUN 44* 42*   CREATININE 3.0* 3.3*     LIVR:   Recent Labs     12/25/22  0500   AST 11* ALT 11     PT/INR:   Recent Labs     12/25/22  0500   PROT 7.5     APTT: No results for input(s): APTT in the last 72 hours. ABG: No results for input(s): PHART, IIX7WLY, PO2ART in the last 72 hours.   Consults (if GI or Nephrology- which group?)-      nephrology ([facility] & [facility] center) Wounds

## 2022-12-27 NOTE — CARE COORDINATION
CASE MANAGEMENT DISCHARGE SUMMARY      Discharge to: Home with [facility] and referral to [facility] for no PCP and to follow for home care orders until established with new PCP        IMM given: (date) Today    [address] ordered/agency: none    Transportation:    Family/car:on arrival       Confirmed discharge plan with:MD/ Alexus [id number] to confirm ride to  tomorrow, Marcelino Katz RN and [facility].       Patient: yes     Family:  yes   Name: Antonio Mosqueda Contact number:[phone]     Facility/Agency, name:  Leopoldo Rosella from [facility] received all orders via epic        RN, name: Marcelino Katz

## 2022-12-27 NOTE — PROGRESS NOTES
12/26/22 2034   NIV Type   $NIV $Daily Charge   Skin Assessment Clean, dry, & intact   Skin Protection for O2 Device No  (pt refusing)   NIV Started/Stopped On   Equipment Type v60   Mode Bilevel   Mask Type Full face mask   Mask Size Medium   Settings/Measurements   PIP Observed 15 cm H20   IPAP 15 cmH20   CPAP/EPAP 8 cmH2O   Vt (Measured) 602 mL   Rate Ordered 12   Resp 22   FiO2  30 %   I Time/ I Time % 1 s   Minute Volume (L/min) 16.6 Liters   Mask Leak (lpm) 40 lpm   Comfort Level Good   Using Accessory Muscles No   SpO2 98   Breath Sounds   Right Upper Lobe Diminished   Right Middle Lobe Diminished   Right Lower Lobe Diminished   Left Upper Lobe Diminished   Left Lower Lobe Diminished   Alarm Settings   Alarms On Y   Low Pressure (cmH2O) 6 cmH2O   High Pressure (cmH2O) 30 cmH2O   Apnea (secs) 20 secs   RR Low (bpm) 6   RR High (bpm) 40 br/min

## 2022-12-27 NOTE — PROGRESS NOTES
12/26/22 2335   NIV Type   Skin Assessment Clean, dry, & intact   Skin Protection for O2 Device No  (patient refused)   Equipment Type v60   Mode Bilevel   Mask Type Full face mask   Mask Size Medium   Settings/Measurements   IPAP 15 cmH20   CPAP/EPAP 8 cmH2O   Vt (Measured) 676 mL   Resp 20   FiO2  30 %   I Time/ I Time % 1 s   Minute Volume (L/min) 14.8 Liters   Mask Leak (lpm) 40 lpm   Comfort Level Good   Using Accessory Muscles No   SpO2 98   Patient's Home Machine No

## 2022-12-29 ENCOUNTER — FOLLOWUP TELEPHONE ENCOUNTER (OUTPATIENT)
Dept: TELEMETRY | Age: 52
End: 2022-12-29

## 2022-12-29 LAB
BLOOD CULTURE, ROUTINE: NORMAL
CULTURE, BLOOD 2: NORMAL

## 2022-12-29 NOTE — TELEPHONE ENCOUNTER
1st Attempt; No Answer- Left HIPAA compliant voicemail with Non-Urgent Heart Failure Resource Line number for call back.     Donna Vail RN

## 2022-12-30 ENCOUNTER — FOLLOWUP TELEPHONE ENCOUNTER (OUTPATIENT)
Dept: TELEMETRY | Age: 52
End: 2022-12-30

## 2022-12-30 NOTE — TELEPHONE ENCOUNTER
Third and final attempt at hospital follow up. No answer and VM full. Unable to leave .  Keyana Cosby RN

## 2022-12-30 NOTE — TELEPHONE ENCOUNTER
Second attempt at hospital follow up. VM box full and unable to leave message. Will attempt again at later time.  Navi Larson RN

## 2023-01-05 ENCOUNTER — TELEPHONE (OUTPATIENT)
Dept: CARDIOTHORACIC SURGERY | Age: 53
End: 2023-01-05

## 2023-01-05 NOTE — TELEPHONE ENCOUNTER
Patient missed appointment w/ Dr. Joyce Tellez on 1/3/23. Called patient on day of appt (1/3/23) to check in, no answer, LVM to call back. Called patient again today to determine if he would like to r/s. Phone goes straight to . LVM for patient to call office w/ #.

## 2023-01-28 ENCOUNTER — APPOINTMENT (OUTPATIENT)
Dept: GENERAL RADIOLOGY | Age: 53
DRG: 640 | End: 2023-01-28
Payer: MEDICARE

## 2023-01-28 ENCOUNTER — HOSPITAL ENCOUNTER (EMERGENCY)
Age: 53
Discharge: HOME | DRG: 640 | End: 2023-01-29
Attending: EMERGENCY MEDICINE
Payer: MEDICARE

## 2023-01-28 DIAGNOSIS — I16.0 HYPERTENSIVE URGENCY: Primary | ICD-10-CM

## 2023-01-28 LAB
BASE EXCESS VENOUS: -2.1 MMOL/L (ref -3–3)
BASOPHILS ABSOLUTE: 0.1 K/UL (ref 0–0.2)
BASOPHILS RELATIVE PERCENT: 0.4 %
CARBOXYHEMOGLOBIN: 1.3 % (ref 0–1.5)
EOSINOPHILS ABSOLUTE: 0.2 K/UL (ref 0–0.6)
EOSINOPHILS RELATIVE PERCENT: 1.4 %
HCO3 VENOUS: 24.7 MMOL/L (ref 23–29)
HCT VFR BLD CALC: 32 % (ref 40.5–52.5)
HEMOGLOBIN: 10.4 G/DL (ref 13.5–17.5)
LACTIC ACID, SEPSIS: 1.8 MMOL/L (ref 0.4–1.9)
LYMPHOCYTES ABSOLUTE: 0.8 K/UL (ref 1–5.1)
LYMPHOCYTES RELATIVE PERCENT: 5 %
MCH RBC QN AUTO: 30.6 PG (ref 26–34)
MCHC RBC AUTO-ENTMCNC: 32.5 G/DL (ref 31–36)
MCV RBC AUTO: 93.9 FL (ref 80–100)
METHEMOGLOBIN VENOUS: 0.5 %
MONOCYTES ABSOLUTE: 0.8 K/UL (ref 0–1.3)
MONOCYTES RELATIVE PERCENT: 5.3 %
NEUTROPHILS ABSOLUTE: 13.6 K/UL (ref 1.7–7.7)
NEUTROPHILS RELATIVE PERCENT: 87.9 %
O2 SAT, VEN: 50 %
O2 THERAPY: ABNORMAL
PCO2, VEN: 51.2 MMHG (ref 40–50)
PDW BLD-RTO: 16.9 % (ref 12.4–15.4)
PH VENOUS: 7.3 (ref 7.35–7.45)
PLATELET # BLD: 370 K/UL (ref 135–450)
PMV BLD AUTO: 7.1 FL (ref 5–10.5)
PO2, VEN: 31 MMHG (ref 25–40)
RBC # BLD: 3.41 M/UL (ref 4.2–5.9)
TCO2 CALC VENOUS: 26 MMOL/L
WBC # BLD: 15.4 K/UL (ref 4–11)

## 2023-01-28 PROCEDURE — 85025 COMPLETE CBC W/AUTO DIFF WBC: CPT

## 2023-01-28 PROCEDURE — 87040 BLOOD CULTURE FOR BACTERIA: CPT

## 2023-01-28 PROCEDURE — 36415 COLL VENOUS BLD VENIPUNCTURE: CPT

## 2023-01-28 PROCEDURE — 94660 CPAP INITIATION&MGMT: CPT

## 2023-01-28 PROCEDURE — 93005 ELECTROCARDIOGRAM TRACING: CPT | Performed by: EMERGENCY MEDICINE

## 2023-01-28 PROCEDURE — 83880 ASSAY OF NATRIURETIC PEPTIDE: CPT

## 2023-01-28 PROCEDURE — 99285 EMERGENCY DEPT VISIT HI MDM: CPT

## 2023-01-28 PROCEDURE — 71045 X-RAY EXAM CHEST 1 VIEW: CPT

## 2023-01-28 PROCEDURE — 82803 BLOOD GASES ANY COMBINATION: CPT

## 2023-01-28 PROCEDURE — 80053 COMPREHEN METABOLIC PANEL: CPT

## 2023-01-28 PROCEDURE — 83605 ASSAY OF LACTIC ACID: CPT

## 2023-01-28 PROCEDURE — 84484 ASSAY OF TROPONIN QUANT: CPT

## 2023-01-28 ASSESSMENT — PAIN SCALES - GENERAL: PAINLEVEL_OUTOF10: 5

## 2023-01-28 ASSESSMENT — PAIN - FUNCTIONAL ASSESSMENT: PAIN_FUNCTIONAL_ASSESSMENT: 0-10

## 2023-01-29 ENCOUNTER — APPOINTMENT (OUTPATIENT)
Dept: CT IMAGING | Age: 53
DRG: 640 | End: 2023-01-29
Payer: MEDICARE

## 2023-01-29 ENCOUNTER — HOSPITAL ENCOUNTER (INPATIENT)
Age: 53
LOS: 8 days | Discharge: HOME | DRG: 640 | End: 2023-02-06
Attending: EMERGENCY MEDICINE | Admitting: INTERNAL MEDICINE
Payer: MEDICARE

## 2023-01-29 VITALS
RESPIRATION RATE: 14 BRPM | WEIGHT: 227 LBS | BODY MASS INDEX: 33.62 KG/M2 | TEMPERATURE: 98.1 F | HEIGHT: 69 IN | SYSTOLIC BLOOD PRESSURE: 158 MMHG | OXYGEN SATURATION: 100 % | DIASTOLIC BLOOD PRESSURE: 85 MMHG | HEART RATE: 99 BPM

## 2023-01-29 DIAGNOSIS — I16.1 HYPERTENSIVE EMERGENCY: ICD-10-CM

## 2023-01-29 DIAGNOSIS — J96.00 ACUTE RESPIRATORY FAILURE, UNSPECIFIED WHETHER WITH HYPOXIA OR HYPERCAPNIA (HCC): Primary | ICD-10-CM

## 2023-01-29 LAB
A/G RATIO: 1.1 (ref 1.1–2.2)
A/G RATIO: 1.1 (ref 1.1–2.2)
ALBUMIN SERPL-MCNC: 3.7 G/DL (ref 3.4–5)
ALBUMIN SERPL-MCNC: 4 G/DL (ref 3.4–5)
ALP BLD-CCNC: 70 U/L (ref 40–129)
ALP BLD-CCNC: 73 U/L (ref 40–129)
ALT SERPL-CCNC: 8 U/L (ref 10–40)
ALT SERPL-CCNC: 9 U/L (ref 10–40)
ANION GAP SERPL CALCULATED.3IONS-SCNC: 16 MMOL/L (ref 3–16)
ANION GAP SERPL CALCULATED.3IONS-SCNC: 19 MMOL/L (ref 3–16)
AST SERPL-CCNC: 11 U/L (ref 15–37)
AST SERPL-CCNC: 12 U/L (ref 15–37)
BASOPHILS ABSOLUTE: 0.1 K/UL (ref 0–0.2)
BASOPHILS RELATIVE PERCENT: 0.7 %
BILIRUB SERPL-MCNC: <0.2 MG/DL (ref 0–1)
BILIRUB SERPL-MCNC: <0.2 MG/DL (ref 0–1)
BUN BLDV-MCNC: 50 MG/DL (ref 7–20)
BUN BLDV-MCNC: 63 MG/DL (ref 7–20)
CALCIUM SERPL-MCNC: 9.1 MG/DL (ref 8.3–10.6)
CALCIUM SERPL-MCNC: 9.4 MG/DL (ref 8.3–10.6)
CHLORIDE BLD-SCNC: 90 MMOL/L (ref 99–110)
CHLORIDE BLD-SCNC: 92 MMOL/L (ref 99–110)
CO2: 20 MMOL/L (ref 21–32)
CO2: 25 MMOL/L (ref 21–32)
CREAT SERPL-MCNC: 6.4 MG/DL (ref 0.9–1.3)
CREAT SERPL-MCNC: 7.8 MG/DL (ref 0.9–1.3)
EKG ATRIAL RATE: 129 BPM
EKG DIAGNOSIS: NORMAL
EKG P AXIS: 69 DEGREES
EKG P-R INTERVAL: 192 MS
EKG Q-T INTERVAL: 316 MS
EKG QRS DURATION: 102 MS
EKG QTC CALCULATION (BAZETT): 462 MS
EKG R AXIS: 40 DEGREES
EKG T AXIS: 60 DEGREES
EKG VENTRICULAR RATE: 129 BPM
EOSINOPHILS ABSOLUTE: 0.1 K/UL (ref 0–0.6)
EOSINOPHILS RELATIVE PERCENT: 1 %
GFR SERPL CREATININE-BSD FRML MDRD: 10 ML/MIN/{1.73_M2}
GFR SERPL CREATININE-BSD FRML MDRD: 8 ML/MIN/{1.73_M2}
GLUCOSE BLD-MCNC: 172 MG/DL (ref 70–99)
GLUCOSE BLD-MCNC: 180 MG/DL (ref 70–99)
HCT VFR BLD CALC: 30.1 % (ref 40.5–52.5)
HEMOGLOBIN: 9.5 G/DL (ref 13.5–17.5)
INFLUENZA A: NOT DETECTED
INFLUENZA B: NOT DETECTED
LACTIC ACID: 1 MMOL/L (ref 0.4–2)
LYMPHOCYTES ABSOLUTE: 0.6 K/UL (ref 1–5.1)
LYMPHOCYTES RELATIVE PERCENT: 4.1 %
MCH RBC QN AUTO: 29.5 PG (ref 26–34)
MCHC RBC AUTO-ENTMCNC: 31.6 G/DL (ref 31–36)
MCV RBC AUTO: 93.5 FL (ref 80–100)
MONOCYTES ABSOLUTE: 0.6 K/UL (ref 0–1.3)
MONOCYTES RELATIVE PERCENT: 4.8 %
NEUTROPHILS ABSOLUTE: 12.1 K/UL (ref 1.7–7.7)
NEUTROPHILS RELATIVE PERCENT: 89.4 %
PDW BLD-RTO: 16.3 % (ref 12.4–15.4)
PLATELET # BLD: 381 K/UL (ref 135–450)
PMV BLD AUTO: 7.6 FL (ref 5–10.5)
POTASSIUM REFLEX MAGNESIUM: 4.3 MMOL/L (ref 3.5–5.1)
POTASSIUM REFLEX MAGNESIUM: 4.4 MMOL/L (ref 3.5–5.1)
PRO-BNP: ABNORMAL PG/ML (ref 0–124)
RBC # BLD: 3.22 M/UL (ref 4.2–5.9)
SARS-COV-2 RNA, RT PCR: NOT DETECTED
SODIUM BLD-SCNC: 129 MMOL/L (ref 136–145)
SODIUM BLD-SCNC: 133 MMOL/L (ref 136–145)
TOTAL PROTEIN: 7.1 G/DL (ref 6.4–8.2)
TOTAL PROTEIN: 7.8 G/DL (ref 6.4–8.2)
TROPONIN: 0.11 NG/ML
TROPONIN: 0.16 NG/ML
WBC # BLD: 13.5 K/UL (ref 4–11)

## 2023-01-29 PROCEDURE — 80053 COMPREHEN METABOLIC PANEL: CPT

## 2023-01-29 PROCEDURE — 93005 ELECTROCARDIOGRAM TRACING: CPT | Performed by: EMERGENCY MEDICINE

## 2023-01-29 PROCEDURE — 85025 COMPLETE CBC W/AUTO DIFF WBC: CPT

## 2023-01-29 PROCEDURE — 84484 ASSAY OF TROPONIN QUANT: CPT

## 2023-01-29 PROCEDURE — 94660 CPAP INITIATION&MGMT: CPT

## 2023-01-29 PROCEDURE — 87636 SARSCOV2 & INF A&B AMP PRB: CPT

## 2023-01-29 PROCEDURE — 2500000003 HC RX 250 WO HCPCS: Performed by: EMERGENCY MEDICINE

## 2023-01-29 PROCEDURE — 2000000000 HC ICU R&B

## 2023-01-29 PROCEDURE — 6370000000 HC RX 637 (ALT 250 FOR IP): Performed by: EMERGENCY MEDICINE

## 2023-01-29 PROCEDURE — 93010 ELECTROCARDIOGRAM REPORT: CPT | Performed by: INTERNAL MEDICINE

## 2023-01-29 PROCEDURE — 2700000000 HC OXYGEN THERAPY PER DAY

## 2023-01-29 PROCEDURE — 96374 THER/PROPH/DIAG INJ IV PUSH: CPT

## 2023-01-29 PROCEDURE — 94640 AIRWAY INHALATION TREATMENT: CPT

## 2023-01-29 PROCEDURE — 6360000002 HC RX W HCPCS: Performed by: EMERGENCY MEDICINE

## 2023-01-29 PROCEDURE — 99285 EMERGENCY DEPT VISIT HI MDM: CPT

## 2023-01-29 PROCEDURE — 71250 CT THORAX DX C-: CPT

## 2023-01-29 PROCEDURE — 83605 ASSAY OF LACTIC ACID: CPT

## 2023-01-29 RX ORDER — NITROGLYCERIN 20 MG/100ML
5-200 INJECTION INTRAVENOUS CONTINUOUS
Status: DISCONTINUED | OUTPATIENT
Start: 2023-01-29 | End: 2023-02-03

## 2023-01-29 RX ORDER — METHYLPREDNISOLONE SODIUM SUCCINATE 40 MG/ML
40 INJECTION, POWDER, LYOPHILIZED, FOR SOLUTION INTRAMUSCULAR; INTRAVENOUS ONCE
Status: COMPLETED | OUTPATIENT
Start: 2023-01-29 | End: 2023-01-29

## 2023-01-29 RX ORDER — IPRATROPIUM BROMIDE AND ALBUTEROL SULFATE 2.5; .5 MG/3ML; MG/3ML
1 SOLUTION RESPIRATORY (INHALATION) ONCE
Status: COMPLETED | OUTPATIENT
Start: 2023-01-29 | End: 2023-01-29

## 2023-01-29 RX ADMIN — NITROGLYCERIN 5 MCG/MIN: 20 INJECTION INTRAVENOUS at 21:59

## 2023-01-29 RX ADMIN — IPRATROPIUM BROMIDE AND ALBUTEROL SULFATE 1 AMPULE: .5; 2.5 SOLUTION RESPIRATORY (INHALATION) at 22:01

## 2023-01-29 RX ADMIN — METHYLPREDNISOLONE SODIUM SUCCINATE 40 MG: 40 INJECTION, POWDER, FOR SOLUTION INTRAMUSCULAR; INTRAVENOUS at 21:57

## 2023-01-29 ASSESSMENT — PAIN - FUNCTIONAL ASSESSMENT: PAIN_FUNCTIONAL_ASSESSMENT: NONE - DENIES PAIN

## 2023-01-29 NOTE — ED NOTES
This RN discussed discharge instructions with pt including follow up. Pt verbalized understanding. Pt stable on 3L baseline O2 and ambulatory waiting for wife to pick him up.       Renae Álvarez RN  01/29/23 4019

## 2023-01-29 NOTE — ED NOTES
Pt taken off bipap.  Pt currently tolerating 5L nasal cannula at 100%      Renae Álvarez RN  01/29/23 4218

## 2023-01-29 NOTE — ED PROVIDER NOTES
Emergency Department Attending Provider Note  Location: [facility]  ED  1/28/2023     Patient Identification  Wu Reyes is a 45 y.o. male      HPI:Igor Ledezma was evaluated in the Emergency Department for shortness of breath. This patient has a history of both COPD and end-stage renal disease. He has a history of multiple visits for shortness of breath. Patient states that he is unable to find out what is causing this. Patient states that he did go to dialysis on Friday. Shortness of breath was acute. Although he did come by EMS and was placed on CPAP patient was able to provide history on his own. Patient denies any fever. He has no chest pain. Although initial history and physical exam information was obtained by the resident [phone] (who also dictated a record of this visit), I personally saw the patient and performed a substantive portion of the visit including all aspects of the medical decision making. PHYSICAL EXAM:  Adult male in acute respiratory distress, he is nontoxic-appearing, patient is hypertensive and tachycardic, no diaphoresis. Heart sounds are regular. Breath sounds are diminished I do not appreciate any wheezing. Does not have any significant peripheral edema. Patient has a fistula in the left upper extremity but I do not appreciate any bruit or thrills. Patient states it is nonfunctional.  Abdomen is soft nondistended and nontender. EKG Interpretation  Sinus tachycardia at a rate of 129 bpm, [place] interval and QTc normal.  Slightly prolonged QRS. No acute ischemic changes. No significant changes when compared to prior EKG December 2022. This EKG was read and interpreted by myself. Patient seen and evaluated. Relevant records reviewed. MDM:  This is an adult male who comes in if acute shortness of breath. The patient has extensive history.   The patient is also had multiple visits to the ER for similar complaints in fact the patient has a care management plan in place. I am concerned for acute pulmonary edema, concern for CHF exacerbation versus fluid overload from missed dialysis, COPD exacerbation is less likely, also little suspicion for acute MI based on patient's presentation. Patient is immediately placed on cardiac monitor blood pressure and pulse oximetry monitoring. He is on 3 L oxygen at baseline. Patient is desaturating at this level. Patient came in on CPAP and was placed on BiPAP here. Laboratory studies chest x-ray and EKG were ordered. EKG shows no acute ischemic findings, is initially tachycardic. Cardiac monitor as read and interpreted myself initially showed sinus tachycardia however upon reassessment the patient's cardiac monitor showed normal sinus rhythm. Patient has leukocytosis with neutrophilic predominance, this is likely due to D marginalization as the patient has no symptoms of pneumonia and his chest x-ray also was negative for any findings concerning for pneumonia. He has stable normocytic anemia. Patient's creatinine is elevated and so to is his BUN likely secondary to patient's history of end-stage renal disease. Patient is in hemodialysis. He is not hyperkalemic. Troponin is 0.011 which is near actually lower than the patient's previous studies. His lactic acid is normal lowering suspicion for infection of severe prolonged hypoxemia. His BNP is again elevated. PCO2 is not significantly elevated. He is slightly acidotic. Chest x-ray shows stable cardiomegaly and actually improvement of his pulmonary congestion. The patient was kept on BiPAP for a couple of hours. The patient actually states that he feels much better. He is weaned off the BiPAP as his blood pressure and heart rate improved. The patient is able to tolerate his 3 L nasal cannula and is actually asking to be discharged. Reviewing the patient's chart and care plan I believe that this is reasonable.   The patient states that he has not missed dialysis and he is able to get a ride to his dialysis session. Patient is encouraged to return to the emergency room should he have any acute worsening of his symptoms. He expresses understanding and is agreeable to treatment plan. Patient has oxygen at home and he is able to call for transportation who was able to bring him his oxygen. Patient symptoms are likely due to rapid rise in his blood pressure which might have caused a strain in the patient's heart resulted in his acute shortness of breath. The use of BiPAP led to resolution of the patient's symptoms as his blood pressure and heart rate improved decreasing cardiac demand. CLINICAL IMPRESSION  1. Hypertensive urgency            I personally saw the patient and independently provided 45 minutes of non-concurrent critical care out of the total shared critical care time provided. This chart was generated in part by using Dragon Dictation system and may contain errors related to that system including errors in grammar, punctuation, and spelling, as well as words and phrases that may be inappropriate. If there are any questions or concerns please feel free to contact the dictating provider for clarification. Kee Bynum MD  I am the primary clinician of record.    [address] [facility]      Kee Bynum MD  01/29/23 8284

## 2023-01-29 NOTE — PROGRESS NOTES
01/28/23 2243   NIV Type   Skin Assessment Clean, dry, & intact   Skin Protection for O2 Device Yes   Orientation Middle   Location Nose   NIV Started/Stopped On   Equipment Type v60   Mode Bilevel   Mask Type Full face mask   Mask Size Large   Settings/Measurements   IPAP 12 cmH20   CPAP/EPAP 6 cmH2O   Vt (Measured) 664 mL   Resp 28   FiO2  35 %  (patient wears 3 lpm @ home)   Minute Volume (L/min) 10.2 Liters   Mask Leak (lpm) 55 lpm  (facial hair)   Comfort Level Good   Using Accessory Muscles No   SpO2 98   Patient's Home Machine No

## 2023-01-29 NOTE — DISCHARGE INSTRUCTIONS
Please ensure that you are taking all of your medications as prescribed and follow-up with your primary care provider if you do not have a primary care provider referral to 1 was given. Also follow-up with your nephrologist and keep your scheduled dialysis session.

## 2023-01-29 NOTE — ED PROVIDER NOTES
[facility]  ED  [address]        Patient Name: Domenico Evans  MRN: [id number]  [place] 1970  Date of evaluation: 1/28/2023  Provider: Vania Casey MD  PCP: Pcp No  Note Started: 10:57 PM EST 1/28/23    CHIEF COMPLAINT       Shortness of Breath (Pt SOB, dialysis pt. On 3L at home. 96% at time of arrival.  )      HISTORY OF PRESENT ILLNESS: 1 or more Elements     History from : Patient    Limitations to history : None    Domenico Evans is a 45 y.o. male who presents SOB    - acute SOB several hours ago  - baseline 3L O2 at home but increased to 6L, still SOB therefore presented to ED  - hx of HF, COPD, ESRD on dialysis (MWF)  - pt reports that he had dialysis on Friday  - of note, RN reports that the patient is well known to this ED and has reported to be a poor historian often missing dialysis session  - reports some chest discomfort but no pain  - denies f/c/n/v/abd pain    Nursing Notes were all reviewed and agreed with or any disagreements were addressed in the HPI. REVIEW OF SYSTEMS :      Positives and Pertinent negatives as per HPI.      SURGICAL HISTORY     Past Surgical History:   Procedure Laterality Date    AORTIC VALVE REPLACEMENT N/A 10/15/2019    TRANSCATHETER AORTIC VALVE REPLACEMENT FEMORAL APPROACH performed by Erica Snow MD at [facility] Right 7/2/2019    PERITONEAL DIALYSIS CATHETER REMOVAL performed by Lanette Santos MD at [address] [place] [address]  2/29/2015    WNL    CORONARY ANGIOPLASTY WITH STENT PLACEMENT  05/26/15    CYST REMOVAL  08/14/2013    EXCISION CYSTS, NECK X2 AND ABDOMINAL benign    DIAGNOSTIC CARDIAC CATH LAB PROCEDURE      DIALYSIS FISTULA CREATION Left 10/30/2017    LEFT BRACHIAL CEPHALIC FISTULA    DIALYSIS FISTULA CREATION Left 3/27/2019    LIGATION  AV FISTULA performed by Suzanne Campuzano MD at [id number] [place] [address], DIAGNOSTIC      IR TUNNELED CATHETER PLACEMENT GREATER THAN 5 YEARS  3/21/2022    IR TUNNELED CATHETER PLACEMENT GREATER THAN 5 YEARS 3/21/2022 [facility]    IR TUNNELED CATHETER PLACEMENT GREATER THAN 5 YEARS  4/21/2022    IR TUNNELED CATHETER PLACEMENT GREATER THAN 5 YEARS 4/21/2022 [facility]    IR TUNNELED CATHETER PLACEMENT GREATER THAN 5 YEARS  4/26/2022    IR TUNNELED CATHETER PLACEMENT GREATER THAN 5 YEARS 4/26/2022 [facility]    IR TUNNELED CATHETER PLACEMENT GREATER THAN 5 YEARS  6/23/2022    IR TUNNELED CATHETER PLACEMENT GREATER THAN 5 YEARS 6/23/2022 [facility]    OTHER SURGICAL HISTORY  02/01/2017    laparoscopic cholecystectomy with intraoperative cholangiogram    OTHER SURGICAL HISTORY  2018    PORT PLACEMENT  - vas cath    OTHER SURGICAL HISTORY Bilateral 06/26/2018    laprascopic peritoneal dialysis catheter placement    OTHER SURGICAL HISTORY Right 09/2018    peritoneal dialysis port placed on right side of abdomen    OTHER SURGICAL HISTORY  05/28/2019    PTA/Stenting R External Iliac artery    CA LAP INSERTION TUNNELED INTRAPERITONEAL CATHETER N/A 9/21/2018    LAPAROSCOPIC PERITONEAL DIALYSIS CATHETER REPLACEMENT performed by Kasey Giron MD at [address] 2/24/2022    PERINEAL ABCESS DRAINAGE performed by Kasey Giron MD at [address] [place] [address] N/A 10/2/2022    THORACENTESIS ULTRASOUND performed by Cisco Gastelum MD at [address]  01/06/2016    UPPER GASTROINTESTINAL ENDOSCOPY  01/29/2017    possible candida, otherwise normal appearing    VASCULAR SURGERY  aprx 2 years ago    2 stents placed, each side of groin       CURRENTMEDICATIONS       Discharge Medication List as of 1/29/2023  3:21 AM        CONTINUE these medications which have NOT CHANGED    Details   docusate sodium (DOK) 100 MG capsule Take 1 capsule by mouth as needed for ConstipationDC to SNF sennosides-docusate sodium (SENOKOT-S) 8.6-50 MG tablet Take 1 tablet by mouth as needed for ConstipationDC to [facility]      apixaban (ELIQUIS) 2.5 MG TABS tablet Take 1 tablet by mouth 2 times daily, Disp-60 tablet, R-0NO PRINT      gabapentin (NEURONTIN) 100 MG capsule Take 2 capsules by mouth 3 times daily as needed (neuropathic pain) for up to 10 days. , Disp-60 capsule, R-0Print      metoprolol succinate (TOPROL XL) 100 MG extended release tablet Take 1 tablet by mouth in the morning and at bedtime, Disp-30 tablet, R-3DC to [facility]      cyclobenzaprine (FLEXERIL) 5 MG tablet Muscle spasmsDC to [facility]      QUEtiapine (SEROQUEL) 50 MG tablet TAKE 1 TABLET BY MOUTH EVERY EVENING, Disp-30 tablet, R-10Normal      isosorbide dinitrate (ISORDIL) 20 MG tablet Take 1 tablet by mouth 3 times daily, Disp-90 tablet, R-3Normal      clopidogrel (PLAVIX) 75 MG tablet Take 1 tablet by mouth daily, Disp-90 tablet, R-3Normal      pantoprazole (PROTONIX) 40 MG tablet TAKE 1 TABLET BY MOUTH EVERY MORNING BEFORE BREAKFAST, Disp-30 tablet, R-10Normal      DULoxetine (CYMBALTA) 30 MG extended release capsule TAKE 1 CAPSULE BY MOUTH EVERY DAY, Disp-30 capsule, R-10Normal      pravastatin (PRAVACHOL) 40 MG tablet Take 1 tablet by mouth daily, Disp-90 tablet, R-3Normal      B Complex-C-Folic Acid (VIRT-CAPS) 1 MG CAPS TAKE 1 CAPSULE BY MOUTH EVERY DAY, Disp-90 capsule, R-1Normal      Calcium Acetate, Phos Binder, 667 MG CAPS TAKE 1 CAPSULE BY MOUTH THREE TIMES DAILY WITH MEALS, Disp-90 capsule, R-3Normal      nitroGLYCERIN (NITROSTAT) 0.4 MG SL tablet DISSOLVE 1 TABLET UNDER THE TONGUE AS NEEDED FOR CHEST PAIN EVERY 5 MINUTES UP TO 3 TIMES.  IF NO RELIEF CALL 911., Disp-25 tablet, R-10Normal      vitamin D (ERGOCALCIFEROL) 14935 units CAPS capsule TK 1 C PO WEEKLY, R-11Historical Med      Alcohol Swabs PADS Disp-300 each, R-3, NormalUSE AS DIRECTED      ipratropium-albuterol (DUONEB) 0.5-2.5 (3) MG/3ML SOLN nebulizer solution Inhale 3 mLs into the lungs every 6 hours as needed for Shortness of Breath, Disp-360 mL, R-1Print      calcium carbonate (TUMS) 500 MG chewable tablet Take 1 tablet by mouth 3 times daily as needed for Heartburn. Historical Med             ALLERGIES     Morphine    FAMILYHISTORY       Family History   Problem Relation Age of Onset    Diabetes Mother     Heart Disease Father     Kidney Disease Sister         stage 4-kidney failure    Cancer Sister     Heart Disease Sister     Obesity Sister     Cancer Sister     Heart Disease Sister     Obesity Sister     Alcohol Abuse Brother         SOCIAL HISTORY       Social History     Tobacco Use    Smoking status: Former     Packs/day: 0.50     Years: 33.00     Pack years: 16.50     Types: Cigarettes     Quit date: 2020     Years since quittin.7    Smokeless tobacco: Never    Tobacco comments:     [facility] quit 2021   Vaping Use    Vaping Use: Never used   Substance Use Topics    Alcohol use: Not Currently     Alcohol/week: 0.0 standard drinks     Comment: occ    Drug use: No       SCREENINGS        Robert Coma Scale  Eye Opening: Spontaneous  Best Verbal Response: Oriented  Best Motor Response: Obeys commands  Albin Coma Scale Score: 15                CIWA Assessment  BP: (!) 158/85  Heart Rate: 99           PHYSICAL EXAM  1 or more Elements     ED Triage Vitals   BP Temp Temp Source Heart Rate Resp SpO2 Height Weight   23 2238 23 2238 23 2238 23 2238 23 2238 23 2238 23 22323 2236   (!) 193/134 (!) 96.1 °F (35.6 °C) Oral (!) 131 (!) 34 96 % 5' 9\" (1.753 m) 227 lb (103 kg)       General: No acute distress. Alert and Oriented. Appears stated age. HEENT: No midline cervical spine tenderness. Full ROM of the neck. There is no significant cervical lympadenopathy. No difficulty tolerating oral secretions. Cardiac: Regular rate and rhythm. Radial pulses are intact bilaterally.    Chest: Diminished breath sounds bilaterally, no wheezing, increased work of breathing   Abdomen: Soft, nontender, nondistended, non-peritonitic. Extremities:No significant lower extremity edema. Lower extremities are symmetric. Neuro: Moving all extremities. No focal deficits. Speech is clear. Skin:No rash, no erythema  Psych: Calm and cooperative. DIAGNOSTIC RESULTS   LABS:    Labs Reviewed   CBC WITH AUTO DIFFERENTIAL - Abnormal; Notable for the following components:       Result Value    WBC 15.4 (*)     RBC 3.41 (*)     Hemoglobin 10.4 (*)     Hematocrit 32.0 (*)     RDW 16.9 (*)     Neutrophils Absolute 13.6 (*)     Lymphocytes Absolute 0.8 (*)     All other components within normal limits   COMPREHENSIVE METABOLIC PANEL W/ REFLEX TO MG FOR LOW K - Abnormal; Notable for the following components:    Sodium 133 (*)     Chloride 92 (*)     Glucose 172 (*)     BUN 50 (*)     Creatinine 6.4 (*)     Est, Glom Filt Rate 10 (*)     ALT 8 (*)     AST 11 (*)     All other components within normal limits    Narrative:     Nany Gilliam tel. [phone],  Chemistry results called to and read back by Tanja Marrero RN, 01/29/2023  00:05, by Earline Fleischer   TROPONIN - Abnormal; Notable for the following components:    Troponin 0.11 (*)     All other components within normal limits    Narrative:     [address]. [phone],  Chemistry results called to and read back by Tanja Marrero RN, 01/29/2023  00:05, by [address] - Abnormal; Notable for the following components:    Pro-BNP >70,000 (*)     All other components within normal limits   BLOOD GAS, VENOUS - Abnormal; Notable for the following components:    pH, Blade 7.301 (*)     pCO2, Blade 51.2 (*)     All other components within normal limits   CULTURE, BLOOD 1   CULTURE, BLOOD 2   LACTATE, SEPSIS   LACTATE, SEPSIS       When ordered only abnormal lab results are displayed. All other labs were within normal range or not returned as of this dictation.     EKG  The EKG, as interpreted by myself, in the emergency department in the absence of a cardiologist.  sinus tachycardia, wtxy=128  Axis is   Normal  QTc is  within an acceptable range  Intervals and Durations are unremarkable. No specific ST-T wave changes appreciated. No evidence of acute ischemia. No significant change from prior EKG dated 12/25/2022      RADIOLOGY:   Non-plain film images such as CT, Ultrasound and MRI are read by the radiologist. Plain radiographic images are visualized and preliminarily interpreted by the ED Provider with the below findings:    CXR: stable cardiomegaly with mild pulmonary congestion, hazy interstitial and ground-glass infiltrates or pulmonary edema in the R lung    Interpretation per the Radiologist below, if available at the time of this note:    XR CHEST PORTABLE   Final Result   Stable cardiomegaly with mild central pulmonary congestion which is less   prominent. Hazy interstitial and ground-glass infiltrates or pulmonary edema scattered   in the right lung which is more prominent. Mild left basilar atelectasis or consolidation and small left pleural   effusion which is less prominent. No change in left subclavian catheter. No results found. Bedside Ultrasound, as interpreted by me, if performed:    No results found. PROCEDURES     Unless otherwise noted below, none     Procedures    CRITICAL CARE TIME     I personally spent a total of 60 minutes of critical care time in obtaining history, performing a physical exam, bedside monitoring of interventions, collecting and interpreting tests and discussion with consultants but excluding time spent performing procedures, treating other patients and teaching time.                                                                                                            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), CAD (coronary artery disease), Cardiomyopathy ([facility] (04/19/2019), CHF (congestive heart failure) ([facility] Chronic pain, COPD (chronic obstructive pulmonary disease) ([facility] Depression, Diabetes mellitus ([address] Difficult intravenous access, Emphysema of lung ([facility] ESRD (end stage renal disease) on dialysis ([facility] Fear of needles, Gastric ulcer, GERD (gastroesophageal reflux disease), Heart valve problem, Hemodialysis patient ([address] History of spinal fracture, blood clots, Hyperlipidemia, Hypertension, MI (myocardial infarction) ([facility] (2019), Neuromuscular disorder ([facility] Numbness and tingling in left arm, Pneumonia, PONV (postoperative nausea and vomiting), Prolonged emergence from general anesthesia, Sleep apnea, Stroke ([facility] TIA (transient ischemic attack), and Unspecified diseases of blood and blood-forming organs. EMERGENCY DEPARTMENT COURSE and DIFFERENTIAL DIAGNOSIS/MDM:     Vitals:    Vitals:    01/29/23 0230 01/29/23 0236 01/29/23 0315 01/29/23 0317   BP: (!) 158/88   (!) 158/85   Pulse: 100 (!) 103 99 99   Resp: 14 10 14 14   Temp: 97.6 °F (36.4 °C)   98.1 °F (36.7 °C)   TempSrc: Axillary   Oral   SpO2: 100% 100% 100% 100%   Weight:       Height:           Patient was treated with and given the following medications:  Medications - No data to display          Is this patient to be included in the SEP-1 Core Measure due to severe sepsis or septic shock?    No Exclusion criteria - the patient is NOT to be included for SEP-1 Core Measure due to: May have criteria for sepsis, but does not meet criteria for severe sepsis or septic shock    CC/HPI Summary, DDx, ED Course, and Reassessment:     - ddx: PE, PNA, HF, acute on chronic COPD, aortic dissection, volume overload 2/2 missing dialysis  - pt was put on bipap and maintained high SpO2  - pt met the sepsis criteria therefore was evaluated for sepsis w/u  - lactate was normal, bld cx x2 pending  - pt unable to provide urine (ESRD on dialysis)  - CXR notable for pulmonary congestion but no obvious sign of PNA  - labs notable for elevated WBC 15.4, anemia Hgb 10.4 (likely 2/2 ESRD), trop elevated to 0.11 (likely 2/2 fluid-overload), BNP was very high at over 70,000 suggestive of volume overload vs acute on chronic HF  - over time patient was able to be taken off bipap and wean down gradually back to his normal baseline O2 requirement of 3L NC  - pt reports feeling better and overall appears to be well without sign of respiratory distress  - pt was discharged to home with close PCP f/u    CONSULTS: (Who and What was discussed)  None    Discussion with Other Professionals : None    Social Determinants : None    Patient's care impacted by chronic condition(s): HF, COPD, ESRD on dialysis (MWF)    Records Reviewed : Inpatient Notes   and Outpatient Notes      I considered dialysis but did not after shared decision making with patient due to improvement on conservative management    Appropriate for outpatient management PCP f/u    I am the Primary Clinician of Record. FINAL IMPRESSION      1. Hypertensive urgency          DISPOSITION/PLAN     DISPOSITION Decision To Discharge 01/29/2023 03:18:51 AM      PATIENT REFERRED TO:  Elsy Morales DO  [facility]. [address] [facility] [address]  [phone]    Schedule an appointment as soon as possible for a visit       DISCHARGE MEDICATIONS:  Patient was given scripts for the following medications. I counseled patient how to take these medications:  Discharge Medication List as of 1/29/2023  3:21 AM          DISCONTINUED MEDICATIONS:  Discharge Medication List as of 1/29/2023  3:21 AM                 (This chart was generated in part by using Dragon Dictation system and may contain errors related to that system including errors in grammar, punctuation, and spelling, as well as words and phrases that may be inappropriate.  If there are any questions or concerns please feel free to contact the dictating provider for clarification.)    Elisa Sanchez MD, PhD  Family Medicine, PGY-1       Elisa Sanchez MD PhD  Resident  01/29/23 6533

## 2023-01-30 PROBLEM — I16.0 HYPERTENSIVE URGENCY: Status: ACTIVE | Noted: 2023-01-30

## 2023-01-30 LAB
ANION GAP SERPL CALCULATED.3IONS-SCNC: 16 MMOL/L (ref 3–16)
BASOPHILS ABSOLUTE: 0 K/UL (ref 0–0.2)
BASOPHILS RELATIVE PERCENT: 0.2 %
BUN BLDV-MCNC: 70 MG/DL (ref 7–20)
CALCIUM SERPL-MCNC: 8.5 MG/DL (ref 8.3–10.6)
CHLORIDE BLD-SCNC: 94 MMOL/L (ref 99–110)
CO2: 22 MMOL/L (ref 21–32)
CREAT SERPL-MCNC: 8.1 MG/DL (ref 0.9–1.3)
EOSINOPHILS ABSOLUTE: 0 K/UL (ref 0–0.6)
EOSINOPHILS RELATIVE PERCENT: 0 %
GFR SERPL CREATININE-BSD FRML MDRD: 7 ML/MIN/{1.73_M2}
GLUCOSE BLD-MCNC: 258 MG/DL (ref 70–99)
HCT VFR BLD CALC: 27.3 % (ref 40.5–52.5)
HEMOGLOBIN: 8.9 G/DL (ref 13.5–17.5)
IRON SATURATION: 11 % (ref 20–50)
IRON: 22 UG/DL (ref 59–158)
LYMPHOCYTES ABSOLUTE: 0.2 K/UL (ref 1–5.1)
LYMPHOCYTES RELATIVE PERCENT: 2.4 %
MCH RBC QN AUTO: 30.3 PG (ref 26–34)
MCHC RBC AUTO-ENTMCNC: 32.8 G/DL (ref 31–36)
MCV RBC AUTO: 92.4 FL (ref 80–100)
MONOCYTES ABSOLUTE: 0.2 K/UL (ref 0–1.3)
MONOCYTES RELATIVE PERCENT: 1.6 %
NEUTROPHILS ABSOLUTE: 9.4 K/UL (ref 1.7–7.7)
NEUTROPHILS RELATIVE PERCENT: 95.8 %
PDW BLD-RTO: 16.4 % (ref 12.4–15.4)
PLATELET # BLD: 313 K/UL (ref 135–450)
PMV BLD AUTO: 7.5 FL (ref 5–10.5)
POTASSIUM REFLEX MAGNESIUM: 5.3 MMOL/L (ref 3.5–5.1)
RBC # BLD: 2.95 M/UL (ref 4.2–5.9)
SODIUM BLD-SCNC: 132 MMOL/L (ref 136–145)
TOTAL IRON BINDING CAPACITY: 201 UG/DL (ref 260–445)
TROPONIN: 0.16 NG/ML
TROPONIN: 0.16 NG/ML
WBC # BLD: 9.8 K/UL (ref 4–11)

## 2023-01-30 PROCEDURE — 94660 CPAP INITIATION&MGMT: CPT

## 2023-01-30 PROCEDURE — 2700000000 HC OXYGEN THERAPY PER DAY

## 2023-01-30 PROCEDURE — 6370000000 HC RX 637 (ALT 250 FOR IP): Performed by: INTERNAL MEDICINE

## 2023-01-30 PROCEDURE — 6360000002 HC RX W HCPCS: Performed by: INTERNAL MEDICINE

## 2023-01-30 PROCEDURE — 83540 ASSAY OF IRON: CPT

## 2023-01-30 PROCEDURE — 85025 COMPLETE CBC W/AUTO DIFF WBC: CPT

## 2023-01-30 PROCEDURE — 2580000003 HC RX 258: Performed by: INTERNAL MEDICINE

## 2023-01-30 PROCEDURE — 83550 IRON BINDING TEST: CPT

## 2023-01-30 PROCEDURE — 80048 BASIC METABOLIC PNL TOTAL CA: CPT

## 2023-01-30 PROCEDURE — 2000000000 HC ICU R&B

## 2023-01-30 PROCEDURE — 36415 COLL VENOUS BLD VENIPUNCTURE: CPT

## 2023-01-30 PROCEDURE — 90935 HEMODIALYSIS ONE EVALUATION: CPT

## 2023-01-30 PROCEDURE — 94761 N-INVAS EAR/PLS OXIMETRY MLT: CPT

## 2023-01-30 PROCEDURE — 2500000003 HC RX 250 WO HCPCS: Performed by: INTERNAL MEDICINE

## 2023-01-30 PROCEDURE — 5A1D70Z PERFORMANCE OF URINARY FILTRATION, INTERMITTENT, LESS THAN 6 HOURS PER DAY: ICD-10-PCS | Performed by: INTERNAL MEDICINE

## 2023-01-30 PROCEDURE — 84484 ASSAY OF TROPONIN QUANT: CPT

## 2023-01-30 RX ORDER — DOCUSATE SODIUM 100 MG/1
100 CAPSULE, LIQUID FILLED ORAL PRN
Status: DISCONTINUED | OUTPATIENT
Start: 2023-01-30 | End: 2023-01-30

## 2023-01-30 RX ORDER — SODIUM CHLORIDE 9 MG/ML
INJECTION, SOLUTION INTRAVENOUS PRN
Status: DISCONTINUED | OUTPATIENT
Start: 2023-01-30 | End: 2023-02-06 | Stop reason: HOSPADM

## 2023-01-30 RX ORDER — ISOSORBIDE DINITRATE 20 MG/1
20 TABLET ORAL 3 TIMES DAILY
Status: DISCONTINUED | OUTPATIENT
Start: 2023-01-30 | End: 2023-02-06 | Stop reason: HOSPADM

## 2023-01-30 RX ORDER — IPRATROPIUM BROMIDE AND ALBUTEROL SULFATE 2.5; .5 MG/3ML; MG/3ML
1 SOLUTION RESPIRATORY (INHALATION)
Status: DISCONTINUED | OUTPATIENT
Start: 2023-01-30 | End: 2023-01-30

## 2023-01-30 RX ORDER — IPRATROPIUM BROMIDE AND ALBUTEROL SULFATE 2.5; .5 MG/3ML; MG/3ML
1 SOLUTION RESPIRATORY (INHALATION) EVERY 4 HOURS PRN
Status: DISCONTINUED | OUTPATIENT
Start: 2023-01-30 | End: 2023-02-06 | Stop reason: HOSPADM

## 2023-01-30 RX ORDER — HEPARIN SODIUM 1000 [USP'U]/ML
2000 INJECTION, SOLUTION INTRAVENOUS; SUBCUTANEOUS ONCE
Status: COMPLETED | OUTPATIENT
Start: 2023-01-30 | End: 2023-01-30

## 2023-01-30 RX ORDER — IPRATROPIUM BROMIDE AND ALBUTEROL SULFATE 2.5; .5 MG/3ML; MG/3ML
1 SOLUTION RESPIRATORY (INHALATION) 4 TIMES DAILY
Status: DISCONTINUED | OUTPATIENT
Start: 2023-01-30 | End: 2023-01-30

## 2023-01-30 RX ORDER — CALCIUM ACETATE 667 MG/1
1 CAPSULE ORAL
Status: DISCONTINUED | OUTPATIENT
Start: 2023-01-30 | End: 2023-02-06 | Stop reason: HOSPADM

## 2023-01-30 RX ORDER — ACETAMINOPHEN 325 MG/1
650 TABLET ORAL EVERY 6 HOURS PRN
Status: DISCONTINUED | OUTPATIENT
Start: 2023-01-30 | End: 2023-02-06 | Stop reason: HOSPADM

## 2023-01-30 RX ORDER — PRAVASTATIN SODIUM 40 MG
40 TABLET ORAL DAILY
Status: DISCONTINUED | OUTPATIENT
Start: 2023-01-30 | End: 2023-02-06 | Stop reason: HOSPADM

## 2023-01-30 RX ORDER — GABAPENTIN 100 MG/1
200 CAPSULE ORAL 3 TIMES DAILY PRN
Status: DISCONTINUED | OUTPATIENT
Start: 2023-01-30 | End: 2023-02-06 | Stop reason: HOSPADM

## 2023-01-30 RX ORDER — SENNA AND DOCUSATE SODIUM 50; 8.6 MG/1; MG/1
1 TABLET, FILM COATED ORAL 2 TIMES DAILY PRN
Status: DISCONTINUED | OUTPATIENT
Start: 2023-01-30 | End: 2023-02-06 | Stop reason: HOSPADM

## 2023-01-30 RX ORDER — METOPROLOL SUCCINATE 50 MG/1
100 TABLET, EXTENDED RELEASE ORAL 2 TIMES DAILY
Status: DISCONTINUED | OUTPATIENT
Start: 2023-01-30 | End: 2023-02-06 | Stop reason: HOSPADM

## 2023-01-30 RX ORDER — ACETAMINOPHEN 650 MG/1
650 SUPPOSITORY RECTAL EVERY 6 HOURS PRN
Status: DISCONTINUED | OUTPATIENT
Start: 2023-01-30 | End: 2023-02-06 | Stop reason: HOSPADM

## 2023-01-30 RX ORDER — CALCIUM CARBONATE 200(500)MG
1 TABLET,CHEWABLE ORAL 3 TIMES DAILY PRN
Status: DISCONTINUED | OUTPATIENT
Start: 2023-01-30 | End: 2023-02-06 | Stop reason: HOSPADM

## 2023-01-30 RX ORDER — SODIUM CHLORIDE 0.9 % (FLUSH) 0.9 %
5-40 SYRINGE (ML) INJECTION EVERY 12 HOURS SCHEDULED
Status: DISCONTINUED | OUTPATIENT
Start: 2023-01-30 | End: 2023-02-06 | Stop reason: HOSPADM

## 2023-01-30 RX ORDER — ONDANSETRON 4 MG/1
4 TABLET, ORALLY DISINTEGRATING ORAL EVERY 8 HOURS PRN
Status: DISCONTINUED | OUTPATIENT
Start: 2023-01-30 | End: 2023-02-06 | Stop reason: HOSPADM

## 2023-01-30 RX ORDER — CALCITRIOL 0.25 UG/1
1.25 CAPSULE, LIQUID FILLED ORAL
Status: DISCONTINUED | OUTPATIENT
Start: 2023-01-30 | End: 2023-02-06 | Stop reason: HOSPADM

## 2023-01-30 RX ORDER — ONDANSETRON 2 MG/ML
4 INJECTION INTRAMUSCULAR; INTRAVENOUS EVERY 6 HOURS PRN
Status: DISCONTINUED | OUTPATIENT
Start: 2023-01-30 | End: 2023-02-06 | Stop reason: HOSPADM

## 2023-01-30 RX ORDER — IPRATROPIUM BROMIDE AND ALBUTEROL SULFATE 2.5; .5 MG/3ML; MG/3ML
1 SOLUTION RESPIRATORY (INHALATION) EVERY 4 HOURS PRN
Status: DISCONTINUED | OUTPATIENT
Start: 2023-01-30 | End: 2023-01-30

## 2023-01-30 RX ORDER — PANTOPRAZOLE SODIUM 40 MG/1
40 TABLET, DELAYED RELEASE ORAL
Status: DISCONTINUED | OUTPATIENT
Start: 2023-01-30 | End: 2023-02-06 | Stop reason: HOSPADM

## 2023-01-30 RX ORDER — POLYETHYLENE GLYCOL 3350 17 G/17G
17 POWDER, FOR SOLUTION ORAL DAILY PRN
Status: DISCONTINUED | OUTPATIENT
Start: 2023-01-30 | End: 2023-02-06 | Stop reason: HOSPADM

## 2023-01-30 RX ORDER — DULOXETIN HYDROCHLORIDE 30 MG/1
30 CAPSULE, DELAYED RELEASE ORAL DAILY
Status: DISCONTINUED | OUTPATIENT
Start: 2023-01-30 | End: 2023-02-06 | Stop reason: HOSPADM

## 2023-01-30 RX ORDER — CLOPIDOGREL BISULFATE 75 MG/1
75 TABLET ORAL DAILY
Status: DISCONTINUED | OUTPATIENT
Start: 2023-01-30 | End: 2023-02-06 | Stop reason: HOSPADM

## 2023-01-30 RX ORDER — HEPARIN SODIUM 1000 [USP'U]/ML
1000 INJECTION, SOLUTION INTRAVENOUS; SUBCUTANEOUS ONCE
Status: COMPLETED | OUTPATIENT
Start: 2023-01-30 | End: 2023-01-30

## 2023-01-30 RX ORDER — SODIUM CHLORIDE 0.9 % (FLUSH) 0.9 %
5-40 SYRINGE (ML) INJECTION PRN
Status: DISCONTINUED | OUTPATIENT
Start: 2023-01-30 | End: 2023-02-06 | Stop reason: HOSPADM

## 2023-01-30 RX ORDER — LOPERAMIDE HYDROCHLORIDE 2 MG/1
2 CAPSULE ORAL 4 TIMES DAILY PRN
Status: DISCONTINUED | OUTPATIENT
Start: 2023-01-30 | End: 2023-02-06 | Stop reason: HOSPADM

## 2023-01-30 RX ORDER — QUETIAPINE FUMARATE 25 MG/1
50 TABLET, FILM COATED ORAL NIGHTLY
Status: DISCONTINUED | OUTPATIENT
Start: 2023-01-30 | End: 2023-02-06 | Stop reason: HOSPADM

## 2023-01-30 RX ORDER — DOCUSATE SODIUM 100 MG/1
100 CAPSULE, LIQUID FILLED ORAL 2 TIMES DAILY PRN
Status: DISCONTINUED | OUTPATIENT
Start: 2023-01-30 | End: 2023-02-06 | Stop reason: HOSPADM

## 2023-01-30 RX ORDER — SENNA AND DOCUSATE SODIUM 50; 8.6 MG/1; MG/1
1 TABLET, FILM COATED ORAL PRN
Status: DISCONTINUED | OUTPATIENT
Start: 2023-01-30 | End: 2023-01-30

## 2023-01-30 RX ADMIN — APIXABAN 2.5 MG: 2.5 TABLET, FILM COATED ORAL at 20:55

## 2023-01-30 RX ADMIN — CALCIUM ACETATE 667 MG: 667 CAPSULE ORAL at 17:04

## 2023-01-30 RX ADMIN — HEPARIN SODIUM 2000 UNITS: 1000 INJECTION INTRAVENOUS; SUBCUTANEOUS at 15:21

## 2023-01-30 RX ADMIN — LOPERAMIDE HYDROCHLORIDE 2 MG: 2 CAPSULE ORAL at 17:04

## 2023-01-30 RX ADMIN — APIXABAN 2.5 MG: 2.5 TABLET, FILM COATED ORAL at 08:32

## 2023-01-30 RX ADMIN — CALCIUM ACETATE 667 MG: 667 CAPSULE ORAL at 08:32

## 2023-01-30 RX ADMIN — APIXABAN 2.5 MG: 2.5 TABLET, FILM COATED ORAL at 00:41

## 2023-01-30 RX ADMIN — ISOSORBIDE DINITRATE 20 MG: 20 TABLET ORAL at 08:32

## 2023-01-30 RX ADMIN — MUPIROCIN: 20 OINTMENT TOPICAL at 08:32

## 2023-01-30 RX ADMIN — METOPROLOL SUCCINATE 100 MG: 50 TABLET, EXTENDED RELEASE ORAL at 20:55

## 2023-01-30 RX ADMIN — GABAPENTIN 200 MG: 100 CAPSULE ORAL at 14:54

## 2023-01-30 RX ADMIN — METOPROLOL SUCCINATE 100 MG: 50 TABLET, EXTENDED RELEASE ORAL at 00:41

## 2023-01-30 RX ADMIN — ISOSORBIDE DINITRATE 20 MG: 20 TABLET ORAL at 22:06

## 2023-01-30 RX ADMIN — SODIUM CHLORIDE, PRESERVATIVE FREE 10 ML: 5 INJECTION INTRAVENOUS at 22:07

## 2023-01-30 RX ADMIN — CALCITRIOL 1.25 MCG: 0.25 CAPSULE ORAL at 14:46

## 2023-01-30 RX ADMIN — PANTOPRAZOLE SODIUM 40 MG: 40 TABLET, DELAYED RELEASE ORAL at 06:34

## 2023-01-30 RX ADMIN — METOPROLOL SUCCINATE 100 MG: 50 TABLET, EXTENDED RELEASE ORAL at 08:32

## 2023-01-30 RX ADMIN — DULOXETINE HYDROCHLORIDE 30 MG: 30 CAPSULE, DELAYED RELEASE ORAL at 08:32

## 2023-01-30 RX ADMIN — CALCIUM ACETATE 667 MG: 667 CAPSULE ORAL at 14:51

## 2023-01-30 RX ADMIN — HEPARIN SODIUM 1000 UNITS: 1000 INJECTION INTRAVENOUS; SUBCUTANEOUS at 14:58

## 2023-01-30 RX ADMIN — CLOPIDOGREL BISULFATE 75 MG: 75 TABLET ORAL at 08:32

## 2023-01-30 RX ADMIN — PRAVASTATIN SODIUM 40 MG: 40 TABLET ORAL at 08:32

## 2023-01-30 RX ADMIN — ISOSORBIDE DINITRATE 20 MG: 20 TABLET ORAL at 14:45

## 2023-01-30 RX ADMIN — MUPIROCIN: 20 OINTMENT TOPICAL at 22:09

## 2023-01-30 RX ADMIN — QUETIAPINE FUMARATE 50 MG: 25 TABLET ORAL at 00:41

## 2023-01-30 RX ADMIN — EPOETIN ALFA-EPBX 10000 UNITS: 10000 INJECTION, SOLUTION INTRAVENOUS; SUBCUTANEOUS at 14:57

## 2023-01-30 RX ADMIN — QUETIAPINE FUMARATE 50 MG: 25 TABLET ORAL at 20:55

## 2023-01-30 ASSESSMENT — PAIN SCALES - GENERAL
PAINLEVEL_OUTOF10: 0
PAINLEVEL_OUTOF10: 0
PAINLEVEL_OUTOF10: 2
PAINLEVEL_OUTOF10: 8

## 2023-01-30 ASSESSMENT — PAIN DESCRIPTION - ORIENTATION: ORIENTATION: POSTERIOR

## 2023-01-30 ASSESSMENT — PAIN DESCRIPTION - DESCRIPTORS: DESCRIPTORS: OTHER (COMMENT)

## 2023-01-30 ASSESSMENT — PAIN - FUNCTIONAL ASSESSMENT: PAIN_FUNCTIONAL_ASSESSMENT: ACTIVITIES ARE NOT PREVENTED

## 2023-01-30 ASSESSMENT — PAIN DESCRIPTION - PAIN TYPE: TYPE: CHRONIC PAIN

## 2023-01-30 ASSESSMENT — PAIN DESCRIPTION - LOCATION: LOCATION: BACK

## 2023-01-30 NOTE — PROGRESS NOTES
01/30/23 0014   NIV Type   $NIV $Daily Charge   Equipment Type v60   Mode Bilevel   Mask Type Full face mask   Mask Size Medium   Settings/Measurements   PIP Observed 14 cm H20   IPAP 14 cmH20   CPAP/EPAP 6 cmH2O   Vt (Measured) 776 mL   Rate Ordered 8   Resp 24   FiO2  40 %   I Time/ I Time % 1 s   Minute Volume (L/min) 17 Liters   Mask Leak (lpm) 52 lpm   Comfort Level Good   Using Accessory Muscles Yes   SpO2 100   Patient's Home Machine No   Alarm Settings   Alarms On Y   Low Pressure (cmH2O) 6 cmH2O   High Pressure (cmH2O) 30 cmH2O   Apnea (secs) 20 secs   RR Low (bpm) 6   RR High (bpm) 45 br/min

## 2023-01-30 NOTE — PROGRESS NOTES
01/30/23 0800   Oxygen Therapy/Pulse Ox   O2 Therapy Oxygen   $Oxygen $Daily Charge   O2 Device Nasal cannula   O2 Flow Rate (L/min) 2 L/min   Heart Rate 81   Resp 18   SpO2 95 %   $Pulse Oximeter $Spot check (multiple/continuous)     Patient sleeping in no distress

## 2023-01-30 NOTE — PROGRESS NOTES
Patient weaned off of nitro drip and on 3L oxygen via nasal cannula. Messaged Dr. Demi Church, who has requested pt be transferred to telemetry unit.      Pablo Bloch, RN

## 2023-01-30 NOTE — PROGRESS NOTES
01/29/23 2202   NIV Type   Equipment Type v60   Mode Bilevel   Mask Type Full face mask   Mask Size Medium   Settings/Measurements   PIP Observed 15 cm H20   IPAP 14 cmH20   CPAP/EPAP 6 cmH2O   Vt (Measured) 709 mL   Rate Ordered 8   Resp 19   FiO2  50 %   Minute Volume (L/min) 14.5 Liters   Mask Leak (lpm) 34 lpm   Comfort Level Good   Using Accessory Muscles No   SpO2 99   Patient's Home Machine No   Alarm Settings   Alarms On Y   Low Pressure (cmH2O) 6 cmH2O   High Pressure (cmH2O) 30 cmH2O   Apnea (secs) 20 secs   RR Low (bpm) 6   RR High (bpm) 40 br/min

## 2023-01-30 NOTE — PLAN OF CARE
Problem: Pain  Goal: Verbalizes/displays adequate comfort level or baseline comfort level  Outcome: Progressing  Flowsheets (Taken 1/30/2023 1729)  Verbalizes/displays adequate comfort level or baseline comfort level:   Encourage patient to monitor pain and request assistance   Assess pain using appropriate pain scale   Administer analgesics based on type and severity of pain and evaluate response   Implement non-pharmacological measures as appropriate and evaluate response   Notify Licensed Independent Practitioner if interventions unsuccessful or patient reports new pain     Problem: Chronic Conditions and Co-morbidities  Goal: Patient's chronic conditions and co-morbidity symptoms are monitored and maintained or improved  Outcome: Progressing  Flowsheets (Taken 1/30/2023 1729)  Care Plan - Patient's Chronic Conditions and Co-Morbidity Symptoms are Monitored and Maintained or Improved:   Monitor and assess patient's chronic conditions and comorbid symptoms for stability, deterioration, or improvement   Collaborate with multidisciplinary team to address chronic and comorbid conditions and prevent exacerbation or deterioration   Update acute care plan with appropriate goals if chronic or comorbid symptoms are exacerbated and prevent overall improvement and discharge

## 2023-01-30 NOTE — H&P
Hospital Medicine History & Physical      PCP: Pcp No    Date of Admission: 1/29/2023    Date of Service: Pt seen/examined on 1/30/2023 and Admitted to Inpatient with expected LOS greater than two midnights due to medical therapy. Chief Complaint: Shortness of breath      History Of Present Illness:    45 y.o. male who presented to [facility] [place] with above complaints  Patient with PMH of ESRD on hemodialysis MWF, chronic systolic CHF, s/p TAVR, CAD, HTN, DM2, asthma-COPD overlap presented to the ED with complaints of shortness of breath. Patient was recently evaluated in the ED on 1/28 for similar complaints. At that time he was given BiPAP therapy in the ED for a couple of hours and then weaned off of the BiPAP to 3 L oxygen via nasal cannula. Patient felt well and he was discharged home in stable condition. He reports that shortness of breath got worse earlier today and ended up calling EMS who placed the patient on CPAP prior to arrival to the ED. patient denied any chest pain. He endorsed that he did go to dialysis, last dialysis session was last Friday. Denies any cough. No fevers or chills. EMS stated that the patient was wheezing when they picked him up and gave him 1 round of DuoNeb in route.     Past Medical History:          Diagnosis Date    Ambulatory dysfunction     walker for long distances, SOB with distance    Aortic stenosis     echo 2017    Arthritis     hands and hips    Asthma     Bilateral hilar adenopathy syndrome 6/3/2013    CAD (coronary artery disease)     Dr. Manny Ball [facility]) 04/19/2019    EF= 43%    CHF (congestive heart failure) ([address]     Chronic pain     COPD (chronic obstructive pulmonary disease) [facility])     pulmonology Dr. Krista Soni    Depression     Diabetes mellitus ([address]     borderline    Difficult intravenous access     Emphysema of lung ([address]     ESRD (end stage renal disease) on dialysis ([address]     MWF    Fear of needles     Gastric ulcer     GERD (gastroesophageal reflux disease)     Heart valve problem     bicuspic valve    Hemodialysis patient ([facility]     History of spinal fracture     work incident    Hx of blood clots     Bilateral lower extremities; stents in place    Hyperlipidemia     Hypertension     MI (myocardial infarction) ([facility] 2019    has had 9 MIs. 2019 was the last    Neuromuscular disorder ([facility]     due to CVA    Numbness and tingling in left arm     from fistula    Pneumonia     PONV (postoperative nausea and vomiting)     Prolonged emergence from general anesthesia     States requires more medication than most people    Sleep apnea     Uses CPAP    Stroke ([facility]     7mm thalamic cva 2017 deficts left side, left side weakness    TIA (transient ischemic attack)     Unspecified diseases of blood and blood-forming organs        Past Surgical History:          Procedure Laterality Date    AORTIC VALVE REPLACEMENT N/A 10/15/2019    TRANSCATHETER AORTIC VALVE REPLACEMENT FEMORAL APPROACH performed by Mckay Mcgrath MD at [facility] Right 7/2/2019    PERITONEAL DIALYSIS CATHETER REMOVAL performed by Lee Meadows MD at [address] [place] [address]  2/29/2015    [facility]    CORONARY ANGIOPLASTY WITH STENT PLACEMENT  05/26/15    CYST REMOVAL  08/14/2013    EXCISION CYSTS, NECK X2 AND ABDOMINAL benign    DIAGNOSTIC CARDIAC CATH LAB PROCEDURE      DIALYSIS FISTULA CREATION Left 10/30/2017    LEFT BRACHIAL CEPHALIC FISTULA    DIALYSIS FISTULA CREATION Left 3/27/2019    LIGATION  AV FISTULA performed by Rubin Lora MD at [address] [place] [address], [facility]      IR TUNNELED CATHETER PLACEMENT GREATER THAN 5 YEARS  3/21/2022    IR TUNNELED CATHETER PLACEMENT GREATER THAN 5 YEARS 3/21/2022 [facility]    IR TUNNELED CATHETER PLACEMENT GREATER THAN 5 YEARS  4/21/2022    IR TUNNELED CATHETER PLACEMENT GREATER THAN 5 YEARS 4/21/2022 [facility]    IR TUNNELED CATHETER PLACEMENT GREATER THAN 5 YEARS  4/26/2022    IR TUNNELED CATHETER PLACEMENT GREATER THAN 5 YEARS 4/26/2022 [facility]    IR TUNNELED CATHETER PLACEMENT GREATER THAN 5 YEARS  6/23/2022    IR TUNNELED CATHETER PLACEMENT GREATER THAN 5 YEARS 6/23/2022 [facility]    OTHER SURGICAL HISTORY  02/01/2017    laparoscopic cholecystectomy with intraoperative cholangiogram    OTHER SURGICAL HISTORY  2018    PORT PLACEMENT  - vas cath    OTHER SURGICAL HISTORY Bilateral 06/26/2018    laprascopic peritoneal dialysis catheter placement    OTHER SURGICAL HISTORY Right 09/2018    peritoneal dialysis port placed on right side of abdomen    OTHER SURGICAL HISTORY  05/28/2019    PTA/Stenting R External Iliac artery    TN LAP INSERTION TUNNELED INTRAPERITONEAL CATHETER N/A 9/21/2018    LAPAROSCOPIC PERITONEAL DIALYSIS CATHETER REPLACEMENT performed by Gopal Mcgarry MD at [address] [facility] 2/24/2022    PERINEAL ABCESS DRAINAGE performed by Gopal Mcgarry MD at [address] [place] [address] N/A 10/2/2022    THORACENTESIS ULTRASOUND performed by Hortencia Song MD at [address] [place] [address]  01/06/2016    UPPER GASTROINTESTINAL ENDOSCOPY  01/29/2017    possible candida, otherwise normal appearing    VASCULAR SURGERY  aprx 2 years ago    2 stents placed, each side of groin       Medications Prior to Admission:      Prior to Admission medications    Medication Sig Start Date End Date Taking?  Authorizing Provider   docusate sodium (DOK) 100 MG capsule Take 1 capsule by mouth as needed for Constipation 12/13/22   Edan [facility], DO   sennosides-docusate sodium (SENOKOT-S) 8.6-50 MG tablet Take 1 tablet by mouth as needed for Constipation 12/13/22   [facility], DO   apixaban (ELIQUIS) 2.5 MG TABS tablet Take 1 tablet by mouth 2 times daily 11/1/22   Aylin Cos, APRN - CNP   gabapentin (NEURONTIN) 100 MG capsule Take 2 capsules by mouth 3 times daily as needed (neuropathic pain) for up to 10 days. 10/20/22 10/30/22  Rohan Ellsworth MD   metoprolol succinate (TOPROL XL) 100 MG extended release tablet Take 1 tablet by mouth in the morning and at bedtime 7/21/22   Bakari Colby MD   cyclobenzaprine (FLEXERIL) 5 MG tablet Muscle spasms 7/21/22   Bakari Colby MD   QUEtiapine (SEROQUEL) 50 MG tablet TAKE 1 TABLET BY MOUTH EVERY EVENING 6/30/22   Umu Molina MD   isosorbide dinitrate (ISORDIL) 20 MG tablet Take 1 tablet by mouth 3 times daily 6/8/22   JASE Ochoa   clopidogrel (PLAVIX) 75 MG tablet Take 1 tablet by mouth daily 5/9/22   JAY Hargrove CNP   pantoprazole (PROTONIX) 40 MG tablet TAKE 1 TABLET BY MOUTH EVERY MORNING BEFORE BREAKFAST 4/28/22   Umu Molina MD   DULoxetine (CYMBALTA) 30 MG extended release capsule TAKE 1 CAPSULE BY MOUTH EVERY DAY 3/29/22   Umu Molina MD   pravastatin (PRAVACHOL) 40 MG tablet Take 1 tablet by mouth daily 2/10/22   JAY Hargrove CNP   B Complex-C-Folic Acid (VIRT-CAPS) 1 MG CAPS TAKE 1 CAPSULE BY MOUTH EVERY DAY 9/20/21   Umu Molina MD   Calcium Acetate, Phos Binder, 667 MG CAPS TAKE 1 CAPSULE BY MOUTH THREE TIMES DAILY WITH MEALS 8/12/21   Umu Molina MD   nitroGLYCERIN (NITROSTAT) 0.4 MG SL tablet DISSOLVE 1 TABLET UNDER THE TONGUE AS NEEDED FOR CHEST PAIN EVERY 5 MINUTES UP TO 3 TIMES. IF NO RELIEF CALL 911. 1/7/21   Umu Molina MD   vitamin D (ERGOCALCIFEROL) 70082 units CAPS capsule TK 1 C PO WEEKLY 6/2/19   Historical Provider, MD   Alcohol Swabs PADS USE AS DIRECTED 4/25/18   Margie Magallon MD   ipratropium-albuterol (DUONEB) 0.5-2.5 (3) MG/3ML SOLN nebulizer solution Inhale 3 mLs into the lungs every 6 hours as needed for Shortness of Breath 10/15/17   Stefan Gongora MD   calcium carbonate (TUMS) 500 MG chewable tablet Take 1 tablet by mouth 3 times daily as needed for Heartburn.     Historical Provider, MD       Allergies: Morphine    Social History:      The patient currently lives at home    TOBACCO:   reports that he quit smoking about 2 years ago. His smoking use included cigarettes. He has a 16.50 pack-year smoking history. He has never used smokeless tobacco.  ETOH:   reports that he does not currently use alcohol. E-cigarette/Vaping       Questions Responses    E-cigarette/Vaping Use Never User    Start Date     Passive Exposure     Quit Date     Counseling Given     Comments               Family History:     Reviewed and negative in regards to presenting illness/complaint. Problem Relation Age of Onset    Diabetes Mother     Heart Disease Father     Kidney Disease Sister         stage 4-kidney failure    Cancer Sister     Heart Disease Sister     Obesity Sister     Cancer Sister     Heart Disease Sister     Obesity Sister     Alcohol Abuse Brother        REVIEW OF SYSTEMS COMPLETED:   Pertinent positives as noted in the HPI. All other systems reviewed and negative. PHYSICAL EXAM PERFORMED:    /71   Pulse (!) 106   Temp 98.6 °F (37 °C) (Axillary)   Resp 24   Ht 5' 9\" (1.753 m)   Wt 227 lb (103 kg)   SpO2 100%   BMI 33.52 kg/m²     General appearance:  No apparent distress, appears stated age and cooperative. HEENT:  Normal cephalic, atraumatic without obvious deformity. Pupils equal, round, and reactive to light. Extra ocular muscles intact. Conjunctivae/corneas clear. Neck: Supple, with full range of motion. No jugular venous distention. Trachea midline. Respiratory: Tachypneic, increased respiratory effort. Clear to auscultation, bilaterally without Rales/Wheezes/Rhonchi. Cardiovascular: Tachycardic, regular rhythm with normal S1/S2 without murmurs, rubs or gallops. Abdomen: Soft, non-tender, non-distended with normal bowel sounds. Musculoskeletal:  No clubbing, cyanosis or edema bilaterally. Full range of motion without deformity.   Skin: Skin color, texture, turgor normal.  No rashes or lesions. Neurologic:  Neurovascularly intact without any focal sensory/motor deficits. Cranial nerves: II-XII intact, grossly non-focal.  Psychiatric: Anxious appearing, alert and oriented, thought content appropriate, normal insight  Capillary Refill: Brisk,3 seconds, normal  Peripheral Pulses: +2 palpable, equal bilaterally       Labs:     Recent Labs     01/28/23 2322 01/29/23 2141   WBC 15.4* 13.5*   HGB 10.4* 9.5*   HCT 32.0* 30.1*    381     Recent Labs     01/28/23 2322 01/29/23 2141   * 129*   K 4.3 4.4   CL 92* 90*   CO2 25 20*   BUN 50* 63*   CREATININE 6.4* 7.8*   CALCIUM 9.4 9.1     Recent Labs     01/28/23 2322 01/29/23 2141   AST 11* 12*   ALT 8* 9*   BILITOT <0.2 <0.2   ALKPHOS 73 70     No results for input(s): INR in the last 72 hours. Recent Labs     01/28/23 2322 01/29/23 2141 01/30/23  0038   TROPONINI 0.11* 0.16* 0.16*       Urinalysis:      Lab Results   Component Value Date/Time    NITRU Negative 12/25/2022 05:00 AM    WBCUA 6-9 12/25/2022 05:00 AM    BACTERIA Rare 12/25/2022 05:00 AM    RBCUA 3-4 12/25/2022 05:00 AM    BLOODU SMALL 12/25/2022 05:00 AM    SPECGRAV 1.020 12/25/2022 05:00 AM    GLUCOSEU 500 12/25/2022 05:00 AM       Radiology:     I reviewed the CT chest personally and reviewed radiologist interpretation. Small left pleural effusion, small right pleural effusion, bibasilar atelectasis    EKG:  I have reviewed the EKG with the following interpretation: Sinus tachycardia, no acute ischemic changes    CT CHEST WO CONTRAST   Final Result   Small left pleural effusion which is much less prominent with moderate   atelectasis and consolidation along the left lung base extending into the   lingula which is less prominent      Small right pleural effusion layering posteriorly with moderate atelectasis   and consolidation posteriorly extending into the upper chest which is more   prominent.       Mild pleural based nodule or scarring vs infiltrate along the right upper   lobe laterally and a 5 mm nodule along the left upper lobe which is   unchanged. Recommend follow-up. Mild mediastinal adenopathy which is more prominent. Suggest PET scan   correlation. Left subclavian catheter which is unchanged and position. 1.6 cm left adrenal adenoma which is unchanged. Fleischner Society guidelines for follow-up and management of incidentally   detected pulmonary nodules      Multiple Solid Nodules:      Nodule size less than 6 mm   In a low-risk patient, no routine follow-up. In a high-risk patient, optional CT at 12 months. Nodule size equals 6-8 mm   In a low-risk patient, CT at 3-6 months, then consider CT at 18-24 months. In a high-risk patient, CT at 3-6 months, then CT at 18-24 months. Nodule size greater than 8 mm   In a low-risk patient, CT at 3-6 months, then consider CT at 18-24 months. In a high-risk patient, CT at 3-6 months, then CT at 18-24 months. - Low risk patients include individuals with minimal or absent history of   smoking and other known risk factors. - High risk patients include individuals with a history or smoking or known   risk factors. Radiology 2017 http://pubs. rsna.org/doi/full/10.1148/radiol. 6884970362             Consults:    IP CONSULT TO HOSPITALIST  IP CONSULT TO NEPHROLOGY    ASSESSMENT:PLAN:    Active Hospital Problems    Diagnosis Date Noted    Acute respiratory failure with hypoxemia ([facility] [J96.01] 12/25/2022     Priority: High    Ischemic cardiomyopathy [I25.5]      Priority: High    DM2 (diabetes mellitus, type 2) ([email or web address] [E11.9] 03/04/2014     Priority: High    Hypertensive urgency [I16.0] 01/30/2023     Priority: Medium    HFrEF (heart failure with reduced ejection fraction) ([email or web address] [I50.20] 09/24/2022     Priority: Medium    PAF (paroxysmal atrial fibrillation) ([email or web address] [I48.0]     ESRD (end stage renal disease) ([facility] [N18.6] 11/22/2017    Dyslipidemia [E78.5]     ZAINAB on CPAP [G47.33, Z99.89] 02/11/2016    Asthma-COPD overlap syndrome ([facility] [J44.9] 05/14/2013     Acute on chronic, respiratory failure with hypoxemia  Etiology is obscure, clinically he could have been in COPD exacerbation with reported wheezing on the field, however when I examined him his lungs are clear  Thersia Mouse with ED physician and discussed treatment plan  -BiPAP initiated by squad, continued and ER and ICU. After a few hours we were able to wean him off to nasal cannula oxygen. Currently on 3 LPM  -CT did not show any significant pulmonary edema or pleural effusions  -Continue duo nebs every 4 hours  -We will transfer to [facility] bed    Hypertensive urgency  -Started on nitro drip in the ED  -We will try to wean off nitro drip and transfer to [facility] bed  -Continue metoprolol, Isordil    ESRD on HD-nephrology consulted. Patient reports compliance with HD treatment and did not miss last Friday    Troponin elevation-chronic elevation due to ESRD status. No ischemic changes on EKG. No anginal symptoms    CAD/chronic systolic CHF EF 20 to 62%  -Stable, continue Plavix, Isordil, metoprolol and statin  -Patient denied any anginal symptoms    Paroxysmal A. fib-continue metoprolol and Eliquis, currently in sinus rhythm    ZAINAB on CPAP-resume CPAP at night    DVT Prophylaxis: Eliquis  Diet: Renal, diabetic diet  Code Status: Full Code    PT/OT Eval Status: Not consulted    Dispo -IP stay, initially admitted to ICU    Total critical care time caring for this patient with life threatening, unstable organ failure, including direct patient contact, management of life support systems, review of data including imaging and labs, discussions with other team members and physicians at least 45 min so far today, excluding procedures. Sol Bernstein MD    Thank you Pcp No for the opportunity to be involved in this patient's care. If you have any questions or concerns please feel free to contact me at [phone].

## 2023-01-30 NOTE — PROGRESS NOTES
01/29/23 7748   NIV Type   Equipment Type v60   Mode Bilevel   Mask Type Full face mask   Mask Size Medium   Settings/Measurements   PIP Observed 15 cm H20   IPAP 14 cmH20   CPAP/EPAP 6 cmH2O   Vt (Measured) 870 mL   Rate Ordered 8   Resp 21   FiO2  45 %  (decreased to 40%)   I Time/ I Time % 1 s   Minute Volume (L/min) 11 Liters   Mask Leak (lpm) 46 lpm   Comfort Level Good   Using Accessory Muscles No   SpO2 100   Patient's Home Machine No   Alarm Settings   Alarms On Y   Low Pressure (cmH2O) 6 cmH2O   High Pressure (cmH2O) 30 cmH2O   Apnea (secs) 20 secs   RR Low (bpm) 6   RR High (bpm) 45 br/min

## 2023-01-30 NOTE — RT PROTOCOL NOTE
RT Inhaler-Nebulizer Bronchodilator Protocol Note    There is a bronchodilator order in the chart from a provider indicating to follow the RT Bronchodilator Protocol and there is an Initiate RT Inhaler-Nebulizer Bronchodilator Protocol order as well (see protocol at bottom of note). CXR Findings:  XR CHEST PORTABLE    Result Date: 1/28/2023  Stable cardiomegaly with mild central pulmonary congestion which is less prominent. Hazy interstitial and ground-glass infiltrates or pulmonary edema scattered in the right lung which is more prominent. Mild left basilar atelectasis or consolidation and small left pleural effusion which is less prominent. No change in left subclavian catheter. The findings from the last RT Protocol Assessment were as follows:   History Pulmonary Disease: Smoker 15 pack years or more  Respiratory Pattern: Regular pattern and RR 12-20 bpm  Breath Sounds: Slightly diminished and/or crackles  Cough: Strong, spontaneous, non-productive  Indication for Bronchodilator Therapy: On home bronchodilators  Bronchodilator Assessment Score: 3    Aerosolized bronchodilator medication orders have been revised according to the RT Inhaler-Nebulizer Bronchodilator Protocol below. Respiratory Therapist to perform RT Therapy Protocol Assessment initially then follow the protocol. Repeat RT Therapy Protocol Assessment PRN for score 0-3 or on second treatment, BID, and PRN for scores above 3. No Indications - adjust the frequency to every 6 hours PRN wheezing or bronchospasm, if no treatments needed after 48 hours then discontinue using Per Protocol order mode. If indication present, adjust the RT bronchodilator orders based on the Bronchodilator Assessment Score as indicated below.   Use Inhaler orders unless patient has one or more of the following: on home nebulizer, not able to hold breath for 10 seconds, is not alert and oriented, cannot activate and use MDI correctly, or respiratory rate 25 breaths per minute or more, then use the equivalent nebulizer order(s) with same Frequency and PRN reasons based on the score. If a patient is on this medication at home then do not decrease Frequency below that used at home. 0-3 - enter or revise RT bronchodilator order(s) to equivalent RT Bronchodilator order with Frequency of every 4 hours PRN for wheezing or increased work of breathing using Per Protocol order mode. 4-6 - enter or revise RT Bronchodilator order(s) to two equivalent RT bronchodilator orders with one order with BID Frequency and one order with Frequency of every 4 hours PRN wheezing or increased work of breathing using Per Protocol order mode. 7-10 - enter or revise RT Bronchodilator order(s) to two equivalent RT bronchodilator orders with one order with TID Frequency and one order with Frequency of every 4 hours PRN wheezing or increased work of breathing using Per Protocol order mode. 11-13 - enter or revise RT Bronchodilator order(s) to one equivalent RT bronchodilator order with QID Frequency and an Albuterol order with Frequency of every 4 hours PRN wheezing or increased work of breathing using Per Protocol order mode. Greater than 13 - enter or revise RT Bronchodilator order(s) to one equivalent RT bronchodilator order with every 4 hours Frequency and an Albuterol order with Frequency of every 2 hours PRN wheezing or increased work of breathing using Per Protocol order mode. RT to enter RT Home Evaluation for COPD & MDI Assessment order using Per Protocol order mode.     Electronically signed by Von Negrete RCP on 1/30/2023 at 12:45 AM

## 2023-01-30 NOTE — PROGRESS NOTES
Treatment time: 2 hrs    Net UF: 2500 ml    Pre weight: 103.5 kg  Post weight: 101 kg  EDW: 99.5 kg    Access used: Lt CW TDC  Access function: Well tolerated, 400 BFR    Medications or blood products given: Retacrit 10,000 units, Heparin dwells    Regular outpatient schedule: MWF    Summary of response to treatment: System clotted twice. MD aware. Heparin ordered for bolus and infusion. Pt did remain stable throughout treatment. Copy of dialysis treatment record placed in chart, to be scanned into EMR.

## 2023-01-30 NOTE — PROGRESS NOTES
Patients BP is at goal of SBP <140. Spoke to Dr. Genesis Gamez who has asked to wean off of nitro and put patient on nasal cannula. Dr. Genesis Gamez states that he is ok If patients SBP is in the 150-160's.     Buffy Walker RN

## 2023-01-30 NOTE — PLAN OF CARE
Hospitalist Plan of Care Note      PCP: Pcp No    Date of Admission: 1/29/2023    Chief Complaint: SOB    Subjective: no new c/o. Medications:  Reviewed    Infusion Medications    sodium chloride      nitroGLYCERIN 5 mcg/min (01/30/23 1647)     Scheduled Medications    apixaban  2.5 mg Oral BID    calcium acetate  1 capsule Oral TID WC    clopidogrel  75 mg Oral Daily    DULoxetine  30 mg Oral Daily    isosorbide dinitrate  20 mg Oral TID    metoprolol succinate  100 mg Oral BID    pantoprazole  40 mg Oral QAM AC    pravastatin  40 mg Oral Daily    QUEtiapine  50 mg Oral Nightly    sodium chloride flush  5-40 mL IntraVENous 2 times per day    mupirocin   Nasal BID     PRN Meds: calcium carbonate, docusate sodium, gabapentin, sennosides-docusate sodium, sodium chloride flush, sodium chloride, ondansetron **OR** ondansetron, polyethylene glycol, acetaminophen **OR** acetaminophen, ipratropium-albuterol      Intake/Output Summary (Last 24 hours) at 1/30/2023 1012  Last data filed at 1/30/2023 0900  Gross per 24 hour   Intake --   Output 0 ml   Net 0 ml       Physical Exam NOT necessarily Performed:    BP (!) 149/127   Pulse 79   Temp 98.6 °F (37 °C) (Axillary)   Resp 17   Ht 5' 9\" (1.753 m)   Wt 227 lb (103 kg)   SpO2 99%   BMI 33.52 kg/m²     Labs:   Recent Labs     01/28/23 2322 01/29/23 2141 01/30/23 0421   WBC 15.4* 13.5* 9.8   HGB 10.4* 9.5* 8.9*   HCT 32.0* 30.1* 27.3*    381 313     Recent Labs     01/28/23 2322 01/29/23 2141 01/30/23 0421   * 129* 132*   K 4.3 4.4 5.3*   CL 92* 90* 94*   CO2 25 20* 22   BUN 50* 63* 70*   CREATININE 6.4* 7.8* 8.1*   CALCIUM 9.4 9.1 8.5     Recent Labs     01/28/23 2322 01/29/23 2141   AST 11* 12*   ALT 8* 9*   BILITOT <0.2 <0.2   ALKPHOS 73 70     No results for input(s): INR in the last 72 hours.   Recent Labs     01/29/23  2141 01/30/23  0038 01/30/23  0421   TROPONINI 0.16* 0.16* 0.16*       Urinalysis:      Lab Results   Component Value Date/Time    NITRU Negative 12/25/2022 05:00 AM    WBCUA 6-9 12/25/2022 05:00 AM    BACTERIA Rare 12/25/2022 05:00 AM    RBCUA 3-4 12/25/2022 05:00 AM    BLOODU SMALL 12/25/2022 05:00 AM    SPECGRAV 1.020 12/25/2022 05:00 AM    GLUCOSEU 500 12/25/2022 05:00 AM       Consults:    IP CONSULT TO HOSPITALIST  IP CONSULT TO NEPHROLOGY  IP CONSULT TO HEART FAILURE NURSE/COORDINATOR  IP CONSULT TO DIETITIAN      Assessment/Plan:    Active Hospital Problems    Diagnosis     Ischemic cardiomyopathy [I25.5]      Priority: High    Dyslipidemia [E78.5]      Priority: High    DM2 (diabetes mellitus, type 2) ([facility] [E11.9]      Priority: High    Hypertensive urgency [I16.0]      Priority: Medium    Acute respiratory failure with hypoxemia (HCC) [J96.01]      Priority: Medium    HFrEF (heart failure with reduced ejection fraction) ([facility] [I50.20]      Priority: Medium    Asthma-COPD overlap syndrome ([facility] [J44.9]      Priority: Medium    PAF (paroxysmal atrial fibrillation) (HCC) [I48.0]     ESRD (end stage renal disease) ([facility] [N18.6]     ZAINAB on CPAP [G47.33, Z99.89]        Acute on chronic, respiratory failure with hypoxemia  Etiology is obscure, clinically he could have been in COPD exacerbation with reported wheezing on the field, however when I examined him his lungs are clear  -Spoke with ED physician and discussed treatment plan  -BiPAP initiated by squad, continued and ER and ICU. After a few hours we were able to wean him off to nasal cannula oxygen. Currently on 3 LPM  -CT did not show any significant pulmonary edema or pleural effusions  -Continue duo nebs every 4 hours  -We will transfer to Medr bed     Hypertensive urgency  -Started on nitro drip in the ED  -We will try to wean off nitro drip and transfer to MedSur bed  -Continue metoprolol, Isordil     ESRD on HD-nephrology consulted.   Patient reports compliance with HD treatment and did not miss last Friday     Troponin elevation-chronic elevation due to ESRD status. No ischemic changes on EKG. No anginal symptoms     CAD/chronic systolic CHF EF 20 to 60%  -Stable, continue Plavix, Isordil, metoprolol and statin  -Patient denied any anginal symptoms     Paroxysmal A. fib-continue metoprolol and Eliquis, currently in sinus rhythm     Obesity -  With Body mass index is 33.52 kg/m². Complicating assessment and treatment. Placing patient at risk for multiple co-morbidities as well as early death and contributing to the patient's presentation. Counseled on weight loss. ZAINAB - likely 2nd to obesity. Controlled on home CPAP/BiPap - continued, w/ outpatient f/u as previously arranged. DVT Prophylaxis: Eliquis/IPC     Recent Labs     01/28/23  2322 01/29/23  2141 01/30/23  0421    381 313     Diet: ADULT DIET; Regular; Low Sodium (2 gm)  Code Status: Full Code      PT/OT Eval Status: not yet ordered. Dispo - Patient is likely to remain in-house at least until Tues/Wed 32 Jan/1 Feb pending clinical course and subspecialty recs.     Tomasa Covington MD

## 2023-01-30 NOTE — DISCHARGE INSTRUCTIONS
Heart Failure Resources:    Heart Failure Interactive Workbook:   Go to [email or web address].com/aha-heartfailure for a Free Heart Failure Interactive Workbook provided by Melissa. This interactive workbook will provide information on Healthier Living with Heart Failure. Please copy and paste link into search bar. Use your mouse to scroll through the pages. HF [place] stephanie:   Heart Failure Free smart phone stephanie available for iPhone and Android download. Use your phone to track sodium intake, fluid intake, symptoms, and weight. Low Sodium Diet:  Go to www. [employer]. org website for wiseri which is Low Sodium! [employer] is a dialysis company, but this website offers free seasonal cookbooks. Each quarter, they will release 25-30 new recipes with a breakdown of calories, sodium, and glucose. Recipes:   Go to [email or web address]/recipes website for free recipes.

## 2023-01-30 NOTE — ACP (ADVANCE CARE PLANNING)
Advance Care Planning     General Advance Care Planning (ACP) Conversation    Date of Conversation: 1/29/2023  Conducted with: Patient with Decision Making Capacity    Healthcare Decision Maker:    Primary Decision Maker: Crystal Ann - Spouse - [phone]    Secondary Decision Maker: Gena Moon - Brother/Sister - [phone]  Click here to complete Healthcare Decision Makers including selection of the Healthcare Decision Maker Relationship (ie \"Primary\"). Today we documented Decision Maker(s) consistent with ACP documents on file. Content/Action Overview:   Has ACP document(s) on file - reflects the patient's care preferences  Reviewed DNR/DNI and patient elects Full Code (Attempt Resuscitation)        Length of Voluntary ACP Conversation in minutes:  <16 minutes (Non-Billable)    Bj Portillo RN

## 2023-01-30 NOTE — CARE COORDINATION
Case Management Assessment  Initial Evaluation    Date/Time of Evaluation: 1/30/2023 10:13 AM  Assessment Completed by: Carlos Santana RN    If patient is discharged prior to next notation, then this note serves as note for discharge by case management. Patient Name: Judi Faustin                   YOB: 1970  Diagnosis: Hypertensive emergency [I16.1]  Acute respiratory failure, unspecified whether with hypoxia or hypercapnia ([facility] [J96.00]  Acute respiratory failure with hypoxemia ([facility] [J96.01]                   Date / Time: 1/29/2023  9:20 PM    Patient Admission Status: Inpatient   Readmission Risk (Low < 19, Mod (19-27), High > 27): Readmission Risk Score: 48.6    Current PCP: Pcp No- given info for care clinic  PCP verified by CM? Yes    Chart Reviewed: Yes      History Provided by: Patient  Patient Orientation: Alert and Oriented    Patient Cognition: Alert    Hospitalization in the last 30 days (Readmission):  No    If yes, Readmission Assessment in  Navigator will be completed. Advance Directives:      Code Status: Full Code   Patient's Primary Decision Maker is: Named in [address] [place] [address]    Primary Decision Maker: AnnGracieCrystal - Spouse - [phone]    Secondary Decision Maker: Lyn Terrell - Brother/Sister - [phone]    Discharge Planning:    Patient lives with: Spouse/Significant Other Type of Home: House  Primary Care Giver: Self  Patient Support Systems include: Spouse/Significant Other   Current Financial resources:    Current community resources: Other (Comment) ([facility])  Current services prior to admission: C-pap, Oxygen Therapy (Unsure of 02 provider)            Current DME:              Type of Home Care services:  Nursing Services    ADLS  Prior functional level: Independent in ADLs/IADLs  Current functional level: Independent in ADLs/IADLs    PT AM-PAC:   /24  OT AM-PAC:   /24    Family can provide assistance at DC:  Other (comment) (as available)  Would you like Case Management to discuss the discharge plan with any other family members/significant others, and if so, who? Plans to Return to Present Housing: Yes  Other Identified Issues/Barriers to RETURNING to current housing:   Potential Assistance needed at discharge: [address] DME:    Patient expects to discharge to: [address] [place] [address] for transportation at discharge:      Financial    Payor: Radha Aguila / Plan: [address] / Product Type: *No Product type* /     Does insurance require precert for SNF: Yes    Potential assistance Purchasing Medications: No  Meds-to-Beds request: Yes      [address] [phone] - F [phone]  [address]  Phone: [phone] Fax: [phone]    [facility], [facility] [phone]  [address] [place] [address]  Phone: [phone] Fax: [address] [facility], [address] [phone] - F [phone]  [address] [address]  Phone: [phone] Fax: [phone]    [facility] [address] [phone] [facility] [phone]  [facility] [address]  Phone: [phone] Fax: [phone]      Notes:    Factors facilitating achievement of predicted outcomes: Family support    Barriers to discharge: No caregiver support    Additional Case Management Notes: Seeks [facility] if available. Prev w [facility]- never opened. [facility] cannot accept. Call to [facility]- they can only take if pt obtains a PCP. Supportive may be able to accept and provide medical covg thru [facility].  HD at [facility] for Transition of Care is related to the following treatment goals of Hypertensive emergency [I16.1]  Acute respiratory failure, unspecified whether with hypoxia or hypercapnia ([facility] [J96.00]  Acute respiratory failure with hypoxemia ([facility] [Q71.77]    IF APPLICABLE: The Patient and/or patient representative Dima Irwin and his family were provided with a choice of provider and agrees with the discharge plan. Freedom of choice list with basic dialogue that supports the patient's individualized plan of care/goals and shares the quality data associated with the providers was provided to: Patient   Patient Representative Name:       The Patient and/or Patient Representative Agree with the Discharge Plan?  Yes    Brittaney Bernardo RN  Case Management Department  Ph: [phone] Fax: [phone]

## 2023-01-30 NOTE — ED PROVIDER NOTES
[address] ENCOUNTER        Pt Name: Caryle Skelton  MRN: [id number]  [place] 1970  Date of evaluation: 1/29/2023  Provider: Trinda Hodgkin, MD  PCP: Pcp No  Note Started: 8:42 AM EST 1/30/23    CHIEF COMPLAINT       Chief Complaint   Patient presents with    Shortness of Breath     Seen here yesterday for the same. HISTORY OF PRESENT ILLNESS: 1 or more Elements             Caryle Skelton is a 45 y.o. male who presents with shortness of breath brought in by EMS on CPAP. I just saw this patient within the last 24 hours he had come in for similar symptoms. The patient had felt better and was discharged home is hypertensive emergency. The patient states that since he got home he has been short of breath he was hoping that his symptoms would pass and he was not able to get better so he came back to the emergency room. EMS noted wheezing on exam they gave 1 breathing treatments. Patient states he has been coughing and he had a fever. He denies any chest pain. It is not yet his next scheduled dialysis. Nursing Notes were all reviewed and agreed with or any disagreements were addressed in the HPI. REVIEW OF SYSTEMS :      Review of Systems    Positives and Pertinent negatives as per HPI.      SURGICAL HISTORY     Past Surgical History:   Procedure Laterality Date    AORTIC VALVE REPLACEMENT N/A 10/15/2019    TRANSCATHETER AORTIC VALVE REPLACEMENT FEMORAL APPROACH performed by Koffi Arrington MD at [address] [facility] Right 7/2/2019    PERITONEAL DIALYSIS CATHETER REMOVAL performed by Vanessa Torres MD at [address] [place] [address]  2/29/2015    WNL    CORONARY ANGIOPLASTY WITH STENT PLACEMENT  05/26/15    CYST REMOVAL  08/14/2013    EXCISION CYSTS, NECK X2 AND ABDOMINAL benign    DIAGNOSTIC CARDIAC CATH LAB PROCEDURE      DIALYSIS FISTULA CREATION Left 10/30/2017    LEFT BRACHIAL CEPHALIC FISTULA    DIALYSIS FISTULA CREATION Left 3/27/2019    LIGATION  AV FISTULA performed by Manuela Collins MD at [address] [facility], COLON, DIAGNOSTIC      IR TUNNELED [address] 5 YEARS  3/21/2022    IR TUNNELED CATHETER PLACEMENT GREATER THAN 5 YEARS 3/21/2022 [facility]    IR TUNNELED CATHETER PLACEMENT GREATER THAN 5 YEARS  4/21/2022    IR TUNNELED CATHETER PLACEMENT GREATER THAN 5 YEARS 4/21/2022 [facility]    IR TUNNELED CATHETER PLACEMENT GREATER THAN 5 YEARS  4/26/2022    IR TUNNELED CATHETER PLACEMENT GREATER THAN 5 YEARS 4/26/2022 [facility]    IR TUNNELED CATHETER PLACEMENT GREATER THAN 5 YEARS  6/23/2022    IR TUNNELED CATHETER PLACEMENT GREATER THAN 5 YEARS 6/23/2022 [facility]    OTHER SURGICAL HISTORY  02/01/2017    laparoscopic cholecystectomy with intraoperative cholangiogram    OTHER SURGICAL HISTORY  2018    PORT PLACEMENT  - vas cath    OTHER SURGICAL HISTORY Bilateral 06/26/2018    laprascopic peritoneal dialysis catheter placement    OTHER SURGICAL HISTORY Right 09/2018    peritoneal dialysis port placed on right side of abdomen    OTHER SURGICAL HISTORY  05/28/2019    PTA/Stenting R External Iliac artery    MS LAP INSERTION TUNNELED INTRAPERITONEAL CATHETER N/A 9/21/2018    LAPAROSCOPIC PERITONEAL DIALYSIS CATHETER REPLACEMENT performed by Junior Mortensen MD at [address] [facility] [address] 2/24/2022    PERINEAL ABCESS DRAINAGE performed by Junior Mortensen MD at [facility] N/A 10/2/2022    THORACENTESIS ULTRASOUND performed by Jose Daniel Magallon MD at [address]  01/06/2016    UPPER GASTROINTESTINAL ENDOSCOPY  01/29/2017    possible candida, otherwise normal appearing    VASCULAR SURGERY  aprx 2 years ago    2 stents placed, each side of groin       CURRENTMEDICATIONS       Current Discharge Medication List        CONTINUE these medications which have NOT CHANGED    Details   docusate sodium (DOK) 100 MG capsule Take 1 capsule by mouth as needed for Constipation      sennosides-docusate sodium (SENOKOT-S) 8.6-50 MG tablet Take 1 tablet by mouth as needed for Constipation    Associated Diagnoses: Constipation, unspecified constipation type      apixaban (ELIQUIS) 2.5 MG TABS tablet Take 1 tablet by mouth 2 times daily  Qty: 60 tablet, Refills: 0      gabapentin (NEURONTIN) 100 MG capsule Take 2 capsules by mouth 3 times daily as needed (neuropathic pain) for up to 10 days.   Qty: 60 capsule, Refills: 0    Associated Diagnoses: Dialysis patient, noncompliant ([facility]      metoprolol succinate (TOPROL XL) 100 MG extended release tablet Take 1 tablet by mouth in the morning and at bedtime  Qty: 30 tablet, Refills: 3      cyclobenzaprine (FLEXERIL) 5 MG tablet Muscle spasms    Associated Diagnoses: Chronic pain syndrome      QUEtiapine (SEROQUEL) 50 MG tablet TAKE 1 TABLET BY MOUTH EVERY EVENING  Qty: 30 tablet, Refills: 10    Associated Diagnoses: Insomnia, unspecified type; Mood disorder ([facility])      isosorbide dinitrate (ISORDIL) 20 MG tablet Take 1 tablet by mouth 3 times daily  Qty: 90 tablet, Refills: 3      clopidogrel (PLAVIX) 75 MG tablet Take 1 tablet by mouth daily  Qty: 90 tablet, Refills: 3      pantoprazole (PROTONIX) 40 MG tablet TAKE 1 TABLET BY MOUTH EVERY MORNING BEFORE BREAKFAST  Qty: 30 tablet, Refills: 10    Associated Diagnoses: Gastroesophageal reflux disease without esophagitis      DULoxetine (CYMBALTA) 30 MG extended release capsule TAKE 1 CAPSULE BY MOUTH EVERY DAY  Qty: 30 capsule, Refills: 10    Associated Diagnoses: Depression, unspecified depression type      pravastatin (PRAVACHOL) 40 MG tablet Take 1 tablet by mouth daily  Qty: 90 tablet, Refills: 3      B Complex-C-Folic Acid (VIRT-CAPS) 1 MG CAPS TAKE 1 CAPSULE BY MOUTH EVERY DAY  Qty: 90 capsule, Refills: 1      Calcium Acetate, Phos Binder, 667 MG CAPS TAKE 1 CAPSULE BY MOUTH THREE TIMES DAILY WITH MEALS  Qty: 90 capsule, Refills: 3      nitroGLYCERIN (NITROSTAT) 0.4 MG SL tablet DISSOLVE 1 TABLET UNDER THE TONGUE AS NEEDED FOR CHEST PAIN EVERY 5 MINUTES UP TO 3 TIMES. IF NO RELIEF CALL 911. Qty: 25 tablet, Refills: 10    Associated Diagnoses: Coronary artery disease involving native heart without angina pectoris, unspecified vessel or lesion type      vitamin D (ERGOCALCIFEROL) 12513 units CAPS capsule TK 1 C PO WEEKLY  Refills: 11      Alcohol Swabs PADS USE AS DIRECTED  Qty: 300 each, Refills: 3      ipratropium-albuterol (DUONEB) 0.5-2.5 (3) MG/3ML SOLN nebulizer solution Inhale 3 mLs into the lungs every 6 hours as needed for Shortness of Breath  Qty: 360 mL, Refills: 1      calcium carbonate (TUMS) 500 MG chewable tablet Take 1 tablet by mouth 3 times daily as needed for Heartburn.              ALLERGIES     Morphine    FAMILYHISTORY       Family History   Problem Relation Age of Onset    Diabetes Mother     Heart Disease Father     Kidney Disease Sister         stage 4-kidney failure    Cancer Sister     Heart Disease Sister     Obesity Sister     Cancer Sister     Heart Disease Sister     Obesity Sister     Alcohol Abuse Brother         SOCIAL HISTORY       Social History     Tobacco Use    Smoking status: Former     Packs/day: 0.50     Years: 33.00     Pack years: 16.50     Types: Cigarettes     Quit date: 2020     Years since quittin.7    Smokeless tobacco: Never    Tobacco comments:     States quit 2021   Vaping Use    Vaping Use: Never used   Substance Use Topics    Alcohol use: Not Currently     Alcohol/week: 0.0 standard drinks     Comment: occ    Drug use: No       SCREENINGS        Robert Coma Scale  Eye Opening: Spontaneous  Best Verbal Response: Oriented  Best Motor Response: Obeys commands  [place] Coma Scale Score: 15                WA Assessment  BP: (!) 141/74  Heart Rate: 81           PHYSICAL EXAM  1 or more Elements     ED Triage Vitals [23] BP Temp Temp Source Heart Rate Resp SpO2 Height Weight   (!) 198/108 98.1 °F (36.7 °C) Axillary (!) 114 22 98 % 5' 9\" (1.753 m) 227 lb (103 kg)       Physical Exam  Vitals and nursing note reviewed. Constitutional:       Appearance: Normal appearance. He is well-developed. He is ill-appearing. Comments: Chronically ill-appearing adult male in acute respiratory distress. HENT:      Head: Normocephalic and atraumatic. Right Ear: External ear normal.      Left Ear: External ear normal.      Nose: Nose normal.   Eyes:      General: No scleral icterus. Right eye: No discharge. Left eye: No discharge. Conjunctiva/sclera: Conjunctivae normal.   Neck:      Vascular: JVD present. Cardiovascular:      Rate and Rhythm: Normal rate and regular rhythm. Heart sounds: Normal heart sounds. Pulmonary:      Effort: Pulmonary effort is normal. No respiratory distress. Breath sounds: Normal breath sounds. No wheezing or rales. Abdominal:      General: Bowel sounds are normal. There is no distension. Palpations: Abdomen is soft. Tenderness: There is no abdominal tenderness. There is no guarding or rebound. Musculoskeletal:      Cervical back: Neck supple. Skin:     Coloration: Skin is not pale. Neurological:      Mental Status: He is alert.    Psychiatric:         Mood and Affect: Mood normal.         Behavior: Behavior normal.           DIAGNOSTIC RESULTS   LABS:    Labs Reviewed   CBC WITH AUTO DIFFERENTIAL - Abnormal; Notable for the following components:       Result Value    WBC 13.5 (*)     RBC 3.22 (*)     Hemoglobin 9.5 (*)     Hematocrit 30.1 (*)     RDW 16.3 (*)     Neutrophils Absolute 12.1 (*)     Lymphocytes Absolute 0.6 (*)     All other components within normal limits   COMPREHENSIVE METABOLIC PANEL W/ REFLEX TO MG FOR LOW K - Abnormal; Notable for the following components:    Sodium 129 (*)     Chloride 90 (*)     CO2 20 (*)     Anion Gap 19 (*) Glucose 180 (*)     BUN 63 (*)     Creatinine 7.8 (*)     Est, Glom Filt Rate 8 (*)     ALT 9 (*)     AST 12 (*)     All other components within normal limits    Narrative:     [place] tel. [phone],  Chemistry results called to and read back by NAVJOT Ashley, 01/29/2023  22:26, by MARIO   TROPONIN - Abnormal; Notable for the following components:    Troponin 0.16 (*)     All other components within normal limits    Narrative:     [address] [id number] [phone],  Chemistry results called to and read back by NAVJOT Ashley, 01/29/2023  22:26, by MARIO   CBC WITH AUTO DIFFERENTIAL - Abnormal; Notable for the following components:    RBC 2.95 (*)     Hemoglobin 8.9 (*)     Hematocrit 27.3 (*)     RDW 16.4 (*)     Neutrophils Absolute 9.4 (*)     Lymphocytes Absolute 0.2 (*)     All other components within normal limits    Narrative:     Collection has been rescheduled by [facility] at 01/30/2023 04:16 Reason: No   suitable vein for venipuncture   TROPONIN - Abnormal; Notable for the following components:    Troponin 0.16 (*)     All other components within normal limits   TROPONIN - Abnormal; Notable for the following components:    Troponin 0.16 (*)     All other components within normal limits    Narrative:     [facility] tel. [phone],  Chemistry results called to and read back by navjot ferraro, 01/30/2023  05:17, by [facility]  Collection has been rescheduled by [facility] at 01/30/2023 04:16 Reason: No   suitable vein for venipuncture   BASIC METABOLIC PANEL W/ REFLEX TO MG FOR LOW K - Abnormal; Notable for the following components:    Sodium 132 (*)     Potassium reflex Magnesium 5.3 (*)     Chloride 94 (*)     Glucose 258 (*)     BUN 70 (*)     Creatinine 8.1 (*)     Est, Glom Filt Rate 7 (*)     All other components within normal limits    Narrative:     Parish Mak tel. [phone],  Chemistry results called to and read back by navjot ferraro, 01/30/2023  05:17, by [facility]  Collection has been rescheduled by [facility] at 01/30/2023 04:16 Reason: No   suitable vein for venipuncture   COVID-19 & INFLUENZA COMBO   LACTIC ACID       When ordered only abnormal lab results are displayed. All other labs were within normal range or not returned as of this dictation. EKG: Twelve-lead EKG as read and interpreted by myself show sinus tachycardia at a rate of 110 beats point, per interval QRS normal.  Normal QTC. Normal axis no acute ischemic findings. No significant changes when compared to earlier EKG December 25, 2023. RADIOLOGY:   Non-plain film images such as CT, Ultrasound and MRI are read by the radiologist. Plain radiographic images are visualized and preliminarily interpreted by the ED Provider with the below findings:        Interpretation per the Radiologist below, if available at the time of this note:    CT CHEST WO CONTRAST   Final Result   Small left pleural effusion which is much less prominent with moderate   atelectasis and consolidation along the left lung base extending into the   lingula which is less prominent      Small right pleural effusion layering posteriorly with moderate atelectasis   and consolidation posteriorly extending into the upper chest which is more   prominent. Mild pleural based nodule or scarring vs infiltrate along the right upper   lobe laterally and a 5 mm nodule along the left upper lobe which is   unchanged. Recommend follow-up. Mild mediastinal adenopathy which is more prominent. Suggest PET scan   correlation. Left subclavian catheter which is unchanged and position. 1.6 cm left adrenal adenoma which is unchanged. Fleischner Society guidelines for follow-up and management of incidentally   detected pulmonary nodules      Multiple Solid Nodules:      Nodule size less than 6 mm   In a low-risk patient, no routine follow-up. In a high-risk patient, optional CT at 12 months.       Nodule size equals 6-8 mm   In a low-risk patient, CT at 3-6 months, then consider CT at 18-24 months. In a high-risk patient, CT at 3-6 months, then CT at 18-24 months. Nodule size greater than 8 mm   In a low-risk patient, CT at 3-6 months, then consider CT at 18-24 months. In a high-risk patient, CT at 3-6 months, then CT at 18-24 months. - Low risk patients include individuals with minimal or absent history of   smoking and other known risk factors. - High risk patients include individuals with a history or smoking or known   risk factors. Radiology 2017 http://pubs. rsna.org/doi/full/10.1148/radiol. 8963628790           CT CHEST WO CONTRAST    Result Date: 1/29/2023  EXAMINATION: CT OF THE CHEST WITHOUT CONTRAST 1/29/2023 10:30 pm TECHNIQUE: CT of the chest was performed without the administration of intravenous contrast. Multiplanar reformatted images are provided for review. Automated exposure control, iterative reconstruction, and/or weight based adjustment of the mA/kV was utilized to reduce the radiation dose to as low as reasonably achievable. COMPARISON: 09/28/2022 HISTORY: ORDERING SYSTEM PROVIDED HISTORY: SOB TECHNOLOGIST PROVIDED HISTORY: Reason for exam:->SOB Decision Support Exception - unselect if not a suspected or confirmed emergency medical condition->Emergency Medical Condition (MA) Reason for Exam: sob, was here yesterday for same FINDINGS: Mediastinum:   There is a left subclavian catheter in place with the tip in the distal superior vena cava which is unchanged. There is mild plaque throughout the aorta which is minimally dilated and unchanged. There are multiple small lymph nodes along the AP window and pretracheal region with the largest seen anterior to the maryam measuring 2.6 cm which measured 1.8 cm previously. There is a metallic aortic valve in place. The heart is mildly enlarged but unchanged Lungs/pleura: There is a small left pleural effusion layering posteriorly and laterally which is less prominent.   There is a small right pleural effusion layering posteriorly which is prominent. There is mild 2 moderate atelectasis and consolidation lung bases posteriorly which is less prominent on the left and is increased on the right. There is some mild pleural-based opacity along the right upper lobe laterally which is more apparent. There is a 5 mm nodule calcified nodule left upper lobe is unchanged. There is subsegmental lingular opacity which is less prominent. Upper Abdomen: There is a 1.6 cm left adrenal adenoma which is unchanged. The right adrenal is normal.  The visualized liver and spleen are unremarkable Soft Tissues/Bones: The bones are intact. No aggressive osseous lesion is seen. There are mild compression fractures along the lower thoracic spine which are unchanged     Small left pleural effusion which is much less prominent with moderate atelectasis and consolidation along the left lung base extending into the lingula which is less prominent Small right pleural effusion layering posteriorly with moderate atelectasis and consolidation posteriorly extending into the upper chest which is more prominent. Mild pleural based nodule or scarring vs infiltrate along the right upper lobe laterally and a 5 mm nodule along the left upper lobe which is unchanged. Recommend follow-up. Mild mediastinal adenopathy which is more prominent. Suggest PET scan correlation. Left subclavian catheter which is unchanged and position. 1.6 cm left adrenal adenoma which is unchanged. Fleischner Society guidelines for follow-up and management of incidentally detected pulmonary nodules Multiple Solid Nodules: Nodule size less than 6 mm In a low-risk patient, no routine follow-up. In a high-risk patient, optional CT at 12 months. Nodule size equals 6-8 mm In a low-risk patient, CT at 3-6 months, then consider CT at 18-24 months. In a high-risk patient, CT at 3-6 months, then CT at 18-24 months.  Nodule size greater than 8 mm In a low-risk patient, CT at 3-6 months, then consider CT at 18-24 months. In a high-risk patient, CT at 3-6 months, then CT at 18-24 months. - Low risk patients include individuals with minimal or absent history of smoking and other known risk factors. - High risk patients include individuals with a history or smoking or known risk factors. Radiology 2017 http://pubs. rsna.org/doi/full/10.1148/radiol. 4907623427           No results found. PROCEDURES   Unless otherwise noted below, none     Procedures    CRITICAL CARE TIME   Total Critical Care time was 45 minutes, excluding separately reportable procedures. There was a high probability of clinically significant/life threatening deterioration in the patient's condition which required my urgent intervention. 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), CAD (coronary artery disease), Cardiomyopathy ([address] (04/19/2019), CHF (congestive heart failure) ([address] Chronic pain, COPD (chronic obstructive pulmonary disease) ([address] Depression, Diabetes mellitus ([address] Difficult intravenous access, Emphysema of lung ([address] ESRD (end stage renal disease) on dialysis ([address] Fear of needles, Gastric ulcer, GERD (gastroesophageal reflux disease), Heart valve problem, Hemodialysis patient ([address] History of spinal fracture, blood clots, Hyperlipidemia, Hypertension, MI (myocardial infarction) ([address] (2019), Neuromuscular disorder ([address] Numbness and tingling in left arm, Pneumonia, PONV (postoperative nausea and vomiting), Prolonged emergence from general anesthesia, Sleep apnea, Stroke ([address] TIA (transient ischemic attack), and Unspecified diseases of blood and blood-forming organs.      EMERGENCY DEPARTMENT COURSE and DIFFERENTIAL DIAGNOSIS/MDM:   Vitals:    Vitals:    01/30/23 0437 01/30/23 0600 01/30/23 0800 01/30/23 0832   BP: 128/66 138/74  (!) 141/74   Pulse: 83  81 Resp:   18    Temp:       TempSrc:       SpO2:   95%    Weight:       Height:           Patient was given the following medications:  Medications   nitroGLYCERIN 50 mg in dextrose 5% 250 mL infusion (5 mcg/min IntraVENous Rate/Dose Change 1/30/23 0457)   apixaban (ELIQUIS) tablet 2.5 mg (2.5 mg Oral Given 1/30/23 0832)   calcium acetate (PHOSLO) capsule 667 mg (667 mg Oral Given 1/30/23 0832)   calcium carbonate (TUMS) chewable tablet 500 mg (has no administration in time range)   clopidogrel (PLAVIX) tablet 75 mg (75 mg Oral Given 1/30/23 0832)   docusate sodium (COLACE) capsule 100 mg (has no administration in time range)   DULoxetine (CYMBALTA) extended release capsule 30 mg (30 mg Oral Given 1/30/23 0832)   gabapentin (NEURONTIN) capsule 200 mg (has no administration in time range)   isosorbide dinitrate (ISORDIL) tablet 20 mg (20 mg Oral Given 1/30/23 0832)   metoprolol succinate (TOPROL XL) extended release tablet 100 mg (100 mg Oral Given 1/30/23 0832)   pantoprazole (PROTONIX) tablet 40 mg (40 mg Oral Given 1/30/23 0634)   pravastatin (PRAVACHOL) tablet 40 mg (40 mg Oral Given 1/30/23 0832)   QUEtiapine (SEROQUEL) tablet 50 mg (50 mg Oral Given 1/30/23 0041)   sennosides-docusate sodium (SENOKOT-S) 8.6-50 MG tablet 1 tablet (has no administration in time range)   sodium chloride flush 0.9 % injection 5-40 mL (has no administration in time range)   sodium chloride flush 0.9 % injection 5-40 mL (has no administration in time range)   0.9 % sodium chloride infusion (has no administration in time range)   ondansetron (ZOFRAN-ODT) disintegrating tablet 4 mg (has no administration in time range)     Or   ondansetron (ZOFRAN) injection 4 mg (has no administration in time range)   polyethylene glycol (GLYCOLAX) packet 17 g (has no administration in time range)   acetaminophen (TYLENOL) tablet 650 mg (has no administration in time range)     Or   acetaminophen (TYLENOL) suppository 650 mg (has no administration in time range)   mupirocin (BACTROBAN) 2 % ointment ( Nasal Given 1/30/23 0832)   ipratropium-albuterol (DUONEB) nebulizer solution 1 ampule (has no administration in time range)   ipratropium-albuterol (DUONEB) nebulizer solution 1 ampule (1 ampule Inhalation Given 1/29/23 2201)   methylPREDNISolone sodium (SOLU-MEDROL) injection 40 mg (40 mg IntraVENous Given 1/29/23 2157)             Is this patient to be included in the SEP-1 Core Measure due to severe sepsis or septic shock? No   Exclusion criteria - the patient is NOT to be included for SEP-1 Core Measure due to: Infection is not suspected    Chronic Conditions:     CONSULTS: (Who and What was discussed)  IP CONSULT TO HOSPITALIST  IP CONSULT TO NEPHROLOGY                CC/HPI Summary, DDx, ED Course, and Reassessment: Adult male who comes in in acute respiratory distress. This patient is seen as a medical resuscitation code, his baseline cardiac blood pressure and pulse oximetry monitoring. Patient was placed on BiPAP as he was transition from CPAP. Laboratory studies chest x-ray and EKG ordered. The patient is found to be hypertensive and tachycardic. Again I just saw this patient with similar presentation just within 24 hours. This time I placed the patient on a nitroglycerin drip for hypertensive emergency. Diagnostic results negative for flu and COVID-19. Lactic acid is normal.  He does have leukocytosis. Patient has worsening renal function however this likely indicates the need for dialysis. His potassium is however normal.  Troponin is elevated however this is appropriate given the patient's history of end-stage renal disease. His chest x-ray instead of a chest x-ray I do obtain a CT scan as the patient has returned to the ER for similar complaints. He has a small left pleural effusion and small right-sided pleural effusion. No other acute findings.     Given that the patient's symptoms have continued to persist required a second visit to the emergency room I do believe that it is in the patient's best interest to be admitted to the hospital despite his care plan is in place. I discussed this with the patient the patient expressed understanding and is agreeable with the plan for admission. A consultto the hospitalist on duty was agreed to admit this patient to his service. Patient's vital signs were repeated and has trended down on drip. Patient clinically improving. Disposition Considerations (tests considered but not done, Shared Decision Making, Pt Expectation of Test or Tx.):         I am the Primary Clinician of Record. FINAL IMPRESSION      1. Acute respiratory failure, unspecified whether with hypoxia or hypercapnia ([facility] [address]    2. Hypertensive emergency          DISPOSITION/PLAN     DISPOSITION Admitted 01/29/2023 11:26:12 PM      PATIENT REFERRED TO:  No follow-up provider specified.     DISCHARGE MEDICATIONS:  Current Discharge Medication List          DISCONTINUED MEDICATIONS:  Current Discharge Medication List                 (Please note that portions of this note were completed with a voice recognition program.  Efforts were made to edit the dictations but occasionally words are mis-transcribed.)    Noemí Gooden MD (electronically signed)          Noemí Gooden MD  01/30/23 4161

## 2023-01-30 NOTE — PROGRESS NOTES
01/29/23 2131   NIV Type   $NIV $Daily Charge   NIV Started/Stopped On   Equipment Type v60   Mode Bilevel   Mask Type Full face mask   Mask Size Medium   Settings/Measurements   PIP Observed 15 cm H20   IPAP 14 cmH20   CPAP/EPAP 6 cmH2O   Vt (Measured) 599 mL   Rate Ordered 8   Resp 28   FiO2  50 %   Minute Volume (L/min) 19.5 Liters   Mask Leak (lpm) 49 lpm   Comfort Level Good   Using Accessory Muscles No   SpO2 100   Patient's Home Machine No   Alarm Settings   Alarms On Y   Low Pressure (cmH2O) 6 cmH2O   High Pressure (cmH2O) 30 cmH2O   Apnea (secs) 20 secs   RR Low (bpm) 6   RR High (bpm) 40 br/min

## 2023-01-30 NOTE — PROGRESS NOTES
Patient's EF (Ejection Fraction) is less than 40%    Heart Failure Medications:  Diuretics[de-identified] None    (One of the following REQUIRED for EF <40%/SYSTOLIC FAILURE but MAY be used in EF% >40%/DIASTOLIC FAILURE)        ACE[de-identified] None        ARB[de-identified] None         ARNI[de-identified] None    (Beta Blockers)  NON- Evidenced Based Beta Blocker (for EF% >40%/DIASTOLIC FAILURE): Metoprolol TARTrate- Lopressor    Evidenced Based Beta Blocker::(REQUIRED for EF% <40%/SYSTOLIC FAILURE) Metoprolol SUCCinate- Toprol XL, Carvedilol- Coreg, Bisoprolol-Zebeta, and None  . .................................................................................................................................................. Patient's Last Weight: 222 lbs obtained by bed scale. Difference in weight is 6 pounds less than last documented weight.     Intake/Output Summary (Last 24 hours) at 1/30/2023 1822  Last data filed at 1/30/2023 0900  Gross per 24 hour   Intake --   Output 0 ml   Net 0 ml       CHF Education booklet provided: yes    Comorbidities Reviewed Yes  Patient has a past medical history of Ambulatory dysfunction, Aortic stenosis, Arthritis, Asthma, Bilateral hilar adenopathy syndrome, CAD (coronary artery disease), Cardiomyopathy ([address] CHF (congestive heart failure) ([address] Chronic pain, COPD (chronic obstructive pulmonary disease) ([address] Depression, Diabetes mellitus ([address] Difficult intravenous access, Emphysema of lung ([address] ESRD (end stage renal disease) on dialysis ([address] Fear of needles, Gastric ulcer, GERD (gastroesophageal reflux disease), Heart valve problem, Hemodialysis patient ([facility] [address] History of spinal fracture, Hx of blood clots, Hyperlipidemia, Hypertension, MI (myocardial infarction) ([facility] [address] Neuromuscular disorder ([facility] [address] Numbness and tingling in left arm, Pneumonia, PONV (postoperative nausea and vomiting), Prolonged emergence from general anesthesia, Sleep apnea, Stroke ([address] TIA (transient ischemic attack), and Unspecified diseases of blood and blood-forming organs.     >> For CHF and Comorbidity Education Time and Topics, please see Education Tab. Progressive Mobility Assessment:  What is this patient's Current Level of Mobility?: Ambulatory-Up Ad [place]  How was this patient Mobilized today?: Edge of Bed, Up to Chair, Bedside Commode,  Up to Toilet/Shower, and Up in Room                 With Whom? Nurse, PT, OT, and Self                 Level of Difficulty/Assistance: Independent     Pt is currently on 2 L O2. Pt without lower extremity edema.  Patient's weights and intake/output reviewed:      Patient and/or Family's stated Goal of Care this Admission: reduce shortness of breath, increase activity tolerance, better understand heart failure and disease management, be more comfortable, and reduce lower extremity edema prior to discharge

## 2023-01-30 NOTE — PROGRESS NOTES
Shift: [id number]    Admitting diagnosis: ARF    Presentation to hospital: SOB; on CPAP via EMS    Surgery:no    Nursing assessment at handoff  stable    Emergency Contact/POA: Melisa Hamm  Family updated: yes @ bedside     Most recent vitals: BP (!) 140/88   Pulse 72   Temp 97.8 °F (36.6 °C)   Resp 18   Ht 5' 9\" (1.753 m)   Wt 222 lb 10.6 oz (101 kg)   SpO2 100%   BMI 32.88 kg/m²      Rhythm: Normal Sinus Rhythm      NC/HFNC- 2 lpm (Baseline @ 3L home)  Respiratory support: - No ventilator support    Vent days: Day 0      Increased O2 requirements: No    Admission weight Weight: 227 lb (103 kg)  Today's weight   Wt Readings from Last 1 Encounters:   01/30/23 222 lb 10.6 oz (101 kg)         UOP >30ml/hr: no     [place]arton need assessed each shift: N/A    Restraints: N/A  Order current and documentation up to date? Lines/Drains  LDA Insertion Date Discontinued Date Dressing Changes   PIV  EJ 1/29     TLC       Arterial       Wharton       Vas Cath 4/26/22     ETT       Surgical drains        Night Shift Hospitalist Interventions    Problem(Brief) Date Time Intervention Physician contacted                                               Drip rates at handoff:    sodium chloride      nitroGLYCERIN 5 mcg/min (01/30/23 1262)       Hospital Course Daily Updates:  Admit Day# 1  -HD @ bedside  -Independent  -Nitro gtt off  -Transfer orders placed      Lab Data:   CBC:   Recent Labs     01/29/23 2141 01/30/23 0421   WBC 13.5* 9.8   HGB 9.5* 8.9*   HCT 30.1* 27.3*   MCV 93.5 92.4    313     BMP:    Recent Labs     01/29/23 2141 01/30/23  0421   * 132*   K 4.4 5.3*   CO2 20* 22   BUN 63* 70*   CREATININE 7.8* 8.1*     LIVR:   Recent Labs     01/28/23 2322 01/29/23 2141   AST 11* 12*   ALT 8* 9*     PT/INR:   Recent Labs     01/28/23 2322 01/29/23 2141   PROT 7.8 7.1     APTT: No results for input(s): APTT in the last 72 hours.   ABG: No results for input(s): PHART, YOX5UVR, PO2ART in the last 72 hours.   Consults (if GI or Nephrology- which group?)-  nephrology ([place])

## 2023-01-30 NOTE — CARE COORDINATION
Suportive [facility] able to accept w Medical House Calls to cover lack of PCP.   Michelle Box RN

## 2023-01-31 LAB
ANION GAP SERPL CALCULATED.3IONS-SCNC: 14 MMOL/L (ref 3–16)
BUN BLDV-MCNC: 70 MG/DL (ref 7–20)
CALCIUM SERPL-MCNC: 8.2 MG/DL (ref 8.3–10.6)
CHLORIDE BLD-SCNC: 92 MMOL/L (ref 99–110)
CO2: 23 MMOL/L (ref 21–32)
CREAT SERPL-MCNC: 7.5 MG/DL (ref 0.9–1.3)
GFR SERPL CREATININE-BSD FRML MDRD: 8 ML/MIN/{1.73_M2}
GLUCOSE BLD-MCNC: 236 MG/DL (ref 70–99)
HCT VFR BLD CALC: 23.8 % (ref 40.5–52.5)
HEMOGLOBIN: 7.6 G/DL (ref 13.5–17.5)
MAGNESIUM: 2.2 MG/DL (ref 1.8–2.4)
MCH RBC QN AUTO: 29.6 PG (ref 26–34)
MCHC RBC AUTO-ENTMCNC: 31.8 G/DL (ref 31–36)
MCV RBC AUTO: 93.1 FL (ref 80–100)
PDW BLD-RTO: 16.6 % (ref 12.4–15.4)
PLATELET # BLD: 290 K/UL (ref 135–450)
PMV BLD AUTO: 7.6 FL (ref 5–10.5)
POTASSIUM SERPL-SCNC: 4.3 MMOL/L (ref 3.5–5.1)
RBC # BLD: 2.56 M/UL (ref 4.2–5.9)
SODIUM BLD-SCNC: 129 MMOL/L (ref 136–145)
WBC # BLD: 8.4 K/UL (ref 4–11)

## 2023-01-31 PROCEDURE — 85027 COMPLETE CBC AUTOMATED: CPT

## 2023-01-31 PROCEDURE — 2060000000 HC ICU INTERMEDIATE R&B

## 2023-01-31 PROCEDURE — 2700000000 HC OXYGEN THERAPY PER DAY

## 2023-01-31 PROCEDURE — 83735 ASSAY OF MAGNESIUM: CPT

## 2023-01-31 PROCEDURE — 36415 COLL VENOUS BLD VENIPUNCTURE: CPT

## 2023-01-31 PROCEDURE — 94660 CPAP INITIATION&MGMT: CPT

## 2023-01-31 PROCEDURE — 80048 BASIC METABOLIC PNL TOTAL CA: CPT

## 2023-01-31 PROCEDURE — 2580000003 HC RX 258: Performed by: INTERNAL MEDICINE

## 2023-01-31 PROCEDURE — 2500000003 HC RX 250 WO HCPCS: Performed by: INTERNAL MEDICINE

## 2023-01-31 PROCEDURE — 94761 N-INVAS EAR/PLS OXIMETRY MLT: CPT

## 2023-01-31 PROCEDURE — 6370000000 HC RX 637 (ALT 250 FOR IP): Performed by: INTERNAL MEDICINE

## 2023-01-31 RX ADMIN — ISOSORBIDE DINITRATE 20 MG: 20 TABLET ORAL at 20:04

## 2023-01-31 RX ADMIN — CLOPIDOGREL BISULFATE 75 MG: 75 TABLET ORAL at 09:45

## 2023-01-31 RX ADMIN — PANTOPRAZOLE SODIUM 40 MG: 40 TABLET, DELAYED RELEASE ORAL at 09:44

## 2023-01-31 RX ADMIN — CALCIUM ACETATE 667 MG: 667 CAPSULE ORAL at 09:45

## 2023-01-31 RX ADMIN — ISOSORBIDE DINITRATE 20 MG: 20 TABLET ORAL at 09:45

## 2023-01-31 RX ADMIN — DULOXETINE HYDROCHLORIDE 30 MG: 30 CAPSULE, DELAYED RELEASE ORAL at 09:45

## 2023-01-31 RX ADMIN — CALCIUM ACETATE 667 MG: 667 CAPSULE ORAL at 14:01

## 2023-01-31 RX ADMIN — ISOSORBIDE DINITRATE 20 MG: 20 TABLET ORAL at 14:01

## 2023-01-31 RX ADMIN — PRAVASTATIN SODIUM 40 MG: 40 TABLET ORAL at 09:44

## 2023-01-31 RX ADMIN — SODIUM CHLORIDE, PRESERVATIVE FREE 10 ML: 5 INJECTION INTRAVENOUS at 20:04

## 2023-01-31 RX ADMIN — APIXABAN 2.5 MG: 2.5 TABLET, FILM COATED ORAL at 20:04

## 2023-01-31 RX ADMIN — QUETIAPINE FUMARATE 50 MG: 25 TABLET ORAL at 20:04

## 2023-01-31 RX ADMIN — APIXABAN 2.5 MG: 2.5 TABLET, FILM COATED ORAL at 09:45

## 2023-01-31 RX ADMIN — SODIUM CHLORIDE, PRESERVATIVE FREE 10 ML: 5 INJECTION INTRAVENOUS at 09:46

## 2023-01-31 RX ADMIN — MUPIROCIN: 20 OINTMENT TOPICAL at 20:04

## 2023-01-31 RX ADMIN — CALCIUM ACETATE 667 MG: 667 CAPSULE ORAL at 18:24

## 2023-01-31 RX ADMIN — METOPROLOL SUCCINATE 100 MG: 50 TABLET, EXTENDED RELEASE ORAL at 20:04

## 2023-01-31 ASSESSMENT — PAIN DESCRIPTION - ORIENTATION
ORIENTATION: RIGHT;LEFT;LOWER
ORIENTATION: LOWER;RIGHT;LEFT

## 2023-01-31 ASSESSMENT — PAIN DESCRIPTION - LOCATION
LOCATION: BACK;LEG
LOCATION: BACK;LEG

## 2023-01-31 ASSESSMENT — PAIN DESCRIPTION - PAIN TYPE
TYPE: CHRONIC PAIN
TYPE: CHRONIC PAIN

## 2023-01-31 ASSESSMENT — PAIN SCALES - GENERAL
PAINLEVEL_OUTOF10: 7
PAINLEVEL_OUTOF10: 9

## 2023-01-31 ASSESSMENT — PAIN DESCRIPTION - FREQUENCY
FREQUENCY: CONTINUOUS
FREQUENCY: CONTINUOUS

## 2023-01-31 ASSESSMENT — PAIN DESCRIPTION - ONSET
ONSET: ON-GOING
ONSET: ON-GOING

## 2023-01-31 ASSESSMENT — PAIN DESCRIPTION - DESCRIPTORS
DESCRIPTORS: ACHING
DESCRIPTORS: ACHING

## 2023-01-31 NOTE — PROGRESS NOTES
Shift: [id number]    Admitting diagnosis: ARF    Presentation to hospital: SOB; on CPAP via EMS    Surgery:no    Nursing assessment at handoff  stable    Emergency Contact/POA: Melisa Hamm  Family updated: yes @ bedside     Most recent vitals: /69   Pulse 68   Temp 97.8 °F (36.6 °C) (Oral)   Resp 18   Ht 5' 9\" (1.753 m)   Wt 222 lb 10.6 oz (101 kg)   SpO2 100%   BMI 32.88 kg/m²      Rhythm: Normal Sinus Rhythm      NC/HFNC- 2 lpm (Baseline @ 3L home)  Respiratory support: - No ventilator support    Vent days: Day 0      Increased O2 requirements: No    Admission weight Weight: 227 lb (103 kg)  Today's weight   Wt Readings from Last 1 Encounters:   01/30/23 222 lb 10.6 oz (101 kg)         UOP >30ml/hr: no     Wharton need assessed each shift: N/A    Restraints: N/A  Order current and documentation up to date? Lines/Drains  LDA Insertion Date Discontinued Date Dressing Changes   PIV  EJ 1/29     TLC       Arterial       Wharton       Vas Cath 4/26/22     ETT       Surgical drains        Night Shift Hospitalist Interventions    Problem(Brief) Date Time Intervention Physician contacted                                               Drip rates at handoff:    sodium chloride      nitroGLYCERIN 5 mcg/min (01/30/23 2444)       Hospital Course Daily Updates:  Admit Day# 1  -HD @ bedside  -Independent  -Nitro gtt off  -Transfer orders placed    Admit Day#1 Nights  -Independent      Lab Data:   CBC:   Recent Labs     01/29/23 2141 01/30/23  0421   WBC 13.5* 9.8   HGB 9.5* 8.9*   HCT 30.1* 27.3*   MCV 93.5 92.4    313       BMP:    Recent Labs     01/29/23 2141 01/30/23  0421   * 132*   K 4.4 5.3*   CO2 20* 22   BUN 63* 70*   CREATININE 7.8* 8.1*       LIVR:   Recent Labs     01/28/23 2322 01/29/23 2141   AST 11* 12*   ALT 8* 9*       PT/INR:   Recent Labs     01/28/23 2322 01/29/23 2141   PROT 7.8 7.1       APTT: No results for input(s): APTT in the last 72 hours.   ABG: No results for input(s): PHART, NQP7OVF, PO2ART in the last 72 hours.   Consults (if GI or Nephrology- which group?)-  nephrology ([place])

## 2023-01-31 NOTE — PLAN OF CARE
Patient's EF (Ejection Fraction) is less than 40%    Heart Failure Medications:  Diuretics[de-identified] None    (One of the following REQUIRED for EF </= 40%/SYSTOLIC FAILURE but MAY be used in EF% >40%/DIASTOLIC FAILURE)        ACE[de-identified] None        ARB[de-identified] None         ARNI[de-identified] None    (Beta Blockers)  NON- Evidenced Based Beta Blocker (for EF% >40%/DIASTOLIC FAILURE): Metoprolol TARTrate- Lopressor    Evidenced Based Beta Blocker::(REQUIRED for EF% <40%/SYSTOLIC FAILURE) Metoprolol SUCCinate- Toprol XL  . .................................................................................................................................................. Patient's weights and intake/output reviewed: Yes          Intake/Output Summary (Last 24 hours) at 1/31/2023 0686  Last data filed at 1/30/2023 2334  Gross per 24 hour   Intake 440 ml   Output 0 ml   Net 440 ml       Education Booklet Provided: yes    Comorbidities Reviewed Yes    Patient has a past medical history of Ambulatory dysfunction, Aortic stenosis, Arthritis, Asthma, Bilateral hilar adenopathy syndrome, CAD (coronary artery disease), Cardiomyopathy ([address] CHF (congestive heart failure) ([address] Chronic pain, COPD (chronic obstructive pulmonary disease) ([address] Depression, Diabetes mellitus ([address] Difficult intravenous access, Emphysema of lung ([address] ESRD (end stage renal disease) on dialysis ([address] Fear of needles, Gastric ulcer, GERD (gastroesophageal reflux disease), Heart valve problem, Hemodialysis patient ([address] History of spinal fracture, Hx of blood clots, Hyperlipidemia, Hypertension, MI (myocardial infarction) ([address] Neuromuscular disorder ([address] Numbness and tingling in left arm, Pneumonia, PONV (postoperative nausea and vomiting), Prolonged emergence from general anesthesia, Sleep apnea, Stroke ([address] TIA (transient ischemic attack), and Unspecified diseases of blood and blood-forming organs.      >>For CHF and Comorbidity documentation on Education Time and Topics, please see Education Tab    Progressive Mobility Assessment:  What is this patient's Current Level of Mobility?: Ambulatory- with Assistance  How was this patient Mobilized today?:  Up to Toilet/Shower and Up in Room, ambulated 15 ft                 With Whom? Nurse and Self                 Level of Difficulty/Assistance:  independent during day/standby assist at night after meds      Pt resting in bed at this time on CPAP. Pt with complaints of shortness of breath. Pt without lower extremity edema.      Patient and/or Family's stated Goal of Care this Admission: reduce shortness of breath, increase activity tolerance, better understand heart failure and disease management, and be more comfortable prior to discharge        :

## 2023-01-31 NOTE — PROGRESS NOTES
01/30/23 2330   NIV Type   Skin Assessment Clean, dry, & intact   Skin Protection for O2 Device No  (pt refused)   NIV Started/Stopped On   Equipment Type v60   Mode Bilevel   Mask Type Full face mask   Mask Size Medium   Settings/Measurements   IPAP 14 cmH20   CPAP/EPAP 6 cmH2O   Vt (Measured) 409 mL   Rate Ordered 8   Resp 24   FiO2  40 %   Minute Volume (L/min) 11.3 Liters   Mask Leak (lpm) 72 lpm   Comfort Level Good   Using Accessory Muscles No   SpO2 100   Alarm Settings   Alarms On Y   Low Pressure (cmH2O) 6 cmH2O   High Pressure (cmH2O) 30 cmH2O   Apnea (secs) 20 secs   RR Low (bpm) 6   RR High (bpm) 45 br/min   Oxygen Therapy/Pulse Ox   Heart Rate 68   SpO2 100 %

## 2023-01-31 NOTE — PROGRESS NOTES
01/31/23 0356   NIV Type   Equipment Type v60   Mode Bilevel   Mask Type Full face mask   Mask Size Medium   Settings/Measurements   IPAP 14 cmH20   CPAP/EPAP 6 cmH2O   Vt (Measured) 457 mL   Rate Ordered 8   Resp 12   FiO2  30 %  (decreased from 40%)   Minute Volume (L/min) 5.2 Liters   Mask Leak (lpm) 83 lpm   Comfort Level Good   Using Accessory Muscles No   SpO2 100   Patient's Home Machine No   Alarm Settings   Alarms On Y   Low Pressure (cmH2O) 6 cmH2O   High Pressure (cmH2O) 30 cmH2O   Delay Alarm 20 sec(s)   RR Low (bpm) 6   RR High (bpm) 45 br/min   Oxygen Therapy/Pulse Ox   Heart Rate 57   SpO2 100 %

## 2023-01-31 NOTE — PROGRESS NOTES
[facility] [employer]  Nephrology Follow up Note           Reason for Consult: ESRD  Requesting Physician:  Dr. Shira Ibrahim history  Pt was resting on bed w BIPAP on during my rounds this am    HD on 1/30 w 2.5l UF over 2h as the system clotted twice during HD     ROS: No fever  PSFH: No visitor    Scheduled Meds:   apixaban  2.5 mg Oral BID    calcium acetate  1 capsule Oral TID WC    clopidogrel  75 mg Oral Daily    DULoxetine  30 mg Oral Daily    isosorbide dinitrate  20 mg Oral TID    metoprolol succinate  100 mg Oral BID    pantoprazole  40 mg Oral QAM AC    pravastatin  40 mg Oral Daily    QUEtiapine  50 mg Oral Nightly    sodium chloride flush  5-40 mL IntraVENous 2 times per day    mupirocin   Nasal BID    epoetin siddharth-epbx  10,000 Units IntraVENous Q MWF    calcitRIOL  1.25 mcg Oral Q MWF     Continuous Infusions:   sodium chloride      nitroGLYCERIN 5 mcg/min (01/30/23 0457)     PRN Meds:.calcium carbonate, gabapentin, sodium chloride flush, sodium chloride, ondansetron **OR** ondansetron, polyethylene glycol, acetaminophen **OR** acetaminophen, ipratropium-albuterol, sennosides-docusate sodium, docusate sodium, loperamide    History of Present Illness on 1/30/2023:    45 y.o. yo male with PMH of ESRD, CHF on baseline home oxygen at 3 L, s/p TAVR, CAD, HTN, DM2, asthma-COPD who is admitted for increase shortness of breath  Patient reports that he has been feeling short of breath since Friday. He completed his dialysis for still felt short of breath. He was needing up to 5 to 6 L of oxygen at home. He presented to the emergency room with increasing shortness of breath initially requiring BiPAP with 3 L of oxygen and has been admitted to ICU.     Physical exam:   Constitutional:  VITALS:  /64   Pulse 61   Temp 98 °F (36.7 °C) (Oral)   Resp 17   Ht 5' 9\" (1.753 m)   Wt 222 lb 10.6 oz (101 kg)   SpO2 100%   BMI 32.88 kg/m²   Gen: alert, awake  Neck: No JVD  Skin: Unremarkable  Cardiovascular:  S1, S2 without m/r/g   Respiratory: CTA B without w/r/r; respiratory effort normal  Abdomen:  soft, nt, nd,   Extremities: no lower extremity edema  Neuro/Psy: AAoriented times 3 ; moves all 4 ext    Data/  Recent Labs     01/29/23 2141 01/30/23 0421 01/31/23 0423   WBC 13.5* 9.8 8.4   HGB 9.5* 8.9* 7.6*   HCT 30.1* 27.3* 23.8*   MCV 93.5 92.4 93.1    313 290       Recent Labs     01/29/23 2141 01/30/23 0421 01/31/23 0423   * 132* 129*   K 4.4 5.3* 4.3   CL 90* 94* 92*   CO2 20* 22 23   GLUCOSE 180* 258* 236*   MG  --   --  2.20   BUN 63* 70* 70*   CREATININE 7.8* 8.1* 7.5*   LABGLOM 8* 7* 8*         Assessment  -ESRD on HD Monday Wednesday Friday  -Acute on chronic respiratory failure  -Hypertensive urgency on nitro drip, has been weaned off this morning during my rounds  -CKD/MBD  -Hyperkalemia  -Anemia    Plan  -HD [facility] per [facility] schedule, next on 2/1   Target weight 99.5 kg   With challenge as tolerated, anticipate better blood pressure control with fluid removal  -DAVON 10K units as ordered  -Calcitriol 1.25 mcg Monday Wednesday Friday  -serial labs  -renal diet    Thank you for the consultation. Please do not hesitate to call with questions. Gary Ruvalcaba MD  Office: [phone]  Fax:    [phone] [address] [facility]

## 2023-01-31 NOTE — PROGRESS NOTES
Comprehensive Nutrition Assessment    Type and Reason for Visit:  Initial, Consult, Patient Education    Nutrition Recommendations/Plan:   Continue renal diet regimen  Offer and encourage Nepro bid   Encourage food sources of protein each meal  Will monitor nutritional adequacy, nutrition-related labs, weights, BMs, and clinical progress      Malnutrition Assessment:  Malnutrition Status:  No malnutrition (01/31/23 1122)      Nutrition Assessment:    Patient admitted to ICU with acute respiratory failure with hypoxemia. Currently on 2 liters, bipap overnight. HX of ESRD, CHF, COPD. This RD familar with patient, spoke with twice last admission (early December 2022). Appetite was very good and familiar with diet recommendations last admission. Will offer Nepro bid due to increased nutrient needs and monitor tolerance. Will monitor need for further diet education when medically appropriate and nutritional progress. Nutrition Related Findings:    Na 129 mmol/L this am; diarrhea noted earlier this am Wound Type: None       Current Nutrition Intake & Therapies:    Average Meal Intake: Unable to assess  Average Supplements Intake: Unable to assess  ADULT DIET; Regular; Low Sodium (2 gm); Low Potassium (Less than 3000 mg/day); Low Phosphorus (Less than 1000 mg); 1200 ml  ADULT ORAL NUTRITION SUPPLEMENT; Breakfast, Dinner; Renal Oral Supplement    Anthropometric Measures:  Height: 5' 9\" (175.3 cm)  Ideal Body Weight (IBW): 160 lbs (73 kg)    Admission Body Weight: 228 lb 3 oz (103.5 kg)  Current Body Weight: 222 lb 10 oz (101 kg),   IBW. Weight Source: Bed Scale  Current BMI (kg/m2): 32.9                          BMI Categories: Obese Class 1 (BMI 30.0-34. 9)    Estimated Daily Nutrient Needs:  Energy Requirements Based On: Kcal/kg  Weight Used for Energy Requirements: Ideal  Energy (kcal/day): 4845-7676 (25-30 kcal/72.7 kg)  Weight Used for Protein Requirements: Ideal  Protein (g/day):  (1.2-1.5 g/75.5 kg)  Method Used for Fluid Requirements: Standard Renal  Fluid (ml/day):      Nutrition Diagnosis:   Increased nutrient needs related to increase demand for energy/nutrients, impaired respiratory function as evidenced by dialysis, BMI    Nutrition Interventions:   Food and/or Nutrient Delivery: Continue Current Diet, Start Oral Nutrition Supplement  Nutrition Education/Counseling:  (monitor need when medically appropriate)  Coordination of Nutrition Care: Continue to monitor while inpatient       Goals:     Goals: PO intake 50% or greater       Nutrition Monitoring and Evaluation:      Food/Nutrient Intake Outcomes: Food and Nutrient Intake, Supplement Intake  Physical Signs/Symptoms Outcomes: Chewing or Swallowing, Nutrition Focused Physical Findings, Biochemical Data, Fluid Status or Edema    Discharge Planning:     Too soon to determine     CARMEN, [address] [facility], [address] [place] [address],   Contact: [phone]

## 2023-01-31 NOTE — PROGRESS NOTES
01/31/23 0844   NIV Type   NIV Started/Stopped On   Equipment Type v60   Mode Bilevel   Mask Type Full face mask   Mask Size Medium   Settings/Measurements   PIP Observed 16 cm H20   IPAP 14 cmH20   CPAP/EPAP 6 cmH2O   Vt (Measured) 364 mL   Rate Ordered 8   Resp 17   FiO2  30 %   I Time/ I Time % 1 s   Minute Volume (L/min) 5.9 Liters   Mask Leak (lpm) 96 lpm   Comfort Level Good   Using Accessory Muscles No   SpO2 100   Patient's Home Machine No   Alarm Settings   Alarms On Y   Low Pressure (cmH2O) 6 cmH2O   High Pressure (cmH2O) 30 cmH2O   Apnea (secs) 20 secs   RR Low (bpm) 6   RR High (bpm) 45 br/min   Oxygen Therapy/Pulse Ox   O2 Therapy Oxygen   $Oxygen $Daily Charge   O2 Device PAP (positive airway pressure)   Heart Rate 52   SpO2 100 %   $Pulse Oximeter $Spot check (multiple/continuous)

## 2023-01-31 NOTE — PROGRESS NOTES
Hospitalist Progress Note      PCP: Pcp No    Date of Admission: 1/29/2023    Chief Complaint: SOB    Subjective: no new c/o. Medications:  Reviewed    Infusion Medications    sodium chloride      nitroGLYCERIN 5 mcg/min (01/30/23 5567)     Scheduled Medications    apixaban  2.5 mg Oral BID    calcium acetate  1 capsule Oral TID WC    clopidogrel  75 mg Oral Daily    DULoxetine  30 mg Oral Daily    isosorbide dinitrate  20 mg Oral TID    metoprolol succinate  100 mg Oral BID    pantoprazole  40 mg Oral QAM AC    pravastatin  40 mg Oral Daily    QUEtiapine  50 mg Oral Nightly    sodium chloride flush  5-40 mL IntraVENous 2 times per day    mupirocin   Nasal BID    epoetin siddharth-epbx  10,000 Units IntraVENous Q MWF    calcitRIOL  1.25 mcg Oral Q MWF     PRN Meds: calcium carbonate, gabapentin, sodium chloride flush, sodium chloride, ondansetron **OR** ondansetron, polyethylene glycol, acetaminophen **OR** acetaminophen, ipratropium-albuterol, sennosides-docusate sodium, docusate sodium, loperamide      Intake/Output Summary (Last 24 hours) at 1/31/2023 1007  Last data filed at 1/30/2023 2334  Gross per 24 hour   Intake 440 ml   Output --   Net 440 ml       Physical Exam Performed:    BP (!) 100/59   Pulse 52   Temp 97.8 °F (36.6 °C) (Oral)   Resp 17   Ht 5' 9\" (1.753 m)   Wt 222 lb 10.6 oz (101 kg)   SpO2 100%   BMI 32.88 kg/m²     General appearance: No apparent distress, appears stated age and cooperative. HEENT: Pupils equal, round, and reactive to light. Conjunctivae/corneas clear. Neck: Supple, with full range of motion. No jugular venous distention. Trachea midline. Respiratory:  Normal respiratory effort. Clear to auscultation, bilaterally without Rales/Wheezes/Rhonchi. Cardiovascular: Regular rate and rhythm with normal S1/S2 without murmurs, rubs or gallops. Abdomen: Soft, non-tender, non-distended with normal bowel sounds. Musculoskeletal: No clubbing, cyanosis or edema bilaterally. Full range of motion without deformity. Skin: Skin color, texture, turgor normal.  No rashes or lesions. Neurologic:  Neurovascularly intact without any focal sensory/motor deficits. Cranial nerves: II-XII intact, grossly non-focal.  Psychiatric: Alert and oriented, thought content appropriate, normal insight  Capillary Refill: Brisk,< 3 seconds   Peripheral Pulses: +2 palpable, equal bilaterally       Labs:   Recent Labs     01/29/23 2141 01/30/23 0421 01/31/23 0423   WBC 13.5* 9.8 8.4   HGB 9.5* 8.9* 7.6*   HCT 30.1* 27.3* 23.8*    313 290     Recent Labs     01/29/23 2141 01/30/23 0421 01/31/23 0423   * 132* 129*   K 4.4 5.3* 4.3   CL 90* 94* 92*   CO2 20* 22 23   BUN 63* 70* 70*   CREATININE 7.8* 8.1* 7.5*   CALCIUM 9.1 8.5 8.2*     Recent Labs     01/28/23 2322 01/29/23 2141   AST 11* 12*   ALT 8* 9*   BILITOT <0.2 <0.2   ALKPHOS 73 70     No results for input(s): INR in the last 72 hours.   Recent Labs     01/29/23 2141 01/30/23 0038 01/30/23 0421   TROPONINI 0.16* 0.16* 0.16*       Urinalysis:      Lab Results   Component Value Date/Time    NITRU Negative 12/25/2022 05:00 AM    WBCUA 6-9 12/25/2022 05:00 AM    BACTERIA Rare 12/25/2022 05:00 AM    RBCUA 3-4 12/25/2022 05:00 AM    BLOODU SMALL 12/25/2022 05:00 AM    SPECGRAV 1.020 12/25/2022 05:00 AM    GLUCOSEU 500 12/25/2022 05:00 AM       Consults:    IP CONSULT TO HOSPITALIST  IP CONSULT TO NEPHROLOGY  IP CONSULT TO HEART FAILURE NURSE/COORDINATOR  IP CONSULT TO DIETITIAN      Assessment/Plan:    Active Hospital Problems    Diagnosis     Ischemic cardiomyopathy [I25.5]      Priority: High    Dyslipidemia [E78.5]      Priority: High    DM2 (diabetes mellitus, type 2) ([address] [E11.9]      Priority: High    Hypertensive urgency [I16.0]      Priority: Medium    Acute respiratory failure with hypoxemia (HCC) [J96.01]      Priority: Medium    HFrEF (heart failure with reduced ejection fraction) ([facility] [I50.20]      Priority: Medium Asthma-COPD overlap syndrome ([facility] [J44.9]      Priority: Medium    PAF (paroxysmal atrial fibrillation) ([facility]) [I48.0]     ESRD (end stage renal disease) ([facility] [N18.6]     ZAINAB on CPAP [G47.33, Z99.89]        Acute on chronic, respiratory failure with hypoxemia  Etiology is obscure, clinically he could have been in COPD exacerbation with reported wheezing on the field, however when I examined him his lungs are clear  -Spoke with ED physician and discussed treatment plan  -BiPAP initiated by squad, continued and ER and ICU. After a few hours we were able to wean him off to nasal cannula oxygen. Currently on 3 LPM  -CT did not show any significant pulmonary edema or pleural effusions  -Continue duo nebs every 4 hours  -We will transfer to [facility] bed     Hypertensive urgency  -Started on nitro drip in the ED  -We will try to wean off nitro drip and transfer to [facility] bed  -Continue metoprolol, Isordil     ESRD on HD-nephrology consulted. Patient reports compliance with HD treatment and did not miss last Friday     Troponin elevation-chronic elevation due to ESRD status. No ischemic changes on EKG. No anginal symptoms     CAD/chronic systolic CHF EF 20 to 11%  -Stable, continue Plavix, Isordil, metoprolol and statin  -Patient denied any anginal symptoms     Paroxysmal A. fib-continue metoprolol and Eliquis, currently in sinus rhythm     Obesity -  With Body mass index is 33.52 kg/m². Complicating assessment and treatment. Placing patient at risk for multiple co-morbidities as well as early death and contributing to the patient's presentation. Counseled on weight loss. ZAINAB - likely 2nd to obesity. Controlled on home CPAP/BiPap - continued, w/ outpatient f/u as previously arranged. DVT Prophylaxis: Eliquis/IPC       Recent Labs     01/29/23  2141 01/30/23  0421 01/31/23  0423    313 290     Diet: ADULT DIET; Regular; Low Sodium (2 gm); Low Potassium (Less than 3000 mg/day);  Low Phosphorus (Less than 1000 mg); 1200 ml  Code Status: Full Code       PT/OT Eval Status: not yet ordered. Dispo - Remains in ICU. Patient is likely to remain in-house at least for the foreseeable future pending clinical course and subspecialty recs.        Elizabet Leal MD

## 2023-01-31 NOTE — PLAN OF CARE
Problem: Discharge Planning  Goal: Discharge to home or other facility with appropriate resources  Outcome: Progressing  Flowsheets (Taken 1/30/2023 2000)  Discharge to home or other facility with appropriate resources:   Identify barriers to discharge with patient and caregiver   Arrange for interpreters to assist at discharge as needed   Arrange for needed discharge resources and transportation as appropriate   Identify discharge learning needs (meds, wound care, etc)   Refer to discharge planning if patient needs post-hospital services based on physician order or complex needs related to functional status, cognitive ability or social support system     Problem: Pain  Goal: Verbalizes/displays adequate comfort level or baseline comfort level  1/30/2023 2125 by Ariana Brooks RN  Outcome: Progressing  Flowsheets (Taken 1/30/2023 2000)  Verbalizes/displays adequate comfort level or baseline comfort level:   Encourage patient to monitor pain and request assistance   Assess pain using appropriate pain scale   Administer analgesics based on type and severity of pain and evaluate response   Implement non-pharmacological measures as appropriate and evaluate response   Consider cultural and social influences on pain and pain management   Notify Licensed Independent Practitioner if interventions unsuccessful or patient reports new pain     Problem: Chronic Conditions and Co-morbidities  Goal: Patient's chronic conditions and co-morbidity symptoms are monitored and maintained or improved  1/30/2023 2125 by Ariana Brooks RN  Outcome: Progressing  Flowsheets (Taken 1/30/2023 2000)  Care Plan - Patient's Chronic Conditions and Co-Morbidity Symptoms are Monitored and Maintained or Improved:   Monitor and assess patient's chronic conditions and comorbid symptoms for stability, deterioration, or improvement   Collaborate with multidisciplinary team to address chronic and comorbid conditions and prevent exacerbation or deterioration   Update acute care plan with appropriate goals if chronic or comorbid symptoms are exacerbated and prevent overall improvement and discharge

## 2023-02-01 ENCOUNTER — APPOINTMENT (OUTPATIENT)
Dept: INTERVENTIONAL RADIOLOGY/VASCULAR | Age: 53
DRG: 640 | End: 2023-02-01
Payer: MEDICARE

## 2023-02-01 LAB
ALBUMIN SERPL-MCNC: 3.6 G/DL (ref 3.4–5)
ANION GAP SERPL CALCULATED.3IONS-SCNC: 19 MMOL/L (ref 3–16)
BUN BLDV-MCNC: 84 MG/DL (ref 7–20)
CALCIUM SERPL-MCNC: 8.2 MG/DL (ref 8.3–10.6)
CHLORIDE BLD-SCNC: 90 MMOL/L (ref 99–110)
CO2: 20 MMOL/L (ref 21–32)
CREAT SERPL-MCNC: 8.7 MG/DL (ref 0.9–1.3)
EKG ATRIAL RATE: 110 BPM
EKG DIAGNOSIS: NORMAL
EKG P AXIS: 50 DEGREES
EKG P-R INTERVAL: 170 MS
EKG Q-T INTERVAL: 392 MS
EKG QRS DURATION: 92 MS
EKG QTC CALCULATION (BAZETT): 530 MS
EKG R AXIS: 32 DEGREES
EKG T AXIS: 78 DEGREES
EKG VENTRICULAR RATE: 110 BPM
GFR SERPL CREATININE-BSD FRML MDRD: 7 ML/MIN/{1.73_M2}
GLUCOSE BLD-MCNC: 122 MG/DL (ref 70–99)
GLUCOSE BLD-MCNC: 169 MG/DL (ref 70–99)
GLUCOSE BLD-MCNC: 257 MG/DL (ref 70–99)
HCT VFR BLD CALC: 25.6 % (ref 40.5–52.5)
HEMOGLOBIN: 8.5 G/DL (ref 13.5–17.5)
INR BLD: 1.15 (ref 0.87–1.14)
MAGNESIUM: 2.3 MG/DL (ref 1.8–2.4)
MCH RBC QN AUTO: 31.3 PG (ref 26–34)
MCHC RBC AUTO-ENTMCNC: 33.4 G/DL (ref 31–36)
MCV RBC AUTO: 93.9 FL (ref 80–100)
PDW BLD-RTO: 16.5 % (ref 12.4–15.4)
PERFORMED ON: ABNORMAL
PERFORMED ON: ABNORMAL
PHOSPHORUS: 8.5 MG/DL (ref 2.5–4.9)
PLATELET # BLD: 309 K/UL (ref 135–450)
PMV BLD AUTO: 7.6 FL (ref 5–10.5)
POTASSIUM SERPL-SCNC: 4.9 MMOL/L (ref 3.5–5.1)
PROTHROMBIN TIME: 14.6 SEC (ref 11.7–14.5)
RBC # BLD: 2.72 M/UL (ref 4.2–5.9)
SODIUM BLD-SCNC: 129 MMOL/L (ref 136–145)
WBC # BLD: 8.7 K/UL (ref 4–11)

## 2023-02-01 PROCEDURE — 36581 REPLACE TUNNELED CV CATH: CPT

## 2023-02-01 PROCEDURE — 94660 CPAP INITIATION&MGMT: CPT

## 2023-02-01 PROCEDURE — 6360000004 HC RX CONTRAST MEDICATION: Performed by: RADIOLOGY

## 2023-02-01 PROCEDURE — 2580000003 HC RX 258: Performed by: INTERNAL MEDICINE

## 2023-02-01 PROCEDURE — 94761 N-INVAS EAR/PLS OXIMETRY MLT: CPT

## 2023-02-01 PROCEDURE — 77001 FLUOROGUIDE FOR VEIN DEVICE: CPT

## 2023-02-01 PROCEDURE — 80069 RENAL FUNCTION PANEL: CPT

## 2023-02-01 PROCEDURE — 2700000000 HC OXYGEN THERAPY PER DAY

## 2023-02-01 PROCEDURE — C1750 CATH, HEMODIALYSIS,LONG-TERM: HCPCS

## 2023-02-01 PROCEDURE — 83735 ASSAY OF MAGNESIUM: CPT

## 2023-02-01 PROCEDURE — 6360000002 HC RX W HCPCS

## 2023-02-01 PROCEDURE — 6370000000 HC RX 637 (ALT 250 FOR IP): Performed by: INTERNAL MEDICINE

## 2023-02-01 PROCEDURE — 36415 COLL VENOUS BLD VENIPUNCTURE: CPT

## 2023-02-01 PROCEDURE — 90935 HEMODIALYSIS ONE EVALUATION: CPT

## 2023-02-01 PROCEDURE — 93010 ELECTROCARDIOGRAM REPORT: CPT | Performed by: INTERNAL MEDICINE

## 2023-02-01 PROCEDURE — 6360000002 HC RX W HCPCS: Performed by: INTERNAL MEDICINE

## 2023-02-01 PROCEDURE — 2500000003 HC RX 250 WO HCPCS: Performed by: INTERNAL MEDICINE

## 2023-02-01 PROCEDURE — 2060000000 HC ICU INTERMEDIATE R&B

## 2023-02-01 PROCEDURE — 85027 COMPLETE CBC AUTOMATED: CPT

## 2023-02-01 PROCEDURE — 85610 PROTHROMBIN TIME: CPT

## 2023-02-01 PROCEDURE — 02HV33Z INSERTION OF INFUSION DEVICE INTO SUPERIOR VENA CAVA, PERCUTANEOUS APPROACH: ICD-10-PCS | Performed by: INTERNAL MEDICINE

## 2023-02-01 RX ORDER — HEPARIN SODIUM 1000 [USP'U]/ML
INJECTION, SOLUTION INTRAVENOUS; SUBCUTANEOUS
Status: COMPLETED
Start: 2023-02-01 | End: 2023-02-02

## 2023-02-01 RX ORDER — HEPARIN SODIUM 1000 [USP'U]/ML
3800 INJECTION, SOLUTION INTRAVENOUS; SUBCUTANEOUS PRN
Status: DISCONTINUED | OUTPATIENT
Start: 2023-02-01 | End: 2023-02-06 | Stop reason: HOSPADM

## 2023-02-01 RX ORDER — HEPARIN SODIUM 1000 [USP'U]/ML
INJECTION, SOLUTION INTRAVENOUS; SUBCUTANEOUS
Status: DISCONTINUED
Start: 2023-02-01 | End: 2023-02-01 | Stop reason: WASHOUT

## 2023-02-01 RX ADMIN — QUETIAPINE FUMARATE 50 MG: 25 TABLET ORAL at 21:21

## 2023-02-01 RX ADMIN — PANTOPRAZOLE SODIUM 40 MG: 40 TABLET, DELAYED RELEASE ORAL at 04:34

## 2023-02-01 RX ADMIN — ISOSORBIDE DINITRATE 20 MG: 20 TABLET ORAL at 14:53

## 2023-02-01 RX ADMIN — IOVERSOL 10 ML: 741 INJECTION INTRA-ARTERIAL; INTRAVENOUS at 15:45

## 2023-02-01 RX ADMIN — SODIUM CHLORIDE, PRESERVATIVE FREE 10 ML: 5 INJECTION INTRAVENOUS at 23:00

## 2023-02-01 RX ADMIN — APIXABAN 2.5 MG: 2.5 TABLET, FILM COATED ORAL at 21:20

## 2023-02-01 RX ADMIN — EPOETIN ALFA-EPBX 10000 UNITS: 10000 INJECTION, SOLUTION INTRAVENOUS; SUBCUTANEOUS at 19:24

## 2023-02-01 RX ADMIN — PRAVASTATIN SODIUM 40 MG: 40 TABLET ORAL at 08:32

## 2023-02-01 RX ADMIN — ISOSORBIDE DINITRATE 20 MG: 20 TABLET ORAL at 08:32

## 2023-02-01 RX ADMIN — APIXABAN 2.5 MG: 2.5 TABLET, FILM COATED ORAL at 08:32

## 2023-02-01 RX ADMIN — CALCIUM ACETATE 667 MG: 667 CAPSULE ORAL at 08:32

## 2023-02-01 RX ADMIN — CLOPIDOGREL BISULFATE 75 MG: 75 TABLET ORAL at 08:32

## 2023-02-01 RX ADMIN — HEPARIN SODIUM 3800 UNITS: 1000 INJECTION, SOLUTION INTRAVENOUS; SUBCUTANEOUS at 17:00

## 2023-02-01 RX ADMIN — HEPARIN SODIUM 3800 UNITS: 1000 INJECTION INTRAVENOUS; SUBCUTANEOUS at 17:00

## 2023-02-01 RX ADMIN — METOPROLOL SUCCINATE 100 MG: 50 TABLET, EXTENDED RELEASE ORAL at 21:20

## 2023-02-01 RX ADMIN — ALTEPLASE 4 MG: 2.2 INJECTION, POWDER, LYOPHILIZED, FOR SOLUTION INTRAVENOUS at 12:27

## 2023-02-01 RX ADMIN — DULOXETINE HYDROCHLORIDE 30 MG: 30 CAPSULE, DELAYED RELEASE ORAL at 08:31

## 2023-02-01 RX ADMIN — SODIUM CHLORIDE, PRESERVATIVE FREE 10 ML: 5 INJECTION INTRAVENOUS at 08:34

## 2023-02-01 RX ADMIN — ISOSORBIDE DINITRATE 20 MG: 20 TABLET ORAL at 21:20

## 2023-02-01 ASSESSMENT — PAIN SCALES - GENERAL
PAINLEVEL_OUTOF10: 0
PAINLEVEL_OUTOF10: 8
PAINLEVEL_OUTOF10: 0

## 2023-02-01 ASSESSMENT — PAIN DESCRIPTION - DESCRIPTORS: DESCRIPTORS: ACHING;DISCOMFORT

## 2023-02-01 ASSESSMENT — PAIN - FUNCTIONAL ASSESSMENT: PAIN_FUNCTIONAL_ASSESSMENT: PREVENTS OR INTERFERES SOME ACTIVE ACTIVITIES AND ADLS

## 2023-02-01 ASSESSMENT — PAIN DESCRIPTION - ORIENTATION: ORIENTATION: LOWER

## 2023-02-01 ASSESSMENT — PAIN DESCRIPTION - PAIN TYPE: TYPE: CHRONIC PAIN

## 2023-02-01 ASSESSMENT — PAIN DESCRIPTION - LOCATION: LOCATION: BACK

## 2023-02-01 NOTE — PROGRESS NOTES
02/01/23 1419   NIV Type   Skin Protection for O2 Device No  (Pt refused mepilex)   NIV Started/Stopped (S)  On   Equipment Type v60   Mode Bilevel   Mask Type Full face mask   Mask Size Medium   Settings/Measurements   PIP Observed 14 cm H20   IPAP 14 cmH20   CPAP/EPAP 6 cmH2O   Vt (Measured) 759 mL   Rate Ordered 8   Resp 20   FiO2  30 %   I Time/ I Time % 1 s   Minute Volume (L/min) 15.2 Liters   Mask Leak (lpm) 65 lpm   Comfort Level Good   Using Accessory Muscles Yes   SpO2 94   Patient's Home Machine No   Alarm Settings   Alarms On Y   Low Pressure (cmH2O) 6 cmH2O   High Pressure (cmH2O) 30 cmH2O   Apnea (secs) 20 secs   RR Low (bpm) 6   RR High (bpm) 45 br/min   Patient Observation   Observations Pt requested to be on bipap.    Oxygen Therapy/Pulse Ox   O2 Therapy Oxygen   O2 Device PAP (positive airway pressure)   Heart Rate 66   SpO2 94 %

## 2023-02-01 NOTE — PLAN OF CARE
Patient's EF (Ejection Fraction) is less than 40%    Heart Failure Medications:  Diuretics[de-identified] None    (One of the following REQUIRED for EF </= 40%/SYSTOLIC FAILURE but MAY be used in EF% >40%/DIASTOLIC FAILURE)        ACE[de-identified] None        ARB[de-identified] None         ARNI[de-identified] None    (Beta Blockers)  NON- Evidenced Based Beta Blocker (for EF% >40%/DIASTOLIC FAILURE): Metoprolol TARTrate- Lopressor    Evidenced Based Beta Blocker::(REQUIRED for EF% <40%/SYSTOLIC FAILURE) Metoprolol SUCCinate- Toprol XL  . .................................................................................................................................................. Patient's weights and intake/output reviewed: Yes          Intake/Output Summary (Last 24 hours) at 1/31/2023 1921  Last data filed at 1/30/2023 2334  Gross per 24 hour   Intake 440 ml   Output --   Net 440 ml         Education Booklet Provided: yes    Comorbidities Reviewed Yes    Patient has a past medical history of Ambulatory dysfunction, Aortic stenosis, Arthritis, Asthma, Bilateral hilar adenopathy syndrome, CAD (coronary artery disease), Cardiomyopathy ([address] CHF (congestive heart failure) ([address] Chronic pain, COPD (chronic obstructive pulmonary disease) ([address] Depression, Diabetes mellitus ([address] Difficult intravenous access, Emphysema of lung ([address] ESRD (end stage renal disease) on dialysis ([address] Fear of needles, Gastric ulcer, GERD (gastroesophageal reflux disease), Heart valve problem, Hemodialysis patient ([address] History of spinal fracture, Hx of blood clots, Hyperlipidemia, Hypertension, MI (myocardial infarction) ([address] Neuromuscular disorder ([address] Numbness and tingling in left arm, Pneumonia, PONV (postoperative nausea and vomiting), Prolonged emergence from general anesthesia, Sleep apnea, Stroke ([address] TIA (transient ischemic attack), and Unspecified diseases of blood and blood-forming organs.      >>For CHF and Comorbidity documentation on Education Time and Topics, please see Education Tab    Progressive Mobility Assessment:  What is this patient's Current Level of Mobility?: Ambulatory- with Assistance  How was this patient Mobilized today?:  Up to Toilet/Shower and Up in Room, ambulated 15 ft                 With Whom? Nurse and Self                 Level of Difficulty/Assistance:  independent during day/standby assist at night after meds      Pt resting in bed at this time on CPAP. Pt with complaints of shortness of breath. Pt without lower extremity edema.      Patient and/or Family's stated Goal of Care this Admission: reduce shortness of breath, increase activity tolerance, better understand heart failure and disease management, and be more comfortable prior to discharge        :

## 2023-02-01 NOTE — PLAN OF CARE
Problem: Discharge Planning  Goal: Discharge to home or other facility with appropriate resources  Outcome: Progressing  Flowsheets (Taken 1/31/2023 2045 by Alejandra Flores, RN)  Discharge to home or other facility with appropriate resources: Identify barriers to discharge with patient and caregiver     Problem: Pain  Goal: Verbalizes/displays adequate comfort level or baseline comfort level  Outcome: Progressing  Flowsheets (Taken 1/31/2023 2045 by Alejandra Flores, RN)  Verbalizes/displays adequate comfort level or baseline comfort level:   Encourage patient to monitor pain and request assistance   Assess pain using appropriate pain scale   Administer analgesics based on type and severity of pain and evaluate response   Implement non-pharmacological measures as appropriate and evaluate response     Problem: Chronic Conditions and Co-morbidities  Goal: Patient's chronic conditions and co-morbidity symptoms are monitored and maintained or improved  Outcome: Progressing  Flowsheets (Taken 1/31/2023 2045 by Alejandra Flores, RN)  Care Plan - Patient's Chronic Conditions and Co-Morbidity Symptoms are Monitored and Maintained or Improved: Monitor and assess patient's chronic conditions and comorbid symptoms for stability, deterioration, or improvement     Problem: Nutrition Deficit:  Goal: Optimize nutritional status  Outcome: Progressing

## 2023-02-01 NOTE — PROGRESS NOTES
Pt didn't have HD today per dysfunctional dialysis catheter. Blue port good withdraw back and flushed well but once connected to machine art pressure dropped immediately. Cath cathi dwelled for 1hrs then resumed tx but still same issue. Informed Dr Ralf Muir and pt sent back to room.

## 2023-02-01 NOTE — PROGRESS NOTES
01/31/23 2332   NIV Type   Skin Assessment Clean, dry, & intact   Skin Protection for O2 Device No  (Patient stated he did not want mepilex for nose)   Equipment Type V60   Settings/Measurements   PIP Observed 14 cm H20   IPAP 14 cmH20   CPAP/EPAP 6 cmH2O   Vt (Measured) 883 mL   Rate Ordered 8   Resp 15   FiO2  30 %   I Time/ I Time % 1 s   Minute Volume (L/min) 11.2 Liters   Mask Leak (lpm) 42 lpm   Comfort Level Good   Using Accessory Muscles No   SpO2 98   Alarm Settings   Alarms On Y   Low Pressure (cmH2O) 6 cmH2O   High Pressure (cmH2O) 30 cmH2O   Delay Alarm 20 sec(s)   Apnea (secs) 20 secs   RR Low (bpm) 6   RR High (bpm) 45 br/min   Oxygen Therapy/Pulse Ox   Heart Rate 67   SpO2 98 %

## 2023-02-01 NOTE — OR NURSING
Image guided tunneled dialysis catheter exchange left IJ completed. Dr. Amor Spence placed 14.5 [place] 23 cm [employer] tunneled dialysis catheter LOT # [id number] 12/31/2023 in the left IJ. Drain/tube secured  SUTURES. Drain/tube dressing clean, dry, and intact. Pt tolerated procedure without any signs or symptoms of distress. Vital signs stable. Report called to  RN. Pt transported back to [facility] in stable condition via bed by transport. Vital Signs  Vitals:    02/01/23 1545   BP:    Pulse: 66   Resp: 22   Temp:    SpO2: 100%    (vital signs in table format)    This RN wasted the following with SANJAY MORFIN.  100 mcg Fentanyl    No sedation was given for procedure.

## 2023-02-01 NOTE — PROGRESS NOTES
Hospitalist Progress Note      PCP: Pcp No    Date of Admission: 1/29/2023    Chief Complaint: SOB    Subjective: no new c/o. Seen in HD      Medications:  Reviewed    Infusion Medications    sodium chloride      nitroGLYCERIN 5 mcg/min (01/30/23 0217)     Scheduled Medications    apixaban  2.5 mg Oral BID    calcium acetate  1 capsule Oral TID WC    clopidogrel  75 mg Oral Daily    DULoxetine  30 mg Oral Daily    isosorbide dinitrate  20 mg Oral TID    metoprolol succinate  100 mg Oral BID    pantoprazole  40 mg Oral QAM AC    pravastatin  40 mg Oral Daily    QUEtiapine  50 mg Oral Nightly    sodium chloride flush  5-40 mL IntraVENous 2 times per day    mupirocin   Nasal BID    epoetin siddharth-epbx  10,000 Units IntraVENous Q MWF    calcitRIOL  1.25 mcg Oral Q MWF     PRN Meds: calcium carbonate, gabapentin, sodium chloride flush, sodium chloride, ondansetron **OR** ondansetron, polyethylene glycol, acetaminophen **OR** acetaminophen, ipratropium-albuterol, sennosides-docusate sodium, docusate sodium, loperamide    No intake or output data in the 24 hours ending 02/01/23 0908      Physical Exam Performed:    BP (!) 111/59   Pulse 53   Temp 97.3 °F (36.3 °C) (Oral)   Resp 15   Ht 5' 9\" (1.753 m)   Wt 234 lb 3.2 oz (106.2 kg)   SpO2 100%   BMI 34.59 kg/m²     General appearance: No apparent distress, appears stated age and cooperative. HEENT: Pupils equal, round, and reactive to light. Conjunctivae/corneas clear. Neck: Supple, with full range of motion. No jugular venous distention. Trachea midline. Respiratory:  Normal respiratory effort. Clear to auscultation, bilaterally without Rales/Wheezes/Rhonchi. Cardiovascular: Regular rate and rhythm with normal S1/S2 without murmurs, rubs or gallops. Abdomen: Soft, non-tender, non-distended with normal bowel sounds. Musculoskeletal: No clubbing, cyanosis or edema bilaterally. Full range of motion without deformity.   Skin: Skin color, texture, turgor normal.  No rashes or lesions. Neurologic:  Neurovascularly intact without any focal sensory/motor deficits. Cranial nerves: II-XII intact, grossly non-focal.  Psychiatric: Alert and oriented, thought content appropriate, normal insight  Capillary Refill: Brisk,< 3 seconds   Peripheral Pulses: +2 palpable, equal bilaterally       Labs:   Recent Labs     01/30/23 0421 01/31/23 0423 02/01/23  0455   WBC 9.8 8.4 8.7   HGB 8.9* 7.6* 8.5*   HCT 27.3* 23.8* 25.6*    290 309       Recent Labs     01/30/23 0421 01/31/23 0423 02/01/23  0455   * 129* 129*   K 5.3* 4.3 4.9   CL 94* 92* 90*   CO2 22 23 20*   BUN 70* 70* 84*   CREATININE 8.1* 7.5* 8.7*   CALCIUM 8.5 8.2* 8.2*   PHOS  --   --  8.5*       Recent Labs     01/29/23 2141   AST 12*   ALT 9*   BILITOT <0.2   ALKPHOS 70       No results for input(s): INR in the last 72 hours.   Recent Labs     01/29/23 2141 01/30/23  0038 01/30/23 0421   TROPONINI 0.16* 0.16* 0.16*         Urinalysis:      Lab Results   Component Value Date/Time    NITRU Negative 12/25/2022 05:00 AM    WBCUA 6-9 12/25/2022 05:00 AM    BACTERIA Rare 12/25/2022 05:00 AM    RBCUA 3-4 12/25/2022 05:00 AM    BLOODU SMALL 12/25/2022 05:00 AM    SPECGRAV 1.020 12/25/2022 05:00 AM    GLUCOSEU 500 12/25/2022 05:00 AM       Consults:    IP CONSULT TO HOSPITALIST  IP CONSULT TO NEPHROLOGY  IP CONSULT TO HEART FAILURE NURSE/COORDINATOR  IP CONSULT TO DIETITIAN      Assessment/Plan:    Active Hospital Problems    Diagnosis     Ischemic cardiomyopathy [I25.5]      Priority: High    Dyslipidemia [E78.5]      Priority: High    DM2 (diabetes mellitus, type 2) ([facility] [E11.9]      Priority: High    Hypertensive urgency [I16.0]      Priority: Medium    Acute respiratory failure with hypoxemia (HCC) [J96.01]      Priority: Medium    HFrEF (heart failure with reduced ejection fraction) ([facility] [I50.20]      Priority: Medium    Asthma-COPD overlap syndrome ([facility] [J44.9]      Priority: Medium    PAF (paroxysmal atrial fibrillation) ([facility]) [I48.0]     ESRD (end stage renal disease) ([facility] [N18.6]     ZAINAB on CPAP [G47.33, Z99.89]        Acute on chronic, respiratory failure with hypoxemia  Etiology is obscure, clinically he could have been in COPD exacerbation with reported wheezing on the field, however when I examined him his lungs are clear  -Spoke with ED physician and discussed treatment plan  -BiPAP initiated by squad, continued and ER and ICU. After a few hours we were able to wean him off to nasal cannula oxygen. Currently on 3 LPM  -CT did not show any significant pulmonary edema or pleural effusions  -Continue duo nebs every 4 hours    Hypertensive urgency  -Started on nitro drip in the ED and now weaned off  -Continue metoprolol, Isordil     ESRD on HD - nephrology consulted. Patient reports compliance with HD treatment      HTN/CAD - w/ known CAD but no evidence of active signs/sxs of ischemia/failure. Currently controlled on home meds w/ vitals reviewed and documented as above. Troponin elevation - chronic elevation due to ESRD status. No ischemic changes on EKG. No anginal symptoms     CHF - chronic systolic CHF EF 20 to 66%  -Stable, continue Plavix, Isordil, metoprolol and statin     Afib - chronic paroxysmal of unspecified and clinically unable to determine etiology. Normally rate controlled on BBlocker - continued. Anticoagulated at baseline on home Eliquis due to secondary hypercoaguable state due to Afib - continued. Monitored on tele. Obesity -  With Body mass index is 33.52 kg/m². Complicating assessment and treatment. Placing patient at risk for multiple co-morbidities as well as early death and contributing to the patient's presentation. Counseled on weight loss. ZAINAB - likely 2nd to obesity. Controlled on home CPAP/BiPap - continued, w/ outpatient f/u as previously arranged.         DVT Prophylaxis: Eliquis/IPC       Recent Labs     01/30/23  0421 01/31/23  0426 02/01/23  0455    290 309       Diet: ADULT DIET; Regular; Low Sodium (2 gm); Low Potassium (Less than 3000 mg/day); Low Phosphorus (Less than 1000 mg); 1200 ml  ADULT ORAL NUTRITION SUPPLEMENT; Breakfast, Dinner; Renal Oral Supplement  Code Status: Full Code       PT/OT Eval Status: not yet ordered. Dispo - Remains in ICU. Patient is likely to remain in-house at least for the foreseeable future pending clinical course and subspecialty recs.        Bailee Guillen MD

## 2023-02-01 NOTE — PROGRESS NOTES
02/01/23 1713   Settings/Measurements   PIP Observed 13 cm H20   IPAP 14 cmH20   CPAP/EPAP 6 cmH2O   Vt (Measured) 446 mL   Rate Ordered 8   Resp 18   Insp Rise Time (%) 3 %   FiO2  40 %   I Time/ I Time % 1 s   Minute Volume (L/min) 8.3 Liters   Mask Leak (lpm) 66 lpm   Comfort Level Good   Using Accessory Muscles No

## 2023-02-01 NOTE — PROGRESS NOTES
02/01/23 0352   NIV Type   Skin Assessment Clean, dry, & intact   Equipment Type V60   Mode Bilevel   Mask Type Full face mask   Mask Size Medium   Settings/Measurements   PIP Observed 15 cm H20   IPAP 14 cmH20   CPAP/EPAP 6 cmH2O   Vt (Set, mL) 453 mL   Rate Ordered 8   FiO2  30 %   I Time/ I Time % 1 s   Minute Volume (L/min) 8.8 Liters   Mask Leak (lpm) 46 lpm   Comfort Level Good   Using Accessory Muscles No   SpO2 97   Alarm Settings   Alarms On Y   Low Pressure (cmH2O) 6 cmH2O   High Pressure (cmH2O) 30 cmH2O   Delay Alarm 20 sec(s)   Apnea (secs) 20 secs   RR Low (bpm) 6   RR High (bpm) 45 br/min

## 2023-02-01 NOTE — CARE COORDINATION
2/1/23 Pt plans home with [facility]. Current HD pt Demond Jeronimo MWF. Pt has home o2 and a CPAP. Pt unsure of provider for home o2. Will follow.

## 2023-02-01 NOTE — PROGRESS NOTES
[facility]  Nephrology Follow up Note           Reason for Consult: ESRD  Requesting Physician:  Dr. Molina Izaguirre history  C/o sob  HD on 2/1 , will challenge fluid removal  HD on 1/30 w 2.5l UF over 2h as the system clotted twice during HD , post wt 101 kg     ROS: No fever  PSFH: No visitor    Scheduled Meds:   apixaban  2.5 mg Oral BID    calcium acetate  1 capsule Oral TID WC    clopidogrel  75 mg Oral Daily    DULoxetine  30 mg Oral Daily    isosorbide dinitrate  20 mg Oral TID    metoprolol succinate  100 mg Oral BID    pantoprazole  40 mg Oral QAM AC    pravastatin  40 mg Oral Daily    QUEtiapine  50 mg Oral Nightly    sodium chloride flush  5-40 mL IntraVENous 2 times per day    mupirocin   Nasal BID    epoetin siddharth-epbx  10,000 Units IntraVENous Q MWF    calcitRIOL  1.25 mcg Oral Q MWF     Continuous Infusions:   sodium chloride      nitroGLYCERIN 5 mcg/min (01/30/23 0457)     PRN Meds:.calcium carbonate, gabapentin, sodium chloride flush, sodium chloride, ondansetron **OR** ondansetron, polyethylene glycol, acetaminophen **OR** acetaminophen, ipratropium-albuterol, sennosides-docusate sodium, docusate sodium, loperamide    History of Present Illness on 1/30/2023:    45 y.o. yo male with PMH of ESRD, CHF on baseline home oxygen at 3 L, s/p TAVR, CAD, HTN, DM2, asthma-COPD who is admitted for increase shortness of breath  Patient reports that he has been feeling short of breath since Friday. He completed his dialysis for still felt short of breath. He was needing up to 5 to 6 L of oxygen at home. He presented to the emergency room with increasing shortness of breath initially requiring BiPAP with 3 L of oxygen and has been admitted to ICU.     Physical exam:   Constitutional:  VITALS:  BP (!) 111/59   Pulse 53   Temp 97.3 °F (36.3 °C) (Oral)   Resp 15   Ht 5' 9\" (1.753 m)   Wt 234 lb 3.2 oz (106.2 kg)   SpO2 100%   BMI 34.59 kg/m²   Gen: alert, awake  Neck: No JVD  Skin: Unremarkable  Cardiovascular:  S1, S2 without m/r/g   Respiratory: CTA B without w/r/r; respiratory effort normal  Abdomen:  soft, nt, nd,   Extremities: no lower extremity edema  Neuro/Psy: AAoriented times 3 ; moves all 4 ext    Data/  Recent Labs     01/30/23 0421 01/31/23 0423 02/01/23  0455   WBC 9.8 8.4 8.7   HGB 8.9* 7.6* 8.5*   HCT 27.3* 23.8* 25.6*   MCV 92.4 93.1 93.9    290 309       Recent Labs     01/30/23 0421 01/31/23 0423 02/01/23  0455   * 129* 129*   K 5.3* 4.3 4.9   CL 94* 92* 90*   CO2 22 23 20*   GLUCOSE 258* 236* 169*   PHOS  --   --  8.5*   MG  --  2.20 2.30   BUN 70* 70* 84*   CREATININE 8.1* 7.5* 8.7*   LABGLOM 7* 8* 7*         Assessment  -ESRD on HD Monday Wednesday Friday  -Acute on chronic respiratory failure  -Hypertensive urgency on nitro drip, has been weaned off this morning during my rounds  -CKD/MBD  -Hyperkalemia  -Anemia    Plan  -HD as per [facility] schedule, next on 2/1   Target weight 99.5 kg   challenge as tolerated, anticipate better blood pressure control with fluid removal  -DAVON 10K units as ordered  -Calcitriol 1.25 mcg Monday Wednesday Friday  -serial labs  -renal diet    Thank you for the consultation. Please do not hesitate to call with questions. Myesha Leary MD  Office: [phone]  Fax:    [phone] [address] [facility]

## 2023-02-01 NOTE — OR NURSING
Pt arrived for image guided tunneled dialysis catheter exchange left IJ . Procedure explained including the risk and benefits of the procedure. All questions answered. Pt verbalizes understanding of the procedure and states no more questions. Consent confirmed. Vital signs stable. Labs, allergies, medications, and code status reviewed. No contraindications noted.      Vital Signs  Vitals:    02/01/23 1537   BP: (!) 130/111   Pulse: 67   Resp: 22   Temp:    SpO2: 100%    (vital signs in table format)    Pre Ernesto Score  2 - Able to move 4 extremities voluntarily on command  2 - BP+/- 20mmHg of normal  2 - Fully awake  1 - Needs oxygen to maintain oxygen saturation >90%  2 - Able to breathe deeply and cough freely    Allergies  Morphine (allergies)    Labs  Lab Results   Component Value Date    INR 1.15 (H) 02/01/2023    PROTIME 14.6 (H) 02/01/2023     Lab Results   Component Value Date    CREATININE 8.7 (HH) 02/01/2023    BUN 84 (HH) 02/01/2023     (L) 02/01/2023    GFR >60 05/17/2013    K 4.9 02/01/2023    CL 90 (L) 02/01/2023    CO2 20 (L) 02/01/2023     Lab Results   Component Value Date    WBC 8.7 02/01/2023    HGB 8.5 (L) 02/01/2023    HCT 25.6 (L) 02/01/2023    MCV 93.9 02/01/2023     02/01/2023

## 2023-02-01 NOTE — PROGRESS NOTES
Shift: [id number]    Admitting diagnosis: ARF    Presentation to hospital: SOB; on CPAP via EMS    Surgery:no    Nursing assessment at handoff  stable    Emergency Contact/POA: Qiana Rowellrk  Family updated: yes @ bedside     Most recent vitals: /63   Pulse 64   Temp 98.4 °F (36.9 °C) (Oral)   Resp 19   Ht 5' 9\" (1.753 m)   Wt 222 lb 10.6 oz (101 kg)   SpO2 100%   BMI 32.88 kg/m²      Rhythm: Normal Sinus Rhythm      NC/HFNC- 2 lpm (Baseline @ 3L home)  Respiratory support: - No ventilator support    Vent days: Day 0      Increased O2 requirements: No    Admission weight Weight: 227 lb (103 kg)  Today's weight   Wt Readings from Last 1 Encounters:   01/30/23 222 lb 10.6 oz (101 kg)         UOP >30ml/hr: no     Wharton need assessed each shift: N/A    Restraints: N/A  Order current and documentation up to date? Lines/Drains  LDA Insertion Date Discontinued Date Dressing Changes   PIV  EJ 1/29     TLC       Arterial       Wharton       Vas Cath 4/26/22     ETT       Surgical drains        Night Shift Hospitalist Interventions    Problem(Brief) Date Time Intervention Physician contacted                                               Drip rates at handoff:    sodium chloride      nitroGLYCERIN 5 mcg/min (01/30/23 1779)       Hospital Course Daily Updates:  Admit Day# 1  -HD @ bedside  -Independent  -Nitro gtt off  -Transfer orders placed    Admit Day 2:   - no significant events       Lab Data:   CBC:   Recent Labs     01/30/23  0421 01/31/23  0423   WBC 9.8 8.4   HGB 8.9* 7.6*   HCT 27.3* 23.8*   MCV 92.4 93.1    290       BMP:    Recent Labs     01/30/23  0421 01/31/23  0423   * 129*   K 5.3* 4.3   CO2 22 23   BUN 70* 70*   CREATININE 8.1* 7.5*       LIVR:   Recent Labs     01/28/23 2322 01/29/23  2141   AST 11* 12*   ALT 8* 9*       PT/INR:   Recent Labs     01/28/23 2322 01/29/23  2141   PROT 7.8 7.1       APTT: No results for input(s): APTT in the last 72 hours.   ABG: No results for input(s): PHART, BDQ9DNQ, PO2ART in the last 72 hours.   Consults (if GI or Nephrology- which group?)-  nephrology ([facility])

## 2023-02-01 NOTE — OR NURSING
Time out completed prior to procedure. Pt was place supine on the IR table. Pt was placed on all cardiac monitoring.  Pt arrived on 6 L NC

## 2023-02-02 LAB
ALBUMIN SERPL-MCNC: 3.5 G/DL (ref 3.4–5)
ANION GAP SERPL CALCULATED.3IONS-SCNC: 13 MMOL/L (ref 3–16)
BLOOD CULTURE, ROUTINE: NORMAL
BUN BLDV-MCNC: 51 MG/DL (ref 7–20)
CALCIUM SERPL-MCNC: 8.4 MG/DL (ref 8.3–10.6)
CHLORIDE BLD-SCNC: 92 MMOL/L (ref 99–110)
CO2: 23 MMOL/L (ref 21–32)
CREAT SERPL-MCNC: 6 MG/DL (ref 0.9–1.3)
CULTURE, BLOOD 2: NORMAL
GFR SERPL CREATININE-BSD FRML MDRD: 10 ML/MIN/{1.73_M2}
GLUCOSE BLD-MCNC: 148 MG/DL (ref 70–99)
GLUCOSE BLD-MCNC: 158 MG/DL (ref 70–99)
GLUCOSE BLD-MCNC: 205 MG/DL (ref 70–99)
GLUCOSE BLD-MCNC: 211 MG/DL (ref 70–99)
GLUCOSE BLD-MCNC: 263 MG/DL (ref 70–99)
HCT VFR BLD CALC: 25.4 % (ref 40.5–52.5)
HEMOGLOBIN: 8.4 G/DL (ref 13.5–17.5)
MAGNESIUM: 2 MG/DL (ref 1.8–2.4)
MCH RBC QN AUTO: 31 PG (ref 26–34)
MCHC RBC AUTO-ENTMCNC: 33.2 G/DL (ref 31–36)
MCV RBC AUTO: 93.3 FL (ref 80–100)
PDW BLD-RTO: 16.3 % (ref 12.4–15.4)
PERFORMED ON: ABNORMAL
PHOSPHORUS: 5.5 MG/DL (ref 2.5–4.9)
PLATELET # BLD: 302 K/UL (ref 135–450)
PMV BLD AUTO: 7.5 FL (ref 5–10.5)
POTASSIUM SERPL-SCNC: 4.3 MMOL/L (ref 3.5–5.1)
PRO-BNP: ABNORMAL PG/ML (ref 0–124)
RBC # BLD: 2.72 M/UL (ref 4.2–5.9)
SODIUM BLD-SCNC: 128 MMOL/L (ref 136–145)
WBC # BLD: 8.2 K/UL (ref 4–11)

## 2023-02-02 PROCEDURE — 90935 HEMODIALYSIS ONE EVALUATION: CPT

## 2023-02-02 PROCEDURE — 36415 COLL VENOUS BLD VENIPUNCTURE: CPT

## 2023-02-02 PROCEDURE — 2700000000 HC OXYGEN THERAPY PER DAY

## 2023-02-02 PROCEDURE — 6370000000 HC RX 637 (ALT 250 FOR IP): Performed by: INTERNAL MEDICINE

## 2023-02-02 PROCEDURE — 2060000000 HC ICU INTERMEDIATE R&B

## 2023-02-02 PROCEDURE — 83880 ASSAY OF NATRIURETIC PEPTIDE: CPT

## 2023-02-02 PROCEDURE — 2580000003 HC RX 258: Performed by: INTERNAL MEDICINE

## 2023-02-02 PROCEDURE — 94660 CPAP INITIATION&MGMT: CPT

## 2023-02-02 PROCEDURE — 6360000002 HC RX W HCPCS

## 2023-02-02 PROCEDURE — 83735 ASSAY OF MAGNESIUM: CPT

## 2023-02-02 PROCEDURE — 94761 N-INVAS EAR/PLS OXIMETRY MLT: CPT

## 2023-02-02 PROCEDURE — 2500000003 HC RX 250 WO HCPCS: Performed by: INTERNAL MEDICINE

## 2023-02-02 PROCEDURE — 85027 COMPLETE CBC AUTOMATED: CPT

## 2023-02-02 PROCEDURE — 80069 RENAL FUNCTION PANEL: CPT

## 2023-02-02 RX ORDER — HEPARIN SODIUM 1000 [USP'U]/ML
3600 INJECTION, SOLUTION INTRAVENOUS; SUBCUTANEOUS PRN
Status: DISCONTINUED | OUTPATIENT
Start: 2023-02-02 | End: 2023-02-06 | Stop reason: HOSPADM

## 2023-02-02 RX ADMIN — CLOPIDOGREL BISULFATE 75 MG: 75 TABLET ORAL at 08:49

## 2023-02-02 RX ADMIN — METOPROLOL SUCCINATE 100 MG: 50 TABLET, EXTENDED RELEASE ORAL at 08:48

## 2023-02-02 RX ADMIN — CALCIUM ACETATE 667 MG: 667 CAPSULE ORAL at 08:48

## 2023-02-02 RX ADMIN — HEPARIN SODIUM 3600 UNITS: 1000 INJECTION INTRAVENOUS; SUBCUTANEOUS at 16:16

## 2023-02-02 RX ADMIN — APIXABAN 2.5 MG: 2.5 TABLET, FILM COATED ORAL at 08:49

## 2023-02-02 RX ADMIN — CALCIUM ACETATE 667 MG: 667 CAPSULE ORAL at 17:08

## 2023-02-02 RX ADMIN — QUETIAPINE FUMARATE 50 MG: 25 TABLET ORAL at 22:26

## 2023-02-02 RX ADMIN — CALCIUM ACETATE 667 MG: 667 CAPSULE ORAL at 12:05

## 2023-02-02 RX ADMIN — SODIUM CHLORIDE, PRESERVATIVE FREE 10 ML: 5 INJECTION INTRAVENOUS at 08:49

## 2023-02-02 RX ADMIN — SODIUM CHLORIDE, PRESERVATIVE FREE 10 ML: 5 INJECTION INTRAVENOUS at 22:47

## 2023-02-02 RX ADMIN — PRAVASTATIN SODIUM 40 MG: 40 TABLET ORAL at 08:49

## 2023-02-02 RX ADMIN — DULOXETINE HYDROCHLORIDE 30 MG: 30 CAPSULE, DELAYED RELEASE ORAL at 08:49

## 2023-02-02 RX ADMIN — ISOSORBIDE DINITRATE 20 MG: 20 TABLET ORAL at 22:26

## 2023-02-02 RX ADMIN — ISOSORBIDE DINITRATE 20 MG: 20 TABLET ORAL at 08:49

## 2023-02-02 RX ADMIN — METOPROLOL SUCCINATE 100 MG: 50 TABLET, EXTENDED RELEASE ORAL at 22:26

## 2023-02-02 RX ADMIN — APIXABAN 2.5 MG: 2.5 TABLET, FILM COATED ORAL at 22:26

## 2023-02-02 NOTE — FLOWSHEET NOTE
Treatment time: 2.5 hours ( UF only)  Net UF: 4000 ml    Pre weight: 103.6 kg ( Stand-up)  Post weight: 99.6 kg  EDW: 99.5 kg    Access used: Left chest wall TDC  Access function: Good with  ml/min    Medications or blood products given: no    Regular outpatient schedule: [facility]( UF only today)    Summary of response to treatment: Pt's orthopnea when he arrived HD, Pt stated better and able to lay down when fluid removed > 1500 ml, Fluid restriction re-enforced.  vital signs stable during HD, HD completed in full, Heparin dwell in TDC, capped and clamped    Cirt Line: Initial Hct: 27.7;   End Profile : A; Refill ( Hct.1 -32.5  subtract Hct 2- 31.9): 0. ( yes Refill); BV: -13.2 %      Copy of dialysis treatment record placed in chart, to be scanned into EMR.    02/02/23 1348 02/02/23 1625   Vital Signs   BP (!) 146/89 104/82   Temp 98.8 °F (37.1 °C) 97.8 °F (36.6 °C)   Heart Rate 68 64   Resp 28 16   Weight 228 lb 6.3 oz (103.6 kg) 219 lb 9.3 oz (99.6 kg)   Weight Method Actual;Standing scale Standing scale   Percent Weight Change -1.33 -3.86   Dry Weight 219 lb 5.7 oz (99.5 kg)  --

## 2023-02-02 NOTE — PROGRESS NOTES
Hospitalist Progress Note      PCP: Pcp No    Date of Admission: 1/29/2023    Chief Complaint: SOB    Subjective: no new c/o. Medications:  Reviewed    Infusion Medications    sodium chloride      nitroGLYCERIN 5 mcg/min (01/30/23 6801)     Scheduled Medications    apixaban  2.5 mg Oral BID    calcium acetate  1 capsule Oral TID WC    clopidogrel  75 mg Oral Daily    DULoxetine  30 mg Oral Daily    isosorbide dinitrate  20 mg Oral TID    metoprolol succinate  100 mg Oral BID    pantoprazole  40 mg Oral QAM AC    pravastatin  40 mg Oral Daily    QUEtiapine  50 mg Oral Nightly    sodium chloride flush  5-40 mL IntraVENous 2 times per day    mupirocin   Nasal BID    epoetin siddharth-epbx  10,000 Units IntraVENous Q MWF    calcitRIOL  1.25 mcg Oral Q MWF     PRN Meds: heparin (porcine), calcium carbonate, gabapentin, sodium chloride flush, sodium chloride, ondansetron **OR** ondansetron, polyethylene glycol, acetaminophen **OR** acetaminophen, ipratropium-albuterol, sennosides-docusate sodium, docusate sodium, loperamide      Intake/Output Summary (Last 24 hours) at 2/2/2023 0852  Last data filed at 2/2/2023 6562  Gross per 24 hour   Intake 830 ml   Output --   Net 830 ml         Physical Exam Performed:    /73   Pulse 68   Temp 97.4 °F (36.3 °C) (Oral)   Resp 20   Ht 5' 9\" (1.753 m)   Wt 231 lb 7.7 oz (105 kg)   SpO2 100%   BMI 34.18 kg/m²     General appearance: No apparent distress, appears stated age and cooperative. HEENT: Pupils equal, round, and reactive to light. Conjunctivae/corneas clear. Neck: Supple, with full range of motion. No jugular venous distention. Trachea midline. Respiratory:  Normal respiratory effort. Clear to auscultation, bilaterally without Rales/Wheezes/Rhonchi. Cardiovascular: Regular rate and rhythm with normal S1/S2 without murmurs, rubs or gallops. Abdomen: Soft, non-tender, non-distended with normal bowel sounds.   Musculoskeletal: No clubbing, cyanosis or edema bilaterally. Full range of motion without deformity. Skin: Skin color, texture, turgor normal.  No rashes or lesions. Neurologic:  Neurovascularly intact without any focal sensory/motor deficits. Cranial nerves: II-XII intact, grossly non-focal.  Psychiatric: Alert and oriented, thought content appropriate, normal insight  Capillary Refill: Brisk,< 3 seconds   Peripheral Pulses: +2 palpable, equal bilaterally       Labs:   Recent Labs     01/31/23 0423 02/01/23 0455 02/02/23 0455   WBC 8.4 8.7 8.2   HGB 7.6* 8.5* 8.4*   HCT 23.8* 25.6* 25.4*    309 302       Recent Labs     01/31/23 0423 02/01/23 0455 02/02/23 0455   * 129* 128*   K 4.3 4.9 4.3   CL 92* 90* 92*   CO2 23 20* 23   BUN 70* 84* 51*   CREATININE 7.5* 8.7* 6.0*   CALCIUM 8.2* 8.2* 8.4   PHOS  --  8.5* 5.5*       No results for input(s): AST, ALT, BILIDIR, BILITOT, ALKPHOS in the last 72 hours. Recent Labs     02/01/23  1421   INR 1.15*     No results for input(s): Linda Michel in the last 72 hours.       Urinalysis:      Lab Results   Component Value Date/Time    NITRU Negative 12/25/2022 05:00 AM    WBCUA 6-9 12/25/2022 05:00 AM    BACTERIA Rare 12/25/2022 05:00 AM    RBCUA 3-4 12/25/2022 05:00 AM    BLOODU SMALL 12/25/2022 05:00 AM    SPECGRAV 1.020 12/25/2022 05:00 AM    GLUCOSEU 500 12/25/2022 05:00 AM       Consults:    IP CONSULT TO HOSPITALIST  IP CONSULT TO NEPHROLOGY  IP CONSULT TO HEART FAILURE NURSE/COORDINATOR  IP CONSULT TO DIETITIAN      Assessment/Plan:    Active Hospital Problems    Diagnosis     Ischemic cardiomyopathy [I25.5]      Priority: High    Dyslipidemia [E78.5]      Priority: High    DM2 (diabetes mellitus, type 2) ([facility] [E11.9]      Priority: High    Hypertensive urgency [I16.0]      Priority: Medium    Acute respiratory failure with hypoxemia (HCC) [J96.01]      Priority: Medium    HFrEF (heart failure with reduced ejection fraction) ([facility] [I50.20]      Priority: Medium    Asthma-COPD overlap syndrome ([facility] [J44.9]      Priority: Medium    PAF (paroxysmal atrial fibrillation) ([facility]) [I48.0]     ESRD (end stage renal disease) ([facility] [N18.6]     ZAINAB on CPAP [G47.33, Z99.89]          Acute on chronic, respiratory failure with hypoxemia  Etiology is obscure, clinically he could have been in COPD exacerbation with reported wheezing on the field, however when I examined him his lungs are clear  -BiPAP initiated by squad, continued and ER and ICU. After a few hours we were able to wean him off to nasal cannula oxygen. Currently on 3 LPM  -CT did not show any significant pulmonary edema or pleural effusions    Hypertensive urgency  -Started on nitro drip in the ED and now weaned off  -Continue metoprolol, Isordil     ESRD on HD - Nephrology consulted. Patient reports compliance with HD treatment      HTN/CAD - w/ known CAD but no evidence of active signs/sxs of ischemia/failure. Currently controlled on home meds w/ vitals reviewed and documented as above. Troponin elevation - chronic elevation due to ESRD status. No ischemic changes on EKG. No anginal symptoms     CHF - chronic systolic failure w/ reduced EF 25-30% by Echo dated Dec 2022. Likely due to ischemic and hypertensive heart disease. Patient is euvolemic w/ no evidence of acute decompensation. Continue current medical mgt. Afib - chronic paroxysmal of unspecified and clinically unable to determine etiology. Normally rate controlled on BBlocker - continued. Anticoagulated at baseline on home Eliquis due to secondary hypercoaguable state due to Afib - continued. Monitored on tele. Obesity -  With Body mass index is 33.52 kg/m². Complicating assessment and treatment. Placing patient at risk for multiple co-morbidities as well as early death and contributing to the patient's presentation. Counseled on weight loss. ZAINAB - likely 2nd to obesity. Controlled on home CPAP/BiPap - continued, w/ outpatient f/u as previously arranged. DVT Prophylaxis: Eliquis/IPC       Recent Labs     01/31/23  0423 02/01/23  0455 02/02/23  0455    309 302       Diet: ADULT DIET; Regular; Low Sodium (2 gm); Low Potassium (Less than 3000 mg/day); Low Phosphorus (Less than 1000 mg); 1200 ml  ADULT ORAL NUTRITION SUPPLEMENT; Breakfast, Dinner; Renal Oral Supplement  Code Status: Full Code       PT/OT Eval Status: not yet ordered.       Dispo - Patient is likely to remain in-house at least until Sat/Sunday 4/5 Feb pending clinical course and subspecialty Ricky Arroyo MD

## 2023-02-02 NOTE — PROGRESS NOTES
02/01/23 2145   NIV Type   Equipment Type v60   Mode Bilevel   Mask Type Full face mask   Mask Size Medium   Settings/Measurements   PIP Observed 16 cm H20   IPAP 14 cmH20   CPAP/EPAP 6 cmH2O   Vt (Measured) 490 mL   Rate Ordered 8   Resp 25   Insp Rise Time (%) 3 %   FiO2  40 %   I Time/ I Time % 1 s   Minute Volume (L/min) 18 Liters   Mask Leak (lpm) 46 lpm   Comfort Level Good   Using Accessory Muscles No   Patient's Home Machine No   Alarm Settings   Alarms On Y   Low Pressure (cmH2O) 6 cmH2O   High Pressure (cmH2O) 30 cmH2O   RR Low (bpm) 6   RR High (bpm) 45 br/min

## 2023-02-02 NOTE — PROGRESS NOTES
02/01/23 2328   NIV Type   NIV Started/Stopped On   Equipment Type v60   Mode Bilevel   Mask Type Full face mask   Mask Size Medium   Settings/Measurements   IPAP 14 cmH20   CPAP/EPAP 6 cmH2O   Vt (Measured) 501 mL   Rate Ordered 8   Resp 19   FiO2  30 %   Mask Leak (lpm) 39 lpm   Comfort Level Good   Using Accessory Muscles No   SpO2 69   Alarm Settings   Alarms On Y   Low Pressure (cmH2O) 6 cmH2O   High Pressure (cmH2O) 30 cmH2O

## 2023-02-02 NOTE — PROGRESS NOTES
02/02/23 0334   NIV Type   Equipment Type V60   Mode Bilevel   Mask Type Full face mask   Mask Size Medium   Settings/Measurements   PIP Observed 14 cm H20   IPAP 14 cmH20   CPAP/EPAP 6 cmH2O   Vt (Measured) 490 mL   Rate Ordered 8   Resp 20   FiO2  30 %   I Time/ I Time % 1 s   Minute Volume (L/min) 9.2 Liters   Mask Leak (lpm) 30 lpm   Comfort Level Good   Using Accessory Muscles No

## 2023-02-02 NOTE — PROGRESS NOTES
Physician Progress Note      Tyler Moralez  CSN #:                  [id number]  :                       1970  ADMIT DATE:       2023 9:20 PM  [address] [place] DATE:  RESPONDING  PROVIDER #:        Nickie Villaseñor MD          QUERY TEXT:    Pt admitted with hypertensive urgency and acute respiratory failure. Pt noted   to have ESRD. If possible, please document in progress notes and discharge   summary the relationship, if any, between hypertensive urgency/acute   respiratory failure and ESRD. The medical record reflects the following:  Risk Factors: Age, ESRD, CHF, COPD  Clinical Indicators:  Nephrology -  \"-HD as per [facility] schedule, next on   Target weight 99.5 kg  challenge as tolerated, anticipate better blood pressure control with fluid   removal\"  Treatment: Nephrology consult, hemodialysis, serial labs and supportive care    Thank you,  Orion Wilkins, RN, BSN, CRCR  Options provided:  -- Acute respiratory failure and HTN Urgency due to fluid overload in ESRD,   improving with HD sessions  -- Other - I will add my own diagnosis  -- Disagree - Not applicable / Not valid  -- Disagree - Clinically unable to determine / Unknown  -- Refer to Clinical Documentation Reviewer    PROVIDER RESPONSE TEXT:    This patient has acute respiratory failure and HTN Urgency due to fluid   overload in ESRD, improving with HD sessions.     Query created by: Arben Whaley on 2023 3:02 PM      Electronically signed by:  Nickie Villaseñor MD 2023 3:39 PM

## 2023-02-02 NOTE — PROGRESS NOTES
[facility]  Nephrology Follow up Note           Reason for Consult: ESRD  Requesting Physician:  Dr. Kim Pink history  C/o sob and PND  overnight    HD on 2/1 c/b by catheter dysfunction ultimately required exchange, had ~4l net UF over 3h, post wt 104.5 kg   HD on 1/30 w 2.5l UF over 2h as the system clotted twice during HD , post wt 101 kg     ROS: No fever  PSFH: No visitor    Scheduled Meds:   apixaban  2.5 mg Oral BID    calcium acetate  1 capsule Oral TID WC    clopidogrel  75 mg Oral Daily    DULoxetine  30 mg Oral Daily    isosorbide dinitrate  20 mg Oral TID    metoprolol succinate  100 mg Oral BID    pantoprazole  40 mg Oral QAM AC    pravastatin  40 mg Oral Daily    QUEtiapine  50 mg Oral Nightly    sodium chloride flush  5-40 mL IntraVENous 2 times per day    epoetin siddharth-epbx  10,000 Units IntraVENous Q MWF    calcitRIOL  1.25 mcg Oral Q MWF     Continuous Infusions:   sodium chloride      nitroGLYCERIN 5 mcg/min (01/30/23 0457)     PRN Meds:.heparin (porcine), calcium carbonate, gabapentin, sodium chloride flush, sodium chloride, ondansetron **OR** ondansetron, polyethylene glycol, acetaminophen **OR** acetaminophen, ipratropium-albuterol, sennosides-docusate sodium, docusate sodium, loperamide    History of Present Illness on 1/30/2023:    45 y.o. yo male with PMH of ESRD, CHF on baseline home oxygen at 3 L, s/p TAVR, CAD, HTN, DM2, asthma-COPD who is admitted for increase shortness of breath  Patient reports that he has been feeling short of breath since Friday. He completed his dialysis for still felt short of breath. He was needing up to 5 to 6 L of oxygen at home. He presented to the emergency room with increasing shortness of breath initially requiring BiPAP with 3 L of oxygen and has been admitted to ICU.     Physical exam:   Constitutional:  VITALS:  /73   Pulse 68   Temp 97.4 °F (36.3 °C) (Oral)   Resp 20   Ht 5' 9\" (1.753 m)   Wt 231 lb 7.7 oz (105 kg)   SpO2 100%   BMI 34.18 kg/m²   Gen: alert, awake  Neck: No JVD  Skin: Unremarkable  Cardiovascular:  S1, S2 without m/r/g   Respiratory: CTA B without w/r/r; respiratory effort normal  Abdomen:  soft, nt, nd,   Extremities: no lower extremity edema  Neuro/Psy: AAoriented times 3 ; moves all 4 ext    Data/  Recent Labs     01/31/23 0423 02/01/23 0455 02/02/23 0455   WBC 8.4 8.7 8.2   HGB 7.6* 8.5* 8.4*   HCT 23.8* 25.6* 25.4*   MCV 93.1 93.9 93.3    309 302       Recent Labs     01/31/23 0423 02/01/23 0455 02/02/23 0455   * 129* 128*   K 4.3 4.9 4.3   CL 92* 90* 92*   CO2 23 20* 23   GLUCOSE 236* 169* 205*   PHOS  --  8.5* 5.5*   MG 2.20 2.30 2.00   BUN 70* 84* 51*   CREATININE 7.5* 8.7* 6.0*   LABGLOM 8* 7* 10*         Assessment  -ESRD on HD Monday Wednesday Friday  -Acute on chronic respiratory failure  -Hypertensive urgency on nitro drip, has been weaned off this morning during my rounds  -CKD/MBD  -Hyperkalemia  -Anemia    Plan  -HD as per [facility] schedule   Target weight 99.5 kg   Extra UF session on 2/2 for 2. 5h w upto 4l   -DAVON 10K units as ordered  -Calcitriol 1.25 mcg Monday Wednesday Friday  -serial labs  -renal diet    Thank you for the consultation. Please do not hesitate to call with questions. Thersa Snellen, MD  Office: [phone]  Fax:    [phone] [address] [facility]

## 2023-02-02 NOTE — FLOWSHEET NOTE
02/01/23 1638 02/01/23 1950   Vital Signs   BP (!) 137/49 137/80   Temp 97.7 °F (36.5 °C) 98 °F (36.7 °C)   Heart Rate 63 65   Weight 237 lb 10.5 oz (107.8 kg) 230 lb 6.1 oz (104.5 kg)   Weight Method Bed scale Bed scale     Treatment time: 180min    Net UF: 3983ml    Pre weight: 107.8k  Post weight: 104.5k  EDW: 99.5k    Access used: LIJ TDC  Access function: Good    Medications or blood products given: Epogen [id number]    Regular outpatient schedule: MWF    Summary of response to treatment: Tolerated well. Copy of dialysis treatment record placed in chart, to be scanned into EMR.

## 2023-02-03 ENCOUNTER — APPOINTMENT (OUTPATIENT)
Dept: GENERAL RADIOLOGY | Age: 53
DRG: 640 | End: 2023-02-03
Payer: MEDICARE

## 2023-02-03 LAB
ALBUMIN SERPL-MCNC: 3.5 G/DL (ref 3.4–5)
ANION GAP SERPL CALCULATED.3IONS-SCNC: 18 MMOL/L (ref 3–16)
BUN BLDV-MCNC: 70 MG/DL (ref 7–20)
CALCIUM SERPL-MCNC: 8.9 MG/DL (ref 8.3–10.6)
CHLORIDE BLD-SCNC: 90 MMOL/L (ref 99–110)
CO2: 22 MMOL/L (ref 21–32)
CREAT SERPL-MCNC: 7.7 MG/DL (ref 0.9–1.3)
GFR SERPL CREATININE-BSD FRML MDRD: 8 ML/MIN/{1.73_M2}
GLUCOSE BLD-MCNC: 149 MG/DL (ref 70–99)
HCT VFR BLD CALC: 26.3 % (ref 40.5–52.5)
HEMOGLOBIN: 8.6 G/DL (ref 13.5–17.5)
MAGNESIUM: 2.2 MG/DL (ref 1.8–2.4)
MCH RBC QN AUTO: 30.4 PG (ref 26–34)
MCHC RBC AUTO-ENTMCNC: 32.5 G/DL (ref 31–36)
MCV RBC AUTO: 93.6 FL (ref 80–100)
PDW BLD-RTO: 16.5 % (ref 12.4–15.4)
PHOSPHORUS: 7.3 MG/DL (ref 2.5–4.9)
PLATELET # BLD: 315 K/UL (ref 135–450)
PMV BLD AUTO: 7.7 FL (ref 5–10.5)
POTASSIUM SERPL-SCNC: 4.8 MMOL/L (ref 3.5–5.1)
RBC # BLD: 2.81 M/UL (ref 4.2–5.9)
SODIUM BLD-SCNC: 130 MMOL/L (ref 136–145)
WBC # BLD: 9.9 K/UL (ref 4–11)

## 2023-02-03 PROCEDURE — 6370000000 HC RX 637 (ALT 250 FOR IP): Performed by: INTERNAL MEDICINE

## 2023-02-03 PROCEDURE — 94660 CPAP INITIATION&MGMT: CPT

## 2023-02-03 PROCEDURE — 2700000000 HC OXYGEN THERAPY PER DAY

## 2023-02-03 PROCEDURE — 94761 N-INVAS EAR/PLS OXIMETRY MLT: CPT

## 2023-02-03 PROCEDURE — 80069 RENAL FUNCTION PANEL: CPT

## 2023-02-03 PROCEDURE — 6360000002 HC RX W HCPCS: Performed by: INTERNAL MEDICINE

## 2023-02-03 PROCEDURE — 85027 COMPLETE CBC AUTOMATED: CPT

## 2023-02-03 PROCEDURE — 2580000003 HC RX 258: Performed by: INTERNAL MEDICINE

## 2023-02-03 PROCEDURE — 90935 HEMODIALYSIS ONE EVALUATION: CPT

## 2023-02-03 PROCEDURE — 2060000000 HC ICU INTERMEDIATE R&B

## 2023-02-03 PROCEDURE — 6360000002 HC RX W HCPCS

## 2023-02-03 PROCEDURE — 71046 X-RAY EXAM CHEST 2 VIEWS: CPT

## 2023-02-03 PROCEDURE — 83735 ASSAY OF MAGNESIUM: CPT

## 2023-02-03 PROCEDURE — 2500000003 HC RX 250 WO HCPCS: Performed by: INTERNAL MEDICINE

## 2023-02-03 PROCEDURE — 36415 COLL VENOUS BLD VENIPUNCTURE: CPT

## 2023-02-03 RX ORDER — HEPARIN SODIUM 1000 [USP'U]/ML
INJECTION, SOLUTION INTRAVENOUS; SUBCUTANEOUS
Status: COMPLETED
Start: 2023-02-03 | End: 2023-02-03

## 2023-02-03 RX ADMIN — SODIUM CHLORIDE, PRESERVATIVE FREE 10 ML: 5 INJECTION INTRAVENOUS at 21:21

## 2023-02-03 RX ADMIN — PRAVASTATIN SODIUM 40 MG: 40 TABLET ORAL at 12:48

## 2023-02-03 RX ADMIN — ISOSORBIDE DINITRATE 20 MG: 20 TABLET ORAL at 12:48

## 2023-02-03 RX ADMIN — CALCIUM ACETATE 667 MG: 667 CAPSULE ORAL at 15:28

## 2023-02-03 RX ADMIN — LOPERAMIDE HYDROCHLORIDE 2 MG: 2 CAPSULE ORAL at 15:29

## 2023-02-03 RX ADMIN — HEPARIN SODIUM 3600 UNITS: 1000 INJECTION INTRAVENOUS; SUBCUTANEOUS at 10:34

## 2023-02-03 RX ADMIN — METOPROLOL SUCCINATE 100 MG: 50 TABLET, EXTENDED RELEASE ORAL at 21:16

## 2023-02-03 RX ADMIN — CALCITRIOL 1.25 MCG: 0.25 CAPSULE ORAL at 12:57

## 2023-02-03 RX ADMIN — APIXABAN 2.5 MG: 2.5 TABLET, FILM COATED ORAL at 21:16

## 2023-02-03 RX ADMIN — ISOSORBIDE DINITRATE 20 MG: 20 TABLET ORAL at 21:19

## 2023-02-03 RX ADMIN — DULOXETINE HYDROCHLORIDE 30 MG: 30 CAPSULE, DELAYED RELEASE ORAL at 12:48

## 2023-02-03 RX ADMIN — HEPARIN SODIUM 3800 UNITS: 1000 INJECTION, SOLUTION INTRAVENOUS; SUBCUTANEOUS at 10:33

## 2023-02-03 RX ADMIN — CALCIUM ACETATE 667 MG: 667 CAPSULE ORAL at 12:48

## 2023-02-03 RX ADMIN — CLOPIDOGREL BISULFATE 75 MG: 75 TABLET ORAL at 12:48

## 2023-02-03 RX ADMIN — GABAPENTIN 200 MG: 100 CAPSULE ORAL at 21:34

## 2023-02-03 RX ADMIN — EPOETIN ALFA-EPBX 10000 UNITS: 10000 INJECTION, SOLUTION INTRAVENOUS; SUBCUTANEOUS at 10:31

## 2023-02-03 RX ADMIN — HEPARIN SODIUM 3800 UNITS: 1000 INJECTION INTRAVENOUS; SUBCUTANEOUS at 10:33

## 2023-02-03 RX ADMIN — METOPROLOL SUCCINATE 100 MG: 50 TABLET, EXTENDED RELEASE ORAL at 12:49

## 2023-02-03 RX ADMIN — QUETIAPINE FUMARATE 50 MG: 25 TABLET ORAL at 21:19

## 2023-02-03 RX ADMIN — APIXABAN 2.5 MG: 2.5 TABLET, FILM COATED ORAL at 12:48

## 2023-02-03 ASSESSMENT — PAIN SCALES - GENERAL
PAINLEVEL_OUTOF10: 8
PAINLEVEL_OUTOF10: 7
PAINLEVEL_OUTOF10: 0
PAINLEVEL_OUTOF10: 7

## 2023-02-03 NOTE — PROGRESS NOTES
02/02/23 2333   NIV Type   Skin Protection for O2 Device   (pt refused mepilex)   NIV Started/Stopped On   Equipment Type v60   Mode Bilevel   Mask Type Full face mask   Mask Size Medium   Settings/Measurements   PIP Observed 15 cm H20   IPAP 14 cmH20   CPAP/EPAP 6 cmH2O   Vt (Measured) 850 mL   Rate Ordered 8   Resp 16   FiO2  30 %   I Time/ I Time % 1 s   Minute Volume (L/min) 9.7 Liters   Mask Leak (lpm) 58 lpm   Comfort Level Good   Using Accessory Muscles No   SpO2 100   Patient's Home Machine No   Alarm Settings   Alarms On Y   Low Pressure (cmH2O) 6 cmH2O   High Pressure (cmH2O) 30 cmH2O   Apnea (secs) 20 secs   RR Low (bpm) 6   RR High (bpm) 40 br/min

## 2023-02-03 NOTE — FLOWSHEET NOTE
02/03/23 0820 02/03/23 1200   Treatment   Time On 0825  --    Time Off  --  1157   Vital Signs   /60 (!) 151/74   Temp 97.9 °F (36.6 °C) 98 °F (36.7 °C)   Heart Rate 56 58   Resp 18  --    Weight 221 lb 1.9 oz (100.3 kg) 218 lb 14.7 oz (99.3 kg)   Weight Method Bed scale Bed scale   Treatment time: 3.5 hours   Net UF: 1000 ml     Pre weight: 100.3 kg ( Stand-up)  Post weight: 99.3kg  EDW: 99.5 kg     Access used: Left chest wall TDC  Access function: Good with  ml/min     Medications or blood products given: Retacrit 15,000 units, heparin dwells to line     Regular outpatient schedule: [facility]EVIN Lucas     Summary of response to treatment: Pt SOB during tx, 02 at 4L per NC. Pt states he feels it is his copd, not fluid related. Tried to challenge pt wt without success, became symptomatic going below dry. Dialysis record faxed to floor to be placed in chart and report given to floor RN.

## 2023-02-03 NOTE — PROGRESS NOTES
02/03/23 0346   NIV Type   Equipment Type v60   Mode Bilevel   Mask Type Full face mask   Mask Size Medium   Settings/Measurements   PIP Observed 14 cm H20   IPAP 14 cmH20   CPAP/EPAP 6 cmH2O   Vt (Measured) 462 mL   Rate Ordered 8   Resp 23   FiO2  30 %   I Time/ I Time % 1 s   Minute Volume (L/min) 10 Liters   Mask Leak (lpm) 46 lpm   Comfort Level Good   Using Accessory Muscles No   SpO2 97   Patient's Home Machine No   Alarm Settings   Alarms On Y   Low Pressure (cmH2O) 6 cmH2O   High Pressure (cmH2O) 30 cmH2O   Apnea (secs) 20 secs   RR Low (bpm) 6   RR High (bpm) 40 br/min

## 2023-02-03 NOTE — PROGRESS NOTES
Patient's morning assessment has been completed. Patient is alert and oriented and vital signs are stable. Patient voiced no complaints or concerns at this time. Medications held until after HD. Patient's bed is in the lowest position and call light is within reach.   Will continue to monitor

## 2023-02-03 NOTE — PROGRESS NOTES
[facility] [employer]  Nephrology Follow up Note           Reason for Consult: ESRD  Requesting Physician:  Dr. Haris Bynum history    Seen and examined at HD with 1l UFG to get him to TW of  99.5 kg; pt felt light headed when tried to challenge his TW; reports that he was sob this  am which is improving w HD   UF w 4l over 2.5h on 2/3; post wt 99.6 kg   HD on 2/1 c/b by catheter dysfunction ultimately required exchange, had ~4l net UF over 3h, post wt 104.5 kg   HD on 1/30 w 2.5l UF over 2h as the system clotted twice during HD , post wt 101 kg     ROS: No fever  PSFH: No visitor    Scheduled Meds:   apixaban  2.5 mg Oral BID    calcium acetate  1 capsule Oral TID WC    clopidogrel  75 mg Oral Daily    DULoxetine  30 mg Oral Daily    isosorbide dinitrate  20 mg Oral TID    metoprolol succinate  100 mg Oral BID    pantoprazole  40 mg Oral QAM AC    pravastatin  40 mg Oral Daily    QUEtiapine  50 mg Oral Nightly    sodium chloride flush  5-40 mL IntraVENous 2 times per day    epoetin siddharth-epbx  10,000 Units IntraVENous Q MWF    calcitRIOL  1.25 mcg Oral Q MWF     Continuous Infusions:   sodium chloride      nitroGLYCERIN 5 mcg/min (01/30/23 4530)     PRN Meds:.heparin (porcine), heparin (porcine), calcium carbonate, gabapentin, sodium chloride flush, sodium chloride, ondansetron **OR** ondansetron, polyethylene glycol, acetaminophen **OR** acetaminophen, ipratropium-albuterol, sennosides-docusate sodium, docusate sodium, loperamide    History of Present Illness on 1/30/2023:    45 y.o. yo male with PMH of ESRD, CHF on baseline home oxygen at 3 L, s/p TAVR, CAD, HTN, DM2, asthma-COPD who is admitted for increase shortness of breath  Patient reports that he has been feeling short of breath since Friday. He completed his dialysis for still felt short of breath. He was needing up to 5 to 6 L of oxygen at home.   He presented to the emergency room with increasing shortness of breath initially requiring BiPAP with 3 L of oxygen and has been admitted to ICU. Physical exam:   Constitutional:  VITALS:  /60   Pulse 56   Temp 97.9 °F (36.6 °C)   Resp 18   Ht 5' 9\" (1.753 m)   Wt 221 lb 1.9 oz (100.3 kg)   SpO2 99%   BMI 32.65 kg/m²   Gen: alert, awake  Neck: No JVD  Skin: Unremarkable  Cardiovascular:  S1, S2 without m/r/g   Respiratory: CTA B without w/r/r; respiratory effort normal  Abdomen:  soft, nt, nd,   Extremities: no lower extremity edema  Neuro/Psy: AAoriented times 3 ; moves all 4 ext    Data/  Recent Labs     02/01/23 0455 02/02/23 0455 02/03/23  0502   WBC 8.7 8.2 9.9   HGB 8.5* 8.4* 8.6*   HCT 25.6* 25.4* 26.3*   MCV 93.9 93.3 93.6    302 315       Recent Labs     02/01/23 0455 02/02/23 0455 02/03/23  0502   * 128* 130*   K 4.9 4.3 4.8   CL 90* 92* 90*   CO2 20* 23 22   GLUCOSE 169* 205* 149*   PHOS 8.5* 5.5* 7.3*   MG 2.30 2.00 2.20   BUN 84* 51* 70*   CREATININE 8.7* 6.0* 7.7*   LABGLOM 7* 10* 8*         Assessment  -ESRD on HD Monday Wednesday Friday  -Acute on chronic respiratory failure  -Hypertensive urgency on nitro drip, has been weaned off this morning during my rounds  -CKD/MBD  -Hyperkalemia  -Anemia    Plan  -HD as per [facility] schedule   Target weight 99.5 kg   Likely will need Extra UF session on 2/4 to challenge TW   Obtain CXR after HD on 2/3  -DAVON 10K units as ordered  -Calcitriol 1.25 mcg Monday Wednesday Friday  -serial labs  -renal diet    Thank you for the consultation. Please do not hesitate to call with questions. Param Coleman MD  Office: [phone]  Fax:    [phone]  [facility]. [facility]

## 2023-02-03 NOTE — CARE COORDINATION
Case Management Follow up      Chart reviewed and case discussed during huddle and rounds. Pt is admitted day # 5. Unit C-4. Diagnosis and currently status as per MD progress: ESRD- Nephrology following. OP HD @ [facility]. Pt has been dialyzing daily during admission. Services following:IM, Nephrology     Anticipated Discharge date:likely tomorrow per MD    Expected Plan for [address] home with spouse. Active with [facility] medical house calls. Has home o2- on baseline. Pre cert needed: n/a    Potential Barriers:none    RN CM will continue to follow Pt clinical course for DCP needs.

## 2023-02-03 NOTE — PROGRESS NOTES
Hospitalist Progress Note      PCP: Pcp No    Date of Admission: 1/29/2023    Chief Complaint: SOB    Subjective: no new c/o. Medications:  Reviewed    Infusion Medications    sodium chloride      nitroGLYCERIN 5 mcg/min (01/30/23 045)     Scheduled Medications    heparin (porcine)        apixaban  2.5 mg Oral BID    calcium acetate  1 capsule Oral TID WC    clopidogrel  75 mg Oral Daily    DULoxetine  30 mg Oral Daily    isosorbide dinitrate  20 mg Oral TID    metoprolol succinate  100 mg Oral BID    pantoprazole  40 mg Oral QAM AC    pravastatin  40 mg Oral Daily    QUEtiapine  50 mg Oral Nightly    sodium chloride flush  5-40 mL IntraVENous 2 times per day    epoetin siddharth-epbx  10,000 Units IntraVENous Q MWF    calcitRIOL  1.25 mcg Oral Q MWF     PRN Meds: heparin (porcine), heparin (porcine), calcium carbonate, gabapentin, sodium chloride flush, sodium chloride, ondansetron **OR** ondansetron, polyethylene glycol, acetaminophen **OR** acetaminophen, ipratropium-albuterol, sennosides-docusate sodium, docusate sodium, loperamide      Intake/Output Summary (Last 24 hours) at 2/3/2023 8103  Last data filed at 2/3/2023 0027  Gross per 24 hour   Intake 860 ml   Output --   Net 860 ml         Physical Exam Performed:    /66   Pulse 56   Temp 97.2 °F (36.2 °C) (Axillary)   Resp 18   Ht 5' 9\" (1.753 m)   Wt 221 lb 3.2 oz (100.3 kg)   SpO2 99%   BMI 32.67 kg/m²     General appearance: No apparent distress, appears stated age and cooperative. HEENT: Pupils equal, round, and reactive to light. Conjunctivae/corneas clear. Neck: Supple, with full range of motion. No jugular venous distention. Trachea midline. Respiratory:  Normal respiratory effort. Clear to auscultation, bilaterally without Rales/Wheezes/Rhonchi. Cardiovascular: Regular rate and rhythm with normal S1/S2 without murmurs, rubs or gallops. Abdomen: Soft, non-tender, non-distended with normal bowel sounds.   Musculoskeletal: No clubbing, cyanosis or edema bilaterally. Full range of motion without deformity. Skin: Skin color, texture, turgor normal.  No rashes or lesions. Neurologic:  Neurovascularly intact without any focal sensory/motor deficits. Cranial nerves: II-XII intact, grossly non-focal.  Psychiatric: Alert and oriented, thought content appropriate, normal insight  Capillary Refill: Brisk,< 3 seconds   Peripheral Pulses: +2 palpable, equal bilaterally       Labs:   Recent Labs     02/01/23 0455 02/02/23 0455 02/03/23  0502   WBC 8.7 8.2 9.9   HGB 8.5* 8.4* 8.6*   HCT 25.6* 25.4* 26.3*    302 315       Recent Labs     02/01/23 0455 02/02/23 0455 02/03/23  0502   * 128* 130*   K 4.9 4.3 4.8   CL 90* 92* 90*   CO2 20* 23 22   BUN 84* 51* 70*   CREATININE 8.7* 6.0* 7.7*   CALCIUM 8.2* 8.4 8.9   PHOS 8.5* 5.5* 7.3*       No results for input(s): AST, ALT, BILIDIR, BILITOT, ALKPHOS in the last 72 hours. Recent Labs     02/01/23  1421   INR 1.15*       No results for input(s): Daimichelle Michel in the last 72 hours.       Urinalysis:      Lab Results   Component Value Date/Time    NITRU Negative 12/25/2022 05:00 AM    WBCUA 6-9 12/25/2022 05:00 AM    BACTERIA Rare 12/25/2022 05:00 AM    RBCUA 3-4 12/25/2022 05:00 AM    BLOODU SMALL 12/25/2022 05:00 AM    SPECGRAV 1.020 12/25/2022 05:00 AM    GLUCOSEU 500 12/25/2022 05:00 AM       Consults:    IP CONSULT TO HOSPITALIST  IP CONSULT TO NEPHROLOGY  IP CONSULT TO HEART FAILURE NURSE/COORDINATOR  IP CONSULT TO DIETITIAN      Assessment/Plan:    Active Hospital Problems    Diagnosis     Ischemic cardiomyopathy [I25.5]      Priority: High    Dyslipidemia [E78.5]      Priority: High    DM2 (diabetes mellitus, type 2) ([facility] [E11.9]      Priority: High    Hypertensive urgency [I16.0]      Priority: Medium    Acute respiratory failure with hypoxemia (HCC) [J96.01]      Priority: Medium    HFrEF (heart failure with reduced ejection fraction) [facility] [I50.20]      Priority: Medium Asthma-COPD overlap syndrome ([facility] [J44.9]      Priority: Medium    PAF (paroxysmal atrial fibrillation) ([facility]) [I48.0]     ESRD (end stage renal disease) ([facility] [N18.6]     ZAINAB on CPAP [G47.33, Z99.89]          Acute on chronic respiratory failure with hypoxemia  Etiology is obscure, clinically he could have been in COPD exacerbation with reported wheezing on the field, however when I examined him his lungs are clear  -BiPAP initiated by squad, continued and ER and ICU. After a few hours we were able to wean him off to nasal cannula oxygen. Currently on 3 LPM  -CT did not show any significant pulmonary edema or pleural effusions    Hypertensive urgency  -Started on nitro drip in the ED and now weaned off  -Continue metoprolol, Isordil     ESRD -  on HD M/W/F. Nephrology consulted. Patient reports compliance with HD treatment      HTN/CAD - w/ known CAD but no evidence of active signs/sxs of ischemia/failure. Currently controlled on home meds w/ vitals reviewed and documented as above. Troponin elevation - chronic elevation due to ESRD status. No ischemic changes on EKG. No anginal symptoms     CHF - chronic systolic failure w/ reduced EF 25-30% by Echo dated Dec 2022. Likely due to ischemic and hypertensive heart disease. Patient is euvolemic w/ no evidence of acute decompensation. Continue current medical mgt. Afib - chronic paroxysmal of unspecified and clinically unable to determine etiology. Normally rate controlled on BBlocker - continued. Anticoagulated at baseline on home Eliquis due to secondary hypercoaguable state due to Afib - continued. Monitored on tele. Obesity -  With Body mass index is 33.52 kg/m². Complicating assessment and treatment. Placing patient at risk for multiple co-morbidities as well as early death and contributing to the patient's presentation. Counseled on weight loss. ZAINAB - likely 2nd to obesity.   Controlled on home CPAP/BiPap - continued, w/ outpatient f/u as previously arranged. DVT Prophylaxis: Eliquis/IPC       Recent Labs     02/01/23  0455 02/02/23  0455 02/03/23  0502    302 315       Diet: ADULT DIET; Regular; Low Sodium (2 gm); Low Potassium (Less than 3000 mg/day); Low Phosphorus (Less than 1000 mg); 1200 ml  ADULT ORAL NUTRITION SUPPLEMENT; Breakfast, Dinner; Renal Oral Supplement  Code Status: Full Code       PT/OT Eval Status: not yet ordered.       Dispo - Patient is likely to remain in-house at least until Sat/Sunday 4/5 Feb pending clinical course and subspecialty Noe García MD

## 2023-02-03 NOTE — PROGRESS NOTES
02/03/23 0023   NIV Type   $NIV $Daily Charge   Equipment Type v60   Mode Bilevel   Mask Type Full face mask   Mask Size Medium   Settings/Measurements   PIP Observed 14 cm H20   IPAP 14 cmH20   CPAP/EPAP 6 cmH2O   Vt (Measured) 655 mL   Rate Ordered 8   Resp 19   FiO2  30 %   I Time/ I Time % 1 s   Minute Volume (L/min) 12.6 Liters   Mask Leak (lpm) 63 lpm   Comfort Level Good   Using Accessory Muscles No   SpO2 98   Patient's Home Machine No   Alarm Settings   Alarms On Y   Low Pressure (cmH2O) 6 cmH2O   High Pressure (cmH2O) 30 cmH2O   Apnea (secs) 20 secs   RR Low (bpm) 6   RR High (bpm) 40 br/min

## 2023-02-04 LAB
ANION GAP SERPL CALCULATED.3IONS-SCNC: 12 MMOL/L (ref 3–16)
BUN BLDV-MCNC: 46 MG/DL (ref 7–20)
CALCIUM SERPL-MCNC: 9.4 MG/DL (ref 8.3–10.6)
CHLORIDE BLD-SCNC: 90 MMOL/L (ref 99–110)
CO2: 27 MMOL/L (ref 21–32)
CREAT SERPL-MCNC: 5.2 MG/DL (ref 0.9–1.3)
GFR SERPL CREATININE-BSD FRML MDRD: 12 ML/MIN/{1.73_M2}
GLUCOSE BLD-MCNC: 228 MG/DL (ref 70–99)
HCT VFR BLD CALC: 27.3 % (ref 40.5–52.5)
HEMOGLOBIN: 8.9 G/DL (ref 13.5–17.5)
MAGNESIUM: 2.2 MG/DL (ref 1.8–2.4)
MCH RBC QN AUTO: 30.1 PG (ref 26–34)
MCHC RBC AUTO-ENTMCNC: 32.8 G/DL (ref 31–36)
MCV RBC AUTO: 91.8 FL (ref 80–100)
PDW BLD-RTO: 16.9 % (ref 12.4–15.4)
PLATELET # BLD: 308 K/UL (ref 135–450)
PMV BLD AUTO: 7.7 FL (ref 5–10.5)
POTASSIUM SERPL-SCNC: 4.5 MMOL/L (ref 3.5–5.1)
RBC # BLD: 2.97 M/UL (ref 4.2–5.9)
SODIUM BLD-SCNC: 129 MMOL/L (ref 136–145)
WBC # BLD: 9.2 K/UL (ref 4–11)

## 2023-02-04 PROCEDURE — 36415 COLL VENOUS BLD VENIPUNCTURE: CPT

## 2023-02-04 PROCEDURE — 2700000000 HC OXYGEN THERAPY PER DAY

## 2023-02-04 PROCEDURE — 94761 N-INVAS EAR/PLS OXIMETRY MLT: CPT

## 2023-02-04 PROCEDURE — 2580000003 HC RX 258: Performed by: INTERNAL MEDICINE

## 2023-02-04 PROCEDURE — 83735 ASSAY OF MAGNESIUM: CPT

## 2023-02-04 PROCEDURE — 85027 COMPLETE CBC AUTOMATED: CPT

## 2023-02-04 PROCEDURE — 2060000000 HC ICU INTERMEDIATE R&B

## 2023-02-04 PROCEDURE — 90935 HEMODIALYSIS ONE EVALUATION: CPT

## 2023-02-04 PROCEDURE — 2500000003 HC RX 250 WO HCPCS: Performed by: INTERNAL MEDICINE

## 2023-02-04 PROCEDURE — 6370000000 HC RX 637 (ALT 250 FOR IP): Performed by: INTERNAL MEDICINE

## 2023-02-04 PROCEDURE — 6360000002 HC RX W HCPCS: Performed by: INTERNAL MEDICINE

## 2023-02-04 PROCEDURE — 80048 BASIC METABOLIC PNL TOTAL CA: CPT

## 2023-02-04 PROCEDURE — 94660 CPAP INITIATION&MGMT: CPT

## 2023-02-04 RX ORDER — HEPARIN SODIUM 1000 [USP'U]/ML
INJECTION, SOLUTION INTRAVENOUS; SUBCUTANEOUS
Status: DISPENSED
Start: 2023-02-04 | End: 2023-02-05

## 2023-02-04 RX ADMIN — QUETIAPINE FUMARATE 50 MG: 25 TABLET ORAL at 20:49

## 2023-02-04 RX ADMIN — SODIUM CHLORIDE, PRESERVATIVE FREE 10 ML: 5 INJECTION INTRAVENOUS at 20:49

## 2023-02-04 RX ADMIN — METOPROLOL SUCCINATE 100 MG: 50 TABLET, EXTENDED RELEASE ORAL at 11:51

## 2023-02-04 RX ADMIN — APIXABAN 2.5 MG: 2.5 TABLET, FILM COATED ORAL at 11:51

## 2023-02-04 RX ADMIN — PANTOPRAZOLE SODIUM 40 MG: 40 TABLET, DELAYED RELEASE ORAL at 05:37

## 2023-02-04 RX ADMIN — APIXABAN 2.5 MG: 2.5 TABLET, FILM COATED ORAL at 20:49

## 2023-02-04 RX ADMIN — METOPROLOL SUCCINATE 100 MG: 50 TABLET, EXTENDED RELEASE ORAL at 20:49

## 2023-02-04 RX ADMIN — GABAPENTIN 200 MG: 100 CAPSULE ORAL at 20:49

## 2023-02-04 RX ADMIN — PRAVASTATIN SODIUM 40 MG: 40 TABLET ORAL at 11:51

## 2023-02-04 RX ADMIN — HEPARIN SODIUM 3600 UNITS: 1000 INJECTION INTRAVENOUS; SUBCUTANEOUS at 15:09

## 2023-02-04 RX ADMIN — CLOPIDOGREL BISULFATE 75 MG: 75 TABLET ORAL at 11:51

## 2023-02-04 RX ADMIN — DULOXETINE HYDROCHLORIDE 30 MG: 30 CAPSULE, DELAYED RELEASE ORAL at 11:51

## 2023-02-04 RX ADMIN — GABAPENTIN 200 MG: 100 CAPSULE ORAL at 16:22

## 2023-02-04 RX ADMIN — ISOSORBIDE DINITRATE 20 MG: 20 TABLET ORAL at 20:49

## 2023-02-04 RX ADMIN — ISOSORBIDE DINITRATE 20 MG: 20 TABLET ORAL at 14:43

## 2023-02-04 RX ADMIN — CALCIUM ACETATE 667 MG: 667 CAPSULE ORAL at 16:22

## 2023-02-04 ASSESSMENT — PAIN DESCRIPTION - LOCATION: LOCATION: BACK

## 2023-02-04 ASSESSMENT — PAIN SCALES - GENERAL
PAINLEVEL_OUTOF10: 6
PAINLEVEL_OUTOF10: 0

## 2023-02-04 NOTE — PROGRESS NOTES
02/04/23 0500   NIV Type   Equipment Type V60   Mode Bilevel   Mask Type Full face mask   Mask Size Medium   Settings/Measurements   PIP Observed 15 cm H20   IPAP 14 cmH20   CPAP/EPAP 6 cmH2O   Vt (Measured) 380 mL   Resp 17   FiO2  30 %   Minute Volume (L/min) 6 Liters   Mask Leak (lpm) 40 lpm   Comfort Level Good   Using Accessory Muscles No   Patient's Home Machine No

## 2023-02-04 NOTE — PROGRESS NOTES
[facility] [employer]  Nephrology Follow up Note           Reason for Consult: ESRD  Requesting Physician:  Dr. Santo Ross history  Seen on IU. Orders confirmed. Pre-weight at [facility] today 101.8 kg. States shortness of breath better. HD on 2/3 with 1 L UF, post-weight of  99.3 kg; pt felt light headed when tried to challenge his TW  UF w 4l over 2.5h on 2/3; post wt 99.6 kg   HD on 2/1 c/b by catheter dysfunction ultimately required exchange, had ~4l net UF over 3h, post wt 104.5 kg   HD on 1/30 w 2.5l UF over 2h as the system clotted twice during HD , post wt 101 kg     ROS: No fever  PSFH: No visitor    Scheduled Meds:   apixaban  2.5 mg Oral BID    calcium acetate  1 capsule Oral TID WC    clopidogrel  75 mg Oral Daily    DULoxetine  30 mg Oral Daily    isosorbide dinitrate  20 mg Oral TID    metoprolol succinate  100 mg Oral BID    pantoprazole  40 mg Oral QAM AC    pravastatin  40 mg Oral Daily    QUEtiapine  50 mg Oral Nightly    sodium chloride flush  5-40 mL IntraVENous 2 times per day    epoetin siddharth-epbx  10,000 Units IntraVENous Q MWF    calcitRIOL  1.25 mcg Oral Q MWF     Continuous Infusions:   sodium chloride       PRN Meds:.heparin (porcine), heparin (porcine), calcium carbonate, gabapentin, sodium chloride flush, sodium chloride, ondansetron **OR** ondansetron, polyethylene glycol, acetaminophen **OR** acetaminophen, ipratropium-albuterol, sennosides-docusate sodium, docusate sodium, loperamide    History of Present Illness on 1/30/2023:    45 y.o. yo male with PMH of ESRD, CHF on baseline home oxygen at 3 L, s/p TAVR, CAD, HTN, DM2, asthma-COPD who is admitted for increase shortness of breath  Patient reports that he has been feeling short of breath since Friday. He completed his dialysis for still felt short of breath. He was needing up to 5 to 6 L of oxygen at home.   He presented to the emergency room with increasing shortness of breath initially requiring BiPAP with 3 L of oxygen and has been admitted to ICU. Physical exam:   Constitutional:  VITALS:  BP (!) 145/83   Pulse 60   Temp 97.8 °F (36.6 °C)   Resp 20   Ht 5' 9\" (1.753 m)   Wt 224 lb 6.9 oz (101.8 kg)   SpO2 96%   BMI 33.14 kg/m²   Gen: alert, awake  Neck: No JVD  Skin: Unremarkable  Cardiovascular:  S1, S2 without m/r/g   Respiratory: CTA B without w/r/r; respiratory effort normal  Abdomen:  soft, nt, nd,   Extremities: no lower extremity edema  Neuro/Psy: AAoriented times 3 ; moves all 4 ext    Data/  Recent Labs     02/02/23 0455 02/03/23  0502 02/04/23 0457   WBC 8.2 9.9 9.2   HGB 8.4* 8.6* 8.9*   HCT 25.4* 26.3* 27.3*   MCV 93.3 93.6 91.8    315 308       Recent Labs     02/02/23 0455 02/03/23  0502 02/04/23 0457   * 130* 129*   K 4.3 4.8 4.5   CL 92* 90* 90*   CO2 23 22 27   GLUCOSE 205* 149* 228*   PHOS 5.5* 7.3*  --    MG 2.00 2.20 2.20   BUN 51* 70* 46*   CREATININE 6.0* 7.7* 5.2*   LABGLOM 10* 8* 12*         Assessment  -ESRD on HD Monday Wednesday Friday  -Acute on chronic respiratory failure  -Hypertensive urgency on nitro drip, has been weaned off this morning during my rounds  -CKD/MBD  -Hyperkalemia  -Anemia    Plan  -HD as per F schedule   Target weight 99.5 kg   Extra IUF session on 2/4 to challenge TW   CXR after HD on 2/3 with continued pulmonary edema  -DAVON 10K units as ordered qMWF  -Calcitriol 1.25 mcg Monday Wednesday Friday  -serial labs  -renal diet    Thank you for the consultation. Please do not hesitate to call with questions. Mir Butts MD  Office: [phone]  Fax:    [phone]  [facility]. [facility]

## 2023-02-04 NOTE — PROGRESS NOTES
02/04/23 1116   NIV Type   NIV Started/Stopped On   Equipment Type V60   Mode Bilevel   Mask Type Full face mask   Mask Size Medium   Settings/Measurements   IPAP 14 cmH20   CPAP/EPAP 6 cmH2O   Vt (Measured) 406 mL   Rate Ordered 8   Resp 16   FiO2  30 %   Minute Volume (L/min) 7.3 Liters   Mask Leak (lpm) 46 lpm   Comfort Level Good   Using Accessory Muscles No Broderick Gomez  8/15/1928    11/28/2018  Chief Complaint   Patient presents with   • GI Problem     Problems swallowing     Subjective   HPI  Broderick Gomez is a 89 y.o. male who presents with a complaint of difficulty swallowing. He says that he has most difficulty with meat and breads it is located in the upper esophageal region and is more persistent over the past few months. It was with every meal for a few weeks kaya with cornbread. No nausea or vomiting. No wt loss. No melena. He has not had dilatation in the past. No fever chills or sweats.    Past Medical History:   Diagnosis Date   • Acute right lumbar radiculopathy    • Ataxia    • Atrial fibrillation (CMS/HCC)    • Bilateral edema of lower extremity    • BPPV (benign paroxysmal positional vertigo), bilateral    • Carotid occlusion, bilateral    • Cerebral microvascular disease    • CVA (cerebral vascular accident) (CMS/HCC)    • Elevated PSA    • Hypercholesteremia    • Hypertension    • Inguinal hernia    • Rhinophyma    • Sinoatrial node dysfunction (CMS/HCC)    • Thrombosis of thoracic aorta (CMS/HCC)    • Total bilirubin, elevated    • TSH elevation      Past Surgical History:   Procedure Laterality Date   • CATARACT EXTRACTION, BILATERAL     • COLONOSCOPY  05/16/2012    adenomatous polyp @ cecum, hepatic flexure, 40cm, and 35cm, multiple diverticula in sigmoid and descending colon, recall 3 years, Dr. Del Castillo   • EYE SURGERY      eyelid replacement   • HEMORRHOIDECTOMY     • INGUINAL HERNIA REPAIR Right 2008   • KNEE SURGERY Right    • ROTATOR CUFF REPAIR Right 2000   • SKIN CANCER EXCISION       Outpatient Medications Marked as Taking for the 11/28/18 encounter (Office Visit) with Tianna Gonzalez APRN   Medication Sig Dispense Refill   • amLODIPine (NORVASC) 5 MG tablet Take 5 mg by mouth Daily.     • aspirin 325 MG tablet Take 325 mg by mouth Daily.     • CloNIDine (CATAPRES) 0.1 MG tablet Take 0.1 mg by mouth 3 (Three) Times a Day  As Needed for High Blood Pressure.     • furosemide (LASIX) 20 MG tablet Take 20 mg by mouth Daily.     • meclizine (ANTIVERT) 25 MG tablet Take 25 mg by mouth 2 (Two) Times a Day As Needed for dizziness.     • meloxicam (MOBIC) 15 MG tablet Take 15 mg by mouth Daily.     • metoprolol succinate XL (TOPROL-XL) 50 MG 24 hr tablet Take 50 mg by mouth 2 (Two) Times a Day.     • sildenafil (VIAGRA) 100 MG tablet Take 50 mg by mouth Daily As Needed for erectile dysfunction.       No Known Allergies  Social History     Socioeconomic History   • Marital status:      Spouse name: Not on file   • Number of children: Not on file   • Years of education: Not on file   • Highest education level: Not on file   Social Needs   • Financial resource strain: Not on file   • Food insecurity - worry: Not on file   • Food insecurity - inability: Not on file   • Transportation needs - medical: Not on file   • Transportation needs - non-medical: Not on file   Occupational History   • Not on file   Tobacco Use   • Smoking status: Never Smoker   • Smokeless tobacco: Current User     Types: Snuff   Substance and Sexual Activity   • Alcohol use: Yes     Comment: occasional   • Drug use: No   • Sexual activity: Not on file   Other Topics Concern   • Not on file   Social History Narrative   • Not on file     Family History   Problem Relation Age of Onset   • Colon cancer Neg Hx    • Colon polyps Neg Hx      Health Maintenance   Topic Date Due   • ANNUAL PHYSICAL  08/15/1931   • TDAP/TD VACCINES (1 - Tdap) 08/[birth year]/1945   • ZOSTER VACCINE (1 of 2) 08/15/1976   • PNEUMOCOCCAL VACCINES (65+ LOW/MEDIUM RISK) (1 of 2 - PCV13) 08/15/1991   • INFLUENZA VACCINE  08/01/2018   • LIPID PANEL  11/21/2018     Review of Systems   Constitutional: Negative for activity change, appetite change, chills, diaphoresis, fatigue, fever and unexpected weight change.   HENT: Negative for ear pain, hearing loss, mouth sores, sore throat, trouble swallowing and voice  "change.    Eyes: Negative.    Respiratory: Negative for cough, choking, shortness of breath and wheezing.    Cardiovascular: Negative for chest pain and palpitations.   Gastrointestinal: Negative for abdominal pain, blood in stool, constipation, diarrhea, nausea and vomiting.   Endocrine: Negative for cold intolerance and heat intolerance.   Genitourinary: Negative for decreased urine volume, dysuria, frequency, hematuria and urgency.   Musculoskeletal: Negative for back pain, gait problem and myalgias.   Skin: Negative for color change, pallor and rash.   Allergic/Immunologic: Negative for food allergies and immunocompromised state.   Neurological: Negative for dizziness, tremors, seizures, syncope, weakness, light-headedness, numbness and headaches.   Hematological: Negative for adenopathy. Does not bruise/bleed easily.   Psychiatric/Behavioral: Negative for agitation and confusion. The patient is not nervous/anxious.    All other systems reviewed and are negative.    Objective   Vitals:    11/28/18 0952   BP: 130/82   Pulse: 87   SpO2: 97%   Weight: 81.5 kg (179 lb 9.6 oz)   Height: 182.9 cm (72\")     Body mass index is 24.36 kg/m².  Physical Exam   Constitutional: He is oriented to person, place, and time. He appears well-developed and well-nourished.   HENT:   Head: Normocephalic and atraumatic.   Eyes: Pupils are equal, round, and reactive to light.   Neck: Normal range of motion. Neck supple. No tracheal deviation present.   Cardiovascular: Normal rate, regular rhythm and normal heart sounds. Exam reveals no gallop and no friction rub.   No murmur heard.  Pulmonary/Chest: Effort normal and breath sounds normal. No respiratory distress. He has no wheezes. He has no rales. He exhibits no tenderness.   Abdominal: Soft. Bowel sounds are normal. He exhibits no distension. There is no hepatosplenomegaly. There is no tenderness. There is no rigidity, no rebound and no guarding.   Musculoskeletal: Normal range of " motion. He exhibits no edema, tenderness or deformity.   Neurological: He is alert and oriented to person, place, and time. He has normal reflexes.   Skin: Skin is warm and dry. No rash noted. No pallor.   Psychiatric: He has a normal mood and affect. His behavior is normal. Judgment and thought content normal.     Assessment/Plan   Broderick was seen today for gi problem.    Diagnoses and all orders for this visit:    Esophageal dysphagia  -     Case Request; Standing  -     Implement Anesthesia Orders Day of Procedure; Standing  -     Obtain Informed Consent; Standing  -     Case Request    HTN (hypertension), benign  Comments:  cont BP medication the day of procedure      ESOPHAGOGASTRODUODENOSCOPY WITH ANESTHESIA (N/A)  EMR Dragon/transcription disclaimer: Much of this encounter note is electronic transcription/translation of spoken language to printed text. The electronic translation of spoken language may be erroneous, or at times, nonsensical words or phrases may be inadvertently transcribed. Although I have reviewed the note for such errors, some may still exist.  Body mass index is 24.36 kg/m².  No Follow-up on file.    Patient's Body mass index is 24.36 kg/m². BMI is within normal parameters. No follow-up required.      All risks, benefits, alternatives, and indications of colonoscopy and/or Endoscopy procedure have been discussed with the patient. Risks to include perforation of the colon requiring possible surgery or colostomy, risk of bleeding from biopsies or removal of colon tissue, possibility of missing a colon polyp or cancer, or adverse drug reaction.  Benefits to include the diagnosis and management of disease of the colon and rectum. Alternatives to include barium enema, radiographic evaluation, lab testing or no intervention. Pt verbalizes understanding and agrees.     Tianna Gonzalez, APRN  11/28/2018  10:20 AM      Obesity, Adult  Obesity is the condition of having too much total body fat.  Being overweight or obese means that your weight is greater than what is considered healthy for your body size. Obesity is determined by a measurement called BMI. BMI is an estimate of body fat and is calculated from height and weight. For adults, a BMI of 30 or higher is considered obese.  Obesity can eventually lead to other health concerns and major illnesses, including:  · Stroke.  · Coronary artery disease (CAD).  · Type 2 diabetes.  · Some types of cancer, including cancers of the colon, breast, uterus, and gallbladder.  · Osteoarthritis.  · High blood pressure (hypertension).  · High cholesterol.  · Sleep apnea.  · Gallbladder stones.  · Infertility problems.  What are the causes?  The main cause of obesity is taking in (consuming) more calories than your body uses for energy. Other factors that contribute to this condition may include:  · Being born with genes that make you more likely to become obese.  · Having a medical condition that causes obesity. These conditions include:  ¨ Hypothyroidism.  ¨ Polycystic ovarian syndrome (PCOS).  ¨ Binge-eating disorder.  ¨ Cushing syndrome.  · Taking certain medicines, such as steroids, antidepressants, and seizure medicines.  · Not being physically active (sedentary lifestyle).  · Living where there are limited places to exercise safely or buy healthy foods.  · Not getting enough sleep.  What increases the risk?  The following factors may increase your risk of this condition:  · Having a family history of obesity.  · Being a woman of -American descent.  · Being a man of  descent.  What are the signs or symptoms?  Having excessive body fat is the main symptom of this condition.  How is this diagnosed?  This condition may be diagnosed based on:  · Your symptoms.  · Your medical history.  · A physical exam. Your health care provider may measure:  ¨ Your BMI. If you are an adult with a BMI between 25 and less than 30, you are considered overweight. If you  are an adult with a BMI of 30 or higher, you are considered obese.  ¨ The distances around your hips and your waist (circumferences). These may be compared to each other to help diagnose your condition.  ¨ Your skinfold thickness. Your health care provider may gently pinch a fold of your skin and measure it.  How is this treated?  Treatment for this condition often includes changing your lifestyle. Treatment may include some or all of the following:  · Dietary changes. Work with your health care provider and a dietitian to set a weight-loss goal that is healthy and reasonable for you. Dietary changes may include eating:  ¨ Smaller portions. A portion size is the amount of a particular food that is healthy for you to eat at one time. This varies from person to person.  ¨ Low-calorie or low-fat options.  ¨ More whole grains, fruits, and vegetables.  · Regular physical activity. This may include aerobic activity (cardio) and strength training.  · Medicine to help you lose weight. Your health care provider may prescribe medicine if you are unable to lose 1 pound a week after 6 weeks of eating more healthily and doing more physical activity.  · Surgery. Surgical options may include gastric banding and gastric bypass. Surgery may be done if:  ¨ Other treatments have not helped to improve your condition.  ¨ You have a BMI of 40 or higher.  ¨ You have life-threatening health problems related to obesity.  Follow these instructions at home:     Eating and drinking     · Follow recommendations from your health care provider about what you eat and drink. Your health care provider may advise you to:  ¨ Limit fast foods, sweets, and processed snack foods.  ¨ Choose low-fat options, such as low-fat milk instead of whole milk.  ¨ Eat 5 or more servings of fruits or vegetables every day.  ¨ Eat at home more often. This gives you more control over what you eat.  ¨ Choose healthy foods when you eat out.  ¨ Learn what a healthy portion  size is.  ¨ Keep low-fat snacks on hand.  ¨ Avoid sugary drinks, such as soda, fruit juice, iced tea sweetened with sugar, and flavored milk.  ¨ Eat a healthy breakfast.  · Drink enough water to keep your urine clear or pale yellow.  · Do not go without eating for long periods of time (do not fast) or follow a fad diet. Fasting and fad diets can be unhealthy and even dangerous.  Physical Activity   · Exercise regularly, as told by your health care provider. Ask your health care provider what types of exercise are safe for you and how often you should exercise.  · Warm up and stretch before being active.  · Cool down and stretch after being active.  · Rest between periods of activity.  Lifestyle   · Limit the time that you spend in front of your TV, computer, or video game system.  · Find ways to reward yourself that do not involve food.  · Limit alcohol intake to no more than 1 drink a day for nonpregnant women and 2 drinks a day for men. One drink equals 12 oz of beer, 5 oz of wine, or 1½ oz of hard liquor.  General instructions   · Keep a weight loss journal to keep track of the food you eat and how much you exercise you get.  · Take over-the-counter and prescription medicines only as told by your health care provider.  · Take vitamins and supplements only as told by your health care provider.  · Consider joining a support group. Your health care provider may be able to recommend a support group.  · Keep all follow-up visits as told by your health care provider. This is important.  Contact a health care provider if:  · You are unable to meet your weight loss goal after 6 weeks of dietary and lifestyle changes.  This information is not intended to replace advice given to you by your health care provider. Make sure you discuss any questions you have with your health care provider.  Document Released: 01/25/2006 Document Revised: 05/22/2017 Document Reviewed: 10/05/2016  Elsevier Interactive Patient Education © 2017  [employer]      If you smoke or use tobacco, 4 minutes reading provided  Steps to Quit Smoking  Smoking tobacco can be harmful to your health and can affect almost every organ in your body. Smoking puts you, and those around you, at risk for developing many serious chronic diseases. Quitting smoking is difficult, but it is one of the best things that you can do for your health. It is never too late to quit.  What are the benefits of quitting smoking?  When you quit smoking, you lower your risk of developing serious diseases and conditions, such as:  · Lung cancer or lung disease, such as COPD.  · Heart disease.  · Stroke.  · Heart attack.  · Infertility.  · Osteoporosis and bone fractures.  Additionally, symptoms such as coughing, wheezing, and shortness of breath may get better when you quit. You may also find that you get sick less often because your body is stronger at fighting off colds and infections. If you are pregnant, quitting smoking can help to reduce your chances of having a baby of low birth weight.  How do I get ready to quit?  When you decide to quit smoking, create a plan to make sure that you are successful. Before you quit:  · Pick a date to quit. Set a date within the next two weeks to give you time to prepare.  · Write down the reasons why you are quitting. Keep this list in places where you will see it often, such as on your bathroom mirror or in your car or wallet.  · Identify the people, places, things, and activities that make you want to smoke (triggers) and avoid them. Make sure to take these actions:  ¨ Throw away all cigarettes at home, at work, and in your car.  ¨ Throw away smoking accessories, such as ashtrays and lighters.  ¨ Clean your car and make sure to empty the ashtray.  ¨ Clean your home, including curtains and carpets.  · Tell your family, friends, and coworkers that you are quitting. Support from your loved ones can make quitting easier.  · Talk with your health care  provider about your options for quitting smoking.  · Find out what treatment options are covered by your health insurance.  What strategies can I use to quit smoking?  Talk with your healthcare provider about different strategies to quit smoking. Some strategies include:  · Quitting smoking altogether instead of gradually lessening how much you smoke over a period of time. Research shows that quitting “cold turkey” is more successful than gradually quitting.  · Attending in-person counseling to help you build problem-solving skills. You are more likely to have success in quitting if you attend several counseling sessions. Even short sessions of 10 minutes can be effective.  · Finding resources and support systems that can help you to quit smoking and remain smoke-free after you quit. These resources are most helpful when you use them often. They can include:  ¨ Online chats with a counselor.  ¨ Telephone quitlines.  ¨ Printed self-help materials.  ¨ Support groups or group counseling.  ¨ Text messaging programs.  ¨ Mobile phone applications.  · Taking medicines to help you quit smoking. (If you are pregnant or breastfeeding, talk with your health care provider first.) Some medicines contain nicotine and some do not. Both types of medicines help with cravings, but the medicines that include nicotine help to relieve withdrawal symptoms. Your health care provider may recommend:  ¨ Nicotine patches, gum, or lozenges.  ¨ Nicotine inhalers or sprays.  ¨ Non-nicotine medicine that is taken by mouth.  Talk with your health care provider about combining strategies, such as taking medicines while you are also receiving in-person counseling. Using these two strategies together makes you more likely to succeed in quitting than if you used either strategy on its own.  If you are pregnant or breastfeeding, talk with your health care provider about finding counseling or other support strategies to quit smoking. Do not take  medicine to help you quit smoking unless told to do so by your health care provider.  What things can I do to make it easier to quit?  Quitting smoking might feel overwhelming at first, but there is a lot that you can do to make it easier. Take these important actions:  · Reach out to your family and friends and ask that they support and encourage you during this time. Call telephone quitlines, reach out to support groups, or work with a counselor for support.  · Ask people who smoke to avoid smoking around you.  · Avoid places that trigger you to smoke, such as bars, parties, or smoke-break areas at work.  · Spend time around people who do not smoke.  · Lessen stress in your life, because stress can be a smoking trigger for some people. To lessen stress, try:  ¨ Exercising regularly.  ¨ Deep-breathing exercises.  ¨ Yoga.  ¨ Meditating.  ¨ Performing a body scan. This involves closing your eyes, scanning your body from head to toe, and noticing which parts of your body are particularly tense. Purposefully relax the muscles in those areas.  · Download or purchase mobile phone or tablet apps (applications) that can help you stick to your quit plan by providing reminders, tips, and encouragement. There are many free apps, such as QuitGuide from the CDC (Centers for Disease Control and Prevention). You can find other support for quitting smoking (smoking cessation) through smokefree.gov and other websites.  How will I feel when I quit smoking?  Within the first 24 hours of quitting smoking, you may start to feel some withdrawal symptoms. These symptoms are usually most noticeable 2-3 days after quitting, but they usually do not last beyond 2-3 weeks. Changes or symptoms that you might experience include:  · Mood swings.  · Restlessness, anxiety, or irritation.  · Difficulty concentrating.  · Dizziness.  · Strong cravings for sugary foods in addition to nicotine.  · Mild weight  gain.  · Constipation.  · Nausea.  · Coughing or a sore throat.  · Changes in how your medicines work in your body.  · A depressed mood.  · Difficulty sleeping (insomnia).  After the first 2-3 weeks of quitting, you may start to notice more positive results, such as:  · Improved sense of smell and taste.  · Decreased coughing and sore throat.  · Slower heart rate.  · Lower blood pressure.  · Clearer skin.  · The ability to breathe more easily.  · Fewer sick days.  Quitting smoking is very challenging for most people. Do not get discouraged if you are not successful the first time. Some people need to make many attempts to quit before they achieve long-term success. Do your best to stick to your quit plan, and talk with your health care provider if you have any questions or concerns.  This information is not intended to replace advice given to you by your health care provider. Make sure you discuss any questions you have with your health care provider.  Document Released: 12/12/2002 Document Revised: 08/15/2017 Document Reviewed: 05/03/2016  Elsevier Interactive Patient Education © 2017 [employer]

## 2023-02-04 NOTE — PROGRESS NOTES
02/03/23 2209   NIV Type   Skin Assessment Clean, dry, & intact   Skin Protection for O2 Device No  (pt refused)   NIV Started/Stopped On   Equipment Type v60   Mode Bilevel   Mask Type Full face mask   Mask Size Medium   Settings/Measurements   PIP Observed 18 cm H20   IPAP 14 cmH20   CPAP/EPAP 6 cmH2O   Vt (Measured) 544 mL   Rate Ordered 8   Resp 18   FiO2  30 %   I Time/ I Time % 1 s   Minute Volume (L/min) 11.2 Liters   Mask Leak (lpm) 20 lpm   Comfort Level Good   Using Accessory Muscles No   SpO2 97   Alarm Settings   Alarms On Y   Low Pressure (cmH2O) 6 cmH2O   High Pressure (cmH2O) 30 cmH2O   Apnea (secs) 20 secs   RR Low (bpm) 6   RR High (bpm) 40 br/min

## 2023-02-04 NOTE — PROGRESS NOTES
02/04/23 0002   NIV Type   $NIV $Daily Charge   Equipment Type V60   Mode Bilevel   Mask Type Full face mask   Mask Size Medium   Settings/Measurements   PIP Observed 14 cm H20   IPAP 14 cmH20   CPAP/EPAP 6 cmH2O   Vt (Measured) 520 mL   Resp 18   FiO2  30 %   Minute Volume (L/min) 10 Liters   Mask Leak (lpm) 37 lpm   Comfort Level Good   Using Accessory Muscles No   Patient's Home Machine No

## 2023-02-04 NOTE — PROGRESS NOTES
Hospitalist Progress Note      PCP: Anita Schroeder MD    Date of Admission: 1/29/2023    Chief Complaint: SOB    Subjective: no new c/o. Medications:  Reviewed    Infusion Medications    sodium chloride       Scheduled Medications    apixaban  2.5 mg Oral BID    calcium acetate  1 capsule Oral TID WC    clopidogrel  75 mg Oral Daily    DULoxetine  30 mg Oral Daily    isosorbide dinitrate  20 mg Oral TID    metoprolol succinate  100 mg Oral BID    pantoprazole  40 mg Oral QAM AC    pravastatin  40 mg Oral Daily    QUEtiapine  50 mg Oral Nightly    sodium chloride flush  5-40 mL IntraVENous 2 times per day    epoetin siddharth-epbx  10,000 Units IntraVENous Q MWF    calcitRIOL  1.25 mcg Oral Q MWF     PRN Meds: heparin (porcine), heparin (porcine), calcium carbonate, gabapentin, sodium chloride flush, sodium chloride, ondansetron **OR** ondansetron, polyethylene glycol, acetaminophen **OR** acetaminophen, ipratropium-albuterol, sennosides-docusate sodium, docusate sodium, loperamide      Intake/Output Summary (Last 24 hours) at 2/4/2023 1037  Last data filed at 2/4/2023 0545  Gross per 24 hour   Intake 490 ml   Output 0 ml   Net 490 ml         Physical Exam Performed:    /74   Pulse 58   Temp 98.4 °F (36.9 °C) (Oral)   Resp 18   Ht 5' 9\" (1.753 m)   Wt 225 lb 1.4 oz (102.1 kg)   SpO2 96%   BMI 33.24 kg/m²     General appearance: No apparent distress, appears stated age and cooperative. HEENT: Pupils equal, round, and reactive to light. Conjunctivae/corneas clear. Neck: Supple, with full range of motion. No jugular venous distention. Trachea midline. Respiratory:  Normal respiratory effort. Clear to auscultation, bilaterally without Rales/Wheezes/Rhonchi. Cardiovascular: Regular rate and rhythm with normal S1/S2 without murmurs, rubs or gallops. Abdomen: Soft, non-tender, non-distended with normal bowel sounds. Musculoskeletal: No clubbing, cyanosis or edema bilaterally.   Full range of motion without deformity. Skin: Skin color, texture, turgor normal.  No rashes or lesions. Neurologic:  Neurovascularly intact without any focal sensory/motor deficits. Cranial nerves: II-XII intact, grossly non-focal.  Psychiatric: Alert and oriented, thought content appropriate, normal insight  Capillary Refill: Brisk,< 3 seconds   Peripheral Pulses: +2 palpable, equal bilaterally       Labs:   Recent Labs     02/02/23 0455 02/03/23  0502 02/04/23 0457   WBC 8.2 9.9 9.2   HGB 8.4* 8.6* 8.9*   HCT 25.4* 26.3* 27.3*    315 308       Recent Labs     02/02/23 0455 02/03/23 0502 02/04/23 0457   * 130* 129*   K 4.3 4.8 4.5   CL 92* 90* 90*   CO2 23 22 27   BUN 51* 70* 46*   CREATININE 6.0* 7.7* 5.2*   CALCIUM 8.4 8.9 9.4   PHOS 5.5* 7.3*  --        No results for input(s): AST, ALT, BILIDIR, BILITOT, ALKPHOS in the last 72 hours. Recent Labs     02/01/23  1421   INR 1.15*       No results for input(s): Media Pale in the last 72 hours.       Urinalysis:      Lab Results   Component Value Date/Time    NITRU Negative 12/25/2022 05:00 AM    WBCUA 6-9 12/25/2022 05:00 AM    BACTERIA Rare 12/25/2022 05:00 AM    RBCUA 3-4 12/25/2022 05:00 AM    BLOODU SMALL 12/25/2022 05:00 AM    SPECGRAV 1.020 12/25/2022 05:00 AM    GLUCOSEU 500 12/25/2022 05:00 AM       Consults:    IP CONSULT TO HOSPITALIST  IP CONSULT TO NEPHROLOGY  IP CONSULT TO HEART FAILURE NURSE/COORDINATOR  IP CONSULT TO DIETITIAN      Assessment/Plan:    Active Hospital Problems    Diagnosis     Ischemic cardiomyopathy [I25.5]      Priority: High    Dyslipidemia [E78.5]      Priority: High    DM2 (diabetes mellitus, type 2) ([facility] [E11.9]      Priority: High    Hypertensive urgency [I16.0]      Priority: Medium    Acute respiratory failure with hypoxemia (HCC) [J96.01]      Priority: Medium    HFrEF (heart failure with reduced ejection fraction) ([facility] [I50.20]      Priority: Medium    Asthma-COPD overlap syndrome ([facility] [J44.9]      Priority: Medium    PAF (paroxysmal atrial fibrillation) ([facility]) [I48.0]     ESRD (end stage renal disease) ([facility] [N18.6]     ZAINAB on CPAP [G47.33, Z99.89]          Acute on chronic respiratory failure with hypoxemia  Etiology is obscure, clinically he could have been in COPD exacerbation with reported wheezing on the field, however when I examined him his lungs are clear  -BiPAP initiated by squad, continued and ER and ICU. After a few hours we were able to wean him off to nasal cannula oxygen. Currently on 3 LPM  -CT did not show any significant pulmonary edema or pleural effusions    Hypertensive urgency  -Started on nitro drip in the ED and now weaned off  -Continue metoprolol, Isordil     ESRD -  on HD M/W/F. Nephrology consulted. Patient reports compliance with HD treatment      HTN/CAD - w/ known CAD but no evidence of active signs/sxs of ischemia/failure. Currently controlled on home meds w/ vitals reviewed and documented as above. Troponin elevation - chronic elevation due to ESRD status. No ischemic changes on EKG. No anginal symptoms     CHF - chronic systolic failure w/ reduced EF 25-30% by Echo dated Dec 2022. Likely due to ischemic and hypertensive heart disease. Patient is euvolemic w/ no evidence of acute decompensation. Continue current medical mgt. Afib - chronic paroxysmal of unspecified and clinically unable to determine etiology. Normally rate controlled on BBlocker - continued. Anticoagulated at baseline on home Eliquis due to secondary hypercoaguable state due to Afib - continued. Monitored on tele. Obesity -  With Body mass index is 33.52 kg/m². Complicating assessment and treatment. Placing patient at risk for multiple co-morbidities as well as early death and contributing to the patient's presentation. Counseled on weight loss. ZAINAB - likely 2nd to obesity. Controlled on home CPAP/BiPap - continued, w/ outpatient f/u as previously arranged.         DVT Prophylaxis: Eliquis/IPC       Recent Labs     02/02/23  0455 02/03/23  0502 02/04/23  0457    315 308       Diet: ADULT DIET; Regular; Low Sodium (2 gm); Low Potassium (Less than 3000 mg/day); Low Phosphorus (Less than 1000 mg); 1200 ml  ADULT ORAL NUTRITION SUPPLEMENT; Breakfast, Dinner; Renal Oral Supplement  Code Status: Full Code       PT/OT Eval Status: not yet ordered.       Dispo - Patient is likely to remain in-house at least until Sunday/Monday 5/6 Feb pending clinical course and subspecialty Blayne Beyer MD

## 2023-02-04 NOTE — PROGRESS NOTES
02/04/23 0833   NIV Type   NIV Started/Stopped On   Equipment Type V60   Mode Bilevel   Mask Type Full face mask   Mask Size Medium   Settings/Measurements   IPAP 14 cmH20   CPAP/EPAP 6 cmH2O   Vt (Measured) 586 mL   Rate Ordered 8   Resp 18   FiO2  30 %   Minute Volume (L/min) 10.9 Liters   Mask Leak (lpm) 88 lpm   Comfort Level Good   Using Accessory Muscles No   SpO2 96   Alarm Settings   Alarms On Y   Low Pressure (cmH2O) 6 cmH2O   High Pressure (cmH2O) 30 cmH2O   Apnea (secs) 20 secs   RR Low (bpm) 6   RR High (bpm) 40 br/min   Oxygen Therapy/Pulse Ox   O2 Therapy Oxygen   $Oxygen $Daily Charge   O2 Device PAP (positive airway pressure)   SpO2 96 %   $Pulse Oximeter $Spot check (multiple/continuous)

## 2023-02-04 NOTE — FLOWSHEET NOTE
02/04/23 1237 02/04/23 1505   Vital Signs   BP (!) 145/83 (!) 114/57   Temp 97.8 °F (36.6 °C) 97 °F (36.1 °C)   Heart Rate 60 56   Resp 20 20     Treatment time: 2.5 hours ordered. See summary below. Net UF: 3363 ml    Pre weight: 101.8 kg (standing scale)  Post weight: 98.4 kg (standing scale)  EDW: 99.5 kg outpt clinic. Access used: [facility] HD Tunneled cath  Access function: No problems    Medications or blood products given: None    Regular outpatient schedule: Dee HERNÁNDEZ    Summary of response to treatment: 2.5 hour UF only tx. BP dropped to 82/37 with 6 minutes RTD, actual tx time 2:24. Tx stopped. Post rinseback /57. Would tolerated new EDW 99 kg. Copy of dialysis treatment record placed in chart, to be scanned into EMR. Report called to Abelardo Nelson RN.

## 2023-02-05 LAB
ALBUMIN SERPL-MCNC: 3.8 G/DL (ref 3.4–5)
ANION GAP SERPL CALCULATED.3IONS-SCNC: 15 MMOL/L (ref 3–16)
BUN BLDV-MCNC: 62 MG/DL (ref 7–20)
CALCIUM SERPL-MCNC: 9.2 MG/DL (ref 8.3–10.6)
CHLORIDE BLD-SCNC: 88 MMOL/L (ref 99–110)
CO2: 23 MMOL/L (ref 21–32)
CREAT SERPL-MCNC: 7.1 MG/DL (ref 0.9–1.3)
GFR SERPL CREATININE-BSD FRML MDRD: 8 ML/MIN/{1.73_M2}
GLUCOSE BLD-MCNC: 281 MG/DL (ref 70–99)
HCT VFR BLD CALC: 29.3 % (ref 40.5–52.5)
HEMOGLOBIN: 9.5 G/DL (ref 13.5–17.5)
MCH RBC QN AUTO: 30.2 PG (ref 26–34)
MCHC RBC AUTO-ENTMCNC: 32.3 G/DL (ref 31–36)
MCV RBC AUTO: 93.5 FL (ref 80–100)
PDW BLD-RTO: 16.9 % (ref 12.4–15.4)
PHOSPHORUS: 5.8 MG/DL (ref 2.5–4.9)
PLATELET # BLD: 291 K/UL (ref 135–450)
PMV BLD AUTO: 7.5 FL (ref 5–10.5)
POTASSIUM SERPL-SCNC: 4.8 MMOL/L (ref 3.5–5.1)
PRO-BNP: ABNORMAL PG/ML (ref 0–124)
RBC # BLD: 3.14 M/UL (ref 4.2–5.9)
SODIUM BLD-SCNC: 126 MMOL/L (ref 136–145)
WBC # BLD: 10.6 K/UL (ref 4–11)

## 2023-02-05 PROCEDURE — 2580000003 HC RX 258: Performed by: INTERNAL MEDICINE

## 2023-02-05 PROCEDURE — 83880 ASSAY OF NATRIURETIC PEPTIDE: CPT

## 2023-02-05 PROCEDURE — 6370000000 HC RX 637 (ALT 250 FOR IP): Performed by: INTERNAL MEDICINE

## 2023-02-05 PROCEDURE — 2500000003 HC RX 250 WO HCPCS: Performed by: INTERNAL MEDICINE

## 2023-02-05 PROCEDURE — 36415 COLL VENOUS BLD VENIPUNCTURE: CPT

## 2023-02-05 PROCEDURE — 85027 COMPLETE CBC AUTOMATED: CPT

## 2023-02-05 PROCEDURE — 94660 CPAP INITIATION&MGMT: CPT

## 2023-02-05 PROCEDURE — 2060000000 HC ICU INTERMEDIATE R&B

## 2023-02-05 PROCEDURE — 80069 RENAL FUNCTION PANEL: CPT

## 2023-02-05 RX ADMIN — SODIUM CHLORIDE, PRESERVATIVE FREE 10 ML: 5 INJECTION INTRAVENOUS at 09:55

## 2023-02-05 RX ADMIN — CALCIUM ACETATE 667 MG: 667 CAPSULE ORAL at 18:46

## 2023-02-05 RX ADMIN — PANTOPRAZOLE SODIUM 40 MG: 40 TABLET, DELAYED RELEASE ORAL at 09:55

## 2023-02-05 RX ADMIN — ISOSORBIDE DINITRATE 20 MG: 20 TABLET ORAL at 20:21

## 2023-02-05 RX ADMIN — APIXABAN 2.5 MG: 2.5 TABLET, FILM COATED ORAL at 09:55

## 2023-02-05 RX ADMIN — METOPROLOL SUCCINATE 100 MG: 50 TABLET, EXTENDED RELEASE ORAL at 20:21

## 2023-02-05 RX ADMIN — SODIUM CHLORIDE, PRESERVATIVE FREE 10 ML: 5 INJECTION INTRAVENOUS at 20:23

## 2023-02-05 RX ADMIN — ISOSORBIDE DINITRATE 20 MG: 20 TABLET ORAL at 13:14

## 2023-02-05 RX ADMIN — APIXABAN 2.5 MG: 2.5 TABLET, FILM COATED ORAL at 20:21

## 2023-02-05 RX ADMIN — ISOSORBIDE DINITRATE 20 MG: 20 TABLET ORAL at 09:55

## 2023-02-05 RX ADMIN — GABAPENTIN 200 MG: 100 CAPSULE ORAL at 20:25

## 2023-02-05 RX ADMIN — CLOPIDOGREL BISULFATE 75 MG: 75 TABLET ORAL at 09:55

## 2023-02-05 RX ADMIN — QUETIAPINE FUMARATE 50 MG: 25 TABLET ORAL at 20:21

## 2023-02-05 RX ADMIN — METOPROLOL SUCCINATE 100 MG: 50 TABLET, EXTENDED RELEASE ORAL at 09:55

## 2023-02-05 RX ADMIN — PRAVASTATIN SODIUM 40 MG: 40 TABLET ORAL at 09:55

## 2023-02-05 RX ADMIN — CALCIUM ACETATE 667 MG: 667 CAPSULE ORAL at 13:14

## 2023-02-05 RX ADMIN — DULOXETINE HYDROCHLORIDE 30 MG: 30 CAPSULE, DELAYED RELEASE ORAL at 09:55

## 2023-02-05 ASSESSMENT — PAIN SCALES - GENERAL
PAINLEVEL_OUTOF10: 0

## 2023-02-05 NOTE — PROGRESS NOTES
[facility]. [employer]  Nephrology Follow up Note           Reason for Consult: ESRD  Requesting Physician:  Dr. Karoline Jha history    IUF on 2/4 with 3.4 liters removed, post-weight of 98.4 kg. HD on 2/3 with 1 L UF, post-weight of  99.3 kg; pt felt light headed when tried to challenge his TW  UF w 4l over 2.5h on 2/3; post wt 99.6 kg   HD on 2/1 c/b by catheter dysfunction ultimately required exchange, had ~4l net UF over 3h, post wt 104.5 kg   HD on 1/30 w 2.5l UF over 2h as the system clotted twice during HD , post wt 101 kg   States shortness of breath better. ROS: Intake adequate, no fevers. PSFH: No visitor    Scheduled Meds:   apixaban  2.5 mg Oral BID    calcium acetate  1 capsule Oral TID WC    clopidogrel  75 mg Oral Daily    DULoxetine  30 mg Oral Daily    isosorbide dinitrate  20 mg Oral TID    metoprolol succinate  100 mg Oral BID    pantoprazole  40 mg Oral QAM AC    pravastatin  40 mg Oral Daily    QUEtiapine  50 mg Oral Nightly    sodium chloride flush  5-40 mL IntraVENous 2 times per day    epoetin siddharth-epbx  10,000 Units IntraVENous Q MWF    calcitRIOL  1.25 mcg Oral Q MWF     Continuous Infusions:   sodium chloride       PRN Meds:.heparin (porcine), heparin (porcine), calcium carbonate, gabapentin, sodium chloride flush, sodium chloride, ondansetron **OR** ondansetron, polyethylene glycol, acetaminophen **OR** acetaminophen, ipratropium-albuterol, sennosides-docusate sodium, docusate sodium, loperamide    History of Present Illness on 1/30/2023:    45 y.o. yo male with PMH of ESRD, CHF on baseline home oxygen at 3 L, s/p TAVR, CAD, HTN, DM2, asthma-COPD who is admitted for increase shortness of breath  Patient reports that he has been feeling short of breath since Friday. He completed his dialysis for still felt short of breath. He was needing up to 5 to 6 L of oxygen at home.   He presented to the emergency room with increasing shortness of breath initially requiring BiPAP with 3 L of oxygen and has been admitted to ICU. Physical exam:   Constitutional:  VITALS:  /67   Pulse 60   Temp 98 °F (36.7 °C) (Oral)   Resp 18   Ht 5' 9\" (1.753 m)   Wt 219 lb 12.8 oz (99.7 kg)   SpO2 94%   BMI 32.46 kg/m²   Gen: alert, awake  Neck: No JVD  Skin: Unremarkable  Cardiovascular:  S1, S2 without m/r/g   Respiratory: CTA B without w/r/r; respiratory effort normal  Abdomen:  soft, nt, nd,   Extremities: no lower extremity edema  Neuro/Psy: AAoriented times 3 ; moves all 4 ext    Data/  Recent Labs     02/03/23  0502 02/04/23 0457 02/05/23  0447   WBC 9.9 9.2 10.6   HGB 8.6* 8.9* 9.5*   HCT 26.3* 27.3* 29.3*   MCV 93.6 91.8 93.5    308 291       Recent Labs     02/03/23  0502 02/04/23 0457 02/05/23  0447   * 129* 126*   K 4.8 4.5 4.8   CL 90* 90* 88*   CO2 22 27 23   GLUCOSE 149* 228* 281*   PHOS 7.3*  --  5.8*   MG 2.20 2.20  --    BUN 70* 46* 62*   CREATININE 7.7* 5.2* 7.1*   LABGLOM 8* 12* 8*         Assessment  -ESRD on HD Monday Wednesday Friday  -Acute on chronic respiratory failure  -Hypertensive urgency on nitro drip, has been weaned off this morning during my rounds  -CKD/MBD  -Hyperkalemia  -Anemia    Plan  -HD as per MWF schedule   Target weight 99.5 kg or lower as tolerated   Extra IUF session on 2/4 to challenge TW done   CXR after HD on 2/3 with continued pulmonary edema  -DAVON 10K units as ordered qMWF  -Calcitriol 1.25 mcg Monday Wednesday Friday  -serial labs  -renal diet    Thank you for the consultation. Please do not hesitate to call with questions. Hyun Finn MD  Office: [phone]  Fax:    [phone]  [facility]. [facility]

## 2023-02-05 NOTE — PROGRESS NOTES
02/04/23 2315   NIV Type   Equipment Type V60   Mode Bilevel   Mask Type Full face mask   Mask Size Medium   Settings/Measurements   PIP Observed 15 cm H20   IPAP 14 cmH20   CPAP/EPAP 6 cmH2O   Vt (Measured) 550 mL   Resp 19   FiO2  30 %   Minute Volume (L/min) 12 Liters   Mask Leak (lpm) 76 lpm   Comfort Level Good   Using Accessory Muscles No   SpO2 100   Patient's Home Machine No   Oxygen Therapy/Pulse Ox   Heart Rate 58   SpO2 100 %

## 2023-02-05 NOTE — PROGRESS NOTES
Hospitalist Progress Note      PCP: Dorethia Cabot, MD    Date of Admission: 1/29/2023    Chief Complaint: SOB    Subjective: no new c/o. Medications:  Reviewed    Infusion Medications    sodium chloride       Scheduled Medications    apixaban  2.5 mg Oral BID    calcium acetate  1 capsule Oral TID WC    clopidogrel  75 mg Oral Daily    DULoxetine  30 mg Oral Daily    isosorbide dinitrate  20 mg Oral TID    metoprolol succinate  100 mg Oral BID    pantoprazole  40 mg Oral QAM AC    pravastatin  40 mg Oral Daily    QUEtiapine  50 mg Oral Nightly    sodium chloride flush  5-40 mL IntraVENous 2 times per day    epoetin siddharth-epbx  10,000 Units IntraVENous Q MWF    calcitRIOL  1.25 mcg Oral Q MWF     PRN Meds: heparin (porcine), heparin (porcine), calcium carbonate, gabapentin, sodium chloride flush, sodium chloride, ondansetron **OR** ondansetron, polyethylene glycol, acetaminophen **OR** acetaminophen, ipratropium-albuterol, sennosides-docusate sodium, docusate sodium, loperamide      Intake/Output Summary (Last 24 hours) at 2/5/2023 0846  Last data filed at 2/4/2023 2100  Gross per 24 hour   Intake 720 ml   Output --   Net 720 ml         Physical Exam Performed:    /76   Pulse 56   Temp 97.7 °F (36.5 °C) (Oral)   Resp 16   Ht 5' 9\" (1.753 m)   Wt 219 lb 12.8 oz (99.7 kg)   SpO2 100%   BMI 32.46 kg/m²     General appearance: No apparent distress, appears stated age and cooperative. HEENT: Pupils equal, round, and reactive to light. Conjunctivae/corneas clear. Neck: Supple, with full range of motion. No jugular venous distention. Trachea midline. Respiratory:  Normal respiratory effort. Clear to auscultation, bilaterally without Rales/Wheezes/Rhonchi. Cardiovascular: Regular rate and rhythm with normal S1/S2 without murmurs, rubs or gallops. Abdomen: Soft, non-tender, non-distended with normal bowel sounds. Musculoskeletal: No clubbing, cyanosis or edema bilaterally.   Full range of motion without deformity. Skin: Skin color, texture, turgor normal.  No rashes or lesions. Neurologic:  Neurovascularly intact without any focal sensory/motor deficits. Cranial nerves: II-XII intact, grossly non-focal.  Psychiatric: Alert and oriented, thought content appropriate, normal insight  Capillary Refill: Brisk,< 3 seconds   Peripheral Pulses: +2 palpable, equal bilaterally       Labs:   Recent Labs     02/03/23  0502 02/04/23 0457 02/05/23 0447   WBC 9.9 9.2 10.6   HGB 8.6* 8.9* 9.5*   HCT 26.3* 27.3* 29.3*    308 291       Recent Labs     02/03/23  0502 02/04/23 0457 02/05/23 0447   * 129* 126*   K 4.8 4.5 4.8   CL 90* 90* 88*   CO2 22 27 23   BUN 70* 46* 62*   CREATININE 7.7* 5.2* 7.1*   CALCIUM 8.9 9.4 9.2   PHOS 7.3*  --  5.8*       No results for input(s): AST, ALT, BILIDIR, BILITOT, ALKPHOS in the last 72 hours. No results for input(s): INR in the last 72 hours. No results for input(s): Imagene Plank in the last 72 hours.       Urinalysis:      Lab Results   Component Value Date/Time    NITRU Negative 12/25/2022 05:00 AM    WBCUA 6-9 12/25/2022 05:00 AM    BACTERIA Rare 12/25/2022 05:00 AM    RBCUA 3-4 12/25/2022 05:00 AM    BLOODU SMALL 12/25/2022 05:00 AM    SPECGRAV 1.020 12/25/2022 05:00 AM    GLUCOSEU 500 12/25/2022 05:00 AM       Consults:    IP CONSULT TO HOSPITALIST  IP CONSULT TO NEPHROLOGY  IP CONSULT TO HEART FAILURE NURSE/COORDINATOR  IP CONSULT TO DIETITIAN      Assessment/Plan:    Active Hospital Problems    Diagnosis     Ischemic cardiomyopathy [I25.5]      Priority: High    Dyslipidemia [E78.5]      Priority: High    DM2 (diabetes mellitus, type 2) ([facility] [E11.9]      Priority: High    Hypertensive urgency [I16.0]      Priority: Medium    Acute respiratory failure with hypoxemia (HCC) [J96.01]      Priority: Medium    HFrEF (heart failure with reduced ejection fraction) ([facility] [I50.20]      Priority: Medium    Asthma-COPD overlap syndrome ([facility] [J44.9] Priority: Medium    PAF (paroxysmal atrial fibrillation) ([facility] [I48.0]     ESRD (end stage renal disease) ([facility] [address] [N18.6]     ZAINAB on CPAP [G47.33, Z99.89]          Acute on chronic respiratory failure with hypoxemia  Etiology is obscure, clinically he could have been in COPD exacerbation with reported wheezing on the field, however when I examined him his lungs are clear  -BiPAP initiated by squad, continued and ER and ICU. After a few hours we were able to wean him off to nasal cannula oxygen. Currently on 3 LPM  -CT did not show any significant pulmonary edema or pleural effusions    HTN Emergency - w/ SBP>180 and/or DBP>120 w/ associated pulmonary edema. Required immediate tx, started on nitro drip in the ED and now weaned off     ESRD -  on HD M/W/F. Nephrology consulted. Patient reports compliance with HD treatment      HTN/CAD - w/ known CAD but no evidence of active signs/sxs of ischemia/failure. Currently controlled on home meds w/ vitals reviewed and documented as above. Troponin elevation - chronic elevation due to ESRD status. No ischemic changes on EKG. No anginal symptoms     CHF - chronic systolic failure w/ reduced EF 25-30% by Echo dated Dec 2022. Likely due to ischemic and hypertensive heart disease. Patient is euvolemic w/ no evidence of acute decompensation. Continue current medical mgt. Afib - chronic paroxysmal of unspecified and clinically unable to determine etiology. Normally rate controlled on BBlocker - continued. Anticoagulated at baseline on home Eliquis due to secondary hypercoaguable state due to Afib - continued. Monitored on tele. Anemia - likely 2nd to CKD, w/out evidence of active bleeding/hemolysis. Stable and asymptomatic w/out indication for transfusion. Follow serial labs. Reviewed and documented as above. Obesity -  With Body mass index is 33.52 kg/m². Complicating assessment and treatment.  Placing patient at risk for multiple co-morbidities as well as early death and contributing to the patient's presentation. Counseled on weight loss. ZAINAB - likely 2nd to obesity. Controlled on home CPAP/BiPap - continued, w/ outpatient f/u as previously arranged. DVT Prophylaxis: Eliquis/IPC       Recent Labs     02/03/23  0502 02/04/23  0457 02/05/23  0447    308 291       Diet: ADULT DIET; Regular; Low Sodium (2 gm); Low Potassium (Less than 3000 mg/day); Low Phosphorus (Less than 1000 mg); 1200 ml  ADULT ORAL NUTRITION SUPPLEMENT; Breakfast, Dinner; Renal Oral Supplement  Code Status: Full Code       PT/OT Eval Status: not yet ordered.       Dispo - Patient is likely to remain in-house at least until Sunday/Monday 5/6 Feb pending clinical course and subspecialty Sherwin Garcia MD

## 2023-02-05 NOTE — PROGRESS NOTES
02/05/23 0754   NIV Type   NIV Started/Stopped On   Equipment Type V60   Mode Bilevel   Mask Type Full face mask   Mask Size Medium   Settings/Measurements   IPAP 14 cmH20   CPAP/EPAP 6 cmH2O   Vt (Measured) 551 mL   Rate Ordered 8   Resp 16   FiO2  30 %   Minute Volume (L/min) 7.8 Liters   Mask Leak (lpm) 57 lpm   Comfort Level Good   Using Accessory Muscles No   SpO2 99   Alarm Settings   Alarms On Y   Low Pressure (cmH2O) 6 cmH2O   High Pressure (cmH2O) 30 cmH2O   Apnea (secs) 20 secs   RR Low (bpm) 6   RR High (bpm) 40 br/min

## 2023-02-05 NOTE — PROGRESS NOTES
02/05/23 0355   NIV Type   $NIV $Daily Charge   Equipment Type V60   Mode Bilevel   Mask Type Full face mask   Mask Size Medium   Settings/Measurements   PIP Observed 14 cm H20   IPAP 14 cmH20   CPAP/EPAP 6 cmH2O   Vt (Measured) 330 mL   Resp 19   FiO2  30 %   Minute Volume (L/min) 6 Liters   Mask Leak (lpm) 75 lpm   Comfort Level Good   Using Accessory Muscles No   Patient's Home Machine No

## 2023-02-06 VITALS
BODY MASS INDEX: 32.82 KG/M2 | WEIGHT: 221.56 LBS | OXYGEN SATURATION: 98 % | TEMPERATURE: 97.8 F | SYSTOLIC BLOOD PRESSURE: 152 MMHG | DIASTOLIC BLOOD PRESSURE: 72 MMHG | HEART RATE: 62 BPM | RESPIRATION RATE: 18 BRPM | HEIGHT: 69 IN

## 2023-02-06 LAB
ALBUMIN SERPL-MCNC: 3.9 G/DL (ref 3.4–5)
ANION GAP SERPL CALCULATED.3IONS-SCNC: 18 MMOL/L (ref 3–16)
BUN BLDV-MCNC: 81 MG/DL (ref 7–20)
CALCIUM SERPL-MCNC: 9.1 MG/DL (ref 8.3–10.6)
CHLORIDE BLD-SCNC: 85 MMOL/L (ref 99–110)
CO2: 22 MMOL/L (ref 21–32)
CREAT SERPL-MCNC: 8.7 MG/DL (ref 0.9–1.3)
GFR SERPL CREATININE-BSD FRML MDRD: 7 ML/MIN/{1.73_M2}
GLUCOSE BLD-MCNC: 175 MG/DL (ref 70–99)
HCT VFR BLD CALC: 29.2 % (ref 40.5–52.5)
HEMOGLOBIN: 9.5 G/DL (ref 13.5–17.5)
MCH RBC QN AUTO: 30 PG (ref 26–34)
MCHC RBC AUTO-ENTMCNC: 32.5 G/DL (ref 31–36)
MCV RBC AUTO: 92.5 FL (ref 80–100)
PDW BLD-RTO: 16.4 % (ref 12.4–15.4)
PHOSPHORUS: 7.4 MG/DL (ref 2.5–4.9)
PLATELET # BLD: 280 K/UL (ref 135–450)
PMV BLD AUTO: 7.8 FL (ref 5–10.5)
POTASSIUM SERPL-SCNC: 5.9 MMOL/L (ref 3.5–5.1)
RBC # BLD: 3.16 M/UL (ref 4.2–5.9)
SODIUM BLD-SCNC: 125 MMOL/L (ref 136–145)
WBC # BLD: 10.9 K/UL (ref 4–11)

## 2023-02-06 PROCEDURE — 94761 N-INVAS EAR/PLS OXIMETRY MLT: CPT

## 2023-02-06 PROCEDURE — 6370000000 HC RX 637 (ALT 250 FOR IP): Performed by: INTERNAL MEDICINE

## 2023-02-06 PROCEDURE — 85027 COMPLETE CBC AUTOMATED: CPT

## 2023-02-06 PROCEDURE — 36415 COLL VENOUS BLD VENIPUNCTURE: CPT

## 2023-02-06 PROCEDURE — 2700000000 HC OXYGEN THERAPY PER DAY

## 2023-02-06 PROCEDURE — 6370000000 HC RX 637 (ALT 250 FOR IP)

## 2023-02-06 PROCEDURE — 80069 RENAL FUNCTION PANEL: CPT

## 2023-02-06 PROCEDURE — 6360000002 HC RX W HCPCS

## 2023-02-06 PROCEDURE — 90935 HEMODIALYSIS ONE EVALUATION: CPT

## 2023-02-06 PROCEDURE — 2500000003 HC RX 250 WO HCPCS: Performed by: INTERNAL MEDICINE

## 2023-02-06 RX ORDER — MIDODRINE HYDROCHLORIDE 5 MG/1
10 TABLET ORAL PRN
Status: DISCONTINUED | OUTPATIENT
Start: 2023-02-06 | End: 2023-02-06 | Stop reason: HOSPADM

## 2023-02-06 RX ORDER — HEPARIN SODIUM 1000 [USP'U]/ML
INJECTION, SOLUTION INTRAVENOUS; SUBCUTANEOUS
Status: COMPLETED
Start: 2023-02-06 | End: 2023-02-06

## 2023-02-06 RX ORDER — MIDODRINE HYDROCHLORIDE 5 MG/1
TABLET ORAL
Status: COMPLETED
Start: 2023-02-06 | End: 2023-02-06

## 2023-02-06 RX ADMIN — HEPARIN SODIUM 3800 UNITS: 1000 INJECTION INTRAVENOUS; SUBCUTANEOUS at 07:42

## 2023-02-06 RX ADMIN — ISOSORBIDE DINITRATE 20 MG: 20 TABLET ORAL at 13:19

## 2023-02-06 RX ADMIN — DULOXETINE HYDROCHLORIDE 30 MG: 30 CAPSULE, DELAYED RELEASE ORAL at 12:46

## 2023-02-06 RX ADMIN — MIDODRINE HYDROCHLORIDE 10 MG: 5 TABLET ORAL at 09:26

## 2023-02-06 RX ADMIN — HEPARIN SODIUM 3800 UNITS: 1000 INJECTION, SOLUTION INTRAVENOUS; SUBCUTANEOUS at 07:42

## 2023-02-06 RX ADMIN — PANTOPRAZOLE SODIUM 40 MG: 40 TABLET, DELAYED RELEASE ORAL at 07:00

## 2023-02-06 RX ADMIN — CALCIUM ACETATE 667 MG: 667 CAPSULE ORAL at 13:19

## 2023-02-06 RX ADMIN — CLOPIDOGREL BISULFATE 75 MG: 75 TABLET ORAL at 12:46

## 2023-02-06 RX ADMIN — METOPROLOL SUCCINATE 100 MG: 50 TABLET, EXTENDED RELEASE ORAL at 12:46

## 2023-02-06 RX ADMIN — PRAVASTATIN SODIUM 40 MG: 40 TABLET ORAL at 12:46

## 2023-02-06 RX ADMIN — EPOETIN ALFA-EPBX 10000 UNITS: 10000 INJECTION, SOLUTION INTRAVENOUS; SUBCUTANEOUS at 07:43

## 2023-02-06 RX ADMIN — APIXABAN 2.5 MG: 2.5 TABLET, FILM COATED ORAL at 12:46

## 2023-02-06 RX ADMIN — CALCITRIOL 1.25 MCG: 0.25 CAPSULE ORAL at 12:45

## 2023-02-06 ASSESSMENT — PAIN DESCRIPTION - LOCATION: LOCATION: BACK

## 2023-02-06 ASSESSMENT — PAIN - FUNCTIONAL ASSESSMENT: PAIN_FUNCTIONAL_ASSESSMENT: ACTIVITIES ARE NOT PREVENTED

## 2023-02-06 ASSESSMENT — PAIN SCALES - GENERAL
PAINLEVEL_OUTOF10: 8
PAINLEVEL_OUTOF10: 0

## 2023-02-06 ASSESSMENT — PAIN DESCRIPTION - DESCRIPTORS: DESCRIPTORS: ACHING;DULL

## 2023-02-06 NOTE — PROGRESS NOTES
02/06/23 0008   NIV Type   NIV Started/Stopped On   Equipment Type v60   Mode Bilevel   Settings/Measurements   PIP Observed 14 cm H20   IPAP 14 cmH20   CPAP/EPAP 6 cmH2O   Vt (Measured) 700 mL   Rate Ordered 10   Resp 15   Insp Rise Time (%) 3 %   FiO2  28 %   I Time/ I Time % 1 s   Minute Volume (L/min) 10.3 Liters   Mask Leak (lpm) 26 lpm   Comfort Level Good   Using Accessory Muscles No   SpO2 95   Patient's Home Machine No   Alarm Settings   Alarms On Y   Low Pressure (cmH2O) 6 cmH2O   High Pressure (cmH2O) 30 cmH2O   Delay Alarm 20 sec(s)   RR Low (bpm) 6   RR High (bpm) 40 br/min

## 2023-02-06 NOTE — DISCHARGE INSTR - COC
Continuity of Care Form    Patient Name: Margie Dover   :  1970  MRN:  [id number]    Admit date:  2023  Discharge date:  2023    Code Status Order: Full Code   Advance Directives:     Admitting Physician:  Reid Shin MD  PCP: Doyle Flores MD    Discharging Nurse: Jonatan Alcantar [address] Unit/Room#: 0118/0118-02  Discharging Unit Phone Number:     Emergency Contact:   Extended Emergency Contact Information  Primary Emergency Contact: Crystal Ann  Address: [address] [facility],[address] Phone: [phone]  Mobile Phone: [phone]  Relation: Spouse  Secondary Emergency Contact: ALICIA HARO JR. [facility] Phone: [phone]  Mobile Phone: [phone]  Relation: Brother/Sister  Preferred language: English   needed?  No    Past Surgical History:  Past Surgical History:   Procedure Laterality Date    AORTIC VALVE REPLACEMENT N/A 10/15/2019    TRANSCATHETER AORTIC VALVE REPLACEMENT FEMORAL APPROACH performed by Arlette Monzon MD at [address] [facility] 2019    PERITONEAL DIALYSIS CATHETER REMOVAL performed by Alek Castaneda MD at [address] [place] [address]      [facility]    CORONARY ANGIOPLASTY WITH STENT PLACEMENT  05/26/15    CYST REMOVAL  2013    EXCISION CYSTS, NECK X2 AND ABDOMINAL benign    DIAGNOSTIC CARDIAC CATH LAB PROCEDURE      DIALYSIS FISTULA CREATION Left 10/30/2017    LEFT BRACHIAL CEPHALIC FISTULA    DIALYSIS FISTULA CREATION Left 3/27/2019    LIGATION  AV FISTULA performed by Edward Ramirez MD at [address] [place] [address], DIAGNOSTIC      IR TUNNELED CATHETER PLACEMENT GREATER THAN 5 YEARS  3/21/2022    IR TUNNELED CATHETER PLACEMENT GREATER THAN 5 YEARS 3/21/2022 [facility]    IR TUNNELED CATHETER PLACEMENT GREATER THAN 5 YEARS  2022    IR TUNNELED CATHETER PLACEMENT GREATER THAN 5 YEARS 2022 [facility]    IR TUNNELED CATHETER PLACEMENT GREATER THAN 5 YEARS  4/26/2022    IR TUNNELED CATHETER PLACEMENT GREATER THAN 5 YEARS 4/26/2022 [facility]    IR TUNNELED CATHETER PLACEMENT GREATER THAN 5 YEARS  6/23/2022    IR TUNNELED CATHETER PLACEMENT GREATER THAN 5 YEARS 6/23/2022 [facility]    OTHER SURGICAL HISTORY  02/01/2017    laparoscopic cholecystectomy with intraoperative cholangiogram    OTHER SURGICAL HISTORY  2018    PORT PLACEMENT  - vas cath    OTHER SURGICAL HISTORY Bilateral 06/26/2018    laprascopic peritoneal dialysis catheter placement    OTHER SURGICAL HISTORY Right 09/2018    peritoneal dialysis port placed on right side of abdomen    OTHER SURGICAL HISTORY  05/28/2019    PTA/Stenting R External Iliac artery    SC LAPS INSERTION TUNNELED INTRAPERITONEAL CATHETER N/A 9/21/2018    LAPAROSCOPIC PERITONEAL DIALYSIS CATHETER REPLACEMENT performed by Alek Castaneda MD at [address] [facility] [address] 2/24/2022    PERINEAL ABCESS DRAINAGE performed by Alek Castaneda MD at [address] [place] [address] N/A 10/2/2022    THORACENTESIS ULTRASOUND performed by Ashley Yanez MD at [address] [place] [address]  01/06/2016    UPPER GASTROINTESTINAL ENDOSCOPY  01/29/2017    possible candida, otherwise normal appearing    VASCULAR SURGERY  aprx 2 years ago    2 stents placed, each side of groin       Immunization History:   Immunization History   Administered Date(s) Administered    Hepatitis B Adult (Engerix-B) 11/18/2017, 06/13/2018, 07/13/2018    INFLUENZA, INTRADERMAL, QUADRIVALENT, PRESERVATIVE FREE 11/16/2016    Influenza Virus Vaccine 12/27/2012, 10/07/2014, 11/20/2015, 10/16/2019    Influenza, FLUARIX, FLULAVAL, FLUZONE (age 10 mo+) AND AFLURIA, (age 1 y+), PF, 0.5mL 10/16/2019    Influenza, FLUCELVAX, (age 10 mo+), MDCK, PF, 0.5mL 10/14/2017, 09/30/2020, 10/05/2021    Influenza, Quadv, 6 mo and older, IM, PF (Flulaval, Fluarix) 09/15/2018    PPD Test 11/09/2017, 11/16/2017, 01/03/2019, 01/06/2020, 01/15/2021    Pneumococcal Conjugate 13-valent ([id number]) 10/14/2017    Pneumococcal Polysaccharide ([id number]) 10/07/2014, 11/19/2019    Tdap (Boostrix, Adacel) 02/13/2020    Zoster Recombinant (Shingrix) 02/13/2020       Active Problems:  Patient Active Problem List   Diagnosis Code    Sepsis without acute organ dysfunction ([facility] A41.9    CHF (congestive heart failure) ([facility]) I50.9    Asthma-COPD overlap syndrome ([facility]) J44.9    CAD (coronary artery disease) I25.10    PVD (peripheral vascular disease) ([facility] I73.9    Bicuspid aortic valve Q23.1    Bilateral hilar adenopathy syndrome R59.0    Claudication in peripheral vascular disease ([facility]) I73.9    Uncontrolled hypertension I10    Diabetic neuropathy ([facility]) E11.40    DM2 (diabetes mellitus, type 2) ([facility] E11.9    Passive smoke exposure Z77.22    Depression with anxiety F41.8    PNA (pneumonia) J18.9    Obesity (BMI 30-39. 9) E66.9    ZAINAB on CPAP G47.33, Z99.89    Degeneration of lumbar or lumbosacral intervertebral disc M51.37    Lumbar radiculopathy M54.16    Lumbosacral spondylosis without myelopathy V75.369    Biliary colic U92.11    Symptomatic cholelithiasis K80.20    Gastroparesis due to DM ([facility]) E11.43, K31.84    Angina, class IV ([facility]) I20.9    Dyspnea R06.00    Dyslipidemia E78.5    Acute on chronic combined systolic and diastolic heart failure ([facility]) I50.43    Ischemic cardiomyopathy I25.5    Tobacco abuse Z72.0    CVA (cerebral vascular accident) ([facility] I63.9    History of CVA (cerebrovascular accident) Z86.73    Type 2 diabetes mellitus without complication, without long-term current use of insulin ([facility]) E11.9    ZAINAB (obstructive sleep apnea) G47.33    Pleural effusion on left J90    Chronic anemia D64.9    Nonrheumatic aortic valve stenosis I35.0    Mucus plugging of bronchi T17.500A    Hemodialysis-associated hypotension I95.3    ESRD (end stage renal disease) ([facility] N18.6    Hypotension due to drugs A71.5    Acute diastolic CHF (congestive heart failure) ([facility]) I50.31    Neuromuscular disorder ([facility]) G70.9    Renovascular hypertension I15.0    Mixed hyperlipidemia E78.2    Cigarette nicotine dependence in remission F17.211    Pulmonary edema J81.1    Fluid overload E87.70    Anemia of chronic disease D63.8    SOB (shortness of breath) on exertion R06.02    Steal syndrome of dialysis vascular access [facility]) T82.898A    Chronic, continuous use of opioids F11.90    Chronic bronchitis ([facility]) J42    Nasal congestion R09.81    Hypercholesteremia E78.00    Bradycardia R00.1    History of transcatheter aortic valve replacement (TAVR) Z95.2    Syncope and collapse R55    PAF (paroxysmal atrial fibrillation) ([facility]) I48.0    Bilateral leg weakness R29.898    GBS (Guillain-[place] syndrome) ([facility]) G61.0    Sinus pause I45.5    Weakness of both lower extremities R29.898    Sinus bradycardia R00.1    Ataxia R27.0    Peripheral vascular occlusive disease ([facility]) I73.9    Cellulitis of right foot L03.115    Iliac artery occlusion, right ([facility]) I74.5    Cellulitis and abscess of hand L03.119, L02.519    Uncontrolled type 2 diabetes mellitus with hyperglycemia ([facility]) E11.65    Acute encephalopathy G93.40    Acute hypoxemic respiratory failure ([facility]) J96.01    Acute CVA (cerebrovascular accident) ([facility] I63.9    Speech problem R47.9    Urinary tract infection with hematuria N39.0, R31.9    Respiratory failure with hypoxia ([facility] J96.91    Acute respiratory failure with hypercapnia ([facility]) J96.02    Acute pulmonary edema ([facility]) J81.0    Grade II diastolic dysfunction N54.53    Shock circulatory ([facility]) R57.9    Smoker F17.200    Normocytic normochromic anemia D64.9    NSTEMI (non-ST elevated myocardial infarction) ([facility]) I21.4    SIRS (systemic inflammatory response syndrome) ([facility]) R65.10    Atrial flutter ([facility]) I48.92    [place]-rectal abscess K61.1    Somnolence R40.0    Pulmonary edema with diastolic CHF, NYHA class 3 ([facility]) I50.30    Respiratory failure ([facility] J96.90    Former smoker Z87.891    Cardiomyopathy ([facility] I42.9    Morbid obesity with BMI of 40.0-44.9, adult ([facility] E66.01, Z68.41    Hypoglycemia E16.2    Altered mental status R41.82    Hypertensive emergency I16.1    SOB (shortness of breath) R06.02    Acute respiratory failure with hypoxia and hypercapnia ([facility]) J96.01, J96.02    HFrEF (heart failure with reduced ejection fraction) ([facility]) I50.20    Pericardial effusion I31.39    Hypervolemia E87.70    Acute pneumonia J18.9    Acute on chronic respiratory failure with hypoxia ([facility]) J96.21    Compression atelectasis J98.11    Type 2 myocardial infarction ([facility] I21. A1    Acute respiratory failure with hypoxemia ([facility]) J96.01    Hyperkalemia E87.5    Hyponatremia T86.1    Metabolic acidemia U85.86    Pulmonary infiltrate R91.8    Leukocytosis D72.829    Hypertensive urgency I16.0       Isolation/Infection:   Isolation            No Isolation          Patient Infection Status       Infection Onset Added Last Indicated Last Indicated By Review Planned Expiration Resolved Resolved By    None active    Resolved    COVID-19 (Rule Out) 01/29/23 01/29/23 01/29/23 COVID-19 & Influenza Combo (Ordered)   01/29/23 Rule-Out Test Resulted    COVID-19 (Rule Out) 12/25/22 12/25/22 12/25/22 COVID-19 & Influenza Combo (Ordered)   12/25/22 Rule-Out Test Resulted    COVID-19 (Rule Out) 12/04/22 12/04/22 12/04/22 COVID-19 & Influenza Combo (Ordered)   12/04/22 Rule-Out Test Resulted    COVID-19 (Rule Out) 12/03/22 12/03/22 12/03/22 COVID-19 & Influenza Combo (Ordered)   12/03/22 Rule-Out Test Resulted    C-diff Rule Out 12/02/22 12/02/22 12/02/22 Clostridium difficile toxin/antigen (Ordered)   12/03/22 Rule-Out Test Canceled    COVID-19 (Rule Out) 11/25/22 11/25/22 11/25/22 COVID-19 & Influenza Combo (Ordered)   11/25/22 Rule-Out Test Resulted    COVID-19 (Rule Out) 10/18/22 10/18/22 10/18/22 COVID-19, Rapid (Ordered)   10/18/22 Rule-Out Test Resulted    COVID-19 (Rule Out) 09/23/22 09/23/22 09/23/22 COVID-19, Rapid (Ordered)   09/23/22 Rule-Out Test Resulted    COVID-19 (Rule Out) 08/02/22 08/02/22 08/02/22 COVID-19, Rapid (Ordered)   08/02/22 Rule-Out Test Resulted    COVID-19 (Rule Out) 07/05/22 07/05/22 07/06/22 SARS-CoV-2 NAAT (Rapid) (Ordered)   07/06/22 Rule-Out Test Resulted    COVID-19 (Rule Out) 05/29/22 05/29/22 05/29/22 COVID-19, Rapid (Ordered)   05/29/22 Rule-Out Test Resulted    COVID-19 (Rule Out) 04/18/22 04/18/22 04/18/22 COVID-19, Rapid (Ordered)   04/18/22 Rule-Out Test Resulted    COVID-19 (Rule Out) 02/25/22 02/25/22 02/25/22 COVID-19, Rapid (Ordered)   02/25/22 Rule-Out Test Resulted    COVID-19 (Rule Out) 01/12/22 01/12/22 01/12/22 COVID-19, Rapid (Ordered)   01/12/22 Rule-Out Test Resulted    COVID-19 (Rule Out) 11/29/21 11/29/21 11/29/21 COVID-19, Rapid (Ordered)   11/30/21 Rule-Out Test Resulted    COVID-19 (Rule Out) 08/29/21 08/29/21 08/29/21 COVID-19 (Ordered)   08/29/21 Rule-Out Test Resulted    COVID-19 (Rule Out) 12/18/20 12/18/20 12/18/20 COVID-19 (Ordered)   12/18/20 Rule-Out Test Resulted    COVID-19 (Rule Out) 05/04/20 05/04/20 05/04/20 COVID-19 (Ordered)   05/04/20 Rule-Out Test Resulted            Nurse Assessment:  Last Vital Signs: BP (!) 152/72   Pulse 62   Temp 97.8 °F (36.6 °C) (Oral)   Resp 18   Ht 5' 9\" (1.753 m)   Wt 221 lb 9 oz (100.5 kg)   SpO2 98%   BMI 32.72 kg/m²     Last documented pain score (0-10 scale): Pain Level: 8  Last Weight:   Wt Readings from Last 1 Encounters:   02/06/23 221 lb 9 oz (100.5 kg)     Mental Status:  oriented and alert    IV Access:  Left vas catheter    Nursing Mobility/ADLs:  Walking   Independent  Transfer  Independent  Bathing  Independent  Dressing  Independent  Toileting  Independent  Feeding  [address] Delivery   whole    Wound Care Documentation and Therapy:  Wound 08/30/21 Toe (Comment  which one) Right Great Toe (Active) Number of days: 524       Wound 01/12/22 Pedal Anterior;Right Healing scab from previous blister (per pt), flaky edges (Active)   Number of days: 390       Wound 01/12/22 Toe (Comment  which one) Anterior;Right Scab from old blister (per pt) on 4th toe, flaky edges (Active)   Number of days: 390       Wound 02/03/23 Toe (Comment  which one) Distal see under wound type (Active)   Dressing Status Clean;Dry; Intact 02/06/23 1251   Wound Cleansed Cleansed with saline 02/04/23 0800   Dressing/Treatment Foam 02/06/23 1251   Wound Length (cm) 0.75 cm 02/03/23 2140   Wound Width (cm) 0.75 cm 02/03/23 2140   Wound Surface Area (cm^2) 0.5625 cm^2 02/03/23 2140   Drainage Amount None 02/06/23 1251   Number of days: 2       Wound 02/03/23 Toe (Comment  which one) small open area (Active)   Dressing Status Clean;Dry; Intact 02/06/23 1251   Wound Cleansed Cleansed with saline 02/04/23 0800   Dressing/Treatment Adhesive bandage 02/06/23 1251   Wound Length (cm) 0.75 cm 02/03/23 2140   Wound Width (cm) 0.5 cm 02/03/23 2140   Wound Surface Area (cm^2) 0.375 cm^2 02/03/23 2140   Drainage Amount None 02/06/23 1251   Number of days: 2       Incision 02/24/22 Perineum (Active)   Number of days: 346     Daily cleanse right large toe and 4th toe with normal saline, pat dry apply Alginate Ag, cover with gauze and tape. Elimination:  Continence: Bowel: Yes  Bladder: Yes HD- MWF  Urinary Catheter: None   Colostomy/Ileostomy/Ileal Conduit: No       Date of Last BM:     Intake/Output Summary (Last 24 hours) at 2/6/2023 1401  Last data filed at 2/6/2023 1251  Gross per 24 hour   Intake 360 ml   Output --   Net 360 ml     I/O last 3 completed shifts: In: 240 [P.O.:240]  Out: -     Safety Concerns: At Risk for Falls    Impairments/Disabilities:      None    Nutrition Therapy:  Current Nutrition Therapy:   - Oral Diet:  Carb Control 4 carbs/meal (1800kcals/day)/Low Na, K, Ph    Routes of Feeding: Oral  Liquids:  Thin Liquids  Daily Fluid Restriction: yes - amount 1200ml/day  Last Modified Barium Swallow with Video (Video Swallowing Test): not done    Treatments at the Time of Hospital Discharge:   Respiratory Treatments:   Oxygen Therapy:  is on oxygen at 3 L/min per nasal cannula. Ventilator:    - CPAP   only when sleeping    Rehab Therapies: Physical Therapy and Occupational Therapy  Weight Bearing Status/Restrictions: No weight bearing restrictions  Other Medical Equipment (for information only, NOT a DME order):  none  Other Treatments:     Patient's personal belongings (please select all that are sent with patient):  Glasses    RN SIGNATURE:  Electronically signed by Nelson Nieves RN on 2/6/23 at 2:18 PM EST    CASE MANAGEMENT/SOCIAL WORK SECTION    Inpatient Status Date: ***    Readmission Risk Assessment Score:  Readmission Risk              Risk of Unplanned Readmission:  80           Discharging to Facility/ Agency   Name:   Address:  Phone:  Fax:    Dialysis Facility (if applicable)   Name:  Address:  Dialysis Schedule:  Phone:  Fax:    / signature: {Esignature:[id number]}    PHYSICIAN SECTION    Prognosis: Good    Condition at Discharge: Stable    Rehab Potential (if transferring to Rehab): Good    Recommended Labs or Other Treatments After Discharge: None    Physician Certification: I certify the above information and transfer of Mariela Chiu  is necessary for the continuing treatment of the diagnosis listed and that he requires [facility] for less 30 days.      Update Admission H&P: No change in H&P    PHYSICIAN SIGNATURE:  Electronically signed by Joe Ahmadi MD on 2/6/23 at 2:01 PM EST

## 2023-02-06 NOTE — DISCHARGE SUMMARY
Hospital Medicine Discharge Summary    Patient ID: Sarah Siu      Patient's PCP: Yanira Damon MD    Admit Date: 1/29/2023     Discharge Date: 2/6/2023      Admitting Physician: Monica Peralta MD     Discharge Physician: Lee Wall MD     Discharge Diagnoses: Active Hospital Problems    Diagnosis     Ischemic cardiomyopathy [I25.5]      Priority: High    Dyslipidemia [E78.5]      Priority: High    DM2 (diabetes mellitus, type 2) ([facility]) [E11.9]      Priority: High    Hypertensive urgency [I16.0]      Priority: Medium    Acute respiratory failure with hypoxemia ([facility]) [J96.01]      Priority: Medium    HFrEF (heart failure with reduced ejection fraction) ([facility]) [I50.20]      Priority: Medium    Asthma-COPD overlap syndrome ([facility] [J44.9]      Priority: Medium    PAF (paroxysmal atrial fibrillation) ([facility] [I48.0]     ESRD (end stage renal disease) ([facility] [N18.6]     ZAINAB on CPAP [G47.33, Z99.89]        The patient was seen and examined on day of discharge and this discharge summary is in conjunction with any daily progress note from day of discharge. Hospital Course:          Acute on chronic respiratory failure with hypoxemia  Etiology is obscure, clinically he could have been in COPD exacerbation with reported wheezing on the field, however when I examined him his lungs are clear  -BiPAP initiated by squad, continued and ER and ICU. After a few hours we were able to wean him off to nasal cannula oxygen. Currently on 3 LPM  -CT did not show any significant pulmonary edema or pleural effusions     HTN Emergency - w/ SBP>180 and/or DBP>120 w/ associated pulmonary edema. Required immediate tx, started on nitro drip in the ED and now weaned off     ESRD -  on HD M/W/F. Nephrology consulted. Patient reports compliance with HD treatment      HTN/CAD - w/ known CAD but no evidence of active signs/sxs of ischemia/failure.  Currently controlled on home meds w/ vitals reviewed and documented as above.     Troponin elevation - chronic elevation due to ESRD status. No ischemic changes on EKG. No anginal symptoms     CHF - chronic systolic failure w/ reduced EF 25-30% by Echo dated Dec 2022. Likely due to ischemic and hypertensive heart disease. Patient is euvolemic w/ no evidence of acute decompensation. Continue current medical mgt. Afib - chronic paroxysmal of unspecified and clinically unable to determine etiology. Normally rate controlled on BBlocker - continued. Anticoagulated at baseline on home Eliquis due to secondary hypercoaguable state due to Afib - continued. Monitored on tele. Anemia - likely 2nd to CKD, w/out evidence of active bleeding/hemolysis. Stable and asymptomatic w/out indication for transfusion. Followed serial labs. Obesity -  With Body mass index is 33.52 kg/m². Complicating assessment and treatment. Placing patient at risk for multiple co-morbidities as well as early death and contributing to the patient's presentation. Counseled on weight loss. ZAINAB - likely 2nd to obesity. Controlled on home CPAP/BiPap - continued, w/ outpatient f/u as previously arranged. Labs: For convenience and continuity at follow-up the following most recent labs are provided:      CBC:    Lab Results   Component Value Date/Time    WBC 10.9 02/06/2023 05:04 AM    HGB 9.5 02/06/2023 05:04 AM    HCT 29.2 02/06/2023 05:04 AM     02/06/2023 05:04 AM       Renal:    Lab Results   Component Value Date/Time     02/06/2023 05:04 AM    K 5.9 02/06/2023 05:04 AM    K 5.3 01/30/2023 04:21 AM    CL 85 02/06/2023 05:04 AM    CO2 22 02/06/2023 05:04 AM    BUN 81 02/06/2023 05:04 AM    CREATININE 8.7 02/06/2023 05:04 AM    CALCIUM 9.1 02/06/2023 05:04 AM    PHOS 7.4 02/06/2023 05:04 AM         Significant Diagnostic Studies    Radiology:   XR CHEST (2 VW)   Final Result   CHF with pulmonary edema, unchanged.          IR REPLACE TUNNELED CVC WO SQ PORT/PUMP SAME ACCESS   Final Result   Successful exchange of the tunneled dialysis catheter         CT CHEST WO CONTRAST   Final Result   Small left pleural effusion which is much less prominent with moderate   atelectasis and consolidation along the left lung base extending into the   lingula which is less prominent      Small right pleural effusion layering posteriorly with moderate atelectasis   and consolidation posteriorly extending into the upper chest which is more   prominent. Mild pleural based nodule or scarring vs infiltrate along the right upper   lobe laterally and a 5 mm nodule along the left upper lobe which is   unchanged. Recommend follow-up. Mild mediastinal adenopathy which is more prominent. Suggest PET scan   correlation. Left subclavian catheter which is unchanged and position. 1.6 cm left adrenal adenoma which is unchanged. Fleischner Society guidelines for follow-up and management of incidentally   detected pulmonary nodules      Multiple Solid Nodules:      Nodule size less than 6 mm   In a low-risk patient, no routine follow-up. In a high-risk patient, optional CT at 12 months. Nodule size equals 6-8 mm   In a low-risk patient, CT at 3-6 months, then consider CT at 18-24 months. In a high-risk patient, CT at 3-6 months, then CT at 18-24 months. Nodule size greater than 8 mm   In a low-risk patient, CT at 3-6 months, then consider CT at 18-24 months. In a high-risk patient, CT at 3-6 months, then CT at 18-24 months. - Low risk patients include individuals with minimal or absent history of   smoking and other known risk factors. - High risk patients include individuals with a history or smoking or known   risk factors. Radiology 2017 http://pubs. rsna.org/doi/full/10.1148/radiol. 1293314034                Consults:     IP CONSULT TO HOSPITALIST  IP CONSULT TO NEPHROLOGY  IP CONSULT TO HEART FAILURE NURSE/COORDINATOR  IP CONSULT TO DIETITIAN  IP CONSULT TO HOME CARE NEEDS    Disposition: home     Condition at Discharge: Stable    Discharge Instructions/Follow-up:  w/ PCP 1-2 weeks and subspecialists as arranged. Code Status:  Full Code    Activity: activity as tolerated    Diet: regular diet      Discharge Medications:     Discharge Medication List as of 2/6/2023  4:13 PM             Details   docusate sodium (DOK) 100 MG capsule Take 1 capsule by mouth as needed for ConstipationDC to [facility]      sennosides-docusate sodium (SENOKOT-S) 8.6-50 MG tablet Take 1 tablet by mouth as needed for ConstipationDC to SNF      apixaban (ELIQUIS) 2.5 MG TABS tablet Take 1 tablet by mouth 2 times daily, Disp-60 tablet, R-0NO PRINT      gabapentin (NEURONTIN) 100 MG capsule Take 2 capsules by mouth 3 times daily as needed (neuropathic pain) for up to 10 days. , Disp-60 capsule, R-0Print      metoprolol succinate (TOPROL XL) 100 MG extended release tablet Take 1 tablet by mouth in the morning and at bedtime, Disp-30 tablet, R-3DC to SNF      cyclobenzaprine (FLEXERIL) 5 MG tablet Muscle spasmsDC to [facility]      QUEtiapine (SEROQUEL) 50 MG tablet TAKE 1 TABLET BY MOUTH EVERY EVENING, Disp-30 tablet, R-10Normal      isosorbide dinitrate (ISORDIL) 20 MG tablet Take 1 tablet by mouth 3 times daily, Disp-90 tablet, R-3Normal      clopidogrel (PLAVIX) 75 MG tablet Take 1 tablet by mouth daily, Disp-90 tablet, R-3Normal      pantoprazole (PROTONIX) 40 MG tablet TAKE 1 TABLET BY MOUTH EVERY MORNING BEFORE BREAKFAST, Disp-30 tablet, R-10Normal      DULoxetine (CYMBALTA) 30 MG extended release capsule TAKE 1 CAPSULE BY MOUTH EVERY DAY, Disp-30 capsule, R-10Normal      pravastatin (PRAVACHOL) 40 MG tablet Take 1 tablet by mouth daily, Disp-90 tablet, R-3Normal      B Complex-C-Folic Acid (VIRT-CAPS) 1 MG CAPS TAKE 1 CAPSULE BY MOUTH EVERY DAY, Disp-90 capsule, R-1Normal      Calcium Acetate, Phos Binder, 667 MG CAPS TAKE 1 CAPSULE BY MOUTH THREE TIMES DAILY WITH MEALS, Disp-90 capsule, R-3Normal nitroGLYCERIN (NITROSTAT) 0.4 MG SL tablet DISSOLVE 1 TABLET UNDER THE TONGUE AS NEEDED FOR CHEST PAIN EVERY 5 MINUTES UP TO 3 TIMES. IF NO RELIEF CALL 911., Disp-25 tablet, R-10Normal      vitamin D (ERGOCALCIFEROL) 99633 units CAPS capsule TK 1 C PO WEEKLY, R-11Historical Med      Alcohol Swabs PADS Disp-300 each, R-3, NormalUSE AS DIRECTED      ipratropium-albuterol (DUONEB) 0.5-2.5 (3) MG/3ML SOLN nebulizer solution Inhale 3 mLs into the lungs every 6 hours as needed for Shortness of Breath, Disp-360 mL, R-1Print      calcium carbonate (TUMS) 500 MG chewable tablet Take 1 tablet by mouth 3 times daily as needed for Heartburn. Historical Med             Time Spent on discharge is more than 35 minutes in the examination, evaluation, counseling and review of medications and discharge plan. Signed:    Paul Helms MD   2/6/2023      Thank you Burton Reddy MD for the opportunity to be involved in this patient's care. If you have any questions or concerns please feel free to contact me at [phone].

## 2023-02-06 NOTE — PROGRESS NOTES
Hospitalist Progress Note      PCP: Enrique Davenport MD    Date of Admission: 1/29/2023    Chief Complaint: SOB    Subjective: no new c/o. Medications:  Reviewed    Infusion Medications    sodium chloride       Scheduled Medications    apixaban  2.5 mg Oral BID    calcium acetate  1 capsule Oral TID WC    clopidogrel  75 mg Oral Daily    DULoxetine  30 mg Oral Daily    isosorbide dinitrate  20 mg Oral TID    metoprolol succinate  100 mg Oral BID    pantoprazole  40 mg Oral QAM AC    pravastatin  40 mg Oral Daily    QUEtiapine  50 mg Oral Nightly    sodium chloride flush  5-40 mL IntraVENous 2 times per day    epoetin siddharth-epbx  10,000 Units IntraVENous Q MWF    calcitRIOL  1.25 mcg Oral Q MWF     PRN Meds: midodrine, heparin (porcine), heparin (porcine), calcium carbonate, gabapentin, sodium chloride flush, sodium chloride, ondansetron **OR** ondansetron, polyethylene glycol, acetaminophen **OR** acetaminophen, ipratropium-albuterol, sennosides-docusate sodium, docusate sodium, loperamide      Intake/Output Summary (Last 24 hours) at 2/6/2023 1400  Last data filed at 2/6/2023 1251  Gross per 24 hour   Intake 360 ml   Output --   Net 360 ml         Physical Exam Performed:    BP (!) 152/72   Pulse 62   Temp 97.8 °F (36.6 °C) (Oral)   Resp 18   Ht 5' 9\" (1.753 m)   Wt 221 lb 9 oz (100.5 kg)   SpO2 98%   BMI 32.72 kg/m²     General appearance: No apparent distress, appears stated age and cooperative. HEENT: Pupils equal, round, and reactive to light. Conjunctivae/corneas clear. Neck: Supple, with full range of motion. No jugular venous distention. Trachea midline. Respiratory:  Normal respiratory effort. Clear to auscultation, bilaterally without Rales/Wheezes/Rhonchi. Cardiovascular: Regular rate and rhythm with normal S1/S2 without murmurs, rubs or gallops. Abdomen: Soft, non-tender, non-distended with normal bowel sounds. Musculoskeletal: No clubbing, cyanosis or edema bilaterally.   Full range of motion without deformity. Skin: Skin color, texture, turgor normal.  No rashes or lesions. Neurologic:  Neurovascularly intact without any focal sensory/motor deficits. Cranial nerves: II-XII intact, grossly non-focal.  Psychiatric: Alert and oriented, thought content appropriate, normal insight  Capillary Refill: Brisk,< 3 seconds   Peripheral Pulses: +2 palpable, equal bilaterally       Labs:   Recent Labs     02/04/23 0457 02/05/23 0447 02/06/23  0504   WBC 9.2 10.6 10.9   HGB 8.9* 9.5* 9.5*   HCT 27.3* 29.3* 29.2*    291 280       Recent Labs     02/04/23 0457 02/05/23 0447 02/06/23  0504   * 126* 125*   K 4.5 4.8 5.9*   CL 90* 88* 85*   CO2 27 23 22   BUN 46* 62* 81*   CREATININE 5.2* 7.1* 8.7*   CALCIUM 9.4 9.2 9.1   PHOS  --  5.8* 7.4*       No results for input(s): AST, ALT, BILIDIR, BILITOT, ALKPHOS in the last 72 hours. No results for input(s): INR in the last 72 hours. No results for input(s): [place] in the last 72 hours.       Urinalysis:      Lab Results   Component Value Date/Time    NITRU Negative 12/25/2022 05:00 AM    WBCUA 6-9 12/25/2022 05:00 AM    BACTERIA Rare 12/25/2022 05:00 AM    RBCUA 3-4 12/25/2022 05:00 AM    BLOODU SMALL 12/25/2022 05:00 AM    SPECGRAV 1.020 12/25/2022 05:00 AM    GLUCOSEU 500 12/25/2022 05:00 AM       Consults:    IP CONSULT TO HOSPITALIST  IP CONSULT TO NEPHROLOGY  IP CONSULT TO HEART FAILURE NURSE/COORDINATOR  IP CONSULT TO DIETITIAN  IP CONSULT TO HOME CARE NEEDS      Assessment/Plan:    Active Hospital Problems    Diagnosis     Ischemic cardiomyopathy [I25.5]      Priority: High    Dyslipidemia [E78.5]      Priority: High    DM2 (diabetes mellitus, type 2) ([facility] [address] [E11.9]      Priority: High    Hypertensive urgency [I16.0]      Priority: Medium    Acute respiratory failure with hypoxemia ([facility]) [J96.01]      Priority: Medium    HFrEF (heart failure with reduced ejection fraction) ([facility] [I50.20]      Priority: Medium    Asthma-COPD overlap syndrome ([facility] [J44.9]      Priority: Medium    PAF (paroxysmal atrial fibrillation) ([facility]) [I48.0]     ESRD (end stage renal disease) ([facility] [N18.6]     ZAINAB on CPAP [G47.33, Z99.89]          Acute on chronic respiratory failure with hypoxemia  Etiology is obscure, clinically he could have been in COPD exacerbation with reported wheezing on the field, however when I examined him his lungs are clear  -BiPAP initiated by squad, continued and ER and ICU. After a few hours we were able to wean him off to nasal cannula oxygen. Currently on 3 LPM  -CT did not show any significant pulmonary edema or pleural effusions    HTN Emergency - w/ SBP>180 and/or DBP>120 w/ associated pulmonary edema. Required immediate tx, started on nitro drip in the ED and now weaned off     ESRD -  on HD M/W/F. Nephrology consulted. Patient reports compliance with HD treatment      HTN/CAD - w/ known CAD but no evidence of active signs/sxs of ischemia/failure. Currently controlled on home meds w/ vitals reviewed and documented as above. Troponin elevation - chronic elevation due to ESRD status. No ischemic changes on EKG. No anginal symptoms     CHF - chronic systolic failure w/ reduced EF 25-30% by Echo dated Dec 2022. Likely due to ischemic and hypertensive heart disease. Patient is euvolemic w/ no evidence of acute decompensation. Continue current medical mgt. Afib - chronic paroxysmal of unspecified and clinically unable to determine etiology. Normally rate controlled on BBlocker - continued. Anticoagulated at baseline on home Eliquis due to secondary hypercoaguable state due to Afib - continued. Monitored on tele. Anemia - likely 2nd to CKD, w/out evidence of active bleeding/hemolysis. Stable and asymptomatic w/out indication for transfusion. Follow serial labs. Reviewed and documented as above. Obesity -  With Body mass index is 33.52 kg/m². Complicating assessment and treatment.  Placing patient at risk for multiple co-morbidities as well as early death and contributing to the patient's presentation. Counseled on weight loss. ZAINAB - likely 2nd to obesity. Controlled on home CPAP/BiPap - continued, w/ outpatient f/u as previously arranged. DVT Prophylaxis: Eliquis/IPC       Recent Labs     02/04/23  0457 02/05/23  0447 02/06/23  0504    291 280       Diet: ADULT DIET; Regular; Low Sodium (2 gm); Low Potassium (Less than 3000 mg/day); Low Phosphorus (Less than 1000 mg); 1200 ml  ADULT ORAL NUTRITION SUPPLEMENT; Breakfast, Dinner; Renal Oral Supplement  Code Status: Full Code       PT/OT Eval Status: not yet ordered.       Dispo - Patient is likely to remain in-house at least until [facility] 6/7 Feb pending clinical course and subspecialty Merna Smiley MD

## 2023-02-06 NOTE — CONSULTS
[facility] Wound Ostomy Continence Nurse  Consult Note       NAME:  Denae Raphael RECORD NUMBER:  [id number]  AGE: 45 y.o. GENDER: male  : 1970  TODAY'S DATE:  2023    Subjective; I'm waiting for wheelchair to leave. Reason for WOCN Evaluation and Assessment: ck rt foot large toe      Katheryn Ribera is a 45 y.o. male referred by:   [] Physician  [x] Nursing  [] Other:     Wound Identification:diabetic wounds toes  Wound Type: diabetic  Contributing Factors: diabetes, poor glucose control, chronic pressure, and shear force    Wound History: 45 y.o. male who presented to [facility] with above complaints  Patient with PMH of ESRD on hemodialysis MWF, chronic systolic CHF, s/p TAVR, CAD, HTN, DM2, asthma-COPD overlap presented to the ED with complaints of shortness of breath. Patient was recently evaluated in the ED on  for similar complaints. At that time he was given BiPAP therapy in the ED for a couple of hours and then weaned off of the BiPAP to 3 L oxygen via nasal cannula. Patient felt well and he was discharged home in stable condition. He reports that shortness of breath got worse earlier today and ended up calling EMS who placed the patient on CPAP prior to arrival to the ED. patient denied any chest pain.    Current Wound Care Treatment:  mepiliex    Patient Goal of Care:  [x] Wound Healing  [] Odor Control  [] Palliative Care  [x] Pain Control   [] Other:         PAST MEDICAL HISTORY        Diagnosis Date    Ambulatory dysfunction     walker for long distances, SOB with distance    Aortic stenosis     echo     Arthritis     hands and hips    Asthma     Bilateral hilar adenopathy syndrome 6/3/2013    CAD (coronary artery disease)     Dr. Kendal Duque [facility]) 2019    EF= 43%    CHF (congestive heart failure) ([facility] [address]     Chronic pain     COPD (chronic obstructive pulmonary disease) [facility])     pulmonology Dr. Maricruz Lewis    Depression     Diabetes mellitus ([address]     borderline    Difficult intravenous access     Emphysema of lung (HCC)     ESRD (end stage renal disease) on dialysis ([address]     MWF    Fear of needles     Gastric ulcer     GERD (gastroesophageal reflux disease)     Heart valve problem     bicuspic valve    Hemodialysis patient ([address]     History of spinal fracture     work incident    Hx of blood clots     Bilateral lower extremities; stents in place    Hyperlipidemia     Hypertension     MI (myocardial infarction) ([address] 2019    has had 9 MIs. 2019 was the last    Neuromuscular disorder ([address]     due to CVA    Numbness and tingling in left arm     from fistula    Pneumonia     PONV (postoperative nausea and vomiting)     Prolonged emergence from general anesthesia     States requires more medication than most people    Sleep apnea     Uses CPAP    Stroke ([address]     7mm thalamic cva 2017 deficts left side, left side weakness    TIA (transient ischemic attack)     Unspecified diseases of blood and blood-forming organs        PAST SURGICAL HISTORY    Past Surgical History:   Procedure Laterality Date    AORTIC VALVE REPLACEMENT N/A 10/15/2019    TRANSCATHETER AORTIC VALVE REPLACEMENT FEMORAL APPROACH performed by Daniela Matos MD at [address] [facility] Right 7/2/2019    PERITONEAL DIALYSIS CATHETER REMOVAL performed by Junior Mortensen MD at [address] [place] [address]  2/29/2015    WN    CORONARY ANGIOPLASTY WITH STENT PLACEMENT  05/26/15    CYST REMOVAL  08/14/2013    EXCISION CYSTS, NECK X2 AND ABDOMINAL benign    DIAGNOSTIC CARDIAC CATH LAB PROCEDURE      DIALYSIS FISTULA CREATION Left 10/30/2017    LEFT BRACHIAL CEPHALIC FISTULA    DIALYSIS FISTULA CREATION Left 3/27/2019    LIGATION  AV FISTULA performed by Manuela Collins MD at [address] [facility], DIAGNOSTIC      IR TUNNELED CATHETER PLACEMENT GREATER THAN 5 YEARS  3/21/2022    IR TUNNELED CATHETER PLACEMENT GREATER THAN 5 YEARS 3/21/2022 [facility]  PROCEDURES    IR TUNNELED CATHETER PLACEMENT GREATER THAN 5 YEARS  4/21/2022    IR TUNNELED CATHETER PLACEMENT GREATER THAN 5 YEARS 4/21/2022 [facility] PROCEDURES    IR TUNNELED CATHETER PLACEMENT GREATER THAN 5 YEARS  4/26/2022    IR TUNNELED CATHETER PLACEMENT GREATER THAN 5 YEARS 4/26/2022 [facility] PROCEDURES    IR TUNNELED CATHETER PLACEMENT GREATER THAN 5 YEARS  6/23/2022    IR TUNNELED CATHETER PLACEMENT GREATER THAN 5 YEARS 6/23/2022 [facility] PROCEDURES    OTHER SURGICAL HISTORY  02/01/2017    laparoscopic cholecystectomy with intraoperative cholangiogram    OTHER SURGICAL HISTORY  2018    PORT PLACEMENT  - vas cath    OTHER SURGICAL HISTORY Bilateral 06/26/2018    laprascopic peritoneal dialysis catheter placement    OTHER SURGICAL HISTORY Right 09/2018    peritoneal dialysis port placed on right side of abdomen    OTHER SURGICAL HISTORY  05/28/2019    PTA/Stenting R External Iliac artery    OR LAPS INSERTION TUNNELED INTRAPERITONEAL CATHETER N/A 9/21/2018    LAPAROSCOPIC PERITONEAL DIALYSIS CATHETER REPLACEMENT performed by Lili Griffith MD at [address] [facility] [address] 2/24/2022    PERINEAL ABCESS DRAINAGE performed by Lili Griffith MD at [address] [place] [address] N/A 10/2/2022    THORACENTESIS ULTRASOUND performed by Julia Goins MD at [address]  01/06/2016    UPPER GASTROINTESTINAL ENDOSCOPY  01/29/2017    possible candida, otherwise normal appearing    VASCULAR SURGERY  aprx 2 years ago    2 stents placed, each side of groin       FAMILY HISTORY    Family History   Problem Relation Age of Onset    Diabetes Mother     Heart Disease Father     Kidney Disease Sister         stage 4-kidney failure    Cancer Sister     Heart Disease Sister     Obesity Sister     Cancer Sister     Heart Disease Sister     Obesity Sister     Alcohol Abuse Brother        SOCIAL HISTORY    Social History     Tobacco Use Smoking status: Former     Packs/day: 0.50     Years: 33.00     Pack years: 16.50     Types: Cigarettes     Quit date: 2020     Years since quittin.7    Smokeless tobacco: Never    Tobacco comments:     States quit 2021   Vaping Use    Vaping Use: Never used   Substance Use Topics    Alcohol use: Not Currently     Alcohol/week: 0.0 standard drinks     Comment: occ    Drug use: No       ALLERGIES    Allergies   Allergen Reactions    Morphine Nausea And Vomiting       MEDICATIONS    No current facility-administered medications on file prior to encounter. Current Outpatient Medications on File Prior to Encounter   Medication Sig Dispense Refill    docusate sodium (DOK) 100 MG capsule Take 1 capsule by mouth as needed for Constipation      sennosides-docusate sodium (SENOKOT-S) 8.6-50 MG tablet Take 1 tablet by mouth as needed for Constipation      apixaban (ELIQUIS) 2.5 MG TABS tablet Take 1 tablet by mouth 2 times daily 60 tablet 0    gabapentin (NEURONTIN) 100 MG capsule Take 2 capsules by mouth 3 times daily as needed (neuropathic pain) for up to 10 days.  60 capsule 0    metoprolol succinate (TOPROL XL) 100 MG extended release tablet Take 1 tablet by mouth in the morning and at bedtime 30 tablet 3    cyclobenzaprine (FLEXERIL) 5 MG tablet Muscle spasms      QUEtiapine (SEROQUEL) 50 MG tablet TAKE 1 TABLET BY MOUTH EVERY EVENING 30 tablet 10    isosorbide dinitrate (ISORDIL) 20 MG tablet Take 1 tablet by mouth 3 times daily 90 tablet 3    clopidogrel (PLAVIX) 75 MG tablet Take 1 tablet by mouth daily 90 tablet 3    pantoprazole (PROTONIX) 40 MG tablet TAKE 1 TABLET BY MOUTH EVERY MORNING BEFORE BREAKFAST 30 tablet 10    DULoxetine (CYMBALTA) 30 MG extended release capsule TAKE 1 CAPSULE BY MOUTH EVERY DAY 30 capsule 10    pravastatin (PRAVACHOL) 40 MG tablet Take 1 tablet by mouth daily 90 tablet 3    B Complex-C-Folic Acid (VIRT-CAPS) 1 MG CAPS TAKE 1 CAPSULE BY MOUTH EVERY DAY 90 capsule 1 Calcium Acetate, Phos Binder, 667 MG CAPS TAKE 1 CAPSULE BY MOUTH THREE TIMES DAILY WITH MEALS 90 capsule 3    nitroGLYCERIN (NITROSTAT) 0.4 MG SL tablet DISSOLVE 1 TABLET UNDER THE TONGUE AS NEEDED FOR CHEST PAIN EVERY 5 MINUTES UP TO 3 TIMES. IF NO RELIEF CALL 911. 25 tablet 10    vitamin D (ERGOCALCIFEROL) 57776 units CAPS capsule TK 1 C PO WEEKLY  11    Alcohol Swabs PADS USE AS DIRECTED 300 each 3    ipratropium-albuterol (DUONEB) 0.5-2.5 (3) MG/3ML SOLN nebulizer solution Inhale 3 mLs into the lungs every 6 hours as needed for Shortness of Breath 360 mL 1    calcium carbonate (TUMS) 500 MG chewable tablet Take 1 tablet by mouth 3 times daily as needed for Heartburn. Objective; sitting up on bedside.     BP (!) 152/72   Pulse 62   Temp 97.8 °F (36.6 °C) (Oral)   Resp 18   Ht 5' 9\" (1.753 m)   Wt 221 lb 9 oz (100.5 kg)   SpO2 98%   BMI 32.72 kg/m²     LABS:  WBC:    Lab Results   Component Value Date/Time    WBC 10.9 02/06/2023 05:04 AM     H/H:    Lab Results   Component Value Date/Time    HGB 9.5 02/06/2023 05:04 AM    HCT 29.2 02/06/2023 05:04 AM     PTT:    Lab Results   Component Value Date/Time    APTT 28.2 10/18/2022 09:58 PM   [APTT}  PT/INR:    Lab Results   Component Value Date/Time    PROTIME 14.6 02/01/2023 02:21 PM    INR 1.15 02/01/2023 02:21 PM     HgBA1c:    Lab Results   Component Value Date/Time    LABA1C 6.9 12/25/2022 11:12 AM       Assessment; rt hallux and 4th toe wounds slough eschar   Isaac Risk Score: Isaac Scale Score: 19    Patient Active Problem List   Diagnosis    Sepsis without acute organ dysfunction (HCC)    CHF (congestive heart failure) ([facility])    Asthma-COPD overlap syndrome ([facility])    CAD (coronary artery disease)    PVD (peripheral vascular disease) ([facility])    Bicuspid aortic valve    Bilateral hilar adenopathy syndrome    Claudication in peripheral vascular disease ([facility])    Uncontrolled hypertension    Diabetic neuropathy ([facility])    DM2 (diabetes mellitus, type 2) ([facility] [address]    Passive smoke exposure    Depression with anxiety    PNA (pneumonia)    Obesity (BMI 30-39.9)    ZAINAB on CPAP    Degeneration of lumbar or lumbosacral intervertebral disc    Lumbar radiculopathy    Lumbosacral spondylosis without myelopathy    Biliary colic    Symptomatic cholelithiasis    Gastroparesis due to DM ([facility])    Angina, class IV ([facility])    Dyspnea    Dyslipidemia    Acute on chronic combined systolic and diastolic heart failure ([facility])    Ischemic cardiomyopathy    Tobacco abuse    CVA (cerebral vascular accident) ([facility] [address]    History of CVA (cerebrovascular accident)    Type 2 diabetes mellitus without complication, without long-term current use of insulin ([facility])    ZAINAB (obstructive sleep apnea)    Pleural effusion on left    Chronic anemia    Nonrheumatic aortic valve stenosis    Mucus plugging of bronchi    Hemodialysis-associated hypotension    ESRD (end stage renal disease) ([facility])    Hypotension due to drugs    Acute diastolic CHF (congestive heart failure) ([facility])    Neuromuscular disorder ([facility])    Renovascular hypertension    Mixed hyperlipidemia    Cigarette nicotine dependence in remission    Pulmonary edema    Fluid overload    Anemia of chronic disease    SOB (shortness of breath) on exertion    Steal syndrome of dialysis vascular access ([facility])    Chronic, continuous use of opioids    Chronic bronchitis ([facility])    Nasal congestion    Hypercholesteremia    Bradycardia    History of transcatheter aortic valve replacement (TAVR)    Syncope and collapse    PAF (paroxysmal atrial fibrillation) ([facility])    Bilateral leg weakness    GBS (Guillain-[place] syndrome) ([facility])    Sinus pause    Weakness of both lower extremities    Sinus bradycardia    Ataxia    Peripheral vascular occlusive disease ([facility])    Cellulitis of right foot    Iliac artery occlusion, right ([facility])    Cellulitis and abscess of hand    Uncontrolled type 2 diabetes mellitus with hyperglycemia ([facility] [address]    Acute encephalopathy    Acute hypoxemic respiratory failure (HCC)    Acute CVA (cerebrovascular accident) ([facility] [address]    Speech problem    Urinary tract infection with hematuria    Respiratory failure with hypoxia (HCC)    Acute respiratory failure with hypercapnia (HCC)    Acute pulmonary edema (HCC)    Grade II diastolic dysfunction    Shock circulatory (HCC)    Smoker    Normocytic normochromic anemia    NSTEMI (non-ST elevated myocardial infarction) (HCC)    SIRS (systemic inflammatory response syndrome) (HCC)    Atrial flutter (HCC)    Liberty-rectal abscess    Somnolence    Pulmonary edema with diastolic CHF, NYHA class 3 (HCC)    Respiratory failure (HCC)    Former smoker    Cardiomyopathy ([facility] [address]    Morbid obesity with BMI of 40.0-44.9, adult (HCC)    Hypoglycemia    Altered mental status    Hypertensive emergency    SOB (shortness of breath)    Acute respiratory failure with hypoxia and hypercapnia (HCC)    HFrEF (heart failure with reduced ejection fraction) (HCC)    Pericardial effusion    Hypervolemia    Acute pneumonia    Acute on chronic respiratory failure with hypoxia (HCC)    Compression atelectasis    Type 2 myocardial infarction (HCC)    Acute respiratory failure with hypoxemia (HCC)    Hyperkalemia    Hyponatremia    Metabolic acidemia    Pulmonary infiltrate    Leukocytosis    Hypertensive urgency       Measurements:  Wound 08/30/21 Toe (Comment  which one) Right Great Toe (Active)   Number of days: 525       Wound 01/12/22 Pedal Anterior;Right Healing scab from previous blister (per pt), flaky edges (Active)   Number of days: 390       Wound 01/12/22 Toe (Comment  which one) Anterior;Right Scab from old blister (per pt) on 4th toe, flaky edges (Active)   Number of days: 390       Wound 02/03/23 Toe (Comment  which one) Distal see under wound type (Active)   Wound Image   02/06/23 1559   Wound Etiology Diabetic 02/06/23 1559   Dressing Status New dressing applied 02/06/23 1559   Wound Cleansed Cleansed with saline 02/06/23 1552 Dressing/Treatment Alginate with Ag;Gauze dressing/dressing sponge 02/06/23 1559   Dressing Change Due 02/07/23 02/06/23 1559   Wound Length (cm) 0.5 cm 02/06/23 1559   Wound Width (cm) 0.5 cm 02/06/23 1559   Wound Depth (cm) 0.2 cm 02/06/23 1559   Wound Surface Area (cm^2) 0.25 cm^2 02/06/23 1559   Change in Wound Size % (l*w) 55.56 02/06/23 1559   Wound Volume (cm^3) 0.05 cm^3 02/06/23 1559   Wound Assessment Eschar moist;Slough 02/06/23 1559   Drainage Amount Scant 02/06/23 1559   Drainage Description Sanguinous 02/06/23 1559   Odor None 02/06/23 1559   Liberty-wound Assessment Denuded 02/06/23 1559   Margins Undefined edges 02/06/23 1559   Wound Thickness Description not for Pressure Injury Full thickness 02/06/23 1559   Number of days: 2    Rt hallux       Wound 02/03/23 Toe (Comment  which one) small open area (Active)   Wound Etiology Diabetic 02/06/23 1559   Dressing Status New dressing applied 02/06/23 1559   Wound Cleansed Cleansed with saline 02/06/23 1559   Dressing/Treatment Alginate with Ag;Gauze dressing/dressing sponge 02/06/23 1559   Dressing Change Due 02/07/23 02/06/23 1559   Wound Length (cm) 0.3 cm 02/06/23 1559   Wound Width (cm) 0.3 cm 02/06/23 1559   Wound Depth (cm) 0.1 cm 02/06/23 1559   Wound Surface Area (cm^2) 0.09 cm^2 02/06/23 1559   Change in Wound Size % (l*w) 76 02/06/23 1559   Wound Volume (cm^3) 0.009 cm^3 02/06/23 1559   Wound Assessment Slough 02/06/23 1559   Drainage Amount Scant 02/06/23 1559   Drainage Description Sanguinous 02/06/23 1559   Odor None 02/06/23 1559   Liberty-wound Assessment Fragile 02/06/23 1559   Margins Undefined edges 02/06/23 1559   Wound Thickness Description not for Pressure Injury Partial thickness 02/06/23 1559   Number of days: 2     Incision 02/24/22 Perineum (Active)   Number of days: 346         Response to treatment:  Well tolerated by patient.      Pain Assessment:  Severity:  0 / 10  Quality of pain: N/A  Wound Pain Timing/Severity: none  Premedicated: No    Plan   Plan of Care: Wound 02/03/23 Toe (Comment  which one) Distal see under wound type-Dressing/Treatment: Alginate with Ag, Gauze dressing/dressing sponge  Wound 02/03/23 Toe (Comment  which one) small open area-Dressing/Treatment: Alginate with Ag, Gauze dressing/dressing sponge    Daily cleanse right large toe and 4th toe with normal saline, pat dry apply Alginate Ag, cover with gauze and tape. Discharge Plan:  Placement for patient upon discharge: home with support    Patient appropriate for Outpatient [address]: Yes    Referrals:  [x]  / discharge planner discharging  [] [facility]  [] Supplies  [] Other    Patient/Caregiver Teaching:Explained dressing and foot care.   Level of patient/caregiver understanding able to:   [] Indicates understanding       [] Needs reinforcement  [] Unsuccessful      [x] Verbal Understanding  [] Demonstrated understanding       [] No evidence of learning  [] Refused teaching         [] N/A       Electronically signed by Jacquelyn Badillo RN, BSN on 2/6/2023 at 4:03 PM

## 2023-02-06 NOTE — FLOWSHEET NOTE
02/06/23 0732 02/06/23 1120   Treatment   Time On 0740  --    Time Off  --  1111   Vital Signs   Temp 97.4 °F (36.3 °C) 97.3 °F (36.3 °C)   Heart Rate 59 50   Resp 18 18   Weight 227 lb 1.2 oz (103 kg) 221 lb 9 oz (100.5 kg)   Percent Weight Change 0.03 -2.43   Dry Weight 219 lb 5.7 oz (99.5 kg)  --    Post-Hemodialysis Assessment   Blood Volume Processed (Liters)  --  60.9 l/min   Dialyzer Clearance  --  Lightly streaked   Duration of Treatment (minutes)  --  210 minutes   Treatment time: 3.5 hours   Net UF:  2470  ml     Pre weight: 103 kg ( Stand-up)  Post weight: 99.5     kg  EDW: 99.5 kg     Access used: Left chest wall TDC  Access function: Good with  ml/min     Medications or blood products given: Retacrit 10,000 units, midodrine 10mg PO,  heparin dwells to line     Regular outpatient schedule: [facility]     Summary of response to treatment: Tolerated tx fair, symptomatic hypotension began half way through tx, MD ordered midodrine 10mg PO and pressure still dropped to 80s, 90s. Goal reduced, MD made aware, tolerated rest of tx. Dialysis record faxed to floor to be placed in chart and report given to floor RN,Lamar Fluler.

## 2023-02-06 NOTE — CARE COORDINATION
CASE MANAGEMENT DISCHARGE SUMMARY      Discharge to: home with [facility]         IMM given: (date) 2/6/23         Transportation:    Family/car:        Confirmed discharge plan with:     Patient: yes      Family:  no- pt called wife        Facility/Agency, name:  [facility]        RN, name: Kelly Milton    Note: Discharging nurse to complete CELINE, reconcile AVS, and place final copy with patient's discharge packet. RN to ensure that written prescriptions for  Level II medications are sent with patient to the facility as per protocol.

## 2023-02-06 NOTE — PROGRESS NOTES
02/05/23 2111   NIV Type   Skin Protection for O2 Device N/A  (pt declines mepilex)   NIV Started/Stopped On   Equipment Type v60   Mode Bilevel   Mask Type Full face mask   Settings/Measurements   PIP Observed 17 cm H20   IPAP 14 cmH20   CPAP/EPAP 6 cmH2O   Vt (Measured) 928 mL   Rate Ordered 10   Resp 14   Insp Rise Time (%) 3 %   FiO2  28 %   I Time/ I Time % 1 s   Minute Volume (L/min) 12.8 Liters   Mask Leak (lpm) 50 lpm  (pt hasbeard)   Comfort Level Good   Using Accessory Muscles No   SpO2 100   Patient's Home Machine No   Alarm Settings   Alarms On Y   Low Pressure (cmH2O) 6 cmH2O   High Pressure (cmH2O) 30 cmH2O   Delay Alarm 20 sec(s)   RR Low (bpm) 6   RR High (bpm) 40 br/min   Oxygen Therapy/Pulse Ox   Heart Rate 57   SpO2 100 %

## 2023-02-06 NOTE — PROGRESS NOTES
[facility]  Nephrology Follow up Note           Reason for Consult: ESRD  Requesting Physician:  Dr. Catherine Ndiaye history  Seen and examined at HD on 2/6 w 3l UFG, pre wt 103 kg ; midodrine 10 mg given  IUF on 2/4 with 3.4 liters removed, post-weight of 98.4 kg. HD on 2/3 with 1 L UF, post-weight of  99.3 kg; pt felt light headed when tried to challenge his TW  UF w 4l over 2.5h on 2/3; post wt 99.6 kg   HD on 2/1 c/b by catheter dysfunction ultimately required exchange, had ~4l net UF over 3h, post wt 104.5 kg   HD on 1/30 w 2.5l UF over 2h as the system clotted twice during HD , post wt 101 kg   States shortness of breath better. ROS: Intake adequate, no fevers. PSFH: No visitor    Scheduled Meds:   apixaban  2.5 mg Oral BID    calcium acetate  1 capsule Oral TID WC    clopidogrel  75 mg Oral Daily    DULoxetine  30 mg Oral Daily    isosorbide dinitrate  20 mg Oral TID    metoprolol succinate  100 mg Oral BID    pantoprazole  40 mg Oral QAM AC    pravastatin  40 mg Oral Daily    QUEtiapine  50 mg Oral Nightly    sodium chloride flush  5-40 mL IntraVENous 2 times per day    epoetin siddharth-epbx  10,000 Units IntraVENous Q MWF    calcitRIOL  1.25 mcg Oral Q MWF     Continuous Infusions:   sodium chloride       PRN Meds:.midodrine, heparin (porcine), heparin (porcine), calcium carbonate, gabapentin, sodium chloride flush, sodium chloride, ondansetron **OR** ondansetron, polyethylene glycol, acetaminophen **OR** acetaminophen, ipratropium-albuterol, sennosides-docusate sodium, docusate sodium, loperamide    History of Present Illness on 1/30/2023:    45 y.o. yo male with PMH of ESRD, CHF on baseline home oxygen at 3 L, s/p TAVR, CAD, HTN, DM2, asthma-COPD who is admitted for increase shortness of breath  Patient reports that he has been feeling short of breath since Friday. He completed his dialysis for still felt short of breath. He was needing up to 5 to 6 L of oxygen at home.   He presented to the emergency room with increasing shortness of breath initially requiring BiPAP with 3 L of oxygen and has been admitted to ICU. Physical exam:   Constitutional:  VITALS:  /76   Pulse 59   Temp 97.4 °F (36.3 °C) (Oral)   Resp 18   Ht 5' 9\" (1.753 m)   Wt 227 lb 1.2 oz (103 kg)   SpO2 99%   BMI 33.53 kg/m²   Gen: alert, awake  Neck: No JVD  Skin: Unremarkable  Cardiovascular:  S1, S2 without m/r/g   Respiratory: CTA B without w/r/r; respiratory effort normal  Abdomen:  soft, nt, nd,   Extremities: no lower extremity edema  Neuro/Psy: AAoriented times 3 ; moves all 4 ext    Data/  Recent Labs     02/04/23 0457 02/05/23 0447 02/06/23  0504   WBC 9.2 10.6 10.9   HGB 8.9* 9.5* 9.5*   HCT 27.3* 29.3* 29.2*   MCV 91.8 93.5 92.5    291 280       Recent Labs     02/04/23 0457 02/05/23 0447 02/06/23  0504   * 126* 125*   K 4.5 4.8 5.9*   CL 90* 88* 85*   CO2 27 23 22   GLUCOSE 228* 281* 175*   PHOS  --  5.8* 7.4*   MG 2.20  --   --    BUN 46* 62* 81*   CREATININE 5.2* 7.1* 8.7*   LABGLOM 12* 8* 7*         Assessment  -ESRD on HD Monday Wednesday Friday  -Acute on chronic respiratory failure  -Hypertensive urgency on nitro drip, has been weaned off this morning during my rounds  -CKD/MBD  -Hyperkalemia  -Anemia  -hyponatremia from fluid abuse     Plan  -HD as per MWF schedule   Target weight 99.5 kg or lower as tolerated   Extra UF session as OP for 2 weeks than reavlauate , dw op unit at [facility]   Strictly counseled about fluid, na and  k intake as pt gained 4.6 kg since Saturday w k up to 5.9 n na down to 125  -DAVON 10K units as ordered qMWF  -Calcitriol 1.25 mcg Monday Wednesday Friday  -serial labs  -renal diet    Thank you for the consultation. Please do not hesitate to call with questions. Chance Rossi MD  Office: [phone]  Fax:    [phone]  [facility]

## 2023-02-06 NOTE — PROGRESS NOTES
02/06/23 0411   NIV Type   NIV Started/Stopped On   Equipment Type v60   Mode Bilevel   Settings/Measurements   PIP Observed 14 cm H20   IPAP 14 cmH20   CPAP/EPAP 6 cmH2O   Vt (Measured) 473 mL   Rate Ordered 10   Resp 16   Insp Rise Time (%) 3 %   FiO2  28 %   I Time/ I Time % 1 s   Minute Volume (L/min) 7.5 Liters   Mask Leak (lpm) 55 lpm   Comfort Level Good   Using Accessory Muscles No   SpO2 95   Patient's Home Machine No   Alarm Settings   Alarms On Y   Low Pressure (cmH2O) 6 cmH2O   High Pressure (cmH2O) 30 cmH2O   Delay Alarm 20 sec(s)   RR Low (bpm) 6   RR High (bpm) 40 br/min

## 2023-02-06 NOTE — PROGRESS NOTES
Comprehensive Nutrition Assessment    Type and Reason for Visit:  Reassess    Nutrition Recommendations/Plan:   Continue low sodium, low potassium, low phosphorus diet with 1200 mL FR   Continue Nepro BID   Monitor nutrition adequacy, pertinent labs, bowel habits, wt changes, and clinical progress     Malnutrition Assessment:  Malnutrition Status: At risk for malnutrition (Comment) (02/06/23 4894)      Nutrition Assessment:    Follow up: Pt nutritionally improving AEB increased PO intakes. Pt with PO intakes of % per EMR. Pt reports good appetite today, did not get lunch this afternoon. RD to order lunch for pt. Favorable to current ONS, drinks daily at home. Encouraged continued good PO intakes. Wound care consulted. Will continue to monitor. Nutrition Related Findings:    -281 past 24 hrs. Active BS. BM on 2/3. Wound Type: Wound Consult Pending       Current Nutrition Intake & Therapies:    Average Meal Intake: %  Average Supplements Intake: %  ADULT DIET; Regular; Low Sodium (2 gm); Low Potassium (Less than 3000 mg/day); Low Phosphorus (Less than 1000 mg); 1200 ml  ADULT ORAL NUTRITION SUPPLEMENT; Breakfast, Dinner; Renal Oral Supplement    Anthropometric Measures:  Height: 5' 9\" (175.3 cm)  Ideal Body Weight (IBW): 160 lbs (73 kg)    Admission Body Weight: 228 lb 3 oz (103.5 kg)  Current Body Weight: 221 lb (100.2 kg),   IBW. Weight Source: Standing Scale  Current BMI (kg/m2): 32.6                          BMI Categories: Obese Class 1 (BMI 30.0-34. 9)    Estimated Daily Nutrient Needs:  Energy Requirements Based On: Kcal/kg  Weight Used for Energy Requirements: Ideal  Energy (kcal/day): 3018-3979 (25-30 kcal/72.7 kg)  Weight Used for Protein Requirements: Ideal  Protein (g/day):  (1.2-1.5 g/75.5 kg)  Method Used for Fluid Requirements: Standard Renal    Nutrition Diagnosis:   Increased nutrient needs related to increase demand for energy/nutrients, impaired respiratory function as evidenced by dialysis, BMI    Nutrition Interventions:   Food and/or Nutrient Delivery: Continue Current Diet, Continue Oral Nutrition Supplement  Nutrition Education/Counseling:  (monitor need when medically appropriate)  Coordination of Nutrition Care: Continue to monitor while inpatient       Goals:  Previous Goal Met: Progressing toward Goal(s)  Goals: PO intake 50% or greater, prior to discharge       Nutrition Monitoring and Evaluation:      Food/Nutrient Intake Outcomes: Food and Nutrient Intake, Supplement Intake  Physical Signs/Symptoms Outcomes: Chewing or Swallowing, Nutrition Focused Physical Findings, Biochemical Data, Fluid Status or Edema    Discharge Planning:    Continue current diet, Continue Oral Nutrition Supplement     Logan aZvaleta MS, RD, LD  Contact: [phone]

## 2023-02-07 RX ORDER — PRAVASTATIN SODIUM 40 MG
40 TABLET ORAL DAILY
Qty: 90 TABLET | Refills: 2 | Status: SHIPPED | OUTPATIENT
Start: 2023-02-07

## 2023-02-07 RX ORDER — CLOPIDOGREL BISULFATE 75 MG/1
75 TABLET ORAL DAILY
Qty: 90 TABLET | Refills: 2 | Status: SHIPPED | OUTPATIENT
Start: 2023-02-07

## 2023-02-09 ENCOUNTER — FOLLOWUP TELEPHONE ENCOUNTER (OUTPATIENT)
Dept: TELEMETRY | Age: 53
End: 2023-02-09

## 2023-02-09 NOTE — TELEPHONE ENCOUNTER
2nd Attempt; No Answer- Left HIPAA compliant voicemail with Non-Urgent Heart Failure Resource Line number for call back.       Rick Vences, RN

## 2023-02-09 NOTE — TELEPHONE ENCOUNTER
1st Attempt; No Answer- Left HIPAA compliant voicemail with Non-Urgent Heart Failure Resource Line number for call back.      Rick Vences, RN

## 2023-02-19 DIAGNOSIS — G89.4 CHRONIC PAIN SYNDROME: ICD-10-CM

## 2023-02-19 RX ORDER — CYCLOBENZAPRINE HCL 10 MG
TABLET ORAL
Qty: 90 TABLET | Refills: 2 | OUTPATIENT
Start: 2023-02-19

## 2023-02-21 DIAGNOSIS — F32.A DEPRESSION, UNSPECIFIED DEPRESSION TYPE: ICD-10-CM

## 2023-02-21 RX ORDER — DULOXETIN HYDROCHLORIDE 30 MG/1
CAPSULE, DELAYED RELEASE ORAL
Qty: 30 CAPSULE | Refills: 10 | OUTPATIENT
Start: 2023-02-21

## 2023-03-10 ENCOUNTER — APPOINTMENT (OUTPATIENT)
Dept: GENERAL RADIOLOGY | Age: 53
DRG: 871 | End: 2023-03-10
Payer: MEDICARE

## 2023-03-10 ENCOUNTER — HOSPITAL ENCOUNTER (INPATIENT)
Age: 53
LOS: 6 days | Discharge: HOME HEALTH | DRG: 871 | End: 2023-03-16
Attending: EMERGENCY MEDICINE | Admitting: INTERNAL MEDICINE
Payer: MEDICARE

## 2023-03-10 DIAGNOSIS — R77.8 ELEVATED TROPONIN: ICD-10-CM

## 2023-03-10 DIAGNOSIS — J18.9 PNEUMONIA DUE TO INFECTIOUS ORGANISM, UNSPECIFIED LATERALITY, UNSPECIFIED PART OF LUNG: Primary | ICD-10-CM

## 2023-03-10 DIAGNOSIS — D64.9 ANEMIA, UNSPECIFIED TYPE: ICD-10-CM

## 2023-03-10 DIAGNOSIS — J96.00 ACUTE RESPIRATORY FAILURE, UNSPECIFIED WHETHER WITH HYPOXIA OR HYPERCAPNIA (HCC): ICD-10-CM

## 2023-03-10 DIAGNOSIS — A41.9 SEPTICEMIA (HCC): ICD-10-CM

## 2023-03-10 PROBLEM — R65.20 SEVERE SEPSIS (HCC): Status: ACTIVE | Noted: 2023-03-10

## 2023-03-10 LAB
A/G RATIO: 1.1 (ref 1.1–2.2)
ALBUMIN SERPL-MCNC: 3.9 G/DL (ref 3.4–5)
ALP BLD-CCNC: 60 U/L (ref 40–129)
ALT SERPL-CCNC: 11 U/L (ref 10–40)
ANION GAP SERPL CALCULATED.3IONS-SCNC: 24 MMOL/L (ref 3–16)
AST SERPL-CCNC: 20 U/L (ref 15–37)
BASE EXCESS VENOUS: -6.5 MMOL/L (ref -3–3)
BASOPHILS ABSOLUTE: 0 K/UL (ref 0–0.2)
BASOPHILS RELATIVE PERCENT: 0.3 %
BILIRUB SERPL-MCNC: 0.3 MG/DL (ref 0–1)
BUN BLDV-MCNC: 81 MG/DL (ref 7–20)
CALCIUM SERPL-MCNC: 9.3 MG/DL (ref 8.3–10.6)
CARBOXYHEMOGLOBIN: 3.9 % (ref 0–1.5)
CHLORIDE BLD-SCNC: 86 MMOL/L (ref 99–110)
CO2: 18 MMOL/L (ref 21–32)
CREAT SERPL-MCNC: 7.8 MG/DL (ref 0.9–1.3)
EOSINOPHILS ABSOLUTE: 0 K/UL (ref 0–0.6)
EOSINOPHILS RELATIVE PERCENT: 0.2 %
GFR SERPL CREATININE-BSD FRML MDRD: 8 ML/MIN/{1.73_M2}
GLUCOSE BLD-MCNC: 363 MG/DL (ref 70–99)
GLUCOSE BLD-MCNC: 368 MG/DL (ref 70–99)
HCO3 VENOUS: 18.9 MMOL/L (ref 23–29)
HCT VFR BLD CALC: 35 % (ref 40.5–52.5)
HEMOGLOBIN: 11.3 G/DL (ref 13.5–17.5)
INFLUENZA A: NOT DETECTED
INFLUENZA B: NOT DETECTED
LACTIC ACID, SEPSIS: 2 MMOL/L (ref 0.4–1.9)
LYMPHOCYTES ABSOLUTE: 0.3 K/UL (ref 1–5.1)
LYMPHOCYTES RELATIVE PERCENT: 1.6 %
MCH RBC QN AUTO: 29.5 PG (ref 26–34)
MCHC RBC AUTO-ENTMCNC: 32.2 G/DL (ref 31–36)
MCV RBC AUTO: 91.5 FL (ref 80–100)
METHEMOGLOBIN VENOUS: 0.2 %
MONOCYTES ABSOLUTE: 0.2 K/UL (ref 0–1.3)
MONOCYTES RELATIVE PERCENT: 1.5 %
NEUTROPHILS ABSOLUTE: 15.7 K/UL (ref 1.7–7.7)
NEUTROPHILS RELATIVE PERCENT: 96.4 %
O2 SAT, VEN: 93 %
O2 THERAPY: ABNORMAL
PCO2, VEN: 37.3 MMHG (ref 40–50)
PDW BLD-RTO: 16.4 % (ref 12.4–15.4)
PERFORMED ON: ABNORMAL
PH VENOUS: 7.32 (ref 7.35–7.45)
PLATELET # BLD: 287 K/UL (ref 135–450)
PMV BLD AUTO: 7.6 FL (ref 5–10.5)
PO2, VEN: 74.1 MMHG (ref 25–40)
POTASSIUM REFLEX MAGNESIUM: 5.4 MMOL/L (ref 3.5–5.1)
PRO-BNP: ABNORMAL PG/ML (ref 0–124)
RBC # BLD: 3.82 M/UL (ref 4.2–5.9)
SARS-COV-2 RNA, RT PCR: NOT DETECTED
SODIUM BLD-SCNC: 128 MMOL/L (ref 136–145)
TCO2 CALC VENOUS: 20 MMOL/L
TOTAL PROTEIN: 7.6 G/DL (ref 6.4–8.2)
TROPONIN: 0.34 NG/ML
WBC # BLD: 16.3 K/UL (ref 4–11)

## 2023-03-10 PROCEDURE — 87636 SARSCOV2 & INF A&B AMP PRB: CPT

## 2023-03-10 PROCEDURE — 87040 BLOOD CULTURE FOR BACTERIA: CPT

## 2023-03-10 PROCEDURE — 6370000000 HC RX 637 (ALT 250 FOR IP): Performed by: INTERNAL MEDICINE

## 2023-03-10 PROCEDURE — 83880 ASSAY OF NATRIURETIC PEPTIDE: CPT

## 2023-03-10 PROCEDURE — 2580000003 HC RX 258: Performed by: INTERNAL MEDICINE

## 2023-03-10 PROCEDURE — 5A1D70Z PERFORMANCE OF URINARY FILTRATION, INTERMITTENT, LESS THAN 6 HOURS PER DAY: ICD-10-PCS | Performed by: INTERNAL MEDICINE

## 2023-03-10 PROCEDURE — 83605 ASSAY OF LACTIC ACID: CPT

## 2023-03-10 PROCEDURE — 99223 1ST HOSP IP/OBS HIGH 75: CPT | Performed by: INTERNAL MEDICINE

## 2023-03-10 PROCEDURE — 2580000003 HC RX 258: Performed by: EMERGENCY MEDICINE

## 2023-03-10 PROCEDURE — 99285 EMERGENCY DEPT VISIT HI MDM: CPT

## 2023-03-10 PROCEDURE — 87150 DNA/RNA AMPLIFIED PROBE: CPT

## 2023-03-10 PROCEDURE — 96374 THER/PROPH/DIAG INJ IV PUSH: CPT

## 2023-03-10 PROCEDURE — 6370000000 HC RX 637 (ALT 250 FOR IP): Performed by: NURSE PRACTITIONER

## 2023-03-10 PROCEDURE — 87077 CULTURE AEROBIC IDENTIFY: CPT

## 2023-03-10 PROCEDURE — 2060000000 HC ICU INTERMEDIATE R&B

## 2023-03-10 PROCEDURE — 93005 ELECTROCARDIOGRAM TRACING: CPT | Performed by: EMERGENCY MEDICINE

## 2023-03-10 PROCEDURE — 94761 N-INVAS EAR/PLS OXIMETRY MLT: CPT

## 2023-03-10 PROCEDURE — 2700000000 HC OXYGEN THERAPY PER DAY

## 2023-03-10 PROCEDURE — 6360000002 HC RX W HCPCS: Performed by: EMERGENCY MEDICINE

## 2023-03-10 PROCEDURE — 84484 ASSAY OF TROPONIN QUANT: CPT

## 2023-03-10 PROCEDURE — 94660 CPAP INITIATION&MGMT: CPT

## 2023-03-10 PROCEDURE — 6360000002 HC RX W HCPCS: Performed by: INTERNAL MEDICINE

## 2023-03-10 PROCEDURE — 85025 COMPLETE CBC W/AUTO DIFF WBC: CPT

## 2023-03-10 PROCEDURE — 71045 X-RAY EXAM CHEST 1 VIEW: CPT

## 2023-03-10 PROCEDURE — 82803 BLOOD GASES ANY COMBINATION: CPT

## 2023-03-10 PROCEDURE — 90935 HEMODIALYSIS ONE EVALUATION: CPT

## 2023-03-10 PROCEDURE — 80053 COMPREHEN METABOLIC PANEL: CPT

## 2023-03-10 RX ORDER — HEPARIN SODIUM 1000 [USP'U]/ML
3600 INJECTION, SOLUTION INTRAVENOUS; SUBCUTANEOUS PRN
Status: DISCONTINUED | OUTPATIENT
Start: 2023-03-10 | End: 2023-03-16 | Stop reason: HOSPADM

## 2023-03-10 RX ORDER — TORSEMIDE 100 MG/1
TABLET ORAL
COMMUNITY
Start: 2023-03-02

## 2023-03-10 RX ORDER — PANTOPRAZOLE SODIUM 40 MG/1
40 TABLET, DELAYED RELEASE ORAL
Status: DISCONTINUED | OUTPATIENT
Start: 2023-03-11 | End: 2023-03-16 | Stop reason: HOSPADM

## 2023-03-10 RX ORDER — METOPROLOL SUCCINATE 50 MG/1
100 TABLET, EXTENDED RELEASE ORAL 2 TIMES DAILY
Status: DISCONTINUED | OUTPATIENT
Start: 2023-03-10 | End: 2023-03-16 | Stop reason: HOSPADM

## 2023-03-10 RX ORDER — SODIUM CHLORIDE 9 MG/ML
25 INJECTION, SOLUTION INTRAVENOUS PRN
Status: DISCONTINUED | OUTPATIENT
Start: 2023-03-10 | End: 2023-03-16 | Stop reason: HOSPADM

## 2023-03-10 RX ORDER — SODIUM CHLORIDE 0.9 % (FLUSH) 0.9 %
5-40 SYRINGE (ML) INJECTION PRN
Status: DISCONTINUED | OUTPATIENT
Start: 2023-03-10 | End: 2023-03-16 | Stop reason: HOSPADM

## 2023-03-10 RX ORDER — PRAVASTATIN SODIUM 40 MG
40 TABLET ORAL DAILY
Status: DISCONTINUED | OUTPATIENT
Start: 2023-03-11 | End: 2023-03-16 | Stop reason: HOSPADM

## 2023-03-10 RX ORDER — DEXTROSE MONOHYDRATE 100 MG/ML
INJECTION, SOLUTION INTRAVENOUS CONTINUOUS PRN
Status: DISCONTINUED | OUTPATIENT
Start: 2023-03-10 | End: 2023-03-16 | Stop reason: HOSPADM

## 2023-03-10 RX ORDER — ISOSORBIDE DINITRATE 10 MG/1
20 TABLET ORAL 3 TIMES DAILY
Status: DISCONTINUED | OUTPATIENT
Start: 2023-03-10 | End: 2023-03-16 | Stop reason: HOSPADM

## 2023-03-10 RX ORDER — ACETAMINOPHEN 325 MG/1
650 TABLET ORAL EVERY 6 HOURS PRN
Status: DISCONTINUED | OUTPATIENT
Start: 2023-03-10 | End: 2023-03-13

## 2023-03-10 RX ORDER — LORAZEPAM 1 MG/1
1 TABLET ORAL ONCE
Status: COMPLETED | OUTPATIENT
Start: 2023-03-10 | End: 2023-03-10

## 2023-03-10 RX ORDER — INSULIN LISPRO 100 [IU]/ML
0-8 INJECTION, SOLUTION INTRAVENOUS; SUBCUTANEOUS
Status: DISCONTINUED | OUTPATIENT
Start: 2023-03-11 | End: 2023-03-14

## 2023-03-10 RX ORDER — NITROGLYCERIN 20 MG/100ML
5-200 INJECTION INTRAVENOUS CONTINUOUS
Status: DISCONTINUED | OUTPATIENT
Start: 2023-03-10 | End: 2023-03-16 | Stop reason: HOSPADM

## 2023-03-10 RX ORDER — QUETIAPINE FUMARATE 25 MG/1
50 TABLET, FILM COATED ORAL NIGHTLY
Status: DISCONTINUED | OUTPATIENT
Start: 2023-03-10 | End: 2023-03-16 | Stop reason: HOSPADM

## 2023-03-10 RX ORDER — DULOXETIN HYDROCHLORIDE 30 MG/1
30 CAPSULE, DELAYED RELEASE ORAL DAILY
Status: DISCONTINUED | OUTPATIENT
Start: 2023-03-11 | End: 2023-03-13

## 2023-03-10 RX ORDER — SODIUM CHLORIDE 0.9 % (FLUSH) 0.9 %
5-40 SYRINGE (ML) INJECTION EVERY 12 HOURS SCHEDULED
Status: DISCONTINUED | OUTPATIENT
Start: 2023-03-10 | End: 2023-03-16 | Stop reason: HOSPADM

## 2023-03-10 RX ORDER — ACETAMINOPHEN 650 MG/1
650 SUPPOSITORY RECTAL EVERY 6 HOURS PRN
Status: DISCONTINUED | OUTPATIENT
Start: 2023-03-10 | End: 2023-03-13

## 2023-03-10 RX ORDER — CLOPIDOGREL BISULFATE 75 MG/1
75 TABLET ORAL DAILY
Status: DISCONTINUED | OUTPATIENT
Start: 2023-03-11 | End: 2023-03-16 | Stop reason: HOSPADM

## 2023-03-10 RX ORDER — HEPARIN SODIUM 1000 [USP'U]/ML
INJECTION, SOLUTION INTRAVENOUS; SUBCUTANEOUS
Status: COMPLETED
Start: 2023-03-10 | End: 2023-03-11

## 2023-03-10 RX ORDER — LIDOCAINE HYDROCHLORIDE 10 MG/ML
5 INJECTION, SOLUTION INFILTRATION; PERINEURAL ONCE
Status: DISCONTINUED | OUTPATIENT
Start: 2023-03-10 | End: 2023-03-16 | Stop reason: HOSPADM

## 2023-03-10 RX ORDER — CYCLOBENZAPRINE HCL 10 MG
TABLET ORAL
Status: ON HOLD | COMMUNITY
Start: 2023-01-19 | End: 2023-03-10

## 2023-03-10 RX ORDER — SODIUM CHLORIDE 9 MG/ML
INJECTION, SOLUTION INTRAVENOUS PRN
Status: DISCONTINUED | OUTPATIENT
Start: 2023-03-10 | End: 2023-03-16 | Stop reason: HOSPADM

## 2023-03-10 RX ORDER — CHOLECALCIFEROL (VITAMIN D3) 10 MCG
1 TABLET ORAL DAILY
Status: DISCONTINUED | OUTPATIENT
Start: 2023-03-10 | End: 2023-03-16 | Stop reason: HOSPADM

## 2023-03-10 RX ORDER — HEPARIN SODIUM 5000 [USP'U]/ML
5000 INJECTION, SOLUTION INTRAVENOUS; SUBCUTANEOUS EVERY 8 HOURS SCHEDULED
Status: DISCONTINUED | OUTPATIENT
Start: 2023-03-10 | End: 2023-03-10

## 2023-03-10 RX ORDER — INSULIN LISPRO 100 [IU]/ML
0-4 INJECTION, SOLUTION INTRAVENOUS; SUBCUTANEOUS NIGHTLY
Status: DISCONTINUED | OUTPATIENT
Start: 2023-03-10 | End: 2023-03-14

## 2023-03-10 RX ORDER — IPRATROPIUM BROMIDE AND ALBUTEROL SULFATE 2.5; .5 MG/3ML; MG/3ML
1 SOLUTION RESPIRATORY (INHALATION) EVERY 6 HOURS PRN
Status: DISCONTINUED | OUTPATIENT
Start: 2023-03-10 | End: 2023-03-16 | Stop reason: HOSPADM

## 2023-03-10 RX ADMIN — SACUBITRIL AND VALSARTAN 1 TABLET: 24; 26 TABLET, FILM COATED ORAL at 20:53

## 2023-03-10 RX ADMIN — QUETIAPINE FUMARATE 50 MG: 25 TABLET ORAL at 20:53

## 2023-03-10 RX ADMIN — CEFEPIME 2000 MG: 2 INJECTION, POWDER, FOR SOLUTION INTRAVENOUS at 12:48

## 2023-03-10 RX ADMIN — NEPHROCAP 1 MG: 1 CAP ORAL at 20:53

## 2023-03-10 RX ADMIN — LORAZEPAM 1 MG: 1 TABLET ORAL at 20:57

## 2023-03-10 RX ADMIN — ACETAMINOPHEN 650 MG: 325 TABLET ORAL at 20:53

## 2023-03-10 RX ADMIN — HEPARIN SODIUM 3600 UNITS: 1000 INJECTION INTRAVENOUS; SUBCUTANEOUS at 17:43

## 2023-03-10 RX ADMIN — APIXABAN 2.5 MG: 5 TABLET, FILM COATED ORAL at 20:53

## 2023-03-10 RX ADMIN — SODIUM CHLORIDE, PRESERVATIVE FREE 10 ML: 5 INJECTION INTRAVENOUS at 21:18

## 2023-03-10 RX ADMIN — Medication 10 ML: at 20:52

## 2023-03-10 RX ADMIN — METOPROLOL SUCCINATE 100 MG: 50 TABLET, EXTENDED RELEASE ORAL at 20:53

## 2023-03-10 RX ADMIN — ISOSORBIDE DINITRATE 20 MG: 20 TABLET ORAL at 20:53

## 2023-03-10 ASSESSMENT — PAIN - FUNCTIONAL ASSESSMENT
PAIN_FUNCTIONAL_ASSESSMENT: NONE - DENIES PAIN

## 2023-03-10 ASSESSMENT — ENCOUNTER SYMPTOMS
RHINORRHEA: 0
COUGH: 1
EYE REDNESS: 0
ABDOMINAL PAIN: 0
SORE THROAT: 0
SHORTNESS OF BREATH: 1

## 2023-03-10 ASSESSMENT — PAIN SCALES - GENERAL: PAINLEVEL_OUTOF10: 8

## 2023-03-10 ASSESSMENT — PAIN DESCRIPTION - LOCATION: LOCATION: BACK

## 2023-03-10 NOTE — H&P
Hospital Medicine History & Physical      PCP: Kathleen Rodriguez MD    Date of Admission: 3/10/2023    Date of Service: Pt seen/examined on 3/10/2023 and Admitted to Inpatient with expected LOS greater than two midnights due to medical therapy. Chief Complaint:  SOB      History Of Present Illness:     45 y.o. male PMH of ESRD on hemodialysis MWF, chronic systolic CHF, s/p TAVR, CAD, HTN, DM2, asthma-COPD overlap presented to the ED with complaints of shortness of breath. He reports for the past few nights he has been waking up unable to breath. Cannot lie flat. Does have cough and clear sputum production. Reports feeling warm at night but no recorded fevers. Due to worsening SOB he called EMS. En Route he had some chest discomfort that is no longer present. In ED he was placed on BiPAP and given duoneb and solumedrol in route. Still short of breath when tried to be placed on 5L NC.        Past Medical History:          Diagnosis Date    Ambulatory dysfunction     walker for long distances, SOB with distance    Aortic stenosis     echo 2017    Arthritis     hands and hips    Asthma     Bilateral hilar adenopathy syndrome 6/3/2013    [facility]onary artery [facility]    EF= 43%    CHF (congestive heart failure) ([address]     Chronic pain     COPD (chronic obstructive pulmonary disease) ([address]     pulmonology Dr. Rebeca Bacon    Depression     Diabetes mellitus ([address]     borderline    Difficult intravenous access     Emphysema of lung ([address]     ESRD (end stage renal disease) on dialysis ([address]     MWF    Fear of needles     Gastric ulcer     GERD (gastroesophageal reflux disease)     Heart valve problem     bicuspic valve    Hemodialysis patient ([address]     History of spinal fracture     work incident    Hx of blood clots     Bilateral lower extremities; stents in place    Hyperlipidemia     Hypertension     MI (myocardial infarction) ([address] 2019    has had 9 MIs. 2019 was the last    Neuromuscular disorder ([address]     due to CVA    Numbness and tingling in left arm     from fistula    Pneumonia     PONV (postoperative nausea and vomiting)     Prolonged emergence from general anesthesia     States requires more medication than most people    Sleep apnea     Uses CPAP    Stroke ([address]     7mm thalamic cva 2017 deficts left side, left side weakness    TIA (transient ischemic attack)     Unspecified diseases of blood and blood-forming organs        Past Surgical History:          Procedure Laterality Date    AORTIC VALVE REPLACEMENT N/A 10/15/2019    TRANSCATHETER AORTIC VALVE REPLACEMENT FEMORAL APPROACH performed by Mulugeta Garcia MD at [facility] 7/2/2019    PERITONEAL DIALYSIS CATHETER REMOVAL performed by Deo De Leon MD at [address] [facility]  2/29/2015    WN    CORONARY ANGIOPLASTY WITH STENT PLACEMENT  05/26/15    CYST REMOVAL  08/14/2013    EXCISION CYSTS, NECK X2 AND ABDOMINAL benign    DIAGNOSTIC CARDIAC CATH LAB PROCEDURE      DIALYSIS FISTULA CREATION Left 10/30/2017    LEFT BRACHIAL CEPHALIC FISTULA    DIALYSIS FISTULA CREATION Left 3/27/2019    LIGATION  AV FISTULA performed by Hunter Johansen MD at [address] [facility] [address], COLON, DIAGNOSTIC      IR TUNNELED CATHETER PLACEMENT GREATER THAN 5 YEARS  3/21/2022    IR TUNNELED CATHETER PLACEMENT GREATER THAN 5 YEARS 3/21/2022 [facility]    IR TUNNELED CATHETER PLACEMENT GREATER THAN 5 YEARS  4/21/2022    IR TUNNELED CATHETER PLACEMENT GREATER THAN 5 YEARS 4/21/2022 [facility]    IR TUNNELED CATHETER PLACEMENT GREATER THAN 5 YEARS  4/26/2022    IR TUNNELED CATHETER PLACEMENT GREATER THAN 5 YEARS 4/26/2022 [facility]    IR TUNNELED CATHETER PLACEMENT GREATER THAN 5 YEARS  6/23/2022    IR TUNNELED CATHETER PLACEMENT GREATER THAN 5 YEARS 6/23/2022 [facility]    OTHER SURGICAL HISTORY  02/01/2017    laparoscopic cholecystectomy with intraoperative cholangiogram    OTHER SURGICAL HISTORY  2018    PORT PLACEMENT  - vas cath    OTHER SURGICAL HISTORY Bilateral 06/26/2018    laprascopic peritoneal dialysis catheter placement    OTHER SURGICAL HISTORY Right 09/2018    peritoneal dialysis port placed on right side of abdomen    OTHER SURGICAL HISTORY  05/28/2019    PTA/Stenting R External Iliac artery    DC LAPS INSERTION TUNNELED INTRAPERITONEAL CATHETER N/A 9/21/2018    LAPAROSCOPIC PERITONEAL DIALYSIS CATHETER REPLACEMENT performed by Yaz Garduno MD at [address] [facility] [address] 2/24/2022    PERINEAL ABCESS DRAINAGE performed by Yaz Garduno MD at [address] [place] [address] N/A 10/2/2022    THORACENTESIS ULTRASOUND performed by Kiana Reynolds MD at [address]  01/06/2016    UPPER GASTROINTESTINAL ENDOSCOPY  01/29/2017    possible candida, otherwise normal appearing    VASCULAR SURGERY  aprx 2 years ago    2 stents placed, each side of groin       Medications Prior to Admission:      Prior to Admission medications    Medication Sig Start Date End Date Taking? Authorizing Provider   pravastatin (PRAVACHOL) 40 MG tablet TAKE 1 TABLET BY MOUTH DAILY 2/7/23   Laney Hoffman APRN - CNP   clopidogrel (PLAVIX) 75 MG tablet TAKE 1 TABLET BY MOUTH DAILY 2/7/23   Laney Hoffman, APRN - CNP   docusate sodium (DOK) 100 MG capsule Take 1 capsule by mouth as needed for Constipation 12/13/22   Yuli Wilkins DO   sennosides-docusate sodium (SENOKOT-S) 8.6-50 MG tablet Take 1 tablet by mouth as needed for Constipation 12/13/22   Yuli Wilkins DO   apixaban (ELIQUIS) 2.5 MG TABS tablet Take 1 tablet by mouth 2 times daily 11/1/22   Laney Hoffman, APRN - CNP   gabapentin (NEURONTIN) 100 MG capsule Take 2 capsules by mouth 3 times daily as needed (neuropathic pain) for up to 10 days.  10/20/22 10/30/22  Sabrina Fairbanks MD   metoprolol succinate (TOPROL XL) 100 MG extended release tablet Take 1 tablet by mouth in the morning and at bedtime 7/21/22   Trinidad Hinkle MD   cyclobenzaprine (FLEXERIL) 5 MG tablet Muscle spasms 7/21/22   Trinidad Hinkle MD   QUEtiapine (SEROQUEL) 50 MG tablet TAKE 1 TABLET BY MOUTH EVERY EVENING 6/30/22   Lexi Ashraf MD   isosorbide dinitrate (ISORDIL) 20 MG tablet Take 1 tablet by mouth 3 times daily 6/8/22   Marianna Pang PA   pantoprazole (PROTONIX) 40 MG tablet TAKE 1 TABLET BY MOUTH EVERY MORNING BEFORE BREAKFAST 4/28/22   Lexi Ashraf MD   DULoxetine (CYMBALTA) 30 MG extended release capsule TAKE 1 CAPSULE BY MOUTH EVERY DAY 3/29/22   Lexi Ashraf MD   B Complex-C-Folic Acid (VIRT-CAPS) 1 MG CAPS TAKE 1 CAPSULE BY MOUTH EVERY DAY 9/20/21   Lexi Ashraf MD   Calcium Acetate, Phos Binder, 667 MG CAPS TAKE 1 CAPSULE BY MOUTH THREE TIMES DAILY WITH MEALS 8/12/21   Lexi Ashraf MD   nitroGLYCERIN (NITROSTAT) 0.4 MG SL tablet DISSOLVE 1 TABLET UNDER THE TONGUE AS NEEDED FOR CHEST PAIN EVERY 5 MINUTES UP TO 3 TIMES. IF NO RELIEF CALL 911. 1/7/21   Lexi Ashraf MD   vitamin D (ERGOCALCIFEROL) 15100 units CAPS capsule TK 1 C PO WEEKLY 6/2/19   Historical Provider, MD   Alcohol Swabs PADS USE AS DIRECTED 4/25/18   Donovan Gallagher MD   ipratropium-albuterol (DUONEB) 0.5-2.5 (3) MG/3ML SOLN nebulizer solution Inhale 3 mLs into the lungs every 6 hours as needed for Shortness of Breath 10/15/17   Isis Cherry MD   calcium carbonate (TUMS) 500 MG chewable tablet Take 1 tablet by mouth 3 times daily as needed for Heartburn. Historical Provider, MD       Allergies:  Morphine    Social History:      The patient currently lives at home    TOBACCO:   reports that he quit smoking about 2 years ago. His smoking use included cigarettes. He has a 16.50 pack-year smoking history. He has never used smokeless tobacco.  ETOH:   reports that he does not currently use alcohol.   E-cigarette/Vaping Questions Responses    E-cigarette/Vaping Use Never User    Start Date     Passive Exposure     Quit Date     Counseling Given     Comments               Family History:       Reviewed and negative in regards to presenting illness/complaint.        Problem Relation Age of Onset    Diabetes Mother     Heart Disease Father     Kidney Disease Sister         stage 4-kidney failure    Cancer Sister     Heart Disease Sister     Obesity Sister     Cancer Sister     Heart Disease Sister     Obesity Sister     Alcohol Abuse Brother        REVIEW OF SYSTEMS COMPLETED:   Pertinent positives as noted in the HPI. All other systems reviewed and negative.    PHYSICAL EXAM PERFORMED:    BP (!) 159/92   Pulse 100   Temp 97.2 °F (36.2 °C) (Axillary)   Resp 22   Ht 5' 9\" (1.753 m)   Wt 221 lb (100.2 kg)   SpO2 100%   BMI 32.64 kg/m²     General appearance:  Mild distress, appears stated age and cooperative.  HEENT:  Normal cephalic, atraumatic without obvious deformity. Pupils equal, round, and reactive to light.  Extra ocular muscles intact. Conjunctivae/corneas clear.  Neck: Supple, with full range of motion. Trachea midline.  Respiratory:  increased respiratory effort. Rales in left lung base and decreased breath sounds.   Cardiovascular:  tachycardic with normal S1/S2 without murmurs, rubs or gallops.  Abdomen: Soft, non-tender, non-distended with normal bowel sounds.  Musculoskeletal:  No clubbing, cyanosis or edema bilaterally.  Full range of motion without deformity.  Skin: Multiple abrasions on right foot/toes. Dried blood   Neurologic:  Neurovascularly intact without any focal sensory/motor deficits. Cranial nerves: II-XII intact, grossly non-focal.  Psychiatric:  Alert and oriented, thought content appropriate, normal insight  Capillary Refill: Brisk,3 seconds, normal  Peripheral Pulses: +2 palpable, equal bilaterally       Labs:     Recent Labs     03/10/23  1142   WBC 16.3*   HGB 11.3*   HCT 35.0*     Recent Labs     03/10/23  0920   *   K 5.4*   CL 86*   CO2 18*   BUN 81*   CREATININE 7.8*   CALCIUM 9.3     Recent Labs     03/10/23  0920   AST 20   ALT 11   BILITOT 0.3   ALKPHOS 60     No results for input(s): INR in the last 72 hours. Recent Labs     03/10/23  0920   TROPONINI 0.34*       Urinalysis:      Lab Results   Component Value Date/Time    NITRU Negative 12/25/2022 05:00 AM    WBCUA 6-9 12/25/2022 05:00 AM    BACTERIA Rare 12/25/2022 05:00 AM    RBCUA 3-4 12/25/2022 05:00 AM    BLOODU SMALL 12/25/2022 05:00 AM    SPECGRAV 1.020 12/25/2022 05:00 AM    GLUCOSEU 500 12/25/2022 05:00 AM       Radiology:     CXR: I have reviewed the CXR with the following interpretation: Left pleural effusion. Vascular congestion. Consolidative airspace disase in left lung base   EKG:  I have reviewed the EKG with the following interpretation: Sinus tachycardia, . Qtc 470. No ST segment changes     XR CHEST PORTABLE   Preliminary Result   Left pleural effusion. Mild vascular congestion. Consolidative airspace   disease in the left lung base which could represent infiltrate or mass. Follow up to resolution is suggested. XR CHEST (2 VW)    (Results Pending)       Consults:    IP CONSULT TO NEPHROLOGY  IP CONSULT TO CARDIOLOGY  IP CONSULT TO PALLIATIVE CARE    ASSESSMENT:    Active Hospital Problems    Diagnosis Date Noted    Severe sepsis ([facility] [A41.9, R65.20] 03/10/2023     Priority: Medium         PLAN:  Severe sepsis secondary to Pneumonia, likely Gram +,   With lactic acidosis   Continue cefepime  CXR with left pleural effusion which could be from volume overload. But with WBC elevated and tachycardia and CXR will cover for pneumonia.    Procal pending   Repeat lactic acid     Acute on chronic respiratory failure with hypoxemia  BiPAP initiated   Evidence by increased respiratory effort  Likely from volume overload > pneumonia  Nephro consulted   Repeat CXR tomorrow     Troponin elevation higher than baseline, Will repeat. Non ischemic findings on EKG. Likely from demand ischemia from sepsis and fluid overload. Repeat troponin. Repeat EKG in AM      ESRD -  on HD M/W/F. Nephrology consulted. Patient reports compliance with HD treatment     Right foot wound - trauma from hitting wall. Wound care     HTN/CAD - w/ known CAD but no evidence of active signs/sxs of ischemia/failure. Currently controlled on home meds w/ vitals reviewed and documented as above. Acute on chronic systolic failure w/ reduced EF 25-30% by Echo dated Dec 2022. Likely due to ischemic and hypertensive heart disease. Cardiology consulted. BNP > 58883. Afib - chronic paroxysmal of unspecified and clinically unable to determine etiology. Normally rate controlled on BBlocker - continued. Anticoagulated at baseline on home Eliquis due to secondary hypercoaguable state due to Afib - continued. Monitored on tele. Anemia - likely 2nd to CKD, w/out evidence of active bleeding/hemolysis. Stable and asymptomatic w/out indication for transfusion. Followed serial labs. Obesity -  With Body mass index is 32.64 kg/m². Complicating assessment and treatment. Placing patient at risk for multiple co-morbidities as well as early death and contributing to the patient's presentation. Counseled on weight loss. ZAINAB - likely 2nd to obesity. Controlled on home CPAP/BiPap - continued, w/ outpatient f/u as previously arranged. Management plan - frequent hospital visits. Consult nephrology, CHF RN, [place], Palliative care    DVT Prophylaxis: Eliquis  Diet: ADULT DIET; Regular; Low Sodium (2 gm); Low Potassium (Less than 3000 mg/day); 1500 ml  Code Status: Full Code    PT/OT Eval Status: Not indicated    Dispo - > 2 midnights       Maria Dolores Amado DO    Thank you Santiago Clark MD for the opportunity to be involved in this patient's care.  If you have any questions or concerns please feel free to contact me at [phone].

## 2023-03-10 NOTE — ED PROVIDER NOTES
[facility] C[facility] PCU     EMERGENCY DEPARTMENT ENCOUNTER            Pt Name: Yariel Diaz   MRN: [id number]   [place] 1970   Date of evaluation: 3/10/2023   Provider: Vic Souza MD   PCP: Jose Thibodeaux MD   Note Started: 8:53 AM EST 3/10/23          CHIEF COMPLAINT     Chief Complaint   Patient presents with    Shortness of Breath     64 room air per EMS. SOB since 0100. 3.5L baseline O2 requirement. 125 solu, 1duoneb via EMS. Arrived on CPAP. 95% on baseline O2 but incased WOB. HISTORY OF PRESENT ILLNESS:   History from : Patient   Limitations to history : None     Yariel Diaz is a 45 y.o. male who presents with shortness of breath. This patient has a past medical history of COPD and congestive heart failure. He states about 4 hours prior to arrival he had abrupt worsening of his chronic shortness of breath. He normally wears 3-3 and half liters of oxygen at home. No fever or chills. He states on the ride here he had a little bit of chest discomfort. EMS reports that he did not report any chest pain in them. They did give him a DuoNeb and Solu-Medrol 125 mg IV. Patient denies any leg swelling. Belly pain. Fever. Chills. Nursing Notes were all reviewed and agreed with, or any disagreements were addressed in the HPI. REVIEW OF SYSTEMS :    Review of Systems   Constitutional:  Negative for fever. HENT:  Negative for rhinorrhea and sore throat. Eyes:  Negative for redness. Respiratory:  Positive for cough and shortness of breath. Cardiovascular:  Negative for chest pain. Gastrointestinal:  Negative for abdominal pain. Genitourinary:  Negative for flank pain. Neurological:  Negative for headaches. Hematological:  Negative for adenopathy. Psychiatric/Behavioral:  Negative for confusion.        MEDICAL HISTORY   has a past medical history of Ambulatory dysfunction, Aortic stenosis, Arthritis, Asthma, 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/2013), CAD (coronary artery disease), Cardiomyopathy ([address] (04/19/2019), CHF (congestive heart failure) ([address] Chronic pain, COPD (chronic obstructive pulmonary disease) ([address] Depression, Diabetes mellitus ([address] Difficult intravenous access, Emphysema of lung ([address] ESRD (end stage renal disease) on dialysis ([address] Fear of needles, Gastric ulcer, GERD (gastroesophageal reflux disease), Heart valve problem, Hemodialysis patient ([address] History of spinal fracture, blood clots, Hyperlipidemia, Hypertension, MI (myocardial infarction) ([address] (2019), Neuromuscular disorder ([address] Numbness and tingling in left arm, Pneumonia, PONV (postoperative nausea and vomiting), Prolonged emergence from general anesthesia, Sleep apnea, Stroke ([address] TIA (transient ischemic attack), and Unspecified diseases of blood and blood-forming organs.     Past Surgical History:   Procedure Laterality Date    AORTIC VALVE REPLACEMENT N/A 10/15/2019    TRANSCATHETER AORTIC VALVE REPLACEMENT FEMORAL APPROACH performed by Lobo Cagle MD at [address] [facility] Right 7/2/2019    PERITONEAL DIALYSIS CATHETER REMOVAL performed by Liam Salazar MD at [address] [place] [address]  2/29/2015    [facility]    CORONARY ANGIOPLASTY WITH STENT PLACEMENT  05/26/15    CYST REMOVAL  08/14/2013    EXCISION CYSTS, NECK X2 AND ABDOMINAL benign    DIAGNOSTIC CARDIAC CATH LAB PROCEDURE      DIALYSIS FISTULA CREATION Left 10/30/2017    LEFT BRACHIAL CEPHALIC FISTULA    DIALYSIS FISTULA CREATION Left 3/27/2019    LIGATION  AV FISTULA performed by Jazmin Bellamy MD at [id number] [place] [address], DIAGNOSTIC      IR TUNNELED CATHETER PLACEMENT GREATER THAN 5 YEARS  3/21/2022    IR TUNNELED CATHETER PLACEMENT GREATER THAN 5 YEARS 3/21/2022 [facility]    IR TUNNELED CATHETER PLACEMENT GREATER THAN 5 YEARS  4/21/2022    IR TUNNELED CATHETER PLACEMENT GREATER THAN 5 YEARS 4/21/2022 [facility] PROCED[facility]    IR TUNNELED CATHETER PLACEMENT GREATER THAN 5 YEARS  4/26/2022    IR TUNNELED CATHETER PLACEMENT GREATER THAN 5 YEARS 4/26/2022 [facility] SPECIAL PROCEDURES    IR TUNNELED CATHETER PLACEMENT GREATER THAN 5 YEARS  6/23/2022    IR TUNNELED CATHETER PLACEMENT GREATER THAN 5 YEARS 6/23/2022 [facility] PROCED[facility]    OTHER SURGICAL HISTORY  02/01/2017    laparoscopic cholecystectomy with intraoperative cholangiogram    OTHER SURGICAL HISTORY  2018    PORT PLACEMENT  - vas cath    OTHER SURGICAL HISTORY Bilateral 06/26/2018    laprascopic peritoneal dialysis catheter placement    OTHER SURGICAL HISTORY Right 09/2018    peritoneal dialysis port placed on right side of abdomen    OTHER SURGICAL HISTORY  05/28/2019    PTA/Stenting R External Iliac artery    MO LAPS INSERTION TUNNELED INTRAPERITONEAL CATHETER N/A 9/21/2018    LAPAROSCOPIC PERITONEAL DIALYSIS CATHETER REPLACEMENT performed by Ruddy Gomez MD at [address] [facility] [address] 2/24/2022    PERINEAL ABCESS DRAINAGE performed by Ruddy Gomez MD at [address] [place] [address] N/A 10/2/2022    THORACENTESIS ULTRASOUND performed by Jackie Palacios MD at [address] [facility] [address]  01/06/2016    UPPER GASTROINTESTINAL ENDOSCOPY  01/29/2017    possible candida, otherwise normal appearing    VASCULAR SURGERY  aprx 2 years ago    2 stents placed, each side of groin      CURRENTMEDICATIONS       Current Discharge Medication List        CONTINUE these medications which have NOT CHANGED    Details   pravastatin (PRAVACHOL) 40 MG tablet TAKE 1 TABLET BY MOUTH DAILY  Qty: 90 tablet, Refills: 2      clopidogrel (PLAVIX) 75 MG tablet TAKE 1 TABLET BY MOUTH DAILY  Qty: 90 tablet, Refills: 2      docusate sodium (DOK) 100 MG capsule Take 1 capsule by mouth as needed for Constipation      sennosides-docusate sodium (SENOKOT-S) 8.6-50 MG tablet Take 1 tablet by mouth as needed for Constipation    Associated Diagnoses: Constipation, unspecified constipation type      apixaban (ELIQUIS) 2.5 MG TABS tablet Take 1 tablet by mouth 2 times daily  Qty: 60 tablet, Refills: 0      gabapentin (NEURONTIN) 100 MG capsule Take 2 capsules by mouth 3 times daily as needed (neuropathic pain) for up to 10 days. Qty: 60 capsule, Refills: 0    Associated Diagnoses: Dialysis patient, noncompliant ([facility]      metoprolol succinate (TOPROL XL) 100 MG extended release tablet Take 1 tablet by mouth in the morning and at bedtime  Qty: 30 tablet, Refills: 3      cyclobenzaprine (FLEXERIL) 5 MG tablet Muscle spasms    Associated Diagnoses: Chronic pain syndrome      QUEtiapine (SEROQUEL) 50 MG tablet TAKE 1 TABLET BY MOUTH EVERY EVENING  Qty: 30 tablet, Refills: 10    Associated Diagnoses: Insomnia, unspecified type; Mood disorder (HCC)      isosorbide dinitrate (ISORDIL) 20 MG tablet Take 1 tablet by mouth 3 times daily  Qty: 90 tablet, Refills: 3      pantoprazole (PROTONIX) 40 MG tablet TAKE 1 TABLET BY MOUTH EVERY MORNING BEFORE BREAKFAST  Qty: 30 tablet, Refills: 10    Associated Diagnoses: Gastroesophageal reflux disease without esophagitis      DULoxetine (CYMBALTA) 30 MG extended release capsule TAKE 1 CAPSULE BY MOUTH EVERY DAY  Qty: 30 capsule, Refills: 10    Associated Diagnoses: Depression, unspecified depression type      B Complex-C-Folic Acid (VIRT-CAPS) 1 MG CAPS TAKE 1 CAPSULE BY MOUTH EVERY DAY  Qty: 90 capsule, Refills: 1      Calcium Acetate, Phos Binder, 667 MG CAPS TAKE 1 CAPSULE BY MOUTH THREE TIMES DAILY WITH MEALS  Qty: 90 capsule, Refills: 3      nitroGLYCERIN (NITROSTAT) 0.4 MG SL tablet DISSOLVE 1 TABLET UNDER THE TONGUE AS NEEDED FOR CHEST PAIN EVERY 5 MINUTES UP TO 3 TIMES. IF NO RELIEF CALL 911.   Qty: 25 tablet, Refills: 10    Associated Diagnoses: Coronary artery disease involving native heart without angina pectoris, unspecified vessel or lesion type      vitamin D (ERGOCALCIFEROL) 65346 units CAPS capsule TK 1 C PO WEEKLY  Refills: 11 Alcohol Swabs PADS USE AS DIRECTED  Qty: 300 each, Refills: 3      ipratropium-albuterol (DUONEB) 0.5-2.5 (3) MG/3ML SOLN nebulizer solution Inhale 3 mLs into the lungs every 6 hours as needed for Shortness of Breath  Qty: 360 mL, Refills: 1      calcium carbonate (TUMS) 500 MG chewable tablet Take 1 tablet by mouth 3 times daily as needed for Heartburn. SCREENINGS          Robert Coma Scale  Eye Opening: Spontaneous  Best Verbal Response: Oriented  Best Motor Response: Obeys commands  [place] Coma Scale Score: 15                CIWA Assessment  BP: (!) 160/72  Heart Rate: 94                  PHYSICAL EXAM :  ED Triage Vitals   BP Temp Temp src Pulse Resp SpO2 Height Weight   -- -- -- -- -- -- -- --        Physical Exam  Vitals and nursing note reviewed. Constitutional:       Appearance: He is well-developed. He is ill-appearing. He is not diaphoretic. Comments: Chronically ill-appearing male on BiPAP on arrival.  He appears disheveled. HENT:      Head: Normocephalic and atraumatic. Nose: Nose normal.      Mouth/Throat:      Mouth: Mucous membranes are dry. Eyes:      General:         Right eye: No discharge. Left eye: No discharge. Conjunctiva/sclera: Conjunctivae normal.   Cardiovascular:      Rate and Rhythm: Tachycardia present. Pulmonary:      Effort: Pulmonary effort is normal. No respiratory distress. Comments: Some expiratory wheezing. On BiPAP. Decreased breath sounds throughout without any rales or rhonchi appreciated. Abdominal:      Palpations: Abdomen is soft. Tenderness: There is no abdominal tenderness. There is no guarding or rebound. Musculoskeletal:         General: Normal range of motion. Cervical back: Neck supple. Skin:     General: Skin is warm and dry. Capillary Refill: Capillary refill takes less than 2 seconds. Neurological:      Mental Status: He is alert and oriented to person, place, and time.    Psychiatric: Behavior: Behavior normal.     ___    DIAGNOSTIC RESULTS     LABS:   Labs Reviewed   CBC WITH AUTO DIFFERENTIAL - Abnormal; Notable for the following components:       Result Value    WBC 16.3 (*)     RBC 3.82 (*)     Hemoglobin 11.3 (*)     Hematocrit 35.0 (*)     RDW 16.4 (*)     Neutrophils Absolute 15.7 (*)     Lymphocytes Absolute 0.3 (*)     All other components within normal limits   COMPREHENSIVE METABOLIC PANEL W/ REFLEX TO MG FOR LOW K - Abnormal; Notable for the following components:    Sodium 128 (*)     Potassium reflex Magnesium 5.4 (*)     Chloride 86 (*)     CO2 18 (*)     Anion Gap 24 (*)     Glucose 363 (*)     BUN 81 (*)     Creatinine 7.8 (*)     Est, Glom Filt Rate 8 (*)     All other components within normal limits    Narrative:     Enid Patrick tel. [phone],  Chemistry results called to and read back by Ella Millan RN, 03/10/2023  10:02, by [id number], SEPSIS - Abnormal; Notable for the following components:    Lactic Acid, Sepsis 2.0 (*)     All other components within normal limits   TROPONIN - Abnormal; Notable for the following components:    Troponin 0.34 (*)     All other components within normal limits    Narrative:     Enid Patrick tel. [phone],  Chemistry results called to and read back by Ella Millan RN, 03/10/2023  10:02, by [facility] - Abnormal; Notable for the following components:    Pro-BNP >70,000 (*)     All other components within normal limits    Narrative:     Ortrey Sanju  Cena Sandhoff [phone],  Chemistry results called to and read back by Ella Millan RN, 03/10/2023  10:02, by [facility], VENOUS - Abnormal; Notable for the following components:    pH, Blade 7.323 (*)     pCO2, Blade 37.3 (*)     pO2, Blade 74.1 (*)     HCO3, Venous 18.9 (*)     Base Excess, Blade -6.5 (*)     Carboxyhemoglobin 3.9 (*)     All other components within normal limits   COVID-19 & INFLUENZA COMBO   CULTURE, BLOOD 1   CULTURE, BLOOD 2   LACTATE, SEPSIS LACTATE, SEPSIS   LACTATE, SEPSIS   TROPONIN   PROCALCITONIN      When ordered only abnormal lab results are displayed. All other labs were within normal range or not returned as of this dictation. RADIOLOGY:      Non-plain film images such as CT, Ultrasound and MRI are read by the radiologist. Plain radiographic images are visualized and preliminarily interpreted by the ED Provider with the below findings:   Interpretation per the Radiologist below, if available at the time of this note:     XR CHEST (2 VW)         XR CHEST PORTABLE   Preliminary Result   Left pleural effusion. Mild vascular congestion. Consolidative airspace   disease in the left lung base which could represent infiltrate or mass. Follow up to resolution is suggested. No results found. EKG: The Ekg interpreted by me shows  sinus tachycardia, fjck=403    Axis is   Normal  QTc is   470 msec  Intervals and Durations are unremarkable. ST Segments: nonspecific changes  No significant change from prior EKG dated January 29, 2023        PROCEDURES   Unless otherwise noted below, none     CRITICAL CARE TIME   30 min         Vitals:    Vitals:    03/10/23 1228 03/10/23 1229 03/10/23 1313 03/10/23 1341   BP: (!) 163/92  (!) 159/92 (!) 160/72   Pulse: 98  100 94   Resp: (!) 31 21 22 21   Temp:    97.6 °F (36.4 °C)   TempSrc:    Axillary   SpO2: 100%  100% 100%   Weight:       Height:                 Is this patient to be included in the SEP-1 Core Measure due to severe sepsis or septic shock? Yes   SEP-1 CORE MEASURE DATA      Sepsis Criteria   Severe Sepsis Criteria   Septic Shock Criteria     Must be confirmed or suspected to move forward with diagnosis of sepsis. Must meet 2:    [] Temperature > 100.9 F (38.3 C)        or < 96.8 F (36 C)  [x] HR > 90  [x] RR > 20  [] WBC > 12 or < 4 or 10% bands      AND:      [x] Infection Confirmed or        Suspected.      Must meet 1:    [] Lactate > 2       or   [x] Signs of Organ Dysfunction:    - SBP < 90 or MAP < 65  - Altered mental status  - Creatinine > 2 or increased from      baseline  - Urine Output < 0.5 ml/kg/hr  - Bilirubin > 2  - INR > 1.5 (not anticoagulated)  - Platelets < 789,192  - Acute Respiratory Failure as     evidenced by new need for NIPPV     or mechanical ventilation      [] No criteria met for Severe Sepsis. Must meet 1:    [] Lactate > 4        or   [] SBP < 90 or MAP < 65 for at        least two readings in the first        hour after fluid bolus        administration      [] Vasopressors initiated (if hypotension persists after fluid resuscitation)        [] No criteria met for Septic Shock. Patient Vitals for the past 6 hrs:   BP Temp Pulse Resp SpO2 Height Weight Weight Method Percent Weight Change   03/10/23 0854 (!) 166/110 97.2 °F (36.2 °C) (!) 113 24 98 % 5' 9\" (1.753 m) 221 lb (100.2 kg) Stated 0   03/10/23 0900 -- -- (!) 109 17 100 % -- -- -- --   03/10/23 1029 (!) 168/100 -- 99 24 100 % -- -- -- --   03/10/23 1057 (!) 178/75 -- 99 26 97 % -- -- -- --   03/10/23 1127 (!) 116/99 -- (!) 105 14 100 % -- -- -- --   03/10/23 1228 (!) 163/92 -- 98 (!) 31 100 % -- -- -- --   03/10/23 1229 -- -- -- 21 -- -- -- -- --   03/10/23 1313 (!) 159/92 -- 100 22 100 % -- -- -- --   03/10/23 1341 (!) 160/72 97.6 °F (36.4 °C) 94 21 100 % -- -- -- --      Recent Labs     03/10/23  0920 03/10/23  1142   WBC  --  16.3*   CREATININE 7.8*  --    BILITOT 0.3  --    PLT  --  287         Time Severe Sepsis Identified: 1200    Fluid Resuscitation Rational: less than 30mL/kg because of a history of ESRD or stage IV/V CKD. Instead, fluids was ordered. More fluid initially would be potentially detrimental to the patient      Repeat lactate level: ordered and pending at this time    Reassessment Exam:   Not applicable. Patient does not have septic shock.        CC/HPI Summary, DDx, ED Course, and Reassessment:     EMS reports that the patient was hypoxic in the 62s to 76s on their arrival.  He came in on positive pressure ventilation. He was transitioned to our BiPAP and continued to improve throughout his stay. His laboratory work-up was significant for a white count of 16, some anemia which is chronic. Lactic acid of 2. His venous pH revealed a pH of 7.323. COVID and flu were both negative. The BNP was greater than 70,000 with a troponin of 0.34. Sodium 128, potassium 5.4. CO2 18, glucose 363, BUN 81, creatinine 7.8. Patient's chest x-ray revealed a left pleural effusion, mild vascular congestion, consolidative airspace disease in the left lung which could be infiltrate or mass. EKG was without ST elevation MI. The patient continued to improve on BiPAP. Given his tachycardia and tachypnea and suspected infection he met sepsis criteria. He was covered for hospital associated pneumonia. Nephrology was consulted and they saw the patient in the ED. Medicine was consulted and they have excepted this patient to their service.        Patient was given the following medications:   Medications   sodium chloride flush 0.9 % injection 5-40 mL (has no administration in time range)   sodium chloride flush 0.9 % injection 5-40 mL (has no administration in time range)   0.9 % sodium chloride infusion (has no administration in time range)   vancomycin 1500 mg in sodium chloride 0.9% 300 mL IVPB (has no administration in time range)   sodium chloride flush 0.9 % injection 5-40 mL (has no administration in time range)   sodium chloride flush 0.9 % injection 5-40 mL (has no administration in time range)   0.9 % sodium chloride infusion (has no administration in time range)   acetaminophen (TYLENOL) tablet 650 mg (has no administration in time range)     Or   acetaminophen (TYLENOL) suppository 650 mg (has no administration in time range)   apixaban (ELIQUIS) tablet 2.5 mg (has no administration in time range)   clopidogrel (PLAVIX) tablet 75 mg (has no administration in time range)   DULoxetine (CYMBALTA) extended release capsule 30 mg (has no administration in time range)   ipratropium-albuterol (DUONEB) nebulizer solution 3 mL (has no administration in time range)   isosorbide dinitrate (ISORDIL) tablet 20 mg (has no administration in time range)   metoprolol succinate (TOPROL XL) extended release tablet 100 mg (has no administration in time range)   pantoprazole (PROTONIX) tablet 40 mg (has no administration in time range)   pravastatin (PRAVACHOL) tablet 40 mg (has no administration in time range)   QUEtiapine (SEROQUEL) tablet 50 mg (has no administration in time range)   lidocaine 1 % injection 5 mL (has no administration in time range)   sodium chloride flush 0.9 % injection 5-40 mL (has no administration in time range)   sodium chloride flush 0.9 % injection 5-40 mL (has no administration in time range)   0.9 % sodium chloride infusion (has no administration in time range)   b complex-C-folic acid (NEPHROCAPS) capsule 1 mg (has no administration in time range)   cefepime (MAXIPIME) 1,000 mg in sodium chloride 0.9 % 50 mL IVPB (mini-bag) (has no administration in time range)   cefepime (MAXIPIME) 2,000 mg in sodium chloride 0.9 % 100 mL IVPB (mini-bag) (0 mg IntraVENous Stopped 3/10/23 1342)        CONSULTS: (Who and What was discussed)  IP CONSULT TO NEPHROLOGY  IP CONSULT TO CARDIOLOGY  IP CONSULT TO PALLIATIVE CARE    Discussion with Other Profesionals : Admitting Team and nephology - see above . While the patient reports to me he has been compliant with dialysis the nephrologist tells me that the patient will not be compliant with doing the entire run of dialysis if he agrees that he is volume overloaded.     Social Determinants : None    Records Reviewed : None    Chronic Conditions:   has a past medical history of Ambulatory dysfunction, Aortic stenosis, Arthritis, Asthma, 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/2013), CAD (coronary artery disease), Cardiomyopathy ([facility] (04/19/2019), CHF (congestive heart failure) (HCC), Chronic pain, COPD (chronic obstructive pulmonary disease) ([address] Depression, Diabetes mellitus ([address] Difficult intravenous access, Emphysema of lung ([address] ESRD (end stage renal disease) on dialysis ([address] Fear of needles, Gastric ulcer, GERD (gastroesophageal reflux disease), Heart valve problem, Hemodialysis patient ([address] History of spinal fracture, blood clots, Hyperlipidemia, Hypertension, MI (myocardial infarction) ([address] (2019), Neuromuscular disorder ([address] Numbness and tingling in left arm, Pneumonia, PONV (postoperative nausea and vomiting), Prolonged emergence from general anesthesia, Sleep apnea, Stroke ([address] TIA (transient ischemic attack), and Unspecified diseases of blood and blood-forming organs. Disposition Considerations:    I am the Primary Clinician of Record. FINAL IMPRESSION    1. Pneumonia due to infectious organism, unspecified laterality, unspecified part of lung    2. Acute respiratory failure, unspecified whether with hypoxia or hypercapnia ([address]    3. Septicemia ([address]    4. Anemia, unspecified type    5. Elevated troponin           DISPOSITION/PLAN     PATIENT REFERRED TO:   No follow-up provider specified.      DISCHARGE MEDICATIONS:   Current Discharge Medication List           DISCONTINUED MEDICATIONS:   Current Discharge Medication List                 (Please note that portions of this note were completed with a voice recognition program.  Efforts were made to edit the dictations but occasionally words are mis-transcribed.)       Ervin Francisco MD (electronically signed)              Ervin Francisco MD  03/10/23 [phone]

## 2023-03-10 NOTE — PROGRESS NOTES
03/10/23 1522   NIV Type   Skin Assessment Clean, dry, & intact   Skin Protection for O2 Device No  (pt refusing)   NIV Started/Stopped On   Equipment Type v60   Mode Bilevel   Mask Type Full face mask   Mask Size Large   Settings/Measurements   PIP Observed 11 cm H20   IPAP 10 cmH20   CPAP/EPAP 5 cmH2O   Vt (Measured) 745 mL   Rate Ordered 10   Resp 28   FiO2  60 %   I Time/ I Time % 1 s   Minute Volume (L/min) 20.9 Liters   Mask Leak (lpm) 45 lpm   Comfort Level Good   Using Accessory Muscles Yes   SpO2 100   Alarm Settings   Alarms On Y   Low Pressure (cmH2O) 6 cmH2O   High Pressure (cmH2O) 30 cmH2O   Apnea (secs) 20 secs   RR Low (bpm) 6   RR High (bpm) 40 br/min   Breath Sounds   Right Upper Lobe Diminished   Right Middle Lobe Diminished   Right Lower Lobe Diminished   Left Upper Lobe Diminished   Left Lower Lobe Diminished

## 2023-03-10 NOTE — PROGRESS NOTES
Arrived at hospital to place PICC line (line called in at 1700). Pt is currently receiving dialysis and there's no approval of line. In April 2022, nephro team denied a ML/PICC placement. If Nephrology approves - please have consent completed and pt in bed when ready. When all of these things are done please call [facility]. The  will direct you to the PICC RN that is on call.      [phone]

## 2023-03-10 NOTE — CONSULTS
Patient seen and examined, consult note dictated. Assessment and Plan:    1- ESRD: We will arrange for hemodialysis today per [facility] schedule. 2- Hyponatremia: We will apply daily fluid restriction. 3- Hyperkalemia: Mild, we will treat medically pending dialysis. 4- HTN: Blood pressure within acceptable range. 5- Anemia: Hemoglobin above target for ESRD, hold DAVON and monitor. 6- Shortness of breath: Will attempt further Ultrafiltration during dialysis.

## 2023-03-10 NOTE — LETTER
[facility]           3/16/2023                                                                                                                                     [address] [place] [address], [address] [place] [address]        [phone]      To:[facility]   Phone:   Fax:       Jaylyn Durán RN/CM     Phone: [phone]  Fax:          Naveed Reidnch discharge date today 03/16  [facility] and [facility] order              CONFIDENTIALITY NOTICE  This message is intended only for the use of the individual or entity to which it is addressed and may contain information that is privileged, confidential, and exempt from disclosure under applicable law. If the reader of the notice is not the intended recipient or the employee/agent responsible for delivering the message to the intended recipient, you are hereby notified that any dissemination, distribution or copying of this communication is strictly prohibited. Please contact the sender for further instructions on handling the information.

## 2023-03-10 NOTE — PROGRESS NOTES
03/10/23 1229   NIV Type   Skin Protection for O2 Device Yes   Location Nose   Equipment Type v60   Mode Bilevel   Mask Type Full face mask   Mask Size Large   Bonnet size Large   Settings/Measurements   PIP Observed 11 cm H20   IPAP 10 cmH20   CPAP/EPAP 5 cmH2O   Vt (Measured) 578 mL   Rate Ordered 10   Resp 21   FiO2  45 %   I Time/ I Time % 1 s   Minute Volume (L/min) 16.1 Liters   Mask Leak (lpm) 59 lpm   Comfort Level Good   Using Accessory Muscles No   SpO2 100   Patient's Home Machine No   Alarm Settings   Alarms On Y   Low Pressure (cmH2O) 6 cmH2O   High Pressure (cmH2O) 30 cmH2O   Apnea (secs) 20 secs   RR Low (bpm) 6   RR High (bpm) 45 br/min   Breath Sounds   Right Upper Lobe Diminished   Right Middle Lobe Diminished   Right Lower Lobe Diminished   Left Upper Lobe Diminished   Left Lower Lobe Diminished

## 2023-03-10 NOTE — CONSULTS
[address] [facility]  [phone]      Attending Physician: Ethel Ledesma DO  Reason for Consultation/Chief Complaint: shortness of breath    Subjective   History of Present Illness:  Michael Herramnn is a 45 y.o. patient who presented to the hospital with complaints of shortness of breath, patient noted increasing shortness of breath today and had plan to go to dialysis which is one of his usual dialysis days however due to this as well as chest pressure/congestion, he came to emergency room was admitted and taken for urgent dialysis. He is not sure what his dry weight is but in speaking to the nursing staff, appears his dry weight is generally around 102 kg, current weight is actually less than that but he thinks he might have lost actual body weight and his dry weight may not be accurate. In the course of evaluation, pro BNP was found to be greater than 70,000, troponin level 0.34, the proBNP level is similar to prior values from several recent hospital evaluations/admissions for similar issues. Troponin level is somewhat higher, previously it had been between 0.11-0. 16. Patient is currently on noninvasive ventilatory support with BiPAP at 60% FiO2. Further history cannot be obtained from patient as he is short of breath, history is otherwise mainly per chart review. Chronically, he does have dm/ESRD, is on dialysis m/w/f and has been compliant with that. He has a cardiac history which dates back to at least 2013 and has been diagnosed with nonischemic cardiomyopathy, he typically follows with Dr. An Howe in the office. He did have more significant CAD detected in 2015 with LAD PCI at that time. He saw Dr. An Howe in 2019 in follow-up for preop evaluation for possible kidney transplant he was noted to have significant aortic stenosis, and he was referred to the TAVR team and underwent TAVR in 2019.   His last office visit was in February 2022, it was noted he had been hospitalized in January to 20 with shortness of breath and elevated troponin as well as abnormal stress test underwent catheterization and found to have progressive RCA disease treated with drug-eluting stent x1. He is also had a history of peripheral vascular disease, is followed with Dr. Derek Lucas and has had PTA to the right iliac artery. His target dry weight has been felt to be around 119 kg. Current weight is 109 kg. Additionally, patient has had paroxysmal atrial fibrillation, has been on Eliquis, has had a history of stroke. At the hospitalizations in May and June 2022, he underwent cardiac catheterization and was found to have significant CAD however was felt to be he was best treated medically at that time. Patient was also evaluated by our consult service in September 2022, had similar issues at that time, and due to prolonged hospital course with comorbidities, was felt that medical management was best although high risk PCI was considered at that time. Patient did follow-up in our office in November 2022, and had ongoing medical therapy at that time as well as during another hospitalization in December 2022. Of note, palliative care consult has been placed and this is pending.     Past Medical History:   has a past medical history of Ambulatory dysfunction, Aortic stenosis, Arthritis, Asthma, Bilateral hilar adenopathy syndrome, CAD (coronary artery disease), Cardiomyopathy ([address] CHF (congestive heart failure) ([address] Chronic pain, COPD (chronic obstructive pulmonary disease) ([address] Depression, Diabetes mellitus ([address] Difficult intravenous access, Emphysema of lung ([address] ESRD (end stage renal disease) on dialysis ([address] Fear of needles, Gastric ulcer, GERD (gastroesophageal reflux disease), Heart valve problem, Hemodialysis patient ([address] History of spinal fracture, Hx of blood clots, Hyperlipidemia, Hypertension, MI (myocardial infarction) ([address] Neuromuscular disorder ([address] Numbness and tingling in left arm, Pneumonia, PONV (postoperative nausea and vomiting), Prolonged emergence from general anesthesia, Sleep apnea, Stroke ([address] TIA (transient ischemic attack), and Unspecified diseases of blood and blood-forming organs. Surgical History:   has a past surgical history that includes Tonsillectomy; cyst removal (08/14/2013); Colonoscopy; Coronary angioplasty with stent (05/26/15); vascular surgery (aprx 2 years ago); Colonoscopy (2/29/2015); Upper gastrointestinal endoscopy (01/06/2016); Upper gastrointestinal endoscopy (01/29/2017); other surgical history (02/01/2017); Dialysis fistula creation (Left, 10/30/2017); Diagnostic Cardiac Cath Lab Procedure; other surgical history (2018); other surgical history (Bilateral, 06/26/2018); pr laps insertion tunneled intraperitoneal catheter (N/A, 9/21/2018); other surgical history (Right, 09/2018); Dialysis fistula creation (Left, 3/27/2019); other surgical history (05/28/2019); Endoscopy, colon, diagnostic; Catheter Removal (Right, 7/2/2019); Aortic valve replacement (N/A, 10/15/2019); Rectal surgery (N/A, 2/24/2022); IR TUNNELED CVC PLACE WO SQ PORT/PUMP > 5 YEARS (3/21/2022); IR TUNNELED CVC PLACE WO SQ PORT/PUMP > 5 YEARS (4/21/2022); IR TUNNELED CVC PLACE WO SQ PORT/PUMP > 5 YEARS (4/26/2022); IR TUNNELED CVC PLACE WO SQ PORT/PUMP > 5 YEARS (6/23/2022); and thoracentesis (N/A, 10/2/2022). Social History:   reports that he quit smoking about 2 years ago. His smoking use included cigarettes. He has a 16.50 pack-year smoking history. He has never used smokeless tobacco. He reports that he does not currently use alcohol. He reports that he does not use drugs. Family History:  family history includes Alcohol Abuse in his brother; Cancer in his sister and sister; Diabetes in his mother; Heart Disease in his father, sister, and sister; Kidney Disease in his sister; Obesity in his sister and sister.       Home Medications:  Were reviewed and are listed in nursing record and/or below  Prior to Admission medications    Medication Sig Start Date End Date Taking? Authorizing Provider   pravastatin (PRAVACHOL) 40 MG tablet TAKE 1 TABLET BY MOUTH DAILY 2/7/23   [facility] JAY Vargas CNP   clopidogrel (PLAVIX) 75 MG tablet TAKE 1 TABLET BY MOUTH DAILY 2/7/23   [facility], APRN - CNP   docusate sodium (DOK) 100 MG capsule Take 1 capsule by mouth as needed for Constipation 12/13/22   Percival Means, DO   sennosides-docusate sodium (SENOKOT-S) 8.6-50 MG tablet Take 1 tablet by mouth as needed for Constipation 12/13/22   Percival Means, DO   apixaban (ELIQUIS) 2.5 MG TABS tablet Take 1 tablet by mouth 2 times daily 11/1/22   [facility], APRN - CNP   gabapentin (NEURONTIN) 100 MG capsule Take 2 capsules by mouth 3 times daily as needed (neuropathic pain) for up to 10 days.  10/20/22 10/30/22  Marybeth Cerrato MD   metoprolol succinate (TOPROL XL) 100 MG extended release tablet Take 1 tablet by mouth in the morning and at bedtime 7/21/22   Annalee Foster MD   cyclobenzaprine (FLEXERIL) 5 MG tablet Muscle spasms 7/21/22   Annalee Foster MD   QUEtiapine (SEROQUEL) 50 MG tablet TAKE 1 TABLET BY MOUTH EVERY EVENING 6/30/22   Marii Moulton MD   isosorbide dinitrate (ISORDIL) 20 MG tablet Take 1 tablet by mouth 3 times daily 6/8/22   JASE Liu   pantoprazole (PROTONIX) 40 MG tablet TAKE 1 TABLET BY MOUTH EVERY MORNING BEFORE BREAKFAST 4/28/22   Marii Moulton MD   DULoxetine (CYMBALTA) 30 MG extended release capsule TAKE 1 CAPSULE BY MOUTH EVERY DAY 3/29/22   Marii Moulton MD   B Complex-C-Folic Acid (VIRT-CAPS) 1 MG CAPS TAKE 1 CAPSULE BY MOUTH EVERY DAY 9/20/21   Marii Moulton MD   Calcium Acetate, Phos Binder, 667 MG CAPS TAKE 1 CAPSULE BY MOUTH THREE TIMES DAILY WITH MEALS 8/12/21   Marii Moulton MD   nitroGLYCERIN (NITROSTAT) 0.4 MG SL tablet DISSOLVE 1 TABLET UNDER THE TONGUE AS NEEDED FOR CHEST PAIN EVERY 5 MINUTES UP TO 3 TIMES. IF NO RELIEF CALL 911. 1/7/21   Phyllistine MD Morelia   vitamin D (ERGOCALCIFEROL) 90769 units CAPS capsule TK 1 C PO WEEKLY 6/2/19   Historical Provider, MD   Alcohol Swabs PADS USE AS DIRECTED 4/25/18   Greg Gross MD   ipratropium-albuterol (DUONEB) 0.5-2.5 (3) MG/3ML SOLN nebulizer solution Inhale 3 mLs into the lungs every 6 hours as needed for Shortness of Breath 10/15/17   Natalie Alarcon MD   calcium carbonate (TUMS) 500 MG chewable tablet Take 1 tablet by mouth 3 times daily as needed for Heartburn.     Historical Provider, MD        CURRENT Medications:  sodium chloride flush 0.9 % injection 5-40 mL, 2 times per day  sodium chloride flush 0.9 % injection 5-40 mL, PRN  0.9 % sodium chloride infusion, PRN  vancomycin 1500 mg in sodium chloride 0.9% 300 mL IVPB, Once  sodium chloride flush 0.9 % injection 5-40 mL, 2 times per day  sodium chloride flush 0.9 % injection 5-40 mL, PRN  0.9 % sodium chloride infusion, PRN  acetaminophen (TYLENOL) tablet 650 mg, Q6H PRN   Or  acetaminophen (TYLENOL) suppository 650 mg, Q6H PRN  apixaban (ELIQUIS) tablet 2.5 mg, BID  [START ON 3/11/2023] clopidogrel (PLAVIX) tablet 75 mg, Daily  [START ON 3/11/2023] DULoxetine (CYMBALTA) extended release capsule 30 mg, Daily  ipratropium-albuterol (DUONEB) nebulizer solution 3 mL, Q6H PRN  isosorbide dinitrate (ISORDIL) tablet 20 mg, TID  metoprolol succinate (TOPROL XL) extended release tablet 100 mg, BID  [START ON 3/11/2023] pantoprazole (PROTONIX) tablet 40 mg, QAM AC  [START ON 3/11/2023] pravastatin (PRAVACHOL) tablet 40 mg, Daily  QUEtiapine (SEROQUEL) tablet 50 mg, Nightly  lidocaine 1 % injection 5 mL, Once  sodium chloride flush 0.9 % injection 5-40 mL, 2 times per day  sodium chloride flush 0.9 % injection 5-40 mL, PRN  0.9 % sodium chloride infusion, PRN  b complex-C-folic acid (NEPHROCAPS) capsule 1 mg, Daily  [START ON 3/11/2023] cefepime (MAXIPIME) 1,000 mg in sodium chloride 0.9 % 50 mL IVPB (mini-bag), Q24H  perflutren lipid microspheres (DEFINITY) injection 1.5 mL, ONCE PRN        Allergies:  Morphine     Review of Systems:   Not able to obtain from patient, he is short of breath and cannot provide history      Objective   PHYSICAL EXAM:    Vitals:    03/10/23 1545   BP: (!) 177/132   Pulse: (!) 121   Resp: (!) 36   Temp: 98.2 °F (36.8 °C)   SpO2: 100%    Weight: 221 lb 9 oz (100.5 kg)         General Appearance:  Alert, cooperative, in resp distress, appears older than stated age   Head:  Normocephalic, without obvious abnormality, atraumatic   Eyes:  PERRL, conjunctiva/corneas clear   Nose: Nares normal, no drainage or sinus tenderness   Throat: Lips, mucosa, and tongue normal   Neck: Supple, elev jvp    Lungs:   Bilat rhonchi respirations labored   Chest Wall:  No deformity or tenderness   Heart:  Regular rate and rhythm, S1, S2 normal, dsnt ht snds, tachy    Abdomen:   Soft, non-tender, bowel sounds active all four quadrants,  no masses, no organomegaly   Extremities: Extremities 1+ le edema    Skin: Skin color, texture, turgor normal, no rashes or lesions   Pysch: Anxious mood and affect   Neurologic: Gen weakness         Labs   CBC:   Lab Results   Component Value Date/Time    WBC 16.3 03/10/2023 11:42 AM    RBC 3.82 03/10/2023 11:42 AM    HGB 11.3 03/10/2023 11:42 AM    HCT 35.0 03/10/2023 11:42 AM    MCV 91.5 03/10/2023 11:42 AM    RDW 16.4 03/10/2023 11:42 AM     03/10/2023 11:42 AM     CMP:  Lab Results   Component Value Date/Time     03/10/2023 09:20 AM    K 5.4 03/10/2023 09:20 AM    CL 86 03/10/2023 09:20 AM    CO2 18 03/10/2023 09:20 AM    BUN 81 03/10/2023 09:20 AM    CREATININE 7.8 03/10/2023 09:20 AM    GFRAA 10 10/05/2022 11:53 AM    GFRAA >60 05/17/2013 06:50 AM    AGRATIO 1.1 03/10/2023 09:20 AM    LABGLOM 8 03/10/2023 09:20 AM    GLUCOSE 363 03/10/2023 09:20 AM    PROT 7.6 03/10/2023 09:20 AM    PROT 6.7 12/27/2012 04:18 AM    CALCIUM 9.3 03/10/2023 09:20 AM    BILITOT 0.3 03/10/2023 09:20 AM    ALKPHOS 60 03/10/2023 09:20 AM    AST 20 03/10/2023 09:20 AM    ALT 11 03/10/2023 09:20 AM     PT/INR:  No results found for: PTINR  HgBA1c:  Lab Results   Component Value Date    LABA1C 6.9 12/25/2022     Lab Results   Component Value Date    CKTOTAL 45 05/25/2022    TROPONINI 0.34 (H) 03/10/2023         Cardiac Data     Last EKG:     Sinus tachycardia, there is not appear to be overt ST changes to suggest ischemia. Echo:    12/2022    Summary   Limited only f/u for LVEF and pericardial effusion. The left ventricular systolic function is severely reduced with an ejection   fraction of 25- 30%. Changes noted from previous echo on 9- in left ventricular function. There is a trivial to small localized near left and right ventricle   pericardial effusion noted. No tamponade physiology. 5/2022     Summary   Definity® used for myocardial border enhancement. Left ventricular systolic function is severely reduced with a visually   estimated ejection fraction of 20-25 %. EF estimated by Jasmine's method at 24 %. The left ventricle is mildly dilated in size with normal wall thickness. Severe global hypokinesis with regional variation. Grade I diastolic dysfunction with normal LV pressure. There is no evidence of a left ventricular thrombus. Right ventricular systolic function is reduced. A 29 mm Langford Leyda S3 bioprosthetic aortic valve appears well seated   with normal Doppler values. Inadequate tricuspid valve regurgitation to estimate systolic pulmonary   artery pressure. There is a moderate left pleural effusion noted.         Stress Test:     5/2022          Summary    Severe LV dysfunction EF 26%    Global hypokinesis with regional variation, more pronounced in inferolateral    wall and apex    Large mainly fixed defect in inferior wall, extends to portions of lateral    wall and apex with moderate darrick-infarct ischemia        Overall, this would be considered an abnormal, higher risk, study             Cath:     2022     CARDIAC CATHETERIZATION REPORT      Procedure Date:  2022  Patient Name: Yariel Diaz  MRN: [id number]      : 1970        INDICATION      High risk abnormal stress test  Ischemic or mildly  Non-STEMI     PROCEDURES PERFORMED      Right heart catheterization  Left heart catheterization  LVgram  Coronary angiogam  Coronary cath  Monitoring of moderate conscious sedation              PROCEDURE DESCRIPTION   Risks/benefits/alternatives/outcomes were discussed with patient and/or family and informed consent was obtained. Using the [facility] scale, the patient's right radial artery was found to be a level B. Patient was prepped draped in the usual sterile fashion. Local anaesthetic was applied over puncture site. Using ultrasound guidance and a front wall technique, a 4/5 Turkmen Terumo sheath was inserted into right radial artery. Verapamil, nitroglycerin, nicardipine were administered through the sheath. Heparin was administered. Diagnostic 5 [place] pigtail, TIG, Binu catheters were used for diagnostic angiograms. At the conclusion of the procedure, a TR band was placed over the puncture site and hemostasis was obtained. There were no immediate complications. I supervised sedation from 9:53 AM to 11:08 AM with versed 5 mg/fentanyl 125 mcg during the procedure. An independent trained observer pushed FatTail at my direction. We monitored the patient's level of consciousness and vital signs/physiologic status throughout the procedure duration (see times listed previously). 205 cc contrast was utilized. <20cc EBL. Case/findings/plans discussed with patient's wife, she understood/agreed.         FINDINGS      HEMODYNAMICS     CHAMBER PRESSURE SATURATION   RA  10 60%   RV  47/9     PA  50/20  67%   PW  19     AORTA  101/50  95%      NANCY CARDIAC OUTPUT  5.8 L/min   SVR  756    PVR  234             LVGRAM     LVEDP  21   GRADIENT ACROSS AORTIC VALVE  less than 5 mmHg   LV FUNCTION EF 20%   WALL MOTION  severe global hypokinesis with regional variation   MITRAL REGURGITATION  mild         CORONARY ARTERIES     LM Less than 10% [id number]-[id number] stenosis            LAD Moderately calcified, 20 to 30% proximal-mid stenosis, distal vessel tapers at the apex with 60% diffuse disease. D1 has an ostial/proximal 75 to 80% stenosis. LCX  Calcified, proximal-mid 75 to 80% stenosis. Distal vessel is tortuous with 70 to 85% diffuse stenosis with more discrete stenosis noted distally. OM 1 is a very small vessel with ostial/proximal 80% stenosis and 60 to 70% diffuse disease in the fwizjums-mnr-orbpvg vessel. Jestine Courts RCA Dominant, calcified, tortuous, there is a proximal-mid stent with 60 to 70% in-stent restenosis. Distal vessel 20 to 30% stenosis               CONCLUSIONS:      Mild to moderately increased filling pressures  Patent RCA stent with moderate to borderline severe in-stent restenosis  Severe circumflex and D1 stenosis  Continue medical management, consider outpatient high risk PCI of above territories pending outpatient clinical follow-up. Currently medical management seems best given comorbidities and need for additional fluid removal.  If PCI is pursued, will likely need general anesthesia. Patient had difficulty lying still on Cath Lab table. Okay to resume Eliquis tomorrow, case/plan/findings discussed with patient and wife, they understand/agree, they stated it is incorrectly stated in chart the patient would refuse blood, he is okay to receive blood products if needed. Continue beta-blocker and Entresto      Studies:       I have reviewed labs and imaging/xray/diagnostic testing in this note.     Assessment and Plan          Patient Active Problem List   Diagnosis    Sepsis without acute organ dysfunction (HCC)    CHF (congestive heart failure) (HCC)    Asthma-COPD overlap syndrome (HCC)    CAD (coronary artery disease)    PVD (peripheral vascular disease) ([facility])    Bicuspid aortic valve    Bilateral hilar adenopathy syndrome    Claudication in peripheral vascular disease ([facility])    Uncontrolled hypertension    Diabetic neuropathy ([facility])    DM2 (diabetes mellitus, type 2) ([facility])    Passive smoke exposure    Depression with anxiety    PNA (pneumonia)    Obesity (BMI 30-39.9)    ZAINAB on CPAP    Degeneration of lumbar or lumbosacral intervertebral disc    Lumbar radiculopathy    Lumbosacral spondylosis without myelopathy    Biliary colic    Symptomatic cholelithiasis    Gastroparesis due to DM ([facility])    Angina, class IV ([facility])    Dyspnea    Dyslipidemia    Acute on chronic combined systolic and diastolic heart failure ([facility])    Ischemic cardiomyopathy    Tobacco abuse    CVA (cerebral vascular accident) ([facility]    History of CVA (cerebrovascular accident)    Type 2 diabetes mellitus without complication, without long-term current use of insulin ([facility])    ZAINAB (obstructive sleep apnea)    Pleural effusion on left    Chronic anemia    Nonrheumatic aortic valve stenosis    Mucus plugging of bronchi    Hemodialysis-associated hypotension    ESRD (end stage renal disease) ([facility])    Hypotension due to drugs    Acute diastolic CHF (congestive heart failure) ([facility])    Neuromuscular disorder ([facility])    Renovascular hypertension    Mixed hyperlipidemia    Cigarette nicotine dependence in remission    Pulmonary edema    Fluid overload    Anemia of chronic disease    SOB (shortness of breath) on exertion    Steal syndrome of dialysis vascular access ([facility])    Chronic, continuous use of opioids    Chronic bronchitis ([facility])    Nasal congestion    Hypercholesteremia    Bradycardia    History of transcatheter aortic valve replacement (TAVR)    Syncope and collapse    PAF (paroxysmal atrial fibrillation) ([facility])    Bilateral leg weakness    GBS (Guillain-Highlands syndrome) ([facility])    Sinus pause    Weakness of both lower extremities    Sinus bradycardia Ataxia    Peripheral vascular occlusive disease (HCC)    Cellulitis of right foot    Iliac artery occlusion, right (HCC)    Cellulitis and abscess of hand    Uncontrolled type 2 diabetes mellitus with hyperglycemia (HCC)    Acute encephalopathy    Acute hypoxemic respiratory failure (HCC)    Acute CVA (cerebrovascular accident) ([address]    Speech problem    Urinary tract infection with hematuria    Respiratory failure with hypoxia ([address]    Acute respiratory failure with hypercapnia (HCC)    Acute pulmonary edema (HCC)    Grade II diastolic dysfunction    Shock circulatory (HCC)    Smoker    Normocytic normochromic anemia    NSTEMI (non-ST elevated myocardial infarction) (HCC)    SIRS (systemic inflammatory response syndrome) (HCC)    Atrial flutter (HCC)    Liberty-rectal abscess    Somnolence    Pulmonary edema with diastolic CHF, NYHA class 3 ([address]    Respiratory failure ([address]    Former smoker    Cardiomyopathy ([address]    Morbid obesity with BMI of 40.0-44.9, adult (HCC)    Hypoglycemia    Altered mental status    Hypertensive emergency    SOB (shortness of breath)    Acute respiratory failure with hypoxia and hypercapnia (HCC)    HFrEF (heart failure with reduced ejection fraction) (HCC)    Pericardial effusion    Hypervolemia    Acute pneumonia    Acute on chronic respiratory failure with hypoxia (HCC)    Compression atelectasis    Type 2 myocardial infarction (HCC)    Acute respiratory failure with hypoxemia (HCC)    Hyperkalemia    Hyponatremia    Metabolic acidemia    Pulmonary infiltrate    Leukocytosis    Hypertensive urgency    Severe sepsis (HCC)       Elevated cardiac biomarkers including troponin and BNP, likely supply/demand mismatch in the setting of known CAD/ASHD, would manage medically/expectantly based on recent evaluations, patient does not appear to be a good candidate for invasive strategy including follow-up catheterization or PCI at this time.   This can be reconsidered pending convalescence and improvement clinically. As patient has been on anticoagulation with Eliquis, would not heparinize, obtain echocardiogram.    Atrial fibrillation, continue Eliquis    Ischemic heart disease/cardiomyopathy, continue oral nitrates, beta-blocker, and restart Entresto. Hypercholesterolemia, continue statin    Hypertension, uncontrolled, add nitro drip, would be okay to give this concurrently with oral nitrates as this may help with pulmonary edema. Diabetes, as per primary service    ESRD, as per nephrology    Respiratory failure, as per primary service        Thank you for allowing us to participate in the care of Maribel Calderon. Please call me with any questions [phone].     Fabio Montague MD, [address] [facility]   Interventional Cardiologist  [place]   [phone] [facility]  [phone] [address] [place] [address]  3/10/2023 5:10 PM

## 2023-03-10 NOTE — ACP (ADVANCE CARE PLANNING)
Advance Care Planning     General Advance Care Planning (ACP) Conversation    Date of Conversation: 3/10/2023  Conducted with: Patient with Decision Making Capacity    Healthcare Decision Maker:    Primary Decision Maker: Crystal Ann - Spouse - [phone]    Secondary Decision Maker: Bertin Simon - Brother/Sister - [phone]  Click here to complete Healthcare Decision Makers including selection of the Healthcare Decision Maker Relationship (ie \"Primary\"). Today we documented Decision Maker(s) consistent with Legal Next of Kin hierarchy. Content/Action Overview:   Has ACP document(s) on file - reflects the patient's care preferences          Length of Voluntary ACP Conversation in minutes:  <16 minutes (Non-Billable)    NINO Leon

## 2023-03-10 NOTE — PROGRESS NOTES
03/10/23 0900   NIV Type   $NIV $Daily Charge   Skin Protection for O2 Device Yes   Location Nose   NIV Started/Stopped On   Equipment Type v60   Mode Bilevel   Mask Type Full face mask   Mask Size Large   Bonnet size Large   Settings/Measurements   PIP Observed 13 cm H20   IPAP 10 cmH20   CPAP/EPAP 5 cmH2O   Vt (Measured) 691 mL   Rate Ordered 10   FiO2  45 %   I Time/ I Time % 1 s   Minute Volume (L/min) 15.8 Liters   Mask Leak (lpm) 47 lpm   Comfort Level Good   Using Accessory Muscles Yes   SpO2 100   Patient's Home Machine No   Alarm Settings   Alarms On Y   Low Pressure (cmH2O) 6 cmH2O   High Pressure (cmH2O) 30 cmH2O   Apnea (secs) 20 secs   RR Low (bpm) 6   RR High (bpm) 45 br/min   Breath Sounds   Right Upper Lobe Diminished   Right Middle Lobe Diminished   Right Lower Lobe Diminished   Left Upper Lobe Diminished   Left Lower Lobe Diminished

## 2023-03-10 NOTE — CARE COORDINATION
Case Management Assessment  Initial Evaluation    Date/Time of Evaluation: 3/10/2023 1:41 PM  Assessment Completed by: NINO Gutierres    If patient is discharged prior to next notation, then this note serves as note for discharge by case management. Patient Name: Nolan Nguyễn                   YOB: 1970  Diagnosis: Severe sepsis ([facility] [address] [A41.9, R65.20]                   Date / Time: 3/10/2023  8:49 AM    Patient Admission Status: Inpatient   Readmission Risk (Low < 19, Mod (19-27), High > 27): Readmission Risk Score: 48.1    Current PCP: Dayami Pastor MD  PCP verified by CM? Yes    Chart Reviewed: Yes      History Provided by: Patient  Patient Orientation: Alert and Oriented    Patient Cognition: Alert    Hospitalization in the last 30 days (Readmission):  No    If yes, Readmission Assessment in CM Navigator will be completed. Advance Directives:      Code Status: Full Code   Patient's Primary Decision Maker is: Named in Scanned ACP Document    Primary Decision Maker: Crystal Ann - Spouse - [phone]    Secondary Decision Maker: Luis Enrique Smith - Brother/Sister - [phone]    Discharge Planning:    Patient lives with: Spouse/Significant Other Type of Home: Trailer/Mobile Home  Primary Care Giver: Self  Patient Support Systems include: Spouse/Significant Other, Family Members   Current Financial resources: None  Current community resources: ECF/Home Care  Current services prior to admission: (P) Durable Medical Equipment, Oxygen Therapy, Home Care            Current DME: (P) Cpap, Oxygen Therapy (Comment)            Type of Home Care services:  (P) Nursing Services    ADLS  Prior functional level: Independent in ADLs/IADLs  Current functional level:  Independent in ADLs/IADLs    PT AM-PAC:   /24  OT AM-PAC:   /24    Family can provide assistance at DC: No  Would you like Case Management to discuss the discharge plan with any other family members/significant others, and if so, who? No  Plans to Return to Present Housing: Yes  Other Identified Issues/Barriers to RETURNING to current housing: none noted  Potential Assistance needed at discharge: (P) Home Care            Potential DME:    Patient expects to discharge to: (P) Trailer/mobile home  Plan for transportation at discharge:      Financial    Payor: Concetta Duarte / Plan: [employer] / Product Type: *No Product type* /     Does insurance require precert for SNF: Yes    Potential assistance Purchasing Medications: (P) No  Meds-to-Beds request:        [facility] [place], [address] [place] [phone] - F [phone]  [address]  Phone: [phone] Fax: [phone]    [address], [address]  [address] [facility] 25[address] [facility]  Phone: [phone] Fax: 51 [facility] [address], [address] [facility]  [address] [address]  Phone: [phone] Fax: 374 [facility] , [address] [facility],[address] [phone] [address]  Phone: [phone] Fax: [phone]      Notes:    Factors facilitating achievement of predicted outcomes: Motivated, Cooperative, and Pleasant    Barriers to discharge: Long standing deficits and Medical complications    Additional Case Management Notes: Referred to patient for d/c planning. Spoke to patient. Patient is a 45year old male admitted for pneumonia. Patient well known from previous visits. Patient reports he lives at home with wife. Reports he is independent in ADLs. Reports wife needs assistance, states she does not assist him due to her disability. Discussed [employer] for wife, she may be eligible for services.   Patient reports did have SN visit last week, he does not know who it was.  Call place to [facility].  They were unable to get a response from patient/family to establish care.  Call placed to [facility], they are current with patient.  Anticipate return home with [facility] on d/c.  Case management to follow for d/c needs.     The Plan for Transition of Care is related to the following treatment goals of Severe sepsis (HCC) [A41.9, R65.20]    IF APPLICABLE: The Patient and/or patient representative Igor and his family were provided with a choice of provider and agrees with the discharge plan. Freedom of choice list with basic dialogue that supports the patient's individualized plan of care/goals and shares the quality data associated with the providers was provided to:     Patient Representative Name:       The Patient and/or Patient Representative Agree with the Discharge Plan?      NINO Galvin, SALVADOR-S  Case Management Department  Ph: [phone] Fax: [phone]

## 2023-03-11 ENCOUNTER — APPOINTMENT (OUTPATIENT)
Dept: GENERAL RADIOLOGY | Age: 53
DRG: 871 | End: 2023-03-11
Payer: MEDICARE

## 2023-03-11 PROBLEM — R77.8 ELEVATED TROPONIN: Status: ACTIVE | Noted: 2023-03-11

## 2023-03-11 PROBLEM — R79.89 ELEVATED TROPONIN: Status: ACTIVE | Noted: 2023-03-11

## 2023-03-11 LAB
ALBUMIN SERPL-MCNC: 3.9 G/DL (ref 3.4–5)
ANION GAP SERPL CALCULATED.3IONS-SCNC: 21 MMOL/L (ref 3–16)
BASOPHILS ABSOLUTE: 0 K/UL (ref 0–0.2)
BASOPHILS RELATIVE PERCENT: 0.1 %
BUN BLDV-MCNC: 76 MG/DL (ref 7–20)
CALCIUM SERPL-MCNC: 9.2 MG/DL (ref 8.3–10.6)
CHLORIDE BLD-SCNC: 86 MMOL/L (ref 99–110)
CO2: 20 MMOL/L (ref 21–32)
CREAT SERPL-MCNC: 6.9 MG/DL (ref 0.9–1.3)
EKG ATRIAL RATE: 106 BPM
EKG DIAGNOSIS: NORMAL
EKG P AXIS: 79 DEGREES
EKG P-R INTERVAL: 170 MS
EKG Q-T INTERVAL: 354 MS
EKG QRS DURATION: 96 MS
EKG QTC CALCULATION (BAZETT): 470 MS
EKG R AXIS: 45 DEGREES
EKG T AXIS: 78 DEGREES
EKG VENTRICULAR RATE: 106 BPM
EOSINOPHILS ABSOLUTE: 0 K/UL (ref 0–0.6)
EOSINOPHILS RELATIVE PERCENT: 0.1 %
GFR SERPL CREATININE-BSD FRML MDRD: 9 ML/MIN/{1.73_M2}
GLUCOSE BLD-MCNC: 177 MG/DL (ref 70–99)
GLUCOSE BLD-MCNC: 258 MG/DL (ref 70–99)
GLUCOSE BLD-MCNC: 262 MG/DL (ref 70–99)
GLUCOSE BLD-MCNC: 277 MG/DL (ref 70–99)
GLUCOSE BLD-MCNC: 290 MG/DL (ref 70–99)
HCT VFR BLD CALC: 33.8 % (ref 40.5–52.5)
HEMOGLOBIN: 10.9 G/DL (ref 13.5–17.5)
LACTIC ACID, SEPSIS: 1.5 MMOL/L (ref 0.4–1.9)
LYMPHOCYTES ABSOLUTE: 0.6 K/UL (ref 1–5.1)
LYMPHOCYTES RELATIVE PERCENT: 4.8 %
MCH RBC QN AUTO: 29.5 PG (ref 26–34)
MCHC RBC AUTO-ENTMCNC: 32.4 G/DL (ref 31–36)
MCV RBC AUTO: 91.1 FL (ref 80–100)
MONOCYTES ABSOLUTE: 0.9 K/UL (ref 0–1.3)
MONOCYTES RELATIVE PERCENT: 6.8 %
NEUTROPHILS ABSOLUTE: 11.1 K/UL (ref 1.7–7.7)
NEUTROPHILS RELATIVE PERCENT: 88.2 %
PDW BLD-RTO: 16.9 % (ref 12.4–15.4)
PERFORMED ON: ABNORMAL
PHOSPHORUS: 8.3 MG/DL (ref 2.5–4.9)
PLATELET # BLD: 323 K/UL (ref 135–450)
PMV BLD AUTO: 7.8 FL (ref 5–10.5)
POTASSIUM SERPL-SCNC: 5.7 MMOL/L (ref 3.5–5.1)
PROCALCITONIN: 1.06 NG/ML (ref 0–0.15)
RBC # BLD: 3.71 M/UL (ref 4.2–5.9)
REPORT: NORMAL
SODIUM BLD-SCNC: 127 MMOL/L (ref 136–145)
TROPONIN: 0.27 NG/ML
WBC # BLD: 12.6 K/UL (ref 4–11)

## 2023-03-11 PROCEDURE — 2580000003 HC RX 258: Performed by: EMERGENCY MEDICINE

## 2023-03-11 PROCEDURE — 80069 RENAL FUNCTION PANEL: CPT

## 2023-03-11 PROCEDURE — 6370000000 HC RX 637 (ALT 250 FOR IP): Performed by: INTERNAL MEDICINE

## 2023-03-11 PROCEDURE — 6360000002 HC RX W HCPCS

## 2023-03-11 PROCEDURE — 2580000003 HC RX 258: Performed by: INTERNAL MEDICINE

## 2023-03-11 PROCEDURE — 93005 ELECTROCARDIOGRAM TRACING: CPT | Performed by: INTERNAL MEDICINE

## 2023-03-11 PROCEDURE — 6370000000 HC RX 637 (ALT 250 FOR IP): Performed by: NURSE PRACTITIONER

## 2023-03-11 PROCEDURE — 90935 HEMODIALYSIS ONE EVALUATION: CPT

## 2023-03-11 PROCEDURE — 94761 N-INVAS EAR/PLS OXIMETRY MLT: CPT

## 2023-03-11 PROCEDURE — 94660 CPAP INITIATION&MGMT: CPT

## 2023-03-11 PROCEDURE — 99232 SBSQ HOSP IP/OBS MODERATE 35: CPT | Performed by: NURSE PRACTITIONER

## 2023-03-11 PROCEDURE — 85025 COMPLETE CBC W/AUTO DIFF WBC: CPT

## 2023-03-11 PROCEDURE — 2700000000 HC OXYGEN THERAPY PER DAY

## 2023-03-11 PROCEDURE — 84145 PROCALCITONIN (PCT): CPT

## 2023-03-11 PROCEDURE — 83605 ASSAY OF LACTIC ACID: CPT

## 2023-03-11 PROCEDURE — 71046 X-RAY EXAM CHEST 2 VIEWS: CPT

## 2023-03-11 PROCEDURE — 6360000002 HC RX W HCPCS: Performed by: INTERNAL MEDICINE

## 2023-03-11 PROCEDURE — 36415 COLL VENOUS BLD VENIPUNCTURE: CPT

## 2023-03-11 PROCEDURE — 93010 ELECTROCARDIOGRAM REPORT: CPT | Performed by: INTERNAL MEDICINE

## 2023-03-11 PROCEDURE — 84484 ASSAY OF TROPONIN QUANT: CPT

## 2023-03-11 PROCEDURE — 2060000000 HC ICU INTERMEDIATE R&B

## 2023-03-11 RX ORDER — HEPARIN SODIUM 1000 [USP'U]/ML
INJECTION, SOLUTION INTRAVENOUS; SUBCUTANEOUS
Status: DISPENSED
Start: 2023-03-11 | End: 2023-03-11

## 2023-03-11 RX ADMIN — CEFEPIME 1000 MG: 1 INJECTION, POWDER, FOR SOLUTION INTRAMUSCULAR; INTRAVENOUS at 11:35

## 2023-03-11 RX ADMIN — APIXABAN 2.5 MG: 5 TABLET, FILM COATED ORAL at 11:23

## 2023-03-11 RX ADMIN — Medication 10 ML: at 20:31

## 2023-03-11 RX ADMIN — HEPARIN SODIUM 3600 UNITS: 1000 INJECTION INTRAVENOUS; SUBCUTANEOUS at 10:23

## 2023-03-11 RX ADMIN — METOPROLOL SUCCINATE 100 MG: 50 TABLET, EXTENDED RELEASE ORAL at 11:22

## 2023-03-11 RX ADMIN — APIXABAN 2.5 MG: 5 TABLET, FILM COATED ORAL at 20:30

## 2023-03-11 RX ADMIN — PRAVASTATIN SODIUM 40 MG: 40 TABLET ORAL at 11:22

## 2023-03-11 RX ADMIN — METOPROLOL SUCCINATE 100 MG: 50 TABLET, EXTENDED RELEASE ORAL at 20:31

## 2023-03-11 RX ADMIN — SACUBITRIL AND VALSARTAN 1 TABLET: 24; 26 TABLET, FILM COATED ORAL at 11:23

## 2023-03-11 RX ADMIN — INSULIN LISPRO 4 UNITS: 100 INJECTION, SOLUTION INTRAVENOUS; SUBCUTANEOUS at 17:12

## 2023-03-11 RX ADMIN — SACUBITRIL AND VALSARTAN 1 TABLET: 24; 26 TABLET, FILM COATED ORAL at 20:30

## 2023-03-11 RX ADMIN — NEPHROCAP 1 MG: 1 CAP ORAL at 11:22

## 2023-03-11 RX ADMIN — ISOSORBIDE DINITRATE 20 MG: 20 TABLET ORAL at 20:30

## 2023-03-11 RX ADMIN — PANTOPRAZOLE SODIUM 40 MG: 40 TABLET, DELAYED RELEASE ORAL at 11:23

## 2023-03-11 RX ADMIN — CLOPIDOGREL BISULFATE 75 MG: 75 TABLET ORAL at 11:23

## 2023-03-11 RX ADMIN — QUETIAPINE FUMARATE 50 MG: 25 TABLET ORAL at 20:31

## 2023-03-11 RX ADMIN — DULOXETINE HYDROCHLORIDE 30 MG: 30 CAPSULE, DELAYED RELEASE ORAL at 11:22

## 2023-03-11 RX ADMIN — ISOSORBIDE DINITRATE 20 MG: 20 TABLET ORAL at 11:23

## 2023-03-11 ASSESSMENT — PAIN SCALES - GENERAL
PAINLEVEL_OUTOF10: 4
PAINLEVEL_OUTOF10: 5

## 2023-03-11 NOTE — PROGRESS NOTES
Hospitalist Progress Note      PCP: Mary Posey MD    Date of Admission: 3/10/2023    Chief Complaint: SOB    Hospital Course:   45 y.o. male PMH of ESRD on hemodialysis MWF, chronic systolic CHF, s/p TAVR, CAD, HTN, DM2, asthma-COPD overlap presented to the ED with complaints of shortness of breath. He reports for the past few nights he has been waking up unable to breath. Cannot lie flat. Does have cough and clear sputum production. Reports feeling warm at night but no recorded fevers. Due to worsening SOB he called EMS. En Route he had some chest discomfort that is no longer present. In ED he was placed on BiPAP and given duoneb and solumedrol in route. Still short of breath when tried to be placed on 5L NC. Subjective: PAP overnight. Now on 6L. BP improving. Discussed importance of using insulin.  Dyspnea improving       Medications:  Reviewed    Infusion Medications    sodium chloride      sodium chloride      sodium chloride      nitroGLYCERIN      dextrose       Scheduled Medications    sodium chloride flush  5-40 mL IntraVENous 2 times per day    vancomycin  15 mg/kg IntraVENous Once    sodium chloride flush  5-40 mL IntraVENous 2 times per day    apixaban  2.5 mg Oral BID    clopidogrel  75 mg Oral Daily    DULoxetine  30 mg Oral Daily    isosorbide dinitrate  20 mg Oral TID    metoprolol succinate  100 mg Oral BID    pantoprazole  40 mg Oral QAM AC    pravastatin  40 mg Oral Daily    QUEtiapine  50 mg Oral Nightly    lidocaine 1 % injection  5 mL IntraDERmal Once    sodium chloride flush  5-40 mL IntraVENous 2 times per day    b complex-C-folic acid  1 capsule Oral Daily    cefepime  1,000 mg IntraVENous Q24H    sacubitril-valsartan  1 tablet Oral BID    insulin lispro  0-8 Units SubCUTAneous TID WC    insulin lispro  0-4 Units SubCUTAneous Nightly     PRN Meds: sodium chloride flush, sodium chloride, sodium chloride flush, sodium chloride, acetaminophen **OR** acetaminophen, ipratropium-albuterol, sodium chloride flush, sodium chloride, perflutren lipid microspheres, heparin (porcine), glucose, dextrose bolus **OR** dextrose bolus, glucagon (rDNA), dextrose      Intake/Output Summary (Last 24 hours) at 3/11/2023 0929  Last data filed at 3/10/2023 1748  Gross per 24 hour   Intake 500 ml   Output 3500 ml   Net -3000 ml       Physical Exam Performed:    BP (!) 113/58   Pulse 72   Temp 97.7 °F (36.5 °C) (Oral)   Resp 18   Ht 5' 9\" (1.753 m)   Wt 228 lb 2.8 oz (103.5 kg)   SpO2 97%   BMI 33.70 kg/m²     General appearance: No apparent distress, appears stated age and cooperative. HEENT: Pupils equal, round, and reactive to light. Conjunctivae/corneas clear. Neck: Supple, with full range of motion. No jugular venous distention. Trachea midline. Respiratory:  Normal respiratory effort. Rales in left lung base and decreased breath sounds. Cardiovascular: Regular rate and rhythm with normal S1/S2 without murmurs, rubs or gallops. Abdomen: Soft, non-tender, non-distended with normal bowel sounds. Musculoskeletal: No clubbing, cyanosis or edema bilaterally. Full range of motion without deformity. Skin: Multiple abrasions on right foot/toes  Neurologic:  Neurovascularly intact without any focal sensory/motor deficits. Cranial nerves: II-XII intact, grossly non-focal.  Psychiatric: Alert and oriented, thought content appropriate, normal insight  Capillary Refill: Brisk, 3 seconds, normal   Peripheral Pulses: +2 palpable, equal bilaterally       Labs:   Recent Labs     03/10/23  1142 03/11/23  0459   WBC 16.3* 12.6*   HGB 11.3* 10.9*   HCT 35.0* 33.8*    323     Recent Labs     03/10/23  0920 03/11/23  0459   * 127*   K 5.4* 5.7*   CL 86* 86*   CO2 18* 20*   BUN 81* 76*   CREATININE 7.8* 6.9*   CALCIUM 9.3 9.2   PHOS  --  8.3*     Recent Labs     03/10/23  0920   AST 20   ALT 11   BILITOT 0.3   ALKPHOS 60     No results for input(s): INR in the last 72 hours.   Recent Labs     03/10/23  0920 03/11/23  0459   TROPONINI 0.34* 0.27*       Urinalysis:      Lab Results   Component Value Date/Time    NITRU Negative 12/25/2022 05:00 AM    WBCUA 6-9 12/25/2022 05:00 AM    BACTERIA Rare 12/25/2022 05:00 AM    RBCUA 3-4 12/25/2022 05:00 AM    BLOODU SMALL 12/25/2022 05:00 AM    SPECGRAV 1.020 12/25/2022 05:00 AM    GLUCOSEU 500 12/25/2022 05:00 AM       Radiology:  XR CHEST (2 VW)         XR CHEST PORTABLE   Preliminary Result   Left pleural effusion. Mild vascular congestion. Consolidative airspace   disease in the left lung base which could represent infiltrate or mass. Follow up to resolution is suggested. IP CONSULT TO NEPHROLOGY  IP CONSULT TO CARDIOLOGY  IP CONSULT TO PALLIATIVE CARE    Assessment/Plan:    Active Hospital Problems    Diagnosis     Severe sepsis ([facility] [address] [A41.9, R65.20]      Priority: Medium     Severe sepsis secondary to Pneumonia, likely Gram +,   With lactic acidosis   Continue cefepime  CXR with left pleural effusion which could be from volume overload. But with WBC elevated and tachycardia and CXR will cover for pneumonia. Procal pending   Repeat lactic acid      Acute on chronic respiratory failure with hypoxemia  BiPAP initiated   Evidence by increased respiratory effort  Likely from volume overload > pneumonia  Nephro consulted   Repeat CXR tomorrow      Troponin elevation higher than baseline, downtrending. Non ischemic findings on EKG. Likely from demand ischemia from sepsis and fluid overload. Cardiology consulted - likely supply/demand mismatch. Would manage medically and does not appear to be a good candidate for invasive strategy including Cath or PCI. Echo pending      ESRD -  on HD M/W/F. Nephrology consulted. Right foot wound - trauma from hitting wall. Wound care     HTN/CAD - w/ known CAD but no evidence of active signs/sxs of ischemia/failure. Currently controlled on home meds w/ vitals reviewed and documented as above. Acute on chronic systolic failure w/ reduced EF 25-30% by Echo dated Dec 2022. Likely due to ischemic and hypertensive heart disease. Cardiology consulted. BNP > 34972. Fluid restriction, daily weights and I/Os. HF order set complete      Afib - chronic paroxysmal of unspecified and clinically unable to determine etiology. Normally rate controlled on BBlocker - continued. Anticoagulated at baseline on home Eliquis due to secondary hypercoaguable state due to Afib - continued. Monitored on tele. DM, complicated by nephropathy   SSI, hypoglycemic protocol. A1c 6.9 12/25/22     Anemia - likely 2nd to CKD, w/out evidence of active bleeding/hemolysis. Above target for ESRD, hold DAVON. Follow serial labs. Obesity -  With Body mass index is 32.64 kg/m². Complicating assessment and treatment. Placing patient at risk for multiple co-morbidities as well as early death and contributing to the patient's presentation. Counseled on weight loss. ZAINAB - likely 2nd to obesity. Controlled on home CPAP/BiPap - continued, w/ outpatient f/u as previously arranged. Management plan - frequent hospital visits. Consult nephrology, CHF RN, Wander Evans, Palliative care    DVT Prophylaxis: Eliquis  Diet: ADULT DIET; Regular; Low Sodium (2 gm);  Low Potassium (Less than 3000 mg/day); 1500 ml  Code Status: Full Code  PT/OT Eval Status: Not indicated    Dispo - 1-2 days pending improvement of respiratory status    Appropriate for A1 Discharge Unit: No      Roya Boot, DO

## 2023-03-11 NOTE — CONSULTS
[address]                                  CONSULTATION    PATIENT NAME: Ja Quiroga                  :        1970  MED REC NO:   [id number]                          ROOM:       [id number]  ACCOUNT NO:   [de-identified]                           ADMIT DATE: 03/10/2023  PROVIDER:     Ron English MD    CONSULT DATE:  03/10/2023    REASON FOR CONSULTATION:  End-stage renal disease management. HISTORY OF PRESENT ILLNESS:  The patient is a 68-year-old  male  patient with a past medical history significant for end-stage renal  disease, on hemodialysis every Monday, Wednesday and Friday. The  patient presented to [facility] complaining of worsening  shortness of breath. Upon presentation, he was noted to be hypoxic,  requiring high flow oxygen with severely elevated BNP level and  pulmonary congestion on his chest x-ray. The patient stated that he did  not miss his hemodialysis on Wednesday though he was not able to achieve  his target weight. The patient was admitted for further evaluation and  Nephrology was consulted for further management of his dialysis needs. PAST MEDICAL HISTORY:  1. End-stage renal disease. 2.  Aortic stenosis. 3.  Coronary artery disease. 4.  Diastolic heart failure. 5.  COPD. 6.  Diabetes mellitus. 7.  Hypertension. 8.  Hyperlipidemia. 9.  Obstructive sleep apnea. PAST SURGICAL HISTORY:  1. Aortic valve replacement. 2.  Tunneled dialysis catheter placement and exchange. 3.  Coronary artery stent placement. 4.  AV fistula creation. 5.  Thoracentesis. 6.  Tonsillectomy. 7.  EGD. ALLERGIES:  The patient is allergic to MORPHINE. SOCIAL HISTORY:  The patient does not smoke or drink alcohol. FAMILY HISTORY:  Negative for kidney disease. REVIEW OF SYSTEMS:  The patient denied any nausea, vomiting or abdominal  pain.   Otherwise, a 10-point review of systems was relatively  unremarkable. PHYSICAL EXAMINATION:  VITAL SIGNS:  Blood pressure 160/72, heart rate 94, respirations 21,  temperature 97.6 Fahrenheit. The patient is satting 100% on 45% FiO2. GENERAL APPEARANCE:  The patient is alert and oriented x3, not in acute  distress. HEENT:  Eyes revealed normal conjunctivae and reactive pupils. NECK:  Revealed midline trachea and nonpalpable thyroid. LUNGS:  Revealed bilateral rhonchi to anterior and posterior  auscultation. Nonlabored breathing. CARDIOVASCULAR EXAM:  Revealed S1 and S2, tachycardic, regular rate and  rhythm. No added murmurs or rubs. 1+ lower extremities pitting edema. ABDOMINAL EXAM:  Revealed obese abdomen, distended. No organomegaly. SKIN:  Revealed no lesions or rashes. Warm to touch. PSYCHIATRIC:  Revealed good judgment and insight. LYMPHATICS:  Revealed no cervical or axillary adenopathies. ASSESSMENT AND PLAN:  1. End-stage renal disease. We will arrange for hemodialysis today per  Monday, Wednesday, Friday schedule. 2.  Hyponatremia. We will apply daily fluid restriction. 3.  Hyperkalemia, mild, we will treat medically pending dialysis. 4.  Hypertension, blood pressure within acceptable range. 5.  Anemia, hemoglobin above target for ESRD, hold erythropoietin  stimulating agents and monitor. 6.  Shortness of breath secondary to volume overload. We will attempt  further ultrafiltration during dialysis.         Ren Ross MD    D: 03/10/2023 14:31:01       T: 03/10/2023 14:35:01     [email or web address]  Job#: [id number]     Doc#: [id number]    CC:

## 2023-03-11 NOTE — PLAN OF CARE
Problem: Discharge Planning  Goal: Discharge to home or other facility with appropriate resources  Outcome: Progressing  Flowsheets (Taken 3/10/2023 1405 by Elisha Deleon RN)  Discharge to home or other facility with appropriate resources: Identify barriers to discharge with patient and caregiver     Problem: Chronic Conditions and Co-morbidities  Goal: Patient's chronic conditions and co-morbidity symptoms are monitored and maintained or improved  Outcome: Progressing  Flowsheets (Taken 3/10/2023 1405 by Elisha Deleon RN)  Care Plan - Patient's Chronic Conditions and Co-Morbidity Symptoms are Monitored and Maintained or Improved: Monitor and assess patient's chronic conditions and comorbid symptoms for stability, deterioration, or improvement     Problem: Pain  Goal: Verbalizes/displays adequate comfort level or baseline comfort level  Outcome: Progressing

## 2023-03-11 NOTE — PROGRESS NOTES
Department of Internal Medicine  Nephrology Progress Note        SUBJECTIVE:    We are following this patient for ESRD management. The patient was seen and examined; he was resting comfortably with no acute events noted overnight. ROS: No fever or chills. Social: No family at bedside. Physical Exam:    VITALS:  BP (!) 112/51   Pulse 80   Temp 97.3 °F (36.3 °C)   Resp 18   Ht 5' 9\" (1.753 m)   Wt 229 lb 15 oz (104.3 kg)   SpO2 97%   BMI 33.96 kg/m²     General appearance: Seems comfortable, no acute distress. Neck: Trachea midline, thyroid normal.   Lungs:  Non labored breathing, CTA to anterior auscultation. Heart:  S1S2 normal, rub or gallop. No peripheral edema. Abdomen: Soft, non-tender, no organomegaly. Skin: No lesions or rashes, warm to touch.      DATA:    CBC with Differential:    Lab Results   Component Value Date/Time    WBC 12.6 03/11/2023 04:59 AM    RBC 3.71 03/11/2023 04:59 AM    HGB 10.9 03/11/2023 04:59 AM    HCT 33.8 03/11/2023 04:59 AM     03/11/2023 04:59 AM    MCV 91.1 03/11/2023 04:59 AM    MCH 29.5 03/11/2023 04:59 AM    MCHC 32.4 03/11/2023 04:59 AM    RDW 16.9 03/11/2023 04:59 AM    SEGSPCT 73.4 05/17/2013 06:50 AM    BANDSPCT 1 12/14/2022 05:36 AM    METASPCT 2 03/03/2022 07:41 AM    LYMPHOPCT 4.8 03/11/2023 04:59 AM    MONOPCT 6.8 03/11/2023 04:59 AM    MYELOPCT 1 03/03/2022 07:41 AM    BASOPCT 0.1 03/11/2023 04:59 AM    MONOSABS 0.9 03/11/2023 04:59 AM    LYMPHSABS 0.6 03/11/2023 04:59 AM    EOSABS 0.0 03/11/2023 04:59 AM    BASOSABS 0.0 03/11/2023 04:59 AM    DIFFTYPE Auto 05/17/2013 06:50 AM     BMP:    Lab Results   Component Value Date/Time     03/11/2023 04:59 AM    K 5.7 03/11/2023 04:59 AM    K 5.4 03/10/2023 09:20 AM    CL 86 03/11/2023 04:59 AM    CO2 20 03/11/2023 04:59 AM    BUN 76 03/11/2023 04:59 AM    LABALBU 3.9 03/11/2023 04:59 AM    CREATININE 6.9 03/11/2023 04:59 AM    CALCIUM 9.2 03/11/2023 04:59 AM    GFRAA 10 10/05/2022 11:53 AM GFRAA >60 05/17/2013 06:50 AM    LABGLOM 9 03/11/2023 04:59 AM    GLUCOSE 262 03/11/2023 04:59 AM       IMPRESSION/RECOMMENDATIONS:      1- ESRD: We will arrange for hemodialysis again today (MWF schedule). 2- Hyponatremia: We will apply fluid restriction and attempt further UF. 3- Hyperkalemia: Mild, will arrange for dialysis again today. 4- HTN: Blood pressure within acceptable range. 5- Anemia: Hemoglobin at target for ESRD, monitor. 6- Shortness of breath: Will attempt further Ultrafiltration during dialysis.

## 2023-03-11 NOTE — RT PROTOCOL NOTE
RT Inhaler-Nebulizer Bronchodilator Protocol Note    There is a bronchodilator order in the chart from a provider indicating to follow the RT Bronchodilator Protocol and there is an Initiate RT Inhaler-Nebulizer Bronchodilator Protocol order as well (see protocol at bottom of note). CXR Findings:  XR CHEST PORTABLE    Result Date: 3/10/2023  Left pleural effusion. Mild vascular congestion. Consolidative airspace disease in the left lung base which could represent infiltrate or mass. Follow up to resolution is suggested. The findings from the last RT Protocol Assessment were as follows:   History Pulmonary Disease: Chronic pulmonary disease  Respiratory Pattern: Regular pattern and RR 12-20 bpm  Breath Sounds: Clear breath sounds  Cough: Strong, spontaneous, non-productive  Indication for Bronchodilator Therapy: On home bronchodilators  Bronchodilator Assessment Score: 2    Aerosolized bronchodilator medication orders have been revised according to the RT Inhaler-Nebulizer Bronchodilator Protocol below. Respiratory Therapist to perform RT Therapy Protocol Assessment initially then follow the protocol. Repeat RT Therapy Protocol Assessment PRN for score 0-3 or on second treatment, BID, and PRN for scores above 3. No Indications - adjust the frequency to every 6 hours PRN wheezing or bronchospasm, if no treatments needed after 48 hours then discontinue using Per Protocol order mode. If indication present, adjust the RT bronchodilator orders based on the Bronchodilator Assessment Score as indicated below. Use Inhaler orders unless patient has one or more of the following: on home nebulizer, not able to hold breath for 10 seconds, is not alert and oriented, cannot activate and use MDI correctly, or respiratory rate 25 breaths per minute or more, then use the equivalent nebulizer order(s) with same Frequency and PRN reasons based on the score.   If a patient is on this medication at home then do not decrease Frequency below that used at home. 0-3 - enter or revise RT bronchodilator order(s) to equivalent RT Bronchodilator order with Frequency of every 4 hours PRN for wheezing or increased work of breathing using Per Protocol order mode. 4-6 - enter or revise RT Bronchodilator order(s) to two equivalent RT bronchodilator orders with one order with BID Frequency and one order with Frequency of every 4 hours PRN wheezing or increased work of breathing using Per Protocol order mode. 7-10 - enter or revise RT Bronchodilator order(s) to two equivalent RT bronchodilator orders with one order with TID Frequency and one order with Frequency of every 4 hours PRN wheezing or increased work of breathing using Per Protocol order mode. 11-13 - enter or revise RT Bronchodilator order(s) to one equivalent RT bronchodilator order with QID Frequency and an Albuterol order with Frequency of every 4 hours PRN wheezing or increased work of breathing using Per Protocol order mode. Greater than 13 - enter or revise RT Bronchodilator order(s) to one equivalent RT bronchodilator order with every 4 hours Frequency and an Albuterol order with Frequency of every 2 hours PRN wheezing or increased work of breathing using Per Protocol order mode. RT to enter RT Home Evaluation for COPD & MDI Assessment order using Per Protocol order mode.     Electronically signed by Liborio Wooten RCP on 3/10/2023 at 8:30 PM

## 2023-03-11 NOTE — PROGRESS NOTES
03/10/23 2131   NIV Type   Skin Assessment Clean, dry, & intact   Skin Protection for O2 Device No   NIV Started/Stopped On   Equipment Type v60   Mode Bilevel   Mask Type Full face mask   Mask Size Large   Settings/Measurements   PIP Observed 12 cm H20   IPAP 10 cmH20   CPAP/EPAP 5 cmH2O   Vt (Measured) 619 mL   Rate Ordered 10   Resp 22   FiO2  (S)  50 %   I Time/ I Time % 1 s   Minute Volume (L/min) 15 Liters   Mask Leak (lpm) 50 lpm   Comfort Level Good   Using Accessory Muscles No   SpO2 97   Alarm Settings   Alarms On Y   Low Pressure (cmH2O) 6 cmH2O   High Pressure (cmH2O) 30 cmH2O   Apnea (secs) 20 secs   RR Low (bpm) 6   RR High (bpm) 40 br/min   Breath Sounds   Right Upper Lobe Diminished   Right Middle Lobe Diminished   Right Lower Lobe Diminished   Left Upper Lobe Diminished   Left Lower Lobe Diminished

## 2023-03-11 NOTE — FLOWSHEET NOTE
03/11/23 0835 03/11/23 1107   Vital Signs   BP 94/73 (!) 112/51   Temp 97.5 °F (36.4 °C) 97.3 °F (36.3 °C)   Heart Rate 62 80   Weight 234 lb 12.6 oz (106.5 kg) 229 lb 15 oz (104.3 kg)   Weight Method Bed scale Bed scale     Treatment time: 95min    Net UF: 2000ml    Pre weight: 106.5kg  Post weight: 104.3kg  EDW: TBD    Access used: [facility] TDC  Access function: Good    Medications or blood products given: None    Regular outpatient schedule: MWF    Summary of response to treatment: Pt started feeling bad when 25min left in tx. BP 76/52 this time. After rinse back blood BP better and sl feeling better. Copy of dialysis treatment record placed in chart, to be scanned into EMR.

## 2023-03-11 NOTE — PROGRESS NOTES
03/11/23 0411   NIV Type   Skin Protection for O2 Device No  (pt refuses)   Equipment Type v60   Mode Bilevel   Mask Type Full face mask   Mask Size Large   Settings/Measurements   PIP Observed 11 cm H20   IPAP 10 cmH20   CPAP/EPAP 5 cmH2O   Vt (Measured) 461 mL   Rate Ordered 10   Resp 23   FiO2  50 %   I Time/ I Time % 1 s   Minute Volume (L/min) 10.5 Liters   Mask Leak (lpm) 48 lpm   Comfort Level Good   Using Accessory Muscles No   SpO2 98   Patient's Home Machine No   Alarm Settings   Alarms On Y   Low Pressure (cmH2O) 6 cmH2O   High Pressure (cmH2O) 30 cmH2O   Apnea (secs) 20 secs   RR Low (bpm) 6   RR High (bpm) 40 br/min

## 2023-03-11 NOTE — PROGRESS NOTES
03/11/23 0057   NIV Type   $NIV $Daily Charge   Skin Assessment Clean, dry, & intact   Skin Protection for O2 Device No  (pt refused mepilex)   Equipment Type v60   Mode Bilevel   Mask Type Full face mask   Mask Size Large   Settings/Measurements   PIP Observed 11 cm H20   IPAP 10 cmH20   CPAP/EPAP 5 cmH2O   Vt (Measured) 621 mL   Rate Ordered 10   Resp 26   FiO2  50 %   I Time/ I Time % 1 s   Minute Volume (L/min) 15.5 Liters   Mask Leak (lpm) 36 lpm   Comfort Level Good   Using Accessory Muscles No   SpO2 98   Patient's Home Machine No   Alarm Settings   Alarms On Y   Low Pressure (cmH2O) 6 cmH2O   High Pressure (cmH2O) 30 cmH2O   Apnea (secs) 20 secs   RR Low (bpm) 6   RR High (bpm) 40 br/min

## 2023-03-11 NOTE — PROGRESS NOTES
[facility]   Daily Progress Note      Admit Date:  3/10/2023    Reason for follow up visit: SOB    CC: \"I don't feel well, but I feel a little better. \"    44 y/o male with PMH significant for CAD/prior stent to LAD and RCA, aortic stenosis and S/P TAVR in 2019, PVD/prior PTA to R iliac artery,  ESRD on HD, diabetes mellitus, COPD, HTN, HLP and sleep apnea who was admitted with increasing SOB and chest pressure. He presented to ED and underwent urgent dialysis. Troponin level and ProBNP elevated and similar to prior values with slightly elevated troponin level from his prior. He was placed on BiPap, but now on o2. He underwent dialysis yesterday and today. Interval History:  Pt. seen and examined; records reviewed  BP reviewed and stable  Denies chest pain; SOB with some improvement  Remains on O2 @ 6L  Net diuresis -3L  Dialysis last 2 days  Nephrology following    Subjective:  Pt with no acute overnight cardiac events. Denies chest pain, cough, palpitations or dizziness  + SOB improving  Pt poor historian    Objective:   /61   Pulse 78   Temp 97.3 °F (36.3 °C)   Resp 18   Ht 5' 9\" (1.753 m)   Wt 229 lb 15 oz (104.3 kg)   SpO2 93%   BMI 33.96 kg/m²     Intake/Output Summary (Last 24 hours) at 3/11/2023 1450  Last data filed at 3/10/2023 1748  Gross per 24 hour   Intake 400 ml   Output 3500 ml   Net -3100 ml     Wt Readings from Last 3 Encounters:   03/11/23 229 lb 15 oz (104.3 kg)   02/06/23 221 lb 9 oz (100.5 kg)   01/28/23 227 lb (103 kg)       Physical Exam:  General: In no acute distress. Awake, alert, and oriented X4. Sitting up in bed. Appears older than stated age  [de-identified]: Normocephalic; sclerae anicteric  Skin:  Warm and dry. Neck:  Supple. No JVD or carotid bruit appreciated. Chest: Lungs with decreased breath sounds bilaterally. No wheezes/rhonchi/rales  Cardiovascular:  RRR. Normal S1 and S2; distant heart tones. Abdomen:  soft, nontender, nondistended, +bowel sounds. Extremities:  1+ bilateral ankle edema. No clubbing or cyanosis. 1+ bilateral DP/PT pulses. Medications:    sodium chloride flush  5-40 mL IntraVENous 2 times per day    vancomycin  15 mg/kg IntraVENous Once    sodium chloride flush  5-40 mL IntraVENous 2 times per day    apixaban  2.5 mg Oral BID    clopidogrel  75 mg Oral Daily    DULoxetine  30 mg Oral Daily    isosorbide dinitrate  20 mg Oral TID    metoprolol succinate  100 mg Oral BID    pantoprazole  40 mg Oral QAM AC    pravastatin  40 mg Oral Daily    QUEtiapine  50 mg Oral Nightly    lidocaine 1 % injection  5 mL IntraDERmal Once    sodium chloride flush  5-40 mL IntraVENous 2 times per day    b complex-C-folic acid  1 capsule Oral Daily    cefepime  1,000 mg IntraVENous Q24H    sacubitril-valsartan  1 tablet Oral BID    insulin lispro  0-8 Units SubCUTAneous TID WC    insulin lispro  0-4 Units SubCUTAneous Nightly      sodium chloride      sodium chloride      sodium chloride      nitroGLYCERIN      dextrose       sodium chloride flush, sodium chloride, sodium chloride flush, sodium chloride, acetaminophen **OR** acetaminophen, ipratropium-albuterol, sodium chloride flush, sodium chloride, perflutren lipid microspheres, heparin (porcine), glucose, dextrose bolus **OR** dextrose bolus, glucagon (rDNA), dextrose    Lab Data:  CBC:   Recent Labs     03/10/23  1142 03/11/23  0459   WBC 16.3* 12.6*   HGB 11.3* 10.9*    323     BMP:    Recent Labs     03/10/23  0920 03/11/23  0459   * 127*   K 5.4* 5.7*   CO2 18* 20*   BUN 81* 76*   CREATININE 7.8* 6.9*     LIVR:   Recent Labs     03/10/23  0920   AST 20   ALT 11      Latest Reference Range & Units Most Recent 3/10/23 09:20   Troponin <0.01 ng/mL 0.27 (H)  3/11/23 04:59 0.34 (H)   (H): Data is abnormally high    ECG:  Sinus tachycardia with nonspecific ST-TW changes    Echo:    Echo: pending     12/2022  TTE  Summary   Limited only f/u for LVEF and pericardial effusion.    The left ventricular systolic function is severely reduced with an ejection   fraction of 25- 30%. Changes noted from previous echo on 2022 in left ventricular function. There is a trivial to small localized near left and right ventricle   pericardial effusion noted. No tamponade physiology. 2022 TTE   Summary   Definity® used for myocardial border enhancement. Left ventricular systolic function is severely reduced with a visually   estimated ejection fraction of 20-25 %. EF estimated by Jasmine's method at 24 %. The left ventricle is mildly dilated in size with normal wall thickness. Severe global hypokinesis with regional variation. Grade I diastolic dysfunction with normal LV pressure. There is no evidence of a left ventricular thrombus. Right ventricular systolic function is reduced. A 29 mm Langford Leyda S3 bioprosthetic aortic valve appears well seated   with normal Doppler values. Inadequate tricuspid valve regurgitation to estimate systolic pulmonary   artery pressure. There is a moderate left pleural effusion noted.         Stress Test:  2022          Summary    Severe LV dysfunction EF 26%    Global hypokinesis with regional variation, more pronounced in inferolateral    wall and apex    Large mainly fixed defect in inferior wall, extends to portions of lateral    wall and apex with moderate darrick-infarct ischemia        Overall, this would be considered an abnormal, higher risk, study                CARDIAC CATHETERIZATION REPORT      Procedure Date:  2022  Patient Name: Rene Parra  MRN: [id number]      : 1970             FINDINGS      HEMODYNAMICS     CHAMBER PRESSURE SATURATION   RA  10 60%   RV  47/9     PA  50/20  67%   PW  19     AORTA  101/50  95%      NANCY CARDIAC OUTPUT  5.8 L/min   SVR  756    PVR  234      LVGRAM     LVEDP  21   GRADIENT ACROSS AORTIC VALVE  less than 5 mmHg   LV FUNCTION EF 20%   WALL MOTION  severe global hypokinesis with regional variation   MITRAL REGURGITATION  mild         CORONARY ARTERIES     LM Less than 10% [id number] stenosis            LAD Moderately calcified, 20 to 30% proximal-mid stenosis, distal vessel tapers at the apex with 60% diffuse disease. D1 has an ostial/proximal 75 to 80% stenosis. LCX  Calcified, proximal-mid 75 to 80% stenosis. Distal vessel is tortuous with 70 to 85% diffuse stenosis with more discrete stenosis noted distally. OM 1 is a very small vessel with ostial/proximal 80% stenosis and 60 to 70% diffuse disease in the [id number] vessel. Tanda Bun RCA Dominant, calcified, tortuous, there is a proximal-mid stent with 60 to 70% in-stent restenosis. Distal vessel 20 to 30% stenosis         CONCLUSIONS:      Mild to moderately increased filling pressures  Patent RCA stent with moderate to borderline severe in-stent restenosis  Severe circumflex and D1 stenosis  Continue medical management, consider outpatient high risk PCI of above territories pending outpatient clinical follow-up. Currently medical management seems best given comorbidities and need for additional fluid removal.  If PCI is pursued, will likely need general anesthesia. Patient had difficulty lying still on Cath Lab table. Okay to resume Eliquis tomorrow, case/plan/findings discussed with patient and wife, they understand/agree, they stated it is incorrectly stated in chart the patient would refuse blood, he is okay to receive blood products if needed.   Continue beta-blocker and Entresto       Telemetry: Sinus rhythm with PVC's  (Personally reviewed)    Assessment/Plan:    1) Elevated troponin  -2/2/ supply/demand mismatch in setting of known CAD and hypoxia  Continue medical management for now: plavix, Toprol, statin  -on nitrate (Isordil)  -follow up echo  -known CAD with prior PCI's  -no c/o angina    2) H/O Atrial fibrillation  -on Eliquis    3) Ischemic cardiomyopathy  -last LVEF 25-30%  -continue Toprol, Entresto  -continue dialysis for volume control    4) Primary hypertension  -continue medical management  -needs tighter control    5) ESRD on HD    6) Acute on chronic respiratory failure  -continue O2 as needed  -slowly improving  -continue to dialyze    7) Diabetes Mellitus  -Rx per Primary team    Electronically signed by JAY Flores CNP on 3/11/2023 at 2:50 PM

## 2023-03-12 LAB
ALBUMIN SERPL-MCNC: 3.9 G/DL (ref 3.4–5)
ANION GAP SERPL CALCULATED.3IONS-SCNC: 21 MMOL/L (ref 3–16)
BASOPHILS ABSOLUTE: 0.1 K/UL (ref 0–0.2)
BASOPHILS RELATIVE PERCENT: 0.8 %
BUN BLDV-MCNC: 74 MG/DL (ref 7–20)
CALCIUM SERPL-MCNC: 8.8 MG/DL (ref 8.3–10.6)
CHLORIDE BLD-SCNC: 88 MMOL/L (ref 99–110)
CO2: 17 MMOL/L (ref 21–32)
CREAT SERPL-MCNC: 6.9 MG/DL (ref 0.9–1.3)
EKG ATRIAL RATE: 68 BPM
EKG DIAGNOSIS: NORMAL
EKG P AXIS: 24 DEGREES
EKG P-R INTERVAL: 168 MS
EKG Q-T INTERVAL: 444 MS
EKG QRS DURATION: 98 MS
EKG QTC CALCULATION (BAZETT): 472 MS
EKG R AXIS: 17 DEGREES
EKG T AXIS: 169 DEGREES
EKG VENTRICULAR RATE: 68 BPM
EOSINOPHILS ABSOLUTE: 0.3 K/UL (ref 0–0.6)
EOSINOPHILS RELATIVE PERCENT: 2.5 %
GFR SERPL CREATININE-BSD FRML MDRD: 9 ML/MIN/{1.73_M2}
GLUCOSE BLD-MCNC: 193 MG/DL (ref 70–99)
GLUCOSE BLD-MCNC: 201 MG/DL (ref 70–99)
GLUCOSE BLD-MCNC: 231 MG/DL (ref 70–99)
GLUCOSE BLD-MCNC: 242 MG/DL (ref 70–99)
GLUCOSE BLD-MCNC: 294 MG/DL (ref 70–99)
HCT VFR BLD CALC: 36.3 % (ref 40.5–52.5)
HEMOGLOBIN: 11.6 G/DL (ref 13.5–17.5)
LYMPHOCYTES ABSOLUTE: 1.3 K/UL (ref 1–5.1)
LYMPHOCYTES RELATIVE PERCENT: 11.7 %
MCH RBC QN AUTO: 29.4 PG (ref 26–34)
MCHC RBC AUTO-ENTMCNC: 31.9 G/DL (ref 31–36)
MCV RBC AUTO: 92.2 FL (ref 80–100)
MONOCYTES ABSOLUTE: 0.9 K/UL (ref 0–1.3)
MONOCYTES RELATIVE PERCENT: 7.9 %
NEUTROPHILS ABSOLUTE: 8.8 K/UL (ref 1.7–7.7)
NEUTROPHILS RELATIVE PERCENT: 77.1 %
PDW BLD-RTO: 16.9 % (ref 12.4–15.4)
PERFORMED ON: ABNORMAL
PHOSPHORUS: 9.7 MG/DL (ref 2.5–4.9)
PLATELET # BLD: 290 K/UL (ref 135–450)
PMV BLD AUTO: 7.6 FL (ref 5–10.5)
POTASSIUM SERPL-SCNC: 5.1 MMOL/L (ref 3.5–5.1)
RBC # BLD: 3.94 M/UL (ref 4.2–5.9)
SODIUM BLD-SCNC: 126 MMOL/L (ref 136–145)
WBC # BLD: 11.4 K/UL (ref 4–11)

## 2023-03-12 PROCEDURE — 94660 CPAP INITIATION&MGMT: CPT

## 2023-03-12 PROCEDURE — 2580000003 HC RX 258: Performed by: INTERNAL MEDICINE

## 2023-03-12 PROCEDURE — 2580000003 HC RX 258: Performed by: EMERGENCY MEDICINE

## 2023-03-12 PROCEDURE — 94761 N-INVAS EAR/PLS OXIMETRY MLT: CPT

## 2023-03-12 PROCEDURE — 6360000002 HC RX W HCPCS: Performed by: INTERNAL MEDICINE

## 2023-03-12 PROCEDURE — 2700000000 HC OXYGEN THERAPY PER DAY

## 2023-03-12 PROCEDURE — 6370000000 HC RX 637 (ALT 250 FOR IP): Performed by: INTERNAL MEDICINE

## 2023-03-12 PROCEDURE — 80069 RENAL FUNCTION PANEL: CPT

## 2023-03-12 PROCEDURE — 85025 COMPLETE CBC W/AUTO DIFF WBC: CPT

## 2023-03-12 PROCEDURE — 2060000000 HC ICU INTERMEDIATE R&B

## 2023-03-12 PROCEDURE — 36415 COLL VENOUS BLD VENIPUNCTURE: CPT

## 2023-03-12 PROCEDURE — 93010 ELECTROCARDIOGRAM REPORT: CPT | Performed by: INTERNAL MEDICINE

## 2023-03-12 PROCEDURE — 6370000000 HC RX 637 (ALT 250 FOR IP): Performed by: NURSE PRACTITIONER

## 2023-03-12 PROCEDURE — 99231 SBSQ HOSP IP/OBS SF/LOW 25: CPT | Performed by: NURSE PRACTITIONER

## 2023-03-12 RX ORDER — LOPERAMIDE HYDROCHLORIDE 2 MG/1
2 CAPSULE ORAL 4 TIMES DAILY PRN
Status: DISCONTINUED | OUTPATIENT
Start: 2023-03-12 | End: 2023-03-16 | Stop reason: HOSPADM

## 2023-03-12 RX ORDER — AZITHROMYCIN 250 MG/1
500 TABLET, FILM COATED ORAL DAILY
Status: COMPLETED | OUTPATIENT
Start: 2023-03-12 | End: 2023-03-13

## 2023-03-12 RX ORDER — SEVELAMER CARBONATE 800 MG/1
1600 TABLET, FILM COATED ORAL
Status: DISCONTINUED | OUTPATIENT
Start: 2023-03-12 | End: 2023-03-16 | Stop reason: HOSPADM

## 2023-03-12 RX ORDER — LORAZEPAM 1 MG/1
1 TABLET ORAL ONCE
Status: COMPLETED | OUTPATIENT
Start: 2023-03-12 | End: 2023-03-12

## 2023-03-12 RX ADMIN — METOPROLOL SUCCINATE 100 MG: 50 TABLET, EXTENDED RELEASE ORAL at 21:03

## 2023-03-12 RX ADMIN — SACUBITRIL AND VALSARTAN 1 TABLET: 24; 26 TABLET, FILM COATED ORAL at 08:47

## 2023-03-12 RX ADMIN — CEFEPIME 1000 MG: 1 INJECTION, POWDER, FOR SOLUTION INTRAMUSCULAR; INTRAVENOUS at 06:25

## 2023-03-12 RX ADMIN — INSULIN LISPRO 2 UNITS: 100 INJECTION, SOLUTION INTRAVENOUS; SUBCUTANEOUS at 16:36

## 2023-03-12 RX ADMIN — LOPERAMIDE HYDROCHLORIDE 2 MG: 2 CAPSULE ORAL at 21:47

## 2023-03-12 RX ADMIN — APIXABAN 2.5 MG: 5 TABLET, FILM COATED ORAL at 21:03

## 2023-03-12 RX ADMIN — ISOSORBIDE DINITRATE 20 MG: 20 TABLET ORAL at 14:10

## 2023-03-12 RX ADMIN — NEPHROCAP 1 MG: 1 CAP ORAL at 08:47

## 2023-03-12 RX ADMIN — CEFTRIAXONE SODIUM 1000 MG: 1 INJECTION, POWDER, FOR SOLUTION INTRAMUSCULAR; INTRAVENOUS at 09:05

## 2023-03-12 RX ADMIN — ISOSORBIDE DINITRATE 20 MG: 20 TABLET ORAL at 08:47

## 2023-03-12 RX ADMIN — DULOXETINE HYDROCHLORIDE 30 MG: 30 CAPSULE, DELAYED RELEASE ORAL at 08:49

## 2023-03-12 RX ADMIN — AZITHROMYCIN MONOHYDRATE 500 MG: 250 TABLET ORAL at 08:47

## 2023-03-12 RX ADMIN — INSULIN LISPRO 2 UNITS: 100 INJECTION, SOLUTION INTRAVENOUS; SUBCUTANEOUS at 08:03

## 2023-03-12 RX ADMIN — LORAZEPAM 1 MG: 1 TABLET ORAL at 21:03

## 2023-03-12 RX ADMIN — APIXABAN 2.5 MG: 5 TABLET, FILM COATED ORAL at 08:48

## 2023-03-12 RX ADMIN — SEVELAMER CARBONATE 1600 MG: 800 TABLET, FILM COATED ORAL at 16:36

## 2023-03-12 RX ADMIN — METOPROLOL SUCCINATE 100 MG: 50 TABLET, EXTENDED RELEASE ORAL at 08:47

## 2023-03-12 RX ADMIN — QUETIAPINE FUMARATE 50 MG: 25 TABLET ORAL at 21:03

## 2023-03-12 RX ADMIN — SEVELAMER CARBONATE 1600 MG: 800 TABLET, FILM COATED ORAL at 12:23

## 2023-03-12 RX ADMIN — PRAVASTATIN SODIUM 40 MG: 40 TABLET ORAL at 08:47

## 2023-03-12 RX ADMIN — LOPERAMIDE HYDROCHLORIDE 2 MG: 2 CAPSULE ORAL at 15:17

## 2023-03-12 RX ADMIN — ISOSORBIDE DINITRATE 20 MG: 20 TABLET ORAL at 21:03

## 2023-03-12 RX ADMIN — Medication 10 ML: at 21:05

## 2023-03-12 RX ADMIN — INSULIN LISPRO 2 UNITS: 100 INJECTION, SOLUTION INTRAVENOUS; SUBCUTANEOUS at 12:22

## 2023-03-12 RX ADMIN — SACUBITRIL AND VALSARTAN 1 TABLET: 24; 26 TABLET, FILM COATED ORAL at 21:02

## 2023-03-12 RX ADMIN — CLOPIDOGREL BISULFATE 75 MG: 75 TABLET ORAL at 08:47

## 2023-03-12 RX ADMIN — PANTOPRAZOLE SODIUM 40 MG: 40 TABLET, DELAYED RELEASE ORAL at 08:52

## 2023-03-12 NOTE — PROGRESS NOTES
03/12/23 0000   NIV Type   $NIV $Daily Charge   Skin Assessment Clean, dry, & intact   Skin Protection for O2 Device No  (Patient refused)   Equipment Type V60   Mode Bilevel   Mask Type Full face mask   Mask Size Medium   Settings/Measurements   PIP Observed 11 cm H20   IPAP 10 cmH20   CPAP/EPAP 5 cmH2O   Vt (Measured) 522 mL   Resp 14   FiO2  40 %   Minute Volume (L/min) 10 Liters   Mask Leak (lpm) 42 lpm   Comfort Level Good   Using Accessory Muscles No   SpO2 98   Patient's Home Machine No   Alarm Settings   Alarms On Y

## 2023-03-12 NOTE — PROGRESS NOTES
Hospitalist Progress Note      PCP: John Navarro MD    Date of Admission: 3/10/2023    Chief Complaint: SOB    Hospital Course:   45 y.o. male PMH of ESRD on hemodialysis MWF, chronic systolic CHF, s/p TAVR, CAD, HTN, DM2, asthma-COPD overlap presented to the ED with complaints of shortness of breath. He reports for the past few nights he has been waking up unable to breath. Cannot lie flat. Does have cough and clear sputum production. Reports feeling warm at night but no recorded fevers. Due to worsening SOB he called EMS. En Route he had some chest discomfort that is no longer present. In ED he was placed on BiPAP and given duoneb and solumedrol in route. Still short of breath when tried to be placed on 5L NC. Subjective: Breathing is improving.  Using PAP when sleeping      Medications:  Reviewed    Infusion Medications    sodium chloride      sodium chloride      sodium chloride      nitroGLYCERIN      dextrose       Scheduled Medications    sodium chloride flush  5-40 mL IntraVENous 2 times per day    vancomycin  15 mg/kg IntraVENous Once    sodium chloride flush  5-40 mL IntraVENous 2 times per day    apixaban  2.5 mg Oral BID    clopidogrel  75 mg Oral Daily    DULoxetine  30 mg Oral Daily    isosorbide dinitrate  20 mg Oral TID    metoprolol succinate  100 mg Oral BID    pantoprazole  40 mg Oral QAM AC    pravastatin  40 mg Oral Daily    QUEtiapine  50 mg Oral Nightly    lidocaine 1 % injection  5 mL IntraDERmal Once    sodium chloride flush  5-40 mL IntraVENous 2 times per day    b complex-C-folic acid  1 capsule Oral Daily    cefepime  1,000 mg IntraVENous Q24H    sacubitril-valsartan  1 tablet Oral BID    insulin lispro  0-8 Units SubCUTAneous TID WC    insulin lispro  0-4 Units SubCUTAneous Nightly     PRN Meds: sodium chloride flush, sodium chloride, sodium chloride flush, sodium chloride, acetaminophen **OR** acetaminophen, ipratropium-albuterol, sodium chloride flush, sodium chloride, perflutren lipid microspheres, heparin (porcine), glucose, dextrose bolus **OR** dextrose bolus, glucagon (rDNA), dextrose      Intake/Output Summary (Last 24 hours) at 3/12/2023 0753  Last data filed at 3/12/2023 0624  Gross per 24 hour   Intake 360 ml   Output 0 ml   Net 360 ml         Physical Exam Performed:    /64   Pulse 60   Temp 97 °F (36.1 °C) (Oral)   Resp 18   Ht 5' 9\" (1.753 m)   Wt 221 lb 14.4 oz (100.7 kg)   SpO2 100%   BMI 32.77 kg/m²     General appearance: No apparent distress, appears stated age and cooperative.  HEENT: Pupils equal, round, and reactive to light. Conjunctivae/corneas clear.  Neck: Supple, with full range of motion. No jugular venous distention. Trachea midline.  Respiratory:  Normal respiratory effort.  Rales in left lung base and decreased breath sounds.   Cardiovascular: Regular rate and rhythm with normal S1/S2 without murmurs, rubs or gallops.  Abdomen: Soft, non-tender, non-distended with normal bowel sounds.  Musculoskeletal: No clubbing, cyanosis or edema bilaterally.  Full range of motion without deformity.  Skin: Multiple abrasions on right foot/toes  Neurologic:  Neurovascularly intact without any focal sensory/motor deficits. Cranial nerves: II-XII intact, grossly non-focal.  Psychiatric: Alert and oriented, thought content appropriate, normal insight  Capillary Refill: Brisk, 3 seconds, normal   Peripheral Pulses: +2 palpable, equal bilaterally       Labs:   Recent Labs     03/10/23  1142 03/11/23  0459 03/12/23  0450   WBC 16.3* 12.6* 11.4*   HGB 11.3* 10.9* 11.6*   HCT 35.0* 33.8* 36.3*    323 290       Recent Labs     03/10/23  0920 03/11/23  0459 03/12/23  0450   * 127* 126*   K 5.4* 5.7* 5.1   CL 86* 86* 88*   CO2 18* 20* 17*   BUN 81* 76* 74*   CREATININE 7.8* 6.9* 6.9*   CALCIUM 9.3 9.2 8.8   PHOS  --  8.3* 9.7*       Recent Labs     03/10/23  0920   AST 20   ALT 11   BILITOT 0.3   ALKPHOS 60       No results for input(s): INR in the  last 72 hours. Recent Labs     03/10/23  0920 03/11/23  0459   TROPONINI 0.34* 0.27*         Urinalysis:      Lab Results   Component Value Date/Time    NITRU Negative 12/25/2022 05:00 AM    WBCUA 6-9 12/25/2022 05:00 AM    BACTERIA Rare 12/25/2022 05:00 AM    RBCUA 3-4 12/25/2022 05:00 AM    BLOODU SMALL 12/25/2022 05:00 AM    SPECGRAV 1.020 12/25/2022 05:00 AM    GLUCOSEU 500 12/25/2022 05:00 AM       Radiology:  XR CHEST (2 VW)   Final Result   Findings most compatible pulmonary edema, which appears increased when   compared to the previous exam.  With that said, pneumonia remains in the   differential.         XR CHEST PORTABLE   Preliminary Result   Left pleural effusion. Mild vascular congestion. Consolidative airspace   disease in the left lung base which could represent infiltrate or mass. Follow up to resolution is suggested. IP CONSULT TO NEPHROLOGY  IP CONSULT TO CARDIOLOGY  IP CONSULT TO PALLIATIVE CARE  IP CONSULT TO HEART FAILURE NURSE/COORDINATOR  IP CONSULT TO DIETITIAN    Assessment/Plan:    Active Hospital Problems    Diagnosis     Acute on chronic systolic HF (heart failure) ([facility]) [I50.23]      Priority: High    CAD in native artery [I25.10]      Priority: High    Elevated troponin [R77.8]      Priority: Medium    Severe sepsis ([facility]) [A41.9, R65.20]      Priority: Medium        Severe sepsis secondary to Pneumonia, likely Gram +,   With lactic acidosis   CXR with left pleural effusion which could be from volume overload. But with WBC elevated and tachycardia and CXR will cover for pneumonia. Repeat lactic acid normalized   DC cefepime, start ceftriaxone and azithro  Blood cultures 1/2 with staph epi      Acute on chronic respiratory failure with hypoxemia  BiPAP initiated   Evidence by increased respiratory effort  Likely from volume overload > pneumonia  Nephro consulted      Troponin elevation higher than baseline, downtrending. Non ischemic findings on EKG.  Likely from demand ischemia from sepsis and fluid overload. Cardiology consulted - likely supply/demand mismatch. Would manage medically and does not appear to be a good candidate for invasive strategy including Cath or PCI. Echo pending      ESRD -  on HD M/W/F. Nephrology consulted. HD per nephro     Right foot wound - trauma from hitting wall. Wound care     HTN/CAD - w/ known CAD but no evidence of active signs/sxs of ischemia/failure. Currently controlled on home meds w/ vitals reviewed and documented as above. Acute on chronic systolic failure w/ reduced EF 25-30% by Echo dated Dec 2022. Likely due to ischemic and hypertensive heart disease. Cardiology consulted. BNP > 79679. Fluid restriction, daily weights and I/Os. HF order set complete. Continue BB and entresto      Afib - chronic paroxysmal of unspecified and clinically unable to determine etiology. Normally rate controlled on BBlocker - continued. Anticoagulated at baseline on home Eliquis due to secondary hypercoaguable state due to Afib - continued. Monitored on tele. DM, complicated by nephropathy   SSI, hypoglycemic protocol. A1c 6.9 12/25/22     Anemia - likely 2nd to CKD, w/out evidence of active bleeding/hemolysis. Above target for ESRD, hold DAVON. Follow serial labs. Obesity -  With Body mass index is 32.64 kg/m². Complicating assessment and treatment. Placing patient at risk for multiple co-morbidities as well as early death and contributing to the patient's presentation. Counseled on weight loss. ZAINAB - likely 2nd to obesity. Controlled on home CPAP/BiPap - continued, w/ outpatient f/u as previously arranged. Management plan - frequent hospital visits. Consult nephrology, CHF RN, Jaqueline Mondragon, Palliative care    DVT Prophylaxis: Eliquis  Diet: ADULT DIET; Regular; 3 carb choices (45 gm/meal); Low Sodium (2 gm);  Low Potassium (Less than 3000 mg/day); 1000 ml  Code Status: Full Code  PT/OT Eval Status: Not indicated    Dispo - Likely tomorrow if continued improved O2 needs and echo complete,     Appropriate for A1 Discharge Unit: Yes      Shayy Valiente, DO

## 2023-03-12 NOTE — PROGRESS NOTES
03/12/23 0421   NIV Type   Skin Assessment Clean, dry, & intact   Equipment Type V60   Mode Bilevel   Mask Type Full face mask   Mask Size Large   Settings/Measurements   PIP Observed 11 cm H20   IPAP 10 cmH20   CPAP/EPAP 5 cmH2O   Vt (Measured) 470 mL   Resp 18   FiO2  40 %   Minute Volume (L/min) 10 Liters   Mask Leak (lpm) 68 lpm   Comfort Level Good   Using Accessory Muscles No   Patient's Home Machine No   Alarm Settings   Alarms On Y

## 2023-03-12 NOTE — PROGRESS NOTES
Department of Internal Medicine  Nephrology Progress Note        SUBJECTIVE:    We are following this patient for ESRD management. The patient was seen and examined; he was resting comfortably with no acute events noted overnight. ROS: No fever or chills. Social: No family at bedside. Physical Exam:    VITALS:  /64   Pulse 60   Temp 97 °F (36.1 °C) (Oral)   Resp 18   Ht 5' 9\" (1.753 m)   Wt 221 lb 14.4 oz (100.7 kg)   SpO2 100%   BMI 32.77 kg/m²     General appearance: Seems comfortable, no acute distress. Neck: Trachea midline, thyroid normal.   Lungs:  Non labored breathing, CTA to anterior auscultation. Heart:  S1S2 normal, rub or gallop. No peripheral edema. Abdomen: Soft, non-tender, no organomegaly. Skin: No lesions or rashes, warm to touch.      DATA:    CBC with Differential:    Lab Results   Component Value Date/Time    WBC 11.4 03/12/2023 04:50 AM    RBC 3.94 03/12/2023 04:50 AM    HGB 11.6 03/12/2023 04:50 AM    HCT 36.3 03/12/2023 04:50 AM     03/12/2023 04:50 AM    MCV 92.2 03/12/2023 04:50 AM    MCH 29.4 03/12/2023 04:50 AM    MCHC 31.9 03/12/2023 04:50 AM    RDW 16.9 03/12/2023 04:50 AM    SEGSPCT 73.4 05/17/2013 06:50 AM    BANDSPCT 1 12/14/2022 05:36 AM    METASPCT 2 03/03/2022 07:41 AM    LYMPHOPCT 11.7 03/12/2023 04:50 AM    MONOPCT 7.9 03/12/2023 04:50 AM    MYELOPCT 1 03/03/2022 07:41 AM    BASOPCT 0.8 03/12/2023 04:50 AM    MONOSABS 0.9 03/12/2023 04:50 AM    LYMPHSABS 1.3 03/12/2023 04:50 AM    EOSABS 0.3 03/12/2023 04:50 AM    BASOSABS 0.1 03/12/2023 04:50 AM    DIFFTYPE Auto 05/17/2013 06:50 AM     BMP:    Lab Results   Component Value Date/Time     03/12/2023 04:50 AM    K 5.1 03/12/2023 04:50 AM    K 5.4 03/10/2023 09:20 AM    CL 88 03/12/2023 04:50 AM    CO2 17 03/12/2023 04:50 AM    BUN 74 03/12/2023 04:50 AM    LABALBU 3.9 03/12/2023 04:50 AM    CREATININE 6.9 03/12/2023 04:50 AM    CALCIUM 8.8 03/12/2023 04:50 AM    GFRAA 10 10/05/2022 11:53 AM GFRAA >60 05/17/2013 06:50 AM    LABGLOM 9 03/12/2023 04:50 AM    GLUCOSE 294 03/12/2023 04:50 AM       IMPRESSION/RECOMMENDATIONS:      1- ESRD: We will continue hemodialysis per [facility] schedule. 2- Hyponatremia: Apply fluid restriction and attempt further UF. 3- Hyperkalemia: Mild, will apply low potassium diet. 4- HTN: Blood pressure within acceptable range. 5- Anemia: Hemoglobin above target for ESRD, hold DAVON and monitor. 6- Shortness of breath: Improving with further Ultrafiltration during dialysis.

## 2023-03-12 NOTE — PLAN OF CARE
Problem: Cardiovascular - Adult  Goal: Maintains optimal cardiac output and hemodynamic stability  Outcome: Progressing  Note: Patient's EF (Ejection Fraction) is less than 40%    Heart Failure Medications:  Diuretics[de-identified] None    (One of the following REQUIRED for EF </= 40%/SYSTOLIC FAILURE but MAY be used in EF% >40%/DIASTOLIC FAILURE)        ACE[de-identified] None        ARB[de-identified] None         ARNI[de-identified] Sacubitril/Valsartan-Entresto    (Beta Blockers)  NON- Evidenced Based Beta Blocker (for EF% >40%/DIASTOLIC FAILURE): None    Evidenced Based Beta Blocker::(REQUIRED for EF% <40%/SYSTOLIC FAILURE) Metoprolol SUCCinate- Toprol XL  . .................................................................................................................................................. Patient's weights and intake/output reviewed: Yes    Patient's Last Weight: 104.3 kg obtained by bed scale at HD. Difference of 2.2 kg less than last documented weight.       Intake/Output Summary (Last 24 hours) at 3/12/2023 0147  Last data filed at 3/11/2023 1942  Gross per 24 hour   Intake 240 ml   Output 0 ml   Net 240 ml     Comorbidities Reviewed Yes    Patient has a past medical history of Ambulatory dysfunction, Aortic stenosis, Arthritis, Asthma, Bilateral hilar adenopathy syndrome, CAD (coronary artery disease), Cardiomyopathy ([address] CHF (congestive heart failure) ([address] Chronic pain, COPD (chronic obstructive pulmonary disease) ([address] Depression, Diabetes mellitus ([address] Difficult intravenous access, Emphysema of lung ([address] ESRD (end stage renal disease) on dialysis ([address] Fear of needles, Gastric ulcer, GERD (gastroesophageal reflux disease), Heart valve problem, Hemodialysis patient ([facility] [address] History of spinal fracture, Hx of blood clots, Hyperlipidemia, Hypertension, MI (myocardial infarction) ([facility] [address] Neuromuscular disorder ([facility] [address] Numbness and tingling in left arm, Pneumonia, PONV (postoperative nausea and vomiting), Prolonged emergence from general anesthesia, Sleep apnea, Stroke ([facility] [address] TIA (transient ischemic attack), and Unspecified diseases of blood and blood-forming organs. >>For CHF and Comorbidity documentation on Education Time and Topics, please see Education Tab    Progressive Mobility Assessment:  What is this patient's Current Level of Mobility?: Ambulatory- with Assistance  How was this patient Mobilized today?: Edge of Bed, Up to Chair,  Up to Toilet/Shower, and Up in Room, ambulated 6 ft                 With Whom? Nurse and PCA                 Level of Difficulty/Assistance: 1x Assist     Pt sitting in bed at this time on  4 L O2. Pt with complaints of shortness of breath. Patient and/or Family's stated Goal of Care this Admission: be more comfortable prior to discharge     Problem: Metabolic/Fluid and Electrolytes - Adult  Goal: Glucose maintained within prescribed range  Outcome: Not Progressing  Note: BG not well controlled. Pt has rec'd dietary education for DM but doesn't follow guidelines. Checking BG ACHS. Problem: Respiratory - Adult  Goal: Achieves optimal ventilation and oxygenation  Outcome: Progressing  Note: Pt able to take BiPAP off for a few hours tonight and breathe comfortably on his nasal cannula.

## 2023-03-12 NOTE — PROGRESS NOTES
[facility] 79   Daily Progress Note      Admit Date:  3/10/2023    Reason for follow up visit: SOB    CC: \"I feel a little better today. \"    46 y/o male with PMH significant for CAD/prior stent to LAD and RCA, aortic stenosis and S/P TAVR in 2019, PVD/prior PTA to R iliac artery,  ESRD on HD, diabetes mellitus, COPD, HTN, HLP and sleep apnea who was admitted with increasing SOB and chest pressure. He presented to ED and underwent urgent dialysis. Troponin level and ProBNP elevated and similar to prior values with slightly elevated troponin level from his prior. He was placed on BiPap, but now on O2. He has had dialysis. Interval History:  Pt. seen and examined; records reviewed  Dialysis scheduled for tomorrow per pt  BP reviewed  Denies chest pain. SOB much improved    Subjective:  Pt with no acute overnight cardiac events. Denies chest pain, cough, palpitations or dizziness  + SOB improving    Objective:   /64   Pulse 60   Temp 97 °F (36.1 °C) (Oral)   Resp 18   Ht 5' 9\" (1.753 m)   Wt 221 lb 14.4 oz (100.7 kg)   SpO2 100%   BMI 32.77 kg/m²     Intake/Output Summary (Last 24 hours) at 3/12/2023 1204  Last data filed at 3/12/2023 1122  Gross per 24 hour   Intake 480 ml   Output 0 ml   Net 480 ml     Wt Readings from Last 3 Encounters:   03/12/23 221 lb 14.4 oz (100.7 kg)   02/06/23 221 lb 9 oz (100.5 kg)   01/28/23 227 lb (103 kg)       Physical Exam:  General: In no acute distress. Awake, alert, and oriented X4. Resting in bed. BiPap inplace as he is dozing  Skin:  Warm and dry. Neck:  Supple and thick. JVD hard to ascertain d/t body habitus  Chest:  Lungs with decreased breath sounds in posterior bases. . No wheezes/rhonchi/rales  Cardiovascular:  RRR. Distant heart tones  Abdomen: obese,soft, nontender, +bowel sounds.    Extremities:  Trace-1+ bilateral ankle and lower pretibial edema; 1+ bilateral DP pulses    Medications:    cefTRIAXone (ROCEPHIN) IV  1,000 mg IntraVENous Q24H azithromycin  500 mg Oral Daily    sevelamer  1,600 mg Oral TID WC    sodium chloride flush  5-40 mL IntraVENous 2 times per day    vancomycin  15 mg/kg IntraVENous Once    sodium chloride flush  5-40 mL IntraVENous 2 times per day    apixaban  2.5 mg Oral BID    clopidogrel  75 mg Oral Daily    DULoxetine  30 mg Oral Daily    isosorbide dinitrate  20 mg Oral TID    metoprolol succinate  100 mg Oral BID    pantoprazole  40 mg Oral QAM AC    pravastatin  40 mg Oral Daily    QUEtiapine  50 mg Oral Nightly    lidocaine 1 % injection  5 mL IntraDERmal Once    sodium chloride flush  5-40 mL IntraVENous 2 times per day    b complex-C-folic acid  1 capsule Oral Daily    sacubitril-valsartan  1 tablet Oral BID    insulin lispro  0-8 Units SubCUTAneous TID WC    insulin lispro  0-4 Units SubCUTAneous Nightly      sodium chloride      sodium chloride      sodium chloride      nitroGLYCERIN      dextrose       sodium chloride flush, sodium chloride, sodium chloride flush, sodium chloride, acetaminophen **OR** acetaminophen, ipratropium-albuterol, sodium chloride flush, sodium chloride, perflutren lipid microspheres, heparin (porcine), glucose, dextrose bolus **OR** dextrose bolus, glucagon (rDNA), dextrose    Lab Data:  CBC:   Recent Labs     03/10/23  1142 03/11/23  0459 03/12/23  0450   WBC 16.3* 12.6* 11.4*   HGB 11.3* 10.9* 11.6*    323 290     BMP:    Recent Labs     03/10/23  0920 03/11/23  0459 03/12/23  0450   * 127* 126*   K 5.4* 5.7* 5.1   CO2 18* 20* 17*   BUN 81* 76* 74*   CREATININE 7.8* 6.9* 6.9*     LIVR:   Recent Labs     03/10/23  0920   AST 20   ALT 11     CXR 3/10/23:  Pulmonary edema    ECG: Sinus rhythm; poor RW progression; diffuse TW abn    ECG:  Sinus tachycardia with nonspecific ST-TW changes     Echo: pending     12/2022  TTE  Summary   Limited only f/u for LVEF and pericardial effusion. The left ventricular systolic function is severely reduced with an ejection   fraction of 25- 30%. Changes noted from previous echo on 2022 in left ventricular function. There is a trivial to small localized near left and right ventricle   pericardial effusion noted. No tamponade physiology. 2022 TTE   Summary   Definity® used for myocardial border enhancement. Left ventricular systolic function is severely reduced with a visually   estimated ejection fraction of 20-25 %. EF estimated by Jasmine's method at 24 %. The left ventricle is mildly dilated in size with normal wall thickness. Severe global hypokinesis with regional variation. Grade I diastolic dysfunction with normal LV pressure. There is no evidence of a left ventricular thrombus. Right ventricular systolic function is reduced. A 29 mm Langford Leyda S3 bioprosthetic aortic valve appears well seated   with normal Doppler values. Inadequate tricuspid valve regurgitation to estimate systolic pulmonary   artery pressure. There is a moderate left pleural effusion noted.         Stress Test:  2022          Summary    Severe LV dysfunction EF 26%    Global hypokinesis with regional variation, more pronounced in inferolateral    wall and apex    Large mainly fixed defect in inferior wall, extends to portions of lateral    wall and apex with moderate darrick-infarct ischemia        Overall, this would be considered an abnormal, higher risk, study                CARDIAC CATHETERIZATION REPORT      Procedure Date:  2022  Patient Name: Kitty Bellamy  MRN: [id number]      : 1970     FINDINGS      HEMODYNAMICS     CHAMBER PRESSURE SATURATION   RA  10 60%   RV  47/9     PA  50/20  67%   PW  19     AORTA  101/50  95%      NANCY CARDIAC OUTPUT  5.8 L/min   SVR  756    PVR  234       LVGRAM     LVEDP  21   GRADIENT ACROSS AORTIC VALVE  less than 5 mmHg   LV FUNCTION EF 20%   WALL MOTION  severe global hypokinesis with regional variation   MITRAL REGURGITATION  mild         CORONARY ARTERIES     LM Less than 10% [id number] stenosis            LAD Moderately calcified, 20 to 30% proximal-mid stenosis, distal vessel tapers at the apex with 60% diffuse disease. D1 has an ostial/proximal 75 to 80% stenosis. LCX  Calcified, proximal-mid 75 to 80% stenosis. Distal vessel is tortuous with 70 to 85% diffuse stenosis with more discrete stenosis noted distally. OM 1 is a very small vessel with ostial/proximal 80% stenosis and 60 to 70% diffuse disease in the [id number] vessel. Mariam Garrick RCA Dominant, calcified, tortuous, there is a proximal-mid stent with 60 to 70% in-stent restenosis. Distal vessel 20 to 30% stenosis         CONCLUSIONS:      Mild to moderately increased filling pressures  Patent RCA stent with moderate to borderline severe in-stent restenosis  Severe circumflex and D1 stenosis  Continue medical management, consider outpatient high risk PCI of above territories pending outpatient clinical follow-up. Currently medical management seems best given comorbidities and need for additional fluid removal.  If PCI is pursued, will likely need general anesthesia. Patient had difficulty lying still on Cath Lab table. Okay to resume Eliquis tomorrow, case/plan/findings discussed with patient and wife, they understand/agree, they stated it is incorrectly stated in chart the patient would refuse blood, he is okay to receive blood products if needed.   Continue beta-blocker and Entresto      Telemetry: Sinus rhythm  (Personally reviewed)    Assessment/Plan:    1) Elevated troponin  -2/2/ supply/demand mismatch in setting of known CAD and hypoxia  -continue medical management: plavix, Toprol, statin  -on nitrate (Isordil)  -echo to be completed  -known CAD with prior PCI's  -no c/o angina     2) H/O Atrial fibrillation  -on Eliquis  -maintaining SR     3) Ischemic cardiomyopathy  -last LVEF 25-30%  -continue Toprol, Entresto  -continue dialysis for volume control (per nephrology)     4) Primary hypertension  -currently better controlled  -continue medical management     5) ESRD on HD     6) Acute on chronic respiratory failure  -continue O2 as needed  -continues to improve with dialysis     7) Diabetes Mellitus  -Rx per Primary team    Electronically signed by JAY Tillman CNP on 3/12/2023 at 12:04 PM

## 2023-03-13 PROBLEM — R78.81 POSITIVE BLOOD CULTURE: Status: ACTIVE | Noted: 2023-03-13

## 2023-03-13 LAB
ALBUMIN SERPL-MCNC: 3.9 G/DL (ref 3.4–5)
ANION GAP SERPL CALCULATED.3IONS-SCNC: 23 MMOL/L (ref 3–16)
BASOPHILS ABSOLUTE: 0.2 K/UL (ref 0–0.2)
BASOPHILS RELATIVE PERCENT: 1.4 %
BUN BLDV-MCNC: 100 MG/DL (ref 7–20)
CALCIUM SERPL-MCNC: 8.6 MG/DL (ref 8.3–10.6)
CHLORIDE BLD-SCNC: 86 MMOL/L (ref 99–110)
CO2: 19 MMOL/L (ref 21–32)
CREAT SERPL-MCNC: 8.3 MG/DL (ref 0.9–1.3)
CULTURE, BLOOD 2: ABNORMAL
CULTURE, BLOOD 2: ABNORMAL
EOSINOPHILS ABSOLUTE: 0.3 K/UL (ref 0–0.6)
EOSINOPHILS RELATIVE PERCENT: 2.5 %
GFR SERPL CREATININE-BSD FRML MDRD: 7 ML/MIN/{1.73_M2}
GLUCOSE BLD-MCNC: 152 MG/DL (ref 70–99)
GLUCOSE BLD-MCNC: 178 MG/DL (ref 70–99)
GLUCOSE BLD-MCNC: 197 MG/DL (ref 70–99)
GLUCOSE BLD-MCNC: 199 MG/DL (ref 70–99)
GLUCOSE BLD-MCNC: 203 MG/DL (ref 70–99)
HCT VFR BLD CALC: 35.4 % (ref 40.5–52.5)
HEMOGLOBIN: 11.4 G/DL (ref 13.5–17.5)
LV EF: 28 %
LVEF MODALITY: NORMAL
LYMPHOCYTES ABSOLUTE: 1.1 K/UL (ref 1–5.1)
LYMPHOCYTES RELATIVE PERCENT: 9 %
MCH RBC QN AUTO: 29.2 PG (ref 26–34)
MCHC RBC AUTO-ENTMCNC: 32.2 G/DL (ref 31–36)
MCV RBC AUTO: 90.7 FL (ref 80–100)
MONOCYTES ABSOLUTE: 0.8 K/UL (ref 0–1.3)
MONOCYTES RELATIVE PERCENT: 6.1 %
NEUTROPHILS ABSOLUTE: 10.4 K/UL (ref 1.7–7.7)
NEUTROPHILS RELATIVE PERCENT: 81 %
ORGANISM: ABNORMAL
ORGANISM: ABNORMAL
PDW BLD-RTO: 17.2 % (ref 12.4–15.4)
PERFORMED ON: ABNORMAL
PHOSPHORUS: 10 MG/DL (ref 2.5–4.9)
PLATELET # BLD: 287 K/UL (ref 135–450)
PMV BLD AUTO: 7.7 FL (ref 5–10.5)
POTASSIUM SERPL-SCNC: 5.2 MMOL/L (ref 3.5–5.1)
RBC # BLD: 3.91 M/UL (ref 4.2–5.9)
SODIUM BLD-SCNC: 128 MMOL/L (ref 136–145)
WBC # BLD: 12.8 K/UL (ref 4–11)

## 2023-03-13 PROCEDURE — 2580000003 HC RX 258: Performed by: EMERGENCY MEDICINE

## 2023-03-13 PROCEDURE — 85025 COMPLETE CBC W/AUTO DIFF WBC: CPT

## 2023-03-13 PROCEDURE — 99232 SBSQ HOSP IP/OBS MODERATE 35: CPT | Performed by: NURSE PRACTITIONER

## 2023-03-13 PROCEDURE — 6360000002 HC RX W HCPCS: Performed by: INTERNAL MEDICINE

## 2023-03-13 PROCEDURE — C8929 TTE W OR WO FOL WCON,DOPPLER: HCPCS

## 2023-03-13 PROCEDURE — 6370000000 HC RX 637 (ALT 250 FOR IP): Performed by: INTERNAL MEDICINE

## 2023-03-13 PROCEDURE — 2700000000 HC OXYGEN THERAPY PER DAY

## 2023-03-13 PROCEDURE — 6370000000 HC RX 637 (ALT 250 FOR IP)

## 2023-03-13 PROCEDURE — 80069 RENAL FUNCTION PANEL: CPT

## 2023-03-13 PROCEDURE — 6370000000 HC RX 637 (ALT 250 FOR IP): Performed by: NURSE PRACTITIONER

## 2023-03-13 PROCEDURE — 99223 1ST HOSP IP/OBS HIGH 75: CPT | Performed by: INTERNAL MEDICINE

## 2023-03-13 PROCEDURE — 94660 CPAP INITIATION&MGMT: CPT

## 2023-03-13 PROCEDURE — 2580000003 HC RX 258: Performed by: INTERNAL MEDICINE

## 2023-03-13 PROCEDURE — 94761 N-INVAS EAR/PLS OXIMETRY MLT: CPT

## 2023-03-13 PROCEDURE — 2060000000 HC ICU INTERMEDIATE R&B

## 2023-03-13 PROCEDURE — 90935 HEMODIALYSIS ONE EVALUATION: CPT

## 2023-03-13 RX ORDER — OXYCODONE HYDROCHLORIDE 5 MG/1
5 TABLET ORAL EVERY 8 HOURS PRN
Status: DISCONTINUED | OUTPATIENT
Start: 2023-03-13 | End: 2023-03-16 | Stop reason: HOSPADM

## 2023-03-13 RX ORDER — DULOXETIN HYDROCHLORIDE 60 MG/1
60 CAPSULE, DELAYED RELEASE ORAL DAILY
Status: DISCONTINUED | OUTPATIENT
Start: 2023-03-14 | End: 2023-03-16 | Stop reason: HOSPADM

## 2023-03-13 RX ORDER — AZITHROMYCIN 250 MG/1
500 TABLET, FILM COATED ORAL DAILY
Status: COMPLETED | OUTPATIENT
Start: 2023-03-14 | End: 2023-03-16

## 2023-03-13 RX ORDER — OXYCODONE HYDROCHLORIDE 5 MG/1
5 TABLET ORAL ONCE
Status: COMPLETED | OUTPATIENT
Start: 2023-03-14 | End: 2023-03-14

## 2023-03-13 RX ORDER — ACETAMINOPHEN 325 MG/1
650 TABLET ORAL EVERY 4 HOURS PRN
Status: DISCONTINUED | OUTPATIENT
Start: 2023-03-13 | End: 2023-03-16 | Stop reason: HOSPADM

## 2023-03-13 RX ADMIN — SEVELAMER CARBONATE 1600 MG: 800 TABLET, FILM COATED ORAL at 09:31

## 2023-03-13 RX ADMIN — DULOXETINE HYDROCHLORIDE 30 MG: 30 CAPSULE, DELAYED RELEASE ORAL at 09:32

## 2023-03-13 RX ADMIN — NEPHROCAP 1 MG: 1 CAP ORAL at 09:32

## 2023-03-13 RX ADMIN — Medication 10 ML: at 09:38

## 2023-03-13 RX ADMIN — OXYCODONE HYDROCHLORIDE 5 MG: 5 TABLET ORAL at 21:57

## 2023-03-13 RX ADMIN — APIXABAN 2.5 MG: 5 TABLET, FILM COATED ORAL at 21:57

## 2023-03-13 RX ADMIN — PANTOPRAZOLE SODIUM 40 MG: 40 TABLET, DELAYED RELEASE ORAL at 05:08

## 2023-03-13 RX ADMIN — CEFTRIAXONE SODIUM 1000 MG: 1 INJECTION, POWDER, FOR SOLUTION INTRAMUSCULAR; INTRAVENOUS at 09:53

## 2023-03-13 RX ADMIN — SACUBITRIL AND VALSARTAN 1 TABLET: 24; 26 TABLET, FILM COATED ORAL at 21:57

## 2023-03-13 RX ADMIN — SACUBITRIL AND VALSARTAN 1 TABLET: 24; 26 TABLET, FILM COATED ORAL at 09:44

## 2023-03-13 RX ADMIN — CLOPIDOGREL BISULFATE 75 MG: 75 TABLET ORAL at 09:32

## 2023-03-13 RX ADMIN — SEVELAMER CARBONATE 1600 MG: 800 TABLET, FILM COATED ORAL at 16:42

## 2023-03-13 RX ADMIN — AZITHROMYCIN MONOHYDRATE 500 MG: 250 TABLET ORAL at 09:32

## 2023-03-13 RX ADMIN — ISOSORBIDE DINITRATE 20 MG: 20 TABLET ORAL at 09:32

## 2023-03-13 RX ADMIN — METOPROLOL SUCCINATE 100 MG: 50 TABLET, EXTENDED RELEASE ORAL at 09:32

## 2023-03-13 RX ADMIN — INSULIN LISPRO 2 UNITS: 100 INJECTION, SOLUTION INTRAVENOUS; SUBCUTANEOUS at 12:22

## 2023-03-13 RX ADMIN — QUETIAPINE FUMARATE 50 MG: 25 TABLET ORAL at 21:57

## 2023-03-13 RX ADMIN — SEVELAMER CARBONATE 1600 MG: 800 TABLET, FILM COATED ORAL at 12:23

## 2023-03-13 RX ADMIN — ISOSORBIDE DINITRATE 20 MG: 20 TABLET ORAL at 21:58

## 2023-03-13 RX ADMIN — LOPERAMIDE HYDROCHLORIDE 2 MG: 2 CAPSULE ORAL at 16:44

## 2023-03-13 RX ADMIN — ISOSORBIDE DINITRATE 20 MG: 20 TABLET ORAL at 16:42

## 2023-03-13 RX ADMIN — METOPROLOL SUCCINATE 100 MG: 50 TABLET, EXTENDED RELEASE ORAL at 21:56

## 2023-03-13 RX ADMIN — PRAVASTATIN SODIUM 40 MG: 40 TABLET ORAL at 09:32

## 2023-03-13 RX ADMIN — Medication 10 ML: at 21:58

## 2023-03-13 RX ADMIN — APIXABAN 2.5 MG: 5 TABLET, FILM COATED ORAL at 09:32

## 2023-03-13 ASSESSMENT — ENCOUNTER SYMPTOMS
COUGH: 1
BACK PAIN: 1
TROUBLE SWALLOWING: 0
STRIDOR: 0
WHEEZING: 1
GASTROINTESTINAL NEGATIVE: 1
SHORTNESS OF BREATH: 1
CHOKING: 0

## 2023-03-13 ASSESSMENT — PAIN DESCRIPTION - ORIENTATION: ORIENTATION: LOWER

## 2023-03-13 ASSESSMENT — PAIN SCALES - GENERAL: PAINLEVEL_OUTOF10: 8

## 2023-03-13 ASSESSMENT — PAIN DESCRIPTION - LOCATION: LOCATION: BACK

## 2023-03-13 NOTE — CONSULTS
PSYCHIATRIC CONSULTATION  3/10/2023  8:49 AM   3/13/2023    HPI:   Maribel Calderon 45 y.o.  male with past medical history of anxiety, depression, COPD, systolic heart failure, ESRD on HD, coronary artery disease status post TAVR, A-fib on Eliquis, history of CVA admitted to [facility] and found to have left lower lobe pneumonia with sepsis. Psychiatry consultation due to suicidal ideation. Patient said that he has been having suicidal ideation for the last 6 months due to significant complicated past medical history, tired from multiple admissions, and dialysis treatment three times a week. Patient denies attempting suicide in the past.  Patient said that he does not have a plan for suicide. Patient denies being admitted to psychiatry unit in the past.  Patient said that he takes depression and anxiety medications but cannot recall their names. Per chart, patient takes seroquel 50mg and cymbalta 30mg. Patient mentioned that he does have 3 guns at home, but does not have a plan to kill himself because it is a \"sin to commit suicide\". Patient did complain of being sad, sleep disturbance and low appetite because of complicated past medical history. Patient reported mild anxiety at baseline. Upon reexamination, patient said that he wouldn't commit suicide because he loves his grandchildren. Patient denies any hallucinations. Patient has not seen a counselor or therapist in the past.    PAST PSYCHIATRIC HISTORY  Depression  Anxiety  FAMILY PSYCHIATRIC HISTORY  Wife suffers from depression. Denies other psych family history. SOCIAL HISTORY    Patient denies any recent tobacco use, but used to smoke in the past and quit in 2020. Patient denies any recent alcohol use, marijuana use, cocaine use, meth use, heroin use or other illicit drug use.     OBJECTIVE    Physical  VITALS:  /65   Pulse 64   Temp 97.6 °F (36.4 °C) (Axillary)   Resp 18   Ht 5' 9\" (1.753 m)   Wt 224 lb 1.6 oz (101.7 kg) SpO2 99%   BMI 33.09 kg/m²   Patients appearance disheveled seated in bed.     Musculoskeletal  Patient gait normal   STRENGTH: normal throughout upper and lower extremities TONE normal    Mental Status Examination:   No current loose associations  Concentration Intact   Thoughts are within normal limits  Insight and Judgement impaired insight  Knowledge: limited  Language: 0 - no aphasia, normal  Speech: appropriate   Mood depressed, affect congruent with mood  Orientation: oriented to person, place, time/date, and situation   Hallucinations Absent   Thought Processes: Goal-Directed  Memory intact  suicidal ideations present, no plan  Homicidal ideations no           Data  Labs:   Admission on 03/10/2023   Component Date Value Ref Range Status    WBC 03/10/2023 16.3 (A)  4.0 - 11.0 K/uL Final    RBC 03/10/2023 3.82 (A)  4.20 - 5.90 M/uL Final    Hemoglobin 03/10/2023 11.3 (A)  13.5 - 17.5 g/dL Final    Hematocrit 03/10/2023 35.0 (A)  40.5 - 52.5 % Final    MCV 03/10/2023 91.5  80.0 - 100.0 fL Final    MCH 03/10/2023 29.5  26.0 - 34.0 pg Final    MCHC 03/10/2023 32.2  31.0 - 36.0 g/dL Final    RDW 03/10/2023 16.4 (A)  12.4 - 15.4 % Final    Platelets 59/07/5276 287  135 - 450 K/uL Final    MPV 03/10/2023 7.6  5.0 - 10.5 fL Final    Neutrophils % 03/10/2023 96.4  % Final    Lymphocytes % 03/10/2023 1.6  % Final    Monocytes % 03/10/2023 1.5  % Final    Eosinophils % 03/10/2023 0.2  % Final    Basophils % 03/10/2023 0.3  % Final    Neutrophils Absolute 03/10/2023 15.7 (A)  1.7 - 7.7 K/uL Final    Lymphocytes Absolute 03/10/2023 0.3 (A)  1.0 - 5.1 K/uL Final    Monocytes Absolute 03/10/2023 0.2  0.0 - 1.3 K/uL Final    Eosinophils Absolute 03/10/2023 0.0  0.0 - 0.6 K/uL Final    Basophils Absolute 03/10/2023 0.0  0.0 - 0.2 K/uL Final    Sodium 03/10/2023 128 (A)  136 - 145 mmol/L Final    Potassium reflex Magnesium 03/10/2023 5.4 (A)  3.5 - 5.1 mmol/L Final    Comment: Specimen hemolysis has exceeded the interference as defined by Roche. Value may be falsely increased. Suggest recollection if clinically  indicated. Chloride 03/10/2023 86 (A)  99 - 110 mmol/L Final    CO2 03/10/2023 18 (A)  21 - 32 mmol/L Final    Anion Gap 03/10/2023 24 (A)  3 - 16 Final    Glucose 03/10/2023 363 (A)  70 - 99 mg/dL Final    BUN 03/10/2023 81 (A)  7 - 20 mg/dL Final    Creatinine 03/10/2023 7.8 (A)  0.9 - 1.3 mg/dL Final    Est, Glom Filt Rate 03/10/2023 8 (A)  >60 Final    Comment: Pediatric calculator link  Deonnaow.[email or web address]. org/professionals/kdoqi/gfr_calculatorped  Effective Oct 3, 2022  These results are not intended for use in patients  <25years of age. eGFR results are calculated without  a race factor using the 2021 CKD-EPI equation. Careful  clinical correlation is recommended, particularly when  comparing to results calculated using previous equations. The CKD-EPI equation is less accurate in patients with  extremes of muscle mass, extra-renal metabolism of  creatinine, excessive creatinine ingestion, or following  therapy that affects renal tubular secretion. Calcium 03/10/2023 9.3  8.3 - 10.6 mg/dL Final    Total Protein 03/10/2023 7.6  6.4 - 8.2 g/dL Final    Albumin 03/10/2023 3.9  3.4 - 5.0 g/dL Final    Albumin/Globulin Ratio 03/10/2023 1.1  1.1 - 2.2 Final    Total Bilirubin 03/10/2023 0.3  0.0 - 1.0 mg/dL Final    Alkaline Phosphatase 03/10/2023 60  40 - 129 U/L Final    ALT 03/10/2023 11  10 - 40 U/L Final    AST 03/10/2023 20  15 - 37 U/L Final    Comment: Specimen hemolysis has exceeded the interference as defined by Roche. Value may be falsely increased. Suggest recollection if clinically  indicated. Lactic Acid, Sepsis 03/10/2023 2.0 (A)  0.4 - 1.9 mmol/L Final    Blood Culture, Routine 03/10/2023 No Growth to date. Any change in status will be called.    Preliminary    Culture, Blood 2 03/10/2023  (A)   Preliminary                    Value:Gram stain Aerobic bottle:  Gram positive cocci in clusters  resembling Staphylococcus  Information to follow  Gram stain Anaerobic bottle:  Gram positive cocci in clusters  resembling Staphylococcus  Information to follow      Organism 03/10/2023 Staphylococcus epidermidis DNA Detected (A)   Preliminary    Culture, Blood 2 03/10/2023 See additional report for complete BCID panel. Preliminary    Organism 03/10/2023 Staphylococcus epidermidis (A)   Preliminary    Culture, Blood 2 03/10/2023    Preliminary                    Value:POSITIVE for  This organism was isolated in one set. Susceptibility testing is not routinely done as this  organism frequently represents skin contamination. Additional testing can be ordered by calling the  Microbiology Department. Troponin 03/10/2023 0.34 (A)  <0.01 ng/mL Final    Methodology by Troponin T    Pro-BNP 03/10/2023 >70,000 (A)  0 - 124 pg/mL Final    Comment: Methodology by NT-proBNP    An age-independent cutoff point of 300 pg/ml has a 98%  negative predictive value excluding acute heart failure. Values exceeding the age-related cutoff values (450 pg/mL if  age<50, 900 if 50-75 and 1800 if >75) has 90% sensitivity and  84% specificity for diagnosing acute HF.  In patients with  renal compromise (eGFR<60) values greater than 1200pg/ml have  a diagnostic sensitivity and specificity of 89% and 72% for  acute HF.      pH, Blade 03/10/2023 7.323 (A)  7.350 - 7.450 Final    pCO2, Blade 03/10/2023 37.3 (A)  40.0 - 50.0 mmHg Final    pO2, Blade 03/10/2023 74.1 (A)  25.0 - 40.0 mmHg Final    HCO3, Venous 03/10/2023 18.9 (A)  23.0 - 29.0 mmol/L Final    Base Excess, Blade 03/10/2023 -6.5 (A)  -3.0 - 3.0 mmol/L Final    O2 Sat, Blade 03/10/2023 93  Not Established % Final    Carboxyhemoglobin 03/10/2023 3.9 (A)  0.0 - 1.5 % Final    Comment:      0.0-1.5  (Smokers 1.5-5.0)      MetHgb, Blade 03/10/2023 0.2  <1.5 % Final    TC02 (Calc), Blade 03/10/2023 20  Not Established mmol/L Final    O2 Therapy 03/10/2023 Unknown   Final    Ventricular Rate 03/10/2023 106  BPM Final    Atrial Rate 03/10/2023 106  BPM Final    P-R Interval 03/10/2023 170  ms Final    QRS Duration 03/10/2023 96  ms Final    Q-T Interval 03/10/2023 354  ms Final    QTc Calculation (Bazett) 03/10/2023 470  ms Final    P Axis 03/10/2023 79  degrees Final    R Axis 03/10/2023 45  degrees Final    T Axis 03/10/2023 78  degrees Final    Diagnosis 03/10/2023  Poor data quality, interpretation may be adversely affectedSinus tachycardiaNonspecific ST and T wave abnormalityConfirmed by Maryana Givens MD, [facility] ([id number]) on 3/11/2023 8:47:58 AM   Final    SARS-CoV-2 RNA, RT PCR 03/10/2023 NOT DETECTED  NOT DETECTED Final    Comment: Not Detected results do not preclude SARS-CoV-2 infection and  should not be used as the sole basis for patient management  decisions. Results must be combined with clinical observations,  patient history, and epidemiological information. Testing was performed using CORINNE LOREN SARS-CoV-2 and Influenza A/B  nucleic acid assay. This test is a multiplex Real-Time Reverse  Transcriptase Polymerase Chain Reaction (RT-PCR)-based in vitro  diagnostic test intended for the qualitative detection of nucleic  acids from SARS-CoV-2, influenza A, and influenza B in nasopharyngeal  and nasal swab specimens for use under the FDAs Emergency Use  Authorization (EUA) only.     Patient Fact Sheet:  FindDrives.pl  Provider Fact Sheet: FindDrives.pl  EUA: FindDrives.pl  IFU: FindDrives.pl    Methodology:  RT-PCR      INFLUENZA A 03/10/2023 NOT DETECTED  NOT DETECTED Final    INFLUENZA B 03/10/2023 NOT DETECTED  NOT DETECTED Final    WBC 03/11/2023 12.6 (A)  4.0 - 11.0 K/uL Final    RBC 03/11/2023 3.71 (A)  4.20 - 5.90 M/uL Final    Hemoglobin 03/11/2023 10.9 (A)  13.5 - 17.5 g/dL Final    Hematocrit 03/11/2023 33.8 (A)  40.5 - 52.5 % Final    MCV 03/11/2023 91.1  80.0 - 100.0 fL Final    MCH 03/11/2023 29.5  26.0 - 34.0 pg Final    MCHC 03/11/2023 32.4  31.0 - 36.0 g/dL Final    RDW 03/11/2023 16.9 (A)  12.4 - 15.4 % Final    Platelets 37/76/4981 323  135 - 450 K/uL Final    MPV 03/11/2023 7.8  5.0 - 10.5 fL Final    Neutrophils % 03/11/2023 88.2  % Final    Lymphocytes % 03/11/2023 4.8  % Final    Monocytes % 03/11/2023 6.8  % Final    Eosinophils % 03/11/2023 0.1  % Final    Basophils % 03/11/2023 0.1  % Final    Neutrophils Absolute 03/11/2023 11.1 (A)  1.7 - 7.7 K/uL Final    Lymphocytes Absolute 03/11/2023 0.6 (A)  1.0 - 5.1 K/uL Final    Monocytes Absolute 03/11/2023 0.9  0.0 - 1.3 K/uL Final    Eosinophils Absolute 03/11/2023 0.0  0.0 - 0.6 K/uL Final    Basophils Absolute 03/11/2023 0.0  0.0 - 0.2 K/uL Final    Sodium 03/11/2023 127 (A)  136 - 145 mmol/L Final    Potassium 03/11/2023 5.7 (A)  3.5 - 5.1 mmol/L Final    Chloride 03/11/2023 86 (A)  99 - 110 mmol/L Final    CO2 03/11/2023 20 (A)  21 - 32 mmol/L Final    Anion Gap 03/11/2023 21 (A)  3 - 16 Final    Glucose 03/11/2023 262 (A)  70 - 99 mg/dL Final    BUN 03/11/2023 76 (A)  7 - 20 mg/dL Final    Creatinine 03/11/2023 6.9 (A)  0.9 - 1.3 mg/dL Final    Est, Glom Filt Rate 03/11/2023 9 (A)  >60 Final    Comment: Pediatric calculator link  Jose.at. org/professionals/kdoqi/gfr_calculatorped  Effective Oct 3, 2022  These results are not intended for use in patients  <25years of age. eGFR results are calculated without  a race factor using the 2021 CKD-EPI equation. Careful  clinical correlation is recommended, particularly when  comparing to results calculated using previous equations. The CKD-EPI equation is less accurate in patients with  extremes of muscle mass, extra-renal metabolism of  creatinine, excessive creatinine ingestion, or following  therapy that affects renal tubular secretion.       Calcium 03/11/2023 9.2  8.3 - 10.6 mg/dL Final    Phosphorus 03/11/2023 8.3 (A)  2.5 - 4.9 mg/dL Final    Albumin 03/11/2023 3.9  3.4 - 5.0 g/dL Final    Troponin 03/11/2023 0.27 (A)  <0.01 ng/mL Final    Methodology by Troponin T    Procalcitonin 03/11/2023 1.06 (A)  0.00 - 0.15 ng/mL Final    Comment: Suspected Sepsis:  Low likelihood of sepsis  <.50 ng/mL    Increased likelihood of sepsis 0.50-2.00 ng/mL  Antibiotics encouraged    High risk of sepsis/shock   >2.00 ng/mL  Antibiotics strongly encouraged    Suspected Lower Respiratory Tract Infections:  Low likelihood of bacterial infection  <0.24 ng/mL    Increased likelihood of bacterial infection >0.24 ng/mL  Antibiotics encouraged    With successful antibiotic therapy, PCT levels should decrease  rapidly. (Half-life of 24 to 36 hours.)    Procalcitonin values from samples collected within the first  6 hours of systemic infection may still be low. Retesting may be indicated. Values from day 1 and day 4 can be entered into the Change in  Procalcitonin Calculator to determine the patient's  Mortality Risk Prognosis  (www.[facility]pct-calculator. com)    In healthy neonates, plasma Procalcitonin (PCT) concentrations  increase gradually after birth, reaching peak values at about  24 hours of age then decrease to normal values below 0.5                            ng/mL  by 48-72 hours of age.       Lactic Acid, Sepsis 03/11/2023 1.5  0.4 - 1.9 mmol/L Final    POC Glucose 03/10/2023 368 (A)  70 - 99 mg/dl Final    Performed on 03/10/2023 ACCU-CHEK   Final    Ventricular Rate 03/11/2023 68  BPM Final    Atrial Rate 03/11/2023 68  BPM Final    P-R Interval 03/11/2023 168  ms Final    QRS Duration 03/11/2023 98  ms Final    Q-T Interval 03/11/2023 444  ms Final    QTc Calculation (Bazett) 03/11/2023 472  ms Final    P Axis 03/11/2023 24  degrees Final    R Axis 03/11/2023 17  degrees Final    T Axis 03/11/2023 169  degrees Final    Diagnosis 03/11/2023 Normal sinus rhythmPoor R wave progressionT wave abnormality, consider inferolateral ischemiaAbnormal ECGConfirmed by Pedro Orellana MD, [facility] ([id number]) on 3/12/2023 9:21:57 AM   Final    POC Glucose 03/11/2023 290 (A)  70 - 99 mg/dl Final    Performed on 03/11/2023 ACCU-CHEK   Final    POC Glucose 03/11/2023 258 (A)  70 - 99 mg/dl Final    Performed on 03/11/2023 ACCU-CHEK   Final    Report 03/10/2023 SEE IMAGE   Final    POC Glucose 03/11/2023 277 (A)  70 - 99 mg/dl Final    Performed on 03/11/2023 ACCU-CHEK   Final    WBC 03/12/2023 11.4 (A)  4.0 - 11.0 K/uL Final    RBC 03/12/2023 3.94 (A)  4.20 - 5.90 M/uL Final    Hemoglobin 03/12/2023 11.6 (A)  13.5 - 17.5 g/dL Final    Hematocrit 03/12/2023 36.3 (A)  40.5 - 52.5 % Final    MCV 03/12/2023 92.2  80.0 - 100.0 fL Final    MCH 03/12/2023 29.4  26.0 - 34.0 pg Final    MCHC 03/12/2023 31.9  31.0 - 36.0 g/dL Final    RDW 03/12/2023 16.9 (A)  12.4 - 15.4 % Final    Platelets 13/63/0036 290  135 - 450 K/uL Final    MPV 03/12/2023 7.6  5.0 - 10.5 fL Final    Neutrophils % 03/12/2023 77.1  % Final    Lymphocytes % 03/12/2023 11.7  % Final    Monocytes % 03/12/2023 7.9  % Final    Eosinophils % 03/12/2023 2.5  % Final    Basophils % 03/12/2023 0.8  % Final    Neutrophils Absolute 03/12/2023 8.8 (A)  1.7 - 7.7 K/uL Final    Lymphocytes Absolute 03/12/2023 1.3  1.0 - 5.1 K/uL Final    Monocytes Absolute 03/12/2023 0.9  0.0 - 1.3 K/uL Final    Eosinophils Absolute 03/12/2023 0.3  0.0 - 0.6 K/uL Final    Basophils Absolute 03/12/2023 0.1  0.0 - 0.2 K/uL Final    Sodium 03/12/2023 126 (A)  136 - 145 mmol/L Final    Potassium 03/12/2023 5.1  3.5 - 5.1 mmol/L Final    Chloride 03/12/2023 88 (A)  99 - 110 mmol/L Final    CO2 03/12/2023 17 (A)  21 - 32 mmol/L Final    Anion Gap 03/12/2023 21 (A)  3 - 16 Final    Glucose 03/12/2023 294 (A)  70 - 99 mg/dL Final    BUN 03/12/2023 74 (A)  7 - 20 mg/dL Final    Creatinine 03/12/2023 6.9 (A)  0.9 - 1.3 mg/dL Final    Est, Glom Filt Rate 03/12/2023 9 (A)  >60 Final    Comment: Pediatric calculator link  [email or web address]. org/professionals/kdoqi/gfr_calculatorped  Effective Oct 3, 2022  These results are not intended for use in patients  <25years of age. eGFR results are calculated without  a race factor using the 2021 CKD-EPI equation. Careful  clinical correlation is recommended, particularly when  comparing to results calculated using previous equations. The CKD-EPI equation is less accurate in patients with  extremes of muscle mass, extra-renal metabolism of  creatinine, excessive creatinine ingestion, or following  therapy that affects renal tubular secretion.       Calcium 03/12/2023 8.8  8.3 - 10.6 mg/dL Final    Phosphorus 03/12/2023 9.7 (A)  2.5 - 4.9 mg/dL Final    Albumin 03/12/2023 3.9  3.4 - 5.0 g/dL Final    POC Glucose 03/11/2023 177 (A)  70 - 99 mg/dl Final    Performed on 03/11/2023 ACCU-CHEK   Final    POC Glucose 03/12/2023 242 (A)  70 - 99 mg/dl Final    Performed on 03/12/2023 ACCU-CHEK   Final    POC Glucose 03/12/2023 231 (A)  70 - 99 mg/dl Final    Performed on 03/12/2023 ACCU-CHEK   Final    POC Glucose 03/12/2023 201 (A)  70 - 99 mg/dl Final    Performed on 03/12/2023 ACCU-CHEK   Final    POC Glucose 03/12/2023 193 (A)  70 - 99 mg/dl Final    Performed on 03/12/2023 ACCU-CHEK   Final    POC Glucose 03/13/2023 199 (A)  70 - 99 mg/dl Final    Performed on 03/13/2023 ACCU-CHEK   Final    POC Glucose 03/13/2023 203 (A)  70 - 99 mg/dl Final    Performed on 03/13/2023 ACCU-CHEK   Final            Medications  Current Facility-Administered Medications: oxyCODONE (ROXICODONE) immediate release tablet 5 mg, 5 mg, Oral, Q8H PRN **OR** acetaminophen (TYLENOL) tablet 650 mg, 650 mg, Oral, Q4H PRN  cefTRIAXone (ROCEPHIN) 1,000 mg in sodium chloride 0.9 % 50 mL IVPB (mini-bag), 1,000 mg, IntraVENous, Q24H  sevelamer (RENVELA) tablet 1,600 mg, 1,600 mg, Oral, TID WC  loperamide (IMODIUM) capsule 2 mg, 2 mg, Oral, 4x Daily PRN  sodium chloride flush 0.9 % injection 5-40 mL, 5-40 mL, IntraVENous, 2 times per day  sodium chloride flush 0.9 % injection 5-40 mL, 5-40 mL, IntraVENous, PRN  0.9 % sodium chloride infusion, , IntraVENous, PRN  vancomycin 1500 mg in sodium chloride 0.9% 300 mL IVPB, 15 mg/kg, IntraVENous, Once  sodium chloride flush 0.9 % injection 5-40 mL, 5-40 mL, IntraVENous, 2 times per day  sodium chloride flush 0.9 % injection 5-40 mL, 5-40 mL, IntraVENous, PRN  0.9 % sodium chloride infusion, , IntraVENous, PRN  apixaban (ELIQUIS) tablet 2.5 mg, 2.5 mg, Oral, BID  clopidogrel (PLAVIX) tablet 75 mg, 75 mg, Oral, Daily  DULoxetine (CYMBALTA) extended release capsule 30 mg, 30 mg, Oral, Daily  ipratropium-albuterol (DUONEB) nebulizer solution 3 mL, 1 vial, Inhalation, Q6H PRN  isosorbide dinitrate (ISORDIL) tablet 20 mg, 20 mg, Oral, TID  metoprolol succinate (TOPROL XL) extended release tablet 100 mg, 100 mg, Oral, BID  pantoprazole (PROTONIX) tablet 40 mg, 40 mg, Oral, QAM AC  pravastatin (PRAVACHOL) tablet 40 mg, 40 mg, Oral, Daily  QUEtiapine (SEROQUEL) tablet 50 mg, 50 mg, Oral, Nightly  lidocaine 1 % injection 5 mL, 5 mL, IntraDERmal, Once  sodium chloride flush 0.9 % injection 5-40 mL, 5-40 mL, IntraVENous, 2 times per day  sodium chloride flush 0.9 % injection 5-40 mL, 5-40 mL, IntraVENous, PRN  0.9 % sodium chloride infusion, 25 mL, IntraVENous, PRN  b complex-C-folic acid (NEPHROCAPS) capsule 1 mg, 1 capsule, Oral, Daily  perflutren lipid microspheres (DEFINITY) injection 1.5 mL, 1.5 mL, IntraVENous, ONCE PRN  sacubitril-valsartan (ENTRESTO) 24-26 MG per tablet 1 tablet, 1 tablet, Oral, BID  nitroGLYCERIN 200 mcg/ml in dextrose 5%, 5-200 mcg/min, IntraVENous, Continuous  heparin (porcine) injection 3,600 Units, 3,600 Units, IntraCATHeter, PRN  glucose chewable tablet 16 g, 4 tablet, Oral, PRN  dextrose bolus 10% 125 mL, 125 mL, IntraVENous, PRN **OR** dextrose bolus 10% 250 mL, 250 mL, IntraVENous, PRN  glucagon (rDNA) injection 1 mg, 1 mg, SubCUTAneous, PRN  dextrose 10 % infusion, , IntraVENous, Continuous PRN  insulin lispro (HUMALOG) injection vial 0-8 Units, 0-8 Units, SubCUTAneous, TID WC  insulin lispro (HUMALOG) injection vial 0-4 Units, 0-4 Units, SubCUTAneous, Nightly    ASSESSMENT AND PLAN    Principal Problem:    Severe sepsis (HCC)  Active Problems:    CAD in native artery    Acute on chronic systolic HF (heart failure) (HCC)    Elevated troponin  Resolved Problems:    * No resolved hospital problems. *    Depression  Anxiety  Suicidal ideation due to complicated past medical history and tired of multiple hospital admissions and HD treatments three times a week. Adjusted cymbalta to 60mg daily. 2. called wife and advised her to hide guns at home. Wife said that she will hide two of the guns, but there's a third one she does not know where it is. 3. Will refer patient to counselor therapist in outpatient setting for psychotherapy sessions and for patient to learn coping strategies. 4. Discharge planning is pending per primary team.  5. Suicidal ideation present, without plan, no intentions. Patient said he will never commit suicide because it is a sin. Patient was energetic and happy when he was talking about his grandchildren. 6. Time spent with patient 45 minutes. Patient care was discussed with Dr. Cristofer Fajardo . Thank you for your supervision.        Dusty Hidalgo, [address] [place] [address]  3/13/2023

## 2023-03-13 NOTE — DISCHARGE INSTRUCTIONS
Please take lactobacillus (probiotic) for the next week to help with your diarrhea  Your cymbalta was changed from 30mg to 60mg, a new prescription was sent to your pharmacy  Continue to take [facility] for your heart failure  You are scheduled to follow up with cardiology on 3/28/23    Heart Failure Resources:    Heart Failure Interactive Workbook:   Go to [email or web address]-heartfailure for a Free Heart Failure Interactive Workbook provided by Melissa. This interactive workbook will provide information on Healthier Living with Heart Failure. Please copy and paste link into search bar. Use your mouse to scroll through the pages. HF Titus stephanie:   Heart Failure Free smart phone stephanie available for iPhone and Android download. Use your phone to track sodium intake, fluid intake, symptoms, and weight. Low Sodium Diet:  Go to www. [employer]. [employer] website for [employer] which is Low Sodium! [employer] is a dialysis company, but this website offers free seasonal cookbooks. Each quarter, they will release 25-30 new recipes with a breakdown of calories, sodium, and glucose. Recipes:   Go to www.[employer]/recipes website for free recipes. Home Exercise Program:     -Identification of Green/yellow/Red zones. You should be able to identify when you feel good (green zone), if you have 1-2 symptoms of HF (yellow zone), or if you are in need of medical attention (red zone). In your CHF education folder you were provided a stop light tool to outline this information.     -We want to you to rate your exertion levels. Our therapy team has discussed means of identification with you such as the \"John scale. \"  The John rating scale ranges from 6 to 20, where 6 means \"no exertion at all\" and 20 means \"maximal exertion. \" The goal is to use this to gauge how much effort it is taking for you to do your normal daily tasks.    You should be able to recognize when too much exertion is being expended.    -Elements of Energy Conservation  Prioritize/Plan: Decide what needs to be done today, and what can wait for a later date, write to do lists, Plan ahead to avoid extra trips, Gather supplies and equipment needed before starting an activity. Position: Avoid tiring and awkward posture that may impair breathing  Pace: Slow and steady pace, never rushing! Pursed lip breathing.   Pursed Lip Breathing: \"Smell the roses, blow out the candles\"

## 2023-03-13 NOTE — PROGRESS NOTES
03/13/23 0841   NIV Type   Equipment Type v60   Mode Bilevel   Mask Type Full face mask   Mask Size Large   Bonnet size Large   Settings/Measurements   PIP Observed 11 cm H20   IPAP 10 cmH20   CPAP/EPAP 5 cmH2O   Vt (Measured) 526 mL   Rate Ordered 10   Resp 22   FiO2  40 %   I Time/ I Time % 1 s   Minute Volume (L/min) 12.2 Liters   Mask Leak (lpm) 40 lpm   Comfort Level Good   Using Accessory Muscles No   SpO2 99   Patient's Home Machine No   Alarm Settings   Alarms On Y   Low Pressure (cmH2O) 6 cmH2O   High Pressure (cmH2O) 30 cmH2O   Apnea (secs) 20 secs   RR Low (bpm) 6   RR High (bpm) 40 br/min

## 2023-03-13 NOTE — ACP (ADVANCE CARE PLANNING)
Advance Care Planning     General Advance Care Planning (ACP) Conversation    Date of Conversation: 3/10/2023  Conducted with: Patient with Decision Making Capacity    Healthcare Decision Maker:    Primary Decision Maker: Crystal Ann - Spouse - [phone]    Secondary Decision Maker: dian polk - Step Child - [phone]  Click here to complete Healthcare Decision Makers including selection of the Healthcare Decision Maker Relationship (ie \"Primary\"). Today we documented Decision Maker(s) consistent with ACP documents on file. Content/Action Overview: Has ACP document(s) on file - reflects the patient's care preferences  Reviewed DNR/DNI and patient elects DNR order - completed portable DNR form & placed order  treatment goals, benefit/burden of treatment options, artificial nutrition, ventilation preferences, hospitalization preferences, resuscitation preferences, and end of life care preferences (vegetative state/imminent death)    Discussed goals of care with patient. Patient states if it wasn't for his wife, he would have stopped aggressive care and dialysis a long time ago. He says she won't let him stop care. We discussed code status, especially given that he says he is personally not interested in aggressive care. Patient states that if there is even a 10% chance he would have no quality of life after a resuscitation attempt, then he doesn't want it at all. I explained that there is no way for use to know how a resuscitation attempt is going to play out when its started, but statistically speaking, given his severe co-morbidities there is greater than a 10% chance he would not have a meaningful life (to him - fully independent, able to interact with his grand kids) following a resuscitation attempt, or even a darrick-arrest intubation. Patient states in that case he does not want to be intubated or resuscitated.   I explained to him that we would call this a Limited code status - no intubation, no chest compressions, no defibrillation (does not have ICD), and no ACLS medications intended to restart the heart. He is in agreement with this. He wishes to continue with dialysis at this time as well as potential future procedures (he wants to determine any procedures on a case by case basis depending on risk). Code status changed to Limited x 4 Nos. [place] DNR form signed and placed in hard chart.     Length of Voluntary ACP Conversation in minutes:  <16 minutes (Non-Billable)    Jen Salazar, APRN - CNP

## 2023-03-13 NOTE — PROGRESS NOTES
[facility] 79   Daily Progress Note      Admit Date:  3/10/2023    Reason for follow up visit: SOB    CC: \"I'm doing okay. \"    46 y/o male with PMH significant for CAD/prior stent to LAD and RCA, aortic stenosis and S/P TAVR in 2019, PVD/prior PTA to R iliac artery,  ESRD on HD, diabetes mellitus, COPD, HTN, HLP and sleep apnea who was admitted with increasing SOB and chest pressure. He presented to ED and underwent urgent dialysis. Troponin level and ProBNP elevated and similar to prior values with slightly elevated troponin level from his prior. He was placed on BiPap, but now on O2. He has had dialysis. Interval History:  Pt. seen and examined; records reviewed  BP stable. Denies chest pain   SOB improving; remains on O2  Scheduled for dialysis today. Seen by psychiatry today    Subjective:  Pt with no acute overnight cardiac events. Denies chest pain, cough,palpitations or dizziness  + SOB improving  + voicing frustration with being here. Objective:   /65   Pulse 64   Temp 97.6 °F (36.4 °C) (Axillary)   Resp 18   Ht 5' 9\" (1.753 m)   Wt 224 lb 1.6 oz (101.7 kg)   SpO2 99%   BMI 33.09 kg/m²     Intake/Output Summary (Last 24 hours) at 3/13/2023 1357  Last data filed at 3/13/2023 6496  Gross per 24 hour   Intake 850 ml   Output 0 ml   Net 850 ml     Wt Readings from Last 3 Encounters:   03/13/23 224 lb 1.6 oz (101.7 kg)   02/06/23 221 lb 9 oz (100.5 kg)   01/28/23 227 lb (103 kg)       Physical Exam:  General: In no acute distress. Awake, alert, and oriented X4. Sitting upon side of bed  Skin:  Warm and dry. Neck:  Supple and thick. JVD hard to assess d/t body habitus  Chest:  Lungs fairly clear to auscultation. No wheezes/rhonchi/rales  Cardiovascular:  RRR. Normal S1 and S2; II/VI systolic murmur. Abdomen: obese, soft, nontender, + bowel sounds. Extremities:  Trace bilateral ankle edema. 1+ bilateral DP pulses.  .     Medications:    [START ON 3/14/2023] DULoxetine  60 mg Oral Daily    cefTRIAXone (ROCEPHIN) IV  1,000 mg IntraVENous Q24H    sevelamer  1,600 mg Oral TID WC    sodium chloride flush  5-40 mL IntraVENous 2 times per day    vancomycin  15 mg/kg IntraVENous Once    sodium chloride flush  5-40 mL IntraVENous 2 times per day    apixaban  2.5 mg Oral BID    clopidogrel  75 mg Oral Daily    isosorbide dinitrate  20 mg Oral TID    metoprolol succinate  100 mg Oral BID    pantoprazole  40 mg Oral QAM AC    pravastatin  40 mg Oral Daily    QUEtiapine  50 mg Oral Nightly    lidocaine 1 % injection  5 mL IntraDERmal Once    sodium chloride flush  5-40 mL IntraVENous 2 times per day    b complex-C-folic acid  1 capsule Oral Daily    sacubitril-valsartan  1 tablet Oral BID    insulin lispro  0-8 Units SubCUTAneous TID WC    insulin lispro  0-4 Units SubCUTAneous Nightly      sodium chloride      sodium chloride      sodium chloride      nitroGLYCERIN      dextrose       oxyCODONE **OR** acetaminophen, loperamide, sodium chloride flush, sodium chloride, sodium chloride flush, sodium chloride, ipratropium-albuterol, sodium chloride flush, sodium chloride, perflutren lipid microspheres, heparin (porcine), glucose, dextrose bolus **OR** dextrose bolus, glucagon (rDNA), dextrose    Lab Data:  CBC:   Recent Labs     03/11/23  0459 03/12/23  0450   WBC 12.6* 11.4*   HGB 10.9* 11.6*    290     BMP:    Recent Labs     03/11/23  0459 03/12/23  0450   * 126*   K 5.7* 5.1   CO2 20* 17*   BUN 76* 74*   CREATININE 6.9* 6.9*     ECG: Sinus rhythm; poor RW progression; diffuse TW abn     ECG:  Sinus tachycardia with nonspecific ST-TW changes     Echo: 3/13/23   The left ventricular systolic function is severely reduced with an ejection   fraction of 25-30 %. There is hypokinesis of the inferior, basal inferior, apex, apical anterior   and anterolateral walls. Left ventricular cavity size is mildly dilated with normal left ventricular   wall thickness.    Grade I diastolic dysfunction with normal filling pressure. No changes noted from previous echo on 12-9-2022 in left ventricular   function. Mild mitral and tricuspid regurgitation. Right ventricular systolic function is mildly reduced . Systolic pulmonic artery pressure (SPAP) is estimated at 40 mmHg consistent   with mild pulmonary hypertension (Right atrial pressure of 3 mmHg). 12/2022  TTE  Summary   Limited only f/u for LVEF and pericardial effusion. The left ventricular systolic function is severely reduced with an ejection   fraction of 25- 30%. Changes noted from previous echo on 9- in left ventricular function. There is a trivial to small localized near left and right ventricle   pericardial effusion noted. No tamponade physiology. 5/2022 TTE   Summary   Definity® used for myocardial border enhancement. Left ventricular systolic function is severely reduced with a visually   estimated ejection fraction of 20-25 %. EF estimated by Jasmine's method at 24 %. The left ventricle is mildly dilated in size with normal wall thickness. Severe global hypokinesis with regional variation. Grade I diastolic dysfunction with normal LV pressure. There is no evidence of a left ventricular thrombus. Right ventricular systolic function is reduced. A 29 mm Langford Leyda S3 bioprosthetic aortic valve appears well seated   with normal Doppler values. Inadequate tricuspid valve regurgitation to estimate systolic pulmonary   artery pressure. There is a moderate left pleural effusion noted.         Stress Test:  5/2022          Summary    Severe LV dysfunction EF 26%    Global hypokinesis with regional variation, more pronounced in inferolateral    wall and apex    Large mainly fixed defect in inferior wall, extends to portions of lateral    wall and apex with moderate darrick-infarct ischemia        Overall, this would be considered an abnormal, higher risk, study                CARDIAC CATHETERIZATION REPORT Procedure Date:  2022  Patient Name: Marylin Quevedo  MRN: [id number]      : 1970     FINDINGS      HEMODYNAMICS     CHAMBER PRESSURE SATURATION   RA  10 60%   RV  47/9     PA  50/20  67%   PW  19     AORTA  101/50  95%      NANCY CARDIAC OUTPUT  5.8 L/min   SVR  756    PVR  234       LVGRAM     LVEDP  21   GRADIENT ACROSS AORTIC VALVE  less than 5 mmHg   LV FUNCTION EF 20%   WALL MOTION  severe global hypokinesis with regional variation   MITRAL REGURGITATION  mild         CORONARY ARTERIES     LM Less than 10% oahwspgu-khe-eusnlx stenosis            LAD Moderately calcified, 20 to 30% proximal-mid stenosis, distal vessel tapers at the apex with 60% diffuse disease. D1 has an ostial/proximal 75 to 80% stenosis. LCX  Calcified, proximal-mid 75 to 80% stenosis. Distal vessel is tortuous with 70 to 85% diffuse stenosis with more discrete stenosis noted distally. OM 1 is a very small vessel with ostial/proximal 80% stenosis and 60 to 70% diffuse disease in the cqttkgei-snd-hxsack vessel. Les Pimple RCA Dominant, calcified, tortuous, there is a proximal-mid stent with 60 to 70% in-stent restenosis. Distal vessel 20 to 30% stenosis         CONCLUSIONS:      Mild to moderately increased filling pressures  Patent RCA stent with moderate to borderline severe in-stent restenosis  Severe circumflex and D1 stenosis  Continue medical management, consider outpatient high risk PCI of above territories pending outpatient clinical follow-up. Currently medical management seems best given comorbidities and need for additional fluid removal.  If PCI is pursued, will likely need general anesthesia. Patient had difficulty lying still on Cath Lab table. Okay to resume Eliquis tomorrow, case/plan/findings discussed with patient and wife, they understand/agree, they stated it is incorrectly stated in chart the patient would refuse blood, he is okay to receive blood products if needed.   Continue beta-blocker and Entresto    Telemetry: Sinus rhythm  (Personally reviewed)    Assessment/Plan:    1) Elevated troponin  -2/2/ supply/demand mismatch in setting of known CAD and hypoxia  -continue medical management: plavix, Toprol, statin, isosorbide  -known CAD with prior PCI's  -nothing to suggest angina     2) H/O Atrial fibrillation  -maintaining SR  -continue Eliquis     3) Ischemic cardiomyopathy  -last LVEF 25-30%  -continue Toprol, Entresto  -continue dialysis for volume control (per nephrology)     4) Primary hypertension  -controlled     5) ESRD on HD     6) Acute on chronic respiratory failure  -continue O2 as needed  -continue dialysis     7) Diabetes Mellitus  -Rx per Primary team        Electronically signed by JAY Boone CNP on 3/13/2023 at 1:57 PM

## 2023-03-13 NOTE — PROGRESS NOTES
03/13/23 0020   NIV Type   $NIV $Daily Charge   Skin Protection for O2 Device No  (pt refuses)   Equipment Type v60   Mode Bilevel   Mask Type Full face mask   Mask Size Large   Settings/Measurements   PIP Observed 11 cm H20   IPAP 10 cmH20   CPAP/EPAP 5 cmH2O   Vt (Measured) 651 mL   Rate Ordered 10   Resp 25   FiO2  40 %   I Time/ I Time % 1 s   Minute Volume (L/min) 15 Liters   Mask Leak (lpm) 48 lpm   Comfort Level Good   Using Accessory Muscles No   SpO2 99   Patient's Home Machine No   Alarm Settings   Alarms On Y   Low Pressure (cmH2O) 6 cmH2O   High Pressure (cmH2O) 30 cmH2O   Apnea (secs) 20 secs   RR Low (bpm) 6   RR High (bpm) 40 br/min

## 2023-03-13 NOTE — CONSULTS
Infectious Diseases   Consult Note      Reason for Consult:   positive BC  Requesting Physician:   Dr. Brewer Peak         Date of Admission: 3/10/2023  Subjective:   CHIEF COMPLAINT:   none given       HPI:    Tiffany Kim is a 49yoM with history of ESRD on HD MWF, s/p TAVR, CAD, HTN, DM, COPD, CHF                ED 3/10/23 with SOB and acute respiratory failure   WBC was elevated at 16  CXR with L pleural effusion, vascular congestion, L basilar consolidation. Noted to have a wound on the R foot. Admitted for management of CAP, CHF    One of 2 sets of BC collected on admission is positive for S epi   Hence, ID consult. He has been AF since admission   On 4L NC    Vanc dose ordered on 3/10/23 but appears not to have been given. He says he is feeling generally poorly today. Chronic back pain unchanged. Coughing, not productive. Still feeling SOB.        Current abx:  Azithromycin 500 po daily, s 3/12  Rocephin 1g q24, s 3/12    Cefepime 3/10-3/12       Past Surgical History:       Diagnosis Date    Ambulatory dysfunction     walker for long distances, SOB with distance    Aortic stenosis     echo 2017    Arthritis     hands and hips    Asthma     Bilateral hilar adenopathy syndrome 6/3/2013    CAD (coronary artery disease)     Dr. Willie Schofield [facility]) 04/19/2019    EF= 43%    CHF (congestive heart failure) ([address]     Chronic pain     COPD (chronic obstructive pulmonary disease) ([address]     pulmonology Dr. Nicholas Stone    Depression     Diabetes mellitus ([address]     borderline    Difficult intravenous access     Emphysema of lung ([address]     ESRD (end stage renal disease) on dialysis ([address]     MWF    Fear of needles     Gastric ulcer     GERD (gastroesophageal reflux disease)     Heart valve problem     bicuspic valve    Hemodialysis patient ([address]     History of spinal fracture     work incident    Hx of blood clots     Bilateral lower extremities; stents in place    Hyperlipidemia Hypertension     MI (myocardial infarction) ([facility] 2019    has had 9 MIs. 2019 was the last    Neuromuscular disorder ([address]     due to CVA    Numbness and tingling in left arm     from fistula    Pneumonia     PONV (postoperative nausea and vomiting)     Prolonged emergence from general anesthesia     States requires more medication than most people    Sleep apnea     Uses CPAP    Stroke ([address]     7mm thalamic cva 2017 deficts left side, left side weakness    TIA (transient ischemic attack)     Unspecified diseases of blood and blood-forming organs          Procedure Laterality Date    AORTIC VALVE REPLACEMENT N/A 10/15/2019    TRANSCATHETER AORTIC VALVE REPLACEMENT FEMORAL APPROACH performed by Erica Snow MD at [address] [facility] Right 7/2/2019    PERITONEAL DIALYSIS CATHETER REMOVAL performed by Lanette Santos MD at [address] [facility]  2/29/2015    [facility]    CORONARY ANGIOPLASTY WITH STENT PLACEMENT  05/26/15    CYST REMOVAL  08/14/2013    EXCISION CYSTS, NECK X2 AND ABDOMINAL benign    DIAGNOSTIC CARDIAC CATH LAB PROCEDURE      DIALYSIS FISTULA CREATION Left 10/30/2017    LEFT BRACHIAL CEPHALIC FISTULA    DIALYSIS FISTULA CREATION Left 3/27/2019    LIGATION  AV FISTULA performed by Suzanne Campuzano MD at [address] [facility] [place], DIAGNOSTIC      IR TUNNELED CATHETER PLACEMENT GREATER THAN 5 YEARS  3/21/2022    IR TUNNELED CATHETER PLACEMENT GREATER THAN 5 YEARS 3/21/2022 [facility]    IR TUNNELED CATHETER PLACEMENT GREATER THAN 5 YEARS  4/21/2022    IR TUNNELED CATHETER PLACEMENT GREATER THAN 5 YEARS 4/21/2022 [facility]    IR TUNNELED CATHETER PLACEMENT GREATER THAN 5 YEARS  4/26/2022    IR TUNNELED CATHETER PLACEMENT GREATER THAN 5 YEARS 4/26/2022 [facility]    IR TUNNELED CATHETER PLACEMENT GREATER THAN 5 YEARS  6/23/2022    IR TUNNELED CATHETER PLACEMENT GREATER THAN 5 YEARS 6/23/2022 [facility]    OTHER SURGICAL HISTORY  02/01/2017    laparoscopic cholecystectomy with intraoperative cholangiogram    OTHER SURGICAL HISTORY  2018    PORT PLACEMENT  - vas cath    OTHER SURGICAL HISTORY Bilateral 06/26/2018    laprascopic peritoneal dialysis catheter placement    OTHER SURGICAL HISTORY Right 09/2018    peritoneal dialysis port placed on right side of abdomen    OTHER SURGICAL HISTORY  05/28/2019    PTA/Stenting R External Iliac artery    IA LAPS INSERTION TUNNELED INTRAPERITONEAL CATHETER N/A 9/21/2018    LAPAROSCOPIC PERITONEAL DIALYSIS CATHETER REPLACEMENT performed by Anel Rodgers MD at [address] [facility] 2/24/2022    PERINEAL ABCESS DRAINAGE performed by Anel Rodgers MD at [address] [place] [address] N/A 10/2/2022    THORACENTESIS ULTRASOUND performed by Sumeet Jewell MD at [address] [place] [address]  01/06/2016    UPPER GASTROINTESTINAL ENDOSCOPY  01/29/2017    possible candida, otherwise normal appearing    VASCULAR SURGERY  aprx 2 years ago    2 stents placed, each side of groin       Social History:    TOBACCO:   reports that he quit smoking about 2 years ago. His smoking use included cigarettes. He has a 16.50 pack-year smoking history. He has never used smokeless tobacco.  ETOH:   reports that he does not currently use alcohol. There is no history of illicit drug use or other significant epidemiologic exposures.       Family History:       Problem Relation Age of Onset    Diabetes Mother     Heart Disease Father     Kidney Disease Sister         stage 4-kidney failure    Cancer Sister     Heart Disease Sister     Obesity Sister     Cancer Sister     Heart Disease Sister     Obesity Sister     Alcohol Abuse Brother        Current Medications:    Current Facility-Administered Medications: oxyCODONE (ROXICODONE) immediate release tablet 5 mg, 5 mg, Oral, Q8H PRN **OR** acetaminophen (TYLENOL) tablet 650 mg, 650 mg, Oral, Q4H PRN  [START ON 3/14/2023] DULoxetine (CYMBALTA) extended release capsule 60 mg, 60 mg, Oral, Daily  [START ON 3/14/2023] azithromycin (ZITHROMAX) tablet 500 mg, 500 mg, Oral, Daily  cefTRIAXone (ROCEPHIN) 1,000 mg in sodium chloride 0.9 % 50 mL IVPB (mini-bag), 1,000 mg, IntraVENous, Q24H  sevelamer (RENVELA) tablet 1,600 mg, 1,600 mg, Oral, TID WC  loperamide (IMODIUM) capsule 2 mg, 2 mg, Oral, 4x Daily PRN  sodium chloride flush 0.9 % injection 5-40 mL, 5-40 mL, IntraVENous, 2 times per day  sodium chloride flush 0.9 % injection 5-40 mL, 5-40 mL, IntraVENous, PRN  0.9 % sodium chloride infusion, , IntraVENous, PRN  vancomycin 1500 mg in sodium chloride 0.9% 300 mL IVPB, 15 mg/kg, IntraVENous, Once  sodium chloride flush 0.9 % injection 5-40 mL, 5-40 mL, IntraVENous, 2 times per day  sodium chloride flush 0.9 % injection 5-40 mL, 5-40 mL, IntraVENous, PRN  0.9 % sodium chloride infusion, , IntraVENous, PRN  apixaban (ELIQUIS) tablet 2.5 mg, 2.5 mg, Oral, BID  clopidogrel (PLAVIX) tablet 75 mg, 75 mg, Oral, Daily  ipratropium-albuterol (DUONEB) nebulizer solution 3 mL, 1 vial, Inhalation, Q6H PRN  isosorbide dinitrate (ISORDIL) tablet 20 mg, 20 mg, Oral, TID  metoprolol succinate (TOPROL XL) extended release tablet 100 mg, 100 mg, Oral, BID  pantoprazole (PROTONIX) tablet 40 mg, 40 mg, Oral, QAM AC  pravastatin (PRAVACHOL) tablet 40 mg, 40 mg, Oral, Daily  QUEtiapine (SEROQUEL) tablet 50 mg, 50 mg, Oral, Nightly  lidocaine 1 % injection 5 mL, 5 mL, IntraDERmal, Once  sodium chloride flush 0.9 % injection 5-40 mL, 5-40 mL, IntraVENous, 2 times per day  sodium chloride flush 0.9 % injection 5-40 mL, 5-40 mL, IntraVENous, PRN  0.9 % sodium chloride infusion, 25 mL, IntraVENous, PRN  b complex-C-folic acid (NEPHROCAPS) capsule 1 mg, 1 capsule, Oral, Daily  perflutren lipid microspheres (DEFINITY) injection 1.5 mL, 1.5 mL, IntraVENous, ONCE PRN  sacubitril-valsartan (ENTRESTO) 24-26 MG per tablet 1 tablet, 1 tablet, Oral, BID  nitroGLYCERIN 200 mcg/ml in dextrose 5%, 5-200 mcg/min, IntraVENous, Continuous  heparin (porcine) injection 3,600 Units, 3,600 Units, IntraCATHeter, PRN  glucose chewable tablet 16 g, 4 tablet, Oral, PRN  dextrose bolus 10% 125 mL, 125 mL, IntraVENous, PRN **OR** dextrose bolus 10% 250 mL, 250 mL, IntraVENous, PRN  glucagon (rDNA) injection 1 mg, 1 mg, SubCUTAneous, PRN  dextrose 10 % infusion, , IntraVENous, Continuous PRN  insulin lispro (HUMALOG) injection vial 0-8 Units, 0-8 Units, SubCUTAneous, TID WC  insulin lispro (HUMALOG) injection vial 0-4 Units, 0-4 Units, SubCUTAneous, Nightly      Allergies   Allergen Reactions    Morphine Nausea And Vomiting        REVIEW OF SYSTEMS:    CONSTITUTIONAL:   per HPI   EYES:  negative for blurred vision, eye discharge, visual disturbance and icterus  HEENT:  negative for hearing loss, tinnitus, ear drainage, sinus pressure, nasal congestion, epistaxis and snoring  RESPIRATORY:   No hemoptysis  CARDIOVASCULAR:   per HPI   GASTROINTESTINAL:  negative for nausea, vomiting, diarrhea, constipation, blood in stool and abdominal pain  GENITOURINARY:  negative for frequency, dysuria, urinary incontinence, decreased urine volume, and hematuria  HEMATOLOGIC/LYMPHATIC:  +easy bruising  ALLERGIC/IMMUNOLOGIC:  negative for recurrent infections, angioedema, anaphylaxis and drug reactions  ENDOCRINE:  negative for weight changes and diabetic symptoms including polyuria, polydipsia and polyphagia  MUSCULOSKELETAL:  negative for acute joint swelling, decreased range of motion and muscle weakness  NEUROLOGICAL:  negative for headaches, slurred speech, unilateral weakness  PSYCHIATRIC/BEHAVIORAL: negative for hallucinations, behavioral problems, confusion and agitation.        Objective:   PHYSICAL EXAM:      VITALS:  /65   Pulse 64   Temp 97.6 °F (36.4 °C) (Axillary)   Resp 18   Ht 5' 9\" (1.753 m)   Wt 224 lb 1.6 oz (101.7 kg)   SpO2 99%   BMI 33.09 kg/m²      24HR INTAKE/OUTPUT:    Intake/Output Summary (Last 24 hours) at 3/13/2023 1549  Last data filed at 3/13/2023 6537  Gross per 24 hour   Intake 850 ml   Output 0 ml   Net 850 ml     CONSTITUTIONAL:  Awake, alert, cooperative, no apparent distress, and appears older than stated age  [de-identified]: NCAT, PERRL, EOMI. Sclera white, conjunctiva full. OP with moist mucosal membranes, no thrush, tongue protrudes midline  NECK:  Supple, symmetrical, trachea midline, no adenopathy  LUNGS:  no increased work of breathing   CTA valorie without W/R/R  CARDIOVASCULAR:  RRR with murmur LSB   ABDOMEN:  normal bowel sounds, soft, flat, NT   PSYCHIATRIC: Oriented to person place and time. No obvious depression or anxiety. MUSCULOSKELETAL: No obvious misalignment or effusion of the joints. No clubbing, cyanosis of the digits. SKIN:  normal skin color, texture, turgor and no redness, warmth, or swelling. No palpable nodules or stigmata of embolic phenomenon  NEUROLOGIC: nonfocal exam    ACCESS:  [facility] site ok       DATA:    Old records have been reviewed    CBC:  Recent Labs     03/11/23  0459 03/12/23 0450   WBC 12.6* 11.4*   RBC 3.71* 3.94*   HGB 10.9* 11.6*   HCT 33.8* 36.3*    290   MCV 91.1 92.2   MCH 29.5 29.4   MCHC 32.4 31.9   RDW 16.9* 16.9*      BMP:  Recent Labs     03/11/23  0459 03/12/23  0450   * 126*   K 5.7* 5.1   CL 86* 88*   CO2 20* 17*   BUN 76* 74*   CREATININE 6.9* 6.9*   CALCIUM 9.2 8.8   GLUCOSE 262* 294*        Cultures:   3/10 BC #1 NGTD   BC #2 S epi    Flu, COVID RT PCR neg       Radiology Review:  All pertinent images / reports were reviewed as a part of this visit. CXR 3/10/23  Impression   Left pleural effusion. Mild vascular congestion. Consolidative airspace   disease in the left lung base which could represent infiltrate or mass. Follow up to resolution is suggested.      CXR 3/11/23  Impression   Findings most compatible pulmonary edema, which appears increased when   compared to the previous exam. With that said, pneumonia remains in the   differential.     TTE 3/13/23  Summary   The left ventricular systolic function is severely reduced with an ejection   fraction of 25-30 %. There is hypokinesis of the inferior, basal inferior, apex, apical anterior   and anterolateral walls. Left ventricular cavity size is mildly dilated with normal left ventricular   wall thickness. Grade I diastolic dysfunction with normal filling pressure. No changes noted from previous echo on 12-9-2022 in left ventricular   function. Mild mitral and tricuspid regurgitation. Right ventricular systolic function is mildly reduced . Systolic pulmonic artery pressure (SPAP) is estimated at 40 mmHg consistent   with mild pulmonary hypertension (Right atrial pressure of 3 mmHg).     Assessment:     Patient Active Problem List   Diagnosis    Sepsis without acute organ dysfunction ([facility])    CHF (congestive heart failure) ([facility])    Asthma-COPD overlap syndrome ([facility])    CAD in native artery    PVD (peripheral vascular disease) ([facility]    Bicuspid aortic valve    Bilateral hilar adenopathy syndrome    Claudication in peripheral vascular disease ([facility])    Uncontrolled hypertension    Diabetic neuropathy ([facility])    DM2 (diabetes mellitus, type 2) ([facility])    Passive smoke exposure    Depression with anxiety    PNA (pneumonia)    Obesity (BMI 30-39.9)    ZAINAB on CPAP    Degeneration of lumbar or lumbosacral intervertebral disc    Lumbar radiculopathy    Lumbosacral spondylosis without myelopathy    Biliary colic    Symptomatic cholelithiasis    Gastroparesis due to DM ([facility])    Angina, class IV ([facility])    Dyspnea    Dyslipidemia    Acute on chronic systolic HF (heart failure) ([facility])    Ischemic cardiomyopathy    Tobacco abuse    CVA (cerebral vascular accident) ([facility]    History of CVA (cerebrovascular accident)    Type 2 diabetes mellitus without complication, without long-term current use of insulin ([facility])    ZAINAB (obstructive sleep apnea)    Pleural effusion on left    Chronic anemia    Nonrheumatic aortic valve stenosis    Mucus plugging of bronchi    Hemodialysis-associated hypotension    ESRD (end stage renal disease) ([facility])    Hypotension due to drugs    Acute diastolic CHF (congestive heart failure) ([facility])    Neuromuscular disorder ([facility])    Renovascular hypertension    Mixed hyperlipidemia    Cigarette nicotine dependence in remission    Pulmonary edema    Fluid overload    Anemia of chronic disease    SOB (shortness of breath) on exertion    Steal syndrome of dialysis vascular access ([facility])    Chronic, continuous use of opioids    Chronic bronchitis ([facility])    Nasal congestion    Hypercholesteremia    Bradycardia    History of transcatheter aortic valve replacement (TAVR)    Syncope and collapse    PAF (paroxysmal atrial fibrillation) ([facility])    Bilateral leg weakness    GBS (Guillain-Machias syndrome) ([facility])    Sinus pause    Weakness of both lower extremities    Sinus bradycardia    Ataxia    Peripheral vascular occlusive disease ([facility])    Cellulitis of right foot    Iliac artery occlusion, right ([facility])    Cellulitis and abscess of hand    Uncontrolled type 2 diabetes mellitus with hyperglycemia ([facility])    Acute encephalopathy    Acute hypoxemic respiratory failure ([facility])    Acute CVA (cerebrovascular accident) ([address]    Speech problem    Urinary tract infection with hematuria    Respiratory failure with hypoxia ([address]    Acute respiratory failure with hypercapnia ([facility])    Acute pulmonary edema ([facility])    Grade II diastolic dysfunction    Shock circulatory ([facility])    Smoker    Normocytic normochromic anemia    NSTEMI (non-ST elevated myocardial infarction) ([facility])    SIRS (systemic inflammatory response syndrome) ([facility])    Atrial flutter ([facility])    [place]-rectal abscess    Somnolence    Pulmonary edema with diastolic CHF, NYHA class 3 ([facility])    Respiratory failure ([facility])    Former smoker    Cardiomyopathy ([address]    Morbid obesity with BMI of 40.0-44.9, adult ([address]    Hypoglycemia Altered mental status    Hypertensive emergency    SOB (shortness of breath)    Acute respiratory failure with hypoxia and hypercapnia (HCC)    HFrEF (heart failure with reduced ejection fraction) (HCC)    Pericardial effusion    Hypervolemia    Acute pneumonia    Acute on chronic respiratory failure with hypoxia (HCC)    Compression atelectasis    Type 2 myocardial infarction (HCC)    Acute respiratory failure with hypoxemia (HCC)    Hyperkalemia    Hyponatremia    Metabolic acidemia    Pulmonary infiltrate    Leukocytosis    Hypertensive urgency    Severe sepsis (HCC)    Elevated troponin       Multiple medical problems  ESRD on HD via [facility]   S/p TAVR   CM     Admitted 3/10/23 with acute hypoxemic respiratory failure, leukocytosis   Abnormal CXR with vascular congestion and basilar consolidation, suspected pneumonia     Leukocytosis on admission, resolving     Positive BC, 1/2 sets collected on admission with S epi  Site of collection not indicated   A dose of vanc was ordered on admission but appears not to have been given   Echo was reassuring as far as infection is concerned     Non-healing foot wounds  These wounds do not appear to be infected. He reports history of PTCA to LE  Suspicion of ischemia       -very low suspicion that the isolated +BC with S epi is pathogenic, though certainly at higher risk of infection and complication of S epi bacteremia in the context of TDC and TAVR. -no additional abx coverage needed  -get repeat [facility]   -management of CAP at your discretion   -consider updating DESTINEY     Discussed with patient, questions addressed       Irl Paget, M.D. Thank you for the opportunity to participate in the care of your patient.     Please do not hesitate to contact me:   [phone] office

## 2023-03-13 NOTE — PLAN OF CARE
Problem: Discharge Planning  Goal: Discharge to home or other facility with appropriate resources  3/13/2023 1822 by Augustin Cohen RN  Outcome: Progressing  3/13/2023 0520 by Jhoana Dangelo RN  Outcome: Progressing  Note: May D/C home today after HD. Problem: Chronic Conditions and Co-morbidities  Goal: Patient's chronic conditions and co-morbidity symptoms are monitored and maintained or improved  3/13/2023 1822 by Augustin Cohen RN  Outcome: Progressing  3/13/2023 0520 by Jhoana Dangelo RN  Note: Patient's EF (Ejection Fraction) is less than 40%    Heart Failure Medications:  Diuretics[de-identified] None    (One of the following REQUIRED for EF </= 40%/SYSTOLIC FAILURE but MAY be used in EF% >40%/DIASTOLIC FAILURE)        ACE[de-identified] None        ARB[de-identified] None         ARNI[de-identified] Sacubitril/Valsartan-Entresto    (Beta Blockers)  NON- Evidenced Based Beta Blocker (for EF% >40%/DIASTOLIC FAILURE): None    Evidenced Based Beta Blocker::(REQUIRED for EF% <40%/SYSTOLIC FAILURE) Metoprolol SUCCinate- Toprol XL  . .................................................................................................................................................. Patient's weights and intake/output reviewed: Yes    Patient's Last Weight: 224.1 lbs obtained by standing scale. Difference of 2.7 lbs more than last documented weight. Pt has HD today.       Intake/Output Summary (Last 24 hours) at 3/13/2023 0517  Last data filed at 3/12/2023 2156  Gross per 24 hour   Intake 840 ml   Output 0 ml   Net 840 ml       Comorbidities Reviewed Yes    Patient has a past medical history of Ambulatory dysfunction, Aortic stenosis, Arthritis, Asthma, Bilateral hilar adenopathy syndrome, CAD (coronary artery disease), Cardiomyopathy ([facility] CHF (congestive heart failure) ([facility] Chronic pain, COPD (chronic obstructive pulmonary disease) ([facility] Depression, Diabetes mellitus ([facility] Difficult intravenous access, Emphysema of lung ([facility] ESRD (end stage renal disease) on dialysis [facility]), Fear of needles, Gastric ulcer, GERD (gastroesophageal reflux disease), Heart valve problem, Hemodialysis patient ([facility] History of spinal fracture, Hx of blood clots, Hyperlipidemia, Hypertension, MI (myocardial infarction) ([facility] Neuromuscular disorder ([facility] Numbness and tingling in left arm, Pneumonia, PONV (postoperative nausea and vomiting), Prolonged emergence from general anesthesia, Sleep apnea, Stroke ([facility] TIA (transient ischemic attack), and Unspecified diseases of blood and blood-forming organs. >>For CHF and Comorbidity documentation on Education Time and Topics, please see Education Tab    Progressive Mobility Assessment:  What is this patient's Current Level of Mobility?: Ambulatory-Up Ad Magalis  How was this patient Mobilized today?: Edge of Bed, Up to Chair,  Up to Toilet/Shower, and Up in Room, ambulated 10 ft                 With Whom? Self (refuses to call for ambulation assistance and refuses bed alarm)                 Level of Difficulty/Assistance: Independent     Pt sitting in bed at this time on  4 L O2. Pt with complaints of shortness of breath. Pt with nonpitting lower extremity edema.      Patient and/or Family's stated Goal of Care this Admission: reduce shortness of breath and be more comfortable prior to discharge     Problem: Pain  Goal: Verbalizes/displays adequate comfort level or baseline comfort level  Outcome: Progressing     Problem: Safety - Adult  Goal: Free from fall injury  Outcome: Progressing     Problem: Respiratory - Adult  Goal: Achieves optimal ventilation and oxygenation  Outcome: Progressing     Problem: Cardiovascular - Adult  Goal: Maintains optimal cardiac output and hemodynamic stability  Outcome: Progressing     Problem: Metabolic/Fluid and Electrolytes - Adult  Goal: Glucose maintained within prescribed range  3/13/2023 1822 by Apoorva Levi RN  Outcome: Progressing  3/13/2023 0520 by Jean Marie Cortez RN  Note: Glucose remains poorly controlled. Still checking ACHS.

## 2023-03-13 NOTE — PROGRESS NOTES
Patient and patient's wife well known to palliative care from frequent prior admissions. New palliative consult received over the weekend. Psych consulted for SI since time palliative referral placed.   Monitoring for psychiatry's input before evaluating patient and discussing goals of care

## 2023-03-13 NOTE — PROGRESS NOTES
Progress Note      PCP: Dayami Pastor MD    Date of Admission: 3/10/2023    Chief Complaint: SOB    Hospital Course:     Jade Marsh is a 47-year-old male with past medical history of COPD, systolic heart failure, ESRD on HD, W, F, CAD, s/p TAVR, A-fib on Eliquis, history of CVA who presented to [facility] ED for shortness of breath. Patient arrived via EMS with O2 sats in route between 60s and 70s. Patient was started on BiPAP and given DuoNebs before arrival.  On ED presentation, WBC 16.3, pH 7.32, BNP 70,000, lactate 2.0, troponin 0.34. At this time, patient was meeting sepsis criteria and was found to have possible left-sided pneumonia on CXR. Patient was initially started on IV cefepime and subsequently transitioned to IV ceftriaxone and p.o. azithromycin. Blood culture returned +1 of 2 for Staph epidermidis. Infectious disease was consulted for the validity of this measure. Due to patient's increased BNP as well as vascular congestion and pleural effusion noted on CXR, patient had repeat echo on 3/13. Patient's home Entresto and metoprolol were continued throughout this hospitalization. Patient is not on diuretics chronically. Subjective:      Patient evaluated at bedside this AM.  Patient reported some shortness of breath and chest tightness that was improved from the day prior. Patient was weaned off of BiPAP and transition to 4 L nasal cannula this AM.  Denied chest pain, headache, vision changes, abdominal pain, changes in urination. When I asked the patient if he needed anything else, he replied with \"kill me. \"  Patient would not admit to any active suicidal ideation but did state that if he had the means that he would go through with suicide. Patient also reported 8 out of 10 back pain secondary to laying in the bed.     Medications:  Reviewed    Infusion Medications    sodium chloride      sodium chloride      sodium chloride      nitroGLYCERIN      dextrose       Scheduled Medications cefTRIAXone (ROCEPHIN) IV  1,000 mg IntraVENous Q24H    sevelamer  1,600 mg Oral TID WC    sodium chloride flush  5-40 mL IntraVENous 2 times per day    vancomycin  15 mg/kg IntraVENous Once    sodium chloride flush  5-40 mL IntraVENous 2 times per day    apixaban  2.5 mg Oral BID    clopidogrel  75 mg Oral Daily    DULoxetine  30 mg Oral Daily    isosorbide dinitrate  20 mg Oral TID    metoprolol succinate  100 mg Oral BID    pantoprazole  40 mg Oral QAM AC    pravastatin  40 mg Oral Daily    QUEtiapine  50 mg Oral Nightly    lidocaine 1 % injection  5 mL IntraDERmal Once    sodium chloride flush  5-40 mL IntraVENous 2 times per day    b complex-C-folic acid  1 capsule Oral Daily    sacubitril-valsartan  1 tablet Oral BID    insulin lispro  0-8 Units SubCUTAneous TID WC    insulin lispro  0-4 Units SubCUTAneous Nightly     PRN Meds: oxyCODONE **OR** acetaminophen, loperamide, sodium chloride flush, sodium chloride, sodium chloride flush, sodium chloride, ipratropium-albuterol, sodium chloride flush, sodium chloride, perflutren lipid microspheres, heparin (porcine), glucose, dextrose bolus **OR** dextrose bolus, glucagon (rDNA), dextrose      Intake/Output Summary (Last 24 hours) at 3/13/2023 1155  Last data filed at 3/13/2023 3432  Gross per 24 hour   Intake 850 ml   Output 0 ml   Net 850 ml       Physical Exam Performed:    /68   Pulse 62   Temp 97.6 °F (36.4 °C) (Axillary)   Resp 18   Ht 5' 9\" (1.753 m)   Wt 224 lb 1.6 oz (101.7 kg)   SpO2 100%   BMI 33.09 kg/m²     General appearance: No apparent distress, appears stated age and cooperative. HEENT: Pupils equal, round, and reactive to light. Conjunctivae/corneas clear. Neck: Supple, with full range of motion. No jugular venous distention. Trachea midline. Respiratory:  Normal respiratory effort. Diminished breath sounds on left side. Cardiovascular: Regular rate and rhythm with normal S1/S2 without murmurs, rubs or gallops.   Abdomen: Soft, non-tender, non-distended with normal bowel sounds. Musculoskeletal: No clubbing, cyanosis or edema bilaterally. Full range of motion without deformity. Skin: Skin color, texture, turgor normal.  No rashes or lesions. Neurologic:  Neurovascularly intact without any focal sensory/motor deficits. Cranial nerves: II-XII intact, grossly non-focal.  Psychiatric: Alert and oriented, thought content appropriate, normal insight  Capillary Refill: Brisk,3 seconds, normal   Peripheral Pulses: +2 palpable, equal bilaterally       Labs:   Recent Labs     03/10/23  1142 03/11/23 0459 03/12/23 0450   WBC 16.3* 12.6* 11.4*   HGB 11.3* 10.9* 11.6*   HCT 35.0* 33.8* 36.3*    323 290     Recent Labs     03/11/23 0459 03/12/23 0450   * 126*   K 5.7* 5.1   CL 86* 88*   CO2 20* 17*   BUN 76* 74*   CREATININE 6.9* 6.9*   CALCIUM 9.2 8.8   PHOS 8.3* 9.7*     No results for input(s): AST, ALT, BILIDIR, BILITOT, ALKPHOS in the last 72 hours. No results for input(s): INR in the last 72 hours. Recent Labs     03/11/23 0459   TROPONINI 0.27*       Urinalysis:      Lab Results   Component Value Date/Time    NITRU Negative 12/25/2022 05:00 AM    WBCUA 6-9 12/25/2022 05:00 AM    BACTERIA Rare 12/25/2022 05:00 AM    RBCUA 3-4 12/25/2022 05:00 AM    BLOODU SMALL 12/25/2022 05:00 AM    SPECGRAV 1.020 12/25/2022 05:00 AM    GLUCOSEU 500 12/25/2022 05:00 AM       Radiology:  XR CHEST (2 VW)   Final Result   Findings most compatible pulmonary edema, which appears increased when   compared to the previous exam.  With that said, pneumonia remains in the   differential.         XR CHEST PORTABLE   Preliminary Result   Left pleural effusion. Mild vascular congestion. Consolidative airspace   disease in the left lung base which could represent infiltrate or mass. Follow up to resolution is suggested.                  Assessment/Plan:    Active Hospital Problems    Diagnosis     Acute on chronic systolic HF (heart failure) ([facility] [I50.23]      Priority: High    CAD in native artery [I25.10]      Priority: High    Elevated troponin [R77.8]      Priority: Medium    Severe sepsis ([address] [A41.9, R65.20]      Priority: Medium     Sepsis secondary to LLL pneumonia  - SIRS criteria met on admission: WBC 16.3, lactate 2.0, tachycardic 113, RR 24  - Clinical sepsis resolved  - On ceftriaxone IV and po azithromycin  - Initial BC positive 1/2 with staph epidermidis, possible contamination  - ID consulted  - Repeat BC 3/13  - WBC 11.4 on 3/12  - Afebrile throughout hospitalization    Acute hypoxic respiratory failure  - Baseline 3-3.5 L NC  - O2 Sat 60s-70s per EMS, intially on Bipap  - pH 7.32 on admission  - Weaned to 4L NC on 3/13 AM  - Will continue to titrate to baseline    Chronic systolic heart failure  - BNP: >70,000 on admission (this appears to be consistent with prior admissions per chart review)  - Repeat ECHO 3/13: results pending  - Daily weights (baseline 102 kg?)  - Strict I/Os  - Heart failure RN consulted  - Continue Entresto and metoprolol    ESRD on HD M, W, F  - Cr 6.9 on 3/13  - Plan for dialysis today  - Per patient, has been on dialysis for ~3 years    Elevated troponin  - Troponin 0.34 on admission  - EKG without concern for ischemia  - Cardiology evaluated, likely secondary to supply/demand mismatch    Suicidal ideation  - Noted on 3/13 AM with bedside conversation   - Psych consulted  - Palliative care consulted for [facility]    T2DM  - POC -242 over past 2 days  - Continue medium dose SSI and corrective bedtime algorithm     A fib  - HR: 64  - Continue Eliquis, rate control with metoprolol    CAD, s/p TAVR  - Continue pravastatin, plavix    DVT Prophylaxis: Eliquis  Diet: ADULT DIET; Regular; 3 carb choices (45 gm/meal); Low Sodium (2 gm); Low Potassium (Less than 3000 mg/day);  Low Phosphorus (Less than 1000 mg); 1000 ml  Code Status: Full Code    PT/OT Eval Status: not indicated    Dispo - pending ID evaluation, psych    Elsy Orr Ney Ortiz MD

## 2023-03-13 NOTE — PROGRESS NOTES
Department of Internal Medicine  Nephrology Progress Note        SUBJECTIVE:    We are following this patient for ESRD management. The patient was seen and examined  Resting  Awaiting HD on 3/13      ROS: No fever or chills. Social: No family at bedside. Physical Exam:    VITALS:  /65   Pulse 64   Temp 97.6 °F (36.4 °C) (Axillary)   Resp 18   Ht 5' 9\" (1.753 m)   Wt 224 lb 1.6 oz (101.7 kg)   SpO2 99%   BMI 33.09 kg/m²     General appearance: Seems comfortable, no acute distress. Neck: Trachea midline, thyroid normal.   Lungs:  Non labored breathing, CTA to anterior auscultation. Heart:  S1S2 normal, rub or gallop. No peripheral edema. Abdomen: Soft, non-tender, no organomegaly. Skin: No lesions or rashes, warm to touch.      DATA:    CBC with Differential:    Lab Results   Component Value Date/Time    WBC 11.4 03/12/2023 04:50 AM    RBC 3.94 03/12/2023 04:50 AM    HGB 11.6 03/12/2023 04:50 AM    HCT 36.3 03/12/2023 04:50 AM     03/12/2023 04:50 AM    MCV 92.2 03/12/2023 04:50 AM    MCH 29.4 03/12/2023 04:50 AM    MCHC 31.9 03/12/2023 04:50 AM    RDW 16.9 03/12/2023 04:50 AM    SEGSPCT 73.4 05/17/2013 06:50 AM    BANDSPCT 1 12/14/2022 05:36 AM    METASPCT 2 03/03/2022 07:41 AM    LYMPHOPCT 11.7 03/12/2023 04:50 AM    MONOPCT 7.9 03/12/2023 04:50 AM    MYELOPCT 1 03/03/2022 07:41 AM    BASOPCT 0.8 03/12/2023 04:50 AM    MONOSABS 0.9 03/12/2023 04:50 AM    LYMPHSABS 1.3 03/12/2023 04:50 AM    EOSABS 0.3 03/12/2023 04:50 AM    BASOSABS 0.1 03/12/2023 04:50 AM    DIFFTYPE Auto 05/17/2013 06:50 AM     BMP:    Lab Results   Component Value Date/Time     03/12/2023 04:50 AM    K 5.1 03/12/2023 04:50 AM    K 5.4 03/10/2023 09:20 AM    CL 88 03/12/2023 04:50 AM    CO2 17 03/12/2023 04:50 AM    BUN 74 03/12/2023 04:50 AM    LABALBU 3.9 03/12/2023 04:50 AM    CREATININE 6.9 03/12/2023 04:50 AM    CALCIUM 8.8 03/12/2023 04:50 AM    GFRAA 10 10/05/2022 11:53 AM    GFRAA >60 05/17/2013 06:50 AM    LABGLOM 9 03/12/2023 04:50 AM    GLUCOSE 294 03/12/2023 04:50 AM       IMPRESSION/RECOMMENDATIONS:      1- ESRD: We will continue hemodialysis per MWF schedule.   HD on 3/13 w 3-3.5l UFG  2- Hyponatremia: Apply fluid restriction and attempt further UF.  3- Hyperkalemia: Mild, will apply low potassium diet.  4- HTN: Blood pressure within acceptable range.  5- Anemia: Hemoglobin above target for ESRD, hold DAVON and monitor.  6- Shortness of breath: Improving with further Ultrafiltration during dialysis.  7- staph epi bacteremia ( 1/2 sets)    Will follow ID recs

## 2023-03-13 NOTE — CONSULTS
3/13/23 @ 1144 notified Inf Dis of consult via perfect serve.  [facility]

## 2023-03-13 NOTE — PROGRESS NOTES
03/13/23 0328   NIV Type   Skin Protection for O2 Device No  (pt refuses)   Equipment Type v60   Mode Bilevel   Mask Type Full face mask   Mask Size Large   Settings/Measurements   PIP Observed 10 cm H20   IPAP 10 cmH20   CPAP/EPAP 5 cmH2O   Vt (Measured) 469 mL   Rate Ordered 10   Resp 24   FiO2  40 %   I Time/ I Time % 1 s   Minute Volume (L/min) 11.3 Liters   Mask Leak (lpm) 50 lpm   Comfort Level Good   Using Accessory Muscles No   SpO2 98   Patient's Home Machine No   Alarm Settings   Alarms On Y   Low Pressure (cmH2O) 6 cmH2O   High Pressure (cmH2O) 30 cmH2O   Apnea (secs) 20 secs   RR Low (bpm) 6   RR High (bpm) 40 br/min

## 2023-03-13 NOTE — CONSULTS
PALLIATIVE MEDICINE CONSULTATION     Patient name:Igor Meneses   IPK:[id number]    :1970  Room/Bed:0435/0435-01   LOS: 3 days         Date of consult:3/13/2023    Consult Information  Palliative Medicine Consult performed by: JAY Hunt CNP, CNP    Inpatient consult to Palliative Care  Consult performed by: JAY Castaneda CNP  Consult ordered by: Ethel Ledesma DO             ASSESSMENT/RECOMMENDATIONS     45 y.o. male with CHF (25-30%), ESRD, COPD and frequent admissions       Symptom Management:  Goals of Care- Patient states he wants to remain aggressive with his medical care at this time. He wants to continue with dialysis and consider any future medical interventions on a case-by-case basis. He refuses to go anywhere/any place to work with therapy. He has amended his code status today to Limited x 4 Nos. Palliative care will continue to follow    Shortness of breath -  due to pneumonia/hypervolemia. CXR with left pleural effusion. Baseline COPD with 3.5 L baseline home oxygen requirement and CHF (last echo EF 25-30%. Repeat echo pending). Was requiring bipap, now weaned to 4 L. Abx for pneumonia. iHD for volume control. Says he is feeling better today    ESRD - on dialysis. Has been on dialysis for several years (MWF). Takes himself to dialysis, has been known to be noncompliant with diet and some dialysis runs. Serum creatinine today 6.9. Wants to continue with dialysis for now to satisfy his wife's wishes to remain aggressive with care and be alive. Understands that if he would change goals to comfort care, dialysis would eventually be stopped and survival would not be expected beyond a 1-2 weeks. Patient/Family Goals of Care :    3/3/22    Spoke with patient at the bedside. Patient is alert and oriented and decisional.  Patient agreeable to discussion with me today. Patient states he is starting to feel a little better.   Told me about talking with psych today, confirms no actual plan to kill himself or others, but doesn't mean he doesn't wish he or other people weren't dead. He states he would have stopped aggressive medical care a long time ago if it wasn't for his wife. She makes him get all of this medical care that he doesn't really want. She has been disabled even longer than he has, and he thinks he has spoiled her and she needs him for everything now. We discussed the cardiac, kidney and pain issues. He was fired from his prior PCP for noncompliance, which he disagrees with. He states he does not have a current doctor. He really thinks he should be back on his percocet. He says he knows his heart his \"bad\". He says he knows he is \"a high risk patient\" and \"no doc wants to touch me because I'll die\". We talked about cardiology's note that he would be treated conservatively at this time given his high-risk status. Patient states he would be much happier without his wife, but he can't afford a divorce and she needs him. There is no other place he can go to live. We talked about going to a nursing home and he refused. He said \"I don't get along well with others\". He told me of previous SNF stays where he tried to refuse therapy or interactions with others, and these scenarios became problematic for him later. He says there is nowhere else he can go and he will have to continue living with his wife. We discussed code status, especially given that he says he is personally not interested in aggressive care. Patient states that if there is even a 10% chance he would have no quality of life after a resuscitation attempt, then he doesn't want it at all.   I explained that there is no way for use to know how a resuscitation attempt is going to play out when its started, but statistically speaking, given his severe co-morbidities there is greater than a 10% chance he would not have a meaningful life (to him - fully independent, able to interact with his grand kids) following a resuscitation attempt, or even a darrick-arrest intubation. Patient states in that case he does not want to be intubated or resuscitated. I explained to him that we would call this a Limited code status - no intubation, no chest compressions, no defibrillation (does not have ICD), and no ACLS medications intended to restart the heart. He is in agreement with this. He wishes to continue with dialysis at this time as well as potential future procedures (he wants to determine any procedures on a case by case basis depending on risk). He is agreeable to future follow up if needed. Disposition/Discharge Plan:   - pending medical course  - An outpatient [facility] referral can be ordered for post-discharge follow up when patient returns home if medically appropriate (palliative care will follow for this). Advance Directives: The patient has the following advanced directives on file:  [address] [facility] [place] [place] ACP-Advance Directive ACP-Power of     Not on File [place] on 10/24/19 [place]            The patient has appointed the following active healthcare agents:    Primary Decision Maker: Crystal Ann - Spouse - [phone]    Secondary Decision Maker: dian polk - Step Child - [phone]    The Patient has the following current code status:    Code Status: Limited x 4 Nos      Case discussed with: patient, floor RN, attending MD (resident), nephrology  Thank you for allowing us to participate in the care of this patient. HISTORY     CC: shortness of breath  HPI: The patient is a 45 y.o. male with PMH of ESRD on hemodialysis MWF, chronic systolic CHF, s/p TAVR, CAD, HTN, DM2, asthma-COPD overlap presented to the ED with complaints of shortness of breath. He reports for the past few nights he has been waking up unable to breath. Cannot lie flat. Does have cough and clear sputum production. Reports feeling warm at night but no recorded fevers. Due to worsening SOB he called EMS. En Route he had some chest discomfort that is no longer present. In ED he was placed on BiPAP and given duoneb and solumedrol in route. Still short of breath when tried to be placed on 5L NC. Palliative Medicine SymptomScreening/ROS:    Review of Systems   Constitutional:  Positive for fatigue. Negative for activity change, appetite change and unexpected weight change. HENT:  Negative for trouble swallowing. Respiratory:  Positive for cough, shortness of breath and wheezing. Negative for choking and stridor. Cardiovascular: Negative. Gastrointestinal: Negative. Genitourinary: Negative. Musculoskeletal:  Positive for back pain. Neurological: Negative. Psychiatric/Behavioral: Negative. All other systems reviewed and are negative. A complete 10 count ROS was obtained. Pertinent positives mentioned above in HPI/ROS. All others if not mentioned are negative. Palliative Performance Scale:     [] 60%  Amb reduced; Sig dz. Can't do hobbies/housework; Intake normal or reduced, Occasional assist; LOC full/confusion   [x] 50%  Mainly sit/lie; Extensive disease. Mainly assist, Intake normal or reduced; Occasional assist; LOC full/confusion   [] 40%  Mainly in bed; Extensive disease; Mainly assist; Intake normal or reduced; Occasional assist; LOC full/confusion   [] 30%  Bed bound, Extensive disease; Total care; Intake reduced; LOC full/confusion   [] 20%  Bed bound; Extensive disease; Total care; Intake minimal; Drowsy/coma   [] 10%  Bed bound; Extensive disease; Total care; Mouth care only; Drowsy/coma   []  0%   Death       Home med list and hospital medications reviewed in chart as of 3/13/2023     EXAM     Vitals:    03/13/23 1227   BP: 124/65   Pulse: 64   Resp: 18   Temp:    SpO2: 99%       Physical Exam  Vitals reviewed. Constitutional:       General: He is not in acute distress. Appearance: He is obese. He is ill-appearing. Comments: Sitting on he side of bed   HENT:      Head: Normocephalic and atraumatic. Nose: Nose normal.      Mouth/Throat:      Mouth: Mucous membranes are moist.      Pharynx: Oropharynx is clear. Eyes:      General: No scleral icterus. Right eye: No discharge. Left eye: No discharge. Extraocular Movements: Extraocular movements intact. Conjunctiva/sclera: Conjunctivae normal.   Cardiovascular:      Rate and Rhythm: Normal rate. Pulses: Normal pulses. Pulmonary:      Effort: Pulmonary effort is normal. No respiratory distress. Breath sounds: No stridor. No wheezing. Comments: 4L NC  Abdominal:      Palpations: Abdomen is soft. Musculoskeletal:         General: Normal range of motion. Cervical back: Normal range of motion and neck supple. Skin:     General: Skin is warm and dry. Capillary Refill: Capillary refill takes less than 2 seconds. Neurological:      Mental Status: He is alert and oriented to person, place, and time. Psychiatric:         Mood and Affect: Mood normal.         Behavior: Behavior normal.         Thought Content: Thought content normal.         Judgment: Judgment normal.              OBJECTIVE   /65   Pulse 64   Temp 97.6 °F (36.4 °C) (Axillary)   Resp 18   Ht 5' 9\" (1.753 m)   Wt 224 lb 1.6 oz (101.7 kg)   SpO2 99%   BMI 33.09 kg/m²   I/O last 3 completed shifts:   In: 36 [P.O.:1320]  Out: 0   I/O this shift:  In: 10 [I.V.:10]  Out: -       Palliative Medicine Interventions:    Patient/family support  Basic disease process education and treatment options education  Medical status updates and questions answered   Goals of Care discussions with patient/surrogate  Spiritual Interventions:  offered             DATA:  Current labs in the epic chart reviewed as of 3/13/2023   Review of previous notes, admits, labs, radiology and testing relevant to this consult done in this chart today 3/13/2023    Data Reviewed related to this consultation:    Review of prior external note(s) from each unique source relevant to today's visit: Hospitalist, Case management, Nursing, PT/OT/RT, Cardiology, Nephrology, psychiatry   Discussion of management or test with external physician/qualified health care professional: Hospitalist, Case management, Nursing,    Unique test results reviewed: CBC and BMP, GI-Liver profile, Cardiac labs, CXR, Echo       I have spent a total of 95 minutes on: Performing a medical appropriate examination and/or evaluation    Counseling and educating the patient/family/caregiver  Preparing to see the patient (e.g., review of tests)  Referring / communicating with other healthcare professionals including care coordination (not separately reported):  Hospitalist, Case management  Documenting clinical information in the electronic health record              Signed By: Electronically signed by JAY Price CNP on 3/13/2023 at 4:21 PM  Palliative Medicine   [phone]    March 13, 2023

## 2023-03-13 NOTE — FLOWSHEET NOTE
03/12/23 2316   Vital Signs   Temp 97.5 °F (36.4 °C)   Temp Source Oral   Heart Rate 66   Heart Rate Source Monitor   Resp 22   BP (!) 147/85   MAP (Calculated) 106   BP Location Right upper arm   Level of Consciousness 0   MEWS Score 2   Oxygen Therapy   SpO2 100 %   O2 Device PAP (positive airway pressure)   FiO2  40 %     Pt remains A&O, VSS, sinus rhythm on tele. Pt states he went to restroom for BM and there was some blood on his wipe and in water. CBC ordered for AM, today's Hgb 11+, pt denies prior hx of hemorrhoids. Toilet was flushed so writer unable to visualize. Imodium administered per pt request, states he's had four stools today. Barrier cream wipes provided for complaint of perianal burning/skin irritation R/T frequent BMs and pt instructed all wipes must be placed in trash.

## 2023-03-13 NOTE — PROGRESS NOTES
Pt refused AM labs, phlebotomist states she will have someone come try again later. He has HD today so they can pull blood work if he remains resistant.

## 2023-03-13 NOTE — PROGRESS NOTES
03/12/23 2156   NIV Type   Skin Assessment Clean, dry, & intact   Skin Protection for O2 Device No  (pt refusing)   NIV Started/Stopped On   Equipment Type v60   Mode Bilevel   Mask Type Full face mask   Mask Size Large   Settings/Measurements   PIP Observed 9 cm H20   IPAP 10 cmH20   CPAP/EPAP 5 cmH2O   Vt (Measured) 694 mL   Rate Ordered 10   Resp 18   FiO2  40 %   I Time/ I Time % 1 s   Minute Volume (L/min) 13.7 Liters   Mask Leak (lpm) 50 lpm   Comfort Level Good   Using Accessory Muscles No   SpO2 98   Alarm Settings   Alarms On Y   Low Pressure (cmH2O) 6 cmH2O   High Pressure (cmH2O) 30 cmH2O   Apnea (secs) 20 secs   RR Low (bpm) 6   RR High (bpm) 40 br/min   Breath Sounds   Right Upper Lobe Diminished   Right Middle Lobe Diminished   Right Lower Lobe Diminished   Left Upper Lobe Diminished   Left Lower Lobe Diminished

## 2023-03-13 NOTE — CONSULTS
Nutrition Education    Educated on Increasing protein, patient reports understanding low salt guidelines for heart failure and kidneys. Patient requested Glucerna shakes. RD modified diet regarding sodium content and carb allowance. Patient reported not having many options on menu. Learners: Patient  Readiness: Acceptance  Method: Verbal  Response: Verbalizes Understanding  Contact name and number provided.     Alexa Bedolla LD  Contact Number: [phone]

## 2023-03-14 ENCOUNTER — APPOINTMENT (OUTPATIENT)
Dept: GENERAL RADIOLOGY | Age: 53
DRG: 871 | End: 2023-03-14
Payer: MEDICARE

## 2023-03-14 LAB
ALBUMIN SERPL-MCNC: 4 G/DL (ref 3.4–5)
ANION GAP SERPL CALCULATED.3IONS-SCNC: 20 MMOL/L (ref 3–16)
BACTERIA BLD CULT: NORMAL
BASOPHILS ABSOLUTE: 0.1 K/UL (ref 0–0.2)
BASOPHILS RELATIVE PERCENT: 0.7 %
BUN BLDV-MCNC: 65 MG/DL (ref 7–20)
CALCIUM SERPL-MCNC: 8.6 MG/DL (ref 8.3–10.6)
CHLORIDE BLD-SCNC: 90 MMOL/L (ref 99–110)
CO2: 20 MMOL/L (ref 21–32)
CREAT SERPL-MCNC: 6.4 MG/DL (ref 0.9–1.3)
EOSINOPHILS ABSOLUTE: 0.3 K/UL (ref 0–0.6)
EOSINOPHILS RELATIVE PERCENT: 2.3 %
GFR SERPL CREATININE-BSD FRML MDRD: 10 ML/MIN/{1.73_M2}
GLUCOSE BLD-MCNC: 179 MG/DL (ref 70–99)
GLUCOSE BLD-MCNC: 186 MG/DL (ref 70–99)
GLUCOSE BLD-MCNC: 229 MG/DL (ref 70–99)
GLUCOSE BLD-MCNC: 236 MG/DL (ref 70–99)
GLUCOSE BLD-MCNC: 257 MG/DL (ref 70–99)
HCT VFR BLD CALC: 36.2 % (ref 40.5–52.5)
HEMOGLOBIN: 11.7 G/DL (ref 13.5–17.5)
LYMPHOCYTES ABSOLUTE: 1.2 K/UL (ref 1–5.1)
LYMPHOCYTES RELATIVE PERCENT: 10 %
MCH RBC QN AUTO: 29.3 PG (ref 26–34)
MCHC RBC AUTO-ENTMCNC: 32.2 G/DL (ref 31–36)
MCV RBC AUTO: 90.9 FL (ref 80–100)
MONOCYTES ABSOLUTE: 0.8 K/UL (ref 0–1.3)
MONOCYTES RELATIVE PERCENT: 6.8 %
NEUTROPHILS ABSOLUTE: 9.3 K/UL (ref 1.7–7.7)
NEUTROPHILS RELATIVE PERCENT: 80.2 %
PDW BLD-RTO: 17.1 % (ref 12.4–15.4)
PERFORMED ON: ABNORMAL
PHOSPHORUS: 7.1 MG/DL (ref 2.5–4.9)
PLATELET # BLD: 258 K/UL (ref 135–450)
PMV BLD AUTO: 7.6 FL (ref 5–10.5)
POTASSIUM SERPL-SCNC: 4.3 MMOL/L (ref 3.5–5.1)
RBC # BLD: 3.99 M/UL (ref 4.2–5.9)
SODIUM BLD-SCNC: 130 MMOL/L (ref 136–145)
WBC # BLD: 11.6 K/UL (ref 4–11)

## 2023-03-14 PROCEDURE — 2580000003 HC RX 258: Performed by: INTERNAL MEDICINE

## 2023-03-14 PROCEDURE — 6360000002 HC RX W HCPCS: Performed by: INTERNAL MEDICINE

## 2023-03-14 PROCEDURE — 6370000000 HC RX 637 (ALT 250 FOR IP): Performed by: INTERNAL MEDICINE

## 2023-03-14 PROCEDURE — 6370000000 HC RX 637 (ALT 250 FOR IP)

## 2023-03-14 PROCEDURE — 71045 X-RAY EXAM CHEST 1 VIEW: CPT

## 2023-03-14 PROCEDURE — 80069 RENAL FUNCTION PANEL: CPT

## 2023-03-14 PROCEDURE — 2580000003 HC RX 258: Performed by: EMERGENCY MEDICINE

## 2023-03-14 PROCEDURE — 87040 BLOOD CULTURE FOR BACTERIA: CPT

## 2023-03-14 PROCEDURE — 6370000000 HC RX 637 (ALT 250 FOR IP): Performed by: NURSE PRACTITIONER

## 2023-03-14 PROCEDURE — 99232 SBSQ HOSP IP/OBS MODERATE 35: CPT | Performed by: NURSE PRACTITIONER

## 2023-03-14 PROCEDURE — 2060000000 HC ICU INTERMEDIATE R&B

## 2023-03-14 PROCEDURE — 85025 COMPLETE CBC W/AUTO DIFF WBC: CPT

## 2023-03-14 PROCEDURE — 36415 COLL VENOUS BLD VENIPUNCTURE: CPT

## 2023-03-14 PROCEDURE — 94660 CPAP INITIATION&MGMT: CPT

## 2023-03-14 RX ORDER — INSULIN LISPRO 100 [IU]/ML
0-16 INJECTION, SOLUTION INTRAVENOUS; SUBCUTANEOUS
Status: DISCONTINUED | OUTPATIENT
Start: 2023-03-14 | End: 2023-03-16 | Stop reason: HOSPADM

## 2023-03-14 RX ORDER — INSULIN GLARGINE 100 [IU]/ML
5 INJECTION, SOLUTION SUBCUTANEOUS NIGHTLY
Status: DISCONTINUED | OUTPATIENT
Start: 2023-03-14 | End: 2023-03-16 | Stop reason: HOSPADM

## 2023-03-14 RX ORDER — INSULIN LISPRO 100 [IU]/ML
0-4 INJECTION, SOLUTION INTRAVENOUS; SUBCUTANEOUS NIGHTLY
Status: DISCONTINUED | OUTPATIENT
Start: 2023-03-14 | End: 2023-03-14

## 2023-03-14 RX ORDER — ONDANSETRON 4 MG/1
4 TABLET, ORALLY DISINTEGRATING ORAL EVERY 8 HOURS PRN
Status: DISCONTINUED | OUTPATIENT
Start: 2023-03-14 | End: 2023-03-16 | Stop reason: HOSPADM

## 2023-03-14 RX ADMIN — AZITHROMYCIN MONOHYDRATE 500 MG: 250 TABLET ORAL at 09:27

## 2023-03-14 RX ADMIN — CLOPIDOGREL BISULFATE 75 MG: 75 TABLET ORAL at 09:25

## 2023-03-14 RX ADMIN — APIXABAN 2.5 MG: 5 TABLET, FILM COATED ORAL at 20:44

## 2023-03-14 RX ADMIN — OXYCODONE HYDROCHLORIDE 5 MG: 5 TABLET ORAL at 20:44

## 2023-03-14 RX ADMIN — NEPHROCAP 1 MG: 1 CAP ORAL at 09:26

## 2023-03-14 RX ADMIN — METOPROLOL SUCCINATE 100 MG: 50 TABLET, EXTENDED RELEASE ORAL at 09:25

## 2023-03-14 RX ADMIN — SODIUM CHLORIDE, PRESERVATIVE FREE 10 ML: 5 INJECTION INTRAVENOUS at 21:00

## 2023-03-14 RX ADMIN — Medication 10 ML: at 20:45

## 2023-03-14 RX ADMIN — ISOSORBIDE DINITRATE 20 MG: 20 TABLET ORAL at 09:26

## 2023-03-14 RX ADMIN — METOPROLOL SUCCINATE 100 MG: 50 TABLET, EXTENDED RELEASE ORAL at 20:43

## 2023-03-14 RX ADMIN — CEFTRIAXONE SODIUM 1000 MG: 1 INJECTION, POWDER, FOR SOLUTION INTRAMUSCULAR; INTRAVENOUS at 09:28

## 2023-03-14 RX ADMIN — SACUBITRIL AND VALSARTAN 1 TABLET: 24; 26 TABLET, FILM COATED ORAL at 09:25

## 2023-03-14 RX ADMIN — INSULIN LISPRO 4 UNITS: 100 INJECTION, SOLUTION INTRAVENOUS; SUBCUTANEOUS at 16:49

## 2023-03-14 RX ADMIN — SODIUM CHLORIDE, PRESERVATIVE FREE 10 ML: 5 INJECTION INTRAVENOUS at 04:22

## 2023-03-14 RX ADMIN — QUETIAPINE FUMARATE 50 MG: 25 TABLET ORAL at 20:43

## 2023-03-14 RX ADMIN — OXYCODONE HYDROCHLORIDE 5 MG: 5 TABLET ORAL at 00:12

## 2023-03-14 RX ADMIN — SODIUM CHLORIDE, PRESERVATIVE FREE 10 ML: 5 INJECTION INTRAVENOUS at 04:21

## 2023-03-14 RX ADMIN — DULOXETINE HYDROCHLORIDE 60 MG: 60 CAPSULE, DELAYED RELEASE ORAL at 09:25

## 2023-03-14 RX ADMIN — PRAVASTATIN SODIUM 40 MG: 40 TABLET ORAL at 09:25

## 2023-03-14 RX ADMIN — ISOSORBIDE DINITRATE 20 MG: 20 TABLET ORAL at 12:46

## 2023-03-14 RX ADMIN — INSULIN LISPRO 4 UNITS: 100 INJECTION, SOLUTION INTRAVENOUS; SUBCUTANEOUS at 09:40

## 2023-03-14 RX ADMIN — SEVELAMER CARBONATE 1600 MG: 800 TABLET, FILM COATED ORAL at 12:46

## 2023-03-14 RX ADMIN — INSULIN GLARGINE 5 UNITS: 100 INJECTION, SOLUTION SUBCUTANEOUS at 20:44

## 2023-03-14 RX ADMIN — SEVELAMER CARBONATE 1600 MG: 800 TABLET, FILM COATED ORAL at 09:26

## 2023-03-14 RX ADMIN — SACUBITRIL AND VALSARTAN 1 TABLET: 24; 26 TABLET, FILM COATED ORAL at 20:44

## 2023-03-14 RX ADMIN — ISOSORBIDE DINITRATE 20 MG: 20 TABLET ORAL at 20:44

## 2023-03-14 RX ADMIN — APIXABAN 2.5 MG: 5 TABLET, FILM COATED ORAL at 09:26

## 2023-03-14 RX ADMIN — Medication 10 ML: at 09:28

## 2023-03-14 RX ADMIN — SEVELAMER CARBONATE 1600 MG: 800 TABLET, FILM COATED ORAL at 16:49

## 2023-03-14 RX ADMIN — PANTOPRAZOLE SODIUM 40 MG: 40 TABLET, DELAYED RELEASE ORAL at 05:41

## 2023-03-14 RX ADMIN — ONDANSETRON 4 MG: 4 TABLET, ORALLY DISINTEGRATING ORAL at 09:47

## 2023-03-14 ASSESSMENT — PAIN DESCRIPTION - ORIENTATION: ORIENTATION: MID

## 2023-03-14 ASSESSMENT — PAIN SCALES - GENERAL
PAINLEVEL_OUTOF10: 7
PAINLEVEL_OUTOF10: 8

## 2023-03-14 ASSESSMENT — PAIN DESCRIPTION - LOCATION
LOCATION: BACK
LOCATION: BACK;HIP

## 2023-03-14 NOTE — PROGRESS NOTES
03/14/23 0326   NIV Type   Skin Protection for O2 Device No  (pt refused)   Equipment Type v60   Mode Bilevel   Mask Type Full face mask   Mask Size Large   Settings/Measurements   PIP Observed 11 cm H20   IPAP 10 cmH20   CPAP/EPAP 5 cmH2O   Vt (Measured) 718 mL   Rate Ordered 10   Resp 16   FiO2  35 %   I Time/ I Time % 1 s   Minute Volume (L/min) 11.8 Liters   Comfort Level Good   Using Accessory Muscles No   SpO2 99   Patient's Home Machine No   Alarm Settings   Alarms On Y   Low Pressure (cmH2O) 6 cmH2O   High Pressure (cmH2O) 30 cmH2O   Apnea (secs) 20 secs   RR Low (bpm) 6   RR High (bpm) 40 br/min

## 2023-03-14 NOTE — DIALYSIS
Pt request to run tx at 2:40hours. Pt tolerated tx well w/ no concerns voiced., 2.6L removed. Pt dc'd from unit in stable condition.

## 2023-03-14 NOTE — PROGRESS NOTES
Pt states that he is not ready for bipap at this time. RN at bedside.  Pt resting comfortably on NC.

## 2023-03-14 NOTE — PROGRESS NOTES
Several attempts were made in order to get blood cultures.    Dialysis nurse at bedside drawing from dialysis cath as well as phlebotomist finally being able to get peripheral cultures

## 2023-03-14 NOTE — PROGRESS NOTES
Department of Internal Medicine  Nephrology Progress Note        SUBJECTIVE:    We are following this patient for ESRD management. Patient denies any new complaints  HD on 3/13 for 2 hours 40 minutes with 2.6 L net UF, no weight available    ROS: No fever or chills. Social: No family at bedside. Physical Exam:    VITALS:  /74   Pulse 63   Temp 98.1 °F (36.7 °C)   Resp 18   Ht 5' 9\" (1.753 m)   Wt 218 lb 11.1 oz (99.2 kg)   SpO2 100%   BMI 32.30 kg/m²     General appearance: Seems comfortable, no acute distress. Neck: Trachea midline, thyroid normal.   Lungs:  Non labored breathing, CTA to anterior auscultation. Heart:  S1S2 normal, rub or gallop. No peripheral edema. Abdomen: Soft, non-tender, no organomegaly. Skin: No lesions or rashes, warm to touch.      DATA:    CBC with Differential:    Lab Results   Component Value Date/Time    WBC 11.6 03/14/2023 04:47 AM    RBC 3.99 03/14/2023 04:47 AM    HGB 11.7 03/14/2023 04:47 AM    HCT 36.2 03/14/2023 04:47 AM     03/14/2023 04:47 AM    MCV 90.9 03/14/2023 04:47 AM    MCH 29.3 03/14/2023 04:47 AM    MCHC 32.2 03/14/2023 04:47 AM    RDW 17.1 03/14/2023 04:47 AM    SEGSPCT 73.4 05/17/2013 06:50 AM    BANDSPCT 1 12/14/2022 05:36 AM    METASPCT 2 03/03/2022 07:41 AM    LYMPHOPCT 10.0 03/14/2023 04:47 AM    MONOPCT 6.8 03/14/2023 04:47 AM    MYELOPCT 1 03/03/2022 07:41 AM    BASOPCT 0.7 03/14/2023 04:47 AM    MONOSABS 0.8 03/14/2023 04:47 AM    LYMPHSABS 1.2 03/14/2023 04:47 AM    EOSABS 0.3 03/14/2023 04:47 AM    BASOSABS 0.1 03/14/2023 04:47 AM    DIFFTYPE Auto 05/17/2013 06:50 AM     BMP:    Lab Results   Component Value Date/Time     03/14/2023 04:47 AM    K 4.3 03/14/2023 04:47 AM    K 5.4 03/10/2023 09:20 AM    CL 90 03/14/2023 04:47 AM    CO2 20 03/14/2023 04:47 AM    BUN 65 03/14/2023 04:47 AM    LABALBU 4.0 03/14/2023 04:47 AM    CREATININE 6.4 03/14/2023 04:47 AM    CALCIUM 8.6 03/14/2023 04:47 AM    GFRAA 10 10/05/2022 11:53 AM GFRAA >60 05/17/2013 06:50 AM    LABGLOM 10 03/14/2023 04:47 AM    GLUCOSE 257 03/14/2023 04:47 AM   Summary    The left ventricular systolic function is severely reduced with an ejection    fraction of 25-30 %. There is hypokinesis of the inferior, basal inferior, apex, apical anterior    and anterolateral walls. Left ventricular cavity size is mildly dilated with normal left ventricular    wall thickness. Grade I diastolic dysfunction with normal filling pressure. No changes noted from previous echo on 12-9-2022 in left ventricular    function. Mild mitral and tricuspid regurgitation. Right ventricular systolic function is mildly reduced . Systolic pulmonic artery pressure (SPAP) is estimated at 40 mmHg consistent    with mild pulmonary hypertension (Right atrial pressure of 3 mmHg). IMPRESSION/RECOMMENDATIONS:      1- ESRD: We will continue hemodialysis per MWF schedule. Next HD on 3/15   2- Hyponatremia: Apply fluid restriction and attempt further UF. 3- Hyperkalemia: Mild, will apply low potassium diet. 4- HTN: Blood pressure within acceptable range. 5- Anemia: Hemoglobin above target for ESRD, hold DAVON and monitor. 6- Shortness of breath: Improving with further Ultrafiltration during dialysis.   7- staph epi bacteremia ( 1/2 sets)    Will follow ID recs --> recommending no antibiotics and repeat blood cultures, blood cultures were tried multiple times without success as the patient has poor IV, will ask dialysis nurse to draw blood cultures today

## 2023-03-14 NOTE — PROGRESS NOTES
Progress Note      PCP: Kaila Pulido MD    Date of Admission: 3/10/2023    Chief Complaint: SOB    Hospital Course:     Jayson Maciel is a 49-year-old male with past medical history of COPD, systolic heart failure, ESRD on HD, W, F, CAD, s/p TAVR, A-fib on Eliquis, history of CVA who presented to [facility] ED for shortness of breath. Patient arrived via EMS with O2 sats in route between 60s and 70s. Patient was started on BiPAP and given DuoNebs before arrival.  On ED presentation, WBC 16.3, pH 7.32, BNP 70,000, lactate 2.0, troponin 0.34. At this time, patient was meeting sepsis criteria and was found to have possible left-sided pneumonia on CXR. Patient was initially started on IV cefepime and subsequently transitioned to IV ceftriaxone and p.o. azithromycin. Blood culture returned +1 of 2 for Staph epidermidis. Infectious disease was consulted for the validity of this measure. Due to patient's increased BNP as well as vascular congestion and pleural effusion noted on CXR, patient had repeat echo on 3/13. Patient's home Entresto and metoprolol were continued throughout this hospitalization. Patient is not on diuretics chronically. Subjective:      Patient evaluated at bedside this AM.  Patient reported \"feeling awful\" and had an episode of emesis this morning on the bed/floor. Patient also reports diarrhea for the past two days. He states he feels warm but does not have a true fever noted.      Medications:  Reviewed    Infusion Medications    sodium chloride      sodium chloride      sodium chloride      nitroGLYCERIN      dextrose       Scheduled Medications    insulin lispro  0-16 Units SubCUTAneous TID WC    insulin lispro  0-4 Units SubCUTAneous Nightly    DULoxetine  60 mg Oral Daily    azithromycin  500 mg Oral Daily    cefTRIAXone (ROCEPHIN) IV  1,000 mg IntraVENous Q24H    sevelamer  1,600 mg Oral TID WC    sodium chloride flush  5-40 mL IntraVENous 2 times per day    vancomycin  15 mg/kg IntraVENous Once    sodium chloride flush  5-40 mL IntraVENous 2 times per day    apixaban  2.5 mg Oral BID    clopidogrel  75 mg Oral Daily    isosorbide dinitrate  20 mg Oral TID    metoprolol succinate  100 mg Oral BID    pantoprazole  40 mg Oral QAM AC    pravastatin  40 mg Oral Daily    QUEtiapine  50 mg Oral Nightly    lidocaine 1 % injection  5 mL IntraDERmal Once    sodium chloride flush  5-40 mL IntraVENous 2 times per day    b complex-C-folic acid  1 capsule Oral Daily    sacubitril-valsartan  1 tablet Oral BID     PRN Meds: oxyCODONE **OR** acetaminophen, loperamide, sodium chloride flush, sodium chloride, sodium chloride flush, sodium chloride, ipratropium-albuterol, sodium chloride flush, sodium chloride, perflutren lipid microspheres, heparin (porcine), glucose, dextrose bolus **OR** dextrose bolus, glucagon (rDNA), dextrose      Intake/Output Summary (Last 24 hours) at 3/14/2023 0931  Last data filed at 3/13/2023 2110  Gross per 24 hour   Intake 890 ml   Output 3000 ml   Net -2110 ml       Physical Exam Performed:    /63   Pulse 54   Temp 96.9 °F (36.1 °C) (Axillary)   Resp 20   Ht 5' 9\" (1.753 m)   Wt 218 lb 11.1 oz (99.2 kg)   SpO2 100%   BMI 32.30 kg/m²     General appearance: No apparent distress, appears stated age and cooperative. HEENT: Pupils equal, round, and reactive to light. Conjunctivae/corneas clear. Neck: Supple, with full range of motion. No jugular venous distention. Trachea midline. Respiratory:  Normal respiratory effort. Diminished breath sounds on left side. Cardiovascular: Regular rate and rhythm with normal S1/S2 without murmurs, rubs or gallops. Abdomen: Soft, non-tender, non-distended with normal bowel sounds. Musculoskeletal: No clubbing, cyanosis or edema bilaterally. Full range of motion without deformity. Skin: Skin color, texture, turgor normal.  No rashes or lesions. Neurologic:  Neurovascularly intact without any focal sensory/motor deficits. Cranial nerves: II-XII intact, grossly non-focal.  Psychiatric: Alert and oriented, thought content appropriate, normal insight  Capillary Refill: Brisk,3 seconds, normal   Peripheral Pulses: +2 palpable, equal bilaterally       Labs:   Recent Labs     03/12/23 0450 03/13/23 1825 03/14/23 0447   WBC 11.4* 12.8* 11.6*   HGB 11.6* 11.4* 11.7*   HCT 36.3* 35.4* 36.2*    287 258     Recent Labs     03/12/23 0450 03/13/23 1825 03/14/23 0447   * 128* 130*   K 5.1 5.2* 4.3   CL 88* 86* 90*   CO2 17* 19* 20*   BUN 74* 100* 65*   CREATININE 6.9* 8.3* 6.4*   CALCIUM 8.8 8.6 8.6   PHOS 9.7* 10.0* 7.1*     No results for input(s): AST, ALT, BILIDIR, BILITOT, ALKPHOS in the last 72 hours. No results for input(s): INR in the last 72 hours. No results for input(s): [place] in the last 72 hours. Urinalysis:      Lab Results   Component Value Date/Time    NITRU Negative 12/25/2022 05:00 AM    WBCUA 6-9 12/25/2022 05:00 AM    BACTERIA Rare 12/25/2022 05:00 AM    RBCUA 3-4 12/25/2022 05:00 AM    BLOODU SMALL 12/25/2022 05:00 AM    SPECGRAV 1.020 12/25/2022 05:00 AM    GLUCOSEU 500 12/25/2022 05:00 AM       Radiology:  XR CHEST (2 VW)   Final Result   Findings most compatible pulmonary edema, which appears increased when   compared to the previous exam.  With that said, pneumonia remains in the   differential.         XR CHEST PORTABLE   Final Result   Left pleural effusion. Mild vascular congestion. Consolidative airspace   disease in the left lung base which could represent infiltrate or mass. Follow up to resolution is suggested.                  Assessment/Plan:    Active Hospital Problems    Diagnosis     Acute on chronic systolic HF (heart failure) (HCC) [I50.23]      Priority: High    CAD in native artery [I25.10]      Priority: High    Positive blood culture [R78.81]      Priority: Medium    Elevated troponin [R77.8]      Priority: Medium    Severe sepsis (HCC) [A41.9, R65.20] Priority: Medium     Sepsis secondary to LLL pneumonia  - SIRS criteria met on admission: WBC 16.3, lactate 2.0, tachycardic 113, RR 24  - Clinical sepsis resolved  - On ceftriaxone IV and po azithromycin  - Initial BC positive 1/2 with staph epidermidis, possible contamination  - ID consulted, agree with low suspicion of true bacteremia  - Repeat BC ordered, not completed  - WBC down trending, 11.6 on 3/14  - Afebrile throughout hospitalization    Acute hypoxic respiratory failure  - Baseline 3-3.5 L NC  - O2 Sat 60s-70s per EMS, intially on Bipap  - pH 7.32 on admission  - Weaned to 4L NC on 3/13 AM  - Will continue to titrate to baseline    Chronic systolic heart failure  - BNP: >70,000 on admission (this appears to be consistent with prior admissions per chart review)  - Repeat ECHO 3/13: results pending  - Daily weights (baseline 102 kg?)  - Strict I/Os  - Heart failure RN consulted  - Continue Entresto and metoprolol    ESRD on HD M, W, F  - Cr 6.4 on 3/14  - Dialysis 3/13: -3,000 mL  - Per patient, has been on dialysis for ~3 years    Emesis/diarrhea  - Per patient, 2 day history of diarrhea and one episode of emesis this morning  - Suspect related to antibiotic administration/increased dose of Cymbalta  - Afebrile, low suspicion of C diff at this time given the timing of onset shortly after starting antibiotics  - Zofran and imodium available PRN  - Will consider C diff/stool study testing if symptoms persist    Elevated troponin  - Troponin 0.34 on admission  - EKG without concern for ischemia  - Cardiology evaluated, likely secondary to supply/demand mismatch    Suicidal ideation  - Noted on 3/13 AM with bedside conversation   - Psych consulted, increased cymbalta to 60mg  - Palliative care consulted for [facility]    T2DM  - POC -242 over past 2 days  - Increased to high dose corrective scale and 5 units lantus at bedtime    A fib  - HR:54  - Continue Eliquis, rate control with metoprolol    CAD, s/p TAVR  - Continue pravastatin, plavix    DVT Prophylaxis: Eliquis  Diet: ADULT ORAL NUTRITION SUPPLEMENT; Breakfast, Dinner, Lunch; Diabetic Oral Supplement  ADULT DIET; Regular; 4 carb choices (60 gm/meal); No Added Salt (3-4 gm); Low Potassium (Less than 3000 mg/day);  Low Phosphorus (Less than 1000 mg); 1000 ml  Code Status: Limited    PT/OT Eval Status: not indicated    Dispo - pending palliative care, clinical improvement, repeat BC    Kannan Stuart MD

## 2023-03-14 NOTE — PROGRESS NOTES
03/14/23 0000   NIV Type   $NIV $Daily Charge   Skin Assessment Clean, dry, & intact   Skin Protection for O2 Device No  (pt refuses)   NIV Started/Stopped On   Equipment Type v60   Mode Bilevel   Mask Type Full face mask   Mask Size Large   Settings/Measurements   PIP Observed 11 cm H20   IPAP 10 cmH20   CPAP/EPAP 5 cmH2O   Vt (Measured) 755 mL   Rate Ordered 10   Resp 20   FiO2  40 %  (decreased to 35%)   I Time/ I Time % 1 s   Minute Volume (L/min) 12.2 Liters   Comfort Level Good   Using Accessory Muscles No   SpO2 100   Patient's Home Machine No   Alarm Settings   Alarms On Y   Low Pressure (cmH2O) 6 cmH2O   High Pressure (cmH2O) 30 cmH2O   Apnea (secs) 20 secs   RR Low (bpm) 6   RR High (bpm) 40 br/min

## 2023-03-14 NOTE — PROGRESS NOTES
[facility] 79   Daily Progress Note      Admit Date:  3/10/2023    Reason for follow up visit: SOB    CC: \"I'm feeling some better. My breathing still gets short at times. \"    44 y/o male with PMH significant for CAD/prior stent to LAD and RCA, aortic stenosis and S/P TAVR in 2019, PVD/prior PTA to R iliac artery,  ESRD on HD, diabetes mellitus, COPD, HTN, HLP and sleep apnea who was admitted with increasing SOB and chest pressure. He presented to ED and underwent urgent dialysis. Troponin level and ProBNP elevated and similar to prior values with slightly elevated troponin level from his prior. He was placed on BiPap, but now on O2. He continues with dialysis (M-W-F) schedule     Interval History:  Pt. seen and examined; records reviewed  BP stable  Wt today 218#   Net diuresis -3.7L  Remains on O2 @ 3L; was on room air yesterday   Denies anginal chest pain  Has some mild pleuritic pain but overall improved from previous  Remains SOB  Dialysis yesterday: on M-W-F schedule    Subjective:  Pt with no acute overnight cardiac events. Denies anginal chest pain, productive cough, palpitations, dizziness  + SOB improving    Objective:   /63   Pulse 54   Temp 96.9 °F (36.1 °C) (Axillary)   Resp 20   Ht 5' 9\" (1.753 m)   Wt 218 lb 11.1 oz (99.2 kg)   SpO2 100%   BMI 32.30 kg/m²     Intake/Output Summary (Last 24 hours) at 3/14/2023 0926  Last data filed at 3/13/2023 2110  Gross per 24 hour   Intake 890 ml   Output 3000 ml   Net -2110 ml     Wt Readings from Last 3 Encounters:   03/13/23 218 lb 11.1 oz (99.2 kg)   02/06/23 221 lb 9 oz (100.5 kg)   01/28/23 227 lb (103 kg)       Physical Exam:  General: In no acute distress. Awake, alert, and oriented X4. Sitting on side of bed  HEENT: Sclerae anicteric. Normocephalic  Skin:  Warm and dry. Neck:  Supple and thick. JVD hard to assess d/t body habitus  Chest:  Lungs clear with minimally diminished breath sounds in posterior bases.   No wheezes/rhonchi/rales  Cardiovascular:  RRR. Normal S1 and S2. II/VI systolic murmur  Abdomen:  obese, soft, nontender, +bowel sounds. Extremities:  No LE edema. No clubbing or cyanosis.  1+ bilateral radial/DP/PT pulses    Medications:    insulin lispro  0-16 Units SubCUTAneous TID WC    insulin lispro  0-4 Units SubCUTAneous Nightly    DULoxetine  60 mg Oral Daily    azithromycin  500 mg Oral Daily    cefTRIAXone (ROCEPHIN) IV  1,000 mg IntraVENous Q24H    sevelamer  1,600 mg Oral TID WC    sodium chloride flush  5-40 mL IntraVENous 2 times per day    vancomycin  15 mg/kg IntraVENous Once    sodium chloride flush  5-40 mL IntraVENous 2 times per day    apixaban  2.5 mg Oral BID    clopidogrel  75 mg Oral Daily    isosorbide dinitrate  20 mg Oral TID    metoprolol succinate  100 mg Oral BID    pantoprazole  40 mg Oral QAM AC    pravastatin  40 mg Oral Daily    QUEtiapine  50 mg Oral Nightly    lidocaine 1 % injection  5 mL IntraDERmal Once    sodium chloride flush  5-40 mL IntraVENous 2 times per day    b complex-C-folic acid  1 capsule Oral Daily    sacubitril-valsartan  1 tablet Oral BID      sodium chloride      sodium chloride      sodium chloride      nitroGLYCERIN      dextrose       oxyCODONE **OR** acetaminophen, loperamide, sodium chloride flush, sodium chloride, sodium chloride flush, sodium chloride, ipratropium-albuterol, sodium chloride flush, sodium chloride, perflutren lipid microspheres, heparin (porcine), glucose, dextrose bolus **OR** dextrose bolus, glucagon (rDNA), dextrose    Lab Data:  CBC:   Recent Labs     03/12/23 0450 03/13/23 1825 03/14/23 0447   WBC 11.4* 12.8* 11.6*   HGB 11.6* 11.4* 11.7*    287 258     BMP:    Recent Labs     03/12/23 0450 03/13/23 1825 03/14/23 0447   * 128* 130*   K 5.1 5.2* 4.3   CO2 17* 19* 20*   BUN 74* 100* 65*   CREATININE 6.9* 8.3* 6.4*     Echo: 3/13/23   The left ventricular systolic function is severely reduced with an ejection fraction of 25-30 %.   There is hypokinesis of the inferior, basal inferior, apex, apical anterior   and anterolateral walls.   Left ventricular cavity size is mildly dilated with normal left ventricular   wall thickness.   Grade I diastolic dysfunction with normal filling pressure.   No changes noted from previous echo on 12-9-2022 in left ventricular   function.   Mild mitral and tricuspid regurgitation.   Right ventricular systolic function is mildly reduced .   Systolic pulmonic artery pressure (SPAP) is estimated at 40 mmHg consistent   with mild pulmonary hypertension (Right atrial pressure of 3 mmHg).     12/2022  TTE  Summary   Limited only f/u for LVEF and pericardial effusion.   The left ventricular systolic function is severely reduced with an ejection   fraction of 25- 30%.   Changes noted from previous echo on 9- in left ventricular function.   There is a trivial to small localized near left and right ventricle   pericardial effusion noted.   No tamponade physiology.        5/2022 TTE   Summary   Definity® used for myocardial border enhancement.   Left ventricular systolic function is severely reduced with a visually   estimated ejection fraction of 20-25 %.   EF estimated by Jasmine's method at 24 %.   The left ventricle is mildly dilated in size with normal wall thickness.   Severe global hypokinesis with regional variation.   Grade I diastolic dysfunction with normal LV pressure.   There is no evidence of a left ventricular thrombus.   Right ventricular systolic function is reduced.   A 29 mm Langford Leyda S3 bioprosthetic aortic valve appears well seated   with normal Doppler values.   Inadequate tricuspid valve regurgitation to estimate systolic pulmonary   artery pressure.   There is a moderate left pleural effusion noted.        Stress Test:  5/2022          Summary    Severe LV dysfunction EF 26%    Global hypokinesis with regional variation, more pronounced in inferolateral    wall and  apex    Large mainly fixed defect in inferior wall, extends to portions of lateral    wall and apex with moderate darrick-infarct ischemia        Overall, this would be considered an abnormal, higher risk, study                CARDIAC CATHETERIZATION REPORT      Procedure Date:  2022  Patient Name: Zoe Moreno  MRN: [id number]      : 1970     FINDINGS      HEMODYNAMICS     CHAMBER PRESSURE SATURATION   RA  10 60%   RV  47/9     PA  50/20  67%   PW  19     AORTA  101/50  95%      NANCY CARDIAC OUTPUT  5.8 L/min   SVR  756    PVR  234       LVGRAM     LVEDP  21   GRADIENT ACROSS AORTIC VALVE  less than 5 mmHg   LV FUNCTION EF 20%   WALL MOTION  severe global hypokinesis with regional variation   MITRAL REGURGITATION  mild         CORONARY ARTERIES     LM Less than 10% [id number] stenosis            LAD Moderately calcified, 20 to 30% proximal-mid stenosis, distal vessel tapers at the apex with 60% diffuse disease. D1 has an ostial/proximal 75 to 80% stenosis. LCX  Calcified, proximal-mid 75 to 80% stenosis. Distal vessel is tortuous with 70 to 85% diffuse stenosis with more discrete stenosis noted distally. OM 1 is a very small vessel with ostial/proximal 80% stenosis and 60 to 70% diffuse disease in the zoyeyiaq-tvp-ypsvuj vessel. [place] RCA Dominant, calcified, tortuous, there is a proximal-mid stent with 60 to 70% in-stent restenosis. Distal vessel 20 to 30% stenosis         CONCLUSIONS:      Mild to moderately increased filling pressures  Patent RCA stent with moderate to borderline severe in-stent restenosis  Severe circumflex and D1 stenosis  Continue medical management, consider outpatient high risk PCI of above territories pending outpatient clinical follow-up. Currently medical management seems best given comorbidities and need for additional fluid removal.  If PCI is pursued, will likely need general anesthesia.   Patient had difficulty lying still on Cath Lab table.      Telemetry: Sinus rhythm  (Personally reviewed)    Assessment/Plan:    1) Elevated troponin/Coronary artery disease  -2/2/ supply/demand mismatch in setting of known CAD and hypoxia  -no recurrent angina  -prior PCI's  -continue medical management with plavix, Toprol, statin and isosorbide  -risk factor modification     2) H/O Atrial fibrillation  -he continues to maintain SR  -remains on Eliquis     3) Ischemic cardiomyopathy  -last LVEF 25-30%; Stage C; NYHA class III  -continue volume control with dialysis  -continue Toprol and Entresto  -not on aldactone or SGLT2 inhibitor d/t dialysis  -no ICD; he is limited code and does not want defibrillation     4) Primary hypertension  -well controlled and @ goal  -continue medical management with Toprol and Entresto     5) ESRD on HD  -nephrology following and continues to dialyze     6) Acute on chronic respiratory failure  -secondary to CHF + PNA  -continues to need O2 intermittently  -he uses CPAP at night chronically  -remains on antibiotics per Primary team     7) Diabetes Mellitus  -Rx per Primary team    He continues to undergo dialysis for volume control (at direction of nephrology). No further cardiac issues noted at this time. Will no longer actively follow as an inpatient from cardiac standpoint. He is scheduled to follow up with cardiology: scheduled on 3/28/23 @ 11:15 AM with DAVID Sky CNP    Discussed low-fat/low sodium diet, monitoring of daily weights, fluid restriction, worsening signs and symptoms of heart failure and when to call, and the importance of regular activity.      Continue Eliquis, plavix, isosorbide, Toprol, pravastatin, Entresto    Thank you for allowing us to participate in Mr. Fritz huddleston    Electronically signed by JAY Hael CNP on 3/14/2023 at 9:26 AM

## 2023-03-14 NOTE — PROGRESS NOTES
[address] 53 [id number]    History:  Past Medical History:   Diagnosis Date    Ambulatory dysfunction     walker for long distances, SOB with distance    Aortic stenosis     echo 2017    Arthritis     hands and hips    Asthma     Bilateral hilar adenopathy syndrome 6/3/2013    CAD (coronary artery disease)     Dr. Joe Wise [facility]) 04/19/2019    EF= 43%    CHF (congestive heart failure) ([address]     Chronic pain     COPD (chronic obstructive pulmonary disease) ([address]     pulmonology Dr. Veronica Rodríguez    Depression     Diabetes mellitus ([address]     borderline    Difficult intravenous access     Emphysema of lung ([address]     ESRD (end stage renal disease) on dialysis ([address]     MWF    Fear of needles     Gastric ulcer     GERD (gastroesophageal reflux disease)     Heart valve problem     bicuspic valve    Hemodialysis patient ([address]     History of spinal fracture     work incident    Hx of blood clots     Bilateral lower extremities; stents in place    Hyperlipidemia     Hypertension     MI (myocardial infarction) ([address] 2019    has had 9 MIs. 2019 was the last    Neuromuscular disorder ([address]     due to CVA    Numbness and tingling in left arm     from fistula    Pneumonia     PONV (postoperative nausea and vomiting)     Prolonged emergence from general anesthesia     States requires more medication than most people    Sleep apnea     Uses CPAP    Stroke ([facility]     7mm thalamic cva 2017 deficts left side, left side weakness    TIA (transient ischemic attack)     Unspecified diseases of blood and blood-forming organs        ECHO:  12/9/22   EF 25-30%  HgA1C:  12/25/22   6.9  Iron Saturation:  1/30/23   11  Ferritin:     ACE/ARB/ARNI: Raymondo Alvin 24-26mg tablets, 1 BID  BB: Toprol XL 100mg BID  Spironolactone:  Isordil 20 mg TID  SGLT2:    Last Hospital Admission:  1/29/23  acute respiratory failure  Discharge plans:  return home with wife.      Advanced Directives: patient has advance directives scanned in the chart    Chart review completed. Patient a 45year old male, admitted for septicemia. Hospital day 4, currently on C4 level of care. Hospital Medicine, cardiology, nephrology, infectious disease, and psychiatry following patient. Fluid management via dialysis/ultrafiltration. Pt also seen by palliative care who amended code status to limited with No x 4. Seen by cardiology who notes patient with suicide ideation but no active plan. Pt in palliative care note has stated that he would not continue aggressive care except that his wife wishes he does. Awaiting blood cultures results. Following for needs.       Patient recent weights and intake/output reviewed:    Patient Vitals for the past 96 hrs (Last 3 readings):   Weight   03/13/23 2110 218 lb 11.1 oz (99.2 kg)   03/13/23 0511 224 lb 1.6 oz (101.7 kg)   03/12/23 0624 221 lb 14.4 oz (100.7 kg)         Intake/Output Summary (Last 24 hours) at 3/14/2023 1232  Last data filed at 3/13/2023 2110  Gross per 24 hour   Intake 880 ml   Output 3000 ml   Net -2120 ml     Education Time: chart review    Darrell Ray RN   3/14/2023 12:32 PM

## 2023-03-15 LAB
ALBUMIN SERPL-MCNC: 3.8 G/DL (ref 3.4–5)
ANION GAP SERPL CALCULATED.3IONS-SCNC: 20 MMOL/L (ref 3–16)
ANISOCYTOSIS BLD QL SMEAR: ABNORMAL
BASOPHILS # BLD: 0 K/UL (ref 0–0.2)
BASOPHILS NFR BLD: 0 %
BUN SERPL-MCNC: 84 MG/DL (ref 7–20)
CALCIUM SERPL-MCNC: 8.9 MG/DL (ref 8.3–10.6)
CHLORIDE SERPL-SCNC: 87 MMOL/L (ref 99–110)
CO2 SERPL-SCNC: 19 MMOL/L (ref 21–32)
CREAT SERPL-MCNC: 8.3 MG/DL (ref 0.9–1.3)
DEPRECATED RDW RBC AUTO: 17.3 % (ref 12.4–15.4)
EOSINOPHIL # BLD: 0.3 K/UL (ref 0–0.6)
EOSINOPHIL NFR BLD: 3 %
GFR SERPLBLD CREATININE-BSD FMLA CKD-EPI: 7 ML/MIN/{1.73_M2}
GLUCOSE BLD-MCNC: 157 MG/DL (ref 70–99)
GLUCOSE BLD-MCNC: 157 MG/DL (ref 70–99)
GLUCOSE BLD-MCNC: 202 MG/DL (ref 70–99)
GLUCOSE BLD-MCNC: 220 MG/DL (ref 70–99)
GLUCOSE SERPL-MCNC: 190 MG/DL (ref 70–99)
HCT VFR BLD AUTO: 37.1 % (ref 40.5–52.5)
HGB BLD-MCNC: 12 G/DL (ref 13.5–17.5)
LYMPHOCYTES # BLD: 2 K/UL (ref 1–5.1)
LYMPHOCYTES NFR BLD: 15 %
MACROCYTES BLD QL SMEAR: ABNORMAL
MCH RBC QN AUTO: 29.6 PG (ref 26–34)
MCHC RBC AUTO-ENTMCNC: 32.4 G/DL (ref 31–36)
MCV RBC AUTO: 91.6 FL (ref 80–100)
MONOCYTES # BLD: 1.2 K/UL (ref 0–1.3)
MONOCYTES NFR BLD: 10 %
MYELOCYTES NFR BLD MANUAL: 3 %
NEUTROPHILS # BLD: 8.1 K/UL (ref 1.7–7.7)
NEUTROPHILS NFR BLD: 65 %
NEUTS BAND NFR BLD MANUAL: 2 % (ref 0–7)
PERFORMED ON: ABNORMAL
PHOSPHATE SERPL-MCNC: 10.9 MG/DL (ref 2.5–4.9)
PLATELET # BLD AUTO: 248 K/UL (ref 135–450)
PLATELET BLD QL SMEAR: ADEQUATE
PMV BLD AUTO: 7.4 FL (ref 5–10.5)
POLYCHROMASIA BLD QL SMEAR: ABNORMAL
POTASSIUM SERPL-SCNC: 5.4 MMOL/L (ref 3.5–5.1)
RBC # BLD AUTO: 4.05 M/UL (ref 4.2–5.9)
SLIDE REVIEW: ABNORMAL
SODIUM SERPL-SCNC: 126 MMOL/L (ref 136–145)
SPHEROCYTES BLD QL SMEAR: ABNORMAL
VARIANT LYMPHS NFR BLD MANUAL: 2 % (ref 0–6)
WBC # BLD AUTO: 11.6 K/UL (ref 4–11)

## 2023-03-15 PROCEDURE — 2060000000 HC ICU INTERMEDIATE R&B

## 2023-03-15 PROCEDURE — 94660 CPAP INITIATION&MGMT: CPT

## 2023-03-15 PROCEDURE — 2580000003 HC RX 258: Performed by: EMERGENCY MEDICINE

## 2023-03-15 PROCEDURE — 6370000000 HC RX 637 (ALT 250 FOR IP): Performed by: INTERNAL MEDICINE

## 2023-03-15 PROCEDURE — 97535 SELF CARE MNGMENT TRAINING: CPT

## 2023-03-15 PROCEDURE — 80069 RENAL FUNCTION PANEL: CPT

## 2023-03-15 PROCEDURE — 97165 OT EVAL LOW COMPLEX 30 MIN: CPT

## 2023-03-15 PROCEDURE — 97161 PT EVAL LOW COMPLEX 20 MIN: CPT

## 2023-03-15 PROCEDURE — 97530 THERAPEUTIC ACTIVITIES: CPT

## 2023-03-15 PROCEDURE — 2580000003 HC RX 258: Performed by: INTERNAL MEDICINE

## 2023-03-15 PROCEDURE — 6360000002 HC RX W HCPCS: Performed by: INTERNAL MEDICINE

## 2023-03-15 PROCEDURE — 6370000000 HC RX 637 (ALT 250 FOR IP)

## 2023-03-15 PROCEDURE — 85025 COMPLETE CBC W/AUTO DIFF WBC: CPT

## 2023-03-15 PROCEDURE — 97110 THERAPEUTIC EXERCISES: CPT

## 2023-03-15 PROCEDURE — 36415 COLL VENOUS BLD VENIPUNCTURE: CPT

## 2023-03-15 PROCEDURE — 90935 HEMODIALYSIS ONE EVALUATION: CPT

## 2023-03-15 RX ORDER — LACTOBACILLUS RHAMNOSUS GG 10B CELL
1 CAPSULE ORAL
Status: DISCONTINUED | OUTPATIENT
Start: 2023-03-16 | End: 2023-03-16 | Stop reason: HOSPADM

## 2023-03-15 RX ORDER — AMOXICILLIN AND CLAVULANATE POTASSIUM 250; 125 MG/1; MG/1
1 TABLET, FILM COATED ORAL DAILY
Status: DISCONTINUED | OUTPATIENT
Start: 2023-03-15 | End: 2023-03-15

## 2023-03-15 RX ORDER — AMOXICILLIN AND CLAVULANATE POTASSIUM 250; 125 MG/1; MG/1
1 TABLET, FILM COATED ORAL DAILY
Status: DISCONTINUED | OUTPATIENT
Start: 2023-03-16 | End: 2023-03-16 | Stop reason: HOSPADM

## 2023-03-15 RX ORDER — HEPARIN SODIUM 1000 [USP'U]/ML
INJECTION, SOLUTION INTRAVENOUS; SUBCUTANEOUS
Status: DISPENSED
Start: 2023-03-15 | End: 2023-03-16

## 2023-03-15 RX ADMIN — SEVELAMER CARBONATE 1600 MG: 800 TABLET, FILM COATED ORAL at 11:46

## 2023-03-15 RX ADMIN — Medication 10 ML: at 21:34

## 2023-03-15 RX ADMIN — OXYCODONE HYDROCHLORIDE 5 MG: 5 TABLET ORAL at 05:09

## 2023-03-15 RX ADMIN — AZITHROMYCIN MONOHYDRATE 500 MG: 250 TABLET ORAL at 08:40

## 2023-03-15 RX ADMIN — METOPROLOL SUCCINATE 100 MG: 50 TABLET, EXTENDED RELEASE ORAL at 08:40

## 2023-03-15 RX ADMIN — PANTOPRAZOLE SODIUM 40 MG: 40 TABLET, DELAYED RELEASE ORAL at 05:08

## 2023-03-15 RX ADMIN — APIXABAN 2.5 MG: 5 TABLET, FILM COATED ORAL at 21:32

## 2023-03-15 RX ADMIN — ISOSORBIDE DINITRATE 20 MG: 20 TABLET ORAL at 08:40

## 2023-03-15 RX ADMIN — CEFTRIAXONE SODIUM 1000 MG: 1 INJECTION, POWDER, FOR SOLUTION INTRAMUSCULAR; INTRAVENOUS at 08:50

## 2023-03-15 RX ADMIN — ISOSORBIDE DINITRATE 20 MG: 20 TABLET ORAL at 21:31

## 2023-03-15 RX ADMIN — SEVELAMER CARBONATE 1600 MG: 800 TABLET, FILM COATED ORAL at 08:39

## 2023-03-15 RX ADMIN — QUETIAPINE FUMARATE 50 MG: 25 TABLET ORAL at 21:31

## 2023-03-15 RX ADMIN — NEPHROCAP 1 MG: 1 CAP ORAL at 08:39

## 2023-03-15 RX ADMIN — APIXABAN 2.5 MG: 5 TABLET, FILM COATED ORAL at 08:39

## 2023-03-15 RX ADMIN — Medication 10 ML: at 08:41

## 2023-03-15 RX ADMIN — METOPROLOL SUCCINATE 100 MG: 50 TABLET, EXTENDED RELEASE ORAL at 21:31

## 2023-03-15 RX ADMIN — OXYCODONE HYDROCHLORIDE 5 MG: 5 TABLET ORAL at 11:46

## 2023-03-15 RX ADMIN — INSULIN LISPRO 4 UNITS: 100 INJECTION, SOLUTION INTRAVENOUS; SUBCUTANEOUS at 12:07

## 2023-03-15 RX ADMIN — PRAVASTATIN SODIUM 40 MG: 40 TABLET ORAL at 08:40

## 2023-03-15 RX ADMIN — SODIUM CHLORIDE, PRESERVATIVE FREE 10 ML: 5 INJECTION INTRAVENOUS at 21:35

## 2023-03-15 RX ADMIN — SEVELAMER CARBONATE 1600 MG: 800 TABLET, FILM COATED ORAL at 18:10

## 2023-03-15 RX ADMIN — DULOXETINE HYDROCHLORIDE 60 MG: 60 CAPSULE, DELAYED RELEASE ORAL at 08:39

## 2023-03-15 RX ADMIN — INSULIN GLARGINE 5 UNITS: 100 INJECTION, SOLUTION SUBCUTANEOUS at 21:32

## 2023-03-15 RX ADMIN — CLOPIDOGREL BISULFATE 75 MG: 75 TABLET ORAL at 08:40

## 2023-03-15 RX ADMIN — SACUBITRIL AND VALSARTAN 1 TABLET: 24; 26 TABLET, FILM COATED ORAL at 08:39

## 2023-03-15 RX ADMIN — OXYCODONE HYDROCHLORIDE 5 MG: 5 TABLET ORAL at 21:31

## 2023-03-15 RX ADMIN — SACUBITRIL AND VALSARTAN 1 TABLET: 24; 26 TABLET, FILM COATED ORAL at 21:31

## 2023-03-15 ASSESSMENT — PAIN SCALES - GENERAL
PAINLEVEL_OUTOF10: 3
PAINLEVEL_OUTOF10: 7
PAINLEVEL_OUTOF10: 8
PAINLEVEL_OUTOF10: 8

## 2023-03-15 ASSESSMENT — PAIN DESCRIPTION - LOCATION
LOCATION: BACK

## 2023-03-15 ASSESSMENT — PAIN SCALES - WONG BAKER: WONGBAKER_NUMERICALRESPONSE: 0

## 2023-03-15 ASSESSMENT — PAIN - FUNCTIONAL ASSESSMENT: PAIN_FUNCTIONAL_ASSESSMENT: ACTIVITIES ARE NOT PREVENTED

## 2023-03-15 ASSESSMENT — PAIN DESCRIPTION - DESCRIPTORS: DESCRIPTORS: ACHING

## 2023-03-15 NOTE — PROGRESS NOTES
03/15/23 0353   NIV Type   Skin Assessment Clean, dry, & intact   Skin Protection for O2 Device No  (Pateint refused)   Equipment Type V60   Mode Bilevel   Mask Type Full face mask   Mask Size Large   Settings/Measurements   PIP Observed 11 cm H20   IPAP 10 cmH20   CPAP/EPAP 5 cmH2O   Vt (Measured) 493 mL   Rate Ordered 10   Resp 16   FiO2  35 %   I Time/ I Time % 1 s   Minute Volume (L/min) 8.4 Liters   Mask Leak (lpm) 50 lpm   Comfort Level Good   Using Accessory Muscles No   SpO2 99   Alarm Settings   Alarms On Y   Low Pressure (cmH2O) 6 cmH2O   High Pressure (cmH2O) 30 cmH2O   Apnea (secs) 20 secs   RR Low (bpm) 6   RR High (bpm) 40 br/min   Oxygen Therapy/Pulse Ox   O2 Device PAP (positive airway pressure)   Heart Rate 64   SpO2 99 %

## 2023-03-15 NOTE — DIALYSIS
pt tolerated HD tx well w/ 2L removed. VSS. Pt voiced no concerns. Dc'd from HD unit in stable condition.

## 2023-03-15 NOTE — PLAN OF CARE
Problem: Discharge Planning  Goal: Discharge to home or other facility with appropriate resources  Outcome: Progressing     Problem: Chronic Conditions and Co-morbidities  Goal: Patient's chronic conditions and co-morbidity symptoms are monitored and maintained or improved  Outcome: Progressing     Problem: Pain  Goal: Verbalizes/displays adequate comfort level or baseline comfort level  Outcome: Progressing     Problem: Safety - Adult  Goal: Free from fall injury  Outcome: Progressing     Problem: Respiratory - Adult  Goal: Achieves optimal ventilation and oxygenation  Outcome: Progressing     Problem: Cardiovascular - Adult  Goal: Maintains optimal cardiac output and hemodynamic stability  Outcome: Progressing     Problem: Metabolic/Fluid and Electrolytes - Adult  Goal: Glucose maintained within prescribed range  Outcome: Progressing     Patient's EF (Ejection Fraction) is less than 40%    Heart Failure Medications:  Diuretics[de-identified]  hd and None    (One of the following REQUIRED for EF </= 40%/SYSTOLIC FAILURE but MAY be used in EF% >40%/DIASTOLIC FAILURE)        ACE[de-identified] None        ARB[de-identified] None         ARNI[de-identified] Sacubitril/Valsartan-Entresto    (Beta Blockers)  NON- Evidenced Based Beta Blocker (for EF% >40%/DIASTOLIC FAILURE): None    Evidenced Based Beta Blocker::(REQUIRED for EF% <40%/SYSTOLIC FAILURE) Metoprolol SUCCinate- Toprol XL  . .................................................................................................................................................. Patient's weights and intake/output reviewed: Yes    Patient's Last Weight: 221 lbs obtained by standing scale. Difference of 0 lbs less than last documented weight.       Intake/Output Summary (Last 24 hours) at 3/15/2023 1105  Last data filed at 3/15/2023 0506  Gross per 24 hour   Intake 240 ml   Output 0 ml   Net 240 ml       Daily Weight log at bedside and being used: [email or web address] Provided: yes    Comorbidities Reviewed Yes    Patient has a past medical history of Ambulatory dysfunction, Aortic stenosis, Arthritis, Asthma, Bilateral hilar adenopathy syndrome, CAD (coronary artery disease), Cardiomyopathy ([address] CHF (congestive heart failure) ([address] Chronic pain, COPD (chronic obstructive pulmonary disease) ([address] Depression, Diabetes mellitus ([address] Difficult intravenous access, Emphysema of lung ([address] ESRD (end stage renal disease) on dialysis ([address] Fear of needles, Gastric ulcer, GERD (gastroesophageal reflux disease), Heart valve problem, Hemodialysis patient ([address] History of spinal fracture, Hx of blood clots, Hyperlipidemia, Hypertension, MI (myocardial infarction) ([address] Neuromuscular disorder ([address] Numbness and tingling in left arm, Pneumonia, PONV (postoperative nausea and vomiting), Prolonged emergence from general anesthesia, Sleep apnea, Stroke ([address] TIA (transient ischemic attack), and Unspecified diseases of blood and blood-forming organs. >>For CHF and Comorbidity documentation on Education Time and Topics, please see Education Tab    Progressive Mobility Assessment:  What is this patient's Current Level of Mobility?: Ambulatory-Up Ad Magalis  How was this patient Mobilized today?: Edge of Bed, Up to Chair, Bedside Commode,  Up to Toilet/Shower, and Up in Room, ambulated 10 ft                 With Whom? Nurse, PCA, PT, OT, and Self                 Level of Difficulty/Assistance: Independent     Pt resting in bed at this time on  3 L O2. Pt denies shortness of breath. Pt with nonpitting lower extremity edema. Patient and/or Family's stated Goal of Care this Admission: reduce shortness of breath, increase activity tolerance, better understand heart failure and disease management, be more comfortable, and reduce lower extremity edema prior to discharge  20 min review.        Sana Carnes

## 2023-03-15 NOTE — FLOWSHEET NOTE
03/14/23 2042   Vital Signs   Temp 97.4 °F (36.3 °C)   Temp Source Oral   Heart Rate 60   Heart Rate Source Monitor   Resp 18   BP (!) 146/82   MAP (Calculated) 103   MAP (mmHg) 103   BP Location Right upper arm   BP Method Automatic   Patient Position Sitting   Level of Consciousness 0   MEWS Score 1   Oxygen Therapy   SpO2 100 %   O2 Device Nasal cannula   O2 Flow Rate (L/min) 3 L/min

## 2023-03-15 NOTE — CARE COORDINATION
Patient ESRD. From home with spouse. PT/OT rec's for home with assist PRN. He is established with [facility] and is on a MWF schedule. He is active with [facility] and plans to return home and resume services. ID following. Will continue to follow and monitor for specialty recommendations and assist with any needs or barriers to discharge.

## 2023-03-15 NOTE — PROGRESS NOTES
Progress Note      PCP: Young Bonilla MD    Date of Admission: 3/10/2023    Chief Complaint: SOB    Hospital Course:     Matthew Conley is a 39-year-old male with past medical history of COPD, systolic heart failure, ESRD on HD, W, F, CAD, s/p TAVR, A-fib on Eliquis, history of CVA who presented to [facility] ED for shortness of breath. Patient arrived via EMS with O2 sats in route between 60s and 70s. Patient was started on BiPAP and given DuoNebs before arrival.  On ED presentation, WBC 16.3, pH 7.32, BNP 70,000, lactate 2.0, troponin 0.34. At this time, patient was meeting sepsis criteria and was found to have possible left-sided pneumonia on CXR. Patient was initially started on IV cefepime and subsequently transitioned to IV ceftriaxone and p.o. azithromycin. Blood culture returned +1 of 2 for Staph epidermidis. Infectious disease was consulted for the validity of this measure. Due to patient's increased BNP as well as vascular congestion and pleural effusion noted on CXR, patient had repeat echo on 3/13. Patient's home Entresto and metoprolol were continued throughout this hospitalization. Patient is not on diuretics chronically. Subjective:      Patient evaluated at bedside this AM.  Patient reported resolution of nausea/emesis. He continues to have profuse watery diarrhea occurring several times throughout the day. He denies fevers/chills. No odor with diarrhea noted.     Medications:  Reviewed    Infusion Medications    sodium chloride      sodium chloride      sodium chloride      nitroGLYCERIN      dextrose       Scheduled Medications    [START ON 3/16/2023] amoxicillin-clavulanate  1 tablet Oral Daily    insulin lispro  0-16 Units SubCUTAneous TID WC    insulin glargine  5 Units SubCUTAneous Nightly    DULoxetine  60 mg Oral Daily    azithromycin  500 mg Oral Daily    sevelamer  1,600 mg Oral TID WC    sodium chloride flush  5-40 mL IntraVENous 2 times per day    vancomycin  15 mg/kg IntraVENous Once    sodium chloride flush  5-40 mL IntraVENous 2 times per day    apixaban  2.5 mg Oral BID    clopidogrel  75 mg Oral Daily    isosorbide dinitrate  20 mg Oral TID    metoprolol succinate  100 mg Oral BID    pantoprazole  40 mg Oral QAM AC    pravastatin  40 mg Oral Daily    QUEtiapine  50 mg Oral Nightly    lidocaine 1 % injection  5 mL IntraDERmal Once    sodium chloride flush  5-40 mL IntraVENous 2 times per day    b complex-C-folic acid  1 capsule Oral Daily    sacubitril-valsartan  1 tablet Oral BID     PRN Meds: ondansetron, oxyCODONE **OR** acetaminophen, loperamide, sodium chloride flush, sodium chloride, sodium chloride flush, sodium chloride, ipratropium-albuterol, sodium chloride flush, sodium chloride, perflutren lipid microspheres, heparin (porcine), glucose, dextrose bolus **OR** dextrose bolus, glucagon (rDNA), dextrose      Intake/Output Summary (Last 24 hours) at 3/15/2023 1138  Last data filed at 3/15/2023 0506  Gross per 24 hour   Intake 240 ml   Output 0 ml   Net 240 ml       Physical Exam Performed:    BP (!) 143/76   Pulse 69   Temp 97.6 °F (36.4 °C)   Resp 16   Ht 5' 9\" (1.753 m)   Wt 220 lb 14.4 oz (100.2 kg) Comment: pt refused after education of importance  SpO2 95%   BMI 32.62 kg/m²     General appearance: No apparent distress, appears stated age and cooperative. HEENT: Pupils equal, round, and reactive to light. Conjunctivae/corneas clear. Neck: Supple, with full range of motion. No jugular venous distention. Trachea midline. Respiratory:  Normal respiratory effort. Diminished breath sounds on left side. Cardiovascular: Regular rate and rhythm with normal S1/S2 without murmurs, rubs or gallops. Abdomen: Soft, non-tender, non-distended with normal bowel sounds. Musculoskeletal: No clubbing, cyanosis or edema bilaterally. Full range of motion without deformity. Skin: Skin color, texture, turgor normal.  No rashes or lesions.   Neurologic:  Neurovascularly intact without any focal sensory/motor deficits. Cranial nerves: II-XII intact, grossly non-focal.  Psychiatric: Alert and oriented, thought content appropriate, normal insight  Capillary Refill: Brisk,3 seconds, normal   Peripheral Pulses: +2 palpable, equal bilaterally       Labs:   Recent Labs     03/13/23  1825 03/14/23  0447 03/15/23  0813   WBC 12.8* 11.6* 11.6*   HGB 11.4* 11.7* 12.0*   HCT 35.4* 36.2* 37.1*    258 248     Recent Labs     03/13/23  1825 03/14/23  0447 03/15/23  0813   * 130* 126*   K 5.2* 4.3 5.4*   CL 86* 90* 87*   CO2 19* 20* 19*   * 65* 84*   CREATININE 8.3* 6.4* 8.3*   CALCIUM 8.6 8.6 8.9   PHOS 10.0* 7.1* 10.9*     No results for input(s): AST, ALT, BILIDIR, BILITOT, ALKPHOS in the last 72 hours. No results for input(s): INR in the last 72 hours. No results for input(s): [place] in the last 72 hours. Urinalysis:      Lab Results   Component Value Date/Time    NITRU Negative 12/25/2022 05:00 AM    WBCUA 6-9 12/25/2022 05:00 AM    BACTERIA Rare 12/25/2022 05:00 AM    RBCUA 3-4 12/25/2022 05:00 AM    BLOODU SMALL 12/25/2022 05:00 AM    SPECGRAV 1.020 12/25/2022 05:00 AM    GLUCOSEU 500 12/25/2022 05:00 AM       Radiology:  XR CHEST PORTABLE   Final Result   Pulmonary vascular congestion. Left pleural effusion which appears partially loculated. Stable cardiomegaly. XR CHEST (2 VW)   Final Result   Findings most compatible pulmonary edema, which appears increased when   compared to the previous exam.  With that said, pneumonia remains in the   differential.         XR CHEST PORTABLE   Final Result   Left pleural effusion. Mild vascular congestion. Consolidative airspace   disease in the left lung base which could represent infiltrate or mass. Follow up to resolution is suggested.                  Assessment/Plan:    Active Hospital Problems    Diagnosis     Acute on chronic systolic HF (heart failure) (HCC) [I50.23]      Priority: High    CAD in native artery [I25.10]      Priority: High    Positive blood culture [R78.81]      Priority: Medium    Elevated troponin [R77.8]      Priority: Medium    Severe sepsis (HCC) [A41.9, R65.20]      Priority: Medium     Sepsis secondary to LLL pneumonia  - SIRS criteria met on admission: WBC 16.3, lactate 2.0, tachycardic 113, RR 24  - Clinical sepsis resolved  - On ceftriaxone IV and po azithromycin, switch to po augmentin in place of ceftriaxone on 3/15  - Initial BC positive 1/2 with staph epidermidis, possible contamination  - ID consulted, agree with low suspicion of true bacteremia  - Repeat BC ordered, awaiting results  - WBC down trending, 11.6 on 3/14  - Afebrile throughout hospitalization    Acute hypoxic respiratory failure  - Baseline 3-3.5 L NC  - O2 Sat 60s-70s per EMS, intially on Bipap  - pH 7.32 on admission  - Weaned to 3L on 3/15  - Will continue to titrate to baseline    Chronic systolic heart failure  - BNP: >70,000 on admission (this appears to be consistent with prior admissions per chart review)  - Repeat ECHO 3/13: EF 25-30%, unchanged from 12/9/22  - Daily weights (baseline 102 kg?)  - Strict I/Os  - Heart failure RN evaluated  - CXR 3/14: L pleural effusion with possible loculation and pulmonary vascular congestion  - Continue Entresto and metoprolol    ESRD on HD M, W, F  - Cr 6.4 on 3/14  - Dialysis 3/13: -3,000 mL, repeat today  - Per patient, has been on dialysis for ~3 years    Emesis/diarrhea  - Per patient, 3 day history of diarrhea   - Suspect related to antibiotic administration although will workup for c diff. , switched abx to po  - Afebrile  - Zofran and imodium available PRN  - C diff ordered today    Elevated troponin  - Troponin 0.34 on admission  - EKG without concern for ischemia  - Cardiology evaluated, likely secondary to supply/demand mismatch    Suicidal ideation  - Noted on 3/13 AM with bedside conversation   - Psych consulted, increased cymbalta to 60mg  - Palliative care consulted for [facility]    T2DM  - POC BG per protocol  - Increased to high dose corrective scale and 5 units lantus at bedtime  - Improved BG to 157 this AM with 5 U    A fib  - HR:69  - Continue Eliquis, rate control with metoprolol    CAD, s/p TAVR  - Continue pravastatin, plavix    DVT Prophylaxis: Eliquis  Diet: ADULT ORAL NUTRITION SUPPLEMENT; Breakfast, Dinner, Lunch; Diabetic Oral Supplement  ADULT DIET; Regular; 4 carb choices (60 gm/meal); No Added Salt (3-4 gm); Low Potassium (Less than 3000 mg/day);  Low Phosphorus (Less than 1000 mg); 1000 ml  Code Status: Limited    PT/OT Eval Status: ordered 3/15    Dispo - possible tomorrow if C diff negative/diarrhea improves    Dede Messina MD

## 2023-03-15 NOTE — PLAN OF CARE
Patient's EF (Ejection Fraction) is less than 40%    Heart Failure Medications:  Diuretics[de-identified] None    (One of the following REQUIRED for EF </= 40%/SYSTOLIC FAILURE but MAY be used in EF% >40%/DIASTOLIC FAILURE)        ACE[de-identified] None        ARB[de-identified] None         ARNI[de-identified] Sacubitril/Valsartan-Entresto    (Beta Blockers)  NON- Evidenced Based Beta Blocker (for EF% >40%/DIASTOLIC FAILURE): None    Evidenced Based Beta Blocker::(REQUIRED for EF% <40%/SYSTOLIC FAILURE) Metoprolol SUCCinate- Toprol XL  . .................................................................................................................................................. Patient's weights and intake/output reviewed: Yes    Patient's Last Weight: 99.2 kg obtained by standing scale. Difference of 0 lbs less than last documented weight.       Intake/Output Summary (Last 24 hours) at 3/15/2023 0443  Last data filed at 3/14/2023 2042  Gross per 24 hour   Intake 240 ml   Output 0 ml   Net 240 ml       Daily Weight log at bedside and being used: [employer] Provided: yes    Comorbidities Reviewed Yes    Patient has a past medical history of Ambulatory dysfunction, Aortic stenosis, Arthritis, Asthma, Bilateral hilar adenopathy syndrome, CAD (coronary artery disease), Cardiomyopathy ([address] CHF (congestive heart failure) ([address] Chronic pain, COPD (chronic obstructive pulmonary disease) ([address] Depression, Diabetes mellitus ([address] Difficult intravenous access, Emphysema of lung ([address] ESRD (end stage renal disease) on dialysis ([address] Fear of needles, Gastric ulcer, GERD (gastroesophageal reflux disease), Heart valve problem, Hemod[facility] patient ([facility] History of spinal fracture, Hx of blood clots, Hyperlipidemia, Hypertension, MI (myocardial infarction) ([facility] Neuromuscular disorder ([facility] Numbness and tingling in left arm, Pneumonia, PONV (postoperative nausea and vomiting), Prolonged emergence from general anesthesia, Sleep apnea, Stroke ([facility] TIA (transient ischemic attack), and Unspecified diseases of blood and blood-forming organs. >>For CHF and Comorbidity documentation on Education Time and Topics, please see Education Tab    Progressive Mobility Assessment:  What is this patient's Current Level of Mobility?: Ambulatory-Up Ad Magalis  How was this patient Mobilized today?:  Up to Toilet/Shower, Up in Room, Up in [place], Unable to Mobilize, and Patient Refuses to Mobilize, ambulated 10 ft                 With Whom? Self                 Level of Difficulty/Assistance: Independent     Pt resting in bed at this time on BiPAP. Pt denies shortness of breath. Pt with nonpitting lower extremity edema.      Patient and/or Family's stated Goal of Care this Admission: increase activity tolerance, better understand heart failure and disease management, and be more comfortable prior to discharge        :

## 2023-03-15 NOTE — PROGRESS NOTES
Department of Internal Medicine  Nephrology Progress Note        SUBJECTIVE:    We are following this patient for ESRD management. HD later today  HD on 3/13 for 2 hours 40 minutes with 2.6 L net UF, no weight available    ROS: No fever or chills. Social: No family at bedside. Physical Exam:    VITALS:  BP (!) 143/76   Pulse 69   Temp 97.6 °F (36.4 °C)   Resp 16   Ht 5' 9\" (1.753 m)   Wt 220 lb 14.4 oz (100.2 kg) Comment: pt refused after education of importance  SpO2 95%   BMI 32.62 kg/m²     General appearance: Seems comfortable, no acute distress. Neck: Trachea midline, thyroid normal.   Lungs:  Non labored breathing, CTA to anterior auscultation. Heart:  S1S2 normal, rub or gallop. No peripheral edema. Abdomen: Soft, non-tender, no organomegaly. Skin: No lesions or rashes, warm to touch.      DATA:    CBC with Differential:    Lab Results   Component Value Date/Time    WBC 11.6 03/15/2023 08:13 AM    RBC 4.05 03/15/2023 08:13 AM    HGB 12.0 03/15/2023 08:13 AM    HCT 37.1 03/15/2023 08:13 AM     03/15/2023 08:13 AM    MCV 91.6 03/15/2023 08:13 AM    MCH 29.6 03/15/2023 08:13 AM    MCHC 32.4 03/15/2023 08:13 AM    RDW 17.3 03/15/2023 08:13 AM    SEGSPCT 73.4 05/17/2013 06:50 AM    BANDSPCT 2 03/15/2023 08:13 AM    METASPCT 2 03/03/2022 07:41 AM    LYMPHOPCT 15.0 03/15/2023 08:13 AM    MONOPCT 10.0 03/15/2023 08:13 AM    MYELOPCT 3 03/15/2023 08:13 AM    BASOPCT 0.0 03/15/2023 08:13 AM    MONOSABS 1.2 03/15/2023 08:13 AM    LYMPHSABS 2.0 03/15/2023 08:13 AM    EOSABS 0.3 03/15/2023 08:13 AM    BASOSABS 0.0 03/15/2023 08:13 AM    DIFFTYPE Auto 05/17/2013 06:50 AM     BMP:    Lab Results   Component Value Date/Time     03/15/2023 08:13 AM    K 5.4 03/15/2023 08:13 AM    K 5.4 03/10/2023 09:20 AM    CL 87 03/15/2023 08:13 AM    CO2 19 03/15/2023 08:13 AM    BUN 84 03/15/2023 08:13 AM    LABALBU 3.8 03/15/2023 08:13 AM    CREATININE 8.3 03/15/2023 08:13 AM    CALCIUM 8.9 03/15/2023 08:13 AM    GFRAA 10 10/05/2022 11:53 AM    GFRAA >60 05/17/2013 06:50 AM    LABGLOM 7 03/15/2023 08:13 AM    GLUCOSE 190 03/15/2023 08:13 AM   Summary    The left ventricular systolic function is severely reduced with an ejection    fraction of 25-30 %. There is hypokinesis of the inferior, basal inferior, apex, apical anterior    and anterolateral walls. Left ventricular cavity size is mildly dilated with normal left ventricular    wall thickness. Grade I diastolic dysfunction with normal filling pressure. No changes noted from previous echo on 12-9-2022 in left ventricular    function. Mild mitral and tricuspid regurgitation. Right ventricular systolic function is mildly reduced . Systolic pulmonic artery pressure (SPAP) is estimated at 40 mmHg consistent    with mild pulmonary hypertension (Right atrial pressure of 3 mmHg). IMPRESSION/RECOMMENDATIONS:      1- ESRD: We will continue hemodialysis per MWF schedule. Next HD on 3/15   2- Hyponatremia: Apply fluid restriction and attempt further UF. 3- Hyperkalemia: Mild, will apply low potassium diet. 4- HTN: Blood pressure within acceptable range. 5- Anemia: Hemoglobin above target for ESRD, hold DAVON and monitor. 6- Shortness of breath: Improving with further Ultrafiltration during dialysis.   7- staph epi bacteremia ( 1/2 sets)    Will follow ID recs --> recommending no antibiotics and repeat blood cultures drawn via HD cath on 3/14

## 2023-03-15 NOTE — PROGRESS NOTES
03/14/23 2327   NIV Type   Skin Assessment Clean, dry, & intact   Skin Protection for O2 Device No  (Patient refused)   NIV Started/Stopped On   Equipment Type V60   Mode Bilevel   Mask Type Full face mask   Mask Size Large   Settings/Measurements   PIP Observed 10 cm H20   IPAP 10 cmH20   CPAP/EPAP 5 cmH2O   Vt (Measured) 578 mL   Rate Ordered 10   Resp 14   FiO2  35 %   I Time/ I Time % 1 s   Minute Volume (L/min) 8.1 Liters   Mask Leak (lpm) 71 lpm   Comfort Level Good   Using Accessory Muscles No   SpO2 96   Alarm Settings   Alarms On Y   Low Pressure (cmH2O) 6 cmH2O   High Pressure (cmH2O) 30 cmH2O   Apnea (secs) 20 secs   RR Low (bpm) 6   RR High (bpm) 40 br/min   Oxygen Therapy/Pulse Ox   O2 Device PAP (positive airway pressure)   Heart Rate 60   SpO2 96 %

## 2023-03-15 NOTE — PROGRESS NOTES
03/14/23 2156   NIV Type   Skin Assessment Clean, dry, & intact   Skin Protection for O2 Device No  (Patient refused)   Location Nose   NIV Started/Stopped On   Equipment Type V60   Mode Bilevel   Mask Type Full face mask   Mask Size Large   Settings/Measurements   PIP Observed 12 cm H20   IPAP 10 cmH20   CPAP/EPAP 5 cmH2O   Vt (Measured) 779 mL   Rate Ordered 10   Resp 18   FiO2  35 %   I Time/ I Time % 1 s   Minute Volume (L/min) 17.6 Liters   Mask Leak (lpm) 42 lpm   Comfort Level Good   Using Accessory Muscles No   SpO2 97   Alarm Settings   Alarms On Y   Low Pressure (cmH2O) 6 cmH2O   High Pressure (cmH2O) 30 cmH2O   Apnea (secs) 20 secs   RR Low (bpm) 6   RR High (bpm) 40 br/min   Oxygen Therapy/Pulse Ox   O2 Device (S)  PAP (positive airway pressure)   Heart Rate 62   SpO2 97 %

## 2023-03-15 NOTE — PROGRESS NOTES
Occupational Therapy  Facility/Department: [address] [place], [place] [address]  Occupational Therapy Initial Assessment/Treatment/Discharge Summary  1 x only    Name: Maribel Calderon  : 1970  MRN: [id number]  Date of Service: 3/15/2023    Discharge Recommendations:  Home with assist PRN          Patient Diagnosis(es): The primary encounter diagnosis was Pneumonia due to infectious organism, unspecified laterality, unspecified part of lung. Diagnoses of Acute respiratory failure, unspecified whether with hypoxia or hypercapnia ([address] Septicemia ([address] Anemia, unspecified type, and Elevated troponin were also pertinent to this visit. Past Medical History:  has a past medical history of Ambulatory dysfunction, Aortic stenosis, Arthritis, Asthma, Bilateral hilar adenopathy syndrome, CAD (coronary artery disease), Cardiomyopathy ([address] CHF (congestive heart failure) ([address] Chronic pain, COPD (chronic obstructive pulmonary disease) ([address] Depression, Diabetes mellitus ([address] Difficult intravenous access, Emphysema of lung ([address] ESRD (end stage renal disease) on dialysis ([address] Fear of needles, Gastric ulcer, GERD (gastroesophageal reflux disease), Heart valve problem, Hemodialysis patient ([address] History of spinal fracture, Hx of blood clots, Hyperlipidemia, Hypertension, MI (myocardial infarction) ([address] Neuromuscular disorder ([address] Numbness and tingling in left arm, Pneumonia, PONV (postoperative nausea and vomiting), Prolonged emergence from general anesthesia, Sleep apnea, Stroke ([address] TIA (transient ischemic attack), and Unspecified diseases of blood and blood-forming organs. Past Surgical History:  has a past surgical history that includes Tonsillectomy; cyst removal (2013); Colonoscopy; Coronary angioplasty with stent (05/26/15); vascular surgery (aprx 2 years ago); Colonoscopy (); Upper gastrointestinal endoscopy (2016);  Upper gastrointestinal endoscopy (2017); other surgical history (2017); Dialysis fistula creation (Left, 10/30/2017); Diagnostic Cardiac Cath Lab Procedure; other surgical history (2018); other surgical history (Bilateral, 06/26/2018); pr laps insertion tunneled intraperitoneal catheter (N/A, 9/21/2018); other surgical history (Right, 09/2018); Dialysis fistula creation (Left, 3/27/2019); other surgical history (05/28/2019); Endoscopy, colon, diagnostic; Catheter Removal (Right, 7/2/2019); Aortic valve replacement (N/A, 10/15/2019); Rectal surgery (N/A, 2/24/2022); IR TUNNELED CVC PLACE WO SQ PORT/PUMP > 5 YEARS (3/21/2022); IR TUNNELED CVC PLACE WO SQ PORT/PUMP > 5 YEARS (4/21/2022); IR TUNNELED CVC PLACE WO SQ PORT/PUMP > 5 YEARS (4/26/2022); IR TUNNELED CVC PLACE WO SQ PORT/PUMP > 5 YEARS (6/23/2022); and thoracentesis (N/A, 10/2/2022). Assessment   Performance deficits / Impairments: Decreased endurance  Pt admitted from home rather IPTA with walker, pt educated on CHF OT topics. QUENTIN for mobilty, transfers and ADLS. No further OT needs, will sign off.    Prognosis: Good  Decision Making: Low Complexity  REQUIRES OT FOLLOW-UP: No  Activity Tolerance  Activity Tolerance: Patient Tolerated treatment well        Plan   Occupational Therapy Plan  Times Per Week: 1x only     Restrictions  Restrictions/Precautions  Restrictions/Precautions: Fall Risk, Up as Tolerated    Subjective   General  Chart Reviewed: Yes, Orders, Progress Notes, History and Physical, Previous Admission, Imaging, Labs  Patient assessed for rehabilitation services?: Yes  Family / Caregiver Present: No  Referring Practitioner: Lisette Bynum MD 3/15/23  Diagnosis: Sepsis secondary to LLL pneumonia, chronic CHF     Social/Functional History  Social/Functional History  Lives With: Spouse  Type of Home: Mobile home  Home Layout: One level  Home Access: Ramped entrance  Bathroom Shower/Tub: Walk-in shower  Bathroom Toilet: Standard  Home Equipment: Walker, [address] bed, Rollator, Lift chair (3L O2 continously)  Has the patient had two or more falls in the past year or any fall with injury in the past year?: Yes (dozen, \"I get up to fast and fall, I'm light headed\")  ADL Assistance: Needs assistance  Bath: Modified independent  Dressing: Modified independent  Grooming: Independent  Feeding: Independent  Toileting: Independent  Ambulation Assistance: Independent (with rollator, RW or scooter. \"whatever is close\")  Transfer Assistance: Independent  Active : Yes  Occupation: On disability       Objective   Heart Rate: 58  Heart Rate Source: Apical  BP: (!) 101/58  BP Location: Right upper arm  BP Method: Automatic  Patient Position: Semi fowlers  MAP (Calculated): 72  Resp: 18  SpO2: 100 %  O2 Device: Nasal cannula             Safety Devices  Type of Devices: Gait belt;Left in bed;Nurse notified;Call light within reach    Bed Mobility Training  Bed Mobility Training: Yes  Supine to Sit: Modified independent; Other (comment)  Sit to Supine:  (EOB)    Balance  Sitting: Intact  Standing: With support  Transfer Training  Transfer Training: Yes  Sit to Stand: Supervision  Stand to Sit: Supervision    Gait  Overall Level of Assistance: Supervision  Interventions: Safety awareness training  Distance (ft): 50 Feet  Assistive Device: Walker, rolling;Gait belt     AROM: Within functional limits  PROM: Within functional limits  Strength: Within functional limits  Coordination: Within functional limits  Tone: Normal  Sensation: Intact      Cognition  Overall Cognitive Status: WFL  Orientation  Overall Orientation Status: Within Functional Limits      Education Given To: Patient  Education Provided: Role of Therapy; ADL Adaptive Strategies;Transfer Training;Plan of Care;Equipment; Energy Conservation  Education Provided Comments: Disease specific: CHF  Education Method: Demonstration;Verbal;Teach Back;Printed Information/Hand-outs  Barriers to Learning: None  Education Outcome: Verbalized understanding;Demonstrated understanding         OCCUPATIONAL THERAPY HEART FAILURE EDUCATION    Jf Cohen    : 1970  Acct #: [de-identified]  MRN: [id number]  PHYSICIAN:  Rowena Wolf MD    OT Heart Failure Education Goals    Patient educated and demonstrates /verbalizes appropriate teach back with ability to self rate on RILEY and identify HF activity zone, prior to initiation of ADLs to appropriately determine need for daily activity level/ energy conservation/pacing. [x] Goal Met, [] Goal Not Met    Patient demonstrates /[id number] understanding of appropriate employment of Energy Conservation with every day activity. [x] Goal Met, [] Goal Not Met    Patient demonstrates/verbalizes ability to correctly identify mL to cups conversion, what constitutes FLUID in diet, and knowledge of when to communicate changes in weight to physician consistent with patient orders and HF education.      [x] Goal Met, [] Goal Not Met, [] Goal not appropriate for pt     Pt voices and demonstrates appropriate teach back of Heart Failure Education Program with the use of:  [x] Energy Conservation techniques                              [x] Choosing daily activity level  [x] Importance of fluid restriction               Pt will benefit from reinforcement of education due to   [] Readiness to learn                               [] Decreased cognition  [] Language barrier                                  [] Decreased vision/hearing  [] First introduction to new information      [] Current Living (LTC, SNF, etc)  []Other:    Education provided via:  [x] Oral instruction  [x] Demonstration  [x] Written handout    ------------------------------------------------------------------------------------------------------------------------------------                    1)Heart Failure Zones Self Check Plan referencing Red/Yellow/Green Light Symbol with:  [] Green Zone/All Clear:   physical activity is normal for you,   No new swelling in feet, ankles, legs or stomach,   No weight gain of more than 2-3 pounds,   No chest pain or worsening of shortness of breath. (Continue daily: weight check, meds as directed, low salt eating, monitoring of fluid intake, balance activity, follow up visits)  [x] Yellow Zone/Caution:   Increased cough or shortness of breath with activity  Increased swelling in your feet, ankles, legs or stomach from baseline  Weight gain or loss of more than 2-3 pounds in 1 day. Increase in the number of pillows needed while sleeping  (Check In!: You need to contact your doctor or provider as soon as possible)  [] Red Zone/Medical Alert:  Unrelieved chest pain or shortness of breath, especially while resting  Increased Discomfort or swelling in the abdomen or lower body  Sudden weight gain of more than 5 pounds in a week  Increased cough with bubbly and/or pink sputum  (Warning: You need to be seen right away . If you cannot reach your physician, call 911)    Patient educated in and [x]demonstrated/[x]verbalized understanding of identifying HF activity zone with the Self Check Plan referencing Red/Yellow/Green Light Symbol. *prior to ADL's/activity  to appropriately determine daily activity level*  ------------------------------------------------------------------------------------------------------------------------------------         2)John Rating of Perceived Exertion (RPE) Scale     The John rating scale ranges from 6 to 20, where 6 means \"no exertion at all\" and 20 means \"maximal exertion. \" The patient chooses the number that best describes their level of exertion. This will objectify the intensity level of the patient's activity, and the therapist can use this information to accelerate or decelerate activity levels to reach the desired range. The patient should appraise their feeling of exertion as honestly as possible, without thinking about what the actual physical load is.  Their feeling of effort and exertion is important, and it should not be compared to the level of others. John RPE Scale  Activity Completed: mobility    [] 6  No exertion at all  [] 7  Extremely light  [] 8  [x] 9  Very light (For a healthy person, it is like walking slowly at his or her own pace for some minutes)  []10  []11  Light  []12  []13  Somewhat hard (exercise, but it still feels OK to continue)  []14  []15  Hard (heavy)  []16  []17  Very hard (can still go on, but really has to push himself. It feels very heavy, and the person is very tired.)  []18  []19  Extremely hard (For most people this is the most strenuous exercise they have ever experienced.)  []20  Maximal exertion. Patient educated in and []demonstrated/[]verbalized understanding []teach back  []Rating self on JOHN and   []Identifying HF activity zone with the Self Check Plan referencing Red/Yellow/Green Light Symbol *:prior to ADls/activity to appropriately determine daily activity level.    ------------------------------------------------------------------------------------------------------------------------------------         3) 5 P's of Energy Conservation    Pt was educated on the following aspects of Energy Conservation techniques. Prioritize/Plan: Decide what needs to be done today, and what can wait for a later date, write to do lists, Plan ahead to avoid extra trips, Gather supplies and equipment needed before starting an activity. Position: Avoid tiring and awkward posture that may impair breathing  Pace: Slow and steady pace, never rushing! Pursed lip breathing.   Pursed Lip Breathing: \"Smell the roses, blow out the candles\"    Patient [x]demonstrates / [x]verbalizes understanding of appropriate employment of Energy Conservation with every day activity.  ------------------------------------------------------------------------------------------------------------------------------------         4) Fluid intake tracking    Patient  [x]demonstrates/[x]verbalizes ability to correctly identify mL to cups conversion, what constitutes FLUID in diet, and  knowledge of when to  communicate changes in weight to physician consistent with patient orders and HF education.       Pt participated in the following fluid tracking management   [] Identifying 4, 8, and 12 ounces of fluid size  [] Identify mL to cup conversion  [] Understanding Jello, watermelon and Ice cream contributing to fluid intake   [x] Acknowledge current fluid restriction limits/orders  Pt required assistance or correction of error in the following:   [] Not appropriate for patient  ----------------------------------------------------------------------------------------          AM-PAC Score     AM-PAC Inpatient Daily Activity Raw Score: 24 (03/15/23 1221)  AM-PAC Inpatient ADL T-Scale Score : 57.54 (03/15/23 1221)  ADL Inpatient CMS 0-100% Score: 0 (03/15/23 1221)  ADL Inpatient CMS G-Code Modifier : [address] (03/15/23 1221)     Goals  Short Term Goals  Time Frame for Short Term Goals: 1 session  Short Term Goal 1: Pt to voice understanding with teach-back of 1/3 heart failure goals-stg met 3/15/23  Short Term Goal 2: Pt will complete functional transfers and ADLS with supervision-stg met 3/15/23  Patient Goals   Patient goals : \"to return home\"       Therapy Time   Individual Concurrent Group Co-treatment   Time In 1124         Time Out 1203         Minutes 39         Timed Code Treatment Minutes: [address] [facility], OTR/L

## 2023-03-15 NOTE — PROGRESS NOTES
Physical Therapy  Facility/Department: [facility]  Physical Therapy Initial Assessment/Treatment     Name: Solo Coates  : 1970  MRN: [id number]  Date of Service: 3/15/2023    Discharge Recommendations:  Home with assist PRN   PT Equipment Recommendations  Equipment Needed: No      Patient Diagnosis(es): The primary encounter diagnosis was Pneumonia due to infectious organism, unspecified laterality, unspecified part of lung. Diagnoses of Acute respiratory failure, unspecified whether with hypoxia or hypercapnia ([address] Septicemia ([address] Anemia, unspecified type, and Elevated troponin were also pertinent to this visit. Past Medical History:  has a past medical history of Ambulatory dysfunction, Aortic stenosis, Arthritis, Asthma, Bilateral hilar adenopathy syndrome, CAD (coronary artery disease), Cardiomyopathy ([address] CHF (congestive heart failure) ([address] Chronic pain, COPD (chronic obstructive pulmonary disease) ([address] Depression, Diabetes mellitus ([address] Difficult intravenous access, Emphysema of lung ([address] ESRD (end stage renal disease) on dialysis ([address] Fear of needles, Gastric ulcer, GERD (gastroesophageal reflux disease), Heart valve problem, Hemodialysis patient ([address] History of spinal fracture, Hx of blood clots, Hyperlipidemia, Hypertension, MI (myocardial infarction) ([address] Neuromuscular disorder ([address] Numbness and tingling in left arm, Pneumonia, PONV (postoperative nausea and vomiting), Prolonged emergence from general anesthesia, Sleep apnea, Stroke ([address] TIA (transient ischemic attack), and Unspecified diseases of blood and blood-forming organs. Past Surgical History:  has a past surgical history that includes Tonsillectomy; cyst removal (2013); Colonoscopy; Coronary angioplasty with stent (05/26/15); vascular surgery (aprx 2 years ago); Colonoscopy (); Upper gastrointestinal endoscopy (2016);  Upper gastrointestinal endoscopy (2017); other surgical history (02/01/2017); Dialysis fistula creation (Left, 10/30/2017); Diagnostic Cardiac Cath Lab Procedure; other surgical history (2018); other surgical history (Bilateral, 06/26/2018); pr laps insertion tunneled intraperitoneal catheter (N/A, 9/21/2018); other surgical history (Right, 09/2018); Dialysis fistula creation (Left, 3/27/2019); other surgical history (05/28/2019); Endoscopy, colon, diagnostic; Catheter Removal (Right, 7/2/2019); Aortic valve replacement (N/A, 10/15/2019); Rectal surgery (N/A, 2/24/2022); IR TUNNELED CVC PLACE WO SQ PORT/PUMP > 5 YEARS (3/21/2022); IR TUNNELED CVC PLACE WO SQ PORT/PUMP > 5 YEARS (4/21/2022); IR TUNNELED CVC PLACE WO SQ PORT/PUMP > 5 YEARS (4/26/2022); IR TUNNELED CVC PLACE WO SQ PORT/PUMP > 5 YEARS (6/23/2022); and thoracentesis (N/A, 10/2/2022). Assessment   Assessment: Pt to [facility] with diagnosis of severe sepsis. PTA pt lives in mobile home with wife with a ramped entrance. Pt was independent with transfers and household distance ambulation with RW, uses electric scooter in community. Pt currently presenst near baseline function and is able to complete bed mobility, transfers, and ambulation with RW and mod I to supervision. Pt does not require skilled PT services at this time. Pt is safe to DC home with assist PRN when deemed medically appropriate. Seen as co-eval with OT in order to safely assess highest level of function and to optimize functional outcomes. Therapy Prognosis: Fair  Decision Making: Low Complexity  Barriers to Learning: poor motivation  No Skilled PT:  At baseline function  Requires PT Follow-Up: No  Activity Tolerance  Activity Tolerance: Patient tolerated treatment well     Plan   Physcial Therapy Plan  General Plan: Discharge with evaluation only  Safety Devices  Type of Devices: Gait belt, Left in bed, Nurse notified, Call light within reach, Bed alarm in place  Restraints  Restraints Initially in Place: No Restrictions  Restrictions/Precautions  Restrictions/Precautions: Fall Risk, Up as Tolerated  Required Braces or Orthoses?: No     Subjective   General  Chart Reviewed: Yes  Patient assessed for rehabilitation services?: Yes  Response To Previous Treatment: Not applicable  Family / Caregiver Present: No  Referring Practitioner: Abhi Sosa MD  Referral Date : 03/15/23  Diagnosis: severe sepsis  Follows Commands: Within Functional Limits  General Comment  Comments: pt in bed upon arrival, RN cleared for therapy  Subjective  Subjective: pt pleasant and agreeable to therapy eval         Social/Functional History  Social/Functional History  Lives With: Spouse  Type of Home: Mobile home  Home Layout: One level  Home Access: Ramped entrance  Bathroom Shower/Tub: Walk-in shower  Bathroom Toilet: Standard  Home Equipment: Nidia Teja, rolling, Hospital bed, Rollator, Lift chair (3L O2 continously)  Has the patient had two or more falls in the past year or any fall with injury in the past year?: Yes (dozen, \"I get up to fast and fall, I'm light headed\")  ADL Assistance: Needs assistance  Bath: Modified independent  Dressing: Modified independent  Grooming: Independent  Feeding: Independent  Toileting: Independent  Ambulation Assistance: Independent (with rollator, RW or scooter.  \"whatever is close\")  Transfer Assistance: Independent  Active : Yes  Occupation: On disability    Vision/Hearing  Vision  Vision: Impaired  Vision Exceptions: Wears glasses for reading  Hearing  Hearing: Within functional limits      Cognition   Orientation  Overall Orientation Status: Within Functional Limits  Cognition  Overall Cognitive Status: WFL     Objective   Heart Rate: 58  Heart Rate Source: Apical  BP: (!) 101/58  BP Location: Right upper arm  BP Method: Automatic  Patient Position: Semi fowlers  MAP (Calculated): 72  Resp: 18  SpO2: 100 %  O2 Device: Nasal cannula     Observation/Palpation  Posture: Caterina Briscoe Assessment  AROM: Within functional limits  PROM: Within functional limits  Strength: Within functional limits     Bed Mobility Training  Bed Mobility Training: Yes  Supine to Sit: Modified independent; Other (comment)  Sit to Supine:  (EOB)  Balance  Sitting: Intact  Standing: With support  Transfer Training  Transfer Training: Yes  Sit to Stand: Supervision  Stand to Sit: Supervision  Gait Training  Gait Training: Yes  Gait  Overall Level of Assistance: Supervision  Interventions: Safety awareness training  Base of Support: Widened  Speed/Ольга: Pace decreased (< 100 feet/min); Slow  Step Length: Right shortened;Left shortened  Gait Abnormalities:  (At baseline per pt)  Distance (ft): 50 Feet  Assistive Device: Walker, rolling;Gait belt    Exercise Treatment: CHF exercises 10x each        OutComes Score  AM-PAC Score  AM-PAC Inpatient Mobility Raw Score : 23 (03/15/23 1306)  AM-PAC Inpatient T-Scale Score : 56.93 (03/15/23 1306)  Mobility Inpatient CMS 0-100% Score: 11.2 (03/15/23 1306)  Mobility Inpatient CMS G-Code Modifier : CI (03/15/23 1306)     Goals  Short Term Goals  Time Frame for Short Term Goals: 3/15/23  Short Term Goal 1: pt will complete bed mobility with mod I -- MET 3/15/23  Short Term Goal 2: pt will ambulate 50ft with RW and supervision -- MET 3/15/23  Patient Goals   Patient Goals : \"To go home\"     Education  Patient Education  Education Given To: Patient  Education Provided: Role of Therapy;Plan of Care;Home Exercise Program;Transfer Training  Education Method: Demonstration;Printed Information/Hand-outs  Barriers to Learning:  (poor motivation)  Education Outcome: Verbalized understanding;Demonstrated understanding     PHYSICAL THERAPY HEART FAILURE EDUCATION:  PHYSICAL THERAPY HEART FAILURE EDUCATION GOALS:  1. Identifying HF Activity Zone with the Self Check Plan prior to exercises or walking    Patient educated in and demonstrated/verbalized understanding Identifying HF activity zone with the Self Check Plan referencing Red/Yellow/Green Light Symbol prior to exercises or walking  to appropriately determine daily activity level. 2.Rating self on RILEY scale of perceived exertion   Patient educated in and demonstrated and/or verbalized understanding Rating self on RILEY scale of perceived exertion  3. HF Therapeutic Exercises  Pt educated in and completed Therapeutic exercises (Supine, Seated ,Standing, walking) as indicated below for 1 set) to promote circulation and prevent complications of bedrest with patient verbalizes understanding of employing green zone, yellow zone and red zone to seek provider input and evaluation. 4. Daily Weight check/Stepping on Scale  Pt educated in importance of checking body weight daily. Barriers to completion of Daily weight check are identified with recommendations made or Patient demonstrates and/or verbalizes safety in:stepping up to weight self and complete downward gaze/head nod to read scale on standard scale  and safety stepping off scale. 5. Teach back of Elements of PT HF Education  Pt voices and demonstrates appropriate teach back of elements of Physical Therapy Heart Failure Education Program                        1)Heart Failure Zones Self Check Plan referencing Red/Yellow/Green Light Symbol with:  []Green Zone/All Clear:   physical activity is normal for you,   No new swelling in feet, ankles, legs or stomach,   No weight gain of more than 2-3 pounds,   No chest pain or worsening of shortness of breath. (Continue daily: weight check, meds as directed, low salt eating, monitoring of fluid intake, balance activity, follow up visits)  [x]Yellow Zone/Caution:   Increased cough or shortness of breath with activity  Increased swelling in your feet, ankles, legs or stomach from baseline  Weight gain or loss of more than 2-3 pounds in 1 day.   Increase in the number of pillows needed while sleeping  (Check In!: You need to contact your doctor or provider as soon as possible)  []Red Zone/Medical Alert:  Unrelieved chest pain or shortness of breath, especially while resting  Increased Discomfort or swelling in the abdomen or lower body  Sudden weight gain of more than 5 pounds in a week  Increased cough with bubbly and/or pink sputum  (Warning: You need to be seen right away . If you cannot reach your physician, call 911)    2)John Rating of Perceived Exertion (RPE) Scale     The John rating scale ranges from 6 to 20, where 6 means \"no exertion at all\" and 20 means \"maximal exertion. \" The patient chooses the number that best describes their level of exertion. This will objectify the intensity level of the patient's activity, and the therapist can use this information to accelerate or decelerate activity levels to reach the desired range. The patient should appraise their feeling of exertion as honestly as possible, without thinking about what the actual physical load is. Their feeling of effort and exertion is important, and it should not be compared to the level of others. Patient's should target exercises for very light to moderate (9-11/20) for this phase of their rehab. John RPE Scale    Activity Completed:  Ambulating 50ft with RW     []6  No exertion at all  []7  Extremely light  []8  [] 9  Very light (For a healthy person, it is like walking slowly at his or her own pace for some minutes)  []10  [x]11  Light  []12  []13  Somewhat hard (exercise, but it still feels OK to continue)  []14  []15  Hard (heavy)  []16  []17  Very hard (can still go on, but really has to push himself. It feels very heavy, and the person is very tired. )[]18  []19  Extremely hard (For most people this is the most strenuous exercise they have ever experienced.)  []20  Maximal exertion.     3) Pt educated in and completed Therapeutic exercise (as indicated below for 1 set) to promote circulation and prevent complications of bedrest with patient verbalizes understanding of employing green zone, yellow zone and red zone to seek provider input and evaluation. Educated patient in :  []Supine    Level 1: Bed Exercise 10-15 reps, 2x/day  []Ankle Pumps  []Heel Slides  []Hip Abduction  []Buttocks Squeeze  []Diaphragmatic Breathing with TA set  []Shoulder Shrugs  []Bicep Curl  []Hands open/close                     [x]Sitting    Level 2: Seated Exercises 10-15 reps, 2x/day  [x]Toe raise/heel raise  [x]Long Arc Quad  [x]Seated March  [x]Seated Clamshell  [x]Diaphragmatic Breathing with TA set and BUE ER and IR  []Shoulder Shrugs  []Bicep Curls  []Hands open/close                         []Standing   Level 3: Standing Exercises 10-15 reps, 2x/day     []Sit to/from stand  []Standing march  []Standing side steps  []Standing bilateral heel raise  []Diaphragmatic breathing with TA set and BUE flex/ext  []Shoulder Shrugs  []Bicep Curls  []Hands open/close                        []WalkingLevel 1: Educated patient in initiating walking when in the Green zone and to Start with 2-5 minutes daily and work up to 10 minutes per day. Walk at a slow, comfortable pace with your exertion level Very Light (RILEY 9) to Light (RILEY 11)   You should be able to comfortably hold a conversation while walking. 4)Daily Weight check/Stepping on Scale  []Pt educated in importance of checking body weight daily and []demonstrate/[]verbalizes safety in:stepping up to weigh self and complete downward gaze/head nod to read scale on standard scale  and safety stepping off scale. [x]Barriers to completion of Daily weight check: Does not know where his scale is at home but states he has one somewhere. 5)Pt voices and demonstrates appropriate teach back of Heart Failure Education Program with : use of the   []1. Identifying Appropriate Zone from HF Zone Self-Check Plan                          []2. Rating self on RILEY.  []3. Therapeutic exercise/walking program      []4.  Importance of taking daily weight measurement   []5. Safely stepping on and off scale    []Pt will benefit from reinforcement of education due to   []Readiness to learn                               []Decreased cognition  []Language barrier                                  []Decreased vision/hearing  []First introduction to new information    []Requires intermittent cues  []Other:    Education provided via:  [x]Oral instruction  [x]Demonstration  [x]Written handout    Therapy Time   Individual Concurrent Group Co-treatment   Time In 1124         Time Out 1203         Minutes 39         Timed Code Treatment Minutes: 29 Minutes (10 min eval)        Katalina Tong, PT, DPT

## 2023-03-16 VITALS
OXYGEN SATURATION: 100 % | SYSTOLIC BLOOD PRESSURE: 138 MMHG | TEMPERATURE: 97.7 F | HEART RATE: 62 BPM | RESPIRATION RATE: 18 BRPM | HEIGHT: 69 IN | DIASTOLIC BLOOD PRESSURE: 92 MMHG | WEIGHT: 220.9 LBS | BODY MASS INDEX: 32.72 KG/M2

## 2023-03-16 LAB
ALBUMIN SERPL-MCNC: 3.4 G/DL (ref 3.4–5)
ANION GAP SERPL CALCULATED.3IONS-SCNC: 19 MMOL/L (ref 3–16)
ANISOCYTOSIS BLD QL SMEAR: ABNORMAL
BASOPHILS # BLD: 0 K/UL (ref 0–0.2)
BASOPHILS NFR BLD: 0 %
BUN SERPL-MCNC: 50 MG/DL (ref 7–20)
BURR CELLS BLD QL SMEAR: ABNORMAL
CALCIUM SERPL-MCNC: 8.4 MG/DL (ref 8.3–10.6)
CHLORIDE SERPL-SCNC: 89 MMOL/L (ref 99–110)
CO2 SERPL-SCNC: 18 MMOL/L (ref 21–32)
CREAT SERPL-MCNC: 6 MG/DL (ref 0.9–1.3)
DACRYOCYTES BLD QL SMEAR: ABNORMAL
DEPRECATED RDW RBC AUTO: 17.1 % (ref 12.4–15.4)
EOSINOPHIL # BLD: 0.1 K/UL (ref 0–0.6)
EOSINOPHIL NFR BLD: 1 %
GFR SERPLBLD CREATININE-BSD FMLA CKD-EPI: 10 ML/MIN/{1.73_M2}
GLUCOSE BLD-MCNC: 142 MG/DL (ref 70–99)
GLUCOSE SERPL-MCNC: 163 MG/DL (ref 70–99)
HCT VFR BLD AUTO: 36.5 % (ref 40.5–52.5)
HGB BLD-MCNC: 11.9 G/DL (ref 13.5–17.5)
LYMPHOCYTES # BLD: 0.9 K/UL (ref 1–5.1)
LYMPHOCYTES NFR BLD: 8 %
MACROCYTES BLD QL SMEAR: ABNORMAL
MCH RBC QN AUTO: 29.7 PG (ref 26–34)
MCHC RBC AUTO-ENTMCNC: 32.5 G/DL (ref 31–36)
MCV RBC AUTO: 91.2 FL (ref 80–100)
MONOCYTES # BLD: 1.1 K/UL (ref 0–1.3)
MONOCYTES NFR BLD: 10 %
MYELOCYTES NFR BLD MANUAL: 2 %
NEUTROPHILS # BLD: 9.2 K/UL (ref 1.7–7.7)
NEUTROPHILS NFR BLD: 68 %
NEUTS BAND NFR BLD MANUAL: 11 % (ref 0–7)
PERFORMED ON: ABNORMAL
PHOSPHATE SERPL-MCNC: 6.9 MG/DL (ref 2.5–4.9)
PLATELET # BLD AUTO: 217 K/UL (ref 135–450)
PMV BLD AUTO: 7.6 FL (ref 5–10.5)
POIKILOCYTOSIS BLD QL SMEAR: ABNORMAL
POLYCHROMASIA BLD QL SMEAR: ABNORMAL
POTASSIUM SERPL-SCNC: 4.7 MMOL/L (ref 3.5–5.1)
RBC # BLD AUTO: 4 M/UL (ref 4.2–5.9)
SLIDE REVIEW: ABNORMAL
SODIUM SERPL-SCNC: 126 MMOL/L (ref 136–145)
SPHEROCYTES BLD QL SMEAR: ABNORMAL
WBC # BLD AUTO: 11.3 K/UL (ref 4–11)

## 2023-03-16 PROCEDURE — 85025 COMPLETE CBC W/AUTO DIFF WBC: CPT

## 2023-03-16 PROCEDURE — 2700000000 HC OXYGEN THERAPY PER DAY

## 2023-03-16 PROCEDURE — 36415 COLL VENOUS BLD VENIPUNCTURE: CPT

## 2023-03-16 PROCEDURE — 80069 RENAL FUNCTION PANEL: CPT

## 2023-03-16 PROCEDURE — 6370000000 HC RX 637 (ALT 250 FOR IP): Performed by: INTERNAL MEDICINE

## 2023-03-16 PROCEDURE — 94761 N-INVAS EAR/PLS OXIMETRY MLT: CPT

## 2023-03-16 PROCEDURE — 6370000000 HC RX 637 (ALT 250 FOR IP)

## 2023-03-16 RX ORDER — DULOXETIN HYDROCHLORIDE 60 MG/1
60 CAPSULE, DELAYED RELEASE ORAL DAILY
Qty: 30 CAPSULE | Refills: 3 | Status: CANCELLED | OUTPATIENT
Start: 2023-03-17

## 2023-03-16 RX ORDER — AMOXICILLIN AND CLAVULANATE POTASSIUM 250; 125 MG/1; MG/1
1 TABLET, FILM COATED ORAL DAILY
Qty: 2 TABLET | Refills: 0 | Status: SHIPPED | OUTPATIENT
Start: 2023-03-16 | End: 2023-03-18

## 2023-03-16 RX ORDER — LACTOBACILLUS RHAMNOSUS GG 10B CELL
1 CAPSULE ORAL
Qty: 7 CAPSULE | Refills: 0 | Status: CANCELLED | OUTPATIENT
Start: 2023-03-17

## 2023-03-16 RX ORDER — DULOXETIN HYDROCHLORIDE 60 MG/1
60 CAPSULE, DELAYED RELEASE ORAL DAILY
Qty: 30 CAPSULE | Refills: 3 | Status: SHIPPED | OUTPATIENT
Start: 2023-03-17

## 2023-03-16 RX ORDER — LACTOBACILLUS RHAMNOSUS GG 10B CELL
1 CAPSULE ORAL
Qty: 7 CAPSULE | Refills: 0 | Status: SHIPPED | OUTPATIENT
Start: 2023-03-17

## 2023-03-16 RX ADMIN — NEPHROCAP 1 MG: 1 CAP ORAL at 08:49

## 2023-03-16 RX ADMIN — DULOXETINE HYDROCHLORIDE 60 MG: 60 CAPSULE, DELAYED RELEASE ORAL at 08:49

## 2023-03-16 RX ADMIN — SACUBITRIL AND VALSARTAN 1 TABLET: 24; 26 TABLET, FILM COATED ORAL at 08:49

## 2023-03-16 RX ADMIN — Medication 1 CAPSULE: at 08:50

## 2023-03-16 RX ADMIN — APIXABAN 2.5 MG: 5 TABLET, FILM COATED ORAL at 08:50

## 2023-03-16 RX ADMIN — SEVELAMER CARBONATE 1600 MG: 800 TABLET, FILM COATED ORAL at 08:20

## 2023-03-16 RX ADMIN — OXYCODONE HYDROCHLORIDE 5 MG: 5 TABLET ORAL at 08:50

## 2023-03-16 RX ADMIN — ISOSORBIDE DINITRATE 20 MG: 20 TABLET ORAL at 08:50

## 2023-03-16 RX ADMIN — AZITHROMYCIN MONOHYDRATE 500 MG: 250 TABLET ORAL at 08:50

## 2023-03-16 RX ADMIN — CLOPIDOGREL BISULFATE 75 MG: 75 TABLET ORAL at 08:50

## 2023-03-16 RX ADMIN — PANTOPRAZOLE SODIUM 40 MG: 40 TABLET, DELAYED RELEASE ORAL at 05:18

## 2023-03-16 ASSESSMENT — PAIN SCALES - GENERAL
PAINLEVEL_OUTOF10: 8
PAINLEVEL_OUTOF10: 3
PAINLEVEL_OUTOF10: 8

## 2023-03-16 ASSESSMENT — PAIN DESCRIPTION - LOCATION: LOCATION: BACK;HIP

## 2023-03-16 NOTE — PROGRESS NOTES
Patient discharged home with home health care. IV removed without complication. Tele removed and [facility] notified. Discharge instructions provided with no questions/concerns noted. Patient discharged with oxygen from home at discharge. Medications picked up from outpatient pharmacy.

## 2023-03-16 NOTE — FLOWSHEET NOTE
03/15/23 2130   Vital Signs   Temp 97.6 °F (36.4 °C)   Temp Source Oral   Heart Rate 66   Heart Rate Source Monitor   Resp 16   BP (!) 147/95   MAP (Calculated) 112   MAP (mmHg) 112   BP Location Right upper arm   BP Method Automatic   Patient Position Sitting   Level of Consciousness 0   MEWS Score 1   Oxygen Therapy   SpO2 100 %   O2 Device Nasal cannula   O2 Flow Rate (L/min) 3 L/min

## 2023-03-16 NOTE — PROGRESS NOTES
03/15/23 2237   NIV Type   Skin Assessment Clean, dry, & intact   Skin Protection for O2 Device No  (Patient refused)   Equipment Type V60   Mode Bilevel   Mask Type Full face mask   Mask Size Large   Settings/Measurements   PIP Observed 11 cm H20   IPAP 10 cmH20   CPAP/EPAP 5 cmH2O   Vt (Measured) 878 mL   Rate Ordered 10   Resp 17   FiO2  35 %   I Time/ I Time % 1 s   Minute Volume (L/min) 12.6 Liters   Mask Leak (lpm) 48 lpm   Comfort Level Good   Using Accessory Muscles No   SpO2 97   Alarm Settings   Alarms On Y   Low Pressure (cmH2O) 6 cmH2O   High Pressure (cmH2O) 30 cmH2O   Apnea (secs) 20 secs   RR Low (bpm) 6   RR High (bpm) 40 br/min   Oxygen Therapy/Pulse Ox   O2 Device PAP (positive airway pressure)   Heart Rate 67   SpO2 97 %

## 2023-03-16 NOTE — PLAN OF CARE
Problem: Discharge Planning  Goal: Discharge to home or other facility with appropriate resources  Outcome: Progressing     Problem: Chronic Conditions and Co-morbidities  Goal: Patient's chronic conditions and co-morbidity symptoms are monitored and maintained or improved  Outcome: Progressing     Problem: Pain  Goal: Verbalizes/displays adequate comfort level or baseline comfort level  Outcome: Progressing     Problem: Safety - Adult  Goal: Free from fall injury  Outcome: Progressing     Problem: Respiratory - Adult  Goal: Achieves optimal ventilation and oxygenation  Outcome: Progressing     Problem: Cardiovascular - Adult  Goal: Maintains optimal cardiac output and hemodynamic stability  Outcome: Progressing     Problem: Metabolic/Fluid and Electrolytes - Adult  Goal: Glucose maintained within prescribed range  Outcome: Progressing

## 2023-03-16 NOTE — PROGRESS NOTES
Department of Internal Medicine  Nephrology Progress Note        SUBJECTIVE:    We are following this patient for ESRD management. Feels fine, c/o diarrhea    HD on 3/15 w 2l   HD on 3/13 for 2 hours 40 minutes with 2.6 L net UF, no weight available    ROS: No fever or chills. Social: No family at bedside. Physical Exam:    VITALS:  BP (!) 104/59   Pulse 60   Temp 97.7 °F (36.5 °C) (Oral)   Resp 16   Ht 5' 9\" (1.753 m)   Wt 220 lb 14.4 oz (100.2 kg) Comment: pt refusing weight despite education  SpO2 97%   BMI 32.62 kg/m²     General appearance: Seems comfortable, no acute distress. Neck: Trachea midline, thyroid normal.   Lungs:  Non labored breathing, CTA to anterior auscultation. Heart:  S1S2 normal, rub or gallop. No peripheral edema. Abdomen: Soft, non-tender, no organomegaly. Skin: No lesions or rashes, warm to touch.      DATA:    CBC with Differential:    Lab Results   Component Value Date/Time    WBC 11.3 03/16/2023 04:45 AM    RBC 4.00 03/16/2023 04:45 AM    HGB 11.9 03/16/2023 04:45 AM    HCT 36.5 03/16/2023 04:45 AM     03/16/2023 04:45 AM    MCV 91.2 03/16/2023 04:45 AM    MCH 29.7 03/16/2023 04:45 AM    MCHC 32.5 03/16/2023 04:45 AM    RDW 17.1 03/16/2023 04:45 AM    SEGSPCT 73.4 05/17/2013 06:50 AM    BANDSPCT 11 03/16/2023 04:45 AM    METASPCT 2 03/03/2022 07:41 AM    LYMPHOPCT 8.0 03/16/2023 04:45 AM    MONOPCT 10.0 03/16/2023 04:45 AM    MYELOPCT 2 03/16/2023 04:45 AM    BASOPCT 0.0 03/16/2023 04:45 AM    MONOSABS 1.1 03/16/2023 04:45 AM    LYMPHSABS 0.9 03/16/2023 04:45 AM    EOSABS 0.1 03/16/2023 04:45 AM    BASOSABS 0.0 03/16/2023 04:45 AM    DIFFTYPE Auto 05/17/2013 06:50 AM     BMP:    Lab Results   Component Value Date/Time     03/16/2023 04:45 AM    K 4.7 03/16/2023 04:45 AM    K 5.4 03/10/2023 09:20 AM    CL 89 03/16/2023 04:45 AM    CO2 18 03/16/2023 04:45 AM    BUN 50 03/16/2023 04:45 AM    LABALBU 3.4 03/16/2023 04:45 AM    CREATININE 6.0 03/16/2023 04:45 AM CALCIUM 8.4 03/16/2023 04:45 AM    GFRAA 10 10/05/2022 11:53 AM    GFRAA >60 05/17/2013 06:50 AM    LABGLOM 10 03/16/2023 04:45 AM    GLUCOSE 163 03/16/2023 04:45 AM     Summary    The left ventricular systolic function is severely reduced with an ejection    fraction of 25-30 %. There is hypokinesis of the inferior, basal inferior, apex, apical anterior    and anterolateral walls. Left ventricular cavity size is mildly dilated with normal left ventricular    wall thickness. Grade I diastolic dysfunction with normal filling pressure. No changes noted from previous echo on 12-9-2022 in left ventricular    function. Mild mitral and tricuspid regurgitation. Right ventricular systolic function is mildly reduced . Systolic pulmonic artery pressure (SPAP) is estimated at 40 mmHg consistent    with mild pulmonary hypertension (Right atrial pressure of 3 mmHg). IMPRESSION/RECOMMENDATIONS:      1- ESRD: We will continue hemodialysis per MWF schedule. 2- Hyponatremia: Apply fluid restriction and attempt further UF. 3- Hyperkalemia: Mild, will apply low potassium diet. 4- HTN: Blood pressure within acceptable range. 5- Anemia: Hemoglobin above target for ESRD, hold DAVON and monitor. 6- Shortness of breath: Improving with further Ultrafiltration during dialysis. 7- staph epi bacteremia ( 1/2 sets)    Will follow ID recs --> recommending no antibiotics and repeat blood cultures drawn via HD cath on 3/14 so far negative.

## 2023-03-16 NOTE — CARE COORDINATION
CASE MANAGEMENT DISCHARGE SUMMARY      Discharge to: Home with spouse and home care    Precertification completed: [address] Exemption Notification (HENS) completed: n/a    IMM given: 03/16/23    New Durable Medical Equipment ordered/agency: n/a    Transportation:    Family/car: Private       Confirmed discharge plan with:     Patient: yes     Family:  Patient will notify     Facility/Agency, name:      [facility]  also known as [facility]   [address] [facility], [facility]. [facility] [phone] [facility] CELINE/AVS faxed        RN, name: Mary Galindo LPN    Note: Discharging nurse to complete CELINE, reconcile AVS, and place final copy with patient's discharge packet. RN to ensure that written prescriptions for  Level II medications are sent with patient to the facility as per protocol.

## 2023-03-16 NOTE — DISCHARGE SUMMARY
Discharge Summary    Patient ID: Maribel Calderon      Patient's PCP: Carlin Thibodeaux MD    Admit Date: 3/10/2023     Discharge Date:   3/16/23    Admitting Physician: Juana Hicks DO     Discharge Physician: Darlin Bernal MD     Discharge Diagnoses: Active Hospital Problems    Diagnosis     CAD in native artery [I25.10]      Priority: High    Positive blood culture [R78.81]      Priority: Medium    Elevated troponin [R77.8]      Priority: Medium    Severe sepsis (HCC) [A41.9, R65.20]      Priority: Medium    Pneumonia of left lower lobe due to infectious organism [J18.9]      Priority: Medium       The patient was seen and examined on day of discharge and this discharge summary is in conjunction with any daily progress note from day of discharge. Hospital Course:     Alirio Monroe is a 57-year-old male with past medical history of COPD, systolic heart failure, ESRD on HD, W, F, CAD, s/p TAVR, A-fib on Eliquis, history of CVA who presented to [facility] ED for shortness of breath. Patient arrived via EMS with O2 sats in route between 60s and 70s. Patient was started on BiPAP and given DuoNebs before arrival.  On ED presentation, WBC 16.3, pH 7.32, BNP 70,000, lactate 2.0, troponin 0.34. At this time, patient was meeting sepsis criteria and was found to have possible left-sided pneumonia on CXR. Patient was initially started on IV cefepime and subsequently transitioned to IV ceftriaxone and p.o. azithromycin. Blood culture returned +1 of 2 for Staph epidermidis. Infectious disease was consulted for the validity of this measure. Low suspicion for true positive blood culture with repeat BC without growth. Patient's symptoms improved over the course of hospital stay, weaned off BiPAP on 3/14 and weaned to his baseline 3L NC before discharge. He completed a total of 5 days of po azithromycin and 5 days of IV cefepime/ceftriaxone.  He was discharged on additional 2 days of augmentin 250-125mg to complete total of 7 day course of treatment. Due to patient's increased BNP >70,000 with active SOB as well as vascular congestion and pleural effusion noted on CXR, patient had repeat echo on 3/13, which showed no significant change from prior study 12/2022 (EF 25-30%). Patient's home Entresto and metoprolol were continued throughout this hospitalization. Patient was evaluated by cardiology and no changes in medical management were recommended. Patient did develop non-foul smelling, watery diarrhea during this hospitalization around day 3. C. Diff was ordered, however he did not meet criteria per the night shift nurse and this order was cancelled. Patient did have a consistently downtrending leukocytosis during admission and afebrile. He was discharged on probiotic for management of diarrhea, suspected secondary as an antibiotic side effect. Patient reported some suicidal ideation during this hospitalization. Psychiatry was consulted, who stated he did not have an active plan although does have firearms in the home. Wife was contacted and instructed to lock and hide these firearms. Given that patient did not have a plan and had strong protective factors, he did not need further intervention. His Cymbalta dose was increase to 60mg. Physical Exam Performed:     BP (!) 104/59   Pulse 60   Temp 97.7 °F (36.5 °C) (Oral)   Resp 16   Ht 5' 9\" (1.753 m)   Wt 220 lb 14.4 oz (100.2 kg) Comment: pt refusing weight despite education  SpO2 97%   BMI 32.62 kg/m²       General appearance:  No apparent distress, appears stated age and cooperative. HEENT:  Normal cephalic, atraumatic without obvious deformity. Pupils equal, round, and reactive to light. Extra ocular muscles intact. Conjunctivae/corneas clear. Neck: Supple, with full range of motion. No jugular venous distention. Trachea midline. Respiratory:  Normal respiratory effort.  Clear to auscultation, bilaterally without Rales/Wheezes/Rhonchi. Cardiovascular:  Regular rate and rhythm with normal S1/S2 without murmurs, rubs or gallops. Abdomen: Soft, non-tender, non-distended with normal bowel sounds. Musculoskeletal:  No clubbing, cyanosis or edema bilaterally. Full range of motion without deformity. Skin: Skin color, texture, turgor normal.  No rashes or lesions. Neurologic:  Neurovascularly intact without any focal sensory/motor deficits. Cranial nerves: II-XII intact, grossly non-focal.  Psychiatric:  Alert and oriented, thought content appropriate, normal insight  Capillary Refill: Brisk,< 3 seconds   Peripheral Pulses: +2 palpable, equal bilaterally       Labs: For convenience and continuity at follow-up the following most recent labs are provided:      CBC:    Lab Results   Component Value Date/Time    WBC 11.3 03/16/2023 04:45 AM    HGB 11.9 03/16/2023 04:45 AM    HCT 36.5 03/16/2023 04:45 AM     03/16/2023 04:45 AM       Renal:    Lab Results   Component Value Date/Time     03/16/2023 04:45 AM    K 4.7 03/16/2023 04:45 AM    K 5.4 03/10/2023 09:20 AM    CL 89 03/16/2023 04:45 AM    CO2 18 03/16/2023 04:45 AM    BUN 50 03/16/2023 04:45 AM    CREATININE 6.0 03/16/2023 04:45 AM    CALCIUM 8.4 03/16/2023 04:45 AM    PHOS 6.9 03/16/2023 04:45 AM         Significant Diagnostic Studies    Radiology:   XR CHEST PORTABLE   Final Result   Pulmonary vascular congestion. Left pleural effusion which appears partially loculated. Stable cardiomegaly. XR CHEST (2 VW)   Final Result   Findings most compatible pulmonary edema, which appears increased when   compared to the previous exam.  With that said, pneumonia remains in the   differential.         XR CHEST PORTABLE   Final Result   Left pleural effusion. Mild vascular congestion. Consolidative airspace   disease in the left lung base which could represent infiltrate or mass. Follow up to resolution is suggested.                 Consults:     RODY CONSULT TO NEPHROLOGY  IP CONSULT TO CARDIOLOGY  IP CONSULT TO PALLIATIVE CARE  IP CONSULT TO HEART FAILURE NURSE/COORDINATOR  IP CONSULT TO DIETITIAN  IP CONSULT TO PSYCHIATRY  IP CONSULT TO INFECTIOUS DISEASES    Disposition:  Home     Condition at Discharge: Stable    Discharge Instructions/Follow-up:      Code Status:  Limited     Activity: activity as tolerated    Diet: renal diet      Discharge Medications:     Current Discharge Medication List             Details   torsemide (DEMADEX) 100 MG tablet       pravastatin (PRAVACHOL) 40 MG tablet TAKE 1 TABLET BY MOUTH DAILY  Qty: 90 tablet, Refills: 2      clopidogrel (PLAVIX) 75 MG tablet TAKE 1 TABLET BY MOUTH DAILY  Qty: 90 tablet, Refills: 2      docusate sodium (DOK) 100 MG capsule Take 1 capsule by mouth as needed for Constipation      sennosides-docusate sodium (SENOKOT-S) 8.6-50 MG tablet Take 1 tablet by mouth as needed for Constipation    Associated Diagnoses: Constipation, unspecified constipation type      apixaban (ELIQUIS) 2.5 MG TABS tablet Take 1 tablet by mouth 2 times daily  Qty: 60 tablet, Refills: 0      gabapentin (NEURONTIN) 100 MG capsule Take 2 capsules by mouth 3 times daily as needed (neuropathic pain) for up to 10 days.   Qty: 60 capsule, Refills: 0    Associated Diagnoses: Dialysis patient, noncompliant ([facility] [address]      metoprolol succinate (TOPROL XL) 100 MG extended release tablet Take 1 tablet by mouth in the morning and at bedtime  Qty: 30 tablet, Refills: 3      cyclobenzaprine (FLEXERIL) 5 MG tablet Muscle spasms    Associated Diagnoses: Chronic pain syndrome      QUEtiapine (SEROQUEL) 50 MG tablet TAKE 1 TABLET BY MOUTH EVERY EVENING  Qty: 30 tablet, Refills: 10    Associated Diagnoses: Insomnia, unspecified type; Mood disorder (HCC)      isosorbide dinitrate (ISORDIL) 20 MG tablet Take 1 tablet by mouth 3 times daily  Qty: 90 tablet, Refills: 3      pantoprazole (PROTONIX) 40 MG tablet TAKE 1 TABLET BY MOUTH EVERY MORNING BEFORE BREAKFAST  Qty: 30 tablet, Refills: 10    Associated Diagnoses: Gastroesophageal reflux disease without esophagitis      DULoxetine (CYMBALTA) 30 MG extended release capsule TAKE 1 CAPSULE BY MOUTH EVERY DAY  Qty: 30 capsule, Refills: 10    Associated Diagnoses: Depression, unspecified depression type      B Complex-C-Folic Acid (VIRT-CAPS) 1 MG CAPS TAKE 1 CAPSULE BY MOUTH EVERY DAY  Qty: 90 capsule, Refills: 1      Calcium Acetate, Phos Binder, 667 MG CAPS TAKE 1 CAPSULE BY MOUTH THREE TIMES DAILY WITH MEALS  Qty: 90 capsule, Refills: 3      nitroGLYCERIN (NITROSTAT) 0.4 MG SL tablet DISSOLVE 1 TABLET UNDER THE TONGUE AS NEEDED FOR CHEST PAIN EVERY 5 MINUTES UP TO 3 TIMES. IF NO RELIEF CALL 911. Qty: 25 tablet, Refills: 10    Associated Diagnoses: Coronary artery disease involving native heart without angina pectoris, unspecified vessel or lesion type      vitamin D (ERGOCALCIFEROL) 66010 units CAPS capsule TK 1 C PO WEEKLY  Refills: 11      Alcohol Swabs PADS USE AS DIRECTED  Qty: 300 each, Refills: 3      ipratropium-albuterol (DUONEB) 0.5-2.5 (3) MG/3ML SOLN nebulizer solution Inhale 3 mLs into the lungs every 6 hours as needed for Shortness of Breath  Qty: 360 mL, Refills: 1      calcium carbonate (TUMS) 500 MG chewable tablet Take 1 tablet by mouth 3 times daily as needed for Heartburn. Time Spent on discharge is 39 minutes in the examination, evaluation, counseling and review of medications and discharge plan. Signed:    Cinthia Johnson MD   3/16/2023      Thank you Augustin Dominguez MD for the opportunity to be involved in this patient's care. If you have any questions or concerns please feel free to contact me at [phone].

## 2023-03-16 NOTE — PLAN OF CARE
Patient's EF (Ejection Fraction) is less than 40%    Heart Failure Medications:  Diuretics[de-identified] None    (One of the following REQUIRED for EF </= 40%/SYSTOLIC FAILURE but MAY be used in EF% >40%/DIASTOLIC FAILURE)        ACE[de-identified] None        ARB[de-identified] None         ARNI[de-identified] Sacubitril/Valsartan-Entresto    (Beta Blockers)  NON- Evidenced Based Beta Blocker (for EF% >40%/DIASTOLIC FAILURE): None    Evidenced Based Beta Blocker::(REQUIRED for EF% <40%/SYSTOLIC FAILURE) Metoprolol SUCCinate- Toprol XL  . .................................................................................................................................................. Patient's weights and intake/output reviewed: Yes    Patient's Last Weight: 99.2 kg obtained by standing scale. Difference of 0 lbs less than last documented weight.       Intake/Output Summary (Last 24 hours) at 3/16/2023 0209  Last data filed at 3/15/2023 2130  Gross per 24 hour   Intake 480 ml   Output 0 ml   Net 480 ml         Daily Weight log at bedside and being used: [employer] Provided: yes    Comorbidities Reviewed Yes    Patient has a past medical history of Ambulatory dysfunction, Aortic stenosis, Arthritis, Asthma, Bilateral hilar adenopathy syndrome, CAD (coronary artery disease), Cardiomyopathy ([address] CHF (congestive heart failure) ([address] Chronic pain, COPD (chronic obstructive pulmonary disease) ([address] Depression, Diabetes mellitus ([address] Difficult intravenous access, Emphysema of lung ([address] ESRD (end stage renal disease) on dialysis ([address] Fear of needles, Gastric ulcer, GERD (gastroesophageal reflux disease), Heart valve problem, Hemodialysis patient ([facility] [address] History of spinal fracture, Hx of blood clots, Hyperlipidemia, Hypertension, MI (myocardial infarction) ([facility] [address] Neuromuscular disorder ([facility] [address] Numbness and tingling in left arm, Pneumonia, PONV (postoperative nausea and vomiting), Prolonged emergence from general anesthesia, Sleep apnea, Stroke ([facility] [address] TIA (transient ischemic attack), and Unspecified diseases of blood and blood-forming organs. >>For CHF and Comorbidity documentation on Education Time and Topics, please see Education Tab    Progressive Mobility Assessment:  What is this patient's Current Level of Mobility?: Ambulatory-Up [place]  How was this patient Mobilized today?:  Up to Toilet/Shower, Up in Room, Up in [place], Unable to Mobilize, and Patient Refuses to Mobilize, ambulated 10 ft                 With Whom? Self                 Level of Difficulty/Assistance: Independent     Pt resting in bed at this time on BiPAP. Pt denies shortness of breath. Pt with nonpitting lower extremity edema.      Patient and/or Family's stated Goal of Care this Admission: increase activity tolerance, better understand heart failure and disease management, and be more comfortable prior to discharge        :

## 2023-03-16 NOTE — DISCHARGE INSTR - COC
Continuity of Care Form    Patient Name: Jonas Hair   :  1970  MRN:  [id number]    Admit date:  3/10/2023  Discharge date:  23    Code Status Order: Limited   Advance Directives:     Admitting Physician:  Shayy Valiente DO  PCP: Cuco Peguero MD    Discharging Nurse: [address] [place] [address] Unit/Room#: 0107/0107-01  Discharging Unit Phone Number:     Emergency Contact:   Extended Emergency Contact Information  Primary Emergency Contact: AnnCrystal  Address: [address] [facility], [address] [facility] [address] Phone: [phone]  Mobile Phone: [phone]  Relation: Spouse  Secondary Emergency Contact: ALICIA HARO JR. [facility] Phone: [phone]  Mobile Phone: [phone]  Relation: Brother/Sister  Preferred language: English   needed?  No    Past Surgical History:  Past Surgical History:   Procedure Laterality Date    AORTIC VALVE REPLACEMENT N/A 10/15/2019    TRANSCATHETER AORTIC VALVE REPLACEMENT FEMORAL APPROACH performed by Morenita Cramer MD at [address] [facility] Right 2019    PERITONEAL DIALYSIS CATHETER REMOVAL performed by Rosalia Fairchild MD at [address] [place] [address]      [facility]    CORONARY ANGIOPLASTY WITH STENT PLACEMENT  05/26/15    CYST REMOVAL  2013    EXCISION CYSTS, NECK X2 AND ABDOMINAL benign    DIAGNOSTIC CARDIAC CATH LAB PROCEDURE      DIALYSIS FISTULA CREATION Left 10/30/2017    LEFT BRACHIAL CEPHALIC FISTULA    DIALYSIS FISTULA CREATION Left 3/27/2019    LIGATION  AV FISTULA performed by Marcial Gaines MD at [address] [place] [address], DIAGNOSTIC      IR TUNNELED CATHETER PLACEMENT GREATER THAN 5 YEARS  3/21/2022    IR TUNNELED CATHETER PLACEMENT GREATER THAN 5 YEARS 3/21/2022 [facility]    IR TUNNELED CATHETER PLACEMENT GREATER THAN 5 YEARS  2022    IR TUNNELED CATHETER PLACEMENT GREATER THAN 5 YEARS 2022 [facility]    IR TUNNELED CATHETER PLACEMENT GREATER THAN 5 YEARS  4/26/2022    IR TUNNELED CATHETER PLACEMENT GREATER THAN 5 YEARS 4/26/2022 [facility]    IR TUNNELED CATHETER PLACEMENT GREATER THAN 5 YEARS  6/23/2022    IR TUNNELED CATHETER PLACEMENT GREATER THAN 5 YEARS 6/23/2022 [facility]    OTHER SURGICAL HISTORY  02/01/2017    laparoscopic cholecystectomy with intraoperative cholangiogram    OTHER SURGICAL HISTORY  2018    PORT PLACEMENT  - vas cath    OTHER SURGICAL HISTORY Bilateral 06/26/2018    laprascopic peritoneal dialysis catheter placement    OTHER SURGICAL HISTORY Right 09/2018    peritoneal dialysis port placed on right side of abdomen    OTHER SURGICAL HISTORY  05/28/2019    PTA/Stenting R External Iliac artery    TN LAPS INSERTION TUNNELED INTRAPERITONEAL CATHETER N/A 9/21/2018    LAPAROSCOPIC PERITONEAL DIALYSIS CATHETER REPLACEMENT performed by Gladys Crouch MD at [address] [place] [address] Pkwy 2/24/2022    PERINEAL ABCESS DRAINAGE performed by Gladys Crouch MD at [address] [place] [address] N/A 10/2/2022    THORACENTESIS ULTRASOUND performed by Gael Martinez MD at [address]  01/06/2016    UPPER GASTROINTESTINAL ENDOSCOPY  01/29/2017    possible candida, otherwise normal appearing    VASCULAR SURGERY  aprx 2 years ago    2 stents placed, each side of groin       Immunization History:   Immunization History   Administered Date(s) Administered    Hepatitis B Adult (Engerix-B) 11/18/2017, 06/13/2018, 07/13/2018    INFLUENZA, INTRADERMAL, QUADRIVALENT, PRESERVATIVE FREE 11/16/2016    Influenza Virus Vaccine 12/27/2012, 10/07/2014, 11/20/2015, 10/16/2019    Influenza, FLUARIX, FLULAVAL, FLUZONE (age 10 mo+) AND AFLURIA, (age 1 y+), PF, 0.5mL 10/16/2019    Influenza, FLUCELVAX, (age 10 mo+), MDCK, PF, 0.5mL 10/14/2017, 09/30/2020, 10/05/2021    Influenza, Quadv, 6 mo and older, IM, PF (Flulaval, Fluarix) 09/15/2018    PPD Test 11/09/2017, 11/16/2017, 01/03/2019, 01/06/2020, 01/15/2021    Pneumococcal Conjugate 13-valent ([id number]) 10/14/2017    Pneumococcal Polysaccharide (Dyuglhgvg72) 10/07/2014, 11/19/2019    Tdap (Boostrix, Adacel) 02/13/2020    Zoster Recombinant (Shingrix) 02/13/2020       Active Problems:  Patient Active Problem List   Diagnosis Code    Sepsis without acute organ dysfunction ([facility] A41.9    CHF (congestive heart failure) ([facility]) I50.9    Asthma-COPD overlap syndrome ([facility] J44.9    CAD in native artery I25.10    PVD (peripheral vascular disease) ([facility] I73.9    Bicuspid aortic valve Q23.1    Bilateral hilar adenopathy syndrome R59.0    Claudication in peripheral vascular disease ([facility]) I73.9    Uncontrolled hypertension I10    Diabetic neuropathy ([facility]) E11.40    DM2 (diabetes mellitus, type 2) ([facility] E11.9    Passive smoke exposure Z77.22    Depression with anxiety F41.8    PNA (pneumonia) J18.9    Obesity (BMI 30-39. 9) E66.9    ZAINAB on CPAP G47.33, Z99.89    Degeneration of lumbar or lumbosacral intervertebral disc M51.37    Lumbar radiculopathy M54.16    Lumbosacral spondylosis without myelopathy I16.882    Biliary colic S13.42    Symptomatic cholelithiasis K80.20    Gastroparesis due to DM ([facility]) E11.43, K31.84    Angina, class IV ([facility]) I20.9    Dyspnea R06.00    Dyslipidemia E78.5    Ischemic cardiomyopathy I25.5    Tobacco abuse Z72.0    CVA (cerebral vascular accident) ([facility] I63.9    History of CVA (cerebrovascular accident) Z86.73    Type 2 diabetes mellitus without complication, without long-term current use of insulin ([facility]) E11.9    ZAINAB (obstructive sleep apnea) G47.33    Pleural effusion on left J90    Chronic anemia D64.9    Nonrheumatic aortic valve stenosis I35.0    Mucus plugging of bronchi T17.500A    Hemodialysis-associated hypotension I95.3    ESRD (end stage renal disease) (HCC) N18.6    Hypotension due to drugs G62.0    Acute diastolic CHF (congestive heart failure) ([facility]) I50.31    Neuromuscular disorder ([facility]) G70.9    Renovascular hypertension I15.0    Mixed hyperlipidemia E78.2    Cigarette nicotine dependence in remission F17.211    Pulmonary edema J81.1    Fluid overload E87.70    Anemia of chronic disease D63.8    SOB (shortness of breath) on exertion R06.02    Steal syndrome of dialysis vascular access [facility]) T82.898A    Chronic, continuous use of opioids F11.90    Chronic bronchitis ([facility]) J42    Nasal congestion R09.81    Hypercholesteremia E78.00    Bradycardia R00.1    History of transcatheter aortic valve replacement (TAVR) Z95.2    Syncope and collapse R55    PAF (paroxysmal atrial fibrillation) ([facility]) I48.0    Bilateral leg weakness R29.898    GBS (Guillain-[place] syndrome) ([facility]) G61.0    Sinus pause I45.5    Weakness of both lower extremities R29.898    Sinus bradycardia R00.1    Ataxia R27.0    Peripheral vascular occlusive disease ([facility]) I73.9    Cellulitis of right foot L03.115    Iliac artery occlusion, 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    Cellulitis and abscess of hand L03.119, L02.519    Uncontrolled type 2 diabetes mellitus with hyperglycemia ([facility]) E11.65    Acute encephalopathy G93.40    Acute hypoxemic respiratory failure ([facility]) J96.01    Acute CVA (cerebrovascular accident) ([address] I63.9    Speech problem R47.9    Urinary tract infection with hematuria N39.0, R31.9    Respiratory failure with hypoxia ([address] J96.91    Acute respiratory failure with hypercapnia ([facility]) J96.02    Acute pulmonary edema ([facility]) J81.0    Grade II diastolic dysfunction V18.33    Shock circulatory ([facility]) R57.9    Smoker F17.200    Normocytic normochromic anemia D64.9    NSTEMI (non-ST elevated myocardial infarction) ([facility]) I21.4    SIRS (systemic inflammatory response syndrome) ([facility]) R65.10    Atrial flutter ([facility]) I48.92    Liberty-rectal abscess K61.1    Somnolence R40.0    Pulmonary edema with diastolic CHF, NYHA class 3 ([facility]) I50.30    Respiratory failure ([address] J96.90    Former smoker H17.605 Cardiomyopathy ([facility] I42.9    Morbid obesity with BMI of 40.0-44.9, adult ([facility]) E66.01, Z68.41    Hypoglycemia E16.2    Altered mental status R41.82    Hypertensive emergency I16.1    SOB (shortness of breath) R06.02    Acute respiratory failure with hypoxia and hypercapnia ([facility]) J96.01, J96.02    HFrEF (heart failure with reduced ejection fraction) ([facility]) I50.20    Pericardial effusion I31.39    Hypervolemia E87.70    Pneumonia of left lower lobe due to infectious organism J18.9    Acute on chronic respiratory failure with hypoxia ([facility]) J96.21    Compression atelectasis J98.11    Type 2 myocardial infarction ([facility] I21. A1    Acute respiratory failure with hypoxemia ([facility]) J96.01    Hyperkalemia E87.5    Hyponatremia F85.8    Metabolic acidemia E78.82    Pulmonary infiltrate R91.8    Leukocytosis D72.829    Hypertensive urgency I16.0    Severe sepsis ([facility]) A41.9, R65.20    Elevated troponin R77.8    Positive blood culture R78.81       Isolation/Infection:   Isolation            No Isolation          Patient Infection Status       Infection Onset Added Last Indicated Last Indicated By Review Planned Expiration Resolved Resolved By    None active    Resolved    C-diff Rule Out 03/15/23 03/15/23 03/15/23 C Diff Toxin/ Antigen (Ordered)   03/15/23 Rule-Out Test Canceled    COVID-19 (Rule Out) 03/10/23 03/10/23 03/10/23 COVID-19 & Influenza Combo (Ordered)   03/10/23 Rule-Out Test Resulted    COVID-19 (Rule Out) 01/29/23 01/29/23 01/29/23 COVID-19 & Influenza Combo (Ordered)   01/29/23 Rule-Out Test Resulted    COVID-19 (Rule Out) 12/25/22 12/25/22 12/25/22 COVID-19 & Influenza Combo (Ordered)   12/25/22 Rule-Out Test Resulted    COVID-19 (Rule Out) 12/04/22 12/04/22 12/04/22 COVID-19 & Influenza Combo (Ordered)   12/04/22 Rule-Out Test Resulted    COVID-19 (Rule Out) 12/03/22 12/03/22 12/03/22 COVID-19 & Influenza Combo (Ordered)   12/03/22 Rule-Out Test Resulted    C-diff Rule Out 12/02/22 12/02/22 12/02/22 Clostridium difficile toxin/antigen (Ordered)   12/03/22 Rule-Out Test Canceled    COVID-19 (Rule Out) 11/25/22 11/25/22 11/25/22 COVID-19 & Influenza Combo (Ordered)   11/25/22 Rule-Out Test Resulted    COVID-19 (Rule Out) 10/18/22 10/18/22 10/18/22 COVID-19, Rapid (Ordered)   10/18/22 Rule-Out Test Resulted    COVID-19 (Rule Out) 09/23/22 09/23/22 09/23/22 COVID-19, Rapid (Ordered)   09/23/22 Rule-Out Test Resulted    COVID-19 (Rule Out) 08/02/22 08/02/22 08/02/22 COVID-19, Rapid (Ordered)   08/02/22 Rule-Out Test Resulted    COVID-19 (Rule Out) 07/05/22 07/05/22 07/06/22 SARS-CoV-2 NAAT (Rapid) (Ordered)   07/06/22 Rule-Out Test Resulted    COVID-19 (Rule Out) 05/29/22 05/29/22 05/29/22 COVID-19, Rapid (Ordered)   05/29/22 Rule-Out Test Resulted    COVID-19 (Rule Out) 04/18/22 04/18/22 04/18/22 COVID-19, Rapid (Ordered)   04/18/22 Rule-Out Test Resulted    COVID-19 (Rule Out) 02/25/22 02/25/22 02/25/22 COVID-19, Rapid (Ordered)   02/25/22 Rule-Out Test Resulted    COVID-19 (Rule Out) 01/12/22 01/12/22 01/12/22 COVID-19, Rapid (Ordered)   01/12/22 Rule-Out Test Resulted    COVID-19 (Rule Out) 11/29/21 11/29/21 11/29/21 COVID-19, Rapid (Ordered)   11/30/21 Rule-Out Test Resulted    COVID-19 (Rule Out) 08/29/21 08/29/21 08/29/21 COVID-19 (Ordered)   08/29/21 Rule-Out Test Resulted    COVID-19 (Rule Out) 12/18/20 12/18/20 12/18/20 COVID-19 (Ordered)   12/18/20 Rule-Out Test Resulted    COVID-19 (Rule Out) 05/04/20 05/04/20 05/04/20 COVID-19 (Ordered)   05/04/20 Rule-Out Test Resulted            Nurse Assessment:  Last Vital Signs: BP (!) 104/59   Pulse 60   Temp 97.7 °F (36.5 °C) (Oral)   Resp 16   Ht 5' 9\" (1.753 m)   Wt 220 lb 14.4 oz (100.2 kg) Comment: pt refusing weight despite education  SpO2 97%   BMI 32.62 kg/m²     Last documented pain score (0-10 scale): Pain Level: 8  Last Weight:   Wt Readings from Last 1 Encounters:   03/16/23 220 lb 14.4 oz (100.2 kg)     Mental Status:  {IP PT MENTAL STATUS:27937}    IV Access:  [address] CELINE IV ACCESS:393296540}    Nursing Mobility/ADLs:  Walking   {CHP DME HCMK:475814044}  Transfer  {CHP DME VQAR:280159455}  Bathing  {CHP DME GXFY:002067162}  Dressing  {CHP DME UGEI:915471295}  Toileting  {CHP DME HPBE:344565902}  Feeding  {CHP DME XKZO:358089960}  Med Admin  {CHP DME OQN}  Med Delivery   { CELINE MED Delivery:092546056}    Wound Care Documentation and Therapy:  Wound 21 Toe (Comment  which one) Right Great Toe (Active)   Number of days: 562       Wound 22 Pedal Anterior;Right Healing scab from previous blister (per pt), flaky edges (Active)   Number of days: 428       Wound 22 Toe (Comment  which one) Anterior;Right Scab from old blister (per pt) on 4th toe, flaky edges (Active)   Number of days: 428       Wound 23 Toe (Comment  which one) Distal see under wound type (Active)   Number of days: 40       Wound 23 Toe (Comment  which one) small open area (Active)   Number of days: 40       Incision 22 Perineum (Active)   Number of days: 384        Elimination:  Continence: Bowel: {YES / E}  Bladder: {YES / EZ:77885}  Urinary Catheter: {Urinary Catheter:884575882}   Colostomy/Ileostomy/Ileal Conduit: {YES / YA:07268}       Date of Last BM: ***    Intake/Output Summary (Last 24 hours) at 3/16/2023 1050  Last data filed at 3/16/2023 0518  Gross per 24 hour   Intake 720 ml   Output 0 ml   Net 720 ml     I/O last 3 completed shifts:   In: 12 [P.O.:960]  Out: 0     Safety Concerns:     [address] Safety Concerns:633413990}    Impairments/Disabilities:      [address] Impairments/Disabilities:836476009}    Nutrition Therapy:  Current Nutrition Therapy:   [address] Diet List:100155454}    Routes of Feeding: {[facility] DME Other Feedings:822636745}  Liquids: {Slp liquid thickness:03638}  Daily Fluid Restriction: {CHP DME Yes amt example:108709043}  Last Modified Barium Swallow with Video (Video Swallowing Test): {Done Not Done TPZA:891113966}    Treatments at the Time of Hospital Discharge:   Respiratory Treatments: ***  Oxygen Therapy:  {Therapy; copd oxygen:71612}  Ventilator:    {MH CC Vent ASWM:787690009}    Rehab Therapies: {THERAPEUTIC INTERVENTION:8593068599}  Weight Bearing Status/Restrictions: [address] CC Weight Bearin}  Other Medical Equipment (for information only, NOT a DME order):  {EQUIPMENT:589751475}  Other Treatments: ***    Patient's personal belongings (please select all that are sent with patient):  {CHP DME Belongings:776461538}    RN SIGNATURE:  {Esignature:406560204}    CASE MANAGEMENT/SOCIAL WORK SECTION    Inpatient Status Date: 03/10/23    Readmission Risk Assessment Score:  Readmission Risk              Risk of Unplanned Readmission:  82       Discharging to Facility/ [address]  also known as Supportive home care  [address], [facility]. [facility]     / signature: Electronically signed by Gloria Ramos RN on 3/16/23 at 11:26 AM EDT    PHYSICIAN SECTION    Prognosis: Fair    Condition at Discharge: Stable    Rehab Potential (if transferring to Rehab): Fair    Recommended Labs or Other Treatments After Discharge: Finish 2 days of augmentin. Increase cymbalta to 60mg. Take lactobacillius for treatment of diarrhea for next week. Physician Certification: I certify the above information and transfer of Brandon Ortiz  is necessary for the continuing treatment of the diagnosis listed and that he requires Home Care for greater 30 days.      Update Admission H&P: No change in H&P    PHYSICIAN SIGNATURE:  Electronically signed by Deandre Ho MD on 3/16/23 at 10:50 AM EDT

## 2023-03-17 ENCOUNTER — FOLLOWUP TELEPHONE ENCOUNTER (OUTPATIENT)
Dept: TELEMETRY | Age: 53
End: 2023-03-17

## 2023-03-17 ENCOUNTER — CARE COORDINATION (OUTPATIENT)
Dept: CASE MANAGEMENT | Age: 53
End: 2023-03-17

## 2023-03-17 DIAGNOSIS — I10 UNCONTROLLED HYPERTENSION: Primary | ICD-10-CM

## 2023-03-17 PROCEDURE — 1111F DSCHRG MED/CURRENT MED MERGE: CPT | Performed by: FAMILY MEDICINE

## 2023-03-17 NOTE — TELEPHONE ENCOUNTER
1st Attempt; No Answer- VM box full and unable to leave message will attempt again at a later time.  Dominique Hawkins RN

## 2023-03-17 NOTE — TELEPHONE ENCOUNTER
Pt has been dismissed from [address] MYESHA Evans'Clementina's office.

## 2023-03-17 NOTE — CARE COORDINATION
[facility] Care Transitions Initial Follow Up Call    Call within 2 business days of discharge: Yes    Patient Current Location: [address] Transition Nurse contacted the patient by telephone to perform post hospital discharge assessment. Verified name and  with patient as identifiers. Provided introduction to self, and explanation of the Care Transition Nurse role. Patient: Domenico Evans Patient : 1970   MRN: [id number]  Reason for Admission: PNA LLL  history of ESRD on HD MWF at [facility], s/p TAVR, CAD, HTN, DM, COPD, CHF    Discharge Date: 3/16/23 RARS: Readmission Risk Score: 41.4      Last Discharge [address]       Date Complaint Diagnosis Description Type Department Provider    3/10/23 Shortness of Breath Pneumonia due to infectious organism, unspecified laterality, unspecified part of lung . .. ED to Hosp-Admission (Discharged) (ADMITTED) Oliver Munson MD; Soto Hess... Was this an external facility discharge? No Discharge Facility:     Challenges to be reviewed by the provider   Additional needs identified to be addressed with provider: Yes  Post hospitalization visit still needs scheduled. Patient was at dialysis at time of call and CTN was unable to assist with scheduling. Please assist with scheduling if visit is needed. Method of communication with provider: chart routing. Patient answered call and verified . Patient pleasant and agreeable to transition call. Stated he was at dialysis but able to talk. Stated he is doing \"alright\" since hospital discharge. Stated that he continues to be short of breath. Confirmed that he picked up new prescriptions and taking as directed. Call placed to [facility] GISSELLE Max at [address] and confirmed TEJA orders received and nurse visit scheduled today. Declined assistance from CTN with post hospitalization scheduling. Note routed to provider. Denied any acute needs at present time. Agreeable to f/u calls. Educated on the use of urgent care or physicians 24 hr access line if assistance is needed after hours. Care Transition Nurse reviewed discharge instructions and medical action plan with patient who verbalized understanding. The patient was given an opportunity to ask questions and does not have any further questions or concerns at this time. Were discharge instructions available to patient? Yes. Reviewed appropriate site of care based on symptoms and resources available to patient including: PCP  Specialist  Home health  When to call 911. The patient agrees to contact the PCP office for questions related to their healthcare. Advance Care Planning:   Does patient have an Advance Directive: reviewed and current. Palliative care consulted for goals of care. Changed code status to limited x 4 and [facility] DNR signed and placed in chart. The patient has appointed the following active healthcare agents:    Primary Decision Maker: Crystal Ann - Spouse - [phone]    Secondary Decision Maker: dian polk - Step Child - [phone]    Medication reconciliation was performed with patient, who verbalizes understanding of administration of home medications.  Medications reviewed, 1111F entered: yes    Was patient discharged with a pulse oximeter? no    Non-face-to-face services provided:  Obtained and reviewed discharge summary and/or continuity of care documents    Offered patient enrollment in the Remote Patient Monitoring (RPM) program for in-home monitoring: Patient declined in the past    Care Transitions 24 Hour Call    Do you have a copy of your discharge instructions?: Yes  Do you have all of your prescriptions and are they filled?: Yes  Have you been contacted by a [address]?: No  Have you scheduled your follow up appointment?: Yes  How are you going to get to your appointment?: Car - drive self  Post Acute Services: [facility] [address] you have support at home?: Partner/Spouse/SO  Do you feel like you have everything you need to keep you well at home?: Yes  Are you an active caregiver in your home?: No  Care Transitions Interventions         Discussed follow-up appointments. If no appointment was previously scheduled, appointment scheduling offered: Yes. Is follow up appointment scheduled within 7 days of discharge? No.    Follow Up  Future Appointments   Date Time Provider Nelly Darby   3/28/2023 11:15 AM JAY Perkins CNP [facility]       Care Transition Nurse provided contact information. Plan for follow-up call in 3-5 days based on severity of symptoms and risk factors.   Plan for next call: follow-up appointment-did pcp visit get scheduled    Alvarado Palafox RN

## 2023-03-18 LAB
BACTERIA BLD CULT ORG #2: NORMAL
BACTERIA BLD CULT ORG #2: NORMAL
BACTERIA BLD CULT: NORMAL
BACTERIA BLD CULT: NORMAL

## 2023-03-21 ENCOUNTER — CARE COORDINATION (OUTPATIENT)
Dept: CASE MANAGEMENT | Age: 53
End: 2023-03-21

## 2023-03-21 NOTE — CARE COORDINATION
[facility] Care Transitions Follow Up Call      Patient: Celine Garrison  Patient : 1970   MRN: [id number]  Reason for Admission: PNA LLL  history of ESRD on HD MWF at [facility], s/p TAVR, CAD, HTN, DM, COPD, CHF  Discharge Date: 3/16/23 RARS: Readmission Risk Score: 41.4    Attempted to reach patient via phone for transition call. No option to leave message. Message received that voice mail was full  .     Follow Up  Future Appointments   Date Time Provider Nelly Darby   3/28/2023 11:15 AM JAY Garcia - [place] R[facility]     Care Transitions Subsequent and Final Call    Subsequent and Final Calls  Care Transitions Interventions  Other Interventions:           Quita Cash RN

## 2023-03-23 ENCOUNTER — CARE COORDINATION (OUTPATIENT)
Dept: CASE MANAGEMENT | Age: 53
End: 2023-03-23

## 2023-03-23 NOTE — CARE COORDINATION
[facility] Care Transitions Follow Up Call      Patient: Margie Dover  Patient : 1970   MRN: [id number]  Reason for Admission:  PNA LLL  history of ESRD on HD MWF at [facility], s/p TAVR, CAD, HTN, DM, COPD, CHF  Discharge Date: 3/16/23 RARS: Readmission Risk Score: 41.4    Second and final attempt made to reach patient for transition call. Mailbox is full and no option to leave message    Notes reviewed and Dr Yudith Joy is no longer patient's PCP since 2022- provider removed from patient's chart in system.     Follow Up  Future Appointments   Date Time Provider Nelly Darby   3/28/2023 11:15 AM JAY Coulter - [facility]      Care Transitions Subsequent and Final Call    Subsequent and Final Calls  Care Transitions Interventions  Other Interventions:           Vero Cifuentes RN

## 2023-03-29 NOTE — CONSULTS
[address] [facility]   Cardiovascular Evaluation    PATIENT: Caryn Christy  DATE: 2022  MRN: [id number]  CSN: [id number]  : 1970    Primary Care Doctor/Referring provider: Brandan Jain MD, Terri Durham MD     Reason for evaluation/Chief complaint:   Altered Mental Status (wife called 911 for ams, pt sts\" im having a hard time breathing\" wears 3L of oxygen )    Subjective:    History of present illness on initial date of evaluation:   Caryn Christy is a 45 y.o. patient who presents for the evaluation of altered mental status. Patient is poor historian and cannot give detail information regarding history of present illness. Majority the information is taken from the chart. Patient was seen while undergoing dialysis. Patient states that he just was \"not feeling right. \"  From available documentation, the patient was apparently aggressive and growling with wife yesterday morning. She mentioned that he had significant confusion and not acting correctly which prompted referral to the emergency room. In the emergency room, the patient was diagnosed with shortness of breath attributed to volume overload with acute on chronic respiratory failure and hypoxia. Patient was subsequently admitted to the hospital for further evaluation and management. In reviewing the hospitalist note, the patient had complained possibly of chest pain that was nonradiating. Prompted consultation to cardiology for further recommendations. This morning the patient is without chest pain. He has definitely short of breath and having mild increased work of breathing. He is tolerating dialysis. My consultation note 2021 , \" Patient has an extensive history of nonischemic cardiomyopathy in conjunction with prior aortic valvular disease. Patient also has end-stage renal disease and is on hemodialysis. Patient states that he developed shortness of breath for the past several days. Procedure: Flexible cysto-uretheroscopy and stent removal   Pre Procedure Diagnosis:s/p kidney transplant   Post Procedure Diagnosis:same   Surgeon: Aranza Jones MD   Anesthesia: 2% uro-jet lidocaine jelly for local analgesia   Flexible cysto-urethroscopy was performed after consent was obtained. The risks and benefits were explained.   2% lidocaine urojet was used for local analgesia.   The genitalia was prepped and draped in the sterile fashion with betadine.   The flexible scope was advanced into the urethra and into the bladder. The stent was removed without difficulty.   The patient tolerated the procedure well without complication.   They will follow up with transplant.   .     Patient states that the shortness of breath was worse when he would try to lay flat. Patient states that he cannot catch his breath and is very limited capability from exercise tolerance. This is all progressed over the past several days. Of note, the patient was noncompliant and missed a dialysis earlier this week. States that since his dialysis time these symptoms had gotten worse. He did cut his dialysis and had extra fluid taken off but does not feel that he is back to his baseline. \"      Patient Active Problem List   Diagnosis    Sepsis without acute organ dysfunction ([address]    CHF (congestive heart failure) ([address]    Asthma-COPD overlap syndrome ([address]    Coronary artery disease involving native coronary artery of native heart without angina pectoris    PVD (peripheral vascular disease) ([address]    Bicuspid aortic valve    Bilateral hilar adenopathy syndrome    Claudication in peripheral vascular disease ([address]    HTN (hypertension), benign    Diabetic neuropathy (HCC)    Type 2 diabetes, uncontrolled, with neuropathy ([address]    Passive smoke exposure    Depression with anxiety    Pneumonia of right upper lobe due to infectious organism    Obesity (BMI 30-39. 9)    ZAINAB on CPAP    Degeneration of lumbar or lumbosacral intervertebral disc    Lumbar radiculopathy    Lumbosacral spondylosis without myelopathy    Biliary colic    Symptomatic cholelithiasis    Gastroparesis due to DM ([facility])    Angina, class IV ([facility])    Dyspnea    Dyslipidemia    Acute on chronic combined systolic and diastolic heart failure (HCC)    Ischemic cardiomyopathy    Tobacco abuse    CVA (cerebral vascular accident) ([address]    History of CVA (cerebrovascular accident)    Type 2 diabetes mellitus without complication, without long-term current use of insulin ([facility])    ZAINAB (obstructive sleep apnea)    Pleural effusion    Chronic anemia    Nonrheumatic aortic valve stenosis    Mucus plugging of bronchi    Hemodialysis-associated hypotension    ESRD [facility]    Hypotension due to drugs    Acute diastolic CHF (congestive heart failure) ([facility]    Neuromuscular disorder ([facility])    Renovascular hypertension    Mixed hyperlipidemia    Cigarette nicotine dependence in remission    Pulmonary edema    Fluid overload    Anemia of chronic disease    SOB (shortness of breath) on exertion    Steal syndrome of dialysis vascular access ([facility])    Chronic, continuous use of opioids    Chronic bronchitis ([facility]    Nasal congestion    Hypercholesteremia    Bradycardia    S/P TAVR (transcatheter aortic valve replacement)    Syncope and collapse    PAF (paroxysmal atrial fibrillation) ([facility])    Bilateral leg weakness    GBS (Guillain-Snowmass syndrome) ([facility])    Sinus pause    Weakness of both lower extremities    Sinus bradycardia    Ataxia    Peripheral vascular occlusive disease ([facility])    Cellulitis of right foot    Iliac artery occlusion, right ([facility])    Cellulitis and abscess of hand    Type 2 diabetes mellitus with hyperglycemia ([facility])    Acute encephalopathy    Acute hypoxemic respiratory failure ([facility])    Acute CVA (cerebrovascular accident) ([facility]    Speech problem    Urinary tract infection with hematuria    Respiratory failure with hypoxia ([facility]    Acute respiratory failure with hypercapnia ([facility])    Acute pulmonary edema ([facility]    Grade II diastolic dysfunction    Shock circulatory ([facility]    Smoker    Normocytic normochromic anemia    NSTEMI (non-ST elevated myocardial infarction) ([facility])    SIRS (systemic inflammatory response syndrome) ([facility]    Atrial flutter ([facility]    Liberty-rectal abscess    Somnolence    Pulmonary edema with diastolic CHF, NYHA class 3 ([facility])    Respiratory failure ([facility]    Former smoker    Cardiomyopathy ([facility]    Morbid obesity with BMI of 40.0-44.9, adult ([facility]    Hypoglycemia         Cardiac Testing: I have reviewed the findings below.   EKG:  ECHO:   STRESS TEST:  CATH:  BYPASS:  VASCULAR:    Past Medical History:   has a past medical history of Ambulatory dysfunction, Aortic stenosis, Arthritis, Asthma, Bilateral hilar adenopathy syndrome, CAD (coronary artery disease), Cardiomyopathy ([address] CHF (congestive heart failure) ([address] Chronic pain, COPD (chronic obstructive pulmonary disease) ([address] Depression, Diabetes mellitus ([address] Difficult intravenous access, Emphysema of lung ([address] ESRD (end stage renal disease) on dialysis ([address] Fear of needles, Gastric ulcer, GERD (gastroesophageal reflux disease), Heart valve problem, Hemodialysis patient ([address] History of spinal fracture, Hx of blood clots, Hyperlipidemia, Hypertension, MI (myocardial infarction) ([address] Neuromuscular disorder ([address] Numbness and tingling in left arm, Pneumonia, PONV (postoperative nausea and vomiting), Prolonged emergence from general anesthesia, Sleep apnea, Stroke ([address] TIA (transient ischemic attack), and Unspecified diseases of blood and blood-forming organs. Surgical History:   has a past surgical history that includes Tonsillectomy; cyst removal (08/14/2013); Colonoscopy; Coronary angioplasty with stent (05/26/15); vascular surgery (aprx 2 years ago); Colonoscopy (2/29/2015); Upper gastrointestinal endoscopy (01/06/2016); Upper gastrointestinal endoscopy (01/29/2017); other surgical history (02/01/2017); Dialysis fistula creation (Left, 10/30/2017); Diagnostic Cardiac Cath Lab Procedure; other surgical history (2018); other surgical history (Bilateral, 06/26/2018); pr lap insertion tunneled intraperitoneal catheter (N/A, 9/21/2018); other surgical history (Right, 09/2018); Dialysis fistula creation (Left, 3/27/2019); other surgical history (05/28/2019); Endoscopy, colon, diagnostic; Catheter Removal (Right, 7/2/2019); Aortic valve replacement (N/A, 10/15/2019); Rectal surgery (N/A, 2/24/2022); IR TUNNELED CVC PLACE WO SQ PORT/PUMP > 5 YEARS (3/21/2022);  IR TUNNELED CVC PLACE WO SQ PORT/PUMP > 5 YEARS (4/21/2022); and IR TUNNELED CVC PLACE WO SQ PORT/PUMP > 5 YEARS (4/26/2022). Social History:   reports that he quit smoking about 2 years ago. His smoking use included cigarettes. He has a 16.50 pack-year smoking history. He has never used smokeless tobacco. He reports previous alcohol use. He reports that he does not use drugs. Family History:  No evidence for sudden cardiac death or premature CAD    Medications:  Reviewed and are listed in nursing record. and/or listed below  Outpatient Medications:  Prior to Admission medications    Medication Sig Start Date End Date Taking? Authorizing Provider   clopidogrel (PLAVIX) 75 MG tablet Take 1 tablet by mouth daily 5/9/22   Devi Points, APRN - CNP   oxyCODONE-acetaminophen (PERCOCET) 7.5-325 MG per tablet Take 1 tablet by mouth every 6 hours as needed (pain) for up to 30 days.  5/4/22 6/3/22  Luz Marina Nguyen MD   cyclobenzaprine (FLEXERIL) 10 MG tablet TAKE 1 TABLET BY MOUTH EVERY 8 HOURS AS NEEDED 4/28/22   Luz Marina Nguyen MD   pantoprazole (PROTONIX) 40 MG tablet TAKE 1 TABLET BY MOUTH EVERY MORNING BEFORE BREAKFAST 4/28/22   Luz Marina Nguyen MD   QUEtiapine (SEROQUEL) 50 MG tablet TAKE 1 TABLET BY MOUTH IN THE EVENING 4/28/22   Luz Marina Nguyen MD   isosorbide dinitrate (ISORDIL) 10 MG tablet Take 1 tablet by mouth 3 times daily 4/27/22   Mirlande Goyal MD   insulin aspart (NOVOLOG FLEXPEN) 100 UNIT/ML injection pen Inject 5 Units into the skin 3 times daily (before meals) 4/27/22   Mirlande Goyal MD   QUEtiapine (SEROQUEL) 25 MG tablet Take 1 tablet by mouth nightly 4/27/22   Mirlande Goyal MD   docusate sodium (DOK) 100 MG capsule Take 1 capsule by mouth daily 4/27/22   Mirlande Goyal MD   LINZESS 145 MCG capsule TAKE 1 CAPSULE BY MOUTH IN THE MORNING BEFORE BREAKFAST 4/27/22   Luz Marina Nguyen MD   fluticasone (FLONASE) 50 MCG/ACT nasal spray SHAKE LIQUID AND USE 2 SPRAYS IN [facility] NOSTRIL DAILY 4/17/22   Arley Isabel MD Parish   DULoxetine (CYMBALTA) 30 MG extended release capsule TAKE 1 CAPSULE BY MOUTH EVERY DAY 3/29/22   Palmira Aviles MD   tiZANidine (ZANAFLEX) 4 MG tablet TAKE 1 TABLET BY MOUTH THREE TIMES DAILY 3/29/22   Palmira Aviles MD   mineral oil liquid Take 30 mLs by mouth daily as needed for Constipation 3/9/22   Palmira Aviles MD   sennosides-docusate sodium (SENOKOT-S) 8.6-50 MG tablet Take 1 tablet by mouth daily 3/9/22   Palmira Aviles MD   metoprolol succinate (TOPROL XL) 50 MG extended release tablet Take 1 tablet by mouth in the morning and at bedtime 3/3/22   Kenny Kohler MD   apixaban Carvel Mo) 5 MG TABS tablet Take 1 tablet by mouth 2 times daily 3/3/22   Kenny Kohler MD   pravastatin (PRAVACHOL) 40 MG tablet Take 1 tablet by mouth daily 2/10/22   JAY Calderon - CNP   insulin glargine [facility]) 100 UNIT/ML injection pen Inject 30 Units into the skin nightly 1/17/22   Jyothi Nath MD   Continuous Blood Gluc Sensor (DEXCOM G6 SENSOR) MISC Every 10 days 10/5/21   Palmira Aviles MD   Continuous Blood Gluc Transmit (DEXCOM G6 TRANSMITTER) MISC 1 each by Does not apply route every 3 months 10/5/21   Palmira Aviles MD   Continuous Blood Gluc  (Aj Hamilton ) [address] 1 each by Does not apply route Daily with lunch 10/5/21   Palmira Aviles MD   B Complex-C-Folic Acid (VIRT-CAPS) 1 MG CAPS TAKE 1 CAPSULE BY MOUTH EVERY DAY 9/20/21   Palmira Aviles MD   Calcium Acetate, Phos Binder, 667 MG CAPS TAKE 1 CAPSULE BY MOUTH THREE TIMES DAILY WITH MEALS 8/12/21   Palmira Aviles MD   nitroGLYCERIN (NITROSTAT) 0.4 MG SL tablet DISSOLVE 1 TABLET UNDER THE TONGUE AS NEEDED FOR CHEST PAIN EVERY 5 MINUTES UP TO 3 TIMES.  IF NO RELIEF CALL 911. 1/7/21   Palmira Aviles MD   vitamin D (ERGOCALCIFEROL) 32719 units CAPS capsule TK 1 C PO WEEKLY 6/2/19   Historical Provider, MD   Tiotropium Bromide-Olodaterol (STIOLTO RESPIMAT) 2.5-2.5 MCG/ACT AERS Inhale 2 puffs into the lungs daily 5/21/19   Mima James MD   Blood Glucose Monitoring Suppl ADAM USE AS DIRECTED. 4/25/18   Hazel Damon MD   Alcohol Swabs PADS USE AS DIRECTED 4/25/18   Hazel Damon MD   albuterol sulfate  (90 Base) MCG/ACT inhaler Inhale 2 puffs into the lungs every 6 hours as needed for Wheezing 11/8/17   Aysha Jacobs MD   ipratropium-albuterol (DUONEB) 0.5-2.5 (3) MG/3ML SOLN nebulizer solution Inhale 3 mLs into the lungs every 6 hours as needed for Shortness of Breath 10/15/17   Lanette Elena MD   calcium carbonate (TUMS) 500 MG chewable tablet Take 1 tablet by mouth 3 times daily as needed for Heartburn. Historical Provider, MD       In-patient schedule medications:   mupirocin   Topical Daily    QUEtiapine  25 mg Oral Nightly    apixaban  5 mg Oral BID    b complex-C-folic acid  1 mg Oral Daily    calcium acetate  1 capsule Oral TID WC    clopidogrel  75 mg Oral Daily    docusate sodium  100 mg Oral Daily    DULoxetine  30 mg Oral Daily    insulin glargine  15 Units SubCUTAneous Nightly    isosorbide dinitrate  10 mg Oral TID    tiotropium-olodaterol  2 puff Inhalation Daily    sennosides-docusate sodium  1 tablet Oral Daily    pravastatin  40 mg Oral Nightly    pantoprazole  40 mg Oral QAM AC    metoprolol succinate  50 mg Oral BID    sodium chloride flush  5-40 mL IntraVENous 2 times per day    piperacillin-tazobactam  3,375 mg IntraVENous Q12H    vancomycin (VANCOCIN) intermittent dosing (placeholder)   Other RX Placeholder         Infusion Medications:   sodium chloride      dextrose           Allergies:  Morphine     Review of Systems:   14 point review of systems unable to be completed due to patient condition/cooperation. All available positives mentioned in history of present illness.          Physical Examination:    [unfilled]  BP (!) 180/96   Pulse 79   Temp 97.7 °F (36.5 °C)   Resp 22   Wt 247 lb 5.7 oz (112.2 kg)   SpO2 100%   BMI 39.92 kg/m²    Weight: 247 lb 5.7 oz (112.2 kg)     Wt Readings from Last 3 Encounters:   05/30/22 247 lb 5.7 oz (112.2 kg)   05/25/22 245 lb (111.1 kg)   04/27/22 245 lb 13 oz (111.5 kg)       Intake/Output Summary (Last 24 hours) at 5/30/2022 5096  Last data filed at 5/30/2022 0416  Gross per 24 hour   Intake 1042.48 ml   Output 370 ml   Net 672.48 ml       General Appearance:  Alert, cooperative, mild distress, appears stated age   Head:  Normocephalic, without obvious abnormality, atraumatic   Eyes:  PERRL, conjunctiva/corneas clear       Nose: Nares normal, no drainage or sinus tenderness   Throat: Lips, mucosa, and tongue normal   Neck: Supple, symmetrical, trachea midline, no adenopathy, thyroid: not enlarged, symmetric, no tenderness/mass/nodules, no carotid bruit or JVD       Lungs:   Reduced to auscultation bilaterally, respirations mildly labored   Chest Wall:  No tenderness or deformity   Heart:  Regular rhythm and normal rate; distant sounds   Abdomen:   Soft, obese, non-tender, bowel sounds active all four quadrants,  no masses, no organomegaly           Extremities: Extremities normal, atraumatic, no cyanosis. + edema   Pulses: diminished   Skin: Skin color, texture, turgor normal, no rashes or lesions   Pysch: Normal mood and affect   Neurologic: Normal gross motor exam. Limited feeling in the feet and ankles. Labs  Recent Labs     05/29/22  0944 05/30/22  0450   WBC 13.3* 9.4   HGB 9.8* 8.5*   HCT 29.9* 25.7*   MCV 90.0 90.5    276     Recent Labs     05/29/22  0944 05/30/22  0450   CREATININE 8.0* 8.4*   BUN 63* 69*   * 128*   K 4.5 5.1   CL 87* 87*   CO2 24 17*     Recent Labs     05/29/22  1155   INR 1.21*   PROTIME 15.1*     Recent Labs     05/29/22  1823 05/29/22  2358 05/30/22  0450   TROPONINI 0.10* 0.09* 0.09*     Invalid input(s): PRO-BNP  No results for input(s): CHOL, LDL, HDL in the last 72 hours.     Invalid input(s): TG      Imaging:  I have reviewed the below testing personally and my interpretation is below. EKG:  Normal sinus rhythm with sinus arrhythmiaProlonged QTAbnormal ECGWhen compared with ECG of 25-MAY-2022 00:47,T wave inversion no longer evident in Lateral leadsConfirmed by WILFRED ARRINGTON MD ([id number]) on 5/29/2022 10:22:19 AM  CXR:      Assessment:  45 y.o. patient with:  1. Acute on chronic respiratory failure with hypoxia  2. Renovascular hypertension  3. History of TAVR  4. Ischemic and nonischemic cardiomyopathy  5. Elevated troponin levels      Plan:  I suspect the patient's presentation of acute hypoxic respiratory failure is definitely attributed to fluid overload from the patient's cardiac condition. Patient has been historically a poor fully compliant patient. Unfortunately due to the patient's renal failure, diuretic therapy is poorly functional for this patient. Hemodialysis to target dry weight of 109.5 kg per the service. He is currently several kilograms above his dry weight. Elevated troponin levels are from demand ischemia and poor clearance. Optimal blood pressure control.   ~We will continue to monitor after dialysis treatments. CHF education reinforced. ~salt restriction   ~fluid restriction   ~medication compliance   ~daily weights and notify of any significant weight gain/loss   ~establish with CHF nurse   ~outpatient follow-up with our CHF team  Patient has high risk for readmission despite our aggressive therapeutic recommendations and plans in the outpatient setting. All questions and concerns were addressed to the patient/family. Alternatives to my treatment were discussed. The note was completed using EMR. Every effort was made to ensure accuracy; however, inadvertent computerized transcription errors may be present.     Donny Francisco MD, Jed Barry [address], [facility], [address] [facility]  [phone] [facility]  5/30/2022  9:17 AM

## 2023-04-10 PROBLEM — R77.8 ELEVATED TROPONIN: Status: RESOLVED | Noted: 2023-03-11 | Resolved: 2023-04-10

## 2023-04-10 PROBLEM — R79.89 ELEVATED TROPONIN: Status: RESOLVED | Noted: 2023-03-11 | Resolved: 2023-04-10

## 2023-04-18 ENCOUNTER — APPOINTMENT (OUTPATIENT)
Dept: GENERAL RADIOLOGY | Age: 53
DRG: 291 | End: 2023-04-18
Payer: MEDICARE

## 2023-04-18 ENCOUNTER — HOSPITAL ENCOUNTER (INPATIENT)
Age: 53
LOS: 4 days | Discharge: HOME HEALTH | DRG: 291 | End: 2023-04-22
Attending: EMERGENCY MEDICINE | Admitting: INTERNAL MEDICINE
Payer: MEDICARE

## 2023-04-18 DIAGNOSIS — J96.01 ACUTE RESPIRATORY FAILURE WITH HYPOXIA (HCC): Primary | ICD-10-CM

## 2023-04-18 DIAGNOSIS — J81.0 ACUTE PULMONARY EDEMA (HCC): ICD-10-CM

## 2023-04-18 LAB
ALBUMIN SERPL-MCNC: 3.6 G/DL (ref 3.4–5)
ALBUMIN/GLOB SERPL: 0.8 {RATIO} (ref 1.1–2.2)
ALP SERPL-CCNC: 70 U/L (ref 40–129)
ALT SERPL-CCNC: 12 U/L (ref 10–40)
ANION GAP SERPL CALCULATED.3IONS-SCNC: 20 MMOL/L (ref 3–16)
ANISOCYTOSIS BLD QL SMEAR: ABNORMAL
AST SERPL-CCNC: 21 U/L (ref 15–37)
BASE EXCESS BLDV CALC-SCNC: -4.8 MMOL/L (ref -3–3)
BASE EXCESS BLDV CALC-SCNC: -5.7 MMOL/L (ref -3–3)
BASOPHILS # BLD: 0 K/UL (ref 0–0.2)
BASOPHILS NFR BLD: 0 %
BILIRUB SERPL-MCNC: 0.3 MG/DL (ref 0–1)
BUN SERPL-MCNC: 79 MG/DL (ref 7–20)
CALCIUM SERPL-MCNC: 10 MG/DL (ref 8.3–10.6)
CHLORIDE SERPL-SCNC: 86 MMOL/L (ref 99–110)
CO2 BLDV-SCNC: 21 MMOL/L
CO2 BLDV-SCNC: 23 MMOL/L
CO2 SERPL-SCNC: 20 MMOL/L (ref 21–32)
COHGB MFR BLDV: 2.2 % (ref 0–1.5)
COHGB MFR BLDV: 3.6 % (ref 0–1.5)
CREAT SERPL-MCNC: 9.6 MG/DL (ref 0.9–1.3)
DEPRECATED RDW RBC AUTO: 16.5 % (ref 12.4–15.4)
EKG ATRIAL RATE: 65 BPM
EKG DIAGNOSIS: NORMAL
EKG P AXIS: 92 DEGREES
EKG Q-T INTERVAL: 390 MS
EKG QRS DURATION: 104 MS
EKG QTC CALCULATION (BAZETT): 402 MS
EKG R AXIS: 147 DEGREES
EKG T AXIS: -23 DEGREES
EKG VENTRICULAR RATE: 64 BPM
EOSINOPHIL # BLD: 0 K/UL (ref 0–0.6)
EOSINOPHIL NFR BLD: 0 %
FLUAV RNA RESP QL NAA+PROBE: NOT DETECTED
FLUBV RNA RESP QL NAA+PROBE: NOT DETECTED
GFR SERPLBLD CREATININE-BSD FMLA CKD-EPI: 6 ML/MIN/{1.73_M2}
GLUCOSE BLD-MCNC: 208 MG/DL (ref 70–99)
GLUCOSE BLD-MCNC: 216 MG/DL (ref 70–99)
GLUCOSE BLD-MCNC: 220 MG/DL (ref 70–99)
GLUCOSE SERPL-MCNC: 233 MG/DL (ref 70–99)
HCO3 BLDV-SCNC: 20.2 MMOL/L (ref 23–29)
HCO3 BLDV-SCNC: 21.2 MMOL/L (ref 23–29)
HCT VFR BLD AUTO: 42.2 % (ref 40.5–52.5)
HGB BLD-MCNC: 13.2 G/DL (ref 13.5–17.5)
LYMPHOCYTES # BLD: 0.5 K/UL (ref 1–5.1)
LYMPHOCYTES NFR BLD: 3 %
MCH RBC QN AUTO: 28.7 PG (ref 26–34)
MCHC RBC AUTO-ENTMCNC: 31.3 G/DL (ref 31–36)
MCV RBC AUTO: 91.5 FL (ref 80–100)
METHGB MFR BLDV: 0.2 %
METHGB MFR BLDV: 0.2 %
MONOCYTES # BLD: 0.7 K/UL (ref 0–1.3)
MONOCYTES NFR BLD: 4 %
NEUTROPHILS # BLD: 17 K/UL (ref 1.7–7.7)
NEUTROPHILS NFR BLD: 91 %
NEUTS BAND NFR BLD MANUAL: 2 % (ref 0–7)
NT-PROBNP SERPL-MCNC: ABNORMAL PG/ML (ref 0–124)
O2 THERAPY: ABNORMAL
O2 THERAPY: ABNORMAL
PCO2 BLDV: 37.1 MMHG (ref 40–50)
PCO2 BLDV: 46.7 MMHG (ref 40–50)
PERFORMED ON: ABNORMAL
PH BLDV: 7.28 [PH] (ref 7.35–7.45)
PH BLDV: 7.35 [PH] (ref 7.35–7.45)
PLATELET # BLD AUTO: 290 K/UL (ref 135–450)
PLATELET BLD QL SMEAR: ADEQUATE
PMV BLD AUTO: 8 FL (ref 5–10.5)
PO2 BLDV: 59 MMHG (ref 25–40)
PO2 BLDV: 68.2 MMHG (ref 25–40)
POTASSIUM SERPL-SCNC: 7.7 MMOL/L (ref 3.5–5.1)
POTASSIUM SERPL-SCNC: 8 MMOL/L (ref 3.5–5.1)
PROT SERPL-MCNC: 8.3 G/DL (ref 6.4–8.2)
RBC # BLD AUTO: 4.61 M/UL (ref 4.2–5.9)
SAO2 % BLDV: 86 %
SAO2 % BLDV: 93 %
SARS-COV-2 RNA RESP QL NAA+PROBE: NOT DETECTED
SLIDE REVIEW: ABNORMAL
SODIUM SERPL-SCNC: 126 MMOL/L (ref 136–145)
TROPONIN T SERPL-MCNC: 0.11 NG/ML
WBC # BLD AUTO: 18.3 K/UL (ref 4–11)

## 2023-04-18 PROCEDURE — 84132 ASSAY OF SERUM POTASSIUM: CPT

## 2023-04-18 PROCEDURE — 85025 COMPLETE CBC W/AUTO DIFF WBC: CPT

## 2023-04-18 PROCEDURE — 94761 N-INVAS EAR/PLS OXIMETRY MLT: CPT

## 2023-04-18 PROCEDURE — 94640 AIRWAY INHALATION TREATMENT: CPT

## 2023-04-18 PROCEDURE — 83880 ASSAY OF NATRIURETIC PEPTIDE: CPT

## 2023-04-18 PROCEDURE — 6370000000 HC RX 637 (ALT 250 FOR IP): Performed by: INTERNAL MEDICINE

## 2023-04-18 PROCEDURE — 80053 COMPREHEN METABOLIC PANEL: CPT

## 2023-04-18 PROCEDURE — 71045 X-RAY EXAM CHEST 1 VIEW: CPT

## 2023-04-18 PROCEDURE — 2500000003 HC RX 250 WO HCPCS: Performed by: INTERNAL MEDICINE

## 2023-04-18 PROCEDURE — 2060000000 HC ICU INTERMEDIATE R&B

## 2023-04-18 PROCEDURE — 99285 EMERGENCY DEPT VISIT HI MDM: CPT

## 2023-04-18 PROCEDURE — 90935 HEMODIALYSIS ONE EVALUATION: CPT

## 2023-04-18 PROCEDURE — 84484 ASSAY OF TROPONIN QUANT: CPT

## 2023-04-18 PROCEDURE — 96375 TX/PRO/DX INJ NEW DRUG ADDON: CPT

## 2023-04-18 PROCEDURE — 2700000000 HC OXYGEN THERAPY PER DAY

## 2023-04-18 PROCEDURE — 2500000003 HC RX 250 WO HCPCS: Performed by: EMERGENCY MEDICINE

## 2023-04-18 PROCEDURE — 5A1D70Z PERFORMANCE OF URINARY FILTRATION, INTERMITTENT, LESS THAN 6 HOURS PER DAY: ICD-10-PCS | Performed by: INTERNAL MEDICINE

## 2023-04-18 PROCEDURE — 6370000000 HC RX 637 (ALT 250 FOR IP): Performed by: EMERGENCY MEDICINE

## 2023-04-18 PROCEDURE — 6360000002 HC RX W HCPCS: Performed by: EMERGENCY MEDICINE

## 2023-04-18 PROCEDURE — 82803 BLOOD GASES ANY COMBINATION: CPT

## 2023-04-18 PROCEDURE — 87636 SARSCOV2 & INF A&B AMP PRB: CPT

## 2023-04-18 PROCEDURE — 2580000003 HC RX 258: Performed by: INTERNAL MEDICINE

## 2023-04-18 PROCEDURE — 93010 ELECTROCARDIOGRAM REPORT: CPT | Performed by: INTERNAL MEDICINE

## 2023-04-18 PROCEDURE — 94660 CPAP INITIATION&MGMT: CPT

## 2023-04-18 PROCEDURE — 2580000003 HC RX 258: Performed by: EMERGENCY MEDICINE

## 2023-04-18 PROCEDURE — 96374 THER/PROPH/DIAG INJ IV PUSH: CPT

## 2023-04-18 PROCEDURE — 93005 ELECTROCARDIOGRAM TRACING: CPT | Performed by: EMERGENCY MEDICINE

## 2023-04-18 RX ORDER — ONDANSETRON 2 MG/ML
4 INJECTION INTRAMUSCULAR; INTRAVENOUS EVERY 6 HOURS PRN
Status: DISCONTINUED | OUTPATIENT
Start: 2023-04-18 | End: 2023-04-23 | Stop reason: HOSPADM

## 2023-04-18 RX ORDER — ONDANSETRON 2 MG/ML
4 INJECTION INTRAMUSCULAR; INTRAVENOUS ONCE
Status: COMPLETED | OUTPATIENT
Start: 2023-04-18 | End: 2023-04-18

## 2023-04-18 RX ORDER — CALCIUM GLUCONATE 94 MG/ML
1000 INJECTION, SOLUTION INTRAVENOUS ONCE
Status: COMPLETED | OUTPATIENT
Start: 2023-04-18 | End: 2023-04-18

## 2023-04-18 RX ORDER — CLOPIDOGREL BISULFATE 75 MG/1
75 TABLET ORAL DAILY
Status: DISCONTINUED | OUTPATIENT
Start: 2023-04-18 | End: 2023-04-23 | Stop reason: HOSPADM

## 2023-04-18 RX ORDER — PANTOPRAZOLE SODIUM 40 MG/1
40 TABLET, DELAYED RELEASE ORAL
Status: DISCONTINUED | OUTPATIENT
Start: 2023-04-18 | End: 2023-04-23 | Stop reason: HOSPADM

## 2023-04-18 RX ORDER — TORSEMIDE 100 MG/1
100 TABLET ORAL DAILY
Status: DISCONTINUED | OUTPATIENT
Start: 2023-04-18 | End: 2023-04-23 | Stop reason: HOSPADM

## 2023-04-18 RX ORDER — INSULIN LISPRO 100 [IU]/ML
0-8 INJECTION, SOLUTION INTRAVENOUS; SUBCUTANEOUS
Status: DISCONTINUED | OUTPATIENT
Start: 2023-04-19 | End: 2023-04-23 | Stop reason: HOSPADM

## 2023-04-18 RX ORDER — DOCUSATE SODIUM 100 MG/1
100 CAPSULE, LIQUID FILLED ORAL PRN
Status: DISCONTINUED | OUTPATIENT
Start: 2023-04-18 | End: 2023-04-23 | Stop reason: HOSPADM

## 2023-04-18 RX ORDER — SENNA AND DOCUSATE SODIUM 50; 8.6 MG/1; MG/1
1 TABLET, FILM COATED ORAL PRN
Status: DISCONTINUED | OUTPATIENT
Start: 2023-04-18 | End: 2023-04-23 | Stop reason: HOSPADM

## 2023-04-18 RX ORDER — IPRATROPIUM BROMIDE AND ALBUTEROL SULFATE 2.5; .5 MG/3ML; MG/3ML
1 SOLUTION RESPIRATORY (INHALATION) EVERY 6 HOURS PRN
Status: DISCONTINUED | OUTPATIENT
Start: 2023-04-18 | End: 2023-04-23 | Stop reason: HOSPADM

## 2023-04-18 RX ORDER — METOPROLOL SUCCINATE 50 MG/1
100 TABLET, EXTENDED RELEASE ORAL 2 TIMES DAILY
Status: DISCONTINUED | OUTPATIENT
Start: 2023-04-18 | End: 2023-04-23 | Stop reason: HOSPADM

## 2023-04-18 RX ORDER — ACETAMINOPHEN 650 MG/1
650 SUPPOSITORY RECTAL EVERY 6 HOURS PRN
Status: DISCONTINUED | OUTPATIENT
Start: 2023-04-18 | End: 2023-04-23 | Stop reason: HOSPADM

## 2023-04-18 RX ORDER — CALCIUM CARBONATE 200(500)MG
500 TABLET,CHEWABLE ORAL 3 TIMES DAILY PRN
Status: DISCONTINUED | OUTPATIENT
Start: 2023-04-18 | End: 2023-04-23 | Stop reason: HOSPADM

## 2023-04-18 RX ORDER — SODIUM CHLORIDE 0.9 % (FLUSH) 0.9 %
5-40 SYRINGE (ML) INJECTION EVERY 12 HOURS SCHEDULED
Status: DISCONTINUED | OUTPATIENT
Start: 2023-04-18 | End: 2023-04-23 | Stop reason: HOSPADM

## 2023-04-18 RX ORDER — QUETIAPINE FUMARATE 25 MG/1
50 TABLET, FILM COATED ORAL NIGHTLY
Status: DISCONTINUED | OUTPATIENT
Start: 2023-04-18 | End: 2023-04-23 | Stop reason: HOSPADM

## 2023-04-18 RX ORDER — SODIUM CHLORIDE 9 MG/ML
INJECTION, SOLUTION INTRAVENOUS PRN
Status: DISCONTINUED | OUTPATIENT
Start: 2023-04-18 | End: 2023-04-23 | Stop reason: HOSPADM

## 2023-04-18 RX ORDER — SODIUM CHLORIDE 0.9 % (FLUSH) 0.9 %
5-40 SYRINGE (ML) INJECTION PRN
Status: DISCONTINUED | OUTPATIENT
Start: 2023-04-18 | End: 2023-04-23 | Stop reason: HOSPADM

## 2023-04-18 RX ORDER — ACETAMINOPHEN 325 MG/1
650 TABLET ORAL EVERY 6 HOURS PRN
Status: DISCONTINUED | OUTPATIENT
Start: 2023-04-18 | End: 2023-04-23 | Stop reason: HOSPADM

## 2023-04-18 RX ORDER — GABAPENTIN 100 MG/1
200 CAPSULE ORAL 3 TIMES DAILY PRN
Status: DISCONTINUED | OUTPATIENT
Start: 2023-04-18 | End: 2023-04-23 | Stop reason: HOSPADM

## 2023-04-18 RX ORDER — ONDANSETRON 4 MG/1
4 TABLET, ORALLY DISINTEGRATING ORAL EVERY 8 HOURS PRN
Status: DISCONTINUED | OUTPATIENT
Start: 2023-04-18 | End: 2023-04-23 | Stop reason: HOSPADM

## 2023-04-18 RX ORDER — DEXTROSE MONOHYDRATE 100 MG/ML
INJECTION, SOLUTION INTRAVENOUS CONTINUOUS PRN
Status: DISCONTINUED | OUTPATIENT
Start: 2023-04-18 | End: 2023-04-18 | Stop reason: SDUPTHER

## 2023-04-18 RX ORDER — PRAVASTATIN SODIUM 40 MG
40 TABLET ORAL DAILY
Status: DISCONTINUED | OUTPATIENT
Start: 2023-04-18 | End: 2023-04-23 | Stop reason: HOSPADM

## 2023-04-18 RX ORDER — POLYETHYLENE GLYCOL 3350 17 G/17G
17 POWDER, FOR SOLUTION ORAL DAILY PRN
Status: DISCONTINUED | OUTPATIENT
Start: 2023-04-18 | End: 2023-04-23 | Stop reason: HOSPADM

## 2023-04-18 RX ORDER — INSULIN LISPRO 100 [IU]/ML
0-4 INJECTION, SOLUTION INTRAVENOUS; SUBCUTANEOUS NIGHTLY
Status: DISCONTINUED | OUTPATIENT
Start: 2023-04-18 | End: 2023-04-23 | Stop reason: HOSPADM

## 2023-04-18 RX ORDER — CALCIUM ACETATE 667 MG/1
667 CAPSULE ORAL
Status: DISCONTINUED | OUTPATIENT
Start: 2023-04-18 | End: 2023-04-23 | Stop reason: HOSPADM

## 2023-04-18 RX ORDER — DEXTROSE MONOHYDRATE 100 MG/ML
INJECTION, SOLUTION INTRAVENOUS CONTINUOUS PRN
Status: DISCONTINUED | OUTPATIENT
Start: 2023-04-18 | End: 2023-04-23 | Stop reason: HOSPADM

## 2023-04-18 RX ORDER — LACTOBACILLUS RHAMNOSUS GG 10B CELL
1 CAPSULE ORAL
Status: DISCONTINUED | OUTPATIENT
Start: 2023-04-18 | End: 2023-04-23 | Stop reason: HOSPADM

## 2023-04-18 RX ORDER — DULOXETIN HYDROCHLORIDE 60 MG/1
60 CAPSULE, DELAYED RELEASE ORAL DAILY
Status: DISCONTINUED | OUTPATIENT
Start: 2023-04-18 | End: 2023-04-23 | Stop reason: HOSPADM

## 2023-04-18 RX ORDER — ISOSORBIDE DINITRATE 20 MG/1
20 TABLET ORAL 3 TIMES DAILY
Status: DISCONTINUED | OUTPATIENT
Start: 2023-04-18 | End: 2023-04-23 | Stop reason: HOSPADM

## 2023-04-18 RX ADMIN — PRAVASTATIN SODIUM 40 MG: 40 TABLET ORAL at 13:12

## 2023-04-18 RX ADMIN — ISOSORBIDE DINITRATE 20 MG: 20 TABLET ORAL at 19:58

## 2023-04-18 RX ADMIN — DEXTROSE MONOHYDRATE 250 ML: 100 INJECTION, SOLUTION INTRAVENOUS at 04:13

## 2023-04-18 RX ADMIN — ALBUTEROL SULFATE 2.5 MG: 2.5 SOLUTION RESPIRATORY (INHALATION) at 02:59

## 2023-04-18 RX ADMIN — SACUBITRIL AND VALSARTAN 1 TABLET: 24; 26 TABLET, FILM COATED ORAL at 19:58

## 2023-04-18 RX ADMIN — ISOSORBIDE DINITRATE 20 MG: 20 TABLET ORAL at 13:12

## 2023-04-18 RX ADMIN — CALCIUM ACETATE 667 MG: 667 CAPSULE ORAL at 18:15

## 2023-04-18 RX ADMIN — CLOPIDOGREL BISULFATE 75 MG: 75 TABLET ORAL at 13:11

## 2023-04-18 RX ADMIN — METOPROLOL SUCCINATE 100 MG: 50 TABLET, EXTENDED RELEASE ORAL at 13:12

## 2023-04-18 RX ADMIN — APIXABAN 2.5 MG: 2.5 TABLET, FILM COATED ORAL at 13:12

## 2023-04-18 RX ADMIN — DULOXETINE HYDROCHLORIDE 60 MG: 60 CAPSULE, DELAYED RELEASE ORAL at 13:12

## 2023-04-18 RX ADMIN — INSULIN HUMAN 10 UNITS: 100 INJECTION, SOLUTION PARENTERAL at 03:55

## 2023-04-18 RX ADMIN — Medication 1 CAPSULE: at 13:12

## 2023-04-18 RX ADMIN — METOPROLOL SUCCINATE 100 MG: 50 TABLET, EXTENDED RELEASE ORAL at 19:58

## 2023-04-18 RX ADMIN — CALCIUM GLUCONATE 1000 MG: 98 INJECTION, SOLUTION INTRAVENOUS at 03:55

## 2023-04-18 RX ADMIN — PANTOPRAZOLE SODIUM 40 MG: 40 TABLET, DELAYED RELEASE ORAL at 13:12

## 2023-04-18 RX ADMIN — SACUBITRIL AND VALSARTAN 1 TABLET: 24; 26 TABLET, FILM COATED ORAL at 13:11

## 2023-04-18 RX ADMIN — ONDANSETRON 4 MG: 2 INJECTION INTRAMUSCULAR; INTRAVENOUS at 04:09

## 2023-04-18 RX ADMIN — APIXABAN 2.5 MG: 2.5 TABLET, FILM COATED ORAL at 19:58

## 2023-04-18 RX ADMIN — TORSEMIDE 100 MG: 100 TABLET ORAL at 13:12

## 2023-04-18 RX ADMIN — SODIUM BICARBONATE 50 MEQ: 84 INJECTION, SOLUTION INTRAVENOUS at 03:55

## 2023-04-18 RX ADMIN — QUETIAPINE FUMARATE 50 MG: 25 TABLET ORAL at 19:58

## 2023-04-18 RX ADMIN — SODIUM ZIRCONIUM CYCLOSILICATE 10 G: 10 POWDER, FOR SUSPENSION ORAL at 03:55

## 2023-04-18 RX ADMIN — Medication 10 ML: at 19:59

## 2023-04-18 RX ADMIN — CALCIUM ACETATE 667 MG: 667 CAPSULE ORAL at 13:11

## 2023-04-19 LAB
ALBUMIN SERPL-MCNC: 3.7 G/DL (ref 3.4–5)
ANION GAP SERPL CALCULATED.3IONS-SCNC: 15 MMOL/L (ref 3–16)
BASOPHILS # BLD: 0.1 K/UL (ref 0–0.2)
BASOPHILS NFR BLD: 1.5 %
BUN SERPL-MCNC: 58 MG/DL (ref 7–20)
CALCIUM SERPL-MCNC: 8.6 MG/DL (ref 8.3–10.6)
CHLORIDE SERPL-SCNC: 89 MMOL/L (ref 99–110)
CO2 SERPL-SCNC: 25 MMOL/L (ref 21–32)
CREAT SERPL-MCNC: 7 MG/DL (ref 0.9–1.3)
DEPRECATED RDW RBC AUTO: 16.5 % (ref 12.4–15.4)
EOSINOPHIL # BLD: 0.4 K/UL (ref 0–0.6)
EOSINOPHIL NFR BLD: 4.4 %
GFR SERPLBLD CREATININE-BSD FMLA CKD-EPI: 9 ML/MIN/{1.73_M2}
GLUCOSE BLD-MCNC: 142 MG/DL (ref 70–99)
GLUCOSE BLD-MCNC: 208 MG/DL (ref 70–99)
GLUCOSE BLD-MCNC: 273 MG/DL (ref 70–99)
GLUCOSE SERPL-MCNC: 163 MG/DL (ref 70–99)
HCT VFR BLD AUTO: 37.3 % (ref 40.5–52.5)
HGB BLD-MCNC: 11.9 G/DL (ref 13.5–17.5)
LACTATE BLDV-SCNC: 1.5 MMOL/L (ref 0.4–2)
LYMPHOCYTES # BLD: 1 K/UL (ref 1–5.1)
LYMPHOCYTES NFR BLD: 9.6 %
MCH RBC QN AUTO: 28.8 PG (ref 26–34)
MCHC RBC AUTO-ENTMCNC: 31.9 G/DL (ref 31–36)
MCV RBC AUTO: 90.3 FL (ref 80–100)
MONOCYTES # BLD: 0.6 K/UL (ref 0–1.3)
MONOCYTES NFR BLD: 6 %
NEUTROPHILS # BLD: 7.8 K/UL (ref 1.7–7.7)
NEUTROPHILS NFR BLD: 78.5 %
PERFORMED ON: ABNORMAL
PHOSPHATE SERPL-MCNC: 6.2 MG/DL (ref 2.5–4.9)
PLATELET # BLD AUTO: 268 K/UL (ref 135–450)
PMV BLD AUTO: 7.9 FL (ref 5–10.5)
POTASSIUM SERPL-SCNC: 6.1 MMOL/L (ref 3.5–5.1)
RBC # BLD AUTO: 4.13 M/UL (ref 4.2–5.9)
SODIUM SERPL-SCNC: 129 MMOL/L (ref 136–145)
WBC # BLD AUTO: 10 K/UL (ref 4–11)

## 2023-04-19 PROCEDURE — 90935 HEMODIALYSIS ONE EVALUATION: CPT

## 2023-04-19 PROCEDURE — 36415 COLL VENOUS BLD VENIPUNCTURE: CPT

## 2023-04-19 PROCEDURE — 6370000000 HC RX 637 (ALT 250 FOR IP): Performed by: INTERNAL MEDICINE

## 2023-04-19 PROCEDURE — 80069 RENAL FUNCTION PANEL: CPT

## 2023-04-19 PROCEDURE — 2060000000 HC ICU INTERMEDIATE R&B

## 2023-04-19 PROCEDURE — 83605 ASSAY OF LACTIC ACID: CPT

## 2023-04-19 PROCEDURE — 87040 BLOOD CULTURE FOR BACTERIA: CPT

## 2023-04-19 PROCEDURE — 2580000003 HC RX 258: Performed by: INTERNAL MEDICINE

## 2023-04-19 PROCEDURE — 83036 HEMOGLOBIN GLYCOSYLATED A1C: CPT

## 2023-04-19 PROCEDURE — 85025 COMPLETE CBC W/AUTO DIFF WBC: CPT

## 2023-04-19 PROCEDURE — 2500000003 HC RX 250 WO HCPCS: Performed by: INTERNAL MEDICINE

## 2023-04-19 PROCEDURE — 94660 CPAP INITIATION&MGMT: CPT

## 2023-04-19 RX ORDER — MIDODRINE HYDROCHLORIDE 5 MG/1
10 TABLET ORAL PRN
Status: DISCONTINUED | OUTPATIENT
Start: 2023-04-19 | End: 2023-04-23 | Stop reason: HOSPADM

## 2023-04-19 RX ADMIN — ISOSORBIDE DINITRATE 20 MG: 20 TABLET ORAL at 19:36

## 2023-04-19 RX ADMIN — CALCIUM ACETATE 667 MG: 667 CAPSULE ORAL at 07:55

## 2023-04-19 RX ADMIN — APIXABAN 2.5 MG: 2.5 TABLET, FILM COATED ORAL at 07:56

## 2023-04-19 RX ADMIN — PANTOPRAZOLE SODIUM 40 MG: 40 TABLET, DELAYED RELEASE ORAL at 08:01

## 2023-04-19 RX ADMIN — Medication 10 ML: at 19:44

## 2023-04-19 RX ADMIN — SACUBITRIL AND VALSARTAN 1 TABLET: 24; 26 TABLET, FILM COATED ORAL at 19:37

## 2023-04-19 RX ADMIN — TORSEMIDE 100 MG: 100 TABLET ORAL at 07:55

## 2023-04-19 RX ADMIN — Medication 10 ML: at 07:56

## 2023-04-19 RX ADMIN — SACUBITRIL AND VALSARTAN 1 TABLET: 24; 26 TABLET, FILM COATED ORAL at 07:55

## 2023-04-19 RX ADMIN — Medication 1 CAPSULE: at 07:55

## 2023-04-19 RX ADMIN — METOPROLOL SUCCINATE 100 MG: 50 TABLET, EXTENDED RELEASE ORAL at 07:55

## 2023-04-19 RX ADMIN — APIXABAN 2.5 MG: 2.5 TABLET, FILM COATED ORAL at 19:37

## 2023-04-19 RX ADMIN — QUETIAPINE FUMARATE 50 MG: 25 TABLET ORAL at 19:37

## 2023-04-19 RX ADMIN — CLOPIDOGREL BISULFATE 75 MG: 75 TABLET ORAL at 07:55

## 2023-04-19 RX ADMIN — INSULIN LISPRO 2 UNITS: 100 INJECTION, SOLUTION INTRAVENOUS; SUBCUTANEOUS at 08:04

## 2023-04-19 RX ADMIN — DULOXETINE HYDROCHLORIDE 60 MG: 60 CAPSULE, DELAYED RELEASE ORAL at 07:56

## 2023-04-19 RX ADMIN — PRAVASTATIN SODIUM 40 MG: 40 TABLET ORAL at 07:56

## 2023-04-19 RX ADMIN — METOPROLOL SUCCINATE 100 MG: 50 TABLET, EXTENDED RELEASE ORAL at 19:37

## 2023-04-19 RX ADMIN — ISOSORBIDE DINITRATE 20 MG: 20 TABLET ORAL at 07:56

## 2023-04-20 LAB
ALBUMIN SERPL-MCNC: 3.6 G/DL (ref 3.4–5)
ANION GAP SERPL CALCULATED.3IONS-SCNC: 17 MMOL/L (ref 3–16)
BUN SERPL-MCNC: 49 MG/DL (ref 7–20)
CALCIUM SERPL-MCNC: 8.3 MG/DL (ref 8.3–10.6)
CHLORIDE SERPL-SCNC: 88 MMOL/L (ref 99–110)
CO2 SERPL-SCNC: 23 MMOL/L (ref 21–32)
CREAT SERPL-MCNC: 5.5 MG/DL (ref 0.9–1.3)
DEPRECATED RDW RBC AUTO: 17.1 % (ref 12.4–15.4)
EST. AVERAGE GLUCOSE BLD GHB EST-MCNC: 200.1 MG/DL
GFR SERPLBLD CREATININE-BSD FMLA CKD-EPI: 11 ML/MIN/{1.73_M2}
GLUCOSE BLD-MCNC: 203 MG/DL (ref 70–99)
GLUCOSE BLD-MCNC: 219 MG/DL (ref 70–99)
GLUCOSE SERPL-MCNC: 198 MG/DL (ref 70–99)
HBA1C MFR BLD: 8.6 %
HCT VFR BLD AUTO: 43.3 % (ref 40.5–52.5)
HGB BLD-MCNC: 13.5 G/DL (ref 13.5–17.5)
MCH RBC QN AUTO: 29.5 PG (ref 26–34)
MCHC RBC AUTO-ENTMCNC: 31.2 G/DL (ref 31–36)
MCV RBC AUTO: 94.5 FL (ref 80–100)
PERFORMED ON: ABNORMAL
PERFORMED ON: ABNORMAL
PHOSPHATE SERPL-MCNC: 5.8 MG/DL (ref 2.5–4.9)
PLATELET # BLD AUTO: 269 K/UL (ref 135–450)
PMV BLD AUTO: 8.1 FL (ref 5–10.5)
POTASSIUM SERPL-SCNC: 5.8 MMOL/L (ref 3.5–5.1)
RBC # BLD AUTO: 4.58 M/UL (ref 4.2–5.9)
REASON FOR REJECTION: NORMAL
REJECTED TEST: NORMAL
SODIUM SERPL-SCNC: 128 MMOL/L (ref 136–145)
WBC # BLD AUTO: 9.5 K/UL (ref 4–11)

## 2023-04-20 PROCEDURE — 2060000000 HC ICU INTERMEDIATE R&B

## 2023-04-20 PROCEDURE — 6370000000 HC RX 637 (ALT 250 FOR IP): Performed by: INTERNAL MEDICINE

## 2023-04-20 PROCEDURE — 36415 COLL VENOUS BLD VENIPUNCTURE: CPT

## 2023-04-20 PROCEDURE — 80069 RENAL FUNCTION PANEL: CPT

## 2023-04-20 PROCEDURE — 85027 COMPLETE CBC AUTOMATED: CPT

## 2023-04-20 PROCEDURE — 2500000003 HC RX 250 WO HCPCS: Performed by: INTERNAL MEDICINE

## 2023-04-20 PROCEDURE — 94660 CPAP INITIATION&MGMT: CPT

## 2023-04-20 PROCEDURE — 90935 HEMODIALYSIS ONE EVALUATION: CPT

## 2023-04-20 RX ADMIN — APIXABAN 2.5 MG: 2.5 TABLET, FILM COATED ORAL at 20:52

## 2023-04-20 RX ADMIN — CALCIUM ACETATE 667 MG: 667 CAPSULE ORAL at 12:06

## 2023-04-20 RX ADMIN — PRAVASTATIN SODIUM 40 MG: 40 TABLET ORAL at 10:31

## 2023-04-20 RX ADMIN — ISOSORBIDE DINITRATE 20 MG: 20 TABLET ORAL at 10:31

## 2023-04-20 RX ADMIN — APIXABAN 2.5 MG: 2.5 TABLET, FILM COATED ORAL at 10:30

## 2023-04-20 RX ADMIN — Medication 1 CAPSULE: at 10:31

## 2023-04-20 RX ADMIN — QUETIAPINE FUMARATE 50 MG: 25 TABLET ORAL at 20:52

## 2023-04-20 RX ADMIN — CALCIUM ACETATE 667 MG: 667 CAPSULE ORAL at 10:31

## 2023-04-20 RX ADMIN — PANTOPRAZOLE SODIUM 40 MG: 40 TABLET, DELAYED RELEASE ORAL at 07:57

## 2023-04-20 RX ADMIN — METOPROLOL SUCCINATE 100 MG: 50 TABLET, EXTENDED RELEASE ORAL at 10:31

## 2023-04-20 RX ADMIN — TORSEMIDE 100 MG: 100 TABLET ORAL at 10:31

## 2023-04-20 RX ADMIN — CALCIUM ACETATE 667 MG: 667 CAPSULE ORAL at 19:08

## 2023-04-20 RX ADMIN — CLOPIDOGREL BISULFATE 75 MG: 75 TABLET ORAL at 10:31

## 2023-04-20 RX ADMIN — DULOXETINE HYDROCHLORIDE 60 MG: 60 CAPSULE, DELAYED RELEASE ORAL at 10:30

## 2023-04-20 RX ADMIN — SACUBITRIL AND VALSARTAN 1 TABLET: 24; 26 TABLET, FILM COATED ORAL at 10:31

## 2023-04-20 ASSESSMENT — ENCOUNTER SYMPTOMS
SORE THROAT: 0
TROUBLE SWALLOWING: 0
BACK PAIN: 1
WHEEZING: 1
CHEST TIGHTNESS: 0
GASTROINTESTINAL NEGATIVE: 1
CHOKING: 0
SHORTNESS OF BREATH: 1
COUGH: 0

## 2023-04-21 LAB
ALBUMIN SERPL-MCNC: 3.9 G/DL (ref 3.4–5)
ANION GAP SERPL CALCULATED.3IONS-SCNC: 17 MMOL/L (ref 3–16)
BUN SERPL-MCNC: 35 MG/DL (ref 7–20)
CALCIUM SERPL-MCNC: 8.4 MG/DL (ref 8.3–10.6)
CHLORIDE SERPL-SCNC: 86 MMOL/L (ref 99–110)
CO2 SERPL-SCNC: 24 MMOL/L (ref 21–32)
CREAT SERPL-MCNC: 5 MG/DL (ref 0.9–1.3)
DEPRECATED RDW RBC AUTO: 16.5 % (ref 12.4–15.4)
GFR SERPLBLD CREATININE-BSD FMLA CKD-EPI: 13 ML/MIN/{1.73_M2}
GLUCOSE BLD-MCNC: 126 MG/DL (ref 70–99)
GLUCOSE BLD-MCNC: 195 MG/DL (ref 70–99)
GLUCOSE BLD-MCNC: 197 MG/DL (ref 70–99)
GLUCOSE BLD-MCNC: 224 MG/DL (ref 70–99)
GLUCOSE SERPL-MCNC: 235 MG/DL (ref 70–99)
HCT VFR BLD AUTO: 39.8 % (ref 40.5–52.5)
HGB BLD-MCNC: 12.9 G/DL (ref 13.5–17.5)
MCH RBC QN AUTO: 29.2 PG (ref 26–34)
MCHC RBC AUTO-ENTMCNC: 32.3 G/DL (ref 31–36)
MCV RBC AUTO: 90.4 FL (ref 80–100)
PERFORMED ON: ABNORMAL
PHOSPHATE SERPL-MCNC: 5.3 MG/DL (ref 2.5–4.9)
PLATELET # BLD AUTO: 248 K/UL (ref 135–450)
PMV BLD AUTO: 8.1 FL (ref 5–10.5)
POTASSIUM SERPL-SCNC: 5.7 MMOL/L (ref 3.5–5.1)
RBC # BLD AUTO: 4.41 M/UL (ref 4.2–5.9)
SODIUM SERPL-SCNC: 127 MMOL/L (ref 136–145)
WBC # BLD AUTO: 10.7 K/UL (ref 4–11)

## 2023-04-21 PROCEDURE — 2580000003 HC RX 258: Performed by: INTERNAL MEDICINE

## 2023-04-21 PROCEDURE — 85027 COMPLETE CBC AUTOMATED: CPT

## 2023-04-21 PROCEDURE — 2500000003 HC RX 250 WO HCPCS: Performed by: INTERNAL MEDICINE

## 2023-04-21 PROCEDURE — 94761 N-INVAS EAR/PLS OXIMETRY MLT: CPT

## 2023-04-21 PROCEDURE — 80069 RENAL FUNCTION PANEL: CPT

## 2023-04-21 PROCEDURE — 6370000000 HC RX 637 (ALT 250 FOR IP): Performed by: INTERNAL MEDICINE

## 2023-04-21 PROCEDURE — 36415 COLL VENOUS BLD VENIPUNCTURE: CPT

## 2023-04-21 PROCEDURE — 2700000000 HC OXYGEN THERAPY PER DAY

## 2023-04-21 PROCEDURE — 2060000000 HC ICU INTERMEDIATE R&B

## 2023-04-21 PROCEDURE — 94660 CPAP INITIATION&MGMT: CPT

## 2023-04-21 RX ORDER — LIDOCAINE 4 G/G
1 PATCH TOPICAL DAILY
Status: DISCONTINUED | OUTPATIENT
Start: 2023-04-21 | End: 2023-04-23 | Stop reason: HOSPADM

## 2023-04-21 RX ORDER — LOPERAMIDE HYDROCHLORIDE 2 MG/1
2 CAPSULE ORAL 4 TIMES DAILY PRN
Status: DISCONTINUED | OUTPATIENT
Start: 2023-04-21 | End: 2023-04-23 | Stop reason: HOSPADM

## 2023-04-21 RX ORDER — OXYCODONE HYDROCHLORIDE AND ACETAMINOPHEN 5; 325 MG/1; MG/1
1 TABLET ORAL EVERY 6 HOURS PRN
Status: DISCONTINUED | OUTPATIENT
Start: 2023-04-21 | End: 2023-04-23 | Stop reason: HOSPADM

## 2023-04-21 RX ADMIN — OXYCODONE AND ACETAMINOPHEN 1 TABLET: 5; 325 TABLET ORAL at 14:11

## 2023-04-21 RX ADMIN — SACUBITRIL AND VALSARTAN 1 TABLET: 24; 26 TABLET, FILM COATED ORAL at 08:26

## 2023-04-21 RX ADMIN — LOPERAMIDE HYDROCHLORIDE 2 MG: 2 CAPSULE ORAL at 14:08

## 2023-04-21 RX ADMIN — CALCIUM ACETATE 667 MG: 667 CAPSULE ORAL at 16:44

## 2023-04-21 RX ADMIN — Medication 10 ML: at 08:27

## 2023-04-21 RX ADMIN — ISOSORBIDE DINITRATE 20 MG: 20 TABLET ORAL at 14:08

## 2023-04-21 RX ADMIN — ACETAMINOPHEN 650 MG: 325 TABLET ORAL at 08:31

## 2023-04-21 RX ADMIN — APIXABAN 2.5 MG: 2.5 TABLET, FILM COATED ORAL at 21:35

## 2023-04-21 RX ADMIN — CLOPIDOGREL BISULFATE 75 MG: 75 TABLET ORAL at 08:26

## 2023-04-21 RX ADMIN — ISOSORBIDE DINITRATE 20 MG: 20 TABLET ORAL at 21:34

## 2023-04-21 RX ADMIN — Medication 1 CAPSULE: at 08:26

## 2023-04-21 RX ADMIN — METOPROLOL SUCCINATE 100 MG: 50 TABLET, EXTENDED RELEASE ORAL at 08:26

## 2023-04-21 RX ADMIN — ISOSORBIDE DINITRATE 20 MG: 20 TABLET ORAL at 08:26

## 2023-04-21 RX ADMIN — TORSEMIDE 100 MG: 100 TABLET ORAL at 08:26

## 2023-04-21 RX ADMIN — DULOXETINE HYDROCHLORIDE 60 MG: 60 CAPSULE, DELAYED RELEASE ORAL at 08:26

## 2023-04-21 RX ADMIN — SACUBITRIL AND VALSARTAN 1 TABLET: 24; 26 TABLET, FILM COATED ORAL at 21:30

## 2023-04-21 RX ADMIN — CALCIUM ACETATE 667 MG: 667 CAPSULE ORAL at 12:01

## 2023-04-21 RX ADMIN — Medication 10 ML: at 21:35

## 2023-04-21 RX ADMIN — QUETIAPINE FUMARATE 50 MG: 25 TABLET ORAL at 21:34

## 2023-04-21 RX ADMIN — CALCIUM ACETATE 667 MG: 667 CAPSULE ORAL at 08:27

## 2023-04-21 RX ADMIN — PRAVASTATIN SODIUM 40 MG: 40 TABLET ORAL at 08:26

## 2023-04-21 RX ADMIN — PANTOPRAZOLE SODIUM 40 MG: 40 TABLET, DELAYED RELEASE ORAL at 08:26

## 2023-04-21 RX ADMIN — APIXABAN 2.5 MG: 2.5 TABLET, FILM COATED ORAL at 08:27

## 2023-04-21 RX ADMIN — METOPROLOL SUCCINATE 100 MG: 50 TABLET, EXTENDED RELEASE ORAL at 21:34

## 2023-04-21 ASSESSMENT — PAIN DESCRIPTION - DESCRIPTORS
DESCRIPTORS: ACHING
DESCRIPTORS: ACHING

## 2023-04-21 ASSESSMENT — PAIN SCALES - GENERAL
PAINLEVEL_OUTOF10: 0
PAINLEVEL_OUTOF10: 3
PAINLEVEL_OUTOF10: 0
PAINLEVEL_OUTOF10: 6

## 2023-04-21 ASSESSMENT — PAIN DESCRIPTION - LOCATION
LOCATION: BACK
LOCATION: HEAD

## 2023-04-21 ASSESSMENT — PAIN DESCRIPTION - ORIENTATION
ORIENTATION: LOWER
ORIENTATION: MID

## 2023-04-22 VITALS
HEART RATE: 68 BPM | OXYGEN SATURATION: 99 % | RESPIRATION RATE: 18 BRPM | DIASTOLIC BLOOD PRESSURE: 84 MMHG | BODY MASS INDEX: 33.14 KG/M2 | TEMPERATURE: 98 F | SYSTOLIC BLOOD PRESSURE: 130 MMHG | WEIGHT: 224.43 LBS

## 2023-04-22 LAB
ALBUMIN SERPL-MCNC: 3.6 G/DL (ref 3.4–5)
ANION GAP SERPL CALCULATED.3IONS-SCNC: 16 MMOL/L (ref 3–16)
BUN SERPL-MCNC: 58 MG/DL (ref 7–20)
CALCIUM SERPL-MCNC: 8 MG/DL (ref 8.3–10.6)
CHLORIDE SERPL-SCNC: 80 MMOL/L (ref 99–110)
CO2 SERPL-SCNC: 22 MMOL/L (ref 21–32)
CREAT SERPL-MCNC: 6.4 MG/DL (ref 0.9–1.3)
DEPRECATED RDW RBC AUTO: 16.5 % (ref 12.4–15.4)
GFR SERPLBLD CREATININE-BSD FMLA CKD-EPI: 10 ML/MIN/{1.73_M2}
GLUCOSE BLD-MCNC: 173 MG/DL (ref 70–99)
GLUCOSE BLD-MCNC: 234 MG/DL (ref 70–99)
GLUCOSE SERPL-MCNC: 229 MG/DL (ref 70–99)
HCT VFR BLD AUTO: 35.8 % (ref 40.5–52.5)
HGB BLD-MCNC: 11.4 G/DL (ref 13.5–17.5)
MCH RBC QN AUTO: 28.8 PG (ref 26–34)
MCHC RBC AUTO-ENTMCNC: 32 G/DL (ref 31–36)
MCV RBC AUTO: 90 FL (ref 80–100)
PERFORMED ON: ABNORMAL
PERFORMED ON: ABNORMAL
PHOSPHATE SERPL-MCNC: 6.8 MG/DL (ref 2.5–4.9)
PLATELET # BLD AUTO: 245 K/UL (ref 135–450)
PMV BLD AUTO: 8.2 FL (ref 5–10.5)
POTASSIUM SERPL-SCNC: 5.4 MMOL/L (ref 3.5–5.1)
RBC # BLD AUTO: 3.98 M/UL (ref 4.2–5.9)
SODIUM SERPL-SCNC: 118 MMOL/L (ref 136–145)
SODIUM SERPL-SCNC: 127 MMOL/L (ref 136–145)
WBC # BLD AUTO: 7.7 K/UL (ref 4–11)

## 2023-04-22 PROCEDURE — P9047 ALBUMIN (HUMAN), 25%, 50ML: HCPCS

## 2023-04-22 PROCEDURE — 36415 COLL VENOUS BLD VENIPUNCTURE: CPT

## 2023-04-22 PROCEDURE — 2500000003 HC RX 250 WO HCPCS: Performed by: INTERNAL MEDICINE

## 2023-04-22 PROCEDURE — 84295 ASSAY OF SERUM SODIUM: CPT

## 2023-04-22 PROCEDURE — 94660 CPAP INITIATION&MGMT: CPT

## 2023-04-22 PROCEDURE — 85027 COMPLETE CBC AUTOMATED: CPT

## 2023-04-22 PROCEDURE — 90935 HEMODIALYSIS ONE EVALUATION: CPT

## 2023-04-22 PROCEDURE — 6370000000 HC RX 637 (ALT 250 FOR IP)

## 2023-04-22 PROCEDURE — 80069 RENAL FUNCTION PANEL: CPT

## 2023-04-22 PROCEDURE — 6370000000 HC RX 637 (ALT 250 FOR IP): Performed by: INTERNAL MEDICINE

## 2023-04-22 PROCEDURE — 94761 N-INVAS EAR/PLS OXIMETRY MLT: CPT

## 2023-04-22 PROCEDURE — 2060000000 HC ICU INTERMEDIATE R&B

## 2023-04-22 PROCEDURE — 6360000002 HC RX W HCPCS

## 2023-04-22 PROCEDURE — 2700000000 HC OXYGEN THERAPY PER DAY

## 2023-04-22 RX ORDER — ACETAMINOPHEN 325 MG/1
TABLET ORAL
Status: COMPLETED
Start: 2023-04-22 | End: 2023-04-22

## 2023-04-22 RX ORDER — ALBUMIN (HUMAN) 12.5 G/50ML
SOLUTION INTRAVENOUS
Status: COMPLETED
Start: 2023-04-22 | End: 2023-04-22

## 2023-04-22 RX ORDER — ALBUMIN (HUMAN) 12.5 G/50ML
12.5 SOLUTION INTRAVENOUS PRN
Status: DISCONTINUED | OUTPATIENT
Start: 2023-04-22 | End: 2023-04-23 | Stop reason: HOSPADM

## 2023-04-22 RX ORDER — ALBUMIN (HUMAN) 12.5 G/50ML
25 SOLUTION INTRAVENOUS ONCE
Status: COMPLETED | OUTPATIENT
Start: 2023-04-22 | End: 2023-04-22

## 2023-04-22 RX ORDER — MIDODRINE HYDROCHLORIDE 5 MG/1
TABLET ORAL
Status: COMPLETED
Start: 2023-04-22 | End: 2023-04-22

## 2023-04-22 RX ADMIN — Medication 1 CAPSULE: at 14:05

## 2023-04-22 RX ADMIN — ALBUMIN (HUMAN) 25 G: 12.5 SOLUTION INTRAVENOUS at 10:25

## 2023-04-22 RX ADMIN — INSULIN LISPRO 2 UNITS: 100 INJECTION, SOLUTION INTRAVENOUS; SUBCUTANEOUS at 18:14

## 2023-04-22 RX ADMIN — MIDODRINE HYDROCHLORIDE 10 MG: 5 TABLET ORAL at 10:09

## 2023-04-22 RX ADMIN — TORSEMIDE 100 MG: 100 TABLET ORAL at 14:04

## 2023-04-22 RX ADMIN — METOPROLOL SUCCINATE 100 MG: 50 TABLET, EXTENDED RELEASE ORAL at 14:05

## 2023-04-22 RX ADMIN — CALCIUM ACETATE 667 MG: 667 CAPSULE ORAL at 18:13

## 2023-04-22 RX ADMIN — ISOSORBIDE DINITRATE 20 MG: 20 TABLET ORAL at 14:04

## 2023-04-22 RX ADMIN — ACETAMINOPHEN 325MG 650 MG: 325 TABLET ORAL at 10:22

## 2023-04-22 RX ADMIN — CALCIUM ACETATE 667 MG: 667 CAPSULE ORAL at 14:05

## 2023-04-22 RX ADMIN — PANTOPRAZOLE SODIUM 40 MG: 40 TABLET, DELAYED RELEASE ORAL at 14:04

## 2023-04-22 RX ADMIN — ACETAMINOPHEN 650 MG: 325 TABLET ORAL at 10:22

## 2023-04-22 RX ADMIN — SACUBITRIL AND VALSARTAN 1 TABLET: 24; 26 TABLET, FILM COATED ORAL at 14:05

## 2023-04-22 RX ADMIN — DULOXETINE HYDROCHLORIDE 60 MG: 60 CAPSULE, DELAYED RELEASE ORAL at 14:05

## 2023-04-22 RX ADMIN — ALBUMIN (HUMAN) 25 G: 0.25 INJECTION, SOLUTION INTRAVENOUS at 10:25

## 2023-04-22 RX ADMIN — APIXABAN 2.5 MG: 2.5 TABLET, FILM COATED ORAL at 14:05

## 2023-04-22 RX ADMIN — PRAVASTATIN SODIUM 40 MG: 40 TABLET ORAL at 14:04

## 2023-04-22 RX ADMIN — CLOPIDOGREL BISULFATE 75 MG: 75 TABLET ORAL at 14:04

## 2023-04-23 LAB
BACTERIA BLD CULT ORG #2: NORMAL
BACTERIA BLD CULT: NORMAL

## 2023-04-24 ENCOUNTER — CARE COORDINATION (OUTPATIENT)
Dept: CASE MANAGEMENT | Age: 53
End: 2023-04-24

## 2023-04-24 NOTE — CARE COORDINATION
[facility] Care Transitions Initial Follow Up Call    Call within 2 business days of discharge: Yes    Patient Current Location:  Home: [facility] [address]    Care Transition Nurse contacted the patient by telephone to perform post hospital discharge assessment. Verified name and  with patient as identifiers. Provided introduction to self, and explanation of the Care Transition Nurse role. Patient: Gerardo Salvador Patient : 1970   MRN: [id number]  Reason for Admission: ARF w/ hypoxia  Discharge Date: 23 RARS: Readmission Risk Score: 44.8      Last Discharge [address]       Date Complaint Diagnosis Description Type Department Provider    23 Shortness of Breath Acute respiratory failure with hypoxia ([facility] . .. ED to Hosp-Admission (Discharged) (ADMITTED) Gem Sushade Kelley [address], DO; [facility]. .. Was this an external facility discharge? No Discharge Facility:     Challenges to be reviewed by the provider   Additional needs identified to be addressed with provider: No  none               Method of communication with provider: none. Chart reviewed and patient did not have PCP listed in system. Notes reviewed and Dr Mic Davis is no longer patient's PCP. Patient was d/c with medical house calls and support home care. Spoke to Iman at [facility] and confirmed that patient is active with practice. Patient's primary care provider is NP, Lee Maynard at [facility] and added to system. Notified of patient's recent d/c and visit with NP to be scheduled for visit this week. Attempted to reach patient via phone for initial post hospital transition call.   Mailbox is full and no option to leave message        Care Transitions 24 Hour Call    Do you have a copy of your discharge instructions?: Yes  Do you have all of your prescriptions and are they filled?: Yes  Have you been contacted by a [address] [place] [address] Avenue?: No  Have you scheduled your follow up

## 2023-04-25 ENCOUNTER — CARE COORDINATION (OUTPATIENT)
Dept: CASE MANAGEMENT | Age: 53
End: 2023-04-25

## 2023-04-25 ENCOUNTER — FOLLOWUP TELEPHONE ENCOUNTER (OUTPATIENT)
Dept: TELEMETRY | Age: 53
End: 2023-04-25

## 2023-04-25 NOTE — CARE COORDINATION
[facility] Care Transitions Initial Follow Up Call    Call within 2 business days of discharge: Yes     Patient: Yousif Fonseca Patient : 1970   MRN: [id number]  Reason for Admission: ARF w/ hypoxia  Discharge Date: 23 RARS: Readmission Risk Score: 44.8      Last Discharge  Street       Date Complaint Diagnosis Description Type Department Provider    23 Shortness of Breath Acute respiratory failure with hypoxia ([facility] . .. ED to Hosp-Admission (Discharged) (ADMITTED) Gem Kelley [address], DO; [address] [facility]. .. Second and final attempt made to reach patient for initial post hospital transition call. message received that mailbox is full and no option to leave message    Care Transitions 24 Hour Call    Do you have all of your prescriptions and are they filled?: Yes  Do you have support at home?: Partner/Spouse/SO  Are you an active caregiver in your home?: No  Care Transitions Interventions         Follow Up  No future appointments.     Alycia Davis RN

## 2023-04-26 DIAGNOSIS — G89.4 CHRONIC PAIN SYNDROME: ICD-10-CM

## 2023-04-26 RX ORDER — CYCLOBENZAPRINE HCL 10 MG
TABLET ORAL
Qty: 90 TABLET | Refills: 10 | OUTPATIENT
Start: 2023-04-26

## 2023-04-26 NOTE — DISCHARGE SUMMARY
Hospital Medicine Discharge Summary    Patient ID: Dafne Bravo      Patient's PCP: Klarissa Lopes MD    Admit Date: 4/18/2023     Discharge Date: 4/22/2023      Admitting Physician: Desire Romero MD     Discharge Physician: Rylie Salmon DO     Discharge Diagnoses: Active Hospital Problems    Diagnosis     Acute respiratory failure with hypoxemia (HCC) [J96.01]      Priority: Medium    Pulmonary edema [J81.1]     ESRD (end stage renal disease) ([facility] [address] [N18.6]        The patient was seen and examined on day of discharge and this discharge summary is in conjunction with any daily progress note from day of discharge. Hospital Course:   52 y.o. male who presented to [facility] with SOB after missing dialysis on 4/17. ESRD - regular HD MWF  Hyperkalemia life threatening  HD emergently    MWF schedule   Low K diet       His prognosis is very poor. He has discussed hospice with nephro at the dialysis unit but wife is not on board with that plan. He has multiple admissions and had end stage lung, kidney, and heart disease. Nephrology did midodrine and IV albumin x 3 to get more fluid off of him prior to discharge. Sodium of 118 is likely an error, as was back to 127 on recheck    Acute Respiratory Failure - w/ hypoxia, POArrival.  Presence of clinical respiratory distress w/ tachypnea/dyspnea/SOB and wheezing w/ use of accessory muscles to breath. Supplemental O2 weaned     Acute on chronic systolic heart failure. Patient with elevated BNP. Patient is a dialysis patient. Volume management per nephrology. Type 2 diabetes. SSI. Atrial fibrillation. Continue Eliquis and rate control with metoprolol. CAD with history of TAVR. Continue Plavix and pravastatin.     Physical Exam Performed:     /84   Pulse 68   Temp 98 °F (36.7 °C)   Resp 18   Wt 224 lb 6.9 oz (101.8 kg)   SpO2 99%   BMI 33.14 kg/m²     General appearance: No apparent distress, appears

## 2023-05-01 ENCOUNTER — APPOINTMENT (OUTPATIENT)
Dept: CT IMAGING | Age: 53
DRG: 246 | End: 2023-05-01
Payer: MEDICARE

## 2023-05-01 ENCOUNTER — APPOINTMENT (OUTPATIENT)
Dept: GENERAL RADIOLOGY | Age: 53
DRG: 246 | End: 2023-05-01
Payer: MEDICARE

## 2023-05-01 ENCOUNTER — HOSPITAL ENCOUNTER (INPATIENT)
Age: 53
LOS: 10 days | Discharge: HOME HEALTH | DRG: 246 | End: 2023-05-11
Attending: INTERNAL MEDICINE | Admitting: INTERNAL MEDICINE
Payer: MEDICARE

## 2023-05-01 DIAGNOSIS — Z99.2 ESRD (END STAGE RENAL DISEASE) ON DIALYSIS (HCC): Primary | ICD-10-CM

## 2023-05-01 DIAGNOSIS — J44.1 COPD EXACERBATION (HCC): ICD-10-CM

## 2023-05-01 DIAGNOSIS — R91.1 PULMONARY NODULE: ICD-10-CM

## 2023-05-01 DIAGNOSIS — N18.6 ESRD (END STAGE RENAL DISEASE) ON DIALYSIS (HCC): Primary | ICD-10-CM

## 2023-05-01 LAB
ALBUMIN SERPL-MCNC: 3.8 G/DL (ref 3.4–5)
ALBUMIN/GLOB SERPL: 1.1 {RATIO} (ref 1.1–2.2)
ALP SERPL-CCNC: 53 U/L (ref 40–129)
ALT SERPL-CCNC: 8 U/L (ref 10–40)
ANION GAP SERPL CALCULATED.3IONS-SCNC: 17 MMOL/L (ref 3–16)
AST SERPL-CCNC: 13 U/L (ref 15–37)
BASE EXCESS BLDV CALC-SCNC: -3.3 MMOL/L (ref -3–3)
BASOPHILS # BLD: 0.1 K/UL (ref 0–0.2)
BASOPHILS NFR BLD: 0.9 %
BILIRUB SERPL-MCNC: <0.2 MG/DL (ref 0–1)
BILIRUB UR QL STRIP.AUTO: NEGATIVE
BUN SERPL-MCNC: 73 MG/DL (ref 7–20)
CALCIUM SERPL-MCNC: 9 MG/DL (ref 8.3–10.6)
CHLORIDE SERPL-SCNC: 84 MMOL/L (ref 99–110)
CLARITY UR: CLEAR
CO2 BLDV-SCNC: 20 MMOL/L
CO2 SERPL-SCNC: 21 MMOL/L (ref 21–32)
COHGB MFR BLDV: 3.6 % (ref 0–1.5)
COLOR UR: YELLOW
CREAT SERPL-MCNC: 8.5 MG/DL (ref 0.9–1.3)
DEPRECATED RDW RBC AUTO: 17.1 % (ref 12.4–15.4)
EKG ATRIAL RATE: 92 BPM
EKG DIAGNOSIS: NORMAL
EKG P AXIS: 89 DEGREES
EKG P-R INTERVAL: 172 MS
EKG Q-T INTERVAL: 362 MS
EKG QRS DURATION: 100 MS
EKG QTC CALCULATION (BAZETT): 447 MS
EKG R AXIS: 112 DEGREES
EKG T AXIS: 72 DEGREES
EKG VENTRICULAR RATE: 92 BPM
EOSINOPHIL # BLD: 0.2 K/UL (ref 0–0.6)
EOSINOPHIL NFR BLD: 2 %
EPI CELLS #/AREA URNS HPF: NORMAL /HPF (ref 0–5)
GFR SERPLBLD CREATININE-BSD FMLA CKD-EPI: 7 ML/MIN/{1.73_M2}
GLUCOSE SERPL-MCNC: 279 MG/DL (ref 70–99)
GLUCOSE UR STRIP.AUTO-MCNC: 500 MG/DL
HCO3 BLDV-SCNC: 19.2 MMOL/L (ref 23–29)
HCT VFR BLD AUTO: 34.9 % (ref 40.5–52.5)
HGB BLD-MCNC: 10.9 G/DL (ref 13.5–17.5)
HGB UR QL STRIP.AUTO: ABNORMAL
KETONES UR STRIP.AUTO-MCNC: NEGATIVE MG/DL
LEUKOCYTE ESTERASE UR QL STRIP.AUTO: NEGATIVE
LYMPHOCYTES # BLD: 0.7 K/UL (ref 1–5.1)
LYMPHOCYTES NFR BLD: 6.8 %
MCH RBC QN AUTO: 29.3 PG (ref 26–34)
MCHC RBC AUTO-ENTMCNC: 31.3 G/DL (ref 31–36)
MCV RBC AUTO: 93.7 FL (ref 80–100)
METHGB MFR BLDV: 0.3 %
MONOCYTES # BLD: 0.6 K/UL (ref 0–1.3)
MONOCYTES NFR BLD: 5.8 %
NEUTROPHILS # BLD: 9.1 K/UL (ref 1.7–7.7)
NEUTROPHILS NFR BLD: 84.5 %
NITRITE UR QL STRIP.AUTO: NEGATIVE
NT-PROBNP SERPL-MCNC: ABNORMAL PG/ML (ref 0–124)
O2 THERAPY: ABNORMAL
PCO2 BLDV: 27.4 MMHG (ref 40–50)
PH BLDV: 7.46 [PH] (ref 7.35–7.45)
PH UR STRIP.AUTO: 8 [PH] (ref 5–8)
PLATELET # BLD AUTO: 272 K/UL (ref 135–450)
PMV BLD AUTO: 7.3 FL (ref 5–10.5)
PO2 BLDV: 203.6 MMHG (ref 25–40)
POTASSIUM SERPL-SCNC: 5.5 MMOL/L (ref 3.5–5.1)
PROT SERPL-MCNC: 7.3 G/DL (ref 6.4–8.2)
PROT UR STRIP.AUTO-MCNC: 100 MG/DL
RBC # BLD AUTO: 3.73 M/UL (ref 4.2–5.9)
RBC #/AREA URNS HPF: NORMAL /HPF (ref 0–4)
SAO2 % BLDV: 99 %
SODIUM SERPL-SCNC: 122 MMOL/L (ref 136–145)
SP GR UR STRIP.AUTO: 1.01 (ref 1–1.03)
TROPONIN, HIGH SENSITIVITY: 123 NG/L (ref 0–22)
UA COMPLETE W REFLEX CULTURE PNL UR: ABNORMAL
UA DIPSTICK W REFLEX MICRO PNL UR: YES
URN SPEC COLLECT METH UR: ABNORMAL
UROBILINOGEN UR STRIP-ACNC: 0.2 E.U./DL
WBC # BLD AUTO: 10.8 K/UL (ref 4–11)
WBC #/AREA URNS HPF: NORMAL /HPF (ref 0–5)

## 2023-05-01 PROCEDURE — 94660 CPAP INITIATION&MGMT: CPT

## 2023-05-01 PROCEDURE — 94761 N-INVAS EAR/PLS OXIMETRY MLT: CPT

## 2023-05-01 PROCEDURE — 5A1D70Z PERFORMANCE OF URINARY FILTRATION, INTERMITTENT, LESS THAN 6 HOURS PER DAY: ICD-10-PCS | Performed by: INTERNAL MEDICINE

## 2023-05-01 PROCEDURE — 85025 COMPLETE CBC W/AUTO DIFF WBC: CPT

## 2023-05-01 PROCEDURE — 90935 HEMODIALYSIS ONE EVALUATION: CPT

## 2023-05-01 PROCEDURE — 2700000000 HC OXYGEN THERAPY PER DAY

## 2023-05-01 PROCEDURE — 6370000000 HC RX 637 (ALT 250 FOR IP): Performed by: NURSE PRACTITIONER

## 2023-05-01 PROCEDURE — 2580000003 HC RX 258: Performed by: INTERNAL MEDICINE

## 2023-05-01 PROCEDURE — 80053 COMPREHEN METABOLIC PANEL: CPT

## 2023-05-01 PROCEDURE — 82803 BLOOD GASES ANY COMBINATION: CPT

## 2023-05-01 PROCEDURE — 94640 AIRWAY INHALATION TREATMENT: CPT

## 2023-05-01 PROCEDURE — 93010 ELECTROCARDIOGRAM REPORT: CPT | Performed by: INTERNAL MEDICINE

## 2023-05-01 PROCEDURE — 5A09557 ASSISTANCE WITH RESPIRATORY VENTILATION, GREATER THAN 96 CONSECUTIVE HOURS, CONTINUOUS POSITIVE AIRWAY PRESSURE: ICD-10-PCS | Performed by: INTERNAL MEDICINE

## 2023-05-01 PROCEDURE — 84484 ASSAY OF TROPONIN QUANT: CPT

## 2023-05-01 PROCEDURE — 71045 X-RAY EXAM CHEST 1 VIEW: CPT

## 2023-05-01 PROCEDURE — 6370000000 HC RX 637 (ALT 250 FOR IP): Performed by: INTERNAL MEDICINE

## 2023-05-01 PROCEDURE — 1200000000 HC SEMI PRIVATE

## 2023-05-01 PROCEDURE — 81001 URINALYSIS AUTO W/SCOPE: CPT

## 2023-05-01 PROCEDURE — 83880 ASSAY OF NATRIURETIC PEPTIDE: CPT

## 2023-05-01 PROCEDURE — 99285 EMERGENCY DEPT VISIT HI MDM: CPT

## 2023-05-01 PROCEDURE — 36415 COLL VENOUS BLD VENIPUNCTURE: CPT

## 2023-05-01 PROCEDURE — 93005 ELECTROCARDIOGRAM TRACING: CPT | Performed by: NURSE PRACTITIONER

## 2023-05-01 PROCEDURE — 71250 CT THORAX DX C-: CPT

## 2023-05-01 RX ORDER — ONDANSETRON 2 MG/ML
4 INJECTION INTRAMUSCULAR; INTRAVENOUS EVERY 6 HOURS PRN
Status: DISCONTINUED | OUTPATIENT
Start: 2023-05-01 | End: 2023-05-11 | Stop reason: HOSPADM

## 2023-05-01 RX ORDER — DULOXETIN HYDROCHLORIDE 60 MG/1
60 CAPSULE, DELAYED RELEASE ORAL DAILY
Status: DISCONTINUED | OUTPATIENT
Start: 2023-05-02 | End: 2023-05-11 | Stop reason: HOSPADM

## 2023-05-01 RX ORDER — ISOSORBIDE DINITRATE 20 MG/1
20 TABLET ORAL 3 TIMES DAILY
Status: DISCONTINUED | OUTPATIENT
Start: 2023-05-01 | End: 2023-05-11 | Stop reason: HOSPADM

## 2023-05-01 RX ORDER — PRAVASTATIN SODIUM 40 MG
40 TABLET ORAL DAILY
Status: DISCONTINUED | OUTPATIENT
Start: 2023-05-02 | End: 2023-05-11 | Stop reason: HOSPADM

## 2023-05-01 RX ORDER — SODIUM CHLORIDE 0.9 % (FLUSH) 0.9 %
5-40 SYRINGE (ML) INJECTION EVERY 12 HOURS SCHEDULED
Status: DISCONTINUED | OUTPATIENT
Start: 2023-05-01 | End: 2023-05-11 | Stop reason: HOSPADM

## 2023-05-01 RX ORDER — METOPROLOL SUCCINATE 50 MG/1
100 TABLET, EXTENDED RELEASE ORAL 2 TIMES DAILY
Status: DISCONTINUED | OUTPATIENT
Start: 2023-05-01 | End: 2023-05-11 | Stop reason: HOSPADM

## 2023-05-01 RX ORDER — IPRATROPIUM BROMIDE AND ALBUTEROL SULFATE 2.5; .5 MG/3ML; MG/3ML
1 SOLUTION RESPIRATORY (INHALATION) ONCE
Status: COMPLETED | OUTPATIENT
Start: 2023-05-01 | End: 2023-05-01

## 2023-05-01 RX ORDER — CYCLOBENZAPRINE HCL 10 MG
5 TABLET ORAL 3 TIMES DAILY
Status: DISCONTINUED | OUTPATIENT
Start: 2023-05-01 | End: 2023-05-02

## 2023-05-01 RX ORDER — TORSEMIDE 100 MG/1
100 TABLET ORAL DAILY
Status: DISCONTINUED | OUTPATIENT
Start: 2023-05-02 | End: 2023-05-11 | Stop reason: HOSPADM

## 2023-05-01 RX ORDER — ONDANSETRON 4 MG/1
4 TABLET, ORALLY DISINTEGRATING ORAL EVERY 8 HOURS PRN
Status: DISCONTINUED | OUTPATIENT
Start: 2023-05-01 | End: 2023-05-11 | Stop reason: HOSPADM

## 2023-05-01 RX ORDER — SENNA AND DOCUSATE SODIUM 50; 8.6 MG/1; MG/1
1 TABLET, FILM COATED ORAL PRN
Status: DISCONTINUED | OUTPATIENT
Start: 2023-05-01 | End: 2023-05-11 | Stop reason: HOSPADM

## 2023-05-01 RX ORDER — SODIUM CHLORIDE 9 MG/ML
INJECTION, SOLUTION INTRAVENOUS PRN
Status: DISCONTINUED | OUTPATIENT
Start: 2023-05-01 | End: 2023-05-11 | Stop reason: HOSPADM

## 2023-05-01 RX ORDER — QUETIAPINE FUMARATE 25 MG/1
50 TABLET, FILM COATED ORAL NIGHTLY
Status: DISCONTINUED | OUTPATIENT
Start: 2023-05-01 | End: 2023-05-11 | Stop reason: HOSPADM

## 2023-05-01 RX ORDER — CLOPIDOGREL BISULFATE 75 MG/1
75 TABLET ORAL DAILY
Status: DISCONTINUED | OUTPATIENT
Start: 2023-05-02 | End: 2023-05-11 | Stop reason: HOSPADM

## 2023-05-01 RX ORDER — CALCIUM ACETATE 667 MG/1
1 CAPSULE ORAL
Status: DISCONTINUED | OUTPATIENT
Start: 2023-05-02 | End: 2023-05-11 | Stop reason: HOSPADM

## 2023-05-01 RX ORDER — POLYETHYLENE GLYCOL 3350 17 G/17G
17 POWDER, FOR SOLUTION ORAL DAILY PRN
Status: DISCONTINUED | OUTPATIENT
Start: 2023-05-01 | End: 2023-05-11 | Stop reason: HOSPADM

## 2023-05-01 RX ORDER — ACETAMINOPHEN 325 MG/1
650 TABLET ORAL EVERY 6 HOURS PRN
Status: DISCONTINUED | OUTPATIENT
Start: 2023-05-01 | End: 2023-05-11 | Stop reason: HOSPADM

## 2023-05-01 RX ORDER — PANTOPRAZOLE SODIUM 40 MG/1
40 TABLET, DELAYED RELEASE ORAL
Status: DISCONTINUED | OUTPATIENT
Start: 2023-05-02 | End: 2023-05-11 | Stop reason: HOSPADM

## 2023-05-01 RX ORDER — NITROGLYCERIN 0.4 MG/1
0.4 TABLET SUBLINGUAL EVERY 5 MIN PRN
Status: DISCONTINUED | OUTPATIENT
Start: 2023-05-01 | End: 2023-05-11 | Stop reason: HOSPADM

## 2023-05-01 RX ORDER — ACETAMINOPHEN 650 MG/1
650 SUPPOSITORY RECTAL EVERY 6 HOURS PRN
Status: DISCONTINUED | OUTPATIENT
Start: 2023-05-01 | End: 2023-05-11 | Stop reason: HOSPADM

## 2023-05-01 RX ORDER — SODIUM CHLORIDE 0.9 % (FLUSH) 0.9 %
5-40 SYRINGE (ML) INJECTION PRN
Status: DISCONTINUED | OUTPATIENT
Start: 2023-05-01 | End: 2023-05-11 | Stop reason: HOSPADM

## 2023-05-01 RX ORDER — CALCIUM CARBONATE 200(500)MG
1 TABLET,CHEWABLE ORAL 3 TIMES DAILY PRN
Status: DISCONTINUED | OUTPATIENT
Start: 2023-05-01 | End: 2023-05-11 | Stop reason: HOSPADM

## 2023-05-01 RX ORDER — GABAPENTIN 100 MG/1
200 CAPSULE ORAL 3 TIMES DAILY PRN
Status: DISCONTINUED | OUTPATIENT
Start: 2023-05-01 | End: 2023-05-02

## 2023-05-01 RX ORDER — IPRATROPIUM BROMIDE AND ALBUTEROL SULFATE 2.5; .5 MG/3ML; MG/3ML
1 SOLUTION RESPIRATORY (INHALATION) EVERY 6 HOURS PRN
Status: DISCONTINUED | OUTPATIENT
Start: 2023-05-01 | End: 2023-05-11 | Stop reason: HOSPADM

## 2023-05-01 RX ADMIN — QUETIAPINE FUMARATE 50 MG: 25 TABLET ORAL at 21:44

## 2023-05-01 RX ADMIN — IPRATROPIUM BROMIDE AND ALBUTEROL SULFATE 1 AMPULE: .5; 3 SOLUTION RESPIRATORY (INHALATION) at 12:26

## 2023-05-01 RX ADMIN — APIXABAN 2.5 MG: 2.5 TABLET, FILM COATED ORAL at 21:44

## 2023-05-01 RX ADMIN — ISOSORBIDE DINITRATE 20 MG: 20 TABLET ORAL at 21:44

## 2023-05-01 RX ADMIN — SACUBITRIL AND VALSARTAN 1 TABLET: 24; 26 TABLET, FILM COATED ORAL at 21:44

## 2023-05-01 RX ADMIN — SODIUM CHLORIDE, PRESERVATIVE FREE 10 ML: 5 INJECTION INTRAVENOUS at 21:45

## 2023-05-01 RX ADMIN — METOPROLOL SUCCINATE 100 MG: 50 TABLET, EXTENDED RELEASE ORAL at 21:44

## 2023-05-01 ASSESSMENT — PAIN SCALES - GENERAL
PAINLEVEL_OUTOF10: 8
PAINLEVEL_OUTOF10: 8

## 2023-05-01 ASSESSMENT — PAIN DESCRIPTION - ORIENTATION: ORIENTATION: LOWER

## 2023-05-01 ASSESSMENT — PAIN DESCRIPTION - LOCATION: LOCATION: BACK

## 2023-05-01 ASSESSMENT — PAIN - FUNCTIONAL ASSESSMENT: PAIN_FUNCTIONAL_ASSESSMENT: 0-10

## 2023-05-02 DIAGNOSIS — G89.4 CHRONIC PAIN SYNDROME: ICD-10-CM

## 2023-05-02 PROCEDURE — 2580000003 HC RX 258: Performed by: INTERNAL MEDICINE

## 2023-05-02 PROCEDURE — 90935 HEMODIALYSIS ONE EVALUATION: CPT

## 2023-05-02 PROCEDURE — 2700000000 HC OXYGEN THERAPY PER DAY

## 2023-05-02 PROCEDURE — 6370000000 HC RX 637 (ALT 250 FOR IP): Performed by: INTERNAL MEDICINE

## 2023-05-02 PROCEDURE — 94761 N-INVAS EAR/PLS OXIMETRY MLT: CPT

## 2023-05-02 PROCEDURE — 94660 CPAP INITIATION&MGMT: CPT

## 2023-05-02 PROCEDURE — 1200000000 HC SEMI PRIVATE

## 2023-05-02 PROCEDURE — 6370000000 HC RX 637 (ALT 250 FOR IP): Performed by: NURSE PRACTITIONER

## 2023-05-02 PROCEDURE — 2500000003 HC RX 250 WO HCPCS: Performed by: INTERNAL MEDICINE

## 2023-05-02 RX ORDER — CYCLOBENZAPRINE HCL 10 MG
TABLET ORAL
Qty: 90 TABLET | Refills: 10 | OUTPATIENT
Start: 2023-05-02

## 2023-05-02 RX ORDER — LOPERAMIDE HYDROCHLORIDE 2 MG/1
2 CAPSULE ORAL 4 TIMES DAILY PRN
Status: DISCONTINUED | OUTPATIENT
Start: 2023-05-02 | End: 2023-05-11 | Stop reason: HOSPADM

## 2023-05-02 RX ORDER — GABAPENTIN 100 MG/1
100 CAPSULE ORAL 3 TIMES DAILY
Status: ON HOLD | COMMUNITY
End: 2023-05-11 | Stop reason: HOSPADM

## 2023-05-02 RX ORDER — CYCLOBENZAPRINE HCL 10 MG
10 TABLET ORAL 3 TIMES DAILY PRN
Status: DISCONTINUED | OUTPATIENT
Start: 2023-05-02 | End: 2023-05-11 | Stop reason: HOSPADM

## 2023-05-02 RX ORDER — GABAPENTIN 100 MG/1
100 CAPSULE ORAL 3 TIMES DAILY PRN
Status: DISCONTINUED | OUTPATIENT
Start: 2023-05-02 | End: 2023-05-11 | Stop reason: HOSPADM

## 2023-05-02 RX ORDER — OXYCODONE AND ACETAMINOPHEN 7.5; 325 MG/1; MG/1
1 TABLET ORAL ONCE
Status: COMPLETED | OUTPATIENT
Start: 2023-05-02 | End: 2023-05-02

## 2023-05-02 RX ADMIN — QUETIAPINE FUMARATE 50 MG: 25 TABLET ORAL at 21:03

## 2023-05-02 RX ADMIN — CLOPIDOGREL BISULFATE 75 MG: 75 TABLET ORAL at 09:18

## 2023-05-02 RX ADMIN — SACUBITRIL AND VALSARTAN 1 TABLET: 24; 26 TABLET, FILM COATED ORAL at 21:03

## 2023-05-02 RX ADMIN — TORSEMIDE 100 MG: 100 TABLET ORAL at 09:18

## 2023-05-02 RX ADMIN — SODIUM CHLORIDE, PRESERVATIVE FREE 10 ML: 5 INJECTION INTRAVENOUS at 21:04

## 2023-05-02 RX ADMIN — CYCLOBENZAPRINE 10 MG: 10 TABLET, FILM COATED ORAL at 21:04

## 2023-05-02 RX ADMIN — SACUBITRIL AND VALSARTAN 1 TABLET: 24; 26 TABLET, FILM COATED ORAL at 09:17

## 2023-05-02 RX ADMIN — PANTOPRAZOLE SODIUM 40 MG: 40 TABLET, DELAYED RELEASE ORAL at 05:08

## 2023-05-02 RX ADMIN — OXYCODONE AND ACETAMINOPHEN 1 TABLET: 7.5; 325 TABLET ORAL at 21:04

## 2023-05-02 RX ADMIN — ISOSORBIDE DINITRATE 20 MG: 20 TABLET ORAL at 21:03

## 2023-05-02 RX ADMIN — SODIUM CHLORIDE, PRESERVATIVE FREE 10 ML: 5 INJECTION INTRAVENOUS at 11:18

## 2023-05-02 RX ADMIN — CALCIUM ACETATE 667 MG: 667 CAPSULE ORAL at 18:22

## 2023-05-02 RX ADMIN — METOPROLOL SUCCINATE 100 MG: 50 TABLET, EXTENDED RELEASE ORAL at 09:18

## 2023-05-02 RX ADMIN — GABAPENTIN 100 MG: 100 CAPSULE ORAL at 21:03

## 2023-05-02 RX ADMIN — GABAPENTIN 100 MG: 100 CAPSULE ORAL at 12:17

## 2023-05-02 RX ADMIN — APIXABAN 2.5 MG: 2.5 TABLET, FILM COATED ORAL at 21:04

## 2023-05-02 RX ADMIN — APIXABAN 2.5 MG: 2.5 TABLET, FILM COATED ORAL at 09:18

## 2023-05-02 RX ADMIN — DULOXETINE HYDROCHLORIDE 60 MG: 60 CAPSULE, DELAYED RELEASE ORAL at 09:17

## 2023-05-02 RX ADMIN — METOPROLOL SUCCINATE 100 MG: 50 TABLET, EXTENDED RELEASE ORAL at 21:03

## 2023-05-02 RX ADMIN — ISOSORBIDE DINITRATE 20 MG: 20 TABLET ORAL at 16:18

## 2023-05-02 RX ADMIN — ISOSORBIDE DINITRATE 20 MG: 20 TABLET ORAL at 09:17

## 2023-05-02 RX ADMIN — ACETAMINOPHEN 650 MG: 325 TABLET ORAL at 10:13

## 2023-05-02 RX ADMIN — LOPERAMIDE HYDROCHLORIDE 2 MG: 2 CAPSULE ORAL at 18:22

## 2023-05-02 RX ADMIN — PRAVASTATIN SODIUM 40 MG: 40 TABLET ORAL at 09:18

## 2023-05-02 ASSESSMENT — PAIN DESCRIPTION - FREQUENCY: FREQUENCY: CONTINUOUS

## 2023-05-02 ASSESSMENT — PAIN DESCRIPTION - DESCRIPTORS: DESCRIPTORS: ACHING

## 2023-05-02 ASSESSMENT — PAIN SCALES - GENERAL
PAINLEVEL_OUTOF10: 8
PAINLEVEL_OUTOF10: 8
PAINLEVEL_OUTOF10: 7
PAINLEVEL_OUTOF10: 8
PAINLEVEL_OUTOF10: 8

## 2023-05-02 ASSESSMENT — PAIN - FUNCTIONAL ASSESSMENT: PAIN_FUNCTIONAL_ASSESSMENT: PREVENTS OR INTERFERES SOME ACTIVE ACTIVITIES AND ADLS

## 2023-05-02 ASSESSMENT — PAIN DESCRIPTION - ORIENTATION
ORIENTATION: LOWER

## 2023-05-02 ASSESSMENT — PAIN DESCRIPTION - LOCATION
LOCATION: BACK

## 2023-05-02 ASSESSMENT — ENCOUNTER SYMPTOMS
SHORTNESS OF BREATH: 1
TROUBLE SWALLOWING: 0
ABDOMINAL DISTENTION: 1
WHEEZING: 1

## 2023-05-02 ASSESSMENT — PAIN DESCRIPTION - ONSET: ONSET: ON-GOING

## 2023-05-03 ENCOUNTER — APPOINTMENT (OUTPATIENT)
Dept: GENERAL RADIOLOGY | Age: 53
DRG: 246 | End: 2023-05-03
Payer: MEDICARE

## 2023-05-03 LAB
ANION GAP SERPL CALCULATED.3IONS-SCNC: 18 MMOL/L (ref 3–16)
BUN SERPL-MCNC: 72 MG/DL (ref 7–20)
CALCIUM SERPL-MCNC: 8.3 MG/DL (ref 8.3–10.6)
CHLORIDE SERPL-SCNC: 84 MMOL/L (ref 99–110)
CO2 SERPL-SCNC: 20 MMOL/L (ref 21–32)
CREAT SERPL-MCNC: 8.4 MG/DL (ref 0.9–1.3)
DEPRECATED RDW RBC AUTO: 17 % (ref 12.4–15.4)
GFR SERPLBLD CREATININE-BSD FMLA CKD-EPI: 7 ML/MIN/{1.73_M2}
GLUCOSE SERPL-MCNC: 264 MG/DL (ref 70–99)
HCT VFR BLD AUTO: 34.4 % (ref 40.5–52.5)
HGB BLD-MCNC: 11.2 G/DL (ref 13.5–17.5)
IRON SATN MFR SERPL: 27 % (ref 20–50)
IRON SERPL-MCNC: 54 UG/DL (ref 59–158)
MCH RBC QN AUTO: 29.2 PG (ref 26–34)
MCHC RBC AUTO-ENTMCNC: 32.6 G/DL (ref 31–36)
MCV RBC AUTO: 89.6 FL (ref 80–100)
PLATELET # BLD AUTO: 227 K/UL (ref 135–450)
PMV BLD AUTO: 7.8 FL (ref 5–10.5)
POTASSIUM SERPL-SCNC: 5.9 MMOL/L (ref 3.5–5.1)
RBC # BLD AUTO: 3.84 M/UL (ref 4.2–5.9)
SODIUM SERPL-SCNC: 122 MMOL/L (ref 136–145)
TIBC SERPL-MCNC: 197 UG/DL (ref 260–445)
WBC # BLD AUTO: 8.7 K/UL (ref 4–11)

## 2023-05-03 PROCEDURE — 2500000003 HC RX 250 WO HCPCS: Performed by: INTERNAL MEDICINE

## 2023-05-03 PROCEDURE — 94660 CPAP INITIATION&MGMT: CPT

## 2023-05-03 PROCEDURE — 6370000000 HC RX 637 (ALT 250 FOR IP): Performed by: INTERNAL MEDICINE

## 2023-05-03 PROCEDURE — 83550 IRON BINDING TEST: CPT

## 2023-05-03 PROCEDURE — 73630 X-RAY EXAM OF FOOT: CPT

## 2023-05-03 PROCEDURE — 80048 BASIC METABOLIC PNL TOTAL CA: CPT

## 2023-05-03 PROCEDURE — 1200000000 HC SEMI PRIVATE

## 2023-05-03 PROCEDURE — 94761 N-INVAS EAR/PLS OXIMETRY MLT: CPT

## 2023-05-03 PROCEDURE — 2700000000 HC OXYGEN THERAPY PER DAY

## 2023-05-03 PROCEDURE — 90935 HEMODIALYSIS ONE EVALUATION: CPT

## 2023-05-03 PROCEDURE — 85027 COMPLETE CBC AUTOMATED: CPT

## 2023-05-03 PROCEDURE — 2580000003 HC RX 258: Performed by: INTERNAL MEDICINE

## 2023-05-03 PROCEDURE — 83540 ASSAY OF IRON: CPT

## 2023-05-03 RX ORDER — ACETAMINOPHEN 650 MG
TABLET, EXTENDED RELEASE ORAL DAILY
Status: DISCONTINUED | OUTPATIENT
Start: 2023-05-04 | End: 2023-05-11 | Stop reason: HOSPADM

## 2023-05-03 RX ADMIN — ISOSORBIDE DINITRATE 20 MG: 20 TABLET ORAL at 17:28

## 2023-05-03 RX ADMIN — ISOSORBIDE DINITRATE 20 MG: 20 TABLET ORAL at 21:19

## 2023-05-03 RX ADMIN — SACUBITRIL AND VALSARTAN 1 TABLET: 24; 26 TABLET, FILM COATED ORAL at 21:16

## 2023-05-03 RX ADMIN — METOPROLOL SUCCINATE 100 MG: 50 TABLET, EXTENDED RELEASE ORAL at 21:19

## 2023-05-03 RX ADMIN — APIXABAN 2.5 MG: 2.5 TABLET, FILM COATED ORAL at 21:17

## 2023-05-03 RX ADMIN — PRAVASTATIN SODIUM 40 MG: 40 TABLET ORAL at 17:28

## 2023-05-03 RX ADMIN — CLOPIDOGREL BISULFATE 75 MG: 75 TABLET ORAL at 17:28

## 2023-05-03 RX ADMIN — QUETIAPINE FUMARATE 50 MG: 25 TABLET ORAL at 21:16

## 2023-05-03 RX ADMIN — CYCLOBENZAPRINE 10 MG: 10 TABLET, FILM COATED ORAL at 21:17

## 2023-05-03 RX ADMIN — DULOXETINE HYDROCHLORIDE 60 MG: 60 CAPSULE, DELAYED RELEASE ORAL at 17:26

## 2023-05-03 RX ADMIN — SODIUM CHLORIDE, PRESERVATIVE FREE 10 ML: 5 INJECTION INTRAVENOUS at 21:17

## 2023-05-03 RX ADMIN — GABAPENTIN 100 MG: 100 CAPSULE ORAL at 21:17

## 2023-05-03 RX ADMIN — CALCIUM ACETATE 667 MG: 667 CAPSULE ORAL at 17:27

## 2023-05-03 RX ADMIN — METOPROLOL SUCCINATE 100 MG: 50 TABLET, EXTENDED RELEASE ORAL at 17:27

## 2023-05-03 RX ADMIN — SODIUM CHLORIDE, PRESERVATIVE FREE 10 ML: 5 INJECTION INTRAVENOUS at 09:56

## 2023-05-03 RX ADMIN — TORSEMIDE 100 MG: 100 TABLET ORAL at 17:28

## 2023-05-03 ASSESSMENT — PAIN SCALES - GENERAL
PAINLEVEL_OUTOF10: 7
PAINLEVEL_OUTOF10: 5
PAINLEVEL_OUTOF10: 7
PAINLEVEL_OUTOF10: 7

## 2023-05-03 ASSESSMENT — PAIN DESCRIPTION - ORIENTATION
ORIENTATION: LOWER
ORIENTATION: LOWER

## 2023-05-03 ASSESSMENT — PAIN DESCRIPTION - LOCATION
LOCATION: BACK
LOCATION: BACK;FOOT
LOCATION: BACK
LOCATION: BACK

## 2023-05-04 ENCOUNTER — APPOINTMENT (OUTPATIENT)
Dept: VASCULAR LAB | Age: 53
DRG: 246 | End: 2023-05-04
Payer: MEDICARE

## 2023-05-04 DIAGNOSIS — G89.4 CHRONIC PAIN SYNDROME: ICD-10-CM

## 2023-05-04 LAB
ALBUMIN SERPL-MCNC: 3.3 G/DL (ref 3.4–5)
ANION GAP SERPL CALCULATED.3IONS-SCNC: 18 MMOL/L (ref 3–16)
BUN SERPL-MCNC: 57 MG/DL (ref 7–20)
CALCIUM SERPL-MCNC: 8.3 MG/DL (ref 8.3–10.6)
CHLORIDE SERPL-SCNC: 85 MMOL/L (ref 99–110)
CO2 SERPL-SCNC: 16 MMOL/L (ref 21–32)
CREAT SERPL-MCNC: 6.6 MG/DL (ref 0.9–1.3)
DEPRECATED RDW RBC AUTO: 16.8 % (ref 12.4–15.4)
GFR SERPLBLD CREATININE-BSD FMLA CKD-EPI: 9 ML/MIN/{1.73_M2}
GLUCOSE SERPL-MCNC: 238 MG/DL (ref 70–99)
HCT VFR BLD AUTO: 29.1 % (ref 40.5–52.5)
HGB BLD-MCNC: 9.1 G/DL (ref 13.5–17.5)
MCH RBC QN AUTO: 28.7 PG (ref 26–34)
MCHC RBC AUTO-ENTMCNC: 31.2 G/DL (ref 31–36)
MCV RBC AUTO: 92 FL (ref 80–100)
PHOSPHATE SERPL-MCNC: 6.2 MG/DL (ref 2.5–4.9)
PLATELET # BLD AUTO: 188 K/UL (ref 135–450)
PMV BLD AUTO: 8 FL (ref 5–10.5)
POTASSIUM SERPL-SCNC: 5.6 MMOL/L (ref 3.5–5.1)
RBC # BLD AUTO: 3.16 M/UL (ref 4.2–5.9)
SODIUM SERPL-SCNC: 119 MMOL/L (ref 136–145)
WBC # BLD AUTO: 7.1 K/UL (ref 4–11)

## 2023-05-04 PROCEDURE — 2500000003 HC RX 250 WO HCPCS: Performed by: INTERNAL MEDICINE

## 2023-05-04 PROCEDURE — 80069 RENAL FUNCTION PANEL: CPT

## 2023-05-04 PROCEDURE — 94761 N-INVAS EAR/PLS OXIMETRY MLT: CPT

## 2023-05-04 PROCEDURE — 6370000000 HC RX 637 (ALT 250 FOR IP): Performed by: INTERNAL MEDICINE

## 2023-05-04 PROCEDURE — 2580000003 HC RX 258: Performed by: INTERNAL MEDICINE

## 2023-05-04 PROCEDURE — 1200000000 HC SEMI PRIVATE

## 2023-05-04 PROCEDURE — 94660 CPAP INITIATION&MGMT: CPT

## 2023-05-04 PROCEDURE — 85027 COMPLETE CBC AUTOMATED: CPT

## 2023-05-04 PROCEDURE — 2700000000 HC OXYGEN THERAPY PER DAY

## 2023-05-04 PROCEDURE — 36415 COLL VENOUS BLD VENIPUNCTURE: CPT

## 2023-05-04 PROCEDURE — 93925 LOWER EXTREMITY STUDY: CPT

## 2023-05-04 RX ORDER — CYCLOBENZAPRINE HCL 10 MG
TABLET ORAL
Qty: 90 TABLET | Refills: 10 | OUTPATIENT
Start: 2023-05-04

## 2023-05-04 RX ADMIN — APIXABAN 2.5 MG: 2.5 TABLET, FILM COATED ORAL at 21:12

## 2023-05-04 RX ADMIN — GABAPENTIN 100 MG: 100 CAPSULE ORAL at 21:12

## 2023-05-04 RX ADMIN — CALCIUM ACETATE 667 MG: 667 CAPSULE ORAL at 17:36

## 2023-05-04 RX ADMIN — CYCLOBENZAPRINE 10 MG: 10 TABLET, FILM COATED ORAL at 13:55

## 2023-05-04 RX ADMIN — SACUBITRIL AND VALSARTAN 1 TABLET: 24; 26 TABLET, FILM COATED ORAL at 21:11

## 2023-05-04 RX ADMIN — DULOXETINE HYDROCHLORIDE 60 MG: 60 CAPSULE, DELAYED RELEASE ORAL at 08:30

## 2023-05-04 RX ADMIN — PANTOPRAZOLE SODIUM 40 MG: 40 TABLET, DELAYED RELEASE ORAL at 08:29

## 2023-05-04 RX ADMIN — SODIUM CHLORIDE, PRESERVATIVE FREE 10 ML: 5 INJECTION INTRAVENOUS at 08:29

## 2023-05-04 RX ADMIN — ISOSORBIDE DINITRATE 20 MG: 20 TABLET ORAL at 13:45

## 2023-05-04 RX ADMIN — ISOSORBIDE DINITRATE 20 MG: 20 TABLET ORAL at 21:12

## 2023-05-04 RX ADMIN — APIXABAN 2.5 MG: 2.5 TABLET, FILM COATED ORAL at 08:30

## 2023-05-04 RX ADMIN — METOPROLOL SUCCINATE 100 MG: 50 TABLET, EXTENDED RELEASE ORAL at 21:12

## 2023-05-04 RX ADMIN — QUETIAPINE FUMARATE 50 MG: 25 TABLET ORAL at 21:12

## 2023-05-04 RX ADMIN — GABAPENTIN 100 MG: 100 CAPSULE ORAL at 13:55

## 2023-05-04 RX ADMIN — Medication: at 23:07

## 2023-05-04 RX ADMIN — ACETAMINOPHEN 650 MG: 325 TABLET ORAL at 13:55

## 2023-05-04 RX ADMIN — CYCLOBENZAPRINE 10 MG: 10 TABLET, FILM COATED ORAL at 21:11

## 2023-05-04 RX ADMIN — CALCIUM ACETATE 667 MG: 667 CAPSULE ORAL at 11:55

## 2023-05-04 RX ADMIN — SODIUM CHLORIDE, PRESERVATIVE FREE 5 ML: 5 INJECTION INTRAVENOUS at 23:08

## 2023-05-04 RX ADMIN — CLOPIDOGREL BISULFATE 75 MG: 75 TABLET ORAL at 08:30

## 2023-05-04 RX ADMIN — PRAVASTATIN SODIUM 40 MG: 40 TABLET ORAL at 08:29

## 2023-05-04 RX ADMIN — CALCIUM ACETATE 667 MG: 667 CAPSULE ORAL at 08:30

## 2023-05-04 ASSESSMENT — PAIN DESCRIPTION - LOCATION
LOCATION: BACK
LOCATION: BACK

## 2023-05-04 ASSESSMENT — PAIN SCALES - GENERAL
PAINLEVEL_OUTOF10: 9
PAINLEVEL_OUTOF10: 9
PAINLEVEL_OUTOF10: 0

## 2023-05-05 ENCOUNTER — APPOINTMENT (OUTPATIENT)
Dept: CT IMAGING | Age: 53
DRG: 246 | End: 2023-05-05
Payer: MEDICARE

## 2023-05-05 LAB
ANION GAP SERPL CALCULATED.3IONS-SCNC: 11 MMOL/L (ref 3–16)
BUN SERPL-MCNC: 27 MG/DL (ref 7–20)
CALCIUM SERPL-MCNC: 8 MG/DL (ref 8.3–10.6)
CHLORIDE SERPL-SCNC: 89 MMOL/L (ref 99–110)
CO2 SERPL-SCNC: 24 MMOL/L (ref 21–32)
CREAT SERPL-MCNC: 3.6 MG/DL (ref 0.9–1.3)
CRP SERPL-MCNC: 43.8 MG/L (ref 0–5.1)
DEPRECATED RDW RBC AUTO: 16.8 % (ref 12.4–15.4)
ERYTHROCYTE [SEDIMENTATION RATE] IN BLOOD BY WESTERGREN METHOD: 67 MM/HR (ref 0–20)
GFR SERPLBLD CREATININE-BSD FMLA CKD-EPI: 19 ML/MIN/{1.73_M2}
GLUCOSE SERPL-MCNC: 149 MG/DL (ref 70–99)
HCT VFR BLD AUTO: 33.9 % (ref 40.5–52.5)
HGB BLD-MCNC: 11.1 G/DL (ref 13.5–17.5)
MCH RBC QN AUTO: 28.8 PG (ref 26–34)
MCHC RBC AUTO-ENTMCNC: 32.8 G/DL (ref 31–36)
MCV RBC AUTO: 88 FL (ref 80–100)
PLATELET # BLD AUTO: 208 K/UL (ref 135–450)
PMV BLD AUTO: 7.5 FL (ref 5–10.5)
POTASSIUM SERPL-SCNC: 3.7 MMOL/L (ref 3.5–5.1)
RBC # BLD AUTO: 3.85 M/UL (ref 4.2–5.9)
SODIUM SERPL-SCNC: 124 MMOL/L (ref 136–145)
WBC # BLD AUTO: 8.6 K/UL (ref 4–11)

## 2023-05-05 PROCEDURE — 86140 C-REACTIVE PROTEIN: CPT

## 2023-05-05 PROCEDURE — 6370000000 HC RX 637 (ALT 250 FOR IP): Performed by: INTERNAL MEDICINE

## 2023-05-05 PROCEDURE — 85027 COMPLETE CBC AUTOMATED: CPT

## 2023-05-05 PROCEDURE — 94761 N-INVAS EAR/PLS OXIMETRY MLT: CPT

## 2023-05-05 PROCEDURE — 2500000003 HC RX 250 WO HCPCS: Performed by: INTERNAL MEDICINE

## 2023-05-05 PROCEDURE — 1200000000 HC SEMI PRIVATE

## 2023-05-05 PROCEDURE — 99254 IP/OBS CNSLTJ NEW/EST MOD 60: CPT | Performed by: SURGERY

## 2023-05-05 PROCEDURE — 90935 HEMODIALYSIS ONE EVALUATION: CPT

## 2023-05-05 PROCEDURE — 80048 BASIC METABOLIC PNL TOTAL CA: CPT

## 2023-05-05 PROCEDURE — 6360000004 HC RX CONTRAST MEDICATION: Performed by: SURGERY

## 2023-05-05 PROCEDURE — 85652 RBC SED RATE AUTOMATED: CPT

## 2023-05-05 PROCEDURE — 2580000003 HC RX 258: Performed by: INTERNAL MEDICINE

## 2023-05-05 PROCEDURE — 75635 CT ANGIO ABDOMINAL ARTERIES: CPT

## 2023-05-05 PROCEDURE — 94660 CPAP INITIATION&MGMT: CPT

## 2023-05-05 PROCEDURE — 2700000000 HC OXYGEN THERAPY PER DAY

## 2023-05-05 RX ADMIN — APIXABAN 2.5 MG: 2.5 TABLET, FILM COATED ORAL at 21:41

## 2023-05-05 RX ADMIN — DULOXETINE HYDROCHLORIDE 60 MG: 60 CAPSULE, DELAYED RELEASE ORAL at 13:58

## 2023-05-05 RX ADMIN — SODIUM CHLORIDE, PRESERVATIVE FREE 10 ML: 5 INJECTION INTRAVENOUS at 21:42

## 2023-05-05 RX ADMIN — METOPROLOL SUCCINATE 100 MG: 50 TABLET, EXTENDED RELEASE ORAL at 21:42

## 2023-05-05 RX ADMIN — CLOPIDOGREL BISULFATE 75 MG: 75 TABLET ORAL at 13:57

## 2023-05-05 RX ADMIN — QUETIAPINE FUMARATE 50 MG: 25 TABLET ORAL at 21:41

## 2023-05-05 RX ADMIN — SACUBITRIL AND VALSARTAN 1 TABLET: 24; 26 TABLET, FILM COATED ORAL at 21:41

## 2023-05-05 RX ADMIN — CALCIUM ACETATE 667 MG: 667 CAPSULE ORAL at 13:57

## 2023-05-05 RX ADMIN — ISOSORBIDE DINITRATE 20 MG: 20 TABLET ORAL at 13:57

## 2023-05-05 RX ADMIN — CYCLOBENZAPRINE 10 MG: 10 TABLET, FILM COATED ORAL at 13:57

## 2023-05-05 RX ADMIN — TORSEMIDE 100 MG: 100 TABLET ORAL at 13:58

## 2023-05-05 RX ADMIN — IOPAMIDOL 100 ML: 755 INJECTION, SOLUTION INTRAVENOUS at 16:23

## 2023-05-05 RX ADMIN — ISOSORBIDE DINITRATE 20 MG: 20 TABLET ORAL at 21:41

## 2023-05-05 RX ADMIN — CALCIUM ACETATE 667 MG: 667 CAPSULE ORAL at 17:33

## 2023-05-05 RX ADMIN — SACUBITRIL AND VALSARTAN 1 TABLET: 24; 26 TABLET, FILM COATED ORAL at 13:58

## 2023-05-05 RX ADMIN — PRAVASTATIN SODIUM 40 MG: 40 TABLET ORAL at 13:57

## 2023-05-05 RX ADMIN — METOPROLOL SUCCINATE 100 MG: 50 TABLET, EXTENDED RELEASE ORAL at 13:58

## 2023-05-05 RX ADMIN — APIXABAN 2.5 MG: 2.5 TABLET, FILM COATED ORAL at 13:57

## 2023-05-05 RX ADMIN — PANTOPRAZOLE SODIUM 40 MG: 40 TABLET, DELAYED RELEASE ORAL at 05:16

## 2023-05-05 RX ADMIN — Medication: at 21:43

## 2023-05-05 ASSESSMENT — PAIN SCALES - GENERAL: PAINLEVEL_OUTOF10: 8

## 2023-05-05 ASSESSMENT — PAIN DESCRIPTION - LOCATION: LOCATION: BACK

## 2023-05-06 LAB
ALBUMIN SERPL-MCNC: 3.6 G/DL (ref 3.4–5)
ANION GAP SERPL CALCULATED.3IONS-SCNC: 16 MMOL/L (ref 3–16)
BUN SERPL-MCNC: 58 MG/DL (ref 7–20)
CALCIUM SERPL-MCNC: 8 MG/DL (ref 8.3–10.6)
CHLORIDE SERPL-SCNC: 82 MMOL/L (ref 99–110)
CO2 SERPL-SCNC: 20 MMOL/L (ref 21–32)
CREAT SERPL-MCNC: 6.3 MG/DL (ref 0.9–1.3)
DEPRECATED RDW RBC AUTO: 17.1 % (ref 12.4–15.4)
GFR SERPLBLD CREATININE-BSD FMLA CKD-EPI: 10 ML/MIN/{1.73_M2}
GLUCOSE BLD-MCNC: 199 MG/DL (ref 70–99)
GLUCOSE BLD-MCNC: 233 MG/DL (ref 70–99)
GLUCOSE BLD-MCNC: 309 MG/DL (ref 70–99)
GLUCOSE SERPL-MCNC: 257 MG/DL (ref 70–99)
HCT VFR BLD AUTO: 31.7 % (ref 40.5–52.5)
HGB BLD-MCNC: 10.5 G/DL (ref 13.5–17.5)
MCH RBC QN AUTO: 29.1 PG (ref 26–34)
MCHC RBC AUTO-ENTMCNC: 33.1 G/DL (ref 31–36)
MCV RBC AUTO: 87.7 FL (ref 80–100)
PERFORMED ON: ABNORMAL
PHOSPHATE SERPL-MCNC: 5.5 MG/DL (ref 2.5–4.9)
PLATELET # BLD AUTO: 224 K/UL (ref 135–450)
PMV BLD AUTO: 7.3 FL (ref 5–10.5)
POTASSIUM SERPL-SCNC: 5.3 MMOL/L (ref 3.5–5.1)
RBC # BLD AUTO: 3.62 M/UL (ref 4.2–5.9)
SODIUM SERPL-SCNC: 118 MMOL/L (ref 136–145)
WBC # BLD AUTO: 8.7 K/UL (ref 4–11)

## 2023-05-06 PROCEDURE — 2500000003 HC RX 250 WO HCPCS: Performed by: INTERNAL MEDICINE

## 2023-05-06 PROCEDURE — 6370000000 HC RX 637 (ALT 250 FOR IP): Performed by: INTERNAL MEDICINE

## 2023-05-06 PROCEDURE — 90935 HEMODIALYSIS ONE EVALUATION: CPT

## 2023-05-06 PROCEDURE — 2700000000 HC OXYGEN THERAPY PER DAY

## 2023-05-06 PROCEDURE — 80069 RENAL FUNCTION PANEL: CPT

## 2023-05-06 PROCEDURE — 94660 CPAP INITIATION&MGMT: CPT

## 2023-05-06 PROCEDURE — 94761 N-INVAS EAR/PLS OXIMETRY MLT: CPT

## 2023-05-06 PROCEDURE — 85027 COMPLETE CBC AUTOMATED: CPT

## 2023-05-06 PROCEDURE — 1200000000 HC SEMI PRIVATE

## 2023-05-06 PROCEDURE — 2580000003 HC RX 258: Performed by: INTERNAL MEDICINE

## 2023-05-06 RX ORDER — INSULIN LISPRO 100 [IU]/ML
0-4 INJECTION, SOLUTION INTRAVENOUS; SUBCUTANEOUS
Status: DISCONTINUED | OUTPATIENT
Start: 2023-05-06 | End: 2023-05-11 | Stop reason: HOSPADM

## 2023-05-06 RX ORDER — INSULIN LISPRO 100 [IU]/ML
0-4 INJECTION, SOLUTION INTRAVENOUS; SUBCUTANEOUS NIGHTLY
Status: DISCONTINUED | OUTPATIENT
Start: 2023-05-06 | End: 2023-05-11 | Stop reason: HOSPADM

## 2023-05-06 RX ORDER — DEXTROSE MONOHYDRATE 100 MG/ML
INJECTION, SOLUTION INTRAVENOUS CONTINUOUS PRN
Status: DISCONTINUED | OUTPATIENT
Start: 2023-05-06 | End: 2023-05-11 | Stop reason: HOSPADM

## 2023-05-06 RX ADMIN — GABAPENTIN 100 MG: 100 CAPSULE ORAL at 08:23

## 2023-05-06 RX ADMIN — GABAPENTIN 100 MG: 100 CAPSULE ORAL at 16:25

## 2023-05-06 RX ADMIN — INSULIN LISPRO 4 UNITS: 100 INJECTION, SOLUTION INTRAVENOUS; SUBCUTANEOUS at 21:28

## 2023-05-06 RX ADMIN — SODIUM CHLORIDE, PRESERVATIVE FREE 10 ML: 5 INJECTION INTRAVENOUS at 08:24

## 2023-05-06 RX ADMIN — ISOSORBIDE DINITRATE 20 MG: 20 TABLET ORAL at 21:26

## 2023-05-06 RX ADMIN — GABAPENTIN 100 MG: 100 CAPSULE ORAL at 21:25

## 2023-05-06 RX ADMIN — CALCIUM ACETATE 667 MG: 667 CAPSULE ORAL at 18:00

## 2023-05-06 RX ADMIN — QUETIAPINE FUMARATE 50 MG: 25 TABLET ORAL at 21:26

## 2023-05-06 RX ADMIN — PRAVASTATIN SODIUM 40 MG: 40 TABLET ORAL at 13:37

## 2023-05-06 RX ADMIN — Medication: at 21:28

## 2023-05-06 RX ADMIN — CLOPIDOGREL BISULFATE 75 MG: 75 TABLET ORAL at 13:38

## 2023-05-06 RX ADMIN — CALCIUM ACETATE 667 MG: 667 CAPSULE ORAL at 13:37

## 2023-05-06 RX ADMIN — SACUBITRIL AND VALSARTAN 1 TABLET: 24; 26 TABLET, FILM COATED ORAL at 21:26

## 2023-05-06 RX ADMIN — SACUBITRIL AND VALSARTAN 1 TABLET: 24; 26 TABLET, FILM COATED ORAL at 13:37

## 2023-05-06 RX ADMIN — CYCLOBENZAPRINE 10 MG: 10 TABLET, FILM COATED ORAL at 16:25

## 2023-05-06 RX ADMIN — CYCLOBENZAPRINE 10 MG: 10 TABLET, FILM COATED ORAL at 08:23

## 2023-05-06 RX ADMIN — CYCLOBENZAPRINE 10 MG: 10 TABLET, FILM COATED ORAL at 21:25

## 2023-05-06 RX ADMIN — PANTOPRAZOLE SODIUM 40 MG: 40 TABLET, DELAYED RELEASE ORAL at 05:50

## 2023-05-06 RX ADMIN — ISOSORBIDE DINITRATE 20 MG: 20 TABLET ORAL at 13:37

## 2023-05-06 RX ADMIN — SODIUM CHLORIDE, PRESERVATIVE FREE 10 ML: 5 INJECTION INTRAVENOUS at 21:26

## 2023-05-06 RX ADMIN — APIXABAN 2.5 MG: 2.5 TABLET, FILM COATED ORAL at 13:38

## 2023-05-06 RX ADMIN — TORSEMIDE 100 MG: 100 TABLET ORAL at 13:37

## 2023-05-06 RX ADMIN — DULOXETINE HYDROCHLORIDE 60 MG: 60 CAPSULE, DELAYED RELEASE ORAL at 13:38

## 2023-05-06 RX ADMIN — APIXABAN 2.5 MG: 2.5 TABLET, FILM COATED ORAL at 21:26

## 2023-05-06 RX ADMIN — ACETAMINOPHEN 650 MG: 325 TABLET ORAL at 21:25

## 2023-05-06 RX ADMIN — METOPROLOL SUCCINATE 100 MG: 50 TABLET, EXTENDED RELEASE ORAL at 21:26

## 2023-05-06 ASSESSMENT — PAIN SCALES - GENERAL
PAINLEVEL_OUTOF10: 6
PAINLEVEL_OUTOF10: 0
PAINLEVEL_OUTOF10: 8
PAINLEVEL_OUTOF10: 8
PAINLEVEL_OUTOF10: 0
PAINLEVEL_OUTOF10: 8
PAINLEVEL_OUTOF10: 8
PAINLEVEL_OUTOF10: 6

## 2023-05-06 ASSESSMENT — PAIN DESCRIPTION - LOCATION
LOCATION: BACK;FOOT
LOCATION: BACK

## 2023-05-06 ASSESSMENT — PAIN DESCRIPTION - DESCRIPTORS: DESCRIPTORS: ACHING

## 2023-05-07 ENCOUNTER — APPOINTMENT (OUTPATIENT)
Dept: CT IMAGING | Age: 53
DRG: 246 | End: 2023-05-07
Payer: MEDICARE

## 2023-05-07 LAB
ANION GAP SERPL CALCULATED.3IONS-SCNC: 17 MMOL/L (ref 3–16)
ANION GAP SERPL CALCULATED.3IONS-SCNC: 17 MMOL/L (ref 3–16)
BUN SERPL-MCNC: 76 MG/DL (ref 7–20)
BUN SERPL-MCNC: 77 MG/DL (ref 7–20)
CALCIUM SERPL-MCNC: 8.2 MG/DL (ref 8.3–10.6)
CALCIUM SERPL-MCNC: 8.3 MG/DL (ref 8.3–10.6)
CHLORIDE SERPL-SCNC: 83 MMOL/L (ref 99–110)
CHLORIDE SERPL-SCNC: 83 MMOL/L (ref 99–110)
CO2 SERPL-SCNC: 18 MMOL/L (ref 21–32)
CO2 SERPL-SCNC: 20 MMOL/L (ref 21–32)
CREAT SERPL-MCNC: 8.2 MG/DL (ref 0.9–1.3)
CREAT SERPL-MCNC: 8.6 MG/DL (ref 0.9–1.3)
EKG ATRIAL RATE: 57 BPM
EKG DIAGNOSIS: NORMAL
EKG P AXIS: 74 DEGREES
EKG P-R INTERVAL: 230 MS
EKG Q-T INTERVAL: 454 MS
EKG QRS DURATION: 110 MS
EKG QTC CALCULATION (BAZETT): 441 MS
EKG R AXIS: 44 DEGREES
EKG T AXIS: 199 DEGREES
EKG VENTRICULAR RATE: 57 BPM
GFR SERPLBLD CREATININE-BSD FMLA CKD-EPI: 7 ML/MIN/{1.73_M2}
GFR SERPLBLD CREATININE-BSD FMLA CKD-EPI: 7 ML/MIN/{1.73_M2}
GLUCOSE BLD-MCNC: 132 MG/DL (ref 70–99)
GLUCOSE BLD-MCNC: 181 MG/DL (ref 70–99)
GLUCOSE BLD-MCNC: 192 MG/DL (ref 70–99)
GLUCOSE BLD-MCNC: 204 MG/DL (ref 70–99)
GLUCOSE BLD-MCNC: 206 MG/DL (ref 70–99)
GLUCOSE BLD-MCNC: 209 MG/DL (ref 70–99)
GLUCOSE BLD-MCNC: 234 MG/DL (ref 70–99)
GLUCOSE SERPL-MCNC: 109 MG/DL (ref 70–99)
GLUCOSE SERPL-MCNC: 192 MG/DL (ref 70–99)
PERFORMED ON: ABNORMAL
POTASSIUM SERPL-SCNC: 6.3 MMOL/L (ref 3.5–5.1)
POTASSIUM SERPL-SCNC: 6.4 MMOL/L (ref 3.5–5.1)
SODIUM SERPL-SCNC: 118 MMOL/L (ref 136–145)
SODIUM SERPL-SCNC: 120 MMOL/L (ref 136–145)
TROPONIN, HIGH SENSITIVITY: 83 NG/L (ref 0–22)
TROPONIN, HIGH SENSITIVITY: 87 NG/L (ref 0–22)

## 2023-05-07 PROCEDURE — 97166 OT EVAL MOD COMPLEX 45 MIN: CPT

## 2023-05-07 PROCEDURE — 97535 SELF CARE MNGMENT TRAINING: CPT

## 2023-05-07 PROCEDURE — 6370000000 HC RX 637 (ALT 250 FOR IP): Performed by: INTERNAL MEDICINE

## 2023-05-07 PROCEDURE — 93005 ELECTROCARDIOGRAM TRACING: CPT | Performed by: INTERNAL MEDICINE

## 2023-05-07 PROCEDURE — 94660 CPAP INITIATION&MGMT: CPT

## 2023-05-07 PROCEDURE — 97530 THERAPEUTIC ACTIVITIES: CPT

## 2023-05-07 PROCEDURE — 6370000000 HC RX 637 (ALT 250 FOR IP): Performed by: NURSE PRACTITIONER

## 2023-05-07 PROCEDURE — 72125 CT NECK SPINE W/O DYE: CPT

## 2023-05-07 PROCEDURE — 1200000000 HC SEMI PRIVATE

## 2023-05-07 PROCEDURE — 97162 PT EVAL MOD COMPLEX 30 MIN: CPT

## 2023-05-07 PROCEDURE — 2500000003 HC RX 250 WO HCPCS: Performed by: INTERNAL MEDICINE

## 2023-05-07 PROCEDURE — 94761 N-INVAS EAR/PLS OXIMETRY MLT: CPT

## 2023-05-07 PROCEDURE — 84484 ASSAY OF TROPONIN QUANT: CPT

## 2023-05-07 PROCEDURE — 99222 1ST HOSP IP/OBS MODERATE 55: CPT | Performed by: INTERNAL MEDICINE

## 2023-05-07 PROCEDURE — 2700000000 HC OXYGEN THERAPY PER DAY

## 2023-05-07 PROCEDURE — 70450 CT HEAD/BRAIN W/O DYE: CPT

## 2023-05-07 PROCEDURE — 97116 GAIT TRAINING THERAPY: CPT

## 2023-05-07 PROCEDURE — 36415 COLL VENOUS BLD VENIPUNCTURE: CPT

## 2023-05-07 PROCEDURE — 2580000003 HC RX 258: Performed by: INTERNAL MEDICINE

## 2023-05-07 PROCEDURE — 93010 ELECTROCARDIOGRAM REPORT: CPT | Performed by: INTERNAL MEDICINE

## 2023-05-07 PROCEDURE — 80048 BASIC METABOLIC PNL TOTAL CA: CPT

## 2023-05-07 RX ORDER — OXYCODONE AND ACETAMINOPHEN 7.5; 325 MG/1; MG/1
1 TABLET ORAL EVERY 4 HOURS PRN
Status: COMPLETED | OUTPATIENT
Start: 2023-05-07 | End: 2023-05-08

## 2023-05-07 RX ORDER — DEXTROSE MONOHYDRATE 25 G/50ML
25 INJECTION, SOLUTION INTRAVENOUS ONCE
Status: COMPLETED | OUTPATIENT
Start: 2023-05-07 | End: 2023-05-07

## 2023-05-07 RX ADMIN — SODIUM ZIRCONIUM CYCLOSILICATE 10 G: 10 POWDER, FOR SUSPENSION ORAL at 13:41

## 2023-05-07 RX ADMIN — SACUBITRIL AND VALSARTAN 1 TABLET: 24; 26 TABLET, FILM COATED ORAL at 08:42

## 2023-05-07 RX ADMIN — PANTOPRAZOLE SODIUM 40 MG: 40 TABLET, DELAYED RELEASE ORAL at 08:42

## 2023-05-07 RX ADMIN — METOPROLOL SUCCINATE 100 MG: 50 TABLET, EXTENDED RELEASE ORAL at 08:42

## 2023-05-07 RX ADMIN — APIXABAN 2.5 MG: 2.5 TABLET, FILM COATED ORAL at 20:35

## 2023-05-07 RX ADMIN — LOPERAMIDE HYDROCHLORIDE 2 MG: 2 CAPSULE ORAL at 23:25

## 2023-05-07 RX ADMIN — ISOSORBIDE DINITRATE 20 MG: 20 TABLET ORAL at 12:49

## 2023-05-07 RX ADMIN — TORSEMIDE 100 MG: 100 TABLET ORAL at 08:42

## 2023-05-07 RX ADMIN — GABAPENTIN 100 MG: 100 CAPSULE ORAL at 17:04

## 2023-05-07 RX ADMIN — QUETIAPINE FUMARATE 50 MG: 25 TABLET ORAL at 20:35

## 2023-05-07 RX ADMIN — PRAVASTATIN SODIUM 40 MG: 40 TABLET ORAL at 08:42

## 2023-05-07 RX ADMIN — OXYCODONE AND ACETAMINOPHEN 1 TABLET: 7.5; 325 TABLET ORAL at 12:49

## 2023-05-07 RX ADMIN — GABAPENTIN 100 MG: 100 CAPSULE ORAL at 10:25

## 2023-05-07 RX ADMIN — CALCIUM ACETATE 667 MG: 667 CAPSULE ORAL at 12:49

## 2023-05-07 RX ADMIN — CYCLOBENZAPRINE 10 MG: 10 TABLET, FILM COATED ORAL at 23:25

## 2023-05-07 RX ADMIN — DULOXETINE HYDROCHLORIDE 60 MG: 60 CAPSULE, DELAYED RELEASE ORAL at 08:42

## 2023-05-07 RX ADMIN — Medication: at 20:37

## 2023-05-07 RX ADMIN — CLOPIDOGREL BISULFATE 75 MG: 75 TABLET ORAL at 08:42

## 2023-05-07 RX ADMIN — CALCIUM ACETATE 667 MG: 667 CAPSULE ORAL at 08:41

## 2023-05-07 RX ADMIN — ISOSORBIDE DINITRATE 20 MG: 20 TABLET ORAL at 08:42

## 2023-05-07 RX ADMIN — CYCLOBENZAPRINE 10 MG: 10 TABLET, FILM COATED ORAL at 10:25

## 2023-05-07 RX ADMIN — APIXABAN 2.5 MG: 2.5 TABLET, FILM COATED ORAL at 08:42

## 2023-05-07 RX ADMIN — DEXTROSE MONOHYDRATE 25 G: 25 INJECTION, SOLUTION INTRAVENOUS at 08:42

## 2023-05-07 RX ADMIN — GABAPENTIN 100 MG: 100 CAPSULE ORAL at 23:25

## 2023-05-07 RX ADMIN — ISOSORBIDE DINITRATE 20 MG: 20 TABLET ORAL at 20:35

## 2023-05-07 RX ADMIN — OXYCODONE AND ACETAMINOPHEN 1 TABLET: 7.5; 325 TABLET ORAL at 08:41

## 2023-05-07 RX ADMIN — SODIUM CHLORIDE, PRESERVATIVE FREE 10 ML: 5 INJECTION INTRAVENOUS at 09:22

## 2023-05-07 RX ADMIN — OXYCODONE AND ACETAMINOPHEN 1 TABLET: 7.5; 325 TABLET ORAL at 17:04

## 2023-05-07 RX ADMIN — SODIUM CHLORIDE, PRESERVATIVE FREE 10 ML: 5 INJECTION INTRAVENOUS at 20:36

## 2023-05-07 RX ADMIN — SODIUM ZIRCONIUM CYCLOSILICATE 10 G: 10 POWDER, FOR SUSPENSION ORAL at 10:20

## 2023-05-07 RX ADMIN — SODIUM ZIRCONIUM CYCLOSILICATE 10 G: 10 POWDER, FOR SUSPENSION ORAL at 20:37

## 2023-05-07 RX ADMIN — CALCIUM ACETATE 667 MG: 667 CAPSULE ORAL at 17:04

## 2023-05-07 RX ADMIN — CYCLOBENZAPRINE 10 MG: 10 TABLET, FILM COATED ORAL at 17:04

## 2023-05-07 RX ADMIN — INSULIN HUMAN 10 UNITS: 100 INJECTION, SOLUTION PARENTERAL at 08:42

## 2023-05-07 RX ADMIN — OXYCODONE AND ACETAMINOPHEN 1 TABLET: 7.5; 325 TABLET ORAL at 23:25

## 2023-05-07 ASSESSMENT — PAIN DESCRIPTION - LOCATION
LOCATION: BACK

## 2023-05-07 ASSESSMENT — PAIN SCALES - GENERAL
PAINLEVEL_OUTOF10: 8
PAINLEVEL_OUTOF10: 10
PAINLEVEL_OUTOF10: 8

## 2023-05-08 ENCOUNTER — APPOINTMENT (OUTPATIENT)
Dept: VASCULAR LAB | Age: 53
DRG: 246 | End: 2023-05-08
Payer: MEDICARE

## 2023-05-08 ENCOUNTER — APPOINTMENT (OUTPATIENT)
Dept: NUCLEAR MEDICINE | Age: 53
DRG: 246 | End: 2023-05-08
Payer: MEDICARE

## 2023-05-08 PROBLEM — R07.89 CHEST HEAVINESS: Status: ACTIVE | Noted: 2023-05-08

## 2023-05-08 LAB
ANION GAP SERPL CALCULATED.3IONS-SCNC: 17 MMOL/L (ref 3–16)
BUN SERPL-MCNC: 93 MG/DL (ref 7–20)
CALCIUM SERPL-MCNC: 8.4 MG/DL (ref 8.3–10.6)
CHLORIDE SERPL-SCNC: 83 MMOL/L (ref 99–110)
CO2 SERPL-SCNC: 23 MMOL/L (ref 21–32)
CREAT SERPL-MCNC: 8.9 MG/DL (ref 0.9–1.3)
GFR SERPLBLD CREATININE-BSD FMLA CKD-EPI: 6 ML/MIN/{1.73_M2}
GLUCOSE BLD-MCNC: 188 MG/DL (ref 70–99)
GLUCOSE BLD-MCNC: 236 MG/DL (ref 70–99)
GLUCOSE BLD-MCNC: 272 MG/DL (ref 70–99)
GLUCOSE BLD-MCNC: 306 MG/DL (ref 70–99)
GLUCOSE SERPL-MCNC: 143 MG/DL (ref 70–99)
PERFORMED ON: ABNORMAL
POTASSIUM SERPL-SCNC: 5.5 MMOL/L (ref 3.5–5.1)
SODIUM SERPL-SCNC: 123 MMOL/L (ref 136–145)

## 2023-05-08 PROCEDURE — 94660 CPAP INITIATION&MGMT: CPT

## 2023-05-08 PROCEDURE — 93923 UPR/LXTR ART STDY 3+ LVLS: CPT

## 2023-05-08 PROCEDURE — 80048 BASIC METABOLIC PNL TOTAL CA: CPT

## 2023-05-08 PROCEDURE — 6370000000 HC RX 637 (ALT 250 FOR IP): Performed by: INTERNAL MEDICINE

## 2023-05-08 PROCEDURE — 2500000003 HC RX 250 WO HCPCS: Performed by: INTERNAL MEDICINE

## 2023-05-08 PROCEDURE — 1200000000 HC SEMI PRIVATE

## 2023-05-08 PROCEDURE — 6370000000 HC RX 637 (ALT 250 FOR IP): Performed by: NURSE PRACTITIONER

## 2023-05-08 PROCEDURE — 2700000000 HC OXYGEN THERAPY PER DAY

## 2023-05-08 PROCEDURE — 78452 HT MUSCLE IMAGE SPECT MULT: CPT

## 2023-05-08 PROCEDURE — 94761 N-INVAS EAR/PLS OXIMETRY MLT: CPT

## 2023-05-08 PROCEDURE — 3430000000 HC RX DIAGNOSTIC RADIOPHARMACEUTICAL: Performed by: NURSE PRACTITIONER

## 2023-05-08 PROCEDURE — 90935 HEMODIALYSIS ONE EVALUATION: CPT

## 2023-05-08 PROCEDURE — A9502 TC99M TETROFOSMIN: HCPCS | Performed by: NURSE PRACTITIONER

## 2023-05-08 PROCEDURE — 6370000000 HC RX 637 (ALT 250 FOR IP)

## 2023-05-08 PROCEDURE — 2580000003 HC RX 258: Performed by: INTERNAL MEDICINE

## 2023-05-08 RX ORDER — MIDODRINE HYDROCHLORIDE 5 MG/1
10 TABLET ORAL PRN
Status: DISCONTINUED | OUTPATIENT
Start: 2023-05-08 | End: 2023-05-11 | Stop reason: HOSPADM

## 2023-05-08 RX ORDER — MIDODRINE HYDROCHLORIDE 5 MG/1
TABLET ORAL
Status: COMPLETED
Start: 2023-05-08 | End: 2023-05-08

## 2023-05-08 RX ADMIN — CALCIUM ACETATE 667 MG: 667 CAPSULE ORAL at 11:53

## 2023-05-08 RX ADMIN — MIDODRINE HYDROCHLORIDE 10 MG: 5 TABLET ORAL at 10:05

## 2023-05-08 RX ADMIN — ISOSORBIDE DINITRATE 20 MG: 20 TABLET ORAL at 15:27

## 2023-05-08 RX ADMIN — TORSEMIDE 100 MG: 100 TABLET ORAL at 11:11

## 2023-05-08 RX ADMIN — APIXABAN 2.5 MG: 2.5 TABLET, FILM COATED ORAL at 19:59

## 2023-05-08 RX ADMIN — TETROFOSMIN 27 MILLICURIE: 1.38 INJECTION, POWDER, LYOPHILIZED, FOR SOLUTION INTRAVENOUS at 16:19

## 2023-05-08 RX ADMIN — APIXABAN 2.5 MG: 2.5 TABLET, FILM COATED ORAL at 11:11

## 2023-05-08 RX ADMIN — METOPROLOL SUCCINATE 100 MG: 50 TABLET, EXTENDED RELEASE ORAL at 19:59

## 2023-05-08 RX ADMIN — CALCIUM ACETATE 667 MG: 667 CAPSULE ORAL at 18:10

## 2023-05-08 RX ADMIN — CYCLOBENZAPRINE 10 MG: 10 TABLET, FILM COATED ORAL at 15:59

## 2023-05-08 RX ADMIN — QUETIAPINE FUMARATE 50 MG: 25 TABLET ORAL at 19:59

## 2023-05-08 RX ADMIN — Medication: at 20:00

## 2023-05-08 RX ADMIN — SODIUM CHLORIDE, PRESERVATIVE FREE 10 ML: 5 INJECTION INTRAVENOUS at 20:01

## 2023-05-08 RX ADMIN — OXYCODONE AND ACETAMINOPHEN 1 TABLET: 7.5; 325 TABLET ORAL at 08:17

## 2023-05-08 RX ADMIN — CLOPIDOGREL BISULFATE 75 MG: 75 TABLET ORAL at 11:11

## 2023-05-08 RX ADMIN — PRAVASTATIN SODIUM 40 MG: 40 TABLET ORAL at 11:11

## 2023-05-08 RX ADMIN — CYCLOBENZAPRINE 10 MG: 10 TABLET, FILM COATED ORAL at 08:17

## 2023-05-08 RX ADMIN — GABAPENTIN 100 MG: 100 CAPSULE ORAL at 15:59

## 2023-05-08 RX ADMIN — INSULIN LISPRO 3 UNITS: 100 INJECTION, SOLUTION INTRAVENOUS; SUBCUTANEOUS at 18:09

## 2023-05-08 RX ADMIN — DULOXETINE HYDROCHLORIDE 60 MG: 60 CAPSULE, DELAYED RELEASE ORAL at 11:11

## 2023-05-08 RX ADMIN — OXYCODONE AND ACETAMINOPHEN 1 TABLET: 7.5; 325 TABLET ORAL at 15:59

## 2023-05-08 RX ADMIN — GABAPENTIN 100 MG: 100 CAPSULE ORAL at 08:17

## 2023-05-08 RX ADMIN — ISOSORBIDE DINITRATE 20 MG: 20 TABLET ORAL at 19:59

## 2023-05-08 ASSESSMENT — PAIN DESCRIPTION - LOCATION
LOCATION: BACK
LOCATION: LEG;BACK

## 2023-05-08 ASSESSMENT — PAIN SCALES - GENERAL
PAINLEVEL_OUTOF10: 9
PAINLEVEL_OUTOF10: 9
PAINLEVEL_OUTOF10: 5
PAINLEVEL_OUTOF10: 9

## 2023-05-08 ASSESSMENT — PAIN DESCRIPTION - ORIENTATION
ORIENTATION: LOWER;LEFT;RIGHT
ORIENTATION: LOWER

## 2023-05-08 ASSESSMENT — PAIN DESCRIPTION - DESCRIPTORS: DESCRIPTORS: ACHING

## 2023-05-08 ASSESSMENT — PAIN - FUNCTIONAL ASSESSMENT: PAIN_FUNCTIONAL_ASSESSMENT: ACTIVITIES ARE NOT PREVENTED

## 2023-05-08 NOTE — DIALYSIS
Treatment time: 3.25 Hours of 3.5 hour tx  Net UF: 2600 ML    Pre weight: 108.5 Kg  Post weight: 104.9 kg  EDW: 99.5 kg, may need challenged per Dr. Domenico Izaguirre    Access used: LCW TDC   Access function: Good with  ml/min    Medications or blood products given: Midodrine 10mg at start of tx    Regular outpatient schedule: MWF at [facility] of response to treatment: Tolerted tx well, occasional lightheadedness resolved with adjusting 02 NC. Bradycardic t/o with soft BPs in the 100s. Goal adjusted due to SBP in 80s towards end of tx, responded quickly to fluid. Copy of dialysis treatment record placed in chart, to be scanned into EMR. Report to floor NAVJOT Petersen RN.

## 2023-05-08 NOTE — PLAN OF CARE
Problem: Discharge Planning  Goal: Discharge to home or other facility with appropriate resources  Outcome: Progressing     Problem: Pain  Goal: Verbalizes/displays adequate comfort level or baseline comfort level  Outcome: Progressing     Problem: Chronic Conditions and Co-morbidities  Goal: Patient's chronic conditions and co-morbidity symptoms are monitored and maintained or improved  Outcome: Progressing     Problem: Safety - Adult  Goal: Free from fall injury  Outcome: Progressing     Problem: Cardiovascular - Adult  Goal: Maintains optimal cardiac output and hemodynamic stability  Outcome: Progressing  Goal: Absence of cardiac dysrhythmias or at baseline  Outcome: Progressing     Problem: Metabolic/Fluid and Electrolytes - Adult  Goal: Electrolytes maintained within normal limits  Outcome: Progressing  Goal: Hemodynamic stability and optimal renal function maintained  Outcome: Progressing  Goal: Glucose maintained within prescribed range  Outcome: Progressing     Problem: Nutrition Deficit:  Goal: Optimize nutritional status  Outcome: Progressing

## 2023-05-08 NOTE — CARE COORDINATION
CM update note. PD #7 Followed by Hospital Medicine, Vascular Surgery, Cardiology and Nephrology. Scheduled for Stress test 5/9. Delayed d/t scheduled HD 5/8. HD today and then for Laser Skin Perfusion Study. Per Dr Real Fisher note, \"Perfusion is actually good at all level except right medial plantar but there is no wound there. Suggest conservative treatment, that is no invasive angiogram or arterial intervention. Patient can follow up with me as outpatient if continued healing problems after appropriate wound care. Will continue scheduled HD Feng Armando MW and resume [facility] at discharge.     Will continue to follow    Jayesh Hamilton RN

## 2023-05-09 ENCOUNTER — APPOINTMENT (OUTPATIENT)
Dept: NUCLEAR MEDICINE | Age: 53
DRG: 246 | End: 2023-05-09
Payer: MEDICARE

## 2023-05-09 LAB
ANION GAP SERPL CALCULATED.3IONS-SCNC: 15 MMOL/L (ref 3–16)
BUN SERPL-MCNC: 61 MG/DL (ref 7–20)
CALCIUM SERPL-MCNC: 8.2 MG/DL (ref 8.3–10.6)
CHLORIDE SERPL-SCNC: 87 MMOL/L (ref 99–110)
CO2 SERPL-SCNC: 20 MMOL/L (ref 21–32)
CREAT SERPL-MCNC: 6.9 MG/DL (ref 0.9–1.3)
GFR SERPLBLD CREATININE-BSD FMLA CKD-EPI: 9 ML/MIN/{1.73_M2}
GLUCOSE BLD-MCNC: 139 MG/DL (ref 70–99)
GLUCOSE BLD-MCNC: 232 MG/DL (ref 70–99)
GLUCOSE BLD-MCNC: 253 MG/DL (ref 70–99)
GLUCOSE BLD-MCNC: 272 MG/DL (ref 70–99)
GLUCOSE BLD-MCNC: 285 MG/DL (ref 70–99)
GLUCOSE SERPL-MCNC: 251 MG/DL (ref 70–99)
LV EF: 34 %
LVEF MODALITY: NORMAL
PERFORMED ON: ABNORMAL
POTASSIUM SERPL-SCNC: 5.5 MMOL/L (ref 3.5–5.1)
SODIUM SERPL-SCNC: 122 MMOL/L (ref 136–145)

## 2023-05-09 PROCEDURE — 94660 CPAP INITIATION&MGMT: CPT

## 2023-05-09 PROCEDURE — 6360000002 HC RX W HCPCS: Performed by: NURSE PRACTITIONER

## 2023-05-09 PROCEDURE — 3430000000 HC RX DIAGNOSTIC RADIOPHARMACEUTICAL: Performed by: NURSE PRACTITIONER

## 2023-05-09 PROCEDURE — 6370000000 HC RX 637 (ALT 250 FOR IP): Performed by: INTERNAL MEDICINE

## 2023-05-09 PROCEDURE — A9502 TC99M TETROFOSMIN: HCPCS | Performed by: NURSE PRACTITIONER

## 2023-05-09 PROCEDURE — 93017 CV STRESS TEST TRACING ONLY: CPT

## 2023-05-09 PROCEDURE — 94761 N-INVAS EAR/PLS OXIMETRY MLT: CPT

## 2023-05-09 PROCEDURE — 1200000000 HC SEMI PRIVATE

## 2023-05-09 PROCEDURE — 2580000003 HC RX 258: Performed by: INTERNAL MEDICINE

## 2023-05-09 PROCEDURE — 80048 BASIC METABOLIC PNL TOTAL CA: CPT

## 2023-05-09 PROCEDURE — 2500000003 HC RX 250 WO HCPCS: Performed by: INTERNAL MEDICINE

## 2023-05-09 PROCEDURE — 6360000002 HC RX W HCPCS: Performed by: INTERNAL MEDICINE

## 2023-05-09 PROCEDURE — 90935 HEMODIALYSIS ONE EVALUATION: CPT

## 2023-05-09 PROCEDURE — 6360000002 HC RX W HCPCS

## 2023-05-09 PROCEDURE — 36415 COLL VENOUS BLD VENIPUNCTURE: CPT

## 2023-05-09 PROCEDURE — 2700000000 HC OXYGEN THERAPY PER DAY

## 2023-05-09 RX ORDER — HEPARIN SODIUM 1000 [USP'U]/ML
3600 INJECTION, SOLUTION INTRAVENOUS; SUBCUTANEOUS PRN
Status: DISCONTINUED | OUTPATIENT
Start: 2023-05-09 | End: 2023-05-11 | Stop reason: HOSPADM

## 2023-05-09 RX ORDER — HEPARIN SODIUM 1000 [USP'U]/ML
INJECTION, SOLUTION INTRAVENOUS; SUBCUTANEOUS
Status: COMPLETED
Start: 2023-05-09 | End: 2023-05-09

## 2023-05-09 RX ORDER — AMINOPHYLLINE DIHYDRATE 25 MG/ML
100 INJECTION, SOLUTION INTRAVENOUS ONCE
Status: COMPLETED | OUTPATIENT
Start: 2023-05-09 | End: 2023-05-09

## 2023-05-09 RX ADMIN — HEPARIN SODIUM 3600 UNITS: 1000 INJECTION INTRAVENOUS; SUBCUTANEOUS at 17:02

## 2023-05-09 RX ADMIN — Medication: at 21:09

## 2023-05-09 RX ADMIN — PANTOPRAZOLE SODIUM 40 MG: 40 TABLET, DELAYED RELEASE ORAL at 05:33

## 2023-05-09 RX ADMIN — CALCIUM ACETATE 667 MG: 667 CAPSULE ORAL at 17:33

## 2023-05-09 RX ADMIN — METOPROLOL SUCCINATE 100 MG: 50 TABLET, EXTENDED RELEASE ORAL at 21:02

## 2023-05-09 RX ADMIN — CLOPIDOGREL BISULFATE 75 MG: 75 TABLET ORAL at 09:32

## 2023-05-09 RX ADMIN — GABAPENTIN 100 MG: 100 CAPSULE ORAL at 17:33

## 2023-05-09 RX ADMIN — QUETIAPINE FUMARATE 50 MG: 25 TABLET ORAL at 21:02

## 2023-05-09 RX ADMIN — APIXABAN 2.5 MG: 2.5 TABLET, FILM COATED ORAL at 21:02

## 2023-05-09 RX ADMIN — METOPROLOL SUCCINATE 100 MG: 50 TABLET, EXTENDED RELEASE ORAL at 09:32

## 2023-05-09 RX ADMIN — CYCLOBENZAPRINE 10 MG: 10 TABLET, FILM COATED ORAL at 09:32

## 2023-05-09 RX ADMIN — PRAVASTATIN SODIUM 40 MG: 40 TABLET ORAL at 09:32

## 2023-05-09 RX ADMIN — SODIUM CHLORIDE, PRESERVATIVE FREE 10 ML: 5 INJECTION INTRAVENOUS at 09:38

## 2023-05-09 RX ADMIN — APIXABAN 2.5 MG: 2.5 TABLET, FILM COATED ORAL at 09:32

## 2023-05-09 RX ADMIN — Medication: at 09:23

## 2023-05-09 RX ADMIN — DULOXETINE HYDROCHLORIDE 60 MG: 60 CAPSULE, DELAYED RELEASE ORAL at 09:32

## 2023-05-09 RX ADMIN — CALCIUM ACETATE 667 MG: 667 CAPSULE ORAL at 09:32

## 2023-05-09 RX ADMIN — ISOSORBIDE DINITRATE 20 MG: 20 TABLET ORAL at 09:32

## 2023-05-09 RX ADMIN — AMINOPHYLLINE 100 MG: 25 INJECTION, SOLUTION INTRAVENOUS at 10:49

## 2023-05-09 RX ADMIN — ISOSORBIDE DINITRATE 20 MG: 20 TABLET ORAL at 21:02

## 2023-05-09 RX ADMIN — REGADENOSON 0.4 MG: 0.08 INJECTION, SOLUTION INTRAVENOUS at 10:59

## 2023-05-09 RX ADMIN — TORSEMIDE 100 MG: 100 TABLET ORAL at 09:32

## 2023-05-09 RX ADMIN — TETROFOSMIN 33.6 MILLICURIE: 1.38 INJECTION, POWDER, LYOPHILIZED, FOR SOLUTION INTRAVENOUS at 10:59

## 2023-05-09 RX ADMIN — SODIUM CHLORIDE, PRESERVATIVE FREE 10 ML: 5 INJECTION INTRAVENOUS at 21:09

## 2023-05-09 RX ADMIN — INSULIN LISPRO 1 UNITS: 100 INJECTION, SOLUTION INTRAVENOUS; SUBCUTANEOUS at 13:31

## 2023-05-09 RX ADMIN — HEPARIN SODIUM 3600 UNITS: 1000 INJECTION, SOLUTION INTRAVENOUS; SUBCUTANEOUS at 17:02

## 2023-05-09 RX ADMIN — ISOSORBIDE DINITRATE 20 MG: 20 TABLET ORAL at 17:33

## 2023-05-09 RX ADMIN — CYCLOBENZAPRINE 10 MG: 10 TABLET, FILM COATED ORAL at 17:33

## 2023-05-09 RX ADMIN — GABAPENTIN 100 MG: 100 CAPSULE ORAL at 09:32

## 2023-05-09 ASSESSMENT — PAIN DESCRIPTION - LOCATION: LOCATION: LEG;BACK

## 2023-05-09 ASSESSMENT — PAIN DESCRIPTION - ORIENTATION: ORIENTATION: LEFT;RIGHT

## 2023-05-09 ASSESSMENT — PAIN DESCRIPTION - DESCRIPTORS: DESCRIPTORS: ACHING

## 2023-05-09 ASSESSMENT — PAIN SCALES - GENERAL: PAINLEVEL_OUTOF10: 8

## 2023-05-09 NOTE — PLAN OF CARE
Problem: Pain  Goal: Verbalizes/displays adequate comfort level or baseline comfort level  Outcome: Progressing     Problem: Safety - Adult  Goal: Free from fall injury  Outcome: Progressing     Problem: Cardiovascular - Adult  Goal: Absence of cardiac dysrhythmias or at baseline  Outcome: Progressing     Problem: Nutrition Deficit:  Goal: Optimize nutritional status  Outcome: Progressing

## 2023-05-09 NOTE — CARE COORDINATION
CM following. From home with wife, [facility]. HD MWF Dannielle Lewis. 2nd part of stress test today.

## 2023-05-09 NOTE — DIALYSIS
Treatment time: 3    Net UF: 3400ml    Pre weight: 106.1kg  Post weight: 102.9kg      Access used: Left chest CVC  Access function: No complications noted with CVC. Both lumens aspirated and flushed with ease. Medications or blood products given: None    Regular outpatient schedule: MWF    Summary of response to treatment: No complications noted. Patient and vitals remained stable the entire treatment. UF goal met without issue. Copy of dialysis treatment record placed in chart, to be scanned into EMR.

## 2023-05-10 ENCOUNTER — APPOINTMENT (OUTPATIENT)
Dept: CARDIAC CATH/INVASIVE PROCEDURES | Age: 53
DRG: 246 | End: 2023-05-10
Payer: MEDICARE

## 2023-05-10 PROBLEM — R94.39 ABNORMAL CARDIOVASCULAR STRESS TEST: Status: ACTIVE | Noted: 2023-05-10

## 2023-05-10 LAB
ALBUMIN SERPL-MCNC: 3.5 G/DL (ref 3.4–5)
ANION GAP SERPL CALCULATED.3IONS-SCNC: 13 MMOL/L (ref 3–16)
BUN SERPL-MCNC: 48 MG/DL (ref 7–20)
CALCIUM SERPL-MCNC: 8.6 MG/DL (ref 8.3–10.6)
CHLORIDE SERPL-SCNC: 85 MMOL/L (ref 99–110)
CO2 SERPL-SCNC: 23 MMOL/L (ref 21–32)
CREAT SERPL-MCNC: 5.7 MG/DL (ref 0.9–1.3)
EKG ATRIAL RATE: 66 BPM
EKG DIAGNOSIS: NORMAL
EKG P AXIS: 74 DEGREES
EKG P-R INTERVAL: 226 MS
EKG Q-T INTERVAL: 452 MS
EKG QRS DURATION: 110 MS
EKG QTC CALCULATION (BAZETT): 473 MS
EKG R AXIS: 102 DEGREES
EKG T AXIS: 69 DEGREES
EKG VENTRICULAR RATE: 66 BPM
GFR SERPLBLD CREATININE-BSD FMLA CKD-EPI: 11 ML/MIN/{1.73_M2}
GLUCOSE BLD-MCNC: 177 MG/DL (ref 70–99)
GLUCOSE BLD-MCNC: 223 MG/DL (ref 70–99)
GLUCOSE BLD-MCNC: 238 MG/DL (ref 70–99)
GLUCOSE BLD-MCNC: 244 MG/DL (ref 70–99)
GLUCOSE SERPL-MCNC: 182 MG/DL (ref 70–99)
LV EF: 33 %
LVEF MODALITY: NORMAL
PERFORMED ON: ABNORMAL
PHOSPHATE SERPL-MCNC: 5.4 MG/DL (ref 2.5–4.9)
POTASSIUM SERPL-SCNC: 4.8 MMOL/L (ref 3.5–5.1)
SODIUM SERPL-SCNC: 121 MMOL/L (ref 136–145)

## 2023-05-10 PROCEDURE — 6370000000 HC RX 637 (ALT 250 FOR IP): Performed by: INTERNAL MEDICINE

## 2023-05-10 PROCEDURE — 94660 CPAP INITIATION&MGMT: CPT

## 2023-05-10 PROCEDURE — 2700000000 HC OXYGEN THERAPY PER DAY

## 2023-05-10 PROCEDURE — 2500000003 HC RX 250 WO HCPCS

## 2023-05-10 PROCEDURE — 2580000003 HC RX 258: Performed by: INTERNAL MEDICINE

## 2023-05-10 PROCEDURE — B2161ZZ FLUOROSCOPY OF RIGHT AND LEFT HEART USING LOW OSMOLAR CONTRAST: ICD-10-PCS | Performed by: INTERNAL MEDICINE

## 2023-05-10 PROCEDURE — 92978 ENDOLUMINL IVUS OCT C 1ST: CPT

## 2023-05-10 PROCEDURE — 2709999900 HC NON-CHARGEABLE SUPPLY

## 2023-05-10 PROCEDURE — 80069 RENAL FUNCTION PANEL: CPT

## 2023-05-10 PROCEDURE — C1887 CATHETER, GUIDING: HCPCS

## 2023-05-10 PROCEDURE — 92979 ENDOLUMINL IVUS OCT C EA: CPT

## 2023-05-10 PROCEDURE — 92928 PRQ TCAT PLMT NTRAC ST 1 LES: CPT

## 2023-05-10 PROCEDURE — 6360000004 HC RX CONTRAST MEDICATION

## 2023-05-10 PROCEDURE — 6360000002 HC RX W HCPCS

## 2023-05-10 PROCEDURE — 6360000002 HC RX W HCPCS: Performed by: INTERNAL MEDICINE

## 2023-05-10 PROCEDURE — 2060000000 HC ICU INTERMEDIATE R&B

## 2023-05-10 PROCEDURE — C1874 STENT, COATED/COV W/DEL SYS: HCPCS

## 2023-05-10 PROCEDURE — 94761 N-INVAS EAR/PLS OXIMETRY MLT: CPT

## 2023-05-10 PROCEDURE — 027236Z DILATION OF CORONARY ARTERY, THREE ARTERIES WITH THREE DRUG-ELUTING INTRALUMINAL DEVICES, PERCUTANEOUS APPROACH: ICD-10-PCS | Performed by: INTERNAL MEDICINE

## 2023-05-10 PROCEDURE — 2580000003 HC RX 258

## 2023-05-10 PROCEDURE — C1776 JOINT DEVICE (IMPLANTABLE): HCPCS

## 2023-05-10 PROCEDURE — 36415 COLL VENOUS BLD VENIPUNCTURE: CPT

## 2023-05-10 PROCEDURE — 93010 ELECTROCARDIOGRAM REPORT: CPT | Performed by: INTERNAL MEDICINE

## 2023-05-10 PROCEDURE — C1753 CATH, INTRAVAS ULTRASOUND: HCPCS

## 2023-05-10 PROCEDURE — B241ZZ3 ULTRASONOGRAPHY OF MULTIPLE CORONARY ARTERIES, INTRAVASCULAR: ICD-10-PCS | Performed by: INTERNAL MEDICINE

## 2023-05-10 PROCEDURE — 93005 ELECTROCARDIOGRAM TRACING: CPT | Performed by: INTERNAL MEDICINE

## 2023-05-10 PROCEDURE — 4A023N7 MEASUREMENT OF CARDIAC SAMPLING AND PRESSURE, LEFT HEART, PERCUTANEOUS APPROACH: ICD-10-PCS | Performed by: INTERNAL MEDICINE

## 2023-05-10 PROCEDURE — 6370000000 HC RX 637 (ALT 250 FOR IP)

## 2023-05-10 PROCEDURE — C1769 GUIDE WIRE: HCPCS

## 2023-05-10 PROCEDURE — C1725 CATH, TRANSLUMIN NON-LASER: HCPCS

## 2023-05-10 PROCEDURE — 93458 L HRT ARTERY/VENTRICLE ANGIO: CPT

## 2023-05-10 PROCEDURE — 2500000003 HC RX 250 WO HCPCS: Performed by: INTERNAL MEDICINE

## 2023-05-10 PROCEDURE — C1894 INTRO/SHEATH, NON-LASER: HCPCS

## 2023-05-10 PROCEDURE — B2111ZZ FLUOROSCOPY OF MULTIPLE CORONARY ARTERIES USING LOW OSMOLAR CONTRAST: ICD-10-PCS | Performed by: INTERNAL MEDICINE

## 2023-05-10 RX ORDER — LORAZEPAM 0.5 MG/1
0.5 TABLET ORAL
Status: COMPLETED | OUTPATIENT
Start: 2023-05-10 | End: 2023-05-10

## 2023-05-10 RX ORDER — SODIUM CHLORIDE 0.9 % (FLUSH) 0.9 %
5-40 SYRINGE (ML) INJECTION PRN
Status: DISCONTINUED | OUTPATIENT
Start: 2023-05-10 | End: 2023-05-11 | Stop reason: HOSPADM

## 2023-05-10 RX ORDER — ACETAMINOPHEN 325 MG/1
650 TABLET ORAL EVERY 4 HOURS PRN
Status: DISCONTINUED | OUTPATIENT
Start: 2023-05-10 | End: 2023-05-11 | Stop reason: HOSPADM

## 2023-05-10 RX ORDER — FENTANYL CITRATE 50 UG/ML
INJECTION, SOLUTION INTRAMUSCULAR; INTRAVENOUS
Status: COMPLETED | OUTPATIENT
Start: 2023-05-10 | End: 2023-05-10

## 2023-05-10 RX ORDER — SODIUM CHLORIDE 9 MG/ML
INJECTION, SOLUTION INTRAVENOUS CONTINUOUS
Status: ACTIVE | OUTPATIENT
Start: 2023-05-10 | End: 2023-05-10

## 2023-05-10 RX ORDER — ASPIRIN 325 MG
325 TABLET ORAL ONCE
Status: COMPLETED | OUTPATIENT
Start: 2023-05-10 | End: 2023-05-10

## 2023-05-10 RX ORDER — HYDRALAZINE HYDROCHLORIDE 20 MG/ML
10 INJECTION INTRAMUSCULAR; INTRAVENOUS EVERY 10 MIN PRN
Status: DISCONTINUED | OUTPATIENT
Start: 2023-05-10 | End: 2023-05-11 | Stop reason: HOSPADM

## 2023-05-10 RX ORDER — ONDANSETRON 2 MG/ML
4 INJECTION INTRAMUSCULAR; INTRAVENOUS EVERY 6 HOURS PRN
Status: DISCONTINUED | OUTPATIENT
Start: 2023-05-10 | End: 2023-05-11 | Stop reason: HOSPADM

## 2023-05-10 RX ORDER — CLOPIDOGREL 300 MG/1
600 TABLET, FILM COATED ORAL ONCE
Status: DISCONTINUED | OUTPATIENT
Start: 2023-05-10 | End: 2023-05-10

## 2023-05-10 RX ORDER — MIDAZOLAM HYDROCHLORIDE 1 MG/ML
INJECTION INTRAMUSCULAR; INTRAVENOUS
Status: COMPLETED | OUTPATIENT
Start: 2023-05-10 | End: 2023-05-10

## 2023-05-10 RX ORDER — SODIUM CHLORIDE 0.9 % (FLUSH) 0.9 %
5-40 SYRINGE (ML) INJECTION EVERY 12 HOURS SCHEDULED
Status: DISCONTINUED | OUTPATIENT
Start: 2023-05-10 | End: 2023-05-11 | Stop reason: HOSPADM

## 2023-05-10 RX ORDER — SODIUM CHLORIDE 9 MG/ML
INJECTION, SOLUTION INTRAVENOUS PRN
Status: DISCONTINUED | OUTPATIENT
Start: 2023-05-10 | End: 2023-05-11 | Stop reason: HOSPADM

## 2023-05-10 RX ORDER — CLOPIDOGREL 300 MG/1
300 TABLET, FILM COATED ORAL ONCE
Status: COMPLETED | OUTPATIENT
Start: 2023-05-10 | End: 2023-05-10

## 2023-05-10 RX ORDER — FENTANYL CITRATE 50 UG/ML
50 INJECTION, SOLUTION INTRAMUSCULAR; INTRAVENOUS
Status: DISCONTINUED | OUTPATIENT
Start: 2023-05-10 | End: 2023-05-11 | Stop reason: HOSPADM

## 2023-05-10 RX ADMIN — FENTANYL CITRATE 25 MCG: 50 INJECTION, SOLUTION INTRAMUSCULAR; INTRAVENOUS at 10:42

## 2023-05-10 RX ADMIN — CALCIUM ACETATE 667 MG: 667 CAPSULE ORAL at 15:58

## 2023-05-10 RX ADMIN — HYDROMORPHONE HYDROCHLORIDE 0.5 MG: 1 INJECTION, SOLUTION INTRAMUSCULAR; INTRAVENOUS; SUBCUTANEOUS at 22:01

## 2023-05-10 RX ADMIN — FENTANYL CITRATE 25 MCG: 50 INJECTION, SOLUTION INTRAMUSCULAR; INTRAVENOUS at 09:28

## 2023-05-10 RX ADMIN — SODIUM CHLORIDE, PRESERVATIVE FREE 10 ML: 5 INJECTION INTRAVENOUS at 08:13

## 2023-05-10 RX ADMIN — METOPROLOL SUCCINATE 100 MG: 50 TABLET, EXTENDED RELEASE ORAL at 21:34

## 2023-05-10 RX ADMIN — LORAZEPAM 0.5 MG: 0.5 TABLET ORAL at 15:57

## 2023-05-10 RX ADMIN — HYDRALAZINE HYDROCHLORIDE 10 MG: 20 INJECTION INTRAMUSCULAR; INTRAVENOUS at 22:08

## 2023-05-10 RX ADMIN — Medication: at 22:35

## 2023-05-10 RX ADMIN — FENTANYL CITRATE 25 MCG: 50 INJECTION, SOLUTION INTRAMUSCULAR; INTRAVENOUS at 10:58

## 2023-05-10 RX ADMIN — DULOXETINE HYDROCHLORIDE 60 MG: 60 CAPSULE, DELAYED RELEASE ORAL at 08:04

## 2023-05-10 RX ADMIN — GABAPENTIN 100 MG: 100 CAPSULE ORAL at 22:01

## 2023-05-10 RX ADMIN — FENTANYL CITRATE 25 MCG: 50 INJECTION, SOLUTION INTRAMUSCULAR; INTRAVENOUS at 09:45

## 2023-05-10 RX ADMIN — PRAVASTATIN SODIUM 40 MG: 40 TABLET ORAL at 08:05

## 2023-05-10 RX ADMIN — QUETIAPINE FUMARATE 50 MG: 25 TABLET ORAL at 21:34

## 2023-05-10 RX ADMIN — ISOSORBIDE DINITRATE 20 MG: 20 TABLET ORAL at 16:04

## 2023-05-10 RX ADMIN — ASPIRIN 325 MG: 325 TABLET ORAL at 08:04

## 2023-05-10 RX ADMIN — CLOPIDOGREL 300 MG: 300 TABLET, FILM COATED ORAL at 11:07

## 2023-05-10 RX ADMIN — ACETAMINOPHEN 650 MG: 325 TABLET ORAL at 21:34

## 2023-05-10 RX ADMIN — ISOSORBIDE DINITRATE 20 MG: 20 TABLET ORAL at 21:34

## 2023-05-10 RX ADMIN — HYDROMORPHONE HYDROCHLORIDE 0.5 MG: 1 INJECTION, SOLUTION INTRAMUSCULAR; INTRAVENOUS; SUBCUTANEOUS at 18:56

## 2023-05-10 RX ADMIN — CLOPIDOGREL BISULFATE 75 MG: 75 TABLET ORAL at 08:04

## 2023-05-10 RX ADMIN — ISOSORBIDE DINITRATE 20 MG: 20 TABLET ORAL at 08:05

## 2023-05-10 RX ADMIN — HYDROMORPHONE HYDROCHLORIDE 0.5 MG: 1 INJECTION, SOLUTION INTRAMUSCULAR; INTRAVENOUS; SUBCUTANEOUS at 15:58

## 2023-05-10 RX ADMIN — APIXABAN 2.5 MG: 2.5 TABLET, FILM COATED ORAL at 21:34

## 2023-05-10 RX ADMIN — SODIUM CHLORIDE, PRESERVATIVE FREE 10 ML: 5 INJECTION INTRAVENOUS at 22:22

## 2023-05-10 RX ADMIN — MIDAZOLAM HYDROCHLORIDE 1 MG: 1 INJECTION INTRAMUSCULAR; INTRAVENOUS at 09:28

## 2023-05-10 RX ADMIN — PANTOPRAZOLE SODIUM 40 MG: 40 TABLET, DELAYED RELEASE ORAL at 08:12

## 2023-05-10 RX ADMIN — TORSEMIDE 100 MG: 100 TABLET ORAL at 08:04

## 2023-05-10 RX ADMIN — CYCLOBENZAPRINE 10 MG: 10 TABLET, FILM COATED ORAL at 21:34

## 2023-05-10 RX ADMIN — FENTANYL CITRATE 25 MCG: 50 INJECTION, SOLUTION INTRAMUSCULAR; INTRAVENOUS at 10:37

## 2023-05-10 RX ADMIN — MIDAZOLAM HYDROCHLORIDE 1 MG: 1 INJECTION INTRAMUSCULAR; INTRAVENOUS at 09:45

## 2023-05-10 RX ADMIN — METOPROLOL SUCCINATE 100 MG: 50 TABLET, EXTENDED RELEASE ORAL at 08:07

## 2023-05-10 ASSESSMENT — PAIN DESCRIPTION - LOCATION
LOCATION: BACK;GROIN
LOCATION: GROIN
LOCATION: BACK
LOCATION: BACK

## 2023-05-10 ASSESSMENT — PAIN SCALES - GENERAL
PAINLEVEL_OUTOF10: 10
PAINLEVEL_OUTOF10: 8
PAINLEVEL_OUTOF10: 2
PAINLEVEL_OUTOF10: 10
PAINLEVEL_OUTOF10: 10

## 2023-05-10 ASSESSMENT — PAIN DESCRIPTION - ORIENTATION
ORIENTATION: LOWER
ORIENTATION: RIGHT
ORIENTATION: LOWER

## 2023-05-10 ASSESSMENT — PAIN DESCRIPTION - DESCRIPTORS
DESCRIPTORS: ACHING;SORE;THROBBING
DESCRIPTORS: ACHING

## 2023-05-10 NOTE — PRE SEDATION
Medical History:    Past Medical History:   Diagnosis Date    Ambulatory dysfunction     walker for long distances, SOB with distance    Aortic stenosis     echo 2017    Arthritis     hands and hips    Asthma     Bilateral hilar adenopathy syndrome 6/3/2013    CAD (coronary artery disease)     Dr. Kyleigh Nogueira [facility]) 04/19/2019    EF= 43%    CHF (congestive heart failure) ([address]     Chronic pain     COPD (chronic obstructive pulmonary disease) ([address]     pulmonology Dr. Maria C Flores    Depression     Diabetes mellitus ([address]     borderline    Difficult intravenous access     Emphysema of lung ([address]     ESRD (end stage renal disease) on dialysis ([address]     MWF    Fear of needles     Gastric ulcer     GERD (gastroesophageal reflux disease)     Heart valve problem     bicuspic valve    Hemodialysis patient ([address]     History of spinal fracture     work incident    Hx of blood clots     Bilateral lower extremities; stents in place    Hyperlipidemia     Hypertension     MI (myocardial infarction) ([address] 2019    has had 9 MIs. 2019 was the last    Neuromuscular disorder ([address]     due to CVA    Numbness and tingling in left arm     from fistula    Pneumonia     PONV (postoperative nausea and vomiting)     Prolonged emergence from general anesthesia     States requires more medication than most people    Sleep apnea     Uses CPAP    Stroke ([address]     7mm thalamic cva 2017 deficts left side, left side weakness    TIA (transient ischemic attack)     Unspecified diseases of blood and blood-forming organs        Surgical History:    Past Surgical History:   Procedure Laterality Date    AORTIC VALVE REPLACEMENT N/A 10/15/2019    TRANSCATHETER AORTIC VALVE REPLACEMENT FEMORAL APPROACH performed by Maritza Morocho MD at [facility] Right 7/2/2019    PERITONEAL DIALYSIS CATHETER REMOVAL performed by Scott Gaffney MD at [address] [place] [address]  2/29/2015    WN    CORONARY

## 2023-05-10 NOTE — PROCEDURES
[address]       Cardiac Catheterization Lab Report    PATIENT: Thu Jin  DATE: 5/10/2023  MRN: [id number]  CSN: [id number]  : 1970      Performing Physician: Christy Cortez MD, JENNIFER, [place], [place]  Primary Care Physician: Manny Moses MD  Admitting/Referring provider: Hyacinth Burch MD     Procedures Performed:   1. Left heart catheterization  2. Selective left and right coronary angiogram  3. Left ventriculography   4. Intravascular ultrasound of the ramus intermedius branch  5.  Drug-eluting stent insertion of the ramus intermediate branch  6. Intravascular ultrasound of the circumflex coronary artery  7. Drug-eluting stent of the circumflex coronary artery  8. Intravascular ultrasound of the right coronary artery  9. Cutting Balloon angioplasty and drug-eluting stent insertion to the right coronary artery  10. CPT Code: 78174 US guidance for vascular access  NOTE: Ultrasound access was used to access the femoral artery using the modified seldinger technique after local infiltration of 1-2% lidocaine. Ultrasound documented/visualized patency, pulsatility, and proper location for puncture. It determined safety to proceed with access. Image (s) was printed off [employer] and/or sent to medical records for documentation. Procedure Findings:  1. Severe multi vessel coronary artery diease   ~50% mid LAD   ~90% proximal ramus intermediate branch   ~90% proximal circumflex stenosis   ~95% mid right coronary artery restenosis of previous drug-eluting stents along with 70% proximal atherosclerosis  2. Reduced left ventricular function with ejection fraction of 30 to 35% and global hypokinesis  3. Elevated left ventricular end-diastolic pressure at 24.     Indications:   Patient Active Problem List   Diagnosis    Sepsis without acute organ dysfunction (HCC)    CHF (congestive heart failure) (HCC)    Asthma-COPD overlap syndrome (HCC)    CAD in native artery    PVD

## 2023-05-10 NOTE — CONSULTS
Brief Pre-Op Note/Sedation Assessment      Pily Lopez  1970  [id number]  9:19 AM    Planned Procedure: Cardiac Catheterization Procedure  Post Procedure Plan: Return to same level of care  Consent: I have discussed with the patient and/or the patient representative the indication, alternatives, and the possible risks and/or complications of the planned procedure and the anesthesia methods. The patient and/or patient representative appear to understand and agree to proceed. Chief Complaint:   Chest Pain/Pressure      Indications for Cath Procedure:  Presentation:  New Onset Angina <= 2 months  2. Anginal Classification within 2 weeks:  CCS III - Symptoms with everyday living activities, i.e., moderate limitation  3. Angina Symptoms Assessment:  Typical Chest Pain  4. Heart Failure Class within last 2 weeks:  Yes:  Heart Failure Type: Systolic Severity:  Class III - Symptoms of HF on less-than-ordinary exertion  5. Cardiovascular Instability:  No    Prior Ischemic Workup/Eval:  Pre-Procedural Medications: Yes: Beta Blockers, Long Acting Nitrates (Any), and STATIN  2. Stress Test Completed? Yes:  Stress or Imaging Studies Performed (within ANY time period):   Type:  Stress Nuclear  Results:  Positive:  Myocardial Perfusion Defects (Nuclear) Extent of Ischemia:  Intermediate    Does Patient need surgery? Cath Valve Surgery:  No    Pre-Procedure Medical History:  Vital Signs:  BP (!) 143/66   Pulse 59   Temp 97.7 °F (36.5 °C) (Axillary)   Resp 20   Ht 5' 9\" (1.753 m)   Wt 233 lb 14.5 oz (106.1 kg)   SpO2 98%   PF 56 L/min   BMI 34.54 kg/m²     Allergies:     Allergies   Allergen Reactions    Morphine Nausea And Vomiting     Medications:    Current Facility-Administered Medications   Medication Dose Route Frequency Provider Last Rate Last Admin    sodium chloride flush 0.9 % injection 5-40 mL  5-40 mL IntraVENous 2 times per day Linh Mariee MD   10 mL at 05/10/23 0813    sodium
procedure, including myocardial infarction, stroke, death, vascular complications, and the possible need for emergent surgery. 2. Keep NPO   3. Pre-cath orders will be placed. 4.  Eliquis has been held for 24 hours. Medical Decision Making: The following items were considered in medical decision making:  Independent review of images  Review / order clinical lab tests  Review / order radiology tests  Decision to obtain old records  Review and summation of old records as accessed through [facility] (a summary of my findings in these old records)      Time Based Itemization  A total of 80 minutes was spent on today's patient encounter. If applicable, non-patient-facing activities:  (X )Preparing to see the patient and reviewing records  (X ) Individual interpretation of results  ( ) Discussion or coordination of care with other health care professionals  ( ) Ordering of unique tests, medications, or procedures  (X ) Documentation within the EHR       All questions and concerns were addressed to the patient/family. Alternatives to my treatment were discussed. The note was completed using EMR. Every effort was made to ensure accuracy; however, inadvertent computerized transcription errors may be present.     Jhonny Chen MD, [place], [place]  [phone] [facility]  [phone] [facility] central  5/10/2023  11:37 AM

## 2023-05-10 NOTE — POST SEDATION
Patient:  Louis Lopez   :   1970    A pre-sedation re-evaluation was performed immediately at the end of the procedure. Procedure:  Cardiac cath  Medications: Procedural sedation with minimal conscious sedation  Complications: None  Estimated Blood Loss: none  Specimens: Were not obtained        [place] Medication and Procedural Reconciliation:  I agree that the documented medications and procedures performed are true. The medications were given under my order. The procedures were performed under my direct supervision.

## 2023-05-11 ENCOUNTER — HOSPITAL ENCOUNTER (OUTPATIENT)
Dept: WOUND CARE | Age: 53
Discharge: HOME | End: 2023-05-11

## 2023-05-11 VITALS
HEIGHT: 69 IN | RESPIRATION RATE: 18 BRPM | WEIGHT: 235.89 LBS | HEART RATE: 57 BPM | BODY MASS INDEX: 34.94 KG/M2 | DIASTOLIC BLOOD PRESSURE: 63 MMHG | SYSTOLIC BLOOD PRESSURE: 117 MMHG | OXYGEN SATURATION: 100 % | TEMPERATURE: 97.4 F

## 2023-05-11 LAB
ALBUMIN SERPL-MCNC: 3.6 G/DL (ref 3.4–5)
ANION GAP SERPL CALCULATED.3IONS-SCNC: 18 MMOL/L (ref 3–16)
BUN SERPL-MCNC: 66 MG/DL (ref 7–20)
CALCIUM SERPL-MCNC: 8.8 MG/DL (ref 8.3–10.6)
CHLORIDE SERPL-SCNC: 82 MMOL/L (ref 99–110)
CO2 SERPL-SCNC: 19 MMOL/L (ref 21–32)
CREAT SERPL-MCNC: 7.3 MG/DL (ref 0.9–1.3)
DEPRECATED RDW RBC AUTO: 17 % (ref 12.4–15.4)
GFR SERPLBLD CREATININE-BSD FMLA CKD-EPI: 8 ML/MIN/{1.73_M2}
GLUCOSE BLD-MCNC: 172 MG/DL (ref 70–99)
GLUCOSE BLD-MCNC: 241 MG/DL (ref 70–99)
GLUCOSE SERPL-MCNC: 249 MG/DL (ref 70–99)
HCT VFR BLD AUTO: 28.8 % (ref 40.5–52.5)
HGB BLD-MCNC: 9.4 G/DL (ref 13.5–17.5)
MCH RBC QN AUTO: 29.2 PG (ref 26–34)
MCHC RBC AUTO-ENTMCNC: 32.7 G/DL (ref 31–36)
MCV RBC AUTO: 89.2 FL (ref 80–100)
PERFORMED ON: ABNORMAL
PERFORMED ON: ABNORMAL
PHOSPHATE SERPL-MCNC: 7.4 MG/DL (ref 2.5–4.9)
PLATELET # BLD AUTO: 320 K/UL (ref 135–450)
PMV BLD AUTO: 7.2 FL (ref 5–10.5)
POTASSIUM SERPL-SCNC: 5.6 MMOL/L (ref 3.5–5.1)
RBC # BLD AUTO: 3.23 M/UL (ref 4.2–5.9)
SODIUM SERPL-SCNC: 119 MMOL/L (ref 136–145)
WBC # BLD AUTO: 9.7 K/UL (ref 4–11)

## 2023-05-11 PROCEDURE — 36415 COLL VENOUS BLD VENIPUNCTURE: CPT

## 2023-05-11 PROCEDURE — 2500000003 HC RX 250 WO HCPCS: Performed by: INTERNAL MEDICINE

## 2023-05-11 PROCEDURE — 90935 HEMODIALYSIS ONE EVALUATION: CPT

## 2023-05-11 PROCEDURE — 94761 N-INVAS EAR/PLS OXIMETRY MLT: CPT

## 2023-05-11 PROCEDURE — 97110 THERAPEUTIC EXERCISES: CPT

## 2023-05-11 PROCEDURE — 97530 THERAPEUTIC ACTIVITIES: CPT

## 2023-05-11 PROCEDURE — 6360000002 HC RX W HCPCS

## 2023-05-11 PROCEDURE — 6370000000 HC RX 637 (ALT 250 FOR IP): Performed by: INTERNAL MEDICINE

## 2023-05-11 PROCEDURE — 2700000000 HC OXYGEN THERAPY PER DAY

## 2023-05-11 PROCEDURE — 6360000002 HC RX W HCPCS: Performed by: INTERNAL MEDICINE

## 2023-05-11 PROCEDURE — 85027 COMPLETE CBC AUTOMATED: CPT

## 2023-05-11 PROCEDURE — 94660 CPAP INITIATION&MGMT: CPT

## 2023-05-11 PROCEDURE — 80069 RENAL FUNCTION PANEL: CPT

## 2023-05-11 RX ORDER — GABAPENTIN 100 MG/1
100 CAPSULE ORAL 3 TIMES DAILY
Qty: 90 CAPSULE | Refills: 3 | Status: SHIPPED | OUTPATIENT
Start: 2023-05-11 | End: 2023-06-10

## 2023-05-11 RX ORDER — MIDODRINE HYDROCHLORIDE 10 MG/1
10 TABLET ORAL PRN
Qty: 90 TABLET | Refills: 0 | Status: SHIPPED | OUTPATIENT
Start: 2023-05-11 | End: 2023-06-10

## 2023-05-11 RX ORDER — CYCLOBENZAPRINE HCL 10 MG
10 TABLET ORAL 3 TIMES DAILY PRN
Qty: 30 TABLET | Refills: 0 | Status: SHIPPED | OUTPATIENT
Start: 2023-05-11 | End: 2023-05-21

## 2023-05-11 RX ORDER — HEPARIN SODIUM 1000 [USP'U]/ML
INJECTION, SOLUTION INTRAVENOUS; SUBCUTANEOUS
Status: COMPLETED
Start: 2023-05-11 | End: 2023-05-11

## 2023-05-11 RX ADMIN — CALCIUM ACETATE 667 MG: 667 CAPSULE ORAL at 11:24

## 2023-05-11 RX ADMIN — HEPARIN SODIUM 3600 UNITS: 1000 INJECTION INTRAVENOUS; SUBCUTANEOUS at 16:25

## 2023-05-11 RX ADMIN — TORSEMIDE 100 MG: 100 TABLET ORAL at 10:14

## 2023-05-11 RX ADMIN — PRAVASTATIN SODIUM 40 MG: 40 TABLET ORAL at 10:14

## 2023-05-11 RX ADMIN — HYDROMORPHONE HYDROCHLORIDE 0.5 MG: 1 INJECTION, SOLUTION INTRAMUSCULAR; INTRAVENOUS; SUBCUTANEOUS at 11:24

## 2023-05-11 RX ADMIN — METOPROLOL SUCCINATE 100 MG: 50 TABLET, EXTENDED RELEASE ORAL at 10:15

## 2023-05-11 RX ADMIN — CALCIUM ACETATE 667 MG: 667 CAPSULE ORAL at 10:15

## 2023-05-11 RX ADMIN — ISOSORBIDE DINITRATE 20 MG: 20 TABLET ORAL at 10:15

## 2023-05-11 RX ADMIN — CLOPIDOGREL BISULFATE 75 MG: 75 TABLET ORAL at 10:14

## 2023-05-11 RX ADMIN — HYDROMORPHONE HYDROCHLORIDE 0.5 MG: 1 INJECTION, SOLUTION INTRAMUSCULAR; INTRAVENOUS; SUBCUTANEOUS at 01:10

## 2023-05-11 RX ADMIN — DULOXETINE HYDROCHLORIDE 60 MG: 60 CAPSULE, DELAYED RELEASE ORAL at 10:15

## 2023-05-11 RX ADMIN — APIXABAN 2.5 MG: 2.5 TABLET, FILM COATED ORAL at 10:15

## 2023-05-11 RX ADMIN — HEPARIN SODIUM 3600 UNITS: 1000 INJECTION, SOLUTION INTRAVENOUS; SUBCUTANEOUS at 16:25

## 2023-05-11 RX ADMIN — PANTOPRAZOLE SODIUM 40 MG: 40 TABLET, DELAYED RELEASE ORAL at 05:53

## 2023-05-11 ASSESSMENT — PAIN DESCRIPTION - DESCRIPTORS: DESCRIPTORS: STABBING;TIGHTNESS

## 2023-05-11 ASSESSMENT — PAIN SCALES - GENERAL
PAINLEVEL_OUTOF10: 7
PAINLEVEL_OUTOF10: 4
PAINLEVEL_OUTOF10: 8
PAINLEVEL_OUTOF10: 8

## 2023-05-11 ASSESSMENT — PAIN DESCRIPTION - LOCATION: LOCATION: BACK

## 2023-05-11 ASSESSMENT — PAIN - FUNCTIONAL ASSESSMENT: PAIN_FUNCTIONAL_ASSESSMENT: ACTIVITIES ARE NOT PREVENTED

## 2023-05-11 NOTE — PROGRESS NOTES
05/08/23 0031   NIV Type   $NIV $Daily Charge   NIV Started/Stopped On   Equipment Type v60   Mode Bilevel   Mask Type Full face mask   Mask Size Medium   Settings/Measurements   PIP Observed 14 cm H20   IPAP 15 cmH20   CPAP/EPAP 8 cmH2O   Vt (Measured) 620 mL   Rate Ordered 16   Respirations 24   FiO2  35 %   Minute Volume (L/min) 14.5 Liters   Mask Leak (lpm) 41 lpm   Comfort Level Good   Using Accessory Muscles No   SpO2 96   Patient's Home Machine No   Alarm Settings   Alarms On Y   Low Pressure (cmH2O) 6 cmH2O   High Pressure (cmH2O) 30 cmH2O   RR Low (bpm) 6   RR High (bpm) 40 br/min
05/08/23 0405   NIV Type   Skin Protection for O2 Device No  (pt refuses)   Equipment Type v60   Mode Bilevel   Mask Type Full face mask   Mask Size Medium   Settings/Measurements   PIP Observed 15 cm H20   IPAP 15 cmH20   CPAP/EPAP 8 cmH2O   Vt (Measured) 500 mL   Rate Ordered 16   Respirations 17   FiO2  35 %   I Time/ I Time % 1 s   Minute Volume (L/min) 8.5 Liters   Comfort Level Good   Using Accessory Muscles No   SpO2 97   Patient's Home Machine No   Alarm Settings   Alarms On Y   Low Pressure (cmH2O) 6 cmH2O   High Pressure (cmH2O) 30 cmH2O   Apnea (secs) 20 secs   RR Low (bpm) 6   RR High (bpm) 40 br/min
05/08/23 2102   NIV Type   Skin Assessment Clean, dry, & intact   Skin Protection for O2 Device No   NIV Started/Stopped On   Equipment Type v60   Mode Bilevel   Mask Type Full face mask   Mask Size Medium   Settings/Measurements   PIP Observed 14 cm H20   IPAP 15 cmH20   CPAP/EPAP 8 cmH2O   Vt (Measured) 697 mL   Rate Ordered 16   Respirations 20   FiO2  35 %   I Time/ I Time % 1 s   Minute Volume (L/min) 14.7 Liters   Mask Leak (lpm) 22 lpm   Comfort Level Good   Using Accessory Muscles No   SpO2 96   Alarm Settings   Alarms On Y   Low Pressure (cmH2O) 6 cmH2O   High Pressure (cmH2O) 30 cmH2O   Apnea (secs) 20 secs   RR Low (bpm) 6   RR High (bpm) 40 br/min
05/08/23 6942   NIV Type   NIV Started/Stopped On   Equipment Type v60   Mode Bilevel   Mask Type Full face mask   Mask Size Medium   Settings/Measurements   PIP Observed 14 cm H20   IPAP 15 cmH20   CPAP/EPAP 8 cmH2O   Vt (Measured) 915 mL   Rate Ordered 16   Respirations 19   FiO2  35 %   I Time/ I Time % 1 s   Minute Volume (L/min) 14.7 Liters   Mask Leak (lpm) 20 lpm   Comfort Level Good   Using Accessory Muscles No   SpO2 96   Patient's Home Machine No   Alarm Settings   Alarms On Y   Low Pressure (cmH2O) 6 cmH2O   High Pressure (cmH2O) 30 cmH2O   RR Low (bpm) 6   RR High (bpm) 40 br/min
05/09/23 0353   NIV Type   $NIV $Daily Charge   Skin Protection for O2 Device No  (pt refuses)   Equipment Type v60   Mode Bilevel   Mask Type Full face mask   Mask Size Medium   Settings/Measurements   PIP Observed 15 cm H20   IPAP 15 cmH20   CPAP/EPAP 8 cmH2O   Vt (Measured) 508 mL   Rate Ordered 16   Respirations 17   FiO2  35 %   I Time/ I Time % 1 s   Minute Volume (L/min) 9 Liters   Comfort Level Good   Using Accessory Muscles No   SpO2 95   Patient's Home Machine No   Alarm Settings   Alarms On Y   Low Pressure (cmH2O) 6 cmH2O   High Pressure (cmH2O) 30 cmH2O   Apnea (secs) 20 secs   RR Low (bpm) 6   RR High (bpm) 40 br/min
05/09/23 2151   NIV Type   NIV Started/Stopped On   Equipment Type v60   Mode Bilevel   Mask Type Full face mask   Mask Size Medium   Settings/Measurements   IPAP 15 cmH20   CPAP/EPAP 8 cmH2O   Vt (Measured) 608 mL   Rate Ordered 16   Respirations 20   FiO2  35 %   Mask Leak (lpm) 18 lpm   Comfort Level Good   Using Accessory Muscles No   SpO2 97   Patient's Home Machine No   Alarm Settings   Alarms On Y   Low Pressure (cmH2O) 6 cmH2O   High Pressure (cmH2O) 30 cmH2O   Breath Sounds   Right Upper Lobe Diminished   Right Middle Lobe Diminished   Right Lower Lobe Diminished   Left Upper Lobe Diminished   Left Lower Lobe Diminished
05/10/23 0000   NIV Type   $NIV $Daily Charge   NIV Started/Stopped On   Equipment Type v60   Mode Bilevel   Mask Type Full face mask   Mask Size Medium   Settings/Measurements   IPAP 15 cmH20   CPAP/EPAP 8 cmH2O   Vt (Measured) 592 mL   Rate Ordered 16   Respirations 21   FiO2  35 %   Mask Leak (lpm) 30 lpm   Comfort Level Good   Using Accessory Muscles No   SpO2 98   Patient's Home Machine No   Alarm Settings   Alarms On Y   Low Pressure (cmH2O) 6 cmH2O   High Pressure (cmH2O) 30 cmH2O   Patient Observation   Observations refusing mepilex on nose
05/10/23 0353   NIV Type   NIV Started/Stopped On   Equipment Type v60   Mode Bilevel   Mask Type Full face mask   Mask Size Medium   Settings/Measurements   IPAP 15 cmH20   CPAP/EPAP 8 cmH2O   Vt (Measured) 568 mL   Rate Ordered 16   Respirations 22   FiO2  35 %   Mask Leak (lpm) 49 lpm   Comfort Level Good   Using Accessory Muscles No   Patient's Home Machine No
05/11/23 0036   NIV Type   $NIV $Daily Charge   NIV Started/Stopped On   Equipment Type v60   Mode Bilevel   Mask Type Full face mask   Mask Size Medium   Settings/Measurements   PIP Observed 15 cm H20   IPAP 15 cmH20   CPAP/EPAP 8 cmH2O   Vt (Measured) 439 mL   Rate Ordered 16   Respirations 18   FiO2  35 %   I Time/ I Time % 1 s   Minute Volume (L/min) 7.1 Liters   Mask Leak (lpm) 11 lpm   Comfort Level Good   Using Accessory Muscles No   SpO2 96   Patient's Home Machine No   Alarm Settings   Alarms On Y   Low Pressure (cmH2O) 6 cmH2O   High Pressure (cmH2O) 30 cmH2O   RR Low (bpm) 6   RR High (bpm) 40 br/min
05/11/23 0451   Oxygen Therapy/Pulse Ox   O2 Device Nasal cannula   O2 Flow Rate (L/min) 4 L/min   SpO2 97 %
A lexiscan stress test was completed on this patient as ordered. The patient tolerated the procedure well. Awaiting stress imaging at this time.
[address]   Daily Progress Note    Admit Date:  5/1/2023  HPI:    Chief Complaint   Patient presents with    Shortness of Breath     Pt state that he stated with increase wob this am pt is normally on 4 lit of oxygen at home and is currently 100% on 3 lit of oxygen        Interval history: Regina Iraheta is being followed for chest pain. Hx of CAD s/p PCI multiple, afib, ESRD on HD, S/P TAVR, CHF, PAD, HLD, anemia, COPD. Subjective:  Mr. Roseanne Garza completed dialysis today, complicated with hypotension. Complains of having chest tightness today. On his way down for the first portion of the stress test, resting features today.     Objective:   /63   Pulse 64   Temp 97.6 °F (36.4 °C) (Oral)   Resp 18   Ht 5' 9\" (1.753 m)   Wt 222 lb 7.1 oz (100.9 kg)   SpO2 98%   PF 56 L/min   BMI 32.85 kg/m²     Intake/Output Summary (Last 24 hours) at 5/8/2023 1643  Last data filed at 5/8/2023 1608  Gross per 24 hour   Intake 880 ml   Output 3000 ml   Net -2120 ml       NYHA: IV    Physical Exam:  General:  Awake, alert, NAD, able to ambulate from the bathroom  Skin:  Warm and dry  Neck:  JVD<8  Chest: Diminished throughout to auscultation, no wheezes/rhonchi/rales, Vas-Cath in place  Telemetry reviewed by me independently and showed: Normal sinus rates 60s  Cardiovascular:  RRR S1S2, no m/r/g   Abdomen:  Soft, nontender, +bowel sounds  Extremities: No bilateral lower extremity edema    Medications:    insulin lispro  0-4 Units SubCUTAneous TID WC    insulin lispro  0-4 Units SubCUTAneous Nightly    povidone-iodine   Topical Daily    apixaban  2.5 mg Oral BID    calcium acetate  1 capsule Oral TID WC    clopidogrel  75 mg Oral Daily    DULoxetine  60 mg Oral Daily    isosorbide dinitrate  20 mg Oral TID    metoprolol succinate  100 mg Oral BID    pantoprazole  40 mg Oral QAM AC    pravastatin  40 mg Oral Daily    QUEtiapine  50 mg Oral Nightly    torsemide  100 mg Oral Daily    sodium chloride
Comprehensive Nutrition Assessment    Type and Reason for Visit:  Reassess    Nutrition Recommendations/Plan:   Continue carb control, low potassium, low phosphorus diet   Continue Nepro daily   Encourage PO intakes   Monitor nutrition adequacy, pertinent labs, bowel habits, wt changes, and clinical progress     Malnutrition Assessment:  Malnutrition Status: At risk for malnutrition (Comment) (05/02/23 7831)    Context:  Acute Illness     Findings of the 6 clinical characteristics of malnutrition:  Energy Intake:  Mild decrease in energy intake (Comment)    Nutrition Assessment:    Follow up: Pt remaisn nutritionally at risk AEB variable PO intakes and wound. Pt unavailable this date at stress test and dialysis today. Diet resumed carb control, low potassium, low phosphorus diet. Nepro resumed, intakes of % of ONS. Will continue to monitor. Nutrition Related Findings:    Active BS. BM on 5/7. Wound Type: Pressure Injury, Stage III, Diabetic Ulcer       Current Nutrition Intake & Therapies:    Average Meal Intake: 1-25%, 51-75%, %  Average Supplements Intake: %  ADULT DIET; Regular; 4 carb choices (60 gm/meal); Low Potassium (Less than 3000 mg/day); Low Phosphorus (Less than 1000 mg); 1000 ml  ADULT ORAL NUTRITION SUPPLEMENT; Breakfast; Renal Oral Supplement    Anthropometric Measures:  Height: 5' 9\" (175.3 cm)  Ideal Body Weight (IBW): 160 lbs (73 kg)    Admission Body Weight: 234 lb (106.1 kg) (bed scale)  Current Body Weight: 228 lb (103.4 kg), 171.3 % IBW. Weight Source: Standing Scale  Current BMI (kg/m2): 33.7                          BMI Categories: Obese Class 1 (BMI 30.0-34. 9)    Estimated Daily Nutrient Needs:  Energy Requirements Based On: Kcal/kg (25-30)  Weight Used for Energy Requirements: Ideal  Energy (kcal/day): 0180-3423 kcal  Weight Used for Protein Requirements: Ideal (1.0-1.2 g/kg)  Protein (g/day): 73-88 g     Fluid (ml/day): 1000 mL FR    Nutrition Diagnosis:   Inadequate
Hospital Medicine Progress Note      Date of Admission: 5/1/2023  Hospital Day: 10    Chief Admission Complaint: SOB     Subjective:  no new c/o. Presenting Admission History:      \"52 y.o. obese male w/ hx O2 dependent COPD/CHF at baseline home O2 at 3L who presented to [facility] with a 1 day hx of acute onset progressive SOB/CASH. He is on HD M/W/F w/ Dr. Sheyla Walker and had a full run on Friday 28 Jan but was unable to go to HD 1 May due to sxs. The patient denies any fever/chills or other signs/sxs of systemic illness or identifiable aggravating/alleviating factors. \"       Assessment/Plan:       ESRD - regular HD M/W/F. Nephrology consulted and appreciated in advance. Acute on chronic systolic heart failure. Patient with elevated BNP. Patient is a dialysis patient. Volume management per nephrology. Acute Respiratory Failure - w/ hypoxia, POArrival.  Presence of clinical respiratory distress w/ tachypnea/dyspnea/SOB and wheezing w/ use of accessory muscles to breath. Supplemental O2 weaned as tolerated. Type 2 diabetes. SSI. Foot wound - Podiatry consulted and appreciated in advance. Afib - chronic paroxysmal of unspecified and clinically unable to determine etiology. Normally rate controlled on BBlocker - continued. Anticoagulated at baseline on home Eliquis due to secondary hypercoaguable state due to Afib - continued. Monitored on tele. HTN/CAD - w/ known CAD but no evidence of active signs/sxs of ischemia/failure. Currently controlled on home meds w/ vitals reviewed and documented as above. Patient had abnormal stress this admission w/ [facility] planned 10 May. S/P TAVR - Continue Plavix/Pravastatin. Foot wounds - bilateral stable gangrenous eschars. Podiatry consulted and appreciated w/ currently recs for local wound care only. Vascular Surgery consulted and appreciated and felt that perfusion results not c/w need for intervention.
Hospital Medicine Progress Note      Date of Admission: 5/1/2023  Hospital Day: 11    Chief Admission Complaint: SOB     Subjective:  no new c/o. Presenting Admission History:      \"52 y.o. obese male w/ hx O2 dependent COPD/CHF at baseline home O2 at 3L who presented to [facility] with a 1 day hx of acute onset progressive SOB/CASH. He is on HD M/W/F w/ Dr. Aston Marmolejo and had a full run on Friday 28 Jan but was unable to go to HD 1 May due to sxs. The patient denies any fever/chills or other signs/sxs of systemic illness or identifiable aggravating/alleviating factors. \"       Assessment/Plan:       ESRD - regular HD M/W/F. Nephrology consulted and appreciated      CHF - acute on chronic combined sytolic failure w/ reduced/preserved EF 25-30% by Echo March 2023. Likely due to hypertensive heart disease. Continue current medical management and follow I/O as well as clinical response. Acute Respiratory Failure - w/ hypoxia, POArrival.  Presence of clinical respiratory distress w/ tachypnea/dyspnea/SOB and wheezing w/ use of accessory muscles to breath. Supplemental O2 weaned as tolerated. Type 2 diabetes. SSI. Foot wound - Podiatry consulted and appreciated. Afib - chronic paroxysmal of unspecified and clinically unable to determine etiology. Normally rate controlled on BBlocker - continued. Anticoagulated at baseline on home Eliquis due to secondary hypercoaguable state due to Afib - continued. Monitored on tele. HTN/CAD - w/ known CAD but no evidence of active signs/sxs of ischemia/failure. Currently controlled on home meds w/ vitals reviewed and documented as above. Patient had abnormal stress this admission w/ [facility] planned 10 May. S/P TAVR - Continue Plavix/Pravastatin. Foot wounds - bilateral stable gangrenous eschars. Podiatry consulted and appreciated w/ currently recs for local wound care only.   Vascular Surgery consulted and appreciated and felt
Hospitalist Progress Note    CC: Chest heaviness    Name:  Regina Iraheta /Age/Sex: 1970  (52 y.o. male)   MRN & CSN:  [id number] & [id number] Encounter Date/Time: 2023 7:26 AM EDT   Location:  0360/0360-01 PCP: MD Linda Mendez MD         Hospital course:  52 y.o. male with pmh of O2 dependent COPD/CHF at baseline home O2 at 3L, ESRD on HD  who presents with ESRD (end stage renal disease) ([facility] [address] Pt also has PMHx sig for CAD s/p PCI multiple, afib, ESRD on HD, S/P TAVR, CHF, PAD, HLD, anemia, COPD. Admit date: 2023  Days in hospital:  Hospital Day: 9  Status:  Inpatient       24 Hour Events: pt c/o chest heaviness for past couple of days    Subjective: stress test very abnormal - pt may require cardiac cath - will defer to cardiology decision    ROS:   A comprehensive review of systems was negative except for: chest pressure, chronic pain   . Objective:    BP (!) 107/51   Pulse 66   Temp 97.9 °F (36.6 °C) (Axillary)   Resp 16   Ht 5' 9\" (1.753 m)   Wt 240 lb 9.6 oz (109.1 kg)   SpO2 100%   PF 56 L/min   BMI 35.53 kg/m²     Gen: chronically ill appearing  HEENT: NC/AT, moist mucous membranes, no oropharyngeal erythema or exudate  Neck: supple, trachea midline, no anterior cervical or SC LAD  Heart:  regular rate and rhythm, no murmurs, no gallops, no rubs.  Trace bilateral leg edema  Lungs: diminished bilaterally, no wheezing, no rhonchi  Abd: soft, non-tender, non-distended, normal bowel sounds, no masses or organomegaly  Extrem:  no ulcers, no Zach's sign, normal pulses, equal and bilateral 2+, and trace edema bilateral LE  Skin: Pale, multiple ulcers on toes and R foot, does not appear infected  Psych: A & O x3  Neuro: grossly intact, moves all 4 extremities    Assessment:    Principal Problem:    Chest heaviness  Active Problems:    CAD in native artery    PVD
Nephrology Progress Note   [email or web address]      Sub/interval history  Patient remains on 4 L of oxygen which is his baseline  HD later today    On 5/10 patient left heart catheterization and had 3 stent placed this morning; he has to be laying flat for total of 6 hours, remains on 4 L of oxygen and comfortable    HD on  5/9 with net 3.4 L UF, post 102.9 kg  HD 5/8 blood pressure soft but preweight is 108.5 kg, only 2.6 L was taken off due to relative hypotension despite using midodrine.   Post weight was 104.9 kg      ROS: No chest pain/fever/nausea/vomiting  PSFH: No visitor    Scheduled Meds:   sodium chloride flush  5-40 mL IntraVENous 2 times per day    sodium chloride flush  5-40 mL IntraVENous 2 times per day    insulin lispro  0-4 Units SubCUTAneous TID WC    insulin lispro  0-4 Units SubCUTAneous Nightly    povidone-iodine   Topical Daily    apixaban  2.5 mg Oral BID    calcium acetate  1 capsule Oral TID WC    clopidogrel  75 mg Oral Daily    DULoxetine  60 mg Oral Daily    isosorbide dinitrate  20 mg Oral TID    metoprolol succinate  100 mg Oral BID    pantoprazole  40 mg Oral QAM AC    pravastatin  40 mg Oral Daily    QUEtiapine  50 mg Oral Nightly    torsemide  100 mg Oral Daily    sodium chloride flush  5-40 mL IntraVENous 2 times per day     Continuous Infusions:   sodium chloride      sodium chloride      dextrose      sodium chloride       PRN Meds:.sodium chloride flush, sodium chloride, ondansetron, sodium chloride flush, sodium chloride, acetaminophen, ondansetron, HYDROmorphone, hydrALAZINE, fentanNYL, heparin (porcine) PF, midodrine, glucose, dextrose bolus **OR** dextrose bolus, glucagon (rDNA), dextrose, cyclobenzaprine, gabapentin, loperamide, calcium carbonate, ipratropium-albuterol, nitroGLYCERIN, sennosides-docusate sodium, sodium chloride flush, sodium chloride, ondansetron **OR** ondansetron, polyethylene glycol, acetaminophen **OR** acetaminophen    Objective/     Vitals:
Nephrology Progress Note   [email or web address]      Sub/interval history  Patient remains on 4 L of oxygen which is his baseline, weight this morning on a standing scale is back up to 109.1 kg; sodium for 122, potassium 5.5    HD 5/8 blood pressure soft but preweight is 108.5 kg, only 2.6 L was taken off due to relative hypotension despite using midodrine.   Post weight was 104.9 kg    Stress test on 5/8    ROS: No chest pain/fever/nausea/vomiting  PSFH: No visitor    Scheduled Meds:   insulin lispro  0-4 Units SubCUTAneous TID WC    insulin lispro  0-4 Units SubCUTAneous Nightly    povidone-iodine   Topical Daily    apixaban  2.5 mg Oral BID    calcium acetate  1 capsule Oral TID WC    clopidogrel  75 mg Oral Daily    DULoxetine  60 mg Oral Daily    isosorbide dinitrate  20 mg Oral TID    metoprolol succinate  100 mg Oral BID    pantoprazole  40 mg Oral QAM AC    pravastatin  40 mg Oral Daily    QUEtiapine  50 mg Oral Nightly    torsemide  100 mg Oral Daily    sodium chloride flush  5-40 mL IntraVENous 2 times per day     Continuous Infusions:   dextrose      sodium chloride       PRN Meds:.midodrine, glucose, dextrose bolus **OR** dextrose bolus, glucagon (rDNA), dextrose, cyclobenzaprine, gabapentin, loperamide, calcium carbonate, ipratropium-albuterol, nitroGLYCERIN, sennosides-docusate sodium, sodium chloride flush, sodium chloride, ondansetron **OR** ondansetron, polyethylene glycol, acetaminophen **OR** acetaminophen    Objective/     Vitals:    05/09/23 0020 05/09/23 0353 05/09/23 0515 05/09/23 0830   BP:   139/69 (!) 107/51   Pulse:   69 66   Resp:  17 18 16   Temp:   97.7 °F (36.5 °C) 97.9 °F (36.6 °C)   TempSrc:   Axillary Axillary   SpO2:   98% 100%   Weight: 240 lb 9.6 oz (109.1 kg)      Height:       PF:         24HR INTAKE/OUTPUT:    Intake/Output Summary (Last 24 hours) at 5/9/2023 1051  Last data filed at 5/8/2023 2352  Gross per 24 hour   Intake 840 ml   Output 3000 ml   Net -2160 ml       Gen:
Nephrology Progress Note   [email or web address]      Sub/interval history  Seen and examined on HD, blood pressure soft but preweight is 108.5 kg, will try 3.5 L UF with use of midodrine  Patient reports shortness of breath  Stress test planned by cardiology on 5/8    ROS: No chest pain/fever/nausea/vomiting  PSFH: No visitor    Scheduled Meds:   sodium zirconium cyclosilicate  10 g Oral TID    insulin lispro  0-4 Units SubCUTAneous TID WC    insulin lispro  0-4 Units SubCUTAneous Nightly    povidone-iodine   Topical Daily    apixaban  2.5 mg Oral BID    calcium acetate  1 capsule Oral TID WC    clopidogrel  75 mg Oral Daily    DULoxetine  60 mg Oral Daily    isosorbide dinitrate  20 mg Oral TID    metoprolol succinate  100 mg Oral BID    pantoprazole  40 mg Oral QAM AC    pravastatin  40 mg Oral Daily    QUEtiapine  50 mg Oral Nightly    torsemide  100 mg Oral Daily    sodium chloride flush  5-40 mL IntraVENous 2 times per day     Continuous Infusions:   dextrose      sodium chloride       PRN Meds:.midodrine, oxyCODONE-acetaminophen, glucose, dextrose bolus **OR** dextrose bolus, glucagon (rDNA), dextrose, cyclobenzaprine, gabapentin, loperamide, calcium carbonate, ipratropium-albuterol, nitroGLYCERIN, sennosides-docusate sodium, sodium chloride flush, sodium chloride, ondansetron **OR** ondansetron, polyethylene glycol, acetaminophen **OR** acetaminophen    Objective/     Vitals:    05/08/23 0031 05/08/23 0405 05/08/23 0630 05/08/23 0815   BP:   114/62 109/68   Pulse:   54 56   Resp: 24 17 16 16   Temp:   97.4 °F (36.3 °C) 97.3 °F (36.3 °C)   TempSrc:   Oral Oral   SpO2:   99% 98%   Weight:       Height:       PF:         24HR INTAKE/OUTPUT:    Intake/Output Summary (Last 24 hours) at 5/8/2023 1008  Last data filed at 5/7/2023 1838  Gross per 24 hour   Intake 240 ml   Output --   Net 240 ml     Gen: alert, awake  Neck: No JVD  Skin: Unremarkable  Cardiovascular:  S1, S2 without m/r/g   Respiratory:
Patient a/o x4. Refused to place hospital gown on. VSS. Medications administered. SB to bathroom. Patient refusing bed alarm. Transport has called for patient to take to CATH Lab. Consent is signed in chart.
Patient discharge completed. Discharge information included information on diagnosis including signs and symptoms, complications and when to seek medical attention. Information on new medications also provided included use for the medication, side effects and when to call the doctor. Patient verbalized understanding of all discharge information. Patient escorted out by staff with all documented belongings. Home with family. IV and tele removed.
Patient is noncompliant with strict bedrest. Patient is moving around the bed using legs to push himself. Education has been provided about the importance of remaining supine, patient understands but not following directions/education. Groin sites remain soft, no evidence of bleeding.
Patient would not let me cover him with insulin this morning. Sugar was 272 and he was supposed to receive two units. I tried to give it and patient stated \"two units is a waste I do not want it\". I educated that two units still controls sugars and is necessary and he refused.
Physical Therapy    Chart reviewed and attempted to see patient for follow up therapy treatment. Per RN on floor, patient is currently off the floor for dialysis treatment. Will plan to follow up for therapy tx at later time/date as schedule allows. Thank you.      Elvin Ramsey, PT, DPT
Physical Therapy    Pt off the floor at cath lab at time of attempt this morning. Will re-attempt as schedule allows and per POC.   Thank you,  Gwen Randle, PT, DPT
Physical Therapy  Facility/Department: [facility] C4 PCU  Daily Treatment Note/Discharge   NAME: Wendy Bautista  : 1970  MRN: [id number]    Date of Service: 2023    Discharge Recommendations:  Home with assist PRN   PT Equipment Recommendations  Equipment Needed: No    Patient Diagnosis(es): The primary encounter diagnosis was ESRD (end stage renal disease) on dialysis ([facility] [address] Diagnoses of COPD exacerbation ([facility] [address] and Pulmonary nodule were also pertinent to this visit. Assessment   Assessment: Pt with fair participation, requires max encouragment to participate. Able to complete bed mobility transfers, and ambulate with grossly supervision to mod I. All goals met this date. Pt presents at baseline function and no further pt needs at this time. Safe to DC home with assist PRN when deemed medically appropraite. Activity Tolerance: Patient tolerated treatment well  Equipment Needed: No     Plan    Physcial Therapy Plan  General Plan: Discharge     Restrictions  Restrictions/Precautions  Restrictions/Precautions: General Precautions, Fall Risk, Up as Tolerated  Position Activity Restriction  Other position/activity restrictions: 4LO2, WBAT (per  Podiatry note)     Subjective    Subjective  Subjective: Pt agreeable to work with PT this morning with max encouragment  Pain: Pt reporting 8.5/10 generalized pain in back and hips, RN notified  Orientation  Overall Orientation Status: Within Functional Limits     Objective   Vitals  Pulse: 57  BP: 114/72  BP Location: Right upper arm  MAP (Calculated): 86  SpO2: 100 %  O2 Device: Nasal cannula (4L)  Bed Mobility Training  Bed Mobility Training: Yes  Supine to Sit: Modified independent; Adaptive equipment (HOB elevated)  Sit to Supine: Modified independent; Adaptive equipment (HOB elevated)  Scooting: Modified independent  Balance  Sitting: Intact  Standing: With support (With RW)  Transfer Training  Sit to Stand: Modified independent; Adaptive equipment (With
Podiatric Surgery Daily Progress Note  Barak Cyr  Subjective :   Patient seen and examined during HD, very lethargic. Objective     BP (!) 117/59   Pulse 58   Temp 97.3 °F (36.3 °C)   Resp 20   Ht 5' 9\" (1.753 m)   Wt 228 lb 2.8 oz (103.5 kg)   SpO2 98%   PF 56 L/min   BMI 33.70 kg/m²      I/O:  Intake/Output Summary (Last 24 hours) at 5/8/2023 1129  Last data filed at 5/7/2023 1838  Gross per 24 hour   Intake 240 ml   Output --   Net 240 ml              Wt Readings from Last 3 Encounters:   05/08/23 228 lb 2.8 oz (103.5 kg)   04/22/23 224 lb 6.9 oz (101.8 kg)   03/16/23 220 lb 14.4 oz (100.2 kg)       LABS:    Recent Labs     05/06/23  1030   WBC 8.7   HGB 10.5*   HCT 31.7*           Recent Labs     05/06/23  1030 05/07/23  0708 05/08/23  0945   *   < > 123*   K 5.3*   < > 5.5*   CL 82*   < > 83*   CO2 20*   < > 23   PHOS 5.5*  --   --    BUN 58*   < > 93*   CREATININE 6.3*   < > 8.9*    < > = values in this interval not displayed. No results for input(s): PROT, INR, APTT in the last 72 hours. LOWER EXTREMITY EXAMINATION    Integument:  Skin is warm, dry and supple, bilateral.  Small, stable ulcer with eschar cap to the dorsal left 2nd toe with no SOI, area measures approximately 0.4 cm x 0.4 cm x 0.1 cm deep. No probe to bone. Ulceration also noted to the plantar lateral right 5th MPJ with stable eschar cap, ulcer measures approximately 3 cm x 3 cm x 0.1 cm deep. No probe to bone, no drainage. No erythema. No acute signs of infection              Neurologic:  Protective sensation is grossly diminished to light touch at the level of the toes, bilateral.  Vascular:  DP and PT pulses are nonpalpable, bilateral. No significant edema appreciated. Musculoskeletal:  Patient has 5/5 strength on inversion/everison/dorsiflexion/plantarflexion, bilateral.  No gross musculoskeletal deformities noted.       Imaging: R and L foot xrays reviewed: no cortical erosions noted to either
Podiatric Surgery Daily Progress Note  Shani French  Subjective :   Patient seen and examined bedside s/p cardiac procedure. Laying flat in bed, lethargic. Objective     BP (!) 164/79   Pulse (!) 105   Temp 97.4 °F (36.3 °C) (Oral)   Resp 18   Ht 5' 9\" (1.753 m)   Wt 233 lb 14.5 oz (106.1 kg)   SpO2 94%   PF 56 L/min   BMI 34.54 kg/m²      I/O:  Intake/Output Summary (Last 24 hours) at 5/10/2023 1904  Last data filed at 5/10/2023 0813  Gross per 24 hour   Intake 210 ml   Output --   Net 210 ml              Wt Readings from Last 3 Encounters:   05/09/23 233 lb 14.5 oz (106.1 kg)   04/22/23 224 lb 6.9 oz (101.8 kg)   03/16/23 220 lb 14.4 oz (100.2 kg)       LABS:    No results for input(s): WBC, HGB, HCT, PLT in the last 72 hours. Recent Labs     05/10/23  0715   *   K 4.8   CL 85*   CO2 23   PHOS 5.4*   BUN 48*   CREATININE 5.7*      No results for input(s): PROT, INR, APTT in the last 72 hours. LOWER EXTREMITY EXAMINATION    Integument:  Skin is warm, dry and supple, bilateral.  Small, stable ulcer with eschar cap to the dorsal left 2nd toe with no SOI, area measures approximately 0.4 cm x 0.4 cm x 0.1 cm deep. No probe to bone. Ulceration also noted to the plantar lateral right 5th MPJ with stable eschar cap, ulcer measures approximately 3 cm x 3 cm x 0.1 cm deep. No probe to bone, no drainage. No erythema. No acute signs of infection            Neurologic:  Protective sensation is grossly diminished to light touch at the level of the toes, bilateral.  Vascular:  DP and PT pulses are nonpalpable, bilateral. No significant edema appreciated. Musculoskeletal:  Patient has 5/5 strength on inversion/everison/dorsiflexion/plantarflexion, bilateral.  No gross musculoskeletal deformities noted.       Imaging: R and L foot xrays reviewed: no cortical erosions noted to either ulcer location   - LE arterial duplex: R DESTINEY 0.3, L DESTINEY 0.7, b/l inflow and infrapopliteal disease
Pt is off the bipap and back on a 4 lpm nc.
Pt. Refused 12mn vital signs.
Pt. Refused bed alarm even after explaining the importance of it.
Pt. Refused daily weight and change dressing of vas cath
Shaylee 79   Daily Progress Note    Admit Date:  5/1/2023  HPI:    Chief Complaint   Patient presents with    Shortness of Breath     Pt state that he stated with increase wob this am pt is normally on 4 lit of oxygen at home and is currently 100% on 3 lit of oxygen      Bee Six presented with chest pain and shortness of breath. EKG abnormal with inferior lateral changes. Stress test abnormal.  [facility] 5/10/23 with PCI to RI, LCx and RCA. Hx of CAD s/p PCI to LAD in 2015, RCA 1/2022, AS s/p TAVR 2019, ICM, s/d CHF, A. Fib (Eliquis), HYPERTENSION, HLD, DM, PAD s/p PTA R iliac artery, ESRD on HD, chronic anemia, ZAINAB on CPAP, COPD, and hx of CVA. Subjective:  Mr. Glez Cleverly seen sitting up on side of bed eating breakfast.  Denies any further chest pain. Reports his breathing is improved. No complications from procedure. To have dialysis today. Plans on discharging to home.        Objective:   Patient Vitals for the past 24 hrs:   BP Temp Temp src Pulse Resp SpO2 Weight   05/11/23 1124 -- 97.4 °F (36.3 °C) Oral -- -- -- --   05/11/23 1110 114/72 -- -- 57 -- 100 % --   05/11/23 1014 115/74 -- -- -- -- -- --   05/11/23 0505 -- -- -- -- -- -- 255 lb 11.7 oz (116 kg)   05/11/23 0500 115/74 97.7 °F (36.5 °C) Oral 65 18 98 % --   05/11/23 0451 -- -- -- -- -- 97 % --   05/11/23 0140 -- -- -- -- 18 -- --   05/11/23 0042 130/71 97.7 °F (36.5 °C) Oral 63 -- 96 % --   05/11/23 0036 -- -- -- -- 18 -- --   05/10/23 2231 -- -- -- -- 15 -- --   05/10/23 2206 (!) 170/75 97.6 °F (36.4 °C) Oral -- -- -- --   05/10/23 2125 -- -- -- 65 -- -- --   05/10/23 2004 (!) 167/83 -- -- -- -- -- --   05/10/23 1856 -- -- -- -- 18 -- --   05/10/23 1804 (!) 164/79 -- -- (!) 105 -- -- --   05/10/23 1704 (!) 178/88 -- -- 67 -- -- --   05/10/23 1647 (!) 171/101 -- -- 67 -- 94 % --   05/10/23 1628 -- -- -- -- 20 -- --   05/10/23 1558 -- -- -- -- 20 -- --   05/10/23 1550 (!) 201/98 97.4 °F (36.3 °C) Oral 68 20 100 % --
[facility] Wound Ostomy Continence Nurse  Follow-up Progress Note       NAME:  Asha Rodriguez  MEDICAL RECORD NUMBER:  [id number]  AGE:  52 y.o. GENDER:  male  :  1970  TODAY'S DATE:  2023    Subjective: Junita Knife hit it last Friday on the bed and table. Wound Identification:  Wound Type: diabetic  Contributing Factors: diabetes and poor glucose control     Recheck related to [facility] 2023 left lateral foot hit bed. Wound History: Patient has been seen by this wound care nurse 2023 for DM toes. At that time treated open areas with Alginate Ag. Seen 2022 for DM feet, treated with Alginate ag. Current Wound Care Treatment:  Open to air. Patient Goal of Care:  [x] Wound Healing  [] Odor Control  [] Palliative Care  [x] Pain Control   [] Other:        Objective:Lying in bed with C-Pap on    BP (!) 107/51   Pulse 66   Temp 97.9 °F (36.6 °C) (Axillary)   Resp 16   Ht 5' 9\" (1.753 m)   Wt 240 lb 9.6 oz (109.1 kg)   SpO2 100%   PF 56 L/min   BMI 35.53 kg/m²   Isaac Risk Score: Isaac Scale Score: 19  Assessment:left lateral foot dry, intact. Right foot 2nd & 3rd toes diabetic ulcers dry, intact.    Measurements:  Wound 23 Foot Right;Lateral dry eschar  painful (Active)   Wound Image   23   Wound Etiology Pressure Stage 3 23   Dressing Status New dressing applied 23   Wound Cleansed Cleansed with saline 23   Dressing/Treatment Betadine swabs/povidone iodine 23   Offloading for Diabetic Foot Ulcers Offloading ordered 23   Dressing Change Due 05/10/23 05/09/23 0932   Wound Length (cm) 4 cm 23   Wound Width (cm) 4 cm 23   Wound Depth (cm) 0 cm 23   Wound Surface Area (cm^2) 16 cm^2 23   Change in Wound Size % (l*w) 27.27 23 0932   Wound Volume (cm^3) 0 cm^3 2332   Wound Healing % 100 23   Distance Tunneling
Vascular    Laser skin perfusion results reviewed. Perfusion is actually good at all level except right medial plantar but there is no wound there. Suggest conservative treatment, that is no invasive angiogram or arterial intervention. Patient can follow up with me as outpatient if continued healing problems after appropriate wound care.
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 *      Plan:   Chest heaviness - for stress test tomorrow  Hyperkalemia - improved - had been started on lokelma TID  Acute on chronic systolic CHF - tried for fluid challenge today  Acute resp failure with hypoxia  PVD - has ischemic, non-infected areas on both feet - will have skin perfusion study today  Hyponatremia - slightly improved today - on fluid restriction    Prognosis:  Fair    Code status:  Full code    DVT prophylaxis: NOAC/Warfarin  GI prophylaxis: Proton Pump Inhibitor  Antibiotic prophylaxis indicated:  No     Diet:  ADULT DIET; Regular; 4 carb choices (60 gm/meal); Low Potassium (Less than 3000 mg/day); Low Phosphorus (Less than 1000 mg); 1000 ml  ADULT ORAL NUTRITION SUPPLEMENT; Breakfast; Renal Oral Supplement    Disposition:  Home with home health in 2 day(s). Patient requires continued admission due to chest heaviness. Discussed with patient, nursing.   Appropriate for A1 discharge unit:  No    Medications:  Scheduled Meds:   insulin lispro  0-4 Units SubCUTAneous TID WC    insulin lispro  0-4 Units SubCUTAneous Nightly    povidone-iodine   Topical Daily    apixaban  2.5 mg Oral BID    calcium acetate  1 capsule Oral TID WC    clopidogrel  75 mg Oral Daily    DULoxetine  60 mg Oral Daily    isosorbide dinitrate  20 mg Oral TID    metoprolol succinate  100 mg Oral BID    pantoprazole  40 mg Oral QAM AC    pravastatin  40 mg Oral Daily    QUEtiapine  50 mg Oral Nightly    torsemide  100 mg Oral Daily    sodium chloride flush  5-40 mL IntraVENous 2 times per day       PRN Meds:  midodrine, oxyCODONE-acetaminophen, glucose, dextrose bolus **OR** dextrose bolus, glucagon (rDNA), dextrose, cyclobenzaprine, gabapentin,
[address] [facility]
We monitored the patient's level of consciousness and vital signs/physiologic status throughout the procedure duration (see times listed previously). 205 cc contrast was utilized. <20cc EBL. Case/findings/plans discussed with patient's wife, she understood/agreed. FINDINGS      HEMODYNAMICS     CHAMBER PRESSURE SATURATION   RA  10 60%   RV  47/9     PA  50/20  67%   PW  19     AORTA  101/50  95%      NANCY CARDIAC OUTPUT  5.8 L/min   SVR  756    PVR  234             LVGRAM     LVEDP  21   GRADIENT ACROSS AORTIC VALVE  less than 5 mmHg   LV FUNCTION EF 20%   WALL MOTION  severe global hypokinesis with regional variation   MITRAL REGURGITATION  mild         CORONARY ARTERIES     LM Less than 10% wygscxha-qsg-krflcw stenosis            LAD Moderately calcified, 20 to 30% proximal-mid stenosis, distal vessel tapers at the apex with 60% diffuse disease. D1 has an ostial/proximal 75 to 80% stenosis. LCX  Calcified, proximal-mid 75 to 80% stenosis. Distal vessel is tortuous with 70 to 85% diffuse stenosis with more discrete stenosis noted distally. OM 1 is a very small vessel with ostial/proximal 80% stenosis and 60 to 70% diffuse disease in the jlkiyokw-get-uhjpdf vessel. Shanique Adan RCA Dominant, calcified, tortuous, there is a proximal-mid stent with 60 to 70% in-stent restenosis. Distal vessel 20 to 30% stenosis         CONCLUSIONS:      Mild to moderately increased filling pressures  Patent RCA stent with moderate to borderline severe in-stent restenosis  Severe circumflex and D1 stenosis  Continue medical management, consider outpatient high risk PCI of above territories pending outpatient clinical follow-up. Currently medical management seems best given comorbidities and need for additional fluid removal.  If PCI is pursued, will likely need general anesthesia. Patient had difficulty lying still on Cath Lab table.      Okay to resume Eliquis tomorrow, case/plan/findings discussed with

## 2023-05-11 NOTE — CARE COORDINATION
CASE MANAGEMENT DISCHARGE SUMMARY      Discharge to: home with TEJA through [place] [id number]. Home health and medical house calls. Precertification completed: n/a      Transportation: private vehicle       Confirmed discharge plan with: nursing and MD     Patient: yes       Facility/Agency, name:  [facility] pulled from [facility] per agency.

## 2023-05-11 NOTE — FLOWSHEET NOTE
Treatment time: 3 .3 hours  Net UF: 3200 ml    Pre weight: 110.2 kg ( standing scale)  Post weight: 107 kg  EDW: TBD kg    Access used: Left chest wall TDC  Access function: Good with  ml/min    Medications or blood products given: no    Regular outpatient schedule: KEVIN    Summary of response to treatment: Pt tolerated ok with HD, reduced UF goal and keep his sBP>100s during HD. Pt asked to terminated HD early, encouraged him to completed full HD, but he insisted to terminate 10 minutes early, HD terminated 10 minutes early. Heparin dwell in TDC, capped and clamped. Cirt Line: Initial Hct: 30.9;   End Profile : A; Refill unable to perform; BV: -8.9 %      Copy of dialysis treatment record placed in chart, to be scanned into EMR.    05/11/23 1255 05/11/23 1625   Vital Signs   /60 117/63   Temp 97.4 °F (36.3 °C) 97.4 °F (36.3 °C)   Respirations 18 18   Weight - Scale 242 lb 15.2 oz (110.2 kg) 235 lb 14.3 oz (107 kg)   Weight Method Standing scale Standing scale   Percent Weight Change -5 -2.9   Treatment   Time On 1259  --    Treatment Goal 3500  --    Technical Checks   All Connections Secure Yes  --    NS Bag Yes  --    Saline Line Double Clamped Yes  --    Dialyzer F-160  --    RO Machine Log Sheet Completed Yes  --    Machine Alarm Self Test Completed; Passed  --    Air Foam Detector Tested;Proper Function;pH Reading  --    Extracorporeal Circuit Tested for Integrity Yes  --    Machine Conductivity 13.2  --    Manual Conductivity 13.2  --    Machine Ph 7.2  --

## 2023-05-11 NOTE — DISCHARGE SUMMARY
Hospital Medicine Discharge Summary    Patient: Jennifer Gutierrez   : 1970     Admit Date: 2023   Discharge Date: 2023    Disposition:  [x]Home   [x]HHC  []SNF  []Acute Rehab  []LTAC  []Hospice  Code status:  []Full  []DNR/CCA  [x]Limited (DNR/CCA with Do Not Intubate)  []DNRCC  Condition at Discharge: Stable  Primary Care Provider: Aldo Moon MD    Admitting Physician: Josefina Leung MD  Discharge Physician: Josefina Leung MD     Discharge Diagnoses: Active Hospital Problems    Diagnosis     Acute on chronic systolic HF (heart failure) ([facility]) [I50.23]      Priority: High    Type 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 hypertension and ESRD on dialysis ([facility] [address] [E11.22, I12.0, Z99.2, N18.6]      Priority: High    PVD (peripheral vascular disease) ([facility] [address] [I73.9]      Priority: High    CAD in native artery [I25.10]      Priority: High    S/P TAVR (transcatheter aortic valve replacement) [Z95.2]      Priority: Medium    Abnormal cardiovascular stress test [R94.39]     Chest heaviness [R07.89]     ESRD (end stage renal disease) on dialysis ([facility] [address] [N18.6, Z99.2]        Presenting Admission History:      \"52 y.o. obese male w/ hx O2 dependent COPD/CHF at baseline home O2 at 3L who presented to [facility] with a 1 day hx of acute onset progressive SOB/CASH. He is on HD M/W/F w/ Dr. Nazario Harley and had a full run on  but was unable to go to HD 1 May due to sxs. The patient denies any fever/chills or other signs/sxs of systemic illness or identifiable aggravating/alleviating factors. \"     Assessment/Plan:         ESRD - regular HD M/W/F. Nephrology consulted and appreciated      CHF - acute on chronic combined sytolic failure w/ reduced/preserved EF 25-30% by Echo 2023. Likely due to hypertensive heart disease. Continue current medical management and follow I/O as well as clinical response.        Acute Respiratory Failure - w/ hypoxia, POArrival.  Presence of clinical respiratory distress w/

## 2023-05-11 NOTE — CARE COORDINATION
Case Management Follow up      Chart reviewed and case discussed during huddle and rounds. Pt is admitted day # 10. Unit C-4. Diagnosis and currently status as per MD progress: ESRD- on OP HD [facility]. [facility] for dialysis now. CHF- continue medical management. Services following:IM, Cardiology, Nephrology    Anticipated Discharge date:today or tomorrow    Expected Plan for Discharge:home with [facility] through [facility] and medical house calls. OP HD [facility] MWF. Home with assist prn recs. Pre cert needed: n/a    Potential Barriers:none    RN CM will continue to follow Pt clinical course for DCP needs.

## 2023-05-11 NOTE — DISCHARGE INSTR - COC
UHMJ:[id number]}    Treatments at the Time of Hospital Discharge:   Respiratory Treatments: ***  Oxygen Therapy:  {Therapy; copd oxygen:37862}  Ventilator:    {MH CC Vent XEMZ:895862324}    Rehab Therapies: {THERAPEUTIC INTERVENTION:6051775452}  Weight Bearing Status/Restrictions: [address] CC Weight Bearin}  Other Medical Equipment (for information only, NOT a DME order):  {EQUIPMENT:903815146}  Other Treatments: ***    Patient's personal belongings (please select all that are sent with patient):  {CHP DME Belongings:073285760}    RN SIGNATURE:  {Esignature:933546180}    CASE MANAGEMENT/SOCIAL WORK SECTION    Inpatient Status Date: 23    Readmission Risk Assessment Score:  Readmission Risk              Risk of Unplanned Readmission:  84           Discharging to Facility/ Agency   Name: [facility]   Phone: [phone]  Fax: [phone]    Dialysis Facility (if applicable)   [facility]   Dialysis Schedule: [facility]  Phone: [phone]    / signature: Electronically signed by Yolie Moreno RN on 23 at 2:19 PM EDT    PHYSICIAN SECTION    Prognosis: Good    Condition at Discharge: Stable    Rehab Potential (if transferring to Rehab): Good    Recommended Labs or Other Treatments After Discharge: None    Physician Certification: I certify the above information and transfer of Rodrigo Senior  is necessary for the continuing treatment of the diagnosis listed and that he requires Home Care for less 30 days.      Update Admission H&P: No change in H&P    PHYSICIAN SIGNATURE:  Electronically signed by JOSEFINA Duarte, RN on 23 at 2:20 PM EDT

## 2023-05-12 ENCOUNTER — CARE COORDINATION (OUTPATIENT)
Dept: CASE MANAGEMENT | Age: 53
End: 2023-05-12

## 2023-05-12 NOTE — CARE COORDINATION
appointment was previously scheduled, appointment scheduling offered: Yes. Is follow up appointment scheduled within 7 days of discharge? No.    Follow Up  Future Appointments   Date Time Provider Nelly Darby   6/6/2023 10:30 AM JAY Fleming CNP [facility]       Care Transition Nurse provided contact information. No further follow-up call indicated based on severity of symptoms and risk factors.     Jose NELSONN, RN, [facility]  Care Transition Nurse  [phone] mobile

## 2023-05-15 ENCOUNTER — TELEPHONE (OUTPATIENT)
Dept: CASE MANAGEMENT | Age: 53
End: 2023-05-15

## 2023-05-15 RX ORDER — CYCLOBENZAPRINE HCL 10 MG
TABLET ORAL
Qty: 90 TABLET | Refills: 10 | OUTPATIENT
Start: 2023-05-15

## 2023-05-15 NOTE — TELEPHONE ENCOUNTER
Imaging report CT Chest 5/1/23 with f/u imaging recommendations sent to [facility]  Lung Navigation ARIS([email or web address]

## 2023-05-18 LAB — POC ACT LR: 316 SEC

## 2023-05-22 ENCOUNTER — APPOINTMENT (OUTPATIENT)
Dept: GENERAL RADIOLOGY | Age: 53
End: 2023-05-22
Payer: MEDICARE

## 2023-05-22 ENCOUNTER — HOSPITAL ENCOUNTER (INPATIENT)
Age: 53
LOS: 18 days | Discharge: NURSING, REHAB OR LONG-TERM CARE | End: 2023-06-09
Attending: STUDENT IN AN ORGANIZED HEALTH CARE EDUCATION/TRAINING PROGRAM | Admitting: STUDENT IN AN ORGANIZED HEALTH CARE EDUCATION/TRAINING PROGRAM
Payer: MEDICARE

## 2023-05-22 DIAGNOSIS — L08.9 WOUND INFECTION: ICD-10-CM

## 2023-05-22 DIAGNOSIS — Z99.81 SUPPLEMENTAL OXYGEN DEPENDENT: ICD-10-CM

## 2023-05-22 DIAGNOSIS — L97.529 DIABETIC ULCER OF TOE OF LEFT FOOT ASSOCIATED WITH TYPE 2 DIABETES MELLITUS, UNSPECIFIED ULCER STAGE (HCC): ICD-10-CM

## 2023-05-22 DIAGNOSIS — T14.8XXA WOUND INFECTION: ICD-10-CM

## 2023-05-22 DIAGNOSIS — I96 GANGRENE OF RIGHT FOOT (HCC): Primary | ICD-10-CM

## 2023-05-22 DIAGNOSIS — E11.621 DIABETIC ULCER OF TOE OF LEFT FOOT ASSOCIATED WITH TYPE 2 DIABETES MELLITUS, UNSPECIFIED ULCER STAGE (HCC): ICD-10-CM

## 2023-05-22 DIAGNOSIS — I73.9 PERIPHERAL VASCULAR DISEASE, UNSPECIFIED (HCC): ICD-10-CM

## 2023-05-22 PROBLEM — L98.499: Status: ACTIVE | Noted: 2023-05-22

## 2023-05-22 LAB
ALBUMIN SERPL-MCNC: 4.1 G/DL (ref 3.4–5)
ALBUMIN/GLOB SERPL: 1 {RATIO} (ref 1.1–2.2)
ALP SERPL-CCNC: 79 U/L (ref 40–129)
ALT SERPL-CCNC: 10 U/L (ref 10–40)
ANION GAP SERPL CALCULATED.3IONS-SCNC: 17 MMOL/L (ref 3–16)
AST SERPL-CCNC: 9 U/L (ref 15–37)
BASOPHILS # BLD: 0.1 K/UL (ref 0–0.2)
BASOPHILS NFR BLD: 0.7 %
BILIRUB SERPL-MCNC: <0.2 MG/DL (ref 0–1)
BUN SERPL-MCNC: 44 MG/DL (ref 7–20)
CALCIUM SERPL-MCNC: 9.5 MG/DL (ref 8.3–10.6)
CHLORIDE SERPL-SCNC: 85 MMOL/L (ref 99–110)
CO2 SERPL-SCNC: 27 MMOL/L (ref 21–32)
CREAT SERPL-MCNC: 5 MG/DL (ref 0.9–1.3)
CRP SERPL-MCNC: 84.4 MG/L (ref 0–5.1)
DEPRECATED RDW RBC AUTO: 17.1 % (ref 12.4–15.4)
EOSINOPHIL # BLD: 0.2 K/UL (ref 0–0.6)
EOSINOPHIL NFR BLD: 2 %
ERYTHROCYTE [SEDIMENTATION RATE] IN BLOOD BY WESTERGREN METHOD: 59 MM/HR (ref 0–20)
GFR SERPLBLD CREATININE-BSD FMLA CKD-EPI: 13 ML/MIN/{1.73_M2}
GLUCOSE BLD-MCNC: 313 MG/DL (ref 70–99)
GLUCOSE SERPL-MCNC: 336 MG/DL (ref 70–99)
HCT VFR BLD AUTO: 32.5 % (ref 40.5–52.5)
HGB BLD-MCNC: 10.5 G/DL (ref 13.5–17.5)
INR PPP: 1.02 (ref 0.84–1.16)
LACTATE BLDV-SCNC: 1.1 MMOL/L (ref 0.4–1.9)
LACTATE BLDV-SCNC: 1.8 MMOL/L (ref 0.4–1.9)
LYMPHOCYTES # BLD: 0.5 K/UL (ref 1–5.1)
LYMPHOCYTES NFR BLD: 4.7 %
MCH RBC QN AUTO: 28.9 PG (ref 26–34)
MCHC RBC AUTO-ENTMCNC: 32.4 G/DL (ref 31–36)
MCV RBC AUTO: 89.2 FL (ref 80–100)
MONOCYTES # BLD: 0.7 K/UL (ref 0–1.3)
MONOCYTES NFR BLD: 6.8 %
NEUTROPHILS # BLD: 9.4 K/UL (ref 1.7–7.7)
NEUTROPHILS NFR BLD: 85.8 %
PERFORMED ON: ABNORMAL
PLATELET # BLD AUTO: 312 K/UL (ref 135–450)
PMV BLD AUTO: 7.4 FL (ref 5–10.5)
POTASSIUM SERPL-SCNC: 4.5 MMOL/L (ref 3.5–5.1)
PROCALCITONIN SERPL IA-MCNC: 0.33 NG/ML (ref 0–0.15)
PROT SERPL-MCNC: 8.1 G/DL (ref 6.4–8.2)
PROTHROMBIN TIME: 13.4 SEC (ref 11.5–14.8)
RBC # BLD AUTO: 3.64 M/UL (ref 4.2–5.9)
SODIUM SERPL-SCNC: 129 MMOL/L (ref 136–145)
WBC # BLD AUTO: 10.9 K/UL (ref 4–11)

## 2023-05-22 PROCEDURE — 73620 X-RAY EXAM OF FOOT: CPT

## 2023-05-22 PROCEDURE — 71045 X-RAY EXAM CHEST 1 VIEW: CPT

## 2023-05-22 PROCEDURE — 80053 COMPREHEN METABOLIC PANEL: CPT

## 2023-05-22 PROCEDURE — 2700000000 HC OXYGEN THERAPY PER DAY

## 2023-05-22 PROCEDURE — 94761 N-INVAS EAR/PLS OXIMETRY MLT: CPT

## 2023-05-22 PROCEDURE — 85652 RBC SED RATE AUTOMATED: CPT

## 2023-05-22 PROCEDURE — 85610 PROTHROMBIN TIME: CPT

## 2023-05-22 PROCEDURE — 82306 VITAMIN D 25 HYDROXY: CPT

## 2023-05-22 PROCEDURE — 6370000000 HC RX 637 (ALT 250 FOR IP): Performed by: STUDENT IN AN ORGANIZED HEALTH CARE EDUCATION/TRAINING PROGRAM

## 2023-05-22 PROCEDURE — 73660 X-RAY EXAM OF TOE(S): CPT

## 2023-05-22 PROCEDURE — 83036 HEMOGLOBIN GLYCOSYLATED A1C: CPT

## 2023-05-22 PROCEDURE — 1200000000 HC SEMI PRIVATE

## 2023-05-22 PROCEDURE — 86140 C-REACTIVE PROTEIN: CPT

## 2023-05-22 PROCEDURE — 85025 COMPLETE CBC W/AUTO DIFF WBC: CPT

## 2023-05-22 PROCEDURE — 96374 THER/PROPH/DIAG INJ IV PUSH: CPT

## 2023-05-22 PROCEDURE — 2500000003 HC RX 250 WO HCPCS: Performed by: PHYSICIAN ASSISTANT

## 2023-05-22 PROCEDURE — 2580000003 HC RX 258: Performed by: STUDENT IN AN ORGANIZED HEALTH CARE EDUCATION/TRAINING PROGRAM

## 2023-05-22 PROCEDURE — 6360000002 HC RX W HCPCS: Performed by: STUDENT IN AN ORGANIZED HEALTH CARE EDUCATION/TRAINING PROGRAM

## 2023-05-22 PROCEDURE — 36415 COLL VENOUS BLD VENIPUNCTURE: CPT

## 2023-05-22 PROCEDURE — 83605 ASSAY OF LACTIC ACID: CPT

## 2023-05-22 PROCEDURE — 6370000000 HC RX 637 (ALT 250 FOR IP): Performed by: PHYSICIAN ASSISTANT

## 2023-05-22 PROCEDURE — 99285 EMERGENCY DEPT VISIT HI MDM: CPT

## 2023-05-22 PROCEDURE — 2580000003 HC RX 258: Performed by: PHYSICIAN ASSISTANT

## 2023-05-22 PROCEDURE — 6360000002 HC RX W HCPCS: Performed by: PHYSICIAN ASSISTANT

## 2023-05-22 PROCEDURE — 84145 PROCALCITONIN (PCT): CPT

## 2023-05-22 PROCEDURE — 87040 BLOOD CULTURE FOR BACTERIA: CPT

## 2023-05-22 RX ORDER — MIDODRINE HYDROCHLORIDE 5 MG/1
10 TABLET ORAL DAILY PRN
Status: DISCONTINUED | OUTPATIENT
Start: 2023-05-22 | End: 2023-06-09 | Stop reason: HOSPADM

## 2023-05-22 RX ORDER — PANTOPRAZOLE SODIUM 40 MG/1
40 TABLET, DELAYED RELEASE ORAL
Status: DISCONTINUED | OUTPATIENT
Start: 2023-05-23 | End: 2023-06-09 | Stop reason: HOSPADM

## 2023-05-22 RX ORDER — SODIUM CHLORIDE 0.9 % (FLUSH) 0.9 %
5-40 SYRINGE (ML) INJECTION EVERY 12 HOURS SCHEDULED
Status: DISCONTINUED | OUTPATIENT
Start: 2023-05-22 | End: 2023-06-09 | Stop reason: HOSPADM

## 2023-05-22 RX ORDER — ONDANSETRON 2 MG/ML
4 INJECTION INTRAMUSCULAR; INTRAVENOUS EVERY 6 HOURS PRN
Status: DISCONTINUED | OUTPATIENT
Start: 2023-05-22 | End: 2023-05-24

## 2023-05-22 RX ORDER — HYDROMORPHONE HYDROCHLORIDE 1 MG/ML
1 INJECTION, SOLUTION INTRAMUSCULAR; INTRAVENOUS; SUBCUTANEOUS ONCE
Status: COMPLETED | OUTPATIENT
Start: 2023-05-22 | End: 2023-05-22

## 2023-05-22 RX ORDER — DULOXETIN HYDROCHLORIDE 60 MG/1
60 CAPSULE, DELAYED RELEASE ORAL DAILY
Status: DISCONTINUED | OUTPATIENT
Start: 2023-05-23 | End: 2023-06-09 | Stop reason: HOSPADM

## 2023-05-22 RX ORDER — ISOSORBIDE DINITRATE 10 MG/1
20 TABLET ORAL 3 TIMES DAILY
Status: DISCONTINUED | OUTPATIENT
Start: 2023-05-22 | End: 2023-06-09 | Stop reason: HOSPADM

## 2023-05-22 RX ORDER — HEPARIN SODIUM 5000 [USP'U]/ML
5000 INJECTION, SOLUTION INTRAVENOUS; SUBCUTANEOUS EVERY 8 HOURS SCHEDULED
Status: DISCONTINUED | OUTPATIENT
Start: 2023-05-23 | End: 2023-05-30 | Stop reason: ALTCHOICE

## 2023-05-22 RX ORDER — ACETAMINOPHEN 325 MG/1
650 TABLET ORAL EVERY 6 HOURS PRN
Status: DISCONTINUED | OUTPATIENT
Start: 2023-05-22 | End: 2023-06-09 | Stop reason: HOSPADM

## 2023-05-22 RX ORDER — INSULIN LISPRO 100 [IU]/ML
0-8 INJECTION, SOLUTION INTRAVENOUS; SUBCUTANEOUS
Status: DISCONTINUED | OUTPATIENT
Start: 2023-05-23 | End: 2023-06-09 | Stop reason: HOSPADM

## 2023-05-22 RX ORDER — HYDROMORPHONE HYDROCHLORIDE 1 MG/ML
1 INJECTION, SOLUTION INTRAMUSCULAR; INTRAVENOUS; SUBCUTANEOUS ONCE
Status: DISCONTINUED | OUTPATIENT
Start: 2023-05-22 | End: 2023-05-22

## 2023-05-22 RX ORDER — TRAZODONE HYDROCHLORIDE 50 MG/1
50 TABLET ORAL DAILY
Status: DISCONTINUED | OUTPATIENT
Start: 2023-05-22 | End: 2023-06-09 | Stop reason: HOSPADM

## 2023-05-22 RX ORDER — ONDANSETRON 4 MG/1
4 TABLET, ORALLY DISINTEGRATING ORAL EVERY 8 HOURS PRN
Status: DISCONTINUED | OUTPATIENT
Start: 2023-05-22 | End: 2023-05-24

## 2023-05-22 RX ORDER — GABAPENTIN 100 MG/1
100 CAPSULE ORAL 3 TIMES DAILY
Status: DISCONTINUED | OUTPATIENT
Start: 2023-05-22 | End: 2023-06-09 | Stop reason: HOSPADM

## 2023-05-22 RX ORDER — DEXTROSE MONOHYDRATE 100 MG/ML
INJECTION, SOLUTION INTRAVENOUS CONTINUOUS PRN
Status: DISCONTINUED | OUTPATIENT
Start: 2023-05-22 | End: 2023-06-09 | Stop reason: HOSPADM

## 2023-05-22 RX ORDER — TORSEMIDE 100 MG/1
100 TABLET ORAL DAILY
Status: DISCONTINUED | OUTPATIENT
Start: 2023-05-23 | End: 2023-06-09 | Stop reason: HOSPADM

## 2023-05-22 RX ORDER — ACETAMINOPHEN 650 MG/1
650 SUPPOSITORY RECTAL EVERY 6 HOURS PRN
Status: DISCONTINUED | OUTPATIENT
Start: 2023-05-22 | End: 2023-06-09 | Stop reason: HOSPADM

## 2023-05-22 RX ORDER — POLYETHYLENE GLYCOL 3350 17 G/17G
17 POWDER, FOR SOLUTION ORAL DAILY PRN
Status: DISCONTINUED | OUTPATIENT
Start: 2023-05-22 | End: 2023-06-09 | Stop reason: HOSPADM

## 2023-05-22 RX ORDER — SODIUM CHLORIDE 0.9 % (FLUSH) 0.9 %
5-40 SYRINGE (ML) INJECTION PRN
Status: DISCONTINUED | OUTPATIENT
Start: 2023-05-22 | End: 2023-06-09 | Stop reason: HOSPADM

## 2023-05-22 RX ORDER — METOPROLOL SUCCINATE 50 MG/1
100 TABLET, EXTENDED RELEASE ORAL 2 TIMES DAILY
Status: DISCONTINUED | OUTPATIENT
Start: 2023-05-22 | End: 2023-05-30

## 2023-05-22 RX ORDER — CLOPIDOGREL BISULFATE 75 MG/1
75 TABLET ORAL DAILY
Status: DISCONTINUED | OUTPATIENT
Start: 2023-05-23 | End: 2023-06-09 | Stop reason: HOSPADM

## 2023-05-22 RX ORDER — SODIUM CHLORIDE 9 MG/ML
INJECTION, SOLUTION INTRAVENOUS PRN
Status: DISCONTINUED | OUTPATIENT
Start: 2023-05-22 | End: 2023-06-09 | Stop reason: HOSPADM

## 2023-05-22 RX ORDER — CALCIUM ACETATE 667 MG/1
1 CAPSULE ORAL
Status: DISCONTINUED | OUTPATIENT
Start: 2023-05-23 | End: 2023-06-09 | Stop reason: HOSPADM

## 2023-05-22 RX ORDER — TRAZODONE HYDROCHLORIDE 50 MG/1
50 TABLET ORAL DAILY
COMMUNITY
Start: 2023-04-16

## 2023-05-22 RX ORDER — OXYCODONE HYDROCHLORIDE 5 MG/1
10 TABLET ORAL ONCE
Status: COMPLETED | OUTPATIENT
Start: 2023-05-22 | End: 2023-05-22

## 2023-05-22 RX ORDER — QUETIAPINE FUMARATE 25 MG/1
50 TABLET, FILM COATED ORAL NIGHTLY
Status: DISCONTINUED | OUTPATIENT
Start: 2023-05-22 | End: 2023-06-09 | Stop reason: HOSPADM

## 2023-05-22 RX ORDER — PRAVASTATIN SODIUM 40 MG
40 TABLET ORAL DAILY
Status: DISCONTINUED | OUTPATIENT
Start: 2023-05-23 | End: 2023-06-09 | Stop reason: HOSPADM

## 2023-05-22 RX ORDER — INSULIN LISPRO 100 [IU]/ML
0-4 INJECTION, SOLUTION INTRAVENOUS; SUBCUTANEOUS NIGHTLY
Status: DISCONTINUED | OUTPATIENT
Start: 2023-05-22 | End: 2023-06-09 | Stop reason: HOSPADM

## 2023-05-22 RX ADMIN — OXYCODONE 10 MG: 5 TABLET ORAL at 18:31

## 2023-05-22 RX ADMIN — HYDROMORPHONE HYDROCHLORIDE 1 MG: 1 INJECTION, SOLUTION INTRAMUSCULAR; INTRAVENOUS; SUBCUTANEOUS at 19:45

## 2023-05-22 RX ADMIN — QUETIAPINE FUMARATE 50 MG: 25 TABLET ORAL at 21:49

## 2023-05-22 RX ADMIN — ISOSORBIDE DINITRATE 20 MG: 10 TABLET ORAL at 21:48

## 2023-05-22 RX ADMIN — CEFEPIME 2000 MG: 2 INJECTION, POWDER, FOR SOLUTION INTRAVENOUS at 20:34

## 2023-05-22 RX ADMIN — VANCOMYCIN HYDROCHLORIDE 1500 MG: 10 INJECTION, POWDER, LYOPHILIZED, FOR SOLUTION INTRAVENOUS at 21:46

## 2023-05-22 RX ADMIN — GABAPENTIN 100 MG: 100 CAPSULE ORAL at 21:48

## 2023-05-22 RX ADMIN — METOPROLOL SUCCINATE 100 MG: 50 TABLET, EXTENDED RELEASE ORAL at 21:48

## 2023-05-22 RX ADMIN — TRAZODONE HYDROCHLORIDE 50 MG: 50 TABLET ORAL at 23:18

## 2023-05-22 ASSESSMENT — LIFESTYLE VARIABLES
HOW MANY STANDARD DRINKS CONTAINING ALCOHOL DO YOU HAVE ON A TYPICAL DAY: PATIENT DOES NOT DRINK
HOW OFTEN DO YOU HAVE A DRINK CONTAINING ALCOHOL: NEVER

## 2023-05-22 ASSESSMENT — PAIN SCALES - GENERAL
PAINLEVEL_OUTOF10: 10
PAINLEVEL_OUTOF10: 8

## 2023-05-22 ASSESSMENT — PAIN DESCRIPTION - LOCATION
LOCATION: FOOT
LOCATION: FOOT
LOCATION: BACK;HIP

## 2023-05-22 ASSESSMENT — PAIN DESCRIPTION - ORIENTATION
ORIENTATION: RIGHT
ORIENTATION: RIGHT
ORIENTATION: RIGHT;LOWER;LEFT

## 2023-05-22 ASSESSMENT — PAIN DESCRIPTION - FREQUENCY: FREQUENCY: CONTINUOUS

## 2023-05-22 ASSESSMENT — PAIN - FUNCTIONAL ASSESSMENT: PAIN_FUNCTIONAL_ASSESSMENT: 0-10

## 2023-05-22 ASSESSMENT — PAIN DESCRIPTION - PAIN TYPE: TYPE: CHRONIC PAIN

## 2023-05-22 ASSESSMENT — PAIN DESCRIPTION - DESCRIPTORS: DESCRIPTORS: ACHING

## 2023-05-23 LAB
25(OH)D3 SERPL-MCNC: 26.1 NG/ML
ALBUMIN SERPL-MCNC: 3.3 G/DL (ref 3.4–5)
ANION GAP SERPL CALCULATED.3IONS-SCNC: 15 MMOL/L (ref 3–16)
BASOPHILS # BLD: 0.1 K/UL (ref 0–0.2)
BASOPHILS NFR BLD: 0.7 %
BUN SERPL-MCNC: 56 MG/DL (ref 7–20)
CALCIUM SERPL-MCNC: 8.6 MG/DL (ref 8.3–10.6)
CHLORIDE SERPL-SCNC: 90 MMOL/L (ref 99–110)
CO2 SERPL-SCNC: 26 MMOL/L (ref 21–32)
CREAT SERPL-MCNC: 5.8 MG/DL (ref 0.9–1.3)
DEPRECATED RDW RBC AUTO: 16.6 % (ref 12.4–15.4)
EOSINOPHIL # BLD: 0.3 K/UL (ref 0–0.6)
EOSINOPHIL NFR BLD: 3.1 %
EST. AVERAGE GLUCOSE BLD GHB EST-MCNC: 220.2 MG/DL
GFR SERPLBLD CREATININE-BSD FMLA CKD-EPI: 11 ML/MIN/{1.73_M2}
GLUCOSE BLD-MCNC: 226 MG/DL (ref 70–99)
GLUCOSE BLD-MCNC: 242 MG/DL (ref 70–99)
GLUCOSE BLD-MCNC: 268 MG/DL (ref 70–99)
GLUCOSE BLD-MCNC: 311 MG/DL (ref 70–99)
GLUCOSE SERPL-MCNC: 293 MG/DL (ref 70–99)
HBA1C MFR BLD: 9.3 %
HCT VFR BLD AUTO: 28.7 % (ref 40.5–52.5)
HGB BLD-MCNC: 9.4 G/DL (ref 13.5–17.5)
LYMPHOCYTES # BLD: 0.5 K/UL (ref 1–5.1)
LYMPHOCYTES NFR BLD: 5.1 %
MAGNESIUM SERPL-MCNC: 2.1 MG/DL (ref 1.8–2.4)
MCH RBC QN AUTO: 29.5 PG (ref 26–34)
MCHC RBC AUTO-ENTMCNC: 32.8 G/DL (ref 31–36)
MCV RBC AUTO: 90 FL (ref 80–100)
MONOCYTES # BLD: 0.8 K/UL (ref 0–1.3)
MONOCYTES NFR BLD: 7.8 %
NEUTROPHILS # BLD: 8.3 K/UL (ref 1.7–7.7)
NEUTROPHILS NFR BLD: 83.3 %
PERFORMED ON: ABNORMAL
PHOSPHATE SERPL-MCNC: 5 MG/DL (ref 2.5–4.9)
PLATELET # BLD AUTO: 250 K/UL (ref 135–450)
PMV BLD AUTO: 7.1 FL (ref 5–10.5)
POTASSIUM SERPL-SCNC: 4.8 MMOL/L (ref 3.5–5.1)
RBC # BLD AUTO: 3.19 M/UL (ref 4.2–5.9)
SODIUM SERPL-SCNC: 131 MMOL/L (ref 136–145)
VANCOMYCIN SERPL-MCNC: 17.1 UG/ML
WBC # BLD AUTO: 10 K/UL (ref 4–11)

## 2023-05-23 PROCEDURE — 1200000000 HC SEMI PRIVATE

## 2023-05-23 PROCEDURE — 80069 RENAL FUNCTION PANEL: CPT

## 2023-05-23 PROCEDURE — 85025 COMPLETE CBC W/AUTO DIFF WBC: CPT

## 2023-05-23 PROCEDURE — 6370000000 HC RX 637 (ALT 250 FOR IP): Performed by: STUDENT IN AN ORGANIZED HEALTH CARE EDUCATION/TRAINING PROGRAM

## 2023-05-23 PROCEDURE — 80202 ASSAY OF VANCOMYCIN: CPT

## 2023-05-23 PROCEDURE — 2500000003 HC RX 250 WO HCPCS: Performed by: STUDENT IN AN ORGANIZED HEALTH CARE EDUCATION/TRAINING PROGRAM

## 2023-05-23 PROCEDURE — 2700000000 HC OXYGEN THERAPY PER DAY

## 2023-05-23 PROCEDURE — 83735 ASSAY OF MAGNESIUM: CPT

## 2023-05-23 PROCEDURE — 6370000000 HC RX 637 (ALT 250 FOR IP): Performed by: NURSE PRACTITIONER

## 2023-05-23 PROCEDURE — 2580000003 HC RX 258: Performed by: STUDENT IN AN ORGANIZED HEALTH CARE EDUCATION/TRAINING PROGRAM

## 2023-05-23 PROCEDURE — 36415 COLL VENOUS BLD VENIPUNCTURE: CPT

## 2023-05-23 PROCEDURE — 94761 N-INVAS EAR/PLS OXIMETRY MLT: CPT

## 2023-05-23 PROCEDURE — 6360000002 HC RX W HCPCS: Performed by: STUDENT IN AN ORGANIZED HEALTH CARE EDUCATION/TRAINING PROGRAM

## 2023-05-23 RX ORDER — CALCIUM CARBONATE 500 MG/1
500 TABLET, CHEWABLE ORAL 3 TIMES DAILY PRN
Status: DISCONTINUED | OUTPATIENT
Start: 2023-05-23 | End: 2023-06-09 | Stop reason: HOSPADM

## 2023-05-23 RX ORDER — OXYCODONE HYDROCHLORIDE 5 MG/1
5 TABLET ORAL EVERY 6 HOURS PRN
Status: COMPLETED | OUTPATIENT
Start: 2023-05-23 | End: 2023-05-25

## 2023-05-23 RX ADMIN — CEFEPIME 500 MG: 1 INJECTION, POWDER, FOR SOLUTION INTRAMUSCULAR; INTRAVENOUS at 21:32

## 2023-05-23 RX ADMIN — ALUMINUM HYDROXIDE, MAGNESIUM HYDROXIDE, AND SIMETHICONE: 200; 200; 20 SUSPENSION ORAL at 14:53

## 2023-05-23 RX ADMIN — OXYCODONE HYDROCHLORIDE 5 MG: 5 TABLET ORAL at 18:09

## 2023-05-23 RX ADMIN — ISOSORBIDE DINITRATE 20 MG: 10 TABLET ORAL at 21:07

## 2023-05-23 RX ADMIN — OXYCODONE HYDROCHLORIDE 5 MG: 5 TABLET ORAL at 00:29

## 2023-05-23 RX ADMIN — QUETIAPINE FUMARATE 50 MG: 25 TABLET ORAL at 21:07

## 2023-05-23 RX ADMIN — HEPARIN SODIUM 5000 UNITS: 5000 INJECTION INTRAVENOUS; SUBCUTANEOUS at 22:56

## 2023-05-23 RX ADMIN — DULOXETINE HYDROCHLORIDE 60 MG: 60 CAPSULE, DELAYED RELEASE ORAL at 09:35

## 2023-05-23 RX ADMIN — GABAPENTIN 100 MG: 100 CAPSULE ORAL at 21:07

## 2023-05-23 RX ADMIN — CALCIUM CARBONATE 500 MG: 500 TABLET, CHEWABLE ORAL at 12:23

## 2023-05-23 RX ADMIN — TRAZODONE HYDROCHLORIDE 50 MG: 50 TABLET ORAL at 21:07

## 2023-05-23 RX ADMIN — TORSEMIDE 100 MG: 100 TABLET ORAL at 09:36

## 2023-05-23 RX ADMIN — HEPARIN SODIUM 5000 UNITS: 5000 INJECTION INTRAVENOUS; SUBCUTANEOUS at 05:36

## 2023-05-23 RX ADMIN — ISOSORBIDE DINITRATE 20 MG: 10 TABLET ORAL at 09:35

## 2023-05-23 RX ADMIN — HEPARIN SODIUM 5000 UNITS: 5000 INJECTION INTRAVENOUS; SUBCUTANEOUS at 14:53

## 2023-05-23 RX ADMIN — PRAVASTATIN SODIUM 40 MG: 40 TABLET ORAL at 09:35

## 2023-05-23 RX ADMIN — SODIUM CHLORIDE, PRESERVATIVE FREE 10 ML: 5 INJECTION INTRAVENOUS at 09:37

## 2023-05-23 RX ADMIN — SODIUM CHLORIDE, PRESERVATIVE FREE 10 ML: 5 INJECTION INTRAVENOUS at 21:10

## 2023-05-23 RX ADMIN — GABAPENTIN 100 MG: 100 CAPSULE ORAL at 09:35

## 2023-05-23 RX ADMIN — METOPROLOL SUCCINATE 100 MG: 50 TABLET, EXTENDED RELEASE ORAL at 21:06

## 2023-05-23 RX ADMIN — PANTOPRAZOLE SODIUM 40 MG: 40 TABLET, DELAYED RELEASE ORAL at 05:37

## 2023-05-23 RX ADMIN — ONDANSETRON 4 MG: 4 TABLET, ORALLY DISINTEGRATING ORAL at 03:16

## 2023-05-23 RX ADMIN — GABAPENTIN 100 MG: 100 CAPSULE ORAL at 14:53

## 2023-05-23 RX ADMIN — ISOSORBIDE DINITRATE 20 MG: 10 TABLET ORAL at 14:53

## 2023-05-23 RX ADMIN — CALCIUM CARBONATE 500 MG: 500 TABLET, CHEWABLE ORAL at 03:16

## 2023-05-23 RX ADMIN — CALCIUM ACETATE 667 MG: 667 CAPSULE ORAL at 13:17

## 2023-05-23 RX ADMIN — METOPROLOL SUCCINATE 100 MG: 50 TABLET, EXTENDED RELEASE ORAL at 09:35

## 2023-05-23 ASSESSMENT — PAIN DESCRIPTION - LOCATION
LOCATION: LEG;FOOT
LOCATION: GENERALIZED
LOCATION: FOOT
LOCATION: FOOT

## 2023-05-23 ASSESSMENT — PAIN DESCRIPTION - ONSET: ONSET: ON-GOING

## 2023-05-23 ASSESSMENT — PAIN SCALES - GENERAL
PAINLEVEL_OUTOF10: 7
PAINLEVEL_OUTOF10: 7
PAINLEVEL_OUTOF10: 8
PAINLEVEL_OUTOF10: 8
PAINLEVEL_OUTOF10: 9

## 2023-05-23 ASSESSMENT — PAIN DESCRIPTION - FREQUENCY: FREQUENCY: CONTINUOUS

## 2023-05-23 ASSESSMENT — PAIN DESCRIPTION - ORIENTATION
ORIENTATION: RIGHT
ORIENTATION: RIGHT;LEFT
ORIENTATION: RIGHT

## 2023-05-23 ASSESSMENT — PAIN DESCRIPTION - PAIN TYPE: TYPE: CHRONIC PAIN

## 2023-05-23 ASSESSMENT — PAIN DESCRIPTION - DESCRIPTORS
DESCRIPTORS: SHARP;ACHING
DESCRIPTORS: ACHING

## 2023-05-23 ASSESSMENT — PAIN - FUNCTIONAL ASSESSMENT: PAIN_FUNCTIONAL_ASSESSMENT: ACTIVITIES ARE NOT PREVENTED

## 2023-05-24 PROBLEM — R00.1 SYMPTOMATIC BRADYCARDIA: Status: ACTIVE | Noted: 2023-05-24

## 2023-05-24 PROBLEM — R57.0 CARDIOGENIC SHOCK (HCC): Status: ACTIVE | Noted: 2023-05-24

## 2023-05-24 PROBLEM — I27.20 PULMONARY HTN (HCC): Status: ACTIVE | Noted: 2023-05-24

## 2023-05-24 PROBLEM — I35.9 AORTIC VALVE DISEASE: Status: ACTIVE | Noted: 2023-05-24

## 2023-05-24 LAB
ALBUMIN SERPL-MCNC: 3.2 G/DL (ref 3.4–5)
ALBUMIN SERPL-MCNC: 3.2 G/DL (ref 3.4–5)
ANION GAP SERPL CALCULATED.3IONS-SCNC: 15 MMOL/L (ref 3–16)
ANION GAP SERPL CALCULATED.3IONS-SCNC: 16 MMOL/L (ref 3–16)
BASOPHILS # BLD: 0.1 K/UL (ref 0–0.2)
BASOPHILS NFR BLD: 0.8 %
BUN SERPL-MCNC: 55 MG/DL (ref 7–20)
BUN SERPL-MCNC: 70 MG/DL (ref 7–20)
CA-I BLD-SCNC: 0.96 MMOL/L (ref 1.12–1.32)
CALCIUM SERPL-MCNC: 8.4 MG/DL (ref 8.3–10.6)
CALCIUM SERPL-MCNC: 8.5 MG/DL (ref 8.3–10.6)
CHLORIDE SERPL-SCNC: 89 MMOL/L (ref 99–110)
CHLORIDE SERPL-SCNC: 89 MMOL/L (ref 99–110)
CO2 SERPL-SCNC: 20 MMOL/L (ref 21–32)
CO2 SERPL-SCNC: 24 MMOL/L (ref 21–32)
CREAT SERPL-MCNC: 5.9 MG/DL (ref 0.9–1.3)
CREAT SERPL-MCNC: 7.5 MG/DL (ref 0.9–1.3)
DEPRECATED RDW RBC AUTO: 16.8 % (ref 12.4–15.4)
EKG ATRIAL RATE: 29 BPM
EKG DIAGNOSIS: NORMAL
EKG Q-T INTERVAL: 470 MS
EKG QRS DURATION: 108 MS
EKG QTC CALCULATION (BAZETT): 392 MS
EKG R AXIS: 51 DEGREES
EKG T AXIS: 86 DEGREES
EKG VENTRICULAR RATE: 42 BPM
EOSINOPHIL # BLD: 0.5 K/UL (ref 0–0.6)
EOSINOPHIL NFR BLD: 5.7 %
GFR SERPLBLD CREATININE-BSD FMLA CKD-EPI: 11 ML/MIN/{1.73_M2}
GFR SERPLBLD CREATININE-BSD FMLA CKD-EPI: 8 ML/MIN/{1.73_M2}
GLUCOSE BLD-MCNC: 191 MG/DL (ref 70–99)
GLUCOSE BLD-MCNC: 199 MG/DL (ref 70–99)
GLUCOSE BLD-MCNC: 224 MG/DL (ref 70–99)
GLUCOSE BLD-MCNC: 232 MG/DL (ref 70–99)
GLUCOSE BLD-MCNC: 285 MG/DL (ref 70–99)
GLUCOSE SERPL-MCNC: 180 MG/DL (ref 70–99)
GLUCOSE SERPL-MCNC: 261 MG/DL (ref 70–99)
HCT VFR BLD AUTO: 28.6 % (ref 40.5–52.5)
HGB BLD-MCNC: 9.4 G/DL (ref 13.5–17.5)
LYMPHOCYTES # BLD: 0.8 K/UL (ref 1–5.1)
LYMPHOCYTES NFR BLD: 9.8 %
MAGNESIUM SERPL-MCNC: 2 MG/DL (ref 1.8–2.4)
MAGNESIUM SERPL-MCNC: 2.1 MG/DL (ref 1.8–2.4)
MCH RBC QN AUTO: 29.2 PG (ref 26–34)
MCHC RBC AUTO-ENTMCNC: 32.9 G/DL (ref 31–36)
MCV RBC AUTO: 88.9 FL (ref 80–100)
MONOCYTES # BLD: 0.6 K/UL (ref 0–1.3)
MONOCYTES NFR BLD: 8.1 %
NEUTROPHILS # BLD: 6 K/UL (ref 1.7–7.7)
NEUTROPHILS NFR BLD: 75.6 %
PERFORMED ON: ABNORMAL
PH BLDV: 7.31 [PH] (ref 7.35–7.45)
PHOSPHATE SERPL-MCNC: 3.8 MG/DL (ref 2.5–4.9)
PHOSPHATE SERPL-MCNC: 6 MG/DL (ref 2.5–4.9)
PLATELET # BLD AUTO: 254 K/UL (ref 135–450)
PMV BLD AUTO: 6.8 FL (ref 5–10.5)
POTASSIUM SERPL-SCNC: 5.6 MMOL/L (ref 3.5–5.1)
POTASSIUM SERPL-SCNC: 6.1 MMOL/L (ref 3.5–5.1)
RBC # BLD AUTO: 3.21 M/UL (ref 4.2–5.9)
SODIUM SERPL-SCNC: 125 MMOL/L (ref 136–145)
SODIUM SERPL-SCNC: 128 MMOL/L (ref 136–145)
VANCOMYCIN SERPL-MCNC: 14.9 UG/ML
WBC # BLD AUTO: 8 K/UL (ref 4–11)

## 2023-05-24 PROCEDURE — 2580000003 HC RX 258: Performed by: STUDENT IN AN ORGANIZED HEALTH CARE EDUCATION/TRAINING PROGRAM

## 2023-05-24 PROCEDURE — 6370000000 HC RX 637 (ALT 250 FOR IP): Performed by: STUDENT IN AN ORGANIZED HEALTH CARE EDUCATION/TRAINING PROGRAM

## 2023-05-24 PROCEDURE — 6360000002 HC RX W HCPCS: Performed by: INTERNAL MEDICINE

## 2023-05-24 PROCEDURE — 83735 ASSAY OF MAGNESIUM: CPT

## 2023-05-24 PROCEDURE — 94761 N-INVAS EAR/PLS OXIMETRY MLT: CPT

## 2023-05-24 PROCEDURE — 2500000003 HC RX 250 WO HCPCS: Performed by: INTERNAL MEDICINE

## 2023-05-24 PROCEDURE — 2580000003 HC RX 258: Performed by: NURSE PRACTITIONER

## 2023-05-24 PROCEDURE — 2000000000 HC ICU R&B

## 2023-05-24 PROCEDURE — 93005 ELECTROCARDIOGRAM TRACING: CPT | Performed by: NURSE PRACTITIONER

## 2023-05-24 PROCEDURE — 93010 ELECTROCARDIOGRAM REPORT: CPT | Performed by: INTERNAL MEDICINE

## 2023-05-24 PROCEDURE — 6360000002 HC RX W HCPCS: Performed by: STUDENT IN AN ORGANIZED HEALTH CARE EDUCATION/TRAINING PROGRAM

## 2023-05-24 PROCEDURE — 94660 CPAP INITIATION&MGMT: CPT

## 2023-05-24 PROCEDURE — 36415 COLL VENOUS BLD VENIPUNCTURE: CPT

## 2023-05-24 PROCEDURE — 2580000003 HC RX 258: Performed by: INTERNAL MEDICINE

## 2023-05-24 PROCEDURE — 90945 DIALYSIS ONE EVALUATION: CPT

## 2023-05-24 PROCEDURE — 2700000000 HC OXYGEN THERAPY PER DAY

## 2023-05-24 PROCEDURE — 82330 ASSAY OF CALCIUM: CPT

## 2023-05-24 PROCEDURE — 85025 COMPLETE CBC W/AUTO DIFF WBC: CPT

## 2023-05-24 PROCEDURE — 80069 RENAL FUNCTION PANEL: CPT

## 2023-05-24 PROCEDURE — 6360000002 HC RX W HCPCS: Performed by: NURSE PRACTITIONER

## 2023-05-24 PROCEDURE — 2500000003 HC RX 250 WO HCPCS: Performed by: NURSE PRACTITIONER

## 2023-05-24 PROCEDURE — 6370000000 HC RX 637 (ALT 250 FOR IP): Performed by: NURSE PRACTITIONER

## 2023-05-24 PROCEDURE — 80202 ASSAY OF VANCOMYCIN: CPT

## 2023-05-24 PROCEDURE — 99291 CRITICAL CARE FIRST HOUR: CPT | Performed by: INTERNAL MEDICINE

## 2023-05-24 RX ORDER — DOPAMINE HYDROCHLORIDE 160 MG/100ML
1-20 INJECTION, SOLUTION INTRAVENOUS CONTINUOUS
Status: DISCONTINUED | OUTPATIENT
Start: 2023-05-24 | End: 2023-05-25

## 2023-05-24 RX ORDER — CLOPIDOGREL BISULFATE 75 MG/1
75 TABLET ORAL DAILY
Status: DISCONTINUED | OUTPATIENT
Start: 2023-05-24 | End: 2023-05-24

## 2023-05-24 RX ORDER — ATROPINE SULFATE 0.1 MG/ML
INJECTION INTRAVENOUS
Status: COMPLETED | OUTPATIENT
Start: 2023-05-24 | End: 2023-05-24

## 2023-05-24 RX ORDER — MIDODRINE HYDROCHLORIDE 5 MG/1
TABLET ORAL
Status: DISPENSED
Start: 2023-05-24 | End: 2023-05-25

## 2023-05-24 RX ORDER — POTASSIUM CHLORIDE 29.8 MG/ML
20 INJECTION INTRAVENOUS PRN
Status: DISCONTINUED | OUTPATIENT
Start: 2023-05-24 | End: 2023-05-26

## 2023-05-24 RX ORDER — MAGNESIUM SULFATE 1 G/100ML
1000 INJECTION INTRAVENOUS PRN
Status: DISCONTINUED | OUTPATIENT
Start: 2023-05-24 | End: 2023-05-26

## 2023-05-24 RX ORDER — CALCIUM GLUCONATE 20 MG/ML
1000 INJECTION, SOLUTION INTRAVENOUS PRN
Status: DISCONTINUED | OUTPATIENT
Start: 2023-05-24 | End: 2023-05-26

## 2023-05-24 RX ORDER — PROCHLORPERAZINE EDISYLATE 5 MG/ML
10 INJECTION INTRAMUSCULAR; INTRAVENOUS ONCE
Status: COMPLETED | OUTPATIENT
Start: 2023-05-24 | End: 2023-05-24

## 2023-05-24 RX ORDER — CALCIUM GLUCONATE 20 MG/ML
2000 INJECTION, SOLUTION INTRAVENOUS PRN
Status: DISCONTINUED | OUTPATIENT
Start: 2023-05-24 | End: 2023-05-26

## 2023-05-24 RX ADMIN — ATROPINE SULFATE 0.5 MG: 0.1 INJECTION, SOLUTION INTRAVENOUS at 13:56

## 2023-05-24 RX ADMIN — DULOXETINE HYDROCHLORIDE 60 MG: 60 CAPSULE, DELAYED RELEASE ORAL at 09:18

## 2023-05-24 RX ADMIN — Medication 500 ML/HR: at 16:45

## 2023-05-24 RX ADMIN — VANCOMYCIN HYDROCHLORIDE 750 MG: 750 INJECTION, POWDER, LYOPHILIZED, FOR SOLUTION INTRAVENOUS at 18:02

## 2023-05-24 RX ADMIN — GABAPENTIN 100 MG: 100 CAPSULE ORAL at 17:36

## 2023-05-24 RX ADMIN — ISOSORBIDE DINITRATE 20 MG: 10 TABLET ORAL at 09:18

## 2023-05-24 RX ADMIN — PROCHLORPERAZINE EDISYLATE 10 MG: 5 INJECTION INTRAMUSCULAR; INTRAVENOUS at 16:12

## 2023-05-24 RX ADMIN — GLUCAGON HYDROCHLORIDE 1 MG: KIT at 14:09

## 2023-05-24 RX ADMIN — PANTOPRAZOLE SODIUM 40 MG: 40 TABLET, DELAYED RELEASE ORAL at 05:33

## 2023-05-24 RX ADMIN — MUPIROCIN: 20 OINTMENT TOPICAL at 20:10

## 2023-05-24 RX ADMIN — DOPAMINE HYDROCHLORIDE 5 MCG/KG/MIN: 160 INJECTION, SOLUTION INTRAVENOUS at 15:03

## 2023-05-24 RX ADMIN — PRAVASTATIN SODIUM 40 MG: 40 TABLET ORAL at 09:18

## 2023-05-24 RX ADMIN — METOPROLOL SUCCINATE 100 MG: 50 TABLET, EXTENDED RELEASE ORAL at 09:18

## 2023-05-24 RX ADMIN — TORSEMIDE 100 MG: 100 TABLET ORAL at 09:19

## 2023-05-24 RX ADMIN — HEPARIN SODIUM 5000 UNITS: 5000 INJECTION INTRAVENOUS; SUBCUTANEOUS at 17:36

## 2023-05-24 RX ADMIN — HEPARIN SODIUM 5000 UNITS: 5000 INJECTION INTRAVENOUS; SUBCUTANEOUS at 05:33

## 2023-05-24 RX ADMIN — GABAPENTIN 100 MG: 100 CAPSULE ORAL at 20:20

## 2023-05-24 RX ADMIN — OXYCODONE HYDROCHLORIDE 5 MG: 5 TABLET ORAL at 00:07

## 2023-05-24 RX ADMIN — HEPARIN SODIUM 5000 UNITS: 5000 INJECTION INTRAVENOUS; SUBCUTANEOUS at 20:20

## 2023-05-24 RX ADMIN — Medication 2000 ML/HR: at 21:42

## 2023-05-24 RX ADMIN — MIDODRINE HYDROCHLORIDE 10 MG: 5 TABLET ORAL at 17:37

## 2023-05-24 RX ADMIN — OXYCODONE HYDROCHLORIDE 5 MG: 5 TABLET ORAL at 18:38

## 2023-05-24 RX ADMIN — Medication 2000 ML/HR: at 19:12

## 2023-05-24 RX ADMIN — Medication 2000 ML/HR: at 16:45

## 2023-05-24 RX ADMIN — INSULIN LISPRO 4 UNITS: 100 INJECTION, SOLUTION INTRAVENOUS; SUBCUTANEOUS at 17:43

## 2023-05-24 RX ADMIN — SODIUM CHLORIDE, PRESERVATIVE FREE 10 ML: 5 INJECTION INTRAVENOUS at 09:20

## 2023-05-24 RX ADMIN — ATROPINE SULFATE 0.5 MG: 0.1 INJECTION, SOLUTION INTRAVENOUS at 14:17

## 2023-05-24 RX ADMIN — SODIUM CHLORIDE 5 MCG/MIN: 9 INJECTION, SOLUTION INTRAVENOUS at 18:48

## 2023-05-24 RX ADMIN — GABAPENTIN 100 MG: 100 CAPSULE ORAL at 09:19

## 2023-05-24 RX ADMIN — Medication 1000 ML/HR: at 16:45

## 2023-05-24 RX ADMIN — Medication 1000 ML/HR: at 21:40

## 2023-05-24 RX ADMIN — CEFEPIME 2000 MG: 2 INJECTION, POWDER, FOR SOLUTION INTRAVENOUS at 18:43

## 2023-05-24 RX ADMIN — SODIUM CHLORIDE, PRESERVATIVE FREE 10 ML: 5 INJECTION INTRAVENOUS at 20:09

## 2023-05-24 ASSESSMENT — PAIN DESCRIPTION - FREQUENCY: FREQUENCY: CONTINUOUS

## 2023-05-24 ASSESSMENT — PAIN SCALES - GENERAL
PAINLEVEL_OUTOF10: 8
PAINLEVEL_OUTOF10: 0
PAINLEVEL_OUTOF10: 4
PAINLEVEL_OUTOF10: 4

## 2023-05-24 ASSESSMENT — ENCOUNTER SYMPTOMS
SHORTNESS OF BREATH: 1
STRIDOR: 0

## 2023-05-24 ASSESSMENT — PAIN DESCRIPTION - LOCATION: LOCATION: FOOT

## 2023-05-25 PROBLEM — I96 GANGRENE OF RIGHT FOOT (HCC): Status: ACTIVE | Noted: 2023-05-25

## 2023-05-25 LAB
ALBUMIN SERPL-MCNC: 2.8 G/DL (ref 3.4–5)
ALBUMIN SERPL-MCNC: 3.2 G/DL (ref 3.4–5)
ALBUMIN SERPL-MCNC: 3.3 G/DL (ref 3.4–5)
ALBUMIN SERPL-MCNC: 3.5 G/DL (ref 3.4–5)
ANION GAP SERPL CALCULATED.3IONS-SCNC: 10 MMOL/L (ref 3–16)
ANION GAP SERPL CALCULATED.3IONS-SCNC: 14 MMOL/L (ref 3–16)
BASOPHILS # BLD: 0.1 K/UL (ref 0–0.2)
BASOPHILS NFR BLD: 0.7 %
BUN SERPL-MCNC: 25 MG/DL (ref 7–20)
BUN SERPL-MCNC: 30 MG/DL (ref 7–20)
BUN SERPL-MCNC: 39 MG/DL (ref 7–20)
BUN SERPL-MCNC: 46 MG/DL (ref 7–20)
CA-I BLD-SCNC: 1.06 MMOL/L (ref 1.12–1.32)
CA-I BLD-SCNC: 1.09 MMOL/L (ref 1.12–1.32)
CA-I BLD-SCNC: 1.1 MMOL/L (ref 1.12–1.32)
CALCIUM SERPL-MCNC: 8.2 MG/DL (ref 8.3–10.6)
CALCIUM SERPL-MCNC: 8.3 MG/DL (ref 8.3–10.6)
CALCIUM SERPL-MCNC: 8.8 MG/DL (ref 8.3–10.6)
CALCIUM SERPL-MCNC: 8.9 MG/DL (ref 8.3–10.6)
CHLORIDE SERPL-SCNC: 95 MMOL/L (ref 99–110)
CHLORIDE SERPL-SCNC: 97 MMOL/L (ref 99–110)
CHLORIDE SERPL-SCNC: 97 MMOL/L (ref 99–110)
CHLORIDE SERPL-SCNC: 99 MMOL/L (ref 99–110)
CO2 SERPL-SCNC: 19 MMOL/L (ref 21–32)
CO2 SERPL-SCNC: 24 MMOL/L (ref 21–32)
CO2 SERPL-SCNC: 25 MMOL/L (ref 21–32)
CO2 SERPL-SCNC: 26 MMOL/L (ref 21–32)
CORTIS AM PEAK SERPL-MCNC: 9.9 UG/DL (ref 4.3–22.4)
CREAT SERPL-MCNC: 2.7 MG/DL (ref 0.9–1.3)
CREAT SERPL-MCNC: 3.1 MG/DL (ref 0.9–1.3)
CREAT SERPL-MCNC: 3.9 MG/DL (ref 0.9–1.3)
CREAT SERPL-MCNC: 4.6 MG/DL (ref 0.9–1.3)
DEPRECATED RDW RBC AUTO: 16.5 % (ref 12.4–15.4)
EOSINOPHIL # BLD: 0.2 K/UL (ref 0–0.6)
EOSINOPHIL NFR BLD: 2.8 %
GFR SERPLBLD CREATININE-BSD FMLA CKD-EPI: 14 ML/MIN/{1.73_M2}
GFR SERPLBLD CREATININE-BSD FMLA CKD-EPI: 17 ML/MIN/{1.73_M2}
GFR SERPLBLD CREATININE-BSD FMLA CKD-EPI: 23 ML/MIN/{1.73_M2}
GFR SERPLBLD CREATININE-BSD FMLA CKD-EPI: 27 ML/MIN/{1.73_M2}
GLUCOSE BLD-MCNC: 110 MG/DL (ref 70–99)
GLUCOSE BLD-MCNC: 142 MG/DL (ref 70–99)
GLUCOSE BLD-MCNC: 151 MG/DL (ref 70–99)
GLUCOSE BLD-MCNC: 183 MG/DL (ref 70–99)
GLUCOSE SERPL-MCNC: 122 MG/DL (ref 70–99)
GLUCOSE SERPL-MCNC: 145 MG/DL (ref 70–99)
GLUCOSE SERPL-MCNC: 172 MG/DL (ref 70–99)
GLUCOSE SERPL-MCNC: 191 MG/DL (ref 70–99)
HCT VFR BLD AUTO: 27.6 % (ref 40.5–52.5)
HGB BLD-MCNC: 8.5 G/DL (ref 13.5–17.5)
LYMPHOCYTES # BLD: 0.3 K/UL (ref 1–5.1)
LYMPHOCYTES NFR BLD: 4.4 %
MAGNESIUM SERPL-MCNC: 2.2 MG/DL (ref 1.8–2.4)
MAGNESIUM SERPL-MCNC: 2.2 MG/DL (ref 1.8–2.4)
MAGNESIUM SERPL-MCNC: 2.3 MG/DL (ref 1.8–2.4)
MAGNESIUM SERPL-MCNC: 2.3 MG/DL (ref 1.8–2.4)
MCH RBC QN AUTO: 29 PG (ref 26–34)
MCHC RBC AUTO-ENTMCNC: 30.9 G/DL (ref 31–36)
MCV RBC AUTO: 93.8 FL (ref 80–100)
MONOCYTES # BLD: 0.4 K/UL (ref 0–1.3)
MONOCYTES NFR BLD: 5 %
NEUTROPHILS # BLD: 6.6 K/UL (ref 1.7–7.7)
NEUTROPHILS NFR BLD: 87.1 %
PERFORMED ON: ABNORMAL
PH BLDV: 7.32 [PH] (ref 7.35–7.45)
PH BLDV: 7.38 [PH] (ref 7.35–7.45)
PH BLDV: 7.38 [PH] (ref 7.35–7.45)
PHOSPHATE SERPL-MCNC: 2.2 MG/DL (ref 2.5–4.9)
PHOSPHATE SERPL-MCNC: 2.4 MG/DL (ref 2.5–4.9)
PHOSPHATE SERPL-MCNC: 2.7 MG/DL (ref 2.5–4.9)
PHOSPHATE SERPL-MCNC: 3.2 MG/DL (ref 2.5–4.9)
PLATELET # BLD AUTO: 228 K/UL (ref 135–450)
PMV BLD AUTO: 7.4 FL (ref 5–10.5)
POTASSIUM SERPL-SCNC: 4.8 MMOL/L (ref 3.5–5.1)
POTASSIUM SERPL-SCNC: 4.9 MMOL/L (ref 3.5–5.1)
POTASSIUM SERPL-SCNC: 5.1 MMOL/L (ref 3.5–5.1)
POTASSIUM SERPL-SCNC: 5.2 MMOL/L (ref 3.5–5.1)
RBC # BLD AUTO: 2.94 M/UL (ref 4.2–5.9)
SODIUM SERPL-SCNC: 128 MMOL/L (ref 136–145)
SODIUM SERPL-SCNC: 132 MMOL/L (ref 136–145)
SODIUM SERPL-SCNC: 133 MMOL/L (ref 136–145)
SODIUM SERPL-SCNC: 133 MMOL/L (ref 136–145)
VANCOMYCIN SERPL-MCNC: 15.2 UG/ML
WBC # BLD AUTO: 7.6 K/UL (ref 4–11)

## 2023-05-25 PROCEDURE — 80069 RENAL FUNCTION PANEL: CPT

## 2023-05-25 PROCEDURE — 76937 US GUIDE VASCULAR ACCESS: CPT | Performed by: SURGERY

## 2023-05-25 PROCEDURE — 2500000003 HC RX 250 WO HCPCS: Performed by: INTERNAL MEDICINE

## 2023-05-25 PROCEDURE — 2580000003 HC RX 258: Performed by: INTERNAL MEDICINE

## 2023-05-25 PROCEDURE — 99232 SBSQ HOSP IP/OBS MODERATE 35: CPT

## 2023-05-25 PROCEDURE — 99291 CRITICAL CARE FIRST HOUR: CPT | Performed by: INTERNAL MEDICINE

## 2023-05-25 PROCEDURE — 2580000003 HC RX 258: Performed by: STUDENT IN AN ORGANIZED HEALTH CARE EDUCATION/TRAINING PROGRAM

## 2023-05-25 PROCEDURE — 80202 ASSAY OF VANCOMYCIN: CPT

## 2023-05-25 PROCEDURE — 2709999900 HC NON-CHARGEABLE SUPPLY: Performed by: SURGERY

## 2023-05-25 PROCEDURE — 2700000000 HC OXYGEN THERAPY PER DAY

## 2023-05-25 PROCEDURE — 6370000000 HC RX 637 (ALT 250 FOR IP): Performed by: NURSE PRACTITIONER

## 2023-05-25 PROCEDURE — 75710 ARTERY X-RAYS ARM/LEG: CPT

## 2023-05-25 PROCEDURE — C1894 INTRO/SHEATH, NON-LASER: HCPCS | Performed by: SURGERY

## 2023-05-25 PROCEDURE — 2500000003 HC RX 250 WO HCPCS

## 2023-05-25 PROCEDURE — 86850 RBC ANTIBODY SCREEN: CPT

## 2023-05-25 PROCEDURE — C1887 CATHETER, GUIDING: HCPCS | Performed by: SURGERY

## 2023-05-25 PROCEDURE — 90945 DIALYSIS ONE EVALUATION: CPT

## 2023-05-25 PROCEDURE — 6360000002 HC RX W HCPCS: Performed by: STUDENT IN AN ORGANIZED HEALTH CARE EDUCATION/TRAINING PROGRAM

## 2023-05-25 PROCEDURE — 36246 INS CATH ABD/L-EXT ART 2ND: CPT | Performed by: SURGERY

## 2023-05-25 PROCEDURE — 6370000000 HC RX 637 (ALT 250 FOR IP): Performed by: STUDENT IN AN ORGANIZED HEALTH CARE EDUCATION/TRAINING PROGRAM

## 2023-05-25 PROCEDURE — 99152 MOD SED SAME PHYS/QHP 5/>YRS: CPT | Performed by: SURGERY

## 2023-05-25 PROCEDURE — 82533 TOTAL CORTISOL: CPT

## 2023-05-25 PROCEDURE — C1769 GUIDE WIRE: HCPCS | Performed by: SURGERY

## 2023-05-25 PROCEDURE — 6360000002 HC RX W HCPCS

## 2023-05-25 PROCEDURE — 86901 BLOOD TYPING SEROLOGIC RH(D): CPT

## 2023-05-25 PROCEDURE — 85025 COMPLETE CBC W/AUTO DIFF WBC: CPT

## 2023-05-25 PROCEDURE — 6360000002 HC RX W HCPCS: Performed by: INTERNAL MEDICINE

## 2023-05-25 PROCEDURE — 36415 COLL VENOUS BLD VENIPUNCTURE: CPT

## 2023-05-25 PROCEDURE — 94761 N-INVAS EAR/PLS OXIMETRY MLT: CPT

## 2023-05-25 PROCEDURE — 2000000000 HC ICU R&B

## 2023-05-25 PROCEDURE — 36247 INS CATH ABD/L-EXT ART 3RD: CPT

## 2023-05-25 PROCEDURE — 83735 ASSAY OF MAGNESIUM: CPT

## 2023-05-25 PROCEDURE — C1760 CLOSURE DEV, VASC: HCPCS | Performed by: SURGERY

## 2023-05-25 PROCEDURE — 86900 BLOOD TYPING SEROLOGIC ABO: CPT

## 2023-05-25 PROCEDURE — 75710 ARTERY X-RAYS ARM/LEG: CPT | Performed by: SURGERY

## 2023-05-25 PROCEDURE — 82330 ASSAY OF CALCIUM: CPT

## 2023-05-25 RX ORDER — MIDAZOLAM HYDROCHLORIDE 1 MG/ML
INJECTION INTRAMUSCULAR; INTRAVENOUS
Status: COMPLETED | OUTPATIENT
Start: 2023-05-25 | End: 2023-05-25

## 2023-05-25 RX ORDER — FENTANYL CITRATE 50 UG/ML
INJECTION, SOLUTION INTRAMUSCULAR; INTRAVENOUS
Status: COMPLETED | OUTPATIENT
Start: 2023-05-25 | End: 2023-05-25

## 2023-05-25 RX ADMIN — ISOSORBIDE DINITRATE 20 MG: 10 TABLET ORAL at 18:19

## 2023-05-25 RX ADMIN — OXYCODONE HYDROCHLORIDE 5 MG: 5 TABLET ORAL at 20:59

## 2023-05-25 RX ADMIN — CALCIUM GLUCONATE 1000 MG: 20 INJECTION, SOLUTION INTRAVENOUS at 00:14

## 2023-05-25 RX ADMIN — OXYCODONE HYDROCHLORIDE 5 MG: 5 TABLET ORAL at 09:03

## 2023-05-25 RX ADMIN — CEFEPIME 2000 MG: 2 INJECTION, POWDER, FOR SOLUTION INTRAVENOUS at 21:11

## 2023-05-25 RX ADMIN — Medication 1000 ML/HR: at 08:53

## 2023-05-25 RX ADMIN — FENTANYL CITRATE 50 MCG: 50 INJECTION, SOLUTION INTRAMUSCULAR; INTRAVENOUS at 16:35

## 2023-05-25 RX ADMIN — PRAVASTATIN SODIUM 40 MG: 40 TABLET ORAL at 07:59

## 2023-05-25 RX ADMIN — CALCIUM GLUCONATE 1000 MG: 20 INJECTION, SOLUTION INTRAVENOUS at 10:19

## 2023-05-25 RX ADMIN — ISOSORBIDE DINITRATE 20 MG: 10 TABLET ORAL at 20:59

## 2023-05-25 RX ADMIN — CEFEPIME 2000 MG: 2 INJECTION, POWDER, FOR SOLUTION INTRAVENOUS at 05:32

## 2023-05-25 RX ADMIN — Medication 2000 ML/HR: at 06:25

## 2023-05-25 RX ADMIN — MIDAZOLAM HYDROCHLORIDE 1 MG: 1 INJECTION INTRAMUSCULAR; INTRAVENOUS at 16:35

## 2023-05-25 RX ADMIN — Medication 1000 ML/HR: at 02:43

## 2023-05-25 RX ADMIN — TRAZODONE HYDROCHLORIDE 50 MG: 50 TABLET ORAL at 20:59

## 2023-05-25 RX ADMIN — CLOPIDOGREL BISULFATE 75 MG: 75 TABLET ORAL at 08:00

## 2023-05-25 RX ADMIN — ACETAMINOPHEN 650 MG: 325 TABLET ORAL at 20:58

## 2023-05-25 RX ADMIN — MUPIROCIN: 20 OINTMENT TOPICAL at 21:12

## 2023-05-25 RX ADMIN — Medication 2000 ML/HR: at 21:11

## 2023-05-25 RX ADMIN — HEPARIN SODIUM 5000 UNITS: 5000 INJECTION INTRAVENOUS; SUBCUTANEOUS at 20:59

## 2023-05-25 RX ADMIN — GABAPENTIN 100 MG: 100 CAPSULE ORAL at 18:21

## 2023-05-25 RX ADMIN — CALCIUM GLUCONATE 1000 MG: 20 INJECTION, SOLUTION INTRAVENOUS at 06:45

## 2023-05-25 RX ADMIN — Medication 500 ML/HR: at 02:42

## 2023-05-25 RX ADMIN — Medication 2000 ML/HR: at 23:11

## 2023-05-25 RX ADMIN — GABAPENTIN 100 MG: 100 CAPSULE ORAL at 07:58

## 2023-05-25 RX ADMIN — TORSEMIDE 100 MG: 100 TABLET ORAL at 07:59

## 2023-05-25 RX ADMIN — HEPARIN SODIUM 5000 UNITS: 5000 INJECTION INTRAVENOUS; SUBCUTANEOUS at 05:30

## 2023-05-25 RX ADMIN — HEPARIN SODIUM 5000 UNITS: 5000 INJECTION INTRAVENOUS; SUBCUTANEOUS at 14:22

## 2023-05-25 RX ADMIN — CALCIUM GLUCONATE 1000 MG: 20 INJECTION, SOLUTION INTRAVENOUS at 21:06

## 2023-05-25 RX ADMIN — Medication 2000 ML/HR: at 08:55

## 2023-05-25 RX ADMIN — MUPIROCIN: 20 OINTMENT TOPICAL at 08:00

## 2023-05-25 RX ADMIN — ISOSORBIDE DINITRATE 20 MG: 10 TABLET ORAL at 08:00

## 2023-05-25 RX ADMIN — SODIUM PHOSPHATE, MONOBASIC, MONOHYDRATE AND SODIUM PHOSPHATE, DIBASIC, ANHYDROUS 6 MMOL: 276; 142 INJECTION, SOLUTION INTRAVENOUS at 18:03

## 2023-05-25 RX ADMIN — SODIUM CHLORIDE, PRESERVATIVE FREE 10 ML: 5 INJECTION INTRAVENOUS at 08:00

## 2023-05-25 RX ADMIN — VANCOMYCIN HYDROCHLORIDE 1000 MG: 1 INJECTION, POWDER, LYOPHILIZED, FOR SOLUTION INTRAVENOUS at 06:53

## 2023-05-25 RX ADMIN — DULOXETINE HYDROCHLORIDE 60 MG: 60 CAPSULE, DELAYED RELEASE ORAL at 07:59

## 2023-05-25 RX ADMIN — SODIUM CHLORIDE, PRESERVATIVE FREE 10 ML: 5 INJECTION INTRAVENOUS at 20:59

## 2023-05-25 RX ADMIN — EPOETIN ALFA-EPBX 10000 UNITS: 10000 INJECTION, SOLUTION INTRAVENOUS; SUBCUTANEOUS at 11:13

## 2023-05-25 RX ADMIN — Medication 2000 ML/HR: at 02:44

## 2023-05-25 RX ADMIN — GABAPENTIN 100 MG: 100 CAPSULE ORAL at 21:14

## 2023-05-25 RX ADMIN — Medication 2000 ML/HR: at 00:10

## 2023-05-25 ASSESSMENT — PAIN SCALES - GENERAL
PAINLEVEL_OUTOF10: 8
PAINLEVEL_OUTOF10: 0
PAINLEVEL_OUTOF10: 5
PAINLEVEL_OUTOF10: 0
PAINLEVEL_OUTOF10: 4
PAINLEVEL_OUTOF10: 10
PAINLEVEL_OUTOF10: 10
PAINLEVEL_OUTOF10: 6
PAINLEVEL_OUTOF10: 0

## 2023-05-25 ASSESSMENT — PAIN DESCRIPTION - LOCATION
LOCATION: BACK
LOCATION: BACK;HIP

## 2023-05-25 ASSESSMENT — PAIN DESCRIPTION - DESCRIPTORS
DESCRIPTORS: DISCOMFORT
DESCRIPTORS: ACHING
DESCRIPTORS: DISCOMFORT

## 2023-05-25 ASSESSMENT — PAIN - FUNCTIONAL ASSESSMENT: PAIN_FUNCTIONAL_ASSESSMENT: ACTIVITIES ARE NOT PREVENTED

## 2023-05-25 ASSESSMENT — PAIN DESCRIPTION - ORIENTATION
ORIENTATION: LOWER

## 2023-05-25 ASSESSMENT — PAIN DESCRIPTION - PAIN TYPE: TYPE: CHRONIC PAIN

## 2023-05-25 ASSESSMENT — PAIN DESCRIPTION - FREQUENCY: FREQUENCY: CONTINUOUS

## 2023-05-25 NOTE — H&P
ulcer     GERD (gastroesophageal reflux disease)     Heart valve problem     bicuspic valve    Hemodialysis patient ([facility]     History of spinal fracture     work incident    Hx of blood clots     Bilateral lower extremities; stents in place    Hyperlipidemia     Hypertension     MI (myocardial infarction) ([facility] 2019    has had 9 MIs. 2019 was the last    Neuromuscular disorder ([facility]     due to CVA    Numbness and tingling in left arm     from fistula    Pneumonia     PONV (postoperative nausea and vomiting)     Prolonged emergence from general anesthesia     States requires more medication than most people    Sleep apnea     Uses CPAP    Stroke ([facility]     7mm thalamic cva 2017 deficts left side, left side weakness    TIA (transient ischemic attack)     Unspecified diseases of blood and blood-forming organs        Past Surgical History:          Procedure Laterality Date    AORTIC VALVE REPLACEMENT N/A 10/15/2019    TRANSCATHETER AORTIC VALVE REPLACEMENT FEMORAL APPROACH performed by Ritu Lyons MD at [address] [place] [address] Right 7/2/2019    PERITONEAL DIALYSIS CATHETER REMOVAL performed by Jessica Leon MD at [facility] COLONOSCOPY  2/29/2015    WN    CORONARY ANGIOPLASTY WITH STENT PLACEMENT  05/26/15    CYST REMOVAL  08/14/2013    EXCISION CYSTS, NECK X2 AND ABDOMINAL benign    DIAGNOSTIC CARDIAC CATH LAB PROCEDURE      DIALYSIS FISTULA CREATION Left 10/30/2017    LEFT BRACHIAL CEPHALIC FISTULA    DIALYSIS FISTULA CREATION Left 3/27/2019    LIGATION  AV FISTULA performed by Myrtice Primrose, MD at [facility], [address] [place] [address], [facility]      OTHER SURGICAL HISTORY  02/01/2017    laparoscopic cholecystectomy with intraoperative cholangiogram    OTHER SURGICAL HISTORY  2018    PORT PLACEMENT  - vas cath    OTHER SURGICAL HISTORY Bilateral 06/26/2018    laprascopic peritoneal dialysis catheter placement    OTHER SURGICAL HISTORY Right 09/2018 peritoneal dialysis port placed on right side of abdomen    OTHER SURGICAL HISTORY  05/28/2019    PTA/Stenting R External Iliac artery    GA LAP INSERTION TUNNELED INTRAPERITONEAL CATHETER N/A 9/21/2018    LAPAROSCOPIC PERITONEAL DIALYSIS CATHETER REPLACEMENT performed by Lawyer Fransisco MD at [address] ENDOSCOPY  01/06/2016    UPPER GASTROINTESTINAL ENDOSCOPY  01/29/2017    possible candida, otherwise normal appearing    VASCULAR SURGERY  aprx 2 years ago    2 stents placed, each side of groin       Medications Prior to Admission:      Prior to Admission medications    Medication Sig Start Date End Date Taking? Authorizing Provider   Insulin Pen Needle 31G X 5 MM MISC 1 each by Does not apply route daily 12/1/20   Leobardo Negrete MD   oxyCODONE-acetaminophen (PERCOCET) 7.5-325 MG per tablet Take 1 tablet by mouth every 4 hours as needed for Pain.     Historical Provider, MD   hydrALAZINE (APRESOLINE) 50 MG tablet TAKE 1/2 TABLET BY MOUTH EVERY 8 HOURS 11/24/20   Leobardo Negrete MD   B Complex-C-Folic Acid (VIRT-CAPS) 1 MG CAPS TK ONE C PO  QD 11/18/20   Leobardo Negrete MD   Continuous Blood Gluc Sensor (FREESTYLE STEPHANY 14 DAY SENSOR) MISC 1 each by Does not apply route every 14 days 11/10/20   Leobardo Negrete MD   insulin glargine [facility]) 100 UNIT/ML injection pen Inject 70 Units into the skin nightly 11/10/20   Leobardo Negrete MD   dilTIAZem (CARDIZEM CD) 180 MG extended release capsule TAKE 1 CAPSULE BY MOUTH DAILY 11/4/20   Leobardo Negrete MD   traZODone (DESYREL) 150 MG tablet TAKE (1) TABLET BY MOUTH NIGHTLY 11/4/20   Leobardo Negrete MD   citalopram (CELEXA) 40 MG tablet TAKE (1) TABLET BY MOUTH DAILY 11/4/20   Leobardo Negrete MD   carvedilol (COREG) 12.5 MG tablet TAKE (1) TABLET BY MOUTH TWICE DAILY WITH MEALS 11/4/20   Leobardo Negrete MD   hydrOXYzine (VISTARIL) 50 MG capsule TAKE 1 TO 2 CAPSULES BY MOUTH NIGHTLY 10/12/20   Leobardo Negrete MD insulin aspart (NOVOLOG FLEXPEN) 100 UNIT/ML injection pen Inject 20 Units into the skin 3 times daily (before meals) 10/12/20   Yas Mcdowell MD   clopidogrel (PLAVIX) 75 MG tablet TAKE 1 TABLET BY MOUTH ONCE DAILY 10/2/20   Alfred Garcia MD   pravastatin (PRAVACHOL) 80 MG tablet TAKE (1) TABLET BY MOUTH ONCE DAILY 9/29/20   Alfred Garcia MD   QUEtiapine (SEROQUEL) 50 MG tablet TAKE 1 TABLET BY MOUTH EVERY EVENING 9/28/20   Yas Mcdowell MD   isosorbide mononitrate (IMDUR) 30 MG extended release tablet TAKE 1 TABLET BY MOUTH EVERY DAY 9/1/20   Yas Mcdowell MD   torsemide (DEMADEX) 100 MG tablet Take 1-2 tablets by mouth daily 8/5/20   Yas Mcdowell MD   ondansetron (ZOFRAN ODT) 4 MG disintegrating tablet Take 1 tablet by mouth every 8 hours as needed for Nausea 8/5/20   Yas Mcdowell MD   pregabalin (LYRICA) 25 MG capsule Take 1 capsule by mouth 3 times daily for 30 days. 8/5/20 11/10/20  Yas Mcdowell MD   LINZESS 145 MCG capsule TAKE 1 CAPSULE BY MOUTH EVERY MORNING BEFORE BREAKFAST 7/6/20   Yas Mcdowell MD   Calcium Acetate, Phos Binder, 667 MG CAPS TAKE 1 CAPSULE BY MOUTH THREE TIMES DAILY WITH MEALS 6/29/20   Yas Mcdowell MD   tiZANidine (ZANAFLEX) 4 MG tablet TAKE 1 TABLET BY MOUTH THREE TIMES DAILY 6/4/20   Yas Mcdowell MD   gabapentin (NEURONTIN) 100 MG capsule TAKE 1 TO 2 CAPSULES BY MOUTH THREE TIMES A DAY  Patient not taking: Reported on 11/10/2020 5/27/20 11/10/20  Yas Mcdowell MD   fluconazole (DIFLUCAN) 150 MG tablet TAKE 1 TABLET BY MOUTH 1 TIME FOR 1 DOSE 5/14/20   Yas Mcdowell MD   DULoxetine (CYMBALTA) 30 MG extended release capsule TAKE 1 CAPSULE BY MOUTH EVERY DAY 4/3/20   Lindajean Harada, APRN - CNP   nystatin-triamcinolone (MYCOLOG II) 095248-4.3 UNIT/GM-% cream Apply topically 2 times daily.  3/16/20   JAY Lamas CNP   losartan (COZAAR) 50 MG tablet TAKE (1) TABLET BY MOUTH DAILY 3/10/20   Yas Mcdowell MD   pantoprazole (PROTONIX) 40 MG tablet TAKE (1) TABLET BY MOUTH EACH MORNING BEFORE BREAKFAST 3/10/20   Ethel Crisostomo MD   albuterol (PROVENTIL) (2.5 MG/3ML) 0.083% nebulizer solution INHALE 1 VIAL VIA NEBULIZER EVERY 6 HOURS AS NEEDED FOR WHEEZING 3/10/20   Ethel Crisostomo MD   nystatin (MYCOSTATIN) 941128 UNIT/GM cream Apply topically 2 times daily. 3/4/20   Ethel Crisostomo MD   Insulin Syringe-Needle U-100 30G X 1/2\" 0.5 ML MISC 1 each by Does not apply route daily 3/4/20   Ethel Crisostomo MD   nitroGLYCERIN (NITROSTAT) 0.4 MG SL tablet DISSOLVE 1 TABLET UNDER THE TONGUE AS NEEDED FOR CHEST PAIN EVERY 5 MINUTES UP TO 3 TIMES. IF NO RELIEF CALL 911. 2/7/20   Ethel Crisostomo MD   blood glucose test strips (FREESTYLE LITE) strip Daily As needed. Patient not taking: Reported on 11/10/2020 8/19/19   Ethel Crisostomo MD   glucose monitoring kit (FREESTYLE) monitoring kit 1 kit by Does not apply route daily  Patient not taking: Reported on 11/10/2020 8/19/19   Ethel Crisostomo MD   vitamin D (ERGOCALCIFEROL) 03935 units CAPS capsule TK 1 C PO WEEKLY 6/2/19   Historical Provider, MD   flunisolide (NASALIDE) 25 MCG/ACT (0.025%) SOLN Inhale 2 sprays into the lungs every 12 hours 5/22/19   Fab Baca MD   Tiotropium Bromide-Olodaterol (STIOLTO RESPIMAT) 2.5-2.5 MCG/ACT AERS Inhale 2 puffs into the lungs daily 5/21/19   Fab Baca MD   Polyethylene Glycol 3350 GRAN  5/2/18   Historical Provider, MD   Glucose Blood (BLOOD GLUCOSE TEST STRIPS) STRP TEST 3-4 TIMES DAILY, AS DIRECTED  Patient not taking: Reported on 11/10/2020 4/25/18   Adelaide Lafleur MD   Blood Glucose Monitoring Suppl ADAM USE AS DIRECTED.   Patient not taking: Reported on 11/10/2020 4/25/18   Adelaide Lafleur MD   Alcohol Swabs PADS USE AS DIRECTED 4/25/18   Adelaide Lafleur MD   albuterol sulfate  (90 Base) MCG/ACT inhaler Inhale 2 puffs into the lungs every 6 hours as needed for Wheezing 11/8/17   Sol Deng MD   ipratropium-albuterol (DUONEB) 0.5-2.5 (3) MG/3ML SOLN nebulizer solution Inhale 3 mLs into the lungs every 6 hours as needed for Shortness of Breath 10/15/17   Nguyễn Jean MD   Lancets MISC Test daily  Patient not taking: Reported on 11/10/2020 5/25/17   Radha Carter MD   calcium carbonate (TUMS) 500 MG chewable tablet Take 1 tablet by mouth 3 times daily as needed for Heartburn. Historical Provider, MD   aspirin 81 MG chewable tablet Take 1 tablet by mouth daily. Patient taking differently: Take 81 mg by mouth daily Indications: stopped on 6/25 for surgery  5/14/13   Vicente Chappell MD       Allergies:  Morphine    Social History:      The patient currently lives at home    TOBACCO:   reports that he has been smoking cigarettes. He has a 33.00 pack-year smoking history. He has never used smokeless tobacco.  ETOH:   reports previous alcohol use. E-Cigarettes Vaping or Juuling     Questions Responses    Vaping Use Never User    Start Date     Does device contain nicotine? Never    Quit Date     Vaping Type             Family History:       Reviewed in detail and negative for DM, CAD, Cancer, CVA. Positive as follows:        Problem Relation Age of Onset    Diabetes Mother     Heart Disease Father     Kidney Disease Sister         stage 4-kidney failure    Cancer Sister     Heart Disease Sister     Obesity Sister     Cancer Sister     Heart Disease Sister     Obesity Sister     Alcohol Abuse Brother        REVIEW OF SYSTEMS:   Pertinent positives as noted in the HPI. All other systems reviewed and negative. PHYSICAL EXAM PERFORMED:    BP (!) 150/81   Pulse 76   Temp 97.7 °F (36.5 °C) (Oral)   Resp 18   Ht 5' 9\" (1.753 m)   Wt 260 lb (117.9 kg)   SpO2 100%   BMI 38.40 kg/m²     General appearance:  No apparent distress, appears stated age and cooperative. Obese  HEENT:  Normal cephalic, atraumatic without obvious deformity. Pupils equal, round, and reactive to light. Extra ocular muscles intact. Conjunctivae/corneas clear.   Neck: Supple, with full range of motion. No jugular venous distention. Trachea midline. Dialysis catheter in the left neck  Respiratory:  Normal respiratory effort. Clear to auscultation, bilaterally without Rales/Wheezes/Rhonchi. Reduced air entry  Cardiovascular:  Regular rate and rhythm with normal S1/S2 with  murmurs, rubs or gallops. Abdomen: Soft, non-tender, non-distended with normal bowel sounds. Obese  Musculoskeletal:  No clubbing, cyanosis or edema bilaterally. Skin: Skin color, texture, turgor normal.  No rashes or lesions. Neurologic: Good power over upper extremities 5 x 5, lower extremities he is trying to raise against gravity but is difficult I would say 2+, he does not have feeling below mid calf of bilateral lower extremity he is able to feel the deep pressure some extent. He has good sensation above mid calf on bilateral lower extremity. [facility]:  Alert and oriented, thought content appropriate, normal insight  Capillary Refill: Brisk,< 3 seconds   Peripheral Pulses: +2 palpable, equal bilaterally       Labs:     Recent Labs     12/04/20  1501   WBC 12.0*   HGB 9.7*   HCT 29.2*        Recent Labs     12/04/20  1501 12/04/20  1614   *  --    K 5.2* 4.5   CL 84*  --    CO2 25  --    BUN 90*  --    CREATININE 6.9*  --    CALCIUM 9.2  --      Recent Labs     12/04/20  1501   AST 35   ALT 18   BILITOT <0.2   ALKPHOS 96     No results for input(s): INR in the last 72 hours. No results for input(s): Francies Gathers in the last 72 hours.     Urinalysis:      Lab Results   Component Value Date    NITRU Negative 06/05/2020    WBCUA 10-20 06/05/2020    BACTERIA 2+ 06/05/2020    RBCUA 0-2 06/05/2020    BLOODU TRACE-INTACT 06/05/2020    SPECGRAV 1.020 06/05/2020    GLUCOSEU 250 06/05/2020       Radiology:     CXR: I have reviewed the CXR with the following interpretation:   EKG:  I have reviewed the EKG with the following interpretation:   Poor data quality, interpretation may be adversely affectedNormal sinus rhythmProlonged QTAbnormal ECGWhen compared with ECG of 04-JUN-2020 19:11,Sinus rhythm has replaced Junctional rhythmVent. rate has increased BY  38 BPMQuestionable change in QRS axisQT has lengthenedConfirmed by CARISSA Alvarado ([id number]    MRI LUMBAR SPINE WO CONTRAST   Final Result   1. Mild L1-2 and L5-S1 degenerative disc height loss. 2. Moderate right L5 neural foraminal narrowing secondary to a right   foraminal L5-S1 disc protrusion. 3. Mild L1-2 spinal canal stenosis. CT ABDOMEN PELVIS WO CONTRAST   Final Result   1. No CT evidence of an acute intra-abdominal or intrapelvic process. 2.  No findings to suggest acute appendicitis; no ureter calculus or   hydronephrosis. 3. Probably reactive lymph nodes in the right upper quadrant, unchanged from   prior study. 4.  Mild extrahepatic bile duct dilatation status post cholecystectomy   typical of reservoir effect. 5.  Calcific atherosclerotic disease aorta. 6. Small, fat containing umbilical hernia. 7. Benign left adrenal adenoma requires no additional evaluation or follow-up.              ASSESSMENT:    Active Hospital Problems    Diagnosis Date Noted    Weakness of both legs [R29.898] 12/04/2020         PLAN:  Bilateral lower extremity weakness numbness-not quite sure about the  actual cause, MRI shows L5-S1 disc protrusion, the whole picture does not go very well with us epidural abscess, still neuropathy O ascending paralysis is a possibility-admit, neurochecks, pulse ox monitoring, spinal surgery and neuro evaluation    ESRD on hemodialysis Monday Wednesday Friday and missed Wednesday and today's dialysis-nephrology evaluation possible dialysis tomorrow he does not have evidence of fluid overload or hyperkalemia or change in mental status now    Chronic back pain-continue home medication and muscle relaxant    Diabetes-continue long-acting insulin and monitor blood sugar with low correction scale insulin hemoglobin A1c, he takes preprandial insulin at home currently on hold    Hyponatremia-received normal saline in the ER-monitor BMP, nephrology on board , hyponatremia work-up, may hold Celexa    Anemia-hemoglobin stable no acute bleeding    Chronic diastolic CHF no evidence of fluid overload    COPD-stable continue home inhalers    CAD-Coreg, aspirin, Plavix and Pravachol          DVT Prophylaxis: Heparin subcu  Diet: DIET RENAL; low-carb diet  Code Status: Full code    PT/OT Eval Status:     Dispo -admitted to Rebekah Leon MD    Thank you Ester Rosenthal MD for the opportunity to be involved in this patient's care. If you have any questions or concerns please feel free to contact me at [phone]. no

## 2023-05-26 ENCOUNTER — ANESTHESIA EVENT (OUTPATIENT)
Dept: OPERATING ROOM | Age: 53
End: 2023-05-26
Payer: MEDICARE

## 2023-05-26 ENCOUNTER — ANESTHESIA (OUTPATIENT)
Dept: OPERATING ROOM | Age: 53
End: 2023-05-26
Payer: MEDICARE

## 2023-05-26 LAB
ABO + RH BLD: NORMAL
ALBUMIN SERPL-MCNC: 3.6 G/DL (ref 3.4–5)
ALBUMIN/GLOB SERPL: 1.1 {RATIO} (ref 1.1–2.2)
ALP SERPL-CCNC: 67 U/L (ref 40–129)
ALT SERPL-CCNC: 9 U/L (ref 10–40)
ANION GAP SERPL CALCULATED.3IONS-SCNC: 12 MMOL/L (ref 3–16)
AST SERPL-CCNC: 11 U/L (ref 15–37)
BACTERIA BLD CULT: NORMAL
BILIRUB SERPL-MCNC: 0.3 MG/DL (ref 0–1)
BLD GP AB SCN SERPL QL: NORMAL
BUN SERPL-MCNC: 19 MG/DL (ref 7–20)
CA-I BLD-SCNC: 1.1 MMOL/L (ref 1.12–1.32)
CALCIUM SERPL-MCNC: 9.2 MG/DL (ref 8.3–10.6)
CHLORIDE SERPL-SCNC: 99 MMOL/L (ref 99–110)
CO2 SERPL-SCNC: 23 MMOL/L (ref 21–32)
CREAT SERPL-MCNC: 2.3 MG/DL (ref 0.9–1.3)
GFR SERPLBLD CREATININE-BSD FMLA CKD-EPI: 33 ML/MIN/{1.73_M2}
GLUCOSE BLD-MCNC: 154 MG/DL (ref 70–99)
GLUCOSE BLD-MCNC: 247 MG/DL (ref 70–99)
GLUCOSE BLD-MCNC: 299 MG/DL (ref 70–99)
GLUCOSE SERPL-MCNC: 159 MG/DL (ref 70–99)
MAGNESIUM SERPL-MCNC: 2.3 MG/DL (ref 1.8–2.4)
PERFORMED ON: ABNORMAL
PH BLDV: 7.38 [PH] (ref 7.35–7.45)
POTASSIUM SERPL-SCNC: 4.9 MMOL/L (ref 3.5–5.1)
PROT SERPL-MCNC: 6.9 G/DL (ref 6.4–8.2)
SODIUM SERPL-SCNC: 134 MMOL/L (ref 136–145)
VANCOMYCIN SERPL-MCNC: 8 UG/ML

## 2023-05-26 PROCEDURE — 2500000003 HC RX 250 WO HCPCS: Performed by: NURSE ANESTHETIST, CERTIFIED REGISTERED

## 2023-05-26 PROCEDURE — A4217 STERILE WATER/SALINE, 500 ML: HCPCS | Performed by: PODIATRIST

## 2023-05-26 PROCEDURE — 94761 N-INVAS EAR/PLS OXIMETRY MLT: CPT

## 2023-05-26 PROCEDURE — 6370000000 HC RX 637 (ALT 250 FOR IP): Performed by: STUDENT IN AN ORGANIZED HEALTH CARE EDUCATION/TRAINING PROGRAM

## 2023-05-26 PROCEDURE — 87116 MYCOBACTERIA CULTURE: CPT

## 2023-05-26 PROCEDURE — 6360000002 HC RX W HCPCS: Performed by: STUDENT IN AN ORGANIZED HEALTH CARE EDUCATION/TRAINING PROGRAM

## 2023-05-26 PROCEDURE — 87102 FUNGUS ISOLATION CULTURE: CPT

## 2023-05-26 PROCEDURE — 90945 DIALYSIS ONE EVALUATION: CPT

## 2023-05-26 PROCEDURE — 99232 SBSQ HOSP IP/OBS MODERATE 35: CPT

## 2023-05-26 PROCEDURE — 87186 SC STD MICRODIL/AGAR DIL: CPT

## 2023-05-26 PROCEDURE — 99233 SBSQ HOSP IP/OBS HIGH 50: CPT | Performed by: INTERNAL MEDICINE

## 2023-05-26 PROCEDURE — 94640 AIRWAY INHALATION TREATMENT: CPT

## 2023-05-26 PROCEDURE — 2580000003 HC RX 258: Performed by: PODIATRIST

## 2023-05-26 PROCEDURE — 82330 ASSAY OF CALCIUM: CPT

## 2023-05-26 PROCEDURE — 2500000003 HC RX 250 WO HCPCS: Performed by: STUDENT IN AN ORGANIZED HEALTH CARE EDUCATION/TRAINING PROGRAM

## 2023-05-26 PROCEDURE — 87206 SMEAR FLUORESCENT/ACID STAI: CPT

## 2023-05-26 PROCEDURE — 2580000003 HC RX 258: Performed by: NURSE ANESTHETIST, CERTIFIED REGISTERED

## 2023-05-26 PROCEDURE — 6360000002 HC RX W HCPCS: Performed by: INTERNAL MEDICINE

## 2023-05-26 PROCEDURE — 80053 COMPREHEN METABOLIC PANEL: CPT

## 2023-05-26 PROCEDURE — 83735 ASSAY OF MAGNESIUM: CPT

## 2023-05-26 PROCEDURE — 87205 SMEAR GRAM STAIN: CPT

## 2023-05-26 PROCEDURE — 87075 CULTR BACTERIA EXCEPT BLOOD: CPT

## 2023-05-26 PROCEDURE — 2500000003 HC RX 250 WO HCPCS: Performed by: PODIATRIST

## 2023-05-26 PROCEDURE — 2000000000 HC ICU R&B

## 2023-05-26 PROCEDURE — 88311 DECALCIFY TISSUE: CPT

## 2023-05-26 PROCEDURE — 2580000003 HC RX 258: Performed by: INTERNAL MEDICINE

## 2023-05-26 PROCEDURE — 2709999900 HC NON-CHARGEABLE SUPPLY: Performed by: PODIATRIST

## 2023-05-26 PROCEDURE — 2700000000 HC OXYGEN THERAPY PER DAY

## 2023-05-26 PROCEDURE — 87077 CULTURE AEROBIC IDENTIFY: CPT

## 2023-05-26 PROCEDURE — 6360000002 HC RX W HCPCS: Performed by: NURSE ANESTHETIST, CERTIFIED REGISTERED

## 2023-05-26 PROCEDURE — 6370000000 HC RX 637 (ALT 250 FOR IP): Performed by: ANESTHESIOLOGY

## 2023-05-26 PROCEDURE — 7100000000 HC PACU RECOVERY - FIRST 15 MIN: Performed by: PODIATRIST

## 2023-05-26 PROCEDURE — 88305 TISSUE EXAM BY PATHOLOGIST: CPT

## 2023-05-26 PROCEDURE — 2580000003 HC RX 258: Performed by: STUDENT IN AN ORGANIZED HEALTH CARE EDUCATION/TRAINING PROGRAM

## 2023-05-26 PROCEDURE — 6370000000 HC RX 637 (ALT 250 FOR IP): Performed by: NURSE PRACTITIONER

## 2023-05-26 PROCEDURE — 2500000003 HC RX 250 WO HCPCS: Performed by: INTERNAL MEDICINE

## 2023-05-26 PROCEDURE — 3700000001 HC ADD 15 MINUTES (ANESTHESIA): Performed by: PODIATRIST

## 2023-05-26 PROCEDURE — 7100000001 HC PACU RECOVERY - ADDTL 15 MIN: Performed by: PODIATRIST

## 2023-05-26 PROCEDURE — 80202 ASSAY OF VANCOMYCIN: CPT

## 2023-05-26 PROCEDURE — 87070 CULTURE OTHR SPECIMN AEROBIC: CPT

## 2023-05-26 PROCEDURE — 3700000000 HC ANESTHESIA ATTENDED CARE: Performed by: PODIATRIST

## 2023-05-26 PROCEDURE — 6360000002 HC RX W HCPCS: Performed by: PODIATRIST

## 2023-05-26 PROCEDURE — 3600000015 HC SURGERY LEVEL 5 ADDTL 15MIN: Performed by: PODIATRIST

## 2023-05-26 PROCEDURE — 3600000005 HC SURGERY LEVEL 5 BASE: Performed by: PODIATRIST

## 2023-05-26 RX ORDER — FENTANYL CITRATE 50 UG/ML
INJECTION, SOLUTION INTRAMUSCULAR; INTRAVENOUS PRN
Status: DISCONTINUED | OUTPATIENT
Start: 2023-05-26 | End: 2023-05-26 | Stop reason: SDUPTHER

## 2023-05-26 RX ORDER — ROCURONIUM BROMIDE 10 MG/ML
INJECTION, SOLUTION INTRAVENOUS PRN
Status: DISCONTINUED | OUTPATIENT
Start: 2023-05-26 | End: 2023-05-26 | Stop reason: SDUPTHER

## 2023-05-26 RX ORDER — VANCOMYCIN HYDROCHLORIDE 1 G/20ML
INJECTION, POWDER, LYOPHILIZED, FOR SOLUTION INTRAVENOUS PRN
Status: DISCONTINUED | OUTPATIENT
Start: 2023-05-26 | End: 2023-05-26 | Stop reason: ALTCHOICE

## 2023-05-26 RX ORDER — OXYCODONE HYDROCHLORIDE 5 MG/1
10 TABLET ORAL PRN
Status: DISCONTINUED | OUTPATIENT
Start: 2023-05-26 | End: 2023-05-26 | Stop reason: HOSPADM

## 2023-05-26 RX ORDER — ONDANSETRON 2 MG/ML
4 INJECTION INTRAMUSCULAR; INTRAVENOUS EVERY 30 MIN PRN
Status: DISCONTINUED | OUTPATIENT
Start: 2023-05-26 | End: 2023-05-26 | Stop reason: HOSPADM

## 2023-05-26 RX ORDER — LIDOCAINE HYDROCHLORIDE 20 MG/ML
INJECTION, SOLUTION INFILTRATION; PERINEURAL PRN
Status: DISCONTINUED | OUTPATIENT
Start: 2023-05-26 | End: 2023-05-26 | Stop reason: SDUPTHER

## 2023-05-26 RX ORDER — HYDRALAZINE HYDROCHLORIDE 20 MG/ML
10 INJECTION INTRAMUSCULAR; INTRAVENOUS EVERY 6 HOURS PRN
Status: DISPENSED | OUTPATIENT
Start: 2023-05-26 | End: 2023-05-27

## 2023-05-26 RX ORDER — IPRATROPIUM BROMIDE AND ALBUTEROL SULFATE 2.5; .5 MG/3ML; MG/3ML
1 SOLUTION RESPIRATORY (INHALATION) ONCE
Status: COMPLETED | OUTPATIENT
Start: 2023-05-26 | End: 2023-05-26

## 2023-05-26 RX ORDER — SODIUM CHLORIDE 9 MG/ML
INJECTION, SOLUTION INTRAVENOUS CONTINUOUS PRN
Status: DISCONTINUED | OUTPATIENT
Start: 2023-05-26 | End: 2023-05-26 | Stop reason: SDUPTHER

## 2023-05-26 RX ORDER — DEXAMETHASONE SODIUM PHOSPHATE 10 MG/ML
INJECTION INTRAMUSCULAR; INTRAVENOUS PRN
Status: DISCONTINUED | OUTPATIENT
Start: 2023-05-26 | End: 2023-05-26 | Stop reason: SDUPTHER

## 2023-05-26 RX ORDER — MAGNESIUM HYDROXIDE 1200 MG/15ML
LIQUID ORAL CONTINUOUS PRN
Status: COMPLETED | OUTPATIENT
Start: 2023-05-26 | End: 2023-05-26

## 2023-05-26 RX ORDER — OXYCODONE HYDROCHLORIDE 5 MG/1
5 TABLET ORAL EVERY 4 HOURS PRN
Status: DISCONTINUED | OUTPATIENT
Start: 2023-05-26 | End: 2023-06-09 | Stop reason: HOSPADM

## 2023-05-26 RX ORDER — ONDANSETRON 2 MG/ML
INJECTION INTRAMUSCULAR; INTRAVENOUS PRN
Status: DISCONTINUED | OUTPATIENT
Start: 2023-05-26 | End: 2023-05-26 | Stop reason: SDUPTHER

## 2023-05-26 RX ORDER — KETAMINE HYDROCHLORIDE 50 MG/ML
INJECTION, SOLUTION, CONCENTRATE INTRAMUSCULAR; INTRAVENOUS PRN
Status: DISCONTINUED | OUTPATIENT
Start: 2023-05-26 | End: 2023-05-26 | Stop reason: SDUPTHER

## 2023-05-26 RX ORDER — ETOMIDATE 2 MG/ML
INJECTION INTRAVENOUS PRN
Status: DISCONTINUED | OUTPATIENT
Start: 2023-05-26 | End: 2023-05-26 | Stop reason: SDUPTHER

## 2023-05-26 RX ORDER — OXYCODONE HYDROCHLORIDE 5 MG/1
10 TABLET ORAL EVERY 4 HOURS PRN
Status: DISCONTINUED | OUTPATIENT
Start: 2023-05-26 | End: 2023-06-09 | Stop reason: HOSPADM

## 2023-05-26 RX ORDER — SODIUM CHLORIDE 9 MG/ML
INJECTION, SOLUTION INTRAVENOUS PRN
Status: DISCONTINUED | OUTPATIENT
Start: 2023-05-26 | End: 2023-05-26 | Stop reason: HOSPADM

## 2023-05-26 RX ORDER — SODIUM CHLORIDE 0.9 % (FLUSH) 0.9 %
5-40 SYRINGE (ML) INJECTION PRN
Status: DISCONTINUED | OUTPATIENT
Start: 2023-05-26 | End: 2023-05-26 | Stop reason: HOSPADM

## 2023-05-26 RX ORDER — SODIUM CHLORIDE 0.9 % (FLUSH) 0.9 %
5-40 SYRINGE (ML) INJECTION EVERY 12 HOURS SCHEDULED
Status: DISCONTINUED | OUTPATIENT
Start: 2023-05-26 | End: 2023-05-26 | Stop reason: HOSPADM

## 2023-05-26 RX ORDER — OXYCODONE HYDROCHLORIDE 5 MG/1
5 TABLET ORAL PRN
Status: DISCONTINUED | OUTPATIENT
Start: 2023-05-26 | End: 2023-05-26 | Stop reason: HOSPADM

## 2023-05-26 RX ADMIN — DEXAMETHASONE SODIUM PHOSPHATE 10 MG: 10 INJECTION INTRAMUSCULAR; INTRAVENOUS at 13:12

## 2023-05-26 RX ADMIN — FENTANYL CITRATE 25 MCG: 50 INJECTION, SOLUTION INTRAMUSCULAR; INTRAVENOUS at 13:19

## 2023-05-26 RX ADMIN — DULOXETINE HYDROCHLORIDE 60 MG: 60 CAPSULE, DELAYED RELEASE ORAL at 08:14

## 2023-05-26 RX ADMIN — SODIUM PHOSPHATE, MONOBASIC, MONOHYDRATE AND SODIUM PHOSPHATE, DIBASIC, ANHYDROUS 6 MMOL: 276; 142 INJECTION, SOLUTION INTRAVENOUS at 01:39

## 2023-05-26 RX ADMIN — OXYCODONE HYDROCHLORIDE 10 MG: 5 TABLET ORAL at 21:29

## 2023-05-26 RX ADMIN — CALCIUM GLUCONATE 1000 MG: 20 INJECTION, SOLUTION INTRAVENOUS at 00:42

## 2023-05-26 RX ADMIN — OXYCODONE HYDROCHLORIDE 10 MG: 5 TABLET ORAL at 01:51

## 2023-05-26 RX ADMIN — PANTOPRAZOLE SODIUM 40 MG: 40 TABLET, DELAYED RELEASE ORAL at 08:14

## 2023-05-26 RX ADMIN — TRAZODONE HYDROCHLORIDE 50 MG: 50 TABLET ORAL at 21:29

## 2023-05-26 RX ADMIN — OXYCODONE HYDROCHLORIDE 10 MG: 5 TABLET ORAL at 16:22

## 2023-05-26 RX ADMIN — HYDRALAZINE HYDROCHLORIDE 10 MG: 20 INJECTION INTRAMUSCULAR; INTRAVENOUS at 18:14

## 2023-05-26 RX ADMIN — ISOSORBIDE DINITRATE 20 MG: 10 TABLET ORAL at 08:14

## 2023-05-26 RX ADMIN — VANCOMYCIN HYDROCHLORIDE 1750 MG: 1 INJECTION, POWDER, LYOPHILIZED, FOR SOLUTION INTRAVENOUS at 08:19

## 2023-05-26 RX ADMIN — MUPIROCIN: 20 OINTMENT TOPICAL at 21:30

## 2023-05-26 RX ADMIN — ISOSORBIDE DINITRATE 20 MG: 10 TABLET ORAL at 21:29

## 2023-05-26 RX ADMIN — INSULIN LISPRO 2 UNITS: 100 INJECTION, SOLUTION INTRAVENOUS; SUBCUTANEOUS at 19:00

## 2023-05-26 RX ADMIN — SODIUM CHLORIDE, PRESERVATIVE FREE 10 ML: 5 INJECTION INTRAVENOUS at 10:02

## 2023-05-26 RX ADMIN — GABAPENTIN 100 MG: 100 CAPSULE ORAL at 21:29

## 2023-05-26 RX ADMIN — LIDOCAINE HYDROCHLORIDE 80 MG: 20 INJECTION, SOLUTION INFILTRATION; PERINEURAL at 13:08

## 2023-05-26 RX ADMIN — SODIUM CHLORIDE, PRESERVATIVE FREE 10 ML: 5 INJECTION INTRAVENOUS at 21:31

## 2023-05-26 RX ADMIN — IPRATROPIUM BROMIDE AND ALBUTEROL SULFATE 1 AMPULE: 2.5; .5 SOLUTION RESPIRATORY (INHALATION) at 11:22

## 2023-05-26 RX ADMIN — KETAMINE HYDROCHLORIDE 30 MG: 50 INJECTION INTRAMUSCULAR; INTRAVENOUS at 13:12

## 2023-05-26 RX ADMIN — CEFEPIME 2000 MG: 2 INJECTION, POWDER, FOR SOLUTION INTRAVENOUS at 05:40

## 2023-05-26 RX ADMIN — SODIUM CHLORIDE: 9 INJECTION, SOLUTION INTRAVENOUS at 13:05

## 2023-05-26 RX ADMIN — ETOMIDATE 20 MG: 2 INJECTION INTRAVENOUS at 13:08

## 2023-05-26 RX ADMIN — TORSEMIDE 100 MG: 100 TABLET ORAL at 08:14

## 2023-05-26 RX ADMIN — ROCURONIUM BROMIDE 50 MG: 10 SOLUTION INTRAVENOUS at 13:08

## 2023-05-26 RX ADMIN — SUGAMMADEX 200 MG: 100 INJECTION, SOLUTION INTRAVENOUS at 13:57

## 2023-05-26 RX ADMIN — HEPARIN SODIUM 5000 UNITS: 5000 INJECTION INTRAVENOUS; SUBCUTANEOUS at 21:29

## 2023-05-26 RX ADMIN — Medication 2000 ML/HR: at 01:20

## 2023-05-26 RX ADMIN — OXYCODONE HYDROCHLORIDE 10 MG: 5 TABLET ORAL at 08:13

## 2023-05-26 RX ADMIN — MUPIROCIN: 20 OINTMENT TOPICAL at 08:18

## 2023-05-26 RX ADMIN — Medication 1000 ML/HR: at 00:03

## 2023-05-26 RX ADMIN — GABAPENTIN 100 MG: 100 CAPSULE ORAL at 08:14

## 2023-05-26 RX ADMIN — Medication 500 ML/HR: at 04:30

## 2023-05-26 RX ADMIN — CALCIUM GLUCONATE 1000 MG: 20 INJECTION, SOLUTION INTRAVENOUS at 06:58

## 2023-05-26 RX ADMIN — Medication 1000 ML/HR: at 04:30

## 2023-05-26 RX ADMIN — CALCIUM ACETATE 667 MG: 667 CAPSULE ORAL at 18:58

## 2023-05-26 RX ADMIN — FENTANYL CITRATE 25 MCG: 50 INJECTION, SOLUTION INTRAMUSCULAR; INTRAVENOUS at 13:36

## 2023-05-26 RX ADMIN — ONDANSETRON 4 MG: 2 INJECTION INTRAMUSCULAR; INTRAVENOUS at 13:12

## 2023-05-26 RX ADMIN — Medication 2000 ML/HR: at 04:00

## 2023-05-26 ASSESSMENT — PAIN - FUNCTIONAL ASSESSMENT
PAIN_FUNCTIONAL_ASSESSMENT: PREVENTS OR INTERFERES WITH ALL ACTIVE AND SOME PASSIVE ACTIVITIES
PAIN_FUNCTIONAL_ASSESSMENT: PREVENTS OR INTERFERES SOME ACTIVE ACTIVITIES AND ADLS
PAIN_FUNCTIONAL_ASSESSMENT: INTOLERABLE, UNABLE TO DO ANY ACTIVE OR PASSIVE ACTIVITIES
PAIN_FUNCTIONAL_ASSESSMENT: ACTIVITIES ARE NOT PREVENTED

## 2023-05-26 ASSESSMENT — PAIN DESCRIPTION - DESCRIPTORS
DESCRIPTORS: ACHING;SHARP
DESCRIPTORS: ACHING;SHARP
DESCRIPTORS: ACHING;SHARP;SHOOTING
DESCRIPTORS: ACHING
DESCRIPTORS: ACHING;SHARP

## 2023-05-26 ASSESSMENT — PAIN DESCRIPTION - ONSET: ONSET: ON-GOING

## 2023-05-26 ASSESSMENT — PAIN SCALES - GENERAL
PAINLEVEL_OUTOF10: 10
PAINLEVEL_OUTOF10: 5
PAINLEVEL_OUTOF10: 0
PAINLEVEL_OUTOF10: 10
PAINLEVEL_OUTOF10: 0
PAINLEVEL_OUTOF10: 8
PAINLEVEL_OUTOF10: 8
PAINLEVEL_OUTOF10: 10
PAINLEVEL_OUTOF10: 10
PAINLEVEL_OUTOF10: 0
PAINLEVEL_OUTOF10: 0
PAINLEVEL_OUTOF10: 8

## 2023-05-26 ASSESSMENT — PAIN DESCRIPTION - ORIENTATION
ORIENTATION: MID;RIGHT
ORIENTATION: RIGHT
ORIENTATION: RIGHT
ORIENTATION: MID

## 2023-05-26 ASSESSMENT — PAIN DESCRIPTION - LOCATION
LOCATION: FOOT
LOCATION: BACK
LOCATION: BACK;FOOT

## 2023-05-26 ASSESSMENT — PAIN DESCRIPTION - FREQUENCY
FREQUENCY: CONTINUOUS

## 2023-05-26 ASSESSMENT — PAIN DESCRIPTION - PAIN TYPE
TYPE: ACUTE PAIN;CHRONIC PAIN
TYPE: ACUTE PAIN;CHRONIC PAIN

## 2023-05-26 NOTE — ANESTHESIA PRE PROCEDURE
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    Cellulitis and abscess of hand L03.119, L02.519    Uncontrolled type 2 diabetes mellitus with hyperglycemia ([facility]) E11.65    Acute encephalopathy G93.40    Acute hypoxemic respiratory failure ([facility]) J96.01    Acute CVA (cerebrovascular accident) ([facility] I63.9    Speech problem R47.9    Urinary tract infection with hematuria N39.0, R31.9    Respiratory failure with hypoxia ([facility]) J96.91    Acute respiratory failure with hypercapnia ([facility]) J96.02    Acute pulmonary edema ([facility]) J81.0    Grade II diastolic dysfunction U80.03    Shock circulatory ([facility]) R57.9    Smoker F17.200    Normocytic normochromic anemia D64.9    NSTEMI (non-ST elevated myocardial infarction) ([facility]) I21.4    SIRS (systemic inflammatory response syndrome) ([facility]) R65.10    Atrial flutter ([facility]) I48.92    [place]-rectal abscess K61.1    Somnolence R40.0    Pulmonary edema with diastolic CHF, NYHA class 3 ([facility]) I50.30    Respiratory failure ([facility]) J96.90    Former smoker Z87.891    Cardiomyopathy ([facility] I42.9    Morbid obesity with BMI of 40.0-44.9, adult ([facility]) E66.01, Z68.41    Hypoglycemia E16.2    Altered mental status R41.82    Hypertensive emergency I16.1    SOB (shortness of breath) R06.02    Acute respiratory failure with hypoxia and hypercapnia ([facility]) J96.01, J96.02    HFrEF (heart failure with reduced ejection fraction) ([facility]) I50.20    Pericardial effusion I31.39    Hypervolemia E87.70    Pneumonia of left lower lobe due to infectious organism J18.9    Acute on chronic respiratory failure with hypoxia ([facility]) J96.21    Compression atelectasis J98.11    Type 2 myocardial infarction ([facility] I21. A1    Acute respiratory failure with hypoxemia ([facility]) J96.01    Hyperkalemia E87.5    Hyponatremia V40.7    Metabolic acidemia A85.61    Pulmonary infiltrate R91.8    Leukocytosis D72.829    Hypertensive urgency I16.0    Severe

## 2023-05-26 NOTE — ANESTHESIA POSTPROCEDURE EVALUATION
Department of Anesthesiology  Postprocedure Note    Patient: Kathie Márquez  MRN: [id number]  YOB: 1970  Date of evaluation: 5/26/2023      Procedure Summary     Date: 05/26/23 Room / Location: [facility]    Anesthesia Start: 1306 Anesthesia Stop: 1572    Procedures:       INCISION AND DEBRIDEMENT RIGHT FOOT WITH (Right: Foot)      RIGHT FIFTH RAY AMPUTATION (Right: Toes) Diagnosis:       Peripheral vascular disease, unspecified ([facility]      (Peripheral vascular disease, unspecified ([facility] [I73.9])    Surgeons: Shobha Quiñones DPM Responsible Provider:     Anesthesia Type: general ASA Status: 4          Anesthesia Type: No value filed. Ernesto Phase I:      Ernesto Phase II:        Anesthesia Post Evaluation    Patient location during evaluation: PACU  Patient participation: complete - patient participated  Level of consciousness: awake and alert  Airway patency: patent  Nausea & Vomiting: no nausea and no vomiting  Complications: no  Cardiovascular status: blood pressure returned to baseline  Respiratory status: acceptable  Hydration status: euvolemic  Comments: VSS on transfer to phase 2 recovery. No anesthetic complications.

## 2023-05-27 PROBLEM — L08.9 WOUND INFECTION: Status: ACTIVE | Noted: 2023-05-27

## 2023-05-27 PROBLEM — T14.8XXA WOUND INFECTION: Status: ACTIVE | Noted: 2023-05-27

## 2023-05-27 LAB
ALBUMIN SERPL-MCNC: 3.2 G/DL (ref 3.4–5)
ANION GAP SERPL CALCULATED.3IONS-SCNC: 10 MMOL/L (ref 3–16)
BUN SERPL-MCNC: 34 MG/DL (ref 7–20)
CALCIUM SERPL-MCNC: 9.1 MG/DL (ref 8.3–10.6)
CHLORIDE SERPL-SCNC: 101 MMOL/L (ref 99–110)
CO2 SERPL-SCNC: 22 MMOL/L (ref 21–32)
CREAT SERPL-MCNC: 3.7 MG/DL (ref 0.9–1.3)
DEPRECATED RDW RBC AUTO: 17.7 % (ref 12.4–15.4)
GFR SERPLBLD CREATININE-BSD FMLA CKD-EPI: 18 ML/MIN/{1.73_M2}
GLUCOSE BLD-MCNC: 148 MG/DL (ref 70–99)
GLUCOSE BLD-MCNC: 190 MG/DL (ref 70–99)
GLUCOSE BLD-MCNC: 249 MG/DL (ref 70–99)
GLUCOSE SERPL-MCNC: 201 MG/DL (ref 70–99)
HCT VFR BLD AUTO: 24 % (ref 40.5–52.5)
HGB BLD-MCNC: 7.9 G/DL (ref 13.5–17.5)
MCH RBC QN AUTO: 29.8 PG (ref 26–34)
MCHC RBC AUTO-ENTMCNC: 33 G/DL (ref 31–36)
MCV RBC AUTO: 90.2 FL (ref 80–100)
PERFORMED ON: ABNORMAL
PHOSPHATE SERPL-MCNC: 3.5 MG/DL (ref 2.5–4.9)
PLATELET # BLD AUTO: 231 K/UL (ref 135–450)
PMV BLD AUTO: 6.9 FL (ref 5–10.5)
POTASSIUM SERPL-SCNC: 5.6 MMOL/L (ref 3.5–5.1)
RBC # BLD AUTO: 2.66 M/UL (ref 4.2–5.9)
SODIUM SERPL-SCNC: 133 MMOL/L (ref 136–145)
VANCOMYCIN SERPL-MCNC: 23.4 UG/ML
WBC # BLD AUTO: 7.3 K/UL (ref 4–11)

## 2023-05-27 PROCEDURE — 80069 RENAL FUNCTION PANEL: CPT

## 2023-05-27 PROCEDURE — 6370000000 HC RX 637 (ALT 250 FOR IP): Performed by: STUDENT IN AN ORGANIZED HEALTH CARE EDUCATION/TRAINING PROGRAM

## 2023-05-27 PROCEDURE — 6360000002 HC RX W HCPCS: Performed by: INTERNAL MEDICINE

## 2023-05-27 PROCEDURE — 90935 HEMODIALYSIS ONE EVALUATION: CPT

## 2023-05-27 PROCEDURE — 85027 COMPLETE CBC AUTOMATED: CPT

## 2023-05-27 PROCEDURE — 2000000000 HC ICU R&B

## 2023-05-27 PROCEDURE — 2580000003 HC RX 258: Performed by: INTERNAL MEDICINE

## 2023-05-27 PROCEDURE — 99233 SBSQ HOSP IP/OBS HIGH 50: CPT | Performed by: INTERNAL MEDICINE

## 2023-05-27 PROCEDURE — 6360000002 HC RX W HCPCS: Performed by: STUDENT IN AN ORGANIZED HEALTH CARE EDUCATION/TRAINING PROGRAM

## 2023-05-27 PROCEDURE — 94761 N-INVAS EAR/PLS OXIMETRY MLT: CPT

## 2023-05-27 PROCEDURE — 2700000000 HC OXYGEN THERAPY PER DAY

## 2023-05-27 PROCEDURE — 2500000003 HC RX 250 WO HCPCS: Performed by: STUDENT IN AN ORGANIZED HEALTH CARE EDUCATION/TRAINING PROGRAM

## 2023-05-27 PROCEDURE — 80202 ASSAY OF VANCOMYCIN: CPT

## 2023-05-27 PROCEDURE — 2580000003 HC RX 258: Performed by: STUDENT IN AN ORGANIZED HEALTH CARE EDUCATION/TRAINING PROGRAM

## 2023-05-27 PROCEDURE — 6370000000 HC RX 637 (ALT 250 FOR IP): Performed by: NURSE PRACTITIONER

## 2023-05-27 PROCEDURE — 36415 COLL VENOUS BLD VENIPUNCTURE: CPT

## 2023-05-27 RX ORDER — HYDROMORPHONE HYDROCHLORIDE 1 MG/ML
1 INJECTION, SOLUTION INTRAMUSCULAR; INTRAVENOUS; SUBCUTANEOUS EVERY 4 HOURS PRN
Status: DISCONTINUED | OUTPATIENT
Start: 2023-05-27 | End: 2023-05-28

## 2023-05-27 RX ORDER — HYDROMORPHONE HYDROCHLORIDE 1 MG/ML
0.5 INJECTION, SOLUTION INTRAMUSCULAR; INTRAVENOUS; SUBCUTANEOUS EVERY 4 HOURS PRN
Status: DISCONTINUED | OUTPATIENT
Start: 2023-05-27 | End: 2023-05-28

## 2023-05-27 RX ADMIN — PRAVASTATIN SODIUM 40 MG: 40 TABLET ORAL at 10:08

## 2023-05-27 RX ADMIN — GABAPENTIN 100 MG: 100 CAPSULE ORAL at 10:08

## 2023-05-27 RX ADMIN — CALCIUM ACETATE 667 MG: 667 CAPSULE ORAL at 17:36

## 2023-05-27 RX ADMIN — OXYCODONE HYDROCHLORIDE 10 MG: 5 TABLET ORAL at 12:10

## 2023-05-27 RX ADMIN — SODIUM CHLORIDE, PRESERVATIVE FREE 10 ML: 5 INJECTION INTRAVENOUS at 10:00

## 2023-05-27 RX ADMIN — HEPARIN SODIUM 5000 UNITS: 5000 INJECTION INTRAVENOUS; SUBCUTANEOUS at 21:29

## 2023-05-27 RX ADMIN — ISOSORBIDE DINITRATE 20 MG: 10 TABLET ORAL at 20:07

## 2023-05-27 RX ADMIN — HYDROMORPHONE HYDROCHLORIDE 1 MG: 1 INJECTION, SOLUTION INTRAMUSCULAR; INTRAVENOUS; SUBCUTANEOUS at 22:44

## 2023-05-27 RX ADMIN — SODIUM CHLORIDE, PRESERVATIVE FREE 10 ML: 5 INJECTION INTRAVENOUS at 20:08

## 2023-05-27 RX ADMIN — CEFEPIME 500 MG: 1 INJECTION, POWDER, FOR SOLUTION INTRAMUSCULAR; INTRAVENOUS at 05:49

## 2023-05-27 RX ADMIN — TRAZODONE HYDROCHLORIDE 50 MG: 50 TABLET ORAL at 21:30

## 2023-05-27 RX ADMIN — TORSEMIDE 100 MG: 100 TABLET ORAL at 10:08

## 2023-05-27 RX ADMIN — INSULIN LISPRO 2 UNITS: 100 INJECTION, SOLUTION INTRAVENOUS; SUBCUTANEOUS at 14:14

## 2023-05-27 RX ADMIN — MUPIROCIN: 20 OINTMENT TOPICAL at 20:07

## 2023-05-27 RX ADMIN — OXYCODONE HYDROCHLORIDE 10 MG: 5 TABLET ORAL at 21:30

## 2023-05-27 RX ADMIN — OXYCODONE HYDROCHLORIDE 10 MG: 5 TABLET ORAL at 17:36

## 2023-05-27 RX ADMIN — HEPARIN SODIUM 5000 UNITS: 5000 INJECTION INTRAVENOUS; SUBCUTANEOUS at 18:38

## 2023-05-27 RX ADMIN — PANTOPRAZOLE SODIUM 40 MG: 40 TABLET, DELAYED RELEASE ORAL at 05:48

## 2023-05-27 RX ADMIN — HYDROMORPHONE HYDROCHLORIDE 1 MG: 1 INJECTION, SOLUTION INTRAMUSCULAR; INTRAVENOUS; SUBCUTANEOUS at 14:05

## 2023-05-27 RX ADMIN — HEPARIN SODIUM 5000 UNITS: 5000 INJECTION INTRAVENOUS; SUBCUTANEOUS at 05:48

## 2023-05-27 RX ADMIN — GABAPENTIN 100 MG: 100 CAPSULE ORAL at 20:07

## 2023-05-27 RX ADMIN — GABAPENTIN 100 MG: 100 CAPSULE ORAL at 18:38

## 2023-05-27 RX ADMIN — HYDROMORPHONE HYDROCHLORIDE 1 MG: 1 INJECTION, SOLUTION INTRAMUSCULAR; INTRAVENOUS; SUBCUTANEOUS at 18:48

## 2023-05-27 RX ADMIN — CLOPIDOGREL BISULFATE 75 MG: 75 TABLET ORAL at 10:07

## 2023-05-27 RX ADMIN — ISOSORBIDE DINITRATE 20 MG: 10 TABLET ORAL at 18:38

## 2023-05-27 ASSESSMENT — PAIN SCALES - GENERAL
PAINLEVEL_OUTOF10: 8
PAINLEVEL_OUTOF10: 0
PAINLEVEL_OUTOF10: 10
PAINLEVEL_OUTOF10: 0
PAINLEVEL_OUTOF10: 10
PAINLEVEL_OUTOF10: 0
PAINLEVEL_OUTOF10: 8
PAINLEVEL_OUTOF10: 9
PAINLEVEL_OUTOF10: 0
PAINLEVEL_OUTOF10: 10
PAINLEVEL_OUTOF10: 10
PAINLEVEL_OUTOF10: 0

## 2023-05-27 ASSESSMENT — PAIN DESCRIPTION - DESCRIPTORS
DESCRIPTORS: THROBBING
DESCRIPTORS: THROBBING
DESCRIPTORS: THROBBING;ACHING
DESCRIPTORS: SHARP
DESCRIPTORS: THROBBING

## 2023-05-27 ASSESSMENT — PAIN DESCRIPTION - LOCATION
LOCATION: BACK
LOCATION: FOOT
LOCATION: BACK;HIP
LOCATION: FOOT

## 2023-05-27 ASSESSMENT — PAIN - FUNCTIONAL ASSESSMENT: PAIN_FUNCTIONAL_ASSESSMENT: ACTIVITIES ARE NOT PREVENTED

## 2023-05-27 ASSESSMENT — PAIN DESCRIPTION - ORIENTATION
ORIENTATION: LOWER
ORIENTATION: RIGHT

## 2023-05-27 ASSESSMENT — PAIN DESCRIPTION - ONSET: ONSET: ON-GOING

## 2023-05-27 ASSESSMENT — PAIN DESCRIPTION - FREQUENCY: FREQUENCY: CONTINUOUS

## 2023-05-27 ASSESSMENT — PAIN DESCRIPTION - PAIN TYPE: TYPE: CHRONIC PAIN

## 2023-05-28 LAB
GLUCOSE BLD-MCNC: 168 MG/DL (ref 70–99)
GLUCOSE BLD-MCNC: 178 MG/DL (ref 70–99)
GLUCOSE BLD-MCNC: 184 MG/DL (ref 70–99)
GLUCOSE BLD-MCNC: 215 MG/DL (ref 70–99)
PERFORMED ON: ABNORMAL

## 2023-05-28 PROCEDURE — 6360000002 HC RX W HCPCS: Performed by: INTERNAL MEDICINE

## 2023-05-28 PROCEDURE — 6370000000 HC RX 637 (ALT 250 FOR IP): Performed by: NURSE PRACTITIONER

## 2023-05-28 PROCEDURE — 2580000003 HC RX 258: Performed by: INTERNAL MEDICINE

## 2023-05-28 PROCEDURE — 6370000000 HC RX 637 (ALT 250 FOR IP): Performed by: STUDENT IN AN ORGANIZED HEALTH CARE EDUCATION/TRAINING PROGRAM

## 2023-05-28 PROCEDURE — 99233 SBSQ HOSP IP/OBS HIGH 50: CPT | Performed by: INTERNAL MEDICINE

## 2023-05-28 PROCEDURE — 6360000002 HC RX W HCPCS: Performed by: STUDENT IN AN ORGANIZED HEALTH CARE EDUCATION/TRAINING PROGRAM

## 2023-05-28 PROCEDURE — 2580000003 HC RX 258: Performed by: STUDENT IN AN ORGANIZED HEALTH CARE EDUCATION/TRAINING PROGRAM

## 2023-05-28 PROCEDURE — 2500000003 HC RX 250 WO HCPCS: Performed by: STUDENT IN AN ORGANIZED HEALTH CARE EDUCATION/TRAINING PROGRAM

## 2023-05-28 PROCEDURE — 94761 N-INVAS EAR/PLS OXIMETRY MLT: CPT

## 2023-05-28 PROCEDURE — 1200000000 HC SEMI PRIVATE

## 2023-05-28 PROCEDURE — 2700000000 HC OXYGEN THERAPY PER DAY

## 2023-05-28 PROCEDURE — 6360000002 HC RX W HCPCS: Performed by: NURSE PRACTITIONER

## 2023-05-28 RX ORDER — ONDANSETRON 2 MG/ML
4 INJECTION INTRAMUSCULAR; INTRAVENOUS EVERY 6 HOURS PRN
Status: DISCONTINUED | OUTPATIENT
Start: 2023-05-28 | End: 2023-06-09 | Stop reason: HOSPADM

## 2023-05-28 RX ADMIN — PANTOPRAZOLE SODIUM 40 MG: 40 TABLET, DELAYED RELEASE ORAL at 06:05

## 2023-05-28 RX ADMIN — PRAVASTATIN SODIUM 40 MG: 40 TABLET ORAL at 08:34

## 2023-05-28 RX ADMIN — CALCIUM ACETATE 667 MG: 667 CAPSULE ORAL at 12:02

## 2023-05-28 RX ADMIN — OXYCODONE HYDROCHLORIDE 10 MG: 5 TABLET ORAL at 06:04

## 2023-05-28 RX ADMIN — CEFEPIME 500 MG: 1 INJECTION, POWDER, FOR SOLUTION INTRAMUSCULAR; INTRAVENOUS at 06:24

## 2023-05-28 RX ADMIN — TRAZODONE HYDROCHLORIDE 50 MG: 50 TABLET ORAL at 20:44

## 2023-05-28 RX ADMIN — ONDANSETRON 4 MG: 2 INJECTION INTRAMUSCULAR; INTRAVENOUS at 20:44

## 2023-05-28 RX ADMIN — ISOSORBIDE DINITRATE 20 MG: 10 TABLET ORAL at 20:44

## 2023-05-28 RX ADMIN — INSULIN LISPRO 2 UNITS: 100 INJECTION, SOLUTION INTRAVENOUS; SUBCUTANEOUS at 08:28

## 2023-05-28 RX ADMIN — HEPARIN SODIUM 5000 UNITS: 5000 INJECTION INTRAVENOUS; SUBCUTANEOUS at 14:25

## 2023-05-28 RX ADMIN — HYDROMORPHONE HYDROCHLORIDE 1 MG: 1 INJECTION, SOLUTION INTRAMUSCULAR; INTRAVENOUS; SUBCUTANEOUS at 04:42

## 2023-05-28 RX ADMIN — CALCIUM ACETATE 667 MG: 667 CAPSULE ORAL at 08:34

## 2023-05-28 RX ADMIN — OXYCODONE HYDROCHLORIDE 10 MG: 5 TABLET ORAL at 10:01

## 2023-05-28 RX ADMIN — HYDROMORPHONE HYDROCHLORIDE 2 MG: 1 INJECTION, SOLUTION INTRAMUSCULAR; INTRAVENOUS; SUBCUTANEOUS at 20:45

## 2023-05-28 RX ADMIN — DULOXETINE HYDROCHLORIDE 60 MG: 60 CAPSULE, DELAYED RELEASE ORAL at 08:34

## 2023-05-28 RX ADMIN — CLOPIDOGREL BISULFATE 75 MG: 75 TABLET ORAL at 08:34

## 2023-05-28 RX ADMIN — CALCIUM CARBONATE 500 MG: 500 TABLET, CHEWABLE ORAL at 19:41

## 2023-05-28 RX ADMIN — OXYCODONE HYDROCHLORIDE 10 MG: 5 TABLET ORAL at 14:24

## 2023-05-28 RX ADMIN — ISOSORBIDE DINITRATE 20 MG: 10 TABLET ORAL at 08:34

## 2023-05-28 RX ADMIN — GABAPENTIN 100 MG: 100 CAPSULE ORAL at 14:24

## 2023-05-28 RX ADMIN — HYDROMORPHONE HYDROCHLORIDE 1 MG: 1 INJECTION, SOLUTION INTRAMUSCULAR; INTRAVENOUS; SUBCUTANEOUS at 16:17

## 2023-05-28 RX ADMIN — OXYCODONE HYDROCHLORIDE 10 MG: 5 TABLET ORAL at 18:26

## 2023-05-28 RX ADMIN — HYDROMORPHONE HYDROCHLORIDE 1 MG: 1 INJECTION, SOLUTION INTRAMUSCULAR; INTRAVENOUS; SUBCUTANEOUS at 08:24

## 2023-05-28 RX ADMIN — HEPARIN SODIUM 5000 UNITS: 5000 INJECTION INTRAVENOUS; SUBCUTANEOUS at 20:44

## 2023-05-28 RX ADMIN — TORSEMIDE 100 MG: 100 TABLET ORAL at 08:33

## 2023-05-28 RX ADMIN — ISOSORBIDE DINITRATE 20 MG: 10 TABLET ORAL at 14:24

## 2023-05-28 RX ADMIN — HEPARIN SODIUM 5000 UNITS: 5000 INJECTION INTRAVENOUS; SUBCUTANEOUS at 06:25

## 2023-05-28 RX ADMIN — GABAPENTIN 100 MG: 100 CAPSULE ORAL at 20:44

## 2023-05-28 RX ADMIN — SODIUM CHLORIDE, PRESERVATIVE FREE 10 ML: 5 INJECTION INTRAVENOUS at 08:33

## 2023-05-28 RX ADMIN — CALCIUM ACETATE 667 MG: 667 CAPSULE ORAL at 16:17

## 2023-05-28 RX ADMIN — SODIUM CHLORIDE, PRESERVATIVE FREE 10 ML: 5 INJECTION INTRAVENOUS at 20:53

## 2023-05-28 RX ADMIN — GABAPENTIN 100 MG: 100 CAPSULE ORAL at 08:34

## 2023-05-28 RX ADMIN — HYDROMORPHONE HYDROCHLORIDE 1 MG: 1 INJECTION, SOLUTION INTRAMUSCULAR; INTRAVENOUS; SUBCUTANEOUS at 12:02

## 2023-05-28 RX ADMIN — OXYCODONE HYDROCHLORIDE 10 MG: 5 TABLET ORAL at 23:23

## 2023-05-28 ASSESSMENT — PAIN DESCRIPTION - DESCRIPTORS
DESCRIPTORS: THROBBING

## 2023-05-28 ASSESSMENT — PAIN SCALES - GENERAL
PAINLEVEL_OUTOF10: 10
PAINLEVEL_OUTOF10: 0
PAINLEVEL_OUTOF10: 9
PAINLEVEL_OUTOF10: 10
PAINLEVEL_OUTOF10: 7
PAINLEVEL_OUTOF10: 10
PAINLEVEL_OUTOF10: 9
PAINLEVEL_OUTOF10: 10
PAINLEVEL_OUTOF10: 10

## 2023-05-28 ASSESSMENT — PAIN DESCRIPTION - LOCATION
LOCATION: FOOT

## 2023-05-28 ASSESSMENT — PAIN DESCRIPTION - ORIENTATION
ORIENTATION: RIGHT

## 2023-05-28 ASSESSMENT — PAIN DESCRIPTION - ONSET: ONSET: ON-GOING

## 2023-05-28 ASSESSMENT — PAIN DESCRIPTION - FREQUENCY
FREQUENCY: CONTINUOUS
FREQUENCY: CONTINUOUS

## 2023-05-29 LAB
ALBUMIN SERPL-MCNC: 3.2 G/DL (ref 3.4–5)
ANION GAP SERPL CALCULATED.3IONS-SCNC: 15 MMOL/L (ref 3–16)
BUN SERPL-MCNC: 45 MG/DL (ref 7–20)
CALCIUM SERPL-MCNC: 8.9 MG/DL (ref 8.3–10.6)
CHLORIDE SERPL-SCNC: 93 MMOL/L (ref 99–110)
CO2 SERPL-SCNC: 24 MMOL/L (ref 21–32)
CREAT SERPL-MCNC: 4.8 MG/DL (ref 0.9–1.3)
DEPRECATED RDW RBC AUTO: 17.3 % (ref 12.4–15.4)
GFR SERPLBLD CREATININE-BSD FMLA CKD-EPI: 14 ML/MIN/{1.73_M2}
GLUCOSE BLD-MCNC: 140 MG/DL (ref 70–99)
GLUCOSE BLD-MCNC: 162 MG/DL (ref 70–99)
GLUCOSE BLD-MCNC: 202 MG/DL (ref 70–99)
GLUCOSE BLD-MCNC: 252 MG/DL (ref 70–99)
GLUCOSE SERPL-MCNC: 166 MG/DL (ref 70–99)
HCT VFR BLD AUTO: 27.1 % (ref 40.5–52.5)
HGB BLD-MCNC: 8.8 G/DL (ref 13.5–17.5)
MCH RBC QN AUTO: 29.5 PG (ref 26–34)
MCHC RBC AUTO-ENTMCNC: 32.3 G/DL (ref 31–36)
MCV RBC AUTO: 91.3 FL (ref 80–100)
PERFORMED ON: ABNORMAL
PHOSPHATE SERPL-MCNC: 4.3 MG/DL (ref 2.5–4.9)
PLATELET # BLD AUTO: 323 K/UL (ref 135–450)
PMV BLD AUTO: 7 FL (ref 5–10.5)
POTASSIUM SERPL-SCNC: 5.2 MMOL/L (ref 3.5–5.1)
RBC # BLD AUTO: 2.97 M/UL (ref 4.2–5.9)
SODIUM SERPL-SCNC: 132 MMOL/L (ref 136–145)
WBC # BLD AUTO: 9.1 K/UL (ref 4–11)

## 2023-05-29 PROCEDURE — 80069 RENAL FUNCTION PANEL: CPT

## 2023-05-29 PROCEDURE — 94761 N-INVAS EAR/PLS OXIMETRY MLT: CPT

## 2023-05-29 PROCEDURE — 2580000003 HC RX 258: Performed by: STUDENT IN AN ORGANIZED HEALTH CARE EDUCATION/TRAINING PROGRAM

## 2023-05-29 PROCEDURE — 6360000002 HC RX W HCPCS: Performed by: INTERNAL MEDICINE

## 2023-05-29 PROCEDURE — 90935 HEMODIALYSIS ONE EVALUATION: CPT

## 2023-05-29 PROCEDURE — 85027 COMPLETE CBC AUTOMATED: CPT

## 2023-05-29 PROCEDURE — 36415 COLL VENOUS BLD VENIPUNCTURE: CPT

## 2023-05-29 PROCEDURE — 2700000000 HC OXYGEN THERAPY PER DAY

## 2023-05-29 PROCEDURE — 6370000000 HC RX 637 (ALT 250 FOR IP): Performed by: NURSE PRACTITIONER

## 2023-05-29 PROCEDURE — 6360000002 HC RX W HCPCS: Performed by: NURSE PRACTITIONER

## 2023-05-29 PROCEDURE — 6370000000 HC RX 637 (ALT 250 FOR IP): Performed by: STUDENT IN AN ORGANIZED HEALTH CARE EDUCATION/TRAINING PROGRAM

## 2023-05-29 PROCEDURE — 2500000003 HC RX 250 WO HCPCS: Performed by: STUDENT IN AN ORGANIZED HEALTH CARE EDUCATION/TRAINING PROGRAM

## 2023-05-29 PROCEDURE — 6360000002 HC RX W HCPCS: Performed by: STUDENT IN AN ORGANIZED HEALTH CARE EDUCATION/TRAINING PROGRAM

## 2023-05-29 PROCEDURE — 1200000000 HC SEMI PRIVATE

## 2023-05-29 RX ADMIN — HYDROMORPHONE HYDROCHLORIDE 2 MG: 1 INJECTION, SOLUTION INTRAMUSCULAR; INTRAVENOUS; SUBCUTANEOUS at 16:55

## 2023-05-29 RX ADMIN — HYDROMORPHONE HYDROCHLORIDE 2 MG: 1 INJECTION, SOLUTION INTRAMUSCULAR; INTRAVENOUS; SUBCUTANEOUS at 21:03

## 2023-05-29 RX ADMIN — TRAZODONE HYDROCHLORIDE 50 MG: 50 TABLET ORAL at 21:01

## 2023-05-29 RX ADMIN — ISOSORBIDE DINITRATE 20 MG: 10 TABLET ORAL at 21:01

## 2023-05-29 RX ADMIN — HYDROMORPHONE HYDROCHLORIDE 2 MG: 1 INJECTION, SOLUTION INTRAMUSCULAR; INTRAVENOUS; SUBCUTANEOUS at 06:18

## 2023-05-29 RX ADMIN — TORSEMIDE 100 MG: 100 TABLET ORAL at 15:05

## 2023-05-29 RX ADMIN — ONDANSETRON 4 MG: 2 INJECTION INTRAMUSCULAR; INTRAVENOUS at 03:22

## 2023-05-29 RX ADMIN — OXYCODONE HYDROCHLORIDE 10 MG: 5 TABLET ORAL at 03:21

## 2023-05-29 RX ADMIN — CLOPIDOGREL BISULFATE 75 MG: 75 TABLET ORAL at 15:05

## 2023-05-29 RX ADMIN — HYDROMORPHONE HYDROCHLORIDE 2 MG: 1 INJECTION, SOLUTION INTRAMUSCULAR; INTRAVENOUS; SUBCUTANEOUS at 10:18

## 2023-05-29 RX ADMIN — SODIUM CHLORIDE, PRESERVATIVE FREE 10 ML: 5 INJECTION INTRAVENOUS at 15:07

## 2023-05-29 RX ADMIN — CALCIUM ACETATE 667 MG: 667 CAPSULE ORAL at 16:55

## 2023-05-29 RX ADMIN — HYDROMORPHONE HYDROCHLORIDE 2 MG: 1 INJECTION, SOLUTION INTRAMUSCULAR; INTRAVENOUS; SUBCUTANEOUS at 01:33

## 2023-05-29 RX ADMIN — OXYCODONE HYDROCHLORIDE 10 MG: 5 TABLET ORAL at 15:05

## 2023-05-29 RX ADMIN — SODIUM CHLORIDE, PRESERVATIVE FREE 10 ML: 5 INJECTION INTRAVENOUS at 21:04

## 2023-05-29 RX ADMIN — HEPARIN SODIUM 5000 UNITS: 5000 INJECTION INTRAVENOUS; SUBCUTANEOUS at 21:02

## 2023-05-29 RX ADMIN — PRAVASTATIN SODIUM 40 MG: 40 TABLET ORAL at 15:05

## 2023-05-29 RX ADMIN — DULOXETINE HYDROCHLORIDE 60 MG: 60 CAPSULE, DELAYED RELEASE ORAL at 15:05

## 2023-05-29 RX ADMIN — Medication 10 ML: at 06:12

## 2023-05-29 RX ADMIN — OXYCODONE HYDROCHLORIDE 10 MG: 5 TABLET ORAL at 09:08

## 2023-05-29 RX ADMIN — GABAPENTIN 100 MG: 100 CAPSULE ORAL at 15:05

## 2023-05-29 RX ADMIN — HEPARIN SODIUM 5000 UNITS: 5000 INJECTION INTRAVENOUS; SUBCUTANEOUS at 15:07

## 2023-05-29 RX ADMIN — GABAPENTIN 100 MG: 100 CAPSULE ORAL at 21:01

## 2023-05-29 RX ADMIN — OXYCODONE HYDROCHLORIDE 10 MG: 5 TABLET ORAL at 23:11

## 2023-05-29 RX ADMIN — EPOETIN ALFA-EPBX 10000 UNITS: 10000 INJECTION, SOLUTION INTRAVENOUS; SUBCUTANEOUS at 21:03

## 2023-05-29 RX ADMIN — HEPARIN SODIUM 5000 UNITS: 5000 INJECTION INTRAVENOUS; SUBCUTANEOUS at 06:11

## 2023-05-29 RX ADMIN — ISOSORBIDE DINITRATE 20 MG: 10 TABLET ORAL at 15:05

## 2023-05-29 ASSESSMENT — PAIN SCALES - GENERAL
PAINLEVEL_OUTOF10: 7
PAINLEVEL_OUTOF10: 10
PAINLEVEL_OUTOF10: 8
PAINLEVEL_OUTOF10: 10

## 2023-05-29 ASSESSMENT — PAIN DESCRIPTION - LOCATION
LOCATION: FOOT
LOCATION: LEG
LOCATION: FOOT
LOCATION: FOOT
LOCATION: BUTTOCKS;LEG
LOCATION: FOOT
LOCATION: FOOT
LOCATION: BUTTOCKS

## 2023-05-29 ASSESSMENT — PAIN DESCRIPTION - ORIENTATION
ORIENTATION: RIGHT

## 2023-05-30 LAB
ALBUMIN SERPL-MCNC: 3.4 G/DL (ref 3.4–5)
ANION GAP SERPL CALCULATED.3IONS-SCNC: 13 MMOL/L (ref 3–16)
BUN SERPL-MCNC: 37 MG/DL (ref 7–20)
CALCIUM SERPL-MCNC: 9.4 MG/DL (ref 8.3–10.6)
CHLORIDE SERPL-SCNC: 95 MMOL/L (ref 99–110)
CO2 SERPL-SCNC: 25 MMOL/L (ref 21–32)
CREAT SERPL-MCNC: 4.6 MG/DL (ref 0.9–1.3)
DEPRECATED RDW RBC AUTO: 17.1 % (ref 12.4–15.4)
GFR SERPLBLD CREATININE-BSD FMLA CKD-EPI: 14 ML/MIN/{1.73_M2}
GLUCOSE BLD-MCNC: 195 MG/DL (ref 70–99)
GLUCOSE BLD-MCNC: 219 MG/DL (ref 70–99)
GLUCOSE BLD-MCNC: 234 MG/DL (ref 70–99)
GLUCOSE BLD-MCNC: 279 MG/DL (ref 70–99)
GLUCOSE SERPL-MCNC: 202 MG/DL (ref 70–99)
HCT VFR BLD AUTO: 26.8 % (ref 40.5–52.5)
HGB BLD-MCNC: 8.7 G/DL (ref 13.5–17.5)
MCH RBC QN AUTO: 29.6 PG (ref 26–34)
MCHC RBC AUTO-ENTMCNC: 32.5 G/DL (ref 31–36)
MCV RBC AUTO: 90.9 FL (ref 80–100)
PERFORMED ON: ABNORMAL
PHOSPHATE SERPL-MCNC: 4.4 MG/DL (ref 2.5–4.9)
PLATELET # BLD AUTO: 338 K/UL (ref 135–450)
PMV BLD AUTO: 7.2 FL (ref 5–10.5)
POTASSIUM SERPL-SCNC: 4.8 MMOL/L (ref 3.5–5.1)
RBC # BLD AUTO: 2.95 M/UL (ref 4.2–5.9)
SODIUM SERPL-SCNC: 133 MMOL/L (ref 136–145)
WBC # BLD AUTO: 8 K/UL (ref 4–11)

## 2023-05-30 PROCEDURE — 6370000000 HC RX 637 (ALT 250 FOR IP): Performed by: STUDENT IN AN ORGANIZED HEALTH CARE EDUCATION/TRAINING PROGRAM

## 2023-05-30 PROCEDURE — 85027 COMPLETE CBC AUTOMATED: CPT

## 2023-05-30 PROCEDURE — 6360000002 HC RX W HCPCS: Performed by: STUDENT IN AN ORGANIZED HEALTH CARE EDUCATION/TRAINING PROGRAM

## 2023-05-30 PROCEDURE — 97166 OT EVAL MOD COMPLEX 45 MIN: CPT

## 2023-05-30 PROCEDURE — 6360000002 HC RX W HCPCS: Performed by: INTERNAL MEDICINE

## 2023-05-30 PROCEDURE — 94761 N-INVAS EAR/PLS OXIMETRY MLT: CPT

## 2023-05-30 PROCEDURE — 2580000003 HC RX 258: Performed by: STUDENT IN AN ORGANIZED HEALTH CARE EDUCATION/TRAINING PROGRAM

## 2023-05-30 PROCEDURE — 99233 SBSQ HOSP IP/OBS HIGH 50: CPT | Performed by: NURSE PRACTITIONER

## 2023-05-30 PROCEDURE — 6370000000 HC RX 637 (ALT 250 FOR IP): Performed by: NURSE PRACTITIONER

## 2023-05-30 PROCEDURE — 2500000003 HC RX 250 WO HCPCS: Performed by: STUDENT IN AN ORGANIZED HEALTH CARE EDUCATION/TRAINING PROGRAM

## 2023-05-30 PROCEDURE — 2700000000 HC OXYGEN THERAPY PER DAY

## 2023-05-30 PROCEDURE — 1200000000 HC SEMI PRIVATE

## 2023-05-30 PROCEDURE — 36415 COLL VENOUS BLD VENIPUNCTURE: CPT

## 2023-05-30 PROCEDURE — 80069 RENAL FUNCTION PANEL: CPT

## 2023-05-30 PROCEDURE — 97162 PT EVAL MOD COMPLEX 30 MIN: CPT

## 2023-05-30 PROCEDURE — 97530 THERAPEUTIC ACTIVITIES: CPT

## 2023-05-30 PROCEDURE — 6360000002 HC RX W HCPCS: Performed by: NURSE PRACTITIONER

## 2023-05-30 RX ORDER — METOPROLOL SUCCINATE 25 MG/1
25 TABLET, EXTENDED RELEASE ORAL DAILY
Status: DISCONTINUED | OUTPATIENT
Start: 2023-05-30 | End: 2023-06-09 | Stop reason: HOSPADM

## 2023-05-30 RX ADMIN — CALCIUM ACETATE 667 MG: 667 CAPSULE ORAL at 09:02

## 2023-05-30 RX ADMIN — OXYCODONE HYDROCHLORIDE 10 MG: 5 TABLET ORAL at 14:11

## 2023-05-30 RX ADMIN — OXYCODONE HYDROCHLORIDE 10 MG: 5 TABLET ORAL at 04:23

## 2023-05-30 RX ADMIN — ISOSORBIDE DINITRATE 20 MG: 10 TABLET ORAL at 09:02

## 2023-05-30 RX ADMIN — HYDROMORPHONE HYDROCHLORIDE 2 MG: 1 INJECTION, SOLUTION INTRAMUSCULAR; INTRAVENOUS; SUBCUTANEOUS at 10:52

## 2023-05-30 RX ADMIN — PRAVASTATIN SODIUM 40 MG: 40 TABLET ORAL at 09:02

## 2023-05-30 RX ADMIN — HEPARIN SODIUM 5000 UNITS: 5000 INJECTION INTRAVENOUS; SUBCUTANEOUS at 14:11

## 2023-05-30 RX ADMIN — ONDANSETRON 4 MG: 2 INJECTION INTRAMUSCULAR; INTRAVENOUS at 06:36

## 2023-05-30 RX ADMIN — GABAPENTIN 100 MG: 100 CAPSULE ORAL at 20:05

## 2023-05-30 RX ADMIN — TRAZODONE HYDROCHLORIDE 50 MG: 50 TABLET ORAL at 20:05

## 2023-05-30 RX ADMIN — APIXABAN 5 MG: 5 TABLET, FILM COATED ORAL at 20:06

## 2023-05-30 RX ADMIN — ISOSORBIDE DINITRATE 20 MG: 10 TABLET ORAL at 14:11

## 2023-05-30 RX ADMIN — SODIUM CHLORIDE, PRESERVATIVE FREE 10 ML: 5 INJECTION INTRAVENOUS at 20:06

## 2023-05-30 RX ADMIN — CALCIUM ACETATE 667 MG: 667 CAPSULE ORAL at 12:25

## 2023-05-30 RX ADMIN — HYDROMORPHONE HYDROCHLORIDE 2 MG: 1 INJECTION, SOLUTION INTRAMUSCULAR; INTRAVENOUS; SUBCUTANEOUS at 06:36

## 2023-05-30 RX ADMIN — GABAPENTIN 100 MG: 100 CAPSULE ORAL at 09:02

## 2023-05-30 RX ADMIN — HYDROMORPHONE HYDROCHLORIDE 2 MG: 1 INJECTION, SOLUTION INTRAMUSCULAR; INTRAVENOUS; SUBCUTANEOUS at 01:06

## 2023-05-30 RX ADMIN — ISOSORBIDE DINITRATE 20 MG: 10 TABLET ORAL at 20:06

## 2023-05-30 RX ADMIN — SODIUM CHLORIDE, PRESERVATIVE FREE 10 ML: 5 INJECTION INTRAVENOUS at 09:03

## 2023-05-30 RX ADMIN — DULOXETINE HYDROCHLORIDE 60 MG: 60 CAPSULE, DELAYED RELEASE ORAL at 09:02

## 2023-05-30 RX ADMIN — OXYCODONE HYDROCHLORIDE 10 MG: 5 TABLET ORAL at 09:02

## 2023-05-30 RX ADMIN — OXYCODONE HYDROCHLORIDE 10 MG: 5 TABLET ORAL at 20:05

## 2023-05-30 RX ADMIN — CLOPIDOGREL BISULFATE 75 MG: 75 TABLET ORAL at 09:02

## 2023-05-30 RX ADMIN — PANTOPRAZOLE SODIUM 40 MG: 40 TABLET, DELAYED RELEASE ORAL at 05:21

## 2023-05-30 RX ADMIN — GABAPENTIN 100 MG: 100 CAPSULE ORAL at 14:11

## 2023-05-30 RX ADMIN — CALCIUM ACETATE 667 MG: 667 CAPSULE ORAL at 17:33

## 2023-05-30 RX ADMIN — TORSEMIDE 100 MG: 100 TABLET ORAL at 09:02

## 2023-05-30 RX ADMIN — METOPROLOL SUCCINATE 25 MG: 25 TABLET, EXTENDED RELEASE ORAL at 15:03

## 2023-05-30 RX ADMIN — HEPARIN SODIUM 5000 UNITS: 5000 INJECTION INTRAVENOUS; SUBCUTANEOUS at 05:30

## 2023-05-30 ASSESSMENT — PAIN SCALES - GENERAL
PAINLEVEL_OUTOF10: 10
PAINLEVEL_OUTOF10: 9
PAINLEVEL_OUTOF10: 8
PAINLEVEL_OUTOF10: 7
PAINLEVEL_OUTOF10: 9

## 2023-05-30 ASSESSMENT — PAIN DESCRIPTION - LOCATION
LOCATION: FOOT
LOCATION: FOOT
LOCATION: LEG
LOCATION: FOOT

## 2023-05-30 ASSESSMENT — PAIN DESCRIPTION - DESCRIPTORS
DESCRIPTORS: THROBBING

## 2023-05-30 ASSESSMENT — PAIN DESCRIPTION - ORIENTATION
ORIENTATION: RIGHT

## 2023-05-30 ASSESSMENT — PAIN DESCRIPTION - PAIN TYPE: TYPE: CHRONIC PAIN;ACUTE PAIN

## 2023-05-30 ASSESSMENT — PAIN DESCRIPTION - FREQUENCY: FREQUENCY: CONTINUOUS

## 2023-05-30 ASSESSMENT — PAIN - FUNCTIONAL ASSESSMENT: PAIN_FUNCTIONAL_ASSESSMENT: ACTIVITIES ARE NOT PREVENTED

## 2023-05-30 ASSESSMENT — PAIN DESCRIPTION - ONSET: ONSET: ON-GOING

## 2023-05-31 LAB
BACTERIA SPEC AEROBE CULT: ABNORMAL
BACTERIA SPEC ANAEROBE CULT: ABNORMAL
GLUCOSE BLD-MCNC: 129 MG/DL (ref 70–99)
GLUCOSE BLD-MCNC: 265 MG/DL (ref 70–99)
GLUCOSE BLD-MCNC: 268 MG/DL (ref 70–99)
GRAM STN SPEC: ABNORMAL
ORGANISM: ABNORMAL
PERFORMED ON: ABNORMAL

## 2023-05-31 PROCEDURE — 6370000000 HC RX 637 (ALT 250 FOR IP): Performed by: STUDENT IN AN ORGANIZED HEALTH CARE EDUCATION/TRAINING PROGRAM

## 2023-05-31 PROCEDURE — 97530 THERAPEUTIC ACTIVITIES: CPT

## 2023-05-31 PROCEDURE — 94761 N-INVAS EAR/PLS OXIMETRY MLT: CPT

## 2023-05-31 PROCEDURE — 6370000000 HC RX 637 (ALT 250 FOR IP): Performed by: NURSE PRACTITIONER

## 2023-05-31 PROCEDURE — 6360000002 HC RX W HCPCS

## 2023-05-31 PROCEDURE — 1200000000 HC SEMI PRIVATE

## 2023-05-31 PROCEDURE — 2580000003 HC RX 258: Performed by: STUDENT IN AN ORGANIZED HEALTH CARE EDUCATION/TRAINING PROGRAM

## 2023-05-31 PROCEDURE — 97110 THERAPEUTIC EXERCISES: CPT

## 2023-05-31 PROCEDURE — 90935 HEMODIALYSIS ONE EVALUATION: CPT

## 2023-05-31 PROCEDURE — 99232 SBSQ HOSP IP/OBS MODERATE 35: CPT | Performed by: NURSE PRACTITIONER

## 2023-05-31 PROCEDURE — 2500000003 HC RX 250 WO HCPCS: Performed by: STUDENT IN AN ORGANIZED HEALTH CARE EDUCATION/TRAINING PROGRAM

## 2023-05-31 PROCEDURE — 6360000002 HC RX W HCPCS: Performed by: INTERNAL MEDICINE

## 2023-05-31 PROCEDURE — 2700000000 HC OXYGEN THERAPY PER DAY

## 2023-05-31 RX ORDER — HEPARIN SODIUM 1000 [USP'U]/ML
INJECTION, SOLUTION INTRAVENOUS; SUBCUTANEOUS
Status: COMPLETED
Start: 2023-05-31 | End: 2023-05-31

## 2023-05-31 RX ORDER — HEPARIN SODIUM 1000 [USP'U]/ML
3600 INJECTION, SOLUTION INTRAVENOUS; SUBCUTANEOUS PRN
Status: DISCONTINUED | OUTPATIENT
Start: 2023-05-31 | End: 2023-06-09 | Stop reason: HOSPADM

## 2023-05-31 RX ADMIN — EPOETIN ALFA-EPBX 10000 UNITS: 10000 INJECTION, SOLUTION INTRAVENOUS; SUBCUTANEOUS at 09:02

## 2023-05-31 RX ADMIN — APIXABAN 5 MG: 5 TABLET, FILM COATED ORAL at 19:49

## 2023-05-31 RX ADMIN — HEPARIN SODIUM 3600 UNITS: 1000 INJECTION INTRAVENOUS; SUBCUTANEOUS at 09:03

## 2023-05-31 RX ADMIN — OXYCODONE HYDROCHLORIDE 10 MG: 5 TABLET ORAL at 19:49

## 2023-05-31 RX ADMIN — GABAPENTIN 100 MG: 100 CAPSULE ORAL at 19:49

## 2023-05-31 RX ADMIN — OXYCODONE HYDROCHLORIDE 10 MG: 5 TABLET ORAL at 09:19

## 2023-05-31 RX ADMIN — DULOXETINE HYDROCHLORIDE 60 MG: 60 CAPSULE, DELAYED RELEASE ORAL at 12:12

## 2023-05-31 RX ADMIN — HYDROMORPHONE HYDROCHLORIDE 2 MG: 1 INJECTION, SOLUTION INTRAMUSCULAR; INTRAVENOUS; SUBCUTANEOUS at 12:14

## 2023-05-31 RX ADMIN — TRAZODONE HYDROCHLORIDE 50 MG: 50 TABLET ORAL at 21:45

## 2023-05-31 RX ADMIN — HYDROMORPHONE HYDROCHLORIDE 2 MG: 1 INJECTION, SOLUTION INTRAMUSCULAR; INTRAVENOUS; SUBCUTANEOUS at 18:29

## 2023-05-31 RX ADMIN — ISOSORBIDE DINITRATE 20 MG: 10 TABLET ORAL at 19:49

## 2023-05-31 RX ADMIN — SODIUM CHLORIDE, PRESERVATIVE FREE 10 ML: 5 INJECTION INTRAVENOUS at 19:50

## 2023-05-31 RX ADMIN — HYDROMORPHONE HYDROCHLORIDE 2 MG: 1 INJECTION, SOLUTION INTRAMUSCULAR; INTRAVENOUS; SUBCUTANEOUS at 05:48

## 2023-05-31 RX ADMIN — GABAPENTIN 100 MG: 100 CAPSULE ORAL at 13:29

## 2023-05-31 RX ADMIN — PANTOPRAZOLE SODIUM 40 MG: 40 TABLET, DELAYED RELEASE ORAL at 05:52

## 2023-05-31 RX ADMIN — HYDROMORPHONE HYDROCHLORIDE 2 MG: 1 INJECTION, SOLUTION INTRAMUSCULAR; INTRAVENOUS; SUBCUTANEOUS at 01:39

## 2023-05-31 RX ADMIN — PRAVASTATIN SODIUM 40 MG: 40 TABLET ORAL at 12:11

## 2023-05-31 RX ADMIN — CALCIUM ACETATE 667 MG: 667 CAPSULE ORAL at 18:28

## 2023-05-31 RX ADMIN — OXYCODONE HYDROCHLORIDE 10 MG: 5 TABLET ORAL at 13:32

## 2023-05-31 RX ADMIN — SODIUM CHLORIDE, PRESERVATIVE FREE 10 ML: 5 INJECTION INTRAVENOUS at 12:15

## 2023-05-31 RX ADMIN — CALCIUM ACETATE 667 MG: 667 CAPSULE ORAL at 12:12

## 2023-05-31 RX ADMIN — CLOPIDOGREL BISULFATE 75 MG: 75 TABLET ORAL at 12:12

## 2023-05-31 RX ADMIN — ISOSORBIDE DINITRATE 20 MG: 10 TABLET ORAL at 13:29

## 2023-05-31 RX ADMIN — TORSEMIDE 100 MG: 100 TABLET ORAL at 12:12

## 2023-05-31 RX ADMIN — APIXABAN 5 MG: 5 TABLET, FILM COATED ORAL at 12:11

## 2023-05-31 RX ADMIN — METOPROLOL SUCCINATE 25 MG: 25 TABLET, EXTENDED RELEASE ORAL at 12:24

## 2023-05-31 ASSESSMENT — PAIN SCALES - GENERAL
PAINLEVEL_OUTOF10: 10

## 2023-05-31 ASSESSMENT — PAIN DESCRIPTION - ORIENTATION
ORIENTATION: RIGHT

## 2023-05-31 ASSESSMENT — PAIN DESCRIPTION - LOCATION
LOCATION: LEG
LOCATION: FOOT
LOCATION: LEG
LOCATION: FOOT

## 2023-05-31 ASSESSMENT — PAIN DESCRIPTION - DESCRIPTORS: DESCRIPTORS: ACHING;THROBBING

## 2023-06-01 LAB
ALBUMIN SERPL-MCNC: 3.1 G/DL (ref 3.4–5)
ANION GAP SERPL CALCULATED.3IONS-SCNC: 11 MMOL/L (ref 3–16)
BUN SERPL-MCNC: 40 MG/DL (ref 7–20)
CALCIUM SERPL-MCNC: 9.2 MG/DL (ref 8.3–10.6)
CHLORIDE SERPL-SCNC: 93 MMOL/L (ref 99–110)
CO2 SERPL-SCNC: 25 MMOL/L (ref 21–32)
CREAT SERPL-MCNC: 5 MG/DL (ref 0.9–1.3)
DEPRECATED RDW RBC AUTO: 17.1 % (ref 12.4–15.4)
GFR SERPLBLD CREATININE-BSD FMLA CKD-EPI: 13 ML/MIN/{1.73_M2}
GLUCOSE BLD-MCNC: 177 MG/DL (ref 70–99)
GLUCOSE BLD-MCNC: 189 MG/DL (ref 70–99)
GLUCOSE BLD-MCNC: 214 MG/DL (ref 70–99)
GLUCOSE BLD-MCNC: 216 MG/DL (ref 70–99)
GLUCOSE SERPL-MCNC: 155 MG/DL (ref 70–99)
HCT VFR BLD AUTO: 28.5 % (ref 40.5–52.5)
HGB BLD-MCNC: 9.3 G/DL (ref 13.5–17.5)
MCH RBC QN AUTO: 29.6 PG (ref 26–34)
MCHC RBC AUTO-ENTMCNC: 32.4 G/DL (ref 31–36)
MCV RBC AUTO: 91.3 FL (ref 80–100)
PERFORMED ON: ABNORMAL
PHOSPHATE SERPL-MCNC: 5 MG/DL (ref 2.5–4.9)
PLATELET # BLD AUTO: 356 K/UL (ref 135–450)
PMV BLD AUTO: 6.5 FL (ref 5–10.5)
POTASSIUM SERPL-SCNC: 5.1 MMOL/L (ref 3.5–5.1)
RBC # BLD AUTO: 3.13 M/UL (ref 4.2–5.9)
SODIUM SERPL-SCNC: 129 MMOL/L (ref 136–145)
WBC # BLD AUTO: 9.2 K/UL (ref 4–11)

## 2023-06-01 PROCEDURE — 2500000003 HC RX 250 WO HCPCS: Performed by: STUDENT IN AN ORGANIZED HEALTH CARE EDUCATION/TRAINING PROGRAM

## 2023-06-01 PROCEDURE — 36415 COLL VENOUS BLD VENIPUNCTURE: CPT

## 2023-06-01 PROCEDURE — 97530 THERAPEUTIC ACTIVITIES: CPT

## 2023-06-01 PROCEDURE — 2700000000 HC OXYGEN THERAPY PER DAY

## 2023-06-01 PROCEDURE — 6370000000 HC RX 637 (ALT 250 FOR IP): Performed by: NURSE PRACTITIONER

## 2023-06-01 PROCEDURE — 94761 N-INVAS EAR/PLS OXIMETRY MLT: CPT

## 2023-06-01 PROCEDURE — 85027 COMPLETE CBC AUTOMATED: CPT

## 2023-06-01 PROCEDURE — 2580000003 HC RX 258: Performed by: STUDENT IN AN ORGANIZED HEALTH CARE EDUCATION/TRAINING PROGRAM

## 2023-06-01 PROCEDURE — 1200000000 HC SEMI PRIVATE

## 2023-06-01 PROCEDURE — 80069 RENAL FUNCTION PANEL: CPT

## 2023-06-01 PROCEDURE — 6360000002 HC RX W HCPCS: Performed by: INTERNAL MEDICINE

## 2023-06-01 PROCEDURE — 6370000000 HC RX 637 (ALT 250 FOR IP): Performed by: STUDENT IN AN ORGANIZED HEALTH CARE EDUCATION/TRAINING PROGRAM

## 2023-06-01 RX ADMIN — HYDROMORPHONE HYDROCHLORIDE 1 MG: 1 INJECTION, SOLUTION INTRAMUSCULAR; INTRAVENOUS; SUBCUTANEOUS at 17:30

## 2023-06-01 RX ADMIN — ISOSORBIDE DINITRATE 20 MG: 10 TABLET ORAL at 20:15

## 2023-06-01 RX ADMIN — TORSEMIDE 100 MG: 100 TABLET ORAL at 09:33

## 2023-06-01 RX ADMIN — ISOSORBIDE DINITRATE 20 MG: 10 TABLET ORAL at 13:30

## 2023-06-01 RX ADMIN — CALCIUM ACETATE 667 MG: 667 CAPSULE ORAL at 09:33

## 2023-06-01 RX ADMIN — TRAZODONE HYDROCHLORIDE 50 MG: 50 TABLET ORAL at 21:09

## 2023-06-01 RX ADMIN — HYDROMORPHONE HYDROCHLORIDE 2 MG: 1 INJECTION, SOLUTION INTRAMUSCULAR; INTRAVENOUS; SUBCUTANEOUS at 03:28

## 2023-06-01 RX ADMIN — PANTOPRAZOLE SODIUM 40 MG: 40 TABLET, DELAYED RELEASE ORAL at 06:48

## 2023-06-01 RX ADMIN — HYDROMORPHONE HYDROCHLORIDE 1 MG: 1 INJECTION, SOLUTION INTRAMUSCULAR; INTRAVENOUS; SUBCUTANEOUS at 09:34

## 2023-06-01 RX ADMIN — OXYCODONE HYDROCHLORIDE 10 MG: 5 TABLET ORAL at 00:47

## 2023-06-01 RX ADMIN — OXYCODONE HYDROCHLORIDE 10 MG: 5 TABLET ORAL at 20:15

## 2023-06-01 RX ADMIN — APIXABAN 5 MG: 5 TABLET, FILM COATED ORAL at 09:33

## 2023-06-01 RX ADMIN — GABAPENTIN 100 MG: 100 CAPSULE ORAL at 20:17

## 2023-06-01 RX ADMIN — APIXABAN 5 MG: 5 TABLET, FILM COATED ORAL at 20:16

## 2023-06-01 RX ADMIN — OXYCODONE HYDROCHLORIDE 10 MG: 5 TABLET ORAL at 06:48

## 2023-06-01 RX ADMIN — CLOPIDOGREL BISULFATE 75 MG: 75 TABLET ORAL at 09:33

## 2023-06-01 RX ADMIN — PRAVASTATIN SODIUM 40 MG: 40 TABLET ORAL at 09:33

## 2023-06-01 RX ADMIN — CALCIUM ACETATE 667 MG: 667 CAPSULE ORAL at 13:30

## 2023-06-01 RX ADMIN — DULOXETINE HYDROCHLORIDE 60 MG: 60 CAPSULE, DELAYED RELEASE ORAL at 09:33

## 2023-06-01 RX ADMIN — SODIUM CHLORIDE, PRESERVATIVE FREE 10 ML: 5 INJECTION INTRAVENOUS at 20:18

## 2023-06-01 RX ADMIN — ISOSORBIDE DINITRATE 20 MG: 10 TABLET ORAL at 09:33

## 2023-06-01 RX ADMIN — SODIUM CHLORIDE, PRESERVATIVE FREE 10 ML: 5 INJECTION INTRAVENOUS at 09:34

## 2023-06-01 RX ADMIN — OXYCODONE HYDROCHLORIDE 10 MG: 5 TABLET ORAL at 13:30

## 2023-06-01 RX ADMIN — METOPROLOL SUCCINATE 25 MG: 25 TABLET, EXTENDED RELEASE ORAL at 09:33

## 2023-06-01 RX ADMIN — CALCIUM ACETATE 667 MG: 667 CAPSULE ORAL at 17:30

## 2023-06-01 RX ADMIN — GABAPENTIN 100 MG: 100 CAPSULE ORAL at 09:33

## 2023-06-01 RX ADMIN — GABAPENTIN 100 MG: 100 CAPSULE ORAL at 13:30

## 2023-06-01 ASSESSMENT — PAIN DESCRIPTION - DESCRIPTORS
DESCRIPTORS: ACHING;POUNDING
DESCRIPTORS: ACHING
DESCRIPTORS: ACHING;THROBBING
DESCRIPTORS: ACHING;POUNDING

## 2023-06-01 ASSESSMENT — PAIN DESCRIPTION - ORIENTATION
ORIENTATION: RIGHT
ORIENTATION: RIGHT;LEFT
ORIENTATION: RIGHT

## 2023-06-01 ASSESSMENT — PAIN DESCRIPTION - LOCATION
LOCATION: LEG
LOCATION: BACK
LOCATION: LEG

## 2023-06-01 ASSESSMENT — PAIN SCALES - GENERAL
PAINLEVEL_OUTOF10: 0
PAINLEVEL_OUTOF10: 9
PAINLEVEL_OUTOF10: 10
PAINLEVEL_OUTOF10: 9
PAINLEVEL_OUTOF10: 10
PAINLEVEL_OUTOF10: 0
PAINLEVEL_OUTOF10: 10
PAINLEVEL_OUTOF10: 0
PAINLEVEL_OUTOF10: 10
PAINLEVEL_OUTOF10: 10

## 2023-06-01 ASSESSMENT — PAIN SCALES - WONG BAKER
WONGBAKER_NUMERICALRESPONSE: 0

## 2023-06-01 ASSESSMENT — PAIN DESCRIPTION - FREQUENCY: FREQUENCY: CONTINUOUS

## 2023-06-01 ASSESSMENT — PAIN DESCRIPTION - PAIN TYPE: TYPE: CHRONIC PAIN;ACUTE PAIN

## 2023-06-01 ASSESSMENT — PAIN DESCRIPTION - ONSET: ONSET: ON-GOING

## 2023-06-02 ENCOUNTER — APPOINTMENT (OUTPATIENT)
Dept: GENERAL RADIOLOGY | Age: 53
End: 2023-06-02
Payer: MEDICARE

## 2023-06-02 LAB
GLUCOSE BLD-MCNC: 155 MG/DL (ref 70–99)
GLUCOSE BLD-MCNC: 192 MG/DL (ref 70–99)
GLUCOSE BLD-MCNC: 203 MG/DL (ref 70–99)
GLUCOSE BLD-MCNC: 237 MG/DL (ref 70–99)
PERFORMED ON: ABNORMAL

## 2023-06-02 PROCEDURE — 6370000000 HC RX 637 (ALT 250 FOR IP): Performed by: PODIATRIST

## 2023-06-02 PROCEDURE — 2500000003 HC RX 250 WO HCPCS: Performed by: PODIATRIST

## 2023-06-02 PROCEDURE — 6360000002 HC RX W HCPCS: Performed by: INTERNAL MEDICINE

## 2023-06-02 PROCEDURE — 1200000000 HC SEMI PRIVATE

## 2023-06-02 PROCEDURE — 2700000000 HC OXYGEN THERAPY PER DAY

## 2023-06-02 PROCEDURE — 6370000000 HC RX 637 (ALT 250 FOR IP): Performed by: NURSE PRACTITIONER

## 2023-06-02 PROCEDURE — 73630 X-RAY EXAM OF FOOT: CPT

## 2023-06-02 PROCEDURE — 2580000003 HC RX 258: Performed by: PODIATRIST

## 2023-06-02 PROCEDURE — 94761 N-INVAS EAR/PLS OXIMETRY MLT: CPT

## 2023-06-02 PROCEDURE — 6360000002 HC RX W HCPCS

## 2023-06-02 RX ADMIN — METOPROLOL SUCCINATE 25 MG: 25 TABLET, EXTENDED RELEASE ORAL at 09:13

## 2023-06-02 RX ADMIN — CALCIUM ACETATE 667 MG: 667 CAPSULE ORAL at 12:00

## 2023-06-02 RX ADMIN — ISOSORBIDE DINITRATE 20 MG: 10 TABLET ORAL at 20:55

## 2023-06-02 RX ADMIN — CALCIUM ACETATE 667 MG: 667 CAPSULE ORAL at 09:12

## 2023-06-02 RX ADMIN — HEPARIN SODIUM 3600 UNITS: 1000 INJECTION INTRAVENOUS; SUBCUTANEOUS at 18:01

## 2023-06-02 RX ADMIN — GABAPENTIN 100 MG: 100 CAPSULE ORAL at 09:12

## 2023-06-02 RX ADMIN — APIXABAN 5 MG: 5 TABLET, FILM COATED ORAL at 09:13

## 2023-06-02 RX ADMIN — OXYCODONE HYDROCHLORIDE 10 MG: 5 TABLET ORAL at 05:04

## 2023-06-02 RX ADMIN — PANTOPRAZOLE SODIUM 40 MG: 40 TABLET, DELAYED RELEASE ORAL at 05:05

## 2023-06-02 RX ADMIN — GABAPENTIN 100 MG: 100 CAPSULE ORAL at 13:48

## 2023-06-02 RX ADMIN — OXYCODONE HYDROCHLORIDE 10 MG: 5 TABLET ORAL at 18:30

## 2023-06-02 RX ADMIN — OXYCODONE HYDROCHLORIDE 10 MG: 5 TABLET ORAL at 13:48

## 2023-06-02 RX ADMIN — SODIUM CHLORIDE, PRESERVATIVE FREE 10 ML: 5 INJECTION INTRAVENOUS at 20:56

## 2023-06-02 RX ADMIN — EPOETIN ALFA-EPBX 10000 UNITS: 10000 INJECTION, SOLUTION INTRAVENOUS; SUBCUTANEOUS at 16:48

## 2023-06-02 RX ADMIN — PRAVASTATIN SODIUM 40 MG: 40 TABLET ORAL at 09:13

## 2023-06-02 RX ADMIN — GABAPENTIN 100 MG: 100 CAPSULE ORAL at 20:55

## 2023-06-02 RX ADMIN — CLOPIDOGREL BISULFATE 75 MG: 75 TABLET ORAL at 09:12

## 2023-06-02 RX ADMIN — CALCIUM ACETATE 667 MG: 667 CAPSULE ORAL at 18:30

## 2023-06-02 RX ADMIN — DULOXETINE HYDROCHLORIDE 60 MG: 60 CAPSULE, DELAYED RELEASE ORAL at 09:12

## 2023-06-02 RX ADMIN — SODIUM CHLORIDE, PRESERVATIVE FREE 10 ML: 5 INJECTION INTRAVENOUS at 09:13

## 2023-06-02 RX ADMIN — TRAZODONE HYDROCHLORIDE 50 MG: 50 TABLET ORAL at 20:55

## 2023-06-02 RX ADMIN — TORSEMIDE 100 MG: 100 TABLET ORAL at 09:13

## 2023-06-02 RX ADMIN — ISOSORBIDE DINITRATE 20 MG: 10 TABLET ORAL at 13:48

## 2023-06-02 RX ADMIN — OXYCODONE HYDROCHLORIDE 10 MG: 5 TABLET ORAL at 09:13

## 2023-06-02 RX ADMIN — APIXABAN 5 MG: 5 TABLET, FILM COATED ORAL at 20:55

## 2023-06-02 RX ADMIN — ISOSORBIDE DINITRATE 20 MG: 10 TABLET ORAL at 09:13

## 2023-06-02 ASSESSMENT — PAIN DESCRIPTION - DESCRIPTORS
DESCRIPTORS: ACHING

## 2023-06-02 ASSESSMENT — PAIN DESCRIPTION - PAIN TYPE
TYPE: CHRONIC PAIN;ACUTE PAIN
TYPE: ACUTE PAIN;CHRONIC PAIN
TYPE: CHRONIC PAIN;ACUTE PAIN

## 2023-06-02 ASSESSMENT — PAIN SCALES - WONG BAKER
WONGBAKER_NUMERICALRESPONSE: 4
WONGBAKER_NUMERICALRESPONSE: 0
WONGBAKER_NUMERICALRESPONSE: 0
WONGBAKER_NUMERICALRESPONSE: 4
WONGBAKER_NUMERICALRESPONSE: 0

## 2023-06-02 ASSESSMENT — PAIN DESCRIPTION - ONSET
ONSET: ON-GOING

## 2023-06-02 ASSESSMENT — PAIN DESCRIPTION - LOCATION
LOCATION: BACK
LOCATION: BACK;FOOT;HIP
LOCATION: BACK;FOOT;HIP
LOCATION: BACK;HIP
LOCATION: BACK;HIP

## 2023-06-02 ASSESSMENT — PAIN SCALES - GENERAL
PAINLEVEL_OUTOF10: 10
PAINLEVEL_OUTOF10: 9
PAINLEVEL_OUTOF10: 6
PAINLEVEL_OUTOF10: 7
PAINLEVEL_OUTOF10: 9
PAINLEVEL_OUTOF10: 6

## 2023-06-02 ASSESSMENT — PAIN - FUNCTIONAL ASSESSMENT
PAIN_FUNCTIONAL_ASSESSMENT: ACTIVITIES ARE NOT PREVENTED

## 2023-06-02 ASSESSMENT — PAIN DESCRIPTION - ORIENTATION
ORIENTATION: LEFT;RIGHT;LOWER
ORIENTATION: RIGHT;LEFT
ORIENTATION: LEFT;RIGHT

## 2023-06-02 ASSESSMENT — PAIN DESCRIPTION - FREQUENCY
FREQUENCY: CONTINUOUS

## 2023-06-03 LAB
ALBUMIN SERPL-MCNC: 3.3 G/DL (ref 3.4–5)
ANION GAP SERPL CALCULATED.3IONS-SCNC: 15 MMOL/L (ref 3–16)
BUN SERPL-MCNC: 37 MG/DL (ref 7–20)
CALCIUM SERPL-MCNC: 9.2 MG/DL (ref 8.3–10.6)
CHLORIDE SERPL-SCNC: 91 MMOL/L (ref 99–110)
CO2 SERPL-SCNC: 23 MMOL/L (ref 21–32)
CREAT SERPL-MCNC: 4.5 MG/DL (ref 0.9–1.3)
DEPRECATED RDW RBC AUTO: 17.9 % (ref 12.4–15.4)
GFR SERPLBLD CREATININE-BSD FMLA CKD-EPI: 15 ML/MIN/{1.73_M2}
GLUCOSE BLD-MCNC: 158 MG/DL (ref 70–99)
GLUCOSE BLD-MCNC: 174 MG/DL (ref 70–99)
GLUCOSE BLD-MCNC: 214 MG/DL (ref 70–99)
GLUCOSE BLD-MCNC: 232 MG/DL (ref 70–99)
GLUCOSE SERPL-MCNC: 155 MG/DL (ref 70–99)
HCT VFR BLD AUTO: 26.9 % (ref 40.5–52.5)
HGB BLD-MCNC: 8.7 G/DL (ref 13.5–17.5)
MCH RBC QN AUTO: 29.4 PG (ref 26–34)
MCHC RBC AUTO-ENTMCNC: 32.2 G/DL (ref 31–36)
MCV RBC AUTO: 91.4 FL (ref 80–100)
PERFORMED ON: ABNORMAL
PHOSPHATE SERPL-MCNC: 3.7 MG/DL (ref 2.5–4.9)
PLATELET # BLD AUTO: 373 K/UL (ref 135–450)
PMV BLD AUTO: 7 FL (ref 5–10.5)
POTASSIUM SERPL-SCNC: 4.6 MMOL/L (ref 3.5–5.1)
RBC # BLD AUTO: 2.94 M/UL (ref 4.2–5.9)
SODIUM SERPL-SCNC: 129 MMOL/L (ref 136–145)
WBC # BLD AUTO: 8.6 K/UL (ref 4–11)

## 2023-06-03 PROCEDURE — 94761 N-INVAS EAR/PLS OXIMETRY MLT: CPT

## 2023-06-03 PROCEDURE — 2500000003 HC RX 250 WO HCPCS: Performed by: PODIATRIST

## 2023-06-03 PROCEDURE — 6360000002 HC RX W HCPCS

## 2023-06-03 PROCEDURE — 2700000000 HC OXYGEN THERAPY PER DAY

## 2023-06-03 PROCEDURE — 6370000000 HC RX 637 (ALT 250 FOR IP): Performed by: PODIATRIST

## 2023-06-03 PROCEDURE — 85027 COMPLETE CBC AUTOMATED: CPT

## 2023-06-03 PROCEDURE — 2580000003 HC RX 258: Performed by: PODIATRIST

## 2023-06-03 PROCEDURE — 90935 HEMODIALYSIS ONE EVALUATION: CPT

## 2023-06-03 PROCEDURE — 1200000000 HC SEMI PRIVATE

## 2023-06-03 PROCEDURE — 80069 RENAL FUNCTION PANEL: CPT

## 2023-06-03 PROCEDURE — 36415 COLL VENOUS BLD VENIPUNCTURE: CPT

## 2023-06-03 PROCEDURE — 6370000000 HC RX 637 (ALT 250 FOR IP): Performed by: NURSE PRACTITIONER

## 2023-06-03 RX ORDER — HEPARIN SODIUM 1000 [USP'U]/ML
INJECTION, SOLUTION INTRAVENOUS; SUBCUTANEOUS
Status: COMPLETED
Start: 2023-06-03 | End: 2023-06-03

## 2023-06-03 RX ADMIN — APIXABAN 5 MG: 5 TABLET, FILM COATED ORAL at 20:43

## 2023-06-03 RX ADMIN — ISOSORBIDE DINITRATE 20 MG: 10 TABLET ORAL at 08:32

## 2023-06-03 RX ADMIN — APIXABAN 5 MG: 5 TABLET, FILM COATED ORAL at 08:32

## 2023-06-03 RX ADMIN — OXYCODONE HYDROCHLORIDE 10 MG: 5 TABLET ORAL at 12:52

## 2023-06-03 RX ADMIN — OXYCODONE HYDROCHLORIDE 10 MG: 5 TABLET ORAL at 02:18

## 2023-06-03 RX ADMIN — CALCIUM ACETATE 667 MG: 667 CAPSULE ORAL at 16:40

## 2023-06-03 RX ADMIN — GABAPENTIN 100 MG: 100 CAPSULE ORAL at 08:32

## 2023-06-03 RX ADMIN — PRAVASTATIN SODIUM 40 MG: 40 TABLET ORAL at 08:32

## 2023-06-03 RX ADMIN — TORSEMIDE 100 MG: 100 TABLET ORAL at 08:32

## 2023-06-03 RX ADMIN — OXYCODONE HYDROCHLORIDE 10 MG: 5 TABLET ORAL at 08:31

## 2023-06-03 RX ADMIN — GABAPENTIN 100 MG: 100 CAPSULE ORAL at 20:43

## 2023-06-03 RX ADMIN — HEPARIN SODIUM 3600 UNITS: 1000 INJECTION INTRAVENOUS; SUBCUTANEOUS at 11:30

## 2023-06-03 RX ADMIN — CLOPIDOGREL BISULFATE 75 MG: 75 TABLET ORAL at 08:32

## 2023-06-03 RX ADMIN — METOPROLOL SUCCINATE 25 MG: 25 TABLET, EXTENDED RELEASE ORAL at 08:32

## 2023-06-03 RX ADMIN — ISOSORBIDE DINITRATE 20 MG: 10 TABLET ORAL at 16:40

## 2023-06-03 RX ADMIN — ISOSORBIDE DINITRATE 20 MG: 10 TABLET ORAL at 20:43

## 2023-06-03 RX ADMIN — CALCIUM ACETATE 667 MG: 667 CAPSULE ORAL at 08:32

## 2023-06-03 RX ADMIN — OXYCODONE HYDROCHLORIDE 10 MG: 5 TABLET ORAL at 18:01

## 2023-06-03 RX ADMIN — PANTOPRAZOLE SODIUM 40 MG: 40 TABLET, DELAYED RELEASE ORAL at 07:07

## 2023-06-03 RX ADMIN — DULOXETINE HYDROCHLORIDE 60 MG: 60 CAPSULE, DELAYED RELEASE ORAL at 08:31

## 2023-06-03 RX ADMIN — SODIUM CHLORIDE, PRESERVATIVE FREE 10 ML: 5 INJECTION INTRAVENOUS at 20:44

## 2023-06-03 RX ADMIN — SODIUM CHLORIDE, PRESERVATIVE FREE 10 ML: 5 INJECTION INTRAVENOUS at 08:32

## 2023-06-03 RX ADMIN — TRAZODONE HYDROCHLORIDE 50 MG: 50 TABLET ORAL at 20:43

## 2023-06-03 ASSESSMENT — PAIN DESCRIPTION - LOCATION
LOCATION: FOOT
LOCATION: GENERALIZED
LOCATION: FOOT

## 2023-06-03 ASSESSMENT — PAIN SCALES - GENERAL
PAINLEVEL_OUTOF10: 9
PAINLEVEL_OUTOF10: 9
PAINLEVEL_OUTOF10: 10
PAINLEVEL_OUTOF10: 10
PAINLEVEL_OUTOF10: 8
PAINLEVEL_OUTOF10: 10
PAINLEVEL_OUTOF10: 10

## 2023-06-03 ASSESSMENT — PAIN DESCRIPTION - ORIENTATION: ORIENTATION: RIGHT

## 2023-06-04 LAB
ALBUMIN SERPL-MCNC: 3.1 G/DL (ref 3.4–5)
ANION GAP SERPL CALCULATED.3IONS-SCNC: 14 MMOL/L (ref 3–16)
BUN SERPL-MCNC: 60 MG/DL (ref 7–20)
CALCIUM SERPL-MCNC: 10.1 MG/DL (ref 8.3–10.6)
CHLORIDE SERPL-SCNC: 91 MMOL/L (ref 99–110)
CO2 SERPL-SCNC: 21 MMOL/L (ref 21–32)
CREAT SERPL-MCNC: 6.1 MG/DL (ref 0.9–1.3)
DEPRECATED RDW RBC AUTO: 18.1 % (ref 12.4–15.4)
GFR SERPLBLD CREATININE-BSD FMLA CKD-EPI: 10 ML/MIN/{1.73_M2}
GLUCOSE BLD-MCNC: 181 MG/DL (ref 70–99)
GLUCOSE BLD-MCNC: 194 MG/DL (ref 70–99)
GLUCOSE BLD-MCNC: 242 MG/DL (ref 70–99)
GLUCOSE SERPL-MCNC: 209 MG/DL (ref 70–99)
HCT VFR BLD AUTO: 32.7 % (ref 40.5–52.5)
HGB BLD-MCNC: 10.2 G/DL (ref 13.5–17.5)
MCH RBC QN AUTO: 29.6 PG (ref 26–34)
MCHC RBC AUTO-ENTMCNC: 31 G/DL (ref 31–36)
MCV RBC AUTO: 95.4 FL (ref 80–100)
PERFORMED ON: ABNORMAL
PHOSPHATE SERPL-MCNC: 5.5 MG/DL (ref 2.5–4.9)
PLATELET # BLD AUTO: 222 K/UL (ref 135–450)
PMV BLD AUTO: 7.2 FL (ref 5–10.5)
POTASSIUM SERPL-SCNC: 6.3 MMOL/L (ref 3.5–5.1)
RBC # BLD AUTO: 3.43 M/UL (ref 4.2–5.9)
SODIUM SERPL-SCNC: 126 MMOL/L (ref 136–145)
WBC # BLD AUTO: 7.8 K/UL (ref 4–11)

## 2023-06-04 PROCEDURE — 2700000000 HC OXYGEN THERAPY PER DAY

## 2023-06-04 PROCEDURE — 6370000000 HC RX 637 (ALT 250 FOR IP): Performed by: PODIATRIST

## 2023-06-04 PROCEDURE — 2580000003 HC RX 258: Performed by: PODIATRIST

## 2023-06-04 PROCEDURE — 1200000000 HC SEMI PRIVATE

## 2023-06-04 PROCEDURE — 36415 COLL VENOUS BLD VENIPUNCTURE: CPT

## 2023-06-04 PROCEDURE — 2500000003 HC RX 250 WO HCPCS: Performed by: PODIATRIST

## 2023-06-04 PROCEDURE — 6370000000 HC RX 637 (ALT 250 FOR IP): Performed by: NURSE PRACTITIONER

## 2023-06-04 PROCEDURE — 80069 RENAL FUNCTION PANEL: CPT

## 2023-06-04 PROCEDURE — 94761 N-INVAS EAR/PLS OXIMETRY MLT: CPT

## 2023-06-04 PROCEDURE — 85027 COMPLETE CBC AUTOMATED: CPT

## 2023-06-04 RX ADMIN — APIXABAN 5 MG: 5 TABLET, FILM COATED ORAL at 08:49

## 2023-06-04 RX ADMIN — OXYCODONE HYDROCHLORIDE 10 MG: 5 TABLET ORAL at 17:02

## 2023-06-04 RX ADMIN — GABAPENTIN 100 MG: 100 CAPSULE ORAL at 13:05

## 2023-06-04 RX ADMIN — OXYCODONE HYDROCHLORIDE 10 MG: 5 TABLET ORAL at 13:05

## 2023-06-04 RX ADMIN — CALCIUM ACETATE 667 MG: 667 CAPSULE ORAL at 17:02

## 2023-06-04 RX ADMIN — DULOXETINE HYDROCHLORIDE 60 MG: 60 CAPSULE, DELAYED RELEASE ORAL at 08:49

## 2023-06-04 RX ADMIN — ISOSORBIDE DINITRATE 20 MG: 10 TABLET ORAL at 08:49

## 2023-06-04 RX ADMIN — CLOPIDOGREL BISULFATE 75 MG: 75 TABLET ORAL at 08:49

## 2023-06-04 RX ADMIN — OXYCODONE HYDROCHLORIDE 10 MG: 5 TABLET ORAL at 08:54

## 2023-06-04 RX ADMIN — GABAPENTIN 100 MG: 100 CAPSULE ORAL at 08:49

## 2023-06-04 RX ADMIN — OXYCODONE HYDROCHLORIDE 10 MG: 5 TABLET ORAL at 21:04

## 2023-06-04 RX ADMIN — METOPROLOL SUCCINATE 25 MG: 25 TABLET, EXTENDED RELEASE ORAL at 08:49

## 2023-06-04 RX ADMIN — TRAZODONE HYDROCHLORIDE 50 MG: 50 TABLET ORAL at 21:04

## 2023-06-04 RX ADMIN — GABAPENTIN 100 MG: 100 CAPSULE ORAL at 21:04

## 2023-06-04 RX ADMIN — CALCIUM ACETATE 667 MG: 667 CAPSULE ORAL at 13:06

## 2023-06-04 RX ADMIN — CALCIUM ACETATE 667 MG: 667 CAPSULE ORAL at 08:49

## 2023-06-04 RX ADMIN — OXYCODONE HYDROCHLORIDE 10 MG: 5 TABLET ORAL at 04:54

## 2023-06-04 RX ADMIN — ISOSORBIDE DINITRATE 20 MG: 10 TABLET ORAL at 13:05

## 2023-06-04 RX ADMIN — SODIUM CHLORIDE, PRESERVATIVE FREE 10 ML: 5 INJECTION INTRAVENOUS at 08:49

## 2023-06-04 RX ADMIN — APIXABAN 5 MG: 5 TABLET, FILM COATED ORAL at 21:04

## 2023-06-04 RX ADMIN — TORSEMIDE 100 MG: 100 TABLET ORAL at 08:49

## 2023-06-04 RX ADMIN — PANTOPRAZOLE SODIUM 40 MG: 40 TABLET, DELAYED RELEASE ORAL at 05:20

## 2023-06-04 RX ADMIN — PRAVASTATIN SODIUM 40 MG: 40 TABLET ORAL at 08:49

## 2023-06-04 RX ADMIN — SODIUM CHLORIDE, PRESERVATIVE FREE 10 ML: 5 INJECTION INTRAVENOUS at 21:07

## 2023-06-04 RX ADMIN — ISOSORBIDE DINITRATE 20 MG: 10 TABLET ORAL at 21:04

## 2023-06-04 ASSESSMENT — PAIN DESCRIPTION - LOCATION
LOCATION: FOOT

## 2023-06-04 ASSESSMENT — PAIN DESCRIPTION - ORIENTATION
ORIENTATION: RIGHT
ORIENTATION: RIGHT

## 2023-06-04 ASSESSMENT — PAIN SCALES - GENERAL
PAINLEVEL_OUTOF10: 10
PAINLEVEL_OUTOF10: 9
PAINLEVEL_OUTOF10: 10
PAINLEVEL_OUTOF10: 9
PAINLEVEL_OUTOF10: 9
PAINLEVEL_OUTOF10: 8

## 2023-06-04 ASSESSMENT — PAIN - FUNCTIONAL ASSESSMENT: PAIN_FUNCTIONAL_ASSESSMENT: PREVENTS OR INTERFERES SOME ACTIVE ACTIVITIES AND ADLS

## 2023-06-04 ASSESSMENT — PAIN DESCRIPTION - PAIN TYPE: TYPE: ACUTE PAIN;CHRONIC PAIN

## 2023-06-04 ASSESSMENT — PAIN DESCRIPTION - DESCRIPTORS: DESCRIPTORS: DISCOMFORT

## 2023-06-05 LAB
ALBUMIN SERPL-MCNC: 3.4 G/DL (ref 3.4–5)
ANION GAP SERPL CALCULATED.3IONS-SCNC: 18 MMOL/L (ref 3–16)
BUN SERPL-MCNC: 87 MG/DL (ref 7–20)
CALCIUM SERPL-MCNC: 10 MG/DL (ref 8.3–10.6)
CHLORIDE SERPL-SCNC: 89 MMOL/L (ref 99–110)
CO2 SERPL-SCNC: 22 MMOL/L (ref 21–32)
CREAT SERPL-MCNC: 7.2 MG/DL (ref 0.9–1.3)
DEPRECATED RDW RBC AUTO: 17.8 % (ref 12.4–15.4)
FUNGUS SPEC CULT: NORMAL
GFR SERPLBLD CREATININE-BSD FMLA CKD-EPI: 8 ML/MIN/{1.73_M2}
GLUCOSE BLD-MCNC: 140 MG/DL (ref 70–99)
GLUCOSE BLD-MCNC: 193 MG/DL (ref 70–99)
GLUCOSE BLD-MCNC: 207 MG/DL (ref 70–99)
GLUCOSE BLD-MCNC: 279 MG/DL (ref 70–99)
GLUCOSE SERPL-MCNC: 140 MG/DL (ref 70–99)
HCT VFR BLD AUTO: 27.4 % (ref 40.5–52.5)
HGB BLD-MCNC: 9 G/DL (ref 13.5–17.5)
LOEFFLER MB STN SPEC: NORMAL
MAGNESIUM SERPL-MCNC: 2.2 MG/DL (ref 1.8–2.4)
MCH RBC QN AUTO: 29.9 PG (ref 26–34)
MCHC RBC AUTO-ENTMCNC: 32.8 G/DL (ref 31–36)
MCV RBC AUTO: 91.3 FL (ref 80–100)
PERFORMED ON: ABNORMAL
PHOSPHATE SERPL-MCNC: 6.3 MG/DL (ref 2.5–4.9)
PLATELET # BLD AUTO: 352 K/UL (ref 135–450)
PMV BLD AUTO: 7.2 FL (ref 5–10.5)
POTASSIUM SERPL-SCNC: 6.4 MMOL/L (ref 3.5–5.1)
RBC # BLD AUTO: 2.99 M/UL (ref 4.2–5.9)
SODIUM SERPL-SCNC: 129 MMOL/L (ref 136–145)
WBC # BLD AUTO: 8.4 K/UL (ref 4–11)

## 2023-06-05 PROCEDURE — 6370000000 HC RX 637 (ALT 250 FOR IP): Performed by: PODIATRIST

## 2023-06-05 PROCEDURE — 85027 COMPLETE CBC AUTOMATED: CPT

## 2023-06-05 PROCEDURE — 2580000003 HC RX 258: Performed by: PODIATRIST

## 2023-06-05 PROCEDURE — 97530 THERAPEUTIC ACTIVITIES: CPT

## 2023-06-05 PROCEDURE — 6360000002 HC RX W HCPCS

## 2023-06-05 PROCEDURE — 1200000000 HC SEMI PRIVATE

## 2023-06-05 PROCEDURE — 2700000000 HC OXYGEN THERAPY PER DAY

## 2023-06-05 PROCEDURE — 94761 N-INVAS EAR/PLS OXIMETRY MLT: CPT

## 2023-06-05 PROCEDURE — 97110 THERAPEUTIC EXERCISES: CPT

## 2023-06-05 PROCEDURE — 97535 SELF CARE MNGMENT TRAINING: CPT

## 2023-06-05 PROCEDURE — 80069 RENAL FUNCTION PANEL: CPT

## 2023-06-05 PROCEDURE — 36415 COLL VENOUS BLD VENIPUNCTURE: CPT

## 2023-06-05 PROCEDURE — 2500000003 HC RX 250 WO HCPCS: Performed by: PODIATRIST

## 2023-06-05 PROCEDURE — 90935 HEMODIALYSIS ONE EVALUATION: CPT

## 2023-06-05 PROCEDURE — 83735 ASSAY OF MAGNESIUM: CPT

## 2023-06-05 PROCEDURE — 6370000000 HC RX 637 (ALT 250 FOR IP): Performed by: NURSE PRACTITIONER

## 2023-06-05 RX ORDER — HEPARIN SODIUM 1000 [USP'U]/ML
INJECTION, SOLUTION INTRAVENOUS; SUBCUTANEOUS
Status: COMPLETED
Start: 2023-06-05 | End: 2023-06-05

## 2023-06-05 RX ADMIN — CLOPIDOGREL BISULFATE 75 MG: 75 TABLET ORAL at 09:07

## 2023-06-05 RX ADMIN — OXYCODONE HYDROCHLORIDE 10 MG: 5 TABLET ORAL at 09:07

## 2023-06-05 RX ADMIN — ISOSORBIDE DINITRATE 20 MG: 10 TABLET ORAL at 15:03

## 2023-06-05 RX ADMIN — APIXABAN 5 MG: 5 TABLET, FILM COATED ORAL at 09:07

## 2023-06-05 RX ADMIN — ISOSORBIDE DINITRATE 20 MG: 10 TABLET ORAL at 21:27

## 2023-06-05 RX ADMIN — ISOSORBIDE DINITRATE 20 MG: 10 TABLET ORAL at 09:07

## 2023-06-05 RX ADMIN — OXYCODONE HYDROCHLORIDE 10 MG: 5 TABLET ORAL at 19:30

## 2023-06-05 RX ADMIN — GABAPENTIN 100 MG: 100 CAPSULE ORAL at 21:27

## 2023-06-05 RX ADMIN — PRAVASTATIN SODIUM 40 MG: 40 TABLET ORAL at 09:06

## 2023-06-05 RX ADMIN — PANTOPRAZOLE SODIUM 40 MG: 40 TABLET, DELAYED RELEASE ORAL at 05:06

## 2023-06-05 RX ADMIN — TRAZODONE HYDROCHLORIDE 50 MG: 50 TABLET ORAL at 21:27

## 2023-06-05 RX ADMIN — APIXABAN 5 MG: 5 TABLET, FILM COATED ORAL at 21:26

## 2023-06-05 RX ADMIN — OXYCODONE HYDROCHLORIDE 10 MG: 5 TABLET ORAL at 15:02

## 2023-06-05 RX ADMIN — DULOXETINE HYDROCHLORIDE 60 MG: 60 CAPSULE, DELAYED RELEASE ORAL at 09:07

## 2023-06-05 RX ADMIN — GABAPENTIN 100 MG: 100 CAPSULE ORAL at 15:03

## 2023-06-05 RX ADMIN — SODIUM CHLORIDE, PRESERVATIVE FREE 10 ML: 5 INJECTION INTRAVENOUS at 09:10

## 2023-06-05 RX ADMIN — OXYCODONE HYDROCHLORIDE 10 MG: 5 TABLET ORAL at 01:06

## 2023-06-05 RX ADMIN — METOPROLOL SUCCINATE 25 MG: 25 TABLET, EXTENDED RELEASE ORAL at 09:06

## 2023-06-05 RX ADMIN — GABAPENTIN 100 MG: 100 CAPSULE ORAL at 09:07

## 2023-06-05 RX ADMIN — INSULIN LISPRO 4 UNITS: 100 INJECTION, SOLUTION INTRAVENOUS; SUBCUTANEOUS at 18:00

## 2023-06-05 RX ADMIN — EPOETIN ALFA-EPBX 10000 UNITS: 10000 INJECTION, SOLUTION INTRAVENOUS; SUBCUTANEOUS at 13:04

## 2023-06-05 RX ADMIN — OXYCODONE HYDROCHLORIDE 10 MG: 5 TABLET ORAL at 05:06

## 2023-06-05 RX ADMIN — CALCIUM ACETATE 667 MG: 667 CAPSULE ORAL at 09:06

## 2023-06-05 RX ADMIN — HEPARIN SODIUM 3600 UNITS: 1000 INJECTION INTRAVENOUS; SUBCUTANEOUS at 14:25

## 2023-06-05 RX ADMIN — CALCIUM ACETATE 667 MG: 667 CAPSULE ORAL at 18:01

## 2023-06-05 RX ADMIN — SODIUM CHLORIDE, PRESERVATIVE FREE 10 ML: 5 INJECTION INTRAVENOUS at 21:27

## 2023-06-05 ASSESSMENT — PAIN - FUNCTIONAL ASSESSMENT
PAIN_FUNCTIONAL_ASSESSMENT: PREVENTS OR INTERFERES SOME ACTIVE ACTIVITIES AND ADLS

## 2023-06-05 ASSESSMENT — PAIN DESCRIPTION - LOCATION
LOCATION: LEG;BACK
LOCATION: FOOT

## 2023-06-05 ASSESSMENT — PAIN DESCRIPTION - PAIN TYPE
TYPE: ACUTE PAIN;CHRONIC PAIN
TYPE: CHRONIC PAIN
TYPE: CHRONIC PAIN;ACUTE PAIN

## 2023-06-05 ASSESSMENT — PAIN SCALES - GENERAL
PAINLEVEL_OUTOF10: 8
PAINLEVEL_OUTOF10: 10

## 2023-06-05 ASSESSMENT — PAIN DESCRIPTION - DESCRIPTORS
DESCRIPTORS: THROBBING
DESCRIPTORS: DISCOMFORT

## 2023-06-05 ASSESSMENT — PAIN DESCRIPTION - ONSET: ONSET: ON-GOING

## 2023-06-05 ASSESSMENT — PAIN DESCRIPTION - ORIENTATION
ORIENTATION: RIGHT

## 2023-06-05 NOTE — DISCHARGE INSTR - COC
without complication, without long-term current use of insulin ([facility]) E11.9    ZAINAB (obstructive sleep apnea) G47.33    Pleural effusion on left J90    Chronic anemia D64.9    Nonrheumatic aortic valve stenosis I35.0    Mucus plugging of bronchi T17.500A    Hemodialysis-associated hypotension I95.3    ESRD (end stage renal disease) on dialysis ([facility]) N18.6, Z99.2    Hypotension due to drugs S07.8    Acute diastolic CHF (congestive heart failure) ([facility]) I50.31    Neuromuscular disorder ([facility]) G70.9    Renovascular hypertension I15.0    Mixed hyperlipidemia E78.2    Cigarette nicotine dependence in remission F17.211    Pulmonary edema J81.1    Fluid overload E87.70    Anemia of chronic disease D63.8    SOB (shortness of breath) on exertion R06.02    Steal syndrome of dialysis vascular access [facility]) T82.898A    Chronic, continuous use of opioids F11.90    Chronic bronchitis ([facility]) J42    Nasal congestion R09.81    Hypercholesteremia E78.00    Bradycardia R00.1    S/P TAVR (transcatheter aortic valve replacement) Z95.2    Syncope and collapse R55    PAF (paroxysmal atrial fibrillation) ([facility]) I48.0    Bilateral leg weakness R29.898    GBS (Guillain-Springfield syndrome) ([facility]) G61.0    Sinus pause I45.5    Weakness of both lower extremities R29.898    Sinus bradycardia R00.1    Ataxia R27.0    Peripheral vascular occlusive disease ([facility]) I73.9    Cellulitis of right foot L03.115    Iliac artery occlusion, 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.5    Cellulitis and abscess of hand L03.119, L02.519    Uncontrolled type 2 diabetes mellitus with hyperglycemia ([facility]) E11.65    Acute encephalopathy G93.40    Acute hypoxemic respiratory failure ([facility]) J96.01    Acute CVA (cerebrovascular accident) ([address] I63.9    Speech problem R47.9    Urinary tract infection with hematuria N39.0, R31.9    Respiratory failure with hypoxia ([address] J96.91    Acute respiratory failure with hypercapnia ([facility]) J96.02    Acute pulmonary edema ([facility]) J81.0    Grade II diastolic dysfunction

## 2023-06-05 NOTE — FLOWSHEET NOTE
06/05/23 1027 06/05/23 1410   Treatment   Time On 1029  --    Time Off  --  1607   Treatment Goal 3000  --    Vital Signs   /66 (!) 160/91   Temp 97.6 °F (36.4 °C) 97.8 °F (36.6 °C)   Pulse 77 80   Respirations 18 18   Weight - Scale 255 lb 8.2 oz (115.9 kg) 248 lb 14.4 oz (112.9 kg)   Weight Method Bed scale Bed scale   Percent Weight Change 1.22 -2.59   Post-Hemodialysis Assessment   Rinseback Volume (ml)  --  400 ml   Blood Volume Processed (Liters)  --  79.1 l/min   Dialyzer Clearance  --  Lightly streaked   Duration of Treatment (minutes)  --  210 minutes   Hemodialysis Intake (ml)  --  400 ml   Hemodialysis Output (ml)  --  3550 ml   NET Removed (ml)  --  3150   Tolerated Treatment  --  Good     Treatment time: 3.5 hours  Net UF: 3100 ml     Pre weight: 115.9 kg   Post weight: 112.9 kg  EDW: 99.5 kg     Access used: Left chest wall TDC  Access function: Good with  ml/min     Medications or blood products given: Retacrit 10,000 units and heparin dwells to line  at end of tx. [facility] schedule: [facility]     Summary of response to treatment:  Pt tolerated well with HD, vital signs stable,     Cirt Line: Initial Hct: 25.5;   End Profile : A;   No refill present (HCT1 27.6 minus HCT#2 27.4=. 2) Max BV % tolerated 7/5        Copy of dialysis treatment record placed in chart, to be scanned into EMR. Report given to Irvine Peabody RN.

## 2023-06-05 NOTE — CARE COORDINATION
Cert still denied for [employer]. P2P scheduled for 2 pm today 6/5/23 and Dr. Dahiana Trammell will complete.  Macario Cespedes RN

## 2023-06-05 NOTE — CARE COORDINATION
CM spoke to patient at bedside about denial of P2P for IPR. SNF list provided to patient d/t therapy notes  pt required Max A x 2 and Mod cues for R LE to avoid WBing. Patient did not want to give decision on SNF until he speaks with spouse.  Cy Vásquez RN

## 2023-06-05 NOTE — PLAN OF CARE
Problem: Discharge Planning  Goal: Discharge to home or other facility with appropriate resources  Outcome: Progressing     Problem: Pain  Goal: Verbalizes/displays adequate comfort level or baseline comfort level  Outcome: Progressing     Problem: Safety - Adult  Goal: Free from fall injury  Outcome: Progressing     Problem: ABCDS Injury Assessment  Goal: Absence of physical injury  Outcome: Progressing       Problem: Respiratory - Adult  Goal: Achieves optimal ventilation and oxygenation  Outcome: Progressing     Problem: Skin/Tissue Integrity - Adult  Goal: Skin integrity remains intact  Outcome: Progressing     Problem: Musculoskeletal - Adult  Goal: Return mobility to safest level of function  Outcome: Progressing     Problem: Skin/Tissue Integrity - Adult  Goal: Incisions, wounds, or drain sites healing without S/S of infection  Outcome: Progressing     Problem: Nutrition Deficit:  Goal: Optimize nutritional status  Outcome: Progressing     Problem: Skin/Tissue Integrity  Goal: Absence of new skin breakdown  Description: 1. Monitor for areas of redness and/or skin breakdown  2. Assess vascular access sites hourly  3. Every 4-6 hours minimum:  Change oxygen saturation probe site  4. Every 4-6 hours:  If on nasal continuous positive airway pressure, respiratory therapy assess nares and determine need for appliance change or resting period.   Outcome: Progressing     Problem: Chronic Conditions and Co-morbidities  Goal: Patient's chronic conditions and co-morbidity symptoms are monitored and maintained or improved  Outcome: Progressing     Problem: Hematologic - Adult  Goal: Maintains hematologic stability  Outcome: Progressing

## 2023-06-05 NOTE — CARE COORDINATION
Cm spoke with Dr. Joshua Peck and the P2P did not get overturned still denied to [facility]. ETHAN acknowledged.      Seamus Arambula RN

## 2023-06-06 LAB
ACID FAST STN SPEC QL: NORMAL
ALBUMIN SERPL-MCNC: 3.4 G/DL (ref 3.4–5)
ANION GAP SERPL CALCULATED.3IONS-SCNC: 14 MMOL/L (ref 3–16)
BUN SERPL-MCNC: 57 MG/DL (ref 7–20)
CALCIUM SERPL-MCNC: 9.6 MG/DL (ref 8.3–10.6)
CHLORIDE SERPL-SCNC: 88 MMOL/L (ref 99–110)
CO2 SERPL-SCNC: 25 MMOL/L (ref 21–32)
CREAT SERPL-MCNC: 5.6 MG/DL (ref 0.9–1.3)
DEPRECATED RDW RBC AUTO: 17.9 % (ref 12.4–15.4)
GFR SERPLBLD CREATININE-BSD FMLA CKD-EPI: 11 ML/MIN/{1.73_M2}
GLUCOSE BLD-MCNC: 164 MG/DL (ref 70–99)
GLUCOSE BLD-MCNC: 193 MG/DL (ref 70–99)
GLUCOSE BLD-MCNC: 196 MG/DL (ref 70–99)
GLUCOSE BLD-MCNC: 198 MG/DL (ref 70–99)
GLUCOSE SERPL-MCNC: 225 MG/DL (ref 70–99)
HCT VFR BLD AUTO: 27.1 % (ref 40.5–52.5)
HGB BLD-MCNC: 8.7 G/DL (ref 13.5–17.5)
MAGNESIUM SERPL-MCNC: 2.1 MG/DL (ref 1.8–2.4)
MCH RBC QN AUTO: 29.6 PG (ref 26–34)
MCHC RBC AUTO-ENTMCNC: 32.2 G/DL (ref 31–36)
MCV RBC AUTO: 91.7 FL (ref 80–100)
MYCOBACTERIUM SPEC CULT: NORMAL
PERFORMED ON: ABNORMAL
PHOSPHATE SERPL-MCNC: 4.3 MG/DL (ref 2.5–4.9)
PLATELET # BLD AUTO: 323 K/UL (ref 135–450)
PMV BLD AUTO: 7.2 FL (ref 5–10.5)
POTASSIUM SERPL-SCNC: 5 MMOL/L (ref 3.5–5.1)
RBC # BLD AUTO: 2.96 M/UL (ref 4.2–5.9)
SODIUM SERPL-SCNC: 127 MMOL/L (ref 136–145)
WBC # BLD AUTO: 7 K/UL (ref 4–11)

## 2023-06-06 PROCEDURE — 6370000000 HC RX 637 (ALT 250 FOR IP): Performed by: NURSE PRACTITIONER

## 2023-06-06 PROCEDURE — 36415 COLL VENOUS BLD VENIPUNCTURE: CPT

## 2023-06-06 PROCEDURE — 6370000000 HC RX 637 (ALT 250 FOR IP): Performed by: PODIATRIST

## 2023-06-06 PROCEDURE — 1200000000 HC SEMI PRIVATE

## 2023-06-06 PROCEDURE — 83735 ASSAY OF MAGNESIUM: CPT

## 2023-06-06 PROCEDURE — 2580000003 HC RX 258: Performed by: PODIATRIST

## 2023-06-06 PROCEDURE — 85027 COMPLETE CBC AUTOMATED: CPT

## 2023-06-06 PROCEDURE — 2500000003 HC RX 250 WO HCPCS: Performed by: PODIATRIST

## 2023-06-06 PROCEDURE — 80069 RENAL FUNCTION PANEL: CPT

## 2023-06-06 RX ADMIN — CALCIUM ACETATE 667 MG: 667 CAPSULE ORAL at 16:11

## 2023-06-06 RX ADMIN — OXYCODONE HYDROCHLORIDE 10 MG: 5 TABLET ORAL at 19:48

## 2023-06-06 RX ADMIN — GABAPENTIN 100 MG: 100 CAPSULE ORAL at 19:42

## 2023-06-06 RX ADMIN — APIXABAN 5 MG: 5 TABLET, FILM COATED ORAL at 19:42

## 2023-06-06 RX ADMIN — OXYCODONE HYDROCHLORIDE 10 MG: 5 TABLET ORAL at 05:10

## 2023-06-06 RX ADMIN — ISOSORBIDE DINITRATE 20 MG: 10 TABLET ORAL at 08:03

## 2023-06-06 RX ADMIN — PRAVASTATIN SODIUM 40 MG: 40 TABLET ORAL at 08:03

## 2023-06-06 RX ADMIN — ISOSORBIDE DINITRATE 20 MG: 10 TABLET ORAL at 19:42

## 2023-06-06 RX ADMIN — OXYCODONE HYDROCHLORIDE 10 MG: 5 TABLET ORAL at 13:10

## 2023-06-06 RX ADMIN — METOPROLOL SUCCINATE 25 MG: 25 TABLET, EXTENDED RELEASE ORAL at 08:03

## 2023-06-06 RX ADMIN — DULOXETINE HYDROCHLORIDE 60 MG: 60 CAPSULE, DELAYED RELEASE ORAL at 08:03

## 2023-06-06 RX ADMIN — CALCIUM ACETATE 667 MG: 667 CAPSULE ORAL at 08:03

## 2023-06-06 RX ADMIN — SODIUM CHLORIDE, PRESERVATIVE FREE 10 ML: 5 INJECTION INTRAVENOUS at 08:04

## 2023-06-06 RX ADMIN — TORSEMIDE 100 MG: 100 TABLET ORAL at 13:10

## 2023-06-06 RX ADMIN — CALCIUM ACETATE 667 MG: 667 CAPSULE ORAL at 13:10

## 2023-06-06 RX ADMIN — SODIUM CHLORIDE, PRESERVATIVE FREE 10 ML: 5 INJECTION INTRAVENOUS at 19:42

## 2023-06-06 RX ADMIN — APIXABAN 5 MG: 5 TABLET, FILM COATED ORAL at 08:03

## 2023-06-06 RX ADMIN — PANTOPRAZOLE SODIUM 40 MG: 40 TABLET, DELAYED RELEASE ORAL at 05:09

## 2023-06-06 RX ADMIN — GABAPENTIN 100 MG: 100 CAPSULE ORAL at 13:10

## 2023-06-06 RX ADMIN — OXYCODONE HYDROCHLORIDE 10 MG: 5 TABLET ORAL at 00:36

## 2023-06-06 RX ADMIN — GABAPENTIN 100 MG: 100 CAPSULE ORAL at 08:03

## 2023-06-06 RX ADMIN — ISOSORBIDE DINITRATE 20 MG: 10 TABLET ORAL at 13:10

## 2023-06-06 RX ADMIN — CLOPIDOGREL BISULFATE 75 MG: 75 TABLET ORAL at 08:03

## 2023-06-06 ASSESSMENT — PAIN DESCRIPTION - ORIENTATION
ORIENTATION: RIGHT
ORIENTATION: RIGHT

## 2023-06-06 ASSESSMENT — PAIN DESCRIPTION - LOCATION
LOCATION: FOOT;BACK
LOCATION: BACK;FOOT

## 2023-06-06 ASSESSMENT — PAIN SCALES - GENERAL
PAINLEVEL_OUTOF10: 10
PAINLEVEL_OUTOF10: 10
PAINLEVEL_OUTOF10: 9

## 2023-06-06 ASSESSMENT — PAIN DESCRIPTION - DESCRIPTORS
DESCRIPTORS: DISCOMFORT
DESCRIPTORS: DISCOMFORT

## 2023-06-06 NOTE — CARE COORDINATION
[facility] has denied pt d/t dialysis as the barrier. [address] with Emily Alaniz is still reviewing case. She is reaching out to pt's secondary insurance that provides transport to see how hard it is to switch this service to their facility. Tracie An will update CM .     Ty Lino RN

## 2023-06-06 NOTE — CARE COORDINATION
CM sent referrals to [facility] and [address] [place] [address] for review.  Sophia Lazaro RN

## 2023-06-06 NOTE — PLAN OF CARE
Problem: Discharge Planning  Goal: Discharge to home or other facility with appropriate resources  Outcome: Progressing     Problem: Pain  Goal: Verbalizes/displays adequate comfort level or baseline comfort level  Outcome: Progressing     Problem: Safety - Adult  Goal: Free from fall injury  Outcome: Progressing     Problem: Skin/Tissue Integrity - Adult  Goal: Skin integrity remains intact  Outcome: Progressing

## 2023-06-06 NOTE — FLOWSHEET NOTE
06/05/23 2136   Assessment   Charting Type Shift assessment   Psychosocial   Psychosocial (WDL) X   Patient Behaviors Inappropriate   Neurological   Neuro (WDL) X   Level of Consciousness 0   Orientation Level Oriented X4   Cognition Follows commands; Impulsive   Speech Clear   R Pupil Size (mm) 3   R Pupil Shape Round   R Pupil Reaction Brisk   L Pupil Size (mm) 3   L Pupil Shape Round   L Pupil Reaction Brisk   R Hand  Strong   L Hand  Strong   R Foot Dorsiflexion Moderate   L Foot Dorsiflexion Moderate   R Foot Plantar Flexion Weak   L Foot Plantar Flexion Weak   RUE Motor Response Responds to command   LUE Motor Response Responds to command   RLE Motor Response Responds to command   LLE Motor Response Responds to command   Tongue Deviation None   [place] Coma Scale   Eye Opening 4   Best Verbal Response 5   Best Motor Response 6   Robert Coma Scale Score 15   Sensory   RUE Sensation  Numbness;Tingling  (baseline)   LUE Sensation  Numbness;Tingling  (basline)   RLE Sensation  Numbness;Tingling  (baseline)   LLE Sensation  Numbness;Tingling  (baseline)   HEENT (Head, Ears, Eyes, Nose, & Throat)   HEENT (WDL) WDL   Respiratory   Respiratory (WDL) X   Subcutaneous Air/Crepitus None   Respiratory Pattern Regular   Respiratory Depth Normal   Respiratory Quality/Effort Unlabored   Chest Assessment Chest expansion symmetrical   L Breath Sounds Diminished   R Breath Sounds Diminished   Level of Activity/Mobility 2   Breath Sounds   Right Upper Lobe Diminished   Right Middle Lobe Diminished   Right Lower Lobe Diminished   Left Upper Lobe Diminished   Left Lower Lobe Diminished   Cardiac   Cardiac (WDL) WDL   Cardiac Rhythm Sinus rhythm   Cardiac Regularity Regular   Heart Sounds S1, S2   Gastrointestinal   Abdominal (WDL) X   RUQ Bowel Sounds Active   LUQ Bowel Sounds Active   RLQ Bowel Sounds Active   LLQ Bowel Sounds Active   Abdomen Inspection Obese;Rounded   Genitourinary   Genitourinary (WDL) X  (Olguric)

## 2023-06-06 NOTE — CARE COORDINATION
Cm spoke with patient face to face at bedside. Patient has a few preferences for SNF 1) [facility], 2) [facility] and 3) [address] [place] [address]. CM acknowledged. 1104 CM sent referrals to [facility] and [facility] awaiting review and decision.     Tan Robertson RN

## 2023-06-07 LAB
ALBUMIN SERPL-MCNC: 3.3 G/DL (ref 3.4–5)
ANION GAP SERPL CALCULATED.3IONS-SCNC: 14 MMOL/L (ref 3–16)
BUN SERPL-MCNC: 73 MG/DL (ref 7–20)
CALCIUM SERPL-MCNC: 9.7 MG/DL (ref 8.3–10.6)
CHLORIDE SERPL-SCNC: 88 MMOL/L (ref 99–110)
CO2 SERPL-SCNC: 24 MMOL/L (ref 21–32)
CREAT SERPL-MCNC: 7 MG/DL (ref 0.9–1.3)
DEPRECATED RDW RBC AUTO: 17.7 % (ref 12.4–15.4)
GFR SERPLBLD CREATININE-BSD FMLA CKD-EPI: 9 ML/MIN/{1.73_M2}
GLUCOSE BLD-MCNC: 190 MG/DL (ref 70–99)
GLUCOSE BLD-MCNC: 199 MG/DL (ref 70–99)
GLUCOSE BLD-MCNC: 209 MG/DL (ref 70–99)
GLUCOSE BLD-MCNC: 268 MG/DL (ref 70–99)
GLUCOSE SERPL-MCNC: 183 MG/DL (ref 70–99)
HBV SURFACE AB SERPL IA-ACNC: 19.21 MIU/ML
HBV SURFACE AG SERPL QL IA: NORMAL
HCT VFR BLD AUTO: 24.9 % (ref 40.5–52.5)
HGB BLD-MCNC: 8.3 G/DL (ref 13.5–17.5)
MCH RBC QN AUTO: 29.9 PG (ref 26–34)
MCHC RBC AUTO-ENTMCNC: 33.2 G/DL (ref 31–36)
MCV RBC AUTO: 90 FL (ref 80–100)
PERFORMED ON: ABNORMAL
PHOSPHATE SERPL-MCNC: 5.5 MG/DL (ref 2.5–4.9)
PLATELET # BLD AUTO: 295 K/UL (ref 135–450)
PMV BLD AUTO: 7 FL (ref 5–10.5)
POTASSIUM SERPL-SCNC: 5.9 MMOL/L (ref 3.5–5.1)
RBC # BLD AUTO: 2.77 M/UL (ref 4.2–5.9)
SODIUM SERPL-SCNC: 126 MMOL/L (ref 136–145)
WBC # BLD AUTO: 8.3 K/UL (ref 4–11)

## 2023-06-07 PROCEDURE — 6360000002 HC RX W HCPCS

## 2023-06-07 PROCEDURE — 85027 COMPLETE CBC AUTOMATED: CPT

## 2023-06-07 PROCEDURE — 2700000000 HC OXYGEN THERAPY PER DAY

## 2023-06-07 PROCEDURE — 6370000000 HC RX 637 (ALT 250 FOR IP): Performed by: NURSE PRACTITIONER

## 2023-06-07 PROCEDURE — 90935 HEMODIALYSIS ONE EVALUATION: CPT

## 2023-06-07 PROCEDURE — 36415 COLL VENOUS BLD VENIPUNCTURE: CPT

## 2023-06-07 PROCEDURE — 6370000000 HC RX 637 (ALT 250 FOR IP): Performed by: PODIATRIST

## 2023-06-07 PROCEDURE — 80069 RENAL FUNCTION PANEL: CPT

## 2023-06-07 PROCEDURE — 1200000000 HC SEMI PRIVATE

## 2023-06-07 PROCEDURE — 94761 N-INVAS EAR/PLS OXIMETRY MLT: CPT

## 2023-06-07 PROCEDURE — 2500000003 HC RX 250 WO HCPCS: Performed by: PODIATRIST

## 2023-06-07 PROCEDURE — 2580000003 HC RX 258: Performed by: PODIATRIST

## 2023-06-07 RX ORDER — HEPARIN SODIUM 1000 [USP'U]/ML
INJECTION, SOLUTION INTRAVENOUS; SUBCUTANEOUS
Status: COMPLETED
Start: 2023-06-07 | End: 2023-06-07

## 2023-06-07 RX ADMIN — INSULIN LISPRO 4 UNITS: 100 INJECTION, SOLUTION INTRAVENOUS; SUBCUTANEOUS at 16:50

## 2023-06-07 RX ADMIN — ISOSORBIDE DINITRATE 20 MG: 10 TABLET ORAL at 13:29

## 2023-06-07 RX ADMIN — PANTOPRAZOLE SODIUM 40 MG: 40 TABLET, DELAYED RELEASE ORAL at 05:06

## 2023-06-07 RX ADMIN — CALCIUM ACETATE 667 MG: 667 CAPSULE ORAL at 15:32

## 2023-06-07 RX ADMIN — EPOETIN ALFA-EPBX 10000 UNITS: 10000 INJECTION, SOLUTION INTRAVENOUS; SUBCUTANEOUS at 09:49

## 2023-06-07 RX ADMIN — OXYCODONE HYDROCHLORIDE 10 MG: 5 TABLET ORAL at 08:06

## 2023-06-07 RX ADMIN — OXYCODONE HYDROCHLORIDE 10 MG: 5 TABLET ORAL at 19:34

## 2023-06-07 RX ADMIN — DULOXETINE HYDROCHLORIDE 60 MG: 60 CAPSULE, DELAYED RELEASE ORAL at 07:58

## 2023-06-07 RX ADMIN — TRAZODONE HYDROCHLORIDE 50 MG: 50 TABLET ORAL at 22:22

## 2023-06-07 RX ADMIN — APIXABAN 5 MG: 5 TABLET, FILM COATED ORAL at 19:34

## 2023-06-07 RX ADMIN — HEPARIN SODIUM 3600 UNITS: 1000 INJECTION INTRAVENOUS; SUBCUTANEOUS at 09:50

## 2023-06-07 RX ADMIN — OXYCODONE HYDROCHLORIDE 10 MG: 5 TABLET ORAL at 13:29

## 2023-06-07 RX ADMIN — SODIUM CHLORIDE, PRESERVATIVE FREE 10 ML: 5 INJECTION INTRAVENOUS at 07:58

## 2023-06-07 RX ADMIN — SODIUM CHLORIDE, PRESERVATIVE FREE 10 ML: 5 INJECTION INTRAVENOUS at 19:34

## 2023-06-07 RX ADMIN — OXYCODONE HYDROCHLORIDE 10 MG: 5 TABLET ORAL at 02:14

## 2023-06-07 RX ADMIN — ISOSORBIDE DINITRATE 20 MG: 10 TABLET ORAL at 07:58

## 2023-06-07 RX ADMIN — GABAPENTIN 100 MG: 100 CAPSULE ORAL at 07:57

## 2023-06-07 RX ADMIN — CALCIUM ACETATE 667 MG: 667 CAPSULE ORAL at 07:57

## 2023-06-07 RX ADMIN — CALCIUM ACETATE 667 MG: 667 CAPSULE ORAL at 13:29

## 2023-06-07 RX ADMIN — PRAVASTATIN SODIUM 40 MG: 40 TABLET ORAL at 07:58

## 2023-06-07 RX ADMIN — GABAPENTIN 100 MG: 100 CAPSULE ORAL at 13:29

## 2023-06-07 RX ADMIN — ISOSORBIDE DINITRATE 20 MG: 10 TABLET ORAL at 19:34

## 2023-06-07 RX ADMIN — APIXABAN 5 MG: 5 TABLET, FILM COATED ORAL at 07:57

## 2023-06-07 RX ADMIN — METOPROLOL SUCCINATE 25 MG: 25 TABLET, EXTENDED RELEASE ORAL at 07:58

## 2023-06-07 RX ADMIN — GABAPENTIN 100 MG: 100 CAPSULE ORAL at 19:33

## 2023-06-07 RX ADMIN — CLOPIDOGREL BISULFATE 75 MG: 75 TABLET ORAL at 08:00

## 2023-06-07 RX ADMIN — TORSEMIDE 100 MG: 100 TABLET ORAL at 08:06

## 2023-06-07 ASSESSMENT — PAIN DESCRIPTION - PAIN TYPE: TYPE: ACUTE PAIN

## 2023-06-07 ASSESSMENT — PAIN SCALES - GENERAL
PAINLEVEL_OUTOF10: 9
PAINLEVEL_OUTOF10: 10
PAINLEVEL_OUTOF10: 7

## 2023-06-07 ASSESSMENT — PAIN DESCRIPTION - ORIENTATION
ORIENTATION: RIGHT
ORIENTATION: RIGHT

## 2023-06-07 ASSESSMENT — PAIN DESCRIPTION - LOCATION
LOCATION: FOOT
LOCATION: BACK;FOOT
LOCATION: BACK;FOOT

## 2023-06-07 ASSESSMENT — PAIN DESCRIPTION - DESCRIPTORS
DESCRIPTORS: ACHING;DISCOMFORT
DESCRIPTORS: ACHING;DISCOMFORT

## 2023-06-07 ASSESSMENT — PAIN DESCRIPTION - ONSET: ONSET: ON-GOING

## 2023-06-07 ASSESSMENT — PAIN DESCRIPTION - FREQUENCY: FREQUENCY: CONTINUOUS

## 2023-06-07 ASSESSMENT — PAIN - FUNCTIONAL ASSESSMENT: PAIN_FUNCTIONAL_ASSESSMENT: PREVENTS OR INTERFERES SOME ACTIVE ACTIVITIES AND ADLS

## 2023-06-07 NOTE — FLOWSHEET NOTE
06/07/23 0930 06/07/23 1300   Treatment   Time On 0936  --    Time Off  --  1257   Treatment Goal 4000  --    Vital Signs   BP (!) 172/85 129/66   Temp 97.6 °F (36.4 °C) 97.4 °F (36.3 °C)   Pulse 78 77   Respirations  --  18   Weight - Scale 247 lb 9.2 oz (112.3 kg) 239 lb 13.8 oz (108.8 kg)   Weight Method Bed scale Bed scale   Percent Weight Change 0.03 -3.12   Dry Weight  --  219 lb 5.7 oz (99.5 kg)   Post-Hemodialysis Assessment   Rinseback Volume (ml)  --  400 ml   Blood Volume Processed (Liters)  --  70.9 l/min   Dialyzer Clearance  --  Lightly streaked   Duration of Treatment (minutes)  --  210 minutes   Hemodialysis Intake (ml)  --  400 ml   Hemodialysis Output (ml)  --  3900 ml   NET Removed (ml)  --  3500   Tolerated Treatment  --  Good     Treatment time: 3.5 hours  Net UF:  3500  ml     Pre weight: 112.3 kg   Post weight: 108.8 kg  EDW: 99.5 kg     Access used: Left chest wall TDC  Access function: Good with  ml/min     Medications or blood products given: Retacrit 10,000 units and heparin dwells to line  at end of tx. Regular outpatient schedule: [facility]     Summary of response to treatment:  Pt tolerated well with HD, vital signs stable,     Cirt Line: Non functioning. Copy of dialysis treatment record placed in chart, to be scanned into EMR. Report to Yaneth Holcomb RN.

## 2023-06-07 NOTE — CARE COORDINATION
Referred to patient for d/c planning. Spoke to patient. List of providers given and reviewed. Patient has been declined by [facility] has not accepted at this point. Cyn[facility] is out of network with insurance. Patient agreeable to referral to [facility]. Referral to [facility], patient accepted. They are working on setting up his HD. Need Heb B labs, HD drawing today. Patient notified and agreeable to [facility].   Electronically signed by NINO Moffett 6/7/2023 at 10:23 AM

## 2023-06-08 ENCOUNTER — HOSPITAL ENCOUNTER (OUTPATIENT)
Dept: WOUND CARE | Age: 53
Discharge: HOME | End: 2023-06-08

## 2023-06-08 LAB
ALBUMIN SERPL-MCNC: 3.2 G/DL (ref 3.4–5)
ANION GAP SERPL CALCULATED.3IONS-SCNC: 14 MMOL/L (ref 3–16)
BUN SERPL-MCNC: 49 MG/DL (ref 7–20)
CALCIUM SERPL-MCNC: 9.6 MG/DL (ref 8.3–10.6)
CHLORIDE SERPL-SCNC: 90 MMOL/L (ref 99–110)
CO2 SERPL-SCNC: 24 MMOL/L (ref 21–32)
CREAT SERPL-MCNC: 5.1 MG/DL (ref 0.9–1.3)
DEPRECATED RDW RBC AUTO: 18.2 % (ref 12.4–15.4)
GFR SERPLBLD CREATININE-BSD FMLA CKD-EPI: 13 ML/MIN/{1.73_M2}
GLUCOSE BLD-MCNC: 149 MG/DL (ref 70–99)
GLUCOSE BLD-MCNC: 227 MG/DL (ref 70–99)
GLUCOSE BLD-MCNC: 266 MG/DL (ref 70–99)
GLUCOSE SERPL-MCNC: 143 MG/DL (ref 70–99)
HCT VFR BLD AUTO: 27.5 % (ref 40.5–52.5)
HGB BLD-MCNC: 9 G/DL (ref 13.5–17.5)
MAGNESIUM SERPL-MCNC: 2 MG/DL (ref 1.8–2.4)
MCH RBC QN AUTO: 29.8 PG (ref 26–34)
MCHC RBC AUTO-ENTMCNC: 32.7 G/DL (ref 31–36)
MCV RBC AUTO: 91.3 FL (ref 80–100)
PERFORMED ON: ABNORMAL
PHOSPHATE SERPL-MCNC: 4.1 MG/DL (ref 2.5–4.9)
PLATELET # BLD AUTO: 264 K/UL (ref 135–450)
PMV BLD AUTO: 7.3 FL (ref 5–10.5)
POTASSIUM SERPL-SCNC: 4.9 MMOL/L (ref 3.5–5.1)
RBC # BLD AUTO: 3.01 M/UL (ref 4.2–5.9)
SODIUM SERPL-SCNC: 128 MMOL/L (ref 136–145)
WBC # BLD AUTO: 6.8 K/UL (ref 4–11)

## 2023-06-08 PROCEDURE — 97530 THERAPEUTIC ACTIVITIES: CPT

## 2023-06-08 PROCEDURE — 6370000000 HC RX 637 (ALT 250 FOR IP): Performed by: PODIATRIST

## 2023-06-08 PROCEDURE — 6370000000 HC RX 637 (ALT 250 FOR IP): Performed by: NURSE PRACTITIONER

## 2023-06-08 PROCEDURE — 1200000000 HC SEMI PRIVATE

## 2023-06-08 PROCEDURE — 80069 RENAL FUNCTION PANEL: CPT

## 2023-06-08 PROCEDURE — 2700000000 HC OXYGEN THERAPY PER DAY

## 2023-06-08 PROCEDURE — 83735 ASSAY OF MAGNESIUM: CPT

## 2023-06-08 PROCEDURE — 2500000003 HC RX 250 WO HCPCS: Performed by: PODIATRIST

## 2023-06-08 PROCEDURE — 36415 COLL VENOUS BLD VENIPUNCTURE: CPT

## 2023-06-08 PROCEDURE — 97110 THERAPEUTIC EXERCISES: CPT

## 2023-06-08 PROCEDURE — 85027 COMPLETE CBC AUTOMATED: CPT

## 2023-06-08 PROCEDURE — 2580000003 HC RX 258: Performed by: PODIATRIST

## 2023-06-08 PROCEDURE — 86704 HEP B CORE ANTIBODY TOTAL: CPT

## 2023-06-08 PROCEDURE — 94761 N-INVAS EAR/PLS OXIMETRY MLT: CPT

## 2023-06-08 RX ADMIN — CALCIUM ACETATE 667 MG: 667 CAPSULE ORAL at 16:09

## 2023-06-08 RX ADMIN — APIXABAN 5 MG: 5 TABLET, FILM COATED ORAL at 20:56

## 2023-06-08 RX ADMIN — DULOXETINE HYDROCHLORIDE 60 MG: 60 CAPSULE, DELAYED RELEASE ORAL at 07:48

## 2023-06-08 RX ADMIN — OXYCODONE HYDROCHLORIDE 10 MG: 5 TABLET ORAL at 05:06

## 2023-06-08 RX ADMIN — OXYCODONE HYDROCHLORIDE 10 MG: 5 TABLET ORAL at 15:20

## 2023-06-08 RX ADMIN — APIXABAN 5 MG: 5 TABLET, FILM COATED ORAL at 07:48

## 2023-06-08 RX ADMIN — ISOSORBIDE DINITRATE 20 MG: 10 TABLET ORAL at 07:47

## 2023-06-08 RX ADMIN — PRAVASTATIN SODIUM 40 MG: 40 TABLET ORAL at 07:47

## 2023-06-08 RX ADMIN — SODIUM CHLORIDE, PRESERVATIVE FREE 10 ML: 5 INJECTION INTRAVENOUS at 20:57

## 2023-06-08 RX ADMIN — SODIUM CHLORIDE, PRESERVATIVE FREE 10 ML: 5 INJECTION INTRAVENOUS at 10:29

## 2023-06-08 RX ADMIN — TRAZODONE HYDROCHLORIDE 50 MG: 50 TABLET ORAL at 20:56

## 2023-06-08 RX ADMIN — GABAPENTIN 100 MG: 100 CAPSULE ORAL at 20:56

## 2023-06-08 RX ADMIN — METOPROLOL SUCCINATE 25 MG: 25 TABLET, EXTENDED RELEASE ORAL at 07:48

## 2023-06-08 RX ADMIN — GABAPENTIN 100 MG: 100 CAPSULE ORAL at 14:01

## 2023-06-08 RX ADMIN — TORSEMIDE 100 MG: 100 TABLET ORAL at 10:31

## 2023-06-08 RX ADMIN — CALCIUM ACETATE 667 MG: 667 CAPSULE ORAL at 10:28

## 2023-06-08 RX ADMIN — ISOSORBIDE DINITRATE 20 MG: 10 TABLET ORAL at 20:56

## 2023-06-08 RX ADMIN — ISOSORBIDE DINITRATE 20 MG: 10 TABLET ORAL at 14:01

## 2023-06-08 RX ADMIN — CALCIUM ACETATE 667 MG: 667 CAPSULE ORAL at 07:48

## 2023-06-08 RX ADMIN — GABAPENTIN 100 MG: 100 CAPSULE ORAL at 07:48

## 2023-06-08 RX ADMIN — PANTOPRAZOLE SODIUM 40 MG: 40 TABLET, DELAYED RELEASE ORAL at 05:09

## 2023-06-08 RX ADMIN — OXYCODONE HYDROCHLORIDE 10 MG: 5 TABLET ORAL at 10:28

## 2023-06-08 RX ADMIN — INSULIN LISPRO 4 UNITS: 100 INJECTION, SOLUTION INTRAVENOUS; SUBCUTANEOUS at 12:01

## 2023-06-08 RX ADMIN — CLOPIDOGREL BISULFATE 75 MG: 75 TABLET ORAL at 07:48

## 2023-06-08 ASSESSMENT — PAIN SCALES - GENERAL
PAINLEVEL_OUTOF10: 9
PAINLEVEL_OUTOF10: 0
PAINLEVEL_OUTOF10: 5
PAINLEVEL_OUTOF10: 10
PAINLEVEL_OUTOF10: 10
PAINLEVEL_OUTOF10: 9
PAINLEVEL_OUTOF10: 7

## 2023-06-08 ASSESSMENT — PAIN DESCRIPTION - LOCATION
LOCATION: BACK;FOOT;HIP
LOCATION: FOOT
LOCATION: BACK;HIP;FOOT

## 2023-06-08 ASSESSMENT — PAIN - FUNCTIONAL ASSESSMENT: PAIN_FUNCTIONAL_ASSESSMENT: PREVENTS OR INTERFERES SOME ACTIVE ACTIVITIES AND ADLS

## 2023-06-08 ASSESSMENT — PAIN DESCRIPTION - DESCRIPTORS
DESCRIPTORS: CRAMPING;ACHING
DESCRIPTORS: ACHING
DESCRIPTORS: ACHING

## 2023-06-08 ASSESSMENT — PAIN DESCRIPTION - ORIENTATION
ORIENTATION: RIGHT;MID;LOWER
ORIENTATION: RIGHT;MID;LOWER
ORIENTATION: RIGHT

## 2023-06-08 ASSESSMENT — PAIN SCALES - WONG BAKER: WONGBAKER_NUMERICALRESPONSE: 0

## 2023-06-08 NOTE — CARE COORDINATION
Need hep B Core antigen lab, ordered. Need PT/OT for precert. PT notified. Will continue to plan for [facility] on d/c. Electronically signed by NINO Crooks on 6/8/2023 at 1:48 AM     Precert for SNF submitted via Natalia Suarez, [id number]. Currently pending.   Electronically signed by NINO Crooks on 6/8/2023 at 12:55 PM

## 2023-06-08 NOTE — PLAN OF CARE
Problem: Discharge Planning  Goal: Discharge to home or other facility with appropriate resources  6/8/2023 0948 by Armin Patel RN  Outcome: Completed  Flowsheets (Taken 6/8/2023 6499)  Discharge to home or other facility with appropriate resources:   Identify barriers to discharge with patient and caregiver   Arrange for needed discharge resources and transportation as appropriate   Identify discharge learning needs (meds, wound care, etc)  6/7/2023 2050 by Miguel Angel Stanley RN  Outcome: Progressing  Flowsheets (Taken 6/7/2023 1931)  Discharge to home or other facility with appropriate resources: Identify barriers to discharge with patient and caregiver     Problem: Pain  Goal: Verbalizes/displays adequate comfort level or baseline comfort level  6/8/2023 0948 by Armin Patel RN  Outcome: Completed  Flowsheets  Taken 6/8/2023 0745 by Armin Patel RN  Verbalizes/displays adequate comfort level or baseline comfort level:   Encourage patient to monitor pain and request assistance   Assess pain using appropriate pain scale   Implement non-pharmacological measures as appropriate and evaluate response   Administer analgesics based on type and severity of pain and evaluate response  Taken 6/8/2023 0010 by Miguel Angel Stanley RN  Verbalizes/displays adequate comfort level or baseline comfort level: Encourage patient to monitor pain and request assistance  6/7/2023 2050 by Miguel Angel Stanley RN  Outcome: Progressing  Flowsheets (Taken 6/7/2023 1930)  Verbalizes/displays adequate comfort level or baseline comfort level: Encourage patient to monitor pain and request assistance     Problem: Safety - Adult  Goal: Free from fall injury  Outcome: Completed     Problem: ABCDS Injury Assessment  Goal: Absence of physical injury  Outcome: Completed     Problem: Self Harm/Suicidality  Goal: Will have no self-injury during hospital stay  Description: INTERVENTIONS:  1.   Ensure constant observer at bedside with Q15M safety

## 2023-06-08 NOTE — PLAN OF CARE
Problem: Discharge Planning  Goal: Discharge to home or other facility with appropriate resources  Outcome: Progressing  Flowsheets (Taken 6/7/2023 1931)  Discharge to home or other facility with appropriate resources: Identify barriers to discharge with patient and caregiver     Problem: Pain  Goal: Verbalizes/displays adequate comfort level or baseline comfort level  6/7/2023 2050 by Angélica Harris RN  Outcome: Progressing  Flowsheets (Taken 6/7/2023 1930)  Verbalizes/displays adequate comfort level or baseline comfort level: Encourage patient to monitor pain and request assistance  6/7/2023 1007 by Stacie Rosa RN  Outcome: Progressing

## 2023-06-09 VITALS
SYSTOLIC BLOOD PRESSURE: 111 MMHG | TEMPERATURE: 97.6 F | DIASTOLIC BLOOD PRESSURE: 64 MMHG | HEART RATE: 70 BPM | HEIGHT: 69 IN | WEIGHT: 246.47 LBS | BODY MASS INDEX: 36.51 KG/M2 | RESPIRATION RATE: 18 BRPM | OXYGEN SATURATION: 100 %

## 2023-06-09 LAB
ALBUMIN SERPL-MCNC: 3.6 G/DL (ref 3.4–5)
ANION GAP SERPL CALCULATED.3IONS-SCNC: 14 MMOL/L (ref 3–16)
BUN SERPL-MCNC: 58 MG/DL (ref 7–20)
CALCIUM SERPL-MCNC: 9.5 MG/DL (ref 8.3–10.6)
CHLORIDE SERPL-SCNC: 87 MMOL/L (ref 99–110)
CO2 SERPL-SCNC: 25 MMOL/L (ref 21–32)
CREAT SERPL-MCNC: 5.7 MG/DL (ref 0.9–1.3)
DEPRECATED RDW RBC AUTO: 17.3 % (ref 12.4–15.4)
GFR SERPLBLD CREATININE-BSD FMLA CKD-EPI: 11 ML/MIN/{1.73_M2}
GLUCOSE BLD-MCNC: 210 MG/DL (ref 70–99)
GLUCOSE BLD-MCNC: 211 MG/DL (ref 70–99)
GLUCOSE BLD-MCNC: 279 MG/DL (ref 70–99)
GLUCOSE SERPL-MCNC: 215 MG/DL (ref 70–99)
HBV CORE AB SERPL QL IA: NEGATIVE
HCT VFR BLD AUTO: 25.6 % (ref 40.5–52.5)
HGB BLD-MCNC: 8.3 G/DL (ref 13.5–17.5)
MCH RBC QN AUTO: 29.2 PG (ref 26–34)
MCHC RBC AUTO-ENTMCNC: 32.4 G/DL (ref 31–36)
MCV RBC AUTO: 90 FL (ref 80–100)
PERFORMED ON: ABNORMAL
PHOSPHATE SERPL-MCNC: 4.1 MG/DL (ref 2.5–4.9)
PLATELET # BLD AUTO: 278 K/UL (ref 135–450)
PMV BLD AUTO: 6.8 FL (ref 5–10.5)
POTASSIUM SERPL-SCNC: 4.8 MMOL/L (ref 3.5–5.1)
RBC # BLD AUTO: 2.84 M/UL (ref 4.2–5.9)
SODIUM SERPL-SCNC: 126 MMOL/L (ref 136–145)
WBC # BLD AUTO: 8.3 K/UL (ref 4–11)

## 2023-06-09 PROCEDURE — 6370000000 HC RX 637 (ALT 250 FOR IP): Performed by: INTERNAL MEDICINE

## 2023-06-09 PROCEDURE — 2580000003 HC RX 258: Performed by: PODIATRIST

## 2023-06-09 PROCEDURE — 2700000000 HC OXYGEN THERAPY PER DAY

## 2023-06-09 PROCEDURE — 85027 COMPLETE CBC AUTOMATED: CPT

## 2023-06-09 PROCEDURE — 2500000003 HC RX 250 WO HCPCS: Performed by: PODIATRIST

## 2023-06-09 PROCEDURE — 6370000000 HC RX 637 (ALT 250 FOR IP): Performed by: PODIATRIST

## 2023-06-09 PROCEDURE — 80069 RENAL FUNCTION PANEL: CPT

## 2023-06-09 PROCEDURE — 6370000000 HC RX 637 (ALT 250 FOR IP): Performed by: NURSE PRACTITIONER

## 2023-06-09 PROCEDURE — 90935 HEMODIALYSIS ONE EVALUATION: CPT

## 2023-06-09 PROCEDURE — 94761 N-INVAS EAR/PLS OXIMETRY MLT: CPT

## 2023-06-09 PROCEDURE — 36415 COLL VENOUS BLD VENIPUNCTURE: CPT

## 2023-06-09 PROCEDURE — 94640 AIRWAY INHALATION TREATMENT: CPT

## 2023-06-09 RX ORDER — IPRATROPIUM BROMIDE AND ALBUTEROL SULFATE 2.5; .5 MG/3ML; MG/3ML
1 SOLUTION RESPIRATORY (INHALATION) 4 TIMES DAILY PRN
Status: DISCONTINUED | OUTPATIENT
Start: 2023-06-09 | End: 2023-06-09 | Stop reason: HOSPADM

## 2023-06-09 RX ORDER — OXYCODONE HYDROCHLORIDE 5 MG/1
5 TABLET ORAL EVERY 6 HOURS PRN
Qty: 28 TABLET | Refills: 0 | Status: SHIPPED | OUTPATIENT
Start: 2023-06-09 | End: 2023-06-16

## 2023-06-09 RX ADMIN — DULOXETINE HYDROCHLORIDE 60 MG: 60 CAPSULE, DELAYED RELEASE ORAL at 08:06

## 2023-06-09 RX ADMIN — PRAVASTATIN SODIUM 40 MG: 40 TABLET ORAL at 08:06

## 2023-06-09 RX ADMIN — CLOPIDOGREL BISULFATE 75 MG: 75 TABLET ORAL at 08:06

## 2023-06-09 RX ADMIN — TORSEMIDE 100 MG: 100 TABLET ORAL at 08:06

## 2023-06-09 RX ADMIN — METOPROLOL SUCCINATE 25 MG: 25 TABLET, EXTENDED RELEASE ORAL at 08:06

## 2023-06-09 RX ADMIN — PANTOPRAZOLE SODIUM 40 MG: 40 TABLET, DELAYED RELEASE ORAL at 08:06

## 2023-06-09 RX ADMIN — GABAPENTIN 100 MG: 100 CAPSULE ORAL at 08:06

## 2023-06-09 RX ADMIN — OXYCODONE HYDROCHLORIDE 10 MG: 5 TABLET ORAL at 14:24

## 2023-06-09 RX ADMIN — OXYCODONE HYDROCHLORIDE 10 MG: 5 TABLET ORAL at 08:06

## 2023-06-09 RX ADMIN — GABAPENTIN 100 MG: 100 CAPSULE ORAL at 14:25

## 2023-06-09 RX ADMIN — CALCIUM ACETATE 667 MG: 667 CAPSULE ORAL at 08:06

## 2023-06-09 RX ADMIN — ISOSORBIDE DINITRATE 20 MG: 10 TABLET ORAL at 08:06

## 2023-06-09 RX ADMIN — APIXABAN 5 MG: 5 TABLET, FILM COATED ORAL at 08:09

## 2023-06-09 RX ADMIN — ISOSORBIDE DINITRATE 20 MG: 10 TABLET ORAL at 14:25

## 2023-06-09 RX ADMIN — IPRATROPIUM BROMIDE AND ALBUTEROL SULFATE 1 DOSE: 2.5; .5 SOLUTION RESPIRATORY (INHALATION) at 06:21

## 2023-06-09 RX ADMIN — SODIUM CHLORIDE, PRESERVATIVE FREE 10 ML: 5 INJECTION INTRAVENOUS at 08:09

## 2023-06-09 RX ADMIN — OXYCODONE HYDROCHLORIDE 10 MG: 5 TABLET ORAL at 01:41

## 2023-06-09 ASSESSMENT — PAIN DESCRIPTION - LOCATION
LOCATION: FOOT
LOCATION: BACK;FOOT

## 2023-06-09 ASSESSMENT — PAIN SCALES - GENERAL
PAINLEVEL_OUTOF10: 4
PAINLEVEL_OUTOF10: 10
PAINLEVEL_OUTOF10: 10
PAINLEVEL_OUTOF10: 9
PAINLEVEL_OUTOF10: 7
PAINLEVEL_OUTOF10: 10

## 2023-06-09 ASSESSMENT — PAIN - FUNCTIONAL ASSESSMENT
PAIN_FUNCTIONAL_ASSESSMENT: PREVENTS OR INTERFERES WITH MANY ACTIVE NOT PASSIVE ACTIVITIES
PAIN_FUNCTIONAL_ASSESSMENT: PREVENTS OR INTERFERES WITH MANY ACTIVE NOT PASSIVE ACTIVITIES
PAIN_FUNCTIONAL_ASSESSMENT: PREVENTS OR INTERFERES SOME ACTIVE ACTIVITIES AND ADLS

## 2023-06-09 ASSESSMENT — PAIN DESCRIPTION - DESCRIPTORS
DESCRIPTORS: ACHING;DISCOMFORT
DESCRIPTORS: ACHING;DISCOMFORT
DESCRIPTORS: ACHING;DISCOMFORT;SHARP

## 2023-06-09 ASSESSMENT — PAIN SCALES - WONG BAKER: WONGBAKER_NUMERICALRESPONSE: 2

## 2023-06-09 ASSESSMENT — PAIN DESCRIPTION - ORIENTATION
ORIENTATION: RIGHT;LOWER

## 2023-06-09 NOTE — PROGRESS NOTES
06/08/23 1505   Encounter Summary   Encounter Overview/Reason  Initial Encounter   Service Provided For: Patient   Referral/Consult From: Rounding   Last Encounter  06/08/23  (Brief introduction while making rounds)   Complexity of Encounter Low
AM Assessment completed. BP (!) 166/79   Pulse 72   Temp 97.4 °F (36.3 °C) (Oral)   Resp 16   Ht 5' 9\" (1.753 m)   Wt 252 lb 6.8 oz (114.5 kg)   SpO2 100%   BMI 37.28 kg/m²     Alert and oriented. C/o ongoing pain in right foot describes as throbbing. Pain Assessment  Pain Assessment: 0-10  Pain Level: 8  Patient's Stated Pain Goal: 5  Pain Location: Foot  Pain Orientation: Right  Pain Descriptors: Throbbing  Functional Pain Assessment: Prevents or interferes some active activities and ADLs  Pain Type: Chronic pain  Non-Pharmaceutical Pain Intervention(s): Declines   Pain/Discomfort is being managed with PRN analgesics per MD orders (See MAR). Patient is able to express and rate pain using numerical scale. Calls appropriately. Plan of care, education and safety measures reviewed and mutually agreed upon with the patient. Needed items including call light in reach and exit alarm in place.        Electronically signed by Barron Coleman RN on 6/5/2023 at 8:11 AM
Assessment completed and medications given per MAR. Patient is A&O and denies any needs at this time. Call light and personal belongings are within reach. Standard safety measures are in place.
Blood sugar 279. Order for 4 units of insulin to cover. Patient refused states that if its \"not 10 units, I don't want it\". \"It wont do anything, what does it even do? \" Educated patient on importance of maintaining good blood sugar control to help with wound healing. Patient reluctantly allowed this nurse to administer 4 units per orders. Consulted diabetic educator.
Comprehensive Nutrition Assessment    Type and Reason for Visit:  Reassess    Nutrition Recommendations/Plan:   Continue NIDA, renal diet and encourage PO intake   Continue nepro BID  Monitor nutrition adequacy, pertinent labs, bowel habits, wt changes, and clinical progress     Malnutrition Assessment:  Malnutrition Status: At risk for malnutrition (Comment) (06/05/23 1211)    Context:  Acute Illness     Findings of the 6 clinical characteristics of malnutrition:  Energy Intake:  Mild decrease in energy intake (Comment)  Fluid Accumulation:  Mild Extremities    Nutrition Assessment:    Follow up: Pt at HD at time of attempted visit. Pt continues on NIDA, renal diet w/ PO intakes of % and 26-76% of meals since admission. Wt varying, pt also +7.5 L since admission. Nepro added BID, will continue. Continue to encourage PO intake, will continue to monitor. Nutrition Related Findings:    BLE + 1 edema, Na 129, K+ 6.4, BUN 87, Cr 7.2, GFR 8, phos 6.3. -242 x 24 hours. + BM 6/3. Wound Type: Surgical Incision, Diabetic Ulcer       Current Nutrition Intake & Therapies:    Average Meal Intake: %, 26-50%, 51-75%  Average Supplements Intake: Unable to assess  ADULT ORAL NUTRITION SUPPLEMENT; Breakfast, Dinner; Renal Oral Supplement  ADULT DIET; Regular; No Added Salt (3-4 gm); Low Potassium (Less than 3000 mg/day); Low Phosphorus (Less than 1000 mg); 1500 ml    Anthropometric Measures:  Height: 5' 9\" (175.3 cm)  Ideal Body Weight (IBW): 160 lbs (73 kg)    Admission Body Weight: 219 lb (99.3 kg) (bed scale)  Current Body Weight: 255 lb (115.7 kg), 159.4 % IBW.  Weight Source: Bed Scale  Current BMI (kg/m2): 37.6                          BMI Categories: Obese Class 2 (BMI 35.0 -39.9)    Estimated Daily Nutrient Needs:  Energy Requirements Based On: Kcal/kg (25-30)  Weight Used for Energy Requirements: Ideal  Energy (kcal/day): 8481-8810 kcal  Weight Used for Protein Requirements: Ideal (1.0-1.2 g/kg)  Protein
Hospital Medicine Progress Note      Date of Admission: 5/22/2023  Hospital Day: 16    Chief Admission Complaint:  Toe Pain     Subjective:  no new c/o    Presenting Admission History: \"Igor Blake is a 52 y.o. male with PMH of ESRD on HD, CAD (s/p stents), PAD, DM II, COPD who presents with right foot pain from his lateral distal right toe and dorsal second toe left foot which ongoing problem but started to get worse few days ago that he couldn't stand on his feet. Pt removed some skin scabbing and noted worsening of pain and reddish/milkish discharge from the area following that. Pt reports fever at home 101.0 measured by his wife. Endorses nausea, but no vomiting or diarrhea. Pt has the same o2 requirement no change. Pt reports chest pain but this is chronic for him; he had cath few weeks ago. Denies lightheadedness, dizziness, cough, dyspnea, palpitations, abd pain, dysuria. At ED, pt was afebrile, hemodynamically stable; on room air. Labs remarkable for Na 129, CRP 84, WBC 10.9, Hgb 10.5, LA 1.1. Had CXR unremarkable. XR foot right/left no evidence of OM. Pt received dose of vanc+cefepime. Discussed with ED, will admit pt for his toe pain\"    Assessment/Plan:      Current Principal Problem:  Ulcer with gangrene, with unspecified severity ([address]      Right foot toe gangrenous ulceration concerning for cellulitis. Hx of severe PAD and ESRD. Doppler may 2023 showed arterial insufficient worse in the right compared to the left. Had laser skin perfusion pressure. Taken to OR 26 May s/p I&D w/ 5th ray amputation w/out complications. Continue PO/IV narcotic analgesia as ordered - titrated up 28 May but titrated back down 1 June and d/c'd 2 June. ESRD - on HD M/W/F. On home Torsemide - continued. Nephrology consulted and appreciated. CHF -  combined sytolic failure w/ reduced EF 25-30% by Echo March 2023. Likely due to hypertensive heart disease.   Continue current medical management and
Hospital Medicine Progress Note      Date of Admission: 5/22/2023  Hospital Day: 16    Chief Admission Complaint:  Toe Pain     Subjective:  no new c/o    Presenting Admission History: \"Igor De La Fuente is a 52 y.o. male with PMH of ESRD on HD, CAD (s/p stents), PAD, DM II, COPD who presents with right foot pain from his lateral distal right toe and dorsal second toe left foot which ongoing problem but started to get worse few days ago that he couldn't stand on his feet. Pt removed some skin scabbing and noted worsening of pain and reddish/milkish discharge from the area following that. Pt reports fever at home 101.0 measured by his wife. Endorses nausea, but no vomiting or diarrhea. Pt has the same o2 requirement no change. Pt reports chest pain but this is chronic for him; he had cath few weeks ago. Denies lightheadedness, dizziness, cough, dyspnea, palpitations, abd pain, dysuria. At ED, pt was afebrile, hemodynamically stable; on room air. Labs remarkable for Na 129, CRP 84, WBC 10.9, Hgb 10.5, LA 1.1. Had CXR unremarkable. XR foot right/left no evidence of OM. Pt received dose of vanc+cefepime. Discussed with ED, will admit pt for his toe pain\"    Assessment/Plan:      Current Principal Problem:  Ulcer with gangrene, with unspecified severity ([address]      Right foot toe gangrenous ulceration concerning for cellulitis. Hx of severe PAD and ESRD. Doppler may 2023 showed arterial insufficient worse in the right compared to the left. Had laser skin perfusion pressure. Taken to OR 26 May s/p I&D w/ 5th ray amputation w/out complications. Continue PO/IV narcotic analgesia as ordered - titrated up 28 May but titrated back down 1 June and d/c'd 2 June. ESRD - on HD M/W/F. On home Torsemide - continued. Nephrology consulted and appreciated. CHF -  combined sytolic failure w/ reduced EF 25-30% by Echo March 2023. Likely due to hypertensive heart disease.   Continue current medical management and
Occupational Therapy  Facility/Department: [place] [address] [facility]  Daily Treatment Note  NAME: Yariel Diaz  : 1970  MRN: [id number]    Date of Service: 2023    Discharge Recommendations:  IP Rehab  OT Equipment Recommendations  Equipment Needed: No  Other: defer to next level of care      Patient Diagnosis(es): The primary encounter diagnosis was Gangrene of right foot ([facility] Diagnoses of Diabetic ulcer of toe of left foot associated with type 2 diabetes mellitus, unspecified ulcer stage ([facility] Supplemental oxygen dependent, and Peripheral vascular disease, unspecified ([facility] were also pertinent to this visit. Therapy discharge recommendations take into account each patient's current medical complexities and are subject to input/oversight from the patient's healthcare team.   Barriers to Home Discharge:   [] Steps to access home entry or bed/bath:   [x] Unable to transfer, ambulate, or propel wheelchair household distances without assist   [] Limited available assist at home upon discharge    [] Patient or family requests d/c to post-acute facility    [x] Poor cognition/safety awareness for d/c to home alone   [x] Unable to maintain ordered weight bearing status    [] Patient with salient signs of long-standing immobility   [x] Other: Decreased ADL status    If pt is unable to be seen after this session, please let this note serve as discharge summary. Please see case management note for discharge disposition. Thank you. Assessment    Assessment: Pt tolerated co-tx fair, requiring max Ax2 for sit<>stand and stand step from EOB to chair. Tolerated x20 BUE ther ex well. Overall, pt continues require max VC to maintain NWB of RLE and is limited by decreased balance, weakenss, activity tolerance, motivation, and safety awareness. Continue to recommend IPR upon d/c to increase pt safety and ADL independence prior to return home. Activity Tolerance: Patient limited by pain; Patient limited by
Occupational Therapy/Physical Therapy  OT/PT follow up attempted, pt leaving for dialysis, will continue to follow per POC.   JOSSIE Martins/АННА Wilkerson, PT, DPT
Patient c/o pain in his back and right foot 10/10 on pain scale. PRN Oxycodone 10 mg given per MAR.
Patient refused CHG bath. RN educated on importance to prevent infection. Patient continues to refuse.
Patient's EF (Ejection Fraction) is less than 40%    Heart Failure Medications:  Diuretics[de-identified] Torsemide    (One of the following REQUIRED for EF </= 40%/SYSTOLIC FAILURE but MAY be used in EF% >40%/DIASTOLIC FAILURE)        ACE[de-identified] None        ARB[de-identified] None         ARNI[de-identified] None    (Beta Blockers)  NON- Evidenced Based Beta Blocker (for EF% >40%/DIASTOLIC FAILURE): Metoprolol TARTrate- Lopressor    Evidenced Based Beta Blocker::(REQUIRED for EF% <40%/SYSTOLIC FAILURE) None  . .................................................................................................................................................. Healthy Weight Tracking - BMI + Meds 6/3/2023 6/4/2023 6/5/2023 6/5/2023 6/5/2023 6/6/2023 6/7/2023   Weight 242 lb 15.2 oz 247 lb 5.7 oz 252 lb 6.8 oz 255 lb 8.2 oz 248 lb 14.4 oz 249 lb 1.9 oz 247 lb 8 oz   Height - - - - - - -   Body Mass Index 35.88 kg/m2 36.53 kg/m2 37.28 kg/m2 37.73 kg/m2 36.76 kg/m2 36.79 kg/m2 36.55 kg/m2   Some recent data might be hidden         Patient's weights and intake/output reviewed: Yes    Daily Weight log at bedside and being used: \"yes    Patient's Last Weight: 112.265 kg obtained by Lift Scale. Difference of 2 lbs less than last documented weight.       Intake/Output Summary (Last 24 hours) at 6/7/2023 1004  Last data filed at 6/7/2023 4306  Gross per 24 hour   Intake 1180 ml   Output 200 ml   Net 980 ml       Education Booklet Provided: yes    Comorbidities Reviewed Yes    Patient has a past medical history of Ambulatory dysfunction, Aortic stenosis, Arthritis, Asthma, Bilateral hilar adenopathy syndrome, CAD (coronary artery disease), Cardiomyopathy ([address] CHF (congestive heart failure) ([address] Chronic pain, COPD (chronic obstructive pulmonary disease) ([address] Depression, Diabetes mellitus ([address] Difficult intravenous access, Emphysema of lung ([address] ESRD (end stage renal disease) on dialysis ([address] Fear of needles, Gastric ulcer, GERD (gastroesophageal reflux
Perfect serve sent to   Dr. Serena Garcia for the following Critical labs.     k 6.4  bun 87  creat 7.2    Patient currently en-route to dialysis. NNO.
Physical Therapy  Facility/Department: [place] [address] [facility]  Daily Treatment Note  NAME: Chuckie Chung  : 1970  MRN: [id number]    Date of Service: 2023    Discharge Recommendations:  IP Rehab   PT Equipment Recommendations  Equipment Needed: No  Other: TBD at rehab facility  Barriers to Home Discharge:   [] Steps to access home entry or bed/bath:   [x] Unable to transfer, ambulate, or propel wheelchair household distances without assist   [] Limited available assist at home upon discharge    [] Patient or family requests d/c to post-acute facility    [x] Poor cognition/safety awareness for d/c to home alone   [x] Unable to maintain ordered weight bearing status    [] Patient with salient signs of long-standing immobility   [x] Other:Decreased ADL status     If pt is unable to be seen after this session, please let this note serve as discharge summary. Please see case management note for discharge disposition. Thank you. Patient Diagnosis(es): The primary encounter diagnosis was Gangrene of right foot ([facility] Diagnoses of Diabetic ulcer of toe of left foot associated with type 2 diabetes mellitus, unspecified ulcer stage ([address] Supplemental oxygen dependent, and Peripheral vascular disease, unspecified ([facility] were also pertinent to this visit. Assessment   Assessment: pt required 2 skilled therapists to progress to higher level of function with maximal safety, PT focus on sit to stand transfer and standing tolerance in preparation for hop-to step trasfer pattern, pt able to sit to stand from chair and bed and to remain 1min standing in RW and demos motivated to sit up in recliner.    pt required Max A x 2 and Mod cues for R LE to avoid WBing.  pt progressing toward meeting Acute PT goals as evidenced by tolerance for multiple transfers, pt will benefit from continued Acute Inpatient Skilled PT to address functional mobility deficits, PT recommends Acute Inpatient Rehab for transfer training to
Pt evening shift assessment complete. VSS and medication given as ordered. Pt assessed for comfort needs, given pain medication per pt request. Pt does not express any additional needs at this time, resting comfortably in bed.
Pt refuses to wear home CPAP tonight.
Received from Sony Robles @[employer]. VSS. Pt resting in bed. Bed alarm on. Bed locked and in lowest position. Call light within reach. Pt denies any other needs at this time. Will continue to monitor.
Report given to EMS. No concerns or questions. Pt d/c'ing to [facility]. Removed IV and stopped bleeding. Catheter intact. Pt tolerated well. No redness noted at site. Reviewed d/c instructions, home meds, and  f/u information utilizing teach-back method. Scripts for [employer] and Roxicodone given to EMS driver with packet. Patient verbalized understanding.  Electronically signed by Ethelene Landau, RN on 6/9/23 at 6:50 PM EDT
Report given to RN at [facility]. Informed patient will come with two scripts- Eliquis and Roxicodone. Call back number given. No questions or concerns at this time. Patient belongins packed and at bedside awaiting for transport.  Electronically signed by Ava Coughlin RN on 6/9/23 at 5:22 PM EDT
Shift assessment completed and charted. VSS. A&OX4. Bed alarm on. Bed locked and in lowest position. Call light within reach. Pt denies any other needs at this time. Will continue to monitor.
[facility] Progress Note           Subjective/   52y.o. year old male who we are seeing in consultation for ESKD on HD. HD on 6/5 w 3.1 l net UF  and 1k bath used for 1h for k of 6.4, postweight 113 kg  Awaiting placement    ROS: No fever or chills. Social: No family at bedside.     Objective/   GEN:  Chronically ill, BP (!) 158/83   Pulse 78   Temp 97.7 °F (36.5 °C) (Oral)   Resp 18   Ht 5' 9\" (1.753 m)   Wt 249 lb 1.9 oz (113 kg)   SpO2 99%   BMI 36.79 kg/m²     HEENT: non-icteric, no JVD  CV: S1, S2 without m/r/g; trace LE edema, lateral part of right foot with dry gangrene, s/p amputation   RESP: CTA B without w/r/r; breathing wnl  ABD: +bs, soft, nt, no hsm  SKIN: warm, no rashes  ACCESS: Left IJ [facility]    Data/  Recent Labs     06/04/23  0644 06/05/23  0734 06/06/23  1044   WBC 7.8 8.4 7.0   HGB 10.2* 9.0* 8.7*   HCT 32.7* 27.4* 27.1*   MCV 95.4 91.3 91.7    352 323       Recent Labs     06/04/23  0644 06/05/23  0734 06/06/23  1043   * 129* 127*   K 6.3* 6.4* 5.0   CL 91* 89* 88*   CO2 21 22 25   GLUCOSE 209* 140* 225*   PHOS 5.5* 6.3* 4.3   MG  --  2.20 2.10   BUN 60* 87* 57*   CREATININE 6.1* 7.2* 5.6*   LABGLOM 10* 8* 11*         Assessment/     - End stage kidney disease - on HD Mon-Wed-Fri     - Right toe with gangrenous ulceration/cellulitis - plans per Podiatry/Vascular surgery, s/p I&D, right foot fifth ray amputation on 5/26     - Anemia of chronic disease - on long acting DAVON with HD     - Hx of TAVR    - Hyperkalemia - c/b bradycardia, better now with CRRT/HD     - Hyponatremia - chronic due to fluid overload, in high 120's, low 130's at its best       Plan/     - HD MWF schedule, next on 6/7   Will need new TW assessment   Discussed with nursing about getting Jeaneth lift weight for 6/7 predialysis  - Started supplemental DAVON dosing X3/week   - Trend labs, bp's, & urine output    ____________________________________  Stephanie Stafford MD  [facility] Progress Note           Subjective/   52y.o. year old male who we are seeing in consultation for ESKD on HD. HD on 6/5 w 3l net UF  and 1k bath used for 1h for k of 6.4  C/o back pain    ROS: No fever or chills. Social: No family at bedside. Objective/   GEN:  Chronically ill, BP (!) 166/79   Pulse 77   Temp 97.4 °F (36.3 °C) (Oral)   Resp 16   Ht 5' 9\" (1.753 m)   Wt 252 lb 6.8 oz (114.5 kg)   SpO2 98%   BMI 37.28 kg/m²     HEENT: non-icteric, no JVD  CV: S1, S2 without m/r/g; trace LE edema, lateral part of right foot with dry gangrene, s/p amputation   RESP: CTA B without w/r/r; breathing wnl  ABD: +bs, soft, nt, no hsm  SKIN: warm, no rashes  ACCESS: Left IJ [facility]    Data/  Recent Labs     06/03/23  0709 06/04/23  0644 06/05/23  0734   WBC 8.6 7.8 8.4   HGB 8.7* 10.2* 9.0*   HCT 26.9* 32.7* 27.4*   MCV 91.4 95.4 91.3    222 352       Recent Labs     06/03/23  0709 06/04/23  0644 06/05/23  0734   * 126* 129*   K 4.6 6.3* 6.4*   CL 91* 91* 89*   CO2 23 21 22   GLUCOSE 155* 209* 140*   PHOS 3.7 5.5* 6.3*   MG  --   --  2.20   BUN 37* 60* 87*   CREATININE 4.5* 6.1* 7.2*   LABGLOM 15* 10* 8*         Assessment/     - End stage kidney disease - on HD Mon-Wed-Fri     - Right toe with gangrenous ulceration/cellulitis - plans per Podiatry/Vascular surgery, s/p I&D, right foot fifth ray amputation on 5/26     - Anemia of chronic disease - on long acting DAVON with HD     - Hx of TAVR    - Hyperkalemia - c/b bradycardia, better now with CRRT/HD     - Hyponatremia - chronic due to fluid overload, in high 120's, low 130's at its best       Plan/     - HD MWF schedule    Will need new TW assessment  - Started supplemental DAVON dosing X3/week   - Trend labs, bp's, & urine output    ____________________________________  Stephanie Stafford MD  [facility]  [email or web address]  Office: [phone]
[facility] Progress Note           Subjective/   52y.o. year old male who we are seeing in consultation for ESKD on HD. HD on 6/7 with 3.5 L net UF, postweight 108.8 kg  HD on 6/5 w 3.1 l net UF  and 1k bath used for 1h for k of 6.4, postweight 113 kg  Awaiting placement    ROS: No fever or chills. Social: No family at bedside. Objective/   GEN:  Chronically ill, /66   Pulse 77   Temp 97.4 °F (36.3 °C)   Resp 18   Ht 5' 9\" (1.753 m)   Wt 239 lb 13.8 oz (108.8 kg)   SpO2 99%   BMI 35.42 kg/m²     HEENT: non-icteric, no JVD  CV: S1, S2 without m/r/g; trace LE edema, lateral part of right foot with dry gangrene, s/p amputation   RESP: CTA B without w/r/r; breathing wnl  ABD: +bs, soft, nt, no hsm  SKIN: warm, no rashes  ACCESS: Left IJ [facility]    Data/  Recent Labs     06/05/23  0734 06/06/23  1044 06/07/23  0700   WBC 8.4 7.0 8.3   HGB 9.0* 8.7* 8.3*   HCT 27.4* 27.1* 24.9*   MCV 91.3 91.7 90.0    323 295       Recent Labs     06/05/23  0734 06/06/23  1043 06/07/23  0700   * 127* 126*   K 6.4* 5.0 5.9*   CL 89* 88* 88*   CO2 22 25 24   GLUCOSE 140* 225* 183*   PHOS 6.3* 4.3 5.5*   MG 2.20 2.10  --    BUN 87* 57* 73*   CREATININE 7.2* 5.6* 7.0*   LABGLOM 8* 11* 9*         Assessment/     - End stage kidney disease - on HD Mon-Wed-Fri     - Right toe with gangrenous ulceration/cellulitis - plans per Podiatry/Vascular surgery, s/p I&D, right foot fifth ray amputation on 5/26     - Anemia of chronic disease - on long acting DAVON with HD     - Hx of TAVR    - Hyperkalemia - c/b bradycardia, better now with CRRT/HD     - Hyponatremia - chronic due to fluid overload, in high 120's, low 130's at its best       Plan/     - HD MWF schedule, next on 6/7   Will need new TW assessment ?   108 kg   Standing weight on 6/7 predialysis was 112.3 kg  - DAVON dosing X3/week   - Trend labs, bp's, & urine output    ____________________________________  Leander Evans MD  [facility] Progress Note           Subjective/   52y.o. year old male who we are seeing in consultation for ESKD on HD. HD on 6/7 with 3.5 L net UF, postweight 108.8 kg  HD on 6/5 w 3.1 l net UF  and 1k bath used for 1h for k of 6.4, postweight 113 kg  Awaiting placement, pre-CERT pending    ROS: No fever or chills. Social: No family at bedside. Objective/   GEN:  Chronically ill, BP (!) 148/71   Pulse 70   Temp 98 °F (36.7 °C) (Oral)   Resp 16   Ht 5' 9\" (1.753 m)   Wt 246 lb 14.6 oz (112 kg)   SpO2 100%   BMI 36.46 kg/m²     HEENT: non-icteric, no JVD  CV: S1, S2 without m/r/g; trace LE edema, lateral part of right foot with dry gangrene, s/p amputation   RESP: CTA B without w/r/r; breathing wnl  ABD: +bs, soft, nt, no hsm  SKIN: warm, no rashes  ACCESS: Left IJ [facility]    Data/  Recent Labs     06/06/23  1044 06/07/23  0700 06/08/23  0730   WBC 7.0 8.3 6.8   HGB 8.7* 8.3* 9.0*   HCT 27.1* 24.9* 27.5*   MCV 91.7 90.0 91.3    295 264       Recent Labs     06/06/23  1043 06/07/23  0700 06/08/23  0730   * 126* 128*   K 5.0 5.9* 4.9   CL 88* 88* 90*   CO2 25 24 24   GLUCOSE 225* 183* 143*   PHOS 4.3 5.5* 4.1   MG 2.10  --  2.00   BUN 57* 73* 49*   CREATININE 5.6* 7.0* 5.1*   LABGLOM 11* 9* 13*         Assessment/     - End stage kidney disease - on HD Mon-Wed-Fri     - Right toe with gangrenous ulceration/cellulitis - plans per Podiatry/Vascular surgery, s/p I&D, right foot fifth ray amputation on 5/26     - Anemia of chronic disease - on long acting DAVON with HD     - Hx of TAVR    - Hyperkalemia - c/b bradycardia, better now with CRRT/HD     - Hyponatremia - chronic due to fluid overload, in high 120's, low 130's at its best       Plan/     - HD MWF schedule   Will need new TW assessment ?   108 kg   Standing weight on 6/7 predialysis was 112.3 kg  - DAVON dosing X3/week   - Trend labs, bp's, & urine output    ____________________________________  Everet Luís,
Treatment time: 3.5    Net UF: 4400    Pre weight: 111.8 kg  Post weight: 107.5 kg  EDW: TBD    Access used: Lt CVC  Access function: good no issues     Medications or blood products given: none    Regular outpatient schedule: MWF    Summary of response to treatment: marcia well no issues     Copy of dialysis treatment record placed in chart, to be scanned into EMR.
(Comment)  Fluid (ml/day): 1500 mL FR    Nutrition Diagnosis:   Inadequate oral intake related to inadequate protein-energy intake as evidenced by wounds, intake 26-50%, intake 51-75%    Nutrition Interventions:   Food and/or Nutrient Delivery: Continue Current Diet, Continue Oral Nutrition Supplement  Nutrition Education/Counseling: No recommendation at this time  Coordination of Nutrition Care: Continue to monitor while inpatient       Goals:  Previous Goal Met: Progressing toward Goal(s)  Goals: PO intake 50% or greater, prior to discharge       Nutrition Monitoring and Evaluation:   Behavioral-Environmental Outcomes: None Identified  Food/Nutrient Intake Outcomes: Food and Nutrient Intake, Supplement Intake  Physical Signs/Symptoms Outcomes: Biochemical Data, Nutrition Focused Physical Findings, Weight, Skin    Discharge Planning:    Continue current diet, Continue Oral Nutrition Supplement     Logan Zavaleta, MS, RD, LD  Contact: [phone]
Trend labs, bp's, & urine output    ____________________________________  Suzan Bartlett MD  [facility]  [email or web address]  Office: [phone]
follow I/O as well as clinical response. HTN/CAD - w/ known CAD but no evidence of active signs/sxs of ischemia/failure. Currently controlled on home meds w/ vitals reviewed and documented as above. S/P LHCath 10 May 2023 w/ PATRICIA x 2 w/out complications     DM2 - diet 'controlled' on home oral antiGlycemics/Insulin - held/continued. Follow FSBS/SSI medium regimen. Last HbA1c 9.3% dated this admission. Anticipate some kind of PO home regimen to start at discharge. S/P TAVR - Continue Plavix/Pravastatin. COPD - w/ chronic respiratory failure on baseline home O2 at 4L. Controlled on home medication regimen - continued. Afib - chronic paroxysmal of unspecified and clinically unable to determine etiology. Normally rate controlled on BBlocker - held w/ episode of symptomatic Bradycardia. Anticoagulated at baseline on home Eliquis due to secondary hypercoaguable state due to Afib - held. Monitored on tele. Symptomatic bradycardia - noted on 5/24/23 just before pt was to begin HD, rapid response called, EKG was concerning for junctional rhythm with PVCs  -stopped bb  -given 1mg atropine, 1mg glucagon  -placed in ICU  -EP and CC consulted, apprec mgmt  -started on dopamine ggt/levo ggt, now weaned off as of 5/25 and out of ICU       Physical Exam Performed:      General appearance:  No apparent distress  Respiratory:  Normal respiratory effort. Cardiovascular:  Regular rate and rhythm. Abdomen:  Soft, non-tender, non-distended. Musculoskelatal:  No edema  Neurologic:  Non-focal  Psychiatric:  Alert and oriented    BP (!) 151/70   Pulse 75   Temp 98.1 °F (36.7 °C) (Oral)   Resp 16   Ht 5' 9\" (1.753 m)   Wt 246 lb 14.6 oz (112 kg)   SpO2 100%   BMI 36.46 kg/m²     Diet: ADULT ORAL NUTRITION SUPPLEMENT; Breakfast, Dinner; Renal Oral Supplement  ADULT DIET; Regular; No Added Salt (3-4 gm); Low Potassium (Less than 3000 mg/day);  Low Phosphorus (Less than 1000 mg); 1500 ml  DVT Prophylaxis:
follow I/O as well as clinical response. HTN/CAD - w/ known CAD but no evidence of active signs/sxs of ischemia/failure. Currently controlled on home meds w/ vitals reviewed and documented as above. S/P LHCath 10 May 2023 w/ PATRICIA x 2 w/out complications     DM2 - diet 'controlled' on home oral antiGlycemics/Insulin - held/continued. Follow FSBS/SSI medium regimen. Last HbA1c 9.3% dated this admission. Anticipate some kind of PO home regimen to start at discharge. S/P TAVR - Continue Plavix/Pravastatin. COPD - w/ chronic respiratory failure on baseline home O2 at 4L. Controlled on home medication regimen - continued. Afib - chronic paroxysmal of unspecified and clinically unable to determine etiology. Normally rate controlled on BBlocker - held w/ episode of symptomatic Bradycardia. Anticoagulated at baseline on home Eliquis due to secondary hypercoaguable state due to Afib - held. Monitored on tele. Symptomatic bradycardia - noted on 5/24/23 just before pt was to begin HD, rapid response called, EKG was concerning for junctional rhythm with PVCs  -stopped bb  -given 1mg atropine, 1mg glucagon  -placed in ICU  -EP and CC consulted, apprec mgmt  -started on dopamine ggt/levo ggt, now weaned off as of 5/25 and out of ICU       Physical Exam Performed:      General appearance:  No apparent distress  Respiratory:  Normal respiratory effort. Cardiovascular:  Regular rate and rhythm. Abdomen:  Soft, non-tender, non-distended. Musculoskelatal:  No edema  Neurologic:  Non-focal  Psychiatric:  Alert and oriented    BP (!) 173/112   Pulse 89   Temp 97.5 °F (36.4 °C) (Oral)   Resp 19   Ht 5' 9\" (1.753 m)   Wt 246 lb 7.6 oz (111.8 kg)   SpO2 98%   BMI 36.40 kg/m²     Diet: ADULT ORAL NUTRITION SUPPLEMENT; Breakfast, Dinner; Renal Oral Supplement  ADULT DIET; Regular; No Added Salt (3-4 gm); Low Potassium (Less than 3000 mg/day);  Low Phosphorus (Less than 1000 mg); 1500 ml  DVT Prophylaxis:
Transfers: Assist X2;Maximum assistance  Bed to Chair: Assist X2;Maximum assistance (rw)  Duration: 1 (1 min standing rw)  Gait Training  Gait Training: No     ADL  Feeding: Independent  Grooming: Setup  Grooming Skilled Clinical Factors: Pt wiped face, neck and chest with warm cloth. OT Exercises  A/AROM Exercises: x10 BUE horizontal abduction     Safety Devices  Type of Devices: Call light within reach; Patient at risk for falls; Heels elevated for pressure relief;Nurse notified;Gait belt; All fall risk precautions in place; Left in chair;Chair alarm in place  Restraints  Restraints Initially in Place: No     Patient Education  Education Given To: Patient  Education Provided: Role of Therapy;Plan of Care;Home Exercise Program;Precautions; ADL Adaptive Strategies;Transfer Training;Energy Conservation  Education Provided Comments: role of OT, importance of OOB mobility  Education Method: Demonstration;Verbal  Barriers to Learning: Cognition  Education Outcome: Verbalized understanding;Continued education needed;Demonstrated understanding    Goals  Short Term Goals  Time Frame for Short Term Goals: 1wk (6/06/23)-- unless noted elsewhere, goals ongoing 6/01  Short Term Goal 1: Pt will complete LE dressing with SPV  Short Term Goal 2: Pt will complete functional SPT with Mod A x1 and maintenance of NWB status  Short Term Goal 3: Pt will complete 1-2 grooming tasks at sink with Min A by 6/04  Patient Goals   Patient goals : \"to go home\"       Therapy Time   Individual Concurrent Group Co-treatment   Time In 0828         Time Out 0908         Minutes 40         Timed Code Treatment Minutes: 79 Bandar Draper OT
[]PPx LMWH  []SQ Heparin  []IPC/SCDs  [x]Eliquis  []Xarelto  []Coumadin  []Other -      Code status: Full Code  PT/OT Eval Status:   []NOT yet ordered  []Ordered and Pending   [x]Seen with Recommendations for:  []Home independently  []Home w/ assist  [facility] [place] [facility]  [x]SNF  [x]Acute Rehab    Anticipated Discharge Day/Date:  Medically stable for discharge since Sat 3 Colleen pending clinical course, subspecialty recs and placement decision. IV Narcotic analgesia will be a barrier to discharge      Anticipated Discharge Location: []Home  []HHC  []SNF  [x]Acute Rehab - [facility]  []ECF  []LTAC  []Hospice  []Other -      Consults:      PHARMACY CONSULT FOR RENAL DOSING  IP CONSULT TO HOSPITALIST  IP CONSULT TO NEPHROLOGY  IP CONSULT TO PODIATRY  IP CONSULT TO VASCULAR SURGERY  IP CONSULT TO PHARMACY  IP CONSULT TO CARDIOLOGY  IP CONSULT TO CRITICAL CARE  IP CONSULT TO PHYSICAL MEDICINE REHAB      This patient has a high likelihood of being discharged tomorrow and is appropriate for A1 Discharge Unit in AM pending clinical course overnight: [x]Yes  []No    ------------------------------------------------------------------------------------------------------------------------------------------------------------------------    MDM      [x] High (any 2)    A. Problems (any 1)  [x] Acute/Chronic Illness/injury posing threat to life or bodily function: CHF/DM and ESRD on HD.   [] Severe exacerbation of chronic illness:    ---------------------------------------------------------------------  B. Risk of Treatment (any 1)   [x] Drugs/treatments that require intensive monitoring for toxicity include:  HD  [] Change in code status:    [] Decision to escalate care:    [] Major surgery/procedure with associated risk factors:    ----------------------------------------------------------------------  C. Data (any 2)  [] Discussed management of the case with:  Nephrology 26/27 May and 2 June. Podiatry 28 May.   Nephrology 3 Colleen  [] Imaging personally
In 1028         Time Out 1107         Minutes 39         Timed Code Treatment Minutes: [address], [place], DPT    If pt is unable to be seen after this session, please let this note serve as discharge summary. Please see case management note for discharge disposition. Thank you.

## 2023-06-09 NOTE — RT PROTOCOL NOTE
breathing using Per Protocol order mode. 4-6 - enter or revise RT Bronchodilator order(s) to two equivalent RT bronchodilator orders with one order with BID Frequency and one order with Frequency of every 4 hours PRN wheezing or increased work of breathing using Per Protocol order mode. 7-10 - enter or revise RT Bronchodilator order(s) to two equivalent RT bronchodilator orders with one order with TID Frequency and one order with Frequency of every 4 hours PRN wheezing or increased work of breathing using Per Protocol order mode. 11-13 - enter or revise RT Bronchodilator order(s) to one equivalent RT bronchodilator order with QID Frequency and an Albuterol order with Frequency of every 4 hours PRN wheezing or increased work of breathing using Per Protocol order mode. Greater than 13 - enter or revise RT Bronchodilator order(s) to one equivalent RT bronchodilator order with every 4 hours Frequency and an Albuterol order with Frequency of every 2 hours PRN wheezing or increased work of breathing using Per Protocol order mode. RT to enter RT Home Evaluation for COPD & MDI Assessment order using Per Protocol order mode.     Electronically signed by Emi Hart RCP on 6/9/2023 at 6:16 AM

## 2023-06-09 NOTE — CARE COORDINATION
CASE MANAGEMENT DISCHARGE SUMMARY      Discharge to: [facility]    Precertification completed: yes via [facility] Notification ([facility]) completed: Document ID : [id number]    IMM given: 6/9/23    New Durable Medical Equipment ordered/agency: NA    Transportation:       Medical Transport explained to pt/family. Pt/family voice no agency preference. Agency used: [facility]   time:1800   Ambulance form completed: Yes    Confirmed discharge plan with:     Patient: yes     Family:  yes, per patient     Facility/Agency, name:  [facility] to obtain from [facility] number for report to facility: [phone]     RN, name: Moiz Schumacher    Note: Discharging nurse to complete [facility], reconcile AVS, and place final copy with patient's discharge packet. RN to ensure that written prescriptions for  Level II medications are sent with patient to the facility as per protocol.

## 2023-06-09 NOTE — CARE COORDINATION
[id number]  [id number]  Await Hep B lab. Electronically signed by NINO Rutledge on 6/9/2023 at 7:21 AM     Per lab Hep B core could take several days for results. RN and patient updated. Facility updated.   Electronically signed by NINO Rutledge on 6/9/2023 at 9:36 AM

## 2023-06-19 LAB
FUNGUS SPEC CULT: NORMAL
LOEFFLER MB STN SPEC: NORMAL

## 2023-06-20 LAB
ACID FAST STN SPEC QL: NORMAL
MYCOBACTERIUM SPEC CULT: NORMAL

## 2023-06-26 ENCOUNTER — HOSPITAL ENCOUNTER (EMERGENCY)
Age: 53
Discharge: HOME | End: 2023-06-26
Attending: EMERGENCY MEDICINE
Payer: MEDICARE

## 2023-06-26 ENCOUNTER — APPOINTMENT (OUTPATIENT)
Dept: CT IMAGING | Age: 53
End: 2023-06-26
Payer: MEDICARE

## 2023-06-26 ENCOUNTER — APPOINTMENT (OUTPATIENT)
Dept: GENERAL RADIOLOGY | Age: 53
End: 2023-06-26
Payer: MEDICARE

## 2023-06-26 VITALS
HEART RATE: 62 BPM | BODY MASS INDEX: 33.77 KG/M2 | SYSTOLIC BLOOD PRESSURE: 130 MMHG | HEIGHT: 69 IN | DIASTOLIC BLOOD PRESSURE: 62 MMHG | RESPIRATION RATE: 16 BRPM | OXYGEN SATURATION: 100 % | TEMPERATURE: 98 F | WEIGHT: 228 LBS

## 2023-06-26 DIAGNOSIS — R11.2 NAUSEA AND VOMITING, UNSPECIFIED VOMITING TYPE: Primary | ICD-10-CM

## 2023-06-26 LAB
ALBUMIN SERPL-MCNC: 4.2 G/DL (ref 3.4–5)
ALBUMIN/GLOB SERPL: 1.1 {RATIO} (ref 1.1–2.2)
ALP SERPL-CCNC: 92 U/L (ref 40–129)
ALT SERPL-CCNC: 10 U/L (ref 10–40)
ANION GAP SERPL CALCULATED.3IONS-SCNC: 21 MMOL/L (ref 3–16)
AST SERPL-CCNC: 12 U/L (ref 15–37)
BASOPHILS # BLD: 0 K/UL (ref 0–0.2)
BASOPHILS NFR BLD: 0.3 %
BILIRUB SERPL-MCNC: 0.3 MG/DL (ref 0–1)
BUN SERPL-MCNC: 51 MG/DL (ref 7–20)
CALCIUM SERPL-MCNC: 10.2 MG/DL (ref 8.3–10.6)
CHLORIDE SERPL-SCNC: 92 MMOL/L (ref 99–110)
CO2 SERPL-SCNC: 21 MMOL/L (ref 21–32)
CREAT SERPL-MCNC: 8.8 MG/DL (ref 0.9–1.3)
DEPRECATED RDW RBC AUTO: 17.4 % (ref 12.4–15.4)
EKG ATRIAL RATE: 73 BPM
EKG DIAGNOSIS: NORMAL
EKG P AXIS: 89 DEGREES
EKG P-R INTERVAL: 180 MS
EKG Q-T INTERVAL: 416 MS
EKG QRS DURATION: 100 MS
EKG QTC CALCULATION (BAZETT): 458 MS
EKG R AXIS: 25 DEGREES
EKG T AXIS: 8 DEGREES
EKG VENTRICULAR RATE: 73 BPM
EOSINOPHIL # BLD: 0.2 K/UL (ref 0–0.6)
EOSINOPHIL NFR BLD: 1.8 %
FUNGUS SPEC CULT: NORMAL
GFR SERPLBLD CREATININE-BSD FMLA CKD-EPI: 7 ML/MIN/{1.73_M2}
GLUCOSE SERPL-MCNC: 110 MG/DL (ref 70–99)
HCT VFR BLD AUTO: 30.2 % (ref 40.5–52.5)
HGB BLD-MCNC: 9.8 G/DL (ref 13.5–17.5)
LACTATE BLDV-SCNC: 3.7 MMOL/L (ref 0.4–1.9)
LACTATE BLDV-SCNC: 3.8 MMOL/L (ref 0.4–1.9)
LOEFFLER MB STN SPEC: NORMAL
LYMPHOCYTES # BLD: 0.6 K/UL (ref 1–5.1)
LYMPHOCYTES NFR BLD: 6 %
MCH RBC QN AUTO: 29.5 PG (ref 26–34)
MCHC RBC AUTO-ENTMCNC: 32.4 G/DL (ref 31–36)
MCV RBC AUTO: 91.1 FL (ref 80–100)
MONOCYTES # BLD: 0.5 K/UL (ref 0–1.3)
MONOCYTES NFR BLD: 5.1 %
NEUTROPHILS # BLD: 9.2 K/UL (ref 1.7–7.7)
NEUTROPHILS NFR BLD: 86.8 %
PLATELET # BLD AUTO: 340 K/UL (ref 135–450)
PMV BLD AUTO: 7.4 FL (ref 5–10.5)
POTASSIUM SERPL-SCNC: 5.3 MMOL/L (ref 3.5–5.1)
PROCALCITONIN SERPL IA-MCNC: 0.4 NG/ML (ref 0–0.15)
PROT SERPL-MCNC: 8 G/DL (ref 6.4–8.2)
RBC # BLD AUTO: 3.31 M/UL (ref 4.2–5.9)
SODIUM SERPL-SCNC: 134 MMOL/L (ref 136–145)
TROPONIN, HIGH SENSITIVITY: 131 NG/L (ref 0–22)
TROPONIN, HIGH SENSITIVITY: 136 NG/L (ref 0–22)
WBC # BLD AUTO: 10.6 K/UL (ref 4–11)

## 2023-06-26 PROCEDURE — 74176 CT ABD & PELVIS W/O CONTRAST: CPT

## 2023-06-26 PROCEDURE — 2580000003 HC RX 258: Performed by: NURSE PRACTITIONER

## 2023-06-26 PROCEDURE — 6370000000 HC RX 637 (ALT 250 FOR IP): Performed by: NURSE PRACTITIONER

## 2023-06-26 PROCEDURE — 84484 ASSAY OF TROPONIN QUANT: CPT

## 2023-06-26 PROCEDURE — 93005 ELECTROCARDIOGRAM TRACING: CPT | Performed by: NURSE PRACTITIONER

## 2023-06-26 PROCEDURE — 87040 BLOOD CULTURE FOR BACTERIA: CPT

## 2023-06-26 PROCEDURE — 84145 PROCALCITONIN (PCT): CPT

## 2023-06-26 PROCEDURE — 96374 THER/PROPH/DIAG INJ IV PUSH: CPT

## 2023-06-26 PROCEDURE — 93010 ELECTROCARDIOGRAM REPORT: CPT | Performed by: INTERNAL MEDICINE

## 2023-06-26 PROCEDURE — 96375 TX/PRO/DX INJ NEW DRUG ADDON: CPT

## 2023-06-26 PROCEDURE — 85025 COMPLETE CBC W/AUTO DIFF WBC: CPT

## 2023-06-26 PROCEDURE — 71045 X-RAY EXAM CHEST 1 VIEW: CPT

## 2023-06-26 PROCEDURE — 96376 TX/PRO/DX INJ SAME DRUG ADON: CPT

## 2023-06-26 PROCEDURE — 80053 COMPREHEN METABOLIC PANEL: CPT

## 2023-06-26 PROCEDURE — 83605 ASSAY OF LACTIC ACID: CPT

## 2023-06-26 PROCEDURE — 6360000002 HC RX W HCPCS: Performed by: NURSE PRACTITIONER

## 2023-06-26 PROCEDURE — 99285 EMERGENCY DEPT VISIT HI MDM: CPT

## 2023-06-26 RX ORDER — METOCLOPRAMIDE HYDROCHLORIDE 5 MG/ML
10 INJECTION INTRAMUSCULAR; INTRAVENOUS ONCE
Status: COMPLETED | OUTPATIENT
Start: 2023-06-26 | End: 2023-06-26

## 2023-06-26 RX ORDER — DIPHENHYDRAMINE HYDROCHLORIDE 50 MG/ML
25 INJECTION INTRAMUSCULAR; INTRAVENOUS ONCE
Status: COMPLETED | OUTPATIENT
Start: 2023-06-26 | End: 2023-06-26

## 2023-06-26 RX ORDER — OXYCODONE HYDROCHLORIDE 5 MG/1
5 TABLET ORAL ONCE
Status: COMPLETED | OUTPATIENT
Start: 2023-06-26 | End: 2023-06-26

## 2023-06-26 RX ORDER — ONDANSETRON 2 MG/ML
4 INJECTION INTRAMUSCULAR; INTRAVENOUS ONCE
Status: COMPLETED | OUTPATIENT
Start: 2023-06-26 | End: 2023-06-26

## 2023-06-26 RX ORDER — 0.9 % SODIUM CHLORIDE 0.9 %
250 INTRAVENOUS SOLUTION INTRAVENOUS ONCE
Status: COMPLETED | OUTPATIENT
Start: 2023-06-26 | End: 2023-06-26

## 2023-06-26 RX ADMIN — DIPHENHYDRAMINE HYDROCHLORIDE 25 MG: 50 INJECTION, SOLUTION INTRAMUSCULAR; INTRAVENOUS at 13:42

## 2023-06-26 RX ADMIN — OXYCODONE HYDROCHLORIDE 5 MG: 5 TABLET ORAL at 12:36

## 2023-06-26 RX ADMIN — METOCLOPRAMIDE HYDROCHLORIDE 10 MG: 5 INJECTION INTRAMUSCULAR; INTRAVENOUS at 13:42

## 2023-06-26 RX ADMIN — ONDANSETRON 4 MG: 2 INJECTION INTRAMUSCULAR; INTRAVENOUS at 12:00

## 2023-06-26 RX ADMIN — ONDANSETRON 4 MG: 2 INJECTION INTRAMUSCULAR; INTRAVENOUS at 11:03

## 2023-06-26 RX ADMIN — SODIUM CHLORIDE 250 ML: 9 INJECTION, SOLUTION INTRAVENOUS at 13:42

## 2023-06-26 RX ADMIN — SODIUM ZIRCONIUM CYCLOSILICATE 10 G: 10 POWDER, FOR SUSPENSION ORAL at 15:57

## 2023-06-26 ASSESSMENT — PAIN - FUNCTIONAL ASSESSMENT: PAIN_FUNCTIONAL_ASSESSMENT: 0-10

## 2023-06-26 ASSESSMENT — PAIN DESCRIPTION - LOCATION
LOCATION: FOOT
LOCATION: BACK

## 2023-06-26 ASSESSMENT — PAIN SCALES - GENERAL
PAINLEVEL_OUTOF10: 8
PAINLEVEL_OUTOF10: 6
PAINLEVEL_OUTOF10: 9

## 2023-06-27 LAB
ACID FAST STN SPEC QL: NORMAL
MYCOBACTERIUM SPEC CULT: NORMAL

## 2023-06-30 LAB
BACTERIA BLD CULT ORG #2: NORMAL
BACTERIA BLD CULT: NORMAL

## 2023-07-04 LAB
ACID FAST STN SPEC QL: NORMAL
MYCOBACTERIUM SPEC CULT: NORMAL

## 2023-07-05 ENCOUNTER — HOSPITAL ENCOUNTER (INPATIENT)
Age: 53
LOS: 2 days | Discharge: NURSING, REHAB OR LONG-TERM CARE | End: 2023-07-07
Attending: EMERGENCY MEDICINE | Admitting: INTERNAL MEDICINE
Payer: MEDICARE

## 2023-07-05 ENCOUNTER — APPOINTMENT (OUTPATIENT)
Dept: GENERAL RADIOLOGY | Age: 53
End: 2023-07-05
Payer: MEDICARE

## 2023-07-05 DIAGNOSIS — R26.2 UNABLE TO AMBULATE: ICD-10-CM

## 2023-07-05 DIAGNOSIS — N18.6 ESRD (END STAGE RENAL DISEASE) (HCC): ICD-10-CM

## 2023-07-05 DIAGNOSIS — T14.8XXA WOUND INFECTION: ICD-10-CM

## 2023-07-05 DIAGNOSIS — L08.9 WOUND INFECTION: ICD-10-CM

## 2023-07-05 DIAGNOSIS — R20.0 NUMBNESS AND TINGLING OF BOTH FEET: Primary | ICD-10-CM

## 2023-07-05 DIAGNOSIS — R20.2 NUMBNESS AND TINGLING OF BOTH FEET: Primary | ICD-10-CM

## 2023-07-05 LAB
ALBUMIN SERPL-MCNC: 3.6 G/DL (ref 3.4–5)
ALBUMIN/GLOB SERPL: 1.3 {RATIO} (ref 1.1–2.2)
ALP SERPL-CCNC: 90 U/L (ref 40–129)
ALT SERPL-CCNC: 10 U/L (ref 10–40)
ANION GAP SERPL CALCULATED.3IONS-SCNC: 23 MMOL/L (ref 3–16)
AST SERPL-CCNC: 12 U/L (ref 15–37)
BASOPHILS # BLD: 0 K/UL (ref 0–0.2)
BASOPHILS NFR BLD: 0.2 %
BILIRUB SERPL-MCNC: 0.3 MG/DL (ref 0–1)
BUN SERPL-MCNC: 61 MG/DL (ref 7–20)
CALCIUM SERPL-MCNC: 8.6 MG/DL (ref 8.3–10.6)
CHLORIDE SERPL-SCNC: 89 MMOL/L (ref 99–110)
CO2 SERPL-SCNC: 18 MMOL/L (ref 21–32)
CREAT SERPL-MCNC: 8.1 MG/DL (ref 0.9–1.3)
DEPRECATED RDW RBC AUTO: 16.9 % (ref 12.4–15.4)
EOSINOPHIL # BLD: 0.1 K/UL (ref 0–0.6)
EOSINOPHIL NFR BLD: 1.3 %
ERYTHROCYTE [SEDIMENTATION RATE] IN BLOOD BY WESTERGREN METHOD: 15 MM/HR (ref 0–20)
GFR SERPLBLD CREATININE-BSD FMLA CKD-EPI: 7 ML/MIN/{1.73_M2}
GLUCOSE BLD-MCNC: 131 MG/DL (ref 70–99)
GLUCOSE BLD-MCNC: 97 MG/DL (ref 70–99)
GLUCOSE SERPL-MCNC: 129 MG/DL (ref 70–99)
HCT VFR BLD AUTO: 29.3 % (ref 40.5–52.5)
HGB BLD-MCNC: 9.6 G/DL (ref 13.5–17.5)
LACTATE BLDV-SCNC: 4.7 MMOL/L (ref 0.4–2)
LYMPHOCYTES # BLD: 0.8 K/UL (ref 1–5.1)
LYMPHOCYTES NFR BLD: 7.6 %
MCH RBC QN AUTO: 29.3 PG (ref 26–34)
MCHC RBC AUTO-ENTMCNC: 32.6 G/DL (ref 31–36)
MCV RBC AUTO: 89.8 FL (ref 80–100)
MONOCYTES # BLD: 0.7 K/UL (ref 0–1.3)
MONOCYTES NFR BLD: 6.3 %
NEUTROPHILS # BLD: 9.2 K/UL (ref 1.7–7.7)
NEUTROPHILS NFR BLD: 84.6 %
PERFORMED ON: ABNORMAL
PERFORMED ON: NORMAL
PLATELET # BLD AUTO: 179 K/UL (ref 135–450)
PMV BLD AUTO: 7.7 FL (ref 5–10.5)
POTASSIUM SERPL-SCNC: 3.2 MMOL/L (ref 3.5–5.1)
PROCALCITONIN SERPL IA-MCNC: 0.48 NG/ML (ref 0–0.15)
PROT SERPL-MCNC: 6.4 G/DL (ref 6.4–8.2)
RBC # BLD AUTO: 3.26 M/UL (ref 4.2–5.9)
SODIUM SERPL-SCNC: 130 MMOL/L (ref 136–145)
SPECIMEN STATUS: NORMAL
TROPONIN, HIGH SENSITIVITY: 106 NG/L (ref 0–22)
WBC # BLD AUTO: 10.9 K/UL (ref 4–11)

## 2023-07-05 PROCEDURE — 6370000000 HC RX 637 (ALT 250 FOR IP): Performed by: EMERGENCY MEDICINE

## 2023-07-05 PROCEDURE — 85025 COMPLETE CBC W/AUTO DIFF WBC: CPT

## 2023-07-05 PROCEDURE — 99285 EMERGENCY DEPT VISIT HI MDM: CPT

## 2023-07-05 PROCEDURE — 90935 HEMODIALYSIS ONE EVALUATION: CPT

## 2023-07-05 PROCEDURE — 1200000000 HC SEMI PRIVATE

## 2023-07-05 PROCEDURE — 2700000000 HC OXYGEN THERAPY PER DAY

## 2023-07-05 PROCEDURE — 85652 RBC SED RATE AUTOMATED: CPT

## 2023-07-05 PROCEDURE — 97166 OT EVAL MOD COMPLEX 45 MIN: CPT

## 2023-07-05 PROCEDURE — 97162 PT EVAL MOD COMPLEX 30 MIN: CPT

## 2023-07-05 PROCEDURE — 6370000000 HC RX 637 (ALT 250 FOR IP): Performed by: INTERNAL MEDICINE

## 2023-07-05 PROCEDURE — 84484 ASSAY OF TROPONIN QUANT: CPT

## 2023-07-05 PROCEDURE — 6360000002 HC RX W HCPCS: Performed by: INTERNAL MEDICINE

## 2023-07-05 PROCEDURE — 2580000003 HC RX 258: Performed by: INTERNAL MEDICINE

## 2023-07-05 PROCEDURE — 83605 ASSAY OF LACTIC ACID: CPT

## 2023-07-05 PROCEDURE — 5A1D70Z PERFORMANCE OF URINARY FILTRATION, INTERMITTENT, LESS THAN 6 HOURS PER DAY: ICD-10-PCS | Performed by: INTERNAL MEDICINE

## 2023-07-05 PROCEDURE — 84145 PROCALCITONIN (PCT): CPT

## 2023-07-05 PROCEDURE — 73630 X-RAY EXAM OF FOOT: CPT

## 2023-07-05 PROCEDURE — 94761 N-INVAS EAR/PLS OXIMETRY MLT: CPT

## 2023-07-05 PROCEDURE — 2500000003 HC RX 250 WO HCPCS: Performed by: INTERNAL MEDICINE

## 2023-07-05 PROCEDURE — 80053 COMPREHEN METABOLIC PANEL: CPT

## 2023-07-05 RX ORDER — GABAPENTIN 100 MG/1
100 CAPSULE ORAL 3 TIMES DAILY
Status: DISCONTINUED | OUTPATIENT
Start: 2023-07-05 | End: 2023-07-07 | Stop reason: HOSPADM

## 2023-07-05 RX ORDER — DEXTROSE MONOHYDRATE 100 MG/ML
INJECTION, SOLUTION INTRAVENOUS CONTINUOUS PRN
Status: DISCONTINUED | OUTPATIENT
Start: 2023-07-05 | End: 2023-07-07 | Stop reason: HOSPADM

## 2023-07-05 RX ORDER — TORSEMIDE 100 MG/1
100 TABLET ORAL DAILY
Status: DISCONTINUED | OUTPATIENT
Start: 2023-07-06 | End: 2023-07-07 | Stop reason: HOSPADM

## 2023-07-05 RX ORDER — PANTOPRAZOLE SODIUM 40 MG/1
40 TABLET, DELAYED RELEASE ORAL
Status: DISCONTINUED | OUTPATIENT
Start: 2023-07-06 | End: 2023-07-07 | Stop reason: HOSPADM

## 2023-07-05 RX ORDER — INSULIN LISPRO 100 [IU]/ML
0-8 INJECTION, SOLUTION INTRAVENOUS; SUBCUTANEOUS
Status: DISCONTINUED | OUTPATIENT
Start: 2023-07-05 | End: 2023-07-07 | Stop reason: HOSPADM

## 2023-07-05 RX ORDER — ACETAMINOPHEN 650 MG/1
650 SUPPOSITORY RECTAL EVERY 6 HOURS PRN
Status: DISCONTINUED | OUTPATIENT
Start: 2023-07-05 | End: 2023-07-07 | Stop reason: HOSPADM

## 2023-07-05 RX ORDER — ACETAMINOPHEN 325 MG/1
650 TABLET ORAL EVERY 6 HOURS PRN
Status: DISCONTINUED | OUTPATIENT
Start: 2023-07-05 | End: 2023-07-07 | Stop reason: HOSPADM

## 2023-07-05 RX ORDER — ISOSORBIDE DINITRATE 20 MG/1
20 TABLET ORAL 3 TIMES DAILY
Status: DISCONTINUED | OUTPATIENT
Start: 2023-07-05 | End: 2023-07-07 | Stop reason: HOSPADM

## 2023-07-05 RX ORDER — QUETIAPINE FUMARATE 25 MG/1
50 TABLET, FILM COATED ORAL NIGHTLY
Status: DISCONTINUED | OUTPATIENT
Start: 2023-07-05 | End: 2023-07-07 | Stop reason: HOSPADM

## 2023-07-05 RX ORDER — CALCIUM ACETATE 667 MG/1
1 CAPSULE ORAL
Status: DISCONTINUED | OUTPATIENT
Start: 2023-07-05 | End: 2023-07-07 | Stop reason: HOSPADM

## 2023-07-05 RX ORDER — SODIUM CHLORIDE 0.9 % (FLUSH) 0.9 %
5-40 SYRINGE (ML) INJECTION PRN
Status: DISCONTINUED | OUTPATIENT
Start: 2023-07-05 | End: 2023-07-07 | Stop reason: HOSPADM

## 2023-07-05 RX ORDER — OXYCODONE HYDROCHLORIDE 5 MG/1
10 TABLET ORAL EVERY 4 HOURS PRN
Status: DISCONTINUED | OUTPATIENT
Start: 2023-07-05 | End: 2023-07-07 | Stop reason: HOSPADM

## 2023-07-05 RX ORDER — HEPARIN SODIUM 1000 [USP'U]/ML
INJECTION, SOLUTION INTRAVENOUS; SUBCUTANEOUS
Status: DISPENSED
Start: 2023-07-05 | End: 2023-07-06

## 2023-07-05 RX ORDER — INSULIN LISPRO 100 [IU]/ML
0-4 INJECTION, SOLUTION INTRAVENOUS; SUBCUTANEOUS NIGHTLY
Status: DISCONTINUED | OUTPATIENT
Start: 2023-07-05 | End: 2023-07-07 | Stop reason: HOSPADM

## 2023-07-05 RX ORDER — GABAPENTIN 100 MG/1
100 CAPSULE ORAL EVERY 8 HOURS SCHEDULED
Status: DISCONTINUED | OUTPATIENT
Start: 2023-07-05 | End: 2023-07-05 | Stop reason: SDUPTHER

## 2023-07-05 RX ORDER — ISOSORBIDE DINITRATE 20 MG/1
20 TABLET ORAL ONCE
Status: COMPLETED | OUTPATIENT
Start: 2023-07-05 | End: 2023-07-05

## 2023-07-05 RX ORDER — METOPROLOL SUCCINATE 50 MG/1
100 TABLET, EXTENDED RELEASE ORAL 2 TIMES DAILY
Status: DISCONTINUED | OUTPATIENT
Start: 2023-07-05 | End: 2023-07-07 | Stop reason: HOSPADM

## 2023-07-05 RX ORDER — NITROGLYCERIN 0.4 MG/1
0.4 TABLET SUBLINGUAL EVERY 5 MIN PRN
Status: DISCONTINUED | OUTPATIENT
Start: 2023-07-05 | End: 2023-07-07 | Stop reason: HOSPADM

## 2023-07-05 RX ORDER — SODIUM CHLORIDE 9 MG/ML
INJECTION, SOLUTION INTRAVENOUS PRN
Status: DISCONTINUED | OUTPATIENT
Start: 2023-07-05 | End: 2023-07-07 | Stop reason: HOSPADM

## 2023-07-05 RX ORDER — TORSEMIDE 100 MG/1
100 TABLET ORAL ONCE
Status: COMPLETED | OUTPATIENT
Start: 2023-07-05 | End: 2023-07-05

## 2023-07-05 RX ORDER — OXYCODONE HYDROCHLORIDE AND ACETAMINOPHEN 5; 325 MG/1; MG/1
1 TABLET ORAL ONCE
Status: COMPLETED | OUTPATIENT
Start: 2023-07-05 | End: 2023-07-05

## 2023-07-05 RX ORDER — ONDANSETRON 2 MG/ML
4 INJECTION INTRAMUSCULAR; INTRAVENOUS EVERY 6 HOURS PRN
Status: DISCONTINUED | OUTPATIENT
Start: 2023-07-05 | End: 2023-07-07 | Stop reason: HOSPADM

## 2023-07-05 RX ORDER — PRAVASTATIN SODIUM 40 MG
40 TABLET ORAL DAILY
Status: DISCONTINUED | OUTPATIENT
Start: 2023-07-05 | End: 2023-07-07 | Stop reason: HOSPADM

## 2023-07-05 RX ORDER — METOPROLOL SUCCINATE 50 MG/1
100 TABLET, EXTENDED RELEASE ORAL ONCE
Status: COMPLETED | OUTPATIENT
Start: 2023-07-05 | End: 2023-07-05

## 2023-07-05 RX ORDER — NEPHROCAP 1 MG
1 CAP ORAL DAILY
Status: DISCONTINUED | OUTPATIENT
Start: 2023-07-05 | End: 2023-07-07 | Stop reason: HOSPADM

## 2023-07-05 RX ORDER — CLOPIDOGREL BISULFATE 75 MG/1
75 TABLET ORAL DAILY
Status: DISCONTINUED | OUTPATIENT
Start: 2023-07-05 | End: 2023-07-07 | Stop reason: HOSPADM

## 2023-07-05 RX ORDER — DULOXETIN HYDROCHLORIDE 60 MG/1
60 CAPSULE, DELAYED RELEASE ORAL DAILY
Status: DISCONTINUED | OUTPATIENT
Start: 2023-07-05 | End: 2023-07-07 | Stop reason: HOSPADM

## 2023-07-05 RX ORDER — SODIUM CHLORIDE 0.9 % (FLUSH) 0.9 %
5-40 SYRINGE (ML) INJECTION EVERY 12 HOURS SCHEDULED
Status: DISCONTINUED | OUTPATIENT
Start: 2023-07-05 | End: 2023-07-07 | Stop reason: HOSPADM

## 2023-07-05 RX ORDER — HEPARIN SODIUM 1000 [USP'U]/ML
3600 INJECTION, SOLUTION INTRAVENOUS; SUBCUTANEOUS PRN
Status: DISCONTINUED | OUTPATIENT
Start: 2023-07-05 | End: 2023-07-07 | Stop reason: HOSPADM

## 2023-07-05 RX ORDER — POLYETHYLENE GLYCOL 3350 17 G/17G
17 POWDER, FOR SOLUTION ORAL DAILY PRN
Status: DISCONTINUED | OUTPATIENT
Start: 2023-07-05 | End: 2023-07-07 | Stop reason: HOSPADM

## 2023-07-05 RX ORDER — IPRATROPIUM BROMIDE AND ALBUTEROL SULFATE 2.5; .5 MG/3ML; MG/3ML
1 SOLUTION RESPIRATORY (INHALATION) EVERY 4 HOURS PRN
Status: DISCONTINUED | OUTPATIENT
Start: 2023-07-05 | End: 2023-07-07 | Stop reason: HOSPADM

## 2023-07-05 RX ORDER — TRAZODONE HYDROCHLORIDE 50 MG/1
50 TABLET ORAL DAILY
Status: DISCONTINUED | OUTPATIENT
Start: 2023-07-05 | End: 2023-07-07 | Stop reason: HOSPADM

## 2023-07-05 RX ORDER — OXYCODONE HYDROCHLORIDE 5 MG/1
5 TABLET ORAL EVERY 4 HOURS PRN
Status: DISCONTINUED | OUTPATIENT
Start: 2023-07-05 | End: 2023-07-07 | Stop reason: HOSPADM

## 2023-07-05 RX ORDER — ONDANSETRON 4 MG/1
4 TABLET, ORALLY DISINTEGRATING ORAL EVERY 8 HOURS PRN
Status: DISCONTINUED | OUTPATIENT
Start: 2023-07-05 | End: 2023-07-07 | Stop reason: HOSPADM

## 2023-07-05 RX ADMIN — OXYCODONE AND ACETAMINOPHEN 1 TABLET: 5; 325 TABLET ORAL at 11:42

## 2023-07-05 RX ADMIN — CALCIUM ACETATE 667 MG: 667 CAPSULE ORAL at 18:43

## 2023-07-05 RX ADMIN — GABAPENTIN 100 MG: 100 CAPSULE ORAL at 21:00

## 2023-07-05 RX ADMIN — METOPROLOL SUCCINATE 100 MG: 50 TABLET, EXTENDED RELEASE ORAL at 21:00

## 2023-07-05 RX ADMIN — PRAVASTATIN SODIUM 40 MG: 40 TABLET ORAL at 18:43

## 2023-07-05 RX ADMIN — DULOXETINE 60 MG: 60 CAPSULE, DELAYED RELEASE ORAL at 18:43

## 2023-07-05 RX ADMIN — QUETIAPINE FUMARATE 50 MG: 25 TABLET ORAL at 21:00

## 2023-07-05 RX ADMIN — OXYCODONE 5 MG: 5 TABLET ORAL at 18:43

## 2023-07-05 RX ADMIN — ISOSORBIDE DINITRATE 20 MG: 20 TABLET ORAL at 21:00

## 2023-07-05 RX ADMIN — NEPHROCAP 1 MG: 1 CAP ORAL at 18:43

## 2023-07-05 RX ADMIN — ISOSORBIDE DINITRATE 20 MG: 20 TABLET ORAL at 12:10

## 2023-07-05 RX ADMIN — METOPROLOL SUCCINATE 100 MG: 50 TABLET, EXTENDED RELEASE ORAL at 11:29

## 2023-07-05 RX ADMIN — SODIUM CHLORIDE, PRESERVATIVE FREE 10 ML: 5 INJECTION INTRAVENOUS at 21:03

## 2023-07-05 RX ADMIN — TRAZODONE HYDROCHLORIDE 50 MG: 50 TABLET ORAL at 21:00

## 2023-07-05 RX ADMIN — HYDROMORPHONE HYDROCHLORIDE 1 MG: 1 INJECTION, SOLUTION INTRAMUSCULAR; INTRAVENOUS; SUBCUTANEOUS at 22:38

## 2023-07-05 RX ADMIN — CLOPIDOGREL BISULFATE 75 MG: 75 TABLET, FILM COATED ORAL at 18:43

## 2023-07-05 RX ADMIN — TORSEMIDE 100 MG: 100 TABLET ORAL at 12:49

## 2023-07-05 RX ADMIN — APIXABAN 5 MG: 5 TABLET, FILM COATED ORAL at 21:00

## 2023-07-05 ASSESSMENT — PAIN DESCRIPTION - LOCATION: LOCATION: BACK;HIP

## 2023-07-05 ASSESSMENT — PAIN SCALES - GENERAL
PAINLEVEL_OUTOF10: 10
PAINLEVEL_OUTOF10: 10
PAINLEVEL_OUTOF10: 6
PAINLEVEL_OUTOF10: 10

## 2023-07-05 ASSESSMENT — PAIN DESCRIPTION - ORIENTATION: ORIENTATION: LOWER

## 2023-07-05 NOTE — ED NOTES
REPORT GIVEN TO INPATIENT RN. PT TO GO TO DIALYSIS NOW. WAITING ON TRANSPORT.       Malcolm Estimable, NAVJOT  07/05/23 [id number]

## 2023-07-05 NOTE — H&P
Hospital Medicine History & Physical      Date of Admission: 7/5/2023    Date of Service:  Pt seen/examined on 5 July     [x]Admitted to Inpatient with expected LOS greater than two midnights due to medical therapy. []Placed in Observation status. Chief Admission Complaint:  peripheral neuropathy w/ severe bilateral foot pain. Presenting Admission History:      52 y.o. obese male w/ hx ESRD on HD M/W/F and severe CHF s/p R foot toe resection 5 weeks ago, discharged to a SNF for rehab and then ultimately to home who presented to [facility] unable to care for himself. He did NOT receive his scheduled HD Wed 5 July. He has had TD type movements all day which is atypical.     The patient denies any fever/chills or other signs/sxs of systemic illness or identifiable aggravating/alleviating factors. Assessment/Plan:      Current Principal Problem:  ESRD (end stage renal disease) ([address])       Right foot toe gangrenous ulceration - Taken to OR 26 May s/p I&D w/ 5th ray amputation w/out complications. Chronic LE neuropathy - continue Gabapentin. Essentially unable to care for self and home. PO/IV Narcotic analgesia as ordered. ESRD - on HD M/W/F. On home Torsemide - continued. Nephrology consulted and appreciated in advance. CHF -  combined sytolic failure w/ reduced EF 25-30% by Echo March 2023. Likely due to hypertensive heart disease. Continue current medical management and followed I/O as well as clinical response. HTN/CAD - w/ known CAD but no evidence of active signs/sxs of ischemia/failure. Currently controlled on home meds. S/P LHCath 10 May 2023 w/ PATRICIA x 2 w/out complications     DM2 - diet 'controlled' on home oral antiGlycemics/Insulin - held/continued. Followed FSBS/SSI medium regimen. Last HbA1c 9.3% dated this admission. Anticipate some kind of PO home regimen to start post-discharge. S/P TAVR - Continue Plavix/Pravastatin.      COPD - w/ chronic respiratory failure

## 2023-07-05 NOTE — DIALYSIS
Treatment time: 2 hours  Net UF: 1100 ml     Pre weight: 103.4 kg  Post weight:102.2 kg  EDW: 108 kg (challenging)  Crit Line Used: Yes Ending Profile: A  Refill Present: No    Access used: L TDC    Access function: Well with  ml/min     Medications or blood products given: n/a     Regular outpatient schedule: MWF [address]     Summary of response to treatment: Patient tolerated treatment well and without any complications. Patient remained stable throughout entire treatment and upon the exiting the dialysis suite via transport. Report given to Debbi Cruz RN and copy of dialysis treatment record placed in chart, to be scanned into EMR.

## 2023-07-06 LAB
ALBUMIN SERPL-MCNC: 3.3 G/DL (ref 3.4–5)
ANION GAP SERPL CALCULATED.3IONS-SCNC: 12 MMOL/L (ref 3–16)
BUN SERPL-MCNC: 38 MG/DL (ref 7–20)
CALCIUM SERPL-MCNC: 8.1 MG/DL (ref 8.3–10.6)
CHLORIDE SERPL-SCNC: 97 MMOL/L (ref 99–110)
CO2 SERPL-SCNC: 26 MMOL/L (ref 21–32)
CREAT SERPL-MCNC: 6.7 MG/DL (ref 0.9–1.3)
DEPRECATED RDW RBC AUTO: 16.7 % (ref 12.4–15.4)
EST. AVERAGE GLUCOSE BLD GHB EST-MCNC: 142.7 MG/DL
GFR SERPLBLD CREATININE-BSD FMLA CKD-EPI: 9 ML/MIN/{1.73_M2}
GLUCOSE BLD-MCNC: 113 MG/DL (ref 70–99)
GLUCOSE BLD-MCNC: 130 MG/DL (ref 70–99)
GLUCOSE BLD-MCNC: 137 MG/DL (ref 70–99)
GLUCOSE SERPL-MCNC: 109 MG/DL (ref 70–99)
HBA1C MFR BLD: 6.6 %
HCT VFR BLD AUTO: 25 % (ref 40.5–52.5)
HGB BLD-MCNC: 8.2 G/DL (ref 13.5–17.5)
MAGNESIUM SERPL-MCNC: 1.6 MG/DL (ref 1.8–2.4)
MCH RBC QN AUTO: 29.2 PG (ref 26–34)
MCHC RBC AUTO-ENTMCNC: 32.9 G/DL (ref 31–36)
MCV RBC AUTO: 88.7 FL (ref 80–100)
PERFORMED ON: ABNORMAL
PHOSPHATE SERPL-MCNC: 1.9 MG/DL (ref 2.5–4.9)
PLATELET # BLD AUTO: 129 K/UL (ref 135–450)
PMV BLD AUTO: 8 FL (ref 5–10.5)
POTASSIUM SERPL-SCNC: 3.4 MMOL/L (ref 3.5–5.1)
RBC # BLD AUTO: 2.82 M/UL (ref 4.2–5.9)
SODIUM SERPL-SCNC: 135 MMOL/L (ref 136–145)
WBC # BLD AUTO: 7.2 K/UL (ref 4–11)

## 2023-07-06 PROCEDURE — 2700000000 HC OXYGEN THERAPY PER DAY

## 2023-07-06 PROCEDURE — 1200000000 HC SEMI PRIVATE

## 2023-07-06 PROCEDURE — 97535 SELF CARE MNGMENT TRAINING: CPT

## 2023-07-06 PROCEDURE — 2580000003 HC RX 258: Performed by: INTERNAL MEDICINE

## 2023-07-06 PROCEDURE — P9047 ALBUMIN (HUMAN), 25%, 50ML: HCPCS

## 2023-07-06 PROCEDURE — 6370000000 HC RX 637 (ALT 250 FOR IP): Performed by: INTERNAL MEDICINE

## 2023-07-06 PROCEDURE — 6360000002 HC RX W HCPCS

## 2023-07-06 PROCEDURE — 83036 HEMOGLOBIN GLYCOSYLATED A1C: CPT

## 2023-07-06 PROCEDURE — 6370000000 HC RX 637 (ALT 250 FOR IP)

## 2023-07-06 PROCEDURE — 94761 N-INVAS EAR/PLS OXIMETRY MLT: CPT

## 2023-07-06 PROCEDURE — 85027 COMPLETE CBC AUTOMATED: CPT

## 2023-07-06 PROCEDURE — 97530 THERAPEUTIC ACTIVITIES: CPT

## 2023-07-06 PROCEDURE — 2500000003 HC RX 250 WO HCPCS: Performed by: INTERNAL MEDICINE

## 2023-07-06 PROCEDURE — 90935 HEMODIALYSIS ONE EVALUATION: CPT

## 2023-07-06 PROCEDURE — 80069 RENAL FUNCTION PANEL: CPT

## 2023-07-06 PROCEDURE — 36415 COLL VENOUS BLD VENIPUNCTURE: CPT

## 2023-07-06 PROCEDURE — 83735 ASSAY OF MAGNESIUM: CPT

## 2023-07-06 RX ORDER — MIDODRINE HYDROCHLORIDE 5 MG/1
TABLET ORAL
Status: COMPLETED
Start: 2023-07-06 | End: 2023-07-06

## 2023-07-06 RX ORDER — LANOLIN ALCOHOL/MO/W.PET/CERES
800 CREAM (GRAM) TOPICAL ONCE
Status: COMPLETED | OUTPATIENT
Start: 2023-07-06 | End: 2023-07-06

## 2023-07-06 RX ORDER — ALBUMIN (HUMAN) 12.5 G/50ML
25 SOLUTION INTRAVENOUS ONCE
Status: COMPLETED | OUTPATIENT
Start: 2023-07-06 | End: 2023-07-06

## 2023-07-06 RX ORDER — MIDODRINE HYDROCHLORIDE 5 MG/1
10 TABLET ORAL ONCE
Status: COMPLETED | OUTPATIENT
Start: 2023-07-06 | End: 2023-07-06

## 2023-07-06 RX ORDER — ALBUMIN (HUMAN) 12.5 G/50ML
SOLUTION INTRAVENOUS
Status: COMPLETED
Start: 2023-07-06 | End: 2023-07-06

## 2023-07-06 RX ORDER — HEPARIN SODIUM 1000 [USP'U]/ML
INJECTION, SOLUTION INTRAVENOUS; SUBCUTANEOUS
Status: COMPLETED
Start: 2023-07-06 | End: 2023-07-06

## 2023-07-06 RX ADMIN — DIBASIC SODIUM PHOSPHATE, MONOBASIC POTASSIUM PHOSPHATE AND MONOBASIC SODIUM PHOSPHATE 2 TABLET: 852; 155; 130 TABLET ORAL at 19:38

## 2023-07-06 RX ADMIN — SODIUM CHLORIDE, PRESERVATIVE FREE 10 ML: 5 INJECTION INTRAVENOUS at 20:36

## 2023-07-06 RX ADMIN — GABAPENTIN 100 MG: 100 CAPSULE ORAL at 12:47

## 2023-07-06 RX ADMIN — ISOSORBIDE DINITRATE 20 MG: 20 TABLET ORAL at 20:33

## 2023-07-06 RX ADMIN — TORSEMIDE 100 MG: 100 TABLET ORAL at 12:48

## 2023-07-06 RX ADMIN — MIDODRINE HYDROCHLORIDE 10 MG: 5 TABLET ORAL at 09:30

## 2023-07-06 RX ADMIN — CALCIUM ACETATE 667 MG: 667 CAPSULE ORAL at 12:44

## 2023-07-06 RX ADMIN — ONDANSETRON 4 MG: 4 TABLET, ORALLY DISINTEGRATING ORAL at 14:28

## 2023-07-06 RX ADMIN — GABAPENTIN 100 MG: 100 CAPSULE ORAL at 20:33

## 2023-07-06 RX ADMIN — Medication 800 MG: at 16:10

## 2023-07-06 RX ADMIN — HEPARIN SODIUM 3600 UNITS: 1000 INJECTION, SOLUTION INTRAVENOUS; SUBCUTANEOUS at 09:50

## 2023-07-06 RX ADMIN — DULOXETINE 60 MG: 60 CAPSULE, DELAYED RELEASE ORAL at 12:59

## 2023-07-06 RX ADMIN — PRAVASTATIN SODIUM 40 MG: 40 TABLET ORAL at 12:47

## 2023-07-06 RX ADMIN — OXYCODONE HYDROCHLORIDE 10 MG: 5 TABLET ORAL at 13:06

## 2023-07-06 RX ADMIN — CLOPIDOGREL BISULFATE 75 MG: 75 TABLET, FILM COATED ORAL at 12:47

## 2023-07-06 RX ADMIN — ALBUMIN (HUMAN) 25 G: 0.25 INJECTION, SOLUTION INTRAVENOUS at 09:28

## 2023-07-06 RX ADMIN — APIXABAN 5 MG: 5 TABLET, FILM COATED ORAL at 20:33

## 2023-07-06 RX ADMIN — OXYCODONE HYDROCHLORIDE 10 MG: 5 TABLET ORAL at 18:39

## 2023-07-06 RX ADMIN — QUETIAPINE FUMARATE 50 MG: 25 TABLET ORAL at 20:33

## 2023-07-06 RX ADMIN — DIBASIC SODIUM PHOSPHATE, MONOBASIC POTASSIUM PHOSPHATE AND MONOBASIC SODIUM PHOSPHATE 2 TABLET: 852; 155; 130 TABLET ORAL at 18:29

## 2023-07-06 RX ADMIN — ALBUMIN (HUMAN) 25 G: 12.5 SOLUTION INTRAVENOUS at 09:28

## 2023-07-06 RX ADMIN — APIXABAN 5 MG: 5 TABLET, FILM COATED ORAL at 12:44

## 2023-07-06 RX ADMIN — NEPHROCAP 1 MG: 1 CAP ORAL at 12:48

## 2023-07-06 RX ADMIN — TRAZODONE HYDROCHLORIDE 50 MG: 50 TABLET ORAL at 20:33

## 2023-07-06 RX ADMIN — ISOSORBIDE DINITRATE 20 MG: 20 TABLET ORAL at 12:47

## 2023-07-06 RX ADMIN — METOPROLOL SUCCINATE 100 MG: 50 TABLET, EXTENDED RELEASE ORAL at 20:33

## 2023-07-06 ASSESSMENT — PAIN SCALES - GENERAL
PAINLEVEL_OUTOF10: 9
PAINLEVEL_OUTOF10: 8

## 2023-07-06 ASSESSMENT — PAIN DESCRIPTION - ORIENTATION: ORIENTATION: LOWER

## 2023-07-06 ASSESSMENT — PAIN DESCRIPTION - LOCATION: LOCATION: BACK

## 2023-07-06 NOTE — PLAN OF CARE
Problem: Discharge Planning  Goal: Discharge to home or other facility with appropriate resources  Outcome: Progressing  Flowsheets (Taken 7/5/2023 2110)  Discharge to home or other facility with appropriate resources:   Identify barriers to discharge with patient and caregiver   Arrange for needed discharge resources and transportation as appropriate   Identify discharge learning needs (meds, wound care, etc)     Problem: Pain  Goal: Verbalizes/displays adequate comfort level or baseline comfort level  Outcome: Progressing     Problem: Safety - Adult  Goal: Free from fall injury  Outcome: Progressing     Problem: ABCDS Injury Assessment  Goal: Absence of physical injury  Outcome: Progressing     Problem: Skin/Tissue Integrity  Goal: Absence of new skin breakdown  Description: 1. Monitor for areas of redness and/or skin breakdown  2. Assess vascular access sites hourly  3. Every 4-6 hours minimum:  Change oxygen saturation probe site  4. Every 4-6 hours:  If on nasal continuous positive airway pressure, respiratory therapy assess nares and determine need for appliance change or resting period.   Outcome: Progressing

## 2023-07-06 NOTE — ED PROVIDER NOTES
AUTO DIFFERENTIAL - Abnormal; Notable for the following components:       Result Value    RBC 3.26 (*)     Hemoglobin 9.6 (*)     Hematocrit 29.3 (*)     RDW 16.9 (*)     Neutrophils Absolute 9.2 (*)     Lymphocytes Absolute 0.8 (*)     All other components within normal limits   COMPREHENSIVE METABOLIC PANEL - Abnormal; Notable for the following components:    Sodium 130 (*)     Potassium 3.2 (*)     Chloride 89 (*)     CO2 18 (*)     Anion Gap 23 (*)     Glucose 129 (*)     BUN 61 (*)     Creatinine 8.1 (*)     Est, Glom Filt Rate 7 (*)     AST 12 (*)     All other components within normal limits    Narrative:     Marce [facility] tel. [phone],  Chemistry results called to and read back by Mily Gray RN, 07/05/2023  11:22, by [facility]   PROCALCITONIN - Abnormal; Notable for the following components:    Procalcitonin 0.48 (*)     All other components within normal limits   LACTIC ACID - Abnormal; Notable for the following components:    Lactic Acid 4.7 (*)     All other components within normal limits    Narrative:     CALL  [place] tel. [phone],  Chemistry results called to and read back by Becky Bernstein RN, 07/05/2023 11:09,  by [facility]   TROPONIN - Abnormal; Notable for the following components:    Troponin, High Sensitivity 106 (*)     All other components within normal limits   CBC - Abnormal; Notable for the following components:    RBC 2.82 (*)     Hemoglobin 8.2 (*)     Hematocrit 25.0 (*)     RDW 16.7 (*)     Platelets 163 (*)     All other components within normal limits    Narrative:     Collection has been rescheduled by Leslee Pope at 07/06/2023 05:49 Reason: Mariama Arellano aware that patient has declined his am labs    RENAL FUNCTION PANEL - Abnormal; Notable for the following components:    Sodium 135 (*)     Potassium 3.4 (*)     Chloride 97 (*)     Glucose 109 (*)     BUN 38 (*)     Creatinine 6.7 (*)     Est, Glom Filt Rate 9 (*)     Calcium 8.1 (*)     Phosphorus 1.9 (*)     Albumin 3.3 (*)     All

## 2023-07-07 VITALS
OXYGEN SATURATION: 95 % | BODY MASS INDEX: 34.42 KG/M2 | SYSTOLIC BLOOD PRESSURE: 119 MMHG | WEIGHT: 232.37 LBS | HEART RATE: 90 BPM | HEIGHT: 69 IN | DIASTOLIC BLOOD PRESSURE: 60 MMHG | RESPIRATION RATE: 18 BRPM | TEMPERATURE: 98.5 F

## 2023-07-07 LAB
ALBUMIN SERPL-MCNC: 3.3 G/DL (ref 3.4–5)
ANION GAP SERPL CALCULATED.3IONS-SCNC: 14 MMOL/L (ref 3–16)
BUN SERPL-MCNC: 37 MG/DL (ref 7–20)
CALCIUM SERPL-MCNC: 8.1 MG/DL (ref 8.3–10.6)
CHLORIDE SERPL-SCNC: 97 MMOL/L (ref 99–110)
CO2 SERPL-SCNC: 24 MMOL/L (ref 21–32)
CREAT SERPL-MCNC: 6.1 MG/DL (ref 0.9–1.3)
DEPRECATED RDW RBC AUTO: 16.6 % (ref 12.4–15.4)
GFR SERPLBLD CREATININE-BSD FMLA CKD-EPI: 10 ML/MIN/{1.73_M2}
GLUCOSE BLD-MCNC: 138 MG/DL (ref 70–99)
GLUCOSE BLD-MCNC: 156 MG/DL (ref 70–99)
GLUCOSE BLD-MCNC: 171 MG/DL (ref 70–99)
GLUCOSE BLD-MCNC: 193 MG/DL (ref 70–99)
GLUCOSE SERPL-MCNC: 127 MG/DL (ref 70–99)
HCT VFR BLD AUTO: 26.9 % (ref 40.5–52.5)
HGB BLD-MCNC: 8.9 G/DL (ref 13.5–17.5)
MCH RBC QN AUTO: 29.6 PG (ref 26–34)
MCHC RBC AUTO-ENTMCNC: 33 G/DL (ref 31–36)
MCV RBC AUTO: 89.7 FL (ref 80–100)
PERFORMED ON: ABNORMAL
PHOSPHATE SERPL-MCNC: 1.9 MG/DL (ref 2.5–4.9)
PLATELET # BLD AUTO: 118 K/UL (ref 135–450)
PMV BLD AUTO: 7.8 FL (ref 5–10.5)
POTASSIUM SERPL-SCNC: 3.4 MMOL/L (ref 3.5–5.1)
RBC # BLD AUTO: 3 M/UL (ref 4.2–5.9)
SODIUM SERPL-SCNC: 135 MMOL/L (ref 136–145)
WBC # BLD AUTO: 7.1 K/UL (ref 4–11)

## 2023-07-07 PROCEDURE — 6370000000 HC RX 637 (ALT 250 FOR IP): Performed by: INTERNAL MEDICINE

## 2023-07-07 PROCEDURE — 6370000000 HC RX 637 (ALT 250 FOR IP)

## 2023-07-07 PROCEDURE — 2580000003 HC RX 258: Performed by: INTERNAL MEDICINE

## 2023-07-07 PROCEDURE — 6360000002 HC RX W HCPCS

## 2023-07-07 PROCEDURE — 2700000000 HC OXYGEN THERAPY PER DAY

## 2023-07-07 PROCEDURE — 85027 COMPLETE CBC AUTOMATED: CPT

## 2023-07-07 PROCEDURE — 80069 RENAL FUNCTION PANEL: CPT

## 2023-07-07 PROCEDURE — 90935 HEMODIALYSIS ONE EVALUATION: CPT

## 2023-07-07 PROCEDURE — 94761 N-INVAS EAR/PLS OXIMETRY MLT: CPT

## 2023-07-07 PROCEDURE — 2500000003 HC RX 250 WO HCPCS: Performed by: INTERNAL MEDICINE

## 2023-07-07 RX ORDER — OXYCODONE HYDROCHLORIDE 5 MG/1
5 TABLET ORAL EVERY 6 HOURS PRN
Qty: 28 TABLET | Refills: 0 | Status: SHIPPED | OUTPATIENT
Start: 2023-07-07 | End: 2023-07-14

## 2023-07-07 RX ORDER — MIDODRINE HYDROCHLORIDE 5 MG/1
TABLET ORAL
Status: COMPLETED
Start: 2023-07-07 | End: 2023-07-07

## 2023-07-07 RX ORDER — HEPARIN SODIUM 1000 [USP'U]/ML
INJECTION, SOLUTION INTRAVENOUS; SUBCUTANEOUS
Status: COMPLETED
Start: 2023-07-07 | End: 2023-07-07

## 2023-07-07 RX ORDER — MIDODRINE HYDROCHLORIDE 5 MG/1
10 TABLET ORAL AS NEEDED
Status: DISCONTINUED | OUTPATIENT
Start: 2023-07-07 | End: 2023-07-07 | Stop reason: HOSPADM

## 2023-07-07 RX ORDER — CASTOR OIL AND BALSAM, PERU 788; 87 MG/G; MG/G
OINTMENT TOPICAL 2 TIMES DAILY
Status: DISCONTINUED | OUTPATIENT
Start: 2023-07-07 | End: 2023-07-07 | Stop reason: HOSPADM

## 2023-07-07 RX ADMIN — CLOPIDOGREL BISULFATE 75 MG: 75 TABLET, FILM COATED ORAL at 09:00

## 2023-07-07 RX ADMIN — ISOSORBIDE DINITRATE 20 MG: 20 TABLET ORAL at 09:00

## 2023-07-07 RX ADMIN — SODIUM PHOSPHATE, MONOBASIC, MONOHYDRATE AND SODIUM PHOSPHATE, DIBASIC, ANHYDROUS 20 MMOL: 142; 276 INJECTION, SOLUTION INTRAVENOUS at 16:16

## 2023-07-07 RX ADMIN — SODIUM CHLORIDE, PRESERVATIVE FREE 10 ML: 5 INJECTION INTRAVENOUS at 09:00

## 2023-07-07 RX ADMIN — HEPARIN SODIUM 3600 UNITS: 1000 INJECTION INTRAVENOUS; SUBCUTANEOUS at 10:49

## 2023-07-07 RX ADMIN — ACETAMINOPHEN 650 MG: 325 TABLET ORAL at 00:51

## 2023-07-07 RX ADMIN — PANTOPRAZOLE SODIUM 40 MG: 40 TABLET, DELAYED RELEASE ORAL at 09:00

## 2023-07-07 RX ADMIN — PRAVASTATIN SODIUM 40 MG: 40 TABLET ORAL at 09:00

## 2023-07-07 RX ADMIN — OXYCODONE HYDROCHLORIDE 10 MG: 5 TABLET ORAL at 20:20

## 2023-07-07 RX ADMIN — OXYCODONE HYDROCHLORIDE 10 MG: 5 TABLET ORAL at 00:51

## 2023-07-07 RX ADMIN — EPOETIN ALFA-EPBX 5000 UNITS: 10000 INJECTION, SOLUTION INTRAVENOUS; SUBCUTANEOUS at 10:49

## 2023-07-07 RX ADMIN — GABAPENTIN 100 MG: 100 CAPSULE ORAL at 09:00

## 2023-07-07 RX ADMIN — HEPARIN SODIUM 3600 UNITS: 1000 INJECTION, SOLUTION INTRAVENOUS; SUBCUTANEOUS at 10:49

## 2023-07-07 RX ADMIN — MIDODRINE HYDROCHLORIDE 10 MG: 5 TABLET ORAL at 10:48

## 2023-07-07 RX ADMIN — NEPHROCAP 1 MG: 1 CAP ORAL at 09:00

## 2023-07-07 RX ADMIN — APIXABAN 5 MG: 5 TABLET, FILM COATED ORAL at 09:00

## 2023-07-07 RX ADMIN — ISOSORBIDE DINITRATE 20 MG: 20 TABLET ORAL at 15:01

## 2023-07-07 RX ADMIN — GABAPENTIN 100 MG: 100 CAPSULE ORAL at 15:01

## 2023-07-07 RX ADMIN — DULOXETINE 60 MG: 60 CAPSULE, DELAYED RELEASE ORAL at 09:00

## 2023-07-07 ASSESSMENT — PAIN SCALES - GENERAL
PAINLEVEL_OUTOF10: 10
PAINLEVEL_OUTOF10: 5
PAINLEVEL_OUTOF10: 10

## 2023-07-07 ASSESSMENT — PAIN DESCRIPTION - LOCATION: LOCATION: OTHER (COMMENT)

## 2023-07-07 ASSESSMENT — PAIN DESCRIPTION - DESCRIPTORS: DESCRIPTORS: ACHING;DISCOMFORT

## 2023-07-07 ASSESSMENT — PAIN DESCRIPTION - PAIN TYPE: TYPE: ACUTE PAIN;CHRONIC PAIN

## 2023-07-07 ASSESSMENT — PAIN DESCRIPTION - ORIENTATION: ORIENTATION: RIGHT

## 2023-07-07 NOTE — CARE COORDINATION
CASE MANAGEMENT DISCHARGE SUMMARY      Discharge to: [facility]    Precertification completed: 7/7/2023    IMM given: 7/7/2023    Transportation:      Medical Transport explained to [place]. Pt/family voice no agency preference. Agency used: [address] ambulance  [address] [facility] up time: 1930   Ambulance form completed: Yes    Confirmed discharge plan with:     Patient: yes     Family:  yes, message left with spouse     Facility/Agency, name:  [facility] faxed   Phone number for report to facility: [phone]     RN, name: Loren Hernandes  Pt will have dialysis at the [address] [place] [address] Road.   Keisha Park RN     [facility] [phone]
Cert approved to the [facility].  Kristina Camargo RN
Chart reviewed day 1. Care provided by nephron and IM. Pt was at [facility] and was discharged 7/3 to home. Pt was brought back in due to ESRD. Pt is agreeable to go back to [facility] and they are willing to accept. Cert has been started. Arnolderwin Hess ([phone] ext [phone]) from [facility] contacted me and is asking for updated PT/OT notes. I also spoke with Prakash Johnson ( from [place], [phone]) and she states pt does not want to return to [facility]. I spoke with him this afternoon and he states yes he will return there. Rodger await updated notes and send them through [facility]. Continue to follow course. Andres Mathew RN     Per [facility] at [facility], HD is in place and ready for when he gets back to them. Andres Mathew RN     OT note uploaded to [facility]. PT unable to see pt, will look for their note tomorrow.  Andres Mathew RN
home.  Patient reports his wife is unable to accept. Patient states he was very happy with [facility] and is interested in returning. Spoke to [facility]. They are agreeable to accept patient. Need to arrange HD with Tawanna Gaffney at [address]. Discussed with facility and patient he can stay at [facility] under his medicaid long term. Patient and facility agreeable. Precert submitted via [employer] and pending. The Plan for Transition of Care is related to the following treatment goals of ESRD (end stage renal disease) ([address]) [D35.5]    IF APPLICABLE: The Patient and/or patient representative Zeus Horvath and his family were provided with a choice of provider and agrees with the discharge plan. Freedom of choice list with basic dialogue that supports the patient's individualized plan of care/goals and shares the quality data associated with the providers was provided to:     Patient Representative Name:       The Patient and/or Patient Representative Agree with the Discharge Plan?       NINO Polk, SALVADOR-S   Case Management Department  Ph: [phone] Fax: [phone]

## 2023-07-07 NOTE — PLAN OF CARE
Problem: Discharge Planning  Goal: Discharge to home or other facility with appropriate resources  Outcome: Adequate for Discharge     Problem: Pain  Goal: Verbalizes/displays adequate comfort level or baseline comfort level  Outcome: Adequate for Discharge     Problem: Safety - Adult  Goal: Free from fall injury  Outcome: Adequate for Discharge     Problem: ABCDS Injury Assessment  Goal: Absence of physical injury  Outcome: Adequate for Discharge     Problem: Skin/Tissue Integrity  Goal: Absence of new skin breakdown  Description: 1. Monitor for areas of redness and/or skin breakdown  2. Assess vascular access sites hourly  3. Every 4-6 hours minimum:  Change oxygen saturation probe site  4. Every 4-6 hours:  If on nasal continuous positive airway pressure, respiratory therapy assess nares and determine need for appliance change or resting period.   Outcome: Adequate for Discharge     Problem: Chronic Conditions and Co-morbidities  Goal: Patient's chronic conditions and co-morbidity symptoms are monitored and maintained or improved  Outcome: Adequate for Discharge

## 2023-07-07 NOTE — DISCHARGE INSTR - COC
Continuity of Care Form    Patient Name: Kati Rodrigues   :  1970  MRN:  [id number]    Admit date:  2023  Discharge date:  2023    Code Status Order: Limited   Advance Directives:     Admitting Physician:  Chi Rosas MD  PCP: Lola Funez MD    Discharging Nurse: [address] [place] [address] Unit/Room#: 0671/0231-93  Discharging Unit Phone Number: [phone]    Emergency Contact:   Extended Emergency Contact Information  Primary Emergency Contact: AnnCrystal sheehan [facility]  Address: [address] [place] [address], [facility] [address] [facility] of [address] [place] [address] [place] Phone: [phone]  Mobile Phone: [phone]  Relation: Spouse  Secondary Emergency Contact: ALICIA HARO JR. [facility] Phone: [phone]  Mobile Phone: [phone]  Relation: Brother/Sister  Preferred language: English   needed?  No    Past Surgical History:  Past Surgical History:   Procedure Laterality Date    AORTIC VALVE REPLACEMENT N/A 10/15/2019    TRANSCATHETER AORTIC VALVE REPLACEMENT FEMORAL APPROACH performed by Luna Vanegas MD at [address] [facility] Right 2019    PERITONEAL DIALYSIS CATHETER REMOVAL performed by David Quevedo MD at [address] [place] [address]      [facility]    CORONARY ANGIOPLASTY WITH STENT PLACEMENT  05/26/15    CYST REMOVAL  2013    EXCISION CYSTS, NECK X2 AND ABDOMINAL benign    DIAGNOSTIC CARDIAC CATH LAB PROCEDURE      DIALYSIS FISTULA CREATION Left 10/30/2017    LEFT BRACHIAL CEPHALIC FISTULA    DIALYSIS FISTULA CREATION Left 3/27/2019    LIGATION  AV FISTULA performed by Cheng Ruiz MD at [facility], [facility] Right 2023    INCISION AND DEBRIDEMENT RIGHT FOOT WITH performed by Kei Cabrera DPM at [facility] Right 2023    RIGHT FIFTH RAY AMPUTATION performed by Kei Cabrera DPM at [facility]

## 2023-07-07 NOTE — CONSULTS
[employer]  Nephrology Consult Note           Reason for Consult:  ESRD  Requesting Physician:  Dr. Dwayne Garcia     Chief Complaint:    Chief Complaint   Patient presents with    Numbness     Pt reporting BILATERAL leg pain and numbness since yesterday. Reports he had some toes on his right foot amputated a week ago. Patient reports every time he gets up to move he falls. Reports he missed dialysis today because he fell.       History of Present Illness on 7/5/23:    52 y.o. yo male with PMH of ESKD on HD MWF, CAD s/p stents, PAD, DM2, COPD, sCHF on home oxygen 4 L NC  who is admitted for numbness and weakness of both legs since 7/4  He missed OP HD on 7/5 as he fell  He is receiving HD in the hospital     Past Medical History:        Diagnosis Date    Ambulatory dysfunction     walker for long distances, SOB with distance    Aortic stenosis     echo 2017    Arthritis     hands and hips    Asthma     Bilateral hilar adenopathy syndrome 6/3/2013    CAD (coronary artery disease)     Dr. Fernande Buerger [facility]) 04/19/2019    EF= 43%    CHF (congestive heart failure) ([address])     Chronic pain     COPD (chronic obstructive pulmonary disease) ([address])     pulmonology Dr. Carson Stout    Depression     Diabetes mellitus ([address])     borderline    Difficult intravenous access     Emphysema of lung ([address])     ESRD (end stage renal disease) on dialysis ([address])     MWF    Fear of needles     Gastric ulcer     GERD (gastroesophageal reflux disease)     Heart valve problem     bicuspic valve    Hemodialysis patient ([address])     History of spinal fracture     work incident    Hx of blood clots     Bilateral lower extremities; stents in place    Hyperlipidemia     Hypertension     MI (myocardial infarction) ([address]) 2019    has had 9 MIs. 2019 was the last    Neuromuscular disorder ([address])     due to CVA    Numbness and tingling in left arm     from fistula    Pneumonia     PONV (postoperative nausea and vomiting)     Prolonged
Foot Ulcers Offloading ordered 07/07/23 1717   Dressing Change Due 07/08/23 07/07/23 1717   Wound Length (cm) 2 cm 07/07/23 1717   Wound Width (cm) 1.2 cm 07/07/23 1717   Wound Depth (cm) 0 cm 07/07/23 1717   Wound Surface Area (cm^2) 2.4 cm^2 07/07/23 1717   Wound Volume (cm^3) 0 cm^3 07/07/23 1717   Distance Tunneling (cm) 0 cm 07/07/23 1717   Tunneling Position ___ O'Clock 0 07/07/23 1717   Undermining Starts ___ O'Clock 0 07/07/23 1717   Undermining Ends___ O'Clock 0 07/07/23 1717   Undermining Maxium Distance (cm) 0 07/07/23 1717   Wound Assessment Dry 07/07/23 1717   Drainage Amount None 07/07/23 1717   Odor None 07/07/23 1717   Liberty-wound Assessment Blanchable erythema;Dry/flaky 07/07/23 1717   Margins Attached edges 07/07/23 1717   Number of days: 0     Rt heel        Incision 05/26/23 Foot Right (Active)   Wound Image   07/07/23 1713   Dressing Status New dressing applied 07/07/23 1713   Dressing Change Due 07/08/23 07/07/23 1713   Incision Cleansed Cleansed with saline 07/07/23 1713   Dressing/Treatment Alginate with Ag;Dry dressing;Roll gauze; Ace wrap 07/07/23 1713   Incision Length (cm) 2 07/07/23 1713   Incision Width (cm) 7 cm 07/07/23 1713   Incision Depth (cm) 0.2 cm 07/07/23 1713   Closure Sutures 06/07/23 0745   Margins Approximated 06/07/23 0745   Incision Assessment Eschar 07/07/23 1713   Drainage Amount Moderate 07/07/23 1713   Drainage Description Serosanguinous 07/07/23 1713   Odor Mild 07/07/23 1713   Liberty-incision Assessment Blanchable erythema 07/07/23 1713   Number of days: 42           Response to treatment:  Well tolerated by patient.      Pain Assessment:  Severity:  0 / 10  Quality of pain: N/A  Wound Pain Timing/Severity: none  Premedicated: No    Plan   Plan of Care: Wound 05/22/23 Toe (Comment  which one) Left;Dorsal dry-Dressing/Treatment: Open to air  Wound 05/03/23 Toe (Comment  which one) Right;Left;Distal Right and left distal toes scabs-Dressing/Treatment: Open to air  Wound

## 2023-07-08 NOTE — DISCHARGE SUMMARY
Hospital Medicine Discharge Summary    Patient: Julienne Quispe   : 1970     Admit Date: 2023   Discharge Date: 2023    Disposition:  []Home   []C  SNF - [facility]  []Acute Rehab  []LTAC  []Hospice  Code status:  [x]Full  []DNR/CCA  []Limited (DNR/CCA with Do Not Intubate)  []DNRCC  Condition at Discharge: Stable  Primary Care Provider: Eddie Solomon MD    Admitting Provider: Won Zurita MD  Discharge Provider: Won Zurita MD     Discharge Diagnoses: Active Hospital Problems    Diagnosis     ESRD (end stage renal disease) ([address] ) [N18.6]        Presenting Admission History:      52 y.o. obese male w/ hx ESRD on HD M// and severe CHF s/p R foot toe resection 5 weeks ago, discharged to a SNF for rehab and then ultimately to home who presented to [facility] unable to care for himself. He did NOT receive his scheduled HD . He has had TD type movements all day which is atypical.      The patient denies any fever/chills or other signs/sxs of systemic illness or identifiable aggravating/alleviating factors. Assessment/Plan:      Current Principal Problem:  ESRD (end stage renal disease) ([address])        Right foot toe gangrenous ulceration - Taken to OR 26 May s/p I&D w/ 5th ray amputation w/out complications. Chronic LE neuropathy - continue Gabapentin. Essentially unable to care for self at home. PO/IV Narcotic analgesia as ordered. ESRD - on HD M/W/F. On home Torsemide - continued. Nephrology consulted and appreciated in advance. CHF -  combined sytolic failure w/ reduced EF 25-30% by Echo 2023. Likely due to hypertensive heart disease. Continue current medical management and followed I/O as well as clinical response. HTN/CAD - w/ known CAD but no evidence of active signs/sxs of ischemia/failure. Currently controlled on home meds.  S/P LHCath 10 May 2023 w/ PATRICIA x 2 w/out complications     DM2 - diet 'controlled' on home oral

## 2023-07-11 LAB
ACID FAST STN SPEC QL: NORMAL
MYCOBACTERIUM SPEC CULT: NORMAL

## 2023-07-26 ENCOUNTER — CLINICAL DOCUMENTATION (OUTPATIENT)
Dept: CASE MANAGEMENT | Age: 53
End: 2023-07-26

## 2023-07-26 NOTE — MANAGEMENT PLAN
Patient Management Plan              Pt. Name: Cheryle Coffin  : 1970           Date plan entered: 22        Patients Physicians:     Primary Care  : Michael Vargas MD  Contact #:  [phone]  Specialists:    Contact #:                                                   Summary        Reason for Referral: This patient has been provided a management plan for Medical Needs and Social Needs                 Patient has had frequent visits for:  Numerous hospitalizations, noncompliant with medications and HD at times. Financial stressors,             Warnings/Safety Alerts:           Goals/Interventions:      Renal Function, if needs HD call and see if patient can get a slot in a timely manner. Pauline Zendejas - ask for the  if needed. [phone]  Can also call Artcaryl bardales to try to find another slot at another clinic at [phone]  Consult nephrology  If pt presents with CHF, fluid overload, increased BNP, chest pain, shortness of breath consider early dose of IV Lasix and reevaluate in 2 hours. IF CHF, consider dose of IV Lasix 50vnHPo8 dose. and re-eval in 2 hours. Consult CHF nurse, Melyssa Moise, [phone]  Consider [facility] referral vs. home care if home bound. Consult SW  Palliative care consult  Consult  ([facility]) CHF, Palliative to all meet with Cristiano Heath and his wife together with MD. Varun Foster  [address] [phone]. Per CM if patient goes to SNF, he is on waiver and they will pay for transport  Consider Home Care. [facility], [facility]. in past     May give home Percocet dose of 7.5/325mf x1. No IV narcotics, no IV or oral benzodiazepines.         During Business Hours:      After Hours:         Situation: Chronic Conditions Summary:                      Diagnosis Date    Ambulatory dysfunction       walker for long distances, SOB with distance    Aortic stenosis       echo 2017

## 2023-08-21 ENCOUNTER — CARE COORDINATION (OUTPATIENT)
Dept: CASE MANAGEMENT | Age: 53
End: 2023-08-21

## 2023-08-21 ENCOUNTER — CARE COORDINATION (OUTPATIENT)
Dept: CARE COORDINATION | Age: 53
End: 2023-08-21

## 2023-08-21 NOTE — CARE COORDINATION
Contacted [facility] and left a voicemail for Bridger Franklin to [facility] date requesting a call back.

## 2023-08-22 ENCOUNTER — CARE COORDINATION (OUTPATIENT)
Dept: CASE MANAGEMENT | Age: 53
End: 2023-08-22

## 2023-08-22 NOTE — CARE COORDINATION
[address] Discharge Call    2023    Patient: Ezequiel French Patient : 1970   MRN: [id number]  Reason for Admission: ESRD  Discharge Date: 23 RARS: Readmission Risk Score: 48.7       Discharge Facility:  [address] Course:    [facility] ED N/v with a recent 5th toe amputation R.   to  [facility] ESRD   to  [facility]. HFU made:  None  Has in past had HD on MWF. Has a Management Plan. Sig Hx:   ESRD with HD, TAVR hx, CHF, DMII, COPD, afib     DME:      Conversation:   Left HIPPA compliant message regarding the nature of the call and a request for a return call with my contact information        RAMO Tarango, RN [phone] / [facility] Transition Nurse           Follow up plan: Will close as unable to reach and reviewed by Pooja Bailey to close. Care Transitions Post Acute Facility Transition      Do you have all of your prescriptions and are they filled?: Yes         Do you have support at home?: Partner/Spouse/SO      Care Transitions Interventions         No future appointments.     RAMO Tarango, RN   [address] Transition Nurse  [phone]

## 2023-08-28 ENCOUNTER — HOSPITAL ENCOUNTER (INPATIENT)
Age: 53
LOS: 1 days | Discharge: HOME HEALTH | DRG: 189 | End: 2023-08-29
Attending: STUDENT IN AN ORGANIZED HEALTH CARE EDUCATION/TRAINING PROGRAM | Admitting: INTERNAL MEDICINE
Payer: MEDICARE

## 2023-08-28 ENCOUNTER — APPOINTMENT (OUTPATIENT)
Dept: GENERAL RADIOLOGY | Age: 53
DRG: 189 | End: 2023-08-28
Payer: MEDICARE

## 2023-08-28 DIAGNOSIS — E83.42 HYPOMAGNESEMIA: ICD-10-CM

## 2023-08-28 DIAGNOSIS — E83.51 HYPOCALCEMIA: ICD-10-CM

## 2023-08-28 DIAGNOSIS — J96.21 ACUTE ON CHRONIC RESPIRATORY FAILURE WITH HYPOXEMIA (HCC): Primary | ICD-10-CM

## 2023-08-28 DIAGNOSIS — J81.0 ACUTE PULMONARY EDEMA (HCC): ICD-10-CM

## 2023-08-28 DIAGNOSIS — N18.6 END-STAGE RENAL DISEASE NEEDING DIALYSIS (HCC): ICD-10-CM

## 2023-08-28 DIAGNOSIS — Z99.2 END-STAGE RENAL DISEASE NEEDING DIALYSIS (HCC): ICD-10-CM

## 2023-08-28 PROBLEM — J96.01 ACUTE RESPIRATORY FAILURE WITH HYPOXIA (HCC): Status: ACTIVE | Noted: 2023-08-28

## 2023-08-28 LAB
ALBUMIN SERPL-MCNC: 1.9 G/DL (ref 3.4–5)
ALBUMIN/GLOB SERPL: 1 {RATIO} (ref 1.1–2.2)
ALP SERPL-CCNC: 50 U/L (ref 40–129)
ALT SERPL-CCNC: 6 U/L (ref 10–40)
ANION GAP SERPL CALCULATED.3IONS-SCNC: 13 MMOL/L (ref 3–16)
AST SERPL-CCNC: 12 U/L (ref 15–37)
BASE EXCESS BLDV CALC-SCNC: 0.4 MMOL/L (ref -3–3)
BASOPHILS # BLD: 0.1 K/UL (ref 0–0.2)
BASOPHILS NFR BLD: 1 %
BILIRUB SERPL-MCNC: <0.2 MG/DL (ref 0–1)
BUN SERPL-MCNC: 54 MG/DL (ref 7–20)
CA-I BLD-SCNC: 1.05 MMOL/L (ref 1.12–1.32)
CALCIUM SERPL-MCNC: 5 MG/DL (ref 8.3–10.6)
CALCIUM SERPL-MCNC: 9.7 MG/DL (ref 8.3–10.6)
CHLORIDE SERPL-SCNC: 113 MMOL/L (ref 99–110)
CO2 BLDV-SCNC: 28 MMOL/L
CO2 SERPL-SCNC: 14 MMOL/L (ref 21–32)
COHGB MFR BLDV: 3.7 % (ref 0–1.5)
CREAT SERPL-MCNC: 6 MG/DL (ref 0.9–1.3)
DEPRECATED RDW RBC AUTO: 17.5 % (ref 12.4–15.4)
EKG ATRIAL RATE: 100 BPM
EKG DIAGNOSIS: NORMAL
EKG P AXIS: 80 DEGREES
EKG P-R INTERVAL: 168 MS
EKG Q-T INTERVAL: 368 MS
EKG QRS DURATION: 96 MS
EKG QTC CALCULATION (BAZETT): 474 MS
EKG R AXIS: -14 DEGREES
EKG T AXIS: 58 DEGREES
EKG VENTRICULAR RATE: 100 BPM
EOSINOPHIL # BLD: 0.3 K/UL (ref 0–0.6)
EOSINOPHIL NFR BLD: 3.7 %
GFR SERPLBLD CREATININE-BSD FMLA CKD-EPI: 10 ML/MIN/{1.73_M2}
GLUCOSE BLD-MCNC: 224 MG/DL (ref 70–99)
GLUCOSE BLD-MCNC: 298 MG/DL (ref 70–99)
GLUCOSE SERPL-MCNC: 167 MG/DL (ref 70–99)
HCO3 BLDV-SCNC: 26.1 MMOL/L (ref 23–29)
HCT VFR BLD AUTO: 29.9 % (ref 40.5–52.5)
HGB BLD-MCNC: 9.7 G/DL (ref 13.5–17.5)
LYMPHOCYTES # BLD: 0.6 K/UL (ref 1–5.1)
LYMPHOCYTES NFR BLD: 6.5 %
MAGNESIUM SERPL-MCNC: 1.1 MG/DL (ref 1.8–2.4)
MCH RBC QN AUTO: 29.7 PG (ref 26–34)
MCHC RBC AUTO-ENTMCNC: 32.4 G/DL (ref 31–36)
MCV RBC AUTO: 91.6 FL (ref 80–100)
METHGB MFR BLDV: 0.3 %
MONOCYTES # BLD: 0.4 K/UL (ref 0–1.3)
MONOCYTES NFR BLD: 4.9 %
NEUTROPHILS # BLD: 7.2 K/UL (ref 1.7–7.7)
NEUTROPHILS NFR BLD: 83.9 %
NT-PROBNP SERPL-MCNC: ABNORMAL PG/ML (ref 0–124)
O2 THERAPY: ABNORMAL
PCO2 BLDV: 46.7 MMHG (ref 40–50)
PERFORMED ON: ABNORMAL
PERFORMED ON: ABNORMAL
PH BLDV: 7.3 [PH] (ref 7.35–7.45)
PH BLDV: 7.37 [PH] (ref 7.35–7.45)
PLATELET # BLD AUTO: 312 K/UL (ref 135–450)
PMV BLD AUTO: 7.6 FL (ref 5–10.5)
PO2 BLDV: 69.6 MMHG (ref 25–40)
POTASSIUM SERPL-SCNC: 3.4 MMOL/L (ref 3.5–5.1)
POTASSIUM SERPL-SCNC: 5.2 MMOL/L (ref 3.5–5.1)
PROT SERPL-MCNC: 3.9 G/DL (ref 6.4–8.2)
RBC # BLD AUTO: 3.26 M/UL (ref 4.2–5.9)
SAO2 % BLDV: 92 %
SODIUM SERPL-SCNC: 140 MMOL/L (ref 136–145)
TROPONIN, HIGH SENSITIVITY: 136 NG/L (ref 0–22)
TROPONIN, HIGH SENSITIVITY: 93 NG/L (ref 0–22)
WBC # BLD AUTO: 8.6 K/UL (ref 4–11)

## 2023-08-28 PROCEDURE — 2060000000 HC ICU INTERMEDIATE R&B

## 2023-08-28 PROCEDURE — 93010 ELECTROCARDIOGRAM REPORT: CPT | Performed by: INTERNAL MEDICINE

## 2023-08-28 PROCEDURE — 2700000000 HC OXYGEN THERAPY PER DAY

## 2023-08-28 PROCEDURE — 6370000000 HC RX 637 (ALT 250 FOR IP): Performed by: INTERNAL MEDICINE

## 2023-08-28 PROCEDURE — 94761 N-INVAS EAR/PLS OXIMETRY MLT: CPT

## 2023-08-28 PROCEDURE — 90935 HEMODIALYSIS ONE EVALUATION: CPT

## 2023-08-28 PROCEDURE — 96374 THER/PROPH/DIAG INJ IV PUSH: CPT

## 2023-08-28 PROCEDURE — 82330 ASSAY OF CALCIUM: CPT

## 2023-08-28 PROCEDURE — 85025 COMPLETE CBC W/AUTO DIFF WBC: CPT

## 2023-08-28 PROCEDURE — 83036 HEMOGLOBIN GLYCOSYLATED A1C: CPT

## 2023-08-28 PROCEDURE — 5A1D70Z PERFORMANCE OF URINARY FILTRATION, INTERMITTENT, LESS THAN 6 HOURS PER DAY: ICD-10-PCS | Performed by: INTERNAL MEDICINE

## 2023-08-28 PROCEDURE — 99285 EMERGENCY DEPT VISIT HI MDM: CPT

## 2023-08-28 PROCEDURE — 83880 ASSAY OF NATRIURETIC PEPTIDE: CPT

## 2023-08-28 PROCEDURE — 6360000002 HC RX W HCPCS: Performed by: INTERNAL MEDICINE

## 2023-08-28 PROCEDURE — 6360000002 HC RX W HCPCS: Performed by: STUDENT IN AN ORGANIZED HEALTH CARE EDUCATION/TRAINING PROGRAM

## 2023-08-28 PROCEDURE — 82803 BLOOD GASES ANY COMBINATION: CPT

## 2023-08-28 PROCEDURE — 83735 ASSAY OF MAGNESIUM: CPT

## 2023-08-28 PROCEDURE — 99222 1ST HOSP IP/OBS MODERATE 55: CPT | Performed by: INTERNAL MEDICINE

## 2023-08-28 PROCEDURE — 5A09357 ASSISTANCE WITH RESPIRATORY VENTILATION, LESS THAN 24 CONSECUTIVE HOURS, CONTINUOUS POSITIVE AIRWAY PRESSURE: ICD-10-PCS | Performed by: INTERNAL MEDICINE

## 2023-08-28 PROCEDURE — 36415 COLL VENOUS BLD VENIPUNCTURE: CPT

## 2023-08-28 PROCEDURE — 84484 ASSAY OF TROPONIN QUANT: CPT

## 2023-08-28 PROCEDURE — 93005 ELECTROCARDIOGRAM TRACING: CPT | Performed by: STUDENT IN AN ORGANIZED HEALTH CARE EDUCATION/TRAINING PROGRAM

## 2023-08-28 PROCEDURE — 82310 ASSAY OF CALCIUM: CPT

## 2023-08-28 PROCEDURE — 94660 CPAP INITIATION&MGMT: CPT

## 2023-08-28 PROCEDURE — 80053 COMPREHEN METABOLIC PANEL: CPT

## 2023-08-28 PROCEDURE — 71045 X-RAY EXAM CHEST 1 VIEW: CPT

## 2023-08-28 PROCEDURE — 84132 ASSAY OF SERUM POTASSIUM: CPT

## 2023-08-28 RX ORDER — ISOSORBIDE DINITRATE 20 MG/1
20 TABLET ORAL 2 TIMES DAILY
Status: DISCONTINUED | OUTPATIENT
Start: 2023-08-28 | End: 2023-08-29 | Stop reason: HOSPADM

## 2023-08-28 RX ORDER — METOPROLOL SUCCINATE 50 MG/1
100 TABLET, EXTENDED RELEASE ORAL 2 TIMES DAILY
Status: DISCONTINUED | OUTPATIENT
Start: 2023-08-28 | End: 2023-08-29 | Stop reason: HOSPADM

## 2023-08-28 RX ORDER — CALCIUM GLUCONATE 20 MG/ML
2000 INJECTION, SOLUTION INTRAVENOUS ONCE
Status: DISCONTINUED | OUTPATIENT
Start: 2023-08-28 | End: 2023-08-29 | Stop reason: HOSPADM

## 2023-08-28 RX ORDER — ONDANSETRON 2 MG/ML
4 INJECTION INTRAMUSCULAR; INTRAVENOUS EVERY 6 HOURS PRN
Status: DISCONTINUED | OUTPATIENT
Start: 2023-08-28 | End: 2023-08-29 | Stop reason: HOSPADM

## 2023-08-28 RX ORDER — ONDANSETRON 4 MG/1
4 TABLET, ORALLY DISINTEGRATING ORAL EVERY 8 HOURS PRN
Status: DISCONTINUED | OUTPATIENT
Start: 2023-08-28 | End: 2023-08-29 | Stop reason: HOSPADM

## 2023-08-28 RX ORDER — DULOXETIN HYDROCHLORIDE 60 MG/1
60 CAPSULE, DELAYED RELEASE ORAL DAILY
Status: DISCONTINUED | OUTPATIENT
Start: 2023-08-28 | End: 2023-08-29 | Stop reason: HOSPADM

## 2023-08-28 RX ORDER — QUETIAPINE FUMARATE 25 MG/1
50 TABLET, FILM COATED ORAL NIGHTLY
Status: DISCONTINUED | OUTPATIENT
Start: 2023-08-28 | End: 2023-08-29 | Stop reason: HOSPADM

## 2023-08-28 RX ORDER — PRAVASTATIN SODIUM 40 MG
40 TABLET ORAL DAILY
Status: DISCONTINUED | OUTPATIENT
Start: 2023-08-28 | End: 2023-08-29 | Stop reason: HOSPADM

## 2023-08-28 RX ORDER — LORAZEPAM 0.5 MG/1
0.5 TABLET ORAL EVERY 6 HOURS PRN
Status: DISCONTINUED | OUTPATIENT
Start: 2023-08-28 | End: 2023-08-29 | Stop reason: HOSPADM

## 2023-08-28 RX ORDER — TRAZODONE HYDROCHLORIDE 50 MG/1
50 TABLET ORAL NIGHTLY
Status: DISCONTINUED | OUTPATIENT
Start: 2023-08-28 | End: 2023-08-29 | Stop reason: HOSPADM

## 2023-08-28 RX ORDER — FUROSEMIDE 10 MG/ML
100 INJECTION INTRAMUSCULAR; INTRAVENOUS ONCE
Status: COMPLETED | OUTPATIENT
Start: 2023-08-28 | End: 2023-08-28

## 2023-08-28 RX ORDER — MAGNESIUM SULFATE IN WATER 40 MG/ML
2000 INJECTION, SOLUTION INTRAVENOUS ONCE
Status: COMPLETED | OUTPATIENT
Start: 2023-08-28 | End: 2023-08-28

## 2023-08-28 RX ORDER — POLYETHYLENE GLYCOL 3350 17 G/17G
17 POWDER, FOR SOLUTION ORAL DAILY PRN
Status: DISCONTINUED | OUTPATIENT
Start: 2023-08-28 | End: 2023-08-29 | Stop reason: HOSPADM

## 2023-08-28 RX ORDER — SODIUM CHLORIDE 0.9 % (FLUSH) 0.9 %
5-40 SYRINGE (ML) INJECTION EVERY 12 HOURS SCHEDULED
Status: DISCONTINUED | OUTPATIENT
Start: 2023-08-28 | End: 2023-08-29 | Stop reason: HOSPADM

## 2023-08-28 RX ORDER — ACETAMINOPHEN 650 MG/1
650 SUPPOSITORY RECTAL EVERY 6 HOURS PRN
Status: DISCONTINUED | OUTPATIENT
Start: 2023-08-28 | End: 2023-08-29 | Stop reason: HOSPADM

## 2023-08-28 RX ORDER — PANTOPRAZOLE SODIUM 40 MG/1
40 TABLET, DELAYED RELEASE ORAL
Status: DISCONTINUED | OUTPATIENT
Start: 2023-08-28 | End: 2023-08-29 | Stop reason: HOSPADM

## 2023-08-28 RX ORDER — OXYCODONE AND ACETAMINOPHEN 7.5; 325 MG/1; MG/1
1 TABLET ORAL EVERY 6 HOURS PRN
Status: DISCONTINUED | OUTPATIENT
Start: 2023-08-28 | End: 2023-08-29 | Stop reason: HOSPADM

## 2023-08-28 RX ORDER — DEXTROSE MONOHYDRATE 100 MG/ML
INJECTION, SOLUTION INTRAVENOUS CONTINUOUS PRN
Status: DISCONTINUED | OUTPATIENT
Start: 2023-08-28 | End: 2023-08-29 | Stop reason: HOSPADM

## 2023-08-28 RX ORDER — CLOPIDOGREL BISULFATE 75 MG/1
75 TABLET ORAL DAILY
Status: DISCONTINUED | OUTPATIENT
Start: 2023-08-28 | End: 2023-08-29 | Stop reason: HOSPADM

## 2023-08-28 RX ORDER — ACETAMINOPHEN 325 MG/1
650 TABLET ORAL EVERY 6 HOURS PRN
Status: DISCONTINUED | OUTPATIENT
Start: 2023-08-28 | End: 2023-08-29 | Stop reason: HOSPADM

## 2023-08-28 RX ORDER — INSULIN LISPRO 100 [IU]/ML
0-4 INJECTION, SOLUTION INTRAVENOUS; SUBCUTANEOUS NIGHTLY
Status: DISCONTINUED | OUTPATIENT
Start: 2023-08-28 | End: 2023-08-29 | Stop reason: HOSPADM

## 2023-08-28 RX ORDER — SODIUM CHLORIDE 0.9 % (FLUSH) 0.9 %
5-40 SYRINGE (ML) INJECTION PRN
Status: DISCONTINUED | OUTPATIENT
Start: 2023-08-28 | End: 2023-08-29 | Stop reason: HOSPADM

## 2023-08-28 RX ORDER — SODIUM CHLORIDE 9 MG/ML
INJECTION, SOLUTION INTRAVENOUS PRN
Status: DISCONTINUED | OUTPATIENT
Start: 2023-08-28 | End: 2023-08-29 | Stop reason: HOSPADM

## 2023-08-28 RX ORDER — INSULIN LISPRO 100 [IU]/ML
0-4 INJECTION, SOLUTION INTRAVENOUS; SUBCUTANEOUS
Status: DISCONTINUED | OUTPATIENT
Start: 2023-08-28 | End: 2023-08-29 | Stop reason: HOSPADM

## 2023-08-28 RX ADMIN — PRAVASTATIN SODIUM 40 MG: 40 TABLET ORAL at 15:04

## 2023-08-28 RX ADMIN — OXYCODONE AND ACETAMINOPHEN 1 TABLET: 7.5; 325 TABLET ORAL at 20:13

## 2023-08-28 RX ADMIN — DULOXETINE HYDROCHLORIDE 60 MG: 60 CAPSULE, DELAYED RELEASE ORAL at 15:04

## 2023-08-28 RX ADMIN — ISOSORBIDE DINITRATE 20 MG: 20 TABLET ORAL at 15:04

## 2023-08-28 RX ADMIN — ISOSORBIDE DINITRATE 20 MG: 20 TABLET ORAL at 20:21

## 2023-08-28 RX ADMIN — APIXABAN 5 MG: 5 TABLET, FILM COATED ORAL at 20:22

## 2023-08-28 RX ADMIN — METOPROLOL SUCCINATE 100 MG: 50 TABLET, EXTENDED RELEASE ORAL at 20:21

## 2023-08-28 RX ADMIN — FUROSEMIDE 100 MG: 10 INJECTION, SOLUTION INTRAMUSCULAR; INTRAVENOUS at 05:27

## 2023-08-28 RX ADMIN — OXYCODONE AND ACETAMINOPHEN 1 TABLET: 7.5; 325 TABLET ORAL at 09:01

## 2023-08-28 RX ADMIN — MAGNESIUM SULFATE HEPTAHYDRATE 2000 MG: 40 INJECTION, SOLUTION INTRAVENOUS at 07:25

## 2023-08-28 RX ADMIN — APIXABAN 5 MG: 5 TABLET, FILM COATED ORAL at 15:03

## 2023-08-28 RX ADMIN — CLOPIDOGREL BISULFATE 75 MG: 75 TABLET ORAL at 15:03

## 2023-08-28 RX ADMIN — QUETIAPINE FUMARATE 50 MG: 25 TABLET ORAL at 20:22

## 2023-08-28 RX ADMIN — OXYCODONE AND ACETAMINOPHEN 1 TABLET: 7.5; 325 TABLET ORAL at 15:04

## 2023-08-28 RX ADMIN — TRAZODONE HYDROCHLORIDE 50 MG: 50 TABLET ORAL at 20:22

## 2023-08-28 RX ADMIN — EPOETIN ALFA-EPBX 5000 UNITS: 10000 INJECTION, SOLUTION INTRAVENOUS; SUBCUTANEOUS at 13:33

## 2023-08-28 RX ADMIN — METOPROLOL SUCCINATE 100 MG: 50 TABLET, EXTENDED RELEASE ORAL at 15:04

## 2023-08-28 RX ADMIN — POTASSIUM BICARBONATE 20 MEQ: 782 TABLET, EFFERVESCENT ORAL at 15:04

## 2023-08-28 ASSESSMENT — PAIN SCALES - GENERAL
PAINLEVEL_OUTOF10: 8
PAINLEVEL_OUTOF10: 8

## 2023-08-28 ASSESSMENT — PAIN - FUNCTIONAL ASSESSMENT: PAIN_FUNCTIONAL_ASSESSMENT: ACTIVITIES ARE NOT PREVENTED

## 2023-08-28 ASSESSMENT — PAIN DESCRIPTION - FREQUENCY: FREQUENCY: CONTINUOUS

## 2023-08-28 ASSESSMENT — PAIN DESCRIPTION - ONSET: ONSET: ON-GOING

## 2023-08-28 ASSESSMENT — PAIN DESCRIPTION - LOCATION: LOCATION: BACK

## 2023-08-28 ASSESSMENT — PAIN DESCRIPTION - DESCRIPTORS: DESCRIPTORS: ACHING

## 2023-08-28 ASSESSMENT — PAIN DESCRIPTION - PAIN TYPE: TYPE: CHRONIC PAIN

## 2023-08-28 ASSESSMENT — PAIN DESCRIPTION - ORIENTATION: ORIENTATION: LOWER;MID

## 2023-08-28 NOTE — CONSULTS
8/28/23 @ [phone] notified Pulm of consult.  Gwen Castillo
[employer]  Nephrology Consult Note           Reason for Consult:  ESRD  Requesting Physician:  Dr. Adalberto Zendejas    Chief Complaint:    Chief Complaint   Patient presents with    Shortness of Breath     0330 woke up w/ SOB. Diminished. 80% 4L home dose. Placed on CPAP, 96%. History of Present Illness on 8/28/23:    52 y.o. yo male with PMH of ESKD on HD MWF, CAD s/p stents, PAD, DM2, COPD, sCHF on home oxygen 4 L NC admitted for resp failure  He only had two HD session the past week dt to schedule change thus running on 8/22 n 8/25  He started being sob this am abruptly and EMS was called.  His o2 sats were in the 80% at 4l and was placed on CPAP w improvement    Past Medical History:        Diagnosis Date    Ambulatory dysfunction     walker for long distances, SOB with distance    Aortic stenosis     echo 2017    Arthritis     hands and hips    Asthma     Bilateral hilar adenopathy syndrome 6/3/2013    CAD (coronary artery disease)     Dr. Srinath Lovelace [facility]) 04/19/2019    EF= 43%    CHF (congestive heart failure) ([address])     Chronic pain     COPD (chronic obstructive pulmonary disease) ([address])     pulmonology Dr. Crowe Killer    Depression     Diabetes mellitus ([address])     borderline    Difficult intravenous access     Emphysema of lung ([address])     ESRD (end stage renal disease) on dialysis ([address])     MWF    Fear of needles     Gastric ulcer     GERD (gastroesophageal reflux disease)     Heart valve problem     bicuspic valve    Hemodialysis patient ([address])     History of spinal fracture     work incident    Hx of blood clots     Bilateral lower extremities; stents in place    Hyperlipidemia     Hypertension     MI (myocardial infarction) ([address]) 2019    has had 9 MIs. 2019 was the last    Neuromuscular disorder ([address])     due to CVA    Numbness and tingling in left arm     from fistula    Pneumonia     PONV (postoperative nausea and vomiting)     Prolonged emergence from general anesthesia     States
disease) on dialysis [facility])     MWF    Fear of needles     Gastric ulcer     GERD (gastroesophageal reflux disease)     Heart valve problem     bicuspic valve    Hemodialysis patient ([address])     History of spinal fracture     work incident    Hx of blood clots     Bilateral lower extremities; stents in place    Hyperlipidemia     Hypertension     MI (myocardial infarction) ([address]) 2019    has had 9 MIs. 2019 was the last    Neuromuscular disorder ([address])     due to CVA    Numbness and tingling in left arm     from fistula    Pneumonia     PONV (postoperative nausea and vomiting)     Prolonged emergence from general anesthesia     States requires more medication than most people    Sleep apnea     Uses CPAP    Stroke ([address])     7mm thalamic cva 2017 deficts left side, left side weakness    TIA (transient ischemic attack)     Unspecified diseases of blood and blood-forming organs         Past Surgical History:   Procedure Laterality Date    AORTIC VALVE REPLACEMENT N/A 10/15/2019    TRANSCATHETER AORTIC VALVE REPLACEMENT FEMORAL APPROACH performed by Radha Garner MD at [address] [facility] Right 7/2/2019    PERITONEAL DIALYSIS CATHETER REMOVAL performed by Mel Sanon MD at [address] [place] [address]  2/29/2015    WN    CORONARY ANGIOPLASTY WITH STENT PLACEMENT  05/26/15    CYST REMOVAL  08/14/2013    EXCISION CYSTS, NECK X2 AND ABDOMINAL benign    DIAGNOSTIC CARDIAC CATH LAB PROCEDURE      DIALYSIS FISTULA CREATION Left 10/30/2017    LEFT BRACHIAL CEPHALIC FISTULA    DIALYSIS FISTULA CREATION Left 3/27/2019    LIGATION  AV FISTULA performed by Jesse Velez MD at [address] [facility], [address] Right 5/26/2023    INCISION AND DEBRIDEMENT RIGHT FOOT WITH performed by Maureen Cardoza DPM at [address] [facility] [address] Right 5/26/2023    RIGHT FIFTH RAY AMPUTATION performed by Maureen Cardoza DPM at [address] [facility]

## 2023-08-28 NOTE — CARE COORDINATION
Case Management Assessment  Initial Evaluation    Date/Time of Evaluation: 8/28/2023 10:44 AM  Assessment Completed by: Tequila Gallagher RN    If patient is discharged prior to next notation, then this note serves as note for discharge by case management. Patient Name: Jesus Lock                   YOB: 1970  Diagnosis: Hypocalcemia [E83.51]  Hypomagnesemia [E83.42]  Acute pulmonary edema ([address]) [J81.0]  End-stage renal disease needing dialysis ([address]) [N18.6, Z99.2]  Acute respiratory failure with hypoxia ([address]) [J96.01]  Acute on chronic respiratory failure with hypoxemia ([address]) [J96.21]                   Date / Time: 8/28/2023  4:46 AM    Patient Admission Status: Inpatient   Readmission Risk (Low < 19, Mod (19-27), High > 27): Readmission Risk Score: 38.9    Current PCP: Vincent Cason MD  PCP verified by CM? Yes    Chart Reviewed: Yes      History Provided by: Patient, Spouse  Patient Orientation: Alert and Oriented, Person, Place, Situation, Self    Patient Cognition: Alert    Hospitalization in the last 30 days (Readmission):  No    If yes, Readmission Assessment in CM Navigator will be completed.     Advance Directives:      Code Status: Full Code   Patient's Primary Decision Maker is: Named in Scanned ACP Document    Primary Decision Maker: Anisa - Spouse - [phone]    Secondary Decision Maker: Julieta Bumpers - Brother/Sister - [phone]    Discharge Planning:    Patient lives with: Spouse/Significant Other Type of Home: Trailer/Mobile Home  Primary Care Giver: Spouse  Patient Support Systems include: Spouse/Significant Other, Family Members   Current Financial resources: Medicaid, Medicare  Current community resources: None  Current services prior to admission: C-pap, Oxygen Therapy, Durable Medical Equipment            Current DME: Matias Jose            Type of Home Care services:  None    ADLS  Prior functional level: Assistance with the following:, Bathing,

## 2023-08-28 NOTE — PROGRESS NOTES
Patient placed on non-rebreather in dialysis. SaO2 96% on 6L when RT arrived. Patient asking for more oxygen and anxiety medicine. Dialysis nurse made primary RN aware. Patient SaO2 100% on non-rebreather.

## 2023-08-28 NOTE — PROGRESS NOTES
08/28/23 0448   NIV Type   NIV Started/Stopped On   Equipment Type v60   Mode Bilevel   Mask Type Full face mask   Mask Size Medium   Assessment   Respirations 29   SpO2 98 %   Comfort Level Good   Using Accessory Muscles Yes   Mask Compliance Good   Skin Assessment Clean, dry, & intact   Skin Protection for O2 Device Yes   Orientation Middle   Location Nose   Settings/Measurements   IPAP 16 cmH20   CPAP/EPAP 8 cmH2O   Vt (Measured) 494 mL   Rate Ordered 8   FiO2  60 %   Minute Volume (L/min) 10.1 Liters   Mask Leak (lpm) 31 lpm   Patient's Home Machine No   Alarm Settings   Alarms On Y   Low Pressure (cmH2O) 6 cmH2O   High Pressure (cmH2O) 30 cmH2O   Delay Alarm 20 sec(s)   RR Low (bpm) 6   RR High (bpm) 40 br/min Klisyri Counseling:  I discussed with the patient the risks of Klisyri including but not limited to erythema, scaling, itching, weeping, crusting, and pain.

## 2023-08-28 NOTE — ED NOTES
@1765 called Nephrology for consult per Dr. Grimm Rosario failure on bipap, pulm edema, ESRD on HD, need [facility]  @5950 second page  @4767 Nephrology Dr. Feroz Calderon called back and spoke to Dr. Ji [facility]  08/28/23 8016

## 2023-08-28 NOTE — ED NOTES
Per MD, Pt removed from bipap and placed on 4L NC. Pt currently sat 96%. Will continue to monitor.       Yisel Davidson RN  08/28/23 0050

## 2023-08-28 NOTE — DIALYSIS
Treatment time: 3.5 Hours  Net UF: 4000 ML    Pre weight: 105.2 Kg  Post weight: 101.2 kg  EDW: 97.5 kg    Access used: LCW TDC   Access function: Good with  ml/min    Medications or blood products given: epo 5000 units    Regular outpatient schedule: MWF [facility]    Summary of response to treatment: Poor, pt very anxious, in pain and complained he could not breathe and needed bipap on although 02 sats were mostly in 90s, down to mid 80s with talking or any movement. NRB mask placed by respiratory and pt then calmed down and finished tx without difficulty until last 6 minutes reported cramping. Crit Line: HCT #1 33.9 minus HCT#2 33.9 equals 0, NO refill present, ending profile A    Copy of dialysis treatment record placed in chart, to be scanned into EMR. Report given to  [facility] RN.

## 2023-08-28 NOTE — ED PROVIDER NOTES
[address] [place] [address]  ED  EMERGENCY DEPARTMENT ENCOUNTER        Pt Name: Ludy Huynh  MRN: [id number]  [address] [facility] [place] 1970  Date of evaluation: 8/28/2023  Provider: Jimmy Powers MD  PCP: Irma Cardoso MD  Note Started: 7:24 AM EDT 8/28/23    CHIEF COMPLAINT       Chief Complaint   Patient presents with    Shortness of Breath     0330 woke up w/ SOB. Diminished. 80% 4L home dose. Placed on CPAP, 96%. Shortness of breath, respiratory failure    HISTORY OF PRESENT ILLNESS: 1 or more Elements     History from : Patient    Limitations to history : None    Ludy Huynh is a 52 y.o. male who presents respiratory failure of abrupt onset at approximately 3 to 3:30 in the morning. On EMS arrival to scene, patient dyspneic, using 4 L nasal cannula, unable to hook up his home CPAP/BiPAP as he does not have all of the pieces to make it work at home. Improved on EMS CPAP in route. Was 80% saturation on his 4 L nasal cannula. Associated with chest tightness, no fevers. Nursing Notes were all reviewed and agreed with or any disagreements were addressed in the HPI. REVIEW OF SYSTEMS :      Positives and Pertinent negatives as per HPI. ROS otherwise unremarkable.     SURGICAL HISTORY     Past Surgical History:   Procedure Laterality Date    AORTIC VALVE REPLACEMENT N/A 10/15/2019    TRANSCATHETER AORTIC VALVE REPLACEMENT FEMORAL APPROACH performed by Belkis Conley MD at [address] [place] [address] Right 7/2/2019    PERITONEAL DIALYSIS CATHETER REMOVAL performed by Gabby Martins MD at [address] [place] [address]  2/29/2015    WNL    CORONARY ANGIOPLASTY WITH STENT PLACEMENT  05/26/15    CYST REMOVAL  08/14/2013    EXCISION CYSTS, NECK X2 AND ABDOMINAL benign    DIAGNOSTIC CARDIAC CATH LAB PROCEDURE      DIALYSIS FISTULA CREATION Left 10/30/2017    LEFT BRACHIAL CEPHALIC FISTULA    DIALYSIS FISTULA CREATION Left 3/27/2019    LIGATION  AV FISTULA performed by Chandu Sevilla

## 2023-08-29 VITALS
BODY MASS INDEX: 32 KG/M2 | WEIGHT: 216.05 LBS | SYSTOLIC BLOOD PRESSURE: 123 MMHG | OXYGEN SATURATION: 94 % | HEIGHT: 69 IN | RESPIRATION RATE: 18 BRPM | DIASTOLIC BLOOD PRESSURE: 55 MMHG | TEMPERATURE: 98.3 F | HEART RATE: 60 BPM

## 2023-08-29 LAB
EST. AVERAGE GLUCOSE BLD GHB EST-MCNC: 177.2 MG/DL
GLUCOSE BLD-MCNC: 204 MG/DL (ref 70–99)
HBA1C MFR BLD: 7.8 %
PERFORMED ON: ABNORMAL

## 2023-08-29 PROCEDURE — 94761 N-INVAS EAR/PLS OXIMETRY MLT: CPT

## 2023-08-29 PROCEDURE — 80048 BASIC METABOLIC PNL TOTAL CA: CPT

## 2023-08-29 PROCEDURE — 6360000002 HC RX W HCPCS

## 2023-08-29 PROCEDURE — 2700000000 HC OXYGEN THERAPY PER DAY

## 2023-08-29 PROCEDURE — 6370000000 HC RX 637 (ALT 250 FOR IP): Performed by: INTERNAL MEDICINE

## 2023-08-29 RX ORDER — HEPARIN SODIUM 1000 [USP'U]/ML
INJECTION, SOLUTION INTRAVENOUS; SUBCUTANEOUS
Status: COMPLETED
Start: 2023-08-29 | End: 2023-08-29

## 2023-08-29 RX ADMIN — HEPARIN SODIUM: 1000 INJECTION, SOLUTION INTRAVENOUS; SUBCUTANEOUS at 13:47

## 2023-08-29 RX ADMIN — APIXABAN 5 MG: 5 TABLET, FILM COATED ORAL at 08:40

## 2023-08-29 RX ADMIN — PANTOPRAZOLE SODIUM 40 MG: 40 TABLET, DELAYED RELEASE ORAL at 05:22

## 2023-08-29 RX ADMIN — PRAVASTATIN SODIUM 40 MG: 40 TABLET ORAL at 08:39

## 2023-08-29 RX ADMIN — METOPROLOL SUCCINATE 100 MG: 50 TABLET, EXTENDED RELEASE ORAL at 08:39

## 2023-08-29 RX ADMIN — DULOXETINE HYDROCHLORIDE 60 MG: 60 CAPSULE, DELAYED RELEASE ORAL at 08:40

## 2023-08-29 RX ADMIN — OXYCODONE AND ACETAMINOPHEN 1 TABLET: 7.5; 325 TABLET ORAL at 05:22

## 2023-08-29 RX ADMIN — ISOSORBIDE DINITRATE 20 MG: 20 TABLET ORAL at 08:39

## 2023-08-29 RX ADMIN — CLOPIDOGREL BISULFATE 75 MG: 75 TABLET ORAL at 08:39

## 2023-08-29 ASSESSMENT — PAIN SCALES - GENERAL: PAINLEVEL_OUTOF10: 5

## 2023-08-29 ASSESSMENT — PAIN DESCRIPTION - LOCATION: LOCATION: BACK

## 2023-08-29 ASSESSMENT — PAIN DESCRIPTION - DESCRIPTORS: DESCRIPTORS: ACHING

## 2023-08-29 ASSESSMENT — PAIN DESCRIPTION - ORIENTATION: ORIENTATION: LOWER;MID

## 2023-08-29 NOTE — PROGRESS NOTES
Pt found in bathroom by himself at shift change during bedside report. Both RNs assisted pt back to bed and reeducated pt on necessity of safe ambulation, using the call light, asking for assistance, and the use of the bed alarm. Pt became agitated and said, \"turn that damn alarm off, i'm just going to keep setting that shit off\". This RN had a discussion with the pt regarding his safety and compliance with the bed alarm and getting up on his own. Pt still refusing to comply with safe ambulation practices and demands the bed alarm be turned off. RN discussed with charge RN. Pt said he is willing to sign a bed alarm refusal form, but we do not have those at this facility. Patient is A/Ox4, so the bed alarm currently off with the room door open. RN will attempt to round more frequently. Pt is also severely noncompliant with diet restrictions. Pt has snacks and pop from home at bedside. RN attempted to discuss this with pt and provide water to take with his meds instead of the pop. Pt claims, \"I hate water\", RN explained to patient that this is contributing to his hyperglycemia and residual comorbidities. Pt states he has heard this all before and doesn't care.

## 2023-08-29 NOTE — PLAN OF CARE
Problem: Discharge Planning  Goal: Discharge to home or other facility with appropriate resources  Outcome: Progressing     Problem: Pain  Goal: Verbalizes/displays adequate comfort level or baseline comfort level  Outcome: Progressing     Problem: Chronic Conditions and Co-morbidities  Goal: Patient's chronic conditions and co-morbidity symptoms are monitored and maintained or improved  8/29/2023 1034 by Zuly Maurer RN  Outcome: Progressing  8/28/2023 2224 by Mariam Morris RN  Outcome: Not Progressing     Problem: ABCDS Injury Assessment  Goal: Absence of physical injury  Outcome: Progressing     Problem: Chronic Conditions and Co-morbidities  Goal: Patient's chronic conditions and co-morbidity symptoms are monitored and maintained or improved  8/29/2023 1034 by Zuly Maurer RN  Outcome: Progressing  8/28/2023 2224 by Mariam Morris RN  Outcome: Not Progressing     Problem: Safety - Adult  Goal: Free from fall injury  8/29/2023 1034 by Zuly Maurer RN  Outcome: Progressing  8/28/2023 2224 by Mariam Morris RN  Outcome: Not Progressing

## 2023-08-29 NOTE — PLAN OF CARE
Addended by: Cruz Ruiz on: 9/16/2020 04:48 PM     Modules accepted: Orders Problem: Chronic Conditions and Co-morbidities  Goal: Patient's chronic conditions and co-morbidity symptoms are monitored and maintained or improved  Outcome: Not Progressing     Problem: Safety - Adult  Goal: Free from fall injury  Outcome: Not Progressing    Pt refusing or ignoring all education. Pt continues to get up without calling for assistance, refuses bed alarm, and ignores dietary restrictions.

## 2023-08-29 NOTE — PROGRESS NOTES
Treatment time: 3 hours  Net UF: 1000 ml     Pre weight: 99 kg  Post weight:98 kg      Access used: l tunned cvc    Access function: good with  ml/mi     Summary of response to treatment: Patient tolerated treatment fair and without any complications except 2 episodes of hypotension with no need for bolus. Patient remained stable throughout entire treatment   Report given to [place], [place] and copy of dialysis treatment record placed in chart, to be scanned into EMR.

## 2023-08-29 NOTE — PROGRESS NOTES
During bedside shift handoff, the pt requested a dose of percocet. This RN witnessed the dayshift RN leave the room, return from the med room, scan the percocet into the [facility], and administer the percocet to the pt. After the dayshift RN left the room, the pt stated, \"don't forget to give me my percocet! \", to which this RN replied that she had just witnessed the interaction previously described. The pt continued to deny that he had received the dose until this RN showed the pt the scanned in documentation in the [facility]. The pt then asked, \"huh, did I really just get it?\", to which this RN replied, \"yes, you just got it 4 minutes ago\". Pt currently not demanding anymore pain medication.

## 2023-08-29 NOTE — CARE COORDINATION
CASE MANAGEMENT DISCHARGE SUMMARY      Discharge to: home w/spouse and [facility]. IMM given: 8/28/23    New Durable Medical Equipment ordered/agency: NONE    Transportation:    Family/car:private w/spouse    Confirmed discharge plan with:     Patient: yes. Family:  pt contacted wife. Spouse brining O2. Facility/Agency, name:  [facility] pulled     RN, name: Kory Lazar    Note: Discharging nurse to complete [facility], reconcile [facility], and place final copy with patient's discharge packet.

## 2023-08-29 NOTE — CARE COORDINATION
Chart reviewed day 1. Pt is followed by IM,Nephro and Pulm. Awaiting family to bring in pts pap machine, Pulm will assess and set up as needed. Pt is missing pieces of his pap, will contact [employer] for further guidance on obtaining parts vs new machine. Emergent dialysis completed yesterday, pt tolerated well per note. Pt assisted w/all ADLs at home w/wife. Pt ambulates/transfers w/walker or cane. Recent d/c from [facility]. Pt active w/[facility]. Chronic O2 use, provided by [employer]. Pierre Aguilar provides services MWF. Will follow for needs as they arise. Electronically signed by Lamberto Barrow RN on 8/29/2023 at 9:42 AM    Per Juan Pablo pt will need to call office himself to set up arrangements for his Cpap. Will inform pt and wife.  Electronically signed by Lamberto Barrow RN on 8/29/2023 at 12:00 PM

## 2023-08-29 NOTE — DISCHARGE INSTR - COC
Continuity of Care Form    Patient Name: Kati Rodrigues   :  1970  MRN:  [id number]    Admit date:  2023  Discharge date:  2023    Code Status Order: Full Code   Advance Directives:     Admitting Physician:  Tori Hollingsworth MD  PCP: Lola Funez MD    Discharging Nurse: González Zapata [facility] Unit/Room#: 4266/9570-95  Discharging Unit Phone Number: [phone]    Emergency Contact:   Extended Emergency Contact Information  Primary Emergency Contact: [facility]  Address: [address], [address] [facility] [place] [address] [address] Phone: [phone]  Mobile Phone: [phone]  Relation: Spouse  Secondary Emergency Contact: ALICIA HARO JR. [facility] Phone: [phone]  Mobile Phone: [phone]  Relation: Brother/Sister  Preferred language: English   needed?  No    Past Surgical History:  Past Surgical History:   Procedure Laterality Date    AORTIC VALVE REPLACEMENT N/A 10/15/2019    TRANSCATHETER AORTIC VALVE REPLACEMENT FEMORAL APPROACH performed by Luna Vanegas MD at [address] Right 2019    PERITONEAL DIALYSIS CATHETER REMOVAL performed by David Quevedo MD at [address] [place] [address]      [facility]    CORONARY ANGIOPLASTY WITH STENT PLACEMENT  05/26/15    CYST REMOVAL  2013    EXCISION CYSTS, NECK X2 AND ABDOMINAL benign    DIAGNOSTIC CARDIAC CATH LAB PROCEDURE      DIALYSIS FISTULA CREATION Left 10/30/2017    LEFT BRACHIAL CEPHALIC FISTULA    DIALYSIS FISTULA CREATION Left 3/27/2019    LIGATION  AV FISTULA [facility] [address] Right 2023    INCISION AND DEBRIDEMENT RIGHT FOOT WITH performed by Kei Cabrera DPM [facility] [address] [facility]    RIGHT [address] AMPUTATION performed by Kei Cabrera DPM at [address] [facility]    IR TUNNELED CATHETER PLACEMENT GREATER THAN 5

## 2023-08-29 NOTE — PLAN OF CARE
Problem: Discharge Planning  Goal: Discharge to home or other facility with appropriate resources  8/29/2023 1500 by Severino Lainez RN  Outcome: Completed  8/29/2023 1034 by Severino Lainez RN  Outcome: Progressing     Problem: Pain  Goal: Verbalizes/displays adequate comfort level or baseline comfort level  8/29/2023 1500 by Severino Lainez RN  Outcome: Completed  8/29/2023 1034 by Severino Lainez RN  Outcome: Progressing     Problem: Chronic Conditions and Co-morbidities  Goal: Patient's chronic conditions and co-morbidity symptoms are monitored and maintained or improved  8/29/2023 1500 by Severino Lainez RN  Outcome: Completed  8/29/2023 1034 by Severino Lainez RN  Outcome: Progressing     Problem: Safety - Adult  Goal: Free from fall injury  8/29/2023 1500 by Severino Lainez RN  Outcome: Completed  8/29/2023 1034 by Severino Lainez RN  Outcome: Progressing     Problem: ABCDS Injury Assessment  Goal: Absence of physical injury  8/29/2023 1500 by Severino Lainez RN  Outcome: Completed  8/29/2023 1034 by Severino Lainez RN  Outcome: Progressing

## 2023-08-29 NOTE — DISCHARGE SUMMARY
by mouth 2 times daily, Disp-60 tablet, R-0Print      traZODone (DESYREL) 50 MG tablet Take 1 tablet by mouth dailyHistorical Med      gabapentin (NEURONTIN) 100 MG capsule Take 1 capsule by mouth 3 times daily for 30 days. , Disp-90 capsule, R-3Print      midodrine (PROAMATINE) 10 MG tablet Take 1 tablet by mouth as needed (Give in dialysis now and prn for SBP <110.), Disp-90 tablet, R-0Print      DULoxetine (CYMBALTA) 60 MG extended release capsule Take 1 capsule by mouth daily, Disp-30 capsule, R-3Normal      lactobacillus (CULTURELLE) capsule Take 1 capsule by mouth daily (with breakfast), Disp-7 capsule, R-0Normal      torsemide (DEMADEX) 100 MG tablet Historical Med      pravastatin (PRAVACHOL) 40 MG tablet TAKE 1 TABLET BY MOUTH DAILY, Disp-90 tablet, R-2Normal      clopidogrel (PLAVIX) 75 MG tablet TAKE 1 TABLET BY MOUTH DAILY, Disp-90 tablet, R-2Normal      docusate sodium (DOK) 100 MG capsule Take 1 capsule by mouth as needed for [facility]ipationDC to [facility]      sennosides-docusate sodium (SENOKOT-S) 8.6-50 MG tablet Take 1 tablet by mouth as needed for ConstipationDC to [facility]      metoprolol succinate (TOPROL XL) 100 MG extended release tablet Take 1 tablet by mouth in the morning and at bedtime, Disp-30 tablet, R-3DC to [facility]      QUEtiapine (SEROQUEL) 50 MG tablet TAKE 1 TABLET BY MOUTH EVERY EVENING, Disp-30 tablet, R-10Normal      isosorbide dinitrate (ISORDIL) 20 MG tablet Take 1 tablet by mouth 3 times daily, Disp-90 tablet, R-3Normal      pantoprazole (PROTONIX) 40 MG tablet TAKE 1 TABLET BY MOUTH EVERY MORNING BEFORE BREAKFAST, Disp-30 tablet, R-10Normal      B Complex-C-Folic Acid (VIRT-CAPS) 1 MG CAPS TAKE 1 CAPSULE BY MOUTH EVERY DAY, Disp-90 capsule, R-1Normal      Calcium Acetate, Phos Binder, 667 MG CAPS TAKE 1 CAPSULE BY MOUTH THREE TIMES DAILY WITH MEALS, Disp-90 capsule, R-3Normal      nitroGLYCERIN (NITROSTAT) 0.4 MG SL tablet DISSOLVE 1 TABLET UNDER THE TONGUE AS NEEDED FOR CHEST PAIN EVERY 5

## 2023-08-30 ENCOUNTER — CARE COORDINATION (OUTPATIENT)
Dept: CASE MANAGEMENT | Age: 53
End: 2023-08-30

## 2023-08-30 NOTE — CARE COORDINATION
[facility] Care Transitions Initial Follow Up Call    Call within 2 business days of discharge: Yes    Patient: Cheryle Coffin Patient : 1970   MRN: [id number]  Reason for Admission: acute on chronic resp fx with hypoxemia  Discharge Date: 23 RARS: Readmission Risk Score: 40.1      Last Discharge [address] [facility]       Date Complaint Diagnosis Description Type Department Provider    23 Shortness of Breath Acute on chronic respiratory failure with hypoxemia ([address]) . .. ED to Hosp-Admission (Discharged) (ADMITTED) Celeste Mckeon MD; Marie Wan. .. Left message with Duana Schlatter at [facility] [address] [place] [address] in regards to [facility] orders  Attempted to reach patient via phone for initial post hospital transition call. VM left stating purpose of call along with my contact information requesting a return call. Plan for next call: confirm PCP is medical house call provider, Gordon Quinones. Care Transitions 24 Hour Call    Do you have all of your prescriptions and are they filled?: Yes  Do you have support at home?: Partner/Spouse/SO  Are you an active caregiver in your home?: No  Care Transitions Interventions       Follow Up  No future appointments.     Darnell Arceo RN

## 2023-08-31 ENCOUNTER — CARE COORDINATION (OUTPATIENT)
Dept: CASE MANAGEMENT | Age: 53
End: 2023-08-31

## 2023-08-31 NOTE — CARE COORDINATION
[facility] Care Transitions Initial Follow Up Call    Call within 2 business days of discharge: Yes    Patient: Julienne Quispe Patient : 1970   MRN: [id number]  Reason for Admission: acute on chronic respiratory failure with hypoxemia  Discharge Date: 23 RARS: Readmission Risk Score: 40.1      Last Discharge [address] [facility] [facility]       Date Complaint Diagnosis Description Type Department Provider    23 Shortness of Breath Acute on chronic respiratory failure with hypoxemia ([address] ) . .. ED to Hosp-Admission (Discharged) (ADMITTED) Ronnie Chung MD; Dari Quinones. .. Second and final attempt made to reach patient for initial post hospital transition call. VM left stating purpose of call along with my contact information requesting a return call. Care Transitions 24 Hour Call    Do you have all of your prescriptions and are they filled?: Yes  Do you have support at home?: Partner/Spouse/SO  Are you an active caregiver in your home?: No  Care Transitions Interventions           Follow Up  No future appointments.   Afsaneh Asher RN

## 2023-09-13 ENCOUNTER — HOSPITAL ENCOUNTER (EMERGENCY)
Age: 53
Discharge: HOME | DRG: 640 | End: 2023-09-13
Attending: EMERGENCY MEDICINE
Payer: MEDICARE

## 2023-09-13 ENCOUNTER — APPOINTMENT (OUTPATIENT)
Dept: GENERAL RADIOLOGY | Age: 53
DRG: 640 | End: 2023-09-13
Payer: MEDICARE

## 2023-09-13 VITALS
WEIGHT: 216 LBS | RESPIRATION RATE: 30 BRPM | DIASTOLIC BLOOD PRESSURE: 122 MMHG | OXYGEN SATURATION: 94 % | HEIGHT: 69 IN | BODY MASS INDEX: 31.99 KG/M2 | SYSTOLIC BLOOD PRESSURE: 180 MMHG | HEART RATE: 106 BPM | TEMPERATURE: 97.2 F

## 2023-09-13 DIAGNOSIS — I50.23 ACUTE ON CHRONIC SYSTOLIC CONGESTIVE HEART FAILURE (HCC): Primary | ICD-10-CM

## 2023-09-13 DIAGNOSIS — Z87.448 HISTORY OF END STAGE RENAL DISEASE: ICD-10-CM

## 2023-09-13 LAB
ALBUMIN SERPL-MCNC: 4.3 G/DL (ref 3.4–5)
ALBUMIN/GLOB SERPL: 1.1 {RATIO} (ref 1.1–2.2)
ALP SERPL-CCNC: 117 U/L (ref 40–129)
ALT SERPL-CCNC: 9 U/L (ref 10–40)
ANION GAP SERPL CALCULATED.3IONS-SCNC: 17 MMOL/L (ref 3–16)
AST SERPL-CCNC: 10 U/L (ref 15–37)
BASE EXCESS BLDV CALC-SCNC: 0.5 MMOL/L (ref -3–3)
BASE EXCESS BLDV CALC-SCNC: 1.2 MMOL/L (ref -3–3)
BASOPHILS # BLD: 0 K/UL (ref 0–0.2)
BASOPHILS NFR BLD: 0.6 %
BILIRUB SERPL-MCNC: 0.3 MG/DL (ref 0–1)
BUN SERPL-MCNC: 58 MG/DL (ref 7–20)
CALCIUM SERPL-MCNC: 10.1 MG/DL (ref 8.3–10.6)
CHLORIDE SERPL-SCNC: 88 MMOL/L (ref 99–110)
CO2 BLDV-SCNC: 29 MMOL/L
CO2 BLDV-SCNC: 30 MMOL/L
CO2 SERPL-SCNC: 29 MMOL/L (ref 21–32)
COHGB MFR BLDV: 2.6 % (ref 0–1.5)
COHGB MFR BLDV: 3 % (ref 0–1.5)
CREAT SERPL-MCNC: 7.2 MG/DL (ref 0.9–1.3)
DEPRECATED RDW RBC AUTO: 17.7 % (ref 12.4–15.4)
EOSINOPHIL # BLD: 0.2 K/UL (ref 0–0.6)
EOSINOPHIL NFR BLD: 2.3 %
FLUAV RNA RESP QL NAA+PROBE: NOT DETECTED
FLUBV RNA RESP QL NAA+PROBE: NOT DETECTED
GFR SERPLBLD CREATININE-BSD FMLA CKD-EPI: 8 ML/MIN/{1.73_M2}
GLUCOSE SERPL-MCNC: 332 MG/DL (ref 70–99)
HCO3 BLDV-SCNC: 26.9 MMOL/L (ref 23–29)
HCO3 BLDV-SCNC: 28.3 MMOL/L (ref 23–29)
HCT VFR BLD AUTO: 35.1 % (ref 40.5–52.5)
HGB BLD-MCNC: 11.4 G/DL (ref 13.5–17.5)
LYMPHOCYTES # BLD: 0.6 K/UL (ref 1–5.1)
LYMPHOCYTES NFR BLD: 6.4 %
MCH RBC QN AUTO: 29.5 PG (ref 26–34)
MCHC RBC AUTO-ENTMCNC: 32.5 G/DL (ref 31–36)
MCV RBC AUTO: 90.9 FL (ref 80–100)
METHGB MFR BLDV: 0.2 %
METHGB MFR BLDV: 0.3 %
MONOCYTES # BLD: 0.3 K/UL (ref 0–1.3)
MONOCYTES NFR BLD: 3.5 %
NEUTROPHILS # BLD: 7.7 K/UL (ref 1.7–7.7)
NEUTROPHILS NFR BLD: 87.2 %
NT-PROBNP SERPL-MCNC: ABNORMAL PG/ML (ref 0–124)
O2 THERAPY: ABNORMAL
O2 THERAPY: ABNORMAL
PCO2 BLDV: 51.3 MMHG (ref 40–50)
PCO2 BLDV: 56.1 MMHG (ref 40–50)
PH BLDV: 7.32 [PH] (ref 7.35–7.45)
PH BLDV: 7.34 [PH] (ref 7.35–7.45)
PLATELET # BLD AUTO: 268 K/UL (ref 135–450)
PMV BLD AUTO: 7.5 FL (ref 5–10.5)
PO2 BLDV: 42.1 MMHG (ref 25–40)
PO2 BLDV: 43.4 MMHG (ref 25–40)
POTASSIUM SERPL-SCNC: 4.9 MMOL/L (ref 3.5–5.1)
PROT SERPL-MCNC: 8.2 G/DL (ref 6.4–8.2)
RBC # BLD AUTO: 3.86 M/UL (ref 4.2–5.9)
SAO2 % BLDV: 71 %
SAO2 % BLDV: 73 %
SARS-COV-2 RNA RESP QL NAA+PROBE: NOT DETECTED
SODIUM SERPL-SCNC: 134 MMOL/L (ref 136–145)
TROPONIN, HIGH SENSITIVITY: 140 NG/L (ref 0–22)
TROPONIN, HIGH SENSITIVITY: 156 NG/L (ref 0–22)
WBC # BLD AUTO: 8.8 K/UL (ref 4–11)

## 2023-09-13 PROCEDURE — 85025 COMPLETE CBC W/AUTO DIFF WBC: CPT

## 2023-09-13 PROCEDURE — 87636 SARSCOV2 & INF A&B AMP PRB: CPT

## 2023-09-13 PROCEDURE — 96374 THER/PROPH/DIAG INJ IV PUSH: CPT

## 2023-09-13 PROCEDURE — 80053 COMPREHEN METABOLIC PANEL: CPT

## 2023-09-13 PROCEDURE — 96375 TX/PRO/DX INJ NEW DRUG ADDON: CPT

## 2023-09-13 PROCEDURE — 82803 BLOOD GASES ANY COMBINATION: CPT

## 2023-09-13 PROCEDURE — 83880 ASSAY OF NATRIURETIC PEPTIDE: CPT

## 2023-09-13 PROCEDURE — 2500000003 HC RX 250 WO HCPCS: Performed by: EMERGENCY MEDICINE

## 2023-09-13 PROCEDURE — 84484 ASSAY OF TROPONIN QUANT: CPT

## 2023-09-13 PROCEDURE — 6370000000 HC RX 637 (ALT 250 FOR IP): Performed by: EMERGENCY MEDICINE

## 2023-09-13 PROCEDURE — 71045 X-RAY EXAM CHEST 1 VIEW: CPT

## 2023-09-13 PROCEDURE — 6360000002 HC RX W HCPCS: Performed by: EMERGENCY MEDICINE

## 2023-09-13 PROCEDURE — 99285 EMERGENCY DEPT VISIT HI MDM: CPT

## 2023-09-13 PROCEDURE — 93005 ELECTROCARDIOGRAM TRACING: CPT | Performed by: EMERGENCY MEDICINE

## 2023-09-13 RX ORDER — OXYCODONE HYDROCHLORIDE 5 MG/1
TABLET ORAL
COMMUNITY
Start: 2023-07-28

## 2023-09-13 RX ORDER — FUROSEMIDE 10 MG/ML
40 INJECTION INTRAMUSCULAR; INTRAVENOUS ONCE
Status: COMPLETED | OUTPATIENT
Start: 2023-09-13 | End: 2023-09-13

## 2023-09-13 RX ORDER — OXYCODONE HYDROCHLORIDE 5 MG/1
5 TABLET ORAL ONCE
Status: COMPLETED | OUTPATIENT
Start: 2023-09-13 | End: 2023-09-13

## 2023-09-13 RX ORDER — METOPROLOL TARTRATE 5 MG/5ML
5 INJECTION INTRAVENOUS ONCE
Status: COMPLETED | OUTPATIENT
Start: 2023-09-13 | End: 2023-09-13

## 2023-09-13 RX ORDER — LORAZEPAM 2 MG/ML
0.5 INJECTION INTRAMUSCULAR ONCE
Status: COMPLETED | OUTPATIENT
Start: 2023-09-13 | End: 2023-09-13

## 2023-09-13 RX ADMIN — FUROSEMIDE 40 MG: 10 INJECTION, SOLUTION INTRAMUSCULAR; INTRAVENOUS at 08:09

## 2023-09-13 RX ADMIN — LORAZEPAM 0.5 MG: 2 INJECTION INTRAMUSCULAR; INTRAVENOUS at 10:20

## 2023-09-13 RX ADMIN — METOPROLOL TARTRATE 5 MG: 5 INJECTION INTRAVENOUS at 08:10

## 2023-09-13 RX ADMIN — OXYCODONE HYDROCHLORIDE 5 MG: 5 TABLET ORAL at 10:12

## 2023-09-13 RX ADMIN — NITROGLYCERIN 0.5 INCH: 20 OINTMENT TOPICAL at 08:10

## 2023-09-13 NOTE — ED NOTES
Discharge paperwork given to and reviewed with pt. Pt verbalized understanding and all questions answered. Pt encouraged to return if having worsening symptoms or new symptoms discussed in discharge paperwork. Pt to follow up with Pulmonology, PCP  IV removed with catheter intact. Pt off unit via wheelchair and Oxygen tank. RN assisted pt into [employer] which will take him to dialysis for his appointment.        Roxana Thomas RN  09/13/23 2932

## 2023-09-13 NOTE — CARE COORDINATION
Referred to patient per MD for HD today at his clinic. Spoke to [employer]. Patient is due at 1100 for HD, they can run him today. His chair time is 3.5 hours. MD and RN updated. Electronically signed by NINO Bloom on 9/13/2023 at 10:19 AM     UPDATE:  Patient provided with oxygen tank via [employer] and [employer] to get to HD. MD and RN notified.   Electronically signed by NINO Bloom on 9/13/2023 at 11:07 AM

## 2023-09-13 NOTE — ED PROVIDER NOTES
Normocephalic. Atraumatic. EYES: PERRL. EOM's grossly intact. ENT: Mucous membranes are moist.   NECK: Supple, trachea midline. HEART: Tachycardic. Irregular. Normal S1, S2. No murmurs, rubs or gallops. LUNGS: Tachypnea. Decreased lung sounds throughout. . No wheezes, rales, or rhonchi. Speaking in 1-2 word sentences. ABDOMEN: Soft. Non-distended. Non-tender. No guarding or rebound. Normal Bowel sounds. EXTREMITIES: No peripheral edema. MAEE. No acute deformities. SKIN: Warm and dry. No acute rashes. NEUROLOGICAL: Alert and oriented X 3. CN II-XII intact. No gross facial drooping. Strength 5/5 in all extremities. Sensation intact. No pronator drift. Normal coordination. Gait normal.   PSYCHIATRIC: Anxious. DIAGNOSTIC RESULTS     LABS:   Labs Reviewed   COVID-19 & INFLUENZA COMBO   CBC WITH AUTO DIFFERENTIAL   COMPREHENSIVE METABOLIC PANEL   TROPONIN   BRAIN NATRIURETIC PEPTIDE   BLOOD GAS, VENOUS   BLOOD GAS, ARTERIAL   BLOOD GAS, VENOUS      When ordered only abnormal lab results are displayed. All other labs were within normal range or not returned as of this dictation. RADIOLOGY:      Non-plain film images such as CT, Ultrasound and MRI are read by the radiologist. Plain radiographic images are visualized and preliminarily interpreted by the ED Provider with the below findings:   Interpretation per the Radiologist below, if available at the time of this note:     XR CHEST PORTABLE   Final Result   Relatively stable appearance of the chest with left pleural effusion and   basilar airspace change. No results found. EKG: Atrial fibrillation with RVR. Rate of 126. Otherwise normal intervals and durations. Nonspecific ST and T wave changes noted. This is replaced sinus rhythm with frequent PACs/PVCs from previous EKG on 8/28/2023.     CRITICAL CARE TIME     Critical care of 35 minutes was completed on patient in addition to and excluding any procedures noted secondary to concerns for recurrent respiratory distress and tachycardia. Vitals:    Vitals:    09/13/23 0710 09/13/23 0717   BP: (!) 188/158 (!) 190/123   Pulse: (!) 132 (!) 113   Resp: 27 19   SpO2: 94% 100%   Weight: 216 lb (98 kg)    Height: 5' 9\" (1.753 m)              Is this patient to be included in the SEP-1 Core Measure due to severe sepsis or septic shock? No   Exclusion criteria - the patient is NOT to be included for SEP-1 Core Measure due to: Infection is not suspected       CC/HPI Summary, DDx, ED Course, and Reassessment:   Patient presents to the emergency department with acute respiratory failure and tachycardia. Patient has history of end-stage renal disease on hemodialysis. He was scheduled for today but had not yet presented for his dialysis. He also suffers from recurrent CHF exacerbations and noncompliance. Patient arrives into the emergency department tachycardic and tachypneic. He had not yet taken his morning medications. Patient was provided Nitropaste, Lasix, and a single dose of Lopressor with significant improvement of symptomology. He continued with anxiety and Ativan was also provided. Patient was able to be transition from BiPAP and repeat VBG appears to be stable and without evidence of impending respiratory failure. Case management was consulted and was able to assist with acquiring an oxygen tank and ride to outpatient dialysis. Patient was given the following medications:   Medications   furosemide (LASIX) injection 40 mg (has no administration in time range)   nitroglycerin (NITRO-BID) 2 % ointment 0.5 inch (has no administration in time range)   metoprolol (LOPRESSOR) injection 5 mg (has no administration in time range)        CONSULTS:   None   Discussion with Other Professionals: None   {Social Determinants: None   Chronic Conditions: End-stage renal disease with CHF. Chronic oxygen requirement  Records Reviewed: Care plan reviewed.   Lasix/Nitropaste initiated

## 2023-09-13 NOTE — ED NOTES
RN called RT to try to wean pt off BiPAP settings to see what he tolerates, per DO Monzon. Plan is to d/c to dialysis if pt can tolerate baseline O2 requirement.      Roseline Stephen  09/13/23 9446

## 2023-09-13 NOTE — PROGRESS NOTES
09/13/23 0717   NIV Type   NIV Started/Stopped (S)  On   Equipment Type v60   Mode Bilevel   Mask Type Full face mask   Mask Size Large   Assessment   Pulse (!) 113   Respirations 19   BP (!) 190/123   SpO2 100 %   Using Accessory Muscles Yes   Mask Compliance Good   Skin Assessment Clean, dry, & intact   Skin Protection for O2 Device Yes   Location Nose   Settings/Measurements   PIP Observed 18 cm H20   IPAP 16 cmH20   CPAP/EPAP 8 cmH2O   Vt (Measured) 726 mL   Rate Ordered 12   FiO2  60 %   I Time/ I Time % 1 s   Minute Volume (L/min) 20.1 Liters   Mask Leak (lpm) 46 lpm   Patient's Home Machine No   Alarm Settings   Alarms On Y   Low Pressure (cmH2O) 6 cmH2O   High Pressure (cmH2O) 30 cmH2O   Apnea (secs) 20 secs   RR Low (bpm) 6   RR High (bpm) 40 br/min

## 2023-09-13 NOTE — ED NOTES
Pt calm and resting quietly with equal rise and fall of chest. Pt in NAD, RR even and unlabored. Side rails up, bed locked and in lowest position. Pt updated on plan of care. No needs at this time. Pt instructed on use of call light if needed.        Benigno Mathis, [address]  09/13/23 9203

## 2023-09-14 ENCOUNTER — APPOINTMENT (OUTPATIENT)
Dept: CT IMAGING | Age: 53
DRG: 640 | End: 2023-09-14
Payer: MEDICARE

## 2023-09-14 ENCOUNTER — HOSPITAL ENCOUNTER (EMERGENCY)
Age: 53
Discharge: HOME | DRG: 640 | End: 2023-09-15
Attending: EMERGENCY MEDICINE
Payer: MEDICARE

## 2023-09-14 ENCOUNTER — TELEPHONE (OUTPATIENT)
Dept: OTHER | Facility: CLINIC | Age: 53
End: 2023-09-14

## 2023-09-14 ENCOUNTER — APPOINTMENT (OUTPATIENT)
Dept: GENERAL RADIOLOGY | Age: 53
DRG: 640 | End: 2023-09-14
Payer: MEDICARE

## 2023-09-14 DIAGNOSIS — I50.9 CONGESTIVE HEART FAILURE, UNSPECIFIED HF CHRONICITY, UNSPECIFIED HEART FAILURE TYPE (HCC): Primary | ICD-10-CM

## 2023-09-14 DIAGNOSIS — R06.02 SHORTNESS OF BREATH: ICD-10-CM

## 2023-09-14 DIAGNOSIS — R07.89 CHEST TIGHTNESS: ICD-10-CM

## 2023-09-14 DIAGNOSIS — N18.6 ESRD ON HEMODIALYSIS (HCC): ICD-10-CM

## 2023-09-14 DIAGNOSIS — Z99.2 ESRD ON HEMODIALYSIS (HCC): ICD-10-CM

## 2023-09-14 LAB
ALBUMIN SERPL-MCNC: 3.9 G/DL (ref 3.4–5)
ALBUMIN/GLOB SERPL: 1 {RATIO} (ref 1.1–2.2)
ALP SERPL-CCNC: 114 U/L (ref 40–129)
ALT SERPL-CCNC: 9 U/L (ref 10–40)
ANION GAP SERPL CALCULATED.3IONS-SCNC: 17 MMOL/L (ref 3–16)
AST SERPL-CCNC: 13 U/L (ref 15–37)
BASE EXCESS BLDV CALC-SCNC: 2 MMOL/L (ref -3–3)
BASOPHILS # BLD: 0.1 K/UL (ref 0–0.2)
BASOPHILS NFR BLD: 0.6 %
BILIRUB SERPL-MCNC: 0.3 MG/DL (ref 0–1)
BUN SERPL-MCNC: 55 MG/DL (ref 7–20)
CALCIUM SERPL-MCNC: 9.7 MG/DL (ref 8.3–10.6)
CHLORIDE SERPL-SCNC: 87 MMOL/L (ref 99–110)
CO2 BLDV-SCNC: 30 MMOL/L
CO2 SERPL-SCNC: 29 MMOL/L (ref 21–32)
COHGB MFR BLDV: 3 % (ref 0–1.5)
CREAT SERPL-MCNC: 6.6 MG/DL (ref 0.9–1.3)
DEPRECATED RDW RBC AUTO: 17.4 % (ref 12.4–15.4)
EKG ATRIAL RATE: 126 BPM
EKG DIAGNOSIS: NORMAL
EKG Q-T INTERVAL: 310 MS
EKG QRS DURATION: 94 MS
EKG QTC CALCULATION (BAZETT): 448 MS
EKG R AXIS: 31 DEGREES
EKG T AXIS: 48 DEGREES
EKG VENTRICULAR RATE: 126 BPM
EOSINOPHIL # BLD: 0.2 K/UL (ref 0–0.6)
EOSINOPHIL NFR BLD: 1.8 %
GFR SERPLBLD CREATININE-BSD FMLA CKD-EPI: 9 ML/MIN/{1.73_M2}
GLUCOSE SERPL-MCNC: 492 MG/DL (ref 70–99)
HCO3 BLDV-SCNC: 28 MMOL/L (ref 23–29)
HCT VFR BLD AUTO: 34.2 % (ref 40.5–52.5)
HGB BLD-MCNC: 10.9 G/DL (ref 13.5–17.5)
LYMPHOCYTES # BLD: 0.5 K/UL (ref 1–5.1)
LYMPHOCYTES NFR BLD: 5 %
MCH RBC QN AUTO: 29.3 PG (ref 26–34)
MCHC RBC AUTO-ENTMCNC: 31.9 G/DL (ref 31–36)
MCV RBC AUTO: 91.8 FL (ref 80–100)
METHGB MFR BLDV: 0.1 %
MONOCYTES # BLD: 0.4 K/UL (ref 0–1.3)
MONOCYTES NFR BLD: 4 %
NEUTROPHILS # BLD: 9.4 K/UL (ref 1.7–7.7)
NEUTROPHILS NFR BLD: 88.6 %
NT-PROBNP SERPL-MCNC: ABNORMAL PG/ML (ref 0–124)
O2 THERAPY: ABNORMAL
PCO2 BLDV: 49.7 MMHG (ref 40–50)
PH BLDV: 7.37 [PH] (ref 7.35–7.45)
PLATELET # BLD AUTO: 280 K/UL (ref 135–450)
PMV BLD AUTO: 7.8 FL (ref 5–10.5)
PO2 BLDV: 50.9 MMHG (ref 25–40)
POTASSIUM SERPL-SCNC: 5.2 MMOL/L (ref 3.5–5.1)
PROT SERPL-MCNC: 7.7 G/DL (ref 6.4–8.2)
RBC # BLD AUTO: 3.73 M/UL (ref 4.2–5.9)
SAO2 % BLDV: 82 %
SODIUM SERPL-SCNC: 133 MMOL/L (ref 136–145)
TROPONIN, HIGH SENSITIVITY: 123 NG/L (ref 0–22)
WBC # BLD AUTO: 10.7 K/UL (ref 4–11)

## 2023-09-14 PROCEDURE — 80053 COMPREHEN METABOLIC PANEL: CPT

## 2023-09-14 PROCEDURE — 71045 X-RAY EXAM CHEST 1 VIEW: CPT

## 2023-09-14 PROCEDURE — 85025 COMPLETE CBC W/AUTO DIFF WBC: CPT

## 2023-09-14 PROCEDURE — 71250 CT THORAX DX C-: CPT

## 2023-09-14 PROCEDURE — 2500000003 HC RX 250 WO HCPCS: Performed by: NURSE PRACTITIONER

## 2023-09-14 PROCEDURE — 6360000002 HC RX W HCPCS: Performed by: NURSE PRACTITIONER

## 2023-09-14 PROCEDURE — 6370000000 HC RX 637 (ALT 250 FOR IP): Performed by: NURSE PRACTITIONER

## 2023-09-14 PROCEDURE — 96374 THER/PROPH/DIAG INJ IV PUSH: CPT

## 2023-09-14 PROCEDURE — 99285 EMERGENCY DEPT VISIT HI MDM: CPT

## 2023-09-14 PROCEDURE — 84484 ASSAY OF TROPONIN QUANT: CPT

## 2023-09-14 PROCEDURE — 83880 ASSAY OF NATRIURETIC PEPTIDE: CPT

## 2023-09-14 PROCEDURE — 93010 ELECTROCARDIOGRAM REPORT: CPT | Performed by: INTERNAL MEDICINE

## 2023-09-14 PROCEDURE — 96372 THER/PROPH/DIAG INJ SC/IM: CPT

## 2023-09-14 PROCEDURE — 82803 BLOOD GASES ANY COMBINATION: CPT

## 2023-09-14 RX ORDER — FUROSEMIDE 10 MG/ML
60 INJECTION INTRAMUSCULAR; INTRAVENOUS ONCE
Status: COMPLETED | OUTPATIENT
Start: 2023-09-14 | End: 2023-09-14

## 2023-09-14 RX ORDER — FUROSEMIDE 10 MG/ML
40 INJECTION INTRAMUSCULAR; INTRAVENOUS ONCE
Status: DISCONTINUED | OUTPATIENT
Start: 2023-09-14 | End: 2023-09-14

## 2023-09-14 RX ORDER — FUROSEMIDE 10 MG/ML
60 INJECTION INTRAMUSCULAR; INTRAVENOUS ONCE
Status: DISCONTINUED | OUTPATIENT
Start: 2023-09-14 | End: 2023-09-14

## 2023-09-14 RX ORDER — METOPROLOL TARTRATE 5 MG/5ML
5 INJECTION INTRAVENOUS ONCE
Status: COMPLETED | OUTPATIENT
Start: 2023-09-14 | End: 2023-09-14

## 2023-09-14 RX ORDER — OXYCODONE HYDROCHLORIDE 5 MG/1
5 TABLET ORAL ONCE
Status: COMPLETED | OUTPATIENT
Start: 2023-09-14 | End: 2023-09-14

## 2023-09-14 RX ADMIN — METOPROLOL TARTRATE 5 MG: 5 INJECTION INTRAVENOUS at 22:48

## 2023-09-14 RX ADMIN — FUROSEMIDE 60 MG: 10 INJECTION, SOLUTION INTRAMUSCULAR; INTRAVENOUS at 22:03

## 2023-09-14 RX ADMIN — OXYCODONE HYDROCHLORIDE 5 MG: 5 TABLET ORAL at 22:46

## 2023-09-14 RX ADMIN — NITROGLYCERIN 1 INCH: 20 OINTMENT TOPICAL at 21:53

## 2023-09-14 ASSESSMENT — PAIN SCALES - GENERAL: PAINLEVEL_OUTOF10: 10

## 2023-09-14 ASSESSMENT — PAIN DESCRIPTION - ORIENTATION: ORIENTATION: LOWER

## 2023-09-14 ASSESSMENT — PAIN DESCRIPTION - LOCATION: LOCATION: BACK

## 2023-09-14 ASSESSMENT — PAIN DESCRIPTION - DESCRIPTORS: DESCRIPTORS: DISCOMFORT

## 2023-09-15 ENCOUNTER — TELEPHONE (OUTPATIENT)
Dept: OTHER | Facility: CLINIC | Age: 53
End: 2023-09-15

## 2023-09-15 ENCOUNTER — HOSPITAL ENCOUNTER (INPATIENT)
Age: 53
LOS: 3 days | Discharge: HOME | DRG: 640 | End: 2023-09-18
Attending: INTERNAL MEDICINE
Payer: MEDICARE

## 2023-09-15 ENCOUNTER — APPOINTMENT (OUTPATIENT)
Dept: GENERAL RADIOLOGY | Age: 53
DRG: 640 | End: 2023-09-15
Payer: MEDICARE

## 2023-09-15 ENCOUNTER — TELEPHONE (OUTPATIENT)
Dept: ADMINISTRATIVE | Age: 53
End: 2023-09-15

## 2023-09-15 VITALS
TEMPERATURE: 97.6 F | HEART RATE: 99 BPM | SYSTOLIC BLOOD PRESSURE: 146 MMHG | RESPIRATION RATE: 22 BRPM | OXYGEN SATURATION: 97 % | DIASTOLIC BLOOD PRESSURE: 71 MMHG

## 2023-09-15 DIAGNOSIS — I16.0 HYPERTENSIVE URGENCY: Primary | ICD-10-CM

## 2023-09-15 DIAGNOSIS — R06.89 DYSPNEA AND RESPIRATORY ABNORMALITIES: ICD-10-CM

## 2023-09-15 DIAGNOSIS — R06.00 DYSPNEA AND RESPIRATORY ABNORMALITIES: ICD-10-CM

## 2023-09-15 PROBLEM — E87.70 VOLUME OVERLOAD: Status: ACTIVE | Noted: 2023-09-15

## 2023-09-15 LAB
ALBUMIN SERPL-MCNC: 4.3 G/DL (ref 3.4–5)
ALBUMIN/GLOB SERPL: 1.1 {RATIO} (ref 1.1–2.2)
ALP SERPL-CCNC: 122 U/L (ref 40–129)
ALT SERPL-CCNC: 14 U/L (ref 10–40)
ANION GAP SERPL CALCULATED.3IONS-SCNC: 21 MMOL/L (ref 3–16)
AST SERPL-CCNC: 28 U/L (ref 15–37)
BASE EXCESS BLDV CALC-SCNC: 1.5 MMOL/L (ref -3–3)
BASOPHILS # BLD: 0.2 K/UL (ref 0–0.2)
BASOPHILS NFR BLD: 1.4 %
BILIRUB SERPL-MCNC: 0.3 MG/DL (ref 0–1)
BUN SERPL-MCNC: 65 MG/DL (ref 7–20)
CALCIUM SERPL-MCNC: 10.1 MG/DL (ref 8.3–10.6)
CHLORIDE SERPL-SCNC: 88 MMOL/L (ref 99–110)
CO2 BLDV-SCNC: 31 MMOL/L
CO2 SERPL-SCNC: 25 MMOL/L (ref 21–32)
COHGB MFR BLDV: 2 % (ref 0–1.5)
CREAT SERPL-MCNC: 7 MG/DL (ref 0.9–1.3)
DEPRECATED RDW RBC AUTO: 17.2 % (ref 12.4–15.4)
EKG ATRIAL RATE: 109 BPM
EKG DIAGNOSIS: NORMAL
EKG Q-T INTERVAL: 370 MS
EKG QRS DURATION: 104 MS
EKG QTC CALCULATION (BAZETT): 498 MS
EKG R AXIS: 41 DEGREES
EKG T AXIS: 113 DEGREES
EKG VENTRICULAR RATE: 109 BPM
EOSINOPHIL # BLD: 0.4 K/UL (ref 0–0.6)
EOSINOPHIL NFR BLD: 3.6 %
GFR SERPLBLD CREATININE-BSD FMLA CKD-EPI: 9 ML/MIN/{1.73_M2}
GLUCOSE BLD-MCNC: 491 MG/DL (ref 70–99)
GLUCOSE SERPL-MCNC: 245 MG/DL (ref 70–99)
HCO3 BLDV-SCNC: 28.9 MMOL/L (ref 23–29)
HCT VFR BLD AUTO: 35.4 % (ref 40.5–52.5)
HGB BLD-MCNC: 11.2 G/DL (ref 13.5–17.5)
LYMPHOCYTES # BLD: 0.7 K/UL (ref 1–5.1)
LYMPHOCYTES NFR BLD: 5.7 %
MCH RBC QN AUTO: 28.8 PG (ref 26–34)
MCHC RBC AUTO-ENTMCNC: 31.6 G/DL (ref 31–36)
MCV RBC AUTO: 91.3 FL (ref 80–100)
METHGB MFR BLDV: 0.3 %
MONOCYTES # BLD: 0.5 K/UL (ref 0–1.3)
MONOCYTES NFR BLD: 4.4 %
NEUTROPHILS # BLD: 10.4 K/UL (ref 1.7–7.7)
NEUTROPHILS NFR BLD: 84.9 %
NT-PROBNP SERPL-MCNC: ABNORMAL PG/ML (ref 0–124)
O2 THERAPY: ABNORMAL
PCO2 BLDV: 58.5 MMHG (ref 40–50)
PERFORMED ON: ABNORMAL
PH BLDV: 7.31 [PH] (ref 7.35–7.45)
PLATELET # BLD AUTO: 329 K/UL (ref 135–450)
PMV BLD AUTO: 8.1 FL (ref 5–10.5)
PO2 BLDV: 37.5 MMHG (ref 25–40)
POTASSIUM SERPL-SCNC: 5.1 MMOL/L (ref 3.5–5.1)
POTASSIUM SERPL-SCNC: 5.6 MMOL/L (ref 3.5–5.1)
PROT SERPL-MCNC: 8.3 G/DL (ref 6.4–8.2)
RBC # BLD AUTO: 3.88 M/UL (ref 4.2–5.9)
SAO2 % BLDV: 61 %
SODIUM SERPL-SCNC: 134 MMOL/L (ref 136–145)
TROPONIN, HIGH SENSITIVITY: 127 NG/L (ref 0–22)
TROPONIN, HIGH SENSITIVITY: 132 NG/L (ref 0–22)
TROPONIN, HIGH SENSITIVITY: 143 NG/L (ref 0–22)
WBC # BLD AUTO: 12.2 K/UL (ref 4–11)

## 2023-09-15 PROCEDURE — 84484 ASSAY OF TROPONIN QUANT: CPT

## 2023-09-15 PROCEDURE — 94761 N-INVAS EAR/PLS OXIMETRY MLT: CPT

## 2023-09-15 PROCEDURE — 6360000002 HC RX W HCPCS: Performed by: PHYSICIAN ASSISTANT

## 2023-09-15 PROCEDURE — 96372 THER/PROPH/DIAG INJ SC/IM: CPT

## 2023-09-15 PROCEDURE — 2580000003 HC RX 258: Performed by: INTERNAL MEDICINE

## 2023-09-15 PROCEDURE — 99285 EMERGENCY DEPT VISIT HI MDM: CPT

## 2023-09-15 PROCEDURE — 6370000000 HC RX 637 (ALT 250 FOR IP): Performed by: NURSE PRACTITIONER

## 2023-09-15 PROCEDURE — 6370000000 HC RX 637 (ALT 250 FOR IP): Performed by: PHYSICIAN ASSISTANT

## 2023-09-15 PROCEDURE — 82803 BLOOD GASES ANY COMBINATION: CPT

## 2023-09-15 PROCEDURE — 96374 THER/PROPH/DIAG INJ IV PUSH: CPT

## 2023-09-15 PROCEDURE — 80053 COMPREHEN METABOLIC PANEL: CPT

## 2023-09-15 PROCEDURE — 83880 ASSAY OF NATRIURETIC PEPTIDE: CPT

## 2023-09-15 PROCEDURE — 84132 ASSAY OF SERUM POTASSIUM: CPT

## 2023-09-15 PROCEDURE — 94660 CPAP INITIATION&MGMT: CPT

## 2023-09-15 PROCEDURE — 85025 COMPLETE CBC W/AUTO DIFF WBC: CPT

## 2023-09-15 PROCEDURE — 2060000000 HC ICU INTERMEDIATE R&B

## 2023-09-15 PROCEDURE — 2500000003 HC RX 250 WO HCPCS: Performed by: PHYSICIAN ASSISTANT

## 2023-09-15 PROCEDURE — 71045 X-RAY EXAM CHEST 1 VIEW: CPT

## 2023-09-15 PROCEDURE — 2700000000 HC OXYGEN THERAPY PER DAY

## 2023-09-15 PROCEDURE — 6370000000 HC RX 637 (ALT 250 FOR IP): Performed by: INTERNAL MEDICINE

## 2023-09-15 PROCEDURE — 6370000000 HC RX 637 (ALT 250 FOR IP): Performed by: EMERGENCY MEDICINE

## 2023-09-15 PROCEDURE — 96375 TX/PRO/DX INJ NEW DRUG ADDON: CPT

## 2023-09-15 RX ORDER — ACETAMINOPHEN 325 MG/1
650 TABLET ORAL EVERY 6 HOURS PRN
Status: DISCONTINUED | OUTPATIENT
Start: 2023-09-15 | End: 2023-09-18 | Stop reason: HOSPADM

## 2023-09-15 RX ORDER — MIDODRINE HYDROCHLORIDE 5 MG/1
10 TABLET ORAL PRN
Status: DISCONTINUED | OUTPATIENT
Start: 2023-09-15 | End: 2023-09-18 | Stop reason: HOSPADM

## 2023-09-15 RX ORDER — FUROSEMIDE 10 MG/ML
40 INJECTION INTRAMUSCULAR; INTRAVENOUS ONCE
Status: COMPLETED | OUTPATIENT
Start: 2023-09-15 | End: 2023-09-15

## 2023-09-15 RX ORDER — SODIUM CHLORIDE 9 MG/ML
INJECTION, SOLUTION INTRAVENOUS PRN
Status: DISCONTINUED | OUTPATIENT
Start: 2023-09-15 | End: 2023-09-18 | Stop reason: HOSPADM

## 2023-09-15 RX ORDER — SENNA AND DOCUSATE SODIUM 50; 8.6 MG/1; MG/1
1 TABLET, FILM COATED ORAL PRN
Status: DISCONTINUED | OUTPATIENT
Start: 2023-09-15 | End: 2023-09-18 | Stop reason: HOSPADM

## 2023-09-15 RX ORDER — DULOXETIN HYDROCHLORIDE 60 MG/1
60 CAPSULE, DELAYED RELEASE ORAL DAILY
Status: DISCONTINUED | OUTPATIENT
Start: 2023-09-16 | End: 2023-09-18 | Stop reason: HOSPADM

## 2023-09-15 RX ORDER — INSULIN LISPRO 100 [IU]/ML
0-4 INJECTION, SOLUTION INTRAVENOUS; SUBCUTANEOUS NIGHTLY
Status: DISCONTINUED | OUTPATIENT
Start: 2023-09-15 | End: 2023-09-18 | Stop reason: HOSPADM

## 2023-09-15 RX ORDER — DEXTROSE MONOHYDRATE 100 MG/ML
INJECTION, SOLUTION INTRAVENOUS CONTINUOUS PRN
Status: DISCONTINUED | OUTPATIENT
Start: 2023-09-15 | End: 2023-09-18 | Stop reason: HOSPADM

## 2023-09-15 RX ORDER — TORSEMIDE 100 MG/1
100 TABLET ORAL DAILY
Status: DISCONTINUED | OUTPATIENT
Start: 2023-09-15 | End: 2023-09-18 | Stop reason: HOSPADM

## 2023-09-15 RX ORDER — CLOPIDOGREL BISULFATE 75 MG/1
75 TABLET ORAL DAILY
Status: DISCONTINUED | OUTPATIENT
Start: 2023-09-16 | End: 2023-09-18 | Stop reason: HOSPADM

## 2023-09-15 RX ORDER — CALCIUM CARBONATE 500 MG/1
1 TABLET, CHEWABLE ORAL 3 TIMES DAILY PRN
Status: DISCONTINUED | OUTPATIENT
Start: 2023-09-15 | End: 2023-09-18 | Stop reason: HOSPADM

## 2023-09-15 RX ORDER — DOCUSATE SODIUM 100 MG/1
100 CAPSULE, LIQUID FILLED ORAL PRN
Status: DISCONTINUED | OUTPATIENT
Start: 2023-09-15 | End: 2023-09-18 | Stop reason: HOSPADM

## 2023-09-15 RX ORDER — ISOSORBIDE DINITRATE 20 MG/1
20 TABLET ORAL 3 TIMES DAILY
Status: DISCONTINUED | OUTPATIENT
Start: 2023-09-15 | End: 2023-09-18 | Stop reason: HOSPADM

## 2023-09-15 RX ORDER — TRAZODONE HYDROCHLORIDE 50 MG/1
50 TABLET ORAL DAILY
Status: DISCONTINUED | OUTPATIENT
Start: 2023-09-15 | End: 2023-09-18 | Stop reason: HOSPADM

## 2023-09-15 RX ORDER — INSULIN LISPRO 100 [IU]/ML
0-8 INJECTION, SOLUTION INTRAVENOUS; SUBCUTANEOUS
Status: DISCONTINUED | OUTPATIENT
Start: 2023-09-16 | End: 2023-09-18 | Stop reason: HOSPADM

## 2023-09-15 RX ORDER — NITROGLYCERIN 0.4 MG/1
0.4 TABLET SUBLINGUAL EVERY 5 MIN PRN
Status: DISCONTINUED | OUTPATIENT
Start: 2023-09-15 | End: 2023-09-18 | Stop reason: HOSPADM

## 2023-09-15 RX ORDER — SODIUM CHLORIDE 9 MG/ML
INJECTION, SOLUTION INTRAVENOUS CONTINUOUS
Status: DISCONTINUED | OUTPATIENT
Start: 2023-09-15 | End: 2023-09-18

## 2023-09-15 RX ORDER — ACETAMINOPHEN 650 MG/1
650 SUPPOSITORY RECTAL EVERY 6 HOURS PRN
Status: DISCONTINUED | OUTPATIENT
Start: 2023-09-15 | End: 2023-09-18 | Stop reason: HOSPADM

## 2023-09-15 RX ORDER — PRAVASTATIN SODIUM 40 MG
40 TABLET ORAL DAILY
Status: DISCONTINUED | OUTPATIENT
Start: 2023-09-15 | End: 2023-09-18 | Stop reason: HOSPADM

## 2023-09-15 RX ORDER — BISACODYL 5 MG/1
5 TABLET, DELAYED RELEASE ORAL DAILY PRN
Status: DISCONTINUED | OUTPATIENT
Start: 2023-09-15 | End: 2023-09-18 | Stop reason: HOSPADM

## 2023-09-15 RX ORDER — QUETIAPINE FUMARATE 25 MG/1
50 TABLET, FILM COATED ORAL NIGHTLY
Status: DISCONTINUED | OUTPATIENT
Start: 2023-09-15 | End: 2023-09-18 | Stop reason: HOSPADM

## 2023-09-15 RX ORDER — IPRATROPIUM BROMIDE AND ALBUTEROL SULFATE 2.5; .5 MG/3ML; MG/3ML
1 SOLUTION RESPIRATORY (INHALATION) EVERY 6 HOURS PRN
Status: DISCONTINUED | OUTPATIENT
Start: 2023-09-15 | End: 2023-09-18 | Stop reason: HOSPADM

## 2023-09-15 RX ORDER — SODIUM CHLORIDE 0.9 % (FLUSH) 0.9 %
5-40 SYRINGE (ML) INJECTION EVERY 12 HOURS SCHEDULED
Status: DISCONTINUED | OUTPATIENT
Start: 2023-09-15 | End: 2023-09-18 | Stop reason: HOSPADM

## 2023-09-15 RX ORDER — ONDANSETRON 2 MG/ML
4 INJECTION INTRAMUSCULAR; INTRAVENOUS EVERY 6 HOURS PRN
Status: DISCONTINUED | OUTPATIENT
Start: 2023-09-15 | End: 2023-09-18 | Stop reason: HOSPADM

## 2023-09-15 RX ORDER — ONDANSETRON 4 MG/1
4 TABLET, ORALLY DISINTEGRATING ORAL EVERY 8 HOURS PRN
Status: DISCONTINUED | OUTPATIENT
Start: 2023-09-15 | End: 2023-09-18 | Stop reason: HOSPADM

## 2023-09-15 RX ORDER — SODIUM CHLORIDE 0.9 % (FLUSH) 0.9 %
5-40 SYRINGE (ML) INJECTION PRN
Status: DISCONTINUED | OUTPATIENT
Start: 2023-09-15 | End: 2023-09-18 | Stop reason: HOSPADM

## 2023-09-15 RX ORDER — METOPROLOL TARTRATE 5 MG/5ML
5 INJECTION INTRAVENOUS ONCE
Status: COMPLETED | OUTPATIENT
Start: 2023-09-15 | End: 2023-09-15

## 2023-09-15 RX ORDER — MAGNESIUM HYDROXIDE/ALUMINUM HYDROXICE/SIMETHICONE 120; 1200; 1200 MG/30ML; MG/30ML; MG/30ML
30 SUSPENSION ORAL EVERY 6 HOURS PRN
Status: DISCONTINUED | OUTPATIENT
Start: 2023-09-15 | End: 2023-09-18 | Stop reason: HOSPADM

## 2023-09-15 RX ORDER — METOPROLOL SUCCINATE 50 MG/1
100 TABLET, EXTENDED RELEASE ORAL 2 TIMES DAILY
Status: DISCONTINUED | OUTPATIENT
Start: 2023-09-15 | End: 2023-09-18 | Stop reason: HOSPADM

## 2023-09-15 RX ORDER — PANTOPRAZOLE SODIUM 40 MG/1
40 TABLET, DELAYED RELEASE ORAL
Status: DISCONTINUED | OUTPATIENT
Start: 2023-09-16 | End: 2023-09-18 | Stop reason: HOSPADM

## 2023-09-15 RX ADMIN — INSULIN HUMAN 10 UNITS: 100 INJECTION, SOLUTION PARENTERAL at 02:00

## 2023-09-15 RX ADMIN — TORSEMIDE 100 MG: 100 TABLET ORAL at 22:43

## 2023-09-15 RX ADMIN — SODIUM CHLORIDE: 9 INJECTION, SOLUTION INTRAVENOUS at 22:42

## 2023-09-15 RX ADMIN — METOPROLOL TARTRATE 5 MG: 5 INJECTION, SOLUTION INTRAVENOUS at 15:37

## 2023-09-15 RX ADMIN — QUETIAPINE FUMARATE 50 MG: 25 TABLET ORAL at 22:36

## 2023-09-15 RX ADMIN — APIXABAN 5 MG: 5 TABLET, FILM COATED ORAL at 22:46

## 2023-09-15 RX ADMIN — TRAZODONE HYDROCHLORIDE 50 MG: 50 TABLET ORAL at 22:36

## 2023-09-15 RX ADMIN — ISOSORBIDE DINITRATE 20 MG: 20 TABLET ORAL at 22:36

## 2023-09-15 RX ADMIN — INSULIN LISPRO 4 UNITS: 100 INJECTION, SOLUTION INTRAVENOUS; SUBCUTANEOUS at 22:35

## 2023-09-15 RX ADMIN — PRAVASTATIN SODIUM 40 MG: 40 TABLET ORAL at 22:36

## 2023-09-15 RX ADMIN — NITROGLYCERIN 1 INCH: 20 OINTMENT TOPICAL at 13:58

## 2023-09-15 RX ADMIN — Medication 10 ML: at 22:44

## 2023-09-15 RX ADMIN — METOPROLOL SUCCINATE 100 MG: 50 TABLET, EXTENDED RELEASE ORAL at 22:36

## 2023-09-15 RX ADMIN — FUROSEMIDE 40 MG: 10 INJECTION, SOLUTION INTRAMUSCULAR; INTRAVENOUS at 13:58

## 2023-09-15 ASSESSMENT — PAIN DESCRIPTION - LOCATION
LOCATION: BACK
LOCATION: BACK

## 2023-09-15 ASSESSMENT — PAIN SCALES - GENERAL
PAINLEVEL_OUTOF10: 8
PAINLEVEL_OUTOF10: 8

## 2023-09-15 ASSESSMENT — LIFESTYLE VARIABLES
HOW OFTEN DO YOU HAVE A DRINK CONTAINING ALCOHOL: NEVER
HOW MANY STANDARD DRINKS CONTAINING ALCOHOL DO YOU HAVE ON A TYPICAL DAY: PATIENT DOES NOT DRINK

## 2023-09-15 ASSESSMENT — PAIN - FUNCTIONAL ASSESSMENT
PAIN_FUNCTIONAL_ASSESSMENT: 0-10
PAIN_FUNCTIONAL_ASSESSMENT: 0-10

## 2023-09-15 ASSESSMENT — PAIN DESCRIPTION - ORIENTATION: ORIENTATION: LOWER

## 2023-09-15 NOTE — ED NOTES
Pt stated that he is Short of Breath; RN turned up pt oxygen level to 6L; pt ripped of Dania Roberts RN told provider      Coral Bo RN  09/15/23 2000

## 2023-09-15 NOTE — H&P
times daily for 30 days. 5/11/23 6/10/23  Ginger Arora MD   midodrine (PROAMATINE) 10 MG tablet Take 1 tablet by mouth as needed (Give in dialysis now and prn for SBP <110.) 5/11/23 6/10/23  Ginger Arora MD   DULoxetine (CYMBALTA) 60 MG extended release capsule Take 1 capsule by mouth daily 3/17/23   Sue Campbell MD   lactobacillus (CULTURELLE) capsule Take 1 capsule by mouth daily (with breakfast)  Patient taking differently: Take 1 capsule by mouth daily (with breakfast) Pt states he takes as need 3/17/23   Sue Campbell MD   torsemide [facility]) 100 MG tablet  3/2/23   Historical Provider, MD   pravastatin (PRAVACHOL) 40 MG tablet TAKE 1 TABLET BY MOUTH DAILY 2/7/23   JAY Wall CNP   clopidogrel (PLAVIX) 75 MG tablet TAKE 1 TABLET BY MOUTH DAILY 2/7/23   JAY Wall CNP   docusate sodium (DOK) 100 MG capsule Take 1 capsule by mouth as needed for Constipation 12/13/22   Cortez Alegria DO   sennosides-docusate sodium (SENOKOT-S) 8.6-50 MG tablet Take 1 tablet by mouth as needed for Constipation 12/13/22   Cortez Alegria DO   metoprolol succinate (TOPROL XL) 100 MG extended release tablet Take 1 tablet by mouth in the morning and at bedtime 7/21/22   Myles Napier MD   QUEtiapine (SEROQUEL) 50 MG tablet TAKE 1 TABLET BY MOUTH EVERY EVENING 6/30/22   Isabela Lerma MD   isosorbide dinitrate (ISORDIL) 20 MG tablet Take 1 tablet by mouth 3 times daily  Patient taking differently: Take 1 tablet by mouth 3 times daily Pt states he take two times a day.  6/8/22   JASE Pérez   pantoprazole (PROTONIX) 40 MG tablet TAKE 1 TABLET BY MOUTH EVERY MORNING BEFORE BREAKFAST 4/28/22   Isabela Lerma MD   B Complex-C-Folic Acid (VIRT-CAPS) 1 MG CAPS TAKE 1 CAPSULE BY MOUTH EVERY DAY 9/20/21   Isabela Lerma MD   Calcium Acetate, Phos Binder, 667 MG CAPS TAKE 1 CAPSULE BY MOUTH THREE TIMES DAILY WITH MEALS 8/12/21   Isabela Lerma MD   nitroGLYCERIN

## 2023-09-15 NOTE — PROGRESS NOTES
Placed patient on the following settings once removed from squad's CPAP, awaiting further orders.    09/15/23 1033   NIV Type   $NIV $Daily Charge   NIV Started/Stopped On   Equipment Type v60   Mask Type Full face mask   Mask Size Medium   Assessment   Respirations 22   BP (!) 156/136   SpO2 100 %   Level of Consciousness 0   Comfort Level Good   Skin Assessment Clean, dry, & intact   Skin Protection for O2 Device Yes   Orientation Middle   Location Nose   Breath Sounds   Breath Sounds Bilateral Diminished   Right Upper Lobe Diminished   Right Middle Lobe Diminished   Right Lower Lobe Diminished   Left Upper Lobe Diminished   Left Lower Lobe Diminished   Settings/Measurements   IPAP 12 cmH20   CPAP/EPAP 6 cmH2O   Vt (Measured) 558 mL   Rate Ordered 12   FiO2  50 %   Minute Volume (L/min) 12.2 Liters   Mask Leak (lpm) 40 lpm   Patient's Home Machine No   Alarm Settings   Alarms On Y   Low Pressure (cmH2O) 6 cmH2O   High Pressure (cmH2O) 30 cmH2O   Delay Alarm 20 sec(s)   RR Low (bpm) 6   RR High (bpm) 40 br/min

## 2023-09-15 NOTE — TELEPHONE ENCOUNTER
Writer contacted ED provider to inform of 30 day readmission risk. ED provider reached back out to informed the writer of intention to discharge and recommended follow up with Pcp and cardiologist (same day) Friday if possible if not Monday. She would also recommend that the patient seek Palliative Care.     Attending physician:  Dr Geronimo Blanchard

## 2023-09-15 NOTE — CONSULTS
1848- Called [facility]  Per Amaya LAGUNA  Re sob/htn/missed dialysis  1851- Dr. Mayer Search returned page

## 2023-09-15 NOTE — PROGRESS NOTES
09/15/23 1045   Oxygen Therapy/Pulse Ox   O2 Therapy Oxygen   O2 Device PAP (positive airway pressure)   FiO2  50 %  (titrated to 40%)   Pulse (!) 106   Respirations 21   SpO2 100 %

## 2023-09-15 NOTE — ED PROVIDER NOTES
consistent with CKD with elevated BUN and creatinine at 65 and 7.0. Creatinine of 5.6. Repeat potassium pending. BNP was greater than 70,000. Initial troponin of 143 with a repeat of 132. Chest x-ray remained stable. EKG consistent with sinus tachycardia with PVCs. Patient is hypertensive improved with IV labetalol. He remains dyspneic and short of breath. Case discussed with both nephrology as well as hospital medicine regarding admission for hypertensive urgency with ongoing shortness of breath. Admission orders have been placed. Critical Care Time:   30 Minutes of critical care time spent not including separately billable procedures. FINAL IMPRESSION  No diagnosis found. Based on emergency department evaluation do not feel that additional emergent treatment/work-up indicated at this time. Based on limitations of ongoing treatment and ruling out additional medical conditions from the emergency department, I do feel that admission indicated. Recommendations for admission have been discussed with Chelsea Garcia and/or surrogate who are in agreement with plan at this time. DISPOSITION:  Patient was admitted to the hospital in stable condition. (Please note that portions of this note were completed with a voice recognition program.  Efforts were made to edit the dictations but occasionally words are mis-transcribed).           Darlin Nicole, [address] [place] [address] Road  09/15/23 9543

## 2023-09-15 NOTE — TELEPHONE ENCOUNTER
Writer contacted ED provider to inform of 30 day readmission risk. Writer's attempt to contact ED provider was unsuccessful. No Decision on disposition at this time.       Call Back: If you need to call back to inform of disposition you can contact me at [phone]

## 2023-09-15 NOTE — ED NOTES
Attempted to ambulate pt on pulse ox on 4L O2 via NC. Pt took 5 steps around stretcher and stated he cannot go on any further d/t being SOB. Pt's O2 sat 94-96% with HR . Pt yelling while sitting on side of stretcher that he can't catch his breath.  JASE Sharp notified     Yadira Perez  09/15/23 3710

## 2023-09-15 NOTE — ED NOTES
Patients wife on her way. Had to go back home to get oxygen tank.  Patient to be discharged when she arrives       Trupti Rg RN  09/15/23 8930

## 2023-09-15 NOTE — ED PROVIDER NOTES
Francesco Vargas was evaluated in the Emergency Department. Although initial history and physical exam information was obtained by JOSIANE/NPP/MD/ (who also dictated a record of this visit), I personally saw the patient and performed a substantive portion of the visit including all aspects of the medical decision making. Briefly, this is a 52 y.o. male here for worsening shortness of breath. The patient denies chest pain. He has no productive cough. No fevers or chills. No nausea or vomiting. The patient has a history of congestive heart failure, he is end-stage renal disease on hemodialysis and he had a full run of dialysis yesterday which was after he had come into the emergency room. The patient states that he is tired of being back and forth in the hospital and he just wants to know what is going on and hopefully he can get better. On exam, chronically ill-appearing adult male in obvious respiratory distress the patient has increased work of breathing he is on 5 L of oxygen at baseline and is saturating normally. He is tachycardic with an irregular rhythm. Abdomen is soft nontender nondistended. Breath sounds are decreased bilaterally worse on the left. The patient has dialysis access left upper chest    EKG  The Ekg interpreted by me in the absence of a cardiologist shows:  Atrial fibrillation of rapid ventricular response at a rate of 109 bpm, QRS and QTc normal.  Patient has occasional PVCs  No significant change from prior EKG dated September 13, 2023        Screenings   [place] Coma Scale  Eye Opening: Spontaneous  Best Verbal Response: Oriented  Best Motor Response: Obeys commands  [place] Coma Scale Score: 15        Critical Time  Total Critical Care time was 50 minutes, excluding separately reportable procedures. There was a high probability of clinically significant/life threatening deterioration in the patient's condition which required my urgent intervention.          MDM  This is an

## 2023-09-15 NOTE — PROGRESS NOTES
09/15/23 1628   NIV Type   NIV Started/Stopped On   Equipment Type v60   Mode Bilevel   Mask Type Full face mask   Mask Size Medium   Assessment   Pulse 92   Respirations 20   BP (!) 174/154   SpO2 98 %   Comfort Level Good   Using Accessory Muscles Yes   Mask Compliance Good   Skin Assessment Clean, dry, & intact   Skin Protection for O2 Device No  (pt refusing)   Settings/Measurements   PIP Observed 13 cm H20   IPAP 12 cmH20   CPAP/EPAP 6 cmH2O   Vt (Measured) 464 mL   Rate Ordered 12   FiO2  40 %   Minute Volume (L/min) 9.4 Liters   Mask Leak (lpm) 53 lpm   Patient's Home Machine No   Alarm Settings   Alarms On Y   Low Pressure (cmH2O) 6 cmH2O   High Pressure (cmH2O) 30 cmH2O   Apnea (secs) 20 secs   RR Low (bpm) 6   RR High (bpm) 40 br/min

## 2023-09-15 NOTE — TELEPHONE ENCOUNTER
Writer contacted Dr Yann Gievns to inform of 30 day readmission risk. Writer's attempt to contact Dr Yann Givens was unsuccessful.      Call Back: If you need to call back to inform of disposition you can contact me at [phone]

## 2023-09-15 NOTE — ED NOTES
Pt called out wanting a break from his CPAP. Pt placed on baseline 4LO2 NC.  Notified Agapito Bobby RN  09/15/23 9881

## 2023-09-16 LAB
ANION GAP SERPL CALCULATED.3IONS-SCNC: 19 MMOL/L (ref 3–16)
BASOPHILS # BLD: 0 K/UL (ref 0–0.2)
BASOPHILS NFR BLD: 0.1 %
BUN SERPL-MCNC: 87 MG/DL (ref 7–20)
CALCIUM SERPL-MCNC: 9.1 MG/DL (ref 8.3–10.6)
CHLORIDE SERPL-SCNC: 86 MMOL/L (ref 99–110)
CO2 SERPL-SCNC: 25 MMOL/L (ref 21–32)
CREAT SERPL-MCNC: 8.4 MG/DL (ref 0.9–1.3)
DEPRECATED RDW RBC AUTO: 17 % (ref 12.4–15.4)
EOSINOPHIL # BLD: 0 K/UL (ref 0–0.6)
EOSINOPHIL NFR BLD: 0.1 %
GFR SERPLBLD CREATININE-BSD FMLA CKD-EPI: 7 ML/MIN/{1.73_M2}
GLUCOSE BLD-MCNC: 157 MG/DL (ref 70–99)
GLUCOSE BLD-MCNC: 231 MG/DL (ref 70–99)
GLUCOSE BLD-MCNC: 233 MG/DL (ref 70–99)
GLUCOSE BLD-MCNC: 243 MG/DL (ref 70–99)
GLUCOSE BLD-MCNC: 277 MG/DL (ref 70–99)
GLUCOSE BLD-MCNC: 308 MG/DL (ref 70–99)
GLUCOSE BLD-MCNC: 425 MG/DL (ref 70–99)
GLUCOSE SERPL-MCNC: 241 MG/DL (ref 70–99)
HCT VFR BLD AUTO: 29.4 % (ref 40.5–52.5)
HGB BLD-MCNC: 9.4 G/DL (ref 13.5–17.5)
LYMPHOCYTES # BLD: 0.4 K/UL (ref 1–5.1)
LYMPHOCYTES NFR BLD: 4.5 %
MCH RBC QN AUTO: 29.1 PG (ref 26–34)
MCHC RBC AUTO-ENTMCNC: 32 G/DL (ref 31–36)
MCV RBC AUTO: 90.8 FL (ref 80–100)
MONOCYTES # BLD: 0.4 K/UL (ref 0–1.3)
MONOCYTES NFR BLD: 3.8 %
NEUTROPHILS # BLD: 8.8 K/UL (ref 1.7–7.7)
NEUTROPHILS NFR BLD: 91.5 %
PERFORMED ON: ABNORMAL
PLATELET # BLD AUTO: 242 K/UL (ref 135–450)
PMV BLD AUTO: 8.1 FL (ref 5–10.5)
POTASSIUM SERPL-SCNC: 5.2 MMOL/L (ref 3.5–5.1)
RBC # BLD AUTO: 3.24 M/UL (ref 4.2–5.9)
SODIUM SERPL-SCNC: 130 MMOL/L (ref 136–145)
WBC # BLD AUTO: 9.6 K/UL (ref 4–11)

## 2023-09-16 PROCEDURE — 2700000000 HC OXYGEN THERAPY PER DAY

## 2023-09-16 PROCEDURE — 2580000003 HC RX 258: Performed by: INTERNAL MEDICINE

## 2023-09-16 PROCEDURE — 6370000000 HC RX 637 (ALT 250 FOR IP): Performed by: INTERNAL MEDICINE

## 2023-09-16 PROCEDURE — 6370000000 HC RX 637 (ALT 250 FOR IP): Performed by: NURSE PRACTITIONER

## 2023-09-16 PROCEDURE — 90935 HEMODIALYSIS ONE EVALUATION: CPT

## 2023-09-16 PROCEDURE — 6360000002 HC RX W HCPCS

## 2023-09-16 PROCEDURE — 2060000000 HC ICU INTERMEDIATE R&B

## 2023-09-16 PROCEDURE — 5A1D70Z PERFORMANCE OF URINARY FILTRATION, INTERMITTENT, LESS THAN 6 HOURS PER DAY: ICD-10-PCS | Performed by: INTERNAL MEDICINE

## 2023-09-16 PROCEDURE — 94640 AIRWAY INHALATION TREATMENT: CPT

## 2023-09-16 PROCEDURE — 80048 BASIC METABOLIC PNL TOTAL CA: CPT

## 2023-09-16 PROCEDURE — 85025 COMPLETE CBC W/AUTO DIFF WBC: CPT

## 2023-09-16 PROCEDURE — 6360000002 HC RX W HCPCS: Performed by: INTERNAL MEDICINE

## 2023-09-16 PROCEDURE — 94761 N-INVAS EAR/PLS OXIMETRY MLT: CPT

## 2023-09-16 RX ORDER — MIDODRINE HYDROCHLORIDE 5 MG/1
TABLET ORAL
Status: DISPENSED
Start: 2023-09-16 | End: 2023-09-16

## 2023-09-16 RX ORDER — HEPARIN SODIUM 1000 [USP'U]/ML
INJECTION, SOLUTION INTRAVENOUS; SUBCUTANEOUS
Status: COMPLETED
Start: 2023-09-16 | End: 2023-09-16

## 2023-09-16 RX ORDER — HEPARIN SODIUM 1000 [USP'U]/ML
3600 INJECTION, SOLUTION INTRAVENOUS; SUBCUTANEOUS PRN
Status: DISCONTINUED | OUTPATIENT
Start: 2023-09-16 | End: 2023-09-18 | Stop reason: HOSPADM

## 2023-09-16 RX ORDER — OXYCODONE HYDROCHLORIDE 5 MG/1
5 TABLET ORAL EVERY 6 HOURS PRN
Status: DISCONTINUED | OUTPATIENT
Start: 2023-09-16 | End: 2023-09-18 | Stop reason: HOSPADM

## 2023-09-16 RX ADMIN — Medication 10 ML: at 13:49

## 2023-09-16 RX ADMIN — APIXABAN 5 MG: 5 TABLET, FILM COATED ORAL at 21:04

## 2023-09-16 RX ADMIN — METOPROLOL SUCCINATE 100 MG: 50 TABLET, EXTENDED RELEASE ORAL at 13:46

## 2023-09-16 RX ADMIN — IPRATROPIUM BROMIDE AND ALBUTEROL SULFATE 1 DOSE: 2.5; .5 SOLUTION RESPIRATORY (INHALATION) at 12:54

## 2023-09-16 RX ADMIN — QUETIAPINE FUMARATE 50 MG: 25 TABLET ORAL at 21:05

## 2023-09-16 RX ADMIN — Medication 10 ML: at 21:06

## 2023-09-16 RX ADMIN — DULOXETINE HYDROCHLORIDE 60 MG: 60 CAPSULE, DELAYED RELEASE ORAL at 16:56

## 2023-09-16 RX ADMIN — TORSEMIDE 100 MG: 100 TABLET ORAL at 13:47

## 2023-09-16 RX ADMIN — INSULIN LISPRO 2 UNITS: 100 INJECTION, SOLUTION INTRAVENOUS; SUBCUTANEOUS at 08:38

## 2023-09-16 RX ADMIN — HEPARIN SODIUM: 1000 INJECTION, SOLUTION INTRAVENOUS; SUBCUTANEOUS at 16:26

## 2023-09-16 RX ADMIN — ISOSORBIDE DINITRATE 20 MG: 20 TABLET ORAL at 13:47

## 2023-09-16 RX ADMIN — METOPROLOL SUCCINATE 100 MG: 50 TABLET, EXTENDED RELEASE ORAL at 21:04

## 2023-09-16 RX ADMIN — TRAZODONE HYDROCHLORIDE 50 MG: 50 TABLET ORAL at 22:31

## 2023-09-16 RX ADMIN — CLOPIDOGREL BISULFATE 75 MG: 75 TABLET ORAL at 13:47

## 2023-09-16 RX ADMIN — PRAVASTATIN SODIUM 40 MG: 40 TABLET ORAL at 21:05

## 2023-09-16 RX ADMIN — ISOSORBIDE DINITRATE 20 MG: 20 TABLET ORAL at 21:04

## 2023-09-16 RX ADMIN — PANTOPRAZOLE SODIUM 40 MG: 40 TABLET, DELAYED RELEASE ORAL at 08:38

## 2023-09-16 RX ADMIN — INSULIN HUMAN 10 UNITS: 100 INJECTION, SOLUTION PARENTERAL at 02:54

## 2023-09-16 RX ADMIN — HEPARIN SODIUM 3600 UNITS: 1000 INJECTION, SOLUTION INTRAVENOUS; SUBCUTANEOUS at 12:55

## 2023-09-16 RX ADMIN — OXYCODONE HYDROCHLORIDE 5 MG: 5 TABLET ORAL at 21:59

## 2023-09-16 RX ADMIN — MIDODRINE HYDROCHLORIDE 10 MG: 5 TABLET ORAL at 09:59

## 2023-09-16 RX ADMIN — INSULIN LISPRO 4 UNITS: 100 INJECTION, SOLUTION INTRAVENOUS; SUBCUTANEOUS at 17:15

## 2023-09-16 RX ADMIN — APIXABAN 5 MG: 5 TABLET, FILM COATED ORAL at 13:47

## 2023-09-16 ASSESSMENT — PAIN - FUNCTIONAL ASSESSMENT: PAIN_FUNCTIONAL_ASSESSMENT: ACTIVITIES ARE NOT PREVENTED

## 2023-09-16 ASSESSMENT — PAIN SCALES - GENERAL
PAINLEVEL_OUTOF10: 2
PAINLEVEL_OUTOF10: 6

## 2023-09-16 ASSESSMENT — PAIN DESCRIPTION - LOCATION: LOCATION: BACK

## 2023-09-16 ASSESSMENT — PAIN DESCRIPTION - PAIN TYPE: TYPE: CHRONIC PAIN

## 2023-09-16 ASSESSMENT — PAIN DESCRIPTION - ORIENTATION: ORIENTATION: LOWER

## 2023-09-16 ASSESSMENT — PAIN DESCRIPTION - DESCRIPTORS: DESCRIPTORS: STABBING

## 2023-09-16 NOTE — PROGRESS NOTES
V2.0    [facility] [place]      Name:  Seth Cavazos /Age/Sex: 1970  (52 y.o. male)   MRN & CSN:  [id number] & [id number] Encounter Date/Time: 2023 3:00 PM EDT   Location:  1396/1586-87 PCP: Dain Campbell MD     Attending:Adi Barraza [facility] Day: 2    Assessment and Recommendations   Seth Cavazos is a 52 y.o. male who presents with past medical history of essential hypertension, hyperlipidemia, type 2 diabetes mellitus, COPD, coronary artery disease, chronic systolic heart failure, A-fib, and ESRD. He presented on 9/15/2023 on account of shortness of breath after missing hemodialysis. ESRD on hemodialysis MWF. Hypervolemia in the setting of ESRD. Acute on chronic respiratory failure with hypoxia. Essential hypertension. Hyperlipidemia. Coronary artery disease. Chronic systolic heart failure. Atrial fibrillation. Type diabetes mellitus. COPD: No signs of acute exacerbation at this time. Plan:   Nephrology following. We appreciate the assistance. Continue dialysis support as dictated by nephrology. Continue diuresis with torsemide. Continue metoprolol for rate control in the setting of the A-fib. Continue systemic anticoagulation with Eliquis. Continue Plavix for his coronary artery disease. We will continue to monitor the patient's electrolytes closely. The patient's oxygen requirement is still around 4 L/min. This is his baseline. BP management with isosorbide dinitrate and metoprolol. Blood sugar management with low-dose sliding scale. Continue Cymbalta and Seroquel. Diet ADULT DIET; Regular; 3 carb choices (45 gm/meal);  Low Sodium (2 gm)   DVT Prophylaxis [] Lovenox, []  Heparin, [] SCDs, [] Ambulation,  [] Eliquis, [] Xarelto  [] Coumadin   Code Status Full Code   Disposition From: Home  Expected Disposition: Home with home health versus SNF  Estimated Date of Discharge:   Patient requires continued admission due to needing

## 2023-09-16 NOTE — CONSULTS
[facility] Consult Note           Reason for Consult:  End stage kidney disease  Requesting Physician:  Dr. Major Hilton    Chief Complaint:  Shortness of breath  History Obtained From:  patient, electronic medical record    History of Present Ilness:    52year old male with ESKD on HD Mon-Wed-Fri admitted with shortness of breath. We have been asked to assist in further dialysis care. Presented with shortness of breath, missed HD yesterday. HD completed today with 2.3 liters removed, post-weight of 98.7 kg, hypotensive with HD. Shortness of breath better also with respiratory treatments. No chest pain, abdominal pain, or fevers.     Past Medical History:        Diagnosis Date    Ambulatory dysfunction     walker for long distances, SOB with distance    Aortic stenosis     echo 2017    Arthritis     hands and hips    Asthma     Bilateral hilar adenopathy syndrome 6/3/2013    CAD (coronary artery disease)     Dr. Esperanza Walters [facility]) 04/19/2019    EF= 43%    CHF (congestive heart failure) ([address])     Chronic pain     COPD (chronic obstructive pulmonary disease) ([address])     pulmonology Dr. Justen Patrick    Depression     Diabetes mellitus ([address])     borderline    Difficult intravenous access     Emphysema of lung ([address])     ESRD (end stage renal disease) on dialysis ([address])     MWF    Fear of needles     Gastric ulcer     GERD (gastroesophageal reflux disease)     Heart valve problem     bicuspic valve    Hemodialysis patient ([address])     History of spinal fracture     work incident    Hx of blood clots     Bilateral lower extremities; stents in place    Hyperlipidemia     Hypertension     MI (myocardial infarction) ([address]) 2019    has had 9 MIs. 2019 was the last    Neuromuscular disorder ([address])     due to CVA    Numbness and tingling in left arm     from fistula    Pneumonia     PONV (postoperative nausea and vomiting)     Prolonged emergence from general

## 2023-09-16 NOTE — ED PROVIDER NOTES
DPM at [address] [facility] [address] [facility] [address] Right 5/26/2023    RIGHT FIFTH RAY AMPUTATION performed by Tameka Claudio DPM at [address] [facility] TUNNELED C/Casia 10 5 YEARS  3/21/2022    IR TUNNELED CATHETER PLACEMENT GREATER THAN 5 YEARS 3/21/2022 [facility]    IR TUNNELED CATHETER PLACEMENT GREATER THAN 5 YEARS  4/21/2022    IR TUNNELED CATHETER PLACEMENT GREATER THAN 5 YEARS 4/21/2022 [facility]    IR TUNNELED CATHETER PLACEMENT GREATER THAN 5 YEARS  4/26/2022    IR TUNNELED CATHETER PLACEMENT GREATER THAN 5 YEARS 4/26/2022 [facility]    IR TUNNELED CATHETER PLACEMENT GREATER THAN 5 YEARS  6/23/2022    IR TUNNELED CATHETER PLACEMENT GREATER THAN 5 YEARS 6/23/2022 [facility]    OTHER SURGICAL HISTORY  02/01/2017    laparoscopic cholecystectomy with intraoperative cholangiogram    OTHER SURGICAL HISTORY  2018    PORT PLACEMENT  - vas cath    OTHER SURGICAL HISTORY Bilateral 06/26/2018    laprascopic peritoneal dialysis catheter placement    OTHER SURGICAL HISTORY Right 09/2018    peritoneal dialysis port placed on right side of abdomen    OTHER SURGICAL HISTORY  05/28/2019    PTA/Stenting R External Iliac artery    AL LAPS INSERTION TUNNELED INTRAPERITONEAL CATHETER N/A 9/21/2018    LAPAROSCOPIC PERITONEAL DIALYSIS CATHETER REPLACEMENT performed by Mitchell Rajput MD at [address] [facility] [address] 2/24/2022    PERINEAL ABCESS DRAINAGE performed by Mitchell Rajput MD at [address] [facility] N/A 10/2/2022    THORACENTESIS ULTRASOUND performed by Debra Moran MD at [address]  01/06/2016    UPPER GASTROINTESTINAL ENDOSCOPY  01/29/2017    possible candida, otherwise normal appearing    VASCULAR SURGERY  aprx 2 years ago    2 stents placed, each side of groin       CURRENTMEDICATIONS       Discharge Medication List as of 9/15/2023  1:47 AM        CONTINUE these 4. Chest tightness          DISPOSITION/PLAN     DISPOSITION Decision To Discharge 09/15/2023 01:41:59 AM      PATIENT REFERRED TO:  Noa Colby MD  [address] [place] [address] Road  [id number] [facility]  [address]  [phone]    Call in 1 day  For further evaluation    [facility]  ED  [facility]  [phone]  Go to   If symptoms worsen      DISCHARGE MEDICATIONS:  Discharge Medication List as of 9/15/2023  1:47 AM          DISCONTINUED MEDICATIONS:  Discharge Medication List as of 9/15/2023  1:47 AM                 (Please note that portions of this note were completed with a voice recognition program.  Efforts were made to edit the dictations but occasionally words are mis-transcribed.)    JAY Jefferson CNP (electronically signed)        JAY Jefferson CNP  09/16/23 7935

## 2023-09-16 NOTE — PROGRESS NOTES
Treatment time: 3.5 hours  Net UF: 2400 ml     Pre weight: 101 kg  Post weight:98.7 kg  EDW: 99.5 kg      Access used: left tunneled cvc    Access function: goo with  ml/min     Regular outpatient schedule: m-w-f     Summary of response to treatment: Patient tolerated treatment fair with one episode of hypotension which resolved with 200 cc of saline. Given 10 mg of midirin during treatment      Report given to [facility] NAVJOT PINEDO and copy of dialysis treatment record placed in chart, to be scanned into EMR.

## 2023-09-16 NOTE — PROGRESS NOTES
4 Eyes Skin Assessment     NAME:  Ed Aguilar OF BIRTH:  1970  MEDICAL RECORD NUMBER:  [id number]    The patient is being assessed for  Admission    I agree that at least one RN has performed a thorough Head to Toe Skin Assessment on the patient. ALL assessment sites listed below have been assessed. Areas assessed by both nurses:    Head, Face, Ears, Shoulders, Back, Chest, Arms, Elbows, Hands, Sacrum. Buttock, Coccyx, Ischium, Legs. Feet and Heels, and Under Medical Devices         Does the Patient have a Wound? Yes wound(s) were present on assessment. LDA wound assessment was Initiated and completed by RN Wounds on top of toes bilateral, wound on Right heel.         Isaac Prevention initiated by RN: No  Wound Care Orders initiated by RN: No    Pressure Injury (Stage 3,4, Unstageable, DTI, NWPT, and Complex wounds) if present, place Wound referral order by RN under : No    New Ostomies, if present place, Ostomy referral order under : No     Nurse 1 eSignature: Electronically signed by Marzena Jarvis RN on 9/16/23 at 3:23 AM EDT    **SHARE this note so that the co-signing nurse can place an eSignature**    Nurse 2 eSignature: {Esignature:[id number]}

## 2023-09-16 NOTE — PROGRESS NOTES
Patient blood sugar 491, BP high see flowsheet, no sliding scale or insulin orders in [facility], notified hospitalist with results. Hospitalist put in a sliding scale ordered, patient given the 4 units per mar. Patient a/ox4. No s/s of distress. All safety precautions in place, call light within reach.

## 2023-09-16 NOTE — PROGRESS NOTES
Patient arrived with wounds to the tops of his toes bilateral feet, and to the right heel. Wound care consult needed.

## 2023-09-16 NOTE — PROGRESS NOTES
Recheck patients blood sugar, results 425, notified hospitalist. Orders to give patient 10 units reg insulin, sent up from pharmacy. Patient resting, easily awakened, no s/s of distress. Will continue to monitor.

## 2023-09-17 LAB
ALBUMIN SERPL-MCNC: 3.3 G/DL (ref 3.4–5)
ALBUMIN/GLOB SERPL: 1.2 {RATIO} (ref 1.1–2.2)
ALP SERPL-CCNC: 88 U/L (ref 40–129)
ALT SERPL-CCNC: 6 U/L (ref 10–40)
ANION GAP SERPL CALCULATED.3IONS-SCNC: 15 MMOL/L (ref 3–16)
AST SERPL-CCNC: 6 U/L (ref 15–37)
BASOPHILS # BLD: 0.1 K/UL (ref 0–0.2)
BASOPHILS NFR BLD: 0.7 %
BILIRUB SERPL-MCNC: <0.2 MG/DL (ref 0–1)
BUN SERPL-MCNC: 47 MG/DL (ref 7–20)
CALCIUM SERPL-MCNC: 8.8 MG/DL (ref 8.3–10.6)
CHLORIDE SERPL-SCNC: 93 MMOL/L (ref 99–110)
CO2 SERPL-SCNC: 22 MMOL/L (ref 21–32)
CREAT SERPL-MCNC: 5.2 MG/DL (ref 0.9–1.3)
DEPRECATED RDW RBC AUTO: 17.3 % (ref 12.4–15.4)
EOSINOPHIL # BLD: 0.3 K/UL (ref 0–0.6)
EOSINOPHIL NFR BLD: 3.5 %
GFR SERPLBLD CREATININE-BSD FMLA CKD-EPI: 12 ML/MIN/{1.73_M2}
GLUCOSE BLD-MCNC: 115 MG/DL (ref 70–99)
GLUCOSE BLD-MCNC: 170 MG/DL (ref 70–99)
GLUCOSE BLD-MCNC: 205 MG/DL (ref 70–99)
GLUCOSE BLD-MCNC: 220 MG/DL (ref 70–99)
GLUCOSE SERPL-MCNC: 213 MG/DL (ref 70–99)
HCT VFR BLD AUTO: 30.7 % (ref 40.5–52.5)
HGB BLD-MCNC: 9.6 G/DL (ref 13.5–17.5)
LYMPHOCYTES # BLD: 1.1 K/UL (ref 1–5.1)
LYMPHOCYTES NFR BLD: 12.4 %
MCH RBC QN AUTO: 28.7 PG (ref 26–34)
MCHC RBC AUTO-ENTMCNC: 31.2 G/DL (ref 31–36)
MCV RBC AUTO: 91.9 FL (ref 80–100)
MONOCYTES # BLD: 0.4 K/UL (ref 0–1.3)
MONOCYTES NFR BLD: 5.1 %
NEUTROPHILS # BLD: 6.9 K/UL (ref 1.7–7.7)
NEUTROPHILS NFR BLD: 78.3 %
PERFORMED ON: ABNORMAL
PLATELET # BLD AUTO: 233 K/UL (ref 135–450)
PMV BLD AUTO: 7.8 FL (ref 5–10.5)
POTASSIUM SERPL-SCNC: 4.8 MMOL/L (ref 3.5–5.1)
PROT SERPL-MCNC: 6 G/DL (ref 6.4–8.2)
RBC # BLD AUTO: 3.34 M/UL (ref 4.2–5.9)
SODIUM SERPL-SCNC: 130 MMOL/L (ref 136–145)
WBC # BLD AUTO: 8.8 K/UL (ref 4–11)

## 2023-09-17 PROCEDURE — 94660 CPAP INITIATION&MGMT: CPT

## 2023-09-17 PROCEDURE — 6370000000 HC RX 637 (ALT 250 FOR IP): Performed by: INTERNAL MEDICINE

## 2023-09-17 PROCEDURE — 2580000003 HC RX 258: Performed by: INTERNAL MEDICINE

## 2023-09-17 PROCEDURE — 85025 COMPLETE CBC W/AUTO DIFF WBC: CPT

## 2023-09-17 PROCEDURE — 6370000000 HC RX 637 (ALT 250 FOR IP): Performed by: NURSE PRACTITIONER

## 2023-09-17 PROCEDURE — 94761 N-INVAS EAR/PLS OXIMETRY MLT: CPT

## 2023-09-17 PROCEDURE — 80053 COMPREHEN METABOLIC PANEL: CPT

## 2023-09-17 PROCEDURE — 2700000000 HC OXYGEN THERAPY PER DAY

## 2023-09-17 PROCEDURE — 2060000000 HC ICU INTERMEDIATE R&B

## 2023-09-17 RX ADMIN — INSULIN LISPRO 2 UNITS: 100 INJECTION, SOLUTION INTRAVENOUS; SUBCUTANEOUS at 12:07

## 2023-09-17 RX ADMIN — ISOSORBIDE DINITRATE 20 MG: 20 TABLET ORAL at 09:10

## 2023-09-17 RX ADMIN — PANTOPRAZOLE SODIUM 40 MG: 40 TABLET, DELAYED RELEASE ORAL at 09:10

## 2023-09-17 RX ADMIN — INSULIN LISPRO 2 UNITS: 100 INJECTION, SOLUTION INTRAVENOUS; SUBCUTANEOUS at 09:11

## 2023-09-17 RX ADMIN — Medication 10 ML: at 09:11

## 2023-09-17 RX ADMIN — CLOPIDOGREL BISULFATE 75 MG: 75 TABLET ORAL at 09:10

## 2023-09-17 RX ADMIN — METOPROLOL SUCCINATE 100 MG: 50 TABLET, EXTENDED RELEASE ORAL at 09:10

## 2023-09-17 RX ADMIN — ISOSORBIDE DINITRATE 20 MG: 20 TABLET ORAL at 13:49

## 2023-09-17 RX ADMIN — OXYCODONE HYDROCHLORIDE 5 MG: 5 TABLET ORAL at 13:49

## 2023-09-17 RX ADMIN — APIXABAN 5 MG: 5 TABLET, FILM COATED ORAL at 22:07

## 2023-09-17 RX ADMIN — DULOXETINE HYDROCHLORIDE 60 MG: 60 CAPSULE, DELAYED RELEASE ORAL at 09:10

## 2023-09-17 RX ADMIN — PRAVASTATIN SODIUM 40 MG: 40 TABLET ORAL at 22:07

## 2023-09-17 RX ADMIN — QUETIAPINE FUMARATE 50 MG: 25 TABLET ORAL at 22:08

## 2023-09-17 RX ADMIN — METOPROLOL SUCCINATE 100 MG: 50 TABLET, EXTENDED RELEASE ORAL at 22:08

## 2023-09-17 RX ADMIN — TORSEMIDE 100 MG: 100 TABLET ORAL at 09:10

## 2023-09-17 RX ADMIN — ISOSORBIDE DINITRATE 20 MG: 20 TABLET ORAL at 22:07

## 2023-09-17 RX ADMIN — APIXABAN 5 MG: 5 TABLET, FILM COATED ORAL at 09:10

## 2023-09-17 ASSESSMENT — PAIN SCALES - GENERAL
PAINLEVEL_OUTOF10: 3
PAINLEVEL_OUTOF10: 5
PAINLEVEL_OUTOF10: 6

## 2023-09-17 ASSESSMENT — PAIN DESCRIPTION - LOCATION
LOCATION: GENERALIZED
LOCATION: BACK

## 2023-09-17 ASSESSMENT — PAIN DESCRIPTION - DESCRIPTORS: DESCRIPTORS: ACHING

## 2023-09-17 NOTE — PROGRESS NOTES
V2.0    [facility] [place]      Name:  Ezequiel French /Age/Sex: 1970  (52 y.o. male)   MRN & CSN:  [id number] & [id number] Encounter Date/Time: 2023 3:00 PM EDT   Location:  9263/1192-29 PCP: Rachel Singh MD     Attending:Adi Celeste [facility] Day: 3    Assessment and Recommendations   Ezequiel French is a 52 y.o. male who presents with past medical history of essential hypertension, hyperlipidemia, type 2 diabetes mellitus, COPD, coronary artery disease, chronic systolic heart failure, A-fib, and ESRD. He presented on 9/15/2023 on account of shortness of breath after missing hemodialysis. ESRD on hemodialysis MWF. Hypervolemia in the setting of ESRD. Acute on chronic respiratory failure with hypoxia. Essential hypertension. Hyperlipidemia. Coronary artery disease. Chronic systolic heart failure. Atrial fibrillation. Type diabetes mellitus. COPD: No signs of acute exacerbation at this time. Plan:   Nephrology following. We appreciate the assistance. Continue dialysis support as dictated by nephrology. Will DC home tomorrow after HD. Continue diuresis with torsemide. Continue metoprolol for rate control in the setting of the A-fib. Continue systemic anticoagulation with Eliquis. Continue Plavix for his coronary artery disease. We will continue to monitor the patient's electrolytes closely. The patient's oxygen requirement is still around 2 L/min. This is his baseline. BP management with isosorbide dinitrate and metoprolol. Blood sugar management with low-dose sliding scale. Continue Cymbalta and Seroquel. Diet ADULT DIET; Regular; 3 carb choices (45 gm/meal);  Low Sodium (2 gm)   DVT Prophylaxis [] Lovenox, []  Heparin, [] SCDs, [] Ambulation,  [] Eliquis, [] Xarelto  [] Coumadin   Code Status Full Code   Disposition From: Home  Expected Disposition: Home with home health versus SNF  Estimated Date of Discharge:   Patient requires

## 2023-09-17 NOTE — PROGRESS NOTES
09/17/23 0354   NIV Type   Equipment Type v60   Mode Bilevel   Mask Type Full face mask   Mask Size Medium   Assessment   Pulse 66   Heart Rate Source Monitor   SpO2 98 %   Breath Sounds   Breath Sounds Bilateral Diminished   Settings/Measurements   PIP Observed 12 cm H20   IPAP 12 cmH20   CPAP/EPAP 6 cmH2O   Vt (Measured) 444 mL   Rate Ordered 12   FiO2  40 %   Minute Volume (L/min) 7 Liters   Mask Leak (lpm) 60 lpm   Patient's Home Machine No   Alarm Settings   Alarms On Y   Low Pressure (cmH2O) 6 cmH2O   High Pressure (cmH2O) 30 cmH2O   Delay Alarm 20 sec(s)   Apnea (secs) 20 secs   RR Low (bpm) 6   RR High (bpm) 40 br/min

## 2023-09-17 NOTE — PROGRESS NOTES
09/17/23 0024   NIV Type   $NIV $Daily Charge   NIV Started/Stopped On   Equipment Type v60   Mode Bilevel   Mask Type Full face mask   Mask Size Medium   Assessment   Pulse 65   Heart Rate Source Monitor   Respirations 17   SpO2 98 %   Using Accessory Muscles No   Mask Compliance Good   Skin Assessment Clean, dry, & intact   Skin Protection for O2 Device Yes   Location Nose   Breath Sounds   Breath Sounds Bilateral Diminished   Settings/Measurements   PIP Observed 12 cm H20   IPAP 12 cmH20   CPAP/EPAP 6 cmH2O   Vt (Measured) 472 mL   Rate Ordered 12   FiO2  40 %   Minute Volume (L/min) 8.2 Liters   Mask Leak (lpm) 70 lpm   Patient's Home Machine No   Alarm Settings   Alarms On Y   Low Pressure (cmH2O) 6 cmH2O   High Pressure (cmH2O) 30 cmH2O   Delay Alarm 20 sec(s)   Apnea (secs) 20 secs   RR Low (bpm) 6   RR High (bpm) 40 br/min

## 2023-09-18 VITALS
BODY MASS INDEX: 32.07 KG/M2 | DIASTOLIC BLOOD PRESSURE: 76 MMHG | SYSTOLIC BLOOD PRESSURE: 115 MMHG | WEIGHT: 216.49 LBS | TEMPERATURE: 97.9 F | RESPIRATION RATE: 18 BRPM | OXYGEN SATURATION: 95 % | HEIGHT: 69 IN | HEART RATE: 56 BPM

## 2023-09-18 LAB
ALBUMIN SERPL-MCNC: 3.4 G/DL (ref 3.4–5)
ALBUMIN/GLOB SERPL: 1.2 {RATIO} (ref 1.1–2.2)
ALP SERPL-CCNC: 91 U/L (ref 40–129)
ALT SERPL-CCNC: <5 U/L (ref 10–40)
ANION GAP SERPL CALCULATED.3IONS-SCNC: 17 MMOL/L (ref 3–16)
AST SERPL-CCNC: 6 U/L (ref 15–37)
BASOPHILS # BLD: 0 K/UL (ref 0–0.2)
BASOPHILS NFR BLD: 0.6 %
BILIRUB SERPL-MCNC: 0.3 MG/DL (ref 0–1)
BUN SERPL-MCNC: 62 MG/DL (ref 7–20)
CALCIUM SERPL-MCNC: 8.6 MG/DL (ref 8.3–10.6)
CHLORIDE SERPL-SCNC: 91 MMOL/L (ref 99–110)
CO2 SERPL-SCNC: 22 MMOL/L (ref 21–32)
CREAT SERPL-MCNC: 6.5 MG/DL (ref 0.9–1.3)
DEPRECATED RDW RBC AUTO: 17 % (ref 12.4–15.4)
EOSINOPHIL # BLD: 0.3 K/UL (ref 0–0.6)
EOSINOPHIL NFR BLD: 4.2 %
GFR SERPLBLD CREATININE-BSD FMLA CKD-EPI: 9 ML/MIN/{1.73_M2}
GLUCOSE BLD-MCNC: 146 MG/DL (ref 70–99)
GLUCOSE BLD-MCNC: 148 MG/DL (ref 70–99)
GLUCOSE SERPL-MCNC: 156 MG/DL (ref 70–99)
HCT VFR BLD AUTO: 31.8 % (ref 40.5–52.5)
HGB BLD-MCNC: 10 G/DL (ref 13.5–17.5)
LYMPHOCYTES # BLD: 1.1 K/UL (ref 1–5.1)
LYMPHOCYTES NFR BLD: 13.4 %
MCH RBC QN AUTO: 28.6 PG (ref 26–34)
MCHC RBC AUTO-ENTMCNC: 31.5 G/DL (ref 31–36)
MCV RBC AUTO: 90.7 FL (ref 80–100)
MONOCYTES # BLD: 0.5 K/UL (ref 0–1.3)
MONOCYTES NFR BLD: 6.2 %
NEUTROPHILS # BLD: 6.3 K/UL (ref 1.7–7.7)
NEUTROPHILS NFR BLD: 75.6 %
PERFORMED ON: ABNORMAL
PERFORMED ON: ABNORMAL
PHOSPHATE SERPL-MCNC: 7.9 MG/DL (ref 2.5–4.9)
PLATELET # BLD AUTO: 273 K/UL (ref 135–450)
PMV BLD AUTO: 7.9 FL (ref 5–10.5)
POTASSIUM SERPL-SCNC: 4.8 MMOL/L (ref 3.5–5.1)
POTASSIUM SERPL-SCNC: 4.8 MMOL/L (ref 3.5–5.1)
PROT SERPL-MCNC: 6.3 G/DL (ref 6.4–8.2)
RBC # BLD AUTO: 3.51 M/UL (ref 4.2–5.9)
SODIUM SERPL-SCNC: 130 MMOL/L (ref 136–145)
WBC # BLD AUTO: 8.3 K/UL (ref 4–11)

## 2023-09-18 PROCEDURE — 6370000000 HC RX 637 (ALT 250 FOR IP): Performed by: INTERNAL MEDICINE

## 2023-09-18 PROCEDURE — 85025 COMPLETE CBC W/AUTO DIFF WBC: CPT

## 2023-09-18 PROCEDURE — 80053 COMPREHEN METABOLIC PANEL: CPT

## 2023-09-18 PROCEDURE — 2580000003 HC RX 258: Performed by: INTERNAL MEDICINE

## 2023-09-18 PROCEDURE — 6370000000 HC RX 637 (ALT 250 FOR IP): Performed by: NURSE PRACTITIONER

## 2023-09-18 PROCEDURE — 90935 HEMODIALYSIS ONE EVALUATION: CPT

## 2023-09-18 PROCEDURE — 2700000000 HC OXYGEN THERAPY PER DAY

## 2023-09-18 PROCEDURE — 02HV33Z INSERTION OF INFUSION DEVICE INTO SUPERIOR VENA CAVA, PERCUTANEOUS APPROACH: ICD-10-PCS | Performed by: INTERNAL MEDICINE

## 2023-09-18 PROCEDURE — 94761 N-INVAS EAR/PLS OXIMETRY MLT: CPT

## 2023-09-18 PROCEDURE — 94660 CPAP INITIATION&MGMT: CPT

## 2023-09-18 PROCEDURE — 36415 COLL VENOUS BLD VENIPUNCTURE: CPT

## 2023-09-18 RX ADMIN — CLOPIDOGREL BISULFATE 75 MG: 75 TABLET ORAL at 09:49

## 2023-09-18 RX ADMIN — APIXABAN 5 MG: 5 TABLET, FILM COATED ORAL at 09:49

## 2023-09-18 RX ADMIN — PANTOPRAZOLE SODIUM 40 MG: 40 TABLET, DELAYED RELEASE ORAL at 09:49

## 2023-09-18 RX ADMIN — METOPROLOL SUCCINATE 100 MG: 50 TABLET, EXTENDED RELEASE ORAL at 09:49

## 2023-09-18 RX ADMIN — Medication 10 ML: at 05:03

## 2023-09-18 RX ADMIN — DULOXETINE HYDROCHLORIDE 60 MG: 60 CAPSULE, DELAYED RELEASE ORAL at 09:49

## 2023-09-18 RX ADMIN — ISOSORBIDE DINITRATE 20 MG: 20 TABLET ORAL at 09:49

## 2023-09-18 RX ADMIN — TORSEMIDE 100 MG: 100 TABLET ORAL at 09:49

## 2023-09-18 RX ADMIN — OXYCODONE HYDROCHLORIDE 5 MG: 5 TABLET ORAL at 05:02

## 2023-09-18 RX ADMIN — TRAZODONE HYDROCHLORIDE 50 MG: 50 TABLET ORAL at 00:04

## 2023-09-18 ASSESSMENT — PAIN - FUNCTIONAL ASSESSMENT: PAIN_FUNCTIONAL_ASSESSMENT: ACTIVITIES ARE NOT PREVENTED

## 2023-09-18 ASSESSMENT — PAIN DESCRIPTION - DESCRIPTORS: DESCRIPTORS: ACHING

## 2023-09-18 ASSESSMENT — PAIN DESCRIPTION - LOCATION: LOCATION: BACK

## 2023-09-18 ASSESSMENT — PAIN DESCRIPTION - ORIENTATION: ORIENTATION: LOWER

## 2023-09-18 ASSESSMENT — PAIN DESCRIPTION - PAIN TYPE: TYPE: CHRONIC PAIN

## 2023-09-18 ASSESSMENT — PAIN SCALES - GENERAL: PAINLEVEL_OUTOF10: 9

## 2023-09-18 NOTE — DISCHARGE SUMMARY
Reason for Exam: sob/cough FINDINGS: The heart is enlarged. Vascular catheter stable on the left. Moderate perihilar opacities are present centrally. Persistent small left pleural effusion with dense airspace change in the left lower lung zone. There are no significant skeletal findings. Relatively stable appearance of the chest with left pleural effusion and basilar airspace change. CBC:   Recent Labs     09/16/23  0915 09/17/23  0642 09/18/23  0456   WBC 9.6 8.8 8.3   HGB 9.4* 9.6* 10.0*    233 273     BMP:    Recent Labs     09/16/23  0915 09/17/23  0642 09/18/23  0456   * 130* 130*   K 5.2* 4.8 4.8  4.8   CL 86* 93* 91*   CO2 25 22 22   BUN 87* 47* 62*   CREATININE 8.4* 5.2* 6.5*   GLUCOSE 241* 213* 156*     Hepatic:   Recent Labs     09/17/23 0642 09/18/23  0456   AST 6* 6*   ALT 6* <5*   BILITOT <0.2 0.3   ALKPHOS 88 91     Lipids:   Lab Results   Component Value Date/Time    CHOL 138 12/05/2022 09:10 AM    HDL 58 12/05/2022 09:10 AM    TRIG 75 12/05/2022 09:10 AM     Hemoglobin A1C:   Lab Results   Component Value Date/Time    LABA1C 7.8 08/28/2023 04:31 PM     TSH:   Lab Results   Component Value Date/Time    TSH 2.15 03/17/2022 08:33 PM     Troponin: No results found for: \"TROPONINT\"  Lactic Acid: No results for input(s): \"LACTA\" in the last 72 hours. BNP: No results for input(s): \"PROBNP\" in the last 72 hours.   UA:  Lab Results   Component Value Date/Time    NITRU Negative 05/01/2023 11:28 AM    COLORU Yellow 05/01/2023 11:28 AM    PHUR 8.0 05/01/2023 11:28 AM    LABCAST 3-5 Hyaline 03/10/2020 01:58 PM    WBCUA 3-5 05/01/2023 11:28 AM    RBCUA None seen 05/01/2023 11:28 AM    MUCUS Rare 03/18/2022 09:15 PM    BACTERIA Rare 12/25/2022 05:00 AM    CLARITYU Clear 05/01/2023 11:28 AM    SPECGRAV 1.015 05/01/2023 11:28 AM    LEUKOCYTESUR Negative 05/01/2023 11:28 AM    UROBILINOGEN 0.2 05/01/2023 11:28 AM    BILIRUBINUR Negative 05/01/2023 11:28 AM    BLOODU SMALL 05/01/2023 11:28

## 2023-09-18 NOTE — DISCHARGE INSTR - DIET

## 2023-09-18 NOTE — PROGRESS NOTES
09/18/23 0008   NIV Type   NIV Started/Stopped On   Equipment Type v60   Mode Bilevel   Mask Type Full face mask   Assessment   Pulse 66   Respirations 16   SpO2 99 %   Using Accessory Muscles No   Mask Compliance Good   Skin Assessment Clean, dry, & intact   Skin Protection for O2 Device N/A  (pt declines mepilex)   Settings/Measurements   PIP Observed 12 cm H20   IPAP 12 cmH20   CPAP/EPAP 6 cmH2O   Vt (Measured) 474 mL   Rate Ordered 12   FiO2  35 %   I Time/ I Time % 1.2 s   Minute Volume (L/min) 7.4 Liters   Mask Leak (lpm) 38 lpm  (pt has beard)   Patient's Home Machine No   Alarm Settings   Alarms On Y   Low Pressure (cmH2O) 6 cmH2O   High Pressure (cmH2O) 30 cmH2O   Delay Alarm 20 sec(s)   RR Low (bpm) 6   RR High (bpm) 40 br/min

## 2023-09-18 NOTE — PLAN OF CARE
Problem: Discharge Planning  Goal: Discharge to home or other facility with appropriate resources  Outcome: Progressing     Problem: ABCDS Injury Assessment  Goal: Absence of physical injury  Outcome: Progressing     Problem: Safety - Adult  Goal: Free from fall injury  Outcome: Progressing     Problem: Pain  Goal: Verbalizes/displays adequate comfort level or baseline comfort level  Outcome: Progressing     Problem: Skin/Tissue Integrity  Goal: Absence of new skin breakdown  Outcome: Progressing
Problem: Discharge Planning  Goal: Discharge to home or other facility with appropriate resources  Outcome: Progressing     Problem: Safety - Adult  Goal: Free from fall injury  Outcome: Progressing
electrolyte imbalances  Goal: Hemodynamic stability and optimal renal function maintained  9/18/2023 1427 by Maria Ines Cevallos RN  Outcome: Progressing  9/18/2023 0146 by Maycol Escobar RN  Outcome: Progressing  Flowsheets (Taken 9/18/2023 0146)  Hemodynamic stability and optimal renal function maintained:   Monitor labs and assess for signs and symptoms of volume excess or deficit   Monitor intake, output and patient weight   Monitor response to interventions for patient's volume status, including labs, urine output, blood pressure (other measures as available)     Problem: Chronic Conditions and Co-morbidities  Goal: Patient's chronic conditions and co-morbidity symptoms are monitored and maintained or improved  Outcome: Progressing

## 2023-09-18 NOTE — DIALYSIS
Treatment time: 3.5 hours  Net UF: 1800 ml     Pre weight: 100.4 kg  Post weight: 98.6 kg  EDW: 97 kg  Crit Line Used: Yes Ending Profile: C  Refill Present: No    Access used: L TDC    Access function: Well with  ml/min     Medications or blood products given: n/a     Regular outpatient schedule: BETTY Lagos     Summary of response to treatment: Patient tolerated treatment well and without any complications. Patient remained stable throughout entire treatment and upon exiting the dialysis suite via transport. Report given to Maeve Garcia RN and copy of dialysis treatment record placed in chart, to be scanned into EMR.

## 2023-09-18 NOTE — DISCHARGE INSTR - COC
Continuity of Care Form    Patient Name: Maria L Cramer   :  1970  MRN:  [id number]    Admit date:  9/15/2023  Discharge date:  2023    Code Status Order: Full Code   Advance Directives:     Admitting Physician:  No admitting provider for patient encounter. PCP: Todd Haddad MD    Discharging Nurse: Bowen Kruger, [address] Unit/Room#: 5320/8647-13  Discharging Unit Phone Number: [phone]    Emergency Contact:   Extended Emergency Contact Information  Primary Emergency Contact: Crystal [facility]  Address: [address], [address] [place] [address] [place] Phone: [phone]  Mobile Phone: [phone]  Relation: Spouse  Secondary Emergency Contact: ALICIA [facility] Phone: [phone]  Mobile Phone: [phone]  Relation: Brother/Sister  Preferred language: English   needed?  No    Past Surgical History:  Past Surgical History:   Procedure Laterality Date    AORTIC VALVE REPLACEMENT N/A 10/15/2019    TRANSCATHETER AORTIC VALVE REPLACEMENT FEMORAL APPROACH performed by Radha Garner MD at [address] [facility] Right 2019    PERITONEAL DIALYSIS CATHETER REMOVAL performed by Mel Sanon MD at [address] [place] [address]      WN    CORONARY ANGIOPLASTY WITH STENT PLACEMENT  05/26/15    CYST REMOVAL  2013    EXCISION CYSTS, NECK X2 AND ABDOMINAL benign    DIAGNOSTIC CARDIAC CATH LAB PROCEDURE      DIALYSIS FISTULA CREATION Left 10/30/2017    LEFT BRACHIAL CEPHALIC FISTULA    DIALYSIS FISTULA CREATION Left 3/27/2019    LIGATION  AV FISTULA performed by Jesse Velez MD at [address] [facility], [address] [facility] Right 2023    INCISION AND DEBRIDEMENT RIGHT FOOT WITH performed by Maureen Cardoza DPM at [address] Right 2023    RIGHT FIFTH RAY AMPUTATION performed by Maureen Cardoza DPM at [address] [facility] TUNNELED CATHETER

## 2023-09-18 NOTE — WOUND CARE
[address] Wound Ostomy Continence Nurse  Consult Note       NAME:  Ezequiel French  MEDICAL RECORD NUMBER:  [id number]  AGE: 52 y.o. GENDER: male  : 1970  TODAY'S DATE:  2023    Subjective; I'm a diabatic yes. NO I don't follow anyone.    Reason for WOCN Evaluation and Assessment:    wounds on bilat feet   Ezequiel French is a 52 y.o. male referred by:   [] Physician  [x] Nursing  [] Other:     Wound Identification:  Wound Type: diabetic  Contributing Factors: diabetes, poor glucose control, chronic pressure, decreased mobility, shear force, and obesity    Wound History: 52 y.o. male with a Significant PMH as below who presented to ED with c/o shortness of breath and missing hemodialysis today  Current Wound Care Treatment:  dry dressing    Patient Goal of Care:  [x] Wound Healing  [] Odor Control  [] Palliative Care  [x] Pain Control   [] Other:         PAST MEDICAL HISTORY        Diagnosis Date    Ambulatory dysfunction     walker for long distances, SOB with distance    Aortic stenosis     echo 2017    Arthritis     hands and hips    Asthma     Bilateral hilar adenopathy syndrome 6/3/2013    CAD (coronary artery disease)     Dr. Tequila Aleman [facility]) 2019    EF= 43%    CHF (congestive heart failure) ([address])     Chronic pain     COPD (chronic obstructive pulmonary disease) ([address])     pulmonology Dr. Wu Fajardo    Depression     Diabetes mellitus ([address])     borderline    Difficult intravenous access     Emphysema of lung ([address])     ESRD (end stage renal disease) on dialysis ([address])     MWF    Fear of needles     Gastric ulcer     GERD (gastroesophageal reflux disease)     Heart valve problem     bicuspic valve    Hemodialysis patient ([address])     History of spinal fracture     work incident    Hx of blood clots     Bilateral lower extremities; stents in place    Hyperlipidemia     Hypertension     MI (myocardial infarction) ([address]) 2019    has had 9 MIs. 2019 was the last

## 2023-09-18 NOTE — CARE COORDINATION
Chart reviewed. Patient off floor for HD. Lives in mobile home with spouse. He has home O2 with [employer]. He has most recently been to [facility] and did not want to go back. He is active with [facility]. He is on 4 liters continues O2 at home. He is ESRD and established HD at [facility] . He has PCP listed but spouse states he does not have a PCP. She states she is trying to get him in with her PCP office if they will accept him. However Carolee Portillo is listed and who is on file for signing home care orders. Left  for [facility]. She states patient did not miss HD he came into hospital because he could not breathe. She states she will provide a ride and bring O2 tank for transport home when he returns to the floor. Call placed to [facility] to see if they are still active Left VM. [facility] confirmed they are active and pulled orders from epic.

## 2023-09-19 ENCOUNTER — CARE COORDINATION (OUTPATIENT)
Dept: CASE MANAGEMENT | Age: 53
End: 2023-09-19

## 2023-09-19 NOTE — CARE COORDINATION
[facility] Care Transitions Initial Follow Up Call    Call within 2 business days of discharge: Yes    Patient: Klarissa Stanford Patient : 1970   MRN: [id number]  Reason for Admission: HTN urgency  PMH of essential hypertension, hyperlipidemia, type 2 diabetes mellitus, COPD, coronary artery disease, chronic systolic heart failure, A-fib, and ESRD. He presented on 9/15/2023 on account of shortness of breath after missing hemodialysis. Discharge Date: 23 RARS: Readmission Risk Score: 43.5      Last Discharge [address] [facility] [facility]       Date Complaint Diagnosis Description Type Department Provider    9/15/23 Shortness of Breath Hypertensive urgency . .. ED to Hosp-Admission (Discharged) ([facility]) Corrie Song MD; ... Attempted to reach patient via phone for initial post hospital transition call.  Saadia Francoisevangelist is full- no option to leave message    Care Transitions 24 Hour Call    Do you have all of your prescriptions and are they filled?: Yes  Do you have support at home?: Partner/Spouse/SO  Are you an active caregiver in your home?: No  Care Transitions Interventions                                 Follow Up  No future appointments.   Jam Sibley RN

## 2023-09-20 ENCOUNTER — CARE COORDINATION (OUTPATIENT)
Dept: CASE MANAGEMENT | Age: 53
End: 2023-09-20

## 2023-09-20 NOTE — CARE COORDINATION
appointment scheduling offered: Yes. Is follow up appointment scheduled within 7 days of discharge? No. No provider listed    Follow Up  No future appointments. Care Transition Nurse provided contact information. Plan for follow-up call in 3-5 days based on severity of symptoms and risk factors.   Plan for next call:  assist with getting provider established    Ina Gonzalez RN

## 2023-09-21 LAB
EKG ATRIAL RATE: 109 BPM
EKG ATRIAL RATE: 111 BPM
EKG DIAGNOSIS: NORMAL
EKG DIAGNOSIS: NORMAL
EKG P AXIS: 92 DEGREES
EKG P-R INTERVAL: 148 MS
EKG Q-T INTERVAL: 334 MS
EKG Q-T INTERVAL: 370 MS
EKG QRS DURATION: 104 MS
EKG QRS DURATION: 90 MS
EKG QTC CALCULATION (BAZETT): 454 MS
EKG QTC CALCULATION (BAZETT): 498 MS
EKG R AXIS: 41 DEGREES
EKG R AXIS: 6 DEGREES
EKG T AXIS: 113 DEGREES
EKG T AXIS: 78 DEGREES
EKG VENTRICULAR RATE: 109 BPM
EKG VENTRICULAR RATE: 111 BPM

## 2023-09-26 ENCOUNTER — CARE COORDINATION (OUTPATIENT)
Dept: CASE MANAGEMENT | Age: 53
End: 2023-09-26

## 2023-09-26 ENCOUNTER — HOSPITAL ENCOUNTER (INPATIENT)
Age: 53
LOS: 5 days | Discharge: HOME | DRG: 853 | End: 2023-10-02
Attending: EMERGENCY MEDICINE | Admitting: INTERNAL MEDICINE
Payer: MEDICARE

## 2023-09-26 DIAGNOSIS — J18.9 COMMUNITY ACQUIRED PNEUMONIA OF LEFT LOWER LOBE OF LUNG: Primary | ICD-10-CM

## 2023-09-26 DIAGNOSIS — R06.02 SHORTNESS OF BREATH: ICD-10-CM

## 2023-09-26 PROCEDURE — 99285 EMERGENCY DEPT VISIT HI MDM: CPT

## 2023-09-26 PROCEDURE — 96366 THER/PROPH/DIAG IV INF ADDON: CPT

## 2023-09-26 PROCEDURE — 94660 CPAP INITIATION&MGMT: CPT

## 2023-09-26 PROCEDURE — 93005 ELECTROCARDIOGRAM TRACING: CPT | Performed by: EMERGENCY MEDICINE

## 2023-09-26 PROCEDURE — 96365 THER/PROPH/DIAG IV INF INIT: CPT

## 2023-09-26 NOTE — CARE COORDINATION
[facility] Care Transitions Follow Up Call      Patient: Lakia Ann  Patient : 1970   MRN: [id number]  Reason for Admission: volume overload  PMH of essential hypertension, hyperlipidemia, type 2 diabetes mellitus, COPD, coronary artery disease, chronic systolic heart failure, A-fib, and ESRD. He presented on 9/15/2023 on account of shortness of breath after missing hemodialysis. Discharge Date: 23 RARS: Readmission Risk Score: 43.5      Attempted to reach patient via phone for transition call. VM left stating purpose of call along with my contact information requesting a return call. Follow Up  No future appointments.   Non-[facility] follow up appointment(s):      Care Transitions Subsequent and Final Call    Subsequent and Final Calls  Care Transitions Interventions                          Other Interventions:             Izzy White RN

## 2023-09-27 ENCOUNTER — APPOINTMENT (OUTPATIENT)
Dept: GENERAL RADIOLOGY | Age: 53
DRG: 853 | End: 2023-09-27
Payer: MEDICARE

## 2023-09-27 ENCOUNTER — CARE COORDINATION (OUTPATIENT)
Dept: CASE MANAGEMENT | Age: 53
End: 2023-09-27

## 2023-09-27 ENCOUNTER — TELEPHONE (OUTPATIENT)
Dept: ADMINISTRATIVE | Age: 53
End: 2023-09-27

## 2023-09-27 PROBLEM — A41.9 SEPSIS (HCC): Status: ACTIVE | Noted: 2023-09-27

## 2023-09-27 LAB
ANION GAP SERPL CALCULATED.3IONS-SCNC: 18 MMOL/L (ref 3–16)
ANION GAP SERPL CALCULATED.3IONS-SCNC: 19 MMOL/L (ref 3–16)
APTT BLD: 22.2 SEC (ref 22.7–35.9)
BASOPHILS # BLD: 0.1 K/UL (ref 0–0.2)
BASOPHILS NFR BLD: 0.8 %
BUN SERPL-MCNC: 53 MG/DL (ref 7–20)
BUN SERPL-MCNC: 61 MG/DL (ref 7–20)
CALCIUM SERPL-MCNC: 9.3 MG/DL (ref 8.3–10.6)
CALCIUM SERPL-MCNC: 9.9 MG/DL (ref 8.3–10.6)
CHLORIDE SERPL-SCNC: 86 MMOL/L (ref 99–110)
CHLORIDE SERPL-SCNC: 87 MMOL/L (ref 99–110)
CO2 SERPL-SCNC: 24 MMOL/L (ref 21–32)
CO2 SERPL-SCNC: 25 MMOL/L (ref 21–32)
CREAT SERPL-MCNC: 6.8 MG/DL (ref 0.9–1.3)
CREAT SERPL-MCNC: 6.8 MG/DL (ref 0.9–1.3)
DEPRECATED RDW RBC AUTO: 17.1 % (ref 12.4–15.4)
EKG ATRIAL RATE: 126 BPM
EKG DIAGNOSIS: NORMAL
EKG P AXIS: 68 DEGREES
EKG P-R INTERVAL: 148 MS
EKG Q-T INTERVAL: 306 MS
EKG QRS DURATION: 90 MS
EKG QTC CALCULATION (BAZETT): 443 MS
EKG R AXIS: 9 DEGREES
EKG T AXIS: 75 DEGREES
EKG VENTRICULAR RATE: 126 BPM
EOSINOPHIL # BLD: 0.1 K/UL (ref 0–0.6)
EOSINOPHIL NFR BLD: 1 %
GFR SERPLBLD CREATININE-BSD FMLA CKD-EPI: 9 ML/MIN/{1.73_M2}
GFR SERPLBLD CREATININE-BSD FMLA CKD-EPI: 9 ML/MIN/{1.73_M2}
GLUCOSE BLD-MCNC: 211 MG/DL (ref 70–99)
GLUCOSE SERPL-MCNC: 350 MG/DL (ref 70–99)
GLUCOSE SERPL-MCNC: 500 MG/DL (ref 70–99)
HCT VFR BLD AUTO: 33.3 % (ref 40.5–52.5)
HGB BLD-MCNC: 10.7 G/DL (ref 13.5–17.5)
INR PPP: 1.04 (ref 0.84–1.16)
LACTATE BLDV-SCNC: 1.9 MMOL/L (ref 0.4–1.9)
LACTATE BLDV-SCNC: 2.1 MMOL/L (ref 0.4–1.9)
LYMPHOCYTES # BLD: 0.4 K/UL (ref 1–5.1)
LYMPHOCYTES NFR BLD: 2.8 %
MAGNESIUM SERPL-MCNC: 2.1 MG/DL (ref 1.8–2.4)
MCH RBC QN AUTO: 29.5 PG (ref 26–34)
MCHC RBC AUTO-ENTMCNC: 32 G/DL (ref 31–36)
MCV RBC AUTO: 92.3 FL (ref 80–100)
MONOCYTES # BLD: 0.8 K/UL (ref 0–1.3)
MONOCYTES NFR BLD: 5.4 %
NEUTROPHILS # BLD: 13.4 K/UL (ref 1.7–7.7)
NEUTROPHILS NFR BLD: 90 %
PERFORMED ON: ABNORMAL
PLATELET # BLD AUTO: 206 K/UL (ref 135–450)
PMV BLD AUTO: 8.7 FL (ref 5–10.5)
POTASSIUM SERPL-SCNC: 5.6 MMOL/L (ref 3.5–5.1)
POTASSIUM SERPL-SCNC: 5.6 MMOL/L (ref 3.5–5.1)
PROCALCITONIN SERPL IA-MCNC: 0.67 NG/ML (ref 0–0.15)
PROTHROMBIN TIME: 13.6 SEC (ref 11.5–14.8)
RBC # BLD AUTO: 3.61 M/UL (ref 4.2–5.9)
SODIUM SERPL-SCNC: 129 MMOL/L (ref 136–145)
SODIUM SERPL-SCNC: 130 MMOL/L (ref 136–145)
TROPONIN, HIGH SENSITIVITY: 136 NG/L (ref 0–22)
TROPONIN, HIGH SENSITIVITY: 147 NG/L (ref 0–22)
WBC # BLD AUTO: 14.8 K/UL (ref 4–11)

## 2023-09-27 PROCEDURE — 90935 HEMODIALYSIS ONE EVALUATION: CPT

## 2023-09-27 PROCEDURE — 2060000000 HC ICU INTERMEDIATE R&B

## 2023-09-27 PROCEDURE — 1200000000 HC SEMI PRIVATE

## 2023-09-27 PROCEDURE — 6360000002 HC RX W HCPCS: Performed by: EMERGENCY MEDICINE

## 2023-09-27 PROCEDURE — 0JBQ0ZZ EXCISION OF RIGHT FOOT SUBCUTANEOUS TISSUE AND FASCIA, OPEN APPROACH: ICD-10-PCS | Performed by: INTERNAL MEDICINE

## 2023-09-27 PROCEDURE — 94761 N-INVAS EAR/PLS OXIMETRY MLT: CPT

## 2023-09-27 PROCEDURE — 71045 X-RAY EXAM CHEST 1 VIEW: CPT

## 2023-09-27 PROCEDURE — 6370000000 HC RX 637 (ALT 250 FOR IP): Performed by: INTERNAL MEDICINE

## 2023-09-27 PROCEDURE — 36415 COLL VENOUS BLD VENIPUNCTURE: CPT

## 2023-09-27 PROCEDURE — 5A1D70Z PERFORMANCE OF URINARY FILTRATION, INTERMITTENT, LESS THAN 6 HOURS PER DAY: ICD-10-PCS | Performed by: INTERNAL MEDICINE

## 2023-09-27 PROCEDURE — 0202U NFCT DS 22 TRGT SARS-COV-2: CPT

## 2023-09-27 PROCEDURE — 93010 ELECTROCARDIOGRAM REPORT: CPT | Performed by: INTERNAL MEDICINE

## 2023-09-27 PROCEDURE — 5A09457 ASSISTANCE WITH RESPIRATORY VENTILATION, 24-96 CONSECUTIVE HOURS, CONTINUOUS POSITIVE AIRWAY PRESSURE: ICD-10-PCS | Performed by: INTERNAL MEDICINE

## 2023-09-27 PROCEDURE — 85025 COMPLETE CBC W/AUTO DIFF WBC: CPT

## 2023-09-27 PROCEDURE — 85610 PROTHROMBIN TIME: CPT

## 2023-09-27 PROCEDURE — 84145 PROCALCITONIN (PCT): CPT

## 2023-09-27 PROCEDURE — 2580000003 HC RX 258: Performed by: NURSE PRACTITIONER

## 2023-09-27 PROCEDURE — 94640 AIRWAY INHALATION TREATMENT: CPT

## 2023-09-27 PROCEDURE — 2580000003 HC RX 258: Performed by: EMERGENCY MEDICINE

## 2023-09-27 PROCEDURE — 6370000000 HC RX 637 (ALT 250 FOR IP): Performed by: EMERGENCY MEDICINE

## 2023-09-27 PROCEDURE — 83605 ASSAY OF LACTIC ACID: CPT

## 2023-09-27 PROCEDURE — 99233 SBSQ HOSP IP/OBS HIGH 50: CPT | Performed by: INTERNAL MEDICINE

## 2023-09-27 PROCEDURE — 83735 ASSAY OF MAGNESIUM: CPT

## 2023-09-27 PROCEDURE — 85730 THROMBOPLASTIN TIME PARTIAL: CPT

## 2023-09-27 PROCEDURE — 87040 BLOOD CULTURE FOR BACTERIA: CPT

## 2023-09-27 PROCEDURE — 73630 X-RAY EXAM OF FOOT: CPT

## 2023-09-27 PROCEDURE — 2700000000 HC OXYGEN THERAPY PER DAY

## 2023-09-27 PROCEDURE — 84484 ASSAY OF TROPONIN QUANT: CPT

## 2023-09-27 PROCEDURE — 6360000002 HC RX W HCPCS

## 2023-09-27 PROCEDURE — 87449 NOS EACH ORGANISM AG IA: CPT

## 2023-09-27 PROCEDURE — 80048 BASIC METABOLIC PNL TOTAL CA: CPT

## 2023-09-27 PROCEDURE — 6360000002 HC RX W HCPCS: Performed by: NURSE PRACTITIONER

## 2023-09-27 PROCEDURE — 6370000000 HC RX 637 (ALT 250 FOR IP): Performed by: NURSE PRACTITIONER

## 2023-09-27 RX ORDER — PRAVASTATIN SODIUM 40 MG
40 TABLET ORAL DAILY
Status: DISCONTINUED | OUTPATIENT
Start: 2023-09-27 | End: 2023-10-02 | Stop reason: HOSPADM

## 2023-09-27 RX ORDER — HEPARIN SODIUM 1000 [USP'U]/ML
INJECTION, SOLUTION INTRAVENOUS; SUBCUTANEOUS
Status: COMPLETED
Start: 2023-09-27 | End: 2023-09-27

## 2023-09-27 RX ORDER — IPRATROPIUM BROMIDE AND ALBUTEROL SULFATE 2.5; .5 MG/3ML; MG/3ML
1 SOLUTION RESPIRATORY (INHALATION) EVERY 6 HOURS PRN
Status: DISCONTINUED | OUTPATIENT
Start: 2023-09-27 | End: 2023-09-28

## 2023-09-27 RX ORDER — SODIUM CHLORIDE 9 MG/ML
INJECTION, SOLUTION INTRAVENOUS PRN
Status: DISCONTINUED | OUTPATIENT
Start: 2023-09-27 | End: 2023-10-02 | Stop reason: HOSPADM

## 2023-09-27 RX ORDER — METOPROLOL SUCCINATE 50 MG/1
100 TABLET, EXTENDED RELEASE ORAL 2 TIMES DAILY
Status: DISCONTINUED | OUTPATIENT
Start: 2023-09-27 | End: 2023-10-02 | Stop reason: HOSPADM

## 2023-09-27 RX ORDER — CLOPIDOGREL BISULFATE 75 MG/1
75 TABLET ORAL DAILY
Status: DISCONTINUED | OUTPATIENT
Start: 2023-09-27 | End: 2023-10-02 | Stop reason: HOSPADM

## 2023-09-27 RX ORDER — INSULIN LISPRO 100 [IU]/ML
0-4 INJECTION, SOLUTION INTRAVENOUS; SUBCUTANEOUS
Status: DISCONTINUED | OUTPATIENT
Start: 2023-09-27 | End: 2023-10-02 | Stop reason: HOSPADM

## 2023-09-27 RX ORDER — DEXTROSE MONOHYDRATE 100 MG/ML
INJECTION, SOLUTION INTRAVENOUS CONTINUOUS PRN
Status: DISCONTINUED | OUTPATIENT
Start: 2023-09-27 | End: 2023-10-02 | Stop reason: HOSPADM

## 2023-09-27 RX ORDER — ACETYLCYSTEINE 200 MG/ML
600 SOLUTION ORAL; RESPIRATORY (INHALATION)
Status: DISCONTINUED | OUTPATIENT
Start: 2023-09-27 | End: 2023-10-02

## 2023-09-27 RX ORDER — PANTOPRAZOLE SODIUM 40 MG/1
40 TABLET, DELAYED RELEASE ORAL
Status: DISCONTINUED | OUTPATIENT
Start: 2023-09-27 | End: 2023-10-02 | Stop reason: HOSPADM

## 2023-09-27 RX ORDER — ISOSORBIDE DINITRATE 10 MG/1
20 TABLET ORAL 3 TIMES DAILY
Status: DISCONTINUED | OUTPATIENT
Start: 2023-09-27 | End: 2023-10-02 | Stop reason: HOSPADM

## 2023-09-27 RX ORDER — TORSEMIDE 20 MG/1
50 TABLET ORAL DAILY
Status: DISCONTINUED | OUTPATIENT
Start: 2023-09-27 | End: 2023-10-02 | Stop reason: HOSPADM

## 2023-09-27 RX ORDER — AZITHROMYCIN 250 MG/1
500 TABLET, FILM COATED ORAL ONCE
Status: COMPLETED | OUTPATIENT
Start: 2023-09-27 | End: 2023-09-27

## 2023-09-27 RX ORDER — INSULIN LISPRO 100 [IU]/ML
0-4 INJECTION, SOLUTION INTRAVENOUS; SUBCUTANEOUS NIGHTLY
Status: DISCONTINUED | OUTPATIENT
Start: 2023-09-27 | End: 2023-10-02 | Stop reason: HOSPADM

## 2023-09-27 RX ORDER — SODIUM CHLORIDE 0.9 % (FLUSH) 0.9 %
5-40 SYRINGE (ML) INJECTION PRN
Status: DISCONTINUED | OUTPATIENT
Start: 2023-09-27 | End: 2023-10-02 | Stop reason: HOSPADM

## 2023-09-27 RX ORDER — INSULIN GLARGINE 100 [IU]/ML
20 INJECTION, SOLUTION SUBCUTANEOUS NIGHTLY
Status: DISCONTINUED | OUTPATIENT
Start: 2023-09-27 | End: 2023-09-30

## 2023-09-27 RX ORDER — OXYCODONE HYDROCHLORIDE 5 MG/1
5 TABLET ORAL EVERY 4 HOURS PRN
Status: DISCONTINUED | OUTPATIENT
Start: 2023-09-27 | End: 2023-10-02 | Stop reason: HOSPADM

## 2023-09-27 RX ORDER — SODIUM CHLORIDE 0.9 % (FLUSH) 0.9 %
5-40 SYRINGE (ML) INJECTION EVERY 12 HOURS SCHEDULED
Status: DISCONTINUED | OUTPATIENT
Start: 2023-09-27 | End: 2023-10-02 | Stop reason: HOSPADM

## 2023-09-27 RX ORDER — HEPARIN SODIUM 1000 [USP'U]/ML
3600 INJECTION, SOLUTION INTRAVENOUS; SUBCUTANEOUS PRN
Status: DISCONTINUED | OUTPATIENT
Start: 2023-09-27 | End: 2023-10-02 | Stop reason: HOSPADM

## 2023-09-27 RX ORDER — INSULIN LISPRO 100 [IU]/ML
5 INJECTION, SOLUTION INTRAVENOUS; SUBCUTANEOUS
Status: DISCONTINUED | OUTPATIENT
Start: 2023-09-27 | End: 2023-09-30

## 2023-09-27 RX ORDER — QUETIAPINE FUMARATE 25 MG/1
50 TABLET, FILM COATED ORAL NIGHTLY
Status: DISCONTINUED | OUTPATIENT
Start: 2023-09-27 | End: 2023-10-02 | Stop reason: HOSPADM

## 2023-09-27 RX ORDER — ACETAMINOPHEN 325 MG/1
650 TABLET ORAL EVERY 6 HOURS PRN
Status: DISCONTINUED | OUTPATIENT
Start: 2023-09-27 | End: 2023-10-02 | Stop reason: HOSPADM

## 2023-09-27 RX ORDER — HEPARIN SODIUM 5000 [USP'U]/ML
5000 INJECTION, SOLUTION INTRAVENOUS; SUBCUTANEOUS EVERY 8 HOURS SCHEDULED
Status: DISCONTINUED | OUTPATIENT
Start: 2023-09-27 | End: 2023-09-27 | Stop reason: ALTCHOICE

## 2023-09-27 RX ORDER — TRAZODONE HYDROCHLORIDE 50 MG/1
50 TABLET ORAL DAILY
Status: DISCONTINUED | OUTPATIENT
Start: 2023-09-27 | End: 2023-10-02 | Stop reason: HOSPADM

## 2023-09-27 RX ORDER — ACETAMINOPHEN 650 MG/1
650 SUPPOSITORY RECTAL EVERY 6 HOURS PRN
Status: DISCONTINUED | OUTPATIENT
Start: 2023-09-27 | End: 2023-10-02 | Stop reason: HOSPADM

## 2023-09-27 RX ORDER — DULOXETIN HYDROCHLORIDE 60 MG/1
60 CAPSULE, DELAYED RELEASE ORAL DAILY
Status: DISCONTINUED | OUTPATIENT
Start: 2023-09-27 | End: 2023-10-02 | Stop reason: HOSPADM

## 2023-09-27 RX ADMIN — METOPROLOL SUCCINATE 100 MG: 50 TABLET, EXTENDED RELEASE ORAL at 09:06

## 2023-09-27 RX ADMIN — APIXABAN 5 MG: 5 TABLET, FILM COATED ORAL at 20:58

## 2023-09-27 RX ADMIN — PRAVASTATIN SODIUM 40 MG: 40 TABLET ORAL at 09:06

## 2023-09-27 RX ADMIN — AZITHROMYCIN MONOHYDRATE 500 MG: 500 INJECTION, POWDER, LYOPHILIZED, FOR SOLUTION INTRAVENOUS at 21:34

## 2023-09-27 RX ADMIN — ISOSORBIDE DINITRATE 20 MG: 20 TABLET ORAL at 20:58

## 2023-09-27 RX ADMIN — OXYCODONE HYDROCHLORIDE 5 MG: 5 TABLET ORAL at 15:35

## 2023-09-27 RX ADMIN — SODIUM CHLORIDE, PRESERVATIVE FREE 10 ML: 5 INJECTION INTRAVENOUS at 09:07

## 2023-09-27 RX ADMIN — AZITHROMYCIN 500 MG: 250 TABLET, FILM COATED ORAL at 01:38

## 2023-09-27 RX ADMIN — HEPARIN SODIUM 3600 UNITS: 1000 INJECTION INTRAVENOUS; SUBCUTANEOUS at 19:22

## 2023-09-27 RX ADMIN — TORSEMIDE 50 MG: 100 TABLET ORAL at 09:06

## 2023-09-27 RX ADMIN — TRAZODONE HYDROCHLORIDE 50 MG: 50 TABLET ORAL at 23:12

## 2023-09-27 RX ADMIN — DULOXETINE HYDROCHLORIDE 60 MG: 60 CAPSULE, DELAYED RELEASE ORAL at 09:06

## 2023-09-27 RX ADMIN — SODIUM CHLORIDE: 9 INJECTION, SOLUTION INTRAVENOUS at 21:00

## 2023-09-27 RX ADMIN — METOPROLOL SUCCINATE 100 MG: 50 TABLET, EXTENDED RELEASE ORAL at 20:58

## 2023-09-27 RX ADMIN — CEFTRIAXONE SODIUM 1000 MG: 1 INJECTION, POWDER, FOR SOLUTION INTRAMUSCULAR; INTRAVENOUS at 21:01

## 2023-09-27 RX ADMIN — SODIUM CHLORIDE, PRESERVATIVE FREE 10 ML: 5 INJECTION INTRAVENOUS at 20:59

## 2023-09-27 RX ADMIN — APIXABAN 5 MG: 5 TABLET, FILM COATED ORAL at 09:06

## 2023-09-27 RX ADMIN — PANTOPRAZOLE SODIUM 40 MG: 40 TABLET, DELAYED RELEASE ORAL at 09:06

## 2023-09-27 RX ADMIN — QUETIAPINE FUMARATE 50 MG: 25 TABLET ORAL at 23:12

## 2023-09-27 RX ADMIN — ISOSORBIDE DINITRATE 20 MG: 20 TABLET ORAL at 09:06

## 2023-09-27 RX ADMIN — CEFTRIAXONE SODIUM 2000 MG: 2 INJECTION, POWDER, FOR SOLUTION INTRAMUSCULAR; INTRAVENOUS at 01:30

## 2023-09-27 RX ADMIN — Medication 2 PUFF: at 22:00

## 2023-09-27 RX ADMIN — CLOPIDOGREL BISULFATE 75 MG: 75 TABLET ORAL at 09:06

## 2023-09-27 RX ADMIN — INSULIN GLARGINE 20 UNITS: 100 INJECTION, SOLUTION SUBCUTANEOUS at 20:59

## 2023-09-27 RX ADMIN — ISOSORBIDE DINITRATE 20 MG: 20 TABLET ORAL at 15:35

## 2023-09-27 ASSESSMENT — ENCOUNTER SYMPTOMS
EYE REDNESS: 0
RHINORRHEA: 0
DIARRHEA: 0
VOMITING: 0
ABDOMINAL PAIN: 0
SHORTNESS OF BREATH: 1
COUGH: 0
CONSTIPATION: 0
NAUSEA: 0
BACK PAIN: 0
EYE PAIN: 0

## 2023-09-27 ASSESSMENT — PAIN SCALES - GENERAL
PAINLEVEL_OUTOF10: 0

## 2023-09-27 ASSESSMENT — PULMONARY FUNCTION TESTS: PEFR_L/MIN: 20

## 2023-09-27 NOTE — ED NOTES
This writer took pt off of bipap and paced pt on 5L NC. Pts oxygen sats at 99%.  RN will continue to monitor      Mike Nagel RN  09/27/23 3519

## 2023-09-27 NOTE — ED NOTES
Mr. Torin Munroe is a 52 y.o. male who had concerns including Shortness of Breath. Chief Complaint   Patient presents with    Shortness of Breath       He is being admitted for:    Pneumonia    His ED problem list included:    1. Community acquired pneumonia of left lower lobe of lung    2.  Shortness of breath        Past Medical History:   Diagnosis Date    Ambulatory dysfunction     walker for long distances, SOB with distance    Aortic stenosis     echo 2017    Arthritis     hands and hips    Asthma     Bilateral hilar adenopathy syndrome 6/3/2013    CAD (coronary artery disease)     Dr. Freya Carrel [facility]) 04/19/2019    EF= 43%    CHF (congestive heart failure) ([address])     Chronic pain     COPD (chronic obstructive pulmonary disease) ([address])     pulmonology Dr. Maria Del Carmen Ham    Depression     Diabetes mellitus ([address])     borderline    Difficult intravenous access     Emphysema of lung ([address])     ESRD (end stage renal disease) on dialysis ([address])     MWF    Fear of needles     Gastric ulcer     GERD (gastroesophageal reflux disease)     Heart valve problem     bicuspic valve    Hemodialysis patient ([address])     History of spinal fracture     work incident    Hx of blood clots     Bilateral lower extremities; stents in place    Hyperlipidemia     Hypertension     MI (myocardial infarction) ([address]) 2019    has had 9 MIs. 2019 was the last    Neuromuscular disorder ([address])     due to CVA    Numbness and tingling in left arm     from fistula    Pneumonia     PONV (postoperative nausea and vomiting)     Prolonged emergence from general anesthesia     States requires more medication than most people    Sleep apnea     Uses CPAP    Stroke ([address])     7mm thalamic cva 2017 deficts left side, left side weakness    TIA (transient ischemic attack)     Unspecified diseases of blood and blood-forming organs        Past Surgical History:   Procedure Laterality Date    AORTIC VALVE REPLACEMENT N/A 10/15/2019    TRANSCATHETER

## 2023-09-27 NOTE — H&P
Jane Echeverria MD at [address] N/A 2/24/2022    PERINEAL ABCESS DRAINAGE performed by Jane Echeverria MD at [address] [facility] N/A 10/2/2022    THORACENTESIS ULTRASOUND performed by Jeni Hall MD at [address]  01/06/2016    UPPER GASTROINTESTINAL ENDOSCOPY  01/29/2017    possible candida, otherwise normal appearing    VASCULAR SURGERY  aprx 2 years ago    2 stents placed, each side of groin     Medications Prior to Admission:   Prior to Admission medications    Medication Sig Start Date End Date Taking?  Authorizing Provider   oxyCODONE (ROXICODONE) 5 MG immediate release tablet  7/28/23   Historical Provider, MD   apixaban (ELIQUIS) 5 MG TABS tablet Take 1 tablet by mouth 2 times daily 6/9/23 7/9/23  Yaneth Funes MD   traZODone (DESYREL) 50 MG tablet Take 1 tablet by mouth daily 4/16/23   Historical Provider, MD   midodrine (PROAMATINE) 10 MG tablet Take 1 tablet by mouth as needed (Give in dialysis now and prn for SBP <110.) 5/11/23 6/10/23  Yaneth Funes MD   DULoxetine (CYMBALTA) 60 MG extended release capsule Take 1 capsule by mouth daily 3/17/23   Antonio Arora MD   torsemide [facility]) 100 MG tablet  3/2/23   Historical Provider, MD   pravastatin (PRAVACHOL) 40 MG tablet TAKE 1 TABLET BY MOUTH DAILY 2/7/23   JAY Beavers - CNP   clopidogrel (PLAVIX) 75 MG tablet TAKE 1 TABLET BY MOUTH DAILY 2/7/23   JAY Beavers - ILAN   metoprolol succinate (TOPROL XL) 100 MG extended release tablet Take 1 tablet by mouth in the morning and at bedtime 7/21/22   Margarita Figueroa MD   QUEtiapine (SEROQUEL) 50 MG tablet TAKE 1 TABLET BY MOUTH EVERY EVENING 6/30/22   Noreen Peck MD   isosorbide dinitrate (ISORDIL) 20 MG tablet Take 1 tablet by mouth 3 times daily 6/8/22   JASE Ridley   pantoprazole (PROTONIX) 40 MG tablet TAKE 1 TABLET BY MOUTH EVERY MORNING BEFORE

## 2023-09-27 NOTE — ED PROVIDER NOTES
[address] [place] [address]  ED  EMERGENCY DEPARTMENT ENCOUNTER        Pt Name: Bere Anthony  MRN: [id number]  [facility] 1970  Date of evaluation: 9/26/2023  Provider: Sandeep Ricketts DO  PCP: Shane Pemberton MD  Note Started: 11:39 PM EDT 9/26/23    CHIEF COMPLAINT      SOB    HISTORY OF PRESENT ILLNESS: 1 or more Elements     Chief Complaint   Patient presents with    Shortness of Breath     History from : Patient  Limitations to history : None    Bere Anthony is a 52 y.o. male who presents to the emergency department secondary to concern for shortness of breath. Patient reports that he began feeling symptoms at 2 PM today, but significantly worsened throughout the evening. Patient has a history of stage renal disease and he is on dialysis Monday Wednesday and Fridays. Reports receiving full dialysis session yesterday. Denies any chest pain. Patient placed on BiPAP with significant relief in his symptoms on arrival.    Past medical history noted below. Aside from what is stated above denies any other symptoms or modifying factors. reports that he quit smoking about 3 years ago. His smoking use included cigarettes. He has a 16.50 pack-year smoking history. He has never used smokeless tobacco. He reports that he does not currently use alcohol. He reports that he does not use drugs. Nursing Notes were all reviewed and agreed with or any disagreements addressed in HPI/MDM. REVIEW OF SYSTEMS :    Review of Systems   Constitutional:  Negative for chills and fever. HENT:  Negative for congestion and rhinorrhea. Eyes:  Negative for pain and redness. Respiratory:  Positive for shortness of breath. Negative for cough. Cardiovascular:  Negative for chest pain and leg swelling. Gastrointestinal:  Negative for abdominal pain, constipation, diarrhea, nausea and vomiting. Genitourinary:  Negative for frequency and urgency. Musculoskeletal:  Negative for back pain and neck pain.    Skin:

## 2023-09-27 NOTE — ED NOTES
Pt stated he was having a hard time catching his breath. Pts vitals were stable. This writer placed pt back on his bipap.  Pts oxygen sats currently 100%     Renae Álvarez RN  09/27/23 9879

## 2023-09-27 NOTE — TELEPHONE ENCOUNTER
Writer contacted ED provider to inform of 30 day readmission risk. ED provider informed writer of readmission. Call Back: If you need to call back to inform of disposition you can contact me at [phone]     Attending physician:     Lucia Ramirez DO

## 2023-09-27 NOTE — ED NOTES
Pt not very tolerable of vitals and keeps removing blood pressure cuff himself     Mariposa Marino RN  09/27/23 9142

## 2023-09-27 NOTE — CARE COORDINATION
Case Management Assessment  Initial Evaluation    Date/Time of Evaluation: 9/27/2023 4:29 PM  Assessment Completed by: Isha Castillo RN    If patient is discharged prior to next notation, then this note serves as note for discharge by case management. Patient Name: Nika Goodman                   YOB: 1970  Diagnosis: Shortness of breath [R06.02]  Sepsis ([address]) [A41.9]  Community acquired pneumonia of left lower lobe of lung [J18.9]                   Date / Time: 9/26/2023 11:39 PM    Patient Admission Status: Inpatient   Readmission Risk (Low < 19, Mod (19-27), High > 27): Readmission Risk Score: 43.5    Current PCP: Elly Swift MD  PCP verified by CM? Yes    Chart Reviewed: Yes      History Provided by: Patient  Patient Orientation: Alert and Oriented    Patient Cognition: Alert    Hospitalization in the last 30 days (Readmission):  Yes    If yes, Readmission Assessment in CM Navigator will be completed. Advance Directives:      Code Status: Full Code   Patient's Primary Decision Maker is: Patient Declined (Legal Next of Kin Remains as Decision Maker)    Primary Decision Maker: [address] [place] [address] Road [phone]    Secondary Decision Maker: Ratna Rivasam  Brother/Sister  [phone]    Discharge Planning:    Patient lives with: Spouse/Significant Other Type of Home: Trailer/Mobile Home  Primary Care Giver: Self  Patient Support Systems include: Spouse/Significant Other, Children   Current Financial resources: Medicaid, Medicare  Current community resources: Transportation (Medicaid transport [facility] states they are frequently late for transport and he misses HD.)  Current services prior to admission: C-pap, Oxygen Therapy (provider [facility] wants BIPAP oredered before discharge. Waiting for supporting documentation to present to [facility].  Destin Beavers is aware)            Current DME: Walker            Type of Home Care services:

## 2023-09-27 NOTE — CARE COORDINATION
Upon chart review, patient noted to be readmitted. CTN placed call to ER Angela LONG and left message in regards to management plan. CTN to continue to monitor.   Ji Fowler, RN   [phone]

## 2023-09-28 ENCOUNTER — CARE COORDINATION (OUTPATIENT)
Dept: CASE MANAGEMENT | Age: 53
End: 2023-09-28

## 2023-09-28 DIAGNOSIS — G47.33 OSA (OBSTRUCTIVE SLEEP APNEA): Primary | ICD-10-CM

## 2023-09-28 DIAGNOSIS — E66.2 OBESITY HYPOVENTILATION SYNDROME (HCC): ICD-10-CM

## 2023-09-28 LAB
ANION GAP SERPL CALCULATED.3IONS-SCNC: 15 MMOL/L (ref 3–16)
BUN SERPL-MCNC: 47 MG/DL (ref 7–20)
CALCIUM SERPL-MCNC: 9.5 MG/DL (ref 8.3–10.6)
CHLORIDE SERPL-SCNC: 91 MMOL/L (ref 99–110)
CO2 SERPL-SCNC: 24 MMOL/L (ref 21–32)
CREAT SERPL-MCNC: 5 MG/DL (ref 0.9–1.3)
GFR SERPLBLD CREATININE-BSD FMLA CKD-EPI: 13 ML/MIN/{1.73_M2}
GLUCOSE BLD-MCNC: 136 MG/DL (ref 70–99)
GLUCOSE BLD-MCNC: 201 MG/DL (ref 70–99)
GLUCOSE BLD-MCNC: 227 MG/DL (ref 70–99)
GLUCOSE BLD-MCNC: 278 MG/DL (ref 70–99)
GLUCOSE SERPL-MCNC: 323 MG/DL (ref 70–99)
LEGIONELLA AG UR QL: NORMAL
PERFORMED ON: ABNORMAL
POTASSIUM SERPL-SCNC: 4.4 MMOL/L (ref 3.5–5.1)
REPORT: NORMAL
RESP PATH DNA+RNA PNL NPH NAA+NON-PROBE: NORMAL
S PNEUM AG UR QL: NORMAL
SODIUM SERPL-SCNC: 130 MMOL/L (ref 136–145)

## 2023-09-28 PROCEDURE — 6370000000 HC RX 637 (ALT 250 FOR IP): Performed by: NURSE PRACTITIONER

## 2023-09-28 PROCEDURE — 6360000002 HC RX W HCPCS: Performed by: NURSE PRACTITIONER

## 2023-09-28 PROCEDURE — 80048 BASIC METABOLIC PNL TOTAL CA: CPT

## 2023-09-28 PROCEDURE — 6370000000 HC RX 637 (ALT 250 FOR IP): Performed by: INTERNAL MEDICINE

## 2023-09-28 PROCEDURE — 1200000000 HC SEMI PRIVATE

## 2023-09-28 PROCEDURE — 99232 SBSQ HOSP IP/OBS MODERATE 35: CPT | Performed by: INTERNAL MEDICINE

## 2023-09-28 PROCEDURE — 2700000000 HC OXYGEN THERAPY PER DAY

## 2023-09-28 PROCEDURE — 2580000003 HC RX 258: Performed by: NURSE PRACTITIONER

## 2023-09-28 PROCEDURE — 94669 MECHANICAL CHEST WALL OSCILL: CPT

## 2023-09-28 PROCEDURE — 94761 N-INVAS EAR/PLS OXIMETRY MLT: CPT

## 2023-09-28 PROCEDURE — 2060000000 HC ICU INTERMEDIATE R&B

## 2023-09-28 PROCEDURE — 94640 AIRWAY INHALATION TREATMENT: CPT

## 2023-09-28 PROCEDURE — 6360000002 HC RX W HCPCS: Performed by: INTERNAL MEDICINE

## 2023-09-28 RX ORDER — HEPARIN SODIUM 1000 [USP'U]/ML
INJECTION, SOLUTION INTRAVENOUS; SUBCUTANEOUS
Status: DISPENSED
Start: 2023-09-28 | End: 2023-09-28

## 2023-09-28 RX ORDER — IPRATROPIUM BROMIDE AND ALBUTEROL SULFATE 2.5; .5 MG/3ML; MG/3ML
1 SOLUTION RESPIRATORY (INHALATION)
Status: DISCONTINUED | OUTPATIENT
Start: 2023-09-28 | End: 2023-10-02 | Stop reason: HOSPADM

## 2023-09-28 RX ADMIN — CLOPIDOGREL BISULFATE 75 MG: 75 TABLET ORAL at 09:29

## 2023-09-28 RX ADMIN — DULOXETINE HYDROCHLORIDE 60 MG: 60 CAPSULE, DELAYED RELEASE ORAL at 09:27

## 2023-09-28 RX ADMIN — TRAZODONE HYDROCHLORIDE 50 MG: 50 TABLET ORAL at 23:35

## 2023-09-28 RX ADMIN — SODIUM CHLORIDE, PRESERVATIVE FREE 10 ML: 5 INJECTION INTRAVENOUS at 09:29

## 2023-09-28 RX ADMIN — METOPROLOL SUCCINATE 100 MG: 50 TABLET, EXTENDED RELEASE ORAL at 21:43

## 2023-09-28 RX ADMIN — METOPROLOL SUCCINATE 100 MG: 50 TABLET, EXTENDED RELEASE ORAL at 09:27

## 2023-09-28 RX ADMIN — QUETIAPINE FUMARATE 50 MG: 25 TABLET ORAL at 23:35

## 2023-09-28 RX ADMIN — IPRATROPIUM BROMIDE AND ALBUTEROL SULFATE 1 DOSE: 2.5; .5 SOLUTION RESPIRATORY (INHALATION) at 12:23

## 2023-09-28 RX ADMIN — ISOSORBIDE DINITRATE 20 MG: 20 TABLET ORAL at 21:44

## 2023-09-28 RX ADMIN — ACETYLCYSTEINE 600 MG: 200 SOLUTION ORAL; RESPIRATORY (INHALATION) at 20:52

## 2023-09-28 RX ADMIN — IPRATROPIUM BROMIDE AND ALBUTEROL SULFATE 1 DOSE: 2.5; .5 SOLUTION RESPIRATORY (INHALATION) at 09:04

## 2023-09-28 RX ADMIN — INSULIN LISPRO 2 UNITS: 100 INJECTION, SOLUTION INTRAVENOUS; SUBCUTANEOUS at 09:27

## 2023-09-28 RX ADMIN — Medication 2 PUFF: at 20:54

## 2023-09-28 RX ADMIN — INSULIN GLARGINE 20 UNITS: 100 INJECTION, SOLUTION SUBCUTANEOUS at 21:44

## 2023-09-28 RX ADMIN — SODIUM CHLORIDE, PRESERVATIVE FREE 10 ML: 5 INJECTION INTRAVENOUS at 21:44

## 2023-09-28 RX ADMIN — PANTOPRAZOLE SODIUM 40 MG: 40 TABLET, DELAYED RELEASE ORAL at 06:20

## 2023-09-28 RX ADMIN — IPRATROPIUM BROMIDE AND ALBUTEROL SULFATE 1 DOSE: 2.5; .5 SOLUTION RESPIRATORY (INHALATION) at 20:52

## 2023-09-28 RX ADMIN — AZITHROMYCIN MONOHYDRATE 500 MG: 500 INJECTION, POWDER, LYOPHILIZED, FOR SOLUTION INTRAVENOUS at 22:40

## 2023-09-28 RX ADMIN — INSULIN LISPRO 5 UNITS: 100 INJECTION, SOLUTION INTRAVENOUS; SUBCUTANEOUS at 09:28

## 2023-09-28 RX ADMIN — CEFTRIAXONE SODIUM 1000 MG: 1 INJECTION, POWDER, FOR SOLUTION INTRAMUSCULAR; INTRAVENOUS at 21:42

## 2023-09-28 RX ADMIN — TORSEMIDE 50 MG: 100 TABLET ORAL at 09:27

## 2023-09-28 RX ADMIN — Medication 2 PUFF: at 09:04

## 2023-09-28 RX ADMIN — ISOSORBIDE DINITRATE 20 MG: 20 TABLET ORAL at 09:27

## 2023-09-28 RX ADMIN — IPRATROPIUM BROMIDE AND ALBUTEROL SULFATE 1 DOSE: 2.5; .5 SOLUTION RESPIRATORY (INHALATION) at 15:38

## 2023-09-28 RX ADMIN — ACETYLCYSTEINE 600 MG: 200 SOLUTION ORAL; RESPIRATORY (INHALATION) at 09:04

## 2023-09-28 RX ADMIN — APIXABAN 5 MG: 5 TABLET, FILM COATED ORAL at 21:43

## 2023-09-28 RX ADMIN — APIXABAN 5 MG: 5 TABLET, FILM COATED ORAL at 09:27

## 2023-09-28 RX ADMIN — PRAVASTATIN SODIUM 40 MG: 40 TABLET ORAL at 09:27

## 2023-09-28 ASSESSMENT — PAIN SCALES - GENERAL
PAINLEVEL_OUTOF10: 0

## 2023-09-28 NOTE — CARE COORDINATION
[facility] CTN discussed with CM that patient currently has no PCP. CM will make referral to [facility].      Emilia RALPH, RN, [facility]  Care Transition Nurse  [phone] mobile

## 2023-09-28 NOTE — CARE COORDINATION
CTN contacted C4 CM [facility] and LVM at [phone]. Patient doesn't have PCP. LVM. CTN contacted CM ER Yvonne Rushing and discussed patient. CTN will follow back up with C4 CM. Patient has Management Plan at this time.      Marielle NELSONN, RN, [facility]  Care Transition Nurse  [phone] mobile

## 2023-09-28 NOTE — CARE COORDINATION
Received VM from [facility], CTKASANDRA to call her about Pt. Called her back and she states that he does not have a PCP. Pt did have medical house calls, but states that \" they are not coming back to my house\" per Mirta Figueroa RN. This RN CM will make referral to [facility] clinic. Brochure given to Pt and office contact info added to AVS.     Pt will need new home Bipap set up. [facility]. Call placed to [facility] with information and she will call back once she pulls Pt information.

## 2023-09-28 NOTE — DISCHARGE INSTRUCTIONS
Podiatry      Cleanse right foot wounds with saline. Pat dry and then apply santyl to lateral foot wounds daily. Cover with dry dressing    (Do not apply santyl to heel).  Apply betadine and dsd to heel ulcer daily

## 2023-09-29 ENCOUNTER — APPOINTMENT (OUTPATIENT)
Dept: INTERVENTIONAL RADIOLOGY/VASCULAR | Age: 53
DRG: 853 | End: 2023-09-29
Payer: MEDICARE

## 2023-09-29 LAB
GLUCOSE BLD-MCNC: 167 MG/DL (ref 70–99)
GLUCOSE BLD-MCNC: 204 MG/DL (ref 70–99)
GLUCOSE BLD-MCNC: 95 MG/DL (ref 70–99)
PERFORMED ON: ABNORMAL
PERFORMED ON: ABNORMAL
PERFORMED ON: NORMAL

## 2023-09-29 PROCEDURE — 6360000002 HC RX W HCPCS: Performed by: INTERNAL MEDICINE

## 2023-09-29 PROCEDURE — 2700000000 HC OXYGEN THERAPY PER DAY

## 2023-09-29 PROCEDURE — 6370000000 HC RX 637 (ALT 250 FOR IP): Performed by: NURSE PRACTITIONER

## 2023-09-29 PROCEDURE — 90935 HEMODIALYSIS ONE EVALUATION: CPT

## 2023-09-29 PROCEDURE — 94761 N-INVAS EAR/PLS OXIMETRY MLT: CPT

## 2023-09-29 PROCEDURE — 2060000000 HC ICU INTERMEDIATE R&B

## 2023-09-29 PROCEDURE — 94640 AIRWAY INHALATION TREATMENT: CPT

## 2023-09-29 PROCEDURE — 6370000000 HC RX 637 (ALT 250 FOR IP): Performed by: INTERNAL MEDICINE

## 2023-09-29 PROCEDURE — 6360000002 HC RX W HCPCS: Performed by: NURSE PRACTITIONER

## 2023-09-29 PROCEDURE — 94669 MECHANICAL CHEST WALL OSCILL: CPT

## 2023-09-29 PROCEDURE — 2580000003 HC RX 258: Performed by: NURSE PRACTITIONER

## 2023-09-29 PROCEDURE — 94660 CPAP INITIATION&MGMT: CPT

## 2023-09-29 PROCEDURE — C1769 GUIDE WIRE: HCPCS

## 2023-09-29 PROCEDURE — 1200000000 HC SEMI PRIVATE

## 2023-09-29 RX ORDER — HEPARIN SODIUM 1000 [USP'U]/ML
INJECTION, SOLUTION INTRAVENOUS; SUBCUTANEOUS
Status: DISPENSED
Start: 2023-09-29 | End: 2023-09-29

## 2023-09-29 RX ADMIN — OXYCODONE HYDROCHLORIDE 5 MG: 5 TABLET ORAL at 20:58

## 2023-09-29 RX ADMIN — DULOXETINE HYDROCHLORIDE 60 MG: 60 CAPSULE, DELAYED RELEASE ORAL at 09:04

## 2023-09-29 RX ADMIN — PANTOPRAZOLE SODIUM 40 MG: 40 TABLET, DELAYED RELEASE ORAL at 05:06

## 2023-09-29 RX ADMIN — ACETYLCYSTEINE 600 MG: 200 SOLUTION ORAL; RESPIRATORY (INHALATION) at 08:33

## 2023-09-29 RX ADMIN — IPRATROPIUM BROMIDE AND ALBUTEROL SULFATE 1 DOSE: 2.5; .5 SOLUTION RESPIRATORY (INHALATION) at 08:32

## 2023-09-29 RX ADMIN — HEPARIN SODIUM 3600 UNITS: 1000 INJECTION INTRAVENOUS; SUBCUTANEOUS at 12:13

## 2023-09-29 RX ADMIN — APIXABAN 5 MG: 5 TABLET, FILM COATED ORAL at 20:57

## 2023-09-29 RX ADMIN — AZITHROMYCIN MONOHYDRATE 500 MG: 500 INJECTION, POWDER, LYOPHILIZED, FOR SOLUTION INTRAVENOUS at 20:57

## 2023-09-29 RX ADMIN — CEFTRIAXONE SODIUM 1000 MG: 1 INJECTION, POWDER, FOR SOLUTION INTRAMUSCULAR; INTRAVENOUS at 22:40

## 2023-09-29 RX ADMIN — SODIUM CHLORIDE, PRESERVATIVE FREE 10 ML: 5 INJECTION INTRAVENOUS at 20:58

## 2023-09-29 RX ADMIN — ISOSORBIDE DINITRATE 20 MG: 20 TABLET ORAL at 15:34

## 2023-09-29 RX ADMIN — APIXABAN 5 MG: 5 TABLET, FILM COATED ORAL at 09:04

## 2023-09-29 RX ADMIN — SODIUM CHLORIDE, PRESERVATIVE FREE 5 ML: 5 INJECTION INTRAVENOUS at 09:23

## 2023-09-29 RX ADMIN — ACETYLCYSTEINE 600 MG: 200 SOLUTION ORAL; RESPIRATORY (INHALATION) at 20:18

## 2023-09-29 RX ADMIN — METOPROLOL SUCCINATE 100 MG: 50 TABLET, EXTENDED RELEASE ORAL at 20:57

## 2023-09-29 RX ADMIN — ISOSORBIDE DINITRATE 20 MG: 20 TABLET ORAL at 20:58

## 2023-09-29 RX ADMIN — IPRATROPIUM BROMIDE AND ALBUTEROL SULFATE 1 DOSE: 2.5; .5 SOLUTION RESPIRATORY (INHALATION) at 15:58

## 2023-09-29 RX ADMIN — INSULIN LISPRO 5 UNITS: 100 INJECTION, SOLUTION INTRAVENOUS; SUBCUTANEOUS at 09:18

## 2023-09-29 RX ADMIN — CLOPIDOGREL BISULFATE 75 MG: 75 TABLET ORAL at 09:04

## 2023-09-29 RX ADMIN — TRAZODONE HYDROCHLORIDE 50 MG: 50 TABLET ORAL at 22:30

## 2023-09-29 RX ADMIN — QUETIAPINE FUMARATE 50 MG: 25 TABLET ORAL at 20:58

## 2023-09-29 RX ADMIN — IPRATROPIUM BROMIDE AND ALBUTEROL SULFATE 1 DOSE: 2.5; .5 SOLUTION RESPIRATORY (INHALATION) at 20:18

## 2023-09-29 RX ADMIN — INSULIN GLARGINE 20 UNITS: 100 INJECTION, SOLUTION SUBCUTANEOUS at 20:58

## 2023-09-29 RX ADMIN — Medication 2 PUFF: at 20:27

## 2023-09-29 RX ADMIN — PRAVASTATIN SODIUM 40 MG: 40 TABLET ORAL at 09:03

## 2023-09-29 RX ADMIN — Medication 2 PUFF: at 08:33

## 2023-09-29 NOTE — DIALYSIS
Treatment time: 3.5 hours  Net UF: 2300 ml     Pre weight: 99.3 kg  Post weight:97 kg  EDW: 97 kg  Crit Line Used: yes Ending Profile: b  Refill Present: no    Access used: L TDC    Access function: gppd with  ml/min     Medications or blood products given: none     Regular outpatient schedule: chang way     Summary of response to treatment: Patient tolerated treatment well and without any complications. Patient remained stable throughout entire treatment and upon the patient exiting the diaysis suite via transport. Report given to Tim Gar RN and copy of dialysis treatment record placed in chart, to be scanned into EMR.

## 2023-09-29 NOTE — CARE COORDINATION
Chart reviewed. Call placed to [facility] with Alyce about Pt needing home Bipap delivered. Message left for call back. Pt BELEN in dialysis today. Was refusing labs this am per nursing. Psych consult placed per MD for capacity eval. Plan remains for Pt to return home at d/c. Will follow.

## 2023-09-30 LAB
GLUCOSE BLD-MCNC: 114 MG/DL (ref 70–99)
GLUCOSE BLD-MCNC: 187 MG/DL (ref 70–99)
GLUCOSE BLD-MCNC: 202 MG/DL (ref 70–99)
GLUCOSE BLD-MCNC: 84 MG/DL (ref 70–99)
PERFORMED ON: ABNORMAL
PERFORMED ON: NORMAL

## 2023-09-30 PROCEDURE — 6360000002 HC RX W HCPCS: Performed by: NURSE PRACTITIONER

## 2023-09-30 PROCEDURE — 94660 CPAP INITIATION&MGMT: CPT

## 2023-09-30 PROCEDURE — 94640 AIRWAY INHALATION TREATMENT: CPT

## 2023-09-30 PROCEDURE — 6370000000 HC RX 637 (ALT 250 FOR IP): Performed by: INTERNAL MEDICINE

## 2023-09-30 PROCEDURE — 6370000000 HC RX 637 (ALT 250 FOR IP): Performed by: NURSE PRACTITIONER

## 2023-09-30 PROCEDURE — 94761 N-INVAS EAR/PLS OXIMETRY MLT: CPT

## 2023-09-30 PROCEDURE — 1200000000 HC SEMI PRIVATE

## 2023-09-30 PROCEDURE — 94669 MECHANICAL CHEST WALL OSCILL: CPT

## 2023-09-30 PROCEDURE — 2700000000 HC OXYGEN THERAPY PER DAY

## 2023-09-30 PROCEDURE — 2580000003 HC RX 258: Performed by: NURSE PRACTITIONER

## 2023-09-30 RX ORDER — INSULIN GLARGINE 100 [IU]/ML
15 INJECTION, SOLUTION SUBCUTANEOUS NIGHTLY
Status: DISCONTINUED | OUTPATIENT
Start: 2023-09-30 | End: 2023-10-02 | Stop reason: HOSPADM

## 2023-09-30 RX ORDER — INSULIN LISPRO 100 [IU]/ML
4 INJECTION, SOLUTION INTRAVENOUS; SUBCUTANEOUS
Status: DISCONTINUED | OUTPATIENT
Start: 2023-09-30 | End: 2023-10-02

## 2023-09-30 RX ADMIN — ISOSORBIDE DINITRATE 20 MG: 20 TABLET ORAL at 15:32

## 2023-09-30 RX ADMIN — SODIUM CHLORIDE, PRESERVATIVE FREE 10 ML: 5 INJECTION INTRAVENOUS at 20:50

## 2023-09-30 RX ADMIN — INSULIN LISPRO 4 UNITS: 100 INJECTION, SOLUTION INTRAVENOUS; SUBCUTANEOUS at 17:05

## 2023-09-30 RX ADMIN — APIXABAN 5 MG: 5 TABLET, FILM COATED ORAL at 08:21

## 2023-09-30 RX ADMIN — METOPROLOL SUCCINATE 100 MG: 50 TABLET, EXTENDED RELEASE ORAL at 20:37

## 2023-09-30 RX ADMIN — APIXABAN 5 MG: 5 TABLET, FILM COATED ORAL at 20:37

## 2023-09-30 RX ADMIN — SODIUM CHLORIDE, PRESERVATIVE FREE 10 ML: 5 INJECTION INTRAVENOUS at 08:22

## 2023-09-30 RX ADMIN — IPRATROPIUM BROMIDE AND ALBUTEROL SULFATE 1 DOSE: 2.5; .5 SOLUTION RESPIRATORY (INHALATION) at 11:45

## 2023-09-30 RX ADMIN — ISOSORBIDE DINITRATE 20 MG: 20 TABLET ORAL at 20:37

## 2023-09-30 RX ADMIN — DULOXETINE HYDROCHLORIDE 60 MG: 60 CAPSULE, DELAYED RELEASE ORAL at 08:21

## 2023-09-30 RX ADMIN — INSULIN GLARGINE 15 UNITS: 100 INJECTION, SOLUTION SUBCUTANEOUS at 20:37

## 2023-09-30 RX ADMIN — CEFTRIAXONE SODIUM 1000 MG: 1 INJECTION, POWDER, FOR SOLUTION INTRAMUSCULAR; INTRAVENOUS at 20:46

## 2023-09-30 RX ADMIN — PRAVASTATIN SODIUM 40 MG: 40 TABLET ORAL at 08:22

## 2023-09-30 RX ADMIN — CLOPIDOGREL BISULFATE 75 MG: 75 TABLET ORAL at 08:22

## 2023-09-30 RX ADMIN — IPRATROPIUM BROMIDE AND ALBUTEROL SULFATE 1 DOSE: 2.5; .5 SOLUTION RESPIRATORY (INHALATION) at 16:03

## 2023-09-30 RX ADMIN — OXYCODONE HYDROCHLORIDE 5 MG: 5 TABLET ORAL at 05:59

## 2023-09-30 RX ADMIN — ISOSORBIDE DINITRATE 20 MG: 20 TABLET ORAL at 08:21

## 2023-09-30 RX ADMIN — INSULIN LISPRO 4 UNITS: 100 INJECTION, SOLUTION INTRAVENOUS; SUBCUTANEOUS at 12:12

## 2023-09-30 RX ADMIN — ACETYLCYSTEINE 600 MG: 200 SOLUTION ORAL; RESPIRATORY (INHALATION) at 08:13

## 2023-09-30 RX ADMIN — METOPROLOL SUCCINATE 100 MG: 50 TABLET, EXTENDED RELEASE ORAL at 08:21

## 2023-09-30 RX ADMIN — TRAZODONE HYDROCHLORIDE 50 MG: 50 TABLET ORAL at 20:37

## 2023-09-30 RX ADMIN — Medication 2 PUFF: at 08:13

## 2023-09-30 RX ADMIN — TORSEMIDE 50 MG: 100 TABLET ORAL at 08:22

## 2023-09-30 RX ADMIN — INSULIN LISPRO 5 UNITS: 100 INJECTION, SOLUTION INTRAVENOUS; SUBCUTANEOUS at 08:22

## 2023-09-30 RX ADMIN — PANTOPRAZOLE SODIUM 40 MG: 40 TABLET, DELAYED RELEASE ORAL at 05:59

## 2023-09-30 RX ADMIN — IPRATROPIUM BROMIDE AND ALBUTEROL SULFATE 1 DOSE: 2.5; .5 SOLUTION RESPIRATORY (INHALATION) at 08:12

## 2023-09-30 RX ADMIN — QUETIAPINE FUMARATE 50 MG: 25 TABLET ORAL at 20:37

## 2023-09-30 ASSESSMENT — PAIN SCALES - GENERAL
PAINLEVEL_OUTOF10: 8
PAINLEVEL_OUTOF10: 0

## 2023-09-30 NOTE — PLAN OF CARE
Problem: Discharge Planning  Goal: Discharge to home or other facility with appropriate resources  Outcome: Progressing  Flowsheets (Taken 9/30/2023 0039)  Discharge to home or other facility with appropriate resources:   Arrange for needed discharge resources and transportation as appropriate   Identify barriers to discharge with patient and caregiver     Problem: Safety - Adult  Goal: Free from fall injury  Outcome: Progressing  Flowsheets (Taken 9/30/2023 0039)  Free From Fall Injury: Instruct family/caregiver on patient safety  Note: Patient has remained free from falls and physical injury  this shift with standard safety measures in place. Problem: Chronic Conditions and Co-morbidities  Goal: Patient's chronic conditions and co-morbidity symptoms are monitored and maintained or improved  Outcome: Progressing  Flowsheets (Taken 9/30/2023 0039)  Care Plan - Patient's Chronic Conditions and Co-Morbidity Symptoms are Monitored and Maintained or Improved: Monitor and assess patient's chronic conditions and comorbid symptoms for stability, deterioration, or improvement  Note: Chronic conditions are being monitored and treated per orders. Problem: Pain  Goal: Verbalizes/displays adequate comfort level or baseline comfort level  Outcome: Progressing  Flowsheets (Taken 9/30/2023 0039)  Verbalizes/displays adequate comfort level or baseline comfort level:   Encourage patient to monitor pain and request assistance   Assess pain using appropriate pain scale   Administer analgesics based on type and severity of pain and evaluate response   Implement non-pharmacological measures as appropriate and evaluate response  Note: Patient reported pain, received medication and is now resting comfortably.        Problem: Metabolic/Fluid and Electrolytes - Adult  Goal: Glucose maintained within prescribed range  Outcome: Progressing  Flowsheets (Taken 9/30/2023 0039)  Glucose maintained within prescribed range:   Monitor

## 2023-10-01 LAB
BACTERIA BLD CULT ORG #2: NORMAL
BACTERIA BLD CULT: NORMAL
GLUCOSE BLD-MCNC: 124 MG/DL (ref 70–99)
GLUCOSE BLD-MCNC: 136 MG/DL (ref 70–99)
GLUCOSE BLD-MCNC: 169 MG/DL (ref 70–99)
GLUCOSE BLD-MCNC: 75 MG/DL (ref 70–99)
PERFORMED ON: ABNORMAL
PERFORMED ON: NORMAL

## 2023-10-01 PROCEDURE — 2580000003 HC RX 258: Performed by: NURSE PRACTITIONER

## 2023-10-01 PROCEDURE — 94660 CPAP INITIATION&MGMT: CPT

## 2023-10-01 PROCEDURE — 94640 AIRWAY INHALATION TREATMENT: CPT

## 2023-10-01 PROCEDURE — 6370000000 HC RX 637 (ALT 250 FOR IP): Performed by: INTERNAL MEDICINE

## 2023-10-01 PROCEDURE — 99231 SBSQ HOSP IP/OBS SF/LOW 25: CPT | Performed by: INTERNAL MEDICINE

## 2023-10-01 PROCEDURE — 6370000000 HC RX 637 (ALT 250 FOR IP): Performed by: NURSE PRACTITIONER

## 2023-10-01 PROCEDURE — 2700000000 HC OXYGEN THERAPY PER DAY

## 2023-10-01 PROCEDURE — 1200000000 HC SEMI PRIVATE

## 2023-10-01 PROCEDURE — 6360000002 HC RX W HCPCS: Performed by: NURSE PRACTITIONER

## 2023-10-01 PROCEDURE — 94761 N-INVAS EAR/PLS OXIMETRY MLT: CPT

## 2023-10-01 RX ADMIN — INSULIN GLARGINE 15 UNITS: 100 INJECTION, SOLUTION SUBCUTANEOUS at 20:50

## 2023-10-01 RX ADMIN — ISOSORBIDE DINITRATE 20 MG: 20 TABLET ORAL at 20:50

## 2023-10-01 RX ADMIN — METOPROLOL SUCCINATE 100 MG: 50 TABLET, EXTENDED RELEASE ORAL at 20:50

## 2023-10-01 RX ADMIN — TORSEMIDE 50 MG: 100 TABLET ORAL at 08:34

## 2023-10-01 RX ADMIN — APIXABAN 5 MG: 5 TABLET, FILM COATED ORAL at 20:50

## 2023-10-01 RX ADMIN — CEFTRIAXONE SODIUM 1000 MG: 1 INJECTION, POWDER, FOR SOLUTION INTRAMUSCULAR; INTRAVENOUS at 20:54

## 2023-10-01 RX ADMIN — PRAVASTATIN SODIUM 40 MG: 40 TABLET ORAL at 08:33

## 2023-10-01 RX ADMIN — SODIUM CHLORIDE, PRESERVATIVE FREE 10 ML: 5 INJECTION INTRAVENOUS at 20:50

## 2023-10-01 RX ADMIN — SODIUM CHLORIDE, PRESERVATIVE FREE 10 ML: 5 INJECTION INTRAVENOUS at 08:35

## 2023-10-01 RX ADMIN — APIXABAN 5 MG: 5 TABLET, FILM COATED ORAL at 08:34

## 2023-10-01 RX ADMIN — IPRATROPIUM BROMIDE AND ALBUTEROL SULFATE 1 DOSE: 2.5; .5 SOLUTION RESPIRATORY (INHALATION) at 08:11

## 2023-10-01 RX ADMIN — ISOSORBIDE DINITRATE 20 MG: 20 TABLET ORAL at 08:34

## 2023-10-01 RX ADMIN — SODIUM CHLORIDE: 9 INJECTION, SOLUTION INTRAVENOUS at 20:53

## 2023-10-01 RX ADMIN — INSULIN LISPRO 4 UNITS: 100 INJECTION, SOLUTION INTRAVENOUS; SUBCUTANEOUS at 17:01

## 2023-10-01 RX ADMIN — PANTOPRAZOLE SODIUM 40 MG: 40 TABLET, DELAYED RELEASE ORAL at 05:58

## 2023-10-01 RX ADMIN — QUETIAPINE FUMARATE 50 MG: 25 TABLET ORAL at 20:50

## 2023-10-01 RX ADMIN — ISOSORBIDE DINITRATE 20 MG: 20 TABLET ORAL at 15:58

## 2023-10-01 RX ADMIN — DULOXETINE HYDROCHLORIDE 60 MG: 60 CAPSULE, DELAYED RELEASE ORAL at 08:34

## 2023-10-01 RX ADMIN — TRAZODONE HYDROCHLORIDE 50 MG: 50 TABLET ORAL at 20:50

## 2023-10-01 RX ADMIN — INSULIN LISPRO 4 UNITS: 100 INJECTION, SOLUTION INTRAVENOUS; SUBCUTANEOUS at 08:34

## 2023-10-01 RX ADMIN — ACETYLCYSTEINE 600 MG: 200 SOLUTION ORAL; RESPIRATORY (INHALATION) at 08:11

## 2023-10-01 RX ADMIN — METOPROLOL SUCCINATE 100 MG: 50 TABLET, EXTENDED RELEASE ORAL at 08:34

## 2023-10-01 RX ADMIN — CLOPIDOGREL BISULFATE 75 MG: 75 TABLET ORAL at 08:34

## 2023-10-01 ASSESSMENT — PAIN SCALES - GENERAL
PAINLEVEL_OUTOF10: 0
PAINLEVEL_OUTOF10: 3

## 2023-10-01 NOTE — PLAN OF CARE
Problem: Safety - Adult  Goal: Free from fall injury  10/1/2023 0857 by Josey Wood RN  Outcome: Progressing  9/30/2023 2232 by Antonio Ko RN  Outcome: Progressing     Pt uses call light appropriately, fall precautions are in place.

## 2023-10-01 NOTE — PLAN OF CARE
Problem: Discharge Planning  Goal: Discharge to home or other facility with appropriate resources  Outcome: Progressing     Problem: Safety - Adult  Goal: Free from fall injury  Outcome: Progressing     Problem: Chronic Conditions and Co-morbidities  Goal: Patient's chronic conditions and co-morbidity symptoms are monitored and maintained or improved  Outcome: Progressing     Problem: Pain  Goal: Verbalizes/displays adequate comfort level or baseline comfort level  Outcome: Progressing     Problem: Metabolic/Fluid and Electrolytes - Adult  Goal: Glucose maintained within prescribed range  Outcome: Progressing     Problem: Skin/Tissue Integrity  Goal: Absence of new skin breakdown  Description: 1. Monitor for areas of redness and/or skin breakdown  2. Assess vascular access sites hourly  3. Every 4-6 hours minimum:  Change oxygen saturation probe site  4. Every 4-6 hours:  If on nasal continuous positive airway pressure, respiratory therapy assess nares and determine need for appliance change or resting period.   Outcome: Progressing

## 2023-10-02 VITALS
SYSTOLIC BLOOD PRESSURE: 157 MMHG | RESPIRATION RATE: 18 BRPM | HEART RATE: 63 BPM | WEIGHT: 214.07 LBS | DIASTOLIC BLOOD PRESSURE: 107 MMHG | TEMPERATURE: 98.1 F | OXYGEN SATURATION: 100 % | BODY MASS INDEX: 31.71 KG/M2 | HEIGHT: 69 IN

## 2023-10-02 LAB
ALBUMIN SERPL-MCNC: 3.5 G/DL (ref 3.4–5)
ANION GAP SERPL CALCULATED.3IONS-SCNC: 18 MMOL/L (ref 3–16)
BUN SERPL-MCNC: 98 MG/DL (ref 7–20)
CALCIUM SERPL-MCNC: 8.8 MG/DL (ref 8.3–10.6)
CHLORIDE SERPL-SCNC: 93 MMOL/L (ref 99–110)
CO2 SERPL-SCNC: 24 MMOL/L (ref 21–32)
CREAT SERPL-MCNC: 9.7 MG/DL (ref 0.9–1.3)
DEPRECATED RDW RBC AUTO: 17.5 % (ref 12.4–15.4)
GFR SERPLBLD CREATININE-BSD FMLA CKD-EPI: 6 ML/MIN/{1.73_M2}
GLUCOSE BLD-MCNC: 104 MG/DL (ref 70–99)
GLUCOSE BLD-MCNC: 131 MG/DL (ref 70–99)
GLUCOSE BLD-MCNC: 182 MG/DL (ref 70–99)
GLUCOSE SERPL-MCNC: 195 MG/DL (ref 70–99)
HCT VFR BLD AUTO: 29.3 % (ref 40.5–52.5)
HGB BLD-MCNC: 9.5 G/DL (ref 13.5–17.5)
MCH RBC QN AUTO: 29.8 PG (ref 26–34)
MCHC RBC AUTO-ENTMCNC: 32.6 G/DL (ref 31–36)
MCV RBC AUTO: 91.3 FL (ref 80–100)
PERFORMED ON: ABNORMAL
PHOSPHATE SERPL-MCNC: 9.3 MG/DL (ref 2.5–4.9)
PLATELET # BLD AUTO: 186 K/UL (ref 135–450)
PMV BLD AUTO: 8.5 FL (ref 5–10.5)
POTASSIUM SERPL-SCNC: 4.9 MMOL/L (ref 3.5–5.1)
RBC # BLD AUTO: 3.21 M/UL (ref 4.2–5.9)
SODIUM SERPL-SCNC: 135 MMOL/L (ref 136–145)
WBC # BLD AUTO: 8.2 K/UL (ref 4–11)

## 2023-10-02 PROCEDURE — 80069 RENAL FUNCTION PANEL: CPT

## 2023-10-02 PROCEDURE — 94660 CPAP INITIATION&MGMT: CPT

## 2023-10-02 PROCEDURE — 2580000003 HC RX 258: Performed by: NURSE PRACTITIONER

## 2023-10-02 PROCEDURE — 90935 HEMODIALYSIS ONE EVALUATION: CPT

## 2023-10-02 PROCEDURE — 6370000000 HC RX 637 (ALT 250 FOR IP): Performed by: NURSE PRACTITIONER

## 2023-10-02 PROCEDURE — 6360000002 HC RX W HCPCS: Performed by: INTERNAL MEDICINE

## 2023-10-02 PROCEDURE — 94669 MECHANICAL CHEST WALL OSCILL: CPT

## 2023-10-02 PROCEDURE — 94761 N-INVAS EAR/PLS OXIMETRY MLT: CPT

## 2023-10-02 PROCEDURE — 85027 COMPLETE CBC AUTOMATED: CPT

## 2023-10-02 PROCEDURE — 2700000000 HC OXYGEN THERAPY PER DAY

## 2023-10-02 PROCEDURE — 94640 AIRWAY INHALATION TREATMENT: CPT

## 2023-10-02 PROCEDURE — 6370000000 HC RX 637 (ALT 250 FOR IP): Performed by: INTERNAL MEDICINE

## 2023-10-02 PROCEDURE — 36556 INSERT NON-TUNNEL CV CATH: CPT

## 2023-10-02 PROCEDURE — 99232 SBSQ HOSP IP/OBS MODERATE 35: CPT | Performed by: INTERNAL MEDICINE

## 2023-10-02 RX ORDER — INSULIN GLARGINE 100 [IU]/ML
10 INJECTION, SOLUTION SUBCUTANEOUS NIGHTLY
Qty: 10 ML | Refills: 3 | Status: SHIPPED | OUTPATIENT
Start: 2023-10-02

## 2023-10-02 RX ORDER — HEPARIN SODIUM 1000 [USP'U]/ML
INJECTION, SOLUTION INTRAVENOUS; SUBCUTANEOUS
Status: DISCONTINUED
Start: 2023-10-02 | End: 2023-10-02 | Stop reason: HOSPADM

## 2023-10-02 RX ADMIN — PANTOPRAZOLE SODIUM 40 MG: 40 TABLET, DELAYED RELEASE ORAL at 05:02

## 2023-10-02 RX ADMIN — ACETYLCYSTEINE 600 MG: 200 SOLUTION ORAL; RESPIRATORY (INHALATION) at 07:52

## 2023-10-02 RX ADMIN — TORSEMIDE 50 MG: 100 TABLET ORAL at 09:26

## 2023-10-02 RX ADMIN — ISOSORBIDE DINITRATE 20 MG: 20 TABLET ORAL at 09:26

## 2023-10-02 RX ADMIN — APIXABAN 5 MG: 5 TABLET, FILM COATED ORAL at 09:26

## 2023-10-02 RX ADMIN — Medication 2 PUFF: at 08:03

## 2023-10-02 RX ADMIN — HEPARIN SODIUM 3600 UNITS: 1000 INJECTION INTRAVENOUS; SUBCUTANEOUS at 16:05

## 2023-10-02 RX ADMIN — IPRATROPIUM BROMIDE AND ALBUTEROL SULFATE 1 DOSE: 2.5; .5 SOLUTION RESPIRATORY (INHALATION) at 11:59

## 2023-10-02 RX ADMIN — PRAVASTATIN SODIUM 40 MG: 40 TABLET ORAL at 09:26

## 2023-10-02 RX ADMIN — SODIUM CHLORIDE, PRESERVATIVE FREE 10 ML: 5 INJECTION INTRAVENOUS at 09:25

## 2023-10-02 RX ADMIN — METOPROLOL SUCCINATE 100 MG: 50 TABLET, EXTENDED RELEASE ORAL at 09:26

## 2023-10-02 RX ADMIN — IPRATROPIUM BROMIDE AND ALBUTEROL SULFATE 1 DOSE: 2.5; .5 SOLUTION RESPIRATORY (INHALATION) at 07:52

## 2023-10-02 RX ADMIN — CLOPIDOGREL BISULFATE 75 MG: 75 TABLET ORAL at 09:26

## 2023-10-02 RX ADMIN — DULOXETINE HYDROCHLORIDE 60 MG: 60 CAPSULE, DELAYED RELEASE ORAL at 09:26

## 2023-10-02 ASSESSMENT — PULMONARY FUNCTION TESTS: PEFR_L/MIN: 17

## 2023-10-02 NOTE — DISCHARGE INSTR - COC
Continuity of Care Form    Patient Name: Nimesh York   :  1970  MRN:  [id number]    Admit date:  2023  Discharge date:  Juan Carlos Hel    Code Status Order: Full Code   Advance Directives:     Admitting Physician:  Roseline Connolly MD  PCP: None None    Discharging Nurse: [id number]  [facility] Unit/Room#: 0113/0113-01  Discharging Unit Phone Number:     Emergency Contact:   Extended Emergency Contact Information  Primary Emergency Contact: Crystal Ann  Address: [address], [address] [place] [address] [address] [place] Phone: [phone]  Mobile Phone: [phone]  Relation: Spouse  Secondary Emergency Contact: ALICIA HARO JR. [facility] Phone: [phone]  Mobile Phone: [phone]  Relation: Brother/Sister  Preferred language: English   needed?  No    Past Surgical History:  Past Surgical History:   Procedure Laterality Date    AORTIC VALVE REPLACEMENT N/A 10/15/2019    TRANSCATHETER AORTIC VALVE REPLACEMENT FEMORAL APPROACH performed by Rubio You MD at [address] [facility] Right 2019    PERITONEAL DIALYSIS CATHETER REMOVAL performed by Morenita Pastor MD at [address] [place] [address]      WN    CORONARY ANGIOPLASTY WITH STENT PLACEMENT  05/26/15    CYST REMOVAL  2013    EXCISION CYSTS, NECK X2 AND ABDOMINAL benign    DIAGNOSTIC CARDIAC CATH LAB PROCEDURE      DIALYSIS FISTULA CREATION Left 10/30/2017    LEFT BRACHIAL CEPHALIC FISTULA    DIALYSIS FISTULA CREATION Left 3/27/2019    LIGATION  AV FISTULA performed by Clive Hobbs MD at [address] [facility] Right 2023    INCISION AND DEBRIDEMENT RIGHT FOOT WITH performed by Anum Corona DPM at [address] [facility] [address] Right 2023    RIGHT FIFTH RAY AMPUTATION performed by Anum Corona DPM at [address] [facility]    IR TUNNELED CATHETER PLACEMENT GREATER THAN 5 YEARS

## 2023-10-02 NOTE — DISCHARGE SUMMARY
[address]      Phone: [phone]   insulin glargine 100 UNIT/ML injection vial                Labs and Imaging   XR FOOT RIGHT (MIN 3 VIEWS)    Result Date: 9/27/2023  EXAMINATION: 3 XRAY VIEWS OF THE RIGHT FOOT 9/27/2023 8:00 pm COMPARISON: 07/05/2023 HISTORY: ORDERING SYSTEM PROVIDED HISTORY: ulcer with previous 5th ray amputation TECHNOLOGIST PROVIDED HISTORY: Reason for exam:->ulcer with previous 5th ray amputation Reason for Exam: ulcer with previous 5th ray amputation FINDINGS: There is overlying artifact obscuring detail throughout foot 5th digit has been surgically resected to the base of the 5th metatarsal bone which is unchanged. No acute fracture or dislocation is seen. The tarsal bones are intact. No erosions are seen. There is no periosteal reaction. There is moderate soft tissue swelling along the midfoot extending distally along the dorsum the foot with vascular calcifications throughout the ankle     Overlying artifact limiting the exam. Previous amputation of the 5th toe which is unchanged. Within the limitations of the exam, no obvious acute bony abnormality can be seen. Moderate soft tissue swelling along the distal foot RECOMMENDATION:     XR CHEST PORTABLE    Result Date: 9/27/2023  EXAMINATION: ONE XRAY VIEW OF THE CHEST 9/26/2023 9:19 pm COMPARISON: September 15, 2023 HISTORY: ORDERING SYSTEM PROVIDED HISTORY: sob TECHNOLOGIST PROVIDED HISTORY: Reason for exam:->sob Reason for Exam: Shortness of Breath FINDINGS: Left chest PermCath in place. Persistent density at the left lung base concerning for pneumonia. Moderate diffuse congestive changes. No pneumothorax. Left chest PermCath in place. Persistent density at the left lung base concerning for pneumonia. Moderate diffuse congestive changes. No pneumothorax. CBC: No results for input(s): \"WBC\", \"HGB\", \"PLT\" in the last 72 hours.   BMP:  No

## 2023-10-02 NOTE — CARE COORDINATION
CASE MANAGEMENT DISCHARGE SUMMARY      Discharge to: home with wife, Lorna Carlisle for home oxygen and bipap, patient denies other needs at this time. Precertification completed: [facility] Exemption Notification (HENS) completed:   NA  IMM given: 10/2/23     New Durable Medical Equipment ordered/agency: [employer] for Bipap    Transportation:    Family/car: wife       Confirmed discharge plan with:     Patient: yes     Family:  yes, wife per RN          RN, name: Carla    Note: Discharging nurse to complete CELINE, reconcile AVS, and place final copy with patient's discharge packet. RN to ensure that written prescriptions for  Level II medications are sent with patient to the facility as per protocol.

## 2023-10-02 NOTE — PLAN OF CARE
CHF Care Plan      Patient's EF (Ejection Fraction) is less than 40%    Heart Failure Medications:  Diuretics[de-identified] Torsemide    (One of the following REQUIRED for EF </= 40%/SYSTOLIC FAILURE but MAY be used in EF% >40%/DIASTOLIC FAILURE)        ACE[de-identified] None        ARB[de-identified] None         ARNI[de-identified] None    (Beta Blockers)  NON- Evidenced Based Beta Blocker (for EF% >40%/DIASTOLIC FAILURE): None    Evidenced Based Beta Blocker::(REQUIRED for EF% <40%/SYSTOLIC FAILURE) Metoprolol SUCCinate- Toprol XL  . .................................................................................................................................................. Healthy Weight Tracking - BMI + Meds 9/27/2023 9/28/2023 9/29/2023 9/29/2023 9/29/2023 9/29/2023 10/1/2023   Weight 216 lb 0.8 oz 230 lb 6.1 oz 213 lb 14.4 oz 213 lb 218 lb 14.7 oz 213 lb 13.5 oz (No Data)   Height - - - - - - -   Body Mass Index 31.91 kg/m2 34.02 kg/m2 31.59 kg/m2 31.45 kg/m2 32.33 kg/m2 31.58 kg/m2 -   Some recent data might be hidden         Patient's weights and intake/output reviewed: Yes    Daily Weight log at bedside, patient/family participation in use of log: \"yes    Patient's Last Weight: 229 lbs obtained by bed scale. Difference of 16 lbs more than last documented weight. Pt refused weight yesterday and wanted bed weight this AM, though last documented weight was done via bed.        Intake/Output Summary (Last 24 hours) at 10/1/2023 2154  Last data filed at 10/1/2023 2043  Gross per 24 hour   Intake 1080 ml   Output 0 ml   Net 1080 ml       Education Booklet Provided: yes    Comorbidities Reviewed Yes    Patient has a past medical history of Ambulatory dysfunction, Aortic stenosis, Arthritis, Asthma, Bilateral hilar adenopathy syndrome, CAD (coronary artery disease), Cardiomyopathy ([address]), CHF (congestive heart failure) ([address]), Chronic pain, COPD (chronic obstructive pulmonary disease) ([address]), Depression, Diabetes mellitus ([address]), Difficult intravenous access,

## 2023-10-03 ENCOUNTER — CARE COORDINATION (OUTPATIENT)
Dept: CASE MANAGEMENT | Age: 53
End: 2023-10-03

## 2023-10-03 NOTE — CARE COORDINATION
Care Transitions Initial Follow Up Call    Call within 2 business days of discharge: Yes    Patient: Mariana Stein Patient : 1970   MRN: [id number]  Reason for Admission: PNA LLL  Discharge Date: 10/2/23 RARS: Readmission Risk Score: 44.4      Last Discharge [address] [facility]       Date Complaint Diagnosis Description Type Department Provider    23 Shortness of Breath Community acquired pneumonia of left lower lobe of lung . .. ED to Hosp-Admission (Discharged) (ADMITTED) Liam Lam MD; Ambar Kothari. .. Attempted to reach patient via phone for initial post hospital transition call. VM left on his and wife's numbers stating purpose of call along with my contact information requesting a return call. Care Transitions 24 Hour Call    Do you have all of your prescriptions and are they filled?: Yes  Do you have support at home?: Partner/Spouse/SO  Are you an active caregiver in your home?: No  Care Transitions Interventions                                 Follow Up  No future appointments.   Chapincito Hall RN

## 2023-10-04 ENCOUNTER — CARE COORDINATION (OUTPATIENT)
Dept: CASE MANAGEMENT | Age: 53
End: 2023-10-04

## 2023-10-04 NOTE — CARE COORDINATION
Care Transitions Initial Follow Up Call    Call within 2 business days of discharge: Yes      Patient: Tori Guevara Patient : 1970   MRN: [id number]  Reason for Admission: PNA LLL  Discharge Date: 10/2/23 RARS: Readmission Risk Score: 44.4      Last Discharge Facility       Date Complaint Diagnosis Description Type Department Provider    23 Shortness of Breath Community acquired pneumonia of left lower lobe of lung . .. ED to Hosp-Admission (Discharged) (ADMITTED) Loulou Melgar MD; BRIANDA Salcedo.. Second and final attempt made to reach patient for initial post hospital transition call.  Shade Alpers is full- no option to leave message  Notes reviewed and care clinic brochure given to patient for post discharge follow up needs and establishing PCP. Attempt call to wife, Dontrell Bhagat- for initial post hospital transition call. VM left stating purpose of call along with my contact information requesting a return call. Care Transitions 24 Hour Call    Do you have all of your prescriptions and are they filled?: Yes  Do you have support at home?: Partner/Spouse/SO  Are you an active caregiver in your home?: No  Care Transitions Interventions                                     Follow Up  No future appointments.     Enrico Gallegos RN

## 2023-10-09 ENCOUNTER — HOSPITAL ENCOUNTER (INPATIENT)
Age: 53
LOS: 2 days | Discharge: HOME | End: 2023-10-11
Attending: EMERGENCY MEDICINE | Admitting: STUDENT IN AN ORGANIZED HEALTH CARE EDUCATION/TRAINING PROGRAM
Payer: MEDICARE

## 2023-10-09 ENCOUNTER — APPOINTMENT (OUTPATIENT)
Dept: GENERAL RADIOLOGY | Age: 53
End: 2023-10-09
Payer: MEDICARE

## 2023-10-09 ENCOUNTER — TELEPHONE (OUTPATIENT)
Dept: OTHER | Facility: CLINIC | Age: 53
End: 2023-10-09

## 2023-10-09 DIAGNOSIS — J96.10 CHRONIC RESPIRATORY FAILURE, UNSPECIFIED WHETHER WITH HYPOXIA OR HYPERCAPNIA (HCC): Primary | ICD-10-CM

## 2023-10-09 LAB
ALBUMIN SERPL-MCNC: 4 G/DL (ref 3.4–5)
ALBUMIN/GLOB SERPL: 1.1 {RATIO} (ref 1.1–2.2)
ALP SERPL-CCNC: 110 U/L (ref 40–129)
ALT SERPL-CCNC: 12 U/L (ref 10–40)
ANION GAP SERPL CALCULATED.3IONS-SCNC: 19 MMOL/L (ref 3–16)
AST SERPL-CCNC: 17 U/L (ref 15–37)
BASE EXCESS BLDV CALC-SCNC: 2.9 MMOL/L (ref -3–3)
BASOPHILS # BLD: 0 K/UL (ref 0–0.2)
BASOPHILS NFR BLD: 0.3 %
BILIRUB SERPL-MCNC: 0.3 MG/DL (ref 0–1)
BUN SERPL-MCNC: 63 MG/DL (ref 7–20)
CALCIUM SERPL-MCNC: 9.3 MG/DL (ref 8.3–10.6)
CHLORIDE SERPL-SCNC: 81 MMOL/L (ref 99–110)
CO2 BLDV-SCNC: 31 MMOL/L
CO2 SERPL-SCNC: 26 MMOL/L (ref 21–32)
COHGB MFR BLDV: 1.9 % (ref 0–1.5)
CREAT SERPL-MCNC: 7.6 MG/DL (ref 0.9–1.3)
DEPRECATED RDW RBC AUTO: 17.1 % (ref 12.4–15.4)
EKG ATRIAL RATE: 113 BPM
EKG DIAGNOSIS: NORMAL
EKG P AXIS: 101 DEGREES
EKG P-R INTERVAL: 162 MS
EKG Q-T INTERVAL: 346 MS
EKG QRS DURATION: 90 MS
EKG QTC CALCULATION (BAZETT): 474 MS
EKG R AXIS: -1 DEGREES
EKG T AXIS: 1 DEGREES
EKG VENTRICULAR RATE: 113 BPM
EOSINOPHIL # BLD: 0.1 K/UL (ref 0–0.6)
EOSINOPHIL NFR BLD: 0.7 %
GFR SERPLBLD CREATININE-BSD FMLA CKD-EPI: 8 ML/MIN/{1.73_M2}
GLUCOSE BLD-MCNC: 172 MG/DL (ref 70–99)
GLUCOSE SERPL-MCNC: 217 MG/DL (ref 70–99)
HCO3 BLDV-SCNC: 29.1 MMOL/L (ref 23–29)
HCT VFR BLD AUTO: 34.6 % (ref 40.5–52.5)
HGB BLD-MCNC: 11.1 G/DL (ref 13.5–17.5)
LACTATE BLDV-SCNC: 1.6 MMOL/L (ref 0.4–1.9)
LYMPHOCYTES # BLD: 0.5 K/UL (ref 1–5.1)
LYMPHOCYTES NFR BLD: 4 %
MCH RBC QN AUTO: 28.9 PG (ref 26–34)
MCHC RBC AUTO-ENTMCNC: 32.2 G/DL (ref 31–36)
MCV RBC AUTO: 89.8 FL (ref 80–100)
METHGB MFR BLDV: 0.2 %
MONOCYTES # BLD: 0.7 K/UL (ref 0–1.3)
MONOCYTES NFR BLD: 6.3 %
NEUTROPHILS # BLD: 10.5 K/UL (ref 1.7–7.7)
NEUTROPHILS NFR BLD: 88.7 %
NT-PROBNP SERPL-MCNC: ABNORMAL PG/ML (ref 0–124)
O2 THERAPY: ABNORMAL
PCO2 BLDV: 51.3 MMHG (ref 40–50)
PERFORMED ON: ABNORMAL
PH BLDV: 7.37 [PH] (ref 7.35–7.45)
PLATELET # BLD AUTO: 247 K/UL (ref 135–450)
PMV BLD AUTO: 8.2 FL (ref 5–10.5)
PO2 BLDV: 34 MMHG (ref 25–40)
POTASSIUM SERPL-SCNC: 4.6 MMOL/L (ref 3.5–5.1)
PROT SERPL-MCNC: 7.5 G/DL (ref 6.4–8.2)
RBC # BLD AUTO: 3.85 M/UL (ref 4.2–5.9)
SAO2 % BLDV: 58 %
SODIUM SERPL-SCNC: 126 MMOL/L (ref 136–145)
TROPONIN, HIGH SENSITIVITY: 116 NG/L (ref 0–22)
TROPONIN, HIGH SENSITIVITY: 126 NG/L (ref 0–22)
WBC # BLD AUTO: 11.8 K/UL (ref 4–11)

## 2023-10-09 PROCEDURE — 93010 ELECTROCARDIOGRAM REPORT: CPT | Performed by: INTERNAL MEDICINE

## 2023-10-09 PROCEDURE — 82803 BLOOD GASES ANY COMBINATION: CPT

## 2023-10-09 PROCEDURE — 2060000000 HC ICU INTERMEDIATE R&B

## 2023-10-09 PROCEDURE — 84484 ASSAY OF TROPONIN QUANT: CPT

## 2023-10-09 PROCEDURE — 85025 COMPLETE CBC W/AUTO DIFF WBC: CPT

## 2023-10-09 PROCEDURE — 83605 ASSAY OF LACTIC ACID: CPT

## 2023-10-09 PROCEDURE — 5A09357 ASSISTANCE WITH RESPIRATORY VENTILATION, LESS THAN 24 CONSECUTIVE HOURS, CONTINUOUS POSITIVE AIRWAY PRESSURE: ICD-10-PCS | Performed by: HOSPITALIST

## 2023-10-09 PROCEDURE — 94761 N-INVAS EAR/PLS OXIMETRY MLT: CPT

## 2023-10-09 PROCEDURE — 96375 TX/PRO/DX INJ NEW DRUG ADDON: CPT

## 2023-10-09 PROCEDURE — 96374 THER/PROPH/DIAG INJ IV PUSH: CPT

## 2023-10-09 PROCEDURE — 2700000000 HC OXYGEN THERAPY PER DAY

## 2023-10-09 PROCEDURE — 5A1D70Z PERFORMANCE OF URINARY FILTRATION, INTERMITTENT, LESS THAN 6 HOURS PER DAY: ICD-10-PCS | Performed by: HOSPITALIST

## 2023-10-09 PROCEDURE — 93005 ELECTROCARDIOGRAM TRACING: CPT | Performed by: EMERGENCY MEDICINE

## 2023-10-09 PROCEDURE — 94640 AIRWAY INHALATION TREATMENT: CPT

## 2023-10-09 PROCEDURE — 2580000003 HC RX 258: Performed by: STUDENT IN AN ORGANIZED HEALTH CARE EDUCATION/TRAINING PROGRAM

## 2023-10-09 PROCEDURE — 80053 COMPREHEN METABOLIC PANEL: CPT

## 2023-10-09 PROCEDURE — 6360000002 HC RX W HCPCS: Performed by: EMERGENCY MEDICINE

## 2023-10-09 PROCEDURE — 6370000000 HC RX 637 (ALT 250 FOR IP): Performed by: STUDENT IN AN ORGANIZED HEALTH CARE EDUCATION/TRAINING PROGRAM

## 2023-10-09 PROCEDURE — 90935 HEMODIALYSIS ONE EVALUATION: CPT

## 2023-10-09 PROCEDURE — 83036 HEMOGLOBIN GLYCOSYLATED A1C: CPT

## 2023-10-09 PROCEDURE — 99285 EMERGENCY DEPT VISIT HI MDM: CPT

## 2023-10-09 PROCEDURE — 99223 1ST HOSP IP/OBS HIGH 75: CPT | Performed by: STUDENT IN AN ORGANIZED HEALTH CARE EDUCATION/TRAINING PROGRAM

## 2023-10-09 PROCEDURE — 83880 ASSAY OF NATRIURETIC PEPTIDE: CPT

## 2023-10-09 PROCEDURE — 71045 X-RAY EXAM CHEST 1 VIEW: CPT

## 2023-10-09 PROCEDURE — 94660 CPAP INITIATION&MGMT: CPT

## 2023-10-09 PROCEDURE — 6370000000 HC RX 637 (ALT 250 FOR IP): Performed by: EMERGENCY MEDICINE

## 2023-10-09 RX ORDER — IPRATROPIUM BROMIDE AND ALBUTEROL SULFATE 2.5; .5 MG/3ML; MG/3ML
1 SOLUTION RESPIRATORY (INHALATION)
Status: DISCONTINUED | OUTPATIENT
Start: 2023-10-09 | End: 2023-10-11

## 2023-10-09 RX ORDER — ACETAMINOPHEN 325 MG/1
650 TABLET ORAL EVERY 6 HOURS PRN
Status: DISCONTINUED | OUTPATIENT
Start: 2023-10-09 | End: 2023-10-11 | Stop reason: HOSPADM

## 2023-10-09 RX ORDER — DULOXETIN HYDROCHLORIDE 60 MG/1
60 CAPSULE, DELAYED RELEASE ORAL DAILY
Status: DISCONTINUED | OUTPATIENT
Start: 2023-10-09 | End: 2023-10-11 | Stop reason: HOSPADM

## 2023-10-09 RX ORDER — ONDANSETRON 4 MG/1
4 TABLET, ORALLY DISINTEGRATING ORAL EVERY 8 HOURS PRN
Status: DISCONTINUED | OUTPATIENT
Start: 2023-10-09 | End: 2023-10-11 | Stop reason: HOSPADM

## 2023-10-09 RX ORDER — ISOSORBIDE DINITRATE 20 MG/1
20 TABLET ORAL 3 TIMES DAILY
Status: DISCONTINUED | OUTPATIENT
Start: 2023-10-09 | End: 2023-10-11 | Stop reason: HOSPADM

## 2023-10-09 RX ORDER — METHYLPREDNISOLONE SODIUM SUCCINATE 40 MG/ML
40 INJECTION, POWDER, LYOPHILIZED, FOR SOLUTION INTRAMUSCULAR; INTRAVENOUS ONCE
Status: COMPLETED | OUTPATIENT
Start: 2023-10-09 | End: 2023-10-09

## 2023-10-09 RX ORDER — TRAZODONE HYDROCHLORIDE 50 MG/1
50 TABLET ORAL DAILY
Status: DISCONTINUED | OUTPATIENT
Start: 2023-10-09 | End: 2023-10-11 | Stop reason: HOSPADM

## 2023-10-09 RX ORDER — POLYETHYLENE GLYCOL 3350 17 G/17G
17 POWDER, FOR SOLUTION ORAL DAILY PRN
Status: DISCONTINUED | OUTPATIENT
Start: 2023-10-09 | End: 2023-10-11 | Stop reason: HOSPADM

## 2023-10-09 RX ORDER — ACETAMINOPHEN 650 MG/1
650 SUPPOSITORY RECTAL EVERY 6 HOURS PRN
Status: DISCONTINUED | OUTPATIENT
Start: 2023-10-09 | End: 2023-10-11 | Stop reason: HOSPADM

## 2023-10-09 RX ORDER — HEPARIN SODIUM 5000 [USP'U]/ML
5000 INJECTION, SOLUTION INTRAVENOUS; SUBCUTANEOUS EVERY 8 HOURS SCHEDULED
Status: DISCONTINUED | OUTPATIENT
Start: 2023-10-09 | End: 2023-10-09

## 2023-10-09 RX ORDER — IPRATROPIUM BROMIDE AND ALBUTEROL SULFATE 2.5; .5 MG/3ML; MG/3ML
1 SOLUTION RESPIRATORY (INHALATION) EVERY 6 HOURS PRN
Status: DISCONTINUED | OUTPATIENT
Start: 2023-10-09 | End: 2023-10-11 | Stop reason: HOSPADM

## 2023-10-09 RX ORDER — OXYCODONE HYDROCHLORIDE 5 MG/1
5 TABLET ORAL EVERY 6 HOURS PRN
Status: DISCONTINUED | OUTPATIENT
Start: 2023-10-09 | End: 2023-10-11 | Stop reason: HOSPADM

## 2023-10-09 RX ORDER — SODIUM CHLORIDE 0.9 % (FLUSH) 0.9 %
5-40 SYRINGE (ML) INJECTION PRN
Status: DISCONTINUED | OUTPATIENT
Start: 2023-10-09 | End: 2023-10-11 | Stop reason: HOSPADM

## 2023-10-09 RX ORDER — SODIUM CHLORIDE 9 MG/ML
INJECTION, SOLUTION INTRAVENOUS PRN
Status: DISCONTINUED | OUTPATIENT
Start: 2023-10-09 | End: 2023-10-11 | Stop reason: HOSPADM

## 2023-10-09 RX ORDER — IPRATROPIUM BROMIDE AND ALBUTEROL SULFATE 2.5; .5 MG/3ML; MG/3ML
1 SOLUTION RESPIRATORY (INHALATION) ONCE
Status: COMPLETED | OUTPATIENT
Start: 2023-10-09 | End: 2023-10-09

## 2023-10-09 RX ORDER — PRAVASTATIN SODIUM 40 MG
40 TABLET ORAL NIGHTLY
Status: DISCONTINUED | OUTPATIENT
Start: 2023-10-09 | End: 2023-10-11 | Stop reason: HOSPADM

## 2023-10-09 RX ORDER — PANTOPRAZOLE SODIUM 40 MG/1
40 TABLET, DELAYED RELEASE ORAL
Status: DISCONTINUED | OUTPATIENT
Start: 2023-10-10 | End: 2023-10-11 | Stop reason: HOSPADM

## 2023-10-09 RX ORDER — QUETIAPINE FUMARATE 25 MG/1
50 TABLET, FILM COATED ORAL NIGHTLY
Status: DISCONTINUED | OUTPATIENT
Start: 2023-10-09 | End: 2023-10-11 | Stop reason: HOSPADM

## 2023-10-09 RX ORDER — DEXTROSE MONOHYDRATE 100 MG/ML
INJECTION, SOLUTION INTRAVENOUS CONTINUOUS PRN
Status: DISCONTINUED | OUTPATIENT
Start: 2023-10-09 | End: 2023-10-11 | Stop reason: HOSPADM

## 2023-10-09 RX ORDER — TORSEMIDE 100 MG/1
100 TABLET ORAL DAILY
Status: DISCONTINUED | OUTPATIENT
Start: 2023-10-10 | End: 2023-10-11 | Stop reason: HOSPADM

## 2023-10-09 RX ORDER — HEPARIN SODIUM 1000 [USP'U]/ML
INJECTION, SOLUTION INTRAVENOUS; SUBCUTANEOUS
Status: DISPENSED
Start: 2023-10-09 | End: 2023-10-10

## 2023-10-09 RX ORDER — ONDANSETRON 2 MG/ML
4 INJECTION INTRAMUSCULAR; INTRAVENOUS EVERY 6 HOURS PRN
Status: DISCONTINUED | OUTPATIENT
Start: 2023-10-09 | End: 2023-10-11 | Stop reason: HOSPADM

## 2023-10-09 RX ORDER — CLOPIDOGREL BISULFATE 75 MG/1
75 TABLET ORAL DAILY
Status: DISCONTINUED | OUTPATIENT
Start: 2023-10-09 | End: 2023-10-11 | Stop reason: HOSPADM

## 2023-10-09 RX ORDER — INSULIN LISPRO 100 [IU]/ML
0-4 INJECTION, SOLUTION INTRAVENOUS; SUBCUTANEOUS NIGHTLY
Status: DISCONTINUED | OUTPATIENT
Start: 2023-10-09 | End: 2023-10-11 | Stop reason: HOSPADM

## 2023-10-09 RX ORDER — TORSEMIDE 100 MG/1
100 TABLET ORAL DAILY
Status: DISCONTINUED | OUTPATIENT
Start: 2023-10-09 | End: 2023-10-09

## 2023-10-09 RX ORDER — INSULIN LISPRO 100 [IU]/ML
0-4 INJECTION, SOLUTION INTRAVENOUS; SUBCUTANEOUS
Status: DISCONTINUED | OUTPATIENT
Start: 2023-10-09 | End: 2023-10-11 | Stop reason: HOSPADM

## 2023-10-09 RX ORDER — FUROSEMIDE 10 MG/ML
60 INJECTION INTRAMUSCULAR; INTRAVENOUS ONCE
Status: COMPLETED | OUTPATIENT
Start: 2023-10-09 | End: 2023-10-09

## 2023-10-09 RX ORDER — METOPROLOL SUCCINATE 50 MG/1
100 TABLET, EXTENDED RELEASE ORAL 2 TIMES DAILY
Status: DISCONTINUED | OUTPATIENT
Start: 2023-10-09 | End: 2023-10-11 | Stop reason: HOSPADM

## 2023-10-09 RX ORDER — SODIUM CHLORIDE 0.9 % (FLUSH) 0.9 %
5-40 SYRINGE (ML) INJECTION EVERY 12 HOURS SCHEDULED
Status: DISCONTINUED | OUTPATIENT
Start: 2023-10-09 | End: 2023-10-11 | Stop reason: HOSPADM

## 2023-10-09 RX ADMIN — METHYLPREDNISOLONE SODIUM SUCCINATE 40 MG: 40 INJECTION INTRAMUSCULAR; INTRAVENOUS at 05:15

## 2023-10-09 RX ADMIN — TRAZODONE HYDROCHLORIDE 50 MG: 50 TABLET ORAL at 22:05

## 2023-10-09 RX ADMIN — QUETIAPINE FUMARATE 50 MG: 25 TABLET ORAL at 22:05

## 2023-10-09 RX ADMIN — METOPROLOL SUCCINATE 100 MG: 50 TABLET, EXTENDED RELEASE ORAL at 22:05

## 2023-10-09 RX ADMIN — OXYCODONE HYDROCHLORIDE 5 MG: 5 TABLET ORAL at 10:12

## 2023-10-09 RX ADMIN — IPRATROPIUM BROMIDE AND ALBUTEROL SULFATE 1 DOSE: 2.5; .5 SOLUTION RESPIRATORY (INHALATION) at 16:25

## 2023-10-09 RX ADMIN — CLOPIDOGREL BISULFATE 75 MG: 75 TABLET ORAL at 13:51

## 2023-10-09 RX ADMIN — METOPROLOL SUCCINATE 100 MG: 50 TABLET, EXTENDED RELEASE ORAL at 10:12

## 2023-10-09 RX ADMIN — IPRATROPIUM BROMIDE AND ALBUTEROL SULFATE 1 DOSE: 2.5; .5 SOLUTION RESPIRATORY (INHALATION) at 05:12

## 2023-10-09 RX ADMIN — ISOSORBIDE DINITRATE 20 MG: 20 TABLET ORAL at 13:51

## 2023-10-09 RX ADMIN — OXYCODONE HYDROCHLORIDE 5 MG: 5 TABLET ORAL at 16:02

## 2023-10-09 RX ADMIN — APIXABAN 5 MG: 5 TABLET, FILM COATED ORAL at 22:05

## 2023-10-09 RX ADMIN — FUROSEMIDE 60 MG: 10 INJECTION, SOLUTION INTRAMUSCULAR; INTRAVENOUS at 07:24

## 2023-10-09 RX ADMIN — SODIUM CHLORIDE, PRESERVATIVE FREE 10 ML: 5 INJECTION INTRAVENOUS at 10:13

## 2023-10-09 RX ADMIN — IPRATROPIUM BROMIDE AND ALBUTEROL SULFATE 1 DOSE: 2.5; .5 SOLUTION RESPIRATORY (INHALATION) at 21:13

## 2023-10-09 RX ADMIN — PRAVASTATIN SODIUM 40 MG: 40 TABLET ORAL at 22:05

## 2023-10-09 RX ADMIN — DULOXETINE HYDROCHLORIDE 60 MG: 60 CAPSULE, DELAYED RELEASE ORAL at 10:12

## 2023-10-09 RX ADMIN — ISOSORBIDE DINITRATE 20 MG: 20 TABLET ORAL at 22:05

## 2023-10-09 RX ADMIN — ISOSORBIDE DINITRATE 20 MG: 20 TABLET ORAL at 10:12

## 2023-10-09 ASSESSMENT — ENCOUNTER SYMPTOMS
ABDOMINAL DISTENTION: 0
NAUSEA: 0
SORE THROAT: 0
STRIDOR: 0
EYE DISCHARGE: 0
SHORTNESS OF BREATH: 1
EYE PAIN: 0
BACK PAIN: 0
COLOR CHANGE: 0
EYE REDNESS: 0
COUGH: 0
EYE ITCHING: 0
VOMITING: 0
ABDOMINAL PAIN: 0
DIARRHEA: 0
CONSTIPATION: 0
TROUBLE SWALLOWING: 0
WHEEZING: 0

## 2023-10-09 ASSESSMENT — PAIN SCALES - GENERAL
PAINLEVEL_OUTOF10: 8
PAINLEVEL_OUTOF10: 0
PAINLEVEL_OUTOF10: 8
PAINLEVEL_OUTOF10: 7

## 2023-10-09 ASSESSMENT — PATIENT HEALTH QUESTIONNAIRE - PHQ9
SUM OF ALL RESPONSES TO PHQ QUESTIONS 1-9: 0
1. LITTLE INTEREST OR PLEASURE IN DOING THINGS: 0
SUM OF ALL RESPONSES TO PHQ QUESTIONS 1-9: 0
SUM OF ALL RESPONSES TO PHQ9 QUESTIONS 1 & 2: 0
2. FEELING DOWN, DEPRESSED OR HOPELESS: 0

## 2023-10-09 ASSESSMENT — PAIN DESCRIPTION - LOCATION
LOCATION: BACK
LOCATION: BACK

## 2023-10-09 ASSESSMENT — PAIN - FUNCTIONAL ASSESSMENT
PAIN_FUNCTIONAL_ASSESSMENT: 0-10
PAIN_FUNCTIONAL_ASSESSMENT: 0-10

## 2023-10-09 NOTE — CARE COORDINATION
Case Management Assessment  Initial Evaluation    Date/Time of Evaluation: 10/9/2023 9:01 AM  Assessment Completed by: NINO Mahmood    If patient is discharged prior to next notation, then this note serves as note for discharge by case management. Patient Name: María Elena Patel                   YOB: 1970  Diagnosis: ESRD (end stage renal disease) [facility]) [N18.6]                   Date / Time: 10/9/2023  4:48 AM    Patient Admission Status: Inpatient   Readmission Risk (Low < 19, Mod (19-27), High > 27): Readmission Risk Score: 44.4    Current PCP: None, None  PCP verified by CM? Yes    Chart Reviewed: Yes      History Provided by: Patient  Patient Orientation: Alert and Oriented    Patient Cognition: Alert    Hospitalization in the last 30 days (Readmission):  Yes    If yes, Readmission Assessment in CM Navigator will be completed. Advance Directives:      Code Status: Prior   Patient's Primary Decision Maker is: Named in [facility]     Primary Decision Maker: Nohemidu - Spouse - [phone]    Secondary Decision Maker: Tabby Diamond - Brother/Sister - [phone]    Discharge Planning:    Patient lives with: (P) Spouse/Significant Other Type of Home: (P) Trailer/Mobile Home  Primary Care Giver: Self  Patient Support Systems include: Spouse/Significant Other   Current Financial resources: None  Current community resources: ECF/Home Care  Current services prior to admission: (P) Durable Medical Equipment, Oxygen Therapy            Current DME: (P) Oxygen Therapy (Comment), Bipap            Type of Home Care services:  (P) None    ADLS  Prior functional level: Assistance with the following:, Cooking, Housework, Shopping  Current functional level: Assistance with the following:, Cooking, Housework, Shopping    PT AM-PAC:   /24  OT AM-PAC:   /24    Family can provide assistance at DC:  Yes  Would you like Case Management to discuss the discharge plan with any other

## 2023-10-09 NOTE — PROGRESS NOTES
Treatment time: 3.5 hours  Net UF: 4000 ml     Pre weight: 96.2 kg  Post weight:93.2 kg  EDW: 93 kg  Crit Line Used: yes   Access used: cvc    Access function: good with  ml/min       Regular outpatient schedule: mwf     Summary of response to treatment: Patient tolerated treatment well and without any complications. Patient remained stable throughout entire treatment      Report given to Moreno Lorenzo RN, RN and copy of dialysis treatment record placed in chart, to be scanned into EMR.

## 2023-10-09 NOTE — CONSULTS
[facility] Consult Note           Reason for Consult:  End stage kidney disease - on HD  Requesting Physician:  Dr. Magen Rosales    Chief Complaint:  Shortness of breath  History Obtained From:  patient, electronic medical record    History of Present Ilness:    52year old male with ESKD on HD MWF, sCHF, COPD on 4 L NC at home presents with worsening shortness of breath. We have been asked to assist in further dialysis care. Last had HD on 10/6 with 4 liters removed, post-weight of 93 kg. Presented with worsening shortness of breath for past 1 day, no fevers, cough, or chest pain.     Past Medical History:        Diagnosis Date    Ambulatory dysfunction     walker for long distances, SOB with distance    Aortic stenosis     echo 2017    Arthritis     hands and hips    Asthma     Bilateral hilar adenopathy syndrome 6/3/2013    CAD (coronary artery disease)     Dr. Michael Man [facility]) 04/19/2019    EF= 43%    CHF (congestive heart failure) ([address])     Chronic pain     COPD (chronic obstructive pulmonary disease) ([address])     pulmonology Dr. Arline Noguera    Depression     Diabetes mellitus ([address])     borderline    Difficult intravenous access     Emphysema of lung ([address])     ESRD (end stage renal disease) on dialysis ([address])     MWF    Fear of needles     Gastric ulcer     GERD (gastroesophageal reflux disease)     Heart valve problem     bicuspic valve    Hemodialysis patient ([address])     History of spinal fracture     work incident    Hx of blood clots     Bilateral lower extremities; stents in place    Hyperlipidemia     Hypertension     MI (myocardial infarction) ([address]) 2019    has had 9 MIs. 2019 was the last    Neuromuscular disorder ([address])     due to CVA    Numbness and tingling in left arm     from fistula    Pneumonia     PONV (postoperative nausea and vomiting)     Prolonged emergence from general anesthesia     States requires more medication than most

## 2023-10-09 NOTE — ACP (ADVANCE CARE PLANNING)
Advance Care Planning     General Advance Care Planning (ACP) Conversation    Date of Conversation: 10/9/2023  Conducted with: Patient with Decision Making Capacity    Healthcare Decision Maker:    Primary Decision Maker: Neelam [address]Hol[address] - [phone]    Secondary Decision Maker: Lenin Vivar - Brother/Sister - [phone]  Click here to complete [address] including selection of the Healthcare Decision Maker Relationship (ie \"Primary\"). Today we documented Decision Maker(s) consistent with Legal Next of Kin hierarchy. Content/Action Overview:   Has ACP document(s) on file - reflects the patient's care preferences        Length of Voluntary ACP Conversation in minutes:  <16 minutes (Non-Billable)    NINO Mcginnis

## 2023-10-09 NOTE — PROGRESS NOTES
10/09/23 1237   NIV Type   Equipment Type V60   Mode Bilevel   Mask Type Full face mask   Mask Size Medium   Assessment   Pulse 78   Respirations 18   SpO2 100 %   Level of Consciousness 0   Comfort Level Good   Using Accessory Muscles No   Mask Compliance Good   Skin Assessment Clean, dry, & intact   Skin Protection for O2 Device No   Settings/Measurements   PIP Observed 19 cm H20   IPAP 18 cmH20   CPAP/EPAP 5 cmH2O   Vt (Measured) 679 mL   Rate Ordered 12   Insp Rise Time (%) 1 %   FiO2  (S)  45 %   I Time/ I Time % 1 s   Minute Volume (L/min) 13.4 Liters   Mask Leak (lpm) 10 lpm   Alarm Settings   Alarms On Y   Low Pressure (cmH2O) 6 cmH2O   High Pressure (cmH2O) 30 cmH2O   RR Low (bpm) 6   RR High (bpm) 40 br/min

## 2023-10-09 NOTE — ED PROVIDER NOTES
[address] [place] [address]  ED  EMERGENCY DEPARTMENT ENCOUNTER        Pt Name: Vlad Willingham  MRN: [id number]  [address] [facility] [place] 1970  Date of evaluation: 10/9/2023  Provider: Kalani Kohler MD  PCP: None, None  Note Started: 8:11 AM EDT 10/9/23    CHIEF COMPLAINT       Chief Complaint   Patient presents with    Shortness of Breath     SOB for a few hrs. 2 duonebs and 1 albuterol en route. Arrived on CPAP. 4L n/c at baseline       HISTORY OF PRESENT ILLNESS: 1 or more Elements             Vlad Willingham is a 52 y.o. male who presents acute shortness of breath. The patient is on 4 L of oxygen at baseline and states he had acute worsening. He took a DuoNeb at home and EMS gave 1 in route with no significant improvement. He denies any fevers or chills no cough. He is not appreciating swelling but he states that he has not missed any dialysis but he quit smoking several months ago. Nursing Notes were all reviewed and agreed with or any disagreements were addressed in the HPI. REVIEW OF SYSTEMS :      Review of Systems    Positives and Pertinent negatives as per HPI.      SURGICAL HISTORY     Past Surgical History:   Procedure Laterality Date    AORTIC VALVE REPLACEMENT N/A 10/15/2019    TRANSCATHETER AORTIC VALVE REPLACEMENT FEMORAL APPROACH performed by Ana Mata MD at [address] Right 7/2/2019    PERITONEAL DIALYSIS CATHETER [facility]  2/29/[facility]    WN    CORONARY ANGIOPLASTY WITH STENT PLACEMENT  05/26/15    CYST REMOVAL  08/14/2013    EXCISION CYSTS, NECK X2 AND ABDOMINAL benign    DIAGNOSTIC CARDIAC CATH LAB PROCEDURE      DIALYSIS FISTULA CREATION Left 10/30/2017    LEFT BRACHIAL CEPHALIC FISTULA    DIALYSIS FISTULA CREATION Left 3/27/2019    LIGATION  AV FISTULA performed by Amber Patiño MD at [address], COLON, DIAGNOSTIC      FOOT DEBRIDEMENT Right 5/26/2023    INCISION AND 107 (!) 104 (!) 103   Resp: 21 19 20 18   Temp:       TempSrc:       SpO2: 100% 100% 100% 100%   Weight:       Height:           Patient was given the following medications:  Medications   methylPREDNISolone sodium succ (SOLU-MEDROL) injection 40 mg (40 mg IntraVENous Given 10/9/23 0515)   ipratropium 0.5 mg-albuterol 2.5 mg (DUONEB) nebulizer solution 1 Dose (1 Dose Inhalation Given 10/9/23 0512)   furosemide (LASIX) injection 60 mg (60 mg IntraVENous Given 10/9/23 0724)             Is this patient to be included in the SEP-1 Core Measure due to severe sepsis or septic shock? No   Exclusion criteria - the patient is NOT to be included for SEP-1 Core Measure due to: Infection is not suspected    Chronic Conditions:     CONSULTS: (Who and What was discussed)  IP CONSULT TO NEPHROLOGY  IP CONSULT TO PULMONOLOGY                CC/HPI Summary, DDx, ED Course, and Reassessment: Adult male who comes in by EMS for shortness of breath. The patient was seen as a medical resuscitation code and brought to the trauma bay. He was recent cardioversion pulse oximetry monitoring. He was placed on BiPAP. EMS had placed him on CPAP in route. Basic laboratory studies chest x-ray and EKG ordered. The patient is given IV Solu-Medrol, he is also given 1 DuoNeb here. Later in his ED stay the patient is given 60 mg of IV Lasix. Patient studies reviewed he has a care plan in place. Following his ED evaluation which demonstrated more chronic findings plan was to get the patient off his BiPAP and get him to dialysis however the patient did not tolerate BiPAP he appeared very comfortable by habitus and his BiPAP was discontinued the patient appeared to have increased work of breathing and syndrome comfortable. His oxygen saturation started to trend downwards. I am very uncomfortable discharging this patient as I believe that he should be further evaluated possible biopsy at home.   Patient expresses understanding and is agreeable

## 2023-10-09 NOTE — H&P
V2.0  History and Physical      Name:  Hari Rojas /Age/Sex: 1970  (52 y.o. male)   MRN & CSN:  [id number] & [id number] Encounter Date/Time: 10/9/2023 1:28 PM EDT   Location:  6192/6024-77 PCP: None, None       Hospital Day: 1    Assessment and Plan:   Hari Rojas is a 52 y.o. male with a pmh of ESRD on hemodialysis, noncompliant, CAD, hypertension, hyperlipidemia, COPD, CHF with moderately reduced ejection fraction, asthma, bilateral hilar adenopathies, history of aortic stenosis, chronic GERD, history of CVA who presents with ESRD (end stage renal disease) [facility])    Hospital Problems             Last Modified POA    * (Principal) ESRD (end stage renal disease) ([address]) 10/9/2023 Yes       Acute hypoxic respiratory failure in the setting of fluid overload with underlying ESRD on hemodialysis: Patient reports last hemodialysis was on Friday. Today is Monday. Nephrology has been consulted. On a schedule of  and Friday of hemodialysis. Reportedly does not miss dialysis? Multiple visits to the hospital ED in the last several months with similar problems of fluid overload. Has a case plan on chart as well. Nephrology has been consulted plan for hemodialysis and continue to monitor. Pulmonology consult has been placed as well keeping in mind the fact that the patient is requiring BiPAP and has nonavailability of BiPAP at home. Would benefit from BiPAP at home  Recent discharge from the hospital for sepsis secondary to pneumonia: Leukocytosis identified. Likely looks more reactive. I will hold off on antibiotics. If worsening leukocytosis consider antibiotics? Looks more fluid overload. Chronic systolic heart failure with EF reduced ejection fraction currently volume overloaded uses torsemide at home.   We will leave the decision of resuming torsemide per nephrology  ESRD on hemodialysis   Insulin-dependent diabetes mellitus type 2 on sliding scale evaluation. Combination of summation density and left basilar opacity is again noted with obscuration of the diaphragm and portion of the cardiac border. May relate to a combination of pleural effusion with atelectasis or pneumonia. Pulmonary vascular congestion/volume overload is again present. Left-sided dual lumen venous catheter with the tip at the cavoatrial junction.        Personally reviewed Lab Studies, Imaging, and discussed case with     Electronically signed by Aron Gutierrez MD on 10/9/2023 at 1:28 PM

## 2023-10-09 NOTE — ED NOTES
Report called to [address] [place] [address],[address] on [address].      Tanya Moore RN  10/09/23 9978

## 2023-10-09 NOTE — TELEPHONE ENCOUNTER
Writer contacted ED provider to inform of 30 day readmission risk. Writer's attempt to contact ED provider was unsuccessful.     Call Back: If you need to call back to inform of disposition you can contact me at [phone]

## 2023-10-09 NOTE — PROGRESS NOTES
10/09/23 0450   NIV Type   $NIV $Daily Charge   NIV Started/Stopped On   Equipment Type v60   Mode Bilevel   Mask Type Full face mask   Mask Size Medium   Assessment   Respirations 21   SpO2 95 %   Comfort Level Good   Using Accessory Muscles Yes   Mask Compliance Good   Skin Assessment Clean, dry, & intact   Settings/Measurements   PIP Observed 22 cm H20   IPAP 18 cmH20   CPAP/EPAP 5 cmH2O   Vt (Measured) 804 mL   Rate Ordered 12   FiO2  50 %   I Time/ I Time % 1 s   Minute Volume (L/min) 15.8 Liters   Mask Leak (lpm) 14 lpm   Patient's Home Machine No   Alarm Settings   Alarms On Y   Low Pressure (cmH2O) 6 cmH2O   High Pressure (cmH2O) 30 cmH2O   RR Low (bpm) 6   RR High (bpm) 40 br/min

## 2023-10-09 NOTE — CONSULTS
Pulmonary New Consultation       Patient Name:  Scottie Olguin ADMISSION/CC: Shortness of breath    PCP: None, None    HISTORY OF PRESENT ILLNESS:   52y.o. year old male with significant past medical history of heart failure reduced EF, end-stage renal disease on hemodialysis presents to [facility] for shortness of breath. Patient says that he had acute shortness of breath 1 day prior to admission. He denies any additional symptoms such as fever, chills, chest pain, nausea, vomiting, cough. He has had multiple admissions in the past for acute on chronic hypoxic and hypercapnic respiratory failure. He has no other complaints. Patient quit smoking 1 month ago, he says that he has been smoking 1 pack/day for 20 years. He denies any illicit drug use, denies any alcohol use. He worked in plumbing in the past, he denies any occupational exposures. He does not have any pets/birds at home, he denies any household exposures.     Past Medical History:   Diagnosis Date    Ambulatory dysfunction     walker for long distances, SOB with distance    Aortic stenosis     echo 2017    Arthritis     hands and hips    Asthma     Bilateral hilar adenopathy syndrome 6/3/2013    CAD (coronary artery disease)     Dr. Madhav Givens [facility]) 04/19/2019    EF= 43%    CHF (congestive heart failure) ([address])     Chronic pain     COPD (chronic obstructive pulmonary disease) ([address])     pulmonology Dr. Leonard Worrell    Depression     Diabetes mellitus ([address])     borderline    Difficult intravenous access     Emphysema of lung ([address])     ESRD (end stage renal disease) on dialysis ([address])     MWF    Fear of needles     Gastric ulcer     GERD (gastroesophageal reflux disease)     Heart valve problem     bicuspic valve    Hemodialysis patient ([address])     History of spinal fracture     work incident    Hx of blood clots     Bilateral lower extremities; stents in place    Hyperlipidemia     Hypertension     MI

## 2023-10-09 NOTE — ED NOTES
Bipap discontinued at this time. Back on home oxygen, 4 liters.      Torsten Talavera RN  10/09/23 6247

## 2023-10-09 NOTE — PROGRESS NOTES
10/09/23 0853   NIV Type   $NIV $Daily Charge   Equipment Type v60   Mode Bilevel   Mask Type Full face mask   Mask Size Medium   Assessment   Pulse (!) 102   Respirations 19   SpO2 100 %   Settings/Measurements   PIP Observed 21 cm H20   IPAP 18 cmH20   CPAP/EPAP 5 cmH2O   Vt (Measured) 876 mL   Rate Ordered 23   Insp Rise Time (%) 1 %   FiO2  50 %   I Time/ I Time % 1 s   Minute Volume (L/min) 20.3 Liters   Mask Leak (lpm) 2 lpm   Patient's Home Machine No

## 2023-10-09 NOTE — ED NOTES
Pt requesting to be placed back on bipap d/t increased work of breathing. No change to bipap settings. Pt states has not been on a diuretic for \"a couple months\", states no doctor ever wrote him a refill therefore he believed he did not need it. States compliant with dialysis. Has not seen his pulmonologist in over a year because \"they never called back\" when he tried to make an appointment.      Almas Moralez RN  10/09/23 9769

## 2023-10-10 LAB
ALBUMIN SERPL-MCNC: 3.5 G/DL (ref 3.4–5)
ANION GAP SERPL CALCULATED.3IONS-SCNC: 13 MMOL/L (ref 3–16)
BUN SERPL-MCNC: 37 MG/DL (ref 7–20)
CALCIUM SERPL-MCNC: 9 MG/DL (ref 8.3–10.6)
CHLORIDE SERPL-SCNC: 90 MMOL/L (ref 99–110)
CO2 SERPL-SCNC: 27 MMOL/L (ref 21–32)
CREAT SERPL-MCNC: 4.9 MG/DL (ref 0.9–1.3)
DEPRECATED RDW RBC AUTO: 17.5 % (ref 12.4–15.4)
EST. AVERAGE GLUCOSE BLD GHB EST-MCNC: 174.3 MG/DL
GFR SERPLBLD CREATININE-BSD FMLA CKD-EPI: 13 ML/MIN/{1.73_M2}
GLUCOSE BLD-MCNC: 169 MG/DL (ref 70–99)
GLUCOSE BLD-MCNC: 206 MG/DL (ref 70–99)
GLUCOSE BLD-MCNC: 213 MG/DL (ref 70–99)
GLUCOSE SERPL-MCNC: 271 MG/DL (ref 70–99)
HBA1C MFR BLD: 7.7 %
HCT VFR BLD AUTO: 30.2 % (ref 40.5–52.5)
HGB BLD-MCNC: 9.9 G/DL (ref 13.5–17.5)
MCH RBC QN AUTO: 29.7 PG (ref 26–34)
MCHC RBC AUTO-ENTMCNC: 32.7 G/DL (ref 31–36)
MCV RBC AUTO: 91 FL (ref 80–100)
PERFORMED ON: ABNORMAL
PHOSPHATE SERPL-MCNC: 5.4 MG/DL (ref 2.5–4.9)
PLATELET # BLD AUTO: 224 K/UL (ref 135–450)
PMV BLD AUTO: 7.8 FL (ref 5–10.5)
POTASSIUM SERPL-SCNC: 4.2 MMOL/L (ref 3.5–5.1)
RBC # BLD AUTO: 3.32 M/UL (ref 4.2–5.9)
SODIUM SERPL-SCNC: 130 MMOL/L (ref 136–145)
WBC # BLD AUTO: 7 K/UL (ref 4–11)

## 2023-10-10 PROCEDURE — 6370000000 HC RX 637 (ALT 250 FOR IP): Performed by: STUDENT IN AN ORGANIZED HEALTH CARE EDUCATION/TRAINING PROGRAM

## 2023-10-10 PROCEDURE — 2580000003 HC RX 258: Performed by: STUDENT IN AN ORGANIZED HEALTH CARE EDUCATION/TRAINING PROGRAM

## 2023-10-10 PROCEDURE — 94640 AIRWAY INHALATION TREATMENT: CPT

## 2023-10-10 PROCEDURE — 85027 COMPLETE CBC AUTOMATED: CPT

## 2023-10-10 PROCEDURE — 2700000000 HC OXYGEN THERAPY PER DAY

## 2023-10-10 PROCEDURE — 36415 COLL VENOUS BLD VENIPUNCTURE: CPT

## 2023-10-10 PROCEDURE — 94761 N-INVAS EAR/PLS OXIMETRY MLT: CPT

## 2023-10-10 PROCEDURE — 94660 CPAP INITIATION&MGMT: CPT

## 2023-10-10 PROCEDURE — 80069 RENAL FUNCTION PANEL: CPT

## 2023-10-10 PROCEDURE — 2060000000 HC ICU INTERMEDIATE R&B

## 2023-10-10 PROCEDURE — 99233 SBSQ HOSP IP/OBS HIGH 50: CPT | Performed by: STUDENT IN AN ORGANIZED HEALTH CARE EDUCATION/TRAINING PROGRAM

## 2023-10-10 RX ORDER — ACETAMINOPHEN 650 MG
TABLET, EXTENDED RELEASE ORAL DAILY
Status: DISCONTINUED | OUTPATIENT
Start: 2023-10-10 | End: 2023-10-11 | Stop reason: HOSPADM

## 2023-10-10 RX ADMIN — DULOXETINE HYDROCHLORIDE 60 MG: 60 CAPSULE, DELAYED RELEASE ORAL at 11:39

## 2023-10-10 RX ADMIN — IPRATROPIUM BROMIDE AND ALBUTEROL SULFATE 1 DOSE: 2.5; .5 SOLUTION RESPIRATORY (INHALATION) at 08:14

## 2023-10-10 RX ADMIN — IPRATROPIUM BROMIDE AND ALBUTEROL SULFATE 1 DOSE: 2.5; .5 SOLUTION RESPIRATORY (INHALATION) at 15:17

## 2023-10-10 RX ADMIN — OXYCODONE HYDROCHLORIDE 5 MG: 5 TABLET ORAL at 20:47

## 2023-10-10 RX ADMIN — IPRATROPIUM BROMIDE AND ALBUTEROL SULFATE 1 DOSE: 2.5; .5 SOLUTION RESPIRATORY (INHALATION) at 11:31

## 2023-10-10 RX ADMIN — INSULIN LISPRO 1 UNITS: 100 INJECTION, SOLUTION INTRAVENOUS; SUBCUTANEOUS at 11:39

## 2023-10-10 RX ADMIN — METOPROLOL SUCCINATE 100 MG: 50 TABLET, EXTENDED RELEASE ORAL at 20:47

## 2023-10-10 RX ADMIN — TORSEMIDE 100 MG: 100 TABLET ORAL at 11:39

## 2023-10-10 RX ADMIN — ISOSORBIDE DINITRATE 20 MG: 20 TABLET ORAL at 20:47

## 2023-10-10 RX ADMIN — METOPROLOL SUCCINATE 100 MG: 50 TABLET, EXTENDED RELEASE ORAL at 11:39

## 2023-10-10 RX ADMIN — QUETIAPINE FUMARATE 50 MG: 25 TABLET ORAL at 20:47

## 2023-10-10 RX ADMIN — APIXABAN 5 MG: 5 TABLET, FILM COATED ORAL at 20:47

## 2023-10-10 RX ADMIN — PANTOPRAZOLE SODIUM 40 MG: 40 TABLET, DELAYED RELEASE ORAL at 11:39

## 2023-10-10 RX ADMIN — IPRATROPIUM BROMIDE AND ALBUTEROL SULFATE 1 DOSE: 2.5; .5 SOLUTION RESPIRATORY (INHALATION) at 19:35

## 2023-10-10 RX ADMIN — PRAVASTATIN SODIUM 40 MG: 40 TABLET ORAL at 20:47

## 2023-10-10 RX ADMIN — TRAZODONE HYDROCHLORIDE 50 MG: 50 TABLET ORAL at 20:47

## 2023-10-10 RX ADMIN — APIXABAN 5 MG: 5 TABLET, FILM COATED ORAL at 11:39

## 2023-10-10 RX ADMIN — ISOSORBIDE DINITRATE 20 MG: 20 TABLET ORAL at 11:39

## 2023-10-10 RX ADMIN — CLOPIDOGREL BISULFATE 75 MG: 75 TABLET ORAL at 11:39

## 2023-10-10 ASSESSMENT — PAIN SCALES - GENERAL: PAINLEVEL_OUTOF10: 7

## 2023-10-10 ASSESSMENT — PAIN DESCRIPTION - LOCATION: LOCATION: BACK

## 2023-10-10 NOTE — PROGRESS NOTES
10/10/23 0453   NIV Type   Equipment Type v60   Mode Bilevel   Mask Type Full face mask   Mask Size Medium   Assessment   Respirations 16   Comfort Level Good   Using Accessory Muscles No   Mask Compliance Good   Settings/Measurements   PIP Observed 12 cm H20   IPAP 12 cmH20  (12/5 per MD order)   CPAP/EPAP 5 cmH2O   Vt (Measured) 526 mL   Rate Ordered 12   FiO2  35 %   I Time/ I Time % 1 s   Minute Volume (L/min) 8.2 Liters   Mask Leak (lpm) 15 lpm   Patient's Home Machine No   Alarm Settings   Alarms On Y   Low Pressure (cmH2O) 6 cmH2O   High Pressure (cmH2O) 30 cmH2O   Apnea (secs) 20 secs   RR Low (bpm) 6   RR High (bpm) 40 br/min

## 2023-10-10 NOTE — CARE COORDINATION
CM update; LOS # 1 Call placed ro [facility] with contact numbers for call back r/t BiPAP needs. Will follow. Steven Sam RN

## 2023-10-10 NOTE — PROGRESS NOTES
10/10/23 0815   NIV Type   NIV Started/Stopped On   Equipment Type 6v60   Mode Bilevel   Mask Type Full face mask   Mask Size Medium   Assessment   Skin Assessment Clean, dry, & intact   Skin Protection for O2 Device Yes   Settings/Measurements   PIP Observed 13 cm H20   IPAP 12 cmH20   CPAP/EPAP 5 cmH2O   Vt (Measured) 553 mL   Rate Ordered 12   FiO2  35 %   I Time/ I Time % 1 s   Minute Volume (L/min) 11.6 Liters   Mask Leak (lpm) 0 lpm

## 2023-10-10 NOTE — PROGRESS NOTES
10/10/23 1518   NIV Type   NIV Started/Stopped On   Equipment Type v60   Mode Bilevel   Mask Type Full face mask   Mask Size Medium   Settings/Measurements   PIP Observed 13 cm H20   IPAP 12 cmH20   CPAP/EPAP 5 cmH2O   Vt (Measured) 502 mL   Rate Ordered 12   FiO2  35 %   I Time/ I Time % 1 s   Minute Volume (L/min) 7.7 Liters   Mask Leak (lpm) 0 lpm

## 2023-10-10 NOTE — PLAN OF CARE
CHF Care Plan      Patient's EF (Ejection Fraction) is less than 40%    Heart Failure Medications:  Diuretics[de-identified] Torsemide    (One of the following REQUIRED for EF </= 40%/SYSTOLIC FAILURE but MAY be used in EF% >40%/DIASTOLIC FAILURE)        ACE[de-identified] None        ARB[de-identified] None         ARNI[de-identified] None    (Beta Blockers)  NON- Evidenced Based Beta Blocker (for EF% >40%/DIASTOLIC FAILURE): None    Evidenced Based Beta Blocker::(REQUIRED for EF% <40%/SYSTOLIC FAILURE) Metoprolol SUCCinate- Toprol XL  . .................................................................................................................................................. Healthy Weight Tracking - BMI + Meds 10/1/2023 10/2/2023 10/2/2023 10/2/2023 10/9/2023 10/9/2023 10/9/2023   Weight (No Data) 229 lb 14.4 oz 218 lb 4.1 oz 214 lb 1.1 oz 214 lb 212 lb 1.3 oz 205 lb 7.5 oz   Height - - - - 5' 9\" - -   Body Mass Index - 33.95 kg/m2 32.23 kg/m2 31.61 kg/m2 31.6 kg/m2 31.32 kg/m2 30.34 kg/m2   Some recent data might be hidden         Patient's weights and intake/output reviewed: Yes    Daily Weight log at bedside, patient/family participation in use of log: pt unable to participate\" (pt refused)    Patient's Last Weight: 93.2 kg obtained by standing scale. No AM weight. Pt refused.      No intake or output data in the 24 hours ending 10/10/23 1246    Education Booklet Provided: yes    Comorbidities Reviewed  yes    Patient has a past medical history of Ambulatory dysfunction, Aortic stenosis, Arthritis, Asthma, Bilateral hilar adenopathy syndrome, CAD (coronary artery disease), Cardiomyopathy ([address]), CHF (congestive heart failure) ([address]), Chronic pain, COPD (chronic obstructive pulmonary disease) ([address]), Depression, Diabetes mellitus ([address]), Difficult intravenous access, Emphysema of lung ([address]), ESRD (end stage renal disease) on dialysis ([address]), Fear of needles, Gastric ulcer, GERD (gastroesophageal reflux disease), Heart valve problem, Hemodialysis patient ([address]),

## 2023-10-10 NOTE — PROGRESS NOTES
10/10/23 1131   NIV Type   NIV Started/Stopped On   Equipment Type v60   Mode Bilevel   Mask Type Full face mask   Mask Size Medium   Settings/Measurements   PIP Observed 12 cm H20   IPAP 12 cmH20   CPAP/EPAP 5 cmH2O   Vt (Measured) 648 mL   Rate Ordered 12   FiO2  35 %   I Time/ I Time % 1 s   Minute Volume (L/min) 8.8 Liters   Mask Leak (lpm) 0 lpm

## 2023-10-10 NOTE — PROGRESS NOTES
10/10/23 0327   NIV Type   $NIV $Daily Charge   Equipment Type v60   Mode Bilevel   Mask Type Full face mask   Mask Size Medium   Assessment   Respirations 16   SpO2 96 %   Comfort Level Good   Using Accessory Muscles No   Mask Compliance Good   Settings/Measurements   PIP Observed 19 cm H20   IPAP 18 cmH20   CPAP/EPAP 8 cmH2O   Vt (Measured) 690 mL   Rate Ordered 12   FiO2  35 %   I Time/ I Time % 1 s   Minute Volume (L/min) 10.5 Liters   Mask Leak (lpm) 19 lpm   Patient's Home Machine No   Alarm Settings   Alarms On Y   Low Pressure (cmH2O) 6 cmH2O   High Pressure (cmH2O) 30 cmH2O   Apnea (secs) 20 secs   RR Low (bpm) 6   RR High (bpm) 40 br/min

## 2023-10-10 NOTE — PROGRESS NOTES
Spoke with Tova Zafar, about patient's home NIV. I explained that the patient's wife, Jane Cohen, mentioned that their home unit was being recalled/wasn't working. According to Jane Cohen, there was a rep (not sure if they were from [facility]) that went to their house to exchange their old NIV unit for a new one, but the old one couldn't be found at that time so they never got the new one. From [facility] documentation, she believes they should have a working NIV unit already and hadn't heard that the patient needed a new one. She will look into the situation further tomorrow, talk to Jane Cohen, and contact Dana, [address].

## 2023-10-10 NOTE — CONSULTS
Deon Velazquez Wound Ostomy Continence Nurse  Consult Note       NAME:  Antonella Loving  MEDICAL RECORD NUMBER:  [id number]  AGE: 52 y.o. GENDER: male  : 1970  TODAY'S DATE:  10/10/2023    Subjective; Patient resting with bi pap mask on   Reason for WOCN Evaluation and Assessment:    R heel & 2nd toe wound treatment recaguila Loving is a 52 y.o. male referred by:   [] Physician  [x] Nursing  [] Other:     Wound Identification:  Wound Type: diabetic and pressure  Contributing Factors: diabetes, poor glucose control, chronic pressure, decreased mobility, and shear force    Wound History: Patient last seen by this Wound Care nurse 23 for same areas L heel & lateral left foot. Patient has been hospitalized several times respiratory issue.   Current Wound Care Treatment:  dry dressing    Patient Goal of Care:  [] Wound Healing  [] Odor Control  [] Palliative Care  [] Pain Control   [] Other:         PAST MEDICAL HISTORY        Diagnosis Date    Ambulatory dysfunction     walker for long distances, SOB with distance    Aortic stenosis     echo     Arthritis     hands and hips    Asthma     Bilateral hilar adenopathy syndrome 6/3/2013    CAD (coronary artery disease)     Dr. Lama [facility]) 2019    EF= 43%    CHF (congestive heart failure) ([address])     Chronic pain     COPD (chronic obstructive pulmonary disease) ([address])     pulmonology Dr. Emmie Goyal    Depression     Diabetes mellitus ([address])     borderline    Difficult intravenous access     Emphysema of lung ([address])     ESRD (end stage renal disease) on dialysis ([address])     MWF    Fear of needles     Gastric ulcer     GERD (gastroesophageal reflux disease)     Heart valve problem     bicuspic valve    Hemodialysis patient ([address])     History of spinal fracture     work incident    Hx of blood clots     Bilateral lower extremities; stents in place    Hyperlipidemia     Hypertension     MI (myocardial infarction) ([address])  erythema; Intact 10/10/23 1421   Margins Attached edges 10/10/23 1421   Wound Thickness Description not for Pressure Injury Full thickness 10/10/23 1421   Number of days: 94           Response to treatment:  Well tolerated by patient. Pain Assessment:  Severity:  0 / 10  Quality of pain: N/A  Wound Pain Timing/Severity: none  Premedicated: No    Plan   Plan of Care: Wound 07/07/23 Heel Right-Dressing/Treatment: Betadine swabs/povidone iodine, Foam  Wound 05/22/23 Foot Right;Lateral dry-Dressing/Treatment: Betadine swabs/povidone iodine, Foam    Daily cleanse left heel and lateral left foot with normal saline, pat dry. Apply Betadine Solution to dry areas and cover with foam.  Monitor for drainage, order, edema or signs and symptoms of infection. Call wound care for deterioration [phone] or call [phone] and leave message. Specialty Bed Required : Yes   [x] Low Air Loss   [x] Pressure Redistribution  [] Fluid Immersion  [] Bariatric  [] Total Pressure Relief  [] Other:     Current Diet: ADULT DIET;  Regular; 5 carb choices (75 gm/meal)  Dietician consult:  Yes    Discharge Plan:  Placement for patient upon discharge: home with support    Patient appropriate for Outpatient [address]: No    Referrals:  [x]  / discharge planner following  [] [address] [facility]  [] Supplies  [] Other    Patient/Caregiver Teaching:  Level of patient/caregiver understanding able to:   [] Indicates understanding       [] Needs reinforcement  [] Unsuccessful      [x] Verbal Understanding  [] Demonstrated understanding       [] No evidence of learning  [] Refused teaching         [] N/A       Electronically signed by Lisa Irving, RN, BSN on 10/10/2023 at 2:23 PM

## 2023-10-10 NOTE — PROGRESS NOTES
10/09/23 2323   NIV Type   Equipment Type v60   Mode Bilevel   Mask Type Full face mask   Mask Size Medium   Assessment   Respirations 18   SpO2 97 %   Skin Assessment Clean, dry, & intact   Skin Protection for O2 Device No  (pt does not want mepilex)   Settings/Measurements   PIP Observed 19 cm H20   IPAP 18 cmH20   CPAP/EPAP 8 cmH2O   Vt (Measured) 595 mL   Rate Ordered 12   FiO2  35 %   I Time/ I Time % 1 s   Minute Volume (L/min) 10.2 Liters   Mask Leak (lpm) 0 lpm   Patient's Home Machine No   Alarm Settings   Alarms On Y   Low Pressure (cmH2O) 6 cmH2O   High Pressure (cmH2O) 30 cmH2O   Apnea (secs) 20 secs   RR Low (bpm) 6   RR High (bpm) 40 br/min

## 2023-10-11 VITALS
WEIGHT: 207.89 LBS | BODY MASS INDEX: 30.79 KG/M2 | DIASTOLIC BLOOD PRESSURE: 84 MMHG | SYSTOLIC BLOOD PRESSURE: 153 MMHG | RESPIRATION RATE: 16 BRPM | HEIGHT: 69 IN | OXYGEN SATURATION: 95 % | TEMPERATURE: 98 F | HEART RATE: 68 BPM

## 2023-10-11 LAB
ALBUMIN SERPL-MCNC: 3.5 G/DL (ref 3.4–5)
ANION GAP SERPL CALCULATED.3IONS-SCNC: 17 MMOL/L (ref 3–16)
BUN SERPL-MCNC: 57 MG/DL (ref 7–20)
CALCIUM SERPL-MCNC: 8.6 MG/DL (ref 8.3–10.6)
CHLORIDE SERPL-SCNC: 90 MMOL/L (ref 99–110)
CO2 SERPL-SCNC: 25 MMOL/L (ref 21–32)
CREAT SERPL-MCNC: 7.5 MG/DL (ref 0.9–1.3)
DEPRECATED RDW RBC AUTO: 17.5 % (ref 12.4–15.4)
GFR SERPLBLD CREATININE-BSD FMLA CKD-EPI: 8 ML/MIN/{1.73_M2}
GLUCOSE BLD-MCNC: 187 MG/DL (ref 70–99)
GLUCOSE BLD-MCNC: 208 MG/DL (ref 70–99)
GLUCOSE BLD-MCNC: 256 MG/DL (ref 70–99)
GLUCOSE SERPL-MCNC: 217 MG/DL (ref 70–99)
HCT VFR BLD AUTO: 30 % (ref 40.5–52.5)
HGB BLD-MCNC: 9.7 G/DL (ref 13.5–17.5)
MCH RBC QN AUTO: 29.2 PG (ref 26–34)
MCHC RBC AUTO-ENTMCNC: 32.3 G/DL (ref 31–36)
MCV RBC AUTO: 90.6 FL (ref 80–100)
PERFORMED ON: ABNORMAL
PHOSPHATE SERPL-MCNC: 7.2 MG/DL (ref 2.5–4.9)
PLATELET # BLD AUTO: 239 K/UL (ref 135–450)
PMV BLD AUTO: 8.3 FL (ref 5–10.5)
POTASSIUM SERPL-SCNC: 4.7 MMOL/L (ref 3.5–5.1)
RBC # BLD AUTO: 3.31 M/UL (ref 4.2–5.9)
SODIUM SERPL-SCNC: 132 MMOL/L (ref 136–145)
WBC # BLD AUTO: 7.1 K/UL (ref 4–11)

## 2023-10-11 PROCEDURE — P9047 ALBUMIN (HUMAN), 25%, 50ML: HCPCS

## 2023-10-11 PROCEDURE — 85027 COMPLETE CBC AUTOMATED: CPT

## 2023-10-11 PROCEDURE — 2580000003 HC RX 258: Performed by: STUDENT IN AN ORGANIZED HEALTH CARE EDUCATION/TRAINING PROGRAM

## 2023-10-11 PROCEDURE — 83550 IRON BINDING TEST: CPT

## 2023-10-11 PROCEDURE — 6370000000 HC RX 637 (ALT 250 FOR IP): Performed by: STUDENT IN AN ORGANIZED HEALTH CARE EDUCATION/TRAINING PROGRAM

## 2023-10-11 PROCEDURE — 94640 AIRWAY INHALATION TREATMENT: CPT

## 2023-10-11 PROCEDURE — 80069 RENAL FUNCTION PANEL: CPT

## 2023-10-11 PROCEDURE — 94660 CPAP INITIATION&MGMT: CPT

## 2023-10-11 PROCEDURE — 94761 N-INVAS EAR/PLS OXIMETRY MLT: CPT

## 2023-10-11 PROCEDURE — 99233 SBSQ HOSP IP/OBS HIGH 50: CPT | Performed by: STUDENT IN AN ORGANIZED HEALTH CARE EDUCATION/TRAINING PROGRAM

## 2023-10-11 PROCEDURE — 90935 HEMODIALYSIS ONE EVALUATION: CPT

## 2023-10-11 PROCEDURE — 6360000002 HC RX W HCPCS

## 2023-10-11 PROCEDURE — 2700000000 HC OXYGEN THERAPY PER DAY

## 2023-10-11 PROCEDURE — 83540 ASSAY OF IRON: CPT

## 2023-10-11 RX ORDER — HEPARIN SODIUM 1000 [USP'U]/ML
INJECTION, SOLUTION INTRAVENOUS; SUBCUTANEOUS
Status: DISCONTINUED
Start: 2023-10-11 | End: 2023-10-11 | Stop reason: HOSPADM

## 2023-10-11 RX ORDER — ALBUMIN (HUMAN) 12.5 G/50ML
25 SOLUTION INTRAVENOUS ONCE
Status: COMPLETED | OUTPATIENT
Start: 2023-10-11 | End: 2023-10-11

## 2023-10-11 RX ORDER — HEPARIN SODIUM 5000 [USP'U]/ML
3600 INJECTION, SOLUTION INTRAVENOUS; SUBCUTANEOUS ONCE AS NEEDED
Status: DISCONTINUED | OUTPATIENT
Start: 2023-10-11 | End: 2023-10-11 | Stop reason: HOSPADM

## 2023-10-11 RX ORDER — ALBUMIN (HUMAN) 12.5 G/50ML
SOLUTION INTRAVENOUS
Status: COMPLETED
Start: 2023-10-11 | End: 2023-10-11

## 2023-10-11 RX ADMIN — TORSEMIDE 100 MG: 100 TABLET ORAL at 18:22

## 2023-10-11 RX ADMIN — APIXABAN 5 MG: 5 TABLET, FILM COATED ORAL at 09:28

## 2023-10-11 RX ADMIN — METOPROLOL SUCCINATE 100 MG: 50 TABLET, EXTENDED RELEASE ORAL at 09:28

## 2023-10-11 RX ADMIN — OXYCODONE HYDROCHLORIDE 5 MG: 5 TABLET ORAL at 09:51

## 2023-10-11 RX ADMIN — DULOXETINE HYDROCHLORIDE 60 MG: 60 CAPSULE, DELAYED RELEASE ORAL at 18:22

## 2023-10-11 RX ADMIN — ISOSORBIDE DINITRATE 20 MG: 20 TABLET ORAL at 09:28

## 2023-10-11 RX ADMIN — SODIUM CHLORIDE, PRESERVATIVE FREE 10 ML: 5 INJECTION INTRAVENOUS at 09:29

## 2023-10-11 RX ADMIN — ALBUMIN (HUMAN) 25 G: 0.25 INJECTION, SOLUTION INTRAVENOUS at 15:11

## 2023-10-11 RX ADMIN — IPRATROPIUM BROMIDE AND ALBUTEROL SULFATE 1 DOSE: 2.5; .5 SOLUTION RESPIRATORY (INHALATION) at 11:43

## 2023-10-11 RX ADMIN — INSULIN LISPRO 1 UNITS: 100 INJECTION, SOLUTION INTRAVENOUS; SUBCUTANEOUS at 12:23

## 2023-10-11 RX ADMIN — CLOPIDOGREL BISULFATE 75 MG: 75 TABLET ORAL at 18:22

## 2023-10-11 RX ADMIN — IPRATROPIUM BROMIDE AND ALBUTEROL SULFATE 1 DOSE: 2.5; .5 SOLUTION RESPIRATORY (INHALATION) at 08:44

## 2023-10-11 RX ADMIN — ALBUMIN (HUMAN) 25 G: 12.5 SOLUTION INTRAVENOUS at 15:11

## 2023-10-11 RX ADMIN — Medication: at 09:57

## 2023-10-11 ASSESSMENT — PAIN SCALES - GENERAL
PAINLEVEL_OUTOF10: 6
PAINLEVEL_OUTOF10: 8
PAINLEVEL_OUTOF10: 8

## 2023-10-11 ASSESSMENT — PAIN - FUNCTIONAL ASSESSMENT
PAIN_FUNCTIONAL_ASSESSMENT: ACTIVITIES ARE NOT PREVENTED

## 2023-10-11 ASSESSMENT — PAIN DESCRIPTION - ORIENTATION
ORIENTATION: LOWER

## 2023-10-11 ASSESSMENT — PAIN DESCRIPTION - PAIN TYPE
TYPE: CHRONIC PAIN
TYPE: CHRONIC PAIN

## 2023-10-11 ASSESSMENT — PAIN DESCRIPTION - DESCRIPTORS
DESCRIPTORS: ACHING

## 2023-10-11 ASSESSMENT — PAIN DESCRIPTION - LOCATION
LOCATION: BACK

## 2023-10-11 NOTE — PROGRESS NOTES
Corrie Sawyer MD    Hospitalist Progress Note      Name:  Sergio Isbell /Age/Sex: 1970  (52 y.o. male)   MRN & CSN:  [id number] & [id number] Admission Date/Time: 10/9/2023  4:48 AM   Location:  [id number] PCP: None, None       I saw and examined the patient on 10/11/2023 at 2:19 PM.    Hospital Day: 3  Barriers to discharge: Respiratory failure, BiPAP for home    Patient Active Problem List   Diagnosis    Sepsis without acute organ dysfunction (HCC)    CHF (congestive heart failure) ([facility])    Asthma-COPD overlap syndrome    CAD in native artery    PVD (peripheral vascular disease) ([address])    Bicuspid aortic valve    Bilateral hilar adenopathy syndrome    Claudication in peripheral vascular disease ([address])    Uncontrolled hypertension    Diabetic neuropathy ([address])    Type 2 DM with hypertension and ESRD on dialysis ([address])    Passive smoke exposure    Depression with anxiety    Community acquired pneumonia of left lower lobe of lung    Obesity    ZAINAB on CPAP    Degeneration of lumbar or lumbosacral intervertebral disc    Lumbar radiculopathy    Lumbosacral spondylosis without myelopathy    Biliary colic    Symptomatic cholelithiasis    Gastroparesis due to DM ([facility])    Angina, class IV ([facility])    Dyspnea    Dyslipidemia    Acute on chronic combined systolic and diastolic CHF (congestive heart failure) ([facility])    Ischemic cardiomyopathy    Tobacco abuse    CVA (cerebral vascular accident) ([address])    History of CVA (cerebrovascular accident)    Type 2 diabetes mellitus without complication, without long-term current use of insulin (HCC)    ZAINAB treated with BiPAP    Pleural effusion on left    Chronic anemia    Nonrheumatic aortic valve stenosis    Mucus plugging of bronchi    Hemodialysis-associated hypotension    ESRD (end stage renal disease) on dialysis ([address])    Hypotension due to drugs    Acute diastolic CHF (congestive heart failure) ([facility])    Neuromuscular disorder ([facility])    Renovascular hypertension    Mixed with hypoxia (HCC)    Compression atelectasis    Type 2 myocardial infarction (HCC)    Acute respiratory failure with hypoxemia (HCC)    Hyperkalemia    Hyponatremia    Metabolic acidemia    Pulmonary infiltrate    Leukocytosis    Hypertensive urgency    Severe sepsis (HCC)    Positive blood culture    Chest heaviness    Abnormal cardiovascular stress test    Ulcer with gangrene, with unspecified severity (HCC)    Symptomatic bradycardia    Pulmonary HTN (HCC)    Aortic valve disease    Cardiogenic shock (HCC)    Gangrene of right foot (HCC)    Wound infection    ESRD (end stage renal disease) ([address])    Acute respiratory failure with hypoxia (HCC)    Volume overload    Sepsis ([address])          Assessment and Plan:   Patient is 64-year-old male with ESRD on hemodialysis, obstructive sleep apnea was on CPAP, patient broke 1 year ago, since then noncompliant, COPD, diastolic CHF presented to hospital with shortness of breath, found to have acute hypoxic/hypercapnic respiratory failure. Recent discharge from the hospital with sepsis secondary to pneumonia, completed antibiotics    Acute on chronic hypercapnic/hypoxic respiratory failure requiring BiPAP support, pulmonary evaluation appreciated, plan for discharge home once arrangements for BiPAP at home made, discussed with social work.   Chronic hypoxic respiratory failure on 4 L of oxygen which is his baseline  Severe COPD/emphysema, no acute exacerbation  ESRD on hemodialysis  Acute diastolic heart failure with moderately reduced LVEF due to noncompliance, improving with hemodialysis  Essential arterial hypertension, metoprolol  Hyperlipidemia, statins  History of DVT, on Eliquis  Anxiety and depression, Cymbalta and trazodone  DM-2 with diabetic nephropathy, sliding scale insulin         Subjective:     Seen and examined, shortness of breath has significantly improved with BiPAP at nighttime, his machine broke, social work working on [employer] arrangement for home, will

## 2023-10-11 NOTE — PROGRESS NOTES
Treatment time: 3.5 hrs    Net UF: 2500 ml    Pre weight: 96.7 kg  Post weight: 94.3 kg  EDW: 93 kg    Access used: Lt CW   Access function: Well tolerated, 400 BFR    Medications or blood products given: Albumin 25 g, Heparin dwells    Regular outpatient schedule: MWF    Summary of response to treatment: Pt tolerated well. Pt remained stable throughout entire treatment and upon exiting hemodialysis suite. Copy of dialysis treatment record placed in chart, to be scanned into EMR.

## 2023-10-11 NOTE — PLAN OF CARE
Problem: Chronic Conditions and Co-morbidities  Goal: Patient's chronic conditions and co-morbidity symptoms are monitored and maintained or improved  Recent Flowsheet Documentation  Taken 10/11/2023 0920 by Rodríguez Patricia - Patient's Chronic Conditions and Co-Morbidity Symptoms are Monitored and Maintained or Improved: Monitor and assess patient's chronic conditions and comorbid symptoms for stability, deterioration, or improvement  10/11/2023 0324 by Eliane Jensen RN  Outcome: Progressing     Problem: Discharge Planning  Goal: Discharge to home or other facility with appropriate resources  Recent Flowsheet Documentation  Taken 10/11/2023 0920 by Ulices Stallings RN  Discharge to home or other facility with appropriate resources: Identify barriers to discharge with patient and caregiver  10/11/2023 0324 by Eliane Jensen RN  Outcome: Progressing     Problem: Safety - Adult  Goal: Free from fall injury  10/11/2023 0324 by Eliane Jensen RN  Outcome: Progressing     Problem: ABCDS Injury Assessment  Goal: Absence of physical injury  10/11/2023 0324 by Eliane Jensen RN  Outcome: Progressing     Problem: Pain  Goal: Verbalizes/displays adequate comfort level or baseline comfort level  10/11/2023 0324 by Eliane Jensen RN  Outcome: Progressing     Problem: Respiratory - Adult  Goal: Achieves optimal ventilation and oxygenation  Outcome: Progressing  Flowsheets (Taken 10/11/2023 1514)  Achieves optimal ventilation and oxygenation:   Assess for changes in respiratory status   Assess for changes in mentation and behavior   Position to facilitate oxygenation and minimize respiratory effort     Problem: Cardiovascular - Adult  Goal: Maintains optimal cardiac output and hemodynamic stability  Outcome: Progressing  Goal: Absence of cardiac dysrhythmias or at baseline  Outcome: Progressing     Problem: Metabolic/Fluid and Electrolytes - Adult  Goal: Hemodynamic stability and optimal renal function maintained  Outcome: Progressing  Flowsheets (Taken 10/11/2023 1514)  Hemodynamic stability and optimal renal function maintained:   Monitor labs and assess for signs and symptoms of volume excess or deficit   Monitor intake, output and patient weight     Problem: Metabolic/Fluid and Electrolytes - Adult  Goal: Electrolytes maintained within normal limits  Recent Flowsheet Documentation  Taken 10/11/2023 0920 by Melba Brumfield RN  Electrolytes maintained within normal limits:   Monitor labs and assess patient for signs and symptoms of electrolyte imbalances   Administer electrolyte replacement as ordered

## 2023-10-11 NOTE — PROGRESS NOTES
10/11/23 0011   NIV Type   $NIV $Daily Charge   NIV Started/Stopped On   Equipment Type v60   Mode Bilevel   Mask Type Full face mask   Mask Size Medium   Assessment   Respirations 18   SpO2 99 %   Comfort Level Good   Using Accessory Muscles No   Mask Compliance Good   Skin Assessment Clean, dry, & intact   Settings/Measurements   PIP Observed 12 cm H20   IPAP 12 cmH20   CPAP/EPAP 5 cmH2O   Vt (Measured) 610 mL   Rate Ordered 12   FiO2  35 %   I Time/ I Time % 1 s   Minute Volume (L/min) 10.6 Liters   Mask Leak (lpm) 14 lpm   Patient's Home Machine No   Alarm Settings   Alarms On Y   Low Pressure (cmH2O) 6 cmH2O   High Pressure (cmH2O) 30 cmH2O   RR Low (bpm) 6   RR High (bpm) 40 br/min

## 2023-10-11 NOTE — PROGRESS NOTES
10/11/23 0409   NIV Type   NIV Started/Stopped On   Equipment Type v60   Mode Bilevel   Mask Type Full face mask   Mask Size Medium   Assessment   Respirations 14   SpO2 99 %   Comfort Level Good   Using Accessory Muscles No   Mask Compliance Good   Settings/Measurements   PIP Observed 12 cm H20   IPAP 12 cmH20   CPAP/EPAP 5 cmH2O   Vt (Measured) 508 mL   Rate Ordered 12   FiO2  35 %   I Time/ I Time % 1 s   Minute Volume (L/min) 7.1 Liters   Mask Leak (lpm) 0 lpm   Patient's Home Machine No   Alarm Settings   Alarms On Y   Low Pressure (cmH2O) 6 cmH2O   High Pressure (cmH2O) 30 cmH2O   RR Low (bpm) 6   RR High (bpm) 40 br/min

## 2023-10-11 NOTE — CONSULTS
[address] [facility] 1970    History:  Past Medical History:   Diagnosis Date    Ambulatory dysfunction     walker for long distances, SOB with distance    Aortic stenosis     echo 2017    Arthritis     hands and hips    Asthma     Bilateral hilar adenopathy syndrome 6/3/2013    CAD (coronary artery disease)     Dr. Ed Shi [facility]) 04/19/2019    EF= 43%    CHF (congestive heart failure) ([address])     Chronic pain     COPD (chronic obstructive pulmonary disease) ([address])     pulmonology Dr. Richard Demarco    Depression     Diabetes mellitus ([address])     borderline    Difficult intravenous access     Emphysema of lung ([address])     ESRD (end stage renal disease) on dialysis ([address])     MWF    Fear of needles     Gastric ulcer     GERD (gastroesophageal reflux disease)     Heart valve problem     bicuspic valve    Hemodialysis patient ([address])     History of spinal fracture     work incident    Hx of blood clots     Bilateral lower extremities; stents in place    Hyperlipidemia     Hypertension     MI (myocardial infarction) ([address]) 2019    has had 9 MIs. 2019 was the last    Neuromuscular disorder ([address])     due to CVA    Numbness and tingling in left arm     from fistula    Pneumonia     PONV (postoperative nausea and vomiting)     Prolonged emergence from general anesthesia     States requires more medication than most people    Sleep apnea     Uses CPAP    Stroke ([address])     7mm thalamic cva 2017 deficts left side, left side weakness    TIA (transient ischemic attack)     Unspecified diseases of blood and blood-forming organs        ECHO:  3/13/23   25-30%  HgA1C:  10/9/23   7.7  Iron Saturation:  5/3/23   27  Ferritin: 1/12/22   624.0    Sleep evaluation:  Pulmonologist:  Cpap/bipap: CM working to obtain new bipap    ACE/ARB/ARNI: CKD  BB: Toprol  mg BID   Spironolactone:  Isordil 20 mg TID  SGLT2:    Last Hospital Admission:  9/26/23   [facility] acquired pneumonia  Discharge plans:  to return home with wife    Family Present: none  Advanced Directives: patient has advance directives scanned in the chart  Patient's stated goal of care: reduce shortness of breath prior to discharge; long term goals include symptom management  Lifestyle goal discussed: transportation barriers for dialysis    Patient resting in bed at this time on  4 L O2. Pt denies shortness of breath; no complaints of chest pain. Pt states he lives at home with wife. Mentioned his diet largely consists of no added salt. Pt states he drinks less than 64 ounces of fluid per day. States he takes all his home medications as prescribed. Patient has a scale at home. Patient denies concerns paying for medications. Met with patient at bedside for CHF education. States that he has been doing well at home. States that the transportation to and from HD is helpful \"when they show up\"  states that they dont show up without notice. States his wife gets angry when she has to drive him to dialysis. States that she does it but it is hard on her due to her knees. States that otherwise his treatments at home are going well. Writer offered ongoing support for patient. Patient recent weights and intake/output reviewed:    Patient Vitals for the past 96 hrs (Last 3 readings):   Weight   10/11/23 0600 213 lb 1.6 oz (96.7 kg)   10/09/23 2035 205 lb 7.5 oz (93.2 kg)   10/09/23 1658 212 lb 1.3 oz (96.2 kg)         Intake/Output Summary (Last 24 hours) at 10/11/2023 1415  Last data filed at 10/11/2023 0930  Gross per 24 hour   Intake 400 ml   Output 50 ml   Net 350 ml     Notified patient to call the doctor post discharge if he experiences shortness of breath, chest pain, swelling, cough, or weight gain of 2-3 pounds in a day / 5 pounds in a week. Also notified patient to call the doctor if he feels dizzy, increased fatigue, decreased or difficulty urinating.      Reviewed the red, yellow, green

## 2023-10-11 NOTE — CARE COORDINATION
1235 CM update: LOS# 2; I have made another call and text to [facility] r/t BiPAP needed. No return call at this time. Malaika Woods RN    3020 Call placed to ,[facility] ,r/t BiPAP issue. I explained that discharge is dependent on patient having BiPAP at home. She will check with someone on the status. Malaika Woods RN      0632 Call placed to main number at [facility]. Left message explaining that delivery of BiPAP is the only thing holding up discharge. All contact numbers and times have been provided. Malaika Woods RN    9700 [facility] representative called back and asked for contact information for spouse d/t patient having a non-invasive BiPAP at home at this time. They will call back with update as soon as they talk to her. Malaika Woods RN    6000 [facility] returned call after talking to patient's spouse he has a non-invasive ventilator at home and it is functioning. I have perfect served DR Maricruz Vuong to let him know he is able to be discharged. Malaika Woods RN

## 2023-10-12 ENCOUNTER — FOLLOWUP TELEPHONE ENCOUNTER (OUTPATIENT)
Dept: TELEMETRY | Age: 53
End: 2023-10-12

## 2023-10-12 ENCOUNTER — CARE COORDINATION (OUTPATIENT)
Dept: CASE MANAGEMENT | Age: 53
End: 2023-10-12

## 2023-10-12 LAB
IRON SATN MFR SERPL: 26 % (ref 20–50)
IRON SERPL-MCNC: 47 UG/DL (ref 59–158)
TIBC SERPL-MCNC: 182 UG/DL (ref 260–445)

## 2023-10-12 NOTE — PROGRESS NOTES
Pt D/C per order. IV removed w/o complications, AVS reviewed and given to pt. Pt left unit w/ w/c transport in no distress. All belongings sent w/ pt.

## 2023-10-12 NOTE — DISCHARGE SUMMARY
Discharge Summary    Name:  Julienne Quispe /Age/Sex: 1970  (52 y.o. male)   MRN & CSN:  [id number] & [id number] Admission Date/Time: 10/9/2023  4:48 AM   Attending:  No att. providers found Discharging Physician: Rodríguez Mims MD     Hospital Course:   Julienne Quispe is a 52 y.o.  male  who presents with  ESRD on hemodialysis, obstructive sleep apnea was on CPAP, patient broke 1 year ago, since then noncompliant, COPD, diastolic CHF presented to hospital with shortness of breath, found to have acute hypoxic/hypercapnic respiratory failure. Recent discharge from the hospital with sepsis secondary to pneumonia, completed antibiotics     Acute on chronic hypercapnic/hypoxic respiratory failure requiring BiPAP support, pulmonary evaluation appreciated, discharge home, arrangements for BiPAP at home made. Chronic hypoxic respiratory failure on 4 L of oxygen which is his baseline  Severe COPD/emphysema, no acute exacerbation  ESRD on hemodialysis  Acute diastolic heart failure with moderately reduced LVEF due to noncompliance, improving with hemodialysis  Essential arterial hypertension, metoprolol  Hyperlipidemia, statins  History of DVT, on Eliquis  Anxiety and depression, Cymbalta and trazodone  DM-2 with diabetic nephropathy, sliding scale insulin        The patient expressed appropriate understanding of and agreement with the discharge recommendations, medications, and plan.      Consults this admission:  IP CONSULT TO NEPHROLOGY  IP CONSULT TO PULMONOLOGY  IP CONSULT TO HEART FAILURE NURSE/COORDINATOR  IP CONSULT TO DIETITIAN    Discharge Instruction:     Follow up appointments: ESRD on hemodialysis    -Nephrology to continue hemodialysis  -Pulmonary outpatient for obstructive sleep apnea/severe COPD    Primary care physician:  within 2 weeks    Diet:  renal diet     Activity: activity as tolerated    Disposition: Discharged to:   [x]Home, []HHC, []SNF, []Acute Rehab, []Hospice     Condition on

## 2023-10-12 NOTE — TELEPHONE ENCOUNTER
Second attempt at hospital follow up without answer. No ability to leave . First attempt made by clinical transitions RN.   Abena Torres RN

## 2023-10-12 NOTE — CARE COORDINATION
Care Transitions Outreach Attempt    Call within 2 business days of discharge: Yes   Attempted to reach patient for transitions of care follow up. Unable to reach patient d/t mailbox full. CTN contacted [address] [place] [address] and spoke with Grover Coreas. Gema Ordoñez noted patient's discharge from [facility] on 10/11 and reported he would send a message to have patient seen within the week and as soon as he receives CELINE/AVS. CTN offered to fax CELINE/AVS and Gema Ordoñez provided CTN with fax # [phone]. CTN sent right fax of Cong Irving reported patient's current PCP is Fausto Sandy and last provider that saw patient was Haris Bonds. CTN requested visit as soon as possible     Patient: Sergio Isbell Patient : 1970 MRN: [id number]    Last Discharge Facility       Date Complaint Diagnosis Description Type Department Provider    10/9/23 Shortness of Breath Chronic respiratory failure, unspecified whether with hypoxia or hypercapnia [facility]) ED to Hosp-Admission (Discharged) (ADMITTED) [place] C4 Vj Villa MD; [place]... Was this an external facility discharge? No Discharge Facility Name: n.a    Noted following upcoming appointments from discharge chart review:   [facility] follow up appointment(s): No future appointments. Non-[facility]  follow up appointment(s):  Tessie RALPH, RN, [facility]  Care Transition Nurse  [phone] mobile

## 2023-10-12 NOTE — DISCHARGE INSTR - COC
Continuity of Care Form    Patient Name: Flower Cabrera   :  1970  MRN:  [id number]    Admit date:  10/9/2023  Discharge date:  10/11/23    Code Status Order: Full Code   Advance Directives:     Admitting Physician:  Peggy Saul MD  PCP: None, None    Discharging Nurse: Saurabh Aguirre, [address] Unit/Room#: 7503/6855-90  Discharging Unit Phone Number: [phone]    Emergency Contact:   Extended Emergency Contact Information  Primary Emergency Contact: Crystal Ann [facility]  Address: [address], [address] [facility] [address] [facility] Phone: [phone]  Mobile Phone: [phone]  Relation: Spouse  Secondary Emergency Contact: ALICIA HARO JR. [facility] Phone: [phone]  Mobile Phone: [phone]  Relation: Brother/Sister  Preferred language: English   needed?  No    Past Surgical History:  Past Surgical History:   Procedure Laterality Date    AORTIC VALVE REPLACEMENT N/A 10/15/2019    TRANSCATHETER AORTIC VALVE REPLACEMENT FEMORAL APPROACH performed by Raymundo Cast MD at [address] Right 2019    PERITONEAL DIALYSIS CATHETER REMOVAL performed by Allyn Johnson MD at [address] [place] [address]      WN    CORONARY ANGIOPLASTY WITH STENT PLACEMENT  05/26/15    CYST REMOVAL  2013    EXCISION CYSTS, NECK X2 AND ABDOMINAL benign    DIAGNOSTIC CARDIAC CATH LAB PROCEDURE      DIALYSIS FISTULA CREATION Left 10/30/2017    LEFT BRACHIAL CEPHALIC FISTULA    DIALYSIS FISTULA CREATION Left 3/27/2019    LIGATION  AV FISTULA performed by Jeff Workman MD at [address] [facility] [address] [facility] Right 2023    INCISION AND DEBRIDEMENT RIGHT FOOT WITH performed by Sera Aguilar DPM at [address] [facility] Right 2023    RIGHT FIFTH RAY AMPUTATION performed by Sera Aguilar DPM at [address] [facility]    IR TUNNELED CATHETER PLACEMENT GREATER THAN 5 YEARS 3/21/2022    IR TUNNELED CATHETER PLACEMENT GREATER THAN 5 YEARS 3/21/2022 [facility]    IR TUNNELED CATHETER PLACEMENT GREATER THAN 5 YEARS  4/21/2022    IR TUNNELED CATHETER PLACEMENT GREATER THAN 5 YEARS 4/21/2022 [facility]    IR TUNNELED CATHETER PLACEMENT GREATER THAN 5 YEARS  4/26/2022    IR TUNNELED CATHETER PLACEMENT GREATER THAN 5 YEARS 4/26/2022 [facility]    IR TUNNELED CATHETER PLACEMENT GREATER THAN 5 YEARS  6/23/2022    IR TUNNELED CATHETER PLACEMENT GREATER THAN 5 YEARS 6/23/2022 [facility]    OTHER SURGICAL HISTORY  02/01/2017    laparoscopic cholecystectomy with intraoperative cholangiogram    OTHER SURGICAL HISTORY  2018    PORT PLACEMENT  - vas cath    OTHER SURGICAL HISTORY Bilateral 06/26/2018    laprascopic peritoneal dialysis catheter placement    OTHER SURGICAL HISTORY Right 09/2018    peritoneal dialysis port placed on right side of abdomen    OTHER SURGICAL HISTORY  05/28/2019    PTA/Stenting R External Iliac artery    [place] LAPS INSERTION TUNNELED INTRAPERITONEAL CATHETER N/A 9/21/2018    LAPAROSCOPIC PERITONEAL DIALYSIS CATHETER REPLACEMENT performed by Jagjit Rivero MD at [address] 2/24/2022    PERINEAL ABCESS DRAINAGE performed by Jagjit Rivero MD at [address] [facility] N/A 10/2/2022    THORACENTESIS ULTRASOUND performed by Sonido Khalil MD at [address]  01/06/2016    UPPER GASTROINTESTINAL ENDOSCOPY  01/29/2017    possible candida, otherwise normal appearing    VASCULAR SURGERY  aprx 2 years ago    2 stents placed, each side of groin       Immunization History:   Immunization History   Administered Date(s) Administered    Hep B, ENGERIX-B, (age 20y+), IM, 1mL 11/18/2017, 06/13/2018, 07/13/2018    INFLUENZA, INTRADERMAL, QUADRIVALENT, PRESERVATIVE FREE 11/16/2016    Influenza Virus Vaccine 12/27/2012, 10/07/2014, 11/20/2015,

## 2023-10-13 ENCOUNTER — CARE COORDINATION (OUTPATIENT)
Dept: CASE MANAGEMENT | Age: 53
End: 2023-10-13

## 2023-10-13 ENCOUNTER — FOLLOWUP TELEPHONE ENCOUNTER (OUTPATIENT)
Dept: TELEMETRY | Age: 53
End: 2023-10-13

## 2023-10-13 NOTE — TELEPHONE ENCOUNTER
Care Transitions Initial Follow Up Call    Call within 2 business days of discharge: Yes     Patient: Ramu Hallman Patient : 1970 MRN: [id number]    [unfilled]    RARS: Readmission Risk Score: 52       Spoke with: patient     Discharge department/facility: home    Non-face-to-face services provided:  Scheduled appointment with PCP-this week [facility]    Spoke to patient for hospital follow up. States that he is doing \"alright\" since returning home. Denies any needs at this time. Follow Up  No future appointments.     Abena Torres RN

## 2023-10-13 NOTE — CARE COORDINATION
Care Transitions Initial Follow Up Call    Call within 2 business days of discharge: Yes     Patient: Nika Other Patient : 1970   MRN: [id number]  Reason for Admission: chronic respiratory failure  Discharge Date: 10/11/23 RARS: Readmission Risk Score: 52      Last Discharge Facility       Date Complaint Diagnosis Description Type Department Provider    10/9/23 Shortness of Breath Chronic respiratory failure, unspecified whether with hypoxia or hypercapnia [facility]) ED to Hosp-Admission (Discharged) (ADMITTED) [place] C4 Emre Singh MD; Jasson January... Second and final attempt made to reach patient for initial post hospital transition call. No option to leave message- mailbox is full      Care Transitions 24 Hour Call    Do you have all of your prescriptions and are they filled?: Yes  Do you have support at home?: Partner/Spouse/SO  Are you an active caregiver in your home?: No  Care Transitions Interventions                                   Follow Up  No future appointments.     Gordy Vallejo RN

## 2023-10-23 ENCOUNTER — APPOINTMENT (OUTPATIENT)
Dept: GENERAL RADIOLOGY | Age: 53
DRG: 640 | End: 2023-10-23
Payer: MEDICARE

## 2023-10-23 ENCOUNTER — HOSPITAL ENCOUNTER (INPATIENT)
Age: 53
LOS: 3 days | Discharge: HOME HEALTH | DRG: 640 | End: 2023-10-26
Attending: STUDENT IN AN ORGANIZED HEALTH CARE EDUCATION/TRAINING PROGRAM | Admitting: INTERNAL MEDICINE
Payer: MEDICARE

## 2023-10-23 DIAGNOSIS — E87.5 HYPERKALEMIA: Primary | ICD-10-CM

## 2023-10-23 DIAGNOSIS — N18.6 ESRD (END STAGE RENAL DISEASE) ON DIALYSIS (HCC): ICD-10-CM

## 2023-10-23 DIAGNOSIS — J18.9 PNEUMONIA OF RIGHT LOWER LOBE DUE TO INFECTIOUS ORGANISM: ICD-10-CM

## 2023-10-23 DIAGNOSIS — Z99.2 ESRD (END STAGE RENAL DISEASE) ON DIALYSIS (HCC): ICD-10-CM

## 2023-10-23 DIAGNOSIS — S99.911A INJURY OF RIGHT ANKLE, INITIAL ENCOUNTER: ICD-10-CM

## 2023-10-23 LAB
ALBUMIN SERPL-MCNC: 3.8 G/DL (ref 3.4–5)
ALBUMIN/GLOB SERPL: 1.2 {RATIO} (ref 1.1–2.2)
ALP SERPL-CCNC: 94 U/L (ref 40–129)
ALT SERPL-CCNC: 8 U/L (ref 10–40)
ANION GAP SERPL CALCULATED.3IONS-SCNC: 21 MMOL/L (ref 3–16)
AST SERPL-CCNC: 8 U/L (ref 15–37)
BASOPHILS # BLD: 0 K/UL (ref 0–0.2)
BASOPHILS NFR BLD: 0.5 %
BILIRUB SERPL-MCNC: 0.3 MG/DL (ref 0–1)
BUN SERPL-MCNC: 100 MG/DL (ref 7–20)
CALCIUM SERPL-MCNC: 8.8 MG/DL (ref 8.3–10.6)
CHLORIDE SERPL-SCNC: 86 MMOL/L (ref 99–110)
CO2 SERPL-SCNC: 22 MMOL/L (ref 21–32)
CREAT SERPL-MCNC: 11.3 MG/DL (ref 0.9–1.3)
DEPRECATED RDW RBC AUTO: 17.2 % (ref 12.4–15.4)
EOSINOPHIL # BLD: 0.1 K/UL (ref 0–0.6)
EOSINOPHIL NFR BLD: 1.7 %
GFR SERPLBLD CREATININE-BSD FMLA CKD-EPI: 5 ML/MIN/{1.73_M2}
GLUCOSE BLD-MCNC: 170 MG/DL (ref 70–99)
GLUCOSE BLD-MCNC: 275 MG/DL (ref 70–99)
GLUCOSE BLD-MCNC: 97 MG/DL (ref 70–99)
GLUCOSE SERPL-MCNC: 180 MG/DL (ref 70–99)
HCT VFR BLD AUTO: 31.3 % (ref 40.5–52.5)
HGB BLD-MCNC: 10.2 G/DL (ref 13.5–17.5)
LYMPHOCYTES # BLD: 0.4 K/UL (ref 1–5.1)
LYMPHOCYTES NFR BLD: 5.3 %
MCH RBC QN AUTO: 29.1 PG (ref 26–34)
MCHC RBC AUTO-ENTMCNC: 32.5 G/DL (ref 31–36)
MCV RBC AUTO: 89.4 FL (ref 80–100)
MONOCYTES # BLD: 0.6 K/UL (ref 0–1.3)
MONOCYTES NFR BLD: 7.2 %
NEUTROPHILS # BLD: 6.9 K/UL (ref 1.7–7.7)
NEUTROPHILS NFR BLD: 85.3 %
PERFORMED ON: ABNORMAL
PERFORMED ON: ABNORMAL
PERFORMED ON: NORMAL
PLATELET # BLD AUTO: 257 K/UL (ref 135–450)
PMV BLD AUTO: 8.1 FL (ref 5–10.5)
POTASSIUM SERPL-SCNC: 8 MMOL/L (ref 3.5–5.1)
PROT SERPL-MCNC: 6.9 G/DL (ref 6.4–8.2)
RBC # BLD AUTO: 3.51 M/UL (ref 4.2–5.9)
SODIUM SERPL-SCNC: 129 MMOL/L (ref 136–145)
WBC # BLD AUTO: 8 K/UL (ref 4–11)

## 2023-10-23 PROCEDURE — 80053 COMPREHEN METABOLIC PANEL: CPT

## 2023-10-23 PROCEDURE — 5A1D70Z PERFORMANCE OF URINARY FILTRATION, INTERMITTENT, LESS THAN 6 HOURS PER DAY: ICD-10-PCS | Performed by: INTERNAL MEDICINE

## 2023-10-23 PROCEDURE — 1200000000 HC SEMI PRIVATE

## 2023-10-23 PROCEDURE — 93005 ELECTROCARDIOGRAM TRACING: CPT | Performed by: PHYSICIAN ASSISTANT

## 2023-10-23 PROCEDURE — 90935 HEMODIALYSIS ONE EVALUATION: CPT

## 2023-10-23 PROCEDURE — 6370000000 HC RX 637 (ALT 250 FOR IP): Performed by: NURSE PRACTITIONER

## 2023-10-23 PROCEDURE — 85025 COMPLETE CBC W/AUTO DIFF WBC: CPT

## 2023-10-23 PROCEDURE — 73562 X-RAY EXAM OF KNEE 3: CPT

## 2023-10-23 PROCEDURE — 71045 X-RAY EXAM CHEST 1 VIEW: CPT

## 2023-10-23 PROCEDURE — 2700000000 HC OXYGEN THERAPY PER DAY

## 2023-10-23 PROCEDURE — 6370000000 HC RX 637 (ALT 250 FOR IP): Performed by: INTERNAL MEDICINE

## 2023-10-23 PROCEDURE — 2500000003 HC RX 250 WO HCPCS: Performed by: PHYSICIAN ASSISTANT

## 2023-10-23 PROCEDURE — 73610 X-RAY EXAM OF ANKLE: CPT

## 2023-10-23 PROCEDURE — 6370000000 HC RX 637 (ALT 250 FOR IP): Performed by: PHYSICIAN ASSISTANT

## 2023-10-23 PROCEDURE — 99285 EMERGENCY DEPT VISIT HI MDM: CPT

## 2023-10-23 PROCEDURE — 94761 N-INVAS EAR/PLS OXIMETRY MLT: CPT

## 2023-10-23 PROCEDURE — 2580000003 HC RX 258: Performed by: INTERNAL MEDICINE

## 2023-10-23 RX ORDER — IPRATROPIUM BROMIDE AND ALBUTEROL SULFATE 2.5; .5 MG/3ML; MG/3ML
1 SOLUTION RESPIRATORY (INHALATION) ONCE
Status: COMPLETED | OUTPATIENT
Start: 2023-10-23 | End: 2023-10-23

## 2023-10-23 RX ORDER — LIDOCAINE 4 G/G
1 PATCH TOPICAL DAILY
Status: DISCONTINUED | OUTPATIENT
Start: 2023-10-23 | End: 2023-10-26 | Stop reason: HOSPADM

## 2023-10-23 RX ORDER — CLOPIDOGREL BISULFATE 75 MG/1
75 TABLET ORAL DAILY
Status: DISCONTINUED | OUTPATIENT
Start: 2023-10-23 | End: 2023-10-26 | Stop reason: HOSPADM

## 2023-10-23 RX ORDER — AMOXICILLIN AND CLAVULANATE POTASSIUM 875; 125 MG/1; MG/1
1 TABLET, FILM COATED ORAL 2 TIMES DAILY
Qty: 10 TABLET | Refills: 0 | Status: SHIPPED | OUTPATIENT
Start: 2023-10-23 | End: 2023-10-26 | Stop reason: HOSPADM

## 2023-10-23 RX ORDER — SODIUM POLYSTYRENE SULFONATE 15 G/60ML
15 SUSPENSION ORAL; RECTAL ONCE
Status: COMPLETED | OUTPATIENT
Start: 2023-10-23 | End: 2023-10-23

## 2023-10-23 RX ORDER — TORSEMIDE 100 MG/1
100 TABLET ORAL DAILY
Status: DISCONTINUED | OUTPATIENT
Start: 2023-10-23 | End: 2023-10-26 | Stop reason: HOSPADM

## 2023-10-23 RX ORDER — OXYCODONE HYDROCHLORIDE AND ACETAMINOPHEN 5; 325 MG/1; MG/1
1 TABLET ORAL EVERY 4 HOURS PRN
Status: DISCONTINUED | OUTPATIENT
Start: 2023-10-23 | End: 2023-10-26 | Stop reason: HOSPADM

## 2023-10-23 RX ORDER — DEXTROSE MONOHYDRATE 25 G/50ML
25 INJECTION, SOLUTION INTRAVENOUS ONCE
Status: COMPLETED | OUTPATIENT
Start: 2023-10-23 | End: 2023-10-23

## 2023-10-23 RX ORDER — AZITHROMYCIN 250 MG/1
500 TABLET, FILM COATED ORAL DAILY
Qty: 8 TABLET | Refills: 0 | Status: SHIPPED | OUTPATIENT
Start: 2023-10-23 | End: 2023-10-26 | Stop reason: HOSPADM

## 2023-10-23 RX ORDER — INSULIN LISPRO 100 [IU]/ML
0-4 INJECTION, SOLUTION INTRAVENOUS; SUBCUTANEOUS NIGHTLY
Status: DISCONTINUED | OUTPATIENT
Start: 2023-10-23 | End: 2023-10-26 | Stop reason: HOSPADM

## 2023-10-23 RX ORDER — SODIUM CHLORIDE 0.9 % (FLUSH) 0.9 %
5-40 SYRINGE (ML) INJECTION PRN
Status: DISCONTINUED | OUTPATIENT
Start: 2023-10-23 | End: 2023-10-26 | Stop reason: HOSPADM

## 2023-10-23 RX ORDER — METOPROLOL SUCCINATE 50 MG/1
100 TABLET, EXTENDED RELEASE ORAL 2 TIMES DAILY
Status: DISCONTINUED | OUTPATIENT
Start: 2023-10-23 | End: 2023-10-26 | Stop reason: HOSPADM

## 2023-10-23 RX ORDER — DEXTROSE MONOHYDRATE 100 MG/ML
INJECTION, SOLUTION INTRAVENOUS CONTINUOUS PRN
Status: DISCONTINUED | OUTPATIENT
Start: 2023-10-23 | End: 2023-10-26 | Stop reason: HOSPADM

## 2023-10-23 RX ORDER — SODIUM CHLORIDE 0.9 % (FLUSH) 0.9 %
5-40 SYRINGE (ML) INJECTION EVERY 12 HOURS SCHEDULED
Status: DISCONTINUED | OUTPATIENT
Start: 2023-10-23 | End: 2023-10-26 | Stop reason: HOSPADM

## 2023-10-23 RX ORDER — ACETAMINOPHEN 650 MG/1
650 SUPPOSITORY RECTAL EVERY 6 HOURS PRN
Status: DISCONTINUED | OUTPATIENT
Start: 2023-10-23 | End: 2023-10-26 | Stop reason: HOSPADM

## 2023-10-23 RX ORDER — TRAZODONE HYDROCHLORIDE 50 MG/1
50 TABLET ORAL NIGHTLY
Status: DISCONTINUED | OUTPATIENT
Start: 2023-10-23 | End: 2023-10-26 | Stop reason: HOSPADM

## 2023-10-23 RX ORDER — PRAVASTATIN SODIUM 40 MG
40 TABLET ORAL DAILY
Status: DISCONTINUED | OUTPATIENT
Start: 2023-10-23 | End: 2023-10-26 | Stop reason: HOSPADM

## 2023-10-23 RX ORDER — ONDANSETRON 4 MG/1
4 TABLET, ORALLY DISINTEGRATING ORAL EVERY 8 HOURS PRN
Status: DISCONTINUED | OUTPATIENT
Start: 2023-10-23 | End: 2023-10-26 | Stop reason: HOSPADM

## 2023-10-23 RX ORDER — IPRATROPIUM BROMIDE AND ALBUTEROL SULFATE 2.5; .5 MG/3ML; MG/3ML
1 SOLUTION RESPIRATORY (INHALATION) EVERY 6 HOURS PRN
Status: DISCONTINUED | OUTPATIENT
Start: 2023-10-23 | End: 2023-10-26 | Stop reason: HOSPADM

## 2023-10-23 RX ORDER — OXYCODONE AND ACETAMINOPHEN 7.5; 325 MG/1; MG/1
1 TABLET ORAL ONCE
Status: COMPLETED | OUTPATIENT
Start: 2023-10-23 | End: 2023-10-23

## 2023-10-23 RX ORDER — INSULIN GLARGINE 100 [IU]/ML
10 INJECTION, SOLUTION SUBCUTANEOUS NIGHTLY
Status: DISCONTINUED | OUTPATIENT
Start: 2023-10-23 | End: 2023-10-26 | Stop reason: HOSPADM

## 2023-10-23 RX ORDER — PANTOPRAZOLE SODIUM 40 MG/1
40 TABLET, DELAYED RELEASE ORAL
Status: DISCONTINUED | OUTPATIENT
Start: 2023-10-24 | End: 2023-10-26 | Stop reason: HOSPADM

## 2023-10-23 RX ORDER — DULOXETIN HYDROCHLORIDE 60 MG/1
60 CAPSULE, DELAYED RELEASE ORAL DAILY
Status: DISCONTINUED | OUTPATIENT
Start: 2023-10-23 | End: 2023-10-26 | Stop reason: HOSPADM

## 2023-10-23 RX ORDER — ONDANSETRON 2 MG/ML
4 INJECTION INTRAMUSCULAR; INTRAVENOUS EVERY 6 HOURS PRN
Status: DISCONTINUED | OUTPATIENT
Start: 2023-10-23 | End: 2023-10-26 | Stop reason: HOSPADM

## 2023-10-23 RX ORDER — ISOSORBIDE DINITRATE 20 MG/1
20 TABLET ORAL 3 TIMES DAILY
Status: DISCONTINUED | OUTPATIENT
Start: 2023-10-23 | End: 2023-10-26 | Stop reason: HOSPADM

## 2023-10-23 RX ORDER — AMOXICILLIN AND CLAVULANATE POTASSIUM 875; 125 MG/1; MG/1
1 TABLET, FILM COATED ORAL ONCE
Status: COMPLETED | OUTPATIENT
Start: 2023-10-23 | End: 2023-10-23

## 2023-10-23 RX ORDER — SODIUM CHLORIDE 9 MG/ML
INJECTION, SOLUTION INTRAVENOUS PRN
Status: DISCONTINUED | OUTPATIENT
Start: 2023-10-23 | End: 2023-10-26 | Stop reason: HOSPADM

## 2023-10-23 RX ORDER — QUETIAPINE FUMARATE 25 MG/1
50 TABLET, FILM COATED ORAL NIGHTLY
Status: DISCONTINUED | OUTPATIENT
Start: 2023-10-23 | End: 2023-10-26 | Stop reason: HOSPADM

## 2023-10-23 RX ORDER — CALCIUM GLUCONATE 20 MG/ML
1000 INJECTION, SOLUTION INTRAVENOUS ONCE
Status: COMPLETED | OUTPATIENT
Start: 2023-10-23 | End: 2023-10-23

## 2023-10-23 RX ORDER — INSULIN LISPRO 100 [IU]/ML
0-4 INJECTION, SOLUTION INTRAVENOUS; SUBCUTANEOUS
Status: DISCONTINUED | OUTPATIENT
Start: 2023-10-23 | End: 2023-10-26 | Stop reason: HOSPADM

## 2023-10-23 RX ORDER — AZITHROMYCIN 250 MG/1
500 TABLET, FILM COATED ORAL ONCE
Status: COMPLETED | OUTPATIENT
Start: 2023-10-23 | End: 2023-10-23

## 2023-10-23 RX ORDER — POLYETHYLENE GLYCOL 3350 17 G/17G
17 POWDER, FOR SOLUTION ORAL DAILY PRN
Status: DISCONTINUED | OUTPATIENT
Start: 2023-10-23 | End: 2023-10-26 | Stop reason: HOSPADM

## 2023-10-23 RX ORDER — ACETAMINOPHEN 325 MG/1
650 TABLET ORAL EVERY 6 HOURS PRN
Status: DISCONTINUED | OUTPATIENT
Start: 2023-10-23 | End: 2023-10-26 | Stop reason: HOSPADM

## 2023-10-23 RX ADMIN — INSULIN HUMAN 10 UNITS: 100 INJECTION, SOLUTION PARENTERAL at 14:57

## 2023-10-23 RX ADMIN — PRAVASTATIN SODIUM 40 MG: 40 TABLET ORAL at 22:23

## 2023-10-23 RX ADMIN — TRAZODONE HYDROCHLORIDE 50 MG: 50 TABLET ORAL at 21:48

## 2023-10-23 RX ADMIN — AZITHROMYCIN 500 MG: 250 TABLET, FILM COATED ORAL at 12:15

## 2023-10-23 RX ADMIN — CALCIUM GLUCONATE 1000 MG: 20 INJECTION, SOLUTION INTRAVENOUS at 15:01

## 2023-10-23 RX ADMIN — IPRATROPIUM BROMIDE AND ALBUTEROL SULFATE 1 DOSE: 2.5; .5 SOLUTION RESPIRATORY (INHALATION) at 13:11

## 2023-10-23 RX ADMIN — APIXABAN 5 MG: 5 TABLET, FILM COATED ORAL at 21:48

## 2023-10-23 RX ADMIN — INSULIN GLARGINE 10 UNITS: 100 INJECTION, SOLUTION SUBCUTANEOUS at 22:11

## 2023-10-23 RX ADMIN — AMOXICILLIN AND CLAVULANATE POTASSIUM 1 TABLET: 875; 125 TABLET, FILM COATED ORAL at 12:15

## 2023-10-23 RX ADMIN — SODIUM BICARBONATE 50 MEQ: 84 INJECTION, SOLUTION INTRAVENOUS at 14:57

## 2023-10-23 RX ADMIN — SODIUM POLYSTYRENE SULFONATE 15 G: 15 SUSPENSION ORAL; RECTAL at 14:56

## 2023-10-23 RX ADMIN — OXYCODONE AND ACETAMINOPHEN 1 TABLET: 5; 325 TABLET ORAL at 16:32

## 2023-10-23 RX ADMIN — OXYCODONE AND ACETAMINOPHEN 1 TABLET: 5; 325 TABLET ORAL at 20:25

## 2023-10-23 RX ADMIN — DULOXETINE HYDROCHLORIDE 60 MG: 60 CAPSULE, DELAYED RELEASE ORAL at 22:01

## 2023-10-23 RX ADMIN — OXYCODONE AND ACETAMINOPHEN 1 TABLET: 7.5; 325 TABLET ORAL at 11:12

## 2023-10-23 RX ADMIN — TORSEMIDE 100 MG: 100 TABLET ORAL at 22:46

## 2023-10-23 RX ADMIN — DEXTROSE MONOHYDRATE 25 G: 25 INJECTION, SOLUTION INTRAVENOUS at 14:56

## 2023-10-23 RX ADMIN — QUETIAPINE FUMARATE 50 MG: 25 TABLET ORAL at 21:48

## 2023-10-23 RX ADMIN — CLOPIDOGREL BISULFATE 75 MG: 75 TABLET ORAL at 22:01

## 2023-10-23 RX ADMIN — SODIUM CHLORIDE, PRESERVATIVE FREE 10 ML: 5 INJECTION INTRAVENOUS at 22:49

## 2023-10-23 RX ADMIN — OXYCODONE AND ACETAMINOPHEN 1 TABLET: 7.5; 325 TABLET ORAL at 22:23

## 2023-10-23 ASSESSMENT — PAIN SCALES - GENERAL
PAINLEVEL_OUTOF10: 6
PAINLEVEL_OUTOF10: 8
PAINLEVEL_OUTOF10: 10
PAINLEVEL_OUTOF10: 8
PAINLEVEL_OUTOF10: 8
PAINLEVEL_OUTOF10: 10
PAINLEVEL_OUTOF10: 9
PAINLEVEL_OUTOF10: 6

## 2023-10-23 ASSESSMENT — PAIN DESCRIPTION - LOCATION
LOCATION: ANKLE
LOCATION: FOOT
LOCATION: ANKLE

## 2023-10-23 ASSESSMENT — PAIN - FUNCTIONAL ASSESSMENT
PAIN_FUNCTIONAL_ASSESSMENT: 0-10
PAIN_FUNCTIONAL_ASSESSMENT: 0-10
PAIN_FUNCTIONAL_ASSESSMENT: PREVENTS OR INTERFERES SOME ACTIVE ACTIVITIES AND ADLS
PAIN_FUNCTIONAL_ASSESSMENT: PREVENTS OR INTERFERES SOME ACTIVE ACTIVITIES AND ADLS
PAIN_FUNCTIONAL_ASSESSMENT: 0-10
PAIN_FUNCTIONAL_ASSESSMENT: ACTIVITIES ARE NOT PREVENTED

## 2023-10-23 ASSESSMENT — PAIN DESCRIPTION - ORIENTATION
ORIENTATION: RIGHT

## 2023-10-23 ASSESSMENT — PAIN DESCRIPTION - FREQUENCY: FREQUENCY: CONTINUOUS

## 2023-10-23 ASSESSMENT — PAIN DESCRIPTION - DESCRIPTORS
DESCRIPTORS: ACHING
DESCRIPTORS: ACHING;STABBING
DESCRIPTORS: STABBING
DESCRIPTORS: STABBING
DESCRIPTORS: ACHING

## 2023-10-23 ASSESSMENT — PAIN DESCRIPTION - ONSET: ONSET: ON-GOING

## 2023-10-23 ASSESSMENT — PAIN DESCRIPTION - PAIN TYPE: TYPE: ACUTE PAIN

## 2023-10-23 NOTE — ED NOTES
@2501 Nephrology called back and spoke with Daniel Brightly, PA  RE: Missed dialysis     [place] [facility]  10/23/23 0216

## 2023-10-23 NOTE — ED PROVIDER NOTES
[address] [place] [address]  ED  EMERGENCY DEPARTMENT ENCOUNTER        Patient Name: Julienne Quispe  MRN: [id number]  [address] [facility] 1970  Date of evaluation: 10/23/2023  Provider: Oj Isaacs MD  PCP: JAY Arrieta - CNP  Note Started: 2:12 PM EDT 10/23/23    I independently examined and evaluated Julienne Quispe. I personally saw the patient and performed a substantive portion of the visit including all aspects of the medical decision making. I am the primary physician of record. CHIEF COMPLAINT  Fall        HISTORY OF PRESENT ILLNESS  History from : Patient    Limitations to history : None    In brief, Julienne Quispe is a 52 y.o. male  has a past medical history of Ambulatory dysfunction, Aortic stenosis, Arthritis, Asthma, 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/2013), CAD (coronary artery disease), Cardiomyopathy ([address]) (04/19/2019), CHF (congestive heart failure) ([address]), Chronic pain, COPD (chronic obstructive pulmonary disease) ([address]), Depression, Diabetes mellitus ([address]), Difficult intravenous access, Emphysema of lung ([address]), ESRD (end stage renal disease) on dialysis ([address]), Fear of needles, Gastric ulcer, GERD (gastroesophageal reflux disease), Heart valve problem, Hemodialysis patient ([address]), History of spinal fracture, blood clots, Hyperlipidemia, Hypertension, MI (myocardial infarction) ([address]) (2019), Neuromuscular disorder ([address]), Numbness and tingling in left arm, Pneumonia, PONV (postoperative nausea and vomiting), Prolonged emergence from general anesthesia, Sleep apnea, Stroke ([address]), TIA (transient ischemic attack), and Unspecified diseases of blood and blood-forming organs. , who presents to the ED complaining of ankle injury. Today he twisted his ankle. His last dialysis was Wednesday (has missed 2 rounds)  He reports feeling slightly fluid overloaded. No chest pain. No fevers or chills. No nausea or vomiting. No belly pain.   He reports pain in his ankle, worse when he
Refills: 0      azithromycin (ZITHROMAX) 250 MG tablet Take 2 tablets by mouth daily for 4 days  Qty: 8 tablet, Refills: 0             JASE Miller (electronically signed)        Nakul Altman[address]  10/23/23 0005

## 2023-10-23 NOTE — CONSULTS
[facility])   Nephrology Note           Reason for Consult:  hyperkalemia, ESRD  Requesting Physician:  dr Maged Rubi:  weakness        HISTORY OF PRESENT ILLNESS:    52 y.o. man with ESRD on maintenance HD at [facility] , missed HD Friday and presented to the ER today after fall at home; he states his legs just \"gave up\" and he fall, with subsequent pain in R knee and ankle. He has been having difficulties with transportation and he misses HD often due to lack of transportation  He is also poorly compliant with his diet and fluid restrictions.   In the ER , he was found to have life threatening hyperkalemia, and we were consulted STAT      Past Medical History:       Diagnosis Date    Ambulatory dysfunction       walker for long distances, SOB with distance    Aortic stenosis       echo 2017    Arthritis       hands and hips    Asthma      Bilateral hilar adenopathy syndrome 6/3/2013    CAD (coronary artery disease)       [facility]. [facility]) 04/19/2019     EF= 43%    CHF (congestive heart failure) (HCC)      Chronic pain      COPD (chronic obstructive pulmonary disease) ([address])       pulmonology Dr. Renzo Fuentes    Depression      Diabetes mellitus ([address])       borderline    Difficult intravenous access      Emphysema of lung ([address])      ESRD (end stage renal disease) on dialysis ([address])       MWF    Fear of needles      Gastric ulcer      GERD (gastroesophageal reflux disease)      Heart valve problem       bicuspic valve    Hemodialysis patient ([address])      History of spinal fracture       work incident    Hx of blood clots       Bilateral lower extremities; stents in place    Hyperlipidemia      Hypertension      MI (myocardial infarction) ([address]) 2019     has had 9 MIs. 2019 was the last    Neuromuscular disorder ([address])       due to CVA    Numbness and tingling in left arm       from fistula    Pneumonia      PONV (postoperative nausea and

## 2023-10-23 NOTE — H&P
10/23/23  1300   WBC 8.0   HGB 10.2*   HCT 31.3*        Recent Labs     10/23/23  1300   *   K 8.0*   CL 86*   CO2 22   *   CREATININE 11.3*   CALCIUM 8.8     No results for input(s): \"PROBNP\", \"TROPHS\" in the last 72 hours. No results for input(s): \"LABA1C\" in the last 72 hours. Recent Labs     10/23/23  1300   AST 8*   ALT 8*   BILITOT 0.3   ALKPHOS 94     No results for input(s): \"INR\", \"LACTA\", \"TSH\" in the last 72 hours.      Joane Lesches, MD

## 2023-10-23 NOTE — DISCHARGE INSTRUCTIONS
-Go to dialysis 10/27.  -Follow up with your primary doctor this week. -Come back if you feel worse.

## 2023-10-24 LAB
ANION GAP SERPL CALCULATED.3IONS-SCNC: 17 MMOL/L (ref 3–16)
BASOPHILS # BLD: 0.1 K/UL (ref 0–0.2)
BASOPHILS NFR BLD: 1 %
BUN SERPL-MCNC: 55 MG/DL (ref 7–20)
CALCIUM SERPL-MCNC: 8.7 MG/DL (ref 8.3–10.6)
CHLORIDE SERPL-SCNC: 87 MMOL/L (ref 99–110)
CO2 SERPL-SCNC: 25 MMOL/L (ref 21–32)
CREAT SERPL-MCNC: 7.1 MG/DL (ref 0.9–1.3)
DEPRECATED RDW RBC AUTO: 17 % (ref 12.4–15.4)
EKG ATRIAL RATE: 67 BPM
EKG DIAGNOSIS: NORMAL
EKG P-R INTERVAL: 192 MS
EKG Q-T INTERVAL: 424 MS
EKG QRS DURATION: 118 MS
EKG QTC CALCULATION (BAZETT): 448 MS
EKG R AXIS: -5 DEGREES
EKG T AXIS: 26 DEGREES
EKG VENTRICULAR RATE: 67 BPM
EOSINOPHIL # BLD: 0.3 K/UL (ref 0–0.6)
EOSINOPHIL NFR BLD: 4.9 %
GFR SERPLBLD CREATININE-BSD FMLA CKD-EPI: 8 ML/MIN/{1.73_M2}
GLUCOSE BLD-MCNC: 105 MG/DL (ref 70–99)
GLUCOSE BLD-MCNC: 127 MG/DL (ref 70–99)
GLUCOSE BLD-MCNC: 129 MG/DL (ref 70–99)
GLUCOSE BLD-MCNC: 136 MG/DL (ref 70–99)
GLUCOSE SERPL-MCNC: 112 MG/DL (ref 70–99)
HCT VFR BLD AUTO: 31.4 % (ref 40.5–52.5)
HGB BLD-MCNC: 10.3 G/DL (ref 13.5–17.5)
LYMPHOCYTES # BLD: 0.6 K/UL (ref 1–5.1)
LYMPHOCYTES NFR BLD: 9 %
MCH RBC QN AUTO: 29.1 PG (ref 26–34)
MCHC RBC AUTO-ENTMCNC: 32.7 G/DL (ref 31–36)
MCV RBC AUTO: 89.1 FL (ref 80–100)
MONOCYTES # BLD: 0.6 K/UL (ref 0–1.3)
MONOCYTES NFR BLD: 9.7 %
NEUTROPHILS # BLD: 4.8 K/UL (ref 1.7–7.7)
NEUTROPHILS NFR BLD: 75.4 %
PERFORMED ON: ABNORMAL
PLATELET # BLD AUTO: 225 K/UL (ref 135–450)
PMV BLD AUTO: 7.9 FL (ref 5–10.5)
POTASSIUM SERPL-SCNC: 4.7 MMOL/L (ref 3.5–5.1)
RBC # BLD AUTO: 3.52 M/UL (ref 4.2–5.9)
SODIUM SERPL-SCNC: 129 MMOL/L (ref 136–145)
WBC # BLD AUTO: 6.4 K/UL (ref 4–11)

## 2023-10-24 PROCEDURE — 6360000002 HC RX W HCPCS: Performed by: INTERNAL MEDICINE

## 2023-10-24 PROCEDURE — 94761 N-INVAS EAR/PLS OXIMETRY MLT: CPT

## 2023-10-24 PROCEDURE — 97162 PT EVAL MOD COMPLEX 30 MIN: CPT

## 2023-10-24 PROCEDURE — 36415 COLL VENOUS BLD VENIPUNCTURE: CPT

## 2023-10-24 PROCEDURE — 1200000000 HC SEMI PRIVATE

## 2023-10-24 PROCEDURE — 6370000000 HC RX 637 (ALT 250 FOR IP): Performed by: NURSE PRACTITIONER

## 2023-10-24 PROCEDURE — 6370000000 HC RX 637 (ALT 250 FOR IP): Performed by: INTERNAL MEDICINE

## 2023-10-24 PROCEDURE — 2580000003 HC RX 258: Performed by: INTERNAL MEDICINE

## 2023-10-24 PROCEDURE — 80048 BASIC METABOLIC PNL TOTAL CA: CPT

## 2023-10-24 PROCEDURE — 97167 OT EVAL HIGH COMPLEX 60 MIN: CPT

## 2023-10-24 PROCEDURE — 97535 SELF CARE MNGMENT TRAINING: CPT

## 2023-10-24 PROCEDURE — 97530 THERAPEUTIC ACTIVITIES: CPT

## 2023-10-24 PROCEDURE — 2700000000 HC OXYGEN THERAPY PER DAY

## 2023-10-24 PROCEDURE — 85025 COMPLETE CBC W/AUTO DIFF WBC: CPT

## 2023-10-24 PROCEDURE — 93010 ELECTROCARDIOGRAM REPORT: CPT | Performed by: INTERNAL MEDICINE

## 2023-10-24 RX ORDER — PROCHLORPERAZINE EDISYLATE 5 MG/ML
10 INJECTION INTRAMUSCULAR; INTRAVENOUS EVERY 6 HOURS PRN
Status: DISCONTINUED | OUTPATIENT
Start: 2023-10-24 | End: 2023-10-26 | Stop reason: HOSPADM

## 2023-10-24 RX ADMIN — OXYCODONE AND ACETAMINOPHEN 1 TABLET: 5; 325 TABLET ORAL at 14:05

## 2023-10-24 RX ADMIN — SODIUM CHLORIDE, PRESERVATIVE FREE 10 ML: 5 INJECTION INTRAVENOUS at 20:19

## 2023-10-24 RX ADMIN — OXYCODONE AND ACETAMINOPHEN 1 TABLET: 5; 325 TABLET ORAL at 22:31

## 2023-10-24 RX ADMIN — TRAZODONE HYDROCHLORIDE 50 MG: 50 TABLET ORAL at 20:18

## 2023-10-24 RX ADMIN — INSULIN GLARGINE 10 UNITS: 100 INJECTION, SOLUTION SUBCUTANEOUS at 20:18

## 2023-10-24 RX ADMIN — PANTOPRAZOLE SODIUM 40 MG: 40 TABLET, DELAYED RELEASE ORAL at 05:40

## 2023-10-24 RX ADMIN — APIXABAN 5 MG: 5 TABLET, FILM COATED ORAL at 09:24

## 2023-10-24 RX ADMIN — CLOPIDOGREL BISULFATE 75 MG: 75 TABLET ORAL at 09:24

## 2023-10-24 RX ADMIN — DULOXETINE HYDROCHLORIDE 60 MG: 60 CAPSULE, DELAYED RELEASE ORAL at 09:24

## 2023-10-24 RX ADMIN — METOPROLOL SUCCINATE 100 MG: 50 TABLET, EXTENDED RELEASE ORAL at 20:18

## 2023-10-24 RX ADMIN — ISOSORBIDE DINITRATE 20 MG: 20 TABLET ORAL at 20:18

## 2023-10-24 RX ADMIN — OXYCODONE AND ACETAMINOPHEN 1 TABLET: 5; 325 TABLET ORAL at 05:41

## 2023-10-24 RX ADMIN — OXYCODONE AND ACETAMINOPHEN 1 TABLET: 5; 325 TABLET ORAL at 09:24

## 2023-10-24 RX ADMIN — ISOSORBIDE DINITRATE 20 MG: 20 TABLET ORAL at 09:25

## 2023-10-24 RX ADMIN — QUETIAPINE FUMARATE 50 MG: 25 TABLET ORAL at 20:18

## 2023-10-24 RX ADMIN — SODIUM CHLORIDE, PRESERVATIVE FREE 10 ML: 5 INJECTION INTRAVENOUS at 09:25

## 2023-10-24 RX ADMIN — OXYCODONE AND ACETAMINOPHEN 1 TABLET: 5; 325 TABLET ORAL at 01:28

## 2023-10-24 RX ADMIN — ONDANSETRON 4 MG: 2 INJECTION INTRAMUSCULAR; INTRAVENOUS at 14:53

## 2023-10-24 RX ADMIN — TORSEMIDE 100 MG: 100 TABLET ORAL at 09:24

## 2023-10-24 RX ADMIN — APIXABAN 5 MG: 5 TABLET, FILM COATED ORAL at 20:18

## 2023-10-24 RX ADMIN — ISOSORBIDE DINITRATE 20 MG: 20 TABLET ORAL at 14:05

## 2023-10-24 RX ADMIN — METOPROLOL SUCCINATE 100 MG: 50 TABLET, EXTENDED RELEASE ORAL at 09:25

## 2023-10-24 RX ADMIN — PRAVASTATIN SODIUM 40 MG: 40 TABLET ORAL at 09:24

## 2023-10-24 ASSESSMENT — PAIN SCALES - GENERAL
PAINLEVEL_OUTOF10: 10
PAINLEVEL_OUTOF10: 5
PAINLEVEL_OUTOF10: 0
PAINLEVEL_OUTOF10: 5
PAINLEVEL_OUTOF10: 10
PAINLEVEL_OUTOF10: 9
PAINLEVEL_OUTOF10: 5
PAINLEVEL_OUTOF10: 10
PAINLEVEL_OUTOF10: 10
PAINLEVEL_OUTOF10: 8

## 2023-10-24 ASSESSMENT — PAIN DESCRIPTION - ORIENTATION
ORIENTATION: RIGHT

## 2023-10-24 ASSESSMENT — PAIN DESCRIPTION - DESCRIPTORS
DESCRIPTORS: STABBING
DESCRIPTORS: ACHING;SHARP
DESCRIPTORS: ACHING;SHARP
DESCRIPTORS: STABBING
DESCRIPTORS: ACHING
DESCRIPTORS: DISCOMFORT

## 2023-10-24 ASSESSMENT — PAIN DESCRIPTION - PAIN TYPE
TYPE: ACUTE PAIN
TYPE: ACUTE PAIN

## 2023-10-24 ASSESSMENT — PAIN DESCRIPTION - LOCATION
LOCATION: KNEE;FOOT;ANKLE
LOCATION: ANKLE
LOCATION: ANKLE
LOCATION: ANKLE;FOOT;KNEE
LOCATION: ANKLE
LOCATION: ANKLE

## 2023-10-24 ASSESSMENT — PAIN DESCRIPTION - ONSET: ONSET: ON-GOING

## 2023-10-24 ASSESSMENT — PAIN - FUNCTIONAL ASSESSMENT
PAIN_FUNCTIONAL_ASSESSMENT: ACTIVITIES ARE NOT PREVENTED
PAIN_FUNCTIONAL_ASSESSMENT: PREVENTS OR INTERFERES SOME ACTIVE ACTIVITIES AND ADLS
PAIN_FUNCTIONAL_ASSESSMENT: PREVENTS OR INTERFERES SOME ACTIVE ACTIVITIES AND ADLS

## 2023-10-24 ASSESSMENT — PAIN DESCRIPTION - FREQUENCY: FREQUENCY: CONTINUOUS

## 2023-10-25 LAB
ALBUMIN SERPL-MCNC: 3.1 G/DL (ref 3.4–5)
ANION GAP SERPL CALCULATED.3IONS-SCNC: 17 MMOL/L (ref 3–16)
BUN SERPL-MCNC: 64 MG/DL (ref 7–20)
CALCIUM SERPL-MCNC: 9.2 MG/DL (ref 8.3–10.6)
CHLORIDE SERPL-SCNC: 88 MMOL/L (ref 99–110)
CO2 SERPL-SCNC: 24 MMOL/L (ref 21–32)
CREAT SERPL-MCNC: 8.3 MG/DL (ref 0.9–1.3)
DEPRECATED RDW RBC AUTO: 16.5 % (ref 12.4–15.4)
GFR SERPLBLD CREATININE-BSD FMLA CKD-EPI: 7 ML/MIN/{1.73_M2}
GLUCOSE BLD-MCNC: 154 MG/DL (ref 70–99)
GLUCOSE BLD-MCNC: 161 MG/DL (ref 70–99)
GLUCOSE BLD-MCNC: 208 MG/DL (ref 70–99)
GLUCOSE BLD-MCNC: 79 MG/DL (ref 70–99)
GLUCOSE SERPL-MCNC: 75 MG/DL (ref 70–99)
HCT VFR BLD AUTO: 27.8 % (ref 40.5–52.5)
HGB BLD-MCNC: 8.9 G/DL (ref 13.5–17.5)
MCH RBC QN AUTO: 28.7 PG (ref 26–34)
MCHC RBC AUTO-ENTMCNC: 32 G/DL (ref 31–36)
MCV RBC AUTO: 89.6 FL (ref 80–100)
PERFORMED ON: ABNORMAL
PERFORMED ON: NORMAL
PHOSPHATE SERPL-MCNC: 10.8 MG/DL (ref 2.5–4.9)
PLATELET # BLD AUTO: 221 K/UL (ref 135–450)
PMV BLD AUTO: 8.1 FL (ref 5–10.5)
POTASSIUM SERPL-SCNC: 5.6 MMOL/L (ref 3.5–5.1)
RBC # BLD AUTO: 3.1 M/UL (ref 4.2–5.9)
SODIUM SERPL-SCNC: 129 MMOL/L (ref 136–145)
WBC # BLD AUTO: 6.4 K/UL (ref 4–11)

## 2023-10-25 PROCEDURE — 2580000003 HC RX 258: Performed by: INTERNAL MEDICINE

## 2023-10-25 PROCEDURE — 6370000000 HC RX 637 (ALT 250 FOR IP): Performed by: INTERNAL MEDICINE

## 2023-10-25 PROCEDURE — 94640 AIRWAY INHALATION TREATMENT: CPT

## 2023-10-25 PROCEDURE — 2700000000 HC OXYGEN THERAPY PER DAY

## 2023-10-25 PROCEDURE — 80069 RENAL FUNCTION PANEL: CPT

## 2023-10-25 PROCEDURE — 94761 N-INVAS EAR/PLS OXIMETRY MLT: CPT

## 2023-10-25 PROCEDURE — 1200000000 HC SEMI PRIVATE

## 2023-10-25 PROCEDURE — 85027 COMPLETE CBC AUTOMATED: CPT

## 2023-10-25 PROCEDURE — 6360000002 HC RX W HCPCS

## 2023-10-25 PROCEDURE — 90935 HEMODIALYSIS ONE EVALUATION: CPT

## 2023-10-25 RX ADMIN — ISOSORBIDE DINITRATE 20 MG: 20 TABLET ORAL at 21:23

## 2023-10-25 RX ADMIN — OXYCODONE AND ACETAMINOPHEN 1 TABLET: 5; 325 TABLET ORAL at 21:23

## 2023-10-25 RX ADMIN — QUETIAPINE FUMARATE 50 MG: 25 TABLET ORAL at 21:23

## 2023-10-25 RX ADMIN — METOPROLOL SUCCINATE 100 MG: 50 TABLET, EXTENDED RELEASE ORAL at 10:50

## 2023-10-25 RX ADMIN — SODIUM CHLORIDE, PRESERVATIVE FREE 10 ML: 5 INJECTION INTRAVENOUS at 21:25

## 2023-10-25 RX ADMIN — IPRATROPIUM BROMIDE AND ALBUTEROL SULFATE 1 DOSE: 2.5; .5 SOLUTION RESPIRATORY (INHALATION) at 23:55

## 2023-10-25 RX ADMIN — METOPROLOL SUCCINATE 100 MG: 50 TABLET, EXTENDED RELEASE ORAL at 21:23

## 2023-10-25 RX ADMIN — OXYCODONE AND ACETAMINOPHEN 1 TABLET: 5; 325 TABLET ORAL at 10:10

## 2023-10-25 RX ADMIN — APIXABAN 5 MG: 5 TABLET, FILM COATED ORAL at 21:23

## 2023-10-25 RX ADMIN — TORSEMIDE 100 MG: 100 TABLET ORAL at 10:10

## 2023-10-25 RX ADMIN — TRAZODONE HYDROCHLORIDE 50 MG: 50 TABLET ORAL at 21:23

## 2023-10-25 RX ADMIN — SODIUM CHLORIDE, PRESERVATIVE FREE 10 ML: 5 INJECTION INTRAVENOUS at 11:12

## 2023-10-25 RX ADMIN — CLOPIDOGREL BISULFATE 75 MG: 75 TABLET ORAL at 10:10

## 2023-10-25 RX ADMIN — PANTOPRAZOLE SODIUM 40 MG: 40 TABLET, DELAYED RELEASE ORAL at 05:43

## 2023-10-25 RX ADMIN — EPOETIN ALFA-EPBX 10000 UNITS: 10000 INJECTION, SOLUTION INTRAVENOUS; SUBCUTANEOUS at 15:36

## 2023-10-25 RX ADMIN — PRAVASTATIN SODIUM 40 MG: 40 TABLET ORAL at 10:10

## 2023-10-25 RX ADMIN — DULOXETINE HYDROCHLORIDE 60 MG: 60 CAPSULE, DELAYED RELEASE ORAL at 10:09

## 2023-10-25 RX ADMIN — OXYCODONE AND ACETAMINOPHEN 1 TABLET: 5; 325 TABLET ORAL at 05:43

## 2023-10-25 RX ADMIN — OXYCODONE AND ACETAMINOPHEN 1 TABLET: 5; 325 TABLET ORAL at 01:55

## 2023-10-25 RX ADMIN — APIXABAN 5 MG: 5 TABLET, FILM COATED ORAL at 10:10

## 2023-10-25 ASSESSMENT — PAIN DESCRIPTION - LOCATION
LOCATION: BACK;LEG
LOCATION: ANKLE
LOCATION: BACK;LEG
LOCATION: ANKLE
LOCATION: BACK;LEG

## 2023-10-25 ASSESSMENT — PAIN SCALES - GENERAL
PAINLEVEL_OUTOF10: 7
PAINLEVEL_OUTOF10: 9
PAINLEVEL_OUTOF10: 5
PAINLEVEL_OUTOF10: 6

## 2023-10-25 ASSESSMENT — PAIN DESCRIPTION - ORIENTATION
ORIENTATION: RIGHT
ORIENTATION: RIGHT

## 2023-10-26 VITALS
HEIGHT: 69 IN | SYSTOLIC BLOOD PRESSURE: 135 MMHG | TEMPERATURE: 97.7 F | HEART RATE: 62 BPM | BODY MASS INDEX: 36.08 KG/M2 | OXYGEN SATURATION: 97 % | RESPIRATION RATE: 16 BRPM | DIASTOLIC BLOOD PRESSURE: 53 MMHG | WEIGHT: 243.61 LBS

## 2023-10-26 LAB
GLUCOSE BLD-MCNC: 152 MG/DL (ref 70–99)
GLUCOSE BLD-MCNC: 183 MG/DL (ref 70–99)
PERFORMED ON: ABNORMAL
PERFORMED ON: ABNORMAL

## 2023-10-26 PROCEDURE — 2580000003 HC RX 258: Performed by: INTERNAL MEDICINE

## 2023-10-26 PROCEDURE — 97535 SELF CARE MNGMENT TRAINING: CPT

## 2023-10-26 PROCEDURE — 97110 THERAPEUTIC EXERCISES: CPT

## 2023-10-26 PROCEDURE — 97530 THERAPEUTIC ACTIVITIES: CPT

## 2023-10-26 PROCEDURE — 6370000000 HC RX 637 (ALT 250 FOR IP): Performed by: NURSE PRACTITIONER

## 2023-10-26 PROCEDURE — 6370000000 HC RX 637 (ALT 250 FOR IP): Performed by: INTERNAL MEDICINE

## 2023-10-26 RX ORDER — OXYCODONE HYDROCHLORIDE AND ACETAMINOPHEN 5; 325 MG/1; MG/1
1 TABLET ORAL EVERY 6 HOURS PRN
Qty: 12 TABLET | Refills: 0 | Status: SHIPPED | OUTPATIENT
Start: 2023-10-26 | End: 2023-10-29

## 2023-10-26 RX ADMIN — ISOSORBIDE DINITRATE 20 MG: 20 TABLET ORAL at 09:42

## 2023-10-26 RX ADMIN — DULOXETINE HYDROCHLORIDE 60 MG: 60 CAPSULE, DELAYED RELEASE ORAL at 09:42

## 2023-10-26 RX ADMIN — METOPROLOL SUCCINATE 100 MG: 50 TABLET, EXTENDED RELEASE ORAL at 09:43

## 2023-10-26 RX ADMIN — PANTOPRAZOLE SODIUM 40 MG: 40 TABLET, DELAYED RELEASE ORAL at 05:27

## 2023-10-26 RX ADMIN — PRAVASTATIN SODIUM 40 MG: 40 TABLET ORAL at 09:43

## 2023-10-26 RX ADMIN — OXYCODONE AND ACETAMINOPHEN 1 TABLET: 5; 325 TABLET ORAL at 14:45

## 2023-10-26 RX ADMIN — ISOSORBIDE DINITRATE 20 MG: 20 TABLET ORAL at 14:45

## 2023-10-26 RX ADMIN — OXYCODONE AND ACETAMINOPHEN 1 TABLET: 5; 325 TABLET ORAL at 09:43

## 2023-10-26 RX ADMIN — OXYCODONE AND ACETAMINOPHEN 1 TABLET: 5; 325 TABLET ORAL at 05:27

## 2023-10-26 RX ADMIN — CLOPIDOGREL BISULFATE 75 MG: 75 TABLET ORAL at 09:42

## 2023-10-26 RX ADMIN — TORSEMIDE 100 MG: 100 TABLET ORAL at 09:43

## 2023-10-26 RX ADMIN — SODIUM CHLORIDE, PRESERVATIVE FREE 10 ML: 5 INJECTION INTRAVENOUS at 09:44

## 2023-10-26 RX ADMIN — APIXABAN 5 MG: 5 TABLET, FILM COATED ORAL at 09:43

## 2023-10-26 ASSESSMENT — PAIN DESCRIPTION - ORIENTATION
ORIENTATION: RIGHT
ORIENTATION: RIGHT

## 2023-10-26 ASSESSMENT — PAIN SCALES - GENERAL
PAINLEVEL_OUTOF10: 8
PAINLEVEL_OUTOF10: 9
PAINLEVEL_OUTOF10: 7
PAINLEVEL_OUTOF10: 7

## 2023-10-26 ASSESSMENT — PAIN DESCRIPTION - LOCATION
LOCATION: LEG
LOCATION: BACK;LEG

## 2023-10-26 NOTE — CARE COORDINATION
CASE MANAGEMENT DISCHARGE SUMMARY      Discharge to: Home with [facility] for PT/OT NSG.  They are aware and can pull orders from [facility]    IMM given: 10/23/2023      Transportation:    Family/car: Private       Confirmed discharge plan with:     Patient: yes     Family:  yes, pt called wife     RN, name: Sai Gallegos RN
Chart reviewed day 2. Care provided by nephro and IM. Pt is from home with his wife. He has Nicko Mins dialysis at [facility]. He also has home O2 at 4 L with [facility]. Pt is active with [facility]. Will continue to monitor course for needs.  Trisha Tran RN
plan with any other family members/significant others, and if so, who? Plans to Return to Present Housing:    Other Identified Issues/Barriers to RETURNING to current housing:   Potential Assistance needed at discharge: N/A            Potential DME:    Patient expects to discharge to: [address] [facility] for transportation at discharge:      Financial    Payor: [facility] / Plan: [email or web address] / Product Type: *No Product type* /     Does insurance require precert for SNF: Yes    Potential assistance Purchasing Medications: No  Meds-to-Beds request: Yes      Loyce Hem [address] [facility], [address] [phone] - F [phone]  [address] [address] [address]  Phone: [phone] Fax: [phone]    [address] [facility], [address] [phone]  [address] [facility] [address]  Phone: [phone] Fax: [phone] [facility], [facility] [address] [facility],[address]  [address] [place] [address]  Phone: [phone] Fax: [phone] [address] [place] [address] [phone] Verline Fall [phone]  One [address] [place] [address]  Phone: [phone] Fax: [phone]      Notes:    Factors facilitating achievement of predicted outcomes: Family support and Pleasant    Barriers to discharge: none    Additional Case Management Notes: Pt lives in a mobile home with his wife. He uses a rollator to get around. He has home O2 with baseline of 4 L and a home BiPap with [employer]. Pt is active with [facility] and gets HD M-W-F with [facility]. Will continue to monitor course for needs.  Shereen Taylor RN     The Plan for Transition of Care is related to the following treatment goals of Hyperkalemia [E87.5]  ESRD (end stage renal disease) on dialysis ([address]) [N18.6, Z99.2]  Injury of right ankle, initial

## 2023-10-26 NOTE — DISCHARGE INSTR - COC
Good    Recommended Labs or Other Treatments After Discharge: PT/OT, skilled nursing    Physician Certification: I certify the above information and transfer of Ya Persaud  is necessary for the continuing treatment of the diagnosis listed and that he requires Home Care for less 30 days.      Update Admission H&P: No change in H&P    PHYSICIAN SIGNATURE:  Electronically signed by Jackie Quiñonez MD on 10/26/23 at 10:05 AM EDT

## 2023-10-26 NOTE — DISCHARGE SUMMARY
\"TROPHS\" in the last 72 hours. No results for input(s): \"LABA1C\" in the last 72 hours. No results for input(s): \"AST\", \"ALT\", \"BILIDIR\", \"BILITOT\", \"ALKPHOS\" in the last 72 hours. No results for input(s): \"INR\", \"LACTA\", \"TSH\" in the last 72 hours. Urine Cultures:   Lab Results   Component Value Date/Time    LABURIN No growth at 18 to 36 hours 05/29/2022 12:51 PM     Blood Cultures:   Lab Results   Component Value Date/Time    BC No Growth after 4 days of incubation. 09/27/2023 06:40 AM     Lab Results   Component Value Date/Time    BLOODCULT2 No Growth after 4 days of incubation.  09/27/2023 03:18 AM     Organism:   Lab Results   Component Value Date/Time    ORG Staph aureus MRSA 05/26/2023 01:23 PM       Signed:    Haider Salmeron MD

## 2023-10-26 NOTE — PLAN OF CARE
Increase function to baseline.
Increase patients ADLs/functional status to baseline.
Problem: Discharge Planning  Goal: Discharge to home or other facility with appropriate resources  10/24/2023 1034 by Meño Lew RN  Outcome: Progressing  Flowsheets (Taken 10/24/2023 0745)  Discharge to home or other facility with appropriate resources: Identify barriers to discharge with patient and caregiver     Problem: Pain  Goal: Verbalizes/displays adequate comfort level or baseline comfort level  10/24/2023 1034 by Meño Lew RN  Outcome: Progressing     Problem: Musculoskeletal - Adult  Goal: Return mobility to safest level of function  10/24/2023 1034 by Meño Lew RN  Outcome: Progressing     Problem: Musculoskeletal - Adult  Goal: Maintain proper alignment of affected body part  10/24/2023 1034 by Meño Lew RN  Outcome: Progressing     Problem: Metabolic/Fluid and Electrolytes - Adult  Goal: Electrolytes maintained within normal limits  10/24/2023 1034 by Meño Lew RN  Outcome: Progressing  Flowsheets (Taken 10/24/2023 0745)  Electrolytes maintained within normal limits: Monitor labs and assess patient for signs and symptoms of electrolyte imbalances     Problem: Metabolic/Fluid and Electrolytes - Adult  Goal: Glucose maintained within prescribed range  10/24/2023 1034 by Meño Lew RN  Outcome: Progressing  Flowsheets (Taken 10/24/2023 0745)  Glucose maintained within prescribed range: Monitor blood glucose as ordered     Problem: Safety - Adult  Goal: Free from fall injury  10/24/2023 1034 by Meño Lew RN  Outcome: Progressing     Problem: ABCDS Injury Assessment  Goal: Absence of physical injury  10/24/2023 1034 by Meño Lew RN  Outcome: Progressing     Problem: Chronic Conditions and Co-morbidities  Goal: Patient's chronic conditions and co-morbidity symptoms are monitored and maintained or improved  Outcome: Progressing  Flowsheets (Taken 10/24/2023 0745)  Care Plan - Patient's Chronic Conditions and Co-Morbidity Symptoms are Monitored and Maintained or
Problem: Discharge Planning  Goal: Discharge to home or other facility with appropriate resources  Outcome: Adequate for Discharge     Problem: Pain  Goal: Verbalizes/displays adequate comfort level or baseline comfort level  Outcome: Adequate for Discharge     Problem: Musculoskeletal - Adult  Goal: Return mobility to safest level of function  Outcome: Adequate for Discharge  Goal: Maintain proper alignment of affected body part  Outcome: Adequate for Discharge  Goal: Return ADL status to a safe level of function  Outcome: Adequate for Discharge     Problem: Metabolic/Fluid and Electrolytes - Adult  Goal: Electrolytes maintained within normal limits  Outcome: Adequate for Discharge  Goal: Glucose maintained within prescribed range  Outcome: Adequate for Discharge     Problem: Safety - Adult  Goal: Free from fall injury  Outcome: Adequate for Discharge     Problem: ABCDS Injury Assessment  Goal: Absence of physical injury  Outcome: Adequate for Discharge     Problem: Chronic Conditions and Co-morbidities  Goal: Patient's chronic conditions and co-morbidity symptoms are monitored and maintained or improved  Outcome: Adequate for Discharge
Problem: Discharge Planning  Goal: Discharge to home or other facility with appropriate resources  Outcome: Progressing     Problem: Pain  Goal: Verbalizes/displays adequate comfort level or baseline comfort level  Outcome: Progressing     Problem: Musculoskeletal - Adult  Goal: Return mobility to safest level of function  Outcome: Progressing  Goal: Maintain proper alignment of affected body part  Outcome: Progressing  Goal: Return ADL status to a safe level of function  Outcome: Progressing     Problem: Metabolic/Fluid and Electrolytes - Adult  Goal: Electrolytes maintained within normal limits  Outcome: Progressing  Goal: Glucose maintained within prescribed range  Outcome: Progressing     Problem: Safety - Adult  Goal: Free from fall injury  Outcome: Progressing     Problem: ABCDS Injury Assessment  Goal: Absence of physical injury  Outcome: Progressing     Problem: Chronic Conditions and Co-morbidities  Goal: Patient's chronic conditions and co-morbidity symptoms are monitored and maintained or improved  Outcome: Progressing
Problem: Pain  Goal: Verbalizes/displays adequate comfort level or baseline comfort level  Outcome: Progressing     Problem: Musculoskeletal - Adult  Goal: Return mobility to safest level of function  Outcome: Progressing     Problem: Musculoskeletal - Adult  Goal: Maintain proper alignment of affected body part  Outcome: Progressing     Problem: Metabolic/Fluid and Electrolytes - Adult  Goal: Electrolytes maintained within normal limits  Outcome: Progressing     Problem: ABCDS Injury Assessment  Goal: Absence of physical injury  Outcome: Progressing     Problem: Safety - Adult  Goal: Free from fall injury  Outcome: Progressing
(chronic obstructive pulmonary disease) ([address]), Depression, Diabetes mellitus ([address]), Difficult intravenous access, Emphysema of lung ([address]), ESRD (end stage renal disease) on dialysis ([address]), Fear of needles, Gastric ulcer, GERD (gastroesophageal reflux disease), Heart valve problem, Hemodialysis patient ([address]), History of spinal fracture, Hx of blood clots, Hyperlipidemia, Hypertension, MI (myocardial infarction) ([address]), Neuromuscular disorder ([address]), Numbness and tingling in left arm, Pneumonia, PONV (postoperative nausea and vomiting), Prolonged emergence from general anesthesia, Sleep apnea, Stroke ([address]), TIA (transient ischemic attack), and Unspecified diseases of blood and blood-forming organs. >>For CHF and Comorbidity documentation on Education Time and Topics, please see Education Tab    Progressive Mobility Assessment:  What is this patient's Current Level of Mobility?: Ambulatory-Up Ad Magalis  How was this patient Mobilized today?: Edge of Bed, Up to Chair,  Up to Toilet/Shower, and Up in Room, ambulated 5 ft                 With Whom? Self                 Level of Difficulty/Assistance: Independent     Pt resting in bed at this time on  4 L O2. Pt denies shortness of breath. Pt without lower extremity edema.      Patient and/or Family's stated Goal of Care this Admission: reduce shortness of breath, increase activity tolerance, better understand heart failure and disease management, be more comfortable, and reduce lower extremity edema prior to discharge        :

## 2023-10-27 ENCOUNTER — CARE COORDINATION (OUTPATIENT)
Dept: CASE MANAGEMENT | Age: 53
End: 2023-10-27

## 2023-10-27 NOTE — CARE COORDINATION
Care Transitions Outreach Attempt    Call within 2 business days of discharge: Yes   Attempted to reach patient for transitions of care follow up. Unable to reach patient. LVM. CTN contacted [facility] [place]. and spoke with Cm Martinez who verified manuela orders were received and manuela plotted for today. Patient: Maria L Cramer Patient : 1970 MRN: [id number]    Last Discharge Facility       Date Complaint Diagnosis Description Type Department Provider    10/23/23 Fall Hyperkalemia . .. ED to Hosp-Admission (Discharged) (ADMITTED) [place] [address] Shira Nieves MD; Nasim Wilcox... Was this an external facility discharge? No Discharge Facility Name: n.a    Noted following upcoming appointments from discharge chart review:   [facility] follow up appointment(s): No future appointments. Non-[facility]  follow up appointment(s):  Jaida RALPH, RN, [facility]  Care Transition Nurse  [phone] mobile

## 2023-10-30 ENCOUNTER — CARE COORDINATION (OUTPATIENT)
Dept: CASE MANAGEMENT | Age: 53
End: 2023-10-30

## 2023-10-30 NOTE — CARE COORDINATION
Care Transitions Outreach Attempt    Call within 2 business days of discharge: Yes   Attempted to reach patient for transitions of care follow up. Unable to reach patient. LVM. Patient: Sergio Isbell Patient : 1970 MRN: [id number]    Last Discharge Facility       Date Complaint Diagnosis Description Type Department Provider    10/23/23 Fall Hyperkalemia . .. ED to Hosp-Admission (Discharged) (ADMITTED) [facility] John Shine MD; Arun Pisano... Was this an external facility discharge? No Discharge Facility Name: n.a    Noted following upcoming appointments from discharge chart review:   [facility] follow up appointment(s): No future appointments. Non-[facility]  follow up appointment(s):  Tessie RALPH, RN, [facility]  Care Transition Nurse  [phone] mobile

## 2023-10-31 ENCOUNTER — APPOINTMENT (OUTPATIENT)
Dept: GENERAL RADIOLOGY | Age: 53
DRG: 640 | End: 2023-10-31
Payer: MEDICARE

## 2023-10-31 ENCOUNTER — HOSPITAL ENCOUNTER (INPATIENT)
Age: 53
LOS: 6 days | Discharge: NURSING, REHAB OR LONG-TERM CARE | DRG: 640 | End: 2023-11-06
Attending: EMERGENCY MEDICINE | Admitting: STUDENT IN AN ORGANIZED HEALTH CARE EDUCATION/TRAINING PROGRAM
Payer: MEDICARE

## 2023-10-31 DIAGNOSIS — E87.5 HYPERKALEMIA: ICD-10-CM

## 2023-10-31 DIAGNOSIS — N19 UREMIA: ICD-10-CM

## 2023-10-31 DIAGNOSIS — Z99.2 ESRD (END STAGE RENAL DISEASE) ON DIALYSIS (HCC): Primary | ICD-10-CM

## 2023-10-31 DIAGNOSIS — N18.6 ESRD (END STAGE RENAL DISEASE) ON DIALYSIS (HCC): Primary | ICD-10-CM

## 2023-10-31 DIAGNOSIS — R79.89 TROPONIN LEVEL ELEVATED: ICD-10-CM

## 2023-10-31 DIAGNOSIS — Z91.199 PERSONAL HISTORY OF NONCOMPLIANCE WITH MEDICAL TREATMENT, PRESENTING HAZARDS TO HEALTH: ICD-10-CM

## 2023-10-31 LAB
ANION GAP SERPL CALCULATED.3IONS-SCNC: 26 MMOL/L (ref 3–16)
BASE EXCESS BLDV CALC-SCNC: -8.3 MMOL/L (ref -3–3)
BASOPHILS # BLD: 0.1 K/UL (ref 0–0.2)
BASOPHILS NFR BLD: 0.9 %
BUN SERPL-MCNC: 102 MG/DL (ref 7–20)
CALCIUM SERPL-MCNC: 9.6 MG/DL (ref 8.3–10.6)
CHLORIDE SERPL-SCNC: 86 MMOL/L (ref 99–110)
CO2 BLDV-SCNC: 21 MMOL/L
CO2 SERPL-SCNC: 19 MMOL/L (ref 21–32)
COHGB MFR BLDV: 2.8 % (ref 0–1.5)
CREAT SERPL-MCNC: 12 MG/DL (ref 0.9–1.3)
DEPRECATED RDW RBC AUTO: 16.3 % (ref 12.4–15.4)
EKG ATRIAL RATE: 92 BPM
EKG DIAGNOSIS: NORMAL
EKG P AXIS: 71 DEGREES
EKG P-R INTERVAL: 190 MS
EKG Q-T INTERVAL: 354 MS
EKG QRS DURATION: 108 MS
EKG QTC CALCULATION (BAZETT): 437 MS
EKG R AXIS: -15 DEGREES
EKG T AXIS: 65 DEGREES
EKG VENTRICULAR RATE: 92 BPM
EOSINOPHIL # BLD: 0.2 K/UL (ref 0–0.6)
EOSINOPHIL NFR BLD: 2.5 %
GFR SERPLBLD CREATININE-BSD FMLA CKD-EPI: 4 ML/MIN/{1.73_M2}
GLUCOSE BLD-MCNC: 134 MG/DL (ref 70–99)
GLUCOSE SERPL-MCNC: 115 MG/DL (ref 70–99)
HCO3 BLDV-SCNC: 19.5 MMOL/L (ref 23–29)
HCT VFR BLD AUTO: 32.1 % (ref 40.5–52.5)
HGB BLD-MCNC: 10.2 G/DL (ref 13.5–17.5)
LACTATE BLDV-SCNC: 0.9 MMOL/L (ref 0.4–1.9)
LYMPHOCYTES # BLD: 0.4 K/UL (ref 1–5.1)
LYMPHOCYTES NFR BLD: 5.7 %
MCH RBC QN AUTO: 28.1 PG (ref 26–34)
MCHC RBC AUTO-ENTMCNC: 31.6 G/DL (ref 31–36)
MCV RBC AUTO: 89 FL (ref 80–100)
METHGB MFR BLDV: 0.3 %
MONOCYTES # BLD: 0.5 K/UL (ref 0–1.3)
MONOCYTES NFR BLD: 6.7 %
NEUTROPHILS # BLD: 6.4 K/UL (ref 1.7–7.7)
NEUTROPHILS NFR BLD: 84.2 %
NT-PROBNP SERPL-MCNC: ABNORMAL PG/ML (ref 0–124)
O2 THERAPY: ABNORMAL
PCO2 BLDV: 50.7 MMHG (ref 40–50)
PERFORMED ON: ABNORMAL
PH BLDV: 7.2 [PH] (ref 7.35–7.45)
PLATELET # BLD AUTO: 323 K/UL (ref 135–450)
PMV BLD AUTO: 7.5 FL (ref 5–10.5)
PO2 BLDV: 38.3 MMHG (ref 25–40)
POTASSIUM SERPL-SCNC: 6.5 MMOL/L (ref 3.5–5.1)
RBC # BLD AUTO: 3.61 M/UL (ref 4.2–5.9)
SAO2 % BLDV: 57 %
SODIUM SERPL-SCNC: 131 MMOL/L (ref 136–145)
TROPONIN, HIGH SENSITIVITY: 133 NG/L (ref 0–22)
TROPONIN, HIGH SENSITIVITY: 140 NG/L (ref 0–22)
WBC # BLD AUTO: 7.6 K/UL (ref 4–11)

## 2023-10-31 PROCEDURE — 80048 BASIC METABOLIC PNL TOTAL CA: CPT

## 2023-10-31 PROCEDURE — 83880 ASSAY OF NATRIURETIC PEPTIDE: CPT

## 2023-10-31 PROCEDURE — 96374 THER/PROPH/DIAG INJ IV PUSH: CPT

## 2023-10-31 PROCEDURE — 6360000002 HC RX W HCPCS: Performed by: EMERGENCY MEDICINE

## 2023-10-31 PROCEDURE — 73610 X-RAY EXAM OF ANKLE: CPT

## 2023-10-31 PROCEDURE — 2580000003 HC RX 258: Performed by: EMERGENCY MEDICINE

## 2023-10-31 PROCEDURE — 6370000000 HC RX 637 (ALT 250 FOR IP): Performed by: STUDENT IN AN ORGANIZED HEALTH CARE EDUCATION/TRAINING PROGRAM

## 2023-10-31 PROCEDURE — 71045 X-RAY EXAM CHEST 1 VIEW: CPT

## 2023-10-31 PROCEDURE — 90935 HEMODIALYSIS ONE EVALUATION: CPT

## 2023-10-31 PROCEDURE — 83605 ASSAY OF LACTIC ACID: CPT

## 2023-10-31 PROCEDURE — 5A09557 ASSISTANCE WITH RESPIRATORY VENTILATION, GREATER THAN 96 CONSECUTIVE HOURS, CONTINUOUS POSITIVE AIRWAY PRESSURE: ICD-10-PCS | Performed by: EMERGENCY MEDICINE

## 2023-10-31 PROCEDURE — 5A1D70Z PERFORMANCE OF URINARY FILTRATION, INTERMITTENT, LESS THAN 6 HOURS PER DAY: ICD-10-PCS | Performed by: INTERNAL MEDICINE

## 2023-10-31 PROCEDURE — 73630 X-RAY EXAM OF FOOT: CPT

## 2023-10-31 PROCEDURE — 96375 TX/PRO/DX INJ NEW DRUG ADDON: CPT

## 2023-10-31 PROCEDURE — 1200000000 HC SEMI PRIVATE

## 2023-10-31 PROCEDURE — 85025 COMPLETE CBC W/AUTO DIFF WBC: CPT

## 2023-10-31 PROCEDURE — 93010 ELECTROCARDIOGRAM REPORT: CPT | Performed by: INTERNAL MEDICINE

## 2023-10-31 PROCEDURE — 2580000003 HC RX 258: Performed by: STUDENT IN AN ORGANIZED HEALTH CARE EDUCATION/TRAINING PROGRAM

## 2023-10-31 PROCEDURE — 87040 BLOOD CULTURE FOR BACTERIA: CPT

## 2023-10-31 PROCEDURE — 94660 CPAP INITIATION&MGMT: CPT

## 2023-10-31 PROCEDURE — 99285 EMERGENCY DEPT VISIT HI MDM: CPT

## 2023-10-31 PROCEDURE — 84484 ASSAY OF TROPONIN QUANT: CPT

## 2023-10-31 PROCEDURE — 6360000002 HC RX W HCPCS: Performed by: STUDENT IN AN ORGANIZED HEALTH CARE EDUCATION/TRAINING PROGRAM

## 2023-10-31 PROCEDURE — 6370000000 HC RX 637 (ALT 250 FOR IP): Performed by: EMERGENCY MEDICINE

## 2023-10-31 PROCEDURE — 93005 ELECTROCARDIOGRAM TRACING: CPT | Performed by: EMERGENCY MEDICINE

## 2023-10-31 PROCEDURE — 82803 BLOOD GASES ANY COMBINATION: CPT

## 2023-10-31 PROCEDURE — 2500000003 HC RX 250 WO HCPCS: Performed by: EMERGENCY MEDICINE

## 2023-10-31 RX ORDER — ONDANSETRON 4 MG/1
4 TABLET, ORALLY DISINTEGRATING ORAL EVERY 8 HOURS PRN
Status: DISCONTINUED | OUTPATIENT
Start: 2023-10-31 | End: 2023-11-06 | Stop reason: HOSPADM

## 2023-10-31 RX ORDER — INSULIN LISPRO 100 [IU]/ML
0-8 INJECTION, SOLUTION INTRAVENOUS; SUBCUTANEOUS
Status: DISCONTINUED | OUTPATIENT
Start: 2023-10-31 | End: 2023-11-06 | Stop reason: HOSPADM

## 2023-10-31 RX ORDER — CLOPIDOGREL BISULFATE 75 MG/1
75 TABLET ORAL DAILY
Status: DISCONTINUED | OUTPATIENT
Start: 2023-10-31 | End: 2023-11-06 | Stop reason: HOSPADM

## 2023-10-31 RX ORDER — DEXTROSE MONOHYDRATE 100 MG/ML
INJECTION, SOLUTION INTRAVENOUS CONTINUOUS PRN
Status: DISCONTINUED | OUTPATIENT
Start: 2023-10-31 | End: 2023-10-31 | Stop reason: SDUPTHER

## 2023-10-31 RX ORDER — ISOSORBIDE DINITRATE 10 MG/1
20 TABLET ORAL 3 TIMES DAILY
Status: DISCONTINUED | OUTPATIENT
Start: 2023-10-31 | End: 2023-11-06 | Stop reason: HOSPADM

## 2023-10-31 RX ORDER — FUROSEMIDE 10 MG/ML
40 INJECTION INTRAMUSCULAR; INTRAVENOUS ONCE
Status: COMPLETED | OUTPATIENT
Start: 2023-10-31 | End: 2023-10-31

## 2023-10-31 RX ORDER — ACETAMINOPHEN 650 MG/1
650 SUPPOSITORY RECTAL EVERY 6 HOURS PRN
Status: DISCONTINUED | OUTPATIENT
Start: 2023-10-31 | End: 2023-11-06 | Stop reason: HOSPADM

## 2023-10-31 RX ORDER — SODIUM CHLORIDE 9 MG/ML
INJECTION, SOLUTION INTRAVENOUS PRN
Status: DISCONTINUED | OUTPATIENT
Start: 2023-10-31 | End: 2023-11-06 | Stop reason: HOSPADM

## 2023-10-31 RX ORDER — HEPARIN SODIUM 1000 [USP'U]/ML
3600 INJECTION, SOLUTION INTRAVENOUS; SUBCUTANEOUS PRN
Status: DISCONTINUED | OUTPATIENT
Start: 2023-10-31 | End: 2023-11-06 | Stop reason: HOSPADM

## 2023-10-31 RX ORDER — METOPROLOL SUCCINATE 50 MG/1
100 TABLET, EXTENDED RELEASE ORAL 2 TIMES DAILY
Status: DISCONTINUED | OUTPATIENT
Start: 2023-10-31 | End: 2023-11-06 | Stop reason: HOSPADM

## 2023-10-31 RX ORDER — POLYETHYLENE GLYCOL 3350 17 G/17G
17 POWDER, FOR SOLUTION ORAL DAILY PRN
Status: DISCONTINUED | OUTPATIENT
Start: 2023-10-31 | End: 2023-11-06 | Stop reason: HOSPADM

## 2023-10-31 RX ORDER — GLUCAGON 1 MG/ML
1 KIT INJECTION PRN
Status: DISCONTINUED | OUTPATIENT
Start: 2023-10-31 | End: 2023-10-31 | Stop reason: SDUPTHER

## 2023-10-31 RX ORDER — HEPARIN SODIUM 1000 [USP'U]/ML
INJECTION, SOLUTION INTRAVENOUS; SUBCUTANEOUS
Status: DISPENSED
Start: 2023-10-31 | End: 2023-11-01

## 2023-10-31 RX ORDER — SODIUM CHLORIDE 0.9 % (FLUSH) 0.9 %
5-40 SYRINGE (ML) INJECTION PRN
Status: DISCONTINUED | OUTPATIENT
Start: 2023-10-31 | End: 2023-11-06 | Stop reason: HOSPADM

## 2023-10-31 RX ORDER — TRAZODONE HYDROCHLORIDE 50 MG/1
50 TABLET ORAL NIGHTLY
Status: DISCONTINUED | OUTPATIENT
Start: 2023-10-31 | End: 2023-11-06 | Stop reason: HOSPADM

## 2023-10-31 RX ORDER — ACETAMINOPHEN 325 MG/1
650 TABLET ORAL EVERY 6 HOURS PRN
Status: DISCONTINUED | OUTPATIENT
Start: 2023-10-31 | End: 2023-11-06 | Stop reason: HOSPADM

## 2023-10-31 RX ORDER — SODIUM CHLORIDE 0.9 % (FLUSH) 0.9 %
5-40 SYRINGE (ML) INJECTION EVERY 12 HOURS SCHEDULED
Status: DISCONTINUED | OUTPATIENT
Start: 2023-10-31 | End: 2023-11-06 | Stop reason: HOSPADM

## 2023-10-31 RX ORDER — ONDANSETRON 2 MG/ML
4 INJECTION INTRAMUSCULAR; INTRAVENOUS EVERY 6 HOURS PRN
Status: DISCONTINUED | OUTPATIENT
Start: 2023-10-31 | End: 2023-11-06 | Stop reason: HOSPADM

## 2023-10-31 RX ORDER — INSULIN LISPRO 100 [IU]/ML
0-4 INJECTION, SOLUTION INTRAVENOUS; SUBCUTANEOUS NIGHTLY
Status: DISCONTINUED | OUTPATIENT
Start: 2023-10-31 | End: 2023-11-06 | Stop reason: HOSPADM

## 2023-10-31 RX ORDER — PANTOPRAZOLE SODIUM 40 MG/1
40 TABLET, DELAYED RELEASE ORAL
Status: DISCONTINUED | OUTPATIENT
Start: 2023-11-01 | End: 2023-11-06 | Stop reason: HOSPADM

## 2023-10-31 RX ORDER — PRAVASTATIN SODIUM 40 MG
40 TABLET ORAL DAILY
Status: DISCONTINUED | OUTPATIENT
Start: 2023-10-31 | End: 2023-11-06 | Stop reason: HOSPADM

## 2023-10-31 RX ORDER — GLUCAGON 1 MG/ML
1 KIT INJECTION PRN
Status: DISCONTINUED | OUTPATIENT
Start: 2023-10-31 | End: 2023-11-06 | Stop reason: HOSPADM

## 2023-10-31 RX ORDER — QUETIAPINE FUMARATE 25 MG/1
50 TABLET, FILM COATED ORAL NIGHTLY
Status: DISCONTINUED | OUTPATIENT
Start: 2023-10-31 | End: 2023-11-06 | Stop reason: HOSPADM

## 2023-10-31 RX ORDER — DULOXETIN HYDROCHLORIDE 60 MG/1
60 CAPSULE, DELAYED RELEASE ORAL DAILY
Status: DISCONTINUED | OUTPATIENT
Start: 2023-11-01 | End: 2023-11-06 | Stop reason: HOSPADM

## 2023-10-31 RX ORDER — SODIUM POLYSTYRENE SULFONATE 15 G/60ML
15 SUSPENSION ORAL; RECTAL ONCE
Status: COMPLETED | OUTPATIENT
Start: 2023-10-31 | End: 2023-10-31

## 2023-10-31 RX ORDER — DEXTROSE MONOHYDRATE 100 MG/ML
INJECTION, SOLUTION INTRAVENOUS CONTINUOUS PRN
Status: DISCONTINUED | OUTPATIENT
Start: 2023-10-31 | End: 2023-11-06 | Stop reason: HOSPADM

## 2023-10-31 RX ADMIN — SODIUM BICARBONATE 50 MEQ: 84 INJECTION, SOLUTION INTRAVENOUS at 14:52

## 2023-10-31 RX ADMIN — DEXTROSE MONOHYDRATE 250 ML: 100 INJECTION, SOLUTION INTRAVENOUS at 14:48

## 2023-10-31 RX ADMIN — SODIUM POLYSTYRENE SULFONATE 15 G: 15 SUSPENSION ORAL; RECTAL at 14:51

## 2023-10-31 RX ADMIN — VANCOMYCIN HYDROCHLORIDE 2500 MG: 1 INJECTION, POWDER, LYOPHILIZED, FOR SOLUTION INTRAVENOUS at 16:03

## 2023-10-31 RX ADMIN — INSULIN HUMAN 10 UNITS: 100 INJECTION, SOLUTION PARENTERAL at 15:00

## 2023-10-31 RX ADMIN — FUROSEMIDE 40 MG: 10 INJECTION, SOLUTION INTRAMUSCULAR; INTRAVENOUS at 14:51

## 2023-10-31 RX ADMIN — CLOPIDOGREL BISULFATE 75 MG: 75 TABLET, FILM COATED ORAL at 16:48

## 2023-10-31 ASSESSMENT — ENCOUNTER SYMPTOMS
SORE THROAT: 0
RHINORRHEA: 0
SHORTNESS OF BREATH: 1
EYE REDNESS: 0
ABDOMINAL PAIN: 0
COUGH: 0

## 2023-10-31 ASSESSMENT — PAIN DESCRIPTION - LOCATION: LOCATION: CHEST

## 2023-10-31 ASSESSMENT — PAIN - FUNCTIONAL ASSESSMENT: PAIN_FUNCTIONAL_ASSESSMENT: 0-10

## 2023-10-31 ASSESSMENT — PAIN SCALES - GENERAL: PAINLEVEL_OUTOF10: 8

## 2023-10-31 NOTE — ACP (ADVANCE CARE PLANNING)
Advance Care Planning     General Advance Care Planning (ACP) Conversation    Date of Conversation: 10/31/2023  Conducted with: Patient with Decision Making Capacity    Healthcare Decision Maker:    Primary Decision Maker: [address] - [phone]    Secondary Decision Maker: Tiffany Brito - Brother/Sister - [phone]  Click here to complete [address] including selection of the Healthcare Decision Maker Relationship (ie \"Primary\"). Today we documented Decision Maker(s) consistent with Legal Next of Kin hierarchy. Content/Action Overview:   Has ACP document(s) on file - reflects the patient's care preferences          Length of Voluntary ACP Conversation in minutes:  <16 minutes (Non-Billable)    NINO Navarro

## 2023-10-31 NOTE — ED PROVIDER NOTES
Medications   glucose chewable tablet 16 g (has no administration in time range)   dextrose bolus 10% 125 mL (has no administration in time range)     Or   dextrose bolus 10% 250 mL (has no administration in time range)   glucagon injection 1 mg (has no administration in time range)   dextrose 10 % infusion (has no administration in time range)   insulin regular (HUMULIN R;NOVOLIN R) injection 10 Units (10 Units IntraVENous Given 10/31/23 1500)     And   dextrose bolus 10% 250 mL (250 mLs IntraVENous New Bag 10/31/23 1448)   sodium bicarbonate 8.4 % injection 50 mEq (50 mEq IntraVENous Given 10/31/23 1452)   furosemide (LASIX) injection 40 mg (40 mg IntraVENous Given 10/31/23 1451)   sodium polystyrene (KAYEXALATE) 15 GM/60ML suspension 15 g (15 g Oral Given 10/31/23 1451)        CONSULTS: (Who and What was discussed)  IP CONSULT TO NEPHROLOGY    Discussion with Other Profesionals : Admitting Team      Social Determinants : None    Records Reviewed : None    Chronic Conditions:   has a past medical history of Ambulatory dysfunction, Aortic stenosis, Arthritis, Asthma, 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/2013), CAD (coronary artery disease), Cardiomyopathy ([address]) (04/19/2019), CHF (congestive heart failure) ([address]), Chronic pain, COPD (chronic obstructive pulmonary disease) ([address]), Depression, Diabetes mellitus ([address]), Difficult intravenous access, Emphysema of lung ([address]), ESRD (end stage renal disease) on dialysis ([address]), Fear of needles, Gastric ulcer, GERD (gastroesophageal reflux disease), Heart valve problem, Hemodialysis patient ([address]), History of spinal fracture, blood clots, Hyperlipidemia, Hypertension, MI (myocardial infarction) ([address]) (2019), Neuromuscular disorder ([address]), Numbness and tingling in left arm, Pneumonia, PONV (postoperative nausea and vomiting), Prolonged emergence from general anesthesia, Sleep apnea, Stroke ([address]), TIA (transient ischemic attack), and Unspecified diseases of blood and

## 2023-10-31 NOTE — H&P
regarding Александр Osman and agree with hospitalisation. EKG interpreted personally and results are NSR with no acute ST-T wave changes     Imaging that was interpreted personally includes XR chest, XR foot and the results are as below     Drugs that require monitoring for toxicity include Eliquis and the way of monitoring is daily CBC      Comment: Please note this report has been produced using speech recognition software and may contain errors related to that system including errors in grammar, punctuation, and spelling, as well as words and phrases that may be inappropriate. If there are any questions or concerns please feel free to contact the dictating provider for clarification. Disposition:   Current Living situation: Home  Expected Disposition: TBD  Estimated D/C: 2 days    Diet Diet NPO   DVT Prophylaxis [] Lovenox, []  Heparin, [] SCDs, [] Ambulation,  [x] Eliquis, [] Xarelto   Code Status Prior   Surrogate Decision Maker/ POA      History from:     patient    History of Present Illness:     Chief Complaint: Fluid overload  Ezequiel French is a 52 y.o. male with pmh as above who presents with worsening shortness of breath and right ankle pain ongoing for last 3 to 4 days. Patient states that he was discharged on Friday from the hospital he was supposed to go to hemodialysis yesterday but he was having pain in his right ankle and swelling which is why he could not go to the dialysis as his wife was not able to get him into the car. He states that the right ankle swelling and pain has been going on for 3 to 4 days he feels that he twisted his ankle on the day of discharge. States that he is compliant with his medication denies any chest pain, fever, chills, abdominal pain, urinary or bowel changes.      Review of Systems: Need 10 Elements   Review of Systems as above    Objective:   No intake or output data in the 24 hours ending 10/31/23 1519   Vitals:   Vitals:    10/31/23 1405 10/31/23 1451 10/31/23

## 2023-11-01 PROBLEM — S82.841A BIMALLEOLAR FRACTURE OF RIGHT ANKLE, CLOSED, INITIAL ENCOUNTER: Status: ACTIVE | Noted: 2023-11-01

## 2023-11-01 LAB
ANION GAP SERPL CALCULATED.3IONS-SCNC: 18 MMOL/L (ref 3–16)
BASOPHILS # BLD: 0.1 K/UL (ref 0–0.2)
BASOPHILS NFR BLD: 0.7 %
BUN SERPL-MCNC: 52 MG/DL (ref 7–20)
CALCIUM SERPL-MCNC: 9.1 MG/DL (ref 8.3–10.6)
CHLORIDE SERPL-SCNC: 91 MMOL/L (ref 99–110)
CO2 SERPL-SCNC: 24 MMOL/L (ref 21–32)
CREAT SERPL-MCNC: 7.7 MG/DL (ref 0.9–1.3)
DEPRECATED RDW RBC AUTO: 16 % (ref 12.4–15.4)
EOSINOPHIL # BLD: 0.3 K/UL (ref 0–0.6)
EOSINOPHIL NFR BLD: 3.9 %
GFR SERPLBLD CREATININE-BSD FMLA CKD-EPI: 8 ML/MIN/{1.73_M2}
GLUCOSE BLD-MCNC: 123 MG/DL (ref 70–99)
GLUCOSE BLD-MCNC: 146 MG/DL (ref 70–99)
GLUCOSE BLD-MCNC: 189 MG/DL (ref 70–99)
GLUCOSE BLD-MCNC: 307 MG/DL (ref 70–99)
GLUCOSE BLD-MCNC: 75 MG/DL (ref 70–99)
GLUCOSE SERPL-MCNC: 169 MG/DL (ref 70–99)
HCT VFR BLD AUTO: 30.3 % (ref 40.5–52.5)
HGB BLD-MCNC: 9.9 G/DL (ref 13.5–17.5)
IRON SATN MFR SERPL: 17 % (ref 20–50)
IRON SERPL-MCNC: 34 UG/DL (ref 59–158)
LYMPHOCYTES # BLD: 0.4 K/UL (ref 1–5.1)
LYMPHOCYTES NFR BLD: 5.7 %
MCH RBC QN AUTO: 28.6 PG (ref 26–34)
MCHC RBC AUTO-ENTMCNC: 32.5 G/DL (ref 31–36)
MCV RBC AUTO: 87.9 FL (ref 80–100)
MONOCYTES # BLD: 0.7 K/UL (ref 0–1.3)
MONOCYTES NFR BLD: 8.6 %
NEUTROPHILS # BLD: 6.2 K/UL (ref 1.7–7.7)
NEUTROPHILS NFR BLD: 81.1 %
PERFORMED ON: ABNORMAL
PERFORMED ON: NORMAL
PLATELET # BLD AUTO: 317 K/UL (ref 135–450)
PMV BLD AUTO: 7.6 FL (ref 5–10.5)
POTASSIUM SERPL-SCNC: 4.5 MMOL/L (ref 3.5–5.1)
RBC # BLD AUTO: 3.45 M/UL (ref 4.2–5.9)
SODIUM SERPL-SCNC: 133 MMOL/L (ref 136–145)
TIBC SERPL-MCNC: 201 UG/DL (ref 260–445)
VANCOMYCIN SERPL-MCNC: 17.2 UG/ML
WBC # BLD AUTO: 7.6 K/UL (ref 4–11)

## 2023-11-01 PROCEDURE — 1200000000 HC SEMI PRIVATE

## 2023-11-01 PROCEDURE — 83550 IRON BINDING TEST: CPT

## 2023-11-01 PROCEDURE — 2060000000 HC ICU INTERMEDIATE R&B

## 2023-11-01 PROCEDURE — 86706 HEP B SURFACE ANTIBODY: CPT

## 2023-11-01 PROCEDURE — 86704 HEP B CORE ANTIBODY TOTAL: CPT

## 2023-11-01 PROCEDURE — 6360000002 HC RX W HCPCS: Performed by: STUDENT IN AN ORGANIZED HEALTH CARE EDUCATION/TRAINING PROGRAM

## 2023-11-01 PROCEDURE — 2700000000 HC OXYGEN THERAPY PER DAY

## 2023-11-01 PROCEDURE — 97166 OT EVAL MOD COMPLEX 45 MIN: CPT

## 2023-11-01 PROCEDURE — 94640 AIRWAY INHALATION TREATMENT: CPT

## 2023-11-01 PROCEDURE — 94761 N-INVAS EAR/PLS OXIMETRY MLT: CPT

## 2023-11-01 PROCEDURE — 80202 ASSAY OF VANCOMYCIN: CPT

## 2023-11-01 PROCEDURE — 6370000000 HC RX 637 (ALT 250 FOR IP): Performed by: STUDENT IN AN ORGANIZED HEALTH CARE EDUCATION/TRAINING PROGRAM

## 2023-11-01 PROCEDURE — 36415 COLL VENOUS BLD VENIPUNCTURE: CPT

## 2023-11-01 PROCEDURE — 2580000003 HC RX 258: Performed by: STUDENT IN AN ORGANIZED HEALTH CARE EDUCATION/TRAINING PROGRAM

## 2023-11-01 PROCEDURE — 6370000000 HC RX 637 (ALT 250 FOR IP): Performed by: SPECIALIST/TECHNOLOGIST

## 2023-11-01 PROCEDURE — 6370000000 HC RX 637 (ALT 250 FOR IP): Performed by: INTERNAL MEDICINE

## 2023-11-01 PROCEDURE — 85025 COMPLETE CBC W/AUTO DIFF WBC: CPT

## 2023-11-01 PROCEDURE — 83540 ASSAY OF IRON: CPT

## 2023-11-01 PROCEDURE — 94660 CPAP INITIATION&MGMT: CPT

## 2023-11-01 PROCEDURE — 97162 PT EVAL MOD COMPLEX 30 MIN: CPT

## 2023-11-01 PROCEDURE — 97530 THERAPEUTIC ACTIVITIES: CPT

## 2023-11-01 PROCEDURE — 87340 HEPATITIS B SURFACE AG IA: CPT

## 2023-11-01 PROCEDURE — 99231 SBSQ HOSP IP/OBS SF/LOW 25: CPT | Performed by: SPECIALIST/TECHNOLOGIST

## 2023-11-01 PROCEDURE — 80048 BASIC METABOLIC PNL TOTAL CA: CPT

## 2023-11-01 RX ORDER — OXYCODONE HYDROCHLORIDE 5 MG/1
5 TABLET ORAL EVERY 4 HOURS PRN
Status: DISCONTINUED | OUTPATIENT
Start: 2023-11-01 | End: 2023-11-06 | Stop reason: HOSPADM

## 2023-11-01 RX ORDER — INSULIN GLARGINE 100 [IU]/ML
10 INJECTION, SOLUTION SUBCUTANEOUS NIGHTLY
Status: DISCONTINUED | OUTPATIENT
Start: 2023-11-01 | End: 2023-11-06 | Stop reason: HOSPADM

## 2023-11-01 RX ORDER — OXYCODONE HYDROCHLORIDE 5 MG/1
2.5 TABLET ORAL EVERY 4 HOURS PRN
Status: DISCONTINUED | OUTPATIENT
Start: 2023-11-01 | End: 2023-11-06 | Stop reason: HOSPADM

## 2023-11-01 RX ORDER — ACETAMINOPHEN 650 MG
TABLET, EXTENDED RELEASE ORAL DAILY
Status: DISCONTINUED | OUTPATIENT
Start: 2023-11-01 | End: 2023-11-06 | Stop reason: HOSPADM

## 2023-11-01 RX ORDER — CASTOR OIL AND BALSAM, PERU 788; 87 MG/G; MG/G
OINTMENT TOPICAL DAILY
Status: DISCONTINUED | OUTPATIENT
Start: 2023-11-01 | End: 2023-11-06 | Stop reason: HOSPADM

## 2023-11-01 RX ORDER — IPRATROPIUM BROMIDE AND ALBUTEROL SULFATE 2.5; .5 MG/3ML; MG/3ML
1 SOLUTION RESPIRATORY (INHALATION)
Status: DISCONTINUED | OUTPATIENT
Start: 2023-11-01 | End: 2023-11-06 | Stop reason: HOSPADM

## 2023-11-01 RX ORDER — OXYCODONE HYDROCHLORIDE AND ACETAMINOPHEN 5; 325 MG/1; MG/1
1 TABLET ORAL EVERY 4 HOURS PRN
Status: DISCONTINUED | OUTPATIENT
Start: 2023-11-01 | End: 2023-11-01

## 2023-11-01 RX ORDER — ACETAMINOPHEN 325 MG/1
650 TABLET ORAL EVERY 6 HOURS
Status: DISCONTINUED | OUTPATIENT
Start: 2023-11-01 | End: 2023-11-06 | Stop reason: HOSPADM

## 2023-11-01 RX ADMIN — ONDANSETRON 4 MG: 2 INJECTION INTRAMUSCULAR; INTRAVENOUS at 18:55

## 2023-11-01 RX ADMIN — METOPROLOL SUCCINATE 100 MG: 50 TABLET, EXTENDED RELEASE ORAL at 00:13

## 2023-11-01 RX ADMIN — VANCOMYCIN HYDROCHLORIDE 500 MG: 500 INJECTION, POWDER, LYOPHILIZED, FOR SOLUTION INTRAVENOUS at 21:04

## 2023-11-01 RX ADMIN — TRAZODONE HYDROCHLORIDE 50 MG: 50 TABLET ORAL at 20:49

## 2023-11-01 RX ADMIN — METOPROLOL SUCCINATE 100 MG: 50 TABLET, EXTENDED RELEASE ORAL at 20:48

## 2023-11-01 RX ADMIN — APIXABAN 5 MG: 5 TABLET, FILM COATED ORAL at 00:13

## 2023-11-01 RX ADMIN — CLOPIDOGREL BISULFATE 75 MG: 75 TABLET, FILM COATED ORAL at 10:20

## 2023-11-01 RX ADMIN — INSULIN LISPRO 4 UNITS: 100 INJECTION, SOLUTION INTRAVENOUS; SUBCUTANEOUS at 20:50

## 2023-11-01 RX ADMIN — ISOSORBIDE DINITRATE 20 MG: 20 TABLET ORAL at 17:06

## 2023-11-01 RX ADMIN — PANTOPRAZOLE SODIUM 40 MG: 40 TABLET, DELAYED RELEASE ORAL at 06:06

## 2023-11-01 RX ADMIN — ISOSORBIDE DINITRATE 20 MG: 20 TABLET ORAL at 20:48

## 2023-11-01 RX ADMIN — ISOSORBIDE DINITRATE 20 MG: 20 TABLET ORAL at 10:20

## 2023-11-01 RX ADMIN — METOPROLOL SUCCINATE 100 MG: 50 TABLET, EXTENDED RELEASE ORAL at 10:20

## 2023-11-01 RX ADMIN — ISOSORBIDE DINITRATE 20 MG: 20 TABLET ORAL at 00:13

## 2023-11-01 RX ADMIN — PRAVASTATIN SODIUM 40 MG: 40 TABLET ORAL at 00:13

## 2023-11-01 RX ADMIN — SODIUM CHLORIDE, PRESERVATIVE FREE 10 ML: 5 INJECTION INTRAVENOUS at 10:21

## 2023-11-01 RX ADMIN — APIXABAN 5 MG: 5 TABLET, FILM COATED ORAL at 20:48

## 2023-11-01 RX ADMIN — DULOXETINE HYDROCHLORIDE 60 MG: 60 CAPSULE, DELAYED RELEASE ORAL at 10:20

## 2023-11-01 RX ADMIN — OXYCODONE HYDROCHLORIDE 5 MG: 5 TABLET ORAL at 17:06

## 2023-11-01 RX ADMIN — QUETIAPINE FUMARATE 50 MG: 25 TABLET ORAL at 20:48

## 2023-11-01 RX ADMIN — IPRATROPIUM BROMIDE AND ALBUTEROL SULFATE 1 DOSE: 2.5; .5 SOLUTION RESPIRATORY (INHALATION) at 16:23

## 2023-11-01 RX ADMIN — SODIUM CHLORIDE: 9 INJECTION, SOLUTION INTRAVENOUS at 21:03

## 2023-11-01 RX ADMIN — SODIUM CHLORIDE, PRESERVATIVE FREE 10 ML: 5 INJECTION INTRAVENOUS at 00:16

## 2023-11-01 RX ADMIN — PRAVASTATIN SODIUM 40 MG: 40 TABLET ORAL at 10:20

## 2023-11-01 RX ADMIN — TRAZODONE HYDROCHLORIDE 50 MG: 50 TABLET ORAL at 00:13

## 2023-11-01 RX ADMIN — SODIUM CHLORIDE, PRESERVATIVE FREE 10 ML: 5 INJECTION INTRAVENOUS at 21:01

## 2023-11-01 RX ADMIN — QUETIAPINE FUMARATE 50 MG: 25 TABLET ORAL at 00:13

## 2023-11-01 RX ADMIN — APIXABAN 5 MG: 5 TABLET, FILM COATED ORAL at 10:20

## 2023-11-01 ASSESSMENT — PAIN DESCRIPTION - PAIN TYPE: TYPE: ACUTE PAIN

## 2023-11-01 ASSESSMENT — PAIN SCALES - GENERAL
PAINLEVEL_OUTOF10: 0
PAINLEVEL_OUTOF10: 9
PAINLEVEL_OUTOF10: 4
PAINLEVEL_OUTOF10: 0
PAINLEVEL_OUTOF10: 9
PAINLEVEL_OUTOF10: 9

## 2023-11-01 ASSESSMENT — PAIN DESCRIPTION - ONSET: ONSET: ON-GOING

## 2023-11-01 ASSESSMENT — PAIN DESCRIPTION - DESCRIPTORS
DESCRIPTORS: DISCOMFORT
DESCRIPTORS: DISCOMFORT

## 2023-11-01 ASSESSMENT — PAIN DESCRIPTION - LOCATION
LOCATION: BACK

## 2023-11-01 ASSESSMENT — PAIN DESCRIPTION - ORIENTATION
ORIENTATION: LOWER
ORIENTATION: LOWER

## 2023-11-01 ASSESSMENT — PAIN DESCRIPTION - FREQUENCY: FREQUENCY: CONTINUOUS

## 2023-11-02 LAB
ALBUMIN SERPL-MCNC: 3.3 G/DL (ref 3.4–5)
ANION GAP SERPL CALCULATED.3IONS-SCNC: 15 MMOL/L (ref 3–16)
BASOPHILS # BLD: 0.1 K/UL (ref 0–0.2)
BASOPHILS NFR BLD: 1 %
BUN SERPL-MCNC: 63 MG/DL (ref 7–20)
CALCIUM SERPL-MCNC: 9 MG/DL (ref 8.3–10.6)
CHLORIDE SERPL-SCNC: 94 MMOL/L (ref 99–110)
CO2 SERPL-SCNC: 25 MMOL/L (ref 21–32)
CREAT SERPL-MCNC: 8.4 MG/DL (ref 0.9–1.3)
DEPRECATED RDW RBC AUTO: 16.1 % (ref 12.4–15.4)
EOSINOPHIL # BLD: 0.3 K/UL (ref 0–0.6)
EOSINOPHIL NFR BLD: 5.4 %
GFR SERPLBLD CREATININE-BSD FMLA CKD-EPI: 7 ML/MIN/{1.73_M2}
GLUCOSE BLD-MCNC: 123 MG/DL (ref 70–99)
GLUCOSE BLD-MCNC: 134 MG/DL (ref 70–99)
GLUCOSE BLD-MCNC: 179 MG/DL (ref 70–99)
GLUCOSE BLD-MCNC: 213 MG/DL (ref 70–99)
GLUCOSE SERPL-MCNC: 119 MG/DL (ref 70–99)
HBV SURFACE AB SERPL IA-ACNC: 15.62 MIU/ML
HBV SURFACE AG SERPL QL IA: NORMAL
HCT VFR BLD AUTO: 27.6 % (ref 40.5–52.5)
HGB BLD-MCNC: 8.9 G/DL (ref 13.5–17.5)
LYMPHOCYTES # BLD: 0.6 K/UL (ref 1–5.1)
LYMPHOCYTES NFR BLD: 9.4 %
MAGNESIUM SERPL-MCNC: 2 MG/DL (ref 1.8–2.4)
MCH RBC QN AUTO: 28.5 PG (ref 26–34)
MCHC RBC AUTO-ENTMCNC: 32.3 G/DL (ref 31–36)
MCV RBC AUTO: 88.2 FL (ref 80–100)
MONOCYTES # BLD: 0.8 K/UL (ref 0–1.3)
MONOCYTES NFR BLD: 13.2 %
NEUTROPHILS # BLD: 4.3 K/UL (ref 1.7–7.7)
NEUTROPHILS NFR BLD: 71 %
PERFORMED ON: ABNORMAL
PHOSPHATE SERPL-MCNC: 8.7 MG/DL (ref 2.5–4.9)
PLATELET # BLD AUTO: 261 K/UL (ref 135–450)
PMV BLD AUTO: 7.3 FL (ref 5–10.5)
POTASSIUM SERPL-SCNC: 5 MMOL/L (ref 3.5–5.1)
RBC # BLD AUTO: 3.13 M/UL (ref 4.2–5.9)
SODIUM SERPL-SCNC: 134 MMOL/L (ref 136–145)
WBC # BLD AUTO: 6.1 K/UL (ref 4–11)

## 2023-11-02 PROCEDURE — 1200000000 HC SEMI PRIVATE

## 2023-11-02 PROCEDURE — 94660 CPAP INITIATION&MGMT: CPT

## 2023-11-02 PROCEDURE — 90935 HEMODIALYSIS ONE EVALUATION: CPT

## 2023-11-02 PROCEDURE — 2700000000 HC OXYGEN THERAPY PER DAY

## 2023-11-02 PROCEDURE — 80069 RENAL FUNCTION PANEL: CPT

## 2023-11-02 PROCEDURE — 85025 COMPLETE CBC W/AUTO DIFF WBC: CPT

## 2023-11-02 PROCEDURE — 6370000000 HC RX 637 (ALT 250 FOR IP): Performed by: INTERNAL MEDICINE

## 2023-11-02 PROCEDURE — 6370000000 HC RX 637 (ALT 250 FOR IP): Performed by: SPECIALIST/TECHNOLOGIST

## 2023-11-02 PROCEDURE — 6370000000 HC RX 637 (ALT 250 FOR IP): Performed by: STUDENT IN AN ORGANIZED HEALTH CARE EDUCATION/TRAINING PROGRAM

## 2023-11-02 PROCEDURE — 94640 AIRWAY INHALATION TREATMENT: CPT

## 2023-11-02 PROCEDURE — 94761 N-INVAS EAR/PLS OXIMETRY MLT: CPT

## 2023-11-02 PROCEDURE — 2580000003 HC RX 258: Performed by: STUDENT IN AN ORGANIZED HEALTH CARE EDUCATION/TRAINING PROGRAM

## 2023-11-02 PROCEDURE — 83735 ASSAY OF MAGNESIUM: CPT

## 2023-11-02 RX ORDER — DOXYCYCLINE HYCLATE 100 MG
100 TABLET ORAL EVERY 12 HOURS SCHEDULED
Status: DISCONTINUED | OUTPATIENT
Start: 2023-11-02 | End: 2023-11-06 | Stop reason: HOSPADM

## 2023-11-02 RX ORDER — HEPARIN SODIUM 1000 [USP'U]/ML
INJECTION, SOLUTION INTRAVENOUS; SUBCUTANEOUS
Status: DISPENSED
Start: 2023-11-02 | End: 2023-11-02

## 2023-11-02 RX ADMIN — METOPROLOL SUCCINATE 100 MG: 50 TABLET, EXTENDED RELEASE ORAL at 19:37

## 2023-11-02 RX ADMIN — IPRATROPIUM BROMIDE AND ALBUTEROL SULFATE 1 DOSE: 2.5; .5 SOLUTION RESPIRATORY (INHALATION) at 20:32

## 2023-11-02 RX ADMIN — IPRATROPIUM BROMIDE AND ALBUTEROL SULFATE 1 DOSE: 2.5; .5 SOLUTION RESPIRATORY (INHALATION) at 08:13

## 2023-11-02 RX ADMIN — ISOSORBIDE DINITRATE 20 MG: 20 TABLET ORAL at 13:09

## 2023-11-02 RX ADMIN — APIXABAN 5 MG: 5 TABLET, FILM COATED ORAL at 13:09

## 2023-11-02 RX ADMIN — INSULIN GLARGINE 10 UNITS: 100 INJECTION, SOLUTION SUBCUTANEOUS at 19:38

## 2023-11-02 RX ADMIN — DOXYCYCLINE HYCLATE 100 MG: 100 TABLET, COATED ORAL at 19:37

## 2023-11-02 RX ADMIN — IPRATROPIUM BROMIDE AND ALBUTEROL SULFATE 1 DOSE: 2.5; .5 SOLUTION RESPIRATORY (INHALATION) at 11:11

## 2023-11-02 RX ADMIN — CLOPIDOGREL BISULFATE 75 MG: 75 TABLET, FILM COATED ORAL at 13:10

## 2023-11-02 RX ADMIN — SODIUM CHLORIDE, PRESERVATIVE FREE 1 ML: 5 INJECTION INTRAVENOUS at 19:38

## 2023-11-02 RX ADMIN — QUETIAPINE FUMARATE 50 MG: 25 TABLET ORAL at 19:37

## 2023-11-02 RX ADMIN — INSULIN LISPRO 2 UNITS: 100 INJECTION, SOLUTION INTRAVENOUS; SUBCUTANEOUS at 13:30

## 2023-11-02 RX ADMIN — TRAZODONE HYDROCHLORIDE 50 MG: 50 TABLET ORAL at 19:37

## 2023-11-02 RX ADMIN — PANTOPRAZOLE SODIUM 40 MG: 40 TABLET, DELAYED RELEASE ORAL at 06:32

## 2023-11-02 RX ADMIN — OXYCODONE HYDROCHLORIDE 5 MG: 5 TABLET ORAL at 06:32

## 2023-11-02 RX ADMIN — METOPROLOL SUCCINATE 100 MG: 50 TABLET, EXTENDED RELEASE ORAL at 13:10

## 2023-11-02 RX ADMIN — OXYCODONE HYDROCHLORIDE 5 MG: 5 TABLET ORAL at 00:01

## 2023-11-02 RX ADMIN — OXYCODONE HYDROCHLORIDE 5 MG: 5 TABLET ORAL at 10:40

## 2023-11-02 RX ADMIN — APIXABAN 5 MG: 5 TABLET, FILM COATED ORAL at 19:37

## 2023-11-02 RX ADMIN — PRAVASTATIN SODIUM 40 MG: 40 TABLET ORAL at 13:09

## 2023-11-02 RX ADMIN — ISOSORBIDE DINITRATE 20 MG: 20 TABLET ORAL at 19:37

## 2023-11-02 RX ADMIN — INSULIN GLARGINE 10 UNITS: 100 INJECTION, SOLUTION SUBCUTANEOUS at 00:01

## 2023-11-02 RX ADMIN — DULOXETINE HYDROCHLORIDE 60 MG: 60 CAPSULE, DELAYED RELEASE ORAL at 13:10

## 2023-11-02 RX ADMIN — SODIUM CHLORIDE, PRESERVATIVE FREE 8 ML: 5 INJECTION INTRAVENOUS at 13:31

## 2023-11-02 ASSESSMENT — PAIN SCALES - GENERAL
PAINLEVEL_OUTOF10: 8
PAINLEVEL_OUTOF10: 9
PAINLEVEL_OUTOF10: 9
PAINLEVEL_OUTOF10: 8

## 2023-11-02 ASSESSMENT — PAIN DESCRIPTION - DESCRIPTORS
DESCRIPTORS: ACHING

## 2023-11-02 ASSESSMENT — PAIN DESCRIPTION - LOCATION
LOCATION: FOOT;LEG
LOCATION: ANKLE;LEG
LOCATION: ANKLE;LEG

## 2023-11-02 ASSESSMENT — PAIN DESCRIPTION - ORIENTATION
ORIENTATION: RIGHT

## 2023-11-02 NOTE — DISCHARGE INSTR - COC
Continuity of Care Form    Patient Name: Lakia Ann   :  1970  MRN:  [id number]    Admit date:  10/31/2023  Discharge date:  2023    Code Status Order: Full Code   Advance Directives:     Admitting Physician:  Primitivo Lomax MD  PCP: JAY Barraza CNP    Discharging Nurse: [facility] Unit/Room#: 0218/0218-01  Discharging Unit Phone Number: [phone]    Emergency Contact:   Extended Emergency Contact Information  Primary Emergency Contact: Crystal Ann  Address: [address], [address] [facility] [address] [address] [address] Phone: [phone]  Mobile Phone: [phone]  Relation: Spouse  Secondary Emergency Contact: ALICIA HARO JR. [facility] Phone: [phone]  Mobile Phone: [phone]  Relation: Brother/Sister  Preferred language: English   needed?  No    Past Surgical History:  Past Surgical History:   Procedure Laterality Date    AORTIC VALVE REPLACEMENT N/A 10/15/2019    TRANSCATHETER AORTIC VALVE REPLACEMENT FEMORAL APPROACH performed by Guy Marina MD at [address] [facility] Right 2019    PERITONEAL DIALYSIS CATHETER REMOVAL performed by Estella Disla MD at [address] [place] [address]      [facility]    CORONARY ANGIOPLASTY WITH STENT PLACEMENT  05/26/15    CYST REMOVAL  2013    EXCISION CYSTS, NECK X2 AND ABDOMINAL benign    DIAGNOSTIC CARDIAC CATH LAB PROCEDURE      DIALYSIS FISTULA CREATION Left 10/30/2017    LEFT BRACHIAL CEPHALIC FISTULA    DIALYSIS FISTULA CREATION Left 3/27/2019    LIGATION  AV FISTULA performed by Osiel Mckenzie MD at [address] [facility], [facility] Right 2023    INCISION AND DEBRIDEMENT RIGHT FOOT WITH performed by Anya Johnson DPM at [address] [facility] Right 2023    RIGHT FIFTH RAY AMPUTATION performed by Anya Johnson DPM at [address] [facility]

## 2023-11-03 LAB
ALBUMIN SERPL-MCNC: 3.2 G/DL (ref 3.4–5)
ANION GAP SERPL CALCULATED.3IONS-SCNC: 15 MMOL/L (ref 3–16)
BASOPHILS # BLD: 0.1 K/UL (ref 0–0.2)
BASOPHILS NFR BLD: 1.3 %
BUN SERPL-MCNC: 53 MG/DL (ref 7–20)
CALCIUM SERPL-MCNC: 9.4 MG/DL (ref 8.3–10.6)
CHLORIDE SERPL-SCNC: 92 MMOL/L (ref 99–110)
CO2 SERPL-SCNC: 25 MMOL/L (ref 21–32)
CREAT SERPL-MCNC: 7.2 MG/DL (ref 0.9–1.3)
DEPRECATED RDW RBC AUTO: 16 % (ref 12.4–15.4)
EOSINOPHIL # BLD: 0.5 K/UL (ref 0–0.6)
EOSINOPHIL NFR BLD: 7 %
GFR SERPLBLD CREATININE-BSD FMLA CKD-EPI: 8 ML/MIN/{1.73_M2}
GLUCOSE BLD-MCNC: 193 MG/DL (ref 70–99)
GLUCOSE BLD-MCNC: 196 MG/DL (ref 70–99)
GLUCOSE BLD-MCNC: 223 MG/DL (ref 70–99)
GLUCOSE SERPL-MCNC: 188 MG/DL (ref 70–99)
HBV CORE AB SERPL QL IA: NEGATIVE
HCT VFR BLD AUTO: 30.1 % (ref 40.5–52.5)
HGB BLD-MCNC: 9.5 G/DL (ref 13.5–17.5)
LYMPHOCYTES # BLD: 0.8 K/UL (ref 1–5.1)
LYMPHOCYTES NFR BLD: 10.5 %
MAGNESIUM SERPL-MCNC: 2.1 MG/DL (ref 1.8–2.4)
MCH RBC QN AUTO: 28.3 PG (ref 26–34)
MCHC RBC AUTO-ENTMCNC: 31.5 G/DL (ref 31–36)
MCV RBC AUTO: 89.7 FL (ref 80–100)
MONOCYTES # BLD: 0.8 K/UL (ref 0–1.3)
MONOCYTES NFR BLD: 10.9 %
NEUTROPHILS # BLD: 5.1 K/UL (ref 1.7–7.7)
NEUTROPHILS NFR BLD: 70.3 %
PERFORMED ON: ABNORMAL
PHOSPHATE SERPL-MCNC: 7 MG/DL (ref 2.5–4.9)
PLATELET # BLD AUTO: 282 K/UL (ref 135–450)
PMV BLD AUTO: 7.3 FL (ref 5–10.5)
POTASSIUM SERPL-SCNC: 5.5 MMOL/L (ref 3.5–5.1)
RBC # BLD AUTO: 3.35 M/UL (ref 4.2–5.9)
SODIUM SERPL-SCNC: 132 MMOL/L (ref 136–145)
WBC # BLD AUTO: 7.2 K/UL (ref 4–11)

## 2023-11-03 PROCEDURE — 6370000000 HC RX 637 (ALT 250 FOR IP): Performed by: SPECIALIST/TECHNOLOGIST

## 2023-11-03 PROCEDURE — 94640 AIRWAY INHALATION TREATMENT: CPT

## 2023-11-03 PROCEDURE — 6370000000 HC RX 637 (ALT 250 FOR IP): Performed by: INTERNAL MEDICINE

## 2023-11-03 PROCEDURE — 85025 COMPLETE CBC W/AUTO DIFF WBC: CPT

## 2023-11-03 PROCEDURE — 6370000000 HC RX 637 (ALT 250 FOR IP): Performed by: STUDENT IN AN ORGANIZED HEALTH CARE EDUCATION/TRAINING PROGRAM

## 2023-11-03 PROCEDURE — 36415 COLL VENOUS BLD VENIPUNCTURE: CPT

## 2023-11-03 PROCEDURE — 80069 RENAL FUNCTION PANEL: CPT

## 2023-11-03 PROCEDURE — 94761 N-INVAS EAR/PLS OXIMETRY MLT: CPT

## 2023-11-03 PROCEDURE — 1200000000 HC SEMI PRIVATE

## 2023-11-03 PROCEDURE — 94660 CPAP INITIATION&MGMT: CPT

## 2023-11-03 PROCEDURE — 83735 ASSAY OF MAGNESIUM: CPT

## 2023-11-03 PROCEDURE — 2700000000 HC OXYGEN THERAPY PER DAY

## 2023-11-03 PROCEDURE — 2580000003 HC RX 258: Performed by: STUDENT IN AN ORGANIZED HEALTH CARE EDUCATION/TRAINING PROGRAM

## 2023-11-03 RX ORDER — DOXYCYCLINE HYCLATE 100 MG
100 TABLET ORAL EVERY 12 HOURS SCHEDULED
Qty: 10 TABLET | Refills: 0 | Status: SHIPPED | OUTPATIENT
Start: 2023-11-03 | End: 2023-11-08

## 2023-11-03 RX ADMIN — APIXABAN 5 MG: 5 TABLET, FILM COATED ORAL at 21:06

## 2023-11-03 RX ADMIN — TRAZODONE HYDROCHLORIDE 50 MG: 50 TABLET ORAL at 21:06

## 2023-11-03 RX ADMIN — OXYCODONE HYDROCHLORIDE 5 MG: 5 TABLET ORAL at 09:52

## 2023-11-03 RX ADMIN — DULOXETINE HYDROCHLORIDE 60 MG: 60 CAPSULE, DELAYED RELEASE ORAL at 09:47

## 2023-11-03 RX ADMIN — QUETIAPINE FUMARATE 50 MG: 25 TABLET ORAL at 21:06

## 2023-11-03 RX ADMIN — PRAVASTATIN SODIUM 40 MG: 40 TABLET ORAL at 09:47

## 2023-11-03 RX ADMIN — ISOSORBIDE DINITRATE 20 MG: 20 TABLET ORAL at 09:48

## 2023-11-03 RX ADMIN — OXYCODONE HYDROCHLORIDE 5 MG: 5 TABLET ORAL at 18:52

## 2023-11-03 RX ADMIN — CLOPIDOGREL BISULFATE 75 MG: 75 TABLET, FILM COATED ORAL at 09:47

## 2023-11-03 RX ADMIN — SODIUM CHLORIDE, PRESERVATIVE FREE 10 ML: 5 INJECTION INTRAVENOUS at 21:07

## 2023-11-03 RX ADMIN — INSULIN GLARGINE 10 UNITS: 100 INJECTION, SOLUTION SUBCUTANEOUS at 21:07

## 2023-11-03 RX ADMIN — ISOSORBIDE DINITRATE 20 MG: 20 TABLET ORAL at 21:06

## 2023-11-03 RX ADMIN — OXYCODONE HYDROCHLORIDE 5 MG: 5 TABLET ORAL at 13:55

## 2023-11-03 RX ADMIN — DOXYCYCLINE HYCLATE 100 MG: 100 TABLET, COATED ORAL at 21:06

## 2023-11-03 RX ADMIN — DOXYCYCLINE HYCLATE 100 MG: 100 TABLET, COATED ORAL at 09:47

## 2023-11-03 RX ADMIN — METOPROLOL SUCCINATE 100 MG: 50 TABLET, EXTENDED RELEASE ORAL at 09:47

## 2023-11-03 RX ADMIN — APIXABAN 5 MG: 5 TABLET, FILM COATED ORAL at 09:47

## 2023-11-03 RX ADMIN — IPRATROPIUM BROMIDE AND ALBUTEROL SULFATE 1 DOSE: 2.5; .5 SOLUTION RESPIRATORY (INHALATION) at 19:37

## 2023-11-03 RX ADMIN — IPRATROPIUM BROMIDE AND ALBUTEROL SULFATE 1 DOSE: 2.5; .5 SOLUTION RESPIRATORY (INHALATION) at 11:49

## 2023-11-03 RX ADMIN — METOPROLOL SUCCINATE 100 MG: 50 TABLET, EXTENDED RELEASE ORAL at 21:06

## 2023-11-03 ASSESSMENT — PAIN DESCRIPTION - FREQUENCY: FREQUENCY: CONTINUOUS

## 2023-11-03 ASSESSMENT — PAIN DESCRIPTION - LOCATION
LOCATION: BACK
LOCATION: BACK

## 2023-11-03 ASSESSMENT — PAIN DESCRIPTION - PAIN TYPE: TYPE: CHRONIC PAIN

## 2023-11-03 ASSESSMENT — PAIN DESCRIPTION - DESCRIPTORS: DESCRIPTORS: ACHING

## 2023-11-03 ASSESSMENT — PAIN SCALES - GENERAL
PAINLEVEL_OUTOF10: 9
PAINLEVEL_OUTOF10: 4
PAINLEVEL_OUTOF10: 6
PAINLEVEL_OUTOF10: 9
PAINLEVEL_OUTOF10: 9

## 2023-11-03 ASSESSMENT — PAIN - FUNCTIONAL ASSESSMENT: PAIN_FUNCTIONAL_ASSESSMENT: ACTIVITIES ARE NOT PREVENTED

## 2023-11-03 ASSESSMENT — PAIN DESCRIPTION - ONSET: ONSET: ON-GOING

## 2023-11-03 ASSESSMENT — PAIN DESCRIPTION - ORIENTATION: ORIENTATION: LOWER

## 2023-11-04 LAB
ALBUMIN SERPL-MCNC: 3.3 G/DL (ref 3.4–5)
ANION GAP SERPL CALCULATED.3IONS-SCNC: 15 MMOL/L (ref 3–16)
BACTERIA BLD CULT: NORMAL
BASOPHILS # BLD: 0.1 K/UL (ref 0–0.2)
BASOPHILS NFR BLD: 0.7 %
BUN SERPL-MCNC: 75 MG/DL (ref 7–20)
CALCIUM SERPL-MCNC: 9.1 MG/DL (ref 8.3–10.6)
CHLORIDE SERPL-SCNC: 92 MMOL/L (ref 99–110)
CO2 SERPL-SCNC: 25 MMOL/L (ref 21–32)
CREAT SERPL-MCNC: 8.6 MG/DL (ref 0.9–1.3)
DEPRECATED RDW RBC AUTO: 16.1 % (ref 12.4–15.4)
EOSINOPHIL # BLD: 0.4 K/UL (ref 0–0.6)
EOSINOPHIL NFR BLD: 5.6 %
GFR SERPLBLD CREATININE-BSD FMLA CKD-EPI: 7 ML/MIN/{1.73_M2}
GLUCOSE BLD-MCNC: 128 MG/DL (ref 70–99)
GLUCOSE BLD-MCNC: 132 MG/DL (ref 70–99)
GLUCOSE BLD-MCNC: 212 MG/DL (ref 70–99)
GLUCOSE SERPL-MCNC: 128 MG/DL (ref 70–99)
HCT VFR BLD AUTO: 27.7 % (ref 40.5–52.5)
HGB BLD-MCNC: 9 G/DL (ref 13.5–17.5)
LYMPHOCYTES # BLD: 0.8 K/UL (ref 1–5.1)
LYMPHOCYTES NFR BLD: 10.6 %
MAGNESIUM SERPL-MCNC: 2 MG/DL (ref 1.8–2.4)
MCH RBC QN AUTO: 28.3 PG (ref 26–34)
MCHC RBC AUTO-ENTMCNC: 32.3 G/DL (ref 31–36)
MCV RBC AUTO: 87.5 FL (ref 80–100)
MONOCYTES # BLD: 0.7 K/UL (ref 0–1.3)
MONOCYTES NFR BLD: 8.8 %
NEUTROPHILS # BLD: 5.8 K/UL (ref 1.7–7.7)
NEUTROPHILS NFR BLD: 74.3 %
NT-PROBNP SERPL-MCNC: ABNORMAL PG/ML (ref 0–124)
PERFORMED ON: ABNORMAL
PHOSPHATE SERPL-MCNC: 6.4 MG/DL (ref 2.5–4.9)
PLATELET # BLD AUTO: 277 K/UL (ref 135–450)
PMV BLD AUTO: 7.8 FL (ref 5–10.5)
POTASSIUM SERPL-SCNC: 5.1 MMOL/L (ref 3.5–5.1)
RBC # BLD AUTO: 3.16 M/UL (ref 4.2–5.9)
SODIUM SERPL-SCNC: 132 MMOL/L (ref 136–145)
WBC # BLD AUTO: 7.8 K/UL (ref 4–11)

## 2023-11-04 PROCEDURE — 94660 CPAP INITIATION&MGMT: CPT

## 2023-11-04 PROCEDURE — 6370000000 HC RX 637 (ALT 250 FOR IP): Performed by: INTERNAL MEDICINE

## 2023-11-04 PROCEDURE — 2580000003 HC RX 258: Performed by: STUDENT IN AN ORGANIZED HEALTH CARE EDUCATION/TRAINING PROGRAM

## 2023-11-04 PROCEDURE — 2700000000 HC OXYGEN THERAPY PER DAY

## 2023-11-04 PROCEDURE — 94761 N-INVAS EAR/PLS OXIMETRY MLT: CPT

## 2023-11-04 PROCEDURE — 80069 RENAL FUNCTION PANEL: CPT

## 2023-11-04 PROCEDURE — 36415 COLL VENOUS BLD VENIPUNCTURE: CPT

## 2023-11-04 PROCEDURE — 90935 HEMODIALYSIS ONE EVALUATION: CPT

## 2023-11-04 PROCEDURE — 6370000000 HC RX 637 (ALT 250 FOR IP): Performed by: SPECIALIST/TECHNOLOGIST

## 2023-11-04 PROCEDURE — 6370000000 HC RX 637 (ALT 250 FOR IP): Performed by: STUDENT IN AN ORGANIZED HEALTH CARE EDUCATION/TRAINING PROGRAM

## 2023-11-04 PROCEDURE — 85025 COMPLETE CBC W/AUTO DIFF WBC: CPT

## 2023-11-04 PROCEDURE — 83880 ASSAY OF NATRIURETIC PEPTIDE: CPT

## 2023-11-04 PROCEDURE — 36556 INSERT NON-TUNNEL CV CATH: CPT

## 2023-11-04 PROCEDURE — 1200000000 HC SEMI PRIVATE

## 2023-11-04 PROCEDURE — 6360000002 HC RX W HCPCS: Performed by: INTERNAL MEDICINE

## 2023-11-04 PROCEDURE — 94640 AIRWAY INHALATION TREATMENT: CPT

## 2023-11-04 PROCEDURE — 83735 ASSAY OF MAGNESIUM: CPT

## 2023-11-04 RX ORDER — HEPARIN SODIUM 1000 [USP'U]/ML
INJECTION, SOLUTION INTRAVENOUS; SUBCUTANEOUS
Status: DISPENSED
Start: 2023-11-04 | End: 2023-11-04

## 2023-11-04 RX ADMIN — PANTOPRAZOLE SODIUM 40 MG: 40 TABLET, DELAYED RELEASE ORAL at 05:11

## 2023-11-04 RX ADMIN — ISOSORBIDE DINITRATE 20 MG: 20 TABLET ORAL at 13:40

## 2023-11-04 RX ADMIN — DOXYCYCLINE HYCLATE 100 MG: 100 TABLET, COATED ORAL at 12:55

## 2023-11-04 RX ADMIN — DOXYCYCLINE HYCLATE 100 MG: 100 TABLET, COATED ORAL at 21:20

## 2023-11-04 RX ADMIN — ISOSORBIDE DINITRATE 20 MG: 20 TABLET ORAL at 21:20

## 2023-11-04 RX ADMIN — SODIUM CHLORIDE, PRESERVATIVE FREE 10 ML: 5 INJECTION INTRAVENOUS at 21:19

## 2023-11-04 RX ADMIN — METOPROLOL SUCCINATE 100 MG: 50 TABLET, EXTENDED RELEASE ORAL at 12:52

## 2023-11-04 RX ADMIN — DULOXETINE HYDROCHLORIDE 60 MG: 60 CAPSULE, DELAYED RELEASE ORAL at 12:52

## 2023-11-04 RX ADMIN — METOPROLOL SUCCINATE 100 MG: 50 TABLET, EXTENDED RELEASE ORAL at 21:20

## 2023-11-04 RX ADMIN — APIXABAN 5 MG: 5 TABLET, FILM COATED ORAL at 12:55

## 2023-11-04 RX ADMIN — INSULIN LISPRO 2 UNITS: 100 INJECTION, SOLUTION INTRAVENOUS; SUBCUTANEOUS at 17:37

## 2023-11-04 RX ADMIN — HEPARIN SODIUM 3600 UNITS: 1000 INJECTION, SOLUTION INTRAVENOUS; SUBCUTANEOUS at 12:44

## 2023-11-04 RX ADMIN — INSULIN GLARGINE 10 UNITS: 100 INJECTION, SOLUTION SUBCUTANEOUS at 21:20

## 2023-11-04 RX ADMIN — APIXABAN 5 MG: 5 TABLET, FILM COATED ORAL at 21:20

## 2023-11-04 RX ADMIN — OXYCODONE HYDROCHLORIDE 5 MG: 5 TABLET ORAL at 05:12

## 2023-11-04 RX ADMIN — CLOPIDOGREL BISULFATE 75 MG: 75 TABLET, FILM COATED ORAL at 12:55

## 2023-11-04 RX ADMIN — TRAZODONE HYDROCHLORIDE 50 MG: 50 TABLET ORAL at 21:20

## 2023-11-04 RX ADMIN — ACETAMINOPHEN 650 MG: 325 TABLET ORAL at 21:20

## 2023-11-04 RX ADMIN — QUETIAPINE FUMARATE 50 MG: 25 TABLET ORAL at 21:20

## 2023-11-04 RX ADMIN — PRAVASTATIN SODIUM 40 MG: 40 TABLET ORAL at 12:55

## 2023-11-04 RX ADMIN — IPRATROPIUM BROMIDE AND ALBUTEROL SULFATE 1 DOSE: 2.5; .5 SOLUTION RESPIRATORY (INHALATION) at 18:12

## 2023-11-04 ASSESSMENT — PAIN SCALES - GENERAL
PAINLEVEL_OUTOF10: 6
PAINLEVEL_OUTOF10: 3
PAINLEVEL_OUTOF10: 8

## 2023-11-04 ASSESSMENT — PAIN DESCRIPTION - PAIN TYPE: TYPE: CHRONIC PAIN

## 2023-11-04 ASSESSMENT — PAIN DESCRIPTION - ONSET: ONSET: ON-GOING

## 2023-11-04 ASSESSMENT — PAIN DESCRIPTION - FREQUENCY: FREQUENCY: CONTINUOUS

## 2023-11-04 ASSESSMENT — PAIN DESCRIPTION - DESCRIPTORS: DESCRIPTORS: ACHING

## 2023-11-04 ASSESSMENT — PAIN - FUNCTIONAL ASSESSMENT: PAIN_FUNCTIONAL_ASSESSMENT: PREVENTS OR INTERFERES SOME ACTIVE ACTIVITIES AND ADLS

## 2023-11-04 ASSESSMENT — PAIN DESCRIPTION - LOCATION: LOCATION: BACK

## 2023-11-04 ASSESSMENT — PAIN DESCRIPTION - ORIENTATION: ORIENTATION: LOWER

## 2023-11-05 LAB
ALBUMIN SERPL-MCNC: 3.6 G/DL (ref 3.4–5)
ANION GAP SERPL CALCULATED.3IONS-SCNC: 15 MMOL/L (ref 3–16)
ANION GAP SERPL CALCULATED.3IONS-SCNC: 16 MMOL/L (ref 3–16)
BASOPHILS # BLD: 0.1 K/UL (ref 0–0.2)
BASOPHILS NFR BLD: 0.9 %
BUN SERPL-MCNC: 45 MG/DL (ref 7–20)
BUN SERPL-MCNC: 45 MG/DL (ref 7–20)
CALCIUM SERPL-MCNC: 9.6 MG/DL (ref 8.3–10.6)
CALCIUM SERPL-MCNC: 9.8 MG/DL (ref 8.3–10.6)
CHLORIDE SERPL-SCNC: 92 MMOL/L (ref 99–110)
CHLORIDE SERPL-SCNC: 94 MMOL/L (ref 99–110)
CO2 SERPL-SCNC: 23 MMOL/L (ref 21–32)
CO2 SERPL-SCNC: 23 MMOL/L (ref 21–32)
CREAT SERPL-MCNC: 5.7 MG/DL (ref 0.9–1.3)
CREAT SERPL-MCNC: 5.9 MG/DL (ref 0.9–1.3)
DEPRECATED RDW RBC AUTO: 15.9 % (ref 12.4–15.4)
EOSINOPHIL # BLD: 0.4 K/UL (ref 0–0.6)
EOSINOPHIL NFR BLD: 5.2 %
GFR SERPLBLD CREATININE-BSD FMLA CKD-EPI: 11 ML/MIN/{1.73_M2}
GFR SERPLBLD CREATININE-BSD FMLA CKD-EPI: 11 ML/MIN/{1.73_M2}
GLUCOSE BLD-MCNC: 104 MG/DL (ref 70–99)
GLUCOSE BLD-MCNC: 174 MG/DL (ref 70–99)
GLUCOSE BLD-MCNC: 185 MG/DL (ref 70–99)
GLUCOSE BLD-MCNC: 189 MG/DL (ref 70–99)
GLUCOSE SERPL-MCNC: 106 MG/DL (ref 70–99)
GLUCOSE SERPL-MCNC: 107 MG/DL (ref 70–99)
HCT VFR BLD AUTO: 31.1 % (ref 40.5–52.5)
HGB BLD-MCNC: 10 G/DL (ref 13.5–17.5)
LYMPHOCYTES # BLD: 0.8 K/UL (ref 1–5.1)
LYMPHOCYTES NFR BLD: 9.7 %
MAGNESIUM SERPL-MCNC: 2 MG/DL (ref 1.8–2.4)
MCH RBC QN AUTO: 28.4 PG (ref 26–34)
MCHC RBC AUTO-ENTMCNC: 32.3 G/DL (ref 31–36)
MCV RBC AUTO: 88.2 FL (ref 80–100)
MONOCYTES # BLD: 0.7 K/UL (ref 0–1.3)
MONOCYTES NFR BLD: 9.4 %
NEUTROPHILS # BLD: 6 K/UL (ref 1.7–7.7)
NEUTROPHILS NFR BLD: 74.8 %
PERFORMED ON: ABNORMAL
PHOSPHATE SERPL-MCNC: 5.1 MG/DL (ref 2.5–4.9)
PLATELET # BLD AUTO: 309 K/UL (ref 135–450)
PMV BLD AUTO: 7.4 FL (ref 5–10.5)
POTASSIUM SERPL-SCNC: 4.8 MMOL/L (ref 3.5–5.1)
POTASSIUM SERPL-SCNC: 4.8 MMOL/L (ref 3.5–5.1)
RBC # BLD AUTO: 3.52 M/UL (ref 4.2–5.9)
SODIUM SERPL-SCNC: 131 MMOL/L (ref 136–145)
SODIUM SERPL-SCNC: 132 MMOL/L (ref 136–145)
WBC # BLD AUTO: 8 K/UL (ref 4–11)

## 2023-11-05 PROCEDURE — 36415 COLL VENOUS BLD VENIPUNCTURE: CPT

## 2023-11-05 PROCEDURE — 94640 AIRWAY INHALATION TREATMENT: CPT

## 2023-11-05 PROCEDURE — 6370000000 HC RX 637 (ALT 250 FOR IP): Performed by: STUDENT IN AN ORGANIZED HEALTH CARE EDUCATION/TRAINING PROGRAM

## 2023-11-05 PROCEDURE — 6370000000 HC RX 637 (ALT 250 FOR IP): Performed by: INTERNAL MEDICINE

## 2023-11-05 PROCEDURE — 2700000000 HC OXYGEN THERAPY PER DAY

## 2023-11-05 PROCEDURE — 85025 COMPLETE CBC W/AUTO DIFF WBC: CPT

## 2023-11-05 PROCEDURE — 83735 ASSAY OF MAGNESIUM: CPT

## 2023-11-05 PROCEDURE — 80069 RENAL FUNCTION PANEL: CPT

## 2023-11-05 PROCEDURE — 6370000000 HC RX 637 (ALT 250 FOR IP): Performed by: SPECIALIST/TECHNOLOGIST

## 2023-11-05 PROCEDURE — 94761 N-INVAS EAR/PLS OXIMETRY MLT: CPT

## 2023-11-05 PROCEDURE — 2580000003 HC RX 258: Performed by: STUDENT IN AN ORGANIZED HEALTH CARE EDUCATION/TRAINING PROGRAM

## 2023-11-05 PROCEDURE — 1200000000 HC SEMI PRIVATE

## 2023-11-05 RX ADMIN — APIXABAN 5 MG: 5 TABLET, FILM COATED ORAL at 08:39

## 2023-11-05 RX ADMIN — ISOSORBIDE DINITRATE 20 MG: 20 TABLET ORAL at 13:29

## 2023-11-05 RX ADMIN — ACETAMINOPHEN 650 MG: 325 TABLET ORAL at 21:59

## 2023-11-05 RX ADMIN — CASTOR OIL AND BALSAM, PERU: 788; 87 OINTMENT TOPICAL at 08:58

## 2023-11-05 RX ADMIN — DULOXETINE HYDROCHLORIDE 60 MG: 60 CAPSULE, DELAYED RELEASE ORAL at 08:39

## 2023-11-05 RX ADMIN — QUETIAPINE FUMARATE 50 MG: 25 TABLET ORAL at 21:59

## 2023-11-05 RX ADMIN — ISOSORBIDE DINITRATE 20 MG: 20 TABLET ORAL at 22:00

## 2023-11-05 RX ADMIN — DOXYCYCLINE HYCLATE 100 MG: 100 TABLET, COATED ORAL at 08:39

## 2023-11-05 RX ADMIN — INSULIN GLARGINE 10 UNITS: 100 INJECTION, SOLUTION SUBCUTANEOUS at 21:58

## 2023-11-05 RX ADMIN — OXYCODONE HYDROCHLORIDE 5 MG: 5 TABLET ORAL at 06:01

## 2023-11-05 RX ADMIN — PANTOPRAZOLE SODIUM 40 MG: 40 TABLET, DELAYED RELEASE ORAL at 05:58

## 2023-11-05 RX ADMIN — IPRATROPIUM BROMIDE AND ALBUTEROL SULFATE 1 DOSE: 2.5; .5 SOLUTION RESPIRATORY (INHALATION) at 19:36

## 2023-11-05 RX ADMIN — CLOPIDOGREL BISULFATE 75 MG: 75 TABLET, FILM COATED ORAL at 08:39

## 2023-11-05 RX ADMIN — TRAZODONE HYDROCHLORIDE 50 MG: 50 TABLET ORAL at 21:59

## 2023-11-05 RX ADMIN — Medication: at 08:58

## 2023-11-05 RX ADMIN — APIXABAN 5 MG: 5 TABLET, FILM COATED ORAL at 21:59

## 2023-11-05 RX ADMIN — PRAVASTATIN SODIUM 40 MG: 40 TABLET ORAL at 08:39

## 2023-11-05 RX ADMIN — METOPROLOL SUCCINATE 100 MG: 50 TABLET, EXTENDED RELEASE ORAL at 08:39

## 2023-11-05 RX ADMIN — DOXYCYCLINE HYCLATE 100 MG: 100 TABLET, COATED ORAL at 22:00

## 2023-11-05 RX ADMIN — OXYCODONE HYDROCHLORIDE 5 MG: 5 TABLET ORAL at 10:01

## 2023-11-05 RX ADMIN — IPRATROPIUM BROMIDE AND ALBUTEROL SULFATE 1 DOSE: 2.5; .5 SOLUTION RESPIRATORY (INHALATION) at 07:37

## 2023-11-05 RX ADMIN — ISOSORBIDE DINITRATE 20 MG: 20 TABLET ORAL at 08:39

## 2023-11-05 RX ADMIN — SODIUM CHLORIDE, PRESERVATIVE FREE 10 ML: 5 INJECTION INTRAVENOUS at 22:38

## 2023-11-05 RX ADMIN — IPRATROPIUM BROMIDE AND ALBUTEROL SULFATE 1 DOSE: 2.5; .5 SOLUTION RESPIRATORY (INHALATION) at 15:24

## 2023-11-05 RX ADMIN — METOPROLOL SUCCINATE 100 MG: 50 TABLET, EXTENDED RELEASE ORAL at 21:59

## 2023-11-05 RX ADMIN — IPRATROPIUM BROMIDE AND ALBUTEROL SULFATE 1 DOSE: 2.5; .5 SOLUTION RESPIRATORY (INHALATION) at 11:19

## 2023-11-05 RX ADMIN — SODIUM CHLORIDE, PRESERVATIVE FREE 10 ML: 5 INJECTION INTRAVENOUS at 08:38

## 2023-11-05 ASSESSMENT — PAIN SCALES - GENERAL
PAINLEVEL_OUTOF10: 9
PAINLEVEL_OUTOF10: 8
PAINLEVEL_OUTOF10: 9

## 2023-11-05 ASSESSMENT — PAIN DESCRIPTION - LOCATION
LOCATION: BACK
LOCATION: BACK

## 2023-11-05 ASSESSMENT — PAIN DESCRIPTION - ORIENTATION: ORIENTATION: LOWER

## 2023-11-06 VITALS
SYSTOLIC BLOOD PRESSURE: 164 MMHG | HEIGHT: 69 IN | OXYGEN SATURATION: 100 % | TEMPERATURE: 97.9 F | BODY MASS INDEX: 31.61 KG/M2 | RESPIRATION RATE: 18 BRPM | HEART RATE: 71 BPM | DIASTOLIC BLOOD PRESSURE: 81 MMHG | WEIGHT: 213.41 LBS

## 2023-11-06 LAB
ALBUMIN SERPL-MCNC: 3.1 G/DL (ref 3.4–5)
ANION GAP SERPL CALCULATED.3IONS-SCNC: 18 MMOL/L (ref 3–16)
BASOPHILS # BLD: 0.1 K/UL (ref 0–0.2)
BASOPHILS NFR BLD: 1.2 %
BUN SERPL-MCNC: 67 MG/DL (ref 7–20)
CALCIUM SERPL-MCNC: 9.4 MG/DL (ref 8.3–10.6)
CHLORIDE SERPL-SCNC: 91 MMOL/L (ref 99–110)
CO2 SERPL-SCNC: 19 MMOL/L (ref 21–32)
CREAT SERPL-MCNC: 7.7 MG/DL (ref 0.9–1.3)
DEPRECATED RDW RBC AUTO: 16.4 % (ref 12.4–15.4)
EOSINOPHIL # BLD: 0.5 K/UL (ref 0–0.6)
EOSINOPHIL NFR BLD: 6.2 %
GFR SERPLBLD CREATININE-BSD FMLA CKD-EPI: 8 ML/MIN/{1.73_M2}
GLUCOSE BLD-MCNC: 137 MG/DL (ref 70–99)
GLUCOSE BLD-MCNC: 169 MG/DL (ref 70–99)
GLUCOSE SERPL-MCNC: 147 MG/DL (ref 70–99)
HCT VFR BLD AUTO: 28.6 % (ref 40.5–52.5)
HGB BLD-MCNC: 9.3 G/DL (ref 13.5–17.5)
LYMPHOCYTES # BLD: 0.9 K/UL (ref 1–5.1)
LYMPHOCYTES NFR BLD: 12 %
MAGNESIUM SERPL-MCNC: 2.2 MG/DL (ref 1.8–2.4)
MCH RBC QN AUTO: 28.5 PG (ref 26–34)
MCHC RBC AUTO-ENTMCNC: 32.5 G/DL (ref 31–36)
MCV RBC AUTO: 87.6 FL (ref 80–100)
MONOCYTES # BLD: 0.7 K/UL (ref 0–1.3)
MONOCYTES NFR BLD: 9 %
NEUTROPHILS # BLD: 5.6 K/UL (ref 1.7–7.7)
NEUTROPHILS NFR BLD: 71.6 %
PERFORMED ON: ABNORMAL
PERFORMED ON: ABNORMAL
PHOSPHATE SERPL-MCNC: 6.1 MG/DL (ref 2.5–4.9)
PLATELET # BLD AUTO: 351 K/UL (ref 135–450)
PMV BLD AUTO: 8.3 FL (ref 5–10.5)
POTASSIUM SERPL-SCNC: 5.5 MMOL/L (ref 3.5–5.1)
RBC # BLD AUTO: 3.27 M/UL (ref 4.2–5.9)
SODIUM SERPL-SCNC: 128 MMOL/L (ref 136–145)
WBC # BLD AUTO: 7.9 K/UL (ref 4–11)

## 2023-11-06 PROCEDURE — 6370000000 HC RX 637 (ALT 250 FOR IP): Performed by: STUDENT IN AN ORGANIZED HEALTH CARE EDUCATION/TRAINING PROGRAM

## 2023-11-06 PROCEDURE — 6370000000 HC RX 637 (ALT 250 FOR IP): Performed by: SPECIALIST/TECHNOLOGIST

## 2023-11-06 PROCEDURE — 80069 RENAL FUNCTION PANEL: CPT

## 2023-11-06 PROCEDURE — 90935 HEMODIALYSIS ONE EVALUATION: CPT

## 2023-11-06 PROCEDURE — 85025 COMPLETE CBC W/AUTO DIFF WBC: CPT

## 2023-11-06 PROCEDURE — 94660 CPAP INITIATION&MGMT: CPT

## 2023-11-06 PROCEDURE — 94761 N-INVAS EAR/PLS OXIMETRY MLT: CPT

## 2023-11-06 PROCEDURE — 36415 COLL VENOUS BLD VENIPUNCTURE: CPT

## 2023-11-06 PROCEDURE — 2700000000 HC OXYGEN THERAPY PER DAY

## 2023-11-06 PROCEDURE — 83735 ASSAY OF MAGNESIUM: CPT

## 2023-11-06 PROCEDURE — 6360000002 HC RX W HCPCS: Performed by: INTERNAL MEDICINE

## 2023-11-06 RX ORDER — HEPARIN SODIUM 1000 [USP'U]/ML
INJECTION, SOLUTION INTRAVENOUS; SUBCUTANEOUS
Status: DISCONTINUED
Start: 2023-11-06 | End: 2023-11-06 | Stop reason: HOSPADM

## 2023-11-06 RX ADMIN — PANTOPRAZOLE SODIUM 40 MG: 40 TABLET, DELAYED RELEASE ORAL at 05:14

## 2023-11-06 RX ADMIN — ISOSORBIDE DINITRATE 20 MG: 20 TABLET ORAL at 15:36

## 2023-11-06 RX ADMIN — CLOPIDOGREL BISULFATE 75 MG: 75 TABLET, FILM COATED ORAL at 15:36

## 2023-11-06 RX ADMIN — METOPROLOL SUCCINATE 100 MG: 50 TABLET, EXTENDED RELEASE ORAL at 15:36

## 2023-11-06 RX ADMIN — HEPARIN SODIUM 3600 UNITS: 1000 INJECTION, SOLUTION INTRAVENOUS; SUBCUTANEOUS at 17:15

## 2023-11-06 RX ADMIN — DOXYCYCLINE HYCLATE 100 MG: 100 TABLET, COATED ORAL at 15:36

## 2023-11-06 RX ADMIN — PRAVASTATIN SODIUM 40 MG: 40 TABLET ORAL at 15:36

## 2023-11-06 RX ADMIN — OXYCODONE HYDROCHLORIDE 5 MG: 5 TABLET ORAL at 05:14

## 2023-11-06 RX ADMIN — DULOXETINE HYDROCHLORIDE 60 MG: 60 CAPSULE, DELAYED RELEASE ORAL at 15:36

## 2023-11-06 RX ADMIN — OXYCODONE HYDROCHLORIDE 5 MG: 5 TABLET ORAL at 15:35

## 2023-11-06 RX ADMIN — APIXABAN 5 MG: 5 TABLET, FILM COATED ORAL at 15:36

## 2023-11-06 ASSESSMENT — PAIN SCALES - GENERAL
PAINLEVEL_OUTOF10: 8
PAINLEVEL_OUTOF10: 9

## 2023-11-06 ASSESSMENT — PAIN - FUNCTIONAL ASSESSMENT: PAIN_FUNCTIONAL_ASSESSMENT: PREVENTS OR INTERFERES SOME ACTIVE ACTIVITIES AND ADLS

## 2023-11-06 ASSESSMENT — PAIN DESCRIPTION - LOCATION
LOCATION: BACK
LOCATION: BACK

## 2023-11-06 ASSESSMENT — PAIN DESCRIPTION - DESCRIPTORS: DESCRIPTORS: ACHING;DISCOMFORT

## 2023-11-06 NOTE — DIALYSIS
Treatment time: 3.5 Hours  Net UF: 2000 ML    Pre weight: 98.8 Kg  Post weight: 96.8 kg  EDW: 97.5 kg    Access used: LCW TDC   Access function: good with  ml/min    Medications or blood products given: None, heparin dwells to line    Regular outpatient schedule: [facility] [address]    Summary of response to treatment: Tolerated tx well with stable VS throughout. Copy of dialysis treatment record placed in chart, to be scanned into EMR.  Report given to floor NAVJOT Avila.

## 2023-11-06 NOTE — CARE COORDINATION
Aware of dc order. Call placed to [facility]/central intake on call to see if pt can admit today. Addendum: Have left x2 vm with Wenceslao  for [facility].  was reached by calling central intake number @ [phone].  asks CM to call central intake @ [phone] for admissions from hospital, indicating that Manuela Holland is on call for central admissions this w/e. No response. Attempted to call facility to see if supervisor aware of pt possible admission this w/e, no answer. CM will arrange dc tomorrow if cert still good.
CASE MANAGEMENT DISCHARGE SUMMARY      Discharge to: [facility] [address] completed: yes, good until today 11/06  Hospital Exemption Notification (HENS) completed: Yes, Document ID : [id number]    IMM given: 11/06/23    New Durable Medical Equipment ordered/agency: defer to SNF    Transportation:       Medical Transport explained to pt/family. Pt/family voice no agency preference. Agency used: [facility] [address] up time:1600   Ambulance form completed: Yes    Confirmed discharge plan with:     Patient: yes     Family:  yes per patient     Facility/Agency, name:        [facility] [address] [address]   [phone] CELINE/AVS faxed     Phone number for report to facility: [phone]      RN, name: Vicki Pierre     Note: Discharging nurse to complete CELINE, reconcile AVS, and place final copy with patient's discharge packet. RN to ensure that written prescriptions for  Level II medications are sent with patient to the facility as per protocol.
CMA scheduled transport with [facility] at 1000 to take patient to [address] [place] [address]. CM is aware.
Cert obtained through [facility]   CM is aware
DC plan for [address] [place] [address] obtained yesterday. Transport set up at [facility] if ready to DC. Needs Hep B results, ortho boot, and HENS. RN updated. CM following. Huseyin Barrera RN     [facility] called to say they have info needed and can accept today. Huseyin Barrera RN     [id number]     CASE MANAGEMENT DISCHARGE SUMMARY      Discharge to: [place] completed: yes  Hospital Exemption Notification (HENS) completed: yes    IMM given: (date) today    New Durable Medical Equipment ordered/agency: has ortho boot    Transportation:       Agency used:[facility] up time:1930   Ambulance form completed: Yes    Confirmed discharge plan with:     Patient: yes     Family:  yes spouse Contact number:in chart     Facility/Agency, name:  [facility] faxed   Phone number for report to facility: [phone]     RN, name: Stuart    Note: Discharging nurse to complete CELINE, reconcile AVS, and place final copy with patient's discharge packet. RN to ensure that written prescriptions for  Level II medications are sent with patient to the facility as per protocol.      Huseyin Barrera RN
Notified by RN that pt DC is cancelled- complaints of chest pain in HD. Calls placed to [address] to notify. Call placed to spouse and msg left. CM continues to follow.   Laure Claire RN
Received call from [facility] with [address]. She states she received referral and they are able to accept patient at discharge and able to accommodate a chair time for their onsite dialysis. She states they need updated Hep B panel. Ordered. She states she will initiate precert at this time. Patient is active with [facility]. He normally gets his dialysis at [facility]. 1308 addendum: Received call from [facility] with [address] and she states she got authorization for patient to discharge to them. Updated her that patient will likely be ready to discharge tomorrow after another day of diuresing and getting an ortho boot. She states they will be able to accept tomorrow pending the last Hep B test comes back by then. Updated patient on the above. He is still in agreement with this plan.
Therapy rec's of SNF noted. Spoke with patient at bedside and he is very much in agreement with this plan and would like a referral to be made to [facility] as he is aware that they have onsite dialysis. Placed call to [facility] with [facility] [place] intake. Message left with referral and with patient info. Received call from [employer] , Albino Frankel. She is aware that patient needed some assistance with resuming transportation to and from his dialysis at [facility]. She has worked this issue out and patient will now have 2 choices: 1) [employer] contracts with [facility] who will be able to transport and 2) [employer] will also be able to assist with transportation if needed as a back up. Informed her that patient will likely be discharging to [facility] for a skilled stay prior to returning home and resuming dialysis through them. She states that should not affect the transport arrangements she has made for him. Patient aware of the above. Received call from [facility] with Leon Velazquez who states she received call that patient is having problems with his cpap at home. She states that their communication shows that he is only compliant with his cpap 50% of the time. Patient denies. She confirmed that they will be able to follow up with him at discharge and is aware that he is planning to go to SNF prior to discharging to home. If [facility] is able to accept, patient will require a precert. Patient is active with [facility].
Case Management to discuss the discharge plan with any other family members/significant others, and if so, who? No  Plans to Return to Present Housing: Unknown at present  Other Identified Issues/Barriers to RETURNING to current housing: weakness, pain in foot  Potential Assistance needed at discharge: Home Care            Potential DME:    Patient expects to discharge to: Trailer/mobile home  Plan for transportation at discharge: (P) Family    Financial    Payor: [employer] MEDICARE / Plan: [facility] [address] / Product Type: *No Product type* /     Does insurance require precert for SNF: Yes    Potential assistance Purchasing Medications: No  Meds-to-Beds request:        [facility] Benoit [address] [facility] [place], [address] [phone] - F [phone]  [address] [place], [place] [address]  Phone: [phone] Fax: [phone]    [address] [place] [address] [address] [facility]  [address] [facility],Building [facility] [address]  Phone: [phone] Fax: [address] [facility], [address] [place] [address] [phone] - f [phone]  [address] [facility] [address]  Phone: [phone] Fax: [phone] [address] [place] [address] [phone] Unknown [facility] [address]  Phone: [phone] Fax: [phone]      Notes:    Factors facilitating achievement of predicted outcomes: Cooperative, Pleasant, and Has needed Durable Medical Equipment at home    Barriers to discharge: Pain and Medical complications    Additional Case Management Notes: Referred to patient for d/c planning. Spoke to patient. Patient well known from previous visits. Patient is a 52year old male admitted for ESRD. Patient lives at home with wife. [facility]. Patient states he is needing w/c transport to HD to due foot pain.   Patient reports transport was

## 2023-11-06 NOTE — DISCHARGE SUMMARY
PROVIDED HISTORY: Reason for exam:->Fall today. Reason for Exam: fall. right ankle and knee pain FINDINGS: There is soft tissue swelling lateral malleolus. There is a secondary center of ossification or old evulsion fracture of the medial malleolus. There is no evidence of acute fracture or joint effusion. Soft tissue swelling along the lateral malleolus. No acute fracture or joint noted. XR KNEE RIGHT (3 VIEWS)    Result Date: 10/23/2023  EXAMINATION: THREE XRAY VIEWS OF THE RIGHT KNEE 10/23/2023 11:14 am COMPARISON: None. HISTORY: ORDERING SYSTEM PROVIDED HISTORY: Fall today. TECHNOLOGIST PROVIDED HISTORY: Reason for exam:->Fall today. Reason for Exam: fall, right ankle and knee pain FINDINGS: No evidence of acute fracture or dislocation. No focal osseous lesion. No evidence of joint effusion. No focal soft tissue abnormality. No acute abnormality of the knee. XR CHEST PORTABLE    Result Date: 10/9/2023  EXAMINATION: ONE XRAY VIEW OF THE CHEST 10/9/2023 5:11 am COMPARISON: 09/27/2023 HISTORY: ORDERING SYSTEM PROVIDED HISTORY: SOB TECHNOLOGIST PROVIDED HISTORY: Reason for exam:->SOB FINDINGS: Left multi lumen catheter with the tip near the cavoatrial junction. Previous aortic valve replacement. Patient is rotated but there also is likely a shift of the heart to the left. Pulmonary vascular congestion is present. Summation density and the left basilar opacity. Had left basilar opacity on the previous exam as well. Left hemidiaphragm is obscured. No definite pneumothorax is seen. The right hemidiaphragm and costophrenic angle are still visualized without a significant right basilar opacity. Small air opacity or summation density at the periphery of the right mid chest.  No right-sided pneumothorax. Limited bony evaluation. Combination of summation density and left basilar opacity is again noted with obscuration of the diaphragm and portion of the cardiac border.   May relate to a
[facility]  10/31/2023 01:12 PM     No Growth to date. Any change in status will be called. Lab Results   Component Value Date/Time    BLOODCULT2 No Growth after 4 days of incubation.  09/27/2023 03:18 AM     Organism:   Lab Results   Component Value Date/Time    ORG Staph aureus MRSA 05/26/2023 01:23 PM       Time Spent Discharging patient 41 minutes    Electronically signed by Santiago Roman MD on 11/3/2023 at 12:57 PM

## 2023-11-06 NOTE — PLAN OF CARE
CHF Care Plan      Patient's EF (Ejection Fraction) is less than 40%    Heart Failure Medications:  Diuretics[de-identified] None    (One of the following REQUIRED for EF </= 40%/SYSTOLIC FAILURE but MAY be used in EF% >40%/DIASTOLIC FAILURE)        ACE[de-identified] None        ARB[de-identified] Losartan         ARNI[de-identified] None    (Beta Blockers)  NON- Evidenced Based Beta Blocker (for EF% >40%/DIASTOLIC FAILURE): None    Evidenced Based Beta Blocker::(REQUIRED for EF% <40%/SYSTOLIC FAILURE) Metoprolol SUCCinate- Toprol XL  . .................................................................................................................................................. Healthy Weight Tracking - BMI + Meds 11/2/2023 11/2/2023 11/2/2023 11/3/2023 11/4/2023 11/4/2023 11/4/2023   Weight 220 lb 14.4 oz 242 lb 1 oz 239 lb 13.8 oz 240 lb 1.3 oz (No Data) 226 lb 13.7 oz 219 lb 5.7 oz   Height - - - - - - -   Body Mass Index 32.62 kg/m2 35.75 kg/m2 35.42 kg/m2 35.45 kg/m2 - 33.5 kg/m2 32.39 kg/m2   Some recent data might be hidden         Patient's weights and intake/output reviewed: Yes    Daily Weight log at bedside, patient/family participation in use of log: no    Patient's Last Weight: Pt refused weight.        Intake/Output Summary (Last 24 hours) at 11/4/2023 1612  Last data filed at 11/4/2023 1502  Gross per 24 hour   Intake 795 ml   Output 3840 ml   Net -3045 ml       Education Booklet Provided: No    Comorbidities Reviewed Yes    Patient has a past medical history of Ambulatory dysfunction, Aortic stenosis, Arthritis, Asthma, Bilateral hilar adenopathy syndrome, CAD (coronary artery disease), Cardiomyopathy ([address]), CHF (congestive heart failure) ([address]), Chronic pain, COPD (chronic obstructive pulmonary disease) ([address]), Depression, Diabetes mellitus ([address]), Difficult intravenous access, Emphysema of lung ([address]), ESRD (end stage renal disease) on dialysis ([address]), Fear of needles, Gastric ulcer, GERD (gastroesophageal reflux disease), Heart valve problem,
CHF Care Plan      Patient's EF (Ejection Fraction) is less than 40%    Heart Failure Medications:  Diuretics[de-identified] None    (One of the following REQUIRED for EF </= 40%/SYSTOLIC FAILURE but MAY be used in EF% >40%/DIASTOLIC FAILURE)        ACE[de-identified] None        ARB[de-identified] Losartan         ARNI[de-identified] None    (Beta Blockers)  NON- Evidenced Based Beta Blocker (for EF% >40%/DIASTOLIC FAILURE): None    Evidenced Based Beta Blocker::(REQUIRED for EF% <40%/SYSTOLIC FAILURE) Metoprolol SUCCinate- Toprol XL  . .................................................................................................................................................. Healthy Weight Tracking - BMI + Meds 11/2/2023 11/2/2023 11/3/2023 11/4/2023 11/4/2023 11/4/2023 11/5/2023   Weight 242 lb 1 oz 239 lb 13.8 oz 240 lb 1.3 oz (No Data) 226 lb 13.7 oz 219 lb 5.7 oz 231 lb 7.7 oz   Height - - - - - - -   Body Mass Index 35.75 kg/m2 35.42 kg/m2 35.45 kg/m2 - 33.5 kg/m2 32.39 kg/m2 34.18 kg/m2   Some recent data might be hidden         Patient's weights and intake/output reviewed: Yes    Daily Weight log at bedside, patient/family participation in use of log: no    Patient's Last Weight: 231 lbs obtained by bed scale. Difference of 12 lbs more than last documented weight.       Intake/Output Summary (Last 24 hours) at 11/5/2023 0618  Last data filed at 11/5/2023 0319  Gross per 24 hour   Intake 880 ml   Output 3890 ml   Net -3010 ml       Education Booklet Provided: no    Comorbidities Reviewed Yes    Patient has a past medical history of Ambulatory dysfunction, Aortic stenosis, Arthritis, Asthma, Bilateral hilar adenopathy syndrome, CAD (coronary artery disease), Cardiomyopathy ([address]), CHF (congestive heart failure) ([address]), Chronic pain, COPD (chronic obstructive pulmonary disease) ([address]), Depression, Diabetes mellitus ([address]), Difficult intravenous access, Emphysema of lung ([address]), ESRD (end stage renal disease) on dialysis ([address]), Fear of needles, Gastric ulcer,
CHF Care Plan      Patient's EF (Ejection Fraction) is less than 40%    Heart Failure Medications:  Diuretics[de-identified] None    (One of the following REQUIRED for EF </= 40%/SYSTOLIC FAILURE but MAY be used in EF% >40%/DIASTOLIC FAILURE)        ACE[de-identified] None        ARB[de-identified] Losartan         ARNI[de-identified] None    (Beta Blockers)  NON- Evidenced Based Beta Blocker (for EF% >40%/DIASTOLIC FAILURE): Other    Evidenced Based Beta Blocker::(REQUIRED for EF% <40%/SYSTOLIC FAILURE) Metoprolol SUCCinate- Toprol XL  . .................................................................................................................................................. Healthy Weight Tracking - BMI + Meds 11/2/2023 11/2/2023 11/3/2023 11/4/2023 11/4/2023 11/4/2023 11/5/2023   Weight 242 lb 1 oz 239 lb 13.8 oz 240 lb 1.3 oz (No Data) 226 lb 13.7 oz 219 lb 5.7 oz 231 lb 7.7 oz   Height - - - - - - -   Body Mass Index 35.75 kg/m2 35.42 kg/m2 35.45 kg/m2 - 33.5 kg/m2 32.39 kg/m2 34.18 kg/m2   Some recent data might be hidden         Patient's weights and intake/output reviewed: Yes    Daily Weight log at bedside, patient/family participation in use of log: pt unable to participate\" (refuses)    Patient's Last Weight: 105 kg obtained by bed scale. Difference of 6 kg more than last documented weight.       Intake/Output Summary (Last 24 hours) at 11/5/2023 1048  Last data filed at 11/5/2023 1035  Gross per 24 hour   Intake 880 ml   Output 3940 ml   Net -3060 ml       Education Booklet Provided: no    Comorbidities Reviewed Yes    Patient has a past medical history of Ambulatory dysfunction, Aortic stenosis, Arthritis, Asthma, Bilateral hilar adenopathy syndrome, CAD (coronary artery disease), Cardiomyopathy ([address]), CHF (congestive heart failure) ([address]), Chronic pain, COPD (chronic obstructive pulmonary disease) ([address]), Depression, Diabetes mellitus ([address]), Difficult intravenous access, Emphysema of lung ([address]), ESRD (end stage renal disease) on dialysis ([address]),
CHF Care Plan      Patient's EF (Ejection Fraction) is less than 40%    Heart Failure Medications:  Diuretics[de-identified] None    (One of the following REQUIRED for EF </= 40%/SYSTOLIC FAILURE but MAY be used in EF% >40%/DIASTOLIC FAILURE)        ACE[de-identified] None        ARB[de-identified] Losartan         ARNI[de-identified] None    (Beta Blockers)  NON- Evidenced Based Beta Blocker (for EF% >40%/DIASTOLIC FAILURE): Other    Evidenced Based Beta Blocker::(REQUIRED for EF% <40%/SYSTOLIC FAILURE) Metoprolol SUCCinate- Toprol XL  . .................................................................................................................................................. Healthy Weight Tracking - BMI + Meds 11/2/2023 11/2/2023 11/3/2023 11/4/2023 11/4/2023 11/4/2023 11/5/2023   Weight 242 lb 1 oz 239 lb 13.8 oz 240 lb 1.3 oz (No Data) 226 lb 13.7 oz 219 lb 5.7 oz 231 lb 7.7 oz   Height - - - - - - -   Body Mass Index 35.75 kg/m2 35.42 kg/m2 35.45 kg/m2 - 33.5 kg/m2 32.39 kg/m2 34.18 kg/m2   Some recent data might be hidden         Patient's weights and intake/output reviewed: Yes    Daily Weight log at bedside, patient/family participation in use of log: pt unable to participate\" (refuses)    Patient's Last Weight: 231 lbs obtained by bed scale. Difference of 6 lbs more than last documented weight.       Intake/Output Summary (Last 24 hours) at 11/6/2023 4628  Last data filed at 11/6/2023 0544  Gross per 24 hour   Intake 720 ml   Output 70 ml   Net 650 ml       Education Booklet Provided: no    Comorbidities Reviewed Yes    Patient has a past medical history of Ambulatory dysfunction, Aortic stenosis, Arthritis, Asthma, Bilateral hilar adenopathy syndrome, CAD (coronary artery disease), Cardiomyopathy ([address]), CHF (congestive heart failure) ([address]), Chronic pain, COPD (chronic obstructive pulmonary disease) ([address]), Depression, Diabetes mellitus ([address]), Difficult intravenous access, Emphysema of lung ([address]), ESRD (end stage renal disease) on dialysis ([address]), Fear
CHF Care Plan      Patient's EF (Ejection Fraction) is less than 40%    Heart Failure Medications:  Diuretics[de-identified] None    (One of the following REQUIRED for EF </= 40%/SYSTOLIC FAILURE but MAY be used in EF% >40%/DIASTOLIC FAILURE)        ACE[de-identified] None        ARB[de-identified] None         ARNI[de-identified] None    (Beta Blockers)  NON- Evidenced Based Beta Blocker (for EF% >40%/DIASTOLIC FAILURE): Metoprolol TARTrate- Lopressor    Evidenced Based Beta Blocker::(REQUIRED for EF% <40%/SYSTOLIC FAILURE) Carvedilol- Coreg  . .................................................................................................................................................. Healthy Weight Tracking - BMI + Meds 10/25/2023 10/25/2023 10/25/2023 10/26/2023 10/31/2023 10/31/2023 11/1/2023   Weight 217 lb 12.8 oz 217 lb 13 oz 213 lb 6.5 oz 243 lb 9.7 oz 243 lb 243 lb -   Height - - - - - 5' 9\" 5' 9\"   Body Mass Index 32.16 kg/m2 32.17 kg/m2 31.51 kg/m2 35.97 kg/m2 - 35.88 kg/m2 -   Some recent data might be hidden         Patient's weights and intake/output reviewed: Yes    Daily Weight log at bedside, patient/family participation in use of log: \"yes    Patient's Last Weight: 243 lbs obtained by standing scale. Difference of 0 lbs     than last documented weight.       Intake/Output Summary (Last 24 hours) at 11/1/2023 1845  Last data filed at 11/1/2023 1349  Gross per 24 hour   Intake 560 ml   Output 100 ml   Net 460 ml       Education Booklet Provided: yes    Comorbidities Reviewed Yes    Patient has a past medical history of Ambulatory dysfunction, Aortic stenosis, Arthritis, Asthma, Bilateral hilar adenopathy syndrome, CAD (coronary artery disease), Cardiomyopathy ([address]), CHF (congestive heart failure) ([address]), Chronic pain, COPD (chronic obstructive pulmonary disease) ([address]), Depression, Diabetes mellitus ([address]), Difficult intravenous access, Emphysema of lung ([address]), ESRD (end stage renal disease) on dialysis ([address]), Fear of needles, Gastric ulcer, GERD
CHF Care Plan      Patient's EF (Ejection Fraction) is less than 40%    Heart Failure Medications:  Diuretics[de-identified] None    (One of the following REQUIRED for EF </= 40%/SYSTOLIC FAILURE but MAY be used in EF% >40%/DIASTOLIC FAILURE)        ACE[de-identified] None        ARB[de-identified] None         ARNI[de-identified] None    (Beta Blockers)  NON- Evidenced Based Beta Blocker (for EF% >40%/DIASTOLIC FAILURE): Metoprolol TARTrate- Lopressor    Evidenced Based Beta Blocker::(REQUIRED for EF% <40%/SYSTOLIC FAILURE) Carvedilol- Coreg  . .................................................................................................................................................. Healthy Weight Tracking - BMI + Meds 10/26/2023 10/31/2023 10/31/2023 11/1/2023 11/2/2023 11/2/2023 11/2/2023   Weight 243 lb 9.7 oz 243 lb 243 lb - 220 lb 14.4 oz 242 lb 1 oz 239 lb 13.8 oz   Height - - 5' 9\" 5' 9\" - - -   Body Mass Index 35.97 kg/m2 - 35.88 kg/m2 - 32.62 kg/m2 35.75 kg/m2 35.42 kg/m2   Some recent data might be hidden         Patient's weights and intake/output reviewed: Yes    Daily Weight log at bedside, patient/family participation in use of log: \"yes    Patient's Last Weight: 108.8 kg obtained by standing scale. Difference of 3 lbs less than last documented weight.       Intake/Output Summary (Last 24 hours) at 11/2/2023 1459  Last data filed at 11/2/2023 1206  Gross per 24 hour   Intake 480 ml   Output 0 ml   Net 480 ml       Education Booklet Provided: yes    Comorbidities Reviewed Yes    Patient has a past medical history of Ambulatory dysfunction, Aortic stenosis, Arthritis, Asthma, Bilateral hilar adenopathy syndrome, CAD (coronary artery disease), Cardiomyopathy ([address]), CHF (congestive heart failure) ([address]), Chronic pain, COPD (chronic obstructive pulmonary disease) ([address]), Depression, Diabetes mellitus ([address]), Difficult intravenous access, Emphysema of lung ([address]), ESRD (end stage renal disease) on dialysis ([address]), Fear of needles, Gastric ulcer, GERD
Care plans adequate for discharge
Increase patients ADLs/functional status to baseline.
PT eval and tx completed, see note for details. Increase function to baseline.
Problem: Discharge Planning  Goal: Discharge to home or other facility with appropriate resources  11/1/2023 1844 by Kaye Phan RN  Outcome: Progressing     Problem: Pain  Goal: Verbalizes/displays adequate comfort level or baseline comfort level  11/1/2023 1844 by Kaey Phan RN  Outcome: Progressing     Problem: Chronic Conditions and Co-morbidities  Goal: Patient's chronic conditions and co-morbidity symptoms are monitored and maintained or improved  Outcome: Progressing     Problem: Skin/Tissue Integrity  Goal: Absence of new skin breakdown  Description: 1. Monitor for areas of redness and/or skin breakdown  2. Assess vascular access sites hourly  3. Every 4-6 hours minimum:  Change oxygen saturation probe site  4. Every 4-6 hours:  If on nasal continuous positive airway pressure, respiratory therapy assess nares and determine need for appliance change or resting period.   11/1/2023 1844 by Kaye Phan RN  Outcome: Progressing     Problem: Respiratory - Adult  Goal: Achieves optimal ventilation and oxygenation  11/1/2023 1844 by Kaye Phan RN  Outcome: Progressing     Problem: Cardiovascular - Adult  Goal: Maintains optimal cardiac output and hemodynamic stability  11/1/2023 1844 by Kaye Phan RN  Outcome: Progressing     Problem: Cardiovascular - Adult  Goal: Absence of cardiac dysrhythmias or at baseline  11/1/2023 1844 by Kaye Phan RN  Outcome: Progressing
Problem: Discharge Planning  Goal: Discharge to home or other facility with appropriate resources  11/1/2023 2348 by Clair Smith RN  Outcome: Progressing     Problem: Pain  Goal: Verbalizes/displays adequate comfort level or baseline comfort level  11/1/2023 2348 by Clair Smith RN  Outcome: Progressing      Problem: Cardiovascular - Adult  Goal: Maintains optimal cardiac output and hemodynamic stability  11/1/2023 2348 by Clair Smith RN  Outcome: Progressing     Problem: Skin/Tissue Integrity - Adult  Goal: Incisions, wounds, or drain sites healing without S/S of infection  11/1/2023 2348 by Clair Smith RN  Outcome: Progressing     Problem: Chronic Conditions and Co-morbidities  Goal: Patient's chronic conditions and co-morbidity symptoms are monitored and maintained or improved  11/1/2023 2348 by Clair Smith RN  Outcome: Progressing    CHF Care Plan      Patient's EF (Ejection Fraction) is less than 40%    Heart Failure Medications:  Diuretics[de-identified] None    (One of the following REQUIRED for EF </= 40%/SYSTOLIC FAILURE but MAY be used in EF% >40%/DIASTOLIC FAILURE)        ACE[de-identified] None        ARB[de-identified] None         ARNI[de-identified] None    (Beta Blockers)  NON- Evidenced Based Beta Blocker (for EF% >40%/DIASTOLIC FAILURE): None    Evidenced Based Beta Blocker::(REQUIRED for EF% <40%/SYSTOLIC FAILURE) Metoprolol SUCCinate- Toprol XL  . ..................................................................................................................................................     Healthy Weight Tracking - BMI + Meds 10/25/2023 10/25/2023 10/25/2023 10/26/2023 10/31/2023 10/31/2023 11/1/2023   Weight 217 lb 12.8 oz 217 lb 13 oz 213 lb 6.5 oz 243 lb 9.7 oz 243 lb 243 lb -   Height - - - - - 5' 9\" 5' 9\"   Body Mass Index 32.16 kg/m2 32.17 kg/m2 31.51 kg/m2 35.97 kg/m2 - 35.88 kg/m2 -   Some recent data might be hidden         Patient's weights and intake/output reviewed: Yes    Daily Weight log at
Problem: Discharge Planning  Goal: Discharge to home or other facility with appropriate resources  Outcome: Progressing     Problem: Pain  Goal: Verbalizes/displays adequate comfort level or baseline comfort level  Outcome: Progressing     Problem: Chronic Conditions and Co-morbidities  Goal: Patient's chronic conditions and co-morbidity symptoms are monitored and maintained or improved  Outcome: Progressing     Problem: Skin/Tissue Integrity  Goal: Absence of new skin breakdown  Description: 1. Monitor for areas of redness and/or skin breakdown  2. Assess vascular access sites hourly  3. Every 4-6 hours minimum:  Change oxygen saturation probe site  4. Every 4-6 hours:  If on nasal continuous positive airway pressure, respiratory therapy assess nares and determine need for appliance change or resting period.   Outcome: Progressing     Problem: Respiratory - Adult  Goal: Achieves optimal ventilation and oxygenation  Outcome: Progressing     Problem: Cardiovascular - Adult  Goal: Maintains optimal cardiac output and hemodynamic stability  Outcome: Progressing
Problem: Discharge Planning  Goal: Discharge to home or other facility with appropriate resources  Outcome: Progressing     Problem: Pain  Goal: Verbalizes/displays adequate comfort level or baseline comfort level  Outcome: Progressing     Problem: Skin/Tissue Integrity  Goal: Absence of new skin breakdown  Description: 1. Monitor for areas of redness and/or skin breakdown  2. Assess vascular access sites hourly  3. Every 4-6 hours minimum:  Change oxygen saturation probe site  4. Every 4-6 hours:  If on nasal continuous positive airway pressure, respiratory therapy assess nares and determine need for appliance change or resting period.   Outcome: Progressing     Problem: Respiratory - Adult  Goal: Achieves optimal ventilation and oxygenation  Outcome: Progressing   Maintained on bipap throughout the night  Problem: Cardiovascular - Adult  Goal: Maintains optimal cardiac output and hemodynamic stability  Outcome: Progressing
Problem: Respiratory - Adult  Goal: Achieves optimal ventilation and oxygenation  Outcome: Progressing     Problem: Cardiovascular - Adult  Goal: Maintains optimal cardiac output and hemodynamic stability  Outcome: Progressing  Goal: Absence of cardiac dysrhythmias or at baseline  Outcome: Progressing     Problem: Skin/Tissue Integrity - Adult  Goal: Skin integrity remains intact  11/6/2023 0006 by Quita Earl LPN  Outcome: Progressing  11/5/2023 1453 by Vasu Nunes RN  Outcome: Adequate for Discharge  Flowsheets (Taken 11/5/2023 8002)  Skin Integrity Remains Intact: Monitor for areas of redness and/or skin breakdown  Goal: Incisions, wounds, or drain sites healing without S/S of infection  Outcome: Progressing     Problem: Musculoskeletal - Adult  Goal: Return mobility to safest level of function  Outcome: Progressing  Goal: Maintain proper alignment of affected body part  Outcome: Progressing     Problem: Metabolic/Fluid and Electrolytes - Adult  Goal: Glucose maintained within prescribed range  Outcome: Progressing
Problem: Respiratory - Adult  Goal: Achieves optimal ventilation and oxygenation  Outcome: Progressing     Problem: Cardiovascular - Adult  Goal: Maintains optimal cardiac output and hemodynamic stability  Outcome: Progressing  Goal: Absence of cardiac dysrhythmias or at baseline  Outcome: Progressing     Problem: Skin/Tissue Integrity - Adult  Goal: Skin integrity remains intact  Outcome: Progressing  Goal: Incisions, wounds, or drain sites healing without S/S of infection  Outcome: Progressing     Problem: Musculoskeletal - Adult  Goal: Return mobility to safest level of function  Outcome: Progressing  Goal: Maintain proper alignment of affected body part  Outcome: Progressing     Problem: Metabolic/Fluid and Electrolytes - Adult  Goal: Glucose maintained within prescribed range  Outcome: Progressing
Problem: Skin/Tissue Integrity - Adult  Goal: Skin integrity remains intact  11/5/2023 1453 by Quique Larry RN  Outcome: Adequate for Discharge
intravenous access, Emphysema of lung ([address]), ESRD (end stage renal disease) on dialysis ([address]), Fear of needles, Gastric ulcer, GERD (gastroesophageal reflux disease), Heart valve problem, Hemodialysis patient ([address]), History of spinal fracture, Hx of blood clots, Hyperlipidemia, Hypertension, MI (myocardial infarction) ([address]), Neuromuscular disorder ([address]), Numbness and tingling in left arm, Pneumonia, PONV (postoperative nausea and vomiting), Prolonged emergence from general anesthesia, Sleep apnea, Stroke ([address]), TIA (transient ischemic attack), and Unspecified diseases of blood and blood-forming organs. >>For CHF and Comorbidity documentation on Education Time and Topics, please see Education Tab    Progressive Mobility Assessment:  What is this patient's Current Level of Mobility?: Ambulatory- with Assistance  How was this patient Mobilized today?: Edge of Bed, Up to Chair, Bedside Commode,  Up to Toilet/Shower, Up in Room, Up in Faith, Unable to Mobilize, and Patient Refuses to Mobilize, ambulated 40 ft                 With Whom? Nurse                 Level of Difficulty/Assistance: 2x Assist     Pt resting in bed at this time on BiPAP. Pt denies shortness of breath. Pt with pitting lower extremity edema.      Patient and/or Family's stated Goal of Care this Admission: increase activity tolerance, better understand heart failure and disease management, be more comfortable, and reduce lower extremity edema prior to discharge        :

## 2023-12-02 NOTE — PROGRESS NOTES
[facility] EMERGENCY DEPT  EMERGENCY DEPARTMENT HISTORY AND PHYSICAL EXAM      Date: 12/2/2023  Patient Name: Kenyatta Villatoro  MRN: [id number]  [address] [place] 1959  Date of evaluation: 12/2/2023  Provider: Juan A Mota MD   Note Started: 2:00 PM EST 12/2/23    HISTORY OF PRESENT ILLNESS     Chief Complaint   Patient presents with    Loss of Consciousness    Emesis       History Provided By: Patient    HPI: Kenyatta Villatoro is a 75 y.o. female patient with a syncopal event while in [place] positive LOC preceded by nausea vomiting. PAST MEDICAL HISTORY   Past Medical History:  Past Medical History:   Diagnosis Date    Hx of Sjogren's disease ([address])        Past Surgical History:  Past Surgical History:   Procedure Laterality Date    APPENDECTOMY      CHOLECYSTECTOMY      HYSTERECTOMY (CERVIX STATUS UNKNOWN)      UPPER GASTROINTESTINAL ENDOSCOPY N/A 8/9/2023    EGD performed by Jeffrey Shah MD at [facility] ENDOSCOPY       Family History:  Family History   Problem Relation Age of Onset    Diabetes Mother     High Blood Pressure Mother     High Cholesterol Mother     Diabetes Father     High Blood Pressure Father     Heart Disease Father     High Cholesterol Father     Diabetes Sister     High Blood Pressure Sister     Heart Disease Sister     High Cholesterol Sister     Diabetes Brother     High Blood Pressure Brother     Heart Disease Brother     High Cholesterol Brother        Social History:  Social History     Tobacco Use    Smoking status: Never    Smokeless tobacco: Never   Substance Use Topics    Alcohol use: Never    Drug use: Never       Allergies:  No Known Allergies    PCP: Rima Elmore MD    Current Meds:   No current facility-administered medications for this encounter.      Current Outpatient Medications   Medication Sig Dispense Refill    hydroxychloroquine (PLAQUENIL) 200 MG tablet Take by mouth daily      famotidine (PEPCID) 20 MG tablet Take 1 tablet by mouth daily         Social Determinants of Health: Hospitalist Progress Note      PCP: Lynnette Rajput MD    Date of Admission: 1/12/2022    Chief Complaint: Shortness of breath    Hospital Course:     Subjective: Patient denies any chest pain, complains of shortness of breath no nausea vomiting no diarrhea. Medications:  Reviewed    Infusion Medications    dextrose      heparin (porcine) Stopped (01/14/22 1107)     Scheduled Medications    aspirin  81 mg Oral Daily    furosemide  40 mg IntraVENous BID    insulin lispro  0-6 Units SubCUTAneous 4x Daily AC & HS    aspirin  162 mg Oral Once    clopidogrel  75 mg Oral Daily    DULoxetine  60 mg Oral Daily    pantoprazole  40 mg Oral QAM AC    pravastatin  40 mg Oral Nightly    tiotropium-olodaterol  2 puff Inhalation Daily    famotidine  20 mg Oral Once per day on Mon Wed Fri    linaclotide  145 mcg Oral QAM AC    QUEtiapine  50 mg Oral Nightly    insulin glargine  25 Units SubCUTAneous Nightly    metoprolol succinate  50 mg Oral Daily    lisinopril  5 mg Oral Daily     PRN Meds: prochlorperazine, iopamidol, albuterol, heparin (porcine), heparin (porcine), albuterol sulfate HFA, traZODone, labetalol, glucose, dextrose, glucagon (rDNA), dextrose, heparin (porcine)      Intake/Output Summary (Last 24 hours) at 1/15/2022 0951  Last data filed at 1/14/2022 1842  Gross per 24 hour   Intake 490 ml   Output 2750 ml   Net -2260 ml       Physical Exam Performed:    /70   Pulse 78   Temp 98.6 °F (37 °C) (Oral)   Resp 18   Ht 5' 9\" (1.753 m)   Wt 264 lb 15.9 oz (120.2 kg)   SpO2 97%   BMI 39.13 kg/m²     General appearance: No apparent distress, appears stated age and cooperative. HEENT: Pupils equal, round, and reactive to light. Conjunctivae/corneas clear. Neck: Supple, with full range of motion. No jugular venous distention. Trachea midline. Respiratory:  Normal respiratory effort. Clear to auscultation, bilaterally without Rales/Wheezes/Rhonchi.   Cardiovascular: Regular rate and rhythm with normal S1/S2 without murmurs, rubs or gallops. Abdomen: Soft, non-tender, non-distended with normal bowel sounds. Musculoskeletal: No clubbing, cyanosis or edema bilaterally. Full range of motion without deformity. Skin: Skin color, texture, turgor normal.  No rashes or lesions. Multiple tattoos all over his body. Neurologic:  Neurovascularly intact without any focal sensory/motor deficits. Cranial nerves: II-XII intact, grossly non-focal.  Psychiatric: Alert and oriented, thought content appropriate, normal insight  Capillary Refill: Brisk,3 seconds, normal   Peripheral Pulses: +2 palpable, equal bilaterally       Labs:   Recent Labs     01/15/22  0519   WBC 8.3   HGB 8.6*   HCT 26.2*        Recent Labs     01/14/22  0511 01/15/22  0519   * 136   K 4.7 4.2   CL 94* 93*   CO2 21 23   BUN 68* 38*   CREATININE 7.2* 5.2*   CALCIUM 8.7 8.4     No results for input(s): AST, ALT, BILIDIR, BILITOT, ALKPHOS in the last 72 hours. No results for input(s): INR in the last 72 hours. Recent Labs     01/12/22  1200   TROPONINI 0.39*       Urinalysis:      Lab Results   Component Value Date    NITRU Negative 09/07/2021    WBCUA 10-20 09/07/2021    BACTERIA 1+ 09/07/2021    RBCUA 3-4 09/07/2021    BLOODU TRACE-LYSED 09/07/2021    SPECGRAV 1.015 09/07/2021    GLUCOSEU 100 09/07/2021       Radiology:  NM Cardiac Stress Test Nuclear Imaging   Final Result      XR CHEST PORTABLE   Final Result   Portable study is limited by body habitus. Lungs are grossly clear. Status post TAVR. Left IJ tunneled dialysis catheter is in good position. VL DUP UPPER EXTREMITY ARTERIES RIGHT    (Results Pending)           Assessment/Plan:    Active Hospital Problems    Diagnosis     NSTEMI (non-ST elevated myocardial infarction) ([facility] [address] [I21.4]     SOB (shortness of breath) on exertion [R06.02]      1.   This is a 66-year-old male with a history of coronary artery disease admitted with NSTEMI, stress test on 1/13/2022 with abnormal, cardiology consulted status post left heart catheterization on 1/14/2022 PATRICIA to RCA cardiology consulted and following. 2.  Cardiomyopathy continue with the current medication. 3.  History of a TAVR  4. Hypertension continue with the current medication. 5.  Diabetes mellitus type 2 on basal bolus and sliding scale hemoglobin A1c= 9.4 on 1/12/2022.  6.  End-stage renal disease nephrology consulted and following continue with the hemodialysis Monday Wednesday and Friday. 7.  Anemia of chronic kidney disease continue with the DAVON during hemodialysis. 8.  Obesity BMI 39.13  9. Obstructive sleep apnea on his BiPAP at night. 10.  Acute respiratory failure with hypoxia secondary to CHF/valvular heart disease continue with the diuresis per nephrology. Wean oxygen as tolerated continue with inhalers. 11.  COPD continue with inhalers. 12.  Right lower extremity wound continue with wound care. 13.  History of depression continue with Seroquel. DVT Prophylaxis: Heparin drip  Diet: ADULT DIET;  Regular  Code Status: Full Code    PT/OT Eval Status:     Sapphire Solano MD

## 2023-12-06 ENCOUNTER — CARE COORDINATION (OUTPATIENT)
Dept: CASE MANAGEMENT | Age: 53
End: 2023-12-06

## 2023-12-06 NOTE — CARE COORDINATION
Care Transitions Post-Acute Facility Discharge Call    2023    Patient: Patricia Joaquin Patient : 1970   MRN: [id number]  Reason for Admission: R ankle fx, fluid overload  Discharge Date: 23 RARS: Readmission Risk Score: 50.7         Discharge Facility:  [facility] [address] Course:   10-31 to  [facility] with a R ankle fx fluid overload - unable to go to HD d/t ankle fx. HFU made:  None noted    Sig Hx:   Cellulitis, COPD, DMII, PVD, gangrene, CKD5 with HD, CHF    DME:     Conversation:   Left HIPPA compliant message regarding the nature of the call and a request for a return call with my contact information      RAMO Comer, [phone]  [facility] Transition Nurse     Notified Bari Wilson about d/c    Follow up plan: Will re-attempt           Care Transitions Post Acute Facility Transition    Post Acute Facility: [facility] Date: 23  Post Acute Discharge Date: 23  Do you have all of your prescriptions and are they filled?: Yes   Post Acute Services: [address] [place], [place] [address]y you have support at home?: Partner/Spouse/SO      Care Transitions Interventions         No future appointments.       RAMO Comer, RN   [address] Transition Nurse  [phone]

## 2023-12-07 ENCOUNTER — HOSPITAL ENCOUNTER (INPATIENT)
Age: 53
LOS: 6 days | Discharge: HOME | DRG: 291 | End: 2023-12-14
Attending: STUDENT IN AN ORGANIZED HEALTH CARE EDUCATION/TRAINING PROGRAM | Admitting: PEDIATRICS
Payer: MEDICARE

## 2023-12-07 ENCOUNTER — CARE COORDINATION (OUTPATIENT)
Dept: CASE MANAGEMENT | Age: 53
End: 2023-12-07

## 2023-12-07 DIAGNOSIS — I50.9 ACUTE ON CHRONIC CONGESTIVE HEART FAILURE, UNSPECIFIED HEART FAILURE TYPE (HCC): ICD-10-CM

## 2023-12-07 DIAGNOSIS — E87.1 HYPONATREMIA: ICD-10-CM

## 2023-12-07 DIAGNOSIS — J18.9 PNEUMONIA DUE TO INFECTIOUS ORGANISM, UNSPECIFIED LATERALITY, UNSPECIFIED PART OF LUNG: ICD-10-CM

## 2023-12-07 DIAGNOSIS — R06.00 DYSPNEA, UNSPECIFIED TYPE: ICD-10-CM

## 2023-12-07 DIAGNOSIS — R09.02 HYPOXIA: Primary | ICD-10-CM

## 2023-12-07 LAB
BASE EXCESS BLDV CALC-SCNC: -4.9 MMOL/L (ref -3–3)
BASOPHILS # BLD: 0 K/UL (ref 0–0.2)
BASOPHILS NFR BLD: 0.4 %
CO2 BLDV-SCNC: 24 MMOL/L
COHGB MFR BLDV: 2.2 % (ref 0–1.5)
DEPRECATED RDW RBC AUTO: 16.9 % (ref 12.4–15.4)
EOSINOPHIL # BLD: 0.3 K/UL (ref 0–0.6)
EOSINOPHIL NFR BLD: 2.7 %
HCO3 BLDV-SCNC: 22.7 MMOL/L (ref 23–29)
HCT VFR BLD AUTO: 29.9 % (ref 40.5–52.5)
HGB BLD-MCNC: 9.8 G/DL (ref 13.5–17.5)
LYMPHOCYTES # BLD: 0.5 K/UL (ref 1–5.1)
LYMPHOCYTES NFR BLD: 5.2 %
MCH RBC QN AUTO: 28.4 PG (ref 26–34)
MCHC RBC AUTO-ENTMCNC: 32.6 G/DL (ref 31–36)
MCV RBC AUTO: 86.9 FL (ref 80–100)
METHGB MFR BLDV: 0.1 %
MONOCYTES # BLD: 0.5 K/UL (ref 0–1.3)
MONOCYTES NFR BLD: 5 %
NEUTROPHILS # BLD: 8.7 K/UL (ref 1.7–7.7)
NEUTROPHILS NFR BLD: 86.7 %
O2 THERAPY: ABNORMAL
PCO2 BLDV: 54.1 MMHG (ref 40–50)
PH BLDV: 7.24 [PH] (ref 7.35–7.45)
PLATELET # BLD AUTO: 218 K/UL (ref 135–450)
PMV BLD AUTO: 7.8 FL (ref 5–10.5)
PO2 BLDV: 32.1 MMHG (ref 25–40)
PROCALCITONIN SERPL IA-MCNC: 0.71 NG/ML (ref 0–0.15)
RBC # BLD AUTO: 3.44 M/UL (ref 4.2–5.9)
SAO2 % BLDV: 53 %
TROPONIN, HIGH SENSITIVITY: 121 NG/L (ref 0–22)
WBC # BLD AUTO: 10 K/UL (ref 4–11)

## 2023-12-07 PROCEDURE — 99285 EMERGENCY DEPT VISIT HI MDM: CPT

## 2023-12-07 PROCEDURE — 80053 COMPREHEN METABOLIC PANEL: CPT

## 2023-12-07 PROCEDURE — 82803 BLOOD GASES ANY COMBINATION: CPT

## 2023-12-07 PROCEDURE — 84484 ASSAY OF TROPONIN QUANT: CPT

## 2023-12-07 PROCEDURE — 96375 TX/PRO/DX INJ NEW DRUG ADDON: CPT

## 2023-12-07 PROCEDURE — 2700000000 HC OXYGEN THERAPY PER DAY

## 2023-12-07 PROCEDURE — 94660 CPAP INITIATION&MGMT: CPT

## 2023-12-07 PROCEDURE — 87040 BLOOD CULTURE FOR BACTERIA: CPT

## 2023-12-07 PROCEDURE — 84145 PROCALCITONIN (PCT): CPT

## 2023-12-07 PROCEDURE — 96365 THER/PROPH/DIAG IV INF INIT: CPT

## 2023-12-07 PROCEDURE — 85025 COMPLETE CBC W/AUTO DIFF WBC: CPT

## 2023-12-07 ASSESSMENT — PAIN - FUNCTIONAL ASSESSMENT: PAIN_FUNCTIONAL_ASSESSMENT: 0-10

## 2023-12-07 ASSESSMENT — PAIN SCALES - GENERAL: PAINLEVEL_OUTOF10: 8

## 2023-12-07 ASSESSMENT — PAIN DESCRIPTION - ORIENTATION: ORIENTATION: LOWER

## 2023-12-07 ASSESSMENT — PAIN DESCRIPTION - PAIN TYPE: TYPE: CHRONIC PAIN

## 2023-12-07 ASSESSMENT — PAIN DESCRIPTION - LOCATION: LOCATION: BACK

## 2023-12-07 NOTE — CARE COORDINATION
Care Transitions Post-Acute Facility Discharge Call    2023    Patient: Shawna Marino Patient : 1970   MRN: [id number]  Reason for Admission: R ankle fx and fluid overload from missed   Discharge Date: 23 RARS: Readmission Risk Score: 50.7       Left HIPPA compliant message regarding the nature of the call and a request for a return call with my contact information      RAMO Dutta, [phone]  [facility] Transition Nurse     Hand off to [address] [place] [address] Way Transition    Post Acute Facility: [facility] Date: 23  Post Acute Discharge Date: 23  Do you have all of your prescriptions and are they filled?: Yes         Do you have support at home?: Partner/Spouse/SO      Care Transitions Interventions         No future appointments.     RAMO Dutta, RN   [address] Transition Nurse  [phone]

## 2023-12-08 ENCOUNTER — APPOINTMENT (OUTPATIENT)
Dept: GENERAL RADIOLOGY | Age: 53
DRG: 291 | End: 2023-12-08
Payer: MEDICARE

## 2023-12-08 PROBLEM — E87.29 HIGH ANION GAP METABOLIC ACIDOSIS: Status: ACTIVE | Noted: 2023-12-08

## 2023-12-08 PROBLEM — J96.22 ACUTE ON CHRONIC RESPIRATORY FAILURE WITH HYPERCAPNIA (HCC): Status: ACTIVE | Noted: 2022-12-04

## 2023-12-08 LAB
ALBUMIN SERPL-MCNC: 4.1 G/DL (ref 3.4–5)
ALBUMIN/GLOB SERPL: 1.2 {RATIO} (ref 1.1–2.2)
ALP SERPL-CCNC: 174 U/L (ref 40–129)
ALT SERPL-CCNC: 13 U/L (ref 10–40)
ANION GAP SERPL CALCULATED.3IONS-SCNC: 24 MMOL/L (ref 3–16)
ANION GAP SERPL CALCULATED.3IONS-SCNC: 25 MMOL/L (ref 3–16)
AST SERPL-CCNC: 13 U/L (ref 15–37)
BILIRUB SERPL-MCNC: 0.3 MG/DL (ref 0–1)
BUN SERPL-MCNC: 63 MG/DL (ref 7–20)
BUN SERPL-MCNC: 66 MG/DL (ref 7–20)
CALCIUM SERPL-MCNC: 9.3 MG/DL (ref 8.3–10.6)
CALCIUM SERPL-MCNC: 9.6 MG/DL (ref 8.3–10.6)
CHLORIDE SERPL-SCNC: 82 MMOL/L (ref 99–110)
CHLORIDE SERPL-SCNC: 82 MMOL/L (ref 99–110)
CO2 SERPL-SCNC: 18 MMOL/L (ref 21–32)
CO2 SERPL-SCNC: 21 MMOL/L (ref 21–32)
CREAT SERPL-MCNC: 8.4 MG/DL (ref 0.9–1.3)
CREAT SERPL-MCNC: 8.6 MG/DL (ref 0.9–1.3)
EKG ATRIAL RATE: 101 BPM
EKG DIAGNOSIS: NORMAL
EKG P AXIS: 117 DEGREES
EKG P-R INTERVAL: 170 MS
EKG Q-T INTERVAL: 386 MS
EKG QRS DURATION: 104 MS
EKG QTC CALCULATION (BAZETT): 500 MS
EKG R AXIS: 59 DEGREES
EKG T AXIS: 63 DEGREES
EKG VENTRICULAR RATE: 101 BPM
GFR SERPLBLD CREATININE-BSD FMLA CKD-EPI: 7 ML/MIN/{1.73_M2}
GFR SERPLBLD CREATININE-BSD FMLA CKD-EPI: 7 ML/MIN/{1.73_M2}
GLUCOSE BLD-MCNC: 178 MG/DL (ref 70–99)
GLUCOSE SERPL-MCNC: 219 MG/DL (ref 70–99)
GLUCOSE SERPL-MCNC: 336 MG/DL (ref 70–99)
IRON SATN MFR SERPL: 9 % (ref 20–50)
IRON SERPL-MCNC: 20 UG/DL (ref 59–158)
LACTATE BLDV-SCNC: 2.2 MMOL/L (ref 0.4–1.9)
LACTATE BLDV-SCNC: 3.1 MMOL/L (ref 0.4–1.9)
PERFORMED ON: ABNORMAL
POTASSIUM SERPL-SCNC: 4.5 MMOL/L (ref 3.5–5.1)
POTASSIUM SERPL-SCNC: 4.9 MMOL/L (ref 3.5–5.1)
PROT SERPL-MCNC: 7.6 G/DL (ref 6.4–8.2)
SODIUM SERPL-SCNC: 125 MMOL/L (ref 136–145)
SODIUM SERPL-SCNC: 127 MMOL/L (ref 136–145)
TIBC SERPL-MCNC: 224 UG/DL (ref 260–445)

## 2023-12-08 PROCEDURE — 94761 N-INVAS EAR/PLS OXIMETRY MLT: CPT

## 2023-12-08 PROCEDURE — 94640 AIRWAY INHALATION TREATMENT: CPT

## 2023-12-08 PROCEDURE — 2060000000 HC ICU INTERMEDIATE R&B

## 2023-12-08 PROCEDURE — 6370000000 HC RX 637 (ALT 250 FOR IP): Performed by: INTERNAL MEDICINE

## 2023-12-08 PROCEDURE — 6370000000 HC RX 637 (ALT 250 FOR IP): Performed by: NURSE PRACTITIONER

## 2023-12-08 PROCEDURE — 71045 X-RAY EXAM CHEST 1 VIEW: CPT

## 2023-12-08 PROCEDURE — 6370000000 HC RX 637 (ALT 250 FOR IP): Performed by: STUDENT IN AN ORGANIZED HEALTH CARE EDUCATION/TRAINING PROGRAM

## 2023-12-08 PROCEDURE — 90935 HEMODIALYSIS ONE EVALUATION: CPT

## 2023-12-08 PROCEDURE — 2580000003 HC RX 258: Performed by: PEDIATRICS

## 2023-12-08 PROCEDURE — 6370000000 HC RX 637 (ALT 250 FOR IP): Performed by: PEDIATRICS

## 2023-12-08 PROCEDURE — 83605 ASSAY OF LACTIC ACID: CPT

## 2023-12-08 PROCEDURE — 87040 BLOOD CULTURE FOR BACTERIA: CPT

## 2023-12-08 PROCEDURE — 5A1D70Z PERFORMANCE OF URINARY FILTRATION, INTERMITTENT, LESS THAN 6 HOURS PER DAY: ICD-10-PCS | Performed by: PEDIATRICS

## 2023-12-08 PROCEDURE — 83550 IRON BINDING TEST: CPT

## 2023-12-08 PROCEDURE — 6360000002 HC RX W HCPCS: Performed by: PEDIATRICS

## 2023-12-08 PROCEDURE — 2700000000 HC OXYGEN THERAPY PER DAY

## 2023-12-08 PROCEDURE — 80048 BASIC METABOLIC PNL TOTAL CA: CPT

## 2023-12-08 PROCEDURE — 36415 COLL VENOUS BLD VENIPUNCTURE: CPT

## 2023-12-08 PROCEDURE — 6360000002 HC RX W HCPCS: Performed by: INTERNAL MEDICINE

## 2023-12-08 PROCEDURE — 99223 1ST HOSP IP/OBS HIGH 75: CPT | Performed by: INTERNAL MEDICINE

## 2023-12-08 PROCEDURE — 2580000003 HC RX 258: Performed by: STUDENT IN AN ORGANIZED HEALTH CARE EDUCATION/TRAINING PROGRAM

## 2023-12-08 PROCEDURE — 83540 ASSAY OF IRON: CPT

## 2023-12-08 PROCEDURE — 94660 CPAP INITIATION&MGMT: CPT

## 2023-12-08 PROCEDURE — 51798 US URINE CAPACITY MEASURE: CPT

## 2023-12-08 PROCEDURE — 6360000002 HC RX W HCPCS: Performed by: STUDENT IN AN ORGANIZED HEALTH CARE EDUCATION/TRAINING PROGRAM

## 2023-12-08 RX ORDER — MIDODRINE HYDROCHLORIDE 5 MG/1
10 TABLET ORAL 3 TIMES DAILY PRN
COMMUNITY

## 2023-12-08 RX ORDER — PRAVASTATIN SODIUM 40 MG
40 TABLET ORAL DAILY
Status: DISCONTINUED | OUTPATIENT
Start: 2023-12-08 | End: 2023-12-14 | Stop reason: HOSPADM

## 2023-12-08 RX ORDER — INSULIN LISPRO 100 [IU]/ML
0-8 INJECTION, SOLUTION INTRAVENOUS; SUBCUTANEOUS
Status: DISCONTINUED | OUTPATIENT
Start: 2023-12-08 | End: 2023-12-14 | Stop reason: HOSPADM

## 2023-12-08 RX ORDER — SODIUM CHLORIDE 0.9 % (FLUSH) 0.9 %
5-40 SYRINGE (ML) INJECTION PRN
Status: DISCONTINUED | OUTPATIENT
Start: 2023-12-08 | End: 2023-12-14 | Stop reason: HOSPADM

## 2023-12-08 RX ORDER — GUAIFENESIN/DEXTROMETHORPHAN 100-10MG/5
5 SYRUP ORAL EVERY 4 HOURS PRN
Status: DISCONTINUED | OUTPATIENT
Start: 2023-12-08 | End: 2023-12-14 | Stop reason: HOSPADM

## 2023-12-08 RX ORDER — SODIUM CHLORIDE 0.9 % (FLUSH) 0.9 %
5-40 SYRINGE (ML) INJECTION EVERY 12 HOURS SCHEDULED
Status: DISCONTINUED | OUTPATIENT
Start: 2023-12-08 | End: 2023-12-14 | Stop reason: HOSPADM

## 2023-12-08 RX ORDER — SODIUM CHLORIDE 9 MG/ML
INJECTION, SOLUTION INTRAVENOUS PRN
Status: DISCONTINUED | OUTPATIENT
Start: 2023-12-08 | End: 2023-12-14 | Stop reason: HOSPADM

## 2023-12-08 RX ORDER — METOPROLOL SUCCINATE 50 MG/1
100 TABLET, EXTENDED RELEASE ORAL 2 TIMES DAILY
Status: DISCONTINUED | OUTPATIENT
Start: 2023-12-08 | End: 2023-12-14 | Stop reason: HOSPADM

## 2023-12-08 RX ORDER — POTASSIUM CHLORIDE 7.45 MG/ML
10 INJECTION INTRAVENOUS PRN
Status: DISCONTINUED | OUTPATIENT
Start: 2023-12-08 | End: 2023-12-08

## 2023-12-08 RX ORDER — MAGNESIUM SULFATE IN WATER 40 MG/ML
2000 INJECTION, SOLUTION INTRAVENOUS PRN
Status: DISCONTINUED | OUTPATIENT
Start: 2023-12-08 | End: 2023-12-08

## 2023-12-08 RX ORDER — DEXTROSE MONOHYDRATE 100 MG/ML
INJECTION, SOLUTION INTRAVENOUS CONTINUOUS PRN
Status: DISCONTINUED | OUTPATIENT
Start: 2023-12-08 | End: 2023-12-14 | Stop reason: HOSPADM

## 2023-12-08 RX ORDER — ENOXAPARIN SODIUM 100 MG/ML
40 INJECTION SUBCUTANEOUS DAILY
Status: DISCONTINUED | OUTPATIENT
Start: 2023-12-08 | End: 2023-12-08 | Stop reason: DRUGHIGH

## 2023-12-08 RX ORDER — HEPARIN SODIUM 5000 [USP'U]/ML
5000 INJECTION, SOLUTION INTRAVENOUS; SUBCUTANEOUS EVERY 8 HOURS SCHEDULED
Status: DISCONTINUED | OUTPATIENT
Start: 2023-12-08 | End: 2023-12-14 | Stop reason: HOSPADM

## 2023-12-08 RX ORDER — ISOSORBIDE DINITRATE 20 MG/1
20 TABLET ORAL 3 TIMES DAILY
Status: DISCONTINUED | OUTPATIENT
Start: 2023-12-08 | End: 2023-12-14 | Stop reason: HOSPADM

## 2023-12-08 RX ORDER — HEPARIN SODIUM 1000 [USP'U]/ML
3600 INJECTION, SOLUTION INTRAVENOUS; SUBCUTANEOUS PRN
Status: DISCONTINUED | OUTPATIENT
Start: 2023-12-08 | End: 2023-12-14 | Stop reason: HOSPADM

## 2023-12-08 RX ORDER — POTASSIUM CHLORIDE 20 MEQ/1
40 TABLET, EXTENDED RELEASE ORAL PRN
Status: DISCONTINUED | OUTPATIENT
Start: 2023-12-08 | End: 2023-12-08

## 2023-12-08 RX ORDER — CLOPIDOGREL BISULFATE 75 MG/1
75 TABLET ORAL DAILY
Status: DISCONTINUED | OUTPATIENT
Start: 2023-12-08 | End: 2023-12-14 | Stop reason: HOSPADM

## 2023-12-08 RX ORDER — LABETALOL HYDROCHLORIDE 5 MG/ML
10 INJECTION, SOLUTION INTRAVENOUS EVERY 6 HOURS PRN
Status: DISCONTINUED | OUTPATIENT
Start: 2023-12-08 | End: 2023-12-14 | Stop reason: HOSPADM

## 2023-12-08 RX ORDER — PANTOPRAZOLE SODIUM 40 MG/1
40 TABLET, DELAYED RELEASE ORAL
Status: DISCONTINUED | OUTPATIENT
Start: 2023-12-09 | End: 2023-12-14 | Stop reason: HOSPADM

## 2023-12-08 RX ORDER — INSULIN GLARGINE 100 [IU]/ML
15 INJECTION, SOLUTION SUBCUTANEOUS 2 TIMES DAILY
Status: DISCONTINUED | OUTPATIENT
Start: 2023-12-08 | End: 2023-12-09

## 2023-12-08 RX ORDER — METHYLPREDNISOLONE SODIUM SUCCINATE 125 MG/2ML
60 INJECTION, POWDER, LYOPHILIZED, FOR SOLUTION INTRAMUSCULAR; INTRAVENOUS EVERY 12 HOURS
Status: DISCONTINUED | OUTPATIENT
Start: 2023-12-08 | End: 2023-12-08

## 2023-12-08 RX ORDER — TRAZODONE HYDROCHLORIDE 50 MG/1
50 TABLET ORAL DAILY
Status: DISCONTINUED | OUTPATIENT
Start: 2023-12-08 | End: 2023-12-14 | Stop reason: HOSPADM

## 2023-12-08 RX ORDER — IPRATROPIUM BROMIDE AND ALBUTEROL SULFATE 2.5; .5 MG/3ML; MG/3ML
1 SOLUTION RESPIRATORY (INHALATION)
Status: DISCONTINUED | OUTPATIENT
Start: 2023-12-08 | End: 2023-12-08

## 2023-12-08 RX ORDER — DULOXETIN HYDROCHLORIDE 60 MG/1
60 CAPSULE, DELAYED RELEASE ORAL DAILY
Status: DISCONTINUED | OUTPATIENT
Start: 2023-12-08 | End: 2023-12-14 | Stop reason: HOSPADM

## 2023-12-08 RX ORDER — POLYETHYLENE GLYCOL 3350 17 G/17G
17 POWDER, FOR SOLUTION ORAL DAILY PRN
Status: DISCONTINUED | OUTPATIENT
Start: 2023-12-08 | End: 2023-12-14 | Stop reason: HOSPADM

## 2023-12-08 RX ORDER — QUETIAPINE FUMARATE 25 MG/1
50 TABLET, FILM COATED ORAL NIGHTLY
Status: DISCONTINUED | OUTPATIENT
Start: 2023-12-08 | End: 2023-12-14 | Stop reason: HOSPADM

## 2023-12-08 RX ORDER — ONDANSETRON 4 MG/1
4 TABLET, ORALLY DISINTEGRATING ORAL EVERY 8 HOURS PRN
Status: DISCONTINUED | OUTPATIENT
Start: 2023-12-08 | End: 2023-12-14 | Stop reason: HOSPADM

## 2023-12-08 RX ORDER — ACETAMINOPHEN 650 MG/1
650 SUPPOSITORY RECTAL EVERY 6 HOURS PRN
Status: DISCONTINUED | OUTPATIENT
Start: 2023-12-08 | End: 2023-12-14 | Stop reason: HOSPADM

## 2023-12-08 RX ORDER — ACETAMINOPHEN 325 MG/1
650 TABLET ORAL EVERY 6 HOURS PRN
Status: DISCONTINUED | OUTPATIENT
Start: 2023-12-08 | End: 2023-12-14 | Stop reason: HOSPADM

## 2023-12-08 RX ORDER — INSULIN LISPRO 100 [IU]/ML
0-4 INJECTION, SOLUTION INTRAVENOUS; SUBCUTANEOUS NIGHTLY
Status: DISCONTINUED | OUTPATIENT
Start: 2023-12-08 | End: 2023-12-14 | Stop reason: HOSPADM

## 2023-12-08 RX ORDER — ONDANSETRON 2 MG/ML
4 INJECTION INTRAMUSCULAR; INTRAVENOUS EVERY 6 HOURS PRN
Status: DISCONTINUED | OUTPATIENT
Start: 2023-12-08 | End: 2023-12-14 | Stop reason: HOSPADM

## 2023-12-08 RX ORDER — IPRATROPIUM BROMIDE AND ALBUTEROL SULFATE 2.5; .5 MG/3ML; MG/3ML
1 SOLUTION RESPIRATORY (INHALATION) EVERY 4 HOURS PRN
Status: DISCONTINUED | OUTPATIENT
Start: 2023-12-08 | End: 2023-12-14 | Stop reason: HOSPADM

## 2023-12-08 RX ORDER — SODIUM CHLORIDE, SODIUM LACTATE, POTASSIUM CHLORIDE, AND CALCIUM CHLORIDE .6; .31; .03; .02 G/100ML; G/100ML; G/100ML; G/100ML
1000 INJECTION, SOLUTION INTRAVENOUS ONCE
Status: COMPLETED | OUTPATIENT
Start: 2023-12-08 | End: 2023-12-08

## 2023-12-08 RX ORDER — TRAZODONE HYDROCHLORIDE 50 MG/1
50 TABLET ORAL NIGHTLY PRN
Status: DISCONTINUED | OUTPATIENT
Start: 2023-12-08 | End: 2023-12-14 | Stop reason: HOSPADM

## 2023-12-08 RX ADMIN — GUAIFENESIN AND DEXTROMETHORPHAN 5 ML: 100; 10 SYRUP ORAL at 19:56

## 2023-12-08 RX ADMIN — ACETAMINOPHEN 650 MG: 325 TABLET ORAL at 12:02

## 2023-12-08 RX ADMIN — PRAVASTATIN SODIUM 40 MG: 40 TABLET ORAL at 15:47

## 2023-12-08 RX ADMIN — HEPARIN SODIUM 5000 UNITS: 5000 INJECTION INTRAVENOUS; SUBCUTANEOUS at 13:11

## 2023-12-08 RX ADMIN — CLOPIDOGREL BISULFATE 75 MG: 75 TABLET ORAL at 15:47

## 2023-12-08 RX ADMIN — LABETALOL HYDROCHLORIDE 10 MG: 5 INJECTION INTRAVENOUS at 11:40

## 2023-12-08 RX ADMIN — ISOSORBIDE DINITRATE 20 MG: 20 TABLET ORAL at 20:01

## 2023-12-08 RX ADMIN — HEPARIN SODIUM 5000 UNITS: 5000 INJECTION INTRAVENOUS; SUBCUTANEOUS at 05:22

## 2023-12-08 RX ADMIN — PIPERACILLIN AND TAZOBACTAM 3375 MG: 3; .375 INJECTION, POWDER, LYOPHILIZED, FOR SOLUTION INTRAVENOUS at 00:54

## 2023-12-08 RX ADMIN — VANCOMYCIN HYDROCHLORIDE 1000 MG: 1 INJECTION, POWDER, LYOPHILIZED, FOR SOLUTION INTRAVENOUS at 01:19

## 2023-12-08 RX ADMIN — METHYLPREDNISOLONE SODIUM SUCCINATE 60 MG: 125 INJECTION INTRAMUSCULAR; INTRAVENOUS at 16:38

## 2023-12-08 RX ADMIN — TRAZODONE HYDROCHLORIDE 50 MG: 50 TABLET ORAL at 20:58

## 2023-12-08 RX ADMIN — LABETALOL HYDROCHLORIDE 10 MG: 5 INJECTION INTRAVENOUS at 05:16

## 2023-12-08 RX ADMIN — IPRATROPIUM BROMIDE AND ALBUTEROL SULFATE 1 DOSE: 2.5; .5 SOLUTION RESPIRATORY (INHALATION) at 05:55

## 2023-12-08 RX ADMIN — METOPROLOL SUCCINATE 100 MG: 50 TABLET, EXTENDED RELEASE ORAL at 15:47

## 2023-12-08 RX ADMIN — QUETIAPINE FUMARATE 50 MG: 25 TABLET ORAL at 20:01

## 2023-12-08 RX ADMIN — HEPARIN SODIUM 5000 UNITS: 5000 INJECTION INTRAVENOUS; SUBCUTANEOUS at 20:01

## 2023-12-08 RX ADMIN — HEPARIN SODIUM 3600 UNITS: 1000 INJECTION INTRAVENOUS; SUBCUTANEOUS at 11:47

## 2023-12-08 RX ADMIN — SODIUM CHLORIDE, PRESERVATIVE FREE 10 ML: 5 INJECTION INTRAVENOUS at 21:00

## 2023-12-08 RX ADMIN — SODIUM CHLORIDE, POTASSIUM CHLORIDE, SODIUM LACTATE AND CALCIUM CHLORIDE 1000 ML: 600; 310; 30; 20 INJECTION, SOLUTION INTRAVENOUS at 01:15

## 2023-12-08 RX ADMIN — SODIUM CHLORIDE, PRESERVATIVE FREE 10 ML: 5 INJECTION INTRAVENOUS at 09:06

## 2023-12-08 RX ADMIN — TRAZODONE HYDROCHLORIDE 50 MG: 50 TABLET ORAL at 18:04

## 2023-12-08 RX ADMIN — ISOSORBIDE DINITRATE 20 MG: 20 TABLET ORAL at 15:47

## 2023-12-08 RX ADMIN — INSULIN GLARGINE 15 UNITS: 100 INJECTION, SOLUTION SUBCUTANEOUS at 20:02

## 2023-12-08 RX ADMIN — LABETALOL HYDROCHLORIDE 10 MG: 5 INJECTION INTRAVENOUS at 17:58

## 2023-12-08 RX ADMIN — METHYLPREDNISOLONE SODIUM SUCCINATE 60 MG: 125 INJECTION INTRAMUSCULAR; INTRAVENOUS at 05:21

## 2023-12-08 RX ADMIN — DULOXETINE HYDROCHLORIDE 60 MG: 60 CAPSULE, DELAYED RELEASE ORAL at 15:47

## 2023-12-08 ASSESSMENT — PAIN SCALES - GENERAL
PAINLEVEL_OUTOF10: 9
PAINLEVEL_OUTOF10: 7

## 2023-12-08 ASSESSMENT — PAIN DESCRIPTION - LOCATION: LOCATION: BACK

## 2023-12-08 NOTE — ACP (ADVANCE CARE PLANNING)
Advance Care Planning     General Advance Care Planning (ACP) Conversation    Date of Conversation: 12/7/2023  Conducted with: Patient with Decision Making Capacity    Healthcare Decision Maker:    Primary Decision Maker: [address] - [phone]    Secondary Decision Maker: Edinson Hodges - Brother/Sister - [phone]  Click here to complete [address] including selection of the Healthcare Decision Maker Relationship (ie \"Primary\"). Today we documented Decision Maker(s) consistent with ACP documents on file. Content/Action Overview:   Has ACP document(s) on file - reflects the patient's care preferences  Reviewed DNR/DNI and patient elects Full Code (Attempt Resuscitation)        Length of Voluntary ACP Conversation in minutes:  <16 minutes (Non-Billable)    Lui Cruz RN

## 2023-12-08 NOTE — H&P
Hemoglobin A1C:   Lab Results   Component Value Date/Time    LABA1C 7.7 10/09/2023 04:52 AM     TSH:   Lab Results   Component Value Date/Time    TSH 2.15 03/17/2022 08:33 PM     Troponin: No results found for: \"TROPONINT\"  Lactic Acid: No results for input(s): \"LACTA\" in the last 72 hours. BNP: No results for input(s): \"PROBNP\" in the last 72 hours. UA:  Lab Results   Component Value Date/Time    NITRU Negative 05/01/2023 11:28 AM    COLORU Yellow 05/01/2023 11:28 AM    PHUR 8.0 05/01/2023 11:28 AM    LABCAST 3-5 Hyaline 03/10/2020 01:58 PM    WBCUA 3-5 05/01/2023 11:28 AM    RBCUA None seen 05/01/2023 11:28 AM    MUCUS Rare 03/18/2022 09:15 PM    BACTERIA Rare 12/25/2022 05:00 AM    CLARITYU Clear 05/01/2023 11:28 AM    SPECGRAV 1.015 05/01/2023 11:28 AM    LEUKOCYTESUR Negative 05/01/2023 11:28 AM    UROBILINOGEN 0.2 05/01/2023 11:28 AM    BILIRUBINUR Negative 05/01/2023 11:28 AM    BLOODU SMALL 05/01/2023 11:28 AM    GLUCOSEU 500 05/01/2023 11:28 AM    KETUA Negative 05/01/2023 11:28 AM    AMORPHOUS 3+ 03/10/2020 01:58 PM     Urine Cultures:   Lab Results   Component Value Date/Time    LABURIN No growth at 18 to 36 hours 05/29/2022 12:51 PM     Blood Cultures:   Lab Results   Component Value Date/Time    BC No Growth after 4 days of incubation. 10/31/2023 01:12 PM     Lab Results   Component Value Date/Time    BLOODCULT2 No Growth after 4 days of incubation. 09/27/2023 03:18 AM     Organism:   Lab Results   Component Value Date/Time    ORG Staph aureus MRSA 05/26/2023 01:23 PM       Imaging/Diagnostics Last 24 Hours   XR CHEST PORTABLE    Result Date: 12/8/2023  EXAMINATION: ONE XRAY VIEW OF THE CHEST 12/8/2023 12:28 am COMPARISON: 10/31/2023 HISTORY: ORDERING SYSTEM PROVIDED HISTORY: shortness of breath TECHNOLOGIST PROVIDED HISTORY: Reason for exam:->shortness of breath Reason for Exam: sob FINDINGS: Tunneled dialysis catheter. Aortic valve repair.   Cardiac size and mediastinal structures appear

## 2023-12-09 LAB
ANION GAP SERPL CALCULATED.3IONS-SCNC: 20 MMOL/L (ref 3–16)
BASOPHILS # BLD: 0 K/UL (ref 0–0.2)
BASOPHILS NFR BLD: 0.1 %
BUN SERPL-MCNC: 62 MG/DL (ref 7–20)
CALCIUM SERPL-MCNC: 9 MG/DL (ref 8.3–10.6)
CHLORIDE SERPL-SCNC: 86 MMOL/L (ref 99–110)
CO2 SERPL-SCNC: 20 MMOL/L (ref 21–32)
CREAT SERPL-MCNC: 7.1 MG/DL (ref 0.9–1.3)
DEPRECATED RDW RBC AUTO: 16.9 % (ref 12.4–15.4)
EOSINOPHIL # BLD: 0 K/UL (ref 0–0.6)
EOSINOPHIL NFR BLD: 0 %
GFR SERPLBLD CREATININE-BSD FMLA CKD-EPI: 8 ML/MIN/{1.73_M2}
GLUCOSE BLD-MCNC: 117 MG/DL (ref 70–99)
GLUCOSE BLD-MCNC: 134 MG/DL (ref 70–99)
GLUCOSE BLD-MCNC: 181 MG/DL (ref 70–99)
GLUCOSE SERPL-MCNC: 165 MG/DL (ref 70–99)
HCT VFR BLD AUTO: 23.9 % (ref 40.5–52.5)
HGB BLD-MCNC: 8.1 G/DL (ref 13.5–17.5)
LYMPHOCYTES # BLD: 0.3 K/UL (ref 1–5.1)
LYMPHOCYTES NFR BLD: 5.2 %
MAGNESIUM SERPL-MCNC: 2 MG/DL (ref 1.8–2.4)
MCH RBC QN AUTO: 29.1 PG (ref 26–34)
MCHC RBC AUTO-ENTMCNC: 34 G/DL (ref 31–36)
MCV RBC AUTO: 85.5 FL (ref 80–100)
MONOCYTES # BLD: 0.4 K/UL (ref 0–1.3)
MONOCYTES NFR BLD: 5.8 %
NEUTROPHILS # BLD: 5.4 K/UL (ref 1.7–7.7)
NEUTROPHILS NFR BLD: 88.9 %
PERFORMED ON: ABNORMAL
PLATELET # BLD AUTO: 216 K/UL (ref 135–450)
PMV BLD AUTO: 7.6 FL (ref 5–10.5)
POTASSIUM SERPL-SCNC: 4.9 MMOL/L (ref 3.5–5.1)
POTASSIUM SERPL-SCNC: 4.9 MMOL/L (ref 3.5–5.1)
RBC # BLD AUTO: 2.8 M/UL (ref 4.2–5.9)
SODIUM SERPL-SCNC: 126 MMOL/L (ref 136–145)
WBC # BLD AUTO: 6.1 K/UL (ref 4–11)

## 2023-12-09 PROCEDURE — 6370000000 HC RX 637 (ALT 250 FOR IP): Performed by: INTERNAL MEDICINE

## 2023-12-09 PROCEDURE — 6370000000 HC RX 637 (ALT 250 FOR IP): Performed by: NURSE PRACTITIONER

## 2023-12-09 PROCEDURE — 2700000000 HC OXYGEN THERAPY PER DAY

## 2023-12-09 PROCEDURE — 90935 HEMODIALYSIS ONE EVALUATION: CPT

## 2023-12-09 PROCEDURE — 83735 ASSAY OF MAGNESIUM: CPT

## 2023-12-09 PROCEDURE — 94660 CPAP INITIATION&MGMT: CPT

## 2023-12-09 PROCEDURE — 6360000002 HC RX W HCPCS: Performed by: PEDIATRICS

## 2023-12-09 PROCEDURE — 94761 N-INVAS EAR/PLS OXIMETRY MLT: CPT

## 2023-12-09 PROCEDURE — 6370000000 HC RX 637 (ALT 250 FOR IP): Performed by: STUDENT IN AN ORGANIZED HEALTH CARE EDUCATION/TRAINING PROGRAM

## 2023-12-09 PROCEDURE — 99232 SBSQ HOSP IP/OBS MODERATE 35: CPT | Performed by: INTERNAL MEDICINE

## 2023-12-09 PROCEDURE — 6360000002 HC RX W HCPCS

## 2023-12-09 PROCEDURE — 85025 COMPLETE CBC W/AUTO DIFF WBC: CPT

## 2023-12-09 PROCEDURE — 2580000003 HC RX 258: Performed by: PEDIATRICS

## 2023-12-09 PROCEDURE — 80048 BASIC METABOLIC PNL TOTAL CA: CPT

## 2023-12-09 PROCEDURE — 2060000000 HC ICU INTERMEDIATE R&B

## 2023-12-09 RX ORDER — HEPARIN SODIUM 1000 [USP'U]/ML
INJECTION, SOLUTION INTRAVENOUS; SUBCUTANEOUS
Status: COMPLETED
Start: 2023-12-09 | End: 2023-12-09

## 2023-12-09 RX ORDER — INSULIN GLARGINE 100 [IU]/ML
10 INJECTION, SOLUTION SUBCUTANEOUS NIGHTLY
Status: DISCONTINUED | OUTPATIENT
Start: 2023-12-10 | End: 2023-12-10

## 2023-12-09 RX ADMIN — ISOSORBIDE DINITRATE 20 MG: 20 TABLET ORAL at 15:14

## 2023-12-09 RX ADMIN — DULOXETINE HYDROCHLORIDE 60 MG: 60 CAPSULE, DELAYED RELEASE ORAL at 09:04

## 2023-12-09 RX ADMIN — PRAVASTATIN SODIUM 40 MG: 40 TABLET ORAL at 09:04

## 2023-12-09 RX ADMIN — TRAZODONE HYDROCHLORIDE 50 MG: 50 TABLET ORAL at 19:50

## 2023-12-09 RX ADMIN — ISOSORBIDE DINITRATE 20 MG: 20 TABLET ORAL at 09:04

## 2023-12-09 RX ADMIN — CLOPIDOGREL BISULFATE 75 MG: 75 TABLET ORAL at 09:04

## 2023-12-09 RX ADMIN — SODIUM CHLORIDE, PRESERVATIVE FREE 10 ML: 5 INJECTION INTRAVENOUS at 09:05

## 2023-12-09 RX ADMIN — METOPROLOL SUCCINATE 100 MG: 50 TABLET, EXTENDED RELEASE ORAL at 09:04

## 2023-12-09 RX ADMIN — HEPARIN SODIUM 5000 UNITS: 5000 INJECTION INTRAVENOUS; SUBCUTANEOUS at 15:14

## 2023-12-09 RX ADMIN — HEPARIN SODIUM 3600 UNITS: 1000 INJECTION INTRAVENOUS; SUBCUTANEOUS at 14:30

## 2023-12-09 RX ADMIN — SODIUM CHLORIDE, PRESERVATIVE FREE 10 ML: 5 INJECTION INTRAVENOUS at 19:51

## 2023-12-09 RX ADMIN — GUAIFENESIN AND DEXTROMETHORPHAN 5 ML: 100; 10 SYRUP ORAL at 19:43

## 2023-12-09 RX ADMIN — QUETIAPINE FUMARATE 50 MG: 25 TABLET ORAL at 19:49

## 2023-12-09 RX ADMIN — PANTOPRAZOLE SODIUM 40 MG: 40 TABLET, DELAYED RELEASE ORAL at 09:04

## 2023-12-09 RX ADMIN — HEPARIN SODIUM 5000 UNITS: 5000 INJECTION INTRAVENOUS; SUBCUTANEOUS at 23:07

## 2023-12-09 RX ADMIN — INSULIN GLARGINE 15 UNITS: 100 INJECTION, SOLUTION SUBCUTANEOUS at 09:50

## 2023-12-09 RX ADMIN — METOPROLOL SUCCINATE 100 MG: 50 TABLET, EXTENDED RELEASE ORAL at 19:49

## 2023-12-09 RX ADMIN — HEPARIN SODIUM 5000 UNITS: 5000 INJECTION INTRAVENOUS; SUBCUTANEOUS at 06:20

## 2023-12-09 RX ADMIN — ISOSORBIDE DINITRATE 20 MG: 20 TABLET ORAL at 19:49

## 2023-12-09 NOTE — CONSULTS
12/08/23 @ [phone] STAT Consult placed to [facility]
Nutrition Education    RD received consult for HF diet education. Pt sleeping soundly during visit. On BiPaP, not appropriate for diet education at this time. RD left CHF diet education handout at bedside. Handout reviews low sodium, daily weights, and fluid restriction. Will follow up for diet education as pt/RD schedule allows. Will continue to monitor per nutrition standards of care.      Augustine Osgood, RD, LD  Contact Number: [phone]
[facility] Wound Ostomy Continence Nurse  Consult Note       NAME:  Nimesh York  MEDICAL RECORD NUMBER:  [id number]  AGE: 52 y.o. GENDER: male  : 1970  TODAY'S DATE:  2023    Subjective; I don't know what's wrong with them (feet). Reason for WOCN Evaluation and Assessment:     both feet diabetic wounds         Nimesh York is a 52 y.o. male referred by:   [] Physician  [x] Nursing  [] Other:     Wound Identification:  Wound Type: diabetic and non-healing surgical  Contributing Factors: diabetes    Wound History: Patient has been hospitalized several times over past months.   Wound Care has followed healing of right amputation of 5th toe since 2023  Current Wound Care Treatment:  gauze wrap    Patient Goal of Care:  [x] Wound Healing  [] Odor Control  [] Palliative Care  [x] Pain Control   [] Other:         PAST MEDICAL HISTORY        Diagnosis Date    Ambulatory dysfunction     walker for long distances, SOB with distance    Aortic stenosis     echo     Arthritis     hands and hips    Asthma     Bilateral hilar adenopathy syndrome 6/3/2013    CAD (coronary artery disease)     Dr. Carolann Smith [facility]) 2019    EF= 43%    CHF (congestive heart failure) ([address])     Chronic pain     COPD (chronic obstructive pulmonary disease) ([address])     pulmonology Dr. uN Griffin    Depression     Diabetes mellitus ([address])     borderline    Difficult intravenous access     Emphysema of lung ([address])     ESRD (end stage renal disease) on dialysis ([address])     MWF    Fear of needles     Gastric ulcer     GERD (gastroesophageal reflux disease)     Heart valve problem     bicuspic valve    Hemodialysis patient ([address])     History of spinal fracture     work incident    Hx of blood clots     Bilateral lower extremities; stents in place    Hyperlipidemia     Hypertension     MI (myocardial infarction) ([address]) 2019    has had 9 MIs. 2019 was the last    Neuromuscular disorder ([address])     due to CVA
infection 05/27/2023    Gangrene of right foot ([address]) 05/25/2023    Symptomatic bradycardia 05/24/2023    Pulmonary HTN ([address]) 05/24/2023    Aortic valve disease 05/24/2023    Cardiogenic shock ([address]) 05/24/2023    Ulcer with gangrene, with unspecified severity ([address]) 05/22/2023    Abnormal cardiovascular stress test 05/10/2023    Chest heaviness 05/08/2023    Respiratory failure ([address]) 04/18/2022    Pulmonary edema with diastolic CHF, NYHA class 3 ([address]) 03/17/2022    [place]-rectal abscess     Somnolence     Atrial flutter ([address])     SIRS (systemic inflammatory response syndrome) ([address]) 02/21/2022    NSTEMI (non-ST elevated myocardial infarction) ([address]) 01/12/2022    Acute respiratory failure with hypercapnia ([address]) 11/30/2021    Acute pulmonary edema ([address]) 11/30/2021    Grade II diastolic dysfunction 71/51/0584    Shock circulatory ([address]) 11/30/2021    Smoker 11/30/2021    Normocytic normochromic anemia 11/30/2021    Respiratory failure with hypoxia ([address]) 11/29/2021    Speech problem     Urinary tract infection with hematuria     Acute CVA (cerebrovascular accident) ([address]) 09/07/2021    Acute hypoxemic respiratory failure ([address]) 08/12/2021    Uncontrolled type 2 diabetes mellitus with hyperglycemia ([address]) 06/27/2021    Acute encephalopathy 06/27/2021    Cellulitis and abscess of hand 04/24/2021    Iliac artery occlusion, right (HCC)     Cellulitis of right foot 01/29/2021    Peripheral vascular occlusive disease ([address]) 01/02/2021    Ataxia     Weakness of both lower extremities 12/30/2020    Sinus bradycardia 12/30/2020    Sinus pause     GBS (Guillain-[place] syndrome) ([facility])     Bilateral leg weakness 12/04/2020    Bradycardia 10/19/2019    Syncope and collapse 10/19/2019    PAF (paroxysmal atrial fibrillation) ([facility])     Hypercholesteremia 07/30/2019    Chronic bronchitis ([address]) 05/21/2019    Nasal congestion 05/21/2019    Chronic, continuous use of opioids 04/15/2019    Steal syndrome of dialysis vascular access (HCC)     SOB
CALCIUM 9.3 12/08/2023 05:09 AM    GFRAA 10 10/05/2022 11:53 AM    GFRAA >60 05/17/2013 06:50 AM    LABGLOM 7 12/08/2023 05:09 AM    GLUCOSE 336 12/08/2023 05:09 AM         Assessment/    - End stage kidney disease - on HD MWF    - Acute hypoxic, hypercarbic respiratory failure from missed HD    - Hyponatremia    - Anion-gap metabolic acidosis    - Anemia of chronic disease      Plan/    - Repeat HD tomorrow with UF as tolerated, EDW 96.5 kg  - Trend labs, bp's, weights    Thank you for the consultation. Please do not hesitate to call with questions. ____________________________________  Alyssa Aguirre MD  [facility]  [email or web address]  Office: [phone]

## 2023-12-09 NOTE — FLOWSHEET NOTE
Treatment time: 3.5 hours  Net UF: 3500 ml    Pre weight: 97.6 kg   Post weight: 93.6 kg  EDW: 96.5 kg -out pt HD clinic DW    Access used: Left chest wall TDC  Access function: Good with  ml/min    Medications or blood products given: no    Regular outpatient schedule: [facility] Summary of response to treatment: Pt tolerated well with HD, vital signs stable, HD completed in full, saline flush and heparin dwell in [facility] ports, capped and clamped    Cirt Line: -malfunction       Copy of dialysis treatment record placed in chart, to be scanned into EMR.

## 2023-12-10 LAB
ANION GAP SERPL CALCULATED.3IONS-SCNC: 15 MMOL/L (ref 3–16)
BASOPHILS # BLD: 0 K/UL (ref 0–0.2)
BASOPHILS NFR BLD: 0.3 %
BUN SERPL-MCNC: 42 MG/DL (ref 7–20)
CALCIUM SERPL-MCNC: 9.1 MG/DL (ref 8.3–10.6)
CHLORIDE SERPL-SCNC: 91 MMOL/L (ref 99–110)
CO2 SERPL-SCNC: 24 MMOL/L (ref 21–32)
CREAT SERPL-MCNC: 5 MG/DL (ref 0.9–1.3)
DEPRECATED RDW RBC AUTO: 17.1 % (ref 12.4–15.4)
EOSINOPHIL # BLD: 0.1 K/UL (ref 0–0.6)
EOSINOPHIL NFR BLD: 2 %
GFR SERPLBLD CREATININE-BSD FMLA CKD-EPI: 13 ML/MIN/{1.73_M2}
GLUCOSE BLD-MCNC: 177 MG/DL (ref 70–99)
GLUCOSE BLD-MCNC: 74 MG/DL (ref 70–99)
GLUCOSE BLD-MCNC: 89 MG/DL (ref 70–99)
GLUCOSE BLD-MCNC: 96 MG/DL (ref 70–99)
GLUCOSE SERPL-MCNC: 88 MG/DL (ref 70–99)
HCT VFR BLD AUTO: 27.1 % (ref 40.5–52.5)
HGB BLD-MCNC: 9.2 G/DL (ref 13.5–17.5)
LYMPHOCYTES # BLD: 1.1 K/UL (ref 1–5.1)
LYMPHOCYTES NFR BLD: 14.5 %
MAGNESIUM SERPL-MCNC: 2 MG/DL (ref 1.8–2.4)
MCH RBC QN AUTO: 29.4 PG (ref 26–34)
MCHC RBC AUTO-ENTMCNC: 34 G/DL (ref 31–36)
MCV RBC AUTO: 86.4 FL (ref 80–100)
MONOCYTES # BLD: 0.4 K/UL (ref 0–1.3)
MONOCYTES NFR BLD: 6.1 %
NEUTROPHILS # BLD: 5.6 K/UL (ref 1.7–7.7)
NEUTROPHILS NFR BLD: 77.1 %
PERFORMED ON: ABNORMAL
PERFORMED ON: NORMAL
PLATELET # BLD AUTO: 249 K/UL (ref 135–450)
PMV BLD AUTO: 8 FL (ref 5–10.5)
POTASSIUM SERPL-SCNC: 4 MMOL/L (ref 3.5–5.1)
POTASSIUM SERPL-SCNC: 4 MMOL/L (ref 3.5–5.1)
RBC # BLD AUTO: 3.14 M/UL (ref 4.2–5.9)
SODIUM SERPL-SCNC: 130 MMOL/L (ref 136–145)
WBC # BLD AUTO: 7.3 K/UL (ref 4–11)

## 2023-12-10 PROCEDURE — 6360000002 HC RX W HCPCS: Performed by: PEDIATRICS

## 2023-12-10 PROCEDURE — 85025 COMPLETE CBC W/AUTO DIFF WBC: CPT

## 2023-12-10 PROCEDURE — 6370000000 HC RX 637 (ALT 250 FOR IP): Performed by: INTERNAL MEDICINE

## 2023-12-10 PROCEDURE — 2060000000 HC ICU INTERMEDIATE R&B

## 2023-12-10 PROCEDURE — 6370000000 HC RX 637 (ALT 250 FOR IP): Performed by: NURSE PRACTITIONER

## 2023-12-10 PROCEDURE — 83735 ASSAY OF MAGNESIUM: CPT

## 2023-12-10 PROCEDURE — 94761 N-INVAS EAR/PLS OXIMETRY MLT: CPT

## 2023-12-10 PROCEDURE — 94660 CPAP INITIATION&MGMT: CPT

## 2023-12-10 PROCEDURE — 80048 BASIC METABOLIC PNL TOTAL CA: CPT

## 2023-12-10 PROCEDURE — 2580000003 HC RX 258: Performed by: PEDIATRICS

## 2023-12-10 PROCEDURE — 36415 COLL VENOUS BLD VENIPUNCTURE: CPT

## 2023-12-10 PROCEDURE — 02HV33Z INSERTION OF INFUSION DEVICE INTO SUPERIOR VENA CAVA, PERCUTANEOUS APPROACH: ICD-10-PCS | Performed by: INTERNAL MEDICINE

## 2023-12-10 PROCEDURE — 2700000000 HC OXYGEN THERAPY PER DAY

## 2023-12-10 RX ORDER — INSULIN GLARGINE 100 [IU]/ML
7 INJECTION, SOLUTION SUBCUTANEOUS NIGHTLY
Status: DISCONTINUED | OUTPATIENT
Start: 2023-12-10 | End: 2023-12-14 | Stop reason: HOSPADM

## 2023-12-10 RX ADMIN — QUETIAPINE FUMARATE 50 MG: 25 TABLET ORAL at 21:37

## 2023-12-10 RX ADMIN — METOPROLOL SUCCINATE 100 MG: 50 TABLET, EXTENDED RELEASE ORAL at 21:37

## 2023-12-10 RX ADMIN — GUAIFENESIN AND DEXTROMETHORPHAN 5 ML: 100; 10 SYRUP ORAL at 17:37

## 2023-12-10 RX ADMIN — HEPARIN SODIUM 5000 UNITS: 5000 INJECTION INTRAVENOUS; SUBCUTANEOUS at 14:33

## 2023-12-10 RX ADMIN — SODIUM CHLORIDE, PRESERVATIVE FREE 10 ML: 5 INJECTION INTRAVENOUS at 11:19

## 2023-12-10 RX ADMIN — HEPARIN SODIUM 5000 UNITS: 5000 INJECTION INTRAVENOUS; SUBCUTANEOUS at 06:30

## 2023-12-10 RX ADMIN — TRAZODONE HYDROCHLORIDE 50 MG: 50 TABLET ORAL at 21:36

## 2023-12-10 RX ADMIN — HEPARIN SODIUM 5000 UNITS: 5000 INJECTION INTRAVENOUS; SUBCUTANEOUS at 21:37

## 2023-12-10 RX ADMIN — ISOSORBIDE DINITRATE 20 MG: 20 TABLET ORAL at 21:36

## 2023-12-10 RX ADMIN — ISOSORBIDE DINITRATE 20 MG: 20 TABLET ORAL at 11:20

## 2023-12-10 RX ADMIN — DULOXETINE HYDROCHLORIDE 60 MG: 60 CAPSULE, DELAYED RELEASE ORAL at 10:29

## 2023-12-10 RX ADMIN — PANTOPRAZOLE SODIUM 40 MG: 40 TABLET, DELAYED RELEASE ORAL at 10:29

## 2023-12-10 RX ADMIN — INSULIN GLARGINE 7 UNITS: 100 INJECTION, SOLUTION SUBCUTANEOUS at 21:37

## 2023-12-10 RX ADMIN — CLOPIDOGREL BISULFATE 75 MG: 75 TABLET ORAL at 10:29

## 2023-12-10 RX ADMIN — PRAVASTATIN SODIUM 40 MG: 40 TABLET ORAL at 10:29

## 2023-12-10 RX ADMIN — SODIUM CHLORIDE, PRESERVATIVE FREE 10 ML: 5 INJECTION INTRAVENOUS at 21:41

## 2023-12-11 ENCOUNTER — APPOINTMENT (OUTPATIENT)
Dept: GENERAL RADIOLOGY | Age: 53
DRG: 291 | End: 2023-12-11
Payer: MEDICARE

## 2023-12-11 LAB
ANION GAP SERPL CALCULATED.3IONS-SCNC: 16 MMOL/L (ref 3–16)
BASOPHILS # BLD: 0 K/UL (ref 0–0.2)
BASOPHILS NFR BLD: 0.5 %
BUN SERPL-MCNC: 57 MG/DL (ref 7–20)
CALCIUM SERPL-MCNC: 9.1 MG/DL (ref 8.3–10.6)
CHLORIDE SERPL-SCNC: 92 MMOL/L (ref 99–110)
CO2 SERPL-SCNC: 23 MMOL/L (ref 21–32)
CREAT SERPL-MCNC: 6.8 MG/DL (ref 0.9–1.3)
DEPRECATED RDW RBC AUTO: 16.9 % (ref 12.4–15.4)
EOSINOPHIL # BLD: 0.4 K/UL (ref 0–0.6)
EOSINOPHIL NFR BLD: 4.7 %
GFR SERPLBLD CREATININE-BSD FMLA CKD-EPI: 9 ML/MIN/{1.73_M2}
GLUCOSE BLD-MCNC: 101 MG/DL (ref 70–99)
GLUCOSE BLD-MCNC: 127 MG/DL (ref 70–99)
GLUCOSE BLD-MCNC: 176 MG/DL (ref 70–99)
GLUCOSE BLD-MCNC: 73 MG/DL (ref 70–99)
GLUCOSE SERPL-MCNC: 65 MG/DL (ref 70–99)
HCT VFR BLD AUTO: 28.9 % (ref 40.5–52.5)
HGB BLD-MCNC: 9.7 G/DL (ref 13.5–17.5)
LYMPHOCYTES # BLD: 1.2 K/UL (ref 1–5.1)
LYMPHOCYTES NFR BLD: 15 %
MAGNESIUM SERPL-MCNC: 1.9 MG/DL (ref 1.8–2.4)
MCH RBC QN AUTO: 29.1 PG (ref 26–34)
MCHC RBC AUTO-ENTMCNC: 33.4 G/DL (ref 31–36)
MCV RBC AUTO: 87.1 FL (ref 80–100)
MONOCYTES # BLD: 0.7 K/UL (ref 0–1.3)
MONOCYTES NFR BLD: 8.4 %
NEUTROPHILS # BLD: 5.6 K/UL (ref 1.7–7.7)
NEUTROPHILS NFR BLD: 71.4 %
NT-PROBNP SERPL-MCNC: ABNORMAL PG/ML (ref 0–124)
PERFORMED ON: ABNORMAL
PERFORMED ON: NORMAL
PLATELET # BLD AUTO: 255 K/UL (ref 135–450)
PMV BLD AUTO: 7.7 FL (ref 5–10.5)
POTASSIUM SERPL-SCNC: 4.2 MMOL/L (ref 3.5–5.1)
RBC # BLD AUTO: 3.32 M/UL (ref 4.2–5.9)
SODIUM SERPL-SCNC: 131 MMOL/L (ref 136–145)
WBC # BLD AUTO: 7.9 K/UL (ref 4–11)

## 2023-12-11 PROCEDURE — 83880 ASSAY OF NATRIURETIC PEPTIDE: CPT

## 2023-12-11 PROCEDURE — 94761 N-INVAS EAR/PLS OXIMETRY MLT: CPT

## 2023-12-11 PROCEDURE — 94660 CPAP INITIATION&MGMT: CPT

## 2023-12-11 PROCEDURE — 6370000000 HC RX 637 (ALT 250 FOR IP): Performed by: NURSE PRACTITIONER

## 2023-12-11 PROCEDURE — 71045 X-RAY EXAM CHEST 1 VIEW: CPT

## 2023-12-11 PROCEDURE — 2700000000 HC OXYGEN THERAPY PER DAY

## 2023-12-11 PROCEDURE — 36415 COLL VENOUS BLD VENIPUNCTURE: CPT

## 2023-12-11 PROCEDURE — 80048 BASIC METABOLIC PNL TOTAL CA: CPT

## 2023-12-11 PROCEDURE — 2060000000 HC ICU INTERMEDIATE R&B

## 2023-12-11 PROCEDURE — 6360000002 HC RX W HCPCS: Performed by: PEDIATRICS

## 2023-12-11 PROCEDURE — 83735 ASSAY OF MAGNESIUM: CPT

## 2023-12-11 PROCEDURE — 85025 COMPLETE CBC W/AUTO DIFF WBC: CPT

## 2023-12-11 PROCEDURE — 90935 HEMODIALYSIS ONE EVALUATION: CPT

## 2023-12-11 PROCEDURE — 6370000000 HC RX 637 (ALT 250 FOR IP): Performed by: INTERNAL MEDICINE

## 2023-12-11 PROCEDURE — 2580000003 HC RX 258: Performed by: PEDIATRICS

## 2023-12-11 RX ORDER — ALBUMIN, HUMAN INJ 5% 5 %
25 SOLUTION INTRAVENOUS ONCE
Status: COMPLETED | OUTPATIENT
Start: 2023-12-12 | End: 2023-12-12

## 2023-12-11 RX ADMIN — DULOXETINE HYDROCHLORIDE 60 MG: 60 CAPSULE, DELAYED RELEASE ORAL at 14:23

## 2023-12-11 RX ADMIN — ISOSORBIDE DINITRATE 20 MG: 20 TABLET ORAL at 14:24

## 2023-12-11 RX ADMIN — HEPARIN SODIUM 5000 UNITS: 5000 INJECTION INTRAVENOUS; SUBCUTANEOUS at 21:10

## 2023-12-11 RX ADMIN — PRAVASTATIN SODIUM 40 MG: 40 TABLET ORAL at 14:24

## 2023-12-11 RX ADMIN — ISOSORBIDE DINITRATE 20 MG: 20 TABLET ORAL at 21:10

## 2023-12-11 RX ADMIN — QUETIAPINE FUMARATE 50 MG: 25 TABLET ORAL at 21:10

## 2023-12-11 RX ADMIN — TRAZODONE HYDROCHLORIDE 50 MG: 50 TABLET ORAL at 21:10

## 2023-12-11 RX ADMIN — GUAIFENESIN AND DEXTROMETHORPHAN 5 ML: 100; 10 SYRUP ORAL at 15:38

## 2023-12-11 RX ADMIN — ISOSORBIDE DINITRATE 20 MG: 20 TABLET ORAL at 14:33

## 2023-12-11 RX ADMIN — METOPROLOL SUCCINATE 100 MG: 50 TABLET, EXTENDED RELEASE ORAL at 14:23

## 2023-12-11 RX ADMIN — CLOPIDOGREL BISULFATE 75 MG: 75 TABLET ORAL at 14:23

## 2023-12-11 RX ADMIN — HEPARIN SODIUM 5000 UNITS: 5000 INJECTION INTRAVENOUS; SUBCUTANEOUS at 05:46

## 2023-12-11 RX ADMIN — METOPROLOL SUCCINATE 100 MG: 50 TABLET, EXTENDED RELEASE ORAL at 21:10

## 2023-12-11 RX ADMIN — HEPARIN SODIUM 5000 UNITS: 5000 INJECTION INTRAVENOUS; SUBCUTANEOUS at 14:24

## 2023-12-11 RX ADMIN — INSULIN GLARGINE 7 UNITS: 100 INJECTION, SOLUTION SUBCUTANEOUS at 21:11

## 2023-12-11 RX ADMIN — SODIUM CHLORIDE, PRESERVATIVE FREE 10 ML: 5 INJECTION INTRAVENOUS at 14:29

## 2023-12-11 RX ADMIN — GUAIFENESIN AND DEXTROMETHORPHAN 5 ML: 100; 10 SYRUP ORAL at 21:10

## 2023-12-11 ASSESSMENT — PAIN DESCRIPTION - LOCATION
LOCATION: BACK
LOCATION: BACK

## 2023-12-11 ASSESSMENT — PAIN SCALES - GENERAL
PAINLEVEL_OUTOF10: 8
PAINLEVEL_OUTOF10: 8

## 2023-12-11 ASSESSMENT — PAIN DESCRIPTION - ORIENTATION: ORIENTATION: LOWER

## 2023-12-12 LAB
ANION GAP SERPL CALCULATED.3IONS-SCNC: 14 MMOL/L (ref 3–16)
BACTERIA BLD CULT ORG #2: NORMAL
BACTERIA BLD CULT: NORMAL
BUN SERPL-MCNC: 42 MG/DL (ref 7–20)
CALCIUM SERPL-MCNC: 9.4 MG/DL (ref 8.3–10.6)
CHLORIDE SERPL-SCNC: 92 MMOL/L (ref 99–110)
CO2 SERPL-SCNC: 25 MMOL/L (ref 21–32)
CREAT SERPL-MCNC: 5 MG/DL (ref 0.9–1.3)
GFR SERPLBLD CREATININE-BSD FMLA CKD-EPI: 13 ML/MIN/{1.73_M2}
GLUCOSE BLD-MCNC: 123 MG/DL (ref 70–99)
GLUCOSE BLD-MCNC: 158 MG/DL (ref 70–99)
GLUCOSE BLD-MCNC: 188 MG/DL (ref 70–99)
GLUCOSE BLD-MCNC: 211 MG/DL (ref 70–99)
GLUCOSE SERPL-MCNC: 130 MG/DL (ref 70–99)
MAGNESIUM SERPL-MCNC: 2 MG/DL (ref 1.8–2.4)
PERFORMED ON: ABNORMAL
POTASSIUM SERPL-SCNC: 4.1 MMOL/L (ref 3.5–5.1)
SODIUM SERPL-SCNC: 131 MMOL/L (ref 136–145)

## 2023-12-12 PROCEDURE — 97166 OT EVAL MOD COMPLEX 45 MIN: CPT

## 2023-12-12 PROCEDURE — P9045 ALBUMIN (HUMAN), 5%, 250 ML: HCPCS | Performed by: NURSE PRACTITIONER

## 2023-12-12 PROCEDURE — 36415 COLL VENOUS BLD VENIPUNCTURE: CPT

## 2023-12-12 PROCEDURE — 2580000003 HC RX 258: Performed by: PEDIATRICS

## 2023-12-12 PROCEDURE — 80048 BASIC METABOLIC PNL TOTAL CA: CPT

## 2023-12-12 PROCEDURE — 6360000002 HC RX W HCPCS: Performed by: NURSE PRACTITIONER

## 2023-12-12 PROCEDURE — 6360000002 HC RX W HCPCS: Performed by: PEDIATRICS

## 2023-12-12 PROCEDURE — 94660 CPAP INITIATION&MGMT: CPT

## 2023-12-12 PROCEDURE — 6370000000 HC RX 637 (ALT 250 FOR IP): Performed by: NURSE PRACTITIONER

## 2023-12-12 PROCEDURE — 2700000000 HC OXYGEN THERAPY PER DAY

## 2023-12-12 PROCEDURE — 6370000000 HC RX 637 (ALT 250 FOR IP): Performed by: INTERNAL MEDICINE

## 2023-12-12 PROCEDURE — 83735 ASSAY OF MAGNESIUM: CPT

## 2023-12-12 PROCEDURE — 94761 N-INVAS EAR/PLS OXIMETRY MLT: CPT

## 2023-12-12 PROCEDURE — 2060000000 HC ICU INTERMEDIATE R&B

## 2023-12-12 RX ADMIN — ISOSORBIDE DINITRATE 20 MG: 20 TABLET ORAL at 09:13

## 2023-12-12 RX ADMIN — METOPROLOL SUCCINATE 100 MG: 50 TABLET, EXTENDED RELEASE ORAL at 20:03

## 2023-12-12 RX ADMIN — INSULIN GLARGINE 7 UNITS: 100 INJECTION, SOLUTION SUBCUTANEOUS at 20:03

## 2023-12-12 RX ADMIN — HEPARIN SODIUM 5000 UNITS: 5000 INJECTION INTRAVENOUS; SUBCUTANEOUS at 22:39

## 2023-12-12 RX ADMIN — GUAIFENESIN AND DEXTROMETHORPHAN 5 ML: 100; 10 SYRUP ORAL at 17:35

## 2023-12-12 RX ADMIN — ALBUMIN (HUMAN) 25 G: 12.5 INJECTION, SOLUTION INTRAVENOUS at 00:06

## 2023-12-12 RX ADMIN — HEPARIN SODIUM 5000 UNITS: 5000 INJECTION INTRAVENOUS; SUBCUTANEOUS at 05:34

## 2023-12-12 RX ADMIN — QUETIAPINE FUMARATE 50 MG: 25 TABLET ORAL at 20:03

## 2023-12-12 RX ADMIN — ISOSORBIDE DINITRATE 20 MG: 20 TABLET ORAL at 14:08

## 2023-12-12 RX ADMIN — CLOPIDOGREL BISULFATE 75 MG: 75 TABLET ORAL at 09:13

## 2023-12-12 RX ADMIN — TRAZODONE HYDROCHLORIDE 50 MG: 50 TABLET ORAL at 22:39

## 2023-12-12 RX ADMIN — ISOSORBIDE DINITRATE 20 MG: 20 TABLET ORAL at 20:03

## 2023-12-12 RX ADMIN — DULOXETINE HYDROCHLORIDE 60 MG: 60 CAPSULE, DELAYED RELEASE ORAL at 09:08

## 2023-12-12 RX ADMIN — HEPARIN SODIUM 5000 UNITS: 5000 INJECTION INTRAVENOUS; SUBCUTANEOUS at 14:08

## 2023-12-12 RX ADMIN — SODIUM CHLORIDE, PRESERVATIVE FREE 10 ML: 5 INJECTION INTRAVENOUS at 09:13

## 2023-12-12 RX ADMIN — PANTOPRAZOLE SODIUM 40 MG: 40 TABLET, DELAYED RELEASE ORAL at 05:34

## 2023-12-12 RX ADMIN — SODIUM CHLORIDE, PRESERVATIVE FREE 10 ML: 5 INJECTION INTRAVENOUS at 20:06

## 2023-12-12 RX ADMIN — PRAVASTATIN SODIUM 40 MG: 40 TABLET ORAL at 09:13

## 2023-12-12 RX ADMIN — GUAIFENESIN AND DEXTROMETHORPHAN 5 ML: 100; 10 SYRUP ORAL at 11:39

## 2023-12-12 ASSESSMENT — PAIN SCALES - GENERAL
PAINLEVEL_OUTOF10: 0

## 2023-12-13 LAB
ANION GAP SERPL CALCULATED.3IONS-SCNC: 16 MMOL/L (ref 3–16)
BUN SERPL-MCNC: 58 MG/DL (ref 7–20)
CALCIUM SERPL-MCNC: 9.4 MG/DL (ref 8.3–10.6)
CHLORIDE SERPL-SCNC: 92 MMOL/L (ref 99–110)
CO2 SERPL-SCNC: 24 MMOL/L (ref 21–32)
CREAT SERPL-MCNC: 7 MG/DL (ref 0.9–1.3)
GFR SERPLBLD CREATININE-BSD FMLA CKD-EPI: 9 ML/MIN/{1.73_M2}
GLUCOSE BLD-MCNC: 125 MG/DL (ref 70–99)
GLUCOSE BLD-MCNC: 143 MG/DL (ref 70–99)
GLUCOSE BLD-MCNC: 172 MG/DL (ref 70–99)
GLUCOSE BLD-MCNC: 210 MG/DL (ref 70–99)
GLUCOSE SERPL-MCNC: 191 MG/DL (ref 70–99)
MAGNESIUM SERPL-MCNC: 2 MG/DL (ref 1.8–2.4)
PERFORMED ON: ABNORMAL
POTASSIUM SERPL-SCNC: 3.9 MMOL/L (ref 3.5–5.1)
SODIUM SERPL-SCNC: 132 MMOL/L (ref 136–145)

## 2023-12-13 PROCEDURE — 94640 AIRWAY INHALATION TREATMENT: CPT

## 2023-12-13 PROCEDURE — 2060000000 HC ICU INTERMEDIATE R&B

## 2023-12-13 PROCEDURE — 90935 HEMODIALYSIS ONE EVALUATION: CPT

## 2023-12-13 PROCEDURE — 97162 PT EVAL MOD COMPLEX 30 MIN: CPT

## 2023-12-13 PROCEDURE — 6370000000 HC RX 637 (ALT 250 FOR IP): Performed by: INTERNAL MEDICINE

## 2023-12-13 PROCEDURE — 6370000000 HC RX 637 (ALT 250 FOR IP): Performed by: NURSE PRACTITIONER

## 2023-12-13 PROCEDURE — 83735 ASSAY OF MAGNESIUM: CPT

## 2023-12-13 PROCEDURE — 97530 THERAPEUTIC ACTIVITIES: CPT

## 2023-12-13 PROCEDURE — 6370000000 HC RX 637 (ALT 250 FOR IP): Performed by: PEDIATRICS

## 2023-12-13 PROCEDURE — 94761 N-INVAS EAR/PLS OXIMETRY MLT: CPT

## 2023-12-13 PROCEDURE — 6360000002 HC RX W HCPCS: Performed by: PEDIATRICS

## 2023-12-13 PROCEDURE — 2700000000 HC OXYGEN THERAPY PER DAY

## 2023-12-13 PROCEDURE — 36415 COLL VENOUS BLD VENIPUNCTURE: CPT

## 2023-12-13 PROCEDURE — 80048 BASIC METABOLIC PNL TOTAL CA: CPT

## 2023-12-13 PROCEDURE — 2580000003 HC RX 258: Performed by: PEDIATRICS

## 2023-12-13 RX ORDER — HEPARIN SODIUM 1000 [USP'U]/ML
INJECTION, SOLUTION INTRAVENOUS; SUBCUTANEOUS
Status: DISPENSED
Start: 2023-12-13 | End: 2023-12-13

## 2023-12-13 RX ADMIN — DULOXETINE HYDROCHLORIDE 60 MG: 60 CAPSULE, DELAYED RELEASE ORAL at 14:31

## 2023-12-13 RX ADMIN — METOPROLOL SUCCINATE 100 MG: 50 TABLET, EXTENDED RELEASE ORAL at 14:30

## 2023-12-13 RX ADMIN — HEPARIN SODIUM 5000 UNITS: 5000 INJECTION INTRAVENOUS; SUBCUTANEOUS at 20:30

## 2023-12-13 RX ADMIN — QUETIAPINE FUMARATE 50 MG: 25 TABLET ORAL at 20:30

## 2023-12-13 RX ADMIN — GUAIFENESIN AND DEXTROMETHORPHAN 5 ML: 100; 10 SYRUP ORAL at 17:44

## 2023-12-13 RX ADMIN — SODIUM CHLORIDE, PRESERVATIVE FREE 10 ML: 5 INJECTION INTRAVENOUS at 08:49

## 2023-12-13 RX ADMIN — PANTOPRAZOLE SODIUM 40 MG: 40 TABLET, DELAYED RELEASE ORAL at 06:21

## 2023-12-13 RX ADMIN — IPRATROPIUM BROMIDE AND ALBUTEROL SULFATE 1 DOSE: 2.5; .5 SOLUTION RESPIRATORY (INHALATION) at 20:43

## 2023-12-13 RX ADMIN — TRAZODONE HYDROCHLORIDE 50 MG: 50 TABLET ORAL at 22:17

## 2023-12-13 RX ADMIN — CLOPIDOGREL BISULFATE 75 MG: 75 TABLET ORAL at 14:30

## 2023-12-13 RX ADMIN — ISOSORBIDE DINITRATE 20 MG: 20 TABLET ORAL at 14:30

## 2023-12-13 RX ADMIN — SODIUM CHLORIDE, PRESERVATIVE FREE 10 ML: 5 INJECTION INTRAVENOUS at 20:30

## 2023-12-13 RX ADMIN — METOPROLOL SUCCINATE 100 MG: 50 TABLET, EXTENDED RELEASE ORAL at 20:30

## 2023-12-13 RX ADMIN — INSULIN GLARGINE 7 UNITS: 100 INJECTION, SOLUTION SUBCUTANEOUS at 20:30

## 2023-12-13 RX ADMIN — INSULIN LISPRO 2 UNITS: 100 INJECTION, SOLUTION INTRAVENOUS; SUBCUTANEOUS at 17:44

## 2023-12-13 RX ADMIN — HEPARIN SODIUM 5000 UNITS: 5000 INJECTION INTRAVENOUS; SUBCUTANEOUS at 14:42

## 2023-12-13 RX ADMIN — HEPARIN SODIUM 5000 UNITS: 5000 INJECTION INTRAVENOUS; SUBCUTANEOUS at 06:21

## 2023-12-13 RX ADMIN — ISOSORBIDE DINITRATE 20 MG: 20 TABLET ORAL at 20:30

## 2023-12-13 RX ADMIN — PRAVASTATIN SODIUM 40 MG: 40 TABLET ORAL at 14:30

## 2023-12-14 VITALS
SYSTOLIC BLOOD PRESSURE: 128 MMHG | WEIGHT: 211.86 LBS | TEMPERATURE: 98.1 F | OXYGEN SATURATION: 98 % | RESPIRATION RATE: 18 BRPM | HEART RATE: 63 BPM | BODY MASS INDEX: 32.11 KG/M2 | HEIGHT: 68 IN | DIASTOLIC BLOOD PRESSURE: 56 MMHG

## 2023-12-14 LAB
ANION GAP SERPL CALCULATED.3IONS-SCNC: 14 MMOL/L (ref 3–16)
BUN SERPL-MCNC: 45 MG/DL (ref 7–20)
CALCIUM SERPL-MCNC: 9.7 MG/DL (ref 8.3–10.6)
CHLORIDE SERPL-SCNC: 95 MMOL/L (ref 99–110)
CO2 SERPL-SCNC: 25 MMOL/L (ref 21–32)
CREAT SERPL-MCNC: 5.5 MG/DL (ref 0.9–1.3)
GFR SERPLBLD CREATININE-BSD FMLA CKD-EPI: 11 ML/MIN/{1.73_M2}
GLUCOSE BLD-MCNC: 174 MG/DL (ref 70–99)
GLUCOSE BLD-MCNC: 177 MG/DL (ref 70–99)
GLUCOSE SERPL-MCNC: 191 MG/DL (ref 70–99)
NT-PROBNP SERPL-MCNC: ABNORMAL PG/ML (ref 0–124)
PERFORMED ON: ABNORMAL
PERFORMED ON: ABNORMAL
POTASSIUM SERPL-SCNC: 4.1 MMOL/L (ref 3.5–5.1)
SODIUM SERPL-SCNC: 134 MMOL/L (ref 136–145)

## 2023-12-14 PROCEDURE — 83880 ASSAY OF NATRIURETIC PEPTIDE: CPT

## 2023-12-14 PROCEDURE — 80048 BASIC METABOLIC PNL TOTAL CA: CPT

## 2023-12-14 PROCEDURE — 2580000003 HC RX 258: Performed by: PEDIATRICS

## 2023-12-14 PROCEDURE — 94761 N-INVAS EAR/PLS OXIMETRY MLT: CPT

## 2023-12-14 PROCEDURE — 2700000000 HC OXYGEN THERAPY PER DAY

## 2023-12-14 PROCEDURE — 6370000000 HC RX 637 (ALT 250 FOR IP): Performed by: INTERNAL MEDICINE

## 2023-12-14 PROCEDURE — 6360000002 HC RX W HCPCS: Performed by: PEDIATRICS

## 2023-12-14 PROCEDURE — 36415 COLL VENOUS BLD VENIPUNCTURE: CPT

## 2023-12-14 RX ADMIN — METOPROLOL SUCCINATE 100 MG: 50 TABLET, EXTENDED RELEASE ORAL at 08:45

## 2023-12-14 RX ADMIN — DULOXETINE HYDROCHLORIDE 60 MG: 60 CAPSULE, DELAYED RELEASE ORAL at 08:44

## 2023-12-14 RX ADMIN — HEPARIN SODIUM 5000 UNITS: 5000 INJECTION INTRAVENOUS; SUBCUTANEOUS at 14:51

## 2023-12-14 RX ADMIN — ISOSORBIDE DINITRATE 20 MG: 20 TABLET ORAL at 14:50

## 2023-12-14 RX ADMIN — SODIUM CHLORIDE, PRESERVATIVE FREE 10 ML: 5 INJECTION INTRAVENOUS at 08:49

## 2023-12-14 RX ADMIN — HEPARIN SODIUM 5000 UNITS: 5000 INJECTION INTRAVENOUS; SUBCUTANEOUS at 06:31

## 2023-12-14 RX ADMIN — ISOSORBIDE DINITRATE 20 MG: 20 TABLET ORAL at 08:44

## 2023-12-14 RX ADMIN — PRAVASTATIN SODIUM 40 MG: 40 TABLET ORAL at 08:45

## 2023-12-14 RX ADMIN — CLOPIDOGREL BISULFATE 75 MG: 75 TABLET ORAL at 08:44

## 2023-12-14 ASSESSMENT — PAIN DESCRIPTION - ORIENTATION
ORIENTATION: LOWER
ORIENTATION: LOWER

## 2023-12-14 ASSESSMENT — PAIN SCALES - GENERAL
PAINLEVEL_OUTOF10: 8
PAINLEVEL_OUTOF10: 8

## 2023-12-14 ASSESSMENT — PAIN DESCRIPTION - DESCRIPTORS
DESCRIPTORS: SHARP
DESCRIPTORS: SHARP

## 2023-12-14 ASSESSMENT — PAIN DESCRIPTION - LOCATION
LOCATION: BACK
LOCATION: BACK

## 2023-12-14 NOTE — PLAN OF CARE
Increase function to baseline.
Increase patients ADLs/functional status to baseline.
Problem: Discharge Planning  Goal: Discharge to home or other facility with appropriate resources  12/14/2023 0017 by Jesus Gustafson RN  Outcome: Progressing  12/13/2023 1206 by Alvina Carmen RN  Outcome: Progressing     Problem: Chronic Conditions and Co-morbidities  Goal: Patient's chronic conditions and co-morbidity symptoms are monitored and maintained or improved  12/14/2023 0017 by Jesus Gustafson RN  Outcome: Progressing  12/13/2023 1206 by Alvina Carmen RN  Outcome: Progressing     Problem: Safety - Adult  Goal: Free from fall injury  12/14/2023 0017 by Jesus Gustafson RN  Outcome: Progressing  12/13/2023 1206 by Alvina Carmen RN  Outcome: Progressing     Problem: Skin/Tissue Integrity  Goal: Absence of new skin breakdown  Description: 1. Monitor for areas of redness and/or skin breakdown  2. Assess vascular access sites hourly  3. Every 4-6 hours minimum:  Change oxygen saturation probe site  4. Every 4-6 hours:  If on nasal continuous positive airway pressure, respiratory therapy assess nares and determine need for appliance change or resting period.   12/14/2023 0017 by Jesus Gustafson RN  Outcome: Progressing  12/13/2023 1206 by Alvina Carmen RN  Outcome: Progressing     Problem: Pain  Goal: Verbalizes/displays adequate comfort level or baseline comfort level  12/14/2023 0017 by Jesus Gustafson RN  Outcome: Progressing  12/13/2023 1206 by Alvina Carmen RN  Outcome: Progressing
Problem: Discharge Planning  Goal: Discharge to home or other facility with appropriate resources  12/14/2023 1230 by Logan Gudino RN  Outcome: Completed  12/14/2023 0017 by Sima Reyes RN  Outcome: Progressing     Problem: Chronic Conditions and Co-morbidities  Goal: Patient's chronic conditions and co-morbidity symptoms are monitored and maintained or improved  12/14/2023 1230 by Logan Gudino RN  Outcome: Completed  12/14/2023 0017 by Sima Reyes RN  Outcome: Progressing     Problem: Safety - Adult  Goal: Free from fall injury  12/14/2023 1230 by Logan Gudino RN  Outcome: Completed  12/14/2023 0017 by Sima Reyes RN  Outcome: Progressing     Problem: Skin/Tissue Integrity  Goal: Absence of new skin breakdown  Description: 1. Monitor for areas of redness and/or skin breakdown  2. Assess vascular access sites hourly  3. Every 4-6 hours minimum:  Change oxygen saturation probe site  4. Every 4-6 hours:  If on nasal continuous positive airway pressure, respiratory therapy assess nares and determine need for appliance change or resting period.   12/14/2023 1230 by Logan Gudino RN  Outcome: Completed  12/14/2023 0017 by Sima Reyes RN  Outcome: Progressing     Problem: Pain  Goal: Verbalizes/displays adequate comfort level or baseline comfort level  12/14/2023 1230 by Logan Gudino RN  Outcome: Completed  12/14/2023 0017 by Sima Reyes RN  Outcome: Progressing
Problem: Discharge Planning  Goal: Discharge to home or other facility with appropriate resources  Outcome: Progressing     Problem: Chronic Conditions and Co-morbidities  Goal: Patient's chronic conditions and co-morbidity symptoms are monitored and maintained or improved  Outcome: Progressing     Problem: Safety - Adult  Goal: Free from fall injury  Outcome: Progressing     Problem: Skin/Tissue Integrity  Goal: Absence of new skin breakdown  Description: 1. Monitor for areas of redness and/or skin breakdown  2. Assess vascular access sites hourly  3. Every 4-6 hours minimum:  Change oxygen saturation probe site  4. Every 4-6 hours:  If on nasal continuous positive airway pressure, respiratory therapy assess nares and determine need for appliance change or resting period.   Outcome: Progressing     Problem: Pain  Goal: Verbalizes/displays adequate comfort level or baseline comfort level  Outcome: Progressing
Problem: Discharge Planning  Goal: Discharge to home or other facility with appropriate resources  Outcome: Progressing     Problem: Chronic Conditions and Co-morbidities  Goal: Patient's chronic conditions and co-morbidity symptoms are monitored and maintained or improved  Outcome: Progressing     Problem: Safety - Adult  Goal: Free from fall injury  Outcome: Progressing     Problem: Skin/Tissue Integrity  Goal: Absence of new skin breakdown  Description: 1. Monitor for areas of redness and/or skin breakdown  2. Assess vascular access sites hourly  3. Every 4-6 hours minimum:  Change oxygen saturation probe site  4. Every 4-6 hours:  If on nasal continuous positive airway pressure, respiratory therapy assess nares and determine need for appliance change or resting period.   Outcome: Progressing     Problem: Pain  Goal: Verbalizes/displays adequate comfort level or baseline comfort level  Outcome: Progressing
Problem: Discharge Planning  Goal: Discharge to home or other facility with appropriate resources  Outcome: Progressing     Problem: Chronic Conditions and Co-morbidities  Goal: Patient's chronic conditions and co-morbidity symptoms are monitored and maintained or improved  Outcome: Progressing     Problem: Safety - Adult  Goal: Free from fall injury  Outcome: Progressing     Problem: Skin/Tissue Integrity  Goal: Absence of new skin breakdown  Outcome: Progressing     Problem: Pain  Goal: Verbalizes/displays adequate comfort level or baseline comfort level  Outcome: Progressing
(gastroesophageal reflux disease), Heart valve problem, Hemodialysis patient ([address]), History of spinal fracture, Hx of blood clots, Hyperlipidemia, Hypertension, MI (myocardial infarction) ([address]), Neuromuscular disorder ([address]), Numbness and tingling in left arm, Pneumonia, PONV (postoperative nausea and vomiting), Prolonged emergence from general anesthesia, Sleep apnea, Stroke ([address]), TIA (transient ischemic attack), and Unspecified diseases of blood and blood-forming organs. >>For CHF and Comorbidity documentation on Education Time and Topics, please see Education Tab    Progressive Mobility Assessment:  What is this patient's Current Level of Mobility?: Ambulatory- with Assistance  How was this patient Mobilized today?: Patient Refuses to Mobilize, ambulated  ft                 With Whom? Nurse and PCA                 Level of Difficulty/Assistance: 1x Assist     Pt resting in bed at this time on BiPAP. Pt denies shortness of breath. Pt with pitting lower extremity edema.      Patient and/or Family's stated Goal of Care this Admission: reduce shortness of breath, increase activity tolerance, better understand heart failure and disease management, be more comfortable, and reduce lower extremity edema prior to discharge        :

## 2023-12-14 NOTE — CARE COORDINATION
CASE MANAGEMENT DISCHARGE SUMMARY      Discharge to: Home    Precertification completed: [id number] Exemption Notification (HENS) completed: N/A    IMM given: (date) 12/8/23    New Durable Medical Equipment ordered/agency: N/A    Transportation:    Family/car: Private/Private auto     Confirmed discharge plan with: Patient will go home with [facility] for PT/OT. Patient has dialysis on M-W-F at the [facility]. He will return to his normal schedule     Patient: yes     Family:  yes ; patient will  review with her when she arrives to pick him up     Facility/Agency, name:  [facility] orders faxed:      RN, name: Kory Lazar    Note: Discharging nurse to complete CELINE, reconcile AVS, and place final copy with patient's discharge packet. RN to ensure that written prescriptions for  Level II medications are sent with patient to the facility as per protocol.     Johanna Wharton RN
CM update: LOS # 6; Call and referral sent to [facility] [address] needs; PT/OT and skilled nursing. Michael Pierce, NAVJOT    5 [facility] do not take [employer]. New referral sent to [facility]. Will follow. Michael Pierce RN
CM update; LOS # 5; Spoke to patient after he returned from dialysis. His plan is to go home with home services. Will need PT/OT evaluation to obtain those services. Patient goes to dialysis M-W-F at [facility] in [facility]. Will follow for evaluations. Nestor Wilcox RN
LOS 4. Care  managed by [facility]. Here w Resp Fail, ESRD. Currently on 4L- uses Bipap at night. Mandy Loots MWF. Await therapy evals for dispo. At last admit- pt DC to [facility]. Left there 12/5- back to [facility] 12/8. Call to [facility]- could accept pt back if he wishes. Discussed dispo w pt at bedside-stated preference for [address] vs SNF. Pt unsure if he currently has [address] or not. No agency preference. CM notes from last admit say he had at one point been referred to [facility], and is active w [facility] for 02 and CPAP. [facility] had been active in the past- but cannot accept back due to non compliance. Discussed w attending- agreeable to PT OT evals for dspo input. From home w spouse. CM following.  Deanne Ludwig RN
wife later to confirm information. Pt has been getting dialysis for about 5 yrs,, he goes to [facility]. He does not drive, his wife does. The pt voiced concern about his wife being sick sometimes an can't take him. He has used [place] and was disgusted with their unreliability. I suggested he look into [employer]. He has been getting Bipap at night and on O2 NC now. Leonel Gann His wife does not want him to go back to [facility], she said she would rather him come home and get [address]. No prefs. The Plan for Transition of Care is related to the following treatment goals of Respiratory failure ([address]) [J96.90]  Hyponatremia [E87.1]  Hypoxia [R09.02]  Dyspnea, unspecified type [R06.00]  Pneumonia due to infectious organism, unspecified laterality, unspecified part of lung [J18.9]  Acute on chronic congestive heart failure, unspecified heart failure type ([address]) [O26.2]    IF APPLICABLE: The Patient and/or patient representative Сергей Babcock and his family were provided with a choice of provider and agrees with the discharge plan. Freedom of choice list with basic dialogue that supports the patient's individualized plan of care/goals and shares the quality data associated with the providers was provided to:     Patient Representative Name:       The Patient and/or Patient Representative Agree with the Discharge Plan?       Angelica Gonzalez RN  Case Management Department

## 2023-12-14 NOTE — DISCHARGE INSTR - COC
SECTION    Prognosis: Good    Condition at Discharge: Stable    Rehab Potential (if transferring to Rehab): Good    Recommended Labs or Other Treatments After Discharge: PT/OT    Physician Certification: I certify the above information and transfer of Luis Felipe Yao  is necessary for the continuing treatment of the diagnosis listed and that he requires Home Care for less 30 days.      Update Admission H&P: No change in H&P    PHYSICIAN SIGNATURE:  Electronically signed by Tana Duarte MD on 12/14/23 at 11:28 AM EST

## 2023-12-14 NOTE — DISCHARGE SUMMARY
appointments: PCP and nephrology  Primary care physician: JAY Nur CNP within 2 weeks  Diet: regular diet and renal diet   Activity: activity as tolerated  Disposition: Discharged to:   [x]Home, []C, []SNF, []Acute Rehab, []Hospice   Condition on discharge: Stable  Labs and Tests to be Followed up as an outpatient by PCP or Specialist:     Discharge Medications:        Medication List        CONTINUE taking these medications      calcium acetate 667 MG Caps capsule  Commonly known as: PHOSLO  TAKE 1 CAPSULE BY MOUTH THREE TIMES DAILY WITH MEALS     calcium carbonate 500 MG chewable tablet  Commonly known as: TUMS     clopidogrel 75 MG tablet  Commonly known as: PLAVIX  TAKE 1 TABLET BY MOUTH DAILY     DULoxetine 60 MG extended release capsule  Commonly known as: CYMBALTA  Take 1 capsule by mouth daily     insulin glargine 100 UNIT/ML injection vial  Commonly known as: LANTUS  Inject 10 Units into the skin nightly     ipratropium 0.5 mg-albuterol 2.5 mg 0.5-2.5 (3) MG/3ML Soln nebulizer solution  Commonly known as: DuoNeb  Inhale 3 mLs into the lungs every 6 hours as needed for Shortness of Breath     isosorbide dinitrate 20 MG tablet  Commonly known as: ISORDIL  Take 1 tablet by mouth 3 times daily     metoprolol succinate 100 MG extended release tablet  Commonly known as: TOPROL XL  Take 1 tablet by mouth in the morning and at bedtime     midodrine 5 MG tablet  Commonly known as: PROAMATINE     pantoprazole 40 MG tablet  Commonly known as: PROTONIX  TAKE 1 TABLET BY MOUTH EVERY MORNING BEFORE BREAKFAST     pravastatin 40 MG tablet  Commonly known as: PRAVACHOL  TAKE 1 TABLET BY MOUTH DAILY     QUEtiapine 50 MG tablet  Commonly known as: SEROQUEL  TAKE 1 TABLET BY MOUTH EVERY EVENING     traZODone 50 MG tablet  Commonly known as: DESYREL     Virt-Caps 1 MG Caps  TAKE 1 CAPSULE BY MOUTH EVERY DAY     vitamin D 1.25 MG (49937 UT) Caps capsule  Commonly known as: ERGOCALCIFEROL            STOP

## 2023-12-15 ENCOUNTER — FOLLOWUP TELEPHONE ENCOUNTER (OUTPATIENT)
Dept: TELEMETRY | Age: 53
End: 2023-12-15

## 2023-12-15 ENCOUNTER — CARE COORDINATION (OUTPATIENT)
Dept: CASE MANAGEMENT | Age: 53
End: 2023-12-15

## 2023-12-15 NOTE — TELEPHONE ENCOUNTER
3rd  and final Attempt; No Answer- Left HIPAA compliant voicemail with Non-Urgent Heart Failure Resource Line number for call back.      Ty Mina RN

## 2023-12-15 NOTE — TELEPHONE ENCOUNTER
2nd Attempt; No Answer- Left HIPAA compliant voicemail with Non-Urgent Heart Failure Resource Line number for call back.     Elizabeth Wiley RN

## 2023-12-15 NOTE — TELEPHONE ENCOUNTER
1st Attempt; No Answer- Left HIPAA compliant voicemail with Non-Urgent Heart Failure Resource Line number for call back.      Tim Smith RN

## 2023-12-15 NOTE — CARE COORDINATION
Care Transitions Outreach Attempt    Call within 2 business days of discharge: Yes   Attempted to reach patient for transitions of care follow up. Unable to reach patient. LVM. CTN contacted [facility] and spoke with Randle who verified HC orders were received and in process of setting of soc. Patient: Luis Do Patient : 1970 MRN: [id number]    Last Discharge Facility       Date Complaint Diagnosis Description Type Department Provider    23 Shortness of Breath Hypoxia . .. ED to Hosp-Admission (Discharged) (ADMITTED) Se Jj MD; Leslie Thompson. .. Was this an external facility discharge? No Discharge Facility Name: n.a    Noted following upcoming appointments from discharge chart review:   [id number] [facility],[address] follow up appointment(s): No future appointments. Non-[facility]  follow up appointment(s):  Jim RALPH, RN, [facility]  Care Transition Nurse  [phone] mobile

## 2023-12-23 ENCOUNTER — HOSPITAL ENCOUNTER (EMERGENCY)
Age: 53
Discharge: HOME | End: 2023-12-24
Attending: EMERGENCY MEDICINE
Payer: MEDICARE

## 2023-12-23 ENCOUNTER — APPOINTMENT (OUTPATIENT)
Dept: GENERAL RADIOLOGY | Age: 53
End: 2023-12-23
Payer: MEDICARE

## 2023-12-23 DIAGNOSIS — J44.1 COPD EXACERBATION (HCC): Primary | ICD-10-CM

## 2023-12-23 DIAGNOSIS — J81.1 CHRONIC PULMONARY EDEMA: ICD-10-CM

## 2023-12-23 PROCEDURE — 96374 THER/PROPH/DIAG INJ IV PUSH: CPT

## 2023-12-23 PROCEDURE — 99285 EMERGENCY DEPT VISIT HI MDM: CPT

## 2023-12-23 PROCEDURE — 6370000000 HC RX 637 (ALT 250 FOR IP): Performed by: EMERGENCY MEDICINE

## 2023-12-23 PROCEDURE — 6360000002 HC RX W HCPCS: Performed by: EMERGENCY MEDICINE

## 2023-12-23 PROCEDURE — 71046 X-RAY EXAM CHEST 2 VIEWS: CPT

## 2023-12-23 PROCEDURE — 93005 ELECTROCARDIOGRAM TRACING: CPT | Performed by: EMERGENCY MEDICINE

## 2023-12-23 RX ORDER — IPRATROPIUM BROMIDE AND ALBUTEROL SULFATE 2.5; .5 MG/3ML; MG/3ML
1 SOLUTION RESPIRATORY (INHALATION) ONCE
Status: COMPLETED | OUTPATIENT
Start: 2023-12-23 | End: 2023-12-23

## 2023-12-23 RX ORDER — NITROGLYCERIN 0.4 MG/1
0.4 TABLET SUBLINGUAL ONCE
Status: COMPLETED | OUTPATIENT
Start: 2023-12-23 | End: 2023-12-23

## 2023-12-23 RX ORDER — METHYLPREDNISOLONE SODIUM SUCCINATE 40 MG/ML
40 INJECTION, POWDER, LYOPHILIZED, FOR SOLUTION INTRAMUSCULAR; INTRAVENOUS ONCE
Status: COMPLETED | OUTPATIENT
Start: 2023-12-23 | End: 2023-12-23

## 2023-12-23 RX ADMIN — METHYLPREDNISOLONE SODIUM SUCCINATE 40 MG: 40 INJECTION INTRAMUSCULAR; INTRAVENOUS at 23:24

## 2023-12-23 RX ADMIN — IPRATROPIUM BROMIDE AND ALBUTEROL SULFATE 1 DOSE: 2.5; .5 SOLUTION RESPIRATORY (INHALATION) at 23:24

## 2023-12-23 RX ADMIN — NITROGLYCERIN 0.4 MG: 0.4 TABLET, ORALLY DISINTEGRATING SUBLINGUAL at 23:17

## 2023-12-24 ENCOUNTER — HOSPITAL ENCOUNTER (EMERGENCY)
Age: 53
Discharge: HOME | End: 2023-12-24
Attending: STUDENT IN AN ORGANIZED HEALTH CARE EDUCATION/TRAINING PROGRAM
Payer: MEDICARE

## 2023-12-24 ENCOUNTER — APPOINTMENT (OUTPATIENT)
Dept: GENERAL RADIOLOGY | Age: 53
End: 2023-12-24
Payer: MEDICARE

## 2023-12-24 VITALS
SYSTOLIC BLOOD PRESSURE: 192 MMHG | WEIGHT: 211 LBS | HEIGHT: 69 IN | OXYGEN SATURATION: 100 % | TEMPERATURE: 97.8 F | RESPIRATION RATE: 19 BRPM | DIASTOLIC BLOOD PRESSURE: 97 MMHG | BODY MASS INDEX: 31.25 KG/M2 | HEART RATE: 96 BPM

## 2023-12-24 VITALS
WEIGHT: 211.86 LBS | OXYGEN SATURATION: 99 % | HEIGHT: 68 IN | TEMPERATURE: 97.8 F | BODY MASS INDEX: 32.11 KG/M2 | SYSTOLIC BLOOD PRESSURE: 122 MMHG | RESPIRATION RATE: 17 BRPM | DIASTOLIC BLOOD PRESSURE: 100 MMHG | HEART RATE: 99 BPM

## 2023-12-24 DIAGNOSIS — R06.00 DYSPNEA, UNSPECIFIED TYPE: Primary | ICD-10-CM

## 2023-12-24 LAB
ALBUMIN SERPL-MCNC: 3.7 G/DL (ref 3.4–5)
ALBUMIN SERPL-MCNC: 4.1 G/DL (ref 3.4–5)
ALBUMIN/GLOB SERPL: 1.2 {RATIO} (ref 1.1–2.2)
ALBUMIN/GLOB SERPL: 1.2 {RATIO} (ref 1.1–2.2)
ALP SERPL-CCNC: 122 U/L (ref 40–129)
ALP SERPL-CCNC: 135 U/L (ref 40–129)
ALT SERPL-CCNC: 12 U/L (ref 10–40)
ALT SERPL-CCNC: 8 U/L (ref 10–40)
ANION GAP SERPL CALCULATED.3IONS-SCNC: 17 MMOL/L (ref 3–16)
ANION GAP SERPL CALCULATED.3IONS-SCNC: 19 MMOL/L (ref 3–16)
AST SERPL-CCNC: 11 U/L (ref 15–37)
AST SERPL-CCNC: 20 U/L (ref 15–37)
BASE EXCESS BLDV CALC-SCNC: -2.8 MMOL/L (ref -3–3)
BASE EXCESS BLDV CALC-SCNC: 2.1 MMOL/L (ref -3–3)
BASOPHILS # BLD: 0 K/UL (ref 0–0.2)
BASOPHILS # BLD: 0.1 K/UL (ref 0–0.2)
BASOPHILS NFR BLD: 0.4 %
BASOPHILS NFR BLD: 0.8 %
BILIRUB SERPL-MCNC: <0.2 MG/DL (ref 0–1)
BILIRUB SERPL-MCNC: <0.2 MG/DL (ref 0–1)
BUN SERPL-MCNC: 81 MG/DL (ref 7–20)
BUN SERPL-MCNC: 87 MG/DL (ref 7–20)
CALCIUM SERPL-MCNC: 9.5 MG/DL (ref 8.3–10.6)
CALCIUM SERPL-MCNC: 9.9 MG/DL (ref 8.3–10.6)
CHLORIDE SERPL-SCNC: 93 MMOL/L (ref 99–110)
CHLORIDE SERPL-SCNC: 99 MMOL/L (ref 99–110)
CO2 BLDV-SCNC: 24 MMOL/L
CO2 BLDV-SCNC: 28 MMOL/L
CO2 SERPL-SCNC: 23 MMOL/L (ref 21–32)
CO2 SERPL-SCNC: 24 MMOL/L (ref 21–32)
COHGB MFR BLDV: 3.2 % (ref 0–1.5)
COHGB MFR BLDV: 4 % (ref 0–1.5)
CREAT SERPL-MCNC: 8.1 MG/DL (ref 0.9–1.3)
CREAT SERPL-MCNC: 8.4 MG/DL (ref 0.9–1.3)
DEPRECATED RDW RBC AUTO: 17.2 % (ref 12.4–15.4)
DEPRECATED RDW RBC AUTO: 17.3 % (ref 12.4–15.4)
EKG ATRIAL RATE: 100 BPM
EKG ATRIAL RATE: 93 BPM
EKG DIAGNOSIS: NORMAL
EKG DIAGNOSIS: NORMAL
EKG P AXIS: 75 DEGREES
EKG P AXIS: 79 DEGREES
EKG P-R INTERVAL: 158 MS
EKG P-R INTERVAL: 172 MS
EKG Q-T INTERVAL: 364 MS
EKG Q-T INTERVAL: 364 MS
EKG QRS DURATION: 92 MS
EKG QRS DURATION: 98 MS
EKG QTC CALCULATION (BAZETT): 452 MS
EKG QTC CALCULATION (BAZETT): 469 MS
EKG R AXIS: 2 DEGREES
EKG R AXIS: 6 DEGREES
EKG T AXIS: 100 DEGREES
EKG T AXIS: 150 DEGREES
EKG VENTRICULAR RATE: 100 BPM
EKG VENTRICULAR RATE: 93 BPM
EOSINOPHIL # BLD: 0 K/UL (ref 0–0.6)
EOSINOPHIL # BLD: 0.2 K/UL (ref 0–0.6)
EOSINOPHIL NFR BLD: 0.2 %
EOSINOPHIL NFR BLD: 3.2 %
GFR SERPLBLD CREATININE-BSD FMLA CKD-EPI: 7 ML/MIN/{1.73_M2}
GFR SERPLBLD CREATININE-BSD FMLA CKD-EPI: 7 ML/MIN/{1.73_M2}
GLUCOSE SERPL-MCNC: 128 MG/DL (ref 70–99)
GLUCOSE SERPL-MCNC: 411 MG/DL (ref 70–99)
HCO3 BLDV-SCNC: 22.7 MMOL/L (ref 23–29)
HCO3 BLDV-SCNC: 26.3 MMOL/L (ref 23–29)
HCT VFR BLD AUTO: 25.4 % (ref 40.5–52.5)
HCT VFR BLD AUTO: 27.8 % (ref 40.5–52.5)
HGB BLD-MCNC: 8.3 G/DL (ref 13.5–17.5)
HGB BLD-MCNC: 8.9 G/DL (ref 13.5–17.5)
LYMPHOCYTES # BLD: 0.2 K/UL (ref 1–5.1)
LYMPHOCYTES # BLD: 0.9 K/UL (ref 1–5.1)
LYMPHOCYTES NFR BLD: 11.3 %
LYMPHOCYTES NFR BLD: 2.6 %
MCH RBC QN AUTO: 29.2 PG (ref 26–34)
MCH RBC QN AUTO: 29.5 PG (ref 26–34)
MCHC RBC AUTO-ENTMCNC: 31.9 G/DL (ref 31–36)
MCHC RBC AUTO-ENTMCNC: 32.6 G/DL (ref 31–36)
MCV RBC AUTO: 90.3 FL (ref 80–100)
MCV RBC AUTO: 91.5 FL (ref 80–100)
METHGB MFR BLDV: 0.3 %
METHGB MFR BLDV: 0.3 %
MONOCYTES # BLD: 0.1 K/UL (ref 0–1.3)
MONOCYTES # BLD: 0.6 K/UL (ref 0–1.3)
MONOCYTES NFR BLD: 1.1 %
MONOCYTES NFR BLD: 8.2 %
NEUTROPHILS # BLD: 5.9 K/UL (ref 1.7–7.7)
NEUTROPHILS # BLD: 8 K/UL (ref 1.7–7.7)
NEUTROPHILS NFR BLD: 76.5 %
NEUTROPHILS NFR BLD: 95.7 %
NT-PROBNP SERPL-MCNC: ABNORMAL PG/ML (ref 0–124)
O2 THERAPY: ABNORMAL
O2 THERAPY: ABNORMAL
PCO2 BLDV: 39.3 MMHG (ref 40–50)
PCO2 BLDV: 42.2 MMHG (ref 40–50)
PH BLDV: 7.35 [PH] (ref 7.35–7.45)
PH BLDV: 7.44 [PH] (ref 7.35–7.45)
PLATELET # BLD AUTO: 203 K/UL (ref 135–450)
PLATELET # BLD AUTO: 250 K/UL (ref 135–450)
PMV BLD AUTO: 8.4 FL (ref 5–10.5)
PMV BLD AUTO: 9.2 FL (ref 5–10.5)
PO2 BLDV: 45.9 MMHG (ref 25–40)
PO2 BLDV: 56.4 MMHG (ref 25–40)
POTASSIUM SERPL-SCNC: 4.8 MMOL/L (ref 3.5–5.1)
POTASSIUM SERPL-SCNC: 5.1 MMOL/L (ref 3.5–5.1)
POTASSIUM SERPL-SCNC: 6.1 MMOL/L (ref 3.5–5.1)
PROT SERPL-MCNC: 6.7 G/DL (ref 6.4–8.2)
PROT SERPL-MCNC: 7.6 G/DL (ref 6.4–8.2)
RBC # BLD AUTO: 2.81 M/UL (ref 4.2–5.9)
RBC # BLD AUTO: 3.04 M/UL (ref 4.2–5.9)
SAO2 % BLDV: 76 %
SAO2 % BLDV: 88 %
SODIUM SERPL-SCNC: 135 MMOL/L (ref 136–145)
SODIUM SERPL-SCNC: 140 MMOL/L (ref 136–145)
TROPONIN, HIGH SENSITIVITY: 111 NG/L (ref 0–22)
TROPONIN, HIGH SENSITIVITY: 98 NG/L (ref 0–22)
WBC # BLD AUTO: 7.7 K/UL (ref 4–11)
WBC # BLD AUTO: 8.3 K/UL (ref 4–11)

## 2023-12-24 PROCEDURE — 6370000000 HC RX 637 (ALT 250 FOR IP): Performed by: STUDENT IN AN ORGANIZED HEALTH CARE EDUCATION/TRAINING PROGRAM

## 2023-12-24 PROCEDURE — 85025 COMPLETE CBC W/AUTO DIFF WBC: CPT

## 2023-12-24 PROCEDURE — 6360000002 HC RX W HCPCS: Performed by: EMERGENCY MEDICINE

## 2023-12-24 PROCEDURE — 83880 ASSAY OF NATRIURETIC PEPTIDE: CPT

## 2023-12-24 PROCEDURE — 93010 ELECTROCARDIOGRAM REPORT: CPT | Performed by: INTERNAL MEDICINE

## 2023-12-24 PROCEDURE — 84484 ASSAY OF TROPONIN QUANT: CPT

## 2023-12-24 PROCEDURE — 99285 EMERGENCY DEPT VISIT HI MDM: CPT

## 2023-12-24 PROCEDURE — 82803 BLOOD GASES ANY COMBINATION: CPT

## 2023-12-24 PROCEDURE — 2700000000 HC OXYGEN THERAPY PER DAY

## 2023-12-24 PROCEDURE — 96372 THER/PROPH/DIAG INJ SC/IM: CPT

## 2023-12-24 PROCEDURE — 84132 ASSAY OF SERUM POTASSIUM: CPT

## 2023-12-24 PROCEDURE — 94640 AIRWAY INHALATION TREATMENT: CPT

## 2023-12-24 PROCEDURE — 93005 ELECTROCARDIOGRAM TRACING: CPT | Performed by: STUDENT IN AN ORGANIZED HEALTH CARE EDUCATION/TRAINING PROGRAM

## 2023-12-24 PROCEDURE — 6370000000 HC RX 637 (ALT 250 FOR IP): Performed by: EMERGENCY MEDICINE

## 2023-12-24 PROCEDURE — 80053 COMPREHEN METABOLIC PANEL: CPT

## 2023-12-24 PROCEDURE — 71045 X-RAY EXAM CHEST 1 VIEW: CPT

## 2023-12-24 PROCEDURE — 94761 N-INVAS EAR/PLS OXIMETRY MLT: CPT

## 2023-12-24 RX ORDER — LABETALOL HYDROCHLORIDE 5 MG/ML
10 INJECTION, SOLUTION INTRAVENOUS ONCE
Status: DISCONTINUED | OUTPATIENT
Start: 2023-12-24 | End: 2023-12-24

## 2023-12-24 RX ORDER — IPRATROPIUM BROMIDE AND ALBUTEROL SULFATE 2.5; .5 MG/3ML; MG/3ML
1 SOLUTION RESPIRATORY (INHALATION) ONCE
Status: COMPLETED | OUTPATIENT
Start: 2023-12-24 | End: 2023-12-24

## 2023-12-24 RX ORDER — LABETALOL 100 MG/1
50 TABLET, FILM COATED ORAL ONCE
Status: COMPLETED | OUTPATIENT
Start: 2023-12-24 | End: 2023-12-24

## 2023-12-24 RX ORDER — FUROSEMIDE 10 MG/ML
40 INJECTION INTRAMUSCULAR; INTRAVENOUS ONCE
Status: COMPLETED | OUTPATIENT
Start: 2023-12-24 | End: 2023-12-24

## 2023-12-24 RX ADMIN — LABETALOL HYDROCHLORIDE 50 MG: 100 TABLET, FILM COATED ORAL at 02:15

## 2023-12-24 RX ADMIN — IPRATROPIUM BROMIDE AND ALBUTEROL SULFATE 1 DOSE: 2.5; .5 SOLUTION RESPIRATORY (INHALATION) at 07:27

## 2023-12-24 RX ADMIN — FUROSEMIDE 40 MG: 10 INJECTION, SOLUTION INTRAMUSCULAR; INTRAVENOUS at 02:04

## 2023-12-24 NOTE — ED PROVIDER NOTES
Emergency Department Attending Provider Note  Location: [address] [place] [address]  ED  12/23/2023     Patient Identification  Vlad Willingham is a 52 y.o. male      HPI:Igor Castro was evaluated in the Emergency Department for shortness of breath. Patient has been short of breath for several days. Has an occasional cough. He did have some chest pain. Although initial history and physical exam information was obtained by the resident physician (who also dictated a record of this visit), I personally saw the patient and performed a substantive portion of the visit including all aspects of the medical decision making. PHYSICAL EXAM:  Chronically ill-appearing adult male in no acute distress, by the time I saw the patient he had already received a DuoNeb and I do not appreciate any significant wheezing, perhaps mild in the left anterior lung field. No rales. Normal heart sounds. He has dialysis access catheter in the left upper chest.  Abdomen is protuberant. Nontender. EKG Interpretation  Twelve-lead EKG as read and interpreted by myself shows normal sinus rhythm at a rate of 93 beats minute, NM interval QRS QT is normal.  Normal axis no acute ischemic findings. No significant change when compared to prior EKG December 7, 2023. Patient seen and evaluated. Relevant records reviewed. MDM:  Adult male who comes in for shortness of breath. Patient did mention that he had chest pain on arrival but the patient is asymptomatic for chest pain now. He is placed on cardiac blood pressure and pulse oximetry monitoring. He is on 4 L of oxygen at home and patient is placed on 2 L here and is saturating 99% to 100%. Twelve-lead EKG shows no acute ischemic changes. Chest x-ray is ordered which showed worsening pulmonary edema however I did not appreciate any significant rales. Nursing had difficulty obtaining a peripheral IV and I was asked to place an ultrasound-guided line.     Procedure note:
Relation Age of Onset    Diabetes Mother     Heart Disease Father     Kidney Disease Sister         stage 4-kidney failure    Cancer Sister     Heart Disease Sister     Obesity Sister     Cancer Sister     Heart Disease Sister     Obesity Sister     Alcohol Abuse Brother        SOCIAL HISTORY  Social History     Tobacco Use    Smoking status: Former     Current packs/day: 0.00     Average packs/day: 0.5 packs/day for 33.0 years (16.5 ttl pk-yrs)     Types: Cigarettes     Start date: 4/26/1987     Quit date: 4/26/2020     Years since quitting: 3.6    Smokeless tobacco: Never    Tobacco comments:     States quit December 2021   Vaping Use    Vaping Use: Never used   Substance Use Topics    Alcohol use: Not Currently     Alcohol/week: 0.0 standard drinks of alcohol     Comment: occ    Drug use: No       SCREENINGS    [place] Coma Scale  Eye Opening: Spontaneous  Best Verbal Response: Oriented  Best Motor Response: Obeys commands  [place] Coma Scale Score: 15       CIWA Assessment  BP: (!) 122/100  Pulse: 99           REVIEW OF SYSTEMS:    Review of Systems  Positives and Pertinent negatives as per HPI.    _____________________________________      PHYSICAL EXAM:     Vitals:    12/24/23 0110 12/24/23 0115 12/24/23 0130 12/24/23 0145   BP: 103/83 (!) 195/96 (!) 183/88 (!) 122/100   Pulse: 99 95 96 99   Resp: 20 22 20 17   Temp:       TempSrc:       SpO2: 98% 98% 99% 99%   Weight:       Height:           Physical Exam  Constitutional:       General: He is not in acute distress. Appearance: He is obese. He is not toxic-appearing. HENT:      Head: Normocephalic and atraumatic. Eyes:      Pupils: Pupils are equal, round, and reactive to light. Cardiovascular:      Rate and Rhythm: Normal rate and regular rhythm. Pulses: Normal pulses. Heart sounds: No murmur heard. Pulmonary:      Breath sounds: Examination of the right-upper field reveals wheezing. Examination of the left-upper field reveals wheezing.

## 2023-12-24 NOTE — ED NOTES
Attempted to straight stick patient for repeat potassium level with no success. Patient has poor venous access, lab contacted to have phlebotomy come down to draw potassium level.

## 2023-12-24 NOTE — DISCHARGE INSTRUCTIONS
You were evaluated in the emergency department for shortness of breath. Assessments and testing completed during your visit were reassuring and at this time there is no indication for further testing, treatment or admission to the hospital. Given this it is appropriate to discharge you from the emergency department. At the time of discharge we discussed the following: You must attend dialysis today in support of your overall health. Please note that sometimes it is difficult to diagnose a medical condition early in the disease process before the disease is fully manifest. Because of this, should you develop any new or worsening symptoms, you may return at any time to the emergency department for another evaluation. If available you are also recommended to review this visit with your primary care physician or other medical provider in the next 7 days. Thank you for allowing us to care for you today.

## 2023-12-26 ENCOUNTER — HOSPITAL ENCOUNTER (EMERGENCY)
Age: 53
Discharge: HOME | End: 2023-12-26
Attending: EMERGENCY MEDICINE
Payer: MEDICARE

## 2023-12-26 VITALS
TEMPERATURE: 96.9 F | HEART RATE: 100 BPM | OXYGEN SATURATION: 100 % | SYSTOLIC BLOOD PRESSURE: 175 MMHG | DIASTOLIC BLOOD PRESSURE: 120 MMHG | RESPIRATION RATE: 13 BRPM

## 2023-12-26 DIAGNOSIS — R06.00 DYSPNEA AND RESPIRATORY ABNORMALITIES: Primary | ICD-10-CM

## 2023-12-26 DIAGNOSIS — R06.89 DYSPNEA AND RESPIRATORY ABNORMALITIES: Primary | ICD-10-CM

## 2023-12-26 LAB
EKG ATRIAL RATE: 97 BPM
EKG DIAGNOSIS: NORMAL
EKG P AXIS: 90 DEGREES
EKG P-R INTERVAL: 160 MS
EKG Q-T INTERVAL: 368 MS
EKG QRS DURATION: 96 MS
EKG QTC CALCULATION (BAZETT): 467 MS
EKG R AXIS: 10 DEGREES
EKG T AXIS: 103 DEGREES
EKG VENTRICULAR RATE: 97 BPM

## 2023-12-26 PROCEDURE — 94640 AIRWAY INHALATION TREATMENT: CPT

## 2023-12-26 PROCEDURE — 93005 ELECTROCARDIOGRAM TRACING: CPT | Performed by: EMERGENCY MEDICINE

## 2023-12-26 PROCEDURE — 93010 ELECTROCARDIOGRAM REPORT: CPT | Performed by: INTERNAL MEDICINE

## 2023-12-26 PROCEDURE — 2700000000 HC OXYGEN THERAPY PER DAY

## 2023-12-26 PROCEDURE — 99285 EMERGENCY DEPT VISIT HI MDM: CPT

## 2023-12-26 PROCEDURE — 94660 CPAP INITIATION&MGMT: CPT

## 2023-12-26 PROCEDURE — 94761 N-INVAS EAR/PLS OXIMETRY MLT: CPT

## 2023-12-26 PROCEDURE — 6370000000 HC RX 637 (ALT 250 FOR IP): Performed by: EMERGENCY MEDICINE

## 2023-12-26 RX ORDER — IPRATROPIUM BROMIDE AND ALBUTEROL SULFATE 2.5; .5 MG/3ML; MG/3ML
1 SOLUTION RESPIRATORY (INHALATION) ONCE
Status: COMPLETED | OUTPATIENT
Start: 2023-12-26 | End: 2023-12-26

## 2023-12-26 RX ADMIN — IPRATROPIUM BROMIDE AND ALBUTEROL SULFATE 1 DOSE: 2.5; .5 SOLUTION RESPIRATORY (INHALATION) at 03:56

## 2023-12-26 NOTE — PROGRESS NOTES
12/26/23 0300   NIV Type   NIV Started/Stopped On   Equipment Type V60   Mode Bilevel   Mask Type Full face mask   Mask Size Large   Bonnet size Large   Assessment   Respirations 23   SpO2 100 %   Comfort Level Good   Using Accessory Muscles No   Mask Compliance Good   Skin Assessment Clean, dry, & intact   Skin Protection for O2 Device Yes   Location Nose   Intervention(s) Skin Barrier   Breath Sounds   Right Upper Lobe Diminished   Right Middle Lobe Diminished   Right Lower Lobe Fine crackles   Left Upper Lobe Diminished   Left Lower Lobe Fine crackles   Settings/Measurements   CPAP/EPAP 16 cmH2O   Vt (Set, mL) 8 mL   Vt (Measured) 481 mL   Rate Ordered 12   FiO2  35 %   Minute Volume (L/min) 14.6 Liters   Mask Leak (lpm) 14 lpm   Patient's Home Machine No   Alarm Settings   Alarms On Y

## 2023-12-26 NOTE — ED PROVIDER NOTES
[address] [facility]  ED     Pt Name: Vlad Willingham   MRN: [id number]   [address] 1970   Date of evaluation: 12/26/2023   Provider: Kalani Kohler MD   PCP: JAY Willard CNP   Note Started: 6:31 AM EST 12/26/23     CHIEF COMPLAINT     Chief Complaint   Patient presents with    Shortness of Breath     This morning accompanied by mild chest pain. HISTORY OF PRESENT ILLNESS:          Vlad Willingham is a 52 y.o. male who presents with a chief complaint of shortness of breath. The patient states that he is always short of breath. Chief complaint lists chest pain but the patient did not report any chest pain with me. When asked about his use of BiPAP the patient states he has not been using his BiPAP because he does not have all the parts for his BiPAP. Patient states he is just tired of being short of breath. Patient has had no other acute complaints. He has not missed dialysis in fact he was dialyzed yesterday. Nursing Notes were all reviewed and agreed with or any disagreements were addressed in the HPI.     ROS: Positives and Pertinent negatives as per HPI.     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), CAD (coronary artery disease), Cardiomyopathy ([address]) (04/19/2019), CHF (congestive heart failure) ([address]), Chronic pain, COPD (chronic obstructive pulmonary disease) ([address]), Depression, Diabetes mellitus ([address]), Difficult intravenous access, Emphysema of lung ([address]), ESRD (end stage renal disease) on dialysis ([address]), Fear of needles, Gastric ulcer, GERD (gastroesophageal reflux disease), Heart valve problem, Hemodialysis patient ([address]), History of spinal fracture, blood clots, Hyperlipidemia, Hypertension, MI (myocardial infarction) ([address]) (2019), Neuromuscular disorder ([address]), Numbness and tingling

## 2023-12-29 ENCOUNTER — APPOINTMENT (OUTPATIENT)
Dept: GENERAL RADIOLOGY | Age: 53
End: 2023-12-29
Payer: MEDICARE

## 2023-12-29 ENCOUNTER — HOSPITAL ENCOUNTER (EMERGENCY)
Age: 53
Discharge: HOME | End: 2023-12-30
Attending: STUDENT IN AN ORGANIZED HEALTH CARE EDUCATION/TRAINING PROGRAM
Payer: MEDICARE

## 2023-12-29 ENCOUNTER — HOSPITAL ENCOUNTER (EMERGENCY)
Age: 53
Discharge: HOME | End: 2023-12-29
Payer: MEDICARE

## 2023-12-29 VITALS
SYSTOLIC BLOOD PRESSURE: 153 MMHG | OXYGEN SATURATION: 100 % | DIASTOLIC BLOOD PRESSURE: 72 MMHG | HEART RATE: 98 BPM | RESPIRATION RATE: 24 BRPM | TEMPERATURE: 98 F

## 2023-12-29 DIAGNOSIS — N18.6 STAGE 5 CHRONIC KIDNEY DISEASE ON CHRONIC DIALYSIS (HCC): Primary | ICD-10-CM

## 2023-12-29 DIAGNOSIS — Z99.2 STAGE 5 CHRONIC KIDNEY DISEASE ON CHRONIC DIALYSIS (HCC): Primary | ICD-10-CM

## 2023-12-29 DIAGNOSIS — Z91.148 NONCOMPLIANCE WITH MEDICATION REGIMEN: ICD-10-CM

## 2023-12-29 DIAGNOSIS — R06.02 SHORTNESS OF BREATH: ICD-10-CM

## 2023-12-29 DIAGNOSIS — R06.00 DYSPNEA, UNSPECIFIED TYPE: Primary | ICD-10-CM

## 2023-12-29 LAB
ALBUMIN SERPL-MCNC: 4.2 G/DL (ref 3.4–5)
ALBUMIN SERPL-MCNC: 4.3 G/DL (ref 3.4–5)
ALBUMIN/GLOB SERPL: 1.2 {RATIO} (ref 1.1–2.2)
ALBUMIN/GLOB SERPL: 1.4 {RATIO} (ref 1.1–2.2)
ALP SERPL-CCNC: 138 U/L (ref 40–129)
ALP SERPL-CCNC: 140 U/L (ref 40–129)
ALT SERPL-CCNC: 10 U/L (ref 10–40)
ALT SERPL-CCNC: 11 U/L (ref 10–40)
ANION GAP SERPL CALCULATED.3IONS-SCNC: 16 MMOL/L (ref 3–16)
ANION GAP SERPL CALCULATED.3IONS-SCNC: 16 MMOL/L (ref 3–16)
AST SERPL-CCNC: 11 U/L (ref 15–37)
AST SERPL-CCNC: 9 U/L (ref 15–37)
BASE EXCESS BLDV CALC-SCNC: 0.3 MMOL/L (ref -3–3)
BASOPHILS # BLD: 0 K/UL (ref 0–0.2)
BASOPHILS # BLD: 0.1 K/UL (ref 0–0.2)
BASOPHILS NFR BLD: 0.5 %
BASOPHILS NFR BLD: 0.9 %
BILIRUB SERPL-MCNC: <0.2 MG/DL (ref 0–1)
BILIRUB SERPL-MCNC: <0.2 MG/DL (ref 0–1)
BUN SERPL-MCNC: 60 MG/DL (ref 7–20)
BUN SERPL-MCNC: 66 MG/DL (ref 7–20)
CALCIUM SERPL-MCNC: 8.7 MG/DL (ref 8.3–10.6)
CALCIUM SERPL-MCNC: 8.9 MG/DL (ref 8.3–10.6)
CHLORIDE SERPL-SCNC: 92 MMOL/L (ref 99–110)
CHLORIDE SERPL-SCNC: 93 MMOL/L (ref 99–110)
CO2 BLDV-SCNC: 26 MMOL/L
CO2 SERPL-SCNC: 26 MMOL/L (ref 21–32)
CO2 SERPL-SCNC: 27 MMOL/L (ref 21–32)
COHGB MFR BLDV: 2.7 % (ref 0–1.5)
CREAT SERPL-MCNC: 7 MG/DL (ref 0.9–1.3)
CREAT SERPL-MCNC: 7.3 MG/DL (ref 0.9–1.3)
DEPRECATED RDW RBC AUTO: 16.8 % (ref 12.4–15.4)
DEPRECATED RDW RBC AUTO: 17.5 % (ref 12.4–15.4)
EKG ATRIAL RATE: 104 BPM
EKG DIAGNOSIS: NORMAL
EKG P AXIS: 89 DEGREES
EKG P-R INTERVAL: 192 MS
EKG Q-T INTERVAL: 354 MS
EKG QRS DURATION: 100 MS
EKG QTC CALCULATION (BAZETT): 465 MS
EKG R AXIS: -10 DEGREES
EKG T AXIS: 108 DEGREES
EKG VENTRICULAR RATE: 104 BPM
EOSINOPHIL # BLD: 0.2 K/UL (ref 0–0.6)
EOSINOPHIL # BLD: 0.4 K/UL (ref 0–0.6)
EOSINOPHIL NFR BLD: 2.2 %
EOSINOPHIL NFR BLD: 4.3 %
FLUAV RNA RESP QL NAA+PROBE: NOT DETECTED
FLUBV RNA RESP QL NAA+PROBE: NOT DETECTED
GFR SERPLBLD CREATININE-BSD FMLA CKD-EPI: 8 ML/MIN/{1.73_M2}
GFR SERPLBLD CREATININE-BSD FMLA CKD-EPI: 9 ML/MIN/{1.73_M2}
GLUCOSE SERPL-MCNC: 163 MG/DL (ref 70–99)
GLUCOSE SERPL-MCNC: 245 MG/DL (ref 70–99)
HCO3 BLDV-SCNC: 25.1 MMOL/L (ref 23–29)
HCT VFR BLD AUTO: 26.6 % (ref 40.5–52.5)
HCT VFR BLD AUTO: 28.6 % (ref 40.5–52.5)
HGB BLD-MCNC: 8.8 G/DL (ref 13.5–17.5)
HGB BLD-MCNC: 9 G/DL (ref 13.5–17.5)
LYMPHOCYTES # BLD: 0.6 K/UL (ref 1–5.1)
LYMPHOCYTES # BLD: 1.5 K/UL (ref 1–5.1)
LYMPHOCYTES NFR BLD: 16.5 %
LYMPHOCYTES NFR BLD: 8.5 %
MCH RBC QN AUTO: 28.6 PG (ref 26–34)
MCH RBC QN AUTO: 29.3 PG (ref 26–34)
MCHC RBC AUTO-ENTMCNC: 31.4 G/DL (ref 31–36)
MCHC RBC AUTO-ENTMCNC: 33 G/DL (ref 31–36)
MCV RBC AUTO: 88.6 FL (ref 80–100)
MCV RBC AUTO: 90.9 FL (ref 80–100)
METHGB MFR BLDV: 0.1 %
MONOCYTES # BLD: 0.5 K/UL (ref 0–1.3)
MONOCYTES # BLD: 0.8 K/UL (ref 0–1.3)
MONOCYTES NFR BLD: 6.7 %
MONOCYTES NFR BLD: 8.2 %
NEUTROPHILS # BLD: 6 K/UL (ref 1.7–7.7)
NEUTROPHILS # BLD: 6.5 K/UL (ref 1.7–7.7)
NEUTROPHILS NFR BLD: 70.1 %
NEUTROPHILS NFR BLD: 82.1 %
O2 THERAPY: ABNORMAL
PCO2 BLDV: 41.4 MMHG (ref 40–50)
PH BLDV: 7.4 [PH] (ref 7.35–7.45)
PLATELET # BLD AUTO: 270 K/UL (ref 135–450)
PLATELET # BLD AUTO: 335 K/UL (ref 135–450)
PMV BLD AUTO: 8.3 FL (ref 5–10.5)
PMV BLD AUTO: 8.5 FL (ref 5–10.5)
PO2 BLDV: 98.8 MMHG (ref 25–40)
POTASSIUM SERPL-SCNC: 4.7 MMOL/L (ref 3.5–5.1)
POTASSIUM SERPL-SCNC: 5.3 MMOL/L (ref 3.5–5.1)
PROT SERPL-MCNC: 7.3 G/DL (ref 6.4–8.2)
PROT SERPL-MCNC: 7.6 G/DL (ref 6.4–8.2)
RBC # BLD AUTO: 3 M/UL (ref 4.2–5.9)
RBC # BLD AUTO: 3.14 M/UL (ref 4.2–5.9)
SAO2 % BLDV: 96 %
SARS-COV-2 RNA RESP QL NAA+PROBE: NOT DETECTED
SODIUM SERPL-SCNC: 135 MMOL/L (ref 136–145)
SODIUM SERPL-SCNC: 135 MMOL/L (ref 136–145)
TROPONIN, HIGH SENSITIVITY: 137 NG/L (ref 0–22)
TROPONIN, HIGH SENSITIVITY: 142 NG/L (ref 0–22)
TROPONIN, HIGH SENSITIVITY: 144 NG/L (ref 0–22)
TROPONIN, HIGH SENSITIVITY: 144 NG/L (ref 0–22)
WBC # BLD AUTO: 7.3 K/UL (ref 4–11)
WBC # BLD AUTO: 9.2 K/UL (ref 4–11)

## 2023-12-29 PROCEDURE — 6360000002 HC RX W HCPCS: Performed by: PHYSICIAN ASSISTANT

## 2023-12-29 PROCEDURE — 82803 BLOOD GASES ANY COMBINATION: CPT

## 2023-12-29 PROCEDURE — 83880 ASSAY OF NATRIURETIC PEPTIDE: CPT

## 2023-12-29 PROCEDURE — 94761 N-INVAS EAR/PLS OXIMETRY MLT: CPT

## 2023-12-29 PROCEDURE — 36415 COLL VENOUS BLD VENIPUNCTURE: CPT

## 2023-12-29 PROCEDURE — 6370000000 HC RX 637 (ALT 250 FOR IP): Performed by: PHYSICIAN ASSISTANT

## 2023-12-29 PROCEDURE — 85025 COMPLETE CBC W/AUTO DIFF WBC: CPT

## 2023-12-29 PROCEDURE — 94640 AIRWAY INHALATION TREATMENT: CPT

## 2023-12-29 PROCEDURE — 93005 ELECTROCARDIOGRAM TRACING: CPT | Performed by: PHYSICIAN ASSISTANT

## 2023-12-29 PROCEDURE — 99285 EMERGENCY DEPT VISIT HI MDM: CPT

## 2023-12-29 PROCEDURE — 96374 THER/PROPH/DIAG INJ IV PUSH: CPT

## 2023-12-29 PROCEDURE — 84484 ASSAY OF TROPONIN QUANT: CPT

## 2023-12-29 PROCEDURE — 94660 CPAP INITIATION&MGMT: CPT

## 2023-12-29 PROCEDURE — 6370000000 HC RX 637 (ALT 250 FOR IP)

## 2023-12-29 PROCEDURE — 87636 SARSCOV2 & INF A&B AMP PRB: CPT

## 2023-12-29 PROCEDURE — 93010 ELECTROCARDIOGRAM REPORT: CPT | Performed by: INTERNAL MEDICINE

## 2023-12-29 PROCEDURE — 2700000000 HC OXYGEN THERAPY PER DAY

## 2023-12-29 PROCEDURE — 6370000000 HC RX 637 (ALT 250 FOR IP): Performed by: STUDENT IN AN ORGANIZED HEALTH CARE EDUCATION/TRAINING PROGRAM

## 2023-12-29 PROCEDURE — 71045 X-RAY EXAM CHEST 1 VIEW: CPT

## 2023-12-29 PROCEDURE — 80053 COMPREHEN METABOLIC PANEL: CPT

## 2023-12-29 RX ORDER — LORAZEPAM 2 MG/ML
0.5 INJECTION INTRAMUSCULAR EVERY 6 HOURS PRN
Status: DISCONTINUED | OUTPATIENT
Start: 2023-12-29 | End: 2023-12-29

## 2023-12-29 RX ORDER — LORAZEPAM 1 MG/1
1 TABLET ORAL ONCE
Status: COMPLETED | OUTPATIENT
Start: 2023-12-29 | End: 2023-12-29

## 2023-12-29 RX ORDER — IPRATROPIUM BROMIDE AND ALBUTEROL SULFATE 2.5; .5 MG/3ML; MG/3ML
1 SOLUTION RESPIRATORY (INHALATION) ONCE
Status: COMPLETED | OUTPATIENT
Start: 2023-12-29 | End: 2023-12-29

## 2023-12-29 RX ORDER — FUROSEMIDE 10 MG/ML
60 INJECTION INTRAMUSCULAR; INTRAVENOUS ONCE
Status: COMPLETED | OUTPATIENT
Start: 2023-12-29 | End: 2023-12-29

## 2023-12-29 RX ORDER — DIAZEPAM 5 MG/ML
2.5 INJECTION, SOLUTION INTRAMUSCULAR; INTRAVENOUS ONCE
Status: COMPLETED | OUTPATIENT
Start: 2023-12-29 | End: 2023-12-29

## 2023-12-29 RX ORDER — IPRATROPIUM BROMIDE AND ALBUTEROL SULFATE 2.5; .5 MG/3ML; MG/3ML
SOLUTION RESPIRATORY (INHALATION)
Status: COMPLETED
Start: 2023-12-29 | End: 2023-12-29

## 2023-12-29 RX ADMIN — LORAZEPAM 1 MG: 1 TABLET ORAL at 20:54

## 2023-12-29 RX ADMIN — NITROGLYCERIN 0.5 INCH: 20 OINTMENT TOPICAL at 11:19

## 2023-12-29 RX ADMIN — IPRATROPIUM BROMIDE AND ALBUTEROL SULFATE 1 DOSE: 2.5; .5 SOLUTION RESPIRATORY (INHALATION) at 11:16

## 2023-12-29 RX ADMIN — DIAZEPAM 2.5 MG: 5 INJECTION INTRAMUSCULAR; INTRAVENOUS at 11:20

## 2023-12-29 RX ADMIN — IPRATROPIUM BROMIDE AND ALBUTEROL SULFATE 1 DOSE: .5; 3 SOLUTION RESPIRATORY (INHALATION) at 11:16

## 2023-12-29 RX ADMIN — IPRATROPIUM BROMIDE AND ALBUTEROL SULFATE 1 DOSE: 2.5; .5 SOLUTION RESPIRATORY (INHALATION) at 21:59

## 2023-12-29 RX ADMIN — FUROSEMIDE 60 MG: 10 INJECTION, SOLUTION INTRAMUSCULAR; INTRAVENOUS at 21:50

## 2023-12-29 RX ADMIN — PREDNISONE 50 MG: 20 TABLET ORAL at 21:59

## 2023-12-29 ASSESSMENT — PAIN - FUNCTIONAL ASSESSMENT: PAIN_FUNCTIONAL_ASSESSMENT: 0-10

## 2023-12-29 ASSESSMENT — PAIN DESCRIPTION - LOCATION: LOCATION: BACK

## 2023-12-29 ASSESSMENT — PAIN SCALES - GENERAL: PAINLEVEL_OUTOF10: 9

## 2023-12-29 NOTE — ED NOTES
Pt was to go to dialysis and did not go came here instead. Pt with some belly breathing and c/o SOB on arrival. Pt does have a care plan in place. Pt placed on bipap and tolerated well. Pt with monitors in place will cont to monitor.

## 2023-12-29 NOTE — PROGRESS NOTES
12/29/23 1101   NIV Type   $NIV $Daily Charge   NIV Started/Stopped (S)  On   Equipment Type v60   Mode Bilevel   Mask Type Full face mask   Mask Size Large   Assessment   Pulse (!) 143   Respirations 24   SpO2 99 %   Breath Sounds   Breath Sounds Bilateral Diminished   Settings/Measurements   PIP Observed 13 cm H20   IPAP 12 cmH20   CPAP/EPAP 6 cmH2O   Vt (Measured) 670 mL   Rate Ordered 12   Insp Rise Time (%) 1 %   FiO2  50 %   I Time/ I Time % 1 s   Minute Volume (L/min) 20.9 Liters   Mask Leak (lpm) 0 lpm   Patient's Home Machine No   Alarm Settings   Alarms On Y

## 2023-12-29 NOTE — DISCHARGE INSTRUCTIONS
-Got to dialysis tomorrow at 8.  -Come back if you feel worse.   -Follow up with your primary doctor in the next week.

## 2023-12-29 NOTE — CARE COORDINATION
Patient well known from previous visits. Patient was to go to HD today. Patient called clinic and told them he was too SOB. Clinic attempted to encourage patient to go to HD today. Patient refused. Per HD they can run patient tomorrow at 1000. RN and MD notified.   Electronically signed by NINO Herrera on 12/29/2023 at 10:52 AM

## 2023-12-29 NOTE — ED NOTES
Pt tolerated oxygen and wife arrived with home tank and pt home on 4 lit of oxygen pt aware of dialysis apt in AM

## 2023-12-30 ENCOUNTER — HOSPITAL ENCOUNTER (EMERGENCY)
Age: 53
Discharge: LEFT BEFORE DISCHARGE | End: 2023-12-30
Attending: EMERGENCY MEDICINE
Payer: MEDICARE

## 2023-12-30 ENCOUNTER — APPOINTMENT (OUTPATIENT)
Dept: CT IMAGING | Age: 53
End: 2023-12-30
Payer: MEDICARE

## 2023-12-30 VITALS
DIASTOLIC BLOOD PRESSURE: 109 MMHG | BODY MASS INDEX: 31.1 KG/M2 | WEIGHT: 210 LBS | RESPIRATION RATE: 18 BRPM | SYSTOLIC BLOOD PRESSURE: 169 MMHG | HEIGHT: 69 IN | TEMPERATURE: 97.7 F | HEART RATE: 116 BPM | OXYGEN SATURATION: 100 %

## 2023-12-30 VITALS
OXYGEN SATURATION: 98 % | SYSTOLIC BLOOD PRESSURE: 150 MMHG | TEMPERATURE: 97.9 F | RESPIRATION RATE: 21 BRPM | DIASTOLIC BLOOD PRESSURE: 100 MMHG | HEART RATE: 105 BPM

## 2023-12-30 DIAGNOSIS — J90 PLEURAL EFFUSION: ICD-10-CM

## 2023-12-30 DIAGNOSIS — Z53.29 LEFT AGAINST MEDICAL ADVICE: ICD-10-CM

## 2023-12-30 DIAGNOSIS — I48.91 ATRIAL FIBRILLATION WITH RVR (HCC): ICD-10-CM

## 2023-12-30 DIAGNOSIS — Z99.2 DIALYSIS PATIENT (HCC): ICD-10-CM

## 2023-12-30 DIAGNOSIS — Z91.199 MEDICALLY NONCOMPLIANT: ICD-10-CM

## 2023-12-30 DIAGNOSIS — Z99.81 OXYGEN DEPENDENT: ICD-10-CM

## 2023-12-30 DIAGNOSIS — J44.1 COPD EXACERBATION (HCC): Primary | ICD-10-CM

## 2023-12-30 LAB
ALBUMIN SERPL-MCNC: 4.4 G/DL (ref 3.4–5)
ALBUMIN/GLOB SERPL: 1.1 {RATIO} (ref 1.1–2.2)
ALP SERPL-CCNC: 164 U/L (ref 40–129)
ALT SERPL-CCNC: 13 U/L (ref 10–40)
ANION GAP SERPL CALCULATED.3IONS-SCNC: 21 MMOL/L (ref 3–16)
AST SERPL-CCNC: 18 U/L (ref 15–37)
BASOPHILS # BLD: 0 K/UL (ref 0–0.2)
BASOPHILS NFR BLD: 0.2 %
BETA-HYDROXYBUTYRATE: 1.5 MMOL/L (ref 0–0.27)
BILIRUB SERPL-MCNC: 0.7 MG/DL (ref 0–1)
BUN SERPL-MCNC: 34 MG/DL (ref 7–20)
CALCIUM SERPL-MCNC: 9.7 MG/DL (ref 8.3–10.6)
CHLORIDE SERPL-SCNC: 89 MMOL/L (ref 99–110)
CO2 SERPL-SCNC: 25 MMOL/L (ref 21–32)
CREAT SERPL-MCNC: 4.3 MG/DL (ref 0.9–1.3)
DEPRECATED RDW RBC AUTO: 17.2 % (ref 12.4–15.4)
EKG ATRIAL RATE: 117 BPM
EKG ATRIAL RATE: 93 BPM
EKG DIAGNOSIS: NORMAL
EKG DIAGNOSIS: NORMAL
EKG Q-T INTERVAL: 356 MS
EKG Q-T INTERVAL: 408 MS
EKG QRS DURATION: 90 MS
EKG QRS DURATION: 90 MS
EKG QTC CALCULATION (BAZETT): 468 MS
EKG QTC CALCULATION (BAZETT): 556 MS
EKG R AXIS: 37 DEGREES
EKG R AXIS: 38 DEGREES
EKG T AXIS: 108 DEGREES
EKG T AXIS: 161 DEGREES
EKG VENTRICULAR RATE: 104 BPM
EKG VENTRICULAR RATE: 112 BPM
EOSINOPHIL # BLD: 0 K/UL (ref 0–0.6)
EOSINOPHIL NFR BLD: 0 %
GFR SERPLBLD CREATININE-BSD FMLA CKD-EPI: 15 ML/MIN/{1.73_M2}
GLUCOSE SERPL-MCNC: 168 MG/DL (ref 70–99)
HCT VFR BLD AUTO: 30.5 % (ref 40.5–52.5)
HGB BLD-MCNC: 9.7 G/DL (ref 13.5–17.5)
LYMPHOCYTES # BLD: 0.2 K/UL (ref 1–5.1)
LYMPHOCYTES NFR BLD: 4.5 %
MCH RBC QN AUTO: 29.2 PG (ref 26–34)
MCHC RBC AUTO-ENTMCNC: 31.9 G/DL (ref 31–36)
MCV RBC AUTO: 91.5 FL (ref 80–100)
MONOCYTES # BLD: 0.2 K/UL (ref 0–1.3)
MONOCYTES NFR BLD: 4.2 %
NEUTROPHILS # BLD: 4.7 K/UL (ref 1.7–7.7)
NEUTROPHILS NFR BLD: 91.1 %
NT-PROBNP SERPL-MCNC: ABNORMAL PG/ML (ref 0–124)
NT-PROBNP SERPL-MCNC: ABNORMAL PG/ML (ref 0–124)
PLATELET # BLD AUTO: 300 K/UL (ref 135–450)
PMV BLD AUTO: 8.1 FL (ref 5–10.5)
POTASSIUM SERPL-SCNC: 4.6 MMOL/L (ref 3.5–5.1)
PROCALCITONIN SERPL IA-MCNC: 0.73 NG/ML (ref 0–0.15)
PROT SERPL-MCNC: 8.4 G/DL (ref 6.4–8.2)
RBC # BLD AUTO: 3.33 M/UL (ref 4.2–5.9)
SODIUM SERPL-SCNC: 135 MMOL/L (ref 136–145)
TROPONIN, HIGH SENSITIVITY: 162 NG/L (ref 0–22)
WBC # BLD AUTO: 5.1 K/UL (ref 4–11)

## 2023-12-30 PROCEDURE — 6360000004 HC RX CONTRAST MEDICATION: Performed by: NURSE PRACTITIONER

## 2023-12-30 PROCEDURE — 82010 KETONE BODYS QUAN: CPT

## 2023-12-30 PROCEDURE — 93010 ELECTROCARDIOGRAM REPORT: CPT | Performed by: INTERNAL MEDICINE

## 2023-12-30 PROCEDURE — 36415 COLL VENOUS BLD VENIPUNCTURE: CPT

## 2023-12-30 PROCEDURE — 93005 ELECTROCARDIOGRAM TRACING: CPT | Performed by: EMERGENCY MEDICINE

## 2023-12-30 PROCEDURE — 84145 PROCALCITONIN (PCT): CPT

## 2023-12-30 PROCEDURE — 96365 THER/PROPH/DIAG IV INF INIT: CPT

## 2023-12-30 PROCEDURE — 2580000003 HC RX 258: Performed by: NURSE PRACTITIONER

## 2023-12-30 PROCEDURE — 6360000002 HC RX W HCPCS: Performed by: NURSE PRACTITIONER

## 2023-12-30 PROCEDURE — 83880 ASSAY OF NATRIURETIC PEPTIDE: CPT

## 2023-12-30 PROCEDURE — 85025 COMPLETE CBC W/AUTO DIFF WBC: CPT

## 2023-12-30 PROCEDURE — 80053 COMPREHEN METABOLIC PANEL: CPT

## 2023-12-30 PROCEDURE — 99285 EMERGENCY DEPT VISIT HI MDM: CPT

## 2023-12-30 PROCEDURE — 96375 TX/PRO/DX INJ NEW DRUG ADDON: CPT

## 2023-12-30 PROCEDURE — 71260 CT THORAX DX C+: CPT

## 2023-12-30 PROCEDURE — 84484 ASSAY OF TROPONIN QUANT: CPT

## 2023-12-30 PROCEDURE — 6370000000 HC RX 637 (ALT 250 FOR IP): Performed by: NURSE PRACTITIONER

## 2023-12-30 RX ORDER — IPRATROPIUM BROMIDE AND ALBUTEROL SULFATE 2.5; .5 MG/3ML; MG/3ML
1 SOLUTION RESPIRATORY (INHALATION) ONCE
Status: COMPLETED | OUTPATIENT
Start: 2023-12-30 | End: 2023-12-30

## 2023-12-30 RX ORDER — AZITHROMYCIN 250 MG/1
250 TABLET, FILM COATED ORAL SEE ADMIN INSTRUCTIONS
Qty: 6 TABLET | Refills: 0 | Status: ON HOLD | OUTPATIENT
Start: 2023-12-30 | End: 2024-01-05 | Stop reason: HOSPADM

## 2023-12-30 RX ORDER — MAGNESIUM SULFATE IN WATER 40 MG/ML
2000 INJECTION, SOLUTION INTRAVENOUS ONCE
Status: COMPLETED | OUTPATIENT
Start: 2023-12-30 | End: 2023-12-30

## 2023-12-30 RX ORDER — OMEPRAZOLE 20 MG/1
20 CAPSULE, DELAYED RELEASE ORAL DAILY
Status: ON HOLD | COMMUNITY
End: 2024-01-05 | Stop reason: HOSPADM

## 2023-12-30 RX ORDER — PREDNISONE 20 MG/1
TABLET ORAL
Qty: 18 TABLET | Refills: 0 | Status: ON HOLD | OUTPATIENT
Start: 2023-12-30 | End: 2024-01-04

## 2023-12-30 RX ORDER — AMOXICILLIN AND CLAVULANATE POTASSIUM 875; 125 MG/1; MG/1
1 TABLET, FILM COATED ORAL 2 TIMES DAILY
Qty: 20 TABLET | Refills: 0 | Status: ON HOLD | OUTPATIENT
Start: 2023-12-30 | End: 2024-01-05 | Stop reason: HOSPADM

## 2023-12-30 RX ORDER — TORSEMIDE 100 MG/1
100 TABLET ORAL DAILY
COMMUNITY

## 2023-12-30 RX ADMIN — IOPAMIDOL 75 ML: 755 INJECTION, SOLUTION INTRAVENOUS at 11:13

## 2023-12-30 RX ADMIN — MAGNESIUM SULFATE HEPTAHYDRATE 2000 MG: 40 INJECTION, SOLUTION INTRAVENOUS at 10:41

## 2023-12-30 RX ADMIN — IPRATROPIUM BROMIDE AND ALBUTEROL SULFATE 1 DOSE: 2.5; .5 SOLUTION RESPIRATORY (INHALATION) at 10:16

## 2023-12-30 RX ADMIN — METHYLPREDNISOLONE SODIUM SUCCINATE 40 MG: 40 INJECTION INTRAMUSCULAR; INTRAVENOUS at 10:42

## 2023-12-30 ASSESSMENT — PAIN SCALES - GENERAL: PAINLEVEL_OUTOF10: 6

## 2023-12-30 ASSESSMENT — PAIN - FUNCTIONAL ASSESSMENT: PAIN_FUNCTIONAL_ASSESSMENT: 0-10

## 2023-12-30 NOTE — ED NOTES
A ([facility] .[phone]) guidewire was removed.  Patient refusing COVID test and blood cx's. Will update provider.

## 2023-12-30 NOTE — ED NOTES
IV started by clinical, IV lasix given per management plan. Pt appears to be more comfortable than earlier.

## 2023-12-30 NOTE — CARE COORDINATION
CM update; Received call from ED stating patient is in ED for C/o shortness of Breath. Per nursing Patient left dialysis an hour early. I called Gillian [facility]ch and spoke to charge and she stated \" he comes when he wants to\". She also wants me to remind him he is to run tomorrow also. Will go to ED and review with patient. Malaika Woods RN

## 2023-12-30 NOTE — ED PROVIDER NOTES
[address] [address] ED  EMERGENCY DEPARTMENT ENCOUNTER        Pt Name: Natalie Doe  MRN: [id number]  [address] [facility] 1970  Date of evaluation: 12/29/2023  Provider: Cresencio Soler PA-C  PCP: JAY Thrasher CNP  Note Started: 7:26 PM EST 12/29/23       I have seen and evaluated this patient with my supervising physician Dr. Christine Sesay. CHIEF COMPLAINT       Chief Complaint   Patient presents with    Anxiety     Seen and dc'd from [facility]. Called for anxiety. Hx copd, on home o2. HISTORY OF PRESENT ILLNESS: 1 or more Elements     History From: Patient            Chief Complaint: Shortness of breath    Natalie Doe is a 52 y.o. male who presents because he tells me he is tired of being short of breath. He has CKD diabetes COPD and advanced pulmonary disease and is supposed to be on BiPAP. He tells me he just left [facility] emergency department. He went home and could not get of his car because he was so short of breath. He missed dialysis today. He denies any new cough body aches fever vomiting diarrhea abdominal pain. He does feel some chest discomfort. Nursing Notes were all reviewed and agreed with or any disagreements were addressed in the HPI. REVIEW OF SYSTEMS :      Review of Systems    Positives and Pertinent negatives as per HPI.      SURGICAL HISTORY     Past Surgical History:   Procedure Laterality Date    AORTIC VALVE REPLACEMENT N/A 10/15/2019    TRANSCATHETER AORTIC VALVE REPLACEMENT FEMORAL APPROACH performed by Addy Ham MD at [facility] Right 7/2/2019    PERITONEAL DIALYSIS CATHETER REMOVAL performed by Minda Bumpers, MD at [address] [place] [address]  2/29/2015    WNL    CORONARY ANGIOPLASTY WITH STENT PLACEMENT  05/26/15    CYST REMOVAL  08/14/2013    EXCISION CYSTS, NECK X2 AND ABDOMINAL benign    DIAGNOSTIC CARDIAC CATH LAB PROCEDURE      DIALYSIS FISTULA CREATION Left 10/30/2017    LEFT initiated with COVID and flu test being negative.  VBG ordered.    Patient has pulmonary edema with vascular congestion, renal failure with creatinine 9 GFR 8 with hyperkalemia 5.3.  He has had continued hypertension and is in A-fib with RVR heart rate between 90 and 110.  He does not appear to have infectious etiology, his COVID and flu are negative.  Plan to admit the patient for dialysis in the morning.  He was given small dose of Ativan for his anxiety.  Unfortunately he has severe respiratory failure seems to be worsening.      Patient does have a history of a flutter.  He is on Plavix.    Patient has a care plan.  [facility] is closed at this time.  Plan to give patient 60 mg IV Lasix and reassess in 2 hours.  Then plan to consult with nephrology and admit if he does not improve.    The patient tolerated their visit well.  The patient and / or the family were informed of the results of any tests, a time was given to answer questions, a plan was proposed and they agreed with plan.    I am the Primary Clinician of Record.  FINAL IMPRESSION      1. Acute pulmonary edema (HCC)    2. Shortness of breath    3. Stage 5 chronic kidney disease on chronic dialysis (HCC)          DISPOSITION/PLAN     DISPOSITION Decision To Admit 12/29/2023 09:02:38 PM      PATIENT REFERRED TO:  No follow-up provider specified.    DISCHARGE MEDICATIONS:  New Prescriptions    No medications on file       DISCONTINUED MEDICATIONS:  Discontinued Medications    No medications on file              (Please note that portions of this note were completed with a voice recognition program.  Efforts were made to edit the dictations but occasionally words are mis-transcribed.)    Estrella Jasso PA-C (electronically signed)      Estrella Jasso PA-C  12/29/23 2118       Estrella Jasso PA-C  12/29/23 2145

## 2023-12-30 NOTE — DISCHARGE INSTRUCTIONS
Please call [facility] first thing in the morning to see if they can get you in tomorrow since you missed dialysis today.

## 2023-12-30 NOTE — ED PROVIDER NOTES
lungs every 6 hours as needed for Shortness of Breath    ISOSORBIDE DINITRATE (ISORDIL) 20 MG TABLET    Take 1 tablet by mouth 3 times daily    METOPROLOL SUCCINATE (TOPROL XL) 100 MG EXTENDED RELEASE TABLET    Take 1 tablet by mouth in the morning and at bedtime    MIDODRINE (PROAMATINE) 5 MG TABLET    Take 2 tablets by mouth 3 times daily as needed (Give in dialysis or SBP <110)    OMEPRAZOLE (PRILOSEC) 20 MG DELAYED RELEASE CAPSULE    Take 1 capsule by mouth daily    PANTOPRAZOLE (PROTONIX) 40 MG TABLET    TAKE 1 TABLET BY MOUTH EVERY MORNING BEFORE BREAKFAST    PRAVASTATIN (PRAVACHOL) 40 MG TABLET    TAKE 1 TABLET BY MOUTH DAILY    QUETIAPINE (SEROQUEL) 50 MG TABLET    TAKE 1 TABLET BY MOUTH EVERY EVENING    TORSEMIDE (DEMADEX) 100 MG TABLET    Take 1 tablet by mouth daily    TRAZODONE (DESYREL) 50 MG TABLET    Take 1 tablet by mouth daily    VITAMIN D (ERGOCALCIFEROL) 25145 UNITS CAPS CAPSULE    Take 1 capsule by mouth once a week Patient takes on Tuesday         ALLERGIES     Morphine      FAMILY HISTORY       Family History   Problem Relation Age of Onset    Diabetes Mother     Heart Disease Father     Kidney Disease Sister         stage 4-kidney failure    Cancer Sister     Heart Disease Sister     Obesity Sister     Cancer Sister     Heart Disease Sister     Obesity Sister     Alcohol Abuse Brother           SOCIAL HISTORY       Social History     Socioeconomic History    Marital status:      Spouse name: None    Number of children: None    Years of education: None    Highest education level: None   Tobacco Use    Smoking status: Former     Current packs/day: 0.00     Average packs/day: 0.5 packs/day for 33.0 years (16.5 ttl pk-yrs)     Types: Cigarettes     Start date: 4/26/1987     Quit date: 4/26/2020     Years since quitting: 3.6    Smokeless tobacco: Never    Tobacco comments:     States quit December 2021   Vaping Use    Vaping Use: Never used   Substance and Sexual Activity    Alcohol use: Not  days  for re-evaluation    [place] [facility] PHYSICAL [facility] ED  [place]  [phone]    If symptoms worsen      DISCHARGE MEDICATIONS:  New Prescriptions    AMOXICILLIN-CLAVULANATE (AUGMENTIN) 875-125 MG PER TABLET    Take 1 tablet by mouth 2 times daily for 10 days    AZITHROMYCIN (ZITHROMAX) 250 MG TABLET    Take 1 tablet by mouth See Admin Instructions for 5 days 500mg on day 1 followed by 250mg on days 2 - 5    PREDNISONE (DELTASONE) 20 MG TABLET    Take 3 tabs orally daily for 3 days, then take 2 tabs orally for 3 days then take 1 tab orally for 3 days. DISCONTINUED MEDICATIONS:  Discontinued Medications    No medications on file              (Please note that portions of this note were completed with a voice recognition program.  Efforts were made to edit the dictations but occasionally words are mis-transcribed.)    [facility]. N95 and appropriate PPE was worn during the visit.       JAY Lozano CNP (electronically signed)        JAY Lozano CNP  12/30/23 1231       JAY Lozano CNP  12/30/23 1232

## 2023-12-30 NOTE — ED NOTES
Dr. Keri Fernandez and wife spoke with pt in regards to going home. Pt still was refusing to rad home, he wanted to stay the night in the ER until he was able to go to Dialysis later 9in the morning. IV was removed from right upper arm with out difficulty, pt was wheeled to exit and assisted in [address] car. Satirations remained in the upper 90's while on his home o2.

## 2023-12-30 NOTE — ED PROVIDER NOTES
THIS IS MY JOSIANE SUPERVISORY AND SHARED VISIT NOTE:    I personally saw the patient and made/approved the management plan and take responsibility for the patient management. History: Patient is a 77-year-old male, history of end-stage renal disease on dialysis Monday, Wednesday, and Friday. Presents here today with concerns for worsening shortness of breath and anxiety after being discharged from [facility]. He states that he attempted to go to his dialysis clinic at 11 AM today and that they were closed. He does normally wear oxygen at home, no increased oxygen requirement. He is supposed to be wearing BiPAP at home as well which she is noncompliant with. He denies any other complaints or concerns currently. Does have a care plan. Exam: Patient appears slightly anxious but is in no acute distress. He is tachycardic and tachypneic on my exam.  Mildly diminished breath sounds in bilateral lung bases. No evidence of significant fluid overload. Abdomen soft, nondistended, nontender. MDM: Patient is a 77-year-old male, end-stage renal disease on dialysis, history of medication and BiPAP noncompliance, presenting with concerns for worsening shortness of breath and anxiety. Did miss his dialysis appointment earlier today. Upon arrival in the ED, vitals remarkable for hypertension and tachycardia. Patient was initially treated with 1 mg of oral Ativan for his anxiety. His care plan was reviewed and he was treated with 60 mg of Lasix. Chest x-ray does show evidence of edema however he is on his baseline oxygen, is noncompliant with his BiPAP at home. He is slightly hyperkalemic with potassium of 5.3 however no current EKG changes associated with this. I do feel that he would benefit from outpatient dialysis and he was advised to call his dialysis clinic first thing in the morning to attempt to set up a follow-up appointment and to use his BiPAP overnight tonight.   He does have a history of COPD and

## 2023-12-30 NOTE — ED PROVIDER NOTES
I independently examined and evaluated Mariana Stein. In brief, patient is a 40-year-old male with history of ESRD on dialysis, pulmonary edema, paroxysmal atrial fibrillation, heart failure presents to the emergency department for evaluation of shortness of breath. Reports he had to stop the dialysis short today because he was not feeling well. Says he is not sure if the shortness of breath is like the prior shortness of breath he has had or if it is any different. Says he has not been checking his temperatures at home. Denies having any other complaints other than feeling short of breath. Says he feels better after receiving the breathing treatment. Patient was seen by case management after reviewing his management plan. Patient agreeable with plan to follow-up with dialysis tomorrow. Procalcitonin high. Refusing blood cultures. Ultimately left AMA. Demonstrated capacity to make decisions. The Ekg interpreted by me shows  Atrial fibrillation with rapid ventricular response with premature ventricular or aberrantly conducted complexes with a rate of 112  Axis is normal  QTc is prolonged at 556    ST Segments: Nonspecific ST and T wave abnormality. Poor baseline due to patient cooperation. All diagnostic, treatment, and disposition decisions were made by myself in conjunction with the advanced practice provider/resident physician. I personally saw the patient and performed a substantive portion of the visit including aspects of the medical decision making. I personally saw the patient and independently provided 10 minutes of non-concurrent critical care out of the total shared critical care time provided. Comment: Please note this report has been produced using speech recognition software and may contain errors related to that system including errors in grammar, punctuation, and spelling, as well as words and phrases that may be inappropriate.  If there are any questions or concerns

## 2024-01-01 NOTE — ED PROVIDER NOTES
ECG    The Ekg interpreted by me shows sinus tachycardia with PACs and a rate of 104 bpm.  Normal axis. No acute injury pattern. , , QTc 465.     No significant change from prior EKG dated 12/26/2023     Anna Mcmullen DO  12/29/23 3196
solution 1 Dose (1 Dose Inhalation Given by Other 12/29/23 1116)           53-year-old male presents to the emergency department for evaluation of shortness of breath.  On exam he is breathing rapidly, and visibly short of breath.  Vital signs are notable for tachycardia and hypertension, both of which improved during his time in the emergency department.    Workup today shows a CBC with no leukocytosis, chronic anemia.  Chemistry with elevated blood glucose, baseline extremely elevated creatinine, secondary to hemodialysis.  Normal liver function.  VBG shows normal pH without CO2 retention.  Troponin is elevated, but at patient's baseline and stable x 2.  Viral testing shows negative COVID-negative flu.  Chest x-ray shows bilateral lung base opacifications.  EKG shows sinus tachycardia with no acute ischemic changes.      Patient was treated with BiPAP, diazepam nitroglycerin and DuoNebs.  This improved his symptoms substantially.  I discussed going to dialysis, tomorrow.  We called and confirmed that they can take him at 1030.    He knows to return to the ED should symptoms change or worsen.    FINAL IMPRESSION      1. Dyspnea, unspecified type          DISPOSITION/PLAN   DISPOSITION Decision To Discharge 12/29/2023 01:33:43 PM      PATIENT REFERRED TO:  No follow-up provider specified.    DISCHARGE MEDICATIONS:  Discharge Medication List as of 12/29/2023  3:50 PM          JASE Rhoades (electronically signed)        Oc Overton PA  01/01/24 1427

## 2024-01-02 ENCOUNTER — HOSPITAL ENCOUNTER (INPATIENT)
Age: 54
LOS: 3 days | Discharge: HOME | End: 2024-01-06
Attending: STUDENT IN AN ORGANIZED HEALTH CARE EDUCATION/TRAINING PROGRAM | Admitting: INTERNAL MEDICINE
Payer: MEDICARE

## 2024-01-02 ENCOUNTER — APPOINTMENT (OUTPATIENT)
Dept: GENERAL RADIOLOGY | Age: 54
End: 2024-01-02
Payer: MEDICARE

## 2024-01-02 DIAGNOSIS — R06.89 DYSPNEA AND RESPIRATORY ABNORMALITIES: Primary | ICD-10-CM

## 2024-01-02 DIAGNOSIS — R06.00 DYSPNEA AND RESPIRATORY ABNORMALITIES: Primary | ICD-10-CM

## 2024-01-02 LAB
ALBUMIN SERPL-MCNC: 4.1 G/DL (ref 3.4–5)
ALBUMIN/GLOB SERPL: 1.4 {RATIO} (ref 1.1–2.2)
ALP SERPL-CCNC: 137 U/L (ref 40–129)
ALT SERPL-CCNC: 11 U/L (ref 10–40)
ANION GAP SERPL CALCULATED.3IONS-SCNC: 19 MMOL/L (ref 3–16)
AST SERPL-CCNC: 11 U/L (ref 15–37)
BASE EXCESS BLDV CALC-SCNC: 1.9 MMOL/L (ref -3–3)
BASOPHILS # BLD: 0.1 K/UL (ref 0–0.2)
BASOPHILS NFR BLD: 0.8 %
BILIRUB SERPL-MCNC: 0.3 MG/DL (ref 0–1)
BUN SERPL-MCNC: 72 MG/DL (ref 7–20)
CALCIUM SERPL-MCNC: 8.9 MG/DL (ref 8.3–10.6)
CHLORIDE SERPL-SCNC: 88 MMOL/L (ref 99–110)
CO2 BLDV-SCNC: 29 MMOL/L
CO2 SERPL-SCNC: 27 MMOL/L (ref 21–32)
COHGB MFR BLDV: 1.3 % (ref 0–1.5)
CREAT SERPL-MCNC: 8.5 MG/DL (ref 0.9–1.3)
DEPRECATED RDW RBC AUTO: 17.5 % (ref 12.4–15.4)
EOSINOPHIL # BLD: 0.3 K/UL (ref 0–0.6)
EOSINOPHIL NFR BLD: 3.4 %
GFR SERPLBLD CREATININE-BSD FMLA CKD-EPI: 7 ML/MIN/{1.73_M2}
GLUCOSE SERPL-MCNC: 197 MG/DL (ref 70–99)
HCO3 BLDV-SCNC: 27.9 MMOL/L (ref 23–29)
HCT VFR BLD AUTO: 29.8 % (ref 40.5–52.5)
HGB BLD-MCNC: 9.8 G/DL (ref 13.5–17.5)
INR PPP: 1.05 (ref 0.84–1.16)
LYMPHOCYTES # BLD: 1 K/UL (ref 1–5.1)
LYMPHOCYTES NFR BLD: 9.7 %
MCH RBC QN AUTO: 29.5 PG (ref 26–34)
MCHC RBC AUTO-ENTMCNC: 32.8 G/DL (ref 31–36)
MCV RBC AUTO: 90 FL (ref 80–100)
METHGB MFR BLDV: 0.3 %
MONOCYTES # BLD: 0.8 K/UL (ref 0–1.3)
MONOCYTES NFR BLD: 7.7 %
NEUTROPHILS # BLD: 7.8 K/UL (ref 1.7–7.7)
NEUTROPHILS NFR BLD: 78.4 %
NT-PROBNP SERPL-MCNC: ABNORMAL PG/ML (ref 0–124)
O2 CT VFR BLDV CALC: 8 VOL %
O2 THERAPY: ABNORMAL
PCO2 BLDV: 50.6 MMHG (ref 40–50)
PH BLDV: 7.36 [PH] (ref 7.35–7.45)
PLATELET # BLD AUTO: 326 K/UL (ref 135–450)
PMV BLD AUTO: 8.6 FL (ref 5–10.5)
PO2 BLDV: 34.9 MMHG (ref 25–40)
POTASSIUM SERPL-SCNC: 4.4 MMOL/L (ref 3.5–5.1)
PROT SERPL-MCNC: 7.1 G/DL (ref 6.4–8.2)
PROTHROMBIN TIME: 13.7 SEC (ref 11.5–14.8)
RBC # BLD AUTO: 3.31 M/UL (ref 4.2–5.9)
REASON FOR REJECTION: NORMAL
REJECTED TEST: NORMAL
SAO2 % BLDV: 64 %
SODIUM SERPL-SCNC: 134 MMOL/L (ref 136–145)
TROPONIN, HIGH SENSITIVITY: 285 NG/L (ref 0–22)
TROPONIN, HIGH SENSITIVITY: 292 NG/L (ref 0–22)
WBC # BLD AUTO: 9.9 K/UL (ref 4–11)

## 2024-01-02 PROCEDURE — 84460 ALANINE AMINO (ALT) (SGPT): CPT

## 2024-01-02 PROCEDURE — 99285 EMERGENCY DEPT VISIT HI MDM: CPT

## 2024-01-02 PROCEDURE — 84450 TRANSFERASE (AST) (SGOT): CPT

## 2024-01-02 PROCEDURE — 84484 ASSAY OF TROPONIN QUANT: CPT

## 2024-01-02 PROCEDURE — 94761 N-INVAS EAR/PLS OXIMETRY MLT: CPT

## 2024-01-02 PROCEDURE — 94660 CPAP INITIATION&MGMT: CPT

## 2024-01-02 PROCEDURE — 2700000000 HC OXYGEN THERAPY PER DAY

## 2024-01-02 PROCEDURE — 6360000002 HC RX W HCPCS: Performed by: STUDENT IN AN ORGANIZED HEALTH CARE EDUCATION/TRAINING PROGRAM

## 2024-01-02 PROCEDURE — 84132 ASSAY OF SERUM POTASSIUM: CPT

## 2024-01-02 PROCEDURE — 96374 THER/PROPH/DIAG INJ IV PUSH: CPT

## 2024-01-02 PROCEDURE — 84075 ASSAY ALKALINE PHOSPHATASE: CPT

## 2024-01-02 PROCEDURE — 85025 COMPLETE CBC W/AUTO DIFF WBC: CPT

## 2024-01-02 PROCEDURE — 96372 THER/PROPH/DIAG INJ SC/IM: CPT

## 2024-01-02 PROCEDURE — 83880 ASSAY OF NATRIURETIC PEPTIDE: CPT

## 2024-01-02 PROCEDURE — 5A09457 ASSISTANCE WITH RESPIRATORY VENTILATION, 24-96 CONSECUTIVE HOURS, CONTINUOUS POSITIVE AIRWAY PRESSURE: ICD-10-PCS | Performed by: INTERNAL MEDICINE

## 2024-01-02 PROCEDURE — 80053 COMPREHEN METABOLIC PANEL: CPT

## 2024-01-02 PROCEDURE — 71045 X-RAY EXAM CHEST 1 VIEW: CPT

## 2024-01-02 PROCEDURE — 85610 PROTHROMBIN TIME: CPT

## 2024-01-02 PROCEDURE — 93005 ELECTROCARDIOGRAM TRACING: CPT | Performed by: STUDENT IN AN ORGANIZED HEALTH CARE EDUCATION/TRAINING PROGRAM

## 2024-01-02 PROCEDURE — 82803 BLOOD GASES ANY COMBINATION: CPT

## 2024-01-02 RX ORDER — FUROSEMIDE 10 MG/ML
60 INJECTION INTRAMUSCULAR; INTRAVENOUS ONCE
Status: COMPLETED | OUTPATIENT
Start: 2024-01-02 | End: 2024-01-02

## 2024-01-02 RX ORDER — HYDROXYZINE HYDROCHLORIDE 50 MG/ML
50 INJECTION, SOLUTION INTRAMUSCULAR ONCE
Status: COMPLETED | OUTPATIENT
Start: 2024-01-02 | End: 2024-01-02

## 2024-01-02 RX ADMIN — FUROSEMIDE 60 MG: 10 INJECTION, SOLUTION INTRAMUSCULAR; INTRAVENOUS at 22:40

## 2024-01-02 RX ADMIN — HYDROXYZINE HYDROCHLORIDE 50 MG: 50 INJECTION, SOLUTION INTRAMUSCULAR at 21:56

## 2024-01-02 ASSESSMENT — PAIN - FUNCTIONAL ASSESSMENT: PAIN_FUNCTIONAL_ASSESSMENT: NONE - DENIES PAIN

## 2024-01-02 NOTE — ED PROVIDER NOTES
[facility] ED      CHIEF COMPLAINT  Shortness of Breath (EMS states patient wife called and reported patient was having SOB. Patient stated he did not want to go to ER and would rather just \"blow his brains out\". EMS brought to ER per wife request. )       HISTORY OF PRESENT ILLNESS  Igor Ann is a 53 y.o. male  who presents to the ED complaining of shortness of breath that started this morning.  Patient has been evaluated in one of our related emergency department 7 times in the last month for similar complaints.  States he is uncertain when his last dialysis was.  Wears CPAP at night and has been wearing it during the day as well due to his shortness of breath.  Today due to shortness of breath wife called EMS and he was refusing to come, however he then threatened to shoot himself in the head at which point EMS transported him due to protocol.  On arrival patient is on a nonrebreather satting 100% stating that he feels very short of breath.  He is requesting CPAP at this time and stating over and over again that he \"needs more air.\"  Denies any pain anywhere.    No other complaints, modifying factors or associated symptoms.     I have reviewed the following from the nursing documentation.    Past Medical History:   Diagnosis Date    Ambulatory dysfunction     walker for long distances, SOB with distance    Aortic stenosis     echo 2017    Arthritis     hands and hips    Asthma     Bilateral hilar adenopathy syndrome 06/03/2013    CAD (coronary artery disease)     Dr. Javier Chanel [facility]    Cardiomyopathy ([facility]) 04/19/2019    EF= 43%    CHF (congestive heart failure) ([facility])     Chronic pain     COPD (chronic obstructive pulmonary disease) ([facility])     pulmonology Dr. Batista    CVA (cerebral vascular accident) ([facility]) 05/21/2017    Depression     Diabetes mellitus ([facility])     borderline    Difficult intravenous access     Emphysema of lung ([facility])     ESRD (end stage renal disease) on dialysis ([facility])   K/uL    Eosinophils Absolute 0.3 0.0 - 0.6 K/uL    Basophils Absolute 0.1 0.0 - 0.2 K/uL   CMP w/ Reflex to MG   Result Value Ref Range    Sodium 134 (L) 136 - 145 mmol/L    Chloride 88 (L) 99 - 110 mmol/L    CO2 27 21 - 32 mmol/L    Anion Gap 19 (H) 3 - 16    Glucose 197 (H) 70 - 99 mg/dL    BUN 72 (H) 7 - 20 mg/dL    Creatinine 8.5 (HH) 0.9 - 1.3 mg/dL    Est, Glom Filt Rate 7 (A) >60    Calcium 8.9 8.3 - 10.6 mg/dL    Total Protein 7.1 6.4 - 8.2 g/dL    Albumin 4.1 3.4 - 5.0 g/dL    Albumin/Globulin Ratio 1.4 1.1 - 2.2    Total Bilirubin 0.3 0.0 - 1.0 mg/dL   Troponin   Result Value Ref Range    Troponin, High Sensitivity 292 (H) 0 - 22 ng/L   Troponin   Result Value Ref Range    Troponin, High Sensitivity 285 (H) 0 - 22 ng/L   BNP   Result Value Ref Range    Pro-BNP >70,000 (H) 0 - 124 pg/mL   Protime-INR   Result Value Ref Range    Protime 13.7 11.5 - 14.8 sec    INR 1.05 0.84 - 1.16   ALT   Result Value Ref Range    ALT 11 10 - 40 U/L   AST   Result Value Ref Range    AST 11 (L) 15 - 37 U/L   Alkaline Phosphatase   Result Value Ref Range    Alkaline Phosphatase 137 (H) 40 - 129 U/L   Potassium   Result Value Ref Range    Potassium 4.4 3.5 - 5.1 mmol/L   SPECIMEN REJECTION   Result Value Ref Range    Rejected Test VBG     Reason for Rejection see below    Blood Gas, Venous   Result Value Ref Range    pH, Blade 7.359 7.350 - 7.450    pCO2, Blade 50.6 (H) 40.0 - 50.0 mmHg    pO2, Blade 34.9 25.0 - 40.0 mmHg    HCO3, Venous 27.9 23.0 - 29.0 mmol/L    Base Excess, Blade 1.9 -3.0 - 3.0 mmol/L    O2 Sat, Blade 64 Not Established %    Carboxyhemoglobin 1.3 0.0 - 1.5 %    MetHgb, Blade 0.3 <1.5 %    TC02 (Calc), Blade 29 Not Established mmol/L    O2 Content, Blade 8 Not Established VOL %    O2 Therapy Unknown    EKG 12 Lead   Result Value Ref Range    Ventricular Rate 105 BPM    Atrial Rate 105 BPM    P-R Interval 166 ms    QRS Duration 96 ms    Q-T Interval 352 ms    QTc Calculation (Bazett) 465 ms    P Axis 82 degrees    R Axis -17

## 2024-01-03 PROBLEM — R06.00 DYSPNEA AND RESPIRATORY ABNORMALITIES: Status: ACTIVE | Noted: 2022-07-17

## 2024-01-03 PROBLEM — R06.89 DYSPNEA AND RESPIRATORY ABNORMALITIES: Status: ACTIVE | Noted: 2022-07-17

## 2024-01-03 PROBLEM — F41.9 ANXIETY: Status: ACTIVE | Noted: 2024-01-03

## 2024-01-03 PROBLEM — I50.22 CHRONIC SYSTOLIC CHF (CONGESTIVE HEART FAILURE) (HCC): Status: ACTIVE | Noted: 2024-01-03

## 2024-01-03 LAB
ANION GAP SERPL CALCULATED.3IONS-SCNC: 19 MMOL/L (ref 3–16)
BASOPHILS # BLD: 0 K/UL (ref 0–0.2)
BASOPHILS NFR BLD: 0.7 %
BUN SERPL-MCNC: 81 MG/DL (ref 7–20)
CALCIUM SERPL-MCNC: 8.7 MG/DL (ref 8.3–10.6)
CHLORIDE SERPL-SCNC: 92 MMOL/L (ref 99–110)
CO2 SERPL-SCNC: 23 MMOL/L (ref 21–32)
CREAT SERPL-MCNC: 9.5 MG/DL (ref 0.9–1.3)
DEPRECATED RDW RBC AUTO: 16.9 % (ref 12.4–15.4)
EKG ATRIAL RATE: 105 BPM
EKG DIAGNOSIS: NORMAL
EKG P AXIS: 82 DEGREES
EKG P-R INTERVAL: 166 MS
EKG Q-T INTERVAL: 352 MS
EKG QRS DURATION: 96 MS
EKG QTC CALCULATION (BAZETT): 465 MS
EKG R AXIS: -17 DEGREES
EKG T AXIS: 138 DEGREES
EKG VENTRICULAR RATE: 105 BPM
EOSINOPHIL # BLD: 0.3 K/UL (ref 0–0.6)
EOSINOPHIL NFR BLD: 4.6 %
FLUAV RNA RESP QL NAA+PROBE: NOT DETECTED
FLUBV RNA RESP QL NAA+PROBE: NOT DETECTED
GFR SERPLBLD CREATININE-BSD FMLA CKD-EPI: 6 ML/MIN/{1.73_M2}
GLUCOSE BLD-MCNC: 100 MG/DL (ref 70–99)
GLUCOSE BLD-MCNC: 151 MG/DL (ref 70–99)
GLUCOSE BLD-MCNC: 76 MG/DL (ref 70–99)
GLUCOSE BLD-MCNC: 81 MG/DL (ref 70–99)
GLUCOSE SERPL-MCNC: 134 MG/DL (ref 70–99)
HCT VFR BLD AUTO: 26.5 % (ref 40.5–52.5)
HGB BLD-MCNC: 8.5 G/DL (ref 13.5–17.5)
LYMPHOCYTES # BLD: 0.8 K/UL (ref 1–5.1)
LYMPHOCYTES NFR BLD: 13 %
MCH RBC QN AUTO: 29.6 PG (ref 26–34)
MCHC RBC AUTO-ENTMCNC: 31.9 G/DL (ref 31–36)
MCV RBC AUTO: 92.8 FL (ref 80–100)
MONOCYTES # BLD: 0.6 K/UL (ref 0–1.3)
MONOCYTES NFR BLD: 10.3 %
NEUTROPHILS # BLD: 4.2 K/UL (ref 1.7–7.7)
NEUTROPHILS NFR BLD: 71.4 %
PERFORMED ON: ABNORMAL
PERFORMED ON: ABNORMAL
PERFORMED ON: NORMAL
PERFORMED ON: NORMAL
PLATELET # BLD AUTO: 261 K/UL (ref 135–450)
PMV BLD AUTO: 8 FL (ref 5–10.5)
POTASSIUM SERPL-SCNC: 4.7 MMOL/L (ref 3.5–5.1)
RBC # BLD AUTO: 2.85 M/UL (ref 4.2–5.9)
RSV AG NOSE QL: NEGATIVE
SARS-COV-2 RNA RESP QL NAA+PROBE: NOT DETECTED
SODIUM SERPL-SCNC: 134 MMOL/L (ref 136–145)
WBC # BLD AUTO: 5.8 K/UL (ref 4–11)

## 2024-01-03 PROCEDURE — 6370000000 HC RX 637 (ALT 250 FOR IP): Performed by: INTERNAL MEDICINE

## 2024-01-03 PROCEDURE — 2700000000 HC OXYGEN THERAPY PER DAY

## 2024-01-03 PROCEDURE — 2060000000 HC ICU INTERMEDIATE R&B

## 2024-01-03 PROCEDURE — 6360000002 HC RX W HCPCS: Performed by: INTERNAL MEDICINE

## 2024-01-03 PROCEDURE — 36415 COLL VENOUS BLD VENIPUNCTURE: CPT

## 2024-01-03 PROCEDURE — 6370000000 HC RX 637 (ALT 250 FOR IP): Performed by: STUDENT IN AN ORGANIZED HEALTH CARE EDUCATION/TRAINING PROGRAM

## 2024-01-03 PROCEDURE — 99223 1ST HOSP IP/OBS HIGH 75: CPT | Performed by: INTERNAL MEDICINE

## 2024-01-03 PROCEDURE — 94660 CPAP INITIATION&MGMT: CPT

## 2024-01-03 PROCEDURE — 85025 COMPLETE CBC W/AUTO DIFF WBC: CPT

## 2024-01-03 PROCEDURE — 87807 RSV ASSAY W/OPTIC: CPT

## 2024-01-03 PROCEDURE — 93010 ELECTROCARDIOGRAM REPORT: CPT | Performed by: INTERNAL MEDICINE

## 2024-01-03 PROCEDURE — 87636 SARSCOV2 & INF A&B AMP PRB: CPT

## 2024-01-03 PROCEDURE — 94640 AIRWAY INHALATION TREATMENT: CPT

## 2024-01-03 PROCEDURE — 94761 N-INVAS EAR/PLS OXIMETRY MLT: CPT

## 2024-01-03 PROCEDURE — 2580000003 HC RX 258: Performed by: INTERNAL MEDICINE

## 2024-01-03 PROCEDURE — 80048 BASIC METABOLIC PNL TOTAL CA: CPT

## 2024-01-03 RX ORDER — INSULIN LISPRO 100 [IU]/ML
0-4 INJECTION, SOLUTION INTRAVENOUS; SUBCUTANEOUS NIGHTLY
Status: DISCONTINUED | OUTPATIENT
Start: 2024-01-03 | End: 2024-01-04

## 2024-01-03 RX ORDER — PREDNISONE 20 MG/1
40 TABLET ORAL DAILY
Status: DISCONTINUED | OUTPATIENT
Start: 2024-01-04 | End: 2024-01-06 | Stop reason: HOSPADM

## 2024-01-03 RX ORDER — SODIUM CHLORIDE 0.9 % (FLUSH) 0.9 %
5-40 SYRINGE (ML) INJECTION PRN
Status: DISCONTINUED | OUTPATIENT
Start: 2024-01-03 | End: 2024-01-06 | Stop reason: HOSPADM

## 2024-01-03 RX ORDER — SODIUM CHLORIDE 0.9 % (FLUSH) 0.9 %
5-40 SYRINGE (ML) INJECTION EVERY 12 HOURS SCHEDULED
Status: DISCONTINUED | OUTPATIENT
Start: 2024-01-03 | End: 2024-01-06 | Stop reason: HOSPADM

## 2024-01-03 RX ORDER — IPRATROPIUM BROMIDE AND ALBUTEROL SULFATE 2.5; .5 MG/3ML; MG/3ML
2 SOLUTION RESPIRATORY (INHALATION) ONCE
Status: COMPLETED | OUTPATIENT
Start: 2024-01-03 | End: 2024-01-03

## 2024-01-03 RX ORDER — IPRATROPIUM BROMIDE AND ALBUTEROL SULFATE 2.5; .5 MG/3ML; MG/3ML
2 SOLUTION RESPIRATORY (INHALATION)
Status: DISCONTINUED | OUTPATIENT
Start: 2024-01-04 | End: 2024-01-04

## 2024-01-03 RX ORDER — DILTIAZEM HYDROCHLORIDE EXTENDED-RELEASE TABLETS 180 MG/1
180 TABLET, EXTENDED RELEASE ORAL ONCE
COMMUNITY

## 2024-01-03 RX ORDER — SODIUM CHLORIDE 9 MG/ML
INJECTION, SOLUTION INTRAVENOUS PRN
Status: DISCONTINUED | OUTPATIENT
Start: 2024-01-03 | End: 2024-01-06 | Stop reason: HOSPADM

## 2024-01-03 RX ORDER — INSULIN LISPRO 100 [IU]/ML
0-4 INJECTION, SOLUTION INTRAVENOUS; SUBCUTANEOUS
Status: DISCONTINUED | OUTPATIENT
Start: 2024-01-03 | End: 2024-01-04

## 2024-01-03 RX ORDER — HEPARIN SODIUM 1000 [USP'U]/ML
3600 INJECTION, SOLUTION INTRAVENOUS; SUBCUTANEOUS PRN
Status: DISCONTINUED | OUTPATIENT
Start: 2024-01-03 | End: 2024-01-06 | Stop reason: HOSPADM

## 2024-01-03 RX ORDER — ACETAMINOPHEN 650 MG/1
650 SUPPOSITORY RECTAL EVERY 6 HOURS PRN
Status: DISCONTINUED | OUTPATIENT
Start: 2024-01-03 | End: 2024-01-04

## 2024-01-03 RX ORDER — ONDANSETRON 2 MG/ML
4 INJECTION INTRAMUSCULAR; INTRAVENOUS EVERY 6 HOURS PRN
Status: DISCONTINUED | OUTPATIENT
Start: 2024-01-03 | End: 2024-01-06 | Stop reason: HOSPADM

## 2024-01-03 RX ORDER — ALPRAZOLAM 0.25 MG/1
0.25 TABLET ORAL 2 TIMES DAILY PRN
Status: DISCONTINUED | OUTPATIENT
Start: 2024-01-03 | End: 2024-01-04

## 2024-01-03 RX ORDER — HYDROXYZINE HYDROCHLORIDE 10 MG/1
10 TABLET, FILM COATED ORAL 3 TIMES DAILY PRN
Status: DISCONTINUED | OUTPATIENT
Start: 2024-01-03 | End: 2024-01-06 | Stop reason: HOSPADM

## 2024-01-03 RX ORDER — ACETAMINOPHEN 325 MG/1
650 TABLET ORAL EVERY 6 HOURS PRN
Status: DISCONTINUED | OUTPATIENT
Start: 2024-01-03 | End: 2024-01-04

## 2024-01-03 RX ORDER — POLYETHYLENE GLYCOL 3350 17 G/17G
17 POWDER, FOR SOLUTION ORAL DAILY PRN
Status: DISCONTINUED | OUTPATIENT
Start: 2024-01-03 | End: 2024-01-06 | Stop reason: HOSPADM

## 2024-01-03 RX ORDER — ONDANSETRON 4 MG/1
4 TABLET, ORALLY DISINTEGRATING ORAL EVERY 8 HOURS PRN
Status: DISCONTINUED | OUTPATIENT
Start: 2024-01-03 | End: 2024-01-06 | Stop reason: HOSPADM

## 2024-01-03 RX ADMIN — EPOETIN ALFA-EPBX 10000 UNITS: 10000 INJECTION, SOLUTION INTRAVENOUS; SUBCUTANEOUS at 18:55

## 2024-01-03 RX ADMIN — IPRATROPIUM BROMIDE AND ALBUTEROL SULFATE 1 DOSE: 2.5; .5 SOLUTION RESPIRATORY (INHALATION) at 23:53

## 2024-01-03 RX ADMIN — ALPRAZOLAM 0.25 MG: 0.25 TABLET ORAL at 10:56

## 2024-01-03 RX ADMIN — INSULIN LISPRO 1 UNITS: 100 INJECTION, SOLUTION INTRAVENOUS; SUBCUTANEOUS at 10:56

## 2024-01-03 RX ADMIN — IPRATROPIUM BROMIDE AND ALBUTEROL SULFATE 2 DOSE: 2.5; .5 SOLUTION RESPIRATORY (INHALATION) at 00:27

## 2024-01-03 RX ADMIN — Medication 10 ML: at 11:05

## 2024-01-03 RX ADMIN — HYDROXYZINE HYDROCHLORIDE 10 MG: 10 TABLET ORAL at 10:56

## 2024-01-03 RX ADMIN — ALPRAZOLAM 0.25 MG: 0.25 TABLET ORAL at 19:32

## 2024-01-03 NOTE — CONSULTS
[facility] Consult Note           Reason for Consult:  End stage kidney disease  Requesting Physician:  Dr. Ellis    Chief Complaint:  Shortness of breath  History Obtained From:  patient, electronic medical record    History of Present Ilness:    53 year old male with ESKD on HD MWF admitted with shortness of breath.  We have been asked to assist in further dialysis care.    Last had HD on 12/30 with 1.9 liters removed, post-weight of 91.5 kg.  Started having sudden shortness of breath, dry cough yesterday PTA.  Required bi-pap on admission.  +weak, intake reduced, no fevers.    Past Medical History:        Diagnosis Date    Ambulatory dysfunction     walker for long distances, SOB with distance    Aortic stenosis     echo 2017    Arthritis     hands and hips    Asthma     Bilateral hilar adenopathy syndrome 06/03/2013    CAD (coronary artery disease)     Dr. Javier Chanel [facility]    Cardiomyopathy ([facility]) 04/19/2019    EF= 43%    CHF (congestive heart failure) ([facility])     Chronic pain     COPD (chronic obstructive pulmonary disease) ([facility])     pulmonology Dr. Batista    CVA (cerebral vascular accident) ([facility]) 05/21/2017    Depression     Diabetes mellitus ([facility])     borderline    Difficult intravenous access     Emphysema of lung ([facility])     ESRD (end stage renal disease) on dialysis ([facility])     MWF    Fear of needles     Gastric ulcer     GERD (gastroesophageal reflux disease)     Heart valve problem     bicuspic valve    Hemodialysis patient ([facility])     History of spinal fracture     work incident    Hx of blood clots     Bilateral lower extremities; stents in place    Hyperlipidemia     Hypertension     MI (myocardial infarction) ([facility]) 2019    has had 9 MIs. 2019 was the last    Neuromuscular disorder ([facility])     due to CVA    Numbness and tingling in left arm     from fistula    Pneumonia     PONV (postoperative nausea and vomiting)     Prolonged emergence from general

## 2024-01-03 NOTE — ED PROVIDER NOTES
left perihilar infiltrate.  This is suspicious for pneumonia.   Examination otherwise unchanged.              ED Course as of 01/03/24 0324 Wed Jan 03, 2024 0116 Signed out to me by the prior physician.  Patient is end-stage renal disease with incomplete dialysis.  Supposed to be Monday Wednesday and Friday but he does not know when his last dialysis was.  He presents with shortness of breath and is on CPAP.  Lasix given by the prior physician and signed out to me for recheck following Lasix but we are unable to wean the patient from CPAP.  Patient has been seen 4 times in 3 days and the care plan includes giving Lasix prior to admission and is well-known to this facility for similar presentations [AM]   0259 States he cannot breathe without the CPAP, but we will try to titrate off [AM]   0300 His troponin is elevated, but given his dialysis status, this may be his baseline. [AM]   0322 Patient is unable to tolerate being off of CPAP.  Will plan admission [AM]      ED Course User Index  [AM] Pippa Perales MD        Medications   hydrOXYzine (VISTARIL) injection 50 mg (50 mg IntraMUSCular Given 1/2/24 2156)   furosemide (LASIX) injection 60 mg (60 mg IntraVENous Given 1/2/24 2240)   ipratropium 0.5 mg-albuterol 2.5 mg (DUONEB) nebulizer solution 2 Dose (2 Doses Inhalation Given 1/3/24 0027)            Impression:    ICD-10-CM    1. Dyspnea and respiratory abnormalities  R06.00     R06.89              Please note that this chart was created with the assistance of voice recognition software and may contain errors in word use, grammar, or punctuation.  For any questions, contact the dictating physician.      Pippa Perales MD  01/03/24 0324

## 2024-01-03 NOTE — ED NOTES
PS sent to attending physician regarding pt being without IV access and limited access points. Pt is not cooperative with additional attempts, yelling and moving.

## 2024-01-03 NOTE — ED NOTES
Spoke with patient's wife, unsure if patient wears bipap or cpap at home. Also states that patient last dialysis treatment was this past Saturday, the 30th.

## 2024-01-03 NOTE — CONSULTS
place    Hyperlipidemia     Hypertension     MI (myocardial infarction) ([facility]) 2019    has had 9 MIs. 2019 was the last    Neuromuscular disorder ([facility])     due to CVA    Numbness and tingling in left arm     from fistula    Pneumonia     PONV (postoperative nausea and vomiting)     Prolonged emergence from general anesthesia     States requires more medication than most people    Sleep apnea     Uses CPAP    Stroke ([facility])     7mm thalamic cva 2017 deficts left side, left side weakness    TIA (transient ischemic attack)     Unspecified diseases of blood and blood-forming organs      PAST SURGICAL HISTORY:  Past Surgical History:   Procedure Laterality Date    AORTIC VALVE REPLACEMENT N/A 10/15/2019    TRANSCATHETER AORTIC VALVE REPLACEMENT FEMORAL APPROACH performed by Dion Holland MD at [facility] CVOR    CATHETER REMOVAL Right 7/2/2019    PERITONEAL DIALYSIS CATHETER REMOVAL performed by Remi Kincaid MD at [facility] OR    COLONOSCOPY      COLONOSCOPY  2/29/2015    WN    CORONARY ANGIOPLASTY WITH STENT PLACEMENT  05/26/15    CYST REMOVAL  08/14/2013    EXCISION CYSTS, NECK X2 AND ABDOMINAL benign    DIAGNOSTIC CARDIAC CATH LAB PROCEDURE      DIALYSIS FISTULA CREATION Left 10/30/2017    LEFT BRACHIAL CEPHALIC FISTULA    DIALYSIS FISTULA CREATION Left 3/27/2019    LIGATION  AV FISTULA performed by Brenden Rosario MD at [facility] OR    ENDOSCOPY, COLON, DIAGNOSTIC      FOOT DEBRIDEMENT Right 5/26/2023    INCISION AND DEBRIDEMENT RIGHT FOOT WITH performed by Cely Rodriguez DPM at [facility] OR    FOOT SURGERY Right 5/26/2023    RIGHT FIFTH RAY AMPUTATION performed by Cely Rodriguez DPM at [facility] OR    IR TUNNELED CATHETER PLACEMENT GREATER THAN 5 YEARS  3/21/2022    IR TUNNELED CATHETER PLACEMENT GREATER THAN 5 YEARS 3/21/2022 [facility] SPECIAL PROCEDURES    IR TUNNELED CATHETER PLACEMENT GREATER THAN 5 YEARS  4/21/2022    IR TUNNELED CATHETER PLACEMENT GREATER THAN 5 YEARS 4/21/2022 [facility] SPECIAL PROCEDURES    IR TUNNELED  lispro  0-4 Units SubCUTAneous TID     insulin lispro  0-4 Units SubCUTAneous Nightly     Continuous Infusions:   sodium chloride       PRN Meds:  sodium chloride flush, sodium chloride, ondansetron **OR** ondansetron, polyethylene glycol, acetaminophen **OR** acetaminophen    ALLERGIES:  Patient is allergic to morphine.    REVIEW OF SYSTEMS:  Constitutional: Negative for fever  HENT: Negative for sore throat  Eyes: Negative for redness   Respiratory:dyspnea, cough  Cardiovascular: Negative for chest pain  Gastrointestinal: Negative for vomiting, diarrhea   Genitourinary: Negative for hematuria   Musculoskeletal: Negative for arthralgias   Skin: Negative for rash  Neurological: Negative for syncope  Hematological: Negative for adenopathy  Psychiatric/Behavorial: Negative for anxiety    PHYSICAL EXAM:  Blood pressure (!) 159/90, pulse 97, temperature 97.8 °F (36.6 °C), temperature source Oral, resp. rate 22, height 1.753 m (5' 9\"), weight 95.3 kg (210 lb), SpO2 98 %.' on RA  Gen: No distress.   Eyes: PERRL. No sclera icterus. No conjunctival injection.   ENT: No discharge. Pharynx clear.   Neck: Trachea midline. No obvious mass.    Resp: + rhonchi. No dullness on percussion.  CV: Regular rate. Regular rhythm. No murmur or rub. No edema. Peripheral pulses are 2+.  Capillary refill is less than 3 seconds.  GI: Non-tender. Non-distended. No hernia.   Skin: Warm and dry. No nodule on exposed extremities.   Lymph: No cervical LAD. No supraclavicular LAD.   M/S: No cyanosis. No joint deformity. No clubbing.   Neuro: Awake. Alert. Moves all four extremities.   Psych: Oriented x 3. No anxiety.     LABS:  CBC:   Recent Labs     01/02/24  1750 01/03/24  0714   WBC 9.9 5.8   HGB 9.8* 8.5*   HCT 29.8* 26.5*   MCV 90.0 92.8    261     BMP:   Recent Labs     01/02/24 1750 01/02/24 1922 01/03/24  0714   *  --  134*   K  --  4.4 4.7   CL 88*  --  92*   CO2 27  --  23   BUN 72*  --  81*   CREATININE 8.5*  --  9.5*

## 2024-01-03 NOTE — PROGRESS NOTES
01/03/24 1438   NIV Type   NIV Started/Stopped On   Equipment Type v60   Mode Bilevel   Mask Type Full face mask   Mask Size Medium   Assessment   Pulse 90   Respirations 18   SpO2 100 %   Comfort Level Good   Using Accessory Muscles No   Mask Compliance Good   Skin Assessment Clean, dry, & intact   Skin Protection for O2 Device Yes   Location Nose   Settings/Measurements   PIP Observed 18 cm H20   IPAP 18 cmH20   CPAP/EPAP 8 cmH2O   Vt (Measured) 516 mL   Rate Ordered 16   FiO2  30 %   Minute Volume (L/min) 14.4 Liters   Mask Leak (lpm) 12 lpm   Patient's Home Machine No

## 2024-01-03 NOTE — PROGRESS NOTES
01/02/24 2034   NIV Type   Mode Bilevel   Mask Type Full face mask   Mask Size Medium   Assessment   Pulse 98   Respirations 24   SpO2 100 %   Settings/Measurements   IPAP 18 cmH20   CPAP/EPAP 8 cmH2O   Vt (Measured) 511 mL   Rate Ordered 16   FiO2  40 %

## 2024-01-03 NOTE — PROGRESS NOTES
01/02/24 1241   NIV Type   Mode Bilevel   Mask Type Full face mask   Mask Size Medium   Assessment   Respirations 20   SpO2 100 %   Settings/Measurements   IPAP 18 cmH20   CPAP/EPAP 8 cmH2O   Vt (Measured) 607 mL   Rate Ordered 16   FiO2  40 %

## 2024-01-03 NOTE — DISCHARGE INSTRUCTIONS
Resume HD per schedule  Avoid excessive CPAP          Heart Failure Resources:  Heart Failure Interactive Workbook:  Go to https://Eco-Vacay.VeriFone/publication/?o=339719 for a Free Heart Failure Interactive Workbook provided by The American Heart Association. This interactive workbook will provide information on Healthier Living with Heart Failure. Please copy and paste link into search bar. Use your mouse to scroll through the pages.    HF [place] stephanie:   Heart Failure Free smart phone stephanie available for iPhone and Android download. Use your phone to track sodium intake, fluid intake, symptoms, and weight.     Low Sodium Diet / Recipes:  Go to www.[employer]Ask.com website for “renal” diet which is Low Sodium! [employer] is a dialysis company, but this website offers free seasonal cookbooks. Each quarter, they will release 25-30 new recipes with a breakdown of calories, sodium, and glucose. You can also go to www."[employer]/recipes website for free recipes.     Discharge Instruction Video:  Scan the QR code below with your camera and click the canva.com link to open the video and watch educational information on Heart Failure and Medications from one of our nurses.   [email or web address]/[email or web address]/edit    Home Exercise Program:   Identification of Green/Yellow/Red zones:  You should be able to identify when you feel good (green zone), if you have 1-2 symptoms of HF (yellow zone), or if you are in need of medical attention (red zone).  In your CHF education folder you were provided a “stop light tool” to outline this information.     We want to you to rate your exertion levels:    Our therapy team has discussed means of identification with you such as the \"John scale.\"  The John rating scale ranges from 6 to 20, where 6 means \"no exertion at all\" and 20 means \"maximal exertion.\" The goal is to use this to gauge how much effort it is taking for you to do your normal daily tasks.   You

## 2024-01-03 NOTE — PROGRESS NOTES
01/03/24 0351   NIV Type   Mode Bilevel   Mask Type Full face mask   Mask Size Medium   Assessment   Respirations 21   SpO2 100 %   Settings/Measurements   IPAP 18 cmH20   CPAP/EPAP 8 cmH2O   Vt (Measured) 627 mL   Rate Ordered 16   FiO2  40 %

## 2024-01-03 NOTE — ED NOTES
Pt medicated with 60mg lasix over night, still no void to this point. Bladder scan 72ml. Pt states no significant changes to urine output recently.

## 2024-01-03 NOTE — PROGRESS NOTES
HOSPITALIST  ADMIT ACCEPT NOTE    1/3/2024 3:30 AM    Patient Information: ALLEN HARDWICK   Date of Admit:  1/2/2024  Primary Care Physician:  Jessenia Morrison, JAY - CNP    Chief complaint:    Chief Complaint   Patient presents with    Shortness of Breath     EMS states patient wife called and reported patient was having SOB. Patient stated he did not want to go to ER and would rather just \"blow his brains out\". EMS brought to ER per wife request.        History of Present Illness:  ALLEN HARDWICK is a 53 y.o. male     ER handoff:  Acute on chronic respiratory failure requiring NIPPV    Patient is an ESRD patient who tends to be noncompliant and does not complete his courses of dialysis. He has been seen in the emergency department 7 times in the last month for similar complaints. Presents with shortness of breath and hypoxia requesting CPAP and does improve with that. Same thing happened today. VBG did show some mild hypercapnia. I attempted to get him off CPAP, but he does quickly desat on room air. Oxygen saturation seemed appropriate on around 8 L oxygen, but he has extreme increased work of breathing and again begs for CPAP.  He states that he feels he has been dependent on CPAP for months. He did make a suicidal threat to EMS, which he tells me he is just due to his chronic shortness of breath and feeling that he needs CPAP all the time.  Chest x-ray did show questionable pneumonia, however he tells me he does not have any productive cough or fevers, just the shortness of breath. I suspect fluid overload is the cause. No other emergent need for dialysis based on lab work.  Would like to admit as I cannot get him off of CPAP.     Gave Lasix as per management plan but still requiring BiPap.    Plan:  Admit for urgent HD        Luciano Ellis MD         Detail Level: Detailed Additional Notes: These do not appear to be actual cafe au lait macules since the perimeter is not sharply outlined. Additional Notes: Pt has a classic cafe au lait patch on her chest. She has many other pigmented macules but not distinctly cafe au lait. The others are darker and not sharply outlined. Pt has no axillary or inguinal freckling nor any neurofibromas on exam today. Photos were taken as a baseline but at this time, pt does not meet criteria for neurofibromatosis. Mother will continue to monitor and take photos on her own. Render Risk Assessment In Note?: no

## 2024-01-03 NOTE — ED NOTES
Have attempted to reconcile patient home medications. He is unsure of what all he takes, states he has a list at home but does not know where it is. Is not able to recall the dosages or last time he took over half of his previously listed home medications.

## 2024-01-03 NOTE — PROGRESS NOTES
Patient arrived to room 319 from ED. Vitals are stable. Patient currently on Bi-Pap. Writer spoke with Primary Decision maker to verify medications as well as completed admission questions.    Per primary decision maker Patient is non compliant with medications and hasn't taken any for months.    Patient oriented to room. Call light within reach.

## 2024-01-03 NOTE — PROGRESS NOTES
01/03/24 1146   NIV Type   Equipment Type v60   Mode Bilevel   Mask Type Full face mask   Mask Size Medium   Bonnet size Medium   Assessment   Pulse 91   Respirations 20   SpO2 100 %   Comfort Level Good   Using Accessory Muscles No   Mask Compliance Good   Skin Assessment Clean, dry, & intact   Skin Protection for O2 Device Yes   Breath Sounds   Breath Sounds Bilateral Diminished   Settings/Measurements   PIP Observed 18 cm H20   IPAP 18 cmH20   CPAP/EPAP 8 cmH2O   Vt (Measured) 598 mL   Rate Ordered 16   FiO2  30 %   Minute Volume (L/min) 12.6 Liters   Mask Leak (lpm) 2 lpm   Patient's Home Machine No   Alarm Settings   Alarms On Y   Oxygen Therapy/Pulse Ox   O2 Device PAP (positive airway pressure)

## 2024-01-03 NOTE — PROGRESS NOTES
01/03/24 0857   NIV Type   NIV Started/Stopped On   Equipment Type v60   Mode Bilevel   Mask Type Full face mask   Mask Size Medium   Assessment   Pulse 89   Respirations 20   SpO2 96 %   Comfort Level Good   Using Accessory Muscles No   Mask Compliance Good   Skin Assessment Clean, dry, & intact   Skin Protection for O2 Device Yes   Location Nose   Settings/Measurements   PIP Observed 18 cm H20   IPAP 18 cmH20   CPAP/EPAP 8 cmH2O   Vt (Measured) 687 mL   Rate Ordered 18   FiO2  40 %   Minute Volume (L/min) 9.3 Liters   Mask Leak (lpm) 22 lpm   Patient's Home Machine No   Alarm Settings   Alarms On Y   Low Pressure (cmH2O) 5 cmH2O   High Pressure (cmH2O) 40 cmH2O   Apnea (secs) 20 secs   RR Low (bpm) 10   RR High (bpm) 45 br/min   Oxygen Therapy/Pulse Ox   O2 Device PAP (positive airway pressure)

## 2024-01-03 NOTE — H&P
Hospital Medicine History & Physical      PCP: Jessenia Morrison, APRN - CNP    Date of Admission: 1/2/2024    Date of Service: Pt seen/examined on 1/3/2024     Chief Complaint:    Chief Complaint   Patient presents with    Shortness of Breath     EMS states patient wife called and reported patient was having SOB. Patient stated he did not want to go to ER and would rather just \"blow his brains out\". EMS brought to ER per wife request.          History Of Present Illness:      The patient is a 53 y.o. male with pmhx of COPD, CHF, chronic hypoxic respiratory failure, CAD, DM who presented to [facility] ED with complaint of shortness of breath and fluid overload . Pt has significant hx of coming off early during HD and multiple admissions for fluid overload . He had 7 visits to ER last month for similar complaints  He reports he his HD schedule was changed for holidays and started with sudden increasing sob and dry cough yesterday evening along with increasing anxiety. No fever or chills or chest pain . No changes in his home meds.   He repeatedly asked for bipap in ER and was also hypoxic. Currently comfortable on bipap and seen sitting up in bed in no distress      Past Medical History:        Diagnosis Date    Ambulatory dysfunction     walker for long distances, SOB with distance    Aortic stenosis     echo 2017    Arthritis     hands and hips    Asthma     Bilateral hilar adenopathy syndrome 06/03/2013    CAD (coronary artery disease)     Dr. Javier Chanel [facility]    Cardiomyopathy ([facility]) 04/19/2019    EF= 43%    CHF (congestive heart failure) ([facility])     Chronic pain     COPD (chronic obstructive pulmonary disease) ([facility])     pulmonology Dr. Batista    CVA (cerebral vascular accident) ([facility]) 05/21/2017    Depression     Diabetes mellitus ([facility])     borderline    Difficult intravenous access     Emphysema of lung ([facility])     ESRD (end stage renal disease) on dialysis ([facility])     MWF    Fear of needles     Gastric  regimen toprol XL and torsemide   -cannot tolerate ACEI/ARB, aldactone 2/2 to ESRD       #Elevated troponin   -292--> 285  -no CP   -EKG without acute ischemic changes   -likely 2/2 to renal function     #Chronic normocytic anemia   -denies any s/s of bleeding  -likely 2/2 to renal disease   -monitor CBC     #COPD without AE  -continue prn inhalers     #CAD  -on plavix, statin, BB, imdur     #DM type 2   -SSI   -monitor BG     #Hx of VTE   -on eliquis     #HX of CVA   -plavix, statin     #Depression   -continue home regimen     #ZAINAB   -uses CPAP nightly    # anxiety - add prn xanax and atarax temporarily     DVT Prophylaxis: Eliquis   Diet: ADULT DIET; Regular; 4 carb choices (60 gm/meal)  Code Status: Full Code    Ok for PCU   Dc home after HD    Gómez Hwang MD, 1/3/2024 10:55 AM

## 2024-01-03 NOTE — ED NOTES
0744- [facility] to [facility] for routine nephrology consult.   Dr. Tavares on call.

## 2024-01-04 LAB
GLUCOSE BLD-MCNC: 153 MG/DL (ref 70–99)
GLUCOSE BLD-MCNC: 261 MG/DL (ref 70–99)
GLUCOSE BLD-MCNC: 409 MG/DL (ref 70–99)
GLUCOSE BLD-MCNC: 494 MG/DL (ref 70–99)
GLUCOSE BLD-MCNC: 496 MG/DL (ref 70–99)
GLUCOSE BLD-MCNC: 498 MG/DL (ref 70–99)
PERFORMED ON: ABNORMAL

## 2024-01-04 PROCEDURE — 94640 AIRWAY INHALATION TREATMENT: CPT

## 2024-01-04 PROCEDURE — 6370000000 HC RX 637 (ALT 250 FOR IP): Performed by: INTERNAL MEDICINE

## 2024-01-04 PROCEDURE — 99233 SBSQ HOSP IP/OBS HIGH 50: CPT | Performed by: INTERNAL MEDICINE

## 2024-01-04 PROCEDURE — 2580000003 HC RX 258: Performed by: INTERNAL MEDICINE

## 2024-01-04 PROCEDURE — 2060000000 HC ICU INTERMEDIATE R&B

## 2024-01-04 PROCEDURE — 94761 N-INVAS EAR/PLS OXIMETRY MLT: CPT

## 2024-01-04 PROCEDURE — 94660 CPAP INITIATION&MGMT: CPT

## 2024-01-04 PROCEDURE — 99232 SBSQ HOSP IP/OBS MODERATE 35: CPT | Performed by: INTERNAL MEDICINE

## 2024-01-04 RX ORDER — ACETAMINOPHEN 650 MG/1
650 SUPPOSITORY RECTAL EVERY 6 HOURS PRN
Status: DISCONTINUED | OUTPATIENT
Start: 2024-01-04 | End: 2024-01-06 | Stop reason: HOSPADM

## 2024-01-04 RX ORDER — GLUCAGON 1 MG/ML
1 KIT INJECTION PRN
Status: DISCONTINUED | OUTPATIENT
Start: 2024-01-04 | End: 2024-01-06 | Stop reason: HOSPADM

## 2024-01-04 RX ORDER — IPRATROPIUM BROMIDE AND ALBUTEROL SULFATE 2.5; .5 MG/3ML; MG/3ML
1 SOLUTION RESPIRATORY (INHALATION) EVERY 4 HOURS PRN
Status: DISCONTINUED | OUTPATIENT
Start: 2024-01-04 | End: 2024-01-06 | Stop reason: HOSPADM

## 2024-01-04 RX ORDER — ACETAMINOPHEN 325 MG/1
650 TABLET ORAL EVERY 6 HOURS PRN
Status: DISCONTINUED | OUTPATIENT
Start: 2024-01-04 | End: 2024-01-06 | Stop reason: HOSPADM

## 2024-01-04 RX ORDER — METOPROLOL SUCCINATE 50 MG/1
50 TABLET, EXTENDED RELEASE ORAL NIGHTLY
Status: DISCONTINUED | OUTPATIENT
Start: 2024-01-04 | End: 2024-01-06 | Stop reason: HOSPADM

## 2024-01-04 RX ORDER — DILTIAZEM HYDROCHLORIDE 180 MG/1
180 CAPSULE, COATED, EXTENDED RELEASE ORAL DAILY
Status: DISCONTINUED | OUTPATIENT
Start: 2024-01-04 | End: 2024-01-06 | Stop reason: HOSPADM

## 2024-01-04 RX ORDER — INSULIN LISPRO 100 [IU]/ML
0-16 INJECTION, SOLUTION INTRAVENOUS; SUBCUTANEOUS
Status: DISCONTINUED | OUTPATIENT
Start: 2024-01-04 | End: 2024-01-06 | Stop reason: HOSPADM

## 2024-01-04 RX ORDER — BUSPIRONE HYDROCHLORIDE 10 MG/1
10 TABLET ORAL 3 TIMES DAILY
Status: DISCONTINUED | OUTPATIENT
Start: 2024-01-04 | End: 2024-01-06 | Stop reason: HOSPADM

## 2024-01-04 RX ORDER — INSULIN LISPRO 100 [IU]/ML
0-4 INJECTION, SOLUTION INTRAVENOUS; SUBCUTANEOUS NIGHTLY
Status: DISCONTINUED | OUTPATIENT
Start: 2024-01-04 | End: 2024-01-06 | Stop reason: HOSPADM

## 2024-01-04 RX ORDER — OXYCODONE HYDROCHLORIDE AND ACETAMINOPHEN 5; 325 MG/1; MG/1
1 TABLET ORAL ONCE
Status: COMPLETED | OUTPATIENT
Start: 2024-01-04 | End: 2024-01-05

## 2024-01-04 RX ORDER — QUETIAPINE FUMARATE 25 MG/1
12.5 TABLET, FILM COATED ORAL 2 TIMES DAILY
Status: DISCONTINUED | OUTPATIENT
Start: 2024-01-04 | End: 2024-01-06 | Stop reason: HOSPADM

## 2024-01-04 RX ORDER — DEXTROSE MONOHYDRATE 100 MG/ML
INJECTION, SOLUTION INTRAVENOUS CONTINUOUS PRN
Status: DISCONTINUED | OUTPATIENT
Start: 2024-01-04 | End: 2024-01-06 | Stop reason: HOSPADM

## 2024-01-04 RX ORDER — TRAZODONE HYDROCHLORIDE 50 MG/1
50 TABLET ORAL NIGHTLY
Status: DISCONTINUED | OUTPATIENT
Start: 2024-01-04 | End: 2024-01-06 | Stop reason: HOSPADM

## 2024-01-04 RX ORDER — DULOXETIN HYDROCHLORIDE 30 MG/1
30 CAPSULE, DELAYED RELEASE ORAL DAILY
Status: DISCONTINUED | OUTPATIENT
Start: 2024-01-04 | End: 2024-01-06 | Stop reason: HOSPADM

## 2024-01-04 RX ORDER — ISOSORBIDE MONONITRATE 30 MG/1
30 TABLET, EXTENDED RELEASE ORAL DAILY
Status: DISCONTINUED | OUTPATIENT
Start: 2024-01-04 | End: 2024-01-06 | Stop reason: HOSPADM

## 2024-01-04 RX ORDER — IPRATROPIUM BROMIDE AND ALBUTEROL SULFATE 2.5; .5 MG/3ML; MG/3ML
1 SOLUTION RESPIRATORY (INHALATION)
Status: DISCONTINUED | OUTPATIENT
Start: 2024-01-04 | End: 2024-01-06

## 2024-01-04 RX ORDER — PRAVASTATIN SODIUM 10 MG
20 TABLET ORAL NIGHTLY
Status: DISCONTINUED | OUTPATIENT
Start: 2024-01-04 | End: 2024-01-06 | Stop reason: HOSPADM

## 2024-01-04 RX ADMIN — BUSPIRONE HYDROCHLORIDE 10 MG: 10 TABLET ORAL at 13:42

## 2024-01-04 RX ADMIN — ISOSORBIDE MONONITRATE 30 MG: 30 TABLET, EXTENDED RELEASE ORAL at 15:19

## 2024-01-04 RX ADMIN — PRAVASTATIN SODIUM 20 MG: 10 TABLET ORAL at 20:59

## 2024-01-04 RX ADMIN — METOPROLOL SUCCINATE 50 MG: 50 TABLET, EXTENDED RELEASE ORAL at 20:59

## 2024-01-04 RX ADMIN — PREDNISONE 40 MG: 20 TABLET ORAL at 08:19

## 2024-01-04 RX ADMIN — APIXABAN 5 MG: 5 TABLET, FILM COATED ORAL at 15:19

## 2024-01-04 RX ADMIN — INSULIN LISPRO 16 UNITS: 100 INJECTION, SOLUTION INTRAVENOUS; SUBCUTANEOUS at 17:53

## 2024-01-04 RX ADMIN — IPRATROPIUM BROMIDE AND ALBUTEROL SULFATE 1 DOSE: 2.5; .5 SOLUTION RESPIRATORY (INHALATION) at 18:04

## 2024-01-04 RX ADMIN — QUETIAPINE FUMARATE 12.5 MG: 25 TABLET ORAL at 20:59

## 2024-01-04 RX ADMIN — IPRATROPIUM BROMIDE AND ALBUTEROL SULFATE 1 DOSE: 2.5; .5 SOLUTION RESPIRATORY (INHALATION) at 15:21

## 2024-01-04 RX ADMIN — HYDROXYZINE HYDROCHLORIDE 10 MG: 10 TABLET ORAL at 08:27

## 2024-01-04 RX ADMIN — APIXABAN 5 MG: 5 TABLET, FILM COATED ORAL at 20:59

## 2024-01-04 RX ADMIN — TRAZODONE HYDROCHLORIDE 50 MG: 50 TABLET ORAL at 20:59

## 2024-01-04 RX ADMIN — QUETIAPINE FUMARATE 12.5 MG: 25 TABLET ORAL at 09:44

## 2024-01-04 RX ADMIN — INSULIN LISPRO 4 UNITS: 100 INJECTION, SOLUTION INTRAVENOUS; SUBCUTANEOUS at 21:40

## 2024-01-04 RX ADMIN — IPRATROPIUM BROMIDE AND ALBUTEROL SULFATE 1 DOSE: 2.5; .5 SOLUTION RESPIRATORY (INHALATION) at 07:55

## 2024-01-04 RX ADMIN — Medication 10 ML: at 20:59

## 2024-01-04 RX ADMIN — HYDROXYZINE HYDROCHLORIDE 10 MG: 10 TABLET ORAL at 18:00

## 2024-01-04 RX ADMIN — BUSPIRONE HYDROCHLORIDE 10 MG: 10 TABLET ORAL at 20:59

## 2024-01-04 RX ADMIN — IPRATROPIUM BROMIDE AND ALBUTEROL SULFATE 1 DOSE: 2.5; .5 SOLUTION RESPIRATORY (INHALATION) at 11:30

## 2024-01-04 RX ADMIN — DULOXETINE HYDROCHLORIDE 30 MG: 30 CAPSULE, DELAYED RELEASE ORAL at 15:19

## 2024-01-04 RX ADMIN — INSULIN LISPRO 2 UNITS: 100 INJECTION, SOLUTION INTRAVENOUS; SUBCUTANEOUS at 12:20

## 2024-01-04 RX ADMIN — Medication 10 ML: at 08:19

## 2024-01-04 RX ADMIN — BUSPIRONE HYDROCHLORIDE 10 MG: 10 TABLET ORAL at 09:44

## 2024-01-04 RX ADMIN — DILTIAZEM HYDROCHLORIDE 180 MG: 180 CAPSULE, COATED, EXTENDED RELEASE ORAL at 15:19

## 2024-01-04 ASSESSMENT — PAIN - FUNCTIONAL ASSESSMENT: PAIN_FUNCTIONAL_ASSESSMENT: ACTIVITIES ARE NOT PREVENTED

## 2024-01-04 ASSESSMENT — PAIN SCALES - GENERAL
PAINLEVEL_OUTOF10: 6
PAINLEVEL_OUTOF10: 8

## 2024-01-04 ASSESSMENT — PAIN DESCRIPTION - LOCATION
LOCATION: BACK
LOCATION: BACK

## 2024-01-04 ASSESSMENT — PAIN DESCRIPTION - DESCRIPTORS
DESCRIPTORS: ACHING
DESCRIPTORS: ACHING

## 2024-01-04 ASSESSMENT — PAIN DESCRIPTION - ORIENTATION: ORIENTATION: LOWER

## 2024-01-04 ASSESSMENT — PAIN DESCRIPTION - PAIN TYPE: TYPE: CHRONIC PAIN

## 2024-01-04 ASSESSMENT — PAIN DESCRIPTION - ONSET: ONSET: ON-GOING

## 2024-01-04 ASSESSMENT — PAIN DESCRIPTION - FREQUENCY: FREQUENCY: CONTINUOUS

## 2024-01-04 NOTE — PROGRESS NOTES
Returned from Dialysis.  Patient oriented to room, assessment completed.  Patient states that he is not taking any of his home medications and he states that he hasn't taken any meds in 3 months because he has 40 bottles of pills and is unsure what to take and what regimen.  Provided dinner to patient, now on 5liters Nasal canula.

## 2024-01-04 NOTE — DIALYSIS
Treatment time: 3.5 hours  Net UF: 2700 ml     Pre weight: 89.8 kg  Post weight:87.1 kg  EDW: 94 kg, challenging     Access used: L TDC    Access function: well with  ml/min     Medications or blood products given: Heparin Dwells      Regular outpatient schedule: MWF     Summary of response to treatment: Patient tolerated treatment well and without any complications. Patient remained stable throughout entire treatment and upon exiting the dialysis suite via transport.     Report given to Maria Ines Lezama RN and copy of dialysis treatment record placed in chart, to be scanned into EMR.

## 2024-01-04 NOTE — PROGRESS NOTES
RT Inhaler-Nebulizer Bronchodilator Protocol Note    There is a bronchodilator order in the chart from a provider indicating to follow the RT Bronchodilator Protocol and there is an “Initiate RT Inhaler-Nebulizer Bronchodilator Protocol” order as well (see protocol at bottom of note).    CXR Findings:  XR CHEST PORTABLE    Result Date: 1/2/2024  New left perihilar infiltrate.  This is suspicious for pneumonia. Examination otherwise unchanged.       The findings from the last RT Protocol Assessment were as follows:   History Pulmonary Disease: (P) Chronic pulmonary disease  Respiratory Pattern: (P) Dyspnea on exertion or RR 21-25 bpm  Breath Sounds: (P) Slightly diminished and/or crackles  Cough: (P) Strong, spontaneous, non-productive  Indication for Bronchodilator Therapy: (P) Decreased or absent breath sounds  Bronchodilator Assessment Score: (P) 6    Aerosolized bronchodilator medication orders have been revised according to the RT Inhaler-Nebulizer Bronchodilator Protocol below.    Respiratory Therapist to perform RT Therapy Protocol Assessment initially then follow the protocol.  Repeat RT Therapy Protocol Assessment PRN for score 0-3 or on second treatment, BID, and PRN for scores above 3.    No Indications - adjust the frequency to every 6 hours PRN wheezing or bronchospasm, if no treatments needed after 48 hours then discontinue using Per Protocol order mode.     If indication present, adjust the RT bronchodilator orders based on the Bronchodilator Assessment Score as indicated below.  Use Inhaler orders unless patient has one or more of the following: on home nebulizer, not able to hold breath for 10 seconds, is not alert and oriented, cannot activate and use MDI correctly, or respiratory rate 25 breaths per minute or more, then use the equivalent nebulizer order(s) with same Frequency and PRN reasons based on the score.  If a patient is on this medication at home then do not decrease Frequency below that

## 2024-01-04 NOTE — PROGRESS NOTES
Bedside report and transfer of care given to NAVJOT Spann. Pt currently resting in bed with the call light within reach. Pt denies any other care needs at this time. Pt stable at this time.

## 2024-01-04 NOTE — PROGRESS NOTES
Pt blood sugar 496. Dr. Hwang made aware. He gave verbal orders to discontinue low dose sliding scale orders and order high dose sliding scale. Readback completed and orders placed in chart at this time.

## 2024-01-04 NOTE — CARE COORDINATION
Case Management Assessment  Initial Evaluation    Date/Time of Evaluation: 1/4/2024 9:10 AM  Assessment Completed by: Ashley He RN    If patient is discharged prior to next notation, then this note serves as note for discharge by case management.    Patient Name: Igor Ann                   YOB: 1970  Diagnosis: Dyspnea and respiratory abnormalities [R06.00, R06.89]  Fluid overload [E87.70]                   Date / Time: 1/2/2024  5:30 PM    Patient Admission Status: Inpatient   Readmission Risk (Low < 19, Mod (19-27), High > 27): Readmission Risk Score: 52.2    Current PCP: Jessenia Morrison APRN - CNP  PCP verified by CM? Yes (Jessenia Morrison)    Chart Reviewed: Yes      History Provided by: Patient, Spouse  Patient Orientation: Alert and Oriented    Patient Cognition: Alert    Hospitalization in the last 30 days (Readmission):  Yes    If yes, Readmission Assessment in CM Navigator will be completed.    Advance Directives:      Code Status: Full Code   Patient's Primary Decision Maker is: Legal Next of Kin    Primary Decision Maker: Crystal Ann HCP - Spouse - [phone]    Secondary Decision Maker: Sabrina Tripp - Brother/Sister - [phone]    Discharge Planning:    Patient lives with: Spouse/Significant Other Type of Home: Trailer/Mobile Home  Primary Care Giver: Self  Patient Support Systems include: Spouse/Significant Other   Current Financial resources: Medicaid, Medicare  Current community resources: None  Current services prior to admission: Other (Comment), Oxygen Therapy (HD [facility]ita, O2)            Current DME:              Type of Home Care services:  None    ADLS  Prior functional level: Independent in ADLs/IADLs  Current functional level: Independent in ADLs/IADLs    PT AM-PAC:   /24  OT AM-PAC:   /24    Family can provide assistance at DC:    Would you like Case Management to discuss the discharge plan with any other family members/significant others, and if so,

## 2024-01-04 NOTE — PROGRESS NOTES
Comprehensive Nutrition Assessment    Type and Reason for Visit:  Initial, Positive Nutrition Screen (+ screen for MST = 3)    Nutrition Recommendations/Plan:   Continue ADULT DIET; Regular; 4 carb choices per meal; Low Potassium; Low Phosphorus diet order + 1800 ml FR per day.   Added Nepro ONS with breakfast and dinner meals.   Monitor appetite, meal intake, acceptance/intake of ONS.   Recheck patient's weight since patient's weight was documented as 198# on 1/3/24 via standing scale and weight documented on 1/4/24 was 171# 6 oz.   Monitor nutrition-related labs, bowel function, and weight trends.      Malnutrition Assessment:  Malnutrition Status:  At risk for malnutrition (01/04/24 1603)    Context:  Acute Illness     Findings of the 6 clinical characteristics of malnutrition:  Energy Intake:  No significant decrease in energy intake  Weight Loss:  Unable to assess     Body Fat Loss:  Unable to assess     Muscle Mass Loss:  Unable to assess    Fluid Accumulation:  No significant fluid accumulation     Strength:  Not Performed    Nutrition Assessment:    patient is nutritionally compromised AEB respiratory dysfunction PTA, renal dysfunction and non-compliance with HD, and weight loss PTA, however, patient is improved from a nutritional standpoint AEB he is consuming > 50% of his meals + snacks during this admission; will continue ADULT DIET; Regular; 4 carb choices per meal; Low Potassium; Low Phosphorus diet order + 1800 ml FR per day and will add Nepro with breakfast and dinner meals    Nutrition Related Findings:    patient is A & O x 4; patient presented via EMS after EMS were called for respiratory dysfunction but he refused to go to the hospital and then patient made a statement of wanting to harm himself and EMS brought patient to the hospital, per protocol; patient is non-compliant with HD; patient's appetite and po intake have been adequate during admission; + HD on 1/3/24 with -2.7L fluid removal;  patient's HD schedule is MWF Wound Type: None       Current Nutrition Intake & Therapies:    Average Meal Intake: 51-75%, %  Average Supplements Intake: None Ordered  ADULT DIET; Regular; 4 carb choices (60 gm/meal); Low Potassium (Less than 3000 mg/day); Low Phosphorus (Less than 1000 mg); 1800 ml  ADULT ORAL NUTRITION SUPPLEMENT; Breakfast, Dinner; Renal Oral Supplement    Anthropometric Measures:  Height: 175.3 cm (5' 9.02\")  Ideal Body Weight (IBW): 160 lbs (73 kg)    Admission Body Weight: 89.8 kg (197 lb 15.6 oz) (obtained on 1/3/24; actual weight)  Current Body Weight: 89.8 kg (197 lb 15.6 oz) (obtained on 1/3/24; actual weight via standing scale), 123.7 % IBW. Weight Source: Standing Scale  Current BMI (kg/m2): 29.2  Usual Body Weight: 93.6 kg (206 lb 5.6 oz) (obtained on 12/8/23; actual weight)  % Weight Change (Calculated): -4.1  Weight Adjustment For: No Adjustment                 BMI Categories: Overweight (BMI 25.0-29.9)    Estimated Daily Nutrient Needs:  Energy Requirements Based On: Kcal/kg  Weight Used for Energy Requirements: Admission  Energy (kcal/day): 2065 - 2245 kcals based on 23-25 kcals/kg/CBW  Weight Used for Protein Requirements: Current  Protein (g/day): 108 - 126 g protein based on 1.2-1.4 g/kg/IBW  Method Used for Fluid Requirements: 1 ml/kcal  Fluid (ml/day): 2065 - 2245 ml    Nutrition Diagnosis:   Unintended weight loss related to impaired respiratory function, increase demand for energy/nutrients, renal dysfunction, catabolic illness as evidenced by weight loss, dialysis, lab values    Nutrition Interventions:   Food and/or Nutrient Delivery: Continue Current Diet, Start Oral Nutrition Supplement  Nutrition Education/Counseling: No recommendation at this time  Coordination of Nutrition Care: Continue to monitor while inpatient, Coordination of Care       Goals:     Goals: Meet at least 75% of estimated needs, by next RD assessment       Nutrition Monitoring and Evaluation:

## 2024-01-04 NOTE — PROGRESS NOTES
RT Inhaler-Nebulizer Bronchodilator Protocol Note    There is a bronchodilator order in the chart from a provider indicating to follow the RT Bronchodilator Protocol and there is an “Initiate RT Inhaler-Nebulizer Bronchodilator Protocol” order as well (see protocol at bottom of note).    CXR Findings:  XR CHEST PORTABLE    Result Date: 1/2/2024  New left perihilar infiltrate.  This is suspicious for pneumonia. Examination otherwise unchanged.       The findings from the last RT Protocol Assessment were as follows:   History Pulmonary Disease: Chronic pulmonary disease  Respiratory Pattern: Dyspnea on exertion or RR 21-25 bpm  Breath Sounds: Slightly diminished and/or crackles  Cough: Strong, spontaneous, non-productive  Indication for Bronchodilator Therapy: Decreased or absent breath sounds  Bronchodilator Assessment Score: 6    Aerosolized bronchodilator medication orders have been revised according to the RT Inhaler-Nebulizer Bronchodilator Protocol below.    Respiratory Therapist to perform RT Therapy Protocol Assessment initially then follow the protocol.  Repeat RT Therapy Protocol Assessment PRN for score 0-3 or on second treatment, BID, and PRN for scores above 3.    No Indications - adjust the frequency to every 6 hours PRN wheezing or bronchospasm, if no treatments needed after 48 hours then discontinue using Per Protocol order mode.     If indication present, adjust the RT bronchodilator orders based on the Bronchodilator Assessment Score as indicated below.  Use Inhaler orders unless patient has one or more of the following: on home nebulizer, not able to hold breath for 10 seconds, is not alert and oriented, cannot activate and use MDI correctly, or respiratory rate 25 breaths per minute or more, then use the equivalent nebulizer order(s) with same Frequency and PRN reasons based on the score.  If a patient is on this medication at home then do not decrease Frequency below that used at home.    0-3 -

## 2024-01-04 NOTE — PROGRESS NOTES
Pt awake in bed. Assessment completed and medications given per MAR. VS as charted in flowsheet. A&Ox4. Pt requesting something for anxiety PRN atarax given at this time.  Pt denies any further needs at this time. Call light in reach and bed in lowest position.

## 2024-01-04 NOTE — PROGRESS NOTES
Pt is requesting to be put back on BiPAP stating he needs more air. Perfect serve sent to Dr. Hwang due to him stating earlier to not to put patient back on BiPAP. Dr. HALL replied stated patient does not need BiPAP and to complete a walk test on to assess oxygen needs while ambulating. Pt states he is on 4L all the time at home. Pt currently on 4L 99% Pt got up to side of the bed stated he felt SOB already. Pt states he would try to walk. Pt stood up for about 10 seconds stated he was too SOB and could not do it. Pt states \"he cant send me home like this. I refused to go\". Pt stayed above 95% during this. Dr. HALL made aware.

## 2024-01-04 NOTE — PROGRESS NOTES
[facility] Progress Note           Subjective/   53 y.o. year old male who we are seeing in consultation for ESKD on HD.     HPI:  Last HD on 1/3 with 2.7 liters removed, post-weight of 87.1 kg.  States remains short of breath.    ROS:  +weak, intake adequate.    Objective/   GEN:  Chronically ill, BP (!) 169/57   Pulse (!) 115   Temp 98 °F (36.7 °C) (Oral)   Resp 20   Ht 1.753 m (5' 9\")   Wt 77.7 kg (171 lb 6.4 oz)   SpO2 94%   BMI 25.31 kg/m²   HEENT: non-icteric, no JVD  CV: S1, S2, tachycardic, without m/r/g; no LE edema  RESP: CTA B without w/r/r; breathing wnl  ABD: +bs, soft, nt, no hsm  SKIN: warm, no rashes  ACCESS: Left IJ TDC    Data/  Recent Labs     01/02/24  1750 01/03/24  0714   WBC 9.9 5.8   HGB 9.8* 8.5*   HCT 29.8* 26.5*   MCV 90.0 92.8    261     Recent Labs     01/02/24  1750 01/02/24  1922 01/03/24  0714   *  --  134*   K  --  4.4 4.7   CL 88*  --  92*   CO2 27  --  23   GLUCOSE 197*  --  134*   BUN 72*  --  81*   CREATININE 8.5*  --  9.5*   LABGLOM 7*  --  6*       Assessment/     - End stage kidney disease - on HD Mon-Wed-Fri     - Acute hypoxic, hypercarbic respiratory failure     - Anemia of chronic disease     - Hypertension    Plan/     - HD tomorrow per MWF schedule UF as tolerated, significantly under EDW of 94 kg  - Supplemental DAVON dosing with HD   - Trend labs, bp's, weights    ____________________________________  Ricky Carlos MD  [facility]  [email or web address]  Office: [phone]

## 2024-01-04 NOTE — PROGRESS NOTES
[facility]                                 Pulmonary/Critical care  Consult /Progress Note :                                                                  CC : Worsening shortness of breath      Subjective  Doing much better  SOB has improved  Use his CPAP  No chest pain   On 4 L        PHYSICAL EXAM:  Blood pressure (!) 163/99, pulse 95, temperature 98.3 °F (36.8 °C), temperature source Axillary, resp. rate 19, height 1.753 m (5' 9\"), weight 77.7 kg (171 lb 6.4 oz), SpO2 99 %.' on RA  Gen: No distress.   Eyes: PERRL. No sclera icterus. No conjunctival injection.   ENT: No discharge. Pharynx clear.   Neck: Trachea midline. No obvious mass.    Resp: + rhonchi. No dullness on percussion.  CV: Regular rate. Regular rhythm. No murmur or rub. No edema. Peripheral pulses are 2+.  Capillary refill is less than 3 seconds.  GI: Non-tender. Non-distended. No hernia.   Skin: Warm and dry. No nodule on exposed extremities.   Lymph: No cervical LAD. No supraclavicular LAD.   M/S: No cyanosis. No joint deformity. No clubbing.   Neuro: Awake. Alert. Moves all four extremities.   Psych: Oriented x 3. No anxiety.     LABS:  CBC:   Recent Labs     01/02/24 1750 01/03/24  0714   WBC 9.9 5.8   HGB 9.8* 8.5*   HCT 29.8* 26.5*   MCV 90.0 92.8    261       BMP:   Recent Labs     01/02/24 1750 01/02/24 1922 01/03/24  0714   *  --  134*   K  --  4.4 4.7   CL 88*  --  92*   CO2 27  --  23   BUN 72*  --  81*   CREATININE 8.5*  --  9.5*       LIVER PROFILE:   Recent Labs     01/02/24 1750 01/02/24 1922   AST  --  11*   ALT  --  11   BILITOT 0.3  --    ALKPHOS  --  137*       PT/INR:   Recent Labs     01/02/24 1750   PROTIME 13.7   INR 1.05       APTT: No results for input(s): \"APTT\" in the last 72 hours.  UA:No results for input(s): \"NITRITE\", \"COLORU\", \"PHUR\", \"LABCAST\", \"WBCUA\", \"RBCUA\", \"MUCUS\", \"TRICHOMONAS\", \"YEAST\",

## 2024-01-04 NOTE — PROGRESS NOTES
IM Progress Note    Admit Date:  1/2/2024  1    Interval history:  ESRD with fluid overload  Anxiety    Subjective:  Mr. Ann remains anxious and constantly asking for cpap  Although no hypoxia noted  Had HD with removal of fluid         Objective:   BP (!) 163/99   Pulse 95   Temp 98.3 °F (36.8 °C) (Axillary)   Resp 19   Ht 1.753 m (5' 9\")   Wt 77.7 kg (171 lb 6.4 oz)   SpO2 98%   BMI 25.31 kg/m²     Intake/Output Summary (Last 24 hours) at 1/4/2024 0749  Last data filed at 1/4/2024 0319  Gross per 24 hour   Intake 744 ml   Output 100 ml   Net 644 ml       Physical Exam:         General:  middle aged male, on cpap Awake, alert and oriented. Appears to be not in any distress  Mucous Membranes:  Pink , anicteric  Neck: No JVD, no carotid bruit, no thyromegaly  Chest:  Clear to auscultation bilaterally, minimal basilar crackles  Cardiovascular:  RRR S1S2 heard, no murmurs or gallops  Abdomen:  Soft, undistended, non tender, no organomegaly, BS present  Extremities: No edema or cyanosis. Distal pulses well felt  Left sided chest tunneled HD catheter  Neurological : grossly normal except for anxiety     Medications:   Scheduled Medications:    sodium chloride flush  5-40 mL IntraVENous 2 times per day    insulin lispro  0-4 Units SubCUTAneous TID WC    insulin lispro  0-4 Units SubCUTAneous Nightly    epoetin siddharth-epbx  10,000 Units IntraVENous Once per day on Mon Wed Fri    ipratropium 0.5 mg-albuterol 2.5 mg  2 Dose Inhalation Q4H WA RT    predniSONE  40 mg Oral Daily     I   sodium chloride       sodium chloride flush, sodium chloride, ondansetron **OR** ondansetron, polyethylene glycol, acetaminophen **OR** acetaminophen, ALPRAZolam, hydrOXYzine HCl, heparin (porcine)    Lab Data:  Recent Labs     01/02/24  1750 01/03/24  0714   WBC 9.9 5.8   HGB 9.8* 8.5*   HCT 29.8* 26.5*   MCV 90.0 92.8    261     Recent Labs     01/02/24  1750 01/02/24  1922 01/03/24  0714   *  --  134*   K  --  4.4 4.7  12-9-2022 in left ventricular function. Mild mitral and tricuspid regurgitation. Right ventricular systolic function is mildly reduced . Systolic pulmonic artery pressure (SPAP) is estimated at 40 mmHg consistent with mild pulmonary hypertension (Right atrial pressure of 3 mmHg).      ASSESSMENT/PLAN:     #Acute on chronic hypoxic respiratory failure   #possible Fluid overload   -wears 4 L O2 baseline and CPAP nightly  - his HD schedule is changed for holidays and now coming with fluid overload  Imagine with no fluid overload but pt was hypoxic  Doubt pna   -requiring CPAP at patient request, he does not want to wean off   -VBG with no hypercarbia     -pulmonology and nephrology consulted   -given IV lasix, resumed home torsemide  -HD done per schedule and fluid removed , although no clear hypoxia or distress, pt continues to seek for cpap   - I have taken off cpap and placed on home o2   - recurrent ER visits and multiple admissions for anxiety noted  - add buspar, seroquel   - avoid continuous cpap   - dc planning     #ESRD on HD  -HD MWF  - imaging with no fluid overload, electrolytes stable   -renal diet   -nephrology consulted     #Chronic systolic HF   -EF 25-30%  -last echo as above   -resumed home regimen toprol XL and torsemide   -cannot tolerate ACEI/ARB, aldactone 2/2 to ESRD        #Elevated troponin   -292--> 285  -no CP   -EKG without acute ischemic changes   -likely 2/2 to renal function      #Chronic normocytic anemia   -denies any s/s of bleeding  -likely 2/2 to renal disease   -monitor CBC      #COPD without AE  -continue prn inhalers      #CAD  -on plavix, statin, BB, imdur      #DM type 2   -SSI   -monitor BG      #Hx of VTE   -on eliquis      #HX of CVA   -plavix, statin      #Depression   -continue home regimen      #ZAINAB   -uses CPAP nightly     # Anxiety - severe and recurrent ER visits likely with this  Adding new meds as above       DVT Prophylaxis: Eliquis   Diet: ADULT DIET; Regular; 4

## 2024-01-04 NOTE — CONSULTS
[facility]   HEART FAILURE PROGRAM      NAME:  Igor Ann  AGE: 53 y.o.   GENDER: male  : 1970  TODAY'S DATE:  2024    Subjective:     VISIT TYPE: Evaluation / Consult    ADMIT DATE: 2024    PAST MEDICAL HISTORY:      Diagnosis Date    Ambulatory dysfunction     walker for long distances, SOB with distance    Aortic stenosis     echo 2017    Arthritis     hands and hips    Asthma     Bilateral hilar adenopathy syndrome 2013    CAD (coronary artery disease)     Dr. Javier Chanel [facility]    Cardiomyopathy ([facility]) 2019    EF= 43%    CHF (congestive heart failure) ([facility])     Chronic pain     COPD (chronic obstructive pulmonary disease) ([facility])     pulmonology Dr. Batista    CVA (cerebral vascular accident) ([facility]) 2017    Depression     Diabetes mellitus ([facility])     borderline    Difficult intravenous access     Emphysema of lung ([facility])     ESRD (end stage renal disease) on dialysis ([facility])     MWF    Fear of needles     Gastric ulcer     GERD (gastroesophageal reflux disease)     Heart valve problem     bicuspic valve    Hemodialysis patient ([facility])     History of spinal fracture     work incident    Hx of blood clots     Bilateral lower extremities; stents in place    Hyperlipidemia     Hypertension     MI (myocardial infarction) ([facility]) 2019    has had 9 MIs. 2019 was the last    Neuromuscular disorder ([facility])     due to CVA    Numbness and tingling in left arm     from fistula    Pneumonia     PONV (postoperative nausea and vomiting)     Prolonged emergence from general anesthesia     States requires more medication than most people    Sleep apnea     Uses CPAP    Stroke ([facility])     7mm thalamic cva 2017 deficts left side, left side weakness    TIA (transient ischemic attack)     Unspecified diseases of blood and blood-forming organs      HOME MEDICATIONS:  Prior to Admission medications    Medication Sig Start Date End Date Taking? Authorizing Provider   dilTIAZem HCl   gm/meal); Low Potassium (Less than 3000 mg/day); Low Phosphorus (Less than 1000 mg); 1800 ml    Education:     EDUCATION STATUS: Patient Igor  [x]  Provided both written and verbal education on Heart Failure signs & symptoms  [x]  Received verbal acknowledgment/understanding of Heart Failure related causes  [x]  Provided instructions on daily medications  [x]  Provided instructions to monitor and record weight daily  [x]  Provided instructions to call if weight increases 3 lbs in one day or 5 lbs in one week  [x]  Provided instructions on how to maintain a low sodium diet  [x]  Provided 32 oz labeled water pitcher to monitor intake of fluids  [x]  Provided recommendations on activity and exercise    []  Provided recommendations for smoking cessation programs      EDUCATIONAL MATERIALS PROVIDED:    [x]  [employer]: A Patient Education Guide Living with Heart Failure Booklet  [x]  Follow-up Appointment Reminder  [x]  Heart Failure Zones Self-Check Plan  [x]  Weight and Heart Failure Zone Log  [x]  AHA: HF and Your Ejection Fraction Explained  [x]  Sleep Disorders and Your Heart  [x]  AHA: HF [employer] Aids Patients at Home  [x]  [employer]: Signs of Heart Failure    PATIENT/CAREGIVER TEACHING:   Level of patient/caregiver understanding able to:   [x] Verbalize understanding   [] Demonstrate understanding       [] Teach back        [] Needs reinforcement     []  Other:      TEACHING TIME:  60 minutes       Recommendations:     [x]  Encourage to call Heart Failure Resource Line [phone] with any questions or concerns.  [x]  Encourage follow-up appointment compliance. Next Appointment: 1/11/24 at 1300 with NEW pcp provider  [x]  Emphasize daily weights: Instruct patient to call the MD if weight gain of 3 lbs in 1 day or 5 lbs in a week.   [x]  Review sodium restriction diet. Encourage patient to not add table salt and avoid foods high in sodium.   [x]  Educate further on fluid restriction of 48 oz - 64 oz with

## 2024-01-04 NOTE — PLAN OF CARE
Problem: Respiratory - Adult  Goal: Achieves optimal ventilation and oxygenation  Outcome: Progressing  Flowsheets (Taken 1/3/2024 2030 by Maria Ines Lezama RN)  Achieves optimal ventilation and oxygenation:   Assess for changes in respiratory status   Assess for changes in mentation and behavior     Problem: Cardiovascular - Adult  Goal: Maintains optimal cardiac output and hemodynamic stability  Outcome: Progressing  Goal: Absence of cardiac dysrhythmias or at baseline  Outcome: Progressing  Flowsheets (Taken 1/3/2024 2030 by Maria Ines Lezama RN)  Absence of cardiac dysrhythmias or at baseline: Monitor cardiac rate and rhythm     Problem: Discharge Planning  Goal: Discharge to home or other facility with appropriate resources  Outcome: Progressing     Problem: Chronic Conditions and Co-morbidities  Goal: Patient's chronic conditions and co-morbidity symptoms are monitored and maintained or improved  Outcome: Progressing     Problem: Skin/Tissue Integrity  Goal: Absence of new skin breakdown  Description: 1.  Monitor for areas of redness and/or skin breakdown  2.  Assess vascular access sites hourly  3.  Every 4-6 hours minimum:  Change oxygen saturation probe site  4.  Every 4-6 hours:  If on nasal continuous positive airway pressure, respiratory therapy assess nares and determine need for appliance change or resting period.  Outcome: Progressing     Problem: Safety - Adult  Goal: Free from fall injury  Outcome: Progressing

## 2024-01-05 LAB
ALBUMIN SERPL-MCNC: 3.5 G/DL (ref 3.4–5)
ANION GAP SERPL CALCULATED.3IONS-SCNC: 17 MMOL/L (ref 3–16)
BUN SERPL-MCNC: 59 MG/DL (ref 7–20)
CALCIUM SERPL-MCNC: 8.7 MG/DL (ref 8.3–10.6)
CHLORIDE SERPL-SCNC: 95 MMOL/L (ref 99–110)
CO2 SERPL-SCNC: 23 MMOL/L (ref 21–32)
CREAT SERPL-MCNC: 7.6 MG/DL (ref 0.9–1.3)
DEPRECATED RDW RBC AUTO: 17.9 % (ref 12.4–15.4)
GFR SERPLBLD CREATININE-BSD FMLA CKD-EPI: 8 ML/MIN/{1.73_M2}
GLUCOSE BLD-MCNC: 136 MG/DL (ref 70–99)
GLUCOSE BLD-MCNC: 196 MG/DL (ref 70–99)
GLUCOSE BLD-MCNC: 291 MG/DL (ref 70–99)
GLUCOSE BLD-MCNC: 304 MG/DL (ref 70–99)
GLUCOSE SERPL-MCNC: 171 MG/DL (ref 70–99)
HCT VFR BLD AUTO: 23.3 % (ref 40.5–52.5)
HGB BLD-MCNC: 7.7 G/DL (ref 13.5–17.5)
MCH RBC QN AUTO: 30.5 PG (ref 26–34)
MCHC RBC AUTO-ENTMCNC: 33.1 G/DL (ref 31–36)
MCV RBC AUTO: 92.1 FL (ref 80–100)
PERFORMED ON: ABNORMAL
PHOSPHATE SERPL-MCNC: 4.4 MG/DL (ref 2.5–4.9)
PLATELET # BLD AUTO: 247 K/UL (ref 135–450)
PMV BLD AUTO: 7.9 FL (ref 5–10.5)
POTASSIUM SERPL-SCNC: 4.4 MMOL/L (ref 3.5–5.1)
POTASSIUM SERPL-SCNC: 4.4 MMOL/L (ref 3.5–5.1)
RBC # BLD AUTO: 2.53 M/UL (ref 4.2–5.9)
SODIUM SERPL-SCNC: 135 MMOL/L (ref 136–145)
WBC # BLD AUTO: 7.7 K/UL (ref 4–11)

## 2024-01-05 PROCEDURE — 90935 HEMODIALYSIS ONE EVALUATION: CPT

## 2024-01-05 PROCEDURE — 97161 PT EVAL LOW COMPLEX 20 MIN: CPT

## 2024-01-05 PROCEDURE — 85027 COMPLETE CBC AUTOMATED: CPT

## 2024-01-05 PROCEDURE — 94640 AIRWAY INHALATION TREATMENT: CPT

## 2024-01-05 PROCEDURE — 6370000000 HC RX 637 (ALT 250 FOR IP): Performed by: NURSE PRACTITIONER

## 2024-01-05 PROCEDURE — 6360000002 HC RX W HCPCS: Performed by: INTERNAL MEDICINE

## 2024-01-05 PROCEDURE — 2060000000 HC ICU INTERMEDIATE R&B

## 2024-01-05 PROCEDURE — 97530 THERAPEUTIC ACTIVITIES: CPT

## 2024-01-05 PROCEDURE — 99233 SBSQ HOSP IP/OBS HIGH 50: CPT | Performed by: INTERNAL MEDICINE

## 2024-01-05 PROCEDURE — 6370000000 HC RX 637 (ALT 250 FOR IP): Performed by: INTERNAL MEDICINE

## 2024-01-05 PROCEDURE — 80069 RENAL FUNCTION PANEL: CPT

## 2024-01-05 PROCEDURE — 94660 CPAP INITIATION&MGMT: CPT

## 2024-01-05 PROCEDURE — 5A1D70Z PERFORMANCE OF URINARY FILTRATION, INTERMITTENT, LESS THAN 6 HOURS PER DAY: ICD-10-PCS | Performed by: INTERNAL MEDICINE

## 2024-01-05 PROCEDURE — 2700000000 HC OXYGEN THERAPY PER DAY

## 2024-01-05 PROCEDURE — 36415 COLL VENOUS BLD VENIPUNCTURE: CPT

## 2024-01-05 PROCEDURE — 99232 SBSQ HOSP IP/OBS MODERATE 35: CPT | Performed by: INTERNAL MEDICINE

## 2024-01-05 PROCEDURE — 2580000003 HC RX 258: Performed by: INTERNAL MEDICINE

## 2024-01-05 PROCEDURE — 94761 N-INVAS EAR/PLS OXIMETRY MLT: CPT

## 2024-01-05 RX ORDER — BUSPIRONE HYDROCHLORIDE 10 MG/1
10 TABLET ORAL 3 TIMES DAILY
Qty: 60 TABLET | Refills: 0 | Status: SHIPPED | OUTPATIENT
Start: 2024-01-05 | End: 2024-01-06

## 2024-01-05 RX ORDER — PREDNISONE 20 MG/1
TABLET ORAL
Qty: 18 TABLET | Refills: 0 | Status: SHIPPED | OUTPATIENT
Start: 2024-01-05 | End: 2024-01-06

## 2024-01-05 RX ADMIN — APIXABAN 5 MG: 5 TABLET, FILM COATED ORAL at 20:01

## 2024-01-05 RX ADMIN — METOPROLOL SUCCINATE 50 MG: 50 TABLET, EXTENDED RELEASE ORAL at 20:01

## 2024-01-05 RX ADMIN — BUSPIRONE HYDROCHLORIDE 10 MG: 10 TABLET ORAL at 20:00

## 2024-01-05 RX ADMIN — PREDNISONE 40 MG: 20 TABLET ORAL at 12:35

## 2024-01-05 RX ADMIN — ISOSORBIDE MONONITRATE 30 MG: 30 TABLET, EXTENDED RELEASE ORAL at 12:36

## 2024-01-05 RX ADMIN — QUETIAPINE FUMARATE 12.5 MG: 25 TABLET ORAL at 20:01

## 2024-01-05 RX ADMIN — PRAVASTATIN SODIUM 20 MG: 10 TABLET ORAL at 20:01

## 2024-01-05 RX ADMIN — INSULIN LISPRO 12 UNITS: 100 INJECTION, SOLUTION INTRAVENOUS; SUBCUTANEOUS at 17:06

## 2024-01-05 RX ADMIN — OXYCODONE AND ACETAMINOPHEN 1 TABLET: 5; 325 TABLET ORAL at 00:09

## 2024-01-05 RX ADMIN — IPRATROPIUM BROMIDE AND ALBUTEROL SULFATE 1 DOSE: 2.5; .5 SOLUTION RESPIRATORY (INHALATION) at 20:54

## 2024-01-05 RX ADMIN — DILTIAZEM HYDROCHLORIDE 180 MG: 180 CAPSULE, COATED, EXTENDED RELEASE ORAL at 12:36

## 2024-01-05 RX ADMIN — QUETIAPINE FUMARATE 12.5 MG: 25 TABLET ORAL at 12:35

## 2024-01-05 RX ADMIN — EPOETIN ALFA-EPBX 10000 UNITS: 10000 INJECTION, SOLUTION INTRAVENOUS; SUBCUTANEOUS at 10:30

## 2024-01-05 RX ADMIN — DULOXETINE HYDROCHLORIDE 30 MG: 30 CAPSULE, DELAYED RELEASE ORAL at 12:36

## 2024-01-05 RX ADMIN — TRAZODONE HYDROCHLORIDE 50 MG: 50 TABLET ORAL at 20:01

## 2024-01-05 RX ADMIN — BUSPIRONE HYDROCHLORIDE 10 MG: 10 TABLET ORAL at 12:35

## 2024-01-05 RX ADMIN — EPOETIN ALFA-EPBX 10000 UNITS: 10000 INJECTION, SOLUTION INTRAVENOUS; SUBCUTANEOUS at 10:20

## 2024-01-05 RX ADMIN — Medication 10 ML: at 20:03

## 2024-01-05 RX ADMIN — APIXABAN 5 MG: 5 TABLET, FILM COATED ORAL at 12:35

## 2024-01-05 RX ADMIN — IPRATROPIUM BROMIDE AND ALBUTEROL SULFATE 1 DOSE: 2.5; .5 SOLUTION RESPIRATORY (INHALATION) at 15:40

## 2024-01-05 ASSESSMENT — PAIN DESCRIPTION - DESCRIPTORS: DESCRIPTORS: ACHING

## 2024-01-05 ASSESSMENT — PAIN DESCRIPTION - ORIENTATION: ORIENTATION: LOWER

## 2024-01-05 ASSESSMENT — PAIN DESCRIPTION - LOCATION: LOCATION: BACK

## 2024-01-05 ASSESSMENT — PAIN DESCRIPTION - PAIN TYPE: TYPE: CHRONIC PAIN

## 2024-01-05 ASSESSMENT — PAIN SCALES - GENERAL: PAINLEVEL_OUTOF10: 8

## 2024-01-05 ASSESSMENT — PAIN DESCRIPTION - ONSET: ONSET: ON-GOING

## 2024-01-05 ASSESSMENT — PAIN - FUNCTIONAL ASSESSMENT: PAIN_FUNCTIONAL_ASSESSMENT: ACTIVITIES ARE NOT PREVENTED

## 2024-01-05 ASSESSMENT — PAIN DESCRIPTION - FREQUENCY: FREQUENCY: CONTINUOUS

## 2024-01-05 NOTE — PROGRESS NOTES
[facility] Progress Note           Subjective/   53 y.o. year old male who we are seeing in consultation for ESKD on HD.     HPI:  Last HD on 1/5 with 2 liters removed, post-weight of 91.1 kg, UF limited due to cramping.  States remains short of breath.    ROS:  +weak, intake adequate.    Objective/   GEN:  Chronically ill, /68   Pulse 72   Temp 97.9 °F (36.6 °C)   Resp 18   Ht 1.753 m (5' 9.02\")   Wt 91.1 kg (200 lb 13.4 oz)   SpO2 100%   BMI 29.64 kg/m²   HEENT: non-icteric, no JVD  CV: S1, S2, tachycardic, without m/r/g; no LE edema  RESP: CTA B without w/r/r; breathing wnl  ABD: +bs, soft, nt, no hsm  SKIN: warm, no rashes  ACCESS: Left IJ TDC    Data/  Recent Labs     01/02/24 1750 01/03/24  0714 01/05/24  0533   WBC 9.9 5.8 7.7   HGB 9.8* 8.5* 7.7*   HCT 29.8* 26.5* 23.3*   MCV 90.0 92.8 92.1    261 247       Recent Labs     01/02/24 1750 01/02/24 1922 01/03/24  0714 01/05/24  0533   *  --  134* 135*   K  --    < > 4.7 4.4  4.4   CL 88*  --  92* 95*   CO2 27  --  23 23   GLUCOSE 197*  --  134* 171*   PHOS  --   --   --  4.4   BUN 72*  --  81* 59*   CREATININE 8.5*  --  9.5* 7.6*   LABGLOM 7*  --  6* 8*    < > = values in this interval not displayed.         Assessment/     - End stage kidney disease - on HD Mon-Wed-Fri     - Acute hypoxic, hypercarbic respiratory failure     - Anemia of chronic disease     - Hypertension    Plan/     - HD per MW schedule UF as tolerated, under EDW of 94 kg, now ~91 kg  - Supplemental DAVON dosing with HD   - Trend labs, bp's, weights    Okay for discharge planning    ____________________________________  Ricky Carlos MD  [facility]  [email or web address]  Office: [phone]

## 2024-01-05 NOTE — PLAN OF CARE
HEART FAILURE CARE PLAN:    Comorbidities Reviewed: Yes   Patient has a past medical history of Ambulatory dysfunction, Aortic stenosis, Arthritis, Asthma, Bilateral hilar adenopathy syndrome, CAD (coronary artery disease), Cardiomyopathy ([facility]), CHF (congestive heart failure) ([facility]), Chronic pain, COPD (chronic obstructive pulmonary disease) ([facility]), CVA (cerebral vascular accident) ([facility]), Depression, Diabetes mellitus ([facility]), Difficult intravenous access, Emphysema of lung ([facility]), ESRD (end stage renal disease) on dialysis ([facility]), Fear of needles, Gastric ulcer, GERD (gastroesophageal reflux disease), Heart valve problem, Hemodialysis patient ([facility]), History of spinal fracture, Hx of blood clots, Hyperlipidemia, Hypertension, MI (myocardial infarction) ([facility]), Neuromuscular disorder ([facility]), Numbness and tingling in left arm, Pneumonia, PONV (postoperative nausea and vomiting), Prolonged emergence from general anesthesia, Sleep apnea, Stroke ([facility]), TIA (transient ischemic attack), and Unspecified diseases of blood and blood-forming organs.     ECHOCARDIOGRAM Reviewed: Yes   Patient's Ejection Fraction (EF) is less than 40%    Weights Reviewed: Yes   Admission weight: 95.3 kg (210 lb)   Wt Readings from Last 3 Encounters:   01/04/24 77.7 kg (171 lb 6.4 oz)   12/29/23 95.3 kg (210 lb)   12/24/23 95.7 kg (211 lb)     Intake & Output Reviewed: Yes     Intake/Output Summary (Last 24 hours) at 1/4/2024 7007  Last data filed at 1/4/2024 2142  Gross per 24 hour   Intake 1540 ml   Output 525 ml   Net 1015 ml     Medications Reviewed: Yes   SCHEDULED HOSPITAL MEDICATIONS:   busPIRone  10 mg Oral TID    QUEtiapine  12.5 mg Oral BID    ipratropium 0.5 mg-albuterol 2.5 mg  1 Dose Inhalation 4x Daily RT    dilTIAZem  180 mg Oral Daily    DULoxetine  30 mg Oral Daily    apixaban  5 mg Oral BID    pravastatin  20 mg Oral Nightly    traZODone  50 mg Oral Nightly    metoprolol succinate  50 mg Oral Nightly    isosorbide mononitrate  30 mg  Oral Daily    insulin lispro  0-16 Units SubCUTAneous TID WC    insulin lispro  0-4 Units SubCUTAneous Nightly    oxyCODONE-acetaminophen  1 tablet Oral Once    sodium chloride flush  5-40 mL IntraVENous 2 times per day    epoetin siddharth-epbx  10,000 Units IntraVENous Once per day on Mon Wed Fri    predniSONE  40 mg Oral Daily     ACE/ARB/ARNI is REQUIRED for EF </= 40% SYSTOLIC FAILURE:   ACE:: N/A  ARB:: None  ARNI:: None    Evidenced-Based Beta Blocker is REQUIRED for EF </= 40% SYSTOLIC FAILURE:   :: Metoprolol SUCCinate- Toprol XL    Diuretics:  ::   , Furosemide, Torsemide, Spironolactone, Metalozone, None, and N/A    Diet Reviewed: Yes   ADULT DIET; Regular; 4 carb choices (60 gm/meal); Low Potassium (Less than 3000 mg/day); Low Phosphorus (Less than 1000 mg); 1800 ml  ADULT ORAL NUTRITION SUPPLEMENT; Breakfast, Dinner; Renal Oral Supplement    Goal of Care Reviewed: Yes   Patient and/or Family's stated Goal of Care this Admission: reduce shortness of breath, increase activity tolerance, better understand heart failure and disease management, be more comfortable, and reduce lower extremity edema prior to discharge.

## 2024-01-05 NOTE — PROGRESS NOTES
Inpatient Physical Therapy Evaluation & Treatment    Unit: PCU  Date:  1/5/2024  Patient Name:    Igor Ann  Admitting diagnosis:  Dyspnea and respiratory abnormalities [R06.00, R06.89]  Fluid overload [E87.70]  Admit Date:  1/2/2024  Precautions/Restrictions/WB Status/ Lines/ Wounds/ Oxygen: Fall risk, Bed/chair alarm, and Lines (Supplemental O2 (4L))      Treatment Time:  1520 - 0553  Treatment Number:  1   Timed Code Treatment Minutes: 23 minutes  Total Treatment Minutes:  33  minutes    Patient Stated Goals for Therapy: \" to get out of pain \"          Discharge Recommendations: SNF if patient is unable to progress mobility.  DME needs for discharge: Defer to facility       Therapy recommendation for EMS Transport: can transport by wheelchair    Therapy recommendations for staff:   Assist of 1 for stand-pivot transfers with rolling walker (RW) and gait belt to/from BSC  to/from chair    History of Present Illness:   Per Dr. Hwang:  \"The patient is a 53 y.o. male with pmhx of COPD, CHF, chronic hypoxic respiratory failure, CAD, DM who presented to [facility] ED with complaint of shortness of breath and fluid overload . Pt has significant hx of coming off early during HD and multiple admissions for fluid overload . He had 7 visits to ER last month for similar complaints  He reports he his HD schedule was changed for holidays and started with sudden increasing sob and dry cough yesterday evening along with increasing anxiety. No fever or chills or chest pain . No changes in his home meds.   He repeatedly asked for bipap in ER and was also hypoxic. Currently comfortable on bipap and seen sitting up in bed in no distress.\"    Pt is very apprehensive to physical therapy stating that he does not want to leave the bedside because last time he fell in the hospital it took 5 nurses to get him up from the floor. Pt states that his L leg is in so much pain he feels like it is going to give out again.    Home

## 2024-01-05 NOTE — PROGRESS NOTES
IM Progress Note    Admit Date:  1/2/2024  2    Interval history:  ESRD with fluid overload  Anxiety    Subjective:  Mr. Ann had HD today and 2 L removed  Lower than dry weight now    Reports his breathing is fine on bipap and using it 24/7 and gets scared and anxious when off   Although no hypoxia noted        Objective:   BP (!) 106/57   Pulse 71   Temp 97.7 °F (36.5 °C) (Axillary)   Resp 20   Ht 1.753 m (5' 9.02\")   Wt 93.1 kg (205 lb 4.8 oz)   SpO2 100%   BMI 30.30 kg/m²     Intake/Output Summary (Last 24 hours) at 1/5/2024 0723  Last data filed at 1/4/2024 2142  Gross per 24 hour   Intake 1318 ml   Output 425 ml   Net 893 ml         Physical Exam:         General:  middle aged male, on cpap Awake, alert and oriented. Appears to be not in any distress  Mucous Membranes:  Pink , anicteric  Neck: No JVD, no carotid bruit, no thyromegaly  Chest:  Clear to auscultation bilaterally, resolved basilar crackles  Cardiovascular:  RRR S1S2 heard, no murmurs or gallops  Abdomen:  Soft, undistended, non tender, no organomegaly, BS present  Extremities: No edema or cyanosis. Distal pulses well felt  Left sided chest tunneled HD catheter  Neurological : grossly normal except for anxiety     Medications:   Scheduled Medications:    busPIRone  10 mg Oral TID    QUEtiapine  12.5 mg Oral BID    ipratropium 0.5 mg-albuterol 2.5 mg  1 Dose Inhalation 4x Daily RT    dilTIAZem  180 mg Oral Daily    DULoxetine  30 mg Oral Daily    apixaban  5 mg Oral BID    pravastatin  20 mg Oral Nightly    traZODone  50 mg Oral Nightly    metoprolol succinate  50 mg Oral Nightly    isosorbide mononitrate  30 mg Oral Daily    insulin lispro  0-16 Units SubCUTAneous TID WC    insulin lispro  0-4 Units SubCUTAneous Nightly    sodium chloride flush  5-40 mL IntraVENous 2 times per day    epoetin siddharth-epbx  10,000 Units IntraVENous Once per day on Mon Wed Fri    predniSONE  40 mg Oral Daily     I   dextrose      sodium chloride

## 2024-01-05 NOTE — DIALYSIS
Treatment time: 3.5 hours  Net UF:  2000  ml     Pre weight: 93.1 kg  Post weight:  91.1   kg  EDW: 94 kg, challenging      Access used: L TDC    Access function: well with  ml/min     Medications or blood products given: Heparin Dwells      Regular outpatient schedule: BETTY Lucas     Summary of response to treatment: Patient tolerated treatment well. Goal decreased to 2L after pt complained of chest cramping, resolved with lower UFR and decreasing BFR.  Crit Line:  HCT #1 25.9 minus HCt #2 25.6 EQUALS 0.2  showing NO refill present, ending profile A.     Report given to  Rishi East RN and copy of dialysis treatment record placed in chart, to be scanned into EMR.

## 2024-01-05 NOTE — PROGRESS NOTES
01/05/24 0331   NIV Type   NIV Started/Stopped On   Equipment Type v60   Mode Bilevel   Mask Type Full face mask   Mask Size Medium   Settings/Measurements   IPAP 18 cmH20   CPAP/EPAP 8 cmH2O   Vt (Measured) 613 mL   Rate Ordered 16   FiO2  30 %   I Time/ I Time % 0.9 s   Minute Volume (L/min) 11 Liters   Mask Leak (lpm) 13 lpm   Patient's Home Machine No

## 2024-01-05 NOTE — PROGRESS NOTES
4 Eyes Skin Assessment     NAME:  Igor Ann  YOB: 1970  MEDICAL RECORD NUMBER:  [id number]    The patient is being assessed for  Shift assessment > due to no 4 eyes on admission.     I agree that at least one RN has performed a thorough Head to Toe Skin Assessment on the patient. ALL assessment sites listed below have been assessed.      Areas assessed by both nurses:    Head, Face, Ears, Shoulders, Back, Chest, Arms, Elbows, Hands, Sacrum. Buttock, Coccyx, Ischium, Legs. Feet and Heels, and Under Medical Devices   Diabetic wound to top of R foot, Old healing diabetic wounds to side of R foot, amputated pinky toe R foot, L foot second toe old healing diabetic wound                     Does the Patient have a Wound? Yes wound(s) were present on assessment. LDA wound assessment was Initiated and completed by NAVJOT Gray Prevention initiated by RN: No  Wound Care Orders initiated by RN: Yes    Pressure Injury (Stage 3,4, Unstageable, DTI, NWPT, and Complex wounds) if present, place Wound referral order by RN under : No    New Ostomies, if present place, Ostomy referral order under : No     Nurse 1 eSignature: Electronically signed by Blank Ramos RN on 1/5/24 at 12:26 AM EST    **SHARE this note so that the co-signing nurse can place an eSignature**    Nurse 2 eSignature: Electronically signed by Lara Crump RN on 1/5/24 at 12:29 AM EST

## 2024-01-05 NOTE — PLAN OF CARE
Problem: Discharge Planning  Goal: Discharge to home or other facility with appropriate resources  Outcome: Not Progressing  Flowsheets (Taken 1/5/2024 1854)  Discharge to home or other facility with appropriate resources:   Identify barriers to discharge with patient and caregiver   Arrange for needed discharge resources and transportation as appropriate     Problem: Chronic Conditions and Co-morbidities  Goal: Patient's chronic conditions and co-morbidity symptoms are monitored and maintained or improved  Outcome: Not Progressing  Flowsheets (Taken 1/5/2024 1854)  Care Plan - Patient's Chronic Conditions and Co-Morbidity Symptoms are Monitored and Maintained or Improved:   Monitor and assess patient's chronic conditions and comorbid symptoms for stability, deterioration, or improvement   Collaborate with multidisciplinary team to address chronic and comorbid conditions and prevent exacerbation or deterioration   Update acute care plan with appropriate goals if chronic or comorbid symptoms are exacerbated and prevent overall improvement and discharge   Shailesh East RN  1/5/2024

## 2024-01-05 NOTE — PROGRESS NOTES
[facility]                                 Pulmonary/Critical care  Consult /Progress Note :                                                                  CC : Worsening shortness of breath      Subjective  Doing much better  SOB has improved  Use his CPAP  No chest pain   On 4 L   HR better controlled     PHYSICAL EXAM:  Blood pressure (!) 106/57, pulse 71, temperature 97.7 °F (36.5 °C), temperature source Axillary, resp. rate 20, height 1.753 m (5' 9.02\"), weight 93.1 kg (205 lb 4.8 oz), SpO2 100 %.' on RA  Gen: No distress.   Eyes: PERRL. No sclera icterus. No conjunctival injection.   ENT: No discharge. Pharynx clear.   Neck: Trachea midline. No obvious mass.    Resp: + rhonchi. No dullness on percussion.  CV: Regular rate. Regular rhythm. No murmur or rub. No edema. Peripheral pulses are 2+.  Capillary refill is less than 3 seconds.  GI: Non-tender. Non-distended. No hernia.   Skin: Warm and dry. No nodule on exposed extremities.   Lymph: No cervical LAD. No supraclavicular LAD.   M/S: No cyanosis. No joint deformity. No clubbing.   Neuro: Awake. Alert. Moves all four extremities.   Psych: Oriented x 3. No anxiety.     LABS:  CBC:   Recent Labs     01/02/24 1750 01/03/24 0714 01/05/24  0533   WBC 9.9 5.8 7.7   HGB 9.8* 8.5* 7.7*   HCT 29.8* 26.5* 23.3*   MCV 90.0 92.8 92.1    261 247       BMP:   Recent Labs     01/02/24 1750 01/02/24 1922 01/03/24  0714 01/05/24  0533   *  --  134* 135*   K  --  4.4 4.7 4.4   CL 88*  --  92* 95*   CO2 27  --  23 23   PHOS  --   --   --  4.4   BUN 72*  --  81* 59*   CREATININE 8.5*  --  9.5* 7.6*       LIVER PROFILE:   Recent Labs     01/02/24 1750 01/02/24 1922   AST  --  11*   ALT  --  11   BILITOT 0.3  --    ALKPHOS  --  137*       PT/INR:   Recent Labs     01/02/24  1750   PROTIME 13.7   INR 1.05       APTT: No results for input(s): \"APTT\" in the last 72

## 2024-01-05 NOTE — PROGRESS NOTES
Occupational Therapy and Physical Therapy    Order received and chart reviewed. Attempted evaluation multiple times this date. Patient at HD this morning and requesting to sleep this afternoon. OT/PT will follow up tomorrow as appropriate.         Renu Ramachandran, OTR/L #[id number]  Ciro Purcell, PT, DPT

## 2024-01-05 NOTE — PROGRESS NOTES
01/04/24 2333   NIV Type   NIV Started/Stopped On   Equipment Type v60   Mode Bilevel   Mask Type Full face mask   Mask Size Medium   Settings/Measurements   IPAP 18 cmH20   CPAP/EPAP 8 cmH2O   Vt (Measured) 818 mL   Rate Ordered 16   FiO2  30 %   I Time/ I Time % 0.9 s   Minute Volume (L/min) 14.8 Liters   Mask Leak (lpm) 5 lpm   Patient's Home Machine No   Alarm Settings   Alarms On Y   Low Pressure (cmH2O) 5 cmH2O   High Pressure (cmH2O) 40 cmH2O   Delay Alarm 20 sec(s)   RR Low (bpm) 16   RR High (bpm) 45 br/min

## 2024-01-05 NOTE — PLAN OF CARE
Problem: Respiratory - Adult  Goal: Achieves optimal ventilation and oxygenation  1/4/2024 2309 by Blank Ramos RN  Outcome: Progressing     Problem: Cardiovascular - Adult  Goal: Maintains optimal cardiac output and hemodynamic stability  1/4/2024 2309 by Blank Ramos RN  Outcome: Progressing     Problem: Cardiovascular - Adult  Goal: Absence of cardiac dysrhythmias or at baseline  1/4/2024 2309 by Blank Ramos RN  Outcome: Progressing     Problem: Discharge Planning  Goal: Discharge to home or other facility with appropriate resources  1/4/2024 2309 by Blank Ramos RN  Outcome: Progressing     Problem: Chronic Conditions and Co-morbidities  Goal: Patient's chronic conditions and co-morbidity symptoms are monitored and maintained or improved  1/4/2024 2309 by Blank Ramos RN  Outcome: Progressing     Problem: Skin/Tissue Integrity  Goal: Absence of new skin breakdown  Description: 1.  Monitor for areas of redness and/or skin breakdown  2.  Assess vascular access sites hourly  3.  Every 4-6 hours minimum:  Change oxygen saturation probe site  4.  Every 4-6 hours:  If on nasal continuous positive airway pressure, respiratory therapy assess nares and determine need for appliance change or resting period.  1/4/2024 2309 by Blank Ramos RN  Outcome: Progressing     Problem: Safety - Adult  Goal: Free from fall injury  1/4/2024 2309 by Blank Ramos RN  Outcome: Progressing     Problem: Nutrition Deficit:  Goal: Optimize nutritional status  Outcome: Progressing     Problem: Pain  Goal: Verbalizes/displays adequate comfort level or baseline comfort level  Outcome: Progressing

## 2024-01-06 VITALS
TEMPERATURE: 97.3 F | DIASTOLIC BLOOD PRESSURE: 54 MMHG | HEART RATE: 72 BPM | SYSTOLIC BLOOD PRESSURE: 116 MMHG | HEIGHT: 69 IN | WEIGHT: 200.84 LBS | RESPIRATION RATE: 18 BRPM | OXYGEN SATURATION: 98 % | BODY MASS INDEX: 29.75 KG/M2

## 2024-01-06 LAB
GLUCOSE BLD-MCNC: 224 MG/DL (ref 70–99)
GLUCOSE BLD-MCNC: 407 MG/DL (ref 70–99)
PERFORMED ON: ABNORMAL
PERFORMED ON: ABNORMAL

## 2024-01-06 PROCEDURE — 6370000000 HC RX 637 (ALT 250 FOR IP): Performed by: INTERNAL MEDICINE

## 2024-01-06 PROCEDURE — 99232 SBSQ HOSP IP/OBS MODERATE 35: CPT | Performed by: INTERNAL MEDICINE

## 2024-01-06 PROCEDURE — 94660 CPAP INITIATION&MGMT: CPT

## 2024-01-06 PROCEDURE — 2700000000 HC OXYGEN THERAPY PER DAY

## 2024-01-06 PROCEDURE — 94640 AIRWAY INHALATION TREATMENT: CPT

## 2024-01-06 PROCEDURE — 99238 HOSP IP/OBS DSCHRG MGMT 30/<: CPT | Performed by: INTERNAL MEDICINE

## 2024-01-06 PROCEDURE — 2580000003 HC RX 258: Performed by: INTERNAL MEDICINE

## 2024-01-06 PROCEDURE — 94761 N-INVAS EAR/PLS OXIMETRY MLT: CPT

## 2024-01-06 RX ORDER — INSULIN GLARGINE 100 [IU]/ML
15 INJECTION, SOLUTION SUBCUTANEOUS NIGHTLY
Qty: 10 ML | Refills: 3
Start: 2024-01-06

## 2024-01-06 RX ORDER — IPRATROPIUM BROMIDE AND ALBUTEROL SULFATE 2.5; .5 MG/3ML; MG/3ML
1 SOLUTION RESPIRATORY (INHALATION)
Status: DISCONTINUED | OUTPATIENT
Start: 2024-01-06 | End: 2024-01-06 | Stop reason: HOSPADM

## 2024-01-06 RX ORDER — INSULIN GLARGINE 100 [IU]/ML
10 INJECTION, SOLUTION SUBCUTANEOUS ONCE
Status: COMPLETED | OUTPATIENT
Start: 2024-01-06 | End: 2024-01-06

## 2024-01-06 RX ORDER — BUSPIRONE HYDROCHLORIDE 10 MG/1
10 TABLET ORAL 3 TIMES DAILY
Qty: 60 TABLET | Refills: 0 | Status: SHIPPED | OUTPATIENT
Start: 2024-01-06

## 2024-01-06 RX ORDER — PREDNISONE 20 MG/1
TABLET ORAL
Qty: 18 TABLET | Refills: 0 | Status: SHIPPED | OUTPATIENT
Start: 2024-01-06

## 2024-01-06 RX ADMIN — APIXABAN 5 MG: 5 TABLET, FILM COATED ORAL at 08:27

## 2024-01-06 RX ADMIN — Medication 10 ML: at 08:29

## 2024-01-06 RX ADMIN — INSULIN LISPRO 16 UNITS: 100 INJECTION, SOLUTION INTRAVENOUS; SUBCUTANEOUS at 08:29

## 2024-01-06 RX ADMIN — QUETIAPINE FUMARATE 12.5 MG: 25 TABLET ORAL at 08:26

## 2024-01-06 RX ADMIN — INSULIN GLARGINE 10 UNITS: 100 INJECTION, SOLUTION SUBCUTANEOUS at 08:49

## 2024-01-06 RX ADMIN — DILTIAZEM HYDROCHLORIDE 180 MG: 180 CAPSULE, COATED, EXTENDED RELEASE ORAL at 08:27

## 2024-01-06 RX ADMIN — PREDNISONE 40 MG: 20 TABLET ORAL at 08:27

## 2024-01-06 RX ADMIN — IPRATROPIUM BROMIDE AND ALBUTEROL SULFATE 1 DOSE: 2.5; .5 SOLUTION RESPIRATORY (INHALATION) at 07:47

## 2024-01-06 RX ADMIN — BUSPIRONE HYDROCHLORIDE 10 MG: 10 TABLET ORAL at 08:27

## 2024-01-06 RX ADMIN — DULOXETINE HYDROCHLORIDE 30 MG: 30 CAPSULE, DELAYED RELEASE ORAL at 08:27

## 2024-01-06 RX ADMIN — INSULIN LISPRO 4 UNITS: 100 INJECTION, SOLUTION INTRAVENOUS; SUBCUTANEOUS at 11:57

## 2024-01-06 RX ADMIN — ISOSORBIDE MONONITRATE 30 MG: 30 TABLET, EXTENDED RELEASE ORAL at 08:26

## 2024-01-06 NOTE — DISCHARGE SUMMARY
Name:  Igor Ann  Room:  /0319-01  MRN:    [id number]    IM Discharge Summary    Discharging Physician:  Gómez rodriguez MD    Admit: 1/2/2024    Discharge:      PCP      No primary care provider on file.    Diagnoses and hospital course  this Admission           #Acute on chronic hypoxic respiratory failure   #possible Fluid overload   -wears 4 L O2 baseline and CPAP nightly  - his HD schedule is changed for holidays and now coming with fluid overload  Imagine with no fluid overload but pt was hypoxic  Doubt pna   -requiring BIPAP at patient request, he does not want to wean off   -VBG with no hypercarbia      -pulmonology and nephrology consulted   -given IV lasix, resumed home torsemide  -HD done per schedule and fluid removed , although no clear hypoxia or distress, pt continues to seek for BIPAP 24/7 due to anxiety   - recurrent ER visits and multiple admissions for anxiety noted  - added buspar, seroquel   - avoid continuous bipap to avoid barotrauma  - dc planning ok today      #ESRD on HD  -HD MWF  - imaging with no fluid overload, electrolytes stable   -renal diet   - lowered than dry weight  -nephrology consulted     #Chronic systolic HF   -EF 25-30%  -last echo as above   -resumed home regimen toprol XL and torsemide   -cannot tolerate ACEI/ARB, or SGLT2/ aldactone 2/2 to ESRD        #Elevated troponin   -292--> 285  -no CP   -EKG without acute ischemic changes   -likely 2/2 to renal function      #Chronic normocytic anemia   -denies any s/s of bleeding  -likely 2/2 to renal disease   -monitor CBC      #COPD without AE  -continue prn inhalers      #CAD  -on plavix, statin, BB, imdur      #DM type 2   -SSI   -monitor BG      #Hx of VTE   -on eliquis      #HX of CVA   -plavix, statin      #Depression   -continue home regimen      #ZAINAB   -uses CPAP nightly     # Anxiety - severe and recurrent ER visits likely with this  Adding new meds as above       HPI  53 y.o. male with pmhx of COPD, CHF,  HCl  MG Tb24     DULoxetine 60 MG extended release capsule  Commonly known as: CYMBALTA  Take 1 capsule by mouth daily     Eliquis 5 MG Tabs tablet  Generic drug: apixaban     insulin glargine 100 UNIT/ML injection vial  Commonly known as: LANTUS  Inject 10 Units into the skin nightly     ipratropium 0.5 mg-albuterol 2.5 mg 0.5-2.5 (3) MG/3ML Soln nebulizer solution  Commonly known as: DuoNeb  Inhale 3 mLs into the lungs every 6 hours as needed for Shortness of Breath     isosorbide dinitrate 20 MG tablet  Commonly known as: ISORDIL  Take 1 tablet by mouth 3 times daily     metoprolol succinate 100 MG extended release tablet  Commonly known as: TOPROL XL  Take 1 tablet by mouth in the morning and at bedtime     pravastatin 40 MG tablet  Commonly known as: PRAVACHOL  TAKE 1 TABLET BY MOUTH DAILY     QUEtiapine 50 MG tablet  Commonly known as: SEROQUEL  TAKE 1 TABLET BY MOUTH EVERY EVENING     torsemide 100 MG tablet  Commonly known as: DEMADEX     traZODone 50 MG tablet  Commonly known as: DESYREL            STOP taking these medications      amoxicillin-clavulanate 875-125 MG per tablet  Commonly known as: AUGMENTIN     azithromycin 250 MG tablet  Commonly known as: ZITHROMAX     calcium carbonate 500 MG chewable tablet  Commonly known as: TUMS     midodrine 5 MG tablet  Commonly known as: PROAMATINE     omeprazole 20 MG delayed release capsule  Commonly known as: PRILOSEC     pantoprazole 40 MG tablet  Commonly known as: PROTONIX     Virt-Caps 1 MG Caps     vitamin D 1.25 MG (01674 UT) Caps capsule  Commonly known as: ERGOCALCIFEROL               Where to Get Your Medications        These medications were sent to [facility]  - [facility] [address] - P [phone] - F [phone]  [address] [address]      Phone: [phone]   busPIRone 10 MG tablet  predniSONE 20 MG tablet           Discharge Condition/Location: stable/home     Follow

## 2024-01-06 NOTE — PROGRESS NOTES
[facility] Progress Note           Subjective/   53 y.o. year old male who we are seeing in consultation for ESKD on HD.     HPI:  Last HD on 1/5 with 2 liters removed, post-weight of 91.1 kg, UF limited due to cramping.  States remains short of breath.    ROS:  +weak, intake adequate.    Objective/   GEN:  Chronically ill, BP (!) 116/54   Pulse 72   Temp 97.3 °F (36.3 °C) (Axillary)   Resp 18   Ht 1.753 m (5' 9.02\")   Wt 91.1 kg (200 lb 13.4 oz)   SpO2 98%   BMI 29.64 kg/m²   HEENT: non-icteric, no JVD  CV: S1, S2, tachycardic, without m/r/g; no LE edema  RESP: CTA B without w/r/r; breathing wnl  ABD: +bs, soft, nt, no hsm  SKIN: warm, no rashes  ACCESS: Left IJ TDC    Data/  Recent Labs     01/05/24  0533   WBC 7.7   HGB 7.7*   HCT 23.3*   MCV 92.1          Recent Labs     01/05/24  0533   *   K 4.4  4.4   CL 95*   CO2 23   GLUCOSE 171*   PHOS 4.4   BUN 59*   CREATININE 7.6*   LABGLOM 8*         Assessment/     - End stage kidney disease - on HD Mon-Wed-Fri     - Acute hypoxic, hypercarbic respiratory failure     - Anemia of chronic disease     - Hypertension    Plan/     - HD per MWF schedule UF as tolerated, under EDW of 94 kg, now ~91 kg  - Supplemental DAVON dosing with HD   - Trend labs, bp's, weights    Okay for discharge planning    ____________________________________  Ricky Carlos MD  [facility]  [email or web address]  Office: [phone]

## 2024-01-06 NOTE — PROGRESS NOTES
01/05/24 2313   NIV Type   Equipment Type v60   Mode Bilevel   Mask Type Full face mask   Mask Size Medium   Settings/Measurements   IPAP 18 cmH20   CPAP/EPAP 8 cmH2O   Vt (Measured) 681 mL   Rate Ordered 16   FiO2  30 %   Minute Volume (L/min) 15.7 Liters   Mask Leak (lpm) 20 lpm   Patient's Home Machine No   Patient Observation   Observations spo2 99% on 30% bipap

## 2024-01-06 NOTE — PLAN OF CARE
Problem: Chronic Conditions and Co-morbidities  Goal: Patient's chronic conditions and co-morbidity symptoms are monitored and maintained or improved  1/5/2024 2101 by Ayanna White, RN  Outcome: Progressing  HEART FAILURE CARE PLAN:    Comorbidities Reviewed: Yes   Patient has a past medical history of Ambulatory dysfunction, Aortic stenosis, Arthritis, Asthma, Bilateral hilar adenopathy syndrome, CAD (coronary artery disease), Cardiomyopathy ([facility]), CHF (congestive heart failure) ([facility]), Chronic pain, COPD (chronic obstructive pulmonary disease) ([facility]), CVA (cerebral vascular accident) ([facility]), Depression, Diabetes mellitus ([facility]), Difficult intravenous access, Emphysema of lung ([facility]), ESRD (end stage renal disease) on dialysis ([facility]), Fear of needles, Gastric ulcer, GERD (gastroesophageal reflux disease), Heart valve problem, Hemodialysis patient ([facility]), History of spinal fracture, Hx of blood clots, Hyperlipidemia, Hypertension, MI (myocardial infarction) ([facility]), Neuromuscular disorder ([facility]), Numbness and tingling in left arm, Pneumonia, PONV (postoperative nausea and vomiting), Prolonged emergence from general anesthesia, Sleep apnea, Stroke ([facility]), TIA (transient ischemic attack), and Unspecified diseases of blood and blood-forming organs.     ECHOCARDIOGRAM Reviewed: Yes   Patient's Ejection Fraction (EF) is less than 40%    Weights Reviewed: Yes   Admission weight: 95.3 kg (210 lb)   Wt Readings from Last 3 Encounters:   01/05/24 91.1 kg (200 lb 13.4 oz)   12/29/23 95.3 kg (210 lb)   12/24/23 95.7 kg (211 lb)     Intake & Output Reviewed: Yes     Intake/Output Summary (Last 24 hours) at 1/5/2024 2102  Last data filed at 1/5/2024 2010  Gross per 24 hour   Intake 640 ml   Output 75 ml   Net 565 ml     Medications Reviewed: Yes   SCHEDULED HOSPITAL MEDICATIONS:   epoetin siddharth-epbx  10,000 Units IntraVENous Once per day on Mon Wed Fri    busPIRone  10 mg Oral TID    QUEtiapine  12.5 mg Oral BID    ipratropium 0.5  mg-albuterol 2.5 mg  1 Dose Inhalation 4x Daily RT    dilTIAZem  180 mg Oral Daily    DULoxetine  30 mg Oral Daily    apixaban  5 mg Oral BID    pravastatin  20 mg Oral Nightly    traZODone  50 mg Oral Nightly    metoprolol succinate  50 mg Oral Nightly    isosorbide mononitrate  30 mg Oral Daily    insulin lispro  0-16 Units SubCUTAneous TID WC    insulin lispro  0-4 Units SubCUTAneous Nightly    sodium chloride flush  5-40 mL IntraVENous 2 times per day    predniSONE  40 mg Oral Daily     ACE/ARB/ARNI is REQUIRED for EF </= 40% SYSTOLIC FAILURE:   ACE:: None  ARB:: None  ARNI:: None    Evidenced-Based Beta Blocker is REQUIRED for EF </= 40% SYSTOLIC FAILURE:   :: Metoprolol SUCCinate- Toprol XL    Diuretics:  :: torsemide     Diet Reviewed: Yes   ADULT DIET; Regular; 4 carb choices (60 gm/meal); Low Potassium (Less than 3000 mg/day); Low Phosphorus (Less than 1000 mg); 1800 ml  ADULT ORAL NUTRITION SUPPLEMENT; Breakfast, Dinner; Renal Oral Supplement    Goal of Care Reviewed: Yes   Patient and/or Family's stated Goal of Care this Admission: reduce shortness of breath, increase activity tolerance, better understand heart failure and disease management, be more comfortable, and reduce lower extremity edema prior to discharge.

## 2024-01-06 NOTE — PROGRESS NOTES
Corrected erroneous charges. Please re-bill. RT Inhaler-Nebulizer Bronchodilator Protocol Note    There is a bronchodilator order in the chart from a provider indicating to follow the RT Bronchodilator Protocol and there is an “Initiate RT Inhaler-Nebulizer Bronchodilator Protocol” order as well (see protocol at bottom of note).    CXR Findings:  No results found.    The findings from the last RT Protocol Assessment were as follows:   History Pulmonary Disease: Chronic pulmonary disease  Respiratory Pattern: Dyspnea on exertion or RR 21-25 bpm  Breath Sounds: Slightly diminished and/or crackles  Cough: Strong, spontaneous, non-productive  Indication for Bronchodilator Therapy: Decreased or absent breath sounds  Bronchodilator Assessment Score: 6    Aerosolized bronchodilator medication orders have been revised according to the RT Inhaler-Nebulizer Bronchodilator Protocol below.    Respiratory Therapist to perform RT Therapy Protocol Assessment initially then follow the protocol.  Repeat RT Therapy Protocol Assessment PRN for score 0-3 or on second treatment, BID, and PRN for scores above 3.    No Indications - adjust the frequency to every 6 hours PRN wheezing or bronchospasm, if no treatments needed after 48 hours then discontinue using Per Protocol order mode.     If indication present, adjust the RT bronchodilator orders based on the Bronchodilator Assessment Score as indicated below.  Use Inhaler orders unless patient has one or more of the following: on home nebulizer, not able to hold breath for 10 seconds, is not alert and oriented, cannot activate and use MDI correctly, or respiratory rate 25 breaths per minute or more, then use the equivalent nebulizer order(s) with same Frequency and PRN reasons based on the score.  If a patient is on this medication at home then do not decrease Frequency below that used at home.    0-3 - enter or revise RT bronchodilator order(s) to equivalent RT Bronchodilator order with Frequency of every 4 hours PRN for wheezing

## 2024-01-06 NOTE — FLOWSHEET NOTE
01/03/24 1443   Vital Signs   Temp 97.4 °F (36.3 °C)   Temp Source Oral   Pulse 93   Heart Rate Source Monitor   Respirations 18   BP (!) 153/75   MAP (Calculated) 101   BP Location Right upper arm   BP Method Automatic   Patient Position Lying left side   Oxygen Therapy   SpO2 97 %   Pulse Oximetry Type Continuous   O2 Device PAP (positive airway pressure)   Skin Assessment Clean, dry, & intact   Skin Protection for O2 Device Yes   Location Nose   FiO2  30 %     Vitals stable. Patient denies further needs. Call light within reach.  
   01/04/24 1214   Vital Signs   Pulse (!) 113   Heart Rate Source Monitor   BP (!) 170/100   MAP (Calculated) 123   BP Location Right upper arm     VS as shown above. Dr. Hwang made aware. No new orders at this time.   
   01/06/24 0702   Vital Signs   Temp 97.3 °F (36.3 °C)   Temp Source Axillary   Pulse 72   Heart Rate Source Monitor   Respirations 18   BP (!) 116/54   MAP (Calculated) 75   BP Location Right upper arm   Patient Position Lying left side   Oxygen Therapy   SpO2 97 %   O2 Device PAP (positive airway pressure)     AM assessment completed, see flow sheet. Pt is alert and oriented. Vital signs are as noted - pt seems to run softer Bps in AM. Respirations are even & easy, unlabored at this time. No complaints voiced. Pt denies needs at this time. SR up x 2, and bed in low position. Call light is within reach.   
  Temperature Warm   R Radial Pulse +2 (Moderate)   LUE Neurovascular Assessment   Capillary Refill Less than/Equal to 3 seconds   Color Appropriate for Ethnicity   Temperature Warm   L Radial Pulse +2 (Moderate)   RLE Neurovascular Assessment   Capillary Refill Less than/Equal to 3 seconds   Color Dusky   Temperature Warm   R Pedal Pulse +2   LLE Neurovascular Assessment   Capillary Refill Greater than 3 seconds   Color Dusky   Temperature Warm   L Pedal Pulse +2   Skin Integumentary    Skin Integumentary (WDL) X   Skin Color Dusky   Skin Condition/Temp Dry   Skin Integrity Wound (see LDA)   Location Bilat feet   Musculoskeletal   Musculoskeletal (WDL) X   RUE Weakness   LUE Weakness   RL Extremity Weakness;Swelling   LL Extremity Weakness;Swelling   Musculoskeletal Details   R Ankle Swelling   R Foot Swelling;Limited movement   R Toes Swelling   Wound 12/08/23 Foot Right;Anterior   Date First Assessed/Time First Assessed: 12/08/23 0415   Present on Original Admission: Yes  Primary Wound Type: Diabetic Ulcer  Location: Foot  Wound Location Orientation: Right;Anterior   Wound Etiology Diabetic   Dressing Status Clean;Dry;Intact   Dressing/Treatment Xeroform;Silicone border   Offloading for Diabetic Foot Ulcers Offloading ordered   Dressing Change Due 01/07/24   Drainage Amount None (dry)   Odor None   Liberty-wound Assessment Dry/flaky   Wound 12/08/23 Toe (Comment  which one) Left;Anterior   Date First Assessed/Time First Assessed: 12/08/23 0424   Present on Original Admission: Yes  Primary Wound Type: Diabetic Ulcer  Location: Toe (Comment  which one)  Wound Location Orientation: Left;Anterior   Wound Etiology Diabetic   Dressing/Treatment Open to air   Offloading for Diabetic Foot Ulcers Offloading ordered   Wound Assessment Dry   Drainage Amount None (dry)   Odor None   Liberty-wound Assessment Dry/flaky

## 2024-01-06 NOTE — PROGRESS NOTES
Pt given written and verbal discharge instructions. Pt indicated understanding of home medication and care instructions. X2 Prescriptions called to pt preferred pharmacy. Pt packed own belongings. Pt taken down to family car via wheelchair.

## 2024-01-06 NOTE — PROGRESS NOTES
[facility]                                 Pulmonary/Critical care  Consult /Progress Note :                                                                  CC : Worsening shortness of breath      Subjective  Doing much better  SOB has improved  Use his CPAP  No chest pain   On 4 L   HR better controlled     PHYSICAL EXAM:  Blood pressure (!) 116/54, pulse 72, temperature 97.3 °F (36.3 °C), temperature source Axillary, resp. rate 18, height 1.753 m (5' 9.02\"), weight 91.1 kg (200 lb 13.4 oz), SpO2 98 %.' on RA  Gen: No distress.   Eyes: PERRL. No sclera icterus. No conjunctival injection.   ENT: No discharge. Pharynx clear.   Neck: Trachea midline. No obvious mass.    Resp: + rhonchi. No dullness on percussion.  CV: Regular rate. Regular rhythm. No murmur or rub. No edema. Peripheral pulses are 2+.  Capillary refill is less than 3 seconds.  GI: Non-tender. Non-distended. No hernia.   Skin: Warm and dry. No nodule on exposed extremities.   Lymph: No cervical LAD. No supraclavicular LAD.   M/S: No cyanosis. No joint deformity. No clubbing.   Neuro: Awake. Alert. Moves all four extremities.   Psych: Oriented x 3. No anxiety.     LABS:  CBC:   Recent Labs     01/05/24  0533   WBC 7.7   HGB 7.7*   HCT 23.3*   MCV 92.1          BMP:   Recent Labs     01/05/24  0533   *   K 4.4  4.4   CL 95*   CO2 23   PHOS 4.4   BUN 59*   CREATININE 7.6*       LIVER PROFILE:   No results for input(s): \"AST\", \"ALT\", \"LIPASE\", \"AMYLASE\", \"ALB\", \"BILIDIR\", \"BILITOT\", \"ALKPHOS\" in the last 72 hours.    PT/INR:   No results for input(s): \"PROTIME\", \"INR\" in the last 72 hours.    APTT: No results for input(s): \"APTT\" in the last 72 hours.  UA:No results for input(s): \"NITRITE\", \"COLORU\", \"PHUR\", \"LABCAST\", \"WBCUA\", \"RBCUA\", \"MUCUS\", \"TRICHOMONAS\", \"YEAST\", \"BACTERIA\", \"CLARITYU\", \"SPECGRAV\", \"LEUKOCYTESUR\", \"UROBILINOGEN\", \"BILIRUBINUR\",

## 2024-01-06 NOTE — PLAN OF CARE
Problem: Chronic Conditions and Co-morbidities  Goal: Patient's chronic conditions and co-morbidity symptoms are monitored and maintained or improved  1/5/2024 2110 by Ayanna White RN  Outcome: Progressing   TODAY'S DATE:  1/5/2024      Current NIHSS 0      Discussed personal risk factors for Stroke/TIA with patient/family, and ways to reduce the risk for a recurrent stroke.     Patient's personal risk factors which were identified are:   []   Alcohol Abuse: check with your physician before any alcohol consumption.   []   Atrial fibrillation: may cause blood clots  []   Drug Abuse: Seek help, talk with your doctor  []   Clotting Disorder  [x]   Diabetes  [x]   Family history of stroke or heart disease  []   High Blood Pressure/Hypertension: work with your physician  []   High cholesterol: monitor cholesterol levels with your physician  [x]   Overweight/Obesity: work with your physician for your ideal body weight  []   Physical Inactivity: get regular exercise as directed by your physician  [x]   Personal history of previous TIA or stroke  [x]   Poor Diet: decrease salt (sodium) in your diet, follow diet directed by physician  []   Smoking: stop smoking      Reviewed the Following Education with Patient and/or Family:   - Signs and Symptoms of Stroke  - Personal risk factors   - How to activate EMS (911)   - Importance of Follow Up Appointments at Discharge   - Importance of Compliance with Medications Prescribed at Discharge  - Available community resources and stroke advocacy groups if needed    Patient and/or family member: verbalized understanding.     Stroke Education booklet given to patient/family (or verified, if given already), which reviews above information. N/A         Electronically signed by Ayanna White RN on 1/5/2024 at 9:10 PM

## 2024-01-06 NOTE — CARE COORDINATION
DISCHARGE ORDER  Date/Time 2024 11:22 AM  Completed by: Teresa Boeck, RN, Case Management    Patient Name: Igor Ann      : 1970  Admitting Diagnosis: Dyspnea and respiratory abnormalities [R06.00, R06.89]  Fluid overload [E87.70]      Admit order Date and Status:1/3/24  (verify MD's last order for status of admission)      Noted discharge order.   If applicable PT/OT recommendation at Discharge: PT/OT:SNF if unable to progress. Patient declined OT/PT eval.   DME recommendation by Confirmed discharge plan: Yes  with whom - the patient  If pt confirmed DC plan does family need to be contacted by CM No if yes who______  Discharge Plan: The patient is discharging to home. Spouse will transport patient home.     Date of Last IMM Given: 24    Reviewed chart.  Role of discharge planner explained and patient verbalized understanding. Discharge order is noted.    Has Home O2 in place on admit:  Yes  Informed of need to bring portable home O2 tank on day of discharge for nursing to connect prior to leaving:   Yes  Verbalized agreement/Understanding:   Yes    Patient will discharge with his portable home 02.     Discharge timeout done with Va MORFIN. All discharge needs and concerns addressed.

## 2024-01-06 NOTE — PROGRESS NOTES
01/06/24 0328   NIV Type   Equipment Type v60   Mode Bilevel   Mask Type Full face mask   Mask Size Medium   Settings/Measurements   IPAP 18 cmH20   CPAP/EPAP 8 cmH2O   Vt (Measured) 654 mL   Rate Ordered 16   FiO2  30 %   Minute Volume (L/min) 10.8 Liters   Mask Leak (lpm) 10 lpm   Patient's Home Machine No   Patient Observation   Observations spo2 99% on 30% bipap

## 2024-01-06 NOTE — PLAN OF CARE
stability, deterioration, or improvement   Collaborate with multidisciplinary team to address chronic and comorbid conditions and prevent exacerbation or deterioration   Update acute care plan with appropriate goals if chronic or comorbid symptoms are exacerbated and prevent overall improvement and discharge

## 2024-01-06 NOTE — PROGRESS NOTES
01/05/24 2058   NIV Type   Equipment Type V60   Mode Bilevel   Mask Type Full face mask   Mask Size Medium   Assessment   Pulse 69   Respirations 22   SpO2 97 %   Level of Consciousness 0   Comfort Level Good   Using Accessory Muscles No   Mask Compliance Good   Settings/Measurements   IPAP 18 cmH20   CPAP/EPAP 8 cmH2O   Vt (Measured) 491 mL   Rate Ordered 16   FiO2  30 %   Minute Volume (L/min) 9.7 Liters   Mask Leak (lpm) 23 lpm   Patient's Home Machine No   Alarm Settings   Alarms On Y

## 2024-01-06 NOTE — CARE COORDINATION
CM delivered 2nd IMM delivered within 4 hours for DC, verbal explanation of patient rights at bedside. Pt voiced understanding of discharge MCR rights and is agreeable to discharge.

## 2024-01-06 NOTE — PLAN OF CARE
Problem: Respiratory - Adult  Goal: Achieves optimal ventilation and oxygenation  1/6/2024 1028 by Va Corona RN  Outcome: Adequate for Discharge  1/6/2024 0759 by Va Corona RN  Outcome: Progressing     Problem: Cardiovascular - Adult  Goal: Maintains optimal cardiac output and hemodynamic stability  1/6/2024 1028 by Va Corona RN  Outcome: Adequate for Discharge  1/6/2024 0759 by Va Corona RN  Outcome: Progressing  Goal: Absence of cardiac dysrhythmias or at baseline  1/6/2024 1028 by Va Corona RN  Outcome: Adequate for Discharge  1/6/2024 0759 by Va Corona RN  Outcome: Progressing     Problem: Discharge Planning  Goal: Discharge to home or other facility with appropriate resources  1/6/2024 1028 by Va Corona RN  Outcome: Adequate for Discharge  1/6/2024 0759 by Va Corona RN  Outcome: Progressing     Problem: Chronic Conditions and Co-morbidities  Goal: Patient's chronic conditions and co-morbidity symptoms are monitored and maintained or improved  1/6/2024 1028 by Va Corona RN  Outcome: Adequate for Discharge  1/6/2024 0759 by Va Corona RN  Outcome: Progressing  1/5/2024 2110 by Ayanna White RN  Outcome: Progressing  1/5/2024 2101 by Ayanna White RN  Outcome: Progressing     Problem: Skin/Tissue Integrity  Goal: Absence of new skin breakdown  Description: 1.  Monitor for areas of redness and/or skin breakdown  2.  Assess vascular access sites hourly  3.  Every 4-6 hours minimum:  Change oxygen saturation probe site  4.  Every 4-6 hours:  If on nasal continuous positive airway pressure, respiratory therapy assess nares and determine need for appliance change or resting period.  1/6/2024 1028 by Va Corona RN  Outcome: Adequate for Discharge  1/6/2024 0759 by Va Corona RN  Outcome: Progressing     Problem: Safety - Adult  Goal: Free from fall injury  1/6/2024 1028 by Va Corona, RN  Outcome: Adequate for Discharge  1/6/2024 0759 by Va Corona RN  Outcome: Progressing

## 2024-01-08 ENCOUNTER — CARE COORDINATION (OUTPATIENT)
Dept: CASE MANAGEMENT | Age: 54
End: 2024-01-08

## 2024-01-08 NOTE — CARE COORDINATION
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     CTN attempted follow-up outreach to patient. Message left including CTN contact information.     Follow Up  No future appointments.    Umm Schroeder RN  Care Transition Nurse  [phone] mobile

## 2024-01-09 ENCOUNTER — HOSPITAL ENCOUNTER (INPATIENT)
Age: 54
LOS: 6 days | Discharge: NURSING, REHAB OR LONG-TERM CARE | DRG: 640 | End: 2024-01-15
Attending: STUDENT IN AN ORGANIZED HEALTH CARE EDUCATION/TRAINING PROGRAM | Admitting: INTERNAL MEDICINE
Payer: MEDICARE

## 2024-01-09 ENCOUNTER — FOLLOWUP TELEPHONE ENCOUNTER (OUTPATIENT)
Dept: ADMINISTRATIVE | Age: 54
End: 2024-01-09

## 2024-01-09 ENCOUNTER — CARE COORDINATION (OUTPATIENT)
Dept: CASE MANAGEMENT | Age: 54
End: 2024-01-09

## 2024-01-09 ENCOUNTER — APPOINTMENT (OUTPATIENT)
Dept: GENERAL RADIOLOGY | Age: 54
DRG: 640 | End: 2024-01-09
Payer: MEDICARE

## 2024-01-09 DIAGNOSIS — E08.10 DIABETIC KETOACIDOSIS WITHOUT COMA ASSOCIATED WITH DIABETES MELLITUS DUE TO UNDERLYING CONDITION (HCC): ICD-10-CM

## 2024-01-09 DIAGNOSIS — R06.02 SHORTNESS OF BREATH: Primary | ICD-10-CM

## 2024-01-09 DIAGNOSIS — J96.01 ACUTE RESPIRATORY FAILURE WITH HYPOXIA (HCC): ICD-10-CM

## 2024-01-09 PROBLEM — E11.10 DIABETIC KETOACIDOSIS WITHOUT COMA ASSOCIATED WITH TYPE 2 DIABETES MELLITUS (HCC): Status: ACTIVE | Noted: 2024-01-09

## 2024-01-09 PROBLEM — R79.89 ELEVATED TROPONIN: Status: ACTIVE | Noted: 2024-01-09

## 2024-01-09 PROBLEM — Z91.199 NON-COMPLIANCE: Status: ACTIVE | Noted: 2024-01-09

## 2024-01-09 PROBLEM — J96.20 ACUTE ON CHRONIC RESPIRATORY FAILURE (HCC): Status: ACTIVE | Noted: 2024-01-09

## 2024-01-09 LAB
ALBUMIN SERPL-MCNC: 4.1 G/DL (ref 3.4–5)
ALBUMIN SERPL-MCNC: 4.4 G/DL (ref 3.4–5)
ALBUMIN/GLOB SERPL: 1.4 {RATIO} (ref 1.1–2.2)
ALBUMIN/GLOB SERPL: 1.4 {RATIO} (ref 1.1–2.2)
ALP SERPL-CCNC: 155 U/L (ref 40–129)
ALP SERPL-CCNC: 167 U/L (ref 40–129)
ALT SERPL-CCNC: 13 U/L (ref 10–40)
ALT SERPL-CCNC: 15 U/L (ref 10–40)
ANION GAP SERPL CALCULATED.3IONS-SCNC: 22 MMOL/L (ref 3–16)
ANION GAP SERPL CALCULATED.3IONS-SCNC: 22 MMOL/L (ref 3–16)
ANION GAP SERPL CALCULATED.3IONS-SCNC: 23 MMOL/L (ref 3–16)
AST SERPL-CCNC: 14 U/L (ref 15–37)
AST SERPL-CCNC: 8 U/L (ref 15–37)
BASE EXCESS BLDV CALC-SCNC: -7.9 MMOL/L (ref -3–3)
BASE EXCESS BLDV CALC-SCNC: -9.8 MMOL/L (ref -3–3)
BASOPHILS # BLD: 0 K/UL (ref 0–0.2)
BASOPHILS NFR BLD: 0 %
BETA-HYDROXYBUTYRATE: 0.1 MMOL/L (ref 0–0.27)
BILIRUB SERPL-MCNC: 0.3 MG/DL (ref 0–1)
BILIRUB SERPL-MCNC: 0.3 MG/DL (ref 0–1)
BUN SERPL-MCNC: 113 MG/DL (ref 7–20)
BUN SERPL-MCNC: 116 MG/DL (ref 7–20)
BUN SERPL-MCNC: 120 MG/DL (ref 7–20)
CALCIUM SERPL-MCNC: 9.1 MG/DL (ref 8.3–10.6)
CALCIUM SERPL-MCNC: 9.2 MG/DL (ref 8.3–10.6)
CALCIUM SERPL-MCNC: 9.4 MG/DL (ref 8.3–10.6)
CHLORIDE SERPL-SCNC: 91 MMOL/L (ref 99–110)
CHLORIDE SERPL-SCNC: 93 MMOL/L (ref 99–110)
CHLORIDE SERPL-SCNC: 93 MMOL/L (ref 99–110)
CO2 BLDV-SCNC: 18 MMOL/L
CO2 BLDV-SCNC: 18 MMOL/L
CO2 SERPL-SCNC: 17 MMOL/L (ref 21–32)
CO2 SERPL-SCNC: 17 MMOL/L (ref 21–32)
CO2 SERPL-SCNC: 20 MMOL/L (ref 21–32)
COHGB MFR BLDV: 2.7 % (ref 0–1.5)
COHGB MFR BLDV: 3 % (ref 0–1.5)
CREAT SERPL-MCNC: 10.2 MG/DL (ref 0.9–1.3)
CREAT SERPL-MCNC: 10.3 MG/DL (ref 0.9–1.3)
CREAT SERPL-MCNC: 9.8 MG/DL (ref 0.9–1.3)
DEPRECATED RDW RBC AUTO: 18.9 % (ref 12.4–15.4)
EKG ATRIAL RATE: 108 BPM
EKG DIAGNOSIS: NORMAL
EKG Q-T INTERVAL: 338 MS
EKG QRS DURATION: 112 MS
EKG QTC CALCULATION (BAZETT): 452 MS
EKG R AXIS: 129 DEGREES
EKG T AXIS: -29 DEGREES
EKG VENTRICULAR RATE: 108 BPM
EOSINOPHIL # BLD: 0 K/UL (ref 0–0.6)
EOSINOPHIL NFR BLD: 0 %
FLUAV RNA RESP QL NAA+PROBE: NOT DETECTED
FLUBV RNA RESP QL NAA+PROBE: NOT DETECTED
GFR SERPLBLD CREATININE-BSD FMLA CKD-EPI: 5 ML/MIN/{1.73_M2}
GFR SERPLBLD CREATININE-BSD FMLA CKD-EPI: 5 ML/MIN/{1.73_M2}
GFR SERPLBLD CREATININE-BSD FMLA CKD-EPI: 6 ML/MIN/{1.73_M2}
GLUCOSE BLD-MCNC: 151 MG/DL (ref 70–99)
GLUCOSE BLD-MCNC: 169 MG/DL (ref 70–99)
GLUCOSE BLD-MCNC: 202 MG/DL (ref 70–99)
GLUCOSE BLD-MCNC: 216 MG/DL (ref 70–99)
GLUCOSE BLD-MCNC: 273 MG/DL (ref 70–99)
GLUCOSE BLD-MCNC: 331 MG/DL (ref 70–99)
GLUCOSE BLD-MCNC: 334 MG/DL (ref 70–99)
GLUCOSE BLD-MCNC: 445 MG/DL (ref 70–99)
GLUCOSE BLD-MCNC: 470 MG/DL (ref 70–99)
GLUCOSE BLD-MCNC: 503 MG/DL (ref 70–99)
GLUCOSE SERPL-MCNC: 261 MG/DL (ref 70–99)
GLUCOSE SERPL-MCNC: 457 MG/DL (ref 70–99)
GLUCOSE SERPL-MCNC: 532 MG/DL (ref 70–99)
HCO3 BLDV-SCNC: 16.5 MMOL/L (ref 23–29)
HCO3 BLDV-SCNC: 17.3 MMOL/L (ref 23–29)
HCT VFR BLD AUTO: 34 % (ref 40.5–52.5)
HGB BLD-MCNC: 10.7 G/DL (ref 13.5–17.5)
LYMPHOCYTES # BLD: 0.2 K/UL (ref 1–5.1)
LYMPHOCYTES NFR BLD: 1.4 %
MCH RBC QN AUTO: 29.7 PG (ref 26–34)
MCHC RBC AUTO-ENTMCNC: 31.4 G/DL (ref 31–36)
MCV RBC AUTO: 94.6 FL (ref 80–100)
METHGB MFR BLDV: 0.3 %
METHGB MFR BLDV: 0.3 %
MONOCYTES # BLD: 0.3 K/UL (ref 0–1.3)
MONOCYTES NFR BLD: 2.4 %
NEUTROPHILS # BLD: 11.8 K/UL (ref 1.7–7.7)
NEUTROPHILS NFR BLD: 96.2 %
O2 CT VFR BLDV CALC: 14 VOL %
O2 THERAPY: ABNORMAL
O2 THERAPY: ABNORMAL
PCO2 BLDV: 34.1 MMHG (ref 40–50)
PCO2 BLDV: 37.7 MMHG (ref 40–50)
PERFORMED ON: ABNORMAL
PH BLDV: 7.26 [PH] (ref 7.35–7.45)
PH BLDV: 7.32 [PH] (ref 7.35–7.45)
PLATELET # BLD AUTO: 272 K/UL (ref 135–450)
PMV BLD AUTO: 8.5 FL (ref 5–10.5)
PO2 BLDV: 66.6 MMHG (ref 25–40)
PO2 BLDV: 95.3 MMHG (ref 25–40)
POTASSIUM SERPL-SCNC: 4.9 MMOL/L (ref 3.5–5.1)
POTASSIUM SERPL-SCNC: 5.6 MMOL/L (ref 3.5–5.1)
POTASSIUM SERPL-SCNC: 5.9 MMOL/L (ref 3.5–5.1)
PROCALCITONIN SERPL IA-MCNC: 0.28 NG/ML (ref 0–0.15)
PROCALCITONIN SERPL IA-MCNC: 0.41 NG/ML (ref 0–0.15)
PROT SERPL-MCNC: 7.1 G/DL (ref 6.4–8.2)
PROT SERPL-MCNC: 7.5 G/DL (ref 6.4–8.2)
RBC # BLD AUTO: 3.59 M/UL (ref 4.2–5.9)
RSV AG NOSE QL: NEGATIVE
SAO2 % BLDV: 91 %
SAO2 % BLDV: 97 %
SARS-COV-2 RNA RESP QL NAA+PROBE: NOT DETECTED
SODIUM SERPL-SCNC: 131 MMOL/L (ref 136–145)
SODIUM SERPL-SCNC: 132 MMOL/L (ref 136–145)
SODIUM SERPL-SCNC: 135 MMOL/L (ref 136–145)
TROPONIN, HIGH SENSITIVITY: 159 NG/L (ref 0–22)
TROPONIN, HIGH SENSITIVITY: 210 NG/L (ref 0–22)
TROPONIN, HIGH SENSITIVITY: 231 NG/L (ref 0–22)
WBC # BLD AUTO: 12.3 K/UL (ref 4–11)

## 2024-01-09 PROCEDURE — 96375 TX/PRO/DX INJ NEW DRUG ADDON: CPT

## 2024-01-09 PROCEDURE — 6370000000 HC RX 637 (ALT 250 FOR IP)

## 2024-01-09 PROCEDURE — 87636 SARSCOV2 & INF A&B AMP PRB: CPT

## 2024-01-09 PROCEDURE — 84145 PROCALCITONIN (PCT): CPT

## 2024-01-09 PROCEDURE — 85025 COMPLETE CBC W/AUTO DIFF WBC: CPT

## 2024-01-09 PROCEDURE — 2580000003 HC RX 258: Performed by: INTERNAL MEDICINE

## 2024-01-09 PROCEDURE — 80053 COMPREHEN METABOLIC PANEL: CPT

## 2024-01-09 PROCEDURE — 94660 CPAP INITIATION&MGMT: CPT

## 2024-01-09 PROCEDURE — 84484 ASSAY OF TROPONIN QUANT: CPT

## 2024-01-09 PROCEDURE — 94761 N-INVAS EAR/PLS OXIMETRY MLT: CPT

## 2024-01-09 PROCEDURE — 99291 CRITICAL CARE FIRST HOUR: CPT | Performed by: INTERNAL MEDICINE

## 2024-01-09 PROCEDURE — 6360000002 HC RX W HCPCS

## 2024-01-09 PROCEDURE — 6360000002 HC RX W HCPCS: Performed by: INTERNAL MEDICINE

## 2024-01-09 PROCEDURE — 6370000000 HC RX 637 (ALT 250 FOR IP): Performed by: STUDENT IN AN ORGANIZED HEALTH CARE EDUCATION/TRAINING PROGRAM

## 2024-01-09 PROCEDURE — 93005 ELECTROCARDIOGRAM TRACING: CPT | Performed by: STUDENT IN AN ORGANIZED HEALTH CARE EDUCATION/TRAINING PROGRAM

## 2024-01-09 PROCEDURE — 71045 X-RAY EXAM CHEST 1 VIEW: CPT

## 2024-01-09 PROCEDURE — 6360000002 HC RX W HCPCS: Performed by: STUDENT IN AN ORGANIZED HEALTH CARE EDUCATION/TRAINING PROGRAM

## 2024-01-09 PROCEDURE — 96374 THER/PROPH/DIAG INJ IV PUSH: CPT

## 2024-01-09 PROCEDURE — 36415 COLL VENOUS BLD VENIPUNCTURE: CPT

## 2024-01-09 PROCEDURE — 87807 RSV ASSAY W/OPTIC: CPT

## 2024-01-09 PROCEDURE — 5A09557 ASSISTANCE WITH RESPIRATORY VENTILATION, GREATER THAN 96 CONSECUTIVE HOURS, CONTINUOUS POSITIVE AIRWAY PRESSURE: ICD-10-PCS | Performed by: INTERNAL MEDICINE

## 2024-01-09 PROCEDURE — 2000000000 HC ICU R&B

## 2024-01-09 PROCEDURE — 82010 KETONE BODYS QUAN: CPT

## 2024-01-09 PROCEDURE — 93010 ELECTROCARDIOGRAM REPORT: CPT | Performed by: INTERNAL MEDICINE

## 2024-01-09 PROCEDURE — 82803 BLOOD GASES ANY COMBINATION: CPT

## 2024-01-09 PROCEDURE — 94640 AIRWAY INHALATION TREATMENT: CPT

## 2024-01-09 PROCEDURE — 99285 EMERGENCY DEPT VISIT HI MDM: CPT

## 2024-01-09 PROCEDURE — 2580000003 HC RX 258

## 2024-01-09 PROCEDURE — 99223 1ST HOSP IP/OBS HIGH 75: CPT | Performed by: INTERNAL MEDICINE

## 2024-01-09 PROCEDURE — 83036 HEMOGLOBIN GLYCOSYLATED A1C: CPT

## 2024-01-09 PROCEDURE — 2700000000 HC OXYGEN THERAPY PER DAY

## 2024-01-09 RX ORDER — HYDRALAZINE HYDROCHLORIDE 20 MG/ML
10 INJECTION INTRAMUSCULAR; INTRAVENOUS EVERY 6 HOURS PRN
Status: DISCONTINUED | OUTPATIENT
Start: 2024-01-09 | End: 2024-01-15 | Stop reason: HOSPADM

## 2024-01-09 RX ORDER — BUSPIRONE HYDROCHLORIDE 10 MG/1
10 TABLET ORAL 3 TIMES DAILY
Status: DISCONTINUED | OUTPATIENT
Start: 2024-01-09 | End: 2024-01-15 | Stop reason: HOSPADM

## 2024-01-09 RX ORDER — SODIUM CHLORIDE 9 MG/ML
INJECTION, SOLUTION INTRAVENOUS PRN
Status: DISCONTINUED | OUTPATIENT
Start: 2024-01-09 | End: 2024-01-15 | Stop reason: HOSPADM

## 2024-01-09 RX ORDER — ONDANSETRON 2 MG/ML
4 INJECTION INTRAMUSCULAR; INTRAVENOUS EVERY 6 HOURS PRN
Status: DISCONTINUED | OUTPATIENT
Start: 2024-01-09 | End: 2024-01-15 | Stop reason: HOSPADM

## 2024-01-09 RX ORDER — POLYETHYLENE GLYCOL 3350 17 G/17G
17 POWDER, FOR SOLUTION ORAL DAILY PRN
Status: DISCONTINUED | OUTPATIENT
Start: 2024-01-09 | End: 2024-01-15 | Stop reason: HOSPADM

## 2024-01-09 RX ORDER — DEXTROSE MONOHYDRATE 100 MG/ML
INJECTION, SOLUTION INTRAVENOUS CONTINUOUS PRN
Status: DISCONTINUED | OUTPATIENT
Start: 2024-01-09 | End: 2024-01-15 | Stop reason: HOSPADM

## 2024-01-09 RX ORDER — GLUCAGON 1 MG/ML
1 KIT INJECTION PRN
Status: DISCONTINUED | OUTPATIENT
Start: 2024-01-09 | End: 2024-01-15 | Stop reason: HOSPADM

## 2024-01-09 RX ORDER — CLOPIDOGREL BISULFATE 75 MG/1
75 TABLET ORAL DAILY
Status: DISCONTINUED | OUTPATIENT
Start: 2024-01-09 | End: 2024-01-15 | Stop reason: HOSPADM

## 2024-01-09 RX ORDER — IPRATROPIUM BROMIDE AND ALBUTEROL SULFATE 2.5; .5 MG/3ML; MG/3ML
1 SOLUTION RESPIRATORY (INHALATION)
Status: DISCONTINUED | OUTPATIENT
Start: 2024-01-09 | End: 2024-01-09

## 2024-01-09 RX ORDER — PRAVASTATIN SODIUM 40 MG
40 TABLET ORAL
Status: DISCONTINUED | OUTPATIENT
Start: 2024-01-09 | End: 2024-01-15 | Stop reason: HOSPADM

## 2024-01-09 RX ORDER — NITROGLYCERIN 0.4 MG/1
0.4 TABLET SUBLINGUAL ONCE
Status: COMPLETED | OUTPATIENT
Start: 2024-01-09 | End: 2024-01-09

## 2024-01-09 RX ORDER — DEXTROSE AND SODIUM CHLORIDE 5; .45 G/100ML; G/100ML
INJECTION, SOLUTION INTRAVENOUS CONTINUOUS PRN
Status: DISCONTINUED | OUTPATIENT
Start: 2024-01-09 | End: 2024-01-09

## 2024-01-09 RX ORDER — ONDANSETRON 4 MG/1
4 TABLET, ORALLY DISINTEGRATING ORAL EVERY 8 HOURS PRN
Status: DISCONTINUED | OUTPATIENT
Start: 2024-01-09 | End: 2024-01-15 | Stop reason: HOSPADM

## 2024-01-09 RX ORDER — MAGNESIUM SULFATE IN WATER 40 MG/ML
2000 INJECTION, SOLUTION INTRAVENOUS PRN
Status: DISCONTINUED | OUTPATIENT
Start: 2024-01-09 | End: 2024-01-10

## 2024-01-09 RX ORDER — ISOSORBIDE DINITRATE 20 MG/1
20 TABLET ORAL 3 TIMES DAILY
Status: DISCONTINUED | OUTPATIENT
Start: 2024-01-09 | End: 2024-01-10

## 2024-01-09 RX ORDER — SODIUM CHLORIDE 0.9 % (FLUSH) 0.9 %
10 SYRINGE (ML) INJECTION PRN
Status: DISCONTINUED | OUTPATIENT
Start: 2024-01-09 | End: 2024-01-15 | Stop reason: HOSPADM

## 2024-01-09 RX ORDER — DULOXETIN HYDROCHLORIDE 60 MG/1
60 CAPSULE, DELAYED RELEASE ORAL DAILY
Status: DISCONTINUED | OUTPATIENT
Start: 2024-01-09 | End: 2024-01-15 | Stop reason: HOSPADM

## 2024-01-09 RX ORDER — DEXTROSE AND SODIUM CHLORIDE 5; .45 G/100ML; G/100ML
INJECTION, SOLUTION INTRAVENOUS CONTINUOUS
Status: DISCONTINUED | OUTPATIENT
Start: 2024-01-09 | End: 2024-01-10

## 2024-01-09 RX ORDER — ACETAMINOPHEN 325 MG/1
650 TABLET ORAL EVERY 6 HOURS PRN
Status: DISCONTINUED | OUTPATIENT
Start: 2024-01-09 | End: 2024-01-15 | Stop reason: HOSPADM

## 2024-01-09 RX ORDER — METOPROLOL SUCCINATE 50 MG/1
100 TABLET, EXTENDED RELEASE ORAL 2 TIMES DAILY
Status: DISCONTINUED | OUTPATIENT
Start: 2024-01-09 | End: 2024-01-10

## 2024-01-09 RX ORDER — POTASSIUM CHLORIDE 7.45 MG/ML
10 INJECTION INTRAVENOUS PRN
Status: DISCONTINUED | OUTPATIENT
Start: 2024-01-09 | End: 2024-01-10

## 2024-01-09 RX ORDER — DILTIAZEM HYDROCHLORIDE 180 MG/1
180 CAPSULE, COATED, EXTENDED RELEASE ORAL DAILY
Status: DISCONTINUED | OUTPATIENT
Start: 2024-01-09 | End: 2024-01-10

## 2024-01-09 RX ORDER — SODIUM CHLORIDE 9 MG/ML
INJECTION, SOLUTION INTRAVENOUS CONTINUOUS
Status: DISCONTINUED | OUTPATIENT
Start: 2024-01-09 | End: 2024-01-09

## 2024-01-09 RX ORDER — SODIUM CHLORIDE 0.9 % (FLUSH) 0.9 %
5-40 SYRINGE (ML) INJECTION EVERY 12 HOURS SCHEDULED
Status: DISCONTINUED | OUTPATIENT
Start: 2024-01-09 | End: 2024-01-15 | Stop reason: HOSPADM

## 2024-01-09 RX ORDER — QUETIAPINE FUMARATE 25 MG/1
50 TABLET, FILM COATED ORAL NIGHTLY
Status: DISCONTINUED | OUTPATIENT
Start: 2024-01-09 | End: 2024-01-15 | Stop reason: HOSPADM

## 2024-01-09 RX ORDER — ALBUTEROL SULFATE 2.5 MG/3ML
10 SOLUTION RESPIRATORY (INHALATION) ONCE
Status: COMPLETED | OUTPATIENT
Start: 2024-01-09 | End: 2024-01-09

## 2024-01-09 RX ORDER — TORSEMIDE 100 MG/1
100 TABLET ORAL DAILY
Status: DISCONTINUED | OUTPATIENT
Start: 2024-01-09 | End: 2024-01-15 | Stop reason: HOSPADM

## 2024-01-09 RX ORDER — ACETAMINOPHEN 650 MG/1
650 SUPPOSITORY RECTAL EVERY 6 HOURS PRN
Status: DISCONTINUED | OUTPATIENT
Start: 2024-01-09 | End: 2024-01-15 | Stop reason: HOSPADM

## 2024-01-09 RX ORDER — OXYCODONE HYDROCHLORIDE AND ACETAMINOPHEN 5; 325 MG/1; MG/1
1 TABLET ORAL
Status: COMPLETED | OUTPATIENT
Start: 2024-01-09 | End: 2024-01-09

## 2024-01-09 RX ORDER — LABETALOL HYDROCHLORIDE 5 MG/ML
10 INJECTION, SOLUTION INTRAVENOUS EVERY 6 HOURS PRN
Status: DISCONTINUED | OUTPATIENT
Start: 2024-01-09 | End: 2024-01-15 | Stop reason: HOSPADM

## 2024-01-09 RX ORDER — FUROSEMIDE 10 MG/ML
80 INJECTION INTRAMUSCULAR; INTRAVENOUS ONCE
Status: COMPLETED | OUTPATIENT
Start: 2024-01-09 | End: 2024-01-09

## 2024-01-09 RX ORDER — IPRATROPIUM BROMIDE AND ALBUTEROL SULFATE 2.5; .5 MG/3ML; MG/3ML
1 SOLUTION RESPIRATORY (INHALATION)
Status: DISCONTINUED | OUTPATIENT
Start: 2024-01-09 | End: 2024-01-11

## 2024-01-09 RX ADMIN — PRAVASTATIN SODIUM 40 MG: 40 TABLET ORAL at 20:45

## 2024-01-09 RX ADMIN — APIXABAN 5 MG: 5 TABLET, FILM COATED ORAL at 20:45

## 2024-01-09 RX ADMIN — INSULIN HUMAN 5 UNITS: 100 INJECTION, SOLUTION PARENTERAL at 13:36

## 2024-01-09 RX ADMIN — DILTIAZEM HYDROCHLORIDE 180 MG: 180 CAPSULE, COATED, EXTENDED RELEASE ORAL at 17:29

## 2024-01-09 RX ADMIN — LABETALOL HYDROCHLORIDE 10 MG: 5 INJECTION, SOLUTION INTRAVENOUS at 16:22

## 2024-01-09 RX ADMIN — CLOPIDOGREL BISULFATE 75 MG: 75 TABLET ORAL at 17:29

## 2024-01-09 RX ADMIN — METOPROLOL SUCCINATE 100 MG: 50 TABLET, EXTENDED RELEASE ORAL at 20:45

## 2024-01-09 RX ADMIN — OXYCODONE AND ACETAMINOPHEN 1 TABLET: 5; 325 TABLET ORAL at 16:22

## 2024-01-09 RX ADMIN — NITROGLYCERIN 0.4 MG: 0.4 TABLET SUBLINGUAL at 16:22

## 2024-01-09 RX ADMIN — TORSEMIDE 100 MG: 100 TABLET ORAL at 17:46

## 2024-01-09 RX ADMIN — ALBUTEROL SULFATE 10 MG: 2.5 SOLUTION RESPIRATORY (INHALATION) at 19:16

## 2024-01-09 RX ADMIN — INSULIN HUMAN 10 UNITS: 100 INJECTION, SOLUTION PARENTERAL at 16:32

## 2024-01-09 RX ADMIN — FUROSEMIDE 80 MG: 10 INJECTION, SOLUTION INTRAMUSCULAR; INTRAVENOUS at 11:47

## 2024-01-09 RX ADMIN — BUSPIRONE HYDROCHLORIDE 10 MG: 10 TABLET ORAL at 16:22

## 2024-01-09 RX ADMIN — DEXTROSE AND SODIUM CHLORIDE: 5; 450 INJECTION, SOLUTION INTRAVENOUS at 20:44

## 2024-01-09 RX ADMIN — SODIUM CHLORIDE 8.2 UNITS/HR: 9 INJECTION, SOLUTION INTRAVENOUS at 16:36

## 2024-01-09 RX ADMIN — DULOXETINE HYDROCHLORIDE 60 MG: 60 CAPSULE, DELAYED RELEASE ORAL at 17:29

## 2024-01-09 RX ADMIN — BUSPIRONE HYDROCHLORIDE 10 MG: 10 TABLET ORAL at 20:45

## 2024-01-09 RX ADMIN — ISOSORBIDE DINITRATE 20 MG: 20 TABLET ORAL at 20:45

## 2024-01-09 RX ADMIN — QUETIAPINE FUMARATE 50 MG: 25 TABLET ORAL at 20:45

## 2024-01-09 RX ADMIN — ISOSORBIDE DINITRATE 20 MG: 20 TABLET ORAL at 17:46

## 2024-01-09 ASSESSMENT — PAIN DESCRIPTION - ORIENTATION
ORIENTATION: LOWER
ORIENTATION: LOWER

## 2024-01-09 ASSESSMENT — PAIN DESCRIPTION - LOCATION
LOCATION: BACK
LOCATION: BACK

## 2024-01-09 ASSESSMENT — PAIN SCALES - GENERAL
PAINLEVEL_OUTOF10: 0
PAINLEVEL_OUTOF10: 10
PAINLEVEL_OUTOF10: 10

## 2024-01-09 ASSESSMENT — PAIN DESCRIPTION - DESCRIPTORS
DESCRIPTORS: STABBING
DESCRIPTORS: STABBING

## 2024-01-09 NOTE — ED PROVIDER NOTES
[facility] ED      CHIEF COMPLAINT  Shortness of Breath (HD patient missed dialysis twice.  60% en route.  Placed on CPAP could barely get him to 70%.  )       HISTORY OF PRESENT ILLNESS  Igor Ann is a 53 y.o. male  who presents to the ED complaining of shortness of breath.  EMS reports that patient missed dialysis yesterday and did not make it to a make up dialysis today due to acutely developing severe shortness of breath after going to the restroom.  He is found to be oxygen sats in the 50s on their arrival and placed on nonrebreather with minimal improvement.  Placed on CPAP with sats in the 60s and route.  On arrival did place the patient on oximetry and he was 100%.  He is currently still reporting shortness of breath, though he does have clear lung sounds and is 100% on BiPAP.    No other complaints, modifying factors or associated symptoms.     I have reviewed the following from the nursing documentation.    Past Medical History:   Diagnosis Date    Ambulatory dysfunction     walker for long distances, SOB with distance    Aortic stenosis     echo 2017    Arthritis     hands and hips    Asthma     Bilateral hilar adenopathy syndrome 06/03/2013    CAD (coronary artery disease)     Dr. Javier Chanel [facility]    Cardiomyopathy ([facility]) 04/19/2019    EF= 43%    CHF (congestive heart failure) ([facility])     Chronic pain     COPD (chronic obstructive pulmonary disease) ([facility])     pulmonology Dr. Batista    CVA (cerebral vascular accident) ([facility]) 05/21/2017    Depression     Diabetes mellitus ([facility])     borderline    Difficult intravenous access     Emphysema of lung ([facility])     ESRD (end stage renal disease) on dialysis ([facility])     MWF    Fear of needles     Gastric ulcer     GERD (gastroesophageal reflux disease)     Heart valve problem     bicuspic valve    Hemodialysis patient ([facility])     History of spinal fracture     work incident    Hx of blood clots     Bilateral lower extremities; stents in place     mg (has no administration in time range)     Or   acetaminophen (TYLENOL) suppository 650 mg (has no administration in time range)   labetalol (NORMODYNE;TRANDATE) injection 10 mg (10 mg IntraVENous Given 1/9/24 1622)   glucose chewable tablet 16 g (has no administration in time range)   dextrose bolus 10% 125 mL (has no administration in time range)     Or   dextrose bolus 10% 250 mL (has no administration in time range)   glucagon injection 1 mg (has no administration in time range)   dextrose 10 % infusion (has no administration in time range)   dextrose bolus 10% 125 mL (has no administration in time range)     Or   dextrose bolus 10% 250 mL (has no administration in time range)   potassium chloride 10 mEq/100 mL IVPB (Peripheral Line) (has no administration in time range)   magnesium sulfate 2000 mg in 50 mL IVPB premix (has no administration in time range)   sodium phosphate 15 mmol in sodium chloride 0.9 % 250 mL IVPB (has no administration in time range)   insulin regular (HUMULIN R;NOVOLIN R) 100 Units in sodium chloride 0.9 % 100 mL infusion (11.55 Units/hr IntraVENous Rate/Dose Change 1/9/24 1732)   dilTIAZem (CARDIZEM CD) extended release capsule 180 mg (180 mg Oral Given 1/9/24 1729)   hydrALAZINE (APRESOLINE) injection 10 mg (has no administration in time range)   ipratropium 0.5 mg-albuterol 2.5 mg (DUONEB) nebulizer solution 1 Dose (has no administration in time range)   furosemide (LASIX) injection 80 mg (80 mg IntraVENous Given 1/9/24 1147)   insulin regular (HUMULIN R;NOVOLIN R) injection 5 Units (5 Units IntraVENous Given 1/9/24 1336)   insulin regular (HUMULIN R;NOVOLIN R) injection 10 Units (10 Units IntraVENous Given 1/9/24 1632)   oxyCODONE-acetaminophen (PERCOCET) 5-325 MG per tablet 1 tablet (1 tablet Oral Given 1/9/24 1622)   nitroGLYCERIN (NITROSTAT) SL tablet 0.4 mg (0.4 mg SubLINGual Given 1/9/24 1622)        Shola PRITCHETT DO,  am the primary clinician of record.    CLINICAL

## 2024-01-09 NOTE — TELEPHONE ENCOUNTER
Heart Failure Follow-up Call:     1st Attempt by Lelia Smith Transition RN. No Answer 1/8    2nd Attempt by Lelia Smith Transition RN. No Answer 1/9    3rd Attempt; No Answer- Patient is now in the ED for evaluation. 1/9    Electronically signed by Blank Rogel MSN, RN  on 1/9/2024 at 11:46 AM

## 2024-01-09 NOTE — PROGRESS NOTES
do not decrease Frequency below that used at home.    0-3 - enter or revise RT bronchodilator order(s) to equivalent RT Bronchodilator order with Frequency of every 4 hours PRN for wheezing or increased work of breathing using Per Protocol order mode.        4-6 - enter or revise RT Bronchodilator order(s) to two equivalent RT bronchodilator orders with one order with BID Frequency and one order with Frequency of every 4 hours PRN wheezing or increased work of breathing using Per Protocol order mode.        7-10 - enter or revise RT Bronchodilator order(s) to two equivalent RT bronchodilator orders with one order with TID Frequency and one order with Frequency of every 4 hours PRN wheezing or increased work of breathing using Per Protocol order mode.       11-13 - enter or revise RT Bronchodilator order(s) to one equivalent RT bronchodilator order with QID Frequency and an Albuterol order with Frequency of every 4 hours PRN wheezing or increased work of breathing using Per Protocol order mode.      Greater than 13 - enter or revise RT Bronchodilator order(s) to one equivalent RT bronchodilator order with every 4 hours Frequency and an Albuterol order with Frequency of every 2 hours PRN wheezing or increased work of breathing using Per Protocol order mode.     RT to enter RT Home Evaluation for COPD & MDI Assessment order using Per Protocol order mode.    Electronically signed by Yuly Lui RCP on 1/9/2024 at 5:49 PM

## 2024-01-09 NOTE — CARE COORDINATION
Care Transitions Initial Follow Up Call    Call within 2 business days of discharge: Yes    Patient: Igor Ann Patient : 1970   MRN: [id number]  Reason for Admission: 4 days -> acute on chronic hypoxic resp failure, possible fluid overload, wears 4L O2 baseline and CPAP HS, ESRD on HD MWF, chronic systolic CHF, elevated troponin, chronic normocytic anemia, COPD w/o AE, CAD, DM2, hx VTE on Eliquis, hx CVA on Plavix, depression, ZAINAB on CPAP, anxiety-recurrent ED visits -> home no services, baseline O2 4L ([employer]), HD MWF [facility], PT rec SNF but no AM-PAC score on eval  Discharge Date: 24 RARS: Readmission Risk Score: 51      Last Discharge Facility       Date Complaint Diagnosis Description Type Department Provider    24 Shortness of Breath Dyspnea and respiratory abnormalities ED to Hosp-Admission (Discharged) (ADMITTED) [facility] Maty Arroyo MD; Randolph Hanson..     CTN attempted follow-up outreach to patient. Message left including CTN contact information. Care transition program closed. No further CTN outreach scheduled.     Follow Up  No future appointments.    Umm Schroeder RN  Care Transition Nurse  [phone] mobile

## 2024-01-09 NOTE — ED NOTES
Unable to collect trop @ this time due to ivs not drawing back blood and L arm restrictions. Working on USPIV now

## 2024-01-09 NOTE — ED NOTES
Discharge instructions reviewed with patient.  All question answered.  Pt verbalized understanding.

## 2024-01-09 NOTE — ED NOTES
Writer went into pts room to obtain blood sample and another IV. Pt states \"you get one chance\" writer explained why we needed to obtain blood work because his IV's don't draw. Pt states \"if you don't get this, I'm refusing my blood work\". Writer verbalized understanding and explained what blood work was being drawn and how important it was , pt verbalizes understanding and stated that he still doesn't want any more blood draws.

## 2024-01-09 NOTE — PROGRESS NOTES
01/09/24 1140   NIV Type   $NIV $Daily Charge   Suction Setup and Functional Yes   NIV Started/Stopped On   Equipment Type V60   Mode Bilevel   Mask Type Full face mask   Mask Size Large   Assessment   SpO2 100 %   Level of Consciousness 0   Comfort Level Good   Using Accessory Muscles Yes   Mask Compliance Good   Skin Assessment Clean, dry, & intact   Skin Protection for O2 Device Yes   Orientation Middle   Location Nose   Breath Sounds   Right Upper Lobe Diminished   Right Middle Lobe Diminished   Right Lower Lobe Diminished   Left Upper Lobe Diminished   Left Lower Lobe Diminished   Settings/Measurements   PIP Observed 13 cm H20   IPAP 12 cmH20   CPAP/EPAP 6 cmH2O   Vt (Measured) 651 mL   Rate Ordered 16   FiO2  100 %   I Time/ I Time % 1 s   Minute Volume (L/min) 15.2 Liters   Mask Leak (lpm) 52 lpm   Patient's Home Machine No   Alarm Settings   Alarms On Y   Low Pressure (cmH2O) 8 cmH2O   High Pressure (cmH2O) 30 cmH2O   Apnea (secs) 20 secs   RR Low (bpm) 16   RR High (bpm) 40 br/min

## 2024-01-09 NOTE — CONSULTS
Reason for referral and CC: Resp failure, DKA    HISTORY OF PRESENT ILLNESS: 54 yo male with a history of consistent medical noncompliance, COPD, end-stage renal disease on dialysis presented with shortness of breath.  He was post have dialysis today but felt too short of breath and came to the emergency room.  He was hypoxic on his home oxygen and is requiring BiPAP to maintain oxygenation.  He also has significant hyperglycemia with an elevated anion gap.  Blood pressure was severely elevated in the ER and he was given Nitropaste.      Past Medical History:   Diagnosis Date    Ambulatory dysfunction     walker for long distances, SOB with distance    Aortic stenosis     echo 2017    Arthritis     hands and hips    Asthma     Bilateral hilar adenopathy syndrome 06/03/2013    CAD (coronary artery disease)     Dr. Javier Chanel [facility]    Cardiomyopathy ([facility]) 04/19/2019    EF= 43%    CHF (congestive heart failure) ([facility])     Chronic pain     COPD (chronic obstructive pulmonary disease) ([facility])     pulmonology Dr. Batista    CVA (cerebral vascular accident) ([facility]) 05/21/2017    Depression     Diabetes mellitus ([facility])     borderline    Difficult intravenous access     Emphysema of lung ([facility])     ESRD (end stage renal disease) on dialysis ([facility])     MWF    Fear of needles     Gastric ulcer     GERD (gastroesophageal reflux disease)     Heart valve problem     bicuspic valve    Hemodialysis patient ([facility])     History of spinal fracture     work incident    Hx of blood clots     Bilateral lower extremities; stents in place    Hyperlipidemia     Hypertension     MI (myocardial infarction) ([facility]) 2019    has had 9 MIs. 2019 was the last    Neuromuscular disorder ([facility])     due to CVA    Numbness and tingling in left arm     from fistula    Pneumonia     PONV (postoperative nausea and vomiting)     Prolonged emergence from general anesthesia     States requires more medication than most people    Sleep apnea     Uses CPAP    Stroke    ALKPHOS 167*     No results for input(s): \"PROTIME\", \"INR\" in the last 72 hours.  No results for input(s): \"NITRITE\", \"COLORU\", \"PHUR\", \"LABCAST\", \"WBCUA\", \"RBCUA\", \"MUCUS\", \"TRICHOMONAS\", \"YEAST\", \"BACTERIA\", \"CLARITYU\", \"SPECGRAV\", \"LEUKOCYTESUR\", \"UROBILINOGEN\", \"BILIRUBINUR\", \"BLOODU\", \"GLUCOSEU\", \"AMORPHOUS\" in the last 72 hours.    Invalid input(s): \"KETONESU\"  No results for input(s): \"PHART\", \"NTD4DRQ\", \"PO2ART\" in the last 72 hours.  PCT 0.41    Chest imaging was reviewed by me and showed Small bilateral pleural effusions with bibasilar airspace disease.  This  could reflect pneumonia in the appropriate clinical setting.    ASSESSMENT:  Acute on chronic hypoxic respiratory failure   AGMA - probably contributed to largely by kidney disease and needing HD, but also possibly element of DKA  ESRD on HD  Acute on chronic systolic CHF  COPD  ZAINAB on bipap    PLAN:  Noninvasive positive pressure ventilation per my orders  Supplemental oxygen to maintain SaO2 >92%; wean as tolerated  Duonebs  DKA protocol but no IVF until glucose is < 250 and then run D51/2 NS at 125 hr until AG is closed. D/w nurse.  Sputum GS&C.  Resume BP meds and torsemide  Nephro planning HD tonight  SQ Heprain for DVT prophylaxis  Due to at least single organ failure and risk of rapid deterioration, I spent 31 minutes of Critical care time reviewing labs/films, examining patient, collaborating with other physicians. This does not include time performing critical procedures.     Thank you Gómez Hwang MD for this consult

## 2024-01-09 NOTE — PROGRESS NOTES
Pt arrived to ICU room #2 from ED on BiPap. Pt awake, alert & oriented. New orders released.     HR 90 o2 100% on BiPap 100% fio2 bp 190/77 (110) RR 24     FSBS 334     4 Eyes Skin Assessment     NAME:  Igor Ann  YOB: 1970  MEDICAL RECORD NUMBER:  [id number]    The patient is being assessed for  Admission    I agree that at least one RN has performed a thorough Head to Toe Skin Assessment on the patient. ALL assessment sites listed below have been assessed.      Areas assessed by both nurses:    Head, Face, Ears, Shoulders, Back, Chest, Arms, Elbows, Hands, Sacrum. Buttock, Coccyx, Ischium, Legs. Feet and Heels, and Under Medical Devices         Does the Patient have a Wound? Yes wound(s) were present on assessment. LDA wound assessment was Initiated and completed by RN                 Isaac Prevention initiated by RN: Yes  Wound Care Orders initiated by RN: No    Pressure Injury (Stage 3,4, Unstageable, DTI, NWPT, and Complex wounds) if present, place Wound referral order by RN under : Yes    New Ostomies, if present place, Ostomy referral order under : No     Nurse 1 eSignature: Electronically signed by Kari Corbett RN on 1/9/24 at 6:31 PM EST    **SHARE this note so that the co-signing nurse can place an eSignature**    Nurse 2 eSignature: Electronically signed by Genevieve Hill RN on 1/9/24 at 6:40 PM EST

## 2024-01-10 LAB
ANION GAP SERPL CALCULATED.3IONS-SCNC: 22 MMOL/L (ref 3–16)
ANION GAP SERPL CALCULATED.3IONS-SCNC: 22 MMOL/L (ref 3–16)
BASE EXCESS BLDV CALC-SCNC: -5.1 MMOL/L (ref -3–3)
BASOPHILS # BLD: 0 K/UL (ref 0–0.2)
BASOPHILS NFR BLD: 0.3 %
BUN SERPL-MCNC: 141 MG/DL (ref 7–20)
BUN SERPL-MCNC: 142 MG/DL (ref 7–20)
CALCIUM SERPL-MCNC: 8.1 MG/DL (ref 8.3–10.6)
CALCIUM SERPL-MCNC: 8.6 MG/DL (ref 8.3–10.6)
CHLORIDE SERPL-SCNC: 93 MMOL/L (ref 99–110)
CHLORIDE SERPL-SCNC: 93 MMOL/L (ref 99–110)
CO2 BLDV-SCNC: 19 MMOL/L
CO2 SERPL-SCNC: 18 MMOL/L (ref 21–32)
CO2 SERPL-SCNC: 19 MMOL/L (ref 21–32)
COHGB MFR BLDV: 3 % (ref 0–1.5)
CREAT SERPL-MCNC: 10.2 MG/DL (ref 0.9–1.3)
CREAT SERPL-MCNC: 10.3 MG/DL (ref 0.9–1.3)
DEPRECATED RDW RBC AUTO: 19.8 % (ref 12.4–15.4)
EKG ATRIAL RATE: 70 BPM
EKG DIAGNOSIS: NORMAL
EKG P AXIS: -56 DEGREES
EKG P-R INTERVAL: 164 MS
EKG Q-T INTERVAL: 424 MS
EKG QRS DURATION: 108 MS
EKG QTC CALCULATION (BAZETT): 457 MS
EKG R AXIS: -47 DEGREES
EKG T AXIS: 132 DEGREES
EKG VENTRICULAR RATE: 70 BPM
EOSINOPHIL # BLD: 0 K/UL (ref 0–0.6)
EOSINOPHIL NFR BLD: 0.1 %
GFR SERPLBLD CREATININE-BSD FMLA CKD-EPI: 5 ML/MIN/{1.73_M2}
GFR SERPLBLD CREATININE-BSD FMLA CKD-EPI: 5 ML/MIN/{1.73_M2}
GLUCOSE BLD-MCNC: 109 MG/DL (ref 70–99)
GLUCOSE BLD-MCNC: 140 MG/DL (ref 70–99)
GLUCOSE BLD-MCNC: 149 MG/DL (ref 70–99)
GLUCOSE BLD-MCNC: 154 MG/DL (ref 70–99)
GLUCOSE BLD-MCNC: 173 MG/DL (ref 70–99)
GLUCOSE BLD-MCNC: 210 MG/DL (ref 70–99)
GLUCOSE BLD-MCNC: 223 MG/DL (ref 70–99)
GLUCOSE SERPL-MCNC: 150 MG/DL (ref 70–99)
GLUCOSE SERPL-MCNC: 167 MG/DL (ref 70–99)
HCO3 BLDV-SCNC: 18.4 MMOL/L (ref 23–29)
HCT VFR BLD AUTO: 26.8 % (ref 40.5–52.5)
HGB BLD-MCNC: 8.7 G/DL (ref 13.5–17.5)
LACTATE BLDV-SCNC: 0.9 MMOL/L (ref 0.4–2)
LYMPHOCYTES # BLD: 0.4 K/UL (ref 1–5.1)
LYMPHOCYTES NFR BLD: 4.2 %
MCH RBC QN AUTO: 30.6 PG (ref 26–34)
MCHC RBC AUTO-ENTMCNC: 32.5 G/DL (ref 31–36)
MCV RBC AUTO: 94 FL (ref 80–100)
METHGB MFR BLDV: 0.3 %
MONOCYTES # BLD: 0.6 K/UL (ref 0–1.3)
MONOCYTES NFR BLD: 5.3 %
NEUTROPHILS # BLD: 9.4 K/UL (ref 1.7–7.7)
NEUTROPHILS NFR BLD: 90.1 %
O2 CT VFR BLDV CALC: 13 VOL %
O2 THERAPY: ABNORMAL
PCO2 BLDV: 28.6 MMHG (ref 40–50)
PERFORMED ON: ABNORMAL
PH BLDV: 7.43 [PH] (ref 7.35–7.45)
PLATELET # BLD AUTO: 184 K/UL (ref 135–450)
PMV BLD AUTO: 8.3 FL (ref 5–10.5)
PO2 BLDV: 164.8 MMHG (ref 25–40)
POTASSIUM SERPL-SCNC: 5.4 MMOL/L (ref 3.5–5.1)
POTASSIUM SERPL-SCNC: 5.6 MMOL/L (ref 3.5–5.1)
RBC # BLD AUTO: 2.85 M/UL (ref 4.2–5.9)
SAO2 % BLDV: 99 %
SODIUM SERPL-SCNC: 133 MMOL/L (ref 136–145)
SODIUM SERPL-SCNC: 134 MMOL/L (ref 136–145)
WBC # BLD AUTO: 10.4 K/UL (ref 4–11)

## 2024-01-10 PROCEDURE — 6370000000 HC RX 637 (ALT 250 FOR IP)

## 2024-01-10 PROCEDURE — 2580000003 HC RX 258

## 2024-01-10 PROCEDURE — 2500000003 HC RX 250 WO HCPCS: Performed by: INTERNAL MEDICINE

## 2024-01-10 PROCEDURE — 6370000000 HC RX 637 (ALT 250 FOR IP): Performed by: INTERNAL MEDICINE

## 2024-01-10 PROCEDURE — 80048 BASIC METABOLIC PNL TOTAL CA: CPT

## 2024-01-10 PROCEDURE — 2700000000 HC OXYGEN THERAPY PER DAY

## 2024-01-10 PROCEDURE — 2000000000 HC ICU R&B

## 2024-01-10 PROCEDURE — 6360000002 HC RX W HCPCS: Performed by: INTERNAL MEDICINE

## 2024-01-10 PROCEDURE — 82803 BLOOD GASES ANY COMBINATION: CPT

## 2024-01-10 PROCEDURE — 83605 ASSAY OF LACTIC ACID: CPT

## 2024-01-10 PROCEDURE — 93005 ELECTROCARDIOGRAM TRACING: CPT

## 2024-01-10 PROCEDURE — 94640 AIRWAY INHALATION TREATMENT: CPT

## 2024-01-10 PROCEDURE — 85025 COMPLETE CBC W/AUTO DIFF WBC: CPT

## 2024-01-10 PROCEDURE — P9047 ALBUMIN (HUMAN), 25%, 50ML: HCPCS | Performed by: INTERNAL MEDICINE

## 2024-01-10 PROCEDURE — 94761 N-INVAS EAR/PLS OXIMETRY MLT: CPT

## 2024-01-10 PROCEDURE — 99291 CRITICAL CARE FIRST HOUR: CPT | Performed by: INTERNAL MEDICINE

## 2024-01-10 PROCEDURE — 36415 COLL VENOUS BLD VENIPUNCTURE: CPT

## 2024-01-10 PROCEDURE — 5A1D70Z PERFORMANCE OF URINARY FILTRATION, INTERMITTENT, LESS THAN 6 HOURS PER DAY: ICD-10-PCS | Performed by: INTERNAL MEDICINE

## 2024-01-10 PROCEDURE — 94660 CPAP INITIATION&MGMT: CPT

## 2024-01-10 PROCEDURE — 2580000003 HC RX 258: Performed by: INTERNAL MEDICINE

## 2024-01-10 PROCEDURE — 99233 SBSQ HOSP IP/OBS HIGH 50: CPT | Performed by: INTERNAL MEDICINE

## 2024-01-10 PROCEDURE — 90935 HEMODIALYSIS ONE EVALUATION: CPT

## 2024-01-10 RX ORDER — DEXMEDETOMIDINE HYDROCHLORIDE 4 UG/ML
.1-1.5 INJECTION, SOLUTION INTRAVENOUS CONTINUOUS
Status: DISCONTINUED | OUTPATIENT
Start: 2024-01-10 | End: 2024-01-11

## 2024-01-10 RX ORDER — INSULIN GLARGINE 100 [IU]/ML
10 INJECTION, SOLUTION SUBCUTANEOUS NIGHTLY
Status: DISCONTINUED | OUTPATIENT
Start: 2024-01-10 | End: 2024-01-15 | Stop reason: HOSPADM

## 2024-01-10 RX ORDER — INSULIN GLARGINE 100 [IU]/ML
10 INJECTION, SOLUTION SUBCUTANEOUS ONCE
Status: COMPLETED | OUTPATIENT
Start: 2024-01-10 | End: 2024-01-10

## 2024-01-10 RX ORDER — MIDODRINE HYDROCHLORIDE 10 MG/1
10 TABLET ORAL ONCE
Status: COMPLETED | OUTPATIENT
Start: 2024-01-10 | End: 2024-01-10

## 2024-01-10 RX ORDER — SODIUM BICARBONATE 650 MG/1
1300 TABLET ORAL ONCE
Status: COMPLETED | OUTPATIENT
Start: 2024-01-10 | End: 2024-01-10

## 2024-01-10 RX ORDER — INSULIN LISPRO 100 [IU]/ML
0-4 INJECTION, SOLUTION INTRAVENOUS; SUBCUTANEOUS ONCE
Status: DISCONTINUED | OUTPATIENT
Start: 2024-01-10 | End: 2024-01-15 | Stop reason: HOSPADM

## 2024-01-10 RX ORDER — 0.9 % SODIUM CHLORIDE 0.9 %
500 INTRAVENOUS SOLUTION INTRAVENOUS ONCE
Status: COMPLETED | OUTPATIENT
Start: 2024-01-10 | End: 2024-01-10

## 2024-01-10 RX ORDER — ALBUMIN (HUMAN) 12.5 G/50ML
25 SOLUTION INTRAVENOUS ONCE
Status: COMPLETED | OUTPATIENT
Start: 2024-01-10 | End: 2024-01-10

## 2024-01-10 RX ORDER — DEXTROSE MONOHYDRATE 100 MG/ML
INJECTION, SOLUTION INTRAVENOUS CONTINUOUS PRN
Status: DISCONTINUED | OUTPATIENT
Start: 2024-01-10 | End: 2024-01-10 | Stop reason: SDUPTHER

## 2024-01-10 RX ORDER — GLUCAGON 1 MG/ML
1 KIT INJECTION PRN
Status: DISCONTINUED | OUTPATIENT
Start: 2024-01-10 | End: 2024-01-10 | Stop reason: SDUPTHER

## 2024-01-10 RX ORDER — HEPARIN SODIUM 1000 [USP'U]/ML
3200 INJECTION, SOLUTION INTRAVENOUS; SUBCUTANEOUS PRN
Status: DISCONTINUED | OUTPATIENT
Start: 2024-01-10 | End: 2024-01-15 | Stop reason: HOSPADM

## 2024-01-10 RX ORDER — IPRATROPIUM BROMIDE AND ALBUTEROL SULFATE 2.5; .5 MG/3ML; MG/3ML
1 SOLUTION RESPIRATORY (INHALATION) EVERY 4 HOURS PRN
Status: DISCONTINUED | OUTPATIENT
Start: 2024-01-10 | End: 2024-01-15 | Stop reason: HOSPADM

## 2024-01-10 RX ORDER — INSULIN LISPRO 100 [IU]/ML
0-4 INJECTION, SOLUTION INTRAVENOUS; SUBCUTANEOUS EVERY 4 HOURS
Status: DISCONTINUED | OUTPATIENT
Start: 2024-01-10 | End: 2024-01-15 | Stop reason: HOSPADM

## 2024-01-10 RX ADMIN — IPRATROPIUM BROMIDE AND ALBUTEROL SULFATE 1 DOSE: 2.5; .5 SOLUTION RESPIRATORY (INHALATION) at 19:01

## 2024-01-10 RX ADMIN — INSULIN LISPRO 1 UNITS: 100 INJECTION, SOLUTION INTRAVENOUS; SUBCUTANEOUS at 11:47

## 2024-01-10 RX ADMIN — SODIUM CHLORIDE 500 ML: 9 INJECTION, SOLUTION INTRAVENOUS at 02:20

## 2024-01-10 RX ADMIN — IPRATROPIUM BROMIDE AND ALBUTEROL SULFATE 1 DOSE: 2.5; .5 SOLUTION RESPIRATORY (INHALATION) at 07:40

## 2024-01-10 RX ADMIN — APIXABAN 5 MG: 5 TABLET, FILM COATED ORAL at 10:39

## 2024-01-10 RX ADMIN — Medication 0.2 MCG/KG/HR: at 15:51

## 2024-01-10 RX ADMIN — ACETAMINOPHEN 650 MG: 325 TABLET ORAL at 03:58

## 2024-01-10 RX ADMIN — Medication 10 ML: at 20:55

## 2024-01-10 RX ADMIN — INSULIN LISPRO 1 UNITS: 100 INJECTION, SOLUTION INTRAVENOUS; SUBCUTANEOUS at 07:53

## 2024-01-10 RX ADMIN — DULOXETINE HYDROCHLORIDE 60 MG: 60 CAPSULE, DELAYED RELEASE ORAL at 10:39

## 2024-01-10 RX ADMIN — QUETIAPINE FUMARATE 50 MG: 25 TABLET ORAL at 20:54

## 2024-01-10 RX ADMIN — INSULIN GLARGINE 10 UNITS: 100 INJECTION, SOLUTION SUBCUTANEOUS at 02:14

## 2024-01-10 RX ADMIN — SODIUM BICARBONATE: 84 INJECTION, SOLUTION INTRAVENOUS at 05:55

## 2024-01-10 RX ADMIN — PRAVASTATIN SODIUM 40 MG: 40 TABLET ORAL at 20:54

## 2024-01-10 RX ADMIN — BUSPIRONE HYDROCHLORIDE 10 MG: 10 TABLET ORAL at 10:39

## 2024-01-10 RX ADMIN — SODIUM BICARBONATE 1300 MG: 650 TABLET ORAL at 02:14

## 2024-01-10 RX ADMIN — IPRATROPIUM BROMIDE AND ALBUTEROL SULFATE 1 DOSE: 2.5; .5 SOLUTION RESPIRATORY (INHALATION) at 10:26

## 2024-01-10 RX ADMIN — CLOPIDOGREL BISULFATE 75 MG: 75 TABLET ORAL at 10:39

## 2024-01-10 RX ADMIN — EPOETIN ALFA-EPBX 10000 UNITS: 10000 INJECTION, SOLUTION INTRAVENOUS; SUBCUTANEOUS at 18:28

## 2024-01-10 RX ADMIN — MIDODRINE HYDROCHLORIDE 10 MG: 10 TABLET ORAL at 08:41

## 2024-01-10 RX ADMIN — BUSPIRONE HYDROCHLORIDE 10 MG: 10 TABLET ORAL at 20:54

## 2024-01-10 RX ADMIN — TORSEMIDE 100 MG: 100 TABLET ORAL at 11:47

## 2024-01-10 RX ADMIN — IPRATROPIUM BROMIDE AND ALBUTEROL SULFATE 1 DOSE: 2.5; .5 SOLUTION RESPIRATORY (INHALATION) at 15:04

## 2024-01-10 RX ADMIN — APIXABAN 5 MG: 5 TABLET, FILM COATED ORAL at 20:54

## 2024-01-10 RX ADMIN — MUPIROCIN: 20 OINTMENT TOPICAL at 20:54

## 2024-01-10 RX ADMIN — ALBUMIN (HUMAN) 25 G: 0.25 INJECTION, SOLUTION INTRAVENOUS at 08:40

## 2024-01-10 RX ADMIN — INSULIN GLARGINE 10 UNITS: 100 INJECTION, SOLUTION SUBCUTANEOUS at 20:55

## 2024-01-10 RX ADMIN — Medication 10 ML: at 02:15

## 2024-01-10 ASSESSMENT — PAIN SCALES - GENERAL
PAINLEVEL_OUTOF10: 0
PAINLEVEL_OUTOF10: 6
PAINLEVEL_OUTOF10: 6

## 2024-01-10 ASSESSMENT — PAIN DESCRIPTION - LOCATION
LOCATION: NECK
LOCATION: NECK

## 2024-01-10 ASSESSMENT — PAIN DESCRIPTION - FREQUENCY: FREQUENCY: CONTINUOUS

## 2024-01-10 ASSESSMENT — PAIN DESCRIPTION - PAIN TYPE: TYPE: OTHER (COMMENT)

## 2024-01-10 ASSESSMENT — PAIN DESCRIPTION - DESCRIPTORS: DESCRIPTORS: ACHING

## 2024-01-10 ASSESSMENT — PAIN - FUNCTIONAL ASSESSMENT: PAIN_FUNCTIONAL_ASSESSMENT: ACTIVITIES ARE NOT PREVENTED

## 2024-01-10 NOTE — PROGRESS NOTES
Reassessment completed. No major changes. Pt now 60% FiO2 on Bipap and SpO2 100%. . Insulin gtt infusing at 2.73 u/hr.     Electronically signed by Jasmin Zhu RN on 1/9/2024 at 11:42 PM

## 2024-01-10 NOTE — PROGRESS NOTES
01/09/24 2300   NIV Type   NIV Started/Stopped On   Equipment Type v60   Mode Bilevel   Mask Type Full face mask   Mask Size Large   Assessment   Pulse 77   SpO2 100 %   Settings/Measurements   IPAP 12 cmH20   CPAP/EPAP 6 cmH2O   Vt (Measured) 712 mL   Rate Ordered 16   FiO2  (S)  75 %  (decreased to 60%)   I Time/ I Time % 1 s   Minute Volume (L/min) 7.3 Liters   Mask Leak (lpm) 4 lpm   Patient's Home Machine No

## 2024-01-10 NOTE — PROGRESS NOTES
Blood pressure (!) 94/46, pulse 69, temperature 97.3 °F (36.3 °C), temperature source Temporal, resp. rate 20, height 1.753 m (5' 9\"), weight 91.5 kg (201 lb 11.2 oz), SpO2 100 %.    Shift assessment completed. Pt A&O on BIPAP. Did not tolerate removal with RT this AM due to feeling SOB. SR on monitor, BP soft but taking in LE due to limb restriction and R arm PIVs. Anuric. Remains NPO due to BIPAP. Pending HD today. Glucose covered with SS coverage, drip off since 0203. Repositions self in bed. No further needs known at this time. Electronically signed by Genevieve Hill RN on 1/10/2024 at 8:24 AM

## 2024-01-10 NOTE — CONSULTS
c/w mainly prior infarction; mild darrick-infarct ischemia near the mid-inferior segment; LV size severely dilated. EF= 34%.  [facility] 5/09/23 severe MVCAD; EF=30-35% and global HK; elevated LV end-diastolic at 24 with successful IVUS guided PATRICIA insertion to RI, native CCx, and RCA.   Now presents with c/o SOB mainly. He was recently admitted on 1/02-1/6/2024 with dyspnea.   Patient arrived by EMS. He missed dialysis twice and reports not taking medication. He was satting in the 50's upon their arrival.  In ER he was satting 100% on BiPAP. Note BP severely elevated > 200/100. Reports waking up with SOB and left chest pressure.  Admission Testing:  CXR small bilateral pleural effusions with bibasilar ASD; Admit EKG noted possible AT; left posterior fascicular block; T wave abnormality, consider inferior ischemia. Second EKG NSR 70; IVCD; Admitting LABS: , K 4.9, BUN/Cr 113/9.8, Pro-BNP >70,000, ALT 15, AST 14, H/H 8.7/26.8 and Anders 231, 159 and 210 (292 on 1/2/24). Negative Covid/flu. Patient did meet criteria for DKA with a mild acidosis and AG and given IV insulin.  He was also given 80 mg IV lasix and admitted to ICU.   Diagnosis unspecified CP, HTN emergency, abnormal EKG finding, elevated Anders, and acute on chronic systolic CHF in middle-aged male with multiple medical problems.    Recs:  Main issue appears to be non-compliance with meds, doc visits, dialysis. Hard to correct.  Elevated Anders chronically and likely due to demand ischemia from hypoxia, HTN, and ESRD.  EKG finding ? Atach but repeat EKG NSR and no ischemic ST changes.  Dialyze and support O2 for now.  Continue eliquis 5 BID, plavix 75 qd, pravachol 40 qd, torsemide 100 qd.  No need for cardiac testing at this time. Await medical stabilization. Once stable I am not sure cardiac testing necessary.   He is not good candidate for cath and intervention if non-compliant and would not take anti-platelet agents. Also would hesitate to use GDMT for ischemic CM  (shortness of breath) on exertion    Steal syndrome of dialysis vascular access ([facility])    Chronic, continuous use of opioids    Chronic bronchitis ([facility])    Nasal congestion    Hypercholesteremia    Bradycardia    S/P TAVR (transcatheter aortic valve replacement)    Syncope and collapse    PAF (paroxysmal atrial fibrillation) ([facility])    Bilateral leg weakness    GBS (Guillain-[place] syndrome) ([facility])    Sinus pause    Weakness of both lower extremities    Sinus bradycardia    Ataxia    Peripheral vascular occlusive disease ([facility])    Cellulitis of right foot    Iliac artery occlusion, 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[facility])    Cellulitis and abscess of hand    Uncontrolled type 2 diabetes mellitus with hyperglycemia ([facility])    Acute encephalopathy    Acute hypoxemic respiratory failure ([facility])    Acute CVA (cerebrovascular accident) ([facility])    Speech problem    Urinary tract infection with hematuria    Respiratory failure with hypoxia ([facility])    Acute respiratory failure with hypercapnia ([facility])    Acute pulmonary edema ([facility])    Grade II diastolic dysfunction    Shock circulatory ([facility])    Smoker    Normocytic normochromic anemia    NSTEMI (non-ST elevated myocardial infarction) ([facility])    SIRS (systemic inflammatory response syndrome) ([facility])    Atrial flutter ([facility])    [place]-rectal abscess    Somnolence    Pulmonary edema with diastolic CHF, NYHA class 3 ([facility])    Respiratory failure ([facility])    Former smoker    Cardiomyopathy ([facility])    Morbid obesity with BMI of 40.0-44.9, adult ([facility])    Hypoglycemia    Altered mental status    Hypertensive emergency    Dyspnea and respiratory abnormalities    Acute respiratory failure with hypoxia and hypercapnia ([facility])    HFrEF (heart failure with reduced ejection fraction) ([facility])    Pericardial effusion    Hypervolemia    Pneumonia of left lower lobe due to infectious organism    Acute on chronic respiratory failure with hypercapnia ([facility])    Compression atelectasis    Type 2 myocardial infarction ([facility])    Acute respiratory failure with

## 2024-01-10 NOTE — PROGRESS NOTES
Update provided to wife Crystal. Electronically signed by Genevieve Hill RN on 1/10/2024 at 1:52 PM

## 2024-01-10 NOTE — PROGRESS NOTES
Blood pressure (!) 142/78, pulse 82, temperature 97 °F (36.1 °C), temperature source Temporal, resp. rate 24, height 1.753 m (5' 9\"), SpO2 100 %.      Shift assessment completed, see flow sheets.    Pt a/o x4. Pt has no c/o pain or discomfort. Pt on Bipap at 75% now, down from 100%. Pt states his breathing feels better but is afraid to try to come off of Bipap at all to be able to drink water with his Lokelma. NP notified.    Insulin gtt infusing at 8.13 u/hr  BS rechecked  and was 331. Insulin gtt titrated per protocol.     Bed in lowest position, brakes locked and alarm on. Call light within reach. Monitoring closely.       Electronically signed by Jasmin Zhu RN on 1/9/2024 at 10:04 PM

## 2024-01-10 NOTE — ACP (ADVANCE CARE PLANNING)
Advance Care Planning     General Advance Care Planning (ACP) Conversation    Date of Conversation: 1/9/2024  Conducted with: Patient with Decision Making Capacity    Healthcare Decision Maker:    Primary Decision Maker: Crystal AnnOA - Spouse - [phone]    Secondary Decision Maker: Sabrina Tripp - Brother/Sister - [phone]  Click here to complete Healthcare Decision Makers including selection of the Healthcare Decision Maker Relationship (ie \"Primary\").   Today we documented Decision Maker(s) consistent with ACP documents on file.    Content/Action Overview:  Has ACP document(s) on file - reflects the patient's care preferences  Reviewed DNR/DNI and patient elects Full Code (Attempt Resuscitation)    Pt has DNR CCA paperwork on file, pt states he is a full code.     Length of Voluntary ACP Conversation in minutes:  <16 minutes (Non-Billable)    Claudia Campbell

## 2024-01-10 NOTE — PROGRESS NOTES
Pulmonary Progress Note  CC: Acute on chronic resp failure    Subjective:  pt refused to come off of Bipap to take Lokelma. O2 improve overnight.      EXAM: BP (!) 86/48   Pulse 67   Temp 97.4 °F (36.3 °C) (Axillary)   Resp 20   Ht 1.753 m (5' 9\")   Wt 91.5 kg (201 lb 11.2 oz)   SpO2 100%   BMI 29.79 kg/m²  on Bipap  Constitutional:  No acute distress.   Eyes: PERRL. Conjunctivae anicteric.   ENT: Normal nose. Normal tongue.    Neck:  Trachea is midline.   Respiratory: No accessory muscle usage.  Decreased breath sounds. No wheezes. + rales. No Rhonchi.  Cardiovascular: Normal S1S2. No digit clubbing. No digit cyanosis. No LE edema.   Psychiatric: No anxiety or Agitation. Alert and Oriented to person, place and time.    Scheduled Meds:   insulin glargine  10 Units SubCUTAneous Nightly    insulin lispro  0-4 Units SubCUTAneous Q4H    insulin lispro  0-4 Units SubCUTAneous Once    apixaban  5 mg Oral BID    busPIRone  10 mg Oral TID    clopidogrel  75 mg Oral Daily    DULoxetine  60 mg Oral Daily    isosorbide dinitrate  20 mg Oral TID    metoprolol succinate  100 mg Oral BID    pravastatin  40 mg Oral QHS    torsemide  100 mg Oral Daily    QUEtiapine  50 mg Oral Nightly    sodium chloride flush  5-40 mL IntraVENous 2 times per day    dilTIAZem  180 mg Oral Daily    ipratropium 0.5 mg-albuterol 2.5 mg  1 Dose Inhalation 4x Daily RT    sodium zirconium cyclosilicate  10 g Oral Once     Continuous Infusions:   sodium bicarbonate 150 mEq in sterile water 1,000 mL infusion 100 mL/hr at 01/10/24 0558    sodium chloride      dextrose       PRN Meds:  sodium chloride flush, sodium chloride, ondansetron **OR** ondansetron, polyethylene glycol, acetaminophen **OR** acetaminophen, labetalol, glucose, dextrose bolus **OR** dextrose bolus, glucagon (rDNA), dextrose, dextrose bolus **OR** dextrose bolus, potassium chloride, magnesium sulfate, sodium phosphate 15 mmol in sodium chloride 0.9 % 250 mL IVPB,

## 2024-01-10 NOTE — PROGRESS NOTES
2 new PIVs placed by bakari Oglesby RN, with ultrasound.     . Insulin gtt restarted at 0.8 u/hr, and fluids restarted at 125 ml/hr.     Electronically signed by Jasmin Zhu RN on 1/10/2024 at 2:05 AM

## 2024-01-10 NOTE — PROGRESS NOTES
Pt keeps saying that he has to void but then is unable to.   Bladder scan showing 0 mls of urine.  Pt aware. He is now requesting something to help him sleep.   Pt assured that he has seroquel ordered and the night RN will be bringing it to him.

## 2024-01-10 NOTE — CONSULTS
Patient seen and examined during dialysis, consult note dictated.    Assessment and Plan:    1- ESRD: Hemodialysis ongoing per MWF schedule with target of 3.5 to 4 kgs of fluid removal.    2- Hyperkalemia: S/p medical treatment pending dialysis.    3- HTN: Blood pressure relatively low, we will hold his antihypertensive medications and will administer Midodrine along with IV Albumin.    4- Anemia: Stable hemoglobin, resume DAVON during dialysis.

## 2024-01-10 NOTE — PROGRESS NOTES
01/10/24 0300   NIV Type   $NIV $Daily Charge   NIV Started/Stopped On   Equipment Type v60   Mode Bilevel   Mask Type Full face mask   Mask Size Large   Assessment   Pulse 68   SpO2 100 %   Settings/Measurements   IPAP 12 cmH20   CPAP/EPAP 6 cmH2O   Vt (Measured) 586 mL   Rate Ordered 16   FiO2  (S)  60 %  (decreased to 45%)   I Time/ I Time % 1 s   Minute Volume (L/min) 9 Liters   Mask Leak (lpm) 8 lpm   Patient's Home Machine No

## 2024-01-10 NOTE — PLAN OF CARE
Problem: Discharge Planning  Goal: Discharge to home or other facility with appropriate resources  Outcome: Progressing  Flowsheets (Taken 1/9/2024 2123)  Discharge to home or other facility with appropriate resources: Identify barriers to discharge with patient and caregiver     Problem: Pain  Goal: Verbalizes/displays adequate comfort level or baseline comfort level  Outcome: Progressing     Problem: Safety - Adult  Goal: Free from fall injury  Outcome: Progressing

## 2024-01-10 NOTE — PROGRESS NOTES
Pt still refusing to come off of bipap to take Lokelma. Dr. Geller notified. K 5.6    Electronically signed by Jasmin Zhu RN on 1/10/2024 at 4:52 AM

## 2024-01-10 NOTE — PROGRESS NOTES
01/09/24 1919   NIV Type   NIV Started/Stopped On   Equipment Type v60   Mode Bilevel   Mask Type Full face mask   Mask Size Large   Assessment   Pulse 82   SpO2 100 %   Settings/Measurements   IPAP 12 cmH20   CPAP/EPAP 6 cmH2O   Vt (Measured) 364 mL   Rate Ordered 16   FiO2  (S)  100 %  (decreased to 75%)   I Time/ I Time % 1 s   Minute Volume (L/min) 10.8 Liters   Mask Leak (lpm) 7 lpm   Patient's Home Machine No   Alarm Settings   Alarms On Y   Low Pressure (cmH2O) 8 cmH2O   High Pressure (cmH2O) 30 cmH2O   Delay Alarm 20 sec(s)   RR Low (bpm) 16   RR High (bpm) 40 br/min

## 2024-01-10 NOTE — PROGRESS NOTES
Pt yelling out that he can not breathe.   Sats are 100% on BiPap.   RT at bedside and made slight changes in inspiratory rate on BiPap, but pt states that he still is SOB.   Explained to pt that everything is good with the bipap and his 02 level is ok.   Pt says that his anxiety is just very high.   Pt has buspar ordered but says that he takes percocet for anxiety at home.      Message sent to Dr. FREED to see if there is anything else we can do for pts anxiety.

## 2024-01-10 NOTE — PROGRESS NOTES
Dr. Blackburn called and stated pt will have HD tomorrow am since pt's oxygenation improving. Pt now 75% on Bipap down from 100% and pts Spo2 is 100%.     Electronically signed by Jasmin Zhu RN on 1/9/2024 at 8:24 PM

## 2024-01-10 NOTE — PROGRESS NOTES
Dr. Geller placed order for Sod bicarb gtt. Waiting for pharmacy to send up.     Electronically signed by Jasmin Zhu RN on 1/10/2024 at 5:24 AM

## 2024-01-10 NOTE — PROGRESS NOTES
4 Eyes Skin Assessment     NAME:  Igor Ann  YOB: 1970  MEDICAL RECORD NUMBER:  [id number]    The patient is being assessed for  Other bed rest     I agree that at least one RN has performed a thorough Head to Toe Skin Assessment on the patient. ALL assessment sites listed below have been assessed.      Areas assessed by both nurses:    Head, Face, Ears, Shoulders, Back, Chest, Arms, Elbows, Hands, Sacrum. Buttock, Coccyx, Ischium, Legs. Feet and Heels, and Under Medical Devices         Does the Patient have a Wound? Yes wound(s) were present on assessment. LDA wound assessment was Initiated and completed by RN                  Isaac Prevention initiated by RN: Yes  Wound Care Orders initiated by RN: Yes    Pressure Injury (Stage 3,4, Unstageable, DTI, NWPT, and Complex wounds) if present, place Wound referral order by RN under : No    New Ostomies, if present place, Ostomy referral order under : No     Nurse 1 eSignature: Electronically signed by Jasmin Zhu RN on 1/10/24 at 5:11 AM EST    **SHARE this note so that the co-signing nurse can place an eSignature**    Nurse 2 eSignature: Electronically signed by Garima Lezama RN on 1/10/24 at 6:27 AM EST

## 2024-01-10 NOTE — CARE COORDINATION
Case Management Assessment  Initial Evaluation    Date/Time of Evaluation: 1/10/2024 9:06 AM  Assessment Completed by: Claudia Campbell    If patient is discharged prior to next notation, then this note serves as note for discharge by case management.    Patient Name: Igor Ann                   YOB: 1970  Diagnosis: Shortness of breath [R06.02]  Acute respiratory failure with hypoxia (HCC) [J96.01]  Acute on chronic respiratory failure (HCC) [J96.20]  Diabetic ketoacidosis without coma associated with diabetes mellitus due to underlying condition (HCC) [E08.10]                   Date / Time: 1/9/2024 11:33 AM    Patient Admission Status: Inpatient   Readmission Risk (Low < 19, Mod (19-27), High > 27): Readmission Risk Score: 51    Current PCP: No primary care provider on file.  PCP verified by CM? Yes (no PCP)    Chart Reviewed: Yes      History Provided by: Patient  Patient Orientation: Alert and Oriented    Patient Cognition: Alert    Hospitalization in the last 30 days (Readmission):  Yes    If yes, Readmission Assessment in CM Navigator will be completed.    Advance Directives:      Code Status: Full Code   Patient's Primary Decision Maker is: Named in Scanned ACP Document    Primary Decision Maker: Crystal Ann [facility] - Spouse - [phone]    Secondary Decision Maker: Sabrina Tripp - Brother/Sister - [phone]    Discharge Planning:    Patient lives with: Spouse/Significant Other Type of Home: Trailer/Mobile Home  Primary Care Giver: Self  Patient Support Systems include: Spouse/Significant Other   Current Financial resources: Medicaid, Medicare  Current community resources: None  Current services prior to admission: Oxygen Therapy, Other (Comment) (Dee Jeronimo, oxygen provder unknown)            Current DME:              Type of Home Care services:  None    ADLS  Prior functional level: Independent in ADLs/IADLs  Current functional level: Independent in ADLs/IADLs    PT AM-PAC:

## 2024-01-10 NOTE — PROGRESS NOTES
Treatment time: 3 hours  Net UF: 4000 ml     Pre weight: 91.5 kg  Post weight:87.5 kg      Access used: CVC    Access function: good with  ml/min     Medications or blood products given: Retacrit [id number]     Regular outpatient schedule: MWF     Summary of response to treatment: Patient tolerated treatment well and without any complications. Patient remained stable throughout entire treatment. Copy of dialysis treatment record placed in chart, to be scanned into EMR.

## 2024-01-10 NOTE — PROGRESS NOTES
D5%  .45%  and insulin gtt paused  d/t IV infiltrate. RAC PIV removed, rt arm elevated and warm compress applied. Attempting to find IV access.     Electronically signed by Jasmin Zhu RN on 1/10/2024 at 12:52 AM

## 2024-01-10 NOTE — PROGRESS NOTES
Spoke with patients wife. She is unaware of home medication regimen and is unable to assist with admission. Electronically signed by Genevieve Hill RN on 1/9/2024 at 7:32 PM

## 2024-01-10 NOTE — PROGRESS NOTES
Reny, Pharmacy called and informed this RN that there are orders placed to stop DKA protocol, and to give pt 10 u of Lantus, 1300 mg sodium bicarb PO and 500 ml sod chloride bolus. Sliding scale Q4 ordered, pt did not require with 140 BS.       Pt's had a couple of low BP readings with systolics in 70s and 80s. Bp taken on legs now d/t LUE limb restriction and RUE PIVs (x3) with previous infiltrate. Dr. Geller notified. Dr. Geller wanted to place central line, however pt refusing. Verbal order for stat labs, BMP, lactic and VBG placed.     Electronically signed by Jasmin Zhu RN on 1/10/2024 at 3:42 AM

## 2024-01-10 NOTE — CONSULTS
[address] [address]                                  CONSULTATION    PATIENT NAME: ALLEN HARDWICK                  :        1970  MED REC NO:   [id number]                          ROOM:  ACCOUNT NO:   [id number]                           ADMIT DATE: 2024  PROVIDER:     Lidia Marcial MD    CONSULT DATE:  01/10/2024    REASON FOR CONSULTATION:  End-stage renal disease management.    HISTORY OF PRESENT ILLNESS:  The patient is a 53-year-old  male  patient with a past medical history significant for end-stage renal  disease on hemodialysis every Monday, Wednesday and Friday.  The patient  has not been compliant with his dialysis treatment nor with his fluid  restriction at home.  He had repeated admissions with a similar  complaint of shortness of breath and volume overload.  The patient was  admitted.  Nephrology was consulted for further management of his  dialysis needs.    PAST MEDICAL HISTORY:  1.  End-stage renal disease.  2.  Diastolic heart failure.  3.  COPD.  4.  Diabetes mellitus.  5.  Emphysema.  6.  Hypertension.  7.  Hyperlipidemia.  8.  Transient ischemic attack.    PAST SURGICAL HISTORY:  1.  Aortic valve replacement.  2.  Tunneled dialysis catheter placement.  3.  AV fistula creation.  4.  Thoracentesis.  5.  Tonsillectomy.  6.  EGD.    ALLERGIES:  The patient allergic to MORPHINE.    SOCIAL HISTORY:  The patient quit smoking a few years ago and does not  drink alcohol.    FAMILY HISTORY:  Significant for coronary artery disease in his father  and diabetes mellitus in his mother.    REVIEW OF SYSTEMS:  The patient denied any nausea, vomiting or abdominal  pain.  Otherwise, a 10-point review of system was relatively  unremarkable.    PHYSICAL EXAMINATION:  VITAL SIGNS:  Blood pressure 106/56, heart rate 78, respirations 20,  temperature 97.1 Fahrenheit.  The patient satting 100% on 35%

## 2024-01-10 NOTE — PROGRESS NOTES
Pt now resting comfortably on BiPap with 0.2 mcgs/kg/hour of precedex infusing.  Evening assessment complete.   Pt denies needs at this time.

## 2024-01-10 NOTE — PROGRESS NOTES
Pts Last K 5.6. Pt NPO and currently still on Bipap at 75%. States he is afraid to come off long enough to take Lokelma. Np notified via [employer]. Awaiting reply.     Electronically signed by Jasmin Zhu RN on 1/9/2024 at 9:02 PM

## 2024-01-10 NOTE — PROGRESS NOTES
Admit: 2024    Name:  Igor Ann  Room:  [address] [place] [address]  MRN:    [id number]    Critical Care Daily Progress Note for 1/10/2024   Admitted with acute on chronic resp failure and fluid overload    Interval History:   Transferred to ICU for increased work of breathing  Placed on bipap  Feels better   Scheduled Meds:   insulin glargine  10 Units SubCUTAneous Nightly    insulin lispro  0-4 Units SubCUTAneous Q4H    insulin lispro  0-4 Units SubCUTAneous Once    apixaban  5 mg Oral BID    busPIRone  10 mg Oral TID    clopidogrel  75 mg Oral Daily    DULoxetine  60 mg Oral Daily    [Held by provider] isosorbide dinitrate  20 mg Oral TID    [Held by provider] metoprolol succinate  100 mg Oral BID    pravastatin  40 mg Oral QHS    torsemide  100 mg Oral Daily    QUEtiapine  50 mg Oral Nightly    sodium chloride flush  5-40 mL IntraVENous 2 times per day    [Held by provider] dilTIAZem  180 mg Oral Daily    ipratropium 0.5 mg-albuterol 2.5 mg  1 Dose Inhalation 4x Daily RT    sodium zirconium cyclosilicate  10 g Oral Once       Continuous Infusions:   sodium bicarbonate 150 mEq in sterile water 1,000 mL infusion 100 mL/hr at 01/10/24 0558    sodium chloride      dextrose         PRN Meds:  sodium chloride flush, sodium chloride, ondansetron **OR** ondansetron, polyethylene glycol, acetaminophen **OR** acetaminophen, labetalol, glucose, dextrose bolus **OR** dextrose bolus, glucagon (rDNA), dextrose, dextrose bolus **OR** dextrose bolus, potassium chloride, magnesium sulfate, sodium phosphate 15 mmol in sodium chloride 0.9 % 250 mL IVPB, hydrALAZINE                  Objective:     Temp  Av.5 °F (36.4 °C)  Min: 97 °F (36.1 °C)  Max: 98 °F (36.7 °C)  Pulse  Av.9  Min: 66  Max: 104  BP  Min: 66/56  Max: 222/210  SpO2  Av.8 %  Min: 98 %  Max: 100 %  FiO2   Av.5 %  Min: 45 %  Max: 100 %  Patient Vitals for the past 4 hrs:   BP Temp Temp src Pulse SpO2 Weight   01/10/24 0740 -- -- -- -- 99 % --   01/10/24

## 2024-01-11 PROBLEM — I50.23 ACUTE ON CHRONIC SYSTOLIC CHF (CONGESTIVE HEART FAILURE) (HCC): Status: ACTIVE | Noted: 2024-01-03

## 2024-01-11 LAB
ANION GAP SERPL CALCULATED.3IONS-SCNC: 14 MMOL/L (ref 3–16)
BASOPHILS # BLD: 0 K/UL (ref 0–0.2)
BASOPHILS NFR BLD: 0 %
BUN SERPL-MCNC: 68 MG/DL (ref 7–20)
CALCIUM SERPL-MCNC: 9.2 MG/DL (ref 8.3–10.6)
CHLORIDE SERPL-SCNC: 96 MMOL/L (ref 99–110)
CO2 SERPL-SCNC: 25 MMOL/L (ref 21–32)
CREAT SERPL-MCNC: 6.2 MG/DL (ref 0.9–1.3)
DEPRECATED RDW RBC AUTO: 20.8 % (ref 12.4–15.4)
EOSINOPHIL # BLD: 0 K/UL (ref 0–0.6)
EOSINOPHIL NFR BLD: 0 %
EST. AVERAGE GLUCOSE BLD GHB EST-MCNC: 151.3 MG/DL
GFR SERPLBLD CREATININE-BSD FMLA CKD-EPI: 10 ML/MIN/{1.73_M2}
GLUCOSE BLD-MCNC: 154 MG/DL (ref 70–99)
GLUCOSE BLD-MCNC: 170 MG/DL (ref 70–99)
GLUCOSE BLD-MCNC: 201 MG/DL (ref 70–99)
GLUCOSE BLD-MCNC: 284 MG/DL (ref 70–99)
GLUCOSE SERPL-MCNC: 120 MG/DL (ref 70–99)
HBA1C MFR BLD: 6.9 %
HCT VFR BLD AUTO: 30.4 % (ref 40.5–52.5)
HGB BLD-MCNC: 9.8 G/DL (ref 13.5–17.5)
LYMPHOCYTES # BLD: 0.4 K/UL (ref 1–5.1)
LYMPHOCYTES NFR BLD: 5 %
MCH RBC QN AUTO: 30.6 PG (ref 26–34)
MCHC RBC AUTO-ENTMCNC: 32.3 G/DL (ref 31–36)
MCV RBC AUTO: 94.6 FL (ref 80–100)
MONOCYTES # BLD: 0.5 K/UL (ref 0–1.3)
MONOCYTES NFR BLD: 6 %
NEUTROPHILS # BLD: 7.7 K/UL (ref 1.7–7.7)
NEUTROPHILS NFR BLD: 84 %
NEUTS BAND NFR BLD MANUAL: 5 % (ref 0–7)
PERFORMED ON: ABNORMAL
PLATELET # BLD AUTO: 166 K/UL (ref 135–450)
PLATELET BLD QL SMEAR: ADEQUATE
PMV BLD AUTO: 8.5 FL (ref 5–10.5)
POTASSIUM SERPL-SCNC: 4.9 MMOL/L (ref 3.5–5.1)
RBC # BLD AUTO: 3.22 M/UL (ref 4.2–5.9)
SLIDE REVIEW: ABNORMAL
SODIUM SERPL-SCNC: 135 MMOL/L (ref 136–145)
WBC # BLD AUTO: 8.7 K/UL (ref 4–11)

## 2024-01-11 PROCEDURE — 2000000000 HC ICU R&B

## 2024-01-11 PROCEDURE — 99291 CRITICAL CARE FIRST HOUR: CPT | Performed by: INTERNAL MEDICINE

## 2024-01-11 PROCEDURE — 2580000003 HC RX 258

## 2024-01-11 PROCEDURE — 94660 CPAP INITIATION&MGMT: CPT

## 2024-01-11 PROCEDURE — 85025 COMPLETE CBC W/AUTO DIFF WBC: CPT

## 2024-01-11 PROCEDURE — 6370000000 HC RX 637 (ALT 250 FOR IP)

## 2024-01-11 PROCEDURE — 6370000000 HC RX 637 (ALT 250 FOR IP): Performed by: INTERNAL MEDICINE

## 2024-01-11 PROCEDURE — 80048 BASIC METABOLIC PNL TOTAL CA: CPT

## 2024-01-11 PROCEDURE — 99233 SBSQ HOSP IP/OBS HIGH 50: CPT | Performed by: INTERNAL MEDICINE

## 2024-01-11 PROCEDURE — 2700000000 HC OXYGEN THERAPY PER DAY

## 2024-01-11 PROCEDURE — 83036 HEMOGLOBIN GLYCOSYLATED A1C: CPT

## 2024-01-11 PROCEDURE — 94640 AIRWAY INHALATION TREATMENT: CPT

## 2024-01-11 PROCEDURE — 2500000003 HC RX 250 WO HCPCS: Performed by: INTERNAL MEDICINE

## 2024-01-11 PROCEDURE — 94761 N-INVAS EAR/PLS OXIMETRY MLT: CPT

## 2024-01-11 PROCEDURE — 36415 COLL VENOUS BLD VENIPUNCTURE: CPT

## 2024-01-11 RX ORDER — IPRATROPIUM BROMIDE AND ALBUTEROL SULFATE 2.5; .5 MG/3ML; MG/3ML
1 SOLUTION RESPIRATORY (INHALATION) 3 TIMES DAILY
Status: DISCONTINUED | OUTPATIENT
Start: 2024-01-11 | End: 2024-01-13

## 2024-01-11 RX ORDER — METOPROLOL SUCCINATE 25 MG/1
25 TABLET, EXTENDED RELEASE ORAL DAILY
Status: DISCONTINUED | OUTPATIENT
Start: 2024-01-11 | End: 2024-01-15 | Stop reason: HOSPADM

## 2024-01-11 RX ORDER — TRAZODONE HYDROCHLORIDE 50 MG/1
50 TABLET ORAL NIGHTLY
Status: DISCONTINUED | OUTPATIENT
Start: 2024-01-11 | End: 2024-01-15 | Stop reason: HOSPADM

## 2024-01-11 RX ADMIN — CLOPIDOGREL BISULFATE 75 MG: 75 TABLET ORAL at 10:25

## 2024-01-11 RX ADMIN — INSULIN GLARGINE 10 UNITS: 100 INJECTION, SOLUTION SUBCUTANEOUS at 20:52

## 2024-01-11 RX ADMIN — APIXABAN 5 MG: 5 TABLET, FILM COATED ORAL at 10:25

## 2024-01-11 RX ADMIN — APIXABAN 5 MG: 5 TABLET, FILM COATED ORAL at 20:53

## 2024-01-11 RX ADMIN — QUETIAPINE FUMARATE 50 MG: 25 TABLET ORAL at 20:53

## 2024-01-11 RX ADMIN — BUSPIRONE HYDROCHLORIDE 10 MG: 10 TABLET ORAL at 10:25

## 2024-01-11 RX ADMIN — MUPIROCIN: 20 OINTMENT TOPICAL at 10:26

## 2024-01-11 RX ADMIN — SACUBITRIL AND VALSARTAN 0.5 TABLET: 24; 26 TABLET, FILM COATED ORAL at 13:35

## 2024-01-11 RX ADMIN — BUSPIRONE HYDROCHLORIDE 10 MG: 10 TABLET ORAL at 20:53

## 2024-01-11 RX ADMIN — INSULIN LISPRO 1 UNITS: 100 INJECTION, SOLUTION INTRAVENOUS; SUBCUTANEOUS at 16:21

## 2024-01-11 RX ADMIN — BUSPIRONE HYDROCHLORIDE 10 MG: 10 TABLET ORAL at 13:36

## 2024-01-11 RX ADMIN — Medication 10 ML: at 20:53

## 2024-01-11 RX ADMIN — SACUBITRIL AND VALSARTAN 0.5 TABLET: 24; 26 TABLET, FILM COATED ORAL at 20:53

## 2024-01-11 RX ADMIN — TORSEMIDE 100 MG: 100 TABLET ORAL at 10:25

## 2024-01-11 RX ADMIN — IPRATROPIUM BROMIDE AND ALBUTEROL SULFATE 1 DOSE: 2.5; .5 SOLUTION RESPIRATORY (INHALATION) at 06:50

## 2024-01-11 RX ADMIN — METOPROLOL SUCCINATE 25 MG: 25 TABLET, EXTENDED RELEASE ORAL at 13:35

## 2024-01-11 RX ADMIN — DULOXETINE HYDROCHLORIDE 60 MG: 60 CAPSULE, DELAYED RELEASE ORAL at 10:25

## 2024-01-11 RX ADMIN — PRAVASTATIN SODIUM 40 MG: 40 TABLET ORAL at 20:53

## 2024-01-11 RX ADMIN — IPRATROPIUM BROMIDE AND ALBUTEROL SULFATE 1 DOSE: 2.5; .5 SOLUTION RESPIRATORY (INHALATION) at 10:10

## 2024-01-11 RX ADMIN — IPRATROPIUM BROMIDE AND ALBUTEROL SULFATE 1 DOSE: 2.5; .5 SOLUTION RESPIRATORY (INHALATION) at 19:29

## 2024-01-11 RX ADMIN — Medication 10 ML: at 10:26

## 2024-01-11 RX ADMIN — INSULIN LISPRO 2 UNITS: 100 INJECTION, SOLUTION INTRAVENOUS; SUBCUTANEOUS at 20:52

## 2024-01-11 RX ADMIN — MUPIROCIN: 20 OINTMENT TOPICAL at 20:57

## 2024-01-11 RX ADMIN — TRAZODONE HYDROCHLORIDE 50 MG: 50 TABLET ORAL at 20:53

## 2024-01-11 RX ADMIN — Medication 0.3 MCG/KG/HR: at 05:37

## 2024-01-11 ASSESSMENT — PAIN SCALES - GENERAL
PAINLEVEL_OUTOF10: 0
PAINLEVEL_OUTOF10: 0

## 2024-01-11 NOTE — PROGRESS NOTES
01/11/24 1648   Encounter Summary   Encounter Overview/Reason  Advance Care Planning   Service Provided For: Patient;Family   Referral/Consult From: Nurse   Support System Children;Family members   Last Encounter  01/11/24  ( assisted patient in updated HC Decision Makers)   Advance Care Planning   Type ACP conversation;Completed AD/ACP document(s)

## 2024-01-11 NOTE — PLAN OF CARE
Problem: Pain  Goal: Verbalizes/displays adequate comfort level or baseline comfort level  Outcome: Progressing  Flowsheets (Taken 1/10/2024 2227)  Verbalizes/displays adequate comfort level or baseline comfort level: Encourage patient to monitor pain and request assistance  Note: Pain rated 0/10.      Problem: Respiratory - Adult  Goal: Achieves optimal ventilation and oxygenation  Outcome: Progressing  Flowsheets (Taken 1/10/2024 2228)  Achieves optimal ventilation and oxygenation:   Assess for changes in respiratory status   Assess for changes in mentation and behavior   Position to facilitate oxygenation and minimize respiratory effort   Assess and instruct to report shortness of breath or any respiratory difficulty   Assess the need for suctioning and aspirate as needed  Note: Patient at this time is only able to take short breaks off of BiPAP for medications and sips of water.      Problem: Cardiovascular - Adult  Goal: Maintains optimal cardiac output and hemodynamic stability  Outcome: Progressing  Flowsheets (Taken 1/10/2024 2228)  Maintains optimal cardiac output and hemodynamic stability:   Monitor blood pressure and heart rate   Monitor urine output and notify Licensed Independent Practitioner for values outside of normal range   Assess for signs of decreased cardiac output  Goal: Absence of cardiac dysrhythmias or at baseline  Outcome: Progressing     Problem: Musculoskeletal - Adult  Goal: Return mobility to safest level of function  Outcome: Progressing     Problem: Gastrointestinal - Adult  Goal: Minimal or absence of nausea and vomiting  Outcome: Progressing     Problem: Genitourinary - Adult  Goal: Absence of urinary retention  Outcome: Progressing

## 2024-01-11 NOTE — PROGRESS NOTES
Pt wants twin sister to be POA. Sister is at bedside.  notified and he will complete the paperwork soon.

## 2024-01-11 NOTE — PROGRESS NOTES
Department of Internal Medicine  Nephrology Progress Note        SUBJECTIVE:    We are following this patient for ESRD management  The patient was seen and examined; he feels well today with no CP, SOB, nausea or vomiting.    ROS: No fever or chills.  Social: No family at bedside.      Physical Exam:    VITALS:  BP (!) 136/92   Pulse 72   Temp 98 °F (36.7 °C) (Temporal)   Resp 22   Ht 1.753 m (5' 9\")   Wt 90.1 kg (198 lb 11.2 oz)   SpO2 94%   BMI 29.34 kg/m²     General appearance: Seems comfortable, no acute distress.  Neck: Trachea midline, thyroid normal.   Lungs:  Non labored breathing, CTA to anterior auscultation.  Heart:  S1S2 normal, rub or gallop. No peripheral edema.  Abdomen: Soft, non-tender, no organomegaly.   Skin: No lesions or rashes, warm to touch.     DATA:    CBC with Differential:    Lab Results   Component Value Date/Time    WBC 8.7 01/11/2024 04:22 AM    RBC 3.22 01/11/2024 04:22 AM    HGB 9.8 01/11/2024 04:22 AM    HCT 30.4 01/11/2024 04:22 AM     01/11/2024 04:22 AM    MCV 94.6 01/11/2024 04:22 AM    MCH 30.6 01/11/2024 04:22 AM    MCHC 32.3 01/11/2024 04:22 AM    RDW 20.8 01/11/2024 04:22 AM    SEGSPCT 73.4 05/17/2013 06:50 AM    BANDSPCT 5 01/11/2024 04:22 AM    METASPCT 2 03/03/2022 07:41 AM    LYMPHOPCT 5.0 01/11/2024 04:22 AM    MONOPCT 6.0 01/11/2024 04:22 AM    MYELOPCT 2 03/16/2023 04:45 AM    BASOPCT 0.0 01/11/2024 04:22 AM    MONOSABS 0.5 01/11/2024 04:22 AM    LYMPHSABS 0.4 01/11/2024 04:22 AM    EOSABS 0.0 01/11/2024 04:22 AM    BASOSABS 0.0 01/11/2024 04:22 AM    DIFFTYPE Auto 05/17/2013 06:50 AM     BMP:    Lab Results   Component Value Date/Time     01/11/2024 04:22 AM    K 4.9 01/11/2024 04:22 AM    CL 96 01/11/2024 04:22 AM    CO2 25 01/11/2024 04:22 AM    BUN 68 01/11/2024 04:22 AM    LABALBU 4.1 01/09/2024 07:41 PM    CREATININE 6.2 01/11/2024 04:22 AM    CALCIUM 9.2 01/11/2024 04:22 AM    GFRAA 10 10/05/2022 11:53 AM    GFRAA >60 05/17/2013 06:50 AM

## 2024-01-11 NOTE — ACP (ADVANCE CARE PLANNING)
Advance Care Planning     Advance Care Planning Inpatient Note  [facility] Department    Today's Date: 1/11/2024  Unit: [facility] ICU    Received request from IDT Member.  Upon review of chart and communication with care team, patient's decision making abilities are not in question.. Patient and Healthcare Decision Maker was/were present in the room during visit.    Goals of ACP Conversation:  Discuss advance care planning documents  Facilitate a discussion related to patient's goals of care as they align with the patient's values and beliefs.    Health Care Decision Makers:       Primary Decision Maker: Joya Land - Step Child - [phone]    Secondary Decision Maker: Sridevi Salmeron - Brother/Sister - [phone]    Supplemental (Other) Decision Maker: Brianna Reyes - Step Child - [phone]  Summary:  Completed New Documents  Verified Healthcare Decision Maker    Advance Care Planning Documents (Patient Wishes):  Healthcare Power of /Advance Directive Appointment of Health Care Agent  Living Will/Advance Directive     Assessment:  Completed documents IAW patient directives    Interventions:  Requested patient/family to submit existing document for our records: Healthcare Power of /Advance Directive Appointment of Health Care Agent  Living Will/Advance Directive    Care Preferences Communicated:   No    Outcomes/Plan:  ACP Discussion: Completed    Electronically signed by STEPHANIE Ledesma on 1/11/2024 at 4:46 PM

## 2024-01-11 NOTE — PROGRESS NOTES
01/11/24 0332   NIV Type   $NIV $Daily Charge   NIV Started/Stopped On   Equipment Type v60   Mode Bilevel   Mask Type Full face mask   Mask Size Large   Assessment   Pulse 67   SpO2 (!) 87 %   Settings/Measurements   IPAP 14 cmH20   CPAP/EPAP 6 cmH2O   Vt (Measured) 681 mL   Rate Ordered 16   FiO2  30 %   I Time/ I Time % 0.9 s   Minute Volume (L/min) 10.6 Liters   Mask Leak (lpm) 6 lpm   Patient's Home Machine No

## 2024-01-11 NOTE — PROGRESS NOTES
,HEART FAILURE CARE PLAN:    Comorbidities Reviewed: Yes   Patient has a past medical history of Ambulatory dysfunction, Aortic stenosis, Arthritis, Asthma, Bilateral hilar adenopathy syndrome, CAD (coronary artery disease), Cardiomyopathy ([facility]), CHF (congestive heart failure) ([facility]), Chronic pain, COPD (chronic obstructive pulmonary disease) ([facility]), CVA (cerebral vascular accident) ([facility]), Depression, Diabetes mellitus ([facility]), Difficult intravenous access, Emphysema of lung ([facility]), ESRD (end stage renal disease) on dialysis ([facility]), Fear of needles, Gastric ulcer, GERD (gastroesophageal reflux disease), Heart valve problem, Hemodialysis patient ([facility]), History of spinal fracture, Hx of blood clots, Hyperlipidemia, Hypertension, MI (myocardial infarction) ([facility]), Neuromuscular disorder ([facility]), Numbness and tingling in left arm, Pneumonia, PONV (postoperative nausea and vomiting), Prolonged emergence from general anesthesia, Sleep apnea, Stroke ([facility]), TIA (transient ischemic attack), and Unspecified diseases of blood and blood-forming organs.     ECHOCARDIOGRAM Reviewed: Yes   Patient's Ejection Fraction (EF) is less than 40%    Weights Reviewed: Yes   Admission weight: 91.5 kg (201 lb 11.2 oz)   Wt Readings from Last 3 Encounters:   01/11/24 90.1 kg (198 lb 11.2 oz)   01/05/24 91.1 kg (200 lb 13.4 oz)   12/29/23 95.3 kg (210 lb)     Intake & Output Reviewed: Yes     Intake/Output Summary (Last 24 hours) at 1/11/2024 1159  Last data filed at 1/11/2024 1035  Gross per 24 hour   Intake 314.9 ml   Output 0 ml   Net 314.9 ml     Medications Reviewed: Yes   SCHEDULED HOSPITAL MEDICATIONS:   traZODone  50 mg Oral Nightly    ipratropium 0.5 mg-albuterol 2.5 mg  1 Dose Inhalation TID    metoprolol succinate  25 mg Oral Daily    sacubitril-valsartan  0.5 tablet Oral BID    insulin glargine  10 Units SubCUTAneous Nightly    insulin lispro  0-4 Units SubCUTAneous Q4H    insulin lispro  0-4 Units SubCUTAneous Once    mupirocin   Each  Nostril BID    epoetin siddharth-epbx  10,000 Units IntraVENous Once per day on Mon Wed Fri    apixaban  5 mg Oral BID    busPIRone  10 mg Oral TID    clopidogrel  75 mg Oral Daily    DULoxetine  60 mg Oral Daily    pravastatin  40 mg Oral QHS    torsemide  100 mg Oral Daily    QUEtiapine  50 mg Oral Nightly    sodium chloride flush  5-40 mL IntraVENous 2 times per day     ACE/ARB/ARNI is REQUIRED for EF </= 40% SYSTOLIC FAILURE:   ACE:: None  ARB:: None  ARNI:: Sacubitril/Valsartan-Entresto    Evidenced-Based Beta Blocker is REQUIRED for EF </= 40% SYSTOLIC FAILURE:   :: Metoprolol SUCCinate- Toprol XL    Diuretics:  :: Torsemide    Diet Reviewed: Yes   ADULT DIET; Regular; 4 carb choices (60 gm/meal); Low Potassium (Less than 3000 mg/day); Low Phosphorus (Less than 1000 mg); 1000 ml    Goal of Care Reviewed: Yes   Patient and/or Family's stated Goal of Care this Admission: reduce shortness of breath, increase activity tolerance, better understand heart failure and disease management, and be more comfortable prior to discharge.

## 2024-01-11 NOTE — PROGRESS NOTES
01/10/24 1905   NIV Type   NIV Started/Stopped On   Equipment Type v60   Mode Bilevel   Mask Type Full face mask   Mask Size Large   Assessment   Pulse 79   SpO2 98 %   Settings/Measurements   IPAP 14 cmH20   CPAP/EPAP 6 cmH2O   Vt (Measured) 425 mL   Rate Ordered 16   FiO2  30 %   I Time/ I Time % 0.9 s   Minute Volume (L/min) 8 Liters   Mask Leak (lpm) 17 lpm   Patient's Home Machine No   Alarm Settings   Alarms On Y   Low Pressure (cmH2O) 8 cmH2O   High Pressure (cmH2O) 30 cmH2O   Delay Alarm 20 sec(s)   RR Low (bpm) 16   RR High (bpm) 40 br/min

## 2024-01-11 NOTE — PROGRESS NOTES
Admit: 2024    Name:  Igor Ann  Room:  [address] [place] [address]  MRN:    [id number]    Critical Care Daily Progress Note for 2024   Admitted with acute on chronic resp failure and fluid overload    Interval History:   Transferred to ICU for increased work of breathing  Placed on bipap  Feels better        Placed on precedex to tolerate BiPAP  He refuses to come off bipap  Scheduled Meds:   insulin glargine  10 Units SubCUTAneous Nightly    insulin lispro  0-4 Units SubCUTAneous Q4H    insulin lispro  0-4 Units SubCUTAneous Once    mupirocin   Each Nostril BID    epoetin siddharth-epbx  10,000 Units IntraVENous Once per day on     apixaban  5 mg Oral BID    busPIRone  10 mg Oral TID    clopidogrel  75 mg Oral Daily    DULoxetine  60 mg Oral Daily    pravastatin  40 mg Oral QHS    torsemide  100 mg Oral Daily    QUEtiapine  50 mg Oral Nightly    sodium chloride flush  5-40 mL IntraVENous 2 times per day    ipratropium 0.5 mg-albuterol 2.5 mg  1 Dose Inhalation 4x Daily RT       Continuous Infusions:   dexmedeTOMIDine HCl in NaCl 0.3 mcg/kg/hr (24 0537)    sodium chloride      dextrose         PRN Meds:  ipratropium 0.5 mg-albuterol 2.5 mg, heparin (porcine), sodium chloride flush, sodium chloride, ondansetron **OR** ondansetron, polyethylene glycol, acetaminophen **OR** acetaminophen, labetalol, glucose, dextrose bolus **OR** dextrose bolus, glucagon (rDNA), dextrose, dextrose bolus **OR** dextrose bolus, hydrALAZINE                  Objective:     Temp  Av.4 °F (36.3 °C)  Min: 96.8 °F (36 °C)  Max: 97.9 °F (36.6 °C)  Pulse  Av.2  Min: 66  Max: 80  BP  Min: 70/54  Max: 221/190  SpO2  Av.1 %  Min: 87 %  Max: 100 %  FiO2   Av.6 %  Min: 30 %  Max: 45 %  Patient Vitals for the past 4 hrs:   BP Pulse SpO2 Weight   24 0700 105/71 68 97 % --   24 0542 -- -- -- 90.1 kg (198 lb 11.2 oz)           Intake/Output Summary (Last 24 hours) at 2024 0736  Last data filed at  01/03/2024    Anxiety 01/03/2024    High anion gap metabolic acidosis 12/08/2023    Bimalleolar fracture of right ankle, closed, initial encounter 11/01/2023    Sepsis ([facility]) 09/27/2023    Volume overload 09/15/2023    Acute respiratory failure with hypoxia ([facility]) 08/28/2023    ESRD (end stage renal disease) ([facility]) 07/05/2023    Wound infection 05/27/2023    Gangrene of right foot ([facility]) 05/25/2023    Symptomatic bradycardia 05/24/2023    Pulmonary HTN ([facility]) 05/24/2023    Aortic valve disease 05/24/2023    Cardiogenic shock ([facility]) 05/24/2023    Ulcer with gangrene, with unspecified severity ([facility]) 05/22/2023    Abnormal cardiovascular stress test 05/10/2023    Chest heaviness 05/08/2023    Respiratory failure ([facility]) 04/18/2022    Pulmonary edema with diastolic CHF, NYHA class 3 ([facility]) 03/17/2022    [place]-rectal abscess     Somnolence     Atrial flutter ([facility])     SIRS (systemic inflammatory response syndrome) ([facility]) 02/21/2022    NSTEMI (non-ST elevated myocardial infarction) ([facility]) 01/12/2022    Acute respiratory failure with hypercapnia ([facility]) 11/30/2021    Acute pulmonary edema ([facility]) 11/30/2021    Grade II diastolic dysfunction 11/30/2021    Shock circulatory ([facility]) 11/30/2021    Smoker 11/30/2021    Normocytic normochromic anemia 11/30/2021    Respiratory failure with hypoxia ([facility]) 11/29/2021    Speech problem     Urinary tract infection with hematuria     Acute CVA (cerebrovascular accident) ([facility]) 09/07/2021    Acute hypoxemic respiratory failure ([facility]) 08/12/2021    Uncontrolled type 2 diabetes mellitus with hyperglycemia ([facility]) 06/27/2021    Acute encephalopathy 06/27/2021    Cellulitis and abscess of hand 04/24/2021    Iliac artery occlusion, 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

## 2024-01-11 NOTE — PROGRESS NOTES
Pt called very anxious stating \"I need air, I need air!\" And begging for the bipap. Bipap placed back on 14/6 25%.

## 2024-01-11 NOTE — PROGRESS NOTES
compliance. Will have SW consult to help with HHN and meds. He is agreeable and todl me he just has so many meds he gets confused. Try to streamline med regimen.  Start Toprol XL 12.5 qd and low dose Entresto 0.5 tablet 24-26mg BID for GDMT. See if tolerates these meds and if so can add MRA later.  No SGLT2i due to ESRD.      Note acute on chronic sCHF, HTN emergency, ICM, CAD, CP, non-compliance increase his morbidity and mortality requiring aggressive med tx.     Patient Active Problem List   Diagnosis    Sepsis without acute organ dysfunction ([facility])    CHF (congestive heart failure) ([facility])    Asthma-COPD overlap syndrome    CAD in native artery    PVD (peripheral vascular disease) ([facility])    Bicuspid aortic valve    Bilateral hilar adenopathy syndrome    Claudication in peripheral vascular disease ([facility])    Uncontrolled hypertension    Diabetic neuropathy ([facility])    Type 2 DM with hypertension and ESRD on dialysis ([facility])    Passive smoke exposure    Depression with anxiety    Community acquired pneumonia of left lower lobe of lung    Obesity    ZAINAB on CPAP    Degeneration of lumbar or lumbosacral intervertebral disc    Lumbar radiculopathy    Lumbosacral spondylosis without myelopathy    Biliary colic    Symptomatic cholelithiasis    Gastroparesis due to DM ([facility])    Angina, class IV ([facility])    Dyspnea    Dyslipidemia    Acute on chronic combined systolic and diastolic CHF (congestive heart failure) ([facility])    Ischemic cardiomyopathy    Tobacco abuse    CVA (cerebral vascular accident) ([facility])    History of CVA (cerebrovascular accident)    Type 2 diabetes mellitus without complication, without long-term current use of insulin ([facility])    ZAINAB treated with BiPAP    Pleural effusion on left    Chronic anemia    Nonrheumatic aortic valve stenosis    Mucus plugging of bronchi    Hemodialysis-associated hypotension    ESRD on dialysis ([facility])    Hypotension due to drugs    Acute diastolic CHF (congestive heart failure) ([facility])     Neuromuscular disorder (H[facility])    Renovascular hypertension    Mixed hyperlipidemia    Cigarette nicotine dependence in remission    Pulmonary edema    Fluid overload    Anemia of chronic disease    SOB (shortness of breath) on exertion    Steal syndrome of dialysis vascular access ([facility])    Chronic, continuous use of opioids    Chronic bronchitis ([facility])    Nasal congestion    Hypercholesteremia    Bradycardia    S/P TAVR (transcatheter aortic valve replacement)    Syncope and collapse    PAF (paroxysmal atrial fibrillation) ([facility])    Bilateral leg weakness    GBS (Guillain-Montrose syndrome) ([facility])    Sinus pause    Weakness of both lower extremities    Sinus bradycardia    Ataxia    Peripheral vascular occlusive disease ([facility])    Cellulitis of right foot    Iliac artery occlusion, right ([facility])    Cellulitis and abscess of hand    Uncontrolled type 2 diabetes mellitus with hyperglycemia ([facility])    Acute encephalopathy    Acute hypoxemic respiratory failure ([facility])    Acute CVA (cerebrovascular accident) ([facility])    Speech problem    Urinary tract infection with hematuria    Respiratory failure with hypoxia ([facility])    Acute respiratory failure with hypercapnia ([facility])    Acute pulmonary edema ([facility])    Grade II diastolic dysfunction    Shock circulatory ([facility])    Smoker    Normocytic normochromic anemia    NSTEMI (non-ST elevated myocardial infarction) ([facility])    SIRS (systemic inflammatory response syndrome) ([facility])    Atrial flutter ([facility])    Liberty-rectal abscess    Somnolence    Pulmonary edema with diastolic CHF, NYHA class 3 ([facility])    Respiratory failure ([facility])    Former smoker    Cardiomyopathy ([facility])    Morbid obesity with BMI of 40.0-44.9, adult ([facility])    Hypoglycemia    Altered mental status    Hypertensive emergency    Dyspnea and respiratory abnormalities    Acute respiratory failure with hypoxia and hypercapnia ([facility])    HFrEF (heart failure with reduced ejection fraction) ([facility])    Pericardial effusion    Hypervolemia    Pneumonia of left

## 2024-01-11 NOTE — PROGRESS NOTES
Nephrology ok with ordering heart failure meds. Cardiology ordered Toprol and entresto.    Pt does not want to come off bipap at this time to eat or take meds. Retimed meds for later to try again.

## 2024-01-11 NOTE — CARE COORDINATION
CM met with patient, patient's sister, sister's spouse, and Jessika Guillaume - the patent's CM with [employer].    Jessika advised the patient does not take his medications as he should, he refuses to go to dialysis if he is \"tired\" and he prefers to be in the hospital. The patient has had multiple admits in a short period of time. The patient is to have a home visiting provider but will not answer the door when the provider goes to his home.     Recently the patient missed dialysis because he was tired and supposedly the spouse made another appointment and he didn't go to that one either. The patient has been fired from [facility] for missing chair times but they did take him back. The patient has been fired from multiple PCPs.     Jessika reports the patient just told her that \"he wants to be here because he can push a button and someone brings him coffee\". The patient attempts to get staff to do everything for him when he can get up to the bedside commode and he can move around and do things form himself.    The patient was set up for a SNF at his last visit and then declined to go. The patient is asking for a SNF again today. The choice is [facility]. Spouse is now at the bedside. She told the patient [facility] would be better than [facility].     CM made referral to [facility].

## 2024-01-11 NOTE — PROGRESS NOTES
01/10/24 2313   NIV Type   NIV Started/Stopped On   Equipment Type v60   Mode Bilevel   Mask Type Full face mask   Mask Size Large   Assessment   Pulse 70   SpO2 96 %   Settings/Measurements   IPAP 14 cmH20   CPAP/EPAP 6 cmH2O   Vt (Measured) 575 mL   Rate Ordered 16   FiO2  30 %   I Time/ I Time % 0.9 s   Minute Volume (L/min) 9.3 Liters   Mask Leak (lpm) 12 lpm   Patient's Home Machine No

## 2024-01-11 NOTE — PROGRESS NOTES
----- Message from Andrez Hi MA sent at 10/16/2023  2:16 PM CDT -----    ----- Message -----  From: Adolfo Kent  Sent: 10/16/2023   1:53 PM CDT  [facility]    Type:  Sooner Apoointment Request    Caller is requesting a sooner appointment.  Caller declined first available appointment listed below.  Caller will not accept being placed on the waitlist and is requesting a message be sent to doctor.  Name of Caller:pt  When is the first available appointment?11/07  Symptoms:Bronchiectasis without complication [J47.9]  Would the patient rather a call back or a response via [facility]? call  Best Call Back Number:[phone]  Additional Information:           Rounding completed with Dr. Adkins and multidisciplinary team. POC, labs, lines and assessment reviewed.   - If pt is able to tolerate being off bipap for 2hrs - can transfer to PCU  - Start home dose trazadone HS

## 2024-01-11 NOTE — PROGRESS NOTES
RT Inhaler-Nebulizer Bronchodilator Protocol Note    There is a bronchodilator order in the chart from a provider indicating to follow the RT Bronchodilator Protocol and there is an “Initiate RT Inhaler-Nebulizer Bronchodilator Protocol” order as well (see protocol at bottom of note).    CXR Findings:  XR CHEST PORTABLE    Result Date: 1/9/2024  Small bilateral pleural effusions with bibasilar airspace disease.  This could reflect pneumonia in the appropriate clinical setting.       The findings from the last RT Protocol Assessment were as follows:   History Pulmonary Disease: Chronic pulmonary disease  Respiratory Pattern: Dyspnea on exertion or RR 21-25 bpm  Breath Sounds: Slightly diminished and/or crackles  Cough: Strong, spontaneous, non-productive  Indication for Bronchodilator Therapy: Decreased or absent breath sounds  Bronchodilator Assessment Score: 6    Aerosolized bronchodilator medication orders have been revised according to the RT Inhaler-Nebulizer Bronchodilator Protocol below.    Respiratory Therapist to perform RT Therapy Protocol Assessment initially then follow the protocol.  Repeat RT Therapy Protocol Assessment PRN for score 0-3 or on second treatment, BID, and PRN for scores above 3.    No Indications - adjust the frequency to every 6 hours PRN wheezing or bronchospasm, if no treatments needed after 48 hours then discontinue using Per Protocol order mode.     If indication present, adjust the RT bronchodilator orders based on the Bronchodilator Assessment Score as indicated below.  Use Inhaler orders unless patient has one or more of the following: on home nebulizer, not able to hold breath for 10 seconds, is not alert and oriented, cannot activate and use MDI correctly, or respiratory rate 25 breaths per minute or more, then use the equivalent nebulizer order(s) with same Frequency and PRN reasons based on the score.  If a patient is on this medication at home then do not decrease Frequency

## 2024-01-11 NOTE — DISCHARGE INSTRUCTIONS
Heart Failure Resources:  Heart Failure Interactive Workbook:  Go to https://ChegongfangitalTaxiMe.Thrupoint/publication/?b=226067 for a Free Heart Failure Interactive Workbook provided by The American Heart Association. This interactive workbook will provide information on Healthier Living with Heart Failure. Please copy and paste link into search bar. Use your mouse to scroll through the pages.    HF [place] stephanie:   Heart Failure Free smart phone stephanie available for iPhone and Android download. Use your phone to track sodium intake, fluid intake, symptoms, and weight.     Low Sodium Diet / Recipes:  Go to www.[employer].Taodangpu website for “renal” diet which is Low Sodium! [employer] [facility] is a dialysis company, but this website offers free seasonal cookbooks. Each quarter, they will release 25-30 new recipes with a breakdown of calories, sodium, and glucose. You can also go to wwwDrizly/recipes website for free recipes.     Discharge Instruction Video:  Scan the QR code below with your camera and click the canva.com link to open the video and watch educational information on Heart Failure and Medications from one of our nurses.   [email or web address]/edit    Home Exercise Program:   Identification of Green/Yellow/Red zones:  You should be able to identify when you feel good (green zone), if you have 1-2 symptoms of HF (yellow zone), or if you are in need of medical attention (red zone).  In your CHF education folder you were provided a “stop light tool” to outline this information.     We want to you to rate your exertion levels:    Our therapy team has discussed means of identification with you such as the \"John scale.\"  The John rating scale ranges from 6 to 20, where 6 means \"no exertion at all\" and 20 means \"maximal exertion.\" The goal is to use this to gauge how much effort it is taking for you to do your normal daily tasks.   You should be able to recognize when too much exertion is

## 2024-01-11 NOTE — ACP (ADVANCE CARE PLANNING)
Advance Care Planning     General Advance Care Planning (ACP) Conversation    Date of Conversation: 1/9/2024  Conducted with: Patient with Decision Making Capacity   Healthcare Decision Maker: Named in Advance Directive or Healthcare Power of  (name) Patient made sister POA today.     Healthcare Decision Maker:    Primary Decision Maker: EmeryJoya - Chuck Child - [phone]    Secondary Decision Maker: Sridevi Salmeron - Brother/Sister - [phone]    Supplemental (Other) Decision Maker: AmyBrianna - Step Child - [phone]  Click here to complete Healthcare Decision Makers including selection of the Healthcare Decision Maker Relationship (ie \"Primary\").   Today we referred to ACP Clinical Specialist for assistance.    Content/Action Overview:  Has ACP document(s) on file - reflects the patient's care preferences  Reviewed DNR/DNI and patient elects Full Code (Attempt Resuscitation)    PAPERWORK IN PAPER CHART - NEW POA    Length of Voluntary ACP Conversation in minutes:  <16 minutes (Non-Billable)    Teresa Boeck, RN

## 2024-01-11 NOTE — CARE COORDINATION
Per conversation with Jackie/NAVJOT the patient reports he doesn't take his medications at home. The patient has 27 medications currently and he does not know which medications to take when. Alva Chanel is adding 3 more medications to the list.     ETHAN spoke to the patient's spouse/Crystal. The spouse reports she does not help the patient with his medications because she is unsure of what he is supposed to be taking and when. Crystal states if she could get a list of what the patient should be taking and when he should take them - she can make sure the patient takes the medication    CM advised that the patient's nurse today, Jackie RN will provide a medication list at discharge that shows what medication the patient should be taking and when. Crystal reports the patient's medications were coming in pill packs however the patient was taken off some of the medications and she and the patient do not know how to sort that out.     ETHAN asked if the patient has a pill organizer. Crystal does have a pill organizer she can use. ETHAN advised it is probably best to acquire the medications in the original bottle and then Crystal can sort the medications and she will always be able to resort back to the bottle for comparison if something changes.    ETHAN advised that going forward the patient can also contact his PCP if he is confused about his medications and someone at the PCP's office should be able to do a med reconciliation with the patient. The pharmacy can also help.    Crystal advised the patient does not have a PCP. The patient was supposed to have a visiting PCP but the family never hears the provider knock as the patient keeps an industrial size fan going. Crystal would like to take the patient to see her PCP.    ETHAN advised Crystal that she should definitely set the patient up with her PCP and going forward she and the patient can schedule visits on the same day. Crystal states she is able to transport the patient and

## 2024-01-11 NOTE — PROGRESS NOTES
Pulmonary Progress Note  CC: Acute on chronic resp failure    Subjective:  pt refused to come off of Bipap to take Lokelma. O2 improve overnight.  Precedex started for anxiety yesterday    EXAM: /71   Pulse 68   Temp 97.7 °F (36.5 °C)   Resp 22   Ht 1.753 m (5' 9\")   Wt 90.1 kg (198 lb 11.2 oz)   SpO2 97%   BMI 29.34 kg/m²  on Bipap  Constitutional:  No acute distress.   Eyes: PERRL. Conjunctivae anicteric.   ENT: Normal nose. Normal tongue.    Neck:  Trachea is midline.   Respiratory: No accessory muscle usage.  Decreased breath sounds. No wheezes. + rales. No Rhonchi.  Cardiovascular: Normal S1S2. No digit clubbing. No digit cyanosis. No LE edema.   Psychiatric: No anxiety or Agitation. Alert and Oriented to person, place and time.    Scheduled Meds:   insulin glargine  10 Units SubCUTAneous Nightly    insulin lispro  0-4 Units SubCUTAneous Q4H    insulin lispro  0-4 Units SubCUTAneous Once    mupirocin   Each Nostril BID    epoetin siddharth-epbx  10,000 Units IntraVENous Once per day on Mon Wed Fri    apixaban  5 mg Oral BID    busPIRone  10 mg Oral TID    clopidogrel  75 mg Oral Daily    DULoxetine  60 mg Oral Daily    pravastatin  40 mg Oral QHS    torsemide  100 mg Oral Daily    QUEtiapine  50 mg Oral Nightly    sodium chloride flush  5-40 mL IntraVENous 2 times per day    ipratropium 0.5 mg-albuterol 2.5 mg  1 Dose Inhalation 4x Daily RT     Continuous Infusions:   dexmedeTOMIDine HCl in NaCl 0.3 mcg/kg/hr (01/11/24 0537)    sodium chloride      dextrose       PRN Meds:  ipratropium 0.5 mg-albuterol 2.5 mg, heparin (porcine), sodium chloride flush, sodium chloride, ondansetron **OR** ondansetron, polyethylene glycol, acetaminophen **OR** acetaminophen, labetalol, glucose, dextrose bolus **OR** dextrose bolus, glucagon (rDNA), dextrose, dextrose bolus **OR** dextrose bolus, hydrALAZINE    Labs:  CBC:   Recent Labs     01/09/24  1142 01/10/24  0410 01/11/24  0422   WBC 12.3* 10.4 8.7   HGB 10.7* 8.7*

## 2024-01-12 LAB
ANION GAP SERPL CALCULATED.3IONS-SCNC: 18 MMOL/L (ref 3–16)
BASOPHILS # BLD: 0.1 K/UL (ref 0–0.2)
BASOPHILS NFR BLD: 0.5 %
BUN SERPL-MCNC: 92 MG/DL (ref 7–20)
CALCIUM SERPL-MCNC: 8.9 MG/DL (ref 8.3–10.6)
CHLORIDE SERPL-SCNC: 97 MMOL/L (ref 99–110)
CO2 SERPL-SCNC: 23 MMOL/L (ref 21–32)
CREAT SERPL-MCNC: 7.2 MG/DL (ref 0.9–1.3)
DEPRECATED RDW RBC AUTO: 20.5 % (ref 12.4–15.4)
EOSINOPHIL # BLD: 0.2 K/UL (ref 0–0.6)
EOSINOPHIL NFR BLD: 1.7 %
GFR SERPLBLD CREATININE-BSD FMLA CKD-EPI: 8 ML/MIN/{1.73_M2}
GLUCOSE BLD-MCNC: 131 MG/DL (ref 70–99)
GLUCOSE BLD-MCNC: 142 MG/DL (ref 70–99)
GLUCOSE BLD-MCNC: 144 MG/DL (ref 70–99)
GLUCOSE BLD-MCNC: 148 MG/DL (ref 70–99)
GLUCOSE BLD-MCNC: 178 MG/DL (ref 70–99)
GLUCOSE BLD-MCNC: 201 MG/DL (ref 70–99)
GLUCOSE BLD-MCNC: 277 MG/DL (ref 70–99)
GLUCOSE SERPL-MCNC: 137 MG/DL (ref 70–99)
HCT VFR BLD AUTO: 33.6 % (ref 40.5–52.5)
HGB BLD-MCNC: 11 G/DL (ref 13.5–17.5)
LYMPHOCYTES # BLD: 0.8 K/UL (ref 1–5.1)
LYMPHOCYTES NFR BLD: 7.5 %
MCH RBC QN AUTO: 30.6 PG (ref 26–34)
MCHC RBC AUTO-ENTMCNC: 32.6 G/DL (ref 31–36)
MCV RBC AUTO: 93.8 FL (ref 80–100)
MONOCYTES # BLD: 0.8 K/UL (ref 0–1.3)
MONOCYTES NFR BLD: 7.6 %
NEUTROPHILS # BLD: 8.5 K/UL (ref 1.7–7.7)
NEUTROPHILS NFR BLD: 82.7 %
PERFORMED ON: ABNORMAL
PLATELET # BLD AUTO: 167 K/UL (ref 135–450)
PMV BLD AUTO: 8.2 FL (ref 5–10.5)
POTASSIUM SERPL-SCNC: 4.7 MMOL/L (ref 3.5–5.1)
RBC # BLD AUTO: 3.58 M/UL (ref 4.2–5.9)
SODIUM SERPL-SCNC: 138 MMOL/L (ref 136–145)
WBC # BLD AUTO: 10.3 K/UL (ref 4–11)

## 2024-01-12 PROCEDURE — 6360000002 HC RX W HCPCS

## 2024-01-12 PROCEDURE — 6370000000 HC RX 637 (ALT 250 FOR IP)

## 2024-01-12 PROCEDURE — 6370000000 HC RX 637 (ALT 250 FOR IP): Performed by: INTERNAL MEDICINE

## 2024-01-12 PROCEDURE — 94660 CPAP INITIATION&MGMT: CPT

## 2024-01-12 PROCEDURE — P9047 ALBUMIN (HUMAN), 25%, 50ML: HCPCS

## 2024-01-12 PROCEDURE — 2700000000 HC OXYGEN THERAPY PER DAY

## 2024-01-12 PROCEDURE — 99233 SBSQ HOSP IP/OBS HIGH 50: CPT | Performed by: INTERNAL MEDICINE

## 2024-01-12 PROCEDURE — 97161 PT EVAL LOW COMPLEX 20 MIN: CPT

## 2024-01-12 PROCEDURE — 97165 OT EVAL LOW COMPLEX 30 MIN: CPT

## 2024-01-12 PROCEDURE — 36415 COLL VENOUS BLD VENIPUNCTURE: CPT

## 2024-01-12 PROCEDURE — 94761 N-INVAS EAR/PLS OXIMETRY MLT: CPT

## 2024-01-12 PROCEDURE — 97530 THERAPEUTIC ACTIVITIES: CPT

## 2024-01-12 PROCEDURE — 80048 BASIC METABOLIC PNL TOTAL CA: CPT

## 2024-01-12 PROCEDURE — 99232 SBSQ HOSP IP/OBS MODERATE 35: CPT | Performed by: INTERNAL MEDICINE

## 2024-01-12 PROCEDURE — 2580000003 HC RX 258

## 2024-01-12 PROCEDURE — 94640 AIRWAY INHALATION TREATMENT: CPT

## 2024-01-12 PROCEDURE — 1200000000 HC SEMI PRIVATE

## 2024-01-12 PROCEDURE — 85025 COMPLETE CBC W/AUTO DIFF WBC: CPT

## 2024-01-12 PROCEDURE — 97535 SELF CARE MNGMENT TRAINING: CPT

## 2024-01-12 RX ORDER — ALBUMIN (HUMAN) 12.5 G/50ML
25 SOLUTION INTRAVENOUS ONCE
Status: COMPLETED | OUTPATIENT
Start: 2024-01-12 | End: 2024-01-12

## 2024-01-12 RX ORDER — SPIRONOLACTONE 25 MG/1
12.5 TABLET ORAL DAILY
Status: DISCONTINUED | OUTPATIENT
Start: 2024-01-12 | End: 2024-01-15 | Stop reason: HOSPADM

## 2024-01-12 RX ORDER — METOPROLOL SUCCINATE 25 MG/1
25 TABLET, EXTENDED RELEASE ORAL DAILY
Qty: 30 TABLET | Refills: 1 | Status: SHIPPED | OUTPATIENT
Start: 2024-01-12

## 2024-01-12 RX ORDER — ALBUMIN (HUMAN) 12.5 G/50ML
SOLUTION INTRAVENOUS
Status: COMPLETED
Start: 2024-01-12 | End: 2024-01-12

## 2024-01-12 RX ORDER — SPIRONOLACTONE 25 MG/1
12.5 TABLET ORAL DAILY
Qty: 30 TABLET | Refills: 0
Start: 2024-01-13

## 2024-01-12 RX ADMIN — EPOETIN ALFA-EPBX 10000 UNITS: 10000 INJECTION, SOLUTION INTRAVENOUS; SUBCUTANEOUS at 10:32

## 2024-01-12 RX ADMIN — IPRATROPIUM BROMIDE AND ALBUTEROL SULFATE 1 DOSE: 2.5; .5 SOLUTION RESPIRATORY (INHALATION) at 07:26

## 2024-01-12 RX ADMIN — ONDANSETRON 4 MG: 2 INJECTION INTRAMUSCULAR; INTRAVENOUS at 20:09

## 2024-01-12 RX ADMIN — QUETIAPINE FUMARATE 50 MG: 25 TABLET ORAL at 20:07

## 2024-01-12 RX ADMIN — INSULIN GLARGINE 10 UNITS: 100 INJECTION, SOLUTION SUBCUTANEOUS at 20:57

## 2024-01-12 RX ADMIN — ACETAMINOPHEN 650 MG: 325 TABLET ORAL at 20:07

## 2024-01-12 RX ADMIN — Medication 10 ML: at 09:13

## 2024-01-12 RX ADMIN — SACUBITRIL AND VALSARTAN 1 TABLET: 24; 26 TABLET, FILM COATED ORAL at 20:06

## 2024-01-12 RX ADMIN — TRAZODONE HYDROCHLORIDE 50 MG: 50 TABLET ORAL at 20:07

## 2024-01-12 RX ADMIN — PRAVASTATIN SODIUM 40 MG: 40 TABLET ORAL at 20:07

## 2024-01-12 RX ADMIN — INSULIN LISPRO 1 UNITS: 100 INJECTION, SOLUTION INTRAVENOUS; SUBCUTANEOUS at 20:56

## 2024-01-12 RX ADMIN — INSULIN LISPRO 2 UNITS: 100 INJECTION, SOLUTION INTRAVENOUS; SUBCUTANEOUS at 17:22

## 2024-01-12 RX ADMIN — BUSPIRONE HYDROCHLORIDE 10 MG: 10 TABLET ORAL at 13:47

## 2024-01-12 RX ADMIN — DULOXETINE HYDROCHLORIDE 60 MG: 60 CAPSULE, DELAYED RELEASE ORAL at 09:13

## 2024-01-12 RX ADMIN — BUSPIRONE HYDROCHLORIDE 10 MG: 10 TABLET ORAL at 20:07

## 2024-01-12 RX ADMIN — METOPROLOL SUCCINATE 25 MG: 25 TABLET, EXTENDED RELEASE ORAL at 13:39

## 2024-01-12 RX ADMIN — MUPIROCIN: 20 OINTMENT TOPICAL at 20:09

## 2024-01-12 RX ADMIN — IPRATROPIUM BROMIDE AND ALBUTEROL SULFATE 1 DOSE: 2.5; .5 SOLUTION RESPIRATORY (INHALATION) at 19:26

## 2024-01-12 RX ADMIN — SPIRONOLACTONE 12.5 MG: 25 TABLET ORAL at 13:48

## 2024-01-12 RX ADMIN — ALBUMIN (HUMAN) 25 G: 12.5 SOLUTION INTRAVENOUS at 11:51

## 2024-01-12 RX ADMIN — BUSPIRONE HYDROCHLORIDE 10 MG: 10 TABLET ORAL at 08:45

## 2024-01-12 RX ADMIN — ALBUMIN (HUMAN) 25 G: 0.25 INJECTION, SOLUTION INTRAVENOUS at 11:51

## 2024-01-12 RX ADMIN — Medication 10 ML: at 20:07

## 2024-01-12 RX ADMIN — APIXABAN 5 MG: 5 TABLET, FILM COATED ORAL at 20:07

## 2024-01-12 ASSESSMENT — PAIN SCALES - GENERAL
PAINLEVEL_OUTOF10: 8
PAINLEVEL_OUTOF10: 0

## 2024-01-12 ASSESSMENT — PAIN DESCRIPTION - ORIENTATION: ORIENTATION: MID;LOWER

## 2024-01-12 ASSESSMENT — PAIN DESCRIPTION - LOCATION: LOCATION: BACK

## 2024-01-12 ASSESSMENT — PAIN DESCRIPTION - DESCRIPTORS: DESCRIPTORS: ACHING;SHARP;SHOOTING

## 2024-01-12 ASSESSMENT — PAIN DESCRIPTION - PAIN TYPE: TYPE: ACUTE PAIN

## 2024-01-12 ASSESSMENT — PAIN - FUNCTIONAL ASSESSMENT: PAIN_FUNCTIONAL_ASSESSMENT: PREVENTS OR INTERFERES SOME ACTIVE ACTIVITIES AND ADLS

## 2024-01-12 ASSESSMENT — PAIN DESCRIPTION - FREQUENCY: FREQUENCY: CONTINUOUS

## 2024-01-12 NOTE — PROGRESS NOTES
[facility]   Progress Note  Cardiology      HPI: Mr. Ann is being seen today for f/u unspecified CP, HTN emergency, abnormal EKG finding, elevated Anders, and acute on chronic systolic CHF. He c/o chronic back pain and some mild SOB. He just returned from dialysis. Tele NSR 92, PVC's.    Physical Examination:    Vitals:    01/12/24 1100   BP:    Pulse: 91   Resp:    Temp:    SpO2: 100%          Constitutional and General Appearance: NAD   Respiratory:  Normal excursion and expansion without use of accessory muscles  Resp Auscultation: Soft, clear breath sounds  Cardiovascular:  The apical impulses not displaced  Heart tones are crisp and normal  Cervical veins are not engorged  The carotid upstroke is normal in amplitude and contour without delay or bruit  Normal S1S2, No S3, No Murmur  Peripheral pulses are symmetrical and full  There is no clubbing, cyanosis of the extremities.  No edema  Pedal Pulses: 2+ and equal   Abdomen:  No masses or tenderness  Liver/Spleen: No Abnormalities Noted  Neurological/Psychiatric:  Alert and oriented in all spheres  Moves all extremities well  No abnormalities of mood, affect, memory, mentation, or behavior are noted  Skin: warm and dry      Lab Results   Component Value Date    WBC 10.3 01/12/2024    HGB 11.0 (L) 01/12/2024    HCT 33.6 (L) 01/12/2024    MCV 93.8 01/12/2024     01/12/2024     Lab Results   Component Value Date    CREATININE 7.2 (HH) 01/12/2024    BUN 92 (HH) 01/12/2024     01/12/2024    K 4.7 01/12/2024    CL 97 (L) 01/12/2024    CO2 23 01/12/2024     Lab Results   Component Value Date    INR 1.05 01/02/2024    PROTIME 13.7 01/02/2024        Assessment:  Igor Ann is a 53 y.o. patient who presented to [facility] 01/09/2024 with c/o SOB.  He has PMH CAD s/p PATRICIA RI, CCx, and RCA, bicuspid AV s/p TAVR, ICM EF=25-30%; chronic systolic CHF, non-compliance, COPD, PVD, DM, ZAINAB, hx DVT on eliquis, hx CVA, and ESRD on HD.  Echo 3/10/2023  EF=25-30%; +WMA;  Grade I DD; mild TR and MR.  Denise-nuc 5/9/2023 noted reduced activity within the alex-septal, infero-apical, and posterior-lateral segments c/w mainly prior infarction; mild darrick-infarct ischemia near the mid-inferior segment; LV size severely dilated. EF= 34%.  [facility] 5/09/23 severe MVCAD; EF=30-35% and global HK; elevated LV end-diastolic at 24 with successful IVUS guided PATRICIA insertion to RI, native CCx, and RCA.   Now presents with c/o SOB mainly. He was recently admitted on 1/02-1/6/2024 with dyspnea.   Patient arrived by EMS. He missed dialysis twice and reports not taking medication. He was satting in the 50's upon their arrival.  In ER he was satting 100% on BiPAP. Note BP severely elevated > 200/100. Reports waking up with SOB and left chest pressure.  Admission Testing:  CXR small bilateral pleural effusions with bibasilar ASD; Admit EKG noted possible AT; left posterior fascicular block; T wave abnormality, consider inferior ischemia. Second EKG NSR 70; IVCD; Admitting LABS: , K 4.9, BUN/Cr 113/9.8, Pro-BNP >70,000, ALT 15, AST 14, H/H 8.7/26.8 and Anders 231, 159 and 210 (292 on 1/2/24). Negative Covid/flu. Patient did meet criteria for DKA with a mild acidosis and AG and given IV insulin.  He was also given 80 mg IV lasix and admitted to ICU.   Diagnosis unspecified CP, HTN emergency, abnormal EKG finding, elevated Anders, and acute on chronic systolic CHF in middle-aged male with multiple medical problems.     Recs:  Main issue appears to be non-compliance with meds, doc visits, dialysis. Hard to correct.  Elevated Anders chronically and likely due to demand ischemia from hypoxia, HTN, and ESRD.  EKG finding ? Atach but repeat EKG NSR and no ischemic ST changes.  Dialyze and support O2 for now.  Continue eliquis 5 BID, plavix 75 qd, pravachol 40 qd, torsemide 100 qd.  No need for cardiac testing at this time. Await medical stabilization. Once stable I am not sure cardiac testing necessary.

## 2024-01-12 NOTE — PROGRESS NOTES
Department of Internal Medicine  Nephrology Progress Note        SUBJECTIVE:    We are following this patient for ESRD management  The patient was seen and examined during dialysis; he feels well today with no CP, SOB, nausea or vomiting.    ROS: No fever or chills.  Social: No family at bedside.      Physical Exam:    VITALS:  BP (!) 135/50   Pulse 84   Temp 97.7 °F (36.5 °C)   Resp 18   Ht 1.753 m (5' 9\")   Wt 89.7 kg (197 lb 12 oz)   SpO2 98%   BMI 29.20 kg/m²     General appearance: Seems comfortable, no acute distress.  Neck: Trachea midline, thyroid normal.   Lungs:  Non labored breathing, CTA to anterior auscultation.  Heart:  S1S2 normal, rub or gallop. No peripheral edema.  Abdomen: Soft, non-tender, no organomegaly.   Skin: No lesions or rashes, warm to touch.     DATA:    CBC with Differential:    Lab Results   Component Value Date/Time    WBC 10.3 01/12/2024 04:26 AM    RBC 3.58 01/12/2024 04:26 AM    HGB 11.0 01/12/2024 04:26 AM    HCT 33.6 01/12/2024 04:26 AM     01/12/2024 04:26 AM    MCV 93.8 01/12/2024 04:26 AM    MCH 30.6 01/12/2024 04:26 AM    MCHC 32.6 01/12/2024 04:26 AM    RDW 20.5 01/12/2024 04:26 AM    SEGSPCT 73.4 05/17/2013 06:50 AM    BANDSPCT 5 01/11/2024 04:22 AM    METASPCT 2 03/03/2022 07:41 AM    LYMPHOPCT 7.5 01/12/2024 04:26 AM    MONOPCT 7.6 01/12/2024 04:26 AM    MYELOPCT 2 03/16/2023 04:45 AM    BASOPCT 0.5 01/12/2024 04:26 AM    MONOSABS 0.8 01/12/2024 04:26 AM    LYMPHSABS 0.8 01/12/2024 04:26 AM    EOSABS 0.2 01/12/2024 04:26 AM    BASOSABS 0.1 01/12/2024 04:26 AM    DIFFTYPE Auto 05/17/2013 06:50 AM     BMP:    Lab Results   Component Value Date/Time     01/12/2024 04:25 AM    K 4.7 01/12/2024 04:25 AM    CL 97 01/12/2024 04:25 AM    CO2 23 01/12/2024 04:25 AM    BUN 92 01/12/2024 04:25 AM    LABALBU 4.1 01/09/2024 07:41 PM    CREATININE 7.2 01/12/2024 04:25 AM    CALCIUM 8.9 01/12/2024 04:25 AM    GFRAA 10 10/05/2022 11:53 AM    GFRAA >60 05/17/2013 06:50

## 2024-01-12 NOTE — DISCHARGE INSTR - COC
Continuity of Care Form    Patient Name: Igor Ann   :  1970  MRN:  [id number]    Admit date:  2024  Discharge date:  ***    Code Status Order: Full Code   Advance Directives:     Admitting Physician:  Andria Nicloe MD  PCP: Vonnie Cleveland APRN - NP    Discharging Nurse: ***  Discharging Hospital Unit/Room#: 3002/3002-01  Discharging Unit Phone Number: ***    Emergency Contact:   Extended Emergency Contact Information  Primary Emergency Contact: Crystal Ann  Address: [address]                      [place] [address] [facility]  Home Phone: [phone]  Mobile Phone: [phone]  Relation: Spouse  Secondary Emergency Contact: Sridevi Salmeron           [place], [place]  Home Phone: [phone]  Mobile Phone: [phone]  Relation: Brother/Sister  Preferred language: English   needed? No    Past Surgical History:  Past Surgical History:   Procedure Laterality Date    AORTIC VALVE REPLACEMENT N/A 10/15/2019    TRANSCATHETER AORTIC VALVE REPLACEMENT FEMORAL APPROACH performed by Dion Holalnd MD at [facility] CVOR    CATHETER REMOVAL Right 2019    PERITONEAL DIALYSIS CATHETER REMOVAL performed by Remi Kincaid MD at [facility] OR    COLONOSCOPY      COLONOSCOPY      WN    CORONARY ANGIOPLASTY WITH STENT PLACEMENT  05/26/15    CYST REMOVAL  2013    EXCISION CYSTS, NECK X2 AND ABDOMINAL benign    DIAGNOSTIC CARDIAC CATH LAB PROCEDURE      DIALYSIS FISTULA CREATION Left 10/30/2017    LEFT BRACHIAL CEPHALIC FISTULA    DIALYSIS FISTULA CREATION Left 3/27/2019    LIGATION  AV FISTULA performed by Brenden Rosario MD at [facility] OR    ENDOSCOPY, COLON, DIAGNOSTIC      FOOT DEBRIDEMENT Right 2023    INCISION AND DEBRIDEMENT RIGHT FOOT WITH performed by Cely Rodriguez DPM at [facility] OR    FOOT SURGERY Right 2023    RIGHT FIFTH RAY AMPUTATION performed by Cely Rodriguez DPM at [facility] OR    IR TUNNELED CATHETER PLACEMENT

## 2024-01-12 NOTE — PROGRESS NOTES
01/12/24 0255   NIV Type   Equipment Type v60   Mode Bilevel   Mask Type Full face mask   Mask Size Large   Assessment   Pulse 81   Comfort Level Good   Using Accessory Muscles No   Mask Compliance Good   Breath Sounds   Breath Sounds Bilateral Diminished   Settings/Measurements   IPAP 14 cmH20   CPAP/EPAP 6 cmH2O   Vt (Measured) 479 mL   Rate Ordered 16   FiO2  25 %   Minute Volume (L/min) 8.9 Liters   Mask Leak (lpm) 0 lpm   Patient's Home Machine No   Alarm Settings   Alarms On Y

## 2024-01-12 NOTE — FLOWSHEET NOTE
01/11/24 2000   Vitals   Temp 98.4 °F (36.9 °C)   Temp Source Oral   Pulse 89   Heart Rate Source Monitor   Respirations 18   BP (!) 165/95   MAP (Calculated) 118   MAP (mmHg) 116   BP Location Right upper arm   BP Upper/Lower Upper   BP Method Automatic   Patient Position Sitting   Oxygen Therapy   SpO2 96 %   O2 Device Nasal cannula   O2 Flow Rate (L/min) 4 L/min

## 2024-01-12 NOTE — PROGRESS NOTES
RT Inhaler-Nebulizer Bronchodilator Protocol Note    There is a bronchodilator order in the chart from a provider indicating to follow the RT Bronchodilator Protocol and there is an “Initiate RT Inhaler-Nebulizer Bronchodilator Protocol” order as well (see protocol at bottom of note).    CXR Findings:  No results found.    The findings from the last RT Protocol Assessment were as follows:   History Pulmonary Disease: (P) Chronic pulmonary disease  Respiratory Pattern: (P) Regular pattern and RR 12-20 bpm  Breath Sounds: (P) Slightly diminished and/or crackles  Cough: (P) Strong, spontaneous, non-productive  Indication for Bronchodilator Therapy: (P) Decreased or absent breath sounds, On home bronchodilators  Bronchodilator Assessment Score: (P) 4    Aerosolized bronchodilator medication orders have been revised according to the RT Inhaler-Nebulizer Bronchodilator Protocol below.    Respiratory Therapist to perform RT Therapy Protocol Assessment initially then follow the protocol.  Repeat RT Therapy Protocol Assessment PRN for score 0-3 or on second treatment, BID, and PRN for scores above 3.    No Indications - adjust the frequency to every 6 hours PRN wheezing or bronchospasm, if no treatments needed after 48 hours then discontinue using Per Protocol order mode.     If indication present, adjust the RT bronchodilator orders based on the Bronchodilator Assessment Score as indicated below.  Use Inhaler orders unless patient has one or more of the following: on home nebulizer, not able to hold breath for 10 seconds, is not alert and oriented, cannot activate and use MDI correctly, or respiratory rate 25 breaths per minute or more, then use the equivalent nebulizer order(s) with same Frequency and PRN reasons based on the score.  If a patient is on this medication at home then do not decrease Frequency below that used at home.    0-3 - enter or revise RT bronchodilator order(s) to equivalent RT Bronchodilator order

## 2024-01-12 NOTE — PROGRESS NOTES
Tib  4   Hamstring 4   Iliopsoas 4  L LE  Quad   4   Ant Tib  4   Hamstring 4-   Iliopsoas 4-    Lower Extremity Sensation    NT    Coordination  NT    Tone  Not Tested    Balance  Static Sitting:  Good ; Supervision  Dynamic Sitting:  Good ; Supervision   Comments:       Static Standing: Fair ; SBA  Dynamic Standing: Fair ; CGA  Comments:     Posture  Seated: Forward head and neck  Standing: Forward head and neck    Bed Mobility   Supine to Sit:    SBA  Sit to Supine:   Not Tested  Rolling:   Not Tested   Scooting in sitting: SBA   Scooting in supine:  Not Tested   Bridging:  Not Tested    Transfer Training     Sit to stand:   CGA   Stand to sit:   CGA   Bed to/from Chair:  CGA with use of gait belt and rolling walker (RW)    Gait gait completed as indicated below  Distance:      12 ft  Deviations (firm surface/linoleum):  none  Assistive Device Used:    gait belt and rolling walker (RW)  Level of Assist:    CGA   Comment:     Stair Training deferred, pt unsafe/ not appropriate to complete stairs at this time    Therapeutic Exercises Initiated  deferred secondary to treatment focus on functional mobility      Activity Tolerance   During therapy session noted pt with SOB/CASH  fatigue    Positioning Needs   Pt reclined in chair, alarm set, positioned in proper neutral alignment and pressure relief provided.   Call light provided and all needs within reach  RN aware of pt position/status    Other Activities  None.    Patient/Family Education   Pt educated on role of inpatient PT, POC, importance of continued activity, DC recommendations, safety awareness, pursed lip breathing, energy conservation, pacing activity, HEP, and calling for assist with mobility.      Assessment  Pt seen today for physical therapy Evaluation & Treatment. Pt demonstrated decreased Activity tolerance, Safety, and Strength as well as decreased independence with Ambulation, Bed Mobility , and Transfers. Skilled physical therapy is necessary to  the patient to progress toward full PLOF.    Recommending SNF upon discharge as patient functioning well below baseline, demonstrates good rehab potential and unable to return home due to limited or no family support, burden of care beyond caregiver ability, and home environment not conducive to patient recovery.    Goals :   To be met in 3 visits:  1). Independent with LE Ex x 10 reps  2). Sit to/from stand: Supervision  3). Bed to chair: Supervision      To be met in 6 visits:  1).  Supine to/from sit: Supervision  2).  Sit to/from stand: Supervision  3).  Bed to chair: Supervision  4).  Gait: Ambulate  25 ft.   with Supervision and use of gait belt and rolling walker (RW)  5).  Tolerate B LE exercises 3 sets of 10-15 reps    Rehabilitation Potential: Good  Strengths for achieving goals include:   Pt motivated, PLOF, and Pt cooperative   Barriers to achieving goals include:    No Barriers, Complexity of condition, Pain, and Weakness    Plan    To be seen 3-5 x / week  while in acute care setting for therapeutic exercises, bed mobility, transfers, progressive gait training, balance training, and family/patient education.    Signature: Ciro Purcell, PT     If patient discharges from this facility prior to next visit, this note will serve as the Discharge Summary.

## 2024-01-12 NOTE — PROGRESS NOTES

## 2024-01-12 NOTE — CARE COORDINATION
[facility] pre-cert request submitted via NH Access Portal.    Requested Facility:  [facility]  Anticipated Admit Date to [facility]:  01/12/2024  [facility] #:  [id number]

## 2024-01-12 NOTE — PROGRESS NOTES
01/11/24 2346   NIV Type   NIV Started/Stopped (S)  On   Equipment Type v60   Mode Bilevel   Mask Type Full face mask   Mask Size Large   Assessment   Pulse 87   SpO2 97 %   Comfort Level Good   Using Accessory Muscles No   Mask Compliance Good   Location Nose   Breath Sounds   Breath Sounds Bilateral Diminished   Settings/Measurements   IPAP 14 cmH20   CPAP/EPAP 6 cmH2O   Vt (Measured) 488 mL   Rate Ordered 16   FiO2  25 %   Minute Volume (L/min) 8.5 Liters   Mask Leak (lpm) 0 lpm   Patient's Home Machine No   Alarm Settings   Alarms On Y   Low Pressure (cmH2O) 8 cmH2O   High Pressure (cmH2O) 30 cmH2O   Apnea (secs) 20 secs   RR Low (bpm) 16   RR High (bpm) 40 br/min

## 2024-01-12 NOTE — PROGRESS NOTES
Dialysis completed in the AM. No PRN medication for BP was given during shift. Pt worked with PT, pt can be unsteady on his feet. Requires assistance to commode or to chair. Spent half the shift in the chair. RN spoke with CM, plan is to go to [facility] at discharge. No s/s of distress, VSS. See flowsheet and eMar for additional documentation.

## 2024-01-12 NOTE — PROGRESS NOTES
Patient's EF (Ejection Fraction) is less than 40%     HEART FAILURE CARE PLAN:    Comorbidities Reviewed: Yes   Patient has a past medical history of Ambulatory dysfunction, Aortic stenosis, Arthritis, Asthma, Bilateral hilar adenopathy syndrome, CAD (coronary artery disease), Cardiomyopathy ([facility]), CHF (congestive heart failure) ([facility]), Chronic pain, COPD (chronic obstructive pulmonary disease) ([facility]), CVA (cerebral vascular accident) ([facility]), Depression, Diabetes mellitus ([facility]), Difficult intravenous access, Emphysema of lung ([facility]), ESRD (end stage renal disease) on dialysis ([facility]), Fear of needles, Gastric ulcer, GERD (gastroesophageal reflux disease), Heart valve problem, Hemodialysis patient ([facility]), History of spinal fracture, Hx of blood clots, Hyperlipidemia, Hypertension, MI (myocardial infarction) ([facility]), Neuromuscular disorder ([facility]), Numbness and tingling in left arm, Pneumonia, PONV (postoperative nausea and vomiting), Prolonged emergence from general anesthesia, Sleep apnea, Stroke ([facility]), TIA (transient ischemic attack), and Unspecified diseases of blood and blood-forming organs.     ECHOCARDIOGRAM Reviewed: Yes   Patient's Ejection Fraction (EF) is less than 40%    Weights Reviewed: Yes   Admission weight: 91.5 kg (201 lb 11.2 oz)   Wt Readings from Last 3 Encounters:   01/12/24 89.7 kg (197 lb 11.2 oz)   01/05/24 91.1 kg (200 lb 13.4 oz)   12/29/23 95.3 kg (210 lb)     Intake & Output Reviewed: Yes     Intake/Output Summary (Last 24 hours) at 1/12/2024 0705  Last data filed at 1/12/2024 0430  Gross per 24 hour   Intake 524.9 ml   Output 75 ml   Net 449.9 ml     Medications Reviewed: Yes   SCHEDULED HOSPITAL MEDICATIONS:   traZODone  50 mg Oral Nightly    ipratropium 0.5 mg-albuterol 2.5 mg  1 Dose Inhalation TID    metoprolol succinate  25 mg Oral Daily    sacubitril-valsartan  0.5 tablet Oral BID    insulin glargine  10 Units SubCUTAneous Nightly    insulin lispro  0-4 Units SubCUTAneous Q4H    insulin lispro

## 2024-01-12 NOTE — PROGRESS NOTES
Inpatient Occupational Therapy Evaluation and Treatment    Unit: ICU  Date:  1/12/2024  Patient Name:    Iogr Ann  Admitting diagnosis:  Shortness of breath [R06.02]  Acute respiratory failure with hypoxia (HCC) [J96.01]  Acute on chronic respiratory failure (HCC) [J96.20]  Diabetic ketoacidosis without coma associated with diabetes mellitus due to underlying condition (HCC) [E08.10]  Admit Date:  1/9/2024  Precautions/Restrictions/WB Status/ Lines/ Wounds/ Oxygen: Fall risk, Bed/chair alarm, Lines (IV, Supplemental O2 (4L), and Port left), Telemetry, Continuous pulse oximetry, and Isolation Precautions: Contact    Pt seen for cotreatment this date due to patient safety, patient endurance, complexity of condition, and acute illness/injury    Treatment Time:  14:00-14:37  Treatment Number:  1  Timed Code Treatment Minutes: 27 minutes  Total Treatment Minutes:  37  minutes    Patient Goals for Therapy: \"to get stronger \"          Discharge Recommendations: SNF  DME needs for discharge: Defer to facility       Therapy recommendations for staff:   Assist of 1 for transfers with use of rolling walker (RW) and gait belt to/from chair/BSC    History of Present Illness: 53 y.o. male with pmhx of ESRD on HD, chronic hypoxic respiratory failure, CAD,CHF, COPD, hx of VTE, DM type 2, ZAINAB who presented to [facility] ED with complaint of shortness of breath and missed dialysis      Home Health S4 Level Recommendation:  NA    AM-PAC Score: AM-PAC Inpatient Daily Activity Raw Score: 15     Subjective:  Patient lying reclined in bed with no family present.   Pt agreeable to this OT session.     Cognition:    A&O x4   Able to follow 2 step commands    Pain:   No  Location:   Rating: NA /10  Pain Medicine Status: Denies need    Preadmission Environment:   Pt lives with                                         Spouse  Home environment:                            mobile home/trailer  Steps to enter first floor:

## 2024-01-12 NOTE — DIALYSIS
Treatment time: 3 hours  Net UF:  2000  ml     Pre weight: 89.7 kg  Post weight:  87.7  kg  EDW: 94 kg, challenging      Access used: L TDC    Access function: well with  ml/min     Medications or blood products given: Heparin Dwells, albumin 25gms for Bp support and retacrit 10,000 units as ordered      Regular outpatient schedule: BETTY Lucas     Summary of response to treatment: Patient tolerated treatment poorly due to dizzyness unaccompanied by hypotension or change in heart rhythem or rate.  Dr. Marcial made aware, albumin ordered to ensure 2L net UF. BS checked by floor RN at 135.     Report given to  Brittany Barrientos RN and copy of dialysis treatment record placed in chart, to be scanned into EMR.

## 2024-01-12 NOTE — CARE COORDINATION
10:32 - Received call from Agueda/ANABELL. Agueda is trying to secure a chair through [employer]. ETHAN advised pt is ready for DC. Agueda will let Demond know the patient is ready for DC.    Need PT recs.   PT here but patient is in dialysis. They will be back at 1:00.   Agueda will wait for PT recs and advises [employer] usually responds in one hour.     Dr. Lawrence wants to be notified via [employer] when a plan is in place.     Dr. Batres asked where the patient is discharging to. ETHAN advised [facility]. Dr. Batres stated the patient usually goes to [facility]. ETHAN advised the patient does not want to go to [facility].    Received a call from Dr. Batres advising he spoke to the patient in dialysis and the patient stated he wants to go to [facility].     ETHAN contacted [facility] and spoke to [facility]. [facility] states patient is okay to return to [facility]. [facility] will check into dialysis.    Patient back from dialysis. ETHAN spoke to the patient and asked where he was planning to discharge to. ETHAN reminded the patient that he requested [facility] yesterday.  The patient advised yes [facility] is fine. ETHAN stated you told Dr. Batres you wanted to go to [facility]. The patient advised he said he was going to \"wherever he said he was going\" which he now knows is [facility].     Spoke with Agueda - [facility] is set up for the patient and will order bipap. Waiting on OT/PT for recs.    CM contacted OT/PT - they will be over to work with the patient in ten minutes.     Spoke with Mady at [facility]. Mady advised she will follow in case the patient changes his mind again.     Received okay from [facility] to start precert.  OT/PT notes in.  2:50 - Skylar/CM Manager starting precert.

## 2024-01-12 NOTE — PROGRESS NOTES
Admit: 2024    Name:  Igor Ann  Room:  [address] [place] [address]  MRN:    [id number]    Critical Care Daily Progress Note for 2024   Admitted with acute on chronic resp failure and fluid overload    Interval History:   Transferred to ICU for increased work of breathing  Placed on bipap  Feels better        Placed on precedex to tolerate BiPAP  He refuses to come off bipap       Used bipap at night   Now on 4 L     Scheduled Meds:   traZODone  50 mg Oral Nightly    ipratropium 0.5 mg-albuterol 2.5 mg  1 Dose Inhalation TID    metoprolol succinate  25 mg Oral Daily    sacubitril-valsartan  0.5 tablet Oral BID    insulin glargine  10 Units SubCUTAneous Nightly    insulin lispro  0-4 Units SubCUTAneous Q4H    insulin lispro  0-4 Units SubCUTAneous Once    mupirocin   Each Nostril BID    epoetin siddharth-epbx  10,000 Units IntraVENous Once per day on     apixaban  5 mg Oral BID    busPIRone  10 mg Oral TID    clopidogrel  75 mg Oral Daily    DULoxetine  60 mg Oral Daily    pravastatin  40 mg Oral QHS    torsemide  100 mg Oral Daily    QUEtiapine  50 mg Oral Nightly    sodium chloride flush  5-40 mL IntraVENous 2 times per day       Continuous Infusions:   sodium chloride      dextrose         PRN Meds:  ipratropium 0.5 mg-albuterol 2.5 mg, heparin (porcine), sodium chloride flush, sodium chloride, ondansetron **OR** ondansetron, polyethylene glycol, acetaminophen **OR** acetaminophen, labetalol, glucose, dextrose bolus **OR** dextrose bolus, glucagon (rDNA), dextrose, dextrose bolus **OR** dextrose bolus, hydrALAZINE                  Objective:     Temp  Av.8 °F (36.6 °C)  Min: 97.3 °F (36.3 °C)  Max: 98.4 °F (36.9 °C)  Pulse  Av.2  Min: 68  Max: 92  BP  Min: 106/56  Max: 174/92  SpO2  Av.7 %  Min: 94 %  Max: 100 %  FiO2   Av.7 %  Min: 25 %  Max: 30 %  Patient Vitals for the past 4 hrs:   BP Temp Temp src Pulse Resp SpO2 Weight   24 0700 (!) 133/49 -- -- 80 18 -- --   24

## 2024-01-12 NOTE — PROGRESS NOTES
Pulmonary Progress Note  CC: Acute on chronic resp failure    Subjective:  pt refused to come off of Bipap to take Lokelma. O2 improve overnight.  Precedex started for anxiety yesterday    EXAM: /78   Pulse 83   Temp 97.1 °F (36.2 °C) (Temporal)   Resp 18   Ht 1.753 m (5' 9\")   Wt 89.7 kg (197 lb 11.2 oz)   SpO2 98%   BMI 29.20 kg/m²  on Bipap  Constitutional:  No acute distress.   Eyes: PERRL. Conjunctivae anicteric.   ENT: Normal nose. Normal tongue.    Neck:  Trachea is midline.   Respiratory: No accessory muscle usage.  Decreased breath sounds. No wheezes. + rales. No Rhonchi.  Cardiovascular: Normal S1S2. No digit clubbing. No digit cyanosis. No LE edema.   Psychiatric: No anxiety or Agitation. Alert and Oriented to person, place and time.    Scheduled Meds:   traZODone  50 mg Oral Nightly    ipratropium 0.5 mg-albuterol 2.5 mg  1 Dose Inhalation TID    metoprolol succinate  25 mg Oral Daily    sacubitril-valsartan  0.5 tablet Oral BID    insulin glargine  10 Units SubCUTAneous Nightly    insulin lispro  0-4 Units SubCUTAneous Q4H    insulin lispro  0-4 Units SubCUTAneous Once    mupirocin   Each Nostril BID    epoetin siddharth-epbx  10,000 Units IntraVENous Once per day on Mon Wed Fri    apixaban  5 mg Oral BID    busPIRone  10 mg Oral TID    clopidogrel  75 mg Oral Daily    DULoxetine  60 mg Oral Daily    pravastatin  40 mg Oral QHS    torsemide  100 mg Oral Daily    QUEtiapine  50 mg Oral Nightly    sodium chloride flush  5-40 mL IntraVENous 2 times per day     Continuous Infusions:   sodium chloride      dextrose       PRN Meds:  ipratropium 0.5 mg-albuterol 2.5 mg, heparin (porcine), sodium chloride flush, sodium chloride, ondansetron **OR** ondansetron, polyethylene glycol, acetaminophen **OR** acetaminophen, labetalol, glucose, dextrose bolus **OR** dextrose bolus, glucagon (rDNA), dextrose, dextrose bolus **OR** dextrose bolus, hydrALAZINE    Labs:  CBC:   Recent Labs     01/10/24  0410

## 2024-01-12 NOTE — PLAN OF CARE
Problem: Discharge Planning  Goal: Discharge to home or other facility with appropriate resources  Outcome: Progressing     Problem: Pain  Goal: Verbalizes/displays adequate comfort level or baseline comfort level  Outcome: Progressing     Problem: Safety - Adult  Goal: Free from fall injury  Outcome: Progressing     Problem: Respiratory - Adult  Goal: Achieves optimal ventilation and oxygenation  Outcome: Progressing     Problem: Cardiovascular - Adult  Goal: Maintains optimal cardiac output and hemodynamic stability  Outcome: Progressing  Goal: Absence of cardiac dysrhythmias or at baseline  Outcome: Progressing     Problem: Musculoskeletal - Adult  Goal: Return mobility to safest level of function  Outcome: Progressing  Goal: Maintain proper alignment of affected body part  Outcome: Progressing  Goal: Return ADL status to a safe level of function  Outcome: Progressing     Problem: Gastrointestinal - Adult  Goal: Minimal or absence of nausea and vomiting  Outcome: Progressing  Goal: Maintains or returns to baseline bowel function  Outcome: Progressing  Goal: Maintains adequate nutritional intake  Outcome: Progressing  Goal: Establish and maintain optimal ostomy function  Outcome: Progressing     Problem: Genitourinary - Adult  Goal: Absence of urinary retention  Outcome: Progressing  Goal: Urinary catheter remains patent  Outcome: Progressing     Problem: Chronic Conditions and Co-morbidities  Goal: Patient's chronic conditions and co-morbidity symptoms are monitored and maintained or improved  Outcome: Progressing     Problem: Skin/Tissue Integrity  Goal: Absence of new skin breakdown  Description: 1.  Monitor for areas of redness and/or skin breakdown  2.  Assess vascular access sites hourly  3.  Every 4-6 hours minimum:  Change oxygen saturation probe site  4.  Every 4-6 hours:  If on nasal continuous positive airway pressure, respiratory therapy assess nares and determine need for appliance change or resting  period.  Outcome: Progressing

## 2024-01-13 LAB
ANION GAP SERPL CALCULATED.3IONS-SCNC: 16 MMOL/L (ref 3–16)
BUN SERPL-MCNC: 55 MG/DL (ref 7–20)
CALCIUM SERPL-MCNC: 8.7 MG/DL (ref 8.3–10.6)
CHLORIDE SERPL-SCNC: 100 MMOL/L (ref 99–110)
CO2 SERPL-SCNC: 22 MMOL/L (ref 21–32)
CREAT SERPL-MCNC: 5.8 MG/DL (ref 0.9–1.3)
GFR SERPLBLD CREATININE-BSD FMLA CKD-EPI: 11 ML/MIN/{1.73_M2}
GLUCOSE BLD-MCNC: 103 MG/DL (ref 70–99)
GLUCOSE BLD-MCNC: 138 MG/DL (ref 70–99)
GLUCOSE BLD-MCNC: 151 MG/DL (ref 70–99)
GLUCOSE BLD-MCNC: 172 MG/DL (ref 70–99)
GLUCOSE BLD-MCNC: 179 MG/DL (ref 70–99)
GLUCOSE BLD-MCNC: 98 MG/DL (ref 70–99)
GLUCOSE SERPL-MCNC: 115 MG/DL (ref 70–99)
PERFORMED ON: ABNORMAL
PERFORMED ON: NORMAL
POTASSIUM SERPL-SCNC: 4.6 MMOL/L (ref 3.5–5.1)
SODIUM SERPL-SCNC: 138 MMOL/L (ref 136–145)

## 2024-01-13 PROCEDURE — 6370000000 HC RX 637 (ALT 250 FOR IP)

## 2024-01-13 PROCEDURE — 99232 SBSQ HOSP IP/OBS MODERATE 35: CPT | Performed by: INTERNAL MEDICINE

## 2024-01-13 PROCEDURE — 6370000000 HC RX 637 (ALT 250 FOR IP): Performed by: INTERNAL MEDICINE

## 2024-01-13 PROCEDURE — 36415 COLL VENOUS BLD VENIPUNCTURE: CPT

## 2024-01-13 PROCEDURE — 99233 SBSQ HOSP IP/OBS HIGH 50: CPT | Performed by: INTERNAL MEDICINE

## 2024-01-13 PROCEDURE — 94640 AIRWAY INHALATION TREATMENT: CPT

## 2024-01-13 PROCEDURE — 2700000000 HC OXYGEN THERAPY PER DAY

## 2024-01-13 PROCEDURE — 1200000000 HC SEMI PRIVATE

## 2024-01-13 PROCEDURE — 80048 BASIC METABOLIC PNL TOTAL CA: CPT

## 2024-01-13 PROCEDURE — 94761 N-INVAS EAR/PLS OXIMETRY MLT: CPT

## 2024-01-13 PROCEDURE — 2580000003 HC RX 258

## 2024-01-13 PROCEDURE — 94660 CPAP INITIATION&MGMT: CPT

## 2024-01-13 RX ORDER — IPRATROPIUM BROMIDE AND ALBUTEROL SULFATE 2.5; .5 MG/3ML; MG/3ML
1 SOLUTION RESPIRATORY (INHALATION)
Status: DISCONTINUED | OUTPATIENT
Start: 2024-01-13 | End: 2024-01-15 | Stop reason: HOSPADM

## 2024-01-13 RX ADMIN — Medication 10 ML: at 20:27

## 2024-01-13 RX ADMIN — METOPROLOL SUCCINATE 25 MG: 25 TABLET, EXTENDED RELEASE ORAL at 08:42

## 2024-01-13 RX ADMIN — Medication 10 ML: at 08:43

## 2024-01-13 RX ADMIN — INSULIN GLARGINE 10 UNITS: 100 INJECTION, SOLUTION SUBCUTANEOUS at 20:25

## 2024-01-13 RX ADMIN — PRAVASTATIN SODIUM 40 MG: 40 TABLET ORAL at 20:25

## 2024-01-13 RX ADMIN — BUSPIRONE HYDROCHLORIDE 10 MG: 10 TABLET ORAL at 14:02

## 2024-01-13 RX ADMIN — DULOXETINE HYDROCHLORIDE 60 MG: 60 CAPSULE, DELAYED RELEASE ORAL at 08:41

## 2024-01-13 RX ADMIN — MUPIROCIN: 20 OINTMENT TOPICAL at 20:26

## 2024-01-13 RX ADMIN — TRAZODONE HYDROCHLORIDE 50 MG: 50 TABLET ORAL at 20:25

## 2024-01-13 RX ADMIN — IPRATROPIUM BROMIDE AND ALBUTEROL SULFATE 1 DOSE: 2.5; .5 SOLUTION RESPIRATORY (INHALATION) at 07:28

## 2024-01-13 RX ADMIN — BUSPIRONE HYDROCHLORIDE 10 MG: 10 TABLET ORAL at 20:25

## 2024-01-13 RX ADMIN — SACUBITRIL AND VALSARTAN 1 TABLET: 24; 26 TABLET, FILM COATED ORAL at 08:42

## 2024-01-13 RX ADMIN — CLOPIDOGREL BISULFATE 75 MG: 75 TABLET ORAL at 08:42

## 2024-01-13 RX ADMIN — BUSPIRONE HYDROCHLORIDE 10 MG: 10 TABLET ORAL at 08:42

## 2024-01-13 RX ADMIN — IPRATROPIUM BROMIDE AND ALBUTEROL SULFATE 1 DOSE: 2.5; .5 SOLUTION RESPIRATORY (INHALATION) at 19:21

## 2024-01-13 RX ADMIN — SACUBITRIL AND VALSARTAN 1 TABLET: 24; 26 TABLET, FILM COATED ORAL at 20:25

## 2024-01-13 RX ADMIN — SPIRONOLACTONE 12.5 MG: 25 TABLET ORAL at 08:41

## 2024-01-13 RX ADMIN — MUPIROCIN: 20 OINTMENT TOPICAL at 08:44

## 2024-01-13 RX ADMIN — APIXABAN 5 MG: 5 TABLET, FILM COATED ORAL at 08:42

## 2024-01-13 RX ADMIN — APIXABAN 5 MG: 5 TABLET, FILM COATED ORAL at 20:25

## 2024-01-13 RX ADMIN — TORSEMIDE 100 MG: 100 TABLET ORAL at 08:42

## 2024-01-13 RX ADMIN — QUETIAPINE FUMARATE 50 MG: 25 TABLET ORAL at 20:25

## 2024-01-13 ASSESSMENT — PAIN SCALES - GENERAL
PAINLEVEL_OUTOF10: 0

## 2024-01-13 NOTE — PROGRESS NOTES
RT Inhaler-Nebulizer Bronchodilator Protocol Note    There is a bronchodilator order in the chart from a provider indicating to follow the RT Bronchodilator Protocol and there is an “Initiate RT Inhaler-Nebulizer Bronchodilator Protocol” order as well (see protocol at bottom of note).    CXR Findings:  No results found.    The findings from the last RT Protocol Assessment were as follows:   History Pulmonary Disease: (P) Chronic pulmonary disease  Respiratory Pattern: (P) Regular pattern and RR 12-20 bpm  Breath Sounds: (P) Intermittent or unilateral wheezes  Cough: (P) Strong, spontaneous, non-productive  Indication for Bronchodilator Therapy: (P) Wheezing associated with pulm disorder  Bronchodilator Assessment Score: (P) 6    Aerosolized bronchodilator medication orders have been revised according to the RT Inhaler-Nebulizer Bronchodilator Protocol below.    Respiratory Therapist to perform RT Therapy Protocol Assessment initially then follow the protocol.  Repeat RT Therapy Protocol Assessment PRN for score 0-3 or on second treatment, BID, and PRN for scores above 3.    No Indications - adjust the frequency to every 6 hours PRN wheezing or bronchospasm, if no treatments needed after 48 hours then discontinue using Per Protocol order mode.     If indication present, adjust the RT bronchodilator orders based on the Bronchodilator Assessment Score as indicated below.  Use Inhaler orders unless patient has one or more of the following: on home nebulizer, not able to hold breath for 10 seconds, is not alert and oriented, cannot activate and use MDI correctly, or respiratory rate 25 breaths per minute or more, then use the equivalent nebulizer order(s) with same Frequency and PRN reasons based on the score.  If a patient is on this medication at home then do not decrease Frequency below that used at home.    0-3 - enter or revise RT bronchodilator order(s) to equivalent RT Bronchodilator order with Frequency of every

## 2024-01-13 NOTE — PLAN OF CARE
Problem: Discharge Planning  Goal: Discharge to home or other facility with appropriate resources  Outcome: Progressing     Problem: Pain  Goal: Verbalizes/displays adequate comfort level or baseline comfort level  Outcome: Progressing     Problem: Safety - Adult  Goal: Free from fall injury  Outcome: Progressing     Problem: Respiratory - Adult  Goal: Achieves optimal ventilation and oxygenation  Outcome: Progressing     Problem: Cardiovascular - Adult  Goal: Maintains optimal cardiac output and hemodynamic stability  Outcome: Progressing     Problem: Cardiovascular - Adult  Goal: Absence of cardiac dysrhythmias or at baseline  Outcome: Progressing     Problem: Musculoskeletal - Adult  Goal: Return mobility to safest level of function  Outcome: Progressing     Problem: Musculoskeletal - Adult  Goal: Return ADL status to a safe level of function  Outcome: Progressing     Problem: Gastrointestinal - Adult  Goal: Minimal or absence of nausea and vomiting  Outcome: Progressing     Problem: Chronic Conditions and Co-morbidities  Goal: Patient's chronic conditions and co-morbidity symptoms are monitored and maintained or improved  Outcome: Progressing     Problem: Skin/Tissue Integrity  Goal: Absence of new skin breakdown  Outcome: Progressing

## 2024-01-13 NOTE — PROGRESS NOTES
[facility]   Progress Note  Cardiology    CC: sob    HPI: breathing about the same. No chest pain     Past Medical History   has a past medical history of Ambulatory dysfunction, Aortic stenosis, Arthritis, Asthma, Bilateral hilar adenopathy syndrome, CAD (coronary artery disease), Cardiomyopathy ([facility]), CHF (congestive heart failure) ([facility]), Chronic pain, COPD (chronic obstructive pulmonary disease) ([facility]), CVA (cerebral vascular accident) ([facility]), Depression, Diabetes mellitus (HCC), Difficult intravenous access, Emphysema of lung (HCC), ESRD (end stage renal disease) on dialysis ([facility]), Fear of needles, Gastric ulcer, GERD (gastroesophageal reflux disease), Heart valve problem, Hemodialysis patient ([facility]), History of spinal fracture, Hx of blood clots, Hyperlipidemia, Hypertension, MI (myocardial infarction) ([facility]), Neuromuscular disorder ([facility]), Numbness and tingling in left arm, Pneumonia, PONV (postoperative nausea and vomiting), Prolonged emergence from general anesthesia, Sleep apnea, Stroke ([facility]), TIA (transient ischemic attack), and Unspecified diseases of blood and blood-forming organs.    Past Surgical History   has a past surgical history that includes Tonsillectomy; cyst removal (08/14/2013); Colonoscopy; Coronary angioplasty with stent (05/26/15); vascular surgery (aprx 2 years ago); Colonoscopy (2/29/2015); Upper gastrointestinal endoscopy (01/06/2016); Upper gastrointestinal endoscopy (01/29/2017); other surgical history (02/01/2017); Dialysis fistula creation (Left, 10/30/2017); Diagnostic Cardiac Cath Lab Procedure; other surgical history (2018); other surgical history (Bilateral, 06/26/2018); pr laps insertion tunneled intraperitoneal catheter (N/A, 9/21/2018); other surgical history (Right, 09/2018); Dialysis fistula creation (Left, 3/27/2019); other surgical history (05/28/2019); Endoscopy, colon, diagnostic; Catheter Removal (Right, 7/2/2019); Aortic valve replacement (N/A, 10/15/2019);  Provider, MD Gerson   DULoxetine (CYMBALTA) 60 MG extended release capsule Take 1 capsule by mouth daily 3/17/23   Jackie Krause MD   pravastatin (PRAVACHOL) 40 MG tablet TAKE 1 TABLET BY MOUTH DAILY 2/7/23   Colleen Herrera APRN - CNP   clopidogrel (PLAVIX) 75 MG tablet TAKE 1 TABLET BY MOUTH DAILY 2/7/23   Colleen Herrera APRN - CNP   QUEtiapine (SEROQUEL) 50 MG tablet TAKE 1 TABLET BY MOUTH EVERY EVENING 6/30/22   Salvador Lugo MD   Calcium Acetate, Phos Binder, 667 MG CAPS TAKE 1 CAPSULE BY MOUTH THREE TIMES DAILY WITH MEALS 8/12/21   Salvador Lugo MD   ipratropium-albuterol (DUONEB) 0.5-2.5 (3) MG/3ML SOLN nebulizer solution Inhale 3 mLs into the lungs every 6 hours as needed for Shortness of Breath 10/15/17   Akash Lo MD       Allergies  Morphine    Review of Systems:   Reviewed. No changes except as noted in HPI and A/P      Lab Results   Component Value Date    WBC 10.3 01/12/2024    HGB 11.0 (L) 01/12/2024    HCT 33.6 (L) 01/12/2024    MCV 93.8 01/12/2024     01/12/2024     Lab Results   Component Value Date    CREATININE 5.8 (HH) 01/13/2024    BUN 55 (H) 01/13/2024     01/13/2024    K 4.6 01/13/2024     01/13/2024    CO2 22 01/13/2024    BNP 72 09/09/2013     Lab Results   Component Value Date    INR 1.05 01/02/2024    PROTIME 13.7 01/02/2024       I reviewed EKGs and radiology imaging, including CXR, Echo, and recent CT chest. Pertinent findings and changes as described in assessment below.      Physical Examination:    /63   Pulse 90   Temp 97.5 °F (36.4 °C) (Temporal)   Resp 18   Ht 1.753 m (5' 9\")   Wt 90 kg (198 lb 6.6 oz)   SpO2 95%   BMI 29.30 kg/m²      General Appearance:  Alert, no distress, appears stated age   Head:  Normocephalic, without obvious abnormality, atraumatic   Eyes:  PERRL, conjunctiva/corneas clear         Nose: Nares normal, no drainage or sinus tenderness   Throat: Lips, mucosa, and tongue normal   Neck: Supple,

## 2024-01-13 NOTE — PROGRESS NOTES
01/13/24 0333   NIV Type   Equipment Type v60   Mode Bilevel   Mask Type Full face mask   Mask Size Large   Assessment   Pulse 78   Settings/Measurements   IPAP 14 cmH20   CPAP/EPAP 6 cmH2O   Vt (Measured) 531 mL   Rate Ordered 16   FiO2  25 %   Minute Volume (L/min) 9.9 Liters   Mask Leak (lpm) 0 lpm   Patient's Home Machine No   Alarm Settings   Alarms On Y

## 2024-01-13 NOTE — PROGRESS NOTES
01/12/24 1900   RT Protocol   History Pulmonary Disease 2   Respiratory pattern 0   Breath sounds 2   Cough 0   Indications for Bronchodilator Therapy Decreased or absent breath sounds   Bronchodilator Assessment Score 4     RT Inhaler-Nebulizer Bronchodilator Protocol Note    There is a bronchodilator order in the chart from a provider indicating to follow the RT Bronchodilator Protocol and there is an “Initiate RT Inhaler-Nebulizer Bronchodilator Protocol” order as well (see protocol at bottom of note).    CXR Findings:  No results found.    The findings from the last RT Protocol Assessment were as follows:   History Pulmonary Disease: Chronic pulmonary disease  Respiratory Pattern: Regular pattern and RR 12-20 bpm  Breath Sounds: Slightly diminished and/or crackles  Cough: Strong, spontaneous, non-productive  Indication for Bronchodilator Therapy: Decreased or absent breath sounds  Bronchodilator Assessment Score: 4    Aerosolized bronchodilator medication orders have been revised according to the RT Inhaler-Nebulizer Bronchodilator Protocol below.    Respiratory Therapist to perform RT Therapy Protocol Assessment initially then follow the protocol.  Repeat RT Therapy Protocol Assessment PRN for score 0-3 or on second treatment, BID, and PRN for scores above 3.    No Indications - adjust the frequency to every 6 hours PRN wheezing or bronchospasm, if no treatments needed after 48 hours then discontinue using Per Protocol order mode.     If indication present, adjust the RT bronchodilator orders based on the Bronchodilator Assessment Score as indicated below.  Use Inhaler orders unless patient has one or more of the following: on home nebulizer, not able to hold breath for 10 seconds, is not alert and oriented, cannot activate and use MDI correctly, or respiratory rate 25 breaths per minute or more, then use the equivalent nebulizer order(s) with same Frequency and PRN reasons based on the score.  If a patient

## 2024-01-13 NOTE — PROGRESS NOTES
Pulmonary Progress Note  CC: Acute on chronic resp failure    Subjective:  tolerate coming off of Bipap for HD    EXAM: BP (!) 144/83   Pulse 84   Temp 97.5 °F (36.4 °C) (Temporal)   Resp 18   Ht 1.753 m (5' 9\")   Wt 90 kg (198 lb 6.6 oz)   SpO2 98%   BMI 29.30 kg/m²  on Bipap  Constitutional:  No acute distress.   Eyes: PERRL. Conjunctivae anicteric.   ENT: Normal nose. Normal tongue.    Neck:  Trachea is midline.   Respiratory: No accessory muscle usage.  Decreased breath sounds. No wheezes. + rales. No Rhonchi.  Cardiovascular: Normal S1S2. No digit clubbing. No digit cyanosis. No LE edema.   Psychiatric: No anxiety or Agitation. Alert and Oriented to person, place and time.    Scheduled Meds:   ipratropium 0.5 mg-albuterol 2.5 mg  1 Dose Inhalation BID RT    sacubitril-valsartan  1 tablet Oral BID    spironolactone  12.5 mg Oral Daily    traZODone  50 mg Oral Nightly    metoprolol succinate  25 mg Oral Daily    insulin glargine  10 Units SubCUTAneous Nightly    insulin lispro  0-4 Units SubCUTAneous Q4H    insulin lispro  0-4 Units SubCUTAneous Once    mupirocin   Each Nostril BID    apixaban  5 mg Oral BID    busPIRone  10 mg Oral TID    clopidogrel  75 mg Oral Daily    DULoxetine  60 mg Oral Daily    pravastatin  40 mg Oral QHS    torsemide  100 mg Oral Daily    QUEtiapine  50 mg Oral Nightly    sodium chloride flush  5-40 mL IntraVENous 2 times per day     Continuous Infusions:   sodium chloride      dextrose       PRN Meds:  ipratropium 0.5 mg-albuterol 2.5 mg, heparin (porcine), sodium chloride flush, sodium chloride, ondansetron **OR** ondansetron, polyethylene glycol, acetaminophen **OR** acetaminophen, labetalol, glucose, dextrose bolus **OR** dextrose bolus, glucagon (rDNA), dextrose, dextrose bolus **OR** dextrose bolus, hydrALAZINE    Labs:  CBC:   Recent Labs     01/11/24  0422 01/12/24  0426   WBC 8.7 10.3   HGB 9.8* 11.0*   HCT 30.4* 33.6*   MCV 94.6 93.8    167       BMP:   Recent

## 2024-01-13 NOTE — PROGRESS NOTES
01/12/24 2330   NIV Type   Equipment Type v60   Mode Bilevel   Mask Type Full face mask   Mask Size Large   Assessment   Pulse 86   Settings/Measurements   IPAP 14 cmH20   CPAP/EPAP 6 cmH2O   Vt (Measured) 544 mL   Rate Ordered 16   FiO2  25 %   Minute Volume (L/min) 15.4 Liters   Mask Leak (lpm) 2 lpm   Patient's Home Machine No   Alarm Settings   Alarms On Y

## 2024-01-13 NOTE — PROGRESS NOTES
Admit: 2024    Name:  Igor Ann  Room:  [address] [place] [address]  MRN:    [id number]    Critical Care Daily Progress Note for 2024   Admitted with acute on chronic resp failure and fluid overload    Interval History:   Transferred to ICU for increased work of breathing  Placed on bipap  Feels better        Placed on precedex to tolerate BiPAP  He refuses to come off bipap       Used bipap at night   Now on 4 L     - awaiting pre cert. On oxygen. Having some dyspnea.     Scheduled Meds:   ipratropium 0.5 mg-albuterol 2.5 mg  1 Dose Inhalation BID RT    sacubitril-valsartan  1 tablet Oral BID    spironolactone  12.5 mg Oral Daily    traZODone  50 mg Oral Nightly    metoprolol succinate  25 mg Oral Daily    insulin glargine  10 Units SubCUTAneous Nightly    insulin lispro  0-4 Units SubCUTAneous Q4H    insulin lispro  0-4 Units SubCUTAneous Once    mupirocin   Each Nostril BID    apixaban  5 mg Oral BID    busPIRone  10 mg Oral TID    clopidogrel  75 mg Oral Daily    DULoxetine  60 mg Oral Daily    pravastatin  40 mg Oral QHS    torsemide  100 mg Oral Daily    QUEtiapine  50 mg Oral Nightly    sodium chloride flush  5-40 mL IntraVENous 2 times per day       Continuous Infusions:   sodium chloride      dextrose         PRN Meds:  ipratropium 0.5 mg-albuterol 2.5 mg, heparin (porcine), sodium chloride flush, sodium chloride, ondansetron **OR** ondansetron, polyethylene glycol, acetaminophen **OR** acetaminophen, labetalol, glucose, dextrose bolus **OR** dextrose bolus, glucagon (rDNA), dextrose, dextrose bolus **OR** dextrose bolus, hydrALAZINE       Objective:     Temp  Av.8 °F (36.6 °C)  Min: 97.5 °F (36.4 °C)  Max: 98.2 °F (36.8 °C)  Pulse  Av  Min: 78  Max: 104  BP  Min: 87/57  Max: 156/93  SpO2  Av.8 %  Min: 93 %  Max: 100 %  FiO2   Av %  Min: 25 %  Max: 25 %  Patient Vitals for the past 4 hrs:   BP Temp Temp src Pulse SpO2   24 1515 -- -- -- 82 100 %   24 1500 137/77  SSI      #Chronic normocytic anemia   -denies any s/s of bleeding  -likely 2/2 to renal disease   -monitor CBC      #COPD without AE  -continue prn inhalers      #Hx of VTE   -on eliquis      #HX of CVA   -plavix, statin      #Depression   #Severe anxiety leading to recurrent admissions  - on buspar, cymbalta  - may need psychiatry consult     #ZAINAB   -uses CPAP nightly        DVT Prophylaxis: Eliquis   Diet: gen diet   Code Status: Full Code    Awaiting pre cert.      SERGIO GEE MD

## 2024-01-13 NOTE — PROGRESS NOTES
Department of Internal Medicine  Nephrology Progress Note        SUBJECTIVE:    We are following this patient for ESRD management  The patient was seen and examined; he feels well today with no CP, SOB, nausea or vomiting.    ROS: No fever or chills.  Social: No family at bedside.      Physical Exam:    VITALS:  BP (!) 141/87   Pulse 84   Temp 97.7 °F (36.5 °C) (Temporal)   Resp 18   Ht 1.753 m (5' 9\")   Wt 90 kg (198 lb 6.6 oz)   SpO2 96%   BMI 29.30 kg/m²     General appearance: Seems comfortable, no acute distress.  Neck: Trachea midline, thyroid normal.   Lungs:  Non labored breathing, CTA to anterior auscultation.  Heart:  S1S2 normal, rub or gallop. No peripheral edema.  Abdomen: Soft, non-tender, no organomegaly.   Skin: No lesions or rashes, warm to touch.     DATA:    CBC with Differential:    Lab Results   Component Value Date/Time    WBC 10.3 01/12/2024 04:26 AM    RBC 3.58 01/12/2024 04:26 AM    HGB 11.0 01/12/2024 04:26 AM    HCT 33.6 01/12/2024 04:26 AM     01/12/2024 04:26 AM    MCV 93.8 01/12/2024 04:26 AM    MCH 30.6 01/12/2024 04:26 AM    MCHC 32.6 01/12/2024 04:26 AM    RDW 20.5 01/12/2024 04:26 AM    SEGSPCT 73.4 05/17/2013 06:50 AM    BANDSPCT 5 01/11/2024 04:22 AM    METASPCT 2 03/03/2022 07:41 AM    LYMPHOPCT 7.5 01/12/2024 04:26 AM    MONOPCT 7.6 01/12/2024 04:26 AM    MYELOPCT 2 03/16/2023 04:45 AM    BASOPCT 0.5 01/12/2024 04:26 AM    MONOSABS 0.8 01/12/2024 04:26 AM    LYMPHSABS 0.8 01/12/2024 04:26 AM    EOSABS 0.2 01/12/2024 04:26 AM    BASOSABS 0.1 01/12/2024 04:26 AM    DIFFTYPE Auto 05/17/2013 06:50 AM     BMP:    Lab Results   Component Value Date/Time     01/13/2024 04:50 AM    K 4.6 01/13/2024 04:50 AM     01/13/2024 04:50 AM    CO2 22 01/13/2024 04:50 AM    BUN 55 01/13/2024 04:50 AM    LABALBU 4.1 01/09/2024 07:41 PM    CREATININE 5.8 01/13/2024 04:50 AM    CALCIUM 8.7 01/13/2024 04:50 AM    GFRAA 10 10/05/2022 11:53 AM    GFRAA >60 05/17/2013 06:50 AM     LABGLOM 11 01/13/2024 04:50 AM    GLUCOSE 115 01/13/2024 04:50 AM       IMPRESSION/RECOMMENDATIONS:      1- ESRD: Hemodialysis per MWF schedule with further fluid removal.     2- No significant electrolytes disorders noted.     3- HTN: Blood pressure within acceptable range.     4- Anemia: Holding DAVON due to rising hemoglobin.

## 2024-01-13 NOTE — PROGRESS NOTES
01/12/24 2052   NIV Type   NIV Started/Stopped (S)  On   Equipment Type v60   Mode Bilevel   Mask Type Full face mask   Mask Size Large   Assessment   Pulse 94   SpO2 97 %   Comfort Level Good   Using Accessory Muscles No   Mask Compliance Good   Settings/Measurements   IPAP 14 cmH20   CPAP/EPAP 6 cmH2O   Vt (Measured) 485 mL   Rate Ordered 16   FiO2  25 %   Minute Volume (L/min) 10.4 Liters   Mask Leak (lpm) 0 lpm   Patient's Home Machine No   Alarm Settings   Alarms On Y   Low Pressure (cmH2O) 8 cmH2O   High Pressure (cmH2O) 30 cmH2O   Apnea (secs) 20 secs   RR Low (bpm) 16   RR High (bpm) 40 br/min

## 2024-01-13 NOTE — PROGRESS NOTES
Pt A/O x4, follows commands and responds appropriately. Currently on 4L NC, denies SOB. Rating pain 8/10, but refusing tylenol. No s/s of distress, VSS. See flowsheet and eMar for additional documentation.   General

## 2024-01-14 LAB
GLUCOSE BLD-MCNC: 117 MG/DL (ref 70–99)
GLUCOSE BLD-MCNC: 130 MG/DL (ref 70–99)
GLUCOSE BLD-MCNC: 141 MG/DL (ref 70–99)
GLUCOSE BLD-MCNC: 141 MG/DL (ref 70–99)
GLUCOSE BLD-MCNC: 169 MG/DL (ref 70–99)
GLUCOSE BLD-MCNC: 187 MG/DL (ref 70–99)
PERFORMED ON: ABNORMAL

## 2024-01-14 PROCEDURE — 94761 N-INVAS EAR/PLS OXIMETRY MLT: CPT

## 2024-01-14 PROCEDURE — 97530 THERAPEUTIC ACTIVITIES: CPT

## 2024-01-14 PROCEDURE — 6370000000 HC RX 637 (ALT 250 FOR IP): Performed by: INTERNAL MEDICINE

## 2024-01-14 PROCEDURE — 1200000000 HC SEMI PRIVATE

## 2024-01-14 PROCEDURE — 6370000000 HC RX 637 (ALT 250 FOR IP)

## 2024-01-14 PROCEDURE — 94640 AIRWAY INHALATION TREATMENT: CPT

## 2024-01-14 PROCEDURE — 97110 THERAPEUTIC EXERCISES: CPT

## 2024-01-14 PROCEDURE — 99233 SBSQ HOSP IP/OBS HIGH 50: CPT | Performed by: INTERNAL MEDICINE

## 2024-01-14 PROCEDURE — 2700000000 HC OXYGEN THERAPY PER DAY

## 2024-01-14 PROCEDURE — 94660 CPAP INITIATION&MGMT: CPT

## 2024-01-14 PROCEDURE — 2580000003 HC RX 258

## 2024-01-14 RX ADMIN — Medication 10 ML: at 08:18

## 2024-01-14 RX ADMIN — APIXABAN 5 MG: 5 TABLET, FILM COATED ORAL at 08:18

## 2024-01-14 RX ADMIN — IPRATROPIUM BROMIDE AND ALBUTEROL SULFATE 1 DOSE: 2.5; .5 SOLUTION RESPIRATORY (INHALATION) at 08:51

## 2024-01-14 RX ADMIN — BUSPIRONE HYDROCHLORIDE 10 MG: 10 TABLET ORAL at 21:05

## 2024-01-14 RX ADMIN — SPIRONOLACTONE 12.5 MG: 25 TABLET ORAL at 08:17

## 2024-01-14 RX ADMIN — QUETIAPINE FUMARATE 50 MG: 25 TABLET ORAL at 21:05

## 2024-01-14 RX ADMIN — BUSPIRONE HYDROCHLORIDE 10 MG: 10 TABLET ORAL at 08:18

## 2024-01-14 RX ADMIN — CLOPIDOGREL BISULFATE 75 MG: 75 TABLET ORAL at 08:18

## 2024-01-14 RX ADMIN — INSULIN GLARGINE 10 UNITS: 100 INJECTION, SOLUTION SUBCUTANEOUS at 21:05

## 2024-01-14 RX ADMIN — SACUBITRIL AND VALSARTAN 1 TABLET: 24; 26 TABLET, FILM COATED ORAL at 08:17

## 2024-01-14 RX ADMIN — MUPIROCIN: 20 OINTMENT TOPICAL at 08:18

## 2024-01-14 RX ADMIN — APIXABAN 5 MG: 5 TABLET, FILM COATED ORAL at 21:05

## 2024-01-14 RX ADMIN — TRAZODONE HYDROCHLORIDE 50 MG: 50 TABLET ORAL at 21:05

## 2024-01-14 RX ADMIN — IPRATROPIUM BROMIDE AND ALBUTEROL SULFATE 1 DOSE: 2.5; .5 SOLUTION RESPIRATORY (INHALATION) at 19:34

## 2024-01-14 RX ADMIN — METOPROLOL SUCCINATE 25 MG: 25 TABLET, EXTENDED RELEASE ORAL at 08:17

## 2024-01-14 RX ADMIN — MUPIROCIN: 20 OINTMENT TOPICAL at 21:05

## 2024-01-14 RX ADMIN — BUSPIRONE HYDROCHLORIDE 10 MG: 10 TABLET ORAL at 14:28

## 2024-01-14 RX ADMIN — DULOXETINE HYDROCHLORIDE 60 MG: 60 CAPSULE, DELAYED RELEASE ORAL at 08:17

## 2024-01-14 RX ADMIN — PRAVASTATIN SODIUM 40 MG: 40 TABLET ORAL at 21:04

## 2024-01-14 RX ADMIN — TORSEMIDE 100 MG: 100 TABLET ORAL at 08:19

## 2024-01-14 RX ADMIN — Medication 10 ML: at 21:04

## 2024-01-14 RX ADMIN — SACUBITRIL AND VALSARTAN 1 TABLET: 24; 26 TABLET, FILM COATED ORAL at 21:05

## 2024-01-14 ASSESSMENT — PAIN SCALES - GENERAL
PAINLEVEL_OUTOF10: 0
PAINLEVEL_OUTOF10: 0

## 2024-01-14 NOTE — PROGRESS NOTES

## 2024-01-14 NOTE — PROGRESS NOTES
Temp src Pulse SpO2   01/14/24 1400 129/77 -- -- 76 --   01/14/24 1200 (!) 143/82 97.4 °F (36.3 °C) Temporal 77 97 %           Intake/Output Summary (Last 24 hours) at 1/14/2024 1548  Last data filed at 1/14/2024 1400  Gross per 24 hour   Intake 720 ml   Output 20 ml   Net 700 ml         Physical Exam:  General:  middle aged male, on bipap Awake, alert and oriented. Appears to be not in any distress  Mucous Membranes:  Pink , anicteric  Neck: No JVD, no carotid bruit, no thyromegaly  Chest:  Clear to auscultation bilaterally, minimal basilar crackles  Cardiovascular: RRR S1S2 heard, no murmurs or gallops  Abdomen: Soft, undistended, non tender, no organomegaly, BS present  Extremities: No edema or cyanosis. Distal pulses well felt  Left sided chest tunneled HD catheter  Neurological : grossly normal on bipap - anxious as before  Non focal     Lab Data:  CBC:   Recent Labs     01/12/24  0426   WBC 10.3   RBC 3.58*   HGB 11.0*   HCT 33.6*   MCV 93.8   RDW 20.5*          BMP:   Recent Labs     01/12/24  0425 01/13/24  0450    138   K 4.7 4.6   CL 97* 100   CO2 23 22   BUN 92* 55*   CREATININE 7.2* 5.8*       BNP: No results for input(s): \"BNP\" in the last 72 hours.  PT/INR: No results for input(s): \"PROTIME\", \"INR\" in the last 72 hours.  APTT:No results for input(s): \"APTT\" in the last 72 hours.  CARDIAC ENZYMES: No results for input(s): \"CKMB\", \"CKMBINDEX\", \"TROPONINI\" in the last 72 hours.    Invalid input(s): \"CKTOTAL;3\"  FASTING LIPID PANEL:  Lab Results   Component Value Date/Time    CHOL 138 12/05/2022 09:10 AM    HDL 58 12/05/2022 09:10 AM    TRIG 75 12/05/2022 09:10 AM     LIVER PROFILE:   No results for input(s): \"AST\", \"ALT\", \"ALB\", \"BILIDIR\", \"BILITOT\", \"ALKPHOS\" in the last 72 hours.        XR CHEST PORTABLE   Final Result   Small bilateral pleural effusions with bibasilar airspace disease.  This   could reflect pneumonia in the appropriate clinical setting.             Labs and imaging  disease   -monitor CBC      #COPD without AE  -continue prn inhalers      #Hx of VTE   -on eliquis      #HX of CVA   -plavix, statin      #Depression   #Severe anxiety leading to recurrent admissions  - on buspar, cymbalta  - may need psychiatry consult     #ZAINAB   -uses CPAP nightly        DVT Prophylaxis: Eliquis   Diet: gen diet   Code Status: Full Code    Awaiting pre cert. discharge planning.  Can transfer to PCU.      SERGIO GEE MD

## 2024-01-14 NOTE — PROGRESS NOTES
Inpatient Physical Therapy Treatment    Unit: ICU  Date:  1/14/2024  Patient Name:    Igor Ann  Admitting diagnosis:  Shortness of breath [R06.02]  Acute respiratory failure with hypoxia (HCC) [J96.01]  Acute on chronic respiratory failure (HCC) [J96.20]  Diabetic ketoacidosis without coma associated with diabetes mellitus due to underlying condition (HCC) [E08.10]  Admit Date:  1/9/2024  Precautions/Restrictions/WB Status/ Lines/ Wounds/ Oxygen: Fall risk, Bed/chair alarm, Lines (IV and Supplemental O2 (4L)), No BP/sticks left, Telemetry, Continuous pulse oximetry, and Isolation Precautions: Contact      Pt seen for cotreatment this date due to patient safety and patient endurance    Treatment Time:  8429 - 3791  Treatment Number:  1   Timed Code Treatment Minutes: 23 minutes  Total Treatment Minutes:  23  minutes    Patient Stated Goals for Therapy: \" to go to a SNF \"          Discharge Recommendations: SNF  DME needs for discharge: Defer to facility       Therapy recommendation for EMS Transport: can transport by wheelchair    Therapy recommendations for staff:   Assist of 1 for transfers with use of rolling walker (RW) and gait belt to/from BSC  to/from chair    History of Present Illness:   Per Dr. Hwang (1/9/23):  \"The patient is a 53 y.o. male with pmhx of ESRD on HD, chronic hypoxic respiratory failure, CAD,CHF, COPD, hx of VTE, DM type 2, ZAINAB who presented to [facility] ED with complaint of shortness of breath and missed dialysis   He had 7 visits to ER last month for similar complaints   He was admitted here a week ago and was aggressively diuresed below dry weight for recurrent complaints of sob  He was added on buspar to cymbalta for anxiety but reports he did not take any meds since he left home  \" I have 38 meds and I dont know what to take \"  Comes with high BP above 200 systolic,severe dyspnea and high sugar above 500 with mild DKA symptoms. Reports taking 10 units lantus daily but

## 2024-01-14 NOTE — PROGRESS NOTES
Pulmonary Progress Note  CC: Acute on chronic resp failure    Subjective:  less use of bipap during the day    EXAM: /78   Pulse 78   Temp 97.8 °F (36.6 °C) (Temporal)   Resp 18   Ht 1.753 m (5' 9\")   Wt 89.5 kg (197 lb 4.8 oz)   SpO2 96%   BMI 29.14 kg/m²  on 4L  Constitutional:  No acute distress.   Eyes: PERRL. Conjunctivae anicteric.   ENT: Normal nose. Normal tongue.    Neck:  Trachea is midline.   Respiratory: No accessory muscle usage.  Decreased breath sounds. No wheezes. + rales. No Rhonchi.  Cardiovascular: Normal S1S2. No digit clubbing. No digit cyanosis. No LE edema.   Psychiatric: No anxiety or Agitation. Alert and Oriented to person, place and time.    Scheduled Meds:   ipratropium 0.5 mg-albuterol 2.5 mg  1 Dose Inhalation BID RT    sacubitril-valsartan  1 tablet Oral BID    spironolactone  12.5 mg Oral Daily    traZODone  50 mg Oral Nightly    metoprolol succinate  25 mg Oral Daily    insulin glargine  10 Units SubCUTAneous Nightly    insulin lispro  0-4 Units SubCUTAneous Q4H    insulin lispro  0-4 Units SubCUTAneous Once    mupirocin   Each Nostril BID    apixaban  5 mg Oral BID    busPIRone  10 mg Oral TID    clopidogrel  75 mg Oral Daily    DULoxetine  60 mg Oral Daily    pravastatin  40 mg Oral QHS    torsemide  100 mg Oral Daily    QUEtiapine  50 mg Oral Nightly    sodium chloride flush  5-40 mL IntraVENous 2 times per day     Continuous Infusions:   sodium chloride      dextrose       PRN Meds:  ipratropium 0.5 mg-albuterol 2.5 mg, heparin (porcine), sodium chloride flush, sodium chloride, ondansetron **OR** ondansetron, polyethylene glycol, acetaminophen **OR** acetaminophen, labetalol, glucose, dextrose bolus **OR** dextrose bolus, glucagon (rDNA), dextrose, dextrose bolus **OR** dextrose bolus, hydrALAZINE    Labs:  CBC:   Recent Labs     01/12/24  0426   WBC 10.3   HGB 11.0*   HCT 33.6*   MCV 93.8          BMP:   Recent Labs     01/12/24  0425 01/13/24  0450

## 2024-01-14 NOTE — PROGRESS NOTES
01/14/24 0358   NIV Type   Equipment Type v60   Mode Bilevel   Mask Type Full face mask   Mask Size Large   Assessment   Pulse 81   SpO2 (!) 77 %   Comfort Level Good   Using Accessory Muscles No   Mask Compliance Good   Breath Sounds   Breath Sounds Bilateral Diminished   Settings/Measurements   IPAP 14 cmH20   CPAP/EPAP 6 cmH2O   Vt (Measured) 690 mL   Rate Ordered 16   FiO2  25 %   Minute Volume (L/min) 14.3 Liters   Mask Leak (lpm) 0 lpm   Patient's Home Machine No   Alarm Settings   Alarms On Y

## 2024-01-14 NOTE — PLAN OF CARE
Problem: Pain  Goal: Verbalizes/displays adequate comfort level or baseline comfort level  1/14/2024 0938 by Scott Sams RN  Outcome: Progressing  Flowsheets (Taken 1/10/2024 2227)  Verbalizes/displays adequate comfort level or baseline comfort level: Encourage patient to monitor pain and request assistance  Note: PAIN 0/10  1/14/2024 0558 by Carlos Pate RN  Outcome: Progressing     Problem: Respiratory - Adult  Goal: Achieves optimal ventilation and oxygenation  1/14/2024 0938 by Scott Sams RN  Outcome: Progressing  Flowsheets (Taken 1/14/2024 0938)  Achieves optimal ventilation and oxygenation:   Assess for changes in respiratory status   Assess for changes in mentation and behavior   Position to facilitate oxygenation and minimize respiratory effort   Initiate smoking cessation protocol as indicated   Oxygen supplementation based on oxygen saturation or arterial blood gases  Note: Patient is alert and oriented times 4. Speech is clear. Denied pain and SOB.   1/14/2024 0558 by Carlos Pate RN  Outcome: Progressing  Flowsheets (Taken 1/13/2024 2000)  Achieves optimal ventilation and oxygenation:   Assess for changes in respiratory status   Assess for changes in mentation and behavior   Encourage broncho-pulmonary hygiene including cough, deep breathe, incentive spirometry     Problem: Cardiovascular - Adult  Goal: Maintains optimal cardiac output and hemodynamic stability  1/14/2024 0938 by Scott Sams RN  Outcome: Progressing  Flowsheets (Taken 1/14/2024 0938)  Maintains optimal cardiac output and hemodynamic stability:   Monitor blood pressure and heart rate   Monitor urine output and notify Licensed Independent Practitioner for values outside of normal range   Assess for signs of decreased cardiac output  Note: VS obtained and placed in the chart.  1/14/2024 0558 by Carlos Pate RN  Outcome: Progressing  Flowsheets (Taken 1/13/2024 2000)  Maintains optimal

## 2024-01-14 NOTE — PROGRESS NOTES
RT Inhaler-Nebulizer Bronchodilator Protocol Note    There is a bronchodilator order in the chart from a provider indicating to follow the RT Bronchodilator Protocol and there is an “Initiate RT Inhaler-Nebulizer Bronchodilator Protocol” order as well (see protocol at bottom of note).    CXR Findings:  No results found.    The findings from the last RT Protocol Assessment were as follows:   History Pulmonary Disease: (P) Chronic pulmonary disease  Respiratory Pattern: (P) Regular pattern and RR 12-20 bpm  Breath Sounds: (P) Slightly diminished and/or crackles  Cough: (P) Strong, spontaneous, non-productive  Indication for Bronchodilator Therapy: (P) Decreased or absent breath sounds  Bronchodilator Assessment Score: (P) 4    Aerosolized bronchodilator medication orders have been revised according to the RT Inhaler-Nebulizer Bronchodilator Protocol below.    Respiratory Therapist to perform RT Therapy Protocol Assessment initially then follow the protocol.  Repeat RT Therapy Protocol Assessment PRN for score 0-3 or on second treatment, BID, and PRN for scores above 3.    No Indications - adjust the frequency to every 6 hours PRN wheezing or bronchospasm, if no treatments needed after 48 hours then discontinue using Per Protocol order mode.     If indication present, adjust the RT bronchodilator orders based on the Bronchodilator Assessment Score as indicated below.  Use Inhaler orders unless patient has one or more of the following: on home nebulizer, not able to hold breath for 10 seconds, is not alert and oriented, cannot activate and use MDI correctly, or respiratory rate 25 breaths per minute or more, then use the equivalent nebulizer order(s) with same Frequency and PRN reasons based on the score.  If a patient is on this medication at home then do not decrease Frequency below that used at home.    0-3 - enter or revise RT bronchodilator order(s) to equivalent RT Bronchodilator order with Frequency of every 4  hours PRN for wheezing or increased work of breathing using Per Protocol order mode.        4-6 - enter or revise RT Bronchodilator order(s) to two equivalent RT bronchodilator orders with one order with BID Frequency and one order with Frequency of every 4 hours PRN wheezing or increased work of breathing using Per Protocol order mode.        7-10 - enter or revise RT Bronchodilator order(s) to two equivalent RT bronchodilator orders with one order with TID Frequency and one order with Frequency of every 4 hours PRN wheezing or increased work of breathing using Per Protocol order mode.       11-13 - enter or revise RT Bronchodilator order(s) to one equivalent RT bronchodilator order with QID Frequency and an Albuterol order with Frequency of every 4 hours PRN wheezing or increased work of breathing using Per Protocol order mode.      Greater than 13 - enter or revise RT Bronchodilator order(s) to one equivalent RT bronchodilator order with every 4 hours Frequency and an Albuterol order with Frequency of every 2 hours PRN wheezing or increased work of breathing using Per Protocol order mode.       Electronically signed by Princess Rodriguez RCP on 1/14/2024 at 8:55 AM

## 2024-01-14 NOTE — PROGRESS NOTES
[facility]   Progress Note  Cardiology    CC: sob    HPI: breathing about the same. No chest pain     Past Medical History   has a past medical history of Ambulatory dysfunction, Aortic stenosis, Arthritis, Asthma, Bilateral hilar adenopathy syndrome, CAD (coronary artery disease), Cardiomyopathy ([facility]), CHF (congestive heart failure) ([facility]), Chronic pain, COPD (chronic obstructive pulmonary disease) ([facility]), CVA (cerebral vascular accident) ([facility]), Depression, Diabetes mellitus (HCC), Difficult intravenous access, Emphysema of lung (HCC), ESRD (end stage renal disease) on dialysis ([facility]), Fear of needles, Gastric ulcer, GERD (gastroesophageal reflux disease), Heart valve problem, Hemodialysis patient ([facility]), History of spinal fracture, Hx of blood clots, Hyperlipidemia, Hypertension, MI (myocardial infarction) ([facility]), Neuromuscular disorder ([facility]), Numbness and tingling in left arm, Pneumonia, PONV (postoperative nausea and vomiting), Prolonged emergence from general anesthesia, Sleep apnea, Stroke ([facility]), TIA (transient ischemic attack), and Unspecified diseases of blood and blood-forming organs.    Past Surgical History   has a past surgical history that includes Tonsillectomy; cyst removal (08/14/2013); Colonoscopy; Coronary angioplasty with stent (05/26/15); vascular surgery (aprx 2 years ago); Colonoscopy (2/29/2015); Upper gastrointestinal endoscopy (01/06/2016); Upper gastrointestinal endoscopy (01/29/2017); other surgical history (02/01/2017); Dialysis fistula creation (Left, 10/30/2017); Diagnostic Cardiac Cath Lab Procedure; other surgical history (2018); other surgical history (Bilateral, 06/26/2018); pr laps insertion tunneled intraperitoneal catheter (N/A, 9/21/2018); other surgical history (Right, 09/2018); Dialysis fistula creation (Left, 3/27/2019); other surgical history (05/28/2019); Endoscopy, colon, diagnostic; Catheter Removal (Right, 7/2/2019); Aortic valve replacement (N/A, 10/15/2019);  symmetrical, trachea midline, no adenopathy         Lungs:   Clear to auscultation bilaterally    Chest Wall:  No tenderness or deformity   Heart:  Regular rate and rhythm, S1, S2 normal, no murmur, rub or gallop   Abdomen:   Soft, non tender, normal bowel sounds                Extremities: No cyanosis or edema   Pulses: 2+ and symmetric   Skin: Skin color, texture, turgor normal, no rashes    Pysch: Normal mood and affect   Neurologic: Normal gross motor and sensory exam.        Assessment:    Coronary artery disease - stable  Pulmonary edema - improved post HD. May need further fluid removal   Acute on chronic systolic CHF - due to noncompliance HD. Remains mild decomp   Non-compliance limits treatment   Diabetic ketoacidosis  Elevated troponin - stable  HTN - stable  HLD - stable. Cont pravastatin 40 mg daily     Plan  Compliance discussed  Plavix. No ASA due to plavix and Eliquis  Entresto and Toprol - cont current dose. Will not titrate at this time   ECF  Monitor CBC for toxicity       Scott Styles MD, 1/14/2024 10:34 AM

## 2024-01-14 NOTE — PROGRESS NOTES
4 Eyes Skin Assessment     NAME:  Igor Ann  YOB: 1970  MEDICAL RECORD NUMBER:  [id number]    The patient is being assessed for  Shift Handoff    I agree that at least one RN has performed a thorough Head to Toe Skin Assessment on the patient. ALL assessment sites listed below have been assessed.      Areas assessed by both nurses:    Head, Face, Ears, Shoulders, Back, Chest, Arms, Elbows, Hands, Sacrum. Buttock, Coccyx, Ischium, Legs. Feet and Heels, and Under Medical Devices         Does the Patient have a Wound? Yes wound(s) were present on assessment. LDA wound assessment was Initiated and completed by RN    Patient has a wound on the bridge of his nose it appears to be a stage 2. Silicone non-adherent covering placed over the wound and covered with a bandaid. Patient tolerated dressing well.       Media Information      Document Information    Wound Care Image: Wound   Nose 1.1 by 1 cm   01/14/2024 12:04   Attached To:   Hospital Encounter on 1/9/24   Source Information    Scott Sams RN  [facility]      Media Information      Document Information    Wound Care Image: Wound   Right foot   01/09/2024 18:32   Attached To:   Hospital Encounter on 1/9/24   Source Information    Monica Gallego RN  [facility]      Media Information      Document Information    Wound Care Image: Wound   Left foot   01/09/2024 18:32   Attached To:   Hospital Encounter on 1/9/24   Source Information    Monica Gallego RN  [facility]     Isaac Prevention initiated by RN: Yes  Wound Care Orders initiated by RN: Yes    Pressure Injury (Stage 3,4, Unstageable, DTI, NWPT, and Complex wounds) if present, place Wound referral order by RN under : No    New Ostomies, if present place, Ostomy referral order under : No     Nurse 1 eSignature: Electronically signed by Chelsea Cheung RN on 1/14/24 at 9:51 PM EST    **SHARE this note so that the co-signing nurse can place an

## 2024-01-14 NOTE — CARE COORDINATION
CM checked [employer] several times on Saturday.    CM checked [employer] at 9:51 today. Patient is still in pending status. Does not appear we will hear anything about precert until Tuesday because of the holiday.     CM will continue to check status today - Sunday 1/14/24.    Per Agueda at [facility] patient will need updated PT notes from today or tomorrow.    Per Reese/PT the patient just refused PT.    CM met with patient to explain that he cannot refuse PT as the notes are needed for [facility]. Patient in agreement with PT. '    Reese/PT going back in to work with the patient.

## 2024-01-14 NOTE — PROGRESS NOTES
Department of Internal Medicine  Nephrology Progress Note        SUBJECTIVE:    We are following this patient for ESRD management  The patient was seen and examined; he was resting comfortably with no acute events noted overnight.    ROS: No fever or chills.  Social: No family at bedside.      Physical Exam:    VITALS:  BP (!) 143/82   Pulse 77   Temp 97.4 °F (36.3 °C) (Temporal)   Resp 18   Ht 1.753 m (5' 9\")   Wt 89.5 kg (197 lb 4.8 oz)   SpO2 97%   BMI 29.14 kg/m²     General appearance: Seems comfortable, no acute distress.  Neck: Trachea midline, thyroid normal.   Lungs:  Non labored breathing, CTA to anterior auscultation.  Heart:  S1S2 normal, rub or gallop. No peripheral edema.  Abdomen: Soft, non-tender, no organomegaly.   Skin: No lesions or rashes, warm to touch.     DATA:    CBC with Differential:    Lab Results   Component Value Date/Time    WBC 10.3 01/12/2024 04:26 AM    RBC 3.58 01/12/2024 04:26 AM    HGB 11.0 01/12/2024 04:26 AM    HCT 33.6 01/12/2024 04:26 AM     01/12/2024 04:26 AM    MCV 93.8 01/12/2024 04:26 AM    MCH 30.6 01/12/2024 04:26 AM    MCHC 32.6 01/12/2024 04:26 AM    RDW 20.5 01/12/2024 04:26 AM    SEGSPCT 73.4 05/17/2013 06:50 AM    BANDSPCT 5 01/11/2024 04:22 AM    METASPCT 2 03/03/2022 07:41 AM    LYMPHOPCT 7.5 01/12/2024 04:26 AM    MONOPCT 7.6 01/12/2024 04:26 AM    MYELOPCT 2 03/16/2023 04:45 AM    BASOPCT 0.5 01/12/2024 04:26 AM    MONOSABS 0.8 01/12/2024 04:26 AM    LYMPHSABS 0.8 01/12/2024 04:26 AM    EOSABS 0.2 01/12/2024 04:26 AM    BASOSABS 0.1 01/12/2024 04:26 AM    DIFFTYPE Auto 05/17/2013 06:50 AM     BMP:    Lab Results   Component Value Date/Time     01/13/2024 04:50 AM    K 4.6 01/13/2024 04:50 AM     01/13/2024 04:50 AM    CO2 22 01/13/2024 04:50 AM    BUN 55 01/13/2024 04:50 AM    LABALBU 4.1 01/09/2024 07:41 PM    CREATININE 5.8 01/13/2024 04:50 AM    CALCIUM 8.7 01/13/2024 04:50 AM    GFRAA 10 10/05/2022 11:53 AM    GFRAA >60 05/17/2013

## 2024-01-14 NOTE — PLAN OF CARE
Problem: Discharge Planning  Goal: Discharge to home or other facility with appropriate resources  Outcome: Progressing     Problem: Pain  Goal: Verbalizes/displays adequate comfort level or baseline comfort level  Outcome: Progressing     Problem: Safety - Adult  Goal: Free from fall injury  Outcome: Progressing     Problem: Respiratory - Adult  Goal: Achieves optimal ventilation and oxygenation  Outcome: Progressing     Problem: Cardiovascular - Adult  Goal: Maintains optimal cardiac output and hemodynamic stability  Outcome: Progressing     Problem: Cardiovascular - Adult  Goal: Absence of cardiac dysrhythmias or at baseline  Outcome: Progressing     Problem: Musculoskeletal - Adult  Goal: Return mobility to safest level of function  Outcome: Progressing     Problem: Musculoskeletal - Adult  Goal: Maintain proper alignment of affected body part  Outcome: Progressing     Problem: Skin/Tissue Integrity  Goal: Absence of new skin breakdown  Outcome: Progressing     Problem: Musculoskeletal - Adult  Goal: Return ADL status to a safe level of function

## 2024-01-14 NOTE — PROGRESS NOTES
01/14/24 0129   NIV Type   NIV Started/Stopped On   Equipment Type v60   Mode Bilevel   Mask Type Full face mask   Mask Size Large   Assessment   Pulse 84   SpO2 100 %   Comfort Level Good   Using Accessory Muscles No   Mask Compliance Good   Location Nose   Breath Sounds   Breath Sounds Bilateral Diminished   Settings/Measurements   IPAP 14 cmH20   CPAP/EPAP 6 cmH2O   Vt (Measured) 636 mL   Rate Ordered 16   FiO2  25 %   Minute Volume (L/min) 13.9 Liters   Mask Leak (lpm) 0 lpm   Patient's Home Machine No   Alarm Settings   Alarms On Y   Low Pressure (cmH2O) 8 cmH2O   High Pressure (cmH2O) 30 cmH2O   Apnea (secs) 20 secs   RR Low (bpm) 16   RR High (bpm) 40 br/min

## 2024-01-15 VITALS
RESPIRATION RATE: 16 BRPM | HEART RATE: 74 BPM | WEIGHT: 188.93 LBS | OXYGEN SATURATION: 99 % | DIASTOLIC BLOOD PRESSURE: 65 MMHG | TEMPERATURE: 97.8 F | HEIGHT: 69 IN | SYSTOLIC BLOOD PRESSURE: 107 MMHG | BODY MASS INDEX: 27.98 KG/M2

## 2024-01-15 PROBLEM — R93.89 ABNORMAL CHEST X-RAY: Status: ACTIVE | Noted: 2024-01-15

## 2024-01-15 PROBLEM — G47.33 OSA (OBSTRUCTIVE SLEEP APNEA): Status: ACTIVE | Noted: 2024-01-15

## 2024-01-15 PROBLEM — I50.21 ACUTE SYSTOLIC CHF (CONGESTIVE HEART FAILURE) (HCC): Status: ACTIVE | Noted: 2024-01-15

## 2024-01-15 LAB
ANION GAP SERPL CALCULATED.3IONS-SCNC: 16 MMOL/L (ref 3–16)
BASOPHILS # BLD: 0 K/UL (ref 0–0.2)
BASOPHILS NFR BLD: 0.3 %
BUN SERPL-MCNC: 103 MG/DL (ref 7–20)
CALCIUM SERPL-MCNC: 8.7 MG/DL (ref 8.3–10.6)
CHLORIDE SERPL-SCNC: 95 MMOL/L (ref 99–110)
CO2 SERPL-SCNC: 24 MMOL/L (ref 21–32)
CREAT SERPL-MCNC: 9.1 MG/DL (ref 0.9–1.3)
DEPRECATED RDW RBC AUTO: 20.9 % (ref 12.4–15.4)
EOSINOPHIL # BLD: 0.3 K/UL (ref 0–0.6)
EOSINOPHIL NFR BLD: 3.3 %
GFR SERPLBLD CREATININE-BSD FMLA CKD-EPI: 6 ML/MIN/{1.73_M2}
GLUCOSE BLD-MCNC: 160 MG/DL (ref 70–99)
GLUCOSE BLD-MCNC: 265 MG/DL (ref 70–99)
GLUCOSE SERPL-MCNC: 147 MG/DL (ref 70–99)
HCT VFR BLD AUTO: 32.4 % (ref 40.5–52.5)
HGB BLD-MCNC: 10.5 G/DL (ref 13.5–17.5)
LYMPHOCYTES # BLD: 0.9 K/UL (ref 1–5.1)
LYMPHOCYTES NFR BLD: 12.1 %
MCH RBC QN AUTO: 30.2 PG (ref 26–34)
MCHC RBC AUTO-ENTMCNC: 32.4 G/DL (ref 31–36)
MCV RBC AUTO: 93.2 FL (ref 80–100)
MONOCYTES # BLD: 0.7 K/UL (ref 0–1.3)
MONOCYTES NFR BLD: 9 %
NEUTROPHILS # BLD: 5.8 K/UL (ref 1.7–7.7)
NEUTROPHILS NFR BLD: 75.3 %
PERFORMED ON: ABNORMAL
PERFORMED ON: ABNORMAL
PLATELET # BLD AUTO: 180 K/UL (ref 135–450)
PMV BLD AUTO: 8.8 FL (ref 5–10.5)
POTASSIUM SERPL-SCNC: 5.1 MMOL/L (ref 3.5–5.1)
RBC # BLD AUTO: 3.47 M/UL (ref 4.2–5.9)
SODIUM SERPL-SCNC: 135 MMOL/L (ref 136–145)
WBC # BLD AUTO: 7.7 K/UL (ref 4–11)

## 2024-01-15 PROCEDURE — 99239 HOSP IP/OBS DSCHRG MGMT >30: CPT | Performed by: INTERNAL MEDICINE

## 2024-01-15 PROCEDURE — 6370000000 HC RX 637 (ALT 250 FOR IP): Performed by: INTERNAL MEDICINE

## 2024-01-15 PROCEDURE — 6360000002 HC RX W HCPCS: Performed by: INTERNAL MEDICINE

## 2024-01-15 PROCEDURE — 6370000000 HC RX 637 (ALT 250 FOR IP)

## 2024-01-15 PROCEDURE — 90935 HEMODIALYSIS ONE EVALUATION: CPT

## 2024-01-15 PROCEDURE — 80048 BASIC METABOLIC PNL TOTAL CA: CPT

## 2024-01-15 PROCEDURE — 99233 SBSQ HOSP IP/OBS HIGH 50: CPT | Performed by: INTERNAL MEDICINE

## 2024-01-15 PROCEDURE — 36415 COLL VENOUS BLD VENIPUNCTURE: CPT

## 2024-01-15 PROCEDURE — 85025 COMPLETE CBC W/AUTO DIFF WBC: CPT

## 2024-01-15 PROCEDURE — 94761 N-INVAS EAR/PLS OXIMETRY MLT: CPT

## 2024-01-15 PROCEDURE — 94660 CPAP INITIATION&MGMT: CPT

## 2024-01-15 PROCEDURE — 2700000000 HC OXYGEN THERAPY PER DAY

## 2024-01-15 PROCEDURE — 2580000003 HC RX 258: Performed by: INTERNAL MEDICINE

## 2024-01-15 RX ADMIN — SPIRONOLACTONE 12.5 MG: 25 TABLET ORAL at 12:04

## 2024-01-15 RX ADMIN — DULOXETINE HYDROCHLORIDE 60 MG: 60 CAPSULE, DELAYED RELEASE ORAL at 12:03

## 2024-01-15 RX ADMIN — Medication 10 ML: at 13:24

## 2024-01-15 RX ADMIN — INSULIN LISPRO 2 UNITS: 100 INJECTION, SOLUTION INTRAVENOUS; SUBCUTANEOUS at 01:37

## 2024-01-15 RX ADMIN — METOPROLOL SUCCINATE 25 MG: 25 TABLET, EXTENDED RELEASE ORAL at 12:02

## 2024-01-15 RX ADMIN — TORSEMIDE 100 MG: 100 TABLET ORAL at 13:23

## 2024-01-15 RX ADMIN — SACUBITRIL AND VALSARTAN 1 TABLET: 24; 26 TABLET, FILM COATED ORAL at 12:08

## 2024-01-15 RX ADMIN — APIXABAN 5 MG: 5 TABLET, FILM COATED ORAL at 12:02

## 2024-01-15 RX ADMIN — CLOPIDOGREL BISULFATE 75 MG: 75 TABLET ORAL at 12:02

## 2024-01-15 RX ADMIN — BUSPIRONE HYDROCHLORIDE 10 MG: 10 TABLET ORAL at 12:02

## 2024-01-15 RX ADMIN — EPOETIN ALFA-EPBX 5000 UNITS: 10000 INJECTION, SOLUTION INTRAVENOUS; SUBCUTANEOUS at 10:36

## 2024-01-15 ASSESSMENT — PAIN SCALES - GENERAL
PAINLEVEL_OUTOF10: 0
PAINLEVEL_OUTOF10: 0

## 2024-01-15 NOTE — PROGRESS NOTES
Admit: 2024    Name:  Igor Ann  Room:  [address] [place] [address]  MRN:    [id number]    Critical Care Daily Progress Note for 1/15/2024   Admitted with acute on chronic resp failure and fluid overload    Interval History:   Transferred to ICU for increased work of breathing  Placed on bipap  Feels better        Placed on precedex to tolerate BiPAP  He refuses to come off bipap       Used bipap at night   Now on 4 L     - awaiting pre cert. On oxygen. Having some dyspnea.     -used BiPAP last night.  Awaiting pre-CERT.  Denies any chest pain.    1/15-seen on BiPAP this morning.  Still awaiting pre-CERT.  Hemodialysis today.    Scheduled Meds:   epoetin siddharth-epbx  5,000 Units SubCUTAneous Once per day on     ipratropium 0.5 mg-albuterol 2.5 mg  1 Dose Inhalation BID RT    sacubitril-valsartan  1 tablet Oral BID    spironolactone  12.5 mg Oral Daily    traZODone  50 mg Oral Nightly    metoprolol succinate  25 mg Oral Daily    insulin glargine  10 Units SubCUTAneous Nightly    insulin lispro  0-4 Units SubCUTAneous Q4H    insulin lispro  0-4 Units SubCUTAneous Once    apixaban  5 mg Oral BID    busPIRone  10 mg Oral TID    clopidogrel  75 mg Oral Daily    DULoxetine  60 mg Oral Daily    pravastatin  40 mg Oral QHS    torsemide  100 mg Oral Daily    QUEtiapine  50 mg Oral Nightly    sodium chloride flush  5-40 mL IntraVENous 2 times per day       Continuous Infusions:   sodium chloride      dextrose         PRN Meds:  ipratropium 0.5 mg-albuterol 2.5 mg, heparin (porcine), sodium chloride flush, sodium chloride, ondansetron **OR** ondansetron, polyethylene glycol, acetaminophen **OR** acetaminophen, labetalol, glucose, dextrose bolus **OR** dextrose bolus, glucagon (rDNA), dextrose, dextrose bolus **OR** dextrose bolus, hydrALAZINE       Objective:     Temp  Av.3 °F (36.3 °C)  Min: 96.5 °F (35.8 °C)  Max: 98.4 °F (36.9 °C)  Pulse  Av.6  Min: 72  Max: 92  BP  Min: 106/65  Max:

## 2024-01-15 NOTE — PLAN OF CARE
HEART FAILURE CARE PLAN:    Comorbidities Reviewed: Yes   Patient has a past medical history of Ambulatory dysfunction, Aortic stenosis, Arthritis, Asthma, Bilateral hilar adenopathy syndrome, CAD (coronary artery disease), Cardiomyopathy ([facility]), CHF (congestive heart failure) ([facility]), Chronic pain, COPD (chronic obstructive pulmonary disease) ([facility]), CVA (cerebral vascular accident) ([facility]), Depression, Diabetes mellitus ([facility]), Difficult intravenous access, Emphysema of lung ([facility]), ESRD (end stage renal disease) on dialysis ([facility]), Fear of needles, Gastric ulcer, GERD (gastroesophageal reflux disease), Heart valve problem, Hemodialysis patient ([facility]), History of spinal fracture, Hx of blood clots, Hyperlipidemia, Hypertension, MI (myocardial infarction) ([facility]), Neuromuscular disorder ([facility]), Numbness and tingling in left arm, Pneumonia, PONV (postoperative nausea and vomiting), Prolonged emergence from general anesthesia, Sleep apnea, Stroke ([facility]), TIA (transient ischemic attack), and Unspecified diseases of blood and blood-forming organs.     ECHOCARDIOGRAM Reviewed: Yes   Patient's Ejection Fraction (EF) is less than 40%    Weights Reviewed: Yes   Admission weight: 91.5 kg (201 lb 11.2 oz)   Wt Readings from Last 3 Encounters:   01/14/24 89.5 kg (197 lb 4.8 oz)   01/05/24 91.1 kg (200 lb 13.4 oz)   12/29/23 95.3 kg (210 lb)     Intake & Output Reviewed: Yes     Intake/Output Summary (Last 24 hours) at 1/14/2024 2124  Last data filed at 1/14/2024 1400  Gross per 24 hour   Intake 720 ml   Output --   Net 720 ml     Medications Reviewed: Yes   SCHEDULED HOSPITAL MEDICATIONS:   ipratropium 0.5 mg-albuterol 2.5 mg  1 Dose Inhalation BID RT    sacubitril-valsartan  1 tablet Oral BID    spironolactone  12.5 mg Oral Daily    traZODone  50 mg Oral Nightly    metoprolol succinate  25 mg Oral Daily    insulin glargine  10 Units SubCUTAneous Nightly    insulin lispro  0-4 Units SubCUTAneous Q4H    insulin lispro  0-4 Units  SubCUTAneous Once    mupirocin   Each Nostril BID    apixaban  5 mg Oral BID    busPIRone  10 mg Oral TID    clopidogrel  75 mg Oral Daily    DULoxetine  60 mg Oral Daily    pravastatin  40 mg Oral QHS    torsemide  100 mg Oral Daily    QUEtiapine  50 mg Oral Nightly    sodium chloride flush  5-40 mL IntraVENous 2 times per day     ACE/ARB/ARNI is REQUIRED for EF </= 40% SYSTOLIC FAILURE:   ACE:: None  ARB:: None  ARNI:: Sacubitril/Valsartan-Entresto    Evidenced-Based Beta Blocker is REQUIRED for EF </= 40% SYSTOLIC FAILURE:   :: Metoprolol SUCCinate- Toprol XL    Diuretics:  :: None    Diet Reviewed: Yes   ADULT DIET; Regular; 4 carb choices (60 gm/meal); Low Potassium (Less than 3000 mg/day); Low Phosphorus (Less than 1000 mg); 1000 ml    Goal of Care Reviewed: Yes   Patient and/or Family's stated Goal of Care this Admission: reduce shortness of breath, increase activity tolerance, better understand heart failure and disease management, and be more comfortable prior to discharge.

## 2024-01-15 NOTE — CONSULTS
Palliative Care Initial Note  Palliative Care Admit date:01/15/2024    Advance Directives:pt elects to remain a Full Code at this time.    Plan of care/goals:Reviewed chart. Pt with pmh of:    Patient Active Problem List   Diagnosis    Sepsis without acute organ dysfunction ([facility])    CHF (congestive heart failure) ([facility])    Chronic obstructive pulmonary disease ([facility])    Coronary artery disease involving native coronary artery of native heart    PVD (peripheral vascular disease) ([facility])    Bicuspid aortic valve    Bilateral hilar adenopathy syndrome    Claudication in peripheral vascular disease ([facility])    Uncontrolled hypertension    Diabetic neuropathy ([facility])    Type 2 DM with hypertension and ESRD on dialysis ([facility])    Passive smoke exposure    Depression with anxiety    Community acquired pneumonia of left lower lobe of lung    Obesity    ZAINAB on CPAP    Degeneration of lumbar or lumbosacral intervertebral disc    Lumbar radiculopathy    Lumbosacral spondylosis without myelopathy    Biliary colic    Symptomatic cholelithiasis    Gastroparesis due to DM ([facility])    Angina, class IV ([facility])    Dyspnea    Dyslipidemia    Acute on chronic combined systolic and diastolic CHF (congestive heart failure) ([facility])    Ischemic cardiomyopathy    Tobacco abuse    CVA (cerebral vascular accident) ([facility])    History of CVA (cerebrovascular accident)    Type 2 diabetes mellitus without complication, without long-term current use of insulin (HCC)    ZAINAB treated with BiPAP    Pleural effusion on left    Chronic anemia    Nonrheumatic aortic valve stenosis    Mucus plugging of bronchi    Hemodialysis-associated hypotension    ESRD on dialysis ([facility])    Hypotension due to drugs    Acute diastolic CHF (congestive heart failure) ([facility])    Neuromuscular disorder ([facility])    Renovascular hypertension    Mixed hyperlipidemia    Cigarette nicotine dependence in remission    Pulmonary edema    Fluid overload    Anemia of chronic disease    SOB (shortness of  breath) on exertion    Steal syndrome of dialysis vascular access ([facility])    Chronic, continuous use of opioids    Chronic bronchitis (HCC)    Nasal congestion    Hypercholesteremia    Bradycardia    S/P TAVR (transcatheter aortic valve replacement)    Syncope and collapse    PAF (paroxysmal atrial fibrillation) ([facility])    Bilateral leg weakness    GBS (Guillain-Biggers syndrome) ([facility])    Sinus pause    Weakness of both lower extremities    Sinus bradycardia    Ataxia    Peripheral vascular occlusive disease ([facility])    Cellulitis of right foot    Iliac artery occlusion, 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[facility])    Cellulitis and abscess of hand    Uncontrolled type 2 diabetes mellitus with hyperglycemia ([facility])    Acute encephalopathy    Acute hypoxemic respiratory failure ([facility])    Acute CVA (cerebrovascular accident) ([facility])    Speech problem    Urinary tract infection with hematuria    Respiratory failure with hypoxia ([facility])    Acute respiratory failure with hypercapnia ([facility])    Acute pulmonary edema ([facility])    Grade II diastolic dysfunction    Shock circulatory ([facility])    Smoker    Normocytic normochromic anemia    NSTEMI (non-ST elevated myocardial infarction) ([facility])    SIRS (systemic inflammatory response syndrome) ([facility])    Atrial flutter ([facility])    Liberty-rectal abscess    Somnolence    Pulmonary edema with diastolic CHF, NYHA class 3 ([facility])    Respiratory failure ([facility])    Former smoker    Cardiomyopathy ([facility])    Morbid obesity with BMI of 40.0-44.9, adult ([facility])    Hypoglycemia    Altered mental status    Hypertensive emergency    Dyspnea and respiratory abnormalities    Acute respiratory failure with hypoxia and hypercapnia ([facility])    HFrEF (heart failure with reduced ejection fraction) ([facility])    Pericardial effusion    Hypervolemia    Pneumonia of left lower lobe due to infectious organism    Acute on chronic respiratory failure with hypercapnia ([facility])    Compression atelectasis    Type 2 myocardial infarction ([facility])    Acute respiratory failure with hypoxemia

## 2024-01-15 NOTE — CARE COORDINATION
Reviewed chart, discussed in rounds, pt off unit for HD. Currently waiting on precert to [facility], not back as of this morning. Pt is 2W patient. Plan for CXR today. Will continue to monitor.

## 2024-01-15 NOTE — PROGRESS NOTES
Comprehensive Nutrition Assessment    Type and Reason for Visit:  Initial (patient developed a stage 2 pressure wound on his nose)    Nutrition Recommendations/Plan:   Continue ADULT DIET; Regular; 4 carb choices per meal; Low Potassium; Low Phosphorus diet order + 1000 ml FR per day.   Monitor appetite, meal intake, and acceptance/intake of ONS.   Monitor respiratory status, renal status, and plan of care.   Monitor nutrition-related labs, bowel function, and weight trends.      Malnutrition Assessment:  Malnutrition Status:  At risk for malnutrition (01/15/24 1254)    Context:  Acute Illness     Findings of the 6 clinical characteristics of malnutrition:  Energy Intake:  Mild decrease in energy intake   Weight Loss:   (-9# or 4.6% weight loss since 1/3/24)     Body Fat Loss:  No significant body fat loss     Muscle Mass Loss:  No significant muscle mass loss    Fluid Accumulation:  No significant fluid accumulation     Strength:  Not Performed    Nutrition Assessment:    patient is improved from a nutritional standpoint AEB he is consuming 50% or greater of most of his meals and he received dialysis this am, however, he remains at risk for further compromise d/t need for dialysis d/t renal dysfunction, altered nutrition-related labs, and patient has a stage 2 pressure ulcer that has developed on his nose; will continue ADULT DIET; Regular; 4 carb choices per meal; Low Potassium; Low Phosphorus diet order + 1000 ml FR per day and monitor nutrition status    Nutrition Related Findings:    patient is A & O x 4; patient presented to the hospital with c/o SOB and he had missed HD PTA; patient received HD this am; patient is awaiting pre-cert to come back for d/c to [facility]; patient consumed 51-75% x 2 meals and 26-50% x 1 meal on 1/14/24; patient did not get to consume breakfast this am d/t HD; + BM on 1/12/24; on low-dose SSI and 10 units Lantus nightly Wound Type: Pressure Injury, Stage II (stage 2 pressure wound on  his nose)       Current Nutrition Intake & Therapies:    Average Meal Intake: 26-50%, 51-75%  Average Supplements Intake: None Ordered  ADULT DIET; Regular; 4 carb choices (60 gm/meal); Low Potassium (Less than 3000 mg/day); Low Phosphorus (Less than 1000 mg); 1000 ml    Anthropometric Measures:  Height: 175.3 cm (5' 9.02\")  Ideal Body Weight (IBW): 160 lbs (73 kg)    Admission Body Weight: 91.5 kg (201 lb 11.2 oz) (obtained on 1/10/24; actual weight)  Current Body Weight: 85.7 kg (188 lb 15 oz) (obtained on 1/15/24; actual weight post-HD), 118.1 % IBW. Weight Source: Bed Scale  Current BMI (kg/m2): 27.9  Usual Body Weight: 89.8 kg (197 lb 15.6 oz) (obtained on 1/3/24; actual weight)  % Weight Change (Calculated): -4.6  Weight Adjustment For: No Adjustment                 BMI Categories: Overweight (BMI 25.0-29.9)    Estimated Daily Nutrient Needs:  Energy Requirements Based On: Kcal/kg  Weight Used for Energy Requirements: Current  Energy (kcal/day): 1714 - 1971 kcals based on 20-23 kcals/kg/CBW  Weight Used for Protein Requirements: Current  Protein (g/day): 103 - 120 g protein based on 1.2-1.4 g/kg/CBW (+ HD patient)  Method Used for Fluid Requirements: 1 ml/kcal  Fluid (ml/day): 1714 - 1971 ml    Nutrition Diagnosis:   Altered nutrition-related lab values related to renal dysfunction, inadequate protein-energy intake, impaired respiratory function as evidenced by intake 26-50%, intake 51-75%, wounds, lab values, dialysis    Nutrition Interventions:   Food and/or Nutrient Delivery: Continue Current Diet  Nutrition Education/Counseling: No recommendation at this time  Coordination of Nutrition Care: Continue to monitor while inpatient, Coordination of Care, Interdisciplinary Rounds  Plan of Care discussed with: ICU Team    Goals:     Goals: Meet at least 75% of estimated needs, by next RD assessment       Nutrition Monitoring and Evaluation:   Behavioral-Environmental Outcomes: Readiness for Change, Knowledge or

## 2024-01-15 NOTE — PROGRESS NOTES
Treatment time: 3 hours  Net UF: 1500 ml     Pre weight: 87.2 kg  Post weight: 85.7 kg      Access used: l cvc     Access function: well with  ml/min     Medications or blood products given: heparin given as ordered, retacrit given as ordered.      Regular outpatient schedule: mwf     Patient tolerated treatment ok, uf goal adjusted per md  at bed side. Patient remained stable throughout entire treatment and upon the entering  and  exiting the dialysis  suite via transport.     Report given to NAVJOT Vaughan and copy of dialysis treatment record placed in chart, to be scanned into EMR.

## 2024-01-15 NOTE — CARE COORDINATION
DISCHARGE ORDER  Date/Time 1/15/2024 3:09 PM  Completed by: Claudia Campbell, Case Management    Patient Name: Igor Ann    : 1970      Admit order Date and Status: 2024 Stable  Noted discharge order. (verify MD's last order for status of admission/Traditional Medicare 3 [place] Inpatient qualifying stay required for SNF)    Confirmed discharge plan with:              Patient:  Yes              When pt confirms DC plan does any support person need to be contacted by CM Yes if yes who_Crystal wife_____                      Discharge to Facility: [facility]   Facility phone number for staff giving report: [phone]  Pre-certification completed: yes   Hospital Exemption Notification (HENS) completed: yes   Discharge orders and Continuity of Care faxed to facility:  yes      Transportation:               Medical Transport explained with choice list offered to pt/family.                Choice:(no preference)  Agency used: [facility]   time:   1545      Pt/family/Nursing/Facility aware of  time:   Yes Names: PT, CM, wife Aguead Rojas at [facility], Alexus RN  Ambulance form completed:  Roundtrip      Date Last IMM Given: 1/15/2024    Comments:Reviewed chart, noted DC order, met with pt at bedside. Pt to be DC to [facility] today, [facility] providing transportation at 1545.    Pt is being d/c'd to [facility] today. Pt's O2 sats are 99% on 4 L NC.    Discharge timeout done with PT, CM, wife Agueda Rojas at [facility], Alexus RN. All discharge needs and concerns addressed.    Discharging nurse to complete CELINE, reconcile AVS, and place final copy with patient's discharge packet. Discharging RN to ensure that written prescriptions for  Level II medications are sent with patient to the facility as per protocol.

## 2024-01-15 NOTE — PROGRESS NOTES

## 2024-01-15 NOTE — PROGRESS NOTES
Report received from dialysis.  Pt arrived on unit via bed. Meds given, lunch provided.  See flowsheets.

## 2024-01-15 NOTE — PROGRESS NOTES
Pulmonary Progress Note  CC: Acute on chronic resp failure    Subjective:    HD this am   4LPM   BiPAP overnight       EXAM: /64   Pulse 76   Temp 97.8 °F (36.6 °C) (Temporal)   Resp 18   Ht 1.753 m (5' 9\")   Wt 88.3 kg (194 lb 9.6 oz)   SpO2 98%   BMI 28.74 kg/m²  on 4L  Constitutional:  No acute distress.   Eyes: PERRL. Conjunctivae anicteric.   ENT: Normal nose. Normal tongue.    Neck:  Trachea is midline.   Respiratory: No accessory muscle usage.  Decreased breath sounds. No wheezes. + rales. No Rhonchi.  Cardiovascular: Normal S1S2. No digit clubbing. No digit cyanosis. No LE edema.   Psychiatric: No anxiety or Agitation. Alert and Oriented to person, place and time.    Scheduled Meds:   ipratropium 0.5 mg-albuterol 2.5 mg  1 Dose Inhalation BID RT    sacubitril-valsartan  1 tablet Oral BID    spironolactone  12.5 mg Oral Daily    traZODone  50 mg Oral Nightly    metoprolol succinate  25 mg Oral Daily    insulin glargine  10 Units SubCUTAneous Nightly    insulin lispro  0-4 Units SubCUTAneous Q4H    insulin lispro  0-4 Units SubCUTAneous Once    mupirocin   Each Nostril BID    apixaban  5 mg Oral BID    busPIRone  10 mg Oral TID    clopidogrel  75 mg Oral Daily    DULoxetine  60 mg Oral Daily    pravastatin  40 mg Oral QHS    torsemide  100 mg Oral Daily    QUEtiapine  50 mg Oral Nightly    sodium chloride flush  5-40 mL IntraVENous 2 times per day     Continuous Infusions:   sodium chloride      dextrose       PRN Meds:  ipratropium 0.5 mg-albuterol 2.5 mg, heparin (porcine), sodium chloride flush, sodium chloride, ondansetron **OR** ondansetron, polyethylene glycol, acetaminophen **OR** acetaminophen, labetalol, glucose, dextrose bolus **OR** dextrose bolus, glucagon (rDNA), dextrose, dextrose bolus **OR** dextrose bolus, hydrALAZINE    Labs:  CBC:   No results for input(s): \"WBC\", \"HGB\", \"HCT\", \"MCV\", \"PLT\" in the last 72 hours.  BMP:   Recent Labs     01/13/24  0450 01/15/24  0510    659*    K 4.6 5.1    95*   CO2 22 24   BUN 55* 103*   CREATININE 5.8* 9.1*         Cultures:  1/9 COvid and RSV negative       CXR 1/9  images reviewed by me and showed:   Small bilateral pleural effusions with bibasilar airspace disease.  This   could reflect pneumonia in the appropriate clinical setting.         ASSESSMENT:  Acute on chronic hypoxic respiratory failure   Abnormal CXR with edema and effusion- favor CHF  Acute on chronic systolic CHF  COPD  ZAINAB on bipap  ESRD on HD       PLAN:  Bipap as needed and HS  Supplemental oxygen to maintain SaO2 >92%; wean as tolerated  Inhaled bronchodilators  Nephro and cardiology following  Repeat CXR   SQ Heparin for DVT prophylaxis  D/C planning is okay with pulmonary.

## 2024-01-15 NOTE — PROGRESS NOTES
01/15/24 0310   NIV Type   Equipment Type v60   Mode Bilevel   Mask Type Total face   Assessment   Pulse 76   SpO2 97 %   Settings/Measurements   IPAP 14 cmH20   CPAP/EPAP 6 cmH2O   Vt (Measured) 747 mL   Rate Ordered 16   FiO2  25 %   Minute Volume (L/min) 13.9 Liters   Mask Leak (lpm) 11 lpm   Patient's Home Machine No   Alarm Settings   Alarms On Y

## 2024-01-15 NOTE — PROGRESS NOTES
VSS, pt tolerating diet without complaints of nausea.  Sitting on side of bed, moves all 4 extremities. Pt continues to voice concerns over not being able to care for himself at this time, pt encouraged to participate in extended care at receiving facility.

## 2024-01-15 NOTE — PROGRESS NOTES
Shift assessment, completed, see flow sheet. Pt is alert and oriented x 4.     SR 80, /53, SpO2 100%, on 3L NC. Respirations are easy, even, and unlabored. Bilateral lung sounds diminished. PIV WNL and SL. L TDC WNL    Oliguric, HD pt.    Call light within reach. Bed in lowest position. Bed alarm on.

## 2024-01-15 NOTE — PROGRESS NOTES
Department of Internal Medicine  Nephrology Progress Note        SUBJECTIVE:    We are following this patient for ESRD management  The patient was seen and examined at HD on 1/15, complicated by tachycardia and feeling of lightheadedness low blood pressure was stable.  UF goal adjusted, 500 mL of normal saline given back to the patient    ROS: No fever or chills.  Social: No family at bedside.      Physical Exam:    VITALS:  BP (!) 163/77   Pulse 72   Temp (!) 96.7 °F (35.9 °C) (Oral)   Resp 14   Ht 1.753 m (5' 9\")   Wt 87.2 kg (192 lb 3.9 oz)   SpO2 98%   BMI 28.39 kg/m²     General appearance: Seems comfortable, no acute distress.  Neck: Trachea midline, thyroid normal.   Lungs:  Non labored breathing, CTA to anterior auscultation.  Heart:  S1S2 normal, rub or gallop. No peripheral edema.  Abdomen: Soft, non-tender, no organomegaly.   Skin: No lesions or rashes, warm to touch.     DATA:    CBC with Differential:    Lab Results   Component Value Date/Time    WBC 7.7 01/15/2024 05:10 AM    RBC 3.47 01/15/2024 05:10 AM    HGB 10.5 01/15/2024 05:10 AM    HCT 32.4 01/15/2024 05:10 AM     01/15/2024 05:10 AM    MCV 93.2 01/15/2024 05:10 AM    MCH 30.2 01/15/2024 05:10 AM    MCHC 32.4 01/15/2024 05:10 AM    RDW 20.9 01/15/2024 05:10 AM    SEGSPCT 73.4 05/17/2013 06:50 AM    BANDSPCT 5 01/11/2024 04:22 AM    METASPCT 2 03/03/2022 07:41 AM    LYMPHOPCT 12.1 01/15/2024 05:10 AM    MONOPCT 9.0 01/15/2024 05:10 AM    MYELOPCT 2 03/16/2023 04:45 AM    BASOPCT 0.3 01/15/2024 05:10 AM    MONOSABS 0.7 01/15/2024 05:10 AM    LYMPHSABS 0.9 01/15/2024 05:10 AM    EOSABS 0.3 01/15/2024 05:10 AM    BASOSABS 0.0 01/15/2024 05:10 AM    DIFFTYPE Auto 05/17/2013 06:50 AM     BMP:    Lab Results   Component Value Date/Time     01/15/2024 05:10 AM    K 5.1 01/15/2024 05:10 AM    CL 95 01/15/2024 05:10 AM    CO2 24 01/15/2024 05:10 AM     01/15/2024 05:10 AM    LABALBU 4.1 01/09/2024 07:41 PM    CREATININE 9.1  01/15/2024 05:10 AM    CALCIUM 8.7 01/15/2024 05:10 AM    GFRAA 10 10/05/2022 11:53 AM    GFRAA >60 05/17/2013 06:50 AM    LABGLOM 6 01/15/2024 05:10 AM    GLUCOSE 147 01/15/2024 05:10 AM       IMPRESSION/RECOMMENDATIONS:      - ESRD: Hemodialysis per MWF schedule  -New target weight will be closer to the postweight from 1/15     - HTN: Blood pressure within acceptable range.     - Anemia: Holding DAVON due to rising hemoglobin.

## 2024-01-15 NOTE — PROGRESS NOTES
01/14/24 2334   NIV Type   NIV Started/Stopped On   Equipment Type v60   Mode Bilevel   Mask Type Total face   Mask Size Large   Assessment   Pulse 83   SpO2 94 %   Comfort Level Good   Using Accessory Muscles No   Mask Compliance Good   Breath Sounds   Breath Sounds Bilateral Diminished   Settings/Measurements   IPAP 14 cmH20   CPAP/EPAP 6 cmH2O   Vt (Measured) 592 mL   Rate Ordered 16   FiO2  25 %   Minute Volume (L/min) 10.8 Liters   Mask Leak (lpm) 0 lpm   Patient's Home Machine No   Alarm Settings   Alarms On Y   Low Pressure (cmH2O) 8 cmH2O   High Pressure (cmH2O) 30 cmH2O   Apnea (secs) 20 secs   RR Low (bpm) 16   RR High (bpm) 40 br/min

## 2024-01-15 NOTE — PLAN OF CARE
Problem: Discharge Planning  Goal: Discharge to home or other facility with appropriate resources  Outcome: Progressing     Problem: Pain  Goal: Verbalizes/displays adequate comfort level or baseline comfort level  Outcome: Progressing     Problem: Safety - Adult  Goal: Free from fall injury  Outcome: Progressing     Problem: Respiratory - Adult  Goal: Achieves optimal ventilation and oxygenation  Outcome: Progressing     Problem: Cardiovascular - Adult  Goal: Maintains optimal cardiac output and hemodynamic stability  Outcome: Progressing  Goal: Absence of cardiac dysrhythmias or at baseline  Outcome: Progressing     Problem: Musculoskeletal - Adult  Goal: Return mobility to safest level of function  Outcome: Progressing  Goal: Maintain proper alignment of affected body part  Outcome: Progressing  Goal: Return ADL status to a safe level of function  Outcome: Progressing     Problem: Gastrointestinal - Adult  Goal: Minimal or absence of nausea and vomiting  Outcome: Progressing  Goal: Maintains or returns to baseline bowel function  Outcome: Progressing  Goal: Maintains adequate nutritional intake  Outcome: Progressing  Goal: Establish and maintain optimal ostomy function  Outcome: Progressing     Problem: Genitourinary - Adult  Goal: Absence of urinary retention  Outcome: Progressing  Goal: Urinary catheter remains patent  Outcome: Progressing     Problem: Chronic Conditions and Co-morbidities  Goal: Patient's chronic conditions and co-morbidity symptoms are monitored and maintained or improved  Outcome: Progressing     Problem: Skin/Tissue Integrity  Goal: Absence of new skin breakdown  Description: 1.  Monitor for areas of redness and/or skin breakdown  2.  Assess vascular access sites hourly  3.  Every 4-6 hours minimum:  Change oxygen saturation probe site  4.  Every 4-6 hours:  If on nasal continuous positive airway pressure, respiratory therapy assess nares and determine need for appliance change or resting

## 2024-01-16 NOTE — DISCHARGE SUMMARY
Name:  Igor Ann  Room:  3002/3002-01  MRN:    [id number]    Discharge Summary      This discharge summary is in conjunction with a complete physical exam done on the day of discharge.      Discharging Physician: SERGIO GEE MD      Admit: 1/9/2024  Discharge:  1/15/2024    Diagnoses this Admission    Principal Problem:    Acute on chronic respiratory failure (HCC)  Active Problems:    Coronary artery disease involving native coronary artery of native heart    Chronic obstructive pulmonary disease (HCC)    Acute on chronic systolic CHF (congestive heart failure) (HCC)    Non-compliance    Diabetic ketoacidosis without coma associated with diabetes mellitus due to underlying condition (HCC)    Elevated troponin    Abnormal chest x-ray    ZAINAB (obstructive sleep apnea)    Acute systolic CHF (congestive heart failure) (HCC)  Resolved Problems:    * No resolved hospital problems. *      Procedures (Please Review Full Report for Details)    Hemo dialysis    Consults    IP CONSULT TO PULMONOLOGY  IP CONSULT TO NEPHROLOGY  IP CONSULT TO CRITICAL CARE  IP CONSULT TO DIABETES EDUCATOR  IP CONSULT TO CARDIOLOGY  IP CONSULT TO PALLIATIVE CARE  IP CONSULT TO SPIRITUAL SERVICES      HPI:    The patient is a 53 y.o. male with pmhx of ESRD on HD, chronic hypoxic respiratory failure, CAD,CHF, COPD, hx of VTE, DM type 2, ZAINAB who presented to [facility] ED with complaint of shortness of breath and missed dialysis   He had 7 visits to ER last month for similar complaints   He was admitted here a week ago and was aggressively diuresed below dry weight for recurrent complaints of sob  He was added on buspar to cymbalta for anxiety but reports he did not take any meds since he left home  \" I have 38 meds and I dont know what to take \"  Comes with high BP above 200 systolic,severe dyspnea and high sugar above 500 with mild DKA symptoms. Reports taking 10 units lantus daily but not checking sugars  Also has mid

## 2024-01-24 ENCOUNTER — OFFICE VISIT (OUTPATIENT)
Dept: CARDIOLOGY CLINIC | Age: 54
End: 2024-01-24

## 2024-01-24 VITALS
DIASTOLIC BLOOD PRESSURE: 60 MMHG | WEIGHT: 188 LBS | HEART RATE: 73 BPM | BODY MASS INDEX: 27.85 KG/M2 | HEIGHT: 69 IN | SYSTOLIC BLOOD PRESSURE: 100 MMHG | OXYGEN SATURATION: 98 %

## 2024-01-24 DIAGNOSIS — Z09 HOSPITAL DISCHARGE FOLLOW-UP: ICD-10-CM

## 2024-01-24 DIAGNOSIS — I50.20 HFREF (HEART FAILURE WITH REDUCED EJECTION FRACTION) (HCC): Primary | ICD-10-CM

## 2024-01-24 DIAGNOSIS — I25.5 ISCHEMIC CARDIOMYOPATHY: ICD-10-CM

## 2024-01-24 ASSESSMENT — ENCOUNTER SYMPTOMS: SHORTNESS OF BREATH: 1

## 2024-01-24 NOTE — PATIENT INSTRUCTIONS
Check BMP  Continue same medications  Plan to recheck heart function in 3 months  Follow up 3-4 weeks

## 2024-01-24 NOTE — PROGRESS NOTES
Post-Discharge Transitional Care  Follow Up  Igor Ann   YOB: 1970  Date of Office Visit:  1/24/2024  Date of Hospital Admission: 1/9/24  Date of Hospital Discharge: 1/15/24  Risk of hospital readmission (high >=14%. Medium >=10%) :  Care management risk score Rising risk (score 2-5) and Complex Care (Scores >=6): No Risk Score On File   Non face to face  following discharge, date last encounter closed (first attempt may have been earlier): *TCM call 1/16/2024 by Ovdiio Haq with [facility]  Call initiated 2 business days of discharge: 1/16/2024  ASSESSMENT/PLAN:   HFrEF (heart failure with reduced ejection fraction) ([facility])  -     Basic Metabolic Panel; Future    Medical Decision Making: high complexity  No follow-ups on file.       Subjective:   HPI:  Follow up of Hospital problems/diagnosis(es): Acute on chronic HFrEF, hypertensive emergency  Inpatient course: Discharge summary reviewed- see chart.  Interval history/Current status: See HPI  Medications marked \"taking\" at this time  Medications patient taking as of now reconciled against medications ordered at time of hospital discharge: Yes    [facility]   Cardiology Note              Date:  January 24, 2024  Patientname: Igor Ann  YOB: 1970    Primary Care physician: Vonnie Cleveland APRN - NP    HISTORY OF PRESENT ILLNESS: Igor Ann is a 53 y.o. male CAD, AS s/p TAVR, HFrEF, ischemic cardiomyopathy, HTN, PAF, PVD, ESRD, CVA, DM, ZAINAB, COPD, noncompliance. He has a history of PATRICIA LAD in 5/2015. [facility] 2017 showed nonobstructive CAD and patent stent. He had PTA/stent R external iliac 5/2019. Echo 6/2019 showed EF 55%, bicuspid aortic valve with severe stenosis. C 8/2019 showed patent LAD stent. He had TAVR 10/2019.  Echo 3/2023 showed EF 25-30%.  Stress test abnormal.  [facility] 5/9/2023 showed severe multivessel CAD, PATRICIA ramus, PATRICIA LCx, PATRICIA RCA.  He has had numerous hospitalizations 2023 for

## 2024-01-25 LAB
BUN BLDV-MCNC: 34 MG/DL
CALCIUM SERPL-MCNC: 8.6 MG/DL
CHLORIDE BLD-SCNC: 91 MMOL/L
CO2: 32 MMOL/L
CREAT SERPL-MCNC: 5 MG/DL
EGFR: 12
GLUCOSE BLD-MCNC: 113 MG/DL
POTASSIUM SERPL-SCNC: 4.4 MMOL/L
SODIUM BLD-SCNC: 134 MMOL/L

## 2024-02-08 PROBLEM — R79.89 ELEVATED TROPONIN: Status: RESOLVED | Noted: 2024-01-09 | Resolved: 2024-02-08

## 2024-03-27 ENCOUNTER — APPOINTMENT (OUTPATIENT)
Dept: VASCULAR LAB | Age: 54
DRG: 305 | End: 2024-03-27
Payer: MEDICAID

## 2024-03-27 ENCOUNTER — APPOINTMENT (OUTPATIENT)
Dept: GENERAL RADIOLOGY | Age: 54
DRG: 305 | End: 2024-03-27
Payer: MEDICAID

## 2024-03-27 ENCOUNTER — HOSPITAL ENCOUNTER (INPATIENT)
Age: 54
LOS: 15 days | Discharge: NURSING, REHAB OR LONG-TERM CARE | DRG: 305 | End: 2024-04-11
Attending: EMERGENCY MEDICINE | Admitting: INTERNAL MEDICINE
Payer: MEDICAID

## 2024-03-27 DIAGNOSIS — I50.9 ACUTE ON CHRONIC CONGESTIVE HEART FAILURE, UNSPECIFIED HEART FAILURE TYPE (HCC): ICD-10-CM

## 2024-03-27 DIAGNOSIS — I96 GANGRENE OF RIGHT FOOT (HCC): ICD-10-CM

## 2024-03-27 DIAGNOSIS — I48.91 ATRIAL FIBRILLATION, UNSPECIFIED TYPE (HCC): ICD-10-CM

## 2024-03-27 DIAGNOSIS — Z99.2 ESRD (END STAGE RENAL DISEASE) ON DIALYSIS (HCC): Primary | ICD-10-CM

## 2024-03-27 DIAGNOSIS — J81.0 FLASH PULMONARY EDEMA (HCC): ICD-10-CM

## 2024-03-27 DIAGNOSIS — N18.6 ESRD (END STAGE RENAL DISEASE) ON DIALYSIS (HCC): Primary | ICD-10-CM

## 2024-03-27 DIAGNOSIS — M86.172 OSTEOMYELITIS OF ANKLE OR FOOT, LEFT, ACUTE (HCC): ICD-10-CM

## 2024-03-27 LAB
ALBUMIN SERPL-MCNC: 3.6 G/DL (ref 3.4–5)
ALBUMIN/GLOB SERPL: 0.8 {RATIO} (ref 1.1–2.2)
ALP SERPL-CCNC: 163 U/L (ref 40–129)
ALT SERPL-CCNC: 11 U/L (ref 10–40)
ANION GAP SERPL CALCULATED.3IONS-SCNC: 18 MMOL/L (ref 3–16)
AST SERPL-CCNC: 37 U/L (ref 15–37)
BASE EXCESS BLDV CALC-SCNC: 2.3 MMOL/L (ref -3–3)
BASE EXCESS BLDV CALC-SCNC: 2.9 MMOL/L (ref -3–3)
BASOPHILS # BLD: 0.1 K/UL (ref 0–0.2)
BASOPHILS # BLD: 0.2 K/UL (ref 0–0.2)
BASOPHILS NFR BLD: 0.9 %
BASOPHILS NFR BLD: 1.2 %
BILIRUB SERPL-MCNC: 0.3 MG/DL (ref 0–1)
BUN SERPL-MCNC: 48 MG/DL (ref 7–20)
CALCIUM SERPL-MCNC: 9.7 MG/DL (ref 8.3–10.6)
CHLORIDE SERPL-SCNC: 88 MMOL/L (ref 99–110)
CO2 BLDV-SCNC: 29 MMOL/L
CO2 BLDV-SCNC: 29 MMOL/L
CO2 SERPL-SCNC: 26 MMOL/L (ref 21–32)
COHGB MFR BLDV: 3.3 % (ref 0–1.5)
COHGB MFR BLDV: 5.1 % (ref 0–1.5)
CREAT SERPL-MCNC: 6.4 MG/DL (ref 0.9–1.3)
DEPRECATED RDW RBC AUTO: 16.6 % (ref 12.4–15.4)
DEPRECATED RDW RBC AUTO: 16.6 % (ref 12.4–15.4)
EOSINOPHIL # BLD: 0.9 K/UL (ref 0–0.6)
EOSINOPHIL # BLD: 0.9 K/UL (ref 0–0.6)
EOSINOPHIL NFR BLD: 6.7 %
EOSINOPHIL NFR BLD: 7.5 %
FLUAV RNA RESP QL NAA+PROBE: NOT DETECTED
FLUBV RNA RESP QL NAA+PROBE: NOT DETECTED
GFR SERPLBLD CREATININE-BSD FMLA CKD-EPI: 10 ML/MIN/{1.73_M2}
GLUCOSE BLD-MCNC: 189 MG/DL (ref 70–99)
GLUCOSE BLD-MCNC: 332 MG/DL (ref 70–99)
GLUCOSE SERPL-MCNC: 343 MG/DL (ref 70–99)
HCO3 BLDV-SCNC: 27.6 MMOL/L (ref 23–29)
HCO3 BLDV-SCNC: 27.7 MMOL/L (ref 23–29)
HCT VFR BLD AUTO: 32.5 % (ref 40.5–52.5)
HCT VFR BLD AUTO: 36.9 % (ref 40.5–52.5)
HGB BLD-MCNC: 10.3 G/DL (ref 13.5–17.5)
HGB BLD-MCNC: 11.6 G/DL (ref 13.5–17.5)
LACTATE BLDV-SCNC: 2.1 MMOL/L (ref 0.4–2)
LACTATE BLDV-SCNC: 2.5 MMOL/L (ref 0.4–2)
LYMPHOCYTES # BLD: 0.9 K/UL (ref 1–5.1)
LYMPHOCYTES # BLD: 0.9 K/UL (ref 1–5.1)
LYMPHOCYTES NFR BLD: 7.2 %
LYMPHOCYTES NFR BLD: 7.4 %
MCH RBC QN AUTO: 28 PG (ref 26–34)
MCH RBC QN AUTO: 28.1 PG (ref 26–34)
MCHC RBC AUTO-ENTMCNC: 31.4 G/DL (ref 31–36)
MCHC RBC AUTO-ENTMCNC: 31.5 G/DL (ref 31–36)
MCV RBC AUTO: 89.2 FL (ref 80–100)
MCV RBC AUTO: 89.2 FL (ref 80–100)
METHGB MFR BLDV: 0.3 %
METHGB MFR BLDV: 0.3 %
MONOCYTES # BLD: 0.8 K/UL (ref 0–1.3)
MONOCYTES # BLD: 0.8 K/UL (ref 0–1.3)
MONOCYTES NFR BLD: 6.6 %
MONOCYTES NFR BLD: 6.6 %
NEUTROPHILS # BLD: 9.4 K/UL (ref 1.7–7.7)
NEUTROPHILS # BLD: 9.9 K/UL (ref 1.7–7.7)
NEUTROPHILS NFR BLD: 77.8 %
NEUTROPHILS NFR BLD: 78.1 %
NT-PROBNP SERPL-MCNC: ABNORMAL PG/ML (ref 0–124)
O2 THERAPY: ABNORMAL
O2 THERAPY: ABNORMAL
PCO2 BLDV: 43.2 MMHG (ref 40–50)
PCO2 BLDV: 45.3 MMHG (ref 40–50)
PERFORMED ON: ABNORMAL
PERFORMED ON: ABNORMAL
PH BLDV: 7.4 [PH] (ref 7.35–7.45)
PH BLDV: 7.42 [PH] (ref 7.35–7.45)
PLATELET # BLD AUTO: 301 K/UL (ref 135–450)
PLATELET # BLD AUTO: 374 K/UL (ref 135–450)
PMV BLD AUTO: 8 FL (ref 5–10.5)
PMV BLD AUTO: 8.7 FL (ref 5–10.5)
PO2 BLDV: 40.5 MMHG (ref 25–40)
PO2 BLDV: 71.3 MMHG (ref 25–40)
POTASSIUM SERPL-SCNC: 4.8 MMOL/L (ref 3.5–5.1)
POTASSIUM SERPL-SCNC: 5.9 MMOL/L (ref 3.5–5.1)
PROCALCITONIN SERPL IA-MCNC: 0.94 NG/ML (ref 0–0.15)
PROT SERPL-MCNC: 8.1 G/DL (ref 6.4–8.2)
RBC # BLD AUTO: 3.65 M/UL (ref 4.2–5.9)
RBC # BLD AUTO: 4.14 M/UL (ref 4.2–5.9)
SAO2 % BLDV: 73 %
SAO2 % BLDV: 94 %
SARS-COV-2 RNA RESP QL NAA+PROBE: NOT DETECTED
SODIUM SERPL-SCNC: 132 MMOL/L (ref 136–145)
TROPONIN, HIGH SENSITIVITY: 161 NG/L (ref 0–22)
TROPONIN, HIGH SENSITIVITY: 166 NG/L (ref 0–22)
TROPONIN, HIGH SENSITIVITY: 206 NG/L (ref 0–22)
WBC # BLD AUTO: 12 K/UL (ref 4–11)
WBC # BLD AUTO: 12.7 K/UL (ref 4–11)

## 2024-03-27 PROCEDURE — 2500000003 HC RX 250 WO HCPCS: Performed by: INTERNAL MEDICINE

## 2024-03-27 PROCEDURE — 84132 ASSAY OF SERUM POTASSIUM: CPT

## 2024-03-27 PROCEDURE — 80053 COMPREHEN METABOLIC PANEL: CPT

## 2024-03-27 PROCEDURE — 6370000000 HC RX 637 (ALT 250 FOR IP): Performed by: EMERGENCY MEDICINE

## 2024-03-27 PROCEDURE — 93971 EXTREMITY STUDY: CPT

## 2024-03-27 PROCEDURE — 6370000000 HC RX 637 (ALT 250 FOR IP): Performed by: INTERNAL MEDICINE

## 2024-03-27 PROCEDURE — 90935 HEMODIALYSIS ONE EVALUATION: CPT

## 2024-03-27 PROCEDURE — 6360000002 HC RX W HCPCS: Performed by: INTERNAL MEDICINE

## 2024-03-27 PROCEDURE — 96365 THER/PROPH/DIAG IV INF INIT: CPT

## 2024-03-27 PROCEDURE — 5A1D70Z PERFORMANCE OF URINARY FILTRATION, INTERMITTENT, LESS THAN 6 HOURS PER DAY: ICD-10-PCS | Performed by: INTERNAL MEDICINE

## 2024-03-27 PROCEDURE — 6360000002 HC RX W HCPCS: Performed by: EMERGENCY MEDICINE

## 2024-03-27 PROCEDURE — 2060000000 HC ICU INTERMEDIATE R&B

## 2024-03-27 PROCEDURE — 6360000002 HC RX W HCPCS

## 2024-03-27 PROCEDURE — 83036 HEMOGLOBIN GLYCOSYLATED A1C: CPT

## 2024-03-27 PROCEDURE — 83605 ASSAY OF LACTIC ACID: CPT

## 2024-03-27 PROCEDURE — 87150 DNA/RNA AMPLIFIED PROBE: CPT

## 2024-03-27 PROCEDURE — 99285 EMERGENCY DEPT VISIT HI MDM: CPT

## 2024-03-27 PROCEDURE — 2500000003 HC RX 250 WO HCPCS: Performed by: EMERGENCY MEDICINE

## 2024-03-27 PROCEDURE — 82803 BLOOD GASES ANY COMBINATION: CPT

## 2024-03-27 PROCEDURE — 5A09557 ASSISTANCE WITH RESPIRATORY VENTILATION, GREATER THAN 96 CONSECUTIVE HOURS, CONTINUOUS POSITIVE AIRWAY PRESSURE: ICD-10-PCS | Performed by: INTERNAL MEDICINE

## 2024-03-27 PROCEDURE — 84145 PROCALCITONIN (PCT): CPT

## 2024-03-27 PROCEDURE — 2580000003 HC RX 258: Performed by: EMERGENCY MEDICINE

## 2024-03-27 PROCEDURE — 96375 TX/PRO/DX INJ NEW DRUG ADDON: CPT

## 2024-03-27 PROCEDURE — 87636 SARSCOV2 & INF A&B AMP PRB: CPT

## 2024-03-27 PROCEDURE — 94761 N-INVAS EAR/PLS OXIMETRY MLT: CPT

## 2024-03-27 PROCEDURE — 2700000000 HC OXYGEN THERAPY PER DAY

## 2024-03-27 PROCEDURE — 83880 ASSAY OF NATRIURETIC PEPTIDE: CPT

## 2024-03-27 PROCEDURE — 2580000003 HC RX 258: Performed by: INTERNAL MEDICINE

## 2024-03-27 PROCEDURE — 71045 X-RAY EXAM CHEST 1 VIEW: CPT

## 2024-03-27 PROCEDURE — 85025 COMPLETE CBC W/AUTO DIFF WBC: CPT

## 2024-03-27 PROCEDURE — 94640 AIRWAY INHALATION TREATMENT: CPT

## 2024-03-27 PROCEDURE — 93970 EXTREMITY STUDY: CPT

## 2024-03-27 PROCEDURE — 93005 ELECTROCARDIOGRAM TRACING: CPT | Performed by: EMERGENCY MEDICINE

## 2024-03-27 PROCEDURE — 87040 BLOOD CULTURE FOR BACTERIA: CPT

## 2024-03-27 PROCEDURE — 96367 TX/PROPH/DG ADDL SEQ IV INF: CPT

## 2024-03-27 PROCEDURE — 96366 THER/PROPH/DIAG IV INF ADDON: CPT

## 2024-03-27 PROCEDURE — 94660 CPAP INITIATION&MGMT: CPT

## 2024-03-27 PROCEDURE — 84484 ASSAY OF TROPONIN QUANT: CPT

## 2024-03-27 RX ORDER — ACETAMINOPHEN 325 MG/1
650 TABLET ORAL EVERY 6 HOURS PRN
Status: DISCONTINUED | OUTPATIENT
Start: 2024-03-27 | End: 2024-04-11 | Stop reason: HOSPADM

## 2024-03-27 RX ORDER — TRAZODONE HYDROCHLORIDE 50 MG/1
50 TABLET ORAL NIGHTLY
Status: DISCONTINUED | OUTPATIENT
Start: 2024-03-27 | End: 2024-04-11 | Stop reason: HOSPADM

## 2024-03-27 RX ORDER — IPRATROPIUM BROMIDE AND ALBUTEROL SULFATE 2.5; .5 MG/3ML; MG/3ML
1 SOLUTION RESPIRATORY (INHALATION) EVERY 6 HOURS PRN
Status: DISCONTINUED | OUTPATIENT
Start: 2024-03-27 | End: 2024-04-02 | Stop reason: SDUPTHER

## 2024-03-27 RX ORDER — ACETAMINOPHEN 650 MG/1
650 SUPPOSITORY RECTAL EVERY 6 HOURS PRN
Status: DISCONTINUED | OUTPATIENT
Start: 2024-03-27 | End: 2024-04-11 | Stop reason: HOSPADM

## 2024-03-27 RX ORDER — INSULIN LISPRO 100 [IU]/ML
0-4 INJECTION, SOLUTION INTRAVENOUS; SUBCUTANEOUS NIGHTLY
Status: DISCONTINUED | OUTPATIENT
Start: 2024-03-27 | End: 2024-04-11 | Stop reason: HOSPADM

## 2024-03-27 RX ORDER — BUSPIRONE HYDROCHLORIDE 5 MG/1
10 TABLET ORAL 3 TIMES DAILY
Status: DISCONTINUED | OUTPATIENT
Start: 2024-03-27 | End: 2024-04-11 | Stop reason: HOSPADM

## 2024-03-27 RX ORDER — ENALAPRILAT 1.25 MG/ML
1.25 INJECTION INTRAVENOUS ONCE
Status: COMPLETED | OUTPATIENT
Start: 2024-03-27 | End: 2024-03-27

## 2024-03-27 RX ORDER — ONDANSETRON 2 MG/ML
INJECTION INTRAMUSCULAR; INTRAVENOUS
Status: COMPLETED
Start: 2024-03-27 | End: 2024-03-27

## 2024-03-27 RX ORDER — ONDANSETRON 4 MG/1
4 TABLET, ORALLY DISINTEGRATING ORAL EVERY 8 HOURS PRN
Status: DISCONTINUED | OUTPATIENT
Start: 2024-03-27 | End: 2024-04-11 | Stop reason: HOSPADM

## 2024-03-27 RX ORDER — INSULIN GLARGINE 100 [IU]/ML
15 INJECTION, SOLUTION SUBCUTANEOUS NIGHTLY
Status: DISCONTINUED | OUTPATIENT
Start: 2024-03-27 | End: 2024-04-11 | Stop reason: HOSPADM

## 2024-03-27 RX ORDER — HEPARIN SODIUM 1000 [USP'U]/ML
3600 INJECTION, SOLUTION INTRAVENOUS; SUBCUTANEOUS PRN
Status: DISCONTINUED | OUTPATIENT
Start: 2024-03-27 | End: 2024-04-11 | Stop reason: HOSPADM

## 2024-03-27 RX ORDER — IPRATROPIUM BROMIDE AND ALBUTEROL SULFATE 2.5; .5 MG/3ML; MG/3ML
1 SOLUTION RESPIRATORY (INHALATION)
Status: DISCONTINUED | OUTPATIENT
Start: 2024-03-27 | End: 2024-03-28

## 2024-03-27 RX ORDER — OXYCODONE HYDROCHLORIDE AND ACETAMINOPHEN 5; 325 MG/1; MG/1
1 TABLET ORAL EVERY 12 HOURS PRN
Status: DISCONTINUED | OUTPATIENT
Start: 2024-03-27 | End: 2024-04-04

## 2024-03-27 RX ORDER — DEXTROSE MONOHYDRATE 100 MG/ML
INJECTION, SOLUTION INTRAVENOUS CONTINUOUS PRN
Status: DISCONTINUED | OUTPATIENT
Start: 2024-03-27 | End: 2024-04-11 | Stop reason: HOSPADM

## 2024-03-27 RX ORDER — PRAVASTATIN SODIUM 40 MG
40 TABLET ORAL DAILY
Status: DISCONTINUED | OUTPATIENT
Start: 2024-03-27 | End: 2024-04-11 | Stop reason: HOSPADM

## 2024-03-27 RX ORDER — SODIUM CHLORIDE 0.9 % (FLUSH) 0.9 %
5-40 SYRINGE (ML) INJECTION EVERY 12 HOURS SCHEDULED
Status: DISCONTINUED | OUTPATIENT
Start: 2024-03-27 | End: 2024-04-11 | Stop reason: HOSPADM

## 2024-03-27 RX ORDER — CALCIUM ACETATE 667 MG/1
1 CAPSULE ORAL
Status: DISCONTINUED | OUTPATIENT
Start: 2024-03-27 | End: 2024-03-29

## 2024-03-27 RX ORDER — ONDANSETRON 2 MG/ML
4 INJECTION INTRAMUSCULAR; INTRAVENOUS EVERY 6 HOURS PRN
Status: DISCONTINUED | OUTPATIENT
Start: 2024-03-27 | End: 2024-04-11 | Stop reason: HOSPADM

## 2024-03-27 RX ORDER — FUROSEMIDE 10 MG/ML
40 INJECTION INTRAMUSCULAR; INTRAVENOUS ONCE
Status: COMPLETED | OUTPATIENT
Start: 2024-03-27 | End: 2024-03-27

## 2024-03-27 RX ORDER — INSULIN LISPRO 100 [IU]/ML
0-8 INJECTION, SOLUTION INTRAVENOUS; SUBCUTANEOUS
Status: DISCONTINUED | OUTPATIENT
Start: 2024-03-27 | End: 2024-04-11 | Stop reason: HOSPADM

## 2024-03-27 RX ORDER — FUROSEMIDE 10 MG/ML
40 INJECTION INTRAMUSCULAR; INTRAVENOUS 2 TIMES DAILY
Status: DISCONTINUED | OUTPATIENT
Start: 2024-03-27 | End: 2024-03-27

## 2024-03-27 RX ORDER — DULOXETIN HYDROCHLORIDE 60 MG/1
60 CAPSULE, DELAYED RELEASE ORAL DAILY
Status: DISCONTINUED | OUTPATIENT
Start: 2024-03-27 | End: 2024-04-11 | Stop reason: HOSPADM

## 2024-03-27 RX ORDER — QUETIAPINE FUMARATE 25 MG/1
50 TABLET, FILM COATED ORAL NIGHTLY
Status: DISCONTINUED | OUTPATIENT
Start: 2024-03-27 | End: 2024-04-11 | Stop reason: HOSPADM

## 2024-03-27 RX ORDER — SODIUM CHLORIDE 0.9 % (FLUSH) 0.9 %
5-40 SYRINGE (ML) INJECTION PRN
Status: DISCONTINUED | OUTPATIENT
Start: 2024-03-27 | End: 2024-04-11 | Stop reason: HOSPADM

## 2024-03-27 RX ORDER — SODIUM CHLORIDE 9 MG/ML
INJECTION, SOLUTION INTRAVENOUS PRN
Status: DISCONTINUED | OUTPATIENT
Start: 2024-03-27 | End: 2024-04-11 | Stop reason: HOSPADM

## 2024-03-27 RX ORDER — METOPROLOL SUCCINATE 25 MG/1
25 TABLET, EXTENDED RELEASE ORAL DAILY
Status: DISCONTINUED | OUTPATIENT
Start: 2024-03-27 | End: 2024-04-11 | Stop reason: HOSPADM

## 2024-03-27 RX ORDER — FUROSEMIDE 10 MG/ML
100 INJECTION INTRAMUSCULAR; INTRAVENOUS ONCE
Status: COMPLETED | OUTPATIENT
Start: 2024-03-27 | End: 2024-03-27

## 2024-03-27 RX ORDER — SPIRONOLACTONE 25 MG/1
12.5 TABLET ORAL DAILY
Status: DISCONTINUED | OUTPATIENT
Start: 2024-03-27 | End: 2024-04-06

## 2024-03-27 RX ORDER — POLYETHYLENE GLYCOL 3350 17 G/17G
17 POWDER, FOR SOLUTION ORAL DAILY PRN
Status: DISCONTINUED | OUTPATIENT
Start: 2024-03-27 | End: 2024-04-11 | Stop reason: HOSPADM

## 2024-03-27 RX ADMIN — PRAVASTATIN SODIUM 40 MG: 40 TABLET ORAL at 09:45

## 2024-03-27 RX ADMIN — INSULIN LISPRO 6 UNITS: 100 INJECTION, SOLUTION INTRAVENOUS; SUBCUTANEOUS at 12:01

## 2024-03-27 RX ADMIN — APIXABAN 5 MG: 5 TABLET, FILM COATED ORAL at 09:45

## 2024-03-27 RX ADMIN — FUROSEMIDE 100 MG: 10 INJECTION, SOLUTION INTRAMUSCULAR; INTRAVENOUS at 11:43

## 2024-03-27 RX ADMIN — ENALAPRILAT 1.25 MG: 2.5 INJECTION INTRAVENOUS at 00:30

## 2024-03-27 RX ADMIN — BUSPIRONE HYDROCHLORIDE 10 MG: 5 TABLET ORAL at 19:57

## 2024-03-27 RX ADMIN — IPRATROPIUM BROMIDE AND ALBUTEROL SULFATE 1 DOSE: 2.5; .5 SOLUTION RESPIRATORY (INHALATION) at 19:58

## 2024-03-27 RX ADMIN — INSULIN GLARGINE 15 UNITS: 100 INJECTION, SOLUTION SUBCUTANEOUS at 19:59

## 2024-03-27 RX ADMIN — METOPROLOL SUCCINATE 25 MG: 25 TABLET, FILM COATED, EXTENDED RELEASE ORAL at 09:45

## 2024-03-27 RX ADMIN — TRAZODONE HYDROCHLORIDE 50 MG: 50 TABLET ORAL at 19:57

## 2024-03-27 RX ADMIN — BUSPIRONE HYDROCHLORIDE 10 MG: 5 TABLET ORAL at 13:51

## 2024-03-27 RX ADMIN — SPIRONOLACTONE 12.5 MG: 25 TABLET ORAL at 09:45

## 2024-03-27 RX ADMIN — NITROGLYCERIN 1 INCH: 20 OINTMENT TOPICAL at 00:33

## 2024-03-27 RX ADMIN — ONDANSETRON 4 MG: 2 INJECTION INTRAMUSCULAR; INTRAVENOUS at 15:30

## 2024-03-27 RX ADMIN — APIXABAN 5 MG: 5 TABLET, FILM COATED ORAL at 19:57

## 2024-03-27 RX ADMIN — QUETIAPINE FUMARATE 50 MG: 25 TABLET ORAL at 19:57

## 2024-03-27 RX ADMIN — OXYCODONE AND ACETAMINOPHEN 1 TABLET: 5; 325 TABLET ORAL at 13:51

## 2024-03-27 RX ADMIN — BUSPIRONE HYDROCHLORIDE 10 MG: 5 TABLET ORAL at 09:45

## 2024-03-27 RX ADMIN — IPRATROPIUM BROMIDE AND ALBUTEROL SULFATE 1 DOSE: 2.5; .5 SOLUTION RESPIRATORY (INHALATION) at 12:55

## 2024-03-27 RX ADMIN — DULOXETINE HYDROCHLORIDE 60 MG: 60 CAPSULE, DELAYED RELEASE ORAL at 09:45

## 2024-03-27 RX ADMIN — PIPERACILLIN AND TAZOBACTAM 3375 MG: 3; .375 INJECTION, POWDER, FOR SOLUTION INTRAVENOUS at 05:33

## 2024-03-27 RX ADMIN — VANCOMYCIN HYDROCHLORIDE 2000 MG: 10 INJECTION, POWDER, LYOPHILIZED, FOR SOLUTION INTRAVENOUS at 03:40

## 2024-03-27 RX ADMIN — CALCIUM ACETATE 667 MG: 667 CAPSULE ORAL at 11:43

## 2024-03-27 RX ADMIN — FUROSEMIDE 40 MG: 10 INJECTION, SOLUTION INTRAMUSCULAR; INTRAVENOUS at 00:30

## 2024-03-27 RX ADMIN — SODIUM CHLORIDE, PRESERVATIVE FREE 10 ML: 5 INJECTION INTRAVENOUS at 19:58

## 2024-03-27 ASSESSMENT — PAIN DESCRIPTION - DESCRIPTORS
DESCRIPTORS: ACHING
DESCRIPTORS: ACHING;DISCOMFORT
DESCRIPTORS: ACHING

## 2024-03-27 ASSESSMENT — PAIN SCALES - GENERAL
PAINLEVEL_OUTOF10: 6
PAINLEVEL_OUTOF10: 6
PAINLEVEL_OUTOF10: 10
PAINLEVEL_OUTOF10: 10

## 2024-03-27 ASSESSMENT — PAIN - FUNCTIONAL ASSESSMENT: PAIN_FUNCTIONAL_ASSESSMENT: 0-10

## 2024-03-27 ASSESSMENT — PAIN DESCRIPTION - LOCATION
LOCATION: BACK
LOCATION: BACK;HIP
LOCATION: BACK

## 2024-03-27 ASSESSMENT — PAIN DESCRIPTION - ORIENTATION
ORIENTATION: MID;LOWER
ORIENTATION: LOWER
ORIENTATION: MID;LOWER

## 2024-03-27 NOTE — H&P
Hospital Medicine History & Physical      Date of Admission: 3/27/2024    Date of Service:  Pt seen/examined on 3/27/24     [x]Admitted to Inpatient with expected LOS greater than two midnights due to medical therapy.  []Placed in Observation status.      Chief Admission Complaint:  sob      Presenting Admission History:      53 y.o. male with esrd (on hd mwf), cad, cardiomyopathy, gerd, htn, copd/chronic resp failure(4L) who presented to [facility] with worsening sob.  Pt was resting in bed and managed to go to sleep. Shortly after he woke up , 'gasping for air'  and sob. He felt 'like he was going to die'.  Pt states he has been compliant with HD but not with medications (as he is overwhelmed with what he has to take and does not have them well organized at home). EMS was called and noted pt to be sob and hypoxic at home  - today he notes chest tightness and is asking for a breathing tx  -No f/chils/cough/n/v/diarrhea of late        Assessment/Plan:      Current Principal Problem:  Acute on chronic systolic CHF (congestive heart failure) (HCC)      Acute on chronic resp failure- possibly from flash pulm edema from uncontrolled htn. Covid/flu neg  -was on intermittent bipap  -tele  -supp oxygen, weaned as tolerated  -started on empriric abx  in ER, procal slightly elevated(possibly from flash pulm edema), did not continue addit abx at this time  -trended zhao    Esrd- on hd  -nephro consulted, apprec mgmt    CAD-without complaints of cp  -on statin/plavix/bb, imdur    Rt le pain- check us    COPD/chronic resp failure- on 4L at home  -on prn inhalers    Hx of VTE-per emr, pt has not been compliant  -on eliquis  -check LE u/s given rt leg pains    Hx of CVA- stable  -on plavix and statin    Depression/anxiety- defer to outpt mmgt  -continued buspar and cymbalta    DM2- controlled, last A1c 6.9  -ac/hs bs  -lantus qhs  -ssi medium    ZAINAB- defer to outpt pulm mmgt      Discussed management and the need for

## 2024-03-27 NOTE — ED PROVIDER NOTES
Emergency Physician Note  [facility]  ED    Pt Name: Igor Ann  MRN: [id number]  Birthdate 1970  Date of evaluation: 3/27/2024  Provider: Scott Rice MD  PCP: Vonnie Cleveland APRN - NP    Note Open Time: 6:35 AM EDT 3/27/24    Chief Complaint  Respiratory Distress (Baseline on 02 @ 4lpm.)       History of Present Illness  Igor Ann is a 53 y.o. male who presents to the ED for shortness of breath.  Patient reports that he became acutely short of breath tonight when lying in bed.  He has a history of end-stage renal disease and is on dialysis Monday Wednesday and Friday.  He went on Monday.  He denies any chest pains.  No fevers.  EMS reports they found the patient dyspneic and hypoxic.  Patient also has a history of CHF.    History from : Patient and EMS    Limitations to history : None    REVIEW OF SYSTEMS :      Review of Systems    Positives and Pertinent negatives as per HPI.     Medications/allergies/medical/social/family history  I have reviewed the following from the nursing documentation:      Prior to Admission medications    Medication Sig Start Date End Date Taking? Authorizing Provider   metoprolol succinate (TOPROL XL) 25 MG extended release tablet Take 1 tablet by mouth daily 1/12/24   Efren Lawrence MD   sacubitril-valsartan (ENTRESTO) 24-26 MG per tablet Take 1 tablet by mouth 2 times daily 1/12/24   Efren Lawrence MD   spironolactone (ALDACTONE) 25 MG tablet Take 0.5 tablets by mouth daily 1/13/24   Efren Lawrence MD   insulin glargine (LANTUS) 100 UNIT/ML injection vial Inject 15 Units into the skin nightly 1/6/24   Gómez Hwang MD   busPIRone (BUSPAR) 10 MG tablet Take 1 tablet by mouth 3 times daily 1/6/24   Gómez Hwang MD   apixaban (ELIQUIS) 5 MG TABS tablet Take 1 tablet by mouth 2 times daily    Provider, MD Gerson   torsemide (DEMADEX) 100 MG tablet Take 1 tablet by mouth daily    Provider,

## 2024-03-27 NOTE — DIALYSIS
Treatment time: 3.5 hours  Net UF: 3000 ml     Pre weight: 96.5 kg  Post weight:93.5 kg (standing scale)  EDW: 94 kg (challenging)  Crit Line Used: Yes Ending Profile: A  Refill Present: No    Access used: L TDC    Access function: Well with  ml/min     Medications or blood products given: Zofran     Regular outpatient schedule: [facility]     Summary of response to treatment: Patient tolerated treatment well and without any complications. Before start of treatment patient vomited approximately 200ml; Zofran given. Patient remained stable throughout entire treatment and upon the exiting the dialysis suite via transport.     Report given to Amaya Barraza RN and copy of dialysis treatment record placed in chart, to be scanned into EMR.

## 2024-03-28 LAB
ANION GAP SERPL CALCULATED.3IONS-SCNC: 13 MMOL/L (ref 3–16)
BASOPHILS # BLD: 0.1 K/UL (ref 0–0.2)
BASOPHILS NFR BLD: 1.2 %
BUN SERPL-MCNC: 29 MG/DL (ref 7–20)
CALCIUM SERPL-MCNC: 9.7 MG/DL (ref 8.3–10.6)
CHLORIDE SERPL-SCNC: 92 MMOL/L (ref 99–110)
CHOLEST SERPL-MCNC: 134 MG/DL (ref 0–199)
CO2 SERPL-SCNC: 27 MMOL/L (ref 21–32)
CREAT SERPL-MCNC: 4.9 MG/DL (ref 0.9–1.3)
DEPRECATED RDW RBC AUTO: 16.7 % (ref 12.4–15.4)
EKG ATRIAL RATE: 125 BPM
EKG DIAGNOSIS: NORMAL
EKG Q-T INTERVAL: 284 MS
EKG QRS DURATION: 86 MS
EKG QTC CALCULATION (BAZETT): 441 MS
EKG R AXIS: 60 DEGREES
EKG T AXIS: 35 DEGREES
EKG VENTRICULAR RATE: 145 BPM
EOSINOPHIL # BLD: 0.9 K/UL (ref 0–0.6)
EOSINOPHIL NFR BLD: 8.6 %
EST. AVERAGE GLUCOSE BLD GHB EST-MCNC: 165.7 MG/DL
GFR SERPLBLD CREATININE-BSD FMLA CKD-EPI: 13 ML/MIN/{1.73_M2}
GLUCOSE BLD-MCNC: 132 MG/DL (ref 70–99)
GLUCOSE BLD-MCNC: 172 MG/DL (ref 70–99)
GLUCOSE BLD-MCNC: 337 MG/DL (ref 70–99)
GLUCOSE SERPL-MCNC: 171 MG/DL (ref 70–99)
HBA1C MFR BLD: 7.4 %
HCT VFR BLD AUTO: 32.9 % (ref 40.5–52.5)
HDLC SERPL-MCNC: 49 MG/DL (ref 40–60)
HGB BLD-MCNC: 10.6 G/DL (ref 13.5–17.5)
LACTATE BLDV-SCNC: 1.2 MMOL/L (ref 0.4–2)
LDLC SERPL CALC-MCNC: 66 MG/DL
LYMPHOCYTES # BLD: 0.8 K/UL (ref 1–5.1)
LYMPHOCYTES NFR BLD: 7.7 %
MAGNESIUM SERPL-MCNC: 1.9 MG/DL (ref 1.8–2.4)
MCH RBC QN AUTO: 28.4 PG (ref 26–34)
MCHC RBC AUTO-ENTMCNC: 32.1 G/DL (ref 31–36)
MCV RBC AUTO: 88.5 FL (ref 80–100)
MONOCYTES # BLD: 0.6 K/UL (ref 0–1.3)
MONOCYTES NFR BLD: 6.4 %
NEUTROPHILS # BLD: 7.6 K/UL (ref 1.7–7.7)
NEUTROPHILS NFR BLD: 76.1 %
PERFORMED ON: ABNORMAL
PLATELET # BLD AUTO: 310 K/UL (ref 135–450)
PMV BLD AUTO: 8.3 FL (ref 5–10.5)
POTASSIUM SERPL-SCNC: 4.5 MMOL/L (ref 3.5–5.1)
RBC # BLD AUTO: 3.72 M/UL (ref 4.2–5.9)
REPORT: NORMAL
SODIUM SERPL-SCNC: 132 MMOL/L (ref 136–145)
TRIGL SERPL-MCNC: 95 MG/DL (ref 0–150)
TROPONIN, HIGH SENSITIVITY: 158 NG/L (ref 0–22)
TROPONIN, HIGH SENSITIVITY: 160 NG/L (ref 0–22)
VLDLC SERPL CALC-MCNC: 19 MG/DL
WBC # BLD AUTO: 10 K/UL (ref 4–11)

## 2024-03-28 PROCEDURE — 85025 COMPLETE CBC W/AUTO DIFF WBC: CPT

## 2024-03-28 PROCEDURE — 6370000000 HC RX 637 (ALT 250 FOR IP): Performed by: INTERNAL MEDICINE

## 2024-03-28 PROCEDURE — 94640 AIRWAY INHALATION TREATMENT: CPT

## 2024-03-28 PROCEDURE — 83605 ASSAY OF LACTIC ACID: CPT

## 2024-03-28 PROCEDURE — 84484 ASSAY OF TROPONIN QUANT: CPT

## 2024-03-28 PROCEDURE — 2500000003 HC RX 250 WO HCPCS: Performed by: INTERNAL MEDICINE

## 2024-03-28 PROCEDURE — 2580000003 HC RX 258: Performed by: INTERNAL MEDICINE

## 2024-03-28 PROCEDURE — 90935 HEMODIALYSIS ONE EVALUATION: CPT

## 2024-03-28 PROCEDURE — 94761 N-INVAS EAR/PLS OXIMETRY MLT: CPT

## 2024-03-28 PROCEDURE — 2060000000 HC ICU INTERMEDIATE R&B

## 2024-03-28 PROCEDURE — 6360000002 HC RX W HCPCS: Performed by: INTERNAL MEDICINE

## 2024-03-28 PROCEDURE — 87040 BLOOD CULTURE FOR BACTERIA: CPT

## 2024-03-28 PROCEDURE — 94660 CPAP INITIATION&MGMT: CPT

## 2024-03-28 PROCEDURE — 83735 ASSAY OF MAGNESIUM: CPT

## 2024-03-28 PROCEDURE — 93010 ELECTROCARDIOGRAM REPORT: CPT | Performed by: INTERNAL MEDICINE

## 2024-03-28 PROCEDURE — 80048 BASIC METABOLIC PNL TOTAL CA: CPT

## 2024-03-28 PROCEDURE — 80061 LIPID PANEL: CPT

## 2024-03-28 PROCEDURE — 2700000000 HC OXYGEN THERAPY PER DAY

## 2024-03-28 RX ORDER — IPRATROPIUM BROMIDE AND ALBUTEROL SULFATE 2.5; .5 MG/3ML; MG/3ML
1 SOLUTION RESPIRATORY (INHALATION)
Status: DISCONTINUED | OUTPATIENT
Start: 2024-03-28 | End: 2024-04-02

## 2024-03-28 RX ORDER — HEPARIN SODIUM 1000 [USP'U]/ML
INJECTION, SOLUTION INTRAVENOUS; SUBCUTANEOUS
Status: DISPENSED
Start: 2024-03-28 | End: 2024-03-29

## 2024-03-28 RX ADMIN — BUSPIRONE HYDROCHLORIDE 10 MG: 5 TABLET ORAL at 20:08

## 2024-03-28 RX ADMIN — SACUBITRIL AND VALSARTAN 1 TABLET: 24; 26 TABLET, FILM COATED ORAL at 20:08

## 2024-03-28 RX ADMIN — SODIUM CHLORIDE, PRESERVATIVE FREE 10 ML: 5 INJECTION INTRAVENOUS at 09:56

## 2024-03-28 RX ADMIN — VANCOMYCIN HYDROCHLORIDE 1250 MG: 10 INJECTION, POWDER, LYOPHILIZED, FOR SOLUTION INTRAVENOUS at 13:11

## 2024-03-28 RX ADMIN — APIXABAN 5 MG: 5 TABLET, FILM COATED ORAL at 20:08

## 2024-03-28 RX ADMIN — TRAZODONE HYDROCHLORIDE 50 MG: 50 TABLET ORAL at 20:09

## 2024-03-28 RX ADMIN — QUETIAPINE FUMARATE 50 MG: 25 TABLET ORAL at 20:08

## 2024-03-28 RX ADMIN — CALCIUM ACETATE 667 MG: 667 CAPSULE ORAL at 00:03

## 2024-03-28 RX ADMIN — IPRATROPIUM BROMIDE AND ALBUTEROL SULFATE 1 DOSE: 2.5; .5 SOLUTION RESPIRATORY (INHALATION) at 20:16

## 2024-03-28 RX ADMIN — INSULIN LISPRO 6 UNITS: 100 INJECTION, SOLUTION INTRAVENOUS; SUBCUTANEOUS at 17:41

## 2024-03-28 RX ADMIN — OXYCODONE AND ACETAMINOPHEN 1 TABLET: 5; 325 TABLET ORAL at 20:14

## 2024-03-28 RX ADMIN — BUSPIRONE HYDROCHLORIDE 10 MG: 5 TABLET ORAL at 17:40

## 2024-03-28 RX ADMIN — SODIUM CHLORIDE, PRESERVATIVE FREE 10 ML: 5 INJECTION INTRAVENOUS at 20:09

## 2024-03-28 RX ADMIN — INSULIN GLARGINE 15 UNITS: 100 INJECTION, SOLUTION SUBCUTANEOUS at 20:08

## 2024-03-28 RX ADMIN — IPRATROPIUM BROMIDE AND ALBUTEROL SULFATE 1 DOSE: 2.5; .5 SOLUTION RESPIRATORY (INHALATION) at 08:12

## 2024-03-28 ASSESSMENT — PAIN DESCRIPTION - LOCATION
LOCATION: BACK;HIP

## 2024-03-28 ASSESSMENT — PAIN SCALES - GENERAL
PAINLEVEL_OUTOF10: 5
PAINLEVEL_OUTOF10: 8
PAINLEVEL_OUTOF10: 5
PAINLEVEL_OUTOF10: 8

## 2024-03-28 ASSESSMENT — PAIN DESCRIPTION - ORIENTATION: ORIENTATION: MID;LOWER

## 2024-03-28 ASSESSMENT — PAIN DESCRIPTION - PAIN TYPE
TYPE: CHRONIC PAIN

## 2024-03-28 ASSESSMENT — PAIN DESCRIPTION - DESCRIPTORS
DESCRIPTORS: ACHING

## 2024-03-28 NOTE — DISCHARGE INSTRUCTIONS
Discharge Instructions    -  Keep dressing clean, dry and intact until your next office visit with Dr. Berg  -  Remain non weight bearing to your right foot as much as possible  -  Take all medications as prescribed  -  Place ice behind right knee for no more than twenty minutes at a time to help decrease pain and swelling  -  Elevate right lower extremity on top of pillows to help decrease pain and swelling  -  Follow up with Dr. Berg within the next week   -  Call Dr. Berg's office at [phone] if you have any problems, questions or concerns                Heart Failure Resources:  Heart Failure Interactive Workbook:  Go to https://[employer]/publication/?c=340213 for a Free Heart Failure Interactive Workbook provided by The American [employer] Association. This interactive workbook will provide information on Healthier Living with Heart Failure. Please copy and paste link into search bar. Use your mouse to scroll through the pages.    HF [place] stephanie:   Heart Failure Free smart phone stephanie available for iPhone and Android download. Use your phone to track sodium intake, fluid intake, symptoms, and weight.     Low Sodium Diet / Recipes:  Go to www.[employer]org website for “renal” diet which is Low Sodium! [employer] is a dialysis company, but this website offers free seasonal cookbooks. Each quarter, they will release 25-30 new recipes with a breakdown of calories, sodium, and glucose. You can also go to [email or web address]/recipes website for free recipes.     Home Exercise Program:   Identification of Green/Yellow/Red zones:  You should be able to identify when you feel good (green zone), if you have 1-2 symptoms of HF (yellow zone), or if you are in need of medical attention (red zone).  In your CHF education folder you were provided a “stop light tool” to outline this information.     We want to you to rate your exertion levels:    Our therapy team has discussed means of identification with you such as

## 2024-03-28 NOTE — DIALYSIS
Treatment time: 2 hours  Net UF: 2450 ml     Pre weight: 93.5 kg  Post weight:91.1 kg  EDW: 94 kg, challenging    Access used: L TDC    Access function: well with   ml/min     Medications or blood products given: Vancomycin      Regular outpatient schedule: MWF     Summary of response to treatment: Patient tolerated treatment well and without any complications. Patient remained stable throughout entire treatment and upon the exiting the dialysis suite via transport.     Report given to Elsy Meier RN and copy of dialysis treatment record placed in chart, to be scanned into EMR.

## 2024-03-29 LAB
ANION GAP SERPL CALCULATED.3IONS-SCNC: 14 MMOL/L (ref 3–16)
BASOPHILS # BLD: 0.1 K/UL (ref 0–0.2)
BASOPHILS NFR BLD: 0.9 %
BUN SERPL-MCNC: 50 MG/DL (ref 7–20)
CALCIUM SERPL-MCNC: 9.1 MG/DL (ref 8.3–10.6)
CHLORIDE SERPL-SCNC: 89 MMOL/L (ref 99–110)
CO2 SERPL-SCNC: 25 MMOL/L (ref 21–32)
CREAT SERPL-MCNC: 5.8 MG/DL (ref 0.9–1.3)
DEPRECATED RDW RBC AUTO: 16.6 % (ref 12.4–15.4)
EOSINOPHIL # BLD: 0.6 K/UL (ref 0–0.6)
EOSINOPHIL NFR BLD: 5.3 %
GFR SERPLBLD CREATININE-BSD FMLA CKD-EPI: 11 ML/MIN/{1.73_M2}
GLUCOSE BLD-MCNC: 142 MG/DL (ref 70–99)
GLUCOSE BLD-MCNC: 184 MG/DL (ref 70–99)
GLUCOSE BLD-MCNC: 251 MG/DL (ref 70–99)
GLUCOSE BLD-MCNC: 269 MG/DL (ref 70–99)
GLUCOSE SERPL-MCNC: 233 MG/DL (ref 70–99)
HCT VFR BLD AUTO: 34.5 % (ref 40.5–52.5)
HGB BLD-MCNC: 10.9 G/DL (ref 13.5–17.5)
LYMPHOCYTES # BLD: 0.9 K/UL (ref 1–5.1)
LYMPHOCYTES NFR BLD: 7.9 %
MAGNESIUM SERPL-MCNC: 1.9 MG/DL (ref 1.8–2.4)
MCH RBC QN AUTO: 28 PG (ref 26–34)
MCHC RBC AUTO-ENTMCNC: 31.6 G/DL (ref 31–36)
MCV RBC AUTO: 88.7 FL (ref 80–100)
MONOCYTES # BLD: 0.9 K/UL (ref 0–1.3)
MONOCYTES NFR BLD: 7.9 %
NEUTROPHILS # BLD: 8.9 K/UL (ref 1.7–7.7)
NEUTROPHILS NFR BLD: 78 %
PERFORMED ON: ABNORMAL
PLATELET # BLD AUTO: 292 K/UL (ref 135–450)
PMV BLD AUTO: 8.4 FL (ref 5–10.5)
POTASSIUM SERPL-SCNC: 5.1 MMOL/L (ref 3.5–5.1)
RBC # BLD AUTO: 3.89 M/UL (ref 4.2–5.9)
SODIUM SERPL-SCNC: 128 MMOL/L (ref 136–145)
WBC # BLD AUTO: 11.4 K/UL (ref 4–11)

## 2024-03-29 PROCEDURE — 2060000000 HC ICU INTERMEDIATE R&B

## 2024-03-29 PROCEDURE — 2500000003 HC RX 250 WO HCPCS: Performed by: INTERNAL MEDICINE

## 2024-03-29 PROCEDURE — 80048 BASIC METABOLIC PNL TOTAL CA: CPT

## 2024-03-29 PROCEDURE — 6360000002 HC RX W HCPCS: Performed by: INTERNAL MEDICINE

## 2024-03-29 PROCEDURE — 85025 COMPLETE CBC W/AUTO DIFF WBC: CPT

## 2024-03-29 PROCEDURE — 90935 HEMODIALYSIS ONE EVALUATION: CPT

## 2024-03-29 PROCEDURE — 2580000003 HC RX 258: Performed by: INTERNAL MEDICINE

## 2024-03-29 PROCEDURE — 2700000000 HC OXYGEN THERAPY PER DAY

## 2024-03-29 PROCEDURE — 6370000000 HC RX 637 (ALT 250 FOR IP): Performed by: INTERNAL MEDICINE

## 2024-03-29 PROCEDURE — 36415 COLL VENOUS BLD VENIPUNCTURE: CPT

## 2024-03-29 PROCEDURE — 6370000000 HC RX 637 (ALT 250 FOR IP): Performed by: NURSE PRACTITIONER

## 2024-03-29 PROCEDURE — 94640 AIRWAY INHALATION TREATMENT: CPT

## 2024-03-29 PROCEDURE — 83735 ASSAY OF MAGNESIUM: CPT

## 2024-03-29 PROCEDURE — 94761 N-INVAS EAR/PLS OXIMETRY MLT: CPT

## 2024-03-29 PROCEDURE — P9047 ALBUMIN (HUMAN), 25%, 50ML: HCPCS

## 2024-03-29 PROCEDURE — 6360000002 HC RX W HCPCS

## 2024-03-29 PROCEDURE — 94660 CPAP INITIATION&MGMT: CPT

## 2024-03-29 RX ORDER — CALCIUM ACETATE 667 MG/1
2 CAPSULE ORAL
Status: DISCONTINUED | OUTPATIENT
Start: 2024-03-29 | End: 2024-04-11 | Stop reason: HOSPADM

## 2024-03-29 RX ORDER — HEPARIN SODIUM 1000 [USP'U]/ML
INJECTION, SOLUTION INTRAVENOUS; SUBCUTANEOUS
Status: COMPLETED
Start: 2024-03-29 | End: 2024-03-29

## 2024-03-29 RX ORDER — ALBUMIN (HUMAN) 12.5 G/50ML
25 SOLUTION INTRAVENOUS ONCE
Status: COMPLETED | OUTPATIENT
Start: 2024-03-29 | End: 2024-03-29

## 2024-03-29 RX ORDER — ALBUMIN (HUMAN) 12.5 G/50ML
SOLUTION INTRAVENOUS
Status: COMPLETED
Start: 2024-03-29 | End: 2024-03-29

## 2024-03-29 RX ORDER — LOPERAMIDE HYDROCHLORIDE 2 MG/1
2 CAPSULE ORAL 4 TIMES DAILY PRN
Status: DISCONTINUED | OUTPATIENT
Start: 2024-03-29 | End: 2024-04-11 | Stop reason: HOSPADM

## 2024-03-29 RX ADMIN — VANCOMYCIN HYDROCHLORIDE 750 MG: 750 INJECTION, POWDER, LYOPHILIZED, FOR SOLUTION INTRAVENOUS at 11:20

## 2024-03-29 RX ADMIN — CALCIUM ACETATE 1334 MG: 667 CAPSULE ORAL at 16:02

## 2024-03-29 RX ADMIN — LOPERAMIDE HYDROCHLORIDE 2 MG: 2 CAPSULE ORAL at 01:17

## 2024-03-29 RX ADMIN — IPRATROPIUM BROMIDE AND ALBUTEROL SULFATE 1 DOSE: 2.5; .5 SOLUTION RESPIRATORY (INHALATION) at 07:52

## 2024-03-29 RX ADMIN — ALBUMIN (HUMAN) 25 G: 0.25 INJECTION, SOLUTION INTRAVENOUS at 10:24

## 2024-03-29 RX ADMIN — ONDANSETRON 4 MG: 4 TABLET, ORALLY DISINTEGRATING ORAL at 10:56

## 2024-03-29 RX ADMIN — QUETIAPINE FUMARATE 50 MG: 25 TABLET ORAL at 21:16

## 2024-03-29 RX ADMIN — BUSPIRONE HYDROCHLORIDE 10 MG: 5 TABLET ORAL at 21:16

## 2024-03-29 RX ADMIN — SODIUM CHLORIDE, PRESERVATIVE FREE 10 ML: 5 INJECTION INTRAVENOUS at 21:15

## 2024-03-29 RX ADMIN — INSULIN LISPRO 4 UNITS: 100 INJECTION, SOLUTION INTRAVENOUS; SUBCUTANEOUS at 18:22

## 2024-03-29 RX ADMIN — DULOXETINE HYDROCHLORIDE 60 MG: 60 CAPSULE, DELAYED RELEASE ORAL at 16:02

## 2024-03-29 RX ADMIN — INSULIN GLARGINE 15 UNITS: 100 INJECTION, SOLUTION SUBCUTANEOUS at 21:17

## 2024-03-29 RX ADMIN — ALBUMIN (HUMAN) 25 G: 12.5 SOLUTION INTRAVENOUS at 10:24

## 2024-03-29 RX ADMIN — APIXABAN 5 MG: 5 TABLET, FILM COATED ORAL at 21:16

## 2024-03-29 RX ADMIN — TRAZODONE HYDROCHLORIDE 50 MG: 50 TABLET ORAL at 21:19

## 2024-03-29 RX ADMIN — IPRATROPIUM BROMIDE AND ALBUTEROL SULFATE 1 DOSE: 2.5; .5 SOLUTION RESPIRATORY (INHALATION) at 20:09

## 2024-03-29 RX ADMIN — PRAVASTATIN SODIUM 40 MG: 40 TABLET ORAL at 16:02

## 2024-03-29 RX ADMIN — HEPARIN SODIUM 3600 UNITS: 1000 INJECTION INTRAVENOUS; SUBCUTANEOUS at 11:22

## 2024-03-29 RX ADMIN — BUSPIRONE HYDROCHLORIDE 10 MG: 5 TABLET ORAL at 16:02

## 2024-03-29 ASSESSMENT — PAIN SCALES - GENERAL: PAINLEVEL_OUTOF10: 0

## 2024-03-29 NOTE — DIALYSIS
Treatment time: 3 hours  Net UF: 1000 ml     Pre weight: 92.5 kg  Post weight:91.5 kg  EDW: 94 kg, challenging     Access used: L TDC    Access function: well with   ml/min     Medications or blood products given: Vancomycin  750mg, albuterol 25gms IVPB once for symptomatic hypotension in the 90s and heparin dwells to line.     Regular outpatient schedule: MWF At [facility]     Summary of response to treatment: Patient tolerated treatment well fair, became lightheaded with SBP in 90s 15 minutes into tx, Dr. Tariq advised and albumin ordered, BP stabilized but remained low 100s.  Also c/o nausea and was given zofran and BS checked by floor RN.      Crit line:  Rapid decrease in crit line first 30 minutes of tx, UF decrease to minimum with symptomatic hypotension and then increased as tolerated post albumin, BP remained low 100s most of tx. Ending profile C.     Report given to Elsy Meier RN and copy of dialysis treatment record placed in chart, to be scanned into EMR.

## 2024-03-30 LAB
ANION GAP SERPL CALCULATED.3IONS-SCNC: 13 MMOL/L (ref 3–16)
BACTERIA BLD CULT: ABNORMAL
BACTERIA BLD CULT: ABNORMAL
BASOPHILS # BLD: 0.1 K/UL (ref 0–0.2)
BASOPHILS NFR BLD: 1.2 %
BUN SERPL-MCNC: 41 MG/DL (ref 7–20)
CALCIUM SERPL-MCNC: 9.5 MG/DL (ref 8.3–10.6)
CHLORIDE SERPL-SCNC: 92 MMOL/L (ref 99–110)
CO2 SERPL-SCNC: 26 MMOL/L (ref 21–32)
CREAT SERPL-MCNC: 5 MG/DL (ref 0.9–1.3)
DEPRECATED RDW RBC AUTO: 16.5 % (ref 12.4–15.4)
EOSINOPHIL # BLD: 0.6 K/UL (ref 0–0.6)
EOSINOPHIL NFR BLD: 6 %
GFR SERPLBLD CREATININE-BSD FMLA CKD-EPI: 13 ML/MIN/{1.73_M2}
GLUCOSE BLD-MCNC: 113 MG/DL (ref 70–99)
GLUCOSE BLD-MCNC: 129 MG/DL (ref 70–99)
GLUCOSE BLD-MCNC: 146 MG/DL (ref 70–99)
GLUCOSE BLD-MCNC: 203 MG/DL (ref 70–99)
GLUCOSE SERPL-MCNC: 118 MG/DL (ref 70–99)
HCT VFR BLD AUTO: 32.9 % (ref 40.5–52.5)
HGB BLD-MCNC: 10.5 G/DL (ref 13.5–17.5)
LYMPHOCYTES # BLD: 1.1 K/UL (ref 1–5.1)
LYMPHOCYTES NFR BLD: 11.6 %
MAGNESIUM SERPL-MCNC: 2.1 MG/DL (ref 1.8–2.4)
MCH RBC QN AUTO: 28.4 PG (ref 26–34)
MCHC RBC AUTO-ENTMCNC: 32 G/DL (ref 31–36)
MCV RBC AUTO: 88.8 FL (ref 80–100)
MONOCYTES # BLD: 0.7 K/UL (ref 0–1.3)
MONOCYTES NFR BLD: 8.1 %
NEUTROPHILS # BLD: 6.7 K/UL (ref 1.7–7.7)
NEUTROPHILS NFR BLD: 73.1 %
NT-PROBNP SERPL-MCNC: ABNORMAL PG/ML (ref 0–124)
ORGANISM: ABNORMAL
ORGANISM: ABNORMAL
PERFORMED ON: ABNORMAL
PLATELET # BLD AUTO: 282 K/UL (ref 135–450)
PMV BLD AUTO: 7.9 FL (ref 5–10.5)
POTASSIUM SERPL-SCNC: 4.7 MMOL/L (ref 3.5–5.1)
RBC # BLD AUTO: 3.7 M/UL (ref 4.2–5.9)
SODIUM SERPL-SCNC: 131 MMOL/L (ref 136–145)
WBC # BLD AUTO: 9.2 K/UL (ref 4–11)

## 2024-03-30 PROCEDURE — 6370000000 HC RX 637 (ALT 250 FOR IP): Performed by: INTERNAL MEDICINE

## 2024-03-30 PROCEDURE — 85025 COMPLETE CBC W/AUTO DIFF WBC: CPT

## 2024-03-30 PROCEDURE — 2700000000 HC OXYGEN THERAPY PER DAY

## 2024-03-30 PROCEDURE — 36415 COLL VENOUS BLD VENIPUNCTURE: CPT

## 2024-03-30 PROCEDURE — 2500000003 HC RX 250 WO HCPCS: Performed by: INTERNAL MEDICINE

## 2024-03-30 PROCEDURE — 94660 CPAP INITIATION&MGMT: CPT

## 2024-03-30 PROCEDURE — 83880 ASSAY OF NATRIURETIC PEPTIDE: CPT

## 2024-03-30 PROCEDURE — 94761 N-INVAS EAR/PLS OXIMETRY MLT: CPT

## 2024-03-30 PROCEDURE — 80048 BASIC METABOLIC PNL TOTAL CA: CPT

## 2024-03-30 PROCEDURE — 2580000003 HC RX 258: Performed by: INTERNAL MEDICINE

## 2024-03-30 PROCEDURE — 97530 THERAPEUTIC ACTIVITIES: CPT

## 2024-03-30 PROCEDURE — 2060000000 HC ICU INTERMEDIATE R&B

## 2024-03-30 PROCEDURE — 83735 ASSAY OF MAGNESIUM: CPT

## 2024-03-30 PROCEDURE — 97162 PT EVAL MOD COMPLEX 30 MIN: CPT

## 2024-03-30 PROCEDURE — 97110 THERAPEUTIC EXERCISES: CPT

## 2024-03-30 RX ADMIN — SACUBITRIL AND VALSARTAN 1 TABLET: 24; 26 TABLET, FILM COATED ORAL at 20:21

## 2024-03-30 RX ADMIN — APIXABAN 5 MG: 5 TABLET, FILM COATED ORAL at 20:21

## 2024-03-30 RX ADMIN — CALCIUM ACETATE 1334 MG: 667 CAPSULE ORAL at 18:37

## 2024-03-30 RX ADMIN — APIXABAN 5 MG: 5 TABLET, FILM COATED ORAL at 08:43

## 2024-03-30 RX ADMIN — BUSPIRONE HYDROCHLORIDE 10 MG: 5 TABLET ORAL at 14:35

## 2024-03-30 RX ADMIN — CALCIUM ACETATE 1334 MG: 667 CAPSULE ORAL at 12:43

## 2024-03-30 RX ADMIN — SPIRONOLACTONE 12.5 MG: 25 TABLET ORAL at 08:42

## 2024-03-30 RX ADMIN — SODIUM CHLORIDE, PRESERVATIVE FREE 10 ML: 5 INJECTION INTRAVENOUS at 08:44

## 2024-03-30 RX ADMIN — SODIUM CHLORIDE, PRESERVATIVE FREE 10 ML: 5 INJECTION INTRAVENOUS at 20:24

## 2024-03-30 RX ADMIN — PRAVASTATIN SODIUM 40 MG: 40 TABLET ORAL at 08:41

## 2024-03-30 RX ADMIN — INSULIN GLARGINE 15 UNITS: 100 INJECTION, SOLUTION SUBCUTANEOUS at 20:23

## 2024-03-30 RX ADMIN — DULOXETINE HYDROCHLORIDE 60 MG: 60 CAPSULE, DELAYED RELEASE ORAL at 08:42

## 2024-03-30 RX ADMIN — OXYCODONE AND ACETAMINOPHEN 1 TABLET: 5; 325 TABLET ORAL at 08:45

## 2024-03-30 RX ADMIN — QUETIAPINE FUMARATE 50 MG: 25 TABLET ORAL at 20:21

## 2024-03-30 RX ADMIN — TRAZODONE HYDROCHLORIDE 50 MG: 50 TABLET ORAL at 20:21

## 2024-03-30 RX ADMIN — BUSPIRONE HYDROCHLORIDE 10 MG: 5 TABLET ORAL at 20:22

## 2024-03-30 RX ADMIN — SACUBITRIL AND VALSARTAN 1 TABLET: 24; 26 TABLET, FILM COATED ORAL at 08:42

## 2024-03-30 RX ADMIN — OXYCODONE AND ACETAMINOPHEN 1 TABLET: 5; 325 TABLET ORAL at 20:21

## 2024-03-30 RX ADMIN — CALCIUM ACETATE 1334 MG: 667 CAPSULE ORAL at 08:41

## 2024-03-30 RX ADMIN — BUSPIRONE HYDROCHLORIDE 10 MG: 5 TABLET ORAL at 08:43

## 2024-03-30 ASSESSMENT — PAIN DESCRIPTION - DESCRIPTORS: DESCRIPTORS: SORE;ACHING

## 2024-03-30 ASSESSMENT — PAIN DESCRIPTION - PAIN TYPE
TYPE: CHRONIC PAIN
TYPE: CHRONIC PAIN

## 2024-03-30 ASSESSMENT — PAIN SCALES - GENERAL
PAINLEVEL_OUTOF10: 7
PAINLEVEL_OUTOF10: 0
PAINLEVEL_OUTOF10: 8

## 2024-03-30 ASSESSMENT — PAIN DESCRIPTION - ORIENTATION: ORIENTATION: MID;LOWER

## 2024-03-30 ASSESSMENT — PAIN DESCRIPTION - LOCATION
LOCATION: BACK;HIP
LOCATION: BACK;HIP

## 2024-03-31 LAB
ANION GAP SERPL CALCULATED.3IONS-SCNC: 14 MMOL/L (ref 3–16)
BACTERIA BLD CULT ORG #2: NORMAL
BUN SERPL-MCNC: 60 MG/DL (ref 7–20)
CALCIUM SERPL-MCNC: 9.4 MG/DL (ref 8.3–10.6)
CHLORIDE SERPL-SCNC: 91 MMOL/L (ref 99–110)
CO2 SERPL-SCNC: 26 MMOL/L (ref 21–32)
CREAT SERPL-MCNC: 6.7 MG/DL (ref 0.9–1.3)
GFR SERPLBLD CREATININE-BSD FMLA CKD-EPI: 9 ML/MIN/{1.73_M2}
GLUCOSE BLD-MCNC: 109 MG/DL (ref 70–99)
GLUCOSE BLD-MCNC: 180 MG/DL (ref 70–99)
GLUCOSE BLD-MCNC: 195 MG/DL (ref 70–99)
GLUCOSE BLD-MCNC: 217 MG/DL (ref 70–99)
GLUCOSE SERPL-MCNC: 103 MG/DL (ref 70–99)
MAGNESIUM SERPL-MCNC: 2 MG/DL (ref 1.8–2.4)
PERFORMED ON: ABNORMAL
POTASSIUM SERPL-SCNC: 5 MMOL/L (ref 3.5–5.1)
SODIUM SERPL-SCNC: 131 MMOL/L (ref 136–145)

## 2024-03-31 PROCEDURE — 6370000000 HC RX 637 (ALT 250 FOR IP): Performed by: INTERNAL MEDICINE

## 2024-03-31 PROCEDURE — 94640 AIRWAY INHALATION TREATMENT: CPT

## 2024-03-31 PROCEDURE — 83735 ASSAY OF MAGNESIUM: CPT

## 2024-03-31 PROCEDURE — 97165 OT EVAL LOW COMPLEX 30 MIN: CPT

## 2024-03-31 PROCEDURE — 2700000000 HC OXYGEN THERAPY PER DAY

## 2024-03-31 PROCEDURE — 94761 N-INVAS EAR/PLS OXIMETRY MLT: CPT

## 2024-03-31 PROCEDURE — 80048 BASIC METABOLIC PNL TOTAL CA: CPT

## 2024-03-31 PROCEDURE — 36415 COLL VENOUS BLD VENIPUNCTURE: CPT

## 2024-03-31 PROCEDURE — 2060000000 HC ICU INTERMEDIATE R&B

## 2024-03-31 PROCEDURE — 94660 CPAP INITIATION&MGMT: CPT

## 2024-03-31 PROCEDURE — 2580000003 HC RX 258: Performed by: INTERNAL MEDICINE

## 2024-03-31 PROCEDURE — 97110 THERAPEUTIC EXERCISES: CPT

## 2024-03-31 PROCEDURE — 2500000003 HC RX 250 WO HCPCS: Performed by: INTERNAL MEDICINE

## 2024-03-31 RX ADMIN — SACUBITRIL AND VALSARTAN 1 TABLET: 24; 26 TABLET, FILM COATED ORAL at 21:47

## 2024-03-31 RX ADMIN — BUSPIRONE HYDROCHLORIDE 10 MG: 5 TABLET ORAL at 15:37

## 2024-03-31 RX ADMIN — CALCIUM ACETATE 1334 MG: 667 CAPSULE ORAL at 15:37

## 2024-03-31 RX ADMIN — IPRATROPIUM BROMIDE AND ALBUTEROL SULFATE 1 DOSE: 2.5; .5 SOLUTION RESPIRATORY (INHALATION) at 07:38

## 2024-03-31 RX ADMIN — METOPROLOL SUCCINATE 25 MG: 25 TABLET, FILM COATED, EXTENDED RELEASE ORAL at 09:26

## 2024-03-31 RX ADMIN — APIXABAN 5 MG: 5 TABLET, FILM COATED ORAL at 21:47

## 2024-03-31 RX ADMIN — DULOXETINE HYDROCHLORIDE 60 MG: 60 CAPSULE, DELAYED RELEASE ORAL at 09:27

## 2024-03-31 RX ADMIN — IPRATROPIUM BROMIDE AND ALBUTEROL SULFATE 1 DOSE: 2.5; .5 SOLUTION RESPIRATORY (INHALATION) at 20:11

## 2024-03-31 RX ADMIN — SODIUM CHLORIDE, PRESERVATIVE FREE 10 ML: 5 INJECTION INTRAVENOUS at 09:27

## 2024-03-31 RX ADMIN — INSULIN GLARGINE 15 UNITS: 100 INJECTION, SOLUTION SUBCUTANEOUS at 21:49

## 2024-03-31 RX ADMIN — BUSPIRONE HYDROCHLORIDE 10 MG: 5 TABLET ORAL at 09:27

## 2024-03-31 RX ADMIN — SACUBITRIL AND VALSARTAN 1 TABLET: 24; 26 TABLET, FILM COATED ORAL at 09:27

## 2024-03-31 RX ADMIN — OXYCODONE AND ACETAMINOPHEN 1 TABLET: 5; 325 TABLET ORAL at 21:47

## 2024-03-31 RX ADMIN — SPIRONOLACTONE 12.5 MG: 25 TABLET ORAL at 09:26

## 2024-03-31 RX ADMIN — BUSPIRONE HYDROCHLORIDE 10 MG: 5 TABLET ORAL at 21:47

## 2024-03-31 RX ADMIN — CALCIUM ACETATE 1334 MG: 667 CAPSULE ORAL at 11:47

## 2024-03-31 RX ADMIN — CALCIUM ACETATE 1334 MG: 667 CAPSULE ORAL at 09:27

## 2024-03-31 RX ADMIN — APIXABAN 5 MG: 5 TABLET, FILM COATED ORAL at 09:26

## 2024-03-31 RX ADMIN — PRAVASTATIN SODIUM 40 MG: 40 TABLET ORAL at 09:26

## 2024-03-31 RX ADMIN — TRAZODONE HYDROCHLORIDE 50 MG: 50 TABLET ORAL at 21:47

## 2024-03-31 RX ADMIN — OXYCODONE AND ACETAMINOPHEN 1 TABLET: 5; 325 TABLET ORAL at 09:40

## 2024-03-31 RX ADMIN — QUETIAPINE FUMARATE 50 MG: 25 TABLET ORAL at 21:47

## 2024-03-31 ASSESSMENT — PAIN DESCRIPTION - ORIENTATION
ORIENTATION: RIGHT;LEFT
ORIENTATION: MID;LOWER

## 2024-03-31 ASSESSMENT — PAIN SCALES - GENERAL
PAINLEVEL_OUTOF10: 7
PAINLEVEL_OUTOF10: 7

## 2024-03-31 ASSESSMENT — PAIN DESCRIPTION - LOCATION
LOCATION: BACK;HIP
LOCATION: BACK;HIP

## 2024-03-31 ASSESSMENT — PAIN DESCRIPTION - DESCRIPTORS
DESCRIPTORS: NAGGING;ACHING
DESCRIPTORS: ACHING;NAGGING

## 2024-03-31 ASSESSMENT — PAIN DESCRIPTION - PAIN TYPE: TYPE: CHRONIC PAIN

## 2024-04-01 ENCOUNTER — APPOINTMENT (OUTPATIENT)
Dept: VASCULAR LAB | Age: 54
DRG: 305 | End: 2024-04-01
Payer: MEDICAID

## 2024-04-01 LAB
BACTERIA BLD CULT: NORMAL
GLUCOSE BLD-MCNC: 103 MG/DL (ref 70–99)
GLUCOSE BLD-MCNC: 127 MG/DL (ref 70–99)
GLUCOSE BLD-MCNC: 137 MG/DL (ref 70–99)
GLUCOSE BLD-MCNC: 169 MG/DL (ref 70–99)
PERFORMED ON: ABNORMAL

## 2024-04-01 PROCEDURE — 94640 AIRWAY INHALATION TREATMENT: CPT

## 2024-04-01 PROCEDURE — 6370000000 HC RX 637 (ALT 250 FOR IP): Performed by: INTERNAL MEDICINE

## 2024-04-01 PROCEDURE — 97530 THERAPEUTIC ACTIVITIES: CPT

## 2024-04-01 PROCEDURE — 94761 N-INVAS EAR/PLS OXIMETRY MLT: CPT

## 2024-04-01 PROCEDURE — 6360000002 HC RX W HCPCS

## 2024-04-01 PROCEDURE — 2580000003 HC RX 258: Performed by: INTERNAL MEDICINE

## 2024-04-01 PROCEDURE — 2700000000 HC OXYGEN THERAPY PER DAY

## 2024-04-01 PROCEDURE — 99231 SBSQ HOSP IP/OBS SF/LOW 25: CPT | Performed by: CLINICAL NURSE SPECIALIST

## 2024-04-01 PROCEDURE — 97110 THERAPEUTIC EXERCISES: CPT

## 2024-04-01 PROCEDURE — 93925 LOWER EXTREMITY STUDY: CPT

## 2024-04-01 PROCEDURE — 6370000000 HC RX 637 (ALT 250 FOR IP): Performed by: NURSE PRACTITIONER

## 2024-04-01 PROCEDURE — 90935 HEMODIALYSIS ONE EVALUATION: CPT

## 2024-04-01 PROCEDURE — 2500000003 HC RX 250 WO HCPCS: Performed by: INTERNAL MEDICINE

## 2024-04-01 PROCEDURE — 2060000000 HC ICU INTERMEDIATE R&B

## 2024-04-01 PROCEDURE — 93306 TTE W/DOPPLER COMPLETE: CPT

## 2024-04-01 RX ORDER — HEPARIN SODIUM 1000 [USP'U]/ML
INJECTION, SOLUTION INTRAVENOUS; SUBCUTANEOUS
Status: COMPLETED
Start: 2024-04-01 | End: 2024-04-01

## 2024-04-01 RX ORDER — CALCIUM CARBONATE 500 MG/1
500 TABLET, CHEWABLE ORAL 3 TIMES DAILY PRN
Status: DISCONTINUED | OUTPATIENT
Start: 2024-04-01 | End: 2024-04-11 | Stop reason: HOSPADM

## 2024-04-01 RX ADMIN — ONDANSETRON 4 MG: 4 TABLET, ORALLY DISINTEGRATING ORAL at 07:38

## 2024-04-01 RX ADMIN — CALCIUM ACETATE 1334 MG: 667 CAPSULE ORAL at 12:52

## 2024-04-01 RX ADMIN — CALCIUM CARBONATE 500 MG: 500 TABLET, CHEWABLE ORAL at 12:52

## 2024-04-01 RX ADMIN — METOPROLOL SUCCINATE 25 MG: 25 TABLET, FILM COATED, EXTENDED RELEASE ORAL at 08:54

## 2024-04-01 RX ADMIN — CALCIUM ACETATE 1334 MG: 667 CAPSULE ORAL at 08:54

## 2024-04-01 RX ADMIN — CALCIUM CARBONATE 500 MG: 500 TABLET, CHEWABLE ORAL at 04:44

## 2024-04-01 RX ADMIN — SPIRONOLACTONE 12.5 MG: 25 TABLET ORAL at 08:54

## 2024-04-01 RX ADMIN — SODIUM CHLORIDE, PRESERVATIVE FREE 10 ML: 5 INJECTION INTRAVENOUS at 21:01

## 2024-04-01 RX ADMIN — BUSPIRONE HYDROCHLORIDE 10 MG: 5 TABLET ORAL at 08:55

## 2024-04-01 RX ADMIN — HEPARIN SODIUM 3600 UNITS: 1000 INJECTION, SOLUTION INTRAVENOUS; SUBCUTANEOUS at 20:06

## 2024-04-01 RX ADMIN — OXYCODONE AND ACETAMINOPHEN 1 TABLET: 5; 325 TABLET ORAL at 12:52

## 2024-04-01 RX ADMIN — TRAZODONE HYDROCHLORIDE 50 MG: 50 TABLET ORAL at 21:00

## 2024-04-01 RX ADMIN — PRAVASTATIN SODIUM 40 MG: 40 TABLET ORAL at 08:54

## 2024-04-01 RX ADMIN — SODIUM CHLORIDE, PRESERVATIVE FREE 10 ML: 5 INJECTION INTRAVENOUS at 08:56

## 2024-04-01 RX ADMIN — IPRATROPIUM BROMIDE AND ALBUTEROL SULFATE 1 DOSE: 2.5; .5 SOLUTION RESPIRATORY (INHALATION) at 08:22

## 2024-04-01 RX ADMIN — APIXABAN 5 MG: 5 TABLET, FILM COATED ORAL at 08:54

## 2024-04-01 RX ADMIN — DULOXETINE HYDROCHLORIDE 60 MG: 60 CAPSULE, DELAYED RELEASE ORAL at 08:54

## 2024-04-01 RX ADMIN — QUETIAPINE FUMARATE 50 MG: 25 TABLET ORAL at 21:00

## 2024-04-01 RX ADMIN — BUSPIRONE HYDROCHLORIDE 10 MG: 5 TABLET ORAL at 21:01

## 2024-04-01 RX ADMIN — SACUBITRIL AND VALSARTAN 1 TABLET: 24; 26 TABLET, FILM COATED ORAL at 08:54

## 2024-04-01 RX ADMIN — SACUBITRIL AND VALSARTAN 1 TABLET: 24; 26 TABLET, FILM COATED ORAL at 21:00

## 2024-04-01 RX ADMIN — HEPARIN SODIUM 3600 UNITS: 1000 INJECTION INTRAVENOUS; SUBCUTANEOUS at 20:06

## 2024-04-01 ASSESSMENT — PAIN SCALES - GENERAL
PAINLEVEL_OUTOF10: 8
PAINLEVEL_OUTOF10: 4
PAINLEVEL_OUTOF10: 8

## 2024-04-01 ASSESSMENT — PAIN DESCRIPTION - LOCATION
LOCATION: BACK
LOCATION: BACK;HIP

## 2024-04-01 ASSESSMENT — PAIN DESCRIPTION - PAIN TYPE
TYPE: CHRONIC PAIN
TYPE: CHRONIC PAIN

## 2024-04-02 ENCOUNTER — APPOINTMENT (OUTPATIENT)
Dept: CT IMAGING | Age: 54
DRG: 305 | End: 2024-04-02
Payer: MEDICAID

## 2024-04-02 ENCOUNTER — APPOINTMENT (OUTPATIENT)
Dept: GENERAL RADIOLOGY | Age: 54
DRG: 305 | End: 2024-04-02
Payer: MEDICAID

## 2024-04-02 LAB
GLUCOSE BLD-MCNC: 115 MG/DL (ref 70–99)
GLUCOSE BLD-MCNC: 118 MG/DL (ref 70–99)
GLUCOSE BLD-MCNC: 121 MG/DL (ref 70–99)
GLUCOSE BLD-MCNC: 124 MG/DL (ref 70–99)
NT-PROBNP SERPL-MCNC: ABNORMAL PG/ML (ref 0–124)
PERFORMED ON: ABNORMAL

## 2024-04-02 PROCEDURE — 36415 COLL VENOUS BLD VENIPUNCTURE: CPT

## 2024-04-02 PROCEDURE — 2500000003 HC RX 250 WO HCPCS: Performed by: INTERNAL MEDICINE

## 2024-04-02 PROCEDURE — 97116 GAIT TRAINING THERAPY: CPT

## 2024-04-02 PROCEDURE — 83880 ASSAY OF NATRIURETIC PEPTIDE: CPT

## 2024-04-02 PROCEDURE — 97530 THERAPEUTIC ACTIVITIES: CPT

## 2024-04-02 PROCEDURE — 94761 N-INVAS EAR/PLS OXIMETRY MLT: CPT

## 2024-04-02 PROCEDURE — 6360000004 HC RX CONTRAST MEDICATION: Performed by: SURGERY

## 2024-04-02 PROCEDURE — 75635 CT ANGIO ABDOMINAL ARTERIES: CPT

## 2024-04-02 PROCEDURE — 6370000000 HC RX 637 (ALT 250 FOR IP): Performed by: NURSE PRACTITIONER

## 2024-04-02 PROCEDURE — 73630 X-RAY EXAM OF FOOT: CPT

## 2024-04-02 PROCEDURE — 2700000000 HC OXYGEN THERAPY PER DAY

## 2024-04-02 PROCEDURE — 2060000000 HC ICU INTERMEDIATE R&B

## 2024-04-02 PROCEDURE — 6370000000 HC RX 637 (ALT 250 FOR IP): Performed by: INTERNAL MEDICINE

## 2024-04-02 PROCEDURE — 97535 SELF CARE MNGMENT TRAINING: CPT

## 2024-04-02 RX ORDER — FAMOTIDINE 20 MG/1
10 TABLET, FILM COATED ORAL DAILY PRN
Status: DISCONTINUED | OUTPATIENT
Start: 2024-04-02 | End: 2024-04-11 | Stop reason: HOSPADM

## 2024-04-02 RX ORDER — CALCIUM CARBONATE 500 MG/1
500 TABLET, CHEWABLE ORAL ONCE
Status: DISCONTINUED | OUTPATIENT
Start: 2024-04-02 | End: 2024-04-06

## 2024-04-02 RX ORDER — IPRATROPIUM BROMIDE AND ALBUTEROL SULFATE 2.5; .5 MG/3ML; MG/3ML
1 SOLUTION RESPIRATORY (INHALATION) EVERY 4 HOURS PRN
Status: DISCONTINUED | OUTPATIENT
Start: 2024-04-02 | End: 2024-04-11 | Stop reason: HOSPADM

## 2024-04-02 RX ADMIN — INSULIN GLARGINE 15 UNITS: 100 INJECTION, SOLUTION SUBCUTANEOUS at 20:28

## 2024-04-02 RX ADMIN — QUETIAPINE FUMARATE 50 MG: 25 TABLET ORAL at 20:26

## 2024-04-02 RX ADMIN — BUSPIRONE HYDROCHLORIDE 10 MG: 5 TABLET ORAL at 20:27

## 2024-04-02 RX ADMIN — SACUBITRIL AND VALSARTAN 1 TABLET: 24; 26 TABLET, FILM COATED ORAL at 20:27

## 2024-04-02 RX ADMIN — BUSPIRONE HYDROCHLORIDE 10 MG: 5 TABLET ORAL at 18:35

## 2024-04-02 RX ADMIN — TRAZODONE HYDROCHLORIDE 50 MG: 50 TABLET ORAL at 20:26

## 2024-04-02 RX ADMIN — CALCIUM CARBONATE 500 MG: 500 TABLET, CHEWABLE ORAL at 08:15

## 2024-04-02 RX ADMIN — CALCIUM ACETATE 1334 MG: 667 CAPSULE ORAL at 18:35

## 2024-04-02 RX ADMIN — CALCIUM CARBONATE 500 MG: 500 TABLET, CHEWABLE ORAL at 03:00

## 2024-04-02 RX ADMIN — IOPAMIDOL 110 ML: 755 INJECTION, SOLUTION INTRAVENOUS at 11:13

## 2024-04-02 RX ADMIN — ONDANSETRON 4 MG: 4 TABLET, ORALLY DISINTEGRATING ORAL at 08:15

## 2024-04-02 RX ADMIN — APIXABAN 5 MG: 5 TABLET, FILM COATED ORAL at 20:27

## 2024-04-02 ASSESSMENT — PAIN DESCRIPTION - DESCRIPTORS: DESCRIPTORS: ACHING

## 2024-04-02 ASSESSMENT — PAIN SCALES - GENERAL: PAINLEVEL_OUTOF10: 3

## 2024-04-02 ASSESSMENT — PAIN DESCRIPTION - ORIENTATION: ORIENTATION: LEFT;RIGHT

## 2024-04-02 ASSESSMENT — PAIN DESCRIPTION - PAIN TYPE: TYPE: CHRONIC PAIN

## 2024-04-02 ASSESSMENT — PAIN DESCRIPTION - LOCATION: LOCATION: BACK

## 2024-04-02 NOTE — RT PROTOCOL NOTE
RT Inhaler-Nebulizer Bronchodilator Protocol Note    There is a bronchodilator order in the chart from a provider indicating to follow the RT Bronchodilator Protocol and there is an “Initiate RT Inhaler-Nebulizer Bronchodilator Protocol” order as well (see protocol at bottom of note).    CXR Findings:  No results found.    The findings from the last RT Protocol Assessment were as follows:   History Pulmonary Disease: Chronic pulmonary disease  Respiratory Pattern: Regular pattern and RR 12-20 bpm  Breath Sounds: Clear breath sounds  Cough: Strong, spontaneous, non-productive  Indication for Bronchodilator Therapy: On home bronchodilators  Bronchodilator Assessment Score: 2    Aerosolized bronchodilator medication orders have been revised according to the RT Inhaler-Nebulizer Bronchodilator Protocol below.    Respiratory Therapist to perform RT Therapy Protocol Assessment initially then follow the protocol.  Repeat RT Therapy Protocol Assessment PRN for score 0-3 or on second treatment, BID, and PRN for scores above 3.    No Indications - adjust the frequency to every 6 hours PRN wheezing or bronchospasm, if no treatments needed after 48 hours then discontinue using Per Protocol order mode.     If indication present, adjust the RT bronchodilator orders based on the Bronchodilator Assessment Score as indicated below.  Use Inhaler orders unless patient has one or more of the following: on home nebulizer, not able to hold breath for 10 seconds, is not alert and oriented, cannot activate and use MDI correctly, or respiratory rate 25 breaths per minute or more, then use the equivalent nebulizer order(s) with same Frequency and PRN reasons based on the score.  If a patient is on this medication at home then do not decrease Frequency below that used at home.    0-3 - enter or revise RT bronchodilator order(s) to equivalent RT Bronchodilator order with Frequency of every 4 hours PRN for wheezing or increased work of 
RT Inhaler-Nebulizer Bronchodilator Protocol Note    There is a bronchodilator order in the chart from a provider indicating to follow the RT Bronchodilator Protocol and there is an “Initiate RT Inhaler-Nebulizer Bronchodilator Protocol” order as well (see protocol at bottom of note).    CXR Findings:  No results found.    The findings from the last RT Protocol Assessment were as follows:   History Pulmonary Disease: Chronic pulmonary disease  Respiratory Pattern: Regular pattern and RR 12-20 bpm  Breath Sounds: Slightly diminished and/or crackles  Cough: Strong, spontaneous, non-productive  Indication for Bronchodilator Therapy: On home bronchodilators  Bronchodilator Assessment Score: 4    Aerosolized bronchodilator medication orders have been revised according to the RT Inhaler-Nebulizer Bronchodilator Protocol below.    Respiratory Therapist to perform RT Therapy Protocol Assessment initially then follow the protocol.  Repeat RT Therapy Protocol Assessment PRN for score 0-3 or on second treatment, BID, and PRN for scores above 3.    No Indications - adjust the frequency to every 6 hours PRN wheezing or bronchospasm, if no treatments needed after 48 hours then discontinue using Per Protocol order mode.     If indication present, adjust the RT bronchodilator orders based on the Bronchodilator Assessment Score as indicated below.  Use Inhaler orders unless patient has one or more of the following: on home nebulizer, not able to hold breath for 10 seconds, is not alert and oriented, cannot activate and use MDI correctly, or respiratory rate 25 breaths per minute or more, then use the equivalent nebulizer order(s) with same Frequency and PRN reasons based on the score.  If a patient is on this medication at home then do not decrease Frequency below that used at home.    0-3 - enter or revise RT bronchodilator order(s) to equivalent RT Bronchodilator order with Frequency of every 4 hours PRN for wheezing or increased 
RT Inhaler-Nebulizer Bronchodilator Protocol Note    There is a bronchodilator order in the chart from a provider indicating to follow the RT Bronchodilator Protocol and there is an “Initiate RT Inhaler-Nebulizer Bronchodilator Protocol” order as well (see protocol at bottom of note).    CXR Findings:  No results found.    The findings from the last RT Protocol Assessment were as follows:   History Pulmonary Disease: Chronic pulmonary disease  Respiratory Pattern: Regular pattern and RR 12-20 bpm  Breath Sounds: Slightly diminished and/or crackles  Cough: Strong, spontaneous, non-productive  Indication for Bronchodilator Therapy: On home bronchodilators  Bronchodilator Assessment Score: 4    Aerosolized bronchodilator medication orders have been revised according to the RT Inhaler-Nebulizer Bronchodilator Protocol below.    Respiratory Therapist to perform RT Therapy Protocol Assessment initially then follow the protocol.  Repeat RT Therapy Protocol Assessment PRN for score 0-3 or on second treatment, BID, and PRN for scores above 3.    No Indications - adjust the frequency to every 6 hours PRN wheezing or bronchospasm, if no treatments needed after 48 hours then discontinue using Per Protocol order mode.     If indication present, adjust the RT bronchodilator orders based on the Bronchodilator Assessment Score as indicated below.  Use Inhaler orders unless patient has one or more of the following: on home nebulizer, not able to hold breath for 10 seconds, is not alert and oriented, cannot activate and use MDI correctly, or respiratory rate 25 breaths per minute or more, then use the equivalent nebulizer order(s) with same Frequency and PRN reasons based on the score.  If a patient is on this medication at home then do not decrease Frequency below that used at home.    0-3 - enter or revise RT bronchodilator order(s) to equivalent RT Bronchodilator order with Frequency of every 4 hours PRN for wheezing or increased 
RT Inhaler-Nebulizer Bronchodilator Protocol Note    There is a bronchodilator order in the chart from a provider indicating to follow the RT Bronchodilator Protocol and there is an “Initiate RT Inhaler-Nebulizer Bronchodilator Protocol” order as well (see protocol at bottom of note).    CXR Findings:  No results found.    The findings from the last RT Protocol Assessment were as follows:   History Pulmonary Disease: Smoker 15 pack years or more  Respiratory Pattern: Dyspnea on exertion or RR 21-25 bpm  Breath Sounds: Slightly diminished and/or crackles  Cough: Strong, spontaneous, non-productive  Indication for Bronchodilator Therapy: On home bronchodilators  Bronchodilator Assessment Score: 5    Aerosolized bronchodilator medication orders have been revised according to the RT Inhaler-Nebulizer Bronchodilator Protocol below.    Respiratory Therapist to perform RT Therapy Protocol Assessment initially then follow the protocol.  Repeat RT Therapy Protocol Assessment PRN for score 0-3 or on second treatment, BID, and PRN for scores above 3.    No Indications - adjust the frequency to every 6 hours PRN wheezing or bronchospasm, if no treatments needed after 48 hours then discontinue using Per Protocol order mode.     If indication present, adjust the RT bronchodilator orders based on the Bronchodilator Assessment Score as indicated below.  Use Inhaler orders unless patient has one or more of the following: on home nebulizer, not able to hold breath for 10 seconds, is not alert and oriented, cannot activate and use MDI correctly, or respiratory rate 25 breaths per minute or more, then use the equivalent nebulizer order(s) with same Frequency and PRN reasons based on the score.  If a patient is on this medication at home then do not decrease Frequency below that used at home.    0-3 - enter or revise RT bronchodilator order(s) to equivalent RT Bronchodilator order with Frequency of every 4 hours PRN for wheezing or 
RT Inhaler-Nebulizer Bronchodilator Protocol Note    There is a bronchodilator order in the chart from a provider indicating to follow the RT Bronchodilator Protocol and there is an “Initiate RT Inhaler-Nebulizer Bronchodilator Protocol” order as well (see protocol at bottom of note).    CXR Findings:  No results found.    The findings from the last RT Protocol Assessment were as follows:   History Pulmonary Disease: Smoker 15 pack years or more  Respiratory Pattern: Regular pattern and RR 12-20 bpm  Breath Sounds: Slightly diminished and/or crackles  Cough: Strong, spontaneous, non-productive  Indication for Bronchodilator Therapy: None  Bronchodilator Assessment Score: 3    Aerosolized bronchodilator medication orders have been revised according to the RT Inhaler-Nebulizer Bronchodilator Protocol below.    Respiratory Therapist to perform RT Therapy Protocol Assessment initially then follow the protocol.  Repeat RT Therapy Protocol Assessment PRN for score 0-3 or on second treatment, BID, and PRN for scores above 3.    No Indications - adjust the frequency to every 6 hours PRN wheezing or bronchospasm, if no treatments needed after 48 hours then discontinue using Per Protocol order mode.     If indication present, adjust the RT bronchodilator orders based on the Bronchodilator Assessment Score as indicated below.  Use Inhaler orders unless patient has one or more of the following: on home nebulizer, not able to hold breath for 10 seconds, is not alert and oriented, cannot activate and use MDI correctly, or respiratory rate 25 breaths per minute or more, then use the equivalent nebulizer order(s) with same Frequency and PRN reasons based on the score.  If a patient is on this medication at home then do not decrease Frequency below that used at home.    0-3 - enter or revise RT bronchodilator order(s) to equivalent RT Bronchodilator order with Frequency of every 4 hours PRN for wheezing or increased work of 
RT Inhaler-Nebulizer Bronchodilator Protocol Note    There is a bronchodilator order in the chart from a provider indicating to follow the RT Bronchodilator Protocol and there is an “Initiate RT Inhaler-Nebulizer Bronchodilator Protocol” order as well (see protocol at bottom of note).    CXR Findings:  XR CHEST PORTABLE    Result Date: 3/27/2024  1. Pulmonary edema with worsening basilar opacities and pleural effusions.       The findings from the last RT Protocol Assessment were as follows:   History Pulmonary Disease: Chronic pulmonary disease  Respiratory Pattern: Regular pattern and RR 12-20 bpm  Breath Sounds: Slightly diminished and/or crackles  Cough: Strong, spontaneous, non-productive  Indication for Bronchodilator Therapy: On home bronchodilators  Bronchodilator Assessment Score: 4    Aerosolized bronchodilator medication orders have been revised according to the RT Inhaler-Nebulizer Bronchodilator Protocol below.    Respiratory Therapist to perform RT Therapy Protocol Assessment initially then follow the protocol.  Repeat RT Therapy Protocol Assessment PRN for score 0-3 or on second treatment, BID, and PRN for scores above 3.    No Indications - adjust the frequency to every 6 hours PRN wheezing or bronchospasm, if no treatments needed after 48 hours then discontinue using Per Protocol order mode.     If indication present, adjust the RT bronchodilator orders based on the Bronchodilator Assessment Score as indicated below.  Use Inhaler orders unless patient has one or more of the following: on home nebulizer, not able to hold breath for 10 seconds, is not alert and oriented, cannot activate and use MDI correctly, or respiratory rate 25 breaths per minute or more, then use the equivalent nebulizer order(s) with same Frequency and PRN reasons based on the score.  If a patient is on this medication at home then do not decrease Frequency below that used at home.    0-3 - enter or revise RT bronchodilator 
order(s) to equivalent RT Bronchodilator order with Frequency of every 4 hours PRN for wheezing or increased work of breathing using Per Protocol order mode.        4-6 - enter or revise RT Bronchodilator order(s) to two equivalent RT bronchodilator orders with one order with BID Frequency and one order with Frequency of every 4 hours PRN wheezing or increased work of breathing using Per Protocol order mode.        7-10 - enter or revise RT Bronchodilator order(s) to two equivalent RT bronchodilator orders with one order with TID Frequency and one order with Frequency of every 4 hours PRN wheezing or increased work of breathing using Per Protocol order mode.       11-13 - enter or revise RT Bronchodilator order(s) to one equivalent RT bronchodilator order with QID Frequency and an Albuterol order with Frequency of every 4 hours PRN wheezing or increased work of breathing using Per Protocol order mode.      Greater than 13 - enter or revise RT Bronchodilator order(s) to one equivalent RT bronchodilator order with every 4 hours Frequency and an Albuterol order with Frequency of every 2 hours PRN wheezing or increased work of breathing using Per Protocol order mode.     RT to enter RT Home Evaluation for COPD & MDI Assessment order using Per Protocol order mode.    Electronically signed by Latonya Vallejo RCP on 3/28/2024 at 9:50 PM

## 2024-04-03 ENCOUNTER — APPOINTMENT (OUTPATIENT)
Dept: MRI IMAGING | Age: 54
DRG: 305 | End: 2024-04-03
Payer: MEDICAID

## 2024-04-03 ENCOUNTER — APPOINTMENT (OUTPATIENT)
Dept: VASCULAR LAB | Age: 54
DRG: 305 | End: 2024-04-03
Payer: MEDICAID

## 2024-04-03 LAB
ALBUMIN SERPL-MCNC: 3.2 G/DL (ref 3.4–5)
ANION GAP SERPL CALCULATED.3IONS-SCNC: 15 MMOL/L (ref 3–16)
BUN SERPL-MCNC: 64 MG/DL (ref 7–20)
CALCIUM SERPL-MCNC: 9 MG/DL (ref 8.3–10.6)
CHLORIDE SERPL-SCNC: 91 MMOL/L (ref 99–110)
CO2 SERPL-SCNC: 23 MMOL/L (ref 21–32)
CREAT SERPL-MCNC: 7.7 MG/DL (ref 0.9–1.3)
DEPRECATED RDW RBC AUTO: 16.7 % (ref 12.4–15.4)
GFR SERPLBLD CREATININE-BSD FMLA CKD-EPI: 8 ML/MIN/{1.73_M2}
GLUCOSE BLD-MCNC: 107 MG/DL (ref 70–99)
GLUCOSE BLD-MCNC: 186 MG/DL (ref 70–99)
GLUCOSE BLD-MCNC: 222 MG/DL (ref 70–99)
GLUCOSE BLD-MCNC: 87 MG/DL (ref 70–99)
GLUCOSE SERPL-MCNC: 95 MG/DL (ref 70–99)
HCT VFR BLD AUTO: 30.4 % (ref 40.5–52.5)
HGB BLD-MCNC: 9.5 G/DL (ref 13.5–17.5)
MCH RBC QN AUTO: 27.8 PG (ref 26–34)
MCHC RBC AUTO-ENTMCNC: 31.4 G/DL (ref 31–36)
MCV RBC AUTO: 88.5 FL (ref 80–100)
PERFORMED ON: ABNORMAL
PERFORMED ON: NORMAL
PHOSPHATE SERPL-MCNC: 5.4 MG/DL (ref 2.5–4.9)
PLATELET # BLD AUTO: 244 K/UL (ref 135–450)
PMV BLD AUTO: 7.6 FL (ref 5–10.5)
POTASSIUM SERPL-SCNC: 5.5 MMOL/L (ref 3.5–5.1)
RBC # BLD AUTO: 3.43 M/UL (ref 4.2–5.9)
SODIUM SERPL-SCNC: 129 MMOL/L (ref 136–145)
WBC # BLD AUTO: 7.2 K/UL (ref 4–11)

## 2024-04-03 PROCEDURE — 6370000000 HC RX 637 (ALT 250 FOR IP): Performed by: INTERNAL MEDICINE

## 2024-04-03 PROCEDURE — 6360000002 HC RX W HCPCS: Performed by: INTERNAL MEDICINE

## 2024-04-03 PROCEDURE — 2500000003 HC RX 250 WO HCPCS: Performed by: INTERNAL MEDICINE

## 2024-04-03 PROCEDURE — 2580000003 HC RX 258: Performed by: INTERNAL MEDICINE

## 2024-04-03 PROCEDURE — 2700000000 HC OXYGEN THERAPY PER DAY

## 2024-04-03 PROCEDURE — 6360000002 HC RX W HCPCS

## 2024-04-03 PROCEDURE — 36415 COLL VENOUS BLD VENIPUNCTURE: CPT

## 2024-04-03 PROCEDURE — 94761 N-INVAS EAR/PLS OXIMETRY MLT: CPT

## 2024-04-03 PROCEDURE — 80069 RENAL FUNCTION PANEL: CPT

## 2024-04-03 PROCEDURE — 85027 COMPLETE CBC AUTOMATED: CPT

## 2024-04-03 PROCEDURE — 2060000000 HC ICU INTERMEDIATE R&B

## 2024-04-03 PROCEDURE — 93923 UPR/LXTR ART STDY 3+ LVLS: CPT

## 2024-04-03 PROCEDURE — 73718 MRI LOWER EXTREMITY W/O DYE: CPT

## 2024-04-03 RX ORDER — ONDANSETRON 2 MG/ML
INJECTION INTRAMUSCULAR; INTRAVENOUS
Status: COMPLETED
Start: 2024-04-03 | End: 2024-04-03

## 2024-04-03 RX ADMIN — DULOXETINE HYDROCHLORIDE 60 MG: 60 CAPSULE, DELAYED RELEASE ORAL at 12:58

## 2024-04-03 RX ADMIN — SACUBITRIL AND VALSARTAN 1 TABLET: 24; 26 TABLET, FILM COATED ORAL at 21:17

## 2024-04-03 RX ADMIN — BUSPIRONE HYDROCHLORIDE 10 MG: 5 TABLET ORAL at 21:17

## 2024-04-03 RX ADMIN — BUSPIRONE HYDROCHLORIDE 10 MG: 5 TABLET ORAL at 12:58

## 2024-04-03 RX ADMIN — CALCIUM ACETATE 1334 MG: 667 CAPSULE ORAL at 17:11

## 2024-04-03 RX ADMIN — ONDANSETRON 4 MG: 2 INJECTION INTRAMUSCULAR; INTRAVENOUS at 12:23

## 2024-04-03 RX ADMIN — SODIUM CHLORIDE, PRESERVATIVE FREE 10 ML: 5 INJECTION INTRAVENOUS at 21:18

## 2024-04-03 RX ADMIN — SODIUM CHLORIDE, PRESERVATIVE FREE 10 ML: 5 INJECTION INTRAVENOUS at 13:06

## 2024-04-03 RX ADMIN — APIXABAN 5 MG: 5 TABLET, FILM COATED ORAL at 21:17

## 2024-04-03 RX ADMIN — APIXABAN 5 MG: 5 TABLET, FILM COATED ORAL at 13:05

## 2024-04-03 RX ADMIN — QUETIAPINE FUMARATE 50 MG: 25 TABLET ORAL at 21:17

## 2024-04-03 RX ADMIN — EPOETIN ALFA-EPBX 2000 UNITS: 2000 INJECTION, SOLUTION INTRAVENOUS; SUBCUTANEOUS at 13:08

## 2024-04-03 RX ADMIN — PRAVASTATIN SODIUM 40 MG: 40 TABLET ORAL at 12:58

## 2024-04-03 RX ADMIN — SACUBITRIL AND VALSARTAN 1 TABLET: 24; 26 TABLET, FILM COATED ORAL at 12:58

## 2024-04-03 RX ADMIN — FAMOTIDINE 10 MG: 20 TABLET, FILM COATED ORAL at 12:58

## 2024-04-03 RX ADMIN — TRAZODONE HYDROCHLORIDE 50 MG: 50 TABLET ORAL at 21:17

## 2024-04-03 RX ADMIN — CALCIUM ACETATE 1334 MG: 667 CAPSULE ORAL at 12:58

## 2024-04-03 RX ADMIN — EPOETIN ALFA-EPBX 3000 UNITS: 3000 INJECTION, SOLUTION INTRAVENOUS; SUBCUTANEOUS at 13:07

## 2024-04-03 RX ADMIN — METOPROLOL SUCCINATE 25 MG: 25 TABLET, FILM COATED, EXTENDED RELEASE ORAL at 12:58

## 2024-04-03 RX ADMIN — OXYCODONE AND ACETAMINOPHEN 1 TABLET: 5; 325 TABLET ORAL at 21:16

## 2024-04-03 RX ADMIN — INSULIN GLARGINE 15 UNITS: 100 INJECTION, SOLUTION SUBCUTANEOUS at 21:17

## 2024-04-03 RX ADMIN — SPIRONOLACTONE 12.5 MG: 25 TABLET ORAL at 13:08

## 2024-04-03 ASSESSMENT — PAIN DESCRIPTION - ORIENTATION
ORIENTATION: LOWER
ORIENTATION: LOWER

## 2024-04-03 ASSESSMENT — PAIN DESCRIPTION - LOCATION
LOCATION: BACK
LOCATION: BACK

## 2024-04-03 ASSESSMENT — PAIN SCALES - GENERAL
PAINLEVEL_OUTOF10: 4
PAINLEVEL_OUTOF10: 3
PAINLEVEL_OUTOF10: 8
PAINLEVEL_OUTOF10: 8

## 2024-04-03 ASSESSMENT — PAIN SCALES - WONG BAKER: WONGBAKER_NUMERICALRESPONSE: NO HURT

## 2024-04-03 ASSESSMENT — PAIN DESCRIPTION - DESCRIPTORS
DESCRIPTORS: ACHING
DESCRIPTORS: ACHING

## 2024-04-04 ENCOUNTER — ANESTHESIA (OUTPATIENT)
Dept: OPERATING ROOM | Age: 54
End: 2024-04-04
Payer: MEDICARE

## 2024-04-04 ENCOUNTER — APPOINTMENT (OUTPATIENT)
Dept: GENERAL RADIOLOGY | Age: 54
DRG: 305 | End: 2024-04-04
Payer: MEDICAID

## 2024-04-04 ENCOUNTER — ANESTHESIA EVENT (OUTPATIENT)
Dept: OPERATING ROOM | Age: 54
End: 2024-04-04
Payer: MEDICARE

## 2024-04-04 LAB
ALBUMIN SERPL-MCNC: 2.8 G/DL (ref 3.4–5)
ANION GAP SERPL CALCULATED.3IONS-SCNC: 13 MMOL/L (ref 3–16)
BUN SERPL-MCNC: 41 MG/DL (ref 7–20)
CALCIUM SERPL-MCNC: 8.6 MG/DL (ref 8.3–10.6)
CHLORIDE SERPL-SCNC: 87 MMOL/L (ref 99–110)
CO2 SERPL-SCNC: 24 MMOL/L (ref 21–32)
CREAT SERPL-MCNC: 4.9 MG/DL (ref 0.9–1.3)
DEPRECATED RDW RBC AUTO: 17.3 % (ref 12.4–15.4)
GFR SERPLBLD CREATININE-BSD FMLA CKD-EPI: 13 ML/MIN/{1.73_M2}
GLUCOSE BLD-MCNC: 111 MG/DL (ref 70–99)
GLUCOSE BLD-MCNC: 122 MG/DL (ref 70–99)
GLUCOSE BLD-MCNC: 126 MG/DL (ref 70–99)
GLUCOSE BLD-MCNC: 157 MG/DL (ref 70–99)
GLUCOSE SERPL-MCNC: 124 MG/DL (ref 70–99)
HCT VFR BLD AUTO: 30.2 % (ref 40.5–52.5)
HGB BLD-MCNC: 9.8 G/DL (ref 13.5–17.5)
MCH RBC QN AUTO: 28.3 PG (ref 26–34)
MCHC RBC AUTO-ENTMCNC: 32.4 G/DL (ref 31–36)
MCV RBC AUTO: 87.3 FL (ref 80–100)
PERFORMED ON: ABNORMAL
PHOSPHATE SERPL-MCNC: 4.7 MG/DL (ref 2.5–4.9)
PLATELET # BLD AUTO: 219 K/UL (ref 135–450)
PMV BLD AUTO: 7.4 FL (ref 5–10.5)
POTASSIUM SERPL-SCNC: 5.4 MMOL/L (ref 3.5–5.1)
RBC # BLD AUTO: 3.46 M/UL (ref 4.2–5.9)
SODIUM SERPL-SCNC: 124 MMOL/L (ref 136–145)
WBC # BLD AUTO: 6.8 K/UL (ref 4–11)

## 2024-04-04 PROCEDURE — 97110 THERAPEUTIC EXERCISES: CPT

## 2024-04-04 PROCEDURE — 86403 PARTICLE AGGLUT ANTBDY SCRN: CPT

## 2024-04-04 PROCEDURE — 6370000000 HC RX 637 (ALT 250 FOR IP): Performed by: INTERNAL MEDICINE

## 2024-04-04 PROCEDURE — 7100000001 HC PACU RECOVERY - ADDTL 15 MIN: Performed by: STUDENT IN AN ORGANIZED HEALTH CARE EDUCATION/TRAINING PROGRAM

## 2024-04-04 PROCEDURE — 3600000003 HC SURGERY LEVEL 3 BASE: Performed by: STUDENT IN AN ORGANIZED HEALTH CARE EDUCATION/TRAINING PROGRAM

## 2024-04-04 PROCEDURE — 2709999900 HC NON-CHARGEABLE SUPPLY: Performed by: STUDENT IN AN ORGANIZED HEALTH CARE EDUCATION/TRAINING PROGRAM

## 2024-04-04 PROCEDURE — 2700000000 HC OXYGEN THERAPY PER DAY

## 2024-04-04 PROCEDURE — 73630 X-RAY EXAM OF FOOT: CPT

## 2024-04-04 PROCEDURE — 85027 COMPLETE CBC AUTOMATED: CPT

## 2024-04-04 PROCEDURE — 97535 SELF CARE MNGMENT TRAINING: CPT

## 2024-04-04 PROCEDURE — 88311 DECALCIFY TISSUE: CPT

## 2024-04-04 PROCEDURE — 6370000000 HC RX 637 (ALT 250 FOR IP): Performed by: STUDENT IN AN ORGANIZED HEALTH CARE EDUCATION/TRAINING PROGRAM

## 2024-04-04 PROCEDURE — 80069 RENAL FUNCTION PANEL: CPT

## 2024-04-04 PROCEDURE — 0Y6M0ZF DETACHMENT AT RIGHT FOOT, PARTIAL 5TH RAY, OPEN APPROACH: ICD-10-PCS | Performed by: STUDENT IN AN ORGANIZED HEALTH CARE EDUCATION/TRAINING PROGRAM

## 2024-04-04 PROCEDURE — 2500000003 HC RX 250 WO HCPCS: Performed by: INTERNAL MEDICINE

## 2024-04-04 PROCEDURE — 97530 THERAPEUTIC ACTIVITIES: CPT

## 2024-04-04 PROCEDURE — 2580000003 HC RX 258: Performed by: STUDENT IN AN ORGANIZED HEALTH CARE EDUCATION/TRAINING PROGRAM

## 2024-04-04 PROCEDURE — 3700000000 HC ANESTHESIA ATTENDED CARE: Performed by: STUDENT IN AN ORGANIZED HEALTH CARE EDUCATION/TRAINING PROGRAM

## 2024-04-04 PROCEDURE — 97116 GAIT TRAINING THERAPY: CPT

## 2024-04-04 PROCEDURE — 2060000000 HC ICU INTERMEDIATE R&B

## 2024-04-04 PROCEDURE — 7100000000 HC PACU RECOVERY - FIRST 15 MIN: Performed by: STUDENT IN AN ORGANIZED HEALTH CARE EDUCATION/TRAINING PROGRAM

## 2024-04-04 PROCEDURE — 87205 SMEAR GRAM STAIN: CPT

## 2024-04-04 PROCEDURE — 2500000003 HC RX 250 WO HCPCS: Performed by: NURSE ANESTHETIST, CERTIFIED REGISTERED

## 2024-04-04 PROCEDURE — 87186 SC STD MICRODIL/AGAR DIL: CPT

## 2024-04-04 PROCEDURE — A4217 STERILE WATER/SALINE, 500 ML: HCPCS | Performed by: STUDENT IN AN ORGANIZED HEALTH CARE EDUCATION/TRAINING PROGRAM

## 2024-04-04 PROCEDURE — 0Y6M0ZD DETACHMENT AT RIGHT FOOT, PARTIAL 4TH RAY, OPEN APPROACH: ICD-10-PCS | Performed by: STUDENT IN AN ORGANIZED HEALTH CARE EDUCATION/TRAINING PROGRAM

## 2024-04-04 PROCEDURE — 0Y6M0ZC DETACHMENT AT RIGHT FOOT, PARTIAL 3RD RAY, OPEN APPROACH: ICD-10-PCS | Performed by: STUDENT IN AN ORGANIZED HEALTH CARE EDUCATION/TRAINING PROGRAM

## 2024-04-04 PROCEDURE — 87070 CULTURE OTHR SPECIMN AEROBIC: CPT

## 2024-04-04 PROCEDURE — 87077 CULTURE AEROBIC IDENTIFY: CPT

## 2024-04-04 PROCEDURE — 2580000003 HC RX 258: Performed by: INTERNAL MEDICINE

## 2024-04-04 PROCEDURE — 88304 TISSUE EXAM BY PATHOLOGIST: CPT

## 2024-04-04 PROCEDURE — 87075 CULTR BACTERIA EXCEPT BLOOD: CPT

## 2024-04-04 PROCEDURE — 0Y6M0ZB DETACHMENT AT RIGHT FOOT, PARTIAL 2ND RAY, OPEN APPROACH: ICD-10-PCS | Performed by: STUDENT IN AN ORGANIZED HEALTH CARE EDUCATION/TRAINING PROGRAM

## 2024-04-04 PROCEDURE — 3700000001 HC ADD 15 MINUTES (ANESTHESIA): Performed by: STUDENT IN AN ORGANIZED HEALTH CARE EDUCATION/TRAINING PROGRAM

## 2024-04-04 PROCEDURE — 6360000002 HC RX W HCPCS: Performed by: STUDENT IN AN ORGANIZED HEALTH CARE EDUCATION/TRAINING PROGRAM

## 2024-04-04 PROCEDURE — 2580000003 HC RX 258: Performed by: NURSE ANESTHETIST, CERTIFIED REGISTERED

## 2024-04-04 PROCEDURE — 3600000013 HC SURGERY LEVEL 3 ADDTL 15MIN: Performed by: STUDENT IN AN ORGANIZED HEALTH CARE EDUCATION/TRAINING PROGRAM

## 2024-04-04 PROCEDURE — 94761 N-INVAS EAR/PLS OXIMETRY MLT: CPT

## 2024-04-04 PROCEDURE — 88307 TISSUE EXAM BY PATHOLOGIST: CPT

## 2024-04-04 PROCEDURE — 6360000002 HC RX W HCPCS: Performed by: NURSE ANESTHETIST, CERTIFIED REGISTERED

## 2024-04-04 PROCEDURE — 0Y6M0Z9 DETACHMENT AT RIGHT FOOT, PARTIAL 1ST RAY, OPEN APPROACH: ICD-10-PCS | Performed by: STUDENT IN AN ORGANIZED HEALTH CARE EDUCATION/TRAINING PROGRAM

## 2024-04-04 RX ORDER — OXYCODONE HYDROCHLORIDE 5 MG/1
5 TABLET ORAL PRN
Status: DISCONTINUED | OUTPATIENT
Start: 2024-04-04 | End: 2024-04-04 | Stop reason: HOSPADM

## 2024-04-04 RX ORDER — SODIUM CHLORIDE 0.9 % (FLUSH) 0.9 %
5-40 SYRINGE (ML) INJECTION EVERY 12 HOURS SCHEDULED
Status: DISCONTINUED | OUTPATIENT
Start: 2024-04-04 | End: 2024-04-04 | Stop reason: HOSPADM

## 2024-04-04 RX ORDER — MIDAZOLAM HYDROCHLORIDE 1 MG/ML
INJECTION INTRAMUSCULAR; INTRAVENOUS PRN
Status: DISCONTINUED | OUTPATIENT
Start: 2024-04-04 | End: 2024-04-04 | Stop reason: SDUPTHER

## 2024-04-04 RX ORDER — BUPIVACAINE HYDROCHLORIDE 5 MG/ML
INJECTION, SOLUTION EPIDURAL; INTRACAUDAL PRN
Status: DISCONTINUED | OUTPATIENT
Start: 2024-04-04 | End: 2024-04-04 | Stop reason: ALTCHOICE

## 2024-04-04 RX ORDER — ONDANSETRON 2 MG/ML
INJECTION INTRAMUSCULAR; INTRAVENOUS PRN
Status: DISCONTINUED | OUTPATIENT
Start: 2024-04-04 | End: 2024-04-04 | Stop reason: SDUPTHER

## 2024-04-04 RX ORDER — DEXAMETHASONE SODIUM PHOSPHATE 10 MG/ML
INJECTION INTRAMUSCULAR; INTRAVENOUS PRN
Status: DISCONTINUED | OUTPATIENT
Start: 2024-04-04 | End: 2024-04-04 | Stop reason: SDUPTHER

## 2024-04-04 RX ORDER — PHENYLEPHRINE HCL IN 0.9% NACL 1 MG/10 ML
SYRINGE (ML) INTRAVENOUS PRN
Status: DISCONTINUED | OUTPATIENT
Start: 2024-04-04 | End: 2024-04-04 | Stop reason: SDUPTHER

## 2024-04-04 RX ORDER — SODIUM CHLORIDE 9 MG/ML
INJECTION, SOLUTION INTRAVENOUS PRN
Status: DISCONTINUED | OUTPATIENT
Start: 2024-04-04 | End: 2024-04-04 | Stop reason: HOSPADM

## 2024-04-04 RX ORDER — SODIUM CHLORIDE, SODIUM LACTATE, POTASSIUM CHLORIDE, CALCIUM CHLORIDE 600; 310; 30; 20 MG/100ML; MG/100ML; MG/100ML; MG/100ML
INJECTION, SOLUTION INTRAVENOUS CONTINUOUS PRN
Status: DISCONTINUED | OUTPATIENT
Start: 2024-04-04 | End: 2024-04-04 | Stop reason: SDUPTHER

## 2024-04-04 RX ORDER — ONDANSETRON 2 MG/ML
4 INJECTION INTRAMUSCULAR; INTRAVENOUS EVERY 30 MIN PRN
Status: DISCONTINUED | OUTPATIENT
Start: 2024-04-04 | End: 2024-04-04 | Stop reason: HOSPADM

## 2024-04-04 RX ORDER — IPRATROPIUM BROMIDE AND ALBUTEROL SULFATE 2.5; .5 MG/3ML; MG/3ML
1 SOLUTION RESPIRATORY (INHALATION) ONCE
Status: DISCONTINUED | OUTPATIENT
Start: 2024-04-04 | End: 2024-04-04

## 2024-04-04 RX ORDER — MAGNESIUM HYDROXIDE 1200 MG/15ML
LIQUID ORAL CONTINUOUS PRN
Status: COMPLETED | OUTPATIENT
Start: 2024-04-04 | End: 2024-04-04

## 2024-04-04 RX ORDER — OXYCODONE HYDROCHLORIDE 5 MG/1
10 TABLET ORAL PRN
Status: DISCONTINUED | OUTPATIENT
Start: 2024-04-04 | End: 2024-04-04 | Stop reason: HOSPADM

## 2024-04-04 RX ORDER — HYDROMORPHONE HYDROCHLORIDE 1 MG/ML
0.5 INJECTION, SOLUTION INTRAMUSCULAR; INTRAVENOUS; SUBCUTANEOUS EVERY 4 HOURS PRN
Status: DISCONTINUED | OUTPATIENT
Start: 2024-04-04 | End: 2024-04-04

## 2024-04-04 RX ORDER — LIDOCAINE HYDROCHLORIDE 20 MG/ML
INJECTION, SOLUTION INFILTRATION; PERINEURAL PRN
Status: DISCONTINUED | OUTPATIENT
Start: 2024-04-04 | End: 2024-04-04 | Stop reason: SDUPTHER

## 2024-04-04 RX ORDER — SODIUM CHLORIDE 0.9 % (FLUSH) 0.9 %
5-40 SYRINGE (ML) INJECTION PRN
Status: DISCONTINUED | OUTPATIENT
Start: 2024-04-04 | End: 2024-04-04 | Stop reason: HOSPADM

## 2024-04-04 RX ORDER — PROPOFOL 10 MG/ML
INJECTION, EMULSION INTRAVENOUS PRN
Status: DISCONTINUED | OUTPATIENT
Start: 2024-04-04 | End: 2024-04-04 | Stop reason: SDUPTHER

## 2024-04-04 RX ORDER — NALOXONE HYDROCHLORIDE 0.4 MG/ML
INJECTION, SOLUTION INTRAMUSCULAR; INTRAVENOUS; SUBCUTANEOUS PRN
Status: DISCONTINUED | OUTPATIENT
Start: 2024-04-04 | End: 2024-04-04 | Stop reason: HOSPADM

## 2024-04-04 RX ORDER — OXYCODONE HYDROCHLORIDE 5 MG/1
5 TABLET ORAL EVERY 4 HOURS PRN
Status: DISCONTINUED | OUTPATIENT
Start: 2024-04-04 | End: 2024-04-05

## 2024-04-04 RX ADMIN — CALCIUM ACETATE 1334 MG: 667 CAPSULE ORAL at 11:58

## 2024-04-04 RX ADMIN — DULOXETINE HYDROCHLORIDE 60 MG: 60 CAPSULE, DELAYED RELEASE ORAL at 08:51

## 2024-04-04 RX ADMIN — Medication 100 MCG: at 18:37

## 2024-04-04 RX ADMIN — SPIRONOLACTONE 12.5 MG: 25 TABLET ORAL at 08:49

## 2024-04-04 RX ADMIN — Medication 100 MCG: at 18:32

## 2024-04-04 RX ADMIN — TRAZODONE HYDROCHLORIDE 50 MG: 50 TABLET ORAL at 20:51

## 2024-04-04 RX ADMIN — BUSPIRONE HYDROCHLORIDE 10 MG: 5 TABLET ORAL at 13:28

## 2024-04-04 RX ADMIN — SODIUM CHLORIDE, PRESERVATIVE FREE 10 ML: 5 INJECTION INTRAVENOUS at 08:54

## 2024-04-04 RX ADMIN — SODIUM CHLORIDE, SODIUM LACTATE, POTASSIUM CHLORIDE, AND CALCIUM CHLORIDE: .6; .31; .03; .02 INJECTION, SOLUTION INTRAVENOUS at 17:47

## 2024-04-04 RX ADMIN — OXYCODONE AND ACETAMINOPHEN 1 TABLET: 5; 325 TABLET ORAL at 20:51

## 2024-04-04 RX ADMIN — BUSPIRONE HYDROCHLORIDE 10 MG: 5 TABLET ORAL at 08:49

## 2024-04-04 RX ADMIN — LIDOCAINE HYDROCHLORIDE 60 MG: 20 INJECTION, SOLUTION INFILTRATION; PERINEURAL at 17:55

## 2024-04-04 RX ADMIN — DEXAMETHASONE SODIUM PHOSPHATE 10 MG: 10 INJECTION INTRAMUSCULAR; INTRAVENOUS at 18:01

## 2024-04-04 RX ADMIN — INSULIN GLARGINE 15 UNITS: 100 INJECTION, SOLUTION SUBCUTANEOUS at 20:51

## 2024-04-04 RX ADMIN — BUSPIRONE HYDROCHLORIDE 10 MG: 5 TABLET ORAL at 20:51

## 2024-04-04 RX ADMIN — METOPROLOL SUCCINATE 25 MG: 25 TABLET, FILM COATED, EXTENDED RELEASE ORAL at 08:51

## 2024-04-04 RX ADMIN — ONDANSETRON 4 MG: 2 INJECTION INTRAMUSCULAR; INTRAVENOUS at 18:01

## 2024-04-04 RX ADMIN — MIDAZOLAM 2 MG: 1 INJECTION INTRAMUSCULAR; INTRAVENOUS at 17:47

## 2024-04-04 RX ADMIN — PROPOFOL 150 MG: 10 INJECTION, EMULSION INTRAVENOUS at 17:55

## 2024-04-04 RX ADMIN — PRAVASTATIN SODIUM 40 MG: 40 TABLET ORAL at 08:48

## 2024-04-04 RX ADMIN — OXYCODONE AND ACETAMINOPHEN 1 TABLET: 5; 325 TABLET ORAL at 08:51

## 2024-04-04 RX ADMIN — SODIUM ZIRCONIUM CYCLOSILICATE 10 G: 10 POWDER, FOR SUSPENSION ORAL at 13:29

## 2024-04-04 RX ADMIN — APIXABAN 5 MG: 5 TABLET, FILM COATED ORAL at 20:51

## 2024-04-04 RX ADMIN — SACUBITRIL AND VALSARTAN 1 TABLET: 24; 26 TABLET, FILM COATED ORAL at 20:51

## 2024-04-04 RX ADMIN — SACUBITRIL AND VALSARTAN 1 TABLET: 24; 26 TABLET, FILM COATED ORAL at 08:48

## 2024-04-04 RX ADMIN — QUETIAPINE FUMARATE 50 MG: 25 TABLET ORAL at 20:51

## 2024-04-04 RX ADMIN — CALCIUM ACETATE 1334 MG: 667 CAPSULE ORAL at 08:51

## 2024-04-04 ASSESSMENT — PAIN DESCRIPTION - DESCRIPTORS
DESCRIPTORS: ACHING;SHOOTING
DESCRIPTORS_2: ACHING
DESCRIPTORS_2: ACHING
DESCRIPTORS: ACHING;SHOOTING
DESCRIPTORS_2: ACHING
DESCRIPTORS_2: ACHING
DESCRIPTORS: ACHING;SHOOTING
DESCRIPTORS: ACHING
DESCRIPTORS_2: ACHING
DESCRIPTORS: ACHING;SHOOTING
DESCRIPTORS_2: ACHING
DESCRIPTORS: ACHING;TENDER

## 2024-04-04 ASSESSMENT — PAIN DESCRIPTION - PAIN TYPE
TYPE: CHRONIC PAIN

## 2024-04-04 ASSESSMENT — PAIN - FUNCTIONAL ASSESSMENT
PAIN_FUNCTIONAL_ASSESSMENT: PREVENTS OR INTERFERES WITH MANY ACTIVE NOT PASSIVE ACTIVITIES
PAIN_FUNCTIONAL_ASSESSMENT: PREVENTS OR INTERFERES SOME ACTIVE ACTIVITIES AND ADLS
PAIN_FUNCTIONAL_ASSESSMENT: NONE - DENIES PAIN

## 2024-04-04 ASSESSMENT — PAIN DESCRIPTION - ORIENTATION
ORIENTATION_2: RIGHT
ORIENTATION: LOWER
ORIENTATION_2: RIGHT
ORIENTATION: LOWER
ORIENTATION_2: RIGHT

## 2024-04-04 ASSESSMENT — PAIN DESCRIPTION - INTENSITY
RATING_2: 7
RATING_2: 8
RATING_2: 7
RATING_2: 8

## 2024-04-04 ASSESSMENT — PAIN DESCRIPTION - LOCATION
LOCATION: BACK
LOCATION: BACK
LOCATION_2: TOE (COMMENT WHICH ONE)
LOCATION: BACK
LOCATION_2: TOE (COMMENT WHICH ONE)
LOCATION_2: TOE (COMMENT WHICH ONE)
LOCATION: BACK
LOCATION_2: TOE (COMMENT WHICH ONE)
LOCATION: BACK;HIP

## 2024-04-04 ASSESSMENT — PAIN DESCRIPTION - ONSET
ONSET: ON-GOING

## 2024-04-04 ASSESSMENT — PAIN SCALES - GENERAL
PAINLEVEL_OUTOF10: 7
PAINLEVEL_OUTOF10: 7
PAINLEVEL_OUTOF10: 8
PAINLEVEL_OUTOF10: 8
PAINLEVEL_OUTOF10: 7
PAINLEVEL_OUTOF10: 8
PAINLEVEL_OUTOF10: 3
PAINLEVEL_OUTOF10: 8
PAINLEVEL_OUTOF10: 7
PAINLEVEL_OUTOF10: 8
PAINLEVEL_OUTOF10: 8

## 2024-04-04 ASSESSMENT — PAIN DESCRIPTION - DIRECTION
RADIATING_TOWARDS: HIP

## 2024-04-04 ASSESSMENT — PAIN DESCRIPTION - FREQUENCY
FREQUENCY: CONTINUOUS

## 2024-04-04 NOTE — BRIEF OP NOTE
Brief Postoperative Note      Patient: Igor Ann  YOB: 1970  MRN: [id number]    Date of Procedure: 4/4/2024    Pre-Op Diagnosis Codes:     * Osteomyelitis of ankle or foot, left, acute (HCC) [M86.172]    Post-Op Diagnosis: Same       Procedure(s):  RIGHT TRANSMETATARSAL AMPUTATION    Surgeon(s):  Corrie Berg MD    Assistant:  Surgical Assistant: Cate Viveros    Anesthesia: General    Estimated Blood Loss (mL): less than 100 mL    Complications: None    Specimens: R forefoot to pathology; R foot 4th MT clean margin for C&S/path   ID Type Source Tests Collected by Time Destination   1 : right 4th metatarsal Specimen Foot CULTURE, SURGICAL Corrie Berg MD 4/4/2024 1822    A : right forefoot Specimen Foot SURGICAL PATHOLOGY Corrie Berg MD 4/4/2024 1816    B : right 4th metatarsal, clean margin Specimen Foot SURGICAL PATHOLOGY Corrie Berg MD 4/4/2024 1821      Implants: None      Drains: None    Findings: 4th MT clean margin healthy/hard/viable during resection; okay vascular perfusion; mild nonviable skin, subcutaneous tissue, fat, and deep fascia about the 4th MPJ; no deep space abscess     Electronically signed by Corrie Berg MD on 4/4/2024 at 7:11 PM

## 2024-04-04 NOTE — ANESTHESIA PRE PROCEDURE
E87.70   • Sepsis ([facility]) A41.9   • Bimalleolar fracture of right ankle, closed, initial encounter S82.841A   • High anion gap metabolic acidosis E87.29   • Acute on chronic systolic CHF (congestive heart failure) ([facility]) I50.23   • Anxiety F41.9   • Acute on chronic respiratory failure ([facility]) J96.20   • Non-compliance Z91.199   • Diabetic ketoacidosis without coma associated with diabetes mellitus due to underlying condition ([facility]) E08.10   • Abnormal chest x-ray R93.89   • ZAINAB (obstructive sleep apnea) G47.33   • Acute systolic CHF (congestive heart failure) ([facility]) I50.21       Past Medical History:        Diagnosis Date   • Ambulatory dysfunction     walker for long distances, SOB with distance   • Aortic stenosis     echo 2017   • Arthritis     hands and hips   • Asthma    • 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 aJvier [facility]   • Cardiomyopathy ([facility]) 04/19/2019    EF= 43%   • CHF (congestive heart failure) ([facility])    • Chronic pain    • COPD (chronic obstructive pulmonary disease) ([facility])     pulmonology Dr. Batista   • CVA (cerebral vascular accident) ([facility]) 05/21/2017   • Depression    • Diabetes mellitus ([facility])     borderline   • Difficult intravenous access    • Emphysema of lung ([facility])    • ESRD (end stage renal disease) on dialysis ([facility])     MWF   • Fear of needles    • Gastric ulcer    • GERD (gastroesophageal reflux disease)    • Heart valve problem     bicuspic valve   • Hemodialysis patient ([facility])    • History of spinal fracture     work incident   • Hx of blood clots     Bilateral lower extremities; stents in place   • Hyperlipidemia    • Hypertension    • MI (myocardial infarction) ([facility]) 2019    has had 9 MIs. 2019 was the last   • Neuromuscular disorder ([facility])     due to CVA   • Numbness and tingling in left arm     from fistula   • Pneumonia    • PONV (postoperative nausea and vomiting)    • Prolonged emergence from general anesthesia     States requires

## 2024-04-04 NOTE — OP NOTE
Operative Note      Patient: Igor Ann  YOB: 1970  MRN: [id number]    Date of Procedure: 4/4/2024    Pre-Op Diagnosis: osteomyelitis, right foot; ulcer with necrosis of bone, right foot; PVD; DMII  Post-Op Diagnosis: Same       Procedure(s):  RIGHT TRANSMETATARSAL AMPUTATION (CPT 03016)    Surgeon: Corrie Berg DPM  Assistant: Surgical Assistant: Cate Viveros    Anesthesia: General    Injections: 20 mL 0.5% marcaine plain   Materials: 2-0 vicryl, 3-0 nylon   Tourniquet: None    Estimated Blood Loss (mL): less than 100 mL  Complications: None    Specimens: R forefoot to pathology; R foot 4th MT clean margin for C&S/path   ID Type Source Tests Collected by Time Destination   1 : right 4th metatarsal Specimen Foot CULTURE, SURGICAL Corrie Berg MD 4/4/2024 1822    A : right forefoot Specimen Foot SURGICAL PATHOLOGY Corrie Berg MD 4/4/2024 1816    B : right 4th metatarsal, clean margin Specimen Foot SURGICAL PATHOLOGY Corrie Berg MD 4/4/2024 1821        Findings: 4th MT clean margin healthy/hard/viable during resection; okay vascular perfusion; mild nonviable skin, subcutaneous tissue, fat, and deep fascia about the 4th MPJ; no deep space abscess     Indications for Procedure: Igor Ann, is a 53 year old male who has a necrotic ulceration to his plantar lateral 4th MPJ that probes to bone recalcitrant to conservative therapy. Pt with osteomyelitis confirmed via MRI to the 4th metatarsal head and distal shaft. It was determined patient would benefit from above surgical intervention for most functional limb going forward. All risks (including but not limited to infection, blood loss, blood clots such as DVT and PE, injuries to nerves, vessels, tendons, ligaments, bone and other structures, increased pain, stiffness, significant scarring, and recurrence with the need for additional surgery), complications, benefits, and alternatives were explained to the patient in detail. Patient

## 2024-04-05 LAB
ALBUMIN SERPL-MCNC: 3.6 G/DL (ref 3.4–5)
ANION GAP SERPL CALCULATED.3IONS-SCNC: 15 MMOL/L (ref 3–16)
BUN SERPL-MCNC: 60 MG/DL (ref 7–20)
CALCIUM SERPL-MCNC: 8.8 MG/DL (ref 8.3–10.6)
CHLORIDE SERPL-SCNC: 85 MMOL/L (ref 99–110)
CO2 SERPL-SCNC: 28 MMOL/L (ref 21–32)
CREAT SERPL-MCNC: 7.1 MG/DL (ref 0.9–1.3)
DEPRECATED RDW RBC AUTO: 17 % (ref 12.4–15.4)
GFR SERPLBLD CREATININE-BSD FMLA CKD-EPI: 8 ML/MIN/{1.73_M2}
GLUCOSE BLD-MCNC: 273 MG/DL (ref 70–99)
GLUCOSE BLD-MCNC: 279 MG/DL (ref 70–99)
GLUCOSE BLD-MCNC: 316 MG/DL (ref 70–99)
GLUCOSE SERPL-MCNC: 256 MG/DL (ref 70–99)
HCT VFR BLD AUTO: 30.9 % (ref 40.5–52.5)
HGB BLD-MCNC: 10.1 G/DL (ref 13.5–17.5)
MCH RBC QN AUTO: 28.4 PG (ref 26–34)
MCHC RBC AUTO-ENTMCNC: 32.6 G/DL (ref 31–36)
MCV RBC AUTO: 87.3 FL (ref 80–100)
NT-PROBNP SERPL-MCNC: ABNORMAL PG/ML (ref 0–124)
PERFORMED ON: ABNORMAL
PHOSPHATE SERPL-MCNC: 5 MG/DL (ref 2.5–4.9)
PLATELET # BLD AUTO: 261 K/UL (ref 135–450)
PMV BLD AUTO: 7.8 FL (ref 5–10.5)
POTASSIUM SERPL-SCNC: 5.3 MMOL/L (ref 3.5–5.1)
RBC # BLD AUTO: 3.54 M/UL (ref 4.2–5.9)
SODIUM SERPL-SCNC: 128 MMOL/L (ref 136–145)
WBC # BLD AUTO: 8.6 K/UL (ref 4–11)

## 2024-04-05 PROCEDURE — 2060000000 HC ICU INTERMEDIATE R&B

## 2024-04-05 PROCEDURE — 80069 RENAL FUNCTION PANEL: CPT

## 2024-04-05 PROCEDURE — 6360000002 HC RX W HCPCS: Performed by: STUDENT IN AN ORGANIZED HEALTH CARE EDUCATION/TRAINING PROGRAM

## 2024-04-05 PROCEDURE — 2500000003 HC RX 250 WO HCPCS: Performed by: STUDENT IN AN ORGANIZED HEALTH CARE EDUCATION/TRAINING PROGRAM

## 2024-04-05 PROCEDURE — 94761 N-INVAS EAR/PLS OXIMETRY MLT: CPT

## 2024-04-05 PROCEDURE — 83880 ASSAY OF NATRIURETIC PEPTIDE: CPT

## 2024-04-05 PROCEDURE — 36415 COLL VENOUS BLD VENIPUNCTURE: CPT

## 2024-04-05 PROCEDURE — 90935 HEMODIALYSIS ONE EVALUATION: CPT

## 2024-04-05 PROCEDURE — 6370000000 HC RX 637 (ALT 250 FOR IP): Performed by: STUDENT IN AN ORGANIZED HEALTH CARE EDUCATION/TRAINING PROGRAM

## 2024-04-05 PROCEDURE — 6360000002 HC RX W HCPCS: Performed by: NURSE PRACTITIONER

## 2024-04-05 PROCEDURE — 85027 COMPLETE CBC AUTOMATED: CPT

## 2024-04-05 PROCEDURE — 2580000003 HC RX 258: Performed by: STUDENT IN AN ORGANIZED HEALTH CARE EDUCATION/TRAINING PROGRAM

## 2024-04-05 PROCEDURE — 2700000000 HC OXYGEN THERAPY PER DAY

## 2024-04-05 RX ORDER — HEPARIN SODIUM 1000 [USP'U]/ML
INJECTION, SOLUTION INTRAVENOUS; SUBCUTANEOUS
Status: DISCONTINUED
Start: 2024-04-05 | End: 2024-04-06

## 2024-04-05 RX ORDER — HYDROMORPHONE HYDROCHLORIDE 1 MG/ML
0.5 INJECTION, SOLUTION INTRAMUSCULAR; INTRAVENOUS; SUBCUTANEOUS EVERY 4 HOURS PRN
Status: DISCONTINUED | OUTPATIENT
Start: 2024-04-05 | End: 2024-04-11 | Stop reason: HOSPADM

## 2024-04-05 RX ORDER — FENTANYL CITRATE 50 UG/ML
50 INJECTION, SOLUTION INTRAMUSCULAR; INTRAVENOUS ONCE
Status: COMPLETED | OUTPATIENT
Start: 2024-04-05 | End: 2024-04-05

## 2024-04-05 RX ORDER — INSULIN LISPRO 100 [IU]/ML
2 INJECTION, SOLUTION INTRAVENOUS; SUBCUTANEOUS
Status: DISCONTINUED | OUTPATIENT
Start: 2024-04-05 | End: 2024-04-11 | Stop reason: HOSPADM

## 2024-04-05 RX ORDER — OXYCODONE HYDROCHLORIDE AND ACETAMINOPHEN 5; 325 MG/1; MG/1
1 TABLET ORAL EVERY 4 HOURS PRN
Status: DISCONTINUED | OUTPATIENT
Start: 2024-04-05 | End: 2024-04-06

## 2024-04-05 RX ADMIN — PRAVASTATIN SODIUM 40 MG: 40 TABLET ORAL at 15:47

## 2024-04-05 RX ADMIN — OXYCODONE AND ACETAMINOPHEN 1 TABLET: 5; 325 TABLET ORAL at 21:20

## 2024-04-05 RX ADMIN — HYDROMORPHONE HYDROCHLORIDE 0.5 MG: 1 INJECTION, SOLUTION INTRAMUSCULAR; INTRAVENOUS; SUBCUTANEOUS at 23:30

## 2024-04-05 RX ADMIN — FENTANYL CITRATE 50 MCG: 50 INJECTION INTRAMUSCULAR; INTRAVENOUS at 20:12

## 2024-04-05 RX ADMIN — SODIUM CHLORIDE, PRESERVATIVE FREE 10 ML: 5 INJECTION INTRAVENOUS at 20:14

## 2024-04-05 RX ADMIN — INSULIN LISPRO 6 UNITS: 100 INJECTION, SOLUTION INTRAVENOUS; SUBCUTANEOUS at 08:26

## 2024-04-05 RX ADMIN — EPOETIN ALFA-EPBX 3000 UNITS: 3000 INJECTION, SOLUTION INTRAVENOUS; SUBCUTANEOUS at 12:18

## 2024-04-05 RX ADMIN — TRAZODONE HYDROCHLORIDE 50 MG: 50 TABLET ORAL at 21:20

## 2024-04-05 RX ADMIN — EPOETIN ALFA-EPBX 2000 UNITS: 2000 INJECTION, SOLUTION INTRAVENOUS; SUBCUTANEOUS at 12:16

## 2024-04-05 RX ADMIN — APIXABAN 5 MG: 5 TABLET, FILM COATED ORAL at 15:46

## 2024-04-05 RX ADMIN — BUSPIRONE HYDROCHLORIDE 10 MG: 5 TABLET ORAL at 21:19

## 2024-04-05 RX ADMIN — CALCIUM ACETATE 1334 MG: 667 CAPSULE ORAL at 15:46

## 2024-04-05 RX ADMIN — DULOXETINE HYDROCHLORIDE 60 MG: 60 CAPSULE, DELAYED RELEASE ORAL at 15:47

## 2024-04-05 RX ADMIN — SACUBITRIL AND VALSARTAN 1 TABLET: 24; 26 TABLET, FILM COATED ORAL at 23:30

## 2024-04-05 RX ADMIN — OXYCODONE 5 MG: 5 TABLET ORAL at 10:30

## 2024-04-05 RX ADMIN — HEPARIN SODIUM 3600 UNITS: 1000 INJECTION INTRAVENOUS; SUBCUTANEOUS at 12:15

## 2024-04-05 RX ADMIN — METOPROLOL SUCCINATE 25 MG: 25 TABLET, FILM COATED, EXTENDED RELEASE ORAL at 15:47

## 2024-04-05 RX ADMIN — SACUBITRIL AND VALSARTAN 1 TABLET: 24; 26 TABLET, FILM COATED ORAL at 15:47

## 2024-04-05 RX ADMIN — INSULIN GLARGINE 15 UNITS: 100 INJECTION, SOLUTION SUBCUTANEOUS at 20:33

## 2024-04-05 RX ADMIN — APIXABAN 5 MG: 5 TABLET, FILM COATED ORAL at 23:30

## 2024-04-05 RX ADMIN — QUETIAPINE FUMARATE 50 MG: 25 TABLET ORAL at 21:19

## 2024-04-05 RX ADMIN — OXYCODONE 5 MG: 5 TABLET ORAL at 04:08

## 2024-04-05 RX ADMIN — OXYCODONE AND ACETAMINOPHEN 1 TABLET: 5; 325 TABLET ORAL at 17:17

## 2024-04-05 RX ADMIN — HYDROMORPHONE HYDROCHLORIDE 0.5 MG: 1 INJECTION, SOLUTION INTRAMUSCULAR; INTRAVENOUS; SUBCUTANEOUS at 14:40

## 2024-04-05 RX ADMIN — BUSPIRONE HYDROCHLORIDE 10 MG: 5 TABLET ORAL at 15:47

## 2024-04-05 RX ADMIN — INSULIN LISPRO 2 UNITS: 100 INJECTION, SOLUTION INTRAVENOUS; SUBCUTANEOUS at 18:04

## 2024-04-05 RX ADMIN — HYDROMORPHONE HYDROCHLORIDE 0.5 MG: 1 INJECTION, SOLUTION INTRAMUSCULAR; INTRAVENOUS; SUBCUTANEOUS at 18:42

## 2024-04-05 RX ADMIN — INSULIN LISPRO 4 UNITS: 100 INJECTION, SOLUTION INTRAVENOUS; SUBCUTANEOUS at 18:04

## 2024-04-05 RX ADMIN — SPIRONOLACTONE 12.5 MG: 25 TABLET ORAL at 15:46

## 2024-04-05 ASSESSMENT — PAIN SCALES - WONG BAKER
WONGBAKER_NUMERICALRESPONSE: NO HURT
WONGBAKER_NUMERICALRESPONSE: NO HURT

## 2024-04-05 ASSESSMENT — PAIN DESCRIPTION - FREQUENCY
FREQUENCY: CONTINUOUS

## 2024-04-05 ASSESSMENT — PAIN SCALES - GENERAL
PAINLEVEL_OUTOF10: 10
PAINLEVEL_OUTOF10: 9
PAINLEVEL_OUTOF10: 10
PAINLEVEL_OUTOF10: 8
PAINLEVEL_OUTOF10: 10
PAINLEVEL_OUTOF10: 0
PAINLEVEL_OUTOF10: 0
PAINLEVEL_OUTOF10: 10

## 2024-04-05 ASSESSMENT — PAIN DESCRIPTION - LOCATION
LOCATION: FOOT
LOCATION: FOOT
LOCATION: BACK;FOOT
LOCATION: FOOT
LOCATION: FOOT
LOCATION: BACK;HIP;LEG
LOCATION: FOOT
LOCATION: FOOT

## 2024-04-05 ASSESSMENT — PAIN DESCRIPTION - ORIENTATION
ORIENTATION: RIGHT

## 2024-04-05 ASSESSMENT — PAIN DESCRIPTION - PAIN TYPE
TYPE: SURGICAL PAIN

## 2024-04-05 ASSESSMENT — PAIN DESCRIPTION - ONSET
ONSET: ON-GOING

## 2024-04-05 ASSESSMENT — PAIN - FUNCTIONAL ASSESSMENT
PAIN_FUNCTIONAL_ASSESSMENT: ACTIVITIES ARE NOT PREVENTED
PAIN_FUNCTIONAL_ASSESSMENT: PREVENTS OR INTERFERES SOME ACTIVE ACTIVITIES AND ADLS
PAIN_FUNCTIONAL_ASSESSMENT: PREVENTS OR INTERFERES WITH MANY ACTIVE NOT PASSIVE ACTIVITIES
PAIN_FUNCTIONAL_ASSESSMENT: PREVENTS OR INTERFERES SOME ACTIVE ACTIVITIES AND ADLS
PAIN_FUNCTIONAL_ASSESSMENT: PREVENTS OR INTERFERES SOME ACTIVE ACTIVITIES AND ADLS

## 2024-04-05 ASSESSMENT — PAIN DESCRIPTION - DESCRIPTORS
DESCRIPTORS: THROBBING
DESCRIPTORS: ACHING;THROBBING

## 2024-04-05 NOTE — ANESTHESIA POSTPROCEDURE EVALUATION
Department of Anesthesiology  Postprocedure Note    Patient: Igor Ann  MRN: [id number]  YOB: 1970  Date of evaluation: 4/4/2024    Procedure Summary       Date: 04/04/24 Room / Location: [address]    Anesthesia Start: 1747 Anesthesia Stop: 1912    Procedure: RIGHT TRANSMETATARSAL AMPUTATION (Right: Foot) Diagnosis:       Osteomyelitis of ankle or foot, left, acute (HCC)      (Osteomyelitis of ankle or foot, left, acute (HCC) [M86.172])    Surgeons: Corrie Berg MD Responsible Provider: Patrick Acuña MD    Anesthesia Type: general ASA Status: 4            Anesthesia Type: No value filed.    Ernesto Phase I: Ernesto Score: 9    Ernesto Phase II:      Anesthesia Post Evaluation    Patient location during evaluation: PACU  Patient participation: complete - patient participated  Level of consciousness: awake and alert  Airway patency: patent  Nausea & Vomiting: no nausea and no vomiting  Cardiovascular status: blood pressure returned to baseline  Respiratory status: acceptable  Hydration status: euvolemic  Comments: VSS on transfer to phase 2 recovery.  No anesthetic complications.  Pain management: adequate    No notable events documented.

## 2024-04-05 NOTE — FLOWSHEET NOTE
04/05/24 0932 04/05/24 1303   Treatment   Time On 0932  --    Time Off  --  1303   Treatment Goal 3000 3043   Observations & Evaluations   Level of Consciousness 0 0   Oriented X 3 3   Vital Signs   /79 135/78   Temp 97.8 °F (36.6 °C) 97.8 °F (36.6 °C)   Pulse 78 72   Respirations 18 16   Weight - Scale 97.5 kg (214 lb 15.2 oz) 94.5 kg (208 lb 5.4 oz)   Weight Method Bed scale Bed scale   Percent Weight Change 0.1 -3.08   Dry Weight 91.5 kg (201 lb 11.5 oz)  --    Pain Assessment   Pain Assessment 0-10 0-10   Pain Level 0 10   Treatment Initiation   Dialyze Hours 3.5  --    Treatment  Initiation Universal Precautions maintained;Lines secured to patient;Connections secured;Prime given;Venous Parameters set;Arterial Parameters set;Air foam detector engaged;[place] Device;Dialysate;Saline line double clamped;Hemo-Safe Applied;Dialyzer;F180  --    During Hemodialysis Assessment   Blood Flow Rate (ml/min) 400 ml/min  --    Arterial Pressure (mmHg) -210 mmHg  --    Venous Pressure (mmHg) 150  --    TMP 30  --      --    Access Visible Yes  --    Ultrafiltration Rate (ml/hr) 1000 ml/hr  --    Hemodialysis Central Access Left Subclavian   No placement date or time found.   Present on Admission/Arrival: Yes  Orientation: Left  Access Location: Subclavian   Continued need for line? Yes Yes   Site Assessment Clean, dry & intact Clean, dry & intact   Venous Lumen Status Brisk blood return;Flushed Flushed;Heparin locked   Arterial Lumen Status Brisk blood return;Flushed Flushed;Heparin locked   Line Care Connections checked and tightened  --    Date of Last Dressing Change 04/05/24 04/05/24   Dressing Status Clean, dry & intact  --    Post-Hemodialysis Assessment   Post-Treatment Procedures  --  Blood returned;Catheter capped, clamped and heparinized x 2 ports   Machine Disinfection Process  --  Acid/Vinegar Clean;Heat Disinfect;Exterior Machine Disinfection   Rinseback Volume (ml)  --  500 ml   Blood Volume 
Pt AA&O x 4. Hypotensive, vitals otherwise WNL. Morning dose of Metoprolol held. Pt reports chronic leg & back pain. PRN medication given.      03/30/24 0830   Assessment   Charting Type Shift assessment   Psychosocial   Psychosocial (WDL) WDL   Neurological   Neuro (WDL) WDL   Level of Consciousness 0   Robert Coma Scale   Eye Opening 4   Best Verbal Response 5   Best Motor Response 6   Robert Coma Scale Score 15   NIHSS Stroke Scale   NIHSS Stroke Scale Assessed No   HEENT (Head, Ears, Eyes, Nose, & Throat)   HEENT (WDL) X   Right Eye Impaired vision   Left Eye Impaired vision   Teeth Missing teeth   Respiratory   Respiratory (WDL) X   Respiratory Pattern Regular   Respiratory Depth Normal   Respiratory Quality/Effort Unlabored;Dyspnea with exertion   Chest Assessment Chest expansion symmetrical   L Breath Sounds Clear;Diminished   R Breath Sounds Clear;Diminished   Subcutaneous Air/Crepitus None   Breath Sounds   Breath Sounds Bilateral Clear   Right Upper Lobe Clear   Right Middle Lobe Diminished   Right Lower Lobe Diminished   Left Upper Lobe Clear   Left Lower Lobe Diminished   Cardiac   Cardiac (WDL) WDL   Cardiac Regularity Regular   Heart Sounds S1, S2   Cardiac Rhythm Sinus rhythm   Rhythm Interpretation   Pulse 83   Cardiac Monitor   Telemetry Box Number 84   Cardiac/Telemetry Monitor On Portable telemetry pack applied   Gastrointestinal   Abdominal (WDL) WDL   Abdomen Inspection Distended;Soft;Rounded   Last BM (including prior to admit) 03/29/24   Genitourinary   Genitourinary (WDL)   (Pt is on HD tx. Oliguric per RN report)   Suprapubic Tenderness No   Dysuria (Pain/Burning w/Urination) No   Urine Assessment   Urinary Status Oliguria;Bathroom privileges   Urinary Incontinence Absent   Urine Color RONY   Urine Appearance RONY   Urine Odor RONY   Peripheral Vascular   Peripheral Vascular (WDL) X   Edema Generalized   Edema Generalized Non-pitting   RLE Neurovascular Assessment   Capillary Refill Less 
Pt left unit while stable for surgery. Will monitor upon return.  
Pt still off floor for surgery. Report given to Joselyn García RN.  
Processed (Liters)  --  71.2 L   Dialyzer Clearance  --  Lightly streaked   Duration of Treatment (minutes)  --  210 minutes   Hemodialysis Intake (ml)  --  500 ml   Hemodialysis Output (ml)  --  3543 ml   NET Removed (ml)  --  3043   Tolerated Treatment  --  Good   Dialysis Bath   K+ (Potassium) 2  --    Ca+ (Calcium) 2.5  --    Na+ (Sodium) 132  --    HCO3 (Bicarb) 34  --

## 2024-04-06 LAB
ANION GAP SERPL CALCULATED.3IONS-SCNC: 13 MMOL/L (ref 3–16)
ANISOCYTOSIS BLD QL SMEAR: ABNORMAL
BASOPHILS # BLD: 0 K/UL (ref 0–0.2)
BASOPHILS NFR BLD: 0 %
BUN SERPL-MCNC: 40 MG/DL (ref 7–20)
CALCIUM SERPL-MCNC: 8.9 MG/DL (ref 8.3–10.6)
CHLORIDE SERPL-SCNC: 89 MMOL/L (ref 99–110)
CO2 SERPL-SCNC: 24 MMOL/L (ref 21–32)
CREAT SERPL-MCNC: 4.7 MG/DL (ref 0.9–1.3)
DACRYOCYTES BLD QL SMEAR: ABNORMAL
DEPRECATED RDW RBC AUTO: 16.8 % (ref 12.4–15.4)
EOSINOPHIL # BLD: 0.5 K/UL (ref 0–0.6)
EOSINOPHIL NFR BLD: 5 %
GFR SERPLBLD CREATININE-BSD FMLA CKD-EPI: 14 ML/MIN/{1.73_M2}
GLUCOSE BLD-MCNC: 148 MG/DL (ref 70–99)
GLUCOSE BLD-MCNC: 155 MG/DL (ref 70–99)
GLUCOSE BLD-MCNC: 183 MG/DL (ref 70–99)
GLUCOSE BLD-MCNC: 210 MG/DL (ref 70–99)
GLUCOSE SERPL-MCNC: 184 MG/DL (ref 70–99)
HCT VFR BLD AUTO: 31.4 % (ref 40.5–52.5)
HGB BLD-MCNC: 9.9 G/DL (ref 13.5–17.5)
LYMPHOCYTES # BLD: 1.3 K/UL (ref 1–5.1)
LYMPHOCYTES NFR BLD: 13 %
MAGNESIUM SERPL-MCNC: 2 MG/DL (ref 1.8–2.4)
MCH RBC QN AUTO: 28.4 PG (ref 26–34)
MCHC RBC AUTO-ENTMCNC: 31.4 G/DL (ref 31–36)
MCV RBC AUTO: 90.2 FL (ref 80–100)
MONOCYTES # BLD: 0.4 K/UL (ref 0–1.3)
MONOCYTES NFR BLD: 4 %
NEUTROPHILS # BLD: 8 K/UL (ref 1.7–7.7)
NEUTROPHILS NFR BLD: 70 %
NEUTS BAND NFR BLD MANUAL: 8 % (ref 0–7)
OVALOCYTES BLD QL SMEAR: ABNORMAL
PERFORMED ON: ABNORMAL
PLATELET # BLD AUTO: 218 K/UL (ref 135–450)
PLATELET BLD QL SMEAR: ADEQUATE
PMV BLD AUTO: 7.6 FL (ref 5–10.5)
POIKILOCYTOSIS BLD QL SMEAR: ABNORMAL
POLYCHROMASIA BLD QL SMEAR: ABNORMAL
POTASSIUM SERPL-SCNC: 5.5 MMOL/L (ref 3.5–5.1)
RBC # BLD AUTO: 3.48 M/UL (ref 4.2–5.9)
SLIDE REVIEW: ABNORMAL
SODIUM SERPL-SCNC: 126 MMOL/L (ref 136–145)
TARGETS BLD QL SMEAR: ABNORMAL
WBC # BLD AUTO: 10.2 K/UL (ref 4–11)

## 2024-04-06 PROCEDURE — 6370000000 HC RX 637 (ALT 250 FOR IP): Performed by: STUDENT IN AN ORGANIZED HEALTH CARE EDUCATION/TRAINING PROGRAM

## 2024-04-06 PROCEDURE — 87205 SMEAR GRAM STAIN: CPT

## 2024-04-06 PROCEDURE — 83735 ASSAY OF MAGNESIUM: CPT

## 2024-04-06 PROCEDURE — 2500000003 HC RX 250 WO HCPCS: Performed by: STUDENT IN AN ORGANIZED HEALTH CARE EDUCATION/TRAINING PROGRAM

## 2024-04-06 PROCEDURE — 87070 CULTURE OTHR SPECIMN AEROBIC: CPT

## 2024-04-06 PROCEDURE — 80048 BASIC METABOLIC PNL TOTAL CA: CPT

## 2024-04-06 PROCEDURE — 2060000000 HC ICU INTERMEDIATE R&B

## 2024-04-06 PROCEDURE — 90935 HEMODIALYSIS ONE EVALUATION: CPT

## 2024-04-06 PROCEDURE — 6360000002 HC RX W HCPCS

## 2024-04-06 PROCEDURE — 36415 COLL VENOUS BLD VENIPUNCTURE: CPT

## 2024-04-06 PROCEDURE — 6360000002 HC RX W HCPCS: Performed by: STUDENT IN AN ORGANIZED HEALTH CARE EDUCATION/TRAINING PROGRAM

## 2024-04-06 PROCEDURE — 2700000000 HC OXYGEN THERAPY PER DAY

## 2024-04-06 PROCEDURE — 94761 N-INVAS EAR/PLS OXIMETRY MLT: CPT

## 2024-04-06 PROCEDURE — 2580000003 HC RX 258: Performed by: STUDENT IN AN ORGANIZED HEALTH CARE EDUCATION/TRAINING PROGRAM

## 2024-04-06 PROCEDURE — 85025 COMPLETE CBC W/AUTO DIFF WBC: CPT

## 2024-04-06 RX ORDER — HEPARIN SODIUM 1000 [USP'U]/ML
INJECTION, SOLUTION INTRAVENOUS; SUBCUTANEOUS
Status: COMPLETED
Start: 2024-04-06 | End: 2024-04-06

## 2024-04-06 RX ORDER — OXYCODONE AND ACETAMINOPHEN 7.5; 325 MG/1; MG/1
1 TABLET ORAL EVERY 4 HOURS PRN
Status: DISCONTINUED | OUTPATIENT
Start: 2024-04-06 | End: 2024-04-11 | Stop reason: HOSPADM

## 2024-04-06 RX ADMIN — HEPARIN SODIUM 3600 UNITS: 1000 INJECTION INTRAVENOUS; SUBCUTANEOUS at 13:18

## 2024-04-06 RX ADMIN — SACUBITRIL AND VALSARTAN 1 TABLET: 24; 26 TABLET, FILM COATED ORAL at 08:28

## 2024-04-06 RX ADMIN — DULOXETINE HYDROCHLORIDE 60 MG: 60 CAPSULE, DELAYED RELEASE ORAL at 08:28

## 2024-04-06 RX ADMIN — OXYCODONE AND ACETAMINOPHEN 1 TABLET: 7.5; 325 TABLET ORAL at 18:01

## 2024-04-06 RX ADMIN — INSULIN LISPRO 2 UNITS: 100 INJECTION, SOLUTION INTRAVENOUS; SUBCUTANEOUS at 08:28

## 2024-04-06 RX ADMIN — QUETIAPINE FUMARATE 50 MG: 25 TABLET ORAL at 20:28

## 2024-04-06 RX ADMIN — OXYCODONE AND ACETAMINOPHEN 1 TABLET: 7.5; 325 TABLET ORAL at 13:55

## 2024-04-06 RX ADMIN — METOPROLOL SUCCINATE 25 MG: 25 TABLET, FILM COATED, EXTENDED RELEASE ORAL at 08:28

## 2024-04-06 RX ADMIN — SODIUM CHLORIDE, PRESERVATIVE FREE 10 ML: 5 INJECTION INTRAVENOUS at 20:30

## 2024-04-06 RX ADMIN — SPIRONOLACTONE 12.5 MG: 25 TABLET ORAL at 08:28

## 2024-04-06 RX ADMIN — APIXABAN 5 MG: 5 TABLET, FILM COATED ORAL at 08:28

## 2024-04-06 RX ADMIN — INSULIN LISPRO 2 UNITS: 100 INJECTION, SOLUTION INTRAVENOUS; SUBCUTANEOUS at 16:55

## 2024-04-06 RX ADMIN — OXYCODONE AND ACETAMINOPHEN 1 TABLET: 5; 325 TABLET ORAL at 08:28

## 2024-04-06 RX ADMIN — BUSPIRONE HYDROCHLORIDE 10 MG: 5 TABLET ORAL at 20:28

## 2024-04-06 RX ADMIN — OXYCODONE AND ACETAMINOPHEN 1 TABLET: 7.5; 325 TABLET ORAL at 22:14

## 2024-04-06 RX ADMIN — HEPARIN SODIUM 3600 UNITS: 1000 INJECTION, SOLUTION INTRAVENOUS; SUBCUTANEOUS at 13:18

## 2024-04-06 RX ADMIN — CALCIUM ACETATE 1334 MG: 667 CAPSULE ORAL at 08:28

## 2024-04-06 RX ADMIN — HYDROMORPHONE HYDROCHLORIDE 0.5 MG: 1 INJECTION, SOLUTION INTRAMUSCULAR; INTRAVENOUS; SUBCUTANEOUS at 20:25

## 2024-04-06 RX ADMIN — TRAZODONE HYDROCHLORIDE 50 MG: 50 TABLET ORAL at 20:28

## 2024-04-06 RX ADMIN — BUSPIRONE HYDROCHLORIDE 10 MG: 5 TABLET ORAL at 13:57

## 2024-04-06 RX ADMIN — APIXABAN 5 MG: 5 TABLET, FILM COATED ORAL at 20:28

## 2024-04-06 RX ADMIN — BUSPIRONE HYDROCHLORIDE 10 MG: 5 TABLET ORAL at 08:28

## 2024-04-06 RX ADMIN — HYDROMORPHONE HYDROCHLORIDE 0.5 MG: 1 INJECTION, SOLUTION INTRAMUSCULAR; INTRAVENOUS; SUBCUTANEOUS at 11:08

## 2024-04-06 RX ADMIN — CALCIUM ACETATE 1334 MG: 667 CAPSULE ORAL at 16:59

## 2024-04-06 RX ADMIN — INSULIN GLARGINE 15 UNITS: 100 INJECTION, SOLUTION SUBCUTANEOUS at 20:29

## 2024-04-06 RX ADMIN — PRAVASTATIN SODIUM 40 MG: 40 TABLET ORAL at 08:28

## 2024-04-06 RX ADMIN — HYDROMORPHONE HYDROCHLORIDE 0.5 MG: 1 INJECTION, SOLUTION INTRAMUSCULAR; INTRAVENOUS; SUBCUTANEOUS at 15:09

## 2024-04-06 RX ADMIN — SACUBITRIL AND VALSARTAN 1 TABLET: 24; 26 TABLET, FILM COATED ORAL at 20:28

## 2024-04-06 ASSESSMENT — PAIN DESCRIPTION - LOCATION
LOCATION: LEG
LOCATION: LEG;FOOT
LOCATION: FOOT;LEG
LOCATION: FOOT;LEG
LOCATION: LEG
LOCATION: LEG;FOOT

## 2024-04-06 ASSESSMENT — PAIN SCALES - GENERAL
PAINLEVEL_OUTOF10: 10
PAINLEVEL_OUTOF10: 8
PAINLEVEL_OUTOF10: 10
PAINLEVEL_OUTOF10: 0
PAINLEVEL_OUTOF10: 8
PAINLEVEL_OUTOF10: 10
PAINLEVEL_OUTOF10: 10

## 2024-04-06 ASSESSMENT — PAIN DESCRIPTION - ORIENTATION
ORIENTATION: RIGHT

## 2024-04-06 ASSESSMENT — PAIN DESCRIPTION - DESCRIPTORS
DESCRIPTORS: ACHING;THROBBING;DISCOMFORT
DESCRIPTORS: ACHING;DISCOMFORT;THROBBING
DESCRIPTORS: ACHING;DISCOMFORT;THROBBING
DESCRIPTORS: DISCOMFORT;THROBBING

## 2024-04-06 ASSESSMENT — PAIN - FUNCTIONAL ASSESSMENT
PAIN_FUNCTIONAL_ASSESSMENT: PREVENTS OR INTERFERES SOME ACTIVE ACTIVITIES AND ADLS
PAIN_FUNCTIONAL_ASSESSMENT: PREVENTS OR INTERFERES WITH MANY ACTIVE NOT PASSIVE ACTIVITIES

## 2024-04-06 ASSESSMENT — PAIN DESCRIPTION - PAIN TYPE
TYPE: SURGICAL PAIN
TYPE: SURGICAL PAIN

## 2024-04-07 LAB
ANION GAP SERPL CALCULATED.3IONS-SCNC: 14 MMOL/L (ref 3–16)
ANISOCYTOSIS BLD QL SMEAR: ABNORMAL
BASOPHILS # BLD: 0.2 K/UL (ref 0–0.2)
BASOPHILS NFR BLD: 2 %
BUN SERPL-MCNC: 39 MG/DL (ref 7–20)
CALCIUM SERPL-MCNC: 9.5 MG/DL (ref 8.3–10.6)
CHLORIDE SERPL-SCNC: 93 MMOL/L (ref 99–110)
CO2 SERPL-SCNC: 21 MMOL/L (ref 21–32)
CREAT SERPL-MCNC: 4.8 MG/DL (ref 0.9–1.3)
DEPRECATED RDW RBC AUTO: 18.1 % (ref 12.4–15.4)
EOSINOPHIL # BLD: 0.7 K/UL (ref 0–0.6)
EOSINOPHIL NFR BLD: 8 %
GFR SERPLBLD CREATININE-BSD FMLA CKD-EPI: 13 ML/MIN/{1.73_M2}
GLUCOSE BLD-MCNC: 119 MG/DL (ref 70–99)
GLUCOSE BLD-MCNC: 122 MG/DL (ref 70–99)
GLUCOSE BLD-MCNC: 124 MG/DL (ref 70–99)
GLUCOSE BLD-MCNC: 133 MG/DL (ref 70–99)
GLUCOSE SERPL-MCNC: 144 MG/DL (ref 70–99)
HCT VFR BLD AUTO: 36.1 % (ref 40.5–52.5)
HGB BLD-MCNC: 10.8 G/DL (ref 13.5–17.5)
LYMPHOCYTES # BLD: 1.4 K/UL (ref 1–5.1)
LYMPHOCYTES NFR BLD: 15 %
MAGNESIUM SERPL-MCNC: 2 MG/DL (ref 1.8–2.4)
MCH RBC QN AUTO: 28.3 PG (ref 26–34)
MCHC RBC AUTO-ENTMCNC: 29.8 G/DL (ref 31–36)
MCV RBC AUTO: 94.9 FL (ref 80–100)
METAMYELOCYTES NFR BLD MANUAL: 1 %
MONOCYTES # BLD: 0.6 K/UL (ref 0–1.3)
MONOCYTES NFR BLD: 7 %
MYELOCYTES NFR BLD MANUAL: 1 %
NEUTROPHILS # BLD: 6.2 K/UL (ref 1.7–7.7)
NEUTROPHILS NFR BLD: 61 %
NEUTS BAND NFR BLD MANUAL: 5 % (ref 0–7)
PERFORMED ON: ABNORMAL
PLATELET # BLD AUTO: 209 K/UL (ref 135–450)
PLATELET BLD QL SMEAR: ADEQUATE
PMV BLD AUTO: 8.1 FL (ref 5–10.5)
POIKILOCYTOSIS BLD QL SMEAR: ABNORMAL
POTASSIUM SERPL-SCNC: 5.5 MMOL/L (ref 3.5–5.1)
RBC # BLD AUTO: 3.8 M/UL (ref 4.2–5.9)
SLIDE REVIEW: ABNORMAL
SODIUM SERPL-SCNC: 128 MMOL/L (ref 136–145)
WBC # BLD AUTO: 9.1 K/UL (ref 4–11)

## 2024-04-07 PROCEDURE — 97164 PT RE-EVAL EST PLAN CARE: CPT

## 2024-04-07 PROCEDURE — 6360000002 HC RX W HCPCS: Performed by: STUDENT IN AN ORGANIZED HEALTH CARE EDUCATION/TRAINING PROGRAM

## 2024-04-07 PROCEDURE — 6370000000 HC RX 637 (ALT 250 FOR IP): Performed by: STUDENT IN AN ORGANIZED HEALTH CARE EDUCATION/TRAINING PROGRAM

## 2024-04-07 PROCEDURE — 94761 N-INVAS EAR/PLS OXIMETRY MLT: CPT

## 2024-04-07 PROCEDURE — 2580000003 HC RX 258: Performed by: STUDENT IN AN ORGANIZED HEALTH CARE EDUCATION/TRAINING PROGRAM

## 2024-04-07 PROCEDURE — 97168 OT RE-EVAL EST PLAN CARE: CPT

## 2024-04-07 PROCEDURE — 1200000000 HC SEMI PRIVATE

## 2024-04-07 PROCEDURE — 97530 THERAPEUTIC ACTIVITIES: CPT

## 2024-04-07 PROCEDURE — 80048 BASIC METABOLIC PNL TOTAL CA: CPT

## 2024-04-07 PROCEDURE — 2700000000 HC OXYGEN THERAPY PER DAY

## 2024-04-07 PROCEDURE — 6370000000 HC RX 637 (ALT 250 FOR IP): Performed by: INTERNAL MEDICINE

## 2024-04-07 PROCEDURE — 2500000003 HC RX 250 WO HCPCS: Performed by: STUDENT IN AN ORGANIZED HEALTH CARE EDUCATION/TRAINING PROGRAM

## 2024-04-07 PROCEDURE — 83735 ASSAY OF MAGNESIUM: CPT

## 2024-04-07 PROCEDURE — 85025 COMPLETE CBC W/AUTO DIFF WBC: CPT

## 2024-04-07 RX ADMIN — HYDROMORPHONE HYDROCHLORIDE 0.5 MG: 1 INJECTION, SOLUTION INTRAMUSCULAR; INTRAVENOUS; SUBCUTANEOUS at 23:48

## 2024-04-07 RX ADMIN — BUSPIRONE HYDROCHLORIDE 10 MG: 5 TABLET ORAL at 21:15

## 2024-04-07 RX ADMIN — DULOXETINE HYDROCHLORIDE 60 MG: 60 CAPSULE, DELAYED RELEASE ORAL at 08:14

## 2024-04-07 RX ADMIN — HYDROMORPHONE HYDROCHLORIDE 0.5 MG: 1 INJECTION, SOLUTION INTRAMUSCULAR; INTRAVENOUS; SUBCUTANEOUS at 04:39

## 2024-04-07 RX ADMIN — INSULIN LISPRO 2 UNITS: 100 INJECTION, SOLUTION INTRAVENOUS; SUBCUTANEOUS at 11:59

## 2024-04-07 RX ADMIN — APIXABAN 5 MG: 5 TABLET, FILM COATED ORAL at 21:15

## 2024-04-07 RX ADMIN — CALCIUM ACETATE 1334 MG: 667 CAPSULE ORAL at 08:14

## 2024-04-07 RX ADMIN — BUSPIRONE HYDROCHLORIDE 10 MG: 5 TABLET ORAL at 08:14

## 2024-04-07 RX ADMIN — HYDROMORPHONE HYDROCHLORIDE 0.5 MG: 1 INJECTION, SOLUTION INTRAMUSCULAR; INTRAVENOUS; SUBCUTANEOUS at 18:12

## 2024-04-07 RX ADMIN — OXYCODONE AND ACETAMINOPHEN 1 TABLET: 7.5; 325 TABLET ORAL at 21:15

## 2024-04-07 RX ADMIN — OXYCODONE AND ACETAMINOPHEN 1 TABLET: 7.5; 325 TABLET ORAL at 16:36

## 2024-04-07 RX ADMIN — SODIUM CHLORIDE, PRESERVATIVE FREE 10 ML: 5 INJECTION INTRAVENOUS at 21:17

## 2024-04-07 RX ADMIN — TRAZODONE HYDROCHLORIDE 50 MG: 50 TABLET ORAL at 21:15

## 2024-04-07 RX ADMIN — INSULIN LISPRO 2 UNITS: 100 INJECTION, SOLUTION INTRAVENOUS; SUBCUTANEOUS at 08:14

## 2024-04-07 RX ADMIN — OXYCODONE AND ACETAMINOPHEN 1 TABLET: 7.5; 325 TABLET ORAL at 06:07

## 2024-04-07 RX ADMIN — OXYCODONE AND ACETAMINOPHEN 1 TABLET: 7.5; 325 TABLET ORAL at 11:19

## 2024-04-07 RX ADMIN — PRAVASTATIN SODIUM 40 MG: 40 TABLET ORAL at 08:14

## 2024-04-07 RX ADMIN — VANCOMYCIN HYDROCHLORIDE 2250 MG: 500 INJECTION, POWDER, LYOPHILIZED, FOR SOLUTION INTRAVENOUS at 10:15

## 2024-04-07 RX ADMIN — BUSPIRONE HYDROCHLORIDE 10 MG: 5 TABLET ORAL at 13:31

## 2024-04-07 RX ADMIN — SACUBITRIL AND VALSARTAN 1 TABLET: 24; 26 TABLET, FILM COATED ORAL at 21:15

## 2024-04-07 RX ADMIN — INSULIN GLARGINE 15 UNITS: 100 INJECTION, SOLUTION SUBCUTANEOUS at 21:16

## 2024-04-07 RX ADMIN — CALCIUM ACETATE 1334 MG: 667 CAPSULE ORAL at 11:59

## 2024-04-07 RX ADMIN — HYDROMORPHONE HYDROCHLORIDE 0.5 MG: 1 INJECTION, SOLUTION INTRAMUSCULAR; INTRAVENOUS; SUBCUTANEOUS at 13:00

## 2024-04-07 RX ADMIN — METOPROLOL SUCCINATE 25 MG: 25 TABLET, FILM COATED, EXTENDED RELEASE ORAL at 08:14

## 2024-04-07 RX ADMIN — SACUBITRIL AND VALSARTAN 1 TABLET: 24; 26 TABLET, FILM COATED ORAL at 08:14

## 2024-04-07 RX ADMIN — QUETIAPINE FUMARATE 50 MG: 25 TABLET ORAL at 21:15

## 2024-04-07 RX ADMIN — SODIUM ZIRCONIUM CYCLOSILICATE 10 G: 10 POWDER, FOR SUSPENSION ORAL at 11:59

## 2024-04-07 RX ADMIN — INSULIN LISPRO 2 UNITS: 100 INJECTION, SOLUTION INTRAVENOUS; SUBCUTANEOUS at 17:12

## 2024-04-07 RX ADMIN — CALCIUM ACETATE 1334 MG: 667 CAPSULE ORAL at 16:36

## 2024-04-07 RX ADMIN — APIXABAN 5 MG: 5 TABLET, FILM COATED ORAL at 08:14

## 2024-04-07 ASSESSMENT — PAIN - FUNCTIONAL ASSESSMENT
PAIN_FUNCTIONAL_ASSESSMENT: ACTIVITIES ARE NOT PREVENTED
PAIN_FUNCTIONAL_ASSESSMENT: PREVENTS OR INTERFERES WITH MANY ACTIVE NOT PASSIVE ACTIVITIES
PAIN_FUNCTIONAL_ASSESSMENT: PREVENTS OR INTERFERES WITH MANY ACTIVE NOT PASSIVE ACTIVITIES
PAIN_FUNCTIONAL_ASSESSMENT: ACTIVITIES ARE NOT PREVENTED
PAIN_FUNCTIONAL_ASSESSMENT: PREVENTS OR INTERFERES WITH MANY ACTIVE NOT PASSIVE ACTIVITIES

## 2024-04-07 ASSESSMENT — PAIN DESCRIPTION - LOCATION
LOCATION: LEG;BACK
LOCATION: LEG
LOCATION: FOOT;LEG;HIP
LOCATION: LEG;HIP
LOCATION: FOOT;LEG
LOCATION: LEG;BACK;HIP
LOCATION: LEG

## 2024-04-07 ASSESSMENT — PAIN DESCRIPTION - PAIN TYPE
TYPE: ACUTE PAIN;SURGICAL PAIN
TYPE: ACUTE PAIN;SURGICAL PAIN

## 2024-04-07 ASSESSMENT — PAIN DESCRIPTION - FREQUENCY
FREQUENCY: CONTINUOUS
FREQUENCY: CONTINUOUS

## 2024-04-07 ASSESSMENT — PAIN DESCRIPTION - DESCRIPTORS
DESCRIPTORS: ACHING;CRAMPING;CRUSHING
DESCRIPTORS: SHARP
DESCRIPTORS: ACHING;THROBBING
DESCRIPTORS: ACHING;DISCOMFORT
DESCRIPTORS: ACHING;THROBBING

## 2024-04-07 ASSESSMENT — PAIN DESCRIPTION - ORIENTATION
ORIENTATION: RIGHT;LOWER
ORIENTATION: RIGHT
ORIENTATION: RIGHT
ORIENTATION: RIGHT;LOWER
ORIENTATION: RIGHT

## 2024-04-07 ASSESSMENT — PAIN SCALES - GENERAL
PAINLEVEL_OUTOF10: 10
PAINLEVEL_OUTOF10: 10
PAINLEVEL_OUTOF10: 7
PAINLEVEL_OUTOF10: 8
PAINLEVEL_OUTOF10: 8
PAINLEVEL_OUTOF10: 10
PAINLEVEL_OUTOF10: 10
PAINLEVEL_OUTOF10: 7
PAINLEVEL_OUTOF10: 6
PAINLEVEL_OUTOF10: 8

## 2024-04-07 ASSESSMENT — PAIN SCALES - WONG BAKER: WONGBAKER_NUMERICALRESPONSE: NO HURT

## 2024-04-07 ASSESSMENT — PAIN DESCRIPTION - ONSET
ONSET: ON-GOING
ONSET: ON-GOING

## 2024-04-07 ASSESSMENT — PAIN DESCRIPTION - DIRECTION: RADIATING_TOWARDS: HIP

## 2024-04-08 LAB
ALBUMIN SERPL-MCNC: 3.3 G/DL (ref 3.4–5)
ANION GAP SERPL CALCULATED.3IONS-SCNC: 14 MMOL/L (ref 3–16)
BASOPHILS # BLD: 0.1 K/UL (ref 0–0.2)
BASOPHILS NFR BLD: 0.9 %
BUN SERPL-MCNC: 57 MG/DL (ref 7–20)
CALCIUM SERPL-MCNC: 9.5 MG/DL (ref 8.3–10.6)
CHLORIDE SERPL-SCNC: 91 MMOL/L (ref 99–110)
CO2 SERPL-SCNC: 23 MMOL/L (ref 21–32)
CREAT SERPL-MCNC: 6.5 MG/DL (ref 0.9–1.3)
DEPRECATED RDW RBC AUTO: 17.1 % (ref 12.4–15.4)
EOSINOPHIL # BLD: 0.9 K/UL (ref 0–0.6)
EOSINOPHIL NFR BLD: 7.1 %
GFR SERPLBLD CREATININE-BSD FMLA CKD-EPI: 9 ML/MIN/{1.73_M2}
GLUCOSE BLD-MCNC: 157 MG/DL (ref 70–99)
GLUCOSE BLD-MCNC: 162 MG/DL (ref 70–99)
GLUCOSE BLD-MCNC: 181 MG/DL (ref 70–99)
GLUCOSE BLD-MCNC: 204 MG/DL (ref 70–99)
GLUCOSE SERPL-MCNC: 151 MG/DL (ref 70–99)
HCT VFR BLD AUTO: 30.6 % (ref 40.5–52.5)
HGB BLD-MCNC: 9.8 G/DL (ref 13.5–17.5)
LYMPHOCYTES # BLD: 0.9 K/UL (ref 1–5.1)
LYMPHOCYTES NFR BLD: 7.3 %
MAGNESIUM SERPL-MCNC: 1.9 MG/DL (ref 1.8–2.4)
MCH RBC QN AUTO: 28.3 PG (ref 26–34)
MCHC RBC AUTO-ENTMCNC: 32 G/DL (ref 31–36)
MCV RBC AUTO: 88.4 FL (ref 80–100)
MONOCYTES # BLD: 0.8 K/UL (ref 0–1.3)
MONOCYTES NFR BLD: 6.5 %
NEUTROPHILS # BLD: 9.5 K/UL (ref 1.7–7.7)
NEUTROPHILS NFR BLD: 78.2 %
PERFORMED ON: ABNORMAL
PHOSPHATE SERPL-MCNC: 4.2 MG/DL (ref 2.5–4.9)
PLATELET # BLD AUTO: 273 K/UL (ref 135–450)
PMV BLD AUTO: 7.4 FL (ref 5–10.5)
POTASSIUM SERPL-SCNC: 5.4 MMOL/L (ref 3.5–5.1)
RBC # BLD AUTO: 3.46 M/UL (ref 4.2–5.9)
SODIUM SERPL-SCNC: 128 MMOL/L (ref 136–145)
VANCOMYCIN SERPL-MCNC: 32.6 UG/ML
WBC # BLD AUTO: 12.1 K/UL (ref 4–11)

## 2024-04-08 PROCEDURE — 6370000000 HC RX 637 (ALT 250 FOR IP)

## 2024-04-08 PROCEDURE — 90935 HEMODIALYSIS ONE EVALUATION: CPT

## 2024-04-08 PROCEDURE — 2580000003 HC RX 258: Performed by: STUDENT IN AN ORGANIZED HEALTH CARE EDUCATION/TRAINING PROGRAM

## 2024-04-08 PROCEDURE — 85025 COMPLETE CBC W/AUTO DIFF WBC: CPT

## 2024-04-08 PROCEDURE — 36415 COLL VENOUS BLD VENIPUNCTURE: CPT

## 2024-04-08 PROCEDURE — 6370000000 HC RX 637 (ALT 250 FOR IP): Performed by: STUDENT IN AN ORGANIZED HEALTH CARE EDUCATION/TRAINING PROGRAM

## 2024-04-08 PROCEDURE — 6360000002 HC RX W HCPCS: Performed by: STUDENT IN AN ORGANIZED HEALTH CARE EDUCATION/TRAINING PROGRAM

## 2024-04-08 PROCEDURE — 6360000002 HC RX W HCPCS

## 2024-04-08 PROCEDURE — 36556 INSERT NON-TUNNEL CV CATH: CPT

## 2024-04-08 PROCEDURE — 2500000003 HC RX 250 WO HCPCS: Performed by: STUDENT IN AN ORGANIZED HEALTH CARE EDUCATION/TRAINING PROGRAM

## 2024-04-08 PROCEDURE — 83735 ASSAY OF MAGNESIUM: CPT

## 2024-04-08 PROCEDURE — 99222 1ST HOSP IP/OBS MODERATE 55: CPT | Performed by: INTERNAL MEDICINE

## 2024-04-08 PROCEDURE — 80069 RENAL FUNCTION PANEL: CPT

## 2024-04-08 PROCEDURE — 1200000000 HC SEMI PRIVATE

## 2024-04-08 PROCEDURE — 80202 ASSAY OF VANCOMYCIN: CPT

## 2024-04-08 PROCEDURE — 6360000002 HC RX W HCPCS: Performed by: NURSE PRACTITIONER

## 2024-04-08 RX ORDER — MIDODRINE HYDROCHLORIDE 5 MG/1
10 TABLET ORAL
Status: COMPLETED | OUTPATIENT
Start: 2024-04-08 | End: 2024-04-08

## 2024-04-08 RX ORDER — MIDODRINE HYDROCHLORIDE 5 MG/1
TABLET ORAL
Status: COMPLETED
Start: 2024-04-08 | End: 2024-04-08

## 2024-04-08 RX ORDER — FENTANYL CITRATE 50 UG/ML
50 INJECTION, SOLUTION INTRAMUSCULAR; INTRAVENOUS ONCE
Status: COMPLETED | OUTPATIENT
Start: 2024-04-08 | End: 2024-04-08

## 2024-04-08 RX ORDER — HEPARIN SODIUM 1000 [USP'U]/ML
INJECTION, SOLUTION INTRAVENOUS; SUBCUTANEOUS
Status: COMPLETED
Start: 2024-04-08 | End: 2024-04-08

## 2024-04-08 RX ADMIN — HYDROMORPHONE HYDROCHLORIDE 0.5 MG: 1 INJECTION, SOLUTION INTRAMUSCULAR; INTRAVENOUS; SUBCUTANEOUS at 09:36

## 2024-04-08 RX ADMIN — MIDODRINE HYDROCHLORIDE 10 MG: 5 TABLET ORAL at 09:24

## 2024-04-08 RX ADMIN — BUSPIRONE HYDROCHLORIDE 10 MG: 5 TABLET ORAL at 14:21

## 2024-04-08 RX ADMIN — SACUBITRIL AND VALSARTAN 1 TABLET: 24; 26 TABLET, FILM COATED ORAL at 20:47

## 2024-04-08 RX ADMIN — APIXABAN 5 MG: 5 TABLET, FILM COATED ORAL at 20:47

## 2024-04-08 RX ADMIN — TRAZODONE HYDROCHLORIDE 50 MG: 50 TABLET ORAL at 20:47

## 2024-04-08 RX ADMIN — SACUBITRIL AND VALSARTAN 1 TABLET: 24; 26 TABLET, FILM COATED ORAL at 13:33

## 2024-04-08 RX ADMIN — METOPROLOL SUCCINATE 25 MG: 25 TABLET, FILM COATED, EXTENDED RELEASE ORAL at 13:33

## 2024-04-08 RX ADMIN — OXYCODONE AND ACETAMINOPHEN 1 TABLET: 7.5; 325 TABLET ORAL at 17:40

## 2024-04-08 RX ADMIN — QUETIAPINE FUMARATE 50 MG: 25 TABLET ORAL at 20:47

## 2024-04-08 RX ADMIN — EPOETIN ALFA-EPBX 2000 UNITS: 2000 INJECTION, SOLUTION INTRAVENOUS; SUBCUTANEOUS at 09:24

## 2024-04-08 RX ADMIN — EPOETIN ALFA-EPBX 3000 UNITS: 3000 INJECTION, SOLUTION INTRAVENOUS; SUBCUTANEOUS at 09:25

## 2024-04-08 RX ADMIN — HYDROMORPHONE HYDROCHLORIDE 0.5 MG: 1 INJECTION, SOLUTION INTRAMUSCULAR; INTRAVENOUS; SUBCUTANEOUS at 14:33

## 2024-04-08 RX ADMIN — INSULIN GLARGINE 15 UNITS: 100 INJECTION, SOLUTION SUBCUTANEOUS at 20:47

## 2024-04-08 RX ADMIN — BUSPIRONE HYDROCHLORIDE 10 MG: 5 TABLET ORAL at 20:47

## 2024-04-08 RX ADMIN — Medication 10 ML: at 09:37

## 2024-04-08 RX ADMIN — HEPARIN SODIUM 3600 UNITS: 1000 INJECTION INTRAVENOUS; SUBCUTANEOUS at 09:26

## 2024-04-08 RX ADMIN — SODIUM CHLORIDE, PRESERVATIVE FREE 10 ML: 5 INJECTION INTRAVENOUS at 09:36

## 2024-04-08 RX ADMIN — SODIUM CHLORIDE, PRESERVATIVE FREE 10 ML: 5 INJECTION INTRAVENOUS at 20:44

## 2024-04-08 RX ADMIN — CALCIUM ACETATE 1334 MG: 667 CAPSULE ORAL at 17:10

## 2024-04-08 RX ADMIN — OXYCODONE AND ACETAMINOPHEN 1 TABLET: 7.5; 325 TABLET ORAL at 13:34

## 2024-04-08 RX ADMIN — HYDROMORPHONE HYDROCHLORIDE 0.5 MG: 1 INJECTION, SOLUTION INTRAMUSCULAR; INTRAVENOUS; SUBCUTANEOUS at 20:43

## 2024-04-08 RX ADMIN — FENTANYL CITRATE 50 MCG: 50 INJECTION, SOLUTION INTRAMUSCULAR; INTRAVENOUS at 00:28

## 2024-04-08 RX ADMIN — CALCIUM ACETATE 1334 MG: 667 CAPSULE ORAL at 13:34

## 2024-04-08 RX ADMIN — Medication 10 ML: at 14:35

## 2024-04-08 RX ADMIN — PRAVASTATIN SODIUM 40 MG: 40 TABLET ORAL at 13:33

## 2024-04-08 RX ADMIN — APIXABAN 5 MG: 5 TABLET, FILM COATED ORAL at 13:34

## 2024-04-08 RX ADMIN — OXYCODONE AND ACETAMINOPHEN 1 TABLET: 7.5; 325 TABLET ORAL at 07:44

## 2024-04-08 RX ADMIN — DULOXETINE HYDROCHLORIDE 60 MG: 60 CAPSULE, DELAYED RELEASE ORAL at 13:33

## 2024-04-08 ASSESSMENT — PAIN SCALES - GENERAL
PAINLEVEL_OUTOF10: 9
PAINLEVEL_OUTOF10: 10
PAINLEVEL_OUTOF10: 8
PAINLEVEL_OUTOF10: 9
PAINLEVEL_OUTOF10: 10
PAINLEVEL_OUTOF10: 9
PAINLEVEL_OUTOF10: 10
PAINLEVEL_OUTOF10: 8
PAINLEVEL_OUTOF10: 0

## 2024-04-08 ASSESSMENT — PAIN DESCRIPTION - PAIN TYPE
TYPE: ACUTE PAIN;SURGICAL PAIN
TYPE: SURGICAL PAIN;CHRONIC PAIN
TYPE: ACUTE PAIN;SURGICAL PAIN

## 2024-04-08 ASSESSMENT — PAIN SCALES - WONG BAKER
WONGBAKER_NUMERICALRESPONSE: NO HURT

## 2024-04-08 ASSESSMENT — PAIN DESCRIPTION - ORIENTATION
ORIENTATION: RIGHT;LOWER
ORIENTATION: RIGHT
ORIENTATION: RIGHT;LOWER

## 2024-04-08 ASSESSMENT — PAIN DESCRIPTION - LOCATION
LOCATION: LEG;BACK
LOCATION: LEG;BACK
LOCATION: FOOT
LOCATION: LEG;BACK
LOCATION: LEG;BACK

## 2024-04-08 ASSESSMENT — PAIN DESCRIPTION - ONSET
ONSET: ON-GOING

## 2024-04-08 ASSESSMENT — PAIN - FUNCTIONAL ASSESSMENT
PAIN_FUNCTIONAL_ASSESSMENT: PREVENTS OR INTERFERES WITH ALL ACTIVE AND SOME PASSIVE ACTIVITIES
PAIN_FUNCTIONAL_ASSESSMENT: PREVENTS OR INTERFERES WITH ALL ACTIVE AND SOME PASSIVE ACTIVITIES
PAIN_FUNCTIONAL_ASSESSMENT: ACTIVITIES ARE NOT PREVENTED
PAIN_FUNCTIONAL_ASSESSMENT: PREVENTS OR INTERFERES WITH ALL ACTIVE AND SOME PASSIVE ACTIVITIES
PAIN_FUNCTIONAL_ASSESSMENT: PREVENTS OR INTERFERES WITH ALL ACTIVE AND SOME PASSIVE ACTIVITIES

## 2024-04-08 ASSESSMENT — PAIN DESCRIPTION - DESCRIPTORS
DESCRIPTORS: SHARP
DESCRIPTORS: SHARP;PRESSURE

## 2024-04-08 ASSESSMENT — PAIN DESCRIPTION - FREQUENCY
FREQUENCY: CONTINUOUS

## 2024-04-08 NOTE — CONSULTS
Pharmacy Note  Vancomycin Consult    Igor Ann is a 53 y.o. male started on Vancomycin for sepsis; consult received from Dr. Rice to manage therapy.     Allergies:  Morphine       Recent Labs     03/27/24  0025   CREATININE 6.4*       Recent Labs     03/27/24  0025   WBC 12.7*       Estimated Creatinine Clearance: 14 mL/min (A) (based on SCr of 6.4 mg/dL (HH)).    No intake or output data in the 24 hours ending 03/27/24 0314    Wt Readings from Last 1 Encounters:   03/27/24 85 kg (187 lb 6.3 oz)         Body mass index is 27.67 kg/m².    Loading dose (critically ill or in ICU, require dialysis or renal replacement therapy): Vancomycin 25 mg/kg IVPB x 1 (maximum 2500 mg).  Maintenance dose: 10-20 mg/kg (maximum: 2000 mg/dose and 4500 mg/day) starting at the next dosing interval determined by renal function  Pulse dose: fluctuating renal function, CAESAR, ESRD  CRRT: 7.5-10 mg/kg q12h   Goal Vancomycin trough: 15-20 mcg/mL   Goal Vancomycin AUC: 400-600     Assessment/Plan:  Will initiate Vancomycin with a one time loading dose of 2000 mg x1  Thank you for the consult.  Domingo Marrero PharmD 3/27/2024  3:15 AM  
4/2/24 @ 0913 notified Pod of consult. Kylah Dhaliwal  
[facility]  HEART FAILURE PROGRAM      Igor SANCHEZ Marissa 1970    History:  Past Medical History:   Diagnosis Date    Ambulatory dysfunction     walker for long distances, SOB with distance    Aortic stenosis     echo 2017    Arthritis     hands and hips    Asthma     Bilateral hilar adenopathy syndrome 06/03/2013    CAD (coronary artery disease)     Dr. Javier Chanel Heart    Cardiomyopathy ([facility]) 04/19/2019    EF= 43%    CHF (congestive heart failure) ([facility])     Chronic pain     COPD (chronic obstructive pulmonary disease) ([facility])     pulmonology Dr. Batista    CVA (cerebral vascular accident) ([facility]) 05/21/2017    Depression     Diabetes mellitus ([facility])     borderline    Difficult intravenous access     Emphysema of lung ([facility])     ESRD (end stage renal disease) on dialysis ([facility])     MWF    Fear of needles     Gastric ulcer     GERD (gastroesophageal reflux disease)     Heart valve problem     bicuspic valve    Hemodialysis patient ([facility])     History of spinal fracture     work incident    Hx of blood clots     Bilateral lower extremities; stents in place    Hyperlipidemia     Hypertension     MI (myocardial infarction) ([facility]) 2019    has had 9 MIs. 2019 was the last    Neuromuscular disorder ([facility])     due to CVA    Numbness and tingling in left arm     from fistula    Pneumonia     PONV (postoperative nausea and vomiting)     Prolonged emergence from general anesthesia     States requires more medication than most people    Sleep apnea     Uses CPAP    Stroke ([facility])     7mm thalamic cva 2017 deficts left side, left side weakness    TIA (transient ischemic attack)     Unspecified diseases of blood and blood-forming organs        ECHO:  3/13/24   EF 25-30%  HgA1C: 3/27/24   7.4  Iron Saturation: 12/8/23   9  Ferritin: 1/12/22   624.0    ACE/ARB/ARNI: Entresto 24-26mg tablet BID  BB: Toprol XL 25 mg daily   Spironolactone:  SGLT2:    Last Hospital Admission: 1/9/24   shortness of breath.     Discharge 
Consult called nephro to dr. Carlos. Ofe salinas. 10:36am  
RD received consult for CHF diet education. Pt has received multiple educations in the past, declined education today. Reports good appetite. Would like protein drinks added, RD to add.   Will continue to monitor per nutrition standards of care.     Alesia Santos MS, RD, LD on 3/28/2024 at 12:13 PM  Office: [phone]      
general anesthesia     States requires more medication than most people    Sleep apnea     Uses CPAP    Stroke (HCC)     7mm thalamic cva 2017 deficts left side, left side weakness    TIA (transient ischemic attack)     Unspecified diseases of blood and blood-forming organs        Past Surgical History:        Procedure Laterality Date    AORTIC VALVE REPLACEMENT N/A 10/15/2019    TRANSCATHETER AORTIC VALVE REPLACEMENT FEMORAL APPROACH performed by Dion Holland MD at [facility] CVOR    CATHETER REMOVAL Right 7/2/2019    PERITONEAL DIALYSIS CATHETER REMOVAL performed by Remi Knicaid MD at [facility] OR    COLONOSCOPY      COLONOSCOPY  2/29/2015    WNL    CORONARY ANGIOPLASTY WITH STENT PLACEMENT  05/26/15    CYST REMOVAL  08/14/2013    EXCISION CYSTS, NECK X2 AND ABDOMINAL benign    DIAGNOSTIC CARDIAC CATH LAB PROCEDURE      DIALYSIS FISTULA CREATION Left 10/30/2017    LEFT BRACHIAL CEPHALIC FISTULA    DIALYSIS FISTULA CREATION Left 3/27/2019    LIGATION  AV FISTULA performed by Brenden Rosario MD at [facility] OR    ENDOSCOPY, COLON, DIAGNOSTIC      FOOT DEBRIDEMENT Right 5/26/2023    INCISION AND DEBRIDEMENT RIGHT FOOT WITH performed by Cely Rodriguez DPM at [facility] OR    FOOT SURGERY Right 5/26/2023    RIGHT FIFTH RAY AMPUTATION performed by Cely Rodriguez DPM at [facility] OR    IR TUNNELED CATHETER PLACEMENT GREATER THAN 5 YEARS  3/21/2022    IR TUNNELED CATHETER PLACEMENT GREATER THAN 5 YEARS 3/21/2022 [facility] SPECIAL PROCEDURES    IR TUNNELED CATHETER PLACEMENT GREATER THAN 5 YEARS  4/21/2022    IR TUNNELED CATHETER PLACEMENT GREATER THAN 5 YEARS 4/21/2022 [facility] SPECIAL PROCEDURES    IR TUNNELED CATHETER PLACEMENT GREATER THAN 5 YEARS  4/26/2022    IR TUNNELED CATHETER PLACEMENT GREATER THAN 5 YEARS 4/26/2022 [facility] SPECIAL PROCEDURES    IR TUNNELED CATHETER PLACEMENT GREATER THAN 5 YEARS  6/23/2022    IR TUNNELED CATHETER PLACEMENT GREATER THAN 5 YEARS 6/23/2022 [facility] SPECIAL PROCEDURES    OTHER SURGICAL HISTORY  
level Wednesday morning before next HD session    Homar Bray PharmD 4/8/2024 8:57 AM    Day 3  Estimated Creatinine Clearance: 15 mL/min (A) (based on SCr of 6.5 mg/dL (HH)).  Current regimen: pulse dose- HD MWF  Plan: recheck level tomorrow morning before next HD session    Homar Bray PharmD 4/9/2024 8:23 AM  
  Transportation Needs: Patient Declined (1/15/2024)    PRAPARE - Transportation     Lack of Transportation (Medical): Patient declined     Lack of Transportation (Non-Medical): Patient declined   Physical Activity: Not on file   Stress: Not on file   Social Connections: Not on file   Intimate Partner Violence: Not on file   Housing Stability: Low Risk  (1/15/2024)    Housing Stability Vital Sign     Unable to Pay for Housing in the Last Year: No     Number of Places Lived in the Last Year: 1     Unstable Housing in the Last Year: No       Family History:       Problem Relation Age of Onset    Diabetes Mother     Heart Disease Father     Kidney Disease Sister         stage 4-kidney failure    Cancer Sister     Heart Disease Sister     Obesity Sister     Cancer Sister     Heart Disease Sister     Obesity Sister     Alcohol Abuse Brother          REVIEW OF SYSTEMS:    Review of Systems   Constitutional: Negative.    HENT: Negative.     Respiratory:  Positive for shortness of breath.    Cardiovascular:  Positive for leg swelling.   Genitourinary:  Positive for decreased urine volume.   Musculoskeletal: Negative.    Neurological: Negative.    Psychiatric/Behavioral: Negative.           PHYSICAL EXAM:    Vitals:    /65   Pulse 84   Temp 97.9 °F (36.6 °C) (Oral)   Resp 18   Wt 85 kg (187 lb 6.3 oz)   SpO2 100%   BMI 27.67 kg/m²   I/O last 3 completed shifts:  In: 250 [IV Piggyback:250]  Out: -   No intake/output data recorded.    Physical Exam:  Physical Exam  Vitals reviewed.   Constitutional:       General: He is not in acute distress.     Appearance: Normal appearance.   HENT:      Head: Normocephalic and atraumatic.   Eyes:      General: No scleral icterus.     Conjunctiva/sclera: Conjunctivae normal.   Cardiovascular:      Rate and Rhythm: Normal rate.      Heart sounds:      No friction rub.   Pulmonary:      Effort: Pulmonary effort is normal.      Breath sounds: Rales present.   Abdominal:      
(DEMADEX) 100 MG tablet Take 1 tablet by mouth daily      traZODone (DESYREL) 50 MG tablet Take 1 tablet by mouth daily      DULoxetine (CYMBALTA) 60 MG extended release capsule Take 1 capsule by mouth daily 30 capsule 3    pravastatin (PRAVACHOL) 40 MG tablet TAKE 1 TABLET BY MOUTH DAILY 90 tablet 2    clopidogrel (PLAVIX) 75 MG tablet TAKE 1 TABLET BY MOUTH DAILY (Patient not taking: Reported on 1/24/2024) 90 tablet 2    QUEtiapine (SEROQUEL) 50 MG tablet TAKE 1 TABLET BY MOUTH EVERY EVENING 30 tablet 10    Calcium Acetate, Phos Binder, 667 MG CAPS TAKE 1 CAPSULE BY MOUTH THREE TIMES DAILY WITH MEALS (Patient taking differently: Take 2 capsules by mouth 3 times daily) 90 capsule 3    ipratropium-albuterol (DUONEB) 0.5-2.5 (3) MG/3ML SOLN nebulizer solution Inhale 3 mLs into the lungs every 6 hours as needed for Shortness of Breath 360 mL 1       Objective; sitting up in bed    /64   Pulse 73   Temp 97.5 °F (36.4 °C) (Oral)   Resp 18   Ht 1.753 m (5' 9\")   Wt 95.9 kg (211 lb 6.4 oz)   SpO2 95% Comment: 4L O2  BMI 31.22 kg/m²     LABS:  WBC:    Lab Results   Component Value Date/Time    WBC 9.2 03/30/2024 07:31 AM     H/H:    Lab Results   Component Value Date/Time    HGB 10.5 03/30/2024 07:31 AM    HCT 32.9 03/30/2024 07:31 AM     PTT:    Lab Results   Component Value Date/Time    APTT 22.2 09/27/2023 12:14 AM   [APTT}  PT/INR:    Lab Results   Component Value Date/Time    PROTIME 13.7 01/02/2024 05:50 PM    INR 1.05 01/02/2024 05:50 PM     HgBA1c:    Lab Results   Component Value Date/Time    LABA1C 7.4 03/27/2024 12:25 AM       Assessment   Isaac Risk Score: Isaac Scale Score: 19    Acute on chronic systolic CHF (congestive heart failure) (HCC)    Measurements:  Wound 04/01/24 Foot Right;Lateral eschar moist (Active)   Wound Image   04/01/24 1234   Wound Etiology Non-Healing Surgical 04/01/24 1234   Dressing Status New dressing applied 04/01/24 1234   Wound Cleansed Cleansed with saline 04/01/24 
   Obesity    ZAINAB on CPAP    Degeneration of lumbar or lumbosacral intervertebral disc    Lumbar radiculopathy    Lumbosacral spondylosis without myelopathy    Biliary colic    Symptomatic cholelithiasis    Gastroparesis due to DM ([facility])    Angina, class IV ([facility])    Dyspnea    Dyslipidemia    Acute on chronic combined systolic and diastolic CHF (congestive heart failure) ([facility])    Ischemic cardiomyopathy    Tobacco abuse    CVA (cerebral vascular accident) ([facility])    History of CVA (cerebrovascular accident)    Type 2 diabetes mellitus without complication, without long-term current use of insulin ([facility])    ZAINAB treated with BiPAP    Pleural effusion on left    Chronic anemia    Nonrheumatic aortic valve stenosis    Mucus plugging of bronchi    Hemodialysis-associated hypotension    ESRD on dialysis ([facility])    Hypotension due to drugs    Acute diastolic CHF (congestive heart failure) ([facility])    Neuromuscular disorder ([facility])    Renovascular hypertension    Mixed hyperlipidemia    Cigarette nicotine dependence in remission    Pulmonary edema    Fluid overload    Anemia of chronic disease    SOB (shortness of breath) on exertion    Steal syndrome of dialysis vascular access ([facility])    Chronic, continuous use of opioids    Chronic bronchitis ([facility])    Nasal congestion    Hypercholesteremia    Bradycardia    S/P TAVR (transcatheter aortic valve replacement)    Syncope and collapse    PAF (paroxysmal atrial fibrillation) ([facility])    Bilateral leg weakness    GBS (Guillain-[place] syndrome) ([facility])    Sinus pause    Weakness of both lower extremities    Sinus bradycardia    Ataxia    Peripheral vascular occlusive disease ([facility])    Cellulitis of right foot    Iliac artery occlusion, right ([facility])    Cellulitis and abscess of hand    Uncontrolled type 2 diabetes mellitus with hyperglycemia ([facility])    Acute encephalopathy    Acute hypoxemic respiratory failure ([facility])    Acute CVA (cerebrovascular accident) ([facility])    Speech problem    Urinary tract 
11.4*   HGB 10.3* 10.6* 10.9*   HCT 32.5* 32.9* 34.5*   MCV 89.2 88.5 88.7    310 292     BMP:   Recent Labs     03/27/24  0025 03/27/24  0128 03/28/24  1140 03/29/24  0538   *  --  132* 128*   K 5.9* 4.8 4.5 5.1   CL 88*  --  92* 89*   CO2 26  --  27 25   BUN 48*  --  29* 50*   CREATININE 6.4*  --  4.9* 5.8*   CALCIUM 9.7  --  9.7 9.1   MG  --   --  1.90 1.90     A1C:   Hemoglobin A1C   Date Value Ref Range Status   03/27/2024 7.4 See comment % Final     Comment:     Comment:  Diagnosis of Diabetes: > or = 6.5%  Increased risk of diabetes (Prediabetes): 5.7-6.4%  Glycemic Control: Nonpregnant Adults: <7.0%                    Pregnant: <6.0%         Liver Profile:  Lab Results   Component Value Date/Time    AST 37 03/27/2024 12:25 AM    ALT 11 03/27/2024 12:25 AM    BILIDIR <0.2 10/10/2019 10:00 AM    BILITOT 0.3 03/27/2024 12:25 AM    ALKPHOS 163 03/27/2024 12:25 AM     Lab Results   Component Value Date/Time    CHOL 134 03/28/2024 11:40 AM    HDL 49 03/28/2024 11:40 AM    TRIG 95 03/28/2024 11:40 AM     TSH:  Lab Results   Component Value Date/Time    TSH 2.15 03/17/2022 08:33 PM       Assessment:    PVD      Plan:         Thank you for allowing to us to participate in the care of Igor Ann    VASCULAR STAFF  Seen with Skylar Borja CNP today.  Agree with above in all aspects.  IMP: Nonhealing R foot wounds S/P 5th toe amputation   Tibial level occlusive disease R leg   REC: Local care per podiatry.   Will obtain arterial duplex R leg Monday - may need angiogram after duplex for evaluation and treatment.   Dr. Rosario to see again on Monday - call for any intervening problems or questions.     Samson So

## 2024-04-09 LAB
ALBUMIN SERPL-MCNC: 3.3 G/DL (ref 3.4–5)
ANION GAP SERPL CALCULATED.3IONS-SCNC: 13 MMOL/L (ref 3–16)
BACTERIA SPEC AEROBE CULT: ABNORMAL
BACTERIA SPEC AEROBE CULT: NORMAL
BACTERIA SPEC ANAEROBE CULT: ABNORMAL
BASOPHILS # BLD: 0.1 K/UL (ref 0–0.2)
BASOPHILS NFR BLD: 0.7 %
BUN SERPL-MCNC: 48 MG/DL (ref 7–20)
CALCIUM SERPL-MCNC: 9.6 MG/DL (ref 8.3–10.6)
CHLORIDE SERPL-SCNC: 88 MMOL/L (ref 99–110)
CO2 SERPL-SCNC: 25 MMOL/L (ref 21–32)
CREAT SERPL-MCNC: 5.4 MG/DL (ref 0.9–1.3)
DEPRECATED RDW RBC AUTO: 17.5 % (ref 12.4–15.4)
EOSINOPHIL # BLD: 0.7 K/UL (ref 0–0.6)
EOSINOPHIL NFR BLD: 5.8 %
GFR SERPLBLD CREATININE-BSD FMLA CKD-EPI: 12 ML/MIN/{1.73_M2}
GLUCOSE BLD-MCNC: 133 MG/DL (ref 70–99)
GLUCOSE BLD-MCNC: 147 MG/DL (ref 70–99)
GLUCOSE BLD-MCNC: 158 MG/DL (ref 70–99)
GLUCOSE BLD-MCNC: 91 MG/DL (ref 70–99)
GLUCOSE SERPL-MCNC: 115 MG/DL (ref 70–99)
GRAM STN SPEC: ABNORMAL
GRAM STN SPEC: NORMAL
HCT VFR BLD AUTO: 29.8 % (ref 40.5–52.5)
HGB BLD-MCNC: 9.5 G/DL (ref 13.5–17.5)
LYMPHOCYTES # BLD: 0.9 K/UL (ref 1–5.1)
LYMPHOCYTES NFR BLD: 7.4 %
MAGNESIUM SERPL-MCNC: 1.9 MG/DL (ref 1.8–2.4)
MCH RBC QN AUTO: 27.8 PG (ref 26–34)
MCHC RBC AUTO-ENTMCNC: 31.7 G/DL (ref 31–36)
MCV RBC AUTO: 87.8 FL (ref 80–100)
MONOCYTES # BLD: 1.1 K/UL (ref 0–1.3)
MONOCYTES NFR BLD: 8.6 %
NEUTROPHILS # BLD: 9.7 K/UL (ref 1.7–7.7)
NEUTROPHILS NFR BLD: 77.5 %
ORGANISM: ABNORMAL
PERFORMED ON: ABNORMAL
PERFORMED ON: NORMAL
PHOSPHATE SERPL-MCNC: 4 MG/DL (ref 2.5–4.9)
PLATELET # BLD AUTO: 255 K/UL (ref 135–450)
PMV BLD AUTO: 7.2 FL (ref 5–10.5)
POTASSIUM SERPL-SCNC: 4.8 MMOL/L (ref 3.5–5.1)
RBC # BLD AUTO: 3.4 M/UL (ref 4.2–5.9)
SODIUM SERPL-SCNC: 126 MMOL/L (ref 136–145)
WBC # BLD AUTO: 12.5 K/UL (ref 4–11)

## 2024-04-09 PROCEDURE — 97530 THERAPEUTIC ACTIVITIES: CPT

## 2024-04-09 PROCEDURE — 6370000000 HC RX 637 (ALT 250 FOR IP): Performed by: INTERNAL MEDICINE

## 2024-04-09 PROCEDURE — 90935 HEMODIALYSIS ONE EVALUATION: CPT

## 2024-04-09 PROCEDURE — 99231 SBSQ HOSP IP/OBS SF/LOW 25: CPT | Performed by: INTERNAL MEDICINE

## 2024-04-09 PROCEDURE — 80069 RENAL FUNCTION PANEL: CPT

## 2024-04-09 PROCEDURE — 2580000003 HC RX 258: Performed by: STUDENT IN AN ORGANIZED HEALTH CARE EDUCATION/TRAINING PROGRAM

## 2024-04-09 PROCEDURE — 6370000000 HC RX 637 (ALT 250 FOR IP): Performed by: STUDENT IN AN ORGANIZED HEALTH CARE EDUCATION/TRAINING PROGRAM

## 2024-04-09 PROCEDURE — 2700000000 HC OXYGEN THERAPY PER DAY

## 2024-04-09 PROCEDURE — 2500000003 HC RX 250 WO HCPCS: Performed by: STUDENT IN AN ORGANIZED HEALTH CARE EDUCATION/TRAINING PROGRAM

## 2024-04-09 PROCEDURE — 1200000000 HC SEMI PRIVATE

## 2024-04-09 PROCEDURE — 85025 COMPLETE CBC W/AUTO DIFF WBC: CPT

## 2024-04-09 PROCEDURE — 94761 N-INVAS EAR/PLS OXIMETRY MLT: CPT

## 2024-04-09 PROCEDURE — 83735 ASSAY OF MAGNESIUM: CPT

## 2024-04-09 PROCEDURE — 6360000002 HC RX W HCPCS: Performed by: STUDENT IN AN ORGANIZED HEALTH CARE EDUCATION/TRAINING PROGRAM

## 2024-04-09 RX ORDER — DOXYCYCLINE HYCLATE 100 MG
100 TABLET ORAL EVERY 12 HOURS SCHEDULED
Status: DISCONTINUED | OUTPATIENT
Start: 2024-04-09 | End: 2024-04-11 | Stop reason: HOSPADM

## 2024-04-09 RX ADMIN — APIXABAN 5 MG: 5 TABLET, FILM COATED ORAL at 20:14

## 2024-04-09 RX ADMIN — SODIUM CHLORIDE, PRESERVATIVE FREE 10 ML: 5 INJECTION INTRAVENOUS at 08:21

## 2024-04-09 RX ADMIN — OXYCODONE AND ACETAMINOPHEN 1 TABLET: 7.5; 325 TABLET ORAL at 08:21

## 2024-04-09 RX ADMIN — INSULIN LISPRO 2 UNITS: 100 INJECTION, SOLUTION INTRAVENOUS; SUBCUTANEOUS at 12:01

## 2024-04-09 RX ADMIN — CALCIUM ACETATE 1334 MG: 667 CAPSULE ORAL at 12:01

## 2024-04-09 RX ADMIN — CALCIUM ACETATE 1334 MG: 667 CAPSULE ORAL at 18:40

## 2024-04-09 RX ADMIN — HYDROMORPHONE HYDROCHLORIDE 0.5 MG: 1 INJECTION, SOLUTION INTRAMUSCULAR; INTRAVENOUS; SUBCUTANEOUS at 20:16

## 2024-04-09 RX ADMIN — METOPROLOL SUCCINATE 25 MG: 25 TABLET, FILM COATED, EXTENDED RELEASE ORAL at 08:20

## 2024-04-09 RX ADMIN — DULOXETINE HYDROCHLORIDE 60 MG: 60 CAPSULE, DELAYED RELEASE ORAL at 08:20

## 2024-04-09 RX ADMIN — OXYCODONE AND ACETAMINOPHEN 1 TABLET: 7.5; 325 TABLET ORAL at 13:23

## 2024-04-09 RX ADMIN — BUSPIRONE HYDROCHLORIDE 10 MG: 5 TABLET ORAL at 08:21

## 2024-04-09 RX ADMIN — SACUBITRIL AND VALSARTAN 1 TABLET: 24; 26 TABLET, FILM COATED ORAL at 20:13

## 2024-04-09 RX ADMIN — BUSPIRONE HYDROCHLORIDE 10 MG: 5 TABLET ORAL at 13:23

## 2024-04-09 RX ADMIN — BUSPIRONE HYDROCHLORIDE 10 MG: 5 TABLET ORAL at 20:14

## 2024-04-09 RX ADMIN — INSULIN GLARGINE 15 UNITS: 100 INJECTION, SOLUTION SUBCUTANEOUS at 20:15

## 2024-04-09 RX ADMIN — QUETIAPINE FUMARATE 50 MG: 25 TABLET ORAL at 20:14

## 2024-04-09 RX ADMIN — INSULIN LISPRO 2 UNITS: 100 INJECTION, SOLUTION INTRAVENOUS; SUBCUTANEOUS at 18:41

## 2024-04-09 RX ADMIN — CALCIUM ACETATE 1334 MG: 667 CAPSULE ORAL at 08:11

## 2024-04-09 RX ADMIN — DOXYCYCLINE HYCLATE 100 MG: 100 TABLET, COATED ORAL at 20:13

## 2024-04-09 RX ADMIN — PRAVASTATIN SODIUM 40 MG: 40 TABLET ORAL at 08:21

## 2024-04-09 RX ADMIN — APIXABAN 5 MG: 5 TABLET, FILM COATED ORAL at 08:21

## 2024-04-09 RX ADMIN — TRAZODONE HYDROCHLORIDE 50 MG: 50 TABLET ORAL at 20:14

## 2024-04-09 RX ADMIN — OXYCODONE AND ACETAMINOPHEN 1 TABLET: 7.5; 325 TABLET ORAL at 18:40

## 2024-04-09 RX ADMIN — SACUBITRIL AND VALSARTAN 1 TABLET: 24; 26 TABLET, FILM COATED ORAL at 08:20

## 2024-04-09 ASSESSMENT — PAIN DESCRIPTION - ORIENTATION
ORIENTATION: RIGHT;LOWER;MID
ORIENTATION: RIGHT

## 2024-04-09 ASSESSMENT — PAIN - FUNCTIONAL ASSESSMENT: PAIN_FUNCTIONAL_ASSESSMENT: PREVENTS OR INTERFERES WITH ALL ACTIVE AND SOME PASSIVE ACTIVITIES

## 2024-04-09 ASSESSMENT — PAIN DESCRIPTION - ONSET: ONSET: ON-GOING

## 2024-04-09 ASSESSMENT — PAIN DESCRIPTION - FREQUENCY: FREQUENCY: CONTINUOUS

## 2024-04-09 ASSESSMENT — PAIN SCALES - WONG BAKER
WONGBAKER_NUMERICALRESPONSE: NO HURT
WONGBAKER_NUMERICALRESPONSE: HURTS WHOLE LOT
WONGBAKER_NUMERICALRESPONSE: NO HURT

## 2024-04-09 ASSESSMENT — PAIN SCALES - GENERAL
PAINLEVEL_OUTOF10: 6
PAINLEVEL_OUTOF10: 10
PAINLEVEL_OUTOF10: 8

## 2024-04-09 ASSESSMENT — PAIN DESCRIPTION - LOCATION
LOCATION: LEG
LOCATION: FOOT
LOCATION: BACK;FOOT

## 2024-04-09 ASSESSMENT — PAIN DESCRIPTION - PAIN TYPE
TYPE: ACUTE PAIN
TYPE: ACUTE PAIN

## 2024-04-09 ASSESSMENT — PAIN DESCRIPTION - DESCRIPTORS
DESCRIPTORS: ACHING
DESCRIPTORS: ACHING
DESCRIPTORS: ACHING;DISCOMFORT
DESCRIPTORS: ACHING;DISCOMFORT
DESCRIPTORS: ACHING

## 2024-04-10 LAB
ANION GAP SERPL CALCULATED.3IONS-SCNC: 15 MMOL/L (ref 3–16)
BASOPHILS # BLD: 0.1 K/UL (ref 0–0.2)
BASOPHILS # BLD: 0.1 K/UL (ref 0–0.2)
BASOPHILS NFR BLD: 0.6 %
BASOPHILS NFR BLD: 1.2 %
BUN SERPL-MCNC: 59 MG/DL (ref 7–20)
CALCIUM SERPL-MCNC: 9.5 MG/DL (ref 8.3–10.6)
CHLORIDE SERPL-SCNC: 86 MMOL/L (ref 99–110)
CO2 SERPL-SCNC: 23 MMOL/L (ref 21–32)
CREAT SERPL-MCNC: 6.7 MG/DL (ref 0.9–1.3)
DEPRECATED RDW RBC AUTO: 17 % (ref 12.4–15.4)
DEPRECATED RDW RBC AUTO: 17.2 % (ref 12.4–15.4)
EOSINOPHIL # BLD: 0.5 K/UL (ref 0–0.6)
EOSINOPHIL # BLD: 0.8 K/UL (ref 0–0.6)
EOSINOPHIL NFR BLD: 5.3 %
EOSINOPHIL NFR BLD: 7.6 %
GFR SERPLBLD CREATININE-BSD FMLA CKD-EPI: 9 ML/MIN/{1.73_M2}
GLUCOSE BLD-MCNC: 127 MG/DL (ref 70–99)
GLUCOSE BLD-MCNC: 168 MG/DL (ref 70–99)
GLUCOSE BLD-MCNC: 169 MG/DL (ref 70–99)
GLUCOSE BLD-MCNC: 230 MG/DL (ref 70–99)
GLUCOSE SERPL-MCNC: 112 MG/DL (ref 70–99)
HCT VFR BLD AUTO: 30.3 % (ref 40.5–52.5)
HCT VFR BLD AUTO: 31.4 % (ref 40.5–52.5)
HGB BLD-MCNC: 10.1 G/DL (ref 13.5–17.5)
HGB BLD-MCNC: 9.5 G/DL (ref 13.5–17.5)
LYMPHOCYTES # BLD: 0.5 K/UL (ref 1–5.1)
LYMPHOCYTES # BLD: 1.1 K/UL (ref 1–5.1)
LYMPHOCYTES NFR BLD: 10.5 %
LYMPHOCYTES NFR BLD: 6 %
MAGNESIUM SERPL-MCNC: 2 MG/DL (ref 1.8–2.4)
MCH RBC QN AUTO: 27.5 PG (ref 26–34)
MCH RBC QN AUTO: 28.3 PG (ref 26–34)
MCHC RBC AUTO-ENTMCNC: 31.3 G/DL (ref 31–36)
MCHC RBC AUTO-ENTMCNC: 32.2 G/DL (ref 31–36)
MCV RBC AUTO: 87.7 FL (ref 80–100)
MCV RBC AUTO: 88 FL (ref 80–100)
MONOCYTES # BLD: 0.6 K/UL (ref 0–1.3)
MONOCYTES # BLD: 0.9 K/UL (ref 0–1.3)
MONOCYTES NFR BLD: 7.2 %
MONOCYTES NFR BLD: 8.5 %
NEUTROPHILS # BLD: 7.3 K/UL (ref 1.7–7.7)
NEUTROPHILS # BLD: 7.3 K/UL (ref 1.7–7.7)
NEUTROPHILS NFR BLD: 72.2 %
NEUTROPHILS NFR BLD: 80.9 %
PERFORMED ON: ABNORMAL
PLATELET # BLD AUTO: 242 K/UL (ref 135–450)
PLATELET # BLD AUTO: 249 K/UL (ref 135–450)
PMV BLD AUTO: 7.6 FL (ref 5–10.5)
PMV BLD AUTO: 7.6 FL (ref 5–10.5)
POTASSIUM SERPL-SCNC: 5 MMOL/L (ref 3.5–5.1)
RBC # BLD AUTO: 3.46 M/UL (ref 4.2–5.9)
RBC # BLD AUTO: 3.58 M/UL (ref 4.2–5.9)
SODIUM SERPL-SCNC: 124 MMOL/L (ref 136–145)
WBC # BLD AUTO: 10.1 K/UL (ref 4–11)
WBC # BLD AUTO: 9 K/UL (ref 4–11)

## 2024-04-10 PROCEDURE — 6360000002 HC RX W HCPCS

## 2024-04-10 PROCEDURE — 90935 HEMODIALYSIS ONE EVALUATION: CPT

## 2024-04-10 PROCEDURE — 80048 BASIC METABOLIC PNL TOTAL CA: CPT

## 2024-04-10 PROCEDURE — 6370000000 HC RX 637 (ALT 250 FOR IP): Performed by: STUDENT IN AN ORGANIZED HEALTH CARE EDUCATION/TRAINING PROGRAM

## 2024-04-10 PROCEDURE — 85025 COMPLETE CBC W/AUTO DIFF WBC: CPT

## 2024-04-10 PROCEDURE — 1200000000 HC SEMI PRIVATE

## 2024-04-10 PROCEDURE — 2580000003 HC RX 258: Performed by: STUDENT IN AN ORGANIZED HEALTH CARE EDUCATION/TRAINING PROGRAM

## 2024-04-10 PROCEDURE — 2500000003 HC RX 250 WO HCPCS: Performed by: STUDENT IN AN ORGANIZED HEALTH CARE EDUCATION/TRAINING PROGRAM

## 2024-04-10 PROCEDURE — P9047 ALBUMIN (HUMAN), 25%, 50ML: HCPCS

## 2024-04-10 PROCEDURE — 83735 ASSAY OF MAGNESIUM: CPT

## 2024-04-10 PROCEDURE — 36415 COLL VENOUS BLD VENIPUNCTURE: CPT

## 2024-04-10 PROCEDURE — 6360000002 HC RX W HCPCS: Performed by: STUDENT IN AN ORGANIZED HEALTH CARE EDUCATION/TRAINING PROGRAM

## 2024-04-10 PROCEDURE — 6370000000 HC RX 637 (ALT 250 FOR IP): Performed by: INTERNAL MEDICINE

## 2024-04-10 RX ORDER — ALBUMIN (HUMAN) 12.5 G/50ML
12.5 SOLUTION INTRAVENOUS ONCE
Status: COMPLETED | OUTPATIENT
Start: 2024-04-10 | End: 2024-04-10

## 2024-04-10 RX ORDER — HYDROMORPHONE HYDROCHLORIDE 1 MG/ML
0.25 INJECTION, SOLUTION INTRAMUSCULAR; INTRAVENOUS; SUBCUTANEOUS ONCE
Status: DISCONTINUED | OUTPATIENT
Start: 2024-04-10 | End: 2024-04-10

## 2024-04-10 RX ORDER — ALBUMIN (HUMAN) 12.5 G/50ML
SOLUTION INTRAVENOUS
Status: COMPLETED
Start: 2024-04-10 | End: 2024-04-10

## 2024-04-10 RX ORDER — HEPARIN SODIUM 1000 [USP'U]/ML
INJECTION, SOLUTION INTRAVENOUS; SUBCUTANEOUS
Status: DISPENSED
Start: 2024-04-10 | End: 2024-04-10

## 2024-04-10 RX ADMIN — SACUBITRIL AND VALSARTAN 1 TABLET: 24; 26 TABLET, FILM COATED ORAL at 16:18

## 2024-04-10 RX ADMIN — EPOETIN ALFA-EPBX 3000 UNITS: 3000 INJECTION, SOLUTION INTRAVENOUS; SUBCUTANEOUS at 11:10

## 2024-04-10 RX ADMIN — SACUBITRIL AND VALSARTAN 1 TABLET: 24; 26 TABLET, FILM COATED ORAL at 21:03

## 2024-04-10 RX ADMIN — PRAVASTATIN SODIUM 40 MG: 40 TABLET ORAL at 08:26

## 2024-04-10 RX ADMIN — ALBUMIN (HUMAN) 12.5 G: 0.25 INJECTION, SOLUTION INTRAVENOUS at 11:08

## 2024-04-10 RX ADMIN — SODIUM CHLORIDE, PRESERVATIVE FREE 10 ML: 5 INJECTION INTRAVENOUS at 08:28

## 2024-04-10 RX ADMIN — BUSPIRONE HYDROCHLORIDE 10 MG: 5 TABLET ORAL at 13:56

## 2024-04-10 RX ADMIN — CALCIUM ACETATE 1334 MG: 667 CAPSULE ORAL at 08:28

## 2024-04-10 RX ADMIN — APIXABAN 5 MG: 5 TABLET, FILM COATED ORAL at 08:26

## 2024-04-10 RX ADMIN — INSULIN LISPRO 2 UNITS: 100 INJECTION, SOLUTION INTRAVENOUS; SUBCUTANEOUS at 08:26

## 2024-04-10 RX ADMIN — ALBUMIN (HUMAN) 12.5 G: 12.5 SOLUTION INTRAVENOUS at 10:17

## 2024-04-10 RX ADMIN — OXYCODONE AND ACETAMINOPHEN 1 TABLET: 7.5; 325 TABLET ORAL at 08:24

## 2024-04-10 RX ADMIN — DULOXETINE HYDROCHLORIDE 60 MG: 60 CAPSULE, DELAYED RELEASE ORAL at 08:24

## 2024-04-10 RX ADMIN — DOXYCYCLINE HYCLATE 100 MG: 100 TABLET, COATED ORAL at 08:25

## 2024-04-10 RX ADMIN — QUETIAPINE FUMARATE 50 MG: 25 TABLET ORAL at 21:03

## 2024-04-10 RX ADMIN — DOXYCYCLINE HYCLATE 100 MG: 100 TABLET, COATED ORAL at 21:03

## 2024-04-10 RX ADMIN — METOPROLOL SUCCINATE 25 MG: 25 TABLET, FILM COATED, EXTENDED RELEASE ORAL at 16:19

## 2024-04-10 RX ADMIN — INSULIN LISPRO 2 UNITS: 100 INJECTION, SOLUTION INTRAVENOUS; SUBCUTANEOUS at 17:25

## 2024-04-10 RX ADMIN — TRAZODONE HYDROCHLORIDE 50 MG: 50 TABLET ORAL at 21:04

## 2024-04-10 RX ADMIN — BUSPIRONE HYDROCHLORIDE 10 MG: 5 TABLET ORAL at 21:03

## 2024-04-10 RX ADMIN — APIXABAN 5 MG: 5 TABLET, FILM COATED ORAL at 21:03

## 2024-04-10 RX ADMIN — OXYCODONE AND ACETAMINOPHEN 1 TABLET: 7.5; 325 TABLET ORAL at 13:56

## 2024-04-10 RX ADMIN — CALCIUM ACETATE 1334 MG: 667 CAPSULE ORAL at 13:56

## 2024-04-10 RX ADMIN — CALCIUM ACETATE 1334 MG: 667 CAPSULE ORAL at 17:24

## 2024-04-10 RX ADMIN — ALBUMIN (HUMAN) 12.5 G: 0.25 INJECTION, SOLUTION INTRAVENOUS at 10:17

## 2024-04-10 RX ADMIN — HYDROMORPHONE HYDROCHLORIDE 0.5 MG: 1 INJECTION, SOLUTION INTRAMUSCULAR; INTRAVENOUS; SUBCUTANEOUS at 21:06

## 2024-04-10 RX ADMIN — OXYCODONE AND ACETAMINOPHEN 1 TABLET: 7.5; 325 TABLET ORAL at 17:54

## 2024-04-10 RX ADMIN — INSULIN LISPRO 2 UNITS: 100 INJECTION, SOLUTION INTRAVENOUS; SUBCUTANEOUS at 17:24

## 2024-04-10 RX ADMIN — BUSPIRONE HYDROCHLORIDE 10 MG: 5 TABLET ORAL at 08:26

## 2024-04-10 RX ADMIN — EPOETIN ALFA-EPBX 2000 UNITS: 2000 INJECTION, SOLUTION INTRAVENOUS; SUBCUTANEOUS at 11:09

## 2024-04-10 RX ADMIN — INSULIN LISPRO 2 UNITS: 100 INJECTION, SOLUTION INTRAVENOUS; SUBCUTANEOUS at 13:55

## 2024-04-10 RX ADMIN — INSULIN GLARGINE 15 UNITS: 100 INJECTION, SOLUTION SUBCUTANEOUS at 21:05

## 2024-04-10 ASSESSMENT — PAIN DESCRIPTION - DESCRIPTORS
DESCRIPTORS: ACHING

## 2024-04-10 ASSESSMENT — PAIN DESCRIPTION - LOCATION
LOCATION: LEG
LOCATION: FOOT
LOCATION: LEG;FOOT
LOCATION: FOOT

## 2024-04-10 ASSESSMENT — PAIN DESCRIPTION - ORIENTATION
ORIENTATION: RIGHT;LOWER
ORIENTATION: RIGHT

## 2024-04-10 ASSESSMENT — PAIN SCALES - GENERAL
PAINLEVEL_OUTOF10: 9
PAINLEVEL_OUTOF10: 7
PAINLEVEL_OUTOF10: 9
PAINLEVEL_OUTOF10: 8
PAINLEVEL_OUTOF10: 9
PAINLEVEL_OUTOF10: 7

## 2024-04-10 ASSESSMENT — PAIN DESCRIPTION - ONSET: ONSET: ON-GOING

## 2024-04-10 ASSESSMENT — PAIN DESCRIPTION - FREQUENCY: FREQUENCY: CONTINUOUS

## 2024-04-10 ASSESSMENT — PAIN DESCRIPTION - PAIN TYPE: TYPE: SURGICAL PAIN;CHRONIC PAIN

## 2024-04-10 ASSESSMENT — PAIN SCALES - WONG BAKER: WONGBAKER_NUMERICALRESPONSE: HURTS WHOLE LOT

## 2024-04-10 ASSESSMENT — PAIN - FUNCTIONAL ASSESSMENT: PAIN_FUNCTIONAL_ASSESSMENT: PREVENTS OR INTERFERES WITH ALL ACTIVE AND SOME PASSIVE ACTIVITIES

## 2024-04-10 NOTE — DISCHARGE INSTR - COC
Occupational Therapy  Weight Bearing Status/Restrictions: Non-weight bearing on right leg  Other Medical Equipment (for information only, NOT a DME order):  walker  Other Treatments:     Patient's personal belongings (please select all that are sent with patient):  All belongings sent with patient.    RN SIGNATURE:  Electronically signed by Regina Piña RN on 4/11/24 at 3:15 PM EDT    CASE MANAGEMENT/SOCIAL WORK SECTION    Inpatient Status Date: 3/27/24    Readmission Risk Assessment Score:  Readmission Risk              Risk of Unplanned Readmission:  90           Discharging to Facility/ [facility]     / signature: Electronically signed by BLAIR BENNETT RN on 4/11/24 at 2:09 PM EDT    PHYSICIAN SECTION    Prognosis: Fair    Condition at Discharge: Stable    Rehab Potential (if transferring to Rehab): Fair    Recommended Labs or Other Treatments After Discharge: Follow up with podiatry as outpatient, Follow up with vascular surgery as needed    Physician Certification: I certify the above information and transfer of Igor Ann  is necessary for the continuing treatment of the diagnosis listed and that he requires Skilled Nursing Facility for less 30 days.     Update Admission H&P: No change in H&P    PHYSICIAN SIGNATURE:  Electronically signed by Akash Lo MD on 4/11/24 at 1:46 PM EDT

## 2024-04-11 VITALS
SYSTOLIC BLOOD PRESSURE: 102 MMHG | HEART RATE: 66 BPM | DIASTOLIC BLOOD PRESSURE: 63 MMHG | WEIGHT: 217.37 LBS | HEIGHT: 69 IN | BODY MASS INDEX: 32.2 KG/M2 | RESPIRATION RATE: 16 BRPM | TEMPERATURE: 98 F | OXYGEN SATURATION: 99 %

## 2024-04-11 LAB
BASOPHILS # BLD: 0.1 K/UL (ref 0–0.2)
BASOPHILS NFR BLD: 1 %
DEPRECATED RDW RBC AUTO: 17.3 % (ref 12.4–15.4)
EOSINOPHIL # BLD: 0.8 K/UL (ref 0–0.6)
EOSINOPHIL NFR BLD: 6.8 %
GLUCOSE BLD-MCNC: 124 MG/DL (ref 70–99)
GLUCOSE BLD-MCNC: 127 MG/DL (ref 70–99)
HCT VFR BLD AUTO: 30.9 % (ref 40.5–52.5)
HGB BLD-MCNC: 10 G/DL (ref 13.5–17.5)
LYMPHOCYTES # BLD: 1 K/UL (ref 1–5.1)
LYMPHOCYTES NFR BLD: 8.9 %
MAGNESIUM SERPL-MCNC: 2.1 MG/DL (ref 1.8–2.4)
MCH RBC QN AUTO: 28.1 PG (ref 26–34)
MCHC RBC AUTO-ENTMCNC: 32.2 G/DL (ref 31–36)
MCV RBC AUTO: 87.1 FL (ref 80–100)
MONOCYTES # BLD: 1 K/UL (ref 0–1.3)
MONOCYTES NFR BLD: 8.2 %
NEUTROPHILS # BLD: 8.8 K/UL (ref 1.7–7.7)
NEUTROPHILS NFR BLD: 75.1 %
PERFORMED ON: ABNORMAL
PERFORMED ON: ABNORMAL
PLATELET # BLD AUTO: 266 K/UL (ref 135–450)
PLATELET BLD QL SMEAR: ADEQUATE
PMV BLD AUTO: 7.7 FL (ref 5–10.5)
RBC # BLD AUTO: 3.55 M/UL (ref 4.2–5.9)
SLIDE REVIEW: ABNORMAL
WBC # BLD AUTO: 11.7 K/UL (ref 4–11)

## 2024-04-11 PROCEDURE — 6360000002 HC RX W HCPCS: Performed by: STUDENT IN AN ORGANIZED HEALTH CARE EDUCATION/TRAINING PROGRAM

## 2024-04-11 PROCEDURE — 6370000000 HC RX 637 (ALT 250 FOR IP): Performed by: STUDENT IN AN ORGANIZED HEALTH CARE EDUCATION/TRAINING PROGRAM

## 2024-04-11 PROCEDURE — 85025 COMPLETE CBC W/AUTO DIFF WBC: CPT

## 2024-04-11 PROCEDURE — 2500000003 HC RX 250 WO HCPCS: Performed by: STUDENT IN AN ORGANIZED HEALTH CARE EDUCATION/TRAINING PROGRAM

## 2024-04-11 PROCEDURE — 83735 ASSAY OF MAGNESIUM: CPT

## 2024-04-11 PROCEDURE — 6370000000 HC RX 637 (ALT 250 FOR IP): Performed by: INTERNAL MEDICINE

## 2024-04-11 PROCEDURE — 2580000003 HC RX 258: Performed by: STUDENT IN AN ORGANIZED HEALTH CARE EDUCATION/TRAINING PROGRAM

## 2024-04-11 RX ORDER — DOXYCYCLINE HYCLATE 100 MG
100 TABLET ORAL EVERY 12 HOURS SCHEDULED
Qty: 10 TABLET | Refills: 0
Start: 2024-04-11 | End: 2024-04-16

## 2024-04-11 RX ORDER — SACCHAROMYCES BOULARDII 250 MG
250 CAPSULE ORAL 2 TIMES DAILY
Qty: 14 CAPSULE | Refills: 0
Start: 2024-04-11 | End: 2024-04-18

## 2024-04-11 RX ORDER — OXYCODONE AND ACETAMINOPHEN 7.5; 325 MG/1; MG/1
1 TABLET ORAL EVERY 12 HOURS PRN
Qty: 6 TABLET | Refills: 0 | Status: SHIPPED | OUTPATIENT
Start: 2024-04-11 | End: 2024-04-14

## 2024-04-11 RX ADMIN — DOXYCYCLINE HYCLATE 100 MG: 100 TABLET, COATED ORAL at 09:05

## 2024-04-11 RX ADMIN — CALCIUM ACETATE 1334 MG: 667 CAPSULE ORAL at 09:04

## 2024-04-11 RX ADMIN — DULOXETINE HYDROCHLORIDE 60 MG: 60 CAPSULE, DELAYED RELEASE ORAL at 09:04

## 2024-04-11 RX ADMIN — APIXABAN 5 MG: 5 TABLET, FILM COATED ORAL at 09:05

## 2024-04-11 RX ADMIN — OXYCODONE AND ACETAMINOPHEN 1 TABLET: 7.5; 325 TABLET ORAL at 09:07

## 2024-04-11 RX ADMIN — BUSPIRONE HYDROCHLORIDE 10 MG: 5 TABLET ORAL at 13:58

## 2024-04-11 RX ADMIN — HYDROMORPHONE HYDROCHLORIDE 0.5 MG: 1 INJECTION, SOLUTION INTRAMUSCULAR; INTRAVENOUS; SUBCUTANEOUS at 11:03

## 2024-04-11 RX ADMIN — METOPROLOL SUCCINATE 25 MG: 25 TABLET, FILM COATED, EXTENDED RELEASE ORAL at 09:05

## 2024-04-11 RX ADMIN — INSULIN LISPRO 2 UNITS: 100 INJECTION, SOLUTION INTRAVENOUS; SUBCUTANEOUS at 12:00

## 2024-04-11 RX ADMIN — SACUBITRIL AND VALSARTAN 1 TABLET: 24; 26 TABLET, FILM COATED ORAL at 09:04

## 2024-04-11 RX ADMIN — INSULIN LISPRO 2 UNITS: 100 INJECTION, SOLUTION INTRAVENOUS; SUBCUTANEOUS at 09:10

## 2024-04-11 RX ADMIN — BUSPIRONE HYDROCHLORIDE 10 MG: 5 TABLET ORAL at 09:05

## 2024-04-11 RX ADMIN — IPRATROPIUM BROMIDE AND ALBUTEROL SULFATE 1 DOSE: 2.5; .5 SOLUTION RESPIRATORY (INHALATION) at 10:22

## 2024-04-11 RX ADMIN — CALCIUM ACETATE 1334 MG: 667 CAPSULE ORAL at 12:00

## 2024-04-11 RX ADMIN — SODIUM CHLORIDE, PRESERVATIVE FREE 10 ML: 5 INJECTION INTRAVENOUS at 09:12

## 2024-04-11 RX ADMIN — PRAVASTATIN SODIUM 40 MG: 40 TABLET ORAL at 09:05

## 2024-04-11 ASSESSMENT — PAIN DESCRIPTION - DESCRIPTORS
DESCRIPTORS: ACHING

## 2024-04-11 ASSESSMENT — PAIN DESCRIPTION - LOCATION
LOCATION: FOOT;BACK
LOCATION: BACK;FOOT
LOCATION: BACK;FOOT
LOCATION: FOOT;BACK

## 2024-04-11 ASSESSMENT — PAIN SCALES - GENERAL
PAINLEVEL_OUTOF10: 7
PAINLEVEL_OUTOF10: 8
PAINLEVEL_OUTOF10: 10
PAINLEVEL_OUTOF10: 8

## 2024-04-11 ASSESSMENT — PAIN DESCRIPTION - ORIENTATION
ORIENTATION: RIGHT;LOWER
ORIENTATION: RIGHT;LOWER
ORIENTATION: RIGHT
ORIENTATION: RIGHT;LOWER

## 2024-04-11 NOTE — PROGRESS NOTES
Infectious Disease Follow up Notes    CC :  OM foot s/p TMA      Antibiotics:  Vanc by level      Admit Date:   3/27/2024  Hospital Day: 14    Subjective:   He remains AF   He c/o severe and persistent pain in the R foot  No N/V/D/itching     Objective:     Patient Vitals for the past 8 hrs:   BP Pulse Resp SpO2 Weight   04/09/24 1057 130/83 76 -- 94 % --   04/09/24 0800 (!) 141/85 72 18 98 % --   04/09/24 0608 -- -- -- -- 104.6 kg (230 lb 9.6 oz)       EXAM:  General:   alert, conversant, NAD, sitting on edge of bed    HEENT:   NCAT, PERRL, sclera anicteric   MMM, no thrush  NECK:   Supple      LUNGS:   non-labored breathing    ABD:   protuberant, soft, NT  BS present  EXT: Bulky dressing R foot   No focal rash          LINE:   PIV in place         Scheduled Meds:   vancomycin (VANCOCIN) intermittent dosing (placeholder)   Other RX Placeholder    insulin lispro  2 Units SubCUTAneous TID WC    epoetin siddharth-epbx  3,000 Units IntraVENous Once per day on Mon Wed Fri    And    epoetin siddharth-epbx  2,000 Units IntraVENous Once per day on Mon Wed Fri    calcium acetate  2 capsule Oral TID WC    apixaban  5 mg Oral BID    busPIRone  10 mg Oral TID    DULoxetine  60 mg Oral Daily    insulin glargine  15 Units SubCUTAneous Nightly    metoprolol succinate  25 mg Oral Daily    pravastatin  40 mg Oral Daily    QUEtiapine  50 mg Oral Nightly    sacubitril-valsartan  1 tablet Oral BID    traZODone  50 mg Oral Nightly    sodium chloride flush  5-40 mL IntraVENous 2 times per day    insulin lispro  0-8 Units SubCUTAneous TID WC    insulin lispro  0-4 Units SubCUTAneous Nightly       Continuous Infusions:   dextrose      sodium chloride            Data Review:    Lab Results   Component Value Date    WBC 12.1 (H) 04/08/2024    HGB 9.8 (L) 04/08/2024    HCT 30.6 (L) 04/08/2024    MCV 88.4 04/08/2024     04/08/2024     Lab Results   Component Value 
                               [facility] Nurse  Follow-up Progress Note       NAME:  Igor Ann  MEDICAL RECORD NUMBER:  [id number]  AGE:  53 y.o.   GENDER:  male  :  1970  TODAY'S DATE:  4/3/2024    Subjective: turn that bright light out!   Wound Identification:  Wound Type: diabetic  Contributing Factors: diabetes, poor glucose control, chronic pressure, decreased mobility, and shear force      Dr. Berg, Podiatry, debrided right lateral plantar foot ulcer.  Removed the hyperketosis areas.     Pt with stable ulcer to the dorsal left 2nd toe and ulceration to the plantar lateral right 4th MPJ that probes to bone. R foot MRI ordered to assess for osteo. Vascular on board, currently no plans for any intervention. Suspect pt will require R TMA this admission but await further advanced imaging.         Patient Goal of Care:  [x] Wound Healing  [] Odor Control  [] Palliative Care  [x] Pain Control   [] Other:     Objective: Returned from Dialysis. Lying in bed.    BP (!) 169/94   Pulse 85   Temp 97.6 °F (36.4 °C) (Oral)   Resp 20   Ht 1.753 m (5' 9\")   Wt 97.3 kg (214 lb 8.1 oz)   SpO2 92%   BMI 31.68 kg/m²   Isaac Risk Score: Isaac Scale Score: 19  Assessment:  Dry eschar minor to outer edges now.  Dry slough within wound.   Measurements:  Wound 24 Foot Right;Lateral eschar moist (Active)   Wound Image    24 1433   Wound Etiology Non-Healing Surgical 24 1433   Dressing Status New dressing applied 24 1433   Wound Cleansed Cleansed with saline 24 1433   Dressing/Treatment Alginate with Ag;Gauze dressing/dressing sponge 24 1433   Offloading for Diabetic Foot Ulcers Offloading ordered 24 1433   Dressing Change Due 24 1433   Wound Length (cm) 2.5 cm 24 1433   Wound Width (cm) 2.5 cm 24 1433   Wound Depth (cm) 0.2 cm 24 1433   Wound Surface Area (cm^2) 6.25 cm^2 24 1433   Change in Wound Size % (l*w) 
      Occupational Therapy    Chart reviewed, attempted to see pt for OT eval  this date;  pt refused d/t too fatigued.    Sangita Shine,OT  
    Nephrology Progress Note   [facility]      This patient is a 53 year old male whom we are following for ESKD.      Subjective:  The patient was seen and examined. Had HD yesterday with net UF of 3L, post weight was 93.5kg. Still complains of SOB. BC 1/2 positive.    Family History: No family at bedside  ROS: (+) diarrhea, no abdominal pain      Vitals:  /83   Pulse 74   Temp 97.5 °F (36.4 °C) (Oral)   Resp 20   Ht 1.753 m (5' 9\")   Wt 93.5 kg (206 lb 2.1 oz)   SpO2 100%   BMI 30.44 kg/m²   I/O last 3 completed shifts:  In: 1472 [P.O.:1222; IV Piggyback:250]  Out: -   No intake/output data recorded.      Physical Exam  Vitals reviewed.   Constitutional:       General: He is not in acute distress.  HENT:      Head: Normocephalic and atraumatic.   Eyes:      General: No scleral icterus.     Conjunctiva/sclera: Conjunctivae normal.   Cardiovascular:      Rate and Rhythm: Normal rate.      Heart sounds:      No friction rub.   Pulmonary:      Effort: Pulmonary effort is normal. No respiratory distress.      Breath sounds: Rales present. No wheezing.   Abdominal:      General: Bowel sounds are normal. There is no distension.      Tenderness: There is no abdominal tenderness.   Musculoskeletal:      Right lower leg: Edema present.      Left lower leg: Edema present.   Neurological:      Mental Status: He is alert.       Access: J TDC      Medications:   apixaban  5 mg Oral BID    busPIRone  10 mg Oral TID    calcium acetate  1 capsule Oral TID     DULoxetine  60 mg Oral Daily    insulin glargine  15 Units SubCUTAneous Nightly    metoprolol succinate  25 mg Oral Daily    pravastatin  40 mg Oral Daily    QUEtiapine  50 mg Oral Nightly    sacubitril-valsartan  1 tablet Oral BID    spironolactone  12.5 mg Oral Daily    traZODone  50 mg Oral Nightly    sodium chloride flush  5-40 mL IntraVENous 2 times per day    insulin lispro  0-8 Units SubCUTAneous TID     insulin lispro  0-4 Units SubCUTAneous 
    Nephrology Progress Note   [email or web address]      This patient is a 53 year old male whom we are following for ESKD.      Subjective:  The patient was seen and examined. Tolerated extra UF session yesterday with post weight of 91.1kg. BP on the low side now.     Family History: No family at bedside  ROS: tired and weak      Vitals:  BP 97/61   Pulse 79   Temp 97.4 °F (36.3 °C) (Oral)   Resp 22   Ht 1.753 m (5' 9\")   Wt 91.1 kg (200 lb 13.4 oz)   SpO2 100%   BMI 29.66 kg/m²   I/O last 3 completed shifts:  In: 1932 [P.O.:1932]  Out: 50 [Urine:50]  No intake/output data recorded.      Physical Exam  Vitals reviewed.   Constitutional:       General: He is not in acute distress.  HENT:      Head: Normocephalic and atraumatic.   Eyes:      General: No scleral icterus.     Conjunctiva/sclera: Conjunctivae normal.   Cardiovascular:      Rate and Rhythm: Normal rate.      Heart sounds:      No friction rub.   Pulmonary:      Effort: Pulmonary effort is normal. No respiratory distress.      Breath sounds: No wheezing.   Abdominal:      General: Bowel sounds are normal. There is no distension.      Tenderness: There is no abdominal tenderness.   Musculoskeletal:      Right lower leg: Edema present.      Left lower leg: Edema present.   Neurological:      Mental Status: He is alert.       Access: [facility] TDC      Medications:   calcium acetate  2 capsule Oral TID WC    ipratropium 0.5 mg-albuterol 2.5 mg  1 Dose Inhalation BID RT    apixaban  5 mg Oral BID    busPIRone  10 mg Oral TID    DULoxetine  60 mg Oral Daily    insulin glargine  15 Units SubCUTAneous Nightly    metoprolol succinate  25 mg Oral Daily    pravastatin  40 mg Oral Daily    QUEtiapine  50 mg Oral Nightly    sacubitril-valsartan  1 tablet Oral BID    spironolactone  12.5 mg Oral Daily    traZODone  50 mg Oral Nightly    sodium chloride flush  5-40 mL IntraVENous 2 times per day    insulin lispro  0-8 Units SubCUTAneous TID WC    insulin lispro  0-4 Units 
   03/27/24 0015   NIV Type   $NIV $Daily Charge   Equipment Type v60   Mode Bilevel   Mask Type Full face mask   Mask Size Medium   Assessment   Pulse (!) 158   Respirations (!) 38   BP (!) 206/193   SpO2 99 %   Comfort Level Good   Using Accessory Muscles Yes   Mask Compliance Good   Skin Assessment Clean, dry, & intact   Skin Protection for O2 Device N/A   Settings/Measurements   PIP Observed 26 cm H20   IPAP 18 cmH20   CPAP/EPAP 6 cmH2O   Vt (Measured) 716 mL   Rate Ordered 12   Insp Rise Time (%) 1 %   FiO2  60 %   I Time/ I Time % 0.9 s   Minute Volume (L/min) 24.5 Liters   Mask Leak (lpm) 38 lpm   Patient's Home Machine No   Alarm Settings   Alarms On Y   Low Pressure (cmH2O) 6 cmH2O   High Pressure (cmH2O) 30 cmH2O   Delay Alarm 20 sec(s)   RR Low (bpm) 6   RR High (bpm) 50 br/min       
   03/27/24 0501   NIV Type   NIV Started/Stopped On   Equipment Type v60   Mode Bilevel   Mask Type Full face mask   Mask Size Medium   Settings/Measurements   IPAP 18 cmH20   CPAP/EPAP 6 cmH2O   Vt (Measured) 610 mL   Rate Ordered 12   FiO2  (S)  50 %   Mask Leak (lpm) 42 lpm   Patient's Home Machine No   Alarm Settings   Alarms On Y   Low Pressure (cmH2O) 6 cmH2O   High Pressure (cmH2O) 30 cmH2O       
   03/28/24 0019   NIV Type   $NIV $Daily Charge   NIV Started/Stopped On   Equipment Type v60   Mode Bilevel   Mask Type Full face mask   Mask Size Medium   Assessment   Comfort Level Good   Using Accessory Muscles No   Mask Compliance Good   Skin Assessment Clean, dry, & intact   Skin Protection for O2 Device N/A   Settings/Measurements   PIP Observed 19 cm H20   IPAP 18 cmH20   CPAP/EPAP 6 cmH2O   Vt (Measured) 514 mL   Rate Ordered 12   Insp Rise Time (%) 1 %   FiO2  50 %   I Time/ I Time % 0.9 s   Minute Volume (L/min) 12.7 Liters   Mask Leak (lpm) 43 lpm   Patient's Home Machine No   Alarm Settings   Alarms On Y   Low Pressure (cmH2O) 6 cmH2O   High Pressure (cmH2O) 30 cmH2O   Delay Alarm 20 sec(s)   RR Low (bpm) 6   RR High (bpm) 50 br/min       
   03/28/24 0352   NIV Type   $NIV $Daily Charge   NIV Started/Stopped On   Equipment Type v60   Mode Bilevel   Mask Type Full face mask   Mask Size Medium   Settings/Measurements   PIP Observed 19 cm H20   IPAP 18 cmH20   CPAP/EPAP 6 cmH2O   Vt (Measured) 652 mL   Rate Ordered 12   Insp Rise Time (%) 1 %   FiO2  50 %   I Time/ I Time % 0.9 s   Minute Volume (L/min) 11.8 Liters   Mask Leak (lpm) 57 lpm   Patient's Home Machine No   Alarm Settings   Alarms On Y   Low Pressure (cmH2O) 6 cmH2O   High Pressure (cmH2O) 30 cmH2O   Delay Alarm 20 sec(s)   RR Low (bpm) 6   RR High (bpm) 50 br/min       
   03/28/24 0812   NIV Type   NIV Started/Stopped On   Equipment Type V60   Mode Bilevel   Mask Type Full face mask   Mask Size Medium   Settings/Measurements   PIP Observed 19 cm H20   IPAP 18 cmH20   CPAP/EPAP 6 cmH2O   Vt (Measured) 534 mL   Rate Ordered 12   Insp Rise Time (%) 1 %   FiO2  50 %   I Time/ I Time % 0.9 s   Minute Volume (L/min) 8.8 Liters   Mask Leak (lpm) 29 lpm   Patient's Home Machine No       
   03/28/24 2240   NIV Type   Equipment Type v60   Mode Bilevel   Mask Type Full face mask   Mask Size Medium   Assessment   Respirations 24   SpO2 98 %   Comfort Level Good   Using Accessory Muscles No   Mask Compliance Good   Skin Assessment Clean, dry, & intact   Skin Protection for O2 Device No  (pt does not want mepilex)   Settings/Measurements   PIP Observed 19 cm H20   IPAP 18 cmH20   CPAP/EPAP 6 cmH2O   Vt (Measured) 477 mL   Rate Ordered 12   FiO2  50 %  (decreased to 45%)   I Time/ I Time % 0.9 s   Minute Volume (L/min) 15 Liters   Mask Leak (lpm) 44 lpm   Patient's Home Machine No   Alarm Settings   Alarms On Y   Low Pressure (cmH2O) 6 cmH2O   High Pressure (cmH2O) 30 cmH2O   Apnea (secs) 20 secs   RR Low (bpm) 6   RR High (bpm) 40 br/min       
   03/29/24 0026   NIV Type   $NIV $Daily Charge   Equipment Type v60   Mode Bilevel   Mask Type Full face mask   Mask Size Medium   Assessment   Respirations (!) 32   SpO2 98 %   Comfort Level Good   Using Accessory Muscles No   Mask Compliance Good   Skin Protection for O2 Device No  (pt refusing)   Settings/Measurements   PIP Observed 20 cm H20   IPAP 18 cmH20   CPAP/EPAP 6 cmH2O   Vt (Measured) 456 mL   Rate Ordered 12   FiO2  45 %   I Time/ I Time % 0.9 s   Minute Volume (L/min) 14.6 Liters   Mask Leak (lpm) 26 lpm   Patient's Home Machine No   Alarm Settings   Alarms On Y   Low Pressure (cmH2O) 6 cmH2O   High Pressure (cmH2O) 30 cmH2O   Apnea (secs) 20 secs   RR Low (bpm) 6   RR High (bpm) 45 br/min       
   03/29/24 0405   NIV Type   Equipment Type v60   Mode Bilevel   Mask Type Full face mask   Mask Size Medium   Assessment   Respirations 27   SpO2 97 %   Comfort Level Good   Using Accessory Muscles No   Mask Compliance Good   Skin Protection for O2 Device No  (pt does not want mepilex)   Settings/Measurements   PIP Observed 19 cm H20   IPAP 18 cmH20   CPAP/EPAP 6 cmH2O   Vt (Measured) 441 mL   Rate Ordered 12   FiO2  45 %   I Time/ I Time % 1 s   Minute Volume (L/min) 10.8 Liters   Mask Leak (lpm) 20 lpm   Patient's Home Machine No   Alarm Settings   Alarms On Y   Low Pressure (cmH2O) 6 cmH2O   High Pressure (cmH2O) 30 cmH2O   Apnea (secs) 20 secs   RR Low (bpm) 6   RR High (bpm) 45 br/min       
   03/29/24 0748   NIV Type   Equipment Type v60   Mode Bilevel   Mask Type Full face mask   Mask Size Medium   Settings/Measurements   PIP Observed 19 cm H20   IPAP 18 cmH20   CPAP/EPAP 6 cmH2O   Vt (Set, mL) 18 mL   Vt (Measured) 569 mL   Rate Ordered 10.5   Insp Rise Time (%) 1 %   FiO2  45 %   I Time/ I Time % 1 s   Minute Volume (L/min) 10.4 Liters   Mask Leak (lpm) 37 lpm   Patient's Home Machine No       
   03/30/24 0001   NIV Type   NIV Started/Stopped On   Equipment Type v60   Mode Bilevel   Mask Type Full face mask   Mask Size Medium   Assessment   Respirations 22   Comfort Level Good   Using Accessory Muscles No   Mask Compliance Good   Skin Assessment Clean, dry, & intact   Skin Protection for O2 Device Yes   Orientation Middle   Location Nose   Settings/Measurements   IPAP 18 cmH20   CPAP/EPAP 6 cmH2O   Vt (Measured) 347 mL   Rate Ordered 12   FiO2  45 %   Minute Volume (L/min) 7.1 Liters   Mask Leak (lpm) 47 lpm   Patient's Home Machine No   Alarm Settings   Alarms On Y   Low Pressure (cmH2O) 6 cmH2O   High Pressure (cmH2O) 30 cmH2O   Delay Alarm 20 sec(s)   RR Low (bpm) 6   RR High (bpm) 45 br/min       
   03/30/24 0410   NIV Type   Equipment Type v60   Mode Bilevel   Mask Type Full face mask   Mask Size Medium   Assessment   Respirations 27   Comfort Level Good   Using Accessory Muscles No   Mask Compliance Good   Settings/Measurements   IPAP 18 cmH20   CPAP/EPAP 6 cmH2O   Vt (Measured) 436 mL   Rate Ordered 12   FiO2  45 %   Minute Volume (L/min) 11.5 Liters   Mask Leak (lpm) 11 lpm   Patient's Home Machine No   Alarm Settings   Alarms On Y   Low Pressure (cmH2O) 6 cmH2O   High Pressure (cmH2O) 30 cmH2O   Delay Alarm 20 sec(s)   RR Low (bpm) 6   RR High (bpm) 45 br/min       
   03/30/24 2033   NIV Type   NIV Started/Stopped On   Equipment Type v60   Mode Bilevel   Mask Type Full face mask   Mask Size Medium   Assessment   Pulse 82   Heart Rate Source Monitor   Respirations 16   SpO2 98 %   Skin Protection for O2 Device Yes   Location Nose   Breath Sounds   Breath Sounds Bilateral Diminished   Settings/Measurements   PIP Observed 18 cm H20   IPAP 18 cmH20   CPAP/EPAP 6 cmH2O   Vt (Measured) 444 mL   Rate Ordered 12   Insp Rise Time (%) 1 %   FiO2  45 %   I Time/ I Time % 1 s   Minute Volume (L/min) 10 Liters   Mask Leak (lpm) 33 lpm   Patient's Home Machine No   Alarm Settings   Alarms On Y   Low Pressure (cmH2O) 6 cmH2O   High Pressure (cmH2O) 30 cmH2O   Delay Alarm 20 sec(s)   Apnea (secs) 20 secs   RR Low (bpm) 6   RR High (bpm) 45 br/min   Oxygen Therapy/Pulse Ox   O2 Therapy Oxygen   O2 Device PAP (positive airway pressure)       
   03/31/24 0014   NIV Type   Equipment Type v60   Mode Bilevel   Mask Type Full face mask   Mask Size Medium   Assessment   Respirations 17   SpO2 99 %   Comfort Level Good   Using Accessory Muscles No   Mask Compliance Good   Skin Protection for O2 Device Yes   Orientation Middle   Location Nose   Settings/Measurements   IPAP 18 cmH20   CPAP/EPAP 6 cmH2O   Vt (Measured) 467 mL   Rate Ordered 12   FiO2  45 %   Minute Volume (L/min) 8 Liters   Mask Leak (lpm) 40 lpm   Patient's Home Machine No   Alarm Settings   Alarms On Y   Low Pressure (cmH2O) 6 cmH2O   High Pressure (cmH2O) 30 cmH2O   Delay Alarm 20 sec(s)   RR Low (bpm) 6   RR High (bpm) 45 br/min       
   03/31/24 0343   NIV Type   Equipment Type v60   Mode Bilevel   Mask Type Full face mask   Assessment   Pulse 66   Respirations 14   SpO2 100 %   Comfort Level Good   Using Accessory Muscles No   Mask Compliance Good   Skin Assessment Clean, dry, & intact   Skin Protection for O2 Device Yes   Location Nose   Settings/Measurements   PIP Observed 19 cm H20   IPAP 18 cmH20   CPAP/EPAP 6 cmH2O   Vt (Measured) 534 mL   Rate Ordered 12   Insp Rise Time (%) 1 %   FiO2  45 %   I Time/ I Time % 1 s   Minute Volume (L/min) 7.2 Liters   Mask Leak (lpm) 23 lpm   Patient's Home Machine No   Alarm Settings   Alarms On Y   Low Pressure (cmH2O) 6 cmH2O   High Pressure (cmH2O) 30 cmH2O   Delay Alarm 20 sec(s)   RR Low (bpm) 6   RR High (bpm) 45 br/min       
   03/31/24 2138   NIV Type   NIV Started/Stopped On   Equipment Type v60   Mode Bilevel   Mask Type Full face mask   Assessment   Pulse 78   Respirations (!) 33  (pt was talking)   SpO2 100 %   Comfort Level Good   Using Accessory Muscles No   Mask Compliance Good   Skin Assessment Clean, dry, & intact   Skin Protection for O2 Device No  (pt refuses mepilex)   Settings/Measurements   PIP Observed 21 cm H20   IPAP 18 cmH20   CPAP/EPAP 6 cmH2O   Vt (Measured) 469 mL   Rate Ordered 12   Insp Rise Time (%) 1 %   FiO2  40 %   I Time/ I Time % 1 s   Minute Volume (L/min) 15.5 Liters   Mask Leak (lpm) 27 lpm   Patient's Home Machine No   Alarm Settings   Alarms On Y   Low Pressure (cmH2O) 6 cmH2O   High Pressure (cmH2O) 30 cmH2O   Delay Alarm 20 sec(s)   RR Low (bpm) 6   RR High (bpm) 45 br/min       
   04/01/24 7535   NIV Type   NIV Started/Stopped Off  (pt states that he does not want to wear BIPAP tonight. pt resting comfortably on NC)       
   04/02/24 0922   RT Protocol   History Pulmonary Disease 1   Respiratory pattern 0   Breath sounds 2   Cough 0   Indications for Bronchodilator Therapy None   Bronchodilator Assessment Score 3       
   04/02/24 1909   RT Protocol   History Pulmonary Disease 1   Respiratory pattern 0   Breath sounds 2   Cough 0   Indications for Bronchodilator Therapy On home bronchodilators   Bronchodilator Assessment Score 3       
  Hospital Medicine Progress Note      Date of Admission: 3/27/2024  Hospital Day: 10      Chief Admission Complaint:  sob     Subjective:       Patient seen in dialysis today.  Does endorse throbbing and pain of his surgical repair foot.  I discussed with the patient and alter his pain regimen today with oral and IV to try to get out of the pain for him.  Otherwise tolerating dialysis well and has no other complaints at this time.      Presenting Admission History:          53 y.o. male with esrd (on hd mwf), cad, cardiomyopathy, gerd, htn, copd/chronic resp failure(4L) who presented to [facility] with worsening sob.  Pt was resting in bed and managed to go to sleep. Shortly after he woke up , 'gasping for air'  and sob. He felt 'like he was going to die'.  Pt states he has been compliant with HD but not with medications (as he is overwhelmed with what he has to take and does not have them well organized at home). EMS was called and noted pt to be sob and hypoxic at home     Assessment/Plan:       Current Principal Problem:  Acute on chronic systolic CHF (congestive heart failure) (HCC)    Acute on chronic resp failure- possibly from flash pulm edema from uncontrolled htn. Covid/flu neg  -was on intermittent bipap  -tele  -supp oxygen, weaned as tolerated  -started on empriric abx  in ER, procal slightly elevated(possibly from flash pulm edema), did not continue addit abx at this time  -trended zhao   -repeat echo done 4/1(ef 35%, mild lvh, hk of inferior wall, mild mr), improved from 3/023 echo(ef 25%)       Rt foot wound  -podiatry consulted: [facility] on 4/4/24  -Not thought to be acutely infected  -Pain control: Oral percocet + IV Dilaudid   -Postop care    Abn bcx- -bcx x 1 noted staph epi, likely contaminant  -repeat bcx 3/28 are ngtd     Esrd- on hd  -nephro consulted, apprec mgmt     Cardiomyopathy- stable  -continued home bb, entresto, aldactone     CAD-without complaints of cp  -on statin/plavix/bb, imdur   
  Hospital Medicine Progress Note      Date of Admission: 3/27/2024  Hospital Day: 14    Chief Admission Complaint:  sob     Subjective:  resting in bed, notes poor sleep due to being light sleeper, no complaints, eating ok, aware of placement to facility     Presenting Admission History:            53 y.o. male with esrd (on hd mwf), cad, cardiomyopathy, gerd, htn, copd/chronic resp failure(4L) who presented to [facility] with worsening sob.  Pt was resting in bed and managed to go to sleep. Shortly after he woke up , 'gasping for air'  and sob. He felt 'like he was going to die'.  Pt states he has been compliant with HD but not with medications (as he is overwhelmed with what he has to take and does not have them well organized at home). EMS was called and noted pt to be sob and hypoxic at home      Assessment/Plan:       Current Principal Problem:  Acute on chronic systolic CHF (congestive heart failure) (HCC)       Acute on chronic resp failure- possibly from flash pulm edema from uncontrolled htn. Covid/flu neg  -was on intermittent bipap  -tele  -supp oxygen, weaned as tolerated  -started on empriric abx  in ER, procal slightly elevated(possibly from flash pulm edema), did not continue addit abx at this time  -trended zhao   -repeat echo done 4/1(ef 35%, mild lvh, hk of inferior wall, mild mr), improved from 3/023 echo(ef 25%)        Rt foot wound- gangrene  -podiatry consulted: OR on 4/4/24(s/p TMA)  -Surgical culture was growing MRSA  -IV vancomycin and ID consult placed  -Pain control: Oral percocet + IV Dilaudid   -Postop care     Abn bcx- -bcx x 1 noted staph epi, likely contaminant  -repeat bcx 3/28 are ngtd     Esrd- on hd  -nephro consulted, apprec mgmt     Cardiomyopathy- stable  -continued home bb, entresto, aldactone     CAD-without complaints of cp  -on statin/plavix/bb, imdur     Rt le pain- checked u/s- noted incidental rt mid post tib artery occlusion  -vasc surg consulted, rt leg arterial 
  Hospital Medicine Progress Note      Date of Admission: 3/27/2024  Hospital Day: 15    Chief Admission Complaint:  sob     Subjective:  resting in bed, seen after HD, bp was low today, required albumin, no complaints, eating ok, reminded again of placement to facility     Presenting Admission History:            53 y.o. male with esrd (on hd mwf), cad, cardiomyopathy, gerd, htn, copd/chronic resp failure(4L) who presented to [facility] with worsening sob.  Pt was resting in bed and managed to go to sleep. Shortly after he woke up , 'gasping for air'  and sob. He felt 'like he was going to die'.  Pt states he has been compliant with HD but not with medications (as he is overwhelmed with what he has to take and does not have them well organized at home). EMS was called and noted pt to be sob and hypoxic at home      Assessment/Plan:       Current Principal Problem:  Acute on chronic systolic CHF (congestive heart failure) (HCC)       Acute on chronic resp failure- possibly from flash pulm edema from uncontrolled htn. Covid/flu neg  -was on intermittent bipap  -tele  -supp oxygen, weaned as tolerated  -started on empriric abx  in ER, procal slightly elevated(possibly from flash pulm edema), did not continue addit abx at this time  -trended zhao   -repeat echo done 4/1(ef 35%, mild lvh, hk of inferior wall, mild mr), improved from 3/023 echo(ef 25%)        Rt foot wound- gangrene  -podiatry consulted: OR on 4/4/24(s/p TMA)  -Surgical culture was growing MRSA  -IV vancomycin and ID consult placed, rec po abx on dc as path was neg for residual OM  -Pain control: Oral percocet + IV Dilaudid   -Postop care     Abn bcx- -bcx x 1 noted staph epi, likely contaminant  -repeat bcx 3/28 are ngtd     Esrd- on hd  -nephro consulted, apprec mgmt     Cardiomyopathy- stable  -continued home bb, entresto, aldactone     CAD-without complaints of cp  -on statin/plavix/bb, imdur     Rt le pain- checked u/s- noted incidental rt mid 
  Hospital Medicine Progress Note      Date of Admission: 3/27/2024  Hospital Day: 2    Chief Admission Complaint:  sob     Subjective:  resting in bed, on bipap, no complaints, aware of HD again today    Presenting Admission History:         53 y.o. male with esrd (on hd mwf), cad, cardiomyopathy, gerd, htn, copd/chronic resp failure(4L) who presented to [facility] with worsening sob.  Pt was resting in bed and managed to go to sleep. Shortly after he woke up , 'gasping for air'  and sob. He felt 'like he was going to die'.  Pt states he has been compliant with HD but not with medications (as he is overwhelmed with what he has to take and does not have them well organized at home). EMS was called and noted pt to be sob and hypoxic at home  - today he notes chest tightness and is asking for a breathing tx  -No f/chils/cough/n/v/diarrhea of late    Assessment/Plan:      Current Principal Problem:  Acute on chronic systolic CHF (congestive heart failure) (HCC)    Acute on chronic resp failure- possibly from flash pulm edema from uncontrolled htn. Covid/flu neg  -was on intermittent bipap  -tele  -supp oxygen, weaned as tolerated  -started on empriric abx  in ER, procal slightly elevated(possibly from flash pulm edema), did not continue addit abx at this time  -trended zhao    Abn bcx- -bcx x 1 noted staph epi, likely contaminant  -repeat bcx 3/28    Esrd- on hd  -nephro consulted, apprec mgmt     CAD-without complaints of cp  -on statin/plavix/bb, imdur     Rt le pain- check us, noted incidental rt mid post tib artery occlusion  -vasc surg consulted     COPD/chronic resp failure- on 4L at home  -on prn inhalers     Hx of VTE-per emr, pt has not been compliant  -on eliquis  -check LE u/s given rt leg pains     Hx of CVA- stable  -on plavix and statin     Depression/anxiety- defer to outpt mmgt  -continued buspar and cymbalta     DM2- controlled, last A1c 6.9  -ac/hs bs  -lantus qhs  -ssi medium     ZAINAB- defer to 
  Hospital Medicine Progress Note      Date of Admission: 3/27/2024  Hospital Day: 3      Chief Admission Complaint:  sob     Subjective:  resting in bed, currently off bipap, getting HD today, notes dizziness and nausea, does feel improved from admission    Presenting Admission History:          53 y.o. male with esrd (on hd mwf), cad, cardiomyopathy, gerd, htn, copd/chronic resp failure(4L) who presented to [facility] with worsening sob.  Pt was resting in bed and managed to go to sleep. Shortly after he woke up , 'gasping for air'  and sob. He felt 'like he was going to die'.  Pt states he has been compliant with HD but not with medications (as he is overwhelmed with what he has to take and does not have them well organized at home). EMS was called and noted pt to be sob and hypoxic at home  - today he notes chest tightness and is asking for a breathing tx  -No f/chils/cough/n/v/diarrhea of late     Assessment/Plan:       Current Principal Problem:  Acute on chronic systolic CHF (congestive heart failure) (HCC)    Acute on chronic resp failure- possibly from flash pulm edema from uncontrolled htn. Covid/flu neg  -was on intermittent bipap  -tele  -supp oxygen, weaned as tolerated  -started on empriric abx  in ER, procal slightly elevated(possibly from flash pulm edema), did not continue addit abx at this time  -trended zhao   -repeat echo    Abn bcx- -bcx x 1 noted staph epi, likely contaminant  -repeat bcx 3/28     Esrd- on hd  -nephro consulted, apprec mgmt     CAD-without complaints of cp  -on statin/plavix/bb, imdur     Rt le pain- check us, noted incidental rt mid post tib artery occlusion  -vasc surg consulted     COPD/chronic resp failure- on 4L at home  -on prn inhalers     Hx of VTE-per emr, pt has not been compliant so unlikely to be failure of AC  -on eliquis  -checked LE u/s given rt leg pains, noted post tib artery occlusion     Hx of CVA- stable  -on plavix and statin     Depression/anxiety- 
  Hospital Medicine Progress Note      Date of Admission: 3/27/2024  Hospital Day: 5      Chief Admission Complaint:  sob     Subjective:  resting in bed, currently off bipap, not getting HD today, notes dizziness and nausea resolved, feels much improved today, appetite much improved     Presenting Admission History:          53 y.o. male with esrd (on hd mwf), cad, cardiomyopathy, gerd, htn, copd/chronic resp failure(4L) who presented to [facility] with worsening sob.  Pt was resting in bed and managed to go to sleep. Shortly after he woke up , 'gasping for air'  and sob. He felt 'like he was going to die'.  Pt states he has been compliant with HD but not with medications (as he is overwhelmed with what he has to take and does not have them well organized at home). EMS was called and noted pt to be sob and hypoxic at home  - today he notes chest tightness and is asking for a breathing tx  -No f/chils/cough/n/v/diarrhea of late     Assessment/Plan:       Current Principal Problem:  Acute on chronic systolic CHF (congestive heart failure) (HCC)    Acute on chronic resp failure- possibly from flash pulm edema from uncontrolled htn. Covid/flu neg  -was on intermittent bipap  -tele  -supp oxygen, weaned as tolerated  -started on empriric abx  in ER, procal slightly elevated(possibly from flash pulm edema), did not continue addit abx at this time  -trended zhao   -repeat echo pending     Abn bcx- -bcx x 1 noted staph epi, likely contaminant  -repeat bcx 3/28 are ngtd     Esrd- on hd  -nephro consulted, apprec mgmt     CAD-without complaints of cp  -on statin/plavix/bb, imdur     Rt le pain- check us, noted incidental rt mid post tib artery occlusion  -vasc surg consulted, rt leg arterial duplex to be ordered and possibly angiogram     COPD/chronic resp failure- on 4L at home  -on prn inhalers     Hx of VTE-per emr, pt has not been compliant so unlikely to be failure of AC  -on eliquis  -checked LE u/s given rt leg 
  Hospital Medicine Progress Note      Date of Admission: 3/27/2024  Hospital Day: 7    Chief Admission Complaint:  sob     Subjective:  resting in bedside, currently off bipap, not sure about getting HD today, feels much improved from admission, appetite good,  nervous about being discharged     Presenting Admission History:          53 y.o. male with esrd (on hd mwf), cad, cardiomyopathy, gerd, htn, copd/chronic resp failure(4L) who [facility] with worsening sob.  Pt was resting in bed and managed to go to sleep. Shortly after he woke up , 'gasping for air'  and sob. He felt 'like he was going to die'.  Pt states he has been compliant with HD but not with medications (as he is overwhelmed with what he has to take and does not have them well organized at home). EMS was called and noted pt to be sob and hypoxic at home  - today he notes chest tightness and is asking for a breathing tx  -No f/chils/cough/n/v/diarrhea of late     Assessment/Plan:       Current Principal Problem:  Acute on chronic systolic CHF (congestive heart failure) (HCC)    Acute on chronic resp failure- possibly from flash pulm edema from uncontrolled htn. Covid/flu neg  -was on intermittent bipap  -tele  -supp oxygen, weaned as tolerated  -started on empriric abx  in ER, procal slightly elevated(possibly from flash pulm edema), did not continue addit abx at this time  -trended zhao   -repeat echo done 4/1(ef 35%, mild lvh, hk of inferior wall, mild mr), improved from 3/023 echo(ef 25%)     Abn bcx- -bcx x 1 noted staph epi, likely contaminant  -repeat bcx 3/28 are ngtd     Esrd- on hd  -nephro consulted, apprec mgmt     Cardiomyopathy- stable  -continued home bb, entresto, aldactone     CAD-without complaints of cp  -on statin/plavix/bb, imdur     Rt le pain- check us, noted incidental rt mid post tib artery occlusion  -vasc surg consulted, rt leg arterial duplex ordered and possibly angiogram     COPD/chronic resp failure- on 4L 
  Hospital Medicine Progress Note      Date of Admission: 3/27/2024  Hospital Day: 9      Chief Admission Complaint:  sob     Subjective:     Patient with no acute complaints today.  Plan is for surgery with podiatry later in the day.       Presenting Admission History:          53 y.o. male with esrd (on hd mwf), cad, cardiomyopathy, gerd, htn, copd/chronic resp failure(4L) who [facility].  Pt was resting in bed and managed to go to sleep. Shortly after he woke up , 'gasping for air'  and sob. He felt 'like he was going to die'.  Pt states he has been compliant with HD but not with medications (as he is overwhelmed with what he has to take and does not have them well organized at home). EMS was called and noted pt to be sob and hypoxic at home     Assessment/Plan:       Current Principal Problem:  Acute on chronic systolic CHF (congestive heart failure) (HCC)    Acute on chronic resp failure- possibly from flash pulm edema from uncontrolled htn. Covid/flu neg  -was on intermittent bipap  -tele  -supp oxygen, weaned as tolerated  -started on empriric abx  in ER, procal slightly elevated(possibly from flash pulm edema), did not continue addit abx at this time  -trended zhao   -repeat echo done 4/1(ef 35%, mild lvh, hk of inferior wall, mild mr), improved from 3/023 echo(ef 25%)       Rt foot wound  -podiatry consulted: [facility] on 4/4/24  -Not thought to be acutely infected  -Pain control  -Postop care    Abn bcx- -bcx x 1 noted staph epi, likely contaminant  -repeat bcx 3/28 are ngtd     Esrd- on hd  -nephro consulted, apprec mgmt     Cardiomyopathy- stable  -continued home bb, entresto, aldactone     CAD-without complaints of cp  -on statin/plavix/bb, imdur     Rt le pain- checked u/s- noted incidental rt mid post tib artery occlusion  -vasc surg consulted, rt leg arterial duplex ordered   -see below     COPD/chronic resp failure- on 4L at home  -on prn inhalers     Hx of VTE-per emr, 
  [facility] Progress Note           Subjective/   53 y.o. year old male who we are seeing in consultation for ESKD on HD MWF.     HPI:  HD on 4/1 later today  HD on 3/29 with 1 liter removed, post-weight of 91.5 kg, c/b hypotension requiring albumin.  States shortness of breath better, on 4 L NC which he uses at home.    ROS:  +weak, intake adequate, no fevers.    Objective/   GEN:  Chronically ill, /70   Pulse 74   Temp 97.5 °F (36.4 °C) (Oral)   Resp 18   Ht 1.753 m (5' 9\")   Wt 95.9 kg (211 lb 6.4 oz)   SpO2 100%   BMI 31.22 kg/m²   HEENT: non-icteric, no JVD  CV: S1, S2 without m/r/g; no LE edema  RESP: CTA B without w/r/r; breathing wnl  ABD: +bs, soft, nt, no hsm  SKIN: warm, no rashes  ACCESS: Left IJ TDC    Data/  Recent Labs     03/30/24  0731   WBC 9.2   HGB 10.5*   HCT 32.9*   MCV 88.8          Recent Labs     03/30/24  0731 03/31/24  0700   * 131*   K 4.7 5.0   CL 92* 91*   CO2 26 26   GLUCOSE 118* 103*   MG 2.10 2.00   BUN 41* 60*   CREATININE 5.0* 6.7*   LABGLOM 13* 9*         Assessment/Plan     ESKD.  - On HD MWF at [facility].  - Access: Left IJ TDC.  - Outpatient TW 94kg, challenging as tolerated, now higher at ~91.5 kg with hypotension with last HD.     Hyperkalemia.  - Specimen hemolyzed with repeat wnl.  - Renal diet.  - Will adjust K+ bath in HD accordingly.     Anemia.  - Hgb is 10.5 g/dL.  - He gets Mircera 100mcg every 2 weeks and Venofer 50mg qweekly.     Hypertensive Urgency.  - On Metoprolol, Valsartan and Torsemide.  - Improving with fluid removal.     Pulmonary Edema.  - Improving with dialysis.  - Fluid and sodium restriction.     CKD-MBD.  - Continue calcium acetate.  - Renal diet.     Positive BC.  - 1/2 bottles, Staph epi suspect a contaminant.  - Repeat BC on 3/28/24 is NGTD (obtained prior to receiving any antibiotic).  - WBC mildly elevated, now wnl. No fever.  - Received Vancomycin after HD on 3/28, 3/29. If repeat BC is 
  [facility] Progress Note           Subjective/   53 y.o. year old male who we are seeing in consultation for ESKD on HD MWF.     HPI:  Last had HD on 3/29 with 1 liter removed, post-weight of 91.5 kg, c/b hypotension requiring albumin.  States shortness of breath better, on 4 L NC which he uses at home.    ROS:  +weak, intake adequate, no fevers.    Objective/   GEN:  Chronically ill, /84   Pulse 86   Temp 97.4 °F (36.3 °C) (Oral)   Resp 16   Ht 1.753 m (5' 9\")   Wt 93.3 kg (205 lb 11 oz)   SpO2 94%   BMI 30.38 kg/m²   HEENT: non-icteric, no JVD  CV: S1, S2 without m/r/g; no LE edema  RESP: CTA B without w/r/r; breathing wnl  ABD: +bs, soft, nt, no hsm  SKIN: warm, no rashes  ACCESS: Left IJ TDC    Data/  Recent Labs     03/29/24  0538 03/30/24  0731   WBC 11.4* 9.2   HGB 10.9* 10.5*   HCT 34.5* 32.9*   MCV 88.7 88.8    282       Recent Labs     03/29/24  0538 03/30/24  0731 03/31/24  0700   * 131* 131*   K 5.1 4.7 5.0   CL 89* 92* 91*   CO2 25 26 26   GLUCOSE 233* 118* 103*   MG 1.90 2.10 2.00   BUN 50* 41* 60*   CREATININE 5.8* 5.0* 6.7*   LABGLOM 11* 13* 9*         Assessment/Plan     ESKD.  - On HD MWF at [facility].  - Access: Left IJ TDC.  - Outpatient TW 94kg, challenging as tolerated, now higher at ~91.5 kg with hypotension with last HD.     Hyperkalemia.  - Specimen hemolyzed with repeat wnl.  - Renal diet.  - Will adjust K+ bath in HD accordingly.     Anemia.  - Hgb is 10.5 g/dL.  - He gets Mircera 100mcg every 2 weeks and Venofer 50mg qweekly.     Hypertensive Urgency.  - On Metoprolol, Valsartan and Torsemide.  - Improving with fluid removal.     Pulmonary Edema.  - Improving with dialysis.  - Fluid and sodium restriction.     CKD-MBD.  - Continue calcium acetate.  - Renal diet.     Positive BC.  - 1/2 bottles, Staph epi suspect a contaminant.  - Repeat BC on 3/28/24 is pending (obtained prior to receiving any antibiotic).  - WBC mildly elevated, 
  [facility] Progress Note           Subjective/   53 y.o. year old male who we are seeing in consultation for ESKD on HD MWF.     HPI:  Last had HD on 3/29 with 1 liter removed, post-weight of 91.5 kg, c/b hypotension requiring albumin.  States shortness of breath better, on 4 L NC which he uses at home.    ROS:  +weak, intake adequate, no fevers.    Objective/   GEN:  Chronically ill, BP (!) 96/56   Pulse 83   Temp 97.9 °F (36.6 °C) (Oral)   Resp 16   Ht 1.753 m (5' 9\")   Wt 93.3 kg (205 lb 11 oz)   SpO2 96%   BMI 30.38 kg/m²   HEENT: non-icteric, no JVD  CV: S1, S2 without m/r/g; no LE edema  RESP: CTA B without w/r/r; breathing wnl  ABD: +bs, soft, nt, no hsm  SKIN: warm, no rashes  ACCESS: Left IJ TDC    Data/  Recent Labs     03/28/24  1140 03/29/24  0538 03/30/24  0731   WBC 10.0 11.4* 9.2   HGB 10.6* 10.9* 10.5*   HCT 32.9* 34.5* 32.9*   MCV 88.5 88.7 88.8    292 282     Recent Labs     03/28/24  1140 03/29/24  0538 03/30/24  0731   * 128* 131*   K 4.5 5.1 4.7   CL 92* 89* 92*   CO2 27 25 26   GLUCOSE 171* 233* 118*   MG 1.90 1.90 2.10   BUN 29* 50* 41*   CREATININE 4.9* 5.8* 5.0*   LABGLOM 13* 11* 13*       Assessment/Plan     ESKD.  - On HD MWF at [facility].  - Access: [facility] TDC.  - Outpatient TW 94kg, challenging as tolerated, now at ~91.5 kg.  - Will set post weight today as new TW.     Hyperkalemia.  - Specimen hemolyzed with repeat wnl.  - Renal diet.  - Will adjust K+ bath in HD accordingly.     Anemia.  - Hgb is 10.5 g/dL.  - He gets Mircera 100mcg every 2 weeks and Venofer 50mg qweekly.     Hypertensive Urgency.  - On Metoprolol, Valsartan and Torsemide.  - Improving with fluid removal.     Pulmonary Edema.  - Improving with dialysis.  - Fluid and sodium restriction.     CKD-MBD.  - Continue calcium acetate.  - Renal diet.     Positive BC.  - 1/2 bottles, Staph epi suspect a contaminant.  - Repeat BC on 3/28/24 is pending (obtained prior to receiving 
  [facility] Progress Note           Subjective/   53 y.o. year old male who we are seeing in consultation for ESKD on HD MWF.     HPI:  Resting denies any new complaints  Getting CTA of lower extremity to evaluate for PAD    HD on 4/1 with no net UF, over 3 hours, postweight 96.5 kg  HD on 3/29 with 1 liter removed, post-weight of 91.5 kg, c/b hypotension requiring albumin.    States shortness of breath better, on 4 L NC which he uses at home.    ROS:  +weak, intake adequate, no fevers.    Scheduled Meds:   calcium carbonate  500 mg Oral Once    calcium acetate  2 capsule Oral TID WC    ipratropium 0.5 mg-albuterol 2.5 mg  1 Dose Inhalation BID RT    apixaban  5 mg Oral BID    busPIRone  10 mg Oral TID    DULoxetine  60 mg Oral Daily    insulin glargine  15 Units SubCUTAneous Nightly    metoprolol succinate  25 mg Oral Daily    pravastatin  40 mg Oral Daily    QUEtiapine  50 mg Oral Nightly    sacubitril-valsartan  1 tablet Oral BID    spironolactone  12.5 mg Oral Daily    traZODone  50 mg Oral Nightly    sodium chloride flush  5-40 mL IntraVENous 2 times per day    insulin lispro  0-8 Units SubCUTAneous TID WC    insulin lispro  0-4 Units SubCUTAneous Nightly     Continuous Infusions:   dextrose      sodium chloride       PRN Meds:.famotidine, calcium carbonate, loperamide, ipratropium 0.5 mg-albuterol 2.5 mg, glucose, dextrose bolus **OR** dextrose bolus, glucagon (rDNA), dextrose, sodium chloride flush, sodium chloride, ondansetron **OR** ondansetron, polyethylene glycol, acetaminophen **OR** acetaminophen, oxyCODONE-acetaminophen, heparin (porcine)      Objective/   GEN:  Chronically ill, /61   Pulse 71   Temp 97.9 °F (36.6 °C) (Oral)   Resp 18   Ht 1.753 m (5' 9\")   Wt 96 kg (211 lb 10.3 oz)   SpO2 98%   BMI 31.25 kg/m²   HEENT: non-icteric, no JVD  CV: S1, S2 without m/r/g; no LE edema  RESP: CTA B without w/r/r; breathing wnl  ABD: +bs, soft, nt, no hsm  SKIN: warm, no 
  [facility] Progress Note           Subjective/   53 y.o. year old male who we are seeing in consultation for ESKD on HD MWF.     HPI:  Seen and examined on HD on 4/5 with 3 L net UF, postweight 94.5 kg.  Patient is 3 kg over his target weight of 91.5    HD on 4/3  w 2.6 l net UF , post wt 94.7 kg  HD on 4/1 with no net UF, over 3 hours, postweight 96.5 kg  HD on 3/29 with 1 liter removed, post-weight of 91.5 kg, c/b hypotension requiring albumin.    States shortness of breath better, on 4 L NC which he uses at home.    ROS:  +weak, intake adequate, no fevers.    Scheduled Meds:   insulin lispro  2 Units SubCUTAneous TID WC    heparin (porcine)        epoetin siddharth-epbx        epoetin siddharth-epbx        epoetin siddharth-epbx  3,000 Units IntraVENous Once per day on Mon Wed Fri    And    epoetin siddharth-epbx  2,000 Units IntraVENous Once per day on Mon Wed Fri    calcium carbonate  500 mg Oral Once    calcium acetate  2 capsule Oral TID WC    apixaban  5 mg Oral BID    busPIRone  10 mg Oral TID    DULoxetine  60 mg Oral Daily    insulin glargine  15 Units SubCUTAneous Nightly    metoprolol succinate  25 mg Oral Daily    pravastatin  40 mg Oral Daily    QUEtiapine  50 mg Oral Nightly    sacubitril-valsartan  1 tablet Oral BID    spironolactone  12.5 mg Oral Daily    traZODone  50 mg Oral Nightly    sodium chloride flush  5-40 mL IntraVENous 2 times per day    insulin lispro  0-8 Units SubCUTAneous TID WC    insulin lispro  0-4 Units SubCUTAneous Nightly     Continuous Infusions:   dextrose      sodium chloride       PRN Meds:.heparin (porcine), epoetin siddharth-epbx, epoetin siddharth-epbx, HYDROmorphone, oxyCODONE-acetaminophen, perflutren lipid microspheres, famotidine, ipratropium 0.5 mg-albuterol 2.5 mg, calcium carbonate, loperamide, glucose, dextrose bolus **OR** dextrose bolus, glucagon (rDNA), dextrose, sodium chloride flush, sodium chloride, ondansetron **OR** ondansetron, polyethylene glycol, 
  [facility] Progress Note           Subjective/   53 y.o. year old male who we are seeing in consultation for ESKD on HD MWF.     HPI:  The patient was seen and examined    ROS: No fever or chills.SOB at baseline. No CP    Social: No family at bedside.    Scheduled Meds:   doxycycline hyclate  100 mg Oral 2 times per day    insulin lispro  2 Units SubCUTAneous TID WC    epoetin siddharth-epbx  3,000 Units IntraVENous Once per day on Mon Wed Fri    And    epoetin siddharth-epbx  2,000 Units IntraVENous Once per day on Mon Wed Fri    calcium acetate  2 capsule Oral TID WC    apixaban  5 mg Oral BID    busPIRone  10 mg Oral TID    DULoxetine  60 mg Oral Daily    insulin glargine  15 Units SubCUTAneous Nightly    metoprolol succinate  25 mg Oral Daily    pravastatin  40 mg Oral Daily    QUEtiapine  50 mg Oral Nightly    sacubitril-valsartan  1 tablet Oral BID    traZODone  50 mg Oral Nightly    sodium chloride flush  5-40 mL IntraVENous 2 times per day    insulin lispro  0-8 Units SubCUTAneous TID WC    insulin lispro  0-4 Units SubCUTAneous Nightly     Continuous Infusions:   dextrose      sodium chloride       PRN Meds:.oxyCODONE-acetaminophen, albuterol, HYDROmorphone, perflutren lipid microspheres, famotidine, ipratropium 0.5 mg-albuterol 2.5 mg, calcium carbonate, loperamide, glucose, dextrose bolus **OR** dextrose bolus, glucagon (rDNA), dextrose, sodium chloride flush, sodium chloride, ondansetron **OR** ondansetron, polyethylene glycol, acetaminophen **OR** acetaminophen, heparin (porcine)      Objective/   GEN:  Chronically ill, /83   Pulse 76   Temp 98.5 °F (36.9 °C) (Oral)   Resp 18   Ht 1.753 m (5' 9\")   Wt 104.6 kg (230 lb 9.6 oz)   SpO2 94%   BMI 34.05 kg/m²     HEENT: non-icteric, no JVD  CV: S1, S2 without m/r/g; no LE edema  RESP: CTA B without w/r/r; breathing wnl  ABD: +bs, soft, nt, no hsm  SKIN: warm, no rashes  ACCESS: Left IJ TDC    Data/  Recent Labs     04/07/24  0512 
  [facility] Progress Note           Subjective/   53 y.o. year old male who we are seeing in consultation for ESKD on HD MWF.     HPI:  The patient was seen and examined; he feels well today with no CP, SOB, nausea or vomiting.    ROS: No fever or chills.  Social: No family at bedside.    Scheduled Meds:   vancomycin (VANCOCIN) intermittent dosing (placeholder)   Other RX Placeholder    insulin lispro  2 Units SubCUTAneous TID WC    epoetin siddharth-epbx  3,000 Units IntraVENous Once per day on Mon Wed Fri    And    epoetin siddharth-epbx  2,000 Units IntraVENous Once per day on Mon Wed Fri    calcium acetate  2 capsule Oral TID WC    apixaban  5 mg Oral BID    busPIRone  10 mg Oral TID    DULoxetine  60 mg Oral Daily    insulin glargine  15 Units SubCUTAneous Nightly    metoprolol succinate  25 mg Oral Daily    pravastatin  40 mg Oral Daily    QUEtiapine  50 mg Oral Nightly    sacubitril-valsartan  1 tablet Oral BID    traZODone  50 mg Oral Nightly    sodium chloride flush  5-40 mL IntraVENous 2 times per day    insulin lispro  0-8 Units SubCUTAneous TID WC    insulin lispro  0-4 Units SubCUTAneous Nightly     Continuous Infusions:   dextrose      sodium chloride       PRN Meds:.oxyCODONE-acetaminophen, albuterol, HYDROmorphone, perflutren lipid microspheres, famotidine, ipratropium 0.5 mg-albuterol 2.5 mg, calcium carbonate, loperamide, glucose, dextrose bolus **OR** dextrose bolus, glucagon (rDNA), dextrose, sodium chloride flush, sodium chloride, ondansetron **OR** ondansetron, polyethylene glycol, acetaminophen **OR** acetaminophen, heparin (porcine)      Objective/   GEN:  Chronically ill, /63   Pulse 79   Temp 98.2 °F (36.8 °C)   Resp 18   Ht 1.753 m (5' 9\")   Wt 93.8 kg (206 lb 12.7 oz)   SpO2 97%   BMI 30.54 kg/m²     HEENT: non-icteric, no JVD  CV: S1, S2 without m/r/g; no LE edema  RESP: CTA B without w/r/r; breathing wnl  ABD: +bs, soft, nt, no hsm  SKIN: warm, no 
  [facility] Progress Note           Subjective/   53 y.o. year old male who we are seeing in consultation for ESKD on HD MWF.     HPI:  The patient was seen and examined; he feels well today with no CP, SOB, nausea or vomiting.    ROS: No fever or chills.  Social: No family at bedside.    Scheduled Meds:   vancomycin (VANCOCIN) intermittent dosing (placeholder)   Other RX Placeholder    vancomycin  25 mg/kg IntraVENous Once    insulin lispro  2 Units SubCUTAneous TID WC    epoetin siddharth-epbx  3,000 Units IntraVENous Once per day on Mon Wed Fri    And    epoetin siddharth-epbx  2,000 Units IntraVENous Once per day on Mon Wed Fri    calcium acetate  2 capsule Oral TID WC    apixaban  5 mg Oral BID    busPIRone  10 mg Oral TID    DULoxetine  60 mg Oral Daily    insulin glargine  15 Units SubCUTAneous Nightly    metoprolol succinate  25 mg Oral Daily    pravastatin  40 mg Oral Daily    QUEtiapine  50 mg Oral Nightly    sacubitril-valsartan  1 tablet Oral BID    traZODone  50 mg Oral Nightly    sodium chloride flush  5-40 mL IntraVENous 2 times per day    insulin lispro  0-8 Units SubCUTAneous TID WC    insulin lispro  0-4 Units SubCUTAneous Nightly     Continuous Infusions:   dextrose      sodium chloride       PRN Meds:.oxyCODONE-acetaminophen, albuterol, HYDROmorphone, perflutren lipid microspheres, famotidine, ipratropium 0.5 mg-albuterol 2.5 mg, calcium carbonate, loperamide, glucose, dextrose bolus **OR** dextrose bolus, glucagon (rDNA), dextrose, sodium chloride flush, sodium chloride, ondansetron **OR** ondansetron, polyethylene glycol, acetaminophen **OR** acetaminophen, heparin (porcine)      Objective/   GEN:  Chronically ill, BP (!) 174/70   Pulse 63   Temp 97.8 °F (36.6 °C) (Oral)   Resp 18   Ht 1.753 m (5' 9\")   Wt 92 kg (202 lb 13.2 oz)   SpO2 93%   BMI 29.95 kg/m²     HEENT: non-icteric, no JVD  CV: S1, S2 without m/r/g; no LE edema  RESP: CTA B without w/r/r; breathing wnl  ABD: 
  Vascular Surgery Progress Note      Chief Complaint: Preoperative follow up      SUBJECTIVE:  has no new complaints    OBJECTIVE    Physical  CURRENT VITALS:  /64   Pulse 73   Temp 97.5 °F (36.4 °C) (Oral)   Resp 18   Ht 1.753 m (5' 9\")   Wt 95.9 kg (211 lb 6.4 oz)   SpO2 95% Comment: 4L O2  BMI 31.22 kg/m²   24 HR INTAKE/OUTPUT:    Intake/Output Summary (Last 24 hours) at 4/1/2024 1111  Last data filed at 4/1/2024 0637  Gross per 24 hour   Intake 720 ml   Output 0 ml   Net 720 ml       Right lower extremity warm with chronic non-healing wounds on foot  Doppler pulses      Data  CBC:   Lab Results   Component Value Date/Time    WBC 9.2 03/30/2024 07:31 AM    RBC 3.70 03/30/2024 07:31 AM    HGB 10.5 03/30/2024 07:31 AM    HCT 32.9 03/30/2024 07:31 AM    MCV 88.8 03/30/2024 07:31 AM    MCH 28.4 03/30/2024 07:31 AM    MCHC 32.0 03/30/2024 07:31 AM    RDW 16.5 03/30/2024 07:31 AM     03/30/2024 07:31 AM    MPV 7.9 03/30/2024 07:31 AM     BMP:    Lab Results   Component Value Date/Time     03/31/2024 07:00 AM    K 5.0 03/31/2024 07:00 AM    K 5.9 03/27/2024 12:25 AM    CL 91 03/31/2024 07:00 AM    CO2 26 03/31/2024 07:00 AM    BUN 60 03/31/2024 07:00 AM    LABALBU 3.6 03/27/2024 12:25 AM    CREATININE 6.7 03/31/2024 07:00 AM    CALCIUM 9.4 03/31/2024 07:00 AM    GFRAA 10 10/05/2022 11:53 AM    GFRAA >60 05/17/2013 06:50 AM    LABGLOM 9 03/31/2024 07:00 AM    LABGLOM 12 01/25/2024 12:00 AM    GLUCOSE 103 03/31/2024 07:00 AM     Lower extremity Arterial Duplex 4/1/2024:  Right  The right ankle/brachial index is 0.8 (the DP is 110 and the PT demonstrated doppler silence).  The common femoral artery demonstrates abnormal monophasic flow indicating  significant aortic-iliac inflow disease.  Elevated velocities at the mid superficial femoral artery indicate a > 50% stenosis by velocity criteria (velocity went from 103 to 209 cm/s).  There was evidence of severe atherosclerotic plaque demonstrated in 
/43 while sitting in chair.   Denies symptoms.  Refuses to get back to bed at this time.    
1006: This nurse reached out to Dr. Spencer RE: Na yesterday before HD was 129; today is 124. Yesterday before HD K+ was 5.5; today is 5.4. Pt. Is scheduled for surgery later this afternoon.    1008: provider  stated contact anesthesia and nephrology to verify clearance for surgery.     This nurse called anesthesia and had no concerns for chronic electrolyte imbalance. This nurse sent a message to nephrologist as well.     No new orders at this time.    
Attempted to place patient on bipap. States he isn't ready yet. Wants to snack.   
CHF Care Plan      Patient's EF (Ejection Fraction) is less than 40%    Heart Failure Medications:  Diuretics:: Furosemide, Torsemide, Spironolactone, Metalozone, Other, and None    (One of the following REQUIRED for EF </= 40%/SYSTOLIC FAILURE but MAY be used in EF% >40%/DIASTOLIC FAILURE)        ACE:: None        ARB:: None         ARNI:: Sacubitril/Valsartan-Entresto    (Beta Blockers)  NON- Evidenced Based Beta Blocker (for EF% >40%/DIASTOLIC FAILURE): Metoprolol TARTrate- Lopressor    Evidenced Based Beta Blocker::(REQUIRED for EF% <40%/SYSTOLIC FAILURE) Metoprolol SUCCinate- Toprol XL  ...................................................................................................................................................    Failed to redirect to the Timeline version of the [employer] SmartLink.      Patient's weights and intake/output reviewed    Daily Weight log at bedside, patient/family participation in use of log: \"yes    Patient's current weight today shows a difference of 1.8 lbs more than last documented weight.      Intake/Output Summary (Last 24 hours) at 3/30/2024 0723  Last data filed at 3/29/2024 2123  Gross per 24 hour   Intake 1080 ml   Output 1500 ml   Net -420 ml       Education Booklet Provided: yes    Comorbidities Reviewed Yes    Patient has a past medical history of Ambulatory dysfunction, Aortic stenosis, Arthritis, Asthma, Bilateral hilar adenopathy syndrome, CAD (coronary artery disease), Cardiomyopathy (HCC), CHF (congestive heart failure) ([facility]), Chronic pain, COPD (chronic obstructive pulmonary disease) (HCC), CVA (cerebral vascular accident) (HCC), Depression, Diabetes mellitus (HCC), Difficult intravenous access, Emphysema of lung (HCC), ESRD (end stage renal disease) on dialysis (HCC), Fear of needles, Gastric ulcer, GERD (gastroesophageal reflux disease), Heart valve problem, Hemodialysis patient (HCC), History of spinal fracture, Hx of blood clots, Hyperlipidemia, 
CHF Care Plan      Patient's EF (Ejection Fraction) is less than 40%    Heart Failure Medications:  Diuretics:: Spironolactone    (One of the following REQUIRED for EF </= 40%/SYSTOLIC FAILURE but MAY be used in EF% >40%/DIASTOLIC FAILURE)        ACE:: None        ARB:: None         ARNI:: Sacubitril/Valsartan-Entresto    (Beta Blockers)  NON- Evidenced Based Beta Blocker (for EF% >40%/DIASTOLIC FAILURE): None    Evidenced Based Beta Blocker::(REQUIRED for EF% <40%/SYSTOLIC FAILURE) Metoprolol SUCCinate- Toprol XL  ...................................................................................................................................................    Failed to redirect to the Timeline version of the [employer] SmartLink.      Patient's weights and intake/output reviewed    Daily Weight log at bedside, patient/family participation in use of log: \"yes    Patient's current weight today shows a difference of 1 lbs more than last documented weight.      Intake/Output Summary (Last 24 hours) at 3/30/2024 1445  Last data filed at 3/29/2024 2123  Gross per 24 hour   Intake 530 ml   Output --   Net 530 ml       Education Booklet Provided: yes    Comorbidities Reviewed Yes    Patient has a past medical history of Ambulatory dysfunction, Aortic stenosis, Arthritis, Asthma, Bilateral hilar adenopathy syndrome, CAD (coronary artery disease), Cardiomyopathy (HCC), CHF (congestive heart failure) ([facility]), Chronic pain, COPD (chronic obstructive pulmonary disease) (HCC), CVA (cerebral vascular accident) ([facility]), Depression, Diabetes mellitus (HCC), Difficult intravenous access, Emphysema of lung (HCC), ESRD (end stage renal disease) on dialysis ([facility]), Fear of needles, Gastric ulcer, GERD (gastroesophageal reflux disease), Heart valve problem, Hemodialysis patient ([facility]), History of spinal fracture, Hx of blood clots, Hyperlipidemia, Hypertension, MI (myocardial infarction) ([facility]), Neuromuscular disorder ([facility]), Numbness and 
CHF Care Plan      Patient's EF (Ejection Fraction) is less than 40%    Heart Failure Medications:  Diuretics:: Spironolactone    (One of the following REQUIRED for EF </= 40%/SYSTOLIC FAILURE but MAY be used in EF% >40%/DIASTOLIC FAILURE)        ACE:: None        ARB:: None         ARNI:: Sacubitril/Valsartan-Entresto    (Beta Blockers)  NON- Evidenced Based Beta Blocker (for EF% >40%/DIASTOLIC FAILURE): None    Evidenced Based Beta Blocker::(REQUIRED for EF% <40%/SYSTOLIC FAILURE) None  ...................................................................................................................................................    Failed to redirect to the Timeline version of the [employer] SmartLink.      Patient's weights and intake/output reviewed    Daily Weight log at bedside, patient/family participation in use of log: \"yes    Patient's current weight today shows a difference of 0 lbs  pt refused weight  than last documented weight.      Intake/Output Summary (Last 24 hours) at 3/31/2024 1717  Last data filed at 3/31/2024 1541  Gross per 24 hour   Intake 960 ml   Output 0 ml   Net 960 ml       Education Booklet Provided: yes    Comorbidities Reviewed Yes    Patient has a past medical history of Ambulatory dysfunction, Aortic stenosis, Arthritis, Asthma, Bilateral hilar adenopathy syndrome, CAD (coronary artery disease), Cardiomyopathy (HCC), CHF (congestive heart failure) ([facility]), Chronic pain, COPD (chronic obstructive pulmonary disease) ([facility]), CVA (cerebral vascular accident) ([facility]), Depression, Diabetes mellitus (HCC), Difficult intravenous access, Emphysema of lung ([facility]), ESRD (end stage renal disease) on dialysis ([facility]), Fear of needles, Gastric ulcer, GERD (gastroesophageal reflux disease), Heart valve problem, Hemodialysis patient ([facility]), History of spinal fracture, Hx of blood clots, Hyperlipidemia, Hypertension, MI (myocardial infarction) ([facility]), Neuromuscular disorder ([facility]), Numbness and tingling in left 
Care assumed from previous RN. A&O, VSS, NSR on tele, assessment complete as documented. SpO2 WNL on 4L O2 (his home dose). Medicated per MAR with Percocet 5/325 1 tab for chronic 8/10 back and hip pain; will re eval for therapeutic effect. Bed alarm activated, pt aware of need to call for ambulation assistance. Denies other needs at this time.  
Comprehensive Nutrition Assessment    Type and Reason for Visit:  Reassess    Nutrition Recommendations/Plan:   Continue low sodium, low potassium diet and encourage PO intake   FR per MD   RD to add nepro once daily   Monitor nutrition adequacy, pertinent labs, bowel habits, wt changes, and clinical progress     Malnutrition Assessment:  Malnutrition Status:  At risk for malnutrition (Comment) (04/10/24 1216)    Context:  Acute Illness     Findings of the 6 clinical characteristics of malnutrition:  Energy Intake:  Mild decrease in energy intake (Comment)  Fluid Accumulation:  Mild Generalized    Nutrition Assessment:    Follow up: OR on 4/4/24, s/p TMA. Pt continues on low sodium, low potassium diet, PO intakes mostly % of meals. S/p HD today. Pt in HD at time of visit. Wt stable in EMR. Will add nepro once daily per pt preference. Continue to encourage PO intake, will continue to monitor.    Nutrition Related Findings:    BG WNL. GFR 9. Cr 6.7 Na 124. Wound Type: Surgical Incision (nonhealing surgical, traumatic)       Current Nutrition Intake & Therapies:    Average Meal Intake: %, 26-50%  Average Supplements Intake: Unable to assess  ADULT DIET; Regular; Low Sodium (2 gm); Low Potassium (Less than 3000 mg/day); 1200 ml    Anthropometric Measures:  Height: 175.3 cm (5' 9\")  Ideal Body Weight (IBW): 160 lbs (73 kg)       Current Body Weight: 93.9 kg (207 lb),   IBW. Weight Source: Bed Scale  Current BMI (kg/m2): 30.6        Weight Adjustment For: No Adjustment                 BMI Categories: Obese Class 1 (BMI 30.0-34.9)    Estimated Daily Nutrient Needs:  Energy Requirements Based On: Kcal/kg (30-35)  Weight Used for Energy Requirements: Ideal  Energy (kcal/day): 4211-1737 kcals  Weight Used for Protein Requirements: Ideal (1.2-1.5)  Protein (g/day):  g  Method Used for Fluid Requirements: Other (Comment)  Fluid (ml/day): less than 2000 ml/day per CHF guidelines    Nutrition Diagnosis:   No 
D: Patient here for procedure. A: Pre op check list completed and documented, assessment completed and documented, call light is in reach.  
Found pt off of bipap, went into room to wake and place him back on, pt woke and stated he did not want to go on and that he couldn't breathe with it on.  I offered to aid in some adjustments to the bipap provided he went back on, pt again refused to use the bipap. Reminded pt this would be the first course of action if there was any respiratory distress   
Nephrologist informed of this morning's bp 98/56 and that I held metoprolol and entresto. Informed that he had to have albumin in dialysis . Do you want me to continue to hold these two meds today or give? please call to give phone order. Also today's abnormal labs :n  NA +124, Creatinine 6.7, BUN 59  H/H 9.5/30.   
Occupational Therapy    OT orders received, chart reviewed. Spoke w/ RN who stated pt is off floor for dialysis. Will follow up as pt condition and therapy schedule permit.       Ashley Lucas, OTR/L  
Occupational Therapy    Orders received, chart reviewed. Attempted OT/PT reval. Pt off floor for dialysis. OT/PT will continue to follow pt and attempt re-eval when deemed medically appropriate and as schedule allows. No charge. Thank you.    Chelsea Tam OTR/АННА [place] PT/DPT    
Occupational Therapy  [address]  Occupational Therapy Initial Assessment    Name: Igor Ann  : 1970  MRN: [id number]  Date of Service: 3/31/2024    Discharge Recommendations:  Subacute/Skilled Nursing Facility  Therapy discharge recommendations take into account each patient's current medical complexities and are subject to input/oversight from the patient's healthcare team.   Barriers to Home Discharge:   [x] Steps to access home entry or bed/bath:   [x] Unable to transfer, ambulater household distances without assist   [x] Limited available assist at home upon discharge    [x] Patient  requests d/c to post-acute facility       If pt is unable to be seen after this session, please let this note serve as discharge summary.  Please see case management note for discharge disposition.  Thank you.           Patient Diagnosis(es): The primary encounter diagnosis was ESRD (end stage renal disease) on dialysis ([facility]). Diagnoses of Flash pulmonary edema ([facility]), Atrial fibrillation, unspecified type ([facility]), and Acute on chronic congestive heart failure, unspecified heart failure type ([facility]) were also pertinent to this visit.  Past Medical History:  has a past medical history of Ambulatory dysfunction, Aortic stenosis, Arthritis, Asthma, Bilateral hilar adenopathy syndrome, CAD (coronary artery disease), Cardiomyopathy ([facility]), CHF (congestive heart failure) ([facility]), Chronic pain, COPD (chronic obstructive pulmonary disease) ([facility]), CVA (cerebral vascular accident) ([facility]), Depression, Diabetes mellitus ([facility]), Difficult intravenous access, Emphysema of lung ([facility]), ESRD (end stage renal disease) on dialysis ([facility]), Fear of needles, Gastric ulcer, GERD (gastroesophageal reflux disease), Heart valve problem, Hemodialysis patient ([facility]), History of spinal fracture, Hx of blood clots, Hyperlipidemia, Hypertension, MI (myocardial infarction) ([facility]), Neuromuscular disorder ([facility]), Numbness and tingling in 
Occupational Therapy  Facility/Department: [facility] - MED SURG/ORTHO  Daily Treatment Note  NAME: Igor Ann  : 1970  MRN: [id number]    Date of Service: 2024    Discharge Recommendations:  Subacute/Skilled Nursing Facility  OT Equipment Recommendations  Equipment Needed: No    Therapy discharge recommendations are subject to collaboration from the patient’s interdisciplinary healthcare team, including MD and case management recommendations.    Barriers to Home Discharge:   [] Steps to access home entry or bed/bath:   [x] Unable to transfer, ambulate, or propel wheelchair household distances without assist   [x] Limited available assist at home upon discharge    [] Patient or family requests d/c to post-acute facility    [x] Poor cognition/safety awareness for d/c to home alone    [x] Unable to maintain ordered weight bearing status    [] Patient with salient signs of long-standing immobility   [x] Decreased independence with ADLs   [x] Increased risk for falls   [] Other:    If pt is unable to be seen after this session, please let this note serve as discharge summary.  Please see case management note for discharge disposition.  Thank you.    Patient Diagnosis(es): The primary encounter diagnosis was ESRD (end stage renal disease) on dialysis (HCC). Diagnoses of Flash pulmonary edema (HCC), Atrial fibrillation, unspecified type ([facility]), Acute on chronic congestive heart failure, unspecified heart failure type (HCC), and Osteomyelitis of ankle or foot, left, acute ([facility]) were also pertinent to this visit.     Assessment    Assessment: Pt tolerated OT/PT cotx fair, but was limited by pain. Co-tx collaboration this date to safely meet goals and will have better occupational performance outcomes with in a co-treatment than 1:1 session. He performs bed mobility with SBA, functional transfers with modAx2-maxAx2, and is unsafe to perform mobility d/t poor adherence to NWB restrictions (demo'd improved 
Occupational Therapy  Facility/Department: [place] [address] - MED SURG/ORTHO  Occupational Therapy Initial Assessment    Name: Igor Ann  : 1970  MRN: [id number]  Date of Service: 2024    Discharge Recommendations:  Subacute/Skilled Nursing Facility  OT Equipment Recommendations  Equipment Needed: No       Patient Diagnosis(es): The primary encounter diagnosis was ESRD (end stage renal disease) on dialysis (HCC). Diagnoses of Flash pulmonary edema (HCC), Atrial fibrillation, unspecified type (HCC), Acute on chronic congestive heart failure, unspecified heart failure type (HCC), and Osteomyelitis of ankle or foot, left, acute (HCC) were also pertinent to this visit.  Past Medical History:  has a past medical history of Ambulatory dysfunction, Aortic stenosis, Arthritis, Asthma, Bilateral hilar adenopathy syndrome, CAD (coronary artery disease), Cardiomyopathy (HCC), CHF (congestive heart failure) ([facility]), Chronic pain, COPD (chronic obstructive pulmonary disease) ([facility]), CVA (cerebral vascular accident) ([facility]), Depression, Diabetes mellitus (HCC), Difficult intravenous access, Emphysema of lung (HCC), ESRD (end stage renal disease) on dialysis (HCC), Fear of needles, Gastric ulcer, GERD (gastroesophageal reflux disease), Heart valve problem, Hemodialysis patient ([facility]), History of spinal fracture, Hx of blood clots, Hyperlipidemia, Hypertension, MI (myocardial infarction) (HCC), Neuromuscular disorder (HCC), Numbness and tingling in left arm, Pneumonia, PONV (postoperative nausea and vomiting), Prolonged emergence from general anesthesia, Sleep apnea, Stroke (HCC), TIA (transient ischemic attack), and Unspecified diseases of blood and blood-forming organs.  Past Surgical History:  has a past surgical history that includes Tonsillectomy; cyst removal (2013); Colonoscopy; Coronary angioplasty with stent (05/26/15); vascular surgery (aprx 2 years ago); Colonoscopy (); Upper gastrointestinal 
Occupational Therapy  Facility/Department: [place] [address] [facility]  Daily Treatment Note  NAME: Igor Ann  : 1970  MRN: [id number]    Date of Service: 2024    Discharge Recommendations:  Subacute/Skilled Nursing Facility        Barriers to Home Discharge:   [] Steps to access home entry or bed/bath   [] Unable to transfer, ambulate, or propel wheelchair household distances without assist   [x] Limited available assist at home upon discharge    [] Patient or family requests d/c to post-acute facility    [] Poor cognition/safety awareness for d/c to home alone    []Unable to maintain ordered weight bearing status    [] Patient with salient signs of long-standing immobility   [x] Patient is at risk for falls   [x] Other: Decreased safety and independence w/ ADLs.     Patient Diagnosis(es): The primary encounter diagnosis was ESRD (end stage renal disease) on dialysis (HCC). Diagnoses of Flash pulmonary edema (HCC), Atrial fibrillation, unspecified type (HCC), and Acute on chronic congestive heart failure, unspecified heart failure type (HCC) were also pertinent to this visit.     Assessment    Assessment: Pt tolerated session well overall, requires increased time and encouragement at times for participation, pt declined participation in most BADLs. Pt tolerated UE therex x20 reps with minimal rest breaks and no c/o fatigue or increased pain. Pt grossly SBA with RW for functional transfers and mobility. Pt scheduled for TMA this PM, will require new OT orders following surgery. Continue to recommend SNF at this time.  Activity Tolerance: Patient tolerated treatment well  Discharge Recommendations: Subacute/Skilled Nursing Facility      Plan   Occupational Therapy Plan  Times Per Week: 3-4x/week     Restrictions  Restrictions/Precautions  Restrictions/Precautions: Fall Risk;Contact Precautions;Up as Tolerated  Position Activity Restriction  Other position/activity restrictions: up with assist, telemetry, 
Patient back from dialysis.  Patient drowsy Pain in leg is 10/10.  Vital signs stable.   
Patient not ready for Bipap at this time, will call when ready.  
Patient out of room at this time, Wound Care will return later in day.  
Patient still out of room for proceedures.   Was told yesterday by nurse on prevalence wound ck there really wasn't anything there.   Will again try to check in on patient on Monday.  
Physical Therapy  Attempted to see pt this afternoon. Pt off floor in vascular lab.  Will re-attempt at a later date         Cathie Frost, PT        
Physical Therapy  [address]  Physical Therapy Initial Assessment/ Treatment    Name: Igor Ann  : 1970  MRN: [id number]  Date of Service: 3/30/2024    Discharge Recommendations:  24 hour supervision or assist, Home with Home health PT   PT Equipment Recommendations  Equipment Needed: No    If pt discharges prior to next PT session this note will serve as discharge summary.   Patient Diagnosis(es): The primary encounter diagnosis was ESRD (end stage renal disease) on dialysis (HCC). Diagnoses of Flash pulmonary edema (HCC), Atrial fibrillation, unspecified type (HCC), and Acute on chronic congestive heart failure, unspecified heart failure type (HCC) were also pertinent to this visit.  Past Medical History:  has a past medical history of Ambulatory dysfunction, Aortic stenosis, Arthritis, Asthma, Bilateral hilar adenopathy syndrome, CAD (coronary artery disease), Cardiomyopathy (HCC), CHF (congestive heart failure) (HCC), Chronic pain, COPD (chronic obstructive pulmonary disease) (HCC), CVA (cerebral vascular accident) (HCC), Depression, Diabetes mellitus (HCC), Difficult intravenous access, Emphysema of lung (HCC), ESRD (end stage renal disease) on dialysis (HCC), Fear of needles, Gastric ulcer, GERD (gastroesophageal reflux disease), Heart valve problem, Hemodialysis patient (HCC), History of spinal fracture, Hx of blood clots, Hyperlipidemia, Hypertension, MI (myocardial infarction) (HCC), Neuromuscular disorder (HCC), Numbness and tingling in left arm, Pneumonia, PONV (postoperative nausea and vomiting), Prolonged emergence from general anesthesia, Sleep apnea, Stroke (HCC), TIA (transient ischemic attack), and Unspecified diseases of blood and blood-forming organs.  Past Surgical History:  has a past surgical history that includes Tonsillectomy; cyst removal (2013); Colonoscopy; Coronary angioplasty with stent (05/26/15); vascular surgery (aprx 2 years ago); 
Physical Therapy  Facility/Department: [facility] C5 - MED SURG/ORTHO  Daily Treatment Note  NAME: Igor Ann  : 1970  MRN: [id number]    Date of Service: 2024    Discharge Recommendations:  Subacute/Skilled Nursing Facility   PT Equipment Recommendations  Equipment Needed: No  Other: TBD at rehab facility    Patient Diagnosis(es): The primary encounter diagnosis was ESRD (end stage renal disease) on dialysis (HCC). Diagnoses of Flash pulmonary edema (HCC), Atrial fibrillation, unspecified type (HCC), Acute on chronic congestive heart failure, unspecified heart failure type (HCC), and Osteomyelitis of ankle or foot, left, acute (HCC) were also pertinent to this visit.    Barriers to Home Discharge:   [x] Steps to access home entry or bed/bath: 4 JOSIE    [x] Unable to transfer, ambulate, or propel wheelchair household distances without assist   [x] Limited available assist at home upon discharge    [] Patient or family requests d/c to post-acute facility    [x] Poor cognition/safety awareness for d/c to home alone    [x] Unable to maintain ordered weight bearing status    [] Patient with salient signs of long-standing immobility   [x] Decreased independence with ADLs   [x] Increased risk for falls   [] Other:    Assessment   Assessment: Pt demons improved bed mobility this date, able to complete with SBA. Continues to require mod Ax2 for sit<>stands. provided pt with max verbal and manual cues for NWB to RLEand and pt is aware of NWB precautions and importanceof them although pt does not follow them. With repetition pt with improved compliance with NWB orders during sit<>stand transfers. Recommend SNF at D/C in light of current deficits and inability to consistently maintain NWB status with transfers. Co-tx collaboration this date to safely meet goals and will have better occupational performance outcomes with in a co-treatment than 1:1 session.    Activity Tolerance: Patient tolerated treatment 
Physical Therapy  Facility/Department: [place] [address] - MED SURG/ORTHO  Re-Evaluation/Daily Treatment Note    NAME: Igor Ann  : 1970  MRN: [id number]    Date of Service: 2024    Discharge Recommendations:  Subacute/Skilled Nursing Facility   PT Equipment Recommendations  Equipment Needed: No  Other: TBD at rehab facility    Therapy discharge recommendations are subject to collaboration from the patient’s interdisciplinary healthcare team, including MD and case management recommendations.    Barriers to Home Discharge:   [x] Steps to access home entry or bed/bath: 4 JOSIE with 1 handrail   [x] Unable to transfer, ambulate, or propel wheelchair household distances without assist   [x] Limited available assist at home upon discharge    [x] Patient or family requests d/c to post-acute facility    [] Poor cognition/safety awareness for d/c to home alone    [x] Unable to maintain ordered weight bearing status (pt unable to maintain NWB RLE during transfers)   [] Patient with salient signs of long-standing immobility   [x] Decreased independence with ADLs   [x] Increased risk for falls    If pt is unable to be seen after this session, please let this note serve as discharge summary.  Please see case management note for discharge disposition.  Thank you.    Patient Diagnosis(es): The primary encounter diagnosis was ESRD (end stage renal disease) on dialysis ([facility]). Diagnoses of Flash pulmonary edema ([facility]), Atrial fibrillation, unspecified type ([facility]), Acute on chronic congestive heart failure, unspecified heart failure type ([facility]), and Osteomyelitis of ankle or foot, left, acute ([facility]) were also pertinent to this visit.    Assessment   Assessment: Pt referred for PT re-evaluation following R foot TMT amputation surgery on 24.  Pt now NWB RLE per podiatry team and cleared to resume working with therapy.  Today pt required modA x 2 for bed mobility and transfers. Pt too weak to remain standing for more than a few 
Physical Therapy  Facility/Department: [facility] C4 PCU  Daily Treatment Note  NAME: Igor Ann  : 1970  MRN: [id number]    Date of Service: 2024    Discharge Recommendations:  Long Term Care with PT (pt reports he and his wife are unable to manage his medical needs adequately at home and are interested in pursuing LTC upon D/C, recommend LTC with \"Part B\" PT services upon D/C)   PT Equipment Recommendations  Equipment Needed: No  Other: Pt has rollator for home use    Patient Diagnosis(es): The primary encounter diagnosis was ESRD (end stage renal disease) on dialysis (HCC). Diagnoses of Flash pulmonary edema (HCC), Atrial fibrillation, unspecified type (HCC), and Acute on chronic congestive heart failure, unspecified heart failure type (HCC) were also pertinent to this visit.    Assessment   Assessment: Pt tolerated UE therex x20 reps with minimal rest breaks and no c/o fatigue or increased pain. Pt participated well with grossly SBA to SUP with RW for functional transfers and ambulation. Pt scheduled for TMA this PM, will require new PT orders following surgery.  Recommend LTC with \"Part B\" PT services upon D/C.  Activity Tolerance: Patient tolerated treatment well  Equipment Needed: No  Other: Pt has rollator for home use     Plan    Physical Therapy Plan  General Plan: 3-5 times per week  Specific Instructions for Next Treatment: Assess stair amb at next session  Current Treatment Recommendations: Strengthening;Balance training;Functional mobility training;Gait training;Therapeutic activities;Home exercise program;Safety education & training;Stair training;Transfer training;Endurance training     Restrictions  Restrictions/Precautions  Restrictions/Precautions: Fall Risk, Contact Precautions, Up as Tolerated  Required Braces or Orthoses?: No  Position Activity Restriction  Other position/activity restrictions: up with assist, telemetry, O2     Subjective    Subjective  Subjective: Pt agreeable to 
Physical Therapy/Occupational Therapy    PT/OT attempted to see pt for initial therapy evaluations, however pt off the floor at this time.    Addendum: Attempt to see pt at 1309 however pt off the floor.    Will follow up with pt as schedule allows.  Thank you,  Grace Ang, PT, DPT  Britney Goode, OTR/L  
Physical Therapy/Occupational Therapy  Attempted to see pt at this time for PT/OT f/u. Pt currently off floor. Will re-attempt as schedule permits.   Thanks,  Britney Kidd PT, DPT  Flora Ramírez OTR/L    
Physical/Occupational Therapy    Attempted to see patient this morning, RN cleared for therapy. Pt refusing therapy at this time stating he is in 10/10 pain and is not getting up. RN notified of increased pain. Will attempt at a later time/date as pt is appropriate and schedule permits. Thank you.     Katalina Tong PT, DPT  Grace Noble OTR/L  
Physical/Occupational Therapy    Pt is currently off the floor for dialysis. Will attempt at a later time/date as pt is appropriate and schedule permits. Thank you.     Katalina Tong PT, DPT   Rakel Hubbard OT  
Podiatric Surgery Daily Progress Note  Igor Ann  Subjective :   Patient seen and examined at the bedside. Patient denies any acute overnight events. Patient denies N/V/F/C/SOB. Patient denies calf pain, thigh pain, chest pain.        Objective     /76   Pulse 68   Temp 97.7 °F (36.5 °C) (Oral)   Resp 20   Ht 1.753 m (5' 9\")   Wt 95 kg (209 lb 7 oz)   SpO2 98%   BMI 30.93 kg/m²      I/O:  Intake/Output Summary (Last 24 hours) at 4/6/2024 0939  Last data filed at 4/6/2024 0858  Gross per 24 hour   Intake 2062 ml   Output 3543 ml   Net -1481 ml              Wt Readings from Last 3 Encounters:   04/06/24 95 kg (209 lb 7 oz)   01/24/24 85.3 kg (188 lb)   01/15/24 85.7 kg (188 lb 15 oz)       LABS:    Recent Labs     04/05/24  0930 04/06/24  0440   WBC 8.6 10.2   HGB 10.1* 9.9*   HCT 30.9* 31.4*    218        Recent Labs     04/05/24  0930 04/06/24  0440   * 126*   K 5.3* 5.5*   CL 85* 89*   CO2 28 24   PHOS 5.0*  --    BUN 60* 40*   CREATININE 7.1* 4.7*      No results for input(s): \"PROT\", \"INR\", \"APTT\" in the last 72 hours.        LOWER EXTREMITY EXAMINATION    Integument:  Skin is warm, dry and supple, bilateral. Closed surgical incision to the TMA amputation stump with wound edges well coapted, no drainage, all sutures intact, no SOI. Ulcer to the dorsal medial left 2nd toe measuring approximately 1.1 cm x 0.5 cm x 0.1 cm deep. Wound base granular, no probe to bone, no purulence able to be expressed.            Neurologic:  Protective sensation is grossly diminished to light touch at the level of the toes, bilateral.  Vascular:  DP and PT pulses are nonpalpable, bilateral. No significant edema appreciated.  Musculoskeletal:  Patient has 5/5 strength on inversion/everison/dorsiflexion/plantarflexion, bilateral.  No gross musculoskeletal deformities noted. S/p R TMA      Imaging: R foot xray: no cortical erosions   - R foot MRI: OM of the distal 4th MT shaft/head, no deep space 
Podiatric Surgery Daily Progress Note  Igor Ann  Subjective :   Patient seen and examined at the bedside. Patient denies any acute overnight events. Patient denies N/V/F/C/SOB. Patient denies calf pain, thigh pain, chest pain.        Objective     BP (!) 105/43   Pulse 96   Temp 98.2 °F (36.8 °C) (Oral)   Resp 16   Ht 1.753 m (5' 9\")   Wt 93.8 kg (206 lb 12.7 oz)   SpO2 99%   BMI 30.54 kg/m²      I/O:  Intake/Output Summary (Last 24 hours) at 4/10/2024 1954  Last data filed at 4/10/2024 1235  Gross per 24 hour   Intake 700 ml   Output 1200 ml   Net -500 ml              Wt Readings from Last 3 Encounters:   04/10/24 93.8 kg (206 lb 12.7 oz)   01/24/24 85.3 kg (188 lb)   01/15/24 85.7 kg (188 lb 15 oz)       LABS:    Recent Labs     04/10/24  0925 04/10/24  1421   WBC 10.1 9.0   HGB 9.5* 10.1*   HCT 30.3* 31.4*    242        Recent Labs     04/09/24  1440 04/10/24  0925   * 124*   K 4.8 5.0   CL 88* 86*   CO2 25 23   PHOS 4.0  --    BUN 48* 59*   CREATININE 5.4* 6.7*      No results for input(s): \"PROT\", \"INR\", \"APTT\" in the last 72 hours.        LOWER EXTREMITY EXAMINATION    Integument:  Skin is warm, dry and supple, bilateral. Closed surgical incision to the TMA amputation stump with wound edges well coapted, no drainage, all sutures intact, no SOI. New ischemia noted to the right foot medial plantar stump. Ulcer to the dorsal medial left 2nd toe measuring approximately 1.1 cm x 0.5 cm x 0.1 cm deep. Wound base granular, no probe to bone, no purulence able to be expressed.            Neurologic:  Protective sensation is grossly diminished to light touch at the level of the toes, bilateral.  Vascular:  DP and PT pulses are nonpalpable, bilateral. No significant edema appreciated.  Musculoskeletal:  Patient has 5/5 strength on inversion/everison/dorsiflexion/plantarflexion, bilateral.  No gross musculoskeletal deformities noted. S/p R TMA      Imaging: R foot xray: no cortical erosions 
Podiatric Surgery Daily Progress Note  Igor Ann  Subjective :   Patient seen and examined at the bedside. Patient denies any acute overnight events. Patient denies N/V/F/C/SOB. Patient denies calf pain, thigh pain, chest pain.        Objective     BP (!) 169/94   Pulse 85   Temp 97.6 °F (36.4 °C) (Oral)   Resp 20   Ht 1.753 m (5' 9\")   Wt 97.3 kg (214 lb 8.1 oz)   SpO2 92%   BMI 31.68 kg/m²      I/O:  Intake/Output Summary (Last 24 hours) at 4/3/2024 1826  Last data filed at 4/3/2024 1712  Gross per 24 hour   Intake 240 ml   Output --   Net 240 ml              Wt Readings from Last 3 Encounters:   04/03/24 97.3 kg (214 lb 8.1 oz)   01/24/24 85.3 kg (188 lb)   01/15/24 85.7 kg (188 lb 15 oz)       LABS:    Recent Labs     04/03/24  0800   WBC 7.2   HGB 9.5*   HCT 30.4*           Recent Labs     04/03/24  0800   *   K 5.5*   CL 91*   CO2 23   PHOS 5.4*   BUN 64*   CREATININE 7.7*      No results for input(s): \"PROT\", \"INR\", \"APTT\" in the last 72 hours.        LOWER EXTREMITY EXAMINATION    Integument:  Skin is warm, dry and supple, bilateral.  Ulcer to the dorsal medial left 2nd toe measuring approximately 1.1 cm x 0.5 cm x 0.1 cm deep. Wound base granular, no probe to bone, no purulence able to be expressed. Ulcer to the plantar lateral right 4th MPJ measuring approximately 3 cm x 3 cm x 0.5 cm deep w/ deep fascia/muscle exposure noted. Wound base 100% fibrotic. Area probes to bone, no purulence able to be expressed. No acute signs of infection b/l.         Neurologic:  Protective sensation is grossly diminished to light touch at the level of the toes, bilateral.  Vascular:  DP and PT pulses are nonpalpable, bilateral.  CFT is brisk to all toes.  No significant edema appreciated.  Musculoskeletal:  Patient has 5/5 strength on inversion/everison/dorsiflexion/plantarflexion, bilateral.  No gross musculoskeletal deformities noted. Previous right partial 5th ray amputation      Imaging: R 
Podiatric Surgery Daily Progress Note  Igor Ann  Subjective :   Patient seen and examined at the bedside. Patient denies any acute overnight events. Patient denies N/V/F/C/SOB. Patient denies calf pain, thigh pain, chest pain.        Objective     BP (!) 174/70   Pulse 63   Temp 97.8 °F (36.6 °C) (Oral)   Resp 18   Ht 1.753 m (5' 9\")   Wt 92 kg (202 lb 13.2 oz)   SpO2 93%   BMI 29.95 kg/m²      I/O:  Intake/Output Summary (Last 24 hours) at 4/7/2024 1055  Last data filed at 4/6/2024 1949  Gross per 24 hour   Intake 906 ml   Output 50 ml   Net 856 ml              Wt Readings from Last 3 Encounters:   04/06/24 92 kg (202 lb 13.2 oz)   01/24/24 85.3 kg (188 lb)   01/15/24 85.7 kg (188 lb 15 oz)       LABS:    Recent Labs     04/06/24  0440 04/07/24  0512   WBC 10.2 9.1   HGB 9.9* 10.8*   HCT 31.4* 36.1*    209        Recent Labs     04/05/24  0930 04/06/24  0440 04/07/24  0512   *   < > 128*   K 5.3*   < > 5.5*   CL 85*   < > 93*   CO2 28   < > 21   PHOS 5.0*  --   --    BUN 60*   < > 39*   CREATININE 7.1*   < > 4.8*    < > = values in this interval not displayed.      No results for input(s): \"PROT\", \"INR\", \"APTT\" in the last 72 hours.        LOWER EXTREMITY EXAMINATION    Integument:  Skin is warm, dry and supple, bilateral. Closed surgical incision to the TMA amputation stump with wound edges well coapted, no drainage, all sutures intact, no SOI. Ulcer to the dorsal medial left 2nd toe measuring approximately 1.1 cm x 0.5 cm x 0.1 cm deep. Wound base granular, no probe to bone, no purulence able to be expressed.            Neurologic:  Protective sensation is grossly diminished to light touch at the level of the toes, bilateral.  Vascular:  DP and PT pulses are nonpalpable, bilateral. No significant edema appreciated.  Musculoskeletal:  Patient has 5/5 strength on inversion/everison/dorsiflexion/plantarflexion, bilateral.  No gross musculoskeletal deformities noted. S/p R 
Pt arrived to C4 via bed with O2 4l/nc. Dressing CDI. +2 popletial pulse. Pt reports pain 8 of 10. Dr SUMMER Berg called on cell, called back and VO obtained for Oxycodone 5 mg every 4 hours PRN pain moderate. A Hu NP also contacted prior to MD returned call. Dilaudid PRN order placed and D/c.   
Pt brought to PACU. Report obtained from OR RN and anesthesia. Pt placed on monitor and O2 at 6lpm via nasal cannula. Vitals taken at this time are below:  Vitals:    04/04/24 1910   BP: 137/79   Pulse: 74   Resp: 18   Temp: 97.4 °F (36.3 °C)   SpO2: 100%     Pt is drowsy but easily rousable to voice. Pt answering questions and following commands appropriately. Pt denies complaint of pain at this time. Pt's R foot surgical incision is clean, dry, and intact with betadine soaked adaptic, 4x4 gauze, ABD, webril, kerlix, and ACE wrap in place. Pt's RLE popliteal pulse palpable +3.     
Pt home meds are complete and correct, pt takes all medications he is just not sure when he took them last.   
Pt refusing CHG bath and refusing daily weight. Pt states may do later but \"not right now\". RN will continue to encourage and provided education to pt.   
Pt states that he does not want to wear BIPAP tonight.  
Pt tolerating sips of water without difficulty.   
Pt. Left floor for procedure at 1645.    
Pt. Refusing to get up for daily weight.   
Report called to [facility] given to nurse Gayle, all questions answered. IV and tele removed.    Report given transport, all questions answered. All belongings sent with patient at time of discharge.  
Report called to HAYLEY García. All questions answered at this time. Transportation arrived. Pt transported back to assigned room #427 in stable condition. Called and confirmed portable telemetry monitor with [facility]; [facility] tech says monitor needs batteries. Called and notified HAYLEY García.   
Ron declined to wear Bipap tonight  
Treatment time: 3 hours  Net UF: 0 ml     Pre weight: 96.5 kg  Post weight:96.5 kg    Access used: CVC    Access function: good with  ml/min     Medications or blood products given: na     Regular outpatient schedule: MWF     Summary of response to treatment: Hypotension throughout treatment unable to remove fluid. Patient refused to run full treatment discussed with MD. Copy of dialysis treatment record placed in chart, to be scanned into EMR.  
Treatment time: 3 hours 15 mins     Net UF: 2600    Pre weight: 97.3 kg  Post weight: 94.7 kg  EDW:     Access used: Left chest TDC   Access function: tolerated 350 BFR     Medications or blood products given: Heparin dwells, Retacrit 5k, Zofran 4mg     Regular outpatient schedule: MWF     Summary of response to treatment: Pt c/o Nausea, lightheaded and dizziness last 15 min of HD. Zofran given, pt rinsed back and tx terminated per his request     Copy of dialysis treatment record placed in chart, to be scanned into EMR.  
Treatment time: 3 hours 3 mins  Net UF: 500 ml     Pre weight: 94.3 kg  Post weight:93.8 kg  EDW: TBD kg       Access used: LTDC    Access function: well with BFR well ml/min     Medications or blood products given: Albumin x2 and Retacrit 5000 units     Regular outpatient schedule: MWF     Summary of response to treatment: Patient tolerated treatment fair.  Treatment terminated early due to hypotension.    
Treatment time: 3 hours increased from 2.5 due to fluid overload.  Net UF: 3000 ml     Pre weight: 95 kg  Post weight:92 kg    Access used: ltdc    Access function: well with  ml/min     Medications or blood products given: heparin dwells     Regular outpatient schedule: mwf     Summary of response to treatment: Patient tolerated treatment well and without any complications. Patient remained stable throughout entire treatment and upon the exiting the dialysis suite via transport.     Report given to NAVJOT pina and copy of dialysis treatment record placed in pt's chart to be scanned into EMR.  
Treatment time: 3 hr UF    Net UF: 4000 ml    Pre weight: 98 kg  Post weight: 94 kg  EDW: 94 kg per outpt but challenging to 91.5 kg as tolerated per nephrology     Access used: Lt CW TDC  Access function: Well tolerated, 400 BFR    Medications or blood products given: Heparin dwells    Regular outpatient schedule: MWF    Summary of response to treatment: Pt tolerated well. Pt remained stable throughout entire treatment and upon exiting the hemodialysis suite.     Copy of dialysis treatment record placed in chart, to be scanned into EMR.  
Treatment time: 3.5 hours  Net UF: 3043 ml    Pre weight: 97.5 kg   Post weight: 94.5 kg  EDW: 91.5 kg    Access used: LIJ tunneled line  Access function: good with  ml/min    Medications or blood products given: retacrit 5000 units, heparin dwells    Regular outpatient schedule: [facility]    Summary of response to treatment: Tolerated good.     Cirt Line: Initial Hct: 29.7;   End Profile : 29.7; Refill ( Hct.1 -32.8  subtract Hct 2- 32.4): 0.4 ( no  Refill); BV: --9.5 %    Copy of dialysis treatment record placed in chart, to be scanned into EMR.  
Treatment time: 3.5 hrs    Net UF: 2700 ml    Pre weight: 96.5 kg  Post weight: 93.8 kg  EDW: per outpt clinic 94 kg, challenging as tolerated to 91.5 kg    Access used: Lt CW TDC  Access function: Well tolerated, 350 BFR    Medications or blood products given: Retacrit 5,000 units, Midodrine 10 mg, Heparin dwells    Regular outpatient schedule: MWF    Summary of response to treatment: Pt tolerated well. BP soft. Midodrine given. Pt remained stable throughout entire treatment and upon exiting the hemodialysis suite.    Copy of dialysis treatment record placed in chart, to be scanned into EMR.  
Vascular    CTA reviewed.  Iliac stents patent.  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  Single vessel runoff via peroneal arteries.  I see no disease amenable to intervention.  No need for invasive angiogram.      
Vascular    Vascular study reviewed.  DESTINEY's significantly improved after intervention last May.  Tibial disease is chronic.    Will hold off on Angio today and obtain CTA Aorta with runoff.  Angio can be performed only if treatable disease.    Will resume Eliquis and can eat after CTA   
  COPD/chronic resp failure- on 4L at home  -on prn inhalers     Hx of VTE-per emr, pt has not been compliant so unlikely to be failure of AC  -on eliquis  -checked LE u/s given rt leg pains, noted post tib artery occlusion   -vasc surg consulted, apprec mgmt   -CTA Aorta with runoff done of LE, Iliac stents patent.  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  Single vessel runoff via peroneal arteries. no treatable disease noted(so did not need invasive angiogram)    Hx of CVA- stable  -on plavix and statin     Depression/anxiety- defer to outpt mmgt  -continued buspar and cymbalta     DM2- controlled, last A1c 6.9  -ac/hs bs  -lantus qhs  -ssi medium     ZAINAB- defer to outpt pulm mmgt     Physical Exam Performed:      General appearance:  no apparent distress, appears stated age and cooperative.  HEENT:  Pupils equal, round, and reactive to light. Conjunctivae/corneas clear.  Respiratory:  improved respiratory effort. Clear to auscultation, diminished throughout, bilaterally without Rales/Wheezes/Rhonchi.  Cardiovascular:  Regular rate and rhythm with normal S1/S2 without murmurs, rubs or gallops.  Abdomen:  Soft, non-tender, non-distended with normal bowel sounds.  Musculoskeletal:  No clubbing, cyanosis or edema bilaterally.  Full range of motion without deformity. Rt foot dressing noted  Neurologic:  Neurovascularly intact without any focal sensory/motor deficits. Cranial nerves: II-XII intact, grossly non-focal.  Psychiatric:  Alert and oriented, thought content appropriate, normal insight  Skin:  Skin color, texture, turgor normal.  No rashes or lesions.  Capillary Refill:  Brisk,< 3 seconds   Peripheral Pulses:  +2 palpable, equal bilaterally     /85   Pulse 70   Temp 97.8 °F (36.6 °C) (Oral)   Resp 18   Ht 1.753 m (5' 9\")   Wt 92 kg (202 lb 13.2 oz)   SpO2 94%   BMI 29.95 kg/m²     Diet: ADULT DIET; Regular; Low Sodium (2 gm); Low Potassium (Less than 3000 
Left IJ TDC    Data/  Recent Labs     04/04/24  0650 04/05/24  0930 04/06/24  0440   WBC 6.8 8.6 10.2   HGB 9.8* 10.1* 9.9*   HCT 30.2* 30.9* 31.4*   MCV 87.3 87.3 90.2    261 218       Recent Labs     04/04/24  0454 04/05/24  0930 04/06/24  0440   * 128* 126*   K 5.4* 5.3* 5.5*   CL 87* 85* 89*   CO2 24 28 24   GLUCOSE 124* 256* 184*   PHOS 4.7 5.0*  --    MG  --   --  2.00   BUN 41* 60* 40*   CREATININE 4.9* 7.1* 4.7*   LABGLOM 13* 8* 14*         Assessment/Plan     ESKD.  - On HD MWF at [facility].  - Access: Left IJ TDC.  - Outpatient TW 94kg, challenging as tolerated ~91.5 kgs  - Extra run of dialysis today (4/6) due to hyperkalemia     Hyperkalemia.  - Renal diet stressed again.   - Will adjust K+ bath in HD accordingly.     Anemia.  - Hgb is 9.91 g/dL.  - retacrit 3k units MWF     Hypertensive Urgency.  - On Metoprolol, Valsartan and Torsemide.  - Improving with fluid removal.     Pulmonary Edema.  - Improving with dialysis.  - Fluid and sodium restriction.    Hyponatremia  - FR 1l/day      CKD-MBD.  - Continue calcium acetate.  - Renal diet.     Positive BC.  - 1/2 bottles, Staph epi suspect a contaminant.  - Repeat BC on 3/28/24 is NGTD day 4  (obtained prior to receiving any antibiotic).    Right foot OM of 4th MT head/shaft  - MRI foot 4/3 leading to right foot TMA on 4/4 per podiatry  ____________________________________  Lidia Marcial MD  [facility]  [email or web address]  Office: [phone]    
S1, S2 without m/r/g; no LE edema  RESP: CTA B without w/r/r; breathing wnl  ABD: +bs, soft, nt, no hsm  SKIN: warm, no rashes  ACCESS: Left IJ TDC    Data/  Recent Labs     04/03/24  0800 04/04/24  0650   WBC 7.2 6.8   HGB 9.5* 9.8*   HCT 30.4* 30.2*   MCV 88.5 87.3    219       Recent Labs     04/03/24  0800 04/04/24  0454   * 124*   K 5.5* 5.4*   CL 91* 87*   CO2 23 24   GLUCOSE 95 124*   PHOS 5.4* 4.7   BUN 64* 41*   CREATININE 7.7* 4.9*   LABGLOM 8* 13*         Assessment/Plan     ESKD.  - On HD MWF at [facility].  - Access: Left IJ TDC.  - Outpatient TW 94kg, challenging as tolerated ~91.5 kg if tolerated      Hyperkalemia.  - Specimen hemolyzed   - Renal diet. Stressed again. Lokelma 10 gm po x 1 on 4/4  - Will adjust K+ bath in HD accordingly.     Anemia.  - Hgb is 9.5 g/dL.  - He gets Mircera 100mcg every 2 weeks and Venofer 50mg qweekly.  - retacrit 4k units MWF     Hypertensive Urgency.  - On Metoprolol, Valsartan and Torsemide.  - Improving with fluid removal.     Pulmonary Edema.  - Improving with dialysis.  - Fluid and sodium restriction.    Hyponatremia  Adjust na bath at HD  FR 1l/day after npo is lifted off     CKD-MBD.  - Continue calcium acetate.  - Renal diet.     Positive BC.  - 1/2 bottles, Staph epi suspect a contaminant.  - Repeat BC on 3/28/24 is NGTD day 4  (obtained prior to receiving any antibiotic).    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    
Used for Energy Requirements: Ideal  Energy (kcal/day): 2099-1076 kcals  Weight Used for Protein Requirements: Ideal (1.2-1.5)  Protein (g/day):  g  Method Used for Fluid Requirements: Other (Comment)  Fluid (ml/day): less than 2000 ml/day per CHF guidelines    Nutrition Diagnosis:   Inadequate oral intake related to inadequate protein-energy intake as evidenced by intake 26-50%, intake 51-75%, dialysis    Nutrition Interventions:   Food and/or Nutrient Delivery: Start Oral Diet, Start Oral Nutrition Supplement (as medically feasible)  Nutrition Education/Counseling: Education declined  Coordination of Nutrition Care: Continue to monitor while inpatient       Goals:     Goals: PO intake 75% or greater, prior to discharge       Nutrition Monitoring and Evaluation:   Behavioral-Environmental Outcomes: None Identified  Food/Nutrient Intake Outcomes: Food and Nutrient Intake, Supplement Intake, Diet Advancement/Tolerance  Physical Signs/Symptoms Outcomes: Biochemical Data, Meal Time Behavior, Weight    Discharge Planning:    Too soon to determine     Blaire Foy MS RD LD  Contact: Office: [phone]; Gerardo: [phone]      
pt has not been compliant so unlikely to be failure of AC  -on eliquis  -checked LE u/s given rt leg pains, noted post tib artery occlusion   -vasc surg consulted, apprec mgmt     Hx of CVA- stable  -on plavix and statin     Depression/anxiety- defer to outpt mmgt  -continued buspar and cymbalta     DM2- controlled, last A1c 6.9  -ac/hs bs  -lantus qhs  -ssi medium     ZAINAB- defer to outpt pulm mmgt     Physical Exam Performed:      General appearance:  mild apparent distress, appears stated age and cooperative.  HEENT:  Pupils equal, round, and reactive to light. Conjunctivae/corneas clear.  Respiratory:  improved respiratory effort. Clear to auscultation, diminished throughout, bilaterally without Rales/Wheezes/Rhonchi.  Cardiovascular:  Regular rate and rhythm with normal S1/S2 without murmurs, rubs or gallops.  Abdomen:  Soft, non-tender, non-distended with normal bowel sounds.  Musculoskeletal:  No clubbing, cyanosis or edema bilaterally.  Full range of motion without deformity.  Neurologic:  Neurovascularly intact without any focal sensory/motor deficits. Cranial nerves: II-XII intact, grossly non-focal.  Psychiatric:  Alert and oriented, thought content appropriate, normal insight  Skin:  Skin color, texture, turgor normal.  No rashes or lesions.  Capillary Refill:  Brisk,< 3 seconds   Peripheral Pulses:  +2 palpable, equal bilaterally     BP (!) 174/98   Pulse 78   Temp 97.7 °F (36.5 °C) (Oral)   Resp 18   Ht 1.753 m (5' 9\")   Wt 95.9 kg (211 lb 6.4 oz)   SpO2 100%   BMI 31.22 kg/m²     Diet: ADULT DIET; Regular; 4 carb choices (60 gm/meal)  Diet NPO  DVT Prophylaxis: []PPx LMWH  []SQ Heparin  []IPC/SCDs  [x]Eliquis  []Xarelto  []Coumadin  []Other -      Code status: Full Code  PT/OT Eval Status:   []NOT yet ordered  []Ordered and Pending   [x]Seen with Recommendations for:  []Home independently  []Home w/ assist  [x]HHC  []SNF  []Acute Rehab    Anticipated Discharge Day/Date:  Pending workup, vasc 
rashbret  ACCESS: Left IJ TDC    Data/  Recent Labs     04/10/24  0925 04/10/24  1421 04/11/24  0640   WBC 10.1 9.0 11.7*   HGB 9.5* 10.1* 10.0*   HCT 30.3* 31.4* 30.9*   MCV 87.7 88.0 87.1    242 266       Recent Labs     04/09/24  1440 04/10/24  0925 04/11/24  0617   * 124*  --    K 4.8 5.0  --    CL 88* 86*  --    CO2 25 23  --    GLUCOSE 115* 112*  --    PHOS 4.0  --   --    MG 1.90 2.00 2.10   BUN 48* 59*  --    CREATININE 5.4* 6.7*  --    LABGLOM 12* 9*  --          Assessment/Plan     ESKD.  - On HD MWF at [facility].  - Access: Left IJ TDC.  - Outpatient TW 94kg, reached 93.8 on 4/10, unable to lower further  -next HD tomorrow     Hyperkalemia.  - Renal diet stressed again.        Anemia.  - retacrit 3k units MWF     Hypertension  - On Metoprolol, and entresto   Stable , with occasional intra-dialytic drops        Pulmonary Edema.  - Improved with dialysis.  - Fluid and sodium restriction.    Hyponatremia  - FR 1l/day      CKD-MBD.  - Continue calcium acetate.  - Renal diet.       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    
w/r/r; breathing wnl  ABD: +bs, soft, nt, no hsm  SKIN: warm, no rashes  ACCESS: Left IJ TDC    Data/  Recent Labs     04/03/24  0800   WBC 7.2   HGB 9.5*   HCT 30.4*   MCV 88.5          Recent Labs     04/03/24  0800   *   K 5.5*   CL 91*   CO2 23   GLUCOSE 95   PHOS 5.4*   BUN 64*   CREATININE 7.7*   LABGLOM 8*         Assessment/Plan     ESKD.  - On HD MWF at [facility].  - Access: Left IJ TDC.  - Outpatient TW 94kg, challenging as tolerated ~91.5 kg if tolerated      Hyperkalemia.  - Specimen hemolyzed with repeat wnl.  - Renal diet.  - Will adjust K+ bath in HD accordingly.     Anemia.  - Hgb is 9.5 g/dL.  - He gets Mircera 100mcg every 2 weeks and Venofer 50mg qweekly.  - retacrit 4k units MWF     Hypertensive Urgency.  - On Metoprolol, Valsartan and Torsemide.  - Improving with fluid removal.     Pulmonary Edema.  - Improving with dialysis.  - Fluid and sodium restriction.     CKD-MBD.  - Continue calcium acetate.  - Renal diet.     Positive BC.  - 1/2 bottles, Staph epi suspect a contaminant.  - Repeat BC on 3/28/24 is NGTD (obtained prior to receiving any antibiotic).  - WBC mildly elevated, now wnl. No fever.  - Received Vancomycin after HD on 3/28, 3/29. If repeat BC is negative, would not need to continue. If it comes back positive, plan to continue 2 weeks antibiotic treatment then have TDC exchanged at [facility] outpatient.  - MRI foot 4/3 per podiatry  ____________________________________  Cristine Mckenzie MD  [facility]  [email or web address]  Office: [phone]    
wnl  ABD: +bs, soft, nt, no hsm  SKIN: warm, no rashes  ACCESS: Left IJ TDC    Data/  Recent Labs     04/08/24  0805 04/09/24  1440 04/10/24  0925   WBC 12.1* 12.5* 10.1   HGB 9.8* 9.5* 9.5*   HCT 30.6* 29.8* 30.3*   MCV 88.4 87.8 87.7    255 249       Recent Labs     04/08/24  0805 04/09/24  1440 04/10/24  0925   * 126* 124*   K 5.4* 4.8 5.0   CL 91* 88* 86*   CO2 23 25 23   GLUCOSE 151* 115* 112*   PHOS 4.2 4.0  --    MG 1.90 1.90 2.00   BUN 57* 48* 59*   CREATININE 6.5* 5.4* 6.7*   LABGLOM 9* 12* 9*         Assessment/Plan     ESKD.  - On HD MWF at [facility].  - Access: Left IJ TDC.  - Outpatient TW 94kg, challenging as tolerated ~91.5 kgs; may not tolerate fyrther reduction       Hyperkalemia.  - Renal diet stressed again.        Anemia.  - retacrit 3k units MWF     Hypertension  - On Metoprolol, and entresto        Pulmonary Edema.  - Improving with dialysis.  - Fluid and sodium restriction.    Hyponatremia  - FR 1l/day      CKD-MBD.  - Continue calcium acetate.  - Renal diet.     Positive BC.  - 1/2 bottles, Staph epi suspect a contaminant.  - Repeat BC on 3/28/24 is NGTD day 4  (obtained prior to receiving any antibiotic).    Right foot OM of 4th MT head/shaft  - MRI foot 4/3 leading to right foot TMA on 4/4 per podiatry  -per ID, no need for prolonged course of abx; transition to doxy 100 mg po BD for 7 days  ____________________________________  Shaunna Dobbs MD  [facility]  [email or web address]  Office: [phone]    
Mobility Training: Yes  Supine to Sit: Supervision  Sit to Supine: Supervision  Scooting: Supervision  Transfer Training  Transfer Training: Yes  Sit to Stand: Contact-guard assistance;Adaptive equipment (to RW)  Stand to Sit: Contact-guard assistance;Adaptive equipment (from RW)  Bed to Chair: Other (comment) (declined)      OT Exercises  Exercise Treatment: 3 x 15 BUE/BLE exercises seated at EOB     Safety Devices  Type of Devices: Nurse notified;Patient at risk for falls;Left in bed;Gait belt;Bed alarm in place;Call light within reach  Restraints  Restraints Initially in Place: No     Patient Education  Education Given To: Patient  Education Provided: Role of Therapy;Plan of Care;Precautions;Fall Prevention Strategies  Education Provided Comments: disease specific: importance of use of RED/nurse call light for assist with ADL needs, transfers, positioning; HF education re: fluid restricitions  Education Method: Demonstration;Verbal;Teach Back  Barriers to Learning: None  Education Outcome: Verbalized understanding;Continued education needed    Goals all non-met goals ongoing as of 4/01/2024  Short Term Goals  Time Frame for Short Term Goals: 1 week(4-07-24)  Short Term Goal 1: SBA standing ADL's at sink for 5 minutes by 4-06-24  Short Term Goal 2: modified independent with functional/toilet transfers by 4-07-24  Short Term Goal 3: supervision with bathroom mobility with RW  Short Term Goal 4: tolerate 2 sets of BUE light strengthening exercises to increase transfer skills  Short Term Goal 5: verblaize good understanding of 1800 ml fluid restriction for CHF management; 3/31 STG met  Patient Goals   Patient goals : \" get stronger to take care of myself & my wife\"    AM-PAC - ADL  AM-PAC Daily Activity - Inpatient   How much help is needed for putting on and taking off regular lower body clothing?: A Little  How much help is needed for bathing (which includes washing, rinsing, drying)?: A Little  How much help is 
Co-treatment   Time In 0941         Time Out 1004         Minutes 23         Timed Code Treatment Minutes: 23 Minutes     Rakel Hubbard OT     
interpreted, includes:    [x] Data Review (any 3)  [] Collateral history obtained from:    [x] All available Consultant notes from yesterday/today were reviewed  [x] All current labs were reviewed and interpreted for clinical significance   [x] Appropriate follow-up labs were ordered    Medications:  Personally reviewed in detail in conjunction w/ labs as documented for evidence of drug toxicity.     Infusion Medications    dextrose      sodium chloride       Scheduled Medications    epoetin siddharth-epbx  3,000 Units IntraVENous Once per day on Mon Wed Fri    And    epoetin sdidharth-epbx  2,000 Units IntraVENous Once per day on Mon Wed Fri    calcium carbonate  500 mg Oral Once    calcium acetate  2 capsule Oral TID WC    apixaban  5 mg Oral BID    busPIRone  10 mg Oral TID    DULoxetine  60 mg Oral Daily    insulin glargine  15 Units SubCUTAneous Nightly    metoprolol succinate  25 mg Oral Daily    pravastatin  40 mg Oral Daily    QUEtiapine  50 mg Oral Nightly    sacubitril-valsartan  1 tablet Oral BID    spironolactone  12.5 mg Oral Daily    traZODone  50 mg Oral Nightly    sodium chloride flush  5-40 mL IntraVENous 2 times per day    insulin lispro  0-8 Units SubCUTAneous TID WC    insulin lispro  0-4 Units SubCUTAneous Nightly     PRN Meds: famotidine, ipratropium 0.5 mg-albuterol 2.5 mg, calcium carbonate, loperamide, glucose, dextrose bolus **OR** dextrose bolus, glucagon (rDNA), dextrose, sodium chloride flush, sodium chloride, ondansetron **OR** ondansetron, polyethylene glycol, acetaminophen **OR** acetaminophen, oxyCODONE-acetaminophen, heparin (porcine)     Labs:  Personally reviewed and interpreted for clinical significance.     Recent Labs     04/03/24  0800   WBC 7.2   HGB 9.5*   HCT 30.4*        Recent Labs     04/03/24  0800   *   K 5.5*   CL 91*   CO2 23   BUN 64*   CREATININE 7.7*   CALCIUM 9.0   PHOS 5.4*     Recent Labs     04/02/24  0542   PROBNP 51,699*     No results for input(s): 
transfers with (I) - MET 4/02/24  Patient Goals   Patient Goals : \"To get stronger\"    Education  Patient Education  Education Given To: Patient  Education Provided: Role of Therapy;Plan of Care;Transfer Training;Fall Prevention Strategies;Precautions;Equipment  Education Method: Demonstration;Verbal  Barriers to Learning: None  Education Outcome: Verbalized understanding;Demonstrated understanding    AM-PAC - Mobility    AM-PAC Basic Mobility - Inpatient   How much help is needed turning from your back to your side while in a flat bed without using bedrails?: None  How much help is needed moving from lying on your back to sitting on the side of a flat bed without using bedrails?: None  How much help is needed moving to and from a bed to a chair?: None  How much help is needed standing up from a chair using your arms?: None  How much help is needed walking in hospital room?: None  How much help is needed climbing 3-5 steps with a railing?: A Little  AM-PAC Inpatient Mobility Raw Score : 23  AM-PAC Inpatient T-Scale Score : 56.93  Mobility Inpatient CMS 0-100% Score: 11.2  Mobility Inpatient CMS G-Code Modifier : CI         Therapy Time   Individual Concurrent Group Co-treatment   Time In 1150         Time Out 1215         Minutes 25         Timed Code Treatment Minutes: 25 Minutes       Katalina Gomez, PT, DPT #[id number]        
  Component Value Date/Time    BLOODCULT2  03/27/2024 03:41 AM     No Growth to date.  Any change in status will be called.     Organism:   Lab Results   Component Value Date/Time    ORG Staphylococcus epidermidis DNA Detected 03/27/2024 03:41 AM    ORG Staphylococcus epidermidis 03/27/2024 03:41 AM         Akash Lo MD  
5.5*   CL 87* 85* 89*   CO2 24 28 24   BUN 41* 60* 40*   CREATININE 4.9* 7.1* 4.7*   CALCIUM 8.6 8.8 8.9   MG  --   --  2.00   PHOS 4.7 5.0*  --      Recent Labs     04/05/24  0930   PROBNP 42,679*     No results for input(s): \"LABA1C\" in the last 72 hours.  No results for input(s): \"AST\", \"ALT\", \"BILIDIR\", \"BILITOT\", \"ALKPHOS\" in the last 72 hours.  No results for input(s): \"INR\", \"LACTA\", \"TSH\" in the last 72 hours.    Urine Cultures:   Lab Results   Component Value Date/Time    LABURIN No growth at 18 to 36 hours 05/29/2022 12:51 PM     Blood Cultures:   Lab Results   Component Value Date/Time    BC No Growth after 4 days of incubation. 03/28/2024 11:40 AM     Lab Results   Component Value Date/Time    BLOODCULT2 No Growth after 4 days of incubation. 03/27/2024 03:41 AM     Organism:   Lab Results   Component Value Date/Time    ORG Staphylococcus epidermidis DNA Detected 03/27/2024 03:41 AM    ORG Staphylococcus epidermidis 03/27/2024 03:41 AM         Ciro Spencer MD  
after 4 days of incubation. 03/28/2024 11:40 AM     Lab Results   Component Value Date/Time    BLOODCULT2 No Growth after 4 days of incubation. 03/27/2024 03:41 AM     Organism:   Lab Results   Component Value Date/Time    ORG Staph aureus MRSA 04/04/2024 06:22 PM         Akash Lo MD

## 2024-04-11 NOTE — CARE COORDINATION
CASE MANAGEMENT DISCHARGE SUMMARY      Discharge to: [facility];LT    Precertification completed: LTC completed  Hospital Exemption Notification (HENS) completed: Pass completed     IMM given: 4/10/24    New Durable Medical Equipment ordered/agency: n/a    Transportation:    Family/car:n/a   Medical Transport explained to pt/family. Pt/family voice no agency preference.        Confirmed discharge plan with:     Patient: yes     Family:  Crystal [phone]     Facility/Agency, name:  [facility] faxed/Electronic. Call placed to Agueda at [facility].    Phone number for report to facility:      RN, name: Eugene     Note: Discharging nurse to complete [facility], reconcile AVS, and place final copy with patient's discharge packet. RN to ensure that written prescriptions for  Level II medications are sent with patient to the facility as per protocol.        
[facility] is following and will take patient at d/c but will nee pre-cert for skilled. Need updated therapy notes.   
CM update; LOS # 2; Followed by IM, Nephrology, PT/OT and Wound Care. Patient off the floor for dialysis and could not be assessed by PT/OT. These notes are need for insurance approval for SNF. I I have updated Agueda at [facility] of this. Will follow.Belkis Mcdaniel RN    
Chart reviewed day 12. Pt is followed by IM, ID, Nephro and Pod. HD today. Per ID pending path, continue IV vanc for now and if negative transition to oral vs if positive IV vanc for 4-6 weeks at HD. Pt w/pain requiring IV pain meds. Plan to d/c to [facility] LTC, if skilled needed will move forward w/that once at facility. HD at [facility]. Will follow for needs as they arise. Electronically signed by BLAIR BENNETT RN on 4/8/2024 at 1:38 PM    
Chart reviewed day 13. Pt is followed by IM, ID, Pod and Nephro. Per ID pt can transition to PO abx. Anticipate pt to d/c tomorrow to [facility] LTC? Will follow for needs as they arise. Electronically signed by BLAIR BENNETT RN on 4/9/2024 at 3:29 PM    
D/c canceled d/t bp issues. Will d/c tomorrow.       CASE MANAGEMENT DISCHARGE SUMMARY      Discharge to: [facility]    Precertification completed: n/a  Hospital Exemption Notification (HENS) completed: n/a    IMM given: 4/10/24    New Durable Medical Equipment ordered/agency: n/a    Transportation:    Medical Transport explained to pt/family. Pt/family voice no agency preference.        Confirmed discharge plan with:     Patient: yes     Family: Crystal [phone]     Facility/Agency, name:  [facility] faxed/Electronic.Confirmed w/Agueda at facility.   Phone number for report to facility: [phone]      RN, name: Cassidy     Note: Discharging nurse to complete [facility], reconcile AVS, and place final copy with patient's discharge packet. RN to ensure that written prescriptions for  Level II medications are sent with patient to the facility as per protocol.          
Now getting further work with podiatry and may need amputation related gangrene right foot. Likely will require R TMA per podiatry. At present does not meet for skilled rehab under Medicare guidelines with an AMPAC of 23. However he will need an updated therapy eval post op if gets TMA. At present Agueda at [facility] has submitted a long term care auth request to [employer] and myriamarr has been completed in [facility] system/ plan will be for [facility] at discharge.   
Patient getting TMA rt foot today. ESRD. Plan for [facility] LTC / Agueda has submitted auth with [employer]. Not currently qualifying for skilled. Will need updated PT/OT after TMA and he may then meet criteria for SNF. [employer] Medicare is listed as primary here but it actually changed to [place] Medicare 4/1. It will stay under [employer] since he was admitted in March here at the hospital. If meets for SNF would need [employer] cert for skilled at [facility] however, plan currently is to admit to [facility] under [employer] long term care auth. Agueda states they can always skill him once he is there. He is normally from home with spouse and outpt HD at [facility]. However he is not managing well at home and states his wife is unable to care for him too at times.   
Per Dr. Rosario no invasive angio planned. ESRD normally from home with spouse and goes to [facility]. However plan is for [facility]. Per Agueda they can accept and need [employer] pre-cert. His insurance changed to [employer] 4/1 but will remain under [employer] for this hospital stay since he was admitted in March. Need updated PT notes for pre-cert. MD states here 1 to 2 more days.     Patient is doing well with PT and likely will not qualify for skilled care under his Medicare. Agueda at [facility] states they will start pre-cert for skilled and if denied then they will try to get a long term care auth with [employer].   
Spoke with patient in room and he confirms he does want to go to facility and is agreeable to [facility]. Per Agueda dialysis is ok taking him back / still need PT/OT to see if he can be skilled . If not they will bring him in under his Medicaid. Currently he gets HD at [facility] but patient states it is getting too much for him and his wife to manage at home.  
TMA of right foot yesterday. Pathology pending per podiatry op note and will determine if patient needs any IVABX's or not. Need post op PT/OT orders . If needs IVABX's and PT/OT rec's change post amputation then he may meet skilled criteria . Currently not meeting with therapy previous lydia and Ag[facility]a in admissions submitted auth with secondary [facility] plan for LTC auth. HD ESRD. Normally from home with spouse and [facility] outpt. Familiar to CM on a care plan and has frequent admissions. Patient states wife cannot take care of him and he is not managing well at home and he wants to to [place] where he has been before . CM following for d/c planning.    Per Agueda long term care auth not needed and pasarr done. Ok to come under [facility] when ready per Agueda.   
Patient lives at home with wife.  Patient has been to [facility] in past.  Patient agreeable to go to SNF on d/c.  Referral made. No other needs at this time.     The Plan for Transition of Care is related to the following treatment goals of Acute on chronic systolic CHF (congestive heart failure) (HCC) [I50.23]    IF APPLICABLE: The Patient and/or patient representative Igor and his family were provided with a choice of provider and agrees with the discharge plan. Freedom of choice list with basic dialogue that supports the patient's individualized plan of care/goals and shares the quality data associated with the providers was provided to: (P) Patient   Patient Representative Name:       The Patient and/or Patient Representative Agree with the Discharge Plan? (P) Yes    NINO Galvin, SALVADOR-S  Case Management Department  Ph: [phone] Fax: [phone]

## 2024-04-11 NOTE — PLAN OF CARE
Problem: Pain  Goal: Verbalizes/displays adequate comfort level or baseline comfort level  3/28/2024 2036 by Elsy Meier RN  Outcome: Progressing  3/28/2024 2036 by Elsy Meier RN  Outcome: Progressing     Problem: Discharge Planning  Goal: Discharge to home or other facility with appropriate resources  3/28/2024 2036 by Elsy Meier RN  Outcome: Progressing  3/28/2024 2036 by Elsy Meier RN  Outcome: Progressing     Problem: ABCDS Injury Assessment  Goal: Absence of physical injury  3/28/2024 2036 by Elsy Meier RN  Outcome: Progressing  3/28/2024 2036 by Elsy Meier RN  Outcome: Progressing     Problem: Chronic Conditions and Co-morbidities  Goal: Patient's chronic conditions and co-morbidity symptoms are monitored and maintained or improved  3/28/2024 2036 by Elsy Meier RN  Outcome: Progressing  3/28/2024 2036 by Elsy Meier RN  Outcome: Progressing     Problem: Skin/Tissue Integrity  Goal: Absence of new skin breakdown  Description: 1.  Monitor for areas of redness and/or skin breakdown  2.  Assess vascular access sites hourly  3.  Every 4-6 hours minimum:  Change oxygen saturation probe site  4.  Every 4-6 hours:  If on nasal continuous positive airway pressure, respiratory therapy assess nares and determine need for appliance change or resting period.  3/28/2024 2036 by Elsy Meier RN  Outcome: Progressing  3/28/2024 2036 by Elsy Meier RN  Outcome: Progressing     
  Problem: Pain  Goal: Verbalizes/displays adequate comfort level or baseline comfort level  3/30/2024 1442 by Sabrina Barger RN  Outcome: Progressing  3/30/2024 0802 by Kari Whitman RN  Outcome: Progressing     Problem: ABCDS Injury Assessment  Goal: Absence of physical injury  3/30/2024 1442 by Sabrina Barger RN  Outcome: Progressing  3/30/2024 0802 by Kari Whitman RN  Outcome: Progressing     Problem: Chronic Conditions and Co-morbidities  Goal: Patient's chronic conditions and co-morbidity symptoms are monitored and maintained or improved  3/30/2024 1442 by Sabrina Barger RN  Outcome: Progressing  3/30/2024 0802 by Kari Whitman RN  Outcome: Progressing  Flowsheets (Taken 3/29/2024 2326)  Care Plan - Patient's Chronic Conditions and Co-Morbidity Symptoms are Monitored and Maintained or Improved: Monitor and assess patient's chronic conditions and comorbid symptoms for stability, deterioration, or improvement     Problem: Skin/Tissue Integrity  Goal: Absence of new skin breakdown  Description: 1.  Monitor for areas of redness and/or skin breakdown  2.  Assess vascular access sites hourly  3.  Every 4-6 hours minimum:  Change oxygen saturation probe site  4.  Every 4-6 hours:  If on nasal continuous positive airway pressure, respiratory therapy assess nares and determine need for appliance change or resting period.  Outcome: Progressing     Problem: Safety - Adult  Goal: Free from fall injury  Outcome: Progressing     
  Problem: Pain  Goal: Verbalizes/displays adequate comfort level or baseline comfort level  4/2/2024 2037 by Kane Lord RN  Outcome: Progressing  4/2/2024 2023 by Elsy Meier RN  Outcome: Progressing     Problem: Discharge Planning  Goal: Discharge to home or other facility with appropriate resources  4/2/2024 2037 by Kane Lord RN  Outcome: Progressing  4/2/2024 2023 by Elsy Meier RN  Outcome: Progressing     Problem: ABCDS Injury Assessment  Goal: Absence of physical injury  4/2/2024 2037 by Kane Lord RN  Outcome: Progressing  4/2/2024 2023 by Elsy Meier RN  Outcome: Progressing     Problem: Chronic Conditions and Co-morbidities  Goal: Patient's chronic conditions and co-morbidity symptoms are monitored and maintained or improved  4/2/2024 2037 by Kane Lord RN  Outcome: Progressing  4/2/2024 2023 by Elsy Meier RN  Outcome: Progressing     Problem: Skin/Tissue Integrity  Goal: Absence of new skin breakdown  Description: 1.  Monitor for areas of redness and/or skin breakdown  2.  Assess vascular access sites hourly  3.  Every 4-6 hours minimum:  Change oxygen saturation probe site  4.  Every 4-6 hours:  If on nasal continuous positive airway pressure, respiratory therapy assess nares and determine need for appliance change or resting period.  4/2/2024 2037 by Kane Lord RN  Outcome: Progressing  4/2/2024 2023 by Elsy Meier RN  Outcome: Progressing     Problem: Safety - Adult  Goal: Free from fall injury  4/2/2024 2037 by Kane Lord RN  Outcome: Progressing  4/2/2024 2023 by Elsy Meier RN  Outcome: Progressing     Problem: Respiratory - Adult  Goal: Achieves optimal ventilation and oxygenation  4/2/2024 2037 by Kane Lord RN  Outcome: Progressing  4/2/2024 2023 by Elsy Meier RN  Outcome: Progressing     Problem: Cardiovascular - Adult  Goal: Maintains optimal cardiac output and hemodynamic stability  4/2/2024 2037 by Kane Lord 
  Problem: Pain  Goal: Verbalizes/displays adequate comfort level or baseline comfort level  4/3/2024 2332 by Raquel Tong, RN  Outcome: Progressing  Flowsheets (Taken 4/3/2024 2146)  Verbalizes/displays adequate comfort level or baseline comfort level:   Encourage patient to monitor pain and request assistance   Assess pain using appropriate pain scale   Administer analgesics based on type and severity of pain and evaluate response   Implement non-pharmacological measures as appropriate and evaluate response   Consider cultural and social influences on pain and pain management   Notify Licensed Independent Practitioner if interventions unsuccessful or patient reports new pain  4/3/2024 1445 by Lou Russell, RN  Outcome: Progressing     Problem: Discharge Planning  Goal: Discharge to home or other facility with appropriate resources  4/3/2024 1445 by Lou Russell, RN  Outcome: Progressing  Flowsheets (Taken 3/29/2024 2326 by Kari Whitman, RN)  Discharge to home or other facility with appropriate resources: Identify barriers to discharge with patient and caregiver  Note:   CHF Care Plan      Patient's EF (Ejection Fraction) is less than 40%    Heart Failure Medications:  Diuretics:: Spironolactone    (One of the following REQUIRED for EF </= 40%/SYSTOLIC FAILURE but MAY be used in EF% >40%/DIASTOLIC FAILURE)        ACE:: None        ARB:: None         ARNI:: Sacubitril/Valsartan-Entresto    (Beta Blockers)  NON- Evidenced Based Beta Blocker (for EF% >40%/DIASTOLIC FAILURE): Metoprolol TARTrate- Lopressor    Evidenced Based Beta Blocker::(REQUIRED for EF% <40%/SYSTOLIC FAILURE) Metoprolol SUCCinate- Toprol XL  ...................................................................................................................................................    Failed to redirect to the Timeline version of the [facility] SmartLink.      Patient's weights and intake/output reviewed    Daily Weight log at bedside, 
  Problem: Pain  Goal: Verbalizes/displays adequate comfort level or baseline comfort level  4/5/2024 0151 by Raquel Tong, RN  Outcome: Progressing  Flowsheets  Taken 4/4/2024 2345  Verbalizes/displays adequate comfort level or baseline comfort level:   Encourage patient to monitor pain and request assistance   Assess pain using appropriate pain scale   Administer analgesics based on type and severity of pain and evaluate response   Implement non-pharmacological measures as appropriate and evaluate response   Consider cultural and social influences on pain and pain management   Notify Licensed Independent Practitioner if interventions unsuccessful or patient reports new pain  Taken 4/4/2024 2045  Verbalizes/displays adequate comfort level or baseline comfort level:   Encourage patient to monitor pain and request assistance   Assess pain using appropriate pain scale   Administer analgesics based on type and severity of pain and evaluate response   Implement non-pharmacological measures as appropriate and evaluate response   Consider cultural and social influences on pain and pain management   Notify Licensed Independent Practitioner if interventions unsuccessful or patient reports new pain  4/4/2024 1907 by Brigida Riley, RN  Outcome: Progressing  Flowsheets  Taken 4/4/2024 1630  Verbalizes/displays adequate comfort level or baseline comfort level:   Encourage patient to monitor pain and request assistance   Assess pain using appropriate pain scale  Taken 4/4/2024 1215  Verbalizes/displays adequate comfort level or baseline comfort level:   Encourage patient to monitor pain and request assistance   Assess pain using appropriate pain scale  Taken 4/4/2024 1145  Verbalizes/displays adequate comfort level or baseline comfort level:   Encourage patient to monitor pain and request assistance   Assess pain using appropriate pain scale   Administer analgesics based on type and severity of pain and evaluate response   
  Problem: Pain  Goal: Verbalizes/displays adequate comfort level or baseline comfort level  4/8/2024 2152 by Parker Mckeon RN  Outcome: Progressing  4/8/2024 1201 by Lauren Nuñez RN  Outcome: Progressing     Problem: Discharge Planning  Goal: Discharge to home or other facility with appropriate resources  4/8/2024 2152 by Parker Mckeon RN  Outcome: Progressing  4/8/2024 1201 by Lauren Nuñez RN  Outcome: Progressing     Problem: ABCDS Injury Assessment  Goal: Absence of physical injury  4/8/2024 2152 by Parker Mckeon RN  Outcome: Progressing  4/8/2024 1201 by Lauren Nuñez RN  Outcome: Progressing     Problem: Chronic Conditions and Co-morbidities  Goal: Patient's chronic conditions and co-morbidity symptoms are monitored and maintained or improved  4/8/2024 2152 by Parker Mckeon RN  Outcome: Progressing  4/8/2024 1201 by Lauren Nuñez RN  Outcome: Progressing     Problem: Skin/Tissue Integrity  Goal: Absence of new skin breakdown  Description: 1.  Monitor for areas of redness and/or skin breakdown  2.  Assess vascular access sites hourly  3.  Every 4-6 hours minimum:  Change oxygen saturation probe site  4.  Every 4-6 hours:  If on nasal continuous positive airway pressure, respiratory therapy assess nares and determine need for appliance change or resting period.  4/8/2024 2152 by Parker Mckeon RN  Outcome: Progressing  4/8/2024 1201 by Lauren Nuñez RN  Outcome: Progressing     Problem: Safety - Adult  Goal: Free from fall injury  4/8/2024 2152 by Parker Mckeon RN  Outcome: Progressing  4/8/2024 1201 by Lauren Nuñez RN  Outcome: Progressing     Problem: Respiratory - Adult  Goal: Achieves optimal ventilation and oxygenation  4/8/2024 2152 by Parker Mckeon RN  Outcome: Progressing  4/8/2024 1201 by Lauren Nuñez RN  Outcome: Progressing     Problem: Cardiovascular - Adult  Goal: Maintains optimal cardiac output and hemodynamic stability  4/8/2024 
  Problem: Pain  Goal: Verbalizes/displays adequate comfort level or baseline comfort level  Outcome: Progressing     Problem: ABCDS Injury Assessment  Goal: Absence of physical injury  Outcome: Progressing     Problem: Safety - Adult  Goal: Free from fall injury  Outcome: Progressing     
  Problem: Pain  Goal: Verbalizes/displays adequate comfort level or baseline comfort level  Outcome: Progressing     Problem: Discharge Planning  Goal: Discharge to home or other facility with appropriate resources  Outcome: Progressing     Problem: ABCDS Injury Assessment  Goal: Absence of physical injury  Outcome: Progressing     Problem: Chronic Conditions and Co-morbidities  Goal: Patient's chronic conditions and co-morbidity symptoms are monitored and maintained or improved  Outcome: Progressing     
  Problem: Pain  Goal: Verbalizes/displays adequate comfort level or baseline comfort level  Outcome: Progressing     Problem: Discharge Planning  Goal: Discharge to home or other facility with appropriate resources  Outcome: Progressing     Problem: ABCDS Injury Assessment  Goal: Absence of physical injury  Outcome: Progressing     Problem: Chronic Conditions and Co-morbidities  Goal: Patient's chronic conditions and co-morbidity symptoms are monitored and maintained or improved  Outcome: Progressing     Problem: Skin/Tissue Integrity  Goal: Absence of new skin breakdown  Description: 1.  Monitor for areas of redness and/or skin breakdown  2.  Assess vascular access sites hourly  3.  Every 4-6 hours minimum:  Change oxygen saturation probe site  4.  Every 4-6 hours:  If on nasal continuous positive airway pressure, respiratory therapy assess nares and determine need for appliance change or resting period.  Outcome: Progressing     Problem: Safety - Adult  Goal: Free from fall injury  Outcome: Progressing     
  Problem: Pain  Goal: Verbalizes/displays adequate comfort level or baseline comfort level  Outcome: Progressing     Problem: Discharge Planning  Goal: Discharge to home or other facility with appropriate resources  Outcome: Progressing     Problem: ABCDS Injury Assessment  Goal: Absence of physical injury  Outcome: Progressing     Problem: Chronic Conditions and Co-morbidities  Goal: Patient's chronic conditions and co-morbidity symptoms are monitored and maintained or improved  Outcome: Progressing     Problem: Skin/Tissue Integrity  Goal: Absence of new skin breakdown  Description: 1.  Monitor for areas of redness and/or skin breakdown  2.  Assess vascular access sites hourly  3.  Every 4-6 hours minimum:  Change oxygen saturation probe site  4.  Every 4-6 hours:  If on nasal continuous positive airway pressure, respiratory therapy assess nares and determine need for appliance change or resting period.  Outcome: Progressing     Problem: Safety - Adult  Goal: Free from fall injury  Outcome: Progressing     Problem: Respiratory - Adult  Goal: Achieves optimal ventilation and oxygenation  Outcome: Progressing     Problem: Cardiovascular - Adult  Goal: Maintains optimal cardiac output and hemodynamic stability  4/1/2024 1816 by Elsy Meier RN  Outcome: Progressing  4/1/2024 0607 by Angelika Foreman RN  Outcome: Progressing  Note:   CHF Care Plan      Patient's EF (Ejection Fraction) is less than 40%    Heart Failure Medications:  Diuretics:: Spironolactone    (One of the following REQUIRED for EF </= 40%/SYSTOLIC FAILURE but MAY be used in EF% >40%/DIASTOLIC FAILURE)        ACE:: None        ARB:: None         ARNI:: Sacubitril/Valsartan-Entresto    (Beta Blockers)  NON- Evidenced Based Beta Blocker (for EF% >40%/DIASTOLIC FAILURE): None    Evidenced Based Beta Blocker::(REQUIRED for EF% <40%/SYSTOLIC FAILURE) Metoprolol SUCCinate- Toprol 
  Problem: Pain  Goal: Verbalizes/displays adequate comfort level or baseline comfort level  Outcome: Progressing     Problem: Discharge Planning  Goal: Discharge to home or other facility with appropriate resources  Outcome: Progressing     Problem: ABCDS Injury Assessment  Goal: Absence of physical injury  Outcome: Progressing     Problem: Chronic Conditions and Co-morbidities  Goal: Patient's chronic conditions and co-morbidity symptoms are monitored and maintained or improved  Outcome: Progressing     Problem: Skin/Tissue Integrity  Goal: Absence of new skin breakdown  Description: 1.  Monitor for areas of redness and/or skin breakdown  2.  Assess vascular access sites hourly  3.  Every 4-6 hours minimum:  Change oxygen saturation probe site  4.  Every 4-6 hours:  If on nasal continuous positive airway pressure, respiratory therapy assess nares and determine need for appliance change or resting period.  Outcome: Progressing     Problem: Safety - Adult  Goal: Free from fall injury  Outcome: Progressing     Problem: Respiratory - Adult  Goal: Achieves optimal ventilation and oxygenation  Outcome: Progressing     Problem: Cardiovascular - Adult  Goal: Maintains optimal cardiac output and hemodynamic stability  Outcome: Progressing     Problem: Infection - Adult  Goal: Absence of infection during hospitalization  Outcome: Progressing     Problem: Metabolic/Fluid and Electrolytes - Adult  Goal: Glucose maintained within prescribed range  Outcome: Progressing     
  Problem: Pain  Goal: Verbalizes/displays adequate comfort level or baseline comfort level  Outcome: Progressing     Problem: Discharge Planning  Goal: Discharge to home or other facility with appropriate resources  Outcome: Progressing     Problem: ABCDS Injury Assessment  Goal: Absence of physical injury  Outcome: Progressing     Problem: Chronic Conditions and Co-morbidities  Goal: Patient's chronic conditions and co-morbidity symptoms are monitored and maintained or improved  Outcome: Progressing     Problem: Skin/Tissue Integrity  Goal: Absence of new skin breakdown  Description: 1.  Monitor for areas of redness and/or skin breakdown  2.  Assess vascular access sites hourly  3.  Every 4-6 hours minimum:  Change oxygen saturation probe site  4.  Every 4-6 hours:  If on nasal continuous positive airway pressure, respiratory therapy assess nares and determine need for appliance change or resting period.  Outcome: Progressing     Problem: Safety - Adult  Goal: Free from fall injury  Outcome: Progressing     Problem: Respiratory - Adult  Goal: Achieves optimal ventilation and oxygenation  Outcome: Progressing     Problem: Cardiovascular - Adult  Goal: Maintains optimal cardiac output and hemodynamic stability  Outcome: Progressing     Problem: Infection - Adult  Goal: Absence of infection during hospitalization  Outcome: Progressing     Problem: Metabolic/Fluid and Electrolytes - Adult  Goal: Glucose maintained within prescribed range  Outcome: Progressing     
  Problem: Pain  Goal: Verbalizes/displays adequate comfort level or baseline comfort level  Outcome: Progressing     Problem: Discharge Planning  Goal: Discharge to home or other facility with appropriate resources  Outcome: Progressing     Problem: ABCDS Injury Assessment  Goal: Absence of physical injury  Outcome: Progressing     Problem: Chronic Conditions and Co-morbidities  Goal: Patient's chronic conditions and co-morbidity symptoms are monitored and maintained or improved  Outcome: Progressing     Problem: Skin/Tissue Integrity  Goal: Absence of new skin breakdown  Description: 1.  Monitor for areas of redness and/or skin breakdown  2.  Assess vascular access sites hourly  3.  Every 4-6 hours minimum:  Change oxygen saturation probe site  4.  Every 4-6 hours:  If on nasal continuous positive airway pressure, respiratory therapy assess nares and determine need for appliance change or resting period.  Outcome: Progressing     Problem: Safety - Adult  Goal: Free from fall injury  Outcome: Progressing     Problem: Respiratory - Adult  Goal: Achieves optimal ventilation and oxygenation  Outcome: Progressing     Problem: Cardiovascular - Adult  Goal: Maintains optimal cardiac output and hemodynamic stability  Outcome: Progressing     Problem: Infection - Adult  Goal: Absence of infection during hospitalization  Outcome: Progressing     Problem: Metabolic/Fluid and Electrolytes - Adult  Goal: Glucose maintained within prescribed range  Outcome: Progressing     Problem: Nutrition Deficit:  Goal: Optimize nutritional status  Outcome: Progressing     
  Problem: Pain  Goal: Verbalizes/displays adequate comfort level or baseline comfort level  Outcome: Progressing  Flowsheets  Taken 4/4/2024 1630  Verbalizes/displays adequate comfort level or baseline comfort level:   Encourage patient to monitor pain and request assistance   Assess pain using appropriate pain scale  Taken 4/4/2024 1215  Verbalizes/displays adequate comfort level or baseline comfort level:   Encourage patient to monitor pain and request assistance   Assess pain using appropriate pain scale  Taken 4/4/2024 1145  Verbalizes/displays adequate comfort level or baseline comfort level:   Encourage patient to monitor pain and request assistance   Assess pain using appropriate pain scale   Administer analgesics based on type and severity of pain and evaluate response   Implement non-pharmacological measures as appropriate and evaluate response     Problem: Discharge Planning  Goal: Discharge to home or other facility with appropriate resources  Outcome: Progressing     Problem: ABCDS Injury Assessment  Goal: Absence of physical injury  Outcome: Progressing     Problem: Chronic Conditions and Co-morbidities  Goal: Patient's chronic conditions and co-morbidity symptoms are monitored and maintained or improved  Outcome: Progressing  Flowsheets  Taken 4/4/2024 1630  Care Plan - Patient's Chronic Conditions and Co-Morbidity Symptoms are Monitored and Maintained or Improved:   Collaborate with multidisciplinary team to address chronic and comorbid conditions and prevent exacerbation or deterioration   Update acute care plan with appropriate goals if chronic or comorbid symptoms are exacerbated and prevent overall improvement and discharge  Taken 4/4/2024 1145  Care Plan - Patient's Chronic Conditions and Co-Morbidity Symptoms are Monitored and Maintained or Improved:   Monitor and assess patient's chronic conditions and comorbid symptoms for stability, deterioration, or improvement   Collaborate with 
  Problem: Pain  Goal: Verbalizes/displays adequate comfort level or baseline comfort level  Outcome: Progressing  Flowsheets (Taken 4/10/2024 2100)  Verbalizes/displays adequate comfort level or baseline comfort level:   Encourage patient to monitor pain and request assistance   Assess pain using appropriate pain scale   Administer analgesics based on type and severity of pain and evaluate response   Implement non-pharmacological measures as appropriate and evaluate response     Problem: Discharge Planning  Goal: Discharge to home or other facility with appropriate resources  Outcome: Progressing  Flowsheets (Taken 4/10/2024 1950)  Discharge to home or other facility with appropriate resources:   Identify barriers to discharge with patient and caregiver   Arrange for needed discharge resources and transportation as appropriate   Identify discharge learning needs (meds, wound care, etc)     Problem: ABCDS Injury Assessment  Goal: Absence of physical injury  Outcome: Progressing     Problem: Chronic Conditions and Co-morbidities  Goal: Patient's chronic conditions and co-morbidity symptoms are monitored and maintained or improved  Outcome: Progressing  Flowsheets (Taken 4/10/2024 1950)  Care Plan - Patient's Chronic Conditions and Co-Morbidity Symptoms are Monitored and Maintained or Improved:   Monitor and assess patient's chronic conditions and comorbid symptoms for stability, deterioration, or improvement   Collaborate with multidisciplinary team to address chronic and comorbid conditions and prevent exacerbation or deterioration   Update acute care plan with appropriate goals if chronic or comorbid symptoms are exacerbated and prevent overall improvement and discharge     Problem: Skin/Tissue Integrity  Goal: Absence of new skin breakdown  Description: 1.  Monitor for areas of redness and/or skin breakdown  2.  Assess vascular access sites hourly  3.  Every 4-6 hours minimum:  Change oxygen saturation probe 
  Problem: Pain  Goal: Verbalizes/displays adequate comfort level or baseline comfort level  Outcome: Progressing  Pt scoring pain on 0-10 scale. Pain medications given per MAR. Pt instructed to call out when pain level increasing. Call light within reach.      Problem: Discharge Planning  Goal: Discharge to home or other facility with appropriate resources  Outcome: Progressing     Problem: ABCDS Injury Assessment  Goal: Absence of physical injury  Outcome: Progressing     Problem: Chronic Conditions and Co-morbidities  Goal: Patient's chronic conditions and co-morbidity symptoms are monitored and maintained or improved  Outcome: Progressing     Problem: Skin/Tissue Integrity  Goal: Absence of new skin breakdown  Description: 1.  Monitor for areas of redness and/or skin breakdown  2.  Assess vascular access sites hourly  3.  Every 4-6 hours minimum:  Change oxygen saturation probe site  4.  Every 4-6 hours:  If on nasal continuous positive airway pressure, respiratory therapy assess nares and determine need for appliance change or resting period.  Outcome: Progressing     Problem: Safety - Adult  Goal: Free from fall injury  Outcome: Progressing  Bed in lowest position, wheels locked, 2/4 side rails up, nonskid footwear on. Bed/ chair check alarm in place, call light within reach. Pt instructed to call out when needing assistance. Pt stated understanding.      Problem: Respiratory - Adult  Goal: Achieves optimal ventilation and oxygenation  Outcome: Progressing     Problem: Cardiovascular - Adult  Goal: Maintains optimal cardiac output and hemodynamic stability  Outcome: Progressing     Problem: Infection - Adult  Goal: Absence of infection during hospitalization  Outcome: Progressing     Problem: Metabolic/Fluid and Electrolytes - Adult  Goal: Glucose maintained within prescribed range  Outcome: Progressing     Problem: Nutrition Deficit:  Goal: Optimize nutritional status  Outcome: Progressing     
CHF Care Plan      Patient's EF (Ejection Fraction) is less than 40%    Heart Failure Medications:  Diuretics:: None    (One of the following REQUIRED for EF </= 40%/SYSTOLIC FAILURE but MAY be used in EF% >40%/DIASTOLIC FAILURE)        ACE:: None        ARB:: None         ARNI:: None    (Beta Blockers)  NON- Evidenced Based Beta Blocker (for EF% >40%/DIASTOLIC FAILURE): None    Evidenced Based Beta Blocker::(REQUIRED for EF% <40%/SYSTOLIC FAILURE) Metoprolol SUCCinate- Toprol XL  ...................................................................................................................................................    Failed to redirect to the Timeline version of the [employer] SmartLink.      Patient's weights and intake/output reviewed    Daily Weight log at bedside, patient/family participation in use of log: \"yes    Patient's current weight today shows a difference of 0 lbs     than last documented weight.      Intake/Output Summary (Last 24 hours) at 4/7/2024 0345  Last data filed at 4/6/2024 1949  Gross per 24 hour   Intake 1128 ml   Output 50 ml   Net 1078 ml       Education Booklet Provided: yes    Comorbidities Reviewed Yes    Patient has a past medical history of Ambulatory dysfunction, Aortic stenosis, Arthritis, Asthma, Bilateral hilar adenopathy syndrome, CAD (coronary artery disease), Cardiomyopathy ([facility]), CHF (congestive heart failure) ([facility]), Chronic pain, COPD (chronic obstructive pulmonary disease) ([facility]), CVA (cerebral vascular accident) ([facility]), Depression, Diabetes mellitus ([facility]), Difficult intravenous access, Emphysema of lung ([facility]), ESRD (end stage renal disease) on dialysis ([facility]), Fear of needles, Gastric ulcer, GERD (gastroesophageal reflux disease), Heart valve problem, Hemodialysis patient (HCC), History of spinal fracture, Hx of blood clots, Hyperlipidemia, Hypertension, MI (myocardial infarction) (HCC), Neuromuscular disorder (HCC), Numbness and tingling in left arm, Pneumonia, PONV 
CHF Care Plan      Patient's EF (Ejection Fraction) is less than 40%    Heart Failure Medications:  Diuretics:: None spironolactone given today, D/C since then     (One of the following REQUIRED for EF </= 40%/SYSTOLIC FAILURE but MAY be used in EF% >40%/DIASTOLIC FAILURE)        ACE:: None        ARB:: None         ARNI:: None    (Beta Blockers)  NON- Evidenced Based Beta Blocker (for EF% >40%/DIASTOLIC FAILURE): None    Evidenced Based Beta Blocker::(REQUIRED for EF% <40%/SYSTOLIC FAILURE) Metoprolol SUCCinate- Toprol XL  ...................................................................................................................................................    Failed to redirect to the Timeline version of the [employer] SmartLink.      Patient's weights and intake/output reviewed    Daily Weight log at bedside, patient/family participation in use of log: \"yes    Patient's current weight today shows a difference of 6 lbs less than last documented weight.      Intake/Output Summary (Last 24 hours) at 4/6/2024 1608  Last data filed at 4/6/2024 1436  Gross per 24 hour   Intake 1344 ml   Output 0 ml   Net 1344 ml       Education Booklet Provided: yes    Comorbidities Reviewed Yes    Patient has a past medical history of Ambulatory dysfunction, Aortic stenosis, Arthritis, Asthma, Bilateral hilar adenopathy syndrome, CAD (coronary artery disease), Cardiomyopathy (HCC), CHF (congestive heart failure) ([facility]), Chronic pain, COPD (chronic obstructive pulmonary disease) (HCC), CVA (cerebral vascular accident) ([facility]), Depression, Diabetes mellitus (HCC), Difficult intravenous access, Emphysema of lung ([facility]), ESRD (end stage renal disease) on dialysis ([facility]), Fear of needles, Gastric ulcer, GERD (gastroesophageal reflux disease), Heart valve problem, Hemodialysis patient ([facility]), History of spinal fracture, Hx of blood clots, Hyperlipidemia, Hypertension, MI (myocardial infarction) ([facility]), Neuromuscular disorder ([facility]), Numbness 
CHF Care Plan      Patient's EF (Ejection Fraction) is less than 40%    Heart Failure Medications:  Diuretics:: Spironolactone    (One of the following REQUIRED for EF </= 40%/SYSTOLIC FAILURE but MAY be used in EF% >40%/DIASTOLIC FAILURE)        ACE:: None        ARB:: None         ARNI:: None    (Beta Blockers)  NON- Evidenced Based Beta Blocker (for EF% >40%/DIASTOLIC FAILURE): None    Evidenced Based Beta Blocker::(REQUIRED for EF% <40%/SYSTOLIC FAILURE) Metoprolol SUCCinate- Toprol XL  ...................................................................................................................................................    Failed to redirect to the Timeline version of the [employer] SmartLink.      Patient's weights and intake/output reviewed    Daily Weight log at bedside, patient/family participation in use of log: \"yes    Patient's current weight today shows a difference of 0 lbs more than last documented weight.      Intake/Output Summary (Last 24 hours) at 3/27/2024 1515  Last data filed at 3/27/2024 1441  Gross per 24 hour   Intake 806 ml   Output --   Net 806 ml       Education Booklet Provided: yes    Comorbidities Reviewed Yes    Patient has a past medical history of Ambulatory dysfunction, Aortic stenosis, Arthritis, Asthma, Bilateral hilar adenopathy syndrome, CAD (coronary artery disease), Cardiomyopathy ([facility]), CHF (congestive heart failure) ([facility]), Chronic pain, COPD (chronic obstructive pulmonary disease) ([facility]), CVA (cerebral vascular accident) ([facility]), Depression, Diabetes mellitus ([facility]), Difficult intravenous access, Emphysema of lung ([facility]), ESRD (end stage renal disease) on dialysis ([facility]), Fear of needles, Gastric ulcer, GERD (gastroesophageal reflux disease), Heart valve problem, Hemodialysis patient (HCC), History of spinal fracture, Hx of blood clots, Hyperlipidemia, Hypertension, MI (myocardial infarction) ([facility]), Neuromuscular disorder (HCC), Numbness and tingling in left arm, Pneumonia, 
CHF Care Plan      Patient's EF (Ejection Fraction) is less than 40%    Heart Failure Medications:  Diuretics::spironolactone      (One of the following REQUIRED for EF </= 40%/SYSTOLIC FAILURE but MAY be used in EF% >40%/DIASTOLIC FAILURE)        ACE:: None        ARB:: None         ARNI:: Sacubitril/Valsartan-Entresto    (Beta Blockers)  NON- Evidenced Based Beta Blocker (for EF% >40%/DIASTOLIC FAILURE): metoprolol succinate    Evidenced Based Beta Blocker::(REQUIRED for EF% <40%/SYSTOLIC FAILURE) Metoprolol SUCCinate- Toprol XL  ...................................................................................................................................................    Failed to redirect to the Timeline version of the [employer] SmartLink.      Patient's weights and intake/output reviewed    Daily Weight log at bedside, patient/family participation in use of log: \"yes    Patient's current weight today shows a difference of pt refused weight for today. Yesterday's weight was 97.3 kg  unknown  than last documented weight.      Intake/Output Summary (Last 24 hours) at 4/4/2024 1917  Last data filed at 4/4/2024 1839  Gross per 24 hour   Intake 740 ml   Output 50 ml   Net 690 ml       Education Booklet Provided: yes    Comorbidities Reviewed Yes    Patient has a past medical history of Ambulatory dysfunction, Aortic stenosis, Arthritis, Asthma, Bilateral hilar adenopathy syndrome, CAD (coronary artery disease), Cardiomyopathy (HCC), CHF (congestive heart failure) ([facility]), Chronic pain, COPD (chronic obstructive pulmonary disease) (HCC), CVA (cerebral vascular accident) (HCC), Depression, Diabetes mellitus (HCC), Difficult intravenous access, Emphysema of lung (HCC), ESRD (end stage renal disease) on dialysis (HCC), Fear of needles, Gastric ulcer, GERD (gastroesophageal reflux disease), Heart valve problem, Hemodialysis patient (HCC), History of spinal fracture, Hx of blood clots, Hyperlipidemia, Hypertension, MI 
PT eval complete, goal to return to baseline function  
Problem: Infection - Adult  Goal: Absence of infection during hospitalization  4/1/2024 0607 by Angelika Foreman, RN  Outcome: Progressing  Note: Allowed his CHG bath today.     Problem: Metabolic/Fluid and Electrolytes - Adult  Goal: Glucose maintained within prescribed range  4/1/2024 0607 by Angelika Foreman, RN  Note: Checking ACHS.     Problem: Cardiovascular - Adult  Goal: Maintains optimal cardiac output and hemodynamic stability  4/1/2024 0607 by Angelika Foreman RN  Outcome: Progressing  Note:   CHF Care Plan      Patient's EF (Ejection Fraction) is less than 40%    Heart Failure Medications:  Diuretics:: Spironolactone    (One of the following REQUIRED for EF </= 40%/SYSTOLIC FAILURE but MAY be used in EF% >40%/DIASTOLIC FAILURE)        ACE:: None        ARB:: None         ARNI:: Sacubitril/Valsartan-Entresto    (Beta Blockers)  NON- Evidenced Based Beta Blocker (for EF% >40%/DIASTOLIC FAILURE): None    Evidenced Based Beta Blocker::(REQUIRED for EF% <40%/SYSTOLIC FAILURE) Metoprolol SUCCinate- Toprol XL  ...................................................................................................................................................    Failed to redirect to the Timeline version of the [employer] SmartLink.      Patient's weights and intake/output reviewed    Daily Weight log at bedside, patient/family participation in use of log: \"yes    Patient's current weight today shows a difference of 5.6 lbs more than last documented weight.      Intake/Output Summary (Last 24 hours) at 4/1/2024 0608  Last data filed at 4/1/2024 0357  Gross per 24 hour   Intake 960 ml   Output --   Net 960 ml       Education Booklet Provided: yes    Comorbidities Reviewed Yes    Patient has a past medical history of Ambulatory dysfunction, Aortic stenosis, Arthritis, Asthma, Bilateral hilar adenopathy syndrome, CAD (coronary artery disease), Cardiomyopathy ([facility]), CHF (congestive heart failure) ([facility]), Chronic pain, COPD 
Problem: Metabolic/Fluid and Electrolytes - Adult  Goal: Glucose maintained within prescribed range  Outcome: Progressing  Note: BG reasonably well controlled. Checking ACHS. Pt receiving basal insulin and SSI.     Problem: Infection - Adult  Goal: Absence of infection during hospitalization  Note: Refuses CHG baths at times despite education R/T rationale.    Problem: Cardiovascular - Adult  Goal: Maintains optimal cardiac output and hemodynamic stability  Outcome: Progressing  Note:   CHF Care Plan      Patient's EF (Ejection Fraction) is less than 40%    Heart Failure Medications:  Diuretics:: Spironolactone    (One of the following REQUIRED for EF </= 40%/SYSTOLIC FAILURE but MAY be used in EF% >40%/DIASTOLIC FAILURE)        ACE:: None        ARB:: None         ARNI:: Sacubitril/Valsartan-Entresto    (Beta Blockers)  NON- Evidenced Based Beta Blocker (for EF% >40%/DIASTOLIC FAILURE): None    Evidenced Based Beta Blocker::(REQUIRED for EF% <40%/SYSTOLIC FAILURE) Metoprolol SUCCinate- Toprol XL  ...................................................................................................................................................    Failed to redirect to the Timeline version of the [employer] SmartLink.      Patient's weights and intake/output reviewed    Daily Weight log at bedside, patient/family participation in use of log: \"yes    Patient's current weight today shows a difference of 0.7 kg more than last documented weight.      Intake/Output Summary (Last 24 hours) at 3/30/2024 2255  Last data filed at 3/30/2024 2049  Gross per 24 hour   Intake 600 ml   Output 0 ml   Net 600 ml       Education Booklet Provided: yes    Comorbidities Reviewed Yes    Patient has a past medical history of Ambulatory dysfunction, Aortic stenosis, Arthritis, Asthma, Bilateral hilar adenopathy syndrome, CAD (coronary artery disease), Cardiomyopathy (HCC), CHF (congestive heart failure) (HCC), Chronic pain, COPD (chronic 
Protect patient from fall injury by keeping bed in low position, use of non skid socks and bed alarm system. Keep belongings and call light with reach at all times and following fall protocol. Encourage patient to ask for pain medication before pain is above a 5 on the 1/10 pain scale.  Medicate before PT or increased activity. Use alternatives such as ice (if ordered) or distraction as often as possible to minimize  need for medication.   Monitor blood glucose, vs, daily weights and I and O.    Monitor labs for signs of infection and electrolyte balance.   Monitor skin and wounds for signs of healing or infection.  
Reviewed plan of care with pt including DC barriers  
UE There ex, Bed mobility, Transfers, ADL training, Adaptative equipment training, Therapeutic activity, Neuromuscular re-education, Patient education   
2126 by Judy Hicks, RN  Outcome: Progressing  3/29/2024 2049 by Elsy Meier RN  Outcome: Progressing  Flowsheets (Taken 3/29/2024 1948 by Judy Hicks, RN)  Care Plan - Patient's Chronic Conditions and Co-Morbidity Symptoms are Monitored and Maintained or Improved:   Monitor and assess patient's chronic conditions and comorbid symptoms for stability, deterioration, or improvement   Collaborate with multidisciplinary team to address chronic and comorbid conditions and prevent exacerbation or deterioration   Update acute care plan with appropriate goals if chronic or comorbid symptoms are exacerbated and prevent overall improvement and discharge     Problem: Skin/Tissue Integrity  Goal: Absence of new skin breakdown  Description: 1.  Monitor for areas of redness and/or skin breakdown  2.  Assess vascular access sites hourly  3.  Every 4-6 hours minimum:  Change oxygen saturation probe site  4.  Every 4-6 hours:  If on nasal continuous positive airway pressure, respiratory therapy assess nares and determine need for appliance change or resting period.  3/29/2024 2126 by Judy Hicks RN  Outcome: Progressing  3/29/2024 2049 by Elys Meier RN  Outcome: Progressing     Problem: Safety - Adult  Goal: Free from fall injury  3/29/2024 2126 by Judy Hicks RN  Outcome: Progressing  3/29/2024 2049 by Elsy Meier RN  Outcome: Progressing     
Respiratory - Adult  Goal: Achieves optimal ventilation and oxygenation  Outcome: Progressing     
comorbid symptoms for stability, deterioration, or improvement   Collaborate with multidisciplinary team to address chronic and comorbid conditions and prevent exacerbation or deterioration   Update acute care plan with appropriate goals if chronic or comorbid symptoms are exacerbated and prevent overall improvement and discharge  4/9/2024 1513 by Cassidy Mejia RN  Outcome: Progressing     Problem: Skin/Tissue Integrity  Goal: Absence of new skin breakdown  Description: 1.  Monitor for areas of redness and/or skin breakdown  2.  Assess vascular access sites hourly  3.  Every 4-6 hours minimum:  Change oxygen saturation probe site  4.  Every 4-6 hours:  If on nasal continuous positive airway pressure, respiratory therapy assess nares and determine need for appliance change or resting period.  4/9/2024 2229 by Vick Fowler LPN  Outcome: Progressing  4/9/2024 2228 by Vick Fowler LPN  Outcome: Progressing  4/9/2024 1513 by Cassidy Mejia RN  Outcome: Progressing     Problem: Safety - Adult  Goal: Free from fall injury  4/9/2024 2229 by Vick Fowler LPN  Outcome: Progressing  4/9/2024 2228 by Vick Fowler LPN  Outcome: Progressing  4/9/2024 1513 by Cassidy Mejia RN  Outcome: Progressing     Problem: Respiratory - Adult  Goal: Achieves optimal ventilation and oxygenation  4/9/2024 2229 by Vick Fowler LPN  Outcome: Progressing  4/9/2024 2228 by Vick Fowler LPN  Outcome: Progressing  Flowsheets (Taken 4/9/2024 2100)  Achieves optimal ventilation and oxygenation: Assess for changes in respiratory status  4/9/2024 1513 by Cassidy Mejia RN  Outcome: Progressing     Problem: Cardiovascular - Adult  Goal: Maintains optimal cardiac output and hemodynamic stability  4/9/2024 2229 by Vick Fowler LPN  Outcome: Progressing  4/9/2024 2228 by Vick Fowler LPN  Outcome: Progressing  Flowsheets (Taken 4/9/2024 2100)  Maintains optimal cardiac output and hemodynamic stability: Monitor blood pressure 
more than last documented weight.      Intake/Output Summary (Last 24 hours) at 4/6/2024 0040  Last data filed at 4/6/2024 0035  Gross per 24 hour   Intake 2300 ml   Output 3543 ml   Net -1243 ml       Education Booklet Provided: yes    Comorbidities Reviewed Yes    Patient has a past medical history of Ambulatory dysfunction, Aortic stenosis, Arthritis, Asthma, Bilateral hilar adenopathy syndrome, CAD (coronary artery disease), Cardiomyopathy ([facility]), CHF (congestive heart failure) ([facility]), Chronic pain, COPD (chronic obstructive pulmonary disease) (HCC), CVA (cerebral vascular accident) (HCC), Depression, Diabetes mellitus (HCC), Difficult intravenous access, Emphysema of lung ([facility]), ESRD (end stage renal disease) on dialysis ([facility]), Fear of needles, Gastric ulcer, GERD (gastroesophageal reflux disease), Heart valve problem, Hemodialysis patient ([facility]), History of spinal fracture, Hx of blood clots, Hyperlipidemia, Hypertension, MI (myocardial infarction) ([facility]), Neuromuscular disorder ([facility]), Numbness and tingling in left arm, Pneumonia, PONV (postoperative nausea and vomiting), Prolonged emergence from general anesthesia, Sleep apnea, Stroke ([facility]), TIA (transient ischemic attack), and Unspecified diseases of blood and blood-forming organs.     >>For CHF and Comorbidity documentation on Education Time and Topics, please see Education Tab      Pt resting in bed at this time on  4 L O2. Pt denies shortness of breath. Pt with nonpitting lower extremity edema.     Patient and/or Family's stated Goal of Care this Admission: increase activity tolerance, better understand heart failure and disease management, be more comfortable, and reduce lower extremity edema prior to discharge        :         
see Education Tab      Pt resting in bed at this time on  4 L O2. Pt denies shortness of breath. Pt with nonpitting lower extremity edema.     Patient and/or Family's stated Goal of Care this Admission: reduce shortness of breath, increase activity tolerance, better understand heart failure and disease management, be more comfortable, and reduce lower extremity edema prior to discharge        :

## 2024-04-11 NOTE — DISCHARGE SUMMARY
3/27/2024  Lower Extremities DVT Study  Demographics   Patient Name      RONEN SANCHEZ   Date of Study     03/27/2024          Gender              Male   Patient Number    [id number]          Date of Birth       1970   Visit Number      [id number]           Age                 53 year(s)   Accession Number  [id number]          Room Number         0427   Corporate ID      [id number]             Sonographer         Musa Miranda RDMS, RVT   Ordering          [facility]  Physician         MD LAURIE              Physician           Readers                                                            Brenden Rosario MD  Procedure Type of Study:   Veins:Lower Extremities DVT Study, VASC EXTREMITY VENOUS DUPLEX BILATERAL.   Vascular Sonographer Report  Indications for Study:Leg pain. Additional Indications:Right shin pain, patient states its constant (non-healing wound on right foot and left toe). Venous Duplex Scan: B-mode imaging of the deep and superficial veins, with compression maneuvers, including color and Doppler spectral waveform analysis. Impressions Right Impression Technically difficult and limited exam due to patients intolerance to compression maneuvers. The common femoral vein and sapheno-femoral junction were unable to be compressed due to limitations however adequate color flow was demonstrated. The peroneal veins were difficult to compress due to patients intolerance. Within the limits of this exam, there is no evidence of deep or superficial venous thrombosis involving the right lower extremity. Incidental finding of possibly occluded mid posterior tibial artery, atherosclerotic plaque demonstrated throughout the right lower extremity in arteries. Left Impression There is no evidence of deep venous thrombosis involving the common femoral vein. The common femroal vein was unable to be compressed due to

## 2024-04-15 ENCOUNTER — CLINICAL DOCUMENTATION (OUTPATIENT)
Dept: CASE MANAGEMENT | Age: 54
End: 2024-04-15

## 2024-04-15 NOTE — MANAGEMENT PLAN
2017    Arthritis       hands and hips    Asthma      Bilateral hilar adenopathy syndrome 6/3/2013    CAD (coronary artery disease)       Dr. Javier Chanel [facility]    Cardiomyopathy ([facility]) 04/19/2019     EF= 43%    CHF (congestive heart failure) ([facility])      Chronic pain      COPD (chronic obstructive pulmonary disease) ([facility])       pulmonology Dr. Lester Duran      Diabetes mellitus ([facility])       borderline    Difficult intravenous access      Emphysema of lung ([facility])      ESRD (end stage renal disease) on dialysis ([facility])       MWF    Fear of needles      Gastric ulcer      GERD (gastroesophageal reflux disease)      Heart valve problem       bicuspic valve    Hemodialysis patient ([facility])      History of spinal fracture       work incident    Hx of blood clots       Bilateral lower extremities; stents in place    Hyperlipidemia      Hypertension      MI (myocardial infarction) ([facility]) 2019     has had 9 MIs. 2019 was the last    Neuromuscular disorder ([facility])       due to CVA    Numbness and tingling in left arm       from fistula    Pneumonia      PONV (postoperative nausea and vomiting)      Prolonged emergence from general anesthesia       States requires more medication than most people    Sleep apnea       Uses CPAP    Stroke ([facility])       7mm thalamic cva 2017 deficts left side, left side weakness    TIA (transient ischemic attack)      Unspecified diseases of blood and blood-forming organs                                              Challenges for Self Management: Utilization: ED/Hospital admissions and Emotional status of Patient (depression screening score, guarded affect)            Medication Management: Complications from poor adherence and Poor Adherence               Advanced Care Plan: None                     Management Plan entered by: JACQUELINE Li              * This plan has been created by the Care Coordination Committee, a multi-disciplinary team. The patient and their physician were invited to

## 2024-05-02 ENCOUNTER — APPOINTMENT (OUTPATIENT)
Dept: GENERAL RADIOLOGY | Age: 54
End: 2024-05-02
Attending: INTERNAL MEDICINE
Payer: MEDICARE

## 2024-05-02 ENCOUNTER — ANESTHESIA EVENT (OUTPATIENT)
Dept: OPERATING ROOM | Age: 54
End: 2024-05-02
Payer: COMMERCIAL

## 2024-05-02 ENCOUNTER — ANESTHESIA (OUTPATIENT)
Dept: OPERATING ROOM | Age: 54
End: 2024-05-02
Payer: COMMERCIAL

## 2024-05-02 ENCOUNTER — HOSPITAL ENCOUNTER (INPATIENT)
Age: 54
LOS: 8 days | Discharge: NURSING, REHAB OR LONG-TERM CARE | End: 2024-05-10
Attending: INTERNAL MEDICINE | Admitting: INTERNAL MEDICINE
Payer: MEDICARE

## 2024-05-02 DIAGNOSIS — M86.671 CHRONIC OSTEOMYELITIS INVOLVING RIGHT ANKLE AND FOOT (HCC): Primary | ICD-10-CM

## 2024-05-02 DIAGNOSIS — L97.524 FOOT ULCER WITH NECROSIS OF BONE, LEFT (HCC): ICD-10-CM

## 2024-05-02 DIAGNOSIS — L97.514 RIGHT FOOT ULCER, WITH NECROSIS OF BONE (HCC): ICD-10-CM

## 2024-05-02 DIAGNOSIS — I96 GANGRENE OF RIGHT FOOT (HCC): ICD-10-CM

## 2024-05-02 PROBLEM — L08.9 FOOT INFECTION: Status: ACTIVE | Noted: 2024-05-02

## 2024-05-02 LAB
ALBUMIN SERPL-MCNC: 3.7 G/DL (ref 3.4–5)
ALBUMIN/GLOB SERPL: 1.1 {RATIO} (ref 1.1–2.2)
ALP SERPL-CCNC: 167 U/L (ref 40–129)
ALT SERPL-CCNC: 7 U/L (ref 10–40)
ANION GAP SERPL CALCULATED.3IONS-SCNC: 9 MMOL/L (ref 3–16)
AST SERPL-CCNC: 9 U/L (ref 15–37)
BASOPHILS # BLD: 0.1 K/UL (ref 0–0.2)
BASOPHILS NFR BLD: 0.9 %
BILIRUB SERPL-MCNC: <0.2 MG/DL (ref 0–1)
BUN SERPL-MCNC: 40 MG/DL (ref 7–20)
CALCIUM SERPL-MCNC: 10 MG/DL (ref 8.3–10.6)
CHLORIDE SERPL-SCNC: 91 MMOL/L (ref 99–110)
CO2 SERPL-SCNC: 34 MMOL/L (ref 21–32)
CREAT SERPL-MCNC: 5 MG/DL (ref 0.9–1.3)
DEPRECATED RDW RBC AUTO: 16.7 % (ref 12.4–15.4)
EOSINOPHIL # BLD: 0.5 K/UL (ref 0–0.6)
EOSINOPHIL NFR BLD: 5.8 %
GFR SERPLBLD CREATININE-BSD FMLA CKD-EPI: 13 ML/MIN/{1.73_M2}
GLUCOSE BLD-MCNC: 153 MG/DL (ref 70–99)
GLUCOSE BLD-MCNC: 170 MG/DL (ref 70–99)
GLUCOSE BLD-MCNC: 181 MG/DL (ref 70–99)
GLUCOSE BLD-MCNC: 268 MG/DL (ref 70–99)
GLUCOSE SERPL-MCNC: 166 MG/DL (ref 70–99)
HCT VFR BLD AUTO: 30.6 % (ref 40.5–52.5)
HGB BLD-MCNC: 9.7 G/DL (ref 13.5–17.5)
LYMPHOCYTES # BLD: 0.6 K/UL (ref 1–5.1)
LYMPHOCYTES NFR BLD: 6.9 %
MCH RBC QN AUTO: 27.3 PG (ref 26–34)
MCHC RBC AUTO-ENTMCNC: 31.6 G/DL (ref 31–36)
MCV RBC AUTO: 86.5 FL (ref 80–100)
MONOCYTES # BLD: 0.7 K/UL (ref 0–1.3)
MONOCYTES NFR BLD: 7.2 %
NEUTROPHILS # BLD: 7.4 K/UL (ref 1.7–7.7)
NEUTROPHILS NFR BLD: 79.2 %
PERFORMED ON: ABNORMAL
PLATELET # BLD AUTO: 298 K/UL (ref 135–450)
PMV BLD AUTO: 7.7 FL (ref 5–10.5)
POTASSIUM SERPL-SCNC: 4.2 MMOL/L (ref 3.5–5.1)
PROT SERPL-MCNC: 7.2 G/DL (ref 6.4–8.2)
RBC # BLD AUTO: 3.54 M/UL (ref 4.2–5.9)
SODIUM SERPL-SCNC: 134 MMOL/L (ref 136–145)
SPECIMEN STATUS: NORMAL
WBC # BLD AUTO: 9.3 K/UL (ref 4–11)

## 2024-05-02 PROCEDURE — 2580000003 HC RX 258: Performed by: STUDENT IN AN ORGANIZED HEALTH CARE EDUCATION/TRAINING PROGRAM

## 2024-05-02 PROCEDURE — 6360000002 HC RX W HCPCS: Performed by: STUDENT IN AN ORGANIZED HEALTH CARE EDUCATION/TRAINING PROGRAM

## 2024-05-02 PROCEDURE — 85025 COMPLETE CBC W/AUTO DIFF WBC: CPT

## 2024-05-02 PROCEDURE — 2580000003 HC RX 258: Performed by: NURSE ANESTHETIST, CERTIFIED REGISTERED

## 2024-05-02 PROCEDURE — 6370000000 HC RX 637 (ALT 250 FOR IP): Performed by: ANESTHESIOLOGY

## 2024-05-02 PROCEDURE — 73630 X-RAY EXAM OF FOOT: CPT

## 2024-05-02 PROCEDURE — 7100000001 HC PACU RECOVERY - ADDTL 15 MIN: Performed by: STUDENT IN AN ORGANIZED HEALTH CARE EDUCATION/TRAINING PROGRAM

## 2024-05-02 PROCEDURE — 80053 COMPREHEN METABOLIC PANEL: CPT

## 2024-05-02 PROCEDURE — 88305 TISSUE EXAM BY PATHOLOGIST: CPT

## 2024-05-02 PROCEDURE — 0QTN0ZZ RESECTION OF RIGHT METATARSAL, OPEN APPROACH: ICD-10-PCS | Performed by: STUDENT IN AN ORGANIZED HEALTH CARE EDUCATION/TRAINING PROGRAM

## 2024-05-02 PROCEDURE — 1200000000 HC SEMI PRIVATE

## 2024-05-02 PROCEDURE — A4217 STERILE WATER/SALINE, 500 ML: HCPCS | Performed by: STUDENT IN AN ORGANIZED HEALTH CARE EDUCATION/TRAINING PROGRAM

## 2024-05-02 PROCEDURE — 3600000002 HC SURGERY LEVEL 2 BASE: Performed by: STUDENT IN AN ORGANIZED HEALTH CARE EDUCATION/TRAINING PROGRAM

## 2024-05-02 PROCEDURE — 36415 COLL VENOUS BLD VENIPUNCTURE: CPT

## 2024-05-02 PROCEDURE — 2709999900 HC NON-CHARGEABLE SUPPLY: Performed by: STUDENT IN AN ORGANIZED HEALTH CARE EDUCATION/TRAINING PROGRAM

## 2024-05-02 PROCEDURE — 88311 DECALCIFY TISSUE: CPT

## 2024-05-02 PROCEDURE — 6370000000 HC RX 637 (ALT 250 FOR IP): Performed by: STUDENT IN AN ORGANIZED HEALTH CARE EDUCATION/TRAINING PROGRAM

## 2024-05-02 PROCEDURE — 99222 1ST HOSP IP/OBS MODERATE 55: CPT | Performed by: INTERNAL MEDICINE

## 2024-05-02 PROCEDURE — 0K9V0ZZ DRAINAGE OF RIGHT FOOT MUSCLE, OPEN APPROACH: ICD-10-PCS | Performed by: STUDENT IN AN ORGANIZED HEALTH CARE EDUCATION/TRAINING PROGRAM

## 2024-05-02 PROCEDURE — 2700000000 HC OXYGEN THERAPY PER DAY

## 2024-05-02 PROCEDURE — 6360000002 HC RX W HCPCS: Performed by: NURSE ANESTHETIST, CERTIFIED REGISTERED

## 2024-05-02 PROCEDURE — 6360000002 HC RX W HCPCS: Performed by: ANESTHESIOLOGY

## 2024-05-02 PROCEDURE — 87040 BLOOD CULTURE FOR BACTERIA: CPT

## 2024-05-02 PROCEDURE — 3700000000 HC ANESTHESIA ATTENDED CARE: Performed by: STUDENT IN AN ORGANIZED HEALTH CARE EDUCATION/TRAINING PROGRAM

## 2024-05-02 PROCEDURE — 7100000000 HC PACU RECOVERY - FIRST 15 MIN: Performed by: STUDENT IN AN ORGANIZED HEALTH CARE EDUCATION/TRAINING PROGRAM

## 2024-05-02 PROCEDURE — 2500000003 HC RX 250 WO HCPCS: Performed by: NURSE ANESTHETIST, CERTIFIED REGISTERED

## 2024-05-02 PROCEDURE — 0HRMXK3 REPLACEMENT OF RIGHT FOOT SKIN WITH NONAUTOLOGOUS TISSUE SUBSTITUTE, FULL THICKNESS, EXTERNAL APPROACH: ICD-10-PCS | Performed by: STUDENT IN AN ORGANIZED HEALTH CARE EDUCATION/TRAINING PROGRAM

## 2024-05-02 PROCEDURE — 87205 SMEAR GRAM STAIN: CPT

## 2024-05-02 PROCEDURE — 87077 CULTURE AEROBIC IDENTIFY: CPT

## 2024-05-02 PROCEDURE — 87102 FUNGUS ISOLATION CULTURE: CPT

## 2024-05-02 PROCEDURE — 87075 CULTR BACTERIA EXCEPT BLOOD: CPT

## 2024-05-02 PROCEDURE — 94640 AIRWAY INHALATION TREATMENT: CPT

## 2024-05-02 PROCEDURE — 94761 N-INVAS EAR/PLS OXIMETRY MLT: CPT

## 2024-05-02 PROCEDURE — 3700000001 HC ADD 15 MINUTES (ANESTHESIA): Performed by: STUDENT IN AN ORGANIZED HEALTH CARE EDUCATION/TRAINING PROGRAM

## 2024-05-02 PROCEDURE — 87070 CULTURE OTHR SPECIMN AEROBIC: CPT

## 2024-05-02 PROCEDURE — 3600000012 HC SURGERY LEVEL 2 ADDTL 15MIN: Performed by: STUDENT IN AN ORGANIZED HEALTH CARE EDUCATION/TRAINING PROGRAM

## 2024-05-02 DEVICE — INTEGRA® BILAYER MATRIX WOUND DRESSING 4 IN*5 IN (10 CM*12.5 CM)
Type: IMPLANTABLE DEVICE | Site: FOOT | Status: FUNCTIONAL
Brand: INTEGRA®

## 2024-05-02 RX ORDER — PROPOFOL 10 MG/ML
INJECTION, EMULSION INTRAVENOUS PRN
Status: DISCONTINUED | OUTPATIENT
Start: 2024-05-02 | End: 2024-05-02 | Stop reason: SDUPTHER

## 2024-05-02 RX ORDER — DEXMEDETOMIDINE HYDROCHLORIDE 100 UG/ML
INJECTION, SOLUTION INTRAVENOUS PRN
Status: DISCONTINUED | OUTPATIENT
Start: 2024-05-02 | End: 2024-05-02 | Stop reason: SDUPTHER

## 2024-05-02 RX ORDER — POLYETHYLENE GLYCOL 3350 17 G/17G
17 POWDER, FOR SOLUTION ORAL DAILY PRN
Status: DISCONTINUED | OUTPATIENT
Start: 2024-05-02 | End: 2024-05-10 | Stop reason: HOSPADM

## 2024-05-02 RX ORDER — ACETAMINOPHEN 325 MG/1
650 TABLET ORAL EVERY 6 HOURS PRN
Status: DISCONTINUED | OUTPATIENT
Start: 2024-05-02 | End: 2024-05-10 | Stop reason: HOSPADM

## 2024-05-02 RX ORDER — OXYCODONE HYDROCHLORIDE 5 MG/1
5 TABLET ORAL ONCE
Status: COMPLETED | OUTPATIENT
Start: 2024-05-02 | End: 2024-05-02

## 2024-05-02 RX ORDER — ACETAMINOPHEN 500 MG
1000 TABLET ORAL
Status: DISCONTINUED | OUTPATIENT
Start: 2024-05-02 | End: 2024-05-02 | Stop reason: HOSPADM

## 2024-05-02 RX ORDER — IPRATROPIUM BROMIDE AND ALBUTEROL SULFATE 2.5; .5 MG/3ML; MG/3ML
1 SOLUTION RESPIRATORY (INHALATION) EVERY 4 HOURS PRN
Status: DISCONTINUED | OUTPATIENT
Start: 2024-05-02 | End: 2024-05-08

## 2024-05-02 RX ORDER — ONDANSETRON 2 MG/ML
4 INJECTION INTRAMUSCULAR; INTRAVENOUS
Status: DISCONTINUED | OUTPATIENT
Start: 2024-05-02 | End: 2024-05-02 | Stop reason: HOSPADM

## 2024-05-02 RX ORDER — BUPIVACAINE HYDROCHLORIDE 5 MG/ML
INJECTION, SOLUTION EPIDURAL; INTRACAUDAL PRN
Status: DISCONTINUED | OUTPATIENT
Start: 2024-05-02 | End: 2024-05-02 | Stop reason: ALTCHOICE

## 2024-05-02 RX ORDER — OXYCODONE HYDROCHLORIDE 5 MG/1
10 TABLET ORAL EVERY 6 HOURS PRN
Status: DISCONTINUED | OUTPATIENT
Start: 2024-05-02 | End: 2024-05-04

## 2024-05-02 RX ORDER — INSULIN GLARGINE 100 [IU]/ML
15 INJECTION, SOLUTION SUBCUTANEOUS NIGHTLY
Status: DISCONTINUED | OUTPATIENT
Start: 2024-05-02 | End: 2024-05-10 | Stop reason: HOSPADM

## 2024-05-02 RX ORDER — DULOXETIN HYDROCHLORIDE 60 MG/1
60 CAPSULE, DELAYED RELEASE ORAL DAILY
Status: DISCONTINUED | OUTPATIENT
Start: 2024-05-03 | End: 2024-05-10 | Stop reason: HOSPADM

## 2024-05-02 RX ORDER — SODIUM CHLORIDE 0.9 % (FLUSH) 0.9 %
5-40 SYRINGE (ML) INJECTION PRN
Status: DISCONTINUED | OUTPATIENT
Start: 2024-05-02 | End: 2024-05-02 | Stop reason: HOSPADM

## 2024-05-02 RX ORDER — FENTANYL CITRATE 50 UG/ML
INJECTION, SOLUTION INTRAMUSCULAR; INTRAVENOUS PRN
Status: DISCONTINUED | OUTPATIENT
Start: 2024-05-02 | End: 2024-05-02 | Stop reason: SDUPTHER

## 2024-05-02 RX ORDER — DROPERIDOL 2.5 MG/ML
0.62 INJECTION, SOLUTION INTRAMUSCULAR; INTRAVENOUS
Status: DISCONTINUED | OUTPATIENT
Start: 2024-05-02 | End: 2024-05-02 | Stop reason: HOSPADM

## 2024-05-02 RX ORDER — SODIUM CHLORIDE 9 MG/ML
INJECTION, SOLUTION INTRAVENOUS PRN
Status: DISCONTINUED | OUTPATIENT
Start: 2024-05-02 | End: 2024-05-10 | Stop reason: HOSPADM

## 2024-05-02 RX ORDER — SODIUM CHLORIDE 0.9 % (FLUSH) 0.9 %
5-40 SYRINGE (ML) INJECTION PRN
Status: DISCONTINUED | OUTPATIENT
Start: 2024-05-02 | End: 2024-05-10 | Stop reason: HOSPADM

## 2024-05-02 RX ORDER — QUETIAPINE FUMARATE 25 MG/1
50 TABLET, FILM COATED ORAL NIGHTLY
Status: DISCONTINUED | OUTPATIENT
Start: 2024-05-02 | End: 2024-05-10 | Stop reason: HOSPADM

## 2024-05-02 RX ORDER — INSULIN LISPRO 100 [IU]/ML
0-4 INJECTION, SOLUTION INTRAVENOUS; SUBCUTANEOUS NIGHTLY
Status: DISCONTINUED | OUTPATIENT
Start: 2024-05-02 | End: 2024-05-10 | Stop reason: HOSPADM

## 2024-05-02 RX ORDER — BUSPIRONE HYDROCHLORIDE 5 MG/1
10 TABLET ORAL 3 TIMES DAILY
Status: DISCONTINUED | OUTPATIENT
Start: 2024-05-02 | End: 2024-05-10 | Stop reason: HOSPADM

## 2024-05-02 RX ORDER — METOPROLOL SUCCINATE 25 MG/1
25 TABLET, EXTENDED RELEASE ORAL DAILY
Status: DISCONTINUED | OUTPATIENT
Start: 2024-05-03 | End: 2024-05-10 | Stop reason: HOSPADM

## 2024-05-02 RX ORDER — INSULIN LISPRO 100 [IU]/ML
0-4 INJECTION, SOLUTION INTRAVENOUS; SUBCUTANEOUS
Status: DISCONTINUED | OUTPATIENT
Start: 2024-05-02 | End: 2024-05-10 | Stop reason: HOSPADM

## 2024-05-02 RX ORDER — DEXTROSE MONOHYDRATE 100 MG/ML
INJECTION, SOLUTION INTRAVENOUS CONTINUOUS PRN
Status: DISCONTINUED | OUTPATIENT
Start: 2024-05-02 | End: 2024-05-10 | Stop reason: HOSPADM

## 2024-05-02 RX ORDER — SODIUM CHLORIDE 9 MG/ML
INJECTION, SOLUTION INTRAVENOUS CONTINUOUS PRN
Status: DISCONTINUED | OUTPATIENT
Start: 2024-05-02 | End: 2024-05-02 | Stop reason: SDUPTHER

## 2024-05-02 RX ORDER — PRAVASTATIN SODIUM 40 MG
40 TABLET ORAL NIGHTLY
Status: DISCONTINUED | OUTPATIENT
Start: 2024-05-02 | End: 2024-05-10 | Stop reason: HOSPADM

## 2024-05-02 RX ORDER — ONDANSETRON 2 MG/ML
4 INJECTION INTRAMUSCULAR; INTRAVENOUS EVERY 6 HOURS PRN
Status: DISCONTINUED | OUTPATIENT
Start: 2024-05-02 | End: 2024-05-10 | Stop reason: HOSPADM

## 2024-05-02 RX ORDER — SODIUM CHLORIDE 0.9 % (FLUSH) 0.9 %
5-40 SYRINGE (ML) INJECTION EVERY 12 HOURS SCHEDULED
Status: DISCONTINUED | OUTPATIENT
Start: 2024-05-02 | End: 2024-05-10 | Stop reason: HOSPADM

## 2024-05-02 RX ORDER — MAGNESIUM HYDROXIDE 1200 MG/15ML
LIQUID ORAL CONTINUOUS PRN
Status: COMPLETED | OUTPATIENT
Start: 2024-05-02 | End: 2024-05-02

## 2024-05-02 RX ORDER — HYDROMORPHONE HYDROCHLORIDE 1 MG/ML
1 INJECTION, SOLUTION INTRAMUSCULAR; INTRAVENOUS; SUBCUTANEOUS EVERY 4 HOURS PRN
Status: DISCONTINUED | OUTPATIENT
Start: 2024-05-02 | End: 2024-05-04

## 2024-05-02 RX ORDER — TRAZODONE HYDROCHLORIDE 50 MG/1
50 TABLET ORAL DAILY
Status: DISCONTINUED | OUTPATIENT
Start: 2024-05-02 | End: 2024-05-10 | Stop reason: HOSPADM

## 2024-05-02 RX ORDER — MIDAZOLAM HYDROCHLORIDE 1 MG/ML
INJECTION INTRAMUSCULAR; INTRAVENOUS PRN
Status: DISCONTINUED | OUTPATIENT
Start: 2024-05-02 | End: 2024-05-02 | Stop reason: SDUPTHER

## 2024-05-02 RX ORDER — SODIUM CHLORIDE 9 MG/ML
INJECTION, SOLUTION INTRAVENOUS PRN
Status: DISCONTINUED | OUTPATIENT
Start: 2024-05-02 | End: 2024-05-02 | Stop reason: HOSPADM

## 2024-05-02 RX ORDER — GLUCAGON 1 MG/ML
1 KIT INJECTION PRN
Status: DISCONTINUED | OUTPATIENT
Start: 2024-05-02 | End: 2024-05-10 | Stop reason: HOSPADM

## 2024-05-02 RX ORDER — ONDANSETRON 4 MG/1
4 TABLET, ORALLY DISINTEGRATING ORAL EVERY 8 HOURS PRN
Status: DISCONTINUED | OUTPATIENT
Start: 2024-05-02 | End: 2024-05-10 | Stop reason: HOSPADM

## 2024-05-02 RX ORDER — SODIUM CHLORIDE 0.9 % (FLUSH) 0.9 %
5-40 SYRINGE (ML) INJECTION EVERY 12 HOURS SCHEDULED
Status: DISCONTINUED | OUTPATIENT
Start: 2024-05-02 | End: 2024-05-02 | Stop reason: HOSPADM

## 2024-05-02 RX ORDER — KETAMINE HCL IN NACL, ISO-OSM 100MG/10ML
SYRINGE (ML) INJECTION PRN
Status: DISCONTINUED | OUTPATIENT
Start: 2024-05-02 | End: 2024-05-02 | Stop reason: SDUPTHER

## 2024-05-02 RX ORDER — ACETAMINOPHEN 650 MG/1
650 SUPPOSITORY RECTAL EVERY 6 HOURS PRN
Status: DISCONTINUED | OUTPATIENT
Start: 2024-05-02 | End: 2024-05-10 | Stop reason: HOSPADM

## 2024-05-02 RX ORDER — NALOXONE HYDROCHLORIDE 0.4 MG/ML
INJECTION, SOLUTION INTRAMUSCULAR; INTRAVENOUS; SUBCUTANEOUS PRN
Status: DISCONTINUED | OUTPATIENT
Start: 2024-05-02 | End: 2024-05-02 | Stop reason: HOSPADM

## 2024-05-02 RX ADMIN — TRAZODONE HYDROCHLORIDE 50 MG: 50 TABLET ORAL at 20:47

## 2024-05-02 RX ADMIN — PRAVASTATIN SODIUM 40 MG: 40 TABLET ORAL at 20:45

## 2024-05-02 RX ADMIN — OXYCODONE HYDROCHLORIDE 10 MG: 5 TABLET ORAL at 20:46

## 2024-05-02 RX ADMIN — PROPOFOL 20 MG: 10 INJECTION, EMULSION INTRAVENOUS at 12:01

## 2024-05-02 RX ADMIN — CEFEPIME 1000 MG: 1 INJECTION, POWDER, FOR SOLUTION INTRAMUSCULAR; INTRAVENOUS at 21:57

## 2024-05-02 RX ADMIN — HYDROMORPHONE HYDROCHLORIDE 0.25 MG: 1 INJECTION, SOLUTION INTRAMUSCULAR; INTRAVENOUS; SUBCUTANEOUS at 13:18

## 2024-05-02 RX ADMIN — QUETIAPINE FUMARATE 50 MG: 25 TABLET ORAL at 20:46

## 2024-05-02 RX ADMIN — FENTANYL CITRATE 25 MCG: 50 INJECTION INTRAMUSCULAR; INTRAVENOUS at 11:48

## 2024-05-02 RX ADMIN — FENTANYL CITRATE 25 MCG: 50 INJECTION INTRAMUSCULAR; INTRAVENOUS at 12:23

## 2024-05-02 RX ADMIN — HYDROMORPHONE HYDROCHLORIDE 0.25 MG: 1 INJECTION, SOLUTION INTRAMUSCULAR; INTRAVENOUS; SUBCUTANEOUS at 12:58

## 2024-05-02 RX ADMIN — BUSPIRONE HYDROCHLORIDE 10 MG: 5 TABLET ORAL at 16:24

## 2024-05-02 RX ADMIN — HYDROMORPHONE HYDROCHLORIDE 0.25 MG: 1 INJECTION, SOLUTION INTRAMUSCULAR; INTRAVENOUS; SUBCUTANEOUS at 13:43

## 2024-05-02 RX ADMIN — DEXMEDETOMIDINE 8 MCG: 100 INJECTION, SOLUTION INTRAVENOUS at 11:56

## 2024-05-02 RX ADMIN — SODIUM CHLORIDE: 9 INJECTION, SOLUTION INTRAVENOUS at 11:39

## 2024-05-02 RX ADMIN — Medication 10 MG: at 11:43

## 2024-05-02 RX ADMIN — VANCOMYCIN HYDROCHLORIDE 2500 MG: 1 INJECTION, POWDER, LYOPHILIZED, FOR SOLUTION INTRAVENOUS at 16:01

## 2024-05-02 RX ADMIN — FENTANYL CITRATE 25 MCG: 50 INJECTION INTRAMUSCULAR; INTRAVENOUS at 12:13

## 2024-05-02 RX ADMIN — DEXMEDETOMIDINE 4 MCG: 100 INJECTION, SOLUTION INTRAVENOUS at 11:51

## 2024-05-02 RX ADMIN — Medication 10 MG: at 11:50

## 2024-05-02 RX ADMIN — SODIUM CHLORIDE 25 ML: 9 INJECTION, SOLUTION INTRAVENOUS at 16:01

## 2024-05-02 RX ADMIN — MIDAZOLAM 2 MG: 1 INJECTION INTRAMUSCULAR; INTRAVENOUS at 11:40

## 2024-05-02 RX ADMIN — Medication 10 MG: at 12:06

## 2024-05-02 RX ADMIN — PROPOFOL 20 MG: 10 INJECTION, EMULSION INTRAVENOUS at 12:05

## 2024-05-02 RX ADMIN — INSULIN GLARGINE 15 UNITS: 100 INJECTION, SOLUTION SUBCUTANEOUS at 20:45

## 2024-05-02 RX ADMIN — PROPOFOL 20 MG: 10 INJECTION, EMULSION INTRAVENOUS at 12:23

## 2024-05-02 RX ADMIN — BUSPIRONE HYDROCHLORIDE 10 MG: 5 TABLET ORAL at 20:45

## 2024-05-02 RX ADMIN — FENTANYL CITRATE 25 MCG: 50 INJECTION INTRAMUSCULAR; INTRAVENOUS at 12:07

## 2024-05-02 RX ADMIN — OXYCODONE 5 MG: 5 TABLET ORAL at 14:06

## 2024-05-02 RX ADMIN — HYDROMORPHONE HYDROCHLORIDE 0.25 MG: 1 INJECTION, SOLUTION INTRAMUSCULAR; INTRAVENOUS; SUBCUTANEOUS at 13:09

## 2024-05-02 RX ADMIN — SACUBITRIL AND VALSARTAN 1 TABLET: 24; 26 TABLET, FILM COATED ORAL at 20:45

## 2024-05-02 RX ADMIN — IPRATROPIUM BROMIDE AND ALBUTEROL SULFATE 1 DOSE: .5; 2.5 SOLUTION RESPIRATORY (INHALATION) at 17:14

## 2024-05-02 ASSESSMENT — PAIN - FUNCTIONAL ASSESSMENT
PAIN_FUNCTIONAL_ASSESSMENT: ACTIVITIES ARE NOT PREVENTED
PAIN_FUNCTIONAL_ASSESSMENT: 0-10
PAIN_FUNCTIONAL_ASSESSMENT: ACTIVITIES ARE NOT PREVENTED

## 2024-05-02 ASSESSMENT — PAIN SCALES - GENERAL
PAINLEVEL_OUTOF10: 8
PAINLEVEL_OUTOF10: 10
PAINLEVEL_OUTOF10: 6
PAINLEVEL_OUTOF10: 6
PAINLEVEL_OUTOF10: 8
PAINLEVEL_OUTOF10: 4
PAINLEVEL_OUTOF10: 9
PAINLEVEL_OUTOF10: 8
PAINLEVEL_OUTOF10: 9
PAINLEVEL_OUTOF10: 9

## 2024-05-02 ASSESSMENT — PAIN DESCRIPTION - PAIN TYPE
TYPE: ACUTE PAIN;SURGICAL PAIN
TYPE: SURGICAL PAIN
TYPE: ACUTE PAIN;SURGICAL PAIN
TYPE: ACUTE PAIN;SURGICAL PAIN

## 2024-05-02 ASSESSMENT — ENCOUNTER SYMPTOMS
DYSPNEA ACTIVITY LEVEL: NO INTERVAL CHANGE
SHORTNESS OF BREATH: 1

## 2024-05-02 ASSESSMENT — COPD QUESTIONNAIRES: CAT_SEVERITY: NO INTERVAL CHANGE

## 2024-05-02 ASSESSMENT — PAIN DESCRIPTION - ONSET
ONSET: ON-GOING
ONSET: GRADUAL
ONSET: ON-GOING
ONSET: ON-GOING

## 2024-05-02 ASSESSMENT — PAIN DESCRIPTION - DESCRIPTORS
DESCRIPTORS: THROBBING
DESCRIPTORS: THROBBING;STABBING
DESCRIPTORS: THROBBING
DESCRIPTORS: THROBBING
DESCRIPTORS: THROBBING;STABBING
DESCRIPTORS: STABBING;THROBBING
DESCRIPTORS: THROBBING
DESCRIPTORS: THROBBING;STABBING

## 2024-05-02 ASSESSMENT — PAIN DESCRIPTION - FREQUENCY
FREQUENCY: CONTINUOUS
FREQUENCY: INTERMITTENT
FREQUENCY: INTERMITTENT
FREQUENCY: CONTINUOUS
FREQUENCY: CONTINUOUS

## 2024-05-02 ASSESSMENT — PAIN DESCRIPTION - ORIENTATION
ORIENTATION: RIGHT

## 2024-05-02 ASSESSMENT — PAIN DESCRIPTION - LOCATION
LOCATION: FOOT

## 2024-05-02 NOTE — PROGRESS NOTES
Patient admitted to [facility] 4 in preparation for surgery, VSS. Consent confirmed. IV inserted into RFA, NS infusing. Belongings on cart. NPO since midnight.

## 2024-05-02 NOTE — ANESTHESIA PRE PROCEDURE
Department of Anesthesiology  Preprocedure Note       Name:  Igor Ann   Age:  54 y.o.  :  1970                                          MRN:  [id number]         Date:  2024      Surgeon: Surgeon(s):  Corrie Berg MD    Procedure: Procedure(s):  RIGHT FOOT DEBRIDEMENT INCISION AND DRAINAGE, POSSIBLE BONE RESECTION, POSSIBLE GRAFT APPLICATION    Medications prior to admission:   Prior to Admission medications    Medication Sig Start Date End Date Taking? Authorizing Provider   metoprolol succinate (TOPROL XL) 25 MG extended release tablet Take 1 tablet by mouth daily 24   Efren Lawrence MD   sacubitril-valsartan (ENTRESTO) 24-26 MG per tablet Take 1 tablet by mouth 2 times daily 24   Efren Lawrence MD   insulin glargine (LANTUS) 100 UNIT/ML injection vial Inject 15 Units into the skin nightly 24   Gómez Hwang MD   busPIRone (BUSPAR) 10 MG tablet Take 1 tablet by mouth 3 times daily 24   Gómez Hwang MD   apixaban (ELIQUIS) 5 MG TABS tablet Take 1 tablet by mouth 2 times daily    ProviderGerson MD   traZODone (DESYREL) 50 MG tablet Take 1 tablet by mouth daily 23   ProviderGerson MD   DULoxetine (CYMBALTA) 60 MG extended release capsule Take 1 capsule by mouth daily 3/17/23   Jackie Krause MD   pravastatin (PRAVACHOL) 40 MG tablet TAKE 1 TABLET BY MOUTH DAILY 23   Colleen Herrera, APRN - CNP   QUEtiapine (SEROQUEL) 50 MG tablet TAKE 1 TABLET BY MOUTH EVERY EVENING 22   Salvador Lugo MD   Calcium Acetate, Phos Binder, 667 MG CAPS TAKE 1 CAPSULE BY MOUTH THREE TIMES DAILY WITH MEALS  Patient taking differently: Take 2 capsules by mouth 3 times daily 21   Salvador Lugo MD   ipratropium-albuterol (DUONEB) 0.5-2.5 (3) MG/3ML SOLN nebulizer solution Inhale 3 mLs into the lungs every 6 hours as needed for Shortness of Breath 10/15/17   Akash Lo MD       Current medications:

## 2024-05-02 NOTE — H&P
V2.0  History and Physical      Name:  Igor Ann /Age/Sex: 1970  (54 y.o. male)   MRN & CSN:  [id number] & [id number] Encounter Date/Time: 2024 10:44 AM EDT   Location:  OR Pool/NONE PCP: Vonnie Cleveland APRN - NP       Hospital Day: 1    History from:     patient    History of Present Illness:     Chief Complaint: Right foot ulcer    Igor Ann is a 54 y.o. male with pmh of CAD, cardiomyopathy, CHF with systolic dysfunction, hypertension, ESRD on HD, COPD, chronic respiratory failure and known foot ulcer who presents as a direct admit for surgical intervention with podiatry.  Patient is a high risk surgical candidate and admitted rather than have the procedure as an outpatient.  Aside from the wound on his foot patient does had not have any acute complaints today.  Denies chest pain or shortness of breath.  Has had anesthesia in the past and    Assessment and Plan:   Igor Ann is a 54 y.o. male  who presents with Foot ulcer with necrosis of bone, left (HCC)      Right foot wound  Patient recently admitted and seen by podiatry for the same issue.  Was on IV antibiotics at that time.  Completed course of oral.  Margins at that time were good.  Admitted today for further surgical debridement.  Podiatry consulted  Stat labs ordered: CBC, CMP: Will need to ensure labs are  Blood cultures ordered  Will obtain wound culture  Further antibiotics based on above results  Cardiology consulted: Patient is a high risk surgical candidate and will require cardiology clearance prior to surgery.    Chronic respiratory failure  COPD  Not currently in exacerbation.  Continue supplemental oxygen  Continue breathing treatment    CAD  Patient currently on Eliquis.  Has been on DAPT before.  Will confirm current regiment  Appreciate cardiology recommendations  Continue Eliquis and statin and metoprolol  Determine if patient needs antiplatelet with cardiology assistance    Systolic CHF  With patient  pravastatin  40 mg Oral Daily    QUEtiapine  50 mg Oral Nightly    [Held by provider] sacubitril-valsartan  1 tablet Oral BID    traZODone  50 mg Oral Daily    sodium chloride flush  5-40 mL IntraVENous 2 times per day    insulin lispro  0-4 Units SubCUTAneous TID WC    insulin lispro  0-4 Units SubCUTAneous Nightly      Infusions:    sodium chloride      dextrose       PRN Meds: ipratropium 0.5 mg-albuterol 2.5 mg, 1 Dose, Q4H PRN  sodium chloride flush, 5-40 mL, PRN  sodium chloride, , PRN  ondansetron, 4 mg, Q8H PRN   Or  ondansetron, 4 mg, Q6H PRN  polyethylene glycol, 17 g, Daily PRN  acetaminophen, 650 mg, Q6H PRN   Or  acetaminophen, 650 mg, Q6H PRN  glucose, 4 tablet, PRN  dextrose bolus, 125 mL, PRN   Or  dextrose bolus, 250 mL, PRN  glucagon (rDNA), 1 mg, PRN  dextrose, , Continuous PRN        Labs      CBC: No results for input(s): \"WBC\", \"HGB\", \"PLT\" in the last 72 hours.  BMP:  No results for input(s): \"NA\", \"K\", \"CL\", \"CO2\", \"BUN\", \"CREATININE\", \"GLUCOSE\" in the last 72 hours.  Hepatic: No results for input(s): \"AST\", \"ALT\", \"BILITOT\", \"ALKPHOS\" in the last 72 hours.    Invalid input(s): \"ALB\"  Lipids:   Lab Results   Component Value Date/Time    CHOL 134 03/28/2024 11:40 AM    HDL 49 03/28/2024 11:40 AM    TRIG 95 03/28/2024 11:40 AM     Hemoglobin A1C:   Lab Results   Component Value Date/Time    LABA1C 7.4 03/27/2024 12:25 AM     TSH:   Lab Results   Component Value Date/Time    TSH 2.15 03/17/2022 08:33 PM    TSH 2.02 02/25/2022 06:24 AM     Troponin: No results found for: \"TROPONINT\"  Lactic Acid: No results for input(s): \"LACTA\" in the last 72 hours.  BNP: No results for input(s): \"PROBNP\" in the last 72 hours.  UA:  Lab Results   Component Value Date/Time    NITRU Negative 05/01/2023 11:28 AM    COLORU Yellow 05/01/2023 11:28 AM    PHUR 8.0 05/01/2023 11:28 AM    LABCAST 3-5 Hyaline 03/10/2020 01:58 PM    WBCUA 3-5 05/01/2023 11:28 AM    RBCUA None seen 05/01/2023 11:28 AM    MUCUS Rare

## 2024-05-02 NOTE — ANESTHESIA POSTPROCEDURE EVALUATION
Department of Anesthesiology  Postprocedure Note    Patient: Igor Ann  MRN: [id number]  YOB: 1970  Date of evaluation: 5/2/2024    Procedure Summary       Date: 05/02/24 Room / Location: [address]    Anesthesia Start: 1139 Anesthesia Stop: 1238    Procedure: RIGHT FOOT DEBRIDEMENT,  INCISION AND DRAINAGE,  BONE RESECTION,  GRAFT APPLICATION (Right: Foot) Diagnosis: Gangrene of right foot (HCC)    Surgeons: Corrie Berg MD Responsible Provider: Bacilio Kyle MD    Anesthesia Type: MAC ASA Status: 4            Anesthesia Type: MAC    Ernesto Phase I: Ernesto Score: 9    Ernesto Phase II:      Anesthesia Post Evaluation    Comments: Postoperative Anesthesia Note    Name:    Igor Ann  MRN:      [id number]    Patient Vitals in the past 12 hrs:  05/02/24 1437, BP:(!) 140/69, Temp:97.4 °F (36.3 °C), Temp src:Oral, Pulse:67, Resp:16, SpO2:98 %  05/02/24 1405, Pulse:70, Resp:18, SpO2:100 %  05/02/24 1400, Pulse:65, Resp:15, SpO2:100 %  05/02/24 1355, BP:(!) 162/79, Pulse:65, Resp:12, SpO2:99 %  05/02/24 1350, BP:(!) 156/79, Pulse:85, Resp:27, SpO2:100 %  05/02/24 1345, BP:(!) 149/86, Pulse:63, Resp:28, SpO2:99 %  05/02/24 1340, Pulse:71, Resp:12, SpO2:100 %  05/02/24 1335, BP:(!) 140/120, Pulse:71, Resp:14, SpO2:100 %  05/02/24 1330, BP:(!) 141/78, Pulse:70, Resp:14, SpO2:99 %  05/02/24 1320, BP:(!) 156/77, Pulse:63, Resp:11, SpO2:100 %  05/02/24 1315, BP:(!) 161/76, Pulse:88, Resp:15, SpO2:100 %  05/02/24 1310, BP:(!) 159/89, Pulse:66, Resp:24, SpO2:100 %  05/02/24 1305, Pulse:67, Resp:11, SpO2:100 %  05/02/24 1300, Pulse:71, Resp:16, SpO2:95 %  05/02/24 1255, BP:(!) 156/90, Temp:98 °F (36.7 °C), Temp src:Temporal, Pulse:66, Resp:14, SpO2:100 %  05/02/24 1250, Pulse:68, Resp:14, SpO2:100 %  05/02/24 1247, BP:(!) 159/86, Temp:98 °F (36.7 °C), Temp src:Temporal, Pulse:68, Resp:18, SpO2:100 %  05/02/24 1245, BP:(!) 171/86, Pulse:67, Resp:12  05/02/24 1011, BP:(!)

## 2024-05-02 NOTE — CONSULTS
Pharmacy Note  Vancomycin Consult    Igor Ann is a 54 y.o. male started on Vancomycin for Bone & Joint infection; consult received from Dr. Spencer to manage therapy. Also receiving the following antibiotics: Cefepime.    Allergies:  Morphine     Tmax: 98.3 F    Micro:   Date Culture Results   5/2 Surgical; foot In process   5/2 Blood x2 Ordered; not collected  yet     Recent Labs     05/02/24  1100   CREATININE 5.0*     Recent Labs     05/02/24  1100   WBC 9.3     Estimated Creatinine Clearance: 19 mL/min (A) (based on SCr of 5 mg/dL (H)).    Intake/Output Summary (Last 24 hours) at 5/2/2024 1426  Last data filed at 5/2/2024 1157  Gross per 24 hour   Intake 100 ml   Output --   Net 100 ml     Wt Readings from Last 1 Encounters:   05/02/24 98.4 kg (217 lb)       Body mass index is 32.05 kg/m².    Loading dose (critically ill or in ICU, require dialysis or renal replacement therapy): Vancomycin 25 mg/kg IVPB x 1 (maximum 2500 mg).  Maintenance dose: 10-20 mg/kg (maximum: 2000 mg/dose and 4500 mg/day) starting at the next dosing interval determined by renal function  Pulse dose: fluctuating renal function, CAESAR, ESRD  CRRT: 7.5-10 mg/kg q12h   Goal Vancomycin trough: 15-20 mcg/mL   Goal Vancomycin AUC: 400-600     Assessment/Plan:  Will initiate Vancomycin with a one time loading dose of 2500 mg x1. Given history of ESRD on HD, will pulse dose patient; Therapeutic Goal: Trough ~ 15 mg/L. Vancomycin level ordered for tomorrow (5/3) at 0600 prior to dialysis. Timing of trough level will be determined based on culture results, renal function, and clinical response.     Thank you for the consult.    Ofe Larson, PharmD 5/2/2024  2:28 PM    Vancomycin Day 3    No results for input(s): \"VANCOTROUGH\" in the last 72 hours.  Recent Labs     05/04/24  0615   VANCORANDOM 22.2     Recent Labs     05/02/24  1100 05/03/24  0935 05/04/24  0615   CREATININE 5.0* 6.5* 7.3*   Estimated Creatinine Clearance: 13 mL/min

## 2024-05-02 NOTE — BRIEF OP NOTE
Brief Postoperative Note      Patient: Igor Ann  YOB: 1970  MRN: [id number]    Date of Procedure: 5/2/2024    Pre-Op Diagnosis Codes:     * Gangrene of right foot (HCC) [I96]    Post-Op Diagnosis: Same       Procedure:  Incision and drainage below fascia, right foot (CPT 61169)  Metatarsal bone resection, right foot (CPT 13513)  Integra graft application (CPT 40276)    Surgeon: Corrie Berg DPM     Assistant: Surgical Assistant: Vazquez Rudolph    Anesthesia: MAC w/ local    Estimated Blood Loss (mL): less than 100 mL    Complications: None    Specimens: R foot metatarsals to pathology; R foot metatarsal for C&S     Implants: Integra 4 inch x 5 inch       Drains: None    Findings: necrotic and devitalized subcutaneous tissue, fat, and muscle about all metatarsal amputation stumps; no deep space abscess; metatarsal amputation stumps healthy/hard/viable during bone cuts with the sagittal saw; open surgical site measured approximately 10 cm x 7 cm x 0.8 cm deep with metatarsal bone exposure noted; Integra 4 inch x 5 inch applied; okay vascular perfusion     POST OP PLAN: No further OR anticipated this admission from a podiatry standpoint; await metatarsal bone C&S/path to determine abx course going forward, recommend ID consult     Electronically signed by Corrie Berg MD on 5/2/2024 at 12:39 PM

## 2024-05-02 NOTE — PROGRESS NOTES
Pharmacy Note - Renal Dosing and Extended Infusion Beta-Lactam Adjustment    Cefepime 2000mg Q12h for treatment of Bone and Joint Infection. Per [facility] Renal Dose Adjustment Policy and Extended Infusion Beta-Lactam Policy, cefepime will be changed to   1000mg Q12h extended infusion    Estimated Creatinine Clearance: Estimated Creatinine Clearance: 19 mL/min (A) (based on SCr of 5 mg/dL (H)).    BMI: Body mass index is 32.05 kg/m².    Please call with any questions.    Thank you,    Jocelyne Sellers, [facility]

## 2024-05-02 NOTE — CONSULTS
Consult PerfectServed/called to [facility] on 05/02/24 at 2:44 PM Huma Culver

## 2024-05-02 NOTE — CARE COORDINATION
Case Management Assessment  Initial Evaluation    Date/Time of Evaluation: 5/2/2024 10:31 AM  Assessment Completed by: Ce Jung RN    If patient is discharged prior to next notation, then this note serves as note for discharge by case management.    Patient Name: Igor Ann                   YOB: 1970  Diagnosis: foot infection                   Date / Time: 5/2/2024  9:08 AM    Patient Admission Status: Inpatient   Readmission Risk (Low < 19, Mod (19-27), High > 27): Readmission Risk Score: 40.3    Current PCP: Vonnie Cleveland APRN - NP  PCP verified by CM? Yes    Chart Reviewed: Yes      History Provided by: Patient, Medical Record  Patient Orientation: Alert and Oriented    Patient Cognition: Alert    Hospitalization in the last 30 days (Readmission):  Yes    If yes, Readmission Assessment in CM Navigator will be completed.    Advance Directives:      Code Status: Prior   Patient's Primary Decision Maker is: Named in Scanned ACP Document    Primary Decision Maker: Joya Land - Step Child - [phone]    Secondary Decision Maker: Sridevi Salmeron - Brother/Sister - [phone]    Supplemental (Other) Decision Maker: Brianna Reyes - Step Child - [phone]    Discharge Planning:    Patient lives with: Other (Comment) (LTC @ [facility]) Type of Home: Long-Term Care  Primary Care Giver: Other (Comment) (LTC at [facility])  Patient Support Systems include: Spouse/Significant Other, Family Members   Current Financial resources: Medicare, Medicaid  Current community resources: None  Current services prior to admission: None            Current DME: Wheelchair, Walker, Oxygen Therapy (Comment)            Type of Home Care services:  None    ADLS  Prior functional level: Assistance with the following:, Bathing, Dressing, Toileting, Cooking, Housework, Shopping  Current functional level: Assistance with the following:, Bathing, Dressing, Toileting, Cooking, Housework, Shopping    PT AM-PAC:   /24  OT  Igor and his family were provided with a choice of provider and agrees with the discharge plan. Freedom of choice list with basic dialogue that supports the patient's individualized plan of care/goals and shares the quality data associated with the providers was provided to: Patient   Patient Representative Name:       The Patient and/or Patient Representative Agree with the Discharge Plan? Yes    Ce Jung RN  Case Management Department  Ph: [phone] Fax: [phone]

## 2024-05-02 NOTE — CONSULTS
Podiatry Consult Note      Reason for Consult:  right foot necrosis     Chief Complaint:  worsening right foot necrosis, pain, and drainage    History of Present Illness:              The patient is a 54 y.o. male with significant past medical history of ESRD on HD, DMII, and PVD who presents with worsening necrosis and gangrene to his right TMA stump. Pt was just recently admitted and required R TMA 4/4/24. He is admitted today for further I&D w/ bone resection and possible graft application. He voices no complaints at time of encounter.      Past Medical History:        Diagnosis Date    Ambulatory dysfunction     walker for long distances, SOB with distance    Aortic stenosis     echo 2017    Arthritis     hands and hips    Asthma     Bilateral hilar adenopathy syndrome 06/03/2013    CAD (coronary artery disease)     Dr. Javier Chanel [facility]    Cardiomyopathy ([facility]) 04/19/2019    EF= 43%    CHF (congestive heart failure) ([facility])     Chronic pain     COPD (chronic obstructive pulmonary disease) ([facility])     pulmonology Dr. Batista    CVA (cerebral vascular accident) ([facility]) 05/21/2017    Depression     Diabetes mellitus ([facility])     borderline    Difficult intravenous access     Emphysema of lung ([facility])     ESRD (end stage renal disease) on dialysis ([facility])     MWF    Fear of needles     Gastric ulcer     GERD (gastroesophageal reflux disease)     Heart valve problem     bicuspic valve    Hemodialysis patient ([facility])     History of spinal fracture     work incident    Hx of blood clots     Bilateral lower extremities; stents in place    Hyperlipidemia     Hypertension     MI (myocardial infarction) ([facility]) 2019    has had 9 MIs. 2019 was the last    Neuromuscular disorder ([facility])     due to CVA    Numbness and tingling in left arm     from fistula    Pneumonia     PONV (postoperative nausea and vomiting)     Prolonged emergence from general anesthesia     States requires more medication than most people    Sleep apnea     Uses CPAP     Stroke (HCC)     7mm thalamic cva 2017 deficts left side, left side weakness    TIA (transient ischemic attack)     Unspecified diseases of blood and blood-forming organs        Past Surgical History:        Procedure Laterality Date    AORTIC VALVE REPLACEMENT N/A 10/15/2019    TRANSCATHETER AORTIC VALVE REPLACEMENT FEMORAL APPROACH performed by Dino Holland MD at [facility] CVOR    CATHETER REMOVAL Right 7/2/2019    PERITONEAL DIALYSIS CATHETER REMOVAL performed by Remi Kincaid MD at [facility] OR    COLONOSCOPY      COLONOSCOPY  2/29/2015    WNL    CORONARY ANGIOPLASTY WITH STENT PLACEMENT  05/26/15    CYST REMOVAL  08/14/2013    EXCISION CYSTS, NECK X2 AND ABDOMINAL benign    DIAGNOSTIC CARDIAC CATH LAB PROCEDURE      DIALYSIS FISTULA CREATION Left 10/30/2017    LEFT BRACHIAL CEPHALIC FISTULA    DIALYSIS FISTULA CREATION Left 3/27/2019    LIGATION  AV FISTULA performed by Brenden Rosario MD at [facility] OR    ENDOSCOPY, COLON, DIAGNOSTIC      FOOT DEBRIDEMENT Right 5/26/2023    INCISION AND DEBRIDEMENT RIGHT FOOT WITH performed by Cely Rodriguez DPM at [facility] OR    FOOT SURGERY Right 5/26/2023    RIGHT FIFTH RAY AMPUTATION performed by Cely Rodriguez DPM at [facility] OR    FOOT SURGERY Right 4/4/2024    RIGHT TRANSMETATARSAL AMPUTATION performed by Corrie Berg MD at [facility] OR    IR TUNNELED CATHETER PLACEMENT GREATER THAN 5 YEARS  3/21/2022    IR TUNNELED CATHETER PLACEMENT GREATER THAN 5 YEARS 3/21/2022 [facility] SPECIAL PROCEDURES    IR TUNNELED CATHETER PLACEMENT GREATER THAN 5 YEARS  4/21/2022    IR TUNNELED CATHETER PLACEMENT GREATER THAN 5 YEARS 4/21/2022 [facility] SPECIAL PROCEDURES    IR TUNNELED CATHETER PLACEMENT GREATER THAN 5 YEARS  4/26/2022    IR TUNNELED CATHETER PLACEMENT GREATER THAN 5 YEARS 4/26/2022 [facility] SPECIAL PROCEDURES    IR TUNNELED CATHETER PLACEMENT GREATER THAN 5 YEARS  6/23/2022    IR TUNNELED CATHETER PLACEMENT GREATER THAN 5 YEARS 6/23/2022 [facility] SPECIAL PROCEDURES    OTHER SURGICAL

## 2024-05-02 NOTE — PLAN OF CARE
Problem: Chronic Conditions and Co-morbidities  Goal: Patient's chronic conditions and co-morbidity symptoms are monitored and maintained or improved  Outcome: Progressing     Problem: Discharge Planning  Goal: Discharge to home or other facility with appropriate resources  Outcome: Progressing     Problem: Pain  Goal: Verbalizes/displays adequate comfort level or baseline comfort level  Outcome: Progressing     Problem: Safety - Adult  Goal: Free from fall injury  Outcome: Progressing     Problem: ABCDS Injury Assessment  Goal: Absence of physical injury  Outcome: Progressing     Problem: Skin/Tissue Integrity  Goal: Absence of new skin breakdown  Description: 1.  Monitor for areas of redness and/or skin breakdown  2.  Assess vascular access sites hourly  3.  Every 4-6 hours minimum:  Change oxygen saturation probe site  4.  Every 4-6 hours:  If on nasal continuous positive airway pressure, respiratory therapy assess nares and determine need for appliance change or resting period.  Outcome: Progressing

## 2024-05-02 NOTE — OP NOTE
Operative Note      Patient: Igor Ann  YOB: 1970  MRN: [id number]    Date of Procedure: 5/2/2024    Pre-Op Diagnosis: Gangrene, right foot; PVD; DMII; ulcer, right midfoot with necrosis of muscle; absence of right foot   Post-Op Diagnosis: Same       Procedure:  Incision and drainage below fascia, right foot (CPT 95885)  Metatarsal bone resection, right foot (CPT 91265)  Integra graft application (CPT 23823)    Surgeon: Corrie Berg DPM  Assistant: Surgical Assistant: Vazquez Rudolph    Anesthesia: MAC w/ local    Injections: 26 mL 0.5% marcaine plain   Materials: Integra 4 inch x 5 inch, 2-0 vicryl, staples  Tourniquet: None    Estimated Blood Loss (mL): less than 100 mL  Complications: None    Specimens: R metatarsal bone for C&S; R metatarsals to pathology     Findings: necrotic and devitalized subcutaneous tissue, fat, and muscle about all metatarsal amputation stumps; no deep space abscess; metatarsal amputation stumps healthy/hard/viable during bone cuts with the sagittal saw; open surgical site measured approximately 10 cm x 7 cm x 0.8 cm deep with metatarsal bone exposure noted; Integra 4 inch x 5 inch applied; okay vascular perfusion     Indications for Procedure: Igor Ann, is a 54 year old male who has a worsening necrotic, gangrenous area to his distal right transmetatarsal amputation stump recalcitrant to conservative therapy. It was determined patient would benefit from above surgical intervention for further attempt at limb salvage. All risks (including but not limited to infection, blood loss, blood clots such as DVT and PE, injuries to nerves, vessels, tendons, ligaments, bone and other structures, increased pain, stiffness, significant scarring, and recurrence with the need for additional surgery), complications, benefits, and alternatives were explained to the patient in detail. Patient acknowledges and understands. No promises or guarantees were made. Patient  wishes to proceed with the above mentioned procedure(s).      Description of Procedure: Igor Ann was brought into the operating room from the pre-operative area and placed on the operating room table in the supine position. Once MAC anesthesia was obtained, a timeout was performed to identify the correct patient, procedure, and site then 26 mL of 0.5% marcaine plain was injected in a proximal ankle block fashion. The extremity was scrubbed, prepped, and draped in the usual sterile manner. No tourniquet was applied. Attention was then drawn to the patient's right necrotic, gangrenous transmetatarsal amputation stump and the following procedures were performed.     Incision and drainage below fascia, right foot (CPT 11224)     Utilizing a #15 blade, an incision was made along the distal right TMA stump to sharply excise a large swath of necrotic, gangrenous skin in toto. This incision was full thickness in nature. All bleeders were cauterized and ligated as necessary. Necrotic and devitalized subcutaneous tissue, fat, deep fascia, and muscle was noted to the distal right circumferential transmetatarsal amputation stump. Deep space abscess was not encountered. All necrotic and devitalized subcutaneous tissue, fat, deep fascia, and muscle was sharply excised from the distal circumferential right transmetatarsal amputation stump using scissors and a rongeur until healthy bleeding tissue was obtained.     2. Metatarsal bone resection, right foot (CPT 10235)     Next, a sagittal saw was utilized to osteotomize the shaft of the remaining metatarsal amputation stumps. These metatarsal amputation stump bones were noted to be healthy, hard, and viable during bone cuts. The metatarsal amputation stump bones were then sharply excised, passed from the operative field, and sent for both culture/sensitivity as well as to pathology for gross microscopic identification. The surgical site was then copiously irrigated with

## 2024-05-02 NOTE — CONSULTS
Infectious Diseases   Consult Note      Reason for Consult:  OM foot    Requesting Physician:  Dr. Spencer      Date of Admission: 5/2/2024  Subjective:   CHIEF COMPLAINT:   none given       HPI:    Igor Ann is a 54yoM with history of CAD, CM, GERD, HTN, COPD  ESRD on HD MWF via L chest TDC.                Admitted 3/27/24 with acute on chronic respiratory failure / volume overload.    Noted to have chronic wounds R foot and has been seen by Podiatry and Vascular.  Ultimately had R foot TMA on 4/4/24  Operative culture with MRSA   Path on the clearance fragment was negative for residual OM.    Operative culture was positive for MRSA.  Recommendation was given for 7d course of doxycycline.    He is readmitted today 5/2/24 with progressive necrosis and gangrene to the TMA.  OR today - I&D, resection of mets, graft application.   Culture, path pending.    He is a poor historian and does not contribute to the history.  He denies recent fever, chills.        Current abx:  Vanc by level per pharmacy   Cefepime 1g q12       Past Surgical History:       Diagnosis Date    Ambulatory dysfunction     walker for long distances, SOB with distance    Aortic stenosis     echo 2017    Arthritis     hands and hips    Asthma     Bilateral hilar adenopathy syndrome 06/03/2013    CAD (coronary artery disease)     Dr. Javier Chanel [facility]    Cardiomyopathy ([facility]) 04/19/2019    EF= 43%    CHF (congestive heart failure) ([facility])     Chronic pain     COPD (chronic obstructive pulmonary disease) ([facility])     pulmonology Dr. Batista    CVA (cerebral vascular accident) ([facility]) 05/21/2017    Depression     Diabetes mellitus ([facility])     borderline    Difficult intravenous access     Emphysema of lung ([facility])     ESRD (end stage renal disease) on dialysis ([facility])     MWF    Fear of needles     Gastric ulcer     GERD (gastroesophageal reflux disease)     Heart valve problem     bicuspic valve    Hemodialysis patient ([facility])     History of spinal

## 2024-05-02 NOTE — PROGRESS NOTES
D: Patient here from OR via bed taken to bay 7, all current drips, treatments, skin issues, and plan of care were reviewed by both RN, patient transferred in stable condition, patient is awake. Alert, and oriented x 4, C/O of 9/10 pain in right foot, described as a throbbing, stabbing pain, call light is in reach. A: Medicated per doctors orders, see MAR, assessment completed and documented, discussed plan of care with patient who agreed.

## 2024-05-02 NOTE — PROGRESS NOTES
4 Eyes Skin Assessment     NAME:  Igor Ann  YOB: 1970  MEDICAL RECORD NUMBER:  [id number]    The patient is being assessed for  Admission    I agree that at least one RN has performed a thorough Head to Toe Skin Assessment on the patient. ALL assessment sites listed below have been assessed.      Areas assessed by both nurses:    Head, Face, Ears, Shoulders, Back, Chest, Arms, Elbows, Hands, Sacrum. Buttock, Coccyx, Ischium, Legs. Feet and Heels, and Under Medical Devices         Does the Patient have a Wound? Yes wound(s) were present on assessment. LDA wound assessment was Initiated and completed by RN       Isaac Prevention initiated by RN: Yes  Wound Care Orders initiated by RN: Yes    Pressure Injury (Stage 3,4, Unstageable, DTI, NWPT, and Complex wounds) if present, place Wound referral order by RN under : Yes    New Ostomies, if present place, Ostomy referral order under : No     2nd toe healing venous ulcer.    Nurse 1 eSignature: Electronically signed by Regina Piña RN on 5/2/24 at 6:33 PM EDT    **SHARE this note so that the co-signing nurse can place an eSignature**    Nurse 2 eSignature: Electronically signed by Angelique Mayberry RN on 5/2/24 at 6:35 PM EDT

## 2024-05-03 LAB
ANION GAP SERPL CALCULATED.3IONS-SCNC: 11 MMOL/L (ref 3–16)
BASOPHILS # BLD: 0.1 K/UL (ref 0–0.2)
BASOPHILS NFR BLD: 1.3 %
BUN SERPL-MCNC: 52 MG/DL (ref 7–20)
CALCIUM SERPL-MCNC: 9.3 MG/DL (ref 8.3–10.6)
CHLORIDE SERPL-SCNC: 91 MMOL/L (ref 99–110)
CO2 SERPL-SCNC: 29 MMOL/L (ref 21–32)
CREAT SERPL-MCNC: 6.5 MG/DL (ref 0.9–1.3)
CRP SERPL-MCNC: 47 MG/L (ref 0–5.1)
DEPRECATED RDW RBC AUTO: 16.7 % (ref 12.4–15.4)
EOSINOPHIL # BLD: 0.6 K/UL (ref 0–0.6)
EOSINOPHIL NFR BLD: 7.8 %
GFR SERPLBLD CREATININE-BSD FMLA CKD-EPI: 9 ML/MIN/{1.73_M2}
GLUCOSE BLD-MCNC: 122 MG/DL (ref 70–99)
GLUCOSE BLD-MCNC: 139 MG/DL (ref 70–99)
GLUCOSE BLD-MCNC: 151 MG/DL (ref 70–99)
GLUCOSE BLD-MCNC: 188 MG/DL (ref 70–99)
GLUCOSE SERPL-MCNC: 146 MG/DL (ref 70–99)
HCT VFR BLD AUTO: 27.2 % (ref 40.5–52.5)
HGB BLD-MCNC: 8.5 G/DL (ref 13.5–17.5)
LOEFFLER MB STN SPEC: NORMAL
LYMPHOCYTES # BLD: 0.7 K/UL (ref 1–5.1)
LYMPHOCYTES NFR BLD: 8.6 %
MCH RBC QN AUTO: 26.8 PG (ref 26–34)
MCHC RBC AUTO-ENTMCNC: 31.2 G/DL (ref 31–36)
MCV RBC AUTO: 86.1 FL (ref 80–100)
MONOCYTES # BLD: 0.5 K/UL (ref 0–1.3)
MONOCYTES NFR BLD: 7.1 %
NEUTROPHILS # BLD: 5.9 K/UL (ref 1.7–7.7)
NEUTROPHILS NFR BLD: 75.2 %
PERFORMED ON: ABNORMAL
PHOSPHATE SERPL-MCNC: 2.9 MG/DL (ref 2.5–4.9)
PLATELET # BLD AUTO: 296 K/UL (ref 135–450)
PMV BLD AUTO: 7.5 FL (ref 5–10.5)
POTASSIUM SERPL-SCNC: 4.8 MMOL/L (ref 3.5–5.1)
RBC # BLD AUTO: 3.16 M/UL (ref 4.2–5.9)
SODIUM SERPL-SCNC: 131 MMOL/L (ref 136–145)
WBC # BLD AUTO: 7.8 K/UL (ref 4–11)

## 2024-05-03 PROCEDURE — 6360000002 HC RX W HCPCS: Performed by: STUDENT IN AN ORGANIZED HEALTH CARE EDUCATION/TRAINING PROGRAM

## 2024-05-03 PROCEDURE — 6370000000 HC RX 637 (ALT 250 FOR IP): Performed by: STUDENT IN AN ORGANIZED HEALTH CARE EDUCATION/TRAINING PROGRAM

## 2024-05-03 PROCEDURE — 1200000000 HC SEMI PRIVATE

## 2024-05-03 PROCEDURE — 94761 N-INVAS EAR/PLS OXIMETRY MLT: CPT

## 2024-05-03 PROCEDURE — 99231 SBSQ HOSP IP/OBS SF/LOW 25: CPT | Performed by: INTERNAL MEDICINE

## 2024-05-03 PROCEDURE — 86140 C-REACTIVE PROTEIN: CPT

## 2024-05-03 PROCEDURE — 36415 COLL VENOUS BLD VENIPUNCTURE: CPT

## 2024-05-03 PROCEDURE — 2580000003 HC RX 258: Performed by: STUDENT IN AN ORGANIZED HEALTH CARE EDUCATION/TRAINING PROGRAM

## 2024-05-03 PROCEDURE — 80048 BASIC METABOLIC PNL TOTAL CA: CPT

## 2024-05-03 PROCEDURE — 2700000000 HC OXYGEN THERAPY PER DAY

## 2024-05-03 PROCEDURE — 84100 ASSAY OF PHOSPHORUS: CPT

## 2024-05-03 PROCEDURE — 85025 COMPLETE CBC W/AUTO DIFF WBC: CPT

## 2024-05-03 RX ADMIN — OXYCODONE HYDROCHLORIDE 10 MG: 5 TABLET ORAL at 09:09

## 2024-05-03 RX ADMIN — PRAVASTATIN SODIUM 40 MG: 40 TABLET ORAL at 20:24

## 2024-05-03 RX ADMIN — OXYCODONE HYDROCHLORIDE 10 MG: 5 TABLET ORAL at 20:31

## 2024-05-03 RX ADMIN — BUSPIRONE HYDROCHLORIDE 10 MG: 5 TABLET ORAL at 09:09

## 2024-05-03 RX ADMIN — INSULIN GLARGINE 15 UNITS: 100 INJECTION, SOLUTION SUBCUTANEOUS at 20:24

## 2024-05-03 RX ADMIN — TRAZODONE HYDROCHLORIDE 50 MG: 50 TABLET ORAL at 20:24

## 2024-05-03 RX ADMIN — SODIUM CHLORIDE, PRESERVATIVE FREE 10 ML: 5 INJECTION INTRAVENOUS at 09:09

## 2024-05-03 RX ADMIN — APIXABAN 5 MG: 5 TABLET, FILM COATED ORAL at 12:04

## 2024-05-03 RX ADMIN — HYDROMORPHONE HYDROCHLORIDE 1 MG: 1 INJECTION, SOLUTION INTRAMUSCULAR; INTRAVENOUS; SUBCUTANEOUS at 12:09

## 2024-05-03 RX ADMIN — METOPROLOL SUCCINATE 25 MG: 25 TABLET, FILM COATED, EXTENDED RELEASE ORAL at 09:09

## 2024-05-03 RX ADMIN — SACUBITRIL AND VALSARTAN 1 TABLET: 24; 26 TABLET, FILM COATED ORAL at 20:24

## 2024-05-03 RX ADMIN — SACUBITRIL AND VALSARTAN 1 TABLET: 24; 26 TABLET, FILM COATED ORAL at 09:09

## 2024-05-03 RX ADMIN — BUSPIRONE HYDROCHLORIDE 10 MG: 5 TABLET ORAL at 15:13

## 2024-05-03 RX ADMIN — BUSPIRONE HYDROCHLORIDE 10 MG: 5 TABLET ORAL at 20:24

## 2024-05-03 RX ADMIN — APIXABAN 5 MG: 5 TABLET, FILM COATED ORAL at 20:24

## 2024-05-03 RX ADMIN — DULOXETINE HYDROCHLORIDE 60 MG: 60 CAPSULE, DELAYED RELEASE ORAL at 09:09

## 2024-05-03 RX ADMIN — QUETIAPINE FUMARATE 50 MG: 25 TABLET ORAL at 20:24

## 2024-05-03 RX ADMIN — CEFEPIME 1000 MG: 1 INJECTION, POWDER, FOR SOLUTION INTRAMUSCULAR; INTRAVENOUS at 20:27

## 2024-05-03 RX ADMIN — OXYCODONE HYDROCHLORIDE 10 MG: 5 TABLET ORAL at 15:12

## 2024-05-03 ASSESSMENT — PAIN - FUNCTIONAL ASSESSMENT
PAIN_FUNCTIONAL_ASSESSMENT: PREVENTS OR INTERFERES SOME ACTIVE ACTIVITIES AND ADLS
PAIN_FUNCTIONAL_ASSESSMENT: PREVENTS OR INTERFERES SOME ACTIVE ACTIVITIES AND ADLS
PAIN_FUNCTIONAL_ASSESSMENT: PREVENTS OR INTERFERES WITH MANY ACTIVE NOT PASSIVE ACTIVITIES

## 2024-05-03 ASSESSMENT — PAIN DESCRIPTION - ORIENTATION
ORIENTATION: RIGHT
ORIENTATION: RIGHT
ORIENTATION: RIGHT;MID
ORIENTATION_2: RIGHT
ORIENTATION: RIGHT

## 2024-05-03 ASSESSMENT — PAIN DESCRIPTION - DESCRIPTORS
DESCRIPTORS: ACHING
DESCRIPTORS_2: ACHING
DESCRIPTORS: ACHING
DESCRIPTORS: ACHING
DESCRIPTORS: ACHING;CRUSHING;DISCOMFORT

## 2024-05-03 ASSESSMENT — PAIN DESCRIPTION - LOCATION
LOCATION: FOOT
LOCATION: FOOT
LOCATION_2: TOE (COMMENT WHICH ONE)
LOCATION: BACK;FOOT
LOCATION: FOOT

## 2024-05-03 ASSESSMENT — PAIN DESCRIPTION - INTENSITY: RATING_2: 5

## 2024-05-03 ASSESSMENT — PAIN SCALES - GENERAL
PAINLEVEL_OUTOF10: 5
PAINLEVEL_OUTOF10: 0
PAINLEVEL_OUTOF10: 9
PAINLEVEL_OUTOF10: 8
PAINLEVEL_OUTOF10: 5
PAINLEVEL_OUTOF10: 7
PAINLEVEL_OUTOF10: 7
PAINLEVEL_OUTOF10: 5

## 2024-05-03 ASSESSMENT — PAIN SCALES - WONG BAKER
WONGBAKER_NUMERICALRESPONSE: HURTS WHOLE LOT
WONGBAKER_NUMERICALRESPONSE: NO HURT
WONGBAKER_NUMERICALRESPONSE: HURTS WHOLE LOT
WONGBAKER_NUMERICALRESPONSE: HURTS WHOLE LOT

## 2024-05-03 ASSESSMENT — PAIN DESCRIPTION - FREQUENCY: FREQUENCY: INTERMITTENT

## 2024-05-03 ASSESSMENT — PAIN DESCRIPTION - PAIN TYPE
TYPE: ACUTE PAIN;SURGICAL PAIN
TYPE: ACUTE PAIN;SURGICAL PAIN

## 2024-05-03 ASSESSMENT — PAIN DESCRIPTION - ONSET: ONSET: ON-GOING

## 2024-05-03 NOTE — PROGRESS NOTES
Infectious Disease Follow up Notes    CC :  non-healing TMA wound      Antibiotics:  Cefepime 1g q24  Vanc per pharmacy dosing      Admit Date:   5/2/2024  Hospital Day: 2    Subjective:   He remains AF   Uneventful night     Objective:     Patient Vitals for the past 8 hrs:   BP Temp Temp src Pulse Resp SpO2   05/03/24 0905 (!) 144/80 97.5 °F (36.4 °C) Oral 79 18 93 %   05/03/24 0343 131/66 98.1 °F (36.7 °C) Oral 75 16 94 %       EXAM:  Resting comfortably   NAD   Exposed skin without rash  Bulky dressing R foot         LINE:   PIV in place     Admission photo                Scheduled Meds:   [Held by provider] apixaban  5 mg Oral BID    busPIRone  10 mg Oral TID    DULoxetine  60 mg Oral Daily    insulin glargine  15 Units SubCUTAneous Nightly    metoprolol succinate  25 mg Oral Daily    pravastatin  40 mg Oral Nightly    QUEtiapine  50 mg Oral Nightly    traZODone  50 mg Oral Daily    sodium chloride flush  5-40 mL IntraVENous 2 times per day    insulin lispro  0-4 Units SubCUTAneous TID WC    insulin lispro  0-4 Units SubCUTAneous Nightly    sacubitril-valsartan  1 tablet Oral BID    vancomycin (VANCOCIN) intermittent dosing (placeholder)   Other RX Placeholder    [START ON 5/4/2024] epoetin siddharth-epbx  5,000 Units IntraVENous Once per day on Tue Thu Sat    cefepime  1,000 mg IntraVENous Q24H       Continuous Infusions:   sodium chloride 25 mL (05/02/24 1601)    dextrose            Data Review:    Lab Results   Component Value Date    WBC 7.8 05/03/2024    HGB 8.5 (L) 05/03/2024    HCT 27.2 (L) 05/03/2024    MCV 86.1 05/03/2024     05/03/2024     Lab Results   Component Value Date    CREATININE 6.5 (HH) 05/03/2024    BUN 52 (H) 05/03/2024     (L) 05/03/2024    K 4.8 05/03/2024    CL 91 (L) 05/03/2024    CO2 29 05/03/2024       Hepatic Function Panel:   Lab Results   Component Value Date/Time    ALKPHOS 167 05/02/2024  4/4/24  IMPRESSION:  1. Deep soft tissue ulceration lateral to the 4th metatarsal head with  underlying cellulitis.  No abscess identified.  2. Bone marrow edema and confluent decreased T1 signal in the 4th metatarsal  head tapering into the distal shaft compatible with osteomyelitis.  3. Marrow edema with mildly decreased T1 signal in the 4th proximal  phalangeal base compatible with noninfectious reactive osteitis versus early  osteomyelitis.  4. Mild subcortical edema in the lateral aspect of the 3rd metatarsal head  and mild marrow edema with normal T1 signal in the 3rd proximal phalangeal  base likely representing noninfectious reactive osteitis versus less likely  early osteomyelitis.        Assessment:     Patient Active Problem List    Diagnosis Date Noted    Hypotension due to drugs 11/25/2017     Priority: High    Chronic combined systolic and diastolic heart failure ([facility])      Priority: High    Ischemic cardiomyopathy      Priority: High    Dyslipidemia      Priority: High    Type 2 DM with hypertension and ESRD on dialysis ([facility]) 03/04/2014     Priority: High    Bicuspid aortic valve 06/03/2013     Priority: High    CHF (congestive heart failure) ([facility]) 05/14/2013     Priority: High    Coronary artery disease involving native coronary artery of native heart 05/14/2013     Priority: High    PVD (peripheral vascular disease) ([facility]) 05/14/2013     Priority: High    Positive blood culture 03/13/2023     Priority: Medium    Severe sepsis ([facility]) 03/10/2023     Priority: Medium    Hypertensive urgency 01/30/2023     Priority: Medium    Acute respiratory failure with hypoxemia ([facility]) 12/25/2022     Priority: Medium    Hyperkalemia 12/25/2022     Priority: Medium    Hyponatremia 12/25/2022     Priority: Medium    Metabolic acidemia 12/25/2022     Priority: Medium    Pulmonary infiltrate 12/25/2022     Priority: Medium    Leukocytosis 12/25/2022     Priority: Medium    Type 2 myocardial infarction ([facility]) 12/08/2022

## 2024-05-03 NOTE — PROGRESS NOTES
[facility]  Nephrology f/u Note           Reason for Consult:  ESRD   Requesting Physician:  Dr. Spencer    Sub/interval history  Resting    ROS: No chest pain/shortness of breath/fever/nausea/vomiting  PSFH: No visitor    Scheduled Meds:   apixaban  5 mg Oral BID    busPIRone  10 mg Oral TID    DULoxetine  60 mg Oral Daily    insulin glargine  15 Units SubCUTAneous Nightly    metoprolol succinate  25 mg Oral Daily    pravastatin  40 mg Oral Nightly    QUEtiapine  50 mg Oral Nightly    traZODone  50 mg Oral Daily    sodium chloride flush  5-40 mL IntraVENous 2 times per day    insulin lispro  0-4 Units SubCUTAneous TID WC    insulin lispro  0-4 Units SubCUTAneous Nightly    sacubitril-valsartan  1 tablet Oral BID    vancomycin (VANCOCIN) intermittent dosing (placeholder)   Other RX Placeholder    [START ON 5/4/2024] epoetin siddharth-epbx  5,000 Units IntraVENous Once per day on Tue Thu Sat    cefepime  1,000 mg IntraVENous Q24H     Continuous Infusions:   sodium chloride 25 mL (05/02/24 1601)    dextrose       PRN Meds:.ipratropium 0.5 mg-albuterol 2.5 mg, sodium chloride flush, sodium chloride, ondansetron **OR** ondansetron, polyethylene glycol, acetaminophen **OR** acetaminophen, glucose, dextrose bolus **OR** dextrose bolus, glucagon (rDNA), dextrose, HYDROmorphone, oxyCODONE    History of Present Illness on 5/2/24:    54 y.o. yo male with PMH of  ESRD, CAD, CHF, COPD, HTN, DM, CVA and HTN  who is admitted after right foot I n d and graft placement  He underwent HD at his unit in [place] where he is currently for 2h with 2.3l net UF, post wt was 97.7 kg     Physical exam:   Constitutional:  VITALS:  BP (!) 144/80   Pulse 83   Temp 97.7 °F (36.5 °C) (Oral)   Resp 18   Ht 1.753 m (5' 9\")   Wt 98.4 kg (217 lb)   SpO2 93%   BMI 32.05 kg/m²   Gen: alert, awake  Neck: No JVD  Skin: Unremarkable  Cardiovascular:  S1, S2 without m/r/g   Respiratory: CTA B without w/r/r; respiratory effort normal  Abdomen:   soft, nt, nd,   Extremities: no lower extremity edema; right leg wrapped w ace wrap  Neuro/Psy: AAoriented times 3 ; moves all 4 ext  Access: left IJ TDC    Data/  Recent Labs     05/02/24  1100 05/03/24  0935   WBC 9.3 7.8   HGB 9.7* 8.5*   HCT 30.6* 27.2*   MCV 86.5 86.1    296       Recent Labs     05/02/24  1100 05/03/24  0935   * 131*   K 4.2 4.8   CL 91* 91*   CO2 34* 29   GLUCOSE 166* 146*   PHOS  --  2.9   BUN 40* 52*   CREATININE 5.0* 6.5*   LABGLOM 13* 9*         Assessment  -ESRD on HD TTS at [facility]   OP TW 95 kg     -Gangrene Right foot s/p I n D, metatarsal bone resection w integra graft on 5/2/24    -Anemia    -Hyponatremia    -HTN    -CKD/MBD    Plan  -HD per TTS schedule , next on 5/4  -OP TW of 95 kg  -renal diet w FR 1l/day  -retacrit 5K with HD   -renal dose meds  -cont Metoprolol, and entresto     Thank you for the consultation.  Please do not hesitate to call with questions.    Cristine Mckenzie MD  Office: [phone]  Fax:    [phone]  [facility]

## 2024-05-03 NOTE — PROGRESS NOTES
Physical Therapy    PT order received, chart reviewed. Attempted to see pt for initial PT evaluation however pt declining to participate d/t pain. Educated pt on the importance of mobility and participation however pt continues to refuse at this time.  Will follow up with pt for initial PT evaluation as schedule allows.  Thank you,  Grace Ang, PT, DPT

## 2024-05-03 NOTE — PLAN OF CARE
Problem: Safety - Adult  Goal: Free from fall injury  5/3/2024 0936 by Radha Tavares, RN  Outcome: Progressing  5/2/2024 2234 by Danica Reaves, RN  Outcome: Progressing

## 2024-05-03 NOTE — PROGRESS NOTES
Comprehensive Nutrition Assessment    Type and Reason for Visit:  Initial, Positive Nutrition Screen    Nutrition Recommendations/Plan:   Continue NIDA, low potassium diet and encourage PO intake   RD to add nepro BID   FR per MD   RD to order new updated weigth   Monitor nutrition adequacy, pertinent labs, bowel habits, wt changes, and clinical progress     Malnutrition Assessment:  Malnutrition Status:  At risk for malnutrition (Comment) (05/03/24 1257)    Context:  Acute Illness     Findings of the 6 clinical characteristics of malnutrition:  Energy Intake:  Mild decrease in energy intake (Comment)  Fluid Accumulation:  Mild      Nutrition Assessment:    Positive nutrition screen for wt loss and poor intake: 54 y.o. m w/ pmh of CAD, cardiomyopathy, CHF, HTN, ESRD on HD, COPD, chronic respiratory failure and known foot ulcer admitted w/ surgical intervention with podiatry. S/p right foot debridement I&D, bone resection, graft application yesterday. On NIDA, low potassium diet w/ 1000 ml FR no intakes documented. Pt unavailable after multiple attempts today. RONY wt changes in EMR d/t stated weight, RD to order new updated weight. Pt has had nepro at previous visits, RD to add BID. Will provide diet education when appropriate. Continue to encourage PO intake, will continue to monitor.    Nutrition Related Findings:    LLE trace edema. -268 x 24 hours. Na 131. Cr 6.5 GFR 9. No BM Wound Type: Surgical Incision, Diabetic Ulcer       Current Nutrition Intake & Therapies:    Average Meal Intake: Unable to assess  Average Supplements Intake: None Ordered  ADULT DIET; Regular; No Added Salt (3-4 gm); Low Potassium (Less than 3000 mg/day); 1000 ml  ADULT ORAL NUTRITION SUPPLEMENT; Breakfast, Dinner; Renal Oral Supplement    Anthropometric Measures:  Height: 175.3 cm (5' 9\")  Ideal Body Weight (IBW): 160 lbs (73 kg)       Current Body Weight: 98.4 kg (217 lb), 135.6 % IBW. Weight Source: Stated  Current BMI (kg/m2): 32

## 2024-05-03 NOTE — PROGRESS NOTES
Podiatric Surgery Daily Progress Note  Igor Ann  Subjective :   Patient seen and examined at the bedside. Patient denies any acute overnight events although reports significant pain to the surgical site. Patient denies N/V/F/C/SOB. Patient denies calf pain, thigh pain, chest pain.        Objective     BP (!) 181/89   Pulse 83   Temp 97.4 °F (36.3 °C) (Oral)   Resp 18   Ht 1.753 m (5' 9\")   Wt 98.4 kg (217 lb)   SpO2 97%   BMI 32.05 kg/m²      I/O:  Intake/Output Summary (Last 24 hours) at 5/4/2024 0951  Last data filed at 5/4/2024 0609  Gross per 24 hour   Intake --   Output 100 ml   Net -100 ml              Wt Readings from Last 3 Encounters:   05/02/24 98.4 kg (217 lb)   04/11/24 98.6 kg (217 lb 6 oz)   01/24/24 85.3 kg (188 lb)       LABS:    Recent Labs     05/03/24  0935 05/04/24  0615   WBC 7.8 7.9   HGB 8.5* 8.7*   HCT 27.2* 26.9*    269        Recent Labs     05/03/24  0935 05/04/24  0615   * 130*   K 4.8 5.2*   CL 91* 91*   CO2 29 27   PHOS 2.9  --    BUN 52* 63*   CREATININE 6.5* 7.3*      No results for input(s): \"PROT\", \"INR\", \"APTT\" in the last 72 hours.        LOWER EXTREMITY EXAMINATION    Integument:  Skin is warm, dry and supple, bilateral. Integra graft intact and in place to right guillotine TMA site. No drainage, no SOI.        Neurologic:  Protective sensation is grossly diminished to light touch, bilateral.  Vascular:  DP and PT pulses are nonpalpable, bilateral.  Musculoskeletal:  Patient has 5/5 strength on inversion/everison/dorsiflexion/plantarflexion, bilateral.  No gross musculoskeletal deformities noted. Previous R TMA       Imaging: None required currently      Impression/Recommendations:    The patient is a pleasant 54 y.o. male with:  #Gangrene, right foot  #Ulcer, right midfoot with necrosis of muscle  #DMII  #PVD  #s/p R foot I&D with MT bone resection and Integra graft application (DOS 5/2/24)  - Pt doing well postop, Integra graft intact w/ no SOI.

## 2024-05-03 NOTE — PLAN OF CARE
Problem: Chronic Conditions and Co-morbidities  Goal: Patient's chronic conditions and co-morbidity symptoms are monitored and maintained or improved  5/2/2024 2234 by Danica Reaves RN  Outcome: Progressing  5/2/2024 1707 by Regina Piña RN  Outcome: Progressing     Problem: Discharge Planning  Goal: Discharge to home or other facility with appropriate resources  5/2/2024 2234 by Danica Reaves RN  Outcome: Progressing  5/2/2024 1707 by Regina Piña RN  Outcome: Progressing     Problem: Pain  Goal: Verbalizes/displays adequate comfort level or baseline comfort level  5/2/2024 2234 by Danica Reaves RN  Outcome: Progressing  5/2/2024 1707 by Regina Piña RN  Outcome: Progressing     Problem: Safety - Adult  Goal: Free from fall injury  5/2/2024 2234 by Danica Reaves RN  Outcome: Progressing  5/2/2024 1707 by Regina Piña RN  Outcome: Progressing     Problem: ABCDS Injury Assessment  Goal: Absence of physical injury  5/2/2024 2234 by Danica Reaves RN  Outcome: Progressing  5/2/2024 1707 by Regina Piña RN  Outcome: Progressing     Problem: Skin/Tissue Integrity  Goal: Absence of new skin breakdown  Description: 1.  Monitor for areas of redness and/or skin breakdown  2.  Assess vascular access sites hourly  3.  Every 4-6 hours minimum:  Change oxygen saturation probe site  4.  Every 4-6 hours:  If on nasal continuous positive airway pressure, respiratory therapy assess nares and determine need for appliance change or resting period.  5/2/2024 2234 by Danica Reaves RN  Outcome: Progressing  5/2/2024 1707 by Regina Piña RN  Outcome: Progressing

## 2024-05-03 NOTE — CARE COORDINATION
Writer reviewed chart, patient is waiting final cultures, and ID to make recommendations once those are back. Patient will return LTC @ [facility] with no barriers. Patient is also having some issues with pain.     Ce Jung RN

## 2024-05-03 NOTE — PROGRESS NOTES
V2.0  [facility] Hospitalist Progress Note      Name:  Igor Ann /Age/Sex: 1970  (54 y.o. male)   MRN & CSN:  [id number] & [id number] Encounter Date/Time: 5/3/2024 11:23 AM EDT    Location:  0538/0538-01 PCP: Vonnie Cleveland APRN - NP       Hospital Day: 2    Assessment and Plan:   Igor Ann is a 54 y.o. male  who presents with Foot ulcer with necrosis of bone, left (HCC)      Plan:       Right foot wound  Patient recently admitted and seen by podiatry for the same issue.  Was on IV antibiotics at that time.  Completed course of oral.  Margins at that time were good.  Admitted today for further surgical debridement.  Podiatry consulted  Cardiology was consulted for surgical clearance, but patient went to the OR prior to being seen.  Infectious disease consulted  IV Abx: Vanco + Cefepime  Follow-up Blood cultures ordered and wound culture     Chronic respiratory failure  COPD  Not currently in exacerbation.  Continue supplemental oxygen  Continue breathing treatment     CAD  Patient currently on Eliquis.  Has been on DAPT before.  Will confirm current regiment  Appreciate cardiology recommendations  Continue Eliquis and statin and metoprolol  Determine if patient needs antiplatelet with cardiology assistance     Systolic CHF  With patient not currently in exacerbation  Continue metoprolol  Continue Entresto     ESRD on HD  Nephrology consulted  Ensure electrolytes are within normal limits prior to surgery     Type 2 diabetes  Lantus 50 units nightly  Sign scale insulin  Point-of-care glucose check  Hypoglycemia protocol     History of VTE  Continue Eliquis    Diet ADULT DIET; Regular; No Added Salt (3-4 gm); Low Potassium (Less than 3000 mg/day); 1000 ml   DVT Prophylaxis [] Lovenox, []  Heparin, [] SCDs, [] Ambulation,    [] Eliquis, [] Xarelto  [] Coumadin [] other   Code Status Full Code   Disposition From: SNF  Expected Disposition: SNF  Estimated Date of Discharge: 1 to 3 days  Patient

## 2024-05-04 LAB
ANION GAP SERPL CALCULATED.3IONS-SCNC: 12 MMOL/L (ref 3–16)
BASOPHILS # BLD: 0.1 K/UL (ref 0–0.2)
BASOPHILS NFR BLD: 0.9 %
BUN SERPL-MCNC: 63 MG/DL (ref 7–20)
CALCIUM SERPL-MCNC: 9.6 MG/DL (ref 8.3–10.6)
CHLORIDE SERPL-SCNC: 91 MMOL/L (ref 99–110)
CO2 SERPL-SCNC: 27 MMOL/L (ref 21–32)
CREAT SERPL-MCNC: 7.3 MG/DL (ref 0.9–1.3)
DEPRECATED RDW RBC AUTO: 17 % (ref 12.4–15.4)
EOSINOPHIL # BLD: 0.7 K/UL (ref 0–0.6)
EOSINOPHIL NFR BLD: 9.2 %
GFR SERPLBLD CREATININE-BSD FMLA CKD-EPI: 8 ML/MIN/{1.73_M2}
GLUCOSE BLD-MCNC: 133 MG/DL (ref 70–99)
GLUCOSE BLD-MCNC: 179 MG/DL (ref 70–99)
GLUCOSE BLD-MCNC: 197 MG/DL (ref 70–99)
GLUCOSE BLD-MCNC: 214 MG/DL (ref 70–99)
GLUCOSE SERPL-MCNC: 113 MG/DL (ref 70–99)
HCT VFR BLD AUTO: 26.9 % (ref 40.5–52.5)
HGB BLD-MCNC: 8.7 G/DL (ref 13.5–17.5)
LYMPHOCYTES # BLD: 0.8 K/UL (ref 1–5.1)
LYMPHOCYTES NFR BLD: 10.1 %
MCH RBC QN AUTO: 27.8 PG (ref 26–34)
MCHC RBC AUTO-ENTMCNC: 32.3 G/DL (ref 31–36)
MCV RBC AUTO: 86.1 FL (ref 80–100)
MONOCYTES # BLD: 0.8 K/UL (ref 0–1.3)
MONOCYTES NFR BLD: 9.8 %
NEUTROPHILS # BLD: 5.5 K/UL (ref 1.7–7.7)
NEUTROPHILS NFR BLD: 70 %
PERFORMED ON: ABNORMAL
PLATELET # BLD AUTO: 269 K/UL (ref 135–450)
PMV BLD AUTO: 7.3 FL (ref 5–10.5)
POTASSIUM SERPL-SCNC: 5.2 MMOL/L (ref 3.5–5.1)
RBC # BLD AUTO: 3.12 M/UL (ref 4.2–5.9)
SODIUM SERPL-SCNC: 130 MMOL/L (ref 136–145)
VANCOMYCIN SERPL-MCNC: 22.2 UG/ML
WBC # BLD AUTO: 7.9 K/UL (ref 4–11)

## 2024-05-04 PROCEDURE — 80202 ASSAY OF VANCOMYCIN: CPT

## 2024-05-04 PROCEDURE — 36415 COLL VENOUS BLD VENIPUNCTURE: CPT

## 2024-05-04 PROCEDURE — 90935 HEMODIALYSIS ONE EVALUATION: CPT

## 2024-05-04 PROCEDURE — 6360000002 HC RX W HCPCS: Performed by: STUDENT IN AN ORGANIZED HEALTH CARE EDUCATION/TRAINING PROGRAM

## 2024-05-04 PROCEDURE — 1200000000 HC SEMI PRIVATE

## 2024-05-04 PROCEDURE — 80048 BASIC METABOLIC PNL TOTAL CA: CPT

## 2024-05-04 PROCEDURE — 2580000003 HC RX 258: Performed by: STUDENT IN AN ORGANIZED HEALTH CARE EDUCATION/TRAINING PROGRAM

## 2024-05-04 PROCEDURE — 2700000000 HC OXYGEN THERAPY PER DAY

## 2024-05-04 PROCEDURE — 85025 COMPLETE CBC W/AUTO DIFF WBC: CPT

## 2024-05-04 PROCEDURE — 6360000002 HC RX W HCPCS

## 2024-05-04 PROCEDURE — 6370000000 HC RX 637 (ALT 250 FOR IP): Performed by: STUDENT IN AN ORGANIZED HEALTH CARE EDUCATION/TRAINING PROGRAM

## 2024-05-04 PROCEDURE — 94761 N-INVAS EAR/PLS OXIMETRY MLT: CPT

## 2024-05-04 PROCEDURE — 6370000000 HC RX 637 (ALT 250 FOR IP): Performed by: NURSE PRACTITIONER

## 2024-05-04 RX ORDER — OXYCODONE HYDROCHLORIDE 5 MG/1
10 TABLET ORAL EVERY 4 HOURS PRN
Status: DISCONTINUED | OUTPATIENT
Start: 2024-05-04 | End: 2024-05-10 | Stop reason: HOSPADM

## 2024-05-04 RX ORDER — HEPARIN SODIUM 1000 [USP'U]/ML
INJECTION, SOLUTION INTRAVENOUS; SUBCUTANEOUS
Status: COMPLETED
Start: 2024-05-04 | End: 2024-05-04

## 2024-05-04 RX ORDER — HEPARIN SODIUM 1000 [USP'U]/ML
3600 INJECTION, SOLUTION INTRAVENOUS; SUBCUTANEOUS PRN
Status: DISCONTINUED | OUTPATIENT
Start: 2024-05-04 | End: 2024-05-10 | Stop reason: HOSPADM

## 2024-05-04 RX ADMIN — SACUBITRIL AND VALSARTAN 1 TABLET: 24; 26 TABLET, FILM COATED ORAL at 13:28

## 2024-05-04 RX ADMIN — OXYCODONE HYDROCHLORIDE 10 MG: 5 TABLET ORAL at 10:27

## 2024-05-04 RX ADMIN — SODIUM CHLORIDE, PRESERVATIVE FREE 10 ML: 5 INJECTION INTRAVENOUS at 21:03

## 2024-05-04 RX ADMIN — HEPARIN SODIUM 3600 UNITS: 1000 INJECTION INTRAVENOUS; SUBCUTANEOUS at 10:29

## 2024-05-04 RX ADMIN — INSULIN GLARGINE 15 UNITS: 100 INJECTION, SOLUTION SUBCUTANEOUS at 20:50

## 2024-05-04 RX ADMIN — SODIUM CHLORIDE: 9 INJECTION, SOLUTION INTRAVENOUS at 21:02

## 2024-05-04 RX ADMIN — APIXABAN 5 MG: 5 TABLET, FILM COATED ORAL at 20:51

## 2024-05-04 RX ADMIN — TRAZODONE HYDROCHLORIDE 50 MG: 50 TABLET ORAL at 20:51

## 2024-05-04 RX ADMIN — SACUBITRIL AND VALSARTAN 1 TABLET: 24; 26 TABLET, FILM COATED ORAL at 20:51

## 2024-05-04 RX ADMIN — DULOXETINE HYDROCHLORIDE 60 MG: 60 CAPSULE, DELAYED RELEASE ORAL at 13:29

## 2024-05-04 RX ADMIN — BUSPIRONE HYDROCHLORIDE 10 MG: 5 TABLET ORAL at 20:51

## 2024-05-04 RX ADMIN — PRAVASTATIN SODIUM 40 MG: 40 TABLET ORAL at 20:50

## 2024-05-04 RX ADMIN — METOPROLOL SUCCINATE 25 MG: 25 TABLET, FILM COATED, EXTENDED RELEASE ORAL at 13:29

## 2024-05-04 RX ADMIN — OXYCODONE 10 MG: 5 TABLET ORAL at 17:11

## 2024-05-04 RX ADMIN — CEFEPIME 1000 MG: 1 INJECTION, POWDER, FOR SOLUTION INTRAMUSCULAR; INTRAVENOUS at 21:03

## 2024-05-04 RX ADMIN — BUSPIRONE HYDROCHLORIDE 10 MG: 5 TABLET ORAL at 13:29

## 2024-05-04 RX ADMIN — APIXABAN 5 MG: 5 TABLET, FILM COATED ORAL at 13:28

## 2024-05-04 RX ADMIN — OXYCODONE HYDROCHLORIDE 10 MG: 5 TABLET ORAL at 04:04

## 2024-05-04 RX ADMIN — QUETIAPINE FUMARATE 50 MG: 25 TABLET ORAL at 20:51

## 2024-05-04 RX ADMIN — OXYCODONE 10 MG: 5 TABLET ORAL at 21:33

## 2024-05-04 ASSESSMENT — PAIN SCALES - GENERAL
PAINLEVEL_OUTOF10: 4
PAINLEVEL_OUTOF10: 10
PAINLEVEL_OUTOF10: 10
PAINLEVEL_OUTOF10: 9
PAINLEVEL_OUTOF10: 10

## 2024-05-04 ASSESSMENT — PAIN DESCRIPTION - ORIENTATION
ORIENTATION: RIGHT;LEFT;MID;LOWER
ORIENTATION: LEFT

## 2024-05-04 ASSESSMENT — PAIN DESCRIPTION - DESCRIPTORS
DESCRIPTORS: DISCOMFORT;ACHING
DESCRIPTORS: ACHING

## 2024-05-04 ASSESSMENT — PAIN DESCRIPTION - LOCATION
LOCATION: FOOT
LOCATION: FOOT;LEG;BACK

## 2024-05-04 ASSESSMENT — PAIN - FUNCTIONAL ASSESSMENT: PAIN_FUNCTIONAL_ASSESSMENT: ACTIVITIES ARE NOT PREVENTED

## 2024-05-04 NOTE — PROGRESS NOTES
[facility]  Nephrology f/u Note           Reason for Consult:  ESRD   Requesting Physician:  Dr. Spencer    Sub/interval history  The patient was seen and examined; he feels well today with no CP, SOB, nausea or vomiting.    ROS: No fever or chills.  Social: No family at bedside.    Scheduled Meds:   apixaban  5 mg Oral BID    busPIRone  10 mg Oral TID    DULoxetine  60 mg Oral Daily    insulin glargine  15 Units SubCUTAneous Nightly    metoprolol succinate  25 mg Oral Daily    pravastatin  40 mg Oral Nightly    QUEtiapine  50 mg Oral Nightly    traZODone  50 mg Oral Daily    sodium chloride flush  5-40 mL IntraVENous 2 times per day    insulin lispro  0-4 Units SubCUTAneous TID WC    insulin lispro  0-4 Units SubCUTAneous Nightly    sacubitril-valsartan  1 tablet Oral BID    vancomycin (VANCOCIN) intermittent dosing (placeholder)   Other RX Placeholder    epoetin siddharth-epbx  5,000 Units IntraVENous Once per day on Tue Thu Sat    cefepime  1,000 mg IntraVENous Q24H     Continuous Infusions:   sodium chloride 25 mL (05/02/24 1601)    dextrose       PRN Meds:.heparin (porcine), ipratropium 0.5 mg-albuterol 2.5 mg, sodium chloride flush, sodium chloride, ondansetron **OR** ondansetron, polyethylene glycol, acetaminophen **OR** acetaminophen, glucose, dextrose bolus **OR** dextrose bolus, glucagon (rDNA), dextrose, HYDROmorphone, oxyCODONE    History of Present Illness on 5/2/24:    54 y.o. yo male with PMH of  ESRD, CAD, CHF, COPD, HTN, DM, CVA and HTN  who is admitted after right foot I n d and graft placement  He underwent HD at his unit in [place] where he is currently for 2h with 2.3l net UF, post wt was 97.7 kg     Physical exam:   Constitutional:  VITALS:  BP (!) 153/80   Pulse 77   Temp 97.4 °F (36.3 °C)   Resp 20   Ht 1.753 m (5' 9\")   Wt 101 kg (222 lb 10.6 oz)   SpO2 97%   BMI 32.88 kg/m²     Gen: alert, awake  Neck: No JVD  Skin: Unremarkable  Cardiovascular:  S1, S2 without m/r/g

## 2024-05-04 NOTE — PLAN OF CARE
Pt resting in bed quietly. Bed in lowest position, wheels locked, side rails up X2, non skid socks on. Pt refusing bed alarm. Pt instructed not to get out of bed on own, to use call light for staff assistance when ambulating or other needs. Pt verbalizes understanding. Call light within reach. Will continue to monitor.     Problem: ABCDS Injury Assessment  Goal: Absence of physical injury  Outcome: Progressing     Problem: Safety - Adult  Goal: Free from fall injury  Outcome: Progressing       Pt rates pain level using 0-10 pain scale. Pain meds administered as ordered per MAR. Pt instructed to use call light for increasing pain or ineffective pain management. Pt verbalizes understanding. Call light within reach. Will continue to monitor.     Problem: Pain  Goal: Verbalizes/displays adequate comfort level or baseline comfort level  Outcome: Progressing

## 2024-05-04 NOTE — FLOWSHEET NOTE
05/04/24 0915 05/04/24 1248   Vital Signs   BP (!) 153/80 (!) 135/46   Temp 97.4 °F (36.3 °C) 97.2 °F (36.2 °C)   Pulse 77 77   Respirations 20 20       Treatment time: 3.5 hours  Net UF: 2 L    Pre weight: 101 kg (Bed scale)  Post weight: 99 kg (Bed scale)  EDW: 95     Access used: [facility] HD Tunneled cath   Access function: Lines reversed due to high AP.    Medications or blood products given: None    Regular outpatient schedule: [facility]    Summary of response to treatment: Tolerated 3.5 hour HD tx with BP soft throughout tx, SBP 's.     Crit line: H1=27.3, H2=27.1, difference of .2, no refill present.  Ending profile A.    Copy of dialysis treatment record placed in chart, to be scanned into EMR.    Report called to Claudia Yusuf RN

## 2024-05-04 NOTE — PLAN OF CARE
Problem: Chronic Conditions and Co-morbidities  Goal: Patient's chronic conditions and co-morbidity symptoms are monitored and maintained or improved  5/3/2024 2246 by Dean Edwards LPN  Outcome: Progressing  Flowsheets (Taken 5/3/2024 2019)  Care Plan - Patient's Chronic Conditions and Co-Morbidity Symptoms are Monitored and Maintained or Improved: Monitor and assess patient's chronic conditions and comorbid symptoms for stability, deterioration, or improvement     Problem: Discharge Planning  Goal: Discharge to home or other facility with appropriate resources  5/3/2024 2246 by Dean Edwards LPN  Outcome: Progressing  Flowsheets (Taken 5/3/2024 2019)  Discharge to home or other facility with appropriate resources: Identify barriers to discharge with patient and caregiver     Problem: Pain  Goal: Verbalizes/displays adequate comfort level or baseline comfort level  5/3/2024 2246 by Dean Edwards LPN  Outcome: Progressing  Flowsheets (Taken 5/3/2024 2019)  Verbalizes/displays adequate comfort level or baseline comfort level: Encourage patient to monitor pain and request assistance     Problem: Safety - Adult  Goal: Free from fall injury  5/3/2024 2246 by Dean Edwards LPN  Outcome: Progressing     Problem: ABCDS Injury Assessment  Goal: Absence of physical injury  5/3/2024 2246 by Dean Edwards LPN  Outcome: Progressing

## 2024-05-04 NOTE — PROGRESS NOTES
Hospital Medicine Progress Note      Date of Admission: 5/2/2024  Hospital Day: 3    Chief Admission Complaint:  left foot ulcer    Subjective:    Seen while getting dialysis treatment today. States that his pain is not well controlled currently in his right foot. Ace bandage in place, I did not remove it.     Presenting Admission History:       Igor Ann is a 54 y.o. male with pmh of CAD, cardiomyopathy, CHF with systolic dysfunction, hypertension, ESRD on HD, COPD, chronic respiratory failure and known foot ulcer who presents as a direct admit for surgical intervention with podiatry.  Patient is a high risk surgical candidate and admitted rather than have the procedure as an outpatient.  Aside from the wound on his foot patient does had not have any acute complaints today.  Denies chest pain or shortness of breath.  Has had anesthesia in the past     Assessment/Plan:      Current Principal Problem:  Right foot ulcer, with necrosis of bone (HCC)    Right foot wound S/P I&D below fascia of right foot, metatarsal bone resection right foot, and integra graft application on 05/02/24 with Dr. Berg.  Patient recently admitted and seen by podiatry for the same issue.  Was on IV antibiotics at that time.  Completed course of oral.  Margins at that time were good.  Admitted today for further surgical debridement.  Podiatry consulted  Cardiology was consulted for surgical clearance, but patient went to the OR prior to being seen.  Infectious disease consulted, awaiting surgical pathology and cultures  IV Abx: Vanco + Cefepime  Follow-up Blood cultures ordered and wound culture     Chronic respiratory failure  COPD  Not currently in exacerbation.  Continue supplemental oxygen  Continue breathing treatment     CAD  Patient currently on Eliquis.  Has been on DAPT before.  Will confirm current regiment  Appreciate cardiology recommendations  Continue Eliquis and statin and metoprolol  Determine if patient needs

## 2024-05-04 NOTE — DISCHARGE INSTRUCTIONS
Discharge Instructions    -  Keep dressing clean, dry, and intact until your next office visit with Dr. Berg  -  Remain non weight bearing to your right foot as much as possible  -  Take all medications as prescribed  -  Place ice behind right knee for no more than twenty minutes at a time to help decrease pain and swelling  -  Elevate right lower extremity on top of pillows to help decrease pain and swelling  -  Follow up with Dr. Berg within the next week   -  Call Dr. Berg's office sooner at [phone] if you have any problems, questions or concerns

## 2024-05-05 LAB
ANION GAP SERPL CALCULATED.3IONS-SCNC: 12 MMOL/L (ref 3–16)
BASOPHILS # BLD: 0.1 K/UL (ref 0–0.2)
BASOPHILS NFR BLD: 1.3 %
BUN SERPL-MCNC: 52 MG/DL (ref 7–20)
CALCIUM SERPL-MCNC: 9.3 MG/DL (ref 8.3–10.6)
CHLORIDE SERPL-SCNC: 95 MMOL/L (ref 99–110)
CO2 SERPL-SCNC: 27 MMOL/L (ref 21–32)
CREAT SERPL-MCNC: 6.2 MG/DL (ref 0.9–1.3)
DEPRECATED RDW RBC AUTO: 17.1 % (ref 12.4–15.4)
EOSINOPHIL # BLD: 0.5 K/UL (ref 0–0.6)
EOSINOPHIL NFR BLD: 9.2 %
GFR SERPLBLD CREATININE-BSD FMLA CKD-EPI: 10 ML/MIN/{1.73_M2}
GLUCOSE BLD-MCNC: 104 MG/DL (ref 70–99)
GLUCOSE BLD-MCNC: 116 MG/DL (ref 70–99)
GLUCOSE BLD-MCNC: 130 MG/DL (ref 70–99)
GLUCOSE BLD-MCNC: 99 MG/DL (ref 70–99)
GLUCOSE SERPL-MCNC: 83 MG/DL (ref 70–99)
HCT VFR BLD AUTO: 25.9 % (ref 40.5–52.5)
HGB BLD-MCNC: 8 G/DL (ref 13.5–17.5)
LYMPHOCYTES # BLD: 0.8 K/UL (ref 1–5.1)
LYMPHOCYTES NFR BLD: 14.3 %
MCH RBC QN AUTO: 26.9 PG (ref 26–34)
MCHC RBC AUTO-ENTMCNC: 30.9 G/DL (ref 31–36)
MCV RBC AUTO: 87 FL (ref 80–100)
MONOCYTES # BLD: 0.7 K/UL (ref 0–1.3)
MONOCYTES NFR BLD: 11.6 %
NEUTROPHILS # BLD: 3.7 K/UL (ref 1.7–7.7)
NEUTROPHILS NFR BLD: 63.6 %
PERFORMED ON: ABNORMAL
PERFORMED ON: NORMAL
PLATELET # BLD AUTO: 251 K/UL (ref 135–450)
PMV BLD AUTO: 7.4 FL (ref 5–10.5)
POTASSIUM SERPL-SCNC: 4.7 MMOL/L (ref 3.5–5.1)
RBC # BLD AUTO: 2.97 M/UL (ref 4.2–5.9)
SODIUM SERPL-SCNC: 134 MMOL/L (ref 136–145)
VANCOMYCIN SERPL-MCNC: 17.8 UG/ML
WBC # BLD AUTO: 5.7 K/UL (ref 4–11)

## 2024-05-05 PROCEDURE — 2580000003 HC RX 258: Performed by: STUDENT IN AN ORGANIZED HEALTH CARE EDUCATION/TRAINING PROGRAM

## 2024-05-05 PROCEDURE — 97530 THERAPEUTIC ACTIVITIES: CPT

## 2024-05-05 PROCEDURE — 80202 ASSAY OF VANCOMYCIN: CPT

## 2024-05-05 PROCEDURE — 6370000000 HC RX 637 (ALT 250 FOR IP): Performed by: NURSE PRACTITIONER

## 2024-05-05 PROCEDURE — 2580000003 HC RX 258: Performed by: INTERNAL MEDICINE

## 2024-05-05 PROCEDURE — 94761 N-INVAS EAR/PLS OXIMETRY MLT: CPT

## 2024-05-05 PROCEDURE — 6360000002 HC RX W HCPCS: Performed by: STUDENT IN AN ORGANIZED HEALTH CARE EDUCATION/TRAINING PROGRAM

## 2024-05-05 PROCEDURE — 1200000000 HC SEMI PRIVATE

## 2024-05-05 PROCEDURE — 97162 PT EVAL MOD COMPLEX 30 MIN: CPT

## 2024-05-05 PROCEDURE — 6370000000 HC RX 637 (ALT 250 FOR IP): Performed by: STUDENT IN AN ORGANIZED HEALTH CARE EDUCATION/TRAINING PROGRAM

## 2024-05-05 PROCEDURE — 2700000000 HC OXYGEN THERAPY PER DAY

## 2024-05-05 PROCEDURE — 80048 BASIC METABOLIC PNL TOTAL CA: CPT

## 2024-05-05 PROCEDURE — 6360000002 HC RX W HCPCS: Performed by: INTERNAL MEDICINE

## 2024-05-05 PROCEDURE — 85025 COMPLETE CBC W/AUTO DIFF WBC: CPT

## 2024-05-05 RX ADMIN — BUSPIRONE HYDROCHLORIDE 10 MG: 5 TABLET ORAL at 15:31

## 2024-05-05 RX ADMIN — APIXABAN 5 MG: 5 TABLET, FILM COATED ORAL at 19:58

## 2024-05-05 RX ADMIN — OXYCODONE 10 MG: 5 TABLET ORAL at 10:43

## 2024-05-05 RX ADMIN — SODIUM CHLORIDE, PRESERVATIVE FREE 10 ML: 5 INJECTION INTRAVENOUS at 20:13

## 2024-05-05 RX ADMIN — VANCOMYCIN HYDROCHLORIDE 750 MG: 750 INJECTION, POWDER, LYOPHILIZED, FOR SOLUTION INTRAVENOUS at 12:50

## 2024-05-05 RX ADMIN — DULOXETINE HYDROCHLORIDE 60 MG: 60 CAPSULE, DELAYED RELEASE ORAL at 09:15

## 2024-05-05 RX ADMIN — BUSPIRONE HYDROCHLORIDE 10 MG: 5 TABLET ORAL at 09:15

## 2024-05-05 RX ADMIN — OXYCODONE 10 MG: 5 TABLET ORAL at 06:16

## 2024-05-05 RX ADMIN — CEFEPIME 1000 MG: 1 INJECTION, POWDER, FOR SOLUTION INTRAMUSCULAR; INTRAVENOUS at 20:09

## 2024-05-05 RX ADMIN — PRAVASTATIN SODIUM 40 MG: 40 TABLET ORAL at 19:58

## 2024-05-05 RX ADMIN — INSULIN GLARGINE 15 UNITS: 100 INJECTION, SOLUTION SUBCUTANEOUS at 20:15

## 2024-05-05 RX ADMIN — Medication 10 ML: at 12:47

## 2024-05-05 RX ADMIN — APIXABAN 5 MG: 5 TABLET, FILM COATED ORAL at 09:15

## 2024-05-05 RX ADMIN — TRAZODONE HYDROCHLORIDE 50 MG: 50 TABLET ORAL at 20:13

## 2024-05-05 RX ADMIN — BUSPIRONE HYDROCHLORIDE 10 MG: 5 TABLET ORAL at 19:58

## 2024-05-05 RX ADMIN — QUETIAPINE FUMARATE 50 MG: 25 TABLET ORAL at 19:58

## 2024-05-05 RX ADMIN — OXYCODONE 10 MG: 5 TABLET ORAL at 19:57

## 2024-05-05 RX ADMIN — SACUBITRIL AND VALSARTAN 1 TABLET: 24; 26 TABLET, FILM COATED ORAL at 19:58

## 2024-05-05 ASSESSMENT — PAIN DESCRIPTION - PAIN TYPE
TYPE: CHRONIC PAIN;SURGICAL PAIN
TYPE: CHRONIC PAIN;SURGICAL PAIN

## 2024-05-05 ASSESSMENT — PAIN SCALES - GENERAL
PAINLEVEL_OUTOF10: 9
PAINLEVEL_OUTOF10: 10
PAINLEVEL_OUTOF10: 8
PAINLEVEL_OUTOF10: 6
PAINLEVEL_OUTOF10: 10
PAINLEVEL_OUTOF10: 8

## 2024-05-05 ASSESSMENT — PAIN DESCRIPTION - ORIENTATION
ORIENTATION_2: LOWER
ORIENTATION: RIGHT
ORIENTATION: PROXIMAL;RIGHT
ORIENTATION: RIGHT

## 2024-05-05 ASSESSMENT — PAIN DESCRIPTION - DESCRIPTORS
DESCRIPTORS: BURNING;STABBING
DESCRIPTORS: ACHING
DESCRIPTORS: ACHING
DESCRIPTORS: BURNING;STABBING
DESCRIPTORS_2: ACHING
DESCRIPTORS: ACHING;DISCOMFORT

## 2024-05-05 ASSESSMENT — PAIN DESCRIPTION - LOCATION
LOCATION: FOOT
LOCATION_2: BACK
LOCATION: FOOT
LOCATION: ABDOMEN;FOOT

## 2024-05-05 ASSESSMENT — PAIN - FUNCTIONAL ASSESSMENT
PAIN_FUNCTIONAL_ASSESSMENT: PREVENTS OR INTERFERES SOME ACTIVE ACTIVITIES AND ADLS
PAIN_FUNCTIONAL_ASSESSMENT: PREVENTS OR INTERFERES WITH ALL ACTIVE AND SOME PASSIVE ACTIVITIES
PAIN_FUNCTIONAL_ASSESSMENT: PREVENTS OR INTERFERES WITH ALL ACTIVE AND SOME PASSIVE ACTIVITIES

## 2024-05-05 ASSESSMENT — PAIN DESCRIPTION - INTENSITY: RATING_2: 9

## 2024-05-05 ASSESSMENT — PAIN DESCRIPTION - ONSET
ONSET: ON-GOING
ONSET: ON-GOING

## 2024-05-05 NOTE — PLAN OF CARE
Problem: Chronic Conditions and Co-morbidities  Goal: Patient's chronic conditions and co-morbidity symptoms are monitored and maintained or improved  5/5/2024 0528 by Dipak Fry RN  Outcome: Progressing  Flowsheets (Taken 5/5/2024 0528)  Care Plan - Patient's Chronic Conditions and Co-Morbidity Symptoms are Monitored and Maintained or Improved:   Monitor and assess patient's chronic conditions and comorbid symptoms for stability, deterioration, or improvement   Collaborate with multidisciplinary team to address chronic and comorbid conditions and prevent exacerbation or deterioration   Update acute care plan with appropriate goals if chronic or comorbid symptoms are exacerbated and prevent overall improvement and discharge     Problem: Discharge Planning  Goal: Discharge to home or other facility with appropriate resources  5/5/2024 0528 by Dipak Fry RN  Outcome: Progressing  Flowsheets (Taken 5/5/2024 0528)  Discharge to home or other facility with appropriate resources:   Identify barriers to discharge with patient and caregiver   Arrange for needed discharge resources and transportation as appropriate   Identify discharge learning needs (meds, wound care, etc)   Refer to discharge planning if patient needs post-hospital services based on physician order or complex needs related to functional status, cognitive ability or social support system       Problem: Pain  Goal: Verbalizes/displays adequate comfort level or baseline comfort level  5/5/2024 0528 by Dipak Fry RN  Outcome: Progressing  Flowsheets (Taken 5/5/2024 0528)  Verbalizes/displays adequate comfort level or baseline comfort level:   Encourage patient to monitor pain and request assistance   Assess pain using appropriate pain scale   Administer analgesics based on type and severity of pain and evaluate response   Implement non-pharmacological measures as appropriate and evaluate response   Notify Licensed Independent Practitioner

## 2024-05-05 NOTE — PROGRESS NOTES
[email or web address]  Nephrology f/u Note           Reason for Consult:  ESRD   Requesting Physician:  Dr. Spencer    Sub/interval history  The patient was seen and examined; he feels well today with no CP, SOB, nausea or vomiting.    ROS: No fever or chills.  Social: No family at bedside.    Scheduled Meds:   vancomycin  750 mg IntraVENous Once    apixaban  5 mg Oral BID    busPIRone  10 mg Oral TID    DULoxetine  60 mg Oral Daily    insulin glargine  15 Units SubCUTAneous Nightly    metoprolol succinate  25 mg Oral Daily    pravastatin  40 mg Oral Nightly    QUEtiapine  50 mg Oral Nightly    traZODone  50 mg Oral Daily    sodium chloride flush  5-40 mL IntraVENous 2 times per day    insulin lispro  0-4 Units SubCUTAneous TID WC    insulin lispro  0-4 Units SubCUTAneous Nightly    sacubitril-valsartan  1 tablet Oral BID    vancomycin (VANCOCIN) intermittent dosing (placeholder)   Other RX Placeholder    epoetin siddharth-epbx  5,000 Units IntraVENous Once per day on Tue Thu Sat    cefepime  1,000 mg IntraVENous Q24H     Continuous Infusions:   sodium chloride Stopped (05/05/24 0918)    dextrose       PRN Meds:.heparin (porcine), oxyCODONE, ipratropium 0.5 mg-albuterol 2.5 mg, sodium chloride flush, sodium chloride, ondansetron **OR** ondansetron, polyethylene glycol, acetaminophen **OR** acetaminophen, glucose, dextrose bolus **OR** dextrose bolus, glucagon (rDNA), dextrose    History of Present Illness on 5/2/24:    54 y.o. yo male with PMH of  ESRD, CAD, CHF, COPD, HTN, DM, CVA and HTN  who is admitted after right foot I n d and graft placement  He underwent HD at his unit in [place] where he is currently for 2h with 2.3l net UF, post wt was 97.7 kg     Physical exam:   Constitutional:  VITALS:  BP (!) 91/56   Pulse 64   Temp 97.5 °F (36.4 °C) (Oral)   Resp 18   Ht 1.753 m (5' 9\")   Wt 99 kg (218 lb 4.1 oz)   SpO2 99%   BMI 32.23 kg/m²     Gen: alert, awake  Neck: No JVD  Skin: Unremarkable  Cardiovascular:

## 2024-05-05 NOTE — PROGRESS NOTES
Patient's EF (Ejection Fraction) is less than 40%    Patient has a past medical history of Ambulatory dysfunction, Aortic stenosis, Arthritis, Asthma, Bilateral hilar adenopathy syndrome, CAD (coronary artery disease), Cardiomyopathy ([facility]), CHF (congestive heart failure) ([facility]), Chronic pain, COPD (chronic obstructive pulmonary disease) ([facility]), CVA (cerebral vascular accident) ([facility]), Depression, Diabetes mellitus ([facility]), Difficult intravenous access, Emphysema of lung ([facility]), ESRD (end stage renal disease) on dialysis ([facility]), Fear of needles, Gastric ulcer, GERD (gastroesophageal reflux disease), Heart valve problem, Hemodialysis patient ([facility]), History of spinal fracture, Hx of blood clots, Hyperlipidemia, Hypertension, MI (myocardial infarction) ([facility]), Neuromuscular disorder ([facility]), Numbness and tingling in left arm, Pneumonia, PONV (postoperative nausea and vomiting), Prolonged emergence from general anesthesia, Sleep apnea, Stroke ([facility]), TIA (transient ischemic attack), and Unspecified diseases of blood and blood-forming organs. Comorbidities reviewed and education provided.    Patient and family's stated goal of care: increase activity tolerance prior to discharge    Patient's current functional capacity:  No limitation of physical activity. Ordinary physical activity does not cause undue fatigue, palpitation, dyspnea.    Pt resting in bed at this time on 4L O2. Pt denies shortness of breath. Pt without lower extremity edema. Patient's last three weights and intake/output reviewed:    Patient Vitals for the past 96 hrs (Last 3 readings):   Weight   05/04/24 1248 99 kg (218 lb 4.1 oz)   05/04/24 0915 101 kg (222 lb 10.6 oz)   05/02/24 0948 98.4 kg (217 lb)       Intake/Output Summary (Last 24 hours) at 5/5/2024 1906  Last data filed at 5/5/2024 0910  Gross per 24 hour   Intake 10 ml   Output --   Net 10 ml     Patient provided with education on CHF signs/symptoms, causes, discharge medications, daily weights, low  sodium diet, activity, and follow-up. Notified patient to call the doctor post discharge if patient experiences shortness of breath, chest pain, swelling, cough, or weight gain of three pounds in a day/five pounds in a week. Also notified patient to call the doctor with dizziness, increased fatigue, decreased or difficulty urinating.     Pt verbalized understanding. No additional questions at this time.

## 2024-05-05 NOTE — PROGRESS NOTES
Hospital Medicine Progress Note      Date of Admission: 5/2/2024  Hospital Day: 4    Chief Admission Complaint:  left foot ulcer    Subjective:    Pt still c/o significant pain in right foot, denies any other issues.    Presenting Admission History:       Igor Ann is a 54 y.o. male with pmh of CAD, cardiomyopathy, CHF with systolic dysfunction, hypertension, ESRD on HD, COPD, chronic respiratory failure and known foot ulcer who presents as a direct admit for surgical intervention with podiatry.  Patient is a high risk surgical candidate and admitted rather than have the procedure as an outpatient.  Aside from the wound on his foot patient does had not have any acute complaints today.  Denies chest pain or shortness of breath.  Has had anesthesia in the past     Assessment/Plan:      Current Principal Problem:  Right foot ulcer, with necrosis of bone (HCC)    Right foot wound S/P I&D below fascia of right foot, metatarsal bone resection right foot, and integra graft application on 05/02/24 with Dr. Berg.  Patient recently admitted and seen by podiatry for the same issue.  Was on IV antibiotics at that time.  Completed course of oral.  Margins at that time were good.  Admitted today for further surgical debridement.  Podiatry consulted  Cardiology was consulted for surgical clearance, but patient went to the OR prior to being seen.  Infectious disease consulted, awaiting surgical pathology and cultures  IV Abx: Vanco + Cefepime  Follow-up Blood cultures ordered and wound culture     Chronic respiratory failure  COPD  Not currently in exacerbation.  Continue supplemental oxygen  Continue breathing treatment     CAD  Patient currently on Eliquis.  Has been on DAPT before.  Will confirm current regiment  Appreciate cardiology recommendations  Continue Eliquis and statin and metoprolol  Determine if patient needs antiplatelet with cardiology assistance     Systolic CHF  With patient not currently in  accurate billing and does not necessarily reflect ongoing patient care which is documented elsewhere in the medical record)    [x] High (any 2)    A. Problems (any 1)  [x] Acute/Chronic Illness/injury posing threat to life or bodily function:  right foot wound   [] Severe exacerbation of chronic illness:    ---------------------------------------------------------------------  B. Risk of Treatment (any 1)   [x] Drugs/treatments that require intensive monitoring for toxicity include:    [x] IV ABX requiring serial renal monitoring for nephrotoxicity:   vanco  [] Post-Cath/Contrast study requiring serial renal monitoring for Contrast Induced Nephropathy  [] IV Narcotic analgesia for ADR   [] Aggressive IV diuresis requiring serial monitoring for renal impairment and electrolyte derangements  [] Hypertonic Saline requiring serial renal monitoring for appropriate electrolyte correction rate   [] Critical electrolyte abnormalities requiring IV replacement and close serial monitoring  [] Insulin - monitoring FSBS for Hypoglycemic ADR  [] Anticoagulation requiring close serial monitoring and dose adjustments at high risk of ADR   [] HD requiring close serial monitoring of electrolytes and fluid status  [] Other -  [] Change in code status:    [] Decision to escalate care:    [] Major surgery/procedure with associated risk factors:    ----------------------------------------------------------------------  C. Data (any 2)  [] Discussed management of the case with consultants as follows:    [x] Discussed the discharge plan in detail with case mgt including timing/barriers to discharge, need for support services and placement decision   [x] Imaging personally reviewed and interpreted, includes:   [x] Telemetry monitoring as noted above  [x] Data Review (any 3)  [x] Collateral history obtained from:  pt, review of emr  [x] All available Consultant notes from yesterday/today were reviewed  [x] All current labs were reviewed and

## 2024-05-05 NOTE — PLAN OF CARE
HEART FAILURE CARE PLAN:    Comorbidities Reviewed: Yes   Patient has a past medical history of Ambulatory dysfunction, Aortic stenosis, Arthritis, Asthma, Bilateral hilar adenopathy syndrome, CAD (coronary artery disease), Cardiomyopathy ([facility]), CHF (congestive heart failure) ([facility]), Chronic pain, COPD (chronic obstructive pulmonary disease) ([facility]), CVA (cerebral vascular accident) ([facility]), Depression, Diabetes mellitus ([facility]), Difficult intravenous access, Emphysema of lung ([facility]), ESRD (end stage renal disease) on dialysis ([facility]), Fear of needles, Gastric ulcer, GERD (gastroesophageal reflux disease), Heart valve problem, Hemodialysis patient ([facility]), History of spinal fracture, Hx of blood clots, Hyperlipidemia, Hypertension, MI (myocardial infarction) ([facility]), Neuromuscular disorder ([facility]), Numbness and tingling in left arm, Pneumonia, PONV (postoperative nausea and vomiting), Prolonged emergence from general anesthesia, Sleep apnea, Stroke ([facility]), TIA (transient ischemic attack), and Unspecified diseases of blood and blood-forming organs.     Weights Reviewed: Yes   Admission weight: 98.4 kg (217 lb)   Wt Readings from Last 3 Encounters:   05/04/24 99 kg (218 lb 4.1 oz)   04/11/24 98.6 kg (217 lb 6 oz)   01/24/24 85.3 kg (188 lb)     Intake & Output Reviewed: Yes     Intake/Output Summary (Last 24 hours) at 5/5/2024 0755  Last data filed at 5/4/2024 2106  Gross per 24 hour   Intake 10 ml   Output 50 ml   Net -40 ml       ECHOCARDIOGRAM Reviewed: Yes   Patient's Ejection Fraction (EF) is less than or equal to 40%. Discuss HFrEF Guideline Directed Medical Therapy (GDMT) with Cardiologist or Hospitalist:          Medications Reviewed: Yes   SCHEDULED HOSPITAL MEDICATIONS:   apixaban  5 mg Oral BID    busPIRone  10 mg Oral TID    DULoxetine  60 mg Oral Daily    insulin glargine  15 Units SubCUTAneous Nightly    metoprolol succinate  25 mg Oral Daily    pravastatin  40 mg Oral Nightly    QUEtiapine  50 mg Oral Nightly     traZODone  50 mg Oral Daily    sodium chloride flush  5-40 mL IntraVENous 2 times per day    insulin lispro  0-4 Units SubCUTAneous TID WC    insulin lispro  0-4 Units SubCUTAneous Nightly    sacubitril-valsartan  1 tablet Oral BID    vancomycin (VANCOCIN) intermittent dosing (placeholder)   Other RX Placeholder    epoetin siddharth-epbx  5,000 Units IntraVENous Once per day on Tue Thu Sat    cefepime  1,000 mg IntraVENous Q24H     HOME MEDICATIONS:  Prior to Admission medications    Medication Sig Start Date End Date Taking? Authorizing Provider   metoprolol succinate (TOPROL XL) 25 MG extended release tablet Take 1 tablet by mouth daily 1/12/24   Efren Lawrence MD   sacubitril-valsartan (ENTRESTO) 24-26 MG per tablet Take 1 tablet by mouth 2 times daily 1/12/24   Efren Lawrence MD   insulin glargine (LANTUS) 100 UNIT/ML injection vial Inject 15 Units into the skin nightly 1/6/24   Gómez Hwang MD   busPIRone (BUSPAR) 10 MG tablet Take 1 tablet by mouth 3 times daily 1/6/24   Gómez Hwang MD   apixaban (ELIQUIS) 5 MG TABS tablet Take 1 tablet by mouth 2 times daily    Gerson Velazquez MD   traZODone (DESYREL) 50 MG tablet Take 1 tablet by mouth daily 4/16/23   Gerson Velazquez MD   DULoxetine (CYMBALTA) 60 MG extended release capsule Take 1 capsule by mouth daily 3/17/23   Jackie Krause MD   pravastatin (PRAVACHOL) 40 MG tablet TAKE 1 TABLET BY MOUTH DAILY 2/7/23   Colleen Herrera, APRN - CNP   QUEtiapine (SEROQUEL) 50 MG tablet TAKE 1 TABLET BY MOUTH EVERY EVENING 6/30/22   Salvador Lugo MD   Calcium Acetate, Phos Binder, 667 MG CAPS TAKE 1 CAPSULE BY MOUTH THREE TIMES DAILY WITH MEALS  Patient taking differently: Take 2 capsules by mouth 3 times daily 8/12/21   Salvador Lugo MD   ipratropium-albuterol (DUONEB) 0.5-2.5 (3) MG/3ML SOLN nebulizer solution Inhale 3 mLs into the lungs every 6 hours as needed for Shortness of Breath 10/15/17

## 2024-05-05 NOTE — PLAN OF CARE
Problem: Chronic Conditions and Co-morbidities  Goal: Patient's chronic conditions and co-morbidity symptoms are monitored and maintained or improved  5/5/2024 1152 by Lauren Nuñez RN  Outcome: Progressing     Problem: Discharge Planning  Goal: Discharge to home or other facility with appropriate resources  5/5/2024 1152 by Lauren Nuñez RN  Outcome: Progressing     Problem: Pain  Goal: Verbalizes/displays adequate comfort level or baseline comfort level  5/5/2024 1152 by Lauren Nuñez RN  Outcome: Progressing  Pt scoring pain on 0-10 scale. Pain medications given per MAR. Pt instructed to call out when pain level increasing. Call light within reach. \     Problem: Safety - Adult  Goal: Free from fall injury  Outcome: Progressing  Bed in lowest position, wheels locked, 2/4 side rails up, nonskid footwear on. Bed/ chair check alarm in place, call light within reach. Pt instructed to call out when needing assistance. Pt stated understanding. \     Problem: ABCDS Injury Assessment  Goal: Absence of physical injury  Outcome: Progressing     Problem: Skin/Tissue Integrity  Goal: Absence of new skin breakdown  Description: 1.  Monitor for areas of redness and/or skin breakdown  2.  Assess vascular access sites hourly  3.  Every 4-6 hours minimum:  Change oxygen saturation probe site  4.  Every 4-6 hours:  If on nasal continuous positive airway pressure, respiratory therapy assess nares and determine need for appliance change or resting period.  Outcome: Progressing     Problem: Nutrition Deficit:  Goal: Optimize nutritional status  Outcome: Progressing

## 2024-05-05 NOTE — PROGRESS NOTES
Physical Therapy  Facility/Department: [place] [address] - MED SURG/ORTHO  Physical Therapy Initial Assessment    Name: Igor Ann  : 1970  MRN: [id number]  Date of Service: 2024    Discharge Recommendations:  Continue to assess pending progress   PT Equipment Recommendations  Equipment Needed: No  Other: pt states he has SW at home, do not anticipate nay additional needs at this time.      Therapy discharge recommendations take into account each patient's current medical complexities and are subject to input/oversight from the patient's healthcare team.   Barriers to Home Discharge:   [] Steps to access home entry or bed/bath:   [] Unable to transfer, ambulate, or propel wheelchair household distances without assist   [] Unable to perform ADLs without assist   [] Limited available assist at home upon discharge    [] Patient or family requests d/c to post-acute facility    [] Poor cognition/safety awareness for d/c to home alone    [] Unable to maintain ordered weight bearing status    [x] Patient with salient signs of long-standing immobility   [] Other: pt at high risk for falls, recent surgical procedure, NWB status of RLE, states he has care taking responsibilities for his wife who is disabled.    Patient Diagnosis(es): The encounter diagnosis was Gangrene of right foot (HCC).  Past Medical History:  has a past medical history of Ambulatory dysfunction, Aortic stenosis, Arthritis, Asthma, Bilateral hilar adenopathy syndrome, CAD (coronary artery disease), Cardiomyopathy (HCC), CHF (congestive heart failure) ([facility]), Chronic pain, COPD (chronic obstructive pulmonary disease) (HCC), CVA (cerebral vascular accident) (HCC), Depression, Diabetes mellitus (HCC), Difficult intravenous access, Emphysema of lung (HCC), ESRD (end stage renal disease) on dialysis ([facility]), Fear of needles, Gastric ulcer, GERD (gastroesophageal reflux disease), Heart valve problem, Hemodialysis patient ([facility]), History of spinal fracture,  and hips, but states foot is worse.  General  Chart Reviewed: Yes  Patient assessed for rehabilitation services?: Yes  Family / Caregiver Present: No  Referring Practitioner: Corrie Berg MD  Referral Date : 05/02/24  Diagnosis: R foot gangrene, s/p I&D and metatarsal bone resection RLE 5/2/24  Follows Commands: Within Functional Limits  Subjective  Subjective: pt found in bed, agreeable to PT initial evaluation, cleared for eval by RN.         Social/Functional History  Social/Functional History  Lives With: Spouse (wife is disabled, home 24 hour, pt states he is primary caregiver for his wife.)  Type of Home: Trailer (doublewide.)  Home Layout: One level  Home Access: Ramped entrance, Stairs to enter with rails (HR on ramp)  Entrance Stairs - Number of Steps: 4 JOSIE  Bathroom Shower/Tub: Walk-in shower, Shower chair with back  Bathroom Toilet: Standard  Bathroom Equipment: Grab bars in shower, Shower chair  Home Equipment: Walker, standard, Electric scooter, Oxygen (SW inside home, electric scooter outside of home. wears 4LPM at home.)  Has the patient had two or more falls in the past year or any fall with injury in the past year?: Yes (reports several falls, cannot estimate how many, states, \"I am on my ass more than I am on my feet.\"  estimates many many falls.)  ADL Assistance: Independent  Homemaking Assistance: Needs assistance (states he is supposed to have assist for homemaking tasks, but the ervice has been defunded and has had intermittent assistance.)  Homemaking Responsibilities: Yes (states he was supposed to get some assistance with homemaking tasks from an aid, but has had difficulty getting someone out.  states he gets prepared meals that only need to be heated up from an agency.)  Meal Prep Responsibility: Primary  Laundry Responsibility: Primary  Cleaning Responsibility: Primary  Bill Paying/Finance Responsibility: Primary  Shopping Responsibility: Secondary  Ambulation Assistance:

## 2024-05-05 NOTE — PROGRESS NOTES
Podiatric Surgery Daily Progress Note  Igor Ann  Subjective :   Patient seen and examined at the bedside. Patient denies any acute overnight events although reports continued pain to the surgical site. Patient denies N/V/F/C/SOB. Patient denies calf pain, thigh pain, chest pain.        Objective     BP (!) 145/52   Pulse 85   Temp 97.7 °F (36.5 °C) (Oral)   Resp 18   Ht 1.753 m (5' 9\")   Wt 99 kg (218 lb 4.1 oz)   SpO2 99%   BMI 32.23 kg/m²      I/O:  Intake/Output Summary (Last 24 hours) at 5/5/2024 0954  Last data filed at 5/4/2024 2103  Gross per 24 hour   Intake 10 ml   Output 50 ml   Net -40 ml              Wt Readings from Last 3 Encounters:   05/04/24 99 kg (218 lb 4.1 oz)   04/11/24 98.6 kg (217 lb 6 oz)   01/24/24 85.3 kg (188 lb)       LABS:    Recent Labs     05/04/24  0615 05/05/24  0559   WBC 7.9 5.7   HGB 8.7* 8.0*   HCT 26.9* 25.9*    251        Recent Labs     05/03/24  0935 05/04/24  0615 05/05/24  0559   *   < > 134*   K 4.8   < > 4.7   CL 91*   < > 95*   CO2 29   < > 27   PHOS 2.9  --   --    BUN 52*   < > 52*   CREATININE 6.5*   < > 6.2*    < > = values in this interval not displayed.      No results for input(s): \"PROT\", \"INR\", \"APTT\" in the last 72 hours.        LOWER EXTREMITY EXAMINATION    Integument:  Skin is warm, dry and supple, bilateral. Integra graft intact and in place to right guillotine TMA site. No drainage, no SOI.    Neurologic:  Protective sensation is grossly diminished to light touch, bilateral.  Vascular:  DP and PT pulses are nonpalpable, bilateral.  Musculoskeletal:  Patient has 5/5 strength on inversion/everison/dorsiflexion/plantarflexion, bilateral.  No gross musculoskeletal deformities noted. Previous R TMA       Imaging: None required currently      Impression/Recommendations:    The patient is a pleasant 54 y.o. male with:  #Gangrene, right foot  #Ulcer, right midfoot with necrosis of muscle  #DMII  #PVD  #s/p R foot I&D with MT bone

## 2024-05-06 ENCOUNTER — APPOINTMENT (OUTPATIENT)
Dept: GENERAL RADIOLOGY | Age: 54
End: 2024-05-06
Attending: INTERNAL MEDICINE
Payer: MEDICARE

## 2024-05-06 LAB
ANION GAP SERPL CALCULATED.3IONS-SCNC: 12 MMOL/L (ref 3–16)
BACTERIA BLD CULT ORG #2: NORMAL
BACTERIA BLD CULT: NORMAL
BASOPHILS # BLD: 0.1 K/UL (ref 0–0.2)
BASOPHILS NFR BLD: 1 %
BUN SERPL-MCNC: 65 MG/DL (ref 7–20)
CALCIUM SERPL-MCNC: 9.5 MG/DL (ref 8.3–10.6)
CHLORIDE SERPL-SCNC: 95 MMOL/L (ref 99–110)
CO2 SERPL-SCNC: 23 MMOL/L (ref 21–32)
CREAT SERPL-MCNC: 7.1 MG/DL (ref 0.9–1.3)
DEPRECATED RDW RBC AUTO: 16.9 % (ref 12.4–15.4)
EOSINOPHIL # BLD: 0.5 K/UL (ref 0–0.6)
EOSINOPHIL NFR BLD: 9.1 %
GFR SERPLBLD CREATININE-BSD FMLA CKD-EPI: 8 ML/MIN/{1.73_M2}
GLUCOSE BLD-MCNC: 173 MG/DL (ref 70–99)
GLUCOSE BLD-MCNC: 215 MG/DL (ref 70–99)
GLUCOSE BLD-MCNC: 294 MG/DL (ref 70–99)
GLUCOSE BLD-MCNC: 91 MG/DL (ref 70–99)
GLUCOSE SERPL-MCNC: 62 MG/DL (ref 70–99)
HCT VFR BLD AUTO: 26 % (ref 40.5–52.5)
HGB BLD-MCNC: 8.4 G/DL (ref 13.5–17.5)
LYMPHOCYTES # BLD: 0.8 K/UL (ref 1–5.1)
LYMPHOCYTES NFR BLD: 12.6 %
MCH RBC QN AUTO: 27.9 PG (ref 26–34)
MCHC RBC AUTO-ENTMCNC: 32.2 G/DL (ref 31–36)
MCV RBC AUTO: 86.7 FL (ref 80–100)
MONOCYTES # BLD: 0.6 K/UL (ref 0–1.3)
MONOCYTES NFR BLD: 10.3 %
NEUTROPHILS # BLD: 4 K/UL (ref 1.7–7.7)
NEUTROPHILS NFR BLD: 67 %
PERFORMED ON: ABNORMAL
PERFORMED ON: NORMAL
PLATELET # BLD AUTO: 247 K/UL (ref 135–450)
PMV BLD AUTO: 7.4 FL (ref 5–10.5)
POTASSIUM SERPL-SCNC: 5.2 MMOL/L (ref 3.5–5.1)
POTASSIUM SERPL-SCNC: 5.2 MMOL/L (ref 3.5–5.1)
RBC # BLD AUTO: 3.01 M/UL (ref 4.2–5.9)
SODIUM SERPL-SCNC: 130 MMOL/L (ref 136–145)
WBC # BLD AUTO: 6 K/UL (ref 4–11)

## 2024-05-06 PROCEDURE — 94640 AIRWAY INHALATION TREATMENT: CPT

## 2024-05-06 PROCEDURE — 6370000000 HC RX 637 (ALT 250 FOR IP): Performed by: INTERNAL MEDICINE

## 2024-05-06 PROCEDURE — 2700000000 HC OXYGEN THERAPY PER DAY

## 2024-05-06 PROCEDURE — 6370000000 HC RX 637 (ALT 250 FOR IP): Performed by: STUDENT IN AN ORGANIZED HEALTH CARE EDUCATION/TRAINING PROGRAM

## 2024-05-06 PROCEDURE — 1200000000 HC SEMI PRIVATE

## 2024-05-06 PROCEDURE — 94761 N-INVAS EAR/PLS OXIMETRY MLT: CPT

## 2024-05-06 PROCEDURE — 2580000003 HC RX 258: Performed by: STUDENT IN AN ORGANIZED HEALTH CARE EDUCATION/TRAINING PROGRAM

## 2024-05-06 PROCEDURE — 6360000002 HC RX W HCPCS: Performed by: NURSE PRACTITIONER

## 2024-05-06 PROCEDURE — 80048 BASIC METABOLIC PNL TOTAL CA: CPT

## 2024-05-06 PROCEDURE — 2500000003 HC RX 250 WO HCPCS: Performed by: NURSE PRACTITIONER

## 2024-05-06 PROCEDURE — 6370000000 HC RX 637 (ALT 250 FOR IP): Performed by: NURSE PRACTITIONER

## 2024-05-06 PROCEDURE — 6360000002 HC RX W HCPCS: Performed by: STUDENT IN AN ORGANIZED HEALTH CARE EDUCATION/TRAINING PROGRAM

## 2024-05-06 PROCEDURE — 84132 ASSAY OF SERUM POTASSIUM: CPT

## 2024-05-06 PROCEDURE — 36415 COLL VENOUS BLD VENIPUNCTURE: CPT

## 2024-05-06 PROCEDURE — 99232 SBSQ HOSP IP/OBS MODERATE 35: CPT | Performed by: INTERNAL MEDICINE

## 2024-05-06 PROCEDURE — 71045 X-RAY EXAM CHEST 1 VIEW: CPT

## 2024-05-06 PROCEDURE — 85025 COMPLETE CBC W/AUTO DIFF WBC: CPT

## 2024-05-06 RX ORDER — METOPROLOL TARTRATE 1 MG/ML
5 INJECTION, SOLUTION INTRAVENOUS ONCE
Status: COMPLETED | OUTPATIENT
Start: 2024-05-06 | End: 2024-05-06

## 2024-05-06 RX ORDER — HYDROMORPHONE HYDROCHLORIDE 1 MG/ML
0.25 INJECTION, SOLUTION INTRAMUSCULAR; INTRAVENOUS; SUBCUTANEOUS ONCE
Status: COMPLETED | OUTPATIENT
Start: 2024-05-06 | End: 2024-05-06

## 2024-05-06 RX ORDER — HYDROMORPHONE HYDROCHLORIDE 1 MG/ML
0.25 INJECTION, SOLUTION INTRAMUSCULAR; INTRAVENOUS; SUBCUTANEOUS EVERY 4 HOURS PRN
Status: DISCONTINUED | OUTPATIENT
Start: 2024-05-06 | End: 2024-05-07

## 2024-05-06 RX ADMIN — INSULIN GLARGINE 15 UNITS: 100 INJECTION, SOLUTION SUBCUTANEOUS at 20:59

## 2024-05-06 RX ADMIN — Medication 10 ML: at 14:31

## 2024-05-06 RX ADMIN — Medication 10 ML: at 16:42

## 2024-05-06 RX ADMIN — METOPROLOL TARTRATE 5 MG: 1 INJECTION, SOLUTION INTRAVENOUS at 20:31

## 2024-05-06 RX ADMIN — SACUBITRIL AND VALSARTAN 1 TABLET: 24; 26 TABLET, FILM COATED ORAL at 20:58

## 2024-05-06 RX ADMIN — BUSPIRONE HYDROCHLORIDE 10 MG: 5 TABLET ORAL at 20:58

## 2024-05-06 RX ADMIN — SACUBITRIL AND VALSARTAN 1 TABLET: 24; 26 TABLET, FILM COATED ORAL at 08:37

## 2024-05-06 RX ADMIN — TRAZODONE HYDROCHLORIDE 50 MG: 50 TABLET ORAL at 20:59

## 2024-05-06 RX ADMIN — IPRATROPIUM BROMIDE AND ALBUTEROL SULFATE 1 DOSE: .5; 2.5 SOLUTION RESPIRATORY (INHALATION) at 08:45

## 2024-05-06 RX ADMIN — IPRATROPIUM BROMIDE AND ALBUTEROL SULFATE 1 DOSE: .5; 2.5 SOLUTION RESPIRATORY (INHALATION) at 20:20

## 2024-05-06 RX ADMIN — APIXABAN 5 MG: 5 TABLET, FILM COATED ORAL at 08:37

## 2024-05-06 RX ADMIN — HYDROMORPHONE HYDROCHLORIDE 0.25 MG: 1 INJECTION, SOLUTION INTRAMUSCULAR; INTRAVENOUS; SUBCUTANEOUS at 14:30

## 2024-05-06 RX ADMIN — OXYCODONE 10 MG: 5 TABLET ORAL at 18:01

## 2024-05-06 RX ADMIN — DULOXETINE HYDROCHLORIDE 60 MG: 60 CAPSULE, DELAYED RELEASE ORAL at 08:37

## 2024-05-06 RX ADMIN — BUSPIRONE HYDROCHLORIDE 10 MG: 5 TABLET ORAL at 14:27

## 2024-05-06 RX ADMIN — HYDROMORPHONE HYDROCHLORIDE 0.25 MG: 1 INJECTION, SOLUTION INTRAMUSCULAR; INTRAVENOUS; SUBCUTANEOUS at 16:41

## 2024-05-06 RX ADMIN — PRAVASTATIN SODIUM 40 MG: 40 TABLET ORAL at 20:58

## 2024-05-06 RX ADMIN — SODIUM ZIRCONIUM CYCLOSILICATE 10 G: 10 POWDER, FOR SUSPENSION ORAL at 10:56

## 2024-05-06 RX ADMIN — QUETIAPINE FUMARATE 50 MG: 25 TABLET ORAL at 20:58

## 2024-05-06 RX ADMIN — SODIUM CHLORIDE, PRESERVATIVE FREE 10 ML: 5 INJECTION INTRAVENOUS at 08:38

## 2024-05-06 RX ADMIN — BUSPIRONE HYDROCHLORIDE 10 MG: 5 TABLET ORAL at 08:37

## 2024-05-06 RX ADMIN — APIXABAN 5 MG: 5 TABLET, FILM COATED ORAL at 20:58

## 2024-05-06 RX ADMIN — CEFEPIME 1000 MG: 1 INJECTION, POWDER, FOR SOLUTION INTRAMUSCULAR; INTRAVENOUS at 20:57

## 2024-05-06 RX ADMIN — OXYCODONE 10 MG: 5 TABLET ORAL at 12:54

## 2024-05-06 RX ADMIN — METOPROLOL SUCCINATE 25 MG: 25 TABLET, FILM COATED, EXTENDED RELEASE ORAL at 08:37

## 2024-05-06 RX ADMIN — OXYCODONE 10 MG: 5 TABLET ORAL at 08:37

## 2024-05-06 ASSESSMENT — PAIN SCALES - GENERAL
PAINLEVEL_OUTOF10: 10
PAINLEVEL_OUTOF10: 8
PAINLEVEL_OUTOF10: 7
PAINLEVEL_OUTOF10: 8
PAINLEVEL_OUTOF10: 10
PAINLEVEL_OUTOF10: 8

## 2024-05-06 ASSESSMENT — PAIN DESCRIPTION - INTENSITY: RATING_2: 10

## 2024-05-06 ASSESSMENT — PAIN DESCRIPTION - ONSET
ONSET: ON-GOING

## 2024-05-06 ASSESSMENT — PAIN DESCRIPTION - DESCRIPTORS
DESCRIPTORS: SHARP;ACHING;POUNDING
DESCRIPTORS: ACHING;SHARP
DESCRIPTORS_2: ACHING
DESCRIPTORS: ACHING;SHARP
DESCRIPTORS: ACHING;SHARP

## 2024-05-06 ASSESSMENT — PAIN DESCRIPTION - LOCATION
LOCATION: FOOT
LOCATION_2: BACK
LOCATION: FOOT

## 2024-05-06 ASSESSMENT — PAIN DESCRIPTION - ORIENTATION
ORIENTATION: RIGHT
ORIENTATION_2: LOWER
ORIENTATION: RIGHT

## 2024-05-06 ASSESSMENT — PAIN DESCRIPTION - FREQUENCY
FREQUENCY: CONTINUOUS

## 2024-05-06 ASSESSMENT — PAIN DESCRIPTION - PAIN TYPE
TYPE: CHRONIC PAIN;SURGICAL PAIN
TYPE: ACUTE PAIN

## 2024-05-06 ASSESSMENT — PAIN - FUNCTIONAL ASSESSMENT
PAIN_FUNCTIONAL_ASSESSMENT: PREVENTS OR INTERFERES WITH ALL ACTIVE AND SOME PASSIVE ACTIVITIES

## 2024-05-06 NOTE — PROGRESS NOTES
STAT Potassium level ordered by CAPRICE Silverman NP. Writer messaged Dr. Lo at the labs request to see if lab could be drawn with dialysis tomorrow. Dr. Lo stated he was okay with it as long as Nephrology was okay with it.     Writer reached out to Dr. Golden, Nephrology.  He stated it was okay for Potassium level to be drawn tomorrow during dialysis. Dr. Lo notified. Lab notified.
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
## 2024-05-06 NOTE — PROGRESS NOTES
Hospital Medicine Progress Note      Date of Admission: 5/2/2024  Hospital Day: 5    Chief Admission Complaint:  left foot ulcer    Subjective:    Pt still with c/o significant pain, added back on IV dilaudid today.     Presenting Admission History:       Igor Ann is a 54 y.o. male with pmh of CAD, cardiomyopathy, CHF with systolic dysfunction, hypertension, ESRD on HD, COPD, chronic respiratory failure and known foot ulcer who presents as a direct admit for surgical intervention with podiatry.  Patient is a high risk surgical candidate and admitted rather than have the procedure as an outpatient.  Aside from the wound on his foot patient does had not have any acute complaints today.  Denies chest pain or shortness of breath.  Has had anesthesia in the past     Assessment/Plan:      Current Principal Problem:  Right foot ulcer, with necrosis of bone (HCC)    Right foot wound S/P I&D below fascia of right foot, metatarsal bone resection right foot, and integra graft application on 05/02/24 with Dr. Berg.  Patient recently admitted and seen by podiatry for the same issue.  Was on IV antibiotics at that time.  Completed course of oral.  Margins at that time were good.  Admitted today for further surgical debridement.  Podiatry consulted  Cardiology was consulted for surgical clearance, but patient went to the OR prior to being seen.  Infectious disease consulted, awaiting surgical pathology and cultures  IV Abx: Vanco + Cefepime  Follow-up Blood cultures ordered and wound culture     Chronic respiratory failure  COPD  Not currently in exacerbation.  Continue supplemental oxygen  Continue breathing treatment     CAD  Patient currently on Eliquis.  Has been on DAPT before.  Will confirm current regiment  Appreciate cardiology recommendations  Continue Eliquis and statin and metoprolol  Determine if patient needs antiplatelet with cardiology assistance     Systolic CHF  With patient not currently in

## 2024-05-06 NOTE — PROGRESS NOTES
Physical Therapy  Attempted to see pt for follow up PT visit however pt declining to participate d/t pain. Educated pt on the importance of mobility and participation however pt continues to refuse at this time.  Will follow up with pt for follow up visit as schedule allows.  Thank you,  Natan Hurst, PTA

## 2024-05-06 NOTE — PLAN OF CARE
Problem: Chronic Conditions and Co-morbidities  Goal: Patient's chronic conditions and co-morbidity symptoms are monitored and maintained or improved  5/6/2024 1302 by Lauren Nuñez RN  Outcome: Progressing     Problem: Discharge Planning  Goal: Discharge to home or other facility with appropriate resources  5/6/2024 1302 by Lauren Nuñez RN  Outcome: Progressing     Problem: Pain  Goal: Verbalizes/displays adequate comfort level or baseline comfort level  5/6/2024 1302 by Lauren Nuñez RN  Outcome: Progressing  Pt scoring pain on 0-10 scale. Pain medications given per MAR. Pt instructed to call out when pain level increasing. Call light within reach.      Problem: Safety - Adult  Goal: Free from fall injury  5/6/2024 1302 by Lauren Nuñez RN  Outcome: Progressing  Bed in lowest position, wheels locked, 2/4 side rails up, nonskid footwear on. Bed/ chair check alarm in place, call light within reach. Pt instructed to call out when needing assistance. Pt stated understanding.      Problem: ABCDS Injury Assessment  Goal: Absence of physical injury  5/6/2024 1302 by Lauern Nuñez RN  Outcome: Progressing     Problem: Skin/Tissue Integrity  Goal: Absence of new skin breakdown  Description: 1.  Monitor for areas of redness and/or skin breakdown  2.  Assess vascular access sites hourly  3.  Every 4-6 hours minimum:  Change oxygen saturation probe site  4.  Every 4-6 hours:  If on nasal continuous positive airway pressure, respiratory therapy assess nares and determine need for appliance change or resting period.  5/6/2024 1302 by Lauren Nuñez RN  Outcome: Progressing     Problem: Nutrition Deficit:  Goal: Optimize nutritional status  5/6/2024 1302 by Lauren Nuñez RN  Outcome: Progressing

## 2024-05-06 NOTE — PROGRESS NOTES
[facility]  Nephrology f/u Note           Reason for Consult:  ESRD   Requesting Physician:  Dr. Spencer    Sub/interval history  The patient was seen and examined; he feels well today with no CP,  nausea or vomiting.  Has baseline shortness of breath.  On nasal cannula.    ROS: No fever or chills.  Social: No family at bedside.    Scheduled Meds:   apixaban  5 mg Oral BID    busPIRone  10 mg Oral TID    DULoxetine  60 mg Oral Daily    insulin glargine  15 Units SubCUTAneous Nightly    metoprolol succinate  25 mg Oral Daily    pravastatin  40 mg Oral Nightly    QUEtiapine  50 mg Oral Nightly    traZODone  50 mg Oral Daily    sodium chloride flush  5-40 mL IntraVENous 2 times per day    insulin lispro  0-4 Units SubCUTAneous TID WC    insulin lispro  0-4 Units SubCUTAneous Nightly    sacubitril-valsartan  1 tablet Oral BID    vancomycin (VANCOCIN) intermittent dosing (placeholder)   Other RX Placeholder    epoetin siddharth-epbx  5,000 Units IntraVENous Once per day on Tue Thu Sat    cefepime  1,000 mg IntraVENous Q24H     Continuous Infusions:   sodium chloride Stopped (05/05/24 1533)    dextrose       PRN Meds:.heparin (porcine), oxyCODONE, ipratropium 0.5 mg-albuterol 2.5 mg, sodium chloride flush, sodium chloride, ondansetron **OR** ondansetron, polyethylene glycol, acetaminophen **OR** acetaminophen, glucose, dextrose bolus **OR** dextrose bolus, glucagon (rDNA), dextrose    History of Present Illness on 5/2/24:    54 y.o. yo male with PMH of  ESRD, CAD, CHF, COPD, HTN, DM, CVA and HTN  who is admitted after right foot I n d and graft placement  He underwent HD at his unit in [place] where he is currently for 2h with 2.3l net UF, post wt was 97.7 kg     Physical exam:   Constitutional:  VITALS:  BP (!) 191/96   Pulse 92   Temp 97.4 °F (36.3 °C)   Resp 20   Ht 1.753 m (5' 9\")   Wt 99 kg (218 lb 4.1 oz)   SpO2 94%   BMI 32.23 kg/m²     Gen: alert, awake  Neck: No JVD  Skin:  Unremarkable  Cardiovascular:  S1, S2 without m/r/g   Respiratory: CTA B without w/r/r; respiratory effort normal  Abdomen:  soft, nt, nd,   Extremities: + lower extremity edema; right leg wrapped w ace wrap  Neuro/Psy: AAoriented times 3 ; moves all 4 ext  Access: left IJ TDC    Data/  Recent Labs     05/04/24  0615 05/05/24  0559 05/06/24  0638   WBC 7.9 5.7 6.0   HGB 8.7* 8.0* 8.4*   HCT 26.9* 25.9* 26.0*   MCV 86.1 87.0 86.7    251 247       Recent Labs     05/04/24  0615 05/05/24  0559 05/06/24  0534   * 134* 130*   K 5.2* 4.7 5.2*   CL 91* 95* 95*   CO2 27 27 23   GLUCOSE 113* 83 62*   BUN 63* 52* 65*   CREATININE 7.3* 6.2* 7.1*   LABGLOM 8* 10* 8*         Assessment  -ESRD on HD TTS at [facility]   OP TW 95 kg   Weight up to 99 kg on 5/4/2024.    -Gangrene Right foot s/p I n D, metatarsal bone resection w integra graft on 5/2/24    -Anemia    -Hyponatremia    -HTN    -CKD/MBD    Plan  -HD per TTS schedule   -Offered HD today but patient refused.  -Will consider extra UF session on Wednesday though  -Lokelma today.  Recheck potassium in the afternoon.  Might need another dose of Lokelma in the evening    -renal diet w FR 1l/day    -retacrit 5K with HD     -renal dose meds    -Antibiotics with dialysis till 6/13/2024.  Vancomycin 750 mg, cefepime 2 g after HD      Belinda Golden MD  Office: [phone]  Fax:    [phone]  [facility]

## 2024-05-06 NOTE — PROGRESS NOTES
Right foot pain continues to be 10/10. One time dose given per order. PRNs given per order. Hospitalist made aware. Follow up with hospitalist tomorrow per Dr. Lo.

## 2024-05-06 NOTE — PLAN OF CARE
Problem: Chronic Conditions and Co-morbidities  Goal: Patient's chronic conditions and co-morbidity symptoms are monitored and maintained or improved  5/6/2024 0008 by Kari Sharp RN  Outcome: Progressing  Flowsheets (Taken 5/5/2024 2009)  Care Plan - Patient's Chronic Conditions and Co-Morbidity Symptoms are Monitored and Maintained or Improved:   Monitor and assess patient's chronic conditions and comorbid symptoms for stability, deterioration, or improvement   Collaborate with multidisciplinary team to address chronic and comorbid conditions and prevent exacerbation or deterioration   Update acute care plan with appropriate goals if chronic or comorbid symptoms are exacerbated and prevent overall improvement and discharge     Problem: Discharge Planning  Goal: Discharge to home or other facility with appropriate resources  5/6/2024 0008 by Kari Sharp RN  Outcome: Progressing  Flowsheets (Taken 5/5/2024 2009)  Discharge to home or other facility with appropriate resources:   Identify barriers to discharge with patient and caregiver   Arrange for needed discharge resources and transportation as appropriate   Identify discharge learning needs (meds, wound care, etc)   Refer to discharge planning if patient needs post-hospital services based on physician order or complex needs related to functional status, cognitive ability or social support system     Problem: Pain  Goal: Verbalizes/displays adequate comfort level or baseline comfort level  5/6/2024 0008 by Kari Sharp, RN  Outcome: Progressing  Flowsheets (Taken 5/5/2024 2117)  Verbalizes/displays adequate comfort level or baseline comfort level:   Encourage patient to monitor pain and request assistance   Assess pain using appropriate pain scale   Administer analgesics based on type and severity of pain and evaluate response   Implement non-pharmacological measures as appropriate and evaluate response   Consider cultural and social influences on pain and pain

## 2024-05-06 NOTE — CARE COORDINATION
Writer reviewed chart, and spoke with NP, Patient refused dialysis today due to pain issues. Still waiting pathology results. Patient will co back to [facility] with IV ABX.     Ce Jung RN

## 2024-05-06 NOTE — FLOWSHEET NOTE
Pt c/o SOB, found at 86% on 3L, increased to 6L to maintain above 90%. Still feels SOB. /111, . RR 30. RT en route for breathing tx. Cate Light NP notified of above. Awaiting response      2008, new order for chest xray. Pt now at 5L at 95%, still feeling SOB, RR 28

## 2024-05-07 ENCOUNTER — APPOINTMENT (OUTPATIENT)
Dept: GENERAL RADIOLOGY | Age: 54
End: 2024-05-07
Attending: INTERNAL MEDICINE
Payer: MEDICARE

## 2024-05-07 LAB
ANION GAP SERPL CALCULATED.3IONS-SCNC: 17 MMOL/L (ref 3–16)
BACTERIA SPEC AEROBE CULT: ABNORMAL
BACTERIA SPEC AEROBE CULT: ABNORMAL
BACTERIA SPEC ANAEROBE CULT: ABNORMAL
BASOPHILS # BLD: 0.1 K/UL (ref 0–0.2)
BASOPHILS NFR BLD: 0.6 %
BUN SERPL-MCNC: 81 MG/DL (ref 7–20)
CALCIUM SERPL-MCNC: 9.5 MG/DL (ref 8.3–10.6)
CHLORIDE SERPL-SCNC: 90 MMOL/L (ref 99–110)
CO2 SERPL-SCNC: 22 MMOL/L (ref 21–32)
CREAT SERPL-MCNC: 8.4 MG/DL (ref 0.9–1.3)
DEPRECATED RDW RBC AUTO: 17.5 % (ref 12.4–15.4)
EOSINOPHIL # BLD: 0.4 K/UL (ref 0–0.6)
EOSINOPHIL NFR BLD: 4.1 %
GFR SERPLBLD CREATININE-BSD FMLA CKD-EPI: 7 ML/MIN/{1.73_M2}
GLUCOSE BLD-MCNC: 166 MG/DL (ref 70–99)
GLUCOSE BLD-MCNC: 176 MG/DL (ref 70–99)
GLUCOSE BLD-MCNC: 282 MG/DL (ref 70–99)
GLUCOSE BLD-MCNC: 304 MG/DL (ref 70–99)
GLUCOSE SERPL-MCNC: 141 MG/DL (ref 70–99)
GRAM STN SPEC: ABNORMAL
HCT VFR BLD AUTO: 25.7 % (ref 40.5–52.5)
HGB BLD-MCNC: 7.8 G/DL (ref 13.5–17.5)
LYMPHOCYTES # BLD: 0.5 K/UL (ref 1–5.1)
LYMPHOCYTES NFR BLD: 5.9 %
MCH RBC QN AUTO: 26.5 PG (ref 26–34)
MCHC RBC AUTO-ENTMCNC: 30.4 G/DL (ref 31–36)
MCV RBC AUTO: 87.2 FL (ref 80–100)
MONOCYTES # BLD: 0.6 K/UL (ref 0–1.3)
MONOCYTES NFR BLD: 6.6 %
NEUTROPHILS # BLD: 7.3 K/UL (ref 1.7–7.7)
NEUTROPHILS NFR BLD: 82.8 %
ORGANISM: ABNORMAL
PERFORMED ON: ABNORMAL
PLATELET # BLD AUTO: 261 K/UL (ref 135–450)
PMV BLD AUTO: 7.6 FL (ref 5–10.5)
POTASSIUM SERPL-SCNC: 5.2 MMOL/L (ref 3.5–5.1)
RBC # BLD AUTO: 2.95 M/UL (ref 4.2–5.9)
SODIUM SERPL-SCNC: 129 MMOL/L (ref 136–145)
VANCOMYCIN SERPL-MCNC: 21.1 UG/ML
WBC # BLD AUTO: 8.9 K/UL (ref 4–11)

## 2024-05-07 PROCEDURE — 36415 COLL VENOUS BLD VENIPUNCTURE: CPT

## 2024-05-07 PROCEDURE — 80048 BASIC METABOLIC PNL TOTAL CA: CPT

## 2024-05-07 PROCEDURE — 2580000003 HC RX 258: Performed by: STUDENT IN AN ORGANIZED HEALTH CARE EDUCATION/TRAINING PROGRAM

## 2024-05-07 PROCEDURE — 94761 N-INVAS EAR/PLS OXIMETRY MLT: CPT

## 2024-05-07 PROCEDURE — 6370000000 HC RX 637 (ALT 250 FOR IP): Performed by: INTERNAL MEDICINE

## 2024-05-07 PROCEDURE — 6360000002 HC RX W HCPCS: Performed by: NURSE PRACTITIONER

## 2024-05-07 PROCEDURE — 6370000000 HC RX 637 (ALT 250 FOR IP): Performed by: STUDENT IN AN ORGANIZED HEALTH CARE EDUCATION/TRAINING PROGRAM

## 2024-05-07 PROCEDURE — 2580000003 HC RX 258: Performed by: NURSE PRACTITIONER

## 2024-05-07 PROCEDURE — 6360000002 HC RX W HCPCS: Performed by: STUDENT IN AN ORGANIZED HEALTH CARE EDUCATION/TRAINING PROGRAM

## 2024-05-07 PROCEDURE — 6360000002 HC RX W HCPCS

## 2024-05-07 PROCEDURE — 1200000000 HC SEMI PRIVATE

## 2024-05-07 PROCEDURE — 2700000000 HC OXYGEN THERAPY PER DAY

## 2024-05-07 PROCEDURE — 6370000000 HC RX 637 (ALT 250 FOR IP): Performed by: NURSE PRACTITIONER

## 2024-05-07 PROCEDURE — 90935 HEMODIALYSIS ONE EVALUATION: CPT

## 2024-05-07 PROCEDURE — 80202 ASSAY OF VANCOMYCIN: CPT

## 2024-05-07 PROCEDURE — 71045 X-RAY EXAM CHEST 1 VIEW: CPT

## 2024-05-07 PROCEDURE — 85025 COMPLETE CBC W/AUTO DIFF WBC: CPT

## 2024-05-07 PROCEDURE — 94640 AIRWAY INHALATION TREATMENT: CPT

## 2024-05-07 RX ORDER — HYDROMORPHONE HYDROCHLORIDE 1 MG/ML
0.5 INJECTION, SOLUTION INTRAMUSCULAR; INTRAVENOUS; SUBCUTANEOUS EVERY 4 HOURS PRN
Status: DISCONTINUED | OUTPATIENT
Start: 2024-05-07 | End: 2024-05-10 | Stop reason: HOSPADM

## 2024-05-07 RX ORDER — HEPARIN SODIUM 1000 [USP'U]/ML
INJECTION, SOLUTION INTRAVENOUS; SUBCUTANEOUS
Status: COMPLETED
Start: 2024-05-07 | End: 2024-05-07

## 2024-05-07 RX ORDER — HYDRALAZINE HYDROCHLORIDE 20 MG/ML
10 INJECTION INTRAMUSCULAR; INTRAVENOUS EVERY 6 HOURS PRN
Status: DISCONTINUED | OUTPATIENT
Start: 2024-05-07 | End: 2024-05-10 | Stop reason: HOSPADM

## 2024-05-07 RX ORDER — IBUPROFEN 200 MG
TABLET ORAL
Status: DISCONTINUED | OUTPATIENT
Start: 2024-05-07 | End: 2024-05-10 | Stop reason: HOSPADM

## 2024-05-07 RX ORDER — HYDROXYZINE HYDROCHLORIDE 25 MG/1
50 TABLET, FILM COATED ORAL ONCE
Status: COMPLETED | OUTPATIENT
Start: 2024-05-07 | End: 2024-05-07

## 2024-05-07 RX ADMIN — INSULIN GLARGINE 15 UNITS: 100 INJECTION, SOLUTION SUBCUTANEOUS at 20:56

## 2024-05-07 RX ADMIN — IPRATROPIUM BROMIDE AND ALBUTEROL SULFATE 1 DOSE: .5; 2.5 SOLUTION RESPIRATORY (INHALATION) at 08:34

## 2024-05-07 RX ADMIN — IPRATROPIUM BROMIDE AND ALBUTEROL SULFATE 1 DOSE: .5; 2.5 SOLUTION RESPIRATORY (INHALATION) at 18:18

## 2024-05-07 RX ADMIN — IPRATROPIUM BROMIDE AND ALBUTEROL SULFATE 1 DOSE: .5; 2.5 SOLUTION RESPIRATORY (INHALATION) at 04:03

## 2024-05-07 RX ADMIN — Medication 10 ML: at 10:33

## 2024-05-07 RX ADMIN — SACUBITRIL AND VALSARTAN 1 TABLET: 24; 26 TABLET, FILM COATED ORAL at 20:54

## 2024-05-07 RX ADMIN — PRAVASTATIN SODIUM 40 MG: 40 TABLET ORAL at 20:54

## 2024-05-07 RX ADMIN — BACITRACIN ZINC, NEOMYCIN, POLYMYXIN B 1 G: 400; 3.5; 5 OINTMENT TOPICAL at 15:15

## 2024-05-07 RX ADMIN — SACUBITRIL AND VALSARTAN 1 TABLET: 24; 26 TABLET, FILM COATED ORAL at 08:29

## 2024-05-07 RX ADMIN — CEFEPIME 1000 MG: 1 INJECTION, POWDER, FOR SOLUTION INTRAMUSCULAR; INTRAVENOUS at 20:54

## 2024-05-07 RX ADMIN — BUSPIRONE HYDROCHLORIDE 10 MG: 5 TABLET ORAL at 08:29

## 2024-05-07 RX ADMIN — HYDRALAZINE HYDROCHLORIDE 10 MG: 20 INJECTION, SOLUTION INTRAMUSCULAR; INTRAVENOUS at 18:24

## 2024-05-07 RX ADMIN — Medication 10 ML: at 18:13

## 2024-05-07 RX ADMIN — BUSPIRONE HYDROCHLORIDE 10 MG: 5 TABLET ORAL at 20:54

## 2024-05-07 RX ADMIN — METOPROLOL SUCCINATE 25 MG: 25 TABLET, FILM COATED, EXTENDED RELEASE ORAL at 08:29

## 2024-05-07 RX ADMIN — BUSPIRONE HYDROCHLORIDE 10 MG: 5 TABLET ORAL at 17:26

## 2024-05-07 RX ADMIN — INSULIN LISPRO 4 UNITS: 100 INJECTION, SOLUTION INTRAVENOUS; SUBCUTANEOUS at 20:54

## 2024-05-07 RX ADMIN — HYDROMORPHONE HYDROCHLORIDE 0.25 MG: 1 INJECTION, SOLUTION INTRAMUSCULAR; INTRAVENOUS; SUBCUTANEOUS at 10:32

## 2024-05-07 RX ADMIN — APIXABAN 5 MG: 5 TABLET, FILM COATED ORAL at 20:54

## 2024-05-07 RX ADMIN — EPOETIN ALFA-EPBX 2000 UNITS: 2000 INJECTION, SOLUTION INTRAVENOUS; SUBCUTANEOUS at 13:58

## 2024-05-07 RX ADMIN — Medication 10 ML: at 18:25

## 2024-05-07 RX ADMIN — SODIUM CHLORIDE, PRESERVATIVE FREE 10 ML: 5 INJECTION INTRAVENOUS at 08:30

## 2024-05-07 RX ADMIN — TRAZODONE HYDROCHLORIDE 50 MG: 50 TABLET ORAL at 20:54

## 2024-05-07 RX ADMIN — OXYCODONE 10 MG: 5 TABLET ORAL at 03:53

## 2024-05-07 RX ADMIN — DULOXETINE HYDROCHLORIDE 60 MG: 60 CAPSULE, DELAYED RELEASE ORAL at 08:29

## 2024-05-07 RX ADMIN — APIXABAN 5 MG: 5 TABLET, FILM COATED ORAL at 08:29

## 2024-05-07 RX ADMIN — HEPARIN SODIUM 3600 UNITS: 1000 INJECTION INTRAVENOUS; SUBCUTANEOUS at 16:57

## 2024-05-07 RX ADMIN — HYDROXYZINE HYDROCHLORIDE 50 MG: 25 TABLET, FILM COATED ORAL at 22:04

## 2024-05-07 RX ADMIN — OXYCODONE 10 MG: 5 TABLET ORAL at 17:26

## 2024-05-07 RX ADMIN — QUETIAPINE FUMARATE 50 MG: 25 TABLET ORAL at 20:54

## 2024-05-07 RX ADMIN — OXYCODONE 10 MG: 5 TABLET ORAL at 08:29

## 2024-05-07 RX ADMIN — VANCOMYCIN HYDROCHLORIDE 500 MG: 500 INJECTION, POWDER, LYOPHILIZED, FOR SOLUTION INTRAVENOUS at 18:15

## 2024-05-07 RX ADMIN — EPOETIN ALFA-EPBX 3000 UNITS: 3000 INJECTION, SOLUTION INTRAVENOUS; SUBCUTANEOUS at 13:59

## 2024-05-07 ASSESSMENT — PAIN DESCRIPTION - PAIN TYPE
TYPE: CHRONIC PAIN;SURGICAL PAIN

## 2024-05-07 ASSESSMENT — PAIN DESCRIPTION - LOCATION
LOCATION: FOOT

## 2024-05-07 ASSESSMENT — PAIN SCALES - GENERAL
PAINLEVEL_OUTOF10: 10
PAINLEVEL_OUTOF10: 7
PAINLEVEL_OUTOF10: 7
PAINLEVEL_OUTOF10: 10
PAINLEVEL_OUTOF10: 7
PAINLEVEL_OUTOF10: 8
PAINLEVEL_OUTOF10: 0
PAINLEVEL_OUTOF10: 8
PAINLEVEL_OUTOF10: 8

## 2024-05-07 ASSESSMENT — PAIN DESCRIPTION - ORIENTATION
ORIENTATION: RIGHT

## 2024-05-07 ASSESSMENT — PAIN DESCRIPTION - ONSET
ONSET: ON-GOING

## 2024-05-07 ASSESSMENT — PAIN - FUNCTIONAL ASSESSMENT
PAIN_FUNCTIONAL_ASSESSMENT: PREVENTS OR INTERFERES WITH ALL ACTIVE AND SOME PASSIVE ACTIVITIES

## 2024-05-07 ASSESSMENT — PAIN DESCRIPTION - DESCRIPTORS
DESCRIPTORS: ACHING;SHARP
DESCRIPTORS: SHARP
DESCRIPTORS: ACHING;SHARP
DESCRIPTORS: THROBBING
DESCRIPTORS: ACHING;SHARP

## 2024-05-07 ASSESSMENT — PAIN DESCRIPTION - FREQUENCY
FREQUENCY: CONTINUOUS

## 2024-05-07 ASSESSMENT — PAIN SCALES - WONG BAKER
WONGBAKER_NUMERICALRESPONSE: NO HURT

## 2024-05-07 NOTE — PROGRESS NOTES
Hospital Medicine Progress Note      Date of Admission: 5/2/2024  Hospital Day: 6    Chief Admission Complaint:  left foot ulcer    Subjective:      Patient tells me today he is feeling badly and is having difficulty breathing. Due for HD today.    Presenting Admission History:       Igor Ann is a 54 y.o. male with pmh of CAD, cardiomyopathy, CHF with systolic dysfunction, hypertension, ESRD on HD, COPD, chronic respiratory failure and known foot ulcer who presents as a direct admit for surgical intervention with podiatry.  Patient is a high risk surgical candidate and admitted rather than have the procedure as an outpatient.  Aside from the wound on his foot patient does had not have any acute complaints today.  Denies chest pain or shortness of breath.  Has had anesthesia in the past     Assessment/Plan:      Current Principal Problem:  Right foot ulcer, with necrosis of bone (HCC)    Right foot wound S/P I&D below fascia of right foot, metatarsal bone resection right foot, and integra graft application on 05/02/24 with Dr. Berg.  Patient recently admitted and seen by podiatry for the same issue.  Was on IV antibiotics at that time.  Completed course of oral.  Margins at that time were good.  Admitted today for further surgical debridement.  Podiatry consulted  Cardiology was consulted for surgical clearance, but patient went to the OR prior to being seen.  Infectious disease consulted, awaiting surgical pathology and cultures. ID recommends continued IV abx for chronic OM attempting limb salvage - best option would be vanc and cefepime at HD - 6 weeks dating from last I&D - planned end date 6/13/24 - they will confer with Nephrology   IV Abx: Vanco + Cefepime both to be given after HD per Nephrology  Surgical culture Staph epi  Blood cultures NGTD after 4 days     Chronic respiratory failure  COPD  Not currently in exacerbation.  Continue supplemental oxygen  Continue breathing treatment    being discharged tomorrow and is appropriate for A1 Discharge Unit in AM pending clinical course overnight: []Yes  [x]No    ------------------------------------------------------------------------------------------------------------------------------------------------------------------------    MDM  (this section is simply meant as an aid to accurate billing and does not necessarily reflect ongoing patient care which is documented elsewhere in the medical record)    [x] High (any 2)    A. Problems (any 1)  [x] Acute/Chronic Illness/injury posing threat to life or bodily function:  right foot wound   [] Severe exacerbation of chronic illness:    ---------------------------------------------------------------------  B. Risk of Treatment (any 1)   [x] Drugs/treatments that require intensive monitoring for toxicity include:    [x] IV ABX requiring serial renal monitoring for nephrotoxicity:   vanco  [] Post-Cath/Contrast study requiring serial renal monitoring for Contrast Induced Nephropathy  [] IV Narcotic analgesia for ADR   [] Aggressive IV diuresis requiring serial monitoring for renal impairment and electrolyte derangements  [] Hypertonic Saline requiring serial renal monitoring for appropriate electrolyte correction rate   [] Critical electrolyte abnormalities requiring IV replacement and close serial monitoring  [] Insulin - monitoring FSBS for Hypoglycemic ADR  [] Anticoagulation requiring close serial monitoring and dose adjustments at high risk of ADR   [] HD requiring close serial monitoring of electrolytes and fluid status  [] Other -  [] Change in code status:    [] Decision to escalate care:    [] Major surgery/procedure with associated risk factors:    ----------------------------------------------------------------------  C. Data (any 2)  [] Discussed management of the case with consultants as follows:    [x] Discussed the discharge plan in detail with case mgt including timing/barriers to discharge,  8.0* 8.4* 7.8*   HCT 25.9* 26.0* 25.7*    247 261       Recent Labs     05/05/24  0559 05/06/24  0534 05/06/24  1609 05/07/24  0804   * 130*  --  129*   K 4.7 5.2* 5.2* 5.2*   CL 95* 95*  --  90*   CO2 27 23  --  22   BUN 52* 65*  --  81*   CREATININE 6.2* 7.1*  --  8.4*   CALCIUM 9.3 9.5  --  9.5       No results for input(s): \"PROBNP\", \"TROPHS\" in the last 72 hours.  No results for input(s): \"LABA1C\" in the last 72 hours.  No results for input(s): \"AST\", \"ALT\", \"BILIDIR\", \"BILITOT\", \"ALKPHOS\" in the last 72 hours.    No results for input(s): \"INR\", \"LACTA\", \"TSH\" in the last 72 hours.    Urine Cultures:   Lab Results   Component Value Date/Time    LABURIN No growth at 18 to 36 hours 05/29/2022 12:51 PM     Blood Cultures:   Lab Results   Component Value Date/Time    BC No Growth after 4 days of incubation. 05/02/2024 04:55 PM     Lab Results   Component Value Date/Time    BLOODCULT2 No Growth after 4 days of incubation. 05/02/2024 05:16 PM     Organism:   Lab Results   Component Value Date/Time    ORG Staphylococcus epidermidis 05/02/2024 01:23 PM         Ifeoma Schumacher, APRN - CNP

## 2024-05-07 NOTE — CARE COORDINATION
Writer reviewed chart day 5, Patient is from [facility]/ IV ABX/ Dialysis is T/TH/Sat, possible extra session on Wednesday. On 6 L HF O2.    Ce Jung RN

## 2024-05-07 NOTE — PROGRESS NOTES
Pt RR now 20. Oxygen at 98% on 5L. Pt feels less SOB at this time, sitting up in bed.  Xray in room. Breathing tx already administered. Pt with call light within reach. No further needs identified at this time.

## 2024-05-07 NOTE — PLAN OF CARE
Problem: Chronic Conditions and Co-morbidities  Goal: Patient's chronic conditions and co-morbidity symptoms are monitored and maintained or improved  5/6/2024 2009 by Ginny Beck RN  Outcome: Progressing  5/6/2024 1302 by Lauren Nuñez RN  Outcome: Progressing     Problem: Discharge Planning  Goal: Discharge to home or other facility with appropriate resources  5/6/2024 2009 by Ginny Beck RN  Outcome: Progressing  5/6/2024 1302 by Lauren Nuñez RN  Outcome: Progressing     Problem: Pain  Goal: Verbalizes/displays adequate comfort level or baseline comfort level  5/6/2024 2009 by Ginny Beck RN  Outcome: Progressing  5/6/2024 1302 by Lauren Nuñez RN  Outcome: Progressing     Problem: Safety - Adult  Goal: Free from fall injury  5/6/2024 2009 by Ginny Beck RN  Outcome: Progressing  5/6/2024 1302 by Lauren Nuñez RN  Outcome: Progressing     Problem: ABCDS Injury Assessment  Goal: Absence of physical injury  5/6/2024 2009 by Ginny Beck RN  Outcome: Progressing  5/6/2024 1302 by Lauren Nuñez RN  Outcome: Progressing     Problem: Nutrition Deficit:  Goal: Optimize nutritional status  5/6/2024 2009 by Ginny Beck RN  Outcome: Progressing  5/6/2024 1302 by Lauren Nuñez RN  Outcome: Progressing     Problem: Skin/Tissue Integrity  Goal: Absence of new skin breakdown  Description: 1.  Monitor for areas of redness and/or skin breakdown  2.  Assess vascular access sites hourly  3.  Every 4-6 hours minimum:  Change oxygen saturation probe site  4.  Every 4-6 hours:  If on nasal continuous positive airway pressure, respiratory therapy assess nares and determine need for appliance change or resting period.  5/6/2024 2009 by Ginny Beck RN  Outcome: Progressing  5/6/2024 1302 by Lauren Nuñez RN  Outcome: Progressing

## 2024-05-07 NOTE — PROGRESS NOTES
Podiatric Surgery Daily Progress Note  Igor Ann  Subjective :   Patient seen and examined at the bedside, just returned from HD. Reports some trouble breathing currently, discussed w/ RN. Patient denies any acute overnight events although reports continued pain to the surgical site.       Objective     BP (!) 190/97   Pulse 92   Temp 97.7 °F (36.5 °C)   Resp 24   Ht 1.753 m (5' 9\")   Wt 101.8 kg (224 lb 6.9 oz)   SpO2 98%   BMI 33.14 kg/m²      I/O:No intake or output data in the 24 hours ending 05/07/24 1856             Wt Readings from Last 3 Encounters:   05/07/24 101.8 kg (224 lb 6.9 oz)   04/11/24 98.6 kg (217 lb 6 oz)   01/24/24 85.3 kg (188 lb)       LABS:    Recent Labs     05/06/24  0638 05/07/24  0804   WBC 6.0 8.9   HGB 8.4* 7.8*   HCT 26.0* 25.7*    261        Recent Labs     05/07/24  0804   *   K 5.2*   CL 90*   CO2 22   BUN 81*   CREATININE 8.4*      No results for input(s): \"PROT\", \"INR\", \"APTT\" in the last 72 hours.        LOWER EXTREMITY EXAMINATION    Integument:  Skin is warm, dry and supple, bilateral. Integra graft intact and in place to right guillotine TMA site. No drainage, no SOI.    Neurologic:  Protective sensation is grossly diminished to light touch, bilateral.  Vascular:  DP and PT pulses are nonpalpable, bilateral.  Musculoskeletal:  Patient has 5/5 strength on inversion/everison/dorsiflexion/plantarflexion, bilateral.  No gross musculoskeletal deformities noted. Previous R TMA       Imaging: None required currently      Impression/Recommendations:    The patient is a pleasant 54 y.o. male with:  #Gangrene, right foot  #Ulcer, right midfoot with necrosis of muscle  #DMII  #PVD  #s/p R foot I&D with MT bone resection and Integra graft application (DOS 5/2/24)  - Pt continuing to do well postop, Integra graft intact w/ no SOI. MT bone C&S w/ + growth and path + for OM, ID on board w/ planned IV abx at HD. No further podiatric surgical intervention warranted

## 2024-05-07 NOTE — PROGRESS NOTES
[facility]  Nephrology f/u Note           Reason for Consult:  ESRD   Requesting Physician:  Dr. Spencer    Sub/interval history  The patient was seen and examined; he feels well today with no CP,  nausea or vomiting.  Has baseline shortness of breath.  On nasal cannula.    ROS: No fever or chills.  Social: No family at bedside.    Scheduled Meds:   vancomycin  500 mg IntraVENous Once    apixaban  5 mg Oral BID    busPIRone  10 mg Oral TID    DULoxetine  60 mg Oral Daily    insulin glargine  15 Units SubCUTAneous Nightly    metoprolol succinate  25 mg Oral Daily    pravastatin  40 mg Oral Nightly    QUEtiapine  50 mg Oral Nightly    traZODone  50 mg Oral Daily    sodium chloride flush  5-40 mL IntraVENous 2 times per day    insulin lispro  0-4 Units SubCUTAneous TID WC    insulin lispro  0-4 Units SubCUTAneous Nightly    sacubitril-valsartan  1 tablet Oral BID    vancomycin (VANCOCIN) intermittent dosing (placeholder)   Other RX Placeholder    epoetin siddharth-epbx  5,000 Units IntraVENous Once per day on Tue Thu Sat    cefepime  1,000 mg IntraVENous Q24H     Continuous Infusions:   sodium chloride Stopped (05/05/24 1533)    dextrose       PRN Meds:.HYDROmorphone, heparin (porcine), oxyCODONE, ipratropium 0.5 mg-albuterol 2.5 mg, sodium chloride flush, sodium chloride, ondansetron **OR** ondansetron, polyethylene glycol, acetaminophen **OR** acetaminophen, glucose, dextrose bolus **OR** dextrose bolus, glucagon (rDNA), dextrose    History of Present Illness on 5/2/24:    54 y.o. yo male with PMH of  ESRD, CAD, CHF, COPD, HTN, DM, CVA and HTN  who is admitted after right foot I n d and graft placement  He underwent HD at his unit in [place] where he is currently for 2h with 2.3l net UF, post wt was 97.7 kg     Physical exam:   Constitutional:  VITALS:  BP (!) 171/58   Pulse (!) 110   Temp 97.4 °F (36.3 °C)   Resp 20   Ht 1.753 m (5' 9\")   Wt 99 kg (218 lb 4.1 oz)   SpO2 95%   BMI 32.23 kg/m²     Gen:  alert, awake  Neck: No JVD  Skin: Unremarkable  Cardiovascular:  S1, S2 without m/r/g   Respiratory: CTA B without w/r/r; respiratory effort normal  Abdomen:  soft, nt, nd,   Extremities: + lower extremity edema; right leg wrapped w ace wrap  Neuro/Psy: AAoriented times 3 ; moves all 4 ext  Access: left IJ TDC    Data/  Recent Labs     05/05/24  0559 05/06/24  0638 05/07/24  0804   WBC 5.7 6.0 8.9   HGB 8.0* 8.4* 7.8*   HCT 25.9* 26.0* 25.7*   MCV 87.0 86.7 87.2    247 261       Recent Labs     05/05/24  0559 05/06/24  0534 05/06/24  1609 05/07/24  0804   * 130*  --  129*   K 4.7 5.2* 5.2* 5.2*   CL 95* 95*  --  90*   CO2 27 23  --  22   GLUCOSE 83 62*  --  141*   BUN 52* 65*  --  81*   CREATININE 6.2* 7.1*  --  8.4*   LABGLOM 10* 8*  --  7*         Assessment  -ESRD on HD TTS at [facility]   OP TW 95 kg   Weight up to 99 kg on 5/4/2024.    -Gangrene Right foot s/p I n D, metatarsal bone resection w integra graft on 5/2/24    -Anemia    -Hyponatremia    -HTN    -CKD/MBD    Plan  -HD per TTS schedule   -Will consider extra UF session on Wednesday depending on postweight  -Lokelma on nondialysis days.      -renal diet w FR 1l/day    -retacrit 5K with HD     -renal dose meds    -Antibiotics with dialysis till 6/13/2024.  Vancomycin 750 mg, cefepime 2 g after HD      Belinda Golden MD  Office: [phone]  Fax:    [phone]  [employer]

## 2024-05-07 NOTE — PLAN OF CARE
Problem: Chronic Conditions and Co-morbidities  Goal: Patient's chronic conditions and co-morbidity symptoms are monitored and maintained or improved  5/7/2024 0837 by Lauren Nuñez RN  Outcome: Progressing     Problem: Pain  Goal: Verbalizes/displays adequate comfort level or baseline comfort level  5/7/2024 0837 by Lauren Nuñez RN  Outcome: Progressing  Pt scoring pain on 0-10 scale. Pain medications given per MAR. Pt instructed to call out when pain level increasing. Call light within reach.      Problem: Safety - Adult  Goal: Free from fall injury  5/7/2024 0837 by Lauren Nuñez RN  Outcome: Progressing  Bed in lowest position, wheels locked, 2/4 side rails up, nonskid footwear on. Bed/ chair check alarm in place, call light within reach. Pt instructed to call out when needing assistance. Pt stated understanding.      Problem: ABCDS Injury Assessment  Goal: Absence of physical injury  5/7/2024 0837 by Lauren Nuñez RN  Outcome: Progressing     Problem: Skin/Tissue Integrity  Goal: Absence of new skin breakdown  Description: 1.  Monitor for areas of redness and/or skin breakdown  2.  Assess vascular access sites hourly  3.  Every 4-6 hours minimum:  Change oxygen saturation probe site  4.  Every 4-6 hours:  If on nasal continuous positive airway pressure, respiratory therapy assess nares and determine need for appliance change or resting period.  5/7/2024 0837 by Lauren Nuñez RN  Outcome: Progressing     Problem: Nutrition Deficit:  Goal: Optimize nutritional status  5/7/2024 0837 by Lauren Nuñez, RN  Outcome: Progressing

## 2024-05-08 LAB
ANION GAP SERPL CALCULATED.3IONS-SCNC: 12 MMOL/L (ref 3–16)
BASOPHILS # BLD: 0.1 K/UL (ref 0–0.2)
BASOPHILS NFR BLD: 0.9 %
BUN SERPL-MCNC: 50 MG/DL (ref 7–20)
CALCIUM SERPL-MCNC: 9.1 MG/DL (ref 8.3–10.6)
CHLORIDE SERPL-SCNC: 91 MMOL/L (ref 99–110)
CO2 SERPL-SCNC: 25 MMOL/L (ref 21–32)
CREAT SERPL-MCNC: 5.4 MG/DL (ref 0.9–1.3)
DEPRECATED RDW RBC AUTO: 18 % (ref 12.4–15.4)
EOSINOPHIL # BLD: 0.4 K/UL (ref 0–0.6)
EOSINOPHIL NFR BLD: 6.1 %
GFR SERPLBLD CREATININE-BSD FMLA CKD-EPI: 12 ML/MIN/{1.73_M2}
GLUCOSE BLD-MCNC: 125 MG/DL (ref 70–99)
GLUCOSE BLD-MCNC: 163 MG/DL (ref 70–99)
GLUCOSE BLD-MCNC: 273 MG/DL (ref 70–99)
GLUCOSE SERPL-MCNC: 180 MG/DL (ref 70–99)
HCT VFR BLD AUTO: 23.8 % (ref 40.5–52.5)
HGB BLD-MCNC: 7.6 G/DL (ref 13.5–17.5)
LYMPHOCYTES # BLD: 0.6 K/UL (ref 1–5.1)
LYMPHOCYTES NFR BLD: 8.5 %
MCH RBC QN AUTO: 27.5 PG (ref 26–34)
MCHC RBC AUTO-ENTMCNC: 32.1 G/DL (ref 31–36)
MCV RBC AUTO: 85.7 FL (ref 80–100)
MONOCYTES # BLD: 0.6 K/UL (ref 0–1.3)
MONOCYTES NFR BLD: 8.8 %
NEUTROPHILS # BLD: 5.5 K/UL (ref 1.7–7.7)
NEUTROPHILS NFR BLD: 75.7 %
PERFORMED ON: ABNORMAL
PLATELET # BLD AUTO: 225 K/UL (ref 135–450)
PMV BLD AUTO: 7.6 FL (ref 5–10.5)
POTASSIUM SERPL-SCNC: 4.1 MMOL/L (ref 3.5–5.1)
RBC # BLD AUTO: 2.78 M/UL (ref 4.2–5.9)
SODIUM SERPL-SCNC: 128 MMOL/L (ref 136–145)
WBC # BLD AUTO: 7.3 K/UL (ref 4–11)

## 2024-05-08 PROCEDURE — 6370000000 HC RX 637 (ALT 250 FOR IP): Performed by: INTERNAL MEDICINE

## 2024-05-08 PROCEDURE — 2580000003 HC RX 258: Performed by: STUDENT IN AN ORGANIZED HEALTH CARE EDUCATION/TRAINING PROGRAM

## 2024-05-08 PROCEDURE — 6370000000 HC RX 637 (ALT 250 FOR IP): Performed by: STUDENT IN AN ORGANIZED HEALTH CARE EDUCATION/TRAINING PROGRAM

## 2024-05-08 PROCEDURE — 80048 BASIC METABOLIC PNL TOTAL CA: CPT

## 2024-05-08 PROCEDURE — 94640 AIRWAY INHALATION TREATMENT: CPT

## 2024-05-08 PROCEDURE — 6360000002 HC RX W HCPCS: Performed by: STUDENT IN AN ORGANIZED HEALTH CARE EDUCATION/TRAINING PROGRAM

## 2024-05-08 PROCEDURE — 6360000002 HC RX W HCPCS: Performed by: INTERNAL MEDICINE

## 2024-05-08 PROCEDURE — 1200000000 HC SEMI PRIVATE

## 2024-05-08 PROCEDURE — 2700000000 HC OXYGEN THERAPY PER DAY

## 2024-05-08 PROCEDURE — 36415 COLL VENOUS BLD VENIPUNCTURE: CPT

## 2024-05-08 PROCEDURE — 6360000002 HC RX W HCPCS: Performed by: NURSE PRACTITIONER

## 2024-05-08 PROCEDURE — 90935 HEMODIALYSIS ONE EVALUATION: CPT

## 2024-05-08 PROCEDURE — 85025 COMPLETE CBC W/AUTO DIFF WBC: CPT

## 2024-05-08 PROCEDURE — 5A1D70Z PERFORMANCE OF URINARY FILTRATION, INTERMITTENT, LESS THAN 6 HOURS PER DAY: ICD-10-PCS | Performed by: STUDENT IN AN ORGANIZED HEALTH CARE EDUCATION/TRAINING PROGRAM

## 2024-05-08 PROCEDURE — 94761 N-INVAS EAR/PLS OXIMETRY MLT: CPT

## 2024-05-08 RX ORDER — HYDROXYZINE HYDROCHLORIDE 25 MG/1
50 TABLET, FILM COATED ORAL 3 TIMES DAILY PRN
Status: DISCONTINUED | OUTPATIENT
Start: 2024-05-08 | End: 2024-05-10 | Stop reason: HOSPADM

## 2024-05-08 RX ORDER — LORAZEPAM 2 MG/ML
1 INJECTION INTRAMUSCULAR ONCE
Status: COMPLETED | OUTPATIENT
Start: 2024-05-08 | End: 2024-05-08

## 2024-05-08 RX ORDER — IPRATROPIUM BROMIDE AND ALBUTEROL SULFATE 2.5; .5 MG/3ML; MG/3ML
1 SOLUTION RESPIRATORY (INHALATION)
Status: DISCONTINUED | OUTPATIENT
Start: 2024-05-08 | End: 2024-05-10 | Stop reason: HOSPADM

## 2024-05-08 RX ADMIN — APIXABAN 5 MG: 5 TABLET, FILM COATED ORAL at 09:16

## 2024-05-08 RX ADMIN — QUETIAPINE FUMARATE 50 MG: 25 TABLET ORAL at 20:23

## 2024-05-08 RX ADMIN — BUSPIRONE HYDROCHLORIDE 10 MG: 5 TABLET ORAL at 09:16

## 2024-05-08 RX ADMIN — SODIUM CHLORIDE, PRESERVATIVE FREE 10 ML: 5 INJECTION INTRAVENOUS at 09:17

## 2024-05-08 RX ADMIN — LORAZEPAM 1 MG: 2 INJECTION INTRAMUSCULAR; INTRAVENOUS at 02:25

## 2024-05-08 RX ADMIN — SACUBITRIL AND VALSARTAN 1 TABLET: 24; 26 TABLET, FILM COATED ORAL at 20:23

## 2024-05-08 RX ADMIN — DULOXETINE HYDROCHLORIDE 60 MG: 60 CAPSULE, DELAYED RELEASE ORAL at 09:16

## 2024-05-08 RX ADMIN — APIXABAN 5 MG: 5 TABLET, FILM COATED ORAL at 20:23

## 2024-05-08 RX ADMIN — INSULIN GLARGINE 15 UNITS: 100 INJECTION, SOLUTION SUBCUTANEOUS at 20:23

## 2024-05-08 RX ADMIN — TRAZODONE HYDROCHLORIDE 50 MG: 50 TABLET ORAL at 20:23

## 2024-05-08 RX ADMIN — IPRATROPIUM BROMIDE AND ALBUTEROL SULFATE 1 DOSE: .5; 2.5 SOLUTION RESPIRATORY (INHALATION) at 20:00

## 2024-05-08 RX ADMIN — IPRATROPIUM BROMIDE AND ALBUTEROL SULFATE 1 DOSE: .5; 2.5 SOLUTION RESPIRATORY (INHALATION) at 07:50

## 2024-05-08 RX ADMIN — CEFEPIME 1000 MG: 1 INJECTION, POWDER, FOR SOLUTION INTRAMUSCULAR; INTRAVENOUS at 20:22

## 2024-05-08 RX ADMIN — BUSPIRONE HYDROCHLORIDE 10 MG: 5 TABLET ORAL at 19:16

## 2024-05-08 RX ADMIN — METOPROLOL SUCCINATE 25 MG: 25 TABLET, FILM COATED, EXTENDED RELEASE ORAL at 09:16

## 2024-05-08 RX ADMIN — PRAVASTATIN SODIUM 40 MG: 40 TABLET ORAL at 20:23

## 2024-05-08 RX ADMIN — SACUBITRIL AND VALSARTAN 1 TABLET: 24; 26 TABLET, FILM COATED ORAL at 09:16

## 2024-05-08 RX ADMIN — HEPARIN SODIUM 3600 UNITS: 1000 INJECTION INTRAVENOUS; SUBCUTANEOUS at 15:01

## 2024-05-08 ASSESSMENT — PAIN SCALES - WONG BAKER
WONGBAKER_NUMERICALRESPONSE: NO HURT

## 2024-05-08 ASSESSMENT — PAIN SCALES - GENERAL: PAINLEVEL_OUTOF10: 8

## 2024-05-08 NOTE — DIALYSIS
Treatment time: 3.5 hours  Net UF: 4001 ml     Pre weight: 103 kg  Post weight:99 kg  EDW: 95 kg    Access used: L TDC    Access function: good with  ml/min     Medications or blood products given: Heparin dwells 3600 units-1.8ml each lumen     Regular outpatient schedule: MWF (EXTRA TX TODAY FOR FLUID REMOVAL)     Summary of response to treatment: Patient tolerated treatment well and without any complications. Patient remained stable throughout entire treatment and upon the exiting the dialysis suite via transport.     Report given to Mary Horn RN and copy of dialysis treatment record placed in chart, to be scanned into EMR.

## 2024-05-08 NOTE — PROGRESS NOTES
[facility]  Nephrology f/u Note           Reason for Consult:  ESRD   Requesting Physician:  Dr. Spencer    Sub/interval history  The patient was seen and examined; he feels well today with no CP,  nausea or vomiting.  Has baseline shortness of breath.  On nasal cannula.  Last HD on 5/7/2024.  UF of 3.6 L.  Postweight 101.8 kg    ROS: No fever or chills.  Social: No family at bedside.    Scheduled Meds:   neomycin-bacitracin-polymyxin   Topical Once per day on Tue Thu Sat    apixaban  5 mg Oral BID    busPIRone  10 mg Oral TID    DULoxetine  60 mg Oral Daily    insulin glargine  15 Units SubCUTAneous Nightly    metoprolol succinate  25 mg Oral Daily    pravastatin  40 mg Oral Nightly    QUEtiapine  50 mg Oral Nightly    traZODone  50 mg Oral Daily    sodium chloride flush  5-40 mL IntraVENous 2 times per day    insulin lispro  0-4 Units SubCUTAneous TID WC    insulin lispro  0-4 Units SubCUTAneous Nightly    sacubitril-valsartan  1 tablet Oral BID    vancomycin (VANCOCIN) intermittent dosing (placeholder)   Other RX Placeholder    epoetin siddharth-epbx  5,000 Units IntraVENous Once per day on Tue Thu Sat    cefepime  1,000 mg IntraVENous Q24H     Continuous Infusions:   sodium chloride 20 mL/hr at 05/07/24 1814    dextrose       PRN Meds:.HYDROmorphone, hydrALAZINE, heparin (porcine), oxyCODONE, ipratropium 0.5 mg-albuterol 2.5 mg, sodium chloride flush, sodium chloride, ondansetron **OR** ondansetron, polyethylene glycol, acetaminophen **OR** acetaminophen, glucose, dextrose bolus **OR** dextrose bolus, glucagon (rDNA), dextrose    History of Present Illness on 5/2/24:    54 y.o. yo male with PMH of  ESRD, CAD, CHF, COPD, HTN, DM, CVA and HTN  who is admitted after right foot I n d and graft placement  He underwent HD at his unit in [place] where he is currently for 2h with 2.3l net UF, post wt was 97.7 kg     Physical exam:   Constitutional:  VITALS:  BP (!) 162/91   Pulse 92   Temp 97.9 °F (36.6 °C)  (Oral)   Resp 20   Ht 1.753 m (5' 9\")   Wt 101.8 kg (224 lb 6.9 oz)   SpO2 95%   BMI 33.14 kg/m²     Gen: alert, awake  Neck: No JVD  Skin: Unremarkable  Cardiovascular:  S1, S2 without m/r/g   Respiratory: CTA B without w/r/r; respiratory effort normal  Abdomen:  soft, nt, nd,   Extremities: + lower extremity edema; right leg wrapped w ace wrap  Neuro/Psy: AAoriented times 3 ; moves all 4 ext  Access: left IJ TDC    Data/  Recent Labs     05/06/24  0638 05/07/24  0804 05/08/24  0629   WBC 6.0 8.9 7.3   HGB 8.4* 7.8* 7.6*   HCT 26.0* 25.7* 23.8*   MCV 86.7 87.2 85.7    261 225       Recent Labs     05/06/24  0534 05/06/24  1609 05/07/24  0804 05/08/24  0629   *  --  129* 128*   K 5.2* 5.2* 5.2* 4.1   CL 95*  --  90* 91*   CO2 23  --  22 25   GLUCOSE 62*  --  141* 180*   BUN 65*  --  81* 50*   CREATININE 7.1*  --  8.4* 5.4*   LABGLOM 8*  --  7* 12*         Assessment  -ESRD on HD TTS at [facility]   OP TW 95 kg   Weight up to 99 kg on 5/4/2024.    -Gangrene Right foot s/p I n D, metatarsal bone resection w integra graft on 5/2/24    -Anemia    -Hyponatremia    -HTN    -CKD/MBD    Plan  -HD per TTS schedule   -Additional HD session today to help with volume and hyponatremia  -Lokelma on nondialysis days.      -renal diet w FR 1l/day    -retacrit 5K with HD     -renal dose meds  -Exit site infection.  Redness crusting seen.  Started topical antibiotics.  Already on IV antibiotics.  ID on board.  Blood cultures from 5/2/2024 have been negative so far.  -Continue to monitor exit site on topical and iv abx, if no improvement in 10-14 days, will need new site for TDC ( e has had trouble getting access sites in past)    -Antibiotics with dialysis till 6/13/2024.  Vancomycin 750 mg, cefepime 2 g after HD      Belinda Golden MD  Office: [phone]  Fax:    [phone]  [facility]

## 2024-05-08 NOTE — RT PROTOCOL NOTE
RT Inhaler-Nebulizer Bronchodilator Protocol Note    There is a bronchodilator order in the chart from a provider indicating to follow the RT Bronchodilator Protocol and there is an “Initiate RT Inhaler-Nebulizer Bronchodilator Protocol” order as well (see protocol at bottom of note).    CXR Findings:  XR CHEST PORTABLE    Result Date: 5/7/2024  Decreased pulmonary vascular congestion and edema     XR CHEST PORTABLE    Result Date: 5/6/2024  1. Congestive heart failure. 2. Small bilateral pleural effusions and adjacent airspace opacities at the lung bases favored to represent atelectasis or edema.  Pneumonia is considered less likely.       The findings from the last RT Protocol Assessment were as follows:   History Pulmonary Disease: Chronic pulmonary disease  Respiratory Pattern: Dyspnea on exertion or RR 21-25 bpm  Breath Sounds: Slightly diminished and/or crackles  Cough: Strong, spontaneous, non-productive  Indication for Bronchodilator Therapy: Decreased or absent breath sounds  Bronchodilator Assessment Score: 6    Aerosolized bronchodilator medication orders have been revised according to the RT Inhaler-Nebulizer Bronchodilator Protocol below.    Respiratory Therapist to perform RT Therapy Protocol Assessment initially then follow the protocol.  Repeat RT Therapy Protocol Assessment PRN for score 0-3 or on second treatment, BID, and PRN for scores above 3.    No Indications - adjust the frequency to every 6 hours PRN wheezing or bronchospasm, if no treatments needed after 48 hours then discontinue using Per Protocol order mode.     If indication present, adjust the RT bronchodilator orders based on the Bronchodilator Assessment Score as indicated below.  Use Inhaler orders unless patient has one or more of the following: on home nebulizer, not able to hold breath for 10 seconds, is not alert and oriented, cannot activate and use MDI correctly, or respiratory rate 25 breaths per minute or more, then use the  equivalent nebulizer order(s) with same Frequency and PRN reasons based on the score.  If a patient is on this medication at home then do not decrease Frequency below that used at home.    0-3 - enter or revise RT bronchodilator order(s) to equivalent RT Bronchodilator order with Frequency of every 4 hours PRN for wheezing or increased work of breathing using Per Protocol order mode.        4-6 - enter or revise RT Bronchodilator order(s) to two equivalent RT bronchodilator orders with one order with BID Frequency and one order with Frequency of every 4 hours PRN wheezing or increased work of breathing using Per Protocol order mode.        7-10 - enter or revise RT Bronchodilator order(s) to two equivalent RT bronchodilator orders with one order with TID Frequency and one order with Frequency of every 4 hours PRN wheezing or increased work of breathing using Per Protocol order mode.       11-13 - enter or revise RT Bronchodilator order(s) to one equivalent RT bronchodilator order with QID Frequency and an Albuterol order with Frequency of every 4 hours PRN wheezing or increased work of breathing using Per Protocol order mode.      Greater than 13 - enter or revise RT Bronchodilator order(s) to one equivalent RT bronchodilator order with every 4 hours Frequency and an Albuterol order with Frequency of every 2 hours PRN wheezing or increased work of breathing using Per Protocol order mode.     RT to enter RT Home Evaluation for COPD & MDI Assessment order using Per Protocol order mode.    Electronically signed by Trang Vitale RCP on 5/8/2024 at 11:23 AM

## 2024-05-08 NOTE — CONSULTS
PALLIATIVE MEDICINE CONSULTATION     Patient name:Igor Ann   MRN:[id number]    :1970  Room/Bed:0538/0538-01   LOS: 6 days         Date of consult:2024    Inpatient consult to Palliative Care  Consult performed by: Emeli Gonzalez APRN - CNP  Consult ordered by: Ifeoma Schumacher, JAY Hu CNP              ASSESSMENT/RECOMMENDATIONS     54 y.o. male from SNF with ESRD, CHF, CAD, COPD and DM admitted with nonhealing right TMA wound. Now S/P debridement and further resection with podiatry 24.  Pathology report shows evidence of osteomyelitis.  Patient will need long term antibiotics at discharge     Current admission is at least #13 in past 12 months        Goals of Care- Longevity.  Continue with aggressive disease-directed therapies and full code status at this time.      Patient has been inconsistent at times about his overall GOC and has previously shared concerns that he feels he has to stay alive for the sake of his wife's mental health. Started discussion with wife today in attempts to educate her on Ron's numerous medical challenges and importance of managing reasonable expectations for his prognosis. Will send referral for  support and home palliative care (vs other possible agency that could help support wife directly).          Patient/Family Goals of Care :    24    Patient is well known to the palliative care service and historically has said he is only doing dialysis (and other aggressive medical care) to stay alive for his wife.  He says wife has not been agreeable to hospice.  At one point patient admitted to considering enrollment in hospice without telling his wife and going to a friend's home to die.  He has not wanted us to have hard discussions with his wife in the past because they upset her so much and she takes it out on him.     Spoke with patient at the bedside in dialysis.  He is alert and oriented and appears decisional.   Patient is well known to me from multiple prior consults.  He appears much weaker today than when I saw him last year.      Explained again the role of Palliative Care.  Patient agreeable to discussion with me today,but says he does not feel good.     Reviewed the new Advance Directives in the chart with him from January 2024. He removed his wife Crystal as a proxy. He confirms they are still  and living together, but he does not want her making decisions for him.  He says we can still call her and talk with her if we want.    Reviewed the hand-written in:  \"DNR on file\" on the AD.  Patient states he should NOT have a DNR.  He is not sure why that is written there.  He says \"I still want you guys to put me on a ventilator and CPR me if I need it\".  As with previous conversations, shared my concern for how his body would tolerate such aggressive interventions.  Shared my concern that he is looking weaker than when we last talked.  He says he does not want to die.  If he can be kept alive on life support, he wants that.   Did not belabor the topic further at this time as this is consistent with patient's wishes historically.    Attempted discussion about how things are going at home.  He denies any significant issues.  He says he can't recall missing a dialysis appointment.  He says \"no matter what, I will always make dialysis\".  Discussed his medications and any issues he might have keeping those straight.  He thinks he has been doing ok, but he says he is not sure.  He would welcome assistance with them if offered.  He says he does not feel very well right now and asks if we could just talk later.  He closed his eyes.     Called and spoke with wife, Crystal.  Introduced myself to her (we have spoken previously) and reminded her of the role of palliative care.  She seemed panicked and said \"oh my god what's wrong with Ron?\"  She states she has not been able to get a hold of him all morning.     She updated

## 2024-05-08 NOTE — PROGRESS NOTES
Hospital Medicine Progress Note      Date of Admission: 5/2/2024  Hospital Day: 7    Chief Admission Complaint:  left foot ulcer    Subjective:      Patient sleeping when I arrive. He is easily awakened. He states he feels poorly today still. We discuss that he will most likely have HD again today. He understands    Presenting Admission History:       Igor Ann is a 54 y.o. male with pmh of CAD, cardiomyopathy, CHF with systolic dysfunction, hypertension, ESRD on HD, COPD, chronic respiratory failure and known foot ulcer who presents as a direct admit for surgical intervention with podiatry.  Patient is a high risk surgical candidate and admitted rather than have the procedure as an outpatient.  Aside from the wound on his foot patient does had not have any acute complaints today.  Denies chest pain or shortness of breath.  Has had anesthesia in the past     Assessment/Plan:      Current Principal Problem:  Right foot ulcer, with necrosis of bone (HCC)    Right foot wound S/P I&D below fascia of right foot, metatarsal bone resection right foot, and integra graft application on 05/02/24 with Dr. Berg.  Patient recently admitted and seen by podiatry for the same issue.  Was on IV antibiotics at that time.  Completed course of oral.  Margins at that time were good.  Admitted today for further surgical debridement.  Podiatry consulted  Cardiology was consulted for surgical clearance, but patient went to the OR prior to being seen.  Infectious disease consulted, awaiting surgical pathology and cultures. ID recommends continued IV abx for chronic OM attempting limb salvage - best option would be vanc and cefepime at HD - 6 weeks dating from last I&D - planned end date 6/13/24 - they will confer with Nephrology   IV Abx: Vanco + Cefepime both to be given after HD per Nephrology  Surgical culture Staph epi  Blood cultures NGTD after 4 days     Chronic respiratory failure/COPD not in exacerbation.  Continue    ----------------------------------------------------------------------  C. Data (any 2)  [] Discussed management of the case with consultants as follows:    [x] Discussed the discharge plan in detail with case mgt including timing/barriers to discharge, need for support services and placement decision   [x] Imaging personally reviewed and interpreted, includes:   [x] Telemetry monitoring as noted above  [x] Data Review (any 3)  [x] Collateral history obtained from:  pt, review of emr  [x] All available Consultant notes from yesterday/today were reviewed  [x] All current labs were reviewed and interpreted for clinical significance   [x] Appropriate follow-up labs were ordered    Medications:  Personally reviewed in detail in conjunction w/ labs as documented for evidence of drug toxicity.     Infusion Medications    sodium chloride 20 mL/hr at 05/07/24 1814    dextrose       Scheduled Medications    neomycin-bacitracin-polymyxin   Topical Once per day on Tue Thu Sat    apixaban  5 mg Oral BID    busPIRone  10 mg Oral TID    DULoxetine  60 mg Oral Daily    insulin glargine  15 Units SubCUTAneous Nightly    metoprolol succinate  25 mg Oral Daily    pravastatin  40 mg Oral Nightly    QUEtiapine  50 mg Oral Nightly    traZODone  50 mg Oral Daily    sodium chloride flush  5-40 mL IntraVENous 2 times per day    insulin lispro  0-4 Units SubCUTAneous TID     insulin lispro  0-4 Units SubCUTAneous Nightly    sacubitril-valsartan  1 tablet Oral BID    vancomycin (VANCOCIN) intermittent dosing (placeholder)   Other RX Placeholder    epoetin siddharth-epbx  5,000 Units IntraVENous Once per day on Tue Thu Sat    cefepime  1,000 mg IntraVENous Q24H     PRN Meds: HYDROmorphone, hydrALAZINE, heparin (porcine), oxyCODONE, ipratropium 0.5 mg-albuterol 2.5 mg, sodium chloride flush, sodium chloride, ondansetron **OR** ondansetron, polyethylene glycol, acetaminophen **OR** acetaminophen, glucose, dextrose bolus **OR** dextrose bolus,

## 2024-05-08 NOTE — PROGRESS NOTES
ABG attempted in right radial, pt reports no sticks in left arm. No sample obtained. Patient clenching wrist muscles, and shaking. Notified nursing.

## 2024-05-08 NOTE — PROGRESS NOTES
Occupational / Physical Therapy    Pt chart reviewed and RN approved pt for participation in therapy this date. Transport present in pt room on arrival prepping for transport off unit for HD. Will follow up later once pt returns to floor, schedule permitting. Thank you.    Jad Guerin OTR/L  Britney Kidd, PT, DPT

## 2024-05-08 NOTE — PROGRESS NOTES
Pt lost IV access.  Per Cate Light OK to use left arm after many failed attempts to get access on right side.  Ultrasound was used also and unable to get one.

## 2024-05-08 NOTE — PLAN OF CARE
Problem: Chronic Conditions and Co-morbidities  Goal: Patient's chronic conditions and co-morbidity symptoms are monitored and maintained or improved  5/7/2024 2216 by Parker Mckeon RN  Outcome: Progressing  5/7/2024 0837 by Lauren Nuñez RN  Outcome: Progressing     Problem: Discharge Planning  Goal: Discharge to home or other facility with appropriate resources  Outcome: Progressing     Problem: Pain  Goal: Verbalizes/displays adequate comfort level or baseline comfort level  5/7/2024 2216 by Parker Mckeon RN  Outcome: Progressing  5/7/2024 0837 by Lauren Nuñez RN  Outcome: Progressing     Problem: Safety - Adult  Goal: Free from fall injury  5/7/2024 2216 by Parker Mckeon RN  Outcome: Progressing  5/7/2024 0837 by Lauren Nuñez RN  Outcome: Progressing     Problem: ABCDS Injury Assessment  Goal: Absence of physical injury  5/7/2024 2216 by Parker Mckeon RN  Outcome: Progressing  5/7/2024 0837 by Lauren Nuñez RN  Outcome: Progressing     Problem: Skin/Tissue Integrity  Goal: Absence of new skin breakdown  Description: 1.  Monitor for areas of redness and/or skin breakdown  2.  Assess vascular access sites hourly  3.  Every 4-6 hours minimum:  Change oxygen saturation probe site  4.  Every 4-6 hours:  If on nasal continuous positive airway pressure, respiratory therapy assess nares and determine need for appliance change or resting period.  5/7/2024 2216 by Parker Mckeon RN  Outcome: Progressing  5/7/2024 0837 by Lauren Nuñez RN  Outcome: Progressing

## 2024-05-09 LAB
ANION GAP SERPL CALCULATED.3IONS-SCNC: 14 MMOL/L (ref 3–16)
BASOPHILS # BLD: 0.1 K/UL (ref 0–0.2)
BASOPHILS NFR BLD: 0.9 %
BUN SERPL-MCNC: 31 MG/DL (ref 7–20)
CALCIUM SERPL-MCNC: 9.3 MG/DL (ref 8.3–10.6)
CHLORIDE SERPL-SCNC: 93 MMOL/L (ref 99–110)
CO2 SERPL-SCNC: 23 MMOL/L (ref 21–32)
CREAT SERPL-MCNC: 4 MG/DL (ref 0.9–1.3)
DEPRECATED RDW RBC AUTO: 17.6 % (ref 12.4–15.4)
EOSINOPHIL # BLD: 0.5 K/UL (ref 0–0.6)
EOSINOPHIL NFR BLD: 7 %
GFR SERPLBLD CREATININE-BSD FMLA CKD-EPI: 17 ML/MIN/{1.73_M2}
GLUCOSE BLD-MCNC: 105 MG/DL (ref 70–99)
GLUCOSE BLD-MCNC: 162 MG/DL (ref 70–99)
GLUCOSE SERPL-MCNC: 96 MG/DL (ref 70–99)
HCT VFR BLD AUTO: 25.4 % (ref 40.5–52.5)
HGB BLD-MCNC: 8.1 G/DL (ref 13.5–17.5)
LYMPHOCYTES # BLD: 0.7 K/UL (ref 1–5.1)
LYMPHOCYTES NFR BLD: 9.7 %
MCH RBC QN AUTO: 27.4 PG (ref 26–34)
MCHC RBC AUTO-ENTMCNC: 32 G/DL (ref 31–36)
MCV RBC AUTO: 85.6 FL (ref 80–100)
MONOCYTES # BLD: 0.6 K/UL (ref 0–1.3)
MONOCYTES NFR BLD: 8.5 %
NEUTROPHILS # BLD: 5.6 K/UL (ref 1.7–7.7)
NEUTROPHILS NFR BLD: 73.9 %
PERFORMED ON: ABNORMAL
PERFORMED ON: ABNORMAL
PLATELET # BLD AUTO: 234 K/UL (ref 135–450)
PMV BLD AUTO: 7.3 FL (ref 5–10.5)
POTASSIUM SERPL-SCNC: 4.3 MMOL/L (ref 3.5–5.1)
RBC # BLD AUTO: 2.96 M/UL (ref 4.2–5.9)
SODIUM SERPL-SCNC: 130 MMOL/L (ref 136–145)
VANCOMYCIN SERPL-MCNC: 18.3 UG/ML
WBC # BLD AUTO: 7.6 K/UL (ref 4–11)

## 2024-05-09 PROCEDURE — 97116 GAIT TRAINING THERAPY: CPT

## 2024-05-09 PROCEDURE — 2580000003 HC RX 258: Performed by: STUDENT IN AN ORGANIZED HEALTH CARE EDUCATION/TRAINING PROGRAM

## 2024-05-09 PROCEDURE — 1200000000 HC SEMI PRIVATE

## 2024-05-09 PROCEDURE — 97535 SELF CARE MNGMENT TRAINING: CPT

## 2024-05-09 PROCEDURE — 80048 BASIC METABOLIC PNL TOTAL CA: CPT

## 2024-05-09 PROCEDURE — 6370000000 HC RX 637 (ALT 250 FOR IP): Performed by: NURSE PRACTITIONER

## 2024-05-09 PROCEDURE — 36556 INSERT NON-TUNNEL CV CATH: CPT

## 2024-05-09 PROCEDURE — 6370000000 HC RX 637 (ALT 250 FOR IP): Performed by: INTERNAL MEDICINE

## 2024-05-09 PROCEDURE — 6370000000 HC RX 637 (ALT 250 FOR IP): Performed by: STUDENT IN AN ORGANIZED HEALTH CARE EDUCATION/TRAINING PROGRAM

## 2024-05-09 PROCEDURE — 6360000002 HC RX W HCPCS

## 2024-05-09 PROCEDURE — 6360000002 HC RX W HCPCS: Performed by: STUDENT IN AN ORGANIZED HEALTH CARE EDUCATION/TRAINING PROGRAM

## 2024-05-09 PROCEDURE — 2580000003 HC RX 258: Performed by: INTERNAL MEDICINE

## 2024-05-09 PROCEDURE — 97530 THERAPEUTIC ACTIVITIES: CPT

## 2024-05-09 PROCEDURE — 90935 HEMODIALYSIS ONE EVALUATION: CPT

## 2024-05-09 PROCEDURE — 85025 COMPLETE CBC W/AUTO DIFF WBC: CPT

## 2024-05-09 PROCEDURE — 2700000000 HC OXYGEN THERAPY PER DAY

## 2024-05-09 PROCEDURE — 80202 ASSAY OF VANCOMYCIN: CPT

## 2024-05-09 PROCEDURE — 97110 THERAPEUTIC EXERCISES: CPT

## 2024-05-09 PROCEDURE — 97166 OT EVAL MOD COMPLEX 45 MIN: CPT

## 2024-05-09 PROCEDURE — 94761 N-INVAS EAR/PLS OXIMETRY MLT: CPT

## 2024-05-09 PROCEDURE — 6360000002 HC RX W HCPCS: Performed by: INTERNAL MEDICINE

## 2024-05-09 PROCEDURE — 94640 AIRWAY INHALATION TREATMENT: CPT

## 2024-05-09 RX ORDER — HEPARIN SODIUM 5000 [USP'U]/ML
INJECTION, SOLUTION INTRAVENOUS; SUBCUTANEOUS
Status: COMPLETED
Start: 2024-05-09 | End: 2024-05-09

## 2024-05-09 RX ADMIN — OXYCODONE 10 MG: 5 TABLET ORAL at 21:03

## 2024-05-09 RX ADMIN — APIXABAN 5 MG: 5 TABLET, FILM COATED ORAL at 11:36

## 2024-05-09 RX ADMIN — DULOXETINE HYDROCHLORIDE 60 MG: 60 CAPSULE, DELAYED RELEASE ORAL at 11:36

## 2024-05-09 RX ADMIN — VANCOMYCIN HYDROCHLORIDE 500 MG: 500 INJECTION, POWDER, LYOPHILIZED, FOR SOLUTION INTRAVENOUS at 15:01

## 2024-05-09 RX ADMIN — INSULIN GLARGINE 15 UNITS: 100 INJECTION, SOLUTION SUBCUTANEOUS at 20:59

## 2024-05-09 RX ADMIN — SACUBITRIL AND VALSARTAN 1 TABLET: 24; 26 TABLET, FILM COATED ORAL at 20:56

## 2024-05-09 RX ADMIN — TRAZODONE HYDROCHLORIDE 50 MG: 50 TABLET ORAL at 20:55

## 2024-05-09 RX ADMIN — BACITRACIN ZINC, NEOMYCIN, POLYMYXIN B: 400; 3.5; 5 OINTMENT TOPICAL at 13:14

## 2024-05-09 RX ADMIN — CEFEPIME 1000 MG: 1 INJECTION, POWDER, FOR SOLUTION INTRAMUSCULAR; INTRAVENOUS at 21:03

## 2024-05-09 RX ADMIN — BUSPIRONE HYDROCHLORIDE 10 MG: 5 TABLET ORAL at 11:36

## 2024-05-09 RX ADMIN — OXYCODONE 10 MG: 5 TABLET ORAL at 09:05

## 2024-05-09 RX ADMIN — IPRATROPIUM BROMIDE AND ALBUTEROL SULFATE 1 DOSE: .5; 2.5 SOLUTION RESPIRATORY (INHALATION) at 09:37

## 2024-05-09 RX ADMIN — HEPARIN SODIUM 5000 UNITS: 5000 INJECTION INTRAVENOUS; SUBCUTANEOUS at 13:11

## 2024-05-09 RX ADMIN — BUSPIRONE HYDROCHLORIDE 10 MG: 5 TABLET ORAL at 20:56

## 2024-05-09 RX ADMIN — BUSPIRONE HYDROCHLORIDE 10 MG: 5 TABLET ORAL at 14:57

## 2024-05-09 RX ADMIN — METOPROLOL SUCCINATE 25 MG: 25 TABLET, FILM COATED, EXTENDED RELEASE ORAL at 11:36

## 2024-05-09 RX ADMIN — APIXABAN 5 MG: 5 TABLET, FILM COATED ORAL at 20:55

## 2024-05-09 RX ADMIN — OXYCODONE 10 MG: 5 TABLET ORAL at 14:56

## 2024-05-09 RX ADMIN — QUETIAPINE FUMARATE 50 MG: 25 TABLET ORAL at 20:56

## 2024-05-09 RX ADMIN — IPRATROPIUM BROMIDE AND ALBUTEROL SULFATE 1 DOSE: .5; 2.5 SOLUTION RESPIRATORY (INHALATION) at 20:12

## 2024-05-09 RX ADMIN — SACUBITRIL AND VALSARTAN 1 TABLET: 24; 26 TABLET, FILM COATED ORAL at 11:36

## 2024-05-09 RX ADMIN — PRAVASTATIN SODIUM 40 MG: 40 TABLET ORAL at 20:55

## 2024-05-09 ASSESSMENT — PAIN SCALES - GENERAL
PAINLEVEL_OUTOF10: 10
PAINLEVEL_OUTOF10: 8
PAINLEVEL_OUTOF10: 9
PAINLEVEL_OUTOF10: 9

## 2024-05-09 ASSESSMENT — PAIN SCALES - WONG BAKER: WONGBAKER_NUMERICALRESPONSE: HURTS WHOLE LOT

## 2024-05-09 ASSESSMENT — PAIN DESCRIPTION - DESCRIPTORS
DESCRIPTORS: ACHING;SHARP
DESCRIPTORS: PRESSURE;SHARP;DISCOMFORT

## 2024-05-09 ASSESSMENT — PAIN DESCRIPTION - LOCATION
LOCATION: GENERALIZED
LOCATION: FOOT;BACK
LOCATION: GENERALIZED
LOCATION: ANKLE;FOOT;BACK

## 2024-05-09 ASSESSMENT — PAIN DESCRIPTION - ORIENTATION: ORIENTATION: RIGHT

## 2024-05-09 NOTE — PROGRESS NOTES
Occupational/ Physical Therapy    Pt chart reviewed and RN contacted regarding pt appropriate for therapy this date. Pt off floor at HD at time of therapy arrival. Will follow up later this date, schedule permitting. Thank you.    Jad Guerin OTR/АННА Hurst, PTA

## 2024-05-09 NOTE — PROGRESS NOTES
Occupational Therapy  Facility/Department: [facility] [address] - MED SURG/ORTHO  Occupational Therapy Initial Assessment    Name: Igor Ann  : 1970  MRN: [id number]  Date of Service: 2024    Discharge Recommendations:  Subacute/Skilled Nursing Facility (if pt refuses then home  + HHOT)  OT Equipment Recommendations  Equipment Needed: No (defer)       Patient Diagnosis(es): The primary encounter diagnosis was Chronic osteomyelitis involving right ankle and foot (HCC). A diagnosis of Gangrene of right foot (HCC) was also pertinent to this visit.  Past Medical History:  has a past medical history of Ambulatory dysfunction, Aortic stenosis, Arthritis, Asthma, Bilateral hilar adenopathy syndrome, CAD (coronary artery disease), Cardiomyopathy (HCC), CHF (congestive heart failure) (HCC), Chronic pain, COPD (chronic obstructive pulmonary disease) (HCC), CVA (cerebral vascular accident) (HCC), Depression, Diabetes mellitus (HCC), Difficult intravenous access, Emphysema of lung (HCC), ESRD (end stage renal disease) on dialysis (HCC), Fear of needles, Gastric ulcer, GERD (gastroesophageal reflux disease), Heart valve problem, Hemodialysis patient (HCC), History of spinal fracture, Hx of blood clots, Hyperlipidemia, Hypertension, MI (myocardial infarction) (HCC), Neuromuscular disorder (HCC), Numbness and tingling in left arm, Pneumonia, PONV (postoperative nausea and vomiting), Prolonged emergence from general anesthesia, Sleep apnea, Stroke (HCC), TIA (transient ischemic attack), and Unspecified diseases of blood and blood-forming organs.  Past Surgical History:  has a past surgical history that includes Tonsillectomy; cyst removal (2013); Colonoscopy; Coronary angioplasty with stent (05/26/15); vascular surgery (aprx 2 years ago); Colonoscopy (); Upper gastrointestinal endoscopy (2016); Upper gastrointestinal endoscopy (2017); other surgical history (2017); Dialysis fistula  education/ cues for safety and precautions)  Stand to Sit: Moderate assistance;Assist X2;Adaptive equipment (c STEDY)  Toilet Transfer: Total assistance;Assist X2;Adaptive equipment (pt initially transfer to commode CGA c RW but pt unsafe w/ poor compliance to RLE WB precautions, used STEDY to stand mod x2 from commode)     AROM: Within functional limits  Strength: Within functional limits  Coordination: Within functional limits  Tone: Normal  Sensation: Intact  ADL  Toileting: Moderate assistance  Toileting Skilled Clinical Factors: CGA CM up/down in stance, max A pericare post BM  Functional Mobility: Contact guard assistance;Adaptive equipment;Dependent/Total  Functional Mobility Skilled Clinical Factors: Pt completed functional mob to bathroom unsafely and fully bearing wt through RLE despite max cues / education, pt returned EOB via STEDY for safety and pt continuing to bear weight on RLE.  Skin Care: Bath wipes     Activity Tolerance  Activity Tolerance: Patient tolerated treatment well;Patient limited by endurance;Patient limited by fatigue        Vision  Vision: Impaired  Vision Exceptions: Wears glasses for reading;Wears glasses for distance;Wears glasses at all times  Hearing  Hearing: Within functional limits  Cognition  Overall Cognitive Status: Exceptions  Arousal/Alertness: Appears intact  Following Commands: Inconsistently follows commands  Attention Span: Appears intact  Memory: Appears intact  Safety Judgement: Impaired;Decreased awareness of need for assistance;Decreased awareness of need for safety  Problem Solving: Impaired;Assistance required to identify errors made;Assistance required to correct errors made;Decreased awareness of errors;Assistance required to generate solutions;Assistance required to implement solutions  Insights: Decreased awareness of deficits  Initiation: Requires cues for some  Sequencing: Requires cues for some  Orientation  Overall Orientation Status: Within Functional

## 2024-05-09 NOTE — PLAN OF CARE
Problem: Chronic Conditions and Co-morbidities  Goal: Patient's chronic conditions and co-morbidity symptoms are monitored and maintained or improved  Outcome: Progressing  Flowsheets (Taken 5/9/2024 1543)  Care Plan - Patient's Chronic Conditions and Co-Morbidity Symptoms are Monitored and Maintained or Improved:   Monitor and assess patient's chronic conditions and comorbid symptoms for stability, deterioration, or improvement   Collaborate with multidisciplinary team to address chronic and comorbid conditions and prevent exacerbation or deterioration   Update acute care plan with appropriate goals if chronic or comorbid symptoms are exacerbated and prevent overall improvement and discharge     Problem: Pain  Goal: Verbalizes/displays adequate comfort level or baseline comfort level  Outcome: Progressing  Flowsheets (Taken 5/9/2024 1543)  Verbalizes/displays adequate comfort level or baseline comfort level:   Encourage patient to monitor pain and request assistance   Assess pain using appropriate pain scale   Administer analgesics based on type and severity of pain and evaluate response     Problem: Safety - Adult  Goal: Free from fall injury  Outcome: Progressing  Flowsheets (Taken 5/9/2024 1543)  Free From Fall Injury:   Instruct family/caregiver on patient safety   Based on caregiver fall risk screen, instruct family/caregiver to ask for assistance with transferring infant if caregiver noted to have fall risk factors     Problem: ABCDS Injury Assessment  Goal: Absence of physical injury  Outcome: Progressing  Flowsheets (Taken 5/9/2024 1543)  Absence of Physical Injury: Implement safety measures based on patient assessment     Problem: Skin/Tissue Integrity  Goal: Absence of new skin breakdown  Description: 1.  Monitor for areas of redness and/or skin breakdown  2.  Assess vascular access sites hourly  3.  Every 4-6 hours minimum:  Change oxygen saturation probe site  4.  Every 4-6 hours:  If on nasal  continuous positive airway pressure, respiratory therapy assess nares and determine need for appliance change or resting period.  Outcome: Progressing     Problem: Nutrition Deficit:  Goal: Optimize nutritional status  Outcome: Progressing  Flowsheets  Taken 5/9/2024 1543 by Latonya Young RN  Nutrient intake appropriate for improving, restoring, or maintaining nutritional needs: Assess nutritional status and recommend course of action  Taken 5/9/2024 1410 by Alesia Santos, MS, RD, LD  Nutrient intake appropriate for improving, restoring, or maintaining nutritional needs:   Assess nutritional status and recommend course of action   Monitor oral intake, labs, and treatment plans   Recommend appropriate diets, oral nutritional supplements, and vitamin/mineral supplements

## 2024-05-09 NOTE — PROGRESS NOTES
Treatment time: 3.5 hours  Net UF: 4000 ml     Pre weight: 99.0 kg  Post weight:95.0 kg  EDW: 93 kg  Crit Line Used: YES Ending Profile: A  Refill Present: NO    Access used: LTDC    Access function: well with  ml/min     Medications or blood products given: RETACRIT, HEPARIN DWELLS, NEOSPORIN TO EXIT SITE     Regular outpatient schedule: TTS     Summary of response to treatment: Patient tolerated treatment well and without any complications. Patient remained stable throughout entire treatment and upon the exiting the dialysis suite via transport.     Report given to NAVJOT CHANG and copy of dialysis treatment record placed in chart, to be scanned into EMR.

## 2024-05-09 NOTE — PROGRESS NOTES
[facility]  Nephrology f/u Note           Reason for Consult:  ESRD   Requesting Physician:  Dr. Spencer    Sub/interval history  The patient was seen and examined; he feels well today with no CP,  nausea or vomiting.  Has baseline shortness of breath.  On nasal cannula.  Last HD on 5/8/2024.  UF of 4.6 L.  Postweight 99 kg    ROS: No fever or chills.  Social: No family at bedside.    Scheduled Meds:   heparin (porcine)        vancomycin  500 mg IntraVENous Once    mupirocin   Topical BID    ipratropium 0.5 mg-albuterol 2.5 mg  1 Dose Inhalation BID RT    neomycin-bacitracin-polymyxin   Topical Once per day on Tue Thu Sat    apixaban  5 mg Oral BID    busPIRone  10 mg Oral TID    DULoxetine  60 mg Oral Daily    insulin glargine  15 Units SubCUTAneous Nightly    metoprolol succinate  25 mg Oral Daily    pravastatin  40 mg Oral Nightly    QUEtiapine  50 mg Oral Nightly    traZODone  50 mg Oral Daily    sodium chloride flush  5-40 mL IntraVENous 2 times per day    insulin lispro  0-4 Units SubCUTAneous TID WC    insulin lispro  0-4 Units SubCUTAneous Nightly    sacubitril-valsartan  1 tablet Oral BID    vancomycin (VANCOCIN) intermittent dosing (placeholder)   Other RX Placeholder    epoetin siddharth-epbx  5,000 Units IntraVENous Once per day on Tue Thu Sat    cefepime  1,000 mg IntraVENous Q24H     Continuous Infusions:   sodium chloride 20 mL/hr at 05/07/24 1814    dextrose       PRN Meds:.heparin (porcine), hydrOXYzine HCl, HYDROmorphone, hydrALAZINE, heparin (porcine), oxyCODONE, sodium chloride flush, sodium chloride, ondansetron **OR** ondansetron, polyethylene glycol, acetaminophen **OR** acetaminophen, glucose, dextrose bolus **OR** dextrose bolus, glucagon (rDNA), dextrose    History of Present Illness on 5/2/24:    54 y.o. yo male with PMH of  ESRD, CAD, CHF, COPD, HTN, DM, CVA and HTN  who is admitted after right foot I n d and graft placement  He underwent HD at his unit in [place] where he is  currently for 2h with 2.3l net UF, post wt was 97.7 kg     Physical exam:   Constitutional:  VITALS:  /67   Pulse 84   Temp 98.1 °F (36.7 °C)   Resp 18   Ht 1.753 m (5' 9\")   Wt 99 kg (218 lb 4.1 oz)   SpO2 96%   BMI 32.23 kg/m²     Gen: alert, awake  Neck: No JVD  Skin: Unremarkable  Cardiovascular:  S1, S2 without m/r/g   Respiratory: CTA B without w/r/r; respiratory effort normal  Abdomen:  soft, nt, nd,   Extremities: + lower extremity edema; right leg wrapped w ace wrap  Neuro/Psy: AAoriented times 3 ; moves all 4 ext  Access: left IJ TDC    Data/  Recent Labs     05/07/24  0804 05/08/24  0629 05/09/24  0507   WBC 8.9 7.3 7.6   HGB 7.8* 7.6* 8.1*   HCT 25.7* 23.8* 25.4*   MCV 87.2 85.7 85.6    225 234       Recent Labs     05/07/24  0804 05/08/24  0629 05/09/24  0507   * 128* 130*   K 5.2* 4.1 4.3   CL 90* 91* 93*   CO2 22 25 23   GLUCOSE 141* 180* 96   BUN 81* 50* 31*   CREATININE 8.4* 5.4* 4.0*   LABGLOM 7* 12* 17*         Assessment  -ESRD on HD TTS at [facility]   OP TW 95 kg   Weight up to 99 kg on 5/4/2024.    -Gangrene Right foot s/p I n D, metatarsal bone resection w integra graft on 5/2/24    -Anemia    -Hyponatremia    -HTN    -CKD/MBD    Plan  -HD per TTS schedule   -s/p Additional HD session this week to help with volume and hyponatremia  -Lokelma on nondialysis days.      -renal diet w FR 1l/day    -retacrit 5K with HD     -renal dose meds  -Exit site infection.  Redness crusting seen.  Started topical antibiotics.  Already on IV antibiotics.  ID on board.  Blood cultures from 5/2/2024 have been negative so far.    -Continue to monitor exit site on topical and iv abx, if no improvement in 10-14 days, will need new site for TDC ( e has had trouble getting access sites in past)    -Antibiotics with dialysis till 6/13/2024.  Vancomycin 750 mg, cefepime 2 g after HD      Seen and examined on HD      Belinda Golden MD  Office: [phone]  Fax:    [phone]

## 2024-05-09 NOTE — PROGRESS NOTES
Comprehensive Nutrition Assessment    Type and Reason for Visit:  Reassess    Nutrition Recommendations/Plan:   Continue NIDA, renal diet and add CC5 restriction   Encourage PO intake   Continue nepro BID   Monitor nutrition adequacy, pertinent labs, bowel habits, wt changes, and clinical progress     Malnutrition Assessment:  Malnutrition Status:  At risk for malnutrition (Comment) (05/09/24 9865)    Context:  Acute Illness     Findings of the 6 clinical characteristics of malnutrition:  Energy Intake:  Mild decrease in energy intake (Comment)  Fluid Accumulation:  Mild      Nutrition Assessment:    Follow up: HD today. Pt continues on NIDA, low potassium diet w/ 1000 ml FR. PO intakes 0%, 1-25%, 51-76% and % of meals in EMR. Pt unavailable at time of visit. No significant wt loss in EMR. Nepro added BID, 1 intake of % recorded. Continue to encourage PO intake, will continue to monitor.    Nutrition Related Findings:    BLE trace edema. BG  x 24 hours. No BM. On 5 L/min NC. Wound Type: Surgical Incision, Diabetic Ulcer       Current Nutrition Intake & Therapies:    Average Meal Intake: 1-25%, %  Average Supplements Intake: %  ADULT DIET; Regular; No Added Salt (3-4 gm); Low Potassium (Less than 3000 mg/day); 1000 ml  ADULT ORAL NUTRITION SUPPLEMENT; Breakfast, Dinner; Renal Oral Supplement    Anthropometric Measures:  Height: 175.3 cm (5' 9\")  Ideal Body Weight (IBW): 160 lbs (73 kg)       Current Body Weight: 98.9 kg (218 lb), 135.6 % IBW. Weight Source: Bed Scale  Current BMI (kg/m2): 32.2        Weight Adjustment For: No Adjustment                 BMI Categories: Obese Class 1 (BMI 30.0-34.9)    Estimated Daily Nutrient Needs:  Energy Requirements Based On: Kcal/kg (25-30 kcals/kg)  Weight Used for Energy Requirements: Ideal (73 kg)  Energy (kcal/day): 4102-3462  Weight Used for Protein Requirements: Ideal (1-1.2 g/kg)  Protein (g/day): 73-88 g  Method Used for Fluid Requirements: 1  ml/kcal  Fluid (ml/day):      Nutrition Diagnosis:   Inadequate oral intake related to inadequate protein-energy intake as evidenced by poor intake prior to admission, weight loss, intake 0-25%, intake 51-75%    Nutrition Interventions:   Food and/or Nutrient Delivery: Continue Current Diet, Continue Oral Nutrition Supplement  Nutrition Education/Counseling: Education needed  Coordination of Nutrition Care: Continue to monitor while inpatient       Goals:  Previous Goal Met: No Progress toward Goal(s)  Goals: PO intake 50% or greater, prior to discharge       Nutrition Monitoring and Evaluation:   Behavioral-Environmental Outcomes: None Identified  Food/Nutrient Intake Outcomes: Food and Nutrient Intake, Supplement Intake  Physical Signs/Symptoms Outcomes: Biochemical Data, Weight, Nutrition Focused Physical Findings    Discharge Planning:    Continue Oral Nutrition Supplement, Continue current diet     Alesia Santos, MS, RD, LD  Contact: Office: [phone]; [place]: [phone]

## 2024-05-09 NOTE — PROGRESS NOTES
Patient off unit for dialysis since start of shift, will complete care when patient returns to unit.

## 2024-05-09 NOTE — PLAN OF CARE

## 2024-05-09 NOTE — PROGRESS NOTES
[place]2.[place]    [facility] [place]      Name:  Igor Ann /Age/Sex: 1970  (54 y.o. male)   MRN & CSN:  [id number] & [id number] Encounter Date/Time: 2024 11:26 AM EDT   Location:  0538/0538-01 PCP: Vonnie Cleveland APRN - NP     Attending:Akash Lo MD       Hospital Day: 8    Assessment and Recommendations   Igor Ann is a 54 y.o. male with pmh of  who presents with Right foot ulcer, with necrosis of bone (HCC)      Plan:     Right foot wound S/P I&D below fascia of right foot, metatarsal bone resection right foot, and integra graft application on 24 with Dr. Berg.  Patient recently admitted and seen by podiatry for the same issue.  Was on IV antibiotics at that time.  Completed course of oral.  Margins at that time were good.  Admitted today for further surgical debridement.  Podiatry consulted  Cardiology was consulted for surgical clearance, but patient went to the OR prior to being seen.  Infectious disease consulted, awaiting surgical pathology and cultures. ID recommends continued IV abx for chronic OM attempting limb salvage - best option would be vanc and cefepime at HD - 6 weeks dating from last I&D - planned end date 24 - they will confer with Nephrology   IV Abx: Vanco + Cefepime both to be given after HD per Nephrology  Surgical culture Staph epi  Blood cultures NGTD after 4 days     Chronic respiratory failure/COPD not in exacerbation.  Continue supplemental oxygen  Continue breathing treatment     CAD  Continue Eliquis, statin, and metoprolol     Systolic CHF HFrEF, not in exacerbation  Last ECHO 24  Left ventricular systolic function is reduced with a visually estimated   ejection fraction of 35-45%. The left ventricle is normal in size with mild concentric hypertrophy. There is hypokinesis of the inferior wall. Cannot exclude further regional wall motion abnormalities secondary to poor endocardial visualization indeterminate diastolic

## 2024-05-09 NOTE — PROGRESS NOTES
Strengthening;Balance training;Functional mobility training;Transfer training;Endurance training;Gait training;Neuromuscular re-education;Stair training;Home exercise program;Safety education & training;Patient/Caregiver education & training;Equipment evaluation, education, & procurement;Therapeutic activities;Co-Treatment     Restrictions  Restrictions/Precautions  Restrictions/Precautions: General Precautions, Weight Bearing  Lower Extremity Weight Bearing Restrictions  Right Lower Extremity Weight Bearing: Non Weight Bearing  Position Activity Restriction  Other position/activity restrictions: up as tolerated, telemetry, NWB RLE     Subjective    Subjective  Subjective: Pt was witnessed ambulating to the bathroom w/ hospital staff w/ uncertain compliance with WB restriction.  Pt agreed to therapy session at this point.  Pain: c/o RLE and back pain, unrated  Orientation  Overall Orientation Status: Within Functional Limits  Orientation Level: Disoriented to situation;Oriented to place;Oriented to person;Oriented to time  Cognition  Overall Cognitive Status: Exceptions  Arousal/Alertness: Appears intact  Following Commands: Inconsistently follows commands  Attention Span: Appears intact  Memory: Appears intact  Safety Judgement: Impaired;Decreased awareness of need for assistance;Decreased awareness of need for safety  Problem Solving: Impaired;Assistance required to identify errors made;Assistance required to correct errors made;Decreased awareness of errors;Assistance required to generate solutions;Assistance required to implement solutions  Insights: Decreased awareness of deficits  Initiation: Requires cues for some  Sequencing: Requires cues for some     Objective   Vitals     Bed Mobility Training  Bed Mobility Training: No (seated EOB at EOS)  Balance  Sitting: Intact  Standing: Impaired  Standing - Static: Constant support;Fair  Standing - Dynamic: Constant support;Fair  Transfer Training  Transfer  Training: Yes  Interventions: Safety awareness training;Verbal cues;Tactile cues;Manual cues;Weight shifting training/pressure relief;Demonstration;Visual cues  Sit to Stand: Moderate assistance;Assist X2;Adaptive equipment (c STEDY from toilet, pt full weight bearing on RLE despite max education/ cues for safety and precautions)  Stand to Sit: Moderate assistance;Assist X2;Adaptive equipment (c STEDY)  Toilet Transfer: Total assistance;Assist X2;Adaptive equipment (pt initially transfer to [facility] CGA c RW but pt unsafe w/ poor compliance to RLE WB precautions, used STEDY to stand mod x2 from commode)     PT Exercises  Exercise Treatment: AP, LAQ, GS, Hip Abd 1X10 each in seated.     Safety Devices  Type of Devices: Call light within reach;Gait belt;Patient at risk for falls;Nurse notified;Left in bed;Bed alarm in place  Restraints  Restraints Initially in Place: No       Goals  Short Term Goals  Time Frame for Short Term Goals: 7 days (5/12) unless otherwise noted.  Short Term Goal 1: Pt will demonstrate IND with bed mobility.  -5/09 NT  Short Term Goal 2: pt will participate in trasnfer assessment and training and set goal when appropriate.  -5/09 mod A x2 rw  Short Term Goal 3: pt will participate in gait assessment and training and set goal when appropriate.  -5/09 not met  Short Term Goal 4: Pt will participate in 4 different BLE exercises of 12-15 reps each to improve strength and endurance by 5/10.  -5/09 on-going  Patient Goals   Patient Goals : \"I want to feel better.\"    Education  Patient Education  Education Given To: Patient  Education Provided: Role of Therapy;Plan of Care;Home Exercise Program  Education Provided Comments: educated pt on NWB status of RLE.  Education Method: Demonstration;Verbal;Printed Information/Hand-outs  Barriers to Learning: None  Education Outcome: Verbalized understanding;Continued education needed    AM-PAC - Mobility    AM-PAC Basic Mobility - Inpatient   How much help is  needed turning from your back to your side while in a flat bed without using bedrails?: None  How much help is needed moving from lying on your back to sitting on the side of a flat bed without using bedrails?: None  How much help is needed moving to and from a bed to a chair?: A Lot  How much help is needed standing up from a chair using your arms?: A Little  How much help is needed walking in hospital room?: A Lot  How much help is needed climbing 3-5 steps with a railing?: Total  AM-PAC Inpatient Mobility Raw Score : 16  AM-PAC Inpatient T-Scale Score : 40.78  Mobility Inpatient CMS 0-100% Score: 54.16  Mobility Inpatient CMS G-Code Modifier : CK         Therapy Time   Individual Concurrent Group Co-treatment   Time In       1428   Time Out       1501   Minutes       33   Timed Code Treatment Minutes: 33 Minutes       Wilmer Hurst

## 2024-05-10 VITALS
SYSTOLIC BLOOD PRESSURE: 124 MMHG | HEART RATE: 80 BPM | OXYGEN SATURATION: 98 % | HEIGHT: 69 IN | BODY MASS INDEX: 32.33 KG/M2 | DIASTOLIC BLOOD PRESSURE: 79 MMHG | RESPIRATION RATE: 16 BRPM | WEIGHT: 218.26 LBS | TEMPERATURE: 98.1 F

## 2024-05-10 LAB
EKG ATRIAL RATE: 80 BPM
EKG DIAGNOSIS: NORMAL
EKG P AXIS: 41 DEGREES
EKG P-R INTERVAL: 168 MS
EKG Q-T INTERVAL: 394 MS
EKG QRS DURATION: 96 MS
EKG QTC CALCULATION (BAZETT): 454 MS
EKG R AXIS: 27 DEGREES
EKG T AXIS: 189 DEGREES
EKG VENTRICULAR RATE: 80 BPM
GLUCOSE BLD-MCNC: 124 MG/DL (ref 70–99)
GLUCOSE BLD-MCNC: 194 MG/DL (ref 70–99)
PERFORMED ON: ABNORMAL
PERFORMED ON: ABNORMAL

## 2024-05-10 PROCEDURE — 2580000003 HC RX 258: Performed by: STUDENT IN AN ORGANIZED HEALTH CARE EDUCATION/TRAINING PROGRAM

## 2024-05-10 PROCEDURE — 6370000000 HC RX 637 (ALT 250 FOR IP): Performed by: NURSE PRACTITIONER

## 2024-05-10 PROCEDURE — 1200000000 HC SEMI PRIVATE

## 2024-05-10 PROCEDURE — 93005 ELECTROCARDIOGRAM TRACING: CPT | Performed by: NURSE PRACTITIONER

## 2024-05-10 PROCEDURE — 93010 ELECTROCARDIOGRAM REPORT: CPT | Performed by: INTERNAL MEDICINE

## 2024-05-10 PROCEDURE — 2700000000 HC OXYGEN THERAPY PER DAY

## 2024-05-10 PROCEDURE — 94761 N-INVAS EAR/PLS OXIMETRY MLT: CPT

## 2024-05-10 PROCEDURE — 6370000000 HC RX 637 (ALT 250 FOR IP): Performed by: STUDENT IN AN ORGANIZED HEALTH CARE EDUCATION/TRAINING PROGRAM

## 2024-05-10 PROCEDURE — 6370000000 HC RX 637 (ALT 250 FOR IP): Performed by: INTERNAL MEDICINE

## 2024-05-10 RX ORDER — OXYCODONE HYDROCHLORIDE 10 MG/1
10 TABLET ORAL EVERY 6 HOURS PRN
Qty: 12 TABLET | Refills: 0 | Status: SHIPPED | OUTPATIENT
Start: 2024-05-10 | End: 2024-05-13

## 2024-05-10 RX ORDER — IBUPROFEN 200 MG
TABLET ORAL
Qty: 28 G | Refills: 0 | Status: SHIPPED | OUTPATIENT
Start: 2024-05-11

## 2024-05-10 RX ORDER — CALCIUM CARBONATE 500 MG/1
500 TABLET, CHEWABLE ORAL 3 TIMES DAILY PRN
Status: DISCONTINUED | OUTPATIENT
Start: 2024-05-10 | End: 2024-05-10 | Stop reason: HOSPADM

## 2024-05-10 RX ORDER — CALCIUM CARBONATE 500 MG/1
500 TABLET, CHEWABLE ORAL 3 TIMES DAILY PRN
Qty: 90 TABLET | Refills: 0 | Status: SHIPPED | OUTPATIENT
Start: 2024-05-10 | End: 2024-06-09

## 2024-05-10 RX ADMIN — OXYCODONE 10 MG: 5 TABLET ORAL at 10:01

## 2024-05-10 RX ADMIN — SACUBITRIL AND VALSARTAN 1 TABLET: 24; 26 TABLET, FILM COATED ORAL at 09:58

## 2024-05-10 RX ADMIN — ONDANSETRON 4 MG: 4 TABLET, ORALLY DISINTEGRATING ORAL at 11:41

## 2024-05-10 RX ADMIN — BUSPIRONE HYDROCHLORIDE 10 MG: 5 TABLET ORAL at 09:58

## 2024-05-10 RX ADMIN — DULOXETINE HYDROCHLORIDE 60 MG: 60 CAPSULE, DELAYED RELEASE ORAL at 09:58

## 2024-05-10 RX ADMIN — APIXABAN 5 MG: 5 TABLET, FILM COATED ORAL at 09:58

## 2024-05-10 RX ADMIN — MUPIROCIN: 20 OINTMENT TOPICAL at 10:01

## 2024-05-10 RX ADMIN — CALCIUM CARBONATE 500 MG: 500 TABLET, CHEWABLE ORAL at 06:32

## 2024-05-10 RX ADMIN — METOPROLOL SUCCINATE 25 MG: 25 TABLET, FILM COATED, EXTENDED RELEASE ORAL at 09:58

## 2024-05-10 RX ADMIN — CALCIUM CARBONATE 500 MG: 500 TABLET, CHEWABLE ORAL at 02:48

## 2024-05-10 RX ADMIN — SODIUM CHLORIDE, PRESERVATIVE FREE 10 ML: 5 INJECTION INTRAVENOUS at 09:59

## 2024-05-10 ASSESSMENT — PAIN SCALES - GENERAL: PAINLEVEL_OUTOF10: 9

## 2024-05-10 NOTE — PROGRESS NOTES
Patient A&Ox4, VSS on 2L NC. Surgical dressing remains clean, dry, and intact. Pain managed with oral medications. Pt had emesis this am, x2, relieved with zofran. Pt anticipating discharge back to SNF this afternoon. Will continue to monitor for changes.

## 2024-05-10 NOTE — DISCHARGE INSTR - COC
Depression with anxiety F41.8    Community acquired pneumonia of left lower lobe of lung J18.9    Obesity E66.9    ZAINAB on CPAP G47.33    Degeneration of lumbar or lumbosacral intervertebral disc M51.37    Lumbar radiculopathy M54.16    Lumbosacral spondylosis without myelopathy M47.817    Biliary colic K80.50    Symptomatic cholelithiasis K80.20    Gastroparesis due to DM ([facility]) E11.43, K31.84    Angina, class IV ([facility]) I20.9    Dyspnea R06.00    Dyslipidemia E78.5    Chronic combined systolic and diastolic heart failure ([facility]) I50.42    Ischemic cardiomyopathy I25.5    Tobacco abuse Z72.0    CVA (cerebral vascular accident) ([facility]) I63.9    History of CVA (cerebrovascular accident) Z86.73    Type 2 diabetes mellitus without complication, without long-term current use of insulin ([facility]) E11.9    ZAINAB treated with BiPAP G47.33    Pleural effusion on left J90    Chronic anemia D64.9    Nonrheumatic aortic valve stenosis I35.0    Mucus plugging of bronchi T17.500A    Hemodialysis-associated hypotension I95.3    ESRD on dialysis ([facility]) N18.6, Z99.2    Hypotension due to drugs I95.2    Acute diastolic CHF (congestive heart failure) ([facility]) I50.31    Neuromuscular disorder ([facility]) G70.9    Renovascular hypertension I15.0    Mixed hyperlipidemia E78.2    Cigarette nicotine dependence in remission F17.211    Pulmonary edema J81.1    Fluid overload E87.70    Anemia of chronic disease D63.8    SOB (shortness of breath) on exertion R06.02    Steal syndrome of dialysis vascular access ([facility]) T82.898A    Chronic, continuous use of opioids F11.90    Chronic bronchitis ([facility]) J42    Nasal congestion R09.81    Hypercholesteremia E78.00    Bradycardia R00.1    S/P TAVR (transcatheter aortic valve replacement) Z95.2    Syncope and collapse R55    PAF (paroxysmal atrial fibrillation) ([facility]) I48.0    Bilateral leg weakness R29.898    GBS (Guillain-[place] syndrome) ([facility]) G61.0    Sinus pause I45.5    Weakness of both lower extremities R29.898    Sinus  D72.829    Hypertensive urgency I16.0    Severe sepsis ([facility]) A41.9, R65.20    Positive blood culture R78.81    Chest heaviness R07.89    Abnormal cardiovascular stress test R94.39    Ulcer with gangrene, with unspecified severity ([facility]) L98.499, I96    Symptomatic bradycardia R00.1    Pulmonary HTN ([facility]) I27.20    Aortic valve disease I35.9    Cardiogenic shock ([facility]) R57.0    Gangrene of right foot ([facility]) I96    Wound infection T14.8XXA, L08.9    ESRD (end stage renal disease) ([facility]) N18.6    Acute respiratory failure with hypoxia ([facility]) J96.01    Volume overload E87.70    Sepsis ([facility]) A41.9    Bimalleolar fracture of right ankle, closed, initial encounter S82.841A    High anion gap metabolic acidosis E87.29    Acute on chronic systolic CHF (congestive heart failure) ([facility]) I50.23    Anxiety F41.9    Acute on chronic respiratory failure ([facility]) J96.20    Non-compliance Z91.199    Diabetic ketoacidosis without coma associated with diabetes mellitus due to underlying condition ([facility]) E08.10    Abnormal chest x-ray R93.89    ZAINAB (obstructive sleep apnea) G47.33    Acute systolic CHF (congestive heart failure) ([facility]) I50.21    Foot ulcer with necrosis of bone, left ([facility]) L97.524    Right foot ulcer, with necrosis of bone ([facility]) L97.514    Foot infection L08.9       Isolation/Infection:   Isolation            Contact          Patient Infection Status       Infection Onset Added Last Indicated Last Indicated By Review Planned Expiration Resolved Resolved By    MRSA 05/26/23 05/28/23 04/04/24 Culture, Surgical                           Nurse Assessment:  Last Vital Signs: /79   Pulse 80   Temp 98.1 °F (36.7 °C) (Oral)   Resp 16   Ht 1.753 m (5' 9\")   Wt 99 kg (218 lb 4.1 oz)   SpO2 98%   BMI 32.23 kg/m²     Last documented pain score (0-10 scale): Pain Level: 9  Last Weight:   Wt Readings from Last 1 Encounters:   05/09/24 99 kg (218 lb 4.1 oz)     Mental Status:  oriented and alert    IV Access:  - Dialysis Catheter  -  Josie Barrios, JAY - CNP on 5/10/24 at 2:04 PM EDT

## 2024-05-10 NOTE — PROGRESS NOTES
Physical Therapy  Pt was actively throwing up upon room entrance; he stated he would decline PT session at this time.  Pt's PCA present and RN made aware. Will continue to follow.  Rigo Hurst PTA

## 2024-05-10 NOTE — PROGRESS NOTES
05/10/24 1529   Encounter Summary   Encounter Overview/Reason Follow-up   Service Provided For Patient   Referral/Consult From Other    Last Encounter  05/10/24  ( briefly visited with patient)   Complexity of Encounter Low   Begin Time 1435   End Time  1438   Total Time Calculated 3 min   Assessment/Intervention/Outcome   Assessment Unable to assess

## 2024-05-10 NOTE — PROGRESS NOTES
Report called to Sobeida, receiving nurse at the [facility]. Transport anticipated at 1600. Patient aware of transfer.

## 2024-05-10 NOTE — PROGRESS NOTES
[facility]  Nephrology f/u Note           Reason for Consult:  ESRD   Requesting Physician:  Dr. Spencer    Sub/interval history  The patient was seen and examined; he feels well today with no CP    Has baseline shortness of breath.  On nasal cannula.  Last HD on 5/9/2024.  UF of 4 L.  Postweight was 95 kg.    5/8/2024.  UF of 4.6 L.  Postweight 99 kg    Had some vomiting this morning    ROS: No fever or chills.  Social: No family at bedside.    Scheduled Meds:   mupirocin   Topical BID    ipratropium 0.5 mg-albuterol 2.5 mg  1 Dose Inhalation BID RT    neomycin-bacitracin-polymyxin   Topical Once per day on Tue Thu Sat    apixaban  5 mg Oral BID    busPIRone  10 mg Oral TID    DULoxetine  60 mg Oral Daily    insulin glargine  15 Units SubCUTAneous Nightly    metoprolol succinate  25 mg Oral Daily    pravastatin  40 mg Oral Nightly    QUEtiapine  50 mg Oral Nightly    traZODone  50 mg Oral Daily    sodium chloride flush  5-40 mL IntraVENous 2 times per day    insulin lispro  0-4 Units SubCUTAneous TID WC    insulin lispro  0-4 Units SubCUTAneous Nightly    sacubitril-valsartan  1 tablet Oral BID    vancomycin (VANCOCIN) intermittent dosing (placeholder)   Other RX Placeholder    epoetin siddharth-epbx  5,000 Units IntraVENous Once per day on Tue Thu Sat    cefepime  1,000 mg IntraVENous Q24H     Continuous Infusions:   sodium chloride 20 mL/hr at 05/07/24 1814    dextrose       PRN Meds:.calcium carbonate, hydrOXYzine HCl, HYDROmorphone, hydrALAZINE, heparin (porcine), oxyCODONE, sodium chloride flush, sodium chloride, ondansetron **OR** ondansetron, polyethylene glycol, acetaminophen **OR** acetaminophen, glucose, dextrose bolus **OR** dextrose bolus, glucagon (rDNA), dextrose    History of Present Illness on 5/2/24:    54 y.o. yo male with PMH of  ESRD, CAD, CHF, COPD, HTN, DM, CVA and HTN  who is admitted after right foot I n d and graft placement  He underwent HD at his unit in [place] where he is currently

## 2024-05-10 NOTE — DISCHARGE SUMMARY
V2.0  Discharge Summary    Name:  Igor Ann /Age/Sex: 1970 (54 y.o. male)   Admit Date: 2024  Discharge Date: 5/10/24    MRN & CSN:  [id number] & [id number] Encounter Date and Time 5/10/24 2:06 PM EDT    Attending:  Akash Lo MD Discharging Provider: JAY Contreras [facility] Course:     Brief HPI: Igor Ann is a 54 y.o. male who presented with CAD, cardiomyopathy, CHF with systolic dysfunction, hypertension, ESRD on HD, COPD, chronic respiratory failure and known foot ulcer who presents as a direct admit for surgical intervention with podiatry.  Patient is a high risk surgical candidate and admitted rather than have the procedure as an outpatient.  Aside from the wound on his foot patient does had not have any acute complaints today.  Denies chest pain or shortness of breath.     Brief Problem Based Course:   Right foot wound S/P I&D below fascia of right foot, metatarsal bone resection right foot, and integra graft application on 24 with Dr. Berg.  Patient recently admitted and seen by podiatry for the same issue.  Was on IV antibiotics at that time.  Completed course of oral.  Margins at that time were good.  Admitted today for further surgical debridement.  Podiatry consulted  Cardiology was consulted for surgical clearance, but patient went to the OR prior to being seen.  Infectious disease consulted, awaiting surgical pathology and cultures. ID recommends continued IV abx for chronic OM attempting limb salvage - best option would be vanc and cefepime at HD - 6 weeks dating from last I&D - planned end date 24 - they will confer with Nephrology   IV Abx: Vanco + Cefepime both to be given after HD per Nephrology  Surgical culture Staph epi  Blood cultures NGTD after 4 days     Chronic respiratory failure/COPD not in exacerbation.  Continue supplemental oxygen  Continue breathing treatment     CAD  Continue Eliquis, statin, and metoprolol    Growth after 4 days of incubation. 05/02/2024 05:16 PM     Organism:   Lab Results   Component Value Date/Time    ORG Staphylococcus epidermidis 05/02/2024 01:23 PM       Time Spent Discharging patient 40 minutes    Electronically signed by JAY Contreras CNP on 5/10/2024 at 2:06 PM

## 2024-05-10 NOTE — PROGRESS NOTES
Podiatric Surgery Daily Progress Note  Igor Ann  Subjective :   Patient seen and examined at the bedside. Reports some continued trouble breathing. Patient denies any acute overnight events although reports continued pain to the surgical site.       Objective     /79   Pulse 80   Temp 98.1 °F (36.7 °C) (Oral)   Resp 16   Ht 1.753 m (5' 9\")   Wt 99 kg (218 lb 4.1 oz)   SpO2 99%   BMI 32.23 kg/m²      I/O:  Intake/Output Summary (Last 24 hours) at 5/10/2024 0950  Last data filed at 5/9/2024 1944  Gross per 24 hour   Intake --   Output 25 ml   Net -25 ml                Wt Readings from Last 3 Encounters:   05/09/24 99 kg (218 lb 4.1 oz)   04/11/24 98.6 kg (217 lb 6 oz)   01/24/24 85.3 kg (188 lb)       LABS:    Recent Labs     05/08/24  0629 05/09/24  0507   WBC 7.3 7.6   HGB 7.6* 8.1*   HCT 23.8* 25.4*    234        Recent Labs     05/09/24  0507   *   K 4.3   CL 93*   CO2 23   BUN 31*   CREATININE 4.0*      No results for input(s): \"PROT\", \"INR\", \"APTT\" in the last 72 hours.        LOWER EXTREMITY EXAMINATION    Integument:  Skin is warm, dry and supple, bilateral. Integra graft intact and in place to right guillotine TMA site. No drainage, no SOI.    Neurologic:  Protective sensation is grossly diminished to light touch, bilateral.  Vascular:  DP and PT pulses are nonpalpable, bilateral.  Musculoskeletal:  Patient has 5/5 strength on inversion/everison/dorsiflexion/plantarflexion, bilateral.  No gross musculoskeletal deformities noted. Previous R TMA       Imaging: None required currently      Impression/Recommendations:    The patient is a pleasant 54 y.o. male with:  #Gangrene, right foot  #Ulcer, right midfoot with necrosis of muscle  #DMII  #PVD  #s/p R foot I&D with MT bone resection and Integra graft application (DOS 5/2/24)  - Pt continuing to do well postop, Integra graft intact w/ no SOI. MT bone C&S w/ + growth and path + for OM, ID on board w/ planned IV abx at HD. No  further podiatric surgical intervention warranted this admission. Pt to follow up w/ me OP upon DC, AVS updated.      Please call me with any questions.     Corrie Berg DPM  Office: [phone]  Cell: [phone]

## 2024-05-10 NOTE — PLAN OF CARE
Problem: Chronic Conditions and Co-morbidities  Goal: Patient's chronic conditions and co-morbidity symptoms are monitored and maintained or improved  5/9/2024 2208 by Danica Reaves RN  Outcome: Progressing  5/9/2024 1543 by Latonya Young RN  Outcome: Progressing  Flowsheets (Taken 5/9/2024 1543)  Care Plan - Patient's Chronic Conditions and Co-Morbidity Symptoms are Monitored and Maintained or Improved:   Monitor and assess patient's chronic conditions and comorbid symptoms for stability, deterioration, or improvement   Collaborate with multidisciplinary team to address chronic and comorbid conditions and prevent exacerbation or deterioration   Update acute care plan with appropriate goals if chronic or comorbid symptoms are exacerbated and prevent overall improvement and discharge     Problem: Discharge Planning  Goal: Discharge to home or other facility with appropriate resources  Outcome: Progressing     Problem: Pain  Goal: Verbalizes/displays adequate comfort level or baseline comfort level  5/9/2024 2208 by Danica Reaves RN  Outcome: Progressing  5/9/2024 1543 by Latonya Young RN  Outcome: Progressing  Flowsheets (Taken 5/9/2024 1543)  Verbalizes/displays adequate comfort level or baseline comfort level:   Encourage patient to monitor pain and request assistance   Assess pain using appropriate pain scale   Administer analgesics based on type and severity of pain and evaluate response     Problem: Safety - Adult  Goal: Free from fall injury  5/9/2024 2208 by Dainca Reaves RN  Outcome: Progressing  5/9/2024 1543 by Latonya Yonug RN  Outcome: Progressing  Flowsheets (Taken 5/9/2024 1543)  Free From Fall Injury:   Instruct family/caregiver on patient safety   Based on caregiver fall risk screen, instruct family/caregiver to ask for assistance with transferring infant if caregiver noted to have fall risk factors     Problem: ABCDS Injury Assessment  Goal: Absence of

## 2024-05-10 NOTE — CARE COORDINATION
CASE MANAGEMENT DISCHARGE SUMMARY      [facility]    Precertification completed: N/A  Hospital Exemption Notification (HENS) completed: N/A    IMM given: (date) 5/10/24    New Durable Medical Equipment ordered/agency: N/A    Transportation: [facility]      Medical Transport explained to pt/family. Pt/family voice no agency preference.    Agency used: [facility]   time: 1600   Ambulance form completed: Yes    Confirmed discharge plan with:     Patient: yes       Facility/Agency, name:  [facility]    Phone number for report to facility: [phone]     RN, name: Nevin    Note: Discharging nurse to complete CELINE, reconcile AVS, and place final copy with patient's discharge packet. RN to ensure that written prescriptions for  Level II medications are sent with patient to the facility as per protocol.      Ce Jung RN

## 2024-05-13 ENCOUNTER — TELEPHONE (OUTPATIENT)
Dept: INFECTIOUS DISEASES | Age: 54
End: 2024-05-13

## 2024-05-13 DIAGNOSIS — L97.524 FOOT ULCER WITH NECROSIS OF BONE, LEFT (HCC): Primary | ICD-10-CM

## 2024-05-13 LAB
FUNGUS SPEC CULT: NORMAL
LOEFFLER MB STN SPEC: NORMAL

## 2024-05-13 NOTE — TELEPHONE ENCOUNTER
Call placed to [facility] and spoke to Vonnie. Informed Vonnie of labs needed weekly as stated below.     Weekly CBC w/diff, LFTs, CRP faxed to us.    Vonnie verbalized understanding.     Vonnie states that dialysis is [facility] in house. If they are unable to draw labs then they will have them drawn.     Vonnie also states that patient is being non compliant with abx ointment that is ordered to be applied by podiatry.

## 2024-05-20 LAB
FUNGUS SPEC CULT: NORMAL
LOEFFLER MB STN SPEC: NORMAL

## 2024-05-21 ENCOUNTER — TELEPHONE (OUTPATIENT)
Dept: INFECTIOUS DISEASES | Age: 54
End: 2024-05-21

## 2024-05-21 LAB
A/G RATIO: 1.1
ALBUMIN SERPL-MCNC: 3.3 G/DL
ALP BLD-CCNC: 118 U/L
ALT SERPL-CCNC: 6 U/L
AST SERPL-CCNC: 9 U/L
BASOPHILS ABSOLUTE: 0.1 /ΜL
BASOPHILS RELATIVE PERCENT: 0.9 %
BILIRUB SERPL-MCNC: 0.3 MG/DL (ref 0.1–1.4)
BILIRUBIN DIRECT: 0.1 MG/DL
BILIRUBIN, INDIRECT: ABNORMAL
C-REACTIVE PROTEIN: 80.1
EOSINOPHILS ABSOLUTE: 0.4 /ΜL
EOSINOPHILS RELATIVE PERCENT: 5.6 %
GLOBULIN: ABNORMAL
HCT VFR BLD CALC: 26.2 % (ref 41–53)
HEMOGLOBIN: 8.3 G/DL (ref 13.5–17.5)
LYMPHOCYTES ABSOLUTE: 0.7 /ΜL
LYMPHOCYTES RELATIVE PERCENT: 8.7 %
MCH RBC QN AUTO: 26.6 PG
MCHC RBC AUTO-ENTMCNC: 31.7 G/DL
MCV RBC AUTO: 83.7 FL
MONOCYTES ABSOLUTE: 0.5 /ΜL
MONOCYTES RELATIVE PERCENT: 6.9 %
NEUTROPHILS ABSOLUTE: 6.1 /ΜL
NEUTROPHILS RELATIVE PERCENT: 77.9 %
PDW BLD-RTO: 17.4 %
PLATELET # BLD: 298 K/ΜL
PMV BLD AUTO: 7.7 FL
PROTEIN TOTAL: 6.2 G/DL
RBC # BLD: 3.13 10^6/ΜL
WBC # BLD: 7.8 10^3/ML

## 2024-05-21 NOTE — TELEPHONE ENCOUNTER
Called [facility] to inquiry about labs and for update.     OPAT Nurse Coordinator Weekly Update Note    Current OPAT plan: Receiving Vanc 750mg and Cefepime 2g with dialysis (3xs/week)- managed by Nephrology until 6/13      Diagnosis: Chronic OM right foot- attempting limb salvage      Assessment: Spoke with Dominique (unit manager) who states that patient is very non compliant. She states the patient refused 2 days of dialysis last week and does not comply with NWB status. Labs were dent out today and we should have results tomorrow.     Will continue to follow.       Follow up appts:  Scheduled with Dr Berg Thursday 5/23

## 2024-05-22 DIAGNOSIS — L97.524 FOOT ULCER WITH NECROSIS OF BONE, LEFT (HCC): ICD-10-CM

## 2024-05-23 ENCOUNTER — HOSPITAL ENCOUNTER (EMERGENCY)
Age: 54
Discharge: HOME | End: 2024-05-23
Attending: STUDENT IN AN ORGANIZED HEALTH CARE EDUCATION/TRAINING PROGRAM
Payer: COMMERCIAL

## 2024-05-23 ENCOUNTER — APPOINTMENT (OUTPATIENT)
Dept: GENERAL RADIOLOGY | Age: 54
End: 2024-05-23
Payer: COMMERCIAL

## 2024-05-23 ENCOUNTER — APPOINTMENT (OUTPATIENT)
Dept: CT IMAGING | Age: 54
End: 2024-05-23
Payer: COMMERCIAL

## 2024-05-23 VITALS
SYSTOLIC BLOOD PRESSURE: 156 MMHG | RESPIRATION RATE: 22 BRPM | HEART RATE: 80 BPM | OXYGEN SATURATION: 99 % | HEIGHT: 69 IN | TEMPERATURE: 98.3 F | DIASTOLIC BLOOD PRESSURE: 67 MMHG | BODY MASS INDEX: 32.23 KG/M2

## 2024-05-23 DIAGNOSIS — N30.00 ACUTE CYSTITIS WITHOUT HEMATURIA: Primary | ICD-10-CM

## 2024-05-23 LAB
ALBUMIN SERPL-MCNC: 3.4 G/DL (ref 3.4–5)
ALBUMIN/GLOB SERPL: 0.9 {RATIO} (ref 1.1–2.2)
ALP SERPL-CCNC: 139 U/L (ref 40–129)
ALT SERPL-CCNC: 8 U/L (ref 10–40)
AMMONIA PLAS-SCNC: 25 UMOL/L (ref 16–60)
ANION GAP SERPL CALCULATED.3IONS-SCNC: 9 MMOL/L (ref 3–16)
AST SERPL-CCNC: 14 U/L (ref 15–37)
BACTERIA URNS QL MICRO: ABNORMAL /HPF
BASE EXCESS BLDV CALC-SCNC: 8.1 MMOL/L (ref -3–3)
BASOPHILS # BLD: 0.1 K/UL (ref 0–0.2)
BASOPHILS NFR BLD: 0.9 %
BILIRUB SERPL-MCNC: <0.2 MG/DL (ref 0–1)
BILIRUB UR QL STRIP.AUTO: NEGATIVE
BUN SERPL-MCNC: 37 MG/DL (ref 7–20)
CALCIUM SERPL-MCNC: 9.4 MG/DL (ref 8.3–10.6)
CHLORIDE SERPL-SCNC: 90 MMOL/L (ref 99–110)
CLARITY UR: CLEAR
CO2 BLDV-SCNC: 36 MMOL/L
CO2 SERPL-SCNC: 35 MMOL/L (ref 21–32)
COHGB MFR BLDV: 2.7 % (ref 0–1.5)
COLOR UR: YELLOW
CREAT SERPL-MCNC: 4.7 MG/DL (ref 0.9–1.3)
DEPRECATED RDW RBC AUTO: 16.9 % (ref 12.4–15.4)
EOSINOPHIL # BLD: 0.4 K/UL (ref 0–0.6)
EOSINOPHIL NFR BLD: 5.7 %
EPI CELLS #/AREA URNS HPF: ABNORMAL /HPF (ref 0–5)
GFR SERPLBLD CREATININE-BSD FMLA CKD-EPI: 14 ML/MIN/{1.73_M2}
GLUCOSE SERPL-MCNC: 258 MG/DL (ref 70–99)
GLUCOSE UR STRIP.AUTO-MCNC: 500 MG/DL
HCO3 BLDV-SCNC: 34 MMOL/L (ref 23–29)
HCT VFR BLD AUTO: 26.1 % (ref 40.5–52.5)
HGB BLD-MCNC: 8.3 G/DL (ref 13.5–17.5)
HGB UR QL STRIP.AUTO: ABNORMAL
KETONES UR STRIP.AUTO-MCNC: NEGATIVE MG/DL
LEUKOCYTE ESTERASE UR QL STRIP.AUTO: ABNORMAL
LYMPHOCYTES # BLD: 0.4 K/UL (ref 1–5.1)
LYMPHOCYTES NFR BLD: 5.9 %
MCH RBC QN AUTO: 26.5 PG (ref 26–34)
MCHC RBC AUTO-ENTMCNC: 31.9 G/DL (ref 31–36)
MCV RBC AUTO: 83.3 FL (ref 80–100)
METHGB MFR BLDV: 0.3 %
MONOCYTES # BLD: 0.6 K/UL (ref 0–1.3)
MONOCYTES NFR BLD: 7.4 %
MUCOUS THREADS #/AREA URNS LPF: ABNORMAL /LPF
NEUTROPHILS # BLD: 6.1 K/UL (ref 1.7–7.7)
NEUTROPHILS NFR BLD: 80.1 %
NITRITE UR QL STRIP.AUTO: NEGATIVE
NT-PROBNP SERPL-MCNC: ABNORMAL PG/ML (ref 0–124)
O2 CT VFR BLDV CALC: 11 VOL %
O2 THERAPY: ABNORMAL
PCO2 BLDV: 55.4 MMHG (ref 40–50)
PH BLDV: 7.41 [PH] (ref 7.35–7.45)
PH UR STRIP.AUTO: 8 [PH] (ref 5–8)
PLATELET # BLD AUTO: 235 K/UL (ref 135–450)
PMV BLD AUTO: 7.6 FL (ref 5–10.5)
PO2 BLDV: 57 MMHG (ref 25–40)
POTASSIUM SERPL-SCNC: 4.2 MMOL/L (ref 3.5–5.1)
PROT SERPL-MCNC: 7 G/DL (ref 6.4–8.2)
PROT UR STRIP.AUTO-MCNC: >=300 MG/DL
RBC # BLD AUTO: 3.13 M/UL (ref 4.2–5.9)
RBC #/AREA URNS HPF: ABNORMAL /HPF (ref 0–4)
SAO2 % BLDV: 89 %
SODIUM SERPL-SCNC: 134 MMOL/L (ref 136–145)
SP GR UR STRIP.AUTO: 1.02 (ref 1–1.03)
TROPONIN, HIGH SENSITIVITY: 128 NG/L (ref 0–22)
TROPONIN, HIGH SENSITIVITY: 135 NG/L (ref 0–22)
UA DIPSTICK W REFLEX MICRO PNL UR: YES
URN SPEC COLLECT METH UR: ABNORMAL
UROBILINOGEN UR STRIP-ACNC: 0.2 E.U./DL
WBC # BLD AUTO: 7.7 K/UL (ref 4–11)
WBC #/AREA URNS HPF: ABNORMAL /HPF (ref 0–5)

## 2024-05-23 PROCEDURE — 2580000003 HC RX 258: Performed by: STUDENT IN AN ORGANIZED HEALTH CARE EDUCATION/TRAINING PROGRAM

## 2024-05-23 PROCEDURE — 80053 COMPREHEN METABOLIC PANEL: CPT

## 2024-05-23 PROCEDURE — 99285 EMERGENCY DEPT VISIT HI MDM: CPT

## 2024-05-23 PROCEDURE — 6370000000 HC RX 637 (ALT 250 FOR IP): Performed by: STUDENT IN AN ORGANIZED HEALTH CARE EDUCATION/TRAINING PROGRAM

## 2024-05-23 PROCEDURE — 96374 THER/PROPH/DIAG INJ IV PUSH: CPT

## 2024-05-23 PROCEDURE — 84484 ASSAY OF TROPONIN QUANT: CPT

## 2024-05-23 PROCEDURE — 82803 BLOOD GASES ANY COMBINATION: CPT

## 2024-05-23 PROCEDURE — 70450 CT HEAD/BRAIN W/O DYE: CPT

## 2024-05-23 PROCEDURE — 85025 COMPLETE CBC W/AUTO DIFF WBC: CPT

## 2024-05-23 PROCEDURE — 93005 ELECTROCARDIOGRAM TRACING: CPT | Performed by: STUDENT IN AN ORGANIZED HEALTH CARE EDUCATION/TRAINING PROGRAM

## 2024-05-23 PROCEDURE — 87086 URINE CULTURE/COLONY COUNT: CPT

## 2024-05-23 PROCEDURE — 83880 ASSAY OF NATRIURETIC PEPTIDE: CPT

## 2024-05-23 PROCEDURE — 36415 COLL VENOUS BLD VENIPUNCTURE: CPT

## 2024-05-23 PROCEDURE — 81001 URINALYSIS AUTO W/SCOPE: CPT

## 2024-05-23 PROCEDURE — 71045 X-RAY EXAM CHEST 1 VIEW: CPT

## 2024-05-23 PROCEDURE — 6360000002 HC RX W HCPCS: Performed by: STUDENT IN AN ORGANIZED HEALTH CARE EDUCATION/TRAINING PROGRAM

## 2024-05-23 PROCEDURE — 82140 ASSAY OF AMMONIA: CPT

## 2024-05-23 RX ORDER — OXYCODONE AND ACETAMINOPHEN 7.5; 325 MG/1; MG/1
1 TABLET ORAL ONCE
Status: COMPLETED | OUTPATIENT
Start: 2024-05-23 | End: 2024-05-23

## 2024-05-23 RX ORDER — IPRATROPIUM BROMIDE AND ALBUTEROL SULFATE 2.5; .5 MG/3ML; MG/3ML
1 SOLUTION RESPIRATORY (INHALATION) ONCE
Status: COMPLETED | OUTPATIENT
Start: 2024-05-23 | End: 2024-05-23

## 2024-05-23 RX ADMIN — WATER 125 MG: 1 INJECTION INTRAMUSCULAR; INTRAVENOUS; SUBCUTANEOUS at 20:18

## 2024-05-23 RX ADMIN — OXYCODONE HYDROCHLORIDE AND ACETAMINOPHEN 1 TABLET: 7.5; 325 TABLET ORAL at 19:56

## 2024-05-23 RX ADMIN — IPRATROPIUM BROMIDE AND ALBUTEROL SULFATE 1 DOSE: 2.5; .5 SOLUTION RESPIRATORY (INHALATION) at 20:18

## 2024-05-23 ASSESSMENT — PAIN SCALES - GENERAL: PAINLEVEL_OUTOF10: 10

## 2024-05-23 ASSESSMENT — PAIN - FUNCTIONAL ASSESSMENT: PAIN_FUNCTIONAL_ASSESSMENT: NONE - DENIES PAIN

## 2024-05-23 NOTE — ED PROVIDER NOTES
[facility] ED     EMERGENCY DEPARTMENT ENCOUNTER            Pt Name: Igor Ann   MRN: [id number]   Birthdate 1970   Date of evaluation: 5/23/2024   Provider: Dara Pizarro MD   PCP: Vonnie Cleveland APRN - NP   Note Started: 6:03 PM EDT 5/23/24          CHIEF COMPLAINT     Chief Complaint   Patient presents with    Altered Mental Status     [facility] called report stating that pt had some confusion around 1500 today. Pt also had a vagal episode @ 1530. Pt is HD and had incomplete treatment today d/t post op appt. For toe amputation.         HISTORY OF PRESENT ILLNESS:   History from : Patient   Limitations to history : None     Igor Ann is a 54 y.o. male who presents complaining of syncopal episode while he was on the toilet earlier today.  Patient resides at [facility] and reported that he has been confused today.  He did have recent toe amputation on his left foot.  Had his postop appointment earlier today and was sent back to nursing home.  He did not receive his entire dialysis session secondary to his postop appointment.  He complains of generalized chronic pain.  He was sent over because they were worried that he may be confused however he is disoriented to the year but otherwise completely oriented.  He denies other complaints or concerns.    Nursing Notes were all reviewed and agreed with, or any disagreements were addressed in the HPI.     REVIEW OF SYSTEMS :    Positives and Pertinent negatives as per HPI.      MEDICAL HISTORY   has a past medical history of Ambulatory dysfunction, Aortic stenosis, Arthritis, Asthma, Bilateral hilar adenopathy syndrome (06/03/2013), CAD (coronary artery disease), Cardiomyopathy (HCC) (04/19/2019), CHF (congestive heart failure) ([facility]), Chronic pain, COPD (chronic obstructive pulmonary disease) ([facility]), CVA (cerebral vascular accident) ([facility]) (05/21/2017), Depression, Diabetes mellitus ([facility]), Difficult intravenous access, Emphysema of lung

## 2024-05-24 ENCOUNTER — APPOINTMENT (OUTPATIENT)
Dept: GENERAL RADIOLOGY | Age: 54
End: 2024-05-24
Payer: MEDICARE

## 2024-05-24 ENCOUNTER — HOSPITAL ENCOUNTER (EMERGENCY)
Age: 54
Discharge: HOME | End: 2024-05-24
Attending: EMERGENCY MEDICINE
Payer: MEDICARE

## 2024-05-24 VITALS
BODY MASS INDEX: 32.88 KG/M2 | SYSTOLIC BLOOD PRESSURE: 171 MMHG | OXYGEN SATURATION: 100 % | HEART RATE: 87 BPM | RESPIRATION RATE: 18 BRPM | WEIGHT: 222 LBS | TEMPERATURE: 98.1 F | DIASTOLIC BLOOD PRESSURE: 74 MMHG | HEIGHT: 69 IN

## 2024-05-24 DIAGNOSIS — I50.23 ACUTE ON CHRONIC SYSTOLIC CONGESTIVE HEART FAILURE (HCC): Primary | ICD-10-CM

## 2024-05-24 LAB
ALBUMIN SERPL-MCNC: 3.6 G/DL (ref 3.4–5)
ALBUMIN/GLOB SERPL: 1 {RATIO} (ref 1.1–2.2)
ALP SERPL-CCNC: 146 U/L (ref 40–129)
ALT SERPL-CCNC: 7 U/L (ref 10–40)
ANION GAP SERPL CALCULATED.3IONS-SCNC: 12 MMOL/L (ref 3–16)
AST SERPL-CCNC: 13 U/L (ref 15–37)
BASE EXCESS BLDV CALC-SCNC: 4.5 MMOL/L (ref -3–3)
BASOPHILS # BLD: 0 K/UL (ref 0–0.2)
BASOPHILS NFR BLD: 0.1 %
BILIRUB SERPL-MCNC: 0.3 MG/DL (ref 0–1)
BUN SERPL-MCNC: 48 MG/DL (ref 7–20)
CALCIUM SERPL-MCNC: 9.8 MG/DL (ref 8.3–10.6)
CHLORIDE SERPL-SCNC: 87 MMOL/L (ref 99–110)
CO2 BLDV-SCNC: 33 MMOL/L
CO2 SERPL-SCNC: 31 MMOL/L (ref 21–32)
COHGB MFR BLDV: 3.7 % (ref 0–1.5)
CREAT SERPL-MCNC: 5.6 MG/DL (ref 0.9–1.3)
DEPRECATED RDW RBC AUTO: 17 % (ref 12.4–15.4)
EKG ATRIAL RATE: 82 BPM
EKG ATRIAL RATE: 93 BPM
EKG DIAGNOSIS: NORMAL
EKG DIAGNOSIS: NORMAL
EKG P AXIS: 107 DEGREES
EKG P-R INTERVAL: 166 MS
EKG P-R INTERVAL: 178 MS
EKG Q-T INTERVAL: 374 MS
EKG Q-T INTERVAL: 398 MS
EKG QRS DURATION: 84 MS
EKG QRS DURATION: 94 MS
EKG QTC CALCULATION (BAZETT): 464 MS
EKG QTC CALCULATION (BAZETT): 465 MS
EKG R AXIS: 2 DEGREES
EKG R AXIS: 92 DEGREES
EKG T AXIS: 19 DEGREES
EKG T AXIS: 81 DEGREES
EKG VENTRICULAR RATE: 82 BPM
EKG VENTRICULAR RATE: 93 BPM
EOSINOPHIL # BLD: 0 K/UL (ref 0–0.6)
EOSINOPHIL NFR BLD: 0.1 %
GFR SERPLBLD CREATININE-BSD FMLA CKD-EPI: 11 ML/MIN/{1.73_M2}
GLUCOSE SERPL-MCNC: 501 MG/DL (ref 70–99)
HCO3 BLDV-SCNC: 31.1 MMOL/L (ref 23–29)
HCT VFR BLD AUTO: 25.6 % (ref 40.5–52.5)
HGB BLD-MCNC: 8.1 G/DL (ref 13.5–17.5)
LYMPHOCYTES # BLD: 0.2 K/UL (ref 1–5.1)
LYMPHOCYTES NFR BLD: 3.3 %
MCH RBC QN AUTO: 26.6 PG (ref 26–34)
MCHC RBC AUTO-ENTMCNC: 31.7 G/DL (ref 31–36)
MCV RBC AUTO: 83.8 FL (ref 80–100)
METHGB MFR BLDV: 0.3 %
MONOCYTES # BLD: 0.1 K/UL (ref 0–1.3)
MONOCYTES NFR BLD: 3.1 %
NEUTROPHILS # BLD: 4.3 K/UL (ref 1.7–7.7)
NEUTROPHILS NFR BLD: 93.4 %
NT-PROBNP SERPL-MCNC: ABNORMAL PG/ML (ref 0–124)
O2 THERAPY: ABNORMAL
PCO2 BLDV: 58.5 MMHG (ref 40–50)
PH BLDV: 7.34 [PH] (ref 7.35–7.45)
PLATELET # BLD AUTO: 235 K/UL (ref 135–450)
PMV BLD AUTO: 7.8 FL (ref 5–10.5)
PO2 BLDV: 26.3 MMHG (ref 25–40)
POTASSIUM SERPL-SCNC: 4.6 MMOL/L (ref 3.5–5.1)
PROT SERPL-MCNC: 7.1 G/DL (ref 6.4–8.2)
RBC # BLD AUTO: 3.06 M/UL (ref 4.2–5.9)
SAO2 % BLDV: 44 %
SODIUM SERPL-SCNC: 130 MMOL/L (ref 136–145)
TROPONIN, HIGH SENSITIVITY: 104 NG/L (ref 0–22)
WBC # BLD AUTO: 4.6 K/UL (ref 4–11)

## 2024-05-24 PROCEDURE — 84484 ASSAY OF TROPONIN QUANT: CPT

## 2024-05-24 PROCEDURE — 93005 ELECTROCARDIOGRAM TRACING: CPT | Performed by: EMERGENCY MEDICINE

## 2024-05-24 PROCEDURE — 83880 ASSAY OF NATRIURETIC PEPTIDE: CPT

## 2024-05-24 PROCEDURE — 93010 ELECTROCARDIOGRAM REPORT: CPT | Performed by: INTERNAL MEDICINE

## 2024-05-24 PROCEDURE — 85025 COMPLETE CBC W/AUTO DIFF WBC: CPT

## 2024-05-24 PROCEDURE — 6360000002 HC RX W HCPCS: Performed by: EMERGENCY MEDICINE

## 2024-05-24 PROCEDURE — 99285 EMERGENCY DEPT VISIT HI MDM: CPT

## 2024-05-24 PROCEDURE — 82803 BLOOD GASES ANY COMBINATION: CPT

## 2024-05-24 PROCEDURE — 36415 COLL VENOUS BLD VENIPUNCTURE: CPT

## 2024-05-24 PROCEDURE — 96374 THER/PROPH/DIAG INJ IV PUSH: CPT

## 2024-05-24 PROCEDURE — 80053 COMPREHEN METABOLIC PANEL: CPT

## 2024-05-24 PROCEDURE — 71045 X-RAY EXAM CHEST 1 VIEW: CPT

## 2024-05-24 RX ORDER — FUROSEMIDE 10 MG/ML
60 INJECTION INTRAMUSCULAR; INTRAVENOUS ONCE
Status: COMPLETED | OUTPATIENT
Start: 2024-05-24 | End: 2024-05-24

## 2024-05-24 RX ADMIN — FUROSEMIDE 60 MG: 10 INJECTION, SOLUTION INTRAMUSCULAR; INTRAVENOUS at 10:48

## 2024-05-24 ASSESSMENT — PAIN DESCRIPTION - LOCATION: LOCATION: CHEST

## 2024-05-24 ASSESSMENT — PAIN - FUNCTIONAL ASSESSMENT
PAIN_FUNCTIONAL_ASSESSMENT: 0-10
PAIN_FUNCTIONAL_ASSESSMENT: NONE - DENIES PAIN

## 2024-05-24 ASSESSMENT — PAIN DESCRIPTION - PAIN TYPE: TYPE: ACUTE PAIN

## 2024-05-24 ASSESSMENT — PAIN SCALES - GENERAL: PAINLEVEL_OUTOF10: 6

## 2024-05-24 NOTE — ED PROVIDER NOTES
[facility] Emergency Department      CHIEF COMPLAINT  Chest Pain (Woke up with chest pain, couldn't breathe. Can't describe the pain, \"it just hurts.\" )      HISTORY OF PRESENT ILLNESS  Igor Ann is a 54 y.o. male with a history of *** .   No other complaints, modifying factors or associated symptoms.     History obtained from the patient.    I have reviewed the following from the nursing documentation.    Past Medical History:   Diagnosis Date    Ambulatory dysfunction     walker for long distances, SOB with distance    Aortic stenosis     echo 2017    Arthritis     hands and hips    Asthma     Bilateral hilar adenopathy syndrome 06/03/2013    CAD (coronary artery disease)     Dr. Javier Chanel Heart    Cardiomyopathy ([facility]) 04/19/2019    EF= 43%    CHF (congestive heart failure) ([facility])     Chronic pain     COPD (chronic obstructive pulmonary disease) ([facility])     pulmonology Dr. Batista    CVA (cerebral vascular accident) ([facility]) 05/21/2017    Depression     Diabetes mellitus ([facility])     borderline    Difficult intravenous access     Emphysema of lung ([facility])     ESRD (end stage renal disease) on dialysis ([facility])     MWF    Fear of needles     Gastric ulcer     GERD (gastroesophageal reflux disease)     Heart valve problem     bicuspic valve    Hemodialysis patient ([facility])     History of spinal fracture     work incident    Hx of blood clots     Bilateral lower extremities; stents in place    Hyperlipidemia     Hypertension     MI (myocardial infarction) ([facility]) 2019    has had 9 MIs. 2019 was the last    Neuromuscular disorder ([facility])     due to CVA    Numbness and tingling in left arm     from fistula    Pneumonia     PONV (postoperative nausea and vomiting)     Prolonged emergence from general anesthesia     States requires more medication than most people    Sleep apnea     Uses CPAP    Stroke ([facility])     7mm thalamic cva 2017 deficts left side, left side weakness    TIA (transient ischemic attack)      - 2.2    Total Bilirubin 0.3 0.0 - 1.0 mg/dL    Alkaline Phosphatase 146 (H) 40 - 129 U/L    ALT 7 (L) 10 - 40 U/L    AST 13 (L) 15 - 37 U/L   Brain Natriuretic Peptide   Result Value Ref Range    Pro-BNP >70,000 (H) 0 - 124 pg/mL   Blood Gas, Venous   Result Value Ref Range    pH, Blade 7.344 (L) 7.350 - 7.450    pCO2, Blade 58.5 (H) 40.0 - 50.0 mmHg    pO2, Blade 26.3 25.0 - 40.0 mmHg    HCO3, Venous 31.1 (H) 23.0 - 29.0 mmol/L    Base Excess, Blade 4.5 (H) -3.0 - 3.0 mmol/L    O2 Sat, Blade 44 Not Established %    Carboxyhemoglobin 3.7 (H) 0.0 - 1.5 %    MetHgb, Blade 0.3 <1.5 %    TC02 (Calc), Blade 33 Not Established mmol/L    O2 Therapy Unknown    Troponin   Result Value Ref Range    Troponin, High Sensitivity 104 (H) 0 - 22 ng/L   EKG 12 Lead   Result Value Ref Range    Ventricular Rate 93 BPM    Atrial Rate 93 BPM    P-R Interval 178 ms    QRS Duration 94 ms    Q-T Interval 374 ms    QTc Calculation (Bazett) 465 ms    P Axis 107 degrees    R Axis 2 degrees    T Axis 81 degrees    Diagnosis       Normal sinus rhythmNonspecific ST abnormalityAbnormal ECGWhen compared with ECG of 23-MAY-2024 19:06,No significant change was foundConfirmed by NANY BARCLAY MD ([id number]) on 5/24/2024 12:49:13 PM       EKG  The Ekg interpreted as interpreted by me:  normal sinus rhythm with a rate of 93  Axis is   Normal  QTc is   prolonged  Intervals and Durations are unremarkable.      No specific ST-T wave changes appreciated.  No evidence of acute ischemia.   No significant change from prior EKG dated May 23, 2024          RADIOLOGY  X-RAYS: ALL IMAGES INCLUDING PLAIN FILMS, CT, ULTRASOUND AND MRI HAVE BEEN READ BY THE RADIOLOGIST.   XR CHEST PORTABLE   Final Result   Bilateral pleuroparenchymal disease, similar to prior.                  I have personally reviewed Xray and Ultrasound images and radiology confirms the interpretation:  Chest x-ray with no pulmonary edema.        Medications administered. (Dose and Route):  Lasix 60 mg

## 2024-05-24 NOTE — PROGRESS NOTES
Report called to [facility] at this time. Patient transported back to SNF via ambulance without incident

## 2024-05-24 NOTE — ED NOTES
1158-Round trip orders placed for this patient at this time.   1215-Per Round trip patient transport scheduled with [facility].

## 2024-05-24 NOTE — DISCHARGE INSTRUCTIONS
Your lab work was all reassuring.  You were given a dose of IV Lasix in the ER.  You are on your baseline oxygen.

## 2024-05-25 LAB — BACTERIA UR CULT: NORMAL

## 2024-05-27 LAB
A/G RATIO: 1.2
ALBUMIN SERPL-MCNC: 3.1 G/DL
ALP BLD-CCNC: 112 U/L
ALT SERPL-CCNC: 7 U/L
AST SERPL-CCNC: 7 U/L
BASOPHILS ABSOLUTE: 0.1 /ΜL
BASOPHILS RELATIVE PERCENT: 1.2 %
BILIRUB SERPL-MCNC: 0.4 MG/DL (ref 0.1–1.4)
BILIRUBIN DIRECT: 0.1 MG/DL
BILIRUBIN, INDIRECT: ABNORMAL
C-REACTIVE PROTEIN: 21.8
EOSINOPHILS ABSOLUTE: 0.5 /ΜL
EOSINOPHILS RELATIVE PERCENT: 6.3 %
FUNGUS SPEC CULT: NORMAL
GLOBULIN: ABNORMAL
HCT VFR BLD CALC: 24 % (ref 41–53)
HEMOGLOBIN: 7.7 G/DL (ref 13.5–17.5)
LOEFFLER MB STN SPEC: NORMAL
LYMPHOCYTES ABSOLUTE: 0.8 /ΜL
LYMPHOCYTES RELATIVE PERCENT: 10.5 %
MCH RBC QN AUTO: 27.3 PG
MCHC RBC AUTO-ENTMCNC: 32.2 G/DL
MCV RBC AUTO: 84.9 FL
MONOCYTES ABSOLUTE: 0.6 /ΜL
MONOCYTES RELATIVE PERCENT: 8 %
NEUTROPHILS ABSOLUTE: 5.6 /ΜL
NEUTROPHILS RELATIVE PERCENT: 74 %
PDW BLD-RTO: 16.6 %
PLATELET # BLD: 296 K/ΜL
PMV BLD AUTO: 8.1 FL
PROTEIN TOTAL: 5.7 G/DL
RBC # BLD: 2.83 10^6/ΜL
WBC # BLD: 7.5 10^3/ML

## 2024-05-30 ENCOUNTER — TELEPHONE (OUTPATIENT)
Dept: INFECTIOUS DISEASES | Age: 54
End: 2024-05-30

## 2024-05-30 DIAGNOSIS — L97.524 FOOT ULCER WITH NECROSIS OF BONE, LEFT (HCC): ICD-10-CM

## 2024-05-30 NOTE — TELEPHONE ENCOUNTER
OPAT Nurse Coordinator Weekly Update Note    Current OPAT plan: Receiving Vanc 750mg and Cefepime 2g with dialysis (3xs/week)- managed by Nephrology until 6/13      Diagnosis: Chronic OM right foot- attempting limb salvage      Assessment: Called [facility] and spoke with Dominique. I asked for her to fax over this weeks labs and she verbalized understanding.     Dominique reports patient is still non compliant and is refusing dialysis on days but  goes other days. She reports that patient is weight bearing when he is not suppose to be. Per her report, foot looks good and dressings are to be changed at Dr Berg's office but again patient remains non compliant.       Follow up appts: Patient is due to see Dr Berg today.

## 2024-06-03 LAB
FUNGUS SPEC CULT: NORMAL
LOEFFLER MB STN SPEC: NORMAL

## 2024-06-06 ENCOUNTER — TELEPHONE (OUTPATIENT)
Dept: INFECTIOUS DISEASES | Age: 54
End: 2024-06-06

## 2024-06-06 NOTE — TELEPHONE ENCOUNTER
Call placed to [facility] and asked about lab results. Nurse stated she would look and fax results.

## 2024-06-12 ENCOUNTER — TELEPHONE (OUTPATIENT)
Dept: INFECTIOUS DISEASES | Age: 54
End: 2024-06-12

## 2024-06-12 NOTE — TELEPHONE ENCOUNTER
OPAT Nurse Coordinator Weekly Update Note    Current OPAT plan: Receiving Vanc 750mg and Cefepime 2g with dialysis (3xs/week)- managed by Nephrology until     Diagnosis: Chronic OM right foot- attempting limb salvage      Assessment: Spoke with Dominique nurse at [facility] She states patient continues to be non compliant, but otherwise ok. She does report some depression in patient and him saying things such as, \"I'd be better off if I .\"      Informed him that we are ending IV abx (that he receives with HD) as planned tomorrow.     Dominique verbalized understanding.       Follow up appts: Patient is followed by podiatry Dr Berg.       OPAT End  Call made to Demond giron at [facility] and spoke with charge NAVJOT Leslie.  Informed Mariama that we are ending abx as planned tomorrow. She verbalized understanding.     Patient only has a line that is used for dialysis. No line removal verification needed.

## 2024-06-12 NOTE — TELEPHONE ENCOUNTER
Detail Level: Simple Patient planned to end 6 weeks IV abx on 6/13 for R DFI with OM.   Patient has had poor compliance with HD and DPM instruction.    We have done all we can from an abx perspective with little buy in from the patient.     We will stop abx with HD as planned.  Continue to f/u with DPM.    Chelsea Becker, PharmD, [facility]  Clinical Pharmacy Specialist- [facility]ious [facility]  Phone: [phone] (also available on [employer])  Fax: [phone]    For Pharmacy Admin Tracking Only    Program: Medical Group  CPA in place: Yes  Intervention Detail: Discontinued Rx: 2, reason: Therapy Complete  Time Spent (min): 10

## 2024-07-03 ENCOUNTER — APPOINTMENT (OUTPATIENT)
Dept: GENERAL RADIOLOGY | Age: 54
DRG: 280 | End: 2024-07-03
Payer: MEDICARE

## 2024-07-03 ENCOUNTER — HOSPITAL ENCOUNTER (INPATIENT)
Age: 54
LOS: 6 days | Discharge: NURSING, REHAB OR LONG-TERM CARE | DRG: 280 | End: 2024-07-09
Attending: STUDENT IN AN ORGANIZED HEALTH CARE EDUCATION/TRAINING PROGRAM | Admitting: INTERNAL MEDICINE
Payer: MEDICARE

## 2024-07-03 DIAGNOSIS — R07.9 CHEST PAIN, UNSPECIFIED TYPE: Primary | ICD-10-CM

## 2024-07-03 DIAGNOSIS — I20.9 ANGINA PECTORIS (HCC): ICD-10-CM

## 2024-07-03 DIAGNOSIS — K92.1 MELENA: ICD-10-CM

## 2024-07-03 DIAGNOSIS — R79.89 ELEVATED TROPONIN: ICD-10-CM

## 2024-07-03 DIAGNOSIS — R94.31 T WAVE INVERSION ON ELECTROCARDIOGRAM: ICD-10-CM

## 2024-07-03 LAB
ALBUMIN SERPL-MCNC: 3.8 G/DL (ref 3.4–5)
ALBUMIN/GLOB SERPL: 1.1 {RATIO} (ref 1.1–2.2)
ALP SERPL-CCNC: 160 U/L (ref 40–129)
ALT SERPL-CCNC: 11 U/L (ref 10–40)
ANION GAP SERPL CALCULATED.3IONS-SCNC: 11 MMOL/L (ref 3–16)
AST SERPL-CCNC: 11 U/L (ref 15–37)
BASE EXCESS BLDV CALC-SCNC: 4.5 MMOL/L (ref -3–3)
BASOPHILS # BLD: 0.1 K/UL (ref 0–0.2)
BASOPHILS NFR BLD: 1 %
BILIRUB SERPL-MCNC: <0.2 MG/DL (ref 0–1)
BUN SERPL-MCNC: 57 MG/DL (ref 7–20)
CALCIUM SERPL-MCNC: 9.7 MG/DL (ref 8.3–10.6)
CHLORIDE SERPL-SCNC: 91 MMOL/L (ref 99–110)
CO2 BLDV-SCNC: 29 MMOL/L
CO2 SERPL-SCNC: 32 MMOL/L (ref 21–32)
COHGB MFR BLDV: 4.2 % (ref 0–1.5)
CREAT SERPL-MCNC: 6.2 MG/DL (ref 0.9–1.3)
DEPRECATED RDW RBC AUTO: 17.6 % (ref 12.4–15.4)
EKG ATRIAL RATE: 69 BPM
EKG DIAGNOSIS: NORMAL
EKG P AXIS: 84 DEGREES
EKG P-R INTERVAL: 174 MS
EKG Q-T INTERVAL: 406 MS
EKG QRS DURATION: 94 MS
EKG QTC CALCULATION (BAZETT): 435 MS
EKG R AXIS: 99 DEGREES
EKG T AXIS: -34 DEGREES
EKG VENTRICULAR RATE: 69 BPM
EOSINOPHIL # BLD: 0.3 K/UL (ref 0–0.6)
EOSINOPHIL NFR BLD: 5.5 %
FLUAV RNA RESP QL NAA+PROBE: NOT DETECTED
FLUBV RNA RESP QL NAA+PROBE: NOT DETECTED
GFR SERPLBLD CREATININE-BSD FMLA CKD-EPI: 10 ML/MIN/{1.73_M2}
GLUCOSE BLD-MCNC: 152 MG/DL (ref 70–99)
GLUCOSE SERPL-MCNC: 150 MG/DL (ref 70–99)
HCO3 BLDV-SCNC: 27.4 MMOL/L (ref 23–29)
HCT VFR BLD AUTO: 29.4 % (ref 40.5–52.5)
HGB BLD-MCNC: 9.2 G/DL (ref 13.5–17.5)
INR PPP: 1.4 (ref 0.85–1.15)
LYMPHOCYTES # BLD: 0.7 K/UL (ref 1–5.1)
LYMPHOCYTES NFR BLD: 10.9 %
MCH RBC QN AUTO: 26.6 PG (ref 26–34)
MCHC RBC AUTO-ENTMCNC: 31.4 G/DL (ref 31–36)
MCV RBC AUTO: 84.7 FL (ref 80–100)
METHGB MFR BLDV: 0.3 %
MONOCYTES # BLD: 0.6 K/UL (ref 0–1.3)
MONOCYTES NFR BLD: 9 %
NEUTROPHILS # BLD: 4.5 K/UL (ref 1.7–7.7)
NEUTROPHILS NFR BLD: 73.6 %
NT-PROBNP SERPL-MCNC: ABNORMAL PG/ML (ref 0–124)
O2 CT VFR BLDV CALC: 14 VOL %
O2 THERAPY: ABNORMAL
PCO2 BLDV: 34.4 MMHG (ref 40–50)
PERFORMED ON: ABNORMAL
PH BLDV: 7.52 [PH] (ref 7.35–7.45)
PLATELET # BLD AUTO: 281 K/UL (ref 135–450)
PMV BLD AUTO: 8.1 FL (ref 5–10.5)
PO2 BLDV: 98.7 MMHG (ref 25–40)
POTASSIUM SERPL-SCNC: 5.5 MMOL/L (ref 3.5–5.1)
PROT SERPL-MCNC: 7.2 G/DL (ref 6.4–8.2)
PROTHROMBIN TIME: 17.4 SEC (ref 11.9–14.9)
RBC # BLD AUTO: 3.47 M/UL (ref 4.2–5.9)
REASON FOR REJECTION: NORMAL
REASON FOR REJECTION: NORMAL
REJECTED TEST: NORMAL
REJECTED TEST: NORMAL
SAO2 % BLDV: 98 %
SARS-COV-2 RNA RESP QL NAA+PROBE: NOT DETECTED
SODIUM SERPL-SCNC: 134 MMOL/L (ref 136–145)
TROPONIN, HIGH SENSITIVITY: 112 NG/L (ref 0–22)
TROPONIN, HIGH SENSITIVITY: 113 NG/L (ref 0–22)
WBC # BLD AUTO: 6.1 K/UL (ref 4–11)

## 2024-07-03 PROCEDURE — 6370000000 HC RX 637 (ALT 250 FOR IP): Performed by: STUDENT IN AN ORGANIZED HEALTH CARE EDUCATION/TRAINING PROGRAM

## 2024-07-03 PROCEDURE — 85025 COMPLETE CBC W/AUTO DIFF WBC: CPT

## 2024-07-03 PROCEDURE — 2700000000 HC OXYGEN THERAPY PER DAY

## 2024-07-03 PROCEDURE — 84484 ASSAY OF TROPONIN QUANT: CPT

## 2024-07-03 PROCEDURE — 93010 ELECTROCARDIOGRAM REPORT: CPT | Performed by: INTERNAL MEDICINE

## 2024-07-03 PROCEDURE — 85610 PROTHROMBIN TIME: CPT

## 2024-07-03 PROCEDURE — 82803 BLOOD GASES ANY COMBINATION: CPT

## 2024-07-03 PROCEDURE — 36415 COLL VENOUS BLD VENIPUNCTURE: CPT

## 2024-07-03 PROCEDURE — 71045 X-RAY EXAM CHEST 1 VIEW: CPT

## 2024-07-03 PROCEDURE — 80053 COMPREHEN METABOLIC PANEL: CPT

## 2024-07-03 PROCEDURE — 6370000000 HC RX 637 (ALT 250 FOR IP): Performed by: INTERNAL MEDICINE

## 2024-07-03 PROCEDURE — 1200000000 HC SEMI PRIVATE

## 2024-07-03 PROCEDURE — 93005 ELECTROCARDIOGRAM TRACING: CPT | Performed by: STUDENT IN AN ORGANIZED HEALTH CARE EDUCATION/TRAINING PROGRAM

## 2024-07-03 PROCEDURE — 99285 EMERGENCY DEPT VISIT HI MDM: CPT

## 2024-07-03 PROCEDURE — 94761 N-INVAS EAR/PLS OXIMETRY MLT: CPT

## 2024-07-03 PROCEDURE — 87636 SARSCOV2 & INF A&B AMP PRB: CPT

## 2024-07-03 PROCEDURE — 83880 ASSAY OF NATRIURETIC PEPTIDE: CPT

## 2024-07-03 RX ORDER — SODIUM CHLORIDE 9 MG/ML
INJECTION, SOLUTION INTRAVENOUS PRN
Status: DISCONTINUED | OUTPATIENT
Start: 2024-07-03 | End: 2024-07-09 | Stop reason: HOSPADM

## 2024-07-03 RX ORDER — ATORVASTATIN CALCIUM 40 MG/1
40 TABLET, FILM COATED ORAL NIGHTLY
Status: DISCONTINUED | OUTPATIENT
Start: 2024-07-03 | End: 2024-07-03 | Stop reason: ALTCHOICE

## 2024-07-03 RX ORDER — SODIUM CHLORIDE 0.9 % (FLUSH) 0.9 %
5-40 SYRINGE (ML) INJECTION PRN
Status: DISCONTINUED | OUTPATIENT
Start: 2024-07-03 | End: 2024-07-09 | Stop reason: HOSPADM

## 2024-07-03 RX ORDER — ACETAMINOPHEN 325 MG/1
650 TABLET ORAL EVERY 6 HOURS PRN
Status: DISCONTINUED | OUTPATIENT
Start: 2024-07-03 | End: 2024-07-09 | Stop reason: HOSPADM

## 2024-07-03 RX ORDER — DULOXETIN HYDROCHLORIDE 60 MG/1
60 CAPSULE, DELAYED RELEASE ORAL DAILY
Status: DISCONTINUED | OUTPATIENT
Start: 2024-07-04 | End: 2024-07-09 | Stop reason: HOSPADM

## 2024-07-03 RX ORDER — TRAZODONE HYDROCHLORIDE 50 MG/1
50 TABLET ORAL NIGHTLY
Status: DISCONTINUED | OUTPATIENT
Start: 2024-07-03 | End: 2024-07-09 | Stop reason: HOSPADM

## 2024-07-03 RX ORDER — INSULIN GLARGINE 100 [IU]/ML
15 INJECTION, SOLUTION SUBCUTANEOUS NIGHTLY
Status: DISCONTINUED | OUTPATIENT
Start: 2024-07-03 | End: 2024-07-07

## 2024-07-03 RX ORDER — ACETAMINOPHEN 650 MG/1
650 SUPPOSITORY RECTAL EVERY 6 HOURS PRN
Status: DISCONTINUED | OUTPATIENT
Start: 2024-07-03 | End: 2024-07-09 | Stop reason: HOSPADM

## 2024-07-03 RX ORDER — OXYCODONE HYDROCHLORIDE AND ACETAMINOPHEN 5; 325 MG/1; MG/1
1 TABLET ORAL
Status: COMPLETED | OUTPATIENT
Start: 2024-07-03 | End: 2024-07-03

## 2024-07-03 RX ORDER — ONDANSETRON 2 MG/ML
4 INJECTION INTRAMUSCULAR; INTRAVENOUS EVERY 6 HOURS PRN
Status: DISCONTINUED | OUTPATIENT
Start: 2024-07-03 | End: 2024-07-09 | Stop reason: HOSPADM

## 2024-07-03 RX ORDER — OXYCODONE HYDROCHLORIDE 10 MG/1
10 TABLET ORAL EVERY 6 HOURS PRN
Status: ON HOLD | COMMUNITY

## 2024-07-03 RX ORDER — VENLAFAXINE HYDROCHLORIDE 75 MG/1
75 CAPSULE, EXTENDED RELEASE ORAL DAILY
Status: ON HOLD | COMMUNITY

## 2024-07-03 RX ORDER — POLYETHYLENE GLYCOL 3350 17 G/17G
17 POWDER, FOR SOLUTION ORAL DAILY PRN
Status: DISCONTINUED | OUTPATIENT
Start: 2024-07-03 | End: 2024-07-09

## 2024-07-03 RX ORDER — MIDODRINE HYDROCHLORIDE 10 MG/1
5 TABLET ORAL EVERY 6 HOURS PRN
Status: ON HOLD | COMMUNITY

## 2024-07-03 RX ORDER — PREGABALIN 25 MG/1
25 CAPSULE ORAL DAILY
Status: ON HOLD | COMMUNITY

## 2024-07-03 RX ORDER — IPRATROPIUM BROMIDE AND ALBUTEROL SULFATE 2.5; .5 MG/3ML; MG/3ML
1 SOLUTION RESPIRATORY (INHALATION) EVERY 6 HOURS PRN
Status: DISCONTINUED | OUTPATIENT
Start: 2024-07-03 | End: 2024-07-04

## 2024-07-03 RX ORDER — OXYCODONE HYDROCHLORIDE 5 MG/1
10 TABLET ORAL EVERY 6 HOURS PRN
Status: DISCONTINUED | OUTPATIENT
Start: 2024-07-03 | End: 2024-07-09 | Stop reason: HOSPADM

## 2024-07-03 RX ORDER — VENLAFAXINE HYDROCHLORIDE 75 MG/1
75 CAPSULE, EXTENDED RELEASE ORAL DAILY
Status: DISCONTINUED | OUTPATIENT
Start: 2024-07-03 | End: 2024-07-09 | Stop reason: HOSPADM

## 2024-07-03 RX ORDER — SODIUM CHLORIDE 0.9 % (FLUSH) 0.9 %
5-40 SYRINGE (ML) INJECTION EVERY 12 HOURS SCHEDULED
Status: DISCONTINUED | OUTPATIENT
Start: 2024-07-03 | End: 2024-07-09 | Stop reason: HOSPADM

## 2024-07-03 RX ORDER — BUSPIRONE HYDROCHLORIDE 7.5 MG/1
15 TABLET ORAL 3 TIMES DAILY
Status: DISCONTINUED | OUTPATIENT
Start: 2024-07-03 | End: 2024-07-09 | Stop reason: HOSPADM

## 2024-07-03 RX ORDER — ASPIRIN 81 MG/1
81 TABLET, CHEWABLE ORAL DAILY
Status: DISCONTINUED | OUTPATIENT
Start: 2024-07-04 | End: 2024-07-09 | Stop reason: HOSPADM

## 2024-07-03 RX ORDER — CALCIUM ACETATE 667 MG/1
2 CAPSULE ORAL
Status: DISCONTINUED | OUTPATIENT
Start: 2024-07-04 | End: 2024-07-09 | Stop reason: HOSPADM

## 2024-07-03 RX ORDER — LORAZEPAM 0.5 MG/1
0.5 TABLET ORAL EVERY 6 HOURS PRN
Status: ON HOLD | COMMUNITY

## 2024-07-03 RX ORDER — PREGABALIN 25 MG/1
25 CAPSULE ORAL DAILY
Status: DISCONTINUED | OUTPATIENT
Start: 2024-07-04 | End: 2024-07-04

## 2024-07-03 RX ORDER — METOPROLOL SUCCINATE 25 MG/1
25 TABLET, EXTENDED RELEASE ORAL DAILY
Status: DISCONTINUED | OUTPATIENT
Start: 2024-07-03 | End: 2024-07-09 | Stop reason: HOSPADM

## 2024-07-03 RX ORDER — QUETIAPINE FUMARATE 25 MG/1
50 TABLET, FILM COATED ORAL NIGHTLY
Status: DISCONTINUED | OUTPATIENT
Start: 2024-07-03 | End: 2024-07-09 | Stop reason: HOSPADM

## 2024-07-03 RX ORDER — ONDANSETRON 4 MG/1
4 TABLET, ORALLY DISINTEGRATING ORAL EVERY 8 HOURS PRN
Status: DISCONTINUED | OUTPATIENT
Start: 2024-07-03 | End: 2024-07-09 | Stop reason: HOSPADM

## 2024-07-03 RX ORDER — PRAVASTATIN SODIUM 40 MG
40 TABLET ORAL DAILY
Status: DISCONTINUED | OUTPATIENT
Start: 2024-07-04 | End: 2024-07-04

## 2024-07-03 RX ORDER — BUSPIRONE HYDROCHLORIDE 15 MG/1
15 TABLET ORAL DAILY
Status: ON HOLD | COMMUNITY
Start: 2024-06-04

## 2024-07-03 RX ORDER — HYDROCODONE BITARTRATE AND ACETAMINOPHEN 5; 325 MG/1; MG/1
1 TABLET ORAL
Status: COMPLETED | OUTPATIENT
Start: 2024-07-03 | End: 2024-07-03

## 2024-07-03 RX ADMIN — TRAZODONE HYDROCHLORIDE 50 MG: 50 TABLET ORAL at 23:35

## 2024-07-03 RX ADMIN — QUETIAPINE FUMARATE 50 MG: 25 TABLET ORAL at 23:35

## 2024-07-03 RX ADMIN — BUSPIRONE HYDROCHLORIDE 15 MG: 7.5 TABLET ORAL at 23:35

## 2024-07-03 RX ADMIN — INSULIN GLARGINE 15 UNITS: 100 INJECTION, SOLUTION SUBCUTANEOUS at 23:39

## 2024-07-03 RX ADMIN — OXYCODONE 10 MG: 5 TABLET ORAL at 23:35

## 2024-07-03 RX ADMIN — HYDROCODONE BITARTRATE AND ACETAMINOPHEN 1 TABLET: 5; 325 TABLET ORAL at 18:39

## 2024-07-03 RX ADMIN — OXYCODONE HYDROCHLORIDE AND ACETAMINOPHEN 1 TABLET: 5; 325 TABLET ORAL at 21:04

## 2024-07-03 RX ADMIN — METOPROLOL SUCCINATE 25 MG: 25 TABLET, EXTENDED RELEASE ORAL at 23:36

## 2024-07-03 RX ADMIN — APIXABAN 5 MG: 5 TABLET, FILM COATED ORAL at 23:35

## 2024-07-03 RX ADMIN — VENLAFAXINE HYDROCHLORIDE 75 MG: 75 CAPSULE, EXTENDED RELEASE ORAL at 23:36

## 2024-07-03 ASSESSMENT — PAIN DESCRIPTION - ORIENTATION
ORIENTATION: RIGHT
ORIENTATION: LEFT;RIGHT;LOWER

## 2024-07-03 ASSESSMENT — PAIN SCALES - GENERAL
PAINLEVEL_OUTOF10: 9
PAINLEVEL_OUTOF10: 8
PAINLEVEL_OUTOF10: 9
PAINLEVEL_OUTOF10: 8

## 2024-07-03 ASSESSMENT — PAIN DESCRIPTION - LOCATION
LOCATION: FOOT
LOCATION: HIP

## 2024-07-03 ASSESSMENT — PAIN DESCRIPTION - DESCRIPTORS: DESCRIPTORS: ACHING;SHOOTING

## 2024-07-03 ASSESSMENT — PAIN - FUNCTIONAL ASSESSMENT: PAIN_FUNCTIONAL_ASSESSMENT: 0-10

## 2024-07-03 NOTE — ED PROVIDER NOTES
[facility] ED      CHIEF COMPLAINT  Chest Pain (Pt to ED via EMS from [facility] s/p reports of chest pain \"discomfort\" and hypertension. Pt was given 1 nitro and 324mg ASA from nursing home. )       HISTORY OF PRESENT ILLNESS  Igor Ann is a 54 y.o. male  who presents to the ED complaining of chest pain that started this afternoon shortly prior to arrival.  Indicates the pain started in his left chest and rating to his left arm.  No new shortness of breath, nausea, vomiting associated with this.  Pain did improve with nitroglycerin given by EMS.  Currently pain-free.    No other complaints, modifying factors or associated symptoms.     I have reviewed the following from the nursing documentation.    Past Medical History:   Diagnosis Date    Ambulatory dysfunction     walker for long distances, SOB with distance    Aortic stenosis     echo 2017    Arthritis     hands and hips    Asthma     Bilateral hilar adenopathy syndrome 06/03/2013    CAD (coronary artery disease)     Dr. Javier Chanel Heart    Cardiomyopathy ([facility]) 04/19/2019    EF= 43%    CHF (congestive heart failure) ([facility])     Chronic pain     COPD (chronic obstructive pulmonary disease) ([facility])     pulmonology Dr. Batista    CVA (cerebral vascular accident) ([facility]) 05/21/2017    Depression     Diabetes mellitus ([facility])     borderline    Difficult intravenous access     Emphysema of lung ([facility])     ESRD (end stage renal disease) on dialysis ([facility])     MWF    Fear of needles     Gastric ulcer     GERD (gastroesophageal reflux disease)     Heart valve problem     bicuspic valve    Hemodialysis patient ([facility])     History of spinal fracture     work incident    Hx of blood clots     Bilateral lower extremities; stents in place    Hyperlipidemia     Hypertension     MI (myocardial infarction) ([facility]) 2019    has had 9 MIs. 2019 was the last    Neuromuscular disorder ([facility])     due to CVA    Numbness and tingling in left arm     from fistula    Pneumonia      IR TUNNELED CATHETER PLACEMENT GREATER THAN 5 YEARS  4/26/2022    IR TUNNELED CATHETER PLACEMENT GREATER THAN 5 YEARS 4/26/2022 [facility]    IR TUNNELED CATHETER PLACEMENT GREATER THAN 5 YEARS  6/23/2022    IR TUNNELED CATHETER PLACEMENT GREATER THAN 5 YEARS 6/23/2022 [facility] PROC[facility]    OTHER SURGICAL HISTORY  02/01/2017    laparoscopic cholecystectomy with intraoperative cholangiogram    OTHER SURGICAL HISTORY  2018    PORT PLACEMENT  - vas cath    OTHER SURGICAL HISTORY Bilateral 06/26/2018    laprascopic peritoneal dialysis catheter placement    OTHER SURGICAL HISTORY Right 09/2018    peritoneal dialysis port placed on right side of abdomen    OTHER SURGICAL HISTORY  05/28/2019    PTA/Stenting R External Iliac artery    CT LAPS INSERTION TUNNELED INTRAPERITONEAL CATHETER N/A 9/21/2018    LAPAROSCOPIC PERITONEAL DIALYSIS CATHETER REPLACEMENT performed by Remi Kincaid MD at [facility] OR    RECTAL SURGERY N/A 2/24/2022    PERINEAL ABCESS DRAINAGE performed by Remi Kincaid MD at [facility] OR    THORACENTESIS N/A 10/2/2022    THORACENTESIS ULTRASOUND performed by Charlie Esquivel MD at [facility] ENDOSCOPY    TONSILLECTOMY      UPPER GASTROINTESTINAL ENDOSCOPY  01/06/2016    UPPER GASTROINTESTINAL ENDOSCOPY  01/29/2017    possible candida, otherwise normal appearing    VASCULAR SURGERY  aprx 2 years ago    2 stents placed, each side of groin     Family History   Problem Relation Age of Onset    Diabetes Mother     Heart Disease Father     Kidney Disease Sister         stage 4-kidney failure    Cancer Sister     Heart Disease Sister     Obesity Sister     Cancer Sister     Heart Disease Sister     Obesity Sister     Alcohol Abuse Brother      Social History     Socioeconomic History    Marital status:      Spouse name: Not on file    Number of children: Not on file    Years of education: Not on file    Highest education level: Not on file   Occupational History    Not on file

## 2024-07-04 LAB
ALBUMIN SERPL-MCNC: 3 G/DL (ref 3.4–5)
ALBUMIN/GLOB SERPL: 0.9 {RATIO} (ref 1.1–2.2)
ALP SERPL-CCNC: 138 U/L (ref 40–129)
ALT SERPL-CCNC: 7 U/L (ref 10–40)
ANION GAP SERPL CALCULATED.3IONS-SCNC: 12 MMOL/L (ref 3–16)
AST SERPL-CCNC: 12 U/L (ref 15–37)
BILIRUB SERPL-MCNC: <0.2 MG/DL (ref 0–1)
BUN SERPL-MCNC: 70 MG/DL (ref 7–20)
CALCIUM SERPL-MCNC: 9.5 MG/DL (ref 8.3–10.6)
CHLORIDE SERPL-SCNC: 92 MMOL/L (ref 99–110)
CO2 SERPL-SCNC: 27 MMOL/L (ref 21–32)
CREAT SERPL-MCNC: 6.4 MG/DL (ref 0.9–1.3)
DEPRECATED RDW RBC AUTO: 17.7 % (ref 12.4–15.4)
GFR SERPLBLD CREATININE-BSD FMLA CKD-EPI: 10 ML/MIN/{1.73_M2}
GLUCOSE BLD-MCNC: 108 MG/DL (ref 70–99)
GLUCOSE BLD-MCNC: 142 MG/DL (ref 70–99)
GLUCOSE BLD-MCNC: 142 MG/DL (ref 70–99)
GLUCOSE SERPL-MCNC: 176 MG/DL (ref 70–99)
HCT VFR BLD AUTO: 25.7 % (ref 40.5–52.5)
HGB BLD-MCNC: 8.2 G/DL (ref 13.5–17.5)
MCH RBC QN AUTO: 26.7 PG (ref 26–34)
MCHC RBC AUTO-ENTMCNC: 31.9 G/DL (ref 31–36)
MCV RBC AUTO: 83.9 FL (ref 80–100)
PERFORMED ON: ABNORMAL
PLATELET # BLD AUTO: 222 K/UL (ref 135–450)
PMV BLD AUTO: 7.8 FL (ref 5–10.5)
POTASSIUM SERPL-SCNC: 5.6 MMOL/L (ref 3.5–5.1)
PROT SERPL-MCNC: 6.5 G/DL (ref 6.4–8.2)
RBC # BLD AUTO: 3.06 M/UL (ref 4.2–5.9)
SODIUM SERPL-SCNC: 131 MMOL/L (ref 136–145)
TROPONIN, HIGH SENSITIVITY: 111 NG/L (ref 0–22)
WBC # BLD AUTO: 5.1 K/UL (ref 4–11)

## 2024-07-04 PROCEDURE — 36415 COLL VENOUS BLD VENIPUNCTURE: CPT

## 2024-07-04 PROCEDURE — 6370000000 HC RX 637 (ALT 250 FOR IP): Performed by: INTERNAL MEDICINE

## 2024-07-04 PROCEDURE — 94660 CPAP INITIATION&MGMT: CPT

## 2024-07-04 PROCEDURE — 5A09557 ASSISTANCE WITH RESPIRATORY VENTILATION, GREATER THAN 96 CONSECUTIVE HOURS, CONTINUOUS POSITIVE AIRWAY PRESSURE: ICD-10-PCS | Performed by: INTERNAL MEDICINE

## 2024-07-04 PROCEDURE — 99232 SBSQ HOSP IP/OBS MODERATE 35: CPT | Performed by: INTERNAL MEDICINE

## 2024-07-04 PROCEDURE — 90935 HEMODIALYSIS ONE EVALUATION: CPT

## 2024-07-04 PROCEDURE — 84484 ASSAY OF TROPONIN QUANT: CPT

## 2024-07-04 PROCEDURE — 99222 1ST HOSP IP/OBS MODERATE 55: CPT | Performed by: INTERNAL MEDICINE

## 2024-07-04 PROCEDURE — 5A1D70Z PERFORMANCE OF URINARY FILTRATION, INTERMITTENT, LESS THAN 6 HOURS PER DAY: ICD-10-PCS | Performed by: INTERNAL MEDICINE

## 2024-07-04 PROCEDURE — 80053 COMPREHEN METABOLIC PANEL: CPT

## 2024-07-04 PROCEDURE — 85027 COMPLETE CBC AUTOMATED: CPT

## 2024-07-04 PROCEDURE — 83036 HEMOGLOBIN GLYCOSYLATED A1C: CPT

## 2024-07-04 PROCEDURE — 6360000002 HC RX W HCPCS: Performed by: INTERNAL MEDICINE

## 2024-07-04 PROCEDURE — 2580000003 HC RX 258: Performed by: INTERNAL MEDICINE

## 2024-07-04 PROCEDURE — 2700000000 HC OXYGEN THERAPY PER DAY

## 2024-07-04 PROCEDURE — 94761 N-INVAS EAR/PLS OXIMETRY MLT: CPT

## 2024-07-04 PROCEDURE — 2500000003 HC RX 250 WO HCPCS: Performed by: INTERNAL MEDICINE

## 2024-07-04 PROCEDURE — 1200000000 HC SEMI PRIVATE

## 2024-07-04 RX ORDER — IPRATROPIUM BROMIDE AND ALBUTEROL SULFATE 2.5; .5 MG/3ML; MG/3ML
1 SOLUTION RESPIRATORY (INHALATION) EVERY 4 HOURS PRN
Status: DISCONTINUED | OUTPATIENT
Start: 2024-07-04 | End: 2024-07-09 | Stop reason: HOSPADM

## 2024-07-04 RX ORDER — REGADENOSON 0.08 MG/ML
0.4 INJECTION, SOLUTION INTRAVENOUS
Status: CANCELLED | OUTPATIENT
Start: 2024-07-04

## 2024-07-04 RX ORDER — MIDODRINE HYDROCHLORIDE 5 MG/1
5 TABLET ORAL EVERY 6 HOURS PRN
Status: DISCONTINUED | OUTPATIENT
Start: 2024-07-04 | End: 2024-07-09 | Stop reason: HOSPADM

## 2024-07-04 RX ORDER — ROSUVASTATIN CALCIUM 10 MG/1
10 TABLET, COATED ORAL NIGHTLY
Status: DISCONTINUED | OUTPATIENT
Start: 2024-07-04 | End: 2024-07-05 | Stop reason: ALTCHOICE

## 2024-07-04 RX ORDER — DEXTROSE MONOHYDRATE 100 MG/ML
INJECTION, SOLUTION INTRAVENOUS CONTINUOUS PRN
Status: DISCONTINUED | OUTPATIENT
Start: 2024-07-04 | End: 2024-07-09 | Stop reason: HOSPADM

## 2024-07-04 RX ORDER — GLUCAGON 1 MG/ML
1 KIT INJECTION PRN
Status: DISCONTINUED | OUTPATIENT
Start: 2024-07-04 | End: 2024-07-09 | Stop reason: HOSPADM

## 2024-07-04 RX ORDER — HEPARIN SODIUM 1000 [USP'U]/ML
INJECTION, SOLUTION INTRAVENOUS; SUBCUTANEOUS
Status: DISPENSED
Start: 2024-07-04 | End: 2024-07-05

## 2024-07-04 RX ORDER — INSULIN LISPRO 100 [IU]/ML
0-4 INJECTION, SOLUTION INTRAVENOUS; SUBCUTANEOUS NIGHTLY
Status: DISCONTINUED | OUTPATIENT
Start: 2024-07-04 | End: 2024-07-09 | Stop reason: HOSPADM

## 2024-07-04 RX ORDER — INSULIN LISPRO 100 [IU]/ML
0-4 INJECTION, SOLUTION INTRAVENOUS; SUBCUTANEOUS
Status: DISCONTINUED | OUTPATIENT
Start: 2024-07-04 | End: 2024-07-09 | Stop reason: HOSPADM

## 2024-07-04 RX ORDER — LORAZEPAM 0.5 MG/1
0.5 TABLET ORAL EVERY 6 HOURS PRN
Status: DISCONTINUED | OUTPATIENT
Start: 2024-07-04 | End: 2024-07-09 | Stop reason: HOSPADM

## 2024-07-04 RX ADMIN — VENLAFAXINE HYDROCHLORIDE 75 MG: 75 CAPSULE, EXTENDED RELEASE ORAL at 11:32

## 2024-07-04 RX ADMIN — ROSUVASTATIN CALCIUM 10 MG: 10 TABLET, FILM COATED ORAL at 20:24

## 2024-07-04 RX ADMIN — QUETIAPINE FUMARATE 50 MG: 25 TABLET ORAL at 20:24

## 2024-07-04 RX ADMIN — CALCIUM ACETATE 1334 MG: 667 CAPSULE ORAL at 14:07

## 2024-07-04 RX ADMIN — CALCIUM ACETATE 1334 MG: 667 CAPSULE ORAL at 19:11

## 2024-07-04 RX ADMIN — SODIUM CHLORIDE, PRESERVATIVE FREE 10 ML: 5 INJECTION INTRAVENOUS at 11:33

## 2024-07-04 RX ADMIN — EPOETIN ALFA-EPBX 10000 UNITS: 10000 INJECTION, SOLUTION INTRAVENOUS; SUBCUTANEOUS at 18:37

## 2024-07-04 RX ADMIN — METOPROLOL SUCCINATE 25 MG: 25 TABLET, EXTENDED RELEASE ORAL at 11:33

## 2024-07-04 RX ADMIN — CALCIUM ACETATE 1334 MG: 667 CAPSULE ORAL at 11:32

## 2024-07-04 RX ADMIN — BUSPIRONE HYDROCHLORIDE 15 MG: 7.5 TABLET ORAL at 14:07

## 2024-07-04 RX ADMIN — PRAVASTATIN SODIUM 40 MG: 40 TABLET ORAL at 11:33

## 2024-07-04 RX ADMIN — SODIUM CHLORIDE, PRESERVATIVE FREE 10 ML: 5 INJECTION INTRAVENOUS at 20:26

## 2024-07-04 RX ADMIN — DULOXETINE HYDROCHLORIDE 60 MG: 60 CAPSULE, DELAYED RELEASE ORAL at 11:33

## 2024-07-04 RX ADMIN — SODIUM CHLORIDE, PRESERVATIVE FREE 10 ML: 5 INJECTION INTRAVENOUS at 00:55

## 2024-07-04 RX ADMIN — ASPIRIN 81 MG: 81 TABLET, CHEWABLE ORAL at 11:33

## 2024-07-04 RX ADMIN — TRAZODONE HYDROCHLORIDE 50 MG: 50 TABLET ORAL at 20:24

## 2024-07-04 RX ADMIN — APIXABAN 5 MG: 5 TABLET, FILM COATED ORAL at 20:24

## 2024-07-04 RX ADMIN — SACUBITRIL AND VALSARTAN 1 TABLET: 24; 26 TABLET, FILM COATED ORAL at 11:32

## 2024-07-04 RX ADMIN — BUSPIRONE HYDROCHLORIDE 15 MG: 7.5 TABLET ORAL at 11:32

## 2024-07-04 RX ADMIN — BUSPIRONE HYDROCHLORIDE 15 MG: 7.5 TABLET ORAL at 20:24

## 2024-07-04 RX ADMIN — APIXABAN 5 MG: 5 TABLET, FILM COATED ORAL at 11:33

## 2024-07-04 RX ADMIN — SACUBITRIL AND VALSARTAN 1 TABLET: 24; 26 TABLET, FILM COATED ORAL at 20:24

## 2024-07-04 NOTE — PROGRESS NOTES
07/04/24 0250   NIV Type   Mode AVAPS   Mask Type Full face mask   Mask Size Large   Assessment   Respirations 16   SpO2 100 %   Settings/Measurements   CPAP/EPAP 5 cmH2O   IPAP Min 12 cmH2O   IPAP Max 30 cmH2O   Vt (Set, mL) 475 mL   Vt (Measured) 527 mL   Rate Ordered 12   FiO2  35 %

## 2024-07-04 NOTE — PROGRESS NOTES
07/04/24 0006   NIV Type   Mode AVAPS   Mask Type Full face mask   Mask Size Large   Assessment   Respirations 24   SpO2 100 %   Settings/Measurements   CPAP/EPAP 5 cmH2O   IPAP Min 12 cmH2O   IPAP Max 30 cmH2O   Vt (Set, mL) 475 mL   Vt (Measured) 507 mL   Rate Ordered 12   FiO2  35 %

## 2024-07-04 NOTE — CONSULTS
ulceration.    Pysch: Euthymic mood, appropriate affect   Neurologic: Oriented to person, place and time. No slurred speech or facial asymmetry. No motor or sensory deficits on gross examination.         Labs:   CBC:   Lab Results   Component Value Date/Time    WBC 5.1 07/04/2024 04:38 AM    RBC 3.06 07/04/2024 04:38 AM    HGB 8.2 07/04/2024 04:38 AM    HCT 25.7 07/04/2024 04:38 AM    MCV 83.9 07/04/2024 04:38 AM    RDW 17.7 07/04/2024 04:38 AM     07/04/2024 04:38 AM     CMP:  Lab Results   Component Value Date/Time     07/04/2024 04:38 AM    K 5.6 07/04/2024 04:38 AM    CL 92 07/04/2024 04:38 AM    CO2 27 07/04/2024 04:38 AM    BUN 70 07/04/2024 04:38 AM    CREATININE 6.4 07/04/2024 04:38 AM    GFRAA 10 10/05/2022 11:53 AM    GFRAA >60 05/17/2013 06:50 AM    AGRATIO 0.9 07/04/2024 04:38 AM    LABGLOM 10 07/04/2024 04:38 AM    LABGLOM 9 04/10/2024 09:25 AM    LABGLOM 12 01/25/2024 12:00 AM    GLUCOSE 176 07/04/2024 04:38 AM    CALCIUM 9.5 07/04/2024 04:38 AM    BILITOT <0.2 07/04/2024 04:38 AM    ALKPHOS 138 07/04/2024 04:38 AM    AST 12 07/04/2024 04:38 AM    ALT 7 07/04/2024 04:38 AM     PT/INR:  No results found for: \"PTINR\"  HgBA1c:  Lab Results   Component Value Date    LABA1C 7.4 03/27/2024     Lab Results   Component Value Date    CKTOTAL 45 05/25/2022    TROPONINI 0.11 (H) 04/18/2023       Lab Results   Component Value Date    CHOL 134 03/28/2024    CHOL 138 12/05/2022    CHOL 147 07/17/2022     Lab Results   Component Value Date    TRIG 95 03/28/2024    TRIG 75 12/05/2022    TRIG 76 07/17/2022     Lab Results   Component Value Date    HDL 49 03/28/2024    HDL 58 12/05/2022    HDL 52 07/17/2022     No components found for: \"LDLCHOLESTEROL\", \"LDLCALC\"  Lab Results   Component Value Date    VLDL 19 03/28/2024    VLDL 15 12/05/2022    VLDL 15 07/17/2022     No results found for: \"CHOLHDLRATIO\"     Cardiac Data:     Telemetry personally reviewed:    EKG 07/03/2024: NSR @ 65 bpm with possible  inferior ischemia    Echo 04/01/2024: Summary   Technically difficult examination.   Patient did not want to continue with exam, definity not used.      Left ventricular systolic function is reduced with a visually estimated   ejection fraction of 35-45%.   The left ventricle is normal in size with mild concentric hypertrophy.   There is hypokinesis of the inferior wall. Cannot exclude further regional   wall motion abnormalities secondary to poor endocardial visualization.   indeterminate diastolic dysfunction.   The right ventricle is normal in size with normal systolic function.   Mitral annular calcification is present.   Mild mitral regurgitation.   inadequate tricuspid valve regurgitation to estimate systolic pulmonary   artery pressure.       Stress Test 05/01/2023:  Summary   Abnormal high risk myocardial perfusion study.   There is reduced activity within the alex-septal, infero-apical, and   posterior-lateral segments consistent with mainly prior infarction.   There is mild darrick-infarct ischemia near the mid-inferior segment.   The left ventricular size is severely dilated.   The estimated left ventricular function is 34 %.      Cardiac catheterization 0/01/2023: Findings:   1. Left main coronary artery was normal. It gave off the left anterior descending artery and left circumflex.     2. Left anterior descending artery has moderate atherosclerotic disease.  It was moderate in size. It gave off septal perforators and a moderate sized diagonal branch. The LAD covered the entire apex of the left ventricle.  There is a previously placed stent within the mid changes sending artery after the takeoff of the first diagonal branch.  There is moderate diffuse atherosclerosis of this segment.     3. Left circumflex has severe atherosclerotic disease.  It was moderate in size. There is a large ramus intermediate branch.  There is 90% eccentric plaque noted in the origin of the ramus intermediate branch and 90%

## 2024-07-04 NOTE — PROGRESS NOTES
Patient admitted to room 313 from ED. Patient oriented to room, call light, bed rails, phone, lights and bathroom. Patient instructed about the schedule of the day including: vital sign frequency, lab draws, possible tests, frequency of MD and staff rounds, daily weights, I &O's and prescribed diet.  Telemetry box in place, patient aware of placement and reason. Bed locked, in lowest position, side rails up 2/4, call light within reach.        Recliner Assessment  Patient is not able to demonstrated the ability to move from a reclining position to an upright position within the recliner. however patient is alert, oriented and able to provide informed consent       4 Eyes Skin Assessment     NAME:  Igor Ann  YOB: 1970  MEDICAL RECORD NUMBER:  [id number]    The patient is being assessed for  Admission    I agree that at least one RN has performed a thorough Head to Toe Skin Assessment on the patient. ALL assessment sites listed below have been assessed.      Areas assessed by both nurses:    Head, Face, Ears, Shoulders, Back, Chest, Arms, Elbows, Hands, Sacrum. Buttock, Coccyx, Ischium, Legs. Feet and Heels, and Under Medical Devices         Does the Patient have a Wound? Yes wound(s) were present on assessment. LDA wound assessment was Initiated and completed by RN       Isaac Prevention initiated by RN: Yes  Wound Care Orders initiated by RN: Yes    Pressure Injury (Stage 3,4, Unstageable, DTI, NWPT, and Complex wounds) if present, place Wound referral order by RN under : Yes    New Ostomies, if present place, Ostomy referral order under : Yes     Nurse 1 eSignature: Electronically signed by Crystal Gibson RN on 7/4/24 at 12:43 AM EDT    **SHARE this note so that the co-signing nurse can place an eSignature**    Nurse 2 eSignature: Electronically signed by Lara Crump RN on 7/4/24 at 12:53 AM EDT

## 2024-07-04 NOTE — FLOWSHEET NOTE
07/04/24 0006   Vital Signs   Temp 98.3 °F (36.8 °C)   Pulse 55   Heart Rate Source Monitor   Respirations 24   BP (!) 167/90   MAP (Calculated) 116   Oxygen Therapy   SpO2 100 %   O2 Device PAP (positive airway pressure)   Skin Assessment Clean, dry, & intact   Skin Protection for O2 Device Yes   Location Nose   FiO2  35 %     Pt in bed bipap in place per RT call light within reach VSS

## 2024-07-04 NOTE — PROGRESS NOTES
RT Inhaler-Nebulizer Bronchodilator Protocol Note    There is a bronchodilator order in the chart from a provider indicating to follow the RT Bronchodilator Protocol and there is an “Initiate RT Inhaler-Nebulizer Bronchodilator Protocol” order as well (see protocol at bottom of note).    CXR Findings:  XR CHEST PORTABLE    Result Date: 7/3/2024  Moderate left and small right pleural effusions with bibasilar airspace disease.  This could represent a combination of pneumonia and atelectasis.       The findings from the last RT Protocol Assessment were as follows:   History Pulmonary Disease: (P) Chronic pulmonary disease  Respiratory Pattern: (P) Regular pattern and RR 12-20 bpm  Breath Sounds: (P) Clear breath sounds  Cough: (P) Strong, spontaneous, non-productive  Indication for Bronchodilator Therapy: (P) On home bronchodilators  Bronchodilator Assessment Score: (P) 2    Aerosolized bronchodilator medication orders have been revised according to the RT Inhaler-Nebulizer Bronchodilator Protocol below.    Respiratory Therapist to perform RT Therapy Protocol Assessment initially then follow the protocol.  Repeat RT Therapy Protocol Assessment PRN for score 0-3 or on second treatment, BID, and PRN for scores above 3.    No Indications - adjust the frequency to every 6 hours PRN wheezing or bronchospasm, if no treatments needed after 48 hours then discontinue using Per Protocol order mode.     If indication present, adjust the RT bronchodilator orders based on the Bronchodilator Assessment Score as indicated below.  Use Inhaler orders unless patient has one or more of the following: on home nebulizer, not able to hold breath for 10 seconds, is not alert and oriented, cannot activate and use MDI correctly, or respiratory rate 25 breaths per minute or more, then use the equivalent nebulizer order(s) with same Frequency and PRN reasons based on the score.  If a patient is on this medication at home then do not decrease

## 2024-07-04 NOTE — PROGRESS NOTES
Treatment time: 3.5 hrs    Net UF:  4000 ml     Pre weight: 99.5 kg  Post weight: 94.5 kg     Access used: L tdc  Access function:  tolerated well,   ml/min due to high      Medications or blood products given: heparin dwells, retacrit 10kiu     Regular outpatient schedule: TTHS     Summary of response to treatment: Pt tolerated well. Pt remained stable throughout entire treatment and upon exiting the hemodialysis suite.      Copy of dialysis treatment record placed in chart, to be scanned into EMR.

## 2024-07-04 NOTE — PROGRESS NOTES
Handoff report given to Lara MORFIN.  Patient is in stable condition and has no needs at this time. Call light is in reach and bed is in the lowest position.  Care is transferred at this time.

## 2024-07-04 NOTE — H&P
[facility] Medicine History & Physical      Patient Name: Igor Ann    : 1970    PCP: Vonnie Cleveland APRN - NP    Date of Service:  Patient seen and examined on 7/3/24     Chief Complaint:  chest pain    History Of Present Illness:    Igor Ann is a 54 y.o. male with a PMH of CAD, CHF, emphysema, end-stage renal disease on HD, GERD, hypertension, hyperlipidemia, who presented to ED with complaint of chest pain    Patient presents to the ED with complaints of chest pain left-sided radiating to his left arm.  Resolved with nitroglycerin by EMS.  States that entire episode lasted about 10 minutes.  Currently asymptomatic.    Blood pressure 176/92, pulse 69, temperature 98.3, respiration 18, saturating 100%.  Labs show sodium of 134 creatinine potassium of 5.5 chloride of 91 creatinine of 6.2 glucose of 152, elevated proBNP troponin of 113.  WBC 6.1 hemoglobin 9.2.  PT 17.4 INR 1.4.  Influenza A/B/COVID-negative.  Blood gas pH 7.519, pCO2 34.4 pO2 98.7    Chest x-ray, moderate left and small right pleural effusion with bibasilar airspace disease.  Combination of pneumonia and atelectasis.  EKG shows normal sinus rhythm.  In the ED patient received Norco and Percocet.  Being admitted for chest pain rule out with cardiology consult    Past Medical History:    Patient has a past medical history of Ambulatory dysfunction, Aortic stenosis, Arthritis, Asthma, Bilateral hilar adenopathy syndrome, CAD (coronary artery disease), Cardiomyopathy ([facility]), CHF (congestive heart failure) ([facility]), Chronic pain, COPD (chronic obstructive pulmonary disease) ([facility]), CVA (cerebral vascular accident) ([facility]), Depression, Diabetes mellitus ([facility]), Difficult intravenous access, Emphysema of lung ([facility]), ESRD (end stage renal disease) on dialysis ([facility]), Fear of needles, Gastric ulcer, GERD (gastroesophageal reflux disease), Heart valve problem, Hemodialysis patient ([facility]), History of spinal fracture, Hx of blood  GERD, hypertension, hyperlipidemia, who presented to ED with complaint of chest pain.    # Chest pain  Patient presents with left-sided chest pain radiating to his left arm.  Improved with nitroglycerin.  Labs show elevated proBNP, troponins of 113  In the ED patient received Norco, Percocet.  Cardiology consult    #History of HFrEF  Patient on Entresto    #CAD  On statin and Metropol    End-stage renal disease on hemodialysis  Hemodialysis TTS  Nephrology consult    #COPD  #Chronic respiratory failure  Resume home inhalers    Type 2 diabetes  Resume home insulin regimen  Blood sugar checks before every meal and nightly    #History of hypertension hyperlipidemia  Resume home medication    History of VTE  On Eliquis    DVT prophylaxis: Eliquis    Diet: regular  Code Status: full    Consults:  IP CONSULT TO CARDIOLOGY  IP CONSULT TO NEPHROLOGY    Disposition:  Admit to Inpatient   ELOS:  Greater than two midnights due to medical therapy     Please note that portions of this note were completed with a voice recognition program.  Efforts were made to edit the dictations but occasionally words are mis-transcribed.)     Owen Willoughby MD

## 2024-07-04 NOTE — PROGRESS NOTES
07/04/24 1019   Encounter Summary   Encounter Overview/Reason Attempted Encounter  (Patient was sleeping)   Service Provided For Patient   Referral/Consult From [facility]   Support System Unknown   Last Encounter  07/04/24  ( left a prayer card and said a silent prayer)   Assessment/Intervention/Outcome   Assessment Unable to assess   Intervention Prayer (assurance of)/[place]

## 2024-07-04 NOTE — ED NOTES
ED RN attempted to draw blood out of PIV for second troponin. Attempted to straight stick patient x 2 with no luck. Lab contacted to obtain second troponin.

## 2024-07-04 NOTE — ED NOTES
Lab attempted to stick patient for blood and did not have any luck. Patient refusing any more sticks. MD aware.

## 2024-07-04 NOTE — FLOWSHEET NOTE
Pt had toes from right amputated refused to let staff look at foot stated it hurts too bad right now. Ace wrap dressing intact no visible drainage noted.

## 2024-07-04 NOTE — PROGRESS NOTES
Progress Note    Admit Date:  7/3/2024    Presented for CP   Hx of CAD,VTE, ESRD On HD, Systolic HF     Subjective:  Mr. Ann today seen resting in bed with CPAP on, chest pain has resolved.   Just complaining he is tired     Objective:   No data found.     No intake or output data in the 24 hours ending 07/04/24 1001    Physical Exam:    Gen: No distress. Alert.   Eyes: PERRL. No sclera icterus. No conjunctival injection.   Neck: No JVD.  Trachea midline.  Resp: No accessory muscle use. No crackles. No wheezes. No rhonchi. +Diminished breath sounds   CV: Regular rate. Regular rhythm. No murmur.  No rub. No edema.   Peripheral Pulses: +2 palpable, equal bilaterally   GI: Non-tender. Non-distended.  Normal bowel sounds.  Skin: Warm and dry. No nodule on exposed extremities. No rash on exposed extremities. 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 No joint deformity. No clubbing.   Neuro: Awake. Grossly nonfocal    Psych: Oriented. No anxiety or agitation.       I GALLTIO RODRIGUEZ MD have reviewed the chart on Igor Ann and personally interviewed and examined patient, reviewed the data (labs and imaging) and after discussion with my PA formulated the plan.  Agree with note with the following edits.      I reviewed the patient's Past Medical History, Past Surgical History, Medications, and Allergies.     Physical exam:    BP (!) 149/83   Pulse 61   Temp 97.7 °F (36.5 °C) (Oral)   Resp 16   Ht 1.753 m (5' 9\")   Wt 99.5 kg (219 lb 6.4 oz)   SpO2 97%   BMI 32.40 kg/m²     Gen: No distress. Alert.   Eyes: PERRL. No sclera icterus. No conjunctival injection.   ENT: No discharge. Pharynx clear.   Neck: Trachea midline. Normal thyroid.   Resp: No accessory muscle use. No crackles. No wheezes. No rhonchi. No dullness on percussion.  CV: Regular rate. Regular rhythm. No murmur or rub. No edema.   GI: Non-tender. Non-distended. No masses. No  Detected results do not preclude SARS-CoV-2 infection and  should not be used as the sole basis for patient management  decisions.  Results must be combined with clinical observations,  patient history, and epidemiological information.  Testing was performed using CORINNE LOREN SARS-CoV-2 and Influenza A/B  nucleic acid assay. This test is a multiplex Real-Time Reverse  Transcriptase Polymerase Chain Reaction (RT-PCR)-based in vitro  diagnostic test intended for the qualitative detection of nucleic  acids from SARS-CoV-2, influenza A, and influenza B in nasopharyngeal  and nasal swab specimens for use under the FDA’s Emergency Use  Authorization (EUA) only.    Patient Fact Sheet:  https://www.fda.gov/media/158230/download  Provider Fact Sheet: https://www.fda.gov/media/959311/download  EUA: https://www.fda.gov/media/222682/download  IFU: https://www.fda.gov/media/683650/download    Methodology:  RT-PCR          Influenza A NOT DETECTED     Influenza B NOT DETECTED               RADIOLOGY  XR CHEST PORTABLE   Final Result   Moderate left and small right pleural effusions with bibasilar airspace   disease.  This could represent a combination of pneumonia and atelectasis.               Assessment/Plan:  #Chest pain   #Hx of CAD   #EKG changes with new T wave inversions  -elevated troponin but it is flat   -CP resolved with nitro   -on ASA, statin, BB   -cardiology consulted   ECHO   Nuclear stress test     #Bilateral pleural effusions   -fluid removal per dialysis   -stable on baseline oxygen  -denies s/s of pneumonia     #Chronic systolic CHF   -EF 35-45%, last echo from 3/27/24  -daily weights, intake and output  -continue BB, entresto   -cardiology consulted     #Elevated troponin   -113-->112-->111  -could be 2/2 to renal disease     #ESRD on HD   -HD MWF   -on phoslo  -renal diet   -nephrology consulted    #Chronic hypoxic respiratory failure   #COPD without AE   -stable on baseline 4 L O2   -continue prn inhalers

## 2024-07-04 NOTE — CONSULTS
[email or web address]  Nephrology Consult Note           Reason for Consult: ESRD  Requesting Physician:  Dr. Lawrence    Chief Complaint:    Chief Complaint   Patient presents with    Chest Pain     Pt to ED via EMS from [facility] s/p reports of chest pain \"discomfort\" and hypertension. Pt was given 1 nitro and 324mg ASA from nursing home.      History of Present Illness on 7/4/2024:    54 y.o. yo male with PMH of ESRD, CAD status post PCI, CHF, COPD, HTN, DM, CVA and HTN, PAD status post iliac stents who is admitted for chest pain  Patient is over 5 kg from his target weight and nephrology has been asked to see him for dialysis needs  Given his cardiac history, cardiology was consulted and they are planning on stress test and echocardiogram in the morning    Past Medical History:        Diagnosis Date    Ambulatory dysfunction     walker for long distances, SOB with distance    Aortic stenosis     echo 2017    Arthritis     hands and hips    Asthma     Bilateral hilar adenopathy syndrome 06/03/2013    CAD (coronary artery disease)     Dr. Javier Chanel Heart    Cardiomyopathy ([facility]) 04/19/2019    EF= 43%    CHF (congestive heart failure) ([facility])     Chronic pain     COPD (chronic obstructive pulmonary disease) ([facility])     pulmonology Dr. Batista    CVA (cerebral vascular accident) ([facility]) 05/21/2017    Depression     Diabetes mellitus ([facility])     borderline    Difficult intravenous access     Emphysema of lung ([facility])     ESRD (end stage renal disease) on dialysis ([facility])     MWF    Fear of needles     Gastric ulcer     GERD (gastroesophageal reflux disease)     Heart valve problem     bicuspic valve    Hemodialysis patient ([facility])     History of spinal fracture     work incident    Hx of blood clots     Bilateral lower extremities; stents in place    Hyperlipidemia     Hypertension     MI (myocardial infarction) ([facility]) 2019    has had 9 MIs. 2019 was the last    Neuromuscular disorder ([facility])     due to CVA    Numbness and tingling in

## 2024-07-04 NOTE — PLAN OF CARE
Problem: Discharge Planning  Goal: Discharge to home or other facility with appropriate resources  7/4/2024 0531 by Crystal Christopher RN  Outcome: Progressing  7/4/2024 0519 by Crystal Christopher RN  Outcome: Progressing     Problem: Pain  Goal: Verbalizes/displays adequate comfort level or baseline comfort level  7/4/2024 0531 by Crystal Christopher RN  Outcome: Progressing  7/4/2024 0519 by Crystal Christopher RN  Outcome: Progressing     Problem: Safety - Adult  Goal: Free from fall injury  7/4/2024 0531 by Crystal Christopher RN  Outcome: Progressing  7/4/2024 0519 by Crystal Christopher RN  Outcome: Progressing

## 2024-07-05 ENCOUNTER — APPOINTMENT (OUTPATIENT)
Dept: NUCLEAR MEDICINE | Age: 54
DRG: 280 | End: 2024-07-05
Attending: INTERNAL MEDICINE
Payer: MEDICARE

## 2024-07-05 ENCOUNTER — HOSPITAL ENCOUNTER (INPATIENT)
Age: 54
Discharge: HOME | DRG: 280 | End: 2024-07-07
Attending: INTERNAL MEDICINE
Payer: MEDICARE

## 2024-07-05 ENCOUNTER — APPOINTMENT (OUTPATIENT)
Age: 54
DRG: 280 | End: 2024-07-05
Attending: INTERNAL MEDICINE
Payer: MEDICARE

## 2024-07-05 VITALS
WEIGHT: 225 LBS | BODY MASS INDEX: 33.33 KG/M2 | HEIGHT: 69 IN | SYSTOLIC BLOOD PRESSURE: 105 MMHG | DIASTOLIC BLOOD PRESSURE: 59 MMHG

## 2024-07-05 LAB
ALBUMIN SERPL-MCNC: 3.2 G/DL (ref 3.4–5)
ALBUMIN/GLOB SERPL: 0.8 {RATIO} (ref 1.1–2.2)
ALP SERPL-CCNC: 155 U/L (ref 40–129)
ALT SERPL-CCNC: 12 U/L (ref 10–40)
ANION GAP SERPL CALCULATED.3IONS-SCNC: 11 MMOL/L (ref 3–16)
AST SERPL-CCNC: 11 U/L (ref 15–37)
BASOPHILS # BLD: 0.1 K/UL (ref 0–0.2)
BASOPHILS NFR BLD: 0.8 %
BILIRUB SERPL-MCNC: <0.2 MG/DL (ref 0–1)
BUN SERPL-MCNC: 51 MG/DL (ref 7–20)
CALCIUM SERPL-MCNC: 9.5 MG/DL (ref 8.3–10.6)
CHLORIDE SERPL-SCNC: 94 MMOL/L (ref 99–110)
CO2 SERPL-SCNC: 25 MMOL/L (ref 21–32)
CREAT SERPL-MCNC: 5 MG/DL (ref 0.9–1.3)
DEPRECATED RDW RBC AUTO: 17.5 % (ref 12.4–15.4)
ECHO BSA: 2.21 M2
ECHO BSA: 2.23 M2
EOSINOPHIL # BLD: 0.3 K/UL (ref 0–0.6)
EOSINOPHIL NFR BLD: 4 %
EST. AVERAGE GLUCOSE BLD GHB EST-MCNC: 125.5 MG/DL
GFR SERPLBLD CREATININE-BSD FMLA CKD-EPI: 13 ML/MIN/{1.73_M2}
GLUCOSE BLD-MCNC: 188 MG/DL (ref 70–99)
GLUCOSE BLD-MCNC: 192 MG/DL (ref 70–99)
GLUCOSE BLD-MCNC: 244 MG/DL (ref 70–99)
GLUCOSE BLD-MCNC: 95 MG/DL (ref 70–99)
GLUCOSE SERPL-MCNC: 112 MG/DL (ref 70–99)
HBA1C MFR BLD: 6 %
HCT VFR BLD AUTO: 30.6 % (ref 40.5–52.5)
HGB BLD-MCNC: 9.7 G/DL (ref 13.5–17.5)
LYMPHOCYTES # BLD: 0.7 K/UL (ref 1–5.1)
LYMPHOCYTES NFR BLD: 10.6 %
MCH RBC QN AUTO: 26.6 PG (ref 26–34)
MCHC RBC AUTO-ENTMCNC: 31.8 G/DL (ref 31–36)
MCV RBC AUTO: 83.6 FL (ref 80–100)
MONOCYTES # BLD: 0.6 K/UL (ref 0–1.3)
MONOCYTES NFR BLD: 9.4 %
NEUTROPHILS # BLD: 5.1 K/UL (ref 1.7–7.7)
NEUTROPHILS NFR BLD: 75.2 %
NUC STRESS EJECTION FRACTION: 36 %
NUC STRESS LV EDV: 211 ML (ref 67–155)
NUC STRESS LV ESV: 135 ML (ref 22–58)
NUC STRESS LV MASS: 203 G
PERFORMED ON: ABNORMAL
PERFORMED ON: NORMAL
PLATELET # BLD AUTO: 239 K/UL (ref 135–450)
PMV BLD AUTO: 7.7 FL (ref 5–10.5)
POTASSIUM SERPL-SCNC: 5.2 MMOL/L (ref 3.5–5.1)
PROT SERPL-MCNC: 7.1 G/DL (ref 6.4–8.2)
RBC # BLD AUTO: 3.66 M/UL (ref 4.2–5.9)
SODIUM SERPL-SCNC: 130 MMOL/L (ref 136–145)
STRESS BASELINE DIAS BP: 72 MMHG
STRESS BASELINE HR: 78 BPM
STRESS BASELINE SYS BP: 125 MMHG
STRESS ESTIMATED WORKLOAD: 1 METS
STRESS PEAK DIAS BP: 50 MMHG
STRESS PEAK SYS BP: 155 MMHG
STRESS PERCENT HR ACHIEVED: 68 %
STRESS POST PEAK HR: 111 BPM
STRESS RATE PRESSURE PRODUCT: ABNORMAL BPM*MMHG
STRESS TARGET HR: 164 BPM
WBC # BLD AUTO: 6.8 K/UL (ref 4–11)

## 2024-07-05 PROCEDURE — 2700000000 HC OXYGEN THERAPY PER DAY

## 2024-07-05 PROCEDURE — 6370000000 HC RX 637 (ALT 250 FOR IP): Performed by: INTERNAL MEDICINE

## 2024-07-05 PROCEDURE — 80053 COMPREHEN METABOLIC PANEL: CPT

## 2024-07-05 PROCEDURE — 3430000000 HC RX DIAGNOSTIC RADIOPHARMACEUTICAL: Performed by: INTERNAL MEDICINE

## 2024-07-05 PROCEDURE — 99232 SBSQ HOSP IP/OBS MODERATE 35: CPT | Performed by: INTERNAL MEDICINE

## 2024-07-05 PROCEDURE — 2500000003 HC RX 250 WO HCPCS: Performed by: INTERNAL MEDICINE

## 2024-07-05 PROCEDURE — 1200000000 HC SEMI PRIVATE

## 2024-07-05 PROCEDURE — 93017 CV STRESS TEST TRACING ONLY: CPT

## 2024-07-05 PROCEDURE — 78452 HT MUSCLE IMAGE SPECT MULT: CPT | Performed by: STUDENT IN AN ORGANIZED HEALTH CARE EDUCATION/TRAINING PROGRAM

## 2024-07-05 PROCEDURE — 6360000002 HC RX W HCPCS: Performed by: INTERNAL MEDICINE

## 2024-07-05 PROCEDURE — A9502 TC99M TETROFOSMIN: HCPCS | Performed by: INTERNAL MEDICINE

## 2024-07-05 PROCEDURE — 93308 TTE F-UP OR LMTD: CPT

## 2024-07-05 PROCEDURE — 93016 CV STRESS TEST SUPVJ ONLY: CPT | Performed by: STUDENT IN AN ORGANIZED HEALTH CARE EDUCATION/TRAINING PROGRAM

## 2024-07-05 PROCEDURE — 78452 HT MUSCLE IMAGE SPECT MULT: CPT

## 2024-07-05 PROCEDURE — 94640 AIRWAY INHALATION TREATMENT: CPT

## 2024-07-05 PROCEDURE — 94761 N-INVAS EAR/PLS OXIMETRY MLT: CPT

## 2024-07-05 PROCEDURE — 36415 COLL VENOUS BLD VENIPUNCTURE: CPT

## 2024-07-05 PROCEDURE — 2580000003 HC RX 258: Performed by: INTERNAL MEDICINE

## 2024-07-05 PROCEDURE — 85025 COMPLETE CBC W/AUTO DIFF WBC: CPT

## 2024-07-05 PROCEDURE — 93308 TTE F-UP OR LMTD: CPT | Performed by: INTERNAL MEDICINE

## 2024-07-05 PROCEDURE — 93018 CV STRESS TEST I&R ONLY: CPT | Performed by: STUDENT IN AN ORGANIZED HEALTH CARE EDUCATION/TRAINING PROGRAM

## 2024-07-05 PROCEDURE — 94660 CPAP INITIATION&MGMT: CPT

## 2024-07-05 RX ORDER — AMINOPHYLLINE 25 MG/ML
100 INJECTION, SOLUTION INTRAVENOUS ONCE
Status: COMPLETED | OUTPATIENT
Start: 2024-07-05 | End: 2024-07-05

## 2024-07-05 RX ORDER — REGADENOSON 0.08 MG/ML
0.4 INJECTION, SOLUTION INTRAVENOUS
Status: COMPLETED | OUTPATIENT
Start: 2024-07-05 | End: 2024-07-05

## 2024-07-05 RX ORDER — ATORVASTATIN CALCIUM 40 MG/1
80 TABLET, FILM COATED ORAL NIGHTLY
Status: DISCONTINUED | OUTPATIENT
Start: 2024-07-05 | End: 2024-07-09 | Stop reason: HOSPADM

## 2024-07-05 RX ADMIN — DULOXETINE HYDROCHLORIDE 60 MG: 60 CAPSULE, DELAYED RELEASE ORAL at 13:16

## 2024-07-05 RX ADMIN — OXYCODONE 10 MG: 5 TABLET ORAL at 20:25

## 2024-07-05 RX ADMIN — VENLAFAXINE HYDROCHLORIDE 75 MG: 75 CAPSULE, EXTENDED RELEASE ORAL at 13:16

## 2024-07-05 RX ADMIN — ATORVASTATIN CALCIUM 80 MG: 40 TABLET, FILM COATED ORAL at 20:22

## 2024-07-05 RX ADMIN — INSULIN GLARGINE 15 UNITS: 100 INJECTION, SOLUTION SUBCUTANEOUS at 20:23

## 2024-07-05 RX ADMIN — TETROFOSMIN 33.3 MILLICURIE: 1.38 INJECTION, POWDER, LYOPHILIZED, FOR SOLUTION INTRAVENOUS at 11:04

## 2024-07-05 RX ADMIN — QUETIAPINE FUMARATE 50 MG: 25 TABLET ORAL at 20:22

## 2024-07-05 RX ADMIN — ASPIRIN 81 MG: 81 TABLET, CHEWABLE ORAL at 13:17

## 2024-07-05 RX ADMIN — CALCIUM ACETATE 1334 MG: 667 CAPSULE ORAL at 13:20

## 2024-07-05 RX ADMIN — SACUBITRIL AND VALSARTAN 1 TABLET: 24; 26 TABLET, FILM COATED ORAL at 20:22

## 2024-07-05 RX ADMIN — TETROFOSMIN 11.1 MILLICURIE: 1.38 INJECTION, POWDER, LYOPHILIZED, FOR SOLUTION INTRAVENOUS at 09:10

## 2024-07-05 RX ADMIN — BUSPIRONE HYDROCHLORIDE 15 MG: 7.5 TABLET ORAL at 13:16

## 2024-07-05 RX ADMIN — APIXABAN 5 MG: 5 TABLET, FILM COATED ORAL at 13:16

## 2024-07-05 RX ADMIN — CALCIUM ACETATE 1334 MG: 667 CAPSULE ORAL at 17:00

## 2024-07-05 RX ADMIN — BUSPIRONE HYDROCHLORIDE 15 MG: 7.5 TABLET ORAL at 20:23

## 2024-07-05 RX ADMIN — SACUBITRIL AND VALSARTAN 1 TABLET: 24; 26 TABLET, FILM COATED ORAL at 13:16

## 2024-07-05 RX ADMIN — REGADENOSON 0.4 MG: 0.08 INJECTION, SOLUTION INTRAVENOUS at 11:04

## 2024-07-05 RX ADMIN — TRAZODONE HYDROCHLORIDE 50 MG: 50 TABLET ORAL at 20:23

## 2024-07-05 RX ADMIN — IPRATROPIUM BROMIDE AND ALBUTEROL SULFATE 1 DOSE: 2.5; .5 SOLUTION RESPIRATORY (INHALATION) at 21:10

## 2024-07-05 RX ADMIN — SODIUM CHLORIDE, PRESERVATIVE FREE 5 ML: 5 INJECTION INTRAVENOUS at 21:10

## 2024-07-05 RX ADMIN — SODIUM CHLORIDE, PRESERVATIVE FREE 10 ML: 5 INJECTION INTRAVENOUS at 08:59

## 2024-07-05 RX ADMIN — AMINOPHYLLINE 100 MG: 25 INJECTION, SOLUTION INTRAVENOUS at 11:07

## 2024-07-05 ASSESSMENT — PAIN DESCRIPTION - FREQUENCY: FREQUENCY: CONTINUOUS

## 2024-07-05 ASSESSMENT — PAIN DESCRIPTION - ORIENTATION
ORIENTATION: LOWER
ORIENTATION: LOWER

## 2024-07-05 ASSESSMENT — PAIN DESCRIPTION - DESCRIPTORS
DESCRIPTORS: SHOOTING;TENDER
DESCRIPTORS: ACHING

## 2024-07-05 ASSESSMENT — PAIN SCALES - GENERAL
PAINLEVEL_OUTOF10: 0
PAINLEVEL_OUTOF10: 8
PAINLEVEL_OUTOF10: 8

## 2024-07-05 ASSESSMENT — PAIN DESCRIPTION - LOCATION
LOCATION: BACK
LOCATION: BACK

## 2024-07-05 ASSESSMENT — PAIN - FUNCTIONAL ASSESSMENT: PAIN_FUNCTIONAL_ASSESSMENT: PREVENTS OR INTERFERES SOME ACTIVE ACTIVITIES AND ADLS

## 2024-07-05 ASSESSMENT — PAIN DESCRIPTION - PAIN TYPE: TYPE: CHRONIC PAIN

## 2024-07-05 NOTE — CONSULTS
[facility] Nurse  Consult Note       NAME:  Igor Ann  MEDICAL RECORD NUMBER:  [id number]  AGE: 54 y.o.   GENDER: male  : 1970  TODAY'S DATE:  2024    Subjective   Reason for WOCN Evaluation and Assessment: Right foot TMA      Igor Ann is a 54 y.o. male referred by:   [x] Physician  [x] Nursing  [] Other:     Wound Identification:  Wound Type: diabetic, arterial  Contributing Factors: diabetes, arterial insuffiencey, poor glucose control     Pt seen for wound care.  Pt admitted for chest pain and csrdiac workup.  Pt is scheduled for Cardiac cath on Monday due to abbnormal stress test today.  Transfer planned to [facility] on Monday.  Opt is s/p right TMA in 2024 with I&D and graft placement in May of 2024.  He is followed by Dr Berg.  He had an appointment for follow up a few days ago but missed the appointment due to admission.      Patient Goal of Care:  [x] Wound Healing  [] Odor Control  [] Palliative Care  [] Pain Control   [] Other:         PAST MEDICAL HISTORY        Diagnosis Date    Ambulatory dysfunction     walker for long distances, SOB with distance    Aortic stenosis     echo     Arthritis     hands and hips    Asthma     Bilateral hilar adenopathy syndrome 2013    CAD (coronary artery disease)     Dr. Javier Chanel Heart    Cardiomyopathy ([facility]) 2019    EF= 43%    CHF (congestive heart failure) ([facility])     Chronic pain     COPD (chronic obstructive pulmonary disease) ([facility])     pulmonology Dr. Batista    CVA (cerebral vascular accident) ([facility]) 2017    Depression     Diabetes mellitus ([facility])     borderline    Difficult intravenous access     Emphysema of lung ([facility])     ESRD (end stage renal disease) on dialysis ([facility])     MWF    Fear of needles     Gastric ulcer     GERD (gastroesophageal reflux disease)     Heart valve problem     bicuspic valve    Hemodialysis patient ([facility])     History of spinal fracture     work incident

## 2024-07-05 NOTE — PROGRESS NOTES
07/04/24 2207   NIV Type   NIV Started/Stopped (S)  On   Equipment Type V60   Mode AVAPS   Mask Type Full face mask   Mask Size Large   Assessment   Pulse 71   Respirations 21   SpO2 98 %   Level of Consciousness 0   Comfort Level Good   Using Accessory Muscles No   Mask Compliance Good   Skin Assessment Clean, dry, & intact   Skin Protection for O2 Device Yes   Settings/Measurements   CPAP/EPAP 5 cmH2O   IPAP Min 12 cmH2O   IPAP Max 30 cmH2O   Vt (Measured) 495 mL   Rate Ordered 12   Insp Rise Time (%) 3 %   FiO2  40 %   Minute Volume (L/min) 10.1 Liters   Mask Leak (lpm) 9 lpm   Patient's Home Machine No   Alarm Settings   Alarms On Y

## 2024-07-05 NOTE — PROGRESS NOTES
Progress Note    Admit Date:  7/3/2024    Presented for CP   Hx of CAD,VTE, ESRD On HD, Systolic HF     Subjective:  Mr. Ann today seen resting in bed with CPAP on, chest pain has resolved.   C/o feeling fatigued     Objective:   No data found.       Intake/Output Summary (Last 24 hours) at 7/5/2024 0852  Last data filed at 7/4/2024 1832  Gross per 24 hour   Intake 720 ml   Output 4500 ml   Net -3780 ml       Physical Exam:    Gen: No distress. Alert.   Eyes: PERRL. No sclera icterus. No conjunctival injection.   Neck: No JVD.  Trachea midline.  Resp: No accessory muscle use. No crackles. No wheezes. No rhonchi. +Diminished breath sounds   CV: Regular rate. Regular rhythm. No murmur.  No rub. No edema.   Peripheral Pulses: +2 palpable, equal bilaterally   GI: Non-tender. Non-distended.  Normal bowel sounds.  Skin: Warm and dry. No nodule on exposed extremities. No rash on exposed extremities. 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 No joint deformity. No clubbing.   Neuro: Awake. Grossly nonfocal    Psych: Oriented. No anxiety or agitation.           Medications:  epoetin siddharth-epbx, 10,000 Units, Once per day on Tue Thu Sat  insulin lispro, 0-4 Units, TID WC  insulin lispro, 0-4 Units, Nightly  rosuvastatin, 10 mg, Nightly  sodium chloride flush, 5-40 mL, 2 times per day  aspirin, 81 mg, Daily  apixaban, 5 mg, BID  busPIRone, 15 mg, TID  calcium acetate, 2 capsule, TID WC  insulin glargine, 15 Units, Nightly  DULoxetine, 60 mg, Daily  metoprolol succinate, 25 mg, Daily  QUEtiapine, 50 mg, Nightly  sacubitril-valsartan, 1 tablet, BID  venlafaxine, 75 mg, Daily  traZODone, 50 mg, Nightly      PRN Medications:  ipratropium 0.5 mg-albuterol 2.5 mg, 1 Dose, Q4H PRN  LORazepam, 0.5 mg, Q6H PRN  midodrine, 5 mg, Q6H PRN  glucose, 4 tablet, PRN  dextrose bolus, 125 mL, PRN   Or  dextrose bolus, 250 mL, PRN  glucagon (rDNA), 1 mg, PRN  dextrose, , Continuous

## 2024-07-05 NOTE — PROGRESS NOTES
Hand off report given to  NAVJOT Tracy.   Patient is stable showing no signs of distress and has no current needs at this time.   Call light is in reach and bed is in lowest position.    Care is transferred at this time.

## 2024-07-05 NOTE — PROGRESS NOTES
Spouse called to check on pt. Gave her update. She asked we have patient call her when he returns from stress test.

## 2024-07-05 NOTE — PROGRESS NOTES
chloride, ondansetron **OR** ondansetron, acetaminophen **OR** acetaminophen, polyethylene glycol, oxyCODONE HCl    Lab Data:  CBC:   Recent Labs     07/03/24  1633 07/04/24  0438 07/05/24  0507   WBC 6.1 5.1 6.8   HGB 9.2* 8.2* 9.7*   HCT 29.4* 25.7* 30.6*   MCV 84.7 83.9 83.6    222 239     BMP:   Recent Labs     07/03/24  1827 07/04/24  0438 07/05/24  0507   * 131* 130*   K 5.5* 5.6* 5.2*   CL 91* 92* 94*   CO2 32 27 25   BUN 57* 70* 51*   CREATININE 6.2* 6.4* 5.0*     LIVER PROFILE:   Recent Labs     07/03/24  1827 07/04/24 0438 07/05/24  0507   AST 11* 12* 11*   ALT 11 7* 12   BILITOT <0.2 <0.2 <0.2   ALKPHOS 160* 138* 155*     PT/INR:   Recent Labs     07/03/24  1644   PROTIME 17.4*   INR 1.40*     APTT: No results for input(s): \"APTT\" in the last 72 hours.  BNP:  No results for input(s): \"BNP\" in the last 72 hours.  IMAGING:   Nuclear stress test 7.5.24    Stress Combined Conclusion: This is an abnormal pharmacological myocardial perfusion study. Findings suggest a high risk of cardiac events.    Perfusion Comments: LV perfusion is abnormal.    Perfusion Defect: There are multiple perfusion defects. There is a moderate sized perfusion defect involving the apical cap, apicolateral wall, and apical anterior wall of moderate to severe intensity that is reversible consistent with myocardial ischemia.There is a moderate to large sized perfusion defect involving the anterior wall that appears reversibile consistent with myocardial ischemia. Additionally, there is a large sized perfusion defect involving the inferior wall of moderate intensity with partial reversibility. This is most consistent with prior infarct and darrick-infarct ischemia.    Stress Function: Left ventricular size appears mild-moderately dilated. There is severe global hypokinesis. Left ventricular function is moderate-severely reduced with calculated LVEF of 37%.    Perfusion Conclusion: There is no evidence of transient ischemic  weekend  Left heart cath cor angiogram on Monday. Discussed with patient.  Patient has difficult access.          PATTIE NAIK MD, MD 7/5/2024 12:24 PM

## 2024-07-05 NOTE — DISCHARGE INSTR - COC
Continuity of Care Form    Patient Name: Igor Ann   :  1970  MRN:  [id number]    Admit date:  7/3/2024  Discharge date:  24    Code Status Order: Full Code   Advance Directives:     Admitting Physician:  Owen Willoughby MD  PCP: Vonnie Cleveland APRN - NP    Discharging Nurse: Sue Jones  Discharging Hospital Unit/Room#: /0313-02  Discharging Unit Phone Number: [phone]    Emergency Contact:   Extended Emergency Contact Information  Primary Emergency Contact: Crystal Ann  Address: [address]                      [place] [address] [facility]  Home Phone: [phone]  Mobile Phone: [phone]  Relation: Spouse  Secondary Emergency Contact: Sridevi Salmeron           [place], [place]  Home Phone: [phone]  Mobile Phone: [phone]  Relation: Brother/Sister  Preferred language: English   needed? No    Past Surgical History:  Past Surgical History:   Procedure Laterality Date    AORTIC VALVE REPLACEMENT N/A 10/15/2019    TRANSCATHETER AORTIC VALVE REPLACEMENT FEMORAL APPROACH performed by Dion Holland MD at [facility]    CATHETER REMOVAL Right 2019    PERITONEAL DIALYSIS CATHETER REMOVAL performed by Remi Kincaid MD at [facility] OR    COLONOSCOPY      COLONOSCOPY      WN    CORONARY ANGIOPLASTY WITH STENT PLACEMENT  05/26/15    CYST REMOVAL  2013    EXCISION CYSTS, NECK X2 AND ABDOMINAL benign    DIAGNOSTIC CARDIAC CATH LAB PROCEDURE      DIALYSIS FISTULA CREATION Left 10/30/2017    LEFT BRACHIAL CEPHALIC FISTULA    DIALYSIS FISTULA CREATION Left 3/27/2019    LIGATION  AV FISTULA performed by Brenden Rosario MD at [facility] OR    ENDOSCOPY, COLON, DIAGNOSTIC      FOOT DEBRIDEMENT Right 2023    INCISION AND DEBRIDEMENT RIGHT FOOT WITH performed by Cely Rodriguez DPM at [facility] OR    FOOT DEBRIDEMENT Right 2024    RIGHT FOOT DEBRIDEMENT,  INCISION AND DRAINAGE,  BONE RESECTION,  GRAFT APPLICATION performed by

## 2024-07-05 NOTE — PROGRESS NOTES
Blood pressure (!) 152/71, pulse 62, temperature 98.4 °F (36.9 °C), temperature source Oral, resp. rate 20, height 1.753 m (5' 9\"), weight 95.5 kg (210 lb 8.6 oz), SpO2 97 %.    Shift assessment completed see flow sheet. Patient in bed alert and oriented x4. Patient on 4L O2 baseline, showing no signs of distress. Evening medications given per order. Patient has no other needs at this time. Patient refusing bed alarm, has been educated on fall safety.

## 2024-07-05 NOTE — PROGRESS NOTES
[email or web address]  Nephrology Follow up Note           Reason for Consult: ESRD  Requesting Physician:  Dr. Lawrence    Sub/interval history  Resting  Stress test done 7/5    HD on 7/4 w 4l net UF, post wt 94.5 kg     ROS: No chest pain/shortness of breath/fever/nausea/vomiting  PSFH: No visitor    Scheduled Meds:   atorvastatin  80 mg Oral Nightly    epoetin siddharth-epbx  10,000 Units IntraVENous Once per day on Tue Thu Sat    insulin lispro  0-4 Units SubCUTAneous TID WC    insulin lispro  0-4 Units SubCUTAneous Nightly    sodium chloride flush  5-40 mL IntraVENous 2 times per day    aspirin  81 mg Oral Daily    busPIRone  15 mg Oral TID    calcium acetate  2 capsule Oral TID WC    insulin glargine  15 Units SubCUTAneous Nightly    DULoxetine  60 mg Oral Daily    metoprolol succinate  25 mg Oral Daily    QUEtiapine  50 mg Oral Nightly    sacubitril-valsartan  1 tablet Oral BID    venlafaxine  75 mg Oral Daily    traZODone  50 mg Oral Nightly     Continuous Infusions:   dextrose      sodium chloride       PRN Meds:.ipratropium 0.5 mg-albuterol 2.5 mg, LORazepam, midodrine, glucose, dextrose bolus **OR** dextrose bolus, glucagon (rDNA), dextrose, sodium chloride flush, sodium chloride, ondansetron **OR** ondansetron, acetaminophen **OR** acetaminophen, polyethylene glycol, oxyCODONE HCl    History of Present Illness on 7/4/2024:    54 y.o. yo male with PMH of ESRD, CAD status post PCI, CHF, COPD, HTN, DM, CVA and HTN, PAD status post iliac stents who is admitted for chest pain  Patient is over 5 kg from his target weight and nephrology has been asked to see him for dialysis needs  Given his cardiac history, cardiology was consulted and they are planning on stress test and echocardiogram in the morning    Physical exam:   Constitutional:  VITALS:  /62   Pulse 84   Temp 98.1 °F (36.7 °C) (Oral)   Resp 18   Ht 1.753 m (5' 9\")   Wt 102.4 kg (225 lb 12 oz)   SpO2 100%   BMI 33.34 kg/m²   Gen: alert,

## 2024-07-05 NOTE — FLOWSHEET NOTE
07/05/24 0848   Vital Signs   Temp 97.8 °F (36.6 °C)   Temp Source Oral   Pulse 68   Heart Rate Source Monitor   Respirations 12   /61   MAP (Calculated) 80   BP Location Right upper arm   BP Method Automatic   Patient Position Semi fowlers   Pain Assessment   Pain Assessment 0-10   Pain Level 8   Patient's Stated Pain Goal 7   Pain Location Back   Pain Orientation Lower   Pain Descriptors Aching   Functional Pain Assessment Prevents or interferes some active activities and ADLs   Pain Type Chronic pain   Pain Frequency Continuous   Non-Pharmaceutical Pain Intervention(s) Rest;Repositioned   Oxygen Therapy   SpO2 100 %   O2 Device PAP (positive airway pressure)     AM assessment complete. Pt a&o x4. Pt was currently on Bipap when this writer entered but then placed pt on 4L which is his baseline for transport to Stress lab. No edema noted. Vasc cath to L chest. Dressing looks wnl and no s/s of infection noted. Denies any CP. Call light and bedside table within reach. Will continue to monitor.

## 2024-07-05 NOTE — NURSING NOTE
A Lexiscan stress test was completed on this patient as ordered. Aminophylline 100mg IVP given for increased for SOB and chest pressure, symptoms resolved. The patient tolerated the procedure well.  Awaiting stress imaging at this time.

## 2024-07-05 NOTE — PROGRESS NOTES
Physical Therapy    Orders received and chart reviewed. Pt is off the floor for test. Will continue to follow. Re-attempt as appropriate and schedule permits.     Ciro Purcell, PT, DPT   Becca Valladares, OTR/L [id number]

## 2024-07-05 NOTE — DISCHARGE INSTRUCTIONS
Heart Failure Resources:  Heart Failure Interactive Workbook:  Go to https://[employer]/publication/?u=167922 for a Free Heart Failure Interactive Workbook provided by The American Heart Association. This interactive workbook will provide information on Healthier Living with Heart Failure. Please copy and paste link into search bar. Use your mouse to scroll through the pages.    HF [place] stephanie:   Heart Failure Free smart phone stephanie available for iPhone and Android download. Use your phone to track sodium intake, fluid intake, symptoms, and weight.     Low Sodium Diet / Recipes:  Go to www.[employer] website for “renal” diet which is Low Sodium! [employer] is a dialysis company, but this website offers free seasonal cookbooks. Each quarter, they will release 25-30 new recipes with a breakdown of calories, sodium, and glucose. You can also go to www[employer]/recipes website for free recipes.     Discharge Instruction Video:  Scan the QR code below with your camera and click the canva.com link to open the video and watch educational information on Heart Failure and Medications from one of our nurses.   https://www.[employer]/design/DAFZnsH_JRk/2FbincwEOUHpgYZboH3fhd/edit    Home Exercise Program:   Identification of Green/Yellow/Red zones:  You should be able to identify when you feel good (green zone), if you have 1-2 symptoms of HF (yellow zone), or if you are in need of medical attention (red zone).  In your CHF education folder you were provided a “stop light tool” to outline this information.     We want to you to rate your exertion levels:    Our therapy team has discussed means of identification with you such as the \"John scale.\"  The John rating scale ranges from 6 to 20, where 6 means \"no exertion at all\" and 20 means \"maximal exertion.\" The goal is to use this to gauge how much effort it is taking for you to do your normal daily tasks.   You should be able to recognize when too much exertion is

## 2024-07-06 LAB
ALBUMIN SERPL-MCNC: 3.3 G/DL (ref 3.4–5)
ALBUMIN/GLOB SERPL: 0.9 {RATIO} (ref 1.1–2.2)
ALP SERPL-CCNC: 150 U/L (ref 40–129)
ALT SERPL-CCNC: 12 U/L (ref 10–40)
ANION GAP SERPL CALCULATED.3IONS-SCNC: 11 MMOL/L (ref 3–16)
AST SERPL-CCNC: 10 U/L (ref 15–37)
BASOPHILS # BLD: 0.1 K/UL (ref 0–0.2)
BASOPHILS NFR BLD: 1.1 %
BILIRUB SERPL-MCNC: <0.2 MG/DL (ref 0–1)
BUN SERPL-MCNC: 77 MG/DL (ref 7–20)
CALCIUM SERPL-MCNC: 10 MG/DL (ref 8.3–10.6)
CHLORIDE SERPL-SCNC: 91 MMOL/L (ref 99–110)
CO2 SERPL-SCNC: 28 MMOL/L (ref 21–32)
CREAT SERPL-MCNC: 6.3 MG/DL (ref 0.9–1.3)
DEPRECATED RDW RBC AUTO: 17.2 % (ref 12.4–15.4)
EOSINOPHIL # BLD: 0.4 K/UL (ref 0–0.6)
EOSINOPHIL NFR BLD: 5.8 %
GFR SERPLBLD CREATININE-BSD FMLA CKD-EPI: 10 ML/MIN/{1.73_M2}
GLUCOSE BLD-MCNC: 145 MG/DL (ref 70–99)
GLUCOSE BLD-MCNC: 149 MG/DL (ref 70–99)
GLUCOSE BLD-MCNC: 202 MG/DL (ref 70–99)
GLUCOSE BLD-MCNC: 271 MG/DL (ref 70–99)
GLUCOSE SERPL-MCNC: 186 MG/DL (ref 70–99)
HCT VFR BLD AUTO: 27.6 % (ref 40.5–52.5)
HGB BLD-MCNC: 9 G/DL (ref 13.5–17.5)
LYMPHOCYTES # BLD: 1 K/UL (ref 1–5.1)
LYMPHOCYTES NFR BLD: 16.2 %
MCH RBC QN AUTO: 26.8 PG (ref 26–34)
MCHC RBC AUTO-ENTMCNC: 32.6 G/DL (ref 31–36)
MCV RBC AUTO: 82.2 FL (ref 80–100)
MONOCYTES # BLD: 0.7 K/UL (ref 0–1.3)
MONOCYTES NFR BLD: 11.5 %
NEUTROPHILS # BLD: 4 K/UL (ref 1.7–7.7)
NEUTROPHILS NFR BLD: 65.4 %
PERFORMED ON: ABNORMAL
PLATELET # BLD AUTO: 240 K/UL (ref 135–450)
PMV BLD AUTO: 8 FL (ref 5–10.5)
POTASSIUM SERPL-SCNC: 5.3 MMOL/L (ref 3.5–5.1)
PROT SERPL-MCNC: 7 G/DL (ref 6.4–8.2)
RBC # BLD AUTO: 3.36 M/UL (ref 4.2–5.9)
SODIUM SERPL-SCNC: 130 MMOL/L (ref 136–145)
WBC # BLD AUTO: 6.1 K/UL (ref 4–11)

## 2024-07-06 PROCEDURE — 6370000000 HC RX 637 (ALT 250 FOR IP)

## 2024-07-06 PROCEDURE — 99233 SBSQ HOSP IP/OBS HIGH 50: CPT | Performed by: INTERNAL MEDICINE

## 2024-07-06 PROCEDURE — 6370000000 HC RX 637 (ALT 250 FOR IP): Performed by: INTERNAL MEDICINE

## 2024-07-06 PROCEDURE — 94761 N-INVAS EAR/PLS OXIMETRY MLT: CPT

## 2024-07-06 PROCEDURE — 90935 HEMODIALYSIS ONE EVALUATION: CPT

## 2024-07-06 PROCEDURE — 99232 SBSQ HOSP IP/OBS MODERATE 35: CPT | Performed by: INTERNAL MEDICINE

## 2024-07-06 PROCEDURE — 2500000003 HC RX 250 WO HCPCS: Performed by: INTERNAL MEDICINE

## 2024-07-06 PROCEDURE — 85025 COMPLETE CBC W/AUTO DIFF WBC: CPT

## 2024-07-06 PROCEDURE — 1200000000 HC SEMI PRIVATE

## 2024-07-06 PROCEDURE — 36415 COLL VENOUS BLD VENIPUNCTURE: CPT

## 2024-07-06 PROCEDURE — 80053 COMPREHEN METABOLIC PANEL: CPT

## 2024-07-06 PROCEDURE — 02HV33Z INSERTION OF INFUSION DEVICE INTO SUPERIOR VENA CAVA, PERCUTANEOUS APPROACH: ICD-10-PCS | Performed by: INTERNAL MEDICINE

## 2024-07-06 PROCEDURE — 2700000000 HC OXYGEN THERAPY PER DAY

## 2024-07-06 PROCEDURE — 2580000003 HC RX 258: Performed by: INTERNAL MEDICINE

## 2024-07-06 PROCEDURE — 6360000002 HC RX W HCPCS: Performed by: INTERNAL MEDICINE

## 2024-07-06 RX ORDER — CALCIUM CARBONATE 500 MG/1
500 TABLET, CHEWABLE ORAL 3 TIMES DAILY PRN
Status: DISCONTINUED | OUTPATIENT
Start: 2024-07-06 | End: 2024-07-09 | Stop reason: HOSPADM

## 2024-07-06 RX ORDER — HEPARIN SODIUM 1000 [USP'U]/ML
3600 INJECTION, SOLUTION INTRAVENOUS; SUBCUTANEOUS PRN
Status: DISCONTINUED | OUTPATIENT
Start: 2024-07-06 | End: 2024-07-09 | Stop reason: HOSPADM

## 2024-07-06 RX ADMIN — INSULIN GLARGINE 15 UNITS: 100 INJECTION, SOLUTION SUBCUTANEOUS at 20:05

## 2024-07-06 RX ADMIN — EPOETIN ALFA-EPBX 10000 UNITS: 10000 INJECTION, SOLUTION INTRAVENOUS; SUBCUTANEOUS at 07:53

## 2024-07-06 RX ADMIN — QUETIAPINE FUMARATE 50 MG: 25 TABLET ORAL at 19:57

## 2024-07-06 RX ADMIN — HEPARIN SODIUM 3600 UNITS: 1000 INJECTION INTRAVENOUS; SUBCUTANEOUS at 09:07

## 2024-07-06 RX ADMIN — SODIUM CHLORIDE, PRESERVATIVE FREE 10 ML: 5 INJECTION INTRAVENOUS at 12:20

## 2024-07-06 RX ADMIN — CALCIUM CARBONATE 500 MG: 500 TABLET, CHEWABLE ORAL at 18:40

## 2024-07-06 RX ADMIN — ASPIRIN 81 MG: 81 TABLET, CHEWABLE ORAL at 09:10

## 2024-07-06 RX ADMIN — BUSPIRONE HYDROCHLORIDE 15 MG: 7.5 TABLET ORAL at 12:19

## 2024-07-06 RX ADMIN — CALCIUM ACETATE 1334 MG: 667 CAPSULE ORAL at 17:52

## 2024-07-06 RX ADMIN — ATORVASTATIN CALCIUM 80 MG: 40 TABLET, FILM COATED ORAL at 19:57

## 2024-07-06 RX ADMIN — TRAZODONE HYDROCHLORIDE 50 MG: 50 TABLET ORAL at 19:57

## 2024-07-06 RX ADMIN — DULOXETINE HYDROCHLORIDE 60 MG: 60 CAPSULE, DELAYED RELEASE ORAL at 09:10

## 2024-07-06 RX ADMIN — SACUBITRIL AND VALSARTAN 1 TABLET: 24; 26 TABLET, FILM COATED ORAL at 19:57

## 2024-07-06 RX ADMIN — BUSPIRONE HYDROCHLORIDE 15 MG: 7.5 TABLET ORAL at 19:57

## 2024-07-06 RX ADMIN — BUSPIRONE HYDROCHLORIDE 15 MG: 7.5 TABLET ORAL at 09:10

## 2024-07-06 RX ADMIN — INSULIN LISPRO 1 UNITS: 100 INJECTION, SOLUTION INTRAVENOUS; SUBCUTANEOUS at 17:52

## 2024-07-06 RX ADMIN — CALCIUM ACETATE 1334 MG: 667 CAPSULE ORAL at 12:19

## 2024-07-06 RX ADMIN — OXYCODONE 10 MG: 5 TABLET ORAL at 20:01

## 2024-07-06 RX ADMIN — SODIUM CHLORIDE, PRESERVATIVE FREE 5 ML: 5 INJECTION INTRAVENOUS at 20:00

## 2024-07-06 RX ADMIN — VENLAFAXINE HYDROCHLORIDE 75 MG: 75 CAPSULE, EXTENDED RELEASE ORAL at 09:10

## 2024-07-06 ASSESSMENT — PAIN SCALES - GENERAL: PAINLEVEL_OUTOF10: 1

## 2024-07-06 NOTE — PROGRESS NOTES
HEART FAILURE CARE PLAN:    Comorbidities Reviewed: Yes   Patient has a past medical history of Ambulatory dysfunction, Aortic stenosis, Arthritis, Asthma, Bilateral hilar adenopathy syndrome, CAD (coronary artery disease), Cardiomyopathy ([facility]), CHF (congestive heart failure) ([facility]), Chronic pain, COPD (chronic obstructive pulmonary disease) ([facility]), CVA (cerebral vascular accident) ([facility]), Depression, Diabetes mellitus ([facility]), Difficult intravenous access, Emphysema of lung ([facility]), ESRD (end stage renal disease) on dialysis ([facility]), Fear of needles, Gastric ulcer, GERD (gastroesophageal reflux disease), Heart valve problem, Hemodialysis patient ([facility]), History of spinal fracture, History of transcatheter aortic valve replacement (TAVR), Hx of blood clots, Hyperlipidemia, Hypertension, MI (myocardial infarction) ([facility]), Neuromuscular disorder ([facility]), Numbness and tingling in left arm, Pneumonia, PONV (postoperative nausea and vomiting), Prolonged emergence from general anesthesia, Sleep apnea, Stroke ([facility]), TIA (transient ischemic attack), and Unspecified diseases of blood and blood-forming organs.     Weights Reviewed: Yes   Admission weight: 100.7 kg (222 lb)   Wt Readings from Last 3 Encounters:   07/06/24 93 kg (205 lb 0.4 oz)   07/05/24 102.1 kg (225 lb)   05/24/24 100.7 kg (222 lb)     Intake & Output Reviewed: Yes     Intake/Output Summary (Last 24 hours) at 7/6/2024 1603  Last data filed at 7/6/2024 0850  Gross per 24 hour   Intake 222 ml   Output --   Net 222 ml       ECHOCARDIOGRAM Reviewed: Yes   Patient's Ejection Fraction (EF) is greater than 40%     Medications Reviewed: Yes   SCHEDULED HOSPITAL MEDICATIONS:   atorvastatin  80 mg Oral Nightly    epoetin siddharth-epbx  10,000 Units IntraVENous Once per day on Tue Thu Sat    insulin lispro  0-4 Units SubCUTAneous TID WC    insulin lispro  0-4 Units SubCUTAneous Nightly    sodium chloride flush  5-40 mL IntraVENous 2 times per day    aspirin  81 mg Oral Daily

## 2024-07-06 NOTE — PLAN OF CARE
Problem: Discharge Planning  Goal: Discharge to home or other facility with appropriate resources  Outcome: Progressing     Problem: Pain  Goal: Verbalizes/displays adequate comfort level or baseline comfort level  Outcome: Progressing     Problem: Safety - Adult  Goal: Free from fall injury  Outcome: Progressing     Problem: Chronic Conditions and Co-morbidities  Goal: Patient's chronic conditions and co-morbidity symptoms are monitored and maintained or improved  Outcome: Progressing     Problem: Cardiovascular - Adult  Goal: Maintains optimal cardiac output and hemodynamic stability  Outcome: Progressing  Goal: Absence of cardiac dysrhythmias or at baseline  Outcome: Progressing     Problem: Skin/Tissue Integrity  Goal: Absence of new skin breakdown  Description: 1.  Monitor for areas of redness and/or skin breakdown  2.  Assess vascular access sites hourly  3.  Every 4-6 hours minimum:  Change oxygen saturation probe site  4.  Every 4-6 hours:  If on nasal continuous positive airway pressure, respiratory therapy assess nares and determine need for appliance change or resting period.  Outcome: Progressing     Problem: Discharge Planning  Goal: Discharge to home or other facility with appropriate resources  Outcome: Progressing     Problem: Pain  Goal: Verbalizes/displays adequate comfort level or baseline comfort level  Outcome: Progressing     Problem: Safety - Adult  Goal: Free from fall injury  Outcome: Progressing     Problem: Chronic Conditions and Co-morbidities  Goal: Patient's chronic conditions and co-morbidity symptoms are monitored and maintained or improved  Outcome: Progressing     Problem: Cardiovascular - Adult  Goal: Maintains optimal cardiac output and hemodynamic stability  Outcome: Progressing  Goal: Absence of cardiac dysrhythmias or at baseline  Outcome: Progressing     Problem: Skin/Tissue Integrity  Goal: Absence of new skin breakdown  Description: 1.  Monitor for areas of redness and/or  skin breakdown  2.  Assess vascular access sites hourly  3.  Every 4-6 hours minimum:  Change oxygen saturation probe site  4.  Every 4-6 hours:  If on nasal continuous positive airway pressure, respiratory therapy assess nares and determine need for appliance change or resting period.  Outcome: Progressing

## 2024-07-06 NOTE — PROGRESS NOTES
CARDIOLOGY PROGRESS NOTE      Patient Name: Igor Ann  Date of admission: 7/3/2024  3:51 PM  Admission Dx: Chest pain of uncertain etiology [R07.9]  Reason for Consult:  chest pain, abnormal stress test, elevated troponin  Requesting Physician: Owen Willoughby MD  Primary Care physician: Vonnie Cleveland APRN - NP    Subjective:     Igor Ann is a 54 y.o. man with severe CAD s/p PCI with multiple stents, HFrEF, PVD s/p bilateral iliac stents, ESRD, COPD, HTN, HLP, DM2, and morbid obesity admitted for SOB and chest pain.      Admitted 7/4 with cc of chest pain with radiation to left arm. Improved with NTG. Recent left heart catheterization 5/2023 with PATRICIA to RI, Lcx and RCA 2/2 severe ISR.EKG NSR with T wave ivnersions inferolateral leads. HS trop >100 with flat trend. Stress test this admission abnormal suggesting anterior and apical ischemia as well as mixed ischemia/scar inferior wall. EF 37%. High Risk. Echo showed LVEF 45-50%.     Patient is evaluated in dialysis this morning.  BP is low-normal.  Tolerating.  He has not had chest pressure today but he endorses having episode of chest pressure yesterday.  States he was sitting in room at time.  Tolerated dialysis yesterday.  No palpitations.  Lying relatively flat on 4 L, home oxygen therapy, no distress.  No edema.  No palpitations.    Home Medications:  Were reviewed and are listed in nursing record and/or below  Prior to Admission medications    Medication Sig Start Date End Date Taking? Authorizing Provider   metoprolol tartrate (LOPRESSOR) 25 MG tablet Take 1 tablet by mouth 2 times daily 5/1/24  Yes Gerson Velazquez MD   busPIRone (BUSPAR) 15 MG tablet Take 15 mg by mouth Daily 6/4/24  Yes Gerson Velazquez MD   LORazepam (ATIVAN) 0.5 MG tablet Take 1 tablet by mouth every 6 hours as needed.    Gerson Velazquez MD   midodrine (PROAMATINE) 10 MG tablet Take 0.5 tablets by mouth every 6 hours as needed    Provider  MD Gerson   oxyCODONE HCl (OXY-IR) 10 MG immediate release tablet Take 1 tablet by mouth every 6 hours as needed for Pain (surgical wound to r foot). Max Daily Amount: 40 mg    Gerson Velazquez MD   pregabalin (LYRICA) 25 MG capsule Take 1 capsule by mouth Daily.    Gerson Velazquez MD   venlafaxine (EFFEXOR XR) 75 MG extended release capsule Take 1 capsule by mouth daily    Gerson Velazquez MD   sodium zirconium cyclosilicate (LOKELMA) 10 g PACK oral suspension Take 1 packet by mouth daily as needed    Gerson Velazquez MD   neomycin-bacitracin-polymyxin (NEOSPORIN) 5-400-5000 ointment Apply topically 4 times daily. 5/11/24   Josie Barrios APRN - CNP   metoprolol succinate (TOPROL XL) 25 MG extended release tablet Take 1 tablet by mouth daily 1/12/24   Efren Lawrence MD   sacubitril-valsartan (ENTRESTO) 24-26 MG per tablet Take 1 tablet by mouth 2 times daily 1/12/24   Efren Lawrence MD   insulin glargine (LANTUS) 100 UNIT/ML injection vial Inject 15 Units into the skin nightly 1/6/24   Gómez Hwang MD   busPIRone (BUSPAR) 10 MG tablet Take 1 tablet by mouth 3 times daily 1/6/24   Gómez Hwang MD   apixaban (ELIQUIS) 5 MG TABS tablet Take 1 tablet by mouth 2 times daily    Gerson Velazquez MD   traZODone (DESYREL) 50 MG tablet Take 1 tablet by mouth daily 4/16/23   Gerson Velazquez MD   DULoxetine (CYMBALTA) 60 MG extended release capsule Take 1 capsule by mouth daily 3/17/23   Jackie Krause MD   pravastatin (PRAVACHOL) 40 MG tablet TAKE 1 TABLET BY MOUTH DAILY 2/7/23   Colleen Herrera APRN - CNP   QUEtiapine (SEROQUEL) 50 MG tablet TAKE 1 TABLET BY MOUTH EVERY EVENING 6/30/22   Salvador Lugo MD   Calcium Acetate, Phos Binder, 667 MG CAPS TAKE 1 CAPSULE BY MOUTH THREE TIMES DAILY WITH MEALS  Patient taking differently: Take 2 capsules by mouth 3 times daily 8/12/21   MYESHA'Salvador Mcrae MD   ipratropium-albuterol (DUONEB)

## 2024-07-06 NOTE — FLOWSHEET NOTE
07/06/24 0445   Vital Signs   Temp 98.4 °F (36.9 °C)   Temp Source Oral   Pulse 88   Heart Rate Source Monitor   Respirations 16   /61   MAP (Calculated) 80   BP Location Right upper arm   BP Method Automatic   Patient Position Semi fowlers   Oxygen Therapy   SpO2 99 %   O2 Device Nasal cannula   O2 Flow Rate (L/min) 4 L/min   Height and Weight   Weight - Scale 102.2 kg (225 lb 3.2 oz)   BMI (Calculated) 33.3     Pt resting in bed with eyes closed chest rise and fall noted,  call light within  reach.

## 2024-07-06 NOTE — FLOWSHEET NOTE
07/06/24 0723 07/06/24 1104   Vital Signs   BP (!) 101/50 (!) 90/36   Temp 97.6 °F (36.4 °C) 97.6 °F (36.4 °C)   Pulse 71 80   Respirations 16 16       Treatment time: 3.5 hours  Net UF: 1 L    Pre weight: 94 kg (standing scale)  Post weight: 93 kg (standing scale)  EDW: 94 kg (outpt clinic)    Access used: [facility] HD tunneled cath  Access function: No problems    Medications or blood products given: Retacrit 17882 units IVP.    Regular outpatient schedule: Dee HERNÁNDEZ    Summary of response to treatment: Tolerated 3.5 hour HD tx with soft BP throughout tx, SBP 's.      Crit line: H1=26.9, H2=26.5, difference of .4, no refill present.  Ending profile C.    Copy of dialysis treatment record placed in chart, to be scanned into EMR.    Report called to Sue Jones RN

## 2024-07-06 NOTE — PROGRESS NOTES
Physical Therapy    Orders received and chart reviewed. Pt is off the floor for HD. Will continue to follow. Re-attempt as appropriate and schedule permits.     Ciro Purcell, PT, DPT   Ale Smiley, OTR/L #[id number]

## 2024-07-06 NOTE — PROGRESS NOTES
Progress Note    Admit Date:  7/3/2024    Presented for CP   Hx of CAD,VTE, ESRD On HD, Systolic HF     Subjective:  Mr. Ann today seen resting in bed   Cath planned for monday    Objective:   Patient Vitals for the past 4 hrs:   BP Temp Pulse Resp Weight   07/06/24 0723 (!) 101/50 97.6 °F (36.4 °C) 71 16 94 kg (207 lb 3.7 oz)          Intake/Output Summary (Last 24 hours) at 7/6/2024 0912  Last data filed at 7/6/2024 0850  Gross per 24 hour   Intake 442 ml   Output 25 ml   Net 417 ml         Physical Exam:    Gen: No distress. Alert.   Eyes: PERRL. No sclera icterus. No conjunctival injection.   Neck: No JVD.  Trachea midline.  Resp: No accessory muscle use. No crackles. No wheezes. No rhonchi. +Diminished breath sounds   CV: Regular rate. Regular rhythm. No murmur.  No rub. No edema.   Peripheral Pulses: +2 palpable, equal bilaterally   GI: Non-tender. Non-distended.  Normal bowel sounds.  Skin: Warm and dry. No nodule on exposed extremities. No rash on exposed extremities. 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 No joint deformity. No clubbing.   Neuro: Awake. Grossly nonfocal    Psych: Oriented. No anxiety or agitation.           Medications:  atorvastatin, 80 mg, Nightly  epoetin siddharth-epbx, 10,000 Units, Once per day on Tue Thu Sat  insulin lispro, 0-4 Units, TID WC  insulin lispro, 0-4 Units, Nightly  sodium chloride flush, 5-40 mL, 2 times per day  aspirin, 81 mg, Daily  busPIRone, 15 mg, TID  calcium acetate, 2 capsule, TID WC  insulin glargine, 15 Units, Nightly  DULoxetine, 60 mg, Daily  metoprolol succinate, 25 mg, Daily  QUEtiapine, 50 mg, Nightly  sacubitril-valsartan, 1 tablet, BID  venlafaxine, 75 mg, Daily  traZODone, 50 mg, Nightly      PRN Medications:  heparin (porcine), 3,600 Units, PRN  ipratropium 0.5 mg-albuterol 2.5 mg, 1 Dose, Q4H PRN  LORazepam, 0.5 mg, Q6H PRN  midodrine, 5 mg, Q6H PRN  glucose, 4 tablet, PRN  dextrose  Sheet:  https://www.fda.gov/media/416044/download  Provider Fact Sheet: https://www.fda.gov/media/839533/download  EUA: https://www.fda.gov/media/466599/download  IFU: https://www.fda.gov/media/848452/download    Methodology:  RT-PCR          Influenza A NOT DETECTED     Influenza B NOT DETECTED               RADIOLOGY  XR CHEST PORTABLE   Final Result   Moderate left and small right pleural effusions with bibasilar airspace   disease.  This could represent a combination of pneumonia and atelectasis.               Assessment/Plan:    #Chest pain   #Hx of CAD   #EKG changes with new T wave inversions  -elevated troponin but it is flat   -CP resolved with nitro   -on ASA, statin, BB   -cardiology consulted   ECHO   Nuclear stress test abnormal, cath planned for Monday     #Bilateral pleural effusions   -fluid removal per dialysis   -stable on baseline oxygen  -denies s/s of pneumonia     #Chronic systolic CHF   -EF 35-45%, last echo from 3/27/24  -daily weights, intake and output  -continue BB, entresto   -cardiology consulted     #Elevated troponin   -113-->112-->111  -could be 2/2 to renal disease     #ESRD on HD   -HD MWF   -on phoslo  -renal diet   -nephrology consulted    #Chronic hypoxic respiratory failure   #COPD without AE   -stable on baseline 4 L O2   -continue prn inhalers     #Chronic normocytic anemia   -monitor CBC   -denies s/s of bleeding   -likely 2/2 to CKD   -gets retacrit    #Chronic osteomyelitis   -s/p right foot I&D with MT bone resection and graft application  -follows with podiatry and ID outpatient , completed IV antibiotics  -wound care consulted     #DM type 2   -lantus  -SSI   -monitor BG     #HTN   -on metoprolol and entresto     #Hx of CVA  -eliquis, ASA,statin     #Hx of VTE  -on eliquis     #Depression   #Anxiety   -on buspar, cymbalta     #Chronic pain   -on oxycodone     #ZAINAB   -CPAP nightly     DVT Prophylaxis: Eliquis - on hold currently  Diet: ADULT DIET; Regular; Low Potassium

## 2024-07-06 NOTE — PLAN OF CARE
Problem: Discharge Planning  Goal: Discharge to home or other facility with appropriate resources  7/6/2024 0633 by Crystal Christopher RN  Outcome: Progressing  7/5/2024 2238 by Crystal Christopher RN  Outcome: Progressing     Problem: Pain  Goal: Verbalizes/displays adequate comfort level or baseline comfort level  7/6/2024 0633 by Crystal Christopher RN  Outcome: Progressing  7/5/2024 2238 by Crystal Christopher RN  Outcome: Progressing     Problem: Safety - Adult  Goal: Free from fall injury  7/6/2024 0633 by Crystal Christopher RN  Outcome: Progressing  7/5/2024 2238 by Crystal Christopher RN  Outcome: Progressing     Problem: Chronic Conditions and Co-morbidities  Goal: Patient's chronic conditions and co-morbidity symptoms are monitored and maintained or improved  7/6/2024 0633 by Crystal Christopher RN  Outcome: Progressing  7/5/2024 2238 by Crystal Christopher RN  Outcome: Progressing     Problem: Cardiovascular - Adult  Goal: Maintains optimal cardiac output and hemodynamic stability  7/6/2024 0633 by Crystal Christopher RN  Outcome: Progressing  7/5/2024 2238 by Crystal Christopher RN  Outcome: Progressing  Goal: Absence of cardiac dysrhythmias or at baseline  7/6/2024 0633 by Crystal Christopher RN  Outcome: Progressing  7/5/2024 2238 by Crystal Christopher RN  Outcome: Progressing     Problem: Skin/Tissue Integrity  Goal: Absence of new skin breakdown  Description: 1.  Monitor for areas of redness and/or skin breakdown  2.  Assess vascular access sites hourly  3.  Every 4-6 hours minimum:  Change oxygen saturation probe site  4.  Every 4-6 hours:  If on nasal continuous positive airway pressure, respiratory therapy assess nares and determine need for appliance change or resting period.  7/6/2024 0633 by Crystal Christopher RN  Outcome:  Progressing  7/5/2024 2238 by Crystal Christopher, RN  Outcome: Progressing

## 2024-07-06 NOTE — PROGRESS NOTES
HEART FAILURE CARE PLAN:    Comorbidities Reviewed: Yes   Patient has a past medical history of Ambulatory dysfunction, Aortic stenosis, Arthritis, Asthma, Bilateral hilar adenopathy syndrome, CAD (coronary artery disease), Cardiomyopathy ([facility]), CHF (congestive heart failure) ([facility]), Chronic pain, COPD (chronic obstructive pulmonary disease) ([facility]), CVA (cerebral vascular accident) ([facility]), Depression, Diabetes mellitus ([facility]), Difficult intravenous access, Emphysema of lung ([facility]), ESRD (end stage renal disease) on dialysis ([facility]), Fear of needles, Gastric ulcer, GERD (gastroesophageal reflux disease), Heart valve problem, Hemodialysis patient ([facility]), History of spinal fracture, History of transcatheter aortic valve replacement (TAVR), Hx of blood clots, Hyperlipidemia, Hypertension, MI (myocardial infarction) ([facility]), Neuromuscular disorder ([facility]), Numbness and tingling in left arm, Pneumonia, PONV (postoperative nausea and vomiting), Prolonged emergence from general anesthesia, Sleep apnea, Stroke ([facility]), TIA (transient ischemic attack), and Unspecified diseases of blood and blood-forming organs.     Weights Reviewed: Yes   Admission weight: 100.7 kg (222 lb)   Wt Readings from Last 3 Encounters:   07/06/24 102.2 kg (225 lb 3.2 oz)   07/05/24 102.1 kg (225 lb)   05/24/24 100.7 kg (222 lb)     Intake & Output Reviewed: Yes     Intake/Output Summary (Last 24 hours) at 7/6/2024 0634  Last data filed at 7/5/2024 1702  Gross per 24 hour   Intake 442 ml   Output 25 ml   Net 417 ml       ECHOCARDIOGRAM Reviewed: Yes   Patient's Ejection Fraction (EF) is greater than 40%     Medications Reviewed: Yes   SCHEDULED HOSPITAL MEDICATIONS:   atorvastatin  80 mg Oral Nightly    epoetin siddharth-epbx  10,000 Units IntraVENous Once per day on Tue Thu Sat    insulin lispro  0-4 Units SubCUTAneous TID WC    insulin lispro  0-4 Units SubCUTAneous Nightly    sodium chloride flush  5-40 mL IntraVENous 2 times per day    aspirin  81 mg Oral Daily

## 2024-07-06 NOTE — PROGRESS NOTES
Handoff report given to Crystal MORFIN.  Patient is in stable condition and has no needs at this time. Call light is in reach and bed is in the lowest position.  Care is transferred at this time.

## 2024-07-07 PROBLEM — K92.0 DARK EMESIS: Status: ACTIVE | Noted: 2024-07-07

## 2024-07-07 LAB
BASOPHILS # BLD: 0.1 K/UL (ref 0–0.2)
BASOPHILS NFR BLD: 0.7 %
DEPRECATED RDW RBC AUTO: 17 % (ref 12.4–15.4)
EKG ATRIAL RATE: 91 BPM
EKG DIAGNOSIS: NORMAL
EKG P AXIS: 74 DEGREES
EKG P-R INTERVAL: 176 MS
EKG Q-T INTERVAL: 354 MS
EKG QRS DURATION: 88 MS
EKG QTC CALCULATION (BAZETT): 435 MS
EKG R AXIS: 108 DEGREES
EKG T AXIS: -48 DEGREES
EKG VENTRICULAR RATE: 91 BPM
EOSINOPHIL # BLD: 0.4 K/UL (ref 0–0.6)
EOSINOPHIL NFR BLD: 4.2 %
GLUCOSE BLD-MCNC: 168 MG/DL (ref 70–99)
GLUCOSE BLD-MCNC: 192 MG/DL (ref 70–99)
GLUCOSE BLD-MCNC: 229 MG/DL (ref 70–99)
GLUCOSE BLD-MCNC: 232 MG/DL (ref 70–99)
HCT VFR BLD AUTO: 27.7 % (ref 40.5–52.5)
HCT VFR BLD AUTO: 28.7 % (ref 40.5–52.5)
HCT VFR BLD AUTO: 29 % (ref 40.5–52.5)
HCT VFR BLD AUTO: 30.4 % (ref 40.5–52.5)
HGB BLD-MCNC: 8.8 G/DL (ref 13.5–17.5)
HGB BLD-MCNC: 9 G/DL (ref 13.5–17.5)
HGB BLD-MCNC: 9.5 G/DL (ref 13.5–17.5)
HGB BLD-MCNC: 9.7 G/DL (ref 13.5–17.5)
LYMPHOCYTES # BLD: 0.7 K/UL (ref 1–5.1)
LYMPHOCYTES NFR BLD: 8.4 %
MCH RBC QN AUTO: 27 PG (ref 26–34)
MCHC RBC AUTO-ENTMCNC: 32.8 G/DL (ref 31–36)
MCV RBC AUTO: 82.3 FL (ref 80–100)
MONOCYTES # BLD: 0.6 K/UL (ref 0–1.3)
MONOCYTES NFR BLD: 7.3 %
NEUTROPHILS # BLD: 6.6 K/UL (ref 1.7–7.7)
NEUTROPHILS NFR BLD: 79.4 %
PERFORMED ON: ABNORMAL
PLATELET # BLD AUTO: 277 K/UL (ref 135–450)
PMV BLD AUTO: 7.9 FL (ref 5–10.5)
RBC # BLD AUTO: 3.52 M/UL (ref 4.2–5.9)
WBC # BLD AUTO: 8.4 K/UL (ref 4–11)

## 2024-07-07 PROCEDURE — 6360000002 HC RX W HCPCS: Performed by: INTERNAL MEDICINE

## 2024-07-07 PROCEDURE — 2700000000 HC OXYGEN THERAPY PER DAY

## 2024-07-07 PROCEDURE — 2500000003 HC RX 250 WO HCPCS: Performed by: INTERNAL MEDICINE

## 2024-07-07 PROCEDURE — 93005 ELECTROCARDIOGRAM TRACING: CPT | Performed by: INTERNAL MEDICINE

## 2024-07-07 PROCEDURE — 36415 COLL VENOUS BLD VENIPUNCTURE: CPT

## 2024-07-07 PROCEDURE — 99233 SBSQ HOSP IP/OBS HIGH 50: CPT | Performed by: INTERNAL MEDICINE

## 2024-07-07 PROCEDURE — 1200000000 HC SEMI PRIVATE

## 2024-07-07 PROCEDURE — 2580000003 HC RX 258: Performed by: INTERNAL MEDICINE

## 2024-07-07 PROCEDURE — 93010 ELECTROCARDIOGRAM REPORT: CPT | Performed by: INTERNAL MEDICINE

## 2024-07-07 PROCEDURE — 94761 N-INVAS EAR/PLS OXIMETRY MLT: CPT

## 2024-07-07 PROCEDURE — 6370000000 HC RX 637 (ALT 250 FOR IP): Performed by: INTERNAL MEDICINE

## 2024-07-07 PROCEDURE — 85018 HEMOGLOBIN: CPT

## 2024-07-07 PROCEDURE — 85014 HEMATOCRIT: CPT

## 2024-07-07 PROCEDURE — 85025 COMPLETE CBC W/AUTO DIFF WBC: CPT

## 2024-07-07 PROCEDURE — 94640 AIRWAY INHALATION TREATMENT: CPT

## 2024-07-07 PROCEDURE — 6370000000 HC RX 637 (ALT 250 FOR IP)

## 2024-07-07 RX ORDER — PANTOPRAZOLE SODIUM 40 MG/1
40 TABLET, DELAYED RELEASE ORAL
Status: DISCONTINUED | OUTPATIENT
Start: 2024-07-07 | End: 2024-07-07

## 2024-07-07 RX ORDER — INSULIN GLARGINE 100 [IU]/ML
10 INJECTION, SOLUTION SUBCUTANEOUS NIGHTLY
Status: DISCONTINUED | OUTPATIENT
Start: 2024-07-07 | End: 2024-07-09 | Stop reason: HOSPADM

## 2024-07-07 RX ADMIN — BUSPIRONE HYDROCHLORIDE 15 MG: 7.5 TABLET ORAL at 13:27

## 2024-07-07 RX ADMIN — TRAZODONE HYDROCHLORIDE 50 MG: 50 TABLET ORAL at 21:15

## 2024-07-07 RX ADMIN — SACUBITRIL AND VALSARTAN 1 TABLET: 24; 26 TABLET, FILM COATED ORAL at 21:16

## 2024-07-07 RX ADMIN — QUETIAPINE FUMARATE 50 MG: 25 TABLET ORAL at 21:15

## 2024-07-07 RX ADMIN — PANTOPRAZOLE SODIUM 40 MG: 40 INJECTION, POWDER, FOR SOLUTION INTRAVENOUS at 13:26

## 2024-07-07 RX ADMIN — PANTOPRAZOLE SODIUM 40 MG: 40 TABLET, DELAYED RELEASE ORAL at 05:34

## 2024-07-07 RX ADMIN — SODIUM CHLORIDE, PRESERVATIVE FREE 10 ML: 5 INJECTION INTRAVENOUS at 08:06

## 2024-07-07 RX ADMIN — ATORVASTATIN CALCIUM 80 MG: 40 TABLET, FILM COATED ORAL at 21:15

## 2024-07-07 RX ADMIN — LORAZEPAM 0.5 MG: 0.5 TABLET ORAL at 00:45

## 2024-07-07 RX ADMIN — INSULIN GLARGINE 10 UNITS: 100 INJECTION, SOLUTION SUBCUTANEOUS at 21:15

## 2024-07-07 RX ADMIN — PANTOPRAZOLE SODIUM 40 MG: 40 INJECTION, POWDER, FOR SOLUTION INTRAVENOUS at 21:16

## 2024-07-07 RX ADMIN — ONDANSETRON 4 MG: 2 INJECTION INTRAMUSCULAR; INTRAVENOUS at 00:45

## 2024-07-07 RX ADMIN — BUSPIRONE HYDROCHLORIDE 15 MG: 7.5 TABLET ORAL at 21:15

## 2024-07-07 RX ADMIN — CALCIUM ACETATE 1334 MG: 667 CAPSULE ORAL at 13:27

## 2024-07-07 RX ADMIN — SODIUM CHLORIDE, PRESERVATIVE FREE 10 ML: 5 INJECTION INTRAVENOUS at 21:16

## 2024-07-07 RX ADMIN — CALCIUM CARBONATE 500 MG: 500 TABLET, CHEWABLE ORAL at 06:37

## 2024-07-07 RX ADMIN — ONDANSETRON 4 MG: 2 INJECTION INTRAMUSCULAR; INTRAVENOUS at 05:32

## 2024-07-07 RX ADMIN — IPRATROPIUM BROMIDE AND ALBUTEROL SULFATE 1 DOSE: 2.5; .5 SOLUTION RESPIRATORY (INHALATION) at 13:13

## 2024-07-07 RX ADMIN — CALCIUM CARBONATE 500 MG: 500 TABLET, CHEWABLE ORAL at 00:45

## 2024-07-07 ASSESSMENT — PAIN DESCRIPTION - LOCATION: LOCATION: BACK

## 2024-07-07 ASSESSMENT — PAIN DESCRIPTION - PAIN TYPE: TYPE: CHRONIC PAIN

## 2024-07-07 ASSESSMENT — PAIN SCALES - GENERAL
PAINLEVEL_OUTOF10: 3
PAINLEVEL_OUTOF10: 3

## 2024-07-07 NOTE — PLAN OF CARE
Problem: Discharge Planning  Goal: Discharge to home or other facility with appropriate resources  Outcome: Progressing     Problem: Pain  Goal: Verbalizes/displays adequate comfort level or baseline comfort level  Outcome: Progressing     Problem: Safety - Adult  Goal: Free from fall injury  Outcome: Progressing     Problem: Chronic Conditions and Co-morbidities  Goal: Patient's chronic conditions and co-morbidity symptoms are monitored and maintained or improved  Outcome: Progressing     Problem: Cardiovascular - Adult  Goal: Maintains optimal cardiac output and hemodynamic stability  Outcome: Progressing  Goal: Absence of cardiac dysrhythmias or at baseline  Outcome: Progressing     Problem: Skin/Tissue Integrity  Goal: Absence of new skin breakdown  Description: 1.  Monitor for areas of redness and/or skin breakdown  2.  Assess vascular access sites hourly  3.  Every 4-6 hours minimum:  Change oxygen saturation probe site  4.  Every 4-6 hours:  If on nasal continuous positive airway pressure, respiratory therapy assess nares and determine need for appliance change or resting period.  Outcome: Progressing

## 2024-07-07 NOTE — PROGRESS NOTES
Physical Therapy    Orders received and chart reviewed. Hold per RN for cardiac cath on Monday. Will continue to follow. Re-attempt as appropriate and schedule permits.

## 2024-07-07 NOTE — PROGRESS NOTES
Shift assessment completed. Vital signs stable. Call light in reach and standard safety measures in place. Pt resting in bed.    Pt states he is too nauseous to take his morning meds. Provider aware.

## 2024-07-07 NOTE — FLOWSHEET NOTE
07/06/24 1900   Vital Signs   Temp 98.3 °F (36.8 °C)   Respirations 16   BP (!) 151/75   MAP (Calculated) 100   BP Location Right upper arm   BP Method Automatic   Patient Position Semi fowlers   Oxygen Therapy   SpO2 100 %   O2 Device Nasal cannula   O2 Flow Rate (L/min) 4 L/min     Pt c/o heartburn PRN Tums given earlier with relief. Pt is resting in bed with eytes closed at present . C/o back pain medication given per order

## 2024-07-07 NOTE — PROGRESS NOTES
Handoff report given to Jimi VALENTIN RN.  Patient is in stable condition and has no needs at this time. Call light is in reach and bed is in the lowest position.  Care is transferred at this time.

## 2024-07-07 NOTE — PROGRESS NOTES
CARDIOLOGY PROGRESS NOTE      Patient Name: Igor Ann  Date of admission: 7/3/2024  3:51 PM  Admission Dx: Chest pain of uncertain etiology [R07.9]  Reason for Consult:  chest pain, abnormal stress test, elevated troponin  Requesting Physician: Owen Willoughby MD  Primary Care physician: Vonnie Cleveland APRN - NP    Subjective:     Igor Ann is a 54 y.o. man with severe CAD s/p PCI with multiple stents, HFrEF, PVD s/p bilateral iliac stents, ESRD, COPD, HTN, HLP, DM2, and morbid obesity admitted for SOB and chest pain.      Admitted 7/4 with cc of chest pain with radiation to left arm. Improved with NTG. Recent left heart catheterization 5/2023 with PATRICIA to RI, Lcx and RCA 2/2 severe ISR. EKG NSR with T wave ivnersions inferolateral leads. HS trop >100 with flat trend. Stress test this admission abnormal suggesting anterior and apical ischemia as well as mixed ischemia/scar inferior wall. EF 37%. High Risk. Echo showed LVEF 45-50%.     Patient was evaluated yesterday in dialysis.  No complaints of chest discomfort.  Found to be lying flat on home 4 L oxygen therapy with no distress.  No changes made.    Reevaluated today. He has had a tough night. Nausea with frequent bouts emesis. He reports non-bloody but \"all black\" appearing. Denies abdominal pain at this time. It was hurting last evening. He felt chest pressure with vomiting episodes o/n as well. Hgb stable. Placed on PPI.     Home Medications:  Were reviewed and are listed in nursing record and/or below  Prior to Admission medications    Medication Sig Start Date End Date Taking? Authorizing Provider   metoprolol tartrate (LOPRESSOR) 25 MG tablet Take 1 tablet by mouth 2 times daily 5/1/24  Yes Gerson Velazquez MD   busPIRone (BUSPAR) 15 MG tablet Take 15 mg by mouth Daily 6/4/24  Yes Provider, MD Gerson   LORazepam (ATIVAN) 0.5 MG tablet Take 1 tablet by mouth every 6 hours as needed.    Provider, Historical, MD   midodrine  right ankle, closed, initial encounter    High anion gap metabolic acidosis    Acute on chronic left systolic heart failure (HCC)    Anxiety    Acute on chronic respiratory failure (HCC)    Non-compliance    Diabetic ketoacidosis without coma associated with diabetes mellitus due to underlying condition (HCC)    Abnormal chest x-ray    ZAINAB (obstructive sleep apnea)    Acute systolic CHF (congestive heart failure) (HCC)    Foot ulcer with necrosis of bone, left (HCC)    Right foot ulcer, with necrosis of bone (HCC)    Foot infection    Chest pain           I will address the patient's cardiac risk factors and adjusted pharmacologic treatment as needed. In addition, I have reinforced the need for patient directed risk factor modification.  All questions and concerns were addressed to the patient/family. Alternatives to my treatment were discussed.     Thank you for allowing us to participate in the care of Igor Ann. Please call me with any questions [phone].    Luciano Lema MD, [facility]  Cardiovascular Disease  [facility]  [phone] [place] Office  [phone] [place] Office  7/7/2024 8:05 AM

## 2024-07-07 NOTE — CONSULTS
55 yo male with severe CAD admitted with chest pain and planned left heart cath.  Reports dark stools and started on ppi.  Had some food this am.  NPO after midnight.  Will plan EGD tomorrow.

## 2024-07-07 NOTE — PROGRESS NOTES
[email or web address]  Nephrology Follow up Note           Reason for Consult: ESRD  Requesting Physician:  Dr. Lawrence    Sub/interval history  Patient complains of vomiting overnight, and heartburn  Is starting to feel better this afternoon    HD on 7/6 w 1l net UF, post wt wt 93 kg  HD on 7/4 w 4l net UF, post wt 94.5 kg     ROS: No chest pain/shortness of breath/fever/nausea/vomiting  PSFH: No visitor    Scheduled Meds:   pantoprazole (PROTONIX) 40 mg in sodium chloride (PF) 0.9 % 10 mL injection  40 mg IntraVENous BID    insulin glargine  10 Units SubCUTAneous Nightly    atorvastatin  80 mg Oral Nightly    epoetin siddharth-epbx  10,000 Units IntraVENous Once per day on Tue Thu Sat    insulin lispro  0-4 Units SubCUTAneous TID WC    insulin lispro  0-4 Units SubCUTAneous Nightly    sodium chloride flush  5-40 mL IntraVENous 2 times per day    aspirin  81 mg Oral Daily    busPIRone  15 mg Oral TID    calcium acetate  2 capsule Oral TID WC    DULoxetine  60 mg Oral Daily    metoprolol succinate  25 mg Oral Daily    QUEtiapine  50 mg Oral Nightly    sacubitril-valsartan  1 tablet Oral BID    venlafaxine  75 mg Oral Daily    traZODone  50 mg Oral Nightly     Continuous Infusions:   dextrose      sodium chloride       PRN Meds:.heparin (porcine), calcium carbonate, ipratropium 0.5 mg-albuterol 2.5 mg, LORazepam, midodrine, glucose, dextrose bolus **OR** dextrose bolus, glucagon (rDNA), dextrose, sodium chloride flush, sodium chloride, ondansetron **OR** ondansetron, acetaminophen **OR** acetaminophen, polyethylene glycol, oxyCODONE HCl    History of Present Illness on 7/4/2024:    54 y.o. yo male with PMH of ESRD, CAD status post PCI, CHF, COPD, HTN, DM, CVA and HTN, PAD status post iliac stents who is admitted for chest pain  Patient is over 5 kg from his target weight and nephrology has been asked to see him for dialysis needs  Given his cardiac history, cardiology was consulted and they are planning on stress test and  echocardiogram in the morning    Physical exam:   Constitutional:  VITALS:  BP (!) 150/84   Pulse 84   Temp 98.3 °F (36.8 °C) (Oral)   Resp 18   Ht 1.753 m (5' 9\")   Wt 95.8 kg (211 lb 3.2 oz)   SpO2 99%   BMI 31.19 kg/m²   Gen: alert, awake  Neck: No JVD  Skin: Unremarkable  Cardiovascular:  S1, S2 without m/r/g   Respiratory: CTA B without w/r/r; respiratory effort normal  Abdomen:  soft, nt, nd,   Extremities: no lower extremity edema  Neuro/Psy: AAoriented times 3 ; moves all 4 ext    Data/  Recent Labs     07/05/24  0507 07/06/24  0533 07/07/24  0451 07/07/24  1236   WBC 6.8 6.1 8.4  --    HGB 9.7* 9.0* 9.5* 9.7*   HCT 30.6* 27.6* 29.0* 30.4*   MCV 83.6 82.2 82.3  --     240 277  --        Recent Labs     07/05/24  0507 07/06/24  0534   * 130*   K 5.2* 5.3*   CL 94* 91*   CO2 25 28   GLUCOSE 112* 186*   BUN 51* 77*   CREATININE 5.0* 6.3*   LABGLOM 13* 10*         Assessment  -ESRD on HD Tuesday Thursday Saturday at [facility]  -Hyperkalemia  -Anemia  -CKD/MBD  -CHF with reduced EF  -History of CAD  -Chest pain per cardiology   Stress test high risk and LHC planned for 7/8  -Nausea vomiting with heartburn per primary team    Plan  -HD per TTS schedule   TW of 93 kg   -Retacrit 10K units w HD   -Renal diet  -Fluid restriction 1 L/day  -Serial labs    Thank you for the consultation.  Please do not hesitate to call with questions.    Cristine Mckenzie MD  Office: [phone]  Fax:    [phone]  [place]

## 2024-07-07 NOTE — PROGRESS NOTES
Progress Note    Admit Date:  7/3/2024    Presented for CP   Hx of CAD,VTE, ESRD On HD, Systolic HF     Subjective:  Mr. Ann today seen resting in bed   Had heartburn last night then had some emesis that was black appearing. No blood in stool however he says 2 weeks ago saw some blood after having difficulty passing a stool, but none since then.     Objective:   No data found.         Intake/Output Summary (Last 24 hours) at 7/7/2024 1144  Last data filed at 7/6/2024 1722  Gross per 24 hour   Intake 240 ml   Output --   Net 240 ml         Physical Exam:    Gen: No distress. Alert.   Eyes: PERRL. No sclera icterus. No conjunctival injection.   Neck: No JVD.  Trachea midline.  Resp: No accessory muscle use. No crackles. No wheezes. No rhonchi. +Diminished breath sounds   CV: Regular rate. Regular rhythm. No murmur.  No rub. No edema.   Peripheral Pulses: +2 palpable, equal bilaterally   GI: Non-tender. Non-distended.  Normal bowel sounds.  Skin: Warm and dry. No nodule on exposed extremities. No rash on exposed extremities. 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 No joint deformity. No clubbing.   Neuro: Awake. Grossly nonfocal    Psych: Oriented. No anxiety or agitation.           Medications:  pantoprazole, 40 mg, QAM AC  atorvastatin, 80 mg, Nightly  epoetin siddharth-epbx, 10,000 Units, Once per day on Tue Thu Sat  insulin lispro, 0-4 Units, TID WC  insulin lispro, 0-4 Units, Nightly  sodium chloride flush, 5-40 mL, 2 times per day  aspirin, 81 mg, Daily  busPIRone, 15 mg, TID  calcium acetate, 2 capsule, TID WC  insulin glargine, 15 Units, Nightly  DULoxetine, 60 mg, Daily  metoprolol succinate, 25 mg, Daily  QUEtiapine, 50 mg, Nightly  sacubitril-valsartan, 1 tablet, BID  venlafaxine, 75 mg, Daily  traZODone, 50 mg, Nightly      PRN Medications:  heparin (porcine), 3,600 Units, PRN  calcium carbonate, 500 mg, TID PRN  ipratropium 0.5 mg-albuterol 2.5  mg, 1 Dose, Q4H PRN  LORazepam, 0.5 mg, Q6H PRN  midodrine, 5 mg, Q6H PRN  glucose, 4 tablet, PRN  dextrose bolus, 125 mL, PRN   Or  dextrose bolus, 250 mL, PRN  glucagon (rDNA), 1 mg, PRN  dextrose, , Continuous PRN  sodium chloride flush, 5-40 mL, PRN  sodium chloride, , PRN  ondansetron, 4 mg, Q8H PRN   Or  ondansetron, 4 mg, Q6H PRN  acetaminophen, 650 mg, Q6H PRN   Or  acetaminophen, 650 mg, Q6H PRN  polyethylene glycol, 17 g, Daily PRN  oxyCODONE HCl, 10 mg, Q6H PRN          Data:  CBC:   Recent Labs     07/05/24  0507 07/06/24  0533 07/07/24  0451   WBC 6.8 6.1 8.4   HGB 9.7* 9.0* 9.5*   HCT 30.6* 27.6* 29.0*   MCV 83.6 82.2 82.3    240 277       BMP:   Recent Labs     07/05/24  0507 07/06/24  0534   * 130*   K 5.2* 5.3*   CL 94* 91*   CO2 25 28   BUN 51* 77*   CREATININE 5.0* 6.3*       LIVER PROFILE:   Recent Labs     07/05/24  0507 07/06/24  0534   AST 11* 10*   ALT 12 12   BILITOT <0.2 <0.2   ALKPHOS 155* 150*       PT/INR:   No results for input(s): \"PROTIME\", \"INR\" in the last 72 hours.      CULTURES  Results       Procedure Component Value Units Date/Time    COVID-19 & Influenza Combo [[id number]] Collected: 07/03/24 1633    Order Status: Completed Specimen: Nasopharyngeal Swab Updated: 07/03/24 1703     SARS-CoV-2 RNA, RT PCR NOT DETECTED     Comment: Not Detected results do not preclude SARS-CoV-2 infection and  should not be used as the sole basis for patient management  decisions.  Results must be combined with clinical observations,  patient history, and epidemiological information.  Testing was performed using CORINNE LOREN SARS-CoV-2 and Influenza A/B  nucleic acid assay. This test is a multiplex Real-Time Reverse  Transcriptase Polymerase Chain Reaction (RT-PCR)-based in vitro  diagnostic test intended for the qualitative detection of nucleic  acids from SARS-CoV-2, influenza A, and influenza B in nasopharyngeal  and nasal swab specimens for use under the FDA’s Emergency

## 2024-07-08 ENCOUNTER — ANESTHESIA (OUTPATIENT)
Dept: ENDOSCOPY | Age: 54
DRG: 280 | End: 2024-07-08
Payer: MEDICARE

## 2024-07-08 ENCOUNTER — ANESTHESIA EVENT (OUTPATIENT)
Dept: ENDOSCOPY | Age: 54
DRG: 280 | End: 2024-07-08
Payer: MEDICARE

## 2024-07-08 PROBLEM — R93.1 ABNORMAL NUCLEAR CARDIAC IMAGING TEST: Status: ACTIVE | Noted: 2024-01-15

## 2024-07-08 LAB
ALBUMIN SERPL-MCNC: 3.2 G/DL (ref 3.4–5)
ANION GAP SERPL CALCULATED.3IONS-SCNC: 14 MMOL/L (ref 3–16)
BUN SERPL-MCNC: 65 MG/DL (ref 7–20)
CALCIUM SERPL-MCNC: 10.1 MG/DL (ref 8.3–10.6)
CHLORIDE SERPL-SCNC: 94 MMOL/L (ref 99–110)
CO2 SERPL-SCNC: 25 MMOL/L (ref 21–32)
CREAT SERPL-MCNC: 6.4 MG/DL (ref 0.9–1.3)
GFR SERPLBLD CREATININE-BSD FMLA CKD-EPI: 10 ML/MIN/{1.73_M2}
GLUCOSE BLD-MCNC: 132 MG/DL (ref 70–99)
GLUCOSE BLD-MCNC: 133 MG/DL (ref 70–99)
GLUCOSE BLD-MCNC: 141 MG/DL (ref 70–99)
GLUCOSE BLD-MCNC: 148 MG/DL (ref 70–99)
GLUCOSE BLD-MCNC: 160 MG/DL (ref 70–99)
GLUCOSE BLD-MCNC: 194 MG/DL (ref 70–99)
GLUCOSE SERPL-MCNC: 142 MG/DL (ref 70–99)
HCT VFR BLD AUTO: 28.8 % (ref 40.5–52.5)
HCT VFR BLD AUTO: 29.6 % (ref 40.5–52.5)
HGB BLD-MCNC: 9.2 G/DL (ref 13.5–17.5)
HGB BLD-MCNC: 9.4 G/DL (ref 13.5–17.5)
PERFORMED ON: ABNORMAL
PHOSPHATE SERPL-MCNC: 3.1 MG/DL (ref 2.5–4.9)
POTASSIUM SERPL-SCNC: 5.3 MMOL/L (ref 3.5–5.1)
POTASSIUM SERPL-SCNC: 5.3 MMOL/L (ref 3.5–5.1)
SODIUM SERPL-SCNC: 133 MMOL/L (ref 136–145)

## 2024-07-08 PROCEDURE — 2500000003 HC RX 250 WO HCPCS: Performed by: NURSE ANESTHETIST, CERTIFIED REGISTERED

## 2024-07-08 PROCEDURE — 6370000000 HC RX 637 (ALT 250 FOR IP): Performed by: INTERNAL MEDICINE

## 2024-07-08 PROCEDURE — 6360000002 HC RX W HCPCS: Performed by: INTERNAL MEDICINE

## 2024-07-08 PROCEDURE — 85018 HEMOGLOBIN: CPT

## 2024-07-08 PROCEDURE — 80069 RENAL FUNCTION PANEL: CPT

## 2024-07-08 PROCEDURE — 3609012400 HC EGD TRANSORAL BIOPSY SINGLE/MULTIPLE: Performed by: INTERNAL MEDICINE

## 2024-07-08 PROCEDURE — 36415 COLL VENOUS BLD VENIPUNCTURE: CPT

## 2024-07-08 PROCEDURE — 99232 SBSQ HOSP IP/OBS MODERATE 35: CPT | Performed by: INTERNAL MEDICINE

## 2024-07-08 PROCEDURE — 88305 TISSUE EXAM BY PATHOLOGIST: CPT

## 2024-07-08 PROCEDURE — 7100000011 HC PHASE II RECOVERY - ADDTL 15 MIN: Performed by: INTERNAL MEDICINE

## 2024-07-08 PROCEDURE — 94761 N-INVAS EAR/PLS OXIMETRY MLT: CPT

## 2024-07-08 PROCEDURE — 2700000000 HC OXYGEN THERAPY PER DAY

## 2024-07-08 PROCEDURE — 2500000003 HC RX 250 WO HCPCS: Performed by: INTERNAL MEDICINE

## 2024-07-08 PROCEDURE — 0DB58ZX EXCISION OF ESOPHAGUS, VIA NATURAL OR ARTIFICIAL OPENING ENDOSCOPIC, DIAGNOSTIC: ICD-10-PCS | Performed by: INTERNAL MEDICINE

## 2024-07-08 PROCEDURE — 2709999900 HC NON-CHARGEABLE SUPPLY: Performed by: INTERNAL MEDICINE

## 2024-07-08 PROCEDURE — 3700000001 HC ADD 15 MINUTES (ANESTHESIA): Performed by: INTERNAL MEDICINE

## 2024-07-08 PROCEDURE — 2580000003 HC RX 258: Performed by: INTERNAL MEDICINE

## 2024-07-08 PROCEDURE — 7100000010 HC PHASE II RECOVERY - FIRST 15 MIN: Performed by: INTERNAL MEDICINE

## 2024-07-08 PROCEDURE — 3700000000 HC ANESTHESIA ATTENDED CARE: Performed by: INTERNAL MEDICINE

## 2024-07-08 PROCEDURE — 2580000003 HC RX 258: Performed by: NURSE ANESTHETIST, CERTIFIED REGISTERED

## 2024-07-08 PROCEDURE — 99233 SBSQ HOSP IP/OBS HIGH 50: CPT | Performed by: INTERNAL MEDICINE

## 2024-07-08 PROCEDURE — 1200000000 HC SEMI PRIVATE

## 2024-07-08 PROCEDURE — 6360000002 HC RX W HCPCS: Performed by: NURSE ANESTHETIST, CERTIFIED REGISTERED

## 2024-07-08 PROCEDURE — 85014 HEMATOCRIT: CPT

## 2024-07-08 RX ORDER — SODIUM CHLORIDE 0.9 % (FLUSH) 0.9 %
5-40 SYRINGE (ML) INJECTION EVERY 12 HOURS SCHEDULED
Status: DISCONTINUED | OUTPATIENT
Start: 2024-07-08 | End: 2024-07-08 | Stop reason: HOSPADM

## 2024-07-08 RX ORDER — PROPOFOL 10 MG/ML
INJECTION, EMULSION INTRAVENOUS PRN
Status: DISCONTINUED | OUTPATIENT
Start: 2024-07-08 | End: 2024-07-08 | Stop reason: SDUPTHER

## 2024-07-08 RX ORDER — ONDANSETRON 2 MG/ML
4 INJECTION INTRAMUSCULAR; INTRAVENOUS
Status: DISCONTINUED | OUTPATIENT
Start: 2024-07-08 | End: 2024-07-08 | Stop reason: HOSPADM

## 2024-07-08 RX ORDER — SODIUM CHLORIDE 9 MG/ML
INJECTION, SOLUTION INTRAVENOUS PRN
Status: DISCONTINUED | OUTPATIENT
Start: 2024-07-08 | End: 2024-07-08 | Stop reason: HOSPADM

## 2024-07-08 RX ORDER — SODIUM CHLORIDE, SODIUM LACTATE, POTASSIUM CHLORIDE, CALCIUM CHLORIDE 600; 310; 30; 20 MG/100ML; MG/100ML; MG/100ML; MG/100ML
INJECTION, SOLUTION INTRAVENOUS CONTINUOUS PRN
Status: DISCONTINUED | OUTPATIENT
Start: 2024-07-08 | End: 2024-07-08 | Stop reason: SDUPTHER

## 2024-07-08 RX ORDER — OXYCODONE HYDROCHLORIDE 5 MG/1
10 TABLET ORAL PRN
Status: DISCONTINUED | OUTPATIENT
Start: 2024-07-08 | End: 2024-07-08 | Stop reason: HOSPADM

## 2024-07-08 RX ORDER — SODIUM CHLORIDE 0.9 % (FLUSH) 0.9 %
5-40 SYRINGE (ML) INJECTION PRN
Status: DISCONTINUED | OUTPATIENT
Start: 2024-07-08 | End: 2024-07-08 | Stop reason: HOSPADM

## 2024-07-08 RX ORDER — SODIUM CHLORIDE, SODIUM LACTATE, POTASSIUM CHLORIDE, CALCIUM CHLORIDE 600; 310; 30; 20 MG/100ML; MG/100ML; MG/100ML; MG/100ML
INJECTION, SOLUTION INTRAVENOUS CONTINUOUS
Status: DISCONTINUED | OUTPATIENT
Start: 2024-07-08 | End: 2024-07-08 | Stop reason: HOSPADM

## 2024-07-08 RX ORDER — OXYCODONE HYDROCHLORIDE 5 MG/1
5 TABLET ORAL PRN
Status: DISCONTINUED | OUTPATIENT
Start: 2024-07-08 | End: 2024-07-08 | Stop reason: HOSPADM

## 2024-07-08 RX ORDER — NALOXONE HYDROCHLORIDE 0.4 MG/ML
INJECTION, SOLUTION INTRAMUSCULAR; INTRAVENOUS; SUBCUTANEOUS PRN
Status: DISCONTINUED | OUTPATIENT
Start: 2024-07-08 | End: 2024-07-08 | Stop reason: HOSPADM

## 2024-07-08 RX ORDER — LIDOCAINE HYDROCHLORIDE 20 MG/ML
INJECTION, SOLUTION EPIDURAL; INFILTRATION; INTRACAUDAL; PERINEURAL PRN
Status: DISCONTINUED | OUTPATIENT
Start: 2024-07-08 | End: 2024-07-08 | Stop reason: SDUPTHER

## 2024-07-08 RX ORDER — MEPERIDINE HYDROCHLORIDE 25 MG/ML
12.5 INJECTION INTRAMUSCULAR; INTRAVENOUS; SUBCUTANEOUS EVERY 5 MIN PRN
Status: DISCONTINUED | OUTPATIENT
Start: 2024-07-08 | End: 2024-07-08 | Stop reason: HOSPADM

## 2024-07-08 RX ADMIN — QUETIAPINE FUMARATE 50 MG: 25 TABLET ORAL at 21:11

## 2024-07-08 RX ADMIN — DULOXETINE HYDROCHLORIDE 60 MG: 60 CAPSULE, DELAYED RELEASE ORAL at 10:01

## 2024-07-08 RX ADMIN — PROPOFOL 200 MG: 10 INJECTION, EMULSION INTRAVENOUS at 15:56

## 2024-07-08 RX ADMIN — TRAZODONE HYDROCHLORIDE 50 MG: 50 TABLET ORAL at 21:10

## 2024-07-08 RX ADMIN — VENLAFAXINE HYDROCHLORIDE 75 MG: 75 CAPSULE, EXTENDED RELEASE ORAL at 10:00

## 2024-07-08 RX ADMIN — METOPROLOL SUCCINATE 25 MG: 25 TABLET, EXTENDED RELEASE ORAL at 10:01

## 2024-07-08 RX ADMIN — BUSPIRONE HYDROCHLORIDE 15 MG: 7.5 TABLET ORAL at 10:01

## 2024-07-08 RX ADMIN — SACUBITRIL AND VALSARTAN 1 TABLET: 24; 26 TABLET, FILM COATED ORAL at 10:00

## 2024-07-08 RX ADMIN — BUSPIRONE HYDROCHLORIDE 15 MG: 7.5 TABLET ORAL at 21:10

## 2024-07-08 RX ADMIN — ATORVASTATIN CALCIUM 80 MG: 40 TABLET, FILM COATED ORAL at 21:10

## 2024-07-08 RX ADMIN — SODIUM CHLORIDE, PRESERVATIVE FREE 10 ML: 5 INJECTION INTRAVENOUS at 10:02

## 2024-07-08 RX ADMIN — SODIUM CHLORIDE, PRESERVATIVE FREE 10 ML: 5 INJECTION INTRAVENOUS at 21:11

## 2024-07-08 RX ADMIN — PANTOPRAZOLE SODIUM 40 MG: 40 INJECTION, POWDER, FOR SOLUTION INTRAVENOUS at 21:10

## 2024-07-08 RX ADMIN — SACUBITRIL AND VALSARTAN 1 TABLET: 24; 26 TABLET, FILM COATED ORAL at 21:10

## 2024-07-08 RX ADMIN — LIDOCAINE HYDROCHLORIDE 60 MG: 20 INJECTION, SOLUTION EPIDURAL; INFILTRATION; INTRACAUDAL; PERINEURAL at 15:56

## 2024-07-08 RX ADMIN — OXYCODONE 10 MG: 5 TABLET ORAL at 22:08

## 2024-07-08 RX ADMIN — PANTOPRAZOLE SODIUM 40 MG: 40 INJECTION, POWDER, FOR SOLUTION INTRAVENOUS at 10:00

## 2024-07-08 RX ADMIN — SODIUM CHLORIDE, POTASSIUM CHLORIDE, SODIUM LACTATE AND CALCIUM CHLORIDE: 600; 310; 30; 20 INJECTION, SOLUTION INTRAVENOUS at 15:52

## 2024-07-08 RX ADMIN — INSULIN GLARGINE 10 UNITS: 100 INJECTION, SOLUTION SUBCUTANEOUS at 21:11

## 2024-07-08 RX ADMIN — CALCIUM ACETATE 1334 MG: 667 CAPSULE ORAL at 17:56

## 2024-07-08 ASSESSMENT — PAIN DESCRIPTION - ORIENTATION
ORIENTATION: LOWER;RIGHT
ORIENTATION: LOWER;RIGHT

## 2024-07-08 ASSESSMENT — PAIN DESCRIPTION - LOCATION
LOCATION: BACK;HIP;FOOT
LOCATION: BACK;HIP;FOOT

## 2024-07-08 ASSESSMENT — PAIN SCALES - GENERAL
PAINLEVEL_OUTOF10: 0
PAINLEVEL_OUTOF10: 8
PAINLEVEL_OUTOF10: 8

## 2024-07-08 ASSESSMENT — LIFESTYLE VARIABLES: SMOKING_STATUS: 0

## 2024-07-08 ASSESSMENT — PAIN DESCRIPTION - DESCRIPTORS
DESCRIPTORS: ACHING;SHARP
DESCRIPTORS: ACHING
DESCRIPTORS: ACHING

## 2024-07-08 ASSESSMENT — PAIN - FUNCTIONAL ASSESSMENT
PAIN_FUNCTIONAL_ASSESSMENT: WONG-BAKER FACES
PAIN_FUNCTIONAL_ASSESSMENT: 0-10

## 2024-07-08 ASSESSMENT — ENCOUNTER SYMPTOMS: SHORTNESS OF BREATH: 1

## 2024-07-08 NOTE — H&P
°C) (Temporal)   Resp 16   Ht 1.753 m (5' 9\")   Wt 95.7 kg (211 lb)   SpO2 99%   BMI 31.16 kg/m²    Airway: Mallampati: II (soft palate, uvula, fauces visible)  Pulmonary:Normal  Cardiac:Normal  Abdomen:Normal    Pre-Procedure Assessment / Plan:  ASA: Class 3 - A patient with severe systemic disease that limits activity but is not incapacitating  Level of Sedation Plan:Moderate sedation  Post Procedure plan: Return to same level of care    I assessed the patient and find that the patient is in satisfactory condition to proceed with the planned procedure and sedation plan.    I have explained the risk, benefits, and alternatives to the procedure; the patient understands and agrees to proceed.       Maurizio Reyes MD  7/8/2024

## 2024-07-08 NOTE — PROGRESS NOTES
Endo report called to bedside PCU RNSue. Pt now in SDS recovering where bedside report was given by this RN and CRNA.

## 2024-07-08 NOTE — PROGRESS NOTES
HEART FAILURE CARE PLAN:    Comorbidities Reviewed: Yes   Patient has a past medical history of Ambulatory dysfunction, Aortic stenosis, Arthritis, Asthma, Bilateral hilar adenopathy syndrome, CAD (coronary artery disease), Cardiomyopathy ([facility]), CHF (congestive heart failure) ([facility]), Chronic pain, COPD (chronic obstructive pulmonary disease) ([facility]), CVA (cerebral vascular accident) ([facility]), Depression, Diabetes mellitus ([facility]), Difficult intravenous access, Emphysema of lung ([facility]), ESRD (end stage renal disease) on dialysis ([facility]), Fear of needles, Gastric ulcer, GERD (gastroesophageal reflux disease), Heart valve problem, Hemodialysis patient ([facility]), History of spinal fracture, History of transcatheter aortic valve replacement (TAVR), Hx of blood clots, Hyperlipidemia, Hypertension, MI (myocardial infarction) ([facility]), Neuromuscular disorder ([facility]), Numbness and tingling in left arm, Pneumonia, PONV (postoperative nausea and vomiting), Prolonged emergence from general anesthesia, Sleep apnea, Stroke ([facility]), TIA (transient ischemic attack), and Unspecified diseases of blood and blood-forming organs.     Weights Reviewed: Yes   Admission weight: 100.7 kg (222 lb)   Wt Readings from Last 3 Encounters:   07/08/24 95.7 kg (211 lb)   07/05/24 102.1 kg (225 lb)   05/24/24 100.7 kg (222 lb)     Intake & Output Reviewed: Yes     Intake/Output Summary (Last 24 hours) at 7/8/2024 0806  Last data filed at 7/8/2024 0616  Gross per 24 hour   Intake 400 ml   Output 10 ml   Net 390 ml       ECHOCARDIOGRAM Reviewed: Yes   Patient's Ejection Fraction (EF) is greater than 40%     Medications Reviewed: Yes   SCHEDULED HOSPITAL MEDICATIONS:   pantoprazole (PROTONIX) 40 mg in sodium chloride (PF) 0.9 % 10 mL injection  40 mg IntraVENous BID    insulin glargine  10 Units SubCUTAneous Nightly    atorvastatin  80 mg Oral Nightly    epoetin siddharth-epbx  10,000 Units IntraVENous Once per day on Tue Thu Sat    insulin lispro  0-4 Units SubCUTAneous TID

## 2024-07-08 NOTE — PROGRESS NOTES
Handoff report given to Gemma MORFIN.  Patient is in stable condition and has no needs at this time. Call light is in reach and bed is in the lowest position.  Care is transferred at this time.

## 2024-07-08 NOTE — PROGRESS NOTES
Transport here to take pt back to room. Pt in stable condition at this time. Pt denies pain or other needs.

## 2024-07-08 NOTE — PROGRESS NOTES
PM assessment completed. See flow sheet. Pt denies any needs at this time. Call light within reach. Bed locked in lowest position. Bed alarm on.

## 2024-07-08 NOTE — PROGRESS NOTES
[facility] Daily Progress Note      Admit Date:  7/3/2024    Subjective:  Mr. Ann is being seen today for f/u CP and abnormal nuc imaging study. He c/o right foot pain and SOB today but denies CP. Heading down soon for EGD.     Objective:   /71   Pulse 81   Temp 98.2 °F (36.8 °C) (Oral)   Resp 18   Ht 1.753 m (5' 9\")   Wt 95.7 kg (211 lb)   SpO2 99%   BMI 31.16 kg/m²     Intake/Output Summary (Last 24 hours) at 7/8/2024 0735  Last data filed at 7/8/2024 0616  Gross per 24 hour   Intake 400 ml   Output 10 ml   Net 390 ml       TELEMETRY: NSR 81bpm     Physical Exam:  General:  Awake, alert, NAD  Skin:  Warm and dry  Neck:  JVD none  Chest:  Clear to auscultation, respiration easy  Cardiovascular:  RRR S1S2  Abdomen:  Soft NT  Extremities: s/p partial R foot amputation; no LE edema    Medications:    pantoprazole (PROTONIX) 40 mg in sodium chloride (PF) 0.9 % 10 mL injection  40 mg IntraVENous BID    insulin glargine  10 Units SubCUTAneous Nightly    atorvastatin  80 mg Oral Nightly    epoetin siddharth-epbx  10,000 Units IntraVENous Once per day on Tue Thu Sat    insulin lispro  0-4 Units SubCUTAneous TID WC    insulin lispro  0-4 Units SubCUTAneous Nightly    sodium chloride flush  5-40 mL IntraVENous 2 times per day    aspirin  81 mg Oral Daily    busPIRone  15 mg Oral TID    calcium acetate  2 capsule Oral TID WC    DULoxetine  60 mg Oral Daily    metoprolol succinate  25 mg Oral Daily    QUEtiapine  50 mg Oral Nightly    sacubitril-valsartan  1 tablet Oral BID    venlafaxine  75 mg Oral Daily    traZODone  50 mg Oral Nightly      dextrose      sodium chloride       heparin (porcine), calcium carbonate, ipratropium 0.5 mg-albuterol 2.5 mg, LORazepam, midodrine, glucose, dextrose bolus **OR** dextrose bolus, glucagon (rDNA), dextrose, sodium chloride flush, sodium chloride, ondansetron **OR** ondansetron, acetaminophen **OR** acetaminophen, polyethylene glycol, oxyCODONE HCl    Lab  syndrome) ([facility])     Bilateral leg weakness 12/04/2020    Bradycardia 10/19/2019    Syncope and collapse 10/19/2019    PAF (paroxysmal atrial fibrillation) ([facility])     Hypercholesteremia 07/30/2019    Chronic bronchitis ([facility]) 05/21/2019    Nasal congestion 05/21/2019    Chronic, continuous use of opioids 04/15/2019    Steal syndrome of dialysis vascular access ([facility])     SOB (shortness of breath) on exertion 12/24/2018    Anemia of chronic disease 11/12/2018    Pulmonary edema 11/09/2018    Fluid overload 11/09/2018    Renovascular hypertension     Mixed hyperlipidemia     Cigarette nicotine dependence in remission     Neuromuscular disorder ([facility])     Acute diastolic CHF (congestive heart failure) ([facility]) 03/18/2018    Hemodialysis-associated hypotension 11/22/2017    ESRD on dialysis ([facility]) 11/22/2017    Mucus plugging of bronchi     Nonrheumatic aortic valve stenosis     Pleural effusion     Chronic anemia     History of CVA (cerebrovascular accident)     Type 2 diabetes mellitus without complication, without long-term current use of insulin ([facility])     ZAINAB treated with BiPAP     Tobacco abuse 05/21/2017    CVA (cerebral vascular accident) ([facility]) 05/21/2017    Elevated troponin     Angina, class IV ([facility])     Dyspnea     Gastroparesis due to DM ([facility])     Biliary colic 01/04/2017    Symptomatic cholelithiasis 01/04/2017    Degeneration of lumbar or lumbosacral intervertebral disc 03/18/2016    Lumbar radiculopathy 03/18/2016    Lumbosacral spondylosis without myelopathy 03/18/2016    ZAINAB on CPAP 02/11/2016    Obesity     Community acquired pneumonia of left lower lobe of lung 03/28/2015    Depression with anxiety 06/05/2014    Passive smoke exposure 05/30/2014    Diabetic neuropathy ([facility]) 11/14/2013    Claudication in peripheral vascular disease ([facility]) 06/26/2013    Bilateral hilar adenopathy syndrome 06/03/2013    Sepsis without acute organ dysfunction ([facility]) 12/25/2012     Dawood Garcia MD, MD 7/8/2024 7:27 AM

## 2024-07-08 NOTE — ANESTHESIA POSTPROCEDURE EVALUATION
Department of Anesthesiology  Postprocedure Note    Patient: Igor Ann  MRN: [id number]  YOB: 1970  Date of evaluation: 7/8/2024    Procedure Summary       Date: 07/08/24 Room / Location: [address]    Anesthesia Start: 1552 Anesthesia Stop: 1618    Procedure: ESOPHAGOGASTRODUODENOSCOPY BIOPSY Diagnosis:       Melena      (Melena [K92.1])    Surgeons: Maurizio Reyes MD Responsible Provider: Oc Su MD    Anesthesia Type: TIVA ASA Status: 3            Anesthesia Type: No value filed.    Ernesto Phase I: Ernesto Score: 9    Ernesto Phase II: Ernesto Score: 8    Anesthesia Post Evaluation    Patient location during evaluation: bedside  Patient participation: complete - patient participated  Level of consciousness: awake and alert  Airway patency: patent  Nausea & Vomiting: no nausea  Cardiovascular status: hemodynamically stable  Respiratory status: acceptable  Hydration status: euvolemic  Pain management: adequate      Vitals:    07/08/24 0616 07/08/24 1000 07/08/24 1359 07/08/24 1607   BP: 132/71 130/65 (!) 155/68 (!) 116/53   Pulse: 81 65 72 69   Resp: 18 18 16 16   Temp: 98.2 °F (36.8 °C) 98.5 °F (36.9 °C) 97 °F (36.1 °C) 96.8 °F (36 °C)   TempSrc: Oral Oral Temporal Infrared   SpO2: 99% 99% 99% 97%   Weight: 95.7 kg (211 lb)      Height:            No notable events documented.

## 2024-07-08 NOTE — PROGRESS NOTES
Progress Note    Admit Date:  7/3/2024    Presented for CP   Hx of CAD,VTE, ESRD On HD, Systolic HF     Subjective:  Mr. Ann today seen resting in bed   He feels very tired. He did not have any further black emesis last night.   Hgb stable overnight    Objective:   Patient Vitals for the past 4 hrs:   BP Temp Temp src Pulse Resp SpO2 Weight   07/08/24 0616 132/71 98.2 °F (36.8 °C) Oral 81 18 99 % 95.7 kg (211 lb)            Intake/Output Summary (Last 24 hours) at 7/8/2024 0956  Last data filed at 7/8/2024 0616  Gross per 24 hour   Intake 400 ml   Output 10 ml   Net 390 ml         Physical Exam:    Gen: No distress. Alert.   Eyes: PERRL. No sclera icterus. No conjunctival injection.   Neck: No JVD.  Trachea midline.  Resp: No accessory muscle use. No crackles. No wheezes. No rhonchi. +Diminished breath sounds   CV: Regular rate. Regular rhythm. No murmur.  No rub. No edema.   Peripheral Pulses: +2 palpable, equal bilaterally   GI: Non-tender. Non-distended.  Normal bowel sounds.  Skin: Warm and dry. No nodule on exposed extremities. No rash on exposed extremities. 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 No joint deformity. No clubbing.   Neuro: Awake. Grossly nonfocal    Psych: Oriented. No anxiety or agitation.           Medications:  pantoprazole (PROTONIX) 40 mg in sodium chloride (PF) 0.9 % 10 mL injection, 40 mg, BID  insulin glargine, 10 Units, Nightly  atorvastatin, 80 mg, Nightly  epoetin siddharth-epbx, 10,000 Units, Once per day on Tue Thu Sat  insulin lispro, 0-4 Units, TID WC  insulin lispro, 0-4 Units, Nightly  sodium chloride flush, 5-40 mL, 2 times per day  aspirin, 81 mg, Daily  busPIRone, 15 mg, TID  calcium acetate, 2 capsule, TID WC  DULoxetine, 60 mg, Daily  metoprolol succinate, 25 mg, Daily  QUEtiapine, 50 mg, Nightly  sacubitril-valsartan, 1 tablet, BID  venlafaxine, 75 mg, Daily  traZODone, 50 mg, Nightly      PRN

## 2024-07-08 NOTE — ANESTHESIA PRE PROCEDURE
for: \"HIV\", \"HEPCAB\"    COVID-19 Screening (If Applicable):   Lab Results   Component Value Date/Time    COVID19 NOT DETECTED 07/03/2024 04:33 PM    COVID19 Not Detected 05/04/2020 03:15 PM           Anesthesia Evaluation  Patient summary reviewed and Nursing notes reviewed   history of anesthetic complications: PONV.  Airway: Mallampati: II  TM distance: >3 FB   Neck ROM: full  Mouth opening: > = 3 FB   Dental:          Pulmonary: breath sounds clear to auscultation  (+)  COPD (cont o2 at 4l., daily/prn nebs and inhalers):  shortness of breath:   sleep apnea: on CPAP,       asthma:     (-) not a current smoker          Patient did not smoke on day of surgery.                 Cardiovascular:    (+) hypertension:, valvular problems/murmurs: AS, past MI: > 6 months, CAD: obstructive, CABG/stent (mult stents):, CHF: systolic, CASH:, hyperlipidemia    (-) pacemaker and dysrhythmias    ECG reviewed  Rhythm: regular  Rate: normal    Stress test reviewed       Beta Blocker:  Dose within 24 Hrs         Neuro/Psych:   (+) CVA (L weakness, ue/le): residual symptoms, neuromuscular disease (hx gb):depression/anxiety    (-) TIA and psychiatric history           GI/Hepatic/Renal:   (+) GERD:, PUD, renal disease (m/w/f): ESRD and dialysis     (-) liver disease, bowel prep and no morbid obesity       Endo/Other:    (+) DiabetesType II DM, using insulin, : arthritis:., no malignancy/cancer.    (-) no malignancy/cancer               Abdominal:   (+) obese    Abdomen: soft.      Vascular:          Other Findings: Difficult historian  L chest p.c.        Anesthesia Plan      TIVA     ASA 3       Induction: intravenous.    MIPS: Prophylactic antiemetics administered.  Anesthetic plan and risks discussed with patient.      Plan discussed with CRNA.              This pre-anesthesia assessment may be used as a history and physical.    DOS STAFF ADDENDUM:    Pt seen and examined, chart reviewed (including anesthesia, drug and allergy

## 2024-07-08 NOTE — PROGRESS NOTES
Occupational Therapy/Physical Therapy    Hold therapy services at this time per RN due to pt scheduled for EGD and likely transfer for heart cath.    Ester Parra, OTR/L #[id number]   Nany Reyes, PT

## 2024-07-08 NOTE — PROGRESS NOTES
[facility]  Nephrology Follow up Note           Reason for Consult: ESRD  Requesting Physician:  Dr. Lawrence    Sub/interval history  He is doing ok.  Mild CP.  Breathing about at baseline.    Not having issues with dialysis.      HD on 7/6 w 1l net UF, post wt wt 93 kg  HD on 7/4 w 4l net UF, post wt 94.5 kg     ROS: No chest pain/shortness of breath/fever/nausea/vomiting  PSFH: No visitor    Scheduled Meds:   pantoprazole (PROTONIX) 40 mg in sodium chloride (PF) 0.9 % 10 mL injection  40 mg IntraVENous BID    insulin glargine  10 Units SubCUTAneous Nightly    atorvastatin  80 mg Oral Nightly    epoetin siddharth-epbx  10,000 Units IntraVENous Once per day on Tue Thu Sat    insulin lispro  0-4 Units SubCUTAneous TID WC    insulin lispro  0-4 Units SubCUTAneous Nightly    sodium chloride flush  5-40 mL IntraVENous 2 times per day    aspirin  81 mg Oral Daily    busPIRone  15 mg Oral TID    calcium acetate  2 capsule Oral TID WC    DULoxetine  60 mg Oral Daily    metoprolol succinate  25 mg Oral Daily    QUEtiapine  50 mg Oral Nightly    sacubitril-valsartan  1 tablet Oral BID    venlafaxine  75 mg Oral Daily    traZODone  50 mg Oral Nightly     Continuous Infusions:   dextrose      sodium chloride       PRN Meds:.heparin (porcine), calcium carbonate, ipratropium 0.5 mg-albuterol 2.5 mg, LORazepam, midodrine, glucose, dextrose bolus **OR** dextrose bolus, glucagon (rDNA), dextrose, sodium chloride flush, sodium chloride, ondansetron **OR** ondansetron, acetaminophen **OR** acetaminophen, polyethylene glycol, oxyCODONE HCl    History of Present Illness on 7/4/2024:    54 y.o. yo male with PMH of ESRD, CAD status post PCI, CHF, COPD, HTN, DM, CVA and HTN, PAD status post iliac stents who is admitted for chest pain  Patient is over 5 kg from his target weight and nephrology has been asked to see him for dialysis needs  Given his cardiac history, cardiology was consulted and they are planning on stress test and

## 2024-07-09 ENCOUNTER — HOSPITAL ENCOUNTER (INPATIENT)
Age: 54
LOS: 10 days | Discharge: NURSING, REHAB OR LONG-TERM CARE | DRG: 286 | End: 2024-07-19
Attending: INTERNAL MEDICINE | Admitting: INTERNAL MEDICINE
Payer: MEDICARE

## 2024-07-09 VITALS
TEMPERATURE: 97.9 F | RESPIRATION RATE: 18 BRPM | WEIGHT: 211.64 LBS | OXYGEN SATURATION: 99 % | HEART RATE: 80 BPM | DIASTOLIC BLOOD PRESSURE: 55 MMHG | SYSTOLIC BLOOD PRESSURE: 119 MMHG | HEIGHT: 69 IN | BODY MASS INDEX: 31.35 KG/M2

## 2024-07-09 DIAGNOSIS — I25.10 CORONARY ARTERY DISEASE, UNSPECIFIED VESSEL OR LESION TYPE, UNSPECIFIED WHETHER ANGINA PRESENT, UNSPECIFIED WHETHER NATIVE OR TRANSPLANTED HEART: Primary | ICD-10-CM

## 2024-07-09 DIAGNOSIS — I25.118 CORONARY ARTERY DISEASE OF NATIVE ARTERY OF NATIVE HEART WITH STABLE ANGINA PECTORIS (HCC): ICD-10-CM

## 2024-07-09 DIAGNOSIS — R94.39 ABNORMAL STRESS TEST: ICD-10-CM

## 2024-07-09 LAB
ALBUMIN SERPL-MCNC: 3 G/DL (ref 3.4–5)
ANION GAP SERPL CALCULATED.3IONS-SCNC: 16 MMOL/L (ref 3–16)
BUN SERPL-MCNC: 89 MG/DL (ref 7–20)
CALCIUM SERPL-MCNC: 9.6 MG/DL (ref 8.3–10.6)
CHLORIDE SERPL-SCNC: 94 MMOL/L (ref 99–110)
CO2 SERPL-SCNC: 22 MMOL/L (ref 21–32)
CREAT SERPL-MCNC: 7.2 MG/DL (ref 0.9–1.3)
GFR SERPLBLD CREATININE-BSD FMLA CKD-EPI: 8 ML/MIN/{1.73_M2}
GLUCOSE BLD-MCNC: 139 MG/DL (ref 70–99)
GLUCOSE BLD-MCNC: 143 MG/DL (ref 70–99)
GLUCOSE BLD-MCNC: 148 MG/DL (ref 70–99)
GLUCOSE SERPL-MCNC: 158 MG/DL (ref 70–99)
HCT VFR BLD AUTO: 27.9 % (ref 40.5–52.5)
HGB BLD-MCNC: 8.9 G/DL (ref 13.5–17.5)
PERFORMED ON: ABNORMAL
PHOSPHATE SERPL-MCNC: 3.7 MG/DL (ref 2.5–4.9)
POTASSIUM SERPL-SCNC: 5.5 MMOL/L (ref 3.5–5.1)
POTASSIUM SERPL-SCNC: 5.5 MMOL/L (ref 3.5–5.1)
SODIUM SERPL-SCNC: 132 MMOL/L (ref 136–145)

## 2024-07-09 PROCEDURE — 80069 RENAL FUNCTION PANEL: CPT

## 2024-07-09 PROCEDURE — 99233 SBSQ HOSP IP/OBS HIGH 50: CPT | Performed by: INTERNAL MEDICINE

## 2024-07-09 PROCEDURE — 6370000000 HC RX 637 (ALT 250 FOR IP): Performed by: INTERNAL MEDICINE

## 2024-07-09 PROCEDURE — 83735 ASSAY OF MAGNESIUM: CPT

## 2024-07-09 PROCEDURE — 36415 COLL VENOUS BLD VENIPUNCTURE: CPT

## 2024-07-09 PROCEDURE — 2700000000 HC OXYGEN THERAPY PER DAY

## 2024-07-09 PROCEDURE — 84484 ASSAY OF TROPONIN QUANT: CPT

## 2024-07-09 PROCEDURE — 90935 HEMODIALYSIS ONE EVALUATION: CPT

## 2024-07-09 PROCEDURE — 94660 CPAP INITIATION&MGMT: CPT

## 2024-07-09 PROCEDURE — 85018 HEMOGLOBIN: CPT

## 2024-07-09 PROCEDURE — 83550 IRON BINDING TEST: CPT

## 2024-07-09 PROCEDURE — 94761 N-INVAS EAR/PLS OXIMETRY MLT: CPT

## 2024-07-09 PROCEDURE — 6360000002 HC RX W HCPCS

## 2024-07-09 PROCEDURE — 85014 HEMATOCRIT: CPT

## 2024-07-09 PROCEDURE — 6360000002 HC RX W HCPCS: Performed by: INTERNAL MEDICINE

## 2024-07-09 PROCEDURE — 83540 ASSAY OF IRON: CPT

## 2024-07-09 PROCEDURE — 02HV33Z INSERTION OF INFUSION DEVICE INTO SUPERIOR VENA CAVA, PERCUTANEOUS APPROACH: ICD-10-PCS | Performed by: INTERNAL MEDICINE

## 2024-07-09 PROCEDURE — 2500000003 HC RX 250 WO HCPCS: Performed by: INTERNAL MEDICINE

## 2024-07-09 PROCEDURE — 84100 ASSAY OF PHOSPHORUS: CPT

## 2024-07-09 PROCEDURE — 80053 COMPREHEN METABOLIC PANEL: CPT

## 2024-07-09 PROCEDURE — 2580000003 HC RX 258: Performed by: INTERNAL MEDICINE

## 2024-07-09 PROCEDURE — 2060000000 HC ICU INTERMEDIATE R&B

## 2024-07-09 PROCEDURE — 6370000000 HC RX 637 (ALT 250 FOR IP)

## 2024-07-09 PROCEDURE — 85025 COMPLETE CBC W/AUTO DIFF WBC: CPT

## 2024-07-09 RX ORDER — INSULIN LISPRO 100 [IU]/ML
0-4 INJECTION, SOLUTION INTRAVENOUS; SUBCUTANEOUS NIGHTLY
Status: DISCONTINUED | OUTPATIENT
Start: 2024-07-10 | End: 2024-07-19 | Stop reason: HOSPADM

## 2024-07-09 RX ORDER — INSULIN LISPRO 100 [IU]/ML
0-4 INJECTION, SOLUTION INTRAVENOUS; SUBCUTANEOUS
Status: DISCONTINUED | OUTPATIENT
Start: 2024-07-10 | End: 2024-07-19 | Stop reason: HOSPADM

## 2024-07-09 RX ORDER — AMLODIPINE BESYLATE 5 MG/1
5 TABLET ORAL DAILY
Status: DISCONTINUED | OUTPATIENT
Start: 2024-07-09 | End: 2024-07-09 | Stop reason: HOSPADM

## 2024-07-09 RX ORDER — DEXTROSE MONOHYDRATE 100 MG/ML
INJECTION, SOLUTION INTRAVENOUS CONTINUOUS PRN
Status: DISCONTINUED | OUTPATIENT
Start: 2024-07-09 | End: 2024-07-19 | Stop reason: HOSPADM

## 2024-07-09 RX ORDER — SODIUM CHLORIDE 0.9 % (FLUSH) 0.9 %
5-40 SYRINGE (ML) INJECTION EVERY 12 HOURS SCHEDULED
Status: DISCONTINUED | OUTPATIENT
Start: 2024-07-10 | End: 2024-07-11 | Stop reason: SDUPTHER

## 2024-07-09 RX ORDER — SODIUM CHLORIDE 9 MG/ML
INJECTION, SOLUTION INTRAVENOUS PRN
Status: DISCONTINUED | OUTPATIENT
Start: 2024-07-09 | End: 2024-07-19 | Stop reason: HOSPADM

## 2024-07-09 RX ORDER — PROCHLORPERAZINE EDISYLATE 5 MG/ML
10 INJECTION INTRAMUSCULAR; INTRAVENOUS EVERY 6 HOURS PRN
Status: DISCONTINUED | OUTPATIENT
Start: 2024-07-09 | End: 2024-07-19 | Stop reason: HOSPADM

## 2024-07-09 RX ORDER — SODIUM CHLORIDE 0.9 % (FLUSH) 0.9 %
5-40 SYRINGE (ML) INJECTION PRN
Status: DISCONTINUED | OUTPATIENT
Start: 2024-07-09 | End: 2024-07-11 | Stop reason: SDUPTHER

## 2024-07-09 RX ORDER — POLYETHYLENE GLYCOL 3350 17 G/17G
17 POWDER, FOR SOLUTION ORAL DAILY
Status: DISCONTINUED | OUTPATIENT
Start: 2024-07-09 | End: 2024-07-09 | Stop reason: HOSPADM

## 2024-07-09 RX ORDER — PANTOPRAZOLE SODIUM 40 MG/1
40 TABLET, DELAYED RELEASE ORAL EVERY 12 HOURS
Status: DISCONTINUED | OUTPATIENT
Start: 2024-07-09 | End: 2024-07-09 | Stop reason: HOSPADM

## 2024-07-09 RX ORDER — ACETAMINOPHEN 325 MG/1
650 TABLET ORAL EVERY 6 HOURS PRN
Status: DISCONTINUED | OUTPATIENT
Start: 2024-07-09 | End: 2024-07-19 | Stop reason: HOSPADM

## 2024-07-09 RX ORDER — ACETAMINOPHEN 650 MG/1
650 SUPPOSITORY RECTAL EVERY 6 HOURS PRN
Status: DISCONTINUED | OUTPATIENT
Start: 2024-07-09 | End: 2024-07-19 | Stop reason: HOSPADM

## 2024-07-09 RX ORDER — GLUCAGON 1 MG/ML
1 KIT INJECTION PRN
Status: DISCONTINUED | OUTPATIENT
Start: 2024-07-09 | End: 2024-07-19 | Stop reason: HOSPADM

## 2024-07-09 RX ORDER — POLYETHYLENE GLYCOL 3350 17 G/17G
17 POWDER, FOR SOLUTION ORAL DAILY PRN
Status: DISCONTINUED | OUTPATIENT
Start: 2024-07-09 | End: 2024-07-19 | Stop reason: HOSPADM

## 2024-07-09 RX ADMIN — AMLODIPINE BESYLATE 5 MG: 5 TABLET ORAL at 17:40

## 2024-07-09 RX ADMIN — DULOXETINE HYDROCHLORIDE 60 MG: 60 CAPSULE, DELAYED RELEASE ORAL at 17:34

## 2024-07-09 RX ADMIN — VENLAFAXINE HYDROCHLORIDE 75 MG: 75 CAPSULE, EXTENDED RELEASE ORAL at 17:33

## 2024-07-09 RX ADMIN — PANTOPRAZOLE SODIUM 40 MG: 40 TABLET, DELAYED RELEASE ORAL at 17:39

## 2024-07-09 RX ADMIN — EPOETIN ALFA-EPBX 10000 UNITS: 10000 INJECTION, SOLUTION INTRAVENOUS; SUBCUTANEOUS at 17:35

## 2024-07-09 RX ADMIN — OXYCODONE 10 MG: 5 TABLET ORAL at 17:40

## 2024-07-09 RX ADMIN — METOPROLOL SUCCINATE 25 MG: 25 TABLET, EXTENDED RELEASE ORAL at 17:34

## 2024-07-09 RX ADMIN — SODIUM CHLORIDE, PRESERVATIVE FREE 10 ML: 5 INJECTION INTRAVENOUS at 17:37

## 2024-07-09 RX ADMIN — CALCIUM ACETATE 1334 MG: 667 CAPSULE ORAL at 17:33

## 2024-07-09 RX ADMIN — ASPIRIN 81 MG: 81 TABLET, CHEWABLE ORAL at 17:34

## 2024-07-09 RX ADMIN — EPOETIN ALFA-EPBX 10000 UNITS: 10000 INJECTION, SOLUTION INTRAVENOUS; SUBCUTANEOUS at 15:33

## 2024-07-09 ASSESSMENT — PAIN DESCRIPTION - ORIENTATION
ORIENTATION: LOWER;RIGHT
ORIENTATION: RIGHT;LEFT

## 2024-07-09 ASSESSMENT — PAIN SCALES - GENERAL
PAINLEVEL_OUTOF10: 0
PAINLEVEL_OUTOF10: 8
PAINLEVEL_OUTOF10: 10

## 2024-07-09 ASSESSMENT — PAIN DESCRIPTION - FREQUENCY: FREQUENCY: CONTINUOUS

## 2024-07-09 ASSESSMENT — LIFESTYLE VARIABLES: HOW MANY STANDARD DRINKS CONTAINING ALCOHOL DO YOU HAVE ON A TYPICAL DAY: PATIENT DOES NOT DRINK

## 2024-07-09 ASSESSMENT — PAIN DESCRIPTION - DESCRIPTORS
DESCRIPTORS: ACHING;DISCOMFORT
DESCRIPTORS: ACHING;SHARP

## 2024-07-09 ASSESSMENT — PAIN DESCRIPTION - LOCATION
LOCATION: BACK;HIP
LOCATION: LEG

## 2024-07-09 ASSESSMENT — PAIN DESCRIPTION - ONSET: ONSET: ON-GOING

## 2024-07-09 ASSESSMENT — PAIN DESCRIPTION - PAIN TYPE: TYPE: CHRONIC PAIN

## 2024-07-09 NOTE — PROGRESS NOTES
Writer attempted to call report to [facility], [phone] patient assigned [phone]. No answer.     Attempt 2 @ 1930:   Report given to NAVJOT Oakley   ETA pickup at 2000.     [facility] [place] PCU: EOS report given to NAVJOT Menendez.

## 2024-07-09 NOTE — PROGRESS NOTES
Treatment time: 3.5 hours  Net UF: 2000 ml     Pre weight: 98.1 kg  Post weight:96.0 kg       Access used: LTDC    Access function: positional with  ml/min     Medications or blood products given: Retacrit 10,000 units     Regular outpatient schedule: TTS     Summary of response to treatment: Patient tolerated treatment well and without any complications. Patient remained stable throughout entire treatment.

## 2024-07-09 NOTE — PROGRESS NOTES
Progress Note    Admit Date:  7/3/2024    Presented for CP   Hx of CAD,VTE, ESRD On HD, Systolic HF     Subjective:  Mr. Ann today seen resting in bed   He says he just feels really tired. No other complaints. He should have HD today. Feels like he might have some constipation.     Objective:   Patient Vitals for the past 4 hrs:   BP Temp Temp src Pulse Resp SpO2   07/09/24 0945 (!) 147/72 97 °F (36.1 °C) Axillary 60 22 100 %          Intake/Output Summary (Last 24 hours) at 7/9/2024 1024  Last data filed at 7/9/2024 0853  Gross per 24 hour   Intake 442 ml   Output --   Net 442 ml         Physical Exam:    Gen: No distress. Alert.   Eyes: PERRL. No sclera icterus. No conjunctival injection.   Neck: No JVD.  Trachea midline.  Resp: No accessory muscle use. No crackles. No wheezes. No rhonchi. +Diminished breath sounds   CV: Regular rate. Regular rhythm. No murmur.  No rub. No edema.   Peripheral Pulses: +2 palpable, equal bilaterally   GI: Non-tender. Non-distended.  Normal bowel sounds.  Skin: Warm and dry. No nodule on exposed extremities. No rash on exposed extremities. 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 No joint deformity. No clubbing.   Neuro: Awake. Grossly nonfocal    Psych: Oriented. No anxiety or agitation.     Medications:  amLODIPine, 5 mg, Daily  pantoprazole (PROTONIX) 40 mg in sodium chloride (PF) 0.9 % 10 mL injection, 40 mg, BID  insulin glargine, 10 Units, Nightly  atorvastatin, 80 mg, Nightly  epoetin siddharth-epbx, 10,000 Units, Once per day on Tue Thu Sat  insulin lispro, 0-4 Units, TID WC  insulin lispro, 0-4 Units, Nightly  sodium chloride flush, 5-40 mL, 2 times per day  aspirin, 81 mg, Daily  busPIRone, 15 mg, TID  calcium acetate, 2 capsule, TID WC  DULoxetine, 60 mg, Daily  metoprolol succinate, 25 mg, Daily  QUEtiapine, 50 mg, Nightly  sacubitril-valsartan, 1 tablet, BID  venlafaxine, 75 mg, Daily  traZODone, 50 mg,  normocytic anemia   -monitor CBC   -denies s/s of bleeding   -likely 2/2 to CKD   -gets retacrit  -work up for possible hematemesis as above    #Chronic osteomyelitis   -s/p right foot I&D with MT bone resection and graft application  -follows with podiatry and ID outpatient , completed IV antibiotics  -wound care consulted     #DM type 2   -lantus  -SSI   -monitor BG     #HTN   -on metoprolol and entresto     #Hx of CVA  -eliquis, ASA, statin - Eliquis currently on hold    #Hx of VTE  -on eliquis     #Depression   #Anxiety   -on buspar, cymbalta     #Chronic pain   -on oxycodone     #ZAINAB   -CPAP nightly     DVT Prophylaxis: Eliquis - on hold currently  Diet: Diet NPO  Code Status: Full Code    Laura Stephenson,   07/09/24  10:24 AM

## 2024-07-09 NOTE — DISCHARGE SUMMARY
Hospital Medicine Discharge Summary    Patient: Igor Ann     Gender: male  : 1970   Age: 54 y.o.  MRN: [id number]    Admitting Physician: Owen Willoughby MD  Discharge Physician: Laura Stephenson DO    Code Status: Full Code     Admit Date: 7/3/2024   Discharge Date:  2024     Discharge Diagnoses:    Active Hospital Problems    Diagnosis Date Noted    Chronic systolic CHF (congestive heart failure) (HCC) [I50.22]      Priority: High    Hypertension [I10] 2013     Priority: Medium    Dark emesis [K92.0] 2024    Chest pain [R07.9] 2024    Abnormal nuclear cardiac imaging test [R93.1] 01/15/2024    Acute on chronic left systolic heart failure (HCC) [I50.23] 2024    Pleural effusion [J90]     Elevated troponin [R79.89]        Condition at Discharge: Stable transfer to [facility] Hospital Course:     #Chest pain   #Hx of CAD   #EKG changes with new T wave inversions  -elevated troponin but it is flat   -CP resolved with nitro   -on ASA, statin, BB   -cardiology consulted   ECHO   Nuclear stress test abnormal, cath planned but interventional to see first for evaluation.     Possible hematemesis overnight - no further events  -Eliquis on hold  Hgb stable increase the check  FOBT  PPI IV BID  GI consultation  EGD shows gastritis  PPI BID for 2  months, ok for cath     #Bilateral pleural effusions   -fluid removal per dialysis   -stable on baseline oxygen  -denies s/s of pneumonia      #Chronic systolic CHF   -EF 35-45%, last echo from 3/27/24  -daily weights, intake and output  -continue BB, entresto   -cardiology consulted      #Elevated troponin   -113-->112-->111  -could be 2/2 to renal disease see assessment as above     #ESRD on HD   -HD MWF   -on phoslo  -renal diet   -nephrology consulted     #Chronic hypoxic respiratory failure   #COPD without AE   -stable on baseline 4 L O2   -continue prn inhalers      #Chronic normocytic anemia   -monitor CBC   -denies s/s of  been electronically signed. Note Initiated:7/8/2024 Note Completed:7/8/2024 4:08 PM    Echo (TTE) limited (PRN contrast/bubble/strain/3D)    Result Date: 7/5/2024    Left Ventricle: Mildly reduced left ventricular systolic function with a visually estimated EF of 45 - 50%. Moderate hypokinesis of the following segments: basal anterior and mid anterior.   Right Atrium: Question calcified atrial structure vs mass noted in the right atrium. This was visualized in previous echo on 4/1/24.This is consistent with his dialysis catheter,   Pericardium: Small (<1 cm) pericardial effusion present.   Limited echocardiogram.   Image quality is technically difficult. Technically difficult study and technically difficult study due to patient's body habitus.     Nuclear stress test with myocardial perfusion    Result Date: 7/5/2024    Stress Combined Conclusion: This is an abnormal pharmacological myocardial perfusion study. Findings suggest a high risk of cardiac events.   Perfusion Comments: LV perfusion is abnormal.   Perfusion Defect: There are multiple perfusion defects. There is a moderate sized perfusion defect involving the apical cap, apicolateral wall, and apical anterior wall of moderate to severe intensity that is reversible consistent with myocardial ischemia.There is a moderate to large sized perfusion defect involving the anterior wall that appears reversibile consistent with myocardial ischemia. Additionally, there is a large sized perfusion defect involving the inferior wall of moderate intensity with partial reversibility. This is most consistent with prior infarct and darrick-infarct ischemia.   Stress Function: Left ventricular size appears mild-moderately dilated. There is severe global hypokinesis. Left ventricular function is moderate-severely reduced with calculated LVEF of 37%.   Perfusion Conclusion: There is no evidence of transient ischemic dilation (TID).   Image quality is good.   Stress Test: A

## 2024-07-09 NOTE — PLAN OF CARE
Problem: Discharge Planning  Goal: Discharge to home or other facility with appropriate resources  7/8/2024 2307 by Gemma Edwards RN  Outcome: Progressing  7/8/2024 1812 by Sue Jones RN  Outcome: Progressing     Problem: Pain  Goal: Verbalizes/displays adequate comfort level or baseline comfort level  7/8/2024 2307 by Gemam Edwards RN  Outcome: Progressing  7/8/2024 1812 by Sue Jones RN  Outcome: Progressing     Problem: Safety - Adult  Goal: Free from fall injury  7/8/2024 2307 by Gemma Edwards RN  Outcome: Progressing  7/8/2024 1812 by Sue Jones RN  Outcome: Progressing     Problem: Chronic Conditions and Co-morbidities  Goal: Patient's chronic conditions and co-morbidity symptoms are monitored and maintained or improved  7/8/2024 2307 by Gemma Edwards RN  Outcome: Progressing  7/8/2024 1812 by Sue Jones RN  Outcome: Progressing     Problem: Cardiovascular - Adult  Goal: Maintains optimal cardiac output and hemodynamic stability  7/8/2024 2307 by Gemma Edwards RN  Outcome: Progressing  7/8/2024 1812 by Sue Jones RN  Outcome: Progressing  Goal: Absence of cardiac dysrhythmias or at baseline  7/8/2024 2307 by Gemma Edwards RN  Outcome: Progressing  7/8/2024 1812 by Sue Jones RN  Outcome: Progressing     Problem: Skin/Tissue Integrity  Goal: Absence of new skin breakdown  Description: 1.  Monitor for areas of redness and/or skin breakdown  2.  Assess vascular access sites hourly  3.  Every 4-6 hours minimum:  Change oxygen saturation probe site  4.  Every 4-6 hours:  If on nasal continuous positive airway pressure, respiratory therapy assess nares and determine need for appliance change or resting period.  7/8/2024 2307 by Gemma Edwards RN  Outcome: Progressing  7/8/2024 1812 by Sue Jones RN  Outcome: Progressing

## 2024-07-09 NOTE — FLOWSHEET NOTE
07/08/24 2102   Vital Signs   Temp 98 °F (36.7 °C)   Temp Source Oral   Pulse 74   Heart Rate Source Monitor   Respirations 18   BP (!) 165/75   MAP (Calculated) 105   BP Location Right upper arm   BP Method Automatic   Patient Position Semi fowlers   Pain Assessment   Pain Assessment 0-10   Pain Level 8   Patient's Stated Pain Goal 0 - No pain   Pain Location Back;Hip;Foot   Pain Orientation Lower;Right   Pain Descriptors Aching   Oxygen Therapy   SpO2 100 %   O2 Device Nasal cannula   O2 Flow Rate (L/min) 4 L/min   Rhythm Interpretation   Cardiac Rhythm Sinus rhythm

## 2024-07-09 NOTE — PROGRESS NOTES
Occupational Therapy/Physical Therapy    Orders received, chart reviewed and RN okay'd to see pt.  Attempt to see this AM, but pt refused, reporting pain and fatigue.  Will reattempt as pt tolerates and schedule allows.    Ester Parra, OTR/L #[id number]  Nany Reyes, PT

## 2024-07-09 NOTE — PROGRESS NOTES
07/09/24 0305   NIV Type   Mode AVAPS   Mask Type Full face mask   Mask Size Large   Assessment   Respirations 14   Settings/Measurements   CPAP/EPAP 5 cmH2O   IPAP Min 12 cmH2O   IPAP Max 30 cmH2O   Vt (Set, mL) 475 mL   Vt (Measured) 514 mL   Rate Ordered 12   FiO2  40 %

## 2024-07-09 NOTE — PROGRESS NOTES
PROGRESS NOTE  S:54 yrs Patient  admitted on 7/3/2024 with Chest pain of uncertain etiology [R07.9] .  Today, he is on Cpap. No further reported signs of bleeding. He denies abdominal pain. He has plans for HD today.     Exam:   Vitals:    07/09/24 0945   BP: (!) 147/72   Pulse: 60   Resp: 22   Temp: 97 °F (36.1 °C)   SpO2: 100%   Generalized: alert, no acute distress  HEENT: sclera clear, anicteric  Neck supple, trachea midline  Heart NSR  Abdomen: soft, NT, ND  Extremities no edema, R foot amputation     Medications: Reviewed    Labs:  CBC:   Recent Labs     07/07/24  0451 07/07/24  1236 07/08/24  0500 07/08/24  2152 07/09/24  0435   WBC 8.4  --   --   --   --    HGB 9.5*   < > 9.2* 9.4* 8.9*   HCT 29.0*   < > 28.8* 29.6* 27.9*   MCV 82.3  --   --   --   --      --   --   --   --     < > = values in this interval not displayed.     BMP:   Recent Labs     07/08/24  0500 07/09/24  0435   * 132*   K 5.3*  5.3* 5.5*  5.5*   CL 94* 94*   CO2 25 22   PHOS 3.1 3.7   BUN 65* 89*   CREATININE 6.4* 7.2*     Attending Supervising Physician's Attestation Statement  The patient is a 54 y.o. male. I have performed a history and physical examination of the patient. I discussed the case with ILAN Mason    I reviewed the patient's Past Medical History, Past Surgical History, Medications, and Allergies.     Physical Exam:  Vitals:    07/09/24 0945 07/09/24 1328 07/09/24 1705 07/09/24 1740   BP: (!) 147/72 (!) 160/79 (!) 156/85    Pulse: 60 68 79    Resp: 22 18 18 14   Temp: 97 °F (36.1 °C) 97.2 °F (36.2 °C) 97.6 °F (36.4 °C)    TempSrc: Axillary      SpO2: 100%      Weight:   96 kg (211 lb 10.3 oz)    Height:           Physical Examination:   General- improved and chronically ill appearing  Mental status - alert, oriented to person, place, and time  Eyes - sclera anicteric  Neck - supple, no significant adenopathy  Heart - normal rate and regular rhythm  Abdomen - soft,  NT, ND  Extremities - no pedal edema         Assessment:  55 yo male with pmx of ESRD on HD, HTN, osteomyelitis, CAD s/p stents, PVD, and CHF admitted for chest pain. EGD for coffee ground emesis 7/8 with Grade C reflux esophagitis and duodenitis without bleeding. No overt signs of bleeding noted.     Plan  Continue supportive care  Trend H&H  Monitor for signs of bleeding  PPI PO BID x 8 weeks  Plan for HD today   Cardiology following--Plan to transfer to [facility] for abnormal Denise nuc    Elsy Mason, APRN - CNP  10:02 AM 7/9/2024                      54 year old male with history of HTN, HLD, DM, CAD s/p PCI, CVA, COPD, CHF, PAD s/p iliac stent, admitted with coffee ground emesis. EGD showed reflux esophagitis but no active bleeding    Continue supportive care. PPI BID x 2m. Monitor Hgb. Observe for signs of bleeding. HD per nephrology. Proceed with cardiology workup as planned.    Maurizio Reyes MD          (O) [phone]  (O) [phone]

## 2024-07-09 NOTE — PROGRESS NOTES
[facility] [place] Daily Progress Note      Admit Date:  7/3/2024    Subjective:  Mr. Ann is being seen today for f/u CP and abnormal nuc imaging study. He c/o fatigue today but denies CP. On CPAP currently.     Objective:   BP (!) 169/48   Pulse 62   Temp 97.3 °F (36.3 °C) (Axillary)   Resp 20   Ht 1.753 m (5' 9\")   Wt 98.1 kg (216 lb 4.3 oz)   SpO2 100%   BMI 31.94 kg/m²     Intake/Output Summary (Last 24 hours) at 7/9/2024 0733  Last data filed at 7/8/2024 1956  Gross per 24 hour   Intake 442 ml   Output --   Net 442 ml       TELEMETRY: SB 57bpm     Physical Exam:  General:  Awake, alert, NAD  Skin:  Warm and dry  Neck:  JVD none  Chest:  diffusely soft bs  Cardiovascular:  RRR S1S2  Abdomen:  Soft NT  Extremities: s/p partial R foot amputation; no LE edema    Medications:    pantoprazole (PROTONIX) 40 mg in sodium chloride (PF) 0.9 % 10 mL injection  40 mg IntraVENous BID    insulin glargine  10 Units SubCUTAneous Nightly    atorvastatin  80 mg Oral Nightly    epoetin siddharth-epbx  10,000 Units IntraVENous Once per day on Tue Thu Sat    insulin lispro  0-4 Units SubCUTAneous TID WC    insulin lispro  0-4 Units SubCUTAneous Nightly    sodium chloride flush  5-40 mL IntraVENous 2 times per day    aspirin  81 mg Oral Daily    busPIRone  15 mg Oral TID    calcium acetate  2 capsule Oral TID WC    DULoxetine  60 mg Oral Daily    metoprolol succinate  25 mg Oral Daily    QUEtiapine  50 mg Oral Nightly    sacubitril-valsartan  1 tablet Oral BID    venlafaxine  75 mg Oral Daily    traZODone  50 mg Oral Nightly      dextrose      sodium chloride       heparin (porcine), calcium carbonate, ipratropium 0.5 mg-albuterol 2.5 mg, LORazepam, midodrine, glucose, dextrose bolus **OR** dextrose bolus, glucagon (rDNA), dextrose, sodium chloride flush, sodium chloride, ondansetron **OR** ondansetron, acetaminophen **OR** acetaminophen, polyethylene glycol, oxyCODONE HCl    Lab Data:  CBC:   Recent Labs      07/07/2024    Chest pain 07/03/2024    Foot ulcer with necrosis of bone, left ([facility]) 05/02/2024    Right foot ulcer, with necrosis of bone ([facility]) 05/02/2024    Foot infection 05/02/2024    Abnormal nuclear cardiac imaging test 01/15/2024    ZAINAB (obstructive sleep apnea) 01/15/2024    Acute systolic CHF (congestive heart failure) ([facility]) 01/15/2024    Acute on chronic respiratory failure ([facility]) 01/09/2024    Non-compliance 01/09/2024    Diabetic ketoacidosis without coma associated with diabetes mellitus due to underlying condition ([facility]) 01/09/2024    Acute on chronic left systolic heart failure ([facility]) 01/03/2024    Anxiety 01/03/2024    High anion gap metabolic acidosis 12/08/2023    Bimalleolar fracture of right ankle, closed, initial encounter 11/01/2023    Sepsis ([facility]) 09/27/2023    Volume overload 09/15/2023    Acute respiratory failure with hypoxia ([facility]) 08/28/2023    ESRD (end stage renal disease) ([facility]) 07/05/2023    Wound infection 05/27/2023    Gangrene of right foot ([facility]) 05/25/2023    Symptomatic bradycardia 05/24/2023    Pulmonary HTN ([facility]) 05/24/2023    Aortic valve disease 05/24/2023    Cardiogenic shock ([facility]) 05/24/2023    Ulcer with gangrene, with unspecified severity ([facility]) 05/22/2023    Abnormal cardiovascular stress test 05/10/2023    Chest heaviness 05/08/2023    Respiratory failure ([facility]) 04/18/2022    Pulmonary edema with diastolic CHF, NYHA class 3 ([facility]) 03/17/2022    Liberty-rectal abscess     Somnolence     Atrial flutter ([facility])     SIRS (systemic inflammatory response syndrome) ([facility]) 02/21/2022    NSTEMI (non-ST elevated myocardial infarction) ([facility]) 01/12/2022    Acute respiratory failure with hypercapnia ([facility]) 11/30/2021    Acute pulmonary edema ([facility]) 11/30/2021    Grade II diastolic dysfunction 11/30/2021    Shock circulatory ([facility]) 11/30/2021    Smoker 11/30/2021    Normocytic normochromic anemia 11/30/2021    Respiratory failure with hypoxia ([facility]) 11/29/2021    Speech problem     Urinary tract

## 2024-07-09 NOTE — PROGRESS NOTES
[email or web address]  Nephrology Follow up Note           Reason for Consult: ESRD  Requesting Physician:  Dr. Lawrence    Sub/interval history  He is doing ok.  Mild CP.  Breathing about at baseline.    Not having issues with dialysis.    Due for dialysis today and then going to [place] for [facility]    ROS: No n/v, no edema   PSFH: No visitor    Scheduled Meds:   amLODIPine  5 mg Oral Daily    polyethylene glycol  17 g Oral Daily    pantoprazole (PROTONIX) 40 mg in sodium chloride (PF) 0.9 % 10 mL injection  40 mg IntraVENous BID    insulin glargine  10 Units SubCUTAneous Nightly    atorvastatin  80 mg Oral Nightly    epoetin siddharth-epbx  10,000 Units IntraVENous Once per day on Tue Thu Sat    insulin lispro  0-4 Units SubCUTAneous TID WC    insulin lispro  0-4 Units SubCUTAneous Nightly    sodium chloride flush  5-40 mL IntraVENous 2 times per day    aspirin  81 mg Oral Daily    busPIRone  15 mg Oral TID    calcium acetate  2 capsule Oral TID WC    DULoxetine  60 mg Oral Daily    metoprolol succinate  25 mg Oral Daily    QUEtiapine  50 mg Oral Nightly    sacubitril-valsartan  1 tablet Oral BID    venlafaxine  75 mg Oral Daily    traZODone  50 mg Oral Nightly     Continuous Infusions:   dextrose      sodium chloride       PRN Meds:.heparin (porcine), calcium carbonate, ipratropium 0.5 mg-albuterol 2.5 mg, LORazepam, midodrine, glucose, dextrose bolus **OR** dextrose bolus, glucagon (rDNA), dextrose, sodium chloride flush, sodium chloride, ondansetron **OR** ondansetron, acetaminophen **OR** acetaminophen, oxyCODONE HCl    History of Present Illness on 7/4/2024:    54 y.o. yo male with PMH of ESRD, CAD status post PCI, CHF, COPD, HTN, DM, CVA and HTN, PAD status post iliac stents who is admitted for chest pain  Patient is over 5 kg from his target weight and nephrology has been asked to see him for dialysis needs  Given his cardiac history, cardiology was consulted and they are planning on stress test and echocardiogram

## 2024-07-09 NOTE — FLOWSHEET NOTE
07/09/24 0945   Vital Signs   Temp 97 °F (36.1 °C)   Temp Source Axillary   Pulse 60   Heart Rate Source Monitor   Respirations 22   BP (!) 147/72   MAP (Calculated) 97   BP Location Right upper arm   BP Method Automatic   Patient Position Semi fowlers   Pain Assessment   Pain Assessment None - Denies Pain   Oxygen Therapy   SpO2 100 %   O2 Device PAP (positive airway pressure)       VS as shown. Patient is refusing AM medication at this time. Patient reports feeling tired. Patient is currently on bi-pap and wishes to go back to sleep at this time. AM medications and assessment held at this time due to refusal.    Drysol Counseling:  I discussed with the patient the risks of drysol/aluminum chloride including but not limited to skin rash, itching, irritation, burning.

## 2024-07-09 NOTE — CARE COORDINATION
GI OK with plan to proceed with cath per cardiology notes. Pt from [facility] and plan possible transfer to [facility] for cath. CM following.    HD today at [place] and then transfer to [facility] later today. Agueda with [facility] aware of above plan.  
Group Therapy Note    Date:10/1/18  Time:2030    Patient did not attend Wrap Up Group Therapy.  Nurse Notified    Notes:       Hardy Moore
plan. Freedom of choice list with basic dialogue that supports the patient's individualized plan of care/goals and shares the quality data associated with the providers was provided to: Patient   Patient Representative Name:       The Patient and/or Patient Representative Agree with the Discharge Plan? Yes    Lima Alvares RN  Case Management Department

## 2024-07-09 NOTE — PROGRESS NOTES
07/08/24 7486   NIV Type   Mode AVAPS   Mask Type Full face mask   Mask Size Large   Assessment   Respirations 18   SpO2 100 %   Settings/Measurements   CPAP/EPAP 5 cmH2O   IPAP Min 12 cmH2O   IPAP Max 30 cmH2O   Vt (Set, mL) 475 mL   Vt (Measured) 451 mL   Rate Ordered 12   FiO2  40 %

## 2024-07-10 ENCOUNTER — ANESTHESIA EVENT (OUTPATIENT)
Dept: CARDIAC CATH/INVASIVE PROCEDURES | Age: 54
End: 2024-07-10
Payer: MEDICARE

## 2024-07-10 ENCOUNTER — ANESTHESIA (OUTPATIENT)
Dept: CARDIAC CATH/INVASIVE PROCEDURES | Age: 54
End: 2024-07-10
Payer: MEDICARE

## 2024-07-10 PROBLEM — R94.39 ABNORMAL STRESS TEST: Status: ACTIVE | Noted: 2024-07-09

## 2024-07-10 LAB
ALBUMIN SERPL-MCNC: 3.7 G/DL (ref 3.4–5)
ALBUMIN/GLOB SERPL: 0.9 {RATIO} (ref 1.1–2.2)
ALP SERPL-CCNC: 183 U/L (ref 40–129)
ALT SERPL-CCNC: 13 U/L (ref 10–40)
ANION GAP SERPL CALCULATED.3IONS-SCNC: 11 MMOL/L (ref 3–16)
ANION GAP SERPL CALCULATED.3IONS-SCNC: 13 MMOL/L (ref 3–16)
AST SERPL-CCNC: 12 U/L (ref 15–37)
BASOPHILS # BLD: 0.1 K/UL (ref 0–0.2)
BASOPHILS NFR BLD: 0.9 %
BILIRUB SERPL-MCNC: <0.2 MG/DL (ref 0–1)
BUN SERPL-MCNC: 52 MG/DL (ref 7–20)
BUN SERPL-MCNC: 56 MG/DL (ref 7–20)
CALCIUM SERPL-MCNC: 9.6 MG/DL (ref 8.3–10.6)
CALCIUM SERPL-MCNC: 9.9 MG/DL (ref 8.3–10.6)
CHLORIDE SERPL-SCNC: 93 MMOL/L (ref 99–110)
CHLORIDE SERPL-SCNC: 96 MMOL/L (ref 99–110)
CO2 SERPL-SCNC: 24 MMOL/L (ref 21–32)
CO2 SERPL-SCNC: 25 MMOL/L (ref 21–32)
CREAT SERPL-MCNC: 5.3 MG/DL (ref 0.9–1.3)
CREAT SERPL-MCNC: 5.7 MG/DL (ref 0.9–1.3)
DEPRECATED RDW RBC AUTO: 17.1 % (ref 12.4–15.4)
DEPRECATED RDW RBC AUTO: 17.5 % (ref 12.4–15.4)
EKG ATRIAL RATE: 70 BPM
EKG DIAGNOSIS: NORMAL
EKG P AXIS: 89 DEGREES
EKG P-R INTERVAL: 182 MS
EKG Q-T INTERVAL: 396 MS
EKG QRS DURATION: 96 MS
EKG QTC CALCULATION (BAZETT): 427 MS
EKG R AXIS: 80 DEGREES
EKG T AXIS: -40 DEGREES
EKG VENTRICULAR RATE: 70 BPM
EOSINOPHIL # BLD: 0.3 K/UL (ref 0–0.6)
EOSINOPHIL NFR BLD: 4.6 %
GFR SERPLBLD CREATININE-BSD FMLA CKD-EPI: 11 ML/MIN/{1.73_M2}
GFR SERPLBLD CREATININE-BSD FMLA CKD-EPI: 12 ML/MIN/{1.73_M2}
GLUCOSE BLD-MCNC: 105 MG/DL (ref 70–99)
GLUCOSE BLD-MCNC: 120 MG/DL (ref 70–99)
GLUCOSE BLD-MCNC: 138 MG/DL (ref 70–99)
GLUCOSE BLD-MCNC: 199 MG/DL (ref 70–99)
GLUCOSE SERPL-MCNC: 144 MG/DL (ref 70–99)
GLUCOSE SERPL-MCNC: 99 MG/DL (ref 70–99)
HCT VFR BLD AUTO: 30.6 % (ref 40.5–52.5)
HCT VFR BLD AUTO: 31.9 % (ref 40.5–52.5)
HGB BLD-MCNC: 10 G/DL (ref 13.5–17.5)
HGB BLD-MCNC: 9.5 G/DL (ref 13.5–17.5)
IRON SATN MFR SERPL: 10 % (ref 20–50)
IRON SERPL-MCNC: 23 UG/DL (ref 59–158)
LYMPHOCYTES # BLD: 0.7 K/UL (ref 1–5.1)
LYMPHOCYTES NFR BLD: 11 %
MAGNESIUM SERPL-MCNC: 2.1 MG/DL (ref 1.8–2.4)
MAGNESIUM SERPL-MCNC: 2.1 MG/DL (ref 1.8–2.4)
MCH RBC QN AUTO: 26.3 PG (ref 26–34)
MCH RBC QN AUTO: 26.4 PG (ref 26–34)
MCHC RBC AUTO-ENTMCNC: 31 G/DL (ref 31–36)
MCHC RBC AUTO-ENTMCNC: 31.5 G/DL (ref 31–36)
MCV RBC AUTO: 83.7 FL (ref 80–100)
MCV RBC AUTO: 85 FL (ref 80–100)
MONOCYTES # BLD: 0.6 K/UL (ref 0–1.3)
MONOCYTES NFR BLD: 8.3 %
NEUTROPHILS # BLD: 5 K/UL (ref 1.7–7.7)
NEUTROPHILS NFR BLD: 75.2 %
PERFORMED ON: ABNORMAL
PHOSPHATE SERPL-MCNC: 2.7 MG/DL (ref 2.5–4.9)
PLATELET # BLD AUTO: 286 K/UL (ref 135–450)
PLATELET # BLD AUTO: 309 K/UL (ref 135–450)
PMV BLD AUTO: 7.3 FL (ref 5–10.5)
PMV BLD AUTO: 7.4 FL (ref 5–10.5)
POC ACT LR: 150 SEC
POTASSIUM SERPL-SCNC: 4.9 MMOL/L (ref 3.5–5.1)
POTASSIUM SERPL-SCNC: 5.3 MMOL/L (ref 3.5–5.1)
PROT SERPL-MCNC: 7.8 G/DL (ref 6.4–8.2)
RBC # BLD AUTO: 3.6 M/UL (ref 4.2–5.9)
RBC # BLD AUTO: 3.81 M/UL (ref 4.2–5.9)
SODIUM SERPL-SCNC: 130 MMOL/L (ref 136–145)
SODIUM SERPL-SCNC: 132 MMOL/L (ref 136–145)
TIBC SERPL-MCNC: 230 UG/DL (ref 260–445)
TROPONIN, HIGH SENSITIVITY: 115 NG/L (ref 0–22)
WBC # BLD AUTO: 6.7 K/UL (ref 4–11)
WBC # BLD AUTO: 6.7 K/UL (ref 4–11)

## 2024-07-10 PROCEDURE — C1769 GUIDE WIRE: HCPCS | Performed by: INTERNAL MEDICINE

## 2024-07-10 PROCEDURE — 94761 N-INVAS EAR/PLS OXIMETRY MLT: CPT

## 2024-07-10 PROCEDURE — 2580000003 HC RX 258

## 2024-07-10 PROCEDURE — 6360000004 HC RX CONTRAST MEDICATION: Performed by: INTERNAL MEDICINE

## 2024-07-10 PROCEDURE — 93010 ELECTROCARDIOGRAM REPORT: CPT | Performed by: INTERNAL MEDICINE

## 2024-07-10 PROCEDURE — 2580000003 HC RX 258: Performed by: NURSE PRACTITIONER

## 2024-07-10 PROCEDURE — 36415 COLL VENOUS BLD VENIPUNCTURE: CPT

## 2024-07-10 PROCEDURE — 2709999900 HC NON-CHARGEABLE SUPPLY: Performed by: INTERNAL MEDICINE

## 2024-07-10 PROCEDURE — 93461 R&L HRT ART/VENTRICLE ANGIO: CPT | Performed by: INTERNAL MEDICINE

## 2024-07-10 PROCEDURE — 83735 ASSAY OF MAGNESIUM: CPT

## 2024-07-10 PROCEDURE — 93005 ELECTROCARDIOGRAM TRACING: CPT | Performed by: NURSE PRACTITIONER

## 2024-07-10 PROCEDURE — 85027 COMPLETE CBC AUTOMATED: CPT

## 2024-07-10 PROCEDURE — 2700000000 HC OXYGEN THERAPY PER DAY

## 2024-07-10 PROCEDURE — 99223 1ST HOSP IP/OBS HIGH 75: CPT | Performed by: INTERNAL MEDICINE

## 2024-07-10 PROCEDURE — 3700000000 HC ANESTHESIA ATTENDED CARE: Performed by: INTERNAL MEDICINE

## 2024-07-10 PROCEDURE — 7100000000 HC PACU RECOVERY - FIRST 15 MIN: Performed by: INTERNAL MEDICINE

## 2024-07-10 PROCEDURE — 2500000003 HC RX 250 WO HCPCS: Performed by: NURSE PRACTITIONER

## 2024-07-10 PROCEDURE — 2500000003 HC RX 250 WO HCPCS

## 2024-07-10 PROCEDURE — 2580000003 HC RX 258: Performed by: INTERNAL MEDICINE

## 2024-07-10 PROCEDURE — 6360000002 HC RX W HCPCS

## 2024-07-10 PROCEDURE — 93005 ELECTROCARDIOGRAM TRACING: CPT | Performed by: INTERNAL MEDICINE

## 2024-07-10 PROCEDURE — 85347 COAGULATION TIME ACTIVATED: CPT

## 2024-07-10 PROCEDURE — 6370000000 HC RX 637 (ALT 250 FOR IP)

## 2024-07-10 PROCEDURE — 7100000001 HC PACU RECOVERY - ADDTL 15 MIN: Performed by: INTERNAL MEDICINE

## 2024-07-10 PROCEDURE — B2031ZZ PLAIN RADIOGRAPHY OF MULTIPLE CORONARY ARTERY BYPASS GRAFTS USING LOW OSMOLAR CONTRAST: ICD-10-PCS | Performed by: INTERNAL MEDICINE

## 2024-07-10 PROCEDURE — C1894 INTRO/SHEATH, NON-LASER: HCPCS | Performed by: INTERNAL MEDICINE

## 2024-07-10 PROCEDURE — 3700000001 HC ADD 15 MINUTES (ANESTHESIA): Performed by: INTERNAL MEDICINE

## 2024-07-10 PROCEDURE — 2060000000 HC ICU INTERMEDIATE R&B

## 2024-07-10 PROCEDURE — C1887 CATHETER, GUIDING: HCPCS | Performed by: INTERNAL MEDICINE

## 2024-07-10 PROCEDURE — 80048 BASIC METABOLIC PNL TOTAL CA: CPT

## 2024-07-10 PROCEDURE — 4A023N8 MEASUREMENT OF CARDIAC SAMPLING AND PRESSURE, BILATERAL, PERCUTANEOUS APPROACH: ICD-10-PCS | Performed by: INTERNAL MEDICINE

## 2024-07-10 PROCEDURE — 6370000000 HC RX 637 (ALT 250 FOR IP): Performed by: NURSE PRACTITIONER

## 2024-07-10 PROCEDURE — B2011ZZ PLAIN RADIOGRAPHY OF MULTIPLE CORONARY ARTERIES USING LOW OSMOLAR CONTRAST: ICD-10-PCS | Performed by: INTERNAL MEDICINE

## 2024-07-10 PROCEDURE — 99152 MOD SED SAME PHYS/QHP 5/>YRS: CPT | Performed by: INTERNAL MEDICINE

## 2024-07-10 PROCEDURE — 6370000000 HC RX 637 (ALT 250 FOR IP): Performed by: INTERNAL MEDICINE

## 2024-07-10 RX ORDER — DEXAMETHASONE SODIUM PHOSPHATE 4 MG/ML
INJECTION, SOLUTION INTRA-ARTICULAR; INTRALESIONAL; INTRAMUSCULAR; INTRAVENOUS; SOFT TISSUE PRN
Status: DISCONTINUED | OUTPATIENT
Start: 2024-07-10 | End: 2024-07-10 | Stop reason: SDUPTHER

## 2024-07-10 RX ORDER — ROCURONIUM BROMIDE 10 MG/ML
INJECTION, SOLUTION INTRAVENOUS PRN
Status: DISCONTINUED | OUTPATIENT
Start: 2024-07-10 | End: 2024-07-10 | Stop reason: SDUPTHER

## 2024-07-10 RX ORDER — LORAZEPAM 0.5 MG/1
0.5 TABLET ORAL
Status: DISCONTINUED | OUTPATIENT
Start: 2024-07-10 | End: 2024-07-10 | Stop reason: HOSPADM

## 2024-07-10 RX ORDER — IPRATROPIUM BROMIDE AND ALBUTEROL SULFATE 2.5; .5 MG/3ML; MG/3ML
1 SOLUTION RESPIRATORY (INHALATION) EVERY 4 HOURS PRN
Status: DISCONTINUED | OUTPATIENT
Start: 2024-07-10 | End: 2024-07-19 | Stop reason: HOSPADM

## 2024-07-10 RX ORDER — PREGABALIN 25 MG/1
25 CAPSULE ORAL DAILY
Status: DISCONTINUED | OUTPATIENT
Start: 2024-07-10 | End: 2024-07-19 | Stop reason: HOSPADM

## 2024-07-10 RX ORDER — PHENYLEPHRINE HCL IN 0.9% NACL 1 MG/10 ML
SYRINGE (ML) INTRAVENOUS PRN
Status: DISCONTINUED | OUTPATIENT
Start: 2024-07-10 | End: 2024-07-10 | Stop reason: SDUPTHER

## 2024-07-10 RX ORDER — CLOPIDOGREL BISULFATE 75 MG/1
75 TABLET ORAL DAILY
Status: DISCONTINUED | OUTPATIENT
Start: 2024-07-11 | End: 2024-07-19 | Stop reason: HOSPADM

## 2024-07-10 RX ORDER — IPRATROPIUM BROMIDE AND ALBUTEROL SULFATE 2.5; .5 MG/3ML; MG/3ML
1 SOLUTION RESPIRATORY (INHALATION) ONCE
Status: DISCONTINUED | OUTPATIENT
Start: 2024-07-10 | End: 2024-07-10

## 2024-07-10 RX ORDER — DULOXETIN HYDROCHLORIDE 60 MG/1
60 CAPSULE, DELAYED RELEASE ORAL DAILY
Status: DISCONTINUED | OUTPATIENT
Start: 2024-07-10 | End: 2024-07-19 | Stop reason: HOSPADM

## 2024-07-10 RX ORDER — ONDANSETRON 2 MG/ML
4 INJECTION INTRAMUSCULAR; INTRAVENOUS EVERY 30 MIN PRN
Status: DISCONTINUED | OUTPATIENT
Start: 2024-07-10 | End: 2024-07-10 | Stop reason: HOSPADM

## 2024-07-10 RX ORDER — FAMOTIDINE 10 MG/ML
20 INJECTION, SOLUTION INTRAVENOUS ONCE
Status: CANCELLED | OUTPATIENT
Start: 2024-07-10

## 2024-07-10 RX ORDER — INSULIN GLARGINE 100 [IU]/ML
15 INJECTION, SOLUTION SUBCUTANEOUS NIGHTLY
Status: DISCONTINUED | OUTPATIENT
Start: 2024-07-10 | End: 2024-07-19 | Stop reason: HOSPADM

## 2024-07-10 RX ORDER — ONDANSETRON 2 MG/ML
4 INJECTION INTRAMUSCULAR; INTRAVENOUS EVERY 6 HOURS PRN
Status: DISCONTINUED | OUTPATIENT
Start: 2024-07-10 | End: 2024-07-10 | Stop reason: HOSPADM

## 2024-07-10 RX ORDER — CALCIUM ACETATE 667 MG/1
2 CAPSULE ORAL 3 TIMES DAILY
Status: DISCONTINUED | OUTPATIENT
Start: 2024-07-10 | End: 2024-07-19 | Stop reason: HOSPADM

## 2024-07-10 RX ORDER — OXYCODONE HYDROCHLORIDE 5 MG/1
5 TABLET ORAL PRN
Status: DISCONTINUED | OUTPATIENT
Start: 2024-07-10 | End: 2024-07-10 | Stop reason: HOSPADM

## 2024-07-10 RX ORDER — SODIUM CHLORIDE 0.9 % (FLUSH) 0.9 %
5-40 SYRINGE (ML) INJECTION PRN
Status: DISCONTINUED | OUTPATIENT
Start: 2024-07-10 | End: 2024-07-10 | Stop reason: HOSPADM

## 2024-07-10 RX ORDER — OXYCODONE HYDROCHLORIDE 5 MG/1
10 TABLET ORAL PRN
Status: DISCONTINUED | OUTPATIENT
Start: 2024-07-10 | End: 2024-07-10 | Stop reason: HOSPADM

## 2024-07-10 RX ORDER — LIDOCAINE HYDROCHLORIDE 20 MG/ML
INJECTION, SOLUTION EPIDURAL; INFILTRATION; INTRACAUDAL; PERINEURAL PRN
Status: DISCONTINUED | OUTPATIENT
Start: 2024-07-10 | End: 2024-07-10 | Stop reason: SDUPTHER

## 2024-07-10 RX ORDER — VENLAFAXINE HYDROCHLORIDE 37.5 MG/1
75 CAPSULE, EXTENDED RELEASE ORAL DAILY
Status: DISCONTINUED | OUTPATIENT
Start: 2024-07-10 | End: 2024-07-19 | Stop reason: HOSPADM

## 2024-07-10 RX ORDER — OXYCODONE HYDROCHLORIDE 5 MG/1
10 TABLET ORAL EVERY 4 HOURS PRN
Status: DISCONTINUED | OUTPATIENT
Start: 2024-07-10 | End: 2024-07-19 | Stop reason: HOSPADM

## 2024-07-10 RX ORDER — ASPIRIN 81 MG/1
81 TABLET, CHEWABLE ORAL DAILY
Status: COMPLETED | OUTPATIENT
Start: 2024-07-11 | End: 2024-07-16

## 2024-07-10 RX ORDER — SODIUM CHLORIDE 0.9 % (FLUSH) 0.9 %
5-40 SYRINGE (ML) INJECTION EVERY 12 HOURS SCHEDULED
Status: DISCONTINUED | OUTPATIENT
Start: 2024-07-10 | End: 2024-07-10 | Stop reason: HOSPADM

## 2024-07-10 RX ORDER — SODIUM CHLORIDE 9 MG/ML
INJECTION, SOLUTION INTRAVENOUS PRN
Status: DISCONTINUED | OUTPATIENT
Start: 2024-07-10 | End: 2024-07-10 | Stop reason: HOSPADM

## 2024-07-10 RX ORDER — PANTOPRAZOLE SODIUM 40 MG/10ML
40 INJECTION, POWDER, LYOPHILIZED, FOR SOLUTION INTRAVENOUS 2 TIMES DAILY
Status: DISCONTINUED | OUTPATIENT
Start: 2024-07-10 | End: 2024-07-10

## 2024-07-10 RX ORDER — FENTANYL CITRATE 50 UG/ML
INJECTION, SOLUTION INTRAMUSCULAR; INTRAVENOUS PRN
Status: DISCONTINUED | OUTPATIENT
Start: 2024-07-10 | End: 2024-07-10 | Stop reason: SDUPTHER

## 2024-07-10 RX ORDER — BUSPIRONE HYDROCHLORIDE 5 MG/1
10 TABLET ORAL 3 TIMES DAILY
Status: DISCONTINUED | OUTPATIENT
Start: 2024-07-10 | End: 2024-07-19 | Stop reason: HOSPADM

## 2024-07-10 RX ORDER — METOPROLOL SUCCINATE 25 MG/1
25 TABLET, EXTENDED RELEASE ORAL DAILY
Status: DISCONTINUED | OUTPATIENT
Start: 2024-07-10 | End: 2024-07-16

## 2024-07-10 RX ORDER — PRAVASTATIN SODIUM 40 MG
40 TABLET ORAL DAILY
Status: DISCONTINUED | OUTPATIENT
Start: 2024-07-10 | End: 2024-07-19 | Stop reason: HOSPADM

## 2024-07-10 RX ORDER — QUETIAPINE FUMARATE 25 MG/1
50 TABLET, FILM COATED ORAL NIGHTLY
Status: DISCONTINUED | OUTPATIENT
Start: 2024-07-10 | End: 2024-07-19 | Stop reason: HOSPADM

## 2024-07-10 RX ORDER — SODIUM CHLORIDE 0.9 % (FLUSH) 0.9 %
5-40 SYRINGE (ML) INJECTION PRN
Status: DISCONTINUED | OUTPATIENT
Start: 2024-07-10 | End: 2024-07-15 | Stop reason: SDUPTHER

## 2024-07-10 RX ORDER — ONDANSETRON 2 MG/ML
INJECTION INTRAMUSCULAR; INTRAVENOUS PRN
Status: DISCONTINUED | OUTPATIENT
Start: 2024-07-10 | End: 2024-07-10 | Stop reason: SDUPTHER

## 2024-07-10 RX ORDER — NALOXONE HYDROCHLORIDE 0.4 MG/ML
INJECTION, SOLUTION INTRAMUSCULAR; INTRAVENOUS; SUBCUTANEOUS PRN
Status: DISCONTINUED | OUTPATIENT
Start: 2024-07-10 | End: 2024-07-10 | Stop reason: HOSPADM

## 2024-07-10 RX ORDER — PROPOFOL 10 MG/ML
INJECTION, EMULSION INTRAVENOUS PRN
Status: DISCONTINUED | OUTPATIENT
Start: 2024-07-10 | End: 2024-07-10 | Stop reason: SDUPTHER

## 2024-07-10 RX ORDER — ACETAMINOPHEN 325 MG/1
650 TABLET ORAL EVERY 4 HOURS PRN
Status: DISCONTINUED | OUTPATIENT
Start: 2024-07-10 | End: 2024-07-11 | Stop reason: SDUPTHER

## 2024-07-10 RX ORDER — SODIUM CHLORIDE 9 MG/ML
INJECTION, SOLUTION INTRAVENOUS PRN
Status: DISCONTINUED | OUTPATIENT
Start: 2024-07-10 | End: 2024-07-19 | Stop reason: HOSPADM

## 2024-07-10 RX ORDER — PANTOPRAZOLE SODIUM 40 MG/1
40 TABLET, DELAYED RELEASE ORAL
Status: DISCONTINUED | OUTPATIENT
Start: 2024-07-10 | End: 2024-07-19 | Stop reason: HOSPADM

## 2024-07-10 RX ORDER — GLYCOPYRROLATE 0.2 MG/ML
INJECTION INTRAMUSCULAR; INTRAVENOUS PRN
Status: DISCONTINUED | OUTPATIENT
Start: 2024-07-10 | End: 2024-07-10 | Stop reason: SDUPTHER

## 2024-07-10 RX ORDER — TRAZODONE HYDROCHLORIDE 50 MG/1
50 TABLET ORAL DAILY
Status: DISCONTINUED | OUTPATIENT
Start: 2024-07-10 | End: 2024-07-19 | Stop reason: HOSPADM

## 2024-07-10 RX ORDER — SODIUM CHLORIDE 0.9 % (FLUSH) 0.9 %
5-40 SYRINGE (ML) INJECTION EVERY 12 HOURS SCHEDULED
Status: DISCONTINUED | OUTPATIENT
Start: 2024-07-10 | End: 2024-07-15 | Stop reason: SDUPTHER

## 2024-07-10 RX ORDER — LORAZEPAM 0.5 MG/1
0.5 TABLET ORAL EVERY 6 HOURS PRN
Status: DISCONTINUED | OUTPATIENT
Start: 2024-07-10 | End: 2024-07-10

## 2024-07-10 RX ORDER — SODIUM CHLORIDE 9 MG/ML
INJECTION, SOLUTION INTRAVENOUS CONTINUOUS PRN
Status: DISCONTINUED | OUTPATIENT
Start: 2024-07-10 | End: 2024-07-10 | Stop reason: SDUPTHER

## 2024-07-10 RX ORDER — ASPIRIN 325 MG
325 TABLET ORAL ONCE
Status: COMPLETED | OUTPATIENT
Start: 2024-07-10 | End: 2024-07-10

## 2024-07-10 RX ADMIN — ROCURONIUM BROMIDE 50 MG: 50 INJECTION, SOLUTION INTRAVENOUS at 13:28

## 2024-07-10 RX ADMIN — DEXAMETHASONE SODIUM PHOSPHATE 4 MG: 4 INJECTION, SOLUTION INTRAMUSCULAR; INTRAVENOUS at 13:45

## 2024-07-10 RX ADMIN — OXYCODONE 10 MG: 5 TABLET ORAL at 00:26

## 2024-07-10 RX ADMIN — PROPOFOL 200 MG: 10 INJECTION, EMULSION INTRAVENOUS at 13:26

## 2024-07-10 RX ADMIN — INSULIN GLARGINE 15 UNITS: 100 INJECTION, SOLUTION SUBCUTANEOUS at 22:29

## 2024-07-10 RX ADMIN — FENTANYL CITRATE 50 MCG: 50 INJECTION, SOLUTION INTRAMUSCULAR; INTRAVENOUS at 13:26

## 2024-07-10 RX ADMIN — TRAZODONE HYDROCHLORIDE 50 MG: 50 TABLET ORAL at 22:28

## 2024-07-10 RX ADMIN — TRAZODONE HYDROCHLORIDE 50 MG: 50 TABLET ORAL at 01:04

## 2024-07-10 RX ADMIN — FENTANYL CITRATE 50 MCG: 50 INJECTION, SOLUTION INTRAMUSCULAR; INTRAVENOUS at 13:27

## 2024-07-10 RX ADMIN — Medication 200 MCG: at 13:39

## 2024-07-10 RX ADMIN — ONDANSETRON 4 MG: 2 INJECTION INTRAMUSCULAR; INTRAVENOUS at 13:45

## 2024-07-10 RX ADMIN — BUSPIRONE HYDROCHLORIDE 10 MG: 5 TABLET ORAL at 22:28

## 2024-07-10 RX ADMIN — INSULIN GLARGINE 15 UNITS: 100 INJECTION, SOLUTION SUBCUTANEOUS at 01:04

## 2024-07-10 RX ADMIN — PANTOPRAZOLE SODIUM 40 MG: 40 TABLET, DELAYED RELEASE ORAL at 18:10

## 2024-07-10 RX ADMIN — GLYCOPYRROLATE 0.1 MG: 0.2 INJECTION, SOLUTION INTRAMUSCULAR; INTRAVENOUS at 13:46

## 2024-07-10 RX ADMIN — Medication 10 ML: at 22:32

## 2024-07-10 RX ADMIN — SACUBITRIL AND VALSARTAN 1 TABLET: 24; 26 TABLET, FILM COATED ORAL at 01:04

## 2024-07-10 RX ADMIN — LIDOCAINE HYDROCHLORIDE 100 MG: 20 INJECTION, SOLUTION EPIDURAL; INFILTRATION; INTRACAUDAL; PERINEURAL at 13:26

## 2024-07-10 RX ADMIN — PHENYLEPHRINE HYDROCHLORIDE 40 MCG/MIN: 10 INJECTION INTRAVENOUS at 13:35

## 2024-07-10 RX ADMIN — SODIUM CHLORIDE, PRESERVATIVE FREE 10 ML: 5 INJECTION INTRAVENOUS at 22:28

## 2024-07-10 RX ADMIN — QUETIAPINE FUMARATE 50 MG: 25 TABLET ORAL at 01:04

## 2024-07-10 RX ADMIN — QUETIAPINE FUMARATE 50 MG: 25 TABLET ORAL at 22:28

## 2024-07-10 RX ADMIN — SACUBITRIL AND VALSARTAN 1 TABLET: 24; 26 TABLET, FILM COATED ORAL at 22:28

## 2024-07-10 RX ADMIN — DEXMEDETOMIDINE HYDROCHLORIDE 8 MCG: 100 INJECTION, SOLUTION INTRAVENOUS at 15:08

## 2024-07-10 RX ADMIN — CALCIUM ACETATE 1334 MG: 667 CAPSULE ORAL at 22:28

## 2024-07-10 RX ADMIN — ASPIRIN 325 MG: 325 TABLET ORAL at 09:55

## 2024-07-10 RX ADMIN — PREGABALIN 25 MG: 25 CAPSULE ORAL at 06:02

## 2024-07-10 RX ADMIN — OXYCODONE 10 MG: 5 TABLET ORAL at 22:28

## 2024-07-10 RX ADMIN — OXYCODONE 10 MG: 5 TABLET ORAL at 09:55

## 2024-07-10 RX ADMIN — SODIUM CHLORIDE: 9 INJECTION, SOLUTION INTRAVENOUS at 13:26

## 2024-07-10 ASSESSMENT — PAIN DESCRIPTION - DESCRIPTORS: DESCRIPTORS: ACHING;DISCOMFORT

## 2024-07-10 ASSESSMENT — PAIN SCALES - GENERAL
PAINLEVEL_OUTOF10: 9
PAINLEVEL_OUTOF10: 6
PAINLEVEL_OUTOF10: 9

## 2024-07-10 ASSESSMENT — PAIN DESCRIPTION - LOCATION
LOCATION: BACK;HIP
LOCATION: BACK

## 2024-07-10 NOTE — FLOWSHEET NOTE
07/09/24 0488   Assessment   Charting Type Admission   Psychosocial   Psychosocial (WDL) WDL   Neurological   Neuro (WDL) WDL   [place] Coma Scale   Eye Opening 4   Best Verbal Response 5   Best Motor Response 6   Robert Coma Scale Score 15   HEENT (Head, Ears, Eyes, Nose, & Throat)   HEENT (WDL) WDL   Cardiac   Cardiac (WDL) X   Cardiac Regularity Regular   Heart Sounds S1, S2   Cardiac Rhythm Sinus rhythm   Cardiac Symptoms Chest pain   Cardiac Monitor   Cardiac/Telemetry Monitor On Portable telemetry pack applied   Gastrointestinal   Abdominal (WDL) WDL   RUQ Bowel Sounds Active   LUQ Bowel Sounds Active   RLQ Bowel Sounds Active   LLQ Bowel Sounds Active   Genitourinary   Genitourinary (WDL) WDL   Urine Assessment   Urinary Status Voiding   Urinary Incontinence Absent   Peripheral Vascular   Peripheral Vascular (WDL) WDL   Dual Clinician Skin Assessment   Dual Skin Assessment (4 Eyes) X   Skin Integumentary    Skin Integumentary (WDL) X   Skin Integrity Wound (see LDA)   Musculoskeletal   Musculoskeletal (WDL) X   RL Extremity Ace wrap;Amputation   Incision 04/04/24 Foot Right   Date First Assessed: 04/04/24   Location: Foot  Incision Location Orientation: Right  Incision Description (Comments): transmetatarsal excision of distal foot   Dressing Status Clean;Dry

## 2024-07-10 NOTE — PROGRESS NOTES
V2.0    [facility] Progress Note      Name:  Igor Ann /Age/Sex: 1970  (54 y.o. male)   MRN & CSN:  [id number] & [id number] Encounter Date/Time: 7/10/2024 9:14 AM EDT   Location:   PCP: Vonnie Cleveland APRN - NP     Attending:Naveed Munson MD       Hospital Day: 2    Assessment and Recommendations   Igor Ann is a 54 y.o. male with pmh of COPD, CAD status post 3 stents, ESRD on dialysis, GERD, heart failure, type 2 diabetes, and ZAINAB who presents with Coronary artery disease, unspecified vessel or lesion type, unspecified whether angina present, unspecified whether native or transplanted heart      Plan:   Chest pain/CAD status post 3 stents   -Ramus, left circumflex, right coronary artery stent placed in   -Troponins flat trajectory, EKG with T wave inversions new compared to previous  -Cardiology consulted  -Cath cath today showed restenosis of the ramus, left circumflex, right coronary artery  -Patient received 3 stents in the subsequent arteries listed above  -Will resume Plavix 75 mg and aspirin 81 mg tomorrow  -Continue pravastatin 40 mg  -Eliquis currently on hold    ESRD on dialysis  -Hemodialysis on , last session   -Nephrology has been consulted    Heart failure reduced EF  -Echo 7 5 showed 45 to 50%  -Continue Entresto  -Continue metoprolol  -CHF order set in place    Type 2 diabetes  Continue 15 units nightly  Low-dose sliding scale    GERD  -Possible hematemesis  -GI consulted  -EGD negative  -Will continue pantoprazole 40 mg twice daily  -Holding Eliquis till tomorrow    Chronic normocytic anemia  Continue monitor CBC  No signs of bleeding, holding Eliquis  Secondary to CKD  Patient gets Retacrit    Chronic osteomyelitis  Status post foot I&D with MT bone resection  Patient finished antibiotics and was following with podiatry  Wound care consulted    History of DVT/paroxysmal A-fib.  Sinus rhythm with initial EKG  Will resume  sized perfusion defect involving the anterior wall that appears reversibile consistent with myocardial ischemia. Additionally, there is a large sized perfusion defect involving the inferior wall of moderate intensity with partial reversibility. This is most consistent with prior infarct and darrick-infarct ischemia.   Stress Function: Left ventricular size appears mild-moderately dilated. There is severe global hypokinesis. Left ventricular function is moderate-severely reduced with calculated LVEF of 37%.   Perfusion Conclusion: There is no evidence of transient ischemic dilation (TID).   Image quality is good.   Stress Test: A pharmacological stress test was performed using regadenoson (Lexiscan). 100 mg of aminophylline given as a reversal agent. The patient reported chest discomfort, dizziness and dyspnea during the stress test. Symptoms began during stress and ended during recovery. The patient reached the end of the protocol.   ECG: The stress ECG was not diagnostic due to resting ST-T abnormalities, failure to achieve target heart rate and pharmacologic protocol.     XR CHEST PORTABLE    Result Date: 7/3/2024  EXAMINATION: ONE XRAY VIEW OF THE CHEST 7/3/2024 4:38 pm COMPARISON: Chest x-ray dated 24 May 2024 HISTORY: ORDERING SYSTEM PROVIDED HISTORY: Chest Pain TECHNOLOGIST PROVIDED HISTORY: Reason for exam:->Chest Pain Reason for Exam: chest pain/SOB FINDINGS: Mild stable cardiomegaly.  Left-sided central venous catheter with the tip in the superior vena cava.  No pneumothorax.  Moderate left and small right pleural effusions with bibasilar airspace disease.     Moderate left and small right pleural effusions with bibasilar airspace disease.  This could represent a combination of pneumonia and atelectasis.       CBC:   Recent Labs     07/09/24  0435 07/09/24  2355 07/10/24  0600   WBC  --  6.7 6.7   HGB 8.9* 10.0* 9.5*   PLT  --  309 286     BMP:    Recent Labs     07/09/24  0435 07/09/24  2355 07/10/24  0600

## 2024-07-10 NOTE — PROGRESS NOTES
Assessment completed and medications given. Patient is A&Ox4 and denies any needs at this time. Call light and personal belongings are within reach. Bed is in the lowest and locked position.

## 2024-07-10 NOTE — PROGRESS NOTES
Writer received call from lab. Patient's creatinine elevated 5.3.  Notified Mart Gorman NP of results

## 2024-07-10 NOTE — H&P
Diabetes  - Continue glargine per home regimen  - Low-dose SSI AC/HS  - POCT BG checks AC/HS to monitor for hypoglycemia    GERD  - Had possible hematemesis at [facility]  - EGD negative, GI rec'd BID Protonix IV, cleared for LHC per GI perspective  - Apixaban being held    Review of Systems:    14-point ROS was performed and negative unless stated in HPI or listed below.    Physical Exam Performed:      BP (!) 169/84   Pulse 75   Temp 98 °F (36.7 °C) (Oral)   Resp 17   Wt 95.1 kg (209 lb 10.5 oz)   SpO2 100%   BMI 30.96 kg/m²     General appearance:  No apparent distress, appears stated age and cooperative.  HEENT:  Pupils equal, round, and reactive to light. Conjunctivae/corneas clear.  Respiratory:  Normal respiratory effort. Fine crackles to bilateral bases  Cardiovascular:  Regular rate and rhythm with normal S1/S2 without murmurs, rubs or gallops, +1 pitting edema BLE  Abdomen:  Soft, non-tender, non-distended with normal bowel sounds.  Musculoskelatal:  No clubbing, cyanosis or edema bilaterally.  Full range of motion without deformity.  Neurologic:  Neurovascularly intact without any focal sensory/motor deficits. Cranial nerves: II-XII intact, grossly non-focal.  Psychiatric:  Alert and oriented, thought content appropriate, normal insight  Skin:  Skin color, texture, turgor normal.  No rashes or lesions.  Capillary Refill:  Brisk,< 3 seconds   Peripheral Pulses:  +2 palpable, equal bilaterally     Diet: []Adult/General  []Cardiac  []Diabetic  []Low Fat  []NPO  []NPO after Midnight  [x]Other Clear liquid  DVT Prophylaxis: []PPx LMWH  []Heparin SQ/infusion  [x]IPC/SCDs  [x]Apixaban (when able)  []Rivaroxaban  []Warfarin     Code Status: [x]Full  []DNR/CCA  []Limited (DNR/CCA with Do Not Intubate)  []DNRCC  PT/OT Evaluation Status:   [x]NOT yet ordered  []Ordered and Pending   []Seen with Recommendations for:  []Home independently  []Home w/ assist  []HHC  []SNF  []Acute Rehab    Anticipated Discharge Day/Date:  (OXY-IR) 10 MG immediate release tablet Take 1 tablet by mouth every 6 hours as needed for Pain (surgical wound to r foot).    Gerson Velazquez MD   pregabalin (LYRICA) 25 MG capsule Take 1 capsule by mouth Daily.    Gerson Velazquez MD   venlafaxine (EFFEXOR XR) 75 MG extended release capsule Take 1 capsule by mouth daily    Gerson Velazquez MD   sodium zirconium cyclosilicate (LOKELMA) 10 g PACK oral suspension Take 1 packet by mouth daily as needed    Gerson Velazquez MD   busPIRone (BUSPAR) 15 MG tablet Take 15 mg by mouth Daily 6/4/24   Gerson Velazquez MD   neomycin-bacitracin-polymyxin (NEOSPORIN) 5-400-5000 ointment Apply topically 4 times daily.  Patient not taking: Reported on 7/9/2024 5/11/24   Josie Barrios APRN - CNP   metoprolol succinate (TOPROL XL) 25 MG extended release tablet Take 1 tablet by mouth daily 1/12/24   Efren Lawrence MD   sacubitril-valsartan (ENTRESTO) 24-26 MG per tablet Take 1 tablet by mouth 2 times daily 1/12/24   Efren Lawrence MD   insulin glargine (LANTUS) 100 UNIT/ML injection vial Inject 15 Units into the skin nightly 1/6/24   Gómez Hwang MD   busPIRone (BUSPAR) 10 MG tablet Take 1 tablet by mouth 3 times daily 1/6/24   Gómez Hwang MD   apixaban (ELIQUIS) 5 MG TABS tablet Take 1 tablet by mouth 2 times daily    Gerson Velazquez MD   traZODone (DESYREL) 50 MG tablet Take 1 tablet by mouth daily 4/16/23   Gerson Velazquez MD   DULoxetine (CYMBALTA) 60 MG extended release capsule Take 1 capsule by mouth daily 3/17/23   Jackie Krause MD   pravastatin (PRAVACHOL) 40 MG tablet TAKE 1 TABLET BY MOUTH DAILY 2/7/23   Colleen Herrera, APRN - CNP   QUEtiapine (SEROQUEL) 50 MG tablet TAKE 1 TABLET BY MOUTH EVERY EVENING 6/30/22   Salvador Lugo MD   Calcium Acetate, Phos Binder, 667 MG CAPS TAKE 1 CAPSULE BY MOUTH THREE TIMES DAILY WITH MEALS  Patient taking differently: Take 2 capsules by

## 2024-07-10 NOTE — ANESTHESIA PRE PROCEDURE
06:00 AM    MCV 85.0 07/10/2024 06:00 AM    RDW 17.1 07/10/2024 06:00 AM     07/10/2024 06:00 AM       CMP:   Lab Results   Component Value Date/Time     07/10/2024 06:00 AM    K 5.3 07/10/2024 06:00 AM    K 5.5 07/09/2024 04:35 AM    CL 96 07/10/2024 06:00 AM    CO2 25 07/10/2024 06:00 AM    BUN 56 07/10/2024 06:00 AM    CREATININE 5.7 07/10/2024 06:00 AM    GFRAA 10 10/05/2022 11:53 AM    GFRAA >60 05/17/2013 06:50 AM    AGRATIO 0.9 07/09/2024 11:55 PM    LABGLOM 11 07/10/2024 06:00 AM    LABGLOM 9 04/10/2024 09:25 AM    LABGLOM 12 01/25/2024 12:00 AM    GLUCOSE 99 07/10/2024 06:00 AM    CALCIUM 9.9 07/10/2024 06:00 AM    BILITOT <0.2 07/09/2024 11:55 PM    ALKPHOS 183 07/09/2024 11:55 PM    AST 12 07/09/2024 11:55 PM    ALT 13 07/09/2024 11:55 PM       POC Tests:   Recent Labs     07/10/24  0752   POCGLU 105*       Coags:   Lab Results   Component Value Date/Time    PROTIME 17.4 07/03/2024 04:44 PM    INR 1.40 07/03/2024 04:44 PM    APTT 22.2 09/27/2023 12:14 AM       HCG (If Applicable): No results found for: \"PREGTESTUR\", \"PREGSERUM\", \"HCG\", \"HCGQUANT\"     ABGs:   Lab Results   Component Value Date/Time    PHART 7.308 11/25/2022 11:22 AM    PO2ART 25.6 11/25/2022 11:22 AM    ESV9UZJ 55.0 11/25/2022 11:22 AM    GHO8ZDX 26.9 11/25/2022 11:22 AM    BEART 0.2 11/25/2022 11:22 AM    V9YAIDWT 35.4 11/25/2022 11:22 AM        Type & Screen (If Applicable):  No results found for: \"LABABO\"    Drug/Infectious Status (If Applicable):  No results found for: \"HIV\", \"HEPCAB\"    COVID-19 Screening (If Applicable):   Lab Results   Component Value Date/Time    COVID19 NOT DETECTED 07/03/2024 04:33 PM    COVID19 Not Detected 05/04/2020 03:15 PM           Anesthesia Evaluation    Airway: Mallampati: IV       Mouth opening: < 3 FB   Dental: normal exam         Pulmonary:   (+)       decreased breath sounds wheezes                              Cardiovascular:    (+) valvular problems/murmurs:        Rhythm:

## 2024-07-10 NOTE — PROGRESS NOTES
Patient arrived to PACU bay 4, phase one initiated. Placed on bedside monitor, VSS. Report obtained from OR RN and anesthesia. Patient on O2 via non-rebreather at 15L. Assessment WNL. Warm blankets applied. Side rails in place, will monitor patient closely.

## 2024-07-10 NOTE — ANESTHESIA POSTPROCEDURE EVALUATION
Department of Anesthesiology  Postprocedure Note    Patient: Igor Ann  MRN: [id number]  YOB: 1970  Date of evaluation: 7/10/2024    Procedure Summary       Date: 07/10/24 Room / Location: [facility] CATH LAB 1 / [facility] CARDIAC CATH LAB    Anesthesia Start: 1314 Anesthesia Stop: 1520    Procedure: Left and right heart cath / coronary angiography Diagnosis:       Abnormal stress test      (Abnormal stress test [R94.39])    Providers: Lamberto Pate MD Responsible Provider: Davion Alas MD    Anesthesia Type: general ASA Status: 4            Anesthesia Type: No value filed.    Ernesto Phase I: Ernesto Score: 8    Ernesto Phase II:      Anesthesia Post Evaluation    Comments: Anes Post-op Note    Name:    Igor Ann  MRN:      [id number]    Patient Vitals in the past 12 hrs:  07/10/24 1527, Pulse:77, SpO2:99 %  07/10/24 1520, BP:(!) 150/78, Temp:97 °F (36.1 °C), Temp src:Temporal, Pulse:77, Resp:(!) 0, SpO2:100 %  07/10/24 1357, Height:1.753 m (5' 9\")  07/10/24 0915, BP:137/66, Temp:97.4 °F (36.3 °C), Temp src:Oral, Pulse:64, Resp:16, SpO2:98 %  07/10/24 0600, BP:(!) 152/82, Temp:97.6 °F (36.4 °C), Temp src:Oral, Pulse:61, Resp:17, SpO2:100 %  07/10/24 0550, Weight:95.1 kg (209 lb 10.5 oz)     LABS:    CBC  Lab Results       Component                Value               Date/Time                  WBC                      6.7                 07/10/2024 06:00 AM        HGB                      9.5 (L)             07/10/2024 06:00 AM        HCT                      30.6 (L)            07/10/2024 06:00 AM        PLT                      286                 07/10/2024 06:00 AM   RENAL  Lab Results       Component                Value               Date/Time                  NA                       132 (L)             07/10/2024 06:00 AM        K                        5.3 (H)             07/10/2024 06:00 AM        K                        5.5 (H)             07/09/2024 04:35 AM        CL

## 2024-07-10 NOTE — PROGRESS NOTES
4 Eyes Skin Assessment     NAME:  Igor Ann  YOB: 1970  MEDICAL RECORD NUMBER:  [id number]    The patient is being assessed for  Admission    I agree that at least one RN has performed a thorough Head to Toe Skin Assessment on the patient. ALL assessment sites listed below have been assessed.      Areas assessed by both nurses:    Head, Face, Ears, Shoulders, Back, Chest, Arms, Elbows, Hands, Sacrum. Buttock, Coccyx, Ischium, and Legs. Feet and Heels        Does the Patient have a Wound? Yes wound(s) were present on assessment. LDA wound assessment was Initiated and completed by RN       Isaac Prevention initiated by RN: Yes  Wound Care Orders initiated by RN: Yes    Pressure Injury (Stage 3,4, Unstageable, DTI, NWPT, and Complex wounds) if present, place Wound referral order by RN under : Yes    New Ostomies, if present place, Ostomy referral order under : No     Nurse 1 eSignature: Electronically signed by Cele Guillaume RN on 7/10/24 at 1:58 AM EDT    **SHARE this note so that the co-signing nurse can place an eSignature**    Nurse 2 eSignature: Electronically signed by Marion Taylor RN on 7/10/24 at 6:26 AM EDT

## 2024-07-10 NOTE — DISCHARGE INSTRUCTIONS
Heart Failure Resources:  Heart Failure Interactive Workbook:  Go to https://[email or web address]/publication/?[email or web address] for a Free Heart Failure Interactive Workbook provided by The American [employer] Association. This interactive workbook will provide information on Healthier Living with Heart Failure. Please copy and paste link into search bar. Use your mouse to scroll through the pages.    HF [place] stephanie:   Heart Failure Free smart phone stephanie available for iPhone and Android download. Use your phone to track sodium intake, fluid intake, symptoms, and weight.     Low Sodium Diet / Recipes:  Go to [email or web address] website for “renal” diet which is Low Sodium! [employer] is a dialysis company, but this website offers free seasonal cookbooks. Each quarter, they will release 25-30 new recipes with a breakdown of calories, sodium, and glucose. You can also go to [email or web address] website for free recipes.     Home Exercise Program:   Identification of Green/Yellow/Red zones:  You should be able to identify when you feel good (green zone), if you have 1-2 symptoms of HF (yellow zone), or if you are in need of medical attention (red zone).  In your CHF education folder you were provided a “stop light tool” to outline this information.     We want to you to rate your exertion levels:    Our therapy team has discussed means of identification with you such as the \"John scale.\"  The John rating scale ranges from 6 to 20, where 6 means \"no exertion at all\" and 20 means \"maximal exertion.\" The goal is to use this to gauge how much effort it is taking for you to do your normal daily tasks.   You should be able to recognize when too much exertion is being expended.    Elements of Energy Conservation:   Prioritize/Plan: Decide what needs to be done today, and what can wait for a later date, write to do lists, plan ahead to avoid extra trips, and gather supplies and equipment needed before starting an activity.   Position:  Avoid tiring and awkward posture that may impair breathing.  Pace: Slow and steady pace, never rushing!  Pursed lip breathing.  Pursed Lip Breathing: \"Smell the roses, blow out the candles\".

## 2024-07-10 NOTE — PROGRESS NOTES
Patient admitted to room 440 from [facility]. telemetry box  in place, patient aware of placement and reason.  Bed locked, in lowest position, side rails up 2/4, call light within reach.  Will continue to monitor.

## 2024-07-10 NOTE — CONSULTS
artery  9.  Cutting Balloon angioplasty and drug-eluting stent insertion to the right coronary artery  10. CPT Code: 23778 US guidance for vascular access  NOTE: Ultrasound access was used to access the femoral artery using the modified seldinger technique after local infiltration of 1-2% lidocaine. Ultrasound documented/visualized patency, pulsatility, and proper location for puncture. It determined safety to proceed with access.   Image (s) was printed off US printer and/or sent to medical records for documentation.         Procedure Findings:  1. Severe multi vessel coronary artery diease              ~50% mid LAD              ~90% proximal ramus intermediate branch              ~90% proximal circumflex stenosis              ~95% mid right coronary artery restenosis of previous drug-eluting stents along with 70% proximal atherosclerosis  2.  Reduced left ventricular function with ejection fraction of 30 to 35% and global hypokinesis  3.  Elevated left ventricular end-diastolic pressure at 24.     Indications:       Patient Active Problem List   Diagnosis    Sepsis without acute organ dysfunction ([facility])    CHF (congestive heart failure) ([facility])    Asthma-COPD overlap syndrome ([facility])    CAD in native artery    PVD (peripheral vascular disease) ([facility])    Bicuspid aortic valve    Bilateral hilar adenopathy syndrome    Claudication in peripheral vascular disease ([facility])    Uncontrolled hypertension    Diabetic neuropathy ([facility])    Type 2 DM with hypertension and ESRD on dialysis ([facility])    Passive smoke exposure    Depression with anxiety    PNA (pneumonia)    Obesity (BMI 30-39.9)    ZAINAB on CPAP    Degeneration of lumbar or lumbosacral intervertebral disc    Lumbar radiculopathy    Lumbosacral spondylosis without myelopathy    Biliary colic    Symptomatic cholelithiasis    Gastroparesis due to DM ([facility])    Angina, class IV ([facility])    Dyspnea    Dyslipidemia    Acute on chronic systolic HF (heart failure) ([facility])    Ischemic  determined safety to proceed with access.   Image (s) was printed off US printer and/or sent to medical records for documentation.               We did initially attempt the right radial artery and ulnar artery for possible cannulation.  Ultrasound guidance for vascular access at those sites demonstrated occlusion of the right radial and right ulnar artery.  Furthermore, there were no palpable pulses.  We elected not to proceed with right radial site access.              Catheters used during the procedure included 5 Spanish JL 4 and JR4 catheter. The catheters were advanced and removed over a .035\" wire, into the appropriate positions. Multiple angiographic views were obtained. An LV gram was obtained.              The interventional portion of the procedure was completed utilizing a 6 German system.  For the left coronary PCI, we utilized a 6 German XB 3.5.  Adjunctive pharmacotherapy with aspirin and Plavix was already on board and we initiated Intra-Op periprocedural Angiomax.  Initially a BMW wire was advanced into the distal ramus intermediate branch.  Intravascular ultrasound was completed of this branch which demonstrated critical atherosclerotic disease that was calcific.  We treated this ramus intermediate branch initially with a 3.5 mm x 15 mm noncompliant Euphora balloon and subsequent stented it with a 3.5 mm x 18 mm [place] frontier drug-eluting stent.  The proximal stent appeared to be mildly under deployed we therefore then utilized a 4.0 mm x 8 mm noncompliant Euphora balloon for further treatment.  At this point we did moved our attention to the circumflex.  We had placed a second BMW wire into the LAD to protect that during this PCI.  We attempted to utilize the BMW wire into the circumflex but this would not advance.  We changed out to a run-through wire and were able to advance into the circumflex.  We did a vascular ultrasound of the circumflex.  We also measured probable vessel size and plaque

## 2024-07-10 NOTE — CONSULTS
Department of Cardiovascular & Thoracic Surgery  Consult        DIAGNOSIS: Severe CAD    CHIEF COMPLAINT: Chest pain  History Obtained From: electronic medical record, reason patient could not give history: given general anesthesia and Precedex    HISTORY OF PRESENT ILLNESS:    The patient is a 54 y.o. male with significant past medical history of CVA (2017), ZAINAB on CPAP, multiple MI (most recently 2019), CAD s/p multiple stents (most recently May 2015), ESRD on HD T/T/S, DM II, COPD, CHF, GERD, PVD s/p bilateral iliac stenting on Plavix, TAVR (10/2019 with Dr. Holland), & prior VTE on Eliquis who presented as a transfer from [facility]. Patient initially presented to [facility] on 7/3 with chest pain that radiated into the left arm that improved upon administration of nitro. His BNP was >70k and troponins were 113-112-111. EKG at that time showed question of inferior ischemia as well as some T wave inversion. Patient was ultimately admitted for a chest pain workup. ECHO obtained 7/5 showed EF 45-50% with moderate hypokinesis in the basal anterior admitted anterior segments. Patient also had an abnormal stress test, which showed an EF of 37%. He was transferred to [facility] in order to undergo LHC with Dr. Rio ledezma, which showed multivessel CAD. The cardiothoracic surgery team was consulted for consideration of CABG.    Upon meeting patient, he was groggy in the PACU as he had received general anesthesia and Precedex. He kept falling asleep and thus could not answer how he was feeling at this time.    Past Medical History:        Diagnosis Date    Ambulatory dysfunction     walker for long distances, SOB with distance    Aortic stenosis     echo 2017    Arthritis     hands and hips    Asthma     Bilateral hilar adenopathy syndrome 06/03/2013    CAD (coronary artery disease)     Dr. Javier [facility]    Cardiomyopathy (HCC) 04/19/2019    EF= 43%    CHF (congestive heart failure) ([facility])     Chronic  stenoses.  Proximally there is 60% stenosis.      Left subclavian and LIMA are widely patent     ASSESSMENT AND PLAN:  STS Cardiac Surgery Risk profile: ISOLATED CABG calculated using ECHO results from 4/1  Mortality: 4.83%  Morbidity and Mortality: 19.5%  Permanent Stroke: 1.84%  Renal Failure: N/A  Reoperation: 4.32%  Prolonged Ventilation: 14.5%  Deep Sternal Wound Infection: 0.274%  Long LOS: 14.9%  Short LOS: 14.2%    Clinical Summary       Planned Surgery: Isolated CABG, Urgent, First cardiovascular surgery   Demographics: 54 year old, White, male, 95.1kg, 175.3cm, BMI: 31 kg/m²   Lab Values: Creatinine: 5.7 mg/dL, Hematocrit: 30.6%, WBC Count: 6.7 10³/?L, Platelet Count: 624729 cells/?L   Substance Abuse: Former smoker, Alcohol use: ? 1 drink/week   Risk Factors / Comorbidities: Dialysis, Hypertension, Family Hx of CAD   Pulmonary RF: Moderate CLD, Sleep Apnea   Vascular RF: Cerebrovascular Disease: CVA > 30 days   Cardiac Status: Chronic heart failure, NYHA Class IV, Ejection Fraction = 20%   Coronary Artery Disease: 3 vessels diseased, Angina equivalent, MI: > 21 Days   Valve Disease: Trivial/Trace AR, Mild MR   Prev. Cardiac Interv: Previous PCI: Not at this facility     [id number] Score for Atrial Fibrillation Stroke Risk   Risk   Factors  Component Value   C CHF Yes 1   H HTN Yes 1   A2 Age >= 75 No,  (54 y.o.) 0   D DM Yes 1   S2 Prior Stroke/TIA Yes 2   V Vascular Disease Yes 1   A Age 65-74 No,  (54 y.o.) 0   Sc Sex male 0    [id number]  Score  6   Score last updated 7/10/24 4:07 PM EDT    Click here for a link to the UpToDate guideline \"Atrial Fibrillation: Anticoagulation therapy to prevent embolization    Disclaimer: Risk Score calculation is dependent on accuracy of patient problem list and past encounter diagnosis.    54 y.o. male with significant past medical history of CVA (2017), ZAINAB on CPAP, multiple MI (most recently 2019), CAD s/p multiple stents (most recently May 2015), ESRD on HD

## 2024-07-10 NOTE — PROGRESS NOTES
[email or web address]  Nephrology Follow up Note           Reason for Consult: ESRD  Requesting Physician:  Dr. Lawrence    Sub/interval history    Doing ok  Had angiogram showing multiple lesions  No new symptoms  Did well with dialysis last treatment     ROS: No n/v, no edema   PSFH: No visitor    Scheduled Meds:   busPIRone  10 mg Oral TID    calcium acetate  2 capsule Oral TID    DULoxetine  60 mg Oral Daily    insulin glargine  15 Units SubCUTAneous Nightly    metoprolol succinate  25 mg Oral Daily    pravastatin  40 mg Oral Daily    pregabalin  25 mg Oral Daily    QUEtiapine  50 mg Oral Nightly    sacubitril-valsartan  1 tablet Oral BID    traZODone  50 mg Oral Daily    venlafaxine  75 mg Oral Daily    [START ON 7/11/2024] aspirin  81 mg Oral Daily    [Held by provider] clopidogrel  75 mg Oral Daily    pantoprazole  40 mg Oral BID AC    sodium chloride flush  5-40 mL IntraVENous 2 times per day    sodium chloride flush  5-40 mL IntraVENous 2 times per day    insulin lispro  0-4 Units SubCUTAneous TID WC    insulin lispro  0-4 Units SubCUTAneous Nightly     Continuous Infusions:   sodium chloride      dextrose      sodium chloride       PRN Meds:.oxyCODONE, ipratropium 0.5 mg-albuterol 2.5 mg, sodium chloride flush, sodium chloride, acetaminophen, glucose, dextrose bolus **OR** dextrose bolus, glucagon (rDNA), dextrose, sodium chloride flush, sodium chloride, acetaminophen **OR** acetaminophen, polyethylene glycol, prochlorperazine    History of Present Illness on 7/4/2024:    54 y.o. yo male with PMH of ESRD, CAD status post PCI, CHF, COPD, HTN, DM, CVA and HTN, PAD status post iliac stents who is admitted for chest pain  Patient is over 5 kg from his target weight and nephrology has been asked to see him for dialysis needs  Given his cardiac history, cardiology was consulted and they are planning on stress test and echocardiogram in the morning    Physical exam:   Constitutional:  VITALS:  BP (!) 159/74    Pulse 75   Temp 97 °F (36.1 °C) (Temporal)   Resp 19   Ht 1.753 m (5' 9\")   Wt 95.1 kg (209 lb 10.5 oz)   SpO2 100%   BMI 30.96 kg/m²   Gen: alert, awake  Neck: No JVD  Skin: Unremarkable  Cardiovascular:  S1, S2 without m/r/g   Respiratory: CTA B without w/r/r; on CPAP  Abdomen:  soft, nt, nd,   Extremities: no lower extremity edema  Neuro/Psy: AAoriented times 3 ; moves all 4 ext    Data/  Recent Labs     07/09/24  0435 07/09/24  2355 07/10/24  0600   WBC  --  6.7 6.7   HGB 8.9* 10.0* 9.5*   HCT 27.9* 31.9* 30.6*   MCV  --  83.7 85.0   PLT  --  309 286       Recent Labs     07/08/24  0500 07/09/24  0435 07/09/24  2355 07/10/24  0600   * 132* 130* 132*   K 5.3*  5.3* 5.5*  5.5* 4.9 5.3*   CL 94* 94* 93* 96*   CO2 25 22 24 25   GLUCOSE 142* 158* 144* 99   PHOS 3.1 3.7 2.7  --    MG  --   --  2.10 2.10   BUN 65* 89* 52* 56*   CREATININE 6.4* 7.2* 5.3* 5.7*   LABGLOM 10* 8* 12* 11*         Assessment  -ESRD on HD Tuesday Thursday Saturday at [facility]  -Hyperkalemia  -Anemia  -CKD/MBD  -CHF with reduced EF  -History of CAD  -Chest pain per cardiology   Angiogram with significant CAD, CT surgery has been consulted for possible CABG   -Nausea vomiting with heartburn per primary team    Plan  -HD per TTS schedule   TW of 93 kg   -Retacrit 10K units w HD   -Renal diet  -Serial labs      Thank you for the consultation.  Please do not hesitate to call with questions.    Miranda Neil MD  Office: [phone]  Fax:    [phone]  [place]

## 2024-07-10 NOTE — PLAN OF CARE
CHF Care Plan      Patient's EF (Ejection Fraction) is greater than 40%    Heart Failure Medications:  Diuretics:: None    (One of the following REQUIRED for EF </= 40%/SYSTOLIC FAILURE but MAY be used in EF% >40%/DIASTOLIC FAILURE)        ACE:: None        ARB:: None         ARNI:: Sacubitril/Valsartan-Entresto    (Beta Blockers)  NON- Evidenced Based Beta Blocker (for EF% >40%/DIASTOLIC FAILURE): None    Evidenced Based Beta Blocker::(REQUIRED for EF% <40%/SYSTOLIC FAILURE) Metoprolol SUCCinate- Toprol XL  ...................................................................................................................................................    Failed to redirect to the Timeline version of the [email or web address] SmartLink.      Patient's weights and intake/output reviewed    Daily Weight log at bedside, patient/family participation in use of log: \"yes    Patient's current weight today shows a difference of 2 lbs less than last documented weight.      Intake/Output Summary (Last 24 hours) at 7/10/2024 1626  Last data filed at 7/10/2024 1506  Gross per 24 hour   Intake 740 ml   Output 5 ml   Net 735 ml       Education Booklet Provided: yes    Comorbidities Reviewed Yes    Patient has a past medical history of Ambulatory dysfunction, Aortic stenosis, Arthritis, Asthma, Bilateral hilar adenopathy syndrome, CAD (coronary artery disease), Cardiomyopathy (HCC), CHF (congestive heart failure) ([facility]), Chronic pain, COPD (chronic obstructive pulmonary disease) (HCC), CVA (cerebral vascular accident) ([facility]), Depression, Diabetes mellitus (HCC), Difficult intravenous access, Emphysema of lung (HCC), ESRD (end stage renal disease) on dialysis ([facility]), Fear of needles, Gastric ulcer, GERD (gastroesophageal reflux disease), Heart valve problem, Hemodialysis patient (HCC), History of spinal fracture, History of transcatheter aortic valve replacement (TAVR), Hx of blood clots, Hyperlipidemia, Hypertension, MI (myocardial infarction)

## 2024-07-10 NOTE — PROGRESS NOTES
Brief Pre-Op Note/Sedation Assessment      Igor Ann  1970  [id number]  10:46 AM    Planned Procedure: Cardiac Catheterization Procedure  Post Procedure Plan: Return to same level of care  Consent: I have discussed with the patient and/or the patient representative the indication, alternatives, and the possible risks and/or complications of the planned procedure and the anesthesia methods. The patient and/or patient representative appear to understand and agree to proceed.    DISCUSSION OF CARDIAC CATHETERIZATION PROCEDURES: The procedures, indications, risks and alternatives have been discussed with the patient and, as appropriate, with the patient's guardian . Risks discussed included, but are not limited to, bleeding, development of blood clots/emboli, damage to blood vessels, renal failure, malignant cardiac arrhythmias, stroke, heart attack, emergent coronary bypass surgery, death, dye allergy.  The patient (and guardian as appropriate) expressed understanding of the aforementioned and wished to proceed.    Pt/family understand this is a higher risk procedure, there are higher risks for complications/death.  Having understood r/b/a/o, including options for second opinions on this, pt/family wish to proceed with high risk PCI.  Pt/family understand this may involve gen anaesthesia and support devices and wish to proceed.        Chief Complaint:   Chest Pain/Pressure  NSTEMI      Indications for Cath Procedure:  Presentation:  ACS > 24 hrs  2.  Anginal Classification within 2 weeks:  CCS IV - Inability to perform any activity without angina or angina at rest, i.e., severe limitation  3.  Angina Symptoms Assessment:  Typical Chest Pain  4.  Heart Failure Class within last 2 weeks:  Yes:  Heart Failure Type: Systolic Severity:  Class IV - Symptoms of HF at rest  5.  Cardiovascular Instability:  No    Prior Ischemic Workup/Eval:  Pre-Procedural Medications: Yes: Beta Blockers  2.   Stress Test  Unspecified diseases of blood and blood-forming organs        Surgical History:    Past Surgical History:   Procedure Laterality Date    AORTIC VALVE REPLACEMENT N/A 10/15/2019    TRANSCATHETER AORTIC VALVE REPLACEMENT FEMORAL APPROACH performed by Dion Holland MD at [facility] CVOR    CATHETER REMOVAL Right 07/02/2019    PERITONEAL DIALYSIS CATHETER REMOVAL performed by Remi Kincaid MD at [facility] OR    COLONOSCOPY      COLONOSCOPY  2/29/2015    WNL    CORONARY ANGIOPLASTY WITH STENT PLACEMENT  05/26/2015    CYST REMOVAL  08/14/2013    EXCISION CYSTS, NECK X2 AND ABDOMINAL benign    DIAGNOSTIC CARDIAC CATH LAB PROCEDURE      DIALYSIS FISTULA CREATION Left 10/30/2017    LEFT BRACHIAL CEPHALIC FISTULA    DIALYSIS FISTULA CREATION Left 03/27/2019    LIGATION  AV FISTULA performed by Brenden Rosario MD at [facility] OR    ENDOSCOPY, COLON, DIAGNOSTIC      ESOPHAGOGASTRODUODENOSCOPY  07/08/2024    ESOPHAGOGASTRODUODENOSCOPY BIOPSY    FOOT DEBRIDEMENT Right 05/26/2023    INCISION AND DEBRIDEMENT RIGHT FOOT WITH performed by Cely Rodriguez DPM at [facility] OR    FOOT DEBRIDEMENT Right 05/02/2024    RIGHT FOOT DEBRIDEMENT,  INCISION AND DRAINAGE,  BONE RESECTION,  GRAFT APPLICATION performed by Corrie Berg MD at [facility] OR    FOOT SURGERY Right 05/26/2023    RIGHT FIFTH RAY AMPUTATION performed by Cely Rodriguez DPM at [facility] OR    FOOT SURGERY Right 04/04/2024    RIGHT TRANSMETATARSAL AMPUTATION performed by Corrie Berg MD at [facility] OR    IR TUNNELED CATHETER PLACEMENT GREATER THAN 5 YEARS  03/21/2022    IR TUNNELED CATHETER PLACEMENT GREATER THAN 5 YEARS 3/21/2022 [facility] SPECIAL PROCEDURES    IR TUNNELED CATHETER PLACEMENT GREATER THAN 5 YEARS  04/21/2022    IR TUNNELED CATHETER PLACEMENT GREATER THAN 5 YEARS 4/21/2022 [facility] SPECIAL PROCEDURES    IR TUNNELED CATHETER PLACEMENT GREATER THAN 5 YEARS  04/26/2022    IR TUNNELED CATHETER PLACEMENT GREATER THAN 5 YEARS 4/26/2022 [facility] SPECIAL PROCEDURES    IR TUNNELED

## 2024-07-10 NOTE — PROCEDURES
CARDIAC CATHETERIZATION REPORT     Procedure Date:  7/10/2024  Patient Name: Igor Ann  MRN: [id number] : 1970        INDICATION     Ischemic cardiomyopathy  Heart failure  Non-STEMI  Abnormal stress test    PROCEDURES PERFORMED     Right heart catheterization  Left heart catheterization  LVgram  Coronary angiogam  Coronary cath  LIMA angiogram        PROCEDURE DESCRIPTION   Immediately before procedure, sedation assessment was performed again and prior findings as per sedation note (see that note for details) are unchanged. Risks/benefits/alternatives/outcomes were discussed with patient and/or family and informed consent was obtained.  Patient was prepped draped in the usual sterile fashion.  Local anaesthetic was applied over puncture sites.  Using fluoroscopic and ultrasound guidance with a micropuncture kit, 5 Panamanian sheath was inserted to the right common femoral vein and 5/6 Terumo slender sheath was inserted into the right common femoral artery.  5 Panamanian PA catheter was used with [place]-Seema wire to perform right heart catheterization this was then removed.  At the end of the procedure the venous sheath was removed and manual pressure was applied until hemostasis was obtained.  5 Panamanian pigtail, JL 4, 3 DRC, AR-1 catheters were utilized for coronary angiography, pigtail was used for left heart catheterization and LV gram as well as pullback, JL 4 was utilized for left-sided cor angiography and 3 DRC was taken but only partial RCA angiography could be obtained with this and better angiography was obtained with AR 1 catheter.  Coronary cannulation was difficult due to prior TAVR.  Sedation was provided by the anesthesiology service, see their notes for full details there were no immediate complications.  EBL <20cc.   Pt declined to have me call family/friends to provide updates on findings/plans.         FINDINGS     HEMODYNAMICS    CHAMBER PRESSURE SATURATION   RA 11 71%   RV 72/10     PA 71/24 73%   PW 20 with V waves to 29    AORTA 169/66 100%     NANCY CARDIAC OUTPUT 8.1  L/min                 LVGRAM    LVEDP 22   GRADIENT ACROSS AORTIC VAVLE Less than 10 mmHg   LV FUNCTION EF 20%   WALL MOTION Severe global hypokinesis with regional variation   MITRAL REGURGITATION Mild to moderate         CORONARY ARTERIES    LM Less than 10% [id number]-tri-[id number] stenosis       LAD Heavily calcified, there are stents within the LAD and diagonal artery, proximally there is 50% stenosis and in the mid-- distal segment there is a discrete area of 70% eccentric stenosis.    D1 is a medium sized vessel, it is the largest diagonal branch, it is stented (this was described as a ramus previously although it is a high diag), there is eccentric 50 to 75% stenosis in the proximal-mid segment of this vessel, it is more prominent in the mid segment where there is significant ISR.      D2 is a small to medium sized vessel with proximal-mid 50 to 60% stenosis.       LCX At least moderately tortuous with a retroflexed from the left main course with heavy calcification and probable stents in the proximal segment with eccentric 60% in the proximal/mid segment and 80% distal stenosis.       RI See above regarding high diagonal artery branch       RCA Dominant, heavily calcified, stents are noted throughout, there is pmid-distal serial/tandem 90% stenoses.  Proximally there is 60% stenosis.     Left subclavian and LIMA are widely patent        CONCLUSIONS:     MV cad/ashd  Refer to CT surgery consideration of CABG    If not felt to be a candidate for CABG, or per pt preference, can consider high risk MV PCI however comorbidities may limit that and I suspect CABG is likely his best option, there are concerns about medication compliance as well which may limit revascularization options

## 2024-07-10 NOTE — CONSULTS
Comprehensive Nutrition Assessment    Type and Reason for Visit:  Initial, Consult    Nutrition Recommendations/Plan:   Advance diet as medically feasible to low K, NIDA   Add Nepro BID following diet advancement   Monitor diet education needs   Monitor nutrition adequacy, pertinent labs, bowel habits, wt changes, and clinical progress     Malnutrition Assessment:  Malnutrition Status:  At risk for malnutrition (07/10/24 1405)      Nutrition Assessment:    53 yo male admitted with CAD, transfer from [place]. S/p EGD on 7/8 with esophagitis and duodenitis. Hx of ESRD on HD, DM, CHF, and PVD. Consulted for CHF nutrition education. Pt unavailable this date, heart cath today. Currently NPO for procedure. On previous admissions, pt required ONS to consume adequate nutrition. RD to add Nepro when diet advances. [place] int[place] [facility] Will monitor and offer diet education per pt and RD availability.    Nutrition Related Findings:    Active BS. Na 132. K 5.3. Wound Type: Surgical Incision       Current Nutrition Intake & Therapies:    Average Meal Intake: NPO  Average Supplements Intake: NPO  Diet NPO Exceptions are: Sips of Water with Meds    Anthropometric Measures:  Height: 175.3 cm (5' 9\")  Ideal Body Weight (IBW): 160 lbs (73 kg)       Current Body Weight: 94.8 kg (209 lb), 130.6 % IBW. Weight Source: Bed Scale  Current BMI (kg/m2): 30.8  Usual Body Weight: 98.4 kg (217 lb) (4/11/24 bed scale)  % Weight Change (Calculated): -3.7                    BMI Categories: Obese Class 1 (BMI 30.0-34.9)    Estimated Daily Nutrient Needs:  Energy Requirements Based On: Kcal/kg (25-30)  Weight Used for Energy Requirements: Ideal  Energy (kcal/day): 5096-7995 kcal  Weight Used for Protein Requirements: Ideal (1.0-1.2 g/kg)  Protein (g/day): 73-88 g     Fluid (ml/day): Less than 2000 mL per CHF recommendations    Nutrition Diagnosis:   Inadequate oral intake related to inadequate protein-energy intake as evidenced by

## 2024-07-10 NOTE — PROGRESS NOTES
Oswaldo Gorman NP    Patient requesting his home dose of Oxy-IR 10 mg Q 6 hrs for chronic back pain.

## 2024-07-11 ENCOUNTER — APPOINTMENT (OUTPATIENT)
Dept: CT IMAGING | Age: 54
DRG: 286 | End: 2024-07-11
Attending: INTERNAL MEDICINE
Payer: MEDICARE

## 2024-07-11 ENCOUNTER — APPOINTMENT (OUTPATIENT)
Dept: VASCULAR LAB | Age: 54
DRG: 286 | End: 2024-07-11
Attending: INTERNAL MEDICINE
Payer: MEDICARE

## 2024-07-11 ENCOUNTER — HOSPITAL ENCOUNTER (OUTPATIENT)
Dept: VASCULAR LAB | Age: 54
Discharge: HOME | End: 2024-07-11

## 2024-07-11 LAB
ALBUMIN SERPL-MCNC: 3.5 G/DL (ref 3.4–5)
ANION GAP SERPL CALCULATED.3IONS-SCNC: 13 MMOL/L (ref 3–16)
BUN SERPL-MCNC: 79 MG/DL (ref 7–20)
CALCIUM SERPL-MCNC: 8.8 MG/DL (ref 8.3–10.6)
CHLORIDE SERPL-SCNC: 93 MMOL/L (ref 99–110)
CO2 SERPL-SCNC: 23 MMOL/L (ref 21–32)
CREAT SERPL-MCNC: 7.1 MG/DL (ref 0.9–1.3)
DEPRECATED RDW RBC AUTO: 17.4 % (ref 12.4–15.4)
EKG ATRIAL RATE: 76 BPM
EKG DIAGNOSIS: NORMAL
EKG P AXIS: 71 DEGREES
EKG P-R INTERVAL: 208 MS
EKG Q-T INTERVAL: 400 MS
EKG QRS DURATION: 118 MS
EKG QTC CALCULATION (BAZETT): 450 MS
EKG R AXIS: 53 DEGREES
EKG T AXIS: 20 DEGREES
EKG VENTRICULAR RATE: 76 BPM
GFR SERPLBLD CREATININE-BSD FMLA CKD-EPI: 8 ML/MIN/{1.73_M2}
GLUCOSE BLD-MCNC: 159 MG/DL (ref 70–99)
GLUCOSE BLD-MCNC: 199 MG/DL (ref 70–99)
GLUCOSE SERPL-MCNC: 166 MG/DL (ref 70–99)
HCT VFR BLD AUTO: 27 % (ref 40.5–52.5)
HGB BLD-MCNC: 8.5 G/DL (ref 13.5–17.5)
MAGNESIUM SERPL-MCNC: 2.1 MG/DL (ref 1.8–2.4)
MCH RBC QN AUTO: 26.3 PG (ref 26–34)
MCHC RBC AUTO-ENTMCNC: 31.4 G/DL (ref 31–36)
MCV RBC AUTO: 83.7 FL (ref 80–100)
PERFORMED ON: ABNORMAL
PERFORMED ON: ABNORMAL
PHOSPHATE SERPL-MCNC: 4.5 MG/DL (ref 2.5–4.9)
PLATELET # BLD AUTO: 273 K/UL (ref 135–450)
PMV BLD AUTO: 7.7 FL (ref 5–10.5)
POTASSIUM SERPL-SCNC: 6.1 MMOL/L (ref 3.5–5.1)
RBC # BLD AUTO: 3.23 M/UL (ref 4.2–5.9)
SODIUM SERPL-SCNC: 129 MMOL/L (ref 136–145)
VAS LEFT CCA DIST EDV: 9.7 CM/S
VAS LEFT CCA DIST PSV: 49.2 CM/S
VAS LEFT CCA MID EDV: 15.4 CM/S
VAS LEFT CCA MID PSV: 68 CM/S
VAS LEFT CCA PROX EDV: 10.2 CM/S
VAS LEFT CCA PROX PSV: 86.2 CM/S
VAS LEFT ECA EDV: 7.9 CM/S
VAS LEFT ECA PSV: 64.5 CM/S
VAS LEFT GSV AT KNEE DIAM: 1.4 MM
VAS LEFT GSV BK DIST DIAM: 1.3 MM
VAS LEFT GSV BK MID DIAM: 1.1 MM
VAS LEFT GSV BK PROX DIAM: 1.8 MM
VAS LEFT GSV JUNC DIAM: 5.6 MM
VAS LEFT GSV THIGH DIST DIAM: 1.6 MM
VAS LEFT GSV THIGH MID DIAM: 1.3 MM
VAS LEFT GSV THIGH PROX DIAM: 1.5 MM
VAS LEFT ICA DIST EDV: 23 CM/S
VAS LEFT ICA DIST PSV: 83.9 CM/S
VAS LEFT ICA MID EDV: 17.1 CM/S
VAS LEFT ICA MID PSV: 56.2 CM/S
VAS LEFT ICA PROX EDV: 12.3 CM/S
VAS LEFT ICA PROX PSV: 50 CM/S
VAS LEFT ICA/CCA PSV: 1.23
VAS LEFT VERTEBRAL EDV: 9.02 CM/S
VAS LEFT VERTEBRAL PSV: 29.8 CM/S
VAS RIGHT CCA DIST EDV: 11 CM/S
VAS RIGHT CCA DIST PSV: 53.3 CM/S
VAS RIGHT CCA MID EDV: 11 CM/S
VAS RIGHT CCA MID PSV: 54.5 CM/S
VAS RIGHT CCA PROX EDV: 10.6 CM/S
VAS RIGHT CCA PROX PSV: 72.7 CM/S
VAS RIGHT ECA EDV: 8.62 CM/S
VAS RIGHT ECA PSV: 53.7 CM/S
VAS RIGHT GSV ANKLE DIAM: 1.8 MM
VAS RIGHT GSV AT KNEE DIAM: 1.2 MM
VAS RIGHT GSV BK DIST DIAM: 1.4 MM
VAS RIGHT GSV BK MID DIAM: 1.3 MM
VAS RIGHT GSV BK PROX DIAM: 1.1 MM
VAS RIGHT GSV JUNC DIAM: 8.8 MM
VAS RIGHT GSV THIGH DIST DIAM: 1.4 MM
VAS RIGHT GSV THIGH MID DIAM: 1.3 MM
VAS RIGHT GSV THIGH PROX DIAM: 1.5 MM
VAS RIGHT ICA DIST EDV: 20.3 CM/S
VAS RIGHT ICA DIST PSV: 76.3 CM/S
VAS RIGHT ICA MID EDV: 19.8 CM/S
VAS RIGHT ICA MID PSV: 67.5 CM/S
VAS RIGHT ICA PROX EDV: 17.2 CM/S
VAS RIGHT ICA PROX PSV: 60.8 CM/S
VAS RIGHT ICA/CCA PSV: 1.41
VAS RIGHT SUBCLAVIAN PROX PSV: 135 CM/S
VAS RIGHT VERTEBRAL EDV: 11.3 CM/S
VAS RIGHT VERTEBRAL PSV: 49.1 CM/S
WBC # BLD AUTO: 7.2 K/UL (ref 4–11)

## 2024-07-11 PROCEDURE — 2060000000 HC ICU INTERMEDIATE R&B

## 2024-07-11 PROCEDURE — 2700000000 HC OXYGEN THERAPY PER DAY

## 2024-07-11 PROCEDURE — 80069 RENAL FUNCTION PANEL: CPT

## 2024-07-11 PROCEDURE — 85027 COMPLETE CBC AUTOMATED: CPT

## 2024-07-11 PROCEDURE — 6370000000 HC RX 637 (ALT 250 FOR IP)

## 2024-07-11 PROCEDURE — 83735 ASSAY OF MAGNESIUM: CPT

## 2024-07-11 PROCEDURE — 94010 BREATHING CAPACITY TEST: CPT

## 2024-07-11 PROCEDURE — 2500000003 HC RX 250 WO HCPCS: Performed by: NURSE PRACTITIONER

## 2024-07-11 PROCEDURE — 5A09557 ASSISTANCE WITH RESPIRATORY VENTILATION, GREATER THAN 96 CONSECUTIVE HOURS, CONTINUOUS POSITIVE AIRWAY PRESSURE: ICD-10-PCS | Performed by: INTERNAL MEDICINE

## 2024-07-11 PROCEDURE — 6360000002 HC RX W HCPCS: Performed by: INTERNAL MEDICINE

## 2024-07-11 PROCEDURE — 5A1D70Z PERFORMANCE OF URINARY FILTRATION, INTERMITTENT, LESS THAN 6 HOURS PER DAY: ICD-10-PCS | Performed by: INTERNAL MEDICINE

## 2024-07-11 PROCEDURE — 71250 CT THORAX DX C-: CPT

## 2024-07-11 PROCEDURE — 2580000003 HC RX 258: Performed by: INTERNAL MEDICINE

## 2024-07-11 PROCEDURE — 94761 N-INVAS EAR/PLS OXIMETRY MLT: CPT

## 2024-07-11 PROCEDURE — 93880 EXTRACRANIAL BILAT STUDY: CPT | Performed by: SURGERY

## 2024-07-11 PROCEDURE — 93971 EXTREMITY STUDY: CPT | Performed by: SURGERY

## 2024-07-11 PROCEDURE — 99233 SBSQ HOSP IP/OBS HIGH 50: CPT | Performed by: INTERNAL MEDICINE

## 2024-07-11 PROCEDURE — 94660 CPAP INITIATION&MGMT: CPT

## 2024-07-11 PROCEDURE — 93010 ELECTROCARDIOGRAM REPORT: CPT | Performed by: INTERNAL MEDICINE

## 2024-07-11 PROCEDURE — 6370000000 HC RX 637 (ALT 250 FOR IP): Performed by: NURSE PRACTITIONER

## 2024-07-11 PROCEDURE — 36415 COLL VENOUS BLD VENIPUNCTURE: CPT

## 2024-07-11 PROCEDURE — 6360000002 HC RX W HCPCS

## 2024-07-11 PROCEDURE — 90935 HEMODIALYSIS ONE EVALUATION: CPT

## 2024-07-11 PROCEDURE — 93880 EXTRACRANIAL BILAT STUDY: CPT

## 2024-07-11 PROCEDURE — 6360000002 HC RX W HCPCS: Performed by: STUDENT IN AN ORGANIZED HEALTH CARE EDUCATION/TRAINING PROGRAM

## 2024-07-11 PROCEDURE — 93970 EXTREMITY STUDY: CPT

## 2024-07-11 RX ORDER — HEPARIN SODIUM 5000 [USP'U]/ML
5000 INJECTION, SOLUTION INTRAVENOUS; SUBCUTANEOUS EVERY 8 HOURS SCHEDULED
Status: DISCONTINUED | OUTPATIENT
Start: 2024-07-11 | End: 2024-07-19 | Stop reason: HOSPADM

## 2024-07-11 RX ORDER — HEPARIN SODIUM 1000 [USP'U]/ML
INJECTION, SOLUTION INTRAVENOUS; SUBCUTANEOUS
Status: COMPLETED
Start: 2024-07-11 | End: 2024-07-11

## 2024-07-11 RX ORDER — HEPARIN SODIUM 1000 [USP'U]/ML
3600 INJECTION, SOLUTION INTRAVENOUS; SUBCUTANEOUS PRN
Status: DISCONTINUED | OUTPATIENT
Start: 2024-07-11 | End: 2024-07-19 | Stop reason: HOSPADM

## 2024-07-11 RX ADMIN — VANCOMYCIN HYDROCHLORIDE 1500 MG: 10 INJECTION, POWDER, LYOPHILIZED, FOR SOLUTION INTRAVENOUS at 09:59

## 2024-07-11 RX ADMIN — CALCIUM ACETATE 1334 MG: 667 CAPSULE ORAL at 13:53

## 2024-07-11 RX ADMIN — HEPARIN SODIUM 5000 UNITS: 5000 INJECTION INTRAVENOUS; SUBCUTANEOUS at 20:12

## 2024-07-11 RX ADMIN — SACUBITRIL AND VALSARTAN 1 TABLET: 24; 26 TABLET, FILM COATED ORAL at 20:12

## 2024-07-11 RX ADMIN — METOPROLOL SUCCINATE 25 MG: 25 TABLET, EXTENDED RELEASE ORAL at 13:55

## 2024-07-11 RX ADMIN — CALCIUM ACETATE 1334 MG: 667 CAPSULE ORAL at 20:12

## 2024-07-11 RX ADMIN — SACUBITRIL AND VALSARTAN 1 TABLET: 24; 26 TABLET, FILM COATED ORAL at 13:53

## 2024-07-11 RX ADMIN — BUSPIRONE HYDROCHLORIDE 10 MG: 5 TABLET ORAL at 20:13

## 2024-07-11 RX ADMIN — EPOETIN ALFA-EPBX 10000 UNITS: 10000 INJECTION, SOLUTION INTRAVENOUS; SUBCUTANEOUS at 09:51

## 2024-07-11 RX ADMIN — VENLAFAXINE HYDROCHLORIDE 75 MG: 37.5 CAPSULE, EXTENDED RELEASE ORAL at 13:54

## 2024-07-11 RX ADMIN — HEPARIN SODIUM 5000 UNITS: 5000 INJECTION INTRAVENOUS; SUBCUTANEOUS at 15:10

## 2024-07-11 RX ADMIN — OXYCODONE 10 MG: 5 TABLET ORAL at 13:54

## 2024-07-11 RX ADMIN — HEPARIN SODIUM 3600 UNITS: 1000 INJECTION, SOLUTION INTRAVENOUS; SUBCUTANEOUS at 09:52

## 2024-07-11 RX ADMIN — ASPIRIN 81 MG 81 MG: 81 TABLET ORAL at 13:55

## 2024-07-11 RX ADMIN — TRAZODONE HYDROCHLORIDE 50 MG: 50 TABLET ORAL at 20:12

## 2024-07-11 RX ADMIN — OXYCODONE 10 MG: 5 TABLET ORAL at 20:13

## 2024-07-11 RX ADMIN — HEPARIN SODIUM 3600 UNITS: 1000 INJECTION INTRAVENOUS; SUBCUTANEOUS at 09:52

## 2024-07-11 RX ADMIN — PANTOPRAZOLE SODIUM 40 MG: 40 TABLET, DELAYED RELEASE ORAL at 14:55

## 2024-07-11 RX ADMIN — INSULIN GLARGINE 15 UNITS: 100 INJECTION, SOLUTION SUBCUTANEOUS at 20:13

## 2024-07-11 RX ADMIN — BUSPIRONE HYDROCHLORIDE 10 MG: 5 TABLET ORAL at 13:55

## 2024-07-11 RX ADMIN — DULOXETINE HYDROCHLORIDE 60 MG: 60 CAPSULE, DELAYED RELEASE ORAL at 13:55

## 2024-07-11 RX ADMIN — PRAVASTATIN SODIUM 40 MG: 40 TABLET ORAL at 14:00

## 2024-07-11 RX ADMIN — QUETIAPINE FUMARATE 50 MG: 25 TABLET ORAL at 20:12

## 2024-07-11 ASSESSMENT — PAIN DESCRIPTION - PAIN TYPE: TYPE: CHRONIC PAIN

## 2024-07-11 ASSESSMENT — PAIN SCALES - GENERAL
PAINLEVEL_OUTOF10: 10
PAINLEVEL_OUTOF10: 9
PAINLEVEL_OUTOF10: 8
PAINLEVEL_OUTOF10: 10

## 2024-07-11 ASSESSMENT — PAIN DESCRIPTION - LOCATION
LOCATION: BACK
LOCATION: BACK
LOCATION: HIP;BACK

## 2024-07-11 ASSESSMENT — PAIN DESCRIPTION - ORIENTATION: ORIENTATION: LEFT;RIGHT;MID;POSTERIOR

## 2024-07-11 ASSESSMENT — PAIN DESCRIPTION - DESCRIPTORS: DESCRIPTORS: ACHING;DISCOMFORT;THROBBING

## 2024-07-11 ASSESSMENT — PAIN DESCRIPTION - FREQUENCY: FREQUENCY: CONTINUOUS

## 2024-07-11 ASSESSMENT — PAIN DESCRIPTION - ONSET: ONSET: ON-GOING

## 2024-07-11 NOTE — PROGRESS NOTES
07/11/24 0339   NIV Type   $NIV $Daily Charge   NIV Started/Stopped On   Equipment Type v60   Mode Bilevel   Mask Type Full face mask   Mask Size Medium   Assessment   Comfort Level Good   Using Accessory Muscles No   Mask Compliance Good   Skin Assessment Clean, dry, & intact   Skin Protection for O2 Device Yes   Orientation Middle   Location Nose   Intervention(s) Skin Barrier   Settings/Measurements   PIP Observed 13 cm H20   IPAP 12 cmH20   CPAP/EPAP 6 cmH2O   Vt (Measured) 465 mL   Rate Ordered 12   Insp Rise Time (%) 2 %   FiO2  40 %   Minute Volume (L/min) 11.6 Liters   Mask Leak (lpm) 22 lpm   Patient's Home Machine No   Alarm Settings   Alarms On Y   Low Pressure (cmH2O) 6 cmH2O   High Pressure (cmH2O) 30 cmH2O   Apnea (secs) 20 secs   RR Low (bpm) 6   RR High (bpm) 45 br/min

## 2024-07-11 NOTE — CARE COORDINATION
Case Management Assessment  Initial Evaluation    Date/Time of Evaluation: 7/11/2024 2:26 PM  Assessment Completed by: Mady Miranda RN    If patient is discharged prior to next notation, then this note serves as note for discharge by case management.    Patient Name: Igor Ann                   YOB: 1970  Diagnosis: Coronary artery disease, unspecified vessel or lesion type, unspecified whether angina present, unspecified whether native or transplanted heart [I25.10]                   Date / Time: 7/9/2024 10:59 PM    Patient Admission Status: Inpatient   Readmission Risk (Low < 19, Mod (19-27), High > 27): Readmission Risk Score: 43.8    Current PCP: Vonnie Cleveland APRN - NP  PCP verified by CM? Yes    Chart Reviewed: Yes      History Provided by: Patient  Patient Orientation: Alert and Oriented    Patient Cognition: Alert    Hospitalization in the last 30 days (Readmission):  Yes    If yes, Readmission Assessment in CM Navigator will be completed.    Advance Directives:      Code Status: Full Code   Patient's Primary Decision Maker is: Named in Scanned ACP Document    Primary Decision Maker: Joya Land - Step Child - [phone]    Secondary Decision Maker: Sridevi Salmeron - Brother/Sister - [phone]    Supplemental (Other) Decision Maker: Brianna Reyes - Step Child - [phone]    Discharge Planning:    Patient lives with: Other (Comment) ([facility]) Type of Home: Long-Term Care  Primary Care Giver: Other (Comment)  Patient Support Systems include: Spouse/Significant Other, Family Members   Current Financial resources: Medicare  Current community resources: ECF/Home Care  Current services prior to admission: Extended Care Facility            Current DME:              Type of Home Care services:  None    ADLS  Prior functional level: Assistance with the following:, Cooking, Housework, Mobility, Shopping  Current functional level: Assistance with the following:, Cooking, Housework,      The Plan for Transition of Care is related to the following treatment goals of Coronary artery disease, unspecified vessel or lesion type, unspecified whether angina present, unspecified whether native or transplanted heart [I25.10]    IF APPLICABLE: The Patient and/or patient representative Igor and his family were provided with a choice of provider and agrees with the discharge plan. Freedom of choice list with basic dialogue that supports the patient's individualized plan of care/goals and shares the quality data associated with the providers was provided to: Patient   Patient Representative Name:       The Patient and/or Patient Representative Agree with the Discharge Plan? Yes    Mady Miranda RN  Case Management Department  Ph: [phone] Fax: [phone]

## 2024-07-11 NOTE — ANESTHESIA POSTPROCEDURE EVALUATION
Department of Anesthesiology  Postprocedure Note    Patient: Igor Ann  MRN: [id number]  YOB: 1970  Date of evaluation: 7/11/2024    Procedure Summary       Date: 07/10/24 Room / Location: [facility] CATH LAB 1 / [facility] CARDIAC CATH LAB    Anesthesia Start: 1314 Anesthesia Stop: 1520    Procedure: Left and right heart cath / coronary angiography Diagnosis:       Abnormal stress test      (Abnormal stress test [R94.39])    Providers: Lamberto Pate MD Responsible Provider: Davion Alas MD    Anesthesia Type: general ASA Status: 4            Anesthesia Type: No value filed.    Ernesto Phase I: Ernesto Score: 8    Ernesto Phase II:      Anesthesia Post Evaluation    Patient location during evaluation: PACU  Level of consciousness: awake  Airway patency: patent  Nausea & Vomiting: no vomiting  Cardiovascular status: blood pressure returned to baseline  Respiratory status: acceptable  Hydration status: stable  Pain management: adequate    There were no known notable events for this encounter.

## 2024-07-11 NOTE — CONSULTS
Here for wound care evaluation of right foot.  Patient reports he was told by Dr Berg that she is the one to be changing his dressing.  Patient then reports he has been seeing her every 3-4 days.  He cannot remember when he saw the MD last.  Currently he had a roll guaze dressing and coban wrap on the right foot.    Call to Dr Berg office.  Reported he was in to see the MD last on 6/27/24.  Dr Berg would like a referral to see patient.  Dr ERICA Rodriguez is rounding today and will see this pm or tomorrow.    Text to Dr Munson:  Here for wound care eval.  Hx of TMA right foot and graft application.  He has not followed up to podiatry since 6/27/24.  Spoke to Dr Berg.  She wants on call Dr ERICA Rodriguez to see today or in am as she reported once Coban wrap removed he may need some bed side debriding.      Per Dr Munson OK for Order to podiatry and order then placed for podiatry. Wound care to follow peripherally.

## 2024-07-11 NOTE — ACP (ADVANCE CARE PLANNING)
Advance Care Planning     General Advance Care Planning (ACP) Conversation    Date of Conversation: 7/11/2024  Conducted with: Patient with Decision Making Capacity  Other persons present: Spouse      Healthcare Decision Maker:   Primary Decision Maker: Megha Landa - Step Child - [phone]    Secondary Decision Maker: Sridevi Salmeron - Brother/Sister - [phone]    Supplemental (Other) Decision Maker: FranklinncBrianna guzman - Step Child - [phone]  Click here to complete Healthcare Decision Makers including selection of the Healthcare Decision Maker Relationship (ie \"Primary\").   Today we documented Decision Maker(s) consistent with ACP documents on file.    Content/Action Overview:  Has ACP document(s) on file - reflects the patient's care preferences  Remains full code    Length of Voluntary ACP Conversation in minutes:  <16 minutes (Non-Billable)    Mady Miranda RN

## 2024-07-11 NOTE — PLAN OF CARE
Problem: Safety - Adult  Goal: Free from fall injury  Outcome: Progressing     Problem: Discharge Planning  Goal: Discharge to home or other facility with appropriate resources  Outcome: Progressing     Problem: Skin/Tissue Integrity  Goal: Absence of new skin breakdown  Description: 1.  Monitor for areas of redness and/or skin breakdown  2.  Assess vascular access sites hourly  3.  Every 4-6 hours minimum:  Change oxygen saturation probe site  4.  Every 4-6 hours:  If on nasal continuous positive airway pressure, respiratory therapy assess nares and determine need for appliance change or resting period.  Outcome: Progressing     Problem: Nutrition Deficit:  Goal: Optimize nutritional status  Outcome: Progressing

## 2024-07-11 NOTE — PROGRESS NOTES
[facility]   Daily Cardiovascular Progress Note    Admit Date: 7/9/2024    Chief complaint: sob  HPI:     Pt denies CP, SOB, dizziness or syncope.  Denies any groin or abdominal pain or back pain or flank pain.      Medications/Labs all Reviewed:  Patient Active Problem List   Diagnosis    Sepsis without acute organ dysfunction (HCC)    CHF (congestive heart failure) ([facility])    Chronic obstructive pulmonary disease (HCC)    Coronary artery disease involving native coronary artery of native heart    PVD (peripheral vascular disease) ([facility])    Bicuspid aortic valve    Bilateral hilar adenopathy syndrome    Claudication in peripheral vascular disease ([facility])    Hypertension    Diabetic neuropathy ([facility])    Type 2 DM with hypertension and ESRD on dialysis ([facility])    Passive smoke exposure    Depression with anxiety    Community acquired pneumonia of left lower lobe of lung    Obesity    ZAINAB on CPAP    Degeneration of lumbar or lumbosacral intervertebral disc    Lumbar radiculopathy    Lumbosacral spondylosis without myelopathy    Biliary colic    Symptomatic cholelithiasis    Gastroparesis due to DM ([facility])    Elevated troponin    Angina, class IV ([facility])    Dyspnea    Dyslipidemia    Chronic systolic CHF (congestive heart failure) ([facility])    Ischemic cardiomyopathy    Tobacco abuse    CVA (cerebral vascular accident) ([facility])    History of CVA (cerebrovascular accident)    Type 2 diabetes mellitus without complication, without long-term current use of insulin (HCC)    ZAINAB treated with BiPAP    Pleural effusion    Chronic anemia    Nonrheumatic aortic valve stenosis    Mucus plugging of bronchi    Hemodialysis-associated hypotension    ESRD on dialysis ([facility])    Hypotension due to drugs    Acute diastolic CHF (congestive heart failure) ([facility])    Neuromuscular disorder ([facility])    Renovascular hypertension    Mixed hyperlipidemia    Cigarette nicotine dependence in remission    Pulmonary edema    Fluid overload    Anemia of  respiratory failure with hypoxemia (HCC)    Hyperkalemia    Hyponatremia    Metabolic acidemia    Pulmonary infiltrate    Leukocytosis    Hypertensive urgency    Severe sepsis (HCC)    Positive blood culture    Chest heaviness    Abnormal cardiovascular stress test    Ulcer with gangrene, with unspecified severity (HCC)    Symptomatic bradycardia    Pulmonary HTN (HCC)    Aortic valve disease    Cardiogenic shock (HCC)    Gangrene of right foot (HCC)    Wound infection    ESRD (end stage renal disease) (HCC)    Acute respiratory failure with hypoxia (HCC)    Volume overload    Sepsis (HCC)    Bimalleolar fracture of right ankle, closed, initial encounter    High anion gap metabolic acidosis    Acute on chronic left systolic heart failure (HCC)    Anxiety    Acute on chronic respiratory failure (HCC)    Non-compliance    Diabetic ketoacidosis without coma associated with diabetes mellitus due to underlying condition ([facility])    Abnormal nuclear cardiac imaging test    ZAINAB (obstructive sleep apnea)    Acute systolic CHF (congestive heart failure) ([facility])    Foot ulcer with necrosis of bone, left (HCC)    Right foot ulcer, with necrosis of bone (HCC)    Foot infection    Chest pain    Dark emesis    Coronary artery disease, unspecified vessel or lesion type, unspecified whether angina present, unspecified whether native or transplanted heart    Abnormal stress test       Medications:  heparin (porcine) injection 3,600 Units, PRN  epoetin siddharth-epbx (RETACRIT) injection 10,000 Units, Once per day on Tue Thu Sat  vancomycin 1500 mg in sodium chloride 0.9% 300 mL IVPB, Once  oxyCODONE (ROXICODONE) immediate release tablet 10 mg, Q4H PRN  busPIRone (BUSPAR) tablet 10 mg, TID  calcium acetate (PHOSLO) capsule 1,334 mg, TID  DULoxetine (CYMBALTA) extended release capsule 60 mg, Daily  insulin glargine (LANTUS) injection vial 15 Units, Nightly  ipratropium 0.5 mg-albuterol 2.5 mg (DUONEB) nebulizer solution 1 Dose, Q4H  study and technically difficult study due to patient's body habitus.      Cath:      CARDIAC CATHETERIZATION REPORT      Procedure Date:  7/10/2024  Patient Name: Igor Ann  MRN: [id number]      : 1970           INDICATION      Ischemic cardiomyopathy  Heart failure  Non-STEMI  Abnormal stress test     PROCEDURES PERFORMED      Right heart catheterization  Left heart catheterization  LVgram  Coronary angiogam  Coronary cath  LIMA angiogram           PROCEDURE DESCRIPTION   Immediately before procedure, sedation assessment was performed again and prior findings as per sedation note (see that note for details) are unchanged. Risks/benefits/alternatives/outcomes were discussed with patient and/or family and informed consent was obtained.  Patient was prepped draped in the usual sterile fashion.  Local anaesthetic was applied over puncture sites.  Using fluoroscopic and ultrasound guidance with a micropuncture kit, 5 Vincentian sheath was inserted to the right common femoral vein and 5/6 Terumo slender sheath was inserted into the right common femoral artery.  5 Vincentian PA catheter was used with [place]-Seema wire to perform right heart catheterization this was then removed.  At the end of the procedure the venous sheath was removed and manual pressure was applied until hemostasis was obtained.  5 Vincentian pigtail, JL 4, 3 DRC, AR-1 catheters were utilized for coronary angiography, pigtail was used for left heart catheterization and LV gram as well as pullback, JL 4 was utilized for left-sided cor angiography and 3 DRC was taken but only partial RCA angiography could be obtained with this and better angiography was obtained with AR 1 catheter.  Coronary cannulation was difficult due to prior TAVR.  Sedation was provided by the anesthesiology service, see their notes for full details there were no immediate complications.  EBL <20cc.   Pt declined to have me call family/friends to provide updates on findings/plans.

## 2024-07-11 NOTE — PLAN OF CARE
CHF Care Plan      Patient's EF (Ejection Fraction) is greater than 40%    Heart Failure Medications:  Diuretics:: None    (One of the following REQUIRED for EF </= 40%/SYSTOLIC FAILURE but MAY be used in EF% >40%/DIASTOLIC FAILURE)        ACE:: None        ARB:: None         ARNI:: Sacubitril/Valsartan-Entresto    (Beta Blockers)  NON- Evidenced Based Beta Blocker (for EF% >40%/DIASTOLIC FAILURE): None    Evidenced Based Beta Blocker::(REQUIRED for EF% <40%/SYSTOLIC FAILURE) Metoprolol SUCCinate- Toprol XL  ...................................................................................................................................................    Failed to redirect to the Timeline version of the [employer] Smart[employer].      Patient's weights and intake/output reviewed    Daily Weight log at bedside, patient/family participation in use of log: \"yes    Patient's current weight today shows a difference of 3 lbs more than last documented weight.      Intake/Output Summary (Last 24 hours) at 7/11/2024 1209  Last data filed at 7/11/2024 0425  Gross per 24 hour   Intake 1062 ml   Output 5 ml   Net 1057 ml       Education Booklet Provided: yes    Comorbidities Reviewed Yes    Patient has a past medical history of Ambulatory dysfunction, Aortic stenosis, Arthritis, Asthma, Bilateral hilar adenopathy syndrome, CAD (coronary artery disease), Cardiomyopathy (HCC), CHF (congestive heart failure) ([facility]), Chronic pain, COPD (chronic obstructive pulmonary disease) (HCC), CVA (cerebral vascular accident) ([facility]), Depression, Diabetes mellitus (HCC), Difficult intravenous access, Emphysema of lung (HCC), ESRD (end stage renal disease) on dialysis ([facility]), Fear of needles, Gastric ulcer, GERD (gastroesophageal reflux disease), Heart valve problem, Hemodialysis patient (HCC), History of spinal fracture, History of transcatheter aortic valve replacement (TAVR), Hx of blood clots, Hyperlipidemia, Hypertension, MI (myocardial infarction)

## 2024-07-11 NOTE — PLAN OF CARE
CHF Care Plan      Patient's EF (Ejection Fraction) is greater than 40%    Heart Failure Medications:  Diuretics:: None    (One of the following REQUIRED for EF </= 40%/SYSTOLIC FAILURE but MAY be used in EF% >40%/DIASTOLIC FAILURE)        ACE:: None        ARB:: None         ARNI:: Sacubitril/Valsartan-Entresto    (Beta Blockers)  NON- Evidenced Based Beta Blocker (for EF% >40%/DIASTOLIC FAILURE): None    Evidenced Based Beta Blocker::(REQUIRED for EF% <40%/SYSTOLIC FAILURE) Metoprolol SUCCinate- Toprol XL  ...................................................................................................................................................    Failed to redirect to the Timeline version of the [employer] SmartLink.      Patient's weights and intake/output reviewed    Daily Weight log at bedside, patient/family participation in use of log: \"yes    Patient's current weight today shows a difference of 3  lbs more than last documented weight.      Intake/Output Summary (Last 24 hours) at 7/11/2024 0734  Last data filed at 7/11/2024 0425  Gross per 24 hour   Intake 1062 ml   Output 5 ml   Net 1057 ml       Education Booklet Provided: yes    Comorbidities Reviewed Yes    Patient has a past medical history of Ambulatory dysfunction, Aortic stenosis, Arthritis, Asthma, Bilateral hilar adenopathy syndrome, CAD (coronary artery disease), Cardiomyopathy (HCC), CHF (congestive heart failure) ([facility]), Chronic pain, COPD (chronic obstructive pulmonary disease) (HCC), CVA (cerebral vascular accident) ([facility]), Depression, Diabetes mellitus (HCC), Difficult intravenous access, Emphysema of lung ([facility]), ESRD (end stage renal disease) on dialysis ([facility]), Fear of needles, Gastric ulcer, GERD (gastroesophageal reflux disease), Heart valve problem, Hemodialysis patient (HCC), History of spinal fracture, History of transcatheter aortic valve replacement (TAVR), Hx of blood clots, Hyperlipidemia, Hypertension, MI (myocardial infarction)  (HCC), Neuromuscular disorder (HCC), Numbness and tingling in left arm, Pneumonia, PONV (postoperative nausea and vomiting), Prolonged emergence from general anesthesia, Sleep apnea, Stroke (HCC), TIA (transient ischemic attack), and Unspecified diseases of blood and blood-forming organs.     >>For CHF and Comorbidity documentation on Education Time and Topics, please see Education Tab      Pt resting in bed at this time on BiPAP. Pt denies shortness of breath. Pt without lower extremity edema.     Patient and/or Family's stated Goal of Care this Admission: reduce shortness of breath, increase activity tolerance, better understand heart failure and disease management, be more comfortable, and reduce lower extremity edema prior to discharge        :

## 2024-07-11 NOTE — FLOWSHEET NOTE
07/11/24 0806 07/11/24 1141   Vital Signs   BP (!) 169/86 (!) 114/50   Temp 97.7 °F (36.5 °C) 97.5 °F (36.4 °C)   Pulse 74 74   Respirations 20 16       Treatment time: 3.5  hours  Net UF: 3 L    Pre weight: 96.5 kg (standing scale)  Post weight: 93.5 kg (standing scale)  EDW: 93 kg     Access used: LIJ HD tunneled cath  Access function: High AP, ran at Qb 350    Medications or blood products given: Vancomycin 1.5 Gm IVPB, Retacrit 46803 units IVP    Regular outpatient schedule: [facility]    Summary of response to treatment: Tolerated 3.5 hour HD tx without difficulty. Vital signs stable throughout tx.  Unable to perform Crit line data, machine would not verify.    Copy of dialysis treatment record placed in chart, to be scanned into EMR.    Report called to

## 2024-07-11 NOTE — PROGRESS NOTES
tech contacted Vonnie GAN RN re: patient availability for carotid and vein mapping dopplers. RN asked to return call in a few mins.   Vonnie contacted VL back (spoke with CHANDRIKA VenegasT)  and stated wound care is currently in treating patient and then he will need to travel to [place]. VL will attempt studies in the AM when patient is available.

## 2024-07-11 NOTE — PROGRESS NOTES
V2.0    [facility] [place] Progress Note      Name:  Igor Ann /Age/Sex: 1970  (54 y.o. male)   MRN & CSN:  [id number] & [id number] Encounter Date/Time: 2024 9:14 AM EDT   Location:  044 PCP: Vonnie Cleveland APRN - NP     Attending:Naveed Munson MD       Hospital Day: 3    Assessment and Recommendations   Igor Ann is a 54 y.o. male with pmh of COPD, CAD status post 3 stents, ESRD on dialysis, GERD, heart failure, type 2 diabetes, and ZAINAB who presents with Coronary artery disease, unspecified vessel or lesion type, unspecified whether angina present, unspecified whether native or transplanted heart      Plan:   Chest pain/CAD status post 3 stents   -Ramus, left circumflex, right coronary artery stent placed in   -Troponins flat trajectory, EKG with T wave inversions new compared to previous  -Cardiology consulted  -Cath cath today showed restenosis of the ramus, left circumflex, right coronary artery  -Patient received 3 additional stents  in the subsequent arteries listed above  -Continue aspirin 81  -Continue pravastatin 40 mg  -Holding Plavix by CT surgery anticipation for CABG  -Eliquis currently on hold  -CT surgery consulted and prepping for CABG    ESRD on dialysis  -Hemodialysis on , last session   -Nephrology has been consulted    Heart failure reduced EF  -Echo 7 5 showed 45 to 50%  -Continue Entresto  -Continue metoprolol  -CHF order set in place    Type 2 diabetes  Continue 15 units nightly  Low-dose sliding scale    GERD  -Possible hematemesis  -GI consulted  -EGD negative  -Will continue pantoprazole 40 mg twice daily    Chronic normocytic anemia  Continue monitor CBC  No signs of bleeding, holding Eliquis  Secondary to CKD  Patient gets Retacrit    Chronic osteomyelitis  Status post foot I&D with MT bone resection  Patient finished antibiotics and was following with podiatry  Wound care consulted    History of DVT/paroxysmal  effusions with bibasilar airspace disease.     Moderate left and small right pleural effusions with bibasilar airspace disease.  This could represent a combination of pneumonia and atelectasis.       CBC:   Recent Labs     07/09/24  2355 07/10/24  0600 07/11/24  0806   WBC 6.7 6.7 7.2   HGB 10.0* 9.5* 8.5*    286 273       BMP:    Recent Labs     07/09/24  2355 07/10/24  0600 07/11/24  0806   * 132* 129*   K 4.9 5.3* 6.1*   CL 93* 96* 93*   CO2 24 25 23   BUN 52* 56* 79*   CREATININE 5.3* 5.7* 7.1*   GLUCOSE 144* 99 166*       Hepatic:   Recent Labs     07/09/24 2355   AST 12*   ALT 13   BILITOT <0.2   ALKPHOS 183*       Lipids:   Lab Results   Component Value Date/Time    CHOL 134 03/28/2024 11:40 AM    HDL 49 03/28/2024 11:40 AM    TRIG 95 03/28/2024 11:40 AM     Hemoglobin A1C:   Lab Results   Component Value Date/Time    LABA1C 6.0 07/04/2024 04:38 AM     TSH:   Lab Results   Component Value Date/Time    TSH 2.15 03/17/2022 08:33 PM    TSH 2.02 02/25/2022 06:24 AM     Troponin: No results found for: \"TROPONINT\"  Lactic Acid: No results for input(s): \"LACTA\" in the last 72 hours.  BNP: No results for input(s): \"PROBNP\" in the last 72 hours.  UA:  Lab Results   Component Value Date/Time    NITRU Negative 05/23/2024 08:00 PM    COLORU Yellow 05/23/2024 08:00 PM    PHUR 8.0 05/23/2024 08:00 PM    PHUR 8.0 05/01/2023 11:28 AM    LABCAST 3-5 Hyaline 03/10/2020 01:58 PM    WBCUA 10-20 05/23/2024 08:00 PM    RBCUA 0-2 05/23/2024 08:00 PM    MUCUS 1+ 05/23/2024 08:00 PM    BACTERIA 2+ 05/23/2024 08:00 PM    CLARITYU Clear 05/23/2024 08:00 PM    SPECGRAV 1.015 06/14/2012 11:33 AM    LEUKOCYTESUR TRACE 05/23/2024 08:00 PM    UROBILINOGEN 0.2 05/23/2024 08:00 PM    BILIRUBINUR Negative 05/23/2024 08:00 PM    BLOODU TRACE-INTACT 05/23/2024 08:00 PM    GLUCOSEU 500 05/23/2024 08:00 PM    KETUA Negative 05/23/2024 08:00 PM    AMORPHOUS 3+ 03/10/2020 01:58 PM     Urine Cultures:   Lab Results   Component Value  Date/Time    LABURIN  05/23/2024 08:00 PM     <50,000 CFU/ml mixed skin/urogenital mauri. No further workup     Blood Cultures:   Lab Results   Component Value Date/Time    BC No Growth after 4 days of incubation. 05/02/2024 04:55 PM     Lab Results   Component Value Date/Time    BLOODCULT2 No Growth after 4 days of incubation. 05/02/2024 05:16 PM     Organism:   Lab Results   Component Value Date/Time    ORG Staphylococcus epidermidis 05/02/2024 01:23 PM         Electronically signed by Lelo Trinh DO on 7/11/2024 at 12:45 PM

## 2024-07-11 NOTE — PROGRESS NOTES
Department of Cardiovascular & Thoracic Surgery  Consult        DIAGNOSIS: Severe CAD    CHIEF COMPLAINT: Chest pain  History Obtained From: electronic medical record, reason patient could not give history: given general anesthesia and Precedex    HISTORY OF PRESENT ILLNESS:    The patient is a 54 y.o. male with significant past medical history of CVA (2017), ZAINAB on CPAP, multiple MI (most recently 2019), CAD s/p multiple stents (most recently May 2015), ESRD on HD T/T/S, DM II, COPD, CHF, GERD, PVD s/p bilateral iliac stenting on Plavix, TAVR (10/2019 with Dr. Holland), & prior VTE on Eliquis who presented as a transfer from [facility]. Patient initially presented to [facility] on 7/3 with chest pain that radiated into the left arm that improved upon administration of nitro. His BNP was >70k and troponins were 113-112-111. EKG at that time showed question of inferior ischemia as well as some T wave inversion. Patient was ultimately admitted for a chest pain workup. ECHO obtained 7/5 showed EF 45-50% with moderate hypokinesis in the basal anterior admitted anterior segments. Patient also had an abnormal stress test, which showed an EF of 37%. He was transferred to [facility] in order to undergo LHC with Dr. Rio ledezma, which showed multivessel CAD. The cardiothoracic surgery team was consulted for consideration of CABG.    Upon meeting patient, he was groggy in the PACU as he had received general anesthesia and Precedex. He kept falling asleep and thus could not answer how he was feeling at this time.    Past Medical History:        Diagnosis Date    Ambulatory dysfunction     walker for long distances, SOB with distance    Aortic stenosis     echo 2017    Arthritis     hands and hips    Asthma     Bilateral hilar adenopathy syndrome 06/03/2013    CAD (coronary artery disease)     Dr. Javier [facility]    Cardiomyopathy (HCC) 04/19/2019    EF= 43%    CHF (congestive heart failure) ([facility])     Chronic  AM    LYMPHOPCT 11.0 07/09/2024 11:55 PM    MONOPCT 8.3 07/09/2024 11:55 PM    MYELOPCT 1 04/07/2024 05:12 AM    EOSPCT 4.6 07/09/2024 11:55 PM    BASOPCT 0.9 07/09/2024 11:55 PM    MONOSABS 0.6 07/09/2024 11:55 PM    LYMPHSABS 0.7 07/09/2024 11:55 PM    EOSABS 0.3 07/09/2024 11:55 PM    BASOSABS 0.1 07/09/2024 11:55 PM    DIFFTYPE Auto 05/17/2013 06:50 AM     BMP:    Lab Results   Component Value Date/Time     07/11/2024 08:06 AM    K 6.1 07/11/2024 08:06 AM    K 5.5 07/09/2024 04:35 AM    CL 93 07/11/2024 08:06 AM    CO2 23 07/11/2024 08:06 AM    BUN 79 07/11/2024 08:06 AM    CREATININE 7.1 07/11/2024 08:06 AM    CALCIUM 8.8 07/11/2024 08:06 AM    GFRAA 10 10/05/2022 11:53 AM    GFRAA >60 05/17/2013 06:50 AM    LABGLOM 8 07/11/2024 08:06 AM    LABGLOM 9 04/10/2024 09:25 AM    LABGLOM 12 01/25/2024 12:00 AM    GLUCOSE 166 07/11/2024 08:06 AM     Troponin:    Lab Results   Component Value Date/Time    TROPONINI 0.11 04/18/2023 01:27 AM     7/10 EKG: Normal sinus rhythm Nonspecific T wave abnormality Abnormal ECG When compared with ECG of 07-JUL-2024 10:57, Nonspecific T wave abnormality has replaced inverted T waves in Inferior leads frontal plane axis has shifted left Confirmed by ZOË FOSTER MD ([id number]) on 7/10/2024 4:04:18 PM     7/3 CXR: IMPRESSION:  Moderate left and small right pleural effusions with bibasilar airspace  disease.  This could represent a combination of pneumonia and atelectasis.    4/1 ECHO: Summary   Technically difficult examination.   Patient did not want to continue with exam, definity not used.      Left ventricular systolic function is reduced with a visually estimated   ejection fraction of 35-45%.   The left ventricle is normal in size with mild concentric hypertrophy.   There is hypokinesis of the inferior wall. Cannot exclude further regional   wall motion abnormalities secondary to poor endocardial visualization.   indeterminate diastolic dysfunction.   The right    GSV Below Knee Mid 1.1 mm         GSV Below Knee Dist 1.3 mm           Carotid Duplex 7/11:  Interpretation Summary         Mild (<50%) stenosis in the right internal carotid artery.    Mild (<50%) stenosis in the left internal carotid artery.    Normal antegrade flow involving the right vertebral artery.    Normal antegrade flow involving the left vertebral artery.     Cerebrovascular Findings    Right Carotid    Internal Carotid Artery: Mild (<50%) stenosis.   Vertebral Artery: Flow is antegrade.   Subclavian Artery: With multiphasic waveforms.   Left Carotid    Internal Carotid Artery: Mild (<50%) stenosis. Minimal plaque.    Vertebral Artery: Flow is antegrade.   Subclavian artery not visualized due to bandages.     Carotid Measurements     Right PSV Right EDV Left PSV Left EDV   CCA Prox 72.7 cm/s       10.6 cm/s       86.2 cm/s       10.2 cm/s         CCA Mid 54.5 cm/s       11 cm/s       68 cm/s       15.4 cm/s         CCA Dist 53.3 cm/s       11 cm/s       49.2 cm/s       9.7 cm/s         ICA Prox 60.8 cm/s       17.2 cm/s       50 cm/s       12.3 cm/s         ICA Mid 67.5 cm/s       19.8 cm/s       56.2 cm/s       17.1 cm/s         ICA Dist 76.3 cm/s       20.3 cm/s       83.9 cm/s       23 cm/s         ICA/CCA Ratio 1.41        1.23          ECA 53.7 cm/s       8.62 cm/s       64.5 cm/s       7.9 cm/s         Vertebral 49.1 cm/s       11.3 cm/s       29.8 cm/s       9.02 cm/s           CHEST CT:  Ordered and result pending.      CORONARY ARTERIES  LM Less than 10% [id number] stenosis         LAD Heavily calcified, there are stents within the LAD and diagonal artery, proximally there is 50% stenosis and in the mid-- distal segment there is a discrete area of 70% eccentric stenosis.     D1 is a medium sized vessel, it is the largest diagonal branch, it is stented (this was described as a ramus previously although it is a high diag), there is eccentric 50 to 75% stenosis in the proximal-mid segment

## 2024-07-11 NOTE — PROGRESS NOTES
Pt not wanting to go on bipap yet. Instructed pt to call nurse when ready and RT can put him on bipap.

## 2024-07-12 ENCOUNTER — APPOINTMENT (OUTPATIENT)
Dept: GENERAL RADIOLOGY | Age: 54
DRG: 286 | End: 2024-07-12
Attending: INTERNAL MEDICINE
Payer: MEDICARE

## 2024-07-12 PROBLEM — I25.10 CAD (CORONARY ARTERY DISEASE): Status: ACTIVE | Noted: 2024-07-12

## 2024-07-12 LAB
ANION GAP SERPL CALCULATED.3IONS-SCNC: 15 MMOL/L (ref 3–16)
BUN SERPL-MCNC: 56 MG/DL (ref 7–20)
CALCIUM SERPL-MCNC: 8.9 MG/DL (ref 8.3–10.6)
CHLORIDE SERPL-SCNC: 97 MMOL/L (ref 99–110)
CO2 SERPL-SCNC: 24 MMOL/L (ref 21–32)
CREAT SERPL-MCNC: 5.6 MG/DL (ref 0.9–1.3)
DEPRECATED RDW RBC AUTO: 17.5 % (ref 12.4–15.4)
GFR SERPLBLD CREATININE-BSD FMLA CKD-EPI: 11 ML/MIN/{1.73_M2}
GLUCOSE BLD-MCNC: 124 MG/DL (ref 70–99)
GLUCOSE BLD-MCNC: 153 MG/DL (ref 70–99)
GLUCOSE BLD-MCNC: 155 MG/DL (ref 70–99)
GLUCOSE BLD-MCNC: 165 MG/DL (ref 70–99)
GLUCOSE SERPL-MCNC: 153 MG/DL (ref 70–99)
HCT VFR BLD AUTO: 27.6 % (ref 40.5–52.5)
HGB BLD-MCNC: 8.7 G/DL (ref 13.5–17.5)
MAGNESIUM SERPL-MCNC: 1.9 MG/DL (ref 1.8–2.4)
MCH RBC QN AUTO: 26.8 PG (ref 26–34)
MCHC RBC AUTO-ENTMCNC: 31.7 G/DL (ref 31–36)
MCV RBC AUTO: 84.6 FL (ref 80–100)
PERFORMED ON: ABNORMAL
PLATELET # BLD AUTO: 271 K/UL (ref 135–450)
PMV BLD AUTO: 7.6 FL (ref 5–10.5)
POTASSIUM SERPL-SCNC: 5 MMOL/L (ref 3.5–5.1)
RBC # BLD AUTO: 3.27 M/UL (ref 4.2–5.9)
SODIUM SERPL-SCNC: 136 MMOL/L (ref 136–145)
WBC # BLD AUTO: 6.4 K/UL (ref 4–11)

## 2024-07-12 PROCEDURE — 83735 ASSAY OF MAGNESIUM: CPT

## 2024-07-12 PROCEDURE — 94761 N-INVAS EAR/PLS OXIMETRY MLT: CPT

## 2024-07-12 PROCEDURE — 2700000000 HC OXYGEN THERAPY PER DAY

## 2024-07-12 PROCEDURE — 80048 BASIC METABOLIC PNL TOTAL CA: CPT

## 2024-07-12 PROCEDURE — 99232 SBSQ HOSP IP/OBS MODERATE 35: CPT | Performed by: INTERNAL MEDICINE

## 2024-07-12 PROCEDURE — 2580000003 HC RX 258

## 2024-07-12 PROCEDURE — 6370000000 HC RX 637 (ALT 250 FOR IP): Performed by: NURSE PRACTITIONER

## 2024-07-12 PROCEDURE — 94010 BREATHING CAPACITY TEST: CPT

## 2024-07-12 PROCEDURE — 71045 X-RAY EXAM CHEST 1 VIEW: CPT

## 2024-07-12 PROCEDURE — 85027 COMPLETE CBC AUTOMATED: CPT

## 2024-07-12 PROCEDURE — 2500000003 HC RX 250 WO HCPCS: Performed by: NURSE PRACTITIONER

## 2024-07-12 PROCEDURE — 6360000002 HC RX W HCPCS: Performed by: STUDENT IN AN ORGANIZED HEALTH CARE EDUCATION/TRAINING PROGRAM

## 2024-07-12 PROCEDURE — 94660 CPAP INITIATION&MGMT: CPT

## 2024-07-12 PROCEDURE — 2060000000 HC ICU INTERMEDIATE R&B

## 2024-07-12 PROCEDURE — 2580000003 HC RX 258: Performed by: INTERNAL MEDICINE

## 2024-07-12 PROCEDURE — 6370000000 HC RX 637 (ALT 250 FOR IP)

## 2024-07-12 PROCEDURE — 36415 COLL VENOUS BLD VENIPUNCTURE: CPT

## 2024-07-12 RX ORDER — SODIUM CHLORIDE 0.9 % (FLUSH) 0.9 %
5-40 SYRINGE (ML) INJECTION PRN
Status: DISCONTINUED | OUTPATIENT
Start: 2024-07-12 | End: 2024-07-19 | Stop reason: HOSPADM

## 2024-07-12 RX ORDER — SODIUM CHLORIDE 9 MG/ML
INJECTION, SOLUTION INTRAVENOUS PRN
Status: DISCONTINUED | OUTPATIENT
Start: 2024-07-12 | End: 2024-07-19 | Stop reason: HOSPADM

## 2024-07-12 RX ORDER — SODIUM CHLORIDE 0.9 % (FLUSH) 0.9 %
5-40 SYRINGE (ML) INJECTION EVERY 12 HOURS SCHEDULED
Status: DISCONTINUED | OUTPATIENT
Start: 2024-07-12 | End: 2024-07-19 | Stop reason: HOSPADM

## 2024-07-12 RX ORDER — DOCUSATE SODIUM 100 MG/1
100 CAPSULE, LIQUID FILLED ORAL DAILY
Status: DISCONTINUED | OUTPATIENT
Start: 2024-07-12 | End: 2024-07-19 | Stop reason: HOSPADM

## 2024-07-12 RX ADMIN — SACUBITRIL AND VALSARTAN 1 TABLET: 24; 26 TABLET, FILM COATED ORAL at 15:12

## 2024-07-12 RX ADMIN — VENLAFAXINE HYDROCHLORIDE 75 MG: 37.5 CAPSULE, EXTENDED RELEASE ORAL at 15:12

## 2024-07-12 RX ADMIN — CALCIUM ACETATE 1334 MG: 667 CAPSULE ORAL at 15:12

## 2024-07-12 RX ADMIN — DULOXETINE HYDROCHLORIDE 60 MG: 60 CAPSULE, DELAYED RELEASE ORAL at 15:12

## 2024-07-12 RX ADMIN — CALCIUM ACETATE 1334 MG: 667 CAPSULE ORAL at 22:37

## 2024-07-12 RX ADMIN — QUETIAPINE FUMARATE 50 MG: 25 TABLET ORAL at 22:38

## 2024-07-12 RX ADMIN — SODIUM CHLORIDE, PRESERVATIVE FREE 10 ML: 5 INJECTION INTRAVENOUS at 22:46

## 2024-07-12 RX ADMIN — ASPIRIN 81 MG 81 MG: 81 TABLET ORAL at 15:12

## 2024-07-12 RX ADMIN — HEPARIN SODIUM 5000 UNITS: 5000 INJECTION INTRAVENOUS; SUBCUTANEOUS at 15:07

## 2024-07-12 RX ADMIN — PRAVASTATIN SODIUM 40 MG: 40 TABLET ORAL at 15:12

## 2024-07-12 RX ADMIN — PANTOPRAZOLE SODIUM 40 MG: 40 TABLET, DELAYED RELEASE ORAL at 15:07

## 2024-07-12 RX ADMIN — BUSPIRONE HYDROCHLORIDE 10 MG: 5 TABLET ORAL at 22:38

## 2024-07-12 RX ADMIN — HEPARIN SODIUM 5000 UNITS: 5000 INJECTION INTRAVENOUS; SUBCUTANEOUS at 22:37

## 2024-07-12 RX ADMIN — METOPROLOL SUCCINATE 25 MG: 25 TABLET, EXTENDED RELEASE ORAL at 15:12

## 2024-07-12 RX ADMIN — OXYCODONE 10 MG: 5 TABLET ORAL at 22:35

## 2024-07-12 RX ADMIN — INSULIN GLARGINE 15 UNITS: 100 INJECTION, SOLUTION SUBCUTANEOUS at 22:12

## 2024-07-12 RX ADMIN — SACUBITRIL AND VALSARTAN 1 TABLET: 24; 26 TABLET, FILM COATED ORAL at 22:37

## 2024-07-12 RX ADMIN — DOCUSATE SODIUM 100 MG: 100 CAPSULE, LIQUID FILLED ORAL at 15:58

## 2024-07-12 RX ADMIN — TRAZODONE HYDROCHLORIDE 50 MG: 50 TABLET ORAL at 22:38

## 2024-07-12 RX ADMIN — SODIUM CHLORIDE, PRESERVATIVE FREE 10 ML: 5 INJECTION INTRAVENOUS at 22:52

## 2024-07-12 RX ADMIN — SODIUM CHLORIDE, PRESERVATIVE FREE 10 ML: 5 INJECTION INTRAVENOUS at 10:37

## 2024-07-12 RX ADMIN — BUSPIRONE HYDROCHLORIDE 10 MG: 5 TABLET ORAL at 15:12

## 2024-07-12 ASSESSMENT — PAIN - FUNCTIONAL ASSESSMENT: PAIN_FUNCTIONAL_ASSESSMENT: ACTIVITIES ARE NOT PREVENTED

## 2024-07-12 ASSESSMENT — PAIN SCALES - GENERAL
PAINLEVEL_OUTOF10: 0
PAINLEVEL_OUTOF10: 0
PAINLEVEL_OUTOF10: 9

## 2024-07-12 ASSESSMENT — PAIN DESCRIPTION - LOCATION: LOCATION: OTHER (COMMENT)

## 2024-07-12 ASSESSMENT — PAIN DESCRIPTION - DESCRIPTORS: DESCRIPTORS: SHARP

## 2024-07-12 NOTE — PROGRESS NOTES
Bedside spirometry complete. Patient had good effort, results will be placed in patients physical copy.

## 2024-07-12 NOTE — PROGRESS NOTES
Physician Progress Note      PATIENT:               ALLEN HARDWICK  CSN #:                  [id number]  :                       1970  ADMIT DATE:       7/3/2024 3:51 PM  DISCH DATE:        2024 9:55 PM  RESPONDING  PROVIDER #:        Laura Jenkins DO          QUERY TEXT:    Internal Medicine,    Pt admitted with  chest pain. Cardiology documents NSTEMI, no cause for the   chest pain is documented by the attending. If possible, please document in   progress notes and discharge summary if you are evaluating and/or treating any   of the following as the cause of the chest pain:    The medical record reflects the following:  Risk Factors:  CAD  Clinical Indicators: no cause for the chest pain is documented by Internal   Med-Cardiology documents NSTEMI, pleural effusions removed with dialysis, EGD   showed gastritis, duodenitis and esophagitis  Treatment: labs, imaging, Cardiology consult, medical management    Thank you,  Lamar Washington RN [facility]  [phone]  Options provided:  -- Chest pain due to CAD with unstable angina  -- Chest pain due to NSTEMI  -- Chest pain due to GERD  -- Chest pain due to gastritis  -- Chest pain due to esophagitis  -- Chest pain due to pleural effusions related to, please document cause of   pleural effusions  -- Chest pain due to, please document cause of chest pain  -- Other - I will add my own diagnosis  -- Disagree - Not applicable / Not valid  -- Disagree - Clinically unable to determine / Unknown  -- Refer to Clinical Documentation Reviewer    PROVIDER RESPONSE TEXT:    This patient has an NSTEMI.    Query created by: Lamar Washington on 2024 3:59 PM      Electronically signed by:  Laura Jenkins DO 2024 7:36 AM

## 2024-07-12 NOTE — PROGRESS NOTES
07/12/24 0240   NIV Type   NIV Started/Stopped On   Equipment Type v60   Mode Bilevel   Mask Type Full face mask   Mask Size Medium   Assessment   Comfort Level Good   Using Accessory Muscles No   Mask Compliance Good   Skin Assessment Clean, dry, & intact   Skin Protection for O2 Device   (patient refused)   Settings/Measurements   PIP Observed 12 cm H20   IPAP 12 cmH20   CPAP/EPAP 6 cmH2O   Vt (Measured) 337 mL   Rate Ordered 12   FiO2  40 %   I Time/ I Time % 1 s   Minute Volume (L/min) 6.3 Liters   Mask Leak (lpm) 54 lpm   Patient's Home Machine No   Alarm Settings   Alarms On Y

## 2024-07-12 NOTE — PROGRESS NOTES
CARDIOLOGY PROGRESS NOTE      Patient Name: Igor Ann  Date of admission: 7/9/2024 10:59 PM  Admission Dx: Coronary artery disease, unspecified vessel or lesion type, unspecified whether angina present, unspecified whether native or transplanted heart [I25.10]  Reason for Consult:  chest pain, abnormal stress test, elevated troponin  Requesting Physician: Naveed Munson MD  Primary Care physician: Vonnie Cleveland, APRN - NP    Subjective:     Igor Ann is a 54 y.o. man with severe CAD s/p PCI with multiple stents, HFrEF, PVD s/p bilateral iliac stents, ESRD, COPD, HTN, HLP, DM2, and morbid obesity admitted for SOB and chest pain.      Admitted 7/4 [facility] with cc of chest pain with radiation to left arm. Improved with NTG. Recent left heart catheterization 5/2023 with PATRICIA to RI, Lcx and RCA 2/2 severe ISR. EKG NSR with T wave ivnersions inferolateral leads. HS trop >100 with flat trend. Stress test this admission abnormal suggesting anterior and apical ischemia as well as mixed ischemia/scar inferior wall. EF 37%. High Risk. Echo showed LVEF 45-50%.  Patient was transferred to [facility] where he underwent angiogram with Dr. Pate 7/10 and was found to have multivessel disease.  He is waiting plans for 3-4 vessel bypass next Wednesday.  Possible Impella placement.    Patient is evaluated today, doing well.  No active angina.  No shortness of breath.  Stable on 4 L home oxygen therapy.  He feels cold-his only complaint.  No palpitations, dizziness, orthopnea or edema.    Home Medications:  Were reviewed and are listed in nursing record and/or below  Prior to Admission medications    Medication Sig Start Date End Date Taking? Authorizing Provider   LORazepam (ATIVAN) 0.5 MG tablet Take 1 tablet by mouth every 6 hours as needed.    ProviderGerson MD   metoprolol tartrate (LOPRESSOR) 25 MG tablet Take 1 tablet by mouth 2 times daily 5/1/24   Provider, Historical, MD   midodrine  ARTERIES     LM Less than 10% ofrxonla-ala-julkik stenosis         LAD Heavily calcified, there are stents within the LAD and diagonal artery, proximally there is 50% stenosis and in the mid-- distal segment there is a discrete area of 70% eccentric stenosis.     D1 is a medium sized vessel, it is the largest diagonal branch, it is stented (this was described as a ramus previously although it is a high diag), there is eccentric 50 to 75% stenosis in the proximal-mid segment of this vessel, it is more prominent in the mid segment where there is significant ISR.       D2 is a small to medium sized vessel with proximal-mid 50 to 60% stenosis.         LCX At least moderately tortuous with a retroflexed from the left main course with heavy calcification and probable stents in the proximal segment with eccentric 60% in the proximal/mid segment and 80% distal stenosis.         RI See above regarding high diagonal artery branch         RCA Dominant, heavily calcified, stents are noted throughout, there is pmid-distal serial/tandem 90% stenoses.  Proximally there is 60% stenosis.      Left subclavian and LIMA are widely patent      Echo 7/5    Left Ventricle: Mildly reduced left ventricular systolic function with a visually estimated EF of 45 - 50%. Moderate hypokinesis of the following segments: basal anterior and mid anterior.    Right Atrium: Question calcified atrial structure vs mass noted in the right atrium. This was visualized in previous echo on 4/1/24.This is consistent with his dialysis catheter,    Pericardium: Small (<1 cm) pericardial effusion present.    Limited echocardiogram.    Image quality is technically difficult. Technically difficult study and technically difficult study due to patient's body habitus.      Stress myoview 7/5 reviewed as above    Echo 04/01/2024: Summary   Technically difficult examination.   Patient did not want to continue with exam, definity not used.      Left ventricular systolic  Degeneration of lumbar or lumbosacral intervertebral disc    Lumbar radiculopathy    Lumbosacral spondylosis without myelopathy    Biliary colic    Symptomatic cholelithiasis    Gastroparesis due to DM ([facility])    Elevated troponin    Angina, class IV ([facility])    Dyspnea    Dyslipidemia    Chronic systolic CHF (congestive heart failure) ([facility])    Ischemic cardiomyopathy    Tobacco abuse    CVA (cerebral vascular accident) ([facility])    History of CVA (cerebrovascular accident)    Type 2 diabetes mellitus without complication, without long-term current use of insulin ([facility])    ZAINAB treated with BiPAP    Pleural effusion    Chronic anemia    Nonrheumatic aortic valve stenosis    Mucus plugging of bronchi    Hemodialysis-associated hypotension    ESRD on dialysis ([facility])    Hypotension due to drugs    Acute diastolic CHF (congestive heart failure) ([facility])    Neuromuscular disorder ([facility])    Renovascular hypertension    Mixed hyperlipidemia    Cigarette nicotine dependence in remission    Pulmonary edema    Fluid overload    Anemia of chronic disease    SOB (shortness of breath) on exertion    Steal syndrome of dialysis vascular access ([facility])    Chronic, continuous use of opioids    Chronic bronchitis ([facility])    Nasal congestion    Hypercholesteremia    Bradycardia    S/P TAVR (transcatheter aortic valve replacement)    Syncope and collapse    PAF (paroxysmal atrial fibrillation) ([facility])    Bilateral leg weakness    GBS (Guillain-[place] syndrome) ([facility])    Sinus pause    Weakness of both lower extremities    Sinus bradycardia    Ataxia    Peripheral vascular occlusive disease ([facility])    Cellulitis of right foot    Iliac artery occlusion, right ([facility])    Cellulitis and abscess of hand    Uncontrolled type 2 diabetes mellitus with hyperglycemia ([facility])    Acute encephalopathy    Acute hypoxemic respiratory failure ([facility])    Acute CVA (cerebrovascular accident) ([facility])    Speech problem    Urinary tract infection with hematuria    Respiratory failure with

## 2024-07-12 NOTE — PROGRESS NOTES
K[employer]orrential.DNP [employer]  Nephrology Follow up Note           Reason for Consult: ESRD  Requesting Physician:  Dr. Lawrence    Sub/interval history    Doing ok  Had angiogram showing multiple lesions  No new symptoms  Did well with dialysis last treatment     ROS: No n/v, no edema   PSFH: No visitor    Scheduled Meds:   sodium chloride flush  5-40 mL IntraVENous 2 times per day    docusate sodium  100 mg Oral Daily    epoetin siddharth-epbx  10,000 Units IntraVENous Once per day on Tue Thu Sat    heparin (porcine)  5,000 Units SubCUTAneous 3 times per day    busPIRone  10 mg Oral TID    calcium acetate  2 capsule Oral TID    DULoxetine  60 mg Oral Daily    insulin glargine  15 Units SubCUTAneous Nightly    metoprolol succinate  25 mg Oral Daily    pravastatin  40 mg Oral Daily    pregabalin  25 mg Oral Daily    QUEtiapine  50 mg Oral Nightly    sacubitril-valsartan  1 tablet Oral BID    traZODone  50 mg Oral Daily    venlafaxine  75 mg Oral Daily    aspirin  81 mg Oral Daily    [Held by provider] clopidogrel  75 mg Oral Daily    pantoprazole  40 mg Oral BID AC    sodium chloride flush  5-40 mL IntraVENous 2 times per day    insulin lispro  0-4 Units SubCUTAneous TID WC    insulin lispro  0-4 Units SubCUTAneous Nightly     Continuous Infusions:   sodium chloride      sodium chloride      dextrose      sodium chloride       PRN Meds:.sodium chloride flush, sodium chloride, heparin (porcine), oxyCODONE, ipratropium 0.5 mg-albuterol 2.5 mg, sodium chloride flush, sodium chloride, glucose, dextrose bolus **OR** dextrose bolus, glucagon (rDNA), dextrose, sodium chloride, acetaminophen **OR** acetaminophen, polyethylene glycol, prochlorperazine    History of Present Illness on 7/4/2024:    54 y.o. yo male with PMH of ESRD, CAD status post PCI, CHF, COPD, HTN, DM, CVA and HTN, PAD status post iliac stents who is admitted for chest pain  Patient is over 5 kg from his target weight and nephrology has been asked to see him for dialysis  needs  Given his cardiac history, cardiology was consulted and they are planning on stress test and echocardiogram in the morning    Physical exam:   Constitutional:  VITALS:  BP (!) 168/99   Pulse 68   Temp 97.7 °F (36.5 °C) (Oral)   Resp 16   Ht 1.753 m (5' 9\")   Wt 94.3 kg (207 lb 14.3 oz)   SpO2 98%   BMI 30.70 kg/m²   Gen: alert, awake  Neck: No JVD  Skin: Unremarkable  Cardiovascular:  S1, S2 without m/r/g   Respiratory: CTA B without w/r/r; on CPAP  Abdomen:  soft, nt, nd,   Extremities: no lower extremity edema  Neuro/Psy: AAoriented times 3 ; moves all 4 ext    Data/  Recent Labs     07/10/24  0600 07/11/24  0806 07/12/24  0716   WBC 6.7 7.2 6.4   HGB 9.5* 8.5* 8.7*   HCT 30.6* 27.0* 27.6*   MCV 85.0 83.7 84.6    273 271       Recent Labs     07/09/24  2355 07/10/24  0600 07/11/24  0806 07/12/24  0716   * 132* 129* 136   K 4.9 5.3* 6.1* 5.0   CL 93* 96* 93* 97*   CO2 24 25 23 24   GLUCOSE 144* 99 166* 153*   PHOS 2.7  --  4.5  --    MG 2.10 2.10 2.10 1.90   BUN 52* 56* 79* 56*   CREATININE 5.3* 5.7* 7.1* 5.6*   LABGLOM 12* 11* 8* 11*         Assessment  -ESRD on HD Tuesday Thursday Saturday at [facility]  -Hyperkalemia  -Anemia  -CKD/MBD  -CHF with reduced EF  -History of CAD  -Chest pain per cardiology   Angiogram with significant CAD, CT surgery has been consulted for possible CABG next week.  He is thinking about it   -Nausea vomiting with heartburn per primary team    Plan  -HD per TTS schedule   TW of 93 kg   -Retacrit 10K units w HD   -Renal diet  -Serial labs      Thank you for the consultation.  Please do not hesitate to call with questions.    Miranda Neil MD  Office: [phone]  Fax:    [phone]  [facility]

## 2024-07-12 NOTE — PROGRESS NOTES
07/12/24 1557   Encounter Summary   Encounter Overview/Reason Attempted Encounter  (Patient was sleeping)   Service Provided For Patient   Support System Unknown   Last Encounter  07/12/24  ( left a prayer card and said a silent prayer)   Assessment/Intervention/Outcome   Assessment Unable to assess   Intervention Prayer (assurance of)/[place]

## 2024-07-12 NOTE — PROGRESS NOTES
Shift assessment done. All night time medications given per MAR. Patient took all his oral medications whole with water, tolerated well. Dressing to right foot clean , dry and intact. All fall precautions implemented. All needs attended. Electronically signed by Marylin Tran RN on 7/11/2024 at 11:50 PM

## 2024-07-12 NOTE — PROGRESS NOTES
V2.0    [facility] Progress Note      Name:  Igor Ann /Age/Sex: 1970  (54 y.o. male)   MRN & CSN:  [id number] & [id number] Encounter Date/Time: 2024 9:14 AM EDT   Location:  044 PCP: Vonnie Cleveland APRN - NP     Attending:Naveed Munson MD       Hospital Day: 4    Assessment and Recommendations   Igor Ann is a 54 y.o. male with pmh of COPD, CAD status post 3 stents, ESRD on dialysis, GERD, heart failure, type 2 diabetes, and ZAINAB who presents with Coronary artery disease, unspecified vessel or lesion type, unspecified whether angina present, unspecified whether native or transplanted heart      Plan:   Chest pain/CAD status post 3 stents   -Ramus, left circumflex, right coronary artery stent placed in   -Troponins flat trajectory, EKG with T wave inversions new compared to previous  -Cardiology consulted  -Cath cath today showed restenosis of the ramus, left circumflex, right coronary artery  -Patient received 3 additional stents  in the subsequent arteries listed above  -Continue aspirin 81  -Continue pravastatin 40 mg  -Holding Plavix by CT surgery anticipation for CABG  -Eliquis currently on hold  -CT surgery consulted and prepping for CABG   -Will repeat echo on July 15    ESRD on dialysis  -Hemodialysis on ,  -Nephrology has been consulted    Heart failure reduced EF  -Echo 7 5 showed 45 to 50%  -Continue Entresto  -Continue metoprolol  -CHF order set in place    Type 2 diabetes  Continue 15 units nightly  Low-dose sliding scale    GERD  -Possible hematemesis  -GI consulted  -EGD negative  -Will continue pantoprazole 40 mg twice daily    Chronic normocytic anemia  Continue monitor CBC  No signs of bleeding, holding Eliquis  Secondary to CKD  Patient gets Retacrit    Chronic osteomyelitis  Status post foot I&D with MT bone resection  Patient finished antibiotics and was following with podiatry  Wound care consulted.  Podiatry  05/23/2024 08:00 PM    GLUCOSEU 500 05/23/2024 08:00 PM    KETUA Negative 05/23/2024 08:00 PM    AMORPHOUS 3+ 03/10/2020 01:58 PM     Urine Cultures:   Lab Results   Component Value Date/Time    LABURIN  05/23/2024 08:00 PM     <50,000 CFU/ml mixed skin/urogenital mauri. No further workup     Blood Cultures:   Lab Results   Component Value Date/Time    BC No Growth after 4 days of incubation. 05/02/2024 04:55 PM     Lab Results   Component Value Date/Time    BLOODCULT2 No Growth after 4 days of incubation. 05/02/2024 05:16 PM     Organism:   Lab Results   Component Value Date/Time    ORG Staphylococcus epidermidis 05/02/2024 01:23 PM         Electronically signed by Lelo Trinh DO on 7/12/2024 at 2:01 PM

## 2024-07-12 NOTE — PROGRESS NOTES
07/11/24 2228   NIV Type   NIV Started/Stopped On   Equipment Type v60   Mode Bilevel   Mask Type Full face mask   Mask Size Medium   Assessment   Respirations 20   Comfort Level Good   Using Accessory Muscles No   Mask Compliance Good   Skin Assessment Clean, dry, & intact   Skin Protection for O2 Device (S)  No  (PT REFUSING)   Settings/Measurements   PIP Observed 12 cm H20   IPAP 12 cmH20   CPAP/EPAP 6 cmH2O   Vt (Measured) 454 mL   Rate Ordered 12   FiO2  40 %   I Time/ I Time % 1 s   Minute Volume (L/min) 8.9 Liters   Mask Leak (lpm) 32 lpm   Patient's Home Machine No   Alarm Settings   Alarms On Y   Low Pressure (cmH2O) 6 cmH2O   High Pressure (cmH2O) 30 cmH2O   Apnea (secs) 20 secs   RR Low (bpm) 6   RR High (bpm) 40 br/min

## 2024-07-12 NOTE — PROGRESS NOTES
CHF Care Plan      Patient's EF (Ejection Fraction) is greater than 40%    Heart Failure Medications:  Diuretics:: None    (One of the following REQUIRED for EF </= 40%/SYSTOLIC FAILURE but MAY be used in EF% >40%/DIASTOLIC FAILURE)        ACE:: None        ARB:: None         ARNI:: Sacubitril/Valsartan-Entresto    (Beta Blockers)  NON- Evidenced Based Beta Blocker (for EF% >40%/DIASTOLIC FAILURE): None    Evidenced Based Beta Blocker::(REQUIRED for EF% <40%/SYSTOLIC FAILURE) Metoprolol SUCCinate- Toprol XL  ...................................................................................................................................................    Failed to redirect to the Timeline version of the [employer] Smart[employer].      Patient's weights and intake/output reviewed    Daily Weight log at bedside, patient/family participation in use of log: \"yes    Patient's current weight today shows a difference of 1 lbs more than last documented weight. However, most recent weight was a bed weight because patient refused to stand on scale.       Intake/Output Summary (Last 24 hours) at 7/12/2024 0949  Last data filed at 7/12/2024 0616  Gross per 24 hour   Intake 240 ml   Output 0 ml   Net 240 ml         Education Booklet Provided: yes    Comorbidities Reviewed Yes    Patient has a past medical history of Ambulatory dysfunction, Aortic stenosis, Arthritis, Asthma, Bilateral hilar adenopathy syndrome, CAD (coronary artery disease), Cardiomyopathy (HCC), CHF (congestive heart failure) ([facility]), Chronic pain, COPD (chronic obstructive pulmonary disease) (HCC), CVA (cerebral vascular accident) (HCC), Depression, Diabetes mellitus (HCC), Difficult intravenous access, Emphysema of lung (HCC), ESRD (end stage renal disease) on dialysis ([facility]), Fear of needles, Gastric ulcer, GERD (gastroesophageal reflux disease), Heart valve problem, Hemodialysis patient (HCC), History of spinal fracture, History of transcatheter aortic valve  replacement (TAVR), Hx of blood clots, Hyperlipidemia, Hypertension, MI (myocardial infarction) (HCC), Neuromuscular disorder (HCC), Numbness and tingling in left arm, Pneumonia, PONV (postoperative nausea and vomiting), Prolonged emergence from general anesthesia, Sleep apnea, Stroke (HCC), TIA (transient ischemic attack), and Unspecified diseases of blood and blood-forming organs.     >>For CHF and Comorbidity documentation on Education Time and Topics, please see Education Tab      Pt resting in bed at this time on  4 L O2. Pt with complaints of shortness of breath. Pt without lower extremity edema.     Patient and/or Family's stated Goal of Care this Admission: reduce shortness of breath, increase activity tolerance, better understand heart failure and disease management, be more comfortable, and reduce lower extremity edema prior to discharge        :

## 2024-07-12 NOTE — PROGRESS NOTES
CHF Care Plan      Patient's EF (Ejection Fraction) is greater than 40%    Heart Failure Medications:  Diuretics:: None    (One of the following REQUIRED for EF </= 40%/SYSTOLIC FAILURE but MAY be used in EF% >40%/DIASTOLIC FAILURE)        ACE:: None        ARB:: None         ARNI:: Sacubitril/Valsartan-Entresto    (Beta Blockers)  NON- Evidenced Based Beta Blocker (for EF% >40%/DIASTOLIC FAILURE): None    Evidenced Based Beta Blocker::(REQUIRED for EF% <40%/SYSTOLIC FAILURE) Metoprolol SUCCinate- Toprol XL  ...................................................................................................................................................    Failed to redirect to the Timeline version of the [employer] SmartLink.      Patient's weights and intake/output reviewed    Daily Weight log at bedside, patient/family participation in use of log: \"yes    Patient's current weight today shows a difference of 1 lbs more than last documented weight.      Intake/Output Summary (Last 24 hours) at 7/12/2024 0146  Last data filed at 7/11/2024 2339  Gross per 24 hour   Intake 802 ml   Output --   Net 802 ml       Education Booklet Provided: yes    Comorbidities Reviewed Yes    Patient has a past medical history of Ambulatory dysfunction, Aortic stenosis, Arthritis, Asthma, Bilateral hilar adenopathy syndrome, CAD (coronary artery disease), Cardiomyopathy (HCC), CHF (congestive heart failure) ([facility]), Chronic pain, COPD (chronic obstructive pulmonary disease) (HCC), CVA (cerebral vascular accident) ([facility]), Depression, Diabetes mellitus (HCC), Difficult intravenous access, Emphysema of lung (HCC), ESRD (end stage renal disease) on dialysis ([facility]), Fear of needles, Gastric ulcer, GERD (gastroesophageal reflux disease), Heart valve problem, Hemodialysis patient (HCC), History of spinal fracture, History of transcatheter aortic valve replacement (TAVR), Hx of blood clots, Hyperlipidemia, Hypertension, MI (myocardial infarction) ([facility]),  Neuromuscular disorder (HCC), Numbness and tingling in left arm, Pneumonia, PONV (postoperative nausea and vomiting), Prolonged emergence from general anesthesia, Sleep apnea, Stroke (HCC), TIA (transient ischemic attack), and Unspecified diseases of blood and blood-forming organs.     >>For CHF and Comorbidity documentation on Education Time and Topics, please see Education Tab      Pt resting in bed at this time on  4 L O2. Pt with complaints of shortness of breath. Pt without lower extremity edema.     Patient and/or Family's stated Goal of Care this Admission: reduce shortness of breath, increase activity tolerance, better understand heart failure and disease management, be more comfortable, and reduce lower extremity edema prior to discharge        :

## 2024-07-12 NOTE — PROGRESS NOTES
Patient has declined medications most of the day d/t wanting to sleep & feeling tired. Finally agreeable to take daily medications at 1512.

## 2024-07-12 NOTE — CONSULTS
Department of Orthopedic Surgery  Dr. Cely Rodriguez  Consult Note        Reason for Consult:  right foot s/p TMA with graft   Requesting Physician:  Mary Espinoza RN    CHIEF COMPLAINT:  chest pain     History Obtained From:  patient    HISTORY OF PRESENT ILLNESS:                The patient is a 54 y.o. male who presents with PMH of CAD,CHF, COPD, DM2, ESRD on dialysis and s/p right 5th ray amputation right with integra grafting. Patient has been following with Dr. Corrie Berg at our office and was last seen June 27,2024 and wound was debrided to deep fascia and Keracell AG.     Past Medical History:        Diagnosis Date    Ambulatory dysfunction     walker for long distances, SOB with distance    Aortic stenosis     echo 2017    Arthritis     hands and hips    Asthma     Bilateral hilar adenopathy syndrome 06/03/2013    CAD (coronary artery disease)     Dr. Javier Chanel Heart    Cardiomyopathy ([facility]) 04/19/2019    EF= 43%    CHF (congestive heart failure) ([facility])     Chronic pain     COPD (chronic obstructive pulmonary disease) ([facility])     pulmonology Dr. Batista    CVA (cerebral vascular accident) ([facility]) 05/21/2017    Depression     Diabetes mellitus ([facility])     borderline    Difficult intravenous access     Emphysema of lung ([facility])     ESRD (end stage renal disease) on dialysis ([facility])     MWF    Fear of needles     Gastric ulcer     GERD (gastroesophageal reflux disease)     Heart valve problem     bicuspic valve    Hemodialysis patient ([facility])     History of spinal fracture     work incident    History of transcatheter aortic valve replacement (TAVR)     reported by wife    Hx of blood clots     Bilateral lower extremities; stents in place    Hyperlipidemia     Hypertension     MI (myocardial infarction) ([facility]) 2019    has had 9 MIs. 2019 was the last    Neuromuscular disorder ([facility])     due to CVA    Numbness and tingling in left arm     from fistula    Pneumonia     PONV (postoperative nausea and vomiting)      consent with nurse present in room for ulcer debridement.   Sharp excisional debridement full-thickness and including deep fascia to remove any non viable tissue and promote healing. Flushed with saline and applied Xeroform, 4x4's and Kerlix.   Will re-consult for wound care for dressing changes every other day.    Due to lengthy stay at hospital- possible weekly debridements by out team  Follow up in office after discharge with Dr. Corrie Berg.   Diabetes type 2 with neuropathy, controlled   CAD   Plan for CABG by cardiothoracic

## 2024-07-12 NOTE — PROGRESS NOTES
CVTS Thoracic Progress Note:          CC:  Pre- op follow up    Subj: Pt resting in bed, comfortable.     Obj:    Blood pressure 119/73, pulse 63, temperature 97.7 °F (36.5 °C), temperature source Axillary, resp. rate 17, height 1.753 m (5' 9\"), weight 94.3 kg (207 lb 14.3 oz), SpO2 100 %.    Lungs CTAB   Cardiac S1S2 no M/R/G   Abdomen Soft and non-tender     Diagnostics:   PT/INR:    Lab Results   Component Value Date/Time    PROTIME 17.4 07/03/2024 04:44 PM    INR 1.40 07/03/2024 04:44 PM     Troponin:    Lab Results   Component Value Date/Time    TROPONINI 0.11 04/18/2023 01:27 AM     Recent Labs     07/10/24  0600 07/11/24  0806 07/12/24  0716   WBC 6.7 7.2 6.4   HGB 9.5* 8.5* 8.7*   HCT 30.6* 27.0* 27.6*    273 271                                                                  Recent Labs     07/09/24  2355 07/10/24  0600 07/11/24  0806 07/12/24  0716   * 132* 129* 136   K 4.9 5.3* 6.1* 5.0   CL 93* 96* 93* 97*   CO2 24 25 23 24   BUN 52* 56* 79* 56*   CREATININE 5.3* 5.7* 7.1* 5.6*   GLUCOSE 144* 99 166* 153*   PHOS 2.7  --  4.5  --             Recent Labs     07/10/24  0600 07/11/24  0806 07/12/24  0716   MG 2.10 2.10 1.90        Assess/Plan:   54 year old with significant past medical history of CVA (2017), ZAINAB on CPAP, multiple MI (most recently 2019), CAD s/p multiple stents (most recently May 2015), ESRD on HD T/T/S, DM II, COPD, CHF, GERD, PVD s/p bilateral iliac stenting on Plavix, TAVR (10/2019 with Dr. Holland), & prior VTE on Eliquis with multivessel CAD including Distal LAD, 1st diag, Lcx and RCA. EF 35% on ECHO from 4/1 and 45% on 7/5, EF 20%  .   -Repeat ECHO on Monday 7/15  -HD on Tuesday 7/14  -Pre-op testing ordered, and reviewed  -Hold Plavix   -Last dose of Eliquis 7/5  -Hold Entresto 48hr prior to surgery  -Medically need to optimize pt prior to surgery. Will consult IR for thoracentesis and to place VasCath and Bowler catheter if possible on Monday.   -Plan for CABG x 3-4 with

## 2024-07-12 NOTE — CARE COORDINATION
LOS 3.  Care managed by Hosp Med, Card, Neph, CV surg. Here w CAD, ESRD. Plan CABG 7/17. From [facility] LTC w HD there. Plan return- no barrier- currently on bed hold. May need cert if returns w skilled needs-likely after OR.  CM following. Mady Miranda, RN

## 2024-07-12 NOTE — PLAN OF CARE
Problem: Safety - Adult  Goal: Free from fall injury  7/11/2024 2347 by Marylin Tran RN  Outcome: Progressing  7/11/2024 1420 by Vonnie Mathews RN  Outcome: Progressing     Problem: Skin/Tissue Integrity  Goal: Absence of new skin breakdown  Description: 1.  Monitor for areas of redness and/or skin breakdown  2.  Assess vascular access sites hourly  3.  Every 4-6 hours minimum:  Change oxygen saturation probe site  4.  Every 4-6 hours:  If on nasal continuous positive airway pressure, respiratory therapy assess nares and determine need for appliance change or resting period.  7/11/2024 2347 by Marylin Tran RN  Outcome: Progressing  7/11/2024 1420 by Vonnie Mathews RN  Outcome: Progressing     Problem: Pain  Goal: Verbalizes/displays adequate comfort level or baseline comfort level  Outcome: Progressing     Problem: Nutrition Deficit:  Goal: Optimize nutritional status  7/11/2024 2347 by Marylin Tran RN  Outcome: Progressing  7/11/2024 1420 by Vonnie Mathews RN  Outcome: Progressing     Problem: Chronic Conditions and Co-morbidities  Goal: Patient's chronic conditions and co-morbidity symptoms are monitored and maintained or improved  Outcome: Progressing     Problem: Discharge Planning  Goal: Discharge to home or other facility with appropriate resources  7/11/2024 2347 by Marylin Tran RN  Outcome: Progressing  7/11/2024 1420 by Vonnie Mathews RN  Outcome: Progressing

## 2024-07-12 NOTE — PROGRESS NOTES
Bedside spirometry complete at this time. Patient had good effort, results will be placed in patients physical copy.

## 2024-07-13 LAB
ANION GAP SERPL CALCULATED.3IONS-SCNC: 11 MMOL/L (ref 3–16)
BUN SERPL-MCNC: 75 MG/DL (ref 7–20)
CALCIUM SERPL-MCNC: 9.3 MG/DL (ref 8.3–10.6)
CHLORIDE SERPL-SCNC: 96 MMOL/L (ref 99–110)
CO2 SERPL-SCNC: 24 MMOL/L (ref 21–32)
CREAT SERPL-MCNC: 7.2 MG/DL (ref 0.9–1.3)
DEPRECATED RDW RBC AUTO: 17.8 % (ref 12.4–15.4)
GFR SERPLBLD CREATININE-BSD FMLA CKD-EPI: 8 ML/MIN/{1.73_M2}
GLUCOSE BLD-MCNC: 141 MG/DL (ref 70–99)
GLUCOSE BLD-MCNC: 203 MG/DL (ref 70–99)
GLUCOSE BLD-MCNC: 288 MG/DL (ref 70–99)
GLUCOSE SERPL-MCNC: 129 MG/DL (ref 70–99)
HCT VFR BLD AUTO: 26.4 % (ref 40.5–52.5)
HGB BLD-MCNC: 8.4 G/DL (ref 13.5–17.5)
MAGNESIUM SERPL-MCNC: 1.9 MG/DL (ref 1.8–2.4)
MCH RBC QN AUTO: 26.9 PG (ref 26–34)
MCHC RBC AUTO-ENTMCNC: 31.9 G/DL (ref 31–36)
MCV RBC AUTO: 84.4 FL (ref 80–100)
PERFORMED ON: ABNORMAL
PLATELET # BLD AUTO: 281 K/UL (ref 135–450)
PMV BLD AUTO: 7.4 FL (ref 5–10.5)
POTASSIUM SERPL-SCNC: 5.6 MMOL/L (ref 3.5–5.1)
RBC # BLD AUTO: 3.12 M/UL (ref 4.2–5.9)
SODIUM SERPL-SCNC: 131 MMOL/L (ref 136–145)
WBC # BLD AUTO: 7.3 K/UL (ref 4–11)

## 2024-07-13 PROCEDURE — 2060000000 HC ICU INTERMEDIATE R&B

## 2024-07-13 PROCEDURE — 99233 SBSQ HOSP IP/OBS HIGH 50: CPT | Performed by: INTERNAL MEDICINE

## 2024-07-13 PROCEDURE — 36415 COLL VENOUS BLD VENIPUNCTURE: CPT

## 2024-07-13 PROCEDURE — 94761 N-INVAS EAR/PLS OXIMETRY MLT: CPT

## 2024-07-13 PROCEDURE — 2700000000 HC OXYGEN THERAPY PER DAY

## 2024-07-13 PROCEDURE — 90935 HEMODIALYSIS ONE EVALUATION: CPT

## 2024-07-13 PROCEDURE — 6370000000 HC RX 637 (ALT 250 FOR IP)

## 2024-07-13 PROCEDURE — 80048 BASIC METABOLIC PNL TOTAL CA: CPT

## 2024-07-13 PROCEDURE — 6370000000 HC RX 637 (ALT 250 FOR IP): Performed by: NURSE PRACTITIONER

## 2024-07-13 PROCEDURE — 94660 CPAP INITIATION&MGMT: CPT

## 2024-07-13 PROCEDURE — 6370000000 HC RX 637 (ALT 250 FOR IP): Performed by: INTERNAL MEDICINE

## 2024-07-13 PROCEDURE — 6360000002 HC RX W HCPCS: Performed by: STUDENT IN AN ORGANIZED HEALTH CARE EDUCATION/TRAINING PROGRAM

## 2024-07-13 PROCEDURE — 2580000003 HC RX 258: Performed by: INTERNAL MEDICINE

## 2024-07-13 PROCEDURE — 2500000003 HC RX 250 WO HCPCS: Performed by: NURSE PRACTITIONER

## 2024-07-13 PROCEDURE — 2580000003 HC RX 258

## 2024-07-13 PROCEDURE — 83735 ASSAY OF MAGNESIUM: CPT

## 2024-07-13 PROCEDURE — 6360000002 HC RX W HCPCS

## 2024-07-13 PROCEDURE — 85027 COMPLETE CBC AUTOMATED: CPT

## 2024-07-13 RX ORDER — HEPARIN SODIUM 1000 [USP'U]/ML
INJECTION, SOLUTION INTRAVENOUS; SUBCUTANEOUS
Status: COMPLETED
Start: 2024-07-13 | End: 2024-07-13

## 2024-07-13 RX ORDER — ISOSORBIDE MONONITRATE 30 MG/1
30 TABLET, EXTENDED RELEASE ORAL DAILY
Status: DISCONTINUED | OUTPATIENT
Start: 2024-07-13 | End: 2024-07-19 | Stop reason: HOSPADM

## 2024-07-13 RX ADMIN — SODIUM CHLORIDE, PRESERVATIVE FREE 10 ML: 5 INJECTION INTRAVENOUS at 21:55

## 2024-07-13 RX ADMIN — SODIUM CHLORIDE, PRESERVATIVE FREE 10 ML: 5 INJECTION INTRAVENOUS at 12:16

## 2024-07-13 RX ADMIN — INSULIN LISPRO 2 UNITS: 100 INJECTION, SOLUTION INTRAVENOUS; SUBCUTANEOUS at 16:31

## 2024-07-13 RX ADMIN — BUSPIRONE HYDROCHLORIDE 10 MG: 5 TABLET ORAL at 12:11

## 2024-07-13 RX ADMIN — HEPARIN SODIUM 5000 UNITS: 5000 INJECTION INTRAVENOUS; SUBCUTANEOUS at 14:48

## 2024-07-13 RX ADMIN — OXYCODONE 10 MG: 5 TABLET ORAL at 12:12

## 2024-07-13 RX ADMIN — PRAVASTATIN SODIUM 40 MG: 40 TABLET ORAL at 12:12

## 2024-07-13 RX ADMIN — VENLAFAXINE HYDROCHLORIDE 75 MG: 37.5 CAPSULE, EXTENDED RELEASE ORAL at 12:12

## 2024-07-13 RX ADMIN — INSULIN GLARGINE 15 UNITS: 100 INJECTION, SOLUTION SUBCUTANEOUS at 21:48

## 2024-07-13 RX ADMIN — ASPIRIN 81 MG 81 MG: 81 TABLET ORAL at 12:12

## 2024-07-13 RX ADMIN — BUSPIRONE HYDROCHLORIDE 10 MG: 5 TABLET ORAL at 21:49

## 2024-07-13 RX ADMIN — HEPARIN SODIUM 3600 UNITS: 1000 INJECTION, SOLUTION INTRAVENOUS; SUBCUTANEOUS at 11:32

## 2024-07-13 RX ADMIN — SACUBITRIL AND VALSARTAN 1 TABLET: 24; 26 TABLET, FILM COATED ORAL at 12:11

## 2024-07-13 RX ADMIN — SACUBITRIL AND VALSARTAN 1 TABLET: 24; 26 TABLET, FILM COATED ORAL at 21:49

## 2024-07-13 RX ADMIN — QUETIAPINE FUMARATE 50 MG: 25 TABLET ORAL at 21:49

## 2024-07-13 RX ADMIN — EPOETIN ALFA-EPBX 10000 UNITS: 10000 INJECTION, SOLUTION INTRAVENOUS; SUBCUTANEOUS at 10:43

## 2024-07-13 RX ADMIN — METOPROLOL SUCCINATE 25 MG: 25 TABLET, EXTENDED RELEASE ORAL at 12:13

## 2024-07-13 RX ADMIN — HEPARIN SODIUM 5000 UNITS: 5000 INJECTION INTRAVENOUS; SUBCUTANEOUS at 21:49

## 2024-07-13 RX ADMIN — ISOSORBIDE MONONITRATE 30 MG: 30 TABLET, EXTENDED RELEASE ORAL at 14:48

## 2024-07-13 RX ADMIN — CALCIUM ACETATE 1334 MG: 667 CAPSULE ORAL at 12:12

## 2024-07-13 RX ADMIN — PANTOPRAZOLE SODIUM 40 MG: 40 TABLET, DELAYED RELEASE ORAL at 16:30

## 2024-07-13 RX ADMIN — PREGABALIN 25 MG: 25 CAPSULE ORAL at 06:51

## 2024-07-13 RX ADMIN — DULOXETINE HYDROCHLORIDE 60 MG: 60 CAPSULE, DELAYED RELEASE ORAL at 12:11

## 2024-07-13 RX ADMIN — TRAZODONE HYDROCHLORIDE 50 MG: 50 TABLET ORAL at 21:48

## 2024-07-13 RX ADMIN — OXYCODONE 10 MG: 5 TABLET ORAL at 16:30

## 2024-07-13 RX ADMIN — PANTOPRAZOLE SODIUM 40 MG: 40 TABLET, DELAYED RELEASE ORAL at 06:51

## 2024-07-13 RX ADMIN — OXYCODONE 10 MG: 5 TABLET ORAL at 21:52

## 2024-07-13 RX ADMIN — HEPARIN SODIUM 5000 UNITS: 5000 INJECTION INTRAVENOUS; SUBCUTANEOUS at 06:52

## 2024-07-13 RX ADMIN — HEPARIN SODIUM 3600 UNITS: 1000 INJECTION INTRAVENOUS; SUBCUTANEOUS at 11:32

## 2024-07-13 RX ADMIN — DOCUSATE SODIUM 100 MG: 100 CAPSULE, LIQUID FILLED ORAL at 12:12

## 2024-07-13 RX ADMIN — CALCIUM ACETATE 1334 MG: 667 CAPSULE ORAL at 21:49

## 2024-07-13 ASSESSMENT — PAIN SCALES - GENERAL
PAINLEVEL_OUTOF10: 8
PAINLEVEL_OUTOF10: 9
PAINLEVEL_OUTOF10: 0
PAINLEVEL_OUTOF10: 0
PAINLEVEL_OUTOF10: 7

## 2024-07-13 ASSESSMENT — PAIN DESCRIPTION - PAIN TYPE: TYPE: CHRONIC PAIN

## 2024-07-13 ASSESSMENT — PAIN DESCRIPTION - DESCRIPTORS: DESCRIPTORS: ACHING

## 2024-07-13 ASSESSMENT — PAIN DESCRIPTION - LOCATION
LOCATION: BACK
LOCATION: FOOT

## 2024-07-13 NOTE — FLOWSHEET NOTE
07/13/24 0733 07/13/24 1108   Vital Signs   BP  --  (!) 149/73   Temp 97.8 °F (36.6 °C) 98.1 °F (36.7 °C)   Pulse 72 89   Respirations 16 16       Treatment time: 3.5 hours  Net UF: 3.3 L    Pre weight: 98.5 kg (bed scale, stated not able to stand)  Post weight: 95.2 kg (bed scale, stated not able to stand)  EDW: 93 kg    Access used: LIJ HD tunneled cath  Access function: No problems     Medications or blood products given: Retacrit 78120 units IVP.    Regular outpatient schedule: [facility]    Summary of response to treatment: Tolerated 3.5 hour HD tx without difficulty. Vital signs stable throughout tx.  Area left of exit site with brown drainage/area reddened.  Area cleansed with alcohol, sterile dry 2x2 drsg to site using paper tape to secure.  Appears to  be allergic to CHG/tegaderm dressing.    Crit line H1=26.8, H2=26.8, no difference, no refill present.  Ending profile A.    Copy of dialysis treatment record placed in chart, to be scanned into EMR.    Report called to Sabrina Barger RN.   Called n o answer

## 2024-07-13 NOTE — PLAN OF CARE
Has been non-compliant at times with taking meds.  Problem: Chronic Conditions and Co-morbidities  Goal: Patient's chronic conditions and co-morbidity symptoms are monitored and maintained or improved  Outcome: Not Progressing

## 2024-07-13 NOTE — PROGRESS NOTES
[facility]   PROGRESS NOTE  [phone]      Attending Physician: Naveed Munson MD  Reason for Consultation/Chief Complaint: CAD    Subjective     Patient reports that he has been having chest pressure that waxes and wanes and he currently rates a 5/10.  However, he is currently lying very comfortably in bed and does not appear to be in any acute distress.  He does note some mild shortness of breath.  Had HD earlier this morning.      CURRENT Medications:  isosorbide mononitrate (IMDUR) extended release tablet 30 mg, Daily  sodium chloride flush 0.9 % injection 5-40 mL, 2 times per day  sodium chloride flush 0.9 % injection 5-40 mL, PRN  0.9 % sodium chloride infusion, PRN  docusate sodium (COLACE) capsule 100 mg, Daily  heparin (porcine) injection 3,600 Units, PRN  epoetin siddharth-epbx (RETACRIT) injection 10,000 Units, Once per day on Tue Thu Sat  heparin (porcine) injection 5,000 Units, 3 times per day  oxyCODONE (ROXICODONE) immediate release tablet 10 mg, Q4H PRN  busPIRone (BUSPAR) tablet 10 mg, TID  calcium acetate (PHOSLO) capsule 1,334 mg, TID  DULoxetine (CYMBALTA) extended release capsule 60 mg, Daily  insulin glargine (LANTUS) injection vial 15 Units, Nightly  ipratropium 0.5 mg-albuterol 2.5 mg (DUONEB) nebulizer solution 1 Dose, Q4H PRN  metoprolol succinate (TOPROL XL) extended release tablet 25 mg, Daily  pravastatin (PRAVACHOL) tablet 40 mg, Daily  pregabalin (LYRICA) capsule 25 mg, Daily  QUEtiapine (SEROQUEL) tablet 50 mg, Nightly  sacubitril-valsartan (ENTRESTO) 24-26 MG per tablet 1 tablet, BID  traZODone (DESYREL) tablet 50 mg, Daily  venlafaxine (EFFEXOR XR) extended release capsule 75 mg, Daily  aspirin chewable tablet 81 mg, Daily  [Held by provider] clopidogrel (PLAVIX) tablet 75 mg, Daily  pantoprazole (PROTONIX) tablet 40 mg, BID AC  sodium chloride flush 0.9 % injection 5-40 mL, 2 times per day  sodium chloride flush 0.9 % injection 5-40 mL, PRN  0.9 % sodium

## 2024-07-13 NOTE — PROGRESS NOTES
V2.0    [facility] [place]      Name:  Igor Ann /Age/Sex: 1970  (54 y.o. male)   MRN & CSN:  [id number] & [id number] Encounter Date/Time: 2024 9:14 AM EDT   Location:  044 PCP: Vonnie Cleveland APRN - NP     Attending:Naveed Munson MD       Hospital Day: 5    Assessment and Recommendations   Igor Ann is a 54 y.o. male with pmh of COPD, CAD status post 3 stents, ESRD on dialysis, GERD, heart failure, type 2 diabetes, and ZAINAB who presents with Coronary artery disease, unspecified vessel or lesion type, unspecified whether angina present, unspecified whether native or transplanted heart      Plan:   Chest pain/CAD status post 3 stents   -Ramus, left circumflex, right coronary artery stent placed in   -Troponins flat trajectory, EKG with T wave inversions new compared to previous  -Cardiology consulted  -Cath cath today showed restenosis of the ramus, left circumflex, right coronary artery  -Patient received 3 additional stents  in the subsequent arteries listed above  -Continue aspirin 81  -Continue pravastatin 40 mg  -Holding Plavix by CT surgery anticipation for CABG  -Eliquis currently on hold  -CT surgery consulted and prepping for CABG   -Will repeat echo on July 15  -Plan unchanged    ESRD on dialysis  -Hemodialysis on ,  -Nephrology has been consulted    Heart failure reduced EF  -Previous echo showed 45 to 50%  -Continue Entresto  -Continue metoprolol  -CHF order set in place  -Echo on Monday    Type 2 diabetes  Continue 15 units nightly  Low-dose sliding scale    GERD  -Possible hematemesis  -GI consulted  -EGD negative  -Will continue pantoprazole 40 mg twice daily    Chronic normocytic anemia  Continue monitor CBC  No signs of bleeding, holding Eliquis  Secondary to CKD  Patient gets Retacrit    Chronic osteomyelitis  Status post foot I&D with MT bone resection  Patient finished antibiotics and was following with  echocardiogram.   Image quality is technically difficult. Technically difficult study and technically difficult study due to patient's body habitus.     Nuclear stress test with myocardial perfusion    Result Date: 7/5/2024    Stress Combined Conclusion: This is an abnormal pharmacological myocardial perfusion study. Findings suggest a high risk of cardiac events.   Perfusion Comments: LV perfusion is abnormal.   Perfusion Defect: There are multiple perfusion defects. There is a moderate sized perfusion defect involving the apical cap, apicolateral wall, and apical anterior wall of moderate to severe intensity that is reversible consistent with myocardial ischemia.There is a moderate to large sized perfusion defect involving the anterior wall that appears reversibile consistent with myocardial ischemia. Additionally, there is a large sized perfusion defect involving the inferior wall of moderate intensity with partial reversibility. This is most consistent with prior infarct and darrick-infarct ischemia.   Stress Function: Left ventricular size appears mild-moderately dilated. There is severe global hypokinesis. Left ventricular function is moderate-severely reduced with calculated LVEF of 37%.   Perfusion Conclusion: There is no evidence of transient ischemic dilation (TID).   Image quality is good.   Stress Test: A pharmacological stress test was performed using regadenoson (Lexiscan). 100 mg of aminophylline given as a reversal agent. The patient reported chest discomfort, dizziness and dyspnea during the stress test. Symptoms began during stress and ended during recovery. The patient reached the end of the protocol.   ECG: The stress ECG was not diagnostic due to resting ST-T abnormalities, failure to achieve target heart rate and pharmacologic protocol.     XR CHEST PORTABLE    Result Date: 7/3/2024  EXAMINATION: ONE XRAY VIEW OF THE CHEST 7/3/2024 4:38 pm COMPARISON: Chest x-ray dated 24 May 2024 HISTORY:

## 2024-07-13 NOTE — PLAN OF CARE
CHF Care Plan      Patient's EF (Ejection Fraction) is greater than 40%    Heart Failure Medications:  Diuretics:: None    (One of the following REQUIRED for EF </= 40%/SYSTOLIC FAILURE but MAY be used in EF% >40%/DIASTOLIC FAILURE)        ACE:: None        ARB:: None         ARNI:: Sacubitril/Valsartan-Entresto    (Beta Blockers)  NON- Evidenced Based Beta Blocker (for EF% >40%/DIASTOLIC FAILURE): None    Evidenced Based Beta Blocker::(REQUIRED for EF% <40%/SYSTOLIC FAILURE) Metoprolol SUCCinate- Toprol XL  ...................................................................................................................................................    Failed to redirect to the Timeline version of the [employer] SmartLink.      Patient's weights and intake/output reviewed    Daily Weight log at bedside, patient/family participation in use of log: \"yes    Patient's current weight today shows a difference of 8 lbs less than last documented weight. Bed scale weight before and after dialysis      Intake/Output Summary (Last 24 hours) at 7/13/2024 1434  Last data filed at 7/13/2024 0651  Gross per 24 hour   Intake 440 ml   Output 25 ml   Net 415 ml       Education Booklet Provided: yes    Comorbidities Reviewed Yes    Patient has a past medical history of Ambulatory dysfunction, Aortic stenosis, Arthritis, Asthma, Bilateral hilar adenopathy syndrome, CAD (coronary artery disease), Cardiomyopathy (HCC), CHF (congestive heart failure) ([facility]), Chronic pain, COPD (chronic obstructive pulmonary disease) (HCC), CVA (cerebral vascular accident) (HCC), Depression, Diabetes mellitus (HCC), Difficult intravenous access, Emphysema of lung (HCC), ESRD (end stage renal disease) on dialysis ([facility]), Fear of needles, Gastric ulcer, GERD (gastroesophageal reflux disease), Heart valve problem, Hemodialysis patient (HCC), History of spinal fracture, History of transcatheter aortic valve replacement (TAVR), Hx of blood clots, Hyperlipidemia,

## 2024-07-13 NOTE — PLAN OF CARE
CHF Care Plan      Patient's EF (Ejection Fraction) is greater than 40%    Heart Failure Medications:  Diuretics:: None    (One of the following REQUIRED for EF </= 40%/SYSTOLIC FAILURE but MAY be used in EF% >40%/DIASTOLIC FAILURE)        ACE:: None        ARB:: None         ARNI:: Sacubitril/Valsartan-Entresto    (Beta Blockers)  NON- Evidenced Based Beta Blocker (for EF% >40%/DIASTOLIC FAILURE): None    Evidenced Based Beta Blocker::(REQUIRED for EF% <40%/SYSTOLIC FAILURE) Metoprolol SUCCinate- Toprol XL  ...................................................................................................................................................    Failed to redirect to the Timeline version of the [employer] SmartLink.      Patient's weights and intake/output reviewed    Daily Weight log at bedside, patient/family participation in use of log: \"yes    Patient's current weight today shows a difference of 10 lbs more than last documented weight.      Intake/Output Summary (Last 24 hours) at 7/13/2024 0903  Last data filed at 7/13/2024 0236  Gross per 24 hour   Intake 380 ml   Output 0 ml   Net 380 ml       Education Booklet Provided: yes    Comorbidities Reviewed Yes    Patient has a past medical history of Ambulatory dysfunction, Aortic stenosis, Arthritis, Asthma, Bilateral hilar adenopathy syndrome, CAD (coronary artery disease), Cardiomyopathy ([facility]), CHF (congestive heart failure) ([facility]), Chronic pain, COPD (chronic obstructive pulmonary disease) ([facility]), CVA (cerebral vascular accident) ([facility]), Depression, Diabetes mellitus (HCC), Difficult intravenous access, Emphysema of lung ([facility]), ESRD (end stage renal disease) on dialysis ([facility]), Fear of needles, Gastric ulcer, GERD (gastroesophageal reflux disease), Heart valve problem, Hemodialysis patient (HCC), History of spinal fracture, History of transcatheter aortic valve replacement (TAVR), Hx of blood clots, Hyperlipidemia, Hypertension, MI (myocardial infarction)

## 2024-07-13 NOTE — PLAN OF CARE
Problem: Safety - Adult  Goal: Free from fall injury  7/13/2024 1427 by Sabrina Barger RN  Outcome: Progressing  7/13/2024 0931 by Elly Salas RN  Outcome: Progressing  7/13/2024 0728 by Elly Salas RN  Outcome: Progressing     Problem: Chronic Conditions and Co-morbidities  Goal: Patient's chronic conditions and co-morbidity symptoms are monitored and maintained or improved  7/13/2024 1427 by Sabrina Barger RN  Outcome: Progressing  7/13/2024 0931 by Elly Salas RN  Outcome: Not Progressing  7/13/2024 0728 by Elly Salas RN  Outcome: Not Progressing     Problem: Discharge Planning  Goal: Discharge to home or other facility with appropriate resources  Outcome: Progressing     Problem: Skin/Tissue Integrity  Goal: Absence of new skin breakdown  Description: 1.  Monitor for areas of redness and/or skin breakdown  2.  Assess vascular access sites hourly  3.  Every 4-6 hours minimum:  Change oxygen saturation probe site  4.  Every 4-6 hours:  If on nasal continuous positive airway pressure, respiratory therapy assess nares and determine need for appliance change or resting period.  Outcome: Progressing     Problem: Pain  Goal: Verbalizes/displays adequate comfort level or baseline comfort level  Outcome: Progressing     Problem: Nutrition Deficit:  Goal: Optimize nutritional status  Outcome: Progressing     Problem: Chronic Conditions and Co-morbidities  Goal: Patient's chronic conditions and co-morbidity symptoms are monitored and maintained or improved  7/13/2024 1427 by Sabrina Barger RN  Outcome: Progressing  7/13/2024 0931 by Elly Salas RN  Outcome: Not Progressing  7/13/2024 0728 by Elly Salas RN  Outcome: Not Progressing

## 2024-07-13 NOTE — PROGRESS NOTES
[email or web address]  Nephrology Follow up Note           Reason for Consult: ESRD  Requesting Physician:  Dr. Lawrence    Sub/interval history    Doing ok  Had angiogram showing multiple lesions and now planning for CABG next week   No new symptoms  Did well with dialysis today, no cramping or chest pain and tolerated 3 liters UF    ROS: No n/v, no edema   PSFH: No visitor    Scheduled Meds:   isosorbide mononitrate  30 mg Oral Daily    sodium chloride flush  5-40 mL IntraVENous 2 times per day    docusate sodium  100 mg Oral Daily    epoetin siddharth-epbx  10,000 Units IntraVENous Once per day on Tue Thu Sat    heparin (porcine)  5,000 Units SubCUTAneous 3 times per day    busPIRone  10 mg Oral TID    calcium acetate  2 capsule Oral TID    DULoxetine  60 mg Oral Daily    insulin glargine  15 Units SubCUTAneous Nightly    metoprolol succinate  25 mg Oral Daily    pravastatin  40 mg Oral Daily    pregabalin  25 mg Oral Daily    QUEtiapine  50 mg Oral Nightly    sacubitril-valsartan  1 tablet Oral BID    traZODone  50 mg Oral Daily    venlafaxine  75 mg Oral Daily    aspirin  81 mg Oral Daily    [Held by provider] clopidogrel  75 mg Oral Daily    pantoprazole  40 mg Oral BID AC    sodium chloride flush  5-40 mL IntraVENous 2 times per day    insulin lispro  0-4 Units SubCUTAneous TID WC    insulin lispro  0-4 Units SubCUTAneous Nightly     Continuous Infusions:   sodium chloride      sodium chloride      dextrose      sodium chloride       PRN Meds:.sodium chloride flush, sodium chloride, heparin (porcine), oxyCODONE, ipratropium 0.5 mg-albuterol 2.5 mg, sodium chloride flush, sodium chloride, glucose, dextrose bolus **OR** dextrose bolus, glucagon (rDNA), dextrose, sodium chloride, acetaminophen **OR** acetaminophen, polyethylene glycol, prochlorperazine    History of Present Illness on 7/4/2024:    54 y.o. yo male with PMH of ESRD, CAD status post PCI, CHF, COPD, HTN, DM, CVA and HTN, PAD status post iliac stents who  is admitted for chest pain  Patient is over 5 kg from his target weight and nephrology has been asked to see him for dialysis needs  Given his cardiac history, cardiology was consulted and they are planning on stress test and echocardiogram in the morning    Physical exam:   Constitutional:  VITALS:  BP (!) 147/84   Pulse 91   Temp 98.1 °F (36.7 °C) (Oral)   Resp 16   Ht 1.753 m (5' 9\")   Wt 95.2 kg (209 lb 14.1 oz)   SpO2 98%   BMI 30.99 kg/m²   Gen: alert, awake  Neck: No JVD  Skin: Unremarkable  Cardiovascular:  S1, S2 without m/r/g   Respiratory: CTA B without w/r/r; on CPAP  Abdomen:  soft, nt, nd,   Extremities: no lower extremity edema  Neuro/Psy: AAoriented times 3 ; moves all 4 ext    Data/  Recent Labs     07/11/24  0806 07/12/24  0716 07/13/24  0733   WBC 7.2 6.4 7.3   HGB 8.5* 8.7* 8.4*   HCT 27.0* 27.6* 26.4*   MCV 83.7 84.6 84.4    271 281       Recent Labs     07/11/24  0806 07/12/24  0716 07/13/24  0733   * 136 131*   K 6.1* 5.0 5.6*   CL 93* 97* 96*   CO2 23 24 24   GLUCOSE 166* 153* 129*   PHOS 4.5  --   --    MG 2.10 1.90 1.90   BUN 79* 56* 75*   CREATININE 7.1* 5.6* 7.2*   LABGLOM 8* 11* 8*         Assessment  -ESRD on HD Tuesday Thursday Saturday at [facility]  -Hyperkalemia  -Anemia  -CKD/MBD  -CHF with reduced EF  -History of CAD  -Chest pain per cardiology   Angiogram with significant CAD, CT surgery has been consulted for possible CABG next week    Plan  -HD per TTS schedule   TW of 93 kg   -Retacrit 10K units w HD   -Renal diet  -Serial labs      Thank you for the consultation.  Please do not hesitate to call with questions.    Mrianda Neil MD  Office: [phone]  Fax:    [phone]  [place]

## 2024-07-13 NOTE — PROGRESS NOTES
07/13/24 0418   NIV Type   NIV Started/Stopped On   Equipment Type v60   Mode Bilevel   Mask Type Full face mask   Assessment   Respirations 21   SpO2 100 %   Comfort Level Good   Using Accessory Muscles No   Mask Compliance Good   Skin Assessment Clean, dry, & intact   Skin Protection for O2 Device N/A   Settings/Measurements   PIP Observed 13 cm H20   IPAP 12 cmH20   CPAP/EPAP 6 cmH2O   Vt (Measured) 413 mL   Rate Ordered 12   Insp Rise Time (%) 1 %   FiO2  35 %   I Time/ I Time % 0.9 s   Minute Volume (L/min) 9.2 Liters   Mask Leak (lpm) 30 lpm   Patient's Home Machine No   Alarm Settings   Alarms On Y   Low Pressure (cmH2O) 6 cmH2O   High Pressure (cmH2O) 30 cmH2O   Delay Alarm 20 sec(s)   Apnea (secs) 20 secs   RR Low (bpm) 6   RR High (bpm) 40 br/min

## 2024-07-13 NOTE — PROGRESS NOTES
07/13/24 0007   NIV Type   NIV Started/Stopped On   Equipment Type v60   Mode Bilevel   Mask Type Full face mask   Assessment   Respirations 21   SpO2 100 %   Comfort Level Good   Using Accessory Muscles No   Mask Compliance Good   Skin Assessment Clean, dry, & intact   Skin Protection for O2 Device N/A  (pt always declines meplilex)   Settings/Measurements   PIP Observed 12 cm H20   IPAP 12 cmH20   CPAP/EPAP 6 cmH2O   Vt (Measured) 609 mL   Rate Ordered 12   Insp Rise Time (%) 1 %   FiO2  40 %   I Time/ I Time % 0.9 s   Minute Volume (L/min) 12.9 Liters   Mask Leak (lpm) 2 lpm   Patient's Home Machine No   Alarm Settings   Alarms On Y   Low Pressure (cmH2O) 6 cmH2O   High Pressure (cmH2O) 30 cmH2O   Delay Alarm 20 sec(s)   RR Low (bpm) 6   RR High (bpm) 40 br/min

## 2024-07-14 LAB
ANION GAP SERPL CALCULATED.3IONS-SCNC: 8 MMOL/L (ref 3–16)
BUN SERPL-MCNC: 53 MG/DL (ref 7–20)
CALCIUM SERPL-MCNC: 9.4 MG/DL (ref 8.3–10.6)
CHLORIDE SERPL-SCNC: 98 MMOL/L (ref 99–110)
CO2 SERPL-SCNC: 26 MMOL/L (ref 21–32)
CREAT SERPL-MCNC: 5.7 MG/DL (ref 0.9–1.3)
DEPRECATED RDW RBC AUTO: 18 % (ref 12.4–15.4)
GFR SERPLBLD CREATININE-BSD FMLA CKD-EPI: 11 ML/MIN/{1.73_M2}
GLUCOSE BLD-MCNC: 132 MG/DL (ref 70–99)
GLUCOSE BLD-MCNC: 148 MG/DL (ref 70–99)
GLUCOSE BLD-MCNC: 187 MG/DL (ref 70–99)
GLUCOSE SERPL-MCNC: 100 MG/DL (ref 70–99)
HCT VFR BLD AUTO: 27.5 % (ref 40.5–52.5)
HGB BLD-MCNC: 8.8 G/DL (ref 13.5–17.5)
MAGNESIUM SERPL-MCNC: 1.9 MG/DL (ref 1.8–2.4)
MCH RBC QN AUTO: 26.9 PG (ref 26–34)
MCHC RBC AUTO-ENTMCNC: 31.9 G/DL (ref 31–36)
MCV RBC AUTO: 84.2 FL (ref 80–100)
PERFORMED ON: ABNORMAL
PLATELET # BLD AUTO: 285 K/UL (ref 135–450)
PMV BLD AUTO: 7.4 FL (ref 5–10.5)
POTASSIUM SERPL-SCNC: 5.2 MMOL/L (ref 3.5–5.1)
RBC # BLD AUTO: 3.26 M/UL (ref 4.2–5.9)
SODIUM SERPL-SCNC: 132 MMOL/L (ref 136–145)
WBC # BLD AUTO: 7.6 K/UL (ref 4–11)

## 2024-07-14 PROCEDURE — 85027 COMPLETE CBC AUTOMATED: CPT

## 2024-07-14 PROCEDURE — 2580000003 HC RX 258

## 2024-07-14 PROCEDURE — 94660 CPAP INITIATION&MGMT: CPT

## 2024-07-14 PROCEDURE — 2500000003 HC RX 250 WO HCPCS: Performed by: NURSE PRACTITIONER

## 2024-07-14 PROCEDURE — 6370000000 HC RX 637 (ALT 250 FOR IP): Performed by: NURSE PRACTITIONER

## 2024-07-14 PROCEDURE — 99232 SBSQ HOSP IP/OBS MODERATE 35: CPT | Performed by: INTERNAL MEDICINE

## 2024-07-14 PROCEDURE — 2060000000 HC ICU INTERMEDIATE R&B

## 2024-07-14 PROCEDURE — 80048 BASIC METABOLIC PNL TOTAL CA: CPT

## 2024-07-14 PROCEDURE — 94761 N-INVAS EAR/PLS OXIMETRY MLT: CPT

## 2024-07-14 PROCEDURE — 6370000000 HC RX 637 (ALT 250 FOR IP)

## 2024-07-14 PROCEDURE — 83735 ASSAY OF MAGNESIUM: CPT

## 2024-07-14 PROCEDURE — 2700000000 HC OXYGEN THERAPY PER DAY

## 2024-07-14 PROCEDURE — 36415 COLL VENOUS BLD VENIPUNCTURE: CPT

## 2024-07-14 PROCEDURE — 6370000000 HC RX 637 (ALT 250 FOR IP): Performed by: INTERNAL MEDICINE

## 2024-07-14 PROCEDURE — 6360000002 HC RX W HCPCS: Performed by: STUDENT IN AN ORGANIZED HEALTH CARE EDUCATION/TRAINING PROGRAM

## 2024-07-14 RX ADMIN — SACUBITRIL AND VALSARTAN 1 TABLET: 24; 26 TABLET, FILM COATED ORAL at 15:39

## 2024-07-14 RX ADMIN — CALCIUM ACETATE 1334 MG: 667 CAPSULE ORAL at 15:38

## 2024-07-14 RX ADMIN — SODIUM CHLORIDE, PRESERVATIVE FREE 10 ML: 5 INJECTION INTRAVENOUS at 15:42

## 2024-07-14 RX ADMIN — METOPROLOL SUCCINATE 25 MG: 25 TABLET, EXTENDED RELEASE ORAL at 15:39

## 2024-07-14 RX ADMIN — PREGABALIN 25 MG: 25 CAPSULE ORAL at 06:41

## 2024-07-14 RX ADMIN — PRAVASTATIN SODIUM 40 MG: 40 TABLET ORAL at 15:39

## 2024-07-14 RX ADMIN — SACUBITRIL AND VALSARTAN 1 TABLET: 24; 26 TABLET, FILM COATED ORAL at 20:31

## 2024-07-14 RX ADMIN — DULOXETINE HYDROCHLORIDE 60 MG: 60 CAPSULE, DELAYED RELEASE ORAL at 15:39

## 2024-07-14 RX ADMIN — BUSPIRONE HYDROCHLORIDE 10 MG: 5 TABLET ORAL at 20:31

## 2024-07-14 RX ADMIN — VENLAFAXINE HYDROCHLORIDE 75 MG: 37.5 CAPSULE, EXTENDED RELEASE ORAL at 15:38

## 2024-07-14 RX ADMIN — ASPIRIN 81 MG 81 MG: 81 TABLET ORAL at 15:38

## 2024-07-14 RX ADMIN — HEPARIN SODIUM 5000 UNITS: 5000 INJECTION INTRAVENOUS; SUBCUTANEOUS at 06:41

## 2024-07-14 RX ADMIN — DOCUSATE SODIUM 100 MG: 100 CAPSULE, LIQUID FILLED ORAL at 15:39

## 2024-07-14 RX ADMIN — TRAZODONE HYDROCHLORIDE 50 MG: 50 TABLET ORAL at 20:31

## 2024-07-14 RX ADMIN — HEPARIN SODIUM 5000 UNITS: 5000 INJECTION INTRAVENOUS; SUBCUTANEOUS at 20:33

## 2024-07-14 RX ADMIN — OXYCODONE 10 MG: 5 TABLET ORAL at 13:55

## 2024-07-14 RX ADMIN — QUETIAPINE FUMARATE 50 MG: 25 TABLET ORAL at 20:31

## 2024-07-14 RX ADMIN — SODIUM CHLORIDE, PRESERVATIVE FREE 10 ML: 5 INJECTION INTRAVENOUS at 20:33

## 2024-07-14 RX ADMIN — INSULIN GLARGINE 15 UNITS: 100 INJECTION, SOLUTION SUBCUTANEOUS at 20:33

## 2024-07-14 RX ADMIN — PANTOPRAZOLE SODIUM 40 MG: 40 TABLET, DELAYED RELEASE ORAL at 06:41

## 2024-07-14 RX ADMIN — PANTOPRAZOLE SODIUM 40 MG: 40 TABLET, DELAYED RELEASE ORAL at 16:57

## 2024-07-14 RX ADMIN — CALCIUM ACETATE 1334 MG: 667 CAPSULE ORAL at 20:31

## 2024-07-14 RX ADMIN — OXYCODONE 10 MG: 5 TABLET ORAL at 18:08

## 2024-07-14 RX ADMIN — BUSPIRONE HYDROCHLORIDE 10 MG: 5 TABLET ORAL at 15:39

## 2024-07-14 RX ADMIN — ISOSORBIDE MONONITRATE 30 MG: 30 TABLET, EXTENDED RELEASE ORAL at 15:40

## 2024-07-14 RX ADMIN — HEPARIN SODIUM 5000 UNITS: 5000 INJECTION INTRAVENOUS; SUBCUTANEOUS at 15:42

## 2024-07-14 ASSESSMENT — PAIN DESCRIPTION - LOCATION
LOCATION: BACK;FOOT
LOCATION: BACK;FOOT

## 2024-07-14 ASSESSMENT — PAIN SCALES - GENERAL
PAINLEVEL_OUTOF10: 10
PAINLEVEL_OUTOF10: 9
PAINLEVEL_OUTOF10: 8
PAINLEVEL_OUTOF10: 8
PAINLEVEL_OUTOF10: 0

## 2024-07-14 NOTE — PROGRESS NOTES
Pt continues to want to sleep. Still refusing medications. States he will take them later. Sabrina Barger RN

## 2024-07-14 NOTE — PLAN OF CARE
CHF Care Plan      Patient's EF (Ejection Fraction) is greater than 40%    Heart Failure Medications:  Diuretics:: None    (One of the following REQUIRED for EF </= 40%/SYSTOLIC FAILURE but MAY be used in EF% >40%/DIASTOLIC FAILURE)        ACE:: None        ARB:: None         ARNI:: Sacubitril/Valsartan-Entresto    (Beta Blockers)  NON- Evidenced Based Beta Blocker (for EF% >40%/DIASTOLIC FAILURE): None    Evidenced Based Beta Blocker::(REQUIRED for EF% <40%/SYSTOLIC FAILURE) Metoprolol SUCCinate- Toprol XL  ...................................................................................................................................................    Failed to redirect to the Timeline version of the [employer] SmartLink.      Patient's weights and intake/output reviewed    Daily Weight log at bedside, patient/family participation in use of log: \"yes    Patient's current weight today shows a difference of 21 lbs more than last documented weight. Inaccurate weight. Pt refusing standing weights      Intake/Output Summary (Last 24 hours) at 7/14/2024 1328  Last data filed at 7/14/2024 1205  Gross per 24 hour   Intake 0 ml   Output 0 ml   Net 0 ml       Education Booklet Provided: yes    Comorbidities Reviewed Yes    Patient has a past medical history of Ambulatory dysfunction, Aortic stenosis, Arthritis, Asthma, Bilateral hilar adenopathy syndrome, CAD (coronary artery disease), Cardiomyopathy (HCC), CHF (congestive heart failure) (HCC), Chronic pain, COPD (chronic obstructive pulmonary disease) (HCC), CVA (cerebral vascular accident) (HCC), Depression, Diabetes mellitus (HCC), Difficult intravenous access, Emphysema of lung (HCC), ESRD (end stage renal disease) on dialysis (HCC), Fear of needles, Gastric ulcer, GERD (gastroesophageal reflux disease), Heart valve problem, Hemodialysis patient (HCC), History of spinal fracture, History of transcatheter aortic valve replacement (TAVR), Hx of blood clots, Hyperlipidemia,

## 2024-07-14 NOTE — PROGRESS NOTES
07/14/24 0410   NIV Type   Equipment Type v60   Mode Bilevel   Mask Type Full face mask   Mask Size Medium   Assessment   Respirations 17   Comfort Level Good   Using Accessory Muscles No   Mask Compliance Good   Skin Protection for O2 Device No  (pt refused)   Settings/Measurements   IPAP 12 cmH20   CPAP/EPAP 6 cmH2O   Vt (Measured) 432 mL   Rate Ordered 12   Minute Volume (L/min) 7.3 Liters   Mask Leak (lpm) 28 lpm   Patient's Home Machine No   Alarm Settings   Alarms On Y   Low Pressure (cmH2O) 6 cmH2O   High Pressure (cmH2O) 30 cmH2O   Delay Alarm 20 sec(s)   RR Low (bpm) 6   RR High (bpm) 45 br/min

## 2024-07-14 NOTE — PROGRESS NOTES
V2.0    [facility] [place] Progress Note      Name:  Igor Ann /Age/Sex: 1970  (54 y.o. male)   MRN & CSN:  [id number] & [id number] Encounter Date/Time: 2024 9:14 AM EDT   Location:  044 PCP: Vonnie Cleveland APRN - NP     Attending:Naveed Munson MD       Hospital Day: 6    Assessment and Recommendations   Igor Ann is a 54 y.o. male with pmh of COPD, CAD status post 3 stents, ESRD on dialysis, GERD, heart failure, type 2 diabetes, and ZAINAB who presents with Coronary artery disease, unspecified vessel or lesion type, unspecified whether angina present, unspecified whether native or transplanted heart      Update: Patient seen at bedside this AM sleeping but awakens somewhat for conversation. No acute complaints today from patient. Denies CP. Is currently on BiPAP. Refused morning weight check and medications. Was eventually agreeable to vitals check around Noon. Recommended trialing off BiPAP and switching to previous NC that patient was given it was afternoon. Can hold on medications if patient refuses given normal vitals at this time. Recheck daily weight tomorrow.         Plan:   Chest pain/CAD status post 3 stents   -Ramus, left circumflex, right coronary artery stent placed in   -Troponins flat trajectory, EKG with T wave inversions new compared to previous  -Cardiology consulted  -Cath cath showed restenosis of the ramus, left circumflex, right coronary artery  -Patient received 3 additional stents  in the subsequent arteries listed above  -Continue aspirin 81  -Continue pravastatin 40 mg  -Holding Plavix by CT surgery anticipation for CABG  -Eliquis currently on hold  -CT surgery consulted and prepping for CABG   -Will repeat echo on July 15  -Plan unchanged    ESRD on dialysis  -Hemodialysis on ,  -Nephrology has been consulted    Heart failure reduced EF  -Previous echo showed 45 to 50%  -Continue Entresto  -Continue metoprolol  -CHF order  good.   Stress Test: A pharmacological stress test was performed using regadenoson (Lexiscan). 100 mg of aminophylline given as a reversal agent. The patient reported chest discomfort, dizziness and dyspnea during the stress test. Symptoms began during stress and ended during recovery. The patient reached the end of the protocol.   ECG: The stress ECG was not diagnostic due to resting ST-T abnormalities, failure to achieve target heart rate and pharmacologic protocol.     XR CHEST PORTABLE    Result Date: 7/3/2024  EXAMINATION: ONE XRAY VIEW OF THE CHEST 7/3/2024 4:38 pm COMPARISON: Chest x-ray dated 24 May 2024 HISTORY: ORDERING SYSTEM PROVIDED HISTORY: Chest Pain TECHNOLOGIST PROVIDED HISTORY: Reason for exam:->Chest Pain Reason for Exam: chest pain/SOB FINDINGS: Mild stable cardiomegaly.  Left-sided central venous catheter with the tip in the superior vena cava.  No pneumothorax.  Moderate left and small right pleural effusions with bibasilar airspace disease.     Moderate left and small right pleural effusions with bibasilar airspace disease.  This could represent a combination of pneumonia and atelectasis.       CBC:   Recent Labs     07/12/24  0716 07/13/24  0733 07/14/24  1047   WBC 6.4 7.3 7.6   HGB 8.7* 8.4* 8.8*    281 285     BMP:    Recent Labs     07/12/24  0716 07/13/24  0733 07/14/24  1047    131* 132*   K 5.0 5.6* 5.2*   CL 97* 96* 98*   CO2 24 24 26   BUN 56* 75* 53*   CREATININE 5.6* 7.2* 5.7*   GLUCOSE 153* 129* 100*     Hepatic:   No results for input(s): \"AST\", \"ALT\", \"BILITOT\", \"ALKPHOS\" in the last 72 hours.    Invalid input(s): \"ALB\"    Lipids:   Lab Results   Component Value Date/Time    CHOL 134 03/28/2024 11:40 AM    HDL 49 03/28/2024 11:40 AM    TRIG 95 03/28/2024 11:40 AM     Hemoglobin A1C:   Lab Results   Component Value Date/Time    LABA1C 6.0 07/04/2024 04:38 AM     TSH:   Lab Results   Component Value Date/Time    TSH 2.15 03/17/2022 08:33 PM    TSH 2.02 02/25/2022

## 2024-07-14 NOTE — PROGRESS NOTES
Right foot dressing change completed per instructions in Podiatry's note. Wound cleansed with saline, Xeroform applied to wound bed, covered with 4x4's, Kerlix and ace. Sabrina Barger RN

## 2024-07-14 NOTE — PROGRESS NOTES
Pt still sleeping. Awakened for vitals, which pt allowed. States that he is tired and does not want to be awakened again. Pt on BIPAP. Does not want morning medications yet. States he will call when he is ready to take his meds. Call light within reach. Sabrina Barger RN

## 2024-07-14 NOTE — PROGRESS NOTES
Pt awake, with visitor at bedside. Pt now agreeable to taking morning medication. Sabrina Barger RN

## 2024-07-14 NOTE — PROGRESS NOTES
[employer]  Nephrology Follow up Note           Reason for Consult: ESRD  Requesting Physician:  Dr. Lawrence    Sub/interval history    Resting, on bilevel  No changes overnight   No new complaints     ROS: No n/v, no edema   PSFH: No visitor    Scheduled Meds:   isosorbide mononitrate  30 mg Oral Daily    sodium chloride flush  5-40 mL IntraVENous 2 times per day    docusate sodium  100 mg Oral Daily    epoetin siddharth-epbx  10,000 Units IntraVENous Once per day on Tue Thu Sat    heparin (porcine)  5,000 Units SubCUTAneous 3 times per day    busPIRone  10 mg Oral TID    calcium acetate  2 capsule Oral TID    DULoxetine  60 mg Oral Daily    insulin glargine  15 Units SubCUTAneous Nightly    metoprolol succinate  25 mg Oral Daily    pravastatin  40 mg Oral Daily    pregabalin  25 mg Oral Daily    QUEtiapine  50 mg Oral Nightly    sacubitril-valsartan  1 tablet Oral BID    traZODone  50 mg Oral Daily    venlafaxine  75 mg Oral Daily    aspirin  81 mg Oral Daily    [Held by provider] clopidogrel  75 mg Oral Daily    pantoprazole  40 mg Oral BID AC    sodium chloride flush  5-40 mL IntraVENous 2 times per day    insulin lispro  0-4 Units SubCUTAneous TID WC    insulin lispro  0-4 Units SubCUTAneous Nightly     Continuous Infusions:   sodium chloride      sodium chloride      dextrose      sodium chloride       PRN Meds:.sodium chloride flush, sodium chloride, heparin (porcine), oxyCODONE, ipratropium 0.5 mg-albuterol 2.5 mg, sodium chloride flush, sodium chloride, glucose, dextrose bolus **OR** dextrose bolus, glucagon (rDNA), dextrose, sodium chloride, acetaminophen **OR** acetaminophen, polyethylene glycol, prochlorperazine    History of Present Illness on 7/4/2024:    54 y.o. yo male with PMH of ESRD, CAD status post PCI, CHF, COPD, HTN, DM, CVA and HTN, PAD status post iliac stents who is admitted for chest pain  Patient is over 5 kg from his target weight and nephrology has been asked to see him for  dialysis needs  Given his cardiac history, cardiology was consulted and they are planning on stress test and echocardiogram in the morning    Physical exam:   Constitutional:  VITALS:  /72   Pulse 69   Temp 97.8 °F (36.6 °C) (Axillary)   Resp 16   Ht 1.753 m (5' 9\")   Wt 104.6 kg (230 lb 9.6 oz)   SpO2 100%   BMI 34.05 kg/m²   Gen: alert, awake  Neck: No JVD  Skin: Unremarkable  Cardiovascular:  S1, S2 without m/r/g   Respiratory: CTA B without w/r/r; on CPAP  Abdomen:  soft, nt, nd,   Extremities: no lower extremity edema  Neuro/Psy: AAoriented times 3 ; moves all 4 ext    Data/  Recent Labs     07/12/24  0716 07/13/24 0733 07/14/24  1047   WBC 6.4 7.3 7.6   HGB 8.7* 8.4* 8.8*   HCT 27.6* 26.4* 27.5*   MCV 84.6 84.4 84.2    281 285       Recent Labs     07/12/24  0716 07/13/24  0733 07/14/24  1047    131* 132*   K 5.0 5.6* 5.2*   CL 97* 96* 98*   CO2 24 24 26   GLUCOSE 153* 129* 100*   MG 1.90 1.90 1.90   BUN 56* 75* 53*   CREATININE 5.6* 7.2* 5.7*   LABGLOM 11* 8* 11*         Assessment  -ESRD on HD Tuesday Thursday Saturday at [facility]  -Hyperkalemia  -Anemia  -CKD/MBD  -CHF with reduced EF  -History of CAD  -Chest pain per cardiology   Angiogram with significant CAD, CT surgery has been consulted and planning for CABG next week     Plan  -HD per TTS schedule   TW of 93 kg   -Retacrit 10K units w HD   -Renal diet  -Serial labs      Thank you for the consultation.  Please do not hesitate to call with questions.    Miranda Neil MD  Office: [phone]  Fax:    [phone]  [place]

## 2024-07-14 NOTE — PROGRESS NOTES
Pt taken off BIPAP and placed on 4 liters O2, per instructions from Resident. Pt annoyed when writer stated that the doctor would like him to wake up and take his medications. Pt stated \"tell the doctor he can kiss my ass. I can't get any sleep here. I haven't slept all night\". Pt still does not want to take medication, other than pain medication. Pt stated that he is going back to sleep. Writer notified Resident. Sabrina Barger RN

## 2024-07-14 NOTE — PROGRESS NOTES
Pt sleeping. Refuses vitals or meds until he wakes up. RN asked pt to call when he wakes up and is ready for his medications. Pt on BIPAP. Sabrina Barger RN

## 2024-07-14 NOTE — PLAN OF CARE
CHF Care Plan      Patient's EF (Ejection Fraction) is greater than 40%    Heart Failure Medications:  Diuretics:: None    (One of the following REQUIRED for EF </= 40%/SYSTOLIC FAILURE but MAY be used in EF% >40%/DIASTOLIC FAILURE)        ACE:: None        ARB:: None         ARNI:: Sacubitril/Valsartan-Entresto    (Beta Blockers)  NON- Evidenced Based Beta Blocker (for EF% >40%/DIASTOLIC FAILURE): None    Evidenced Based Beta Blocker::(REQUIRED for EF% <40%/SYSTOLIC FAILURE) Metoprolol SUCCinate- Toprol XL  ...................................................................................................................................................    Failed to redirect to the Timeline version of the [employer].      Patient's weights and intake/output reviewed    Daily Weight log at bedside, patient/family participation in use of log: \"yes          Intake/Output Summary (Last 24 hours) at 7/14/2024 0534  Last data filed at 7/13/2024 0651  Gross per 24 hour   Intake 60 ml   Output 25 ml   Net 35 ml       Education Booklet Provided: yes    Comorbidities Reviewed Yes    Patient has a past medical history of Ambulatory dysfunction, Aortic stenosis, Arthritis, Asthma, Bilateral hilar adenopathy syndrome, CAD (coronary artery disease), Cardiomyopathy ([facility]), CHF (congestive heart failure) ([facility]), Chronic pain, COPD (chronic obstructive pulmonary disease) ([facility]), CVA (cerebral vascular accident) ([facility]), Depression, Diabetes mellitus ([facility]), Difficult intravenous access, Emphysema of lung ([facility]), ESRD (end stage renal disease) on dialysis ([facility]), Fear of needles, Gastric ulcer, GERD (gastroesophageal reflux disease), Heart valve problem, Hemodialysis patient ([facility]), History of spinal fracture, History of transcatheter aortic valve replacement (TAVR), Hx of blood clots, Hyperlipidemia, Hypertension, MI (myocardial infarction) (HCC), Neuromuscular disorder (HCC), Numbness and tingling in left arm, Pneumonia, PONV

## 2024-07-14 NOTE — PLAN OF CARE
Problem: Safety - Adult  Goal: Free from fall injury  7/14/2024 1332 by Sabrina Barger RN  Outcome: Progressing  7/14/2024 0524 by Simona Hines RN  Outcome: Progressing     Problem: Chronic Conditions and Co-morbidities  Goal: Patient's chronic conditions and co-morbidity symptoms are monitored and maintained or improved  7/14/2024 1332 by Sabrina Barger RN  Outcome: Progressing  7/14/2024 0524 by Simona Hines RN  Outcome: Progressing     Problem: Discharge Planning  Goal: Discharge to home or other facility with appropriate resources  7/14/2024 1332 by Sabrina Barger RN  Outcome: Progressing  7/14/2024 0524 by Simona Hines RN  Outcome: Progressing     Problem: Skin/Tissue Integrity  Goal: Absence of new skin breakdown  Description: 1.  Monitor for areas of redness and/or skin breakdown  2.  Assess vascular access sites hourly  3.  Every 4-6 hours minimum:  Change oxygen saturation probe site  4.  Every 4-6 hours:  If on nasal continuous positive airway pressure, respiratory therapy assess nares and determine need for appliance change or resting period.  7/14/2024 1332 by Sabrina Barger RN  Outcome: Progressing  7/14/2024 0524 by Simona Hines RN  Outcome: Progressing     Problem: Pain  Goal: Verbalizes/displays adequate comfort level or baseline comfort level  7/14/2024 1332 by Sabrina Barger RN  Outcome: Progressing  7/14/2024 0524 by Simona Hines RN  Outcome: Progressing     Problem: Nutrition Deficit:  Goal: Optimize nutritional status  7/14/2024 1332 by Sabrina Barger RN  Outcome: Progressing  7/14/2024 0524 by Simona Hines RN  Outcome: Progressing

## 2024-07-15 ENCOUNTER — APPOINTMENT (OUTPATIENT)
Age: 54
DRG: 286 | End: 2024-07-15
Attending: INTERNAL MEDICINE
Payer: MEDICARE

## 2024-07-15 ENCOUNTER — APPOINTMENT (OUTPATIENT)
Dept: INTERVENTIONAL RADIOLOGY/VASCULAR | Age: 54
DRG: 286 | End: 2024-07-15
Attending: INTERNAL MEDICINE
Payer: MEDICARE

## 2024-07-15 ENCOUNTER — APPOINTMENT (OUTPATIENT)
Dept: GENERAL RADIOLOGY | Age: 54
DRG: 286 | End: 2024-07-15
Attending: INTERNAL MEDICINE
Payer: MEDICARE

## 2024-07-15 LAB
ANION GAP SERPL CALCULATED.3IONS-SCNC: 13 MMOL/L (ref 3–16)
BUN SERPL-MCNC: 67 MG/DL (ref 7–20)
CALCIUM SERPL-MCNC: 9.3 MG/DL (ref 8.3–10.6)
CHLORIDE SERPL-SCNC: 97 MMOL/L (ref 99–110)
CO2 SERPL-SCNC: 23 MMOL/L (ref 21–32)
CREAT SERPL-MCNC: 6.8 MG/DL (ref 0.9–1.3)
DEPRECATED RDW RBC AUTO: 18.1 % (ref 12.4–15.4)
GFR SERPLBLD CREATININE-BSD FMLA CKD-EPI: 9 ML/MIN/{1.73_M2}
GLUCOSE BLD-MCNC: 122 MG/DL (ref 70–99)
GLUCOSE BLD-MCNC: 140 MG/DL (ref 70–99)
GLUCOSE BLD-MCNC: 150 MG/DL (ref 70–99)
GLUCOSE BLD-MCNC: 180 MG/DL (ref 70–99)
GLUCOSE SERPL-MCNC: 124 MG/DL (ref 70–99)
HCT VFR BLD AUTO: 27.3 % (ref 40.5–52.5)
HGB BLD-MCNC: 8.5 G/DL (ref 13.5–17.5)
INR PPP: 1.06 (ref 0.85–1.15)
MCH RBC QN AUTO: 26.3 PG (ref 26–34)
MCHC RBC AUTO-ENTMCNC: 31 G/DL (ref 31–36)
MCV RBC AUTO: 84.9 FL (ref 80–100)
NT-PROBNP SERPL-MCNC: ABNORMAL PG/ML (ref 0–124)
PERFORMED ON: ABNORMAL
PLATELET # BLD AUTO: 293 K/UL (ref 135–450)
PMV BLD AUTO: 7.1 FL (ref 5–10.5)
POTASSIUM SERPL-SCNC: 5.9 MMOL/L (ref 3.5–5.1)
PROTHROMBIN TIME: 14 SEC (ref 11.9–14.9)
RBC # BLD AUTO: 3.21 M/UL (ref 4.2–5.9)
SODIUM SERPL-SCNC: 133 MMOL/L (ref 136–145)
WBC # BLD AUTO: 7 K/UL (ref 4–11)

## 2024-07-15 PROCEDURE — 94761 N-INVAS EAR/PLS OXIMETRY MLT: CPT

## 2024-07-15 PROCEDURE — 0W9B3ZZ DRAINAGE OF LEFT PLEURAL CAVITY, PERCUTANEOUS APPROACH: ICD-10-PCS | Performed by: INTERNAL MEDICINE

## 2024-07-15 PROCEDURE — 6370000000 HC RX 637 (ALT 250 FOR IP)

## 2024-07-15 PROCEDURE — 36415 COLL VENOUS BLD VENIPUNCTURE: CPT

## 2024-07-15 PROCEDURE — 6370000000 HC RX 637 (ALT 250 FOR IP): Performed by: INTERNAL MEDICINE

## 2024-07-15 PROCEDURE — 6360000002 HC RX W HCPCS: Performed by: STUDENT IN AN ORGANIZED HEALTH CARE EDUCATION/TRAINING PROGRAM

## 2024-07-15 PROCEDURE — 2060000000 HC ICU INTERMEDIATE R&B

## 2024-07-15 PROCEDURE — 71045 X-RAY EXAM CHEST 1 VIEW: CPT

## 2024-07-15 PROCEDURE — 2500000003 HC RX 250 WO HCPCS: Performed by: RADIOLOGY

## 2024-07-15 PROCEDURE — 2700000000 HC OXYGEN THERAPY PER DAY

## 2024-07-15 PROCEDURE — 6370000000 HC RX 637 (ALT 250 FOR IP): Performed by: NURSE PRACTITIONER

## 2024-07-15 PROCEDURE — 80048 BASIC METABOLIC PNL TOTAL CA: CPT

## 2024-07-15 PROCEDURE — 32555 ASPIRATE PLEURA W/ IMAGING: CPT

## 2024-07-15 PROCEDURE — 99232 SBSQ HOSP IP/OBS MODERATE 35: CPT | Performed by: INTERNAL MEDICINE

## 2024-07-15 PROCEDURE — 83880 ASSAY OF NATRIURETIC PEPTIDE: CPT

## 2024-07-15 PROCEDURE — 85027 COMPLETE CBC AUTOMATED: CPT

## 2024-07-15 PROCEDURE — 2500000003 HC RX 250 WO HCPCS: Performed by: NURSE PRACTITIONER

## 2024-07-15 PROCEDURE — 2580000003 HC RX 258

## 2024-07-15 PROCEDURE — 85610 PROTHROMBIN TIME: CPT

## 2024-07-15 PROCEDURE — 94660 CPAP INITIATION&MGMT: CPT

## 2024-07-15 RX ORDER — LIDOCAINE HYDROCHLORIDE 10 MG/ML
INJECTION, SOLUTION EPIDURAL; INFILTRATION; INTRACAUDAL; PERINEURAL PRN
Status: COMPLETED | OUTPATIENT
Start: 2024-07-15 | End: 2024-07-15

## 2024-07-15 RX ADMIN — HEPARIN SODIUM 5000 UNITS: 5000 INJECTION INTRAVENOUS; SUBCUTANEOUS at 20:31

## 2024-07-15 RX ADMIN — HEPARIN SODIUM 5000 UNITS: 5000 INJECTION INTRAVENOUS; SUBCUTANEOUS at 14:48

## 2024-07-15 RX ADMIN — LIDOCAINE HYDROCHLORIDE 10 ML: 10 INJECTION, SOLUTION EPIDURAL; INFILTRATION; INTRACAUDAL; PERINEURAL at 11:07

## 2024-07-15 RX ADMIN — CALCIUM ACETATE 1334 MG: 667 CAPSULE ORAL at 14:48

## 2024-07-15 RX ADMIN — ISOSORBIDE MONONITRATE 30 MG: 30 TABLET, EXTENDED RELEASE ORAL at 09:22

## 2024-07-15 RX ADMIN — METOPROLOL SUCCINATE 25 MG: 25 TABLET, EXTENDED RELEASE ORAL at 09:23

## 2024-07-15 RX ADMIN — PREGABALIN 25 MG: 25 CAPSULE ORAL at 06:52

## 2024-07-15 RX ADMIN — SODIUM ZIRCONIUM CYCLOSILICATE 10 G: 10 POWDER, FOR SUSPENSION ORAL at 11:55

## 2024-07-15 RX ADMIN — CALCIUM ACETATE 1334 MG: 667 CAPSULE ORAL at 20:30

## 2024-07-15 RX ADMIN — HEPARIN SODIUM 5000 UNITS: 5000 INJECTION INTRAVENOUS; SUBCUTANEOUS at 06:52

## 2024-07-15 RX ADMIN — QUETIAPINE FUMARATE 50 MG: 25 TABLET ORAL at 20:30

## 2024-07-15 RX ADMIN — VENLAFAXINE HYDROCHLORIDE 75 MG: 37.5 CAPSULE, EXTENDED RELEASE ORAL at 09:23

## 2024-07-15 RX ADMIN — PANTOPRAZOLE SODIUM 40 MG: 40 TABLET, DELAYED RELEASE ORAL at 14:48

## 2024-07-15 RX ADMIN — INSULIN GLARGINE 15 UNITS: 100 INJECTION, SOLUTION SUBCUTANEOUS at 20:31

## 2024-07-15 RX ADMIN — PANTOPRAZOLE SODIUM 40 MG: 40 TABLET, DELAYED RELEASE ORAL at 06:52

## 2024-07-15 RX ADMIN — DULOXETINE HYDROCHLORIDE 60 MG: 60 CAPSULE, DELAYED RELEASE ORAL at 09:23

## 2024-07-15 RX ADMIN — OXYCODONE 10 MG: 5 TABLET ORAL at 09:22

## 2024-07-15 RX ADMIN — BUSPIRONE HYDROCHLORIDE 10 MG: 5 TABLET ORAL at 20:30

## 2024-07-15 RX ADMIN — ASPIRIN 81 MG 81 MG: 81 TABLET ORAL at 09:23

## 2024-07-15 RX ADMIN — SODIUM CHLORIDE, PRESERVATIVE FREE 10 ML: 5 INJECTION INTRAVENOUS at 09:23

## 2024-07-15 RX ADMIN — OXYCODONE 10 MG: 5 TABLET ORAL at 14:48

## 2024-07-15 RX ADMIN — PRAVASTATIN SODIUM 40 MG: 40 TABLET ORAL at 09:22

## 2024-07-15 RX ADMIN — CALCIUM ACETATE 1334 MG: 667 CAPSULE ORAL at 09:23

## 2024-07-15 RX ADMIN — SODIUM CHLORIDE, PRESERVATIVE FREE 10 ML: 5 INJECTION INTRAVENOUS at 20:32

## 2024-07-15 RX ADMIN — OXYCODONE 10 MG: 5 TABLET ORAL at 20:30

## 2024-07-15 RX ADMIN — TRAZODONE HYDROCHLORIDE 50 MG: 50 TABLET ORAL at 20:30

## 2024-07-15 RX ADMIN — DOCUSATE SODIUM 100 MG: 100 CAPSULE, LIQUID FILLED ORAL at 09:23

## 2024-07-15 RX ADMIN — BUSPIRONE HYDROCHLORIDE 10 MG: 5 TABLET ORAL at 14:48

## 2024-07-15 RX ADMIN — BUSPIRONE HYDROCHLORIDE 10 MG: 5 TABLET ORAL at 09:23

## 2024-07-15 ASSESSMENT — PAIN DESCRIPTION - LOCATION
LOCATION: FOOT;HIP
LOCATION: RIB CAGE
LOCATION: FOOT

## 2024-07-15 ASSESSMENT — PAIN DESCRIPTION - ORIENTATION
ORIENTATION: LEFT;RIGHT
ORIENTATION: RIGHT

## 2024-07-15 ASSESSMENT — PAIN DESCRIPTION - DESCRIPTORS: DESCRIPTORS: SORE

## 2024-07-15 ASSESSMENT — PAIN SCALES - GENERAL
PAINLEVEL_OUTOF10: 7
PAINLEVEL_OUTOF10: 8
PAINLEVEL_OUTOF10: 3
PAINLEVEL_OUTOF10: 8

## 2024-07-15 NOTE — OR NURSING
Pt arrived for image guided Thoracentesis. Dr. Woo explained the procedure including the risk and benefits of the procedure. All questions answered. Pt verbalizes understanding of the procedure and states no more questions. Consent confirmed. Vital signs stable. Labs, allergies, medications, and code status reviewed. No contraindications noted. Time out completed prior to procedure.    Vital Signs  Vitals:    07/15/24 1048   BP: (!) 152/95   Pulse: 75   Resp: 18   Temp:    SpO2: 98%    (vital signs in table format)      Allergies  Morphine (allergies)    Labs  Lab Results   Component Value Date    INR 1.06 07/15/2024    PROTIME 14.0 07/15/2024     Lab Results   Component Value Date    CREATININE 6.8 (HH) 07/15/2024    BUN 67 (H) 07/15/2024     (L) 07/15/2024    K 5.9 (H) 07/15/2024    CL 97 (L) 07/15/2024    CO2 23 07/15/2024     Lab Results   Component Value Date    WBC 7.0 07/15/2024    HGB 8.5 (L) 07/15/2024    HCT 27.3 (L) 07/15/2024    MCV 84.9 07/15/2024     07/15/2024

## 2024-07-15 NOTE — OR NURSING
Image guided Thoracentesis completed.  0 liters withdrawn.  Pt tolerated procedure without any signs or symptoms of distress. Vital signs stable.     DISCHARGED:  ADMITTED: Pt transferred back to room 440. Report called to nurse.     SPECIMEN SENT:  No    Vital Signs  Vitals:    07/15/24 1048   BP: (!) 152/95   Pulse: 75   Resp: 18   Temp:    SpO2: 98%    (vital signs in table format)

## 2024-07-15 NOTE — PROGRESS NOTES
07/14/24 2136   NIV Type   NIV Started/Stopped On   Equipment Type v60   Mode Bilevel   Mask Type Full face mask   Mask Size Medium   Settings/Measurements   PIP Observed 13 cm H20   IPAP 12 cmH20   CPAP/EPAP 6 cmH2O   Vt (Measured) 536 mL   Rate Ordered 12   FiO2  35 %   Minute Volume (L/min) 11.3 Liters   Mask Leak (lpm) 2 lpm   Patient's Home Machine No   Alarm Settings   Alarms On Y   Low Pressure (cmH2O) 6 cmH2O   High Pressure (cmH2O) 30 cmH2O   Delay Alarm 20 sec(s)   Apnea (secs) 20 secs   RR Low (bpm) 6   RR High (bpm) 45 br/min

## 2024-07-15 NOTE — PROGRESS NOTES
[facility] Progress Note           Subjective/   54 y.o. year old male who we are seeing in consultation for ESKD on HD TTS.     HPI:  Last HD on 7/13 with 3.3 liters removed, post-weight of 95.2 kg.  Denies any shortness of breath, on 4 L NC which he uses at home.    ROS:  No chest pain, +weak.    Objective/   GEN:  Chronically ill, /75   Pulse 66   Temp 98 °F (36.7 °C) (Oral)   Resp 18   Ht 1.753 m (5' 9\")   Wt 106.9 kg (235 lb 10.8 oz)   SpO2 100%   BMI 34.80 kg/m²   HEENT: non-icteric, no JVD  CV: S1, S2 without m/r/g; no LE edema  RESP: CTA B without w/r/r; breathing wnl  ABD: +bs, soft, nt, no hsm  SKIN: warm, no rashes  ACCESS: Left IJ TDC    Data/  Recent Labs     07/13/24  0733 07/14/24  1047 07/15/24  0928   WBC 7.3 7.6 7.0   HGB 8.4* 8.8* 8.5*   HCT 26.4* 27.5* 27.3*   MCV 84.4 84.2 84.9    285 293     Recent Labs     07/13/24  0733 07/14/24  1047 07/15/24  0928   * 132* 133*   K 5.6* 5.2* 5.9*   CL 96* 98* 97*   CO2 24 26 23   GLUCOSE 129* 100* 124*   MG 1.90 1.90  --    BUN 75* 53* 67*   CREATININE 7.2* 5.7* 6.8*   LABGLOM 8* 11* 9*       Assessment/     - [facility]    -Hyperkalemia    -Anemia    -CHF with reduced EF with bilateral pleural effusions   Plans for thoracentesis    -CAD/Chest pain                Angiogram with significant CAD, CT surgery has been consulted and planning for CABG      Plan/     - HD per TTS schedule, EDW 93 kg  - Lokelma 10 grams X1  - Retacrit 10K qHD  - Trend labs, bp's, weights    ____________________________________  Ricky Carlos MD  [facility]  [email or web address]  Office: [phone]

## 2024-07-15 NOTE — PROGRESS NOTES
07/15/24 0346   NIV Type   $NIV $Daily Charge   NIV Started/Stopped On   Equipment Type v60   Mode Bilevel   Mask Type Full face mask   Mask Size Medium   Assessment   Respirations 16   Settings/Measurements   IPAP 12 cmH20   CPAP/EPAP 6 cmH2O   Vt (Measured) 501 mL   Rate Ordered 12   FiO2  35 %   Patient's Home Machine No   Alarm Settings   Alarms On Y   Low Pressure (cmH2O) 6 cmH2O   High Pressure (cmH2O) 30 cmH2O

## 2024-07-15 NOTE — CARE COORDINATION
Case Management Assessment  Initial Evaluation    Date/Time of Evaluation: 7/15/2024 4:04 PM  Assessment Completed by: NINO Urias    If patient is discharged prior to next notation, then this note serves as note for discharge by case management.    Patient Name: Igor Ann                   YOB: 1970  Diagnosis: Coronary artery disease, unspecified vessel or lesion type, unspecified whether angina present, unspecified whether native or transplanted heart [I25.10]                   Date / Time: 7/9/2024 10:59 PM    Patient Admission Status: Inpatient   Readmission Risk (Low < 19, Mod (19-27), High > 27): Readmission Risk Score: 44    Current PCP: Vonnie Cleveland APRN - NP  PCP verified by CM? Yes    Chart Reviewed: Yes      History Provided by: Patient, Spouse  Patient Orientation: Alert and Oriented    Patient Cognition: Alert    Hospitalization in the last 30 days (Readmission):  Yes    If yes, Readmission Assessment in CM Navigator will be completed.    Advance Directives:      Code Status: Full Code   Patient's Primary Decision Maker is: Named in Scanned ACP Document    Primary Decision Maker: Joya Land - Step Child - [phone]    Secondary Decision Maker: Crystal Ann - Spouse - [phone]    Secondary Decision Maker: Sridevi Salmeron - Brother/Sister - [phone]    Supplemental (Other) Decision Maker: AmyBrianna - Step Child - [phone]    Discharge Planning:    Patient lives with: Other (Comment) (facility) Type of Home: Long-Term Care  Primary Care Giver: Other (Comment)  Patient Support Systems include: Spouse/Significant Other, Other (Comment)   Current Financial resources: Medicaid  Current community resources: ECF/Home Care  Current services prior to admission: Long Term Acute Care            Current DME:              Type of Home Care services:  None    ADLS  Prior functional level: Assistance with the following:, Cooking, Housework, Mobility,

## 2024-07-15 NOTE — PROGRESS NOTES
[facility]   Daily Cardiovascular Progress Note    Admit Date: 7/9/2024    Chief complaint: sob  HPI:     Pt denies CP, SOB, dizziness or syncope.  Feels tired, indicates that his breathing has improved.      Medications/Labs all Reviewed:  Patient Active Problem List   Diagnosis    Sepsis without acute organ dysfunction ([facility])    CHF (congestive heart failure) ([facility])    Chronic obstructive pulmonary disease ([facility])    Coronary artery disease involving native coronary artery of native heart    PVD (peripheral vascular disease) ([facility])    Bicuspid aortic valve    Bilateral hilar adenopathy syndrome    Claudication in peripheral vascular disease ([facility])    Hypertension    Diabetic neuropathy ([facility])    Type 2 DM with hypertension and ESRD on dialysis ([facility])    Passive smoke exposure    Depression with anxiety    Community acquired pneumonia of left lower lobe of lung    Obesity    ZAINAB on CPAP    Degeneration of lumbar or lumbosacral intervertebral disc    Lumbar radiculopathy    Lumbosacral spondylosis without myelopathy    Biliary colic    Symptomatic cholelithiasis    Gastroparesis due to DM ([facility])    Elevated troponin    Angina, class IV ([facility])    Dyspnea    Dyslipidemia    Chronic systolic CHF (congestive heart failure) ([facility])    Ischemic cardiomyopathy    Tobacco abuse    CVA (cerebral vascular accident) ([facility])    History of CVA (cerebrovascular accident)    Type 2 diabetes mellitus without complication, without long-term current use of insulin ([facility])    ZAINAB treated with BiPAP    Pleural effusion    Chronic anemia    Nonrheumatic aortic valve stenosis    Mucus plugging of bronchi    Hemodialysis-associated hypotension    ESRD on dialysis ([facility])    Hypotension due to drugs    Acute diastolic CHF (congestive heart failure) ([facility])    Neuromuscular disorder ([facility])    Renovascular hypertension    Mixed hyperlipidemia    Cigarette nicotine dependence in remission    Pulmonary edema    Fluid overload    Anemia of chronic  respiratory failure with hypoxemia (HCC)    Hyperkalemia    Hyponatremia    Metabolic acidemia    Pulmonary infiltrate    Leukocytosis    Hypertensive urgency    Severe sepsis (HCC)    Positive blood culture    Chest heaviness    Abnormal cardiovascular stress test    Ulcer with gangrene, with unspecified severity (HCC)    Symptomatic bradycardia    Pulmonary HTN (HCC)    Aortic valve disease    Cardiogenic shock (HCC)    Gangrene of right foot (HCC)    Wound infection    ESRD (end stage renal disease) (HCC)    Acute respiratory failure with hypoxia (HCC)    Volume overload    Sepsis (HCC)    Bimalleolar fracture of right ankle, closed, initial encounter    High anion gap metabolic acidosis    Acute on chronic left systolic heart failure (HCC)    Anxiety    Acute on chronic respiratory failure (HCC)    Non-compliance    Diabetic ketoacidosis without coma associated with diabetes mellitus due to underlying condition (HCC)    Abnormal nuclear cardiac imaging test    ZAINAB (obstructive sleep apnea)    Acute systolic CHF (congestive heart failure) (HCC)    Foot ulcer with necrosis of bone, left (HCC)    Right foot ulcer, with necrosis of bone (HCC)    Foot infection    Chest pain    Dark emesis    Coronary artery disease, unspecified vessel or lesion type, unspecified whether angina present, unspecified whether native or transplanted heart    Abnormal stress test    CAD (coronary artery disease)       Medications:  isosorbide mononitrate (IMDUR) extended release tablet 30 mg, Daily  sodium chloride flush 0.9 % injection 5-40 mL, 2 times per day  sodium chloride flush 0.9 % injection 5-40 mL, PRN  0.9 % sodium chloride infusion, PRN  docusate sodium (COLACE) capsule 100 mg, Daily  heparin (porcine) injection 3,600 Units, PRN  epoetin siddharth-epbx (RETACRIT) injection 10,000 Units, Once per day on Tue Thu Sat  heparin (porcine) injection 5,000 Units, 3 times per day  oxyCODONE (ROXICODONE) immediate release tablet 10 mg, Q4H  congestion    Hypercholesteremia    Bradycardia    S/P TAVR (transcatheter aortic valve replacement)    Syncope and collapse    PAF (paroxysmal atrial fibrillation) ([facility])    Bilateral leg weakness    GBS (Guillain-[place] syndrome) ([facility])    Sinus pause    Weakness of both lower extremities    Sinus bradycardia    Ataxia    Peripheral vascular occlusive disease ([facility])    Cellulitis of right foot    Iliac artery occlusion, right ([facility])    Cellulitis and abscess of hand    Uncontrolled type 2 diabetes mellitus with hyperglycemia ([facility])    Acute encephalopathy    Acute hypoxemic respiratory failure ([facility])    Acute CVA (cerebrovascular accident) ([facility])    Speech problem    Urinary tract infection with hematuria    Respiratory failure with hypoxia ([facility])    Acute respiratory failure with hypercapnia ([facility])    Acute pulmonary edema ([facility])    Grade II diastolic dysfunction    Shock circulatory ([facility])    Smoker    Normocytic normochromic anemia    NSTEMI (non-ST elevated myocardial infarction) ([facility])    SIRS (systemic inflammatory response syndrome) ([facility])    Atrial flutter ([facility])    Liberty-rectal abscess    Somnolence    Pulmonary edema with diastolic CHF, NYHA class 3 ([facility])    Respiratory failure ([facility])    Former smoker    Cardiomyopathy ([facility])    Morbid obesity with BMI of 40.0-44.9, adult ([facility])    Hypoglycemia    Altered mental status    Hypertensive emergency    Dyspnea and respiratory abnormalities    Acute respiratory failure with hypoxia and hypercapnia ([facility])    HFrEF (heart failure with reduced ejection fraction) ([facility])    Pericardial effusion    Hypervolemia    Pneumonia of left lower lobe due to infectious organism    Acute on chronic respiratory failure with hypercapnia ([facility])    Compression atelectasis    Type 2 myocardial infarction ([facility])    Acute respiratory failure with hypoxemia ([facility])    Hyperkalemia    Hyponatremia    Metabolic acidemia    Pulmonary infiltrate    Leukocytosis    Hypertensive urgency    Severe sepsis ([facility])

## 2024-07-15 NOTE — PROGRESS NOTES
CVTS Thoracic Progress Note:          CC:  Pre- op follow up    Subj: Pt resting in bed, comfortable.     Obj:    Blood pressure 138/75, pulse 66, temperature 98 °F (36.7 °C), temperature source Oral, resp. rate 18, height 1.753 m (5' 9\"), weight 106.9 kg (235 lb 10.8 oz), SpO2 100 %.    Lungs CTAB   Cardiac S1S2 no M/R/G   Abdomen Soft and non-tender     Diagnostics:   PT/INR:    Lab Results   Component Value Date/Time    PROTIME 17.4 07/03/2024 04:44 PM    INR 1.40 07/03/2024 04:44 PM     Troponin:    Lab Results   Component Value Date/Time    TROPONINI 0.11 04/18/2023 01:27 AM     Recent Labs     07/13/24  0733 07/14/24  1047 07/15/24  0928   WBC 7.3 7.6 7.0   HGB 8.4* 8.8* 8.5*   HCT 26.4* 27.5* 27.3*    285 293                                                                    Recent Labs     07/13/24  0733 07/14/24  1047   * 132*   K 5.6* 5.2*   CL 96* 98*   CO2 24 26   BUN 75* 53*   CREATININE 7.2* 5.7*   GLUCOSE 129* 100*              Recent Labs     07/13/24  0733 07/14/24  1047   MG 1.90 1.90          Assess/Plan: MV CAD.   -Care per primary team.     -Repeat ECHO on Monday 7/15.  -HD on Tuesday 7/14.  -Pre-op testing ordered, and reviewed.  -Hold Plavix.  -Last dose of Eliquis 7/5.  -Hold Entresto 48hr prior to surgery.  -Medically need to optimize pt prior to surgery.   -Tentative plan for CABG x 3-4 on Wednesday 7/17, possible Impella placement.       JAY Charles - CNP  7/15/2024  9:41 AM

## 2024-07-15 NOTE — PROGRESS NOTES
V2.0    [facility] [place] Progress Note      Name:  Igor Ann /Age/Sex: 1970  (54 y.o. male)   MRN & CSN:  [id number] & [id number] Encounter Date/Time: 7/15/2024 9:14 AM EDT   Location:  [id number] PCP: Vonnie Cleveland APRN - NP     Attending:Ho Neff DO       Hospital Day: 7    Assessment and Recommendations   Igor Ann is a 54 y.o. male with pmh of COPD, CAD status post 3 stents, ESRD on dialysis, GERD, heart failure, type 2 diabetes, and ZAINAB who presents with Coronary artery disease, unspecified vessel or lesion type, unspecified whether angina present, unspecified whether native or transplanted heart        Plan:   Chest pain/CAD status post 3 stents   -Ramus, left circumflex, right coronary artery stent placed in   -Troponins flat trajectory, EKG with T wave inversions new compared to previous  -Cardiology consulted  -Cath cath showed restenosis of the ramus, left circumflex, right coronary artery  -Patient received 3 additional stents  in the subsequent arteries listed above  -Continue aspirin 81  -Continue pravastatin 40 mg  -Holding Plavix by CT surgery anticipation for CABG  -Eliquis currently on hold  -CT surgery consulted and prepping for CABG   -Will repeat echo on July 15  -Plan unchanged since previous day    ESRD on dialysis  -Hemodialysis on ,  -Nephrology has been consulted    Heart failure reduced EF  -Previous echo showed 45 to 50%  -Continue Entresto  -Continue metoprolol  -CHF order set in place  -Echo on Monday    Type 2 diabetes  Continue 15 units nightly  Low-dose sliding scale    GERD  -Possible hematemesis  -GI consulted  -EGD negative  -Will continue pantoprazole 40 mg twice daily    Chronic normocytic anemia  Continue monitor CBC  No signs of bleeding, holding Eliquis  Secondary to CKD  Patient gets Retacrit    Chronic osteomyelitis  Status post foot I&D with MT bone resection  Patient finished antibiotics and was following with  time.    Review of Systems:      Pertinent positives and negatives discussed in HPI      Objective:     Intake/Output Summary (Last 24 hours) at 7/15/2024 1305  Last data filed at 7/15/2024 0915  Gross per 24 hour   Intake --   Output 50 ml   Net -50 ml      Vitals:   Vitals:    07/15/24 0915 07/15/24 1048 07/15/24 1109 07/15/24 1130   BP: 138/75 (!) 152/95 (!) 167/84    Pulse: 66 75 69    Resp: 18 18 18 18   Temp: 98 °F (36.7 °C)      TempSrc: Oral      SpO2: 100% 98% 100%    Weight:       Height:             Physical Exam:      General appearance: No apparent distress, appears stated age and cooperative.  HEENT: Pupils equal, round, and reactive to light. Conjunctivae/corneas clear.  Neck: Supple, with full range of motion. No jugular venous distention. Trachea midline.  Respiratory:  Normal respiratory effort. Clear to auscultation, bilaterally without Rales/Wheezes/Rhonchi.  Cardiovascular: Regular rate and rhythm without murmurs, rubs or gallops.  Abdomen: Soft, non-tender, non-distended with normal bowel sounds.  Musculoskeletal: No clubbing, cyanosis or edema bilaterally.  Full range of motion without deformity.  Skin: Skin color, texture, turgor normal.  No rashes or lesions.  Neurologic:  Neurovascularly intact without any focal sensory/motor deficits.  Psychiatric: Alert and oriented, thought content appropriate, normal insight  Capillary Refill: Brisk, 2 seconds, normal       Medications:   Medications:    isosorbide mononitrate  30 mg Oral Daily    sodium chloride flush  5-40 mL IntraVENous 2 times per day    docusate sodium  100 mg Oral Daily    epoetin siddharth-epbx  10,000 Units IntraVENous Once per day on Tue Thu Sat    heparin (porcine)  5,000 Units SubCUTAneous 3 times per day    busPIRone  10 mg Oral TID    calcium acetate  2 capsule Oral TID    DULoxetine  60 mg Oral Daily    insulin glargine  15 Units SubCUTAneous Nightly    metoprolol succinate  25 mg Oral Daily    pravastatin  40 mg Oral Daily

## 2024-07-15 NOTE — PROCEDURES
[address] [address]                           PULMONARY FUNCTION      PATIENT NAME: ALLEN HARDWICK            : 1970  MED REC NO: [id number]                      ROOM: 0440  ACCOUNT NO: [id number]                       ADMIT DATE: 2024  PROVIDER: Jt Esquivel MD      DATE OF PROCEDURE: 2024    INTERPRETATION:  Patient is a 54-year-old male who underwent a PFT for coronary artery disease.    Patient's spirometry shows FVC to be 29%, FEV1 to be 23%, FEV1 to FVC ratio was 82%. FEF25% to 75% was 14%.  On the basis of PFT, patient has very severe obstructive airway disease.  Along with that patient also has decreased FVC and if clinically warranted, patient needs to have a complete PFT.  Please correlate clinically.          JT ESQUIVEL MD      D:  2024 17:56:30     T:  2024 04:03:45     SK/CHELSEA  Job #:  [id number]     Doc#:  [id number]

## 2024-07-15 NOTE — PLAN OF CARE
CHF Care Plan      Patient's EF (Ejection Fraction) is greater than 40%    Heart Failure Medications:  Diuretics:: None    (One of the following REQUIRED for EF </= 40%/SYSTOLIC FAILURE but MAY be used in EF% >40%/DIASTOLIC FAILURE)        ACE:: None        ARB:: None         ARNI:: Sacubitril/Valsartan-Entresto    (Beta Blockers)  NON- Evidenced Based Beta Blocker (for EF% >40%/DIASTOLIC FAILURE): None    Evidenced Based Beta Blocker::(REQUIRED for EF% <40%/SYSTOLIC FAILURE) Metoprolol SUCCinate- Toprol XL  ...................................................................................................................................................    Failed to redirect to the Timeline version of the [employer] SmartLink.      Patient's weights and intake/output reviewed    Daily Weight log at bedside, patient/family participation in use of log:  yes          Intake/Output Summary (Last 24 hours) at 7/15/2024 0739  Last data filed at 7/14/2024 1205  Gross per 24 hour   Intake 0 ml   Output 0 ml   Net 0 ml       Education Booklet Provided: yes    Comorbidities Reviewed Yes    Patient has a past medical history of Ambulatory dysfunction, Aortic stenosis, Arthritis, Asthma, Bilateral hilar adenopathy syndrome, CAD (coronary artery disease), Cardiomyopathy ([facility]), CHF (congestive heart failure) ([facility]), Chronic pain, COPD (chronic obstructive pulmonary disease) ([facility]), CVA (cerebral vascular accident) ([facility]), Depression, Diabetes mellitus ([facility]), Difficult intravenous access, Emphysema of lung ([facility]), ESRD (end stage renal disease) on dialysis ([facility]), Fear of needles, Gastric ulcer, GERD (gastroesophageal reflux disease), Heart valve problem, Hemodialysis patient ([facility]), History of spinal fracture, History of transcatheter aortic valve replacement (TAVR), Hx of blood clots, Hyperlipidemia, Hypertension, MI (myocardial infarction) (HCC), Neuromuscular disorder (HCC), Numbness and tingling in left arm, Pneumonia, PONV

## 2024-07-15 NOTE — PROGRESS NOTES
07/14/24 3794   NIV Type   NIV Started/Stopped On   Equipment Type v60   Mode Bilevel   Mask Type Full face mask   Mask Size Medium   Assessment   Respirations 16   Settings/Measurements   IPAP 12 cmH20   CPAP/EPAP 6 cmH2O   Vt (Measured) 492 mL   Rate Ordered 12   FiO2  35 %   Mask Leak (lpm) 10 lpm   Patient's Home Machine No   Alarm Settings   Alarms On Y   Low Pressure (cmH2O) 6 cmH2O   High Pressure (cmH2O) 30 cmH2O

## 2024-07-16 ENCOUNTER — APPOINTMENT (OUTPATIENT)
Age: 54
DRG: 286 | End: 2024-07-16
Attending: INTERNAL MEDICINE
Payer: MEDICARE

## 2024-07-16 LAB
ABO + RH BLD: NORMAL
ALBUMIN SERPL-MCNC: 3 G/DL (ref 3.4–5)
ANION GAP SERPL CALCULATED.3IONS-SCNC: 11 MMOL/L (ref 3–16)
BLD GP AB SCN SERPL QL: NORMAL
BUN SERPL-MCNC: 82 MG/DL (ref 7–20)
CALCIUM SERPL-MCNC: 9.4 MG/DL (ref 8.3–10.6)
CHLORIDE SERPL-SCNC: 99 MMOL/L (ref 99–110)
CO2 SERPL-SCNC: 24 MMOL/L (ref 21–32)
CREAT SERPL-MCNC: 8.1 MG/DL (ref 0.9–1.3)
DEPRECATED RDW RBC AUTO: 17.9 % (ref 12.4–15.4)
ECHO AO ASC DIAM: 3.5 CM
ECHO AO ASCENDING AORTA INDEX: 1.58 CM/M2
ECHO AO ROOT DIAM: 3.7 CM
ECHO AO ROOT INDEX: 1.67 CM/M2
ECHO AV AREA PEAK VELOCITY: 2.8 CM2
ECHO AV AREA VTI: 3.3 CM2
ECHO AV AREA/BSA PEAK VELOCITY: 1.3 CM2/M2
ECHO AV AREA/BSA VTI: 1.5 CM2/M2
ECHO AV MEAN GRADIENT: 3 MMHG
ECHO AV MEAN VELOCITY: 0.8 M/S
ECHO AV PEAK GRADIENT: 7 MMHG
ECHO AV PEAK VELOCITY: 1.3 M/S
ECHO AV VELOCITY RATIO: 0.69
ECHO AV VTI: 20.6 CM
ECHO EST RA PRESSURE: 8 MMHG
ECHO LA AREA 2C: 27.8 CM2
ECHO LA AREA 4C: 30.1 CM2
ECHO LA DIAMETER INDEX: 2.17 CM/M2
ECHO LA DIAMETER: 4.8 CM
ECHO LA MAJOR AXIS: 6.4 CM
ECHO LA MINOR AXIS: 6.2 CM
ECHO LA TO AORTIC ROOT RATIO: 1.3
ECHO LA VOL BP: 101 ML (ref 18–58)
ECHO LA VOL MOD A2C: 99 ML (ref 18–58)
ECHO LA VOL MOD A4C: 100 ML (ref 18–58)
ECHO LA VOL/BSA BIPLANE: 46 ML/M2 (ref 16–34)
ECHO LA VOLUME INDEX MOD A2C: 45 ML/M2 (ref 16–34)
ECHO LA VOLUME INDEX MOD A4C: 45 ML/M2 (ref 16–34)
ECHO LV E' LATERAL VELOCITY: 6 CM/S
ECHO LV E' SEPTAL VELOCITY: 4 CM/S
ECHO LV EDV A2C: 216 ML
ECHO LV EDV A4C: 188 ML
ECHO LV EDV BP: 207 ML (ref 67–155)
ECHO LV EDV INDEX A4C: 85 ML/M2
ECHO LV EDV INDEX BP: 94 ML/M2
ECHO LV EDV NDEX A2C: 98 ML/M2
ECHO LV EJECTION FRACTION A2C: 38 %
ECHO LV EJECTION FRACTION A4C: 48 %
ECHO LV EJECTION FRACTION BIPLANE: 41 % (ref 55–100)
ECHO LV ESV A2C: 135 ML
ECHO LV ESV A4C: 98 ML
ECHO LV ESV BP: 122 ML (ref 22–58)
ECHO LV ESV INDEX A2C: 61 ML/M2
ECHO LV ESV INDEX A4C: 44 ML/M2
ECHO LV ESV INDEX BP: 55 ML/M2
ECHO LV FRACTIONAL SHORTENING: 14 % (ref 28–44)
ECHO LV INTERNAL DIMENSION DIASTOLE INDEX: 2.62 CM/M2
ECHO LV INTERNAL DIMENSION DIASTOLIC: 5.8 CM (ref 4.2–5.9)
ECHO LV INTERNAL DIMENSION SYSTOLIC INDEX: 2.26 CM/M2
ECHO LV INTERNAL DIMENSION SYSTOLIC: 5 CM
ECHO LV ISOVOLUMETRIC RELAXATION TIME (IVRT): 74 MS
ECHO LV IVSD: 1.2 CM (ref 0.6–1)
ECHO LV MASS 2D: 297 G (ref 88–224)
ECHO LV MASS INDEX 2D: 134.4 G/M2 (ref 49–115)
ECHO LV POSTERIOR WALL DIASTOLIC: 1.2 CM (ref 0.6–1)
ECHO LV RELATIVE WALL THICKNESS RATIO: 0.41
ECHO LVOT AREA: 4.2 CM2
ECHO LVOT AV VTI INDEX: 0.8
ECHO LVOT DIAM: 2.3 CM
ECHO LVOT MEAN GRADIENT: 1 MMHG
ECHO LVOT PEAK GRADIENT: 3 MMHG
ECHO LVOT PEAK VELOCITY: 0.9 M/S
ECHO LVOT STROKE VOLUME INDEX: 30.8 ML/M2
ECHO LVOT SV: 68.1 ML
ECHO LVOT VTI: 16.4 CM
ECHO MV A VELOCITY: 0.9 M/S
ECHO MV AREA VTI: 2.2 CM2
ECHO MV E DECELERATION TIME (DT): 169 MS
ECHO MV E VELOCITY: 1.14 M/S
ECHO MV E/A RATIO: 1.27
ECHO MV E/E' LATERAL: 19
ECHO MV E/E' RATIO (AVERAGED): 23.75
ECHO MV E/E' SEPTAL: 28.5
ECHO MV LVOT VTI INDEX: 1.91
ECHO MV MAX VELOCITY: 1.2 M/S
ECHO MV MEAN GRADIENT: 3 MMHG
ECHO MV MEAN VELOCITY: 0.8 M/S
ECHO MV PEAK GRADIENT: 6 MMHG
ECHO MV VTI: 31.4 CM
ECHO RA AREA 4C: 21.1 CM2
ECHO RA END SYSTOLIC VOLUME APICAL 4 CHAMBER INDEX BSA: 28 ML/M2
ECHO RA VOLUME: 62 ML
ECHO RV FREE WALL PEAK S': 10 CM/S
ECHO RV INTERNAL DIMENSION: 4.3 CM
ECHO RV TAPSE: 2 CM (ref 1.7–?)
GFR SERPLBLD CREATININE-BSD FMLA CKD-EPI: 7 ML/MIN/{1.73_M2}
GLUCOSE BLD-MCNC: 114 MG/DL (ref 70–99)
GLUCOSE BLD-MCNC: 199 MG/DL (ref 70–99)
GLUCOSE BLD-MCNC: 228 MG/DL (ref 70–99)
GLUCOSE SERPL-MCNC: 142 MG/DL (ref 70–99)
HCT VFR BLD AUTO: 26.6 % (ref 40.5–52.5)
HGB BLD-MCNC: 8.5 G/DL (ref 13.5–17.5)
MCH RBC QN AUTO: 27.1 PG (ref 26–34)
MCHC RBC AUTO-ENTMCNC: 31.7 G/DL (ref 31–36)
MCV RBC AUTO: 85.3 FL (ref 80–100)
PERFORMED ON: ABNORMAL
PHOSPHATE SERPL-MCNC: 2.7 MG/DL (ref 2.5–4.9)
PLATELET # BLD AUTO: 263 K/UL (ref 135–450)
PMV BLD AUTO: 7.4 FL (ref 5–10.5)
POTASSIUM SERPL-SCNC: 6.1 MMOL/L (ref 3.5–5.1)
RBC # BLD AUTO: 3.12 M/UL (ref 4.2–5.9)
SODIUM SERPL-SCNC: 134 MMOL/L (ref 136–145)
WBC # BLD AUTO: 7 K/UL (ref 4–11)

## 2024-07-16 PROCEDURE — 85027 COMPLETE CBC AUTOMATED: CPT

## 2024-07-16 PROCEDURE — 94660 CPAP INITIATION&MGMT: CPT

## 2024-07-16 PROCEDURE — C8929 TTE W OR WO FOL WCON,DOPPLER: HCPCS

## 2024-07-16 PROCEDURE — 2700000000 HC OXYGEN THERAPY PER DAY

## 2024-07-16 PROCEDURE — 90935 HEMODIALYSIS ONE EVALUATION: CPT

## 2024-07-16 PROCEDURE — 6370000000 HC RX 637 (ALT 250 FOR IP): Performed by: NURSE PRACTITIONER

## 2024-07-16 PROCEDURE — 36415 COLL VENOUS BLD VENIPUNCTURE: CPT

## 2024-07-16 PROCEDURE — 93306 TTE W/DOPPLER COMPLETE: CPT | Performed by: INTERNAL MEDICINE

## 2024-07-16 PROCEDURE — 94761 N-INVAS EAR/PLS OXIMETRY MLT: CPT

## 2024-07-16 PROCEDURE — 86901 BLOOD TYPING SEROLOGIC RH(D): CPT

## 2024-07-16 PROCEDURE — 80069 RENAL FUNCTION PANEL: CPT

## 2024-07-16 PROCEDURE — 86850 RBC ANTIBODY SCREEN: CPT

## 2024-07-16 PROCEDURE — 2500000003 HC RX 250 WO HCPCS: Performed by: NURSE PRACTITIONER

## 2024-07-16 PROCEDURE — 2580000003 HC RX 258

## 2024-07-16 PROCEDURE — 2060000000 HC ICU INTERMEDIATE R&B

## 2024-07-16 PROCEDURE — 6370000000 HC RX 637 (ALT 250 FOR IP): Performed by: INTERNAL MEDICINE

## 2024-07-16 PROCEDURE — 6360000002 HC RX W HCPCS: Performed by: STUDENT IN AN ORGANIZED HEALTH CARE EDUCATION/TRAINING PROGRAM

## 2024-07-16 PROCEDURE — 86923 COMPATIBILITY TEST ELECTRIC: CPT

## 2024-07-16 PROCEDURE — 86900 BLOOD TYPING SEROLOGIC ABO: CPT

## 2024-07-16 PROCEDURE — 6370000000 HC RX 637 (ALT 250 FOR IP)

## 2024-07-16 PROCEDURE — 6360000004 HC RX CONTRAST MEDICATION

## 2024-07-16 PROCEDURE — 6360000002 HC RX W HCPCS

## 2024-07-16 RX ORDER — ACETAMINOPHEN 500 MG
1000 TABLET ORAL ONCE
Status: DISCONTINUED | OUTPATIENT
Start: 2024-07-17 | End: 2024-07-16

## 2024-07-16 RX ORDER — CHLORHEXIDINE GLUCONATE ORAL RINSE 1.2 MG/ML
15 SOLUTION DENTAL ONCE
Status: DISCONTINUED | OUTPATIENT
Start: 2024-07-17 | End: 2024-07-16

## 2024-07-16 RX ORDER — SODIUM CHLORIDE 9 MG/ML
INJECTION, SOLUTION INTRAVENOUS CONTINUOUS
Status: DISCONTINUED | OUTPATIENT
Start: 2024-07-17 | End: 2024-07-16

## 2024-07-16 RX ORDER — ASPIRIN 81 MG/1
81 TABLET ORAL ONCE
Status: DISCONTINUED | OUTPATIENT
Start: 2024-07-17 | End: 2024-07-16

## 2024-07-16 RX ORDER — CEFAZOLIN SODIUM IN 0.9 % NACL 2 G/100 ML
2000 PLASTIC BAG, INJECTION (ML) INTRAVENOUS
Status: DISCONTINUED | OUTPATIENT
Start: 2024-07-17 | End: 2024-07-16

## 2024-07-16 RX ORDER — ASPIRIN 81 MG/1
81 TABLET, CHEWABLE ORAL DAILY
Status: DISCONTINUED | OUTPATIENT
Start: 2024-07-17 | End: 2024-07-19 | Stop reason: HOSPADM

## 2024-07-16 RX ORDER — BISACODYL 10 MG
10 SUPPOSITORY, RECTAL RECTAL ONCE
Status: DISCONTINUED | OUTPATIENT
Start: 2024-07-16 | End: 2024-07-16

## 2024-07-16 RX ORDER — HEPARIN SODIUM 1000 [USP'U]/ML
INJECTION, SOLUTION INTRAVENOUS; SUBCUTANEOUS
Status: DISPENSED
Start: 2024-07-16 | End: 2024-07-16

## 2024-07-16 RX ORDER — METOPROLOL SUCCINATE 25 MG/1
25 TABLET, EXTENDED RELEASE ORAL DAILY
Status: DISCONTINUED | OUTPATIENT
Start: 2024-07-17 | End: 2024-07-19 | Stop reason: HOSPADM

## 2024-07-16 RX ORDER — CHLORHEXIDINE GLUCONATE 40 MG/ML
SOLUTION TOPICAL 2 TIMES DAILY
Status: DISCONTINUED | OUTPATIENT
Start: 2024-07-16 | End: 2024-07-16

## 2024-07-16 RX ADMIN — EPOETIN ALFA-EPBX 10000 UNITS: 10000 INJECTION, SOLUTION INTRAVENOUS; SUBCUTANEOUS at 13:34

## 2024-07-16 RX ADMIN — SODIUM CHLORIDE, PRESERVATIVE FREE 10 ML: 5 INJECTION INTRAVENOUS at 15:04

## 2024-07-16 RX ADMIN — VENLAFAXINE HYDROCHLORIDE 75 MG: 37.5 CAPSULE, EXTENDED RELEASE ORAL at 15:06

## 2024-07-16 RX ADMIN — HEPARIN SODIUM 5000 UNITS: 5000 INJECTION INTRAVENOUS; SUBCUTANEOUS at 21:20

## 2024-07-16 RX ADMIN — BUSPIRONE HYDROCHLORIDE 10 MG: 5 TABLET ORAL at 15:06

## 2024-07-16 RX ADMIN — INSULIN GLARGINE 15 UNITS: 100 INJECTION, SOLUTION SUBCUTANEOUS at 21:21

## 2024-07-16 RX ADMIN — HEPARIN SODIUM 5000 UNITS: 5000 INJECTION INTRAVENOUS; SUBCUTANEOUS at 06:46

## 2024-07-16 RX ADMIN — BUSPIRONE HYDROCHLORIDE 10 MG: 5 TABLET ORAL at 21:20

## 2024-07-16 RX ADMIN — PREGABALIN 25 MG: 25 CAPSULE ORAL at 06:46

## 2024-07-16 RX ADMIN — OXYCODONE 10 MG: 5 TABLET ORAL at 21:20

## 2024-07-16 RX ADMIN — PANTOPRAZOLE SODIUM 40 MG: 40 TABLET, DELAYED RELEASE ORAL at 17:24

## 2024-07-16 RX ADMIN — PANTOPRAZOLE SODIUM 40 MG: 40 TABLET, DELAYED RELEASE ORAL at 06:46

## 2024-07-16 RX ADMIN — QUETIAPINE FUMARATE 50 MG: 25 TABLET ORAL at 21:25

## 2024-07-16 RX ADMIN — HEPARIN SODIUM 5000 UNITS: 5000 INJECTION INTRAVENOUS; SUBCUTANEOUS at 15:04

## 2024-07-16 RX ADMIN — CALCIUM ACETATE 1334 MG: 667 CAPSULE ORAL at 15:05

## 2024-07-16 RX ADMIN — PERFLUTREN 1.5 ML: 6.52 INJECTION, SUSPENSION INTRAVENOUS at 08:56

## 2024-07-16 RX ADMIN — TRAZODONE HYDROCHLORIDE 50 MG: 50 TABLET ORAL at 21:25

## 2024-07-16 RX ADMIN — MUPIROCIN: 20 OINTMENT TOPICAL at 21:22

## 2024-07-16 RX ADMIN — ASPIRIN 81 MG 81 MG: 81 TABLET ORAL at 15:06

## 2024-07-16 RX ADMIN — PRAVASTATIN SODIUM 40 MG: 40 TABLET ORAL at 15:05

## 2024-07-16 RX ADMIN — SODIUM ZIRCONIUM CYCLOSILICATE 10 G: 10 POWDER, FOR SUSPENSION ORAL at 15:05

## 2024-07-16 RX ADMIN — OXYCODONE 10 MG: 5 TABLET ORAL at 15:02

## 2024-07-16 RX ADMIN — DULOXETINE HYDROCHLORIDE 60 MG: 60 CAPSULE, DELAYED RELEASE ORAL at 15:06

## 2024-07-16 RX ADMIN — ISOSORBIDE MONONITRATE 30 MG: 30 TABLET, EXTENDED RELEASE ORAL at 15:05

## 2024-07-16 RX ADMIN — MUPIROCIN: 20 OINTMENT TOPICAL at 15:04

## 2024-07-16 RX ADMIN — CALCIUM ACETATE 1334 MG: 667 CAPSULE ORAL at 21:20

## 2024-07-16 RX ADMIN — SODIUM CHLORIDE, PRESERVATIVE FREE 10 ML: 5 INJECTION INTRAVENOUS at 21:22

## 2024-07-16 ASSESSMENT — PAIN DESCRIPTION - ORIENTATION: ORIENTATION: RIGHT

## 2024-07-16 ASSESSMENT — PAIN DESCRIPTION - LOCATION
LOCATION: BACK
LOCATION: RIB CAGE;HIP

## 2024-07-16 ASSESSMENT — PAIN SCALES - GENERAL
PAINLEVEL_OUTOF10: 5
PAINLEVEL_OUTOF10: 9
PAINLEVEL_OUTOF10: 8

## 2024-07-16 NOTE — PROGRESS NOTES
V2.0    [facility] [place]      Name:  Igor Ann /Age/Sex: 1970  (54 y.o. male)   MRN & CSN:  [id number] & [id number] Encounter Date/Time: 2024 9:14 AM EDT   Location:  [id number] PCP: Vonnie Cleveland APRN - NP     Attending:Ho Neff DO       Hospital Day: 8    Assessment and Recommendations   Igor Ann is a 54 y.o. male with pmh of COPD, CAD status post 3 stents, ESRD on dialysis, GERD, heart failure, type 2 diabetes, and ZAINAB who presents with Coronary artery disease, unspecified vessel or lesion type, unspecified whether angina present, unspecified whether native or transplanted heart        Plan:   Chest pain/CAD status post 3 stents   -Ramus, left circumflex, right coronary artery stent placed in   -Troponins flat trajectory, EKG with T wave inversions new compared to previous  -Cardiology consulted  -Cath cath showed restenosis of the ramus, left circumflex, right coronary artery  -Patient received 3 additional stents  in the subsequent arteries listed above  -Continue aspirin 81  -Continue pravastatin 40 mg  -Holding Plavix by CT surgery anticipation for CABG  -Eliquis currently on hold  -CT surgery consulted and prepping for CABG   -Echo today  -Plan remains unchanged patient will get CABG     ESRD on dialysis  -Hemodialysis on ,  -Nephrology has been consulted    Heart failure reduced EF  -Previous echo showed 45 to 50%  -Continue Entresto  -Continue metoprolol  -CHF order set in place  -Echo on Monday    Type 2 diabetes  Continue 15 units nightly  Low-dose sliding scale    GERD  -Possible hematemesis  -GI consulted  -EGD negative  -Will continue pantoprazole 40 mg twice daily    Chronic normocytic anemia  Continue monitor CBC  No signs of bleeding, holding Eliquis  Secondary to CKD  Patient gets Retacrit    Chronic osteomyelitis  Status post foot I&D with MT bone resection  Patient finished antibiotics and was following with  7/17/2024] ceFAZolin (ANCEF) IVPB  2,000 mg IntraVENous On Call to OR    [START ON 7/17/2024] vancomycin (VANCOCIN) IV  1,500 mg IntraVENous On Call to OR    [START ON 7/17/2024] metoprolol tartrate  12.5 mg Oral Once    [START ON 7/17/2024] [facility]   Perfusion Once    Followed by    [START ON 7/17/2024] [facility]   Perfusion Once    Followed by    [START ON 7/17/2024] [facility]   Perfusion Once    isosorbide mononitrate  30 mg Oral Daily    sodium chloride flush  5-40 mL IntraVENous 2 times per day    docusate sodium  100 mg Oral Daily    epoetin siddharth-epbx  10,000 Units IntraVENous Once per day on Tue Thu Sat    heparin (porcine)  5,000 Units SubCUTAneous 3 times per day    busPIRone  10 mg Oral TID    calcium acetate  2 capsule Oral TID    DULoxetine  60 mg Oral Daily    insulin glargine  15 Units SubCUTAneous Nightly    pravastatin  40 mg Oral Daily    pregabalin  25 mg Oral Daily    QUEtiapine  50 mg Oral Nightly    traZODone  50 mg Oral Daily    venlafaxine  75 mg Oral Daily    aspirin  81 mg Oral Daily    [Held by provider] clopidogrel  75 mg Oral Daily    pantoprazole  40 mg Oral BID AC    insulin lispro  0-4 Units SubCUTAneous TID WC    insulin lispro  0-4 Units SubCUTAneous Nightly      Infusions:    [START ON 7/17/2024] sodium chloride      [START ON 7/17/2024] insulin regular (HUMULIN R;NOVOLIN R) 100 Units in sodium chloride 0.9 % 100 mL infusion      sodium chloride      sodium chloride      dextrose      sodium chloride       PRN Meds:   heparin (porcine), ,   sodium chloride flush, 5-40 mL, PRN  sodium chloride, , PRN  heparin (porcine), 3,600 Units, PRN  oxyCODONE, 10 mg, Q4H PRN  ipratropium 0.5 mg-albuterol 2.5 mg, 1 Dose, Q4H PRN  sodium chloride, , PRN  glucose, 4 tablet, PRN  dextrose bolus, 125 mL, PRN   Or  dextrose bolus, 250 mL, PRN  glucagon (rDNA), 1 mg, PRN  dextrose, , Continuous PRN  sodium chloride, , PRN  acetaminophen,

## 2024-07-16 NOTE — PROGRESS NOTES
07/15/24 1755   NIV Type   NIV Started/Stopped On   Equipment Type v60   Mode Bilevel   Mask Type Full face mask   Mask Size Medium   Assessment   Respirations 17   SpO2 100 %   Comfort Level Good   Using Accessory Muscles No   Mask Compliance Good   Skin Assessment Clean, dry, & intact   Skin Protection for O2 Device No  (pt refused)   Settings/Measurements   PIP Observed 13 cm H20   IPAP 12 cmH20   CPAP/EPAP 6 cmH2O   Vt (Measured) 620 mL   Rate Ordered 12   FiO2  35 %  (decreased to 30%)   I Time/ I Time % 0.9 s   Minute Volume (L/min) 10.3 Liters   Mask Leak (lpm) 4 lpm   Patient's Home Machine No   Alarm Settings   Alarms On Y   Low Pressure (cmH2O) 6 cmH2O   High Pressure (cmH2O) 30 cmH2O   Apnea (secs) 20 secs   RR Low (bpm) 6   RR High (bpm) 40 br/min

## 2024-07-16 NOTE — PROGRESS NOTES
Comprehensive Nutrition Assessment    Type and Reason for Visit:  Reassess    Nutrition Recommendations/Plan:   Recommend low potassium, NIDA diet  Encourage po intakes  Nepro BID  Monitor po intakes, nutrition adequacy, weights, pertinent labs, BMs     Malnutrition Assessment:  Malnutrition Status:  At risk for malnutrition (Comment) (07/10/24 1405)      Nutrition Assessment:    Follow up: Pt out of room today, at HD. Pt currently on a regular diet with po intakes % per EMR. Pt has Nepro ONS ordered TID as well. Labs revewed. BG stable. K+ 6.1. Will continue ONS and add low potassium modifier to diet order. Will monitor.    Nutrition Related Findings:    BM x1 on 7/15. BG stable. K 6.1. Wound Type: Surgical Incision       Current Nutrition Intake & Therapies:    Average Meal Intake: %  Average Supplements Intake: Unable to assess  ADULT DIET; Regular  ADULT ORAL NUTRITION SUPPLEMENT; Breakfast, Lunch, Dinner; Renal Oral Supplement  Diet NPO Exceptions are: Sips of Water with Meds    Anthropometric Measures:  Height: 175.3 cm (5' 9\")  Ideal Body Weight (IBW): 160 lbs (73 kg)       Current Body Weight: 97.2 kg (214 lb 4.6 oz), 130.6 % IBW. Weight Source: Bed Scale  Current BMI (kg/m2): 31.6  Usual Body Weight: 98.4 kg (217 lb) (4/11/24 bed scale)  % Weight Change (Calculated): -3.7                    BMI Categories: Obese Class 1 (BMI 30.0-34.9)    Estimated Daily Nutrient Needs:  Energy Requirements Based On: Kcal/kg (25-30)  Weight Used for Energy Requirements: Ideal  Energy (kcal/day): 8124-9344 kcal  Weight Used for Protein Requirements: Ideal (1.0-1.2 g/kg)  Protein (g/day): 73-88 g     Fluid (ml/day): Less than 2000 mL per CHF recommendations    Nutrition Diagnosis:   Inadequate oral intake related to inadequate protein-energy intake as evidenced by intake 0-25%, intake 51-75%    Nutrition Interventions:   Food and/or Nutrient Delivery: Continue Current Diet, Continue Oral Nutrition  Supplement  Nutrition Education/Counseling:  (unavailable at time of visit)  Coordination of Nutrition Care: Continue to monitor while inpatient       Goals:  Previous Goal Met: Progressing toward Goal(s)  Goals: PO intake 50% or greater, prior to discharge       Nutrition Monitoring and Evaluation:   Behavioral-Environmental Outcomes: None Identified  Food/Nutrient Intake Outcomes: Food and Nutrient Intake, Supplement Intake  Physical Signs/Symptoms Outcomes: Biochemical Data, Nutrition Focused Physical Findings, Weight    Discharge Planning:    Continue current diet, Continue Oral Nutrition Supplement     Angelique Guerrero RD, LD  Contact: [phone]

## 2024-07-16 NOTE — PROGRESS NOTES
Podiatric Surgery Daily Progress Note  Igor Ann  Subjective :   Patient seen and examined at the bedside. Patient denies any acute overnight events. Patient denies N/V/F/C/SOB. Patient denies calf pain, thigh pain. States he is supposed to get stents placed in his heart vs open heart surgery while he is here.        Objective     BP (!) 157/84   Pulse 86   Temp 97.8 °F (36.6 °C)   Resp 16   Ht 1.753 m (5' 9\")   Wt 97.2 kg (214 lb 4.6 oz)   SpO2 100%   BMI 31.64 kg/m²      I/O:  Intake/Output Summary (Last 24 hours) at 7/16/2024 2014  Last data filed at 7/16/2024 1500  Gross per 24 hour   Intake 320 ml   Output 50 ml   Net 270 ml              Wt Readings from Last 3 Encounters:   07/16/24 97.2 kg (214 lb 4.6 oz)   07/09/24 96 kg (211 lb 10.3 oz)   07/05/24 102.1 kg (225 lb)       LABS:    Recent Labs     07/15/24  0928 07/16/24  0522   WBC 7.0 7.0   HGB 8.5* 8.5*   HCT 27.3* 26.6*    263        Recent Labs     07/16/24  0522   *   K 6.1*   CL 99   CO2 24   PHOS 2.7   BUN 82*   CREATININE 8.1*        Recent Labs     07/15/24  0928   INR 1.06           LOWER EXTREMITY EXAMINATION    Integument:  Skin is warm, dry and supple, bilateral. Ulcer to the distal right TMA stump measuring approximately 6 cm x 4 cm x 0.3 cm deep with deep fascia exposure noted. Wound w/ healthy granular base, no SOI.    Neurologic:  Protective sensation is grossly diminished to light touch, bilateral.  Vascular:  DP and PT pulses are nonpalpable, bilateral.  Musculoskeletal:  Patient has 5/5 strength on inversion/everison/dorsiflexion/plantarflexion, bilateral.  No gross musculoskeletal deformities noted. Previous R TMA       Imaging: None required for the foot    Procedural Note: Excisional wound debridement to deep fascia/muscle, right foot   Using a #15 blade, the wound to the distal right TMA stump was excisionally debrided to the level of deep fascia/muscle. Area then flushed w/ saline and dressed w/ 4x4's,  kerlix, and ACE.      Impression/Recommendations:    The patient is a pleasant 54 y.o. male with:  #Ulcer, right midfoot with necrosis of muscle  #DMII  #PVD  - Pt w/ healthy ulcer to his distal right TMA stump w/ no SOI. Continue local wound care only. Will perform weekly wound debridements while in house. No surgical intervention warranted from a podiatry standpoint. Pt to follow up w/ me OP upon DC, AVS updated.      Please call me with any questions.     Corrie Berg DPM  Office: [phone]  Cell: [phone]

## 2024-07-16 NOTE — CARE COORDINATION
Chart review day 7- from [facility]. Chest pain. Plan for CABG on 7/17. Cm will follow for DCP needs.

## 2024-07-16 NOTE — FLOWSHEET NOTE
07/16/24 1054 07/16/24 1412   Vital Signs   BP (!) 141/29 (!) 115/30   Temp 98.1 °F (36.7 °C) 97.8 °F (36.6 °C)   Pulse 79 83   Respirations 16 16       Treatment time: 3.5 hours  Net UF: 3 L    Pre weight: 100.2 kg (bed scale)  Post weight: 97.2 kg (bed scale)  EDW: 93 kg     Access used: [facility] HD tunneled cath  Access function: No problems    Medications or blood products given: Retacrit 03426 units IVP    Regular outpatient schedule: [facility]    Summary of response to treatment: Tolerated 3.5 hour HD tx without difficulty.  Net UF 3 L.  Vital signs stable throughout tx.      Crit line: H1=24.6, H2=24.5, difference of .1, no refill present.  Ending profile A.    Copy of dialysis treatment record placed in chart, to be scanned into EMR.    Report called to Farhan Saldana RN

## 2024-07-16 NOTE — PROGRESS NOTES
07/16/24 0343   NIV Type   $NIV $Daily Charge   NIV Started/Stopped On   Equipment Type v60   Mode Bilevel   Mask Type Full face mask   Mask Size Medium   Assessment   Respirations 17   Settings/Measurements   IPAP 12 cmH20   CPAP/EPAP 6 cmH2O   Vt (Measured) 572 mL   FiO2  30 %   Mask Leak (lpm) 11 lpm   Patient's Home Machine No   Alarm Settings   Alarms On Y   Low Pressure (cmH2O) 6 cmH2O   High Pressure (cmH2O) 30 cmH2O

## 2024-07-16 NOTE — PROGRESS NOTES
CVTS Thoracic Progress Note:          CC: severe  multivessel CAD    Subj: Pt resting in bed, comfortable.     Obj:    Blood pressure (!) 182/81, pulse 89, temperature 97.5 °F (36.4 °C), temperature source Oral, resp. rate 16, height 1.753 m (5' 9\"), weight 106.9 kg (235 lb 10.8 oz), SpO2 98 %.    Lungs CTAB   Cardiac S1S2 no M/R/G   Abdomen Soft and non-tender     Diagnostics:   PT/INR:    Lab Results   Component Value Date/Time    PROTIME 14.0 07/15/2024 09:28 AM    INR 1.06 07/15/2024 09:28 AM     Troponin:    Lab Results   Component Value Date/Time    TROPONINI 0.11 04/18/2023 01:27 AM     Recent Labs     07/14/24  1047 07/15/24  0928 07/16/24  0522   WBC 7.6 7.0 7.0   HGB 8.8* 8.5* 8.5*   HCT 27.5* 27.3* 26.6*    293 263                                                                  Recent Labs     07/14/24  1047 07/15/24  0928 07/16/24  0522   * 133* 134*   K 5.2* 5.9* 6.1*   CL 98* 97* 99   CO2 26 23 24   BUN 53* 67* 82*   CREATININE 5.7* 6.8* 8.1*   GLUCOSE 100* 124* 142*   PHOS  --   --  2.7            Recent Labs     07/14/24  1047   MG 1.90      Echo Findings:  Left Ventricle Moderately reduced left ventricular systolic function with a visually estimated EF of 35 - 40%. EF by 2D Simpsons Biplane is 41%. Left ventricle is moderately dilated. Mildly increased wall thickness. Findings consistent with mild concentric hypertrophy.  Difficult to fully evaluate LV function due to poor endocardial visualization, if appears to be more reduced on original images, images with contrast show improved function. Global hypokinesis present  With regional variation with predominantly apical hypokinesis Grade II diastolic dysfunction with increased LAP.   Left Atrium Left atrium is moderately dilated.   Right Ventricle Right ventricle size is normal. Prominent and calcified chordae noted. Normal systolic function. TAPSE is 2.0 cm. RV Peak S' is 10 cm/s.   Right Atrium Catheter present in the right atrium.  Right atrium size is normal.   Aortic Valve Langford Leyda 3 appropriately positioned transcatheter bioprosthetic valve with a size of 29 mm. AV mean gradient is 3 mmHg.  Annulus is thickened No paravalvular regurgitation. Normal prosthetic gradient.   Mitral Valve Annular calcification. Calcified leaflets. No regurgitation. Trace stenosis noted.   Tricuspid Valve Valve structure is normal. Trace regurgitation. No stenosis noted.   Pulmonic Valve The pulmonic valve was not well visualized. No regurgitation. No stenosis noted.   Aorta Normal sized aortic root and ascending aorta.   IVC/Hepatic Veins IVC was not assessed due to poor image quality.   Pericardium Small (<1 cm) localized pericardial effusion present around the lateral and left ventricle. No indication of cardiac tamponade.        Assess/Plan:   Care per primary team.     Severe multivessel CAD:   -Repeat ECHO and HD today   -Pre-op testing ordered, and reviewed.  -Hold Plavix and Entresto.  -Last dose of Eliquis 7/5.  -Plan for CABG tomorrow morning.   -NPO at midnight.  _______________________________________________    JAY Wiseman - CNP  7/16/2024  12:23 PM

## 2024-07-16 NOTE — PROGRESS NOTES
Pt blood sugar reading is 228. Attempted to administer insulin per sliding scale orders. Patient refusing and states \"I'm not being stuck for one unit of insulin, I'll wait until they recheck me later.\" MD made aware.

## 2024-07-16 NOTE — PROGRESS NOTES
[facility] Progress Note           Subjective/   54 y.o. year old male who we are seeing in consultation for ESKD on HD TTS.     HPI:  Last HD on 7/13 with 3.3 liters removed, post-weight of 95.2 kg.  Denies any shortness of breath, on 4 L NC which he uses at home.    ROS:  No chest pain, +weak.    Objective/   GEN:  Chronically ill, BP (!) 151/61   Pulse 70   Temp 97.2 °F (36.2 °C) (Oral)   Resp 15   Ht 1.753 m (5' 9\")   Wt 106.9 kg (235 lb 10.8 oz)   SpO2 97%   BMI 34.80 kg/m²   HEENT: non-icteric, no JVD  CV: S1, S2 without m/r/g; no LE edema  RESP: CTA B without w/r/r; breathing wnl  ABD: +bs, soft, nt, no hsm  SKIN: warm, no rashes  ACCESS: Left IJ TDC    Data/  Recent Labs     07/14/24  1047 07/15/24  0928 07/16/24  0522   WBC 7.6 7.0 7.0   HGB 8.8* 8.5* 8.5*   HCT 27.5* 27.3* 26.6*   MCV 84.2 84.9 85.3    293 263       Recent Labs     07/14/24  1047 07/15/24  0928 07/16/24  0522   * 133* 134*   K 5.2* 5.9* 6.1*   CL 98* 97* 99   CO2 26 23 24   GLUCOSE 100* 124* 142*   PHOS  --   --  2.7   MG 1.90  --   --    BUN 53* 67* 82*   CREATININE 5.7* 6.8* 8.1*   LABGLOM 11* 9* 7*         Assessment/     - ESKD on HD Tuesday Thursday Saturday at [facility]    -Hyperkalemia    -Anemia    -CHF with reduced EF with bilateral pleural effusions   Unsuccessful thoracentesis on 7/15    -CAD/Chest pain                Angiogram with significant CAD, CT surgery has been consulted and planning for CABG      Plan/     - HD per TTS schedule, UF as tolerated, EDW 93 kg  - Repeat Lokelma 10 grams X1  - Retacrit 10K qHD  - Trend labs, bp's, weights    ____________________________________  Ricky Carlos MD  [facility]  [email or web address]  Office: [phone]

## 2024-07-17 ENCOUNTER — APPOINTMENT (OUTPATIENT)
Dept: GENERAL RADIOLOGY | Age: 54
DRG: 286 | End: 2024-07-17
Attending: INTERNAL MEDICINE
Payer: MEDICARE

## 2024-07-17 LAB
ALBUMIN SERPL-MCNC: 3.3 G/DL (ref 3.4–5)
ALP SERPL-CCNC: 153 U/L (ref 40–129)
ALT SERPL-CCNC: 17 U/L (ref 10–40)
ANION GAP SERPL CALCULATED.3IONS-SCNC: 8 MMOL/L (ref 3–16)
AST SERPL-CCNC: 12 U/L (ref 15–37)
BACTERIA URNS QL MICRO: ABNORMAL /HPF
BASOPHILS # BLD: 0.1 K/UL (ref 0–0.2)
BASOPHILS NFR BLD: 1.1 %
BILIRUB DIRECT SERPL-MCNC: <0.2 MG/DL (ref 0–0.3)
BILIRUB INDIRECT SERPL-MCNC: ABNORMAL MG/DL (ref 0–1)
BILIRUB SERPL-MCNC: <0.2 MG/DL (ref 0–1)
BILIRUB UR QL STRIP.AUTO: NEGATIVE
BUN SERPL-MCNC: 65 MG/DL (ref 7–20)
CALCIUM SERPL-MCNC: 9.5 MG/DL (ref 8.3–10.6)
CHLORIDE SERPL-SCNC: 96 MMOL/L (ref 99–110)
CHOLEST SERPL-MCNC: 102 MG/DL (ref 0–199)
CLARITY UR: CLEAR
CO2 SERPL-SCNC: 28 MMOL/L (ref 21–32)
COLOR UR: YELLOW
CREAT SERPL-MCNC: 6.1 MG/DL (ref 0.9–1.3)
DEPRECATED RDW RBC AUTO: 18.3 % (ref 12.4–15.4)
EOSINOPHIL # BLD: 0.3 K/UL (ref 0–0.6)
EOSINOPHIL NFR BLD: 5.2 %
GFR SERPLBLD CREATININE-BSD FMLA CKD-EPI: 10 ML/MIN/{1.73_M2}
GLUCOSE BLD-MCNC: 109 MG/DL (ref 70–99)
GLUCOSE BLD-MCNC: 111 MG/DL (ref 70–99)
GLUCOSE BLD-MCNC: 125 MG/DL (ref 70–99)
GLUCOSE BLD-MCNC: 177 MG/DL (ref 70–99)
GLUCOSE SERPL-MCNC: 128 MG/DL (ref 70–99)
GLUCOSE UR STRIP.AUTO-MCNC: 100 MG/DL
HCT VFR BLD AUTO: 26.7 % (ref 40.5–52.5)
HDLC SERPL-MCNC: 46 MG/DL (ref 40–60)
HGB BLD-MCNC: 8.3 G/DL (ref 13.5–17.5)
HGB UR QL STRIP.AUTO: NEGATIVE
INR PPP: 1.08 (ref 0.85–1.15)
KETONES UR STRIP.AUTO-MCNC: NEGATIVE MG/DL
LDLC SERPL CALC-MCNC: 42 MG/DL
LEUKOCYTE ESTERASE UR QL STRIP.AUTO: ABNORMAL
LYMPHOCYTES # BLD: 0.8 K/UL (ref 1–5.1)
LYMPHOCYTES NFR BLD: 13.7 %
MAGNESIUM SERPL-MCNC: 2 MG/DL (ref 1.8–2.4)
MCH RBC QN AUTO: 26.6 PG (ref 26–34)
MCHC RBC AUTO-ENTMCNC: 31.3 G/DL (ref 31–36)
MCV RBC AUTO: 85 FL (ref 80–100)
MONOCYTES # BLD: 0.7 K/UL (ref 0–1.3)
MONOCYTES NFR BLD: 11.1 %
NEUTROPHILS # BLD: 4.2 K/UL (ref 1.7–7.7)
NEUTROPHILS NFR BLD: 68.9 %
NITRITE UR QL STRIP.AUTO: NEGATIVE
PERFORMED ON: ABNORMAL
PH UR STRIP.AUTO: 8 [PH] (ref 5–8)
PLATELET # BLD AUTO: 260 K/UL (ref 135–450)
PMV BLD AUTO: 7.2 FL (ref 5–10.5)
POTASSIUM SERPL-SCNC: 5.6 MMOL/L (ref 3.5–5.1)
PROT SERPL-MCNC: 6.6 G/DL (ref 6.4–8.2)
PROT UR STRIP.AUTO-MCNC: >=300 MG/DL
PROTHROMBIN TIME: 14.2 SEC (ref 11.9–14.9)
RBC # BLD AUTO: 3.14 M/UL (ref 4.2–5.9)
RBC #/AREA URNS HPF: ABNORMAL /HPF (ref 0–4)
SODIUM SERPL-SCNC: 132 MMOL/L (ref 136–145)
SP GR UR STRIP.AUTO: 1.02 (ref 1–1.03)
TRIGL SERPL-MCNC: 68 MG/DL (ref 0–150)
UA DIPSTICK W REFLEX MICRO PNL UR: YES
URN SPEC COLLECT METH UR: ABNORMAL
UROBILINOGEN UR STRIP-ACNC: 0.2 E.U./DL
VLDLC SERPL CALC-MCNC: 14 MG/DL
WBC # BLD AUTO: 6.2 K/UL (ref 4–11)
WBC #/AREA URNS HPF: ABNORMAL /HPF (ref 0–5)

## 2024-07-17 PROCEDURE — 6370000000 HC RX 637 (ALT 250 FOR IP)

## 2024-07-17 PROCEDURE — 85025 COMPLETE CBC W/AUTO DIFF WBC: CPT

## 2024-07-17 PROCEDURE — 6370000000 HC RX 637 (ALT 250 FOR IP): Performed by: NURSE PRACTITIONER

## 2024-07-17 PROCEDURE — 80061 LIPID PANEL: CPT

## 2024-07-17 PROCEDURE — 71045 X-RAY EXAM CHEST 1 VIEW: CPT

## 2024-07-17 PROCEDURE — 6370000000 HC RX 637 (ALT 250 FOR IP): Performed by: INTERNAL MEDICINE

## 2024-07-17 PROCEDURE — 6360000002 HC RX W HCPCS: Performed by: NURSE PRACTITIONER

## 2024-07-17 PROCEDURE — 94660 CPAP INITIATION&MGMT: CPT

## 2024-07-17 PROCEDURE — 83735 ASSAY OF MAGNESIUM: CPT

## 2024-07-17 PROCEDURE — 80048 BASIC METABOLIC PNL TOTAL CA: CPT

## 2024-07-17 PROCEDURE — 2060000000 HC ICU INTERMEDIATE R&B

## 2024-07-17 PROCEDURE — 81001 URINALYSIS AUTO W/SCOPE: CPT

## 2024-07-17 PROCEDURE — 80076 HEPATIC FUNCTION PANEL: CPT

## 2024-07-17 PROCEDURE — 85610 PROTHROMBIN TIME: CPT

## 2024-07-17 PROCEDURE — 94640 AIRWAY INHALATION TREATMENT: CPT

## 2024-07-17 PROCEDURE — 94761 N-INVAS EAR/PLS OXIMETRY MLT: CPT

## 2024-07-17 PROCEDURE — 2700000000 HC OXYGEN THERAPY PER DAY

## 2024-07-17 PROCEDURE — 36415 COLL VENOUS BLD VENIPUNCTURE: CPT

## 2024-07-17 PROCEDURE — 2580000003 HC RX 258

## 2024-07-17 PROCEDURE — 83036 HEMOGLOBIN GLYCOSYLATED A1C: CPT

## 2024-07-17 PROCEDURE — 6360000002 HC RX W HCPCS: Performed by: STUDENT IN AN ORGANIZED HEALTH CARE EDUCATION/TRAINING PROGRAM

## 2024-07-17 PROCEDURE — 2500000003 HC RX 250 WO HCPCS: Performed by: NURSE PRACTITIONER

## 2024-07-17 RX ORDER — FUROSEMIDE 10 MG/ML
20 INJECTION INTRAMUSCULAR; INTRAVENOUS ONCE
Status: COMPLETED | OUTPATIENT
Start: 2024-07-17 | End: 2024-07-17

## 2024-07-17 RX ADMIN — BUSPIRONE HYDROCHLORIDE 10 MG: 5 TABLET ORAL at 20:56

## 2024-07-17 RX ADMIN — ASPIRIN 81 MG 81 MG: 81 TABLET ORAL at 14:56

## 2024-07-17 RX ADMIN — SODIUM CHLORIDE, PRESERVATIVE FREE 10 ML: 5 INJECTION INTRAVENOUS at 14:55

## 2024-07-17 RX ADMIN — CALCIUM ACETATE 1334 MG: 667 CAPSULE ORAL at 14:54

## 2024-07-17 RX ADMIN — OXYCODONE 10 MG: 5 TABLET ORAL at 20:56

## 2024-07-17 RX ADMIN — METOPROLOL SUCCINATE 25 MG: 25 TABLET, EXTENDED RELEASE ORAL at 14:57

## 2024-07-17 RX ADMIN — PREGABALIN 25 MG: 25 CAPSULE ORAL at 14:56

## 2024-07-17 RX ADMIN — QUETIAPINE FUMARATE 50 MG: 25 TABLET ORAL at 20:56

## 2024-07-17 RX ADMIN — FUROSEMIDE 20 MG: 10 INJECTION, SOLUTION INTRAMUSCULAR; INTRAVENOUS at 01:32

## 2024-07-17 RX ADMIN — DULOXETINE HYDROCHLORIDE 60 MG: 60 CAPSULE, DELAYED RELEASE ORAL at 14:54

## 2024-07-17 RX ADMIN — INSULIN GLARGINE 15 UNITS: 100 INJECTION, SOLUTION SUBCUTANEOUS at 20:57

## 2024-07-17 RX ADMIN — SODIUM CHLORIDE, PRESERVATIVE FREE 5 ML: 5 INJECTION INTRAVENOUS at 20:57

## 2024-07-17 RX ADMIN — PRAVASTATIN SODIUM 40 MG: 40 TABLET ORAL at 14:56

## 2024-07-17 RX ADMIN — DOCUSATE SODIUM 100 MG: 100 CAPSULE, LIQUID FILLED ORAL at 14:54

## 2024-07-17 RX ADMIN — HEPARIN SODIUM 5000 UNITS: 5000 INJECTION INTRAVENOUS; SUBCUTANEOUS at 20:57

## 2024-07-17 RX ADMIN — TRAZODONE HYDROCHLORIDE 50 MG: 50 TABLET ORAL at 20:57

## 2024-07-17 RX ADMIN — IPRATROPIUM BROMIDE AND ALBUTEROL SULFATE 1 DOSE: .5; 2.5 SOLUTION RESPIRATORY (INHALATION) at 01:18

## 2024-07-17 RX ADMIN — CALCIUM ACETATE 1334 MG: 667 CAPSULE ORAL at 20:56

## 2024-07-17 RX ADMIN — BUSPIRONE HYDROCHLORIDE 10 MG: 5 TABLET ORAL at 14:57

## 2024-07-17 RX ADMIN — ISOSORBIDE MONONITRATE 30 MG: 30 TABLET, EXTENDED RELEASE ORAL at 14:55

## 2024-07-17 RX ADMIN — HEPARIN SODIUM 5000 UNITS: 5000 INJECTION INTRAVENOUS; SUBCUTANEOUS at 14:54

## 2024-07-17 RX ADMIN — VENLAFAXINE HYDROCHLORIDE 75 MG: 37.5 CAPSULE, EXTENDED RELEASE ORAL at 14:56

## 2024-07-17 RX ADMIN — SODIUM ZIRCONIUM CYCLOSILICATE 10 G: 10 POWDER, FOR SUSPENSION ORAL at 14:53

## 2024-07-17 ASSESSMENT — PAIN SCALES - GENERAL
PAINLEVEL_OUTOF10: 9
PAINLEVEL_OUTOF10: 3

## 2024-07-17 ASSESSMENT — PAIN DESCRIPTION - ORIENTATION: ORIENTATION: MID

## 2024-07-17 ASSESSMENT — PAIN DESCRIPTION - LOCATION: LOCATION: BACK

## 2024-07-17 ASSESSMENT — PAIN DESCRIPTION - DESCRIPTORS: DESCRIPTORS: DISCOMFORT

## 2024-07-17 NOTE — PLAN OF CARE
CHF Care Plan      Patient's EF (Ejection Fraction) is less than 40%    Heart Failure Medications:  Diuretics:: Furosemide    (One of the following REQUIRED for EF </= 40%/SYSTOLIC FAILURE but MAY be used in EF% >40%/DIASTOLIC FAILURE)        ACE:: None        ARB:: None         ARNI:: None    (Beta Blockers)  NON- Evidenced Based Beta Blocker (for EF% >40%/DIASTOLIC FAILURE): None    Evidenced Based Beta Blocker::(REQUIRED for EF% <40%/SYSTOLIC FAILURE) Metoprolol SUCCinate- Toprol XL  ...................................................................................................................................................    Failed to redirect to the Timeline version of the [employer] SmartLink.      Patient's weights and intake/output reviewed    Daily Weight log at bedside, patient/family participation in use of log: \"yes    Patient's current weight today shows a difference of 1 lbs more than last documented weight.      Intake/Output Summary (Last 24 hours) at 7/17/2024 0622  Last data filed at 7/17/2024 0055  Gross per 24 hour   Intake 560 ml   Output 100 ml   Net 460 ml       Education Booklet Provided: yes    Comorbidities Reviewed Yes    Patient has a past medical history of Ambulatory dysfunction, Aortic stenosis, Arthritis, Asthma, Bilateral hilar adenopathy syndrome, CAD (coronary artery disease), Cardiomyopathy ([facility]), CHF, EF 35-40% (July 2024), Chronic pain, COPD (chronic obstructive pulmonary disease) ([facility]), CVA (cerebral vascular accident) ([facility]), Depression, Diabetes mellitus ([facility]), Difficult intravenous access, Emphysema of lung ([facility]), ESRD (end stage renal disease) on dialysis ([facility]), Fear of needles, Gastric ulcer, GERD (gastroesophageal reflux disease), HD, T/R/Sa, Heart valve problem, History of spinal fracture, History of transcatheter aortic valve replacement (TAVR), Hx of blood clots, Hyperlipidemia, Hypertension, MI (myocardial infarction) (HCC), Neuromuscular disorder (HCC), Numbness and

## 2024-07-17 NOTE — PROGRESS NOTES
07/17/24 0850   NIV Type   NIV Started/Stopped On   Equipment Type V60   Mode Bilevel   Mask Type Full face mask   Mask Size Medium   Assessment   Pulse 75   Respirations 21   SpO2 100 %   Comfort Level Good   Using Accessory Muscles No   Mask Compliance Good   Skin Assessment Clean, dry, & intact   Skin Protection for O2 Device No   Settings/Measurements   PIP Observed 13 cm H20   IPAP 12 cmH20   CPAP/EPAP 6 cmH2O   Vt (Measured) 502 mL   Rate Ordered 12   FiO2  30 %   I Time/ I Time % 0.9 s   Minute Volume (L/min) 9.1 Liters   Mask Leak (lpm) 1 lpm   Patient's Home Machine No   Alarm Settings   Alarms On Y   Low Pressure (cmH2O) 6 cmH2O   High Pressure (cmH2O) 30 cmH2O   Apnea (secs) 20 secs   RR Low (bpm) 6   RR High (bpm) 40 br/min   Oxygen Therapy/Pulse Ox   O2 Therapy Oxygen   $Oxygen $Daily Charge   O2 Device PAP (positive airway pressure)   $Pulse Oximeter $Spot check (multiple/continuous)        [Verbal] : Verbal [Demo] : Demo [Patient] : Patient [Good - alert, interested, motivated] : Good - alert, interested, motivated [Verbalizes knowledge/Understanding] : Verbalizes knowledge/understanding [Dressing changes] : dressing changes [Foot Care] : foot care [Skin Care] : skin care [Pressure relief] : pressure relief [Signs and symptoms of infection] : sign and symptoms of infection [Nutrition] : nutrition [How and When to Call] : how and when to call [Pain Management] : pain management [Off-loading] : off-loading [Patient responsibility to plan of care] : patient responsibility to plan of care [Glycemic Control] : glycemic control [Other: ____] : [unfilled] [Stable] : stable [Home] : Home [Ambulatory] : Ambulatory [Not Applicable - Long Term Care/Home Health Agency] : Long Term Care/Home Health Agency: Not Applicable [] : No [FreeTextEntry2] : Infection prevention Localized wound care Promote optimal skin integrity  Maintain acceptable level of pain with use of pharmacological and nonpharmacological interventions Offloading / Pressure relief  Daily foot care / monitoring  Collagen matrix  [FreeTextEntry3] : Wound remains the same [FreeTextEntry4] : DPM reviewed x-ray with patient, surgical interventions discussed. Patient to return Monday 5/20/24 to be admitted to hospital for surgery Patient is able to perform his own dressing changes, no supplies needed.

## 2024-07-17 NOTE — PROGRESS NOTES
Hospital Medicine Progress Note      Date of Admission: 7/9/2024  Hospital Day: 9    Chief Admission Complaint:  angina     Subjective:  Patient in hospital bed awake and alert. No new issues or complaints today.     Presenting Admission History:       Igor Ann is a/an 54 y.o. male with a significant past medical history of CAD, COPD, ESRD on dialysis, GERD, ICM with HFmrEF, and ZAINAB who presents to [facility] as a direct admit from [facility] for further cardiac evaluation. He had initially presented to [facility] ED with complaint of chest pain on 7/3. The pain was left-sided, radiated into left arm, lasted 10 minutes, and relieved by NTG given by EMS en route. Troponin was elevated at 113, but had a flat trajectory of 112->111->115. He was evaluate by cardiology service, had abnormal NM SPECT with possible ischemic changes in the LAD and RCA territories. He was sent here for interventional cardiology services. He currently denies any ongoing chest pain or symptoms on arrival here.     Assessment/Plan:      Current Principal Problem:  Coronary artery disease, unspecified vessel or lesion type, unspecified whether angina present, unspecified whether native or transplanted heart    Assessment/Plan     Angina  -Etiology unclear at this time, likely cardiac in nature   -Trop elevated   -EKG without JOSIE  -Cardiology consulted   -CT surgery consulted  -CABG cancelled; going with high risk PCI in future      ESRD  -Etiology unclear at this time; cannot rule out [id number] renal failure   -HD Tu/Th/Sa  -Nephrology consulted      CHF - chronic systolic failure w/ reduced EF 40-50% by Echo dated 7/5/24.  Likely due to hypertensive heart disease.  Patient is euvolemic w/ no evidence of acute decompensation.  Continue current medical mgt.   -ECHO pending     DM2 - normally controlled on home antiGlycemics - Oral/Insulin - held/continued.  Follow FSBS/SSI low regimen and monitored closely for hypoglycemic ADR w/ POCT. Last  interpreted, includes:   [x] Telemetry monitoring w/ NSR with a rate of 74   [x] Data Review (any 3)  [] Collateral history obtained from:    [x] All available Consultant notes from yesterday/today were reviewed  [x] All current labs were reviewed and interpreted for clinical significance   [x] Appropriate follow-up labs were ordered      Medications:  Personally reviewed in detail in conjunction w/ labs as documented for evidence of drug toxicity.     Infusion Medications    sodium chloride      sodium chloride      dextrose      sodium chloride       Scheduled Medications    sodium zirconium cyclosilicate  10 g Oral Once    metoprolol succinate  25 mg Oral Daily    aspirin  81 mg Oral Daily    isosorbide mononitrate  30 mg Oral Daily    sodium chloride flush  5-40 mL IntraVENous 2 times per day    docusate sodium  100 mg Oral Daily    epoetin siddharth-epbx  10,000 Units IntraVENous Once per day on Tue Thu Sat    heparin (porcine)  5,000 Units SubCUTAneous 3 times per day    busPIRone  10 mg Oral TID    calcium acetate  2 capsule Oral TID    DULoxetine  60 mg Oral Daily    insulin glargine  15 Units SubCUTAneous Nightly    pravastatin  40 mg Oral Daily    pregabalin  25 mg Oral Daily    QUEtiapine  50 mg Oral Nightly    traZODone  50 mg Oral Daily    venlafaxine  75 mg Oral Daily    [Held by provider] clopidogrel  75 mg Oral Daily    pantoprazole  40 mg Oral BID AC    insulin lispro  0-4 Units SubCUTAneous TID WC    insulin lispro  0-4 Units SubCUTAneous Nightly     PRN Meds: sodium chloride flush, sodium chloride, heparin (porcine), oxyCODONE, ipratropium 0.5 mg-albuterol 2.5 mg, sodium chloride, glucose, dextrose bolus **OR** dextrose bolus, glucagon (rDNA), dextrose, sodium chloride, acetaminophen **OR** acetaminophen, polyethylene glycol, prochlorperazine     Labs:  Personally reviewed and interpreted for clinical significance.     Recent Labs     07/15/24  0928 07/16/24  0522 07/17/24  1020   WBC 7.0 7.0 6.2   HGB  8.5* 8.5* 8.3*   HCT 27.3* 26.6* 26.7*    263 260     Recent Labs     07/15/24  0928 07/16/24  0522 07/17/24  1020   * 134* 132*   K 5.9* 6.1* 5.6*   CL 97* 99 96*   CO2 23 24 28   BUN 67* 82* 65*   CREATININE 6.8* 8.1* 6.1*   CALCIUM 9.3 9.4 9.5   MG  --   --  2.00   PHOS  --  2.7  --      Recent Labs     07/15/24  0928   PROBNP 29,397*     No results for input(s): \"LABA1C\" in the last 72 hours.  Recent Labs     07/17/24  1020   AST 12*   ALT 17   BILIDIR <0.2   BILITOT <0.2   ALKPHOS 153*     Recent Labs     07/15/24  0928 07/17/24  1020   INR 1.06 1.08       Urine Cultures:   Lab Results   Component Value Date/Time    LABURIN  05/23/2024 08:00 PM     <50,000 CFU/ml mixed skin/urogenital mauri. No further workup     Blood Cultures:   Lab Results   Component Value Date/Time    BC No Growth after 4 days of incubation. 05/02/2024 04:55 PM     Lab Results   Component Value Date/Time    BLOODCULT2 No Growth after 4 days of incubation. 05/02/2024 05:16 PM     Organism:   Lab Results   Component Value Date/Time    ORG Staphylococcus epidermidis 05/02/2024 01:23 PM         Ho Neff DO

## 2024-07-17 NOTE — PROGRESS NOTES
07/17/24 0004   NIV Type   NIV Started/Stopped On   Equipment Type V60   Mode Bilevel   Mask Type Full face mask   Mask Size Medium   Assessment   Respirations 20   SpO2 97 %   Comfort Level Good   Using Accessory Muscles No   Mask Compliance Good   Skin Assessment Clean, dry, & intact   Skin Protection for O2 Device No   Settings/Measurements   IPAP 12 cmH20   CPAP/EPAP 6 cmH2O   Vt (Measured) 512 mL   Rate Ordered 12   FiO2  30 %   Minute Volume (L/min) 10.4 Liters   Mask Leak (lpm) 6 lpm   Patient's Home Machine No   Alarm Settings   Alarms On Y

## 2024-07-17 NOTE — CARE COORDINATION
LOS 8.  Care managed by Hosp Med, CV surg, Card, Neph, Pod.  Poss plan for CABG- still under consideration.  Pt is from [facility] with HD there.  Will follow for needs. Mady Miranda RN

## 2024-07-17 NOTE — PROGRESS NOTES
07/17/24 1944   NIV Type   NIV Started/Stopped On   Equipment Type v60   Mode Bilevel   Mask Type Full face mask   Mask Size Medium   Assessment   Respirations 16   SpO2 98 %   Comfort Level Good   Using Accessory Muscles No   Mask Compliance Good   Skin Assessment Redness (see comment/note)   Skin Protection for O2 Device No   Settings/Measurements   PIP Observed 13 cm H20   IPAP 12 cmH20   CPAP/EPAP 6 cmH2O   Vt (Measured) 474 mL   Rate Ordered 12   FiO2  30 %   I Time/ I Time % 1 s   Minute Volume (L/min) 7.3 Liters   Mask Leak (lpm) 2 lpm   Patient's Home Machine No   Alarm Settings   Alarms On Y   Low Pressure (cmH2O) 6 cmH2O   High Pressure (cmH2O) 30 cmH2O   Delay Alarm 20 sec(s)   Apnea (secs) 20 secs   RR Low (bpm) 6   RR High (bpm) 40 br/min

## 2024-07-17 NOTE — PROGRESS NOTES
[facility] Progress Note           Subjective/   54 y.o. year old male who we are seeing in consultation for ESKD on HD TTS.     HPI:  Last HD on 7/16 with 3 liters removed, post-weight of 97.2 kg.  Denies any shortness of breath, on 4 L NC which he uses at home.    ROS:  No chest pain, +weak.    Objective/   GEN:  Chronically ill, /83   Pulse 73   Temp 98.1 °F (36.7 °C) (Axillary)   Resp 24   Ht 1.753 m (5' 9\")   Wt 97.9 kg (215 lb 12.8 oz)   SpO2 92%   BMI 31.87 kg/m²   HEENT: non-icteric, no JVD  CV: S1, S2 without m/r/g; no LE edema  RESP: CTA B without w/r/r; breathing wnl  ABD: +bs, soft, nt, no hsm  SKIN: warm, no rashes  ACCESS: Left IJ TDC    Data/  Recent Labs     07/15/24  0928 07/16/24  0522 07/17/24  1020   WBC 7.0 7.0 6.2   HGB 8.5* 8.5* 8.3*   HCT 27.3* 26.6* 26.7*   MCV 84.9 85.3 85.0    263 260       Recent Labs     07/15/24  0928 07/16/24  0522 07/17/24  1020   * 134* 132*   K 5.9* 6.1* 5.6*   CL 97* 99 96*   CO2 23 24 28   GLUCOSE 124* 142* 128*   PHOS  --  2.7  --    MG  --   --  2.00   BUN 67* 82* 65*   CREATININE 6.8* 8.1* 6.1*   LABGLOM 9* 7* 10*         Assessment/     - ESKD on HD Tuesday Thursday Saturday at [facility]    -Hyperkalemia    -Anemia    -CHF with reduced EF with bilateral pleural effusions   Unsuccessful thoracentesis on 7/15    -CAD/Chest pain                Angiogram with significant CAD, CT surgery has been consulted and planning for CABG      Plan/     - HD per TTS schedule, UF as tolerated, EDW 93 kg  - Repeat Lokelma 10 grams X1  - Retacrit 10K qHD  - Trend labs, bp's, weights    ____________________________________  Ricky Carlos MD  [facility]  [email or web address]  Office: [phone]

## 2024-07-17 NOTE — PROGRESS NOTES
07/17/24 0337   NIV Type   NIV Started/Stopped On   Equipment Type V60   Mode Bilevel   Mask Type Full face mask   Mask Size Medium   Assessment   Respirations 21   SpO2 98 %   Comfort Level Good   Using Accessory Muscles No   Mask Compliance Good   Skin Assessment Clean, dry, & intact   Skin Protection for O2 Device No   Settings/Measurements   IPAP 12 cmH20   CPAP/EPAP 6 cmH2O   Vt (Measured) 866 mL   Rate Ordered 12   FiO2  30 %   Minute Volume (L/min) 18 Liters   Mask Leak (lpm) 16 lpm   Patient's Home Machine No   Alarm Settings   Alarms On Y

## 2024-07-17 NOTE — PROGRESS NOTES
CVTS Thoracic Progress Note:          CC: severe multivessel CAD    Subj: Pt sleeping in bed comfortably. Easily awoke and told patient we would discuss his case at an interdisciplinary meeting tomorrow AM. He is understanding and agreeable.    Obj:    Blood pressure (!) 140/80, pulse 81, temperature 98.3 °F (36.8 °C), temperature source Axillary, resp. rate 18, height 1.753 m (5' 9\"), weight 97.9 kg (215 lb 12.8 oz), SpO2 100 %.   Lungs: NWOB on BIPAP.  Abdomen: Soft and non-tender     Diagnostics:   PT/INR:    Lab Results   Component Value Date/Time    PROTIME 14.0 07/15/2024 09:28 AM    INR 1.06 07/15/2024 09:28 AM     Troponin:    Lab Results   Component Value Date/Time    TROPONINI 0.11 04/18/2023 01:27 AM     Recent Labs     07/14/24  1047 07/15/24  0928 07/16/24  0522   WBC 7.6 7.0 7.0   HGB 8.8* 8.5* 8.5*   HCT 27.5* 27.3* 26.6*    293 263                                                                    Recent Labs     07/14/24  1047 07/15/24  0928 07/16/24  0522   * 133* 134*   K 5.2* 5.9* 6.1*   CL 98* 97* 99   CO2 26 23 24   BUN 53* 67* 82*   CREATININE 5.7* 6.8* 8.1*   GLUCOSE 100* 124* 142*   PHOS  --   --  2.7            Recent Labs     07/14/24  1047   MG 1.90        Echo Findings:  Left Ventricle Moderately reduced left ventricular systolic function with a visually estimated EF of 35 - 40%. EF by 2D Simpsons Biplane is 41%. Left ventricle is moderately dilated. Mildly increased wall thickness. Findings consistent with mild concentric hypertrophy.  Difficult to fully evaluate LV function due to poor endocardial visualization, if appears to be more reduced on original images, images with contrast show improved function. Global hypokinesis present  With regional variation with predominantly apical hypokinesis Grade II diastolic dysfunction with increased LAP.   Left Atrium Left atrium is moderately dilated.   Right Ventricle Right ventricle size is normal. Prominent and calcified chordae  noted. Normal systolic function. TAPSE is 2.0 cm. RV Peak S' is 10 cm/s.   Right Atrium Catheter present in the right atrium. Right atrium size is normal.   Aortic Valve Langford Leyda 3 appropriately positioned transcatheter bioprosthetic valve with a size of 29 mm. AV mean gradient is 3 mmHg.  Annulus is thickened No paravalvular regurgitation. Normal prosthetic gradient.   Mitral Valve Annular calcification. Calcified leaflets. No regurgitation. Trace stenosis noted.   Tricuspid Valve Valve structure is normal. Trace regurgitation. No stenosis noted.   Pulmonic Valve The pulmonic valve was not well visualized. No regurgitation. No stenosis noted.   Aorta Normal sized aortic root and ascending aorta.   IVC/Hepatic Veins IVC was not assessed due to poor image quality.   Pericardium Small (<1 cm) localized pericardial effusion present around the lateral and left ventricle. No indication of cardiac tamponade.      Assess/Plan:   Care per primary team.     Severe multivessel CAD:   - After discussion between cardiology, our team, & the patient, we will defer definitive intervention until patient case is discussed at the heart team meeting on 7/18. Will defer definitive intervention from CT surgery at this time. Patient is agreeable & understanding.    Lou Bermudez PA-C  7/17/2024  7:33 AM

## 2024-07-17 NOTE — PROGRESS NOTES
Writer spoke to CT surgeon, Dr. Luis Watts, regarding plan of care for patient. Per Dr. Watts, patient will no longer be having CABG tomorrow and pre-op orders can be discontinued. Tentative plan for possible high-risk cardiac cath per provider, care team to re-evaluate tomorrow.     Charge nurse and patient made aware. All questions answered, patient agreeable at this time and denies any further needs.

## 2024-07-17 NOTE — PROGRESS NOTES
07/17/24 1218   NIV Type   Equipment Type V60   Mode Bilevel   Assessment   Pulse 78   Heart Rate Source Monitor   Respirations 26   SpO2 99 %   Comfort Level Good   Using Accessory Muscles No   Mask Compliance Good   Skin Assessment Redness (see comment/note)   Skin Protection for O2 Device No   Breath Sounds   Right Upper Lobe Diminished   Right Middle Lobe Diminished   Right Lower Lobe Diminished   Left Upper Lobe Diminished   Left Lower Lobe Diminished   Settings/Measurements   PIP Observed 13 cm H20   IPAP 12 cmH20   CPAP/EPAP 6 cmH2O   Vt (Measured) 339 mL   Rate Ordered 12   FiO2  30 %   I Time/ I Time % 1 s   Minute Volume (L/min) 11.4 Liters   Mask Leak (lpm) 11 lpm   Patient's Home Machine No   Alarm Settings   Alarms On Y   Low Pressure (cmH2O) 6 cmH2O   High Pressure (cmH2O) 30 cmH2O   Apnea (secs) 20 secs   RR Low (bpm) 6   RR High (bpm) 40 br/min   Oxygen Therapy/Pulse Ox   O2 Therapy Oxygen   O2 Device PAP (positive airway pressure)

## 2024-07-17 NOTE — PROGRESS NOTES
V2.0    [facility] Progress Note      Name:  Igor Ann /Age/Sex: 1970  (54 y.o. male)   MRN & CSN:  [id number] & [id number] Encounter Date/Time: 2024 9:14 AM EDT   Location:  [id number]- PCP: Vonnie Cleveland APRN - NP     Attending:Ho Neff DO       Hospital Day: 9    Assessment and Recommendations   Igor Ann is a 54 y.o. male with pmh of COPD, CAD status post 3 stents, ESRD on dialysis, GERD, heart failure, type 2 diabetes, and ZAINAB who presents with Coronary artery disease, unspecified vessel or lesion type, unspecified whether angina present, unspecified whether native or transplanted heart        Plan:   Chest pain/CAD status post 3 stents   -Ramus, left circumflex, right coronary artery stent placed in   -Troponins flat trajectory, EKG with T wave inversions new compared to previous  -Cardiology consulted  -Cath cath showed restenosis of the ramus, left circumflex, right coronary artery  -Patient received 3 additional stents  in the subsequent arteries listed above  -Continue aspirin 81  -Continue pravastatin 40 mg  -Holding Plavix by CT surgery anticipation for CABG  -Eliquis currently on hold  -new echo showed EF 35-40%  -CT surgery consulted and prepping for CABG   -CABG canceled   -Cardiology planning for high risk PCI in future    ESRD on dialysis  -Hemodialysis on ,  -Nephrology has been consulted    Heart failure reduced EF  -Previous echo showed 45 to 50%  --new echo showed EF 35-40%  -Continue Entresto  -Continue metoprolol  -CHF order set in place    Type 2 diabetes  Continue 15 units nightly  Low-dose sliding scale    GERD  -EGD negative  -Will continue pantoprazole 40 mg twice daily    Chronic normocytic anemia  Continue monitor CBC  No signs of bleeding, holding Eliquis  Secondary to CKD  Patient gets Retacrit    Chronic osteomyelitis  Status post foot I&D with MT bone resection  Patient finished antibiotics and was following with

## 2024-07-17 NOTE — PROGRESS NOTES
07/16/24 9326   NIV Type   NIV Started/Stopped On   Equipment Type V60   Mode Bilevel   Mask Type Full face mask   Mask Size Medium   Assessment   Respirations 25   SpO2 98 %   Comfort Level Good   Using Accessory Muscles No   Mask Compliance Good   Skin Assessment Clean, dry, & intact   Skin Protection for O2 Device No   Settings/Measurements   IPAP 12 cmH20   CPAP/EPAP 6 cmH2O   Vt (Measured) 466 mL   Rate Ordered 12   FiO2  30 %   Minute Volume (L/min) 14.2 Liters   Mask Leak (lpm) 0 lpm   Patient's Home Machine No   Alarm Settings   Alarms On Y

## 2024-07-17 NOTE — CONSULTS
[facility]  HEART FAILURE PROGRAM      Igor SANCHEZ Marissa 1970    History:  Past Medical History:   Diagnosis Date    Ambulatory dysfunction     walker for long distances, SOB with distance    Aortic stenosis     echo 2017    Arthritis     hands and hips    Asthma     Bilateral hilar adenopathy syndrome 06/03/2013    CAD (coronary artery disease)     Dr. Javier Chanel Heart    Cardiomyopathy ([facility]) 04/19/2019    EF= 43%    CHF, EF 35-40% (July 2024)     Chronic pain     COPD (chronic obstructive pulmonary disease) ([facility])     pulmonology Dr. Batista    CVA (cerebral vascular accident) ([facility]) 05/21/2017    Depression     Diabetes mellitus ([facility])     borderline    Difficult intravenous access     Emphysema of lung ([facility])     ESRD (end stage renal disease) on dialysis ([facility])     MWF    Fear of needles     Gastric ulcer     GERD (gastroesophageal reflux disease)     HD, T/R/Sa     Heart valve problem     bicuspic valve    History of spinal fracture     work incident    History of transcatheter aortic valve replacement (TAVR)     reported by wife    Hx of blood clots     Bilateral lower extremities; stents in place    Hyperlipidemia     Hypertension     MI (myocardial infarction) ([facility]) 2019    has had 9 MIs. 2019 was the last    Neuromuscular disorder ([facility])     due to CVA    Numbness and tingling in left arm     from fistula    Pneumonia     PONV (postoperative nausea and vomiting)     Prolonged emergence from general anesthesia     States requires more medication than most people    Sleep apnea     Uses CPAP    Stroke ([facility])     7mm thalamic cva 2017 deficts left side, left side weakness    TIA (transient ischemic attack)     Unspecified diseases of blood and blood-forming organs        ECHO:  7/5/24   EF 45-50%  HgA1C:  7/4/24  6.0  Iron Saturation:  7/9/24   10  Ferritin: 1/12/22   624.0    ACE/ARB/ARNI: ESRD  BB: Toprol XL 25 mg daily   Spironolactone:  Imdur 30 mg daily  SGLT2:    [facility]

## 2024-07-17 NOTE — PROGRESS NOTES
07/17/24 1548   NIV Type   NIV Started/Stopped On   Equipment Type v60   Mode Bilevel   Mask Type Full face mask   Mask Size Medium   Assessment   Pulse 70   Respirations 16   SpO2 99 %   Using Accessory Muscles No   Mask Compliance Good   Skin Assessment Redness (see comment/note)   Skin Protection for O2 Device No   Settings/Measurements   PIP Observed 12 cm H20   IPAP 12 cmH20   CPAP/EPAP 6 cmH2O   Vt (Measured) 472 mL   Rate Ordered 12   FiO2  30 %   I Time/ I Time % 1 s   Minute Volume (L/min) 7.7 Liters   Mask Leak (lpm) 7 lpm   Patient's Home Machine No   Alarm Settings   Alarms On Y   Low Pressure (cmH2O) 6 cmH2O   High Pressure (cmH2O) 30 cmH2O   Delay Alarm 20 sec(s)   Apnea (secs) 20 secs   RR Low (bpm) 6   RR High (bpm) 40 br/min

## 2024-07-18 LAB
ALBUMIN SERPL-MCNC: 3.4 G/DL (ref 3.4–5)
ANION GAP SERPL CALCULATED.3IONS-SCNC: 11 MMOL/L (ref 3–16)
BUN SERPL-MCNC: 79 MG/DL (ref 7–20)
CALCIUM SERPL-MCNC: 10.2 MG/DL (ref 8.3–10.6)
CHLORIDE SERPL-SCNC: 98 MMOL/L (ref 99–110)
CO2 SERPL-SCNC: 24 MMOL/L (ref 21–32)
CREAT SERPL-MCNC: 7.3 MG/DL (ref 0.9–1.3)
DEPRECATED RDW RBC AUTO: 18 % (ref 12.4–15.4)
EST. AVERAGE GLUCOSE BLD GHB EST-MCNC: 125.5 MG/DL
GFR SERPLBLD CREATININE-BSD FMLA CKD-EPI: 8 ML/MIN/{1.73_M2}
GLUCOSE BLD-MCNC: 110 MG/DL (ref 70–99)
GLUCOSE BLD-MCNC: 186 MG/DL (ref 70–99)
GLUCOSE BLD-MCNC: 205 MG/DL (ref 70–99)
GLUCOSE BLD-MCNC: 93 MG/DL (ref 70–99)
GLUCOSE SERPL-MCNC: 85 MG/DL (ref 70–99)
HBA1C MFR BLD: 6 %
HCT VFR BLD AUTO: 27.4 % (ref 40.5–52.5)
HGB BLD-MCNC: 8.7 G/DL (ref 13.5–17.5)
MCH RBC QN AUTO: 26.7 PG (ref 26–34)
MCHC RBC AUTO-ENTMCNC: 31.6 G/DL (ref 31–36)
MCV RBC AUTO: 84.7 FL (ref 80–100)
PERFORMED ON: ABNORMAL
PERFORMED ON: NORMAL
PHOSPHATE SERPL-MCNC: 3.5 MG/DL (ref 2.5–4.9)
PLATELET # BLD AUTO: 283 K/UL (ref 135–450)
PMV BLD AUTO: 7.4 FL (ref 5–10.5)
POTASSIUM SERPL-SCNC: 6.3 MMOL/L (ref 3.5–5.1)
RBC # BLD AUTO: 3.24 M/UL (ref 4.2–5.9)
SODIUM SERPL-SCNC: 133 MMOL/L (ref 136–145)
WBC # BLD AUTO: 8 K/UL (ref 4–11)

## 2024-07-18 PROCEDURE — 2580000003 HC RX 258

## 2024-07-18 PROCEDURE — 6370000000 HC RX 637 (ALT 250 FOR IP)

## 2024-07-18 PROCEDURE — 90935 HEMODIALYSIS ONE EVALUATION: CPT

## 2024-07-18 PROCEDURE — 80069 RENAL FUNCTION PANEL: CPT

## 2024-07-18 PROCEDURE — 6370000000 HC RX 637 (ALT 250 FOR IP): Performed by: NURSE PRACTITIONER

## 2024-07-18 PROCEDURE — 2500000003 HC RX 250 WO HCPCS: Performed by: NURSE PRACTITIONER

## 2024-07-18 PROCEDURE — 2060000000 HC ICU INTERMEDIATE R&B

## 2024-07-18 PROCEDURE — 94010 BREATHING CAPACITY TEST: CPT | Performed by: INTERNAL MEDICINE

## 2024-07-18 PROCEDURE — 2700000000 HC OXYGEN THERAPY PER DAY

## 2024-07-18 PROCEDURE — 85027 COMPLETE CBC AUTOMATED: CPT

## 2024-07-18 PROCEDURE — 6370000000 HC RX 637 (ALT 250 FOR IP): Performed by: INTERNAL MEDICINE

## 2024-07-18 PROCEDURE — 94761 N-INVAS EAR/PLS OXIMETRY MLT: CPT

## 2024-07-18 PROCEDURE — 94660 CPAP INITIATION&MGMT: CPT

## 2024-07-18 PROCEDURE — 6360000002 HC RX W HCPCS: Performed by: STUDENT IN AN ORGANIZED HEALTH CARE EDUCATION/TRAINING PROGRAM

## 2024-07-18 RX ORDER — HEPARIN SODIUM 1000 [USP'U]/ML
INJECTION, SOLUTION INTRAVENOUS; SUBCUTANEOUS
Status: DISPENSED
Start: 2024-07-18 | End: 2024-07-19

## 2024-07-18 RX ADMIN — SODIUM CHLORIDE, PRESERVATIVE FREE 10 ML: 5 INJECTION INTRAVENOUS at 21:57

## 2024-07-18 RX ADMIN — CALCIUM ACETATE 1334 MG: 667 CAPSULE ORAL at 14:20

## 2024-07-18 RX ADMIN — DULOXETINE HYDROCHLORIDE 60 MG: 60 CAPSULE, DELAYED RELEASE ORAL at 09:32

## 2024-07-18 RX ADMIN — BUSPIRONE HYDROCHLORIDE 10 MG: 5 TABLET ORAL at 21:43

## 2024-07-18 RX ADMIN — DOCUSATE SODIUM 100 MG: 100 CAPSULE, LIQUID FILLED ORAL at 09:32

## 2024-07-18 RX ADMIN — TRAZODONE HYDROCHLORIDE 50 MG: 50 TABLET ORAL at 21:43

## 2024-07-18 RX ADMIN — PANTOPRAZOLE SODIUM 40 MG: 40 TABLET, DELAYED RELEASE ORAL at 05:17

## 2024-07-18 RX ADMIN — QUETIAPINE FUMARATE 50 MG: 25 TABLET ORAL at 21:43

## 2024-07-18 RX ADMIN — HEPARIN SODIUM 5000 UNITS: 5000 INJECTION INTRAVENOUS; SUBCUTANEOUS at 21:43

## 2024-07-18 RX ADMIN — ASPIRIN 81 MG 81 MG: 81 TABLET ORAL at 09:33

## 2024-07-18 RX ADMIN — PRAVASTATIN SODIUM 40 MG: 40 TABLET ORAL at 09:31

## 2024-07-18 RX ADMIN — BUSPIRONE HYDROCHLORIDE 10 MG: 5 TABLET ORAL at 09:32

## 2024-07-18 RX ADMIN — OXYCODONE 10 MG: 5 TABLET ORAL at 14:21

## 2024-07-18 RX ADMIN — OXYCODONE 10 MG: 5 TABLET ORAL at 21:43

## 2024-07-18 RX ADMIN — INSULIN GLARGINE 15 UNITS: 100 INJECTION, SOLUTION SUBCUTANEOUS at 21:44

## 2024-07-18 RX ADMIN — BUSPIRONE HYDROCHLORIDE 10 MG: 5 TABLET ORAL at 14:20

## 2024-07-18 RX ADMIN — CALCIUM ACETATE 1334 MG: 667 CAPSULE ORAL at 21:43

## 2024-07-18 RX ADMIN — CALCIUM ACETATE 1334 MG: 667 CAPSULE ORAL at 09:31

## 2024-07-18 RX ADMIN — HEPARIN SODIUM 5000 UNITS: 5000 INJECTION INTRAVENOUS; SUBCUTANEOUS at 14:19

## 2024-07-18 RX ADMIN — HEPARIN SODIUM 5000 UNITS: 5000 INJECTION INTRAVENOUS; SUBCUTANEOUS at 05:17

## 2024-07-18 RX ADMIN — ISOSORBIDE MONONITRATE 30 MG: 30 TABLET, EXTENDED RELEASE ORAL at 09:31

## 2024-07-18 RX ADMIN — VENLAFAXINE HYDROCHLORIDE 75 MG: 37.5 CAPSULE, EXTENDED RELEASE ORAL at 09:32

## 2024-07-18 RX ADMIN — PREGABALIN 25 MG: 25 CAPSULE ORAL at 05:17

## 2024-07-18 RX ADMIN — METOPROLOL SUCCINATE 25 MG: 25 TABLET, EXTENDED RELEASE ORAL at 09:33

## 2024-07-18 RX ADMIN — PANTOPRAZOLE SODIUM 40 MG: 40 TABLET, DELAYED RELEASE ORAL at 14:20

## 2024-07-18 RX ADMIN — OXYCODONE 10 MG: 5 TABLET ORAL at 09:48

## 2024-07-18 ASSESSMENT — PAIN DESCRIPTION - ORIENTATION
ORIENTATION: MID;LOWER
ORIENTATION: LOWER

## 2024-07-18 ASSESSMENT — PAIN SCALES - GENERAL
PAINLEVEL_OUTOF10: 9
PAINLEVEL_OUTOF10: 9
PAINLEVEL_OUTOF10: 8
PAINLEVEL_OUTOF10: 8

## 2024-07-18 ASSESSMENT — PAIN DESCRIPTION - LOCATION
LOCATION: BACK;FOOT;HIP
LOCATION: BACK
LOCATION: BACK

## 2024-07-18 ASSESSMENT — PAIN DESCRIPTION - DESCRIPTORS
DESCRIPTORS: ACHING
DESCRIPTORS: ACHING

## 2024-07-18 NOTE — PLAN OF CARE
CHF Care Plan      Patient's EF (Ejection Fraction) is less than 40%    Heart Failure Medications:  Diuretics:: None    (One of the following REQUIRED for EF </= 40%/SYSTOLIC FAILURE but MAY be used in EF% >40%/DIASTOLIC FAILURE)        ACE:: None        ARB:: None         ARNI:: None    (Beta Blockers)  NON- Evidenced Based Beta Blocker (for EF% >40%/DIASTOLIC FAILURE): None    Evidenced Based Beta Blocker::(REQUIRED for EF% <40%/SYSTOLIC FAILURE) Metoprolol SUCCinate- Toprol XL  ...................................................................................................................................................    Failed to redirect to the Timeline version of the [employer] SmartLink.      Patient's weights and intake/output reviewed    Daily Weight log at bedside, patient/family participation in use of log: \"yes    Patient's current weight today shows a difference of 0 lbs  than last documented weight.      Intake/Output Summary (Last 24 hours) at 7/18/2024 1721  Last data filed at 7/18/2024 1413  Gross per 24 hour   Intake 1109 ml   Output 100 ml   Net 1009 ml       Education Booklet Provided: yes    Comorbidities Reviewed Yes    Patient has a past medical history of Ambulatory dysfunction, Aortic stenosis, Arthritis, Asthma, Bilateral hilar adenopathy syndrome, CAD (coronary artery disease), Cardiomyopathy ([facility]), CHF, EF 35-40% (July 2024), Chronic pain, COPD (chronic obstructive pulmonary disease) ([facility]), CVA (cerebral vascular accident) ([facility]), Depression, Diabetes mellitus ([facility]), Difficult intravenous access, Emphysema of lung ([facility]), ESRD (end stage renal disease) on dialysis ([facility]), Fear of needles, Gastric ulcer, GERD (gastroesophageal reflux disease), HD, T/R/Sa, Heart valve problem, History of spinal fracture, History of transcatheter aortic valve replacement (TAVR), Hx of blood clots, Hyperlipidemia, Hypertension, MI (myocardial infarction) (HCC), Neuromuscular disorder (HCC), Numbness and tingling  in left arm, Pneumonia, PONV (postoperative nausea and vomiting), Prolonged emergence from general anesthesia, Sleep apnea, Stroke (HCC), TIA (transient ischemic attack), and Unspecified diseases of blood and blood-forming organs.     >>For CHF and Comorbidity documentation on Education Time and Topics, please see Education Tab      Pt sitting in bed at this time on room air. Pt denies shortness of breath. Pt without lower extremity edema.     Patient and/or Family's stated Goal of Care this Admission: reduce shortness of breath, increase activity tolerance, better understand heart failure and disease management, be more comfortable, and reduce lower extremity edema prior to discharge        :

## 2024-07-18 NOTE — PROGRESS NOTES
Treatment time: 3.5 hours  Net UF: 4000 ml     Pre weight: 110.8 kg  Post weight:106.8 kg  EDW: 93 kg    Access used: r tunneled cvc    Access function: good with  ml/min     Medications or blood products given: retacrit     Regular outpatient schedule: tts     Summary of response to treatment: Patient tolerated treatment well and without any complications. Patient remained stable throughout entire treatment and upon the exiting the dialysis suite via transport.     Report given to NAVJOT Hanks and copy of dialysis treatment record placed in chart, to be scanned into EMR.

## 2024-07-18 NOTE — PROGRESS NOTES
[facility]   Daily Cardiovascular Progress Note    Admit Date: 7/9/2024    Chief complaint: sob  HPI:     Pt denies CP, SOB, dizziness or syncope.      Medications/Labs all Reviewed:  Patient Active Problem List   Diagnosis    Sepsis without acute organ dysfunction ([facility])    CHF (congestive heart failure) ([facility])    Chronic obstructive pulmonary disease ([facility])    Coronary artery disease involving native coronary artery of native heart    PVD (peripheral vascular disease) ([facility])    Bicuspid aortic valve    Bilateral hilar adenopathy syndrome    Claudication in peripheral vascular disease ([facility])    Hypertension    Diabetic neuropathy ([facility])    Type 2 DM with hypertension and ESRD on dialysis ([facility])    Passive smoke exposure    Depression with anxiety    Community acquired pneumonia of left lower lobe of lung    Obesity    ZAINAB on CPAP    Degeneration of lumbar or lumbosacral intervertebral disc    Lumbar radiculopathy    Lumbosacral spondylosis without myelopathy    Biliary colic    Symptomatic cholelithiasis    Gastroparesis due to DM ([facility])    Elevated troponin    Angina, class IV ([facility])    Dyspnea    Dyslipidemia    Chronic systolic CHF (congestive heart failure) ([facility])    Ischemic cardiomyopathy    Tobacco abuse    CVA (cerebral vascular accident) ([facility])    History of CVA (cerebrovascular accident)    Type 2 diabetes mellitus without complication, without long-term current use of insulin ([facility])    ZAINAB treated with BiPAP    Pleural effusion    Chronic anemia    Nonrheumatic aortic valve stenosis    Mucus plugging of bronchi    Hemodialysis-associated hypotension    ESRD on dialysis ([facility])    Hypotension due to drugs    Acute diastolic CHF (congestive heart failure) ([facility])    Neuromuscular disorder ([facility])    Renovascular hypertension    Mixed hyperlipidemia    Cigarette nicotine dependence in remission    Pulmonary edema    Fluid overload    Anemia of chronic disease    SOB (shortness of breath) on exertion    Steal  syndrome of dialysis vascular access (HCC)    Chronic, continuous use of opioids    Chronic bronchitis ([facility])    Nasal congestion    Hypercholesteremia    Bradycardia    S/P TAVR (transcatheter aortic valve replacement)    Syncope and collapse    PAF (paroxysmal atrial fibrillation) ([facility])    Bilateral leg weakness    GBS (Guillain-Staatsburg syndrome) ([facility])    Sinus pause    Weakness of both lower extremities    Sinus bradycardia    Ataxia    Peripheral vascular occlusive disease ([facility])    Cellulitis of right foot    Iliac artery occlusion, 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[facility])    Cellulitis and abscess of hand    Uncontrolled type 2 diabetes mellitus with hyperglycemia ([facility])    Acute encephalopathy    Acute hypoxemic respiratory failure ([facility])    Acute CVA (cerebrovascular accident) ([facility])    Speech problem    Urinary tract infection with hematuria    Respiratory failure with hypoxia ([facility])    Acute respiratory failure with hypercapnia ([facility])    Acute pulmonary edema ([facility])    Grade II diastolic dysfunction    Shock circulatory ([facility])    Smoker    Normocytic normochromic anemia    NSTEMI (non-ST elevated myocardial infarction) ([facility])    SIRS (systemic inflammatory response syndrome) ([facility])    Atrial flutter ([facility])    Liberty-rectal abscess    Somnolence    Pulmonary edema with diastolic CHF, NYHA class 3 ([facility])    Respiratory failure ([facility])    Former smoker    Cardiomyopathy ([facility])    Morbid obesity with BMI of 40.0-44.9, adult ([facility])    Hypoglycemia    Altered mental status    Hypertensive emergency    Dyspnea and respiratory abnormalities    Acute respiratory failure with hypoxia and hypercapnia ([facility])    HFrEF (heart failure with reduced ejection fraction) ([facility])    Pericardial effusion    Hypervolemia    Pneumonia of left lower lobe due to infectious organism    Acute on chronic respiratory failure with hypercapnia ([facility])    Compression atelectasis    Type 2 myocardial infarction ([facility])    Acute respiratory failure with hypoxemia ([facility])    Hyperkalemia     severity (HCC)    Symptomatic bradycardia    Pulmonary HTN (HCC)    Aortic valve disease    Cardiogenic shock (HCC)    Gangrene of right foot (HCC)    Wound infection    ESRD (end stage renal disease) (HCC)    Acute respiratory failure with hypoxia (HCC)    Volume overload    Sepsis (HCC)    Bimalleolar fracture of right ankle, closed, initial encounter    High anion gap metabolic acidosis    Acute on chronic left systolic heart failure (HCC)    Anxiety    Acute on chronic respiratory failure (HCC)    Non-compliance    Diabetic ketoacidosis without coma associated with diabetes mellitus due to underlying condition (HCC)    Abnormal nuclear cardiac imaging test    ZAINAB (obstructive sleep apnea)    Acute systolic CHF (congestive heart failure) (HCC)    Foot ulcer with necrosis of bone, left (HCC)    Right foot ulcer, with necrosis of bone (HCC)    Foot infection    Chest pain    Dark emesis    Coronary artery disease, unspecified vessel or lesion type, unspecified whether angina present, unspecified whether native or transplanted heart    Abnormal stress test    CAD (coronary artery disease)     Elevated troponin, chest pain, possible non-STEMI, abnormal stress test, status post heart catheterization, multivessel disease noted, patient evaluated heart team conference, it was felt that patient may benefit from partial revascularization of the RCA territory however patient is not interested in that as he recognizes that PCI is not likely durable for him, he understands that he is in a high risk situation, he is interested in second opinion for possible open heart surgery/CABG, he prefers [facility] for that evaluation/second opinion.    CAD/ASHD, status post multivessel PCI, continue aspirin, Plavix on hold for CABG evaluation     Hypercholesterolemia, continue statin     Hypertension, ischemic heart disease/cardiomyopathy, continue beta-blocker      ESRD, as per nephrology    Patient is requesting hospital

## 2024-07-18 NOTE — CARE COORDINATION
Per nursing, Cardiology planning xfr to [facility] for second opinion. RN CM will sign off at this time. Please consult if needs arise.

## 2024-07-18 NOTE — PROGRESS NOTES
CVTS Thoracic Progress Note:          CC: severe multivessel CAD    Subj: Had lengthy discussion with patient regarding his options and he is upset surgery is no longer being offered. He had many questions. Denying active chest pain at this time.    Obj:    Blood pressure (!) 173/83, pulse 83, temperature 98.9 °F (37.2 °C), temperature source Axillary, resp. rate 18, height 1.753 m (5' 9\"), weight 100.8 kg (222 lb 3.6 oz), SpO2 99 %.   General: Sitting at side of bed eating breakfast.  Lungs: NWOB on nasal cannula.  Abdomen: Appears soft and non-tender     Diagnostics:   PT/INR:    Lab Results   Component Value Date/Time    PROTIME 14.2 07/17/2024 10:20 AM    INR 1.08 07/17/2024 10:20 AM     Troponin:    Lab Results   Component Value Date/Time    TROPONINI 0.11 04/18/2023 01:27 AM     Recent Labs     07/16/24  0522 07/17/24  1020 07/18/24  0514   WBC 7.0 6.2 8.0   HGB 8.5* 8.3* 8.7*   HCT 26.6* 26.7* 27.4*    260 283                                                                    Recent Labs     07/16/24  0522 07/17/24  1020 07/18/24  0514   * 132* 133*   K 6.1* 5.6* 6.3*   CL 99 96* 98*   CO2 24 28 24   BUN 82* 65* 79*   CREATININE 8.1* 6.1* 7.3*   GLUCOSE 142* 128* 85   PHOS 2.7  --  3.5            Recent Labs     07/17/24  1020   MG 2.00        Echo Findings:  Left Ventricle Moderately reduced left ventricular systolic function with a visually estimated EF of 35 - 40%. EF by 2D Simpsons Biplane is 41%. Left ventricle is moderately dilated. Mildly increased wall thickness. Findings consistent with mild concentric hypertrophy.  Difficult to fully evaluate LV function due to poor endocardial visualization, if appears to be more reduced on original images, images with contrast show improved function. Global hypokinesis present  With regional variation with predominantly apical hypokinesis Grade II diastolic dysfunction with increased LAP.   Left Atrium Left atrium is moderately dilated.   Right

## 2024-07-18 NOTE — PLAN OF CARE
CHF Care Plan      Patient's EF (Ejection Fraction) is less than 40%    Heart Failure Medications:  Diuretics:: None    (One of the following REQUIRED for EF </= 40%/SYSTOLIC FAILURE but MAY be used in EF% >40%/DIASTOLIC FAILURE)        ACE:: None        ARB:: None         ARNI:: None    (Beta Blockers)  NON- Evidenced Based Beta Blocker (for EF% >40%/DIASTOLIC FAILURE): None    Evidenced Based Beta Blocker::(REQUIRED for EF% <40%/SYSTOLIC FAILURE) Metoprolol SUCCinate- Toprol XL  ...................................................................................................................................................    Failed to redirect to the Timeline version of the [employer] SmartLink.      Patient's weights and intake/output reviewed    Daily Weight log at bedside, patient/family participation in use of log: \"yes    Patient's current weight today shows a difference of 3 lbs more than last documented weight.      Intake/Output Summary (Last 24 hours) at 7/18/2024 0539  Last data filed at 7/17/2024 2056  Gross per 24 hour   Intake 465 ml   Output --   Net 465 ml       Education Booklet Provided: yes    Comorbidities Reviewed Yes    Patient has a past medical history of Ambulatory dysfunction, Aortic stenosis, Arthritis, Asthma, Bilateral hilar adenopathy syndrome, CAD (coronary artery disease), Cardiomyopathy ([facility]), CHF, EF 35-40% (July 2024), Chronic pain, COPD (chronic obstructive pulmonary disease) ([facility]), CVA (cerebral vascular accident) ([facility]), Depression, Diabetes mellitus ([facility]), Difficult intravenous access, Emphysema of lung ([facility]), ESRD (end stage renal disease) on dialysis ([facility]), Fear of needles, Gastric ulcer, GERD (gastroesophageal reflux disease), HD, T/R/Sa, Heart valve problem, History of spinal fracture, History of transcatheter aortic valve replacement (TAVR), Hx of blood clots, Hyperlipidemia, Hypertension, MI (myocardial infarction) (HCC), Neuromuscular disorder (HCC), Numbness and tingling

## 2024-07-18 NOTE — PROGRESS NOTES
07/18/24 0005   NIV Type   $NIV $Daily Charge   NIV Started/Stopped On   Equipment Type v60   Mode Bilevel   Mask Type Full face mask   Mask Size Medium   Assessment   Comfort Level Good   Using Accessory Muscles No   Mask Compliance Good   Skin Assessment Clean, dry, & intact   Skin Protection for O2 Device No   Settings/Measurements   PIP Observed 12 cm H20   IPAP 12 cmH20   CPAP/EPAP 6 cmH2O   Vt (Measured) 479 mL   Rate Ordered 12   Insp Rise Time (%) 1 %   FiO2  30 %   I Time/ I Time % 1 s   Minute Volume (L/min) 7.9 Liters   Mask Leak (lpm) 3 lpm   Patient's Home Machine No   Alarm Settings   Alarms On Y   Low Pressure (cmH2O) 6 cmH2O   High Pressure (cmH2O) 30 cmH2O   Apnea (secs) 20 secs   RR Low (bpm) 6   RR High (bpm) 40 br/min

## 2024-07-18 NOTE — PLAN OF CARE
Problem: Safety - Adult  Goal: Free from fall injury  Outcome: Progressing     Problem: Chronic Conditions and Co-morbidities  Goal: Patient's chronic conditions and co-morbidity symptoms are monitored and maintained or improved  Outcome: Progressing     Problem: Discharge Planning  Goal: Discharge to home or other facility with appropriate resources  Outcome: Progressing     Problem: Skin/Tissue Integrity  Goal: Absence of new skin breakdown  Description: 1.  Monitor for areas of redness and/or skin breakdown  2.  Assess vascular access sites hourly  3.  Every 4-6 hours minimum:  Change oxygen saturation probe site  4.  Every 4-6 hours:  If on nasal continuous positive airway pressure, respiratory therapy assess nares and determine need for appliance change or resting period.  Outcome: Progressing     Problem: Pain  Goal: Verbalizes/displays adequate comfort level or baseline comfort level  Outcome: Progressing     Problem: Nutrition Deficit:  Goal: Optimize nutritional status  Outcome: Progressing

## 2024-07-18 NOTE — PROGRESS NOTES
07/18/24 0352   NIV Type   NIV Started/Stopped On   Equipment Type v60   Mode Bilevel   Mask Type Full face mask   Mask Size Medium   Assessment   Comfort Level Good   Using Accessory Muscles No   Mask Compliance Good   Settings/Measurements   PIP Observed 12 cm H20   IPAP 12 cmH20   CPAP/EPAP 6 cmH2O   Vt (Measured) 496 mL   Rate Ordered 12   Insp Rise Time (%) 1 %   FiO2  30 %   I Time/ I Time % 1 s   Minute Volume (L/min) 7.5 Liters   Mask Leak (lpm) 3 lpm   Patient's Home Machine No   Alarm Settings   Alarms On Y   Low Pressure (cmH2O) 6 cmH2O   High Pressure (cmH2O) 30 cmH2O   Apnea (secs) 20 secs   RR Low (bpm) 6   RR High (bpm) 40 br/min

## 2024-07-18 NOTE — PROCEDURES
[address] [address]                           PULMONARY FUNCTION      PATIENT NAME: ALLEN HARDWICK            : 1970  MED REC NO: [id number]                      ROOM: 0440  ACCOUNT NO: [id number]                       ADMIT DATE: 2024  PROVIDER: Jt Esquivel MD      DATE OF PROCEDURE: 2024    INTERPRETATION:  Patient is a 54-year-old male who underwent spirometry for coronary artery disease.      The patient's spirometry shows FVC to be 27%, FEV1 to be 24%, FEV1 to FVC ratio was 89%, FEF25% to 75% was 19%.  On the basis of this PFT, patient has very severe obstructive airway disease.  Also, there is an indication that the patient may have restrictive lung disease because of decreased FVC and if clinically warranted, please correlate with a complete PFT.          JT ESQUIVEL MD      D:  2024 14:55:08     T:  2024 01:19:03     SK/CHELSEA  Job #:  [id number]     Doc#:  [id number]

## 2024-07-18 NOTE — CONSULTS
Palliative Care Initial Note  Palliative Care Admit date:  7/18/24    ACP/palcare referral noted.  Pt well know to palcare team from prior admissions.  Made aware in d/w CM that pt likely to be transferred to another facility for a second opinion.  Will defer involvement for now unless transfer is aborted.     We have a HCPOA in this EMR wherein pt designated his step-dtr, Joya Land as his healthcare proxy in Jan of this year.  Pt designated Sridevi Salmeron and Brianna Reyes as his 1st and 2nd alternates, respectively.   Pt also completed a Living Will.

## 2024-07-18 NOTE — PROGRESS NOTES
[facility] Progress Note           Subjective/   54 y.o. year old male who we are seeing in consultation for ESKD on HD TTS.     HPI:  Last HD on 7/16 with 3 liters removed, post-weight of 97.2 kg.  Seen on dialysis. Orders confirmed.  Pre-weight at HD today 100.8 kg.  Denies any shortness of breath, on 4 L NC which he uses at home.    ROS:  No chest pain, +weak.    Objective/   GEN:  Chronically ill, BP (!) 173/83   Pulse 83   Temp 98.9 °F (37.2 °C) (Axillary)   Resp 16   Ht 1.753 m (5' 9\")   Wt 100.8 kg (222 lb 3.6 oz)   SpO2 99%   BMI 32.82 kg/m²   HEENT: non-icteric, no JVD  CV: S1, S2 without m/r/g; no LE edema  RESP: CTA B without w/r/r; breathing wnl  ABD: +bs, soft, nt, no hsm  SKIN: warm, no rashes  ACCESS: Left IJ TDC    Data/  Recent Labs     07/16/24  0522 07/17/24  1020 07/18/24  0514   WBC 7.0 6.2 8.0   HGB 8.5* 8.3* 8.7*   HCT 26.6* 26.7* 27.4*   MCV 85.3 85.0 84.7    260 283       Recent Labs     07/16/24  0522 07/17/24  1020 07/18/24  0514   * 132* 133*   K 6.1* 5.6* 6.3*   CL 99 96* 98*   CO2 24 28 24   GLUCOSE 142* 128* 85   PHOS 2.7  --  3.5   MG  --  2.00  --    BUN 82* 65* 79*   CREATININE 8.1* 6.1* 7.3*   LABGLOM 7* 10* 8*         Assessment/     - ESKD on HD Tuesday Thursday Saturday at [facility]    -Hyperkalemia    -Anemia    -CHF with reduced EF with bilateral pleural effusions   Unsuccessful thoracentesis on 7/15    -CAD/Chest pain                Angiogram with significant CAD, plans per Cardiology/CTS      Plan/     - HD per TTS schedule, 3-4 L UF as tolerated, EDW 93 kg  - Retacrit 10K qHD  - Trend labs, bp's, standing weights    ____________________________________  Ricky Carlos MD  [facility]  [email or web address]  Office: [phone]

## 2024-07-19 VITALS
BODY MASS INDEX: 32.91 KG/M2 | HEART RATE: 67 BPM | TEMPERATURE: 97.4 F | WEIGHT: 222.22 LBS | SYSTOLIC BLOOD PRESSURE: 123 MMHG | OXYGEN SATURATION: 99 % | HEIGHT: 69 IN | DIASTOLIC BLOOD PRESSURE: 73 MMHG | RESPIRATION RATE: 19 BRPM

## 2024-07-19 LAB
ALBUMIN SERPL-MCNC: 3.4 G/DL (ref 3.4–5)
ANION GAP SERPL CALCULATED.3IONS-SCNC: 11 MMOL/L (ref 3–16)
BUN SERPL-MCNC: 50 MG/DL (ref 7–20)
CALCIUM SERPL-MCNC: 9.8 MG/DL (ref 8.3–10.6)
CHLORIDE SERPL-SCNC: 97 MMOL/L (ref 99–110)
CO2 SERPL-SCNC: 26 MMOL/L (ref 21–32)
CREAT SERPL-MCNC: 5.5 MG/DL (ref 0.9–1.3)
DEPRECATED RDW RBC AUTO: 18.3 % (ref 12.4–15.4)
GFR SERPLBLD CREATININE-BSD FMLA CKD-EPI: 11 ML/MIN/{1.73_M2}
GLUCOSE BLD-MCNC: 103 MG/DL (ref 70–99)
GLUCOSE BLD-MCNC: 113 MG/DL (ref 70–99)
GLUCOSE BLD-MCNC: 148 MG/DL (ref 70–99)
GLUCOSE SERPL-MCNC: 174 MG/DL (ref 70–99)
HCT VFR BLD AUTO: 28.6 % (ref 40.5–52.5)
HGB BLD-MCNC: 8.9 G/DL (ref 13.5–17.5)
MCH RBC QN AUTO: 26.5 PG (ref 26–34)
MCHC RBC AUTO-ENTMCNC: 31.2 G/DL (ref 31–36)
MCV RBC AUTO: 84.8 FL (ref 80–100)
PERFORMED ON: ABNORMAL
PHOSPHATE SERPL-MCNC: 2.6 MG/DL (ref 2.5–4.9)
PLATELET # BLD AUTO: 245 K/UL (ref 135–450)
PMV BLD AUTO: 7.5 FL (ref 5–10.5)
POTASSIUM SERPL-SCNC: 4.9 MMOL/L (ref 3.5–5.1)
RBC # BLD AUTO: 3.38 M/UL (ref 4.2–5.9)
SODIUM SERPL-SCNC: 134 MMOL/L (ref 136–145)
WBC # BLD AUTO: 6.9 K/UL (ref 4–11)

## 2024-07-19 PROCEDURE — 6370000000 HC RX 637 (ALT 250 FOR IP): Performed by: INTERNAL MEDICINE

## 2024-07-19 PROCEDURE — 2700000000 HC OXYGEN THERAPY PER DAY

## 2024-07-19 PROCEDURE — 6370000000 HC RX 637 (ALT 250 FOR IP)

## 2024-07-19 PROCEDURE — 6370000000 HC RX 637 (ALT 250 FOR IP): Performed by: NURSE PRACTITIONER

## 2024-07-19 PROCEDURE — 94761 N-INVAS EAR/PLS OXIMETRY MLT: CPT

## 2024-07-19 PROCEDURE — 85027 COMPLETE CBC AUTOMATED: CPT

## 2024-07-19 PROCEDURE — 6360000002 HC RX W HCPCS: Performed by: STUDENT IN AN ORGANIZED HEALTH CARE EDUCATION/TRAINING PROGRAM

## 2024-07-19 PROCEDURE — 80069 RENAL FUNCTION PANEL: CPT

## 2024-07-19 PROCEDURE — 94660 CPAP INITIATION&MGMT: CPT

## 2024-07-19 PROCEDURE — 2500000003 HC RX 250 WO HCPCS: Performed by: NURSE PRACTITIONER

## 2024-07-19 RX ORDER — PANTOPRAZOLE SODIUM 40 MG/1
40 TABLET, DELAYED RELEASE ORAL
Qty: 30 TABLET | Refills: 3 | Status: SHIPPED | OUTPATIENT
Start: 2024-07-20

## 2024-07-19 RX ORDER — CALCIUM ACETATE 667 MG/1
2 CAPSULE ORAL 3 TIMES DAILY
Qty: 180 CAPSULE | Refills: 0 | Status: SHIPPED | OUTPATIENT
Start: 2024-07-19

## 2024-07-19 RX ORDER — ISOSORBIDE MONONITRATE 30 MG/1
30 TABLET, EXTENDED RELEASE ORAL DAILY
Qty: 30 TABLET | Refills: 0 | Status: SHIPPED | OUTPATIENT
Start: 2024-07-20

## 2024-07-19 RX ADMIN — CALCIUM ACETATE 1334 MG: 667 CAPSULE ORAL at 14:27

## 2024-07-19 RX ADMIN — METOPROLOL SUCCINATE 25 MG: 25 TABLET, EXTENDED RELEASE ORAL at 08:15

## 2024-07-19 RX ADMIN — PRAVASTATIN SODIUM 40 MG: 40 TABLET ORAL at 08:15

## 2024-07-19 RX ADMIN — ISOSORBIDE MONONITRATE 30 MG: 30 TABLET, EXTENDED RELEASE ORAL at 08:14

## 2024-07-19 RX ADMIN — ASPIRIN 81 MG 81 MG: 81 TABLET ORAL at 08:15

## 2024-07-19 RX ADMIN — HEPARIN SODIUM 5000 UNITS: 5000 INJECTION INTRAVENOUS; SUBCUTANEOUS at 14:26

## 2024-07-19 RX ADMIN — VENLAFAXINE HYDROCHLORIDE 75 MG: 37.5 CAPSULE, EXTENDED RELEASE ORAL at 08:15

## 2024-07-19 RX ADMIN — BUSPIRONE HYDROCHLORIDE 10 MG: 5 TABLET ORAL at 08:16

## 2024-07-19 RX ADMIN — BUSPIRONE HYDROCHLORIDE 10 MG: 5 TABLET ORAL at 14:27

## 2024-07-19 RX ADMIN — PANTOPRAZOLE SODIUM 40 MG: 40 TABLET, DELAYED RELEASE ORAL at 14:27

## 2024-07-19 RX ADMIN — PANTOPRAZOLE SODIUM 40 MG: 40 TABLET, DELAYED RELEASE ORAL at 08:16

## 2024-07-19 RX ADMIN — OXYCODONE 10 MG: 5 TABLET ORAL at 08:16

## 2024-07-19 RX ADMIN — CALCIUM ACETATE 1334 MG: 667 CAPSULE ORAL at 08:15

## 2024-07-19 RX ADMIN — PREGABALIN 25 MG: 25 CAPSULE ORAL at 08:15

## 2024-07-19 RX ADMIN — DULOXETINE HYDROCHLORIDE 60 MG: 60 CAPSULE, DELAYED RELEASE ORAL at 08:15

## 2024-07-19 RX ADMIN — HEPARIN SODIUM 5000 UNITS: 5000 INJECTION INTRAVENOUS; SUBCUTANEOUS at 08:16

## 2024-07-19 RX ADMIN — OXYCODONE 10 MG: 5 TABLET ORAL at 14:27

## 2024-07-19 ASSESSMENT — PAIN DESCRIPTION - ORIENTATION: ORIENTATION: MID

## 2024-07-19 ASSESSMENT — PAIN SCALES - GENERAL
PAINLEVEL_OUTOF10: 9
PAINLEVEL_OUTOF10: 6
PAINLEVEL_OUTOF10: 5
PAINLEVEL_OUTOF10: 8

## 2024-07-19 ASSESSMENT — PAIN DESCRIPTION - LOCATION
LOCATION: BACK
LOCATION: BACK

## 2024-07-19 ASSESSMENT — PAIN DESCRIPTION - DESCRIPTORS: DESCRIPTORS: ACHING

## 2024-07-19 NOTE — CARE COORDINATION
Per Cardiology and IM notes yesterday the pt to be transferred to [facility] for second opinion. However, no transfer has been initiated thus far. Spoke with Resident Physician who states that he will initiate the transfer today through the transfer center ( [id number]). RN CM will sign off. Please consult if needs arise.

## 2024-07-19 NOTE — PROGRESS NOTES
[facility] Progress Note           Subjective/   54 y.o. year old male who we are seeing in consultation for ESKD on HD TTS.     HPI:  Last HD on 7/18 with 4 liters removed, post-weight of 106.8 kg, suspect bed weight inaccuracy.  Denies any shortness of breath, on 4 L NC which he uses at home.    ROS:  No chest pain, +weak.    Objective/   GEN:  Chronically ill, /73   Pulse 80   Temp 97.8 °F (36.6 °C) (Oral)   Resp 17   Ht 1.753 m (5' 9\")   Wt 100.8 kg (222 lb 3.6 oz)   SpO2 97%   BMI 32.82 kg/m²   HEENT: non-icteric, no JVD  CV: S1, S2 without m/r/g; no LE edema  RESP: CTA B without w/r/r; breathing wnl  ABD: +bs, soft, nt, no hsm  SKIN: warm, no rashes  ACCESS: Left IJ TDC    Data/  Recent Labs     07/17/24  1020 07/18/24  0514 07/19/24  0559   WBC 6.2 8.0 6.9   HGB 8.3* 8.7* 8.9*   HCT 26.7* 27.4* 28.6*   MCV 85.0 84.7 84.8    283 245       Recent Labs     07/17/24  1020 07/18/24  0514 07/19/24  0559   * 133* 134*   K 5.6* 6.3* 4.9   CL 96* 98* 97*   CO2 28 24 26   GLUCOSE 128* 85 174*   PHOS  --  3.5 2.6   MG 2.00  --   --    BUN 65* 79* 50*   CREATININE 6.1* 7.3* 5.5*   LABGLOM 10* 8* 11*         Assessment/     - ESKD on HD Tuesday Thursday Saturday at [facility]    -Hyperkalemia    -Anemia    -CHF with reduced EF with bilateral pleural effusions   Unsuccessful thoracentesis on 7/15    -CAD/Chest pain                Angiogram with significant CAD, plans per Cardiology/CTS      Plan/     - HD per TTS schedule, 3-4 L UF as tolerated, EDW 93 kg  - Retacrit 10K qHD  - Trend labs, bp's, standing weights    ____________________________________  Ricky Carlos MD  [facility]  [email or web address]  Office: [phone]

## 2024-07-19 NOTE — PLAN OF CARE
Pt discharged and transferred to [facility], room 4122. Report called to the pt's nurse, NAVJOT Soliz.   Pt transported via ambulatory services with personal belongings. Report given to transport team.

## 2024-07-19 NOTE — DISCHARGE SUMMARY
Hospital Medicine Discharge Summary    Patient: Igor Ann   : 1970     Admit Date: 2024   Discharge Date:   24  Disposition:  [x]Transfer to    []C  []SNF  []ECF  []Acute Rehab  []LTAC  []Hospice  Code status:  [x]Full  []DNR/CCA  []Limited (DNR/CCA with Do Not Intubate)  []DNRCC  Condition at Discharge: Stable  Primary Care Provider: Vonnie Cleveland APRN - NP    Admitting Provider: Naveed Munson MD  Discharge Provider: Ho Neff DO     Discharge Diagnoses:      Active Hospital Problems    Diagnosis     Coronary artery disease, unspecified vessel or lesion type, unspecified whether angina present, unspecified whether native or transplanted heart [I25.10]     Abnormal stress test [R94.39]        Presenting Admission History:      Igor Ann is a/an 54 y.o. male with a significant past medical history of CAD, COPD, ESRD on dialysis, GERD, ICM with HFmrEF, and ZAINAB who presents to [facility] as a direct admit from [facility] inpatient for further cardiac evaluation. He had initially presented to [facility] ED with complaint of chest pain on 7/3. The pain was left-sided, radiated into left arm, lasted 10 minutes, and relieved by NTG given by EMS en route. Troponin was elevated at 113, but had a flat trajectory of 112->111->115. He was evaluate by cardiology service, had abnormal NM SPECT with possible ischemic changes in the LAD and RCA territories. He was sent here for interventional cardiology services. He currently denies any ongoing chest pain or symptoms on arrival here.      Assessment/Plan:      Angina  -Etiology unclear at this time, likely cardiac in nature   -Trop elevated   -EKG without JOSIE  -Cardiology consulted   -CT surgery consulted  -CABG cancelled  -TRANSFER TO [place] FOR SECOND OPINION     ESRD  -Etiology unclear at this time; cannot rule out [id number] renal failure   -HD //  -Nephrology consulted      CHF - chronic systolic failure w/ reduced EF 40-50% by Echo  dated 7/5/24.  Likely due to hypertensive heart disease.  Patient is euvolemic w/ no evidence of acute decompensation.  Continue current medical mgt.   -ECHO resulted & reviewed     DM2 - normally controlled on home antiGlycemics - Oral/Insulin - held/continued.  Follow FSBS/SSI low regimen and monitored closely for hypoglycemic ADR w/ POCT. Last HbA1c 6.0% dated 7/4/24. Resume home regimen at discharge.   -Lantus 15 U     GERD - w/out active signs/sxs of dysphagia/odynophagia. No evidence of active PUD or hx of GI bleed. Controlled on home PPI - continue.     Chronic Normocytic Anemia - etiology clinically unable to determine, w/out evidence of active bleeding/hemolysis. Asymptomatic w/out indication for transfusion. Follow serial labs - documented and reviewed.      Depression / Anxiety -- continue buspar / effexor / cymbalta / seroquel     Physical Exam Performed:      BP (!) 147/80   Pulse 63   Temp 97.4 °F (36.3 °C) (Axillary)   Resp 18   Ht 1.753 m (5' 9\")   Wt 100.8 kg (222 lb 3.6 oz)   SpO2 99%   BMI 32.82 kg/m²     General appearance:  No apparent distress, appears stated age and cooperative.  Respiratory:  Normal respiratory effort.   Cardiovascular:  Regular rate and rhythm.  Abdomen:  Soft, non-tender, non-distended.  Musculoskeletal:  No edema  Neurologic:  Non-focal  Psychiatric:  Alert and oriented    Patient Discharge Instructions:      Main Problem:   There are 2 main types of angina:  ?Stable angina - This is when the chest pain or discomfort follows a pattern. For example, you might notice it when you exercise, are stressed, or eat a big meal. These are all times when your heart needs more blood.  ?Unstable angina - This is when chest pain or discomfort does not follow a pattern. It can happen at any time. It is often more severe and lasts longer than stable angina, even when you rest. Unstable angina is an emergency because it can be a sign of a heart attack.  What symptoms should I look  BONES/SOFT TISSUE: The visualized osseous structures are unchanged.     1. Bilateral central opacities and right lower lobe opacities reflecting pulmonary edema versus pneumonia. 2. Trace left pleural effusion. Probable trace right pleural effusion.     IR GUIDED THORACENTESIS PLEURAL    Result Date: 7/15/2024  PROCEDURE: ULTRASOUNDGUIDED LEFT THORACENTESIS 7/15/2024 HISTORY: ORDERING SYSTEM PROVIDED HISTORY: L pleural effusion, pre-op CABG TECHNOLOGIST PROVIDED HISTORY: Reason for exam:->L pleural effusion, pre-op CABG Which side should the procedure be performed?->Left TECHNIQUE: Informed consent was obtained after a detailed explanation of the procedure including risks, benefits, and alternatives.  Universal protocol was performed. The left chest was prepped and draped in sterile fashion and local anesthesia was achieved with lidocaine.  A 5 Hebrew needle sheath was advanced under ultrasound guidance into pleural effusion and thoracentesis was attempted to be performed.  However due to small volume of fluid and patient discomfort, procedure was aborted. FINDINGS: Trace left pleural effusion.  Primarily atelectatic/non-aerated lung in the left lung base.     Trace left pleural effusion.  Unable to perform thoracentesis due to patient discomfort and only trace fluid. The density seen in the left lung base is primarily atelectatic/non-aerated lung.     XR CHEST PORTABLE    Result Date: 7/12/2024  EXAMINATION: ONE XRAY VIEW OF THE CHEST 7/12/2024 12:24 pm COMPARISON: 07/03/2024 and prior HISTORY: ORDERING SYSTEM PROVIDED HISTORY: Thoracentesis TECHNOLOGIST PROVIDED HISTORY: Reason for exam:->Thoracentesis Reason for Exam: post thora FINDINGS: Unchanged moderate left and small right pleural effusions with adjacent opacities.  Unchanged mild cardiomegaly and interstitial edema.  Status post TAVR.  Unchanged left central line. Linearity overlying the right lung apex. No acute osseous abnormality.     Unchanged moderate

## 2024-07-19 NOTE — PROGRESS NOTES
07/18/24 9943   NIV Type   NIV Started/Stopped On   Equipment Type v60   Mode Bilevel   Mask Type Full face mask   Mask Size Medium   Assessment   Pulse 81   Respirations 18   SpO2 99 %   Settings/Measurements   PIP Observed 13 cm H20   IPAP 12 cmH20   CPAP/EPAP 6 cmH2O   Vt (Measured) 585 mL   Rate Ordered 12   FiO2  30 %   I Time/ I Time % 0.9 s   Minute Volume (L/min) 10.3 Liters   Mask Leak (lpm) 0 lpm   Patient's Home Machine No   Alarm Settings   Alarms On Y   Low Pressure (cmH2O) 6 cmH2O   High Pressure (cmH2O) 30 cmH2O   Delay Alarm 20 sec(s)   Apnea (secs) 20 secs   RR Low (bpm) 6   RR High (bpm) 40 br/min   Oxygen Therapy/Pulse Ox   O2 Device (S)  PAP (positive airway pressure)

## 2024-07-19 NOTE — DISCHARGE SUMMARY
V2.0  Discharge Summary    Name:  Igor Ann /Age/Sex: 1970 (54 y.o. male)   Admit Date: 2024  Discharge Date: 24    MRN & CSN:  [id number] & [id number] Encounter Date and Time 24 4:53 PM EDT    Attending:  Ho Neff DO Discharging Provider: Lelo Trinh DO       Hospital Course:     Brief HPI: Igor Ann is a 54 y.o. male who presented with chest pain    Brief Problem Based Course:   Chest pain/CAD  initially presented to 2024 [place] ED with chest pain does radiate rating to the left side and arm for 10 minutes. Patient's pain was relieved with nitroglycerin. Patient troponin was elevated but had asked flat trajectory. Cardiology showed abnormal NM SPECT test with possible ischemic changes of LAD and RCA territories. Patient was sent to [place] for further cardiac workup. In the ED at [facility] [place], EKG showed T wave inversions in 2 3 aVF with no ST elevations or depressions which is new compared to May 24, 2024. Troponin 112 flat trajectory to 115.  Cardiology was consulted and performed cardiac cath which showed restenosis of the ramus, left circumflex, right coronary arteries.  Patient received 3 additional stents to the arteries listed above.  Cardiology consulted CT surgery in anticipation for CABG.  CT surgery at [place] not advising for CABG at this time.  Patient would like to have a second opinion at [facility].  Aspirin and Plavix has been held due to potential CABG.  Patient may resume if not receiving CABG.  ESRD on dialysis  Hemodialysis  and .  We consulted nephrology to manage his dialysis.  Heart failure and reduced EF  Not in acute exacerbation.  New echo showed 35 to 40% EF.  Continue home medicines during his hospital stay of Entresto and metoprolol.  Fluid management per dialysis.  Type 2 diabetes  Continue 15 units nightly of insulin  Chronic myelitis status post I&D and MT bone resection  Patient finished antibiotics  AM    PHUR 8.0 07/17/2024 12:55 AM    PHUR 8.0 05/01/2023 11:28 AM    LABCAST 3-5 Hyaline 03/10/2020 01:58 PM    WBCUA 10-20 07/17/2024 12:55 AM    RBCUA 5-10 07/17/2024 12:55 AM    MUCUS 1+ 05/23/2024 08:00 PM    BACTERIA Rare 07/17/2024 12:55 AM    CLARITYU Clear 07/17/2024 12:55 AM    SPECGRAV 1.015 06/14/2012 11:33 AM    LEUKOCYTESUR SMALL 07/17/2024 12:55 AM    UROBILINOGEN 0.2 07/17/2024 12:55 AM    BILIRUBINUR Negative 07/17/2024 12:55 AM    BLOODU Negative 07/17/2024 12:55 AM    GLUCOSEU 100 07/17/2024 12:55 AM    KETUA Negative 07/17/2024 12:55 AM    AMORPHOUS 3+ 03/10/2020 01:58 PM     Urine Cultures:   Lab Results   Component Value Date/Time    LABURIN  05/23/2024 08:00 PM     <50,000 CFU/ml mixed skin/urogenital mauri. No further workup     Blood Cultures:   Lab Results   Component Value Date/Time    BC No Growth after 4 days of incubation. 05/02/2024 04:55 PM     Lab Results   Component Value Date/Time    BLOODCULT2 No Growth after 4 days of incubation. 05/02/2024 05:16 PM     Organism:   Lab Results   Component Value Date/Time    ORG Staphylococcus epidermidis 05/02/2024 01:23 PM       Time Spent Discharging patient 45 minutes    Electronically signed by Lelo Trinh DO on 7/19/2024 at 4:53 PM

## 2024-07-19 NOTE — DISCHARGE INSTR - COC
Continuity of Care Form    Patient Name: Igor Ann   :  1970  MRN:  [id number]    Admit date:  2024  Discharge date:  24    Code Status Order: Full Code   Advance Directives:     Admitting Physician:  Naveed Munson MD  PCP: Vonnie Cleveland APRN - NP    Discharging Nurse: Latonya Barney RN  Discharging Hospital Unit/Room#: 0440/0440-01  Discharging Unit Phone Number: [phone]    Emergency Contact:   Extended Emergency Contact Information  Primary Emergency Contact: Crystal Ann  Address: [address]                      [place] [address] [facility]  Home Phone: [phone]  Mobile Phone: [phone]  Relation: Spouse  Secondary Emergency Contact: Sridevi Salmeron           [place], [place]  Home Phone: [phone]  Mobile Phone: [phone]  Relation: Brother/Sister  Preferred language: English   needed? No    Past Surgical History:  Past Surgical History:   Procedure Laterality Date    AORTIC VALVE REPLACEMENT N/A 10/15/2019    TRANSCATHETER AORTIC VALVE REPLACEMENT FEMORAL APPROACH performed by Dion Holland MD at [facility] CVOR    CARDIAC PROCEDURE N/A 7/10/2024    Left and right heart cath / coronary angiography performed by Lamberto Pate MD at [facility] CARDIAC CATH LAB    CATHETER REMOVAL Right 2019    PERITONEAL DIALYSIS CATHETER REMOVAL performed by Remi Kincaid MD at [facility] OR    COLONOSCOPY      COLONOSCOPY      WNL    CORONARY ANGIOPLASTY WITH STENT PLACEMENT  2015    CYST REMOVAL  2013    EXCISION CYSTS, NECK X2 AND ABDOMINAL benign    DIAGNOSTIC CARDIAC CATH LAB PROCEDURE      DIALYSIS FISTULA CREATION Left 10/30/2017    LEFT BRACHIAL CEPHALIC FISTULA    DIALYSIS FISTULA CREATION Left 2019    LIGATION  AV FISTULA performed by Brenden Rosario MD at [facility] OR    ENDOSCOPY, COLON, DIAGNOSTIC      ESOPHAGOGASTRODUODENOSCOPY  2024    ESOPHAGOGASTRODUODENOSCOPY BIOPSY    FOOT DEBRIDEMENT Right  05/26/2023    INCISION AND DEBRIDEMENT RIGHT FOOT WITH performed by Cely Rodriguez DPM at [facility] OR    FOOT DEBRIDEMENT Right 05/02/2024    RIGHT FOOT DEBRIDEMENT,  INCISION AND DRAINAGE,  BONE RESECTION,  GRAFT APPLICATION performed by Corrie Berg MD at [facility] OR    FOOT SURGERY Right 05/26/2023    RIGHT FIFTH RAY AMPUTATION performed by Cely Rodriguez DPM at [facility] OR    FOOT SURGERY Right 04/04/2024    RIGHT TRANSMETATARSAL AMPUTATION performed by Corrie Berg MD at [facility] OR    IR TUNNELED CATHETER PLACEMENT GREATER THAN 5 YEARS  03/21/2022    IR TUNNELED CATHETER PLACEMENT GREATER THAN 5 YEARS 3/21/2022 [facility] SPECIAL PROCEDURES    IR TUNNELED CATHETER PLACEMENT GREATER THAN 5 YEARS  04/21/2022    IR TUNNELED CATHETER PLACEMENT GREATER THAN 5 YEARS 4/21/2022 [facility] SPECIAL PROCEDURES    IR TUNNELED CATHETER PLACEMENT GREATER THAN 5 YEARS  04/26/2022    IR TUNNELED CATHETER PLACEMENT GREATER THAN 5 YEARS 4/26/2022 [facility] SPECIAL PROCEDURES    IR TUNNELED CATHETER PLACEMENT GREATER THAN 5 YEARS  06/23/2022    IR TUNNELED CATHETER PLACEMENT GREATER THAN 5 YEARS 6/23/2022 [facility] SPECIAL PROCEDURES    OTHER SURGICAL HISTORY  02/01/2017    laparoscopic cholecystectomy with intraoperative cholangiogram    OTHER SURGICAL HISTORY  2018    PORT PLACEMENT  - vas cath    OTHER SURGICAL HISTORY Bilateral 06/26/2018    laprascopic peritoneal dialysis catheter placement    OTHER SURGICAL HISTORY Right 09/2018    peritoneal dialysis port placed on right side of abdomen    OTHER SURGICAL HISTORY  05/28/2019    PTA/Stenting R External Iliac artery    ME LAPS INSERTION TUNNELED INTRAPERITONEAL CATHETER N/A 09/21/2018    LAPAROSCOPIC PERITONEAL DIALYSIS CATHETER REPLACEMENT performed by Remi Kincaid MD at [facility] OR    RECTAL SURGERY N/A 02/24/2022    PERINEAL ABCESS DRAINAGE performed by Remi Kincaid MD at [facility] OR    THORACENTESIS N/A 10/02/2022    THORACENTESIS ULTRASOUND performed by Charlie Esquivel MD at

## 2024-07-19 NOTE — PLAN OF CARE
Pt refuses standing weight at this time. He states his legs \"are asleep\" and to come back later and he'll try.  Pt educated about the bed weight inaccuracies and that a standing weight will be beneficial in his care and transfer to [facility].

## 2024-07-19 NOTE — PROGRESS NOTES
V2.0    [facility] Progress Note      Name:  Igor Ann /Age/Sex: 1970  (54 y.o. male)   MRN & CSN:  [id number] & [id number] Encounter Date/Time: 2024 9:14 AM EDT   Location:  [facility] PCP: Vonnie Cleveland APRN - NP     Attending:Ho Neff DO       Hospital Day: 10    Assessment and Recommendations   Igor Ann is a 54 y.o. male with pmh of COPD, CAD status post 3 stents, ESRD on dialysis, GERD, heart failure, type 2 diabetes, and ZAINAB who presents with Coronary artery disease, unspecified vessel or lesion type, unspecified whether angina present, unspecified whether native or transplanted heart        Plan:   Chest pain/CAD status post 3 stents   -Ramus, left circumflex, right coronary artery stent placed in   -Troponins flat trajectory, EKG with T wave inversions new compared to previous  -Cardiology consulted  -Cath cath showed restenosis of the ramus, left circumflex, right coronary artery  -Patient received 3 additional stents  in the subsequent arteries listed above  -Continue aspirin 81  -Continue pravastatin 40 mg  -Holding Plavix by CT surgery anticipation for CABG  -Eliquis currently on hold  -new echo showed EF 35-40%  -CT surgery consulted and prepping for CABG   -CABG canceled   -Second opinion for CABG  -plan for transfer  -medically unchanged    ESRD on dialysis  -Hemodialysis on ,  -Nephrology has been consulted    Heart failure reduced EF  -Previous echo showed 45 to 50%  --new echo showed EF 35-40%  -Continue Entresto  -Continue metoprolol  -CHF order set in place    Type 2 diabetes  Continue 15 units nightly  Low-dose sliding scale    GERD  -EGD negative  -Will continue pantoprazole 40 mg twice daily    Chronic normocytic anemia  Continue monitor CBC  No signs of bleeding, holding Eliquis  Secondary to CKD  Patient gets Retacrit    Chronic osteomyelitis  Status post foot I&D with MT bone resection  Patient finished antibiotics and  this time.    Review of Systems:      Pertinent positives and negatives discussed in HPI      Objective:     Intake/Output Summary (Last 24 hours) at 7/18/2024 2009  Last data filed at 7/18/2024 1413  Gross per 24 hour   Intake 1109 ml   Output 100 ml   Net 1009 ml        Vitals:   Vitals:    07/18/24 0931 07/18/24 1018 07/18/24 1400 07/18/24 1504   BP: (!) 173/83   (!) 158/108   Pulse: 83   77   Resp:  18 16 16   Temp: 98.9 °F (37.2 °C)   98.9 °F (37.2 °C)   TempSrc: Axillary      SpO2: 99%      Weight:    100.8 kg (222 lb 3.6 oz)   Height:             Physical Exam:      General appearance: No apparent distress, appears stated age and cooperative.  HEENT: Pupils equal, round, and reactive to light. Conjunctivae/corneas clear.  Neck: Supple, with full range of motion. No jugular venous distention. Trachea midline.  Respiratory:  Normal respiratory effort. Clear to auscultation, bilaterally without Rales/Wheezes/Rhonchi.  Cardiovascular: Regular rate and rhythm without murmurs, rubs or gallops.  Abdomen: Soft, non-tender, non-distended with normal bowel sounds.  Musculoskeletal: No clubbing, cyanosis or edema bilaterally.  Full range of motion without deformity.  Skin: Skin color, texture, turgor normal.  No rashes or lesions.  Neurologic:  Neurovascularly intact without any focal sensory/motor deficits.  Psychiatric: Alert and oriented, thought content appropriate, normal insight  Capillary Refill: Brisk, 2 seconds, normal       Medications:   Medications:    epoetin siddharth-epbx        heparin (porcine)        metoprolol succinate  25 mg Oral Daily    aspirin  81 mg Oral Daily    isosorbide mononitrate  30 mg Oral Daily    sodium chloride flush  5-40 mL IntraVENous 2 times per day    docusate sodium  100 mg Oral Daily    epoetin siddharth-epbx  10,000 Units IntraVENous Once per day on Tue Thu Sat    heparin (porcine)  5,000 Units SubCUTAneous 3 times per day    busPIRone  10 mg Oral TID    calcium acetate  2 capsule Oral

## 2024-07-19 NOTE — PROGRESS NOTES
07/19/24 0354   NIV Type   $NIV $Daily Charge   NIV Started/Stopped On   Equipment Type v60   Mode Bilevel   Mask Type Full face mask   Mask Size Medium   Assessment   SpO2 98 %   Settings/Measurements   PIP Observed 13 cm H20   IPAP 12 cmH20   CPAP/EPAP 6 cmH2O   Vt (Measured) 542 mL   Rate Ordered 12   FiO2  30 %   I Time/ I Time % 0.9 s   Minute Volume (L/min) 9.8 Liters   Mask Leak (lpm) 0 lpm   Patient's Home Machine No   Alarm Settings   Alarms On Y   Low Pressure (cmH2O) 6 cmH2O   High Pressure (cmH2O) 30 cmH2O   Delay Alarm 20 sec(s)   Apnea (secs) 20 secs   RR Low (bpm) 6   RR High (bpm) 40 br/min   Oxygen Therapy/Pulse Ox   O2 Device PAP (positive airway pressure)

## 2024-07-20 LAB
BLOOD BANK DISPENSE STATUS: NORMAL
BLOOD BANK DISPENSE STATUS: NORMAL
BLOOD BANK PRODUCT CODE: NORMAL
BLOOD BANK PRODUCT CODE: NORMAL
BPU ID: NORMAL
BPU ID: NORMAL
DESCRIPTION BLOOD BANK: NORMAL
DESCRIPTION BLOOD BANK: NORMAL

## 2024-07-24 ENCOUNTER — TELEPHONE (OUTPATIENT)
Dept: CARDIOLOGY CLINIC | Age: 54
End: 2024-07-24

## 2024-07-24 NOTE — TELEPHONE ENCOUNTER
LAMBERTO reached out today to schedule NPLR follow up for patient. Patient d/c from [facility] today and needs follow up in 2-4 weeks. I attempted to call patient to schedule; LMOM with call back number. Can you assist in scheduling appointment? Thank you .

## 2024-07-25 NOTE — TELEPHONE ENCOUNTER
Spoke with [facility] with [facility] appt scheduled with gutierrez.    Date/time/location given to RN she will inform pt.

## 2024-08-06 LAB
BASOPHILS ABSOLUTE: 0.1 /ΜL
BASOPHILS RELATIVE PERCENT: 1 %
EOSINOPHILS ABSOLUTE: 0.3 /ΜL
EOSINOPHILS RELATIVE PERCENT: 5.2 %
HCT VFR BLD CALC: 31.9 % (ref 41–53)
HEMOGLOBIN: 9.5 G/DL (ref 13.5–17.5)
LYMPHOCYTES ABSOLUTE: 0.8 /ΜL
LYMPHOCYTES RELATIVE PERCENT: 13.3 %
MCH RBC QN AUTO: 26.1 PG
MCHC RBC AUTO-ENTMCNC: 29.8 G/DL
MCV RBC AUTO: 87.6 FL
MONOCYTES ABSOLUTE: 0.5 /ΜL
MONOCYTES RELATIVE PERCENT: 7.6 %
NEUTROPHILS ABSOLUTE: 4.6 /ΜL
NEUTROPHILS RELATIVE PERCENT: 72.9 %
PLATELET # BLD: 252 K/ΜL
PMV BLD AUTO: 8 FL
RBC # BLD: 3.64 10^6/ΜL
WBC # BLD: 6.4 10^3/ML

## 2024-08-08 LAB
A/G RATIO: 1.1
ALBUMIN: 3.4 G/DL
ALP BLD-CCNC: 134 U/L
ALT SERPL-CCNC: 10 U/L
AST SERPL-CCNC: 9 U/L
BILIRUB SERPL-MCNC: 0.3 MG/DL (ref 0.1–1.4)
BILIRUBIN DIRECT: 0.1 MG/DL
BILIRUBIN, INDIRECT: NORMAL
CHOLESTEROL, TOTAL: 108 MG/DL
CHOLESTEROL/HDL RATIO: NORMAL
GLOBULIN: NORMAL
HDLC SERPL-MCNC: 46 MG/DL (ref 35–70)
LDL CHOLESTEROL: 47
NONHDLC SERPL-MCNC: NORMAL MG/DL
TOTAL PROTEIN: 6.5 G/DL (ref 6.4–8.2)
TRIGL SERPL-MCNC: 74 MG/DL
VLDLC SERPL CALC-MCNC: 15 MG/DL

## 2024-08-13 ENCOUNTER — OFFICE VISIT (OUTPATIENT)
Dept: CARDIOLOGY CLINIC | Age: 54
End: 2024-08-13
Payer: MEDICARE

## 2024-08-13 VITALS
HEART RATE: 78 BPM | OXYGEN SATURATION: 95 % | SYSTOLIC BLOOD PRESSURE: 138 MMHG | BODY MASS INDEX: 31.49 KG/M2 | DIASTOLIC BLOOD PRESSURE: 80 MMHG | HEIGHT: 69 IN | WEIGHT: 212.6 LBS

## 2024-08-13 DIAGNOSIS — I50.20 HFREF (HEART FAILURE WITH REDUCED EJECTION FRACTION) (HCC): Primary | ICD-10-CM

## 2024-08-13 DIAGNOSIS — I25.5 ISCHEMIC CARDIOMYOPATHY: ICD-10-CM

## 2024-08-13 PROCEDURE — G8427 DOCREV CUR MEDS BY ELIG CLIN: HCPCS | Performed by: NURSE PRACTITIONER

## 2024-08-13 PROCEDURE — 1111F DSCHRG MED/CURRENT MED MERGE: CPT | Performed by: NURSE PRACTITIONER

## 2024-08-13 PROCEDURE — 3079F DIAST BP 80-89 MM HG: CPT | Performed by: NURSE PRACTITIONER

## 2024-08-13 PROCEDURE — 1036F TOBACCO NON-USER: CPT | Performed by: NURSE PRACTITIONER

## 2024-08-13 PROCEDURE — G8417 CALC BMI ABV UP PARAM F/U: HCPCS | Performed by: NURSE PRACTITIONER

## 2024-08-13 PROCEDURE — 99215 OFFICE O/P EST HI 40 MIN: CPT | Performed by: NURSE PRACTITIONER

## 2024-08-13 PROCEDURE — 3017F COLORECTAL CA SCREEN DOC REV: CPT | Performed by: NURSE PRACTITIONER

## 2024-08-13 PROCEDURE — 3075F SYST BP GE 130 - 139MM HG: CPT | Performed by: NURSE PRACTITIONER

## 2024-08-13 RX ORDER — ASPIRIN 81 MG/1
81 TABLET, CHEWABLE ORAL DAILY
COMMUNITY

## 2024-08-13 RX ORDER — ISOSORBIDE MONONITRATE 30 MG/1
30 TABLET, EXTENDED RELEASE ORAL DAILY
Qty: 30 TABLET | Refills: 6
Start: 2024-08-13

## 2024-08-13 RX ORDER — ATORVASTATIN CALCIUM 20 MG/1
40 TABLET, FILM COATED ORAL DAILY
COMMUNITY

## 2024-08-13 ASSESSMENT — ENCOUNTER SYMPTOMS: SHORTNESS OF BREATH: 1

## 2024-08-13 NOTE — PROGRESS NOTES
30%.  Left ventricularpressure was measured at 24.  Echo pressure 115/57 there is no gradient close pullback.  CONCLUSIONS:  1.  Successful IVUS guided drug-eluting stent insertion to the ramus intermediate, native circumflex, and native right coronary artery  ASSESSMENT/RECOMMENDATIONS:  1.  Continue with antiplatelet therapy utilizing Plavix 75 mg and subsequently anticoagulation with Eliquis.  2.  Guideline directed medical therapy    Stress test 5/2023:   Abnormal high risk myocardial perfusion study.   There is reduced activity within the alex-septal, infero-apical, and   posterior-lateral segments consistent with mainly prior infarction.   There is mild darrick-infarct ischemia near the mid-inferior segment.   The left ventricular size is severely dilated.   The estimated left ventricular function is 34 %.   Recommendation   The findings may suggest attenuation artifact, but the possibility of   underlying ischemic heart disease cannot be excluded.   There is extra-cardiac activity within the GI tract that reduces   sensitivity.   Clinical correlation is recommended.    Echo 3/2023:   The left ventricular systolic function is severely reduced with an ejection   fraction of 25-30 %.   There is hypokinesis of the inferior, basal inferior, apex, apical anterior   and anterolateral walls.   Left ventricular cavity size is mildly dilated with normal left ventricular   wall thickness.   Grade I diastolic dysfunction with normal filling pressure.   No changes noted from previous echo on 12-9-2022 in left ventricular   function.   Mild mitral and tricuspid regurgitation.   Right ventricular systolic function is mildly reduced .   Systolic pulmonic artery pressure (SPAP) is estimated at 40 mmHg consistent   with mild pulmonary hypertension (Right atrial pressure of 3 mmHg).    Cardiology Labs Reviewed:   CBC: No results for input(s): \"WBC\", \"HGB\", \"HCT\", \"PLT\" in the last 72 hours.  BMP:No results for input(s): \"NA\",

## 2024-08-13 NOTE — PATIENT INSTRUCTIONS
Restart imdur 30 mg daily  Increase atorvastatin to 40 mg daily  Will discuss with Dr. Pate next steps and call you

## 2024-08-22 ENCOUNTER — TELEPHONE (OUTPATIENT)
Dept: CARDIOLOGY | Age: 54
End: 2024-08-22

## 2024-08-22 DIAGNOSIS — R93.1 ABNORMAL FINDINGS ON CARDIAC CATHETERIZATION: Primary | ICD-10-CM

## 2024-08-22 NOTE — TELEPHONE ENCOUNTER
Discussed with Dr. Pate. Will arrange staged high risk PCI RCA and possibly LAD. Called patient's mobile number to discuss in detail (had been discussed in office as well), no answer, left voicemail. Called and spoke with staff at Health system. They will help with scheduling procedure. Please let me know if patient returns call and has questions, happy to talk with him.     Adali- please scheduled staged PCI RCA/possibly LAD with Dr. Pate. He would like anesthesia and [employer] Rot[employer] and shockwave reps as well as [employer] reps for laser atherectomy and temporary pacer. Best contact number is [phone] and ask for [place]. Thanks!

## 2024-08-27 NOTE — TELEPHONE ENCOUNTER
Cardiac Cath orders:staged PCI RCA/possibly LAD with Dr. Pate  Ordering Provider:  Performing Provider: LAMBERTO  Length of time for procedure:  Anesthesia:Y  Reps needed:anesthesia and [employer] Rot[employer]ator and shockwave reps as well as [employer] reps for laser atherectomy and temporary pacer  Specific equip needed: (or NA)  Medications to hold: see below   Pt aware of meds to hold?:NO   Urgent? (Need time frame):  (All echos should be completed prior to the scheduled procedure date, preferably [facility] or other outreach)       Please inform patient to be NPO after midnight (8 hours). Patient is to take 1/2 dose of PM insulin the night before procedure.No insulin the morning of. Hold eliquis 48 hours prior to cath/pci. It is ok to take aspirin.     Take all other medications including HTN meds and aspirin.     Do no take over the counter medications morning of.

## 2024-08-30 NOTE — PROGRESS NOTES
12/10/22 2147   NIV Type   NIV Started/Stopped On   Equipment Type v60   Mode Bilevel   Mask Type Full face mask   Mask Size Medium   Settings/Measurements   PIP Observed 17 cm H20   IPAP 16 cmH20   CPAP/EPAP 8 cmH2O   Vt (Measured) 654 mL   Rate Ordered 16   Resp 18   FiO2  30 %   I Time/ I Time % 1.05 s   Minute Volume (L/min) 11 Liters   Mask Leak (lpm) 31 lpm   Comfort Level Good   Using Accessory Muscles No   SpO2 100   Alarm Settings   Alarms On Y   Low Pressure (cmH2O) 6 cmH2O   High Pressure (cmH2O) 30 cmH2O   Apnea (secs) 20 secs   RR Low (bpm) 6   RR High (bpm) 40 br/min   Oxygen Therapy/Pulse Ox   O2 Device PAP (positive airway pressure)   SpO2 100 % RX ED Progress Note: Vancomycin Consult Acceptance      Indication for therapy: Bacterial meningitis    ALLERGIES:   Allergen Reactions    Amoxicillin     Penicillins Other (See Comments)     Unknown- reaction as a child       Most recent height and weight information:  Weight: 70.4 kg (08/29/24 1723)  Height: 5' 10\" (177.8 cm) (08/29/24 1723)    The Following are the calculated  Current Weights for Kleber De La Cruz       Adjusted Ideal    70.4 kg 73 kg               Labs:  Serum Creatinine and Creatinine Clearance:  Serum creatinine: 0.72 mg/dL 08/29/24 1806  Estimated creatinine clearance: 120 mL/min    Maximum Temperature (last 24 hours)       Value Max    Temp 98.9 °F (37.2 °C)          WBC (K/mcL)   Date/Time Value   08/29/2024 1806 19.9 (H)     Microbiology Results       None              Assessment/Plan:  Briefly, this is a 21 year old male started on vancomycin for Bacterial meningitis, indicated for one time vancomycin dose in the Emergency department. Initial dose will be Vancomycin 1500 mg once.    If vancomycin therapy is appropriate to be resumed, the inpatient provider will need to order a Pharmacy to dose and monitor vancomycin therapy consult if pharmacist monitoring of pertinent labs and drug levels is desired.    Thank you,    Estefany Taylor RPH  8/29/2024 8:44 PM

## 2024-09-19 PROBLEM — R93.1 ABNORMAL FINDINGS ON CARDIAC CATHETERIZATION: Status: ACTIVE | Noted: 2024-08-22

## 2024-09-19 NOTE — TELEPHONE ENCOUNTER
Spoke with emelyn MORFNI at [facility]. Informed of instructions and I faxed over as well.

## 2024-09-19 NOTE — TELEPHONE ENCOUNTER
Sounds good, lets also let cath lab know will need b[place] sci agent drug coated balloons for this case. Lets make sure we have them for the case. Thank you.

## 2024-09-19 NOTE — TELEPHONE ENCOUNTER
Procedure:  High Risk PCI  Doctor:  Dr. Pate  Date:  10/21/24  Time:  8am  Arrival:  6:30am  Reps:  [place]/Shockwave  Anesthesia:  Yes      Spoke with Monica/ANABELL. Please have patient arrive to the main entrance of [facility] ([address] [place] [phone]) and check in with the registration desk.  They will be directed to the Cath Lab.  Remind patient to be NPO after midnight (8 hours prior). Do not apply lotions/creams on skin the day of procedure.    Faxed instructions to : [phone].    Pt has dialysis Tue/Thurs.

## 2024-09-19 NOTE — TELEPHONE ENCOUNTER
Appears we will need to fax these instructions to [facility]     Please inform patient to be NPO after midnight (8 hours). Patient is to take 1/2 dose of PM insulin the night before procedure.No insulin the morning of. Hold eliquis 48 hours prior to cath/pci. It is ok to take aspirin.      Take all other medications including HTN meds and aspirin.      Do no take over the counter medications morning of.

## 2024-10-05 LAB
A/G RATIO: 1.6 RATIO (ref 0.8–2.6)
ALBUMIN: 3.9 G/DL (ref 3.5–5.2)
ALP BLD-CCNC: 193 U/L (ref 23–144)
ALT SERPL-CCNC: 17 U/L (ref 0–60)
AST SERPL-CCNC: 14 U/L (ref 0–55)
BASOPHILS ABSOLUTE: 0.1 K/UL (ref 0–0.3)
BASOPHILS RELATIVE PERCENT: 0.5 % (ref 0–2)
BILIRUB SERPL-MCNC: 0.3 MG/DL (ref 0–1.2)
BUN / CREAT RATIO: 10 (ref 7–25)
BUN BLDV-MCNC: 81 MG/DL (ref 3–29)
CALCIUM SERPL-MCNC: 10 MG/DL (ref 8.5–10.5)
CHLORIDE BLD-SCNC: 87 MEQ/L (ref 96–110)
CO2: 25 MEQ/L (ref 19–32)
COMMENT: NORMAL
CREAT SERPL-MCNC: 7.9 MG/DL (ref 0.5–1.2)
DIFFERENTIAL COUNT: ABNORMAL
EOSINOPHILS ABSOLUTE: 0.3 K/UL (ref 0–0.5)
EOSINOPHILS RELATIVE PERCENT: 2.3 % (ref 0–5)
ESTIMATED GLOMERULAR FILTRATION RATE CREATININE EQUATION: 7 MLS/MIN/1.73M2
FASTING STATUS: ABNORMAL
GLOBULIN: 2.5 G/DL (ref 1.9–3.6)
GLUCOSE BLD-MCNC: 95 MG/DL (ref 70–99)
HCT VFR BLD CALC: 36.5 % (ref 37.5–51)
HEMOGLOBIN: 11.2 G/DL (ref 13–17.7)
IMMATURE GRANS (ABS): 0.1 K/UL (ref 0–0.1)
IMMATURE GRANULOCYTES %: 0.5 %
LYMPHOCYTES ABSOLUTE: 0.9 K/UL (ref 0.9–4.1)
LYMPHOCYTES RELATIVE PERCENT: 6.5 % (ref 14–51)
MAGNESIUM: 2.1 MG/DL (ref 1.4–2.5)
MCH RBC QN AUTO: 26.1 PG (ref 26–34)
MCHC RBC AUTO-ENTMCNC: 30.7 G/DL (ref 30.7–35.5)
MCV RBC AUTO: 85.1 FL (ref 80–100)
MONOCYTES ABSOLUTE: 0.9 K/UL (ref 0.2–1)
MONOCYTES RELATIVE PERCENT: 7.2 % (ref 4–12)
NEUTROPHILS ABSOLUTE: 10.8 K/UL (ref 1.8–7.5)
NEUTROPHILS RELATIVE PERCENT: 83 % (ref 42–80)
PDW BLD-RTO: 16.4 %
PLATELET # BLD: 255 K/UL (ref 140–400)
PMV BLD AUTO: 10.6 FL (ref 7.2–11.7)
POTASSIUM SERPL-SCNC: 4.4 MEQ/L (ref 3.4–5.3)
RBC # BLD: 4.29 M/UL (ref 4.14–5.8)
RETICULOCYTE ABSOLUTE COUNT: 0 /100 WBC
SODIUM BLD-SCNC: 134 MEQ/L (ref 135–148)
TOTAL PROTEIN: 6.4 G/DL (ref 6–8.3)
WBC # BLD: 13 K/UL (ref 3.5–10.9)

## 2024-10-07 LAB
C DIFFICILE TOXIN, EIA: NEGATIVE
Lab: POSITIVE

## 2024-10-20 ENCOUNTER — ANESTHESIA EVENT (OUTPATIENT)
Dept: CARDIAC CATH/INVASIVE PROCEDURES | Age: 54
DRG: 323 | End: 2024-10-20
Payer: MEDICARE

## 2024-10-21 ENCOUNTER — ANESTHESIA (OUTPATIENT)
Dept: CARDIAC CATH/INVASIVE PROCEDURES | Age: 54
DRG: 323 | End: 2024-10-21
Payer: MEDICARE

## 2024-10-21 ENCOUNTER — APPOINTMENT (OUTPATIENT)
Dept: GENERAL RADIOLOGY | Age: 54
DRG: 323 | End: 2024-10-21
Attending: INTERNAL MEDICINE
Payer: MEDICARE

## 2024-10-21 ENCOUNTER — HOSPITAL ENCOUNTER (INPATIENT)
Age: 54
LOS: 2 days | Discharge: NURSING, REHAB OR LONG-TERM CARE | DRG: 323 | End: 2024-10-23
Attending: INTERNAL MEDICINE | Admitting: INTERNAL MEDICINE
Payer: MEDICARE

## 2024-10-21 DIAGNOSIS — R93.1 ABNORMAL FINDINGS ON CARDIAC CATHETERIZATION: ICD-10-CM

## 2024-10-21 DIAGNOSIS — I25.10 CAD (CORONARY ARTERY DISEASE): ICD-10-CM

## 2024-10-21 DIAGNOSIS — L08.9 FOOT INFECTION: Primary | ICD-10-CM

## 2024-10-21 PROBLEM — Z98.890 STATUS POST CARDIAC CATHETERIZATION: Status: ACTIVE | Noted: 2024-10-21

## 2024-10-21 LAB
ANION GAP SERPL CALCULATED.3IONS-SCNC: 17 MMOL/L (ref 3–16)
BUN SERPL-MCNC: 85 MG/DL (ref 7–20)
CALCIUM SERPL-MCNC: 9.3 MG/DL (ref 8.3–10.6)
CHLORIDE SERPL-SCNC: 90 MMOL/L (ref 99–110)
CHOLEST SERPL-MCNC: 118 MG/DL (ref 0–199)
CO2 SERPL-SCNC: 26 MMOL/L (ref 21–32)
CREAT SERPL-MCNC: 10.2 MG/DL (ref 0.9–1.3)
DEPRECATED RDW RBC AUTO: 18 % (ref 12.4–15.4)
ECHO BSA: 2.16 M2
EKG ATRIAL RATE: 81 BPM
EKG DIAGNOSIS: NORMAL
EKG P AXIS: 57 DEGREES
EKG P-R INTERVAL: 188 MS
EKG Q-T INTERVAL: 388 MS
EKG QRS DURATION: 94 MS
EKG QTC CALCULATION (BAZETT): 450 MS
EKG R AXIS: 50 DEGREES
EKG T AXIS: -38 DEGREES
EKG VENTRICULAR RATE: 81 BPM
GFR SERPLBLD CREATININE-BSD FMLA CKD-EPI: 5 ML/MIN/{1.73_M2}
GLUCOSE BLD-MCNC: 149 MG/DL (ref 70–99)
GLUCOSE BLD-MCNC: 217 MG/DL (ref 70–99)
GLUCOSE SERPL-MCNC: 168 MG/DL (ref 70–99)
HCT VFR BLD AUTO: 36.1 % (ref 40.5–52.5)
HDLC SERPL-MCNC: 46 MG/DL (ref 40–60)
HGB BLD-MCNC: 11.1 G/DL (ref 13.5–17.5)
LDLC SERPL CALC-MCNC: 59 MG/DL
MCH RBC QN AUTO: 26.1 PG (ref 26–34)
MCHC RBC AUTO-ENTMCNC: 30.7 G/DL (ref 31–36)
MCV RBC AUTO: 84.9 FL (ref 80–100)
PERFORMED ON: ABNORMAL
PERFORMED ON: ABNORMAL
PLATELET # BLD AUTO: 242 K/UL (ref 135–450)
PMV BLD AUTO: 8.2 FL (ref 5–10.5)
POC ACT LR: 232 SEC
POC ACT LR: 242 SEC
POC ACT LR: 258 SEC
POC ACT LR: 259 SEC
POC ACT LR: 320 SEC
POC ACT LR: 373 SEC
POC ACT LR: >400 SEC
POTASSIUM SERPL-SCNC: 5 MMOL/L (ref 3.5–5.1)
RBC # BLD AUTO: 4.26 M/UL (ref 4.2–5.9)
REASON FOR REJECTION: NORMAL
REJECTED TEST: NORMAL
SODIUM SERPL-SCNC: 133 MMOL/L (ref 136–145)
TRIGL SERPL-MCNC: 67 MG/DL (ref 0–150)
VLDLC SERPL CALC-MCNC: 13 MG/DL
WBC # BLD AUTO: 9.4 K/UL (ref 4–11)

## 2024-10-21 PROCEDURE — B241ZZ3 ULTRASONOGRAPHY OF MULTIPLE CORONARY ARTERIES, INTRAVASCULAR: ICD-10-PCS | Performed by: INTERNAL MEDICINE

## 2024-10-21 PROCEDURE — 3700000000 HC ANESTHESIA ATTENDED CARE: Performed by: INTERNAL MEDICINE

## 2024-10-21 PROCEDURE — 80061 LIPID PANEL: CPT

## 2024-10-21 PROCEDURE — 7100000011 HC PHASE II RECOVERY - ADDTL 15 MIN: Performed by: INTERNAL MEDICINE

## 2024-10-21 PROCEDURE — 93454 CORONARY ARTERY ANGIO S&I: CPT | Performed by: INTERNAL MEDICINE

## 2024-10-21 PROCEDURE — 71045 X-RAY EXAM CHEST 1 VIEW: CPT

## 2024-10-21 PROCEDURE — 6360000002 HC RX W HCPCS

## 2024-10-21 PROCEDURE — C1885 CATH, TRANSLUMIN ANGIO LASER: HCPCS | Performed by: INTERNAL MEDICINE

## 2024-10-21 PROCEDURE — C1769 GUIDE WIRE: HCPCS | Performed by: INTERNAL MEDICINE

## 2024-10-21 PROCEDURE — 92924 PRQ TRLUML C ATHRC 1 ART&/BR: CPT | Performed by: INTERNAL MEDICINE

## 2024-10-21 PROCEDURE — 85347 COAGULATION TIME ACTIVATED: CPT

## 2024-10-21 PROCEDURE — C1725 CATH, TRANSLUMIN NON-LASER: HCPCS | Performed by: INTERNAL MEDICINE

## 2024-10-21 PROCEDURE — 4A023N7 MEASUREMENT OF CARDIAC SAMPLING AND PRESSURE, LEFT HEART, PERCUTANEOUS APPROACH: ICD-10-PCS | Performed by: INTERNAL MEDICINE

## 2024-10-21 PROCEDURE — 6360000004 HC RX CONTRAST MEDICATION: Performed by: INTERNAL MEDICINE

## 2024-10-21 PROCEDURE — 2580000003 HC RX 258: Performed by: INTERNAL MEDICINE

## 2024-10-21 PROCEDURE — 93005 ELECTROCARDIOGRAM TRACING: CPT | Performed by: INTERNAL MEDICINE

## 2024-10-21 PROCEDURE — 7100000001 HC PACU RECOVERY - ADDTL 15 MIN: Performed by: INTERNAL MEDICINE

## 2024-10-21 PROCEDURE — 80048 BASIC METABOLIC PNL TOTAL CA: CPT

## 2024-10-21 PROCEDURE — C2623 CATH, TRANSLUMIN, DRUG-COAT: HCPCS | Performed by: INTERNAL MEDICINE

## 2024-10-21 PROCEDURE — C1874 STENT, COATED/COV W/DEL SYS: HCPCS | Performed by: INTERNAL MEDICINE

## 2024-10-21 PROCEDURE — 6360000002 HC RX W HCPCS: Performed by: INTERNAL MEDICINE

## 2024-10-21 PROCEDURE — 02C03ZZ EXTIRPATION OF MATTER FROM CORONARY ARTERY, ONE ARTERY, PERCUTANEOUS APPROACH: ICD-10-PCS | Performed by: INTERNAL MEDICINE

## 2024-10-21 PROCEDURE — 92979 ENDOLUMINL IVUS OCT C EA: CPT | Performed by: INTERNAL MEDICINE

## 2024-10-21 PROCEDURE — 6370000000 HC RX 637 (ALT 250 FOR IP): Performed by: INTERNAL MEDICINE

## 2024-10-21 PROCEDURE — 2709999900 HC NON-CHARGEABLE SUPPLY: Performed by: INTERNAL MEDICINE

## 2024-10-21 PROCEDURE — 99152 MOD SED SAME PHYS/QHP 5/>YRS: CPT | Performed by: INTERNAL MEDICINE

## 2024-10-21 PROCEDURE — 36415 COLL VENOUS BLD VENIPUNCTURE: CPT

## 2024-10-21 PROCEDURE — 92978 ENDOLUMINL IVUS OCT C 1ST: CPT | Performed by: INTERNAL MEDICINE

## 2024-10-21 PROCEDURE — 7100000010 HC PHASE II RECOVERY - FIRST 15 MIN: Performed by: INTERNAL MEDICINE

## 2024-10-21 PROCEDURE — 2580000003 HC RX 258

## 2024-10-21 PROCEDURE — 92972 PERQ TRLUML CORONRY LITHOTRP: CPT | Performed by: INTERNAL MEDICINE

## 2024-10-21 PROCEDURE — 93010 ELECTROCARDIOGRAM REPORT: CPT | Performed by: INTERNAL MEDICINE

## 2024-10-21 PROCEDURE — 027035Z DILATION OF CORONARY ARTERY, ONE ARTERY WITH TWO DRUG-ELUTING INTRALUMINAL DEVICES, PERCUTANEOUS APPROACH: ICD-10-PCS | Performed by: INTERNAL MEDICINE

## 2024-10-21 PROCEDURE — B2111ZZ FLUOROSCOPY OF MULTIPLE CORONARY ARTERIES USING LOW OSMOLAR CONTRAST: ICD-10-PCS | Performed by: INTERNAL MEDICINE

## 2024-10-21 PROCEDURE — 2500000003 HC RX 250 WO HCPCS

## 2024-10-21 PROCEDURE — 2060000000 HC ICU INTERMEDIATE R&B

## 2024-10-21 PROCEDURE — 99153 MOD SED SAME PHYS/QHP EA: CPT | Performed by: INTERNAL MEDICINE

## 2024-10-21 PROCEDURE — C1894 INTRO/SHEATH, NON-LASER: HCPCS | Performed by: INTERNAL MEDICINE

## 2024-10-21 PROCEDURE — C1887 CATHETER, GUIDING: HCPCS | Performed by: INTERNAL MEDICINE

## 2024-10-21 PROCEDURE — 92933 PRQ TRLML C ATHRC ST ANGIOP1: CPT | Performed by: INTERNAL MEDICINE

## 2024-10-21 PROCEDURE — C1761 HC CATH TRANSLUM INTRAVASCULAR LITHOTRIPSY CORONARY: HCPCS | Performed by: INTERNAL MEDICINE

## 2024-10-21 PROCEDURE — 33210 INSERT ELECTRD/PM CATH SNGL: CPT | Performed by: INTERNAL MEDICINE

## 2024-10-21 PROCEDURE — XW0J3HA INTRODUCTION OF PACLITAXEL-COATED BALLOON TECHNOLOGY, ONE BALLOON INTO CORONARY ARTERY, ONE ARTERY, PERCUTANEOUS APPROACH, NEW TECHNOLOGY GROUP 10: ICD-10-PCS | Performed by: INTERNAL MEDICINE

## 2024-10-21 PROCEDURE — C1753 CATH, INTRAVAS ULTRASOUND: HCPCS | Performed by: INTERNAL MEDICINE

## 2024-10-21 PROCEDURE — 2700000000 HC OXYGEN THERAPY PER DAY

## 2024-10-21 PROCEDURE — 7100000000 HC PACU RECOVERY - FIRST 15 MIN: Performed by: INTERNAL MEDICINE

## 2024-10-21 PROCEDURE — 94761 N-INVAS EAR/PLS OXIMETRY MLT: CPT

## 2024-10-21 PROCEDURE — 2500000003 HC RX 250 WO HCPCS: Performed by: INTERNAL MEDICINE

## 2024-10-21 PROCEDURE — 5A1D70Z PERFORMANCE OF URINARY FILTRATION, INTERMITTENT, LESS THAN 6 HOURS PER DAY: ICD-10-PCS | Performed by: INTERNAL MEDICINE

## 2024-10-21 PROCEDURE — 3700000001 HC ADD 15 MINUTES (ANESTHESIA): Performed by: INTERNAL MEDICINE

## 2024-10-21 PROCEDURE — 85027 COMPLETE CBC AUTOMATED: CPT

## 2024-10-21 PROCEDURE — 02F03ZZ FRAGMENTATION IN CORONARY ARTERY, ONE ARTERY, PERCUTANEOUS APPROACH: ICD-10-PCS | Performed by: INTERNAL MEDICINE

## 2024-10-21 PROCEDURE — 6370000000 HC RX 637 (ALT 250 FOR IP): Performed by: ANESTHESIOLOGY

## 2024-10-21 DEVICE — EVEROLIMUS-ELUTING PLATINUM CHROMIUM CORONARY STENT SYSTEM
Type: IMPLANTABLE DEVICE | Status: FUNCTIONAL
Brand: SYNERGY MEGATRON™

## 2024-10-21 RX ORDER — SODIUM CHLORIDE 9 MG/ML
INJECTION, SOLUTION INTRAVENOUS
Status: DISCONTINUED | OUTPATIENT
Start: 2024-10-21 | End: 2024-10-21 | Stop reason: SDUPTHER

## 2024-10-21 RX ORDER — ACETAMINOPHEN 325 MG/1
650 TABLET ORAL EVERY 6 HOURS PRN
Status: DISCONTINUED | OUTPATIENT
Start: 2024-10-21 | End: 2024-10-21 | Stop reason: SDUPTHER

## 2024-10-21 RX ORDER — ROCURONIUM BROMIDE 10 MG/ML
INJECTION, SOLUTION INTRAVENOUS
Status: DISCONTINUED | OUTPATIENT
Start: 2024-10-21 | End: 2024-10-21 | Stop reason: SDUPTHER

## 2024-10-21 RX ORDER — NITROGLYCERIN 20 MG/100ML
INJECTION INTRAVENOUS CONTINUOUS PRN
Status: COMPLETED | OUTPATIENT
Start: 2024-10-21 | End: 2024-10-21

## 2024-10-21 RX ORDER — SODIUM CHLORIDE 9 MG/ML
INJECTION, SOLUTION INTRAVENOUS PRN
Status: DISCONTINUED | OUTPATIENT
Start: 2024-10-21 | End: 2024-10-21 | Stop reason: HOSPADM

## 2024-10-21 RX ORDER — DEXAMETHASONE SODIUM PHOSPHATE 4 MG/ML
INJECTION, SOLUTION INTRA-ARTICULAR; INTRALESIONAL; INTRAMUSCULAR; INTRAVENOUS; SOFT TISSUE
Status: DISCONTINUED | OUTPATIENT
Start: 2024-10-21 | End: 2024-10-21 | Stop reason: SDUPTHER

## 2024-10-21 RX ORDER — DULOXETIN HYDROCHLORIDE 60 MG/1
60 CAPSULE, DELAYED RELEASE ORAL DAILY
Status: DISCONTINUED | OUTPATIENT
Start: 2024-10-21 | End: 2024-10-23 | Stop reason: HOSPADM

## 2024-10-21 RX ORDER — OXYCODONE HYDROCHLORIDE 5 MG/1
10 TABLET ORAL PRN
Status: DISCONTINUED | OUTPATIENT
Start: 2024-10-21 | End: 2024-10-21 | Stop reason: HOSPADM

## 2024-10-21 RX ORDER — ONDANSETRON 2 MG/ML
4 INJECTION INTRAMUSCULAR; INTRAVENOUS EVERY 6 HOURS PRN
Status: DISCONTINUED | OUTPATIENT
Start: 2024-10-21 | End: 2024-10-23 | Stop reason: HOSPADM

## 2024-10-21 RX ORDER — ASPIRIN 81 MG/1
81 TABLET, CHEWABLE ORAL DAILY
Status: DISCONTINUED | OUTPATIENT
Start: 2024-10-22 | End: 2024-10-23 | Stop reason: HOSPADM

## 2024-10-21 RX ORDER — IOPAMIDOL 755 MG/ML
INJECTION, SOLUTION INTRAVASCULAR PRN
Status: DISCONTINUED | OUTPATIENT
Start: 2024-10-21 | End: 2024-10-21 | Stop reason: HOSPADM

## 2024-10-21 RX ORDER — ONDANSETRON 4 MG/1
4 TABLET, ORALLY DISINTEGRATING ORAL EVERY 8 HOURS PRN
Status: DISCONTINUED | OUTPATIENT
Start: 2024-10-21 | End: 2024-10-23 | Stop reason: HOSPADM

## 2024-10-21 RX ORDER — CLOPIDOGREL BISULFATE 75 MG/1
75 TABLET ORAL DAILY
Status: DISCONTINUED | OUTPATIENT
Start: 2024-10-22 | End: 2024-10-23 | Stop reason: HOSPADM

## 2024-10-21 RX ORDER — QUETIAPINE FUMARATE 25 MG/1
50 TABLET, FILM COATED ORAL NIGHTLY
Status: DISCONTINUED | OUTPATIENT
Start: 2024-10-21 | End: 2024-10-23 | Stop reason: HOSPADM

## 2024-10-21 RX ORDER — TRAZODONE HYDROCHLORIDE 50 MG/1
50 TABLET, FILM COATED ORAL DAILY
Status: DISCONTINUED | OUTPATIENT
Start: 2024-10-21 | End: 2024-10-23 | Stop reason: HOSPADM

## 2024-10-21 RX ORDER — PANTOPRAZOLE SODIUM 40 MG/1
40 TABLET, DELAYED RELEASE ORAL
Status: DISCONTINUED | OUTPATIENT
Start: 2024-10-21 | End: 2024-10-23 | Stop reason: HOSPADM

## 2024-10-21 RX ORDER — ACETAMINOPHEN 650 MG/1
650 SUPPOSITORY RECTAL EVERY 6 HOURS PRN
Status: DISCONTINUED | OUTPATIENT
Start: 2024-10-21 | End: 2024-10-21 | Stop reason: SDUPTHER

## 2024-10-21 RX ORDER — LIDOCAINE HYDROCHLORIDE 20 MG/ML
INJECTION, SOLUTION INFILTRATION; PERINEURAL
Status: DISCONTINUED | OUTPATIENT
Start: 2024-10-21 | End: 2024-10-21 | Stop reason: SDUPTHER

## 2024-10-21 RX ORDER — INSULIN GLARGINE 100 [IU]/ML
15 INJECTION, SOLUTION SUBCUTANEOUS NIGHTLY
Status: DISCONTINUED | OUTPATIENT
Start: 2024-10-21 | End: 2024-10-23 | Stop reason: HOSPADM

## 2024-10-21 RX ORDER — ONDANSETRON 2 MG/ML
4 INJECTION INTRAMUSCULAR; INTRAVENOUS EVERY 6 HOURS PRN
Status: DISCONTINUED | OUTPATIENT
Start: 2024-10-21 | End: 2024-10-21 | Stop reason: HOSPADM

## 2024-10-21 RX ORDER — OXYCODONE HYDROCHLORIDE 5 MG/1
5 TABLET ORAL PRN
Status: DISCONTINUED | OUTPATIENT
Start: 2024-10-21 | End: 2024-10-21 | Stop reason: HOSPADM

## 2024-10-21 RX ORDER — GLUCAGON 1 MG/ML
1 KIT INJECTION PRN
Status: DISCONTINUED | OUTPATIENT
Start: 2024-10-21 | End: 2024-10-23 | Stop reason: HOSPADM

## 2024-10-21 RX ORDER — INSULIN LISPRO 100 [IU]/ML
0-8 INJECTION, SOLUTION INTRAVENOUS; SUBCUTANEOUS
Status: DISCONTINUED | OUTPATIENT
Start: 2024-10-21 | End: 2024-10-23 | Stop reason: HOSPADM

## 2024-10-21 RX ORDER — SODIUM CHLORIDE 0.9 % (FLUSH) 0.9 %
5-40 SYRINGE (ML) INJECTION EVERY 12 HOURS SCHEDULED
Status: DISCONTINUED | OUTPATIENT
Start: 2024-10-21 | End: 2024-10-21 | Stop reason: HOSPADM

## 2024-10-21 RX ORDER — SODIUM CHLORIDE 0.9 % (FLUSH) 0.9 %
5-40 SYRINGE (ML) INJECTION PRN
Status: DISCONTINUED | OUTPATIENT
Start: 2024-10-21 | End: 2024-10-23 | Stop reason: HOSPADM

## 2024-10-21 RX ORDER — ASPIRIN 325 MG
325 TABLET ORAL ONCE
Status: COMPLETED | OUTPATIENT
Start: 2024-10-21 | End: 2024-10-21

## 2024-10-21 RX ORDER — IPRATROPIUM BROMIDE AND ALBUTEROL SULFATE 2.5; .5 MG/3ML; MG/3ML
1 SOLUTION RESPIRATORY (INHALATION) EVERY 6 HOURS PRN
Status: DISCONTINUED | OUTPATIENT
Start: 2024-10-21 | End: 2024-10-23 | Stop reason: HOSPADM

## 2024-10-21 RX ORDER — SODIUM CHLORIDE 0.9 % (FLUSH) 0.9 %
5-40 SYRINGE (ML) INJECTION EVERY 12 HOURS SCHEDULED
Status: DISCONTINUED | OUTPATIENT
Start: 2024-10-21 | End: 2024-10-23 | Stop reason: HOSPADM

## 2024-10-21 RX ORDER — SODIUM CHLORIDE 9 MG/ML
INJECTION, SOLUTION INTRAVENOUS PRN
Status: DISCONTINUED | OUTPATIENT
Start: 2024-10-21 | End: 2024-10-23 | Stop reason: HOSPADM

## 2024-10-21 RX ORDER — OXYCODONE HYDROCHLORIDE 5 MG/1
10 TABLET ORAL EVERY 6 HOURS PRN
Status: DISCONTINUED | OUTPATIENT
Start: 2024-10-21 | End: 2024-10-23 | Stop reason: HOSPADM

## 2024-10-21 RX ORDER — BUSPIRONE HYDROCHLORIDE 5 MG/1
10 TABLET ORAL 3 TIMES DAILY
Status: DISCONTINUED | OUTPATIENT
Start: 2024-10-21 | End: 2024-10-23 | Stop reason: HOSPADM

## 2024-10-21 RX ORDER — CALCIUM ACETATE 667 MG/1
2 CAPSULE ORAL 3 TIMES DAILY
Status: DISCONTINUED | OUTPATIENT
Start: 2024-10-21 | End: 2024-10-23 | Stop reason: HOSPADM

## 2024-10-21 RX ORDER — ONDANSETRON 2 MG/ML
INJECTION INTRAMUSCULAR; INTRAVENOUS
Status: DISCONTINUED | OUTPATIENT
Start: 2024-10-21 | End: 2024-10-21 | Stop reason: SDUPTHER

## 2024-10-21 RX ORDER — PROPOFOL 10 MG/ML
INJECTION, EMULSION INTRAVENOUS
Status: DISCONTINUED | OUTPATIENT
Start: 2024-10-21 | End: 2024-10-21 | Stop reason: SDUPTHER

## 2024-10-21 RX ORDER — ONDANSETRON 2 MG/ML
4 INJECTION INTRAMUSCULAR; INTRAVENOUS EVERY 30 MIN PRN
Status: DISCONTINUED | OUTPATIENT
Start: 2024-10-21 | End: 2024-10-21 | Stop reason: HOSPADM

## 2024-10-21 RX ORDER — IPRATROPIUM BROMIDE AND ALBUTEROL SULFATE 2.5; .5 MG/3ML; MG/3ML
1 SOLUTION RESPIRATORY (INHALATION) ONCE
Status: COMPLETED | OUTPATIENT
Start: 2024-10-21 | End: 2024-10-21

## 2024-10-21 RX ORDER — LORAZEPAM 0.5 MG/1
0.5 TABLET ORAL
Status: DISCONTINUED | OUTPATIENT
Start: 2024-10-21 | End: 2024-10-21 | Stop reason: HOSPADM

## 2024-10-21 RX ORDER — ACETAMINOPHEN 325 MG/1
650 TABLET ORAL EVERY 4 HOURS PRN
Status: DISCONTINUED | OUTPATIENT
Start: 2024-10-21 | End: 2024-10-23 | Stop reason: HOSPADM

## 2024-10-21 RX ORDER — NALOXONE HYDROCHLORIDE 0.4 MG/ML
INJECTION, SOLUTION INTRAMUSCULAR; INTRAVENOUS; SUBCUTANEOUS PRN
Status: DISCONTINUED | OUTPATIENT
Start: 2024-10-21 | End: 2024-10-21 | Stop reason: HOSPADM

## 2024-10-21 RX ORDER — DEXTROSE MONOHYDRATE 100 MG/ML
INJECTION, SOLUTION INTRAVENOUS CONTINUOUS PRN
Status: DISCONTINUED | OUTPATIENT
Start: 2024-10-21 | End: 2024-10-23 | Stop reason: HOSPADM

## 2024-10-21 RX ORDER — SODIUM CHLORIDE 0.9 % (FLUSH) 0.9 %
5-40 SYRINGE (ML) INJECTION PRN
Status: DISCONTINUED | OUTPATIENT
Start: 2024-10-21 | End: 2024-10-21 | Stop reason: HOSPADM

## 2024-10-21 RX ORDER — IPRATROPIUM BROMIDE AND ALBUTEROL SULFATE 2.5; .5 MG/3ML; MG/3ML
1 SOLUTION RESPIRATORY (INHALATION) ONCE
Status: DISCONTINUED | OUTPATIENT
Start: 2024-10-21 | End: 2024-10-21

## 2024-10-21 RX ORDER — HEPARIN SODIUM 1000 [USP'U]/ML
INJECTION, SOLUTION INTRAVENOUS; SUBCUTANEOUS PRN
Status: DISCONTINUED | OUTPATIENT
Start: 2024-10-21 | End: 2024-10-21 | Stop reason: HOSPADM

## 2024-10-21 RX ORDER — POLYETHYLENE GLYCOL 3350 17 G/17G
17 POWDER, FOR SOLUTION ORAL DAILY PRN
Status: DISCONTINUED | OUTPATIENT
Start: 2024-10-21 | End: 2024-10-23 | Stop reason: HOSPADM

## 2024-10-21 RX ORDER — PREGABALIN 25 MG/1
25 CAPSULE ORAL DAILY
Status: DISCONTINUED | OUTPATIENT
Start: 2024-10-21 | End: 2024-10-23 | Stop reason: HOSPADM

## 2024-10-21 RX ORDER — PHENYLEPHRINE HCL IN 0.9% NACL 1 MG/10 ML
SYRINGE (ML) INTRAVENOUS
Status: DISCONTINUED | OUTPATIENT
Start: 2024-10-21 | End: 2024-10-21 | Stop reason: SDUPTHER

## 2024-10-21 RX ORDER — CLOPIDOGREL 300 MG/1
600 TABLET, FILM COATED ORAL ONCE
Status: COMPLETED | OUTPATIENT
Start: 2024-10-21 | End: 2024-10-21

## 2024-10-21 RX ORDER — NICARDIPINE HYDROCHLORIDE 2.5 MG/ML
INJECTION INTRAVENOUS PRN
Status: DISCONTINUED | OUTPATIENT
Start: 2024-10-21 | End: 2024-10-21 | Stop reason: HOSPADM

## 2024-10-21 RX ORDER — ATORVASTATIN CALCIUM 40 MG/1
40 TABLET, FILM COATED ORAL NIGHTLY
Status: DISCONTINUED | OUTPATIENT
Start: 2024-10-21 | End: 2024-10-23 | Stop reason: HOSPADM

## 2024-10-21 RX ORDER — VENLAFAXINE HYDROCHLORIDE 37.5 MG/1
75 CAPSULE, EXTENDED RELEASE ORAL DAILY
Status: DISCONTINUED | OUTPATIENT
Start: 2024-10-21 | End: 2024-10-23 | Stop reason: HOSPADM

## 2024-10-21 RX ORDER — HEPARIN SODIUM 5000 [USP'U]/ML
5000 INJECTION, SOLUTION INTRAVENOUS; SUBCUTANEOUS 3 TIMES DAILY
Status: DISCONTINUED | OUTPATIENT
Start: 2024-10-21 | End: 2024-10-23 | Stop reason: HOSPADM

## 2024-10-21 RX ADMIN — Medication 100 MCG: at 11:38

## 2024-10-21 RX ADMIN — Medication 200 MCG: at 10:10

## 2024-10-21 RX ADMIN — Medication 200 MCG: at 10:58

## 2024-10-21 RX ADMIN — Medication 100 MCG: at 08:56

## 2024-10-21 RX ADMIN — ONDANSETRON 4 MG: 2 INJECTION INTRAMUSCULAR; INTRAVENOUS at 09:26

## 2024-10-21 RX ADMIN — HYDROMORPHONE HYDROCHLORIDE 0.25 MG: 1 INJECTION, SOLUTION INTRAMUSCULAR; INTRAVENOUS; SUBCUTANEOUS at 18:10

## 2024-10-21 RX ADMIN — PREGABALIN 25 MG: 25 CAPSULE ORAL at 17:11

## 2024-10-21 RX ADMIN — Medication 100 MCG: at 10:19

## 2024-10-21 RX ADMIN — ATORVASTATIN CALCIUM 40 MG: 40 TABLET, FILM COATED ORAL at 21:00

## 2024-10-21 RX ADMIN — CALCIUM ACETATE 1334 MG: 667 CAPSULE ORAL at 21:00

## 2024-10-21 RX ADMIN — VENLAFAXINE HYDROCHLORIDE 75 MG: 37.5 CAPSULE, EXTENDED RELEASE ORAL at 17:10

## 2024-10-21 RX ADMIN — TRAZODONE HYDROCHLORIDE 50 MG: 50 TABLET ORAL at 21:00

## 2024-10-21 RX ADMIN — Medication 10 ML: at 21:02

## 2024-10-21 RX ADMIN — ONDANSETRON 4 MG: 2 INJECTION INTRAMUSCULAR; INTRAVENOUS at 11:58

## 2024-10-21 RX ADMIN — ROCURONIUM BROMIDE 20 MG: 50 INJECTION, SOLUTION INTRAVENOUS at 09:59

## 2024-10-21 RX ADMIN — OXYCODONE 10 MG: 5 TABLET ORAL at 13:40

## 2024-10-21 RX ADMIN — Medication 200 MCG: at 10:26

## 2024-10-21 RX ADMIN — SODIUM CHLORIDE: 9 INJECTION, SOLUTION INTRAVENOUS at 09:10

## 2024-10-21 RX ADMIN — Medication 200 MCG: at 09:02

## 2024-10-21 RX ADMIN — SODIUM CHLORIDE: 9 INJECTION, SOLUTION INTRAVENOUS at 08:56

## 2024-10-21 RX ADMIN — PHENYLEPHRINE HYDROCHLORIDE 50 MCG/MIN: 10 INJECTION INTRAVENOUS at 09:02

## 2024-10-21 RX ADMIN — Medication 200 MCG: at 11:39

## 2024-10-21 RX ADMIN — INSULIN LISPRO 2 UNITS: 100 INJECTION, SOLUTION INTRAVENOUS; SUBCUTANEOUS at 17:16

## 2024-10-21 RX ADMIN — CALCIUM ACETATE 1334 MG: 667 CAPSULE ORAL at 17:10

## 2024-10-21 RX ADMIN — ROCURONIUM BROMIDE 20 MG: 50 INJECTION, SOLUTION INTRAVENOUS at 10:57

## 2024-10-21 RX ADMIN — Medication 200 MCG: at 11:27

## 2024-10-21 RX ADMIN — QUETIAPINE FUMARATE 50 MG: 25 TABLET ORAL at 21:00

## 2024-10-21 RX ADMIN — Medication 200 MCG: at 10:05

## 2024-10-21 RX ADMIN — IPRATROPIUM BROMIDE AND ALBUTEROL SULFATE 1 DOSE: 2.5; .5 SOLUTION RESPIRATORY (INHALATION) at 08:14

## 2024-10-21 RX ADMIN — DEXAMETHASONE SODIUM PHOSPHATE 4 MG: 4 INJECTION, SOLUTION INTRAMUSCULAR; INTRAVENOUS at 09:26

## 2024-10-21 RX ADMIN — OXYCODONE 10 MG: 5 TABLET ORAL at 21:00

## 2024-10-21 RX ADMIN — PANTOPRAZOLE SODIUM 40 MG: 40 TABLET, DELAYED RELEASE ORAL at 17:11

## 2024-10-21 RX ADMIN — BUSPIRONE HYDROCHLORIDE 10 MG: 5 TABLET ORAL at 20:59

## 2024-10-21 RX ADMIN — Medication 200 MCG: at 11:44

## 2024-10-21 RX ADMIN — ROCURONIUM BROMIDE 60 MG: 50 INJECTION, SOLUTION INTRAVENOUS at 08:57

## 2024-10-21 RX ADMIN — ASPIRIN 325 MG: 325 TABLET ORAL at 07:47

## 2024-10-21 RX ADMIN — Medication 100 MCG: at 10:23

## 2024-10-21 RX ADMIN — HEPARIN SODIUM 5000 UNITS: 5000 INJECTION INTRAVENOUS; SUBCUTANEOUS at 17:12

## 2024-10-21 RX ADMIN — PROPOFOL 180 MG: 10 INJECTION, EMULSION INTRAVENOUS at 08:56

## 2024-10-21 RX ADMIN — Medication 100 MCG: at 10:18

## 2024-10-21 RX ADMIN — DULOXETINE HYDROCHLORIDE 60 MG: 60 CAPSULE, DELAYED RELEASE ORAL at 17:11

## 2024-10-21 RX ADMIN — BUSPIRONE HYDROCHLORIDE 10 MG: 5 TABLET ORAL at 17:11

## 2024-10-21 RX ADMIN — INSULIN GLARGINE 15 UNITS: 100 INJECTION, SOLUTION SUBCUTANEOUS at 21:02

## 2024-10-21 RX ADMIN — LIDOCAINE HYDROCHLORIDE 80 MG: 20 INJECTION, SOLUTION INFILTRATION; PERINEURAL at 08:56

## 2024-10-21 RX ADMIN — CLOPIDOGREL BISULFATE 600 MG: 300 TABLET, FILM COATED ORAL at 08:38

## 2024-10-21 RX ADMIN — Medication 200 MCG: at 11:12

## 2024-10-21 RX ADMIN — HEPARIN SODIUM 5000 UNITS: 5000 INJECTION INTRAVENOUS; SUBCUTANEOUS at 21:01

## 2024-10-21 ASSESSMENT — PAIN SCALES - GENERAL
PAINLEVEL_OUTOF10: 6
PAINLEVEL_OUTOF10: 10
PAINLEVEL_OUTOF10: 9

## 2024-10-21 ASSESSMENT — PAIN DESCRIPTION - LOCATION
LOCATION: BACK

## 2024-10-21 ASSESSMENT — PAIN SCALES - WONG BAKER
WONGBAKER_NUMERICALRESPONSE: NO HURT
WONGBAKER_NUMERICALRESPONSE: NO HURT

## 2024-10-21 ASSESSMENT — PAIN DESCRIPTION - DESCRIPTORS: DESCRIPTORS: ACHING

## 2024-10-21 ASSESSMENT — PAIN DESCRIPTION - ORIENTATION: ORIENTATION: LOWER

## 2024-10-21 ASSESSMENT — PAIN - FUNCTIONAL ASSESSMENT: PAIN_FUNCTIONAL_ASSESSMENT: ACTIVITIES ARE NOT PREVENTED

## 2024-10-21 NOTE — PROGRESS NOTES
10/21/24 1524   RT Protocol   History Pulmonary Disease 2   Respiratory pattern 0   Breath sounds 2   Cough 0   Indications for Bronchodilator Therapy None   Bronchodilator Assessment Score 4

## 2024-10-21 NOTE — CONSULTS
Consult Placed     Who: Dr. Narayan, Nephrology   Date:  Time:     Electronically signed by Sailaja Florez on 10/21/2024 at 2:50 PM

## 2024-10-21 NOTE — ANESTHESIA PRE PROCEDURE
from Last 3 Encounters:   10/21/24 96.2 kg (212 lb)   08/13/24 96.4 kg (212 lb 9.6 oz)   07/09/24 96 kg (211 lb 10.3 oz)     Body mass index is 31.31 kg/m².    CBC:   Lab Results   Component Value Date/Time    WBC 9.4 10/21/2024 07:20 AM    RBC 4.26 10/21/2024 07:20 AM    HGB 11.1 10/21/2024 07:20 AM    HCT 36.1 10/21/2024 07:20 AM    MCV 84.9 10/21/2024 07:20 AM    RDW 18.0 10/21/2024 07:20 AM     10/21/2024 07:20 AM       CMP:   Lab Results   Component Value Date/Time     10/05/2024 04:58 PM    K 4.4 10/05/2024 04:58 PM    K 5.5 07/09/2024 04:35 AM    CL 87 10/05/2024 04:58 PM    CO2 25 10/05/2024 04:58 PM    BUN 81 10/05/2024 04:58 PM    CREATININE 7.9 10/05/2024 04:58 PM    GFRAA 10 10/05/2022 11:53 AM    GFRAA >60 05/17/2013 06:50 AM    AGRATIO 1.6 10/05/2024 04:58 PM    LABGLOM 11 07/19/2024 05:59 AM    LABGLOM 9 04/10/2024 09:25 AM    LABGLOM 12 01/25/2024 12:00 AM    GLUCOSE 95 10/05/2024 04:58 PM    CALCIUM 10.0 10/05/2024 04:58 PM    BILITOT 0.3 10/05/2024 04:58 PM    ALKPHOS 193 10/05/2024 04:58 PM    AST 14 10/05/2024 04:58 PM    ALT 17 10/05/2024 04:58 PM       POC Tests: No results for input(s): \"POCGLU\", \"POCNA\", \"POCK\", \"POCCL\", \"POCBUN\", \"POCHEMO\", \"POCHCT\" in the last 72 hours.    Coags:   Lab Results   Component Value Date/Time    PROTIME 14.2 07/17/2024 10:20 AM    INR 1.08 07/17/2024 10:20 AM    APTT 22.2 09/27/2023 12:14 AM       HCG (If Applicable): No results found for: \"PREGTESTUR\", \"PREGSERUM\", \"HCG\", \"HCGQUANT\"     ABGs:   Lab Results   Component Value Date/Time    PHART 7.308 11/25/2022 11:22 AM    PO2ART 25.6 11/25/2022 11:22 AM    YHF1RMH 55.0 11/25/2022 11:22 AM    RHN7NJJ 26.9 11/25/2022 11:22 AM    BEART 0.2 11/25/2022 11:22 AM    A6MDKLWU 35.4 11/25/2022 11:22 AM        Type & Screen (If Applicable):  Lab Results   Component Value Date    ABORH A POS 07/16/2024    LABANTI NEG 07/16/2024       Drug/Infectious Status (If Applicable):  No results found for: \"HIV\",

## 2024-10-21 NOTE — CONSULTS
packs/day: 0.00     Average packs/day: 0.5 packs/day for 33.0 years (16.5 ttl pk-yrs)     Types: Cigarettes     Start date: 1987     Quit date: 2020     Years since quittin.4    Smokeless tobacco: Never    Tobacco comments:     States quit 2021   Vaping Use    Vaping status: Never Used   Substance and Sexual Activity    Alcohol use: Not Currently     Alcohol/week: 0.0 standard drinks of alcohol     Comment: occ    Drug use: No    Sexual activity: Yes     Partners: Female     Comment:    Other Topics Concern    Not on file   Social History Narrative    Not on file     Social Determinants of Health     Financial Resource Strain: Not on file   Food Insecurity: Not At Risk (2024)    Received from [facility] and [employer] [facility]    Food Insecurities     Within the past 12 months, did you worry that your food would run out before you got money to buy more?: No     Within the past 12 months, did the food you bought just not last and you didn't have money to get more?: No   Transportation Needs: Not At Risk (2024)    Received from [facility] and [employer] [facility]    Transportation     Within the past 12 months, has a lack of transportation kept you from medical appointments or from doing things needed for daily living?: No   Physical Activity: Not on file   Stress: Not on file   Social Connections: Moderately Isolated (10/21/2024)    Social Connections ([facility] HRSN)     If for any reason you need help with day-to-day activities such as bathing, preparing meals, shopping, managing finances, etc., do you get the help you need?: Not on file   Intimate Partner Violence: Not At Risk (2024)    Received from [facility] and [employer] [facility]    Interpersonal Safety     Do you feel physically or emotionally unsafe where you currently live?: No     Within the past 12 months, have you been hit, slapped, kicked or otherwise physically hurt by anyone? : No     Within the  past 12 months, have you been hit, slapped, kicked or otherwise physically hurt by anyone? : No   Housing Stability: Not At Risk (7/24/2024)    Received from [facility]    Housing/Utilities     Are you worried about losing your housing? : No     Within the past 12 months, have you ever stayed: outside, in a car, in a tent, in an overnight shelter, or temporarily in someone else's home (i.e. couch-surfing)?: No     Within the past 12 months, have you been unable to get utilities (heat, electricity) when it was really needed?: No       Family History:   Family History   Problem Relation Age of Onset    Diabetes Mother     Heart Disease Mother     Heart Disease Father     Heart Attack Father     Kidney Disease Sister         stage 4-kidney failure    Cancer Sister     Heart Disease Sister     Obesity Sister     Cancer Sister     Heart Disease Sister     Obesity Sister     Alcohol Abuse Brother        Review of Systems:   Pertinent positives stated above in HPI. All other 10 systems were reviewed and were negative.     Physical exam:   Constitutional:  VITALS:  BP (!) 152/77   Pulse 79   Temp 98.2 °F (36.8 °C) (Oral)   Resp 19   Ht 1.753 m (5' 9\")   Wt 96.2 kg (212 lb)   SpO2 100%   BMI 31.31 kg/m²   Gen: alert, awake  Neck: No JVD  Skin: Unremarkable  Cardiovascular:  S1, S2 without m/r/g   Respiratory: CTA B without w/r/r; respiratory effort normal  Abdomen:  soft, nt, nd,   Extremities: + lower extremity edema  Neuro/Psy: AAoriented times 3 ; moves all 4 ext  Dialysis access left IJ TDC    Data/  Recent Labs     10/21/24  0720   WBC 9.4   HGB 11.1*   HCT 36.1*   MCV 84.9        Recent Labs     10/21/24  0745   *   K 5.0   CL 90*   CO2 26   GLUCOSE 168*   BUN 85*   CREATININE 10.2*   LABGLOM 5*       Assessment  -ESRD on HD TTS at [facility]    -CAD status post PCI on 10/21/2024 with IVUS of RCA with laser atherectomy/shockwave lithotripsy balloon angioplasty and

## 2024-10-21 NOTE — PROGRESS NOTES
Pt brought to PACU. Report obtained from OR RN and anesthesia. Pt placed on monitor NSR and O2 at 94% on 5L NC. Oral airway in place.

## 2024-10-21 NOTE — PROGRESS NOTES
CEPHALIC FISTULA    DIALYSIS FISTULA CREATION Left 03/27/2019    LIGATION  AV FISTULA performed by Brenden Rosario MD at [facility] OR    ENDOSCOPY, COLON, DIAGNOSTIC      ESOPHAGOGASTRODUODENOSCOPY  07/08/2024    ESOPHAGOGASTRODUODENOSCOPY BIOPSY    FOOT DEBRIDEMENT Right 05/26/2023    INCISION AND DEBRIDEMENT RIGHT FOOT WITH performed by Cely Rodriguez DPM at [facility] OR    FOOT DEBRIDEMENT Right 05/02/2024    RIGHT FOOT DEBRIDEMENT,  INCISION AND DRAINAGE,  BONE RESECTION,  GRAFT APPLICATION performed by Corrie Berg MD at [facility] OR    FOOT SURGERY Right 05/26/2023    RIGHT FIFTH RAY AMPUTATION performed by Cely Rodriguez DPM at [facility] OR    FOOT SURGERY Right 04/04/2024    RIGHT TRANSMETATARSAL AMPUTATION performed by Corrie Berg MD at [facility] OR    IR TUNNELED CATHETER PLACEMENT GREATER THAN 5 YEARS  03/21/2022    IR TUNNELED CATHETER PLACEMENT GREATER THAN 5 YEARS 3/21/2022 [facility] SPECIAL PROCEDURES    IR TUNNELED CATHETER PLACEMENT GREATER THAN 5 YEARS  04/21/2022    IR TUNNELED CATHETER PLACEMENT GREATER THAN 5 YEARS 4/21/2022 [place] SPECIAL PROCEDURES    IR TUNNELED CATHETER PLACEMENT GREATER THAN 5 YEARS  04/26/2022    IR TUNNELED CATHETER PLACEMENT GREATER THAN 5 YEARS 4/26/2022 [facility] SPECIAL PROCEDURES    IR TUNNELED CATHETER PLACEMENT GREATER THAN 5 YEARS  06/23/2022    IR TUNNELED CATHETER PLACEMENT GREATER THAN 5 YEARS 6/23/2022 [facility] SPECIAL PROCEDURES    OTHER SURGICAL HISTORY  02/01/2017    laparoscopic cholecystectomy with intraoperative cholangiogram    OTHER SURGICAL HISTORY  2018    PORT PLACEMENT  - vas cath    OTHER SURGICAL HISTORY Bilateral 06/26/2018    laprascopic peritoneal dialysis catheter placement    OTHER SURGICAL HISTORY Right 09/2018    peritoneal dialysis port placed on right side of abdomen    OTHER SURGICAL HISTORY  05/28/2019    PTA/Stenting R External Iliac artery    TX LAPS INSERTION TUNNELED INTRAPERITONEAL CATHETER N/A 09/21/2018    LAPAROSCOPIC PERITONEAL DIALYSIS CATHETER  REPLACEMENT performed by Remi Kincaid MD at [facility] OR    RECTAL SURGERY N/A 02/24/2022    PERINEAL ABCESS DRAINAGE performed by Remi Kincaid MD at [facility] OR    THORACENTESIS N/A 10/02/2022    THORACENTESIS ULTRASOUND performed by Charlie Esquivel MD at [facility] ENDOSCOPY    TONSILLECTOMY      UPPER GASTROINTESTINAL ENDOSCOPY  01/06/2016    UPPER GASTROINTESTINAL ENDOSCOPY  01/29/2017    possible candida, otherwise normal appearing    UPPER GASTROINTESTINAL ENDOSCOPY N/A 7/8/2024    ESOPHAGOGASTRODUODENOSCOPY BIOPSY performed by Maurizio Reyes MD at [facility] ENDOSCOPY    VASCULAR SURGERY  aprx 2 years ago    2 stents placed, each side of groin             Pre-Sedation:  Pre-Sedation Documentation and Exam:  I have assessed the patient and reviewed the H&P on the chart.    Prior History of Anesthesia Complications:   none    Modified Mallampati:  III (soft palate, base of uvula visible)    ASA Classification:  Class 4 - A patient with an incapacitating systemic disease that is a constant threat to life    Ernesto Scale:  Activity:  2 - Able to move 4 extremities voluntarily on command  Respiration:  2 - Able to breathe deeply and cough freely  Circulation:  2 - BP+/- 20mmHg of normal  Consciousness:  2 - Fully awake  Oxygen Saturation (color):  1 - Needs oxygen to maintain oxygen saturation >90%    Sedation/Anesthesia Plan:  Guard the patient's safety and welfare.  Minimize physical discomfort and pain.  Minimize negative psychological responses to treatment by providing sedation and analgesia and maximize the potential amnesia.  Patient to meet pre-procedure discharge plan.    Medication Planned:  As per anesthesiology service.      Patient is an appropriate candidate for plan of sedation:   yes      Electronically signed by Lamberto Pate MD on 10/21/2024 at 8:36 AM

## 2024-10-21 NOTE — RT PROTOCOL NOTE
RT Inhaler-Nebulizer Bronchodilator Protocol Note    There is a bronchodilator order in the chart from a provider indicating to follow the RT Bronchodilator Protocol and there is an “Initiate RT Inhaler-Nebulizer Bronchodilator Protocol” order as well (see protocol at bottom of note).    CXR Findings:  XR CHEST PORTABLE    Result Date: 10/21/2024  Persistent bibasilar opacities and small bilateral pleural effusions.       The findings from the last RT Protocol Assessment were as follows:   History Pulmonary Disease: Chronic pulmonary disease  Respiratory Pattern: Regular pattern and RR 12-20 bpm  Breath Sounds: Slightly diminished and/or crackles  Cough: Strong, spontaneous, non-productive  Indication for Bronchodilator Therapy: None  Bronchodilator Assessment Score: 4    Aerosolized bronchodilator medication orders have been revised according to the RT Inhaler-Nebulizer Bronchodilator Protocol below.    Respiratory Therapist to perform RT Therapy Protocol Assessment initially then follow the protocol.  Repeat RT Therapy Protocol Assessment PRN for score 0-3 or on second treatment, BID, and PRN for scores above 3.    No Indications - adjust the frequency to every 6 hours PRN wheezing or bronchospasm, if no treatments needed after 48 hours then discontinue using Per Protocol order mode.     If indication present, adjust the RT bronchodilator orders based on the Bronchodilator Assessment Score as indicated below.  Use Inhaler orders unless patient has one or more of the following: on home nebulizer, not able to hold breath for 10 seconds, is not alert and oriented, cannot activate and use MDI correctly, or respiratory rate 25 breaths per minute or more, then use the equivalent nebulizer order(s) with same Frequency and PRN reasons based on the score.  If a patient is on this medication at home then do not decrease Frequency below that used at home.    0-3 - enter or revise RT bronchodilator order(s) to equivalent RT

## 2024-10-21 NOTE — PROCEDURES
CARDIAC CATHETERIZATION REPORT     Procedure Date:  10/21/2024  Patient Name: Igor Ann  MRN: [id number] : 1970      INDICATION     HF  Abnl stress test    PROCEDURES PERFORMED       Coronary angiogam  Coronary cath    Temporary transvenous pacer insertion and removal    IVUS of RCA    Laser atherectomy of RCA  Shockwave coronary lithotripsy of RCA  Drug-coated balloon angioplasty of RCA  PCI of RCA with 2 drug-eluting stents    IVUS of left main  IVUS of LAD          PROCEDURE DESCRIPTION   Immediately before procedure, sedation assessment was performed again and prior findings as per sedation note (see that note for details) are unchanged. Risks/benefits/alternatives/outcomes were discussed with patient and/or family and informed consent was obtained.  Using fluoroscopic and ultrasound guidance with a micropuncture kit, a 7 [place] sheath was placed into the right common femoral artery and 8 [place] sheath was placed into the right common femoral vein.  These sheaths were upsized to longer versions of these with a 7 [place] 45 cm Terumo destination the artery and a 8 [place] 23 cm in the vein.  Temporary transvenous pacer was inserted through the venous sheath and placed in the right ventricle and backup mode and then was removed at the end of the procedure.  Arterial sheath as well as venous sheath were secured in place at the end of the procedure for later removal.  The arterial sheath was downsized to a 23 cm sheath at the end of the procedure.  Diagnostic angiograms were obtained with guide catheters including a 7 [place] AL 0.75 guide catheter for RCA and 6 [place] VL 3.5 for the left system.  At the conclusion of the procedure, a TR band was placed over the puncture site and hemostasis was obtained.  There were no immediate complications.  Sedation was provided by the anesthesiology service.   <20cc EBL.  I called patient's wife, gave her update on findings and plans.      FINDINGS   See  diagnostic cath for full details on initial findings, would add that IVUS showed a minimal luminal diameter of the RCA with 4 mm and there is severe disease noted in the mid-- distal segments with 80% stenosis and 360 degree arc of calcium with significant in-stent restenosis prickly in the mid vessel.    IVUS was also performed of left main and LAD which showed mild disease in the left main, there did appear to be significant proximal LAD disease with a stent to that protrude from the first diagonal into the LAD, there is also mid vessel disease that was felt to be more moderate on IVUS and the minimal luminal diameter of the mid-- distal stenosis 2 to 2.5 mm.  In the proximal segment that was 3 to 3.5 mm.  There was significant calcification noted.    PERCUTANEOUS INTERVENTION DESCRIPTION     Patient was loaded with Plavix 600 mg before the start of the procedure.  Heparin was used for anticoagulation during the procedure.  A 7 Indonesian AL 0.75 guide cath was used to intubate the RCA.  Workhorse wire was advanced partially into the RCA but could not be advanced distally and as such a microcatheter was taken to facilitate advancement of the workhorse wire distally.  Workhorse wire was then removed in favor of a wiggle wire.  IVUS was performed and is as noted above.  Attention then turned towards laser atherectomy and laser atherectomy from [employer] was utilized at 60/60 as well as 80/80 and then shockwave coronary lithotripsy was performed with 4 mm balloons in the [id number] segments.  Lesions were then further dilated with 4 mm cutting balloon.  There was severe distal calcification and an area that we had not been previously stented and as such these areas were stented with [employer] Megatron 4 x 32 mm as well as 4 x 24 mm drug-eluting stents.  Stents were postdilated with 4 mm noncompliant balloon.  Drug-coated balloon was used to treat the in-stent restenosis area in the mid vessel with a 4 mm

## 2024-10-21 NOTE — PROGRESS NOTES
Report given to patients nurse Laura RN. Pt transferred to room 440. CMU called and monitor is on and verified. Groin sheaths remain intact in right groin. No bleeding no hematoma.

## 2024-10-21 NOTE — CONSULTS
[facility] Consult Note           Reason for Consult:  ESKD on HD  Requesting Physician:  Dr. Sandoval      Chief Complaint:  CP and SOB  History Obtained From:  patient, electronic medical record    History of Present Ilness:    Igor Ann is a 54 year old male with history of ESKD on HD that presented to the hospital for planned left heart catheterization after abnormal stress test. Patient underwent LHC 10/21/24.    We have been asked to see Mr. Ann to manage ESKD and dialysis.  He is a resident of [facility] where he has HD T/T/S.     - Patient seen and examined at bedside  - s/p [facility]; Successful laser atherectomy of RCA, shockwave coronary lithotripsy of RCA,  And successful drug-coated balloon angioplasty of RCA  - He feels tired and hungery after his procedure but does not complain of any CP or SOB.  Is on 4L NC supplemental O2 (wears O2 at baseline).   - No family at bedside  - Notes and Pmhx reviewed    Past Medical History:        Diagnosis Date    Ambulatory dysfunction     walker for long distances, SOB with distance    Aortic stenosis     echo 2017    Arthritis     hands and hips    Asthma     Bilateral hilar adenopathy syndrome 06/03/2013    CAD (coronary artery disease)     Dr. Javier Chanel [facility]    Cardiomyopathy ([facility]) 04/19/2019    EF= 43%    CHF, EF 35-40% (July 2024)     Chronic pain     COPD (chronic obstructive pulmonary disease) ([facility])     pulmonology Dr. Batista    CVA (cerebral vascular accident) ([facility]) 05/21/2017    Depression     Diabetes mellitus ([facility])     borderline    Difficult intravenous access     Emphysema of lung ([facility])     ESRD (end stage renal disease) on dialysis ([facility])     MWF    Fear of needles     Gastric ulcer     GERD (gastroesophageal reflux disease)     HD, T/R/Sa     Heart valve problem     bicuspic valve    History of spinal fracture     work incident    History of transcatheter aortic valve  for any reason you need help with day-to-day activities such as bathing, preparing meals, shopping, managing finances, etc., do you get the help you need?: Not on file   Intimate Partner Violence: Not At Risk (7/24/2024)    Received from [facility]    Interpersonal Safety     Do you feel physically or emotionally unsafe where you currently live?: No     Within the past 12 months, have you been hit, slapped, kicked or otherwise physically hurt by anyone? : No     Within the past 12 months, have you been hit, slapped, kicked or otherwise physically hurt by anyone? : No   Housing Stability: Not At Risk (7/24/2024)    Received from [facility] and [facility]    Housing/Utilities     Are you worried about losing your housing? : No     Within the past 12 months, have you ever stayed: outside, in a car, in a tent, in an overnight shelter, or temporarily in someone else's home (i.e. couch-surfing)?: No     Within the past 12 months, have you been unable to get utilities (heat, electricity) when it was really needed?: No       Family History:   Family History   Problem Relation Age of Onset    Diabetes Mother     Heart Disease Mother     Heart Disease Father     Heart Attack Father     Kidney Disease Sister         stage 4-kidney failure    Cancer Sister     Heart Disease Sister     Obesity Sister     Cancer Sister     Heart Disease Sister     Obesity Sister     Alcohol Abuse Brother        Review of Systems:   12 point review of systems were reviewed and were negative unless mentioned above.    Physical exam:   Constitutional:  VITALS:  BP (!) 156/58   Pulse 81   Temp 97.8 °F (36.6 °C) (Oral)   Resp 18   Ht 1.753 m (5' 9\")   Wt 96.2 kg (212 lb)   SpO2 93%   BMI 31.31 kg/m²   CURRENT TEMPERATURE:  Temp: 97.8 °F (36.6 °C)    Gen: alert, awake, NAD  Skin: no rash, turgor wnl  Neck: no bruits or jvd noted  Cardiovascular: RRR, +S1/S2 without m/r/g  Respiratory: Diminished-B

## 2024-10-21 NOTE — H&P
Hospital Medicine History & Physical      Date of Admission: 10/21/2024    Date of Service:  Pt seen/examined on 10/22/2024    [x]Admitted to Inpatient with expected LOS greater than two midnights due to medical therapy.  []Placed in Observation status.    Chief Admission Complaint:  CAD    Presenting Admission History:      54 y.o. male who presented to [facility] with CAD.  PMHx significant for CAD, ESRD, CHF, DM2. He also had recent right foot TMA. He was evaluated here in July for multifocal CAD and there was discussion about CABG vs high risk PCI. He was deemed not a CABG candidate. He requested transfer to [facility] for second opinion to which he was also deemed not a candidate for CABG. He now presents for elective high risk PCI of his RCA with Dr. Pate on 10/21/22. Hospital Medicine was asked to assist with his management.     Today he reports some epigastric/chest pain, which feels like his usual heartburn. Denies any SOB.  He has completed HD. This afternoon he is now complaining of cough that is productive of bright red blood. After coughing spells, he is having some emesis. The emesis appeared free of any blood.     Assessment/Plan:      CAD s/p PCI to RCA: Continue DAPT, BB, Statin.     Hemoptysis: Started 10/22/24. Currently low volume so ok to continue DAPT for now. Will check CXR.     ESRD  -HD Tu/Th/Sa  -Nephrology consulted   -Monitor renal function panel     CHF - chronic systolic failure w/ reduced EF 40-50% by Echo dated 7/5/24.  Likely due to hypertensive heart disease.  Patient is euvolemic w/ no evidence of acute decompensation.  Continue current medical mgt.      Diabetes Mellitus, Type 2: Controlled. Continue basal-bolus Insulin regimen. Monitor serial finger stick blood sugars given risk for toxicity/hypoglycemia due to Insulin and adjust regimen as needed.     Anemia: In setting of CKD. Now with some low volume hemoptysis. Monitor CBC.     Depression / Anxiety    Physical Exam  to be best treated medically there was 0% residual stenosis.  There was SCARLETT 3 flow before and after PCI.   Attention then turned towards the left side and equipment was removed from the right side and a 6 Mexican VL 3.5 guide cath was used to intubate the left main.  IVUS was performed and is as noted above.  There was difficulty with passing wires and IVUS catheter and as such the initial workhorse wire was taken but was ultimately exchanged out for a wiggle wire with use of a microcatheter and then IVUS was able to be performed.  As there was complex disease in the LAD with more moderate disease in the mid-- distal segment, is felt that this to be best treated medically at this juncture given complexity of overall coronary disease.  Assessment of this could be reconsidered at a later date pending recovery from the RCA PCI. CONCLUSIONS: Successful laser atherectomy of RCA Successful shockwave coronary lithotripsy of RCA Successful drug-coated balloon angioplasty of RCA Borderline significant disease in LAD with stent protrusion of D1 into LAD.  Treat medically at this juncture, consider follow-up ischemia assessment/PCI pending recovery from this procedure. Admit to hospital service, patient will need dialysis tomorrow       PCP: Vonnie Cleveland APRN - NP    Past Medical History:        Diagnosis Date    Ambulatory dysfunction     walker for long distances, SOB with distance    Aortic stenosis     echo 2017    Arthritis     hands and hips    Asthma     Bilateral hilar adenopathy syndrome 06/03/2013    CAD (coronary artery disease)     Dr. Javier Chanel Heart    Cardiomyopathy ([facility]) 04/19/2019    EF= 43%    CHF, EF 35-40% (July 2024)     Chronic pain     COPD (chronic obstructive pulmonary disease) ([facility])     pulmonology Dr. Batista    CVA (cerebral vascular accident) ([facility]) 05/21/2017    Depression     Diabetes mellitus ([facility])     borderline    Difficult intravenous access     Emphysema of lung ([facility])     ESRD (end

## 2024-10-21 NOTE — ANESTHESIA POSTPROCEDURE EVALUATION
Department of Anesthesiology  Postprocedure Note    Patient: gIor Ann  MRN: [id number]  YOB: 1970  Date of evaluation: 10/21/2024    Procedure Summary       Date: 10/21/24 Room / Location: [facility] CATH LAB 1 / [facility] CARDIAC CATH LAB    Anesthesia Start: 0850 Anesthesia Stop: 1223    Procedures:       High risk percutaneous coronary intervention      Percutaneous coronary intervention      Insert temporary pacemaker      Intravascular ultrasound      Atherectomy coronary      Intravascular lithotripsy coronary Diagnosis:       CAD (coronary artery disease)      Abnormal findings on cardiac catheterization      (CAD (coronary artery disease) [I25.10])      (Abnormal findings on cardiac catheterization [R93.1])    Providers: Lambetro Pate MD Responsible Provider: Davion Alas MD    Anesthesia Type: general ASA Status: 4            Anesthesia Type: No value filed.    Ernesto Phase I:      Ernesto Phase II:      Anesthesia Post Evaluation    Patient location during evaluation: PACU  Level of consciousness: lethargic  Airway patency: patent  Nausea & Vomiting: no vomiting  Cardiovascular status: blood pressure returned to baseline  Respiratory status: acceptable  Hydration status: stable  Pain management: adequate    There were no known notable events for this encounter.

## 2024-10-22 LAB
ALBUMIN SERPL-MCNC: 3.1 G/DL (ref 3.4–5)
ANION GAP SERPL CALCULATED.3IONS-SCNC: 18 MMOL/L (ref 3–16)
ANION GAP SERPL CALCULATED.3IONS-SCNC: 20 MMOL/L (ref 3–16)
BUN SERPL-MCNC: 51 MG/DL (ref 7–20)
BUN SERPL-MCNC: 98 MG/DL (ref 7–20)
CALCIUM SERPL-MCNC: 9.1 MG/DL (ref 8.3–10.6)
CALCIUM SERPL-MCNC: 9.1 MG/DL (ref 8.3–10.6)
CHLORIDE SERPL-SCNC: 91 MMOL/L (ref 99–110)
CHLORIDE SERPL-SCNC: 93 MMOL/L (ref 99–110)
CO2 SERPL-SCNC: 19 MMOL/L (ref 21–32)
CO2 SERPL-SCNC: 19 MMOL/L (ref 21–32)
CREAT SERPL-MCNC: 10.9 MG/DL (ref 0.9–1.3)
CREAT SERPL-MCNC: 6.7 MG/DL (ref 0.9–1.3)
DEPRECATED RDW RBC AUTO: 18.5 % (ref 12.4–15.4)
EKG ATRIAL RATE: 93 BPM
EKG ATRIAL RATE: 93 BPM
EKG DIAGNOSIS: NORMAL
EKG DIAGNOSIS: NORMAL
EKG P AXIS: 78 DEGREES
EKG P AXIS: 78 DEGREES
EKG P-R INTERVAL: 172 MS
EKG P-R INTERVAL: 172 MS
EKG Q-T INTERVAL: 366 MS
EKG Q-T INTERVAL: 366 MS
EKG QRS DURATION: 100 MS
EKG QRS DURATION: 100 MS
EKG QTC CALCULATION (BAZETT): 455 MS
EKG QTC CALCULATION (BAZETT): 455 MS
EKG R AXIS: 72 DEGREES
EKG R AXIS: 72 DEGREES
EKG T AXIS: -30 DEGREES
EKG T AXIS: -30 DEGREES
EKG VENTRICULAR RATE: 93 BPM
EKG VENTRICULAR RATE: 93 BPM
GFR SERPLBLD CREATININE-BSD FMLA CKD-EPI: 5 ML/MIN/{1.73_M2}
GFR SERPLBLD CREATININE-BSD FMLA CKD-EPI: 9 ML/MIN/{1.73_M2}
GLUCOSE BLD-MCNC: 105 MG/DL (ref 70–99)
GLUCOSE BLD-MCNC: 158 MG/DL (ref 70–99)
GLUCOSE BLD-MCNC: 221 MG/DL (ref 70–99)
GLUCOSE BLD-MCNC: 92 MG/DL (ref 70–99)
GLUCOSE SERPL-MCNC: 137 MG/DL (ref 70–99)
GLUCOSE SERPL-MCNC: 154 MG/DL (ref 70–99)
HAV IGM SERPL QL IA: NORMAL
HBV CORE IGM SERPL QL IA: NORMAL
HBV SURFACE AB SERPL IA-ACNC: 940 MIU/ML
HBV SURFACE AG SERPL QL IA: NORMAL
HCT VFR BLD AUTO: 36.1 % (ref 40.5–52.5)
HCV AB SERPL QL IA: NORMAL
HGB BLD-MCNC: 11 G/DL (ref 13.5–17.5)
MAGNESIUM SERPL-MCNC: 2.7 MG/DL (ref 1.8–2.4)
MCH RBC QN AUTO: 26.1 PG (ref 26–34)
MCHC RBC AUTO-ENTMCNC: 30.6 G/DL (ref 31–36)
MCV RBC AUTO: 85.4 FL (ref 80–100)
PERFORMED ON: ABNORMAL
PERFORMED ON: NORMAL
PHOSPHATE SERPL-MCNC: 5.2 MG/DL (ref 2.5–4.9)
PLATELET # BLD AUTO: 238 K/UL (ref 135–450)
PMV BLD AUTO: 8.1 FL (ref 5–10.5)
POTASSIUM SERPL-SCNC: 6.2 MMOL/L (ref 3.5–5.1)
POTASSIUM SERPL-SCNC: 7 MMOL/L (ref 3.5–5.1)
POTASSIUM SERPL-SCNC: ABNORMAL MMOL/L (ref 3.5–5.1)
RBC # BLD AUTO: 4.22 M/UL (ref 4.2–5.9)
REASON FOR REJECTION: NORMAL
REJECTED TEST: NORMAL
SODIUM SERPL-SCNC: 130 MMOL/L (ref 136–145)
SODIUM SERPL-SCNC: 130 MMOL/L (ref 136–145)
WBC # BLD AUTO: 9.4 K/UL (ref 4–11)

## 2024-10-22 PROCEDURE — 85027 COMPLETE CBC AUTOMATED: CPT

## 2024-10-22 PROCEDURE — 2580000003 HC RX 258: Performed by: INTERNAL MEDICINE

## 2024-10-22 PROCEDURE — 94761 N-INVAS EAR/PLS OXIMETRY MLT: CPT

## 2024-10-22 PROCEDURE — 2700000000 HC OXYGEN THERAPY PER DAY

## 2024-10-22 PROCEDURE — 93010 ELECTROCARDIOGRAM REPORT: CPT | Performed by: INTERNAL MEDICINE

## 2024-10-22 PROCEDURE — 6360000002 HC RX W HCPCS: Performed by: INTERNAL MEDICINE

## 2024-10-22 PROCEDURE — 90935 HEMODIALYSIS ONE EVALUATION: CPT

## 2024-10-22 PROCEDURE — 2500000003 HC RX 250 WO HCPCS: Performed by: INTERNAL MEDICINE

## 2024-10-22 PROCEDURE — 6370000000 HC RX 637 (ALT 250 FOR IP): Performed by: INTERNAL MEDICINE

## 2024-10-22 PROCEDURE — 83735 ASSAY OF MAGNESIUM: CPT

## 2024-10-22 PROCEDURE — 80074 ACUTE HEPATITIS PANEL: CPT

## 2024-10-22 PROCEDURE — 93005 ELECTROCARDIOGRAM TRACING: CPT | Performed by: INTERNAL MEDICINE

## 2024-10-22 PROCEDURE — 36415 COLL VENOUS BLD VENIPUNCTURE: CPT

## 2024-10-22 PROCEDURE — 80069 RENAL FUNCTION PANEL: CPT

## 2024-10-22 PROCEDURE — 84132 ASSAY OF SERUM POTASSIUM: CPT

## 2024-10-22 PROCEDURE — 99232 SBSQ HOSP IP/OBS MODERATE 35: CPT | Performed by: NURSE PRACTITIONER

## 2024-10-22 PROCEDURE — 2060000000 HC ICU INTERMEDIATE R&B

## 2024-10-22 PROCEDURE — 86706 HEP B SURFACE ANTIBODY: CPT

## 2024-10-22 RX ORDER — MIDODRINE HYDROCHLORIDE 5 MG/1
TABLET ORAL
Status: DISPENSED
Start: 2024-10-22 | End: 2024-10-22

## 2024-10-22 RX ORDER — MIDODRINE HYDROCHLORIDE 5 MG/1
10 TABLET ORAL
Status: COMPLETED | OUTPATIENT
Start: 2024-10-22 | End: 2024-10-22

## 2024-10-22 RX ORDER — CALCIUM CARBONATE 500 MG/1
750 TABLET, CHEWABLE ORAL 3 TIMES DAILY PRN
Status: DISCONTINUED | OUTPATIENT
Start: 2024-10-22 | End: 2024-10-23 | Stop reason: HOSPADM

## 2024-10-22 RX ADMIN — OXYCODONE 10 MG: 5 TABLET ORAL at 13:19

## 2024-10-22 RX ADMIN — QUETIAPINE FUMARATE 50 MG: 25 TABLET ORAL at 20:23

## 2024-10-22 RX ADMIN — PANTOPRAZOLE SODIUM 40 MG: 40 TABLET, DELAYED RELEASE ORAL at 06:34

## 2024-10-22 RX ADMIN — BUSPIRONE HYDROCHLORIDE 10 MG: 5 TABLET ORAL at 07:42

## 2024-10-22 RX ADMIN — CALCIUM ACETATE 1334 MG: 667 CAPSULE ORAL at 13:19

## 2024-10-22 RX ADMIN — OXYCODONE 10 MG: 5 TABLET ORAL at 07:42

## 2024-10-22 RX ADMIN — CALCIUM ACETATE 1334 MG: 667 CAPSULE ORAL at 07:42

## 2024-10-22 RX ADMIN — ONDANSETRON 4 MG: 2 INJECTION INTRAMUSCULAR; INTRAVENOUS at 13:19

## 2024-10-22 RX ADMIN — Medication 10 ML: at 20:24

## 2024-10-22 RX ADMIN — CALCIUM ACETATE 1334 MG: 667 CAPSULE ORAL at 20:23

## 2024-10-22 RX ADMIN — ATORVASTATIN CALCIUM 40 MG: 40 TABLET, FILM COATED ORAL at 20:23

## 2024-10-22 RX ADMIN — CLOPIDOGREL BISULFATE 75 MG: 75 TABLET ORAL at 07:42

## 2024-10-22 RX ADMIN — BUSPIRONE HYDROCHLORIDE 10 MG: 5 TABLET ORAL at 13:19

## 2024-10-22 RX ADMIN — CALCIUM CARBONATE 750 MG: 500 TABLET, CHEWABLE ORAL at 13:22

## 2024-10-22 RX ADMIN — Medication 10 ML: at 07:43

## 2024-10-22 RX ADMIN — MIDODRINE HYDROCHLORIDE 10 MG: 5 TABLET ORAL at 09:46

## 2024-10-22 RX ADMIN — DULOXETINE HYDROCHLORIDE 60 MG: 60 CAPSULE, DELAYED RELEASE ORAL at 07:42

## 2024-10-22 RX ADMIN — TRAZODONE HYDROCHLORIDE 50 MG: 50 TABLET ORAL at 20:23

## 2024-10-22 RX ADMIN — HEPARIN SODIUM 5000 UNITS: 5000 INJECTION INTRAVENOUS; SUBCUTANEOUS at 20:24

## 2024-10-22 RX ADMIN — PANTOPRAZOLE SODIUM 40 MG: 40 TABLET, DELAYED RELEASE ORAL at 18:15

## 2024-10-22 RX ADMIN — PREGABALIN 25 MG: 25 CAPSULE ORAL at 06:34

## 2024-10-22 RX ADMIN — HEPARIN SODIUM 5000 UNITS: 5000 INJECTION INTRAVENOUS; SUBCUTANEOUS at 07:43

## 2024-10-22 RX ADMIN — ASPIRIN 81 MG 81 MG: 81 TABLET ORAL at 07:42

## 2024-10-22 RX ADMIN — VENLAFAXINE HYDROCHLORIDE 75 MG: 37.5 CAPSULE, EXTENDED RELEASE ORAL at 07:42

## 2024-10-22 RX ADMIN — INSULIN GLARGINE 15 UNITS: 100 INJECTION, SOLUTION SUBCUTANEOUS at 20:23

## 2024-10-22 RX ADMIN — BUSPIRONE HYDROCHLORIDE 10 MG: 5 TABLET ORAL at 20:23

## 2024-10-22 RX ADMIN — HEPARIN SODIUM 5000 UNITS: 5000 INJECTION INTRAVENOUS; SUBCUTANEOUS at 13:19

## 2024-10-22 ASSESSMENT — PAIN SCALES - WONG BAKER
WONGBAKER_NUMERICALRESPONSE: NO HURT

## 2024-10-22 ASSESSMENT — PAIN SCALES - GENERAL
PAINLEVEL_OUTOF10: 6
PAINLEVEL_OUTOF10: 0
PAINLEVEL_OUTOF10: 7

## 2024-10-22 NOTE — CARE COORDINATION
Writer attempted to assess pt, out of room at HD.  Writer called [facility] to verify pt is LTC there and asked about barriers to return, awaiting call back.  LORETTA Pittman     [id number] Addendum:  [facility]/DEEP updated writer, pt on bed hold and can return anytime.  LORETTA Pittman

## 2024-10-22 NOTE — PLAN OF CARE
Problem: Discharge Planning  Goal: Discharge to home or other facility with appropriate resources  Outcome: Progressing     Problem: Chronic Conditions and Co-morbidities  Goal: Patient's chronic conditions and co-morbidity symptoms are monitored and maintained or improved  Outcome: Progressing     Problem: Pain  Goal: Verbalizes/displays adequate comfort level or baseline comfort level  Outcome: Progressing     Problem: Skin/Tissue Integrity  Goal: Absence of new skin breakdown  Description: 1.  Monitor for areas of redness and/or skin breakdown  2.  Assess vascular access sites hourly  3.  Every 4-6 hours minimum:  Change oxygen saturation probe site  4.  Every 4-6 hours:  If on nasal continuous positive airway pressure, respiratory therapy assess nares and determine need for appliance change or resting period.  Outcome: Progressing     Problem: ABCDS Injury Assessment  Goal: Absence of physical injury  Outcome: Progressing     Problem: Safety - Adult  Goal: Free from fall injury  Outcome: Progressing

## 2024-10-22 NOTE — PROGRESS NOTES
[facility] Progress Note           Subjective/   54 y.o. year old male who we are seeing in consultation for ESKD on HD.     - Patient seen and examined in dialysis.  He says he feels tired today.  No complaints of CP or SOB.   - On 4L NC supplemental O2 (wears O2 at baseline).   - K this morning of 7.0 (possible hemolyzed?)  - Continues to be hypertensive, SBPs 140-150s  - No family at bedside  - Notes and Pmhx reviewed  - s/p LHC on 10/21/24    Objective/   GEN:  Chronically ill, BP (!) 150/81   Pulse 79   Temp 97.9 °F (36.6 °C)   Resp 16   Ht 1.753 m (5' 9\")   Wt 98.9 kg (218 lb 0.6 oz)   SpO2 100%   BMI 32.20 kg/m²     Gen: alert, awake, NAD  Skin: no rash, turgor wnl  Neck: no bruits or jvd noted  Cardiovascular: RRR, +S1/S2 without m/r/g  Respiratory: Diminished-B without w/r/r; respiratory effort normal  Abdomen:  +bs, soft, nt, nd  Ext: +2 lower extremity edema  Psychiatric: mood and affect appropriate; judgement and insight intact  Access: L SC TDC    Data/  Recent Labs     10/21/24  0720 10/22/24  0726   WBC 9.4 9.4   HGB 11.1* 11.0*   HCT 36.1* 36.1*   MCV 84.9 85.4    238     Recent Labs     10/21/24  0745 10/22/24  0511 10/22/24  0726   * 130*  --    K 5.0 7.0* 6.2*   CL 90* 91*  --    CO2 26 19*  --    GLUCOSE 168* 137*  --    PHOS  --  5.2*  --    MG  --  2.70*  --    BUN 85* 98*  --    CREATININE 10.2* 10.9*  --    LABGLOM 5* 5*  --        Assessment/Plan:     ESKD on HD  - T/T/S HD Schedule at [facility]   - TW appears 93kg from notes, 96.2kg today   - Hypervolemic, +2 edema   - Na 130, K 7.0   - Access: L SC TDC  - Seen on HD  Plan:   - HD today   - Will start with 2K bath for first hour then transition to 1K bath to finished to lower K.  - Will recheck BMP 3 hours after HD  - UF as tolerated with target weight of 93kg  - will keep T/T/S schedule while inpatient  - daily labs  - Continue Renal/cardiac diet     Anemia of Chronic Disease  - stable hgb  11.0 today   - consider restarting DAVON with HD later this week     Hypertension  - above goal  - UF as tolerated   - Takes Imdur and Metoprolol at home     Coronary Artery Disease   - per cardiology   - s/p LHC 10/21/24     CHF  - per cardiology      DMII  - per primary team     ____________________________________  JAY Wilkerson - NP  [facility]  [email or web address]  Office: [phone]

## 2024-10-22 NOTE — CARE COORDINATION
Writer confirmed with pt, he lives at [facility], will return at discharge, which will not be today.  Per [facility], pt has bed hold and has no barriers to return.  CM will continue to follow pt's progress and coordinate discharge arrangements.  REINALDO Pittman-NAVJOT

## 2024-10-22 NOTE — PROGRESS NOTES
Treatment time: 3.5 hrs     Net UF: 4000 ml    Pre weight: 98.9 kg  Post weight: 94.9 kg  EDW: 93 kg    Access used: Lt CW TDC  Access function: Well tolerated, 350 BFR    Medications or blood products given: Midodrine 10 mg    Regular outpatient schedule: TTS    Summary of response to treatment: Pt tolerated well. Pt remained stable throughout entire treatment and upon exiting the hemodialysis suite. Report given to Mikey Kirkland RN

## 2024-10-22 NOTE — PROGRESS NOTES
[facility]  Neph[facility] follow-up [facility]           Reason for Consult: ESRD  Requesting Physician:  Dr. Rio Sharma/interval history  Seen and examined on HD with 4 L net UF goal, midodrine for hypotension  Potassium on hemolyzed sample that was repeated was at 6.2    ROS: No chest pain/shortness of breath/fever/nausea/vomiting  PSFH: No visitor    Scheduled Meds:   midodrine        sodium chloride flush  5-40 mL IntraVENous 2 times per day    clopidogrel  75 mg Oral Daily    aspirin  81 mg Oral Daily    atorvastatin  40 mg Oral Nightly    busPIRone  10 mg Oral TID    calcium acetate  2 capsule Oral TID    DULoxetine  60 mg Oral Daily    insulin glargine  15 Units SubCUTAneous Nightly    pantoprazole  40 mg Oral BID AC    pregabalin  25 mg Oral Daily    QUEtiapine  50 mg Oral Nightly    traZODone  50 mg Oral Daily    venlafaxine  75 mg Oral Daily    sodium chloride flush  5-40 mL IntraVENous 2 times per day    insulin lispro  0-8 Units SubCUTAneous 4x Daily AC & HS    heparin (porcine)  5,000 Units SubCUTAneous TID     Continuous Infusions:   sodium chloride      dextrose      sodium chloride       PRN Meds:.midodrine, sodium chloride flush, sodium chloride, acetaminophen, ipratropium 0.5 mg-albuterol 2.5 mg, oxyCODONE HCl, glucose, dextrose bolus **OR** dextrose bolus, glucagon (rDNA), dextrose, sodium chloride flush, sodium chloride, ondansetron **OR** ondansetron, polyethylene glycol    History of Present Illness on 10/21/2024:    54 y.o. yo male with PMH of  ESRD, CAD status post PCI, CHF, COPD, HTN, DM, CVA and HTN, PAD status post iliac stents, chronic respiratory failure on 3 to 4 L of oxygen at baseline who is admitted after planned left heart catheterization due to abnormal stress test  He is on baseline oxygen need; labs are stable    Physical exam:   Constitutional:  VITALS:  BP (!) 150/81   Pulse 79   Temp 97.9 °F (36.6 °C)   Resp 16   Ht 1.753 m (5' 9\")   Wt 98.9 kg (218 lb 0.6 oz)   SpO2 100%

## 2024-10-22 NOTE — PROGRESS NOTES
[facility]   Daily Progress Note      Admit Date:  10/21/2024    Reason for follow up visit: CAD    CC: \"I'm doing okay.\"    53 y/o male with PMH notable for CAD/PATRICIA to LAD in 2015,PATRICIA to ramus, PATRICIA to LCX and PATRICIA to RCA previously, Aortic stenosis and S/P TAVR in 10/2019, HFrEF, ischemic cardiomyopathy, HTN, PAF, PVD (PTA and stent to REIA on 5/2019), ESRD, CVA, DM, ZAINAB, COPD on home O2 and noncompliance admitted for planned PCI. He was admitted in July 2024 with chest pain and LHC showed MV disease and felt to be better suited for PCI of RCA and medical management of residual CAD. Echo showed LVEF 35-40%. Evaluated by CTS and deemed not a candidate for CABG d/t excessive risk. On 10/21/24 he underwent laser atherectomy, shockwave coronary lithotripsy and drug coated balloon angioplasty of RCA. He was noted to have borderline significant disease in LAD with stent protrusion Dg 1 into LAD. Treat medically at this point and consider follow up ischemia assessment/PCI pending recovery.        Interval History:  Pt. seen and examined; records reviewed  BP reviewed. Remains on O2 @ 3L  Seen in dialysis this AM  Denies chest pain, SOB, cough, palpitations or dizziness    Subjective:  Pt with no acute overnight cardiac events.   Pt poor historian    Objective:   BP (!) 150/81   Pulse 79   Temp 97.9 °F (36.6 °C)   Resp 16   Ht 1.753 m (5' 9\")   Wt 98.9 kg (218 lb 0.6 oz)   SpO2 100%   BMI 32.20 kg/m²     Intake/Output Summary (Last 24 hours) at 10/22/2024 0919  Last data filed at 10/21/2024 2102  Gross per 24 hour   Intake 10 ml   Output 10 ml   Net 0 ml     Wt Readings from Last 3 Encounters:   10/22/24 98.9 kg (218 lb 0.6 oz)   08/13/24 96.4 kg (212 lb 9.6 oz)   07/09/24 96 kg (211 lb 10.3 oz)       Physical Exam:  General: In no acute distress. Awake, alert, and oriented X 3. Resting in bed.  HEENT: Sclerae anicteric. Normocephalic  Skin:  Warm and dry.    Neck:  Supple. No JVD appreciated.  Chest:  35 - 40%. EF by 2D Simpsons Biplane is 41%. Left ventricle is moderately dilated. Mildly increased wall thickness. Findings consistent with mild concentric hypertrophy.  Difficult to fully evaluate LV function due to poor endocardial visualization, if appears to be more reduced on original images, images with contrast show improved function. Global hypokinesis present  With regional variation with predominantly apical hypokinesis Grade II diastolic dysfunction with increased LAP.    Aortic Valve: Langford Leyda 3 appropriately positioned transcatheter bioprosthetic valve with a size of 29 mm. AV mean gradient is 3 mmHg.  Annulus is thickened    Left Atrium: Left atrium is moderately dilated.    Pericardium: Small (<1 cm) localized pericardial effusion present around the lateral and left ventricle. No indication of cardiac tamponade.    Image quality is poor. Contrast used: Definity.    Echo 3/2023:   The left ventricular systolic function is severely reduced with an ejection   fraction of 25-30 %.   There is hypokinesis of the inferior, basal inferior, apex, apical anterior   and anterolateral walls.   Left ventricular cavity size is mildly dilated with normal left ventricular   wall thickness.   Grade I diastolic dysfunction with normal filling pressure.   No changes noted from previous echo on 12-9-2022 in left ventricular   function.   Mild mitral and tricuspid regurgitation.   Right ventricular systolic function is mildly reduced .   Systolic pulmonic artery pressure (SPAP) is estimated at 40 mmHg consistent   with mild pulmonary hypertension (Right atrial pressure of 3 mmHg).      STRESS TESTS:  7/5/2024 Stress-Myoview:    Stress Combined Conclusion: This is an abnormal pharmacological myocardial perfusion study. Findings suggest a high risk of cardiac events.    Perfusion Comments: LV perfusion is abnormal.    Perfusion Defect: There are multiple perfusion defects. There is a moderate sized perfusion defect

## 2024-10-22 NOTE — PROGRESS NOTES
[email or web address]  Nephrology Follow up Note           Reason for Consult: ESRD  Requesting Physician:  Dr. Lawrence    Sub/interval history  Resting  HD on 7/6 w 1l net UF, post wt wt 93 kg  HD on 7/4 w 4l net UF, post wt 94.5 kg     ROS: No chest pain/shortness of breath/fever/nausea/vomiting  PSFH: No visitor    Scheduled Meds:   atorvastatin  80 mg Oral Nightly    epoetin siddharth-epbx  10,000 Units IntraVENous Once per day on Tue Thu Sat    insulin lispro  0-4 Units SubCUTAneous TID WC    insulin lispro  0-4 Units SubCUTAneous Nightly    sodium chloride flush  5-40 mL IntraVENous 2 times per day    aspirin  81 mg Oral Daily    busPIRone  15 mg Oral TID    calcium acetate  2 capsule Oral TID WC    insulin glargine  15 Units SubCUTAneous Nightly    DULoxetine  60 mg Oral Daily    metoprolol succinate  25 mg Oral Daily    QUEtiapine  50 mg Oral Nightly    sacubitril-valsartan  1 tablet Oral BID    venlafaxine  75 mg Oral Daily    traZODone  50 mg Oral Nightly     Continuous Infusions:   dextrose      sodium chloride       PRN Meds:.heparin (porcine), ipratropium 0.5 mg-albuterol 2.5 mg, LORazepam, midodrine, glucose, dextrose bolus **OR** dextrose bolus, glucagon (rDNA), dextrose, sodium chloride flush, sodium chloride, ondansetron **OR** ondansetron, acetaminophen **OR** acetaminophen, polyethylene glycol, oxyCODONE HCl    History of Present Illness on 7/4/2024:    54 y.o. yo male with PMH of ESRD, CAD status post PCI, CHF, COPD, HTN, DM, CVA and HTN, PAD status post iliac stents who is admitted for chest pain  Patient is over 5 kg from his target weight and nephrology has been asked to see him for dialysis needs  Given his cardiac history, cardiology was consulted and they are planning on stress test and echocardiogram in the morning    Physical exam:   Constitutional:  VITALS:  /78   Pulse 87   Temp 98.3 °F (36.8 °C) (Oral)   Resp 16   Ht 1.753 m (5' 9\")   Wt 93 kg (205 lb 0.4 oz)   SpO2 100%   BMI  5

## 2024-10-23 ENCOUNTER — APPOINTMENT (OUTPATIENT)
Dept: GENERAL RADIOLOGY | Age: 54
DRG: 323 | End: 2024-10-23
Attending: INTERNAL MEDICINE
Payer: MEDICARE

## 2024-10-23 VITALS
WEIGHT: 208.78 LBS | HEIGHT: 69 IN | TEMPERATURE: 97.7 F | HEART RATE: 87 BPM | RESPIRATION RATE: 16 BRPM | BODY MASS INDEX: 30.92 KG/M2 | OXYGEN SATURATION: 97 % | SYSTOLIC BLOOD PRESSURE: 131 MMHG | DIASTOLIC BLOOD PRESSURE: 49 MMHG

## 2024-10-23 LAB
ALBUMIN SERPL-MCNC: 3.5 G/DL (ref 3.4–5)
ANION GAP SERPL CALCULATED.3IONS-SCNC: 16 MMOL/L (ref 3–16)
BUN SERPL-MCNC: 66 MG/DL (ref 7–20)
CALCIUM SERPL-MCNC: 9.4 MG/DL (ref 8.3–10.6)
CHLORIDE SERPL-SCNC: 93 MMOL/L (ref 99–110)
CO2 SERPL-SCNC: 24 MMOL/L (ref 21–32)
CREAT SERPL-MCNC: 7.5 MG/DL (ref 0.9–1.3)
DEPRECATED RDW RBC AUTO: 18.4 % (ref 12.4–15.4)
GFR SERPLBLD CREATININE-BSD FMLA CKD-EPI: 8 ML/MIN/{1.73_M2}
GLUCOSE BLD-MCNC: 106 MG/DL (ref 70–99)
GLUCOSE BLD-MCNC: 114 MG/DL (ref 70–99)
GLUCOSE BLD-MCNC: 115 MG/DL (ref 70–99)
GLUCOSE SERPL-MCNC: 117 MG/DL (ref 70–99)
HCT VFR BLD AUTO: 34.8 % (ref 40.5–52.5)
HGB BLD-MCNC: 10.8 G/DL (ref 13.5–17.5)
MCH RBC QN AUTO: 26.3 PG (ref 26–34)
MCHC RBC AUTO-ENTMCNC: 30.9 G/DL (ref 31–36)
MCV RBC AUTO: 85 FL (ref 80–100)
PERFORMED ON: ABNORMAL
PHOSPHATE SERPL-MCNC: 5.5 MG/DL (ref 2.5–4.9)
PLATELET # BLD AUTO: 219 K/UL (ref 135–450)
PMV BLD AUTO: 8.2 FL (ref 5–10.5)
POC ACT LR: 168 SEC
POTASSIUM SERPL-SCNC: 5.3 MMOL/L (ref 3.5–5.1)
RBC # BLD AUTO: 4.09 M/UL (ref 4.2–5.9)
SODIUM SERPL-SCNC: 133 MMOL/L (ref 136–145)
WBC # BLD AUTO: 7.6 K/UL (ref 4–11)

## 2024-10-23 PROCEDURE — 2700000000 HC OXYGEN THERAPY PER DAY

## 2024-10-23 PROCEDURE — 80069 RENAL FUNCTION PANEL: CPT

## 2024-10-23 PROCEDURE — 6370000000 HC RX 637 (ALT 250 FOR IP): Performed by: INTERNAL MEDICINE

## 2024-10-23 PROCEDURE — 2580000003 HC RX 258: Performed by: INTERNAL MEDICINE

## 2024-10-23 PROCEDURE — 2500000003 HC RX 250 WO HCPCS: Performed by: INTERNAL MEDICINE

## 2024-10-23 PROCEDURE — 85027 COMPLETE CBC AUTOMATED: CPT

## 2024-10-23 PROCEDURE — 6370000000 HC RX 637 (ALT 250 FOR IP)

## 2024-10-23 PROCEDURE — 71045 X-RAY EXAM CHEST 1 VIEW: CPT

## 2024-10-23 PROCEDURE — 94761 N-INVAS EAR/PLS OXIMETRY MLT: CPT

## 2024-10-23 PROCEDURE — 6360000002 HC RX W HCPCS: Performed by: INTERNAL MEDICINE

## 2024-10-23 RX ORDER — OXYCODONE HYDROCHLORIDE 10 MG/1
10 TABLET ORAL EVERY 6 HOURS PRN
Qty: 12 TABLET | Refills: 0 | Status: SHIPPED | OUTPATIENT
Start: 2024-10-23 | End: 2024-10-30

## 2024-10-23 RX ORDER — CLOPIDOGREL BISULFATE 75 MG/1
75 TABLET ORAL DAILY
DISCHARGE
Start: 2024-10-24

## 2024-10-23 RX ADMIN — BUSPIRONE HYDROCHLORIDE 10 MG: 5 TABLET ORAL at 09:46

## 2024-10-23 RX ADMIN — PANTOPRAZOLE SODIUM 40 MG: 40 TABLET, DELAYED RELEASE ORAL at 05:43

## 2024-10-23 RX ADMIN — DULOXETINE HYDROCHLORIDE 60 MG: 60 CAPSULE, DELAYED RELEASE ORAL at 09:46

## 2024-10-23 RX ADMIN — PREGABALIN 25 MG: 25 CAPSULE ORAL at 05:43

## 2024-10-23 RX ADMIN — Medication 10 ML: at 09:56

## 2024-10-23 RX ADMIN — CALCIUM ACETATE 1334 MG: 667 CAPSULE ORAL at 14:41

## 2024-10-23 RX ADMIN — HEPARIN SODIUM 5000 UNITS: 5000 INJECTION INTRAVENOUS; SUBCUTANEOUS at 09:46

## 2024-10-23 RX ADMIN — BUSPIRONE HYDROCHLORIDE 10 MG: 5 TABLET ORAL at 14:41

## 2024-10-23 RX ADMIN — OXYCODONE 10 MG: 5 TABLET ORAL at 09:46

## 2024-10-23 RX ADMIN — PANTOPRAZOLE SODIUM 40 MG: 40 TABLET, DELAYED RELEASE ORAL at 16:48

## 2024-10-23 RX ADMIN — CLOPIDOGREL BISULFATE 75 MG: 75 TABLET ORAL at 09:46

## 2024-10-23 RX ADMIN — VENLAFAXINE HYDROCHLORIDE 75 MG: 37.5 CAPSULE, EXTENDED RELEASE ORAL at 09:46

## 2024-10-23 RX ADMIN — ASPIRIN 81 MG 81 MG: 81 TABLET ORAL at 09:46

## 2024-10-23 RX ADMIN — CALCIUM ACETATE 1334 MG: 667 CAPSULE ORAL at 09:46

## 2024-10-23 RX ADMIN — SODIUM ZIRCONIUM CYCLOSILICATE 10 G: 10 POWDER, FOR SUSPENSION ORAL at 12:03

## 2024-10-23 ASSESSMENT — PAIN SCALES - WONG BAKER
WONGBAKER_NUMERICALRESPONSE: NO HURT

## 2024-10-23 ASSESSMENT — PAIN SCALES - GENERAL
PAINLEVEL_OUTOF10: 8
PAINLEVEL_OUTOF10: 6
PAINLEVEL_OUTOF10: 0
PAINLEVEL_OUTOF10: 8
PAINLEVEL_OUTOF10: 6

## 2024-10-23 ASSESSMENT — PAIN DESCRIPTION - DESCRIPTORS
DESCRIPTORS: ACHING
DESCRIPTORS: ACHING;DULL

## 2024-10-23 ASSESSMENT — PAIN DESCRIPTION - ORIENTATION
ORIENTATION: LOWER;RIGHT;LEFT
ORIENTATION: LOWER

## 2024-10-23 ASSESSMENT — PAIN DESCRIPTION - LOCATION
LOCATION: BACK
LOCATION: BACK;HIP

## 2024-10-23 NOTE — CARE COORDINATION
CM update note.  LOS #5. From CC/LTC -- with HD TTS. Bed hold - no barrier to return when ready. Followed by IM and Nephrology.     Following.    Elly Drew RN

## 2024-10-23 NOTE — PROGRESS NOTES
Pt discharged to [facility] via [facility] transport. Pt taken off tele and PIVs removed. Pt with all belongings at time of discharge. Pt's AVS printed and reviewed, questions answered. Prescription for narcotic, CELINE, and AVS with [facility] transport. Pt's purewick removed and RN assisted pt getting dressed. Pt's wheelchair taken with squad. Report called and given to Vonnie @[facility]. Unremarkable discharge.

## 2024-10-23 NOTE — PROGRESS NOTES
[facility] Progress Note           Subjective/   54 y.o. year old male who we are seeing in consultation for ESKD on HD.     - Patient seen and examined at bedside.  He says he feels tired today.  Had some episodes of coffing, and N/V yesterday but seems to have subsided today. No complaints of CP or SOB.   - On 4L NC supplemental O2 (wears O2 at baseline).   - K 5.3 this morning  - BP better today   - No family at bedside  - Notes and Pmhx reviewed  - s/p C on 10/21/24    Objective/   GEN:  Chronically ill, BP (!) 107/59   Pulse 90   Temp 97.9 °F (36.6 °C) (Oral)   Resp 16   Ht 1.753 m (5' 9\")   Wt 94.7 kg (208 lb 12.4 oz)   SpO2 97%   BMI 30.83 kg/m²     Gen: alert, awake, NAD  Skin: no rash, turgor wnl  Neck: no bruits or jvd noted  Cardiovascular: RRR, +S1/S2 without m/r/g  Respiratory: Diminished-B without w/r/r; respiratory effort normal  Abdomen:  +bs, soft, nt, nd  Ext: +2 lower extremity edema  Psychiatric: mood and affect appropriate; judgement and insight intact  Access: L SC TDC    Data/  Recent Labs     10/21/24  0720 10/22/24  0726 10/23/24  0520   WBC 9.4 9.4 7.6   HGB 11.1* 11.0* 10.8*   HCT 36.1* 36.1* 34.8*   MCV 84.9 85.4 85.0    238 219     Recent Labs     10/22/24  0511 10/22/24  0726 10/22/24  1713 10/23/24  0520   *  --  130* 133*   K 7.0* 6.2* see below 5.3*   CL 91*  --  93* 93*   CO2 19*  --  19* 24   GLUCOSE 137*  --  154* 117*   PHOS 5.2*  --   --  5.5*   MG 2.70*  --   --   --    BUN 98*  --  51* 66*   CREATININE 10.9*  --  6.7* 7.5*   LABGLOM 5*  --  9* 8*       Assessment/Plan:     ESKD on HD  - T/T/S HD Schedule at [facility]   - TW appears 93kg from notes, 94.9kg post HD yesterday   - Hypervolemic, +1 edema   - Na 133, K 5.3   - Access: L SC TDC  - HD yesterday, tolerated well; 4L UF  Plan:   - will give Lokelma 10mg   - HD tomorrow   - UF as tolerated with target weight of 93kg  - will keep T/T/S schedule while inpatient  - daily

## 2024-10-23 NOTE — TELEPHONE ENCOUNTER
----- Message from Joy Garcia PA-C sent at 10/23/2024  1:58 PM CDT -----  Please call the patient and let her know that Dr. Wilder with Pulmonology (treating her Sleep Apnea) said she needs to increase how often she is using her CPAP (she needs to wear it nightly), before he will re-evaluate her for another sleep study.  ----- Message -----  From: Radha Wilder MD  Sent: 10/18/2024  12:15 PM CDT  To: Joy Garcia PA-C    She is not adherent with her treatment and symptoms in that case will be persistent.  She will have to improve her adherence and then will re-evaluate for retesting.    Thank you for seeing this patient.     Radha Wilder M.D  ----- Message -----  From: Joy Garcia PA-C  Sent: 10/17/2024  11:51 AM CDT  To: Radha Wilder MD    Can patient please have a sleep study for her SINTIA with CPAP? She's reporting decreased SpO2 at night, waking with daily headaches, and says it's been a few years since last sleep study.   Last Office Visit  -  12/10/21  Next Office Visit  -  n/a    Last Filled  -  4/6/22  Last UDS -  n/a  Contract -  N/a

## 2024-10-23 NOTE — PLAN OF CARE
CHF Care Plan      Patient's EF (Ejection Fraction) is less than 40%    Heart Failure Medications:  Diuretics:: None    (One of the following REQUIRED for EF </= 40%/SYSTOLIC FAILURE but MAY be used in EF% >40%/DIASTOLIC FAILURE)        ACE:: None        ARB:: None         ARNI:: None    (Beta Blockers)  NON- Evidenced Based Beta Blocker (for EF% >40%/DIASTOLIC FAILURE): None    Evidenced Based Beta Blocker::(REQUIRED for EF% <40%/SYSTOLIC FAILURE) None  ...................................................................................................................................................    Failed to redirect to the Timeline version of the [email or web address] SmartLink.      Patient's weights and intake/output reviewed    Daily Weight log at bedside, patient/family participation in use of log: \"yes    Patient's current weight today shows a difference of 1 lbs less than last documented weight.      Intake/Output Summary (Last 24 hours) at 10/23/2024 1727  Last data filed at 10/23/2024 1357  Gross per 24 hour   Intake --   Output 0 ml   Net 0 ml       Education Booklet Provided: yes    Comorbidities Reviewed Yes    Patient has a past medical history of Ambulatory dysfunction, Aortic stenosis, Arthritis, Asthma, Bilateral hilar adenopathy syndrome, CAD (coronary artery disease), Cardiomyopathy (HCC), CHF, EF 35-40% (July 2024), Chronic pain, COPD (chronic obstructive pulmonary disease) ([facility]), CVA (cerebral vascular accident) ([facility]), Depression, Diabetes mellitus (HCC), Difficult intravenous access, Emphysema of lung ([facility]), ESRD (end stage renal disease) on dialysis ([facility]), Fear of needles, Gastric ulcer, GERD (gastroesophageal reflux disease), HD, T/R/Sa, Heart valve problem, History of spinal fracture, History of transcatheter aortic valve replacement (TAVR), Hx of blood clots, Hyperlipidemia, Hypertension, MI (myocardial infarction) (HCC), Neuromuscular disorder (HCC), Numbness and tingling in left arm, Pneumonia, PONV  (postoperative nausea and vomiting), Prolonged emergence from general anesthesia, Sleep apnea, Stroke (HCC), TIA (transient ischemic attack), and Unspecified diseases of blood and blood-forming organs.     >>For CHF and Comorbidity documentation on Education Time and Topics, please see Education Tab      Pt resting in bed at this time on  3 L O2. Pt denies shortness of breath. Pt without lower extremity edema.     Patient and/or Family's stated Goal of Care this Admission: reduce shortness of breath, increase activity tolerance, better understand heart failure and disease management, be more comfortable, and reduce lower extremity edema prior to discharge        :

## 2024-10-23 NOTE — DISCHARGE INSTR - COC
Continuity of Care Form    Patient Name: Igor Ann   :  1970  MRN:  [id number]    Admit date:  10/21/2024  Discharge date:  10/23/24    Code Status Order: Full Code   Advance Directives:   Advance Care Flowsheet Documentation             Admitting Physician:  Oc Sandoval MD  PCP: Vonnie Cleveland APRN - NP    Discharging Nurse: Seth Segovia RN  Discharging Hospital Unit/Room#: 0440/0440-01  Discharging Unit Phone Number: [phone]    Emergency Contact:   Extended Emergency Contact Information  Primary Emergency Contact: Crystal Ann  Address: [address]                      [place] [address] [facility]  Home Phone: [phone]  Mobile Phone: [phone]  Relation: Spouse  Secondary Emergency Contact: Sridevi [place], [place]  Home Phone: [phone]  Mobile Phone: [phone]  Relation: Brother/Sister  Preferred language: English   needed? No    Past Surgical History:  Past Surgical History:   Procedure Laterality Date    AORTIC VALVE REPLACEMENT N/A 10/15/2019    TRANSCATHETER AORTIC VALVE REPLACEMENT FEMORAL APPROACH performed by Dion Holland MD at [facility] CVOR    CARDIAC PROCEDURE N/A 7/10/2024    Left and right heart cath / coronary angiography performed by Lamberto Pate MD at [facility] CARDIAC CATH LAB    CATHETER REMOVAL Right 2019    PERITONEAL DIALYSIS CATHETER REMOVAL performed by Remi Kincaid MD at [facility] OR    COLONOSCOPY      COLONOSCOPY      WNL    CORONARY ANGIOPLASTY WITH STENT PLACEMENT  2015    CYST REMOVAL  2013    EXCISION CYSTS, NECK X2 AND ABDOMINAL benign    DIAGNOSTIC CARDIAC CATH LAB PROCEDURE      DIALYSIS FISTULA CREATION Left 10/30/2017    LEFT BRACHIAL CEPHALIC FISTULA    DIALYSIS FISTULA CREATION Left 2019    LIGATION  AV FISTULA performed by Brenden Rosario MD at [facility] OR    ENDOSCOPY, COLON, DIAGNOSTIC      ESOPHAGOGASTRODUODENOSCOPY  2024     Hospital Discharge:   Respiratory Treatments: Nasal Cannula  Oxygen Therapy:  is on oxygen at 3 L/min per nasal cannula.  Ventilator:    - No ventilator support    Rehab Therapies: Physical Therapy and Occupational Therapy  Weight Bearing Status/Restrictions: No weight bearing restrictions  Other Medical Equipment (for information only, NOT a DME order):  wheelchair  Other Treatments: N/A    Patient's personal belongings (please select all that are sent with patient):  None    RN SIGNATURE:  Electronically signed by Shola Segovia RN on 10/23/24 at 5:35 PM EDT    CASE MANAGEMENT/SOCIAL WORK SECTION    Inpatient Status Date: 10/21/24    Readmission Risk Assessment Score:  Readmission Risk              Risk of Unplanned Readmission:  63           Discharging to Facility/ Agency    [facility]  Services: Skilled Nursing  [address] [place] [address]  [phone]      / signature: Electronically signed by Elly Drew RN on 10/23/24 at 2:47 PM EDT    PHYSICIAN SECTION    Prognosis: Good    Condition at Discharge: Stable    Rehab Potential (if transferring to Rehab): Fair    Recommended Labs or Other Treatments After Discharge:   Follow-up with Cardiology, Nephrology and PCP  Resume HD    Physician Certification: I certify the above information and transfer of Igor Ann  is necessary for the continuing treatment of the diagnosis listed and that he requires Intermediate Nursing Care for greater 30 days.     Update Admission H&P: No change in H&P    PHYSICIAN SIGNATURE:  Electronically signed by Oc Sandoval MD on 10/23/24 at 2:16 PM EDT

## 2024-10-23 NOTE — CARE COORDINATION
CASE MANAGEMENT DISCHARGE SUMMARY      Discharge to: [facility]     IMM given: 10/22/24       Transportation:        Medical Transport explained to pt/family. Pt/family voice no agency preference.    Agency used:[facility]   time: 1800   Ambulance form completed: Yes    Confirmed discharge plan with:     Patient: yes  - he will notify family     Family:  yes/no    Name: Contact number:     Facility/Agency, name:  [facility] faxed  [facility] [phone]   Phone number for report to facility: [phone]     RN, name: Shola    Note: Discharging nurse to complete [facility], reconcile AVS, and place final copy with patient's discharge packet. RN to ensure that written prescriptions for  Level II medications are sent with patient to the facility as per protocol.    Elly Drew, RN

## 2024-10-23 NOTE — PROGRESS NOTES
[facility]  Nephrology follow-up note           Reason for Consult: ESRD  Requesting Physician:  Dr. Pate    Sub/interval history  Feels better than yesterday    HD with 4 L net UF goal, midodrine for hypotension, post wt 94.9 kg; K on hemolyzed sample that was repeated was at 6.2    ROS: No chest pain/shortness of breath/fever/nausea/vomiting  PSFH: No visitor    Scheduled Meds:   sodium chloride flush  5-40 mL IntraVENous 2 times per day    clopidogrel  75 mg Oral Daily    aspirin  81 mg Oral Daily    atorvastatin  40 mg Oral Nightly    busPIRone  10 mg Oral TID    calcium acetate  2 capsule Oral TID    DULoxetine  60 mg Oral Daily    insulin glargine  15 Units SubCUTAneous Nightly    pantoprazole  40 mg Oral BID AC    pregabalin  25 mg Oral Daily    QUEtiapine  50 mg Oral Nightly    traZODone  50 mg Oral Daily    venlafaxine  75 mg Oral Daily    sodium chloride flush  5-40 mL IntraVENous 2 times per day    insulin lispro  0-8 Units SubCUTAneous 4x Daily AC & HS    heparin (porcine)  5,000 Units SubCUTAneous TID     Continuous Infusions:   sodium chloride      dextrose      sodium chloride       PRN Meds:.calcium carbonate, sodium chloride flush, sodium chloride, acetaminophen, ipratropium 0.5 mg-albuterol 2.5 mg, oxyCODONE HCl, glucose, dextrose bolus **OR** dextrose bolus, glucagon (rDNA), dextrose, sodium chloride flush, sodium chloride, ondansetron **OR** ondansetron, polyethylene glycol    History of Present Illness on 10/21/2024:    54 y.o. yo male with PMH of  ESRD, CAD status post PCI, CHF, COPD, HTN, DM, CVA and HTN, PAD status post iliac stents, chronic respiratory failure on 3 to 4 L of oxygen at baseline who is admitted after planned left heart catheterization due to abnormal stress test  He is on baseline oxygen need; labs are stable    Physical exam:   Constitutional:  VITALS:  BP (!) 107/59   Pulse 90   Temp 97.9 °F (36.6 °C) (Oral)   Resp 16   Ht 1.753 m (5' 9\")   Wt 94.7 kg (208 lb 12.4

## 2024-10-25 NOTE — TELEPHONE ENCOUNTER
10/25/24 1100   Discharge disposition   Expected discharge disposition Home or Self   Discharge transportation Private car     Vibha Chacon MBA BSN RN CRRN   RN Case Manager  [phone]    Last Office Visit  -  1/21/22  Next Office Visit  -  n/a    Last Filled  -  3/7/22  Last UDS -  n/a  Contract -  N/a

## 2024-11-11 LAB
A/G RATIO: 1.3 RATIO (ref 0.8–2.6)
ALBUMIN: 3.6 G/DL (ref 3.5–5.2)
ALP BLD-CCNC: 214 U/L (ref 23–144)
ALT SERPL-CCNC: 11 U/L (ref 0–60)
AST SERPL-CCNC: 9 U/L (ref 0–55)
B-TYPE NATRIURETIC PEPTIDE: ABNORMAL PG/ML (ref 0–125)
BASOPHILS ABSOLUTE: 0.1 K/UL (ref 0–0.3)
BASOPHILS RELATIVE PERCENT: 0.6 % (ref 0–2)
BILIRUB SERPL-MCNC: 0.3 MG/DL (ref 0–1.2)
BUN / CREAT RATIO: 10 (ref 7–25)
BUN BLDV-MCNC: 79 MG/DL (ref 3–29)
CALCIUM SERPL-MCNC: 8.8 MG/DL (ref 8.5–10.5)
CHLORIDE BLD-SCNC: 90 MEQ/L (ref 96–110)
CHOLESTEROL, TOTAL: 121 MG/DL
CO2: 26 MEQ/L (ref 19–32)
CREAT SERPL-MCNC: 7.7 MG/DL (ref 0.5–1.2)
DIFFERENTIAL COUNT: ABNORMAL
EOSINOPHILS ABSOLUTE: 0.4 K/UL (ref 0–0.5)
EOSINOPHILS RELATIVE PERCENT: 5.1 % (ref 0–5)
ESTIMATED GLOMERULAR FILTRATION RATE CREATININE EQUATION: 8 MLS/MIN/1.73M2
FASTING STATUS: ABNORMAL
GLOBULIN: 2.8 G/DL (ref 1.9–3.6)
GLUCOSE BLD-MCNC: 122 MG/DL (ref 70–99)
HCT VFR BLD CALC: 35.1 % (ref 37.5–51)
HDLC SERPL-MCNC: 49 MG/DL
HEMOGLOBIN: 10.8 G/DL (ref 13–17.7)
IMMATURE GRANS (ABS): 0.1 K/UL (ref 0–0.1)
IMMATURE GRANULOCYTES %: 1.2 %
LDL CHOLESTEROL: 52 MG/DL
LYMPHOCYTES ABSOLUTE: 1.2 K/UL (ref 0.9–4.1)
LYMPHOCYTES RELATIVE PERCENT: 13.9 % (ref 14–51)
MCH RBC QN AUTO: 26 PG (ref 26–34)
MCHC RBC AUTO-ENTMCNC: 30.8 G/DL (ref 30.7–35.5)
MCV RBC AUTO: 84.6 FL (ref 80–100)
MONOCYTES ABSOLUTE: 0.9 K/UL (ref 0.2–1)
MONOCYTES RELATIVE PERCENT: 9.8 % (ref 4–12)
NEUTROPHILS ABSOLUTE: 6 K/UL (ref 1.8–7.5)
NEUTROPHILS RELATIVE PERCENT: 69.4 % (ref 42–80)
PDW BLD-RTO: 16.7 %
PLATELET # BLD: 206 K/UL (ref 140–400)
PMV BLD AUTO: 10.5 FL (ref 7.2–11.7)
POTASSIUM SERPL-SCNC: 5 MEQ/L (ref 3.4–5.3)
RBC # BLD: 4.15 M/UL (ref 4.14–5.8)
RETICULOCYTE ABSOLUTE COUNT: 0 /100 WBC
SODIUM BLD-SCNC: 132 MEQ/L (ref 135–148)
TOTAL PROTEIN: 6.4 G/DL (ref 6–8.3)
TRIGL SERPL-MCNC: 100 MG/DL
VLDLC SERPL CALC-MCNC: 20 MG/DL (ref 4–38)
WBC # BLD: 8.7 K/UL (ref 3.5–10.9)

## 2024-12-27 ENCOUNTER — HOSPITAL ENCOUNTER (INPATIENT)
Age: 54
LOS: 2 days | Discharge: NURSING, REHAB OR LONG-TERM CARE | DRG: 291 | End: 2024-12-29
Attending: EMERGENCY MEDICINE | Admitting: INTERNAL MEDICINE
Payer: MEDICARE

## 2024-12-27 ENCOUNTER — APPOINTMENT (OUTPATIENT)
Dept: GENERAL RADIOLOGY | Age: 54
DRG: 291 | End: 2024-12-27
Payer: MEDICARE

## 2024-12-27 DIAGNOSIS — J96.02 ACUTE RESPIRATORY FAILURE WITH HYPERCAPNIA: ICD-10-CM

## 2024-12-27 DIAGNOSIS — G89.29 OTHER CHRONIC PAIN: ICD-10-CM

## 2024-12-27 DIAGNOSIS — Z91.148 NONCOMPLIANCE WITH MEDICATION REGIMEN: ICD-10-CM

## 2024-12-27 DIAGNOSIS — N18.6 STAGE 5 CHRONIC KIDNEY DISEASE ON CHRONIC DIALYSIS (HCC): ICD-10-CM

## 2024-12-27 DIAGNOSIS — J90 PLEURAL EFFUSION: ICD-10-CM

## 2024-12-27 DIAGNOSIS — I50.23 ACUTE ON CHRONIC SYSTOLIC CONGESTIVE HEART FAILURE (HCC): Primary | ICD-10-CM

## 2024-12-27 DIAGNOSIS — Z99.2 STAGE 5 CHRONIC KIDNEY DISEASE ON CHRONIC DIALYSIS (HCC): ICD-10-CM

## 2024-12-27 DIAGNOSIS — I10 HYPERTENSION, UNSPECIFIED TYPE: ICD-10-CM

## 2024-12-27 PROBLEM — I50.21 HEART FAILURE, ACUTE SYSTOLIC (HCC): Status: ACTIVE | Noted: 2024-12-27

## 2024-12-27 LAB
ALBUMIN SERPL-MCNC: 3.7 G/DL (ref 3.4–5)
ALBUMIN/GLOB SERPL: 0.9 {RATIO} (ref 1.1–2.2)
ALP SERPL-CCNC: 173 U/L (ref 40–129)
ALT SERPL-CCNC: 10 U/L (ref 10–40)
ANION GAP SERPL CALCULATED.3IONS-SCNC: 19 MMOL/L (ref 3–16)
AST SERPL-CCNC: 13 U/L (ref 15–37)
BASE EXCESS BLDV CALC-SCNC: -1.5 MMOL/L (ref -3–3)
BASE EXCESS BLDV CALC-SCNC: -3.9 MMOL/L (ref -3–3)
BASOPHILS # BLD: 0 K/UL (ref 0–0.2)
BASOPHILS NFR BLD: 0.5 %
BILIRUB SERPL-MCNC: 0.3 MG/DL (ref 0–1)
BUN SERPL-MCNC: 89 MG/DL (ref 7–20)
CALCIUM SERPL-MCNC: 8.6 MG/DL (ref 8.3–10.6)
CHLORIDE SERPL-SCNC: 91 MMOL/L (ref 99–110)
CO2 BLDV-SCNC: 22 MMOL/L
CO2 BLDV-SCNC: 28 MMOL/L
CO2 SERPL-SCNC: 26 MMOL/L (ref 21–32)
COHGB MFR BLDV: 2 % (ref 0–1.5)
COHGB MFR BLDV: 3.9 % (ref 0–1.5)
CREAT SERPL-MCNC: 8.1 MG/DL (ref 0.9–1.3)
DEPRECATED RDW RBC AUTO: 18.7 % (ref 12.4–15.4)
EKG ATRIAL RATE: 94 BPM
EKG DIAGNOSIS: NORMAL
EKG P AXIS: 94 DEGREES
EKG P-R INTERVAL: 174 MS
EKG Q-T INTERVAL: 360 MS
EKG QRS DURATION: 94 MS
EKG QTC CALCULATION (BAZETT): 450 MS
EKG R AXIS: -16 DEGREES
EKG T AXIS: 87 DEGREES
EKG VENTRICULAR RATE: 94 BPM
EOSINOPHIL # BLD: 0.1 K/UL (ref 0–0.6)
EOSINOPHIL NFR BLD: 0.7 %
GFR SERPLBLD CREATININE-BSD FMLA CKD-EPI: 7 ML/MIN/{1.73_M2}
GLUCOSE BLD-MCNC: 231 MG/DL (ref 70–99)
GLUCOSE SERPL-MCNC: 138 MG/DL (ref 70–99)
HCO3 BLDV-SCNC: 21.2 MMOL/L (ref 23–29)
HCO3 BLDV-SCNC: 26.2 MMOL/L (ref 23–29)
HCT VFR BLD AUTO: 32.6 % (ref 40.5–52.5)
HGB BLD-MCNC: 10.4 G/DL (ref 13.5–17.5)
LYMPHOCYTES # BLD: 0.6 K/UL (ref 1–5.1)
LYMPHOCYTES NFR BLD: 5.8 %
MCH RBC QN AUTO: 27.1 PG (ref 26–34)
MCHC RBC AUTO-ENTMCNC: 31.9 G/DL (ref 31–36)
MCV RBC AUTO: 84.8 FL (ref 80–100)
METHGB MFR BLDV: 0.3 %
METHGB MFR BLDV: 0.3 %
MONOCYTES # BLD: 0.6 K/UL (ref 0–1.3)
MONOCYTES NFR BLD: 5.5 %
NEUTROPHILS # BLD: 9.2 K/UL (ref 1.7–7.7)
NEUTROPHILS NFR BLD: 87.5 %
NT-PROBNP SERPL-MCNC: ABNORMAL PG/ML (ref 0–124)
O2 CT VFR BLDV CALC: 14 VOL %
O2 CT VFR BLDV CALC: 5 VOL %
O2 THERAPY: ABNORMAL
O2 THERAPY: ABNORMAL
PCO2 BLDV: 39 MMHG (ref 40–50)
PCO2 BLDV: 59.1 MMHG (ref 40–50)
PERFORMED ON: ABNORMAL
PH BLDV: 7.26 [PH] (ref 7.35–7.45)
PH BLDV: 7.35 [PH] (ref 7.35–7.45)
PLATELET # BLD AUTO: 272 K/UL (ref 135–450)
PMV BLD AUTO: 8.4 FL (ref 5–10.5)
PO2 BLDV: 25.4 MMHG (ref 25–40)
PO2 BLDV: 92.6 MMHG (ref 25–40)
POTASSIUM SERPL-SCNC: 5.1 MMOL/L (ref 3.5–5.1)
PROT SERPL-MCNC: 7.7 G/DL (ref 6.4–8.2)
RBC # BLD AUTO: 3.84 M/UL (ref 4.2–5.9)
SAO2 % BLDV: 37 %
SAO2 % BLDV: 97 %
SODIUM SERPL-SCNC: 136 MMOL/L (ref 136–145)
TROPONIN, HIGH SENSITIVITY: 125 NG/L (ref 0–22)
TROPONIN, HIGH SENSITIVITY: 126 NG/L (ref 0–22)
WBC # BLD AUTO: 10.6 K/UL (ref 4–11)

## 2024-12-27 PROCEDURE — 6370000000 HC RX 637 (ALT 250 FOR IP): Performed by: INTERNAL MEDICINE

## 2024-12-27 PROCEDURE — 6360000002 HC RX W HCPCS: Performed by: PHYSICIAN ASSISTANT

## 2024-12-27 PROCEDURE — 2700000000 HC OXYGEN THERAPY PER DAY

## 2024-12-27 PROCEDURE — 93010 ELECTROCARDIOGRAM REPORT: CPT | Performed by: INTERNAL MEDICINE

## 2024-12-27 PROCEDURE — 71045 X-RAY EXAM CHEST 1 VIEW: CPT

## 2024-12-27 PROCEDURE — 2000000000 HC ICU R&B

## 2024-12-27 PROCEDURE — 85025 COMPLETE CBC W/AUTO DIFF WBC: CPT

## 2024-12-27 PROCEDURE — 83880 ASSAY OF NATRIURETIC PEPTIDE: CPT

## 2024-12-27 PROCEDURE — 80053 COMPREHEN METABOLIC PANEL: CPT

## 2024-12-27 PROCEDURE — 93005 ELECTROCARDIOGRAM TRACING: CPT | Performed by: PHYSICIAN ASSISTANT

## 2024-12-27 PROCEDURE — 84484 ASSAY OF TROPONIN QUANT: CPT

## 2024-12-27 PROCEDURE — 5A09357 ASSISTANCE WITH RESPIRATORY VENTILATION, LESS THAN 24 CONSECUTIVE HOURS, CONTINUOUS POSITIVE AIRWAY PRESSURE: ICD-10-PCS | Performed by: INTERNAL MEDICINE

## 2024-12-27 PROCEDURE — 6370000000 HC RX 637 (ALT 250 FOR IP): Performed by: PHYSICIAN ASSISTANT

## 2024-12-27 PROCEDURE — 2500000003 HC RX 250 WO HCPCS: Performed by: INTERNAL MEDICINE

## 2024-12-27 PROCEDURE — 94660 CPAP INITIATION&MGMT: CPT

## 2024-12-27 PROCEDURE — 94761 N-INVAS EAR/PLS OXIMETRY MLT: CPT

## 2024-12-27 PROCEDURE — 82803 BLOOD GASES ANY COMBINATION: CPT

## 2024-12-27 PROCEDURE — 99285 EMERGENCY DEPT VISIT HI MDM: CPT

## 2024-12-27 RX ORDER — FUROSEMIDE 10 MG/ML
40 INJECTION INTRAMUSCULAR; INTRAVENOUS 2 TIMES DAILY
Status: DISCONTINUED | OUTPATIENT
Start: 2024-12-28 | End: 2024-12-29 | Stop reason: HOSPADM

## 2024-12-27 RX ORDER — ASPIRIN 81 MG/1
81 TABLET, CHEWABLE ORAL DAILY
Status: DISCONTINUED | OUTPATIENT
Start: 2024-12-28 | End: 2024-12-28

## 2024-12-27 RX ORDER — BUSPIRONE HYDROCHLORIDE 10 MG/1
10 TABLET ORAL 3 TIMES DAILY
Status: DISCONTINUED | OUTPATIENT
Start: 2024-12-27 | End: 2024-12-29 | Stop reason: HOSPADM

## 2024-12-27 RX ORDER — IPRATROPIUM BROMIDE AND ALBUTEROL SULFATE 2.5; .5 MG/3ML; MG/3ML
1 SOLUTION RESPIRATORY (INHALATION) EVERY 4 HOURS PRN
Status: DISCONTINUED | OUTPATIENT
Start: 2024-12-27 | End: 2024-12-29 | Stop reason: HOSPADM

## 2024-12-27 RX ORDER — ACETAMINOPHEN 325 MG/1
650 TABLET ORAL EVERY 6 HOURS PRN
Status: DISCONTINUED | OUTPATIENT
Start: 2024-12-27 | End: 2024-12-29 | Stop reason: HOSPADM

## 2024-12-27 RX ORDER — HEPARIN SODIUM 5000 [USP'U]/ML
5000 INJECTION, SOLUTION INTRAVENOUS; SUBCUTANEOUS EVERY 8 HOURS SCHEDULED
Status: DISCONTINUED | OUTPATIENT
Start: 2024-12-28 | End: 2024-12-28

## 2024-12-27 RX ORDER — ISOSORBIDE MONONITRATE 30 MG/1
30 TABLET, EXTENDED RELEASE ORAL DAILY
Status: DISCONTINUED | OUTPATIENT
Start: 2024-12-28 | End: 2024-12-29 | Stop reason: HOSPADM

## 2024-12-27 RX ORDER — MIDODRINE HYDROCHLORIDE 10 MG/1
10 TABLET ORAL
COMMUNITY

## 2024-12-27 RX ORDER — POLYETHYLENE GLYCOL 3350 17 G/17G
17 POWDER, FOR SOLUTION ORAL DAILY PRN
Status: DISCONTINUED | OUTPATIENT
Start: 2024-12-27 | End: 2024-12-29 | Stop reason: HOSPADM

## 2024-12-27 RX ORDER — METOPROLOL SUCCINATE 25 MG/1
25 TABLET, EXTENDED RELEASE ORAL DAILY
Status: DISCONTINUED | OUTPATIENT
Start: 2024-12-28 | End: 2024-12-29 | Stop reason: HOSPADM

## 2024-12-27 RX ORDER — SODIUM CHLORIDE 0.9 % (FLUSH) 0.9 %
5-40 SYRINGE (ML) INJECTION EVERY 12 HOURS SCHEDULED
Status: DISCONTINUED | OUTPATIENT
Start: 2024-12-27 | End: 2024-12-29 | Stop reason: HOSPADM

## 2024-12-27 RX ORDER — CLOPIDOGREL BISULFATE 75 MG/1
75 TABLET ORAL DAILY
Status: DISCONTINUED | OUTPATIENT
Start: 2024-12-28 | End: 2024-12-29 | Stop reason: HOSPADM

## 2024-12-27 RX ORDER — ATORVASTATIN CALCIUM 40 MG/1
40 TABLET, FILM COATED ORAL DAILY
Status: DISCONTINUED | OUTPATIENT
Start: 2024-12-28 | End: 2024-12-29 | Stop reason: HOSPADM

## 2024-12-27 RX ORDER — ONDANSETRON 4 MG/1
4 TABLET, ORALLY DISINTEGRATING ORAL EVERY 8 HOURS PRN
Status: DISCONTINUED | OUTPATIENT
Start: 2024-12-27 | End: 2024-12-29 | Stop reason: HOSPADM

## 2024-12-27 RX ORDER — SODIUM CHLORIDE 0.9 % (FLUSH) 0.9 %
5-40 SYRINGE (ML) INJECTION PRN
Status: DISCONTINUED | OUTPATIENT
Start: 2024-12-27 | End: 2024-12-29 | Stop reason: HOSPADM

## 2024-12-27 RX ORDER — SODIUM CHLORIDE 9 MG/ML
INJECTION, SOLUTION INTRAVENOUS PRN
Status: DISCONTINUED | OUTPATIENT
Start: 2024-12-27 | End: 2024-12-29 | Stop reason: HOSPADM

## 2024-12-27 RX ORDER — OXYCODONE HYDROCHLORIDE 5 MG/1
10 TABLET ORAL ONCE
Status: COMPLETED | OUTPATIENT
Start: 2024-12-27 | End: 2024-12-27

## 2024-12-27 RX ORDER — VENLAFAXINE HYDROCHLORIDE 37.5 MG/1
37.5 CAPSULE, EXTENDED RELEASE ORAL DAILY
Status: ON HOLD | COMMUNITY
End: 2024-12-29 | Stop reason: HOSPADM

## 2024-12-27 RX ORDER — ACETAMINOPHEN 650 MG/1
650 SUPPOSITORY RECTAL EVERY 6 HOURS PRN
Status: DISCONTINUED | OUTPATIENT
Start: 2024-12-27 | End: 2024-12-29 | Stop reason: HOSPADM

## 2024-12-27 RX ORDER — PANTOPRAZOLE SODIUM 40 MG/1
40 TABLET, DELAYED RELEASE ORAL
Status: DISCONTINUED | OUTPATIENT
Start: 2024-12-28 | End: 2024-12-29 | Stop reason: HOSPADM

## 2024-12-27 RX ORDER — QUETIAPINE FUMARATE 25 MG/1
50 TABLET, FILM COATED ORAL NIGHTLY
Status: DISCONTINUED | OUTPATIENT
Start: 2024-12-27 | End: 2024-12-28

## 2024-12-27 RX ORDER — MAGNESIUM SULFATE IN WATER 40 MG/ML
2000 INJECTION, SOLUTION INTRAVENOUS PRN
Status: DISCONTINUED | OUTPATIENT
Start: 2024-12-27 | End: 2024-12-29 | Stop reason: HOSPADM

## 2024-12-27 RX ORDER — FUROSEMIDE 10 MG/ML
60 INJECTION INTRAMUSCULAR; INTRAVENOUS ONCE
Status: COMPLETED | OUTPATIENT
Start: 2024-12-27 | End: 2024-12-27

## 2024-12-27 RX ORDER — IPRATROPIUM BROMIDE AND ALBUTEROL SULFATE 2.5; .5 MG/3ML; MG/3ML
1 SOLUTION RESPIRATORY (INHALATION)
Status: DISCONTINUED | OUTPATIENT
Start: 2024-12-28 | End: 2024-12-29

## 2024-12-27 RX ORDER — ONDANSETRON 2 MG/ML
4 INJECTION INTRAMUSCULAR; INTRAVENOUS EVERY 6 HOURS PRN
Status: DISCONTINUED | OUTPATIENT
Start: 2024-12-27 | End: 2024-12-29 | Stop reason: HOSPADM

## 2024-12-27 RX ADMIN — BUSPIRONE HYDROCHLORIDE 10 MG: 10 TABLET ORAL at 21:53

## 2024-12-27 RX ADMIN — FUROSEMIDE 60 MG: 10 INJECTION, SOLUTION INTRAMUSCULAR; INTRAVENOUS at 17:38

## 2024-12-27 RX ADMIN — SODIUM CHLORIDE, PRESERVATIVE FREE 10 ML: 5 INJECTION INTRAVENOUS at 21:52

## 2024-12-27 RX ADMIN — OXYCODONE 10 MG: 5 TABLET ORAL at 16:37

## 2024-12-27 ASSESSMENT — PAIN - FUNCTIONAL ASSESSMENT
PAIN_FUNCTIONAL_ASSESSMENT: PREVENTS OR INTERFERES SOME ACTIVE ACTIVITIES AND ADLS
PAIN_FUNCTIONAL_ASSESSMENT: 0-10

## 2024-12-27 ASSESSMENT — LIFESTYLE VARIABLES
HOW MANY STANDARD DRINKS CONTAINING ALCOHOL DO YOU HAVE ON A TYPICAL DAY: 1 OR 2
HOW OFTEN DO YOU HAVE A DRINK CONTAINING ALCOHOL: MONTHLY OR LESS
HOW OFTEN DO YOU HAVE A DRINK CONTAINING ALCOHOL: NEVER

## 2024-12-27 ASSESSMENT — PAIN DESCRIPTION - ONSET: ONSET: ON-GOING

## 2024-12-27 ASSESSMENT — PAIN DESCRIPTION - FREQUENCY: FREQUENCY: CONTINUOUS

## 2024-12-27 ASSESSMENT — PAIN DESCRIPTION - PAIN TYPE: TYPE: CHRONIC PAIN

## 2024-12-27 ASSESSMENT — PAIN DESCRIPTION - DIRECTION: RADIATING_TOWARDS: LEGS

## 2024-12-27 ASSESSMENT — PAIN SCALES - GENERAL
PAINLEVEL_OUTOF10: 8
PAINLEVEL_OUTOF10: 8

## 2024-12-27 ASSESSMENT — PAIN DESCRIPTION - ORIENTATION: ORIENTATION: LOWER

## 2024-12-27 ASSESSMENT — PAIN DESCRIPTION - LOCATION: LOCATION: BACK

## 2024-12-27 ASSESSMENT — PAIN DESCRIPTION - DESCRIPTORS: DESCRIPTORS: BURNING;ACHING

## 2024-12-27 NOTE — ED PROVIDER NOTES
MD  12/27/24 1523    
(05/21/2017), Depression, Diabetes mellitus ([facility]), Difficult intravenous access, Emphysema of lung (HCC), ESRD (end stage renal disease) on dialysis ([facility]), Fear of needles, Gastric ulcer, GERD (gastroesophageal reflux disease), HD, T/R/Sa, Heart valve problem, History of spinal fracture, History of transcatheter aortic valve replacement (TAVR), blood clots, Hyperlipidemia, Hypertension, MI (myocardial infarction) ([facility]) (2019), Neuromuscular disorder ([facility]), Numbness and tingling in left arm, Pneumonia, PONV (postoperative nausea and vomiting), Prolonged emergence from general anesthesia, Sleep apnea, Stroke ([facility]), TIA (transient ischemic attack), and Unspecified diseases of blood and blood-forming organs.    CONSULTS: (Who and What was discussed)  None      Records Reviewed (External and Source) admitted 10/21/2024 for foot infection coronary artery disease and abnormal cardiac catheterization, echo July 2024 reveals ejection fraction 35%    CC/HPI Summary, DDx, ED Course, and Reassessment: This is a 54-year-old male history of aortic stenosis coronary artery disease cardiomyopathy congestive heart failure ejection fraction 35% chronic pain COPD stroke diabetes emphysema GERD hypertension hyperlipidemia TIA sleep apnea coming in with shortness of breath for the past 2 to 3 days.  He missed dialysis on Thursday.  He went to dialysis today and could no longer complete his dialysis due to worsening shortness of breath and hypertension.  He arrives in mild respiratory distress, he is satting well on his 4 L but significantly tachypneic.  His blood gas reveals a pH of 7.2 pCO2 59.  His BNP is over 70,000, his troponin is elevated at 126 and down trended to 125 without acute ischemic changes.  I do feel this is most likely due to his renal failure and congestive heart failure.  He has chronic kidney disease with renal failure with creatinine 8.1 BUN 89 GFR 7.  Potassium 5.1.  Anemia at baseline of 10 hemoglobin.  No

## 2024-12-28 PROBLEM — Z99.2 STAGE 5 CHRONIC KIDNEY DISEASE ON CHRONIC DIALYSIS (HCC): Status: ACTIVE | Noted: 2023-07-05

## 2024-12-28 LAB
ANION GAP SERPL CALCULATED.3IONS-SCNC: 17 MMOL/L (ref 3–16)
BASOPHILS # BLD: 0 K/UL (ref 0–0.2)
BASOPHILS NFR BLD: 0.1 %
BUN SERPL-MCNC: 105 MG/DL (ref 7–20)
CALCIUM SERPL-MCNC: 8.5 MG/DL (ref 8.3–10.6)
CHLORIDE SERPL-SCNC: 92 MMOL/L (ref 99–110)
CO2 SERPL-SCNC: 21 MMOL/L (ref 21–32)
CREAT SERPL-MCNC: 10.8 MG/DL (ref 0.9–1.3)
DEPRECATED RDW RBC AUTO: 18.8 % (ref 12.4–15.4)
EOSINOPHIL # BLD: 0 K/UL (ref 0–0.6)
EOSINOPHIL NFR BLD: 0.1 %
GFR SERPLBLD CREATININE-BSD FMLA CKD-EPI: 5 ML/MIN/{1.73_M2}
GLUCOSE BLD-MCNC: 128 MG/DL (ref 70–99)
GLUCOSE BLD-MCNC: 172 MG/DL (ref 70–99)
GLUCOSE BLD-MCNC: 215 MG/DL (ref 70–99)
GLUCOSE BLD-MCNC: 279 MG/DL (ref 70–99)
GLUCOSE BLD-MCNC: 287 MG/DL (ref 70–99)
GLUCOSE BLD-MCNC: 320 MG/DL (ref 70–99)
GLUCOSE BLD-MCNC: 358 MG/DL (ref 70–99)
GLUCOSE SERPL-MCNC: 190 MG/DL (ref 70–99)
HCT VFR BLD AUTO: 30.6 % (ref 40.5–52.5)
HGB BLD-MCNC: 9.8 G/DL (ref 13.5–17.5)
LYMPHOCYTES # BLD: 0.3 K/UL (ref 1–5.1)
LYMPHOCYTES NFR BLD: 4.7 %
MCH RBC QN AUTO: 27.3 PG (ref 26–34)
MCHC RBC AUTO-ENTMCNC: 32.1 G/DL (ref 31–36)
MCV RBC AUTO: 85.1 FL (ref 80–100)
MONOCYTES # BLD: 0.2 K/UL (ref 0–1.3)
MONOCYTES NFR BLD: 2.8 %
NEUTROPHILS # BLD: 5.5 K/UL (ref 1.7–7.7)
NEUTROPHILS NFR BLD: 92.3 %
PERFORMED ON: ABNORMAL
PLATELET # BLD AUTO: 228 K/UL (ref 135–450)
PMV BLD AUTO: 8.3 FL (ref 5–10.5)
POTASSIUM SERPL-SCNC: 5.9 MMOL/L (ref 3.5–5.1)
RBC # BLD AUTO: 3.59 M/UL (ref 4.2–5.9)
SODIUM SERPL-SCNC: 130 MMOL/L (ref 136–145)
WBC # BLD AUTO: 6 K/UL (ref 4–11)

## 2024-12-28 PROCEDURE — 85025 COMPLETE CBC W/AUTO DIFF WBC: CPT

## 2024-12-28 PROCEDURE — 2500000003 HC RX 250 WO HCPCS: Performed by: INTERNAL MEDICINE

## 2024-12-28 PROCEDURE — 90935 HEMODIALYSIS ONE EVALUATION: CPT

## 2024-12-28 PROCEDURE — 6370000000 HC RX 637 (ALT 250 FOR IP): Performed by: INTERNAL MEDICINE

## 2024-12-28 PROCEDURE — 36415 COLL VENOUS BLD VENIPUNCTURE: CPT

## 2024-12-28 PROCEDURE — 2700000000 HC OXYGEN THERAPY PER DAY

## 2024-12-28 PROCEDURE — 5A1D70Z PERFORMANCE OF URINARY FILTRATION, INTERMITTENT, LESS THAN 6 HOURS PER DAY: ICD-10-PCS | Performed by: INTERNAL MEDICINE

## 2024-12-28 PROCEDURE — 94761 N-INVAS EAR/PLS OXIMETRY MLT: CPT

## 2024-12-28 PROCEDURE — 99223 1ST HOSP IP/OBS HIGH 75: CPT | Performed by: INTERNAL MEDICINE

## 2024-12-28 PROCEDURE — 6360000002 HC RX W HCPCS: Performed by: INTERNAL MEDICINE

## 2024-12-28 PROCEDURE — 94660 CPAP INITIATION&MGMT: CPT

## 2024-12-28 PROCEDURE — 80048 BASIC METABOLIC PNL TOTAL CA: CPT

## 2024-12-28 PROCEDURE — 94640 AIRWAY INHALATION TREATMENT: CPT

## 2024-12-28 PROCEDURE — 2000000000 HC ICU R&B

## 2024-12-28 PROCEDURE — 99291 CRITICAL CARE FIRST HOUR: CPT | Performed by: INTERNAL MEDICINE

## 2024-12-28 RX ORDER — HEPARIN SODIUM 1000 [USP'U]/ML
3600 INJECTION, SOLUTION INTRAVENOUS; SUBCUTANEOUS PRN
Status: DISCONTINUED | OUTPATIENT
Start: 2024-12-28 | End: 2024-12-29 | Stop reason: HOSPADM

## 2024-12-28 RX ORDER — HEPARIN 100 UNIT/ML
300 SYRINGE INTRAVENOUS PRN
Status: DISCONTINUED | OUTPATIENT
Start: 2024-12-28 | End: 2024-12-29 | Stop reason: HOSPADM

## 2024-12-28 RX ORDER — INSULIN LISPRO 100 [IU]/ML
0-4 INJECTION, SOLUTION INTRAVENOUS; SUBCUTANEOUS
Status: DISCONTINUED | OUTPATIENT
Start: 2024-12-28 | End: 2024-12-29 | Stop reason: HOSPADM

## 2024-12-28 RX ORDER — DEXTROSE MONOHYDRATE 100 MG/ML
INJECTION, SOLUTION INTRAVENOUS CONTINUOUS PRN
Status: DISCONTINUED | OUTPATIENT
Start: 2024-12-28 | End: 2024-12-29 | Stop reason: HOSPADM

## 2024-12-28 RX ORDER — HEPARIN SODIUM 1000 [USP'U]/ML
1000 INJECTION, SOLUTION INTRAVENOUS; SUBCUTANEOUS PRN
Status: DISCONTINUED | OUTPATIENT
Start: 2024-12-28 | End: 2024-12-29 | Stop reason: HOSPADM

## 2024-12-28 RX ORDER — INSULIN GLARGINE 100 [IU]/ML
15 INJECTION, SOLUTION SUBCUTANEOUS NIGHTLY
Status: DISCONTINUED | OUTPATIENT
Start: 2024-12-28 | End: 2024-12-29 | Stop reason: HOSPADM

## 2024-12-28 RX ORDER — OXYCODONE HYDROCHLORIDE 5 MG/1
10 TABLET ORAL EVERY 6 HOURS PRN
Status: DISCONTINUED | OUTPATIENT
Start: 2024-12-28 | End: 2024-12-29 | Stop reason: HOSPADM

## 2024-12-28 RX ORDER — GLUCAGON 1 MG/ML
1 KIT INJECTION PRN
Status: DISCONTINUED | OUTPATIENT
Start: 2024-12-28 | End: 2024-12-29 | Stop reason: HOSPADM

## 2024-12-28 RX ORDER — HEPARIN SODIUM 1000 [USP'U]/ML
600 INJECTION, SOLUTION INTRAVENOUS; SUBCUTANEOUS PRN
Status: DISCONTINUED | OUTPATIENT
Start: 2024-12-28 | End: 2024-12-29 | Stop reason: HOSPADM

## 2024-12-28 RX ADMIN — BUSPIRONE HYDROCHLORIDE 10 MG: 10 TABLET ORAL at 13:35

## 2024-12-28 RX ADMIN — IPRATROPIUM BROMIDE AND ALBUTEROL SULFATE 1 DOSE: 2.5; .5 SOLUTION RESPIRATORY (INHALATION) at 11:27

## 2024-12-28 RX ADMIN — SODIUM CHLORIDE, PRESERVATIVE FREE 10 ML: 5 INJECTION INTRAVENOUS at 13:35

## 2024-12-28 RX ADMIN — APIXABAN 2.5 MG: 5 TABLET, FILM COATED ORAL at 13:35

## 2024-12-28 RX ADMIN — IPRATROPIUM BROMIDE AND ALBUTEROL SULFATE 1 DOSE: 2.5; .5 SOLUTION RESPIRATORY (INHALATION) at 20:12

## 2024-12-28 RX ADMIN — INSULIN GLARGINE 15 UNITS: 100 INJECTION, SOLUTION SUBCUTANEOUS at 01:19

## 2024-12-28 RX ADMIN — SACUBITRIL AND VALSARTAN 1 TABLET: 24; 26 TABLET, FILM COATED ORAL at 20:48

## 2024-12-28 RX ADMIN — INSULIN LISPRO 2 UNITS: 100 INJECTION, SOLUTION INTRAVENOUS; SUBCUTANEOUS at 01:19

## 2024-12-28 RX ADMIN — INSULIN LISPRO 2 UNITS: 100 INJECTION, SOLUTION INTRAVENOUS; SUBCUTANEOUS at 20:48

## 2024-12-28 RX ADMIN — IPRATROPIUM BROMIDE AND ALBUTEROL SULFATE 1 DOSE: 2.5; .5 SOLUTION RESPIRATORY (INHALATION) at 08:04

## 2024-12-28 RX ADMIN — HEPARIN SODIUM 5000 UNITS: 5000 INJECTION INTRAVENOUS; SUBCUTANEOUS at 06:30

## 2024-12-28 RX ADMIN — PANTOPRAZOLE SODIUM 40 MG: 40 TABLET, DELAYED RELEASE ORAL at 06:30

## 2024-12-28 RX ADMIN — EPOETIN ALFA-EPBX 5000 UNITS: 10000 INJECTION, SOLUTION INTRAVENOUS; SUBCUTANEOUS at 10:28

## 2024-12-28 RX ADMIN — OXYCODONE 10 MG: 5 TABLET ORAL at 21:10

## 2024-12-28 RX ADMIN — INSULIN GLARGINE 15 UNITS: 100 INJECTION, SOLUTION SUBCUTANEOUS at 20:48

## 2024-12-28 RX ADMIN — APIXABAN 2.5 MG: 5 TABLET, FILM COATED ORAL at 20:48

## 2024-12-28 RX ADMIN — ATORVASTATIN CALCIUM 40 MG: 40 TABLET, FILM COATED ORAL at 13:35

## 2024-12-28 RX ADMIN — CLOPIDOGREL BISULFATE 75 MG: 75 TABLET ORAL at 13:34

## 2024-12-28 RX ADMIN — SODIUM CHLORIDE, PRESERVATIVE FREE 10 ML: 5 INJECTION INTRAVENOUS at 20:49

## 2024-12-28 RX ADMIN — ISOSORBIDE MONONITRATE 30 MG: 30 TABLET, EXTENDED RELEASE ORAL at 14:27

## 2024-12-28 RX ADMIN — ASPIRIN 81 MG: 81 TABLET, CHEWABLE ORAL at 13:35

## 2024-12-28 RX ADMIN — FUROSEMIDE 40 MG: 10 INJECTION, SOLUTION INTRAMUSCULAR; INTRAVENOUS at 16:53

## 2024-12-28 RX ADMIN — IPRATROPIUM BROMIDE AND ALBUTEROL SULFATE 1 DOSE: 2.5; .5 SOLUTION RESPIRATORY (INHALATION) at 16:16

## 2024-12-28 RX ADMIN — PANTOPRAZOLE SODIUM 40 MG: 40 TABLET, DELAYED RELEASE ORAL at 16:52

## 2024-12-28 RX ADMIN — METOPROLOL SUCCINATE 25 MG: 25 TABLET, EXTENDED RELEASE ORAL at 13:35

## 2024-12-28 RX ADMIN — BUSPIRONE HYDROCHLORIDE 10 MG: 10 TABLET ORAL at 20:48

## 2024-12-28 ASSESSMENT — PAIN SCALES - GENERAL
PAINLEVEL_OUTOF10: 8
PAINLEVEL_OUTOF10: 8
PAINLEVEL_OUTOF10: 7

## 2024-12-28 ASSESSMENT — PAIN - FUNCTIONAL ASSESSMENT: PAIN_FUNCTIONAL_ASSESSMENT: PREVENTS OR INTERFERES SOME ACTIVE ACTIVITIES AND ADLS

## 2024-12-28 ASSESSMENT — PAIN DESCRIPTION - LOCATION
LOCATION: BACK;FOOT
LOCATION: BACK

## 2024-12-28 ASSESSMENT — PAIN DESCRIPTION - PAIN TYPE: TYPE: CHRONIC PAIN

## 2024-12-28 ASSESSMENT — PAIN DESCRIPTION - DESCRIPTORS: DESCRIPTORS: ACHING

## 2024-12-28 ASSESSMENT — PAIN DESCRIPTION - ORIENTATION: ORIENTATION: RIGHT

## 2024-12-28 NOTE — CONSULTS
[facility]                                 Pulmonary/Critical care  Consult /Progress Note :                                                                  CC :SOB and HTN  Patient is being seen at the request of Dr FREED for a consultation for Resp failure       HISTORY OF PRESENT ILLNESS:   53 y/o male with history of ESRD on dialysis with a TDC admitted to the ICU with respiratory distress after missing dialysis and coming to the ER with shortness of breath.  Now in the ICU on bilevel.  He has been confused and he is somnolent.    His BP was high and started on antihypotensive  Right now on biPAP    CXR with pleura;l effusion and pulmonary edema   On Plavix     PAST MEDICAL HISTORY:  Past Medical History:   Diagnosis Date    Ambulatory dysfunction     walker for long distances, SOB with distance    Aortic stenosis     echo 2017    Arthritis     hands and hips    Asthma     Bilateral hilar adenopathy syndrome 06/03/2013    CAD (coronary artery disease)     Dr. Javier Chanel [facility]    Cardiomyopathy ([facility]) 04/19/2019    EF= 43%    CHF, EF 35-40% (July 2024)     Chronic pain     COPD (chronic obstructive pulmonary disease) ([facility])     pulmonology Dr. Batista    CVA (cerebral vascular accident) ([facility]) 05/21/2017    Depression     Diabetes mellitus ([facility])     borderline    Difficult intravenous access     Emphysema of lung ([facility])     ESRD (end stage renal disease) on dialysis ([facility])     MWF    Fear of needles     Gastric ulcer     GERD (gastroesophageal reflux disease)     HD, T/R/Sa     Heart valve problem     bicuspic valve    History of spinal fracture     work incident    History of transcatheter aortic valve replacement (TAVR)     reported by wife    Hx of blood clots     Bilateral lower extremities; stents in place    Hyperlipidemia     Hypertension     MI (myocardial infarction) ([facility]) 2019    has had 9 MIs. 2019 was the last    
      Thank you to requesting provider: Dr. Stephenson , for asking us to see Igor Ann  Reason for consultation:  ESRD  Chief Complaint:  Shortness of breath    History of Presenting Illness      53 y/o male with history of ESRD on dialysis with a TDC admitted to the ICU with respiratory distress after missing dialysis and coming to the ER with shortness of breath.  Now in the ICU on bilevel.  He has been confused and he is somnolent.        Past Medical/Surgical History      Active Ambulatory Problems     Diagnosis Date Noted    Sepsis without acute organ dysfunction ([facility]) 12/25/2012    CHF (congestive heart failure) ([facility]) 05/14/2013    Chronic obstructive pulmonary disease ([facility]) 05/14/2013    Coronary artery disease involving native coronary artery of native heart 05/14/2013    PVD (peripheral vascular disease) ([facility]) 05/14/2013    Bicuspid aortic valve 06/03/2013    Bilateral hilar adenopathy syndrome 06/03/2013    Claudication in peripheral vascular disease ([facility]) 06/26/2013    Hypertension 11/14/2013    Diabetic neuropathy ([facility]) 11/14/2013    Type 2 DM with hypertension and ESRD on dialysis ([facility]) 03/04/2014    Passive smoke exposure 05/30/2014    Depression with anxiety 06/05/2014    Community acquired pneumonia of left lower lobe of lung 03/28/2015    Obesity     ZAINAB on CPAP 02/11/2016    Degeneration of lumbar or lumbosacral intervertebral disc 03/18/2016    Lumbar radiculopathy 03/18/2016    Lumbosacral spondylosis without myelopathy 03/18/2016    Biliary colic 01/04/2017    Symptomatic cholelithiasis 01/04/2017    Gastroparesis due to DM ([facility])     Angina, class IV ([facility])     Dyspnea     Dyslipidemia     Chronic systolic CHF (congestive heart failure) ([facility])     Ischemic cardiomyopathy     Tobacco abuse 05/21/2017    CVA (cerebral vascular accident) ([facility]) 05/21/2017    History of CVA (cerebrovascular accident)     Type 2 diabetes mellitus without complication, without long-term current use of insulin ([facility])  
atrium is moderately dilated.    Pericardium: Small (<1 cm) localized pericardial effusion present around the lateral and left ventricle. No indication of cardiac tamponade.    Image quality is poor. Contrast used: Definity.        Assessment  Fluid overload due to missing dialysis  Ischemic cardiomyopathy resume guide line directed therapy on Toprol and isosorbide I will add entresto  CAD chest discomfort improving currently on Toprol asa plavix and apixaban, will stop asa   as triple therapy likely to cause bleed  S/P TAVR   5    ESRD poor compliance per patient for dialysis.  6. Hypertension high today will add entresto  7. ZAINAB on CPAP but not using it at the ECF says [facility] does not have orders for it  8 PAF on apixaban now in sinus      I had the opportunity to review the clinical symptoms and presentation of Igor Ann.       I will address the patient's cardiac risk factors and adjusted pharmacologic treatment as needed. In addition, I have reinforced the need for patient directed risk factor modification.    Thank you for allowing to us to participate in the care or Igor Ann. Further evaluation will be based upon the patient's clinical course and testing results.    All questions and concerns were addressed to the patient/family. Alternatives to my treatment were discussed. The note was completed using EMR. Every effort was made to ensure accuracy; however, inadvertent computerized transcription errors may be present.

## 2024-12-28 NOTE — PLAN OF CARE
Problem: Chronic Conditions and Co-morbidities  Goal: Patient's chronic conditions and co-morbidity symptoms are monitored and maintained or improved  Outcome: Progressing  Flowsheets (Taken 12/27/2024 2100)  Care Plan - Patient's Chronic Conditions and Co-Morbidity Symptoms are Monitored and Maintained or Improved: Monitor and assess patient's chronic conditions and comorbid symptoms for stability, deterioration, or improvement     Problem: Discharge Planning  Goal: Discharge to home or other facility with appropriate resources  Outcome: Progressing  Flowsheets  Taken 12/27/2024 2112  Discharge to home or other facility with appropriate resources:   Identify barriers to discharge with patient and caregiver   Identify discharge learning needs (meds, wound care, etc)  Taken 12/27/2024 2100  Discharge to home or other facility with appropriate resources: Identify barriers to discharge with patient and caregiver     Problem: Pain  Goal: Verbalizes/displays adequate comfort level or baseline comfort level  Outcome: Progressing     Problem: Safety - Adult  Goal: Free from fall injury  Outcome: Progressing     Problem: ABCDS Injury Assessment  Goal: Absence of physical injury  Outcome: Progressing     Problem: Skin/Tissue Integrity  Goal: Absence of new skin breakdown  Description: 1.  Monitor for areas of redness and/or skin breakdown  2.  Assess vascular access sites hourly  3.  Every 4-6 hours minimum:  Change oxygen saturation probe site  4.  Every 4-6 hours:  If on nasal continuous positive airway pressure, respiratory therapy assess nares and determine need for appliance change or resting period.  Outcome: Progressing

## 2024-12-28 NOTE — H&P
Attack Father     Kidney Disease Sister         stage 4-kidney failure    Cancer Sister     Heart Disease Sister     Obesity Sister     Cancer Sister     Heart Disease Sister     Obesity Sister     Alcohol Abuse Brother        Social History     Socioeconomic History    Marital status:      Spouse name: None    Number of children: None    Years of education: None    Highest education level: None   Tobacco Use    Smoking status: Former     Current packs/day: 0.00     Average packs/day: 0.5 packs/day for 33.0 years (16.5 ttl pk-yrs)     Types: Cigarettes     Start date: 1987     Quit date: 2020     Years since quittin.6    Smokeless tobacco: Never    Tobacco comments:     States quit 2021   Vaping Use    Vaping status: Never Used   Substance and Sexual Activity    Alcohol use: Not Currently    Drug use: No    Sexual activity: Not Currently     Partners: Female     Comment:      Social Determinants of Health     Food Insecurity: No Food Insecurity (2024)    Hunger Vital Sign     Worried About Running Out of Food in the Last Year: Never true     Ran Out of Food in the Last Year: Never true   Transportation Needs: No Transportation Needs (2024)    PRAPARE - Transportation     Lack of Transportation (Medical): No     Lack of Transportation (Non-Medical): No   Intimate Partner Violence: Not At Risk (2024)    Received from [facility]    Interpersonal Safety     Do you feel physically or emotionally unsafe where you currently live?: No     Within the past 12 months, have you been hit, slapped, kicked or otherwise physically hurt by anyone? : No     Within the past 12 months, have you been hit, slapped, kicked or otherwise physically hurt by anyone? : No   Housing Stability: Low Risk  (2024)    Housing Stability Vital Sign     Unable to Pay for Housing in the Last Year: No     Number of Times Moved in the Last Year: 1     Homeless in the

## 2024-12-28 NOTE — CARE COORDINATION
Case Management Assessment  Initial Evaluation    Date/Time of Evaluation: 12/28/2024 1:36 PM  Assessment Completed by: Claudia Campbell    If patient is discharged prior to next notation, then this note serves as note for discharge by case management.    Patient Name: Igor Ann                   YOB: 1970  Diagnosis: Pleural effusion [J90]  Noncompliance with medication regimen [Z91.148]  Acute on chronic systolic congestive heart failure (HCC) [I50.23]  Heart failure, acute systolic (HCC) [I50.21]  Acute respiratory failure with hypercapnia [J96.02]  Stage 5 chronic kidney disease on chronic dialysis (HCC) [N18.6, Z99.2]  Hypertension, unspecified type [I10]                   Date / Time: 12/27/2024  1:46 PM    Patient Admission Status: Inpatient   Readmission Risk (Low < 19, Mod (19-27), High > 27): Readmission Risk Score: 29    Current PCP: Vonnie Cleveland APRN - NP  PCP verified by CM? Yes (Megha)    Chart Reviewed: Yes      History Provided by: Patient  Patient Orientation: Alert and Oriented    Patient Cognition: Alert    Hospitalization in the last 30 days (Readmission):  No    If yes, Readmission Assessment in CM Navigator will be completed.    Advance Directives:      Code Status: Full Code   Patient's Primary Decision Maker is: Named in Scanned ACP Document    Primary Decision Maker: Joya Land - Step Child - [phone]    Secondary Decision Maker: Sridevi Salmeron - Brother/Sister - [phone]    Supplemental (Other) Decision Maker: Brianna Reyes - Step Child - [phone]    Discharge Planning:    Patient lives with: Other (Comment) ([facility]) Type of Home: Long-Term Care  Primary Care Giver: Self  Patient Support Systems include: Spouse/Significant Other   Current Financial resources: Medicare  Current community resources: None  Current services prior to admission: Oxygen Therapy, Durable Medical Equipment            Current DME: Walker, Oxygen Therapy (Comment)

## 2024-12-29 VITALS
HEART RATE: 82 BPM | BODY MASS INDEX: 32.91 KG/M2 | DIASTOLIC BLOOD PRESSURE: 69 MMHG | TEMPERATURE: 97.6 F | OXYGEN SATURATION: 100 % | HEIGHT: 69 IN | WEIGHT: 222.2 LBS | SYSTOLIC BLOOD PRESSURE: 133 MMHG | RESPIRATION RATE: 16 BRPM

## 2024-12-29 LAB
ANION GAP SERPL CALCULATED.3IONS-SCNC: 17 MMOL/L (ref 3–16)
BUN SERPL-MCNC: 57 MG/DL (ref 7–20)
CALCIUM SERPL-MCNC: 8.2 MG/DL (ref 8.3–10.6)
CHLORIDE SERPL-SCNC: 93 MMOL/L (ref 99–110)
CO2 SERPL-SCNC: 23 MMOL/L (ref 21–32)
CREAT SERPL-MCNC: 7.4 MG/DL (ref 0.9–1.3)
DEPRECATED RDW RBC AUTO: 19 % (ref 12.4–15.4)
GFR SERPLBLD CREATININE-BSD FMLA CKD-EPI: 8 ML/MIN/{1.73_M2}
GLUCOSE BLD-MCNC: 224 MG/DL (ref 70–99)
GLUCOSE BLD-MCNC: 226 MG/DL (ref 70–99)
GLUCOSE SERPL-MCNC: 245 MG/DL (ref 70–99)
HCT VFR BLD AUTO: 31.2 % (ref 40.5–52.5)
HGB BLD-MCNC: 9.9 G/DL (ref 13.5–17.5)
MCH RBC QN AUTO: 27.2 PG (ref 26–34)
MCHC RBC AUTO-ENTMCNC: 31.9 G/DL (ref 31–36)
MCV RBC AUTO: 85.4 FL (ref 80–100)
PERFORMED ON: ABNORMAL
PERFORMED ON: ABNORMAL
PLATELET # BLD AUTO: 248 K/UL (ref 135–450)
PMV BLD AUTO: 8.4 FL (ref 5–10.5)
POTASSIUM SERPL-SCNC: 4.3 MMOL/L (ref 3.5–5.1)
RBC # BLD AUTO: 3.65 M/UL (ref 4.2–5.9)
SODIUM SERPL-SCNC: 133 MMOL/L (ref 136–145)
WBC # BLD AUTO: 9.7 K/UL (ref 4–11)

## 2024-12-29 PROCEDURE — 2500000003 HC RX 250 WO HCPCS: Performed by: INTERNAL MEDICINE

## 2024-12-29 PROCEDURE — 2700000000 HC OXYGEN THERAPY PER DAY

## 2024-12-29 PROCEDURE — 94761 N-INVAS EAR/PLS OXIMETRY MLT: CPT

## 2024-12-29 PROCEDURE — 80048 BASIC METABOLIC PNL TOTAL CA: CPT

## 2024-12-29 PROCEDURE — 36415 COLL VENOUS BLD VENIPUNCTURE: CPT

## 2024-12-29 PROCEDURE — 99232 SBSQ HOSP IP/OBS MODERATE 35: CPT | Performed by: INTERNAL MEDICINE

## 2024-12-29 PROCEDURE — 6370000000 HC RX 637 (ALT 250 FOR IP): Performed by: INTERNAL MEDICINE

## 2024-12-29 PROCEDURE — 85027 COMPLETE CBC AUTOMATED: CPT

## 2024-12-29 PROCEDURE — 94640 AIRWAY INHALATION TREATMENT: CPT

## 2024-12-29 PROCEDURE — 99233 SBSQ HOSP IP/OBS HIGH 50: CPT | Performed by: INTERNAL MEDICINE

## 2024-12-29 PROCEDURE — 99239 HOSP IP/OBS DSCHRG MGMT >30: CPT | Performed by: INTERNAL MEDICINE

## 2024-12-29 PROCEDURE — 6360000002 HC RX W HCPCS: Performed by: INTERNAL MEDICINE

## 2024-12-29 PROCEDURE — 94660 CPAP INITIATION&MGMT: CPT

## 2024-12-29 PROCEDURE — 36556 INSERT NON-TUNNEL CV CATH: CPT

## 2024-12-29 RX ORDER — PANTOPRAZOLE SODIUM 40 MG/1
40 TABLET, DELAYED RELEASE ORAL DAILY
Qty: 1 TABLET | Refills: 0
Start: 2024-12-29

## 2024-12-29 RX ORDER — IPRATROPIUM BROMIDE AND ALBUTEROL SULFATE 2.5; .5 MG/3ML; MG/3ML
1 SOLUTION RESPIRATORY (INHALATION)
Status: DISCONTINUED | OUTPATIENT
Start: 2024-12-29 | End: 2024-12-29 | Stop reason: HOSPADM

## 2024-12-29 RX ORDER — PREGABALIN 25 MG/1
25 CAPSULE ORAL DAILY
Qty: 2 CAPSULE | Refills: 0 | Status: SHIPPED | OUTPATIENT
Start: 2024-12-29 | End: 2024-12-31

## 2024-12-29 RX ADMIN — PANTOPRAZOLE SODIUM 40 MG: 40 TABLET, DELAYED RELEASE ORAL at 09:46

## 2024-12-29 RX ADMIN — ISOSORBIDE MONONITRATE 30 MG: 30 TABLET, EXTENDED RELEASE ORAL at 09:40

## 2024-12-29 RX ADMIN — METOPROLOL SUCCINATE 25 MG: 25 TABLET, EXTENDED RELEASE ORAL at 09:40

## 2024-12-29 RX ADMIN — INSULIN LISPRO 1 UNITS: 100 INJECTION, SOLUTION INTRAVENOUS; SUBCUTANEOUS at 12:33

## 2024-12-29 RX ADMIN — INSULIN LISPRO 1 UNITS: 100 INJECTION, SOLUTION INTRAVENOUS; SUBCUTANEOUS at 09:41

## 2024-12-29 RX ADMIN — SACUBITRIL AND VALSARTAN 1 TABLET: 24; 26 TABLET, FILM COATED ORAL at 09:40

## 2024-12-29 RX ADMIN — FUROSEMIDE 40 MG: 10 INJECTION, SOLUTION INTRAMUSCULAR; INTRAVENOUS at 09:40

## 2024-12-29 RX ADMIN — IPRATROPIUM BROMIDE AND ALBUTEROL SULFATE 1 DOSE: 2.5; .5 SOLUTION RESPIRATORY (INHALATION) at 08:20

## 2024-12-29 RX ADMIN — SODIUM CHLORIDE, PRESERVATIVE FREE 10 ML: 5 INJECTION INTRAVENOUS at 09:40

## 2024-12-29 RX ADMIN — ATORVASTATIN CALCIUM 40 MG: 40 TABLET, FILM COATED ORAL at 09:40

## 2024-12-29 RX ADMIN — CLOPIDOGREL BISULFATE 75 MG: 75 TABLET ORAL at 09:40

## 2024-12-29 RX ADMIN — APIXABAN 2.5 MG: 5 TABLET, FILM COATED ORAL at 09:40

## 2024-12-29 RX ADMIN — BUSPIRONE HYDROCHLORIDE 10 MG: 10 TABLET ORAL at 09:40

## 2024-12-29 ASSESSMENT — PAIN SCALES - GENERAL: PAINLEVEL_OUTOF10: 0

## 2024-12-29 NOTE — PLAN OF CARE
Problem: Chronic Conditions and Co-morbidities  Goal: Patient's chronic conditions and co-morbidity symptoms are monitored and maintained or improved  12/28/2024 2243 by Nicolás Perea RN  Outcome: Progressing  Flowsheets (Taken 12/28/2024 2015)  Care Plan - Patient's Chronic Conditions and Co-Morbidity Symptoms are Monitored and Maintained or Improved: Monitor and assess patient's chronic conditions and comorbid symptoms for stability, deterioration, or improvement  12/28/2024 1559 by Iliana Street RN  Outcome: Progressing     Problem: Discharge Planning  Goal: Discharge to home or other facility with appropriate resources  12/28/2024 2243 by Nicolás Perea RN  Outcome: Progressing  Flowsheets (Taken 12/28/2024 2015)  Discharge to home or other facility with appropriate resources: Identify barriers to discharge with patient and caregiver  12/28/2024 1559 by Iliana Street RN  Outcome: Progressing     Problem: Pain  Goal: Verbalizes/displays adequate comfort level or baseline comfort level  12/28/2024 2243 by Nicolás Perea RN  Outcome: Progressing  12/28/2024 1559 by Iliaan Street RN  Outcome: Progressing     Problem: Safety - Adult  Goal: Free from fall injury  12/28/2024 2243 by Nicolás Perea RN  Outcome: Progressing  12/28/2024 1559 by Iliana Street RN  Outcome: Progressing     Problem: ABCDS Injury Assessment  Goal: Absence of physical injury  12/28/2024 2243 by Nicolás Perea RN  Outcome: Progressing  12/28/2024 1559 by Iliana Street RN  Outcome: Progressing     Problem: Skin/Tissue Integrity  Goal: Absence of new skin breakdown  Description: 1.  Monitor for areas of redness and/or skin breakdown  2.  Assess vascular access sites hourly  3.  Every 4-6 hours minimum:  Change oxygen saturation probe site  4.  Every 4-6 hours:  If on nasal continuous positive airway pressure, respiratory therapy assess nares and determine need for appliance change or resting period.  12/28/2024 2243 by

## 2024-12-29 NOTE — DISCHARGE SUMMARY
to SNF    Follow Up:  Follow up with PCP.    Total time spent on discharge is 35 minutes      GALLITO RODRIGUEZ MD 12/29/2024 1:30 PM

## 2024-12-29 NOTE — DISCHARGE INSTR - COC
Nursing Facility for less 30 days.     Update Admission H&P: No change in H&P    PHYSICIAN SIGNATURE:  Electronically signed by GALLITO RODRIGUEZ MD on 12/29/24 at 1:28 PM EST

## 2024-12-29 NOTE — DISCHARGE INSTR - DIET
Good nutrition is important when healing from an illness, injury, or surgery.  Follow any nutrition recommendations given to you during your hospital stay.   If you were given an oral nutrition supplement while in the hospital, continue to take this supplement at home.  You can take it with meals, in-between meals, and/or before bedtime. These supplements can be purchased at most local grocery stores, pharmacies, and chain [employer]-stores.   If you have any questions about your diet or nutrition, call the hospital and ask for the dietitian.

## 2024-12-29 NOTE — FLOWSHEET NOTE
12/29/24 1449   Discharge Event   Discharged with Documented Belongings Yes  (Left in clothing)   Departure Mode In ambulance   Mobility at Departure Stretcher   Discharged to Nursing Home

## 2024-12-29 NOTE — PROGRESS NOTES
Progress Note    HISTORY     CC:   Shortness of breath           We are following for ESRD      Subjective/   HPI:  Patient had dialysis on 12/28 with post weight of 102.6 ( 3.5 kg removed ).  He tolerated well and his TDC is working.  He still feels short of breath.  BP has been better     ROS:  Constitutional:  No fevers, No Chills, + weakness  Cardiovascular:  No palpations, no edema  Respiratory:  No wheezing, no cough  Skin:  No rash, no itching  :  No hematuria, not making urine     Social Hx:  No Family at the bedside     Past Medical and Surgical History:  - Reviewed, no changes     EXAM       Objective/     Vitals:    12/29/24 0700 12/29/24 0800 12/29/24 0840 12/29/24 0900   BP: (!) 163/92 (!) 144/67  (!) 143/74   Pulse: 87 83 81 81   Resp:  16  14   Temp: 97.3 °F (36.3 °C) 97.6 °F (36.4 °C)     TempSrc: Temporal Oral     SpO2: 100% 100% 100% 100%   Weight:       Height:         24HR INTAKE/OUTPUT:    Intake/Output Summary (Last 24 hours) at 12/29/2024 0922  Last data filed at 12/29/2024 0800  Gross per 24 hour   Intake 1358 ml   Output 25 ml   Net 1333 ml     Constitutional:  Ill appearing   Eyes:  Pupils reactive, sclera clear   Neck:  Normal thyroid, no masses   Cardiovascular:  Regular, no rub  Respiratory:  No distress, no wheezing, on supplemental oxygen   Psychiatry:  Flat mood/affect, alert  Abdomen: +bs, soft, nt, no masses   Musculoskeletal: No LE edema, no clubbing   Lymphatics:  No LAD in neck, no supraclavicular nodes   Access:  TDC      MEDICAL DECISION MAKING       Data/  Recent Labs     12/27/24  1426 12/28/24  0433 12/29/24  0446   WBC 10.6 6.0 9.7   HGB 10.4* 9.8* 9.9*   HCT 32.6* 30.6* 31.2*   MCV 84.8 85.1 85.4    228 248     Recent Labs     12/27/24  1426 12/28/24  0433 12/29/24  0446    130* 133*   K 5.1 5.9* 4.3   CL 91* 92* 93*   CO2 26 21 23   GLUCOSE 138* 190* 245*   BUN 89* 105* 57*   CREATININE 8.1* 10.8* 7.4*   LABGLOM 7* 5* 8*       Assessment/ 
   12/27/24 2100   RT Protocol   History Pulmonary Disease 2   Respiratory pattern 2   Breath sounds 2   Cough 0   Indications for Bronchodilator Therapy Decreased or absent breath sounds   Bronchodilator Assessment Score 6     RT Inhaler-Nebulizer Bronchodilator Protocol Note    There is a bronchodilator order in the chart from a provider indicating to follow the RT Bronchodilator Protocol and there is an “Initiate RT Inhaler-Nebulizer Bronchodilator Protocol” order as well (see protocol at bottom of note).    CXR Findings:  XR CHEST PORTABLE    Result Date: 12/27/2024  Cardiomegaly with moderate left greater than right pleural effusions and bibasilar airspace opacities. Findings are most likely due to CHF exacerbation. Superimposed infection cannot be excluded.       The findings from the last RT Protocol Assessment were as follows:   History Pulmonary Disease: Chronic pulmonary disease  Respiratory Pattern: Dyspnea on exertion or RR 21-25 bpm  Breath Sounds: Slightly diminished and/or crackles  Cough: Strong, spontaneous, non-productive  Indication for Bronchodilator Therapy: Decreased or absent breath sounds  Bronchodilator Assessment Score: 6    Aerosolized bronchodilator medication orders have been revised according to the RT Inhaler-Nebulizer Bronchodilator Protocol below.    Respiratory Therapist to perform RT Therapy Protocol Assessment initially then follow the protocol.  Repeat RT Therapy Protocol Assessment PRN for score 0-3 or on second treatment, BID, and PRN for scores above 3.    No Indications - adjust the frequency to every 6 hours PRN wheezing or bronchospasm, if no treatments needed after 48 hours then discontinue using Per Protocol order mode.     If indication present, adjust the RT bronchodilator orders based on the Bronchodilator Assessment Score as indicated below.  Use Inhaler orders unless patient has one or more of the following: on home nebulizer, not able to hold breath for 10 seconds, 
   12/27/24 2132   NIV Type   Equipment Type v60   Mode Bilevel   Mask Type Full face mask   Mask Size Medium   Settings/Measurements   IPAP (S)  20 cmH20   CPAP/EPAP 8 cmH2O   Vt (Measured) 610 mL   Rate Ordered 12   FiO2  40 %   Minute Volume (L/min) 14.7 Liters   Mask Leak (lpm) 0 lpm   Patient's Home Machine No   Patient Observation   Patient Observations spo2 97% on 40% bipap       
   12/27/24 2257   NIV Type   Equipment Type v60   Mode Bilevel   Mask Type Full face mask   Mask Size Medium   Assessment   Pulse 93   Respirations 23   SpO2 100 %   Settings/Measurements   IPAP 20 cmH20   CPAP/EPAP 8 cmH2O   Vt (Measured) 654 mL   Rate Ordered 12   FiO2  40 %   Minute Volume (L/min) 18.2 Liters   Mask Leak (lpm) 9 lpm   Patient's Home Machine No   Patient Observation   Patient Observations spo2 100% on 40% bipap       
   12/28/24 0306   NIV Type   Equipment Type v60   Mode Bilevel   Mask Type Full face mask   Mask Size Medium   Settings/Measurements   IPAP 20 cmH20   CPAP/EPAP 8 cmH2O   Vt (Measured) 969 mL   Rate Ordered 12   FiO2  40 %   Minute Volume (L/min) 18.2 Liters   Mask Leak (lpm) 10 lpm   Patient Observation   Patient Observations spo2 100% on 40% bipap       
   12/28/24 0800   RT Protocol   History Pulmonary Disease 2   Respiratory pattern 2   Breath sounds 2   Cough 0   Indications for Bronchodilator Therapy Decreased or absent breath sounds   Bronchodilator Assessment Score 6       
   12/29/24 0011   NIV Type   NIV Started/Stopped On   Equipment Type v60   Mode Bilevel   Mask Type Full face mask   Mask Size Medium   Assessment   Pulse 96   SpO2 96 %   Comfort Level Good   Using Accessory Muscles No   Mask Compliance Good   Skin Assessment Clean, dry, & intact   Skin Protection for O2 Device Yes   Location Nose   Breath Sounds   Respiratory Pattern Regular   Right Upper Lobe Diminished   Right Middle Lobe Diminished   Right Lower Lobe Diminished   Left Upper Lobe Diminished   Left Lower Lobe Diminished   Settings/Measurements   PIP Observed 19 cm H20   IPAP 18 cmH20   CPAP/EPAP 8 cmH2O   Vt (Measured) 679 mL   Rate Ordered 12   Insp Rise Time (%) 1 %   FiO2  35 %   I Time/ I Time % 0.8 s   Minute Volume (L/min) 21.6 Liters   Mask Leak (lpm) 0 lpm   Patient's Home Machine No   Alarm Settings   Alarms On Y   Low Pressure (cmH2O) 8 cmH2O   High Pressure (cmH2O) 35 cmH2O   Apnea (secs) 20 secs   RR Low (bpm) 8   RR High (bpm) 45 br/min       
   12/29/24 0800   RT Protocol   History Pulmonary Disease 2   Respiratory pattern 2   Breath sounds 2   Cough 0   Indications for Bronchodilator Therapy Decreased or absent breath sounds   Bronchodilator Assessment Score 6       
2 ultrasound IVs placed in RUE by EDISON Sams RN  
4 Eyes Skin Assessment     NAME:  Igor Ann  YOB: 1970  MEDICAL RECORD NUMBER:  [id number]    The patient is being assessed for  Admission    I agree that at least one RN has performed a thorough Head to Toe Skin Assessment on the patient. ALL assessment sites listed below have been assessed.      Areas assessed by both nurses:    Head, Face, Ears, Shoulders, Back, Chest, Arms, Elbows, Hands, Sacrum. Buttock, Coccyx, Ischium, Legs. Feet and Heels, and Under Medical Devices         Does the Patient have a Wound? Yes wound(s) were present on assessment. LDA wound assessment was Initiated and completed by RN    Refused skin assessment to buttock due to SOB and unable to lay flat, wound on umbilicus with dried blood, abrasions to left foot and toes various stages of healing, partial right foot amputation healed incision.          Isaac Prevention initiated by RN: Yes  Wound Care Orders initiated by RN: Yes    Pressure Injury (Stage 3,4, Unstageable, DTI, NWPT, and Complex wounds) if present, place Wound referral order by RN under : No    New Ostomies, if present place, Ostomy referral order under : No     Nurse 1 eSignature: Electronically signed by Nicolás Perea RN on 12/28/24 at 1:11 AM EST    **SHARE this note so that the co-signing nurse can place an eSignature**    Nurse 2 eSignature: Electronically signed by Chelo Sumner RN on 12/28/24 at 1:16 AM EST     Patient is able to demonstrate the ability to move from a reclining position to an upright position within the recliner.   
AM labs improved, patient asking about what his discharge plans are.  States he wants to go home instead of nursing home, he states he was there for his wound to heal on his right foot partial amputation, now that site has healed states that he wants to go home, and that he was just supposed to go to nursing home for rehab.  Will pass off in report patient wishes.  Not SOB tonight, did wear bipap briefly.  Currently on oxygen 4 liters NC.  Frequently taking off BP cuff.  Will continue to monitor, refused bath.  Small void tonight 25ml.   Call light in reach.   
Bedside report given to Nicolás Foster RN. POC transferred. NAVJOT Lopez       
BiPAP in place, lung sounds diminished, denies needs, call light in reach, will continue to monitor.   
Bipap to 18/8 and 35% at this time  
Cardiology consult called to answering service, Electronically signed by Charan Mcdowell on 12/27/2024 at 6:58 PM  
Dr. Ricci returned call, states we will plan for dialysis tomorrow am.    
HEART FAILURE CARE PLAN:    Comorbidities Reviewed: Yes   Patient has a past medical history of Ambulatory dysfunction, Aortic stenosis, Arthritis, Asthma, Bilateral hilar adenopathy syndrome, CAD (coronary artery disease), Cardiomyopathy ([facility]), CHF, EF 35-40% (July 2024), Chronic pain, COPD (chronic obstructive pulmonary disease) ([facility]), CVA (cerebral vascular accident) ([facility]), Depression, Diabetes mellitus ([facility]), Difficult intravenous access, Emphysema of lung ([facility]), ESRD (end stage renal disease) on dialysis ([facility]), Fear of needles, Gastric ulcer, GERD (gastroesophageal reflux disease), HD, T/R/Sa, Heart valve problem, History of spinal fracture, History of transcatheter aortic valve replacement (TAVR), Hx of blood clots, Hyperlipidemia, Hypertension, MI (myocardial infarction) ([facility]), Neuromuscular disorder ([facility]), Numbness and tingling in left arm, Pneumonia, PONV (postoperative nausea and vomiting), Prolonged emergence from general anesthesia, Sleep apnea, Stroke ([facility]), TIA (transient ischemic attack), and Unspecified diseases of blood and blood-forming organs.     Weights Reviewed: Yes   Admission weight: 105.2 kg (232 lb)   Wt Readings from Last 3 Encounters:   12/29/24 100.8 kg (222 lb 3.2 oz)   10/23/24 94.7 kg (208 lb 12.4 oz)   08/13/24 96.4 kg (212 lb 9.6 oz)     Intake & Output Reviewed: Yes     Intake/Output Summary (Last 24 hours) at 12/29/2024 1002  Last data filed at 12/29/2024 0950  Gross per 24 hour   Intake 1588 ml   Output 25 ml   Net 1563 ml       ECHOCARDIOGRAM Reviewed: Yes   Patient's Ejection Fraction (EF) is less than or equal to 40%. Discuss HFrEF Guideline Directed Medical Therapy (GDMT) with Cardiologist or Hospitalist:          Medications Reviewed: Yes   SCHEDULED HOSPITAL MEDICATIONS:   ipratropium 0.5 mg-albuterol 2.5 mg  1 Dose Inhalation BID RT    insulin lispro  0-4 Units SubCUTAneous 4x Daily AC & HS    insulin glargine  15 Units SubCUTAneous Nightly    epoetin siddharth-epbx  5,000 
Lester sent to dr. Neil for nephrology consult   
Patient a/o, denies SOB, on home dose of oxygen 4 liters Per NC, non-labored.  Lung sounds diminished.  Bowel sounds active, good appetite.  BM today.  Oliguric, had dialysis today.  Will continue to monitor.  Call light in reach.  Refused bath.    
Patient admission complete. Assessment, lung sounds diminished, SOB with exertion, unable to lay flat, patient refuses brief changed, darrick area cleaned due to SOB.  Patient does dialysis Tuesday/Thursday/Saturday, states had 10 minutes of treatment today before being sent to hospital, did not do treatment on Thursday.  On oxygen 6 liters NC, dyspnea with exertion.  Bowel sounds active, BM yesterday.  Patient states still voids at times, received lasix in ER, BNP >70,000.  Dialysis to be done in am.  Patient asking for food, diabetic, on lantus, blood sugar 231, nocturnist notified medication list updated.  History of wounds to right foot, partial right foot amputation, no open areas at present.  Left foot several scattered abrasions various levels of healing.  Umbilicus wound/abraision old blood noted.  Oriented to orders, call light in reach, bed alarm on.  Will continue to monitor.   
Patient awake in bed. HD nurse in room. Patient A&Ox4. VS obtained as charted in flowsheet. Patient denies any further needs at this time. Call light in reach and bed in lowest position.   
Patient awakens confused to place,time, situation, reorients easily, lung sounds diminished, BiPAP in place.  SOB with exertion.  Call light in reach, will continue to monitor.   
Patient placed on bipap for the hs on settings of 20/8 40%  
Patient's BP elevated, notified Dr. Coombs, awaiting orders.    
Perfect serve unread. Called Nephrology consult, awaiting call back.   
Pt admitted into ICU 10 from ED. Pt alert/oriented x 3. Following commands. Pt wanting to eat. No IV access as RAC was out upon RN arriving to ER.      SR 87 , /74 (97), SpO2 100 % on 6L/NC. Call light within reach. Bed locked in lowest position. Bed alarm turned on.     Patient is able to demonstrate the ability to move from a reclining position to an upright position within the recliner.  
RT Nebulizer Bronchodilator Protocol Note    There is a bronchodilator order in the chart from a provider indicating to follow the RT Bronchodilator Protocol and there is an “Initiate RT Bronchodilator Protocol” order as well (see protocol at bottom of note).    CXR Findings:  XR CHEST PORTABLE    Result Date: 12/27/2024  Cardiomegaly with moderate left greater than right pleural effusions and bibasilar airspace opacities. Findings are most likely due to CHF exacerbation. Superimposed infection cannot be excluded.       The findings from the last RT Protocol Assessment were as follows:  Smoking: (P) Chronic pulmonary disease  Respiratory Pattern: (P) Dyspnea on exertion or RR 21-25 bpm  Breath Sounds: (P) Slightly diminished and/or crackles  Cough: (P) Strong, spontaneous, non-productive  Indication for Bronchodilator Therapy: (P) Decreased or absent breath sounds  Bronchodilator Assessment Score: (P) 6    Aerosolized bronchodilator medication orders have been revised according to the RT Nebulizer Bronchodilator Protocol below.    Respiratory Therapist to perform RT Therapy Protocol Assessment initially then follow the protocol.  Repeat RT Therapy Protocol Assessment PRN for score 0-3 or on second treatment, BID, and PRN for scores above 3.    No Indications - adjust the frequency to every 6 hours PRN wheezing or bronchospasm, if no treatments needed after 48 hours then discontinue using Per Protocol order mode.     If indication present, adjust the RT bronchodilator orders based on the Bronchodilator Assessment Score as indicated below.  If a patient is on this medication at home then do not decrease Frequency below that used at home.    0-3 - enter or revise RT bronchodilator order(s) to equivalent RT Bronchodilator order with Frequency of every 4 hours PRN for wheezing or increased work of breathing using Per Protocol order mode.       4-6 - enter or revise RT Bronchodilator order(s) to two equivalent RT 
Report called to [facility] at 3:11 PM    
Treatment time: 4 hours    Net UF: 3500 ml    Pre weight: 106.1 kg  Post weight: 102.6 kg  EDW: TBD    Access used:   Lt chest wall tunneled CVC    Access function:   Well    Medications or blood products given:   Retacrit 5000 units  Heparin dwells    Regular outpatient schedule:   PRANAY Turner    Summary of response to treatment:   Patient has completed 4 hour hemodialysis treatment. He tolerated treatment well and was able to remove 3500 ml. Report was given to Iliana Street RN.    Copy of dialysis treatment record placed in chart, to be scanned into EMR.  
[facility] Daily Progress Note      Admit Date:  12/27/2024    Subjective:  Mr. Ann is seen for CHF  C/o chest being tight  No changes from before  He is on O2 2l/min  History of Present Illness:  Igor Ann is a 54 y.o. patient who presented to the hospital with complaints of shortness of breath and chest tightness.  He is admitted with fluid overload and hyperkalemia.  He underwent emergency dialysis on day of admission and today feels better he is on O2 1/min   Patient in ICU. He has ESRD. Missed a dialysis and subsequently became short of breath so presented in emergency room.  He has history of CAD chronic systolic CHF, COPD, CAD, DM2, Parox afib.s/p TAVR, HF rEF, ischemic cardiomyopathyPVD CVA ZAINAB  and non compliance with hemodialysis.    Epic chart reviewed last seen by Nicolás Ceja CNP in our office on 8.13.24   He has a history of PATRICIA LAD in 5/2015. LHC 2017 showed nonobstructive CAD and patent stent. He had PTA/stent R external iliac 5/2019. Echo 6/2019 showed EF 55%, bicuspid aortic valve with severe stenosis. LHC 8/2019 showed patent LAD stent. He had TAVR 10/2019.  Echo 3/2023 showed EF 25-30%.  Stress test abnormal.  LHC 5/9/2023 showed severe multivessel CAD, PATRICIA ramus, PATRICIA LCx, PATRICIA RCA.  He has had numerous hospitalizations 2023 for acute on chronic HFrEF, hypertensive urgency in the setting of missed HD and noncompliance with medications.  Most recently admitted 1/2024 for shortness of breath and missed HD sessions.  Treated for acute on chronic HFrEF and hypertensive emergency.  Most recently admitted 7/2024 for chest pain and LHC showed multivessel CAD, per heart team discussion felt better suited with PCI of RCA and medical management of residual CAD.  Echo showed EF 35-40%.  [facility].  Evaluated by CT surgery and not a candidate for CABG due to excessive risk.  Consider PCI and outpatient follow-up. IVUS of RCA   10.21.24   Laser atherectomy 
and wean to NC  Follow CXR ,on Plavix,if persist will stiop and drain ,could be as OP  Had stent less than 1 year   On elquis ,will get records and resume as needed  Check procal  No fever ,wbc ok   Resume elquis2. 5 mg bid   BD  Diet  PT OT  Transfer step down   
Yes Efren Lawrence MD   insulin glargine (LANTUS) 100 UNIT/ML injection vial Inject 15 Units into the skin nightly 1/6/24  Yes Gómez Hwang MD   busPIRone (BUSPAR) 10 MG tablet Take 1 tablet by mouth 3 times daily 1/6/24  Yes Gómez Hwang MD   apixaban (ELIQUIS) 5 MG TABS tablet Take 1 tablet by mouth 2 times daily   Yes ProviderGerson MD   traZODone (DESYREL) 50 MG tablet Take 0.5 tablets by mouth nightly 4/16/23  Yes ProviderGerson MD   DULoxetine (CYMBALTA) 60 MG extended release capsule Take 1 capsule by mouth daily 3/17/23  Yes Jackie Krause MD   ipratropium-albuterol (DUONEB) 0.5-2.5 (3) MG/3ML SOLN nebulizer solution Inhale 3 mLs into the lungs every 6 hours as needed for Shortness of Breath 10/15/17  Yes Akash Lo MD      Diet Reviewed: Yes   ADULT DIET; Regular; 4 carb choices (60 gm/meal); Low Sodium (2 gm); Low Potassium (Less than 3000 mg/day); Low Phosphorus (Less than 1000 mg); 1500 ml    Goal of Care Reviewed: Yes   Patient and/or Family's stated Goal of Care this Admission: Reduce shortness of breath, increase activity tolerance, better understand heart failure and disease management, be more comfortable, and reduce lower extremity edema prior to discharge.     Electronically signed by Nicolás Perea RN on 12/28/2024 at 6:20 AM

## 2025-01-02 LAB — REPORT COMMENT: NORMAL

## 2025-01-03 LAB
A/G RATIO: 1.3 RATIO (ref 0.8–2.6)
ALBUMIN: 3.4 G/DL (ref 3.5–5.2)
ALP BLD-CCNC: 150 U/L (ref 23–144)
ALT SERPL-CCNC: 9 U/L (ref 0–60)
AST SERPL-CCNC: 10 U/L (ref 0–55)
BASOPHILS ABSOLUTE: 0.1 K/UL (ref 0–0.3)
BASOPHILS RELATIVE PERCENT: 0.4 % (ref 0–2)
BILIRUB SERPL-MCNC: 0.2 MG/DL (ref 0–1.2)
BUN / CREAT RATIO: 11 (ref 7–25)
BUN BLDV-MCNC: 88 MG/DL (ref 3–29)
CALCIUM SERPL-MCNC: 8.1 MG/DL (ref 8.5–10.5)
CHLORIDE BLD-SCNC: 92 MEQ/L (ref 96–110)
CO2: 19 MEQ/L (ref 19–32)
CREAT SERPL-MCNC: 8.2 MG/DL (ref 0.5–1.2)
DIFFERENTIAL COUNT: ABNORMAL
EOSINOPHILS ABSOLUTE: 0.4 K/UL (ref 0–0.5)
EOSINOPHILS RELATIVE PERCENT: 3.3 % (ref 0–5)
ESTIMATED GLOMERULAR FILTRATION RATE CREATININE EQUATION: 7 MLS/MIN/1.73M2
FASTING STATUS: ABNORMAL
GLOBULIN: 2.6 G/DL (ref 1.9–3.6)
GLUCOSE BLD-MCNC: 247 MG/DL (ref 70–99)
HCT VFR BLD CALC: 35.4 % (ref 37.5–51)
HEMOGLOBIN: 10.5 G/DL (ref 13–17.7)
IMMATURE GRANS (ABS): 0.1 K/UL (ref 0–0.1)
IMMATURE GRANULOCYTES %: 0.9 %
LYMPHOCYTES ABSOLUTE: 1 K/UL (ref 0.9–4.1)
LYMPHOCYTES RELATIVE PERCENT: 7.7 % (ref 14–51)
MCH RBC QN AUTO: 26.1 PG (ref 26–34)
MCHC RBC AUTO-ENTMCNC: 29.7 G/DL (ref 30.7–35.5)
MCV RBC AUTO: 88.1 FL (ref 80–100)
MONOCYTES ABSOLUTE: 0.6 K/UL (ref 0.2–1)
MONOCYTES RELATIVE PERCENT: 4.7 % (ref 4–12)
NEUTROPHILS ABSOLUTE: 10.5 K/UL (ref 1.8–7.5)
NEUTROPHILS RELATIVE PERCENT: 83 % (ref 42–80)
PDW BLD-RTO: 17 %
PLATELET # BLD: 210 K/UL (ref 140–400)
PMV BLD AUTO: 10.9 FL (ref 7.2–11.7)
POTASSIUM SERPL-SCNC: 4.6 MEQ/L (ref 3.4–5.3)
RBC # BLD: 4.02 M/UL (ref 4.14–5.8)
RETICULOCYTE ABSOLUTE COUNT: 0 /100 WBC
SODIUM BLD-SCNC: 136 MEQ/L (ref 135–148)
TOTAL PROTEIN: 6 G/DL (ref 6–8.3)
WBC # BLD: 12.6 K/UL (ref 3.5–10.9)

## 2025-01-06 ENCOUNTER — HOSPITAL ENCOUNTER (EMERGENCY)
Age: 55
Discharge: ANOTHER HOSPITAL | End: 2025-01-07
Attending: STUDENT IN AN ORGANIZED HEALTH CARE EDUCATION/TRAINING PROGRAM
Payer: MEDICARE

## 2025-01-06 ENCOUNTER — APPOINTMENT (OUTPATIENT)
Dept: GENERAL RADIOLOGY | Age: 55
End: 2025-01-06
Payer: MEDICARE

## 2025-01-06 DIAGNOSIS — I21.4 NSTEMI (NON-ST ELEVATED MYOCARDIAL INFARCTION) (HCC): ICD-10-CM

## 2025-01-06 DIAGNOSIS — I48.91 ATRIAL FIBRILLATION WITH RVR (HCC): Primary | ICD-10-CM

## 2025-01-06 LAB
BASE EXCESS BLDV CALC-SCNC: 0.6 MMOL/L (ref -3–3)
CO2 BLDV-SCNC: 28 MMOL/L
COHGB MFR BLDV: 2.6 % (ref 0–1.5)
HCO3 BLDV-SCNC: 26.4 MMOL/L (ref 23–29)
METHGB MFR BLDV: 0.3 %
O2 THERAPY: ABNORMAL
PCO2 BLDV: 48 MMHG (ref 40–50)
PH BLDV: 7.36 [PH] (ref 7.35–7.45)
PO2 BLDV: 50 MMHG (ref 25–40)
SAO2 % BLDV: 84 %

## 2025-01-06 PROCEDURE — 84484 ASSAY OF TROPONIN QUANT: CPT

## 2025-01-06 PROCEDURE — 82803 BLOOD GASES ANY COMBINATION: CPT

## 2025-01-06 PROCEDURE — 85025 COMPLETE CBC W/AUTO DIFF WBC: CPT

## 2025-01-06 PROCEDURE — 87804 INFLUENZA ASSAY W/OPTIC: CPT

## 2025-01-06 PROCEDURE — 80053 COMPREHEN METABOLIC PANEL: CPT

## 2025-01-06 PROCEDURE — 87807 RSV ASSAY W/OPTIC: CPT

## 2025-01-06 PROCEDURE — 71045 X-RAY EXAM CHEST 1 VIEW: CPT

## 2025-01-06 PROCEDURE — 83880 ASSAY OF NATRIURETIC PEPTIDE: CPT

## 2025-01-06 PROCEDURE — 93005 ELECTROCARDIOGRAM TRACING: CPT | Performed by: STUDENT IN AN ORGANIZED HEALTH CARE EDUCATION/TRAINING PROGRAM

## 2025-01-06 PROCEDURE — 96376 TX/PRO/DX INJ SAME DRUG ADON: CPT

## 2025-01-06 PROCEDURE — 87635 SARS-COV-2 COVID-19 AMP PRB: CPT

## 2025-01-06 PROCEDURE — 99285 EMERGENCY DEPT VISIT HI MDM: CPT

## 2025-01-06 ASSESSMENT — PAIN - FUNCTIONAL ASSESSMENT: PAIN_FUNCTIONAL_ASSESSMENT: NONE - DENIES PAIN

## 2025-01-06 ASSESSMENT — PAIN SCALES - GENERAL: PAINLEVEL_OUTOF10: 0

## 2025-01-07 ENCOUNTER — HOSPITAL ENCOUNTER (INPATIENT)
Age: 55
LOS: 9 days | End: 2025-01-16
Attending: INTERNAL MEDICINE | Admitting: INTERNAL MEDICINE
Payer: MEDICARE

## 2025-01-07 VITALS
TEMPERATURE: 98.2 F | HEIGHT: 69 IN | RESPIRATION RATE: 16 BRPM | SYSTOLIC BLOOD PRESSURE: 150 MMHG | OXYGEN SATURATION: 98 % | HEART RATE: 101 BPM | BODY MASS INDEX: 32.58 KG/M2 | DIASTOLIC BLOOD PRESSURE: 98 MMHG | WEIGHT: 220 LBS

## 2025-01-07 DIAGNOSIS — I20.9 ANGINA PECTORIS (HCC): ICD-10-CM

## 2025-01-07 DIAGNOSIS — I42.9 CARDIOMYOPATHY, UNSPECIFIED TYPE (HCC): Primary | ICD-10-CM

## 2025-01-07 DIAGNOSIS — I50.21 HEART FAILURE, ACUTE SYSTOLIC (HCC): ICD-10-CM

## 2025-01-07 DIAGNOSIS — I50.21 ACUTE SYSTOLIC CHF (CONGESTIVE HEART FAILURE) (HCC): ICD-10-CM

## 2025-01-07 DIAGNOSIS — R06.02 SHORTNESS OF BREATH: ICD-10-CM

## 2025-01-07 PROBLEM — I48.91 ATRIAL FIBRILLATION WITH RAPID VENTRICULAR RESPONSE (HCC): Status: ACTIVE | Noted: 2025-01-07

## 2025-01-07 LAB
ALBUMIN SERPL-MCNC: 3.2 G/DL (ref 3.4–5)
ALBUMIN SERPL-MCNC: 3.4 G/DL (ref 3.4–5)
ALBUMIN/GLOB SERPL: 1 {RATIO} (ref 1.1–2.2)
ALBUMIN/GLOB SERPL: 1 {RATIO} (ref 1.1–2.2)
ALP SERPL-CCNC: 130 U/L (ref 40–129)
ALP SERPL-CCNC: 142 U/L (ref 40–129)
ALT SERPL-CCNC: 17 U/L (ref 10–40)
ALT SERPL-CCNC: 19 U/L (ref 10–40)
ANION GAP SERPL CALCULATED.3IONS-SCNC: 16 MMOL/L (ref 3–16)
ANION GAP SERPL CALCULATED.3IONS-SCNC: 16 MMOL/L (ref 3–16)
ANISOCYTOSIS BLD QL SMEAR: ABNORMAL
APTT BLD: 36.6 SEC (ref 22.1–36.4)
AST SERPL-CCNC: 28 U/L (ref 15–37)
AST SERPL-CCNC: 35 U/L (ref 15–37)
BASOPHILS # BLD: 0 K/UL (ref 0–0.2)
BASOPHILS # BLD: 0.1 K/UL (ref 0–0.2)
BASOPHILS NFR BLD: 0.7 %
BASOPHILS NFR BLD: 1 %
BILIRUB SERPL-MCNC: 0.3 MG/DL (ref 0–1)
BILIRUB SERPL-MCNC: 0.3 MG/DL (ref 0–1)
BUN SERPL-MCNC: 47 MG/DL (ref 7–20)
BUN SERPL-MCNC: 56 MG/DL (ref 7–20)
CALCIUM SERPL-MCNC: 8.5 MG/DL (ref 8.3–10.6)
CALCIUM SERPL-MCNC: 8.6 MG/DL (ref 8.3–10.6)
CHLORIDE SERPL-SCNC: 88 MMOL/L (ref 99–110)
CHLORIDE SERPL-SCNC: 89 MMOL/L (ref 99–110)
CO2 SERPL-SCNC: 28 MMOL/L (ref 21–32)
CO2 SERPL-SCNC: 29 MMOL/L (ref 21–32)
CREAT SERPL-MCNC: 5.3 MG/DL (ref 0.9–1.3)
CREAT SERPL-MCNC: 6.1 MG/DL (ref 0.9–1.3)
DEPRECATED RDW RBC AUTO: 19.3 % (ref 12.4–15.4)
DEPRECATED RDW RBC AUTO: 19.5 % (ref 12.4–15.4)
EKG DIAGNOSIS: NORMAL
EKG Q-T INTERVAL: 336 MS
EKG QRS DURATION: 94 MS
EKG QTC CALCULATION (BAZETT): 478 MS
EKG R AXIS: 83 DEGREES
EKG T AXIS: -6 DEGREES
EKG VENTRICULAR RATE: 122 BPM
EOSINOPHIL # BLD: 0 K/UL (ref 0–0.6)
EOSINOPHIL # BLD: 0 K/UL (ref 0–0.6)
EOSINOPHIL NFR BLD: 0 %
EOSINOPHIL NFR BLD: 0.3 %
FLUAV RNA UPPER RESP QL NAA+PROBE: NEGATIVE
FLUBV AG NPH QL: NEGATIVE
GFR SERPLBLD CREATININE-BSD FMLA CKD-EPI: 10 ML/MIN/{1.73_M2}
GFR SERPLBLD CREATININE-BSD FMLA CKD-EPI: 12 ML/MIN/{1.73_M2}
GLUCOSE BLD-MCNC: 216 MG/DL (ref 70–99)
GLUCOSE BLD-MCNC: 225 MG/DL (ref 70–99)
GLUCOSE BLD-MCNC: 286 MG/DL (ref 70–99)
GLUCOSE SERPL-MCNC: 213 MG/DL (ref 70–99)
GLUCOSE SERPL-MCNC: 216 MG/DL (ref 70–99)
HCT VFR BLD AUTO: 31.5 % (ref 40.5–52.5)
HCT VFR BLD AUTO: 31.7 % (ref 40.5–52.5)
HGB BLD-MCNC: 10.1 G/DL (ref 13.5–17.5)
HGB BLD-MCNC: 10.1 G/DL (ref 13.5–17.5)
HYPOCHROMIA BLD QL SMEAR: ABNORMAL
LYMPHOCYTES # BLD: 0.2 K/UL (ref 1–5.1)
LYMPHOCYTES # BLD: 0.3 K/UL (ref 1–5.1)
LYMPHOCYTES NFR BLD: 2 %
LYMPHOCYTES NFR BLD: 4.4 %
MCH RBC QN AUTO: 27.1 PG (ref 26–34)
MCH RBC QN AUTO: 27.4 PG (ref 26–34)
MCHC RBC AUTO-ENTMCNC: 31.9 G/DL (ref 31–36)
MCHC RBC AUTO-ENTMCNC: 32 G/DL (ref 31–36)
MCV RBC AUTO: 84.8 FL (ref 80–100)
MCV RBC AUTO: 85.5 FL (ref 80–100)
MONOCYTES # BLD: 0.8 K/UL (ref 0–1.3)
MONOCYTES # BLD: 1.3 K/UL (ref 0–1.3)
MONOCYTES NFR BLD: 10.4 %
MONOCYTES NFR BLD: 13 %
NEUTROPHILS # BLD: 6.4 K/UL (ref 1.7–7.7)
NEUTROPHILS # BLD: 8.2 K/UL (ref 1.7–7.7)
NEUTROPHILS NFR BLD: 82 %
NEUTROPHILS NFR BLD: 84.2 %
NEUTS BAND NFR BLD MANUAL: 2 % (ref 0–7)
NT-PROBNP SERPL-MCNC: ABNORMAL PG/ML (ref 0–124)
OVALOCYTES BLD QL SMEAR: ABNORMAL
PATH INTERP BLD-IMP: YES
PERFORMED ON: ABNORMAL
PLATELET # BLD AUTO: 172 K/UL (ref 135–450)
PLATELET # BLD AUTO: 192 K/UL (ref 135–450)
PLATELET BLD QL SMEAR: ADEQUATE
PMV BLD AUTO: 8.7 FL (ref 5–10.5)
PMV BLD AUTO: 9.2 FL (ref 5–10.5)
POIKILOCYTOSIS BLD QL SMEAR: ABNORMAL
POTASSIUM SERPL-SCNC: 4.7 MMOL/L (ref 3.5–5.1)
POTASSIUM SERPL-SCNC: 5 MMOL/L (ref 3.5–5.1)
PROT SERPL-MCNC: 6.5 G/DL (ref 6.4–8.2)
PROT SERPL-MCNC: 6.9 G/DL (ref 6.4–8.2)
RBC # BLD AUTO: 3.68 M/UL (ref 4.2–5.9)
RBC # BLD AUTO: 3.74 M/UL (ref 4.2–5.9)
RSV AG NOSE QL: NEGATIVE
SARS-COV-2 RDRP RESP QL NAA+PROBE: NOT DETECTED
SLIDE REVIEW: ABNORMAL
SODIUM SERPL-SCNC: 133 MMOL/L (ref 136–145)
SODIUM SERPL-SCNC: 133 MMOL/L (ref 136–145)
TROPONIN, HIGH SENSITIVITY: 242 NG/L (ref 0–22)
TROPONIN, HIGH SENSITIVITY: 258 NG/L (ref 0–22)
TROPONIN, HIGH SENSITIVITY: 260 NG/L (ref 0–22)
TROPONIN, HIGH SENSITIVITY: 326 NG/L (ref 0–22)
VARIANT LYMPHS NFR BLD MANUAL: 0 % (ref 0–6)
WBC # BLD AUTO: 7.5 K/UL (ref 4–11)
WBC # BLD AUTO: 9.8 K/UL (ref 4–11)

## 2025-01-07 PROCEDURE — 94761 N-INVAS EAR/PLS OXIMETRY MLT: CPT

## 2025-01-07 PROCEDURE — 2060000000 HC ICU INTERMEDIATE R&B

## 2025-01-07 PROCEDURE — 6370000000 HC RX 637 (ALT 250 FOR IP): Performed by: STUDENT IN AN ORGANIZED HEALTH CARE EDUCATION/TRAINING PROGRAM

## 2025-01-07 PROCEDURE — 2500000003 HC RX 250 WO HCPCS: Performed by: INTERNAL MEDICINE

## 2025-01-07 PROCEDURE — 6370000000 HC RX 637 (ALT 250 FOR IP): Performed by: INTERNAL MEDICINE

## 2025-01-07 PROCEDURE — 85025 COMPLETE CBC W/AUTO DIFF WBC: CPT

## 2025-01-07 PROCEDURE — 2580000003 HC RX 258: Performed by: STUDENT IN AN ORGANIZED HEALTH CARE EDUCATION/TRAINING PROGRAM

## 2025-01-07 PROCEDURE — 2580000003 HC RX 258: Performed by: INTERNAL MEDICINE

## 2025-01-07 PROCEDURE — 96375 TX/PRO/DX INJ NEW DRUG ADDON: CPT

## 2025-01-07 PROCEDURE — 6360000002 HC RX W HCPCS: Performed by: EMERGENCY MEDICINE

## 2025-01-07 PROCEDURE — 6370000000 HC RX 637 (ALT 250 FOR IP): Performed by: EMERGENCY MEDICINE

## 2025-01-07 PROCEDURE — 80053 COMPREHEN METABOLIC PANEL: CPT

## 2025-01-07 PROCEDURE — 96366 THER/PROPH/DIAG IV INF ADDON: CPT

## 2025-01-07 PROCEDURE — 84484 ASSAY OF TROPONIN QUANT: CPT

## 2025-01-07 PROCEDURE — 2700000000 HC OXYGEN THERAPY PER DAY

## 2025-01-07 PROCEDURE — 99222 1ST HOSP IP/OBS MODERATE 55: CPT | Performed by: INTERNAL MEDICINE

## 2025-01-07 PROCEDURE — 96365 THER/PROPH/DIAG IV INF INIT: CPT

## 2025-01-07 PROCEDURE — 99223 1ST HOSP IP/OBS HIGH 75: CPT | Performed by: INTERNAL MEDICINE

## 2025-01-07 PROCEDURE — 93010 ELECTROCARDIOGRAM REPORT: CPT | Performed by: INTERNAL MEDICINE

## 2025-01-07 PROCEDURE — 2500000003 HC RX 250 WO HCPCS: Performed by: STUDENT IN AN ORGANIZED HEALTH CARE EDUCATION/TRAINING PROGRAM

## 2025-01-07 PROCEDURE — 36415 COLL VENOUS BLD VENIPUNCTURE: CPT

## 2025-01-07 PROCEDURE — 85730 THROMBOPLASTIN TIME PARTIAL: CPT

## 2025-01-07 RX ORDER — CALCIUM ACETATE 667 MG/1
2 CAPSULE ORAL 3 TIMES DAILY
Status: DISCONTINUED | OUTPATIENT
Start: 2025-01-07 | End: 2025-01-16

## 2025-01-07 RX ORDER — ACETAMINOPHEN 650 MG/1
650 SUPPOSITORY RECTAL EVERY 6 HOURS PRN
Status: DISCONTINUED | OUTPATIENT
Start: 2025-01-07 | End: 2025-01-16

## 2025-01-07 RX ORDER — PANTOPRAZOLE SODIUM 40 MG/1
40 TABLET, DELAYED RELEASE ORAL DAILY
Status: DISCONTINUED | OUTPATIENT
Start: 2025-01-07 | End: 2025-01-16

## 2025-01-07 RX ORDER — CLOPIDOGREL BISULFATE 75 MG/1
75 TABLET ORAL DAILY
Status: DISCONTINUED | OUTPATIENT
Start: 2025-01-07 | End: 2025-01-07 | Stop reason: HOSPADM

## 2025-01-07 RX ORDER — ASPIRIN 81 MG/1
81 TABLET, CHEWABLE ORAL DAILY
Status: DISCONTINUED | OUTPATIENT
Start: 2025-01-08 | End: 2025-01-10

## 2025-01-07 RX ORDER — HEPARIN SODIUM 1000 [USP'U]/ML
4000 INJECTION, SOLUTION INTRAVENOUS; SUBCUTANEOUS ONCE
Status: COMPLETED | OUTPATIENT
Start: 2025-01-07 | End: 2025-01-07

## 2025-01-07 RX ORDER — HEPARIN SODIUM 1000 [USP'U]/ML
2000 INJECTION, SOLUTION INTRAVENOUS; SUBCUTANEOUS PRN
Status: DISCONTINUED | OUTPATIENT
Start: 2025-01-07 | End: 2025-01-07 | Stop reason: HOSPADM

## 2025-01-07 RX ORDER — IPRATROPIUM BROMIDE AND ALBUTEROL SULFATE 2.5; .5 MG/3ML; MG/3ML
1 SOLUTION RESPIRATORY (INHALATION) ONCE
Status: COMPLETED | OUTPATIENT
Start: 2025-01-07 | End: 2025-01-07

## 2025-01-07 RX ORDER — GLUCAGON 1 MG/ML
1 KIT INJECTION PRN
Status: DISCONTINUED | OUTPATIENT
Start: 2025-01-07 | End: 2025-01-16

## 2025-01-07 RX ORDER — DULOXETIN HYDROCHLORIDE 60 MG/1
60 CAPSULE, DELAYED RELEASE ORAL DAILY
Status: DISCONTINUED | OUTPATIENT
Start: 2025-01-07 | End: 2025-01-16

## 2025-01-07 RX ORDER — SODIUM CHLORIDE 0.9 % (FLUSH) 0.9 %
5-40 SYRINGE (ML) INJECTION PRN
Status: DISCONTINUED | OUTPATIENT
Start: 2025-01-07 | End: 2025-01-16 | Stop reason: HOSPADM

## 2025-01-07 RX ORDER — HEPARIN SODIUM 10000 [USP'U]/100ML
10 INJECTION, SOLUTION INTRAVENOUS CONTINUOUS
Status: DISCONTINUED | OUTPATIENT
Start: 2025-01-07 | End: 2025-01-07 | Stop reason: HOSPADM

## 2025-01-07 RX ORDER — SODIUM CHLORIDE 9 MG/ML
INJECTION, SOLUTION INTRAVENOUS PRN
Status: DISCONTINUED | OUTPATIENT
Start: 2025-01-07 | End: 2025-01-16 | Stop reason: HOSPADM

## 2025-01-07 RX ORDER — INSULIN LISPRO 100 [IU]/ML
0-8 INJECTION, SOLUTION INTRAVENOUS; SUBCUTANEOUS
Status: DISCONTINUED | OUTPATIENT
Start: 2025-01-07 | End: 2025-01-16

## 2025-01-07 RX ORDER — MIDODRINE HYDROCHLORIDE 5 MG/1
10 TABLET ORAL DAILY PRN
Status: DISCONTINUED | OUTPATIENT
Start: 2025-01-07 | End: 2025-01-16

## 2025-01-07 RX ORDER — DILTIAZEM HYDROCHLORIDE 5 MG/ML
10 INJECTION INTRAVENOUS ONCE
Status: COMPLETED | OUTPATIENT
Start: 2025-01-07 | End: 2025-01-07

## 2025-01-07 RX ORDER — PREGABALIN 25 MG/1
25 CAPSULE ORAL DAILY
COMMUNITY

## 2025-01-07 RX ORDER — DEXTROSE MONOHYDRATE 100 MG/ML
INJECTION, SOLUTION INTRAVENOUS CONTINUOUS PRN
Status: DISCONTINUED | OUTPATIENT
Start: 2025-01-07 | End: 2025-01-16

## 2025-01-07 RX ORDER — HEPARIN SODIUM 1000 [USP'U]/ML
4000 INJECTION, SOLUTION INTRAVENOUS; SUBCUTANEOUS PRN
Status: DISCONTINUED | OUTPATIENT
Start: 2025-01-07 | End: 2025-01-07 | Stop reason: HOSPADM

## 2025-01-07 RX ORDER — PREGABALIN 25 MG/1
25 CAPSULE ORAL DAILY
Status: DISCONTINUED | OUTPATIENT
Start: 2025-01-07 | End: 2025-01-16

## 2025-01-07 RX ORDER — PROCHLORPERAZINE EDISYLATE 5 MG/ML
10 INJECTION INTRAMUSCULAR; INTRAVENOUS EVERY 6 HOURS PRN
Status: DISCONTINUED | OUTPATIENT
Start: 2025-01-07 | End: 2025-01-16 | Stop reason: HOSPADM

## 2025-01-07 RX ORDER — INSULIN GLARGINE 100 [IU]/ML
15 INJECTION, SOLUTION SUBCUTANEOUS NIGHTLY
Status: DISCONTINUED | OUTPATIENT
Start: 2025-01-07 | End: 2025-01-11

## 2025-01-07 RX ORDER — IPRATROPIUM BROMIDE AND ALBUTEROL SULFATE 2.5; .5 MG/3ML; MG/3ML
1 SOLUTION RESPIRATORY (INHALATION) EVERY 6 HOURS PRN
Status: DISCONTINUED | OUTPATIENT
Start: 2025-01-07 | End: 2025-01-16 | Stop reason: HOSPADM

## 2025-01-07 RX ORDER — ASPIRIN 81 MG/1
324 TABLET, CHEWABLE ORAL ONCE
Status: COMPLETED | OUTPATIENT
Start: 2025-01-07 | End: 2025-01-07

## 2025-01-07 RX ORDER — SODIUM CHLORIDE 0.9 % (FLUSH) 0.9 %
5-40 SYRINGE (ML) INJECTION EVERY 12 HOURS SCHEDULED
Status: DISCONTINUED | OUTPATIENT
Start: 2025-01-07 | End: 2025-01-16 | Stop reason: HOSPADM

## 2025-01-07 RX ORDER — ATORVASTATIN CALCIUM 40 MG/1
40 TABLET, FILM COATED ORAL DAILY
Status: DISCONTINUED | OUTPATIENT
Start: 2025-01-07 | End: 2025-01-07 | Stop reason: HOSPADM

## 2025-01-07 RX ORDER — OXYCODONE HYDROCHLORIDE 5 MG/1
10 TABLET ORAL EVERY 6 HOURS PRN
Status: DISCONTINUED | OUTPATIENT
Start: 2025-01-07 | End: 2025-01-13

## 2025-01-07 RX ORDER — ACETAMINOPHEN 325 MG/1
650 TABLET ORAL EVERY 6 HOURS PRN
Status: DISCONTINUED | OUTPATIENT
Start: 2025-01-07 | End: 2025-01-16

## 2025-01-07 RX ORDER — ATORVASTATIN CALCIUM 80 MG/1
80 TABLET, FILM COATED ORAL DAILY
Status: DISCONTINUED | OUTPATIENT
Start: 2025-01-07 | End: 2025-01-16

## 2025-01-07 RX ORDER — POLYETHYLENE GLYCOL 3350 17 G/17G
17 POWDER, FOR SOLUTION ORAL DAILY PRN
Status: DISCONTINUED | OUTPATIENT
Start: 2025-01-07 | End: 2025-01-16

## 2025-01-07 RX ORDER — BUSPIRONE HYDROCHLORIDE 5 MG/1
10 TABLET ORAL 3 TIMES DAILY
Status: DISCONTINUED | OUTPATIENT
Start: 2025-01-07 | End: 2025-01-16

## 2025-01-07 RX ORDER — ISOSORBIDE MONONITRATE 30 MG/1
30 TABLET, EXTENDED RELEASE ORAL DAILY
Status: DISCONTINUED | OUTPATIENT
Start: 2025-01-07 | End: 2025-01-16

## 2025-01-07 RX ORDER — CLOPIDOGREL BISULFATE 75 MG/1
75 TABLET ORAL DAILY
Status: DISCONTINUED | OUTPATIENT
Start: 2025-01-07 | End: 2025-01-16

## 2025-01-07 RX ORDER — METOPROLOL SUCCINATE 25 MG/1
25 TABLET, EXTENDED RELEASE ORAL DAILY
Status: DISCONTINUED | OUTPATIENT
Start: 2025-01-07 | End: 2025-01-08

## 2025-01-07 RX ADMIN — BUSPIRONE HYDROCHLORIDE 10 MG: 5 TABLET ORAL at 16:26

## 2025-01-07 RX ADMIN — HEPARIN SODIUM 10 UNITS/KG/HR: 10000 INJECTION, SOLUTION INTRAVENOUS at 10:24

## 2025-01-07 RX ADMIN — SODIUM CHLORIDE, PRESERVATIVE FREE 10 ML: 5 INJECTION INTRAVENOUS at 20:57

## 2025-01-07 RX ADMIN — BUSPIRONE HYDROCHLORIDE 10 MG: 5 TABLET ORAL at 20:56

## 2025-01-07 RX ADMIN — INSULIN GLARGINE 15 UNITS: 100 INJECTION, SOLUTION SUBCUTANEOUS at 21:00

## 2025-01-07 RX ADMIN — DULOXETINE HYDROCHLORIDE 60 MG: 60 CAPSULE, DELAYED RELEASE ORAL at 16:24

## 2025-01-07 RX ADMIN — PANTOPRAZOLE SODIUM 40 MG: 40 TABLET, DELAYED RELEASE ORAL at 16:24

## 2025-01-07 RX ADMIN — DILTIAZEM HYDROCHLORIDE 5 MG/HR: 5 INJECTION, SOLUTION INTRAVENOUS at 01:03

## 2025-01-07 RX ADMIN — SODIUM CHLORIDE 5 MG/HR: 900 INJECTION, SOLUTION INTRAVENOUS at 14:03

## 2025-01-07 RX ADMIN — CLOPIDOGREL BISULFATE 75 MG: 75 TABLET ORAL at 16:24

## 2025-01-07 RX ADMIN — INSULIN LISPRO 4 UNITS: 100 INJECTION, SOLUTION INTRAVENOUS; SUBCUTANEOUS at 17:09

## 2025-01-07 RX ADMIN — CALCIUM ACETATE 1334 MG: 667 CAPSULE ORAL at 20:56

## 2025-01-07 RX ADMIN — IPRATROPIUM BROMIDE AND ALBUTEROL SULFATE 1 DOSE: 2.5; .5 SOLUTION RESPIRATORY (INHALATION) at 00:36

## 2025-01-07 RX ADMIN — HEPARIN SODIUM 4000 UNITS: 1000 INJECTION INTRAVENOUS; SUBCUTANEOUS at 10:22

## 2025-01-07 RX ADMIN — METOPROLOL SUCCINATE 25 MG: 25 TABLET, FILM COATED, EXTENDED RELEASE ORAL at 16:24

## 2025-01-07 RX ADMIN — PREGABALIN 25 MG: 25 CAPSULE ORAL at 16:24

## 2025-01-07 RX ADMIN — ISOSORBIDE MONONITRATE 30 MG: 30 TABLET, EXTENDED RELEASE ORAL at 16:24

## 2025-01-07 RX ADMIN — APIXABAN 5 MG: 5 TABLET, FILM COATED ORAL at 20:56

## 2025-01-07 RX ADMIN — CALCIUM ACETATE 1334 MG: 667 CAPSULE ORAL at 16:24

## 2025-01-07 RX ADMIN — ASPIRIN 324 MG: 81 TABLET, CHEWABLE ORAL at 10:19

## 2025-01-07 RX ADMIN — OXYCODONE 10 MG: 5 TABLET ORAL at 16:24

## 2025-01-07 RX ADMIN — INSULIN LISPRO 2 UNITS: 100 INJECTION, SOLUTION INTRAVENOUS; SUBCUTANEOUS at 21:00

## 2025-01-07 RX ADMIN — DILTIAZEM HYDROCHLORIDE 10 MG: 5 INJECTION, SOLUTION INTRAVENOUS at 00:59

## 2025-01-07 RX ADMIN — ATORVASTATIN CALCIUM 80 MG: 80 TABLET, FILM COATED ORAL at 16:26

## 2025-01-07 ASSESSMENT — PAIN DESCRIPTION - DESCRIPTORS: DESCRIPTORS: ACHING

## 2025-01-07 ASSESSMENT — PAIN DESCRIPTION - ORIENTATION: ORIENTATION: MID;LOWER

## 2025-01-07 ASSESSMENT — PAIN SCALES - GENERAL: PAINLEVEL_OUTOF10: 6

## 2025-01-07 ASSESSMENT — PAIN - FUNCTIONAL ASSESSMENT
PAIN_FUNCTIONAL_ASSESSMENT: ACTIVITIES ARE NOT PREVENTED
PAIN_FUNCTIONAL_ASSESSMENT: NONE - DENIES PAIN
PAIN_FUNCTIONAL_ASSESSMENT: NONE - DENIES PAIN

## 2025-01-07 ASSESSMENT — PAIN DESCRIPTION - ONSET: ONSET: ON-GOING

## 2025-01-07 ASSESSMENT — PAIN DESCRIPTION - FREQUENCY: FREQUENCY: CONTINUOUS

## 2025-01-07 ASSESSMENT — PAIN DESCRIPTION - PAIN TYPE: TYPE: CHRONIC PAIN

## 2025-01-07 ASSESSMENT — PAIN DESCRIPTION - LOCATION: LOCATION: BACK

## 2025-01-07 NOTE — PROGRESS NOTES
Pharmacy to Manage Heparin Infusion per Hospital Nomogram    Dx: elevated troponins  Pt wt = 99.8kg  Baseline aPTT = ordered    Oral factor Xa-inhibitors may alter and elevate anti-Xa levels used for unfractionated heparin monitoring. As a result, anti-Xa monitoring is not accurate while Xa-inhibitor activity is detectable. Utilize aPTT monitoring when patient received an oral factor Xa-inhibitor (apixaban, betrixaban, edoxaban or rivaroxaban) within 72 hours prior to admission (please document last administration time). The goal is to allow a washout of oral factor Xa-inhibitors by using aPTT for 72 hours, then change to ant-Xa levels for UFH.     PT is on Eliquis at home. last dose appears to be the evening of 1/6    Heparin (weight-based) Infusion: CAD/STEMI/NSTEMI/UA/AFib)   Heparin 60 units/kg IVP bolus (max 4,000 units) followed by Heparin infusion at 12 units/kg/hr (recommended max initial rate: 1000 units/hr).  Recheck aPTT  in 6 hours.  Goal aPTT =  seconds.

## 2025-01-07 NOTE — ED NOTES
Pt shouted out \"help\" went in to pt's room pt was attempting to sit up in bed for comfort, assisted with other RN to help pt sit up at side of bed per pt request stating he was more comfortable sitting up like this. Pt has call light in reach re-educated on use and to not stand up or attempt to get out of bed without assistance.

## 2025-01-07 NOTE — CONSULTS
[facility]   CONSULTATION  [phone]      Attending Physician: Emily Velez MD  Reason for Consultation/Chief Complaint: not feeling well    Subjective   History of Present Illness:  Igor Ann is a 54 y.o. patient who presented to the hospital with complaints of not feeling well, patient has a hard time describing this [place], he states he does note shortness of breath, he denies chest pain, he denies palpitations, dizziness or loss of consciousness.  He states he has noted no swelling in his legs.  He states he is able to ambulate despite prior right foot amputation although he goes slowly.  His wife is on the phone and further history is also obtained from her as well as from chart review.  Patient presented to [facility] yesterday and was admitted with atrial fibrillation with rapid ventricular response, cardiac biomarkers were elevated and patient was transferred to [facility] for further evaluation.  Troponin levels were 258 and 326, proBNP level was greater than 70,000.    Patient has a cardiac history which dates back to 2015, had CAD and LAD stenting that time, had follow-up catheterization in 2017 which showed patent stent, has had significant peripheral vascular disease with right iliac PTA/stenting, and ultimately in 2019 he underwent TAVR for bicuspid arctic valve with severe aortic stenosis.  Last catheterization was in 2023, at that time he underwent multivessel stenting of the ramus circumflex and RCA.  He has had numerous hospitalizations in 2023-24 for heart failure.  Of note, it was noted in these admissions that he has intermittently missed dialysis sessions and has had hypertensive emergency.  He does have poor functional status, is often gotten around with a wheelchair although occasionally he can also walk around independently.  He says typically he is on 4 L nasal cannula oxygen.  Previous weights have been between 188 to 194 pounds, current  Value Date/Time     01/07/2025 08:42 AM    K 5.0 01/07/2025 08:42 AM    CL 89 01/07/2025 08:42 AM    CO2 28 01/07/2025 08:42 AM    BUN 56 01/07/2025 08:42 AM    CREATININE 6.1 01/07/2025 08:42 AM    GFRAA 10 10/05/2022 11:53 AM    GFRAA >60 05/17/2013 06:50 AM    AGRATIO 1.0 01/07/2025 08:42 AM    LABGLOM 10 01/07/2025 08:42 AM    LABGLOM 9 04/10/2024 09:25 AM    LABGLOM 12 01/25/2024 12:00 AM    GLUCOSE 216 01/07/2025 08:42 AM    CALCIUM 8.5 01/07/2025 08:42 AM    BILITOT 0.3 01/07/2025 08:42 AM    ALKPHOS 130 01/07/2025 08:42 AM    AST 35 01/07/2025 08:42 AM    ALT 19 01/07/2025 08:42 AM     PT/INR:  No results found for: \"PTINR\"  HgBA1c:  Lab Results   Component Value Date    LABA1C 6.0 07/17/2024     Lab Results   Component Value Date    CKTOTAL 45 05/25/2022    TROPONINI 0.11 (H) 04/18/2023         Cardiac Data     Last EKG:   afib with rvr  nst changes     Expand All Collapse All  Cardiac Data      Last EKG: Normal sinus rhythm, right axis, IVCD, nonspecific ST/T changes, prior EKG from May 2024 had normal axis, poor R wave progression     Echo:     7/2024         Left Ventricle: Mildly reduced left ventricular systolic function with a visually estimated EF of 45 - 50%. Moderate hypokinesis of the following segments: basal anterior and mid anterior.    Right Atrium: Question calcified atrial structure vs mass noted in the right atrium. This was visualized in previous echo on 4/1/24.This is consistent with his dialysis catheter,    Pericardium: Small (<1 cm) pericardial effusion present.    Limited echocardiogram.    Image quality is technically difficult. Technically difficult study and technically difficult study due to patient's body habitus.     Stress Test:     7/2024       Stress Combined Conclusion: This is an abnormal pharmacological myocardial perfusion study. Findings suggest a high risk of cardiac events.    Perfusion Comments: LV perfusion is abnormal.    Perfusion Defect: There are multiple

## 2025-01-07 NOTE — RT PROTOCOL NOTE
RT Inhaler-Nebulizer Bronchodilator Protocol Note    There is a bronchodilator order in the chart from a provider indicating to follow the RT Bronchodilator Protocol and there is an “Initiate RT Inhaler-Nebulizer Bronchodilator Protocol” order as well (see protocol at bottom of note).    CXR Findings:  XR CHEST PORTABLE    Result Date: 1/7/2025  Cardiomegaly with pulmonary edema and bilateral pleural effusions similar to 12/27/2024. Status post TAVR. Tunneled dialysis catheter remains in good position.       The findings from the last RT Protocol Assessment were as follows:   History Pulmonary Disease: Chronic pulmonary disease  Respiratory Pattern: Regular pattern and RR 12-20 bpm  Breath Sounds: Slightly diminished and/or crackles  Cough: Strong, spontaneous, non-productive  Indication for Bronchodilator Therapy: On home bronchodilators  Bronchodilator Assessment Score: 4    Aerosolized bronchodilator medication orders have been revised according to the RT Inhaler-Nebulizer Bronchodilator Protocol below.    Respiratory Therapist to perform RT Therapy Protocol Assessment initially then follow the protocol.  Repeat RT Therapy Protocol Assessment PRN for score 0-3 or on second treatment, BID, and PRN for scores above 3.    No Indications - adjust the frequency to every 6 hours PRN wheezing or bronchospasm, if no treatments needed after 48 hours then discontinue using Per Protocol order mode.     If indication present, adjust the RT bronchodilator orders based on the Bronchodilator Assessment Score as indicated below.  Use Inhaler orders unless patient has one or more of the following: on home nebulizer, not able to hold breath for 10 seconds, is not alert and oriented, cannot activate and use MDI correctly, or respiratory rate 25 breaths per minute or more, then use the equivalent nebulizer order(s) with same Frequency and PRN reasons based on the score.  If a patient is on this medication at home then do not

## 2025-01-07 NOTE — ED NOTES
MD at bedside pt called out for help started coughing and feels like he can't stop and increased shortness of breath, o2 sats still 95-96% on pt's 4L NC o2 but HR more consistently in 120s-140s.

## 2025-01-07 NOTE — ED NOTES
MD soliman with current V/S and pt remaining on Cardizem drip at 7.5mg/hr without increasing at this time d/t risk for hypotension.    Update provided to Johnny DEAL

## 2025-01-07 NOTE — ED NOTES
Pt BP has been stabilizing/steady with heart rate still hovering around 110-120, discussed with MD to bring pt up to 7.5mg/hr but to tread slowly as to not bring down his blood pressure too much/quickly. Will continue to monitor HR and BP while pt is on drip.

## 2025-01-07 NOTE — PROGRESS NOTES
Home medication list completed by [facility] via [facility].     Of note, nursing home thought hypotension was caused to recent medication changed, but pharmacy found no evidence of medication changes in the past week.     Asia Combs, PharmD, [facility], 1/7/2025 1:20 AM

## 2025-01-07 NOTE — ED PROVIDER NOTES
Emergency Department Encounter    Patient: Igor Ann  MRN: [id number]  : 1970  Date of Evaluation: 2025  ED Provider:  Rene Chisholm MD    Triage Chief Complaint:   Hypotension (Pt to ED sent in by EMS from [facility] for hypotension of 80s/60s, Chest port for dialysis M/W/F and Fistula to left arm, Hx AFIB, \"I just don't feel good\" but unable to provide specifics, denies pain or shortness of breath or nausea. AAOx3 to self situation and location, reoriented to date. NAD, resp e/u on 4L NC o2 pt baseline oxygen. Bed locked lowest position, rails up x2, call light within reach. Pt placed on BP cuff Pulse ox and Cardiac monitor.)    [place]:  Igor Ann is a 54 y.o. male that presents ***    ROS - see HPI, below listed is current ROS at time of my eval:  At least 14 systems reviewed, negative other than HPI  Past Medical History:   Diagnosis Date    Ambulatory dysfunction     walker for long distances, SOB with distance    Aortic stenosis     echo     Arthritis     hands and hips    Asthma     Bilateral hilar adenopathy syndrome 2013    CAD (coronary artery disease)     Dr. Javier [facility]    Cardiomyopathy ([facility]) 2019    EF= 43%    CHF, EF 35-40% (2024)     Chronic pain     COPD (chronic obstructive pulmonary disease) ([facility])     pulmonology Dr. Batista    CVA (cerebral vascular accident) ([facility]) 2017    Depression     Diabetes mellitus ([facility])     borderline    Difficult intravenous access     Emphysema of lung ([facility])     ESRD (end stage renal disease) on dialysis ([facility])     MWF    Fear of needles     Gastric ulcer     GERD (gastroesophageal reflux disease)     HD, T/R/Sa     Heart valve problem     bicuspic valve    History of spinal fracture     work incident    History of transcatheter aortic valve replacement (TAVR)     reported by wife    Hx of blood clots     Bilateral lower extremities; stents in place    Hyperlipidemia     Hypertension     MI (myocardial

## 2025-01-07 NOTE — CARE COORDINATION
Case Management Assessment  Initial Evaluation    Date/Time of Evaluation: 1/7/2025 2:31 PM  Assessment Completed by: Belkis Mcdaniel RN    If patient is discharged prior to next notation, then this note serves as note for discharge by case management.    Patient Name: Igor Ann                   YOB: 1970  Diagnosis: Atrial fibrillation with rapid ventricular response (HCC) [I48.91]                   Date / Time: 1/7/2025 11:11 AM    Patient Admission Status: Inpatient   Readmission Risk (Low < 19, Mod (19-27), High > 27): Readmission Risk Score: 28    Current PCP: Vonnie Cleveland APRN - NP  PCP verified by CM? Yes (Lilia Edward CNP)    Chart Reviewed: Yes      History Provided by: Patient, Medical Record  Patient Orientation: Alert and Oriented    Patient Cognition: Alert    Hospitalization in the last 30 days (Readmission):  Yes    If yes, Readmission Assessment in CM Navigator will be completed.    Advance Directives:      Code Status: Prior   Patient's Primary Decision Maker is: Patient Declined (Legal Next of Kin Remains as Decision Maker)    Primary Decision Maker: Joya Land - Step Child - [phone]    Secondary Decision Maker: Sridevi Salmeron - Brother/Sister - [phone]    Supplemental (Other) Decision Maker: Brianna Reyes - Step Child - [phone]    Discharge Planning:    Patient lives with: Family Members Type of Home: Long-Term Care ([facility])  Primary Care Giver: Self (Staff at [facility])  Patient Support Systems include: Spouse/Significant Other, Children, Other (Comment) (Staff at [facility])   Current Financial resources: Medicare  Current community resources: ECF/Home Care ([facility])  Current services prior to admission: Durable Medical Equipment, Home Bipap, Oxygen Therapy, Extended Care Facility            Current DME: Walker, Oxygen Therapy (Comment) (Provided by [facility])            Type of Home Care services:  None    ADLS  Prior functional level: Independent in ADLs/IADLs,

## 2025-01-07 NOTE — CONSULTS
AST 35, H/H 10.1/31.5 and Anders 242, 260, 258, 326 (125-126 in 12/24 and 115 in 7/24); flu/Covid negative.   Diagnosis unspecified CP and elevated Anders in middle-aged male with multiple med problems including significant CAD and ICM.    Recs:  Given much increased Anders above baseline Anders elevation from ESRD, type of CP similar to prior angina, and recent PCI with 2 PATRICIA to RCA and borderline significant LAD lesion based on these findings I recommend left heart cath for definitive evaluation of coronary arteries. He needs interventional cardiologist consultation to see if they agree or not.  Would start heparin gtt for AC.   D/C diltiazem gtt given low BP and HR in low 100's.   Received adult aspirin x 1 and restart plavix.   Hold eliquis 5 BID pending possible LHC.  Restart lipitor but increase from 20 to 40 qd.  Hold Toprol XL 25 qd due to lower BP.  Transferring to [facility] this morning.    Note CP and elevated Anders with hx severe CAD increases his morbidity and mortality requiring med tx and possible cath.     Patient Active Problem List   Diagnosis    Sepsis without acute organ dysfunction (HCC)    CHF (congestive heart failure) (HCC)    Chronic obstructive pulmonary disease (HCC)    Coronary artery disease involving native coronary artery of native heart    PVD (peripheral vascular disease) ([facility])    Bicuspid aortic valve    Bilateral hilar adenopathy syndrome    Claudication in peripheral vascular disease (HCC)    Hypertension    Diabetic neuropathy (HCC)    Type 2 DM with hypertension and ESRD on dialysis (HCC)    Passive smoke exposure    Depression with anxiety    Community acquired pneumonia of left lower lobe of lung    Obesity    ZAINAB on CPAP    Degeneration of lumbar or lumbosacral intervertebral disc    Lumbar radiculopathy    Lumbosacral spondylosis without myelopathy    Biliary colic    Symptomatic cholelithiasis    Gastroparesis due to DM (HCC)    Angina, class IV ([facility])    Dyspnea    Dyslipidemia    Chronic

## 2025-01-07 NOTE — ED PROVIDER NOTES
Assumed care of this patient at shift change from Dr. Chisholm with transfer to [facility] pending.  Please refer to his note for full H&P.  In short the patient has a history of medical noncompliance, end-stage renal disease on dialysis every Monday, Wednesday and Friday also with a history of ischemic cardiomyopathy.  He had 2 stents placed in October at [facility].  He has an EF of 35 to 40%.  Arrived complaining of chest pain and pain all over his entire body.  Noted to be in A-fib with RVR.  Troponin elevated above baseline.  Started on a diltiazem drip.  This morning he is rate controlled on the diltiazem drip.   Did discuss with Dr. Garcia this morning since the patient is awaiting a bed at [facility] but they are currently discharge dependent.  He does think the patient should go to a facility with a have interventional card so they can evaluate.  Currently the patient is chest pain-free.  He states he had chest pain when he first came in but has not had chest pain all through the night or this morning.  Troponin rechecked this morning and it is rising.  He remains chest pain-free.  Other lab work stable.  Potassium stable.  He will be due for dialysis tomorrow.  Remains chest pain-free and although his troponin is rising he does have a chronic troponin elevation in the setting of end-stage renal disease.  Suspect that it is demand related to the tachycardia he had through the night.  Currently is rate controlled.  In light of this I will hold on heparinization and await evaluation by interventional cards.  I have given him a full dose aspirin here we were just informed that [facility] does have a bed ready for him so we will proceed with transfer.    Dr. Garcia did come to the ER to evaluate the patient and reviewed his rising troponin does recommend heparinization which has been ordered.  We will also keep him n.p.o.  Expecting transport to arrive from [facility] within the next 20  40.0 - 50.0 mmHg    PO2, Blade 50.0 (H) 25.0 - 40.0 mmHg    HCO3, Venous 26.4 23.0 - 29.0 mmol/L    Base Excess, Blade 0.6 -3.0 - 3.0 mmol/L    O2 Sat, Blade 84 Not Established %    Carboxyhemoglobin 2.6 (H) 0.0 - 1.5 %    MetHgb, Blade 0.3 <1.5 %    TC02 (Calc), Blade 28 Not Established mmol/L    O2 Therapy Unknown    Brain Natriuretic Peptide   Result Value Ref Range    NT Pro-BNP >70,000 (H) 0 - 124 pg/mL   Troponin   Result Value Ref Range    Troponin, High Sensitivity 260 (H) 0 - 22 ng/L   Troponin   Result Value Ref Range    Troponin, High Sensitivity 258 (H) 0 - 22 ng/L   Comprehensive Metabolic Panel w/ Reflex to MG   Result Value Ref Range    Sodium 133 (L) 136 - 145 mmol/L    Potassium reflex Magnesium 5.0 3.5 - 5.1 mmol/L    Chloride 89 (L) 99 - 110 mmol/L    CO2 28 21 - 32 mmol/L    Anion Gap 16 3 - 16    Glucose 216 (H) 70 - 99 mg/dL    BUN 56 (H) 7 - 20 mg/dL    Creatinine 6.1 (HH) 0.9 - 1.3 mg/dL    Est, Glom Filt Rate 10 (A) >60    Calcium 8.5 8.3 - 10.6 mg/dL    Total Protein 6.5 6.4 - 8.2 g/dL    Albumin 3.2 (L) 3.4 - 5.0 g/dL    Albumin/Globulin Ratio 1.0 (L) 1.1 - 2.2    Total Bilirubin 0.3 0.0 - 1.0 mg/dL    Alkaline Phosphatase 130 (H) 40 - 129 U/L    ALT 19 10 - 40 U/L    AST 35 15 - 37 U/L   Troponin   Result Value Ref Range    Troponin, High Sensitivity 326 (H) 0 - 22 ng/L   CBC with Auto Differential   Result Value Ref Range    WBC 9.8 4.0 - 11.0 K/uL    RBC 3.68 (L) 4.20 - 5.90 M/uL    Hemoglobin 10.1 (L) 13.5 - 17.5 g/dL    Hematocrit 31.5 (L) 40.5 - 52.5 %    MCV 85.5 80.0 - 100.0 fL    MCH 27.4 26.0 - 34.0 pg    MCHC 32.0 31.0 - 36.0 g/dL    RDW 19.5 (H) 12.4 - 15.4 %    Platelets 192 135 - 450 K/uL    MPV 9.2 5.0 - 10.5 fL    PLATELET SLIDE REVIEW Adequate     SLIDE REVIEW see below     Path Consult Yes     Neutrophils % 82.0 %    Lymphocytes % 2.0 %    Monocytes % 13.0 %    Eosinophils % 0.0 %    Basophils % 1.0 %    Neutrophils Absolute 8.2 (H) 1.7 - 7.7 K/uL    Lymphocytes Absolute 0.2 (L)

## 2025-01-07 NOTE — ED NOTES
Reached out to [facility] to see if there are any other options for transfer for this pt to a facility with a Cath Lab. Unfortunately, at this time [facility] are at capacity and [facility] is discharge dependent. Only other option is [facility] or wait for a bed to become available.

## 2025-01-07 NOTE — PROGRESS NOTES
Pt currently only has IV access in on PIV and is an HD no sticks in the left arm. Pharmacy called to see if Dilt and heparin can infuse together while we wait for ICU to send an ultrasound stick nurse to the floor. Currently calling [facility] to see if another nurse is able to come to bedside and do ultrasound.

## 2025-01-07 NOTE — ED NOTES
ED provider, Dr. Witt reports OK to start heparin gtt and administer bolus prior to APTT results.

## 2025-01-07 NOTE — ED NOTES
Reached out to [facility]. Pt is scheduled for p/u at 1030, but is starting a Heparin gtt per Dr. Garcia. Spoke with Tess at [facility] and advised her of this change, she states she will update the crew. Okay to proceed with p/u.

## 2025-01-07 NOTE — ED NOTES
Pt shouting out again for help, still not using call light, pt assisted back to laying in bed on left side above 30 degrees, side rails up x3, pt [id number] on using call light instead of shouting for help for pt safety. Pt more comfortable at this time, denies any other needs or complaints at this time.

## 2025-01-07 NOTE — ED NOTES
0859 - Called for consult, Dr. Garcia is on call at this time.     0903 - Dr. Garcia returned call, call transferred to Dr. Witt.

## 2025-01-08 ENCOUNTER — APPOINTMENT (OUTPATIENT)
Age: 55
End: 2025-01-08
Attending: INTERNAL MEDICINE
Payer: MEDICARE

## 2025-01-08 ENCOUNTER — APPOINTMENT (OUTPATIENT)
Dept: GENERAL RADIOLOGY | Age: 55
End: 2025-01-08
Attending: INTERNAL MEDICINE
Payer: MEDICARE

## 2025-01-08 ENCOUNTER — CLINICAL DOCUMENTATION (OUTPATIENT)
Age: 55
End: 2025-01-08

## 2025-01-08 LAB
GLUCOSE BLD-MCNC: 166 MG/DL (ref 70–99)
GLUCOSE BLD-MCNC: 181 MG/DL (ref 70–99)
GLUCOSE BLD-MCNC: 209 MG/DL (ref 70–99)
GLUCOSE BLD-MCNC: 214 MG/DL (ref 70–99)
GLUCOSE BLD-MCNC: 225 MG/DL (ref 70–99)
GLUCOSE BLD-MCNC: 262 MG/DL (ref 70–99)
PATH INTERP BLD-IMP: NORMAL
PATH INTERP BLD-IMP: NORMAL
PERFORMED ON: ABNORMAL

## 2025-01-08 PROCEDURE — 2580000003 HC RX 258: Performed by: INTERNAL MEDICINE

## 2025-01-08 PROCEDURE — 6370000000 HC RX 637 (ALT 250 FOR IP): Performed by: INTERNAL MEDICINE

## 2025-01-08 PROCEDURE — 2700000000 HC OXYGEN THERAPY PER DAY

## 2025-01-08 PROCEDURE — 2060000000 HC ICU INTERMEDIATE R&B

## 2025-01-08 PROCEDURE — 6360000002 HC RX W HCPCS: Performed by: INTERNAL MEDICINE

## 2025-01-08 PROCEDURE — 2500000003 HC RX 250 WO HCPCS: Performed by: INTERNAL MEDICINE

## 2025-01-08 PROCEDURE — 73630 X-RAY EXAM OF FOOT: CPT

## 2025-01-08 PROCEDURE — 94761 N-INVAS EAR/PLS OXIMETRY MLT: CPT

## 2025-01-08 PROCEDURE — 99233 SBSQ HOSP IP/OBS HIGH 50: CPT | Performed by: INTERNAL MEDICINE

## 2025-01-08 PROCEDURE — 73620 X-RAY EXAM OF FOOT: CPT

## 2025-01-08 RX ORDER — 0.9 % SODIUM CHLORIDE 0.9 %
250 INTRAVENOUS SOLUTION INTRAVENOUS ONCE
Status: COMPLETED | OUTPATIENT
Start: 2025-01-08 | End: 2025-01-08

## 2025-01-08 RX ORDER — DILTIAZEM HCL 60 MG
30 TABLET ORAL EVERY 6 HOURS SCHEDULED
Status: DISCONTINUED | OUTPATIENT
Start: 2025-01-08 | End: 2025-01-08

## 2025-01-08 RX ORDER — DILTIAZEM HCL 60 MG
30 TABLET ORAL EVERY 6 HOURS SCHEDULED
Status: DISCONTINUED | OUTPATIENT
Start: 2025-01-08 | End: 2025-01-09

## 2025-01-08 RX ORDER — REGADENOSON 0.08 MG/ML
0.4 INJECTION, SOLUTION INTRAVENOUS
Status: COMPLETED | OUTPATIENT
Start: 2025-01-08 | End: 2025-01-10

## 2025-01-08 RX ORDER — METOPROLOL SUCCINATE 25 MG/1
12.5 TABLET, EXTENDED RELEASE ORAL DAILY
Status: DISCONTINUED | OUTPATIENT
Start: 2025-01-09 | End: 2025-01-10

## 2025-01-08 RX ADMIN — ISOSORBIDE MONONITRATE 30 MG: 30 TABLET, EXTENDED RELEASE ORAL at 09:26

## 2025-01-08 RX ADMIN — ASPIRIN 81 MG: 81 TABLET, CHEWABLE ORAL at 09:26

## 2025-01-08 RX ADMIN — BUSPIRONE HYDROCHLORIDE 10 MG: 5 TABLET ORAL at 14:03

## 2025-01-08 RX ADMIN — PREGABALIN 25 MG: 25 CAPSULE ORAL at 09:26

## 2025-01-08 RX ADMIN — BUSPIRONE HYDROCHLORIDE 10 MG: 5 TABLET ORAL at 21:38

## 2025-01-08 RX ADMIN — CLOPIDOGREL BISULFATE 75 MG: 75 TABLET ORAL at 09:25

## 2025-01-08 RX ADMIN — SODIUM CHLORIDE, PRESERVATIVE FREE 10 ML: 5 INJECTION INTRAVENOUS at 21:39

## 2025-01-08 RX ADMIN — INSULIN LISPRO 2 UNITS: 100 INJECTION, SOLUTION INTRAVENOUS; SUBCUTANEOUS at 12:17

## 2025-01-08 RX ADMIN — INSULIN LISPRO 2 UNITS: 100 INJECTION, SOLUTION INTRAVENOUS; SUBCUTANEOUS at 21:38

## 2025-01-08 RX ADMIN — CALCIUM ACETATE 1334 MG: 667 CAPSULE ORAL at 21:38

## 2025-01-08 RX ADMIN — DILTIAZEM HYDROCHLORIDE 30 MG: 60 TABLET ORAL at 09:25

## 2025-01-08 RX ADMIN — BUSPIRONE HYDROCHLORIDE 10 MG: 5 TABLET ORAL at 09:25

## 2025-01-08 RX ADMIN — DULOXETINE HYDROCHLORIDE 60 MG: 60 CAPSULE, DELAYED RELEASE ORAL at 09:25

## 2025-01-08 RX ADMIN — OXYCODONE 10 MG: 5 TABLET ORAL at 09:25

## 2025-01-08 RX ADMIN — CALCIUM ACETATE 1334 MG: 667 CAPSULE ORAL at 12:17

## 2025-01-08 RX ADMIN — PROCHLORPERAZINE EDISYLATE 10 MG: 5 INJECTION INTRAMUSCULAR; INTRAVENOUS at 09:25

## 2025-01-08 RX ADMIN — APIXABAN 5 MG: 5 TABLET, FILM COATED ORAL at 09:26

## 2025-01-08 RX ADMIN — APIXABAN 5 MG: 5 TABLET, FILM COATED ORAL at 21:30

## 2025-01-08 RX ADMIN — METOPROLOL SUCCINATE 25 MG: 25 TABLET, FILM COATED, EXTENDED RELEASE ORAL at 09:26

## 2025-01-08 RX ADMIN — PANTOPRAZOLE SODIUM 40 MG: 40 TABLET, DELAYED RELEASE ORAL at 09:25

## 2025-01-08 RX ADMIN — SODIUM CHLORIDE 250 ML: 9 INJECTION, SOLUTION INTRAVENOUS at 16:26

## 2025-01-08 RX ADMIN — INSULIN LISPRO 2 UNITS: 100 INJECTION, SOLUTION INTRAVENOUS; SUBCUTANEOUS at 16:24

## 2025-01-08 RX ADMIN — SODIUM CHLORIDE 7.5 MG/HR: 900 INJECTION, SOLUTION INTRAVENOUS at 02:12

## 2025-01-08 RX ADMIN — SODIUM CHLORIDE, PRESERVATIVE FREE 10 ML: 5 INJECTION INTRAVENOUS at 09:26

## 2025-01-08 RX ADMIN — INSULIN GLARGINE 15 UNITS: 100 INJECTION, SOLUTION SUBCUTANEOUS at 21:30

## 2025-01-08 RX ADMIN — ATORVASTATIN CALCIUM 80 MG: 80 TABLET, FILM COATED ORAL at 09:25

## 2025-01-08 ASSESSMENT — PAIN DESCRIPTION - ORIENTATION: ORIENTATION: MID;LOWER

## 2025-01-08 ASSESSMENT — PAIN DESCRIPTION - LOCATION: LOCATION: BACK

## 2025-01-08 ASSESSMENT — PAIN SCALES - GENERAL
PAINLEVEL_OUTOF10: 8
PAINLEVEL_OUTOF10: 8

## 2025-01-08 NOTE — PROGRESS NOTES
[facility]   Daily Cardiovascular Progress Note    Admit Date: 1/7/2025    Chief complaint: Weakness, not feeling well  HPI:     Pt currently denies CP, SOB, dizziness or syncope.        Medications/Labs all Reviewed:  Patient Active Problem List   Diagnosis    Sepsis without acute organ dysfunction ([facility])    CHF (congestive heart failure) ([facility])    Chronic obstructive pulmonary disease ([facility])    Coronary artery disease involving native coronary artery of native heart    PVD (peripheral vascular disease) ([facility])    Bicuspid aortic valve    Bilateral hilar adenopathy syndrome    Claudication in peripheral vascular disease ([facility])    Hypertension    Diabetic neuropathy ([facility])    Type 2 DM with hypertension and ESRD on dialysis ([facility])    Passive smoke exposure    Depression with anxiety    Community acquired pneumonia of left lower lobe of lung    Obesity    ZAINAB on CPAP    Degeneration of lumbar or lumbosacral intervertebral disc    Lumbar radiculopathy    Lumbosacral spondylosis without myelopathy    Biliary colic    Symptomatic cholelithiasis    Gastroparesis due to DM ([facility])    Elevated troponin    Angina, class IV ([facility])    Dyspnea    Dyslipidemia    Chronic systolic (congestive) heart failure ([facility])    Ischemic cardiomyopathy    Tobacco abuse    CVA (cerebral vascular accident) ([facility])    History of CVA (cerebrovascular accident)    Type 2 diabetes mellitus without complication, without long-term current use of insulin ([facility])    ZAINAB treated with BiPAP    Pleural effusion    Chronic anemia    Nonrheumatic aortic valve stenosis    Mucus plugging of bronchi    Hemodialysis-associated hypotension    ESRD on dialysis ([facility])    Hypotension due to drugs    Acute diastolic CHF (congestive heart failure) ([facility])    Neuromuscular disorder ([facility])    Renovascular hypertension    Mixed hyperlipidemia    Cigarette nicotine dependence in remission    Pulmonary edema    Fluid overload    Anemia of chronic disease    SOB (shortness  NSTEMI (non-ST elevated myocardial infarction) ([facility])    SIRS (systemic inflammatory response syndrome) ([facility])    Atrial flutter ([facility])    [place]-rectal abscess    Somnolence    Pulmonary edema with diastolic CHF, NYHA class 3 ([facility])    Respiratory failure    Former smoker    Cardiomyopathy ([facility])    Morbid obesity with BMI of 40.0-44.9, adult    Hypoglycemia    Altered mental status    Hypertensive emergency    Dyspnea and respiratory abnormalities    Acute respiratory failure with hypoxia and hypercapnia    HFrEF (heart failure with reduced ejection fraction) ([facility])    Pericardial effusion    Hypervolemia    Pneumonia of left lower lobe due to infectious organism    Acute on chronic respiratory failure with hypercapnia    Compression atelectasis    Type 2 myocardial infarction ([facility])    Acute respiratory failure with hypoxemia    Hyperkalemia    Hyponatremia    Metabolic acidemia    Pulmonary infiltrate    Leukocytosis    Hypertensive urgency    Severe sepsis ([facility])    Positive blood culture    Chest heaviness    Abnormal cardiovascular stress test    Ulcer with gangrene, with unspecified severity ([facility])    Symptomatic bradycardia    Pulmonary HTN ([facility])    Aortic valve disease    Cardiogenic shock    Gangrene of right foot ([facility])    Wound infection    Stage 5 chronic kidney disease on chronic dialysis ([facility])    Acute respiratory failure with hypoxia    Volume overload    Sepsis ([facility])    Bimalleolar fracture of right ankle, closed, initial encounter    High anion gap metabolic acidosis    Acute on chronic left systolic heart failure ([facility])    Anxiety    Acute on chronic respiratory failure    Non-compliance    Diabetic ketoacidosis without coma associated with diabetes mellitus due to underlying condition ([facility])    Abnormal nuclear cardiac imaging test    ZAINAB (obstructive sleep apnea)    Acute systolic CHF (congestive heart failure) ([facility])    Foot ulcer with necrosis of bone, left ([facility])    Right foot ulcer, with necrosis of bone

## 2025-01-08 NOTE — CONSULTS
Podiatry Consult Note    History of Present Illness:              The patient is a 54 year old gentleman with DM2 and neuropathy.  He has a history of a transmetatarsal amputation on the right.  We were asked to see him for a wound at the left foot.  When I asked him what was going on with the foot, he said \"you'll see.\"  He denies n/v/f/c and SOB.     Past Medical History:        Diagnosis Date    Ambulatory dysfunction     walker for long distances, SOB with distance    Aortic stenosis     echo 2017    Arthritis     hands and hips    Asthma     Bilateral hilar adenopathy syndrome 06/03/2013    CAD (coronary artery disease)     Dr. Javier Chanel Heart    Cardiomyopathy ([facility]) 04/19/2019    EF= 43%    CHF, EF 35-40% (July 2024)     Chronic pain     COPD (chronic obstructive pulmonary disease) ([facility])     pulmonology Dr. Batista    CVA (cerebral vascular accident) ([facility]) 05/21/2017    Depression     Diabetes mellitus ([facility])     borderline    Difficult intravenous access     Emphysema of lung ([facility])     ESRD (end stage renal disease) on dialysis ([facility])     MWF    Fear of needles     Gastric ulcer     GERD (gastroesophageal reflux disease)     HD, T/R/Sa     Heart valve problem     bicuspic valve    History of spinal fracture     work incident    History of transcatheter aortic valve replacement (TAVR)     reported by wife    Hx of blood clots     Bilateral lower extremities; stents in place    Hyperlipidemia     Hypertension     MI (myocardial infarction) ([facility]) 2019    has had 9 MIs. 2019 was the last    Neuromuscular disorder ([facility])     due to CVA    Numbness and tingling in left arm     from fistula    Pneumonia     PONV (postoperative nausea and vomiting)     Prolonged emergence from general anesthesia     States requires more medication than most people    Sleep apnea     Uses CPAP    Stroke ([facility])     7mm thalamic cva 2017 deficts left side, left side weakness    TIA (transient ischemic attack)     Unspecified diseases  DEBRIDEMENT Right 05/02/2024    RIGHT FOOT DEBRIDEMENT,  INCISION AND DRAINAGE,  BONE RESECTION,  GRAFT APPLICATION performed by [facility] Berg MD [facility]    FOOT SURGERY Right 05/26/2023    RIGHT FIFTH RAY AMPUTATION performed by Cely Rodriguez DPM at [facility] system OR    FOOT SURGERY Right 04/04/2024    RIGHT TRANSMETATARSAL AMPUTATION performed by Corrie Berg MD at [facility] OR    IR TUNNELED CVC PLACE WO SQ PORT/PUMP > 5 YEARS  03/21/2022    IR TUNNELED CATHETER PLACEMENT GREATER THAN 5 YEARS 3/21/2022 [place] [facility]    IR TUNNELED CVC PLACE WO SQ PORT/PUMP > 5 YEARS  04/21/2022    IR TUNNELED CATHETER PLACEMENT GREATER THAN 5 YEARS 4/21/2022 [place] [facility]    IR TUNNELED CVC PLACE WO SQ PORT/PUMP > 5 YEARS  04/26/2022    IR TUNNELED CATHETER PLACEMENT GREATER THAN 5 YEARS 4/26/2022 [place] [facility]    IR TUNNELED CVC PLACE WO SQ PORT/PUMP > 5 YEARS  06/23/2022    IR TUNNELED CATHETER PLACEMENT GREATER THAN 5 YEARS 6/23/2022 [facility]    OTHER SURGICAL HISTORY  02/01/2017    laparoscopic cholecystectomy with intraoperative cholangiogram    OTHER SURGICAL HISTORY  2018    PORT PLACEMENT  - vas cath    OTHER SURGICAL HISTORY Bilateral 06/26/2018    laprascopic peritoneal dialysis catheter placement    OTHER SURGICAL HISTORY Right 09/2018    peritoneal dialysis port placed on right side of abdomen    OTHER SURGICAL HISTORY  05/28/2019    PTA/Stenting R External Iliac artery    MA LAPS INSERTION TUNNELED INTRAPERITONEAL CATHETER N/A 09/21/2018    LAPAROSCOPIC PERITONEAL DIALYSIS CATHETER REPLACEMENT performed by Remi Kincaid MD at [facility] OR    RECTAL SURGERY N/A 02/24/2022    PERINEAL ABCESS DRAINAGE performed by Remi Kincaid MD at [facility] OR    THORACENTESIS N/A 10/02/2022    THORACENTESIS ULTRASOUND performed by Charlie Esquivel MD at [facility] ENDOSCOPY    TONSILLECTOMY      UPPER GASTROINTESTINAL ENDOSCOPY  01/06/2016    UPPER GASTROINTESTINAL ENDOSCOPY

## 2025-01-08 NOTE — CONSULTS
[facility] Nurse  Consult Note       NAME:  Igor Ann  MEDICAL RECORD NUMBER:  [id number]  AGE: 54 y.o.   GENDER: male  : 1970  TODAY'S DATE:  2025    Subjective; Yes, I'm a diabetic, no I don't have a Podiatrist.    Reason for WOCN Evaluation and Assessment:     PT has diabetic ulcer on LLE         Igor Ann is a 54 y.o. male referred by:   [] Physician  [x] Nursing  [] Other:     Wound Identification:  Wound Type: diabetic  Contributing Factors: diabetes, poor glucose control, chronic pressure, decreased mobility, shear force, and obesity    Wound History: 24 [facility] eval pt for  right foot ulcers.  24 [facility] Wound Care eval. Pt for rt foot.  Rt foot has been amputated 24 by Dr. Berg.    Podiatry  in patient Dr. Berg last saw patient 24    Current Wound Care Treatment:  xeroform and dry dressing    Patient Goal of Care:  [x] Wound Healing  [] Odor Control  [] Palliative Care  [x] Pain Control   [] Other:         PAST MEDICAL HISTORY        Diagnosis Date    Ambulatory dysfunction     walker for long distances, SOB with distance    Aortic stenosis     echo     Arthritis     hands and hips    Asthma     Bilateral hilar adenopathy syndrome 2013    CAD (coronary artery disease)     Dr. Javier Chanel Heart    Cardiomyopathy ([facility]) 2019    EF= 43%    CHF, EF 35-40% (2024)     Chronic pain     COPD (chronic obstructive pulmonary disease) ([facility])     pulmonology Dr. Batista    CVA (cerebral vascular accident) ([facility]) 2017    Depression     Diabetes mellitus ([facility])     borderline    Difficult intravenous access     Emphysema of lung ([facility])     ESRD (end stage renal disease) on dialysis ([facility])     MWF    Fear of needles     Gastric ulcer     GERD (gastroesophageal reflux disease)     HD, T/R/Sa     Heart valve problem     bicuspic valve    History of spinal fracture     work incident    History of transcatheter aortic  SURGICAL HISTORY Right 2018    peritoneal dialysis port placed on right side of abdomen    OTHER SURGICAL HISTORY  2019    PTA/Stenting R External Iliac artery    NC LAPS INSERTION TUNNELED INTRAPERITONEAL CATHETER N/A 2018    LAPAROSCOPIC PERITONEAL DIALYSIS CATHETER REPLACEMENT performed by Remi Kincaid MD at [facility] OR    RECTAL SURGERY N/A 2022    PERINEAL ABCESS DRAINAGE performed by Remi Kincaid MD at [facility] OR    THORACENTESIS N/A 10/02/2022    THORACENTESIS ULTRASOUND performed by Charlie Esquivel MD at [facility] ENDOSCOPY    TONSILLECTOMY      UPPER GASTROINTESTINAL ENDOSCOPY  2016    UPPER GASTROINTESTINAL ENDOSCOPY  2017    possible candida, otherwise normal appearing    UPPER GASTROINTESTINAL ENDOSCOPY N/A 2024    ESOPHAGOGASTRODUODENOSCOPY BIOPSY performed by Maurizio Reyes MD at [facility] ENDOSCOPY    VASCULAR SURGERY  aprx 2 years ago    2 stents placed, each side of groin       FAMILY HISTORY    Family History   Problem Relation Age of Onset    Diabetes Mother     Heart Disease Mother     Heart Disease Father     Heart Attack Father     Kidney Disease Sister         stage 4-kidney failure    Cancer Sister     Heart Disease Sister     Obesity Sister     Cancer Sister     Heart Disease Sister     Obesity Sister     Alcohol Abuse Brother        SOCIAL HISTORY    Social History     Tobacco Use    Smoking status: Former     Current packs/day: 0.00     Average packs/day: 0.5 packs/day for 33.0 years (16.5 ttl pk-yrs)     Types: Cigarettes     Start date: 1987     Quit date: 2020     Years since quittin.7    Smokeless tobacco: Never    Tobacco comments:     States quit 2021   Vaping Use    Vaping status: Never Used   Substance Use Topics    Alcohol use: Not Currently    Drug use: No       ALLERGIES    Allergies   Allergen Reactions    Morphine Nausea And Vomiting       MEDICATIONS    No current facility-administered

## 2025-01-08 NOTE — H&P
Gómez Hwang MD   apixaban (ELIQUIS) 5 MG TABS tablet Take 1 tablet by mouth 2 times daily    Provider, MD Gerson   DULoxetine (CYMBALTA) 60 MG extended release capsule Take 1 capsule by mouth daily 3/17/23   Jackie Krause MD   ipratropium-albuterol (DUONEB) 0.5-2.5 (3) MG/3ML SOLN nebulizer solution Inhale 3 mLs into the lungs every 6 hours as needed for Shortness of Breath 10/15/17   Akash Lo MD       Past Medical History:   PMHx   Past Medical History:   Diagnosis Date    Ambulatory dysfunction     walker for long distances, SOB with distance    Aortic stenosis     echo 2017    Arthritis     hands and hips    Asthma     Bilateral hilar adenopathy syndrome 06/03/2013    CAD (coronary artery disease)     Dr. Javier Chanel Heart    Cardiomyopathy ([facility]) 04/19/2019    EF= 43%    CHF, EF 35-40% (July 2024)     Chronic pain     COPD (chronic obstructive pulmonary disease) ([facility])     pulmonology Dr. Batista    CVA (cerebral vascular accident) ([facility]) 05/21/2017    Depression     Diabetes mellitus ([facility])     borderline    Difficult intravenous access     Emphysema of lung ([facility])     ESRD (end stage renal disease) on dialysis ([facility])     MWF    Fear of needles     Gastric ulcer     GERD (gastroesophageal reflux disease)     HD, T/R/Sa     Heart valve problem     bicuspic valve    History of spinal fracture     work incident    History of transcatheter aortic valve replacement (TAVR)     reported by wife    Hx of blood clots     Bilateral lower extremities; stents in place    Hyperlipidemia     Hypertension     MI (myocardial infarction) ([facility]) 2019    has had 9 MIs. 2019 was the last    Neuromuscular disorder ([facility])     due to CVA    Numbness and tingling in left arm     from fistula    Pneumonia     PONV (postoperative nausea and vomiting)     Prolonged emergence from general anesthesia     States requires more medication than most people    Sleep apnea     Uses CPAP    Stroke ([facility])     7mm  thalamic cva 2017 deficts left side, left side weakness    TIA (transient ischemic attack)     Unspecified diseases of blood and blood-forming organs      PSHX:  has a past surgical history that includes Tonsillectomy; cyst removal (08/14/2013); Colonoscopy; Coronary angioplasty with stent (05/26/2015); vascular surgery (aprx 2 years ago); Colonoscopy (2/29/2015); Upper gastrointestinal endoscopy (01/06/2016); Upper gastrointestinal endoscopy (01/29/2017); other surgical history (02/01/2017); Dialysis fistula creation (Left, 10/30/2017); Diagnostic Cardiac Cath Lab Procedure; other surgical history (2018); other surgical history (Bilateral, 06/26/2018); pr laps insertion tunneled intraperitoneal catheter (N/A, 09/21/2018); other surgical history (Right, 09/2018); Dialysis fistula creation (Left, 03/27/2019); other surgical history (05/28/2019); Endoscopy, colon, diagnostic; Catheter Removal (Right, 07/02/2019); Aortic valve replacement (N/A, 10/15/2019); Rectal surgery (N/A, 02/24/2022); IR TUNNELED CVC PLACE WO SQ PORT/PUMP > 5 YEARS (03/21/2022); IR TUNNELED CVC PLACE WO SQ PORT/PUMP > 5 YEARS (04/21/2022); IR TUNNELED CVC PLACE WO SQ PORT/PUMP > 5 YEARS (04/26/2022); IR TUNNELED CVC PLACE WO SQ PORT/PUMP > 5 YEARS (06/23/2022); thoracentesis (N/A, 10/02/2022); Foot Debridement (Right, 05/26/2023); Foot surgery (Right, 05/26/2023); Foot surgery (Right, 04/04/2024); Foot Debridement (Right, 05/02/2024); Esophagogastroduodenoscopy (07/08/2024); Upper gastrointestinal endoscopy (N/A, 7/8/2024); Cardiac procedure (N/A, 7/10/2024); Cardiac procedure (N/A, 10/21/2024); Cardiac procedure (N/A, 10/21/2024); Cardiac procedure (N/A, 10/21/2024); Cardiac procedure (N/A, 10/21/2024); Cardiac procedure (N/A, 10/21/2024); and Cardiac procedure (N/A, 10/21/2024).  Allergies:   Allergies   Allergen Reactions    Morphine Nausea And Vomiting     Fam HX: family history includes Alcohol Abuse in his brother; Cancer in his sister and

## 2025-01-08 NOTE — DISCHARGE INSTRUCTIONS
Heart Failure Resources:  Heart Failure Interactive Workbook:  Go to https://[employer].Nivela/publication/?b=037025 for a Free Heart Failure Interactive Workbook provided by The American Heart Association. This interactive workbook will provide information on Healthier Living with Heart Failure. Please copy and paste link into search bar. Use your mouse to scroll through the pages.    HF [place] stephanie:   Heart Failure Free smart phone stephanie available for iPhone and Android download. Use your phone to track sodium intake, fluid intake, symptoms, and weight.     Low Sodium Diet / Recipes:  Go to www.[employer] website for “renal” diet which is Low Sodium! [employer] is a dialysis company, but this website offers free seasonal cookbooks. Each quarter, they will release 25-30 new recipes with a breakdown of calories, sodium, and glucose. You can also go to www.[employer]/recipes website for free recipes.     Home Exercise Program:   Identification of Green/Yellow/Red zones:  You should be able to identify when you feel good (green zone), if you have 1-2 symptoms of HF (yellow zone), or if you are in need of medical attention (red zone).  In your CHF education folder you were provided a “stop light tool” to outline this information.     We want to you to rate your exertion levels:    Our therapy team has discussed means of identification with you such as the \"John scale.\"  The John rating scale ranges from 6 to 20, where 6 means \"no exertion at all\" and 20 means \"maximal exertion.\" The goal is to use this to gauge how much effort it is taking for you to do your normal daily tasks.   You should be able to recognize when too much exertion is being expended.    Elements of Energy Conservation:   Prioritize/Plan: Decide what needs to be done today, and what can wait for a later date, write to do lists, plan ahead to avoid extra trips, and gather supplies and equipment needed before starting an activity.   Position:

## 2025-01-08 NOTE — CARE COORDINATION
CM update; Followed by IM, Nephrology, cardiology. Patient refused stress test today. Will receive dialysis tomorrow. Explained importance of not refusing cardiac testing.Patient is LTC at [facility] an can return no barriers.Belkis Mcdaniel RN

## 2025-01-08 NOTE — PROGRESS NOTES
[facility] Medicine Progress Note  V 1.6      Date of Admission: 1/7/2025    Hospital Day: 2      Chief Admission Complaint:  sob and chest pain    Subjective: He is sitting up in bed, states he feels weak and tired.  He denies any chest pain or shortness of breath at this time.    Presenting Admission History:       54 Y M with a h/o COPD on home oxygen, 4LNC, HTN, DM2 with partial R foot amputation, multivessel CAD deemed not a candidate for CABG s/p stents (most recently in 10/2024), ischemic cardiomyopathy with EF 35-40%, CHF, Afib, CVA, bicuspid AS s/p 2019 TAVR, PAD s/p R iliac stent, and ESRD on HD.  It looks like he was admitted 7x last year, often for chest pain and/or CHF (sometimes misses HD).  He lives at [facility].  The patient presented to [facility] on 1/6 with chest pain and dyspnea.  They saw pulmonary edema and bilateral pleural effusions on his CXR and nephrology was consulted for HD.  Cardiology was consulted because of the elevated troponin and recommended transfer to [facility] for interventional cardiology eval.  Upon arrival here the patient was breathing easily but did report ongoing dyspnea above baseline, worse orthopnea, worse edema in his abdomen.  No fevers or chills.  No further chest pain.      Assessment/Plan:      Current Principal Problem:  Atrial fibrillation with rapid ventricular response (HCC)    End-stage renal disease : He is on hemodialysis on Tuesday Thursday Saturday schedule.  No report that he has missed any dialysis sessions recently.  Nephrology has been consulted and their input is noted and appreciated.    Atrial fibrillation with RVR: This is present at time of admission, he was started on diltiazem IV infusion  Heart rate is gotten better controlled, I have discontinued IV diltiazem infusion and is now on oral diltiazem.  Continue on Eliquis for anticoagulation.    Cardiology consulted and input is noted and appreciated    Chest pain with a history of CAD:    Noted

## 2025-01-08 NOTE — PROGRESS NOTES
4 Eyes Skin Assessment     NAME:  Igor Ann  YOB: 1970  MEDICAL RECORD NUMBER:  [id number]    The patient is being assessed for  Admission    I agree that at least one RN has performed a thorough Head to Toe Skin Assessment on the patient. ALL assessment sites listed below have been assessed.      Areas assessed by both nurses:    Legs. Feet and Heels        Does the Patient have a Wound? Yes wound(s) were present on assessment. LDA wound assessment was Initiated and completed by RN       Isaac Prevention initiated by RN: Yes  Wound Care Orders initiated by RN: Yes    Pressure Injury (Stage 3,4, Unstageable, DTI, NWPT, and Complex wounds) if present, place Wound referral order by RN under : Yes    New Ostomies, if present place, Ostomy referral order under : No     Nurse 1 eSignature: Electronically signed by Helen Paiz RN on 1/7/25 at 7:57 PM EST    **SHARE this note so that the co-signing nurse can place an eSignature**    Nurse 2 eSignature: {Esignature:[id number]}

## 2025-01-08 NOTE — PROGRESS NOTES
Report received from Igor MORFIN. Transfer of care at this time. Pt resting in bed with eyes closed. Cardiology made aware pt refused stress test and echo today.

## 2025-01-08 NOTE — CONSULTS
[facility] ([facility])   Nephrology Note  [phone] / [phone] / [phone]   [email or web address]    Patient: Igor Ann    MRN: [id number]    : 1970     Reason for Consult: ESRD Management  Requesting Provider: Dr Hernandez    History of Present Illness / Subjective:    Igor Ann is a 54 y.o. male with a PMHx of COPD on 4LNC, HTN, DM2 with partial R foot amputation, multivessel CAD deemed not a candidate for CABG s/p stents (most recently in 10/2024), ischemic cardiomyopathy with EF 35-40%, CHF, Afib, CVA, bicuspid AS s/p 2019 TAVR, PAD s/p R iliac stent, and ESRD on HD.  Patient presents with Pain all over, chest pain.  Admitted with Elevated troponin and Afib with RVR.     Igor Ann reports that he is feeling better, no complaints today.  He was transferred from [place] to [place] for intervention cardiology evals.  He was seen by Cardiology for chest pain, afib with rvr, elevated troponin.  Initially on Dilt gtt, then stopped or hypotension.  He has a complex cardiac history.     Chart reviewed.  Patient was seen and examined.  He lives at [facility].  He reports compliance with dialysis recently, has attended last few treatments, but he cuts them short.      Social Hx: no visitors present      No chief complaint on file.      Past Medical History:   Diagnosis Date    Ambulatory dysfunction     walker for long distances, SOB with distance    Aortic stenosis     echo     Arthritis     hands and hips    Asthma     Bilateral hilar adenopathy syndrome 2013    CAD (coronary artery disease)     Dr. Javier Chanel Heart    Cardiomyopathy ([facility]) 2019    EF= 43%    CHF, EF 35-40% (2024)     Chronic pain     COPD (chronic obstructive pulmonary disease) ([facility])     pulmonology Dr. Batista    CVA (cerebral vascular accident) ([facility]) 2017    Depression     Diabetes mellitus ([facility])     borderline    Difficult intravenous access      OTHER SURGICAL HISTORY  02/01/2017    laparoscopic cholecystectomy with intraoperative cholangiogram    OTHER SURGICAL HISTORY  2018    PORT PLACEMENT  - vas cath    OTHER SURGICAL HISTORY Bilateral 06/26/2018    laprascopic peritoneal dialysis catheter placement    OTHER SURGICAL HISTORY Right 09/2018    peritoneal dialysis port placed on right side of abdomen    OTHER SURGICAL HISTORY  05/28/2019    PTA/Stenting R External Iliac artery    IN LAPS INSERTION TUNNELED INTRAPERITONEAL CATHETER N/A 09/21/2018    LAPAROSCOPIC PERITONEAL DIALYSIS CATHETER REPLACEMENT performed by Remi Kincaid MD at [facility] OR    RECTAL SURGERY N/A 02/24/2022    PERINEAL ABCESS DRAINAGE performed by Remi Kincaid MD at [facility] OR    THORACENTESIS N/A 10/02/2022    THORACENTESIS ULTRASOUND performed by Charlie Esquivel MD at [facility] ENDOSCOPY    TONSILLECTOMY      UPPER GASTROINTESTINAL ENDOSCOPY  01/06/2016    UPPER GASTROINTESTINAL ENDOSCOPY  01/29/2017    possible candida, otherwise normal appearing    UPPER GASTROINTESTINAL ENDOSCOPY N/A 7/8/2024    ESOPHAGOGASTRODUODENOSCOPY BIOPSY performed by Maurizio eRyes MD at [facility] ENDOSCOPY    VASCULAR SURGERY  aprx 2 years ago    2 stents placed, each side of groin        Allergies:  Morphine    Current Medications:    Scheduled Meds:   dilTIAZem  30 mg Oral 4 times per day    aspirin  81 mg Oral Daily    apixaban  5 mg Oral BID    atorvastatin  80 mg Oral Daily    busPIRone  10 mg Oral TID    calcium acetate  2 capsule Oral TID    clopidogrel  75 mg Oral Daily    DULoxetine  60 mg Oral Daily    insulin glargine  15 Units SubCUTAneous Nightly    isosorbide mononitrate  30 mg Oral Daily    metoprolol succinate  25 mg Oral Daily    pantoprazole  40 mg Oral Daily    pregabalin  25 mg Oral Daily    [Held by provider] sacubitril-valsartan  1 tablet Oral BID    sodium chloride flush  5-40 mL IntraVENous 2 times per day    insulin lispro  0-8 Units SubCUTAneous 4x

## 2025-01-09 ENCOUNTER — APPOINTMENT (OUTPATIENT)
Age: 55
End: 2025-01-09
Attending: INTERNAL MEDICINE
Payer: MEDICARE

## 2025-01-09 LAB
ANION GAP SERPL CALCULATED.3IONS-SCNC: 21 MMOL/L (ref 3–16)
BUN SERPL-MCNC: 82 MG/DL (ref 7–20)
CALCIUM SERPL-MCNC: 8.7 MG/DL (ref 8.3–10.6)
CHLORIDE SERPL-SCNC: 87 MMOL/L (ref 99–110)
CO2 SERPL-SCNC: 24 MMOL/L (ref 21–32)
CREAT SERPL-MCNC: 8.9 MG/DL (ref 0.9–1.3)
DEPRECATED RDW RBC AUTO: 19.4 % (ref 12.4–15.4)
ECHO AO ASC DIAM: 3.2 CM
ECHO AO ASCENDING AORTA INDEX: 1.44 CM/M2
ECHO AO ROOT DIAM: 3.2 CM
ECHO AO ROOT INDEX: 1.44 CM/M2
ECHO AV AREA PEAK VELOCITY: 2.6 CM2
ECHO AV AREA VTI: 2.1 CM2
ECHO AV AREA/BSA PEAK VELOCITY: 1.2 CM2/M2
ECHO AV AREA/BSA VTI: 0.9 CM2/M2
ECHO AV MEAN GRADIENT: 4 MMHG
ECHO AV MEAN VELOCITY: 1 M/S
ECHO AV PEAK GRADIENT: 9 MMHG
ECHO AV PEAK VELOCITY: 1.5 M/S
ECHO AV VELOCITY RATIO: 0.67
ECHO AV VTI: 27.3 CM
ECHO BSA: 2.23 M2
ECHO EST RA PRESSURE: 3 MMHG
ECHO IVC PROX: 1.8 CM
ECHO LA AREA 2C: 27.8 CM2
ECHO LA AREA 4C: 26.3 CM2
ECHO LA DIAMETER INDEX: 2.3 CM/M2
ECHO LA DIAMETER: 5.1 CM
ECHO LA MAJOR AXIS: 6.3 CM
ECHO LA MINOR AXIS: 5.9 CM
ECHO LA TO AORTIC ROOT RATIO: 1.59
ECHO LA VOL BP: 95 ML (ref 18–58)
ECHO LA VOL MOD A2C: 102 ML (ref 18–58)
ECHO LA VOL MOD A4C: 82 ML (ref 18–58)
ECHO LA VOL/BSA BIPLANE: 43 ML/M2 (ref 16–34)
ECHO LA VOLUME INDEX MOD A2C: 46 ML/M2 (ref 16–34)
ECHO LA VOLUME INDEX MOD A4C: 37 ML/M2 (ref 16–34)
ECHO LV E' SEPTAL VELOCITY: 8.42 CM/S
ECHO LV EDV A2C: 118 ML
ECHO LV EDV A4C: 182 ML
ECHO LV EDV BP: 148 ML (ref 67–155)
ECHO LV EDV INDEX A4C: 82 ML/M2
ECHO LV EDV INDEX BP: 67 ML/M2
ECHO LV EDV NDEX A2C: 53 ML/M2
ECHO LV EF PHYSICIAN: 25 %
ECHO LV EJECTION FRACTION A2C: 14 %
ECHO LV EJECTION FRACTION A4C: 24 %
ECHO LV EJECTION FRACTION BIPLANE: 18 % (ref 55–100)
ECHO LV ESV A2C: 102 ML
ECHO LV ESV A4C: 138 ML
ECHO LV ESV BP: 122 ML (ref 22–58)
ECHO LV ESV INDEX A2C: 46 ML/M2
ECHO LV ESV INDEX A4C: 62 ML/M2
ECHO LV ESV INDEX BP: 55 ML/M2
ECHO LV FRACTIONAL SHORTENING: 16 % (ref 28–44)
ECHO LV INTERNAL DIMENSION DIASTOLE INDEX: 3.02 CM/M2
ECHO LV INTERNAL DIMENSION DIASTOLIC: 6.7 CM (ref 4.2–5.9)
ECHO LV INTERNAL DIMENSION SYSTOLIC INDEX: 2.52 CM/M2
ECHO LV INTERNAL DIMENSION SYSTOLIC: 5.6 CM
ECHO LV ISOVOLUMETRIC RELAXATION TIME (IVRT): 67 MS
ECHO LV IVSD: 0.9 CM (ref 0.6–1)
ECHO LV MASS 2D: 243.5 G (ref 88–224)
ECHO LV MASS INDEX 2D: 109.7 G/M2 (ref 49–115)
ECHO LV POSTERIOR WALL DIASTOLIC: 0.8 CM (ref 0.6–1)
ECHO LV RELATIVE WALL THICKNESS RATIO: 0.24
ECHO LVOT AREA: 4.2 CM2
ECHO LVOT AV VTI INDEX: 0.51
ECHO LVOT DIAM: 2.3 CM
ECHO LVOT MEAN GRADIENT: 2 MMHG
ECHO LVOT PEAK GRADIENT: 4 MMHG
ECHO LVOT PEAK VELOCITY: 1 M/S
ECHO LVOT STROKE VOLUME INDEX: 26.2 ML/M2
ECHO LVOT SV: 58.1 ML
ECHO LVOT VTI: 14 CM
ECHO MV AREA PHT: 3 CM2
ECHO MV AREA VTI: 2.2 CM2
ECHO MV E VELOCITY: 1.23 M/S
ECHO MV E/E' SEPTAL: 14.61
ECHO MV LVOT VTI INDEX: 1.86
ECHO MV MAX VELOCITY: 1.4 M/S
ECHO MV MEAN GRADIENT: 4 MMHG
ECHO MV MEAN VELOCITY: 0.9 M/S
ECHO MV PEAK GRADIENT: 8 MMHG
ECHO MV PRESSURE HALF TIME (PHT): 73 MS
ECHO MV VTI: 26 CM
ECHO RA AREA 4C: 14.3 CM2
ECHO RA END SYSTOLIC VOLUME APICAL 4 CHAMBER INDEX BSA: 16 ML/M2
ECHO RA VOLUME: 35 ML
ECHO RIGHT VENTRICULAR SYSTOLIC PRESSURE (RVSP): 42 MMHG
ECHO RV BASAL DIMENSION: 4.4 CM
ECHO RV FREE WALL PEAK S': 7 CM/S
ECHO RV INTERNAL DIMENSION: 4.2 CM
ECHO RV LONGITUDINAL DIMENSION: 8.6 CM
ECHO RV MID DIMENSION: 4 CM
ECHO RV TAPSE: 1.3 CM (ref 1.7–?)
ECHO TV REGURGITANT MAX VELOCITY: 3.12 M/S
ECHO TV REGURGITANT PEAK GRADIENT: 39 MMHG
EST. AVERAGE GLUCOSE BLD GHB EST-MCNC: 157.1 MG/DL
GFR SERPLBLD CREATININE-BSD FMLA CKD-EPI: 6 ML/MIN/{1.73_M2}
GLUCOSE BLD-MCNC: 110 MG/DL (ref 70–99)
GLUCOSE BLD-MCNC: 115 MG/DL (ref 70–99)
GLUCOSE BLD-MCNC: 122 MG/DL (ref 70–99)
GLUCOSE BLD-MCNC: 173 MG/DL (ref 70–99)
GLUCOSE BLD-MCNC: 219 MG/DL (ref 70–99)
GLUCOSE SERPL-MCNC: 122 MG/DL (ref 70–99)
HBA1C MFR BLD: 7.1 %
HCT VFR BLD AUTO: 28.6 % (ref 40.5–52.5)
HGB BLD-MCNC: 9.2 G/DL (ref 13.5–17.5)
MCH RBC QN AUTO: 27.4 PG (ref 26–34)
MCHC RBC AUTO-ENTMCNC: 32.2 G/DL (ref 31–36)
MCV RBC AUTO: 85 FL (ref 80–100)
PERFORMED ON: ABNORMAL
PLATELET # BLD AUTO: 190 K/UL (ref 135–450)
PMV BLD AUTO: 9.6 FL (ref 5–10.5)
POTASSIUM SERPL-SCNC: 4.8 MMOL/L (ref 3.5–5.1)
RBC # BLD AUTO: 3.37 M/UL (ref 4.2–5.9)
SODIUM SERPL-SCNC: 132 MMOL/L (ref 136–145)
WBC # BLD AUTO: 8.5 K/UL (ref 4–11)

## 2025-01-09 PROCEDURE — 2060000000 HC ICU INTERMEDIATE R&B

## 2025-01-09 PROCEDURE — 2700000000 HC OXYGEN THERAPY PER DAY

## 2025-01-09 PROCEDURE — 6370000000 HC RX 637 (ALT 250 FOR IP): Performed by: INTERNAL MEDICINE

## 2025-01-09 PROCEDURE — 6360000004 HC RX CONTRAST MEDICATION: Performed by: INTERNAL MEDICINE

## 2025-01-09 PROCEDURE — 93306 TTE W/DOPPLER COMPLETE: CPT | Performed by: INTERNAL MEDICINE

## 2025-01-09 PROCEDURE — 2500000003 HC RX 250 WO HCPCS: Performed by: INTERNAL MEDICINE

## 2025-01-09 PROCEDURE — 6360000002 HC RX W HCPCS: Performed by: INTERNAL MEDICINE

## 2025-01-09 PROCEDURE — 94761 N-INVAS EAR/PLS OXIMETRY MLT: CPT

## 2025-01-09 PROCEDURE — 83036 HEMOGLOBIN GLYCOSYLATED A1C: CPT

## 2025-01-09 PROCEDURE — C8929 TTE W OR WO FOL WCON,DOPPLER: HCPCS

## 2025-01-09 PROCEDURE — 36415 COLL VENOUS BLD VENIPUNCTURE: CPT

## 2025-01-09 PROCEDURE — 80048 BASIC METABOLIC PNL TOTAL CA: CPT

## 2025-01-09 PROCEDURE — 85027 COMPLETE CBC AUTOMATED: CPT

## 2025-01-09 PROCEDURE — 99233 SBSQ HOSP IP/OBS HIGH 50: CPT | Performed by: INTERNAL MEDICINE

## 2025-01-09 RX ORDER — REGADENOSON 0.08 MG/ML
0.4 INJECTION, SOLUTION INTRAVENOUS
Status: CANCELLED | OUTPATIENT
Start: 2025-01-09

## 2025-01-09 RX ORDER — MIDODRINE HYDROCHLORIDE 5 MG/1
TABLET ORAL
Status: DISPENSED
Start: 2025-01-09 | End: 2025-01-09

## 2025-01-09 RX ORDER — DILTIAZEM HYDROCHLORIDE 120 MG/1
120 CAPSULE, COATED, EXTENDED RELEASE ORAL DAILY
Status: DISCONTINUED | OUTPATIENT
Start: 2025-01-09 | End: 2025-01-10

## 2025-01-09 RX ORDER — HEPARIN SODIUM 1000 [USP'U]/ML
INJECTION, SOLUTION INTRAVENOUS; SUBCUTANEOUS
Status: DISPENSED
Start: 2025-01-09 | End: 2025-01-10

## 2025-01-09 RX ADMIN — ASPIRIN 81 MG: 81 TABLET, CHEWABLE ORAL at 10:02

## 2025-01-09 RX ADMIN — APIXABAN 5 MG: 5 TABLET, FILM COATED ORAL at 10:02

## 2025-01-09 RX ADMIN — METOPROLOL SUCCINATE 12.5 MG: 25 TABLET, FILM COATED, EXTENDED RELEASE ORAL at 10:03

## 2025-01-09 RX ADMIN — CALCIUM ACETATE 1334 MG: 667 CAPSULE ORAL at 15:00

## 2025-01-09 RX ADMIN — BUSPIRONE HYDROCHLORIDE 10 MG: 5 TABLET ORAL at 15:00

## 2025-01-09 RX ADMIN — ACETAMINOPHEN 650 MG: 325 TABLET ORAL at 01:41

## 2025-01-09 RX ADMIN — ISOSORBIDE MONONITRATE 30 MG: 30 TABLET, EXTENDED RELEASE ORAL at 10:03

## 2025-01-09 RX ADMIN — OXYCODONE 10 MG: 5 TABLET ORAL at 01:41

## 2025-01-09 RX ADMIN — APIXABAN 5 MG: 5 TABLET, FILM COATED ORAL at 21:21

## 2025-01-09 RX ADMIN — CALCIUM ACETATE 1334 MG: 667 CAPSULE ORAL at 10:04

## 2025-01-09 RX ADMIN — BUSPIRONE HYDROCHLORIDE 10 MG: 5 TABLET ORAL at 10:02

## 2025-01-09 RX ADMIN — OXYCODONE 10 MG: 5 TABLET ORAL at 22:16

## 2025-01-09 RX ADMIN — CALCIUM ACETATE 1334 MG: 667 CAPSULE ORAL at 21:21

## 2025-01-09 RX ADMIN — PREGABALIN 25 MG: 25 CAPSULE ORAL at 10:02

## 2025-01-09 RX ADMIN — BUSPIRONE HYDROCHLORIDE 10 MG: 5 TABLET ORAL at 21:21

## 2025-01-09 RX ADMIN — ATORVASTATIN CALCIUM 80 MG: 80 TABLET, FILM COATED ORAL at 10:02

## 2025-01-09 RX ADMIN — PROCHLORPERAZINE EDISYLATE 10 MG: 5 INJECTION INTRAMUSCULAR; INTRAVENOUS at 12:06

## 2025-01-09 RX ADMIN — CLOPIDOGREL BISULFATE 75 MG: 75 TABLET ORAL at 10:02

## 2025-01-09 RX ADMIN — INSULIN LISPRO 2 UNITS: 100 INJECTION, SOLUTION INTRAVENOUS; SUBCUTANEOUS at 21:22

## 2025-01-09 RX ADMIN — SODIUM CHLORIDE, PRESERVATIVE FREE 10 ML: 5 INJECTION INTRAVENOUS at 21:22

## 2025-01-09 RX ADMIN — PANTOPRAZOLE SODIUM 40 MG: 40 TABLET, DELAYED RELEASE ORAL at 10:02

## 2025-01-09 RX ADMIN — SULFUR HEXAFLUORIDE 2 ML: KIT at 09:00

## 2025-01-09 RX ADMIN — MIDODRINE HYDROCHLORIDE 10 MG: 5 TABLET ORAL at 10:46

## 2025-01-09 RX ADMIN — DULOXETINE HYDROCHLORIDE 60 MG: 60 CAPSULE, DELAYED RELEASE ORAL at 10:02

## 2025-01-09 RX ADMIN — DILTIAZEM HYDROCHLORIDE 120 MG: 120 CAPSULE, COATED, EXTENDED RELEASE ORAL at 15:00

## 2025-01-09 RX ADMIN — INSULIN GLARGINE 15 UNITS: 100 INJECTION, SOLUTION SUBCUTANEOUS at 21:21

## 2025-01-09 ASSESSMENT — PAIN SCALES - GENERAL
PAINLEVEL_OUTOF10: 9
PAINLEVEL_OUTOF10: 9

## 2025-01-09 NOTE — FLOWSHEET NOTE
Patient return from HD.      01/09/25 1440   Vital Signs   Temp 97.9 °F (36.6 °C)   Temp Source Oral   Pulse 68   Heart Rate Source Monitor   Respirations 20   BP (!) 123/95   MAP (Calculated) 104   BP Location Right Arm   BP Method Automatic   Patient Position Semi fowlers   Pain Assessment   Pain Assessment None - Denies Pain   Oxygen Therapy   SpO2 99 %   O2 Device Nasal cannula   O2 Flow Rate (L/min) 4 L/min

## 2025-01-09 NOTE — PLAN OF CARE
Problem: Pain  Goal: Verbalizes/displays adequate comfort level or baseline comfort level  Outcome: Progressing     Problem: Skin/Tissue Integrity  Goal: Absence of new skin breakdown  Description: 1.  Monitor for areas of redness and/or skin breakdown  2.  Assess vascular access sites hourly  3.  Every 4-6 hours minimum:  Change oxygen saturation probe site  4.  Every 4-6 hours:  If on nasal continuous positive airway pressure, respiratory therapy assess nares and determine need for appliance change or resting period.  Outcome: Progressing     Problem: ABCDS Injury Assessment  Goal: Absence of physical injury  Outcome: Progressing     Problem: Safety - Adult  Goal: Free from fall injury  Outcome: Progressing     Problem: Chronic Conditions and Co-morbidities  Goal: Patient's chronic conditions and co-morbidity symptoms are monitored and maintained or improved  Outcome: Progressing   CHF Care Plan      Patient's EF (Ejection Fraction) is less than 40%    Heart Failure Medications:  Diuretics:: None    (One of the following REQUIRED for EF </= 40%/SYSTOLIC FAILURE but MAY be used in EF% >40%/DIASTOLIC FAILURE)        ACE:: None        ARB:: None         ARNI:: Sacubitril/Valsartan-Entresto    (Beta Blockers)  NON- Evidenced Based Beta Blocker (for EF% >40%/DIASTOLIC FAILURE): None    Evidenced Based Beta Blocker::(REQUIRED for EF% <40%/SYSTOLIC FAILURE) Metoprolol SUCCinate- Toprol XL  ...................................................................................................................................................    Failed to redirect to the Timeline version of the [email or web address] SmartLink.      Patient's weights and intake/output reviewed    Daily Weight log at bedside, patient/family participation in use of log: \"yes    Patient's current weight today 235 lbs      Intake/Output Summary (Last 24 hours) at 1/9/2025 1215  Last data filed at 1/9/2025 1011  Gross per 24 hour   Intake 480 ml   Output 0 ml   Net

## 2025-01-09 NOTE — PROGRESS NOTES
Podiatry Progress Note    Subjective:   Patient seen at bedside this evening.  No new pedal complaints.     Objective:   Vitals:  BP (!) 123/95   Pulse 68   Temp 97.9 °F (36.6 °C) (Oral)   Resp 20   Ht 1.753 m (5' 9.02\")   Wt 107 kg (235 lb 14.3 oz)   SpO2 99%   BMI 34.82 kg/m²   Integument:  Full thickness ulceration at the lateral left fifth metatarsal head.  It measures approximately 0.5cm x 0.8cm x 0.2cm.  The base was granular. Slight serous drainage.  No surrounding erythema.  No purulence. The wound did not probe or undermine.  No acute signs of infection.  Otherwise, skin was warm, dry and supple, bilateral.   Neurologic:  Protective sensation is grossly diminished to light touch at the level of the distal foot, bilateral.  Vascular:  DP and PT pulses are palpable, bilateral.  CFT is brisk to the distal foot, bilateral.  No significant edema appreciated.  Musculoskeletal:  TMA on the right.  No gross musculoskeletal deformities on the left.     Labs:   CBC with Differential:    Lab Results   Component Value Date/Time    WBC 8.5 01/09/2025 05:04 AM    RBC 3.37 01/09/2025 05:04 AM    HGB 9.2 01/09/2025 05:04 AM    HCT 28.6 01/09/2025 05:04 AM     01/09/2025 05:04 AM    MCV 85.0 01/09/2025 05:04 AM    MCH 27.4 01/09/2025 05:04 AM    MCHC 32.2 01/09/2025 05:04 AM    RDW 19.4 01/09/2025 05:04 AM    BANDSPCT 2 01/07/2025 08:42 AM    METASPCT 1 04/07/2024 05:12 AM    LYMPHOPCT 2.0 01/07/2025 08:42 AM    MONOPCT 13.0 01/07/2025 08:42 AM    MYELOPCT 1 04/07/2024 05:12 AM    EOSPCT 0.0 01/07/2025 08:42 AM    BASOPCT 1.0 01/07/2025 08:42 AM    MONOSABS 1.3 01/07/2025 08:42 AM    LYMPHSABS 0.2 01/07/2025 08:42 AM    EOSABS 0.0 01/07/2025 08:42 AM    BASOSABS 0.1 01/07/2025 08:42 AM    DIFFTYPE Auto 05/17/2013 06:50 AM     CMP:    Lab Results   Component Value Date/Time     01/09/2025 05:04 AM    K 4.8 01/09/2025 05:04 AM    K 5.0 01/07/2025 08:42 AM    CL 87 01/09/2025 05:04 AM    CO2 24 01/09/2025

## 2025-01-09 NOTE — PROGRESS NOTES
01/09/25  0504   * 133* 132*   K 4.7 5.0 4.8   CL 88* 89* 87*   CO2 29 28 24   BUN 47* 56* 82*   CREATININE 5.3* 6.1* 8.9*   CALCIUM 8.6 8.5 8.7     Recent Labs     01/06/25  2342 01/07/25  0054 01/07/25  0248 01/07/25  0842   PROBNP >70,000*  --   --   --    TROPHS 242* 260* 258* 326*     No results for input(s): \"LABA1C\" in the last 72 hours.  Recent Labs     01/06/25  2342 01/07/25  0842   AST 28 35   ALT 17 19   BILITOT 0.3 0.3   ALKPHOS 142* 130*     No results for input(s): \"INR\", \"LACTA\", \"TSH\" in the last 72 hours.    Urine Cultures:   Lab Results   Component Value Date/Time    LABURIN  05/23/2024 08:00 PM     <50,000 CFU/ml mixed skin/urogenital mauri. No further workup     Blood Cultures:   Lab Results   Component Value Date/Time    BC No Growth after 4 days of incubation. 05/02/2024 04:55 PM     Lab Results   Component Value Date/Time    BLOODCULT2 No Growth after 4 days of incubation. 05/02/2024 05:16 PM     Organism:   Lab Results   Component Value Date/Time    ORG Staphylococcus epidermidis 05/02/2024 01:23 PM         EDVIN HOWE MD        V 10.25    Date of Admission: 1/7/2025    Hospital Day: 3      No chief complaint on file.        Current Principal Problem:  Atrial fibrillation with rapid ventricular response (HCC)      Consults:      IP CONSULT TO NEPHROLOGY  IP CONSULT TO PODIATRY

## 2025-01-09 NOTE — DIALYSIS
Treatment time: 3 hours  Net UF: 2400 ml     Pre weight: 107 kg  Post weight:104.6 kg      Access used: TDC    Access function: good with  ml/min     Medications or blood products given: midodrine     Regular outpatient schedule: TTS     Summary of response to treatment: Patient tolerated treatment with episode of hypotension during the last 30minutes of treatment. Report given to Mary Lou Spangler RN.  Copy of dialysis treatment record placed in chart, to be scanned into EMR.

## 2025-01-09 NOTE — PROGRESS NOTES
Nephrology Progress Note   [facility]      Sub/interval history  Seen and examined at HD, UF goal increased to 4 L  but he was not able to tolerate dt hypotension and only 2.6l net UF obtained ' pre wt 107 kg    ROS: No chest pain/shortness of breath/fever/nausea/vomiting  PSFH: No visitor    Scheduled Meds:   dilTIAZem  120 mg Oral Daily    heparin (porcine)        metoprolol succinate  12.5 mg Oral Daily    aspirin  81 mg Oral Daily    apixaban  5 mg Oral BID    atorvastatin  80 mg Oral Daily    busPIRone  10 mg Oral TID    calcium acetate  2 capsule Oral TID    clopidogrel  75 mg Oral Daily    DULoxetine  60 mg Oral Daily    insulin glargine  15 Units SubCUTAneous Nightly    isosorbide mononitrate  30 mg Oral Daily    pantoprazole  40 mg Oral Daily    pregabalin  25 mg Oral Daily    [Held by provider] sacubitril-valsartan  1 tablet Oral BID    sodium chloride flush  5-40 mL IntraVENous 2 times per day    insulin lispro  0-8 Units SubCUTAneous 4x Daily AC & HS     Continuous Infusions:   sodium chloride      dextrose       PRN Meds:.heparin (porcine), regadenoson, ipratropium 0.5 mg-albuterol 2.5 mg, midodrine, oxyCODONE, sodium chloride flush, sodium chloride, polyethylene glycol, acetaminophen **OR** acetaminophen, prochlorperazine, glucose, dextrose bolus **OR** dextrose bolus, glucagon (rDNA), dextrose    Objective/     Vitals:    01/08/25 2345 01/09/25 0445 01/09/25 0500 01/09/25 0918   BP: 93/62  95/65 106/88   Pulse: 80  68 92   Resp: 20  20 20   Temp: 97.5 °F (36.4 °C)  97.3 °F (36.3 °C) 97.3 °F (36.3 °C)   TempSrc: Oral  Oral Oral   SpO2: 98%  97% 96%   Weight:  107 kg (235 lb 14.3 oz)     Height:         24HR INTAKE/OUTPUT:    Intake/Output Summary (Last 24 hours) at 1/9/2025 1429  Last data filed at 1/9/2025 1011  Gross per 24 hour   Intake 480 ml   Output 0 ml   Net 480 ml     Gen: alert, awake  Neck: No JVD  Skin: Unremarkable  Cardiovascular:  S1, S2 without m/r/g   Respiratory: CTA B  without w/r/r; respiratory effort normal  Abdomen:  soft, nt, nd,   Extremities: no lower extremity edema  Neuro/Psy: AAoriented times 3 ; moves all 4 ext    Data/  Recent Labs     01/06/25  2342 01/07/25  0842 01/09/25  0504   WBC 7.5 9.8 8.5   HGB 10.1* 10.1* 9.2*   HCT 31.7* 31.5* 28.6*   MCV 84.8 85.5 85.0    192 190     Recent Labs     01/06/25  2342 01/07/25  0842 01/09/25  0504   * 133* 132*   K 4.7 5.0 4.8   CL 88* 89* 87*   CO2 29 28 24   GLUCOSE 213* 216* 122*   BUN 47* 56* 82*   CREATININE 5.3* 6.1* 8.9*   LABGLOM 12* 10* 6*       Assessment/   # ESRD on HD:    - Patient is on dialysis TTS at [facility] with a working [employer] TDC.    - Patient with longstanding high interdialytic weight gains.  -  kg.    # Chest Pain:  - H/o complex PCI, CAD s/p stents, TAVR.   - Per Cardiology.       # Hyperkalemia: improved recently.      # Anemia of chronic disease:    - Hgb is near targets.   - Getting EPO with dialysis     # DM2 with foot ulcers:   - Podiatry was consulted.       # Afib with RVR:  - S/p Dilt gtt.  Now on Dilt PO.      # CHF:  - PTA Entresto BID + IMDUR + Toprol XL.   - Cardiology is following.      # COPD. He has already quit smoking. Chronic hypoxic respiratory failure, baseline 4LNC.       Plan/   -HD on TTS schedule   Extra UF on 1/10  -renal dose meds  -follow cardiology input    Cristine Mckenzie MD  Office: [phone]  Fax:    [phone]  [place]

## 2025-01-09 NOTE — ED NOTES
Front desk spoke with patient regarding labs needed prior to appointment on Monday. I advised him to try to get to the labs drawn today as the outpatient labs could close tomorrow due to inclement weather. If he is unable to get them completed by Monday, appointment will need to be rescheduled. Patient voiced understanding.   Pt taken off BiPAP and placed on 4L n/c. Will continue to monitor.       Brandi Kasper RN  09/13/23 [id number]

## 2025-01-09 NOTE — PROGRESS NOTES
Stress Lab: Attempted to do stress test yesterday, Pt did not feel well enough, and could not lay flat for imaging (nauseated), sent back to room. Pt currently in ECHO, am assessment not completed yet. This RN spoke with Pt's staff nurse and will f/u when am assessment done. Noted BP low and Pt to get H/D today (cannot do stress test on same day as H/D). Noted wheezes in last assessment - cannot do lexiscan with wheezing. Noted Pts nondiagnostic / abnormal stress test 7/2024 / cath / stent, and that Pt is not a candidate for cardiac intervention - per progress note. Pt not yet ready for DC, will f/u for stress test when appropriate and on a non dialysis day. Will re assess after Staff RN able to assess Pt and f/u in am for possible stress test. Above discussed with [facility] RN, Drake and Staff RN, Mary Lou.

## 2025-01-09 NOTE — CARE COORDINATION
CM update; LOS # 2; Followed by IM, nephrology, Cardiology, Podiatry, Dialysis, Wound Care and Therapies. Patient LTC at [facility] no barriers to return, echo completed stress cannot be done on dialysis day so potential for tomorrow. Will follow.Belkis Mcdaniel RN

## 2025-01-09 NOTE — PROGRESS NOTES
[facility]   Daily Cardiovascular Progress Note    Admit Date: 1/7/2025    Chief complaint: Weakness, not feeling well  HPI:     Pt currently denies CP, SOB, dizziness or syncope.        Medications/Labs all Reviewed:  Patient Active Problem List   Diagnosis    Sepsis without acute organ dysfunction ([facility])    CHF (congestive heart failure) ([facility])    Chronic obstructive pulmonary disease ([facility])    Coronary artery disease involving native coronary artery of native heart    PVD (peripheral vascular disease) ([facility])    Bicuspid aortic valve    Bilateral hilar adenopathy syndrome    Claudication in peripheral vascular disease ([facility])    Hypertension    Diabetic neuropathy ([facility])    Type 2 DM with hypertension and ESRD on dialysis ([facility])    Passive smoke exposure    Depression with anxiety    Community acquired pneumonia of left lower lobe of lung    Obesity    ZAINAB on CPAP    Degeneration of lumbar or lumbosacral intervertebral disc    Lumbar radiculopathy    Lumbosacral spondylosis without myelopathy    Biliary colic    Symptomatic cholelithiasis    Gastroparesis due to DM ([facility])    Elevated troponin    Angina, class IV ([facility])    Dyspnea    Dyslipidemia    Chronic systolic (congestive) heart failure ([facility])    Ischemic cardiomyopathy    Tobacco abuse    CVA (cerebral vascular accident) ([facility])    History of CVA (cerebrovascular accident)    Type 2 diabetes mellitus without complication, without long-term current use of insulin ([facility])    ZAINAB treated with BiPAP    Pleural effusion    Chronic anemia    Nonrheumatic aortic valve stenosis    Mucus plugging of bronchi    Hemodialysis-associated hypotension    ESRD on dialysis ([facility])    Hypotension due to drugs    Acute diastolic CHF (congestive heart failure) ([facility])    Neuromuscular disorder ([facility])    Renovascular hypertension    Mixed hyperlipidemia    Cigarette nicotine dependence in remission    Pulmonary edema    Fluid overload    Anemia of chronic disease    SOB (shortness

## 2025-01-10 ENCOUNTER — APPOINTMENT (OUTPATIENT)
Dept: NUCLEAR MEDICINE | Age: 55
End: 2025-01-10
Attending: INTERNAL MEDICINE
Payer: MEDICARE

## 2025-01-10 ENCOUNTER — HOSPITAL ENCOUNTER (INPATIENT)
Age: 55
Discharge: HOME | End: 2025-01-12
Attending: INTERNAL MEDICINE
Payer: MEDICARE

## 2025-01-10 LAB
ECHO BSA: 2.23 M2
GLUCOSE BLD-MCNC: 267 MG/DL (ref 70–99)
GLUCOSE BLD-MCNC: 335 MG/DL (ref 70–99)
GLUCOSE BLD-MCNC: 368 MG/DL (ref 70–99)
NUC STRESS EJECTION FRACTION: 39 %
NUC STRESS LV EDV: 176 ML (ref 67–155)
NUC STRESS LV ESV: 107 ML (ref 22–58)
NUC STRESS LV MASS: 180 G
PERFORMED ON: ABNORMAL
STRESS BASELINE DIAS BP: 45 MMHG
STRESS BASELINE HR: 92 BPM
STRESS BASELINE SYS BP: 79 MMHG
STRESS ESTIMATED WORKLOAD: 1 METS
STRESS PEAK DIAS BP: 81 MMHG
STRESS PEAK SYS BP: 137 MMHG
STRESS PERCENT HR ACHIEVED: 66 %
STRESS POST PEAK HR: 109 BPM
STRESS RATE PRESSURE PRODUCT: ABNORMAL BPM*MMHG
STRESS TARGET HR: 164 BPM
TID: 0.89

## 2025-01-10 PROCEDURE — 93018 CV STRESS TEST I&R ONLY: CPT | Performed by: INTERNAL MEDICINE

## 2025-01-10 PROCEDURE — 90935 HEMODIALYSIS ONE EVALUATION: CPT

## 2025-01-10 PROCEDURE — 6370000000 HC RX 637 (ALT 250 FOR IP): Performed by: NURSE PRACTITIONER

## 2025-01-10 PROCEDURE — 94761 N-INVAS EAR/PLS OXIMETRY MLT: CPT

## 2025-01-10 PROCEDURE — 93017 CV STRESS TEST TRACING ONLY: CPT

## 2025-01-10 PROCEDURE — 3430000000 HC RX DIAGNOSTIC RADIOPHARMACEUTICAL: Performed by: INTERNAL MEDICINE

## 2025-01-10 PROCEDURE — 6360000002 HC RX W HCPCS: Performed by: INTERNAL MEDICINE

## 2025-01-10 PROCEDURE — 78452 HT MUSCLE IMAGE SPECT MULT: CPT | Performed by: INTERNAL MEDICINE

## 2025-01-10 PROCEDURE — 94640 AIRWAY INHALATION TREATMENT: CPT

## 2025-01-10 PROCEDURE — 6370000000 HC RX 637 (ALT 250 FOR IP): Performed by: INTERNAL MEDICINE

## 2025-01-10 PROCEDURE — 78452 HT MUSCLE IMAGE SPECT MULT: CPT

## 2025-01-10 PROCEDURE — 6360000002 HC RX W HCPCS

## 2025-01-10 PROCEDURE — A9502 TC99M TETROFOSMIN: HCPCS | Performed by: INTERNAL MEDICINE

## 2025-01-10 PROCEDURE — 99233 SBSQ HOSP IP/OBS HIGH 50: CPT | Performed by: NURSE PRACTITIONER

## 2025-01-10 PROCEDURE — 93016 CV STRESS TEST SUPVJ ONLY: CPT | Performed by: INTERNAL MEDICINE

## 2025-01-10 PROCEDURE — 2060000000 HC ICU INTERMEDIATE R&B

## 2025-01-10 PROCEDURE — 2700000000 HC OXYGEN THERAPY PER DAY

## 2025-01-10 PROCEDURE — 2500000003 HC RX 250 WO HCPCS: Performed by: INTERNAL MEDICINE

## 2025-01-10 RX ORDER — METOPROLOL SUCCINATE 25 MG/1
12.5 TABLET, EXTENDED RELEASE ORAL 2 TIMES DAILY
Status: DISCONTINUED | OUTPATIENT
Start: 2025-01-10 | End: 2025-01-11

## 2025-01-10 RX ORDER — HEPARIN SODIUM 1000 [USP'U]/ML
3600 INJECTION, SOLUTION INTRAVENOUS; SUBCUTANEOUS PRN
Status: DISCONTINUED | OUTPATIENT
Start: 2025-01-10 | End: 2025-01-16 | Stop reason: HOSPADM

## 2025-01-10 RX ORDER — ALBUMIN (HUMAN) 12.5 G/50ML
SOLUTION INTRAVENOUS
Status: DISCONTINUED
Start: 2025-01-10 | End: 2025-01-11

## 2025-01-10 RX ORDER — AMINOPHYLLINE 25 MG/ML
100 INJECTION, SOLUTION INTRAVENOUS ONCE
Status: COMPLETED | OUTPATIENT
Start: 2025-01-10 | End: 2025-01-10

## 2025-01-10 RX ORDER — DILTIAZEM HCL 60 MG
30 TABLET ORAL EVERY 8 HOURS PRN
Status: DISCONTINUED | OUTPATIENT
Start: 2025-01-10 | End: 2025-01-16

## 2025-01-10 RX ORDER — HEPARIN SODIUM 1000 [USP'U]/ML
INJECTION, SOLUTION INTRAVENOUS; SUBCUTANEOUS
Status: COMPLETED
Start: 2025-01-10 | End: 2025-01-10

## 2025-01-10 RX ADMIN — CALCIUM ACETATE 1334 MG: 667 CAPSULE ORAL at 20:25

## 2025-01-10 RX ADMIN — OXYCODONE 10 MG: 5 TABLET ORAL at 20:26

## 2025-01-10 RX ADMIN — INSULIN GLARGINE 15 UNITS: 100 INJECTION, SOLUTION SUBCUTANEOUS at 20:28

## 2025-01-10 RX ADMIN — ATORVASTATIN CALCIUM 80 MG: 80 TABLET, FILM COATED ORAL at 18:06

## 2025-01-10 RX ADMIN — REGADENOSON 0.4 MG: 0.08 INJECTION, SOLUTION INTRAVENOUS at 09:33

## 2025-01-10 RX ADMIN — METOPROLOL SUCCINATE 12.5 MG: 25 TABLET, FILM COATED, EXTENDED RELEASE ORAL at 20:26

## 2025-01-10 RX ADMIN — CLOPIDOGREL BISULFATE 75 MG: 75 TABLET ORAL at 18:06

## 2025-01-10 RX ADMIN — BUSPIRONE HYDROCHLORIDE 10 MG: 5 TABLET ORAL at 18:06

## 2025-01-10 RX ADMIN — APIXABAN 5 MG: 5 TABLET, FILM COATED ORAL at 20:26

## 2025-01-10 RX ADMIN — TETROFOSMIN 11.1 MILLICURIE: 1.38 INJECTION, POWDER, LYOPHILIZED, FOR SOLUTION INTRAVENOUS at 07:34

## 2025-01-10 RX ADMIN — ISOSORBIDE MONONITRATE 30 MG: 30 TABLET, EXTENDED RELEASE ORAL at 18:06

## 2025-01-10 RX ADMIN — SODIUM CHLORIDE, PRESERVATIVE FREE 10 ML: 5 INJECTION INTRAVENOUS at 18:08

## 2025-01-10 RX ADMIN — INSULIN LISPRO 4 UNITS: 100 INJECTION, SOLUTION INTRAVENOUS; SUBCUTANEOUS at 20:28

## 2025-01-10 RX ADMIN — PANTOPRAZOLE SODIUM 40 MG: 40 TABLET, DELAYED RELEASE ORAL at 18:06

## 2025-01-10 RX ADMIN — PREGABALIN 25 MG: 25 CAPSULE ORAL at 18:07

## 2025-01-10 RX ADMIN — HEPARIN SODIUM 3600 UNITS: 1000 INJECTION INTRAVENOUS; SUBCUTANEOUS at 16:57

## 2025-01-10 RX ADMIN — TETROFOSMIN 30.5 MILLICURIE: 1.38 INJECTION, POWDER, LYOPHILIZED, FOR SOLUTION INTRAVENOUS at 09:33

## 2025-01-10 RX ADMIN — CALCIUM ACETATE 1334 MG: 667 CAPSULE ORAL at 18:06

## 2025-01-10 RX ADMIN — INSULIN LISPRO 8 UNITS: 100 INJECTION, SOLUTION INTRAVENOUS; SUBCUTANEOUS at 18:07

## 2025-01-10 RX ADMIN — IPRATROPIUM BROMIDE AND ALBUTEROL SULFATE 1 DOSE: 2.5; .5 SOLUTION RESPIRATORY (INHALATION) at 06:23

## 2025-01-10 RX ADMIN — BUSPIRONE HYDROCHLORIDE 10 MG: 5 TABLET ORAL at 20:26

## 2025-01-10 RX ADMIN — SODIUM CHLORIDE, PRESERVATIVE FREE 10 ML: 5 INJECTION INTRAVENOUS at 20:27

## 2025-01-10 RX ADMIN — AMINOPHYLLINE 100 MG: 25 INJECTION, SOLUTION INTRAVENOUS at 10:11

## 2025-01-10 RX ADMIN — DULOXETINE HYDROCHLORIDE 60 MG: 60 CAPSULE, DELAYED RELEASE ORAL at 18:06

## 2025-01-10 ASSESSMENT — PAIN SCALES - GENERAL
PAINLEVEL_OUTOF10: 7
PAINLEVEL_OUTOF10: 7

## 2025-01-10 NOTE — PROGRESS NOTES
Nephrology Progress Note   [email or web address]rib[facility]      Sub/interval history  C/o sob  UF session on 1/10    HD on 1/9 w UF goal increased to 4 L  but he was not able to tolerate dt hypotension and only 2.6l net UF obtained ' pre wt 107 kg    ROS: No chest pain/shortness of breath/fever/nausea/vomiting  PSFH: No visitor    Scheduled Meds:   metoprolol succinate  12.5 mg Oral BID    apixaban  5 mg Oral BID    atorvastatin  80 mg Oral Daily    busPIRone  10 mg Oral TID    calcium acetate  2 capsule Oral TID    clopidogrel  75 mg Oral Daily    DULoxetine  60 mg Oral Daily    insulin glargine  15 Units SubCUTAneous Nightly    isosorbide mononitrate  30 mg Oral Daily    pantoprazole  40 mg Oral Daily    pregabalin  25 mg Oral Daily    [Held by provider] sacubitril-valsartan  1 tablet Oral BID    sodium chloride flush  5-40 mL IntraVENous 2 times per day    insulin lispro  0-8 Units SubCUTAneous 4x Daily AC & HS     Continuous Infusions:   sodium chloride      dextrose       PRN Meds:.dilTIAZem, ipratropium 0.5 mg-albuterol 2.5 mg, midodrine, oxyCODONE, sodium chloride flush, sodium chloride, polyethylene glycol, acetaminophen **OR** acetaminophen, prochlorperazine, glucose, dextrose bolus **OR** dextrose bolus, glucagon (rDNA), dextrose    Objective/     Vitals:    01/10/25 0506 01/10/25 0600 01/10/25 0623 01/10/25 0723   BP:    (!) 110/58   Pulse:    (!) 108   Resp:    18   Temp:    97.9 °F (36.6 °C)   TempSrc:       SpO2:   95% 93%   Weight: 111.5 kg (245 lb 13 oz) 111.5 kg (245 lb 13 oz)     Height:         24HR INTAKE/OUTPUT:    Intake/Output Summary (Last 24 hours) at 1/10/2025 1229  Last data filed at 1/10/2025 0506  Gross per 24 hour   Intake 480 ml   Output 0 ml   Net 480 ml     Gen: alert, awake  Neck: No JVD  Skin: Unremarkable  Cardiovascular:  S1, S2 without m/r/g   Respiratory: CTA B without w/r/r; respiratory effort normal  Abdomen:  soft, nt, nd,   Extremities: no lower extremity edema  Neuro/Psy: AAoriented  times 3 ; moves all 4 ext    Data/  Recent Labs     01/09/25  0504   WBC 8.5   HGB 9.2*   HCT 28.6*   MCV 85.0        Recent Labs     01/09/25  0504   *   K 4.8   CL 87*   CO2 24   GLUCOSE 122*   BUN 82*   CREATININE 8.9*   LABGLOM 6*       Assessment/   # ESRD on HD:    - Patient is on dialysis TTS at [facility] with a working L IJ TDC.    - Patient with longstanding high interdialytic weight gains.  -  kg.    # Chest Pain:  - H/o complex PCI, CAD s/p stents, TAVR.   - Per Cardiology.       # Hyperkalemia: improved recently.      # Anemia of chronic disease:    - Hgb is near targets.   - Getting EPO with dialysis     # DM2 with foot ulcers:   - Podiatry was consulted.       # Afib with RVR:  - S/p Dilt gtt.  Now on Dilt PO.      # CHF:  - PTA Entresto BID + IMDUR + Toprol XL.   - Cardiology is following.      # COPD. He has already quit smoking. Chronic hypoxic respiratory failure, baseline 4LNC.       Plan/   -HD on TTS schedule   Extra UF on 1/10  -renal dose meds  -follow cardiology input    Cristine Mckenzie MD  Office: [phone]  Fax:    [phone]  [employer]

## 2025-01-10 NOTE — PLAN OF CARE
Problem: Safety - Adult  Goal: Free from fall injury  1/10/2025 0134 by Jackie Merchant RN  Outcome: Progressing  Note: Pt will remain free from falls throughout hospital stay. Fall precautions in place, bed alarm on, bed in lowest position with wheels locked and side rails 2/4 up. Will continue to monitor throughout shift.      Problem: ABCDS Injury Assessment  Goal: Absence of physical injury  1/10/2025 0134 by Jackie Merchant RN  Outcome: Progressing     Problem: Pain  Goal: Verbalizes/displays adequate comfort level or baseline comfort level  1/10/2025 0134 by Jackie Merchant RN  Outcome: Progressing  Note: Pt will be satisfied with pain control. Pt uses numeric pain rating scale with reassessments after pain med administration. Will continue to monitor progression throughout shift.      Problem: Chronic Conditions and Co-morbidities  Goal: Patient's chronic conditions and co-morbidity symptoms are monitored and maintained or improved  1/10/2025 0134 by Jackie Merchant RN  Outcome: Progressing  Note:   CHF Care Plan      Patient's EF (Ejection Fraction) is less than 40%    Heart Failure Medications:  Diuretics:: None    (One of the following REQUIRED for EF </= 40%/SYSTOLIC FAILURE but MAY be used in EF% >40%/DIASTOLIC FAILURE)        ACE:: None        ARB:: None         ARNI:: None    (Beta Blockers)  NON- Evidenced Based Beta Blocker (for EF% >40%/DIASTOLIC FAILURE): None    Evidenced Based Beta Blocker::(REQUIRED for EF% <40%/SYSTOLIC FAILURE) Metoprolol SUCCinate- Toprol XL  ...................................................................................................................................................    Failed to redirect to the Timeline version of the [employer] SmartLink.      Patient's weights and intake/output reviewed    Daily Weight log at bedside, patient/family participation in use of log: \"yes    Patient's current weight today shows a difference of 11 lbs less than last documented

## 2025-01-10 NOTE — DIALYSIS
Treatment time: 3 hours uf only  Net UF: 2000 ml     Pre weight: 111.5 kg  Post weight:109.5 kg  EDW: tbd kg    Access used: R TDC    Access function: good with  ml/min     Medications or blood products given: none     Regular outpatient schedule: MWF     Summary of response to treatment: Patient tolerated treatment well and without any complications. Patient remained stable throughout entire treatment and upon the exiting the dialysis suite via transport. Pt intermittently confused throughout treament. Primary nurse made aware.     Report given to Ginny Kilgore RN and copy of dialysis treatment record placed in chart, to be scanned into EMR.

## 2025-01-10 NOTE — PROGRESS NOTES
[facility]   Daily Progress Note    Admit Date:  1/7/2025  HPI:   Igor Ann [facility] with [facility] pain and not feeling well. Transferred to [facility] for AFib with RVR and elevated troponin    Cardiology consulted for chest pain, elevated troponin, AFib RVR    PMH: CAD/PATRICIA to LAD in 2015,PATRICIA to ramus, PATRICIA to LCX and PATRICIA to RCA previously, Aortic stenosis and S/P TAVR in 10/2019, HFrEF, ischemic cardiomyopathy, HTN, PAF, PVD (PTA and stent to REIA on 5/2019), ESRD, CVA, DM, ZAINAB, COPD on home O2 and noncompliance admitted for planned PCI. He was admitted in July 2024 with chest pain and LHC showed MV disease and felt to be better suited for PCI of RCA and medical management of residual CAD. Echo showed LVEF 35-40%. Evaluated by CTS and deemed not a candidate for CABG d/t excessive risk. On 10/21/24 he underwent laser atherectomy, shockwave coronary lithotripsy and drug coated balloon angioplasty of RCA. He was noted to have borderline significant disease in LAD with stent protrusion Dg 1 into LAD. Treat medically at this point and consider follow up ischemia assessment/PCI pending recovery.     Subjective:  Mr. Ann seen in stress lab.  Continues to have chest pain that comes and goes, more so with exertion.  He states he does ambulate though usually slow.  He also notes frequent wheezing and no longer has inhalers.     Objective:   Patient Vitals for the past 24 hrs:   BP Temp Temp src Pulse Resp SpO2 Weight   01/10/25 0723 (!) 110/58 97.9 °F (36.6 °C) -- (!) 108 18 93 % --   01/10/25 0623 -- -- -- -- -- 95 % --   01/10/25 0600 -- -- -- -- -- -- 111.5 kg (245 lb 13 oz)   01/10/25 0506 -- -- -- -- -- -- 111.5 kg (245 lb 13 oz)   01/10/25 0325 (!) 99/55 98.2 °F (36.8 °C) Axillary 76 18 96 % --   01/10/25 0001 (!) 116/96 98.2 °F (36.8 °C) Oral 88 18 99 % --   01/09/25 2246 -- -- -- -- 19 -- --   01/09/25 2117 95/72 97.3 °F (36.3 °C) Oral 60 19 97 % --   01/09/25 1440 (!) 123/95 97.9 °F (36.6 °C)  that was calcific.  We treated this ramus intermediate branch initially with a 3.5 mm x 15 mm noncompliant Euphora balloon and subsequent stented it with a 3.5 mm x 18 mm [place] frontier drug-eluting stent.  The proximal stent appeared to be mildly under deployed we therefore then utilized a 4.0 mm x 8 mm noncompliant Euphora balloon for further treatment.  At this point we did moved our attention to the circumflex.  We had placed a second BMW wire into the LAD to protect that during this PCI.  We attempted to utilize the BMW wire into the circumflex but this would not advance.  We changed out to a run-through wire and were able to advance into the circumflex.  We did a vascular ultrasound of the circumflex.  We also measured probable vessel size and plaque characteristics.  We utilized a 3.5 x 18 mm noncompliant Euphora balloon and then subsequently stented the circumflex with a 3.5 mm x 22 mm [place] frontier drug-eluting stent.  Post angiographic views demonstrated excellent results and SCARLETT-3 flow through the vessel.  We next changed out for a Heidy JR4 catheter.  This was utilized for the right coronary.  The run-through wire was advanced into the distal right coronary artery.  Intravascular sound was completed of the right coronary artery which demonstrated significant in-stent restenosis in the previously placed stents in the mid right coronary artery.  There was also diffuse atherosclerotic disease that was moderately calcific in the proximal segment.  We did appropriate vessel size measurement we started initially with a 4.0 mm x 15 mm score flex cutting balloon and we did that several times into the mid and proximal right coronary artery with subsequent then did a drug-eluting stent to the right coronary artery a 4.0 mm x 22 mm [place] frontier and then postdilated this with a 4.5 mm x 15 mm noncompliant balloon to rated burst pressure.  Findings:  1. Left main coronary artery was normal. It gave off the left

## 2025-01-10 NOTE — CARE COORDINATION
CM update; LOS # 3. Followed by IM, Nephrology, Cardiology. Patient completed all cardiac testing during stay and will be followed by cardiology. Nephrology completed dialysis.Patient can return to [facility] with no barriers, Will follow.Belkis Mcdaniel RN

## 2025-01-10 NOTE — PROGRESS NOTES
(60 gm/meal); Low Sodium (2 gm); Low Potassium (Less than 3000 mg/day); 2000 ml    Medications:        Infusion Medications    sodium chloride      dextrose       Scheduled Medications    metoprolol succinate  12.5 mg Oral BID    apixaban  5 mg Oral BID    atorvastatin  80 mg Oral Daily    busPIRone  10 mg Oral TID    calcium acetate  2 capsule Oral TID    clopidogrel  75 mg Oral Daily    DULoxetine  60 mg Oral Daily    insulin glargine  15 Units SubCUTAneous Nightly    isosorbide mononitrate  30 mg Oral Daily    pantoprazole  40 mg Oral Daily    pregabalin  25 mg Oral Daily    [Held by provider] sacubitril-valsartan  1 tablet Oral BID    sodium chloride flush  5-40 mL IntraVENous 2 times per day    insulin lispro  0-8 Units SubCUTAneous 4x Daily AC & HS     PRN Meds: dilTIAZem, ipratropium 0.5 mg-albuterol 2.5 mg, midodrine, oxyCODONE, sodium chloride flush, sodium chloride, polyethylene glycol, acetaminophen **OR** acetaminophen, prochlorperazine, glucose, dextrose bolus **OR** dextrose bolus, glucagon (rDNA), dextrose    BP (!) 110/58   Pulse (!) 108   Temp 97.9 °F (36.6 °C)   Resp 18   Ht 1.753 m (5' 9.02\")   Wt 111.5 kg (245 lb 13 oz)   SpO2 93%   BMI 36.28 kg/m²     Telemetry:      Personally reviewed and interpreted telemetry (Rhythm Strip) on 1/10/2025 with the following findings:     Diet: ADULT DIET; Regular; 4 carb choices (60 gm/meal); Low Sodium (2 gm); Low Potassium (Less than 3000 mg/day); 2000 ml    DVT Prophylaxis: []PPx LMWH  []SQ Heparin  []IPC/SCDs  []Eliquis  []Xarelto  []Coumadin  [] Heparin Drip  []Other -      Code status: Full Code    PT/OT Eval Status:   []NOT yet ordered  []Ordered and Pending   []Seen with Recommendations for:   []Home independently  []Home w/ assist  []HHC  []SNF  []Acute Rehab    Multi-Disciplinary Rounds with Case Management completed on 1/10/2025 with the following recommendations:  Anticipated Discharge Location: []Home w/ []HHC vs []SNF  []Acute Rehab  []LTAC    *   K 4.8   CL 87*   CO2 24   BUN 82*   CREATININE 8.9*   CALCIUM 8.7     No results for input(s): \"PROBNP\", \"TROPHS\" in the last 72 hours.    Recent Labs     01/09/25  0504   LABA1C 7.1     No results for input(s): \"AST\", \"ALT\", \"BILIDIR\", \"BILITOT\", \"ALKPHOS\" in the last 72 hours.    No results for input(s): \"INR\", \"LACTA\", \"TSH\" in the last 72 hours.    Urine Cultures:   Lab Results   Component Value Date/Time    LABURIN  05/23/2024 08:00 PM     <50,000 CFU/ml mixed skin/urogenital mauri. No further workup     Blood Cultures:   Lab Results   Component Value Date/Time    BC No Growth after 4 days of incubation. 05/02/2024 04:55 PM     Lab Results   Component Value Date/Time    BLOODCULT2 No Growth after 4 days of incubation. 05/02/2024 05:16 PM     Organism:   Lab Results   Component Value Date/Time    ORG Staphylococcus epidermidis 05/02/2024 01:23 PM         EDVIN HOWE MD        V 10.25    Date of Admission: 1/7/2025    Hospital Day: 4      No chief complaint on file.        Current Principal Problem:  Atrial fibrillation with rapid ventricular response (HCC)      Consults:      IP CONSULT TO NEPHROLOGY  IP CONSULT TO PODIATRY

## 2025-01-11 LAB
ANION GAP SERPL CALCULATED.3IONS-SCNC: 23 MMOL/L (ref 3–16)
BUN SERPL-MCNC: 60 MG/DL (ref 7–20)
CALCIUM SERPL-MCNC: 9.3 MG/DL (ref 8.3–10.6)
CHLORIDE SERPL-SCNC: 84 MMOL/L (ref 99–110)
CO2 SERPL-SCNC: 22 MMOL/L (ref 21–32)
CREAT SERPL-MCNC: 7.2 MG/DL (ref 0.9–1.3)
DEPRECATED RDW RBC AUTO: 19.6 % (ref 12.4–15.4)
GFR SERPLBLD CREATININE-BSD FMLA CKD-EPI: 8 ML/MIN/{1.73_M2}
GLUCOSE BLD-MCNC: 129 MG/DL (ref 70–99)
GLUCOSE BLD-MCNC: 168 MG/DL (ref 70–99)
GLUCOSE BLD-MCNC: 189 MG/DL (ref 70–99)
GLUCOSE BLD-MCNC: 226 MG/DL (ref 70–99)
GLUCOSE SERPL-MCNC: 176 MG/DL (ref 70–99)
HCT VFR BLD AUTO: 31.1 % (ref 40.5–52.5)
HGB BLD-MCNC: 9.6 G/DL (ref 13.5–17.5)
MCH RBC QN AUTO: 26.7 PG (ref 26–34)
MCHC RBC AUTO-ENTMCNC: 30.9 G/DL (ref 31–36)
MCV RBC AUTO: 86.1 FL (ref 80–100)
PERFORMED ON: ABNORMAL
PLATELET # BLD AUTO: 214 K/UL (ref 135–450)
PMV BLD AUTO: 9.9 FL (ref 5–10.5)
POTASSIUM SERPL-SCNC: 4.8 MMOL/L (ref 3.5–5.1)
RBC # BLD AUTO: 3.61 M/UL (ref 4.2–5.9)
SODIUM SERPL-SCNC: 129 MMOL/L (ref 136–145)
WBC # BLD AUTO: 12 K/UL (ref 4–11)

## 2025-01-11 PROCEDURE — 99232 SBSQ HOSP IP/OBS MODERATE 35: CPT | Performed by: NURSE PRACTITIONER

## 2025-01-11 PROCEDURE — 6370000000 HC RX 637 (ALT 250 FOR IP): Performed by: NURSE PRACTITIONER

## 2025-01-11 PROCEDURE — 90935 HEMODIALYSIS ONE EVALUATION: CPT

## 2025-01-11 PROCEDURE — 6360000002 HC RX W HCPCS

## 2025-01-11 PROCEDURE — 85027 COMPLETE CBC AUTOMATED: CPT

## 2025-01-11 PROCEDURE — 2500000003 HC RX 250 WO HCPCS: Performed by: INTERNAL MEDICINE

## 2025-01-11 PROCEDURE — 6370000000 HC RX 637 (ALT 250 FOR IP): Performed by: INTERNAL MEDICINE

## 2025-01-11 PROCEDURE — 6370000000 HC RX 637 (ALT 250 FOR IP): Performed by: STUDENT IN AN ORGANIZED HEALTH CARE EDUCATION/TRAINING PROGRAM

## 2025-01-11 PROCEDURE — 6370000000 HC RX 637 (ALT 250 FOR IP)

## 2025-01-11 PROCEDURE — 2060000000 HC ICU INTERMEDIATE R&B

## 2025-01-11 PROCEDURE — 80048 BASIC METABOLIC PNL TOTAL CA: CPT

## 2025-01-11 PROCEDURE — 36415 COLL VENOUS BLD VENIPUNCTURE: CPT

## 2025-01-11 RX ORDER — INSULIN GLARGINE 100 [IU]/ML
20 INJECTION, SOLUTION SUBCUTANEOUS NIGHTLY
Status: DISCONTINUED | OUTPATIENT
Start: 2025-01-11 | End: 2025-01-14

## 2025-01-11 RX ORDER — RANOLAZINE 500 MG/1
500 TABLET, EXTENDED RELEASE ORAL 2 TIMES DAILY
Status: DISCONTINUED | OUTPATIENT
Start: 2025-01-11 | End: 2025-01-16

## 2025-01-11 RX ORDER — MIDODRINE HYDROCHLORIDE 5 MG/1
TABLET ORAL
Status: COMPLETED
Start: 2025-01-11 | End: 2025-01-11

## 2025-01-11 RX ORDER — METOPROLOL SUCCINATE 25 MG/1
12.5 TABLET, EXTENDED RELEASE ORAL 2 TIMES DAILY
Status: DISCONTINUED | OUTPATIENT
Start: 2025-01-11 | End: 2025-01-12

## 2025-01-11 RX ORDER — HEPARIN SODIUM 1000 [USP'U]/ML
INJECTION, SOLUTION INTRAVENOUS; SUBCUTANEOUS
Status: COMPLETED
Start: 2025-01-11 | End: 2025-01-11

## 2025-01-11 RX ADMIN — PREGABALIN 25 MG: 25 CAPSULE ORAL at 09:48

## 2025-01-11 RX ADMIN — DILTIAZEM HYDROCHLORIDE 30 MG: 60 TABLET ORAL at 04:54

## 2025-01-11 RX ADMIN — APIXABAN 5 MG: 5 TABLET, FILM COATED ORAL at 20:27

## 2025-01-11 RX ADMIN — HEPARIN SODIUM 3600 UNITS: 1000 INJECTION INTRAVENOUS; SUBCUTANEOUS at 16:08

## 2025-01-11 RX ADMIN — INSULIN GLARGINE 20 UNITS: 100 INJECTION, SOLUTION SUBCUTANEOUS at 20:26

## 2025-01-11 RX ADMIN — METOPROLOL SUCCINATE 12.5 MG: 25 TABLET, FILM COATED, EXTENDED RELEASE ORAL at 20:27

## 2025-01-11 RX ADMIN — ISOSORBIDE MONONITRATE 30 MG: 30 TABLET, EXTENDED RELEASE ORAL at 09:48

## 2025-01-11 RX ADMIN — OXYCODONE 10 MG: 5 TABLET ORAL at 09:48

## 2025-01-11 RX ADMIN — CALCIUM ACETATE 1334 MG: 667 CAPSULE ORAL at 20:31

## 2025-01-11 RX ADMIN — RANOLAZINE 500 MG: 500 TABLET, EXTENDED RELEASE ORAL at 20:27

## 2025-01-11 RX ADMIN — APIXABAN 5 MG: 5 TABLET, FILM COATED ORAL at 09:48

## 2025-01-11 RX ADMIN — METOPROLOL SUCCINATE 12.5 MG: 25 TABLET, FILM COATED, EXTENDED RELEASE ORAL at 06:17

## 2025-01-11 RX ADMIN — INSULIN LISPRO 2 UNITS: 100 INJECTION, SOLUTION INTRAVENOUS; SUBCUTANEOUS at 09:47

## 2025-01-11 RX ADMIN — CALCIUM ACETATE 1334 MG: 667 CAPSULE ORAL at 09:53

## 2025-01-11 RX ADMIN — PANTOPRAZOLE SODIUM 40 MG: 40 TABLET, DELAYED RELEASE ORAL at 09:48

## 2025-01-11 RX ADMIN — BUSPIRONE HYDROCHLORIDE 10 MG: 5 TABLET ORAL at 20:26

## 2025-01-11 RX ADMIN — CLOPIDOGREL BISULFATE 75 MG: 75 TABLET ORAL at 09:48

## 2025-01-11 RX ADMIN — MIDODRINE HYDROCHLORIDE 10 MG: 5 TABLET ORAL at 14:04

## 2025-01-11 RX ADMIN — ATORVASTATIN CALCIUM 80 MG: 80 TABLET, FILM COATED ORAL at 09:48

## 2025-01-11 RX ADMIN — DULOXETINE HYDROCHLORIDE 60 MG: 60 CAPSULE, DELAYED RELEASE ORAL at 09:48

## 2025-01-11 RX ADMIN — BUSPIRONE HYDROCHLORIDE 10 MG: 5 TABLET ORAL at 09:48

## 2025-01-11 RX ADMIN — SODIUM CHLORIDE, PRESERVATIVE FREE 10 ML: 5 INJECTION INTRAVENOUS at 20:27

## 2025-01-11 ASSESSMENT — PAIN SCALES - GENERAL
PAINLEVEL_OUTOF10: 0
PAINLEVEL_OUTOF10: 0
PAINLEVEL_OUTOF10: 8

## 2025-01-11 ASSESSMENT — PAIN DESCRIPTION - LOCATION: LOCATION: BACK

## 2025-01-11 NOTE — PROGRESS NOTES
Nephrology Progress Note   [facility]      Sub/interval history  The patient was seen and examined during dialysis; he feels well today with no CP, SOB, nausea or vomiting.    ROS: No fever or chills.  Social: No family at bedside.    Scheduled Meds:   metoprolol succinate  12.5 mg Oral BID    ranolazine  500 mg Oral BID    apixaban  5 mg Oral BID    atorvastatin  80 mg Oral Daily    busPIRone  10 mg Oral TID    calcium acetate  2 capsule Oral TID    clopidogrel  75 mg Oral Daily    DULoxetine  60 mg Oral Daily    insulin glargine  15 Units SubCUTAneous Nightly    isosorbide mononitrate  30 mg Oral Daily    pantoprazole  40 mg Oral Daily    pregabalin  25 mg Oral Daily    [Held by provider] sacubitril-valsartan  1 tablet Oral BID    sodium chloride flush  5-40 mL IntraVENous 2 times per day    insulin lispro  0-8 Units SubCUTAneous 4x Daily AC & HS     Continuous Infusions:   sodium chloride      dextrose       PRN Meds:.dilTIAZem, heparin (porcine), ipratropium 0.5 mg-albuterol 2.5 mg, midodrine, oxyCODONE, sodium chloride flush, sodium chloride, polyethylene glycol, acetaminophen **OR** acetaminophen, prochlorperazine, glucose, dextrose bolus **OR** dextrose bolus, glucagon (rDNA), dextrose    Objective/     Vitals:    01/11/25 0556 01/11/25 0617 01/11/25 0815 01/11/25 1124   BP:  (!) 119/100 128/88 (!) 106/92   Pulse: (!) 136 (!) 139 62 85   Resp:   22 22   Temp:   97.9 °F (36.6 °C) 97.9 °F (36.6 °C)   TempSrc:   Oral Oral   SpO2:   94% 98%   Weight:       Height:         24HR INTAKE/OUTPUT:    Intake/Output Summary (Last 24 hours) at 1/11/2025 1137  Last data filed at 1/11/2025 1001  Gross per 24 hour   Intake 470 ml   Output 0 ml   Net 470 ml     Gen: alert, awake  Neck: No JVD  Skin: Unremarkable  Cardiovascular:  S1, S2 without m/r/g   Respiratory: CTA B without w/r/r; respiratory effort normal  Abdomen:  soft, nt, nd,   Extremities: no lower extremity edema  Neuro/Psy: AAoriented times 3 ; moves

## 2025-01-11 NOTE — PLAN OF CARE
Problem: Safety - Adult  Goal: Free from fall injury  Outcome: Progressing  Note: Pt will remain free from falls throughout hospital stay. Fall precautions in place, bed alarm on, bed in lowest position with wheels locked and side rails 2/4 up. Will continue to monitor throughout shift.      Problem: ABCDS Injury Assessment  Goal: Absence of physical injury  Outcome: Progressing     Problem: Pain  Goal: Verbalizes/displays adequate comfort level or baseline comfort level  Outcome: Progressing  Note: Pt will be satisfied with pain control. Pt uses numeric pain rating scale with reassessments after pain med administration. Will continue to monitor progression throughout shift.      Problem: Chronic Conditions and Co-morbidities  Goal: Patient's chronic conditions and co-morbidity symptoms are monitored and maintained or improved  Outcome: Progressing  Note:   CHF Care Plan      Patient's EF (Ejection Fraction) is less than 40%    Heart Failure Medications:  Diuretics:: None    (One of the following REQUIRED for EF </= 40%/SYSTOLIC FAILURE but MAY be used in EF% >40%/DIASTOLIC FAILURE)        ACE:: None        ARB:: None         ARNI:: None    (Beta Blockers)  NON- Evidenced Based Beta Blocker (for EF% >40%/DIASTOLIC FAILURE): None    Evidenced Based Beta Blocker::(REQUIRED for EF% <40%/SYSTOLIC FAILURE) Metoprolol SUCCinate- Toprol XL  ...................................................................................................................................................    Failed to redirect to the Timeline version of the [employer] SmartLink.      Patient's weights and intake/output reviewed    Daily Weight log at bedside, patient/family participation in use of log: \"yes    Patient's current weight today shows a difference of 0 lbs less than last documented weight.      Intake/Output Summary (Last 24 hours) at 1/11/2025 0042  Last data filed at 1/10/2025 7740  Gross per 24 hour   Intake 150 ml   Output 0 ml    Net 150 ml       Education Booklet Provided: yes    Comorbidities Reviewed Yes    Patient has a past medical history of Ambulatory dysfunction, Aortic stenosis, Arthritis, Asthma, Bilateral hilar adenopathy syndrome, CAD (coronary artery disease), Cardiomyopathy ([facility]), CHF, EF 35-40% (July 2024), Chronic pain, COPD (chronic obstructive pulmonary disease) ([facility]), CVA (cerebral vascular accident) ([facility]), Depression, Diabetes mellitus ([facility]), Difficult intravenous access, Emphysema of lung ([facility]), ESRD (end stage renal disease) on dialysis ([facility]), Fear of needles, Gastric ulcer, GERD (gastroesophageal reflux disease), HD, T/R/Sa, Heart valve problem, History of spinal fracture, History of transcatheter aortic valve replacement (TAVR), Hx of blood clots, Hyperlipidemia, Hypertension, MI (myocardial infarction) ([facility]), Neuromuscular disorder ([facility]), Numbness and tingling in left arm, Pneumonia, PONV (postoperative nausea and vomiting), Prolonged emergence from general anesthesia, Sleep apnea, Stroke ([facility]), TIA (transient ischemic attack), and Unspecified diseases of blood and blood-forming organs.     >>For CHF and Comorbidity documentation on Education Time and Topics, please see Education Tab      CHF Education    Learners:  Patient  Readineess:   Acceptance  Method:   Explanation  Response:    Verbalizes Understanding and Demonstrated Understanding    Comments:     Time Spent:       Pt resting in bed at this time on  4 L O2. Pt with complaints of shortness of breath. Pt without lower extremity edema.     Patient and/or Family's stated Goal of Care this Admission: reduce shortness of breath, increase activity tolerance, better understand heart failure and disease management, be more comfortable, and reduce lower extremity edema prior to discharge        :

## 2025-01-11 NOTE — PROGRESS NOTES
Treatment time: 3 hours  Net UF: 2000 ml     Pre weight: 103.2 kg  Post weight:101.2 kg  EDW: 100 kg    Access used: L TDC    Access function: well with  ml/min     Medications or blood products given: heparin dwells and midodrine     Regular outpatient schedule: TTS     Summary of response to treatment: Was able to remove only 2L due to low blood pressure. Patient stable upon exiting the dialysis suite via transport.     Copy of dialysis treatment record placed in chart, to be scanned into EMR.

## 2025-01-11 NOTE — PROGRESS NOTES
Hospital Medicine Progress Note      Subjective:  He is feeling OK today.  No chest pain.      Stress abnormal but perhaps as good as it is going to get.    RVR since 10 PM.  BP was 80/40's this afternoon.      General appearance: No apparent distress, appears stated age.  HEENT: Pupils equal, round.  Conjunctivae/corneas clear.  Neck: No jugular venous distention.   Respiratory:  Normal respiratory effort.  Bilaterally without Wheezes/Rhonchi.  Bibasilar rales.    Cardiovascular: Normal rate and irregular rhythm with normal S1/S2 without murmurs, rubs or gallops.  Abdomen: Soft, non-tender, non-distended with normal bowel sounds.  Musculoskeletal: No cyanosis bilaterally.  No edema peripherally.  Partial R foot amputation.    Skin: No jaundice.  No rashes or lesions.  Neurologic:  Neurovascularly intact without any focal sensory/motor deficits. Cranial nerves: II-XII intact, grossly non-focal.  Psychiatric: Alert and oriented, normal insight.      Presenting Admission History:  The patient is a pleasant 54 Y M with a h/o COPD on 4LNC, HTN, DM2 with partial R foot amputation, multivessel CAD deemed not a candidate for CABG s/p stents (most recently in 10/2024), ischemic cardiomyopathy with EF 35-40%, CHF, Afib, CVA, bicuspid AS s/p 2019 TAVR, PAD s/p R iliac stent, and ESRD on HD.  It looks like he was admitted 7x last year, often for chest pain and/or CHF (sometimes misses HD).  He lives at [facility].  The patient presented to [facility] on [facility] with chest pain and dyspnea.  They saw pulmonary edema and bilateral pleural effusions on his CXR and nephrology was consulted for HD.  Cardiology was consulted because of the elevated troponin and recommended transfer to [facility] for interventional cardiology eval.  Upon arrival here the patient was breathing easily but did report ongoing dyspnea above baseline, worse orthopnea, worse edema in his abdomen.  No fevers or chills.  No further chest pain.  Discussed management of the case with           Labs:  Personally reviewed on 1/11/2025 and interpreted for clinical significance as documented above.     Recent Labs     01/09/25  0504   WBC 8.5   HGB 9.2*   HCT 28.6*        Recent Labs     01/09/25  0504 01/11/25  0449   * 129*   K 4.8 4.8   CL 87* 84*   CO2 24 22   BUN 82* 60*   CREATININE 8.9* 7.2*   CALCIUM 8.7 9.3     No results for input(s): \"PROBNP\", \"TROPHS\" in the last 72 hours.    Recent Labs     01/09/25  0504   LABA1C 7.1     No results for input(s): \"AST\", \"ALT\", \"BILIDIR\", \"BILITOT\", \"ALKPHOS\" in the last 72 hours.    No results for input(s): \"INR\", \"LACTA\", \"TSH\" in the last 72 hours.    Urine Cultures:   Lab Results   Component Value Date/Time    LABURIN  05/23/2024 08:00 PM     <50,000 CFU/ml mixed skin/urogenital mauri. No further workup     Blood Cultures:   Lab Results   Component Value Date/Time    BC No Growth after 4 days of incubation. 05/02/2024 04:55 PM     Lab Results   Component Value Date/Time    BLOODCULT2 No Growth after 4 days of incubation. 05/02/2024 05:16 PM     Organism:   Lab Results   Component Value Date/Time    ORG Staphylococcus epidermidis 05/02/2024 01:23 PM         EDVIN HOWE MD        V 10.25    Date of Admission: 1/7/2025    Hospital Day: 5      No chief complaint on file.        Current Principal Problem:  Atrial fibrillation with rapid ventricular response (HCC)      Consults:      IP CONSULT TO NEPHROLOGY  IP CONSULT TO PODIATRY

## 2025-01-11 NOTE — PROGRESS NOTES
[facility]   Daily Progress Note      Admit Date:  1/7/2025    Reason for follow up visit: Chest pain; atrial fib with RVR and elevated troponin    CC: \"I'm fine.\"    55 y/o male with PMH notable for  CAD/PATRICIA to LAD in 2015,PATRICIA to ramus, PATRICIA to LCX and PATRICIA to RCA previously/laser atherectomy and shockwave coronary lithotripsy and drug coated balloon to RCA with borderline significant disease in LAD with stent protrusion Dg1 into LAD on 10/21/24,  Aortic stenosis and S/P TAVR in 10/2019, HFrEF, ischemic cardiomyopathy, HTN, PAF, PVD (PTA and stent to REIA on 5/2019), ESRD, CVA, DM, ZAINAB, COPD on home O2 and noncompliance who was admitted to [facility] with chest pain. Elevated troponin and atrial fib with RVR.    Interval History:  Pt. seen and examined; records reviewed  Denies chest pain this AM  Resting comfortably and without complaints  Remains on O2 @ 4L  BP labile and hypotensive at times last 2 days.  Wt today 227# (review of weights noted: ? Accuracy)  Continues with dialysis  Denies dizziness, palpitations or cough  + wheezing    Subjective:  Pt with no acute overnight cardiac events.   Objective:   /62   Pulse 97   Temp 97.9 °F (36.6 °C) (Oral)   Resp 22   Ht 1.753 m (5' 9.02\")   Wt 109.6 kg (241 lb 10 oz)   SpO2 98%   BMI 35.67 kg/m²     Intake/Output Summary (Last 24 hours) at 1/11/2025 1407  Last data filed at 1/11/2025 1001  Gross per 24 hour   Intake 470 ml   Output 0 ml   Net 470 ml     Wt Readings from Last 3 Encounters:   01/11/25 109.6 kg (241 lb 10 oz)   01/06/25 99.8 kg (220 lb)   12/29/24 100.8 kg (222 lb 3.2 oz)       Physical Exam:  General: In no acute distress. Awake, alert, and oriented X 3. Resting comfortably in bed.  Skin:  Warm and dry.    Neck:  Supple. No JVD appreciated.  Chest:  Lungs with few wheezes posteriorly  Cardiovascular:  irreg, irreg; normal S1 and S2; soft systolic murmur.   Abdomen:  soft, nontender, +bowel sounds.   Extremities: Trace bilateral lower

## 2025-01-12 LAB
GLUCOSE BLD-MCNC: 149 MG/DL (ref 70–99)
GLUCOSE BLD-MCNC: 191 MG/DL (ref 70–99)
GLUCOSE BLD-MCNC: 192 MG/DL (ref 70–99)
GLUCOSE BLD-MCNC: 217 MG/DL (ref 70–99)
GLUCOSE BLD-MCNC: 229 MG/DL (ref 70–99)
PERFORMED ON: ABNORMAL

## 2025-01-12 PROCEDURE — 2580000003 HC RX 258: Performed by: NURSE PRACTITIONER

## 2025-01-12 PROCEDURE — 6370000000 HC RX 637 (ALT 250 FOR IP): Performed by: STUDENT IN AN ORGANIZED HEALTH CARE EDUCATION/TRAINING PROGRAM

## 2025-01-12 PROCEDURE — 6370000000 HC RX 637 (ALT 250 FOR IP): Performed by: INTERNAL MEDICINE

## 2025-01-12 PROCEDURE — 5A09457 ASSISTANCE WITH RESPIRATORY VENTILATION, 24-96 CONSECUTIVE HOURS, CONTINUOUS POSITIVE AIRWAY PRESSURE: ICD-10-PCS

## 2025-01-12 PROCEDURE — 94761 N-INVAS EAR/PLS OXIMETRY MLT: CPT

## 2025-01-12 PROCEDURE — 2060000000 HC ICU INTERMEDIATE R&B

## 2025-01-12 PROCEDURE — 94640 AIRWAY INHALATION TREATMENT: CPT

## 2025-01-12 PROCEDURE — 2700000000 HC OXYGEN THERAPY PER DAY

## 2025-01-12 PROCEDURE — 6360000002 HC RX W HCPCS: Performed by: INTERNAL MEDICINE

## 2025-01-12 PROCEDURE — 2500000003 HC RX 250 WO HCPCS: Performed by: INTERNAL MEDICINE

## 2025-01-12 PROCEDURE — 6370000000 HC RX 637 (ALT 250 FOR IP): Performed by: NURSE PRACTITIONER

## 2025-01-12 RX ORDER — MIDODRINE HYDROCHLORIDE 5 MG/1
10 TABLET ORAL ONCE
Status: COMPLETED | OUTPATIENT
Start: 2025-01-12 | End: 2025-01-12

## 2025-01-12 RX ORDER — METOPROLOL TARTRATE 25 MG/1
25 TABLET, FILM COATED ORAL EVERY 8 HOURS
Status: DISCONTINUED | OUTPATIENT
Start: 2025-01-12 | End: 2025-01-12

## 2025-01-12 RX ORDER — METOPROLOL TARTRATE 25 MG/1
25 TABLET, FILM COATED ORAL EVERY 8 HOURS SCHEDULED
Status: DISCONTINUED | OUTPATIENT
Start: 2025-01-12 | End: 2025-01-12

## 2025-01-12 RX ORDER — 0.9 % SODIUM CHLORIDE 0.9 %
250 INTRAVENOUS SOLUTION INTRAVENOUS ONCE
Status: COMPLETED | OUTPATIENT
Start: 2025-01-12 | End: 2025-01-13

## 2025-01-12 RX ORDER — MIDODRINE HYDROCHLORIDE 5 MG/1
10 TABLET ORAL EVERY 4 HOURS
Status: CANCELLED | OUTPATIENT
Start: 2025-01-12

## 2025-01-12 RX ORDER — METOPROLOL TARTRATE 25 MG/1
25 TABLET, FILM COATED ORAL 2 TIMES DAILY
Status: DISCONTINUED | OUTPATIENT
Start: 2025-01-13 | End: 2025-01-16

## 2025-01-12 RX ORDER — MIDODRINE HYDROCHLORIDE 5 MG/1
10 TABLET ORAL EVERY 6 HOURS
Status: DISCONTINUED | OUTPATIENT
Start: 2025-01-12 | End: 2025-01-13

## 2025-01-12 RX ORDER — 0.9 % SODIUM CHLORIDE 0.9 %
250 INTRAVENOUS SOLUTION INTRAVENOUS ONCE
Status: COMPLETED | OUTPATIENT
Start: 2025-01-12 | End: 2025-01-12

## 2025-01-12 RX ADMIN — IPRATROPIUM BROMIDE AND ALBUTEROL SULFATE 1 DOSE: 2.5; .5 SOLUTION RESPIRATORY (INHALATION) at 08:56

## 2025-01-12 RX ADMIN — SODIUM CHLORIDE 250 ML: 9 INJECTION, SOLUTION INTRAVENOUS at 23:13

## 2025-01-12 RX ADMIN — RANOLAZINE 500 MG: 500 TABLET, EXTENDED RELEASE ORAL at 08:48

## 2025-01-12 RX ADMIN — DULOXETINE HYDROCHLORIDE 60 MG: 60 CAPSULE, DELAYED RELEASE ORAL at 08:48

## 2025-01-12 RX ADMIN — IPRATROPIUM BROMIDE AND ALBUTEROL SULFATE 1 DOSE: 2.5; .5 SOLUTION RESPIRATORY (INHALATION) at 20:12

## 2025-01-12 RX ADMIN — APIXABAN 5 MG: 5 TABLET, FILM COATED ORAL at 08:48

## 2025-01-12 RX ADMIN — BUSPIRONE HYDROCHLORIDE 10 MG: 5 TABLET ORAL at 19:55

## 2025-01-12 RX ADMIN — SODIUM CHLORIDE, PRESERVATIVE FREE 10 ML: 5 INJECTION INTRAVENOUS at 19:56

## 2025-01-12 RX ADMIN — METOPROLOL TARTRATE 25 MG: 25 TABLET, FILM COATED ORAL at 09:23

## 2025-01-12 RX ADMIN — PROCHLORPERAZINE EDISYLATE 10 MG: 5 INJECTION INTRAMUSCULAR; INTRAVENOUS at 12:40

## 2025-01-12 RX ADMIN — METOPROLOL SUCCINATE 12.5 MG: 25 TABLET, FILM COATED, EXTENDED RELEASE ORAL at 08:48

## 2025-01-12 RX ADMIN — SODIUM CHLORIDE 250 ML: 9 INJECTION, SOLUTION INTRAVENOUS at 20:34

## 2025-01-12 RX ADMIN — BUSPIRONE HYDROCHLORIDE 10 MG: 5 TABLET ORAL at 14:14

## 2025-01-12 RX ADMIN — MIDODRINE HYDROCHLORIDE 10 MG: 5 TABLET ORAL at 18:38

## 2025-01-12 RX ADMIN — BUSPIRONE HYDROCHLORIDE 10 MG: 5 TABLET ORAL at 08:48

## 2025-01-12 RX ADMIN — SODIUM CHLORIDE, PRESERVATIVE FREE 10 ML: 5 INJECTION INTRAVENOUS at 09:34

## 2025-01-12 RX ADMIN — ISOSORBIDE MONONITRATE 30 MG: 30 TABLET, EXTENDED RELEASE ORAL at 08:48

## 2025-01-12 RX ADMIN — CLOPIDOGREL BISULFATE 75 MG: 75 TABLET ORAL at 08:48

## 2025-01-12 RX ADMIN — APIXABAN 5 MG: 5 TABLET, FILM COATED ORAL at 20:34

## 2025-01-12 RX ADMIN — CALCIUM ACETATE 1334 MG: 667 CAPSULE ORAL at 09:23

## 2025-01-12 RX ADMIN — CALCIUM ACETATE 1334 MG: 667 CAPSULE ORAL at 19:55

## 2025-01-12 RX ADMIN — MIDODRINE HYDROCHLORIDE 10 MG: 5 TABLET ORAL at 23:34

## 2025-01-12 RX ADMIN — RANOLAZINE 500 MG: 500 TABLET, EXTENDED RELEASE ORAL at 19:55

## 2025-01-12 RX ADMIN — PREGABALIN 25 MG: 25 CAPSULE ORAL at 08:48

## 2025-01-12 RX ADMIN — PANTOPRAZOLE SODIUM 40 MG: 40 TABLET, DELAYED RELEASE ORAL at 08:48

## 2025-01-12 RX ADMIN — ATORVASTATIN CALCIUM 80 MG: 80 TABLET, FILM COATED ORAL at 08:52

## 2025-01-12 NOTE — PROGRESS NOTES
New orders received, will wait for pharmacy to verify medication and will administer. Patient continues to be in afib RVR.

## 2025-01-12 NOTE — PLAN OF CARE
CHF Care Plan      Patient's EF (Ejection Fraction) is less than 40%    Heart Failure Medications:  Diuretics:: Other   HD     (One of the following REQUIRED for EF </= 40%/SYSTOLIC FAILURE but MAY be used in EF% >40%/DIASTOLIC FAILURE)        ACE:: None        ARB:: None         ARNI:: Sacubitril/Valsartan-Entresto held by provider    (Beta Blockers)  NON- Evidenced Based Beta Blocker (for EF% >40%/DIASTOLIC FAILURE): None    Evidenced Based Beta Blocker::(REQUIRED for EF% <40%/SYSTOLIC FAILURE) Metoprolol SUCCinate- Toprol XL  ...................................................................................................................................................    Failed to redirect to the Timeline version of the [employer] SmartLink.      Patient's weights and intake/output reviewed    Daily Weight log at bedside, patient/family participation in use of log: no attempted no interest     Patient's current weight today shows a difference of 3 lbs more than last documented weight.      Intake/Output Summary (Last 24 hours) at 1/12/2025 4457  Last data filed at 1/12/2025 042  Gross per 24 hour   Intake 540 ml   Output 0 ml   Net 540 ml       Education Booklet Provided: yes    Comorbidities Reviewed Yes    Patient has a past medical history of Ambulatory dysfunction, Aortic stenosis, Arthritis, Asthma, Bilateral hilar adenopathy syndrome, CAD (coronary artery disease), Cardiomyopathy (HCC), CHF, EF 35-40% (July 2024), Chronic pain, COPD (chronic obstructive pulmonary disease) (HCC), CVA (cerebral vascular accident) (HCC), Depression, Diabetes mellitus (HCC), Difficult intravenous access, Emphysema of lung (HCC), ESRD (end stage renal disease) on dialysis (HCC), Fear of needles, Gastric ulcer, GERD (gastroesophageal reflux disease), HD, T/R/Sa, Heart valve problem, History of spinal fracture, History of transcatheter aortic valve replacement (TAVR), Hx of blood clots, Hyperlipidemia, Hypertension, MI (myocardial

## 2025-01-12 NOTE — PROGRESS NOTES
Nephrology Progress Note   [facility]      Sub/interval history  The patient was seen and examined; he was resting comfortably with no acute events noted overnight.    ROS: No fever or chills.  Social: No family at bedside.    Scheduled Meds:   metoprolol tartrate  25 mg Oral q8h    ranolazine  500 mg Oral BID    insulin glargine  20 Units SubCUTAneous Nightly    apixaban  5 mg Oral BID    atorvastatin  80 mg Oral Daily    busPIRone  10 mg Oral TID    calcium acetate  2 capsule Oral TID    clopidogrel  75 mg Oral Daily    DULoxetine  60 mg Oral Daily    [Held by provider] isosorbide mononitrate  30 mg Oral Daily    pantoprazole  40 mg Oral Daily    pregabalin  25 mg Oral Daily    [Held by provider] sacubitril-valsartan  1 tablet Oral BID    sodium chloride flush  5-40 mL IntraVENous 2 times per day    insulin lispro  0-8 Units SubCUTAneous 4x Daily AC & HS     Continuous Infusions:   sodium chloride      dextrose       PRN Meds:.dilTIAZem, heparin (porcine), ipratropium 0.5 mg-albuterol 2.5 mg, midodrine, oxyCODONE, sodium chloride flush, sodium chloride, polyethylene glycol, acetaminophen **OR** acetaminophen, prochlorperazine, glucose, dextrose bolus **OR** dextrose bolus, glucagon (rDNA), dextrose    Objective/     Vitals:    01/12/25 0429 01/12/25 0830 01/12/25 0901 01/12/25 0926   BP:  (!) 132/119  116/88   Pulse:  (!) 114 (!) 118 (!) 137   Resp:  30 22    Temp:  98.2 °F (36.8 °C)     TempSrc:  Oral     SpO2:  94% 94%    Weight: 102.6 kg (226 lb 3.1 oz)      Height:         24HR INTAKE/OUTPUT:    Intake/Output Summary (Last 24 hours) at 1/12/2025 1100  Last data filed at 1/12/2025 0429  Gross per 24 hour   Intake 220 ml   Output 0 ml   Net 220 ml     Gen: resting comfortably   Neck: No JVD  Skin: Unremarkable  Cardiovascular:  S1, S2 without m/r/g   Respiratory: CTA B without w/r/r; respiratory effort normal  Abdomen:  soft, nt, nd,   Extremities: no lower extremity edema  Neuro/Psy: AAoriented times  3 ; moves all 4 ext    Data/  Recent Labs     01/11/25  0449   WBC 12.0*   HGB 9.6*   HCT 31.1*   MCV 86.1        Recent Labs     01/11/25  0449   *   K 4.8   CL 84*   CO2 22   GLUCOSE 176*   BUN 60*   CREATININE 7.2*   LABGLOM 8*       Assessment/   # ESRD on HD:    - Patient is on dialysis TTS at [facility] with a working [facility] TDC.    - Patient with longstanding high interdialytic weight gains.  -  kg.    # Chest Pain:  - H/o complex PCI, CAD s/p stents, TAVR.   - Per Cardiology.       # Hyperkalemia: improved recently.      # Anemia of chronic disease:    - Hgb is near targets.   - Getting EPO with dialysis     # DM2 with foot ulcers:   - Podiatry was consulted.       # Afib with RVR:  - S/p Dilt gtt.  Now on Dilt PO.      # CHF:  - PTA Entresto BID + IMDUR + Toprol XL.   - Cardiology is following.      # COPD. He has already quit smoking. Chronic hypoxic respiratory failure, baseline 4LNC.       Plan/   -HD per TTS schedule     -renal dose meds

## 2025-01-12 NOTE — PROGRESS NOTES
Hospital Medicine Progress Note      Subjective:  He complains of dyspnea.  Had full session of HD yesterday.  RVR since 7 AM, cardiology increased PO metoprolol.      General appearance: No apparent distress, appears stated age.  HEENT: Pupils equal, round.  Conjunctivae/corneas clear.  Neck: No jugular venous distention.   Respiratory:  Normal respiratory effort.  Bilaterally without Wheezes/Rhonchi.  Bibasilar rales.    Cardiovascular: Normal rate and irregular rhythm with normal S1/S2 without murmurs, rubs or gallops.  Abdomen: Soft, non-tender, non-distended with normal bowel sounds.  Musculoskeletal: No cyanosis bilaterally.  No edema peripherally.  Partial R foot amputation.    Skin: No jaundice.  No rashes or lesions.  Neurologic:  Neurovascularly intact without any focal sensory/motor deficits. Cranial nerves: II-XII intact, grossly non-focal.  Psychiatric: Alert and oriented, normal insight.      Presenting Admission History:  The patient is a pleasant 54 Y M with a h/o COPD on 4LNC, HTN, DM2 with partial R foot amputation, multivessel CAD deemed not a candidate for CABG s/p stents (most recently in 10/2024), ischemic cardiomyopathy with EF 35-40%, CHF, Afib, CVA, bicuspid AS s/p 2019 TAVR, PAD s/p R iliac stent, and ESRD on HD.  It looks like he was admitted 7x last year, often for chest pain and/or CHF (sometimes misses HD).  He lives at [facility].  The patient presented to [facility] on 1/6 with chest pain and dyspnea.  They saw pulmonary edema and bilateral pleural effusions on his CXR and nephrology was consulted for HD.  Cardiology was consulted because of the elevated troponin and recommended transfer to [facility] for interventional cardiology eval.  Upon arrival here the patient was breathing easily but did report ongoing dyspnea above baseline, worse orthopnea, worse edema in his abdomen.  No fevers or chills.  No further chest pain.       Assessment/Plan:        Acute on chronic systolic CHF, EF  ----- Message from Hiren Hamilton sent at 4/25/2019 11:56 AM CDT -----  Type:  RX Refill Request    Who Called:  Patient  Refill or New Rx: Refill  RX Name and Strength:  ibuprofen (ADVIL,MOTRIN) 800 MG tablet       How is the patient currently taking it? (ex. 1XDay):  1XDay  Is this a 30 day or 90 day RX:  90  Preferred Pharmacy with phone number:        [facility]/pharmacy #[id number] - [place] - [address]  [address]  [place] [address]  Phone: [phone] Fax: [phone]      Local or Mail Order:  Local  Ordering Provider:  Romeo  Best Call Back Number:  [phone]  Additional Information:       monae  [] major procedure or surgery with significant risk  [] change in code status or decision to escalate care  [] spent 50 minutes working on this patient today           Diet: ADULT DIET; Regular; 4 carb choices (60 gm/meal); Low Sodium (2 gm); Low Potassium (Less than 3000 mg/day); 1000 ml    Medications:        Infusion Medications    sodium chloride      dextrose       Scheduled Medications    metoprolol tartrate  25 mg Oral q8h    ranolazine  500 mg Oral BID    insulin glargine  20 Units SubCUTAneous Nightly    apixaban  5 mg Oral BID    atorvastatin  80 mg Oral Daily    busPIRone  10 mg Oral TID    calcium acetate  2 capsule Oral TID    clopidogrel  75 mg Oral Daily    DULoxetine  60 mg Oral Daily    [Held by provider] isosorbide mononitrate  30 mg Oral Daily    pantoprazole  40 mg Oral Daily    pregabalin  25 mg Oral Daily    [Held by provider] sacubitril-valsartan  1 tablet Oral BID    sodium chloride flush  5-40 mL IntraVENous 2 times per day    insulin lispro  0-8 Units SubCUTAneous 4x Daily AC & HS     PRN Meds: dilTIAZem, heparin (porcine), ipratropium 0.5 mg-albuterol 2.5 mg, midodrine, oxyCODONE, sodium chloride flush, sodium chloride, polyethylene glycol, acetaminophen **OR** acetaminophen, prochlorperazine, glucose, dextrose bolus **OR** dextrose bolus, glucagon (rDNA), dextrose    /88   Pulse (!) 137   Temp 98.2 °F (36.8 °C) (Oral)   Resp 22   Ht 1.753 m (5' 9.02\")   Wt 102.6 kg (226 lb 3.1 oz)   SpO2 94%   BMI 33.39 kg/m²     Telemetry:      Personally reviewed and interpreted telemetry (Rhythm Strip) on 1/12/2025 with the following findings:     Diet: ADULT DIET; Regular; 4 carb choices (60 gm/meal); Low Sodium (2 gm); Low Potassium (Less than 3000 mg/day); 1000 ml    DVT Prophylaxis: []PPx LMWH  []SQ Heparin  []IPC/SCDs  []Eliquis  []Xarelto  []Coumadin  [] Heparin Drip  []Other -      Code status: Full Code    PT/OT Eval Status:   []NOT yet ordered  []Ordered and Pending

## 2025-01-13 LAB
GLUCOSE BLD-MCNC: 134 MG/DL (ref 70–99)
GLUCOSE BLD-MCNC: 142 MG/DL (ref 70–99)
GLUCOSE BLD-MCNC: 164 MG/DL (ref 70–99)
GLUCOSE BLD-MCNC: 197 MG/DL (ref 70–99)
PERFORMED ON: ABNORMAL

## 2025-01-13 PROCEDURE — 6370000000 HC RX 637 (ALT 250 FOR IP): Performed by: NURSE PRACTITIONER

## 2025-01-13 PROCEDURE — 2700000000 HC OXYGEN THERAPY PER DAY

## 2025-01-13 PROCEDURE — 6370000000 HC RX 637 (ALT 250 FOR IP): Performed by: INTERNAL MEDICINE

## 2025-01-13 PROCEDURE — 2500000003 HC RX 250 WO HCPCS: Performed by: INTERNAL MEDICINE

## 2025-01-13 PROCEDURE — 6370000000 HC RX 637 (ALT 250 FOR IP): Performed by: STUDENT IN AN ORGANIZED HEALTH CARE EDUCATION/TRAINING PROGRAM

## 2025-01-13 PROCEDURE — 94640 AIRWAY INHALATION TREATMENT: CPT

## 2025-01-13 PROCEDURE — 99233 SBSQ HOSP IP/OBS HIGH 50: CPT | Performed by: NURSE PRACTITIONER

## 2025-01-13 PROCEDURE — 2060000000 HC ICU INTERMEDIATE R&B

## 2025-01-13 PROCEDURE — 94761 N-INVAS EAR/PLS OXIMETRY MLT: CPT

## 2025-01-13 RX ORDER — QUETIAPINE FUMARATE 25 MG/1
50 TABLET, FILM COATED ORAL ONCE
Status: COMPLETED | OUTPATIENT
Start: 2025-01-13 | End: 2025-01-13

## 2025-01-13 RX ORDER — OXYCODONE HYDROCHLORIDE 5 MG/1
10 TABLET ORAL EVERY 6 HOURS PRN
Status: DISCONTINUED | OUTPATIENT
Start: 2025-01-13 | End: 2025-01-16

## 2025-01-13 RX ADMIN — IPRATROPIUM BROMIDE AND ALBUTEROL SULFATE 1 DOSE: 2.5; .5 SOLUTION RESPIRATORY (INHALATION) at 05:01

## 2025-01-13 RX ADMIN — OXYCODONE 10 MG: 5 TABLET ORAL at 20:22

## 2025-01-13 RX ADMIN — IPRATROPIUM BROMIDE AND ALBUTEROL SULFATE 1 DOSE: 2.5; .5 SOLUTION RESPIRATORY (INHALATION) at 20:04

## 2025-01-13 RX ADMIN — RANOLAZINE 500 MG: 500 TABLET, EXTENDED RELEASE ORAL at 10:00

## 2025-01-13 RX ADMIN — INSULIN GLARGINE 20 UNITS: 100 INJECTION, SOLUTION SUBCUTANEOUS at 20:22

## 2025-01-13 RX ADMIN — IPRATROPIUM BROMIDE AND ALBUTEROL SULFATE 1 DOSE: 2.5; .5 SOLUTION RESPIRATORY (INHALATION) at 08:18

## 2025-01-13 RX ADMIN — CALCIUM ACETATE 1334 MG: 667 CAPSULE ORAL at 20:22

## 2025-01-13 RX ADMIN — PANTOPRAZOLE SODIUM 40 MG: 40 TABLET, DELAYED RELEASE ORAL at 10:00

## 2025-01-13 RX ADMIN — OXYCODONE 10 MG: 5 TABLET ORAL at 10:00

## 2025-01-13 RX ADMIN — INSULIN LISPRO 2 UNITS: 100 INJECTION, SOLUTION INTRAVENOUS; SUBCUTANEOUS at 13:47

## 2025-01-13 RX ADMIN — CALCIUM ACETATE 1334 MG: 667 CAPSULE ORAL at 10:00

## 2025-01-13 RX ADMIN — SODIUM CHLORIDE, PRESERVATIVE FREE 10 ML: 5 INJECTION INTRAVENOUS at 20:23

## 2025-01-13 RX ADMIN — ATORVASTATIN CALCIUM 80 MG: 80 TABLET, FILM COATED ORAL at 10:00

## 2025-01-13 RX ADMIN — APIXABAN 5 MG: 5 TABLET, FILM COATED ORAL at 10:00

## 2025-01-13 RX ADMIN — BUSPIRONE HYDROCHLORIDE 10 MG: 5 TABLET ORAL at 13:47

## 2025-01-13 RX ADMIN — RANOLAZINE 500 MG: 500 TABLET, EXTENDED RELEASE ORAL at 20:22

## 2025-01-13 RX ADMIN — METOPROLOL TARTRATE 25 MG: 25 TABLET, FILM COATED ORAL at 10:00

## 2025-01-13 RX ADMIN — MIDODRINE HYDROCHLORIDE 10 MG: 5 TABLET ORAL at 05:38

## 2025-01-13 RX ADMIN — QUETIAPINE FUMARATE 50 MG: 25 TABLET ORAL at 23:20

## 2025-01-13 RX ADMIN — APIXABAN 5 MG: 5 TABLET, FILM COATED ORAL at 20:22

## 2025-01-13 RX ADMIN — BUSPIRONE HYDROCHLORIDE 10 MG: 5 TABLET ORAL at 20:22

## 2025-01-13 RX ADMIN — PREGABALIN 25 MG: 25 CAPSULE ORAL at 10:00

## 2025-01-13 RX ADMIN — CALCIUM ACETATE 1334 MG: 667 CAPSULE ORAL at 13:47

## 2025-01-13 RX ADMIN — DULOXETINE HYDROCHLORIDE 60 MG: 60 CAPSULE, DELAYED RELEASE ORAL at 10:00

## 2025-01-13 RX ADMIN — BUSPIRONE HYDROCHLORIDE 10 MG: 5 TABLET ORAL at 10:00

## 2025-01-13 RX ADMIN — CLOPIDOGREL BISULFATE 75 MG: 75 TABLET ORAL at 10:00

## 2025-01-13 ASSESSMENT — PAIN SCALES - GENERAL
PAINLEVEL_OUTOF10: 10
PAINLEVEL_OUTOF10: 10
PAINLEVEL_OUTOF10: 3
PAINLEVEL_OUTOF10: 10
PAINLEVEL_OUTOF10: 10

## 2025-01-13 ASSESSMENT — PAIN DESCRIPTION - LOCATION
LOCATION: BACK

## 2025-01-13 ASSESSMENT — PAIN DESCRIPTION - ORIENTATION
ORIENTATION: MID;LOWER
ORIENTATION: MID
ORIENTATION: MID

## 2025-01-13 NOTE — CARE COORDINATION
CM update; LOS # 6; IM, Nephrology, Cardiology, Respiratory. Per IM patient needs to stabilize prior to discharge. 1-2 days patient is LTC at [facility] and can return no barriers. Will follow.Belkis Mcdaniel RN

## 2025-01-13 NOTE — PROGRESS NOTES
Podiatry Progress Note    Subjective:   Patient seen at bedside this evening.  No new pedal complaints.     Objective:   Vitals:  BP (!) 102/56   Pulse 99   Temp 97.5 °F (36.4 °C) (Axillary)   Resp 20   Ht 1.753 m (5' 9.02\")   Wt 101.8 kg (224 lb 6.4 oz)   SpO2 93%   BMI 33.12 kg/m²   Integument:  Full thickness ulceration at the lateral left fifth metatarsal head.  It measures approximately 0.5cm x 0.8cm x 0.2cm.  The base was granular.  No active drainage.  No surrounding erythema.  No purulence. The wound did not probe or undermine.  No acute signs of infection.  Otherwise, skin was warm, dry and supple, bilateral.   Neurologic:  Protective sensation is grossly diminished to light touch at the level of the distal foot, bilateral.  Vascular:  DP and PT pulses are palpable, bilateral.  CFT is brisk to the distal foot, bilateral.  No significant edema appreciated.  Musculoskeletal:  TMA on the right.  No gross musculoskeletal deformities on the left.     Labs:   CBC with Differential:    Lab Results   Component Value Date/Time    WBC 12.0 01/11/2025 04:49 AM    RBC 3.61 01/11/2025 04:49 AM    HGB 9.6 01/11/2025 04:49 AM    HCT 31.1 01/11/2025 04:49 AM     01/11/2025 04:49 AM    MCV 86.1 01/11/2025 04:49 AM    MCH 26.7 01/11/2025 04:49 AM    MCHC 30.9 01/11/2025 04:49 AM    RDW 19.6 01/11/2025 04:49 AM    BANDSPCT 2 01/07/2025 08:42 AM    METASPCT 1 04/07/2024 05:12 AM    LYMPHOPCT 2.0 01/07/2025 08:42 AM    MONOPCT 13.0 01/07/2025 08:42 AM    MYELOPCT 1 04/07/2024 05:12 AM    EOSPCT 0.0 01/07/2025 08:42 AM    BASOPCT 1.0 01/07/2025 08:42 AM    MONOSABS 1.3 01/07/2025 08:42 AM    LYMPHSABS 0.2 01/07/2025 08:42 AM    EOSABS 0.0 01/07/2025 08:42 AM    BASOSABS 0.1 01/07/2025 08:42 AM    DIFFTYPE Auto 05/17/2013 06:50 AM     CMP:    Lab Results   Component Value Date/Time     01/11/2025 04:49 AM    K 4.8 01/11/2025 04:49 AM    CL 84 01/11/2025 04:49 AM    CO2 22 01/11/2025 04:49 AM    BUN 60

## 2025-01-13 NOTE — PLAN OF CARE
Problem: Chronic Conditions and Co-morbidities  Goal: Patient's chronic conditions and co-morbidity symptoms are monitored and maintained or improved  1/13/2025 1314 by Ginny Kilgore RN  Outcome: Progressing  1/13/2025 0027 by Jackie Merchant RN  Outcome: Progressing  Note:   CHF Care Plan      Patient's EF (Ejection Fraction) is less than 40%    Heart Failure Medications:  Diuretics:: None    (One of the following REQUIRED for EF </= 40%/SYSTOLIC FAILURE but MAY be used in EF% >40%/DIASTOLIC FAILURE)        ACE:: None        ARB:: None         ARNI:: None    (Beta Blockers)  NON- Evidenced Based Beta Blocker (for EF% >40%/DIASTOLIC FAILURE): None    Evidenced Based Beta Blocker::(REQUIRED for EF% <40%/SYSTOLIC FAILURE) None  ...................................................................................................................................................    Failed to redirect to the Timeline version of the [employer] SmartLink.      Patient's weights and intake/output reviewed    Daily Weight log at bedside, patient/family participation in use of log: \"yes    Patient's current weight today shows a difference of 2 lbs more than last documented weight.      Intake/Output Summary (Last 24 hours) at 1/13/2025 0026  Last data filed at 1/12/2025 0429  Gross per 24 hour   Intake 220 ml   Output 0 ml   Net 220 ml       Education Booklet Provided: yes    Comorbidities Reviewed Yes    Patient has a past medical history of Ambulatory dysfunction, Aortic stenosis, Arthritis, Asthma, Bilateral hilar adenopathy syndrome, CAD (coronary artery disease), Cardiomyopathy (HCC), CHF, EF 35-40% (July 2024), Chronic pain, COPD (chronic obstructive pulmonary disease) ([facility]), CVA (cerebral vascular accident) (HCC), Depression, Diabetes mellitus (HCC), Difficult intravenous access, Emphysema of lung (HCC), ESRD (end stage renal disease) on dialysis (HCC), Fear of needles, Gastric ulcer, GERD (gastroesophageal reflux

## 2025-01-13 NOTE — PROGRESS NOTES
RRT called around 2320 for persistent hypotension for few hours (after placement on BPAP). Denied any symptoms. Had HD yesterday Saturday, has HFrEF (LVEF 25-30% with reduced RV function). Received gentle IVF of 500 cc with minimal improvement. Will remove BPAP to improve RV afterload and LV preload. Also start midodrine q6h for now with holding parameters, no signs of infection, labs ordered. Might need goal of SBP >90 mmHg instead in setting of HFrEF. Continue to monitor.     Vitals: temp 97.5 °F (36.4 °C), BP 81/56/64, HR 86, RR 12, SpO2 95% on BPAP   General: Awake.     HEENT: PERRLA. Vision grossly intact. Normal hearing. Oropharynx clear.  Neck: Supple. Mild JVD to neck   CV: RRR. NL S1/S2. No BLE pitting edema. Warm-to-touch distally. Left CVC.   Pulm: NL effort on 4L NC. CTAB.   GI: +BS x4. Soft. NT/ND.  : No CVA tenderness.    Skin: Intact, warm and dry.  MSK: Right TMA.  Neuro: AAOx3. CNs grossly intact. Normal speech. No focal deficit.   Psych: Good judgement and reason.     Critical care time, excluding time doing procedures, was 35 minutes.    Isaiah Irizarry MD.  [facility] medicine - night team

## 2025-01-13 NOTE — PROGRESS NOTES
[facility]   Daily Progress Note      Admit Date:  1/7/2025    Reason for follow up visit: Hypotension, Chest pain; atrial fib with RVR and elevated troponin    CC: \"I feel terrible this morning and hurt all over. I am not having any chest pain.\"      55 y/o male with PMH notable for  CAD/PATRICIA to LAD in 2015,PATRICIA to ramus, PATRICIA to LCX and PATRICIA to RCA previously/laser atherectomy and shockwave coronary lithotripsy and drug coated balloon to RCA with borderline significant disease in LAD with stent protrusion Dg1 into LAD on 10/21/24,  Aortic stenosis and S/P TAVR in 10/2019, HFrEF, ischemic cardiomyopathy, HTN, PAF, PVD (PTA and stent to REIA on 5/2019), ESRD, CVA, DM, ZAINAB, COPD on home O2 and noncompliance who was admitted to [facility] with chest pain. Elevated troponin and atrial fib with RVR. Asked to revisit this AM by RN d/t hypotension.    Interval History:  Pt. seen and examined; records reviewed  Events overnight reviewed.  BP labile this AM: placed on high dose midodrine overnight (has received 2 doses)  Hypotensive overnight: received IVF bolus and midodrine; now improved  Using PAP overnight; now on O2 @ 4L  Wt today 224#  + generalized pain and malaise  Denies chest pain, SOB, cough, palpitations or dizziness  Imdur, Entresto remain on hold  Remains on BID metoprolol    Subjective:  Pt with no acute overnight cardiac events.   A 14 point review of systems was completed. Pertinent positives were identified in the HPI/Interval history. All other review of symptoms negative unless otherwise noted below.    Objective:   /66   Pulse 69   Temp 97.7 °F (36.5 °C) (Axillary)   Resp 22   Ht 1.753 m (5' 9.02\")   Wt 101.8 kg (224 lb 6.4 oz)   SpO2 95%   BMI 33.12 kg/m²     Intake/Output Summary (Last 24 hours) at 1/13/2025 1016  Last data filed at 1/13/2025 0536  Gross per 24 hour   Intake 0 ml   Output 0 ml   Net 0 ml     Wt Readings from Last 3 Encounters:   01/13/25 101.8 kg (224 lb 6.4 oz)  normal. It gave off the left anterior descending artery and left circumflex.  2. Left anterior descending artery has moderate atherosclerotic disease.  It was moderate in size. It gave off septal perforators and a moderate sized diagonal branch. The LAD covered the entire apex of the left ventricle.  There is a previously placed stent within the mid changes sending artery after the takeoff of the first diagonal branch.  There is moderate diffuse atherosclerosis of this segment.  3. Left circumflex has severe atherosclerotic disease.  It was moderate in size. There is a large ramus intermediate branch.  There is 90% eccentric plaque noted in the origin of the ramus intermediate branch and 90% atherosclerosis within the proximal circumflex.  4. Right coronary artery has severe atherosclerotic disease. It was moderate in size and was the dominant artery.  The previously placed stent within the mid right coronary artery there is diffuse in-stent restenosis that is about 95%.  5. Left ventriculogram showed severely reduced left ventricular function with ejection fraction of 30%.  Left ventricularpressure was measured at 24.  Echo pressure 115/57 there is no gradient close pullback.     CONCLUSIONS:  1.  Successful IVUS guided drug-eluting stent insertion to the ramus intermediate, native circumflex, and native right coronary artery     Telemetry:Atrial fib with controlled VR (rate 80-90's)  (Personally reviewed)    Assessment/Plan:    1) Chest pain/Elevated troponins  -chronically elevated troponins d/t fluid overload; not an ACS  -no further chest pain; has been stable  -continue plavix and high intensity statin  -stress test this admit: fixed defect and continue with medical management    2) CAD  -severe multivessel disease (poor CABG candidate)  -prior multiple PCI's  -last in 10/2024: successful laser atherectomy of RCA, successful shockwave coronary lithotripsy of RCA,   -angina quiet and stable  -continue plavix

## 2025-01-13 NOTE — PROGRESS NOTES
01/12/25 2123   NIV Type   NIV Started/Stopped On   Equipment Type v60   Mode Bilevel   Mask Type Full face mask   Assessment   Respirations 15   SpO2 99 %   Comfort Level Good   Using Accessory Muscles No   Mask Compliance Good   Skin Assessment Clean, dry, & intact   Skin Protection for O2 Device Yes   Location Nose   Settings/Measurements   PIP Observed 10 cm H20   IPAP 14 cmH20   CPAP/EPAP 5 cmH2O   Vt (Measured) 567 mL   Rate Ordered 10   Insp Rise Time (%) 2 %   FiO2  35 %   I Time/ I Time % 1 s   Minute Volume (L/min) 8.5 Liters   Mask Leak (lpm) 30 lpm  (pt does have beard)   Patient's Home Machine No   Alarm Settings   Alarms On Y   Low Pressure (cmH2O) 6 cmH2O   High Pressure (cmH2O) 30 cmH2O   Delay Alarm 20 sec(s)   RR Low (bpm) 6   RR High (bpm) 40 br/min

## 2025-01-13 NOTE — PROGRESS NOTES
01/13/25 0458   NIV Type   NIV Started/Stopped On   Equipment Type v60   Mode Bilevel   Mask Type Full face mask   Assessment   Respirations 20   SpO2 94 %   Comfort Level Good   Using Accessory Muscles No   Mask Compliance Good   Skin Assessment Clean, dry, & intact   Skin Protection for O2 Device Yes   Location Nose   Settings/Measurements   PIP Observed 16 cm H20   IPAP 14 cmH20   CPAP/EPAP 5 cmH2O   Vt (Measured) 429 mL   Rate Ordered 10   Insp Rise Time (%) 2 %   FiO2  35 %   I Time/ I Time % 1 s   Minute Volume (L/min) 12.2 Liters   Mask Leak (lpm) 18 lpm   Patient's Home Machine No   Alarm Settings   Alarms On Y   Low Pressure (cmH2O) 6 cmH2O   High Pressure (cmH2O) 30 cmH2O   Delay Alarm 20 sec(s)   RR Low (bpm) 6   RR High (bpm) 40 br/min

## 2025-01-13 NOTE — PROGRESS NOTES
01/12/25 2322   NIV Type   NIV Started/Stopped On   Equipment Type v60   Mode Bilevel   Mask Type Full face mask   Assessment   Pulse 86   BP (!) 69/41   SpO2 95 %   Comfort Level Good   Using Accessory Muscles No   Mask Compliance Good   Skin Assessment Clean, dry, & intact   Skin Protection for O2 Device Yes   Location Nose   Settings/Measurements   PIP Observed 15 cm H20   IPAP 14 cmH20   CPAP/EPAP 5 cmH2O   Vt (Measured) 477 mL   Rate Ordered 10   Insp Rise Time (%) 2 %   FiO2  35 %   I Time/ I Time % 1 s   Minute Volume (L/min) 12.1 Liters   Patient's Home Machine No   Alarm Settings   Alarms On Y

## 2025-01-13 NOTE — PLAN OF CARE
Problem: Safety - Adult  Goal: Free from fall injury  1/13/2025 0027 by Jackie Merchant RN  Outcome: Progressing  Note: Pt will remain free from falls throughout hospital stay. Fall precautions in place, bed alarm on, bed in lowest position with wheels locked and side rails 2/4 up. Will continue to monitor throughout shift.      Problem: ABCDS Injury Assessment  Goal: Absence of physical injury  Outcome: Progressing     Problem: Skin/Tissue Integrity  Goal: Absence of new skin breakdown  Description: 1.  Monitor for areas of redness and/or skin breakdown  2.  Assess vascular access sites hourly  3.  Every 4-6 hours minimum:  Change oxygen saturation probe site  4.  Every 4-6 hours:  If on nasal continuous positive airway pressure, respiratory therapy assess nares and determine need for appliance change or resting period.  Outcome: Progressing     Problem: Chronic Conditions and Co-morbidities  Goal: Patient's chronic conditions and co-morbidity symptoms are monitored and maintained or improved  Outcome: Progressing  Note:   CHF Care Plan      Patient's EF (Ejection Fraction) is less than 40%    Heart Failure Medications:  Diuretics:: None    (One of the following REQUIRED for EF </= 40%/SYSTOLIC FAILURE but MAY be used in EF% >40%/DIASTOLIC FAILURE)        ACE:: None        ARB:: None         ARNI:: None    (Beta Blockers)  NON- Evidenced Based Beta Blocker (for EF% >40%/DIASTOLIC FAILURE): None    Evidenced Based Beta Blocker::(REQUIRED for EF% <40%/SYSTOLIC FAILURE) None  ...................................................................................................................................................    Failed to redirect to the Timeline version of the [employer] SmartLink.      Patient's weights and intake/output reviewed    Daily Weight log at bedside, patient/family participation in use of log: \"yes    Patient's current weight today shows a difference of 3 lbs more than last documented  weight.      Intake/Output Summary (Last 24 hours) at 1/13/2025 0026  Last data filed at 1/12/2025 0429  Gross per 24 hour   Intake 220 ml   Output 0 ml   Net 220 ml       Education Booklet Provided: yes    Comorbidities Reviewed Yes    Patient has a past medical history of Ambulatory dysfunction, Aortic stenosis, Arthritis, Asthma, Bilateral hilar adenopathy syndrome, CAD (coronary artery disease), Cardiomyopathy ([facility]), CHF, EF 35-40% (July 2024), Chronic pain, COPD (chronic obstructive pulmonary disease) ([facility]), CVA (cerebral vascular accident) ([facility]), Depression, Diabetes mellitus ([facility]), Difficult intravenous access, Emphysema of lung ([facility]), ESRD (end stage renal disease) on dialysis ([facility]), Fear of needles, Gastric ulcer, GERD (gastroesophageal reflux disease), HD, T/R/Sa, Heart valve problem, History of spinal fracture, History of transcatheter aortic valve replacement (TAVR), Hx of blood clots, Hyperlipidemia, Hypertension, MI (myocardial infarction) ([facility]), Neuromuscular disorder ([facility]), Numbness and tingling in left arm, Pneumonia, PONV (postoperative nausea and vomiting), Prolonged emergence from general anesthesia, Sleep apnea, Stroke ([facility]), TIA (transient ischemic attack), and Unspecified diseases of blood and blood-forming organs.     >>For CHF and Comorbidity documentation on Education Time and Topics, please see Education Tab      CHF Education    Learners:  Patient  Readineess:   Acceptance  Method:   Explanation  Response:    Demonstrated Understanding and Needs Reinforcement    Comments:     Time Spent:       Pt resting in bed at this time on  4 L O2. Pt with complaints of shortness of breath. Pt with pitting lower extremity edema.     Patient and/or Family's stated Goal of Care this Admission: reduce shortness of breath, increase activity tolerance, better understand heart failure and disease management, be more comfortable, and reduce lower extremity edema prior to discharge        :

## 2025-01-13 NOTE — PROGRESS NOTES
Nephrology Progress Note   [email or web address]      Sub/interval history  Igor Ann is a 54 year old male being seen for ESKD on HD.    Patient was seen and examined at bedside. He feels okay today, eating breakfast. He denies any CP or SOB, remains on supplemental O2. BP low overnight, improved this morning.   - Labs pending   - Notes reviewed   - ON events noted, RRT called for hypotension   - PMShx reviewed  - Last HD 1/11/25, 2L UF, .2kg    ROS: No fever or chills.  Social: No family at bedside.    Scheduled Meds:   metoprolol tartrate  25 mg Oral BID    ranolazine  500 mg Oral BID    insulin glargine  20 Units SubCUTAneous Nightly    apixaban  5 mg Oral BID    atorvastatin  80 mg Oral Daily    busPIRone  10 mg Oral TID    calcium acetate  2 capsule Oral TID    clopidogrel  75 mg Oral Daily    DULoxetine  60 mg Oral Daily    [Held by provider] isosorbide mononitrate  30 mg Oral Daily    pantoprazole  40 mg Oral Daily    pregabalin  25 mg Oral Daily    [Held by provider] sacubitril-valsartan  1 tablet Oral BID    sodium chloride flush  5-40 mL IntraVENous 2 times per day    insulin lispro  0-8 Units SubCUTAneous 4x Daily AC & HS     Continuous Infusions:   sodium chloride      dextrose       PRN Meds:.oxyCODONE, dilTIAZem, heparin (porcine), ipratropium 0.5 mg-albuterol 2.5 mg, midodrine, sodium chloride flush, sodium chloride, polyethylene glycol, acetaminophen **OR** acetaminophen, prochlorperazine, glucose, dextrose bolus **OR** dextrose bolus, glucagon (rDNA), dextrose    Objective/     Vitals:    01/13/25 0536 01/13/25 0745 01/13/25 0833 01/13/25 1000   BP: (!) 83/73 (!) 86/66 (!) 173/68 104/66   Pulse: 81 (!) 114  69   Resp:  22     Temp:  97.7 °F (36.5 °C)     TempSrc:  Axillary     SpO2:  95%     Weight:       Height:         24HR INTAKE/OUTPUT:    Intake/Output Summary (Last 24 hours) at 1/13/2025 1112  Last data filed at 1/13/2025 0536  Gross per 24 hour   Intake 0 ml   Output 0 ml   Net

## 2025-01-13 NOTE — PROGRESS NOTES
Hospital Medicine Progress Note      Subjective:  Rapid response overnight for hypotension.  Cardiology cut back on his cardiac meds.        General appearance: No apparent distress, appears stated age.  HEENT: Pupils equal, round.  Conjunctivae/corneas clear.  Neck: No jugular venous distention.   Respiratory:  Normal respiratory effort.  Bilaterally without Wheezes/Rhonchi.  Bibasilar rales.    Cardiovascular: Normal rate and irregular rhythm with normal S1/S2 without murmurs, rubs or gallops.  Abdomen: Soft, non-tender, non-distended with normal bowel sounds.  Musculoskeletal: No cyanosis bilaterally.  No edema peripherally.  Partial R foot amputation.    Skin: No jaundice.  No rashes or lesions.  Neurologic:  Neurovascularly intact without any focal sensory/motor deficits. Cranial nerves: II-XII intact, grossly non-focal.  Psychiatric: Alert and oriented, normal insight.      Presenting Admission History:  The patient is a pleasant 54 Y M with a h/o COPD on 4LNC, HTN, DM2 with partial R foot amputation, multivessel CAD deemed not a candidate for CABG s/p stents (most recently in 10/2024), ischemic cardiomyopathy with EF 35-40%, CHF, Afib, CVA, bicuspid AS s/p 2019 TAVR, PAD s/p R iliac stent, and ESRD on HD.  It looks like he was admitted 7x last year, often for chest pain and/or CHF (sometimes misses HD).  [facility] at [facility].  The patient presented to [facility] on 1/6 with chest pain and dyspnea.  They saw pulmonary edema and bilateral pleural effusions on his CXR and nephrology was consulted for HD.  Cardiology was consulted because of the elevated troponin and recommended transfer to [facility] for interventional cardiology eval.  Upon arrival here the patient was breathing easily but did report ongoing dyspnea above baseline, worse orthopnea, worse edema in his abdomen.  No fevers or chills.  No further chest pain.       Assessment/Plan:        Acute on chronic systolic CHF, EF 25-30%.  Volume management

## 2025-01-14 ENCOUNTER — APPOINTMENT (OUTPATIENT)
Dept: GENERAL RADIOLOGY | Age: 55
End: 2025-01-14
Attending: INTERNAL MEDICINE
Payer: MEDICARE

## 2025-01-14 LAB
ANION GAP SERPL CALCULATED.3IONS-SCNC: 23 MMOL/L (ref 3–16)
BASE EXCESS BLDA CALC-SCNC: -5.9 MMOL/L (ref -3–3)
BUN SERPL-MCNC: 73 MG/DL (ref 7–20)
CALCIUM SERPL-MCNC: 9 MG/DL (ref 8.3–10.6)
CHLORIDE SERPL-SCNC: 90 MMOL/L (ref 99–110)
CO2 BLDA-SCNC: 20.4 MMOL/L
CO2 SERPL-SCNC: 14 MMOL/L (ref 21–32)
COHGB MFR BLDA: 2.2 % (ref 0–1.5)
CREAT SERPL-MCNC: 7.4 MG/DL (ref 0.9–1.3)
DEPRECATED RDW RBC AUTO: 19.9 % (ref 12.4–15.4)
GFR SERPLBLD CREATININE-BSD FMLA CKD-EPI: 8 ML/MIN/{1.73_M2}
GLUCOSE BLD-MCNC: 108 MG/DL (ref 70–99)
GLUCOSE BLD-MCNC: 135 MG/DL (ref 70–99)
GLUCOSE BLD-MCNC: 141 MG/DL (ref 70–99)
GLUCOSE SERPL-MCNC: 147 MG/DL (ref 70–99)
HCO3 BLDA-SCNC: 19.3 MMOL/L (ref 21–29)
HCT VFR BLD AUTO: 29.2 % (ref 40.5–52.5)
HGB BLD-MCNC: 9.4 G/DL (ref 13.5–17.5)
HGB BLDA-MCNC: 9.7 G/DL (ref 13.5–17.5)
LACTATE BLDV-SCNC: 1.1 MMOL/L (ref 0.4–2)
MCH RBC QN AUTO: 27 PG (ref 26–34)
MCHC RBC AUTO-ENTMCNC: 32.1 G/DL (ref 31–36)
MCV RBC AUTO: 84.1 FL (ref 80–100)
METHGB MFR BLDA: 0.3 %
O2 THERAPY: ABNORMAL
PCO2 BLDA: 36.6 MMHG (ref 35–45)
PERFORMED ON: ABNORMAL
PH BLDA: 7.34 [PH] (ref 7.35–7.45)
PLATELET # BLD AUTO: 166 K/UL (ref 135–450)
PMV BLD AUTO: 9.4 FL (ref 5–10.5)
PO2 BLDA: 78.3 MMHG (ref 75–108)
POTASSIUM SERPL-SCNC: 6 MMOL/L (ref 3.5–5.1)
RBC # BLD AUTO: 3.47 M/UL (ref 4.2–5.9)
SAO2 % BLDA: 93.6 %
SODIUM SERPL-SCNC: 127 MMOL/L (ref 136–145)
WBC # BLD AUTO: 11 K/UL (ref 4–11)

## 2025-01-14 PROCEDURE — 82803 BLOOD GASES ANY COMBINATION: CPT

## 2025-01-14 PROCEDURE — 80048 BASIC METABOLIC PNL TOTAL CA: CPT

## 2025-01-14 PROCEDURE — 36600 WITHDRAWAL OF ARTERIAL BLOOD: CPT

## 2025-01-14 PROCEDURE — 2060000000 HC ICU INTERMEDIATE R&B

## 2025-01-14 PROCEDURE — 36415 COLL VENOUS BLD VENIPUNCTURE: CPT

## 2025-01-14 PROCEDURE — 6360000002 HC RX W HCPCS

## 2025-01-14 PROCEDURE — 85027 COMPLETE CBC AUTOMATED: CPT

## 2025-01-14 PROCEDURE — 83605 ASSAY OF LACTIC ACID: CPT

## 2025-01-14 PROCEDURE — 94660 CPAP INITIATION&MGMT: CPT

## 2025-01-14 PROCEDURE — 90935 HEMODIALYSIS ONE EVALUATION: CPT

## 2025-01-14 PROCEDURE — 6360000002 HC RX W HCPCS: Performed by: NURSE PRACTITIONER

## 2025-01-14 PROCEDURE — 6370000000 HC RX 637 (ALT 250 FOR IP): Performed by: NURSE PRACTITIONER

## 2025-01-14 PROCEDURE — 6370000000 HC RX 637 (ALT 250 FOR IP)

## 2025-01-14 PROCEDURE — 99232 SBSQ HOSP IP/OBS MODERATE 35: CPT | Performed by: NURSE PRACTITIONER

## 2025-01-14 PROCEDURE — 2500000003 HC RX 250 WO HCPCS: Performed by: INTERNAL MEDICINE

## 2025-01-14 PROCEDURE — 6370000000 HC RX 637 (ALT 250 FOR IP): Performed by: INTERNAL MEDICINE

## 2025-01-14 PROCEDURE — 71045 X-RAY EXAM CHEST 1 VIEW: CPT

## 2025-01-14 PROCEDURE — 6360000002 HC RX W HCPCS: Performed by: INTERNAL MEDICINE

## 2025-01-14 PROCEDURE — P9047 ALBUMIN (HUMAN), 25%, 50ML: HCPCS

## 2025-01-14 PROCEDURE — P9045 ALBUMIN (HUMAN), 5%, 250 ML: HCPCS | Performed by: NURSE PRACTITIONER

## 2025-01-14 RX ORDER — ALBUMIN HUMAN 50 G/1000ML
25 SOLUTION INTRAVENOUS ONCE
Status: COMPLETED | OUTPATIENT
Start: 2025-01-14 | End: 2025-01-14

## 2025-01-14 RX ORDER — MIDODRINE HYDROCHLORIDE 5 MG/1
5 TABLET ORAL
Status: DISCONTINUED | OUTPATIENT
Start: 2025-01-14 | End: 2025-01-16

## 2025-01-14 RX ORDER — ALBUMIN (HUMAN) 12.5 G/50ML
25 SOLUTION INTRAVENOUS
Status: COMPLETED | OUTPATIENT
Start: 2025-01-14 | End: 2025-01-14

## 2025-01-14 RX ORDER — MIDODRINE HYDROCHLORIDE 5 MG/1
2.5 TABLET ORAL
Status: DISCONTINUED | OUTPATIENT
Start: 2025-01-14 | End: 2025-01-14

## 2025-01-14 RX ORDER — ALBUMIN (HUMAN) 12.5 G/50ML
SOLUTION INTRAVENOUS
Status: DISPENSED
Start: 2025-01-14 | End: 2025-01-15

## 2025-01-14 RX ORDER — LORAZEPAM 2 MG/ML
0.5 INJECTION INTRAMUSCULAR ONCE
Status: COMPLETED | OUTPATIENT
Start: 2025-01-14 | End: 2025-01-14

## 2025-01-14 RX ORDER — INSULIN GLARGINE 100 [IU]/ML
14 INJECTION, SOLUTION SUBCUTANEOUS NIGHTLY
Status: DISCONTINUED | OUTPATIENT
Start: 2025-01-14 | End: 2025-01-16

## 2025-01-14 RX ADMIN — LORAZEPAM 0.5 MG: 2 INJECTION INTRAMUSCULAR; INTRAVENOUS at 21:48

## 2025-01-14 RX ADMIN — ATORVASTATIN CALCIUM 80 MG: 80 TABLET, FILM COATED ORAL at 09:03

## 2025-01-14 RX ADMIN — INSULIN GLARGINE 14 UNITS: 100 INJECTION, SOLUTION SUBCUTANEOUS at 20:38

## 2025-01-14 RX ADMIN — MIDODRINE HYDROCHLORIDE 5 MG: 5 TABLET ORAL at 11:57

## 2025-01-14 RX ADMIN — OXYCODONE 10 MG: 5 TABLET ORAL at 10:17

## 2025-01-14 RX ADMIN — SODIUM CHLORIDE, PRESERVATIVE FREE 10 ML: 5 INJECTION INTRAVENOUS at 09:05

## 2025-01-14 RX ADMIN — ALBUMIN (HUMAN) 25 G: 2.5 SOLUTION INTRAVENOUS at 21:46

## 2025-01-14 RX ADMIN — CALCIUM ACETATE 1334 MG: 667 CAPSULE ORAL at 09:03

## 2025-01-14 RX ADMIN — HEPARIN SODIUM 3600 UNITS: 1000 INJECTION INTRAVENOUS; SUBCUTANEOUS at 17:04

## 2025-01-14 RX ADMIN — MIDODRINE HYDROCHLORIDE 5 MG: 5 TABLET ORAL at 09:03

## 2025-01-14 RX ADMIN — RANOLAZINE 500 MG: 500 TABLET, EXTENDED RELEASE ORAL at 09:03

## 2025-01-14 RX ADMIN — PREGABALIN 25 MG: 25 CAPSULE ORAL at 09:03

## 2025-01-14 RX ADMIN — PANTOPRAZOLE SODIUM 40 MG: 40 TABLET, DELAYED RELEASE ORAL at 09:03

## 2025-01-14 RX ADMIN — DULOXETINE HYDROCHLORIDE 60 MG: 60 CAPSULE, DELAYED RELEASE ORAL at 09:04

## 2025-01-14 RX ADMIN — APIXABAN 5 MG: 5 TABLET, FILM COATED ORAL at 09:04

## 2025-01-14 RX ADMIN — BUSPIRONE HYDROCHLORIDE 10 MG: 5 TABLET ORAL at 09:03

## 2025-01-14 RX ADMIN — SODIUM ZIRCONIUM CYCLOSILICATE 10 G: 10 POWDER, FOR SUSPENSION ORAL at 09:03

## 2025-01-14 RX ADMIN — ALBUMIN (HUMAN) 12.5 G: 0.25 INJECTION, SOLUTION INTRAVENOUS at 13:54

## 2025-01-14 RX ADMIN — CLOPIDOGREL BISULFATE 75 MG: 75 TABLET ORAL at 09:04

## 2025-01-14 RX ADMIN — SODIUM CHLORIDE, PRESERVATIVE FREE 10 ML: 5 INJECTION INTRAVENOUS at 20:39

## 2025-01-14 ASSESSMENT — PAIN SCALES - GENERAL
PAINLEVEL_OUTOF10: 10
PAINLEVEL_OUTOF10: 0

## 2025-01-14 NOTE — PROGRESS NOTES
01/14/25 1322   NIV Type   NIV Started/Stopped On   Equipment Type v60   Mode Bilevel   Mask Type Full face mask   Mask Size Medium   Settings/Measurements   PIP Observed 16 cm H20   IPAP 14 cmH20   CPAP/EPAP 5 cmH2O   Vt (Measured) 564 mL   Rate Ordered 10   Insp Rise Time (%) 1 %   FiO2  35 %   I Time/ I Time % 1.05 s   Minute Volume (L/min) 13 Liters   Mask Leak (lpm) 3 lpm   Patient's Home Machine No   Alarm Settings   Alarms On Y   Oxygen Therapy/Pulse Ox   O2 Device PAP (positive airway pressure)   Blood Gas  Performed? Yes   $ABG $Arterial Puncture   Sony's Test #1 Pos   Site #1 Left Radial   Site Prepped #1 Yes   Number of Attempts #1 1   Pressure Held #1 Yes   Complications #1 None   Post-procedure #1 Standard   Specimen Status #1 To lab   How Tolerated? Tolerated well     Pt placed back on bipap in dialysis

## 2025-01-14 NOTE — PROGRESS NOTES
RN came into room to find patient breathing rapidly and cyanotic in the lips. Patient place on bipap, VVS, and MD notified.

## 2025-01-14 NOTE — PROGRESS NOTES
Hospital Medicine Progress Note      Subjective:  BP remains low.  Most recent 80's/60's.  Cardiology reordered the midodrine this morning as scheduled.  We'll see whether he tolerates HD.  I'm pessimistic.  He usually has issues removing enough fluid, then gets symptomatic from pulmonary edema.  We might need to increase the midodrine and/or stop the metoprolol in favor of a rhythm-control approach.        General appearance: No apparent distress, appears stated age.  HEENT: Pupils equal, round.  Conjunctivae/corneas clear.  Neck: No jugular venous distention.   Respiratory:  Normal respiratory effort.  Bilaterally without Wheezes/Rhonchi.  Bibasilar rales.    Cardiovascular: Normal rate and irregular rhythm with normal S1/S2 without murmurs, rubs or gallops.  Abdomen: Soft, non-tender, non-distended with normal bowel sounds.  Musculoskeletal: No cyanosis bilaterally.  No edema peripherally.  Partial R foot amputation.    Skin: No jaundice.  No rashes or lesions.  Neurologic:  Neurovascularly intact without any focal sensory/motor deficits. Cranial nerves: II-XII intact, grossly non-focal.  Psychiatric: Alert and oriented, normal insight.      Presenting Admission History:  The patient is a pleasant 54 Y M with a h/o COPD on 4LNC, HTN, DM2 with partial R foot amputation, multivessel CAD deemed not a candidate for CABG s/p stents (most recently in 10/2024), ischemic cardiomyopathy with EF 35-40%, CHF, Afib, CVA, bicuspid AS s/p 2019 TAVR, PAD s/p R iliac stent, and ESRD on HD.  It looks like he was admitted 7x last year, often for chest pain and/or CHF (sometimes misses HD).  He lives at [facility].  The patient presented to [facility] on 1/6 with chest pain and dyspnea.  They saw pulmonary edema and bilateral pleural effusions on his CXR and nephrology was consulted for HD.  Cardiology was consulted because of the elevated troponin and recommended transfer to [facility] for interventional cardiology eval.  Upon   Temp 97.6 °F (36.4 °C) (Axillary)   Resp 22   Ht 1.753 m (5' 9.02\")   Wt 101.9 kg (224 lb 9.6 oz)   SpO2 93%   BMI 33.15 kg/m²     Telemetry:      Personally reviewed and interpreted telemetry (Rhythm Strip) on 1/14/2025 with the following findings:     Diet: ADULT DIET; Regular; 4 carb choices (60 gm/meal); Low Sodium (2 gm); Low Potassium (Less than 3000 mg/day); 1000 ml    DVT Prophylaxis: []PPx LMWH  []SQ Heparin  []IPC/SCDs  []Eliquis  []Xarelto  []Coumadin  [] Heparin Drip  []Other -      Code status: Full Code    PT/OT Eval Status:   []NOT yet ordered  []Ordered and Pending   []Seen with Recommendations for:   []Home independently  []Home w/ assist  []HHC  []SNF  []Acute Rehab    Multi-Disciplinary Rounds with Case Management completed on 1/14/2025 with the following recommendations:  Anticipated Discharge Location: []Home w/ []HHC vs []SNF  []Acute Rehab  []LTAC  []Hospice  []Other -    Anticipated Discharge Day/Date:    Barriers to Discharge:   --------------------------------------------------    MDM (any 2 required for High level billing)    A. Problems (any 1)  [] Acute/Chronic Illness/injury posing ongoing threat to life and/or bodily function without ongoing treatment    [] Severe exacerbation of chronic illness    --------------------------------------------------  B. Risk of Treatment (any 1)    [] Drugs/treatments that require intensive monitoring for toxicity    [] IV ABX (Vancomycin, Aminoglycosides, etc)     [] Post-Cath/Contrast study requiring serial monitoring    [] IV Narcotic analgesia    [] Aggressive IV diuresis    [] Hypertonic Saline    [] Critical electrolyte abnormalities requiring IV replacement    [] Insulin - Scheduled/SSI or Insulin gtt    [] Anticoagulation (Heparin gtt or Coumadin - other anticoagulants in special circumstances)    [] Cardiac Medications (IV Amiodarone/Diltiazem, Tikosyn, etc)    [] Hemodialysis    [] Other -    [] Change in code status    [] Decision to

## 2025-01-14 NOTE — PROGRESS NOTES
01/14/25 0344   NIV Type   $NIV $Daily Charge   NIV Started/Stopped On   Equipment Type v60   Mode Bilevel   Mask Type Full face mask   Mask Size Medium   Assessment   Respirations 24   Settings/Measurements   IPAP 14 cmH20   CPAP/EPAP 5 cmH2O   Vt (Measured) 636 mL   Rate Ordered 10   FiO2  35 %   Patient's Home Machine No   Alarm Settings   Alarms On Y   Low Pressure (cmH2O) 6 cmH2O   High Pressure (cmH2O) 30 cmH2O

## 2025-01-14 NOTE — PLAN OF CARE
CHF Care Plan      Patient's EF (Ejection Fraction) is less than 40%    Heart Failure Medications:  Diuretics:: Furosemide, Torsemide, Spironolactone, Metalozone, and Other    (One of the following REQUIRED for EF </= 40%/SYSTOLIC FAILURE but MAY be used in EF% >40%/DIASTOLIC FAILURE)        ACE:: None        ARB:: None         ARNI:: None    (Beta Blockers)  NON- Evidenced Based Beta Blocker (for EF% >40%/DIASTOLIC FAILURE): Metoprolol TARTrate- Lopressor    Evidenced Based Beta Blocker::(REQUIRED for EF% <40%/SYSTOLIC FAILURE) None  ...................................................................................................................................................    Failed to redirect to the Timeline version of the [employer] SmartLink.      Patient's weights and intake/output reviewed    Daily Weight log at bedside, patient/family participation in use of log: \"yes  0  Patient's current weight today shows a difference of 0 lbs less than last documented weight.      Intake/Output Summary (Last 24 hours) at 1/14/2025 1149  Last data filed at 1/13/2025 2315  Gross per 24 hour   Intake 422 ml   Output --   Net 422 ml       Education Booklet Provided: yes    Comorbidities Reviewed Yes    Patient has a past medical history of Ambulatory dysfunction, Aortic stenosis, Arthritis, Asthma, Bilateral hilar adenopathy syndrome, CAD (coronary artery disease), Cardiomyopathy (HCC), CHF, EF 35-40% (July 2024), Chronic pain, COPD (chronic obstructive pulmonary disease) (HCC), CVA (cerebral vascular accident) (HCC), Depression, Diabetes mellitus (HCC), Difficult intravenous access, Emphysema of lung (HCC), ESRD (end stage renal disease) on dialysis (HCC), Fear of needles, Gastric ulcer, GERD (gastroesophageal reflux disease), HD, T/R/Sa, Heart valve problem, History of spinal fracture, History of transcatheter aortic valve replacement (TAVR), Hx of blood clots, Hyperlipidemia, Hypertension, MI (myocardial infarction)  (HCC), Neuromuscular disorder (HCC), Numbness and tingling in left arm, Pneumonia, PONV (postoperative nausea and vomiting), Prolonged emergence from general anesthesia, Sleep apnea, Stroke (HCC), TIA (transient ischemic attack), and Unspecified diseases of blood and blood-forming organs.     >>For CHF and Comorbidity documentation on Education Time and Topics, please see Education Tab      CHF Education    Learners:  Patient  Readineess:   Acceptance  Method:   Explanation  Response:    Verbalizes Understanding and Needs Reinforcement    Comments: None     Time Spent: 5 min       Pt sitting in bed at this time on  4 L O2. Pt with complaints of shortness of breath. Pt with nonpitting lower extremity edema.     Patient and/or Family's stated Goal of Care this Admission: reduce shortness of breath, increase activity tolerance, better understand heart failure and disease management, be more comfortable, and reduce lower extremity edema prior to discharge        :

## 2025-01-14 NOTE — PROGRESS NOTES
HD called for patient. Rn requested HD RN come take a look at patient due to being labored breathing, cyanotic, and placed on bipap awaiting response for MD. HD stated patient will be moved to last time slot.

## 2025-01-14 NOTE — PLAN OF CARE
Problem: Chronic Conditions and Co-morbidities  Goal: Patient's chronic conditions and co-morbidity symptoms are monitored and maintained or improved  Outcome: Progressing     Problem: Pain  Goal: Verbalizes/displays adequate comfort level or baseline comfort level  Outcome: Progressing     Problem: Safety - Adult  Goal: Free from fall injury  Outcome: Progressing     Problem: Neurosensory - Adult  Goal: Achieves stable or improved neurological status  Outcome: Progressing  Goal: Absence of seizures  Outcome: Progressing  Goal: Remains free of injury related to seizures activity  Outcome: Progressing  Goal: Achieves maximal functionality and self care  Outcome: Progressing     Problem: Respiratory - Adult  Goal: Achieves optimal ventilation and oxygenation  Outcome: Progressing     Problem: Cardiovascular - Adult  Goal: Maintains optimal cardiac output and hemodynamic stability  Outcome: Progressing  Goal: Absence of cardiac dysrhythmias or at baseline  Outcome: Progressing     Problem: Skin/Tissue Integrity - Adult  Goal: Skin integrity remains intact  Outcome: Progressing  Goal: Incisions, wounds, or drain sites healing without S/S of infection  Outcome: Progressing  Goal: Oral mucous membranes remain intact  Outcome: Progressing     Problem: Musculoskeletal - Adult  Goal: Return mobility to safest level of function  Outcome: Progressing  Goal: Maintain proper alignment of affected body part  Outcome: Progressing  Goal: Return ADL status to a safe level of function  Outcome: Progressing

## 2025-01-14 NOTE — CARE COORDINATION
Cm update; LOS # 7; IM, Nephrology, Cardiology, Respiratory. Per IM patient needs to stabilize prior to discharge.Cardiology will be treating medially no procedures recommended at this time. Discharge 1-2 days patient is LTC at [facility] and can return no barriers .Belkis Mcdaniel RN

## 2025-01-14 NOTE — DISCHARGE INSTR - COC
Continuity of Care Form    Patient Name: Igor Ann   :  1970  MRN:  [id number]    Admit date:  2025  Discharge date:  ***    Code Status Order: Full Code   Advance Directives:   Advance Care Flowsheet Documentation             Admitting Physician:  Taz Hernandez MD  PCP: Vonnie Cleveland APRN - NP    Discharging Nurse: ***  Discharging Hospital Unit/Room#: 0218/0218-01  Discharging Unit Phone Number: ***    Emergency Contact:   Extended Emergency Contact Information  Primary Emergency Contact: Crystal Ann  Address: [address]                      [place] [address] [facility]  Home Phone: [phone]  Mobile Phone: [phone]  Relation: Spouse  Secondary Emergency Contact: Sridevi Salmeron           [place], [place]  Home Phone: [phone]  Mobile Phone: [phone]  Relation: Brother/Sister  Preferred language: English   needed? No    Past Surgical History:  Past Surgical History:   Procedure Laterality Date    AORTIC VALVE REPLACEMENT N/A 10/15/2019    TRANSCATHETER AORTIC VALVE REPLACEMENT FEMORAL APPROACH performed by Dion Holland MD at [facility] CVOR    CARDIAC PROCEDURE N/A 7/10/2024    Left and right heart cath / coronary angiography performed by Lamberto Pate MD at [facility] CARDIAC CATH LAB    CARDIAC PROCEDURE N/A 10/21/2024    High risk percutaneous coronary intervention performed by Lamberto Pate MD at [facility] CARDIAC CATH LAB    CARDIAC PROCEDURE N/A 10/21/2024    Percutaneous coronary intervention performed by Lamberto Pate MD at [facility] CARDIAC CATH LAB    CARDIAC PROCEDURE N/A 10/21/2024    Insert temporary pacemaker performed by Lamberto Pate MD at [facility] CARDIAC CATH LAB    CARDIAC PROCEDURE N/A 10/21/2024    Intravascular ultrasound performed by Lamberto Pate MD at [facility] CARDIAC CATH LAB    CARDIAC PROCEDURE N/A 10/21/2024    Atherectomy coronary performed by Lamberto Pate MD at [facility] CARDIAC CATH LAB    CARDIAC PROCEDURE N/A 10/21/2024    Address:  Phone:  Fax:    Dialysis Facility (if applicable)   Name:  Address:  Dialysis Schedule:  Phone:  Fax:    / signature: {Esignature:[id number]}    PHYSICIAN SECTION    Prognosis: {Prognosis:3208051763}    Condition at Discharge: { Patient Condition:879124749}    Rehab Potential (if transferring to Rehab): {Prognosis:6812112851}    Recommended Labs or Other Treatments After Discharge: ***    Physician Certification: I certify the above information and transfer of Igor Ann  is necessary for the continuing treatment of the diagnosis listed and that he requires {Admit to Appropriate Level of Care:49605} for {GREATER/LESS:300156282} 30 days.     Update Admission H&P: {CHP DME Changes in HandP:132285332}    PHYSICIAN SIGNATURE:  {Esignature:[id number]}

## 2025-01-14 NOTE — PROGRESS NOTES
01/13/25 6571   NIV Type   NIV Started/Stopped On   Equipment Type v60   Mode Bilevel   Mask Type Full face mask   Mask Size Medium   Assessment   Respirations 25   Settings/Measurements   IPAP 14 cmH20   CPAP/EPAP 5 cmH2O   Vt (Measured) 411 mL   Rate Ordered 10   FiO2  35 %   Mask Leak (lpm) 23 lpm   Patient's Home Machine No   Alarm Settings   Alarms On Y   Low Pressure (cmH2O) 6 cmH2O   High Pressure (cmH2O) 30 cmH2O

## 2025-01-14 NOTE — PROGRESS NOTES
01/13/25 2001   NIV Type   NIV Started/Stopped On   Equipment Type v60   Mode Bilevel   Mask Type Full face mask   Mask Size Medium   Assessment   Respirations 29   Settings/Measurements   IPAP 14 cmH20   CPAP/EPAP 5 cmH2O   Vt (Measured) 647 mL   Rate Ordered 10   FiO2  35 %   Mask Leak (lpm) 14 lpm   Patient's Home Machine No   Alarm Settings   Alarms On Y   Low Pressure (cmH2O) 6 cmH2O   High Pressure (cmH2O) 30 cmH2O

## 2025-01-14 NOTE — PROGRESS NOTES
[facility]   Daily Progress Note      Admit Date:  1/7/2025    Reason for follow up visit: Hypotension; atrial fib with RVR and elevated troponin    CC: Lethargic this AM (received Seroquel last night) and difficult to awaken him to answer questions    53 y/o male with PMH notable for  CAD/PATRICIA to LAD in 2015,PATRICIA to ramus, PATRICIA to LCX and PATRICIA to RCA previously/laser atherectomy and shockwave coronary lithotripsy and drug coated balloon to RCA with borderline significant disease in LAD with stent protrusion Dg1 into LAD on 10/21/24,  Aortic stenosis and S/P TAVR in 10/2019, HFrEF, ischemic cardiomyopathy, HTN, PAF, PVD (PTA and stent to REIA on 5/2019), ESRD, CVA, DM, ZAINAB, COPD on home O2 and noncompliance who was admitted to [facility] with chest pain. Elevated troponin and atrial fib with RVR. Asked to revisit on 1/13/25 for hypotension.     Interval History:  Pt. seen and examined; records reviewed  BP remains hypotensive at times; labile  Received Seroquel last night and difficult to arouse  Midodrine added this AM  Wt stable at 224#  Plan for dialysis today  Remains in atrial fib with VR 90's-100's; up to 140's transiently at times.  No complaints this AM  Unable to push GDMT d/t hypotension    Subjective:  Pt with no acute overnight cardiac events. Denies chest pain, palpitations, and dyspnea.  Unable to obtain ROS from pt this AM    Objective:   BP 98/66   Pulse 92   Temp 97.5 °F (36.4 °C) (Axillary)   Resp 18   Ht 1.753 m (5' 9.02\")   Wt 101.9 kg (224 lb 9.6 oz)   SpO2 96%   BMI 33.15 kg/m²     Intake/Output Summary (Last 24 hours) at 1/14/2025 0925  Last data filed at 1/13/2025 2315  Gross per 24 hour   Intake 862 ml   Output 0 ml   Net 862 ml     Wt Readings from Last 3 Encounters:   01/14/25 101.9 kg (224 lb 9.6 oz)   01/06/25 99.8 kg (220 lb)   12/29/24 100.8 kg (222 lb 3.2 oz)       Physical Exam:  General: In no acute distress. Drowsy. Resting comfortably in bed. O2 in place  Skin:  Warm and

## 2025-01-14 NOTE — PROGRESS NOTES
01/14/25 1322   Oxygen Therapy/Pulse Ox   O2 Device PAP (positive airway pressure)   FiO2  35 %   Blood Gas  Performed? Yes   $ABG $Arterial Puncture   Sony's Test #1 Pos   Site #1 Left Radial   Site Prepped #1 Yes   Number of Attempts #1 1   Pressure Held #1 Yes   Complications #1 None   Post-procedure #1 Standard   Specimen Status #1 To lab   How Tolerated? Tolerated well

## 2025-01-14 NOTE — PROGRESS NOTES
Nephrology Progress Note   [employer]      Sub/interval history  Igor Ann is a 54 year old male being seen for ESKD on HD.    Patient was seen and examined at bedside. He appears more confused today. Moaning and asking for help repeatedly even with me in the room and reassuring him.  He denies any pain, remains on supplemental O2. BP continues to run low overnight, improved this morning.   - Labs and Notes reviewed   - PMShx reviewed  - Last HD 1/11/25, 2L UF, .2kg    ROS: No fever or chills.  Social: No family at bedside.    Scheduled Meds:   albumin human 25%  25 g IntraVENous Once in dialysis    midodrine  5 mg Oral TID WC    insulin glargine  14 Units SubCUTAneous Nightly    metoprolol tartrate  25 mg Oral BID    ranolazine  500 mg Oral BID    apixaban  5 mg Oral BID    atorvastatin  80 mg Oral Daily    busPIRone  10 mg Oral TID    calcium acetate  2 capsule Oral TID    clopidogrel  75 mg Oral Daily    DULoxetine  60 mg Oral Daily    [Held by provider] isosorbide mononitrate  30 mg Oral Daily    pantoprazole  40 mg Oral Daily    pregabalin  25 mg Oral Daily    [Held by provider] sacubitril-valsartan  1 tablet Oral BID    sodium chloride flush  5-40 mL IntraVENous 2 times per day    insulin lispro  0-8 Units SubCUTAneous 4x Daily AC & HS     Continuous Infusions:   sodium chloride      dextrose       PRN Meds:.oxyCODONE, dilTIAZem, heparin (porcine), ipratropium 0.5 mg-albuterol 2.5 mg, midodrine, sodium chloride flush, sodium chloride, polyethylene glycol, acetaminophen **OR** acetaminophen, prochlorperazine, glucose, dextrose bolus **OR** dextrose bolus, glucagon (rDNA), dextrose    Objective/     Vitals:    01/14/25 0625 01/14/25 0645 01/14/25 0845 01/14/25 1015   BP:  (!) 86/60 98/66 108/88   Pulse:  (!) 101 92    Resp:  22 18    Temp:  97.6 °F (36.4 °C) 97.5 °F (36.4 °C)    TempSrc:  Axillary Axillary    SpO2:  93% 96%    Weight: 101.9 kg (224 lb 9.6 oz)      Height:         24HR

## 2025-01-14 NOTE — PLAN OF CARE
CHF Care Plan      Patient's EF (Ejection Fraction) is less than 40%    Heart Failure Medications:  Diuretics:: None    (One of the following REQUIRED for EF </= 40%/SYSTOLIC FAILURE but MAY be used in EF% >40%/DIASTOLIC FAILURE)        ACE:: None        ARB:: None         ARNI:: None    (Beta Blockers)  NON- Evidenced Based Beta Blocker (for EF% >40%/DIASTOLIC FAILURE): None    Evidenced Based Beta Blocker::(REQUIRED for EF% <40%/SYSTOLIC FAILURE) None  ...................................................................................................................................................    Failed to redirect to the Timeline version of the [employer] SmartLink.      Patient's weights and intake/output reviewed    Daily Weight log at bedside, patient/family participation in use of log: \"yes    Patient's current weight today shows a difference of 2 lbs lbs more than last documented weight.      Intake/Output Summary (Last 24 hours) at 1/14/2025 0643  Last data filed at 1/13/2025 2315  Gross per 24 hour   Intake 862 ml   Output 0 ml   Net 862 ml       Education Booklet Provided: yes    Comorbidities Reviewed Yes    Patient has a past medical history of Ambulatory dysfunction, Aortic stenosis, Arthritis, Asthma, Bilateral hilar adenopathy syndrome, CAD (coronary artery disease), Cardiomyopathy (HCC), CHF, EF 35-40% (July 2024), Chronic pain, COPD (chronic obstructive pulmonary disease) ([facility]), CVA (cerebral vascular accident) ([facility]), Depression, Diabetes mellitus (HCC), Difficult intravenous access, Emphysema of lung (HCC), ESRD (end stage renal disease) on dialysis ([facility]), Fear of needles, Gastric ulcer, GERD (gastroesophageal reflux disease), HD, T/R/Sa, Heart valve problem, History of spinal fracture, History of transcatheter aortic valve replacement (TAVR), Hx of blood clots, Hyperlipidemia, Hypertension, MI (myocardial infarction) (HCC), Neuromuscular disorder (HCC), Numbness and tingling in left arm,

## 2025-01-14 NOTE — FLOWSHEET NOTE
01/14/25 1330 01/14/25 1736   Vital Signs   BP (!) 141/66 113/68   Temp 98.3 °F (36.8 °C) 98.8 °F (37.1 °C)   Pulse 65 61   Respirations 21 20       Treatment time: 3.5 hours    Net UF: 1.2 L    Pre weight: 103.2 kg (bed scale)  Post weight: 102 kg (bed scale)  EDW: 100 kg    Access used: LIJ cath  Access function: no problems    Medications or blood products given: Albumin 12.5 Gm    Regular outpatient schedule: [facility]    Summary of response to treatment: BP dropped to 64/31.  Tx stopped, rinsed back with 250 ml NS, SBP 70's, an additional 800 ml NS given, to get BP to 101/38.  Albumin 12.5 Gm IVPB given mid tx.     Copy of dialysis treatment record placed in chart, to be scanned into EMR.    Report called to Ginny Kilgore RN

## 2025-01-14 NOTE — PROGRESS NOTES
Podiatry Progress Note    Subjective:   Patient seen at bedside this evening.  No new pedal complaints.     Objective:   Vitals:  BP (!) 141/66   Pulse 65   Temp 98.3 °F (36.8 °C) (Axillary)   Resp 21   Ht 1.753 m (5' 9.02\")   Wt 103.2 kg (227 lb 8.2 oz)   SpO2 96%   BMI 33.58 kg/m²   Integument:  Full thickness ulceration at the lateral left fifth metatarsal head.  It measures approximately 0.5cm x 0.8cm x 0.2cm.  The base was granular.  No active drainage.  No surrounding erythema.  No purulence. The wound did not probe or undermine.  No acute signs of infection.  Otherwise, skin was warm, dry and supple, bilateral.   Neurologic:  Protective sensation is grossly diminished to light touch at the level of the distal foot, bilateral.  Vascular:  DP and PT pulses are palpable, bilateral.  CFT is brisk to the distal foot, bilateral.  No significant edema appreciated.  Musculoskeletal:  TMA on the right.  No gross musculoskeletal deformities on the left.     Labs:   CBC with Differential:    Lab Results   Component Value Date/Time    WBC 11.0 01/14/2025 08:55 AM    RBC 3.47 01/14/2025 08:55 AM    HGB 9.4 01/14/2025 08:55 AM    HCT 29.2 01/14/2025 08:55 AM     01/14/2025 08:55 AM    MCV 84.1 01/14/2025 08:55 AM    MCH 27.0 01/14/2025 08:55 AM    MCHC 32.1 01/14/2025 08:55 AM    RDW 19.9 01/14/2025 08:55 AM    BANDSPCT 2 01/07/2025 08:42 AM    METASPCT 1 04/07/2024 05:12 AM    LYMPHOPCT 2.0 01/07/2025 08:42 AM    MONOPCT 13.0 01/07/2025 08:42 AM    MYELOPCT 1 04/07/2024 05:12 AM    EOSPCT 0.0 01/07/2025 08:42 AM    BASOPCT 1.0 01/07/2025 08:42 AM    MONOSABS 1.3 01/07/2025 08:42 AM    LYMPHSABS 0.2 01/07/2025 08:42 AM    EOSABS 0.0 01/07/2025 08:42 AM    BASOSABS 0.1 01/07/2025 08:42 AM    DIFFTYPE Auto 05/17/2013 06:50 AM     CMP:    Lab Results   Component Value Date/Time     01/14/2025 04:17 AM    K 6.0 01/14/2025 04:17 AM    CL 90 01/14/2025 04:17 AM    CO2 14 01/14/2025 04:17 AM    BUN 73

## 2025-01-14 NOTE — PROGRESS NOTES
01/14/25 1207   NIV Type   NIV Started/Stopped On   Equipment Type v60   Mode Bilevel   Mask Type Full face mask   Mask Size Medium   Assessment   Pulse 68   Heart Rate Source Monitor   Respirations (!) 32   SpO2 96 %   Level of Consciousness 1.0   Comfort Level Good   Using Accessory Muscles No   Mask Compliance Good   Skin Assessment Clean, dry, & intact   Skin Protection for O2 Device Yes   Intervention(s) Skin Barrier   Settings/Measurements   PIP Observed 16 cm H20   IPAP 14 cmH20   CPAP/EPAP 5 cmH2O   Vt (Measured) 561 mL   Rate Ordered 10   Insp Rise Time (%) 1 %   FiO2  35 %   I Time/ I Time % 1 s   Minute Volume (L/min) 18 Liters   Mask Leak (lpm) 6 lpm   Patient's Home Machine No   Alarm Settings   Alarms On Y

## 2025-01-14 NOTE — PROGRESS NOTES
01/14/25 1634   NIV Type   NIV Started/Stopped On   Equipment Type v60   Mode Bilevel   Mask Type Full face mask   Mask Size Medium   Assessment   Respirations 22   Settings/Measurements   PIP Observed 15 cm H20   IPAP 14 cmH20   CPAP/EPAP 5 cmH2O   Vt (Measured) 518 mL   Rate Ordered 10   Insp Rise Time (%) 1 %   FiO2  35 %   I Time/ I Time % 1.05 s   Minute Volume (L/min) 12.7 Liters   Mask Leak (lpm) 26 lpm   Patient's Home Machine No   Alarm Settings   Alarms On Y     Pt seen in dialysis

## 2025-01-15 ENCOUNTER — APPOINTMENT (OUTPATIENT)
Dept: GENERAL RADIOLOGY | Age: 55
End: 2025-01-15
Attending: INTERNAL MEDICINE
Payer: MEDICARE

## 2025-01-15 ENCOUNTER — APPOINTMENT (OUTPATIENT)
Age: 55
End: 2025-01-15
Attending: INTERNAL MEDICINE
Payer: MEDICARE

## 2025-01-15 LAB
ACANTHOCYTES BLD QL SMEAR: ABNORMAL
ALBUMIN SERPL-MCNC: 3.4 G/DL (ref 3.4–5)
ALBUMIN SERPL-MCNC: 3.4 G/DL (ref 3.4–5)
ALBUMIN/GLOB SERPL: 1.1 {RATIO} (ref 1.1–2.2)
ALP SERPL-CCNC: 113 U/L (ref 40–129)
ALT SERPL-CCNC: 15 U/L (ref 10–40)
ANION GAP SERPL CALCULATED.3IONS-SCNC: 23 MMOL/L (ref 3–16)
ANION GAP SERPL CALCULATED.3IONS-SCNC: 26 MMOL/L (ref 3–16)
ANISOCYTOSIS BLD QL SMEAR: ABNORMAL
AST SERPL-CCNC: 70 U/L (ref 15–37)
BASE EXCESS BLDA CALC-SCNC: -11 MMOL/L (ref -3–3)
BASE EXCESS BLDA CALC-SCNC: -11 MMOL/L (ref -3–3)
BASE EXCESS BLDA CALC-SCNC: -9 MMOL/L (ref -3–3)
BASE EXCESS BLDA CALC-SCNC: -9 MMOL/L (ref -3–3)
BASOPHILS # BLD: 0 K/UL (ref 0–0.2)
BASOPHILS NFR BLD: 0 %
BILIRUB SERPL-MCNC: 0.6 MG/DL (ref 0–1)
BUN SERPL-MCNC: 30 MG/DL (ref 7–20)
BUN SERPL-MCNC: 42 MG/DL (ref 7–20)
CA-I BLD-SCNC: 1.11 MMOL/L (ref 1.12–1.32)
CA-I BLD-SCNC: 1.14 MMOL/L (ref 1.12–1.32)
CA-I BLD-SCNC: 1.16 MMOL/L (ref 1.12–1.32)
CA-I BLD-SCNC: 1.18 MMOL/L (ref 1.12–1.32)
CA-I BLD-SCNC: 1.2 MMOL/L (ref 1.12–1.32)
CALCIUM SERPL-MCNC: 8.8 MG/DL (ref 8.3–10.6)
CALCIUM SERPL-MCNC: 9 MG/DL (ref 8.3–10.6)
CHLORIDE SERPL-SCNC: 93 MMOL/L (ref 99–110)
CHLORIDE SERPL-SCNC: 97 MMOL/L (ref 99–110)
CO2 BLDA-SCNC: 17 MMOL/L
CO2 BLDA-SCNC: 18 MMOL/L
CO2 SERPL-SCNC: 16 MMOL/L (ref 21–32)
CO2 SERPL-SCNC: 16 MMOL/L (ref 21–32)
CREAT SERPL-MCNC: 2.8 MG/DL (ref 0.9–1.3)
CREAT SERPL-MCNC: 5.2 MG/DL (ref 0.9–1.3)
DEPRECATED RDW RBC AUTO: 20.3 % (ref 12.4–15.4)
ECHO BSA: 2.23 M2
ECHO EST RA PRESSURE: 8 MMHG
ECHO IVC PROX: 2.6 CM
ECHO LV EDV A2C: 164 ML
ECHO LV EDV A4C: 175 ML
ECHO LV EDV INDEX A4C: 82 ML/M2
ECHO LV EDV NDEX A2C: 77 ML/M2
ECHO LV EF PHYSICIAN: 20 %
ECHO LV EJECTION FRACTION A2C: 21 %
ECHO LV EJECTION FRACTION A4C: 21 %
ECHO LV EJECTION FRACTION BIPLANE: 23 % (ref 55–100)
ECHO LV ESV A2C: 129 ML
ECHO LV ESV A4C: 137 ML
ECHO LV ESV INDEX A2C: 60 ML/M2
ECHO LV ESV INDEX A4C: 64 ML/M2
ECHO LV FRACTIONAL SHORTENING: 13 % (ref 28–44)
ECHO LV INTERNAL DIMENSION DIASTOLE INDEX: 2.9 CM/M2
ECHO LV INTERNAL DIMENSION DIASTOLIC: 6.2 CM (ref 4.2–5.9)
ECHO LV INTERNAL DIMENSION SYSTOLIC INDEX: 2.52 CM/M2
ECHO LV INTERNAL DIMENSION SYSTOLIC: 5.4 CM
ECHO LV IVSD: 1 CM (ref 0.6–1)
ECHO LV MASS 2D: 278 G (ref 88–224)
ECHO LV MASS INDEX 2D: 129.9 G/M2 (ref 49–115)
ECHO LV POSTERIOR WALL DIASTOLIC: 1.1 CM (ref 0.6–1)
ECHO LV RELATIVE WALL THICKNESS RATIO: 0.35
ECHO RV INTERNAL DIMENSION: 4.2 CM
EKG DIAGNOSIS: NORMAL
EKG Q-T INTERVAL: 324 MS
EKG QRS DURATION: 114 MS
EKG QTC CALCULATION (BAZETT): 494 MS
EKG R AXIS: 87 DEGREES
EKG T AXIS: 223 DEGREES
EKG VENTRICULAR RATE: 140 BPM
EOSINOPHIL # BLD: 0 K/UL (ref 0–0.6)
EOSINOPHIL NFR BLD: 0 %
GFR SERPLBLD CREATININE-BSD FMLA CKD-EPI: 12 ML/MIN/{1.73_M2}
GFR SERPLBLD CREATININE-BSD FMLA CKD-EPI: 26 ML/MIN/{1.73_M2}
GLUCOSE BLD-MCNC: 126 MG/DL (ref 70–99)
GLUCOSE BLD-MCNC: 151 MG/DL (ref 70–99)
GLUCOSE BLD-MCNC: 180 MG/DL (ref 70–99)
GLUCOSE BLD-MCNC: 183 MG/DL (ref 70–99)
GLUCOSE BLD-MCNC: 188 MG/DL (ref 70–99)
GLUCOSE BLD-MCNC: 200 MG/DL (ref 70–99)
GLUCOSE BLD-MCNC: 204 MG/DL (ref 70–99)
GLUCOSE BLD-MCNC: 207 MG/DL (ref 70–99)
GLUCOSE BLD-MCNC: 224 MG/DL (ref 70–99)
GLUCOSE SERPL-MCNC: 183 MG/DL (ref 70–99)
GLUCOSE SERPL-MCNC: 195 MG/DL (ref 70–99)
HCO3 BLDA-SCNC: 16.3 MMOL/L (ref 21–29)
HCO3 BLDA-SCNC: 16.4 MMOL/L (ref 21–29)
HCO3 BLDA-SCNC: 17.2 MMOL/L (ref 21–29)
HCO3 BLDA-SCNC: 17.3 MMOL/L (ref 21–29)
HCT VFR BLD AUTO: 27 % (ref 40.5–52.5)
HCT VFR BLD AUTO: 28.7 % (ref 40.5–52.5)
HCT VFR BLD AUTO: 29 % (ref 40.5–52.5)
HCT VFR BLD AUTO: 29 % (ref 40.5–52.5)
HCT VFR BLD AUTO: 30 % (ref 40.5–52.5)
HGB BLD CALC-MCNC: 10.3 GM/DL (ref 13.5–17.5)
HGB BLD CALC-MCNC: 9 GM/DL (ref 13.5–17.5)
HGB BLD CALC-MCNC: 9.7 GM/DL (ref 13.5–17.5)
HGB BLD CALC-MCNC: 9.8 GM/DL (ref 13.5–17.5)
HGB BLD-MCNC: 8.7 G/DL (ref 13.5–17.5)
LACTATE BLD-SCNC: 1.24 MMOL/L (ref 0.4–2)
LACTATE BLD-SCNC: 1.45 MMOL/L (ref 0.4–2)
LACTATE BLD-SCNC: 1.5 MMOL/L (ref 0.4–2)
LACTATE BLD-SCNC: 3.23 MMOL/L (ref 0.4–2)
LACTATE BLDV-SCNC: 1.9 MMOL/L (ref 0.4–2)
LYMPHOCYTES # BLD: 0.5 K/UL (ref 1–5.1)
LYMPHOCYTES NFR BLD: 3 %
MACROCYTES BLD QL SMEAR: ABNORMAL
MAGNESIUM SERPL-MCNC: 2.13 MG/DL (ref 1.8–2.4)
MAGNESIUM SERPL-MCNC: 2.34 MG/DL (ref 1.8–2.4)
MCH RBC QN AUTO: 26.7 PG (ref 26–34)
MCHC RBC AUTO-ENTMCNC: 30.2 G/DL (ref 31–36)
MCV RBC AUTO: 88.4 FL (ref 80–100)
MICROCYTES BLD QL SMEAR: ABNORMAL
MONOCYTES # BLD: 0.8 K/UL (ref 0–1.3)
MONOCYTES NFR BLD: 5 %
NEUTROPHILS # BLD: 14.4 K/UL (ref 1.7–7.7)
NEUTROPHILS NFR BLD: 91 %
NEUTS BAND NFR BLD MANUAL: 1 % (ref 0–7)
NRBC BLD-RTO: 1 /100 WBC
NT-PROBNP SERPL-MCNC: ABNORMAL PG/ML (ref 0–124)
OVALOCYTES BLD QL SMEAR: ABNORMAL
PCO2 BLDA: 33.7 MM HG (ref 35–45)
PCO2 BLDA: 35.8 MM HG (ref 35–45)
PCO2 BLDA: 35.8 MM HG (ref 35–45)
PCO2 BLDA: 40.4 MM HG (ref 35–45)
PERFORMED ON: ABNORMAL
PH BLDA: 7.22 [PH] (ref 7.35–7.45)
PH BLDA: 7.27 [PH] (ref 7.35–7.45)
PH BLDA: 7.29 [PH] (ref 7.35–7.45)
PH BLDA: 7.32 [PH] (ref 7.35–7.45)
PH BLDV: 7.3 [PH] (ref 7.35–7.45)
PHOSPHATE SERPL-MCNC: 3.9 MG/DL (ref 2.5–4.9)
PHOSPHATE SERPL-MCNC: 6.2 MG/DL (ref 2.5–4.9)
PLATELET # BLD AUTO: 113 K/UL (ref 135–450)
PLATELET BLD QL SMEAR: ABNORMAL
PMV BLD AUTO: 9.3 FL (ref 5–10.5)
PO2 BLDA: 110.5 MM HG (ref 75–108)
PO2 BLDA: 136.5 MM HG (ref 75–108)
PO2 BLDA: 55 MM HG (ref 75–108)
PO2 BLDA: 89.5 MM HG (ref 75–108)
POC SAMPLE TYPE: ABNORMAL
POLYCHROMASIA BLD QL SMEAR: ABNORMAL
POTASSIUM BLD-SCNC: 4.7 MMOL/L (ref 3.5–5.1)
POTASSIUM BLD-SCNC: 5.1 MMOL/L (ref 3.5–5.1)
POTASSIUM BLD-SCNC: 5.1 MMOL/L (ref 3.5–5.1)
POTASSIUM BLD-SCNC: 5.2 MMOL/L (ref 3.5–5.1)
POTASSIUM SERPL-SCNC: 4.9 MMOL/L (ref 3.5–5.1)
POTASSIUM SERPL-SCNC: 5.5 MMOL/L (ref 3.5–5.1)
PROT SERPL-MCNC: 6.5 G/DL (ref 6.4–8.2)
RBC # BLD AUTO: 3.24 M/UL (ref 4.2–5.9)
SAO2 % BLDA: 82 % (ref 93–100)
SAO2 % BLDA: 96 % (ref 93–100)
SAO2 % BLDA: 98 % (ref 93–100)
SAO2 % BLDA: 99 % (ref 93–100)
SLIDE REVIEW: ABNORMAL
SODIUM BLD-SCNC: 133 MMOL/L (ref 136–145)
SODIUM BLD-SCNC: 134 MMOL/L (ref 136–145)
SODIUM SERPL-SCNC: 135 MMOL/L (ref 136–145)
SODIUM SERPL-SCNC: 136 MMOL/L (ref 136–145)
SPHEROCYTES BLD QL SMEAR: ABNORMAL
TOXIC GRANULES BLD QL SMEAR: PRESENT
TROPONIN, HIGH SENSITIVITY: 84 NG/L (ref 0–22)
TROPONIN, HIGH SENSITIVITY: 94 NG/L (ref 0–22)
WBC # BLD AUTO: 15.6 K/UL (ref 4–11)

## 2025-01-15 PROCEDURE — 93005 ELECTROCARDIOGRAM TRACING: CPT | Performed by: INTERNAL MEDICINE

## 2025-01-15 PROCEDURE — 36600 WITHDRAWAL OF ARTERIAL BLOOD: CPT

## 2025-01-15 PROCEDURE — 82947 ASSAY GLUCOSE BLOOD QUANT: CPT

## 2025-01-15 PROCEDURE — 36620 INSERTION CATHETER ARTERY: CPT | Performed by: NURSE PRACTITIONER

## 2025-01-15 PROCEDURE — 93010 ELECTROCARDIOGRAM REPORT: CPT | Performed by: INTERNAL MEDICINE

## 2025-01-15 PROCEDURE — 93321 DOPPLER ECHO F-UP/LMTD STD: CPT | Performed by: INTERNAL MEDICINE

## 2025-01-15 PROCEDURE — 80053 COMPREHEN METABOLIC PANEL: CPT

## 2025-01-15 PROCEDURE — 84484 ASSAY OF TROPONIN QUANT: CPT

## 2025-01-15 PROCEDURE — 90945 DIALYSIS ONE EVALUATION: CPT

## 2025-01-15 PROCEDURE — 2000000000 HC ICU R&B

## 2025-01-15 PROCEDURE — 02HV33Z INSERTION OF INFUSION DEVICE INTO SUPERIOR VENA CAVA, PERCUTANEOUS APPROACH: ICD-10-PCS

## 2025-01-15 PROCEDURE — 82803 BLOOD GASES ANY COMBINATION: CPT

## 2025-01-15 PROCEDURE — 2580000003 HC RX 258: Performed by: INTERNAL MEDICINE

## 2025-01-15 PROCEDURE — 2500000003 HC RX 250 WO HCPCS: Performed by: INTERNAL MEDICINE

## 2025-01-15 PROCEDURE — 94761 N-INVAS EAR/PLS OXIMETRY MLT: CPT

## 2025-01-15 PROCEDURE — 83605 ASSAY OF LACTIC ACID: CPT

## 2025-01-15 PROCEDURE — 93308 TTE F-UP OR LMTD: CPT | Performed by: INTERNAL MEDICINE

## 2025-01-15 PROCEDURE — 36556 INSERT NON-TUNNEL CV CATH: CPT

## 2025-01-15 PROCEDURE — 36415 COLL VENOUS BLD VENIPUNCTURE: CPT

## 2025-01-15 PROCEDURE — 94660 CPAP INITIATION&MGMT: CPT

## 2025-01-15 PROCEDURE — 83880 ASSAY OF NATRIURETIC PEPTIDE: CPT

## 2025-01-15 PROCEDURE — 71045 X-RAY EXAM CHEST 1 VIEW: CPT

## 2025-01-15 PROCEDURE — 84295 ASSAY OF SERUM SODIUM: CPT

## 2025-01-15 PROCEDURE — 2580000003 HC RX 258: Performed by: NURSE PRACTITIONER

## 2025-01-15 PROCEDURE — 6370000000 HC RX 637 (ALT 250 FOR IP): Performed by: INTERNAL MEDICINE

## 2025-01-15 PROCEDURE — 84100 ASSAY OF PHOSPHORUS: CPT

## 2025-01-15 PROCEDURE — 6360000002 HC RX W HCPCS: Performed by: INTERNAL MEDICINE

## 2025-01-15 PROCEDURE — 83735 ASSAY OF MAGNESIUM: CPT

## 2025-01-15 PROCEDURE — 5A1D70Z PERFORMANCE OF URINARY FILTRATION, INTERMITTENT, LESS THAN 6 HOURS PER DAY: ICD-10-PCS

## 2025-01-15 PROCEDURE — 84132 ASSAY OF SERUM POTASSIUM: CPT

## 2025-01-15 PROCEDURE — 6360000004 HC RX CONTRAST MEDICATION: Performed by: INTERNAL MEDICINE

## 2025-01-15 PROCEDURE — 99291 CRITICAL CARE FIRST HOUR: CPT | Performed by: INTERNAL MEDICINE

## 2025-01-15 PROCEDURE — 85014 HEMATOCRIT: CPT

## 2025-01-15 PROCEDURE — 04HY32Z INSERTION OF MONITORING DEVICE INTO LOWER ARTERY, PERCUTANEOUS APPROACH: ICD-10-PCS

## 2025-01-15 PROCEDURE — 2700000000 HC OXYGEN THERAPY PER DAY

## 2025-01-15 PROCEDURE — 82330 ASSAY OF CALCIUM: CPT

## 2025-01-15 PROCEDURE — 3E033XZ INTRODUCTION OF VASOPRESSOR INTO PERIPHERAL VEIN, PERCUTANEOUS APPROACH: ICD-10-PCS

## 2025-01-15 PROCEDURE — 2500000003 HC RX 250 WO HCPCS: Performed by: NURSE PRACTITIONER

## 2025-01-15 PROCEDURE — 36556 INSERT NON-TUNNEL CV CATH: CPT | Performed by: NURSE PRACTITIONER

## 2025-01-15 PROCEDURE — 6360000002 HC RX W HCPCS: Performed by: NURSE PRACTITIONER

## 2025-01-15 PROCEDURE — 36620 INSERTION CATHETER ARTERY: CPT

## 2025-01-15 PROCEDURE — C8924 2D TTE W OR W/O FOL W/CON,FU: HCPCS

## 2025-01-15 PROCEDURE — 85025 COMPLETE CBC W/AUTO DIFF WBC: CPT

## 2025-01-15 RX ORDER — MUPIROCIN 20 MG/G
OINTMENT TOPICAL 2 TIMES DAILY
Status: DISCONTINUED | OUTPATIENT
Start: 2025-01-15 | End: 2025-01-16

## 2025-01-15 RX ORDER — DOBUTAMINE HYDROCHLORIDE 200 MG/100ML
2.5 INJECTION INTRAVENOUS CONTINUOUS
Status: DISCONTINUED | OUTPATIENT
Start: 2025-01-15 | End: 2025-01-16

## 2025-01-15 RX ORDER — CALCIUM GLUCONATE 20 MG/ML
1000 INJECTION, SOLUTION INTRAVENOUS PRN
Status: DISCONTINUED | OUTPATIENT
Start: 2025-01-15 | End: 2025-01-16

## 2025-01-15 RX ORDER — POTASSIUM CHLORIDE 29.8 MG/ML
20 INJECTION INTRAVENOUS PRN
Status: DISCONTINUED | OUTPATIENT
Start: 2025-01-15 | End: 2025-01-16

## 2025-01-15 RX ORDER — CALCIUM GLUCONATE 20 MG/ML
2000 INJECTION, SOLUTION INTRAVENOUS PRN
Status: DISCONTINUED | OUTPATIENT
Start: 2025-01-15 | End: 2025-01-16

## 2025-01-15 RX ORDER — SODIUM CHLORIDE 0.9 % (FLUSH) 0.9 %
1.1-1.9 SYRINGE (ML) INJECTION PRN
Status: DISCONTINUED | OUTPATIENT
Start: 2025-01-15 | End: 2025-01-16 | Stop reason: HOSPADM

## 2025-01-15 RX ORDER — VANCOMYCIN 2 G/400ML
2000 INJECTION, SOLUTION INTRAVENOUS ONCE
Status: COMPLETED | OUTPATIENT
Start: 2025-01-15 | End: 2025-01-15

## 2025-01-15 RX ORDER — VANCOMYCIN 1 G/200ML
1000 INJECTION, SOLUTION INTRAVENOUS EVERY 12 HOURS
Status: DISCONTINUED | OUTPATIENT
Start: 2025-01-16 | End: 2025-01-15 | Stop reason: SDUPTHER

## 2025-01-15 RX ORDER — NOREPINEPHRINE BITARTRATE 0.06 MG/ML
1-100 INJECTION, SOLUTION INTRAVENOUS CONTINUOUS
Status: DISCONTINUED | OUTPATIENT
Start: 2025-01-15 | End: 2025-01-16

## 2025-01-15 RX ORDER — MAGNESIUM SULFATE 1 G/100ML
1000 INJECTION INTRAVENOUS PRN
Status: DISCONTINUED | OUTPATIENT
Start: 2025-01-15 | End: 2025-01-16

## 2025-01-15 RX ADMIN — AMIODARONE HYDROCHLORIDE 1 MG/MIN: 1.8 INJECTION, SOLUTION INTRAVENOUS at 15:55

## 2025-01-15 RX ADMIN — CEFEPIME 2000 MG: 2 INJECTION, POWDER, FOR SOLUTION INTRAVENOUS at 16:11

## 2025-01-15 RX ADMIN — Medication: at 14:03

## 2025-01-15 RX ADMIN — INSULIN LISPRO 2 UNITS: 100 INJECTION, SOLUTION INTRAVENOUS; SUBCUTANEOUS at 19:00

## 2025-01-15 RX ADMIN — AMIODARONE HYDROCHLORIDE 0.5 MG/MIN: 1.8 INJECTION, SOLUTION INTRAVENOUS at 22:10

## 2025-01-15 RX ADMIN — CEFEPIME 2000 MG: 2 INJECTION, POWDER, FOR SOLUTION INTRAVENOUS at 23:39

## 2025-01-15 RX ADMIN — Medication: at 17:26

## 2025-01-15 RX ADMIN — Medication: at 10:40

## 2025-01-15 RX ADMIN — Medication: at 15:47

## 2025-01-15 RX ADMIN — SODIUM CHLORIDE, PRESERVATIVE FREE 10 ML: 5 INJECTION INTRAVENOUS at 21:00

## 2025-01-15 RX ADMIN — SULFUR HEXAFLUORIDE 2 ML: KIT at 13:49

## 2025-01-15 RX ADMIN — Medication: at 23:36

## 2025-01-15 RX ADMIN — NOREPINEPHRINE BITARTRATE 2 MCG/MIN: 64 SOLUTION INTRAVENOUS at 14:40

## 2025-01-15 RX ADMIN — Medication: at 21:05

## 2025-01-15 RX ADMIN — SODIUM CHLORIDE: 9 INJECTION, SOLUTION INTRAVENOUS at 14:43

## 2025-01-15 RX ADMIN — VANCOMYCIN 2000 MG: 2 INJECTION, SOLUTION INTRAVENOUS at 15:58

## 2025-01-15 RX ADMIN — DOBUTAMINE IN DEXTROSE 2.5 MCG/KG/MIN: 200 INJECTION, SOLUTION INTRAVENOUS at 16:14

## 2025-01-15 RX ADMIN — Medication: at 21:04

## 2025-01-15 RX ADMIN — Medication: at 13:23

## 2025-01-15 RX ADMIN — VASOPRESSIN IN 0.9% SODIUM CHLORIDE 0.03 UNITS/MIN: 20 INJECTION INTRAVENOUS at 17:28

## 2025-01-15 RX ADMIN — INSULIN GLARGINE 14 UNITS: 100 INJECTION, SOLUTION SUBCUTANEOUS at 23:09

## 2025-01-15 NOTE — PROGRESS NOTES
Nephrology Progress Note   [facility]      Sub/interval history  Igor Ann is a 54 year old male being seen for ESKD on HD.    Patient was seen and examined in ICU. Rapid response called this morning and he was transferred to the unit.  He is lethargic and minimal responsiveness. Currently on BiPAP. Lactic 3.3. BP has improved in the ICU, SBPs 100-110s, remains tachy in the 110-130s.  He was able to complete 3.5 hours of HD yesterday afternoon, however treatment ended early due to hypotension.   - Labs and Notes reviewed   - PMShx reviewed  - Last HD 1/14/25, 1.2L UF, PW 102kg    ROS: No fever or chills.  Social: No family at bedside.    Scheduled Meds:   mupirocin   Each Nostril BID    midodrine  5 mg Oral TID WC    insulin glargine  14 Units SubCUTAneous Nightly    epoetin siddharth-epbx  10,000 Units IntraVENous Once per day on Tuesday Thursday Saturday    metoprolol tartrate  25 mg Oral BID    ranolazine  500 mg Oral BID    apixaban  5 mg Oral BID    atorvastatin  80 mg Oral Daily    busPIRone  10 mg Oral TID    calcium acetate  2 capsule Oral TID    clopidogrel  75 mg Oral Daily    DULoxetine  60 mg Oral Daily    [Held by provider] isosorbide mononitrate  30 mg Oral Daily    pantoprazole  40 mg Oral Daily    pregabalin  25 mg Oral Daily    [Held by provider] sacubitril-valsartan  1 tablet Oral BID    sodium chloride flush  5-40 mL IntraVENous 2 times per day    insulin lispro  0-8 Units SubCUTAneous 4x Daily AC & HS     Continuous Infusions:   sodium chloride      dextrose       PRN Meds:.oxyCODONE, dilTIAZem, heparin (porcine), ipratropium 0.5 mg-albuterol 2.5 mg, midodrine, sodium chloride flush, sodium chloride, polyethylene glycol, acetaminophen **OR** acetaminophen, prochlorperazine, glucose, dextrose bolus **OR** dextrose bolus, glucagon (rDNA), dextrose    Objective/     Vitals:    01/15/25 0800 01/15/25 0803 01/15/25 0806 01/15/25 0840   BP: (!) 60/32 (!) 76/52 (!) 133/101 (!) 110/94

## 2025-01-15 NOTE — PROGRESS NOTES
01/15/25 1159   NIV Type   NIV Started/Stopped On   Equipment Type v60   Mode Bilevel   Mask Type Full face mask   Mask Size Large   Assessment   Pulse (!) 114   Respirations 22   SpO2 100 %   Comfort Level Good   Using Accessory Muscles No   Mask Compliance Good   Skin Assessment Clean, dry, & intact   Skin Protection for O2 Device Yes   Orientation Middle;Bilateral   Location Nose;Cheek   Intervention(s) Skin Barrier   Settings/Measurements   PIP Observed 14 cm H20   IPAP 14 cmH20   CPAP/EPAP 5 cmH2O   Vt (Measured) 646 mL   Rate Ordered 10   Insp Rise Time (%) 1 %   FiO2  40 %   I Time/ I Time % 1 s   Minute Volume (L/min) 14.2 Liters   Mask Leak (lpm) 44 lpm   Patient's Home Machine No   Alarm Settings   Alarms On Y   Low Pressure (cmH2O) 6 cmH2O   High Pressure (cmH2O) 30 cmH2O   Delay Alarm 20 sec(s)   RR Low (bpm) 6   RR High (bpm) 40 br/min   Oxygen Therapy/Pulse Ox   O2 Therapy Oxygen   O2 Device PAP (positive airway pressure)

## 2025-01-15 NOTE — CONSULTS
Pharmacy Note  Vancomycin Consult     Igor Ann is a 54 y.o. male started on Vancomycin for CAP; consult received from Angelique Evans NP to manage therapy. Also receiving the following antibiotics: Cefepime.     Allergies:  Morphine      Tmax: 99.1     Micro: n/a          Recent Labs     25  0417 01/15/25  0804   CREATININE 7.4* 5.2*              Recent Labs     25  0855 01/15/25  0804   WBC 11.0 15.6*         Estimated Creatinine Clearance: 18 mL/min (A) (based on SCr of 5.2 mg/dL (HH)).        Intake/Output Summary (Last 24 hours) at 1/15/2025 1442  Last data filed at 1/15/2025 1400      Gross per 24 hour   Intake 0 ml   Output 199 ml   Net -199 ml             Wt Readings from Last 1 Encounters:   01/15/25 98.3 kg (216 lb 11.4 oz)          Body mass index is 31.99 kg/m².     Loading dose (critically ill or in ICU, require dialysis or renal replacement therapy): Vancomycin 25 mg/kg IVPB x 1 (maximum  CRRT: 7.5-10 mg/kg q12h   Goal Vancomycin trough: 15-20 mcg/mL   Goal Vancomycin AUC: 400-600      Assessment/Plan:  Will initiate Vancomycin with a one time loading dose of 2000 mg x1, followed by 1000 mg IV every 12 hours.  Vancomycin level ordered for 200. Timing of trough level will be determined based on culture results, renal function, and clinical response.      Thank you for the consult.  Benito Mederos, VishalD, BCPS 1/15/2025 2:43 PM    Vancomycin Day 2  Current Dosin mg q12h  No results for input(s): \"VANCOTROUGH\" in the last 72 hours.  Recent Labs     25  0245   VANCORANDOM 12.5     Recent Labs     01/15/25  0804 01/15/25  1830 25  0245   CREATININE 5.2* 2.8* 1.9*     Estimated Creatinine Clearance: 50 mL/min (A) (based on SCr of 1.9 mg/dL (H)).  Recent Labs     25  0855 01/15/25  0804   WBC 11.0 15.6*     Regimen: 1250 mg IV every 24 hours.  Start time: 06:00 on 2025  Exposure target: AUC24 (range)400-600 mg/L.hr   [id number]: 471 mg/L.hr  AUC24,ss:

## 2025-01-15 NOTE — PROGRESS NOTES
Responded to overhead page for rapid response. No family present, staff working with Pt.  remains available should Pt and/or family require emotional/spiritual support.    oTlu Martinez

## 2025-01-15 NOTE — CARE COORDINATION
Dr Munson updated writer:  rapid response today, on bipap, to start CRRT.  Pt from [facility] long term care with HD, plan return without barriers.  CM will continue to follow pt's progress and coordinate discharge arrangements as appropriate.  REINALDO Pittman-RN

## 2025-01-15 NOTE — PROGRESS NOTES
Patient BP 85/58 and 85/61 he is lethargic and placed on Bipap after returning from HD, MD notified. Continue to monitor.

## 2025-01-15 NOTE — PROGRESS NOTES
Shift: [id number]    Admitting diagnosis: Afib with RVR     Surgery: no     Emergency Contact/POA: Crystal Ann-Wife   Family updated: yes    Most recent vitals: BP (!) 99/57   Pulse (!) 123   Temp 98.5 °F (36.9 °C) (Axillary)   Resp 22   Ht 1.753 m (5' 9.02\")   Wt 98.3 kg (216 lb 11.4 oz)   SpO2 98%   BMI 31.99 kg/m²      Rhythm: Atrial Fibrillation      Respiratory support: - No ventilator support      Increased O2 requirements: no    Admission weight Weight - Scale: 101.8 kg (224 lb 8 oz)  Today's weight   Wt Readings from Last 1 Encounters:   01/15/25 98.3 kg (216 lb 11.4 oz)         UOP >30ml/hr: no- Anuric    Lines/Drains  LDA Insertion Date Discontinued Date Dressing Changes   PIV       TLC       Arterial       Wharton       Vas Cath      ETT       Surgical drains        Night Shift Hospitalist Interventions    Problem(Brief) Date Time Intervention Physician contacted                                               Drip rates at handoff:    prismaSATE BGK 4/2.5 2,000 mL/hr at 01/15/25 1323    prismaSATE BGK 4/2.5 1,500 mL/hr at 01/15/25 1403    prismaSATE BGK 4/2.5 750 mL/hr at 01/15/25 1040    norepinephrine 2 mcg/min (01/15/25 1504)    amiodarone      Followed by    amiodarone      sodium chloride 10 mL/hr at 01/15/25 1504    dextrose         Hospital Course Daily Updates:  Admit Day# 0 1/15/25 Days  -Patient came down at shift change after rapid for unresponsiveness. Started on bipap before coming to ICU.  -Once in ICU slight improvements shown on gas after 1 hour.   -CRRT began at 1040  -Patient briefly responded to sternal rub and said \"yes\" \"no\" and \"stop\" but stopped. Repeat ABG drawn with no changes. No intubation at this time. Remain on bipap.  -Goal of CRRT is to remove 50/hr. Was able to do that until around 1600 when patient's pressures dropped. Stopped removing fluid and titrated Levo.  -Added Vaso and was able to start weaning down Levo and removing fluid again  -Dobutamine and Amio

## 2025-01-15 NOTE — PLAN OF CARE
CHF Care Plan      Patient's EF (Ejection Fraction) is less than 40%    Heart Failure Medications:  Diuretics:: None    (One of the following REQUIRED for EF </= 40%/SYSTOLIC FAILURE but MAY be used in EF% >40%/DIASTOLIC FAILURE)        ACE:: None        ARB:: None         ARNI:: None    (Beta Blockers)  NON- Evidenced Based Beta Blocker (for EF% >40%/DIASTOLIC FAILURE): Metoprolol TARTrate- Lopressor    Evidenced Based Beta Blocker::(REQUIRED for EF% <40%/SYSTOLIC FAILURE) None  ...................................................................................................................................................    Failed to redirect to the Timeline version of the [employer] SmartLink.      Patient's weights and intake/output reviewed    Daily Weight log at bedside, patient/family participation in use of log: \"yes    Patient's current weight today shows a difference of 8 lbs less than last documented weight. I used last weight from 1/14/25 because weight from 1/15/25 at 0451 (on previous unit) shows 15lb loss in 2 hr and 45 minutes when they arrived to ICU.       Intake/Output Summary (Last 24 hours) at 1/15/2025 1908  Last data filed at 1/15/2025 1900  Gross per 24 hour   Intake 640.16 ml   Output 464 ml   Net 176.16 ml       Education Booklet Provided: yes    Comorbidities Reviewed Yes    Patient has a past medical history of Ambulatory dysfunction, Aortic stenosis, Arthritis, Asthma, Bilateral hilar adenopathy syndrome, CAD (coronary artery disease), Cardiomyopathy (HCC), CHF, EF 35-40% (July 2024), Chronic pain, COPD (chronic obstructive pulmonary disease) ([facility]), CVA (cerebral vascular accident) ([facility]), Depression, Diabetes mellitus (HCC), Difficult intravenous access, Emphysema of lung (HCC), ESRD (end stage renal disease) on dialysis (HCC), Fear of needles, Gastric ulcer, GERD (gastroesophageal reflux disease), HD, T/R/Sa, Heart valve problem, History of spinal fracture, History of transcatheter aortic

## 2025-01-15 NOTE — PROGRESS NOTES
Pt BP 82/60, confused, anxious, agitated, removing his oxygen, unable to swallow pills. Paged NP with orders made and carried out.

## 2025-01-15 NOTE — PROGRESS NOTES
Comprehensive Nutrition Assessment    Type and Reason for Visit:  Initial, LOS    Nutrition Recommendations/Plan:   Continue CC4 (60 g/meal) and low potassium (< 3,000 mg/day) diet.   Add low phosphorus (< 1,000 mg/day) diet restriction.   Modify low sodium (2 g/day) to NIDA (3-4 g/day) to promote PO intakes during stay.   Add Nepro once/day.   Encourage PO intake as tolerated.   No BM documented in 5 days. Consider bowel regimen if accurate.   Monitor nutrition adequacy, pertinent labs, bowel habits, wt changes, and clinical progress      Malnutrition Assessment:  Malnutrition Status:  At risk for malnutrition (01/15/25 1220)    Context:  Acute Illness     Findings of the 6 clinical characteristics of malnutrition:  Energy Intake:  Mild decrease in energy intake  Weight Loss:  Mild weight loss (4 lb wt loss since admission - likely r/t fluid shifts)     Body Fat Loss:  Unable to assess     Muscle Mass Loss:  Unable to assess    Fluid Accumulation:  Mild Extremities   Strength:  Not Performed    Nutrition Assessment:    Initial + LOS. Admitted w/ chest pain and dyspnea. 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 Transferred to the ICU today after rapid response for further monitoring. Per Cardiology note today, pt \"nonresponsive to verbal and tactile stimuli.\" On BiPAP. Plans to initiate CRRT per Nephrology note today. Three PO intakes documented (0% x2 and 1-25% x1). Some wt fluctuations noted this admission in EMR (likely r/t fluid shifts). Pt agreeable to Nepro during past admissions - will order once/day to better meet estimated nutrition needs during stay. Will continue to monitor.    Nutrition Related Findings:    Last BM documented 1/10 (no bowel regimen in MAR). BG x 24 hours: 108-204. HbA1c: 7.1% (1/09). Potassium: 5.5 and phosphorus: 6.2. Non-pitting generalized and trace BLE edema. Wound Type: Diabetic Ulcer (foot)       Current Nutrition Intake &

## 2025-01-15 NOTE — PROCEDURES
PROCEDURE NOTE  Date: 1/15/2025   Name: Igor Ann  YOB: 1970    Insert Arterial Line    Date/Time: 1/15/2025 6:48 PM    Performed by: Angelique Evans APRN - CNP  Authorized by: Angelique Evans APRN - CNP  Consent: The procedure was performed in an emergent situation.  Patient identity confirmed: arm band  Time out: Immediately prior to procedure a \"time out\" was called to verify the correct patient, procedure, equipment, support staff and site/side marked as required.  Indications: hemodynamic monitoring  Location: right femoral    Sedation:  Patient sedated: no    Needle gauge: 20  Seldinger technique: Seldinger technique used  Number of attempts: 1  Post-procedure: line sutured  Patient tolerance: patient tolerated the procedure well with no immediate complications  Comments: EBL less than 5 cc

## 2025-01-15 NOTE — PROGRESS NOTES
01/15/25 0840   NIV Type   NIV Started/Stopped On   Equipment Type v60   Mode Bilevel   Mask Type Full face mask   Mask Size Large   Assessment   Pulse (!) 102   Heart Rate Source Monitor   Respirations 23   BP (!) 110/94   SpO2 99 %   Level of Consciousness 3   Comfort Level Good   Using Accessory Muscles No   Mask Compliance Good   Skin Assessment Clean, dry, & intact   Skin Protection for O2 Device Yes   Intervention(s) Skin Barrier;Reposition Device   Settings/Measurements   PIP Observed 15 cm H20   IPAP 14 cmH20   CPAP/EPAP 5 cmH2O   Vt (Measured) 663 mL   Rate Ordered 10   Insp Rise Time (%) 1 %   FiO2  40 %   I Time/ I Time % 1 s   Minute Volume (L/min) 14.8 Liters   Mask Leak (lpm) 38 lpm   Patient's Home Machine No   Alarm Settings   Alarms On Y   Oxygen Therapy/Pulse Ox   O2 Device PAP (positive airway pressure)   $Pulse Oximeter $Spot check (multiple/continuous)

## 2025-01-15 NOTE — PROGRESS NOTES
Podiatry Progress Note    Subjective:   Patient seen at bedside this evening.  Patient has been transferred to the ICU since last encounter.      Objective:   Vitals:  BP (!) 90/53   Pulse (!) 109   Temp 98.5 °F (36.9 °C) (Axillary)   Resp 19   Ht 1.753 m (5' 9.02\")   Wt 98.3 kg (216 lb 11.4 oz)   SpO2 95%   BMI 31.99 kg/m²   Integument:  Full thickness ulceration at the lateral left fifth metatarsal head.  It measures approximately 0.5cm x 0.8cm x 0.2cm.  The base was granular.  No active drainage.  No surrounding erythema.  No purulence. The wound did not probe or undermine.  No acute signs of infection.  Otherwise, skin was warm, dry and supple, bilateral.   Neurologic:  Protective sensation is grossly diminished to light touch at the level of the distal foot, bilateral.  Vascular:  DP and PT pulses are palpable, bilateral.  CFT is brisk to the distal foot, bilateral.  No significant edema appreciated.  Musculoskeletal:  TMA on the right.  No gross musculoskeletal deformities on the left.     Labs:   CBC with Differential:    Lab Results   Component Value Date/Time    WBC 15.6 01/15/2025 08:04 AM    RBC 3.24 01/15/2025 08:04 AM    HGB 9.7 01/15/2025 02:27 PM    HCT 28.7 01/15/2025 08:04 AM     01/15/2025 08:04 AM    MCV 88.4 01/15/2025 08:04 AM    MCH 26.7 01/15/2025 08:04 AM    MCHC 30.2 01/15/2025 08:04 AM    RDW 20.3 01/15/2025 08:04 AM    NRBC 1 01/15/2025 08:04 AM    BANDSPCT 1 01/15/2025 08:04 AM    METASPCT 1 04/07/2024 05:12 AM    LYMPHOPCT 3.0 01/15/2025 08:04 AM    MONOPCT 5.0 01/15/2025 08:04 AM    MYELOPCT 1 04/07/2024 05:12 AM    EOSPCT 0.0 01/15/2025 08:04 AM    BASOPCT 0.0 01/15/2025 08:04 AM    MONOSABS 0.8 01/15/2025 08:04 AM    LYMPHSABS 0.5 01/15/2025 08:04 AM    EOSABS 0.0 01/15/2025 08:04 AM    BASOSABS 0.0 01/15/2025 08:04 AM    DIFFTYPE Auto 05/17/2013 06:50 AM     CMP:    Lab Results   Component Value Date/Time     01/15/2025 08:04 AM    K 5.5 01/15/2025 08:04 AM    K

## 2025-01-15 NOTE — PROGRESS NOTES
01/14/25 2014   NIV Type   NIV Started/Stopped On   Equipment Type v60   Mode Bilevel   Mask Type Full face mask   Mask Size Large   Assessment   Respirations 19   Settings/Measurements   IPAP 14 cmH20   CPAP/EPAP 5 cmH2O   Vt (Measured) 747 mL   Rate Ordered 10   FiO2  35 %   Mask Leak (lpm) 26 lpm   Patient's Home Machine No   Alarm Settings   Alarms On Y   Low Pressure (cmH2O) 6 cmH2O   High Pressure (cmH2O) 30 cmH2O

## 2025-01-15 NOTE — CONSULTS
[facility]   PULMONARY AND CRITICAL CARE MEDICINE    CRITICAL CARE CONSULT NOTE        CHIEF COMPLAINT / HPI:    The patient is a 54 y.o. male with significant past medical history of COPD, ESRD non-compliant with HD  presented with complaints of chest pain and dyspnea.  Imaging revealed bilateral pulmonary edema, taken to dialysis and was noted to be hypotensive after dialysis required transfer to ICU.  On initial presentation patient is encephalopathic on NIV, ABG showing metabolic processes primary.  Nephrology at bedside mentioned will require CRRT.    Past Medical History:      Diagnosis Date    Ambulatory dysfunction     walker for long distances, SOB with distance    Aortic stenosis     echo 2017    Arthritis     hands and hips    Asthma     Bilateral hilar adenopathy syndrome 06/03/2013    CAD (coronary artery disease)     Dr. Javier Chanel [facility]    Cardiomyopathy ([facility]) 04/19/2019    EF= 43%    CHF, EF 35-40% (July 2024)     Chronic pain     COPD (chronic obstructive pulmonary disease) ([facility])     pulmonology Dr. Batista    CVA (cerebral vascular accident) ([facility]) 05/21/2017    Depression     Diabetes mellitus ([facility])     borderline    Difficult intravenous access     Emphysema of lung ([facility])     ESRD (end stage renal disease) on dialysis ([facility])     MWF    Fear of needles     Gastric ulcer     GERD (gastroesophageal reflux disease)     HD, T/R/Sa     Heart valve problem     bicuspic valve    History of spinal fracture     work incident    History of transcatheter aortic valve replacement (TAVR)     reported by wife    Hx of blood clots     Bilateral lower extremities; stents in place    Hyperlipidemia     Hypertension     MI (myocardial infarction) ([facility]) 2019    has had 9 MIs. 2019 was the last    Neuromuscular disorder ([facility])     due to CVA    Numbness and tingling in left arm     from fistula    Pneumonia     PONV (postoperative nausea and vomiting)     Prolonged emergence from general  [id number] 99 01/15/2025 02:27 PM    PHART 7.267 01/15/2025 02:27 PM    THGBART 16.4 05/16/2013 05:31 AM    XGK1KAJ 35.8 01/15/2025 02:27 PM    PO2ART 136.5 01/15/2025 02:27 PM    PJQ2CPY 17 01/15/2025 02:27 PM       Cultures:   Blood Culture:    Sputum Culture:        Radiology Review:  All pertinent images / reports were reviewed as a part of this visit. imaging reveals the following:   XR CHEST 1 VIEW   Final Result   1.  Interval worsening of airspace disease with a left-sided predominance,   possibly edema or pneumonia.   2.  Small bilateral pleural effusions.      Electronically signed by Gerardo Hadley      XR CHEST PORTABLE   Final Result      Cardiomegaly with vascular congestion appearing similar to previous study.   Some improvement in right basilar airspace density is noted.      Electronically signed by MD Sandip Kimbrough      XR FOOT LEFT (2 VIEWS)   Final Result      1.  No radiographic evidence of acute osteomyelitis.      Electronically signed by Twin Reyes           ASSESSMENT   54 y.o. male admitted to ICU for management of:  Toxic metabolic encephalopathy  Volume overload   Metabolic acidosis  ESRD on HD  Hypotension      PLAN   Provide O2 supplementation to keep SpO2 between 88-92% if needed.   NIPPV QHS and PRN for increased WOB   Continue to closely monitor mentation, may need ETT.   CRRT under nephrology guidance  Cardiology following  Palliative medicine consult   ICU Glycemic goal 140-180 mg/dL  Diet: NPO  IVF: N/A  GI prophylaxis: elevate HOB 30 degrees, PPI  DVT prophylaxis: on therapeutic Eliquis  Bowel regimen: N/A  Abx: N/A    Pt has a high probability of imminent or life-threatening deterioration requiring close monitoring, and highly complex decision-making and/or interventions of high intensity to assess, manipulate, and support his critical organ systems to prevent a likely inevitable decline which could occur if left untreated.     A total critical care time 31 minutes was used. This

## 2025-01-15 NOTE — PROGRESS NOTES
[facility]   Daily Cardiovascular Progress Note    Admit Date: 1/7/2025    Chief complaint: sob  HPI:     Patient is nonresponsive to verbal and tactile stimuli, he is on noninvasive ventilatory support with BiPAP, history is not obtainable from him.  History is per nursing staff at the bedside as well as chart review, patient had respiratory distress requiring transfer to the ICU overnight.  He is currently also undergoing additional dialysis, he has had intermittent hypotension.      Medications/Labs all Reviewed:  Patient Active Problem List   Diagnosis    Sepsis without acute organ dysfunction ([facility])    CHF (congestive heart failure) ([facility])    Chronic obstructive pulmonary disease ([facility])    Coronary artery disease involving native coronary artery of native heart    PVD (peripheral vascular disease) ([facility])    Bicuspid aortic valve    Bilateral hilar adenopathy syndrome    Claudication in peripheral vascular disease ([facility])    Hypertension    Diabetic neuropathy ([facility])    Type 2 DM with hypertension and ESRD on dialysis ([facility])    Passive smoke exposure    Depression with anxiety    Community acquired pneumonia of left lower lobe of lung    Obesity    ZAINAB on CPAP    Degeneration of lumbar or lumbosacral intervertebral disc    Lumbar radiculopathy    Lumbosacral spondylosis without myelopathy    Biliary colic    Symptomatic cholelithiasis    Gastroparesis due to DM ([facility])    Elevated troponin    Angina, class IV ([facility])    Dyspnea    Dyslipidemia    Chronic systolic (congestive) heart failure ([facility])    Ischemic cardiomyopathy    Tobacco abuse    CVA (cerebral vascular accident) ([facility])    History of CVA (cerebrovascular accident)    Type 2 diabetes mellitus without complication, without long-term current use of insulin ([facility])    ZAINAB treated with BiPAP    Pleural effusion    Chronic anemia    Nonrheumatic aortic valve stenosis    Mucus plugging of bronchi    Hemodialysis-associated hypotension    ESRD (end stage  [phone].      Lamberto Pate MD, [facility]   Interventional Cardiologist  [facility]  [phone] [place] Office  [phone] [place] Office  1/15/2025 10:21 AM

## 2025-01-15 NOTE — PROGRESS NOTES
Patient no longer responding to sternal rub. Responses was minimal before with only a few one word answers. Repeat ABG stable as to previous ABG after on Bipap for 1 hour. Decision was made to continue Bipap at this time since ABG was not getting worse and patient was oxygenating well.

## 2025-01-15 NOTE — PROCEDURES
PROCEDURE NOTE  Date: 1/15/2025   Name: Igor Ann  YOB: 1970    CENTRAL LINE    Date/Time: 1/15/2025 6:42 PM    Performed by: Angelique Evnas APRN - CNP  Authorized by: Angelique Evans APRN - CNP  Consent: The procedure was performed in an emergent situation.  Risks and benefits: risks, benefits and alternatives were discussed  Required items: required blood products, implants, devices, and special equipment available  Patient identity confirmed: arm band  Time out: Immediately prior to procedure a \"time out\" was called to verify the correct patient, procedure, equipment, support staff and site/side marked as required.  Indications: vascular access  Preparation: skin prepped with ChloraPrep  Skin prep agent dried: skin prep agent completely dried prior to procedure  Sterile barriers: all five maximum sterile barriers used - cap, mask, sterile gown, sterile gloves, and large sterile sheet  Location details: right femoral  Site selection rationale: attempt at right IJ but unable to advance the wire.  Patient position: flat  Catheter type: triple lumen  Catheter size: 7.5 Fr  Pre-procedure: landmarks identified  Ultrasound guidance: yes  Sterile ultrasound techniques: sterile gel and sterile probe covers were used  Number of attempts: 1  Successful placement: yes  Post-procedure: line sutured and dressing applied  Assessment: free fluid flow  Patient tolerance: patient tolerated the procedure well with no immediate complications  Comments: EBL less than 10 cc

## 2025-01-15 NOTE — PROGRESS NOTES
Pt HR dropped between 30-50's, Checked the patient, was unresponsive. Placed him on Bipap. Rapid called. /64, . Abg done. Transferred to ICU for continuity of care. Report given to assigned RN.

## 2025-01-15 NOTE — PROGRESS NOTES
[facility] Medicine Progress Note  V 1.6      Date of Admission: 1/7/2025    Hospital Day: 9      Chief Admission Complaint: Mental status change    Subjective: Patient was less responsive earlier today.  Rapid response was called.  Patient appeared to be hyperventilating.  Patient was transferred to ICU.    Presenting Admission History:       The patient is a pleasant 54 Y M with a h/o COPD on 4LNC, HTN, DM2 with partial R foot amputation, multivessel CAD deemed not a candidate for CABG s/p stents (most recently in 10/2024), ischemic cardiomyopathy with EF 35-40%, CHF, Afib, CVA, bicuspid AS s/p 2019 TAVR, PAD s/p R iliac stent, and ESRD on HD.  It looks like he was admitted 7x last year, often for chest pain and/or CHF (sometimes misses HD).  He lives at [facility].  The patient presented to [facility] on 1/6 with chest pain and dyspnea.  They saw pulmonary edema and bilateral pleural effusions on his CXR and nephrology was consulted for HD.  Cardiology was consulted because of the elevated troponin and recommended transfer to [facility] for interventional cardiology eval.  Upon arrival here the patient was breathing easily but did report ongoing dyspnea above baseline, worse orthopnea, worse edema in his abdomen.  No fevers or chills.  No further chest pain.        Assessment/Plan:      Current Principal Problem:  Atrial fibrillation with rapid ventricular response (HCC)    Acute on chronic systolic heart failure.  Last EF 25 to 30% on 1/9/2025.  Patient on hemodialysis.  Patient will need continued hemodialysis for ultrafiltration.  Discussed with nephrologist and nephrology NP.  End-stage renal disease.  Patient will continue hemodialysis Tuesday Thursday Saturday.Nephrology consulted and appreciated.  Available consultant notes from yesterday and today were reviewed on 1/15/2025, w/ plan for continued inpatient w/up and management -hemodialysis  Atrial fibrillation with RVR.  Patient's heart rate labile.  She did  []Other -    Anticipated Discharge Day/Date: Patient acutely ill.  Greater than 2 days anticipated stay.  Barriers to Discharge: Acutely ill  --------------------------------------------------    MDM (any 2 required for High level billing)    A. Problems (any 1)  [x] Acute/Chronic Illness/injury posing ongoing threat to life and/or bodily function without ongoing treatment    [x] Severe exacerbation of chronic illness    --------------------------------------------------  B. Risk of Treatment (any 1)    [x] Drugs/treatments that require intensive monitoring for toxicity    [] IV ABX (Vancomycin, Aminoglycosides, etc)     [] Post-Cath/Contrast study requiring serial monitoring    [] IV Narcotic analgesia    [] Aggressive IV diuresis    [] Hypertonic Saline    [] Critical electrolyte abnormalities requiring IV replacement    [] Insulin - Scheduled/SSI or Insulin gtt    [x] Anticoagulation (Heparin gtt or Coumadin - other anticoagulants in special circumstances)    [] Cardiac Medications (IV Amiodarone/Diltiazem, Tikosyn, etc)    [x] Hemodialysis    [] Other -    [] Change in code status    [] Decision to escalate care    [] Major surgery/procedure with associated risk factors    --------------------------------------------------  C. Data (any 2)    [] Data Review (any 3)    [] Consultant notes from yesterday/today    [x] All available current labs reviewed interpreted for clinical significance    [x] Appropriate follow-up labs were ordered  [] Collateral history obtained     [] Independent Interpretation of tests (any 1)    [] Telemetry (Rhythm Strip) personally reviewed and interpreted        [] Imaging personally reviewed and interpreted     [x] Discussion (any 1)  [x] Multi-Disciplinary Rounds with Case Management  [] Discussed management of the case with           Labs:  Personally reviewed on 1/15/2025 and interpreted for clinical significance as documented above.     Recent Labs     01/14/25  0855 01/15/25  0659

## 2025-01-15 NOTE — PROGRESS NOTES
01/15/25 1614   NIV Type   NIV Started/Stopped On   Equipment Type v60   Mode Bilevel   Mask Type Full face mask   Mask Size Large   Assessment   Pulse (!) 114   Respirations 20   SpO2 95 %   Comfort Level Good   Using Accessory Muscles No   Mask Compliance Good   Skin Assessment Clean, dry, & intact   Skin Protection for O2 Device Yes   Orientation Middle;Bilateral   Location Nose;Cheek   Intervention(s) Skin Barrier   Settings/Measurements   PIP Observed 14 cm H20   IPAP 14 cmH20   CPAP/EPAP 5 cmH2O   Vt (Measured) 750 mL   Rate Ordered 10   Insp Rise Time (%) 1 %   FiO2  40 %   I Time/ I Time % 1 s   Minute Volume (L/min) 14.9 Liters   Mask Leak (lpm) 40 lpm   Patient's Home Machine No   Alarm Settings   Alarms On Y   Low Pressure (cmH2O) 6 cmH2O   High Pressure (cmH2O) 30 cmH2O   Delay Alarm 20 sec(s)   RR Low (bpm) 6   RR High (bpm) 40 br/min   Oxygen Therapy/Pulse Ox   O2 Therapy Oxygen   O2 Device PAP (positive airway pressure)

## 2025-01-15 NOTE — PLAN OF CARE
Problem: Safety - Adult  Goal: Free from fall injury  1/15/2025 0053 by Jackie Merchant, RN  Outcome: Progressing  Note: Pt will remain free from falls throughout hospital stay. Fall precautions in place, bed alarm on, bed in lowest position with wheels locked and side rails 2/4 up.Will continue to monitor throughout shift.      Problem: Chronic Conditions and Co-morbidities  Goal: Patient's chronic conditions and co-morbidity symptoms are monitored and maintained or improved  1/15/2025 0053 by Jackie Merchant, RN  Outcome: Progressing  Note:   CHF Care Plan      Patient's EF (Ejection Fraction) is less than 40%    Heart Failure Medications:  Diuretics:: None    (One of the following REQUIRED for EF </= 40%/SYSTOLIC FAILURE but MAY be used in EF% >40%/DIASTOLIC FAILURE)        ACE:: None        ARB:: None         ARNI:: None    (Beta Blockers)  NON- Evidenced Based Beta Blocker (for EF% >40%/DIASTOLIC FAILURE): Metoprolol TARTrate- Lopressor    Evidenced Based Beta Blocker::(REQUIRED for EF% <40%/SYSTOLIC FAILURE) None  ...................................................................................................................................................    Failed to redirect to the Timeline version of the [employer] SmartLink.      Patient's weights and intake/output reviewed    Daily Weight log at bedside, patient/family participation in use of log: pt unable to participate\" (confused)    Patient's current weight today shows a difference of 3 lbs less than last documented weight.    No intake or output data in the 24 hours ending 01/15/25 0054    Education Booklet Provided: yes    Comorbidities Reviewed Yes    Patient has a past medical history of Ambulatory dysfunction, Aortic stenosis, Arthritis, Asthma, Bilateral hilar adenopathy syndrome, CAD (coronary artery disease), Cardiomyopathy (HCC), CHF, EF 35-40% (July 2024), Chronic pain, COPD (chronic obstructive pulmonary disease) ([facility]), CVA (cerebral vascular

## 2025-01-15 NOTE — PROGRESS NOTES
Latest Reference Range & Units 01/15/25 06:59   pH, Arterial 7.350 - 7.450  7.217 (L)   pCO2, Arterial 35.0 - 45.0 mm Hg 40.4   pO2, Arterial 75.0 - 108.0 mm Hg 55.0 (L)   HCO3, Arterial 21.0 - 29.0 mmol/L 16.4 (L)   TCO2 (calc), Art Not Established mmol/L 18   Base Excess, Arterial -3 - 3  -11 (L)   O2 Sat, Arterial 93 - 100 % 82 (L)   (L): Data is abnormally low    Arrived at Rapid. Nursing had already placed patient on the BIPAP. Patient unresponsive to stimuli but is breathing. ABG obtained left brachial with doppler dur to poor pulses.    Patient then transported on the BIPAP to ICU. Handoff report to ICU RT.

## 2025-01-16 ENCOUNTER — APPOINTMENT (OUTPATIENT)
Dept: GENERAL RADIOLOGY | Age: 55
End: 2025-01-16
Attending: INTERNAL MEDICINE
Payer: MEDICARE

## 2025-01-16 VITALS
TEMPERATURE: 98 F | HEART RATE: 111 BPM | OXYGEN SATURATION: 87 % | BODY MASS INDEX: 31.8 KG/M2 | WEIGHT: 214.73 LBS | DIASTOLIC BLOOD PRESSURE: 84 MMHG | HEIGHT: 69 IN | RESPIRATION RATE: 25 BRPM | SYSTOLIC BLOOD PRESSURE: 123 MMHG

## 2025-01-16 LAB
ALBUMIN SERPL-MCNC: 3.4 G/DL (ref 3.4–5)
ALBUMIN SERPL-MCNC: 3.4 G/DL (ref 3.4–5)
ANION GAP SERPL CALCULATED.3IONS-SCNC: 22 MMOL/L (ref 3–16)
ANION GAP SERPL CALCULATED.3IONS-SCNC: 23 MMOL/L (ref 3–16)
BASE EXCESS BLDA CALC-SCNC: -10 MMOL/L (ref -3–3)
BASOPHILS # BLD: 0 K/UL (ref 0–0.2)
BASOPHILS NFR BLD: 0 %
BUN SERPL-MCNC: 18 MG/DL (ref 7–20)
BUN SERPL-MCNC: 22 MG/DL (ref 7–20)
CA-I BLD-SCNC: 1.14 MMOL/L (ref 1.12–1.32)
CA-I BLD-SCNC: 1.22 MMOL/L (ref 1.12–1.32)
CA-I BLD-SCNC: 1.3 MMOL/L (ref 1.12–1.32)
CALCIUM SERPL-MCNC: 9.2 MG/DL (ref 8.3–10.6)
CALCIUM SERPL-MCNC: 9.3 MG/DL (ref 8.3–10.6)
CHLORIDE SERPL-SCNC: 97 MMOL/L (ref 99–110)
CHLORIDE SERPL-SCNC: 99 MMOL/L (ref 99–110)
CO2 BLDA-SCNC: 20 MMOL/L
CO2 SERPL-SCNC: 15 MMOL/L (ref 21–32)
CO2 SERPL-SCNC: 17 MMOL/L (ref 21–32)
CREAT SERPL-MCNC: 1.5 MG/DL (ref 0.9–1.3)
CREAT SERPL-MCNC: 1.9 MG/DL (ref 0.9–1.3)
DEPRECATED RDW RBC AUTO: 19.9 % (ref 12.4–15.4)
EOSINOPHIL # BLD: 0 K/UL (ref 0–0.6)
EOSINOPHIL NFR BLD: 0 %
GFR SERPLBLD CREATININE-BSD FMLA CKD-EPI: 41 ML/MIN/{1.73_M2}
GFR SERPLBLD CREATININE-BSD FMLA CKD-EPI: 54 ML/MIN/{1.73_M2}
GLUCOSE BLD-MCNC: 171 MG/DL (ref 70–99)
GLUCOSE BLD-MCNC: 250 MG/DL (ref 70–99)
GLUCOSE SERPL-MCNC: 211 MG/DL (ref 70–99)
GLUCOSE SERPL-MCNC: 221 MG/DL (ref 70–99)
HCO3 BLDA-SCNC: 18.3 MMOL/L (ref 21–29)
HCT VFR BLD AUTO: 29.4 % (ref 40.5–52.5)
HCT VFR BLD AUTO: 33 % (ref 40.5–52.5)
HGB BLD CALC-MCNC: 11.2 GM/DL (ref 13.5–17.5)
HGB BLD-MCNC: 8.9 G/DL (ref 13.5–17.5)
LACTATE BLD-SCNC: 1.56 MMOL/L (ref 0.4–2)
LACTATE BLDV-SCNC: 1.4 MMOL/L (ref 0.4–2)
LACTATE BLDV-SCNC: 1.9 MMOL/L (ref 0.4–2)
LYMPHOCYTES # BLD: 0.6 K/UL (ref 1–5.1)
LYMPHOCYTES NFR BLD: 3 %
MAGNESIUM SERPL-MCNC: 2.43 MG/DL (ref 1.8–2.4)
MAGNESIUM SERPL-MCNC: 2.55 MG/DL (ref 1.8–2.4)
MCH RBC QN AUTO: 26.6 PG (ref 26–34)
MCHC RBC AUTO-ENTMCNC: 30.4 G/DL (ref 31–36)
MCV RBC AUTO: 87.6 FL (ref 80–100)
MONOCYTES # BLD: 1.5 K/UL (ref 0–1.3)
MONOCYTES NFR BLD: 8 %
NEUTROPHILS # BLD: 16.9 K/UL (ref 1.7–7.7)
NEUTROPHILS NFR BLD: 81 %
NEUTS BAND NFR BLD MANUAL: 8 % (ref 0–7)
NEUTS VAC BLD QL SMEAR: PRESENT
PCO2 BLDA: 46.8 MM HG (ref 35–45)
PERFORMED ON: ABNORMAL
PERFORMED ON: ABNORMAL
PH BLDA: 7.2 [PH] (ref 7.35–7.45)
PH BLDV: 7.21 [PH] (ref 7.35–7.45)
PH BLDV: 7.33 [PH] (ref 7.35–7.45)
PHOSPHATE SERPL-MCNC: 3.2 MG/DL (ref 2.5–4.9)
PHOSPHATE SERPL-MCNC: 3.5 MG/DL (ref 2.5–4.9)
PLATELET # BLD AUTO: 104 K/UL (ref 135–450)
PLATELET BLD QL SMEAR: ABNORMAL
PMV BLD AUTO: 10 FL (ref 5–10.5)
PO2 BLDA: 74.8 MM HG (ref 75–108)
POC SAMPLE TYPE: ABNORMAL
POTASSIUM BLD-SCNC: 5.1 MMOL/L (ref 3.5–5.1)
POTASSIUM SERPL-SCNC: 5.2 MMOL/L (ref 3.5–5.1)
POTASSIUM SERPL-SCNC: 5.2 MMOL/L (ref 3.5–5.1)
RBC # BLD AUTO: 3.36 M/UL (ref 4.2–5.9)
SAO2 % BLDA: 91 % (ref 93–100)
SLIDE REVIEW: ABNORMAL
SODIUM BLD-SCNC: 136 MMOL/L (ref 136–145)
SODIUM SERPL-SCNC: 135 MMOL/L (ref 136–145)
SODIUM SERPL-SCNC: 138 MMOL/L (ref 136–145)
TROPONIN, HIGH SENSITIVITY: 77 NG/L (ref 0–22)
TROPONIN, HIGH SENSITIVITY: 78 NG/L (ref 0–22)
VANCOMYCIN SERPL-MCNC: 12.5 UG/ML
WBC # BLD AUTO: 19 K/UL (ref 4–11)

## 2025-01-16 PROCEDURE — 90945 DIALYSIS ONE EVALUATION: CPT

## 2025-01-16 PROCEDURE — 84295 ASSAY OF SERUM SODIUM: CPT

## 2025-01-16 PROCEDURE — 6360000002 HC RX W HCPCS: Performed by: INTERNAL MEDICINE

## 2025-01-16 PROCEDURE — 2580000003 HC RX 258: Performed by: NURSE PRACTITIONER

## 2025-01-16 PROCEDURE — 2700000000 HC OXYGEN THERAPY PER DAY

## 2025-01-16 PROCEDURE — 31720 CLEARANCE OF AIRWAYS: CPT

## 2025-01-16 PROCEDURE — 2500000003 HC RX 250 WO HCPCS: Performed by: NURSE PRACTITIONER

## 2025-01-16 PROCEDURE — 83605 ASSAY OF LACTIC ACID: CPT

## 2025-01-16 PROCEDURE — 2500000003 HC RX 250 WO HCPCS: Performed by: INTERNAL MEDICINE

## 2025-01-16 PROCEDURE — 2580000003 HC RX 258: Performed by: INTERNAL MEDICINE

## 2025-01-16 PROCEDURE — 94660 CPAP INITIATION&MGMT: CPT

## 2025-01-16 PROCEDURE — 83735 ASSAY OF MAGNESIUM: CPT

## 2025-01-16 PROCEDURE — 80069 RENAL FUNCTION PANEL: CPT

## 2025-01-16 PROCEDURE — 82330 ASSAY OF CALCIUM: CPT

## 2025-01-16 PROCEDURE — 85025 COMPLETE CBC W/AUTO DIFF WBC: CPT

## 2025-01-16 PROCEDURE — 99291 CRITICAL CARE FIRST HOUR: CPT | Performed by: INTERNAL MEDICINE

## 2025-01-16 PROCEDURE — 82803 BLOOD GASES ANY COMBINATION: CPT

## 2025-01-16 PROCEDURE — 6370000000 HC RX 637 (ALT 250 FOR IP): Performed by: INTERNAL MEDICINE

## 2025-01-16 PROCEDURE — 82947 ASSAY GLUCOSE BLOOD QUANT: CPT

## 2025-01-16 PROCEDURE — 71045 X-RAY EXAM CHEST 1 VIEW: CPT

## 2025-01-16 PROCEDURE — 85014 HEMATOCRIT: CPT

## 2025-01-16 PROCEDURE — 94761 N-INVAS EAR/PLS OXIMETRY MLT: CPT

## 2025-01-16 PROCEDURE — 80202 ASSAY OF VANCOMYCIN: CPT

## 2025-01-16 PROCEDURE — 84484 ASSAY OF TROPONIN QUANT: CPT

## 2025-01-16 PROCEDURE — 6360000002 HC RX W HCPCS: Performed by: NURSE PRACTITIONER

## 2025-01-16 PROCEDURE — APPNB45 APP NON BILLABLE 31-45 MINUTES: Performed by: NURSE PRACTITIONER

## 2025-01-16 PROCEDURE — 84132 ASSAY OF SERUM POTASSIUM: CPT

## 2025-01-16 RX ORDER — MORPHINE SULFATE 2 MG/ML
2 INJECTION, SOLUTION INTRAMUSCULAR; INTRAVENOUS
Status: DISCONTINUED | OUTPATIENT
Start: 2025-01-16 | End: 2025-01-16 | Stop reason: HOSPADM

## 2025-01-16 RX ORDER — MORPHINE SULFATE 4 MG/ML
4 INJECTION, SOLUTION INTRAMUSCULAR; INTRAVENOUS EVERY 30 MIN PRN
Status: DISCONTINUED | OUTPATIENT
Start: 2025-01-16 | End: 2025-01-16 | Stop reason: HOSPADM

## 2025-01-16 RX ORDER — HEPARIN SODIUM 1000 [USP'U]/ML
4000 INJECTION, SOLUTION INTRAVENOUS; SUBCUTANEOUS PRN
Status: DISCONTINUED | OUTPATIENT
Start: 2025-01-16 | End: 2025-01-16

## 2025-01-16 RX ORDER — HEPARIN SODIUM 10000 [USP'U]/100ML
5-30 INJECTION, SOLUTION INTRAVENOUS CONTINUOUS
Status: DISCONTINUED | OUTPATIENT
Start: 2025-01-16 | End: 2025-01-16

## 2025-01-16 RX ORDER — LORAZEPAM 2 MG/ML
1 INJECTION INTRAMUSCULAR EVERY 30 MIN PRN
Status: DISCONTINUED | OUTPATIENT
Start: 2025-01-16 | End: 2025-01-16 | Stop reason: HOSPADM

## 2025-01-16 RX ORDER — VANCOMYCIN 1.75 G/350ML
1250 INJECTION, SOLUTION INTRAVENOUS EVERY 24 HOURS
Status: DISCONTINUED | OUTPATIENT
Start: 2025-01-16 | End: 2025-01-16 | Stop reason: SDUPTHER

## 2025-01-16 RX ORDER — VANCOMYCIN HCL IN 5 % DEXTROSE 1.25 G/25
1250 PLASTIC BAG, INJECTION (ML) INTRAVENOUS EVERY 24 HOURS
Status: DISCONTINUED | OUTPATIENT
Start: 2025-01-17 | End: 2025-01-16

## 2025-01-16 RX ORDER — HEPARIN SODIUM 1000 [USP'U]/ML
2000 INJECTION, SOLUTION INTRAVENOUS; SUBCUTANEOUS PRN
Status: DISCONTINUED | OUTPATIENT
Start: 2025-01-16 | End: 2025-01-16

## 2025-01-16 RX ORDER — PANTOPRAZOLE SODIUM 40 MG/10ML
40 INJECTION, POWDER, LYOPHILIZED, FOR SOLUTION INTRAVENOUS DAILY
Status: DISCONTINUED | OUTPATIENT
Start: 2025-01-16 | End: 2025-01-16

## 2025-01-16 RX ORDER — DIGOXIN 0.25 MG/ML
250 INJECTION INTRAMUSCULAR; INTRAVENOUS EVERY 4 HOURS
Status: DISCONTINUED | OUTPATIENT
Start: 2025-01-16 | End: 2025-01-16

## 2025-01-16 RX ORDER — HEPARIN SODIUM 1000 [USP'U]/ML
4000 INJECTION, SOLUTION INTRAVENOUS; SUBCUTANEOUS ONCE
Status: DISCONTINUED | OUTPATIENT
Start: 2025-01-16 | End: 2025-01-16

## 2025-01-16 RX ADMIN — AMIODARONE HYDROCHLORIDE 1 MG/MIN: 1.8 INJECTION, SOLUTION INTRAVENOUS at 06:59

## 2025-01-16 RX ADMIN — Medication: at 09:58

## 2025-01-16 RX ADMIN — VASOPRESSIN IN 0.9% SODIUM CHLORIDE 0.03 UNITS/MIN: 20 INJECTION INTRAVENOUS at 02:20

## 2025-01-16 RX ADMIN — CEFEPIME 2000 MG: 2 INJECTION, POWDER, FOR SOLUTION INTRAVENOUS at 08:17

## 2025-01-16 RX ADMIN — MORPHINE SULFATE 2 MG: 2 INJECTION, SOLUTION INTRAMUSCULAR; INTRAVENOUS at 12:00

## 2025-01-16 RX ADMIN — Medication: at 02:11

## 2025-01-16 RX ADMIN — Medication: at 07:53

## 2025-01-16 RX ADMIN — MUPIROCIN: 20 OINTMENT TOPICAL at 08:14

## 2025-01-16 RX ADMIN — LORAZEPAM 1 MG: 2 INJECTION INTRAMUSCULAR; INTRAVENOUS at 12:20

## 2025-01-16 RX ADMIN — SODIUM CHLORIDE, PRESERVATIVE FREE 10 ML: 5 INJECTION INTRAVENOUS at 08:14

## 2025-01-16 RX ADMIN — MORPHINE SULFATE 4 MG: 4 INJECTION, SOLUTION INTRAMUSCULAR; INTRAVENOUS at 12:20

## 2025-01-16 RX ADMIN — Medication: at 10:26

## 2025-01-16 RX ADMIN — LORAZEPAM 1 MG: 2 INJECTION INTRAMUSCULAR; INTRAVENOUS at 11:59

## 2025-01-16 RX ADMIN — VANCOMYCIN 1250 MG: 1.75 INJECTION, SOLUTION INTRAVENOUS at 04:14

## 2025-01-16 RX ADMIN — Medication: at 06:54

## 2025-01-16 NOTE — CONSULTS
Palliative Care Initial Note  Palliative Care Admit date:  1/16/25    ACP/palcare referral noted

## 2025-01-16 NOTE — PROGRESS NOTES
Shift:19-07 1/15 night shift with darling     Admitting diagnosis: Afib with RVR     Surgery: no     Emergency Contact/POA: Crystal Ann-Wife   Family updated: yes    Most recent vitals: /73   Pulse (!) 121   Temp 97.9 °F (36.6 °C) (Axillary)   Resp 27   Ht 1.753 m (5' 9.02\")   Wt 97.4 kg (214 lb 11.7 oz)   SpO2 (!) 88%   BMI 31.70 kg/m²      Rhythm: Atrial Fibrillation      Respiratory support: - No ventilator support but heavy BIPAP needs and now requiring NT/OT sx       Increased O2 requirements: no    Admission weight Weight - Scale: 101.8 kg (224 lb 8 oz)  Today's weight   Wt Readings from Last 1 Encounters:   01/16/25 97.4 kg (214 lb 11.7 oz)         UOP >30ml/hr: no- Anuric    Lines/Drains  LDA Insertion Date Discontinued Date Dressing Changes   PIV  1/15 USG   1/13     TLC  1/15     Arterial  1/15     [place]  anuric     Vas Cath Permanent vas cath to Left Subclav do not remove        ETT       Surgical drains                         Night Shift Hospitalist Interventions    Problem(Brief) Date Time Intervention Physician contacted   Went back into Afib RVR                                             Drip rates at handoff:    amiodarone 1 mg/min (01/16/25 0659)    prismaSATE BGK 4/2.5 2,000 mL/hr at 01/15/25 2105    prismaSATE BGK 4/2.5 1,500 mL/hr at 01/16/25 0654    prismaSATE BGK 4/2.5 750 mL/hr at 01/16/25 0211    norepinephrine 6 mcg/min (01/16/25 0425)    DOBUTamine 2.5 mcg/kg/min (01/16/25 0425)    VASOpressin 0.03 Units/min (01/16/25 0425)    sodium chloride Stopped (01/15/25 1604)    dextrose         Hospital Course Daily Updates:  Admit Day# 0 1/15/25 Days  -Patient came down at shift change after rapid for unresponsiveness. Started on bipap before coming to ICU.  -Once in ICU slight improvements shown on gas after 1 hour.   -CRRT began at 1040  -Patient briefly responded to sternal rub and said \"yes\" \"no\" and \"stop\" but stopped. Repeat ABG drawn with no changes. No intubation at this

## 2025-01-16 NOTE — ACP (ADVANCE CARE PLANNING)
PATIENT/FAMILY PALLIATIVE CARE CONFERENCE    Date 1/16/2025   Time 11:16 AM                          Attending Leticia Lion MD     Facilitator: Colby ARMENDARIZ                                             Diagnosis: Atrial fibrillation with rapid ventricular response (HCC)     Purpose:   ? _X_ medical update     ? __ goal setting     ? _X_ EOL planning     ? __ f/u meeting   ? _X_ other  ACP around GOC and Code status    Decision Maker:  ? __ patient     ? __ DPOA-HC     ? _X_ family consensus     ? __ guardian    Participants: Step-dtr/hcpthomas, Joya; step-dtr Brianna; spouse, Crystal, Shift RN, Palcare RN     Current Challenges:  ? __ aspiration     ? __ cognitive failure     ? __ pneumonia     __ sepsis     ? __ vent removal    __ hypotension     _X_? other Acute resp failure, COPD, ICM EF 15-20%, CAD/ASHD, Afib, ESRD    Prognosis:  ? __ gradual improvement      _X_ expected continued challenges     Estimated time for recovery:  ? __ days/weeks     ? __ weeks/months     __ less than 1 year     _X_ depends on goals   ?   __ unknown    Estimated length of life:  ? __days/weeks     __ weeks/months     __ less than 1 year     __ depends on goals     __ unknown      _X_? death potential during this hospitalization     Process:  The current medical information including test results, various treatment    options, risk/benefits of all treatment options and understanding of patient  expressed/documented wishes were reviewed.  The family had opportunity to ask  questions.  Comments:  Ample opportunity for family to receive medical update, discuss current medical challenges and review the options for care.  Family asked several appropriate questions and answers were discussed.  Without team provocation, Joya shared w/ all that \"over [place], Ron talked to me in a private moment and told me he thought he was dying, there wasn't much more he felt he could continue to tolerate.\"   Then, Crystal shared \"he told me over

## 2025-01-16 NOTE — PROGRESS NOTES
01/15/25 2037   NIV Type   NIV Started/Stopped On   Equipment Type v60   Mode Bilevel   Mask Type Full face mask   Mask Size Large   Assessment   Respirations 21   Settings/Measurements   IPAP 14 cmH20   CPAP/EPAP 5 cmH2O   Vt (Measured) 717 mL   Rate Ordered 10   FiO2  40 %   Patient's Home Machine No   Alarm Settings   Alarms On Y   Low Pressure (cmH2O) 6 cmH2O   High Pressure (cmH2O) 30 cmH2O   Patient Observation   Patient Observations gel on nose and cheeks

## 2025-01-16 NOTE — PROGRESS NOTES
Patient seen and examined this am during ICU rounds.  Minimally responsive, on BiPAP.  Remains on CRRT, multiple pressors for hemodynamic support.  Still acidotic today, CXR images reviewed with complete left sided opacification.    Extensive conversation with patient's daughters and wife. Discussed possible need for intubation for bronchoscopy, airway support but patient with worsening clinical status and overall prognosis is poor.  Palliative present for family meeting. Decision at this time to focus on patient comfort and will stop CRRT, wean pressors and provide medications for respiratory distress/comfort. Hospice was discussed, will see how patient tolerates this afternoon as he likely could pass here and family understands this is a possibility. Multiple family members to visit today.    Updated RN and nephrology.  DNR-CC, comfort medications ordered.    Angelique Evans, APRN-CNP

## 2025-01-16 NOTE — PROGRESS NOTES
01/16/25 0856   NIV Type   NIV Started/Stopped On   Equipment Type v60   Mode Bilevel   Mask Type Full face mask   Mask Size Large   Assessment   Pulse (!) 133   Respirations 28   SpO2 91 %   Comfort Level Good   Using Accessory Muscles No   Mask Compliance Good   Skin Protection for O2 Device Yes   Orientation Middle;Bilateral   Location Cheek;Nose   Intervention(s) Skin Barrier   Settings/Measurements   PIP Observed 15 cm H20   IPAP 14 cmH20   CPAP/EPAP 5 cmH2O   Vt (Measured) 486 mL   Rate Ordered 10   FiO2  60 %   I Time/ I Time % 1 s   Minute Volume (L/min) 13.1 Liters   Mask Leak (lpm) 11 lpm   Patient's Home Machine No   Alarm Settings   Alarms On Y   Low Pressure (cmH2O) 6 cmH2O   High Pressure (cmH2O) 30 cmH2O   Delay Alarm 20 sec(s)   RR Low (bpm) 6   RR High (bpm) 40 br/min   Oxygen Therapy/Pulse Ox   O2 Therapy Oxygen   $Oxygen $Daily Charge   O2 Device PAP (positive airway pressure)   $Pulse Oximeter $Spot check (multiple/continuous)

## 2025-01-16 NOTE — PROGRESS NOTES
01/16/25 0400   NIV Type   $NIV $Daily Charge   NIV Started/Stopped On   Equipment Type v60   Mode Bilevel   Mask Type Full face mask   Mask Size Large   Assessment   Respirations 24   Settings/Measurements   IPAP 14 cmH20   CPAP/EPAP 5 cmH2O   Vt (Measured) 560 mL   Rate Ordered 10   FiO2  40 %   Mask Leak (lpm) 18 lpm   Patient's Home Machine No   Alarm Settings   Alarms On Y   Low Pressure (cmH2O) 6 cmH2O   High Pressure (cmH2O) 30 cmH2O   Patient Observation   Patient Observations gel on nose and mask

## 2025-01-16 NOTE — PROGRESS NOTES
Dropped pt to half rate amiodarone around ten pm.  Had also been trying to pull some fluid off as the pt was sounding wet, and had a decent blood pressure, and I had to NT suction him a couple of times.  I decreased the amount I was pulling on CRRT.   This did not help my RVR     Pt in persistent RVR, called Geovanny Boles and we have discussed the case and will be returning the amio to the full dose gtt rate at 1mg/min.      Jose Alberto

## 2025-01-16 NOTE — PROGRESS NOTES
Pt has no apical pulse, no blood pressure and no spontaneous respirations. Verified by 2 RN's. Time of death 1238. Attending physician and consults called.

## 2025-01-16 NOTE — PROGRESS NOTES
monitor  Pulmonary medicine following, appreciate recommendations  Assess for ventilator needs, settings as needed  Maintain oxygenation > 88%  Patient presently on pressor drips      Ongoing threat to life and/or bodily function without ongoing treatment due to:  ESRD, Hypoxic respiratory failure, CHF    Consults:      IP CONSULT TO NEPHROLOGY  IP CONSULT TO PODIATRY  IP CONSULT TO CRITICAL CARE  IP CONSULT TO PHARMACY  PHARMACY TO DOSE VANCOMYCIN  IP CONSULT TO PALLIATIVE CARE        --------------------------------------------------      Medications:        Infusion Medications    amiodarone 1 mg/min (01/16/25 1100)    prismaSATE BGK 4/2.5 2,000 mL/hr at 01/16/25 0958    prismaSATE BGK 4/2.5 1,500 mL/hr at 01/16/25 1026    prismaSATE BGK 4/2.5 750 mL/hr at 01/16/25 0753    norepinephrine 8 mcg/min (01/16/25 1100)    DOBUTamine 2.5 mcg/kg/min (01/16/25 1100)    VASOpressin 0.03 Units/min (01/16/25 1100)    sodium chloride Stopped (01/15/25 1604)    dextrose       Scheduled Medications    [START ON 1/17/2025] vancomycin  1,250 mg IntraVENous Q24H    pantoprazole  40 mg IntraVENous Daily    mupirocin   Each Nostril BID    cefepime  2,000 mg IntraVENous Q8H    midodrine  5 mg Oral TID WC    insulin glargine  14 Units SubCUTAneous Nightly    epoetin siddharth-epbx  10,000 Units IntraVENous Once per day on Tuesday Thursday Saturday    [Held by provider] metoprolol tartrate  25 mg Oral BID    ranolazine  500 mg Oral BID    [Held by provider] apixaban  5 mg Oral BID    atorvastatin  80 mg Oral Daily    busPIRone  10 mg Oral TID    calcium acetate  2 capsule Oral TID    clopidogrel  75 mg Oral Daily    DULoxetine  60 mg Oral Daily    [Held by provider] isosorbide mononitrate  30 mg Oral Daily    pregabalin  25 mg Oral Daily    [Held by provider] sacubitril-valsartan  1 tablet Oral BID    sodium chloride flush  5-40 mL IntraVENous 2 times per day    insulin lispro  0-8 Units SubCUTAneous 4x Daily AC & HS     PRN Meds: potassium  chloride, magnesium sulfate, calcium gluconate **OR** calcium gluconate **OR** calcium gluconate **OR** calcium gluconate, sodium phosphate 6 mmol in sodium chloride 0.9 % 250 mL IVPB **OR** sodium phosphate 12 mmol in sodium chloride 0.9 % 250 mL IVPB **OR** sodium phosphate 18 mmol in sodium chloride 0.9 % 500 mL IVPB **OR** sodium phosphate 24 mmol in sodium chloride 0.9 % 500 mL IVPB, sodium chloride flush **AND** sodium chloride flush, oxyCODONE, dilTIAZem, heparin (porcine), ipratropium 0.5 mg-albuterol 2.5 mg, midodrine, sodium chloride flush, sodium chloride, polyethylene glycol, acetaminophen **OR** acetaminophen, prochlorperazine, glucose, dextrose bolus **OR** dextrose bolus, glucagon (rDNA), dextrose      Physical Exam Performed:      General appearance:  No apparent distress  Respiratory:  Normal respiratory effort.   Cardiovascular:  Regular rate and rhythm.  Abdomen:  Soft, non-tender, non-distended.  Musculoskeletal:  No edema  Neurologic:  Non-focal  Psychiatric:  Alert and oriented    /83   Pulse (!) 116   Temp 97.9 °F (36.6 °C) (Axillary)   Resp 26   Ht 1.753 m (5' 9.02\")   Wt 97.4 kg (214 lb 11.7 oz)   SpO2 93%   BMI 31.70 kg/m²     Telemetry:      Personally reviewed and interpreted telemetry (Rhythm Strip) on 1/16/2025 with the following findings: Unremarkable    Diet: ADULT DIET; Regular; 4 carb choices (60 gm/meal); No Added Salt (3-4 gm); Low Potassium (Less than 3000 mg/day); Low Phosphorus (Less than 1000 mg); 1000 ml  ADULT ORAL NUTRITION SUPPLEMENT; Dinner; Renal Oral Supplement    DVT Prophylaxis: []PPx LMWH  []SQ Heparin  [x]IPC/SCDs  []Eliquis  []Xarelto  []Coumadin  [] Heparin Drip  []Other -      Code status: Full Code    PT/OT Eval Status:   [x]NOT yet ordered  []Ordered and Pending   []Seen with Recommendations for:   []Home independently  []Home w/ assist  []HHC  []SNF  []Acute Rehab    Multi-Disciplinary Rounds with Case Management completed on 1/16/2025 with the

## 2025-01-16 NOTE — PROGRESS NOTES
Nephrology Progress Note   [employer]      Sub/interval history  Igor Ann is a 54 year old male being seen for ESKD.    Patient was seen and examined in ICU. Patient remains on CRRT and BiPAP with limited responsiveness.  He is now on 2 pressors for BP support. Arterial line and Central lines placed yesterday.  Amiodarone gtt for Afib RVR, HRs 130-140s.  - Labs and Notes reviewed   - PMShx reviewed  - CRRT started 1/15/25, -850ml     ROS: limited due to patient factors   Social: No family at bedside.    Scheduled Meds:   [START ON 1/17/2025] vancomycin  1,250 mg IntraVENous Q24H    mupirocin   Each Nostril BID    cefepime  2,000 mg IntraVENous Q8H    midodrine  5 mg Oral TID WC    insulin glargine  14 Units SubCUTAneous Nightly    epoetin siddharth-epbx  10,000 Units IntraVENous Once per day on Tuesday Thursday Saturday    [Held by provider] metoprolol tartrate  25 mg Oral BID    ranolazine  500 mg Oral BID    [Held by provider] apixaban  5 mg Oral BID    atorvastatin  80 mg Oral Daily    busPIRone  10 mg Oral TID    calcium acetate  2 capsule Oral TID    clopidogrel  75 mg Oral Daily    DULoxetine  60 mg Oral Daily    [Held by provider] isosorbide mononitrate  30 mg Oral Daily    pantoprazole  40 mg Oral Daily    pregabalin  25 mg Oral Daily    [Held by provider] sacubitril-valsartan  1 tablet Oral BID    sodium chloride flush  5-40 mL IntraVENous 2 times per day    insulin lispro  0-8 Units SubCUTAneous 4x Daily AC & HS     Continuous Infusions:   amiodarone 1 mg/min (01/16/25 0800)    prismaSATE BGK 4/2.5 2,000 mL/hr at 01/16/25 0753    prismaSATE BGK 4/2.5 1,500 mL/hr at 01/16/25 0654    prismaSATE BGK 4/2.5 750 mL/hr at 01/16/25 0753    norepinephrine 6 mcg/min (01/16/25 0800)    DOBUTamine 2.5 mcg/kg/min (01/16/25 0800)    VASOpressin 0.03 Units/min (01/16/25 0800)    sodium chloride Stopped (01/15/25 1604)    dextrose       PRN Meds:.potassium chloride, magnesium sulfate, calcium gluconate

## 2025-01-16 NOTE — ACP (ADVANCE CARE PLANNING)
Advance Care Planning     Palliative Team Advance Care Planning (ACP) Conversation    Date of Conversation: 01/16/25    Individuals present for the conversation: Legal healthcare agent named below/step-dtr, Joya along w/ her spouse and her sister, Brianna     ACP documents on file prior to discussion:  -Power of  for Healthcare  -Living Will    Previously completed document/s not on file: N/A    Healthcare Decision Maker:    Primary Decision Maker: Joya Land - Chuck Child - [phone]    Secondary Decision Maker: Sridevi Salmeron - Brother/Sister - [phone]    Supplemental (Other) Decision Maker: IvanBrianna guzman - Chuck Child - [phone]     Resuscitation Status:   Code Status: Full Code  Pt wrote in his AD that his \"DNR is on file\" as pt agreed to an [place] DNR, which he d/c w/ in April, 2023.  Yet, in other in d/w pt, he preferred to remain full code.  Verified that pt also has a full code @ the nursing home.    Documentation Completed:  -No new documents completed.    Palliative Care Initial Note  Palliative Care Admit date:  1/16/25  Reason for c/s:  [facility]    Advance Directives:  Have reviewed the HCPOA and Living Will in this EMR; pt designated his step-dtr, Joya Land, as his healthcare proxy.  He designated his 1st alternate, Sridevi Salmeron, and a supplemental alternate, Brianna Reyes.  Pt had completed an AD from 2018 but that is now null & void given his latest directive which he executed in 2023. In speaking w/ Joya @ [facility], confirmed for her that pt designated her as his healthcare proxy and she is agreeable to fulfilling that role.      Per May, 2024 [facility] NP note: \"Reviewed the new Advance Directives in the chart with him from January 2024. He removed his wife Crystal as a proxy. He confirms they are still  and living together, but he does not want her making decisions for him.  He says we can still call her and talk with her if we want.\"    Plan of care/goals: Pt is well known

## 2025-01-16 NOTE — PROGRESS NOTES
01/15/25 2343   NIV Type   NIV Started/Stopped On   Equipment Type v60   Mode Bilevel   Mask Type Full face mask   Mask Size Large   Assessment   Respirations 25   Settings/Measurements   IPAP 14 cmH20   CPAP/EPAP 5 cmH2O   Vt (Measured) 686 mL   Rate Ordered 10   FiO2  40 %   Patient's Home Machine No   Alarm Settings   Alarms On Y   Low Pressure (cmH2O) 6 cmH2O   High Pressure (cmH2O) 30 cmH2O

## 2025-01-16 NOTE — PROGRESS NOTES
[facility]   Daily Cardiovascular Progress Note    Admit Date: 1/7/2025    Chief complaint: sob  HPI:     Patient is nonresponsive to verbal and tactile stimuli, he is on noninvasive ventilatory support with BiPAP, history is not obtainable from him.  History is per nursing staff at the bedside as well as chart review, patient had respiratory distress requiring transfer to the ICU this week.  He is currently also undergoing additional dialysis, he has had intermittent hypotension and afib w/ rvr.       Medications/Labs all Reviewed:  Patient Active Problem List   Diagnosis    Sepsis without acute organ dysfunction ([facility])    CHF (congestive heart failure) ([facility])    Chronic obstructive pulmonary disease ([facility])    Coronary artery disease involving native coronary artery of native heart    PVD (peripheral vascular disease) ([facility])    Bicuspid aortic valve    Bilateral hilar adenopathy syndrome    Claudication in peripheral vascular disease ([facility])    Hypertension    Diabetic neuropathy ([facility])    Type 2 DM with hypertension and ESRD on dialysis ([facility])    Passive smoke exposure    Depression with anxiety    Community acquired pneumonia of left lower lobe of lung    Obesity    ZAINAB on CPAP    Degeneration of lumbar or lumbosacral intervertebral disc    Lumbar radiculopathy    Lumbosacral spondylosis without myelopathy    Biliary colic    Symptomatic cholelithiasis    Gastroparesis due to DM ([facility])    Elevated troponin    Angina, class IV ([facility])    Dyspnea    Dyslipidemia    Chronic systolic (congestive) heart failure ([facility])    Ischemic cardiomyopathy    Tobacco abuse    CVA (cerebral vascular accident) ([facility])    History of CVA (cerebrovascular accident)    Type 2 diabetes mellitus without complication, without long-term current use of insulin ([facility])    ZAINAB treated with BiPAP    Pleural effusion    Chronic anemia    Nonrheumatic aortic valve stenosis    Mucus plugging of bronchi    Hemodialysis-associated hypotension     aortic valve replacement)    Syncope and collapse    PAF (paroxysmal atrial fibrillation) ([facility])    Bilateral leg weakness    GBS (Guillain-Blanchard syndrome) ([facility])    Sinus pause    Weakness of both lower extremities    Sinus bradycardia    Ataxia    PAD (peripheral artery disease) ([facility])    Cellulitis of right foot    Iliac artery occlusion, right ([facility])    Cellulitis and abscess of hand    Uncontrolled type 2 diabetes mellitus with hyperglycemia ([facility])    Acute encephalopathy    Acute hypoxemic respiratory failure    Acute CVA (cerebrovascular accident) ([facility])    Speech problem    Urinary tract infection with hematuria    Respiratory failure with hypoxia    Acute respiratory failure with hypercapnia    Acute pulmonary edema    Grade II diastolic dysfunction    Shock circulatory    Smoker    Normocytic normochromic anemia    NSTEMI (non-ST elevated myocardial infarction) ([facility])    SIRS (systemic inflammatory response syndrome) ([facility])    Atrial flutter ([facility])    Liberty-rectal abscess    Somnolence    Pulmonary edema with diastolic CHF, NYHA class 3 ([facility])    Respiratory failure    Former smoker    Cardiomyopathy ([facility])    Morbid obesity with BMI of 40.0-44.9, adult    Hypoglycemia    Altered mental status    Hypertensive emergency    Dyspnea and respiratory abnormalities    Acute respiratory failure with hypoxia and hypercapnia    HFrEF (heart failure with reduced ejection fraction) ([facility])    Pericardial effusion    Hypervolemia    Pneumonia of left lower lobe due to infectious organism    Acute on chronic respiratory failure with hypercapnia    Compression atelectasis    Type 2 myocardial infarction ([facility])    Acute respiratory failure with hypoxemia    Hyperkalemia    Hyponatremia    Metabolic acidemia    Pulmonary infiltrate    Leukocytosis    Hypertensive urgency    Severe sepsis ([facility])    Positive blood culture    Chest heaviness    Abnormal cardiovascular stress test    Ulcer with gangrene, with unspecified severity ([facility])     Dr. Talbot

## 2025-01-16 NOTE — PROGRESS NOTES
CHF Care Plan      Patient's EF (Ejection Fraction) is less than 40%    Heart Failure Medications:  Diuretics:: None  Chronic HD on T/TH/S [facility], skips HD often, noncompliant    (One of the following REQUIRED for EF </= 40%/SYSTOLIC FAILURE but MAY be used in EF% >40%/DIASTOLIC FAILURE)            Pt is treated chronically by nephro/cardiac and is on Entresto, Imdur, and Toprol.  No diuretic.  Coag management via elquis.  Some of these meds are on hold r/t pt precarious state in ICU today.    ...................................................................................................................................................        Patient's weights and intake/output reviewed and appears to have lost some weight from 98.3kg to 97.4 kg this am, bed scale, PT CANNOT STAND ON SCALE, folks.      Daily Weight log at bedside, patient/family participation in use of log: pt unable to participate\" (and is chronically noncompliant with weighing and HD at home.  ESRD and ESCardiac Disease per notes of Mds )    Patient's current weight today shows a difference of 2 lbs less than last documented weight.      Intake/Output Summary (Last 24 hours) at 1/16/2025 0554  Last data filed at 1/16/2025 0500  Gross per 24 hour   Intake 1177.23 ml   Output 1754 ml   Net -576.77 ml       Education Booklet Provided: yes its here but Ron isn't engaged on a good day, today he is still altered.     Comorbidities Reviewed Yes    Patient has a past medical history of Ambulatory dysfunction, Aortic stenosis, Arthritis, Asthma, Bilateral hilar adenopathy syndrome, CAD (coronary artery disease), Cardiomyopathy (HCC), CHF, EF 35-40% (July 2024), Chronic pain, COPD (chronic obstructive pulmonary disease) ([facility]), CVA (cerebral vascular accident) ([facility]), Depression, Diabetes mellitus ([facility]), Difficult intravenous access, Emphysema of lung ([facility]), ESRD (end stage renal disease) on dialysis ([facility]), Fear of needles, Gastric ulcer, GERD  (gastroesophageal reflux disease), HD, T/R/Sa, Heart valve problem, History of spinal fracture, History of transcatheter aortic valve replacement (TAVR), Hx of blood clots, Hyperlipidemia, Hypertension, MI (myocardial infarction) (HCC), Neuromuscular disorder (HCC), Numbness and tingling in left arm, Pneumonia, PONV (postoperative nausea and vomiting), Prolonged emergence from general anesthesia, Sleep apnea, Stroke (HCC), TIA (transient ischemic attack), and Unspecified diseases of blood and blood-forming organs.     >>For CHF and Comorbidity documentation on Education Time and Topics, please see Education Tab    Ashley the wife isn't here and veronika isn't up for education at this time.     HOWEVER, as I was typing this note, the daughter, Joya called.  I spoke with her about 20min about his condition and need for ABSOLUTE compliance with HD upon return to [facility].  Verbalized understanding.        CHF Education    Learners:  Other- daughter, Joya   Readineess:    Veronika is alert to himself only tonight, apologies    Joya was engaged and voiced understanding to me (620am)  Method:   none utilized.   Response:    Needs Reinforcement and No Evidence of Learning    Comments: Brianna seems to understand the guarded prognosis and need for future compliance       Time Spent: 25 min       Pt resting in bed at this time on BiPAP. Pt denies shortness of breath. Pt without lower extremity edema.     Patient and/or Family's stated Goal of Care this Admission: reduce shortness of breath, increase activity tolerance, better understand heart failure and disease management, be more comfortable, and reduce lower extremity edema prior to discharge    Kendra Abrams MSN RN     :

## 2025-01-16 NOTE — PROGRESS NOTES
[facility]   PULMONARY AND CRITICAL CARE MEDICINE    CRITICAL CARE PROGRESS NOTE      CC: Respiratory failure    SUBJECTIVE / INTERVAL HISTORY:  More alert today but not able to keep up with ventilatory needs.     ROS:  Denies headache, nausea or chest pain.\    24HR INTAKE/OUTPUT:    Intake/Output Summary (Last 24 hours) at 2025 1054  Last data filed at 2025 1000  Gross per 24 hour   Intake 1446.76 ml   Output 2149 ml   Net -702.24 ml     NIPPV  - IPAP: 14 cmH20  - CPAP/EPAP: 5 cmH2O  - Vt (Measured): 496 mL  - FiO2 : 60 %  - Minute Volume (L/min): 12 Liters        [START ON 2025] vancomycin  1,250 mg IntraVENous Q24H    pantoprazole  40 mg IntraVENous Daily    mupirocin   Each Nostril BID    cefepime  2,000 mg IntraVENous Q8H    midodrine  5 mg Oral TID WC    insulin glargine  14 Units SubCUTAneous Nightly    epoetin siddharth-epbx  10,000 Units IntraVENous Once per day on     [Held by provider] metoprolol tartrate  25 mg Oral BID    ranolazine  500 mg Oral BID    [Held by provider] apixaban  5 mg Oral BID    atorvastatin  80 mg Oral Daily    busPIRone  10 mg Oral TID    calcium acetate  2 capsule Oral TID    clopidogrel  75 mg Oral Daily    DULoxetine  60 mg Oral Daily    [Held by provider] isosorbide mononitrate  30 mg Oral Daily    pregabalin  25 mg Oral Daily    [Held by provider] sacubitril-valsartan  1 tablet Oral BID    sodium chloride flush  5-40 mL IntraVENous 2 times per day    insulin lispro  0-8 Units SubCUTAneous 4x Daily AC & HS       PHYSICAL EXAMINATION:  /86   Pulse (!) 115   Temp 97.9 °F (36.6 °C) (Axillary)   Resp 27   Ht 1.753 m (5' 9.02\")   Wt 97.4 kg (214 lb 11.7 oz)   SpO2 93%   BMI 31.70 kg/m²   CURRENT PULSE OXIMETRY:  SpO2: 93 %  24HR PULSE OXIMETRY RANGE:  SpO2  Av.2 %  Min: 84 %  Max: 100 %     Gen: On NIV, more alert  HEENT: PERRL, EOMI, OP nl  Lung:  Diminsihed  CV: RRR without M/R/R  Abd: +BS, soft,

## 2025-01-16 NOTE — PROGRESS NOTES
01/16/25 1209   NIV Type   NIV Started/Stopped On   Equipment Type v60   Mode Bilevel   Mask Type Full face mask   Mask Size Large   Assessment   Pulse (!) 111   Respirations 25   Comfort Level Good   Using Accessory Muscles No   Mask Compliance Good   Skin Protection for O2 Device Yes   Orientation Middle;Bilateral   Location Cheek;Nose   Intervention(s) Skin Barrier   Settings/Measurements   PIP Observed 14 cm H20   IPAP 14 cmH20   CPAP/EPAP 5 cmH2O   Vt (Measured) 496 mL   Rate Ordered 10   FiO2  60 %   I Time/ I Time % 1 s   Minute Volume (L/min) 12 Liters   Mask Leak (lpm) 23 lpm   Patient's Home Machine No   Alarm Settings   Alarms On Y   Low Pressure (cmH2O) 6 cmH2O   High Pressure (cmH2O) 30 cmH2O   Delay Alarm 20 sec(s)   RR Low (bpm) 6   RR High (bpm) 40 br/min   Oxygen Therapy/Pulse Ox   O2 Therapy Oxygen   O2 Device PAP (positive airway pressure)

## 2025-01-17 NOTE — PROGRESS NOTES
Physician Progress Note      PATIENT:               ALLEN HARDWICK  CSN #:                  [id number]  :                       1970  ADMIT DATE:       2025 11:11 AM  DISCH DATE:        2025 4:46 PM  RESPONDING  PROVIDER #:        Leticia Akins MD          QUERY TEXT:    Patient admitted with afib and acute CHF and noted to have chest pain and   elevated troponin.  If possible, please document in the progress notes and   discharge summary if you are evaluating and/or treating any of the following:    The medical record reflects the following:  Risk Factors: ESRD, acute CHF, afib RVR, diabetes  Clinical Indicators: Troponin [phone].  Per cardiology notes \"Chest   pain/Elevated troponins. chronically elevated troponins d/t fluid overload;   not an ACS\". Per progress notes \"Chest pain, with known h/o CAD.  Nuclear   stress test with expected infarcts, also mild darrick-infarct ischemia.    Cardiology recommended medical management.  Aspirin stopped per cardiology,   continue clopidogrel, statin, metoprolol.  Cardiology stopped ISMN due to   hypotension\".  Treatment: Cardiology consult, stress test, serial labs, supportive care  Options provided:  -- Type 2 MI due to acute CHF  -- Non-ischemic myocardial injury due to ESRD  -- Other - I will add my own diagnosis  -- Disagree - Not applicable / Not valid  -- Disagree - Clinically unable to determine / Unknown  -- Refer to Clinical Documentation Reviewer    PROVIDER RESPONSE TEXT:    This patient has a non-ischemic myocardial injury due to ESRD    Query created by: Sabra Allison on 1/15/2025 12:20 PM      Electronically signed by:  Leticia Akins MD 2025 11:54 PM

## 2025-01-17 NOTE — DISCHARGE SUMMARY
Hospital Medicine Discharge Summary    Patient: Igor Ann   : 1970     Hospital:  [facility]  Admit Date: 2025   Discharge Date: 2025    Disposition:  []Home   []HHC  []SNF  []Acute Rehab  []LTAC  []Hospice  Code status:  []Full  []DNR/CCA  []Limited (DNR/CCA with Do Not Intubate)  []DNRCC  Condition at Discharge: Stable  Primary Care Provider: Vonnie Cleveland APRN - NP    Admitting Provider: Taz Hernandez MD  Discharge Provider: Leticia Akins MD     Discharge Diagnoses:      Active Hospital Problems    Diagnosis     S/P TAVR (transcatheter aortic valve replacement) [Z95.2]      Priority: Medium    Atrial fibrillation with rapid ventricular response (HCC) [I48.91]     Volume overload [E87.70]     PAD (peripheral artery disease) ([facility]) [I73.9]     ESRD (end stage renal disease) on dialysis ([facility]) [N18.6, Z99.2]        Presenting Admission History:      The patient is a pleasant 54 Y M with a h/o COPD on 4LNC, HTN, DM2 with partial R foot amputation, multivessel CAD deemed not a candidate for CABG s/p stents (most recently in 10/2024), ischemic cardiomyopathy with EF 35-40%, CHF, Afib, CVA, bicuspid AS s/p 2019 TAVR, PAD s/p R iliac stent, and ESRD on HD.  It looks like he was admitted 7x last year, often for chest pain and/or CHF (sometimes misses HD).  He lives at [facility].  The patient presented to [facility] on  with chest pain and dyspnea.  They saw pulmonary edema and bilateral pleural effusions on his CXR and nephrology was consulted for HD.  Cardiology was consulted because of the elevated troponin and recommended transfer to [facility] for interventional cardiology eval.  Upon arrival here the patient was breathing easily but did report ongoing dyspnea above baseline, worse orthopnea, worse edema in his abdomen.  No fevers or chills.  No further chest pain.      Assessment/Plan:      #AHRF  #Acute on chronic CHF  #ESRD on HD  #A. Fib with RVR  #S/p  TAVR  EF 25 - 30% 25  During admission:  Nephrology following, appreciated recommendations: Plan for CRRT  Eliquis held  Plavix, statin in place  Metoprolol held, Entresto held  Amiodarone and Dobutamine drips in place  Palliative care followed  Maintained hemodynamic status, MAP > 65  Adjusted pressor support as needed     #COPD  #Possible CAP  During admission:  Cefepime, Vancomycin in place  Continued to monitor  Pulmonary medicine following, appreciated recommendations  Assess for ventilator needs, settings as needed  Maintained oxygenation > 88%  Patient presently on pressor drips    Patient  today at 12:38AM. Patient family decided to stop pursing medical care and code status DNR CC decision. Patient family denied autopsy.    Physical Exam Performed:      /84   Pulse (!) 111   Temp 98 °F (36.7 °C) (Axillary)   Resp 25   Ht 1.753 m (5' 9.02\")   Wt 97.4 kg (214 lb 11.7 oz)   SpO2 (!) 87%   BMI 31.70 kg/m²       General appearance:  No apparent distress, appears stated age  Respiratory:  None  Cardiovascular:  None  Abdomen:  Soft, non-tender, non-distended.  Musculoskeletal:  No edema  Neurologic:  Non-focal    Patient Discharge Instructions:        The patient was seen and examined on day of discharge and this discharge summary is in conjunction with any daily progress note from day of discharge. Time spent on discharge: 35 minutes in the examination, evaluation, counseling and review of medications and discharge plan.    ------------------------------------------------------------------------------------------------------------------------------------------------------    Discharge Medications:   Discharge Medication List as of 2025  4:46 PM        Discharge Medication List as of 2025  4:46 PM        Discharge Medication List as of 2025  4:46 PM        CONTINUE these medications which have NOT CHANGED    Details   pregabalin (LYRICA) 25 MG capsule Take 1 capsule by mouth

## 2025-01-22 NOTE — PROGRESS NOTES
Physician Progress Note      PATIENT:               ALLEN HARDWICK  CSN #:                  [id number]  :                       1970  ADMIT DATE:       2025 11:11 AM  DISCH DATE:        2025 4:46 PM  RESPONDING  PROVIDER #:        Leticia Akins MD          QUERY TEXT:    Pt admitted with afib and acute CHF and has shock documented and is being   treated  with dobutamine.  If possible, please document in progress notes and   discharge summary further specificity regarding the type of shock:    The medical record reflects the following:  Risk Factors: ESRD, diabetes, cardiomyopathy, COPD  Clinical Indicators: Afib RVR, acute CHF.  Per progress note  \"Atrial   fibrillation, transition to heparin.  No bblocker d/t hypotension/shock.  Add   dig iv.   Continue amio\".  Treatment: Dobutamine, ICU care, cardiology consult, serial labs, supportive   care  Options provided:  -- Cardiogenic Shock  -- Obstructive Shock  -- Other shock, Please document type of shock  -- Other - I will add my own diagnosis  -- Disagree - Not applicable / Not valid  -- Disagree - Clinically unable to determine / Unknown  -- Refer to Clinical Documentation Reviewer    PROVIDER RESPONSE TEXT:    This patient is in cardiogenic shock.    Query created by: Sabra Allison on 2025 7:52 AM      QUERY TEXT:    Pt admitted with afib. Pt noted to also have acute CHF. If possible, please   document in progress notes and discharge summary the etiology of CHF, if able   to be determined.    The medical record reflects the following:  Risk Factors: Afib, acute CHF, ESRD  Clinical Indicators: aortic valve stenosis, HTN, ischemic cardiomyopathy, HTN,   afib, S/p TAVR  Treatment: IV lasix, serial labs, supportive care, palliative care, Amiodarone   and Dobutamine  Options provided:  -- CHF due to Hypertensive Heart Disease  -- CHF due to Hypertensive Heart Disease and CAD  -- CHF not due to Hypertension but due to CAD  -- CHF due

## (undated) DEVICE — STOCKINETTE,IMPERVIOUS,12X48,STERILE: Brand: MEDLINE

## (undated) DEVICE — DISPOSABLE PULLBACK SLED FOR MOTORDRIVE

## (undated) DEVICE — STERILE PVP: Brand: MEDLINE INDUSTRIES, INC.

## (undated) DEVICE — Z DISCONTINUED NO SUB IDED GLOVE SURG SZ 6 L12IN FNGR THK13MIL WHT ISOLEX POLYISOPRENE

## (undated) DEVICE — SOLUTION IRRIG 2000ML 0.9% SOD CHL USP UROMATIC PLAS CONT

## (undated) DEVICE — DISH PETRI ST LF

## (undated) DEVICE — SOLUTION IV IRRIG 500ML 0.9% SODIUM CHL 2F7123

## (undated) DEVICE — INTENDED FOR TISSUE SEPARATION, AND OTHER PROCEDURES THAT REQUIRE A SHARP SURGICAL BLADE TO PUNCTURE OR CUT.: Brand: BARD-PARKER ® DISPOSABLE SCALPELS

## (undated) DEVICE — TROCAR: Brand: KII SLEEVE

## (undated) DEVICE — GLOVE SURG SZ 65 THK91MIL LTX FREE SYN POLYISOPRENE

## (undated) DEVICE — KIT ART LN 20GA L12CM FEP RADPQ 0.025X13.75IN SPR GWIRE

## (undated) DEVICE — ENDOSCOPIC KIT 2 12 FT OP4 DE2 GWN SYR

## (undated) DEVICE — Device

## (undated) DEVICE — BANDAGE,GAUZE,4.5"X4.1YD,STERILE,LF: Brand: MEDLINE

## (undated) DEVICE — CATH URETH 24FR DOVER RED LTX

## (undated) DEVICE — PINNACLE INTRODUCER SHEATH: Brand: PINNACLE

## (undated) DEVICE — COVER XR CASS W20XL41IN UNIV ADH STRP

## (undated) DEVICE — 3M™ TEGADERM™ TRANSPARENT FILM DRESSING FRAME STYLE WITH BORDER, 1616, 4 IN X 4-3/4 IN (10 CM X 12 CM), 50/CT 4CT/CASE: Brand: 3M™ TEGADERM™

## (undated) DEVICE — 5FR MEDTRONIC PIG  110CM

## (undated) DEVICE — EXCHANGE DEVICE: Brand: TRAPPER™

## (undated) DEVICE — Z DISCONTINUED USE 2218136 SUTURE ETHILON SZ 3-0 L30IN NONABSORBABLE BLK FSL L30MM 3/8 1671H

## (undated) DEVICE — MEDTRONIC AR1 CATHETER

## (undated) DEVICE — TROCAR: Brand: KII FIOS FIRST ENTRY

## (undated) DEVICE — 3M™ TEGADERM™ TRANSPARENT FILM DRESSING FRAME STYLE, 1626W, 4 IN X 4-3/4 IN (10 CM X 12 CM), 50/CT 4CT/CASE: Brand: 3M™ TEGADERM™

## (undated) DEVICE — DRESSING WND SM W2.5XL4IN BRTH W/ CATH SECUREMENT AND

## (undated) DEVICE — RADPAD

## (undated) DEVICE — COVIDIEN  WHOLEY GUIDE WIRE

## (undated) DEVICE — DRESSING,GAUZE,XEROFORM,CURAD,5"X9",ST: Brand: CURAD

## (undated) DEVICE — CUSTOM PACK: Brand: UNBRANDED

## (undated) DEVICE — SPONGE LAP W18XL18IN WHT COT 4 PLY FLD STRUNG RADPQ DISP ST 2 PER PACK

## (undated) DEVICE — GAUZE,SPONGE,4"X4",8PLY,STRL,LF,10/TRAY: Brand: MEDLINE

## (undated) DEVICE — GLOVE SURG SZ 8 L11.77IN FNGR THK9.8MIL STRW LTX POLYMER

## (undated) DEVICE — Device: Brand: ASAHI CORSAIR PRO XS

## (undated) DEVICE — EVACUATED CONTAINER, 1000 ML

## (undated) DEVICE — Z INACTIVE USE 2863696 DRESSING PETRO W3XL8IN N ADH OIL EMUL GZ CURAD

## (undated) DEVICE — PRECISION THIN (9.0 X 0.38 X 25.0MM)

## (undated) DEVICE — SUPPORT WRST AD W3.5XL9IN DIA14.5IN ART SFT ADJ HK AND LOOP

## (undated) DEVICE — GUIDE CATHETER: Brand: RUNWAY™

## (undated) DEVICE — CATHETER GUID 7FR L100CM L COR S STL PTFE AL0.75 HYBRID

## (undated) DEVICE — PRESSURE MONITORING SET: Brand: TRUWAVE

## (undated) DEVICE — 20 ML SYRINGE LUER-LOCK TIP: Brand: MONOJECT

## (undated) DEVICE — SUTURE MCRYL + SZ 4-0 L18IN ABSRB UD L19MM PS-2 3/8 CIR MCP496G

## (undated) DEVICE — SUTURE PROL SZ 2-0 L30IN NONABSORBABLE BLU L26MM SH 1/2 CIR 8833H

## (undated) DEVICE — HYPODERMIC SAFETY NEEDLE: Brand: MAGELLAN

## (undated) DEVICE — PERCLOSE™ PROSTYLE™ SUTURE-MEDIATED CLOSURE AND REPAIR SYSTEM: Brand: PERCLOSE™ PROSTYLE™

## (undated) DEVICE — Device: Brand: PROWATER

## (undated) DEVICE — SHEET,DRAPE,53X77,STERILE: Brand: MEDLINE

## (undated) DEVICE — DRAPE,U/ SHT,SPLIT,PLAS,STERIL: Brand: MEDLINE

## (undated) DEVICE — PREMIUM WET SKIN PREP TRAY: Brand: MEDLINE INDUSTRIES, INC.

## (undated) DEVICE — SUTURE NONABSORBABLE MONOFILAMENT 3-0 PS-1 18 IN BLK ETHILON 1663H

## (undated) DEVICE — LOWER EXTREMITY: Brand: MEDLINE INDUSTRIES, INC.

## (undated) DEVICE — MICROCATHETER: Brand: MAMBA™ FLEX

## (undated) DEVICE — SUTURE VCRL + SZ 3-0 L27IN ABSRB UD L26MM SH 1/2 CIR VCP416H

## (undated) DEVICE — Device: Brand: GRAND SLAM

## (undated) DEVICE — GOWN,SIRUS,POLYRNF,BRTHSLV,XL,30/CS: Brand: MEDLINE

## (undated) DEVICE — Z DISCONTINUED USE 2516375 APPLICATOR MEDICATED 3 CC CLR STRL CHLORAPREP

## (undated) DEVICE — SYSTEM CLOSURE 6-12 FR VEN VASC VASCADE MVP

## (undated) DEVICE — Y-TYPE TUR/BLADDER IRRIGATION SET, REGULATING CLAMP

## (undated) DEVICE — NEEDLE HYPO 18GA L1.5IN THN WALL PIVOTING SHLD BVL ORIENTED

## (undated) DEVICE — BASIC SINGLE BASIN 1-LF: Brand: MEDLINE INDUSTRIES, INC.

## (undated) DEVICE — SUTURE VICRYL + SZ 3-0 L18IN ABSRB UD SH 1/2 CIR TAPERCUT NDL VCP864D

## (undated) DEVICE — SOLUTION IV 1000ML LAC RINGERS PH 6.5 INJ USP VIAFLX PLAS

## (undated) DEVICE — GLIDESHEATH SLENDER STAINLESS STEEL KIT: Brand: GLIDESHEATH SLENDER

## (undated) DEVICE — GLOVE,SURG,SENSICARE SLT,LF,PF,7: Brand: MEDLINE

## (undated) DEVICE — BANDAGE COMPR W4INXL15FT BGE E SGL LAYERED CLP CLSR

## (undated) DEVICE — CORONARY IMAGING CATHETER: Brand: OPTICROSS™ 6 HD

## (undated) DEVICE — PACK PROCEDURE SURG GEN LAPAROSCOPY CDS

## (undated) DEVICE — SUTURE VCRL + SZ 3-0 L18IN ABSRB UD SH 1/2 CIR TAPERCUT NDL VCP864D

## (undated) DEVICE — CATHETER DIAG 5FR L100CM LUMN ID0.047IN JL4 CRV 0 SIDE H

## (undated) DEVICE — YANKAUER,BULB TIP,W/O VENT,RIGID,STERILE: Brand: MEDLINE

## (undated) DEVICE — Z CONVERTED USE 2715898 SWABSTICK MEDICATED W1.75XL6.5IN 1.6ML 3.15% CHG 70% ISO

## (undated) DEVICE — HANDPIECE SET WITH HIGH FLOW TIP AND SUCTION TUBE: Brand: INTERPULSE

## (undated) DEVICE — GLOVE ORANGE PI 7   MSG9070

## (undated) DEVICE — GOWN SIRUS NONREIN LG W/TWL: Brand: MEDLINE INDUSTRIES, INC.

## (undated) DEVICE — APPLICATOR PREP 26ML 0.7% IOD POVACRYLEX 74% ISO ALC ST

## (undated) DEVICE — GLOVE ORANGE PI 7 1/2   MSG9075

## (undated) DEVICE — SWAN-GANZ BIPOLAR PACING CATHETER: Brand: SWAN-GANZ

## (undated) DEVICE — STANDARD HYPODERMIC NEEDLE,POLYPROPYLENE HUB: Brand: MONOJECT

## (undated) DEVICE — DESTINATION PERIPHERAL GUIDING SHEATH: Brand: DESTINATION

## (undated) DEVICE — SPLINT THMB W4XL30IN FBRGLS PD PRECUT LTWT DURABLE FAST SET

## (undated) DEVICE — INTRODUCER SHTH 7FR CANN L23CM DIL TIP 35MM ORNG TUNGSTEN

## (undated) DEVICE — GOWN SIRUS NONREIN XL W/TWL: Brand: MEDLINE INDUSTRIES, INC.

## (undated) DEVICE — SUTURE NONABSORBABLE MONOFILAMENT 6-0 BV-1 1X30 IN PROLENE 8709H

## (undated) DEVICE — 3M™ STERI-STRIP™ REINFORCED ADHESIVE SKIN CLOSURES, R1546, 1/4 IN X 4 IN (6 MM X 100 MM), 10 STRIPS/ENVELOPE: Brand: 3M™ STERI-STRIP™

## (undated) DEVICE — SET CATH 20GA L1.75IN RAD ART POLYUR RADPQ W/ INTEGR

## (undated) DEVICE — GAUZE SPONGES,8 PLY: Brand: CURITY

## (undated) DEVICE — 3M™ TEGADERM™ TRANSPARENT FILM DRESSING FRAME STYLE, 1626, 4 IN X 4-3/4 IN (10 CM X 12 CM), 50/CT 4CT/CASE: Brand: 3M™ TEGADERM™

## (undated) DEVICE — SUTURE ETHLN SZ 3-0 L30IN NONABSORBABLE BLK FSL L30MM 3/8 1671H

## (undated) DEVICE — CANNULA SEAL

## (undated) DEVICE — GAUZE,PACKING STRIP,IODOFORM,1/2"X5YD,ST: Brand: CURAD

## (undated) DEVICE — 3M™ TEGADERM™ TRANSPARENT FILM DRESSING FRAME STYLE, 1624W, 2-3/8 IN X 2-3/4 IN (6 CM X 7 CM), 100/CT 4CT/CASE: Brand: 3M™ TEGADERM™

## (undated) DEVICE — FORCEP BX STD CAP 240CM RAD JAW 4

## (undated) DEVICE — Z DISCONTINUED GLOVE SURG SZ 7 L12IN FNGR THK13MIL WHT ISOLEX POLYISOPRENE

## (undated) DEVICE — YANKAUER,OPEN TIP,W/O VENT,STERILE: Brand: MEDLINE INDUSTRIES, INC.

## (undated) DEVICE — ELECTRODE ELECSURG NDL 2.8 INX7.2 CM COAT INSUL EDGE

## (undated) DEVICE — DEVICE INFL W/ HEM VLV TORQ

## (undated) DEVICE — CATH LAB PACK: Brand: MEDLINE INDUSTRIES, INC.

## (undated) DEVICE — PENCIL ES L3M ROCK SWCH S STL HEX LOK BLDE ELECTRD HOLSTER

## (undated) DEVICE — CATHETER IVL C2PLUS SHOCKWAVE 4.0MM X 12MM

## (undated) DEVICE — CATH BLLN ANGIO 4X15MM NC EUPHORIA RX

## (undated) DEVICE — MICROSURGICAL DILATATION DEVICE: Brand: WOLVERINE CORONARY CUTTING BALLOON

## (undated) DEVICE — SYRINGE MED 10ML LUERLOCK TIP W/O SFTY DISP

## (undated) DEVICE — OPTIFOAM GENTLE LIQUITRAP, SACRUM, 7"X7": Brand: MEDLINE

## (undated) DEVICE — Device: Brand: ELCA CORONARY LASER ATHERECTOMY CATHETER

## (undated) DEVICE — VALVE HEMSTAT 8FR INNR LUMN CRSS SLT SEAL DSGN WATCHDOG

## (undated) DEVICE — SUTURE MCRYL SZ 4-0 L18IN ABSRB UD L19MM PS-2 3/8 CIR PRIM Y496G

## (undated) DEVICE — FOGARTY - HYDRAGRIP SURGICAL - CLAMP INSERTS: Brand: FOGARTY SOFTJAW

## (undated) DEVICE — 260 CM J TIP WIRE .035

## (undated) DEVICE — WIRE .025 SWAN J 3MM 150 CM

## (undated) DEVICE — BLADELESS OBTURATOR: Brand: WECK VISTA

## (undated) DEVICE — SOLUTION IV IRRIG POUR BRL 0.9% SODIUM CHL 2F7124

## (undated) DEVICE — GLIDESHEATH SLENDER ACCESS KIT: Brand: GLIDESHEATH SLENDER

## (undated) DEVICE — THIS ADAPTER IS A DOUBLE SEALING FEMALE LUER LOCK ADAPTER WITH A 2-PIECE, COMBINATION COMPRESSION FIT/BARBED CATHETER CONNECTOR. THE ADAPTER IS USED TO CONNECT THE PD CATHETER TO A SOLUTION TRANSFER SET WITH LOCKING CONNECTOR.: Brand: LOCKING TITANIUM ADAPTER FOR PERITONEAL DIALYSIS CATHETER

## (undated) DEVICE — STAPLER EXT SKIN 35 WIDE S STL STPL SQUEEZE HNDL VISISTAT

## (undated) DEVICE — CATHETER PERI DLYS L62CM 2 CUF CURLED ARGY

## (undated) DEVICE — CANNULA,OXY,ADULT,SUPERSOFT,W/7'TUB,SC: Brand: MEDLINE INDUSTRIES, INC.

## (undated) DEVICE — Z DISCONTINUED USE 2275683 GLOVE SURG SZ 6.5 L12IN FNGR THK13MIL WHT ISOLEX

## (undated) DEVICE — GAUZE,SPONGE,2"X2",8PLY,STERILE,LF,2'S: Brand: MEDLINE

## (undated) DEVICE — CATHETER PULM ART 5FR INFL 0.75CC L110CM BAL DIA8MM SGL WDG

## (undated) DEVICE — WET SKIN SCRUB PREP TRAY: Brand: KENDALL

## (undated) DEVICE — SUTURE VICRYL + SZ 2-0 L18IN ABSRB UD CT1 L36MM 1/2 CIR VCP839D

## (undated) DEVICE — SUTURE VICRYL + SZ 2-0 L27IN ABSRB CLR CT-1 1/2 CIR TAPERCUT VCP259H

## (undated) DEVICE — HI-TORQUE WIGGLE GUIDE WIRE .014 STRAIGHT TIP 2.0 CM X 190 CM: Brand: HI-TORQUE WIGGLE

## (undated) DEVICE — GAUZE SPONGES,12 PLY: Brand: DERMACEA

## (undated) DEVICE — ELECTRODE ES AD DISPER HYDRGEL THN FOAM ADH SCALLOPED EDGE

## (undated) DEVICE — DRESSING STERILE PETRO W3XL8IN N ADH OIL EMUL GZ CURAD

## (undated) DEVICE — SKIN MARKER,REGULAR TIP WITH RULER AND LABELS: Brand: DEVON

## (undated) DEVICE — PACLITAXEL-COATED BALLOON CATHETER: Brand: AGENT™

## (undated) DEVICE — DRESSING GRMCDL 6 12FR D1N CNTR HOLE 4MM ANTMCRBL PRTCTVE DI

## (undated) DEVICE — ZIMMER® STERILE DISPOSABLE TOURNIQUET CUFF WITH PLC, DUAL PORT, SINGLE BLADDER, 24 IN. (61 CM)

## (undated) DEVICE — INTENDED TO BE USED TO OCCLUDE, RETRACT AND IDENTIFY ARTERIES, VEINS, TENDONS AND NERVES IN SURGICAL PROCEDURES: Brand: STERION®  VESSEL LOOP

## (undated) DEVICE — CONMED SCOPE SAVER BITE BLOCK, 20X27 MM: Brand: SCOPE SAVER

## (undated) DEVICE — RESERVOIR,SUCTION,100CC,SILICONE: Brand: MEDLINE

## (undated) DEVICE — Z CONVERTED USE 2275871 SPONGE GZ W4XL4IN WHT 8 PLY CURITY

## (undated) DEVICE — Z DISCONTINUED GLOVE SURG SZ 7.5 L12IN FNGR THK13MIL WHT ISOLEX

## (undated) DEVICE — PAD THRM M SPL TORSO

## (undated) DEVICE — BLADE,STAINLESS-STEEL,15,STRL,DISPOSABLE: Brand: MEDLINE

## (undated) DEVICE — CONTAINER,SPECIMEN,OR STERILE,4OZ: Brand: MEDLINE

## (undated) DEVICE — ZIMMER® STERILE DISPOSABLE TOURNIQUET CUFF WITH PLC, DUAL PORT, SINGLE BLADDER, 18 IN. (46 CM)

## (undated) DEVICE — GUIDEWIRE VASC L260CM DIA0.035IN RAD 3MM J TIP L7CM PTFE

## (undated) DEVICE — INTRODUCER SHTH 8FR L23CM DIL TIP L35MM BLU SIL COEXTRUDED